# Patient Record
Sex: MALE | Race: BLACK OR AFRICAN AMERICAN
[De-identification: names, ages, dates, MRNs, and addresses within clinical notes are randomized per-mention and may not be internally consistent; named-entity substitution may affect disease eponyms.]

---

## 2018-03-22 ENCOUNTER — HOSPITAL ENCOUNTER (INPATIENT)
Dept: HOSPITAL 97 - ER | Age: 33
LOS: 4 days | Discharge: NURSING, REHAB OR LONG-TERM CARE | DRG: 539 | End: 2018-03-26
Attending: FAMILY MEDICINE | Admitting: INTERNAL MEDICINE
Payer: COMMERCIAL

## 2018-03-22 VITALS — BODY MASS INDEX: 56.5 KG/M2

## 2018-03-22 DIAGNOSIS — G82.20: ICD-10-CM

## 2018-03-22 DIAGNOSIS — M86.172: Primary | ICD-10-CM

## 2018-03-22 DIAGNOSIS — L89.893: ICD-10-CM

## 2018-03-22 DIAGNOSIS — F17.200: ICD-10-CM

## 2018-03-22 DIAGNOSIS — G89.4: ICD-10-CM

## 2018-03-22 DIAGNOSIS — Q05.2: ICD-10-CM

## 2018-03-22 DIAGNOSIS — K21.9: ICD-10-CM

## 2018-03-22 DIAGNOSIS — L89.624: ICD-10-CM

## 2018-03-22 DIAGNOSIS — R32: ICD-10-CM

## 2018-03-22 DIAGNOSIS — L89.513: ICD-10-CM

## 2018-03-22 DIAGNOSIS — I89.0: ICD-10-CM

## 2018-03-22 DIAGNOSIS — L89.523: ICD-10-CM

## 2018-03-22 DIAGNOSIS — I10: ICD-10-CM

## 2018-03-22 DIAGNOSIS — L03.115: ICD-10-CM

## 2018-03-22 LAB
ALBUMIN SERPL BCP-MCNC: 3.6 G/DL (ref 3.2–5.5)
ALP SERPL-CCNC: 192 IU/L (ref 42–121)
ALT SERPL W P-5'-P-CCNC: 135 IU/L (ref 10–60)
AST SERPL W P-5'-P-CCNC: 88 IU/L (ref 10–42)
BUN BLD-MCNC: 10 MG/DL (ref 6–20)
GLUCOSE SERPLBLD-MCNC: 84 MG/DL (ref 65–120)
HCT VFR BLD CALC: 45.4 % (ref 39.6–49)
INR BLD: 1.1
LIPASE SERPL-CCNC: 22 U/L (ref 22–51)
LYMPHOCYTES # SPEC AUTO: 1 K/UL (ref 0.7–4.9)
MCH RBC QN AUTO: 27.6 PG (ref 27–35)
MCV RBC: 82.4 FL (ref 80–100)
PMV BLD: 8.3 FL (ref 7.6–11.3)
POTASSIUM SERPL-SCNC: 3.9 MEQ/L (ref 3.6–5)
RBC # BLD: 5.51 M/UL (ref 4.33–5.43)

## 2018-03-22 PROCEDURE — 80076 HEPATIC FUNCTION PANEL: CPT

## 2018-03-22 PROCEDURE — 83690 ASSAY OF LIPASE: CPT

## 2018-03-22 PROCEDURE — 83735 ASSAY OF MAGNESIUM: CPT

## 2018-03-22 PROCEDURE — 87186 SC STD MICRODIL/AGAR DIL: CPT

## 2018-03-22 PROCEDURE — 85025 COMPLETE CBC W/AUTO DIFF WBC: CPT

## 2018-03-22 PROCEDURE — 80048 BASIC METABOLIC PNL TOTAL CA: CPT

## 2018-03-22 PROCEDURE — 87040 BLOOD CULTURE FOR BACTERIA: CPT

## 2018-03-22 PROCEDURE — 99285 EMERGENCY DEPT VISIT HI MDM: CPT

## 2018-03-22 PROCEDURE — 85610 PROTHROMBIN TIME: CPT

## 2018-03-22 PROCEDURE — 81015 MICROSCOPIC EXAM OF URINE: CPT

## 2018-03-22 PROCEDURE — 87205 SMEAR GRAM STAIN: CPT

## 2018-03-22 PROCEDURE — 83880 ASSAY OF NATRIURETIC PEPTIDE: CPT

## 2018-03-22 PROCEDURE — 84145 PROCALCITONIN (PCT): CPT

## 2018-03-22 PROCEDURE — 81003 URINALYSIS AUTO W/O SCOPE: CPT

## 2018-03-22 PROCEDURE — 87070 CULTURE OTHR SPECIMN AEROBIC: CPT

## 2018-03-22 PROCEDURE — 93005 ELECTROCARDIOGRAM TRACING: CPT

## 2018-03-22 PROCEDURE — 36415 COLL VENOUS BLD VENIPUNCTURE: CPT

## 2018-03-22 PROCEDURE — 84484 ASSAY OF TROPONIN QUANT: CPT

## 2018-03-22 PROCEDURE — 83605 ASSAY OF LACTIC ACID: CPT

## 2018-03-22 PROCEDURE — 87077 CULTURE AEROBIC IDENTIFY: CPT

## 2018-03-22 PROCEDURE — 71045 X-RAY EXAM CHEST 1 VIEW: CPT

## 2018-03-22 NOTE — XMS REPORT
Patient Summary Document

 Created on:2018



Patient:KAITLIN VERDIN

Sex:Male

:1985

External Reference #:[id number]





Demographics







 Address  [address]



   [place] [address]

 

 Home Phone  [phone]

 

 Work Phone  [phone]

 

 Email Address  [phone]

 

 Preferred Language  Unknown

 

 Marital Status  Unknown

 

 Oriental orthodox Affiliation  Unknown

 

 Race  Unknown

 

 Additional Race(s)  Unavailable

 

 Ethnic Group  Unknown









Author







 Organization  [facility]

 

 Address  [address] [place] [address] [address]



   [place] [address]

 

 Phone  [phone]









Care Team Providers







 Name  Role  Phone

 

 RALPH TORRES  Unavailable  Unavailable









Problems

This patient has no known problems.



Allergies, Adverse Reactions, Alerts

This patient has no known allergies or adverse reactions.



Medications

This patient has no known medications.



Results







 Test Description  Test Time  Test Comments  Text Results  Atomic Results  
Result Comments









 BLOOD CULTURE  2017 16:22:00    









   Test Item  Value  Reference Range  Comments









 CULTURE (BEAKER) (test code=1095)  No growth in 5 days    



BLOOD [id number] 16:22:00





 Test Item  Value  Reference Range  Comments

 

 CULTURE (BEAKER) (test code=1095)  No growth in 5 days    



(MANUAL DIFFERENTIAL)2017 22:29:00





 Test Item  Value  Reference Range  Comments

 

 TOTAL COUNTED (BEAKER) (test code=1351)      

 

 WBC MORPHOLOGY (BEAKER) (test code=487)  Normal    

 

 PLT MORPHOLOGY (BEAKER) (test code=486)  Normal    

 

 RBC MORPHOLOGY (BEAKER) (test code=762)  Normal    



CBC W/PLT COUNT &amp; AUTO [id number] 22:28:00





 Test Item  Value  Reference Range  Comments

 

 WHITE BLOOD CELL COUNT (BEAKER) (test code=775)  7.3 K/ L  4.0-10.0  

 

 RED BLOOD CELL COUNT (BEAKER) (test code=761)  5.09 M/ L  4.20-5.80  

 

 HEMOGLOBIN (BEAKER) (test code=410)  13.0 GM/DL  13.0-16.8  

 

 HEMATOCRIT (BEAKER) (test code=411)  42.3 %  40.0-50.0  

 

 MEAN CORPUSCULAR VOLUME (BEAKER) (test code=753)  83.0 fL  82.0-98.0  

 

 MEAN CORPUSCULAR HEMOGLOBIN (BEAKER) (test  25.6 pg  27.0-33.0  



 code=751)      

 

 MEAN CORPUSCULAR HEMOGLOBIN CONC (BEAKER) (test  30.8 GM/DL  32.0-36.0  



 code=752)      

 

 RED CELL DISTRIBUTION WIDTH (BEAKER) (test  18.0 %  10.3-14.2  



 code=412)      

 

 PLATELET COUNT (BEAKER) (test code=756)  331 K/CU MM  150-430  

 

 MEAN PLATELET VOLUME (BEAKER) (test code=754)  8.5 fL  6.5-10.5  

 

 NUCLEATED RED BLOOD CELLS (BEAKER) (test  0 /100 WBC  0-0  



 code=413)      

 

 NEUTROPHILS RELATIVE PERCENT (BEAKER) (test  65 %    



 code=429)      

 

 LYMPHOCYTES RELATIVE PERCENT (BEAKER) (test  23 %    



 code=430)      

 

 MONOCYTES RELATIVE PERCENT (BEAKER) (test  8 %    



 code=431)      

 

 EOSINOPHILS RELATIVE PERCENT (BEAKER) (test  3 %    



 code=432)      

 

 BASOPHILS RELATIVE PERCENT (BEAKER) (test  1 %    



 code=437)      

 

 NEUTROPHILS ABSOLUTE COUNT (BEAKER) (test  4.75 K/ L  1.80-8.00  



 code=670)      

 

 LYMPHOCYTES ABSOLUTE COUNT (BEAKER) (test  1.68 K/ L  1.48-4.50  



 code=414)      

 

 MONOCYTES ABSOLUTE COUNT (BEAKER) (test  0.55 K/ L  0.00-1.30  



 code=415)      

 

 EOSINOPHILS ABSOLUTE COUNT (BEAKER) (test  0.24 K/ L  0.00-0.50  



 code=416)      

 

 BASOPHILS ABSOLUTE COUNT (BEAKER) (test  0.05 K/ L  0.00-0.20  



 code=417)      



[id number] METABOLIC [id number] 11:11:00





 Test Item  Value  Reference Range  Comments

 

 SODIUM (BEAKER) (test  138 meq/L  136-145  



 tufa=109)      

 

 POTASSIUM (BEAKER) (test  4.0 meq/L  3.5-5.1  



 code=379)      

 

 CHLORIDE (BEAKER) (test  108 meq/L    



 code=382)      

 

 CO2 (BEAKER) (test  23 meq/L  22-29  



 code=355)      

 

 BLOOD UREA NITROGEN  11 mg/dL  7-21  



 (BEAKER) (test code=354)      

 

 CREATININE (BEAKER) (test  0.74 mg/dL  0.57-1.25  



 code=358)      

 

 GLUCOSE RANDOM (BEAKER)  124 mg/dL    



 (test code=652)      

 

 CALCIUM (BEAKER) (test  8.9 mg/dL  8.4-10.2  



 code=697)      

 

 EGFR (BEAKER) (test  150 mL/min/1.73 sq m    ESTIMATED GFR IS NOT AS



 code=1092)      ACCURATE AS CREATININE



       CLEARANCE IN PREDICTING



       GLOMERULAR FILTRATION



       RATE. ESTIMATED GFR IS



       NOT APPLICABLE FOR



       DIALYSIS PATIENTS.



URINE [id number] 09:56:00





 Test Item  Value  Reference Range  Comments

 

 CULTURE (BEAKER) (test  &lt;10,000 col/mL skin jaime    



 code=1095)      



COMPREHENSIVE METABOLIC [id number] 08:49:00





 Test Item  Value  Reference Range  Comments

 

 TOTAL PROTEIN (BEAKER)  7.3 gm/dL  6.0-8.3  



 (test code=770)      

 

 ALBUMIN (BEAKER) (test  3.3 g/dL  3.5-5.0  



 code=1145)      

 

 ALKALINE PHOSPHATASE  89 U/L    



 (BEAKER) (test code=346)      

 

 BILIRUBIN TOTAL (BEAKER)  1.4 mg/dL  0.2-1.2  



 (test code=377)      

 

 SODIUM (BEAKER) (test  137 meq/L  136-145  



 kdfd=949)      

 

 POTASSIUM (BEAKER) (test  3.7 meq/L  3.5-5.1  



 code=379)      

 

 CHLORIDE (BEAKER) (test  108 meq/L    



 code=382)      

 

 CO2 (BEAKER) (test  17 meq/L  22-29  



 code=355)      

 

 BLOOD UREA NITROGEN  12 mg/dL  7-21  



 (BEAKER) (test code=354)      

 

 CREATININE (BEAKER) (test  0.78 mg/dL  0.57-1.25  



 code=358)      

 

 GLUCOSE RANDOM (BEAKER)  119 mg/dL    



 (test code=652)      

 

 CALCIUM (BEAKER) (test  8.6 mg/dL  8.4-10.2  



 code=697)      

 

 AST (SGOT) (BEAKER) (test  13 U/L  5-34  



 code=353)      

 

 ALT (SGPT) (BEAKER) (test  28 U/L  6-55  



 code=347)      

 

 EGFR (BEAKER) (test  141 mL/min/1.73 sq    ESTIMATED GFR IS NOT AS



 code=1092)  m    ACCURATE AS CREATININE



       CLEARANCE IN PREDICTING



       GLOMERULAR FILTRATION



       RATE. ESTIMATED GFR IS



       NOT APPLICABLE FOR



       DIALYSIS PATIENTS.



CBC W/PLT COUNT &amp; AUTO [id number] 08:46:00





 Test Item  Value  Reference Range  Comments

 

 WHITE BLOOD CELL COUNT (BEAKER) (test code=775)  9.4 K/ L  4.0-10.0  

 

 RED BLOOD CELL COUNT (BEAKER) (test code=761)  4.79 M/ L  4.20-5.80  

 

 HEMOGLOBIN (BEAKER) (test code=410)  12.8 GM/DL  13.0-16.8  

 

 HEMATOCRIT (BEAKER) (test code=411)  40.0 %  40.0-50.0  

 

 MEAN CORPUSCULAR VOLUME (BEAKER) (test code=753)  83.4 fL  82.0-98.0  

 

 MEAN CORPUSCULAR HEMOGLOBIN (BEAKER) (test  26.7 pg  27.0-33.0  



 code=751)      

 

 MEAN CORPUSCULAR HEMOGLOBIN CONC (BEAKER) (test  32.0 GM/DL  32.0-36.0  



 code=752)      

 

 RED CELL DISTRIBUTION WIDTH (BEAKER) (test  18.2 %  10.3-14.2  



 code=412)      

 

 PLATELET COUNT (BEAKER) (test code=756)  313 K/CU MM  150-430  

 

 MEAN PLATELET VOLUME (BEAKER) (test code=754)  8.6 fL  6.5-10.5  

 

 NUCLEATED RED BLOOD CELLS (BEAKER) (test  0 /100 WBC  0-0  



 code=413)      

 

 NEUTROPHILS RELATIVE PERCENT (BEAKER) (test  70 %    



 code=429)      

 

 LYMPHOCYTES RELATIVE PERCENT (BEAKER) (test  16 %    



 code=430)      

 

 MONOCYTES RELATIVE PERCENT (BEAKER) (test  12 %    



 code=431)      

 

 EOSINOPHILS RELATIVE PERCENT (BEAKER) (test  1 %    



 code=432)      

 

 BASOPHILS RELATIVE PERCENT (BEAKER) (test  1 %    



 code=437)      

 

 NEUTROPHILS ABSOLUTE COUNT (BEAKER) (test  6.54 K/ L  1.80-8.00  



 code=670)      

 

 LYMPHOCYTES ABSOLUTE COUNT (BEAKER) (test  1.50 K/ L  1.48-4.50  



 code=414)      

 

 MONOCYTES ABSOLUTE COUNT (BEAKER) (test  1.13 K/ L  0.00-1.30  



 code=415)      

 

 EOSINOPHILS ABSOLUTE COUNT (BEAKER) (test  0.13 K/ L  0.00-0.50  



 code=416)      

 

 BASOPHILS ABSOLUTE COUNT (BEAKER) (test  0.06 K/ L  0.00-0.20  



 code=417)      



0.00URINALYSIS W/ [id number] 20:36:00





 Test Item  Value  Reference Range  Comments

 

 COLOR (BEAKER) (test code=470)  Yellow    

 

 CLARITY (BEAKER) (test code=469)  Hazy    

 

 SPECIFIC GRAVITY UA (BEAKER) (test code=468)  1.012  1.001-1.035  

 

 PH UA (BEAKER) (test code=467)  5.5  5.0-8.0  

 

 PROTEIN UA (BEAKER) (test code=464)  100 mg/dL  Negative  

 

 GLUCOSE UA (BEAKER) (test code=365)  Negative  Negative  

 

 KETONES UA (BEAKER) (test code=371)  Negative  Negative  

 

 BILIRUBIN UA (BEAKER) (test code=462)  Negative  Negative  

 

 BLOOD UA (BEAKER) (test code=461)  Moderate  Negative  

 

 NITRITE UA (BEAKER) (test code=465)  Negative  Negative  

 

 LEUKOCYTE ESTERASE UA (BEAKER) (test code=466)  Large  Negative  

 

 UROBILINOGEN UA (BEAKER) (test code=463)  3.0 mg/dL  0.2-1.0  

 

 RBC UA (BEAKER) (test code=519)  19 /HPF    

 

 WBC UA (BEAKER) (test code=520)  182 /HPF    

 

 SOURCE(BEAKER) (test code=0942)      



BASIC METABOLIC [id number] 17:00:00





 Test Item  Value  Reference Range  Comments

 

 SODIUM (BEAKER) (test  140 meq/L  136-145  



 iwbv=792)      

 

 POTASSIUM (BEAKER) (test  3.7 meq/L  3.5-5.1  



 code=379)      

 

 CHLORIDE (BEAKER) (test  112 meq/L    



 code=382)      

 

 CO2 (BEAKER) (test  17 meq/L  22-29  



 code=355)      

 

 BLOOD UREA NITROGEN  23 mg/dL  7-21  



 (BEAKER) (test code=354)      

 

 CREATININE (BEAKER) (test  1.00 mg/dL  0.57-1.25  



 code=358)      

 

 GLUCOSE RANDOM (BEAKER)  102 mg/dL    



 (test code=652)      

 

 CALCIUM (BEAKER) (test  8.7 mg/dL  8.4-10.2  



 code=697)      

 

 EGFR (BEAKER) (test  106 mL/min/1.73 sq m    ESTIMATED GFR IS NOT AS



 code=1092)      ACCURATE AS CREATININE



       CLEARANCE IN PREDICTING



       GLOMERULAR FILTRATION



       RATE. ESTIMATED GFR IS



       NOT APPLICABLE FOR



       DIALYSIS PATIENTS.



Specimen slightly ictericCBC W/PLT COUNT &amp; AUTO [id number] 12:18
:00





 Test Item  Value  Reference Range  Comments

 

 WHITE BLOOD CELL COUNT (BEAKER) (test code=775)  16.4 K/ L  4.0-10.0  

 

 RED BLOOD CELL COUNT (BEAKER) (test code=761)  5.22 M/ L  4.20-5.80  

 

 HEMOGLOBIN (BEAKER) (test code=410)  14.4 GM/DL  13.0-16.8  

 

 HEMATOCRIT (BEAKER) (test code=411)  42.9 %  40.0-50.0  

 

 MEAN CORPUSCULAR VOLUME (BEAKER) (test code=753)  82.2 fL  82.0-98.0  

 

 MEAN CORPUSCULAR HEMOGLOBIN (BEAKER) (test  27.5 pg  27.0-33.0  



 code=751)      

 

 MEAN CORPUSCULAR HEMOGLOBIN CONC (BEAKER) (test  33.5 GM/DL  32.0-36.0  



 code=752)      

 

 RED CELL DISTRIBUTION WIDTH (BEAKER) (test  16.2 %  10.3-14.2  



 code=412)      

 

 PLATELET COUNT (BEAKER) (test code=756)  329 K/CU MM  150-430  

 

 MEAN PLATELET VOLUME (BEAKER) (test code=754)  8.2 fL  6.5-10.5  

 

 NUCLEATED RED BLOOD CELLS (BEAKER) (test  0 /100 WBC  0-0  



 code=413)      

 

 NEUTROPHILS RELATIVE PERCENT (BEAKER) (test  83 %    



 code=429)      

 

 LYMPHOCYTES RELATIVE PERCENT (BEAKER) (test  7 %    



 code=430)      

 

 MONOCYTES RELATIVE PERCENT (BEAKER) (test  9 %    



 code=431)      

 

 EOSINOPHILS RELATIVE PERCENT (BEAKER) (test  0 %    



 code=432)      

 

 BASOPHILS RELATIVE PERCENT (BEAKER) (test  0 %    



 code=437)      

 

 NEUTROPHILS ABSOLUTE COUNT (BEAKER) (test  1.62 K/ L  1.80-8.00  



 code=670)      

 

 LYMPHOCYTES ABSOLUTE COUNT (BEAKER) (test  1.15 K/ L  1.48-4.50  



 code=414)      

 

 MONOCYTES ABSOLUTE COUNT (BEAKER) (test  1.56 K/ L  0.00-1.30  



 code=415)      

 

 EOSINOPHILS ABSOLUTE COUNT (BEAKER) (test  0.01 K/ L  0.00-0.50  



 code=416)      

 

 BASOPHILS ABSOLUTE COUNT (BEAKER) (test  0.02 K/ L  0.00-0.20  



 code=417)      



(MANUAL DIFFERENTIAL)2017 12:18:00





 Test Item  Value  Reference Range  Comments

 

 TOTAL COUNTED (BEAKER) (test code=1351)      

 

 WBC MORPHOLOGY (BEAKER) (test code=487)  Normal    

 

 PLT MORPHOLOGY (BEAKER) (test code=486)  Normal    

 

 RBC MORPHOLOGY (BEAKER) (test code=762)  Normal

## 2018-03-23 LAB
BUN BLD-MCNC: 9 MG/DL (ref 6–20)
GLUCOSE SERPLBLD-MCNC: 71 MG/DL (ref 65–120)
HCT VFR BLD CALC: 42.3 % (ref 39.6–49)
LYMPHOCYTES # SPEC AUTO: 1 K/UL (ref 0.7–4.9)
MAGNESIUM SERPL-MCNC: 1.9 MG/DL (ref 1.8–2.5)
MCH RBC QN AUTO: 27.7 PG (ref 27–35)
MCV RBC: 82.5 FL (ref 80–100)
PMV BLD: 8.6 FL (ref 7.6–11.3)
POTASSIUM SERPL-SCNC: 3.9 MEQ/L (ref 3.6–5)
RBC # BLD: 5.12 M/UL (ref 4.33–5.43)
UA COMPLETE W REFLEX CULTURE PNL UR: (no result)
UA DIPSTICK W REFLEX MICRO PNL UR: (no result)

## 2018-03-23 RX ADMIN — LINEZOLID SCH MLS: 600 INJECTION, SOLUTION INTRAVENOUS at 21:01

## 2018-03-23 RX ADMIN — FENTANYL CITRATE PRN MCG: 50 INJECTION, SOLUTION INTRAMUSCULAR; INTRAVENOUS at 20:59

## 2018-03-23 RX ADMIN — PROMETHAZINE HYDROCHLORIDE PRN MG: 25 INJECTION INTRAMUSCULAR; INTRAVENOUS at 17:02

## 2018-03-23 RX ADMIN — Medication SCH ML: at 21:00

## 2018-03-23 RX ADMIN — Medication SCH ML: at 09:00

## 2018-03-23 RX ADMIN — FENTANYL CITRATE PRN MCG: 50 INJECTION, SOLUTION INTRAMUSCULAR; INTRAVENOUS at 17:02

## 2018-03-23 RX ADMIN — PIPERACILLIN SODIUM AND TAZOBACTAM SODIUM SCH MLS: 3; .375 INJECTION, POWDER, LYOPHILIZED, FOR SOLUTION INTRAVENOUS at 10:53

## 2018-03-23 RX ADMIN — FENTANYL CITRATE PRN MCG: 50 INJECTION, SOLUTION INTRAMUSCULAR; INTRAVENOUS at 10:53

## 2018-03-23 RX ADMIN — SODIUM CHLORIDE SCH MLS: 0.9 INJECTION, SOLUTION INTRAVENOUS at 03:42

## 2018-03-23 RX ADMIN — PROMETHAZINE HYDROCHLORIDE PRN MG: 25 INJECTION INTRAMUSCULAR; INTRAVENOUS at 10:53

## 2018-03-23 RX ADMIN — LINEZOLID SCH MLS: 600 INJECTION, SOLUTION INTRAVENOUS at 15:30

## 2018-03-23 RX ADMIN — METOPROLOL TARTRATE SCH MG: 25 TABLET ORAL at 17:01

## 2018-03-23 RX ADMIN — LINEZOLID SCH MLS: 600 INJECTION, SOLUTION INTRAVENOUS at 10:53

## 2018-03-23 RX ADMIN — ENOXAPARIN SODIUM SCH MG: 40 INJECTION SUBCUTANEOUS at 10:53

## 2018-03-23 RX ADMIN — PIPERACILLIN SODIUM AND TAZOBACTAM SODIUM SCH MLS: 3; .375 INJECTION, POWDER, LYOPHILIZED, FOR SOLUTION INTRAVENOUS at 17:01

## 2018-03-23 RX ADMIN — PIPERACILLIN SODIUM AND TAZOBACTAM SODIUM SCH MLS: 3; .375 INJECTION, POWDER, LYOPHILIZED, FOR SOLUTION INTRAVENOUS at 03:44

## 2018-03-23 RX ADMIN — PROMETHAZINE HYDROCHLORIDE PRN MG: 25 INJECTION INTRAMUSCULAR; INTRAVENOUS at 05:51

## 2018-03-23 RX ADMIN — Medication SCH PKT: at 21:01

## 2018-03-23 RX ADMIN — OXYCODONE AND ACETAMINOPHEN PRN TAB: 5; 325 TABLET ORAL at 04:44

## 2018-03-23 RX ADMIN — SODIUM CHLORIDE SCH: 0.9 INJECTION, SOLUTION INTRAVENOUS at 12:15

## 2018-03-23 NOTE — ER
Nurse's Notes                                                                                     

 [facility]                                                                

Name: Redd Dale Jr                                                                            

Age: 32 yrs                                                                                       

Sex: Male                                                                                         

: 1985                                                                                   

MRN: [id number]                                                                                   

Arrival Date: 2018                                                                          

Time: 20:25                                                                                       

Account#: [id number]                                                                            

Bed 8                                                                                             

Private MD:                                                                                       

Diagnosis: Gangrene, not elsewhere classified                                                     

                                                                                                  

Presentation:                                                                                     

                                                                                             

20:27 Presenting complaint: Patient states: fever X1. pt c/o vomiting and diarrhea X2 days.   ak1 

      pt last dose Percocet at 1400 today. Transition of care: patient was not received from      

      another setting of care. Onset of symptoms was 2018. Care prior to arrival:       

      None.                                                                                       

20:27 Method Of Arrival: EMS: [facility]                                                    ak1 

20:27 Acuity: AYESHA 3                                                                           ak1 

                                                                                                  

Triage Assessment:                                                                                

20:32 General: Appears in no apparent distress. Behavior is calm, cooperative. Pain:          ak1 

      Complains of pain in back and left heel. EENT: No signs and/or symptoms were reported       

      regarding the EENT system. Neuro: No deficits noted. Cardiovascular: Chest pain.            

      Respiratory: No deficits noted. GI: Reports diarrhea, nausea, vomiting. : No signs        

      and/or symptoms were reported regarding the genitourinary system. Derm: Reports fever X     

      1 day. Musculoskeletal: No signs and/or symptoms reported regarding the musculoskeletal     

      system.                                                                                     

                                                                                                  

Historical:                                                                                       

- Allergies:                                                                                      

20:32 Amoxicillin;                                                                            ak1 

20:32 Bactrim;                                                                                ak1 

20:32 Ciprofloxacin;                                                                          ak1 

20:32 CLAVULANIC ACID;                                                                        ak1 

20:32 Doxycycline;                                                                            ak1 

20:32 Levofloxacin;                                                                           ak1 

20:32 Morphine;                                                                               ak1 

20:32 sulfamethoxazole;                                                                       ak1 

20:32 Toradol;                                                                                ak1 

20:32 TRIMETHOPRIM;                                                                           ak1 

20:32 Vancomycin;                                                                             ak1 

20:32 Zofran;                                                                                 ak1 

- Home Meds:                                                                                      

20:32 Percocet  mg Oral tab 1 tab twice a day [Active];                                 ak1 

- PMHx:                                                                                           

20:32 Asthma; decubitus ulcers on feet; Cerebral Palsy; Hydrocephalus; spina bifida;          ak1 

- PSHx:                                                                                           

20:32 Cholecystectomy; Hydrocephalus Shunt; back sx; hip sx; left heel sx;                    ak1 

                                                                                                  

- Immunization history:: Adult Immunizations unknown.                                             

- Social history:: Smoking status: unknown.                                                       

                                                                                                  

                                                                                                  

Screenin:34 Abuse screen: Denies threats or abuse. Denies injuries from another. Nutritional        ak1 

      screening: No deficits noted. Tuberculosis screening: No symptoms or risk factors           

      identified. Fall Risk Ambulatory Aid- None/Bed Rest/Nurse Assist (0 pts).                   

                                                                                                  

Assessment:                                                                                       

20:35 Reassessment: Patient appears in no apparent distress at this time. No changes from     ak1 

      previously documented assessment. pt with wounds to bilateral feet.                         

23:57 Reassessment: Patient appears in no apparent distress at this time. No changes from     ak1 

      previously documented assessment. pt continues to c/o pain to bilateral lower legs. pt      

      stated he needs his left foot dressing changed due to his home health nurse canceling       

      their appointment today. ERP notified of pt requesting IV pain medication due to            

      vomiting at home. pt has had no vomiting noted while here in the ER. pt does not have       

      his personally wheelchair at this time.                                                     

                                                                                             

00:50 Reassessment: gangernous wound to bottom of left foot, ERP visualized. wound to be      ak1 

      redressed per verbal orders from ERP. .                                                     

                                                                                                  

Vital Signs:                                                                                      

                                                                                             

20:27  / 108; Pulse 110; Resp 18; Temp 99.3(O); Pulse Ox 99% on R/A; Weight 127.01 kg   ak1 

      (R); Height 4 ft. 11 in. (149.86 cm) (R); Pain 9/10;                                        

23:59  / 91; Pulse 111; Resp 20; Temp 100.0(O); Pulse Ox 100% on R/A; Pain 9/10;        ak1 

20:27 Body Mass Index 56.55 (127.01 kg, 149.86 cm)                                            ak1 

                                                                                                  

ED Course:                                                                                        

20:25 Patient arrived in ED.                                                                  ds1 

20:26 Radha Brambila, RN is Primary Nurse.                                                     ak1 

20:29 Andrey Harper MD is Attending Physician.                                              gs  

20:30 Triage completed.                                                                       ak1 

20:32 Arm band placed on Patient placed in an exam room, on a stretcher, on pulse oximetry.   ak1 

20:34 Patient has correct armband on for positive identification. Bed in low position. Call   ak1 

      light in reach. Side rails up X2. Pulse ox on. NIBP on.                                     

20:45 X-ray completed. Portable x-ray completed in exam room. Patient tolerated procedure     kc2 

      well.                                                                                       

21:49 Inserted saline lock: 18 gauge in right antecubital area, using aseptic technique.      bb  

      Blood collected.                                                                            

                                                                                             

01:13 Dressings: Jack x 1 left foot non-adherent dressing x 2 left foot Ace bandage X2 to    ak1 

      left foot.                                                                                  

01:39 Joan Rice MD is Hospitalizing Provider.                                            

03:04 No provider procedures requiring assistance completed. Patient admitted, IV remains in  ak1 

      place.                                                                                      

                                                                                                  

Administered Medications:                                                                         

                                                                                             

22:00 Drug: NS 0.9% 1000 ml Route: IV; Rate: 1 bolus; Site: right antecubital;                ak1 

                                                                                             

00:50 Drug: Percocet (5 mg-325 mg) 2 tabs Route: PO;                                          ak1 

01:45 Follow up: Response: No adverse reaction                                                ak1 

                                                                                                  

                                                                                                  

Output:                                                                                           

                                                                                             

21:15 Urine: 250ml (Ortega); Total: 250ml.                                                     ak1 

                                                                                                  

Outcome:                                                                                          

                                                                                             

01:39 Decision to Hospitalize by Provider.                                                      

03:04 Admitted to Med/surg accompanied by nurse, accompanied by tech, via stretcher, with     ak1 

      chart, Report called to  Mari COTTO                                                         

03:04 Condition: stable                                                                           

03:04 Instructed on the need for admit.                                                           

03:07 Patient left the ED.                                                                    ak1 

                                                                                                  

Signatures:                                                                                       

Lilly Sandhu Brenda, RN                     RN   Radha Chao RN                       RN   ak1                                                  

Jacqueline Green Gregory, MD MD                                                      

                                                                                                  

**************************************************************************************************

## 2018-03-23 NOTE — P.PN
Subjective


Date of Service: 03/23/18


Primary Care Provider: Dr. Knox; Surgery-Dr. Dorado


Chief Complaint: worsening left chronic osteomyelitis/wound


Subjective: Other (Patient reporting pain to the left foot.  Overall stable.)





Physical Examination





- Vital Signs


Temperature: 100.0 F


Blood Pressure: 163/91


Pulse: 111


Respirations: 20





- Physical Exam


General: Alert, In no apparent distress, Cooperative


HEENT: Atraumatic


Neck: Supple


Respiratory: Clear to auscultation bilaterally, Normal air movement


Cardiovascular: Normal pulses, Regular rate/rhythm


Gastrointestinal: Normal bowel sounds, Soft and benign, Non-distended, No 

tenderness, No masses, No rebound, No guarding


Integumentary: Other (Ulcer to the left foot/heel.  Some exudate noted.)


Neurological: Normal speech, Other (Patient with history of spina bifida.  Not 

able to move his lower extremity.)


Urinary: Suprapubic catheter





- Studies


Laboratory Data (last 24 hrs)





03/22/18 21:49: PT 13.0 H, INR 1.10


03/22/18 21:49: WBC 8.5, Hgb 15.2, Hct 45.4, Plt Count 353


03/22/18 21:49: B-Natriuretic Peptide 20


03/22/18 21:49: Sodium 141, Potassium 3.9, BUN 10, Creatinine 0.80, Glucose 84, 

Total Bilirubin 1.9 H, AST 88 H,  H, Alkaline Phosphatase 192 H, Lipase 

22





Medications List Reviewed: Yes





Assessment & Plan





- Problems (Diagnosis)


(1) Poor social situation


Current Visit: Yes   Status: Chronic   


Plan: 


Patient with poor social situation that is affecting his care for his multiple 

ulcers.  Patient will likely need skilled placement for aggressive wound care 

and IV antibiotic therapy.  Awaiting evaluation of left foot ulcer.  Patient 

likely has osteomyelitis.  Culture obtained.  Will continue current IV 

antibiotic therapy.  Care discussed with surgery.








(2) Hypertension


Onset Date: 11/03/17   Current Visit: No   Status: Chronic   


Plan: 


Patient with history of hypertension.  He is not taking any medication.  Blood 

pressure elevated.  Will start metoprolol.  Will monitor closely.  May need 

further adjustment.


Qualifiers: 


   Hypertension type: essential hypertension   Qualified Code(s): I10 - 

Essential (primary) hypertension   





(3) Lymphedema


Onset Date: 11/03/17   Current Visit: No   Status: Chronic   


Plan: 


Patient with chronic lymphedema.  Will monitor closely.








(4) Stage III pressure ulcer of left ankle


Current Visit: No   Status: Acute   


Plan: 


Pressure ulcer noted with exudate.  Wound culture obtained.  Suspect 

osteomyelitis.  MRI ordered.  Patient will likely need aggressive wound care 

and IV antibiotic therapy.  If required patient will need PICC line and social 

services will need to help with skilled placement.  Await MRI finding.  Care 

discussed with surgery who agrees with the plan.








(5) Chronic pain disorder


Onset Date: 11/03/17   Current Visit: No   Status: Chronic   


Plan: 


Will provide medication for pain.  Will need to monitor for drug-seeking 

behavior.








(6) GERD (gastroesophageal reflux disease)


Onset Date: 11/03/17   Current Visit: No   Status: Chronic   


Plan: 


Will provide PPI.


Qualifiers: 


   Esophagitis presence: esophagitis presence not specified   Qualified Code(s)

: K21.9 - Gastro-esophageal reflux disease without esophagitis   





(7) Chronic indwelling Ortega catheter


Onset Date: 11/03/17   Current Visit: No   Status: Chronic   


Plan: 


Patient with chronic indwelling Ortega catheter.  Patient with suprapubic 

catheter.  This needs to be replaced every month.  Will need to check his last 

change.








(8) Osteomyelitis


Current Visit: No   Status: Suspected   


Plan: 


Suspect osteomyelitis of the left foot.  Will obtain MRI.  Will continue with 

above plan of care.


Qualifiers: 


   Osteomyelitis type: chronic multifocal   Osteomyelitis location: foot   

Laterality: left   Qualified Code(s): M86.372 - Chronic multifocal osteomyelitis

, left ankle and foot   





(9) Paraplegic spinal paralysis


Onset Date: 07/22/16   Current Visit: No   Status: Chronic   


Plan: 


Patient with history of spina bifida.  Patient has paralysis to the lower 

extremities.  Stable at this time.








(10) Spina bifida


Onset Date: 11/03/17   Current Visit: No   Status: Chronic   


Plan: 


Patient with history of spina bifida.  Patient with shunt in place.


Qualifiers: 


   Spinal region: lumbosacral   Presence of hydrocephalus: unspecified 

hydrocephalus presence   Qualified Code(s): Q05.7 - Lumbar spina bifida without 

hydrocephalus   


Discharge Plan: Other (Skilled placement)


Plan to discharge in: Greater than 2 days


Time Spent Managing Pts Care (In Minutes): 55

## 2018-03-23 NOTE — CON
Reason:  Wound, left heel.



History Of Present Illness:  The patient is a 32-year-old gentleman with chronic wound on his left fo
ot.  He came to the ER complaining of pain, fever, and sweating for 2 days.  He follows up with me in
 the wound healing center and he had a temperature of 100.0.  He has some yellowish discharge from th
e wound and foul odor and he was admitted and I was consulted.  He is awake, alert, still acting a li
ttle weak.  No current fever or chills.



Review of Systems:

Otherwise unremarkable.



Past Medical History:  Spina bifida, history of osteomyelitis, recurrent UTI, hydrocephalus, cranial 
perineal shunt, migraines, paralysis in the lower extremities.



Past Surgical History:  Shunt revision, cholecystectomy, indwelling suprapubic catheter, foot surgery
, hip surgery, appendectomy.



Allergies:  MULTIPLE; LEVAQUIN, MORPHINE, BACTRIM, 

KETOROLAC, ONDANSETRON, AMOXICILLIN, CIPRO, DOXYCYCLINE, VANCOMYCIN, ZOFRAN.



Medications:  Reviewed.



Social History:  He does smoke.  He was counselled and drinks occasionally.



Family History:  Noncontributory.



Physical Examination:

Vital Signs:  Stable.  His temperature the last one is 100.  His heart rate is a little elevated 111.
  He is awake and alert. 

Head and Neck:  No masses. 

Chest:  Clear. 

Heart:  S1, S2. 

Abdomen: Soft. 

Extremity:  Chronic lymphedema with left heel wound approximately 8 x 6 cm with some fibrinous necrot
ic tissue in it, but there is no purulence.  There is no surrounding erythema, warmth, or edema.



Laboratory Data:  White count is normal.  There is no left shift today.  His procalcitonin is normal.
  His lactic acid is normal.



Assessment:  A 32-year-old gentleman with febrile illness, etiology unlikely to be the wound, possibl
e gastroenteritis.



Recommendation:  Hydration.  We will await the results of the MRI on the heel, but this is a chronic 
wound.  I do not think that is the source of his illness at this time.  His main issue is social issu
es at home.  His insurance does not offer for daily home health nurse to come, so he is dependent on 
his mother to change his dressing and she can do it every day and that is why he develops foul odor a
nd has difficulty with the wounds healing.  He is not able to get appropriate care for the wound as n
eeded.  I will discuss this case with Dr. Isabel, and perhaps skill nursing would be better for this 
patient so he can get adequate wound care and help with his daily life issues, which he is having to 
struggling with at this time.  We will continue antibiotics as ordered, and will await the wound cult
ures that have been already ordered and an MRI.  We will make further recommendation after the aforem
entioned workup has been completed.





AS/MAGEN

DD:  03/23/2018 09:41:14Voice ID:  [id number]

DT:  03/23/2018 11:32:22Report ID:  [id number]

## 2018-03-23 NOTE — RAD REPORT
EXAM DESCRIPTION:  RAD - Chest Single View - 3/22/2018 8:47 pm

 

CLINICAL HISTORY:  Fever, vomiting

 

COMPARISON:  February 18

 

TECHNIQUE:  AP portable chest image was obtained 2036 hours .

 

FINDINGS:  Lung volumes are low. Detail is limited due to portable technique, shallow inspiration and
 large body habitus.

 

No peripheral mass or consolidation. Heart size is normal. Vasculature within normal limits. Signific
ant failure or volume overload are not suspected. No measurable pleural effusion and no pneumothorax.
 No gross bony abnormality seen. No acute aortic findings suspected.

 

IMPRESSION:  Exam is limited but no acute cardiopulmonary process suspected.

## 2018-03-23 NOTE — RAD REPORT
EXAM DESCRIPTION:  MRIFoot Left Wo Cont3/23/2018 6:18 pm

 

CLINICAL HISTORY:  Left foot pain and swelling. Open wound posterior left foot

 

COMPARISON:  2014

 

TECHNIQUE:  Axial, sagittal and coronal magnetic resonance imaging of the left foot was obtained.

 

FINDINGS:  An ulceration involves the soft tissues of the posterior hindfoot.

 

Abnormal signal involves the posterior calcaneus compatible with osteomyelitis.

 

Extensive edema is present within the volar soft tissues of the foot

 

IMPRESSION:  Osteomyelitis involving the posterior calcaneus

 

Extensive edema within the volar soft tissues of the forefoot. If the patient has clinical symptoms t
o suggest an abscess in this region then ultrasound would be recommended

## 2018-03-23 NOTE — EKG
Test Date:    2018-03-22               Test Time:    21:09:24

Technician:   RICHA                                    

                                                     

MEASUREMENT RESULTS:                                       

Intervals:                                           

Rate:         104                                    

OR:           154                                    

QRSD:         92                                     

QT:           314                                    

QTc:          412                                    

Axis:                                                

P:            52                                     

OR:           154                                    

QRS:          41                                     

T:            19                                     

                                                     

INTERPRETIVE STATEMENTS:                                       

                                                     

Sinus tachycardia

Otherwise normal ECG



Electronically Signed On 03-23-18 07:56:40 CDT by Yaron Donohue

## 2018-03-23 NOTE — EDPHYS
Physician Documentation                                                                           

 [facility]                                                                

Name: Redd Dale Jr                                                                            

Age: 32 yrs                                                                                       

Sex: Male                                                                                         

: 1985                                                                                   

MRN: [id number]                                                                                   

Arrival Date: 2018                                                                          

Time: 20:25                                                                                       

Account#: [id number]                                                                            

Bed 8                                                                                             

Private MD:                                                                                       

ED Physician Andrey aHrper                                                                       

HPI:                                                                                              

                                                                                             

00:07 This 32 yrs old Black Male presents to ER via EMS with complaints of Fever.             gs  

00:07 The patient reports fever, not measured (subjective). Onset: The symptoms/episode       gs  

      began/occurred today. Modifying factors: there are no obvious modifying factors.            

      Associated signs and symptoms: Pertinent negatives: abdominal pain, chest pain, cough.      

      Severity of symptoms: At their worst the symptoms were moderate in the emergency            

      department the symptoms have improved markedly, no antipyretics since 12 pm 3/22. The       

      patient has experienced similar episodes in the past, a few times.                          

                                                                                                  

Historical:                                                                                       

- Allergies:                                                                                      

                                                                                             

20:32 Amoxicillin;                                                                            ak1 

20:32 Bactrim;                                                                                ak1 

20:32 Ciprofloxacin;                                                                          ak1 

20:32 CLAVULANIC ACID;                                                                        ak1 

20:32 Doxycycline;                                                                            ak1 

20:32 Levofloxacin;                                                                           ak1 

20:32 Morphine;                                                                               ak1 

20:32 sulfamethoxazole;                                                                       ak1 

20:32 Toradol;                                                                                ak1 

20:32 TRIMETHOPRIM;                                                                           ak1 

20:32 Vancomycin;                                                                             ak1 

20:32 Zofran;                                                                                 ak1 

- Home Meds:                                                                                      

20:32 Percocet  mg Oral tab 1 tab twice a day [Active];                                 ak1 

- PMHx:                                                                                           

20:32 Asthma; decubitus ulcers on feet; Cerebral Palsy; Hydrocephalus; spina bifida;          ak1 

- PSHx:                                                                                           

20:32 Cholecystectomy; Hydrocephalus Shunt; back sx; hip sx; left heel sx;                    ak1 

                                                                                                  

- Immunization history:: Adult Immunizations unknown.                                             

- Social history:: Smoking status: unknown.                                                       

                                                                                                  

                                                                                                  

ROS:                                                                                              

                                                                                             

00:07 Cardiovascular: Negative for chest pain.                                                gs  

                                                                                                  

Exam:                                                                                             

01:35 Head/Face:  Normocephalic, atraumatic. Eyes:  Pupils equal round and reactive to light, gs  

      extra-ocular motions intact.  Lids and lashes normal.  Conjunctiva and sclera are           

      non-icteric and not injected.  Cornea within normal limits.  Periorbital areas with no      

      swelling, redness, or edema. ENT:  Nares patent. No nasal discharge, no septal              

      abnormalities noted.  Tympanic membranes are normal and external auditory canals are        

      clear.  Oropharynx with no redness, swelling, or masses, exudates, or evidence of           

      obstruction, uvula midline.  Mucous membranes moist. Neck:  Trachea midline, no             

      thyromegaly or masses palpated, and no cervical lymphadenopathy.  Supple, full range of     

      motion without nuchal rigidity, or vertebral point tenderness.  No Meningismus.             

      Chest/axilla:  Normal chest wall appearance and motion.  Nontender with no deformity.       

      No lesions are appreciated.                                                                 

01:35 Respiratory:  Lungs have equal breath sounds bilaterally, clear to auscultation and         

      percussion.  No rales, rhonchi or wheezes noted.  No increased work of breathing, no        

      retractions or nasal flaring. Abdomen/GI:  Soft, non-tender, with normal bowel sounds.      

      No distension or tympany.  No guarding or rebound.  No evidence of tenderness               

      throughout. Back:  No spinal tenderness.  No costovertebral tenderness.  Full range of      

      motion.                                                                                     

01:35 Constitutional: The patient appears alert, awake.                                           

01:35 Cardiovascular: Rate: tachycardic, Rhythm: regular.                                         

01:35 ECG was reviewed by the Attending Physician.                                                

01:35 Skin: break out stage 4 both feet plantar surface, looks gangrenous, smells putrid,         

      needs debridgement.                                                                         

                                                                                                  

Vital Signs:                                                                                      

                                                                                             

20:27  / 108; Pulse 110; Resp 18; Temp 99.3(O); Pulse Ox 99% on R/A; Weight 127.01 kg   ak1 

      (R); Height 4 ft. 11 in. (149.86 cm) (R); Pain 9/10;                                        

23:59  / 91; Pulse 111; Resp 20; Temp 100.0(O); Pulse Ox 100% on R/A; Pain 9/10;        ak1 

20:27 Body Mass Index 56.55 (127.01 kg, 149.86 cm)                                            ak1 

                                                                                                  

MDM:                                                                                              

20:30 Patient medically screened.                                                               

                                                                                             

01:35 Differential diagnosis: bacterial infection, pneumonia UTI, gastroenteritis. Data         

      reviewed: vital signs, nurses notes. Response to treatment: the patient's symptoms have     

      mildly improved after treatment, and as a result, I will admit patient.                     

                                                                                                  

                                                                                             

20:33 Order name: Basic Metabolic Panel                                                         

                                                                                             

20:33 Order name: Blood Culture Adult (2)                                                       

                                                                                             

20:33 Order name: BNP                                                                           

                                                                                             

20:33 Order name: CBC with Diff                                                                 

                                                                                             

20:33 Order name: Lactate                                                                       

                                                                                             

20:33 Order name: LFT's                                                                         

                                                                                             

20:33 Order name: Lipase                                                                        

                                                                                             

20:33 Order name: Procalcitonin                                                                 

                                                                                             

20:33 Order name: Protime (+inr)                                                                

                                                                                             

20:33 Order name: Troponin (emerg Dept Use Only)                                                

                                                                                             

20:59 Order name: Urine Dipstick--Ancillary (enter results)                                   2 

                                                                                             

21:05 Order name: Urine Dipstick-Ancillary; Complete Time: 23:17                              EDMS

                                                                                             

22:06 Order name: CBC with Automated Diff; Complete Time: 23:17                               EDMS

                                                                                             

22:16 Order name: Lactate; Complete Time: 23:17                                               EDMS

                                                                                             

20:33 Order name: Chest Single View XRAY                                                        

                                                                                             

20:33 Order name: Cardiac monitoring; Complete Time: 21:15                                      

                                                                                             

20:33 Order name: EKG - Nurse/Tech; Complete Time: 21:15                                        

                                                                                             

20:33 Order name: IV Saline Lock - Large Bore; Complete Time: 21:53                             

                                                                                             

20:33 Order name: Labs collected and sent; Complete Time: 21:53                                 

                                                                                             

20:33 Order name: O2 Per Protocol; Complete Time: 20:36                                         

                                                                                             

22:22 Order name: Troponin (Emerg Dept Use Only); Complete Time: 23:17                        EDMS

                                                                                             

22:24 Order name: Basic Metabolic Panel; Complete Time: 23:17                                 EDMS

                                                                                             

22:24 Order name: Lipase; Complete Time: 23:17                                                EDMS

                                                                                             

22:24 Order name: BNP B-Type Natriuretic Peptide; Complete Time: 23:17                        EDMS

                                                                                             

22:30 Order name: Procalcitonin; Complete Time: 23:17                                         EDMS

                                                                                             

22:30 Order name: Liver (Hepatic) Function; Complete Time: 23:17                              EDMS

                                                                                             

22:32 Order name: Protime (+INR); Complete Time: 23:17                                        EDMS

                                                                                             

20:33 Order name: O2 Sat Monitoring; Complete Time: 20:36                                       

                                                                                             

20:33 Order name: Urine Dipstick-Ancillary (obtain specimen); Complete Time: 21:01              

                                                                                                  

EC:35 Rate is 104 beats/min. Rhythm is regular. ND interval is normal. QRS interval is        gs  

      normal. QT interval is normal. T waves are Flattened. Clinical impression: NSR w/           

      Non-specific ST/T Changes and Abnormal EKG without significant change. Interpreted by       

      me.                                                                                         

                                                                                                  

Administered Medications:                                                                         

                                                                                             

22:00 Drug: NS 0.9% 1000 ml Route: IV; Rate: 1 bolus; Site: right antecubital;                ak1 

                                                                                             

00:50 Drug: Percocet (5 mg-325 mg) 2 tabs Route: PO;                                          ak1 

01:45 Follow up: Response: No adverse reaction                                                ak1 

                                                                                                  

                                                                                                  

Disposition:                                                                                      

18 01:39 Hospitalization ordered by Joan Rice for Inpatient Admission. Preliminary    

  diagnosis is Gangrene, not elsewhere classified.                                                

- Bed requested for Telemetry/MedSurg (Inpatient).                                                

- Status is Inpatient Admission.                                                              ak1 

- Condition is Stable.                                                                            

- Problem is an acute exacerbation.                                                               

- Symptoms are unchanged.                                                                         

UTI on Admission? No                                                                              

                                                                                                  

                                                                                                  

                                                                                                  

Signatures:                                                                                       

Dispatcher [employer]                           EDAdrienne Martinez RN                     Radha Miranda RN RN   ak1                                                  

Andrey Harper MD MD                                                      

                                                                                                  

Corrections: (The following items were deleted from the chart)                                    

                                                                                             

23:30 20:33 Accucheck ordered.                                                              ak1 

                                                                                                  

**************************************************************************************************

## 2018-03-24 LAB
BUN BLD-MCNC: 8 MG/DL (ref 6–20)
GLUCOSE SERPLBLD-MCNC: 87 MG/DL (ref 65–120)
POTASSIUM SERPL-SCNC: 4 MEQ/L (ref 3.6–5)

## 2018-03-24 PROCEDURE — 02HV33Z INSERTION OF INFUSION DEVICE INTO SUPERIOR VENA CAVA, PERCUTANEOUS APPROACH: ICD-10-PCS

## 2018-03-24 RX ADMIN — FENTANYL CITRATE PRN MCG: 50 INJECTION, SOLUTION INTRAMUSCULAR; INTRAVENOUS at 11:38

## 2018-03-24 RX ADMIN — PIPERACILLIN SODIUM AND TAZOBACTAM SODIUM SCH MLS: 3; .375 INJECTION, POWDER, LYOPHILIZED, FOR SOLUTION INTRAVENOUS at 00:09

## 2018-03-24 RX ADMIN — Medication SCH ML: at 20:57

## 2018-03-24 RX ADMIN — FENTANYL CITRATE PRN MCG: 50 INJECTION, SOLUTION INTRAMUSCULAR; INTRAVENOUS at 04:34

## 2018-03-24 RX ADMIN — PROMETHAZINE HYDROCHLORIDE PRN MG: 25 INJECTION INTRAMUSCULAR; INTRAVENOUS at 00:09

## 2018-03-24 RX ADMIN — FENTANYL CITRATE PRN MCG: 50 INJECTION, SOLUTION INTRAMUSCULAR; INTRAVENOUS at 18:33

## 2018-03-24 RX ADMIN — ENOXAPARIN SODIUM SCH MG: 40 INJECTION SUBCUTANEOUS at 10:06

## 2018-03-24 RX ADMIN — PROMETHAZINE HYDROCHLORIDE PRN MG: 25 INJECTION INTRAMUSCULAR; INTRAVENOUS at 11:38

## 2018-03-24 RX ADMIN — PROMETHAZINE HYDROCHLORIDE PRN MG: 25 INJECTION INTRAMUSCULAR; INTRAVENOUS at 18:33

## 2018-03-24 RX ADMIN — Medication SCH PKT: at 09:00

## 2018-03-24 RX ADMIN — LINEZOLID SCH MLS: 600 INJECTION, SOLUTION INTRAVENOUS at 20:57

## 2018-03-24 RX ADMIN — METOPROLOL TARTRATE SCH MG: 25 TABLET ORAL at 18:33

## 2018-03-24 RX ADMIN — Medication SCH ML: at 09:00

## 2018-03-24 RX ADMIN — LIDOCAINE HYDROCHLORIDE ONE: 10 INJECTION, SOLUTION INFILTRATION; PERINEURAL at 12:38

## 2018-03-24 RX ADMIN — LINEZOLID SCH MLS: 600 INJECTION, SOLUTION INTRAVENOUS at 10:07

## 2018-03-24 RX ADMIN — METOPROLOL TARTRATE SCH MG: 25 TABLET ORAL at 05:23

## 2018-03-24 RX ADMIN — PROMETHAZINE HYDROCHLORIDE PRN MG: 25 INJECTION INTRAMUSCULAR; INTRAVENOUS at 04:35

## 2018-03-24 RX ADMIN — SODIUM CHLORIDE SCH ML: 9 INJECTION, SOLUTION INTRAMUSCULAR; INTRAVENOUS; SUBCUTANEOUS at 20:58

## 2018-03-24 RX ADMIN — PIPERACILLIN SODIUM AND TAZOBACTAM SODIUM SCH MLS: 3; .375 INJECTION, POWDER, LYOPHILIZED, FOR SOLUTION INTRAVENOUS at 10:06

## 2018-03-24 RX ADMIN — COLLAGENASE SANTYL SCH APP: 250 OINTMENT TOPICAL at 09:00

## 2018-03-24 RX ADMIN — PANTOPRAZOLE SODIUM SCH MG: 40 TABLET, DELAYED RELEASE ORAL at 05:23

## 2018-03-24 RX ADMIN — Medication SCH PKT: at 20:57

## 2018-03-24 RX ADMIN — PIPERACILLIN SODIUM AND TAZOBACTAM SODIUM SCH MLS: 3; .375 INJECTION, POWDER, LYOPHILIZED, FOR SOLUTION INTRAVENOUS at 16:18

## 2018-03-24 RX ADMIN — LIDOCAINE HYDROCHLORIDE ONE ML: 10 INJECTION, SOLUTION INFILTRATION; PERINEURAL at 12:30

## 2018-03-24 NOTE — PN
Date of Progress Note:  03/24/2018



Subjective:  The patient is awake, alert, no new complaints.



Objective:  Vital Signs:  Stable, afebrile.



Laboratory Data:  White count is 7.2, there was no more left shift today.  The patient had an MR of h
is left foot and this reveals osteomyelitis of the heel. 



Physical exam, the dressings are clean, dry, and intact.  No significant change.



Assessment:  Osteomyelitis with wound left heel.



Recommendation:  PICC line, IV antibiotics.  We will await culture, adjust the antibiotics accordingl
y.  Wound care is ordered and discharge planning.





AS/MODL

DD:  03/24/2018 11:59:06Voice ID:  [id number]

DT:  03/24/2018 12:13:09Report ID:  [id number]

## 2018-03-24 NOTE — P.PN
Subjective


Date of Service: 03/24/18


Primary Care Provider: Dr. Knox; Surgery-Dr. Dorado


Chief Complaint: worsening left chronic osteomyelitis/wound


Subjective: Doing well (Pain seems to be controlled)





Physical Examination





- Vital Signs


Temperature: 98.4 F


Blood Pressure: 133/75


Pulse: 104


Respirations: 20


Pulse Ox (%): 95





- Physical Exam


General: Alert, In no apparent distress, Oriented x3, Cooperative


HEENT: Atraumatic


Neck: Supple


Respiratory: Clear to auscultation bilaterally, Normal air movement


Cardiovascular: Normal pulses, Regular rate/rhythm


Gastrointestinal: Normal bowel sounds, Soft and benign, Non-distended, No masses

, No rebound, No guarding


Musculoskeletal: Other (No changes to the left lower extremity.  Patient with 

history of spina bifida and paralysis below the waist.)





- Studies


Medications List Reviewed: Yes





Assessment & Plan





- Problems (Diagnosis)


(1) Poor social situation


Current Visit: Yes   Status: Chronic   


Plan: 


Patient with poor social situation that is affecting his care for his multiple 

ulcers.  Patient now with osteomyelitis of the left calcaneus.  Patient will 

need IV antibiotic therapy for 6 weeks.  Will consult infectious disease for 

recommendations.  Will pursue skilled placement facility to continue with 

aggressive antibiotic therapy and wound care.  PICC line ordered.








(2) Hypertension


Onset Date: 11/03/17   Current Visit: No   Status: Chronic   


Plan: 


Patient with history of hypertension.  Patient doing well with medication.  

Will monitor and adjust appropriately.


Qualifiers: 


   Hypertension type: essential hypertension   Qualified Code(s): I10 - 

Essential (primary) hypertension   





(3) Lymphedema


Onset Date: 11/03/17   Current Visit: No   Status: Chronic   


Plan: 


Patient with chronic lymphedema.  Will monitor closely.








(4) Stage III pressure ulcer of left ankle


Current Visit: No   Status: Acute   


Plan: 


MRI positive for osteomyelitis.  Will order PICC line.  Patient will need IV 

antibiotic therapy for 6 weeks.  Will consult infectious disease for 

recommendations.  Will pursue skilled placement for aggressive wound care and 

IV antibiotic therapy.








(5) Chronic pain disorder


Onset Date: 11/03/17   Current Visit: No   Status: Chronic   


Plan: 


Will provide medication for pain.  Will need to limit IV pain medication.








(6) GERD (gastroesophageal reflux disease)


Onset Date: 11/03/17   Current Visit: No   Status: Chronic   


Plan: 


Will continue with PPI.


Qualifiers: 


   Esophagitis presence: esophagitis presence not specified   Qualified Code(s)

: K21.9 - Gastro-esophageal reflux disease without esophagitis   





(7) Chronic indwelling Ortega catheter


Onset Date: 11/03/17   Current Visit: No   Status: Chronic   


Plan: 


Patient with chronic indwelling Ortega catheter.  Patient with suprapubic 

catheter.  This needs to be replaced every month.  Will need to check his last 

change.








(8) Osteomyelitis


Onset Date: 03/23/18   Current Visit: No   Status: Acute   


Plan: 


MRI positive for left calcaneus osteomyelitis.  Will continue with above plan 

of care.


Qualifiers: 


   Osteomyelitis type: chronic multifocal   Osteomyelitis location: foot   

Laterality: left   Qualified Code(s): M86.372 - Chronic multifocal osteomyelitis

, left ankle and foot   





(9) Paraplegic spinal paralysis


Onset Date: 07/22/16   Current Visit: No   Status: Chronic   


Plan: 


Patient with history of spina bifida.  Patient has paralysis to the lower 

extremities.  Stable at this time.








(10) Spina bifida


Onset Date: 11/03/17   Current Visit: No   Status: Chronic   


Plan: 


Patient with history of spina bifida.  Patient with shunt in place.


Qualifiers: 


   Spinal region: lumbosacral   Presence of hydrocephalus: unspecified 

hydrocephalus presence   Qualified Code(s): Q05.7 - Lumbar spina bifida without 

hydrocephalus   


Discharge Plan: Other (Skilled placement facility)


Plan to discharge in: 48 Hours


Time Spent Managing Pts Care (In Minutes): 55

## 2018-03-24 NOTE — RAD REPORT
EXAM DESCRIPTION:  RAD - Chest Single View - 3/24/2018 1:02 pm

 

CLINICAL HISTORY:   Device placement PICC line placement

 

COMPARISON:  March 22nd

 

FINDINGS:   A PICC line has been inserted with its tip in the distal superior vena cava.

 

The lungs appear clear of acute infiltrate. The heart is normal size.

 

IMPRESSION:   PICC line with its tip in the distal superior vena cava

## 2018-03-25 VITALS — OXYGEN SATURATION: 96 %

## 2018-03-25 RX ADMIN — FENTANYL CITRATE PRN MCG: 50 INJECTION, SOLUTION INTRAMUSCULAR; INTRAVENOUS at 05:19

## 2018-03-25 RX ADMIN — PIPERACILLIN SODIUM AND TAZOBACTAM SODIUM SCH MLS: 3; .375 INJECTION, POWDER, LYOPHILIZED, FOR SOLUTION INTRAVENOUS at 09:36

## 2018-03-25 RX ADMIN — SODIUM CHLORIDE SCH ML: 9 INJECTION, SOLUTION INTRAMUSCULAR; INTRAVENOUS; SUBCUTANEOUS at 09:00

## 2018-03-25 RX ADMIN — PROMETHAZINE HYDROCHLORIDE PRN MG: 25 INJECTION INTRAMUSCULAR; INTRAVENOUS at 12:12

## 2018-03-25 RX ADMIN — PIPERACILLIN SODIUM AND TAZOBACTAM SODIUM SCH MLS: 3; .375 INJECTION, POWDER, LYOPHILIZED, FOR SOLUTION INTRAVENOUS at 17:25

## 2018-03-25 RX ADMIN — PROMETHAZINE HYDROCHLORIDE PRN MG: 25 INJECTION INTRAMUSCULAR; INTRAVENOUS at 00:05

## 2018-03-25 RX ADMIN — Medication SCH ML: at 22:12

## 2018-03-25 RX ADMIN — FENTANYL CITRATE PRN MCG: 50 INJECTION, SOLUTION INTRAMUSCULAR; INTRAVENOUS at 00:03

## 2018-03-25 RX ADMIN — FENTANYL CITRATE PRN MCG: 50 INJECTION, SOLUTION INTRAMUSCULAR; INTRAVENOUS at 12:12

## 2018-03-25 RX ADMIN — ENOXAPARIN SODIUM SCH MG: 40 INJECTION SUBCUTANEOUS at 09:36

## 2018-03-25 RX ADMIN — Medication SCH ML: at 09:00

## 2018-03-25 RX ADMIN — PROMETHAZINE HYDROCHLORIDE PRN MG: 25 INJECTION INTRAMUSCULAR; INTRAVENOUS at 05:19

## 2018-03-25 RX ADMIN — FENTANYL CITRATE PRN MCG: 50 INJECTION, SOLUTION INTRAMUSCULAR; INTRAVENOUS at 18:50

## 2018-03-25 RX ADMIN — COLLAGENASE SANTYL SCH APP: 250 OINTMENT TOPICAL at 09:00

## 2018-03-25 RX ADMIN — PROMETHAZINE HYDROCHLORIDE PRN MG: 25 INJECTION INTRAMUSCULAR; INTRAVENOUS at 18:50

## 2018-03-25 RX ADMIN — Medication SCH PKT: at 22:16

## 2018-03-25 RX ADMIN — SODIUM CHLORIDE SCH ML: 9 INJECTION, SOLUTION INTRAMUSCULAR; INTRAVENOUS; SUBCUTANEOUS at 22:12

## 2018-03-25 RX ADMIN — Medication SCH PKT: at 09:00

## 2018-03-25 RX ADMIN — PIPERACILLIN SODIUM AND TAZOBACTAM SODIUM SCH MLS: 3; .375 INJECTION, POWDER, LYOPHILIZED, FOR SOLUTION INTRAVENOUS at 00:05

## 2018-03-25 RX ADMIN — METOPROLOL TARTRATE SCH MG: 25 TABLET ORAL at 17:26

## 2018-03-25 RX ADMIN — PANTOPRAZOLE SODIUM SCH MG: 40 TABLET, DELAYED RELEASE ORAL at 06:30

## 2018-03-25 RX ADMIN — METOPROLOL TARTRATE SCH MG: 25 TABLET ORAL at 05:18

## 2018-03-25 RX ADMIN — LINEZOLID SCH MLS: 600 INJECTION, SOLUTION INTRAVENOUS at 09:36

## 2018-03-25 NOTE — P.PN
Subjective


Date of Service: 03/25/18


Primary Care Provider: Dr. Knox; Surgery-Dr. Dorado


Chief Complaint: worsening left chronic osteomyelitis/wound


Subjective: Doing well





Physical Examination





- Vital Signs


Temperature: 97.1 F


Blood Pressure: 132/59


Pulse: 78


Respirations: 18


Pulse Ox (%): 96





- Physical Exam


General: Alert, In no apparent distress, Oriented x3, Cooperative


HEENT: Atraumatic, Mucous membr. moist/pink


Neck: Supple


Respiratory: Clear to auscultation bilaterally, Normal air movement


Cardiovascular: Normal pulses, Regular rate/rhythm


Gastrointestinal: Normal bowel sounds, Soft and benign, Non-distended, No masses

, No rebound, No guarding


Integumentary: Other (Left foot bandaged)


Neurological: Other (Paralysis below the waist with history of spina bifida)





- Studies


Medications List Reviewed: Yes





Assessment & Plan





- Problems (Diagnosis)


(1) Poor social situation


Current Visit: Yes   Status: Chronic   


Plan: 


Patient with poor social situation that is affecting his care for his multiple 

ulcers.  Patient now with osteomyelitis of the left calcaneus.  Wound culture 

positive for Providencia Rettgeri.  Adjustments to IV antibiotic therapy has 

been made.  Patient currently on Zosyn 3.375 g IV every 8 hr.  PICC line in 

place.  Patient will need skilled placement to continue with IV antibiotic 

therapy for 6 weeks along with aggressive wound care.  Await further 

recommendations from infectious disease.  Social work consulted to help with 

transfer to skilled placement facility





I will turn the service over to Dr. Granda tomorrow.  I will go over the plan of 

care with him.  








(2) Hypertension


Onset Date: 11/03/17   Current Visit: No   Status: Chronic   


Plan: 


Patient with history of hypertension.  Patient doing well with medication.  

Will monitor and adjust appropriately.


Qualifiers: 


   Hypertension type: essential hypertension   Qualified Code(s): I10 - 

Essential (primary) hypertension   





(3) Lymphedema


Onset Date: 11/03/17   Current Visit: No   Status: Chronic   


Plan: 


Patient with chronic lymphedema.  Will monitor closely.








(4) Stage III pressure ulcer of left ankle


Current Visit: No   Status: Acute   


Plan: 


MRI positive for osteomyelitis.  Continue with above plan of care.








(5) Chronic pain disorder


Onset Date: 11/03/17   Current Visit: No   Status: Chronic   


Plan: 


Will provide medication for pain.  Will need to limit IV pain medication.








(6) GERD (gastroesophageal reflux disease)


Onset Date: 11/03/17   Current Visit: No   Status: Chronic   


Plan: 


Will continue with PPI.


Qualifiers: 


   Esophagitis presence: esophagitis presence not specified   Qualified Code(s)

: K21.9 - Gastro-esophageal reflux disease without esophagitis   





(7) Chronic indwelling Ortega catheter


Onset Date: 11/03/17   Current Visit: No   Status: Chronic   


Plan: 


Patient with chronic indwelling Ortega catheter.  Patient with suprapubic 

catheter.  This needs to be replaced every month.  Will need to check his last 

change.








(8) Osteomyelitis


Onset Date: 03/23/18   Current Visit: No   Status: Acute   


Plan: 


MRI positive for left calcaneus osteomyelitis.  Will continue with above plan 

of care.


Qualifiers: 


   Osteomyelitis type: chronic multifocal   Osteomyelitis location: foot   

Laterality: left   Qualified Code(s): M86.372 - Chronic multifocal osteomyelitis

, left ankle and foot   





(9) Paraplegic spinal paralysis


Onset Date: 07/22/16   Current Visit: No   Status: Chronic   


Plan: 


Patient with history of spina bifida.  Patient has paralysis to the lower 

extremities.  Stable at this time.








(10) Spina bifida


Onset Date: 11/03/17   Current Visit: No   Status: Chronic   


Plan: 


Patient with history of spina bifida.  Patient with shunt in place.


Qualifiers: 


   Spinal region: lumbosacral   Presence of hydrocephalus: unspecified 

hydrocephalus presence   Qualified Code(s): Q05.7 - Lumbar spina bifida without 

hydrocephalus   


Discharge Plan: Other (Skilled placement facility)


Plan to discharge in: 24 Hours


Time Spent Managing Pts Care (In Minutes): 55

## 2018-03-25 NOTE — PN
Subjective:  This is a 32-year-old male with significant history of spina bifida, has been seen by me
 in the past at [facility].  The patient coming in with fever, sweating, 
and increased pain in his left foot for the last 2 days.  Has a history of pressure wounds and osteom
yelitis to the left foot.  Feels somewhat better today.



Past Medical History:  Spina bifida, history of osteomyelitis, history of recurrent urinary tract inf
ection, hydrocephalus, cranial to peritoneal shunt, migraine, chronic indwelling suprapubic catheter,
 paralysis to the lower extremity, shunt revision, cholecystectomy, foot surgery, bilateral hip surge
ry, appendectomy.



Social History:  Nondrinker.  Smokes every day.



Family History:  Noncontributory.



Medications:  Zosyn.  See MARs for other medication.



Allergies:  INCLUDE LEVAQUIN, MORPHINE, SULFAMETHOXAZOLE, VANCOMYCIN, TORADOL, ZOFRAN, AMOXICILLIN, C
IPRO, DOXYCYCLINE.



Review of Systems:

A 10-point review was performed.



Physical Examination:

General:  This is a 32-year-old male, lying in bed, not in any acute cardiopulmonary distress. 

Vital Signs:  Temperature 98.8, pulse 81, respirations 16, blood pressure 144/81. 

HEENT:  Unremarkable. 

Neck:  Supple. 

Lungs:  Basal crackles. 

Heart:  S1, S2.  Regular. 

Abdomen:  Soft, nontender.  Bowel sounds positive. 

Extremities:  Nonpitting 3+ edema.  Left heel ulcer noted.  MRI shows osteomyelitis of the calcaneous
.



Laboratory Data:  Shows WBC 7.2, hemoglobin 14.2, platelets 314.  Chemistry shows sodium 138, potassi
um 4, chloride 108, bicarb 24, BUN 8, creatinine 0.67, glucose 87.  Urine shows the patient has WBC m
ore than 50.  Chest x-ray shows no cardiopulmonary process.



Assessment And Plan:  Left heel wound with osteomyelitis of calcaneus, infected with multidrug-resist
ant Providencia.  Currently being treated with Zosyn.  We will recommend to transfer the patient to [facility].  We will follow the patient closely. 



Thank you Dr. Isabel for consult.





NF/MODL

DD:  03/25/2018 13:59:02Voice ID:  [id number]

DT:  03/25/2018 16:16:36Report ID:  [id number]

## 2018-03-26 VITALS — DIASTOLIC BLOOD PRESSURE: 66 MMHG | SYSTOLIC BLOOD PRESSURE: 145 MMHG

## 2018-03-26 VITALS — TEMPERATURE: 97.7 F

## 2018-03-26 LAB
BUN BLD-MCNC: 15 MG/DL (ref 6–20)
GLUCOSE SERPLBLD-MCNC: 126 MG/DL (ref 65–120)
HCT VFR BLD CALC: 42.6 % (ref 39.6–49)
LYMPHOCYTES # SPEC AUTO: 1.7 K/UL (ref 0.7–4.9)
MAGNESIUM SERPL-MCNC: 1.8 MG/DL (ref 1.8–2.5)
MCH RBC QN AUTO: 27.6 PG (ref 27–35)
MCV RBC: 82.8 FL (ref 80–100)
PMV BLD: 8.3 FL (ref 7.6–11.3)
POTASSIUM SERPL-SCNC: 4.4 MEQ/L (ref 3.6–5)
RBC # BLD: 5.14 M/UL (ref 4.33–5.43)

## 2018-03-26 RX ADMIN — PANTOPRAZOLE SODIUM SCH MG: 40 TABLET, DELAYED RELEASE ORAL at 06:47

## 2018-03-26 RX ADMIN — COLLAGENASE SANTYL SCH APP: 250 OINTMENT TOPICAL at 09:00

## 2018-03-26 RX ADMIN — METOPROLOL TARTRATE SCH MG: 25 TABLET ORAL at 06:46

## 2018-03-26 RX ADMIN — OXYCODONE AND ACETAMINOPHEN PRN TAB: 5; 325 TABLET ORAL at 10:23

## 2018-03-26 RX ADMIN — FENTANYL CITRATE PRN MCG: 50 INJECTION, SOLUTION INTRAMUSCULAR; INTRAVENOUS at 00:47

## 2018-03-26 RX ADMIN — FENTANYL CITRATE PRN MCG: 50 INJECTION, SOLUTION INTRAMUSCULAR; INTRAVENOUS at 06:47

## 2018-03-26 RX ADMIN — PIPERACILLIN SODIUM AND TAZOBACTAM SODIUM SCH MLS: 3; .375 INJECTION, POWDER, LYOPHILIZED, FOR SOLUTION INTRAVENOUS at 10:12

## 2018-03-26 RX ADMIN — SODIUM CHLORIDE SCH ML: 9 INJECTION, SOLUTION INTRAMUSCULAR; INTRAVENOUS; SUBCUTANEOUS at 09:00

## 2018-03-26 RX ADMIN — OXYCODONE AND ACETAMINOPHEN PRN TAB: 5; 325 TABLET ORAL at 04:27

## 2018-03-26 RX ADMIN — PROMETHAZINE HYDROCHLORIDE PRN MG: 25 INJECTION INTRAMUSCULAR; INTRAVENOUS at 06:47

## 2018-03-26 RX ADMIN — PIPERACILLIN SODIUM AND TAZOBACTAM SODIUM SCH MLS: 3; .375 INJECTION, POWDER, LYOPHILIZED, FOR SOLUTION INTRAVENOUS at 00:50

## 2018-03-26 RX ADMIN — ENOXAPARIN SODIUM SCH MG: 40 INJECTION SUBCUTANEOUS at 10:10

## 2018-03-26 RX ADMIN — PANTOPRAZOLE SODIUM SCH MG: 40 TABLET, DELAYED RELEASE ORAL at 06:46

## 2018-03-26 RX ADMIN — PROMETHAZINE HYDROCHLORIDE PRN MG: 25 INJECTION INTRAMUSCULAR; INTRAVENOUS at 00:48

## 2018-03-26 RX ADMIN — Medication SCH: at 09:00

## 2018-03-26 RX ADMIN — PIPERACILLIN SODIUM AND TAZOBACTAM SODIUM SCH MLS: 3; .375 INJECTION, POWDER, LYOPHILIZED, FOR SOLUTION INTRAVENOUS at 17:09

## 2018-03-26 RX ADMIN — METOPROLOL TARTRATE SCH MG: 25 TABLET ORAL at 17:10

## 2018-03-26 NOTE — P.DS
Admission Date: 03/23/18


Discharge Date: 03/26/18


Primary Care Provider: Dr. Knox; Surgery-Dr. Dorado


Disposition: TRANSFER TO NURSING HOME


Discharge Condition: FAIR


Reason for Admission: worsening left chronic osteomyelitis/wound





- Problems


(1) Osteomyelitis


Onset Date: 03/23/18   Current Visit: No   Status: Acute   


Qualifiers: 


   Osteomyelitis type: chronic multifocal   Osteomyelitis location: foot   

Laterality: left   Qualified Code(s): M86.372 - Chronic multifocal osteomyelitis

, left ankle and foot   





(2) Pressure ulcer of right ankle, stage 3


Onset Date: 03/23/18   Current Visit: Yes   Status: Acute   





(3) Poor social situation


Current Visit: Yes   Status: Chronic   





(4) Incontinence without sensory awareness


Current Visit: No   Status: Acute   





(5) Pressure ulcer of right leg, stage 3


Current Visit: No   Status: Acute   





(6) Stage III pressure ulcer of left ankle


Current Visit: No   Status: Acute   





(7) Chronic indwelling Ortega catheter


Onset Date: 11/03/17   Current Visit: No   Status: Chronic   





(8) Chronic pain disorder


Onset Date: 11/03/17   Current Visit: No   Status: Chronic   





(9) GERD (gastroesophageal reflux disease)


Onset Date: 11/03/17   Current Visit: No   Status: Chronic   


Qualifiers: 


   Esophagitis presence: esophagitis presence not specified   Qualified Code(s)

: K21.9 - Gastro-esophageal reflux disease without esophagitis   





(10) Heel ulcer


Onset Date: 11/03/17   Current Visit: No   Status: Chronic   


Qualifiers: 


   Laterality: left 





(11) History of Clostridium difficile colitis


Current Visit: No   Status: Chronic   





(12) Hypertension


Onset Date: 11/03/17   Current Visit: No   Status: Chronic   


Qualifiers: 


   Hypertension type: essential hypertension   Qualified Code(s): I10 - 

Essential (primary) hypertension   





(13) Lymphedema


Onset Date: 11/03/17   Current Visit: No   Status: Chronic   





(14) Spina bifida


Onset Date: 11/03/17   Current Visit: No   Status: Chronic   


Qualifiers: 


   Spinal region: lumbosacral   Presence of hydrocephalus: unspecified 

hydrocephalus presence   Qualified Code(s): Q05.7 - Lumbar spina bifida without 

hydrocephalus   





(15) Stage III pressure ulcer


Current Visit: No   Status: Chronic   





(16) Stage IV pressure ulcer of heel


Onset Date: 07/22/16   Current Visit: No   Status: Chronic   


Qualifiers: 


   Laterality: unspecified laterality   Qualified Code(s): L89.604 - Pressure 

ulcer of unspecified heel, stage 4   





(17) Cellulitis


Onset Date: 11/03/17   Current Visit: No   Status: Resolved   


Qualifiers: 


   Site of cellulitis: extremity   Site of cellulitis of extremity: lower 

extremity   Laterality: right   Qualified Code(s): L03.115 - Cellulitis of 

right lower limb   


Brief History of Present Illness: 





Mr Dale is a 32 years old male with history of chronic wound/osteomyelitis in 

his left foot, came to ED complaining of increasing pain, fever, sweating 

episodes since 2 days ago. He knows that his wound is worse because can smell 

foul odor since a couple of days. He is followed up by Dr Dorado in the wound 

healing center, last time seen about 1 week ago, according to the patient. At 

arrival he was febrile 100.0 F, WBC within normal limits, normal lactate and 

procalcitonin. Bilirubin is elevated. Left foot wound has necrotic margins with 

foul odor and yellowish secretion.


Hospital Course: 





The patient when admitted hospital because of osteomyelitis which is confirmed 

by MRI of the foot.  The patient was start on intravenous antibiotic with Zosyn 

3.375 mg IV every 8 hr without complications.  The patient had a history of 

allergy to vancomycin.  The patient is discharged to nursing home for 

continuing  intravenous antibiotics for 6 weeks and laboratory monitoring for 

liver function and kidney function every week during the intraoral venous 

antibiotic therapy


Vital Signs/Physical Exam: 














Temp Pulse Resp BP Pulse Ox


 


 97.6 F   85   16   145/66 H  92 


 


 03/26/18 15:06  03/26/18 15:06  03/26/18 15:06  03/26/18 15:06  03/26/18 15:06








General: Alert, In no apparent distress


HEENT: Atraumatic, PERRLA, EOMI


Neck: Supple, JVD not distended


Respiratory: Clear to auscultation bilaterally, Normal air movement


Cardiovascular: Regular rate/rhythm, Normal S1 S2


Gastrointestinal: Normal bowel sounds, No tenderness


Musculoskeletal: No tenderness


Integumentary: No rashes


Neurological: Normal speech, Normal tone, Normal affect


Lymphatics: No axilla or inguinal lymphadenopathy


Laboratory Data at Discharge: 














WBC  7.0 K/uL (4.3-10.9)   03/26/18  04:20    


 


Hgb  14.2 g/dL (13.6-17.9)   03/26/18  04:20    


 


Hct  42.6 % (39.6-49.0)   03/26/18  04:20    


 


Plt Count  270 K/uL (152-406)   03/26/18  04:20    


 


PT  13.0 SECONDS (9.5-12.5)  H  03/22/18  21:49    


 


INR  1.10   03/22/18  21:49    


 


Sodium  142 mEq/L (135-145)   03/26/18  04:20    


 


Potassium  4.4 mEq/L (3.6-5.0)   03/26/18  04:20    


 


BUN  15 mg/dL (6-20)   03/26/18  04:20    


 


Creatinine  0.83 mg/dL (0.61-1.24)   03/26/18  04:20    


 


Glucose  126 mg/dL ()  H  03/26/18  04:20    


 


Magnesium  1.8 mg/dL (1.8-2.5)   03/26/18  04:20    


 


Total Bilirubin  1.9 mg/dL (0.3-1.2)  H  03/22/18  21:49    


 


AST  88 IU/L (10-42)  H  03/22/18  21:49    


 


ALT  135 IU/L (10-60)  H  03/22/18  21:49    


 


Alkaline Phosphatase  192 IU/L ()  H  03/22/18  21:49    


 


B-Natriuretic Peptide  20 pg/ml (<=100)   03/22/18  21:49    


 


Lipase  22 U/L (22-51)   03/22/18  21:49    








Home Medications: 








Oxycodone HCl/Acetaminophen [Oxycodone-Acetaminophen ] 1 tab PO QID 07/21/ 16 





Diet: ADA


Activity: Ad rickie


Time spent managing pt's care (in minutes): 35

## 2018-03-26 NOTE — P.PN
Subjective


Date of Service: 03/26/18


Primary Care Provider: Dr. Knox; Surgery-Dr. Dorado


Chief Complaint: worsening left chronic osteomyelitis/wound





The patient doing better and verbalizes no new complaint today





Physical Examination





- Vital Signs


Temperature: 97.6 F


Blood Pressure: 145/66


Pulse: 85


Respirations: 16


Pulse Ox (%): 92





- Physical Exam


General: Alert, In no apparent distress


HEENT: Atraumatic, PERRLA, EOMI


Neck: Supple, JVD not distended


Respiratory: Clear to auscultation bilaterally, Normal air movement


Cardiovascular: Regular rate/rhythm, Normal S1 S2


Gastrointestinal: Normal bowel sounds, No tenderness


Musculoskeletal: No tenderness, Clubbing, Contractures, Other (Open wound and 

cellulitis to the right lower extremity)


Integumentary: No rashes


Neurological: Normal speech, Normal tone, Normal affect


Lymphatics: No axilla or inguinal lymphadenopathy





- Studies


Medications List Reviewed: Yes





Assessment And Plan





- Current Problems (Diagnosis)


(1) Osteomyelitis


Onset Date: 03/23/18   Current Visit: No   Status: Acute   


Qualifiers: 


   Osteomyelitis type: chronic multifocal   Osteomyelitis location: foot   

Laterality: left   Qualified Code(s): M86.372 - Chronic multifocal osteomyelitis

, left ankle and foot   





(2) Pressure ulcer of right ankle, stage 3


Onset Date: 03/23/18   Current Visit: Yes   Status: Acute   





(3) Poor social situation


Current Visit: Yes   Status: Chronic   





(4) Incontinence without sensory awareness


Current Visit: No   Status: Acute   





(5) Pressure ulcer of right leg, stage 3


Current Visit: No   Status: Acute   





(6) Stage III pressure ulcer of left ankle


Current Visit: No   Status: Acute   





(7) Chronic indwelling Ortega catheter


Onset Date: 11/03/17   Current Visit: No   Status: Chronic   





(8) Chronic pain disorder


Onset Date: 11/03/17   Current Visit: No   Status: Chronic   





(9) GERD (gastroesophageal reflux disease)


Onset Date: 11/03/17   Current Visit: No   Status: Chronic   


Qualifiers: 


   Esophagitis presence: esophagitis presence not specified   Qualified Code(s)

: K21.9 - Gastro-esophageal reflux disease without esophagitis   





(10) Heel ulcer


Onset Date: 11/03/17   Current Visit: No   Status: Chronic   


Qualifiers: 


   Laterality: left 





(11) History of Clostridium difficile colitis


Current Visit: No   Status: Chronic   





(12) Hypertension


Onset Date: 11/03/17   Current Visit: No   Status: Chronic   


Qualifiers: 


   Hypertension type: essential hypertension   Qualified Code(s): I10 - 

Essential (primary) hypertension   





(13) Lymphedema


Onset Date: 11/03/17   Current Visit: No   Status: Chronic   





(14) Spina bifida


Onset Date: 11/03/17   Current Visit: No   Status: Chronic   


Qualifiers: 


   Spinal region: lumbosacral   Presence of hydrocephalus: unspecified 

hydrocephalus presence   Qualified Code(s): Q05.7 - Lumbar spina bifida without 

hydrocephalus   





(15) Stage III pressure ulcer


Current Visit: No   Status: Chronic   





(16) Stage IV pressure ulcer of heel


Onset Date: 07/22/16   Current Visit: No   Status: Chronic   


Qualifiers: 


   Laterality: unspecified laterality   Qualified Code(s): L89.604 - Pressure 

ulcer of unspecified heel, stage 4   





(17) Cellulitis


Onset Date: 11/03/17   Current Visit: No   Status: Resolved   


Qualifiers: 


   Site of cellulitis: extremity   Site of cellulitis of extremity: lower 

extremity   Laterality: right   Qualified Code(s): L03.115 - Cellulitis of 

right lower limb   





- Plan





--continue intravenous antibiotics Zosyn


--cancer discharge patient to skilled nursing facility for 6 weeks of 

intravenous antibiotics


--the long-term acute care was denied by the patient's insurance


--discharge tomorrow

## 2018-03-27 ENCOUNTER — HOSPITAL ENCOUNTER (EMERGENCY)
Dept: HOSPITAL 97 - ER | Age: 33
Discharge: HOME | End: 2018-03-27
Payer: COMMERCIAL

## 2018-03-27 VITALS — DIASTOLIC BLOOD PRESSURE: 85 MMHG | OXYGEN SATURATION: 99 % | SYSTOLIC BLOOD PRESSURE: 136 MMHG

## 2018-03-27 VITALS — TEMPERATURE: 98.2 F

## 2018-03-27 DIAGNOSIS — Z88.2: ICD-10-CM

## 2018-03-27 DIAGNOSIS — Z88.5: ICD-10-CM

## 2018-03-27 DIAGNOSIS — Z88.1: ICD-10-CM

## 2018-03-27 DIAGNOSIS — Z88.6: ICD-10-CM

## 2018-03-27 DIAGNOSIS — R11.2: Primary | ICD-10-CM

## 2018-03-27 DIAGNOSIS — Z88.3: ICD-10-CM

## 2018-03-27 DIAGNOSIS — G80.9: ICD-10-CM

## 2018-03-27 DIAGNOSIS — Z88.0: ICD-10-CM

## 2018-03-27 DIAGNOSIS — Q05.4: ICD-10-CM

## 2018-03-27 LAB
ALBUMIN SERPL BCP-MCNC: 3.6 G/DL (ref 3.2–5.5)
ALP SERPL-CCNC: 113 IU/L (ref 42–121)
ALT SERPL W P-5'-P-CCNC: 53 IU/L (ref 10–60)
AST SERPL W P-5'-P-CCNC: 42 IU/L (ref 10–42)
BUN BLD-MCNC: 20 MG/DL (ref 6–20)
GLUCOSE SERPLBLD-MCNC: 87 MG/DL (ref 65–120)
HCT VFR BLD CALC: 47.1 % (ref 39.6–49)
INR BLD: 1.04
LYMPHOCYTES # SPEC AUTO: 2.1 K/UL (ref 0.7–4.9)
MAGNESIUM SERPL-MCNC: 1.9 MG/DL (ref 1.8–2.5)
MCH RBC QN AUTO: 27.3 PG (ref 27–35)
MCV RBC: 83.3 FL (ref 80–100)
PMV BLD: 8 FL (ref 7.6–11.3)
POTASSIUM SERPL-SCNC: 5.4 MEQ/L (ref 3.6–5)
RBC # BLD: 5.65 M/UL (ref 4.33–5.43)

## 2018-03-27 PROCEDURE — 96366 THER/PROPH/DIAG IV INF ADDON: CPT

## 2018-03-27 PROCEDURE — 80076 HEPATIC FUNCTION PANEL: CPT

## 2018-03-27 PROCEDURE — 84132 ASSAY OF SERUM POTASSIUM: CPT

## 2018-03-27 PROCEDURE — 85730 THROMBOPLASTIN TIME PARTIAL: CPT

## 2018-03-27 PROCEDURE — 85025 COMPLETE CBC W/AUTO DIFF WBC: CPT

## 2018-03-27 PROCEDURE — 84484 ASSAY OF TROPONIN QUANT: CPT

## 2018-03-27 PROCEDURE — 96365 THER/PROPH/DIAG IV INF INIT: CPT

## 2018-03-27 PROCEDURE — 99285 EMERGENCY DEPT VISIT HI MDM: CPT

## 2018-03-27 PROCEDURE — 96375 TX/PRO/DX INJ NEW DRUG ADDON: CPT

## 2018-03-27 PROCEDURE — 36415 COLL VENOUS BLD VENIPUNCTURE: CPT

## 2018-03-27 PROCEDURE — 85610 PROTHROMBIN TIME: CPT

## 2018-03-27 PROCEDURE — 93005 ELECTROCARDIOGRAM TRACING: CPT

## 2018-03-27 PROCEDURE — 83735 ASSAY OF MAGNESIUM: CPT

## 2018-03-27 PROCEDURE — 80048 BASIC METABOLIC PNL TOTAL CA: CPT

## 2018-03-27 NOTE — EKG
Test Date:    2018-03-27               Test Time:    14:36:24

Technician:   BETH                                    

                                                     

MEASUREMENT RESULTS:                                       

Intervals:                                           

Rate:         83                                     

ND:           144                                    

QRSD:         82                                     

QT:           362                                    

QTc:          425                                    

Axis:                                                

P:            50                                     

ND:           144                                    

QRS:          40                                     

T:            16                                     

                                                     

INTERPRETIVE STATEMENTS:                                       

                                                     

Normal sinus rhythm with sinus arrhythmia

Normal ECG

Compared to ECG 03/27/2018 10:50:32

No significant changes



Electronically Signed On 03-27-18 16:24:22 CDT by Cheng Anglin

## 2018-03-27 NOTE — EDPHYS
Physician Documentation                                                                           

 [facility]                                                                

Name: Redd Dale Jr                                                                            

Age: 32 yrs                                                                                       

Sex: Male                                                                                         

: 1985                                                                                   

MRN: [id number]                                                                                   

Arrival Date: 2018                                                                          

Time: 10:38                                                                                       

Account#: [id number]                                                                            

Bed 6                                                                                             

Private MD:                                                                                       

ED Physician Anuel Berry                                                                         

HPI:                                                                                              

                                                                                             

10:45 This 32 yrs old Black Male presents to ER via EMS with complaints of Chest Pain, Nausea.cp  

10:45 The patient or guardian reports chest pain that is located primarily in the anterior    cp  

      chest wall. Onset: The symptoms/episode began/occurred just prior to arrival. The pain      

      does not radiate.                                                                           

10:45 Associated signs and symptoms: Pertinent positives: chest pain, vomiting, nausea.       cp  

10:45 Associated signs and symptoms: Pertinent negatives: cough, diaphoresis, shortness of    cp  

      breath, diarrhea. The chest pain is described as sharp. Duration: The patient or            

      guardian reports a single episode, that is still ongoing, and unchanged.                    

                                                                                                  

Historical:                                                                                       

- Allergies:                                                                                      

10:50 Amoxicillin;                                                                            sg  

10:50 Bactrim;                                                                                sg  

10:50 Ciprofloxacin;                                                                          sg  

10:50 CLAVULANIC ACID;                                                                        sg  

10:50 Doxycycline;                                                                            sg  

10:50 Levofloxacin;                                                                           sg  

10:50 Morphine;                                                                               sg  

10:50 sulfamethoxazole;                                                                       sg  

10:50 Toradol;                                                                                sg  

10:50 TRIMETHOPRIM;                                                                           sg  

10:50 Vancomycin;                                                                             sg  

10:50 Zofran;                                                                                 sg  

- Home Meds:                                                                                      

10:50 Percocet  mg Oral tab 1 tab twice a day [Active];                                 sg  

- PMHx:                                                                                           

10:50 Asthma; Cerebral Palsy; decubitus ulcers on feet; Hydrocephalus; spina bifida;          sg  

- PSHx:                                                                                           

10:50 Cholecystectomy; Hydrocephalus Shunt; back sx; hip sx; left heel sx;                    sg  

                                                                                                  

- Immunization history:: Adult Immunizations up to date.                                          

- Social history:: Smoking status: Patient/guardian denies using tobacco.                         

                                                                                                  

                                                                                                  

ROS:                                                                                              

10:55 Eyes: Negative for injury, pain, redness, and discharge, ENT: Negative for injury,      cp  

      pain, and discharge.                                                                        

10:55 Constitutional: Negative for body aches, chills, fever, poor PO intake.                     

10:55 Cardiovascular: Positive for chest pain, Negative for palpitations.                         

10:55 Respiratory: Negative for cough, wheezing.                                                  

10:55 Abdomen/GI: Positive for nausea, Negative for diarrhea, constipation.                       

10:55 MS/extremity: Positive for pain, of the right leg and left leg.                             

10:55 Neuro: Negative for altered mental status, headache, weakness.                              

                                                                                                  

Exam:                                                                                             

10:55 ECG was reviewed by the Attending Physician.                                            cp  

11:00 Constitutional: The patient appears in no acute distress, alert, awake,                 cp  

      non-diaphoretic, non-toxic, well developed, well nourished, obese.                          

11:00 Head/Face:  Normocephalic, atraumatic. Eyes:  Pupils equal round and reactive to light, cp  

      extra-ocular motions intact.  Lids and lashes normal.  Conjunctiva and sclera are           

      non-icteric and not injected.  Cornea within normal limits.  Periorbital areas with no      

      swelling, redness, or edema. ENT:  Nares patent. No nasal discharge, no septal              

      abnormalities noted.  Tympanic membranes are normal and external auditory canals are        

      clear.  Oropharynx with no redness, swelling, or masses, exudates, or evidence of           

      obstruction, uvula midline.  Mucous membranes moist.                                        

11:00 Neck: External neck: is normal, ROM/movement: is normal, is supple, without pain, no    cp  

      range of motions limitations, no meningismus, no nuchal rigidity.                           

11:00 Chest/axilla: Inspection: normal, Palpation: is normal, no crepitus, no tenderness.         

11:00 Cardiovascular: Rate: normal, Rhythm: regular, Pulses: Pulses are 2+ in right radial        

      artery and left radial artery.                                                              

11:00 Respiratory: the patient does not display signs of respiratory distress,  Respirations: cp  

      normal, no use of accessory muscles, no retractions, no splinting, no tachypnea,            

      labored breathing, is not present, Breath sounds: are clear throughout, no decreased        

      breath sounds, no stridor, no wheezing.                                                     

11:00 Abdomen/GI: Inspection: obese Bowel sounds: active, all quadrants, Palpation: abdomen       

      is soft and non-tender, in all quadrants, rebound tenderness, is not appreciated,           

      voluntary guarding, is not appreciated, involuntary guarding, is not appreciated.           

11:00 Skin: chronic open wounds bilateral lower limbs.                                            

11:00 Neuro: Orientation: to person, place \T\ time. Mentation: is normal.                        

14:38 ECG was reviewed by the Attending Physician.                                            cp  

                                                                                                  

Vital Signs:                                                                                      

10:50  / 92; Pulse 88 MON; Resp 16 S; Temp 98.2(O); Pulse Ox 97% on R/A; Pain 10/10;    sg  

12:30  / 86; Pulse 76; Resp 16 S; Temp 98.2; Pulse Ox 98% on R/A;                       jl7 

14:00  / 85; Pulse 80; Resp 16; Pulse Ox 99% on R/A;                                    sg  

15:30  / 72; Pulse 88; Resp 17; Pulse Ox 99% on R/A;                                    sg  

17:00  / 80; Pulse 87; Resp 17; Temp 98.2; Pulse Ox 100% on R/A; Pain 7/10;             sg  

                                                                                                  

Kori Coma Score:                                                                               

17:00 Eye Response: spontaneous(4). Verbal Response: oriented(5). Motor Response: obeys       sg  

      commands(6). Total: 15.                                                                     

                                                                                                  

MDM:                                                                                              

10:40 Patient medically screened.                                                             cp  

15:10 Data reviewed: vital signs, nurses notes, lab test result(s), EKG.                      cp  

15:10 Test interpretation: by ED physician or midlevel provider: ECG. Counseling: I had a     cp  

      detailed discussion with the patient and/or guardian regarding: the historical points,      

      exam findings, and any diagnostic results supporting the discharge/admit diagnosis, lab     

      results, to return to the emergency department if symptoms worsen or persist or if          

      there are any questions or concerns that arise at home.                                     

15:10 Special discussion: Based on the patient's history, exam, and Dx evaluation, there is   cp  

      no indication for emergent intervention or inpatient Tx. It is understood by the            

      patient/guardian that if the Sx's persist or worsen they need to return immediately for     

      re-evaluation.                                                                              

15:10 Response to treatment: the patient's symptoms have markedly improved after treatment.   cp  

                                                                                                  

                                                                                             

10:43 Order name: Basic Metabolic Panel                                                       cp  

                                                                                             

10:43 Order name: CBC with Diff; Complete Time: 12:                                         cp  

                                                                                             

12:27 Interpretation: Normal except: WBC 11.0; RBC 5.65; ; RDW 17.3.                   cp  

                                                                                             

10:43 Order name: LFT's                                                                       cp  

                                                                                             

10:43 Order name: Magnesium                                                                   cp  

                                                                                             

10:43 Order name: PT-INR; Complete Time: 12:27                                                cp  

                                                                                             

10:43 Order name: Ptt, Activated; Complete Time: 12:27                                        cp  

                                                                                             

10:43 Order name: Troponin (emerg Dept Use Only); Complete Time: 12:27                        cp  

                                                                                             

10:43 Order name: Basic Metabolic Panel; Complete Time: 12:27                                 EDMS

                                                                                             

12:27 Interpretation: Normal except: K 5.4.                                                   cp  

                                                                                             

10:43 Order name: Liver (Hepatic) Function; Complete Time: 12:27                              EDMS

                                                                                             

10:43 Order name: Magnesium; Complete Time: 12:27                                             EDMS

                                                                                             

14:06 Order name: Troponin I                                                                  cp  

                                                                                             

14:06 Order name: Potassium; Complete Time: 15:08                                             cp  

                                                                                             

15:08 Interpretation: Within normal limits: K 4.6.                                            cp  

                                                                                             

14:07 Order name: Troponin I; Complete Time: 15:08                                            EDMS

                                                                                             

15:08 Interpretation: TROP < 0.03; Reviewed.                                                    

                                                                                             

10:43 Order name: EKG; Complete Time: 10:43                                                   cp  

                                                                                             

10:43 Order name: EKG - Nurse/Tech; Complete Time: 11:22                                      cp  

                                                                                             

10:43 Order name: Cardiac monitoring; Complete Time: 10:53                                    cp  

                                                                                             

10:43 Order name: IV Saline Lock; Complete Time: 10:53                                        cp  

                                                                                             

10:43 Order name: Labs collected and sent; Complete Time: 10:53                               cp  

                                                                                             

10:43 Order name: O2 Per Protocol; Complete Time: 10:53                                       cp  

                                                                                             

10:43 Order name: O2 Sat Monitoring; Complete Time: 10:54                                     cp  

                                                                                             

14:06 Order name: EKG; Complete Time: 14:07                                                   cp  

                                                                                             

14:06 Order name: EKG - Nurse/Tech; Complete Time: 15:03                                      cp  

                                                                                                  

ECG:                                                                                              

10:55 Rate is 90 beats/min. NC interval is normal. QRS interval is normal. QT interval is     cp  

      normal. T waves are Inverted in lead III. No ST changes noted. Interpreted by me.           

      Reviewed by me.                                                                             

14:38 Rate is 83 beats/min. Rhythm is regular. NC interval is normal. QRS interval is normal. cp  

      QT interval is normal. No ST changes noted. Interpreted by me. Reviewed by me.              

                                                                                                  

Administered Medications:                                                                         

11:00 Drug: fentaNYL (PF) 25 mcg Route: IVP; Site: PICC;                                      sg  

11:30 Follow up: Response: No adverse reaction; Pain is decreased                             sg  

11:08 Not Given (Physician Discretion): Zofran 4 mg IVP once; over 2 minutes                  cp  

11:10 Drug: Pepcid 20 mg Route: IVP; Site: PICC;                                              sg  

11:30 Follow up: Response: No adverse reaction                                                sg  

11:18 Drug: Phenergan 25 mg Route: IVP; Site: PICC;                                           sg  

12:30 Follow up: Response: No adverse reaction; Nausea is decreased                           sg  

13:18 Drug: NS 0.9% 1000 ml Route: IV; Rate: 1 bolus; Site: PICC;                             sg  

13:50 Drug: Zosyn 3.375 grams Route: IVPB; Infused Over: 60 mins; Site: PICC;                 sg  

15:35 Follow up: Response: No adverse reaction; IV Status: Completed infusion                 sg  

15:07 Not Given (Physician Discretion; ordered to hold med dt repeat potassium draw):         sg  

      Kayexalate 30 grams PO once                                                                 

15:35 Drug: Hydrocodone-Acetaminophen (7.5 mg-325 mg) 1 tabs Route: PO;                       sg  

                                                                                                  

                                                                                                  

Disposition:                                                                                      

17:11 Co-signature as Attending Physician, Anuel Berry MD.                                    rn  

                                                                                                  

Disposition:                                                                                      

18 15:16 Discharged to Home. Impression: Other chest pain, Nausea and vomiting.             

- Condition is Stable.                                                                            

- Discharge Instructions: Nonspecific Chest Pain, Nausea and Vomiting.                            

- Prescriptions for Pepcid 20 mg Oral Tablet - take 1 tablet by ORAL route every 12               

  hours for 10 days; 20 tablet. Phenergan 25 mg Rectal Suppository - insert 1                     

  suppository by RECTAL route every 6 hours As needed; 12 suppository. promethazine 25            

  mg Oral Tablet - take 1 tablet by ORAL route every 6 hours As needed; 20 tablet.                

- Medication Reconciliation Form, Thank You Letter, Antibiotic Education, Prescription            

  Opioid Use form.                                                                                

- Follow up: Private Physician; When: 1 - 2 days; Reason: Recheck today's complaints.             

- Problem is new.                                                                                 

- Symptoms have improved.                                                                         

                                                                                                  

                                                                                                  

                                                                                                  

Signatures:                                                                                       

Dispatcher [employer]                           Hi Rain, RN                         RN   Anuel Yousif MD MD rn Page, Corey, PA PA   cp                                                   

                                                                                                  

**************************************************************************************************

## 2018-03-27 NOTE — XMS REPORT
Patient Summary Document

 Created on:2018



Patient:KAITLIN VERDIN

Sex:Male

:1985

External Reference #:[id number]





Demographics







 Address  [address]



   [place] [address]

 

 Home Phone  [phone]

 

 Work Phone  [phone]

 

 Email Address  [phone]

 

 Preferred Language  Unknown

 

 Marital Status  Unknown

 

 Zoroastrian Affiliation  Unknown

 

 Race  Unknown

 

 Additional Race(s)  Unavailable

 

 Ethnic Group  Unknown









Author







 Organization  [facility]

 

 Address  [address] [place] [address] [address]



   [place] [address]

 

 Phone  [phone]









Care Team Providers







 Name  Role  Phone

 

 RALPH TORRES  Unavailable  Unavailable









Problems

This patient has no known problems.



Allergies, Adverse Reactions, Alerts

This patient has no known allergies or adverse reactions.



Medications

This patient has no known medications.



Results







 Test Description  Test Time  Test Comments  Text Results  Atomic Results  
Result Comments









 BLOOD CULTURE  2017 16:22:00    









   Test Item  Value  Reference Range  Comments









 CULTURE (BEAKER) (test code=1095)  No growth in 5 days    



BLOOD [id number] 16:22:00





 Test Item  Value  Reference Range  Comments

 

 CULTURE (BEAKER) (test code=1095)  No growth in 5 days    



(MANUAL DIFFERENTIAL)2017 22:29:00





 Test Item  Value  Reference Range  Comments

 

 TOTAL COUNTED (BEAKER) (test code=1351)      

 

 WBC MORPHOLOGY (BEAKER) (test code=487)  Normal    

 

 PLT MORPHOLOGY (BEAKER) (test code=486)  Normal    

 

 RBC MORPHOLOGY (BEAKER) (test code=762)  Normal    



CBC W/PLT COUNT &amp; AUTO [id number] 22:28:00





 Test Item  Value  Reference Range  Comments

 

 WHITE BLOOD CELL COUNT (BEAKER) (test code=775)  7.3 K/ L  4.0-10.0  

 

 RED BLOOD CELL COUNT (BEAKER) (test code=761)  5.09 M/ L  4.20-5.80  

 

 HEMOGLOBIN (BEAKER) (test code=410)  13.0 GM/DL  13.0-16.8  

 

 HEMATOCRIT (BEAKER) (test code=411)  42.3 %  40.0-50.0  

 

 MEAN CORPUSCULAR VOLUME (BEAKER) (test code=753)  83.0 fL  82.0-98.0  

 

 MEAN CORPUSCULAR HEMOGLOBIN (BEAKER) (test  25.6 pg  27.0-33.0  



 code=751)      

 

 MEAN CORPUSCULAR HEMOGLOBIN CONC (BEAKER) (test  30.8 GM/DL  32.0-36.0  



 code=752)      

 

 RED CELL DISTRIBUTION WIDTH (BEAKER) (test  18.0 %  10.3-14.2  



 code=412)      

 

 PLATELET COUNT (BEAKER) (test code=756)  331 K/CU MM  150-430  

 

 MEAN PLATELET VOLUME (BEAKER) (test code=754)  8.5 fL  6.5-10.5  

 

 NUCLEATED RED BLOOD CELLS (BEAKER) (test  0 /100 WBC  0-0  



 code=413)      

 

 NEUTROPHILS RELATIVE PERCENT (BEAKER) (test  65 %    



 code=429)      

 

 LYMPHOCYTES RELATIVE PERCENT (BEAKER) (test  23 %    



 code=430)      

 

 MONOCYTES RELATIVE PERCENT (BEAKER) (test  8 %    



 code=431)      

 

 EOSINOPHILS RELATIVE PERCENT (BEAKER) (test  3 %    



 code=432)      

 

 BASOPHILS RELATIVE PERCENT (BEAKER) (test  1 %    



 code=437)      

 

 NEUTROPHILS ABSOLUTE COUNT (BEAKER) (test  4.75 K/ L  1.80-8.00  



 code=670)      

 

 LYMPHOCYTES ABSOLUTE COUNT (BEAKER) (test  1.68 K/ L  1.48-4.50  



 code=414)      

 

 MONOCYTES ABSOLUTE COUNT (BEAKER) (test  0.55 K/ L  0.00-1.30  



 code=415)      

 

 EOSINOPHILS ABSOLUTE COUNT (BEAKER) (test  0.24 K/ L  0.00-0.50  



 code=416)      

 

 BASOPHILS ABSOLUTE COUNT (BEAKER) (test  0.05 K/ L  0.00-0.20  



 code=417)      



[id number] METABOLIC [id number] 11:11:00





 Test Item  Value  Reference Range  Comments

 

 SODIUM (BEAKER) (test  138 meq/L  136-145  



 disu=169)      

 

 POTASSIUM (BEAKER) (test  4.0 meq/L  3.5-5.1  



 code=379)      

 

 CHLORIDE (BEAKER) (test  108 meq/L    



 code=382)      

 

 CO2 (BEAKER) (test  23 meq/L  22-29  



 code=355)      

 

 BLOOD UREA NITROGEN  11 mg/dL  7-21  



 (BEAKER) (test code=354)      

 

 CREATININE (BEAKER) (test  0.74 mg/dL  0.57-1.25  



 code=358)      

 

 GLUCOSE RANDOM (BEAKER)  124 mg/dL    



 (test code=652)      

 

 CALCIUM (BEAKER) (test  8.9 mg/dL  8.4-10.2  



 code=697)      

 

 EGFR (BEAKER) (test  150 mL/min/1.73 sq m    ESTIMATED GFR IS NOT AS



 code=1092)      ACCURATE AS CREATININE



       CLEARANCE IN PREDICTING



       GLOMERULAR FILTRATION



       RATE. ESTIMATED GFR IS



       NOT APPLICABLE FOR



       DIALYSIS PATIENTS.



URINE [id number] 09:56:00





 Test Item  Value  Reference Range  Comments

 

 CULTURE (BEAKER) (test  &lt;10,000 col/mL skin jaime    



 code=1095)      



COMPREHENSIVE METABOLIC [id number] 08:49:00





 Test Item  Value  Reference Range  Comments

 

 TOTAL PROTEIN (BEAKER)  7.3 gm/dL  6.0-8.3  



 (test code=770)      

 

 ALBUMIN (BEAKER) (test  3.3 g/dL  3.5-5.0  



 code=1145)      

 

 ALKALINE PHOSPHATASE  89 U/L    



 (BEAKER) (test code=346)      

 

 BILIRUBIN TOTAL (BEAKER)  1.4 mg/dL  0.2-1.2  



 (test code=377)      

 

 SODIUM (BEAKER) (test  137 meq/L  136-145  



 khbs=669)      

 

 POTASSIUM (BEAKER) (test  3.7 meq/L  3.5-5.1  



 code=379)      

 

 CHLORIDE (BEAKER) (test  108 meq/L    



 code=382)      

 

 CO2 (BEAKER) (test  17 meq/L  22-29  



 code=355)      

 

 BLOOD UREA NITROGEN  12 mg/dL  7-21  



 (BEAKER) (test code=354)      

 

 CREATININE (BEAKER) (test  0.78 mg/dL  0.57-1.25  



 code=358)      

 

 GLUCOSE RANDOM (BEAKER)  119 mg/dL    



 (test code=652)      

 

 CALCIUM (BEAKER) (test  8.6 mg/dL  8.4-10.2  



 code=697)      

 

 AST (SGOT) (BEAKER) (test  13 U/L  5-34  



 code=353)      

 

 ALT (SGPT) (BEAKER) (test  28 U/L  6-55  



 code=347)      

 

 EGFR (BEAKER) (test  141 mL/min/1.73 sq    ESTIMATED GFR IS NOT AS



 code=1092)  m    ACCURATE AS CREATININE



       CLEARANCE IN PREDICTING



       GLOMERULAR FILTRATION



       RATE. ESTIMATED GFR IS



       NOT APPLICABLE FOR



       DIALYSIS PATIENTS.



CBC W/PLT COUNT &amp; AUTO [id number] 08:46:00





 Test Item  Value  Reference Range  Comments

 

 WHITE BLOOD CELL COUNT (BEAKER) (test code=775)  9.4 K/ L  4.0-10.0  

 

 RED BLOOD CELL COUNT (BEAKER) (test code=761)  4.79 M/ L  4.20-5.80  

 

 HEMOGLOBIN (BEAKER) (test code=410)  12.8 GM/DL  13.0-16.8  

 

 HEMATOCRIT (BEAKER) (test code=411)  40.0 %  40.0-50.0  

 

 MEAN CORPUSCULAR VOLUME (BEAKER) (test code=753)  83.4 fL  82.0-98.0  

 

 MEAN CORPUSCULAR HEMOGLOBIN (BEAKER) (test  26.7 pg  27.0-33.0  



 code=751)      

 

 MEAN CORPUSCULAR HEMOGLOBIN CONC (BEAKER) (test  32.0 GM/DL  32.0-36.0  



 code=752)      

 

 RED CELL DISTRIBUTION WIDTH (BEAKER) (test  18.2 %  10.3-14.2  



 code=412)      

 

 PLATELET COUNT (BEAKER) (test code=756)  313 K/CU MM  150-430  

 

 MEAN PLATELET VOLUME (BEAKER) (test code=754)  8.6 fL  6.5-10.5  

 

 NUCLEATED RED BLOOD CELLS (BEAKER) (test  0 /100 WBC  0-0  



 code=413)      

 

 NEUTROPHILS RELATIVE PERCENT (BEAKER) (test  70 %    



 code=429)      

 

 LYMPHOCYTES RELATIVE PERCENT (BEAKER) (test  16 %    



 code=430)      

 

 MONOCYTES RELATIVE PERCENT (BEAKER) (test  12 %    



 code=431)      

 

 EOSINOPHILS RELATIVE PERCENT (BEAKER) (test  1 %    



 code=432)      

 

 BASOPHILS RELATIVE PERCENT (BEAKER) (test  1 %    



 code=437)      

 

 NEUTROPHILS ABSOLUTE COUNT (BEAKER) (test  6.54 K/ L  1.80-8.00  



 code=670)      

 

 LYMPHOCYTES ABSOLUTE COUNT (BEAKER) (test  1.50 K/ L  1.48-4.50  



 code=414)      

 

 MONOCYTES ABSOLUTE COUNT (BEAKER) (test  1.13 K/ L  0.00-1.30  



 code=415)      

 

 EOSINOPHILS ABSOLUTE COUNT (BEAKER) (test  0.13 K/ L  0.00-0.50  



 code=416)      

 

 BASOPHILS ABSOLUTE COUNT (BEAKER) (test  0.06 K/ L  0.00-0.20  



 code=417)      



0.00URINALYSIS W/ [id number] 20:36:00





 Test Item  Value  Reference Range  Comments

 

 COLOR (BEAKER) (test code=470)  Yellow    

 

 CLARITY (BEAKER) (test code=469)  Hazy    

 

 SPECIFIC GRAVITY UA (BEAKER) (test code=468)  1.012  1.001-1.035  

 

 PH UA (BEAKER) (test code=467)  5.5  5.0-8.0  

 

 PROTEIN UA (BEAKER) (test code=464)  100 mg/dL  Negative  

 

 GLUCOSE UA (BEAKER) (test code=365)  Negative  Negative  

 

 KETONES UA (BEAKER) (test code=371)  Negative  Negative  

 

 BILIRUBIN UA (BEAKER) (test code=462)  Negative  Negative  

 

 BLOOD UA (BEAKER) (test code=461)  Moderate  Negative  

 

 NITRITE UA (BEAKER) (test code=465)  Negative  Negative  

 

 LEUKOCYTE ESTERASE UA (BEAKER) (test code=466)  Large  Negative  

 

 UROBILINOGEN UA (BEAKER) (test code=463)  3.0 mg/dL  0.2-1.0  

 

 RBC UA (BEAKER) (test code=519)  19 /HPF    

 

 WBC UA (BEAKER) (test code=520)  182 /HPF    

 

 SOURCE(BEAKER) (test code=3217)      



BASIC METABOLIC [id number] 17:00:00





 Test Item  Value  Reference Range  Comments

 

 SODIUM (BEAKER) (test  140 meq/L  136-145  



 yscm=825)      

 

 POTASSIUM (BEAKER) (test  3.7 meq/L  3.5-5.1  



 code=379)      

 

 CHLORIDE (BEAKER) (test  112 meq/L    



 code=382)      

 

 CO2 (BEAKER) (test  17 meq/L  22-29  



 code=355)      

 

 BLOOD UREA NITROGEN  23 mg/dL  7-21  



 (BEAKER) (test code=354)      

 

 CREATININE (BEAKER) (test  1.00 mg/dL  0.57-1.25  



 code=358)      

 

 GLUCOSE RANDOM (BEAKER)  102 mg/dL    



 (test code=652)      

 

 CALCIUM (BEAKER) (test  8.7 mg/dL  8.4-10.2  



 code=697)      

 

 EGFR (BEAKER) (test  106 mL/min/1.73 sq m    ESTIMATED GFR IS NOT AS



 code=1092)      ACCURATE AS CREATININE



       CLEARANCE IN PREDICTING



       GLOMERULAR FILTRATION



       RATE. ESTIMATED GFR IS



       NOT APPLICABLE FOR



       DIALYSIS PATIENTS.



Specimen slightly ictericCBC W/PLT COUNT &amp; AUTO [id number] 12:18
:00





 Test Item  Value  Reference Range  Comments

 

 WHITE BLOOD CELL COUNT (BEAKER) (test code=775)  16.4 K/ L  4.0-10.0  

 

 RED BLOOD CELL COUNT (BEAKER) (test code=761)  5.22 M/ L  4.20-5.80  

 

 HEMOGLOBIN (BEAKER) (test code=410)  14.4 GM/DL  13.0-16.8  

 

 HEMATOCRIT (BEAKER) (test code=411)  42.9 %  40.0-50.0  

 

 MEAN CORPUSCULAR VOLUME (BEAKER) (test code=753)  82.2 fL  82.0-98.0  

 

 MEAN CORPUSCULAR HEMOGLOBIN (BEAKER) (test  27.5 pg  27.0-33.0  



 code=751)      

 

 MEAN CORPUSCULAR HEMOGLOBIN CONC (BEAKER) (test  33.5 GM/DL  32.0-36.0  



 code=752)      

 

 RED CELL DISTRIBUTION WIDTH (BEAKER) (test  16.2 %  10.3-14.2  



 code=412)      

 

 PLATELET COUNT (BEAKER) (test code=756)  329 K/CU MM  150-430  

 

 MEAN PLATELET VOLUME (BEAKER) (test code=754)  8.2 fL  6.5-10.5  

 

 NUCLEATED RED BLOOD CELLS (BEAKER) (test  0 /100 WBC  0-0  



 code=413)      

 

 NEUTROPHILS RELATIVE PERCENT (BEAKER) (test  83 %    



 code=429)      

 

 LYMPHOCYTES RELATIVE PERCENT (BEAKER) (test  7 %    



 code=430)      

 

 MONOCYTES RELATIVE PERCENT (BEAKER) (test  9 %    



 code=431)      

 

 EOSINOPHILS RELATIVE PERCENT (BEAKER) (test  0 %    



 code=432)      

 

 BASOPHILS RELATIVE PERCENT (BEAKER) (test  0 %    



 code=437)      

 

 NEUTROPHILS ABSOLUTE COUNT (BEAKER) (test  1.62 K/ L  1.80-8.00  



 code=670)      

 

 LYMPHOCYTES ABSOLUTE COUNT (BEAKER) (test  1.15 K/ L  1.48-4.50  



 code=414)      

 

 MONOCYTES ABSOLUTE COUNT (BEAKER) (test  1.56 K/ L  0.00-1.30  



 code=415)      

 

 EOSINOPHILS ABSOLUTE COUNT (BEAKER) (test  0.01 K/ L  0.00-0.50  



 code=416)      

 

 BASOPHILS ABSOLUTE COUNT (BEAKER) (test  0.02 K/ L  0.00-0.20  



 code=417)      



(MANUAL DIFFERENTIAL)2017 12:18:00





 Test Item  Value  Reference Range  Comments

 

 TOTAL COUNTED (BEAKER) (test code=1351)      

 

 WBC MORPHOLOGY (BEAKER) (test code=487)  Normal    

 

 PLT MORPHOLOGY (BEAKER) (test code=486)  Normal    

 

 RBC MORPHOLOGY (BEAKER) (test code=762)  Normal

## 2018-03-27 NOTE — ER
Nurse's Notes                                                                                     

 [facility]                                                                

Name: Redd Dale Jr                                                                            

Age: 32 yrs                                                                                       

Sex: Male                                                                                         

: 1985                                                                                   

MRN: [id number]                                                                                   

Arrival Date: 2018                                                                          

Time: 10:38                                                                                       

Account#: [id number]                                                                            

Bed 6                                                                                             

Private MD:                                                                                       

Diagnosis: Other chest pain;Nausea and vomiting                                                   

                                                                                                  

Presentation:                                                                                     

                                                                                             

10:47 Care prior to arrival: None.                                                            sg  

10:47 Acuity: AYESHA 3                                                                           sg  

10:50 Presenting complaint: EMS states: pt c/o midsternal CP, sharp, intermittent, also       iw  

      became very nauseated while receiving Zosyn at nursing home, is currently on                

      antibiotics for osteomyelitis and gangrene in left foot. Transition of care: patient        

      was received from another setting of care (long-term care facility), [facility]. Onset of symptoms was 2018. Care prior to arrival:             

      Medication(s) given: ASA, 81 mg, x 4.                                                       

10:50 Method Of Arrival: EMS: [facility]                                                iw  

                                                                                                  

Historical:                                                                                       

- Allergies:                                                                                      

10:50 Amoxicillin;                                                                            sg  

10:50 Bactrim;                                                                                sg  

10:50 Ciprofloxacin;                                                                          sg  

10:50 CLAVULANIC ACID;                                                                        sg  

10:50 Doxycycline;                                                                            sg  

10:50 Levofloxacin;                                                                           sg  

10:50 Morphine;                                                                               sg  

10:50 sulfamethoxazole;                                                                       sg  

10:50 Toradol;                                                                                sg  

10:50 TRIMETHOPRIM;                                                                           sg  

10:50 Vancomycin;                                                                             sg  

10:50 Zofran;                                                                                 sg  

- Home Meds:                                                                                      

10:50 Percocet  mg Oral tab 1 tab twice a day [Active];                                 sg  

- PMHx:                                                                                           

10:50 Asthma; Cerebral Palsy; decubitus ulcers on feet; Hydrocephalus; spina bifida;          sg  

- PSHx:                                                                                           

10:50 Cholecystectomy; Hydrocephalus Shunt; back sx; hip sx; left heel sx;                    sg  

                                                                                                  

- Immunization history:: Adult Immunizations up to date.                                          

- Social history:: Smoking status: Patient/guardian denies using tobacco.                         

                                                                                                  

                                                                                                  

Screenin:20 Abuse screen: Denies threats or abuse. Denies injuries from another. Nutritional        sg  

      screening: No deficits noted. Tuberculosis screening: No symptoms or risk factors           

      identified. Never had TB. Fall Risk None identified.                                        

                                                                                                  

Assessment:                                                                                       

11:20 Reassessment: Patient appears in no apparent distress at this time. General: Appears in sg  

      no apparent distress. comfortable, obese, unkempt, well nourished, Behavior is calm,        

      cooperative, appropriate for age. Pain: Complains of pain in chest, right foot and left     

      foot Pain does not radiate. Neuro: Level of Consciousness is awake, alert, obeys            

      commands, Oriented to person, place, time,  are equal bilaterally Moves all            

      extremities. Speech is normal. Cardiovascular: Heart tones S1 S2 present Capillary          

      refill is brisk in bilateral fingers Patient's skin is warm and dry. Chest pain is          

      described as vague, quality is pressure. Respiratory: Airway is patent Respiratory          

      effort is even, unlabored, Respiratory pattern is regular, symmetrical, Breath sounds       

      are clear. GI: Abdomen is round obese, Bowel sounds present X 4 quads. Reports nausea.      

      : No signs and/or symptoms were reported regarding the genitourinary system. EENT: No     

      signs and/or symptoms were reported regarding the EENT system. Derm: Skin is normal,        

      Skin temperature is hot BLE Wound noted right foot and left foot. Musculoskeletal: No       

      signs and/or symptoms reported regarding the musculoskeletal system.                        

12:30 Reassessment: Patient appears in no apparent distress at this time. Patient and/or      sg  

      family updated on plan of care and expected duration. Pain level reassessed. Patient is     

      alert, oriented x 3, equal unlabored respirations, skin warm/dry/pink. awaiting lab         

      results at this time, pt stated understanding.                                              

13:20 Reassessment: Patient appears in no apparent distress at this time. Patient and/or      sg  

      family updated on plan of care and expected duration. Pain level reassessed. Patient is     

      alert, oriented x 3, equal unlabored respirations, skin warm/dry/pink. Patient states       

      symptoms have not improved.                                                                 

14:20 Reassessment: Patient appears in no apparent distress at this time. Patient and/or      sg  

      family updated on plan of care and expected duration. Pain level reassessed. Patient is     

      alert, oriented x 3, equal unlabored respirations, skin warm/dry/pink. pt requesting        

      more pain medication and more nausea medication, Jenna CLAY notified, no new orders          

      received at this time, pt updated, will continue to monitor Patient states symptoms         

      have not improved.                                                                          

15:00 Reassessment: Patient appears in no apparent distress at this time. Patient is alert,   sg  

      oriented x 3, equal unlabored respirations, skin warm/dry/pink. Jenna CLAY at bedside        

      updating pt on results, discharge instructions provided, discharge orders received, pt      

      to be dc back to [facility]. pt stated understanding Patient states           

      symptoms have not improved.                                                                 

15:30 Reassessment: Patient appears in no apparent distress at this time. Patient and/or      sg  

      family updated on plan of care and expected duration. Pain level reassessed. pt             

      requesting a diet tray be ordered, pt states " well homar already missed lunch at [facility], so i dont know what im going to eat. It'll be much later before i      

      eat again." pt instructed that with is nausea and vomiting complaints it may not be         

      wise to eat. pt states " im pretty sure i can keep it down." pt updated awaiting            

      transport back to facility, pt stated understanding, Jenna CLAY notified pt request for      

      food, awaiting orders at this time.                                                         

15:56 Reassessment: report given to nurse Uiprian at [facility], will arrange    iw  

      for transportation back to facility.                                                        

                                                                                                  

Vital Signs:                                                                                      

10:50  / 92; Pulse 88 MON; Resp 16 S; Temp 98.2(O); Pulse Ox 97% on R/A; Pain 10/10;    sg  

12:30  / 86; Pulse 76; Resp 16 S; Temp 98.2; Pulse Ox 98% on R/A;                       jl7 

14:00  / 85; Pulse 80; Resp 16; Pulse Ox 99% on R/A;                                    sg  

15:30  / 72; Pulse 88; Resp 17; Pulse Ox 99% on R/A;                                    sg  

17:00  / 80; Pulse 87; Resp 17; Temp 98.2; Pulse Ox 100% on R/A; Pain 7/10;             sg  

                                                                                                  

Kori Coma Score:                                                                               

17:00 Eye Response: spontaneous(4). Verbal Response: oriented(5). Motor Response: obeys       sg  

      commands(6). Total: 15.                                                                     

                                                                                                  

ED Course:                                                                                        

10:38 Patient arrived in ED.                                                                  iw  

10:40 Luis Ervin PA is PHCP.                                                                cp  

10:40 Anuel Berry MD is Attending Physician.                                                cp  

10:47 Hi Espinoza, RN is Primary Nurse.                                                       sg  

10:48 Triage completed.                                                                       sg  

10:51 Arm band placed on.                                                                     sg  

11:20 Patient has correct armband on for positive identification. Cardiac monitor on. Pulse   sg  

      ox on. NIBP on. Warm blanket given. Head of bed elevated.                                   

11:20 No provider procedures requiring assistance completed. Accessed PICC line. Clean \T\ dry. sg

      Dressing loose. Good blood return. Flushes easily. Patient maintains SpO2 saturation        

      greater than 95% on room air.                                                               

14:32 Repeat lab(s) drawn. by me, sent to lab.                                                iw  

14:45 EKG done, by EKG tech. reviewed by Anuel Berry MD.                                      tc  

17:00 Patient did not have IV access during this emergency room visit.                        sg  

                                                                                                  

Administered Medications:                                                                         

11:00 Drug: fentaNYL (PF) 25 mcg Route: IVP; Site: PICC;                                      sg  

11:30 Follow up: Response: No adverse reaction; Pain is decreased                             sg  

11:08 Not Given (Physician Discretion): Zofran 4 mg IVP once; over 2 minutes                  cp  

11:10 Drug: Pepcid 20 mg Route: IVP; Site: PICC;                                              sg  

11:30 Follow up: Response: No adverse reaction                                                sg  

11:18 Drug: Phenergan 25 mg Route: IVP; Site: PICC;                                           sg  

12:30 Follow up: Response: No adverse reaction; Nausea is decreased                           sg  

13:18 Drug: NS 0.9% 1000 ml Route: IV; Rate: 1 bolus; Site: PICC;                             sg  

13:50 Drug: Zosyn 3.375 grams Route: IVPB; Infused Over: 60 mins; Site: PICC;                 sg  

15:35 Follow up: Response: No adverse reaction; IV Status: Completed infusion                 sg  

15:07 Not Given (Physician Discretion; ordered to hold med dt repeat potassium draw):         sg  

      Kayexalate 30 grams PO once                                                                 

15:35 Drug: Hydrocodone-Acetaminophen (7.5 mg-325 mg) 1 tabs Route: PO;                       sg  

                                                                                                  

                                                                                                  

Output:                                                                                           

13:00 Gastric: 200ml (Emesis); Total: 200ml.                                                  sg  

                                                                                                  

Outcome:                                                                                          

15:16 Discharge ordered by MD.                                                                cp  

17:00 Discharged to nursing home. Transfer form completed.                                    sg  

17:00 Condition: good                                                                             

17:00 Instructed on discharge instructions, medication usage, safety practices, wound care,       

      Demonstrated understanding of instructions, follow-up care, medications, wound care,        

      Prescriptions given X 3.                                                                    

17:05 Patient left the ED.                                                                    sg  

                                                                                                  

Signatures:                                                                                       

Hi Espinoza RN RN sg Williams, Irene, RN RN iw Callis, Tiffany, EKG Tech               EKG Ttc                                                   

Luis Ervin PA PA cp Leal, Jahala, RN                        RN   jl7                                                  

                                                                                                  

Corrections: (The following items were deleted from the chart)                                    

15:04 13:40 Kayexalate 30 grams PO sg                                                         sg  

                                                                                                  

**************************************************************************************************

## 2018-03-27 NOTE — EKG
Test Date:    2018-03-27               Test Time:    10:50:32

Technician:   FIFI                                     

                                                     

MEASUREMENT RESULTS:                                       

Intervals:                                           

Rate:         90                                     

OH:           140                                    

QRSD:         90                                     

QT:           350                                    

QTc:          428                                    

Axis:                                                

P:            50                                     

OH:           140                                    

QRS:          40                                     

T:            21                                     

                                                     

INTERPRETIVE STATEMENTS:                                       

                                                     

Normal sinus rhythm

Normal ECG

Compared to ECG 03/22/2018 21:09:24

Sinus tachycardia no longer present



Electronically Signed On 03-27-18 16:24:40 CDT by Cheng Anglin

## 2018-04-12 ENCOUNTER — HOSPITAL ENCOUNTER (EMERGENCY)
Dept: HOSPITAL 97 - ER | Age: 33
Discharge: HOME | End: 2018-04-12
Payer: COMMERCIAL

## 2018-04-12 VITALS — OXYGEN SATURATION: 98 % | DIASTOLIC BLOOD PRESSURE: 70 MMHG | SYSTOLIC BLOOD PRESSURE: 125 MMHG

## 2018-04-12 DIAGNOSIS — I10: ICD-10-CM

## 2018-04-12 DIAGNOSIS — Z88.0: ICD-10-CM

## 2018-04-12 DIAGNOSIS — Z88.1: ICD-10-CM
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
## 2018-04-12 DIAGNOSIS — S00.90XA: Primary | ICD-10-CM

## 2018-04-12 DIAGNOSIS — Z88.5: ICD-10-CM
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
## 2018-04-12 DIAGNOSIS — S16.1XXA: ICD-10-CM

## 2018-04-12 DIAGNOSIS — Z88.6: ICD-10-CM

## 2018-04-12 DIAGNOSIS — Z88.2: ICD-10-CM

## 2018-04-12 DIAGNOSIS — G80.9: ICD-10-CM

## 2018-04-12 DIAGNOSIS — Z88.8: ICD-10-CM

## 2018-04-12 DIAGNOSIS — Z88.3: ICD-10-CM

## 2018-04-12 DIAGNOSIS — K21.9: ICD-10-CM

## 2018-04-12 DIAGNOSIS — F17.210: ICD-10-CM

## 2018-04-12 PROCEDURE — 72125 CT NECK SPINE W/O DYE: CPT

## 2018-04-12 PROCEDURE — 99284 EMERGENCY DEPT VISIT MOD MDM: CPT

## 2018-04-12 PROCEDURE — 70450 CT HEAD/BRAIN W/O DYE: CPT

## 2018-04-12 PROCEDURE — 96374 THER/PROPH/DIAG INJ IV PUSH: CPT

## 2018-04-12 NOTE — ER
Nurse's Notes                                                                                     

 [facility]                                                                

Name: Redd Dale Jr                                                                            

Age: 32 yrs                                                                                       

Sex: Male                                                                                         

: 1985                                                                                   

MRN: [id number]                                                                                   

Arrival Date: 2018                                                                          

Time: 01:55                                                                                       

Account#: [id number]                                                                            

Bed 4                                                                                             

Private MD:                                                                                       

Diagnosis: Superficial injury of head;Strain of muscle, fascia and tendon at neck level;Fall due  

  to bumping against object                                                                       

                                                                                                  

Presentation:                                                                                     

                                                                                             

02:03 Presenting complaint: EMS states: Patient attempting to get out of bed and rolled out   lp1 

      hitting head on corner of night stand; Complaint of head and neck pain; Patient             

      non-ambulatory. Transition of care: patient was received from another setting of care       

      (long-term care facility), [facility]. Onset of symptoms was 2018 at 01:30. Care prior to arrival: Cervical collar in place.                         

02:03 Method Of Arrival: EMS: Telephone EMS                                                lp1 

02:03 Acuity: AYESHA 3                                                                           lp1 

                                                                                                  

Historical:                                                                                       

- Allergies:                                                                                      

02:10 Levofloxacin;                                                                           lp1 

02:10 Morphine;                                                                               lp1 

02:10 sulfamethoxazole;                                                                       lp1 

02:10 Vancomycin;                                                                             lp1 

02:10 Toradol;                                                                                lp1 

02:10 Zofran;                                                                                 lp1 

02:10 Amoxicillin;                                                                            lp1 

02:10 Ciprofloxacin;                                                                          lp1 

02:10 Doxycycline;                                                                            lp1 

02:10 Bactrim;                                                                                lp1 

02:10 CLAVULANIC ACID;                                                                        lp1 

02:10 TRIMETHOPRIM;                                                                           lp1 

- Home Meds:                                                                                      

02:10 pantoprazole 40 mg oral TbEC 1 tab once daily [Active]; zinc sulfate 220 (50) mg Oral   lp1 

      cap daily [Active]; metoprolol tartrate 25 mg Oral tab 1 tab 2 times per day [Active];      

      ProMod Protein Oral liqd 30 mL twice a day [Active]; Vitamin C 500 mg Oral tab twice a      

      day [Active];                                                                               

- PMHx:                                                                                           

02:10 Cluster headaches; Cerebral Palsy; Hypertension; GERD; decubitus ulcers on feet;        lp1 

      Asthma; Hydrocephalus; spina bifida;                                                        

- PSHx:                                                                                           

02:10  shunt; Cholecystectomy;                                                              lp1 

                                                                                                  

- Immunization history:: Adult Immunizations up to date.                                          

- Family history:: not pertinent.                                                                 

- Social history:: Smoking status: Patient uses tobacco products, smokes one-half pack            

  cigarettes per day.                                                                             

                                                                                                  

                                                                                                  

Screenin:05 Abuse screen: Denies threats or abuse. Denies injuries from another. Nutritional        lp1 

      screening: No deficits noted. Tuberculosis screening: No symptoms or risk factors           

      identified. Fall Risk Total Kim Fall Scale indicates High Risk Score (45 or more          

      points). Fall prevention measures have been instituted. Side Rails Up X 2 As available      

      patient and family educated on Fall Prevention Program and Strategies.                      

                                                                                                  

Assessment:                                                                                       

02:05 General: Appears in no apparent distress. uncomfortable, Behavior is anxious. Pain:     bs1 

      Complains of pain in forehead and top of head, back Pain does not radiate. Neuro: Level     

      of Consciousness is awake, alert, obeys commands, Oriented to person, place, time,          

      situation, Appropriate for age  are equal bilaterally Moves all extremities. Gait      

      is steady. Cardiovascular: Denies chest pain, palpitations, shortness of breath, Heart      

      tones S1 S2 present Capillary refill < 3 seconds Patient's skin is warm and dry.            

      Respiratory: Airway is patent Trachea midline Respiratory effort is even, unlabored,        

      Respiratory pattern is regular, symmetrical, Breath sounds are clear bilaterally. GI:       

      Abdomen is round Bowel sounds present X 4 quads. : suprapubic catheter in place from      

      prior facility Urine is nelli. EENT: No deficits noted. No signs and/or symptoms were       

      reported regarding the EENT system. Derm: bilateral feet wrapped in                         

      gauze/kerlix/booties, from prior facility, patient currently being treated for gangrene     

      in left foot, PICC line in place to left upper arm. Musculoskeletal: Circulation,           

      motion, and sensation intact. Capillary refill < 3 seconds, Range of motion: limited in     

      all extremities.                                                                            

03:05 Reassessment: Patient appears in no apparent distress at this time. No changes from     bs1 

      previously documented assessment. Patient and/or family updated on plan of care and         

      expected duration. Pain level reassessed. Patient is alert, oriented x 3, equal             

      unlabored respirations, skin warm/dry/pink.                                                 

04:05 Reassessment: Patient appears in no apparent distress at this time. No changes from     bs1 

      previously documented assessment. Patient and/or family updated on plan of care and         

      expected duration. Pain level reassessed. Patient reports pain in neck/head, informed       

      Dr Cruz,  does not want to give any pain medication until CT reports are back.        

05:05 Reassessment: No changes from previously documented assessment. Patient and/or family   bs1 

      updated on plan of care and expected duration. Pain level reassessed. Patient is alert,     

      oriented x 3, equal unlabored respirations, skin warm/dry/pink. Pending results of CT.      

06:05 Reassessment: Patient appears in no apparent distress at this time. Patient is alert,   bs1 

      oriented x 3, equal unlabored respirations, skin warm/dry/pink. No chest pain or any        

      signs of distress.                                                                          

06:32 Reassessment: Still pending results of CT.                                              bs1 

06:47 Reassessment: Report called to [facility] at 0645, report given to YADIEL Ugalde, nurse states bs1 

      that transportation will not be available until after 0800 am. Patient still c/o pain,      

      Dr Cruz notified. At this time,  does not want to give any medications. Pending       

      transfer back to [facility].                                                                      

07:03 Reassessment: Report given to YADIEL Hall.                                             bs1 

07:20 Reassessment: Patient appears in no apparent distress at this time. Patient and/or      sg  

      family updated on plan of care and expected duration. Pain level reassessed. Patient is     

      alert, oriented x 3, equal unlabored respirations, skin warm/dry/pink.                      

08:00 Reassessment: Patient appears in no apparent distress at this time. Patient and/or      sg  

      family updated on plan of care and expected duration. Pain level reassessed. Patient is     

      alert, oriented x 3, equal unlabored respirations, skin warm/dry/pink. v/o received         

      from  to remove c-collar, c-collar removed, pt c/o pain that has returned,       

      orders received for fentanyl 50 mcg ivp, pt medicated see EMAR.                             

                                                                                                  

Vital Signs:                                                                                      

02:05  / 90; Pulse 97; Resp 18; Pulse Ox 97% on R/A; Weight 131.54 kg; Height 4 ft. 11  lp1 

      in. (149.86 cm); Pain 9/10;                                                                 

03:05  / 88; Pulse 92; Resp 17; Pulse Ox 98% ; Pain 10/10;                              bs1 

04:05  / 69; Pulse 73; Resp 16; Pulse Ox 93% on R/A; Pain 10/10;                        bs1 

05:05  / 78; Pulse 72; Resp 16; Pulse Ox 93% ; Pain 10/10;                              bs1 

06:09  / 70; Pulse 87; Resp 16; Pulse Ox 98% on R/A; Pain 10/10;                        bs1 

02:05 Body Mass Index 58.57 (131.54 kg, 149.86 cm)                                            lp1 

                                                                                                  

ED Course:                                                                                        

01:55 Patient arrived in ED.                                                                  em1 

01:57 Luis Cruz MD is Attending Physician.                                             arlin 

02:05 Triage completed.                                                                       lp1 

02:05 Arm band placed on right wrist.                                                         lp1 

02:13 Niki Landaverde, RN is Primary Nurse.                                                 bs1 

02:19 Patient has correct armband on for positive identification. Bed in low position. Call   bs1 

      light in reach. Side rails up X 1. Pulse ox on. NIBP on.                                    

02:20 Accessed PICC line. left upper arm, from [facility].                      bs1 

02:58 No provider procedures requiring assistance completed.                                  bs1 

03:45 CT Head C Spine In Process Unspecified.                                                 EDMS

                                                                                                  

Administered Medications:                                                                         

02:30 Drug: fentaNYL (PF) 50 mcg {Note: left upper arm.} Route: IVP; Site: PICC;              bs1 

02:58 Follow up: Response: No adverse reaction                                                bs1 

02:40 Not Given (Patient Refused; allergic to zofran): Zofran 4 mg IVP once; over 2 minutes   bs1 

06:41 Not Given (Duplicate Order): fentaNYL (PF) 50 mcg IVP once                              arlin 

06:41 Not Given (Duplicate Order): Phenergan 12.5 mg IVP once                                 arlin 

08:00 Drug: fentaNYL (PF) 50 mcg Route: IVP; Site: PICC;                                      sg  

                                                                                                  

                                                                                                  

Outcome:                                                                                          

06:36 Discharge ordered by MD.                                                                arlin 

09:02 Patient left the ED.                                                                    sg  

                                                                                                  

Signatures:                                                                                       

Dispatcher [employer]                           EDMS                                                 

Hi Espinoza, RN                         RN   sg                                                   

Luis Cruz MD MD cha Martinez, Eric                               em1                                                  

Michaelle Vail, RN                         RN   lp1                                                  

Niki Landaverde, YADIEL                   RN   bs1                                                  

                                                                                                  

Corrections: (The following items were deleted from the chart)                                    

04:43 02:03 Transition of care: patient was not received from another setting of care. lp1    lp1 

05:53 05:05 Reassessment: No changes from previously documented assessment. Patient and/or    bs1 

      family updated on plan of care and expected duration. Pain level reassessed. Patient is     

      alert, oriented x 3, equal unlabored respirations, skin warm/dry/pink. Pending results      

      of CT bs1                                                                                   

07:04 06:47 Reassessment: Report called to [facility] at 0645, report given to YADIEL Ugalde, nurse  bs1 

      states that transportation will not be available until after 0800 am. bs1                   

                                                                                                  

**************************************************************************************************

## 2018-04-12 NOTE — EDPHYS
Physician Documentation                                                                           

 [facility]                                                                

Name: Redd Dale Jr                                                                            

Age: 32 yrs                                                                                       

Sex: Male                                                                                         

: 1985                                                                                   

MRN: [id number]                                                                                   

Arrival Date: 2018                                                                          

Time: 01:55                                                                                       

Account#: [id number]                                                                            

Bed 4                                                                                             

Private MD:                                                                                       

ED Physician Luis Cruz                                                                      

HPI:                                                                                              

                                                                                             

02:00 This 32 yrs old Black Male presents to ER via [facility] with complaints of fall out of [facility] 

      bed hit head.                                                                               

02:00 Trauma demographics: County: The injury occurred in [place]. Mechanism of injury:      [facility] 

      Fall: approximately approximately 3 feet. Associated injuries: The patient sustained        

      injury to the head, neck injury. Onset: The symptoms/episode began/occurred just prior      

      to arrival. The patient or guardian complains of decreased range of motion, pain. The       

      symptoms are located at the C1, C2, C3, C4 and C5. The patient complains of pain to the     

      top of head and forehead.                                                                   

                                                                                                  

Historical:                                                                                       

- Allergies:                                                                                      

02:10 Levofloxacin;                                                                           lp1 

02:10 Morphine;                                                                               lp1 

02:10 sulfamethoxazole;                                                                       lp1 

02:10 Vancomycin;                                                                             lp1 

02:10 Toradol;                                                                                lp1 

02:10 Zofran;                                                                                 lp1 

02:10 Amoxicillin;                                                                            lp1 

02:10 Ciprofloxacin;                                                                          lp1 

02:10 Doxycycline;                                                                            lp1 

02:10 Bactrim;                                                                                lp1 

02:10 CLAVULANIC ACID;                                                                        lp1 

02:10 TRIMETHOPRIM;                                                                           lp1 

- Home Meds:                                                                                      

02:10 pantoprazole 40 mg oral TbEC 1 tab once daily [Active]; zinc sulfate 220 (50) mg Oral   lp1 

      cap daily [Active]; metoprolol tartrate 25 mg Oral tab 1 tab 2 times per day [Active];      

      ProMod Protein Oral liqd 30 mL twice a day [Active]; Vitamin C 500 mg Oral tab twice a      

      day [Active];                                                                               

- PMHx:                                                                                           

02:10 Cluster headaches; Cerebral Palsy; Hypertension; GERD; decubitus ulcers on feet;        lp1 

      Asthma; Hydrocephalus; spina bifida;                                                        

- PSHx:                                                                                           

02:10  shunt; Cholecystectomy;                                                              lp1 

                                                                                                  

- Immunization history:: Adult Immunizations up to date.                                          

- Family history:: not pertinent.                                                                 

- Social history:: Smoking status: Patient uses tobacco products, smokes one-half pack            

  cigarettes per day.                                                                             

                                                                                                  

                                                                                                  

ROS:                                                                                              

02:00 Constitutional: Negative for fever, chills, and weight loss, Eyes: Negative for injury, arlin 

      pain, redness, and discharge, ENT: Negative for injury, pain, and discharge, Neck:          

      Negative for injury, pain, and swelling, Cardiovascular: Negative for chest pain,           

      palpitations, and edema, Respiratory: Negative for shortness of breath, cough,              

      wheezing, and pleuritic chest pain, Abdomen/GI: Negative for abdominal pain, nausea,        

      vomiting, diarrhea, and constipation, Back: Negative for injury and pain, : Negative      

      for injury, bleeding, discharge, and swelling, MS/Extremity: Negative for injury and        

      deformity, Skin: Negative for injury, rash, and discoloration, Psych: Negative for          

      depression, anxiety, suicide ideation, homicidal ideation, and hallucinations,              

      Allergy/Immunology: Negative for hives, rash, and allergies, Endocrine: Negative for        

      neck swelling, polydipsia, polyuria, polyphagia, and marked weight changes,                 

      Hematologic/Lymphatic: Negative for swollen nodes, abnormal bleeding, and unusual           

      bruising.                                                                                   

02:00 Neuro: Positive for headache.                                                               

                                                                                                  

Exam:                                                                                             

02:00 Constitutional:  This is a well developed, well nourished patient who is awake, alert,  arlin 

      and in no acute distress. Eyes:  Pupils equal round and reactive to light, extra-ocular     

      motions intact.  Lids and lashes normal.  Conjunctiva and sclera are non-icteric and        

      not injected.  Cornea within normal limits.  Periorbital areas with no swelling,            

      redness, or edema. ENT:  Nares patent. No nasal discharge, no septal abnormalities          

      noted.  Tympanic membranes are normal and external auditory canals are clear.               

      Oropharynx with no redness, swelling, or masses, exudates, or evidence of obstruction,      

      uvula midline.  Mucous membranes moist. Neck:  Trachea midline, no thyromegaly or           

      masses palpated, and no cervical lymphadenopathy.  Supple, full range of motion without     

      nuchal rigidity, or vertebral point tenderness.  No Meningismus. Chest/axilla:  Normal      

      chest wall appearance and motion.  Nontender with no deformity.  No lesions are             

      appreciated. Cardiovascular:  Regular rate and rhythm with a normal S1 and S2.  No          

      gallops, murmurs, or rubs.  Normal PMI, no JVD.  No pulse deficits. Respiratory:  Lungs     

      have equal breath sounds bilaterally, clear to auscultation and percussion.  No rales,      

      rhonchi or wheezes noted.  No increased work of breathing, no retractions or nasal          

      flaring. Abdomen/GI:  Soft, non-tender, with normal bowel sounds.  No distension or         

      tympany.  No guarding or rebound.  No evidence of tenderness throughout. Back:  No          

      spinal tenderness.  No costovertebral tenderness.  Full range of motion. Male :           

      Normal genitalia with no discharge or lesions. Skin:  Warm, dry with normal turgor.         

      Normal color with no rashes, no lesions, and no evidence of cellulitis. Neuro:  Awake       

      and alert, GCS 15, oriented to person, place, time, and situation.  Cranial nerves          

      II-XII grossly intact.  Motor strength 5/5 in all extremities.  Sensory grossly intact.     

       Cerebellar exam normal.  Normal gait. Psych:  Awake, alert, with orientation to            

      person, place and time.  Behavior, mood, and affect are within normal limits.               

02:00 Head/face: Noted is contusion, that is superficial.                                         

02:00 Musculoskeletal/extremity: ROM: the patient is contracted, Circulation is intact in all     

      extremities. decreased sensation, Compartment Syndrome exam of affected extremity: is       

      normal. Weight bearing: is unable to bear weight, DVT Exam: no pain, no tenderness,         

      negative Homans' sign noted on exam, no appreciated bluish discoloration, no erythema,      

      no increased warmth, swelling.                                                              

                                                                                                  

Vital Signs:                                                                                      

02:05  / 90; Pulse 97; Resp 18; Pulse Ox 97% on R/A; Weight 131.54 kg; Height 4 ft. 11  lp1 

      in. (149.86 cm); Pain 9/10;                                                                 

03:05  / 88; Pulse 92; Resp 17; Pulse Ox 98% ; Pain 10/10;                              bs1 

04:05  / 69; Pulse 73; Resp 16; Pulse Ox 93% on R/A; Pain 10/10;                        bs1 

05:05  / 78; Pulse 72; Resp 16; Pulse Ox 93% ; Pain 10/10;                              bs1 

06:09  / 70; Pulse 87; Resp 16; Pulse Ox 98% on R/A; Pain 10/10;                        bs1 

02:05 Body Mass Index 58.57 (131.54 kg, 149.86 cm)                                            lp1 

                                                                                                  

MDM:                                                                                              

01:57 Patient medically screened.                                                             [facility] 

02:00 Data reviewed: vital signs, nurses notes, radiologic studies, CT scan.                  [facility] 

                                                                                                  

                                                                                             

01:59 Order name: CT Head C Spine                                                             arlin 

                                                                                                  

Administered Medications:                                                                         

02:30 Drug: fentaNYL (PF) 50 mcg {Note: left upper arm.} Route: IVP; Site: PICC;              bs1 

02:58 Follow up: Response: No adverse reaction                                                bs1 

02:40 Not Given (Patient Refused; allergic to zofran): Zofran 4 mg IVP once; over 2 minutes   bs1 

06:41 Not Given (Duplicate Order): fentaNYL (PF) 50 mcg IVP once                              arlin 

06:41 Not Given (Duplicate Order): Phenergan 12.5 mg IVP once                                 arlin 

08:00 Drug: fentaNYL (PF) 50 mcg Route: IVP; Site: PICC;                                        

                                                                                                  

                                                                                                  

Disposition:                                                                                      

18 06:36 Discharged to Home. Impression: Superficial injury of head, Strain of muscle,      

  fascia and tendon at neck level, Fall due to bumping against object.                            

- Condition is Stable.                                                                            

- Discharge Instructions: Head Injury, Adult, Fall Prevention and Home Safety,                    

  Ventriculoperitoneal Shunt Home Guide, Cervical Sprain, Easy-to-Read,                           

  Ventriculoperitoneal Shunt Placement, Care After.                                               

                                                                                                  

- Medication Reconciliation Form, Thank You Letter, Antibiotic Education, Prescription            

  Opioid Use form.                                                                                

- Follow up: Private Physician; When: 2 - 3 days; Reason: Recheck today's complaints,             

  Continuance of care, Re-evaluation by your physician.                                           

- Problem is new.                                                                                 

- Symptoms have improved.                                                                         

                                                                                                  

                                                                                                  

                                                                                                  

Signatures:                                                                                       

Dispatcher [employer]                           EDMS                                                 

Hi Espinoza, RN                         Luis Solares MD MD cha Pena, Laura, RN                         RN   lp1                                                  

Josefa Bryson RN RN hb Salazar, Brittany RN                   RN   bs1                                                  

                                                                                                  

**************************************************************************************************

## 2018-04-12 NOTE — RAD REPORT
EXAM DESCRIPTION:  CT - Head C Spine Mpr Wo Con - 4/12/2018 6:49 am

 

CLINICAL HISTORY:  Head and neck injury status post fall. Head and neck pain fell out of bed and hit 
head

 

COMPARISON:  2016

 

TECHNIQUE:  Computed axial tomography of the head and cervical spine was obtained.

 

Sagittal and coronal reconstruction was performed.A preliminary report was generated by virtual radio
logic and reviewed prior to dictation

 

All CT scans are performed using dose optimization technique as appropriate and may include automated
 exposure control or mA/KV adjustment according to patient size.

 

FINDINGS:  An intracranial bleed is not seen. The ventricles are small. They are unchanged in caliber
 from the prior exam. . An extra-axial fluid collection is not noted. Dysgenesis of the corpus callos
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 Ventricular shunts are unchanged in position. Fluid within the visualized sinuses an
d mastoids is not seen

 

A cervical fracture is not visualized. No dislocation is noted.

 

IMPRESSION:  No acute intracranial abnormality is seen.

 

A cervical fracture is not visualized.  If the patient continues to have symptoms to suggest intracra
nial /spinal cord pathology then MRI would be recommended

## 2018-04-12 NOTE — XMS REPORT
Patient Summary Document

 Created on:2018



Patient:KAITLIN VERDIN

Sex:Male

:1985

External Reference #:[id number]





Demographics







 Address  [address]



   [place] [address]

 

 Home Phone  [phone]

 

 Work Phone  [phone]

 

 Email Address  [phone]

 

 Preferred Language  Unknown

 

 Marital Status  Unknown

 

 Worship Affiliation  Unknown

 

 Race  Unknown

 

 Additional Race(s)  Unavailable

 

 Ethnic Group  Unknown









Author







 Organization  [facility]

 

 Address  [address] [place] [address] [address]



   [place] [address]

 

 Phone  [phone]









Care Team Providers







 Name  Role  Phone

 

 RALPH TORRES  Unavailable  Unavailable









Problems

This patient has no known problems.



Allergies, Adverse Reactions, Alerts

This patient has no known allergies or adverse reactions.



Medications

This patient has no known medications.



Results







 Test Description  Test Time  Test Comments  Text Results  Atomic Results  
Result Comments









 BLOOD CULTURE  2017 16:22:00    









   Test Item  Value  Reference Range  Comments









 CULTURE (BEAKER) (test code=1095)  No growth in 5 days    



BLOOD [id number] 16:22:00





 Test Item  Value  Reference Range  Comments

 

 CULTURE (BEAKER) (test code=1095)  No growth in 5 days    



(MANUAL DIFFERENTIAL)2017 22:29:00





 Test Item  Value  Reference Range  Comments

 

 TOTAL COUNTED (BEAKER) (test code=1351)      

 

 WBC MORPHOLOGY (BEAKER) (test code=487)  Normal    

 

 PLT MORPHOLOGY (BEAKER) (test code=486)  Normal    

 

 RBC MORPHOLOGY (BEAKER) (test code=762)  Normal    



CBC W/PLT COUNT &amp; AUTO [id number] 22:28:00





 Test Item  Value  Reference Range  Comments

 

 WHITE BLOOD CELL COUNT (BEAKER) (test code=775)  7.3 K/ L  4.0-10.0  

 

 RED BLOOD CELL COUNT (BEAKER) (test code=761)  5.09 M/ L  4.20-5.80  

 

 HEMOGLOBIN (BEAKER) (test code=410)  13.0 GM/DL  13.0-16.8  

 

 HEMATOCRIT (BEAKER) (test code=411)  42.3 %  40.0-50.0  

 

 MEAN CORPUSCULAR VOLUME (BEAKER) (test code=753)  83.0 fL  82.0-98.0  

 

 MEAN CORPUSCULAR HEMOGLOBIN (BEAKER) (test  25.6 pg  27.0-33.0  



 code=751)      

 

 MEAN CORPUSCULAR HEMOGLOBIN CONC (BEAKER) (test  30.8 GM/DL  32.0-36.0  



 code=752)      

 

 RED CELL DISTRIBUTION WIDTH (BEAKER) (test  18.0 %  10.3-14.2  



 code=412)      

 

 PLATELET COUNT (BEAKER) (test code=756)  331 K/CU MM  150-430  

 

 MEAN PLATELET VOLUME (BEAKER) (test code=754)  8.5 fL  6.5-10.5  

 

 NUCLEATED RED BLOOD CELLS (BEAKER) (test  0 /100 WBC  0-0  



 code=413)      

 

 NEUTROPHILS RELATIVE PERCENT (BEAKER) (test  65 %    



 code=429)      

 

 LYMPHOCYTES RELATIVE PERCENT (BEAKER) (test  23 %    



 code=430)      

 

 MONOCYTES RELATIVE PERCENT (BEAKER) (test  8 %    



 code=431)      

 

 EOSINOPHILS RELATIVE PERCENT (BEAKER) (test  3 %    



 code=432)      

 

 BASOPHILS RELATIVE PERCENT (BEAKER) (test  1 %    



 code=437)      

 

 NEUTROPHILS ABSOLUTE COUNT (BEAKER) (test  4.75 K/ L  1.80-8.00  



 code=670)      

 

 LYMPHOCYTES ABSOLUTE COUNT (BEAKER) (test  1.68 K/ L  1.48-4.50  



 code=414)      

 

 MONOCYTES ABSOLUTE COUNT (BEAKER) (test  0.55 K/ L  0.00-1.30  



 code=415)      

 

 EOSINOPHILS ABSOLUTE COUNT (BEAKER) (test  0.24 K/ L  0.00-0.50  



 code=416)      

 

 BASOPHILS ABSOLUTE COUNT (BEAKER) (test  0.05 K/ L  0.00-0.20  



 code=417)      



[id number] METABOLIC [id number] 11:11:00





 Test Item  Value  Reference Range  Comments

 

 SODIUM (BEAKER) (test  138 meq/L  136-145  



 aqfo=560)      

 

 POTASSIUM (BEAKER) (test  4.0 meq/L  3.5-5.1  



 code=379)      

 

 CHLORIDE (BEAKER) (test  108 meq/L    



 code=382)      

 

 CO2 (BEAKER) (test  23 meq/L  22-29  



 code=355)      

 

 BLOOD UREA NITROGEN  11 mg/dL  7-21  



 (BEAKER) (test code=354)      

 

 CREATININE (BEAKER) (test  0.74 mg/dL  0.57-1.25  



 code=358)      

 

 GLUCOSE RANDOM (BEAKER)  124 mg/dL    



 (test code=652)      

 

 CALCIUM (BEAKER) (test  8.9 mg/dL  8.4-10.2  



 code=697)      

 

 EGFR (BEAKER) (test  150 mL/min/1.73 sq m    ESTIMATED GFR IS NOT AS



 code=1092)      ACCURATE AS CREATININE



       CLEARANCE IN PREDICTING



       GLOMERULAR FILTRATION



       RATE. ESTIMATED GFR IS



       NOT APPLICABLE FOR



       DIALYSIS PATIENTS.



URINE [id number] 09:56:00





 Test Item  Value  Reference Range  Comments

 

 CULTURE (BEAKER) (test  &lt;10,000 col/mL skin jaime    



 code=1095)      



COMPREHENSIVE METABOLIC [id number] 08:49:00





 Test Item  Value  Reference Range  Comments

 

 TOTAL PROTEIN (BEAKER)  7.3 gm/dL  6.0-8.3  



 (test code=770)      

 

 ALBUMIN (BEAKER) (test  3.3 g/dL  3.5-5.0  



 code=1145)      

 

 ALKALINE PHOSPHATASE  89 U/L    



 (BEAKER) (test code=346)      

 

 BILIRUBIN TOTAL (BEAKER)  1.4 mg/dL  0.2-1.2  



 (test code=377)      

 

 SODIUM (BEAKER) (test  137 meq/L  136-145  



 qmvs=012)      

 

 POTASSIUM (BEAKER) (test  3.7 meq/L  3.5-5.1  



 code=379)      

 

 CHLORIDE (BEAKER) (test  108 meq/L    



 code=382)      

 

 CO2 (BEAKER) (test  17 meq/L  22-29  



 code=355)      

 

 BLOOD UREA NITROGEN  12 mg/dL  7-21  



 (BEAKER) (test code=354)      

 

 CREATININE (BEAKER) (test  0.78 mg/dL  0.57-1.25  



 code=358)      

 

 GLUCOSE RANDOM (BEAKER)  119 mg/dL    



 (test code=652)      

 

 CALCIUM (BEAKER) (test  8.6 mg/dL  8.4-10.2  



 code=697)      

 

 AST (SGOT) (BEAKER) (test  13 U/L  5-34  



 code=353)      

 

 ALT (SGPT) (BEAKER) (test  28 U/L  6-55  



 code=347)      

 

 EGFR (BEAKER) (test  141 mL/min/1.73 sq    ESTIMATED GFR IS NOT AS



 code=1092)  m    ACCURATE AS CREATININE



       CLEARANCE IN PREDICTING



       GLOMERULAR FILTRATION



       RATE. ESTIMATED GFR IS



       NOT APPLICABLE FOR



       DIALYSIS PATIENTS.



CBC W/PLT COUNT &amp; AUTO [id number] 08:46:00





 Test Item  Value  Reference Range  Comments

 

 WHITE BLOOD CELL COUNT (BEAKER) (test code=775)  9.4 K/ L  4.0-10.0  

 

 RED BLOOD CELL COUNT (BEAKER) (test code=761)  4.79 M/ L  4.20-5.80  

 

 HEMOGLOBIN (BEAKER) (test code=410)  12.8 GM/DL  13.0-16.8  

 

 HEMATOCRIT (BEAKER) (test code=411)  40.0 %  40.0-50.0  

 

 MEAN CORPUSCULAR VOLUME (BEAKER) (test code=753)  83.4 fL  82.0-98.0  

 

 MEAN CORPUSCULAR HEMOGLOBIN (BEAKER) (test  26.7 pg  27.0-33.0  



 code=751)      

 

 MEAN CORPUSCULAR HEMOGLOBIN CONC (BEAKER) (test  32.0 GM/DL  32.0-36.0  



 code=752)      

 

 RED CELL DISTRIBUTION WIDTH (BEAKER) (test  18.2 %  10.3-14.2  



 code=412)      

 

 PLATELET COUNT (BEAKER) (test code=756)  313 K/CU MM  150-430  

 

 MEAN PLATELET VOLUME (BEAKER) (test code=754)  8.6 fL  6.5-10.5  

 

 NUCLEATED RED BLOOD CELLS (BEAKER) (test  0 /100 WBC  0-0  



 code=413)      

 

 NEUTROPHILS RELATIVE PERCENT (BEAKER) (test  70 %    



 code=429)      

 

 LYMPHOCYTES RELATIVE PERCENT (BEAKER) (test  16 %    



 code=430)      

 

 MONOCYTES RELATIVE PERCENT (BEAKER) (test  12 %    



 code=431)      

 

 EOSINOPHILS RELATIVE PERCENT (BEAKER) (test  1 %    



 code=432)      

 

 BASOPHILS RELATIVE PERCENT (BEAKER) (test  1 %    



 code=437)      

 

 NEUTROPHILS ABSOLUTE COUNT (BEAKER) (test  6.54 K/ L  1.80-8.00  



 code=670)      

 

 LYMPHOCYTES ABSOLUTE COUNT (BEAKER) (test  1.50 K/ L  1.48-4.50  



 code=414)      

 

 MONOCYTES ABSOLUTE COUNT (BEAKER) (test  1.13 K/ L  0.00-1.30  



 code=415)      

 

 EOSINOPHILS ABSOLUTE COUNT (BEAKER) (test  0.13 K/ L  0.00-0.50  



 code=416)      

 

 BASOPHILS ABSOLUTE COUNT (BEAKER) (test  0.06 K/ L  0.00-0.20  



 code=417)      



0.00URINALYSIS W/ [id number] 20:36:00





 Test Item  Value  Reference Range  Comments

 

 COLOR (BEAKER) (test code=470)  Yellow    

 

 CLARITY (BEAKER) (test code=469)  Hazy    

 

 SPECIFIC GRAVITY UA (BEAKER) (test code=468)  1.012  1.001-1.035  

 

 PH UA (BEAKER) (test code=467)  5.5  5.0-8.0  

 

 PROTEIN UA (BEAKER) (test code=464)  100 mg/dL  Negative  

 

 GLUCOSE UA (BEAKER) (test code=365)  Negative  Negative  

 

 KETONES UA (BEAKER) (test code=371)  Negative  Negative  

 

 BILIRUBIN UA (BEAKER) (test code=462)  Negative  Negative  

 

 BLOOD UA (BEAKER) (test code=461)  Moderate  Negative  

 

 NITRITE UA (BEAKER) (test code=465)  Negative  Negative  

 

 LEUKOCYTE ESTERASE UA (BEAKER) (test code=466)  Large  Negative  

 

 UROBILINOGEN UA (BEAKER) (test code=463)  3.0 mg/dL  0.2-1.0  

 

 RBC UA (BEAKER) (test code=519)  19 /HPF    

 

 WBC UA (BEAKER) (test code=520)  182 /HPF    

 

 SOURCE(BEAKER) (test code=7891)      



BASIC METABOLIC [id number] 17:00:00





 Test Item  Value  Reference Range  Comments

 

 SODIUM (BEAKER) (test  140 meq/L  136-145  



 ejgh=947)      

 

 POTASSIUM (BEAKER) (test  3.7 meq/L  3.5-5.1  



 code=379)      

 

 CHLORIDE (BEAKER) (test  112 meq/L    



 code=382)      

 

 CO2 (BEAKER) (test  17 meq/L  22-29  



 code=355)      

 

 BLOOD UREA NITROGEN  23 mg/dL  7-21  



 (BEAKER) (test code=354)      

 

 CREATININE (BEAKER) (test  1.00 mg/dL  0.57-1.25  



 code=358)      

 

 GLUCOSE RANDOM (BEAKER)  102 mg/dL    



 (test code=652)      

 

 CALCIUM (BEAKER) (test  8.7 mg/dL  8.4-10.2  



 code=697)      

 

 EGFR (BEAKER) (test  106 mL/min/1.73 sq m    ESTIMATED GFR IS NOT AS



 code=1092)      ACCURATE AS CREATININE



       CLEARANCE IN PREDICTING



       GLOMERULAR FILTRATION



       RATE. ESTIMATED GFR IS



       NOT APPLICABLE FOR



       DIALYSIS PATIENTS.



Specimen slightly ictericCBC W/PLT COUNT &amp; AUTO [id number] 12:18
:00





 Test Item  Value  Reference Range  Comments

 

 WHITE BLOOD CELL COUNT (BEAKER) (test code=775)  16.4 K/ L  4.0-10.0  

 

 RED BLOOD CELL COUNT (BEAKER) (test code=761)  5.22 M/ L  4.20-5.80  

 

 HEMOGLOBIN (BEAKER) (test code=410)  14.4 GM/DL  13.0-16.8  

 

 HEMATOCRIT (BEAKER) (test code=411)  42.9 %  40.0-50.0  

 

 MEAN CORPUSCULAR VOLUME (BEAKER) (test code=753)  82.2 fL  82.0-98.0  

 

 MEAN CORPUSCULAR HEMOGLOBIN (BEAKER) (test  27.5 pg  27.0-33.0  



 code=751)      

 

 MEAN CORPUSCULAR HEMOGLOBIN CONC (BEAKER) (test  33.5 GM/DL  32.0-36.0  



 code=752)      

 

 RED CELL DISTRIBUTION WIDTH (BEAKER) (test  16.2 %  10.3-14.2  



 code=412)      

 

 PLATELET COUNT (BEAKER) (test code=756)  329 K/CU MM  150-430  

 

 MEAN PLATELET VOLUME (BEAKER) (test code=754)  8.2 fL  6.5-10.5  

 

 NUCLEATED RED BLOOD CELLS (BEAKER) (test  0 /100 WBC  0-0  



 code=413)      

 

 NEUTROPHILS RELATIVE PERCENT (BEAKER) (test  83 %    



 code=429)      

 

 LYMPHOCYTES RELATIVE PERCENT (BEAKER) (test  7 %    



 code=430)      

 

 MONOCYTES RELATIVE PERCENT (BEAKER) (test  9 %    



 code=431)      

 

 EOSINOPHILS RELATIVE PERCENT (BEAKER) (test  0 %    



 code=432)      

 

 BASOPHILS RELATIVE PERCENT (BEAKER) (test  0 %    



 code=437)      

 

 NEUTROPHILS ABSOLUTE COUNT (BEAKER) (test  1.62 K/ L  1.80-8.00  



 code=670)      

 

 LYMPHOCYTES ABSOLUTE COUNT (BEAKER) (test  1.15 K/ L  1.48-4.50  



 code=414)      

 

 MONOCYTES ABSOLUTE COUNT (BEAKER) (test  1.56 K/ L  0.00-1.30  



 code=415)      

 

 EOSINOPHILS ABSOLUTE COUNT (BEAKER) (test  0.01 K/ L  0.00-0.50  



 code=416)      

 

 BASOPHILS ABSOLUTE COUNT (BEAKER) (test  0.02 K/ L  0.00-0.20  



 code=417)      



(MANUAL DIFFERENTIAL)2017 12:18:00





 Test Item  Value  Reference Range  Comments

 

 TOTAL COUNTED (BEAKER) (test code=1351)      

 

 WBC MORPHOLOGY (BEAKER) (test code=487)  Normal    

 

 PLT MORPHOLOGY (BEAKER) (test code=486)  Normal    

 

 RBC MORPHOLOGY (BEAKER) (test code=762)  Normal

## 2018-04-12 NOTE — XMS REPORT
Clinical Summary

 Created on:2018



Patient:Redd Dale

Sex:Male

:1985

External Reference #:[id number]





Demographics







 Address  [address]



   [place] [address]

 

 Home Phone  [phone]

 

 Preferred Language  English

 

 Marital Status  Unknown

 

 Taoist Affiliation  Unknown

 

 Race  Black or 

 

 Ethnic Group  Not  or 









Author







 Organization  [facility]

 

 Address  [address] [place] [address]

 

 Phone  [phone]









Support







 Name  Relationship  Address  Phone

 

 Unavailable  Unavailable  [address]  [phone]



     [place] [address]  









Care Team Providers







 Name  Role  Phone

 

 Unavailable  Primary Care Provider  Unavailable









Allergies







 Active Allergy  Reactions  Severity  Noted Date  Comments

 

 Sulfamethoxazole-Trimethoprim  Hives  High  2017  

 

 Levofloxacin  Hives  High  2017  

 

 Morphine  Hives  High  2017  

 

 Ketorolac  Rash  High  2017  

 

 Vancomycin Analogues  Rash  High  2017  

 

 Sesame Seed  Hives    2017  

 

 Ondansetron Hcl (Pf)  Nausea And Vomiting    2017  







Current Medications







 Prescription  Sig.  Disp.  Refills  Start Date  End Date  Status

 

 oxyCODONE-acetaminoph  Take 1 tablet          Active



 en (PERCOCET)   by mouth every          



 mg per tablet  4 (four) hours          



   as needed for          



   Pain.          

 

 cefdinir (OMNICEF)  Take 1 capsule  26 capsule  0  2017  




 300 MG capsule  (300 mg total)          



   by mouth every          



   12 (twelve)          



   hours for 13          



   days.          







Active Problems







 Problem  Noted Date

 

 Spina bifida (HCC)  2017

 

 Pyelonephritis  2017







Encounters







 Date  Type  Specialty  Care Team  Description

 

 06/10/2017 -  Hospital Encounter  General Internal  Cesar, Chimkama  
Pyelonephritis;Sepsis



 2017    Medicine  MD Jennifer



  , due to unspecified



       Walls, Jose Martin  organism ([facility]);Sierra Tejada MD



  bifida, unspecified



       Josefa Hardwick  hydrocephalus



       MD Kiara  presence, unspecified



         spinal region ([facility])



after 2017



Social History







 Tobacco Use  Types  Packs/Day  Years Used  Date

 

 Current Every Day Smoker  Cigarettes  0.5    









 Tobacco Cessation: Ready to Quit: No









 Sex Assigned at Birth  Date Recorded

 

 Not on file  







Last Filed Vital Signs







 Vital Sign  Reading  Time Taken

 

 Blood Pressure  128/66  2017 11:18 AM CDT

 

 Pulse  89  2017 11:18 AM CDT

 

 Temperature  36.6 C (97.8 F)  2017 11:18 AM CDT

 

 Respiratory Rate  19  2017 11:18 AM CDT

 

 Oxygen Saturation  95%  2017  3:43 AM CDT

 

 Inhaled Oxygen Concentration  -  -

 

 Weight  131.1 kg (289 lb)  2017  1:00 AM CDT

 

 Height  149.9 cm (4' 11")  2017  1:00 AM CDT

 

 Body Mass Index  58.37  2017  1:00 AM CDT







Plan of Treatment

Not on file



Results

RHYTHM STRIP - SCAN (2017 10:10 AM)Manual Differential (2017  9:58 
AM)Only the most recent of2 resultswithin the time period is included.





 Component  Value  Ref Range

 

 Total Counted    

 

 WBC Morphology  Normal  

 

 Platelet Morphology  Normal  

 

 RBC Morphology  Normal  









 Specimen  Performing Laboratory

 

 Blood - Arm, [address] [address]



CBC with platelet count + automated diff (2017  9:58 AM)Only the most 
recent of3 resultswithin the time period is included.





 Component  Value  Ref Range

 

 WBC  7.3  4.0 - 10.0 K/L

 

 RBC  5.09  4.20 - 5.80 M/L

 

 Hemoglobin  13.0  13.0 - 16.8 GM/DL

 

 Hematocrit  42.3  40.0 - 50.0 %

 

 MCV  83.0  82.0 - 98.0 fL

 

 MCH  25.6 (L)  27.0 - 33.0 pg

 

 MCHC  30.8 (L)  32.0 - 36.0 GM/DL

 

 RDW  18.0 (H)  10.3 - 14.2 %

 

 Platelets  331  150 - 430 K/CU MM

 

 MPV  8.5  6.5 - 10.5 fL

 

 nRBC  0  0 - 0 /100 WBC

 

 % Neutros  65  %

 

 % Lymphs  23  %

 

 % Monos  8  %

 

 % Eos  3  %

 

 % Baso  1  %

 

 # Neutros  4.75  1.80 - 8.00 K/L

 

 # Lymphs  1.68  1.48 - 4.50 K/L

 

 # Monos  0.55  0.00 - 1.30 K/L

 

 # Eos  0.24  0.00 - 0.50 K/L

 

 # Baso  0.05  0.00 - 0.20 K/L









 Specimen  Performing Laboratory

 

 Blood - Arm, [address] [address]









 Narrative

 

 0.00







 0.52







 0.00







 0.00







 0.00







 0.00



CBC with platelet count + automated diff (2017  9:58 AM)Only the most 
recent of3 resultswithin the time period is included.





 Specimen  Performing Laboratory

 

 Blood  









 Narrative

 

 The following orders were created for panel order CBC with platelet count + 
automated



 diff.







 Procedure



 Abnormality Status 







 ---------



 ----------- ------ 







 CBC with platelet count ...[[id number]]AbnormalFinal



 result 







 Manual



 Differential[[id number]]
Fi



 nal result 







  







 Please view results for these tests on the individual orders.



Basic Metabolic Panel (2017  9:58 AM)Only the most recent of2 
resultswithin the time period is included.





 Component  Value  Ref Range

 

 Sodium  138  136 - 145 meq/L

 

 Potassium  4.0  3.5 - 5.1 meq/L

 

 Chloride  108 (H)  98 - 107 meq/L

 

 CO2  23  22 - 29 meq/L

 

 BUN  11  7 - 21 mg/dL

 

 Creatinine  0.74  0.57 - 1.25 mg/dL

 

 Glucose  124 (H)  70 - 105 mg/dL

 

 Calcium  8.9  8.4 - 10.2 mg/dL

 

 EGFR  150Comment: ESTIMATED GFR IS NOT AS ACCURATE AS  mL/min/1.73 sq m



   CREATININE CLEARANCE IN PREDICTING GLOMERULAR FILTRATION  



   RATE. ESTIMATED GFR IS NOT APPLICABLE FOR DIALYSIS  



   PATIENTS.  









 Specimen  Performing Laboratory

 

 Blood - Arm, [address] [address]



Comprehensive metabolic panel (2017  8:09 AM)





 Component  Value  Ref Range

 

 Protein, Total  7.3  6.0 - 8.3 gm/dL

 

 Albumin  3.3 (L)  3.5 - 5.0 g/dL

 

 Alkaline Phosphatase  89  40 - 150 U/L

 

 Total Bilirubin  1.4 (H)  0.2 - 1.2 mg/dL

 

 Sodium  137  136 - 145 meq/L

 

 Potassium  3.7  3.5 - 5.1 meq/L

 

 Chloride  108 (H)  98 - 107 meq/L

 

 CO2  17 (L)  22 - 29 meq/L

 

 BUN  12  7 - 21 mg/dL

 

 Creatinine  0.78  0.57 - 1.25 mg/dL

 

 Glucose  119 (H)  70 - 105 mg/dL

 

 Calcium  8.6  8.4 - 10.2 mg/dL

 

 AST  13  5 - 34 U/L

 

 ALT  28  6 - 55 U/L

 

 EGFR  141Comment: ESTIMATED GFR IS NOT AS ACCURATE  mL/min/1.73 sq m



   AS CREATININE CLEARANCE IN PREDICTING  



   GLOMERULAR FILTRATION RATE. ESTIMATED GFR IS  



   NOT APPLICABLE FOR DIALYSIS PATIENTS.  









 Specimen  Performing Laboratory

 

 Blood - Arm, [address] [address]



Urinalysis w/ Microscopic (2017  4:57 AM)





 Component  Value  Ref Range

 

 Color, UA  Yellow  

 

 Clarity, UA  Hazy  

 

 Specific Gravity, UA  1.012  1.001 - 1.035

 

 pH, UA  5.5  5.0 - 8.0

 

 Protein, UA  100 mg/dL (A)  Negative

 

 Glucose, UA  Negative  Negative

 

 Ketones, UA  Negative  Negative

 

 Bilirubin, UA  Negative  Negative

 

 Blood, UA  Moderate (A)  Negative

 

 Nitrite, UA  Negative  Negative

 

 Leukocytes, UA  Large (A)  Negative

 

 Urobilinogen, UA  3.0 (H)  0.2 - 1.0 mg/dL

 

 RBC, UA  19  /HPF

 

 WBC, UA  182  /HPF

 

 Specimen Source    









 Specimen  Performing Laboratory

 

 Urine  [address] [address]



Urine culture (2017  4:57 AM)





 Component  Value  Ref Range

 

 Result  &lt;10,000 col/mL skin jaime  









 Specimen  Performing Laboratory

 

 Urine - Urine, Urostomy  [address] [address]



Blood culture #1 (2017  3:26 AM)Only the most recent of2 resultswithin 
the time period is included.





 Component  Value  Ref Range

 

 Result  No growth in 5 days  









 Specimen  Performing Laboratory

 

 Blood - Arm, [address] [address]



after 2017

## 2018-05-03 ENCOUNTER — HOSPITAL ENCOUNTER (EMERGENCY)
Dept: HOSPITAL 97 - ER | Age: 33
Discharge: ANOTHER HOSPITAL | End: 2018-05-03
Payer: COMMERCIAL

## 2018-05-03 VITALS — OXYGEN SATURATION: 100 % | DIASTOLIC BLOOD PRESSURE: 92 MMHG | SYSTOLIC BLOOD PRESSURE: 139 MMHG

## 2018-05-03 VITALS — TEMPERATURE: 98.9 F

## 2018-05-03 DIAGNOSIS — T85.09XA: ICD-10-CM

## 2018-05-03 DIAGNOSIS — Z88.8: ICD-10-CM

## 2018-05-03 DIAGNOSIS — Z88.5: ICD-10-CM

## 2018-05-03 DIAGNOSIS — Q05.9: ICD-10-CM

## 2018-05-03 DIAGNOSIS — I10: ICD-10-CM

## 2018-05-03 DIAGNOSIS — R11.2: Primary | ICD-10-CM

## 2018-05-03 DIAGNOSIS — Z88.1: ICD-10-CM

## 2018-05-03 DIAGNOSIS — Z88.2: ICD-10-CM

## 2018-05-03 DIAGNOSIS — Z88.3: ICD-10-CM

## 2018-05-03 LAB
HCT VFR BLD CALC: 48.7 % (ref 39.6–49)
INR BLD: 1.01
LYMPHOCYTES # SPEC AUTO: 1.7 K/UL (ref 0.7–4.9)
MCH RBC QN AUTO: 27.5 PG (ref 27–35)
MCV RBC: 83.6 FL (ref 80–100)
PMV BLD: 8.7 FL (ref 7.6–11.3)
RBC # BLD: 5.83 M/UL (ref 4.33–5.43)

## 2018-05-03 PROCEDURE — 99285 EMERGENCY DEPT VISIT HI MDM: CPT

## 2018-05-03 PROCEDURE — 70450 CT HEAD/BRAIN W/O DYE: CPT

## 2018-05-03 PROCEDURE — 85025 COMPLETE CBC W/AUTO DIFF WBC: CPT

## 2018-05-03 PROCEDURE — 85610 PROTHROMBIN TIME: CPT

## 2018-05-03 PROCEDURE — 96375 TX/PRO/DX INJ NEW DRUG ADDON: CPT

## 2018-05-03 PROCEDURE — 85730 THROMBOPLASTIN TIME PARTIAL: CPT

## 2018-05-03 PROCEDURE — 36415 COLL VENOUS BLD VENIPUNCTURE: CPT

## 2018-05-03 PROCEDURE — 96374 THER/PROPH/DIAG INJ IV PUSH: CPT

## 2018-05-03 NOTE — RAD REPORT
EXAM DESCRIPTION:  CT - Head Brain Wo Cont - 5/3/2018 2:05 pm

 

CLINICAL HISTORY:  Headache, possible shunt malfunction

 

COMPARISON:  CT head April 12, 2018; CT study September 2017

 

TECHNIQUE:  Axial 5 mm thick images of the head were obtained without IV contrast.

 

All CT scans are performed using dose optimization technique as appropriate and may include automated
 exposure control or mA/KV adjustment according to patient size.

 

FINDINGS:  No intracranial hemorrhage is present. No mass, edema or other acute intracranial finding 
identifiable. Patient has right frontal and right parietal shunt tubes in place. Benign calcification
s seen along the inner table left parietal bone. Ventricular system does not appear to be dilated. Ho
wever, ventricles are slightly larger than seen on April 12. Currently ventricles are smaller than se
en September 2017. No cortical edema or acute cortical based infarction.

 

Mastoid air cells and visualized portions of the paranasal sinuses are clear.

 

No acute bony findings.

 

 

IMPRESSION:  No hemorrhage, mass or acute intracranial finding identifiable.

 

Ventricles are not dilated but do appear slightly larger than April 12th. Ventricular size is less th
an was seen September 2017.

## 2018-05-03 NOTE — ER
Nurse's Notes                                                                                     

 [facility]                                                                

Name: Redd Dale Jr                                                                            

Age: 32 yrs                                                                                       

Sex: Male                                                                                         

: 1985                                                                                   

MRN: [id number]                                                                                   

Arrival Date: 2018                                                                          

Time: 13:15                                                                                       

Account#: [id number]                                                                            

Bed 30                                                                                            

Private MD:                                                                                       

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                    

  shunt;Headache;Nausea and vomiting                                                              

                                                                                                  

Presentation:                                                                                     

                                                                                             

13:15 Presenting complaint: Patient states: "I think my shunt is messing up. I have had a     lk1 

      horrible headache for 2 days and nothing is helping it. The last time this happened it      

      was a problem with my shunt and that was 12 years ago.". Transition of care: patient        

      was received from another setting of care (long-term care facility), [facility]. Onset of symptoms was May 1, 2018. Initial Sepsis Screen: Does the patient      

      meet any 2 criteria? No. Patient's initial sepsis screen is negative. Does the patient      

      have a suspected source of infection? No. Patient's initial sepsis screen is negative.      

      Care prior to arrival: Medication(s) given: Phenergan, 12.5 mg, Fentanyl 65mcg.             

13:15 Method Of Arrival: EMS: [facility]                                                lk1 

13:15 Acuity: AYESHA 3                                                                           lk1 

                                                                                                  

Triage Assessment:                                                                                

13:23 General: Appears in no apparent distress. Behavior is calm, cooperative, appropriate    lk1 

      for age. Pain: Complains of pain in head Pain currently is 10 out of 10 on a pain           

      scale. EENT: No signs and/or symptoms were reported regarding the EENT system. Neuro:       

      Level of Consciousness is awake, alert, obeys commands, Oriented to person, place,          

      time, situation, Speech is normal, Facial symmetry appears normal, Pupils are PERRLA.       

      Neuro: Reports dizziness, headache. Cardiovascular: Heart tones S1 S2 present Capillary     

      refill is brisk Patient's skin is warm and dry. Respiratory: Airway is patent               

      Respiratory effort is even, unlabored, Respiratory pattern is regular, symmetrical. GI:     

      Abdomen is non-distended, obese, Bowel sounds present X 4 quads. Reports nausea. : No     

      signs and/or symptoms were reported regarding the genitourinary system. Derm: No signs      

      and/or symptoms reported regarding the dermatologic system. Musculoskeletal: No signs       

      and/or symptoms reported regarding the musculoskeletal system.                              

                                                                                                  

Historical:                                                                                       

- Allergies:                                                                                      

13:23 Amoxicillin;                                                                            lk1 

13:23 Bactrim;                                                                                lk1 

13:23 Ciprofloxacin;                                                                          lk1 

13:23 CLAVULANIC ACID;                                                                        lk1 

13:23 Doxycycline;                                                                            lk1 

13:23 Levofloxacin;                                                                           lk1 

13:23 Morphine;                                                                               lk1 

13:23 sulfamethoxazole;                                                                       lk1 

13:23 Toradol;                                                                                lk1 

13:23 TRIMETHOPRIM;                                                                           lk1 

13:23 Vancomycin;                                                                             lk1 

13:23 Zofran;                                                                                 lk1 

- PMHx:                                                                                           

13:23 Asthma; Cerebral Palsy; cluster headaches; decubitus ulcers on feet; GERD;              lk1 

      Hydrocephalus; Hypertension; spina bifida;                                                  

- PSHx:                                                                                           

13:23  shunt; Cholecystectomy;                                                              lk1 

                                                                                                  

- Immunization history:: Adult Immunizations up to date.                                          

- Social history:: Smoking status: Patient/guardian denies using tobacco.                         

                                                                                                  

                                                                                                  

Screenin:26 Abuse screen: Denies threats or abuse. Denies injuries from another. Nutritional        lk1 

      screening: No deficits noted. Tuberculosis screening: No symptoms or risk factors           

      identified. Fall Risk Total Kim Fall Scale indicates High Risk Score (45 or more          

      points). Fall prevention measures have been instituted. Side Rails Up X 2 Placed Close      

      to Nursing Station Frequent Obs/Assessments Occuring As available patient and family        

      educated on Fall Prevention Program and Strategies.                                         

                                                                                                  

Assessment:                                                                                       

14:15 Reassessment: Patient and/or family updated on plan of care and expected duration. Pain lk1 

      level reassessed. Patient is alert, oriented x 3, equal unlabored respirations, skin        

      warm/dry/pink. Patient reports no change in pain. PA notified. Critical care time           

      stopped, patient has stabilized. Patient states symptoms have not improved.                 

                                                                                                  

Vital Signs:                                                                                      

13:15  / 98; Pulse 88; Resp 15; Temp 98.9(O); Pulse Ox 97% on R/A; Weight 131.54 kg     lk1 

      (R); Height 4 ft. 11 in. (149.86 cm) (R); Pain 10/10;                                       

14:00  / 93; Pulse 85; Resp 16; Pulse Ox 95% on R/A;                                    lk1 

14:30  / 97; Pulse 80; Resp 16; Pulse Ox 97% on R/A;                                    lk1 

15:00  / 107; Pulse 80; Resp 15; Pulse Ox 96% on R/A; Pain 10/10;                       lk1 

16:00  / 92; Pulse 82; Resp 15; Pulse Ox 100% on R/A; Pain 8/10;                        lk1 

13:15 Body Mass Index 58.57 (131.54 kg, 149.86 cm)                                            lk1 

                                                                                                  

ED Course:                                                                                        

13:15 Patient arrived in ED.                                                                  lk1 

13:22 Triage completed.                                                                       lk1 

13:26 Luis Ervin PA is PHCP.                                                                cp  

13:26 Olvin More MD is Attending Physician.                                             cp  

13:26 Arm band placed on right wrist.                                                         lk1 

13:26 Patient has correct armband on for positive identification. Bed in low position. Call   lk1 

      light in reach.                                                                             

13:33 Tina Pabon, RN is Primary Nurse.                                                       lk1 

13:45 Accessed PICC line. using per hospital protocol. Clean \T\ dry. Dressing intact. Good     lk1

      blood return. Flushes easily.                                                               

14:06 Head Brain Wo Cont In Process Unspecified.                                              EDMS

14:09 CT completed. Patient tolerated procedure well. Patient moved back from CT.             vr  

15:35 Initial lab(s) drawn, by me, sent to lab. Inserted saline lock: 20 gauge in right       iw  

      antecubital area, using aseptic technique. Blood collected.                                 

16:36 No provider procedures requiring assistance completed. Patient transferred, IV remains  lk1 

      in place. No redness/swelling at site.                                                      

                                                                                                  

Administered Medications:                                                                         

13:52 Drug: Demerol 25 mg Route: IVP; Site: PICC;                                             lk1 

14:15 Follow up: Response: No adverse reaction; Marked relief of symptoms                     lk1 

13:53 Drug: Benadryl 25 mg Route: IVP; Site: PICC;                                            lk1 

14:15 Follow up: Response: No adverse reaction; Marked relief of symptoms                     lk1 

13:55 Drug: Phenergan 25 mg Route: IVP; Site: PICC;                                           lk1 

14:15 Follow up: Response: No adverse reaction; Marked relief of symptoms                     lk1 

13:56 CANCELLED (Physician Discretion): Benadryl 25 mg IM once                                lk1 

13:56 CANCELLED (Physician Discretion): Demerol 25 mg IM once                                 lk1 

13:57 CANCELLED (Physician Discretion): Phenergan 25 mg IM once                               lk1 

15:08 Drug: Decadron - Dexamethasone 10 mg Route: IVP; Site: PICC;                            lk1 

15:30 Follow up: Response: No adverse reaction                                                lk1 

15:08 Drug: Demerol 25 mg Route: IVP; Site: PICC;                                             lk 

15:30 Follow up: Response: No adverse reaction; Pain is decreased                             lk1 

16:35 Not Given (Patient Refused): NS 0.9% 1000 ml IV at 100 ml/hr continuous                 lk1 

                                                                                                  

                                                                                                  

Outcome:                                                                                          

14:59 ER care complete, transfer ordered by MD. arshad  

16:36 Transferred by ground EMS to [facility], Transfer form completed.             lk1 

16:36 Condition: good                                                                             

16:36 Discharge instructions given to patient, Instructed on the need for transfer,               

      Demonstrated understanding of instructions.                                                 

16:36 Patient left the ED.                                                                    lk1 

                                                                                                  

Signatures:                                                                                       

Dispatcher [employer]                           EDRenetta Tinsley RN RN iw Davis, Victoria vr Page, Corey, PA PA cp Kluge, Leah RN                         RN   lk1                                                  

                                                                                                  

Corrections: (The following items were deleted from the chart)                                    

15:13 13:08 Demerol 25 mg IVP in PICC lk1                                                     lk1 

15:14 13:10 Decadron - Dexamethasone 10 mg IVP in PICC lk1                                    lk1 

                                                                                                  

**************************************************************************************************

## 2018-05-03 NOTE — XMS REPORT
Clinical Summary

 Created on:May 3, 2018



Patient:Redd Dale

Sex:Male

:1985

External Reference #:[id number]





Demographics







 Address  [address]



   [place] [address]

 

 Home Phone  [phone]

 

 Preferred Language  English

 

 Marital Status  Unknown

 

 [employer] Affiliation  Unknown

 

 Race  Black or 

 

 Ethnic Group  Not  or 









Author







 Organization  [facility]

 

 Address  [address] [place] [address]

 

 Phone  [phone]









Support







 Name  Relationship  Address  Phone

 

 Unavailable  Unavailable  [address]  [phone]



     [place] [address]  









Care Team Providers







 Name  Role  Phone

 

 Unavailable  Primary Care Provider  Unavailable









Allergies







 Active Allergy  Reactions  Severity  Noted Date  Comments

 

 Sulfamethoxazole-Trimethoprim  Hives  High  2017  

 

 Levofloxacin  Hives  High  2017  

 

 Morphine  Hives  High  2017  

 

 Ketorolac  Rash  High  2017  

 

 Vancomycin Analogues  Rash  High  2017  

 

 Sesame Seed  Hives    2017  

 

 Ondansetron Hcl (Pf)  Nausea And Vomiting    2017  







Current Medications







 Prescription  Sig.  Disp.  Refills  Start Date  End Date  Status

 

 oxyCODONE-acetaminoph  Take 1 tablet          Active



 en (PERCOCET)   by mouth every          



 mg per tablet  4 (four) hours          



   as needed for          



   Pain.          

 

 cefdinir (OMNICEF)  Take 1 capsule  26 capsule  0  2017  




 300 MG capsule  (300 mg total)          



   by mouth every          



   12 (twelve)          



   hours for 13          



   days.          







Active Problems







 Problem  Noted Date

 

 Spina bifida (HCC)  2017

 

 Pyelonephritis  2017







Encounters







 Date  Type  Specialty  Care Team  Description

 

 06/10/2017 -  Hospital Encounter  General Internal  Cesar, Chimkama  
Pyelonephritis;Sepsis



 2017    Medicine  MD Jennifer



  , due to unspecified



       Walls, Jose Martin  organism ([facility]);Sierra Tejada MD



  bifida, unspecified



       Josefa Hardwick  hydrocephalus



       MD Kiara  presence, unspecified



         spinal region ([facility])



after 2017



Social History







 Tobacco Use  Types  Packs/Day  Years Used  Date

 

 Current Every Day Smoker  Cigarettes  0.5    









 Tobacco Cessation: Ready to Quit: No









 Sex Assigned at Birth  Date Recorded

 

 Not on file  







Last Filed Vital Signs







 Vital Sign  Reading  Time Taken

 

 Blood Pressure  128/66  2017 11:18 AM CDT

 

 Pulse  89  2017 11:18 AM CDT

 

 Temperature  36.6 C (97.8 F)  2017 11:18 AM CDT

 

 Respiratory Rate  19  2017 11:18 AM CDT

 

 Oxygen Saturation  95%  2017  3:43 AM CDT

 

 Inhaled Oxygen Concentration  -  -

 

 Weight  131.1 kg (289 lb)  2017  1:00 AM CDT

 

 Height  149.9 cm (4' 11")  2017  1:00 AM CDT

 

 Body Mass Index  58.37  2017  1:00 AM CDT







Plan of Treatment

Not on file



Results

RHYTHM STRIP - SCAN (2017 10:10 AM)Manual Differential (2017  9:58 
AM)Only the most recent of2 resultswithin the time period is included.





 Component  Value  Ref Range

 

 Total Counted    

 

 WBC Morphology  Normal  

 

 Platelet Morphology  Normal  

 

 RBC Morphology  Normal  









 Specimen  Performing Laboratory

 

 Blood - Arm, [address] [address]



CBC with platelet count + automated diff (2017  9:58 AM)Only the most 
recent of3 resultswithin the time period is included.





 Component  Value  Ref Range

 

 WBC  7.3  4.0 - 10.0 K/L

 

 RBC  5.09  4.20 - 5.80 M/L

 

 Hemoglobin  13.0  13.0 - 16.8 GM/DL

 

 Hematocrit  42.3  40.0 - 50.0 %

 

 MCV  83.0  82.0 - 98.0 fL

 

 MCH  25.6 (L)  27.0 - 33.0 pg

 

 MCHC  30.8 (L)  32.0 - 36.0 GM/DL

 

 RDW  18.0 (H)  10.3 - 14.2 %

 

 Platelets  331  150 - 430 K/CU MM

 

 MPV  8.5  6.5 - 10.5 fL

 

 nRBC  0  0 - 0 /100 WBC

 

 % Neutros  65  %

 

 % Lymphs  23  %

 

 % Monos  8  %

 

 % Eos  3  %

 

 % Baso  1  %

 

 # Neutros  4.75  1.80 - 8.00 K/L

 

 # Lymphs  1.68  1.48 - 4.50 K/L

 

 # Monos  0.55  0.00 - 1.30 K/L

 

 # Eos  0.24  0.00 - 0.50 K/L

 

 # Baso  0.05  0.00 - 0.20 K/L









 Specimen  Performing Laboratory

 

 Blood - Arm, [address] [address]









 Narrative

 

 0.00







 0.52







 0.00







 0.00







 0.00







 0.00



CBC with platelet count + automated diff (2017  9:58 AM)Only the most 
recent of3 resultswithin the time period is included.





 Specimen  Performing Laboratory

 

 Blood  









 Narrative

 

 The following orders were created for panel order CBC with platelet count + 
automated



 diff.







 Procedure



 Abnormality Status 







 ---------



 ----------- ------ 







 CBC with platelet count ...[[id number]]AbnormalFinal



 result 







 Manual



 Differential[[id number]]
Fi



 nal result 







  







 Please view results for these tests on the individual orders.



Basic Metabolic Panel (2017  9:58 AM)Only the most recent of2 
resultswithin the time period is included.





 Component  Value  Ref Range

 

 Sodium  138  136 - 145 meq/L

 

 Potassium  4.0  3.5 - 5.1 meq/L

 

 Chloride  108 (H)  98 - 107 meq/L

 

 CO2  23  22 - 29 meq/L

 

 BUN  11  7 - 21 mg/dL

 

 Creatinine  0.74  0.57 - 1.25 mg/dL

 

 Glucose  124 (H)  70 - 105 mg/dL

 

 Calcium  8.9  8.4 - 10.2 mg/dL

 

 EGFR  150Comment: ESTIMATED GFR IS NOT AS ACCURATE AS  mL/min/1.73 sq m



   CREATININE CLEARANCE IN PREDICTING GLOMERULAR FILTRATION  



   RATE. ESTIMATED GFR IS NOT APPLICABLE FOR DIALYSIS  



   PATIENTS.  









 Specimen  Performing Laboratory

 

 Blood - Arm, [address] [address]



Comprehensive metabolic panel (2017  8:09 AM)





 Component  Value  Ref Range

 

 Protein, Total  7.3  6.0 - 8.3 gm/dL

 

 Albumin  3.3 (L)  3.5 - 5.0 g/dL

 

 Alkaline Phosphatase  89  40 - 150 U/L

 

 Total Bilirubin  1.4 (H)  0.2 - 1.2 mg/dL

 

 Sodium  137  136 - 145 meq/L

 

 Potassium  3.7  3.5 - 5.1 meq/L

 

 Chloride  108 (H)  98 - 107 meq/L

 

 CO2  17 (L)  22 - 29 meq/L

 

 BUN  12  7 - 21 mg/dL

 

 Creatinine  0.78  0.57 - 1.25 mg/dL

 

 Glucose  119 (H)  70 - 105 mg/dL

 

 Calcium  8.6  8.4 - 10.2 mg/dL

 

 AST  13  5 - 34 U/L

 

 ALT  28  6 - 55 U/L

 

 EGFR  141Comment: ESTIMATED GFR IS NOT AS ACCURATE  mL/min/1.73 sq m



   AS CREATININE CLEARANCE IN PREDICTING  



   GLOMERULAR FILTRATION RATE. ESTIMATED GFR IS  



   NOT APPLICABLE FOR DIALYSIS PATIENTS.  









 Specimen  Performing Laboratory

 

 Blood - Arm, [address] [address]



Urinalysis w/ Microscopic (2017  4:57 AM)





 Component  Value  Ref Range

 

 Color, UA  Yellow  

 

 Clarity, UA  Hazy  

 

 Specific Gravity, UA  1.012  1.001 - 1.035

 

 pH, UA  5.5  5.0 - 8.0

 

 Protein, UA  100 mg/dL (A)  Negative

 

 Glucose, UA  Negative  Negative

 

 Ketones, UA  Negative  Negative

 

 Bilirubin, UA  Negative  Negative

 

 Blood, UA  Moderate (A)  Negative

 

 Nitrite, UA  Negative  Negative

 

 Leukocytes, UA  Large (A)  Negative

 

 Urobilinogen, UA  3.0 (H)  0.2 - 1.0 mg/dL

 

 RBC, UA  19  /HPF

 

 WBC, UA  182  /HPF

 

 Specimen Source    









 Specimen  Performing Laboratory

 

 Urine  [address] [address]



Urine culture (2017  4:57 AM)





 Component  Value  Ref Range

 

 Result  <10,000 col/mL skin jaime  









 Specimen  Performing Laboratory

 

 Urine - Urine, Urostomy  [address] [address]



Blood culture #1 (2017  3:26 AM)Only the most recent of2 resultswithin 
the time period is included.





 Component  Value  Ref Range

 

 Result  No growth in 5 days  









 Specimen  Performing Laboratory

 

 Blood - Arm, [address] [address]



after 2017

## 2018-05-03 NOTE — XMS REPORT
Patient Summary Document

 Created on:May 3, 2018



Patient:JORDAN VERDIN

Sex:Male

:1985

External Reference #:[id number]





Demographics







 Address  [address]



   [place] [address]

 

 Home Phone  [phone]

 

 Work Phone  [phone]

 

 Email Address  [phone]

 

 Preferred Language  Unknown

 

 Marital Status  Unknown

 

 Religion Affiliation  Unknown

 

 Race  Unknown

 

 Additional Race(s)  Unavailable

 

 Ethnic Group  Unknown









Author







 Organization  [facility]

 

 Address  [address] [place] [address] [address]



   [place] [address]

 

 Phone  [phone]









Care Team Providers







 Name  Role  Phone

 

 RAMU TORRES  Unavailable  Unavailable









Problems

This patient has no known problems.



Allergies, Adverse Reactions, Alerts

This patient has no known allergies or adverse reactions.



Medications

This patient has no known medications.



Results







 Test Description  Test Time  Test Comments  Text Results  Atomic Results  
Result Comments









 BLOOD CULTURE  2017 16:22:00    









   Test Item  Value  Reference Range  Comments









 CULTURE (BEAKER) (test code=1095)  No growth in 5 days    



BLOOD [id number] 16:22:00





 Test Item  Value  Reference Range  Comments

 

 CULTURE (BEAKER) (test code=1095)  No growth in 5 days    



(MANUAL DIFFERENTIAL)2017 22:29:00





 Test Item  Value  Reference Range  Comments

 

 TOTAL COUNTED (BEAKER) (test code=1351)      

 

 WBC MORPHOLOGY (BEAKER) (test code=487)  Normal    

 

 PLT MORPHOLOGY (BEAKER) (test code=486)  Normal    

 

 RBC MORPHOLOGY (BEAKER) (test code=762)  Normal    



CBC W/PLT COUNT &amp; AUTO [id number] 22:28:00





 Test Item  Value  Reference Range  Comments

 

 WHITE BLOOD CELL COUNT (BEAKER) (test code=775)  7.3 K/ L  4.0-10.0  

 

 RED BLOOD CELL COUNT (BEAKER) (test code=761)  5.09 M/ L  4.20-5.80  

 

 HEMOGLOBIN (BEAKER) (test code=410)  13.0 GM/DL  13.0-16.8  

 

 HEMATOCRIT (BEAKER) (test code=411)  42.3 %  40.0-50.0  

 

 MEAN CORPUSCULAR VOLUME (BEAKER) (test code=753)  83.0 fL  82.0-98.0  

 

 MEAN CORPUSCULAR HEMOGLOBIN (BEAKER) (test  25.6 pg  27.0-33.0  



 code=751)      

 

 MEAN CORPUSCULAR HEMOGLOBIN CONC (BEAKER) (test  30.8 GM/DL  32.0-36.0  



 code=752)      

 

 RED CELL DISTRIBUTION WIDTH (BEAKER) (test  18.0 %  10.3-14.2  



 code=412)      

 

 PLATELET COUNT (BEAKER) (test code=756)  331 K/CU MM  150-430  

 

 MEAN PLATELET VOLUME (BEAKER) (test code=754)  8.5 fL  6.5-10.5  

 

 NUCLEATED RED BLOOD CELLS (BEAKER) (test  0 /100 WBC  0-0  



 code=413)      

 

 NEUTROPHILS RELATIVE PERCENT (BEAKER) (test  65 %    



 code=429)      

 

 LYMPHOCYTES RELATIVE PERCENT (BEAKER) (test  23 %    



 code=430)      

 

 MONOCYTES RELATIVE PERCENT (BEAKER) (test  8 %    



 code=431)      

 

 EOSINOPHILS RELATIVE PERCENT (BEAKER) (test  3 %    



 code=432)      

 

 BASOPHILS RELATIVE PERCENT (BEAKER) (test  1 %    



 code=437)      

 

 NEUTROPHILS ABSOLUTE COUNT (BEAKER) (test  4.75 K/ L  1.80-8.00  



 code=670)      

 

 LYMPHOCYTES ABSOLUTE COUNT (BEAKER) (test  1.68 K/ L  1.48-4.50  



 code=414)      

 

 MONOCYTES ABSOLUTE COUNT (BEAKER) (test  0.55 K/ L  0.00-1.30  



 code=415)      

 

 EOSINOPHILS ABSOLUTE COUNT (BEAKER) (test  0.24 K/ L  0.00-0.50  



 code=416)      

 

 BASOPHILS ABSOLUTE COUNT (BEAKER) (test  0.05 K/ L  0.00-0.20  



 code=417)      



[id number] METABOLIC [id number] 11:11:00





 Test Item  Value  Reference Range  Comments

 

 SODIUM (BEAKER) (test  138 meq/L  136-145  



 npos=652)      

 

 POTASSIUM (BEAKER) (test  4.0 meq/L  3.5-5.1  



 code=379)      

 

 CHLORIDE (BEAKER) (test  108 meq/L    



 code=382)      

 

 CO2 (BEAKER) (test  23 meq/L  22-29  



 code=355)      

 

 BLOOD UREA NITROGEN  11 mg/dL  7-21  



 (BEAKER) (test code=354)      

 

 CREATININE (BEAKER) (test  0.74 mg/dL  0.57-1.25  



 code=358)      

 

 GLUCOSE RANDOM (BEAKER)  124 mg/dL    



 (test code=652)      

 

 CALCIUM (BEAKER) (test  8.9 mg/dL  8.4-10.2  



 code=697)      

 

 EGFR (BEAKER) (test  150 mL/min/1.73 sq m    ESTIMATED GFR IS NOT AS



 code=1092)      ACCURATE AS CREATININE



       CLEARANCE IN PREDICTING



       GLOMERULAR FILTRATION



       RATE. ESTIMATED GFR IS



       NOT APPLICABLE FOR



       DIALYSIS PATIENTS.



URINE [id number] 09:56:00





 Test Item  Value  Reference Range  Comments

 

 CULTURE (BEAKER) (test code=1095)  <10,000 col/mL skin jaime    



COMPREHENSIVE METABOLIC [id number] 08:49:00





 Test Item  Value  Reference Range  Comments

 

 TOTAL PROTEIN (BEAKER)  7.3 gm/dL  6.0-8.3  



 (test code=770)      

 

 ALBUMIN (BEAKER) (test  3.3 g/dL  3.5-5.0  



 code=1145)      

 

 ALKALINE PHOSPHATASE  89 U/L    



 (BEAKER) (test code=346)      

 

 BILIRUBIN TOTAL (BEAKER)  1.4 mg/dL  0.2-1.2  



 (test code=377)      

 

 SODIUM (BEAKER) (test  137 meq/L  136-145  



 vjsy=587)      

 

 POTASSIUM (BEAKER) (test  3.7 meq/L  3.5-5.1  



 code=379)      

 

 CHLORIDE (BEAKER) (test  108 meq/L    



 code=382)      

 

 CO2 (BEAKER) (test  17 meq/L  22-29  



 code=355)      

 

 BLOOD UREA NITROGEN  12 mg/dL  7-21  



 (BEAKER) (test code=354)      

 

 CREATININE (BEAKER) (test  0.78 mg/dL  0.57-1.25  



 code=358)      

 

 GLUCOSE RANDOM (BEAKER)  119 mg/dL    



 (test code=652)      

 

 CALCIUM (BEAKER) (test  8.6 mg/dL  8.4-10.2  



 code=697)      

 

 AST (SGOT) (BEAKER) (test  13 U/L  5-34  



 code=353)      

 

 ALT (SGPT) (BEAKER) (test  28 U/L  6-55  



 code=347)      

 

 EGFR (BEAKER) (test  141 mL/min/1.73 sq    ESTIMATED GFR IS NOT AS



 code=1092)  m    ACCURATE AS CREATININE



       CLEARANCE IN PREDICTING



       GLOMERULAR FILTRATION



       RATE. ESTIMATED GFR IS



       NOT APPLICABLE FOR



       DIALYSIS PATIENTS.



CBC W/PLT COUNT &amp; AUTO [id number] 08:46:00





 Test Item  Value  Reference Range  Comments

 

 WHITE BLOOD CELL COUNT (BEAKER) (test code=775)  9.4 K/ L  4.0-10.0  

 

 RED BLOOD CELL COUNT (BEAKER) (test code=761)  4.79 M/ L  4.20-5.80  

 

 HEMOGLOBIN (BEAKER) (test code=410)  12.8 GM/DL  13.0-16.8  

 

 HEMATOCRIT (BEAKER) (test code=411)  40.0 %  40.0-50.0  

 

 MEAN CORPUSCULAR VOLUME (BEAKER) (test code=753)  83.4 fL  82.0-98.0  

 

 MEAN CORPUSCULAR HEMOGLOBIN (BEAKER) (test  26.7 pg  27.0-33.0  



 code=751)      

 

 MEAN CORPUSCULAR HEMOGLOBIN CONC (BEAKER) (test  32.0 GM/DL  32.0-36.0  



 code=752)      

 

 RED CELL DISTRIBUTION WIDTH (BEAKER) (test  18.2 %  10.3-14.2  



 code=412)      

 

 PLATELET COUNT (BEAKER) (test code=756)  313 K/CU MM  150-430  

 

 MEAN PLATELET VOLUME (BEAKER) (test code=754)  8.6 fL  6.5-10.5  

 

 NUCLEATED RED BLOOD CELLS (BEAKER) (test  0 /100 WBC  0-0  



 code=413)      

 

 NEUTROPHILS RELATIVE PERCENT (BEAKER) (test  70 %    



 code=429)      

 

 LYMPHOCYTES RELATIVE PERCENT (BEAKER) (test  16 %    



 code=430)      

 

 MONOCYTES RELATIVE PERCENT (BEAKER) (test  12 %    



 code=431)      

 

 EOSINOPHILS RELATIVE PERCENT (BEAKER) (test  1 %    



 code=432)      

 

 BASOPHILS RELATIVE PERCENT (BEAKER) (test  1 %    



 code=437)      

 

 NEUTROPHILS ABSOLUTE COUNT (BEAKER) (test  6.54 K/ L  1.80-8.00  



 code=670)      

 

 LYMPHOCYTES ABSOLUTE COUNT (BEAKER) (test  1.50 K/ L  1.48-4.50  



 code=414)      

 

 MONOCYTES ABSOLUTE COUNT (BEAKER) (test  1.13 K/ L  0.00-1.30  



 code=415)      

 

 EOSINOPHILS ABSOLUTE COUNT (BEAKER) (test  0.13 K/ L  0.00-0.50  



 code=416)      

 

 BASOPHILS ABSOLUTE COUNT (BEAKER) (test  0.06 K/ L  0.00-0.20  



 code=417)      



0.00URINALYSIS W/ [id number] 20:36:00





 Test Item  Value  Reference Range  Comments

 

 COLOR (BEAKER) (test code=470)  Yellow    

 

 CLARITY (BEAKER) (test code=469)  Hazy    

 

 SPECIFIC GRAVITY UA (BEAKER) (test code=468)  1.012  1.001-1.035  

 

 PH UA (BEAKER) (test code=467)  5.5  5.0-8.0  

 

 PROTEIN UA (BEAKER) (test code=464)  100 mg/dL  Negative  

 

 GLUCOSE UA (BEAKER) (test code=365)  Negative  Negative  

 

 KETONES UA (BEAKER) (test code=371)  Negative  Negative  

 

 BILIRUBIN UA (BEAKER) (test code=462)  Negative  Negative  

 

 BLOOD UA (BEAKER) (test code=461)  Moderate  Negative  

 

 NITRITE UA (BEAKER) (test code=465)  Negative  Negative  

 

 LEUKOCYTE ESTERASE UA (BEAKER) (test code=466)  Large  Negative  

 

 UROBILINOGEN UA (BEAKER) (test code=463)  3.0 mg/dL  0.2-1.0  

 

 RBC UA (BEAKER) (test code=519)  19 /HPF    

 

 WBC UA (BEAKER) (test code=520)  182 /HPF    

 

 SOURCE(BEAKER) (test code=9479)      



BASIC METABOLIC [id number] 17:00:00





 Test Item  Value  Reference Range  Comments

 

 SODIUM (BEAKER) (test  140 meq/L  136-145  



 obkr=463)      

 

 POTASSIUM (BEAKER) (test  3.7 meq/L  3.5-5.1  



 code=379)      

 

 CHLORIDE (BEAKER) (test  112 meq/L    



 code=382)      

 

 CO2 (BEAKER) (test  17 meq/L  22-29  



 code=355)      

 

 BLOOD UREA NITROGEN  23 mg/dL  7-21  



 (BEAKER) (test code=354)      

 

 CREATININE (BEAKER) (test  1.00 mg/dL  0.57-1.25  



 code=358)      

 

 GLUCOSE RANDOM (BEAKER)  102 mg/dL    



 (test code=652)      

 

 CALCIUM (BEAKER) (test  8.7 mg/dL  8.4-10.2  



 code=697)      

 

 EGFR (BEAKER) (test  106 mL/min/1.73 sq m    ESTIMATED GFR IS NOT AS



 code=1092)      ACCURATE AS CREATININE



       CLEARANCE IN PREDICTING



       GLOMERULAR FILTRATION



       RATE. ESTIMATED GFR IS



       NOT APPLICABLE FOR



       DIALYSIS PATIENTS.



Specimen slightly ictericCBC W/PLT COUNT &amp; AUTO [id number] 12:18
:00





 Test Item  Value  Reference Range  Comments

 

 WHITE BLOOD CELL COUNT (BEAKER) (test code=775)  16.4 K/ L  4.0-10.0  

 

 RED BLOOD CELL COUNT (BEAKER) (test code=761)  5.22 M/ L  4.20-5.80  

 

 HEMOGLOBIN (BEAKER) (test code=410)  14.4 GM/DL  13.0-16.8  

 

 HEMATOCRIT (BEAKER) (test code=411)  42.9 %  40.0-50.0  

 

 MEAN CORPUSCULAR VOLUME (BEAKER) (test code=753)  82.2 fL  82.0-98.0  

 

 MEAN CORPUSCULAR HEMOGLOBIN (BEAKER) (test  27.5 pg  27.0-33.0  



 code=751)      

 

 MEAN CORPUSCULAR HEMOGLOBIN CONC (BEAKER) (test  33.5 GM/DL  32.0-36.0  



 code=752)      

 

 RED CELL DISTRIBUTION WIDTH (BEAKER) (test  16.2 %  10.3-14.2  



 code=412)      

 

 PLATELET COUNT (BEAKER) (test code=756)  329 K/CU MM  150-430  

 

 MEAN PLATELET VOLUME (BEAKER) (test code=754)  8.2 fL  6.5-10.5  

 

 NUCLEATED RED BLOOD CELLS (BEAKER) (test  0 /100 WBC  0-0  



 code=413)      

 

 NEUTROPHILS RELATIVE PERCENT (BEAKER) (test  83 %    



 code=429)      

 

 LYMPHOCYTES RELATIVE PERCENT (BEAKER) (test  7 %    



 code=430)      

 

 MONOCYTES RELATIVE PERCENT (BEAKER) (test  9 %    



 code=431)      

 

 EOSINOPHILS RELATIVE PERCENT (BEAKER) (test  0 %    



 code=432)      

 

 BASOPHILS RELATIVE PERCENT (BEAKER) (test  0 %    



 code=437)      

 

 NEUTROPHILS ABSOLUTE COUNT (BEAKER) (test  1.62 K/ L  1.80-8.00  



 code=670)      

 

 LYMPHOCYTES ABSOLUTE COUNT (BEAKER) (test  1.15 K/ L  1.48-4.50  



 code=414)      

 

 MONOCYTES ABSOLUTE COUNT (BEAKER) (test  1.56 K/ L  0.00-1.30  



 code=415)      

 

 EOSINOPHILS ABSOLUTE COUNT (BEAKER) (test  0.01 K/ L  0.00-0.50  



 code=416)      

 

 BASOPHILS ABSOLUTE COUNT (BEAKER) (test  0.02 K/ L  0.00-0.20  



 code=417)      



(MANUAL DIFFERENTIAL)2017 12:18:00





 Test Item  Value  Reference Range  Comments

 

 TOTAL COUNTED (BEAKER) (test code=1351)      

 

 WBC MORPHOLOGY (BEAKER) (test code=487)  Normal    

 

 PLT MORPHOLOGY (BEAKER) (test code=486)  Normal    

 

 RBC MORPHOLOGY (BEAKER) (test code=762)  Normal

## 2018-05-03 NOTE — EDPHYS
Physician Documentation                                                                           

 [facility]                                                                

Name: Redd Dale Jr                                                                            

Age: 32 yrs                                                                                       

Sex: Male                                                                                         

: 1985                                                                                   

MRN: [id number]                                                                                   

Arrival Date: 2018                                                                          

Time: 13:15                                                                                       

Account#: [id number]                                                                            

Bed 30                                                                                            

Private MD:                                                                                       

ED Physician Olvin More                                                                      

HPI:                                                                                              

                                                                                             

13:37 This 32 yrs old Black Male presents to ER via EMS with complaints of headache.          cp  

13:37 The patient complains of pain to the top of head and forehead.                          cp  

13:37 The patient describes the headache as constant, a pressure. Onset: The symptoms/episode cp  

      began/occurred 2 day(s) ago. Associated signs and symptoms: Pertinent positives:            

      nausea, blurred vision, vomiting, Pertinent negatives: altered mental status, fever,        

      weakness. Severity of symptoms: in the emergency department the pain is unchanged,          

      despite home interventions.                                                                 

                                                                                                  

Historical:                                                                                       

- Allergies:                                                                                      

13:23 Amoxicillin;                                                                            lk1 

13:23 Bactrim;                                                                                lk1 

13:23 Ciprofloxacin;                                                                          lk1 

13:23 CLAVULANIC ACID;                                                                        lk1 

13:23 Doxycycline;                                                                            lk1 

13:23 Levofloxacin;                                                                           lk1 

13:23 Morphine;                                                                               lk1 

13:23 sulfamethoxazole;                                                                       lk1 

13:23 Toradol;                                                                                lk1 

13:23 TRIMETHOPRIM;                                                                           lk1 

13:23 Vancomycin;                                                                             lk1 

13:23 Zofran;                                                                                 lk1 

- PMHx:                                                                                           

13:23 Asthma; Cerebral Palsy; cluster headaches; decubitus ulcers on feet; GERD;              lk1 

      Hydrocephalus; Hypertension; spina bifida;                                                  

- PSHx:                                                                                           

13:23  shunt; Cholecystectomy;                                                              lk1 

                                                                                                  

- Immunization history:: Adult Immunizations up to date.                                          

- Social history:: Smoking status: Patient/guardian denies using tobacco.                         

                                                                                                  

                                                                                                  

ROS:                                                                                              

13:40 Constitutional: Negative for body aches, chills, fever, poor PO intake.                 cp  

13:40 Eyes: Positive for blurry vision, Negative for discharge, redness, vision loss.         cp  

13:40 ENT: Negative for drainage from ear(s), ear pain, rhinorrhea, sore throat, difficulty       

      swallowing, difficulty handling secretions.                                                 

13:40 Cardiovascular: Negative for chest pain, edema, palpitations.                               

13:40 Respiratory: Negative for cough, shortness of breath, wheezing.                             

13:40 Abdomen/GI: Positive for nausea and vomiting, Negative for abdominal pain, diarrhea,        

      constipation.                                                                               

13:40 Skin: Negative for cellulitis, rash.                                                        

13:40 Neuro: Positive for headache, Negative for altered mental status, weakness.                 

13:40 All other systems are negative.                                                             

                                                                                                  

Exam:                                                                                             

13:48 Constitutional: The patient appears in no acute distress, alert, awake,                 cp  

      non-diaphoretic, non-toxic, well developed, well nourished, obese.                          

13:48 Head/Face:  Normocephalic, atraumatic. Eyes:  Pupils equal round and reactive to light, cp  

      extra-ocular motions intact.  Lids and lashes normal.  Conjunctiva and sclera are           

      non-icteric and not injected.  Cornea within normal limits.  Periorbital areas with no      

      swelling, redness, or edema. ENT:  Nares patent. No nasal discharge, no septal              

      abnormalities noted.  Tympanic membranes are normal and external auditory canals are        

      clear.  Oropharynx with no redness, swelling, or masses, exudates, or evidence of           

      obstruction, uvula midline.  Mucous membranes moist. Neck:  Trachea midline, no             

      thyromegaly or masses palpated, and no cervical lymphadenopathy.  Supple, full range of     

      motion without nuchal rigidity, or vertebral point tenderness.  No Meningismus.             

      Chest/axilla:  Normal chest wall appearance and motion.  Nontender with no deformity.       

      No lesions are appreciated.                                                                 

13:48 Cardiovascular: Rate: normal, Rhythm: regular.                                              

13:48 Respiratory: the patient does not display signs of respiratory distress,  Respirations:     

      normal, no use of accessory muscles, no retractions, no splinting, no tachypnea,            

      labored breathing, is not present, Breath sounds: are clear throughout, no decreased        

      breath sounds, no stridor, no wheezing.                                                     

13:48 Abdomen/GI: Inspection: obese Palpation: abdomen is soft and non-tender.                    

13:48 Neuro: Orientation: to person, place \T\ time. Mentation: lucid, able to follow commands,   

      Cerebellar function: Romberg testing is negative, normal finger to nose testing,            

      Sensation: no obvious gross deficits, Gait: unable to assess, the patient suffers from      

      cerebal palsy.                                                                              

                                                                                                  

Vital Signs:                                                                                      

13:15  / 98; Pulse 88; Resp 15; Temp 98.9(O); Pulse Ox 97% on R/A; Weight 131.54 kg     lk1 

      (R); Height 4 ft. 11 in. (149.86 cm) (R); Pain 10/10;                                       

14:00  / 93; Pulse 85; Resp 16; Pulse Ox 95% on R/A;                                    lk1 

14:30  / 97; Pulse 80; Resp 16; Pulse Ox 97% on R/A;                                    lk1 

15:00  / 107; Pulse 80; Resp 15; Pulse Ox 96% on R/A; Pain 10/10;                       lk1 

16:00  / 92; Pulse 82; Resp 15; Pulse Ox 100% on R/A; Pain 8/10;                        lk1 

13:15 Body Mass Index 58.57 (131.54 kg, 149.86 cm)                                            lk1 

                                                                                                  

MDM:                                                                                              

13:26 Patient medically screened.                                                             cp  

14:00 Differential diagnosis: hypertensive headache, meningoencephalitis, migraine, neoplasm, cp  

      sinusitis, subarachnoid bleed, tension headache.                                            

14:30 Data reviewed: vital signs, nurses notes, radiologic studies, CT scan.                  cp  

15:05 Physician consultation: DR Siddiqi, neurosurgeon \[facility], requests transfer to              

      hospitalist.                                                                                

15:09 Physician consultation: DR Randhawa, hospitalist \[facility], will accept patient as     

      transfer.                                                                                   

15:28 ED course: VSS. Patient requesting transfer to [facility] where primary             

      neurologist is located. Consult made with DR Kuhn, neurosurgery resident who approves      

      transfer to facility under care of DR Willis.                                                 

                                                                                                  

                                                                                             

14:30 Order name: CBC with Diff; Complete Time: 16:24                                         cp  

                                                                                             

16:24 Interpretation: Normal except: RBC 5.83; ; RDW 17.1.                             cp  

                                                                                             

13:45 Order name: Head Brain Wo Cont; Complete Time: 14:24                                    EDMS

                                                                                             

14:30 Order name: Ptt, Activated; Complete Time: 16:24                                        cp  

                                                                                             

14:30 Order name: PT-INR; Complete Time: 16:24                                                cp  

                                                                                                  

Administered Medications:                                                                         

13:52 Drug: Demerol 25 mg Route: IVP; Site: PICC;                                             lk1 

14:15 Follow up: Response: No adverse reaction; Marked relief of symptoms                     lk1 

13:53 Drug: Benadryl 25 mg Route: IVP; Site: PICC;                                            lk1 

14:15 Follow up: Response: No adverse reaction; Marked relief of symptoms                     lk1 

13:55 Drug: Phenergan 25 mg Route: IVP; Site: PICC;                                           lk1 

14:15 Follow up: Response: No adverse reaction; Marked relief of symptoms                     lk1 

13:56 CANCELLED (Physician Discretion): Benadryl 25 mg IM once                                lk1 

13:56 CANCELLED (Physician Discretion): Demerol 25 mg IM once                                 lk1 

13:57 CANCELLED (Physician Discretion): Phenergan 25 mg IM once                               lk1 

15:08 Drug: Decadron - Dexamethasone 10 mg Route: IVP; Site: PICC;                            lk1 

15:30 Follow up: Response: No adverse reaction                                                lk1 

15:08 Drug: Demerol 25 mg Route: IVP; Site: PICC;                                             lk1 

15:30 Follow up: Response: No adverse reaction; Pain is decreased                             lk1 

16:35 Not Given (Patient Refused): NS 0.9% 1000 ml IV at 100 ml/hr continuous                 lk1 

                                                                                                  

                                                                                                  

Disposition:                                                                                      

17:00 Chart complete.                                                                           

                                                                                             

07:10 Co-signature as Attending Physician, Olvin More MD I agree with the assessment and  wa  

      plan of care.                                                                               

                                                                                                  

Disposition:                                                                                      

18 14:59 Transfer ordered to [facility]. Diagnosis are           

  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                      

  Headache, Nausea and vomiting.                                                                  

- Reason for transfer: Higher level of care.                                                      

- Accepting physician is DR Willis.                                                                 

- Condition is Stable.                                                                            

- Problem is new.                                                                                 

- Symptoms have improved.                                                                         

                                                                                                  

                                                                                                  

                                                                                                  

Signatures:                                                                                       

Dispatcher [employer]                           EDMS                                                 

Luis Ervin PA PA cp Kluge, Leah, RN                         RN   lk1                                                  

Olvin More MD MD   wa                                                   

                                                                                                  

Corrections: (The following items were deleted from the chart)                                    

                                                                                             

13:55 13:44 Head Brain Wo Cont+CT.RAD.BRZ ordered. EDMS                                       EDMS

13:56 13:40 Benadryl 25 mg IM once ordered. cp                                                lk1 

13:56 13:40 Demerol 25 mg IM once ordered. cp                                                 lk1 

13:57 13:40 Phenergan 25 mg IM once ordered. cp                                               lk1 

15:10 14:59 2018 14:59 Transfer ordered to [facility]. Diagnosis is cp  

      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        

      Nausea and vomiting. Reason for transfer: Higher level of care. Accepting physician is      

      [facility]. Condition is Stable. Problem is new. Symptoms have improved. cp                   

15:51 15:10 2018 14:59 Transfer ordered to [facility]. Diagnosis is cp  

      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        

      Nausea and vomiting. Reason for transfer: Higher level of care. Accepting physician is      

      DR Randhawa, hospitalist. Condition is Stable. Problem is new. Symptoms have             

      improved. cp                                                                                

16:21 14:46 BASIC METABOLIC PANEL+C.LAB.BRZ ordered. EDMS                                     EDMS

16:36 15:51 2018 14:59 Transfer ordered to [facility].       lk1 

      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     

      Headache; Nausea and vomiting. Reason for transfer: Higher level of care. Accepting         

      physician is DR Willis. Condition is Stable. Problem is new. Symptoms have improved. cp       

                                                                                                  

**************************************************************************************************

## 2018-05-15 ENCOUNTER — HOSPITAL ENCOUNTER (EMERGENCY)
Dept: HOSPITAL 97 - ER | Age: 33
Discharge: HOME | End: 2018-05-15
Payer: COMMERCIAL

## 2018-05-15 VITALS — TEMPERATURE: 99.1 F

## 2018-05-15 VITALS — OXYGEN SATURATION: 97 % | SYSTOLIC BLOOD PRESSURE: 121 MMHG | DIASTOLIC BLOOD PRESSURE: 93 MMHG

## 2018-05-15 DIAGNOSIS — E86.0: Primary | ICD-10-CM

## 2018-05-15 DIAGNOSIS — I10: ICD-10-CM

## 2018-05-15 DIAGNOSIS — F17.210: ICD-10-CM

## 2018-05-15 LAB
BUN BLD-MCNC: 14 MG/DL (ref 6–20)
GLUCOSE SERPLBLD-MCNC: 92 MG/DL (ref 65–120)
HCT VFR BLD CALC: 49.5 % (ref 39.6–49)
LYMPHOCYTES # SPEC AUTO: 1.6 K/UL (ref 0.7–4.9)
MCH RBC QN AUTO: 28 PG (ref 27–35)
MCV RBC: 83 FL (ref 80–100)
MORPHOLOGY BLD-IMP: (no result)
PMV BLD: 9.1 FL (ref 7.6–11.3)
POTASSIUM SERPL-SCNC: 3.9 MEQ/L (ref 3.6–5)
RBC # BLD: 5.96 M/UL (ref 4.33–5.43)

## 2018-05-15 PROCEDURE — 71275 CT ANGIOGRAPHY CHEST: CPT

## 2018-05-15 PROCEDURE — 85025 COMPLETE CBC W/AUTO DIFF WBC: CPT

## 2018-05-15 PROCEDURE — 05HD33Z INSERTION OF INFUSION DEVICE INTO RIGHT CEPHALIC VEIN, PERCUTANEOUS APPROACH: ICD-10-PCS

## 2018-05-15 PROCEDURE — 71045 X-RAY EXAM CHEST 1 VIEW: CPT

## 2018-05-15 PROCEDURE — 84484 ASSAY OF TROPONIN QUANT: CPT

## 2018-05-15 PROCEDURE — 80048 BASIC METABOLIC PNL TOTAL CA: CPT

## 2018-05-15 PROCEDURE — 99285 EMERGENCY DEPT VISIT HI MDM: CPT

## 2018-05-15 PROCEDURE — 36415 COLL VENOUS BLD VENIPUNCTURE: CPT

## 2018-05-15 PROCEDURE — 93005 ELECTROCARDIOGRAM TRACING: CPT

## 2018-05-15 NOTE — RAD REPORT
EXAM DESCRIPTION:  RAD - Chest Single View - 5/15/2018 7:41 pm

 

CLINICAL HISTORY:  Chest pain.

 

COMPARISON:  03/24/2018

 

FINDINGS:  Portable technique limits examination quality.

 

The lungs are grossly clear. The heart is normal in size. No displaced fractures.Shunt tubing haritha
es the chest.

 

IMPRESSION:  No acute intrathoracic process suspected.

## 2018-05-15 NOTE — EDPHYS
Physician Documentation                                                                           

 [facility]                                                                

Name: Redd Dale Jr                                                                            

Age: 32 yrs                                                                                       

Sex: Male                                                                                         

: 1985                                                                                   

MRN: [id number]                                                                                   

Arrival Date: 05/15/2018                                                                          

Time: 18:10                                                                                       

Account#: [id number]                                                                            

Bed 25                                                                                            

Private MD:                                                                                       

ED Physician Anuel Berry                                                                         

HPI:                                                                                              

05/15                                                                                             

18:50 This 32 yrs old Black Male presents to ER via EMS with complaints of Chest Pain.        rn  

18:50 The patient or guardian reports chest pain that is located primarily in the anterior    rn  

      chest wall. The pain does not radiate. The chest pain is described as sharp, stabbing.      

      Duration: The patient or guardian reports a single episode, that is still ongoing, that     

      lasted 10 hour(s). Severity of pain: At its worst the pain was moderate in the              

      emergency department the pain is unchanged. The patient has not experienced similar         

      symptoms in the past. Reports anterior mid sternal chest pain, non-radiating, constant      

      since this AM, moderate, states fentanyl didn't help, + nausea, no trauma. No hx of         

      dvt/PE.                                                                                     

                                                                                                  

Historical:                                                                                       

- PMHx:                                                                                           

18:22 Asthma; Cerebral Palsy; cluster headaches; decubitus ulcers on feet; GERD;              mb3 

      Hydrocephalus; Hypertension; spina bifida;                                                  

                                                                                                  

- Immunization history:: Adult Immunizations up to date.                                          

- Social history:: Smoking status: Patient uses tobacco products, smokes one pack                 

  cigarettes per day.                                                                             

- Family history:: not pertinent.                                                                 

- Hospitalizations: : No recent hospitalization is reported.                                      

                                                                                                  

                                                                                                  

ROS:                                                                                              

18:50 Constitutional: Negative for fever, chills, and weight loss, Eyes: Negative for injury, rn  

      pain, redness, and discharge, ENT: Negative for injury, pain, and discharge, Neck:          

      Negative for injury, pain, and swelling, Cardiovascular: Negative for palpitations, and     

      edema, Respiratory: Negative for wheezing Abdomen/GI: Negative for abdominal pain,          

      nausea, vomiting, diarrhea, and constipation, Neuro: + mild headache                        

                                                                                                  

Exam:                                                                                             

18:50 Constitutional:  This is a well developed, well nourished patient who is awake, alert,  rn  

      and in no acute distress. Head/Face:  Normocephalic, atraumatic. Eyes:  Pupils equal        

      round and reactive to light, extra-ocular motions intact.  Lids and lashes normal.          

      Conjunctiva and sclera are non-icteric and not injected.  Cornea within normal limits.      

      Periorbital areas with no swelling, redness, or edema. ENT:  dry MM, no stridor             

      Cardiovascular:  tachycardic, regular, no murmur Respiratory:  + mild tachypnea, no         

      retractions, no wheezing Abdomen/GI:  Soft, non-tender, with normal bowel sounds.  No       

      distension or tympany.  No guarding or rebound.  No evidence of tenderness throughout.      

      Neuro:  Awake and alert, GCS 15, oriented to person, place, time, and situation.            

20:49 ECG was reviewed by the Attending Physician.                                            rn  

                                                                                                  

Vital Signs:                                                                                      

18:26  / 77; Pulse 122; Resp 20; Temp 99.1(O); Pulse Ox 96% on R/A; Weight 131.54 kg;   mb3 

      Height 4 ft. 11 in. (149.86 cm); Pain 10/10;                                                

20:00  / 72; Pulse 119; Resp 18; Pulse Ox 98% on R/A;                                   mb3 

21:49  / 93; Pulse 107; Resp 18; Pulse Ox 97% ;                                         mb3 

18:26 Body Mass Index 58.57 (131.54 kg, 149.86 cm)                                            mb3 

                                                                                                  

Procedures:                                                                                       

20:04 Peripheral line: by aseptic technique a peripheral line was placed in the right         rn  

      antecubital vein, Placed by Dr. Berry using u/s guidance.                                   

                                                                                                  

MDM:                                                                                              

18:40 Patient medically screened.                                                             rn  

21:13 Differential diagnosis: acute pericarditis, costochondritis, pleurisy, pneumonia,       rn  

      pneumothorax, pulmonary embolus. Data reviewed: vital signs, nurses notes, lab test         

      result(s), EKG, radiologic studies, CT scan, plain films, and as a result, I will           

      discharge patient. Counseling: I had a detailed discussion with the patient and/or          

      guardian regarding: the historical points, exam findings, and any diagnostic results        

      supporting the discharge/admit diagnosis, lab results, radiology results, the need for      

      outpatient follow up, to return to the emergency department if symptoms worsen or           

      persist or if there are any questions or concerns that arise at home. Response to           

      treatment: the patient's symptoms have markedly improved after treatment, and as a          

      result, I will discharge patient. Special discussion: I discussed with the                  

      patient/guardian in detail that at this point there is no indication for admission to       

      the hospital. It is understood, however, that if the symptoms persist or worsen the         

      patient needs to return immediately for re-evaluation. ED course: Pt feels better, ct       

      PE neg, trop neg, elevated WBC, is being treated for UTI currently, he states also          

      finishing up abx for skin infection that he states is getting better, reports taking        

      "like 8 different abx including zosyn", will dc home with pcp f/u and return                

      precautions. .                                                                              

21:15 ED course: Pt sitting up in bed, on phone, no discomfort, BP normal, HR down to 90s,    rn  

      will dc home as already on multiple abx, source of chest pain not identified, +             

      reproducible on palpation, neg ct PE..                                                      

                                                                                                  

05/15                                                                                             

18:23 Order name: Troponin (emerg Dept Use Only); Complete Time: 20:33                        snw 

05/15                                                                                             

18:45 Order name: CBC with Diff; Complete Time: 21:04                                         rn  

05/15                                                                                             

18:23 Order name: Chest Single View XRAY; Complete Time: 20:26                                snw 

05/15                                                                                             

18:45 Order name: Basic Metabolic Panel; Complete Time: 20:38                                 rn  

05/15                                                                                             

20:26 Order name: Manual Differential; Complete Time: 21:04                                   EDMS

05/15                                                                                             

18:47 Order name: CT Chest For PE Angio; Complete Time: 21:08                                 rn  

05/15                                                                                             

18:23 Order name: EKG; Complete Time: 18:23                                                   snw 

05/15                                                                                             

18:23 Order name: EKG - Nurse/Tech; Complete Time: 19:16                                      snw 

05/15                                                                                             

18:45 Order name: IV Start; Complete Time: 19:17                                              rn  

                                                                                                  

EC:49 Rate is 106 beats/min. Rhythm is regular. QRS Axis is Normal. VT interval is normal.    rn  

      QRS interval is normal. QT interval is normal. No Q waves. T waves are Normal. No ST        

      changes noted. Clinical impression: Sinus tachycardia. Interpreted by me.                   

                                                                                                  

Administered Medications:                                                                         

19:27 Drug: NS 0.9% 500 ml Route: IV; Rate: bolus; Site: right hand;                          mb3 

21:48 Follow up: IV Status: Completed infusion; IV Intake: 500ml                              mb3 

23:23 Follow up: IV Status: Completed infusion; IV Intake: 500ml                              mb3 

19:27 Drug: Demerol 25 mg Route: IVP; Site: right hand;                                       mb3 

21:48 Follow up: Response: No adverse reaction; Pain is decreased                             mb3 

19:27 Drug: Phenergan 25 mg Route: IVP; Site: right hand;                                     mb3 

21:47 Follow up: Response: No adverse reaction                                                mb3 

21:15 CANCELLED (Duplicate Order): Benadryl 12.5 mg IVP once                                  rn  

21:47 Drug: NS 0.9% 1000 ml Route: IV; Rate: 1000 ml; Site: right antecubital;                mb3 

23:24 Follow up: Response: No adverse reaction; IV Status: Completed infusion; IV Intake:     mb3 

      1000ml                                                                                      

21:47 Drug: Demerol 25 mg Route: IVP; Site: right antecubital;                                mb3 

23:24 Follow up: Response: No adverse reaction                                                mb3 

21:47 Drug: Phenergan 12.5 mg Route: IVP; Site: right antecubital;                            mb3 

23:24 Follow up: Response: No adverse reaction                                                mb3 

                                                                                                  

                                                                                                  

Disposition:                                                                                      

05/15/18 21:17 Discharged to Home. Impression: Chest pain, unspecified, Dehydration.              

- Condition is Stable.                                                                            

- Discharge Instructions: Nonspecific Chest Pain, Chest Wall Pain, Pain Without a Known           

  Cause.                                                                                          

                                                                                                  

- Medication Reconciliation Form, Thank You Letter, Antibiotic Education, Prescription            

  Opioid Use form.                                                                                

- Follow up: Private Physician; When: As needed; Reason: Recheck today's complaints,              

  Re-evaluation by your physician.                                                                

- Problem is new.                                                                                 

- Symptoms have improved.                                                                         

                                                                                                  

                                                                                                  

                                                                                                  

Signatures:                                                                                       

Dispatcher [employer]                           [facility]                                                 

Alyson Rodríguez, FNP-C                 FNP-Csnw                                                  

Anuel Berry MD MD rn Barnett, Mark RN                       RN   mb3                                                  

                                                                                                  

Corrections: (The following items were deleted from the chart)                                    

18:49 18:49 CREATININE, SERUM+[id number].LAB.BRZ ordered. [facility]

21:15 21:15 Benadryl 12.5 mg IVP once ordered. rn                                             rn  

21:17 21:17 05/15/2018 21:17 Discharged to Home. Impression: Chest pain, unspecified.         rn  

      Condition is Stable. Forms are Medication Reconciliation Form, Thank You Letter,            

      Antibiotic Education, Prescription Opioid Use. Follow up: Private Physician; When: As       

      needed; Reason: Recheck today's complaints, Re-evaluation by your physician. Problem is     

      new. Symptoms have improved. rn                                                             

23:25 21:17 05/15/2018 21:17 Discharged to Home. Impression: Chest pain, unspecified;         mb3 

      Dehydration. Condition is Stable. Forms are Medication Reconciliation Form, Thank You       

      Letter, Antibiotic Education, Prescription Opioid Use. Follow up: Private Physician;        

      When: As needed; Reason: Recheck today's complaints, Re-evaluation by your physician.       

      Problem is new. Symptoms have improved. rn                                                  

                                                                                                  

**************************************************************************************************

## 2018-05-15 NOTE — ER
Nurse's Notes                                                                                     

 [facility]                                                                

Name: Redd Dale Jr                                                                            

Age: 32 yrs                                                                                       

Sex: Male                                                                                         

: 1985                                                                                   

MRN: [id number]                                                                                   

Arrival Date: 05/15/2018                                                                          

Time: 18:10                                                                                       

Account#: [id number]                                                                            

Bed 25                                                                                            

Private MD:                                                                                       

Diagnosis: Chest pain, unspecified;Dehydration                                                    

                                                                                                  

Presentation:                                                                                     

05/15                                                                                             

18:16 Presenting complaint: Patient states: chest pain started this morning and hasn't gotten mb3 

      any better. Rates chest pain at 10 on 0 to 10 scale. Transition of care: patient was        

      not received from another setting of care. Onset of symptoms was May 14, 2018. Initial      

      Sepsis Screen: Does the patient meet any 2 criteria? No. Patient's initial sepsis           

      screen is negative. Does the patient have a suspected source of infection? No.              

      Patient's initial sepsis screen is negative. Care prior to arrival: Medication(s)           

      given: Benadryl 25 mg IVPush, aspirin 324 mg po, fentanyl 150 mcg IV push. IV               

      initiated. 22 GA, hand.                                                                     

18:16 Method Of Arrival: EMS: [facility]                                                    mb3 

18:16 Acuity: AYESHA 3                                                                           mb3 

                                                                                                  

Triage Assessment:                                                                                

18:23 General: Appears uncomfortable, obese, Behavior is calm, cooperative, appropriate for   mb3 

      age. Pain: Complains of pain in chest Pain does not radiate. Pain at worst was 10 out       

      of 10 on a pain scale. EENT: No signs and/or symptoms were reported regarding the EENT      

      system. Neuro: No deficits noted. Level of Consciousness is awake, alert, obeys             

      commands, Oriented to person, place, time, situation. Cardiovascular: Reports chest         

      pain, nausea, Heart tones S1 S2 present Capillary refill < 3 seconds Pulses are all         

      present. Respiratory: Airway is patent Respiratory effort is even, unlabored,               

      Respiratory pattern is regular, symmetrical, Breath sounds are clear bilaterally. GI:       

      Abdomen is round obese, Bowel sounds present X 4 quads. : No signs and/or symptoms        

      were reported regarding the genitourinary system. Musculoskeletal: Scoliosis noted          

      lower legs atrophied and deformed.                                                          

                                                                                                  

Historical:                                                                                       

- PMHx:                                                                                           

18:22 Asthma; Cerebral Palsy; cluster headaches; decubitus ulcers on feet; GERD;              mb3 

      Hydrocephalus; Hypertension; spina bifida;                                                  

                                                                                                  

- Immunization history:: Adult Immunizations up to date.                                          

- Social history:: Smoking status: Patient uses tobacco products, smokes one pack                 

  cigarettes per day.                                                                             

- Family history:: not pertinent.                                                                 

- Hospitalizations: : No recent hospitalization is reported.                                      

                                                                                                  

                                                                                                  

Screenin:28 Abuse screen: Denies threats or abuse. Nutritional screening: No deficits noted.        mb3 

      Tuberculosis screening: No symptoms or risk factors identified. Fall Risk Secondary         

      diagnosis (15 points) IV access (20 points). Ambulatory Aid- None/Bed Rest/Nurse Assist     

      (0 pts). Gait- Impaired (20 pts.). Mental Status- Oriented to own ability (0 pts).          

      Total Kim Fall Scale indicates High Risk Score (45 or more points).                       

                                                                                                  

Assessment:                                                                                       

18:27 General: see triage assessment. Pain: Pain began this morning.                          mb3 

                                                                                                  

Vital Signs:                                                                                      

18:26  / 77; Pulse 122; Resp 20; Temp 99.1(O); Pulse Ox 96% on R/A; Weight 131.54 kg;   mb3 

      Height 4 ft. 11 in. (149.86 cm); Pain 10/10;                                                

20:00  / 72; Pulse 119; Resp 18; Pulse Ox 98% on R/A;                                   mb3 

21:49  / 93; Pulse 107; Resp 18; Pulse Ox 97% ;                                         mb3 

18:26 Body Mass Index 58.57 (131.54 kg, 149.86 cm)                                            mb3 

                                                                                                  

ED Course:                                                                                        

18:10 Patient arrived in ED.                                                                  rg4 

18:16 Daniel Rivera, RN is Primary Nurse.                                                     mb3 

18:20 Triage completed.                                                                       mb3 

18:27 Patient has correct armband on for positive identification. Bed in low position. Call   mb3 

      light in reach. Side rails up X 1. Cardiac monitor on. Pulse ox on. NIBP on.                

18:29 Arm band placed on.                                                                     mb3 

18:40 Anuel Berry MD is Attending Physician.                                                rn  

18:52 Radiology exam delayed due to lab results not completed at this time. (BUN/Creatinine). sj  

19:18 Radiology exam delayed due to lab results not completed at this time. (BUN/Creatinine). sj  

19:38 X-ray completed. Portable x-ray completed in exam room. Patient tolerated procedure     mh1 

      well.                                                                                       

19:39 Chest Single View XRAY In Process Unspecified.                                          EDMS

19:40 Radiology exam delayed due to lab results not completed at this time. (BUN/Creatinine). sj  

19:42 Radiology exam delayed due to IV insertion attempt and/or patient not having            sj  

      appropriate IV at this time.                                                                

20:13 Radiology exam delayed due to lab results not completed at this time. (BUN/Creatinine). vm2 

20:39 Patient moved to CT.                                                                    sj  

20:58 CT completed. Patient tolerated procedure well. Patient moved back from CT.             sj  

20:58 CT Chest For PE Angio In Process Unspecified.                                           EDMS

21:51 ultasound guided peripheral IV. Inserted saline lock: 22 gauge in right antecubital     mb3 

      area, using aseptic technique. Patient maintains SpO2 saturation greater than 95% on        

      room air.                                                                                   

22:54 IV discontinued, intact, bleeding controlled, No redness/swelling at site. Pressure     mb3 

      dressing applied.                                                                           

22:54 IV discontinued, intact, bleeding controlled, No redness/swelling at site. Pressure     mb3 

      dressing applied.                                                                           

                                                                                                  

Administered Medications:                                                                         

19:27 Drug: NS 0.9% 500 ml Route: IV; Rate: bolus; Site: right hand;                          mb3 

21:48 Follow up: IV Status: Completed infusion; IV Intake: 500ml                              mb3 

23:23 Follow up: IV Status: Completed infusion; IV Intake: 500ml                              mb3 

19:27 Drug: Demerol 25 mg Route: IVP; Site: right hand;                                       mb3 

21:48 Follow up: Response: No adverse reaction; Pain is decreased                             mb3 

19:27 Drug: Phenergan 25 mg Route: IVP; Site: right hand;                                     mb3 

21:47 Follow up: Response: No adverse reaction                                                mb3 

21:15 CANCELLED (Duplicate Order): Benadryl 12.5 mg IVP once                                  rn  

21:47 Drug: NS 0.9% 1000 ml Route: IV; Rate: 1000 ml; Site: right antecubital;                mb3 

23:24 Follow up: Response: No adverse reaction; IV Status: Completed infusion; IV Intake:     mb3 

      1000ml                                                                                      

21:47 Drug: Demerol 25 mg Route: IVP; Site: right antecubital;                                mb3 

23:24 Follow up: Response: No adverse reaction                                                mb3 

21:47 Drug: Phenergan 12.5 mg Route: IVP; Site: right antecubital;                            mb3 

23:24 Follow up: Response: No adverse reaction                                                mb3 

                                                                                                  

                                                                                                  

Intake:                                                                                           

21:48 IV: 500ml; Total: 500ml.                                                                mb3 

23:23 IV: 500ml; Total: 1000ml.                                                               mb3 

23:24 IV: 1000ml; Total: 2000ml.                                                              mb3 

                                                                                                  

Outcome:                                                                                          

21:17 Discharge ordered by MD.                                                                rn  

23:23 Discharged to home via wheelchair.                                                      mb3 

23:23 Condition: stable                                                                           

23:23 Discharge instructions given to patient, Instructed on discharge instructions, follow       

      up and referral plans. Demonstrated understanding of instructions, follow-up care.          

23:25 Patient left the ED.                                                                    mb3 

                                                                                                  

Signatures:                                                                                       

Dispatcher [employer]                           EDMS                                                 

Nadia Shabazz                               1                                                  

Kori Ash Roman, MD MD rn Garcia, Rubi rg4 McGuire, Victoria                            [facility]                                                  

Daniel Rivera RN                       RN   mb3                                                  

                                                                                                  

**************************************************************************************************

## 2018-05-15 NOTE — XMS REPORT
Patient Summary Document

 Created on:May 15, 2018



Patient:JORDAN VERDIN

Sex:Male

:1985

External Reference #:[id number]





Demographics







 Address  [address]



   [place] [address]

 

 Home Phone  [phone]

 

 Work Phone  [phone]

 

 Email Address  [phone]

 

 Preferred Language  Unknown

 

 Marital Status  Unknown

 

 Holiness Affiliation  Unknown

 

 Race  Unknown

 

 Additional Race(s)  Unavailable

 

 Ethnic Group  Unknown









Author







 Organization  [facility]

 

 Address  [address] [place] [address] [address]. Roper [address] [place] [address]

 

 Phone  [phone]









Care Team Providers







 Name  Role  Phone

 

 RAMU TORRES  Unavailable  Unavailable









Problems

This patient has no known problems.



Allergies, Adverse Reactions, Alerts

This patient has no known allergies or adverse reactions.



Medications

This patient has no known medications.



Results







 Test Description  Test Time  Test Comments  Text Results  Atomic Results  
Result Comments









 BLOOD CULTURE  2017 16:22:00    









   Test Item  Value  Reference Range  Comments









 CULTURE (BEAKER) (test code=1095)  No growth in 5 days    



BLOOD [id number] 16:22:00





 Test Item  Value  Reference Range  Comments

 

 CULTURE (BEAKER) (test code=1095)  No growth in 5 days    



(MANUAL DIFFERENTIAL)2017 22:29:00





 Test Item  Value  Reference Range  Comments

 

 TOTAL COUNTED (BEAKER) (test code=1351)      

 

 WBC MORPHOLOGY (BEAKER) (test code=487)  Normal    

 

 PLT MORPHOLOGY (BEAKER) (test code=486)  Normal    

 

 RBC MORPHOLOGY (BEAKER) (test code=762)  Normal    



CBC W/PLT COUNT &amp; AUTO [id number] 22:28:00





 Test Item  Value  Reference Range  Comments

 

 WHITE BLOOD CELL COUNT (BEAKER) (test code=775)  7.3 K/ L  4.0-10.0  

 

 RED BLOOD CELL COUNT (BEAKER) (test code=761)  5.09 M/ L  4.20-5.80  

 

 HEMOGLOBIN (BEAKER) (test code=410)  13.0 GM/DL  13.0-16.8  

 

 HEMATOCRIT (BEAKER) (test code=411)  42.3 %  40.0-50.0  

 

 MEAN CORPUSCULAR VOLUME (BEAKER) (test code=753)  83.0 fL  82.0-98.0  

 

 MEAN CORPUSCULAR HEMOGLOBIN (BEAKER) (test  25.6 pg  27.0-33.0  



 code=751)      

 

 MEAN CORPUSCULAR HEMOGLOBIN CONC (BEAKER) (test  30.8 GM/DL  32.0-36.0  



 code=752)      

 

 RED CELL DISTRIBUTION WIDTH (BEAKER) (test  18.0 %  10.3-14.2  



 code=412)      

 

 PLATELET COUNT (BEAKER) (test code=756)  331 K/CU MM  150-430  

 

 MEAN PLATELET VOLUME (BEAKER) (test code=754)  8.5 fL  6.5-10.5  

 

 NUCLEATED RED BLOOD CELLS (BEAKER) (test  0 /100 WBC  0-0  



 code=413)      

 

 NEUTROPHILS RELATIVE PERCENT (BEAKER) (test  65 %    



 code=429)      

 

 LYMPHOCYTES RELATIVE PERCENT (BEAKER) (test  23 %    



 code=430)      

 

 MONOCYTES RELATIVE PERCENT (BEAKER) (test  8 %    



 code=431)      

 

 EOSINOPHILS RELATIVE PERCENT (BEAKER) (test  3 %    



 code=432)      

 

 BASOPHILS RELATIVE PERCENT (BEAKER) (test  1 %    



 code=437)      

 

 NEUTROPHILS ABSOLUTE COUNT (BEAKER) (test  4.75 K/ L  1.80-8.00  



 code=670)      

 

 LYMPHOCYTES ABSOLUTE COUNT (BEAKER) (test  1.68 K/ L  1.48-4.50  



 code=414)      

 

 MONOCYTES ABSOLUTE COUNT (BEAKER) (test  0.55 K/ L  0.00-1.30  



 code=415)      

 

 EOSINOPHILS ABSOLUTE COUNT (BEAKER) (test  0.24 K/ L  0.00-0.50  



 code=416)      

 

 BASOPHILS ABSOLUTE COUNT (BEAKER) (test  0.05 K/ L  0.00-0.20  



 code=417)      



[id number] METABOLIC [id number] 11:11:00





 Test Item  Value  Reference Range  Comments

 

 SODIUM (BEAKER) (test  138 meq/L  136-145  



 ujlw=020)      

 

 POTASSIUM (BEAKER) (test  4.0 meq/L  3.5-5.1  



 code=379)      

 

 CHLORIDE (BEAKER) (test  108 meq/L    



 code=382)      

 

 CO2 (BEAKER) (test  23 meq/L  22-29  



 code=355)      

 

 BLOOD UREA NITROGEN  11 mg/dL  7-21  



 (BEAKER) (test code=354)      

 

 CREATININE (BEAKER) (test  0.74 mg/dL  0.57-1.25  



 code=358)      

 

 GLUCOSE RANDOM (BEAKER)  124 mg/dL    



 (test code=652)      

 

 CALCIUM (BEAKER) (test  8.9 mg/dL  8.4-10.2  



 code=697)      

 

 EGFR (BEAKER) (test  150 mL/min/1.73 sq m    ESTIMATED GFR IS NOT AS



 code=1092)      ACCURATE AS CREATININE



       CLEARANCE IN PREDICTING



       GLOMERULAR FILTRATION



       RATE. ESTIMATED GFR IS



       NOT APPLICABLE FOR



       DIALYSIS PATIENTS.



URINE [id number] 09:56:00





 Test Item  Value  Reference Range  Comments

 

 CULTURE (BEAKER) (test code=1095)  <10,000 col/mL skin jaime    



COMPREHENSIVE METABOLIC [id number] 08:49:00





 Test Item  Value  Reference Range  Comments

 

 TOTAL PROTEIN (BEAKER)  7.3 gm/dL  6.0-8.3  



 (test code=770)      

 

 ALBUMIN (BEAKER) (test  3.3 g/dL  3.5-5.0  



 code=1145)      

 

 ALKALINE PHOSPHATASE  89 U/L    



 (BEAKER) (test code=346)      

 

 BILIRUBIN TOTAL (BEAKER)  1.4 mg/dL  0.2-1.2  



 (test code=377)      

 

 SODIUM (BEAKER) (test  137 meq/L  136-145  



 pcnx=909)      

 

 POTASSIUM (BEAKER) (test  3.7 meq/L  3.5-5.1  



 code=379)      

 

 CHLORIDE (BEAKER) (test  108 meq/L    



 code=382)      

 

 CO2 (BEAKER) (test  17 meq/L  22-29  



 code=355)      

 

 BLOOD UREA NITROGEN  12 mg/dL  7-21  



 (BEAKER) (test code=354)      

 

 CREATININE (BEAKER) (test  0.78 mg/dL  0.57-1.25  



 code=358)      

 

 GLUCOSE RANDOM (BEAKER)  119 mg/dL    



 (test code=652)      

 

 CALCIUM (BEAKER) (test  8.6 mg/dL  8.4-10.2  



 code=697)      

 

 AST (SGOT) (BEAKER) (test  13 U/L  5-34  



 code=353)      

 

 ALT (SGPT) (BEAKER) (test  28 U/L  6-55  



 code=347)      

 

 EGFR (BEAKER) (test  141 mL/min/1.73 sq    ESTIMATED GFR IS NOT AS



 code=1092)  m    ACCURATE AS CREATININE



       CLEARANCE IN PREDICTING



       GLOMERULAR FILTRATION



       RATE. ESTIMATED GFR IS



       NOT APPLICABLE FOR



       DIALYSIS PATIENTS.



CBC W/PLT COUNT &amp; AUTO [id number] 08:46:00





 Test Item  Value  Reference Range  Comments

 

 WHITE BLOOD CELL COUNT (BEAKER) (test code=775)  9.4 K/ L  4.0-10.0  

 

 RED BLOOD CELL COUNT (BEAKER) (test code=761)  4.79 M/ L  4.20-5.80  

 

 HEMOGLOBIN (BEAKER) (test code=410)  12.8 GM/DL  13.0-16.8  

 

 HEMATOCRIT (BEAKER) (test code=411)  40.0 %  40.0-50.0  

 

 MEAN CORPUSCULAR VOLUME (BEAKER) (test code=753)  83.4 fL  82.0-98.0  

 

 MEAN CORPUSCULAR HEMOGLOBIN (BEAKER) (test  26.7 pg  27.0-33.0  



 code=751)      

 

 MEAN CORPUSCULAR HEMOGLOBIN CONC (BEAKER) (test  32.0 GM/DL  32.0-36.0  



 code=752)      

 

 RED CELL DISTRIBUTION WIDTH (BEAKER) (test  18.2 %  10.3-14.2  



 code=412)      

 

 PLATELET COUNT (BEAKER) (test code=756)  313 K/CU MM  150-430  

 

 MEAN PLATELET VOLUME (BEAKER) (test code=754)  8.6 fL  6.5-10.5  

 

 NUCLEATED RED BLOOD CELLS (BEAKER) (test  0 /100 WBC  0-0  



 code=413)      

 

 NEUTROPHILS RELATIVE PERCENT (BEAKER) (test  70 %    



 code=429)      

 

 LYMPHOCYTES RELATIVE PERCENT (BEAKER) (test  16 %    



 code=430)      

 

 MONOCYTES RELATIVE PERCENT (BEAKER) (test  12 %    



 code=431)      

 

 EOSINOPHILS RELATIVE PERCENT (BEAKER) (test  1 %    



 code=432)      

 

 BASOPHILS RELATIVE PERCENT (BEAKER) (test  1 %    



 code=437)      

 

 NEUTROPHILS ABSOLUTE COUNT (BEAKER) (test  6.54 K/ L  1.80-8.00  



 code=670)      

 

 LYMPHOCYTES ABSOLUTE COUNT (BEAKER) (test  1.50 K/ L  1.48-4.50  



 code=414)      

 

 MONOCYTES ABSOLUTE COUNT (BEAKER) (test  1.13 K/ L  0.00-1.30  



 code=415)      

 

 EOSINOPHILS ABSOLUTE COUNT (BEAKER) (test  0.13 K/ L  0.00-0.50  



 code=416)      

 

 BASOPHILS ABSOLUTE COUNT (BEAKER) (test  0.06 K/ L  0.00-0.20  



 code=417)      



0.00URINALYSIS W/ [id number] 20:36:00





 Test Item  Value  Reference Range  Comments

 

 COLOR (BEAKER) (test code=470)  Yellow    

 

 CLARITY (BEAKER) (test code=469)  Hazy    

 

 SPECIFIC GRAVITY UA (BEAKER) (test code=468)  1.012  1.001-1.035  

 

 PH UA (BEAKER) (test code=467)  5.5  5.0-8.0  

 

 PROTEIN UA (BEAKER) (test code=464)  100 mg/dL  Negative  

 

 GLUCOSE UA (BEAKER) (test code=365)  Negative  Negative  

 

 KETONES UA (BEAKER) (test code=371)  Negative  Negative  

 

 BILIRUBIN UA (BEAKER) (test code=462)  Negative  Negative  

 

 BLOOD UA (BEAKER) (test code=461)  Moderate  Negative  

 

 NITRITE UA (BEAKER) (test code=465)  Negative  Negative  

 

 LEUKOCYTE ESTERASE UA (BEAKER) (test code=466)  Large  Negative  

 

 UROBILINOGEN UA (BEAKER) (test code=463)  3.0 mg/dL  0.2-1.0  

 

 RBC UA (BEAKER) (test code=519)  19 /HPF    

 

 WBC UA (BEAKER) (test code=520)  182 /HPF    

 

 SOURCE(BEAKER) (test code=8403)      



BASIC METABOLIC [id number] 17:00:00





 Test Item  Value  Reference Range  Comments

 

 SODIUM (BEAKER) (test  140 meq/L  136-145  



 ttdr=285)      

 

 POTASSIUM (BEAKER) (test  3.7 meq/L  3.5-5.1  



 code=379)      

 

 CHLORIDE (BEAKER) (test  112 meq/L    



 code=382)      

 

 CO2 (BEAKER) (test  17 meq/L  22-29  



 code=355)      

 

 BLOOD UREA NITROGEN  23 mg/dL  7-21  



 (BEAKER) (test code=354)      

 

 CREATININE (BEAKER) (test  1.00 mg/dL  0.57-1.25  



 code=358)      

 

 GLUCOSE RANDOM (BEAKER)  102 mg/dL    



 (test code=652)      

 

 CALCIUM (BEAKER) (test  8.7 mg/dL  8.4-10.2  



 code=697)      

 

 EGFR (BEAKER) (test  106 mL/min/1.73 sq m    ESTIMATED GFR IS NOT AS



 code=1092)      ACCURATE AS CREATININE



       CLEARANCE IN PREDICTING



       GLOMERULAR FILTRATION



       RATE. ESTIMATED GFR IS



       NOT APPLICABLE FOR



       DIALYSIS PATIENTS.



Specimen slightly ictericCBC W/PLT COUNT &amp; AUTO [id number] 12:18
:00





 Test Item  Value  Reference Range  Comments

 

 WHITE BLOOD CELL COUNT (BEAKER) (test code=775)  16.4 K/ L  4.0-10.0  

 

 RED BLOOD CELL COUNT (BEAKER) (test code=761)  5.22 M/ L  4.20-5.80  

 

 HEMOGLOBIN (BEAKER) (test code=410)  14.4 GM/DL  13.0-16.8  

 

 HEMATOCRIT (BEAKER) (test code=411)  42.9 %  40.0-50.0  

 

 MEAN CORPUSCULAR VOLUME (BEAKER) (test code=753)  82.2 fL  82.0-98.0  

 

 MEAN CORPUSCULAR HEMOGLOBIN (BEAKER) (test  27.5 pg  27.0-33.0  



 code=751)      

 

 MEAN CORPUSCULAR HEMOGLOBIN CONC (BEAKER) (test  33.5 GM/DL  32.0-36.0  



 code=752)      

 

 RED CELL DISTRIBUTION WIDTH (BEAKER) (test  16.2 %  10.3-14.2  



 code=412)      

 

 PLATELET COUNT (BEAKER) (test code=756)  329 K/CU MM  150-430  

 

 MEAN PLATELET VOLUME (BEAKER) (test code=754)  8.2 fL  6.5-10.5  

 

 NUCLEATED RED BLOOD CELLS (BEAKER) (test  0 /100 WBC  0-0  



 code=413)      

 

 NEUTROPHILS RELATIVE PERCENT (BEAKER) (test  83 %    



 code=429)      

 

 LYMPHOCYTES RELATIVE PERCENT (BEAKER) (test  7 %    



 code=430)      

 

 MONOCYTES RELATIVE PERCENT (BEAKER) (test  9 %    



 code=431)      

 

 EOSINOPHILS RELATIVE PERCENT (BEAKER) (test  0 %    



 code=432)      

 

 BASOPHILS RELATIVE PERCENT (BEAKER) (test  0 %    



 code=437)      

 

 NEUTROPHILS ABSOLUTE COUNT (BEAKER) (test  1.62 K/ L  1.80-8.00  



 code=670)      

 

 LYMPHOCYTES ABSOLUTE COUNT (BEAKER) (test  1.15 K/ L  1.48-4.50  



 code=414)      

 

 MONOCYTES ABSOLUTE COUNT (BEAKER) (test  1.56 K/ L  0.00-1.30  



 code=415)      

 

 EOSINOPHILS ABSOLUTE COUNT (BEAKER) (test  0.01 K/ L  0.00-0.50  



 code=416)      

 

 BASOPHILS ABSOLUTE COUNT (BEAKER) (test  0.02 K/ L  0.00-0.20  



 code=417)      



(MANUAL DIFFERENTIAL)2017 12:18:00





 Test Item  Value  Reference Range  Comments

 

 TOTAL COUNTED (BEAKER) (test code=1351)      

 

 WBC MORPHOLOGY (BEAKER) (test code=487)  Normal    

 

 PLT MORPHOLOGY (BEAKER) (test code=486)  Normal    

 

 RBC MORPHOLOGY (BEAKER) (test code=762)  Normal

## 2018-05-15 NOTE — RAD REPORT
EXAM DESCRIPTION:  CT - Chest For Pe Angio - 5/15/2018 8:58 pm

 

CLINICAL HISTORY:  Chest pain.

 

COMPARISON:  10/21/2015

 

TECHNIQUE:  CT angiogram of the pulmonary arteries was performed with MIP.

 

All CT scans are performed using dose optimization technique as appropriate and may include automated
 exposure control or mA/KV adjustment according to patient size.

 

FINDINGS:  No evidence of pulmonary thromboembolism.

 

No acute aortic finding demonstrated.

 

The lungs are clear.

 

No significant pericardial or pleural fluid.

 

No concerning bony finding.

 

IMPRESSION:  No evidence of pulmonary thromboembolism.

 

No acute lung findings.

## 2018-05-15 NOTE — XMS REPORT
Clinical Summary

 Created on:May 15, 2018



Patient:Redd Dale

Sex:Male

:1985

External Reference #:[id number]





Demographics







 Address  [address]



   [place] [address]

 

 Home Phone  [phone]

 

 Preferred Language  English

 

 Marital Status  Unknown

 

 [employer] Affiliation  Unknown

 

 Race  Black or 

 

 Ethnic Group  Not  or 









Author







 Organization  [facility]

 

 Address  [address] [place] [address]

 

 Phone  [phone]









Support







 Name  Relationship  Address  Phone

 

 Unavailable  Unavailable  [address]  [phone]



     [place] [address]  









Care Team Providers







 Name  Role  Phone

 

 Unavailable  Primary Care Provider  Unavailable









Allergies







 Active Allergy  Reactions  Severity  Noted Date  Comments

 

 Sulfamethoxazole-Trimethoprim  Hives  High  2017  

 

 Levofloxacin  Hives  High  2017  

 

 Morphine  Hives  High  2017  

 

 Ketorolac  Rash  High  2017  

 

 Vancomycin Analogues  Rash  High  2017  

 

 Sesame Seed  Hives    2017  

 

 Ondansetron Hcl (Pf)  Nausea And Vomiting    2017  







Current Medications







 Prescription  Sig.  Disp.  Refills  Start Date  End Date  Status

 

 oxyCODONE-acetaminoph  Take 1 tablet          Active



 en (PERCOCET)   by mouth every          



 mg per tablet  4 (four) hours          



   as needed for          



   Pain.          

 

 cefdinir (OMNICEF)  Take 1 capsule  26 capsule  0  2017  




 300 MG capsule  (300 mg total)          



   by mouth every          



   12 (twelve)          



   hours for 13          



   days.          







Active Problems







 Problem  Noted Date

 

 Spina bifida (HCC)  2017

 

 Pyelonephritis  2017







Encounters







 Date  Type  Specialty  Care Team  Description

 

 06/10/2017 -  Hospital Encounter  General Internal  Cesar, Chimkama  
Pyelonephritis;Sepsis



 2017    Medicine  MD Jennifer



  , due to unspecified



       Walls, Jose Martin  organism ([facility]);Sierra Tejada MD



  bifida, unspecified



       Josefa Hardwick  hydrocephalus



       MD Kiara  presence, unspecified



         spinal region ([facility])



after 2017



Social History







 Tobacco Use  Types  Packs/Day  Years Used  Date

 

 Current Every Day Smoker  Cigarettes  0.5    









 Tobacco Cessation: Ready to Quit: No









 Sex Assigned at Birth  Date Recorded

 

 Not on file  







Last Filed Vital Signs







 Vital Sign  Reading  Time Taken

 

 Blood Pressure  128/66  2017 11:18 AM CDT

 

 Pulse  89  2017 11:18 AM CDT

 

 Temperature  36.6 C (97.8 F)  2017 11:18 AM CDT

 

 Respiratory Rate  19  2017 11:18 AM CDT

 

 Oxygen Saturation  95%  2017  3:43 AM CDT

 

 Inhaled Oxygen Concentration  -  -

 

 Weight  131.1 kg (289 lb)  2017  1:00 AM CDT

 

 Height  149.9 cm (4' 11")  2017  1:00 AM CDT

 

 Body Mass Index  58.37  2017  1:00 AM CDT







Plan of Treatment

Not on file



Results

RHYTHM STRIP - SCAN (2017 10:10 AM)Manual Differential (2017  9:58 
AM)Only the most recent of2 resultswithin the time period is included.





 Component  Value  Ref Range

 

 Total Counted    

 

 WBC Morphology  Normal  

 

 Platelet Morphology  Normal  

 

 RBC Morphology  Normal  









 Specimen  Performing Laboratory

 

 Blood - Arm, [address] [address]



CBC with platelet count + automated diff (2017  9:58 AM)Only the most 
recent of3 resultswithin the time period is included.





 Component  Value  Ref Range

 

 WBC  7.3  4.0 - 10.0 K/L

 

 RBC  5.09  4.20 - 5.80 M/L

 

 Hemoglobin  13.0  13.0 - 16.8 GM/DL

 

 Hematocrit  42.3  40.0 - 50.0 %

 

 MCV  83.0  82.0 - 98.0 fL

 

 MCH  25.6 (L)  27.0 - 33.0 pg

 

 MCHC  30.8 (L)  32.0 - 36.0 GM/DL

 

 RDW  18.0 (H)  10.3 - 14.2 %

 

 Platelets  331  150 - 430 K/CU MM

 

 MPV  8.5  6.5 - 10.5 fL

 

 nRBC  0  0 - 0 /100 WBC

 

 % Neutros  65  %

 

 % Lymphs  23  %

 

 % Monos  8  %

 

 % Eos  3  %

 

 % Baso  1  %

 

 # Neutros  4.75  1.80 - 8.00 K/L

 

 # Lymphs  1.68  1.48 - 4.50 K/L

 

 # Monos  0.55  0.00 - 1.30 K/L

 

 # Eos  0.24  0.00 - 0.50 K/L

 

 # Baso  0.05  0.00 - 0.20 K/L









 Specimen  Performing Laboratory

 

 Blood - Arm, [address] [address]









 Narrative

 

 0.00







 0.52







 0.00







 0.00







 0.00







 0.00



CBC with platelet count + automated diff (2017  9:58 AM)Only the most 
recent of3 resultswithin the time period is included.





 Specimen  Performing Laboratory

 

 Blood  









 Narrative

 

 The following orders were created for panel order CBC with platelet count + 
automated



 diff.







 Procedure



 Abnormality Status 







 ---------



 ----------- ------ 







 CBC with platelet count ...[[id number]]AbnormalFinal



 result 







 Manual



 Differential[[id number]]
Fi



 nal result 







  







 Please view results for these tests on the individual orders.



Basic Metabolic Panel (2017  9:58 AM)Only the most recent of2 
resultswithin the time period is included.





 Component  Value  Ref Range

 

 Sodium  138  136 - 145 meq/L

 

 Potassium  4.0  3.5 - 5.1 meq/L

 

 Chloride  108 (H)  98 - 107 meq/L

 

 CO2  23  22 - 29 meq/L

 

 BUN  11  7 - 21 mg/dL

 

 Creatinine  0.74  0.57 - 1.25 mg/dL

 

 Glucose  124 (H)  70 - 105 mg/dL

 

 Calcium  8.9  8.4 - 10.2 mg/dL

 

 EGFR  150Comment: ESTIMATED GFR IS NOT AS ACCURATE AS  mL/min/1.73 sq m



   CREATININE CLEARANCE IN PREDICTING GLOMERULAR FILTRATION  



   RATE. ESTIMATED GFR IS NOT APPLICABLE FOR DIALYSIS  



   PATIENTS.  









 Specimen  Performing Laboratory

 

 Blood - Arm, [address] [address]



Comprehensive metabolic panel (2017  8:09 AM)





 Component  Value  Ref Range

 

 Protein, Total  7.3  6.0 - 8.3 gm/dL

 

 Albumin  3.3 (L)  3.5 - 5.0 g/dL

 

 Alkaline Phosphatase  89  40 - 150 U/L

 

 Total Bilirubin  1.4 (H)  0.2 - 1.2 mg/dL

 

 Sodium  137  136 - 145 meq/L

 

 Potassium  3.7  3.5 - 5.1 meq/L

 

 Chloride  108 (H)  98 - 107 meq/L

 

 CO2  17 (L)  22 - 29 meq/L

 

 BUN  12  7 - 21 mg/dL

 

 Creatinine  0.78  0.57 - 1.25 mg/dL

 

 Glucose  119 (H)  70 - 105 mg/dL

 

 Calcium  8.6  8.4 - 10.2 mg/dL

 

 AST  13  5 - 34 U/L

 

 ALT  28  6 - 55 U/L

 

 EGFR  141Comment: ESTIMATED GFR IS NOT AS ACCURATE  mL/min/1.73 sq m



   AS CREATININE CLEARANCE IN PREDICTING  



   GLOMERULAR FILTRATION RATE. ESTIMATED GFR IS  



   NOT APPLICABLE FOR DIALYSIS PATIENTS.  









 Specimen  Performing Laboratory

 

 Blood - Arm, [address] [address]



Urinalysis w/ Microscopic (2017  4:57 AM)





 Component  Value  Ref Range

 

 Color, UA  Yellow  

 

 Clarity, UA  Hazy  

 

 Specific Gravity, UA  1.012  1.001 - 1.035

 

 pH, UA  5.5  5.0 - 8.0

 

 Protein, UA  100 mg/dL (A)  Negative

 

 Glucose, UA  Negative  Negative

 

 Ketones, UA  Negative  Negative

 

 Bilirubin, UA  Negative  Negative

 

 Blood, UA  Moderate (A)  Negative

 

 Nitrite, UA  Negative  Negative

 

 Leukocytes, UA  Large (A)  Negative

 

 Urobilinogen, UA  3.0 (H)  0.2 - 1.0 mg/dL

 

 RBC, UA  19  /HPF

 

 WBC, UA  182  /HPF

 

 Specimen Source    









 Specimen  Performing Laboratory

 

 Urine  [address] [address]



Urine culture (2017  4:57 AM)





 Component  Value  Ref Range

 

 Result  <10,000 col/mL skin jaime  









 Specimen  Performing Laboratory

 

 Urine - Urine, Urostomy  [address] [address]



Blood culture #1 (2017  3:26 AM)Only the most recent of2 resultswithin 
the time period is included.





 Component  Value  Ref Range

 

 Result  No growth in 5 days  









 Specimen  Performing Laboratory

 

 Blood - Arm, [address] [address]



after 2017

## 2018-05-16 NOTE — EKG
Test Date:    2018-05-15               Test Time:    20:41:23

Technician:   QUINTON                                     

                                                     

MEASUREMENT RESULTS:                                       

Intervals:                                           

Rate:         106                                    

NC:           138                                    

QRSD:         88                                     

QT:           332                                    

QTc:          441                                    

Axis:                                                

P:            56                                     

NC:           138                                    

QRS:          36                                     

T:            12                                     

                                                     

INTERPRETIVE STATEMENTS:                                       

                                                     

Sinus tachycardia

Otherwise normal ECG

Compared to ECG 03/27/2018 14:36:24

Sinus rhythm no longer present

Sinus arrhythmia no longer present



Electronically Signed On 05-16-18 08:47:52 CDT by Yaron Donohue

## 2018-07-13 ENCOUNTER — HOSPITAL ENCOUNTER (EMERGENCY)
Dept: HOSPITAL 97 - ER | Age: 33
Discharge: HOME | End: 2018-07-13
Payer: COMMERCIAL

## 2018-07-13 VITALS — DIASTOLIC BLOOD PRESSURE: 94 MMHG | SYSTOLIC BLOOD PRESSURE: 118 MMHG | OXYGEN SATURATION: 95 %

## 2018-07-13 VITALS — TEMPERATURE: 98.6 F

## 2018-07-13 DIAGNOSIS — Z88.5: ICD-10-CM

## 2018-07-13 DIAGNOSIS — Z88.1: ICD-10-CM

## 2018-07-13 DIAGNOSIS — G80.9: ICD-10-CM

## 2018-07-13 DIAGNOSIS — R07.9: Primary | ICD-10-CM

## 2018-07-13 DIAGNOSIS — Z88.8: ICD-10-CM

## 2018-07-13 DIAGNOSIS — I10: ICD-10-CM

## 2018-07-13 DIAGNOSIS — Z88.6: ICD-10-CM

## 2018-07-13 DIAGNOSIS — Z88.3: ICD-10-CM

## 2018-07-13 PROCEDURE — 96372 THER/PROPH/DIAG INJ SC/IM: CPT

## 2018-07-13 PROCEDURE — 99285 EMERGENCY DEPT VISIT HI MDM: CPT

## 2018-07-13 PROCEDURE — 71045 X-RAY EXAM CHEST 1 VIEW: CPT

## 2018-07-13 NOTE — XMS REPORT
[facility]

 Created on:2018



Patient:Redd Dale

Sex:Male

:1985

External Reference #:[id number]





Demographics







 Address  [address] [place] [address]

 

 Phone  [phone]

 

 Preferred Language  en

 

 Marital Status  Unknown

 

 [employer] Affiliation  Unknown

 

 Race  Black or 

 

 Ethnic Group  Not  or 









Author







 Organization  [employer] [facility]









Care Team Providers







 Name  Role  Phone

 

 Knox, Na  Provider Role  Unavailable









Allergies

No Known Allergies



Problems







 Problem Type  Condition  Code  Onset Dates  Condition Status

 

 Problem  Nicotine dependence  F17.200    Active

 

 Problem  Lumbar spina bifida with  Q05.2    Active



   hydrocephalus      

 

 Problem  Dependence on wheelchair  Z99.3    Active

 

 Problem  Fecal incontinence  R15.9    Active

 

 Problem  Foot ulcer  L97.509    Active

 

 Problem  Depression with anxiety  F41.8    Active

 

 Problem  Paraplegia  G82.20    Active

 

 Problem  Constipation  K59.00    Active

 

 Problem  Incontinence of urine  R32    Active

 

 Problem  Nausea alone  R11.0    Active

 

 Problem  Episodic tension-type headache, not  G44.219    Active



   intractable      

 

 Problem  Nonintractable episodic headache,  R51    Active



   unspecified headache type      

 

 Problem   (ventriculoperitoneal) shunt  Z98.2    Active



   status      







Medications

No Known Medications



Results

No Known Results



Summary Purpose

[employer]

## 2018-07-13 NOTE — XMS REPORT
Patient Summary Document

 Created on:2018



Patient:JORDAN VERDIN

Sex:Male

:1985

External Reference #:[id number]





Demographics







 Address  [address]



   [place] [address]

 

 Home Phone  [phone]

 

 Work Phone  [phone]

 

 Email Address  [phone]

 

 Preferred Language  Unknown

 

 Marital Status  Unknown

 

 Religion Affiliation  Unknown

 

 Race  Unknown

 

 Additional Race(s)  Unavailable

 

 Ethnic Group  Unknown









Author







 Organization  [facility]

 

 Address  [address] [place] [address] [address]



   [place] [address]

 

 Phone  [phone]









Care Team Providers







 Name  Role  Phone

 

 RAMU TORRES  Unavailable  Unavailable









Problems

This patient has no known problems.



Allergies, Adverse Reactions, Alerts

This patient has no known allergies or adverse reactions.



Medications

This patient has no known medications.



Results







 Test Description  Test Time  Test Comments  Text Results  Atomic Results  
Result Comments









 BLOOD CULTURE  2017 16:22:00    









   Test Item  Value  Reference Range  Comments









 CULTURE (BEAKER) (test code=1095)  No growth in 5 days    



BLOOD [id number] 16:22:00





 Test Item  Value  Reference Range  Comments

 

 CULTURE (BEAKER) (test code=1095)  No growth in 5 days    



(MANUAL DIFFERENTIAL)2017 22:29:00





 Test Item  Value  Reference Range  Comments

 

 TOTAL COUNTED (BEAKER) (test code=1351)      

 

 WBC MORPHOLOGY (BEAKER) (test code=487)  Normal    

 

 PLT MORPHOLOGY (BEAKER) (test code=486)  Normal    

 

 RBC MORPHOLOGY (BEAKER) (test code=762)  Normal    



CBC W/PLT COUNT &amp; AUTO [id number] 22:28:00





 Test Item  Value  Reference Range  Comments

 

 WHITE BLOOD CELL COUNT (BEAKER) (test code=775)  7.3 K/ L  4.0-10.0  

 

 RED BLOOD CELL COUNT (BEAKER) (test code=761)  5.09 M/ L  4.20-5.80  

 

 HEMOGLOBIN (BEAKER) (test code=410)  13.0 GM/DL  13.0-16.8  

 

 HEMATOCRIT (BEAKER) (test code=411)  42.3 %  40.0-50.0  

 

 MEAN CORPUSCULAR VOLUME (BEAKER) (test code=753)  83.0 fL  82.0-98.0  

 

 MEAN CORPUSCULAR HEMOGLOBIN (BEAKER) (test  25.6 pg  27.0-33.0  



 code=751)      

 

 MEAN CORPUSCULAR HEMOGLOBIN CONC (BEAKER) (test  30.8 GM/DL  32.0-36.0  



 code=752)      

 

 RED CELL DISTRIBUTION WIDTH (BEAKER) (test  18.0 %  10.3-14.2  



 code=412)      

 

 PLATELET COUNT (BEAKER) (test code=756)  331 K/CU MM  150-430  

 

 MEAN PLATELET VOLUME (BEAKER) (test code=754)  8.5 fL  6.5-10.5  

 

 NUCLEATED RED BLOOD CELLS (BEAKER) (test  0 /100 WBC  0-0  



 code=413)      

 

 NEUTROPHILS RELATIVE PERCENT (BEAKER) (test  65 %    



 code=429)      

 

 LYMPHOCYTES RELATIVE PERCENT (BEAKER) (test  23 %    



 code=430)      

 

 MONOCYTES RELATIVE PERCENT (BEAKER) (test  8 %    



 code=431)      

 

 EOSINOPHILS RELATIVE PERCENT (BEAKER) (test  3 %    



 code=432)      

 

 BASOPHILS RELATIVE PERCENT (BEAKER) (test  1 %    



 code=437)      

 

 NEUTROPHILS ABSOLUTE COUNT (BEAKER) (test  4.75 K/ L  1.80-8.00  



 code=670)      

 

 LYMPHOCYTES ABSOLUTE COUNT (BEAKER) (test  1.68 K/ L  1.48-4.50  



 code=414)      

 

 MONOCYTES ABSOLUTE COUNT (BEAKER) (test  0.55 K/ L  0.00-1.30  



 code=415)      

 

 EOSINOPHILS ABSOLUTE COUNT (BEAKER) (test  0.24 K/ L  0.00-0.50  



 code=416)      

 

 BASOPHILS ABSOLUTE COUNT (BEAKER) (test  0.05 K/ L  0.00-0.20  



 code=417)      



[id number] METABOLIC [id number] 11:11:00





 Test Item  Value  Reference Range  Comments

 

 SODIUM (BEAKER) (test  138 meq/L  136-145  



 dobp=278)      

 

 POTASSIUM (BEAKER) (test  4.0 meq/L  3.5-5.1  



 code=379)      

 

 CHLORIDE (BEAKER) (test  108 meq/L    



 code=382)      

 

 CO2 (BEAKER) (test  23 meq/L  22-29  



 code=355)      

 

 BLOOD UREA NITROGEN  11 mg/dL  7-21  



 (BEAKER) (test code=354)      

 

 CREATININE (BEAKER) (test  0.74 mg/dL  0.57-1.25  



 code=358)      

 

 GLUCOSE RANDOM (BEAKER)  124 mg/dL    



 (test code=652)      

 

 CALCIUM (BEAKER) (test  8.9 mg/dL  8.4-10.2  



 code=697)      

 

 EGFR (BEAKER) (test  150 mL/min/1.73 sq m    ESTIMATED GFR IS NOT AS



 code=1092)      ACCURATE AS CREATININE



       CLEARANCE IN PREDICTING



       GLOMERULAR FILTRATION



       RATE. ESTIMATED GFR IS



       NOT APPLICABLE FOR



       DIALYSIS PATIENTS.



URINE [id number] 09:56:00





 Test Item  Value  Reference Range  Comments

 

 CULTURE (BEAKER) (test code=1095)  <10,000 col/mL skin jaime    



COMPREHENSIVE METABOLIC [id number] 08:49:00





 Test Item  Value  Reference Range  Comments

 

 TOTAL PROTEIN (BEAKER)  7.3 gm/dL  6.0-8.3  



 (test code=770)      

 

 ALBUMIN (BEAKER) (test  3.3 g/dL  3.5-5.0  



 code=1145)      

 

 ALKALINE PHOSPHATASE  89 U/L    



 (BEAKER) (test code=346)      

 

 BILIRUBIN TOTAL (BEAKER)  1.4 mg/dL  0.2-1.2  



 (test code=377)      

 

 SODIUM (BEAKER) (test  137 meq/L  136-145  



 jdzp=847)      

 

 POTASSIUM (BEAKER) (test  3.7 meq/L  3.5-5.1  



 code=379)      

 

 CHLORIDE (BEAKER) (test  108 meq/L    



 code=382)      

 

 CO2 (BEAKER) (test  17 meq/L  22-29  



 code=355)      

 

 BLOOD UREA NITROGEN  12 mg/dL  7-21  



 (BEAKER) (test code=354)      

 

 CREATININE (BEAKER) (test  0.78 mg/dL  0.57-1.25  



 code=358)      

 

 GLUCOSE RANDOM (BEAKER)  119 mg/dL    



 (test code=652)      

 

 CALCIUM (BEAKER) (test  8.6 mg/dL  8.4-10.2  



 code=697)      

 

 AST (SGOT) (BEAKER) (test  13 U/L  5-34  



 code=353)      

 

 ALT (SGPT) (BEAKER) (test  28 U/L  6-55  



 code=347)      

 

 EGFR (BEAKER) (test  141 mL/min/1.73 sq    ESTIMATED GFR IS NOT AS



 code=1092)  m    ACCURATE AS CREATININE



       CLEARANCE IN PREDICTING



       GLOMERULAR FILTRATION



       RATE. ESTIMATED GFR IS



       NOT APPLICABLE FOR



       DIALYSIS PATIENTS.



CBC W/PLT COUNT &amp; AUTO [id number] 08:46:00





 Test Item  Value  Reference Range  Comments

 

 WHITE BLOOD CELL COUNT (BEAKER) (test code=775)  9.4 K/ L  4.0-10.0  

 

 RED BLOOD CELL COUNT (BEAKER) (test code=761)  4.79 M/ L  4.20-5.80  

 

 HEMOGLOBIN (BEAKER) (test code=410)  12.8 GM/DL  13.0-16.8  

 

 HEMATOCRIT (BEAKER) (test code=411)  40.0 %  40.0-50.0  

 

 MEAN CORPUSCULAR VOLUME (BEAKER) (test code=753)  83.4 fL  82.0-98.0  

 

 MEAN CORPUSCULAR HEMOGLOBIN (BEAKER) (test  26.7 pg  27.0-33.0  



 code=751)      

 

 MEAN CORPUSCULAR HEMOGLOBIN CONC (BEAKER) (test  32.0 GM/DL  32.0-36.0  



 code=752)      

 

 RED CELL DISTRIBUTION WIDTH (BEAKER) (test  18.2 %  10.3-14.2  



 code=412)      

 

 PLATELET COUNT (BEAKER) (test code=756)  313 K/CU MM  150-430  

 

 MEAN PLATELET VOLUME (BEAKER) (test code=754)  8.6 fL  6.5-10.5  

 

 NUCLEATED RED BLOOD CELLS (BEAKER) (test  0 /100 WBC  0-0  



 code=413)      

 

 NEUTROPHILS RELATIVE PERCENT (BEAKER) (test  70 %    



 code=429)      

 

 LYMPHOCYTES RELATIVE PERCENT (BEAKER) (test  16 %    



 code=430)      

 

 MONOCYTES RELATIVE PERCENT (BEAKER) (test  12 %    



 code=431)      

 

 EOSINOPHILS RELATIVE PERCENT (BEAKER) (test  1 %    



 code=432)      

 

 BASOPHILS RELATIVE PERCENT (BEAKER) (test  1 %    



 code=437)      

 

 NEUTROPHILS ABSOLUTE COUNT (BEAKER) (test  6.54 K/ L  1.80-8.00  



 code=670)      

 

 LYMPHOCYTES ABSOLUTE COUNT (BEAKER) (test  1.50 K/ L  1.48-4.50  



 code=414)      

 

 MONOCYTES ABSOLUTE COUNT (BEAKER) (test  1.13 K/ L  0.00-1.30  



 code=415)      

 

 EOSINOPHILS ABSOLUTE COUNT (BEAKER) (test  0.13 K/ L  0.00-0.50  



 code=416)      

 

 BASOPHILS ABSOLUTE COUNT (BEAKER) (test  0.06 K/ L  0.00-0.20  



 code=417)      



0.00URINALYSIS W/ [id number] 20:36:00





 Test Item  Value  Reference Range  Comments

 

 COLOR (BEAKER) (test code=470)  Yellow    

 

 CLARITY (BEAKER) (test code=469)  Hazy    

 

 SPECIFIC GRAVITY UA (BEAKER) (test code=468)  1.012  1.001-1.035  

 

 PH UA (BEAKER) (test code=467)  5.5  5.0-8.0  

 

 PROTEIN UA (BEAKER) (test code=464)  100 mg/dL  Negative  

 

 GLUCOSE UA (BEAKER) (test code=365)  Negative  Negative  

 

 KETONES UA (BEAKER) (test code=371)  Negative  Negative  

 

 BILIRUBIN UA (BEAKER) (test code=462)  Negative  Negative  

 

 BLOOD UA (BEAKER) (test code=461)  Moderate  Negative  

 

 NITRITE UA (BEAKER) (test code=465)  Negative  Negative  

 

 LEUKOCYTE ESTERASE UA (BEAKER) (test code=466)  Large  Negative  

 

 UROBILINOGEN UA (BEAKER) (test code=463)  3.0 mg/dL  0.2-1.0  

 

 RBC UA (BEAKER) (test code=519)  19 /HPF    

 

 WBC UA (BEAKER) (test code=520)  182 /HPF    

 

 SOURCE(BEAKER) (test code=1291)      



BASIC METABOLIC [id number] 17:00:00





 Test Item  Value  Reference Range  Comments

 

 SODIUM (BEAKER) (test  140 meq/L  136-145  



 aonk=194)      

 

 POTASSIUM (BEAKER) (test  3.7 meq/L  3.5-5.1  



 code=379)      

 

 CHLORIDE (BEAKER) (test  112 meq/L    



 code=382)      

 

 CO2 (BEAKER) (test  17 meq/L  22-29  



 code=355)      

 

 BLOOD UREA NITROGEN  23 mg/dL  7-21  



 (BEAKER) (test code=354)      

 

 CREATININE (BEAKER) (test  1.00 mg/dL  0.57-1.25  



 code=358)      

 

 GLUCOSE RANDOM (BEAKER)  102 mg/dL    



 (test code=652)      

 

 CALCIUM (BEAKER) (test  8.7 mg/dL  8.4-10.2  



 code=697)      

 

 EGFR (BEAKER) (test  106 mL/min/1.73 sq m    ESTIMATED GFR IS NOT AS



 code=1092)      ACCURATE AS CREATININE



       CLEARANCE IN PREDICTING



       GLOMERULAR FILTRATION



       RATE. ESTIMATED GFR IS



       NOT APPLICABLE FOR



       DIALYSIS PATIENTS.



Specimen slightly ictericCBC W/PLT COUNT &amp; AUTO [id number] 12:18
:00





 Test Item  Value  Reference Range  Comments

 

 WHITE BLOOD CELL COUNT (BEAKER) (test code=775)  16.4 K/ L  4.0-10.0  

 

 RED BLOOD CELL COUNT (BEAKER) (test code=761)  5.22 M/ L  4.20-5.80  

 

 HEMOGLOBIN (BEAKER) (test code=410)  14.4 GM/DL  13.0-16.8  

 

 HEMATOCRIT (BEAKER) (test code=411)  42.9 %  40.0-50.0  

 

 MEAN CORPUSCULAR VOLUME (BEAKER) (test code=753)  82.2 fL  82.0-98.0  

 

 MEAN CORPUSCULAR HEMOGLOBIN (BEAKER) (test  27.5 pg  27.0-33.0  



 code=751)      

 

 MEAN CORPUSCULAR HEMOGLOBIN CONC (BEAKER) (test  33.5 GM/DL  32.0-36.0  



 code=752)      

 

 RED CELL DISTRIBUTION WIDTH (BEAKER) (test  16.2 %  10.3-14.2  



 code=412)      

 

 PLATELET COUNT (BEAKER) (test code=756)  329 K/CU MM  150-430  

 

 MEAN PLATELET VOLUME (BEAKER) (test code=754)  8.2 fL  6.5-10.5  

 

 NUCLEATED RED BLOOD CELLS (BEAKER) (test  0 /100 WBC  0-0  



 code=413)      

 

 NEUTROPHILS RELATIVE PERCENT (BEAKER) (test  83 %    



 code=429)      

 

 LYMPHOCYTES RELATIVE PERCENT (BEAKER) (test  7 %    



 code=430)      

 

 MONOCYTES RELATIVE PERCENT (BEAKER) (test  9 %    



 code=431)      

 

 EOSINOPHILS RELATIVE PERCENT (BEAKER) (test  0 %    



 code=432)      

 

 BASOPHILS RELATIVE PERCENT (BEAKER) (test  0 %    



 code=437)      

 

 NEUTROPHILS ABSOLUTE COUNT (BEAKER) (test  1.62 K/ L  1.80-8.00  



 code=670)      

 

 LYMPHOCYTES ABSOLUTE COUNT (BEAKER) (test  1.15 K/ L  1.48-4.50  



 code=414)      

 

 MONOCYTES ABSOLUTE COUNT (BEAKER) (test  1.56 K/ L  0.00-1.30  



 code=415)      

 

 EOSINOPHILS ABSOLUTE COUNT (BEAKER) (test  0.01 K/ L  0.00-0.50  



 code=416)      

 

 BASOPHILS ABSOLUTE COUNT (BEAKER) (test  0.02 K/ L  0.00-0.20  



 code=417)      



(MANUAL DIFFERENTIAL)2017 12:18:00





 Test Item  Value  Reference Range  Comments

 

 TOTAL COUNTED (BEAKER) (test code=1351)      

 

 WBC MORPHOLOGY (BEAKER) (test code=487)  Normal    

 

 PLT MORPHOLOGY (BEAKER) (test code=486)  Normal    

 

 RBC MORPHOLOGY (BEAKER) (test code=762)  Normal

## 2018-07-13 NOTE — XMS REPORT
[facility]

 Created on:2018



Patient:Redd Dale

Sex:Male

:1985

External Reference #:[id number]





Demographics







 Address  [address]  [place] [address]

 

 Phone  [phone]

 

 Preferred Language  en

 

 Marital Status  Unknown

 

 Jehovah's witness Affiliation  Unknown

 

 Race  Black or 

 

 Ethnic Group  Not  or 









Author







 Organization  [employer]









Care Team Providers







 Name  Role  Phone

 

 Knox, Na  Provider Role  Unavailable









Allergies, Adverse Reactions, Alerts







 Substance  Reaction  Event Type

 

 Toradol  Info Not Available  Drug Allergy

 

 Sulfa  Info Not Available  Drug Allergy

 

 Zofran  Info Not Available  Drug Allergy

 

 Morphine Sulfate ER  Info Not Available  Drug Allergy

 

 Levaquin  Info Not Available  Drug Allergy







Problems







 Problem Type  Condition  Code  Onset Dates  Condition Status

 

 Problem  Nicotine dependence  F17.200    Active

 

 Problem  Lumbar spina bifida with  Q05.2    Active



   hydrocephalus      

 

 Problem  Dependence on wheelchair  Z99.3    Active

 

 Problem  Fecal incontinence  R15.9    Active

 

 Problem  Foot ulcer  L97.509    Active

 

 Problem  Depression with anxiety  F41.8    Active

 

 Problem  Paraplegia  G82.20    Active

 

 Problem  Constipation  K59.00    Active

 

 Problem  Incontinence of urine  R32    Active

 

 Problem  Nausea alone  R11.0    Active

 

 Assessment  Episodic tension-type headache, not  G44.219    Active



   intractable      

 

 Assessment   (ventriculoperitoneal) shunt  Z98.2    Active



   status      

 

 Assessment  Ulcer of left foot, unspecified  L97.529    Active



   ulcer stage      

 

 Assessment  Nonintractable episodic headache,  R51    Active



   unspecified headache type      

 

 Assessment  Depression with anxiety  F41.8    Active

 

 Problem  Episodic tension-type headache, not  G44.219    Active



   intractable      

 

 Assessment  Lumbar spina bifida with  Q05.2    Active



   hydrocephalus      

 

 Problem  Nonintractable episodic headache,  R51    Active



   unspecified headache type      

 

 Assessment  Nausea and vomiting, intractability  R11.2    Active



   of vomiting not specified,      



   unspecified vomiting type      

 

 Problem   (ventriculoperitoneal) shunt  Z98.2    Active



   status      







Medications







 Medication  Code  Code  Instructions  Start  End  Status  Dosage



   System      Date  Date    

 

 Tylenol with  NDC  38776003764  300-60 MG      Active  1 tablet as



 Codeine #4      Orally every 6        needed



       hrs        

 

 Macrobid  NDC  [id number]  100 MG Orally      Active  1 capsule



       every 12 hrs        with food

 

 Pantoprazole  NDC  [id number]  40 MG Orally      Active  1 tablet



 Sodium      Once a day        

 

 Collagenase  NDC  [address]-85  250 UNIT/GM      Active  1 application



       Externally        to affected



       Once a day        area







Results

No Known Results



Summary Purpose

[employer] Submission

## 2018-07-13 NOTE — XMS REPORT
[facility]

 Created on:2018



Patient:Redd Dale

Sex:Male

:1985

External Reference #:[id number]





Demographics







 Address  [address] [place] [address]

 

 Phone  [phone]

 

 Preferred Language  en

 

 Marital Status  Unknown

 

 Zoroastrian Affiliation  Unknown

 

 Race  Black or 

 

 Ethnic Group  Not  or 









Author







 Organization  [employer]









Care Team Providers







 Name  Role  Phone

 

 Knox, Na  Provider Role  Unavailable









Allergies

No Known Allergies



Problems







 Problem Type  Condition  Code  Onset Dates  Condition Status

 

 Problem  Nicotine dependence  F17.200    Active

 

 Problem  Lumbar spina bifida with  Q05.2    Active



   hydrocephalus      

 

 Problem  Dependence on wheelchair  Z99.3    Active

 

 Problem  Fecal incontinence  R15.9    Active

 

 Problem  Foot ulcer  L97.509    Active

 

 Problem  Depression with anxiety  F41.8    Active

 

 Problem  Paraplegia  G82.20    Active

 

 Problem  Constipation  K59.00    Active

 

 Problem  Incontinence of urine  R32    Active

 

 Problem  Nausea alone  R11.0    Active

 

 Problem  Episodic tension-type headache, not  G44.219    Active



   intractable      

 

 Problem  Nonintractable episodic headache,  R51    Active



   unspecified headache type      

 

 Problem   (ventriculoperitoneal) shunt  Z98.2    Active



   status      







Medications

No Known Medications



Results

No Known Results



Summary Purpose

[employer] Submission

## 2018-07-13 NOTE — XMS REPORT
Clinical Summary

 Created on:2018



Patient:Redd Dale

Sex:Male

:1985

External Reference #:[id number]





Demographics







 Address  [address]



   [place] [address]

 

 Home Phone  [phone]

 

 Preferred Language  English

 

 Marital Status  Unknown

 

 Yazidism Affiliation  Unknown

 

 Race  Black or 

 

 Ethnic Group  Not  or 









Author







 Organization  [facility]

 

 Address  [address] [place] [address]

 

 Phone  [phone]









Support







 Name  Relationship  Address  Phone

 

 Unavailable  Unavailable  [address]  [phone]



     [place] [address]  









Care Team Providers







 Name  Role  Phone

 

 Unavailable  Primary Care Provider  Unavailable









Allergies







 Active Allergy  Reactions  Severity  Noted Date  Comments

 

 Sulfamethoxazole-Trimethoprim  Hives  High  2017  

 

 Levofloxacin  Hives  High  2017  

 

 Morphine  Hives  High  2017  

 

 Ketorolac  Rash  High  2017  

 

 Vancomycin Analogues  Rash  High  2017  

 

 Sesame Seed  Hives    2017  

 

 Ondansetron Hcl (Pf)  Nausea And Vomiting    2017  







Current Medications







 Prescription  Sig.  Disp.  Refills  Start Date  End Date  Status

 

 oxyCODONE-acetaminophen  Take 1 tablet by          Active



 (PERCOCET)  mg per  mouth every 4          



 tablet  (four) hours as          



   needed for Pain.          







Active Problems







 Problem  Noted Date

 

 Spina bifida (HCC)  2017

 

 Pyelonephritis  2017







Social History







 Tobacco Use  Types  Packs/Day  Years Used  Date

 

 Current Every Day Smoker  Cigarettes  0.5    









 Tobacco Cessation: Ready to Quit: No









 Sex Assigned at Birth  Date Recorded

 

 Not on file  







Last Filed Vital Signs

Not on file



Plan of Treatment

Not on file



Results

Not on fileafter 2017

## 2018-07-13 NOTE — EDPHYS
Physician Documentation                                                                           

 [facility]                                                                

Name: Redd Dale Jr                                                                            

Age: 32 yrs                                                                                       

Sex: Male                                                                                         

: 1985                                                                                   

MRN: [id number]                                                                                   

Arrival Date: 2018                                                                          

Time: 09:11                                                                                       

Account#: [id number]                                                                            

Bed 5                                                                                             

Private MD:                                                                                       

ED Physician Juan Luis Lerner                                                                       

HPI:                                                                                              

                                                                                             

17:05 This 32 yrs old Black Male presents to ER via EMS with complaints of Chest Pain.        kdr 

17:05 The patient or guardian reports chest pain that is located primarily in the substernal  kdr 

      area, anterior chest wall, left. The pain does not radiate. Associated signs and            

      symptoms: The patient has no apparent associated signs or symptoms. The chest pain is       

      described as sharp, stabbing. Duration: The patient or guardian reports a single            

      episode, that is still ongoing, and unchanged. Modifying factors: The symptoms are          

      alleviated by nothing. the symptoms are aggravated by breathing, cough, deep breath,        

      movement, palpation of area, twisting torso. Severity of pain: At its worst the pain        

      was moderate in the emergency department the pain is unchanged. The patient has not         

      experienced similar symptoms in the past. The patient has not recently seen a               

      physician. Was wheeling his wheelchair fast trying to make the bus when he heard/felt a     

      pop in his chest with immediate pain.                                                       

                                                                                                  

Historical:                                                                                       

- Allergies:                                                                                      

09:48 Amoxicillin;                                                                            ae1 

09:48 Bactrim;                                                                                ae1 

09:48 Ciprofloxacin;                                                                          ae1 

09:48 CLAVULANIC ACID;                                                                        ae1 

09:48 Doxycycline;                                                                            ae1 

09:48 Levofloxacin;                                                                           ae1 

09:48 Morphine;                                                                               ae1 

09:48 sulfamethoxazole;                                                                       ae1 

09:48 Toradol;                                                                                ae1 

09:48 TRIMETHOPRIM;                                                                           ae1 

09:48 Vancomycin;                                                                             ae1 

09:48 Zofran;                                                                                 ae1 

- Home Meds:                                                                                      

09:48 metoprolol tartrate 25 mg Oral tab 1 tab 2 times per day [Active]; pantoprazole 40 mg   ae1 

      Oral TbEC 1 tab once daily [Active]; ProMod Protein Oral liqd 30 mL twice a day             

      [Active]; Vitamin C 500 mg Oral tab twice a day [Active]; zinc sulfate 220 (50) mg Oral     

      cap daily [Active];                                                                         

- PMHx:                                                                                           

09:48 Asthma; Cerebral Palsy; cluster headaches; decubitus ulcers on feet; GERD;              ae1 

      Hydrocephalus; Hypertension; spina bifida;                                                  

                                                                                                  

- Immunization history:: Adult Immunizations up to date.                                          

- Social history:: Smoking status: Patient/guardian denies using tobacco.                         

- Ebola Screening: : Patient denies travel to an Ebola-affected area in the 21 days               

  before illness onset No symptoms or risks identified at this time.                              

                                                                                                  

                                                                                                  

ROS:                                                                                              

17:05 Constitutional: Negative for fever, chills, and weight loss, Eyes: Negative for injury, kdr 

      pain, redness, and discharge, ENT: Negative for injury, pain, and discharge, Neck:          

      Negative for injury, pain, and swelling, Respiratory: Negative for shortness of breath,     

      cough, wheezing, and pleuritic chest pain, Abdomen/GI: Negative for abdominal pain,         

      nausea, vomiting, diarrhea, and constipation, Back: Negative for injury and pain, :       

      Negative for injury, bleeding, discharge, and swelling, MS/Extremity: Negative for          

      injury or new deformity Skin: Negative for injury, rash, and discoloration, Neuro:          

      Negative for headache, weakness, numbness, tingling, and seizure activity.                  

17:05 Cardiovascular: Positive for chest pain, Negative for edema, orthopnea, palpitations,       

      paroxysmal nocturnal dyspnea, acute changes.                                                

                                                                                                  

Exam:                                                                                             

17:05 Constitutional:  This is a well developed, well nourished patient who is awake, alert,  kdr 

      and in no acute distress. Head/Face:  Normocephalic, atraumatic. Eyes:  Pupils equal        

      round and reactive to light, extra-ocular motions intact.  Lids and lashes normal.          

      Conjunctiva and sclera are non-icteric and not injected.  Cornea within normal limits.      

      Periorbital areas with no swelling, redness, or edema. Neck:  Trachea midline, no           

      thyromegaly or masses palpated, and no cervical lymphadenopathy.  Supple, full range of     

      motion without nuchal rigidity, or vertebral point tenderness.  No Meningismus.             

      Cardiovascular:  Regular rate and rhythm with a normal S1 and S2.  No gallops, murmurs,     

      or rubs.  Normal PMI, no JVD.  No pulse deficits. Respiratory:  Lungs have equal breath     

      sounds bilaterally, clear to auscultation and percussion.  No rales, rhonchi or wheezes     

      noted.  No increased work of breathing, no retractions or nasal flaring. Abdomen/GI:        

      Soft, non-tender, with normal bowel sounds.  No distension or tympany.  No guarding or      

      rebound.  No evidence of tenderness throughout.                                             

17:05 Chest/axilla: Inspection: normal, Palpation: tenderness, that is mild, that is              

      moderate, of the  mid-sternal area.                                                         

                                                                                                  

Vital Signs:                                                                                      

09:13  / 76; Pulse 106; Resp 18; Temp 98.6; Pulse Ox 95% on R/A;                        ae1 

10:00  / 83; Pulse 98; Resp 17; Pulse Ox 96% on R/A;                                    ae1 

11:05  / 94; Pulse 111; Resp 18; Pulse Ox 95% on R/A;                                   ae1 

                                                                                                  

MDM:                                                                                              

11:49 Patient medically screened.                                                             kdr 

17:05 Data reviewed: vital signs, nurses notes, lab test result(s), EKG. Counseling: I had a  kdr 

      detailed discussion with the patient and/or guardian regarding: the historical points,      

      exam findings, and any diagnostic results supporting the discharge/admit diagnosis, lab     

      results, radiology results, the need for outpatient follow up.                              

                                                                                                  

                                                                                             

10:00 Order name: CXR XRAY; Complete Time: 11:48                                              kdr 

                                                                                                  

Administered Medications:                                                                         

10:13 Drug: Demerol 50 mg Route: IM; Site: left deltoid;                                      ae1 

11:20 Follow up: Response: Pain is decreased                                                  ae1 

10:13 Drug: Phenergan 25 mg Route: IM; Site: right deltoid;                                   ae1 

15:17 Follow up: Response: No adverse reaction                                                ae1 

                                                                                                  

                                                                                                  

Disposition:                                                                                      

18 11:49 Discharged to Home. Impression: Chest pain on breathing, Chest pain,               

  unspecified.                                                                                    

- Condition is Stable.                                                                            

- Discharge Instructions: Chest Wall Pain, Easy-to-Read, Nonspecific Chest Pain,                  

  Easy-to-Read.                                                                                   

- Prescriptions for Robaxin 500 mg Oral Tablet - take 2 tablets by ORAL route every 6             

  hours As needed; 20 tablet. Tramadol 50 mg Oral Tablet - take 1 tablet by ORAL route            

  every 8 hours as needed; 12 tablet. Ibuprofen 800 mg Oral Tablet - take 1 tablet by             

  ORAL route every 8 hours As needed take with food; 30 tablet.                                   

- Medication Reconciliation Form, Thank You Letter form.                                          

- Follow up: Private Physician; When: 2 - 3 days; Reason: If symptoms return, Further             

  diagnostic work-up, Recheck today's complaints, Continuance of care, Re-evaluation by           

  your physician.                                                                                 

- Problem is new.                                                                                 

- Symptoms have improved.                                                                         

                                                                                                  

                                                                                                  

                                                                                                  

Signatures:                                                                                       

Dispatcher [employer]                           EDMS Lerner Juan Luis, MD                      MD   kdr                                                  

Trea Hernandez RN                     RN   ae1                                                  

                                                                                                  

Corrections: (The following items were deleted from the chart)                                    

12:16 11:49 2018 11:49 Discharged to Home. Impression: Chest pain on breathing; Chest   ae1 

      pain, unspecified. Condition is Stable. Forms are Medication Reconciliation Form, Thank     

      You Letter, Antibiotic Education, Prescription Opioid Use. Follow up: Private               

      Physician; When: 2 - 3 days; Reason: If symptoms return, Further diagnostic work-up,        

      Recheck today's complaints, Continuance of care, Re-evaluation by your physician.           

      Problem is new. Symptoms have improved. kdr                                                 

                                                                                                  

**************************************************************************************************

## 2018-07-13 NOTE — ER
Nurse's Notes                                                                                     

 [facility]                                                                

Name: Redd Dale Jr                                                                            

Age: 32 yrs                                                                                       

Sex: Male                                                                                         

: 1985                                                                                   

MRN: [id number]                                                                                   

Arrival Date: 2018                                                                          

Time: 09:11                                                                                       

Account#: [id number]                                                                            

Bed 5                                                                                             

Private MD:                                                                                       

Diagnosis: Chest pain on breathing;Chest pain, unspecified                                        

                                                                                                  

Presentation:                                                                                     

                                                                                             

09:12 Presenting complaint: EMS states: EMS states patient was wheeling himself in his        ae1 

      wheelchair when he felt chest pain to the left chest wall. Transition of care: patient      

      was not received from another setting of care. Onset of symptoms was 2018 at       

      08:30. Risk Assessment: Do you want to hurt yourself or someone else? Patient reports       

      no desire to harm self or others.                                                           

09:12 Method Of Arrival: EMS: [facility]                                                    ae1 

09:12 Acuity: AYESHA 3                                                                           ae1 

10:47 Initial Sepsis Screen: Does the patient meet any 2 criteria? No. Patient's initial      ae1 

      sepsis screen is negative. Does the patient have a suspected source of infection? No.       

      Patient's initial sepsis screen is negative. Care prior to arrival: None.                   

                                                                                                  

Triage Assessment:                                                                                

09:14 General: Appears in no apparent distress. comfortable, obese, Behavior is calm,         ae1 

      cooperative. Pain: Complains of pain in left clavicle, anterior aspect of left upper        

      chest and left breast. EENT: No signs and/or symptoms were reported regarding the EENT      

      system. Neuro: Level of Consciousness is awake, alert, obeys commands, Oriented to          

      person, place, time, situation. Cardiovascular: Patient's skin is warm and dry.             

      Respiratory: Airway is patent Respiratory effort is even, unlabored, Respiratory            

      pattern is regular, symmetrical. GI: Abdomen is round obese.                                

                                                                                                  

Historical:                                                                                       

- Allergies:                                                                                      

09:48 Amoxicillin;                                                                            ae1 

09:48 Bactrim;                                                                                ae1 

09:48 Ciprofloxacin;                                                                          ae1 

09:48 CLAVULANIC ACID;                                                                        ae1 

09:48 Doxycycline;                                                                            ae1 

09:48 Levofloxacin;                                                                           ae1 

09:48 Morphine;                                                                               ae1 

09:48 sulfamethoxazole;                                                                       ae1 

09:48 Toradol;                                                                                ae1 

09:48 TRIMETHOPRIM;                                                                           ae1 

09:48 Vancomycin;                                                                             ae1 

09:48 Zofran;                                                                                 ae1 

- Home Meds:                                                                                      

09:48 metoprolol tartrate 25 mg Oral tab 1 tab 2 times per day [Active]; pantoprazole 40 mg   ae1 

      Oral TbEC 1 tab once daily [Active]; ProMod Protein Oral liqd 30 mL twice a day             

      [Active]; Vitamin C 500 mg Oral tab twice a day [Active]; zinc sulfate 220 (50) mg Oral     

      cap daily [Active];                                                                         

- PMHx:                                                                                           

09:48 Asthma; Cerebral Palsy; cluster headaches; decubitus ulcers on feet; GERD;              ae1 

      Hydrocephalus; Hypertension; spina bifida;                                                  

                                                                                                  

- Immunization history:: Adult Immunizations up to date.                                          

- Social history:: Smoking status: Patient/guardian denies using tobacco.                         

- Ebola Screening: : Patient denies travel to an Ebola-affected area in the 21 days               

  before illness onset No symptoms or risks identified at this time.                              

                                                                                                  

                                                                                                  

Screenin:51 Abuse screen: Denies threats or abuse. Nutritional screening: No deficits noted.        ae1 

      Tuberculosis screening: No symptoms or risk factors identified. Fall Risk Fall in past      

      12 months (25 points). Secondary diagnosis (15 points) impaired mobility, No IV (0          

      pts). Ambulatory Aid- Crutches/Cane/Walker (15 pts). Gait-.                                 

                                                                                                  

Assessment:                                                                                       

09:46 Reassessment: Provider at bedside discussing plan of care.                              ae1 

09:49 Derm: Wound noted right foot and left foot, foot wounds are wrapped in gauze,           ae1 

      kerlix,Ace wrap and shoe covers.                                                            

10:47 Pain: Pain does not radiate. Pain began suddenly.                                       ae1 

                                                                                                  

Vital Signs:                                                                                      

09:13  / 76; Pulse 106; Resp 18; Temp 98.6; Pulse Ox 95% on R/A;                        ae1 

10:00  / 83; Pulse 98; Resp 17; Pulse Ox 96% on R/A;                                    ae1 

11:05  / 94; Pulse 111; Resp 18; Pulse Ox 95% on R/A;                                   ae1 

                                                                                                  

ED Course:                                                                                        

09:11 Patient arrived in ED.                                                                  ae1 

09:13 Triage completed.                                                                       ae1 

09:24 Juan Luis Lerner MD is Attending Physician.                                              kdr 

09:46 Tera Hernandez, RN is Primary Nurse.                                                   ae1 

09:50 Arm band placed on right wrist. EKG completed in triage. Results shown to MD.           ae1 

09:50 Bed in low position. Call light in reach. Side rails up X2. Cardiac monitor on. Pulse   ae1 

      ox on. NIBP on.                                                                             

10:31 CXR XRAY In Process Unspecified.                                                        EDMS

10:47 No provider procedures requiring assistance completed. Patient maintains SpO2           ae1 

      saturation greater than 95% on room air.                                                    

12:16 Patient did not have IV access during this emergency room visit.                        ae1 

                                                                                                  

Administered Medications:                                                                         

10:13 Drug: Demerol 50 mg Route: IM; Site: left deltoid;                                      ae1 

11:20 Follow up: Response: Pain is decreased                                                  ae1 

10:13 Drug: Phenergan 25 mg Route: IM; Site: right deltoid;                                   ae1 

15:17 Follow up: Response: No adverse reaction                                                ae1 

                                                                                                  

                                                                                                  

Output:                                                                                           

11:04 Urine: 400ml (Ortega); Total: 400ml.                                                     ae1 

                                                                                                  

Outcome:                                                                                          

11:49 Discharge ordered by MD.                                                                kdr 

12:16 Discharged to home via wheelchair.                                                      ae1 

12:16 Condition: stable                                                                           

12:16 Discharge instructions given to patient, Instructed on discharge instructions, follow       

      up and referral plans. medication usage, Demonstrated understanding of instructions,        

      Prescriptions given X 3.                                                                    

12:16 Patient left the ED.                                                                    ae1 

                                                                                                  

Signatures:                                                                                       

Dispatcher [employer]                           EDMS                                                 

Juan Luis Lerner MD MD   kdr                                                  

Tera Hernandez RN                     RN   ae1                                                  

                                                                                                  

Corrections: (The following items were deleted from the chart)                                    

09:49 09:14 Derm: Skin is normal, ae1                                                         ae1 

10:47 10:34 Blood Glucose: Blood Glucose Yeajfdf=926 mg/dL. ae1                               ae1 

                                                                                                  

**************************************************************************************************

## 2018-07-13 NOTE — RAD REPORT
EXAM DESCRIPTION:  [facility] Single [facility]7/13/2018 10:31 am

 

CLINICAL HISTORY:  Chest pain

 

COMPARISON:  May 2018

 

FINDINGS:   tubing overlies the chest

 

 The lungs appear clear of acute infiltrate. The heart is normal size

 

IMPRESSION:   No acute abnormalities displayed

## 2018-07-26 ENCOUNTER — HOSPITAL ENCOUNTER (EMERGENCY)
Dept: HOSPITAL 97 - ER | Age: 33
Discharge: HOME | End: 2018-07-26
Payer: COMMERCIAL

## 2018-07-26 VITALS — DIASTOLIC BLOOD PRESSURE: 76 MMHG | SYSTOLIC BLOOD PRESSURE: 115 MMHG | OXYGEN SATURATION: 97 %

## 2018-07-26 VITALS — TEMPERATURE: 98.7 F
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
## 2018-07-26 DIAGNOSIS — Z88.6: ICD-10-CM

## 2018-07-26 DIAGNOSIS — N39.0: Primary | ICD-10-CM
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
## 2018-07-26 DIAGNOSIS — I10: ICD-10-CM

## 2018-07-26 DIAGNOSIS — T83.098A: ICD-10-CM

## 2018-07-26 DIAGNOSIS — Z88.2: ICD-10-CM

## 2018-07-26 DIAGNOSIS — Z88.1: ICD-10-CM

## 2018-07-26 LAB
HCT VFR BLD CALC: 48.4 % (ref 39.6–49)
LYMPHOCYTES # SPEC AUTO: 1.4 K/UL (ref 0.7–4.9)
MCH RBC QN AUTO: 27.7 PG (ref 27–35)
MCV RBC: 82.5 FL (ref 80–100)
PMV BLD: 8.3 FL (ref 7.6–11.3)
RBC # BLD: 5.87 M/UL (ref 4.33–5.43)
UA COMPLETE W REFLEX CULTURE PNL UR: (no result)

## 2018-07-26 PROCEDURE — 81015 MICROSCOPIC EXAM OF URINE: CPT

## 2018-07-26 PROCEDURE — 87077 CULTURE AEROBIC IDENTIFY: CPT

## 2018-07-26 PROCEDURE — 81003 URINALYSIS AUTO W/O SCOPE: CPT

## 2018-07-26 PROCEDURE — 87088 URINE BACTERIA CULTURE: CPT

## 2018-07-26 PROCEDURE — 87186 SC STD MICRODIL/AGAR DIL: CPT

## 2018-07-26 PROCEDURE — 87086 URINE CULTURE/COLONY COUNT: CPT

## 2018-07-26 PROCEDURE — 85025 COMPLETE CBC W/AUTO DIFF WBC: CPT

## 2018-07-26 PROCEDURE — 99283 EMERGENCY DEPT VISIT LOW MDM: CPT

## 2018-07-26 PROCEDURE — 96372 THER/PROPH/DIAG INJ SC/IM: CPT

## 2018-07-26 PROCEDURE — 0T9B70Z DRAINAGE OF BLADDER WITH DRAINAGE DEVICE, VIA NATURAL OR ARTIFICIAL OPENING: ICD-10-PCS

## 2018-07-26 PROCEDURE — 36415 COLL VENOUS BLD VENIPUNCTURE: CPT

## 2018-07-26 NOTE — EDPHYS
Physician Documentation                                                                           

 [facility]                                                                

Name: Redd Dale Jr                                                                            

Age: 32 yrs                                                                                       

Sex: Male                                                                                         

: 1985                                                                                   

MRN: [id number]                                                                                   

Arrival Date: 2018                                                                          

Time: 10:57                                                                                       

Account#: [id number]                                                                            

Bed 19                                                                                            

Private MD: Camelia Knox                                                                              

ED Physician Juan Luis Lerner                                                                       

HPI:                                                                                              

                                                                                             

11:14 This 32 yrs old Black Male presents to ER via Ambulatory with complaints of Needs       kav 

      Urinary Catheter Replacement.                                                               

11:20 The patient presents with a Contreras catheter problem, "...needs changed out and cloudy    kav 

      urine", flank pain, described as dull, waxing/waning, of the left mid back and right        

      mid back, that does not radiate. Onset: The symptoms/episode began/occurred acutely.        

11:22 Onset: The symptoms/episode began/occurred acutely, 2 day(s) ago. Modifying factors:    kav 

      The symptoms are alleviated by nothing, the symptoms are aggravated by nothing.             

      Severity of symptoms: At their worst the symptoms were severe, a " 7" out of "10".          

11:23 The patient has experienced similar episodes in the past, chronically. The patient has  kav 

      not recently seen a physician.                                                              

                                                                                                  

Historical:                                                                                       

- Allergies:                                                                                      

11:03 Amoxicillin;                                                                            hj  

11:03 Bactrim;                                                                                hj  

11:03 Ciprofloxacin;                                                                          hj  

11:03 CLAVULANIC ACID;                                                                        hj  

11:03 Doxycycline;                                                                            hj  

11:03 Levofloxacin;                                                                           hj  

11:03 Morphine;                                                                               hj  

11:03 sulfamethoxazole;                                                                       hj  

11:03 Toradol;                                                                                hj  

11:03 TRIMETHOPRIM;                                                                           hj  

11:03 Vancomycin;                                                                             hj  

11:03 Zofran;                                                                                 hj  

- Home Meds:                                                                                      

11:03 metoprolol tartrate 25 mg Oral tab 1 tab 2 times per day [Active]; pantoprazole 40 mg   hj  

      Oral TbEC 1 tab once daily [Active]; ProMod Protein Oral liqd 30 mL twice a day             

      [Active]; Vitamin C 500 mg Oral tab twice a day [Active]; zinc sulfate 220 (50) mg Oral     

      cap daily [Active];                                                                         

- PMHx:                                                                                           

11:03 Asthma; Cerebral Palsy; cluster headaches; decubitus ulcers on feet; GERD;              hj  

      Hydrocephalus; Hypertension; spina bifida;                                                  

- PSHx:                                                                                           

11:03 Unable to obtain;                                                                       hj  

                                                                                                  

- Immunization history:: Adult Immunizations up to date.                                          

- Social history:: Smoking status: Patient/guardian denies using tobacco,                         

  Patient/guardian denies using alcohol.                                                          

- Ebola Screening: : Patient negative for fever greater than or equal to 101.5 degrees            

  Fahrenheit, and additional compatible Ebola Virus Disease symptoms Patient denies               

  exposure to infectious person Patient denies travel to an Ebola-affected area in the            

  21 days before illness onset.                                                                   

- Family history:: not pertinent.                                                                 

- Hospitalizations: : No recent hospitalization is reported.                                      

                                                                                                  

                                                                                                  

ROS:                                                                                              

11:26 Constitutional: Negative for fever, chills, and weight loss, Eyes: Negative for injury, kav 

      pain, redness, and discharge, ENT: Negative for injury, pain, and discharge, Neck:          

      Negative for injury, pain, and swelling, Cardiovascular: Negative for chest pain,           

      palpitations, and edema, Respiratory: Negative for shortness of breath, cough,              

      wheezing, and pleuritic chest pain, Abdomen/GI: Negative for abdominal pain, nausea,        

      vomiting, diarrhea, and constipation, Back: Negative for injury and pain, MS/Extremity:     

      Negative for injury and deformity, Skin: Negative for injury, rash, and discoloration,      

      Neuro: Negative for headache, weakness, numbness, tingling, and seizure, Psych:             

      Negative for depression, anxiety, suicide ideation, homicidal ideation, and                 

      hallucinations, Allergy/Immunology: Negative for hives, rash, and allergies, Endocrine:     

      Negative for neck swelling, polydipsia, polyuria, polyphagia, and marked weight             

      changes, Hematologic/Lymphatic: Negative for swollen nodes, abnormal bleeding, and          

      unusual bruising.                                                                           

11:26 : Positive for flank pain.                                                                

                                                                                                  

Exam:                                                                                             

11:26 Constitutional:  This is a well developed, well nourished patient who is awake, alert,  kav 

      and in no acute distress. Head/Face:  Normocephalic, atraumatic. Eyes:  Pupils equal        

      round and reactive to light, extra-ocular motions intact.  Lids and lashes normal.          

      Conjunctiva and sclera are non-icteric and not injected.  Cornea within normal limits.      

      Periorbital areas with no swelling, redness, or edema. ENT:  Nares patent. No nasal         

      discharge, no septal abnormalities noted.  Tympanic membranes are normal and external       

      auditory canals are clear.  Oropharynx with no redness, swelling, or masses, exudates,      

      or evidence of obstruction, uvula midline.  Mucous membranes moist. Neck:  Trachea          

      midline, no thyromegaly or masses palpated, and no cervical lymphadenopathy.  Supple,       

      full range of motion without nuchal rigidity, or vertebral point tenderness.  No            

      Meningismus. Chest/axilla:  Normal chest wall appearance and motion.  Nontender with no     

      deformity.  No lesions are appreciated. Cardiovascular:  Regular rate and rhythm with a     

      normal S1 and S2.  No gallops, murmurs, or rubs.  Normal PMI, no JVD.  No pulse             

      deficits. Respiratory:  Lungs have equal breath sounds bilaterally, clear to                

      auscultation and percussion.  No rales, rhonchi or wheezes noted.  No increased work of     

      breathing, no retractions or nasal flaring. Abdomen/GI:  Soft, non-tender, with normal      

      bowel sounds.  No distension or tympany.  No guarding or rebound.  No evidence of           

      tenderness throughout. Back:  No spinal tenderness.  No costovertebral tenderness.          

      Full range of motion. Skin:  Warm, dry with normal turgor.  Normal color with no            

      rashes, no lesions, and no evidence of cellulitis. MS/ Extremity:  Pulses equal, no         

      cyanosis.  Neurovascular intact.  Full, normal range of motion. Neuro:  Awake and           

      alert, GCS 15, oriented to person, place, time, and situation.  Cranial nerves II-XII       

      grossly intact.  Motor strength 5/5 in all extremities.  Sensory grossly intact.            

      Cerebellar exam normal.  Normal gait. Psych:  Awake, alert, with orientation to person,     

      place and time.  Behavior, mood, and affect are within normal limits.                       

11:26 : CVA tenderness, noted bilaterally, a contreras is noted.                                    

                                                                                                  

Vital Signs:                                                                                      

11:03  / 88; Pulse 103; Resp 18; Temp 98.7(TE); Pulse Ox 98% on R/A; Weight 131.54 kg;    

      Height 4 ft. 11 in. (149.86 cm); Pain 8/10;                                                 

12:16  / 76 Sitting; Pulse 96; Resp 18; Pulse Ox 97% on R/A;                            mh5 

11:03 Body Mass Index 58.57 (131.54 kg, 149.86 cm)                                              

                                                                                                  

Procedures:                                                                                       

12:41 Performed Contreras Catheter replacement 16 fr suprapubic.                                  [facility] 

                                                                                                  

MDM:                                                                                              

11:13 Medical screening is not applicable.                                                    [facility] 

12:41 Data reviewed: vital signs, nurses notes, lab test result(s), CBC, urinalysis.          [facility] 

                                                                                                  

                                                                                             

11:16 Order name: CBC with Diff; Complete Time: 12:06                                         [facility] 

                                                                                             

12:37 Order name: Urine Culture                                                               [facility] 

                                                                                             

12:37 Order name: Urine Microscopic Only                                                      [facility] 

                                                                                             

12:38 Order name: Urine Dipstick--Ancillary (enter results)                                     

                                                                                             

11:16 Order name: Urine Dipstick-Ancillary (obtain specimen); Complete Time: 12:35            [facility] 

                                                                                             

12:35 Interpretation: Abnormal.                                                               kav 

                                                                                                  

Administered Medications:                                                                         

11:34 CANCELLED (Other Intervention Used): fentaNYL (PF) 25 mcg IVP once                      ed1 

11:51 Drug: fentaNYL (PF) 25 mcg Route: IM; Site: right deltoid;                              ed1 

13:37 Follow up: Response: No adverse reaction                                                aj1 

12:59 Drug: Rocephin (cefTRIAXone) 1 grams Route: IM; Site: right gluteus;                    aj1 

13:37 Follow up: Response: No adverse reaction                                                aj1 

13:01 Not Given (Physician Discretion): NS 0.9% 1000 ml IV at 1000 ml once                    aj1 

                                                                                                  

                                                                                                  

Disposition:                                                                                      

15:35 Co-signature as Attending Physician, Juan Luis Lerner MD I agree with the assessment and   kdr 

      plan of care.                                                                               

                                                                                                  

Disposition:                                                                                      

18 12:36 Discharged to Home. Impression: Urinary tract infection, site not specified.       

- Condition is Stable.                                                                            

                                                                                                  

- Prescriptions for Zithromax Z- Feng 250 mg Oral Tablet - take 1 tablet by ORAL route             

  as directed for 5 days Day 1 - take two (2) tablets one time. Day 2, 3, 4 , 5 take              

  one (1) tablet once daily.; 6 tablet.                                                           

- Medication Reconciliation Form, Thank You Letter, Antibiotic Education form.                    

- Follow up: Camelia Knox MD; When: 2 - 3 days; Reason: Recheck today's complaints,                  

  Continuance of care, Re-evaluation by your physician.                                           

- Problem is new.                                                                                 

- Symptoms have improved.                                                                         

- Notes: ensure adequate hydation                                                                 

                                                                                                  

                                                                                                  

Signatures:                                                                                       

Dispatcher [employer]                           EDMS                                                 

Heather Perez RN                     RN   aj1                                                  

Juan Luis Lerner MD MD kdr Vern, Katherine, FNP                    FNP  kav                                                  

Xiao King, LVN                       LVN  ed1                                                  

Dale Nathan RN                      YADIEL   hj                                                   

                                                                                                  

Corrections: (The following items were deleted from the chart)                                    

11:34 11:20 fentaNYL (PF) 25 mcg IVP once ordered. kav                                        ed1 

11:52 11:27 Contreras ordered. kav                                                                ed1 

12:03 11:16 COMPREHENSIVE METABOLIC PANEL+C.LAB.BRZ ordered. EDMS                             EDMS

13:38 12:36 2018 12:36 Discharged to Home. Impression: Urinary tract infection, site    aj1 

      not specified. Condition is Stable. Forms are Medication Reconciliation Form, Thank You     

      Letter, Antibiotic Education, Prescription Opioid Use. Follow up: Camelia Knox; When: 2 - 3      

      days; Reason: Recheck today's complaints, Continuance of care, Re-evaluation by your        

      physician. Problem is new. Symptoms have improved. ka                                      

                                                                                                  

**************************************************************************************************

## 2018-07-26 NOTE — XMS REPORT
[facility]

 Created on:2018



Patient:Redd Dale

Sex:Male

:1985

External Reference #:[id number]





Demographics







 Address  [address] [place] [address]

 

 Phone  [phone]

 

 Preferred Language  en

 

 Marital Status  Unknown

 

 Christian Affiliation  Unknown

 

 Race  Black or 

 

 Ethnic Group  Not  or 









Author







 Organization  [employer]









Care Team Providers







 Name  Role  Phone

 

 Knox, Na  Provider Role  Unavailable









Allergies

No Known Allergies



Problems







 Problem Type  Condition  Code  Onset Dates  Condition Status

 

 Problem  Nicotine dependence  F17.200    Active

 

 Problem  Lumbar spina bifida with  Q05.2    Active



   hydrocephalus      

 

 Problem  Dependence on wheelchair  Z99.3    Active

 

 Problem  Fecal incontinence  R15.9    Active

 

 Problem  Foot ulcer  L97.509    Active

 

 Problem  Depression with anxiety  F41.8    Active

 

 Problem  Paraplegia  G82.20    Active

 

 Problem  Constipation  K59.00    Active

 

 Problem  Incontinence of urine  R32    Active

 

 Problem  Nausea alone  R11.0    Active

 

 Problem  Episodic tension-type headache, not  G44.219    Active



   intractable      

 

 Problem  Nonintractable episodic headache,  R51    Active



   unspecified headache type      

 

 Problem   (ventriculoperitoneal) shunt  Z98.2    Active



   status      







Medications

No Known Medications



Results

No Known Results



Summary Purpose

[employer] Submission

## 2018-07-26 NOTE — XMS REPORT
[facility]

 Created on:2018



Patient:Redd Dale

Sex:Male

:1985

External Reference #:[id number]





Demographics







 Address  [address]  [place] [address]

 

 Phone  [phone]

 

 Preferred Language  en

 

 Marital Status  Unknown

 

 Lutheran Affiliation  Unknown

 

 Race  Black or 

 

 Ethnic Group  Unknown









Author







 Organization  [employer]









Care Team Providers







 Name  Role  Phone

 

 Knox, Na  Provider Role  Unavailable









Allergies, Adverse Reactions, Alerts







 Substance  Reaction  Event Type

 

 Zofran  Info Not Available  Drug Allergy

 

 Morphine Sulfate ER  Info Not Available  Drug Allergy

 

 Levaquin  Info Not Available  Drug Allergy







Problems







 Problem Type  Condition  Code  Onset Dates  Condition Status

 

 Assessment  Mental disorder, not otherwise  F99    Active



   specified      

 

 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



   disorder      

 

 Assessment  Ulcer of left foot, unspecified  L97.529    Active



   ulcer stage      

 

 Problem  Foot ulcer  L97.509    Active

 

 Assessment  Nonintractable episodic headache,  R51    Active



   unspecified headache type      

 

 Problem  Constipation  K59.00    Active

 

 Assessment  Episodic tension-type headache, not  G44.219    Active



   intractable      

 

 Problem  Paraplegia  G82.20    Active

 

 Problem  Incontinence of urine  R32    Active

 

 Problem  Nausea alone  R11.0    Active

 

 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



   disorder      

 

 Problem  Mental disorder, not otherwise  F99    Active



   specified      

 

 Assessment  Bipolar depression  F31.30    Active

 

 Assessment  Lumbar spina bifida with  Q05.2    Active



   hydrocephalus      

 

 Problem  Bipolar depression  F31.30    Active

 

 Assessment   (ventriculoperitoneal) shunt  Z98.2    Active



   status      

 

 Problem  Depression with anxiety  F41.8    Active

 

 Problem  Fecal incontinence  R15.9    Active

 

 Problem  Nausea and vomiting, intractability  R11.2    Active



   of vomiting not specified,      



   unspecified vomiting type      

 

 Problem  Ulcer of left foot, unspecified  L97.529    Active



   ulcer stage      

 

 Problem  Episodic tension-type headache, not  G44.219    Active



   intractable      

 

 Problem  Nonintractable episodic headache,  R51    Active



   unspecified headache type      

 

 Assessment  Depression with anxiety  F41.8    Active

 

 Problem  Dependence on wheelchair  Z99.3    Active

 

 Problem  Lumbar spina bifida with  Q05.2    Active



   hydrocephalus      

 

 Problem   (ventriculoperitoneal) shunt  Z98.2    Active



   status      

 

 Problem  Nicotine dependence  F17.200    Active







Medications







 Medication  Code  Code  Instructions  Start  End  Status  Dosage



   System      Date  Date    

 

 Collagenase  NDC  [address]-95  250 UNIT/GM      Active  1 application



       Externally        to affected



       Once a day        area

 

 Macrobid  NDC  99241841113  100 MG Orally      Active  1 capsule



       every 12 hrs        with food

 

 Tylenol with  NDC  73259681537  300-60 MG      Active  1 tablet as



 Codeine #4      Orally every 6        needed



       hrs        

 

 Citalopram  NDC  72419775701  10 MG Orally  July    Active  1 tablet



 Hydrobromide      Once a day  2018      

 

 Pantoprazole  NDC  81181796053  40 MG Orally      Active  1 tablet



 Sodium      Once a day        

 

 Seroquel  NDC  88482765818  25 MG Orally  July    Active  1 tablet



       Once a day at  16,      



       bedtime        







Results

No Known Results



Summary Purpose

[employer] Submission

## 2018-07-26 NOTE — XMS REPORT
[facility]

 Created on:2018



Patient:Redd Dale

Sex:Male

:1985

External Reference #:[id number]





Demographics







 Address  [address] [place] [address]

 

 Phone  [phone]

 

 Preferred Language  en

 

 Marital Status  Unknown

 

 Denominational Affiliation  Unknown

 

 Race  Black or 

 

 Ethnic Group  Not  or 









Author







 Organization  [employer]









Care Team Providers







 Name  Role  Phone

 

 Knox, Na  Provider Role  Unavailable









Allergies

No Known Allergies



Problems







 Problem Type  Condition  Code  Onset Dates  Condition Status

 

 Problem  Nicotine dependence  F17.200    Active

 

 Problem  Lumbar spina bifida with  Q05.2    Active



   hydrocephalus      

 

 Problem  Dependence on wheelchair  Z99.3    Active

 

 Problem  Fecal incontinence  R15.9    Active

 

 Problem  Foot ulcer  L97.509    Active

 

 Problem  Depression with anxiety  F41.8    Active

 

 Problem  Paraplegia  G82.20    Active

 

 Problem  Constipation  K59.00    Active

 

 Problem  Incontinence of urine  R32    Active

 

 Problem  Nausea alone  R11.0    Active

 

 Problem  Episodic tension-type headache, not  G44.219    Active



   intractable      

 

 Problem  Nonintractable episodic headache,  R51    Active



   unspecified headache type      

 

 Problem   (ventriculoperitoneal) shunt  Z98.2    Active



   status      







Medications

No Known Medications



Results

No Known Results



Summary Purpose

[employer]mission

## 2018-07-26 NOTE — XMS REPORT
Patient Summary Document

 Created on:2018



Patient:JORDAN VERDIN

Sex:Male

:1985

External Reference #:[id number]





Demographics







 Address  [address]



   [place] [address]

 

 Home Phone  [phone]

 

 Preferred Language  Unknown

 

 Marital Status  Unknown

 

 [employer] Affiliation  Unknown

 

 Race  Unknown

 

 Additional Race(s)  Unavailable

 

 Ethnic Group  Unknown









Author







 Organization  [facility]

 

 Address  [facility] [address] [place] [address]

 

 Phone  [phone]









Care Team Providers







 Name  Role  Phone

 

 MELISSARAMU  Unavailable  Unavailable









Problems

This patient has no known problems.



Allergies, Adverse Reactions, Alerts

This patient has no known allergies or adverse reactions.



Medications

This patient has no known medications.



Results







 Test Description  Test Time  Test Comments  Text Results  Atomic Results  
Result Comments









 BLOOD CULTURE  2017 16:22:00    









   Test Item  Value  Reference Range  Comments









 CULTURE (BEAKER) (test code=1095)  No growth in 5 days    



BLOOD [id number] 16:22:00





 Test Item  Value  Reference Range  Comments

 

 CULTURE (BEAKER) (test code=1095)  No growth in 5 days    



(MANUAL DIFFERENTIAL)2017 22:29:00





 Test Item  Value  Reference Range  Comments

 

 TOTAL COUNTED (BEAKER) (test code=1351)      

 

 WBC MORPHOLOGY (BEAKER) (test code=487)  Normal    

 

 PLT MORPHOLOGY (BEAKER) (test code=486)  Normal    

 

 RBC MORPHOLOGY (BEAKER) (test code=762)  Normal    



CBC W/PLT COUNT &amp; AUTO [id number] 22:28:00





 Test Item  Value  Reference Range  Comments

 

 WHITE BLOOD CELL COUNT (BEAKER) (test code=775)  7.3 K/ L  4.0-10.0  

 

 RED BLOOD CELL COUNT (BEAKER) (test code=761)  5.09 M/ L  4.20-5.80  

 

 HEMOGLOBIN (BEAKER) (test code=410)  13.0 GM/DL  13.0-16.8  

 

 HEMATOCRIT (BEAKER) (test code=411)  42.3 %  40.0-50.0  

 

 MEAN CORPUSCULAR VOLUME (BEAKER) (test code=753)  83.0 fL  82.0-98.0  

 

 MEAN CORPUSCULAR HEMOGLOBIN (BEAKER) (test  25.6 pg  27.0-33.0  



 code=751)      

 

 MEAN CORPUSCULAR HEMOGLOBIN CONC (BEAKER) (test  30.8 GM/DL  32.0-36.0  



 code=752)      

 

 RED CELL DISTRIBUTION WIDTH (BEAKER) (test  18.0 %  10.3-14.2  



 code=412)      

 

 PLATELET COUNT (BEAKER) (test code=756)  331 K/CU MM  150-430  

 

 MEAN PLATELET VOLUME (BEAKER) (test code=754)  8.5 fL  6.5-10.5  

 

 NUCLEATED RED BLOOD CELLS (BEAKER) (test  0 /100 WBC  0-0  



 code=413)      

 

 NEUTROPHILS RELATIVE PERCENT (BEAKER) (test  65 %    



 code=429)      

 

 LYMPHOCYTES RELATIVE PERCENT (BEAKER) (test  23 %    



 code=430)      

 

 MONOCYTES RELATIVE PERCENT (BEAKER) (test  8 %    



 code=431)      

 

 EOSINOPHILS RELATIVE PERCENT (BEAKER) (test  3 %    



 code=432)      

 

 BASOPHILS RELATIVE PERCENT (BEAKER) (test  1 %    



 code=437)      

 

 NEUTROPHILS ABSOLUTE COUNT (BEAKER) (test  4.75 K/ L  1.80-8.00  



 code=670)      

 

 LYMPHOCYTES ABSOLUTE COUNT (BEAKER) (test  1.68 K/ L  1.48-4.50  



 code=414)      

 

 MONOCYTES ABSOLUTE COUNT (BEAKER) (test  0.55 K/ L  0.00-1.30  



 code=415)      

 

 EOSINOPHILS ABSOLUTE COUNT (BEAKER) (test  0.24 K/ L  0.00-0.50  



 code=416)      

 

 BASOPHILS ABSOLUTE COUNT (BEAKER) (test  0.05 K/ L  0.00-0.20  



 code=417)      



[id number] METABOLIC [id number] 11:11:00





 Test Item  Value  Reference Range  Comments

 

 SODIUM (BEAKER) (test  138 meq/L  136-145  



 gagf=129)      

 

 POTASSIUM (BEAKER) (test  4.0 meq/L  3.5-5.1  



 code=379)      

 

 CHLORIDE (BEAKER) (test  108 meq/L    



 code=382)      

 

 CO2 (BEAKER) (test  23 meq/L  22-29  



 code=355)      

 

 BLOOD UREA NITROGEN  11 mg/dL  7-21  



 (BEAKER) (test code=354)      

 

 CREATININE (BEAKER) (test  0.74 mg/dL  0.57-1.25  



 code=358)      

 

 GLUCOSE RANDOM (BEAKER)  124 mg/dL    



 (test code=652)      

 

 CALCIUM (BEAKER) (test  8.9 mg/dL  8.4-10.2  



 code=697)      

 

 EGFR (BEAKER) (test  150 mL/min/1.73 sq m    ESTIMATED GFR IS NOT AS



 code=1092)      ACCURATE AS CREATININE



       CLEARANCE IN PREDICTING



       GLOMERULAR FILTRATION



       RATE. ESTIMATED GFR IS



       NOT APPLICABLE FOR



       DIALYSIS PATIENTS.



URINE [id number] 09:56:00





 Test Item  Value  Reference Range  Comments

 

 CULTURE (BEAKER) (test code=1095)  <10,000 col/mL skin jaime    



COMPREHENSIVE METABOLIC [id number] 08:49:00





 Test Item  Value  Reference Range  Comments

 

 TOTAL PROTEIN (BEAKER)  7.3 gm/dL  6.0-8.3  



 (test code=770)      

 

 ALBUMIN (BEAKER) (test  3.3 g/dL  3.5-5.0  



 code=1145)      

 

 ALKALINE PHOSPHATASE  89 U/L    



 (BEAKER) (test code=346)      

 

 BILIRUBIN TOTAL (BEAKER)  1.4 mg/dL  0.2-1.2  



 (test code=377)      

 

 SODIUM (BEAKER) (test  137 meq/L  136-145  



 ytuk=116)      

 

 POTASSIUM (BEAKER) (test  3.7 meq/L  3.5-5.1  



 code=379)      

 

 CHLORIDE (BEAKER) (test  108 meq/L    



 code=382)      

 

 CO2 (BEAKER) (test  17 meq/L  22-29  



 code=355)      

 

 BLOOD UREA NITROGEN  12 mg/dL  7-21  



 (BEAKER) (test code=354)      

 

 CREATININE (BEAKER) (test  0.78 mg/dL  0.57-1.25  



 code=358)      

 

 GLUCOSE RANDOM (BEAKER)  119 mg/dL    



 (test code=652)      

 

 CALCIUM (BEAKER) (test  8.6 mg/dL  8.4-10.2  



 code=697)      

 

 AST (SGOT) (BEAKER) (test  13 U/L  5-34  



 code=353)      

 

 ALT (SGPT) (BEAKER) (test  28 U/L  6-55  



 code=347)      

 

 EGFR (BEAKER) (test  141 mL/min/1.73 sq    ESTIMATED GFR IS NOT AS



 code=1092)  m    ACCURATE AS CREATININE



       CLEARANCE IN PREDICTING



       GLOMERULAR FILTRATION



       RATE. ESTIMATED GFR IS



       NOT APPLICABLE FOR



       DIALYSIS PATIENTS.



CBC W/PLT COUNT &amp; AUTO [id number] 08:46:00





 Test Item  Value  Reference Range  Comments

 

 WHITE BLOOD CELL COUNT (BEAKER) (test code=775)  9.4 K/ L  4.0-10.0  

 

 RED BLOOD CELL COUNT (BEAKER) (test code=761)  4.79 M/ L  4.20-5.80  

 

 HEMOGLOBIN (BEAKER) (test code=410)  12.8 GM/DL  13.0-16.8  

 

 HEMATOCRIT (BEAKER) (test code=411)  40.0 %  40.0-50.0  

 

 MEAN CORPUSCULAR VOLUME (BEAKER) (test code=753)  83.4 fL  82.0-98.0  

 

 MEAN CORPUSCULAR HEMOGLOBIN (BEAKER) (test  26.7 pg  27.0-33.0  



 code=751)      

 

 MEAN CORPUSCULAR HEMOGLOBIN CONC (BEAKER) (test  32.0 GM/DL  32.0-36.0  



 code=752)      

 

 RED CELL DISTRIBUTION WIDTH (BEAKER) (test  18.2 %  10.3-14.2  



 code=412)      

 

 PLATELET COUNT (BEAKER) (test code=756)  313 K/CU MM  150-430  

 

 MEAN PLATELET VOLUME (BEAKER) (test code=754)  8.6 fL  6.5-10.5  

 

 NUCLEATED RED BLOOD CELLS (BEAKER) (test  0 /100 WBC  0-0  



 code=413)      

 

 NEUTROPHILS RELATIVE PERCENT (BEAKER) (test  70 %    



 code=429)      

 

 LYMPHOCYTES RELATIVE PERCENT (BEAKER) (test  16 %    



 code=430)      

 

 MONOCYTES RELATIVE PERCENT (BEAKER) (test  12 %    



 code=431)      

 

 EOSINOPHILS RELATIVE PERCENT (BEAKER) (test  1 %    



 code=432)      

 

 BASOPHILS RELATIVE PERCENT (BEAKER) (test  1 %    



 code=437)      

 

 NEUTROPHILS ABSOLUTE COUNT (BEAKER) (test  6.54 K/ L  1.80-8.00  



 code=670)      

 

 LYMPHOCYTES ABSOLUTE COUNT (BEAKER) (test  1.50 K/ L  1.48-4.50  



 code=414)      

 

 MONOCYTES ABSOLUTE COUNT (BEAKER) (test  1.13 K/ L  0.00-1.30  



 code=415)      

 

 EOSINOPHILS ABSOLUTE COUNT (BEAKER) (test  0.13 K/ L  0.00-0.50  



 code=416)      

 

 BASOPHILS ABSOLUTE COUNT (BEAKER) (test  0.06 K/ L  0.00-0.20  



 code=417)      



0.00URINALYSIS W/ [id number] 20:36:00





 Test Item  Value  Reference Range  Comments

 

 COLOR (BEAKER) (test code=470)  Yellow    

 

 CLARITY (BEAKER) (test code=469)  Hazy    

 

 SPECIFIC GRAVITY UA (BEAKER) (test code=468)  1.012  1.001-1.035  

 

 PH UA (BEAKER) (test code=467)  5.5  5.0-8.0  

 

 PROTEIN UA (BEAKER) (test code=464)  100 mg/dL  Negative  

 

 GLUCOSE UA (BEAKER) (test code=365)  Negative  Negative  

 

 KETONES UA (BEAKER) (test code=371)  Negative  Negative  

 

 BILIRUBIN UA (BEAKER) (test code=462)  Negative  Negative  

 

 BLOOD UA (BEAKER) (test code=461)  Moderate  Negative  

 

 NITRITE UA (BEAKER) (test code=465)  Negative  Negative  

 

 LEUKOCYTE ESTERASE UA (BEAKER) (test code=466)  Large  Negative  

 

 UROBILINOGEN UA (BEAKER) (test code=463)  3.0 mg/dL  0.2-1.0  

 

 RBC UA (BEAKER) (test code=519)  19 /HPF    

 

 WBC UA (BEAKER) (test code=520)  182 /HPF    

 

 SOURCE(BEAKER) (test code=2795)      



BASIC METABOLIC [id number] 17:00:00





 Test Item  Value  Reference Range  Comments

 

 SODIUM (BEAKER) (test  140 meq/L  136-145  



 risr=672)      

 

 POTASSIUM (BEAKER) (test  3.7 meq/L  3.5-5.1  



 code=379)      

 

 CHLORIDE (BEAKER) (test  112 meq/L    



 code=382)      

 

 CO2 (BEAKER) (test  17 meq/L  22-29  



 code=355)      

 

 BLOOD UREA NITROGEN  23 mg/dL  7-21  



 (BEAKER) (test code=354)      

 

 CREATININE (BEAKER) (test  1.00 mg/dL  0.57-1.25  



 code=358)      

 

 GLUCOSE RANDOM (BEAKER)  102 mg/dL    



 (test code=652)      

 

 CALCIUM (BEAKER) (test  8.7 mg/dL  8.4-10.2  



 code=697)      

 

 EGFR (BEAKER) (test  106 mL/min/1.73 sq m    ESTIMATED GFR IS NOT AS



 code=1092)      ACCURATE AS CREATININE



       CLEARANCE IN PREDICTING



       GLOMERULAR FILTRATION



       RATE. ESTIMATED GFR IS



       NOT APPLICABLE FOR



       DIALYSIS PATIENTS.



Specimen slightly ictericCBC W/PLT COUNT &amp; AUTO [id number] 12:18
:00





 Test Item  Value  Reference Range  Comments

 

 WHITE BLOOD CELL COUNT (BEAKER) (test code=775)  16.4 K/ L  4.0-10.0  

 

 RED BLOOD CELL COUNT (BEAKER) (test code=761)  5.22 M/ L  4.20-5.80  

 

 HEMOGLOBIN (BEAKER) (test code=410)  14.4 GM/DL  13.0-16.8  

 

 HEMATOCRIT (BEAKER) (test code=411)  42.9 %  40.0-50.0  

 

 MEAN CORPUSCULAR VOLUME (BEAKER) (test code=753)  82.2 fL  82.0-98.0  

 

 MEAN CORPUSCULAR HEMOGLOBIN (BEAKER) (test  27.5 pg  27.0-33.0  



 code=751)      

 

 MEAN CORPUSCULAR HEMOGLOBIN CONC (BEAKER) (test  33.5 GM/DL  32.0-36.0  



 code=752)      

 

 RED CELL DISTRIBUTION WIDTH (BEAKER) (test  16.2 %  10.3-14.2  



 code=412)      

 

 PLATELET COUNT (BEAKER) (test code=756)  329 K/CU MM  150-430  

 

 MEAN PLATELET VOLUME (BEAKER) (test code=754)  8.2 fL  6.5-10.5  

 

 NUCLEATED RED BLOOD CELLS (BEAKER) (test  0 /100 WBC  0-0  



 code=413)      

 

 NEUTROPHILS RELATIVE PERCENT (BEAKER) (test  83 %    



 code=429)      

 

 LYMPHOCYTES RELATIVE PERCENT (BEAKER) (test  7 %    



 code=430)      

 

 MONOCYTES RELATIVE PERCENT (BEAKER) (test  9 %    



 code=431)      

 

 EOSINOPHILS RELATIVE PERCENT (BEAKER) (test  0 %    



 code=432)      

 

 BASOPHILS RELATIVE PERCENT (BEAKER) (test  0 %    



 code=437)      

 

 NEUTROPHILS ABSOLUTE COUNT (BEAKER) (test  1.62 K/ L  1.80-8.00  



 code=670)      

 

 LYMPHOCYTES ABSOLUTE COUNT (BEAKER) (test  1.15 K/ L  1.48-4.50  



 code=414)      

 

 MONOCYTES ABSOLUTE COUNT (BEAKER) (test  1.56 K/ L  0.00-1.30  



 code=415)      

 

 EOSINOPHILS ABSOLUTE COUNT (BEAKER) (test  0.01 K/ L  0.00-0.50  



 code=416)      

 

 BASOPHILS ABSOLUTE COUNT (BEAKER) (test  0.02 K/ L  0.00-0.20  



 code=417)      



(MANUAL DIFFERENTIAL)2017 12:18:00





 Test Item  Value  Reference Range  Comments

 

 TOTAL COUNTED (BEAKER) (test code=1351)      

 

 WBC MORPHOLOGY (BEAKER) (test code=487)  Normal    

 

 PLT MORPHOLOGY (BEAKER) (test code=486)  Normal    

 

 RBC MORPHOLOGY (BEAKER) (test code=762)  Normal

## 2018-07-26 NOTE — XMS REPORT
[facility]

 Created on:2018



Patient:Redd Dale

Sex:Male

:1985

External Reference #:[id number]





Demographics







 Address  [address]  [place] [address]

 

 Phone  [phone]

 

 Preferred Language  en

 

 Marital Status  Unknown

 

 [employer] Affiliation  Unknown

 

 Race  Black or 

 

 Ethnic Group  Not  or 









Author







 Organization  [employer]









Care Team Providers







 Name  Role  Phone

 

 Knox, Na  Provider Role  Unavailable









Allergies, Adverse Reactions, Alerts







 Substance  Reaction  Event Type

 

 Toradol  Info Not Available  Drug Allergy

 

 Sulfa  Info Not Available  Drug Allergy

 

 Zofran  Info Not Available  Drug Allergy

 

 Morphine Sulfate ER  Info Not Available  Drug Allergy

 

 Levaquin  Info Not Available  Drug Allergy







Problems







 Problem Type  Condition  Code  Onset Dates  Condition Status

 

 Problem  Nicotine dependence  F17.200    Active

 

 Problem  Lumbar spina bifida with  Q05.2    Active



   hydrocephalus      

 

 Problem  Dependence on wheelchair  Z99.3    Active

 

 Problem  Fecal incontinence  R15.9    Active

 

 Problem  Foot ulcer  L97.509    Active

 

 Problem  Depression with anxiety  F41.8    Active

 

 Problem  Paraplegia  G82.20    Active

 

 Problem  Constipation  K59.00    Active

 

 Problem  Incontinence of urine  R32    Active

 

 Problem  Nausea alone  R11.0    Active

 

 Assessment  Episodic tension-type headache, not  G44.219    Active



   intractable      

 

 Assessment   (ventriculoperitoneal) shunt  Z98.2    Active



   status      

 

 Assessment  Ulcer of left foot, unspecified  L97.529    Active



   ulcer stage      

 

 Assessment  Nonintractable episodic headache,  R51    Active



   unspecified headache type      

 

 Assessment  Depression with anxiety  F41.8    Active

 

 Problem  Episodic tension-type headache, not  G44.219    Active



   intractable      

 

 Assessment  Lumbar spina bifida with  Q05.2    Active



   hydrocephalus      

 

 Problem  Nonintractable episodic headache,  R51    Active



   unspecified headache type      

 

 Assessment  Nausea and vomiting, intractability  R11.2    Active



   of vomiting not specified,      



   unspecified vomiting type      

 

 Problem   (ventriculoperitoneal) shunt  Z98.2    Active



   status      







Medications







 Medication  Code  Code  Instructions  Start  End  Status  Dosage



   System      Date  Date    

 

 Tylenol with  NDC  73055476382  300-60 MG      Active  1 tablet as



 Codeine #4      Orally every 6        needed



       hrs        

 

 Macrobid  NDC  [id number]  100 MG Orally      Active  1 capsule



       every 12 hrs        with food

 

 Pantoprazole  NDC  [id number]  40 MG Orally      Active  1 tablet



 Sodium      Once a day        

 

 Collagenase  NDC  [address]-80  250 UNIT/GM      Active  1 application



       Externally        to affected



       Once a day        area







Results

No Known Results



Summary Purpose

[employer] Submission

## 2018-07-26 NOTE — ER
Nurse's Notes                                                                                     

 [facility]                                                                

Name: Redd Dale Jr                                                                            

Age: 32 yrs                                                                                       

Sex: Male                                                                                         

: 1985                                                                                   

MRN: [id number]                                                                                   

Arrival Date: 2018                                                                          

Time: 10:57                                                                                       

Account#: [id number]                                                                            

Bed 19                                                                                            

Private MD: Camelia Knox                                                                              

Diagnosis: Urinary tract infection, site not specified                                            

                                                                                                  

Presentation:                                                                                     

                                                                                             

11:00 Presenting complaint: Patient states: my catheter hasn't been changed for 3 months, its hj  

      supposed to be changed every 4-6 weeks by home health, but they haven't done it so,         

      now, my stomach hurts, looks like my urine is backing up on my system; reports fever 2      

      days ago;. Transition of care: patient was not received from another setting of care.       

      Onset of symptoms. Risk Assessment: Do you want to hurt yourself or someone else?           

      Patient reports no desire to harm self or others. Initial Sepsis Screen: Does the           

      patient meet any 2 criteria? No. Patient's initial sepsis screen is negative. Does the      

      patient have a suspected source of infection? No. Patient's initial sepsis screen is        

      negative. Care prior to arrival: contreras cath in leg bacg;.                                   

11:00 Method Of Arrival: Ambulatory                                                             

11:00 Acuity: AYESHA 3                                                                           hj  

                                                                                                  

Triage Assessment:                                                                                

11:03 General: Appears in no apparent distress. uncomfortable, Behavior is calm, cooperative, hj  

      appropriate for age. Pain: Complains of pain in back and abdomen.                           

                                                                                                  

Historical:                                                                                       

- Allergies:                                                                                      

11:03 Amoxicillin;                                                                            hj  

11:03 Bactrim;                                                                                hj  

11:03 Ciprofloxacin;                                                                          hj  

11:03 CLAVULANIC ACID;                                                                        hj  

11:03 Doxycycline;                                                                            hj  

11:03 Levofloxacin;                                                                             

11:03 Morphine;                                                                               hj  

11:03 sulfamethoxazole;                                                                       hj  

11:03 Toradol;                                                                                hj  

11:03 TRIMETHOPRIM;                                                                           hj  

11:03 Vancomycin;                                                                             hj  

11:03 Zofran;                                                                                 hj  

- Home Meds:                                                                                      

11:03 metoprolol tartrate 25 mg Oral tab 1 tab 2 times per day [Active]; pantoprazole 40 mg   hj  

      Oral TbEC 1 tab once daily [Active]; ProMod Protein Oral liqd 30 mL twice a day             

      [Active]; Vitamin C 500 mg Oral tab twice a day [Active]; zinc sulfate 220 (50) mg Oral     

      cap daily [Active];                                                                         

- PMHx:                                                                                           

11:03 Asthma; Cerebral Palsy; cluster headaches; decubitus ulcers on feet; GERD;              hj  

      Hydrocephalus; Hypertension; spina bifida;                                                  

- PSHx:                                                                                           

11:03 Unable to obtain;                                                                       hj  

                                                                                                  

- Immunization history:: Adult Immunizations up to date.                                          

- Social history:: Smoking status: Patient/guardian denies using tobacco,                         

  Patient/guardian denies using alcohol.                                                          

- Ebola Screening: : Patient negative for fever greater than or equal to 101.5 degrees            

  Fahrenheit, and additional compatible Ebola Virus Disease symptoms Patient denies               

  exposure to infectious person Patient denies travel to an Ebola-affected area in the            

  21 days before illness onset.                                                                   

- Family history:: not pertinent.                                                                 

- Hospitalizations: : No recent hospitalization is reported.                                      

                                                                                                  

                                                                                                  

Screenin:03 Abuse screen: Denies threats or abuse. Denies injuries from another. Nutritional        hj  

      screening: No deficits noted. Tuberculosis screening: No symptoms or risk factors           

      identified. Fall Risk None identified.                                                      

                                                                                                  

Assessment:                                                                                       

11:09 General: Appears in no apparent distress. Behavior is calm, cooperative. Pain:          ed1 

      Complains of pain in abdomen Pain does not radiate. Pain currently is 8 out of 10 on a      

      pain scale. Quality of pain is described as sharp, Pain began 1 day ago. Is continuous.     

      Neuro: Level of Consciousness is awake, alert, obeys commands, Oriented to person,          

      place, time, situation. Cardiovascular: Denies chest pain, Heart tones S1 S2 present.       

      Respiratory: Airway is patent Respiratory effort is even, unlabored, Respiratory            

      pattern is regular, symmetrical, Breath sounds are clear bilaterally. GI: Abdomen is        

      obese, Bowel sounds present X 4 quads. Abd is soft and non tender X 4 quads. Reports        

      lower abdominal pain, Patient currently denies diarrhea, nausea, vomiting. :              

      suprapubic catheter in place Reports Pt previously seen by home health. Currently no        

      home health services in place. Last catheter change over 3 months ago. EENT: No signs       

      and/or symptoms were reported regarding the EENT system. Derm: No signs and/or symptoms     

      reported regarding the dermatologic system.                                                 

11:15 General: The previous assessment is accurate. Call light remains within reach. .        ss  

12:03 Reassessment: Catheter clamped per provider instructions.                               ed1 

                                                                                                  

Vital Signs:                                                                                      

11:03  / 88; Pulse 103; Resp 18; Temp 98.7(TE); Pulse Ox 98% on R/A; Weight 131.54 kg;  hj  

      Height 4 ft. 11 in. (149.86 cm); Pain 8/10;                                                 

12:16  / 76 Sitting; Pulse 96; Resp 18; Pulse Ox 97% on R/A;                            mh5 

11:03 Body Mass Index 58.57 (131.54 kg, 149.86 cm)                                              

                                                                                                  

ED Course:                                                                                        

10:57 Patient arrived in ED.                                                                  mr  

10:57 Camelia Knox MD is Private Physician.                                                      mr  

11:02 Triage completed.                                                                       hj  

11:03 Arm band placed on right wrist.                                                         hj  

11:03 Patient has correct armband on for positive identification. Placed in gown. Bed in low  hj  

      position. Call light in reach. Side rails up X 1.                                           

11:08 Xiao King LVN is Primary Nurse.                                                     ed1 

11:13 Carolyn Madrid FNP is PHCP.                                                           kav 

11:13 Juan Luis Lerner MD is Attending Physician.                                              kav 

12:36 Camelia Knox MD is Referral Physician.                                                     kav 

13:37 Patient did not have IV access during this emergency room visit.                        aj1 

13:37 No provider procedures requiring assistance completed.                                  aj1 

14:01 Urine Dipstick--Ancillary (enter results) Sent.                                         eb  

                                                                                                  

Administered Medications:                                                                         

11:34 CANCELLED (Other Intervention Used): fentaNYL (PF) 25 mcg IVP once                      ed1 

11:51 Drug: fentaNYL (PF) 25 mcg Route: IM; Site: right deltoid;                              ed1 

13:37 Follow up: Response: No adverse reaction                                                aj1 

12:59 Drug: Rocephin (cefTRIAXone) 1 grams Route: IM; Site: right gluteus;                    aj1 

13:37 Follow up: Response: No adverse reaction                                                aj1 

13:01 Not Given (Physician Discretion): NS 0.9% 1000 ml IV at 1000 ml once                    aj1 

                                                                                                  

                                                                                                  

Outcome:                                                                                          

12:36 Discharge ordered by MD.                                                                kav 

13:38 Discharged to home via wheelchair.                                                      aj1 

13:38 Condition: good                                                                             

13:38 Discharge instructions given to patient, Instructed on discharge instructions, follow       

      up and referral plans. medication usage, Demonstrated understanding of instructions,        

      follow-up care, medications.                                                                

13:38 Patient left the ED.                                                                    aj1 

                                                                                                  

Signatures:                                                                                       

Heather Perez RN RN   aj                                                  

Carolyn Madrid FNP                    ALTHEA  [facility]                                                  

Mehnaz Sharp                                mr                                                   

Crystal Phillip RN RN                                                      

Xiao King LVN LVN  ed1                                                  

Dale Nathan, YADIEL                      RN   Mehnaz Camacho                              [facility]                                                  

Breana Chinchilla                                                   

                                                                                                  

Corrections: (The following items were deleted from the chart)                                    

11:05 11:03 Pulse 103bpm; Resp 18bpm; Pulse Ox 98% RA; Temp 98.7F Temporal; 131.54 kg; Height hj  

      4 ft. 11 in.; BMI: 58.5; Pain 8/10; hj                                                      

                                                                                                  

**************************************************************************************************

## 2018-07-26 NOTE — XMS REPORT
Clinical Summary

 Created on:2018



Patient:Redd Dale

Sex:Male

:1985

External Reference #:[id number]





Demographics







 Address  [address]



   [place] [address]

 

 Home Phone  [phone]

 

 Preferred Language  English

 

 Marital Status  Unknown

 

 Caodaism Affiliation  Unknown

 

 Race  Black or 

 

 Ethnic Group  Not  or 









Author







 Organization  [facility]

 

 Address  [address] [place] [address]

 

 Phone  [phone]









Support







 Name  Relationship  Address  Phone

 

 Unavailable  Unavailable  [address]  [phone]



     [place] [address]  









Care Team Providers







 Name  Role  Phone

 

 Unavailable  Primary Care Provider  Unavailable









Allergies







 Active Allergy  Reactions  Severity  Noted Date  Comments

 

 Sulfamethoxazole-Trimethoprim  Hives  High  2017  

 

 Levofloxacin  Hives  High  2017  

 

 Morphine  Hives  High  2017  

 

 Ketorolac  Rash  High  2017  

 

 Vancomycin Analogues  Rash  High  2017  

 

 Sesame Seed  Hives    2017  

 

 Ondansetron Hcl (Pf)  Nausea And Vomiting    2017  







Current Medications







 Prescription  Sig.  Disp.  Refills  Start Date  End Date  Status

 

 oxyCODONE-acetaminophen  Take 1 tablet by          Active



 (PERCOCET)  mg per  mouth every 4          



 tablet  (four) hours as          



   needed for Pain.          







Active Problems







 Problem  Noted Date

 

 Spina bifida (HCC)  2017

 

 Pyelonephritis  2017







Social History







 Tobacco Use  Types  Packs/Day  Years Used  Date

 

 Current Every Day Smoker  Cigarettes  0.5    









 Tobacco Cessation: Ready to Quit: No









 Sex Assigned at Birth  Date Recorded

 

 Not on file  







Last Filed Vital Signs

Not on file



Plan of Treatment

Not on file



Results

Not on fileafter 2017

## 2018-08-22 ENCOUNTER — HOSPITAL ENCOUNTER (INPATIENT)
Dept: HOSPITAL 97 - ER | Age: 33
LOS: 7 days | Discharge: HOME HEALTH | DRG: 698 | End: 2018-08-29
Attending: FAMILY MEDICINE | Admitting: INTERNAL MEDICINE
Payer: COMMERCIAL

## 2018-08-22 VITALS — BODY MASS INDEX: 59.5 KG/M2

## 2018-08-22 DIAGNOSIS — G89.29: ICD-10-CM

## 2018-08-22 DIAGNOSIS — E66.01: ICD-10-CM

## 2018-08-22 DIAGNOSIS — B96.4: ICD-10-CM

## 2018-08-22 DIAGNOSIS — Z98.2: ICD-10-CM

## 2018-08-22 DIAGNOSIS — L89.614: ICD-10-CM

## 2018-08-22 DIAGNOSIS — Z88.1: ICD-10-CM

## 2018-08-22 DIAGNOSIS — B96.89: ICD-10-CM

## 2018-08-22 DIAGNOSIS — T83.518A: Primary | ICD-10-CM

## 2018-08-22 DIAGNOSIS — M86.372: ICD-10-CM

## 2018-08-22 DIAGNOSIS — L89.513: ICD-10-CM

## 2018-08-22 DIAGNOSIS — B96.20: ICD-10-CM

## 2018-08-22 DIAGNOSIS — N30.00: ICD-10-CM

## 2018-08-22 DIAGNOSIS — Q05.9: ICD-10-CM

## 2018-08-22 DIAGNOSIS — M54.9: ICD-10-CM

## 2018-08-22 DIAGNOSIS — Z88.5: ICD-10-CM

## 2018-08-22 DIAGNOSIS — G82.20: ICD-10-CM

## 2018-08-22 DIAGNOSIS — Z88.2: ICD-10-CM

## 2018-08-22 DIAGNOSIS — A41.9: ICD-10-CM

## 2018-08-22 LAB
ALBUMIN SERPL BCP-MCNC: 2.9 G/DL (ref 3.4–5)
ALP SERPL-CCNC: 130 U/L (ref 45–117)
ALT SERPL W P-5'-P-CCNC: 34 U/L (ref 12–78)
AST SERPL W P-5'-P-CCNC: 19 U/L (ref 15–37)
BUN BLD-MCNC: 10 MG/DL (ref 7–18)
GLUCOSE SERPLBLD-MCNC: 78 MG/DL (ref 74–106)
HCT VFR BLD CALC: 44.8 % (ref 39.6–49)
LIPASE SERPL-CCNC: 50 U/L (ref 73–393)
LYMPHOCYTES # SPEC AUTO: 1.4 K/UL (ref 0.7–4.9)
MCH RBC QN AUTO: 26.6 PG (ref 27–35)
MCV RBC: 81.2 FL (ref 80–100)
PMV BLD: 8 FL (ref 7.6–11.3)
POTASSIUM SERPL-SCNC: 4 MMOL/L (ref 3.5–5.1)
RBC # BLD: 5.53 M/UL (ref 4.33–5.43)
UA COMPLETE W REFLEX CULTURE PNL UR: (no result)

## 2018-08-22 PROCEDURE — 83690 ASSAY OF LIPASE: CPT

## 2018-08-22 PROCEDURE — 80048 BASIC METABOLIC PNL TOTAL CA: CPT

## 2018-08-22 PROCEDURE — 87040 BLOOD CULTURE FOR BACTERIA: CPT

## 2018-08-22 PROCEDURE — 87176 TISSUE HOMOGENIZATION CULTR: CPT

## 2018-08-22 PROCEDURE — 74177 CT ABD & PELVIS W/CONTRAST: CPT

## 2018-08-22 PROCEDURE — 85025 COMPLETE CBC W/AUTO DIFF WBC: CPT

## 2018-08-22 PROCEDURE — 80053 COMPREHEN METABOLIC PANEL: CPT

## 2018-08-22 PROCEDURE — 87088 URINE BACTERIA CULTURE: CPT

## 2018-08-22 PROCEDURE — 82962 GLUCOSE BLOOD TEST: CPT

## 2018-08-22 PROCEDURE — 87186 SC STD MICRODIL/AGAR DIL: CPT

## 2018-08-22 PROCEDURE — 71045 X-RAY EXAM CHEST 1 VIEW: CPT

## 2018-08-22 PROCEDURE — 83605 ASSAY OF LACTIC ACID: CPT

## 2018-08-22 PROCEDURE — 87205 SMEAR GRAM STAIN: CPT

## 2018-08-22 PROCEDURE — 87086 URINE CULTURE/COLONY COUNT: CPT

## 2018-08-22 PROCEDURE — 84145 PROCALCITONIN (PCT): CPT

## 2018-08-22 PROCEDURE — 87077 CULTURE AEROBIC IDENTIFY: CPT

## 2018-08-22 PROCEDURE — 81015 MICROSCOPIC EXAM OF URINE: CPT

## 2018-08-22 PROCEDURE — 87070 CULTURE OTHR SPECIMN AEROBIC: CPT

## 2018-08-22 PROCEDURE — 99285 EMERGENCY DEPT VISIT HI MDM: CPT

## 2018-08-22 PROCEDURE — 80076 HEPATIC FUNCTION PANEL: CPT

## 2018-08-22 PROCEDURE — 81003 URINALYSIS AUTO W/O SCOPE: CPT

## 2018-08-22 PROCEDURE — 36415 COLL VENOUS BLD VENIPUNCTURE: CPT

## 2018-08-22 NOTE — XMS REPORT
[facility]

 Created on:2018



Patient:Redd Dale

Sex:Male

:1985

External Reference #:[id number]





Demographics







 Address  [address]  [place] [address]

 

 Phone  [phone]

 

 Preferred Language  en

 

 Marital Status  Unknown

 

 Gnosticism Affiliation  Unknown

 

 Race  Black or 

 

 Ethnic Group  Unknown









Author







 Organization  [employer]









Care Team Providers







 Name  Role  Phone

 

 Knox, Na  Provider Role  Unavailable









Allergies, Adverse Reactions, Alerts







 Substance  Reaction  Event Type

 

 Zofran  Info Not Available  Drug Allergy

 

 Morphine Sulfate ER  Info Not Available  Drug Allergy

 

 Levaquin  Info Not Available  Drug Allergy







Problems







 Problem Type  Condition  Code  Onset Dates  Condition Status

 

 Assessment  Mental disorder, not otherwise  F99    Active



   specified      

 

 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



   disorder      

 

 Assessment  Ulcer of left foot, unspecified  L97.529    Active



   ulcer stage      

 

 Problem  Foot ulcer  L97.509    Active

 

 Assessment  Nonintractable episodic headache,  R51    Active



   unspecified headache type      

 

 Problem  Constipation  K59.00    Active

 

 Assessment  Episodic tension-type headache, not  G44.219    Active



   intractable      

 

 Problem  Paraplegia  G82.20    Active

 

 Problem  Incontinence of urine  R32    Active

 

 Problem  Nausea alone  R11.0    Active

 

 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



   disorder      

 

 Problem  Mental disorder, not otherwise  F99    Active



   specified      

 

 Assessment  Bipolar depression  F31.30    Active

 

 Assessment  Lumbar spina bifida with  Q05.2    Active



   hydrocephalus      

 

 Problem  Bipolar depression  F31.30    Active

 

 Assessment   (ventriculoperitoneal) shunt  Z98.2    Active



   status      

 

 Problem  Depression with anxiety  F41.8    Active

 

 Problem  Fecal incontinence  R15.9    Active

 

 Problem  Nausea and vomiting, intractability  R11.2    Active



   of vomiting not specified,      



   unspecified vomiting type      

 

 Problem  Ulcer of left foot, unspecified  L97.529    Active



   ulcer stage      

 

 Problem  Episodic tension-type headache, not  G44.219    Active



   intractable      

 

 Problem  Nonintractable episodic headache,  R51    Active



   unspecified headache type      

 

 Assessment  Depression with anxiety  F41.8    Active

 

 Problem  Dependence on wheelchair  Z99.3    Active

 

 Problem  Lumbar spina bifida with  Q05.2    Active



   hydrocephalus      

 

 Problem   (ventriculoperitoneal) shunt  Z98.2    Active



   status      

 

 Problem  Nicotine dependence  F17.200    Active







Medications







 Medication  Code  Code  Instructions  Start  End  Status  Dosage



   System      Date  Date    

 

 Collagenase  NDC  [address]-67  250 UNIT/GM      Active  1 application



       Externally        to affected



       Once a day        area

 

 Macrobid  NDC  80194166032  100 MG Orally      Active  1 capsule



       every 12 hrs        with food

 

 Tylenol with  NDC  54634242881  300-60 MG      Active  1 tablet as



 Codeine #4      Orally every 6        needed



       hrs        

 

 Citalopram  NDC  [id number]  10 MG Orally  July    Active  1 tablet



 Hydrobromide      Once a day  2018      

 

 Pantoprazole  NDC  90303251453  40 MG Orally      Active  1 tablet



 Sodium      Once a day        

 

 Seroquel  NDC  [id number]  25 MG Orally  July    Active  1 tablet



       Once a day at  16,      



       bedtime        







Results

No Known Results



Summary Purpose

[employer] Submission

## 2018-08-22 NOTE — EDPHYS
Physician Documentation                                                                           

 [facility]                                                                

Name: Redd Dale Jr                                                                            

Age: 32 yrs                                                                                       

Sex: Male                                                                                         

: 1985                                                                                   

MRN: [id number]                                                                                   

Arrival Date: 2018                                                                          

Time: 16:57                                                                                       

Account#: [id number]                                                                            

Bed 7                                                                                             

Private MD:                                                                                       

ED Physician Andrey Harper                                                                       

HPI:                                                                                              

                                                                                             

17:03 This 32 yrs old Black Male presents to ER via EMS with complaints of Suprapubic         rn  

      catherterbackup.                                                                            

17:03 The patient presents with a Ortega catheter problem, is not draining. Onset: The         rn  

      symptoms/episode began/occurred this morning. Modifying factors: The symptoms are           

      alleviated by nothing, the symptoms are aggravated by nothing. Severity of symptoms: At     

      their worst the symptoms were moderate, in the emergency department the symptoms are        

      unchanged. The patient has experienced similar episodes in the past. Reports suprapubic     

      catheter not draining like normal, noticed a change around 0500, no fever, + abd pain,      

      seen for similar symptoms 1 month ago, reports hasn't been on abx recently, + nausea, +     

      chronic back pain.                                                                          

                                                                                                  

Historical:                                                                                       

- Allergies:                                                                                      

17:02 Amoxicillin;                                                                            hj  

17:02 Bactrim;                                                                                hj  

17:02 Ciprofloxacin;                                                                          hj  

17:02 CLAVULANIC ACID;                                                                        hj  

17:02 Doxycycline;                                                                            hj  

17:02 Levofloxacin;                                                                           hj  

17:02 Morphine;                                                                               hj  

17:02 sulfamethoxazole;                                                                       hj  

17:02 Toradol;                                                                                hj  

17:02 TRIMETHOPRIM;                                                                           hj  

17:02 Vancomycin;                                                                             hj  

17:02 Zofran;                                                                                 hj  

- Home Meds:                                                                                      

17:02 metoprolol tartrate 25 mg Oral tab 1 tab 2 times per day [Active]; pantoprazole 40 mg   hj  

      Oral TbEC 1 tab once daily [Active]; ProMod Protein Oral liqd 30 mL twice a day             

      [Active]; Vitamin C 500 mg Oral tab twice a day [Active]; zinc sulfate 220 (50) mg Oral     

      cap daily [Active];                                                                         

- PMHx:                                                                                           

17:02 Asthma; Cerebral Palsy; cluster headaches; decubitus ulcers on feet; GERD;              hj  

      Hydrocephalus; Hypertension; spina bifida;                                                  

- PSHx:                                                                                           

17:02 Unable to obtain;                                                                       hj  

                                                                                                  

- Immunization history:: Adult Immunizations up to date.                                          

- Social history:: Smoking status: Patient/guardian denies using tobacco,                         

  Patient/guardian denies using alcohol.                                                          

- Ebola Screening: : Patient negative for fever greater than or equal to 101.5 degrees            

  Fahrenheit, and additional compatible Ebola Virus Disease symptoms Patient denies               

  exposure to infectious person Patient denies travel to an Ebola-affected area in the            

  21 days before illness onset.                                                                   

- Family history:: not pertinent.                                                                 

- Hospitalizations: : No recent hospitalization is reported.                                      

                                                                                                  

                                                                                                  

ROS:                                                                                              

17:03 Constitutional: Negative for fever, chills, and weight loss, Eyes: Negative for injury, rn  

      pain, redness, and discharge, Cardiovascular: Negative for chest pain, palpitations,        

      and edema, Respiratory: Negative for shortness of breath, cough, wheezing, and              

      pleuritic chest pain, Abdomen/GI: + abd pain, + nausea Back: + chronic back pain            

      MS/Extremity: Negative for injury and deformity, Skin: Negative for injury, rash, and       

      discoloration, Neuro: + generalized weakness                                                

                                                                                                  

Exam:                                                                                             

17:03 Constitutional:  This is a well developed, well nourished patient who is awake, alert,  rn  

      and in no acute distress. Head/Face:  Normocephalic, atraumatic. Eyes:  Pupils equal        

      round and reactive to light, extra-ocular motions intact.  Lids and lashes normal.          

      Conjunctiva and sclera are non-icteric and not injected.  Cornea within normal limits.      

      Periorbital areas with no swelling, redness, or edema. ENT:  dry MM Cardiovascular:         

      Regular rate and rhythm with a normal S1 and S2.  No gallops, murmurs, or rubs.  Normal     

      PMI, no JVD.  No pulse deficits. Respiratory:  Lungs have equal breath sounds               

      bilaterally, clear to auscultation and percussion.  No rales, rhonchi or wheezes noted.     

       No increased work of breathing, no retractions or nasal flaring. Abdomen/GI:  soft, +      

      mild lower abd tenderness, no rebound MS/ Extremity:  No cyanosis.  + chronic pressure      

      ulcers to bilateral feet with foul smell, no evidence of wet gangrene, no crepitus          

      Neuro:  Awake and alert, GCS 15, oriented to person, place, time, and situation.            

      Cranial nerves II-XII grossly intact.                                                       

                                                                                                  

Vital Signs:                                                                                      

17:03  / 50; Pulse 95; Resp 18; Temp 99.2(O); Pulse Ox 98% on R/A; Weight 131.54 kg;    hj  

      Height 4 ft. 11 in. (149.86 cm); Pain 10/10;                                                

18:00  / 55; Pulse 92; Resp 18; Pulse Ox 100% on R/A;                                   hj  

18:45  / 60; Pulse 90; Resp 18; Pulse Ox 99% on R/A;                                    hj  

19:40  / 76; Pulse 90; Resp 18; Pulse Ox 98% ;                                          ea  

20:21  / 87; Pulse 89; Resp 18; Pulse Ox 98% ;                                          ea  

22:13 BP 97 / 56; Pulse 86; Resp 18 S; Pulse Ox 98% on R/A;                                   vd2 

22:37  / 83; Pulse 80; Resp 18; Temp 98.6(O); Pulse Ox 98% ;                            ea  

23:34  / 78; Pulse 78; Resp 18; Pulse Ox 98% on R/A; Pain 0/10;                         ea  

17:03 Body Mass Index 58.57 (131.54 kg, 149.86 cm)                                            hj  

                                                                                                  

MDM:                                                                                              

16:57 Patient medically screened.                                                             rn  

18:43 ED course: Pt with very resistant urine culture last visit, states has had zosyn        rn  

      before, gives him non-bloody diarrhea, no difficulty breathing or rash. .                   

                                                                                                  

                                                                                             

16:58 Order name: CBC with Diff; Complete Time: 18:28                                         rn  

                                                                                             

16:58 Order name: Basic Metabolic Panel; Complete Time: 18:28                                 rn  

                                                                                             

16:58 Order name: Blood Culture Adult (2)                                                     rn  

                                                                                             

16:58 Order name: Urine Culture                                                               rn  

                                                                                             

16:58 Order name: Hepatic Function; Complete Time: 18:28                                      rn  

                                                                                             

16:58 Order name: Lipase; Complete Time: 18:28                                                rn  

                                                                                             

16:58 Order name: Urine Microscopic Only; Complete Time: 18:58                                rn  

                                                                                             

16:58 Order name: Lactate; Complete Time: 18:28                                               rn  

                                                                                             

17:06 Order name: Glucose, Ancillary Testing; Complete Time: 17:28                            EDMS

                                                                                             

17:28 Order name: CT Abd/Pelvis - W/Contrast; Complete Time: 21:27                            rn  

                                                                                             

18:04 Order name: XRAY Foot LEFT 2 View; Complete Time: 18:58                                 rn  

                                                                                             

18:04 Order name: XRAY Foot RIGHT 2 View; Complete Time: 18:55                                rn  

                                                                                             

18:43 Order name: Urine Dipstick--Ancillary (enter results); Complete Time: 20:50             ag  

                                                                                             

16:58 Order name: IV Start; Complete Time: 17:44                                              rn  

                                                                                             

16:58 Order name: Labs collected and sent; Complete Time: 17:44                               rn  

                                                                                             

16:58 Order name: Urine Dipstick-Ancillary (obtain specimen); Complete Time: 17:58            rn  

                                                                                             

16:58 Order name: Bladder Irrigation; Complete Time: 19:32                                    rn  

                                                                                             

16:58 Order name: Bladder Scanner; Complete Time: 17:29                                       rn  

                                                                                                  

Administered Medications:                                                                         

17:50 Drug: Demerol 50 mg Route: IVP; Site: right antecubital;                                hj  

18:30 Follow up: Response: Pain is decreased                                                  hj  

18:43 Drug: Zosyn 3.375 grams Route: IVPB; Infused Over: 60 mins; Site: right antecubital;    hj  

19:32 Follow up: IV Status: Completed infusion                                                hj  

19:47 Drug: Tylenol 650 mg Route: PO;                                                         ea  

20:20 Follow up: Response: No adverse reaction; Marked relief of symptoms                     ea  

19:48 Drug: NS 0.9% 1000 ml Route: IV; Rate: 125 ml/hr; Site: left antecubital;               ea  

22:38 Follow up: IV Status: Infusion continued upon admission                                 ea  

22:54 Drug: fentaNYL (PF) 50 mcg Route: IVP; Site: right forearm;                             ea  

23:28 Follow up: Response: No adverse reaction; Pain is decreased                             fc  

                                                                                                  

                                                                                                  

Point of Care Testing:                                                                            

      Blood Glucose:                                                                              

17:03 Blood Glucose: 55 mg/dL;                                                                hj  

19:40 Blood Glucose: 77 mg/dL;                                                                ea  

      Ranges:                                                                                     

      Critical Glucose Levels:Adult <50 mg/dl or >400 mg/dl  <40 mg/dl or >180 mg/dl       

Disposition:                                                                                      

18 22:01 Hospitalization ordered by Joan Rice for Inpatient Admission. Preliminary    

  diagnosis is Sepsis, unspecified organism.                                                      

- Bed requested for Telemetry/MedSurg (Inpatient).                                                

- Status is Inpatient Admission.                                                              ea  

- Condition is Stable.                                                                            

- Problem is new.                                                                                 

- Symptoms have improved.                                                                         

UTI on Admission? Yes                                                                             

                                                                                                  

                                                                                                  

                                                                                                  

Signatures:                                                                                       

Dispatcher [employer]                           Adrienne Meza RN RN kl Nieto, Roman, MD MD rn Joaquin, Henry, RN RN hj Antunez, Elena, RN RN ea Starr, Gregory, MD MD gs Chretien, Felicia                       RN                                                      

                                                                                                  

Corrections: (The following items were deleted from the chart)                                    

18:05 17:03 Constitutional: This is a well developed, well nourished patient who is awake,    rn  

      alert, and in no acute distress. Head/Face: Normocephalic, atraumatic. Eyes: Pupils         

      equal round and reactive to light, extra-ocular motions intact. Lids and lashes normal.     

      Conjunctiva and sclera are non-icteric and not injected. Cornea within normal limits.       

      Periorbital areas with no swelling, redness, or edema. ENT: dry MM Cardiovascular:          

      Regular rate and rhythm with a normal S1 and S2. No gallops, murmurs, or rubs. Normal       

      PMI, no JVD. No pulse deficits. Respiratory: Lungs have equal breath sounds                 

      bilaterally, clear to auscultation and percussion. No rales, rhonchi or wheezes noted.      

      No increased work of breathing, no retractions or nasal flaring. Abdomen/GI: soft, +        

      mild lower abd tenderness, no rebound MS/ Extremity: No cyanosis. + chronic pressure        

      ulcers to bilateral feet Neuro: Awake and alert, GCS 15, oriented to person, place,         

      time, and situation. Cranial nerves II-XII grossly intact. rn                               

22:22 22:01 Hospitalization Ordered by Joan Rice MD for Inpatient Admission. Preliminary kl  

      diagnosis is Sepsis, unspecified organism. Bed requested for Telemetry/MedSurg              

      (Inpatient). Status is Inpatient Admission. Condition is Stable. Problem is new.            

      Symptoms have improved. UTI on Admission? Yes.                                            

23:35 22:22 2018 22:01 Hospitalization Ordered by Joan Rice MD for Inpatient       ea  

      Admission. Preliminary diagnosis is Sepsis, unspecified organism. Bed requested for         

      Telemetry/MedSurg (Inpatient). Status is Inpatient Admission. Condition is Stable.          

      Problem is new. Symptoms have improved. UTI on Admission? Yes. kl                           

                                                                                                  

**************************************************************************************************

## 2018-08-22 NOTE — XMS REPORT
[facility]

 Created on:2018



Patient:Redd Dale

Sex:Male

:1985

External Reference #:[id number]





Demographics







 Address  [address] [place] [address]

 

 Phone  [phone]

 

 Preferred Language  en

 

 Marital Status  Unknown

 

 L[employer]-day Affiliation  Unknown

 

 Race  Black or 

 

 Ethnic Group  Not  or 









Author







 Organization  [employer]









Care Team Providers







 Name  Role  Phone

 

 Knox, Na  Provider Role  Unavailable









Allergies

No Known Allergies



Problems







 Problem Type  Condition  Code  Onset Dates  Condition Status

 

 Problem  Nicotine dependence  F17.200    Active

 

 Problem  Lumbar spina bifida with  Q05.2    Active



   hydrocephalus      

 

 Problem  Dependence on wheelchair  Z99.3    Active

 

 Problem  Fecal incontinence  R15.9    Active

 

 Problem  Foot ulcer  L97.509    Active

 

 Problem  Depression with anxiety  F41.8    Active

 

 Problem  Paraplegia  G82.20    Active

 

 Problem  Constipation  K59.00    Active

 

 Problem  Incontinence of urine  R32    Active

 

 Problem  Nausea alone  R11.0    Active

 

 Problem  Episodic tension-type headache, not  G44.219    Active



   intractable      

 

 Problem  Nonintractable episodic headache,  R51    Active



   unspecified headache type      

 

 Problem   (ventriculoperitoneal) shunt  Z98.2    Active



   status      







Medications

No Known Medications



Results

No Known Results



Summary Purpose

[employer] Submission

## 2018-08-22 NOTE — XMS REPORT
Patient Summary Document

 Created on:2018



Patient:JORDAN VERDIN

Sex:Male

:1985

External Reference #:[id number]





Demographics







 Address  [address]



   [place] [address]

 

 Home Phone  [phone]

 

 Work Phone  [phone]

 

 Email Address  [phone]

 

 Preferred Language  Unknown

 

 Marital Status  Unknown

 

 Gnosticist Affiliation  Unknown

 

 Race  Unknown

 

 Additional Race(s)  Unavailable

 

 Ethnic Group  Unknown









Author







 Organization  [facility]

 

 Address  [address] [place] [address] [address]



   [place] [address]

 

 Phone  [phone]









Care Team Providers







 Name  Role  Phone

 

 RAMU TORRES  Unavailable  Unavailable









Problems

This patient has no known problems.



Allergies, Adverse Reactions, Alerts

This patient has no known allergies or adverse reactions.



Medications

This patient has no known medications.



Results







 Test Description  Test Time  Test Comments  Text Results  Atomic Results  
Result Comments









 BLOOD CULTURE  2017 16:22:00    









   Test Item  Value  Reference Range  Comments









 CULTURE (BEAKER) (test code=1095)  No growth in 5 days    



BLOOD [id number] 16:22:00





 Test Item  Value  Reference Range  Comments

 

 CULTURE (BEAKER) (test code=1095)  No growth in 5 days    



(MANUAL DIFFERENTIAL)2017 22:29:00





 Test Item  Value  Reference Range  Comments

 

 TOTAL COUNTED (BEAKER) (test code=1351)      

 

 WBC MORPHOLOGY (BEAKER) (test code=487)  Normal    

 

 PLT MORPHOLOGY (BEAKER) (test code=486)  Normal    

 

 RBC MORPHOLOGY (BEAKER) (test code=762)  Normal    



CBC W/PLT COUNT &amp; AUTO [id number] 22:28:00





 Test Item  Value  Reference Range  Comments

 

 WHITE BLOOD CELL COUNT (BEAKER) (test code=775)  7.3 K/ L  4.0-10.0  

 

 RED BLOOD CELL COUNT (BEAKER) (test code=761)  5.09 M/ L  4.20-5.80  

 

 HEMOGLOBIN (BEAKER) (test code=410)  13.0 GM/DL  13.0-16.8  

 

 HEMATOCRIT (BEAKER) (test code=411)  42.3 %  40.0-50.0  

 

 MEAN CORPUSCULAR VOLUME (BEAKER) (test code=753)  83.0 fL  82.0-98.0  

 

 MEAN CORPUSCULAR HEMOGLOBIN (BEAKER) (test  25.6 pg  27.0-33.0  



 code=751)      

 

 MEAN CORPUSCULAR HEMOGLOBIN CONC (BEAKER) (test  30.8 GM/DL  32.0-36.0  



 code=752)      

 

 RED CELL DISTRIBUTION WIDTH (BEAKER) (test  18.0 %  10.3-14.2  



 code=412)      

 

 PLATELET COUNT (BEAKER) (test code=756)  331 K/CU MM  150-430  

 

 MEAN PLATELET VOLUME (BEAKER) (test code=754)  8.5 fL  6.5-10.5  

 

 NUCLEATED RED BLOOD CELLS (BEAKER) (test  0 /100 WBC  0-0  



 code=413)      

 

 NEUTROPHILS RELATIVE PERCENT (BEAKER) (test  65 %    



 code=429)      

 

 LYMPHOCYTES RELATIVE PERCENT (BEAKER) (test  23 %    



 code=430)      

 

 MONOCYTES RELATIVE PERCENT (BEAKER) (test  8 %    



 code=431)      

 

 EOSINOPHILS RELATIVE PERCENT (BEAKER) (test  3 %    



 code=432)      

 

 BASOPHILS RELATIVE PERCENT (BEAKER) (test  1 %    



 code=437)      

 

 NEUTROPHILS ABSOLUTE COUNT (BEAKER) (test  4.75 K/ L  1.80-8.00  



 code=670)      

 

 LYMPHOCYTES ABSOLUTE COUNT (BEAKER) (test  1.68 K/ L  1.48-4.50  



 code=414)      

 

 MONOCYTES ABSOLUTE COUNT (BEAKER) (test  0.55 K/ L  0.00-1.30  



 code=415)      

 

 EOSINOPHILS ABSOLUTE COUNT (BEAKER) (test  0.24 K/ L  0.00-0.50  



 code=416)      

 

 BASOPHILS ABSOLUTE COUNT (BEAKER) (test  0.05 K/ L  0.00-0.20  



 code=417)      



[id number] METABOLIC [id number] 11:11:00





 Test Item  Value  Reference Range  Comments

 

 SODIUM (BEAKER) (test  138 meq/L  136-145  



 wqsi=412)      

 

 POTASSIUM (BEAKER) (test  4.0 meq/L  3.5-5.1  



 code=379)      

 

 CHLORIDE (BEAKER) (test  108 meq/L    



 code=382)      

 

 CO2 (BEAKER) (test  23 meq/L  22-29  



 code=355)      

 

 BLOOD UREA NITROGEN  11 mg/dL  7-21  



 (BEAKER) (test code=354)      

 

 CREATININE (BEAKER) (test  0.74 mg/dL  0.57-1.25  



 code=358)      

 

 GLUCOSE RANDOM (BEAKER)  124 mg/dL    



 (test code=652)      

 

 CALCIUM (BEAKER) (test  8.9 mg/dL  8.4-10.2  



 code=697)      

 

 EGFR (BEAKER) (test  150 mL/min/1.73 sq m    ESTIMATED GFR IS NOT AS



 code=1092)      ACCURATE AS CREATININE



       CLEARANCE IN PREDICTING



       GLOMERULAR FILTRATION



       RATE. ESTIMATED GFR IS



       NOT APPLICABLE FOR



       DIALYSIS PATIENTS.



URINE [id number] 09:56:00





 Test Item  Value  Reference Range  Comments

 

 CULTURE (BEAKER) (test code=1095)  <10,000 col/mL skin jaime    



COMPREHENSIVE METABOLIC [id number] 08:49:00





 Test Item  Value  Reference Range  Comments

 

 TOTAL PROTEIN (BEAKER)  7.3 gm/dL  6.0-8.3  



 (test code=770)      

 

 ALBUMIN (BEAKER) (test  3.3 g/dL  3.5-5.0  



 code=1145)      

 

 ALKALINE PHOSPHATASE  89 U/L    



 (BEAKER) (test code=346)      

 

 BILIRUBIN TOTAL (BEAKER)  1.4 mg/dL  0.2-1.2  



 (test code=377)      

 

 SODIUM (BEAKER) (test  137 meq/L  136-145  



 riyp=123)      

 

 POTASSIUM (BEAKER) (test  3.7 meq/L  3.5-5.1  



 code=379)      

 

 CHLORIDE (BEAKER) (test  108 meq/L    



 code=382)      

 

 CO2 (BEAKER) (test  17 meq/L  22-29  



 code=355)      

 

 BLOOD UREA NITROGEN  12 mg/dL  7-21  



 (BEAKER) (test code=354)      

 

 CREATININE (BEAKER) (test  0.78 mg/dL  0.57-1.25  



 code=358)      

 

 GLUCOSE RANDOM (BEAKER)  119 mg/dL    



 (test code=652)      

 

 CALCIUM (BEAKER) (test  8.6 mg/dL  8.4-10.2  



 code=697)      

 

 AST (SGOT) (BEAKER) (test  13 U/L  5-34  



 code=353)      

 

 ALT (SGPT) (BEAKER) (test  28 U/L  6-55  



 code=347)      

 

 EGFR (BEAKER) (test  141 mL/min/1.73 sq    ESTIMATED GFR IS NOT AS



 code=1092)  m    ACCURATE AS CREATININE



       CLEARANCE IN PREDICTING



       GLOMERULAR FILTRATION



       RATE. ESTIMATED GFR IS



       NOT APPLICABLE FOR



       DIALYSIS PATIENTS.



CBC W/PLT COUNT &amp; AUTO [id number] 08:46:00





 Test Item  Value  Reference Range  Comments

 

 WHITE BLOOD CELL COUNT (BEAKER) (test code=775)  9.4 K/ L  4.0-10.0  

 

 RED BLOOD CELL COUNT (BEAKER) (test code=761)  4.79 M/ L  4.20-5.80  

 

 HEMOGLOBIN (BEAKER) (test code=410)  12.8 GM/DL  13.0-16.8  

 

 HEMATOCRIT (BEAKER) (test code=411)  40.0 %  40.0-50.0  

 

 MEAN CORPUSCULAR VOLUME (BEAKER) (test code=753)  83.4 fL  82.0-98.0  

 

 MEAN CORPUSCULAR HEMOGLOBIN (BEAKER) (test  26.7 pg  27.0-33.0  



 code=751)      

 

 MEAN CORPUSCULAR HEMOGLOBIN CONC (BEAKER) (test  32.0 GM/DL  32.0-36.0  



 code=752)      

 

 RED CELL DISTRIBUTION WIDTH (BEAKER) (test  18.2 %  10.3-14.2  



 code=412)      

 

 PLATELET COUNT (BEAKER) (test code=756)  313 K/CU MM  150-430  

 

 MEAN PLATELET VOLUME (BEAKER) (test code=754)  8.6 fL  6.5-10.5  

 

 NUCLEATED RED BLOOD CELLS (BEAKER) (test  0 /100 WBC  0-0  



 code=413)      

 

 NEUTROPHILS RELATIVE PERCENT (BEAKER) (test  70 %    



 code=429)      

 

 LYMPHOCYTES RELATIVE PERCENT (BEAKER) (test  16 %    



 code=430)      

 

 MONOCYTES RELATIVE PERCENT (BEAKER) (test  12 %    



 code=431)      

 

 EOSINOPHILS RELATIVE PERCENT (BEAKER) (test  1 %    



 code=432)      

 

 BASOPHILS RELATIVE PERCENT (BEAKER) (test  1 %    



 code=437)      

 

 NEUTROPHILS ABSOLUTE COUNT (BEAKER) (test  6.54 K/ L  1.80-8.00  



 code=670)      

 

 LYMPHOCYTES ABSOLUTE COUNT (BEAKER) (test  1.50 K/ L  1.48-4.50  



 code=414)      

 

 MONOCYTES ABSOLUTE COUNT (BEAKER) (test  1.13 K/ L  0.00-1.30  



 code=415)      

 

 EOSINOPHILS ABSOLUTE COUNT (BEAKER) (test  0.13 K/ L  0.00-0.50  



 code=416)      

 

 BASOPHILS ABSOLUTE COUNT (BEAKER) (test  0.06 K/ L  0.00-0.20  



 code=417)      



0.00URINALYSIS W/ [id number] 20:36:00





 Test Item  Value  Reference Range  Comments

 

 COLOR (BEAKER) (test code=470)  Yellow    

 

 CLARITY (BEAKER) (test code=469)  Hazy    

 

 SPECIFIC GRAVITY UA (BEAKER) (test code=468)  1.012  1.001-1.035  

 

 PH UA (BEAKER) (test code=467)  5.5  5.0-8.0  

 

 PROTEIN UA (BEAKER) (test code=464)  100 mg/dL  Negative  

 

 GLUCOSE UA (BEAKER) (test code=365)  Negative  Negative  

 

 KETONES UA (BEAKER) (test code=371)  Negative  Negative  

 

 BILIRUBIN UA (BEAKER) (test code=462)  Negative  Negative  

 

 BLOOD UA (BEAKER) (test code=461)  Moderate  Negative  

 

 NITRITE UA (BEAKER) (test code=465)  Negative  Negative  

 

 LEUKOCYTE ESTERASE UA (BEAKER) (test code=466)  Large  Negative  

 

 UROBILINOGEN UA (BEAKER) (test code=463)  3.0 mg/dL  0.2-1.0  

 

 RBC UA (BEAKER) (test code=519)  19 /HPF    

 

 WBC UA (BEAKER) (test code=520)  182 /HPF    

 

 SOURCE(BEAKER) (test code=0525)      



BASIC METABOLIC [id number] 17:00:00





 Test Item  Value  Reference Range  Comments

 

 SODIUM (BEAKER) (test  140 meq/L  136-145  



 xjlg=548)      

 

 POTASSIUM (BEAKER) (test  3.7 meq/L  3.5-5.1  



 code=379)      

 

 CHLORIDE (BEAKER) (test  112 meq/L    



 code=382)      

 

 CO2 (BEAKER) (test  17 meq/L  22-29  



 code=355)      

 

 BLOOD UREA NITROGEN  23 mg/dL  7-21  



 (BEAKER) (test code=354)      

 

 CREATININE (BEAKER) (test  1.00 mg/dL  0.57-1.25  



 code=358)      

 

 GLUCOSE RANDOM (BEAKER)  102 mg/dL    



 (test code=652)      

 

 CALCIUM (BEAKER) (test  8.7 mg/dL  8.4-10.2  



 code=697)      

 

 EGFR (BEAKER) (test  106 mL/min/1.73 sq m    ESTIMATED GFR IS NOT AS



 code=1092)      ACCURATE AS CREATININE



       CLEARANCE IN PREDICTING



       GLOMERULAR FILTRATION



       RATE. ESTIMATED GFR IS



       NOT APPLICABLE FOR



       DIALYSIS PATIENTS.



Specimen slightly ictericCBC W/PLT COUNT &amp; AUTO [id number] 12:18
:00





 Test Item  Value  Reference Range  Comments

 

 WHITE BLOOD CELL COUNT (BEAKER) (test code=775)  16.4 K/ L  4.0-10.0  

 

 RED BLOOD CELL COUNT (BEAKER) (test code=761)  5.22 M/ L  4.20-5.80  

 

 HEMOGLOBIN (BEAKER) (test code=410)  14.4 GM/DL  13.0-16.8  

 

 HEMATOCRIT (BEAKER) (test code=411)  42.9 %  40.0-50.0  

 

 MEAN CORPUSCULAR VOLUME (BEAKER) (test code=753)  82.2 fL  82.0-98.0  

 

 MEAN CORPUSCULAR HEMOGLOBIN (BEAKER) (test  27.5 pg  27.0-33.0  



 code=751)      

 

 MEAN CORPUSCULAR HEMOGLOBIN CONC (BEAKER) (test  33.5 GM/DL  32.0-36.0  



 code=752)      

 

 RED CELL DISTRIBUTION WIDTH (BEAKER) (test  16.2 %  10.3-14.2  



 code=412)      

 

 PLATELET COUNT (BEAKER) (test code=756)  329 K/CU MM  150-430  

 

 MEAN PLATELET VOLUME (BEAKER) (test code=754)  8.2 fL  6.5-10.5  

 

 NUCLEATED RED BLOOD CELLS (BEAKER) (test  0 /100 WBC  0-0  



 code=413)      

 

 NEUTROPHILS RELATIVE PERCENT (BEAKER) (test  83 %    



 code=429)      

 

 LYMPHOCYTES RELATIVE PERCENT (BEAKER) (test  7 %    



 code=430)      

 

 MONOCYTES RELATIVE PERCENT (BEAKER) (test  9 %    



 code=431)      

 

 EOSINOPHILS RELATIVE PERCENT (BEAKER) (test  0 %    



 code=432)      

 

 BASOPHILS RELATIVE PERCENT (BEAKER) (test  0 %    



 code=437)      

 

 NEUTROPHILS ABSOLUTE COUNT (BEAKER) (test  1.62 K/ L  1.80-8.00  



 code=670)      

 

 LYMPHOCYTES ABSOLUTE COUNT (BEAKER) (test  1.15 K/ L  1.48-4.50  



 code=414)      

 

 MONOCYTES ABSOLUTE COUNT (BEAKER) (test  1.56 K/ L  0.00-1.30  



 code=415)      

 

 EOSINOPHILS ABSOLUTE COUNT (BEAKER) (test  0.01 K/ L  0.00-0.50  



 code=416)      

 

 BASOPHILS ABSOLUTE COUNT (BEAKER) (test  0.02 K/ L  0.00-0.20  



 code=417)      



(MANUAL DIFFERENTIAL)2017 12:18:00





 Test Item  Value  Reference Range  Comments

 

 TOTAL COUNTED (BEAKER) (test code=1351)      

 

 WBC MORPHOLOGY (BEAKER) (test code=487)  Normal    

 

 PLT MORPHOLOGY (BEAKER) (test code=486)  Normal    

 

 RBC MORPHOLOGY (BEAKER) (test code=762)  Normal

## 2018-08-22 NOTE — XMS REPORT
Clinical Summary

 Created on:2018



Patient:Redd Dale

Sex:Male

:1985

External Reference #:[id number]





Demographics







 Address  [address]



   [place] [address]

 

 Home Phone  [phone]

 

 Preferred Language  English

 

 Marital Status  Unknown

 

 Scientology Affiliation  Unknown

 

 Race  Black or 

 

 Ethnic Group  Not  or 









Author







 Organization  [facility]

 

 Address  [address] [place] [address]

 

 Phone  [phone]









Support







 Name  Relationship  Address  Phone

 

 Unavailable  Unavailable  [address]  [phone]



     [place] [address]  









Care Team Providers







 Name  Role  Phone

 

 Unavailable  Primary Care Provider  Unavailable









Allergies







 Active Allergy  Reactions  Severity  Noted Date  Comments

 

 Sulfamethoxazole-Trimethoprim  Hives  High  2017  

 

 Levofloxacin  Hives  High  2017  

 

 Morphine  Hives  High  2017  

 

 Ketorolac  Rash  High  2017  

 

 Vancomycin Analogues  Rash  High  2017  

 

 Sesame Seed  Hives    2017  

 

 Ondansetron Hcl (Pf)  Nausea And Vomiting    2017  







Current Medications







 Prescription  Sig.  Disp.  Refills  Start Date  End Date  Status

 

 oxyCODONE-acetaminophen  Take 1 tablet by          Active



 (PERCOCET)  mg per  mouth every 4          



 tablet  (four) hours as          



   needed for Pain.          







Active Problems







 Problem  Noted Date

 

 Spina bifida (HCC)  2017

 

 Pyelonephritis  2017







Social History







 Tobacco Use  Types  Packs/Day  Years Used  Date

 

 Current Every Day Smoker  Cigarettes  0.5    









 Tobacco Cessation: Ready to Quit: No









 Sex Assigned at Birth  Date Recorded

 

 Not on file  







Last Filed Vital Signs

Not on file



Plan of Treatment

Not on file



Results

Not on fileafter 2017

## 2018-08-22 NOTE — RAD REPORT
EXAM DESCRIPTION:  CT - Abdomen   Pelvis W Contrast - 8/22/2018 8:52 pm

 

CLINICAL HISTORY:  Abdominal pain with nausea.

 

COMPARISON:  2016

 

TECHNIQUE:  Computed axial tomography of the abdomen pelvis was obtained. 100 cc Isovue-300 was admin
istered intravenously. Oral contrast was given.

 

DURING EXAM BETWEEN ARTERIAL AND VENOUS SCAN PT IV STARTED INFILTRATED APPROX. 30-40 CC OF CONTRAST A
ND SALINE. THE TECHNOLOGIST GAVE THE PATIENT INSTRUCTIONS TO KEEP ARM ELEVATED AND COLD COMPRESS APPL
IED TO SITE OF SWELLING.

 

All CT scans are performed using dose optimization technique as appropriate and may include automated
 exposure control or mA/KV adjustment according to patient size.

 

FINDINGS:  The liver is enlarged.

 

The spleen, pancreas, adrenals and kidneys appear unremarkable.

 

The appendix is normal. There is no evidence of diverticulitis.

 

A  shunt has its tips in the pelvis.

 

A tiny umbilical hernia contains fat.

 

Spina bifida is noted.

 

A suprapubic catheter is present within the bladder. The bladder wall is thickened likely related to 
a chronic bladder outlet obstruction.

 

 

IMPRESSION:  No acute abnormality is displayed.

## 2018-08-22 NOTE — P.HP
Certification for Inpatient


Patient admitted to: Inpatient


With expected LOS: >2 Midnights


Practitioner: I am a practitioner with admitting privileges, knowledge of 

patient current condition, hospital course, and medical plan of care.


Services: Services provided to patient in accordance with Admission 

requirements found in Title 42 Section 412.3 of the Code of Federal Regulations





Patient History


Date of Service: 08/22/18


Reason for admission: Intractable back pain


History of Present Illness: 





Mr Dale is a 32-year-old male with history of obesity, spina bifida, bed-

bound with chronic left leg pressure ulcer and osteomyelitis follow-up by Dr. Dorado, neurogenic bladder with suprapubic catheter, who came to the hospital 

complaining of severe back pain starting this today.  The patient also said 

that has not been able to urinate today, and he is concerning that his 

suprapubic catheter is leaking and the urine is backing up.  He denied any fever

, chills or sweating episode at home.  He also denied abdominal pain, nausea or 

vomiting.  In ER lab workup was remarkable for leukocytosis 11.8K, UA abnormal 

as expected.  His chronic pressure ulcer has no more secretions unusual.  He 

was afebrile in the ER, temp was 99.2 F.


Allergies





levofloxacin [From Levaquin] Allergy (Intermediate, Verified 03/23/18 04:43)


 Hives


morphine Allergy (Intermediate, Verified 03/23/18 04:43)


 Hives


sulfamethoxazole [From Bactrim] Allergy (Intermediate, Verified 03/23/18 04:43)


 Hives


ketorolac tromethamine [From Toradol] Allergy (Verified 03/23/18 04:43)


 Nausea/Vomiting


vancomycin Allergy (Verified 03/23/18 04:43)


 Hives


ondansetron [From Zofran (as hydrochloride)] Adverse Reaction (Mild, Verified 03 /23/18 05:52)


 Nausea/Vomiting


amoxicillin [From Augmentin] Adverse Reaction (Verified 03/23/18 04:43)


 Hives/Rash


ciprofloxacin Adverse Reaction (Verified 03/23/18 04:43)


 Nausea/Vomiting


doxycycline Adverse Reaction (Verified 03/23/18 04:43)


 Nausea/Vomiting





Home Medications: 








Oxycodone HCl/Acetaminophen [Oxycodone-Acetaminophen ] 1 tab PO QID 07/21/ 16 








- Past Medical/Surgical History


Diabetic: No


-: Spina bifida


-: History of osteomyelitis


-: History of recurrent UTIs with sepsis


-: Hydrocephalus


-: Cranial to perineal shunt


-: Migraines


-: Chronic indwelling suprapubic catheter


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


-: Shunt revision


-: Cholecystectomy


-: Foot surgery


-: Hip sx BL


-: Bilateral hip surgery


-: Appendectomy


Psychosocial/ Personal History: Patient currently single.  He has no children.  

He is disabled.





- Family History


  ** Father


-: Hypertension





  ** Mother


-: Hypertension





- Social History


Smoking Status: Current every day smoker


Smoking therapy provided: Yes


Patient receptive to therapy: No


Alcohol use: Yes


CD- Drugs: No


Caffeine use: Yes





Review of Systems


10-point ROS is otherwise unremarkable





Physical Examination





- Physical Exam


General: Alert, In no apparent distress


HEENT: Atraumatic, PERRLA, Mucous membr. moist/pink, EOMI, Sclerae nonicteric


Neck: Supple, 2+ carotid pulse no bruit, No LAD, Without JVD or thyroid 

abnormality


Respiratory: Clear to auscultation bilaterally, Normal air movement


Cardiovascular: Regular rate/rhythm, Normal S1 S2


Gastrointestinal: Normal bowel sounds, No tenderness


Musculoskeletal: No tenderness


Integumentary: Skin lesion, Pressure ulcer (Left leg)


Neurological: Normal speech, Normal affect, Other (Paraplegic)


Lymphatics: No axilla or inguinal lymphadenopathy


Urinary: Suprapubic catheter





- Studies


Laboratory Data (last 24 hrs)





08/22/18 17:40: Sodium 136, Potassium 4.0, BUN 10, Creatinine 0.70, Glucose 78, 

Total Bilirubin 0.7, AST 19, ALT 34, Alkaline Phosphatase 130 H, Lipase 50 L


08/22/18 17:40: WBC 11.8 H, Hgb 14.7, Hct 44.8, Plt Count 565 H








Assessment and Plan





- Problems (Diagnosis)


(1) Intractable back pain


Current Visit: Yes   Status: Acute   





(2) Leukocytosis


Onset Date: 10/16/15   Current Visit: No   Status: Acute   


Qualifiers: 


   Leukocytosis type: unspecified   Qualified Code(s): D72.829 - Elevated white 

blood cell count, unspecified   





(3) Chronic indwelling Ortega catheter


Onset Date: 11/03/17   Current Visit: No   Status: Chronic   





(4) Osteomyelitis


Onset Date: 03/23/18   Current Visit: No   Status: Chronic   


Qualifiers: 


   Osteomyelitis type: chronic multifocal   Osteomyelitis location: foot   

Laterality: left   Qualified Code(s): M86.372 - Chronic multifocal osteomyelitis

, left ankle and foot   





(5) Paraplegic spinal paralysis


Onset Date: 07/22/16   Current Visit: No   Status: Chronic   





(6) Spina bifida


Onset Date: 11/03/17   Current Visit: No   Status: Chronic   


Qualifiers: 


   Spinal region: lumbosacral   Presence of hydrocephalus: unspecified 

hydrocephalus presence   Qualified Code(s): Q05.7 - Lumbar spina bifida without 

hydrocephalus   





(7) Stage IV pressure ulcer of heel


Onset Date: 07/22/16   Current Visit: No   Status: Chronic   


Qualifiers: 


   Laterality: left   Qualified Code(s): L89.624 - Pressure ulcer of left heel, 

stage 4   





- Plan





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  CT 

abdomen and pelvis showed no acute abnormality.  UA is abnormal as suspected, 

lactate is normal, WBC are elevated, afebrile.  Procalcitonin is pending.  Will 

exchange suprapubic catheter, cover with empiric antibiotic for possible true 

urinary tract infection.  Consult wound care.





- Advance Directives


Does patient have a Living Will: No


Does patient have a Durable POA for Healthcare: No





- Code Status/Comfort Care


Code Status Assessed: Yes


Code Status: Full Code

## 2018-08-22 NOTE — XMS REPORT
[facility]

 Created on:2018



Patient:Redd Dale

Sex:Male

:1985

External Reference #:[id number]





Demographics







 Address  [address] [place] [address]

 

 Phone  [phone]

 

 Preferred Language  en

 

 Marital Status  Unknown

 

 Latter-day Affiliation  Unknown

 

 Race  Black or 

 

 Ethnic Group  Not  or 









Author







 Organization  [employer] [facility] [employer]









Care Team Providers







 Name  Role  Phone

 

 Knox, Na  Provider Role  Unavailable









Allergies

No Known Allergies



Problems







 Problem Type  Condition  Code  Onset Dates  Condition Status

 

 Problem  Nicotine dependence  F17.200    Active

 

 Problem  Lumbar spina bifida with  Q05.2    Active



   hydrocephalus      

 

 Problem  Dependence on wheelchair  Z99.3    Active

 

 Problem  Fecal incontinence  R15.9    Active

 

 Problem  Foot ulcer  L97.509    Active

 

 Problem  Depression with anxiety  F41.8    Active

 

 Problem  Paraplegia  G82.20    Active

 

 Problem  Constipation  K59.00    Active

 

 Problem  Incontinence of urine  R32    Active

 

 Problem  Nausea alone  R11.0    Active

 

 Problem  Episodic tension-type headache, not  G44.219    Active



   intractable      

 

 Problem  Nonintractable episodic headache,  R51    Active



   unspecified headache type      

 

 Problem   (ventriculoperitoneal) shunt  Z98.2    Active



   status      







Medications

No Known Medications



Results

No Known Results



Summary Purpose

[employer]

## 2018-08-22 NOTE — XMS REPORT
[facility]

 Created on:2018



Patient:Redd Dale

Sex:Male

:1985

External Reference #:[id number]





Demographics







 Address  [address]  [place] [address]

 

 Phone  [phone]

 

 Preferred Language  en

 

 Marital Status  Unknown

 

 Congregational Affiliation  Unknown

 

 Race  Black or 

 

 Ethnic Group  Not  or 









Author







 Organization  [employer]









Care Team Providers







 Name  Role  Phone

 

 Knox, Na  Provider Role  Unavailable









Allergies, Adverse Reactions, Alerts







 Substance  Reaction  Event Type

 

 Toradol  Info Not Available  Drug Allergy

 

 Sulfa  Info Not Available  Drug Allergy

 

 Zofran  Info Not Available  Drug Allergy

 

 Morphine Sulfate ER  Info Not Available  Drug Allergy

 

 Levaquin  Info Not Available  Drug Allergy







Problems







 Problem Type  Condition  Code  Onset Dates  Condition Status

 

 Problem  Nicotine dependence  F17.200    Active

 

 Problem  Lumbar spina bifida with  Q05.2    Active



   hydrocephalus      

 

 Problem  Dependence on wheelchair  Z99.3    Active

 

 Problem  Fecal incontinence  R15.9    Active

 

 Problem  Foot ulcer  L97.509    Active

 

 Problem  Depression with anxiety  F41.8    Active

 

 Problem  Paraplegia  G82.20    Active

 

 Problem  Constipation  K59.00    Active

 

 Problem  Incontinence of urine  R32    Active

 

 Problem  Nausea alone  R11.0    Active

 

 Assessment  Episodic tension-type headache, not  G44.219    Active



   intractable      

 

 Assessment   (ventriculoperitoneal) shunt  Z98.2    Active



   status      

 

 Assessment  Ulcer of left foot, unspecified  L97.529    Active



   ulcer stage      

 

 Assessment  Nonintractable episodic headache,  R51    Active



   unspecified headache type      

 

 Assessment  Depression with anxiety  F41.8    Active

 

 Problem  Episodic tension-type headache, not  G44.219    Active



   intractable      

 

 Assessment  Lumbar spina bifida with  Q05.2    Active



   hydrocephalus      

 

 Problem  Nonintractable episodic headache,  R51    Active



   unspecified headache type      

 

 Assessment  Nausea and vomiting, intractability  R11.2    Active



   of vomiting not specified,      



   unspecified vomiting type      

 

 Problem   (ventriculoperitoneal) shunt  Z98.2    Active



   status      







Medications







 Medication  Code  Code  Instructions  Start  End  Status  Dosage



   System      Date  Date    

 

 Tylenol with  NDC  31959856927  300-60 MG      Active  1 tablet as



 Codeine #4      Orally every 6        needed



       hrs        

 

 Macrobid  NDC  [id number]  100 MG Orally      Active  1 capsule



       every 12 hrs        with food

 

 Pantoprazole  NDC  94302230791  40 MG Orally      Active  1 tablet



 Sodium      Once a day        

 

 Collagenase  NDC  [address]-11  250 UNIT/GM      Active  1 application



       Externally        to affected



       Once a day        area







Results

No Known Results



Summary Purpose

[employer] Submission

## 2018-08-22 NOTE — RAD REPORT
EXAM DESCRIPTION:  RAD - Foot Left 2 View - 8/22/2018 6:47 pm

 

CLINICAL HISTORY:  Left Foot pain and swelling

 

FINDINGS:  No fracture or dislocation is seen.

Soft tissue swelling is seen. No bony destructive lesion is noted. Bones are osteoporotic

## 2018-08-22 NOTE — XMS REPORT
Continuity of Care Document

 Created on:May 8, 2018



Patient:JORDAN VERDIN JR

Sex:Male

:1985

External Reference #:[id number]





Demographics







 Address  [address]  [place] [address]

 

 Phone  [phone]

 

 Phone  [phone]

 

 Preferred Language  Unknown

 

 Marital Status  Unknown

 

 [employer] Affiliation  Unknown

 

 Race  Unknown

 

 Ethnic Group  Unknown









Author







 Organization  [facility]









Problems







 Problem  Status  Onset  Classification  Date  Comments  Source



     Date    Reported    



             



             



             

 

 SHUNT MALFUNCTION  Active  20        [address]



             



             

 

 ACUTE HEADACHE  Active  20        [address]



             



             

 

 Acute pain  Active    Problem  2018    [facility]



             



             

 

 Asthma  Resolved    Problem  2018    [facility]



             



             

 

 Bronchitis  Resolved    Problem  2018    [facility]



             



             

 

 Cerebral palsy  Resolved    Problem  2018    [facility]



             



             

 

 Headache  Active    Problem  2018    [facility]



             



             

 

 Hydrocephalus  Resolved    Problem  2018    [facility]



             



             

 

 Osteomyelitis  Resolved    Problem  2018    [facility]



             



             

 

 HEADACHE  Active          [facility]



             



             







Medications







 Medication  Details  Route  Status  Patient  Ordering  Order  Source



         Instructions  Provider  Date  



               



               



               

 

 Docusate Sodium  100 mg=1 cap,    Active      [facility]



 100 MG Oral  PO, BID, 0          2018  Medical



 Capsule  Refill(s)            [facility]



               



               

 

 Zosyn  0 Refill(s)    Active        [facility] [place]



             2018  Medical



               [facility]



               



               

 

 celecoxib 200  200 mg=1 cap,    Active      [facility]



 mg oral capsule  PO, BID, 0          2018  Medical



   Refill(s)            [facility]



               



               

 

 ascorbic acid  500 mg=1 tab,    Active      [facility] [place]



   PO, BID, 0          2018  Medical



   Refill(s)            Center



               



               

 

 acetaminophen  1,000 mg=2 tab,    Active      [facility]



 500 mg oral  PO, Q6Hnow, 0          2018  Medical



 tablet  Refill(s)            [facility]



               



               

 

 Oxycodone  5 mg=1 tab, PO,    Active      [facility]



 Hydrochloride 5  Q4H, PRN Pain          2018  Medical



 MG Oral Tablet  Score 4-6, 0            Center



   Refill(s)            



               



               

 

 zinc sulfate  220 mg=1 cap,    Active      [facility]



 220 mg oral  PO, Daily, 0          2018  Medical



 capsule  Refill(s)            [facility]



               



               

 

 multivitamin  1 tab, PO,    Active      [facility]



   Daily, 0          2018  Medical



   Refill(s)            Center



               



               

 

 methocarbamol  1,000 mg=2 tab,    Active      [facility]



 500 mg oral  PO, Q8H, 0          2018  Medical



 tablet  Refill(s)            Center



               



               

 

 LORazepam 0.5  0.5 mg=1 tab,    Active         [place]



 mg oral tablet  PO, Q8H, PRN          2018  Medical



   Anxiety, 0            Center



   Refill(s)            



               



               

 

 Lidocaine  3 patch, TOP,    Active        [facility] [place]



 Hydrochloride  Daily, Remove          2018  Medical



 0.05 MG/MG  after 12 hours,            Center



 Transdermal  0 Refill(s)            



 Patch              



 [Lidoderm]              



               



               

 

 Robaxin  1,000 mg, 2    No Longer        [facility]



   tab, Route: PO,    Active      2018  Medical



   Drug form: TAB,            Center



   Q8H, Dosing            



   Weight 127.027,            



   kg, Start date:            



   18            



   16:00:00 CDT,            



   Duration: 30            



   day, Stop date:            



   18            



   8:00:00            



   CDTNotes: (Same            



   as:Robaxin)            



               



               

 

 Oxycodone  10 mg, 2 tab,    No Longer        [facility] [place]



 Hydrochloride 5  Route: PO, Drug    Active      2018  Medical



 MG Oral Tablet  form: TAB,            Center



   Daily, Dosing            



   Weight 127.027,            



   kg, PRN            



   Procedure,            



   Start date:            



   18            



   13:06:00 CDT,            



   Duration: 30            



   day, Stop date:            



   18            



   13:05:00            



   CDTNotes: (Same            



   as: Roxicodone)            



               



               

 

 Ativan  0.5 mg, 1 tab,    No Longer        [facility]



   Route: PO, Drug    Active        Medical



   form: TAB, Q8H,            Center



   Dosing Weight            



   127.027, kg,            



   PRN Anxiety,            



   Start date:            



   18            



   10:18:00 CDT,            



   Duration: 7            



   day, Stop date:            



   18            



   10:17:00            



   CDTNotes: (Same            



   as: Ativan)            



               



               

 

 Trazodone  50 mg, 1 tab,    No Longer        [facility] [place]



 Hydrochloride  Route: PO, Drug    Active      2018  Medical



 50 MG Oral  form: TAB,            Center



 Tablet  Bedtime, Dosing            



   Weight 127.027,            



   kg, Start date:            



   18            



   21:00:00 CDT,            



   Duration: 30            



   day, Stop date:            



   18            



   21:00:00            



   CDTNotes: (Same            



   As: Desyrel)            



               



               

 

 remove patch  3 patch, Route:    No Longer        [facility] [place]



   TOP, Bedtime,    Active        Medical



   Drug form:            Center



   ERFILM, Start            



   date: 18            



   21:00:00 CDT,            



   Duration: 30            



   day, Stop date:            



   18            



   21:00:00            



   CDTNotes:            



   Remove patch 12            



   hours after            



   application            



   each day.            



               



               

 

 Oxycodone  10 mg, 2 tab,    Inactive        [facility] [place]



 Hydrochloride 5  Route: PO, Drug          2018  Medical



 MG Oral Tablet  form: TAB,            Center



   ONCE, Dosing            



   Weight 127.027,            



   kg, Start date:            



   18            



   17:01:00 CDT,            



   Stop date:            



   18            



   17:01:00            



   CDTNotes: (Same            



   as: Roxicodone)            



               



               

 

 Celebrex  200 mg, 1 cap,    No Longer         [place]



   Route: PO, Drug    Active      2018  Medical



   form: CAP, BID,            [facility]



   Dosing Weight            



   127.027, kg,            



   Start date:            



   18            



   17:00:00 CDT,            



   Duration: 30            



   day, Stop date:            



   18            



   9:00:00            



   CDTNotes:            



   NSAID. Please            



   check            



   indication. Not            



   for seizure.            



   (Same As:            



   CeleBREX)            



               



               

 

 Vancomycin  1,500 mg, 250    No Longer         [place]



   mL, Route:    Active      2018  Medical



   IVPB, Drug            Center



   form: INJ,            



   [id number], Dosing            



   Weight 127.27,            



   kg, Start date:            



   18            



   16:00:00 CDT,            



   Stop date:            



   05/10/18            



   8:00:00 CDT,            



   ABX Indication:            



   Skin/Soft            



   Tissue            



   InfectionNotes:            



   TIME CRITICAL            



   MEDICATION Same            



   as: Vancocin-NS            



   (premixed)            



   Infusion rate            



   2001 mg: infuse            



   over 2.5 hours            



               



               

 

 Lidocaine  3 patch, Route:    No Longer         [place]



 Hydrochloride  TOP, Daily,    Active      2018  Medical



 0.05 MG/MG  Drug form:            [facility]



 Transdermal  FILM, Start            



 Patch  date: 18            



 [Lidoderm]  9:00:00 CDT,            



   Duration: 7            



   day, Stop date:            



   18            



   9:00:00 CDT,            



   Remove after 12            



   hoursNotes:            



   Apply only once            



   for up to 12            



   hours in a            



   24-hour period            



   (12 hours on            



   and 12 hours            



   off). (Same as:            



   Lidoderm)            



   "Remove old            



   patch before            



   application of            



   new patch"            



               



               

 

 Phenergan  12.5 mg, 0.5    Inactive         [place]



   mL, Route:          2018  Medical



   IVPB, Drug            Center



   form: INJ,            



   ONCE, Dosing            



   Weight 127.027,            



   kg, Priority:            



   NOW, Start            



   date: 18            



   17:40:00 CDT,            



   Stop date:            



   18            



   17:40:00            



   CDTNotes: Do            



   not give IV            



   push.  (Same            



   as: Phenergan)            



               



               

 

 Dilaudid  0.5 mg, 0.25    Inactive       [place]



   mL, Route: IVP,            Medical



   Drug form: INJ,            Center



   ONCE, Dosing            



   Weight 127.027,            



   kg, Priority:            



   NOW, Start            



   date: 18            



   17:40:00 CDT,            



   Stop date:            



   18            



   17:40:00            



   CDTNotes: Same            



   as Dilaudid            



               



               

 

 Tramadol  100 mg, 2 tab,    No Longer        [facility]



   Route: PO, Drug    Active        Medical



   form: TAB,            Center



   Q6Hnow, Dosing            



   Weight 127.027,            



   kg, Start date:            



   18            



   17:00:00 CDT,            



   Duration: 30            



   day, Stop date:            



   18            



   11:00:00            



   CDTNotes: Not            



   to exceed            



   400mg/day.            



   (Same As:            



   Ultram)            



               

 

 gabapentin  600 mg, 2 cap,    No Longer        [facility]



   Route: PO, Drug    Active        Medical



   form: CAP,            Center



   Q8Hnow, Dosing            



   Weight 127.027,            



   kg, Start date:            



   18            



   17:00:00 CDT,            



   Stop date:            



   18            



   9:00:00            



   CDTNotes: (Same            



   as: Neurontin)            



               



               

 

 Acetaminophen  1,000 mg, 2    No Longer        [facility]



   tab, Route: PO,    Active        Medical



   Drug form: TAB,            Center



   Q6Hnow, Dosing            



   Weight 127.027,            



   kg, Start date:            



   18            



   17:00:00 CDT,            



   Duration: 30            



   day, Stop date:            



   18            



   11:00:00            



   CDTNotes: Max            



   acetaminophen            



   4000 mg/day (4            



   gm/day).  (Same            



   as: Tylenol            



   Extra Strength)            



               



               

 

 Robaxin  500 mg, 1 tab,    No Longer        [facility]



   Route: PO, Drug    Active        Medical



   form: TAB, TID,            Center



   Dosing Weight            



   127.027, kg,            



   Start date:            



   18            



   17:00:00 CDT,            



   Duration: 30            



   day, Stop date:            



   18            



   13:00:00            



   CDTNotes: (Same            



   as:Robaxin)            



               



               

 

 Oxycodone  5 mg, 1 tab,    No Longer        [facility] [place]



 Hydrochloride 5  Route: PO, Drug    Active      2018  Medical



 MG Oral Tablet  form: TAB, Q4H,            Center



   Dosing Weight            



   127.027, kg,            



   PRN Pain Score            



   4-6, Start            



   date: 18            



   16:33:00 CDT,            



   Duration: 30            



   day, Stop date:            



   18            



   16:32:00            



   CDTNotes: (Same            



   as: Roxicodone)            



               



               

 

 Beneprotein 7  2 pkt, Route:    No Longer         [place]



 gm pkt  PO, Drug Form:    Active      2018  Medical



   PWDR, Dosing            Center



   Weight 127.027,            



   kg, BID-Before            



   Meals, Start            



   date: 18            



   16:30:00 CDT,            



   Duration: 30            



   day, Stop date:            



   18            



   7:30:00            



   CDTNotes: (Same            



   as:            



   Beneprotein)            



               



               

 

 Acetaminophen  1 tab, PO, TID,    No Longer         [place]



 325 MG /  0 Refill(s)    Active      2018  Medical



 Oxycodone              Center



 Hydrochloride              



 10 MG Oral              



 Tablet              



 [Percocet              



 10/325]              



               

 

 Dilaudid  0.5 mg, 0.25    Inactive         [place]



   mL, Route: IVP,            Medical



   Drug form: INJ,            Center



   ONCE, Start            



   date: 18            



   11:01:00 CDT,            



   Stop date:            



   18            



   11:01:00 CDT            



               



               

 

 Phenergan  25 mg, 1 tab,    Inactive        [facility]



   Route: PO, Drug          2018  Medical



   form: TAB, Q6H,            Center



   Dosing Weight            



   127.027, kg,            



   PRN Nausea &            



   Vomiting, Start            



   date: 18            



   10:43:00 CDT,            



   Duration: 30            



   day, Stop date:            



   18            



   10:42:00            



   CDTNotes: (Same            



   as: Phenergan)            



               



               

 

 Dilaudid  2 mg, Route:    Inactive         [place]



   IVP, ONCE,          2018  Medical



   Dosing Weight            Center



   127.027, kg,            



   Priority: STAT,            



   Start date:            



   18            



   10:43:00 CDT,            



   Stop date:            



   18            



   10:43:00 CDT            



               



               

 

 Docusate  100 mg, 1 cap,    No Longer        [facility]



   Route: PO, Drug    Active      2018  Medical



   form: CAP, BID,            Center



   Dosing Weight            



   127.27, kg,            



   Start date:            



   18            



   9:00:00 CDT,            



   Duration: 30            



   day, Stop date:            



   18            



   17:00:00            



   CDTNotes: (Same            



   as: Colace) (Do            



   Not Crush)            



               



               

 

 Zinc Sulfate  220 mg, 1 cap,    No Longer        [facility]



   Route: PO, Drug    Active      2018  Medical



   form: CAP,            Center



   Daily, Dosing            



   Weight 127.27,            



   kg, Start date:            



   18            



   9:00:00 CDT,            



   Duration: 30            



   day, Stop date:            



   18            



   9:00:00            



   CDTNotes: (Zinc            



   sulfate            



   capsule) - 220            



   mg Zinc            



   sulfate=50 mg            



   elemental zinc            



   Same as Zinc            



   Sulfate            



               

 

 ascorbic acid  500 mg, 1 tab,    No Longer        [facility]



   Route: PO, Drug    Active        Medical



   form: TAB, BID,            Center



   Dosing Weight            



   127.27, kg,            



   Start date:            



   18            



   9:00:00 CDT,            



   Duration: 30            



   day, Stop date:            



   18            



   17:00:00            



   CDTNotes: (Same            



   as: Vitamin C)            



               



               

 

 multivitamin  1 tab, Route:    No Longer        [facility] [place]



   PO, Drug Form:    Active      2018  Medical



   TAB, Dosing            Center



   Weight 127.27,            



   kg, Daily,            



   Start date:            



   18            



   9:00:00 CDT,            



   Duration: 30            



   day, Stop date:            



   18            



   9:00:00            



   CDTNotes: (Same            



   as:Thera)            



   WASTE: F/P -            



   Black; E -            



   Municipal Trash            



   Bin  Take with            



   food.            



               

 

 Naproxen  500 mg, 1 tab,    Inactive       [place]



   Route: PO, Drug            Medical



   form: TAB,            Center



   [id number], Dosing            



   Weight 127.27,            



   kg, Start date:            



   18            



   2:00:00 CDT,            



   Duration: 30            



   day, Stop date:            



   18            



   14:00:00            



   CDTNotes: (Same            



   as: Naprosyn)            



   Take with food.            



               



               

 

 Zosyn  3.375 gm,    No Longer        [facility]



   Route: IVPB,    Active      2018  Medical



   Drug form:            Center



   PDR/INJ,            



   ABXQ8H, Dosing            



   Weight 127.27,            



   kg, Start date:            



   18            



   2:00:00 CDT,            



   Stop date:            



   05/10/18            



   10:00:00 CDT,            



   ABX Indication:            



   Skin/Soft            



   Tissue            



   InfectionNotes:            



   (Same as:            



   Zosyn) Dosing            



   based on            



   Piperacillin            



   component   ***            



   MEDICATION            



   WASTE ***            



   Product Size:            



   3375 mg Product            



   Wasted:  ___ mg            



               



               

 

 Vancomycin  1,000 mg,    No Longer         [place]



   Route: IVPB,    Active        Medical



   Drug form: INJ,            Center



   ABXQ8H, Dosing            



   Weight 127.27,            



   kg, Start date:            



   18            



   2:00:00 CDT,            



   Duration: 7            



   day, Stop date:            



   05/10/18            



   18:00:00 CDT,            



   ABX Indication:            



   Skin/Soft            



   Tissue            



   InfectionNotes:            



   TIME CRITICAL            



   MEDICATION            



   (Same As:            



   Vancocin)            



   Infusion rate            



   2001 mg: infuse            



   over 2.5 hours            



   For adult            



   patients only:            



   Round to            



   nearest 250 mg            



   per Medical            



   Staff approval            



    *** MEDICATION            



   WASTE ***            



   Product Size:            



   1000 mg Product            



   Wasted:  ___ mg            



               



               

 

 Enoxaparin  40 mg, 0.4 mL,    No Longer         [place]



   Route: SUB-Q,    Active        Medical



   Drug form: INJ,            Center



   [id number],            



   Dosing Weight            



   127.27, kg,            



   Consider for            



   obese patients,            



   Start date:            



   18            



   2:00:00 CDT,            



   Stop date:            



   18            



   14:00:00            



   CDTNotes: (Same            



   as: Lovenox)            



               



               

 

 Sodium Chloride  1,000 mL, Rate:    No Longer         [place]



 0.9% IV 1,000  125 ml/hr,    Active        Medical



 mL  Infuse over: 8            Center



   hr, Route: IV,            



   Dosing Weight            



   127.27 kg,            



   Total Volume:            



   1,000, Start            



   date: 18            



   1:46:00 CDT,            



   Duration: 30            



   day, Stop date:            



   18            



   1:45:00 CDT,            



   2.44, m2            



               

 

 Saline Flush  10 ml, Route:    No Longer         [place]



 0.9%  IVP, Drug Form:    Active        Medical



   INJ, Dosing            Center



   Weight 127.27,            



   kg, PRN, PRN            



   Line Flush,            



   Start date:            



   18            



   1:46:00 CDT,            



   Duration: 30            



   day, Stop date:            



   18            



   1:45:00            



   CDTNotes: (Same            



   as: BD            



   Posiflush)            



               



               

 

 Acetaminophen  325 mg, 1 tab,    Inactive         [place]



   Route: PO, Drug          2018  Medical



   form: TAB, Q4H,            Center



   Dosing Weight            



   127.27, kg, PRN            



   Pain Score 4-6,            



   Start date:            



   18            



   1:46:00 CDT,            



   Duration: 30            



   day, Stop date:            



   18            



   1:45:00            



   CDTNotes: Do            



   not exceed 4            



   gm/day.  (Same            



   as: Tylenol)            



               



               

 

 Acetaminophen  1 tab, Route:    Inactive         [place]



 325 MG /  PO, Drug Form:          2018  Medical



 Hydrocodone  TAB, Dosing            Center



 Bitartrate 5 MG  Weight 127.27,            



 Oral Tablet  kg, Q4H, PRN            



   Pain Score 4-6,            



   Start date:            



   18            



   1:46:00 CDT,            



   Duration: 30            



   day, Stop date:            



   18            



   1:45:00            



   CDTNotes: (Same            



   as: Norco            



   325/5)  Do not            



   exceed 4gm/day            



   of            



   acetaminophen.            



               



               

 

 Reglan  10 mg, 2 mL,    Inactive         [place]



   Route: IVP,            Medical



   Drug form: INJ,            Center



   ONCE, Dosing            



   Weight 127.273,            



   kg, Priority:            



   STAT, Start            



   date: 18            



   23:27:00 CDT,            



   Stop date:            



   18            



   23:27:00            



   CDTNotes: (Same            



   as: Reglan)            



               



               

 

 Benadryl  25 mg, 0.5 mL,    Inactive         [place]



   Route: IVP,          2018  Medical



   Drug form: INJ,            Center



   ONCE, Dosing            



   Weight 127.273,            



   kg, Priority:            



   STAT, Start            



   date: 18            



   23:27:00 CDT,            



   Stop date:            



   18            



   23:27:00            



   CDTNotes: (Same            



   as: Benadryl)            



               



               

 

 Magnesium  2 gm, 50 mL,    Inactive        [facility]



 Sulfate  Route: IV, Drug            Medical



   form: INJ,            Center



   ONCE, Dosing            



   Weight 127.273,            



   kg, Priority:            



   STAT, Start            



   date: 18            



   23:26:00 CDT,            



   Stop date:            



   18            



   23:26:00            



   CDTNotes:            



   WASTE: F/P -            



   Sink; E -            



   Municipal Trash            



   Bin            



               

 

 Sodium Chloride  1,000 mL, 1000    Inactive        [facility] [place]



 0.9% (Bolus) IV  ml/hr, Infuse          2018  Medical



   Over: 1 hr,            Center



   Route: IV,            



   1,000, Drug            



   form: INJ,            



   ONCE, Priority:            



   STAT, Dosing            



   Weight 127.273            



   kg, Start date:            



   18            



   23:26:00 CDT,            



   Stop date:            



   18            



   23:26:00 CDT            



               



               

 

 Zosyn  4.5 gm, Route:    Inactive         [place]



   IVPB, ONCE,          2018  Medical



   Dosing Weight            Center



   127.273, kg,            



   Priority: STAT,            



   Start date:            



   18            



   23:10:00 CDT,            



   Stop date:            



   18            



   23:10:00 CDT,            



   ABX Indication:            



   Bacteremia            



               



               

 

 Vancomycin  2,000 mg,    Inactive         [place]



   Route: IVPB,          2018  [facility]



   ONCE, Dosing            Center



   Weight 127.273,            



   kg, Priority:            



   STAT, Start            



   date: 18            



   23:10:00 CDT,            



   Stop date:            



   18            



   23:10:00 CDT,            



   ABX Indication:            



   BacteremiaNotes            



   : TIME CRITICAL            



   MEDICATION            



   (Same As:            



   Vancocin)            



   Infusion rate            



   2001 mg: infuse            



   over 2.5 hours            



   For adult            



   patients only:            



   Round to            



   nearest 250 mg            



   per Medical            



   Staff approval            



    *** MEDICATION            



   WASTE ***            



   Product Size:            



   1000 mg Product            



   Wasted:  ___ mg            



               



               

 

 normal saline  1,000 mL, Rate:    No Longer       [place]



 0.9% IV 1,000  75 ml/hr,    Active      2018  Medical



 mL  Infuse over:            Center



   13.3 hr, Route:            



   IV, Dosing            



   Weight 127.273            



   kg, Total            



   Volume: 1,000,            



   Start date:            



   18            



   22:39:00 CDT,            



   Duration: 30            



   day, Stop date:            



   18            



   22:38:00 CDT,            



   2.36, m2            



               

 

 Acetaminophen  1,000 mg, 2    Inactive         [place]



   tab, Route: PO,          2018  Medical



   Drug form: TAB,            Center



   ONCE, Dosing            



   Weight 127.273,            



   kg, Start date:            



   18            



   21:56:00 CDT,            



   Stop date:            



   18            



   21:56:00            



   CDTNotes: Max            



   acetaminophen            



   4000 mg/day (4            



   gm/day).  (Same            



   as: Tylenol            



   Extra Strength)            



               



               

 

 Rocephin  1 gm, Route:    Inactive      05/04[facility]



   IVP, Drug form:          2018  Medical



   PDR/INJ, ONCE,            Center



   Dosing Weight            



   127.273, kg,            



   Priority: STAT,            



   Start date:            



   18            



   21:21:00 CDT,            



   Stop date:            



   18            



   21:21:00 CDT,            



   ABX Indication:            



   Urinary Tract            



   InfectionNotes:            



   (Same As:            



   Rocephin).            



   *** MEDICATION            



   WASTE ***            



   Product Size:            



   1000 mg Product            



   Wasted:  _0__            



   mg            



               

 

 Morphine  4 mg, Route:    Inactive      [facility]



   IVP, ONCE,          2018  Medical



   Dosing Weight            Center



   127.273, kg,            



   Priority: STAT,            



   Start date:            



   18            



   19:05:00 CDT,            



   Stop date:            



   18            



   19:05:00 CDT            



               



               

 

 Benadryl  25 mg, 0.5 mL,    Inactive      [facility]



   Route: IVP,            Medical



   Drug form: INJ,            Center



   ONCE, Dosing            



   Weight 127.273,            



   kg, Priority:            



   STAT, Start            



   date: 18            



   18:14:00 CDT,            



   Stop date:            



   18            



   18:14:00            



   CDTNotes: (Same            



   as: Benadryl)            



               



               

 

 Benadryl  50 mg, 2 cap,    Inactive      [facility]



   Route: PO, Drug            Medical



   form: CAP,            Center



   ONCE, Dosing            



   Weight 127.273,            



   kg, Priority:            



   STAT, Start            



   date: 18            



   17:56:00 CDT,            



   Stop date:            



   18            



   17:56:00            



   CDTNotes: (Same            



   as: Benadryl)            



               



               

 

 Morphine  4 mg, 1 mL,    Inactive      [facility]



   Route: IVP,            Medical



   Drug form:            Center



   SOLN, ONCE,            



   Dosing Weight            



   127.273, kg,            



   Priority: STAT,            



   Start date:            



   18            



   17:56:00 CDT,            



   Stop date:            



   18            



   17:56:00 CDT            



               



               







Allergies, Adverse Reactions, Alerts







 Substance  Category  Reaction  Severity  Reaction  Status  Date  Comments  
Source



         type    Reported    



                 



                 



                 

 

 amoxicillin  Assertion      Drug  Active      [facility]



                 



                 

 

 morphine  Assertion      Drug  Active      [facility]



                 



                 

 

 Toradol  Assertion      Drug  Active      [facility]



                 



                 

 

 Minocin  Assertion      Drug  Active      [facility]



                 



                 

 

 Zofran  Assertion      Drug  Active      [facility]



                 



                 

 

 Levaquin  Assertion      Drug  Active      [facility]



                 



                 

 

 Bactrim  Assertion      Drug  Active      [facility]



                 



                 

 

 NKDA  Assertion      Drug  Active      [facility]



                 



                 







Immunizations







 Immunization  Date Given  Site  Status  Last Updated  Comments  Source



             



             







Results







 Order Name  Results  Value  Reference  Date  Interpretation  Comments  Source



       Range        



               



               



               

 

 CHEM PANEL  B/C Ratio  17  6 - 25        [address]



               



               



               

 

 CHEM PANEL  Globulin  4.3 g/dL  2.7 - 4.2        [address]



               



               



               

 

 CHEM PANEL  A/G Ratio  0.7  0.7 - 1.6        [address]



               



               



               

 

 CHEM PANEL  AGAP  14.4 meq/L  10.0 -        [facility]



       20.0        [facility]



               



               



               

 

 CHEM PANEL  eGFR  113        Result Comment: The eGFR is calculated using 
the CKD-EPI formula. In most young, healthy individuals the eGFR will be >90 mL/
min/1.73m2. The eGFR declines with age. An eGFR of 60-89 may be normal in  [facility] 
[place]



     mL/min/1.7        some populations, particularly the elderly, for 
whom the CKD-EPI formula has not been extensively validated. Use of the eGFR is 
not recommended in the following populations:  [address]



             Individuals with unstable creatinine concentrations, including 
pregnant patients and those with serious co-morbid conditions.  



               



             Patients with extremes in muscle mass or diet.  



               



             The data above are obtained from the National Kidney Disease 
Education Program (NKDEP) which additionally recommends that when the eGFR is 
used in patients with extremes of body mass index for purposes  



             of drug dosing, the eGFR should be multiplied by the estimated 
BMI.  

 

 CHEM PANEL  Alk Phos  76 unit/L  39 - 136        [address]



               



               



               

 

 CHEM PANEL  ALT  35 unit/L  0 - 65        [address]



               



               



               

 

 CHEM PANEL  Albumin Lvl  2.8 g/dL  3.5 - 5.0        [address]



               



               



               

 

 CHEM PANEL  Total Protein  7.1 g/dL  6.4 - 8.4        [address]



               



               



               

 

 CHEM PANEL  Calcium Lvl  8.7 mg/dL  8.5 - 10.5        [address]



               



               



               

 

 CHEM PANEL  AST  18 unit/L  0 - 37        [address]



               



               



               

 

 CHEM PANEL  Bili Total  0.3 mg/dL  0.2 - 1.3        [address]



               



               



               

 

 CHEM PANEL  Potassium Lvl  4.4 meq/L  3.5 - 5.1         [place]



         /[address] [place] [address]



               



               



               

 

 CHEM PANEL  Chloride Lvl  109 meq/L  95 - 109  05       [place]



               [facility]



               



               



               

 

 CHEM PANEL  CO2  23 meq/L  24 - 32         [place]



         [address]



               



               



               

 

 CHEM PANEL  Glucose Lvl  114 mg/dL  70 - 99        [address]



               



               



               

 

 CHEM PANEL  Creatinine  1.01 mg/dL  0.50 -         [place]



   Lvl    1.40  /      [facility]



               



               



               

 

 CHEM PANEL  BUN  17 mg/dL  7 - 22         [place]



         [address]



               



               



               

 

 CHEM PANEL  Sodium Lvl  142 meq/L  135 - 145  05      [address]



               



               



               

 

 HEMATOLOGY  Basophils  0.6 %  0.0 - 1.0        [address]



               



               



               

 

 HEMATOLOGY  Segs-Bands #  4.8 K/CMM  1.5 - 8.1        [address]



               



               



               

 

 HEMATOLOGY  Monocytes #  0.7 K/CMM  0.0 - 0.8        [facility]



         [address] [place] [address]



               



               



               

 

 HEMATOLOGY  Lymphocytes #  1.9 K/CMM  1.0 - 5.5        [address]



               



               



               

 

 HEMATOLOGY  Monocytes  9.4 %  2.0 - 12.0        [address]



               



               



               

 

 HEMATOLOGY  Eosinophils #  0.2 K/CMM  0.0 - 0.5        [address]



               



               



               

 

 HEMATOLOGY  Eosinophils  2.9 %  0.0 - 4.0        [address]



               



               



               

 

 HEMATOLOGY  Segs  62.2 %  45.0 -         [place]



       75.0        [facility]



               



               



               

 

 HEMATOLOGY  Lymphocytes  24.9 %  20.0 -         [place]



       40.0        [facility]



               



               



               

 

 HEMATOLOGY  MCH  27.5 pg  27.0 -         [place]



       31.0        [facility]



               



               



               

 

 HEMATOLOGY  MCV  85.3 fL  80.0 -         [place]



       94.0        [facility]



               



               



               

 

 HEMATOLOGY  Hct  43.9 %  42.0 -         [place]



       54.0        [facility]



               



               



               

 

 HEMATOLOGY  Hgb  14.2 g/dL  14.0 -         [place]



       18.0        [facility]



               



               



               

 

 HEMATOLOGY  WBC  7.7 K/CMM  3.7 - 10.4        [address]



               



               



               

 

 HEMATOLOGY  RBC  5.15 M/CMM  4.70 -        [facility] [place]



       6.10        [facility]



               



               



               

 

 HEMATOLOGY  MPV  8.4 fL  7.4 - 10.4         [place]



         /2018      [facility]



               



               



               

 

 HEMATOLOGY  MCHC  32.3 g/dL  32.0 -        [facility]



       36.0        [facility]



               



               



               

 

 HEMATOLOGY  RDW  17.3 %  11.5 -        [facility]



       14.5        [facility]



               



               



               

 

 HEMATOLOGY  Platelet  317 K/CMM  133 - 450         [place]



               [facility]



               



               



               

 

 CHEM PANEL  Globulin  4.4 g/dL  2.7 - 4.2         [place]



         [address]



               



               



               

 

 CHEM PANEL  A/G Ratio  0.6  0.7 - 1.6         [place]



         [address]



               



               



               

 

 CHEM PANEL  B/C Ratio  17  6 - 25         [place]



               [facility]



               



               



               

 

 CHEM PANEL  AGAP  11.3 meq/L  10.0 -        [facility]



       20.0        [facility]



               



               



               

 

 CHEM PANEL  eGFR  134        Result Comment: The eGFR is calculated using 
the CKD-EPI formula. In most young, healthy individuals the eGFR will be >90 mL/
min/1.73m2. The eGFR declines with age. An eGFR of 60-89 may be normal in  [facility] 
[place]



     mL/min/1.    some populations, particularly the elderly, for 
whom the CKD-EPI formula has not been extensively validated. Use of the eGFR is 
not recommended in the following populations:  [address]



             Individuals with unstable creatinine concentrations, including 
pregnant patients and those with serious co-morbid conditions.  



               



             Patients with extremes in muscle mass or diet.  



               



             The data above are obtained from the National Kidney Disease 
Education Program (NKDEP) which additionally recommends that when the eGFR is 
used in patients with extremes of body mass index for purposes  



             of drug dosing, the eGFR should be multiplied by the estimated 
BMI.  

 

 CHEM PANEL  Creatinine  0.84 mg/dL  0.50 -        [facility]



   Lvl    1.40        [facility]



               



               



               

 

 CHEM PANEL  Sodium Lvl  142 meq/L  135 - 145         [place]



         [address]



               



               



               

 

 CHEM PANEL  Glucose Lvl  99 mg/dL  70 - 99         [place]



         [address]



               



               



               

 

 CHEM PANEL  BUN  14 mg/dL  7 - 22        [address]



               



               



               

 

 CHEM PANEL  Alk Phos  79 unit/L  39 - 136         [place]



         [address]



               



               



               

 

 CHEM PANEL  Bili Total  0.3 mg/dL  0.2 - 1.3        [address]



               



               



               

 

 CHEM PANEL  AST  14 unit/L  0 - 37  05      [address]



               



               



               

 

 CHEM PANEL  ALT  43 unit/L  0 - 65        [address]



               



               



               

 

 CHEM PANEL  Total Protein  7.1 g/dL  6.4 - 8.4        [address]



               



               



               

 

 CHEM PANEL  Albumin Lvl  2.7 g/dL  3.5 - 5.0        [address]



               



               



               

 

 CHEM PANEL  Calcium Lvl  9.2 mg/dL  8.5 - 10.5        [address]



               



               



               

 

 CHEM PANEL  CO2  21 meq/L  24 - 32        [address]



               



               



               

 

 CHEM PANEL  Potassium Lvl  4.3 meq/L  3.5 - 5.1        [address]



               



               



               

 

 CHEM PANEL  Chloride Lvl  114 meq/L  95 - 109        [address]



               



               



               

 

 HEMATOLOGY  MCHC  32.5 g/dL  32.0 -        [facility]



       36.0        [facility]



               



               



               

 

 HEMATOLOGY  RDW  17.5 %  11.5 -        [facility]



       14.5        [facility]



               



               



               

 

 HEMATOLOGY  Platelet  400 K/CMM  133 - 450        [address]



               



               



               

 

 HEMATOLOGY  MPV  8.5 fL  7.4 - 10.4        [address]



               



               



               

 

 HEMATOLOGY  WBC  6.6 K/CMM  3.7 - 10.4        [address]



               



               



               

 

 HEMATOLOGY  RBC  5.17 M/CMM  4.70 -        [facility]



       6.10        [facility]



               



               



               

 

 HEMATOLOGY  MCV  86.1 fL  80.0 -         [place]



       94.0        [facility]



               



               



               

 

 HEMATOLOGY  Hct  44.5 %  42.0 -         [place]



       54.0        [facility]



               



               



               

 

 HEMATOLOGY  MCH  28.0 pg  27.0 -        [facility]



       31.0        [facility]



               



               



               

 

 HEMATOLOGY  Hgb  14.5 g/dL  14.0 -        [facility]



       18.0        [facility]



               



               



               

 

 HEMATOLOGY  Lymphocytes #  1.7 K/CMM  1.0 - 5.5        [address]



               



               



               

 

 HEMATOLOGY  Monocytes #  0.7 K/CMM  0.0 - 0.8        [address]



               



               



               

 

 HEMATOLOGY  Eosinophils #  0.2 K/CMM  0.0 - 0.5        [address]



               



               



               

 

 HEMATOLOGY  Lymphocytes  26.1 %  20.0 -         [place]



       40.0        [facility]



               



               



               

 

 HEMATOLOGY  Segs  59.3 %  45.0 -        [facility]



       75.0        [facility]



               



               



               

 

 HEMATOLOGY  Basophils  0.8 %  0.0 - 1.0        [address]



               



               



               

 

 HEMATOLOGY  Monocytes  10.5 %  2.0 - 12.0        [address]



               



               



               

 

 HEMATOLOGY  Eosinophils  3.3 %  0.0 - 4.0        [address]



               



               



               

 

 HEMATOLOGY  Segs-Bands #  3.9 K/CMM  1.5 - 8.1        [address]



               



               



               

 

 CHEM PANEL  Glucose Lvl  74 mg/dL  70 - 99        [address]



               



               



               

 

 CHEM PANEL  BUN  12 mg/dL  7 - 22        [address]



               



               



               

 

 CHEM PANEL  Creatinine  0.75 mg/dL  0.50 -        [facility]



   Lvl    1.40        [facility]



               



               



               

 

 CHEM PANEL  eGFR  140        Result Comment: The eGFR is calculated using 
the CKD-EPI formula. In most young, healthy individuals the eGFR will be >90 mL/
min/1.73m2. The eGFR declines with age. An eGFR of 60-89 may be normal in  [facility] 
[place]



     mL/min/1.7        some populations, particularly the elderly, for 
whom the CKD-EPI formula has not been extensively validated. Use of the eGFR is 
not recommended in the following populations:  [address]



             Individuals with unstable creatinine concentrations, including 
pregnant patients and those with serious co-morbid conditions.  



               



             Patients with extremes in muscle mass or diet.  



               



             The data above are obtained from the National Kidney Disease 
Education Program (NKDEP) which additionally recommends that when the eGFR is 
used in patients with extremes of body mass index for purposes  



             of drug dosing, the eGFR should be multiplied by the estimated 
BMI.  

 

 CHEM PANEL  Albumin Lvl  2.9 g/dL  3.5 - 5.0        [facility]



         [address] [place] [address]



               



               



               

 

 CHEM PANEL  Globulin  4.4 g/dL  2.7 - 4.2        [address]



               



               



               

 

 CHEM PANEL  A/G Ratio  0.7  0.7 - 1.6        [address]



               



               



               

 

 CHEM PANEL  Bili Total  0.4 mg/dL  0.2 - 1.3        [address]



               



               



               

 

 CHEM PANEL  Alk Phos  71 unit/L  39 - 136  05/       [place]



         /[address] [place] [address]



               



               



               

 

 CHEM PANEL  AST  15 unit/L  0 - 37  05      [address]



               



               



               

 

 CHEM PANEL  ALT  28 unit/L  0 - 65        [address]



               



               



               

 

 CHEM PANEL  Potassium Lvl  4.4 meq/L  3.5 - 5.1        [address]



               



               



               

 

 CHEM PANEL  Sodium Lvl  147 meq/L  135 - 145  05      [address]



               



               



               

 

 CHEM PANEL  CO2  25 meq/L  24 - 32  05      [address]



               



               



               

 

 CHEM PANEL  Chloride Lvl  114 meq/L  95 - 109        [address]



               



               



               

 

 CHEM PANEL  B/C Ratio  16  6 - 25        [address]



               



               



               

 

 CHEM PANEL  Calcium Lvl  8.7 mg/dL  8.5 - 10.5        [address]



               



               



               

 

 CHEM PANEL  AGAP  12.4 meq/L  10.0 -         [place]



       20.0        [facility]



               



               



               

 

 CHEM PANEL  Total Protein  7.3 g/dL  6.4 - 8.4        [facility]



         [address] [place] [address]



               



               



               

 

 HEMATOLOGY  Platelet  345 K/CMM  133 - 450  05      [facility]



         [address] [place] [address]



               



               



               

 

 HEMATOLOGY  MPV  8.7 fL  7.4 - 10.4        [address]



               



               



               

 

 HEMATOLOGY  WBC  6.9 K/CMM  3.7 - 10.4        [address]



               



               



               

 

 HEMATOLOGY  RBC  5.30 M/CMM  4.70 -         [place]



       6.10        [facility]



               



               



               

 

 HEMATOLOGY  [facility]  32.8 g/dL  32.0 -         [place]



       36.0        [facility]



               



               



               

 

 HEMATOLOGY  MCH  27.9 pg  27.0 -         [place]



       31.0        [facility]



               



               



               

 

 HEMATOLOGY  Hgb  14.8 g/dL  14.0 -  05       [place]



       18.0        [facility]



               



               



               

 

 HEMATOLOGY  MCV  85.0 fL  80.0 -         [place]



       94.0        [facility]



               



               



               

 

 HEMATOLOGY  Hct  45.1 %  42.0 -        [facility]



       54.0        [facility]



               



               



               

 

 HEMATOLOGY  RDW  17.5 %  11.5 -  05       [place]



       14.5        [facility]



               



               



               

 

 HEMATOLOGY  Eosinophils #  0.2 K/CMM  0.0 - 0.5        [address]



               



               



               

 

 HEMATOLOGY  Lymphocytes #  2.0 K/CMM  1.0 - 5.5  05/      [address]



               



               



               

 

 HEMATOLOGY  Monocytes #  0.7 K/CMM  0.0 - 0.8  05      [address]



               



               



               

 

 HEMATOLOGY  Eosinophils  2.4 %  0.0 - 4.0  05      [address]



               



               



               

 

 HEMATOLOGY  Basophils  0.6 %  0.0 - 1.0        [address]



               



               



               

 

 HEMATOLOGY  Segs-Bands #  4.0 K/CMM  1.5 - 8.1        [address]



               



               



               

 

 HEMATOLOGY  Monocytes  10.4 %  2.0 - 12.0        [address]



               



               



               

 

 HEMATOLOGY  RBC Morph  Normal          [address]



     (18 2:07 AM)          [facility]



               



               



               

 

 HEMATOLOGY  Segs  58.1 %  45.0 -  05      [facility]



       75.0        [facility]



               



               



               

 

 HEMATOLOGY  Plt Morph  Normal          [address]



     (18 2:07 AM)          [facility]



               



               



               

 

 HEMATOLOGY  Lymphocytes  28.5 %  20.0 -        [facility]



       40.0        [facility]



               



               



               

 

 HEMATOLOGY  Basophils #  0.1 K/CMM  0.0 - 0.2  05      [address]



               



               



               

 

 HEMATOLOGY  Polychrom  Moderate  None Seen        [facility]



         2018      Medical



     *ABN*          Center



               



     (18 11:07 AM)          



               

 

 CHEM PANEL  Magnesium Lvl  2.3 mg/dL  1.8 - 2.4        [address]



               



               



               

 

 CHEM PANEL  Phosphorus  3.5 mg/dL  2.5 - 4.5        [address]



               



               



               

 

 HEMATOLOGY  PT  14.0 s  12.0 -        [facility]



       14.7        [facility]



               



               



               

 

 HEMATOLOGY  PTT  37.6 s  22.9 -        [facility]



       35.8        [facility]



               



               



               

 

 HEMATOLOGY  INR  1.08  0.85 -        [facility]



       1.17        [facility]



               



               



               

 

 IMMUNOLOGY  C-REACTIVE  13.1 mg/L  <=2.9 mg/L        [facility]



   PROTEIN      [address] [place] [address]



               



               



               

 

 IMMUNOLOGY  Prealbumin  25.2 mg/dL  18.0 -  05/04       [place]



       45.0        [facility]



               



               



               

 

 CHEM PANEL  Lactic Acid  0.9 mMol/L  0.5 - 2.2        [facility]



   Lvl            [facility]



               



               



               

 

 HEMATOLOGY  Sed Rate  5 mm/h  0 - 15        [facility] [place]



         /2018      [facility]



               



               



               

 

 IMMUNOLOGY  C-REACTIVE  15.8 mg/L  <=2.9 mg/L        [facility]



   PROTEIN            [facility]



               



               



               

 

 HEMATOLOGY  PT  13.0 s  12.0 -         [place]



       14.7        [facility]



               



               



               

 

 HEMATOLOGY  INR  0.98  0.85 -         [place]



       1.17        [facility]



               



               



               

 

 HEMATOLOGY  PTT  33.2 s  22.9 -         [place]



       35.8        [facility]



               



               



               

 

 BLOOD BANK  Antibody Scrn  Negative          [facility]



 RESULTS              [facility]



     (5/3/18 6:51 PM)          [place]



               



               



               

 

 BLOOD BANK  ABO/Rh  AB POS          [facility]



 RESULTS              [facility]



               



               



               

 

 URINE AND  UA  0.2 EU/dL  0.1 - 1.0        [facility]  Urobilinogen      /      [facility]



               



               



               

 

 URINE AND  UA Nitrite  Negative  Negative        [facility]              [facility]



     (5/3/18 6:35 PM)          [place]



               



               



               

 

 URINE AND  UA Glucose  Negative  Negative        [facility]      [facility]



     (5/3/18 6:35 PM)          [facility]



               



               



               

 

 URINE AND  UA Ketones  Negative  Negative        [facility]              Medical



     *NA*          [place]



               



     (5/3/18 6:35 PM)          



               

 

 URINE AND  UA Bili  Negative  Negative        [facility]              [facility]



     *NA*          [place]



               



     (5/3/18 6:35 PM)          



               

 

 URINE AND  UA Blood  Trace  Negative        [facility]



 STOOL        /2018      Medical



     *ABN*          [place]



               



     (5/3/18 6:35 PM)          



               

 

 URINE AND  UA Leuk Est  Small  Negative        [facility]              [facility]



     *ABN*          [place]



               



     (5/3/18 6:35 PM)          



               

 

 URINE AND  UA Spec Grav  1.020  <=1.030        [facility]



 STOOL        [address]



               



               



               

 

 URINE AND  UA pH  6.0  5.0 - 8.0        [facility]



 STOOL        [address]



               



               



               

 

 URINE AND  UA Color  Yellow  Yellow        [facility]              Medical



     *NA*          [place]



               



     (5/3/18 6:35 PM)          



               

 

 URINE AND  UA Protein  Trace  Negative        [facility]              Medical



     *ABN*          [place]



               



     (5/3/18 6:35 PM)          



               

 

 URINE AND  UA Turbidity  Slight Cloudy  Clear        [facility]      [facility]



     (5/3/18 6:35 PM)          [place]



               



               



               

 

 URINE AND  UA Hyal Cast  0-2  0 - 2        [facility]        [address] [place] [address]



     (5/3/18 6:35 PM)          [facility]



               



               



               

 

 URINE AND  UA Bacteria  Occasional  None Seen        [facility]



 STOOL    /HPF  /HPF  /      [facility]



               



               



               

 

 URINE AND  UA RBC  11-20 /HPF  0 - 2        [address]



               



               



               

 

 URINE AND  UA Sq Epi  Occasional  Few /LPF        [facility]



 STOOL    /LPF    /[address] [place] [address]



               



               



               

 

 URINE AND  UA WBC    None Seen        [facility]



 STOOL    /HPF  /HPF  /      [facility]



               



               



               

 

 HEMATOLOGY  Basophils #  0.1 K/CMM  0.0 - 0.2        [address]



               



               



               

 

 HEMATOLOGY  Polychrom  Slight          [address]



               



               



               

 

 HEMATOLOGY  Plt Morph  Normal          [address]



     (5/3/18 6:20 PM)          [facility]



               



               



               

 

 Brain shunt  Brain shunt  EXAM: SKULL 2 VIEWS        -  [facility] [place]



 series DX  series DX          -  Medical



     EXAM: CHEST 2 VIEWS        This report was dictated by a Radiology Resident
/Fellow.  I have personally reviewed the images as  Center



             well as the Resident's interpretation and agree with the findings.
  



     EXAM: ABDOMEN 2 VIEWS        Read by:  Bernardo Lorenz MD        
  Resident:  Bernardo Lorenz MD  



             Dictated Date/time:  18 20:33  



             Electronically Signed by:  Martin Phillips MD                      
   18 22:11  



             FINAL REPORT  



     DATE: 5/3/2018 7:20 PM CDT          



               



               



               



     INDICATION: Headache. - Please include Codman view for shunt setting.     
     



               



               



               



     COMPARISON: Brain shuntogram 2013          



               



               



               



     TECHNIQUE: AP and lateral views of the skull, chest and abdomen.          



               



               



               



     FINDINGS:          



               



     There is a single intact shunt tube with the proximal aspect in the right 
frontal calvarium coursing across midline to the left anterior chest and 
abdomen with the tip coiled within the pelvis.          



               



               



               



     A right parietal shunt with distal tip in the right lateral abdomen is 
discontinuous. Another shunt present with proximal tip in the right neck base 
and distal tip in the medial mid abdomen          



               



     Cholecystectomy clips are noted. The lungs are clear but underinflated. 
Appearance of the pelvis is unchanged with bilateral shallow acetabular roofs. 
No obvious posterior dislocation. Spinal dysraphism          



      is seen with absence of the spinous process from L3 to S1.          



               



               



               



     IMPRESSION:          



               



               



               



     1. No significant change. The right transfrontal shunt catheter is intact 
along its course with the distal tip in the pelvis.          



               



     2. Discontinuous right parieto-occipital catheter and 2 discontinuous 
catheters in the right chest and abdomen are unchanged.          



               



     3. Spinal dysraphism.          



               



               



               



               

 

 Brain wo  Brain wo  EXAM: CT BRAIN WITHOUT CONTRAST        [facility]



 contrast CT  contrast CT      /    -  Medical



             This report was dictated by a Radiology Resident/Fellow.  I have 
personally reviewed the images as  Center



             well as the Resident's interpretation and agree with the findings.
  



     DATE: 5/3/2018 627 PM CDT        Read by:  Bernardo Lorenz MD    
      Resident:  Bernardo Lorenz MD  



             Dictated Date/time:  18 18:45  



             Electronically Signed by:  Martin Phillips MD                      
   18 21:12  



             FINAL REPORT  



     INDICATION: 32-year-old male patient with history of  shunt malfunction 
         



               



               



               



     COMPARISON: CT of the brain 2015, 2013.          



               



               



               



     TECHNIQUE: Axial CT images of the brain were obtained. Sagittal and 
coronal reformats.          



               



     IV contrast: None.          



               



               



               



     FINDINGS:          



               



     An orphaned right occipital approach  shunt is present. Also present is 
a right frontal approach  shunt with tip in the left lateral ventricle.      
    



               



     Narrowing of the lateral ventricles is present, unchanged from 2015. 
         



               



     There is mild uncal and cerebellar tonsillar herniation, also unchanged.  
        



               



     No recent hemorrhage or territorial infarct. No mass. No midline shift.   
       



               



     No scalp fluid collections.          



               



     Sinuses are clear.          



               



               



               



     IMPRESSION:          



               



     No acute intracranial abnormality.          



               



     Adequately decompressed ventricular system.          



               



               



               



               

 

 Chest 1view  Chest 1view  EXAM: XR CHEST 1 VIEW        [facility]



 DX  DX      /    -  Medical



             This report was dictated by a Radiology Resident/Fellow.  I have 
personally reviewed the images as  Center



             well as the Resident's interpretation and agree with the findings.
  



     DATE: 5/3/2018 6:12 PM CDT        Read by:  Olvin Palacio MD          
       Resident:  Olvin Palacio MD  



             Dictated Date/time:  18 18:31  



             Electronically Signed by:  Bakari Interiano MD              
   18 19:29  



             FINAL REPORT  



     INDICATION:  - picc line use          



               



               



               



     UT SECTION: ER          



               



               



               



     COMPARISON: None.          



               



               



               



     TECHNIQUE: AP chest          



               



               



               



     FINDINGS:          



               



     Lines, tubes and hardware:          



               



     Left PICC tip at mid SVC.          



               



               



               



     Lungs and pleura: No pulmonary or pleural based abnormality is identified. 
Pulmonary vascularity is normal.          



               



               



               



     Heart and mediastinum: The heart size is normal for technique. The 
mediastinal contours are normal.          



               



               



               



     Bones: No acute bony abnormality is identified.          



               



               



               



     Upper abdomen: Normal.          



               



               



               



               



               



     IMPRESSION:          



               



     Left PICC tip at mid SVC.          



               



               



               



     No acute cardiopulmonary abnormality is observed.          



               



               



               



               







Vital Signs







 Vital Sign  Value  Date  Comments  Source



         

 

 Temperature Oral (F)  97.2 F  2018    [facility]



         

 

 Systolic (mm Hg)  127  2018    [facility]



         

 

 Diastolic (mm Hg)  85  2018    [facility]



         

 

 Heart Rate  81  2018    [facility]



         

 

 Respitory Rate  18  2018    [facility]



         

 

 Heart Rate  90  2018    [facility]



         

 

 Systolic (mm Hg)  106  2018    [facility]



         

 

 Diastolic (mm Hg)  71  2018    [facility]



         

 

 Respitory Rate  18  2018    [facility]



         

 

 Temperature Oral (F)  98.1 F  2018    [facility]



         

 

 Respitory Rate  18  2018    [facility]



         

 

 Systolic (mm Hg)  168  2018    [facility]



         

 

 Diastolic (mm Hg)  107  2018    [facility]



         

 

 Heart Rate  87  2018    [facility]



         

 

 Temperature Oral (F)  98.1 F  2018    [facility]



         

 

 Weight  127.027  2018    [facility]



         

 

 Weight  127.027  2018    [facility]



         

 

 Weight  127.027  2018    [facility]



         

 

 BMI Calculated  48.16  2018    [facility]



         

 

 Height  162.56 cm  2018    [facility]



         

 

 Height  149.86 cm  2018    [facility]



         

 

 BMI Calculated  56.67  2018    [facility]



         







Encounters







 Location  Location  Encounter  Encounter  Reason  Attending  ADM  DC  Status  
Source



   Details  Type  Number  For  Provider  Date  Date    



         Visit          



                   



                   



                   

 

 [facility]  [id number]    [place]      [facility]



 Jose Ulloah      [facility] ([place], [place])



                   



                   



                   







Procedures







 Procedure  Code  Date  Perfomer  Comments  Source



           



           

 

 Cholecystectomy  [id number]        [facility]



           

 

 Shunt of cerebral  [id number]        [facility] [place]



 ventricle to          Medical



 extracranial site          Center

## 2018-08-22 NOTE — RAD REPORT
EXAM DESCRIPTION:

RAD - Foot Right 2 View - 8/22/2018 6:38 pm

 

CLINICAL HISTORY:  Right foot pain swelling

 

FINDINGS:

Extensive edema is present within the subcutaneous tissues. 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

 

No destructive bony lesion seen

 

Prominent soft tissue along the dorsal aspect of the forefoot is nonspecific but could indicate an ab
scess and should be correlated clinically. Ultrasound may be helpful

## 2018-08-22 NOTE — ER
Nurse's Notes                                                                                     

 [facility]                                                                

Name: Redd Dale Jr                                                                            

Age: 32 yrs                                                                                       

Sex: Male                                                                                         

: 1985                                                                                   

MRN: [id number]                                                                                   

Arrival Date: 2018                                                                          

Time: 16:57                                                                                       

Account#: [id number]                                                                            

Bed 7                                                                                             

Private MD:                                                                                       

Diagnosis: Sepsis, unspecified organism                                                           

                                                                                                  

Presentation:                                                                                     

                                                                                             

16:57 Presenting complaint: EMS states: hasnt produce that much urine since this AM and       hj  

      stated suprapubic catheter backup about 25 cc of urine; complaints of pain on the           

      bladder area; denies fever and chills ut urine in bag is cloudy; BP- 153/109; LA- 107;      

      O 2 sat 98%; BGL- 60,given a tube of sugar; post tx- 55;. Transition of care: patient       

      was not received from another setting of care. Onset of symptoms was 2018.       

      Risk Assessment: Do you want to hurt yourself or someone else? Patient reports no           

      desire to harm self or others. Initial Sepsis Screen: Does the patient meet any 2           

      criteria? No. Patient's initial sepsis screen is negative. Does the patient have a          

      suspected source of infection? No. Patient's initial sepsis screen is negative. Care        

      prior to arrival: None.                                                                     

16:57 Method Of Arrival: EMS: [facility]                                                      

16:57 Acuity: AYESHA 3                                                                             

                                                                                                  

Triage Assessment:                                                                                

17:03 General: Appears in no apparent distress. uncomfortable, Behavior is calm, cooperative, hj  

      appropriate for age. Pain: Complains of pain in pelvis.                                     

                                                                                                  

Historical:                                                                                       

- Allergies:                                                                                      

17:02 Amoxicillin;                                                                              

17:02 Bactrim;                                                                                  

17:02 Ciprofloxacin;                                                                            

17:02 CLAVULANIC ACID;                                                                          

17:02 Doxycycline;                                                                              

17:02 Levofloxacin;                                                                             

17:02 Morphine;                                                                               hj  

17:02 sulfamethoxazole;                                                                       hj  

17:02 Toradol;                                                                                  

17:02 TRIMETHOPRIM;                                                                             

17:02 Vancomycin;                                                                               

17:02 Zofran;                                                                                 hj  

- Home Meds:                                                                                      

17:02 metoprolol tartrate 25 mg Oral tab 1 tab 2 times per day [Active]; pantoprazole 40 mg   hj  

      Oral TbEC 1 tab once daily [Active]; ProMod Protein Oral liqd 30 mL twice a day             

      [Active]; Vitamin C 500 mg Oral tab twice a day [Active]; zinc sulfate 220 (50) mg Oral     

      cap daily [Active];                                                                         

- PMHx:                                                                                           

17:02 Asthma; Cerebral Palsy; cluster headaches; decubitus ulcers on feet; GERD;              hj  

      Hydrocephalus; Hypertension; spina bifida;                                                  

- PSHx:                                                                                           

17:02 Unable to obtain;                                                                       hj  

                                                                                                  

- Immunization history:: Adult Immunizations up to date.                                          

- Social history:: Smoking status: Patient/guardian denies using tobacco,                         

  Patient/guardian denies using alcohol.                                                          

- Ebola Screening: : Patient negative for fever greater than or equal to 101.5 degrees            

  Fahrenheit, and additional compatible Ebola Virus Disease symptoms Patient denies               

  exposure to infectious person Patient denies travel to an Ebola-affected area in the            

  21 days before illness onset.                                                                   

- Family history:: not pertinent.                                                                 

- Hospitalizations: : No recent hospitalization is reported.                                      

                                                                                                  

                                                                                                  

Screenin:03 Abuse screen: Denies threats or abuse. Denies injuries from another. Nutritional        hj  

      screening: No deficits noted. Tuberculosis screening: No symptoms or risk factors           

      identified. Fall Risk None identified.                                                      

                                                                                                  

Assessment:                                                                                       

17:02 General: Appears in no apparent distress. uncomfortable, obese, Behavior is calm,       hj  

      cooperative, appropriate for age, Smells of urine and wound;. Pain: Complains of pain       

      in buttocks, right foot and left foot and pelvis Pain currently is 8 out of 10 on a         

      pain scale. Neuro: Level of Consciousness is awake, alert, obeys commands, Oriented to      

      person, place, time, situation, Appropriate for age. Cardiovascular: Heart tones S1 S2      

      present Capillary refill < 3 seconds Patient's skin is warm and dry. Respiratory:           

      Airway is patent Respiratory effort is even, unlabored, Respiratory pattern is regular,     

      symmetrical. GI: Abdomen is round non-distended, Bowel sounds present X 4 quads. Abd is     

      soft Abdomen is tender to palpation in right lower quadrant and left lower quadrant         

      Reports lower abdominal pain. : suprapubic catheter in place to gravity drainage per      

      pt, "is leaking and i have urinated good since this morning";. EENT: No signs and/or        

      symptoms were reported regarding the EENT system. Derm: Skin is intact, Wound noted         

      left hamstring and left calf, L and R foot; Decubitus located on sacrum.                    

      Musculoskeletal: Circulation, motion, and sensation intact. Capillary refill < 3            

      seconds.                                                                                    

17:20 Reassessment: Patient and/or family updated on plan of care and expected duration. Pain hj  

      level reassessed. Patient is alert, oriented x 3, equal unlabored respirations, skin        

      warm/dry/pink. complaints of pain; MD notified with orders;.                                

18:00 Reassessment: Patient and/or family updated on plan of care and expected duration. Pain hj  

      level reassessed. Patient is alert, oriented x 3, equal unlabored respirations, skin        

      warm/dry/pink. awaiting results and possible admit;.                                        

19:00 Reassessment: cleaned pt; BM x 1; bilateral foot re wrapped;.                           hj  

19:36 General: Appears uncomfortable, Behavior is calm, cooperative, appropriate for age.     ea  

      Pain: Complains of pain in right lower quadrant and left foot and right foot and            

      buttocks Pain. Neuro: Level of Consciousness is awake, alert, obeys commands, Oriented      

      to person, place, time, situation. Cardiovascular: Heart tones S1 S2 present Patient's      

      skin is warm and dry. Respiratory: Airway is patent Respiratory effort is even,             

      unlabored, Respiratory pattern is regular, symmetrical. GI: Abdomen is round                

      non-distended, Bowel sounds present X 4 quads. Abd is soft X 4 quads Abdomen is tender      

      to palpation in left lower quadrant and right lower quadrant. : suprapubic catheter       

      in place to gravity drainage. EENT: No signs and/or symptoms were reported regarding        

      the EENT system. Derm: Wound noted left hamstring and right foot. Musculoskeletal:          

      Circulation, motion, and sensation intact.                                                  

20:21 Reassessment: Patient and/or family updated on plan of care and expected duration. Pain ea  

      level reassessed. Patient is alert, oriented x 3, equal unlabored respirations, skin        

      warm/dry/pink. Pt complaining of pain, provider notified, no new orders at this time.       

21:43 Reassessment: Patient and/or family updated on plan of care and expected duration. Pain ea  

      level reassessed. Patient is alert, oriented x 3, equal unlabored respirations, skin        

      warm/dry/pink.                                                                              

22:37 Reassessment: Patient and/or family updated on plan of care and expected duration. Pain ea  

      level reassessed. Patient is alert, oriented x 3, equal unlabored respirations, skin        

      warm/dry/pink.                                                                              

23:34 Reassessment: Patient and/or family updated on plan of care and expected duration. Pain ea  

      level reassessed. Patient is alert, oriented x 3, equal unlabored respirations, skin        

      warm/dry/pink. Report called to second floor. Pt taken via stretcher per ED tech.           

                                                                                                  

Vital Signs:                                                                                      

17:03  / 50; Pulse 95; Resp 18; Temp 99.2(O); Pulse Ox 98% on R/A; Weight 131.54 kg;    hj  

      Height 4 ft. 11 in. (149.86 cm); Pain 10/10;                                                

18:00  / 55; Pulse 92; Resp 18; Pulse Ox 100% on R/A;                                   hj  

18:45  / 60; Pulse 90; Resp 18; Pulse Ox 99% on R/A;                                    hj  

19:40  / 76; Pulse 90; Resp 18; Pulse Ox 98% ;                                          ea  

20:21  / 87; Pulse 89; Resp 18; Pulse Ox 98% ;                                          ea  

22:13 BP 97 / 56; Pulse 86; Resp 18 S; Pulse Ox 98% on R/A;                                   vd2 

22:37  / 83; Pulse 80; Resp 18; Temp 98.6(O); Pulse Ox 98% ;                            ea  

23:34  / 78; Pulse 78; Resp 18; Pulse Ox 98% on R/A; Pain 0/10;                         ea  

17:03 Body Mass Index 58.57 (131.54 kg, 149.86 cm)                                            hj  

                                                                                                  

ED Course:                                                                                        

16:57 Patient arrived in ED.                                                                  hj  

16:57 Anuel Berry MD is Attending Physician.                                                rn  

17:01 Triage completed.                                                                       hj  

17:03 Arm band placed on right wrist.                                                         hj  

17:05 Dale Nathan, RN is Primary Nurse.                                                    hj  

17:05 Patient has correct armband on for positive identification. Placed in gown. Bed in low  hj  

      position. Call light in reach. Side rails up X 1.                                           

17:35 Inserted saline lock: 24 gauge in right antecubital area, using aseptic technique.        

      Blood collected.                                                                            

17:35 Initial lab(s) drawn, by me, sent to lab. First set of blood cultures drawn by me,        

      Urine collected: Ortega catheter specimen, cloudy, sediment noted, Amount Returned: 45mL.    

18:38 X-ray completed. Portable x-ray completed in exam room. Patient tolerated procedure     bb2 

      well.                                                                                       

18:39 XRAY Foot LEFT 2 View In Process Unspecified.                                           EDMS

18:39 XRAY Foot RIGHT 2 View In Process Unspecified.                                          EDMS

18:46 X-ray completed. Portable x-ray completed in exam room. Patient tolerated procedure     bb2 

      well.                                                                                       

19:00 Report given to YADEIL Ahn.                                                              hj  

19:11 Attending Physician role handed off by Anuel Berry MD                                 gs  

19:11 Andrey Harper MD is Attending Physician.                                              gs  

19:47 Anabelle Jasso RN is Primary Nurse.                                                    ea  

20:50 CT Abd/Pelvis - W/Contrast In Process Unspecified.                                      EDMS

21:04 CT completed. Patient tolerated procedure well. Patient moved back from CT.             nj  

22:00 Joan Rice MD is Hospitalizing Provider.                                          gs  

22:12 Missed attempt(s): 22 gauge in right hand. Bleeding controlled, band aid applied,       vd2 

      catheter tip intact.                                                                        

22:35 No provider procedures requiring assistance completed.                                  ea  

22:35 Missed attempt(s): 18 gauge in left upper arm. midline. Bleeding controlled, band aid   fc  

      applied, catheter tip intact.                                                               

22:36 Patient admitted, IV remains in place.                                                  ea  

22:50 Inserted saline lock: 20 gauge in right forearm, using aseptic technique.               fc  

                                                                                                  

Administered Medications:                                                                         

17:50 Drug: Demerol 50 mg Route: IVP; Site: right antecubital;                                hj  

18:30 Follow up: Response: Pain is decreased                                                  hj  

18:43 Drug: Zosyn 3.375 grams Route: IVPB; Infused Over: 60 mins; Site: right antecubital;    hj  

19:32 Follow up: IV Status: Completed infusion                                                hj  

19:47 Drug: Tylenol 650 mg Route: PO;                                                         ea  

20:20 Follow up: Response: No adverse reaction; Marked relief of symptoms                     ea  

19:48 Drug: NS 0.9% 1000 ml Route: IV; Rate: 125 ml/hr; Site: left antecubital;               ea  

22:38 Follow up: IV Status: Infusion continued upon admission                                 ea  

22:54 Drug: fentaNYL (PF) 50 mcg Route: IVP; Site: right forearm;                             ea  

23:28 Follow up: Response: No adverse reaction; Pain is decreased                             fc  

                                                                                                  

                                                                                                  

Point of Care Testing:                                                                            

      Blood Glucose:                                                                              

17:03 Blood Glucose: 55 mg/dL;                                                                hj  

19:40 Blood Glucose: 77 mg/dL;                                                                ea  

      Ranges:                                                                                     

                                                                                                  

Outcome:                                                                                          

22:01 Decision to Hospitalize by Provider.                                                    gs  

22:35 Condition: stable                                                                       ea  

22:35 Instructed on the need for admit.                                                           

23:27 Admitted to Med/surg accompanied by tech, via stretcher, room 219, with chart, Report   fc  

      called to  Cristal COTTO                                                                         

23:35 Patient left the ED.                                                                    ea  

                                                                                                  

Signatures:                                                                                       

Dispatcher [employer]                           EDMS                                                 

Funmi Glass RN RN                                                      

Anuel Berry MD MD rn [place], [facility]                                                  

Dale Nathan RN RN hj Jordan, Nathan nj Antunez, Elena, RN RN ea Starr, Gregory, MD MD   [facility], [place]iki                               2                                                  

                                                                                                  

Corrections: (The following items were deleted from the chart)                                    

22:48 20:21 Reassessment: Patient and/or family updated on plan of care and expected          ea  

      duration. Pain level reassessed. Patient is alert, oriented x 3, equal unlabored            

      respirations, skin warm/dry/pink. ea                                                        

22:51 22:50 Inserted saline lock: 22 gauge in right forearm, using aseptic technique. fc      fc  

                                                                                                  

**************************************************************************************************

## 2018-08-23 LAB
BUN BLD-MCNC: 10 MG/DL (ref 7–18)
GLUCOSE SERPLBLD-MCNC: 114 MG/DL (ref 74–106)
HCT VFR BLD CALC: 40.4 % (ref 39.6–49)
LYMPHOCYTES # SPEC AUTO: 1.1 K/UL (ref 0.7–4.9)
MCH RBC QN AUTO: 26.3 PG (ref 27–35)
MCV RBC: 79.8 FL (ref 80–100)
PMV BLD: 7.8 FL (ref 7.6–11.3)
POTASSIUM SERPL-SCNC: 3.5 MMOL/L (ref 3.5–5.1)
RBC # BLD: 5.07 M/UL (ref 4.33–5.43)

## 2018-08-23 RX ADMIN — Medication SCH: at 21:00

## 2018-08-23 RX ADMIN — AZTREONAM SCH MLS: 1 INJECTION, POWDER, LYOPHILIZED, FOR SOLUTION INTRAMUSCULAR; INTRAVENOUS at 09:38

## 2018-08-23 RX ADMIN — FENTANYL CITRATE PRN MCG: 50 INJECTION, SOLUTION INTRAMUSCULAR; INTRAVENOUS at 11:24

## 2018-08-23 RX ADMIN — MEPERIDINE HYDROCHLORIDE PRN MG: 25 INJECTION, SOLUTION INTRAMUSCULAR; INTRAVENOUS; SUBCUTANEOUS at 21:31

## 2018-08-23 RX ADMIN — PROMETHAZINE HYDROCHLORIDE PRN MG: 25 INJECTION INTRAMUSCULAR; INTRAVENOUS at 17:38

## 2018-08-23 RX ADMIN — SODIUM CHLORIDE SCH MLS: 0.9 INJECTION, SOLUTION INTRAVENOUS at 21:31

## 2018-08-23 RX ADMIN — Medication SCH ML: at 08:00

## 2018-08-23 RX ADMIN — SODIUM CHLORIDE SCH MLS: 0.9 INJECTION, SOLUTION INTRAVENOUS at 09:39

## 2018-08-23 RX ADMIN — SODIUM CHLORIDE SCH MLS: 0.9 INJECTION, SOLUTION INTRAVENOUS at 00:52

## 2018-08-23 RX ADMIN — AZTREONAM SCH MLS: 1 INJECTION, POWDER, LYOPHILIZED, FOR SOLUTION INTRAMUSCULAR; INTRAVENOUS at 17:38

## 2018-08-23 RX ADMIN — SODIUM CHLORIDE SCH MLS: 0.9 INJECTION, SOLUTION INTRAVENOUS at 07:51

## 2018-08-23 RX ADMIN — PROMETHAZINE HYDROCHLORIDE PRN MG: 25 INJECTION INTRAMUSCULAR; INTRAVENOUS at 11:24

## 2018-08-23 RX ADMIN — FENTANYL CITRATE PRN MCG: 50 INJECTION, SOLUTION INTRAMUSCULAR; INTRAVENOUS at 07:51

## 2018-08-23 RX ADMIN — FENTANYL CITRATE PRN MCG: 50 INJECTION, SOLUTION INTRAMUSCULAR; INTRAVENOUS at 03:03

## 2018-08-23 RX ADMIN — ENOXAPARIN SODIUM SCH MG: 40 INJECTION SUBCUTANEOUS at 08:00

## 2018-08-23 RX ADMIN — FENTANYL CITRATE PRN MCG: 50 INJECTION, SOLUTION INTRAMUSCULAR; INTRAVENOUS at 15:44

## 2018-08-23 RX ADMIN — Medication SCH PKT: at 21:00

## 2018-08-23 NOTE — PN
Date of Progress Note:  08/23/2018



Subjective:  The patient is seen and examined.  Chart reviewed and case 
discussed with RN.  The patient says that he is having some pain in his wounds, 
specifically on his back as well.  Does not report any problems with his 
suprapubic catheter.



Review of Systems:

Negative except as above.



Medications:  List reviewed.



Physical Examination:

Vital Signs:  Temperature 97.9, heart rate 90, blood pressure 112/65, 
respirations 18, O2 sat 95% on 1 L via nasal cannula. 

General:  Awake, alert, oriented x3, in some mild distress.  Obese male.  BMI 
57. 

CV:  S1, S2.  No murmurs.  Peripheral pulses present. 

Respiratory:  Moving air well bilaterally.  No wheezing. 

Gastrointestinal:  Abdomen is soft, nontender, nondistended.  Positive bowel 
sounds. 

Extremities:  No clubbing, cyanosis.  The patient does have some lower 
extremity edema. 

Neuro:  paraplegic

Skin:  Multiple wounds on his heels bilaterally. 

:  The patient does have a suprapubic catheter.



Laboratory Data:  Sodium 139, potassium 3.5, chloride 106, CO2 of 26, BUN 10, 
creatinine 0.8, glucose 114, calcium 8.5.  Procalcitonin less than 0.05.  WBC 8
, H and H of 13.3 and 40.4, platelets 519, neutrophils 74.1%.



Assessment And Plan:  A 32-year-old male with:

1.   Intractable back pain.  We will continue with IV pain medications.

2.   Leukocytosis with neutrophilia.

3.   Chronic indwelling Ortega catheter.

4.   Osteomyelitis, left foot and ankle multifocal.  Await surgical evaluation.

5.   Paraplegic, spinal paralysis.

6.   Spina bifida, lumbosacral without hydrocephalus status post shunt.

7.   Stage IV pressure ulcer of heel.

8.   UTI secondary to GNRs. Indwelling supra pubic catheter 



Plan:  We will continue with IV antibiotics.  Follow up on cultures.  Urine 
culture growing 4+ gram-negative rods.  Does have chronic indwelling suprapubic 
catheter.  Continue antibiotics and watch for signs of sepsis.





/MAGEN

DD:  08/23/2018 14:32:19   Voice ID:  [id number]

DT:  08/23/2018 16:56:14   Report ID:  [id number]

PATY

## 2018-08-24 RX ADMIN — MEPERIDINE HYDROCHLORIDE PRN MG: 25 INJECTION, SOLUTION INTRAMUSCULAR; INTRAVENOUS; SUBCUTANEOUS at 12:36

## 2018-08-24 RX ADMIN — MEPERIDINE HYDROCHLORIDE PRN MG: 25 INJECTION, SOLUTION INTRAMUSCULAR; INTRAVENOUS; SUBCUTANEOUS at 16:43

## 2018-08-24 RX ADMIN — PROMETHAZINE HYDROCHLORIDE PRN MG: 25 INJECTION INTRAMUSCULAR; INTRAVENOUS at 12:37

## 2018-08-24 RX ADMIN — AZTREONAM SCH MLS: 1 INJECTION, POWDER, LYOPHILIZED, FOR SOLUTION INTRAMUSCULAR; INTRAVENOUS at 09:01

## 2018-08-24 RX ADMIN — MEPERIDINE HYDROCHLORIDE PRN MG: 25 INJECTION, SOLUTION INTRAMUSCULAR; INTRAVENOUS; SUBCUTANEOUS at 04:43

## 2018-08-24 RX ADMIN — MEPERIDINE HYDROCHLORIDE PRN MG: 25 INJECTION, SOLUTION INTRAMUSCULAR; INTRAVENOUS; SUBCUTANEOUS at 09:00

## 2018-08-24 RX ADMIN — PROMETHAZINE HYDROCHLORIDE PRN MG: 25 INJECTION INTRAMUSCULAR; INTRAVENOUS at 18:37

## 2018-08-24 RX ADMIN — Medication SCH PKT: at 09:01

## 2018-08-24 RX ADMIN — SODIUM CHLORIDE SCH MLS: 0.9 INJECTION, SOLUTION INTRAVENOUS at 11:38

## 2018-08-24 RX ADMIN — SODIUM CHLORIDE SCH: 0.9 INJECTION, SOLUTION INTRAVENOUS at 15:35

## 2018-08-24 RX ADMIN — AZTREONAM SCH MLS: 1 INJECTION, POWDER, LYOPHILIZED, FOR SOLUTION INTRAMUSCULAR; INTRAVENOUS at 16:39

## 2018-08-24 RX ADMIN — Medication SCH ML: at 20:59

## 2018-08-24 RX ADMIN — Medication SCH: at 09:00

## 2018-08-24 RX ADMIN — SODIUM CHLORIDE SCH: 0.9 INJECTION, SOLUTION INTRAVENOUS at 05:38

## 2018-08-24 RX ADMIN — PROMETHAZINE HYDROCHLORIDE PRN MG: 25 INJECTION INTRAMUSCULAR; INTRAVENOUS at 06:29

## 2018-08-24 RX ADMIN — Medication SCH: at 20:59

## 2018-08-24 RX ADMIN — AZTREONAM SCH MLS: 1 INJECTION, POWDER, LYOPHILIZED, FOR SOLUTION INTRAMUSCULAR; INTRAVENOUS at 00:42

## 2018-08-24 RX ADMIN — MEPERIDINE HYDROCHLORIDE PRN MG: 25 INJECTION, SOLUTION INTRAMUSCULAR; INTRAVENOUS; SUBCUTANEOUS at 00:32

## 2018-08-24 RX ADMIN — MEPERIDINE HYDROCHLORIDE PRN MG: 25 INJECTION, SOLUTION INTRAMUSCULAR; INTRAVENOUS; SUBCUTANEOUS at 20:42

## 2018-08-24 RX ADMIN — ENOXAPARIN SODIUM SCH MG: 40 INJECTION SUBCUTANEOUS at 09:00

## 2018-08-24 RX ADMIN — PROMETHAZINE HYDROCHLORIDE PRN MG: 25 INJECTION INTRAMUSCULAR; INTRAVENOUS at 00:32

## 2018-08-24 RX ADMIN — SODIUM CHLORIDE SCH MLS: 0.9 INJECTION, SOLUTION INTRAVENOUS at 22:02

## 2018-08-24 NOTE — PN
Date of Progress Note:  08/24/2018



Subjective:  The patient is seen and examined.  Chart reviewed and case 
discussed with RN.  The patient states he is feeling some generalized malaise.  
Otherwise, pain is controlled with Demerol.



Review of Systems:

Negative except as above.



Medications:  List reviewed.



Physical Examination:

Vital Signs:  Temperature 98.2, heart rate 74, blood pressure 120/74, 
respirations 18, O2 of 96% on gas on room . 

General:  Awake, alert, oriented x3.  Some mild distress, ill-appearing male, 
morbidly obese.  BMI 57. 

CV:  S1, S2.  No murmurs. 

Respiratory:  Moving air well bilaterally.  No wheezing. 

Gastrointestinal:  Abdomen is soft.  Mild tenderness to palpation in the 
epigastric region.  No guarding or rigidity.  Positive bowel sounds.  
Suprapubic catheter in place. 

Extremities:  No clubbing, cyanosis.  The patient does have some lower 
extremity edema. 

Neuro:  Paraplegic. 

Skin:  The patient has multiple wounds including bilateral heels bandaged.



Laboratory Data:  Labs are pending at this time.  Urine culture growing gram-
negative rods.  ID and sensitivity pending.  Wound cultures and blood cultures 
pending.  Blood cultures, no growth to date.



Assessment And Plan:  A 32-year-old male with:

1.   Intractable back pain.  Continue with IV analgesics.

2.   Urinary tract infection, acute cystitis without hematuria secondary to gram
-negative rods.  The patient does have an indwelling suprapubic catheter.  We 
will continue with IV antibiotics and follow up on culture and sensitivity.

3.   Chronic indwelling Ortega catheter.

4.   possible Osteomyelitis possibly left foot and ankle, multifocal.  Dr. Estrada orozco has been consulted.  We will await surgical evaluation.  Continue 
debridement for now.

5.   Paraplegic spinal paralysis.

6.   Spina bifida lumbosacral without hydrocephalus status post shunt.

7.   Stage IV pressure ulcer of the heel on the right.





SA/MODL

DD:  08/24/2018 10:39:06   Voice ID:  [id number]

DT:  08/24/2018 14:21:05   Report ID:  [id number]

MTDD

## 2018-08-25 LAB
ALBUMIN SERPL BCP-MCNC: 2.1 G/DL (ref 3.4–5)
ALP SERPL-CCNC: 89 U/L (ref 45–117)
ALT SERPL W P-5'-P-CCNC: 19 U/L (ref 12–78)
AST SERPL W P-5'-P-CCNC: 17 U/L (ref 15–37)
BUN BLD-MCNC: 11 MG/DL (ref 7–18)
GLUCOSE SERPLBLD-MCNC: 90 MG/DL (ref 74–106)
HCT VFR BLD CALC: 42.8 % (ref 39.6–49)
LYMPHOCYTES # SPEC AUTO: 2 K/UL (ref 0.7–4.9)
MCH RBC QN AUTO: 26.7 PG (ref 27–35)
MCV RBC: 81.2 FL (ref 80–100)
PMV BLD: 8 FL (ref 7.6–11.3)
POTASSIUM SERPL-SCNC: 4.5 MMOL/L (ref 3.5–5.1)
RBC # BLD: 5.27 M/UL (ref 4.33–5.43)

## 2018-08-25 PROCEDURE — 0HBMXZZ EXCISION OF RIGHT FOOT SKIN, EXTERNAL APPROACH: ICD-10-PCS

## 2018-08-25 PROCEDURE — 0HBNXZZ EXCISION OF LEFT FOOT SKIN, EXTERNAL APPROACH: ICD-10-PCS

## 2018-08-25 RX ADMIN — ENOXAPARIN SODIUM SCH MG: 40 INJECTION SUBCUTANEOUS at 08:54

## 2018-08-25 RX ADMIN — Medication SCH ML: at 00:49

## 2018-08-25 RX ADMIN — AZTREONAM SCH: 1 INJECTION, POWDER, LYOPHILIZED, FOR SOLUTION INTRAMUSCULAR; INTRAVENOUS at 08:55

## 2018-08-25 RX ADMIN — MEPERIDINE HYDROCHLORIDE PRN MG: 25 INJECTION, SOLUTION INTRAMUSCULAR; INTRAVENOUS; SUBCUTANEOUS at 17:50

## 2018-08-25 RX ADMIN — MEPERIDINE HYDROCHLORIDE PRN MG: 25 INJECTION, SOLUTION INTRAMUSCULAR; INTRAVENOUS; SUBCUTANEOUS at 01:43

## 2018-08-25 RX ADMIN — MEPERIDINE HYDROCHLORIDE PRN MG: 25 INJECTION, SOLUTION INTRAMUSCULAR; INTRAVENOUS; SUBCUTANEOUS at 13:49

## 2018-08-25 RX ADMIN — SODIUM CHLORIDE SCH MLS: 0.9 INJECTION, SOLUTION INTRAVENOUS at 20:08

## 2018-08-25 RX ADMIN — PROMETHAZINE HYDROCHLORIDE PRN MG: 25 INJECTION INTRAMUSCULAR; INTRAVENOUS at 17:51

## 2018-08-25 RX ADMIN — PROMETHAZINE HYDROCHLORIDE PRN MG: 25 INJECTION INTRAMUSCULAR; INTRAVENOUS at 00:44

## 2018-08-25 RX ADMIN — MEPERIDINE HYDROCHLORIDE PRN MG: 25 INJECTION, SOLUTION INTRAMUSCULAR; INTRAVENOUS; SUBCUTANEOUS at 21:46

## 2018-08-25 RX ADMIN — Medication SCH: at 21:00

## 2018-08-25 RX ADMIN — Medication SCH: at 08:54

## 2018-08-25 RX ADMIN — MEPERIDINE HYDROCHLORIDE PRN MG: 25 INJECTION, SOLUTION INTRAMUSCULAR; INTRAVENOUS; SUBCUTANEOUS at 05:25

## 2018-08-25 RX ADMIN — AZTREONAM SCH MLS: 1 INJECTION, POWDER, LYOPHILIZED, FOR SOLUTION INTRAMUSCULAR; INTRAVENOUS at 00:57

## 2018-08-25 RX ADMIN — PROMETHAZINE HYDROCHLORIDE PRN MG: 25 INJECTION INTRAMUSCULAR; INTRAVENOUS at 09:57

## 2018-08-25 RX ADMIN — MEPERIDINE HYDROCHLORIDE PRN MG: 25 INJECTION, SOLUTION INTRAMUSCULAR; INTRAVENOUS; SUBCUTANEOUS at 09:58

## 2018-08-25 RX ADMIN — SODIUM CHLORIDE SCH MLS: 9 INJECTION, SOLUTION INTRAVENOUS at 16:25

## 2018-08-25 RX ADMIN — Medication SCH: at 08:55

## 2018-08-25 RX ADMIN — PROMETHAZINE HYDROCHLORIDE PRN MG: 25 INJECTION INTRAMUSCULAR; INTRAVENOUS at 23:34

## 2018-08-25 RX ADMIN — SODIUM CHLORIDE SCH MLS: 9 INJECTION, SOLUTION INTRAVENOUS at 12:12

## 2018-08-25 RX ADMIN — SODIUM CHLORIDE SCH MLS: 0.9 INJECTION, SOLUTION INTRAVENOUS at 07:14

## 2018-08-25 NOTE — CON
Please note, this is a consultation and a procedure note.



Reason For Consultation:  Infected wounds.



Reason For Admission:  Intractable back pain.



History Of Present Illness:  The patient is a 32-year-old gentleman with history of morbid obesity, s
pina bifida, bed-bound with chronic lower extremity ulcers and osteomyelitis in the past.  I have bee
n seeing the patient [facility].  He came to the emergency room because of the back 
pain.  He was evaluated and he had a new wound on his right anterior ankle, and the wound on his left
 heel that I have been following, had got worsened.  Cultures were done and I was consulted.  He is a
wake, alert.  Denies any fever or chills.  No purulent discharge.  He states that the wound has been 
being changed by his mother at home and has been inconsistent in the amount of care his wounds have b
een getting better.  No sore throat, runny nose, cough, headaches, or dizziness.  No chest pain.  No 
fever or chills.



Review of Systems:

Otherwise, unremarkable.



Past Medical History:  Significant for spina bifida, recurrent, UTI, hydrocephalus, migraines.



Past Surgical History:  Foot surgery, hip surgery, appendectomy, peritoneal shunt, suprapubic cathete
r.



Allergies:  MULTIPLE REVIEWED, INCLUDE; LEVAQUIN, MORPHINE, BACTRIM, VANCOMYCIN, AMOXICILLIN, CIPRO, 
DOXYCYCLINE.



Social History:  The patient does drink alcohol.  Does smoke and he has been counseled many times.



Family History:  Significant for hypertension.



Physical Examination:

Vital Signs:  Stable.  He is currently afebrile. 

General:  He is awake, alert, and oriented x3. 

Head and Neck:  No masses. 

Chest:  Clear. 

Heart:  S1, S2. 

Abdomen:  Soft. 

Extremity:  He has flaccid paralysis of lower extremity and on the right anterior ankle there is appr
oximately a 10 x 8 cm open wound stage III with necrotic tissue that needs debridement.  No surroundi
ng erythema, warmth or edema, but the wound itself looks infected.  On the left heel, the similar siz
e 10 x 8 cm with large necrotic eschar in the middle, which needs debridement; and in the left poster
ior thigh, there are 2 wounds, 1 is approximately 4 cm in diameter, the other 1 is 8 cm in diameter, 
but these do not have any necrotic tissue that needs debridement, and there is no surrounding erythem
a, warmth or edema.



Laboratory Data:  On admission, his white count was 11.8 with a left shift.  Chemistry reviewed.  The
 patient does have urine infection and Morganella morganii bacteria and in the wound cultures, it is 
growing E. coli and Morganella morganii, and all of those are sensitive to the meropenem, and they ar
e resistant to most other antibiotics.



Assessment:  Urosepsis, infected wounds, bilateral lower extremity.



Recommendations:  I would recommend at least 2 weeks of IV antibiotics, PICC line, and I think the nathalie guillen needs to be discharged to the skilled nursing facility as he is unable to care for himself adeq
uately at home.  As far as the wound treatment, cleaned with acetic acid, collagenase dressing.



Procedure Note:  Under clean conditions, scissors utilized to clean approximately 6 x 4 cm necrotic e
schar on the right anterior ankle.  Bleeding controlled with pressure.  And, on the left heel, approx
imately 6 x 4 cm necrotic eschar excised with scissors.  And again, bleeding controlled with pressure
.  The patient tolerated the procedure in stable condition.  I can follow the patient in the 

nursing home or [facility] depending upon what the disposition is.





AS/MODL

DD:  08/25/2018 11:14:19Voice ID:  [id number]

DT:  08/25/2018 15:09:44Report ID:  [id number]

## 2018-08-25 NOTE — PN
Subjective:  Currently the patient lying in bed.  He looks comfortable.  No chest pain.  No abdominal
 pain.  No fever.  No chills.  Overnight he had debridement earlier by Dr. Dorado and let us know, I w
ould like him to go to the nursing home.  The patient is current disappointed.  He was hoping to go h
ome with home health.



Objective:  Vital Signs:  Blood pressure is 145/70, respiratory rate 18, pulse 77, temperature is 97.
8. 

General:  The patient is alert oriented x3.  Does not look in any distress.  

HEENT:  Atraumatic, normocephalic.  PERRLA oral mucosa is moist. 

Neck:  Supple.  No JVD.  No carotid bruits. 

Chest:  Clear to auscultation.  Good air entry. 

Heart:  Regular rate and rhythm.  S1, S2 normal.  No gallop or murmur. 

Abdomen:  Soft.  No masses.  No hepatosplenomegaly.  Positive bowel sounds.  Suprapubic catheter note
d. 

Extremity:  No clubbing, no cyanosis.  There is extensive lower extremity edema with multiple wounds 
on both heels with dressing. 

Neurologic:  The patient is paraplegic, otherwise deferred.



Laboratory Data:  Today showed CBC within normal.  Except for platelets at 493.  CMP within normal.  
Calcium 7.9.  Urine was dirty on the 22nd.  Would culture from the wound, the patient grew E. coli an
d Morganella morganii, which are both sensitive to meropenem.  Urine culture also grew Morganella mor
ganii.  Blood in the wound.



Assessment And Plan:  

1.Questionable osteomyelitis of the left foot and ankle, __________.  Culture grew Escherichia coli 
and Morganella, was sensitive to meropenem.  I will switch IV antibiotic to meropenem Dr. Dorado would
 like at least 2 weeks of IV antibiotics.  The patient will need PICC line placement.

2.Paraplegic spinal paralysis with chronic indwelling Ortega catheter continue.

3.Chronic back pain, improving on pain medication.

4.Urinary tract infection with Morganella, which is sensitive to meropenem as well.  Continue IV ant
ibiotic.

5.Spina bifida, without hydrocephalus, status post shunt.

6.Stage IV pressure ulcer of the heel.  Continue follow up with Wound Care as outpatient.

7.Deep vein thrombosis prophylaxis, Lovenox.

8.Social work consult for placement after the weekend prior to IV antibiotic, at the nursing home fo
r 2 weeks at least.

9.Wound care with collagenase.





MT/MODL

DD:  08/25/2018 12:15:32Voice ID:  [id number]

DT:  08/25/2018 14:59:21Report ID:  [id number]

## 2018-08-26 PROCEDURE — 02HV33Z INSERTION OF INFUSION DEVICE INTO SUPERIOR VENA CAVA, PERCUTANEOUS APPROACH: ICD-10-PCS

## 2018-08-26 RX ADMIN — SODIUM CHLORIDE SCH MLS: 9 INJECTION, SOLUTION INTRAVENOUS at 02:19

## 2018-08-26 RX ADMIN — Medication SCH: at 21:00

## 2018-08-26 RX ADMIN — PROMETHAZINE HYDROCHLORIDE PRN MG: 25 INJECTION INTRAMUSCULAR; INTRAVENOUS at 23:32

## 2018-08-26 RX ADMIN — Medication SCH: at 09:00

## 2018-08-26 RX ADMIN — MEPERIDINE HYDROCHLORIDE PRN MG: 50 INJECTION, SOLUTION INTRAMUSCULAR; INTRAVENOUS; SUBCUTANEOUS at 21:53

## 2018-08-26 RX ADMIN — COLLAGENASE SANTYL SCH APPL: 250 OINTMENT TOPICAL at 09:49

## 2018-08-26 RX ADMIN — MEPERIDINE HYDROCHLORIDE PRN MG: 50 INJECTION, SOLUTION INTRAMUSCULAR; INTRAVENOUS; SUBCUTANEOUS at 14:03

## 2018-08-26 RX ADMIN — SODIUM CHLORIDE SCH MLS: 9 INJECTION, SOLUTION INTRAVENOUS at 09:48

## 2018-08-26 RX ADMIN — MEPERIDINE HYDROCHLORIDE PRN MG: 50 INJECTION, SOLUTION INTRAMUSCULAR; INTRAVENOUS; SUBCUTANEOUS at 18:01

## 2018-08-26 RX ADMIN — MEPERIDINE HYDROCHLORIDE PRN MG: 50 INJECTION, SOLUTION INTRAMUSCULAR; INTRAVENOUS; SUBCUTANEOUS at 09:58

## 2018-08-26 RX ADMIN — PROMETHAZINE HYDROCHLORIDE PRN MG: 25 INJECTION INTRAMUSCULAR; INTRAVENOUS at 11:57

## 2018-08-26 RX ADMIN — PROMETHAZINE HYDROCHLORIDE PRN MG: 25 INJECTION INTRAMUSCULAR; INTRAVENOUS at 18:02

## 2018-08-26 RX ADMIN — PROMETHAZINE HYDROCHLORIDE PRN MG: 25 INJECTION INTRAMUSCULAR; INTRAVENOUS at 05:35

## 2018-08-26 RX ADMIN — MEPERIDINE HYDROCHLORIDE PRN MG: 25 INJECTION, SOLUTION INTRAMUSCULAR; INTRAVENOUS; SUBCUTANEOUS at 05:41

## 2018-08-26 RX ADMIN — SODIUM CHLORIDE SCH MLS: 0.9 INJECTION, SOLUTION INTRAVENOUS at 05:34

## 2018-08-26 RX ADMIN — MEPERIDINE HYDROCHLORIDE PRN MG: 25 INJECTION, SOLUTION INTRAMUSCULAR; INTRAVENOUS; SUBCUTANEOUS at 02:09

## 2018-08-26 RX ADMIN — ENOXAPARIN SODIUM SCH MG: 40 INJECTION SUBCUTANEOUS at 09:47

## 2018-08-26 RX ADMIN — SODIUM CHLORIDE SCH MLS: 9 INJECTION, SOLUTION INTRAVENOUS at 18:01

## 2018-08-26 RX ADMIN — SODIUM CHLORIDE SCH MLS: 0.9 INJECTION, SOLUTION INTRAVENOUS at 16:18

## 2018-08-26 NOTE — RAD REPORT
EXAM DESCRIPTION:  RAD - Chest Single View - 8/26/2018 2:23 am

 

CLINICAL HISTORY:  PICC placement

 

COMPARISON:  Chest Single View dated 7/13/2018; Chest Single View dated 5/15/2018; Chest Single View 
dated 3/24/2018; Chest Single View dated 3/22/2018

 

FINDINGS:  Portable chest was obtained following placement of a left upper extremity PICC line. The c
atheter tip projects over the SVC..

## 2018-08-26 NOTE — PN
Subjective:  Currently, the patient is lying in bed.  He looks comfortable, but he is complaining of 
pain in his feet.  He have no chest pain or shortness of breath.  He continued to ask for __________ 
for his nausea, but is able to eat full meals.  No chest pain or shortness of breath.



Review of Systems:

Otherwise, negative.



Objective:  Vital Signs:  Blood pressure is 103/58, respiratory rate 18, pulse 91, temperature 97. 

General:  The patient is alert, oriented x3.  Does not look in any distress. 

HEENT:  Atraumatic, normocephalic.  PERRLA.  Oral mucosa is moist. 

Neck:  Supple.  No JVD.  No carotid bruits. 

Chest:  Clear to auscultation.  Good air entry. 

Heart:  Regular rate and rhythm.  S1, S2 normal.  No gallop. 

Abdomen:  Soft, obese, positive bowel sounds.  No hepatosplenomegaly.  Positive suprapubic catheter n
oted. 

Extremities:  No clubbing or cyanosis.  Extensive lower extremity, anasarca versus edema with multipl
e wounds covered with dressing on both heels. 

Neurologic Exam:  The patient is paraplegic.



Laboratory Data:  Today, CBC within normal except for platelet of 493.  Chemistry within normal.  Wou
nd culture from right foot, 1 showed Proteus mirabilis, which is sensitive to meropenem.  There is of
 wound culture on the left thigh and left heel, showed E. coli and Morganella morganii, as well as ur
ine culture showed Morganella morganii.



Assessment And Plan:  

1.Questionable osteomyelitis of the left foot and ankle, status post debridement by Dr. Dorado.  Cult
ure of the left leg wound grew Escherichia coli and Morganella and culture of the right foot grew Pro
teus, all of them sensitive to meropenem.  Dr. Dorado would like the patient to continue IV antibiotic
 with meropenem for 2 weeks.  The patient needs PICC line placement and discharge to long-term care f
acility.  Social work consult pending, Monday to make arrangements.

2.Paraplegia with chronic indwelling Ortega catheter.  Urine culture was positive for Morganella.  Th
e patient on IV antibiotic with meropenem.

3.Chronic back pain.  The patient on pain medication with Demerol every 4 hours.

4.Urinary tract infection, continue meropenem.

5.Spina bifida without hydrocephalus status post with shunt.

6.Stage IV pressure ulcer on the heel.  Continue the wound care.  The patient is status post debride
ment.

7.Deep vein thrombosis prophylaxis with Lovenox.

8.Wound care by Wound Care team.

9.Discharge plan after social work find long-term care facility for IV antibiotic for the patient.  
So the patient will probably be discharged Monday or Tuesday that depend on discharge approval for th
e placement.





BLADIMIR

DD:  08/26/2018 10:27:49Voice ID:  [id number]

DT:  08/26/2018 14:19:07Report ID:  [id number]

## 2018-08-27 RX ADMIN — SODIUM CHLORIDE SCH MLS: 0.9 INJECTION, SOLUTION INTRAVENOUS at 01:47

## 2018-08-27 RX ADMIN — SODIUM CHLORIDE SCH MLS: 9 INJECTION, SOLUTION INTRAVENOUS at 17:45

## 2018-08-27 RX ADMIN — Medication SCH PKT: at 09:49

## 2018-08-27 RX ADMIN — SODIUM CHLORIDE SCH MLS: 0.9 INJECTION, SOLUTION INTRAVENOUS at 22:04

## 2018-08-27 RX ADMIN — MEPERIDINE HYDROCHLORIDE PRN MG: 50 INJECTION, SOLUTION INTRAMUSCULAR; INTRAVENOUS; SUBCUTANEOUS at 22:00

## 2018-08-27 RX ADMIN — SODIUM CHLORIDE SCH MLS: 9 INJECTION, SOLUTION INTRAVENOUS at 09:49

## 2018-08-27 RX ADMIN — PROMETHAZINE HYDROCHLORIDE PRN MG: 25 INJECTION INTRAMUSCULAR; INTRAVENOUS at 17:46

## 2018-08-27 RX ADMIN — MEPERIDINE HYDROCHLORIDE PRN MG: 50 INJECTION, SOLUTION INTRAMUSCULAR; INTRAVENOUS; SUBCUTANEOUS at 01:42

## 2018-08-27 RX ADMIN — Medication SCH PKT: at 22:49

## 2018-08-27 RX ADMIN — MEPERIDINE HYDROCHLORIDE PRN MG: 50 INJECTION, SOLUTION INTRAMUSCULAR; INTRAVENOUS; SUBCUTANEOUS at 09:48

## 2018-08-27 RX ADMIN — Medication SCH ML: at 22:49

## 2018-08-27 RX ADMIN — MEPERIDINE HYDROCHLORIDE PRN MG: 50 INJECTION, SOLUTION INTRAMUSCULAR; INTRAVENOUS; SUBCUTANEOUS at 05:52

## 2018-08-27 RX ADMIN — PROMETHAZINE HYDROCHLORIDE PRN MG: 25 INJECTION INTRAMUSCULAR; INTRAVENOUS at 11:49

## 2018-08-27 RX ADMIN — COLLAGENASE SANTYL SCH APPL: 250 OINTMENT TOPICAL at 09:52

## 2018-08-27 RX ADMIN — ENOXAPARIN SODIUM SCH MG: 40 INJECTION SUBCUTANEOUS at 09:49

## 2018-08-27 RX ADMIN — Medication SCH ML: at 09:50

## 2018-08-27 RX ADMIN — SODIUM CHLORIDE SCH MLS: 0.9 INJECTION, SOLUTION INTRAVENOUS at 14:28

## 2018-08-27 RX ADMIN — MEPERIDINE HYDROCHLORIDE PRN MG: 50 INJECTION, SOLUTION INTRAMUSCULAR; INTRAVENOUS; SUBCUTANEOUS at 16:06

## 2018-08-27 RX ADMIN — SODIUM CHLORIDE SCH MLS: 9 INJECTION, SOLUTION INTRAVENOUS at 01:42

## 2018-08-27 RX ADMIN — PROMETHAZINE HYDROCHLORIDE PRN MG: 25 INJECTION INTRAMUSCULAR; INTRAVENOUS at 05:48

## 2018-08-27 NOTE — PN
Date of Progress Note:  08/27/2018



Subjective:  The patient is seen and examined.  Chart reviewed and case discussed with RN.  The patie
nt is doing better.  Had debridement by Dr. Dorado.  Case discussed with .  The patient w
ill need SNF placement.  No LTAC benefits.



Review of Systems:

Negative except as above.



Medications:  List reviewed.



Physical Examination:

Vital Signs:  Temperature 97.7, heart rate 75, blood pressure 137/89, respirations 16, O2 95% on room
 air. 

General:  Awake, alert, oriented x3.  No acute distress.  Morbidly obese male, BMI 59, slightly ill-a
ppearing. 

CV:  S1, S2.  No murmurs.  Peripheral pulses present. 

Respiratory:  Moving air well bilaterally.  No wheezing. 

Gastrointestinal:  Abdomen is soft, nontender, nondistended.  Positive bowel sounds. 

Extremities:  No clubbing, cyanosis.  Does have some lower extremity edema. 

Neuro:  flaccid paralysis lower extremities. 

Skin:  Multiple wounds on bilateral lower extremities.



Laboratory Data:  Pending.  Cultures on the right foot shows E. coli and Morganella Morganii, multidr
ug-resistant.  Wound from right foot shows Proteus, sensitive to Merrem.  Wound from left thigh also 
shows E. coli and Morganella morganii, sensitive to meropenem.  Urine culture also growing Morganella
 Morganii, sensitive to Merrem.  Blood cultures negative to date.  Chest x-ray shows PICC line in von
ce.



Assessment And Plan:  

1.Questionable osteomyelitis of the left foot and ankle, status post debridement.  Culture growing o
ut Escherichia coli and Morganella morganii.  Right foot growing out Proteus and sensitive to meropen
em.  The patient will be 2 weeks of IV antibiotics.  PICC line has been placed.  Social Work has been
 consulted for placement to SNF.  The patient voiced understanding and is agreeable to go to SNF for 
IV antibiotics and wound care.

2.Paraplegia with chronic indwelling Ortega catheter.

3.Urinary tract infection, acute cystitis without hematuria.  Urine culture growing out Morganella. 
 Continue meropenem.

4.Chronic back pain.  We will adjust pain medications, try to wean off Demerol, resume p.o. pain med
ications.

5.Spina bifida without hydrocephalus, status post shunt.

6.Stage IV pressure ulcer on the heel.  We will continue wound care offloading.

7.Deep venous thrombosis prophylaxis with Lovenox.

8.Morbid obesity, BMI 59.6.

9.Intractable back pain.





/MAGEN

DD:  08/27/2018 14:53:59Voice ID:  [id number]

DT:  08/27/2018 19:21:10Report ID:  [id number]

## 2018-08-28 LAB
ALBUMIN SERPL BCP-MCNC: 2.3 G/DL (ref 3.4–5)
ALP SERPL-CCNC: 96 U/L (ref 45–117)
ALT SERPL W P-5'-P-CCNC: 28 U/L (ref 12–78)
AST SERPL W P-5'-P-CCNC: 15 U/L (ref 15–37)
BUN BLD-MCNC: 15 MG/DL (ref 7–18)
GLUCOSE SERPLBLD-MCNC: 91 MG/DL (ref 74–106)
HCT VFR BLD CALC: 42 % (ref 39.6–49)
LYMPHOCYTES # SPEC AUTO: 2.3 K/UL (ref 0.7–4.9)
MCH RBC QN AUTO: 26.6 PG (ref 27–35)
MCV RBC: 81 FL (ref 80–100)
PMV BLD: 8 FL (ref 7.6–11.3)
POTASSIUM SERPL-SCNC: 4.1 MMOL/L (ref 3.5–5.1)
RBC # BLD: 5.18 M/UL (ref 4.33–5.43)

## 2018-08-28 RX ADMIN — Medication SCH ML: at 09:45

## 2018-08-28 RX ADMIN — OXYCODONE HYDROCHLORIDE PRN MG: 5 TABLET ORAL at 16:17

## 2018-08-28 RX ADMIN — SODIUM CHLORIDE SCH MLS: 9 INJECTION, SOLUTION INTRAVENOUS at 09:34

## 2018-08-28 RX ADMIN — PROMETHAZINE HYDROCHLORIDE PRN MG: 25 INJECTION INTRAMUSCULAR; INTRAVENOUS at 06:05

## 2018-08-28 RX ADMIN — PROMETHAZINE HYDROCHLORIDE PRN MG: 25 INJECTION INTRAMUSCULAR; INTRAVENOUS at 00:26

## 2018-08-28 RX ADMIN — SODIUM CHLORIDE SCH MLS: 9 INJECTION, SOLUTION INTRAVENOUS at 16:18

## 2018-08-28 RX ADMIN — ENOXAPARIN SODIUM SCH MG: 40 INJECTION SUBCUTANEOUS at 09:35

## 2018-08-28 RX ADMIN — Medication SCH: at 21:00

## 2018-08-28 RX ADMIN — Medication SCH PKT: at 09:00

## 2018-08-28 RX ADMIN — SODIUM CHLORIDE SCH MLS: 0.9 INJECTION, SOLUTION INTRAVENOUS at 00:32

## 2018-08-28 RX ADMIN — OXYCODONE HYDROCHLORIDE PRN MG: 5 TABLET ORAL at 21:27

## 2018-08-28 RX ADMIN — COLLAGENASE SANTYL SCH APPL: 250 OINTMENT TOPICAL at 09:45

## 2018-08-28 RX ADMIN — MEPERIDINE HYDROCHLORIDE PRN MG: 50 INJECTION, SOLUTION INTRAMUSCULAR; INTRAVENOUS; SUBCUTANEOUS at 09:37

## 2018-08-28 RX ADMIN — Medication SCH PKT: at 21:30

## 2018-08-28 RX ADMIN — PROMETHAZINE HYDROCHLORIDE PRN MG: 25 INJECTION INTRAMUSCULAR; INTRAVENOUS at 18:15

## 2018-08-28 RX ADMIN — PROMETHAZINE HYDROCHLORIDE PRN MG: 25 INJECTION INTRAMUSCULAR; INTRAVENOUS at 12:22

## 2018-08-28 RX ADMIN — SODIUM CHLORIDE SCH MLS: 9 INJECTION, SOLUTION INTRAVENOUS at 00:30

## 2018-08-28 RX ADMIN — SODIUM CHLORIDE SCH MLS: 0.9 INJECTION, SOLUTION INTRAVENOUS at 09:35

## 2018-08-28 RX ADMIN — MEPERIDINE HYDROCHLORIDE PRN MG: 50 INJECTION, SOLUTION INTRAMUSCULAR; INTRAVENOUS; SUBCUTANEOUS at 03:37

## 2018-08-28 RX ADMIN — SODIUM CHLORIDE SCH MLS: 0.9 INJECTION, SOLUTION INTRAVENOUS at 21:29

## 2018-08-28 NOTE — PN
Date of Progress Note:  08/28/2018



Subjective:  The patient seen and examined.  Chart reviewed and case discussed with RN and Dr. Dorado.
  The patient does not have any reactions to meropenem.  No rash, hives or other abnormalities.  Tozeinab
y, his insurance issues will be set up with home health to have IV infusions at home given by the [facility] and will have wound care at [facility] and to follow up with Dr. Dorado.  He is not w
illing to go to SNF due to his insurance issues.



Review of Systems:

Negative except as above.



Medications:  Reviewed.



Physical Examination:

Vital Signs:  Temperature 97.8, heart rate 91, blood pressure 136/84, respirations 17, O2 saturation 
96% on room air. 

General:  Awake, alert, oriented x3.  Morbidly obese male, not in any acute distress. 

CV:  S1, S2.  No murmurs.  Peripheral pulses present. 

Respiratory:  Moving air well bilaterally.  No wheezing.  No stridor. 

Gastrointestinal:  Abdomen is soft, nontender, nondistended.  Positive bowel sounds. 

Extremities:  No clubbing, cyanosis.  The patient does have lower extremity edema. 

Neuro:  Flaccid paralysis of the lower extremities. 

Skin:  The patient has multiple wounds on his bilateral heels and thigh.



Laboratory Data:  Sodium 142, potassium 4.1, chloride 110, CO2 26, BUN 15, creatinine 0.6, glucose 91
, calcium 8.8.  WBC 11.8, H and H 13.8, 42, platelets 517, neutrophils 70%.  Cultures:  Urine culture
 growing Morganella Morgagni.  Left heel wound and thigh wound cultures are growing E. coli and Narciso
viktoria Morgagni.  Right foot wound is growing Proteus mirabilis and right tissue wound culture is grow
ing E. coli and Morganella morganii.



Assessment And Plan:  A 32-year-old male with:

1.Sepsis, resolving.

2.Questionable osteomyelitis of the left foot and ankle, status post debridement.

3.Paraplegia with chronic indwelling Ortega catheter.

4.Urinary tract infection, acute cystitis without hematuria secondary to Morganella.  We will contin
ue with Merrem.

5.Multiple wounds growing Escherichia coli, Proteus and Morganella.  We will continue with IV antibi
otics with meropenem.  The patient will need 2 weeks of IV antibiotics.  PICC line is in place.  The 
patient not willing to go to SNF.  We will continue with home health and follow up with Wound Care Cl
in.

6.Chronic back pain.  Wean off IV medications.

7.Spina bifida without hydrocephalus, status post shunt.

8.Stage IV pressure ulcer of the right heel.  Continue offloading.

9.Morbid obesity, BMI 59.6.

10.Deep venous thrombosis prophylaxis with Lovenox.





/MAGEN

DD:  08/28/2018 15:08:49Voice ID:  [id number]

DT:  08/28/2018 20:26:22Report ID:  [id number]

## 2018-08-29 VITALS — TEMPERATURE: 97.5 F | DIASTOLIC BLOOD PRESSURE: 53 MMHG | SYSTOLIC BLOOD PRESSURE: 142 MMHG

## 2018-08-29 VITALS — OXYGEN SATURATION: 97 %

## 2018-08-29 LAB
ALBUMIN SERPL BCP-MCNC: 2.4 G/DL (ref 3.4–5)
ALP SERPL-CCNC: 90 U/L (ref 45–117)
ALT SERPL W P-5'-P-CCNC: 29 U/L (ref 12–78)
AST SERPL W P-5'-P-CCNC: 13 U/L (ref 15–37)
BUN BLD-MCNC: 18 MG/DL (ref 7–18)
GLUCOSE SERPLBLD-MCNC: 125 MG/DL (ref 74–106)
HCT VFR BLD CALC: 40.1 % (ref 39.6–49)
LYMPHOCYTES # SPEC AUTO: 2 K/UL (ref 0.7–4.9)
MCH RBC QN AUTO: 27.3 PG (ref 27–35)
MCV RBC: 81.3 FL (ref 80–100)
PMV BLD: 7.8 FL (ref 7.6–11.3)
POTASSIUM SERPL-SCNC: 4.2 MMOL/L (ref 3.5–5.1)
RBC # BLD: 4.93 M/UL (ref 4.33–5.43)

## 2018-08-29 RX ADMIN — PROMETHAZINE HYDROCHLORIDE PRN MG: 25 INJECTION INTRAMUSCULAR; INTRAVENOUS at 15:01

## 2018-08-29 RX ADMIN — SODIUM CHLORIDE SCH MLS: 9 INJECTION, SOLUTION INTRAVENOUS at 00:40

## 2018-08-29 RX ADMIN — SODIUM CHLORIDE SCH: 0.9 INJECTION, SOLUTION INTRAVENOUS at 05:38

## 2018-08-29 RX ADMIN — OXYCODONE HYDROCHLORIDE PRN MG: 5 TABLET ORAL at 15:00

## 2018-08-29 RX ADMIN — SODIUM CHLORIDE SCH MLS: 9 INJECTION, SOLUTION INTRAVENOUS at 08:30

## 2018-08-29 RX ADMIN — PROMETHAZINE HYDROCHLORIDE PRN MG: 25 INJECTION INTRAMUSCULAR; INTRAVENOUS at 00:34

## 2018-08-29 RX ADMIN — Medication SCH PKT: at 08:30

## 2018-08-29 RX ADMIN — OXYCODONE HYDROCHLORIDE PRN MG: 5 TABLET ORAL at 08:30

## 2018-08-29 RX ADMIN — PROMETHAZINE HYDROCHLORIDE PRN MG: 25 INJECTION INTRAMUSCULAR; INTRAVENOUS at 08:31

## 2018-08-29 RX ADMIN — SODIUM CHLORIDE SCH MLS: 9 INJECTION, SOLUTION INTRAVENOUS at 16:02

## 2018-08-29 RX ADMIN — ENOXAPARIN SODIUM SCH MG: 40 INJECTION SUBCUTANEOUS at 08:30

## 2018-08-29 RX ADMIN — COLLAGENASE SANTYL SCH APPL: 250 OINTMENT TOPICAL at 08:32

## 2018-08-29 RX ADMIN — Medication SCH ML: at 08:31

## 2018-08-29 NOTE — PN
Date of Progress Note:  08/29/2018



Subjective:  The patient is seen and examined.  Chart reviewed and case discussed with RN.  The patie
nt complaining of pain after Demerol was discontinued.  The patient does display some pain seeking be
havior, asking the nurse methodology of how he administers pain medication, whether or not he adminis
ter the pain medication and the nausea medication phenergan together if he gives a fast push or a slo
w push displaying pain drug seeking behavior.  According to the primary nurse, the patient does not a
ppear to be in distress, otherwise has been okay with bed adjustments and has not seemed to be in vilma
n.



Review of Systems:

Negative except as above.



Medications:  List reviewed.



Physical Examination:

Vital Signs:  Temperature 97.9, heart rate 84, blood pressure 137/84, respirations 18, O2 97% on room
 air. 

General:  Awake, alert, oriented x3, not in any acute distress.  Morbidly obese male. 

CV:  S1, S2.  No murmurs.  Peripheral pulses present. 

Respiratory:  Moving air well bilaterally.  No wheezing. 

Gastrointestinal:  Abdomen is soft, nontender, nondistended.  Positive bowel sounds. 

Extremities:  No clubbing, cyanosis, edema. 

Neuro:  The patient has flaccid paralysis of the lower extremities. 

Skin:  Multiple wounds on the heels and thigh.



Laboratory Data:  Sodium 142, potassium 4.2, chloride 108, CO2 26, BUN 18, creatinine 0.6, glucose 12
5, calcium 8.8, albumin 2.4.  WBC 10.2, H and H 13.5 and 40.1, platelets 484.  Urine culture shows Mo
rganella.  Wound culture from the left heel shows E. coli and Morganella.  Wound culture from the rig
ht foot shows Proteus, E. coli, and Morganella.



Assessment And Plan:  A 32-year-old male with:

1.Sepsis, resolving.

2.Questionable osteomyelitis of the left foot and ankle, status post debridement.

3.Paraplegia with chronic indwelling Ortega catheter.

4.Acute cystitis without hematuria secondary to Morganella morganii.  The patient does have a chroni
c indwelling catheter.  We will continue with meropenem.

5.Multiple wounds growing Escherichia coli, Proteus, and Morganella on the bilateral heels and thigh
.  We will continue with IV antibiotics.  The patient will need 2 weeks of IV antibiotics, currently 
being set up through home health.  The patient declining SNF placement.

6.Chronic back pain.  Continue home medications, narcotic.

7.Spina bifida without hydrocephalus, status post shunt.

8.Stage IV pressure ulcer of the right heel.  Continue offloading and wound care.

9.Morbid obesity, BMI 59.6.

10.Deep venous thrombosis prophylaxis with Lovenox.



Plan:  Discharge home with home health and IV antibiotics set up.





/MAGEN

DD:  08/29/2018 13:50:29Voice ID:  [id number]

DT:  08/29/2018 18:17:06Report ID:  [id number]

## 2018-09-01 NOTE — DS
Date of Discharge:  08/29/2018



Consultants:  Dr. Dorado with General Surgery.



Procedures:  Debridement of wounds on the lower extremity.



Admitting Diagnoses:  

1.Intractable back pain.

2.Leukocytosis.

3.Chronic indwelling Ortega catheter.

4.Osteomyelitis.

5.Paraplegic spinal paralysis.

6.Spina bifida.

7.Stage IV pressure ulcer of the heel.



Discharge Diagnoses:  

1.Sepsis, resolved.

2.Questionable osteomyelitis of the left foot and ankle, status post debridement.

3.Paraplegia with chronic indwelling Ortega catheter.

4.Acute cystitis without hematuria secondary to Morganella Morgagni.

5.Chronic Ortega catheter, suprapubic.

6.Multiple wounds on the bilateral heels and thigh growing Escherichia coli, Proteus, and Morganella
.

7.Chronic back pain, on chronic narcotics.

8.Spina bifida without hydrocephalus, status post shunt.

9.Stage IV pressure ulcer of the right heel.

10.Morbid obesity, BMI of 59.



Hospital Course:  The patient is a 32-year-old male with complicated medical history of obesity, spin
a bifida, bedbound with chronic left leg pressure ulcer, osteomyelitis, follows with [facility] Dorado [facility].  The patient also has neurogenic bladder with suprapubic catheter. 
 The patient comes in with severe back pain.  Unable to urinate.  The patient's suprapubic catheter w
as replaced.  He was replaced recently.  The patient was started on IV antibiotics and pain medicatio
ns.  He did have an elevated white count with left shift.  Cultures were obtained.  Blood cultures di
d not grow any organisms.  However, the urine culture grew out Morganella Morgagni.  The wound cultur
es from the left heel and thigh grew out E. coli and Morganella; from the right foot grew out Proteus
, E. coli, and Morganella.  The patient was seen by Dr. Dorado, who has been following him for the pas
t several years and the patient had debridement done.  There was some questionable osteomyelitis.  Th
e patient had to have a PICC line placed for long-term IV antibiotics due to his multiresistant organ
isms in his wounds.  Home health was set up as the patient declined SNF even though I counseled him t
hat he needs more around-the-clock care and wound care; however, due to his insurance issues as he ha
s lost insurance when it is converted back to [employer], he declined SNF and wished to pursu
e home health care, IV infusion.  The patient was then set up with IV infusion therapy and was then d
ischarged.  His sepsis had resolved, white count normalized.  The patient was counseled regarding his
 wound care.  He voiced understanding.



Followup:  Follow up with primary care physician in 2-3 days.  Follow up with surgeon, Dr. Dorado at [facility] in 1 week.  Return to ER for worsening condition.  Complete IV infusion therapy. 
 Weekly CBC, CRP, ESR, and CRP.  Repeat cultures after antibiotics are completed.



Diet:  Heart healthy.



Activity:  As tolerated. 



The patient will finish up meropenem 1 g q.8 hours for 2 weeks total.



Physical Examination:

For physical exam findings, please see progress note dictated on the day of discharge.





BINDU

DD:  08/31/2018 18:18:03Voice ID:  [id number]

DT:  09/01/2018 07:40:00Report ID:  [id number]

## 2018-09-18 ENCOUNTER — HOSPITAL ENCOUNTER (OUTPATIENT)
Dept: HOSPITAL 97 - ER | Age: 33
Setting detail: OBSERVATION
LOS: 1 days | Discharge: HOME HEALTH | End: 2018-09-19
Attending: FAMILY MEDICINE | Admitting: FAMILY MEDICINE
Payer: COMMERCIAL

## 2018-09-18 VITALS — BODY MASS INDEX: 59.3 KG/M2

## 2018-09-18 DIAGNOSIS — Q05.9: ICD-10-CM

## 2018-09-18 DIAGNOSIS — Z99.3: ICD-10-CM

## 2018-09-18 DIAGNOSIS — Z88.0: ICD-10-CM

## 2018-09-18 DIAGNOSIS — L97.429: ICD-10-CM

## 2018-09-18 DIAGNOSIS — E87.5: ICD-10-CM

## 2018-09-18 DIAGNOSIS — Z87.440: ICD-10-CM

## 2018-09-18 DIAGNOSIS — G62.9: ICD-10-CM

## 2018-09-18 DIAGNOSIS — L97.419: Primary | ICD-10-CM

## 2018-09-18 DIAGNOSIS — M79.671: ICD-10-CM

## 2018-09-18 DIAGNOSIS — L97.129: ICD-10-CM

## 2018-09-18 DIAGNOSIS — G89.29: ICD-10-CM

## 2018-09-18 DIAGNOSIS — Z88.2: ICD-10-CM

## 2018-09-18 LAB
BUN BLD-MCNC: 14 MG/DL (ref 7–18)
GLUCOSE SERPLBLD-MCNC: 81 MG/DL (ref 74–106)
HCT VFR BLD CALC: 44.3 % (ref 39.6–49)
LYMPHOCYTES # SPEC AUTO: 1.4 K/UL (ref 0.7–4.9)
MCH RBC QN AUTO: 26.8 PG (ref 27–35)
MCV RBC: 80 FL (ref 80–100)
PMV BLD: 8.5 FL (ref 7.6–11.3)
POTASSIUM SERPL-SCNC: 5.2 MMOL/L (ref 3.5–5.1)
RBC # BLD: 5.53 M/UL (ref 4.33–5.43)
UA COMPLETE W REFLEX CULTURE PNL UR: (no result)

## 2018-09-18 PROCEDURE — 87040 BLOOD CULTURE FOR BACTERIA: CPT

## 2018-09-18 PROCEDURE — 87086 URINE CULTURE/COLONY COUNT: CPT

## 2018-09-18 PROCEDURE — 84145 PROCALCITONIN (PCT): CPT

## 2018-09-18 PROCEDURE — 87077 CULTURE AEROBIC IDENTIFY: CPT

## 2018-09-18 PROCEDURE — 84132 ASSAY OF SERUM POTASSIUM: CPT

## 2018-09-18 PROCEDURE — 96372 THER/PROPH/DIAG INJ SC/IM: CPT

## 2018-09-18 PROCEDURE — 36415 COLL VENOUS BLD VENIPUNCTURE: CPT

## 2018-09-18 PROCEDURE — 80048 BASIC METABOLIC PNL TOTAL CA: CPT

## 2018-09-18 PROCEDURE — 87088 URINE BACTERIA CULTURE: CPT

## 2018-09-18 PROCEDURE — 87205 SMEAR GRAM STAIN: CPT

## 2018-09-18 PROCEDURE — 87070 CULTURE OTHR SPECIMN AEROBIC: CPT

## 2018-09-18 PROCEDURE — 83735 ASSAY OF MAGNESIUM: CPT

## 2018-09-18 PROCEDURE — 87186 SC STD MICRODIL/AGAR DIL: CPT

## 2018-09-18 PROCEDURE — 85025 COMPLETE CBC W/AUTO DIFF WBC: CPT

## 2018-09-18 PROCEDURE — 87493 C DIFF AMPLIFIED PROBE: CPT

## 2018-09-18 PROCEDURE — 99285 EMERGENCY DEPT VISIT HI MDM: CPT

## 2018-09-18 PROCEDURE — 81003 URINALYSIS AUTO W/O SCOPE: CPT

## 2018-09-18 PROCEDURE — 81015 MICROSCOPIC EXAM OF URINE: CPT

## 2018-09-18 RX ADMIN — PANTOPRAZOLE SODIUM SCH MG: 40 TABLET, DELAYED RELEASE ORAL at 21:46

## 2018-09-18 RX ADMIN — OXYBUTYNIN CHLORIDE SCH MG: 5 TABLET, EXTENDED RELEASE ORAL at 21:47

## 2018-09-18 RX ADMIN — GABAPENTIN SCH MG: 300 CAPSULE ORAL at 21:47

## 2018-09-18 RX ADMIN — Medication SCH PATCH: at 21:47

## 2018-09-18 RX ADMIN — ENOXAPARIN SODIUM SCH MG: 40 INJECTION SUBCUTANEOUS at 21:45

## 2018-09-18 NOTE — RAD REPORT
EXAM DESCRIPTION:  RAD - Foot Right 2 View - 9/18/2018 2:25 pm

 

CLINICAL HISTORY:  Soft tissue swelling, multifocal soft tissue wounds, possible osteomyelitis

 

COMPARISON:  August 22nd

 

FINDINGS:  No fracture, dislocation or periosteal reaction. There is a splayed configuration of the t
oes due to the very pronounced soft tissue swelling. No erosive or destructive bone process identifia
ble. There is laxity or widening of the joint spaces of the calcaneus and talus articulation with the
 navicular and cuboid bones. This pattern is stable. No grossly destructive bone process to indicate 
osteomyelitis. The very pronounced soft tissue swelling pattern around the foot, ankle and distal leg
 has not changed. No air or foreign body.

 

IMPRESSION:  No erosive or destructive bone process seen to localize a site of osteomyelitis.

 

Very pronounced soft tissue swelling not grossly different from comparison.

## 2018-09-18 NOTE — EDPHYS
Physician Documentation                                                                           

 [facility]                                                                

Name: Redd Dale Jr                                                                            

Age: 32 yrs                                                                                       

Sex: Male                                                                                         

: 1985                                                                                   

MRN: [id number]                                                                                   

Arrival Date: 2018                                                                          

Time: 11:16                                                                                       

Account#: [id number]                                                                            

Bed 6                                                                                             

Private MD:                                                                                       

ED Physician Anuel Berry                                                                         

HPI:                                                                                              

                                                                                             

13:35 This 32 yrs old Black Male presents to ER via EMS with complaints of Foot Pain.         rn  

13:35 The patient presents with pain. The complaints affect the right foot. Onset: The        rn  

      symptoms/episode began/occurred at an unknown time. Modifying factors: The symptoms are     

      alleviated by nothing, the symptoms are aggravated by movement. Severity of symptoms:       

      At their worst the symptoms were moderate, in the emergency department the symptoms are     

      unchanged. The patient has experienced similar episodes in the past. Reports called 911     

      for foot pain, right side, that shoots up right side of his body, recently admitted         

      here for sepsis, had PICC line placed, 2 weeks of meropenem, and recently stopped           

      course a few days ago, reports ever since left the hospital this last admission he has      

      been experiencing intermittent pains, reports has appt with wound care in 2 days,           

      hasn't seen them for 2 weeks. No fever. Also reports urine is cloudy again. .               

                                                                                                  

Historical:                                                                                       

- Allergies:                                                                                      

11:23 Amoxicillin;                                                                            ss  

11:23 Bactrim;                                                                                ss  

11:23 Ciprofloxacin;                                                                          ss  

11:23 CLAVULANIC ACID;                                                                        ss  

11:23 Doxycycline;                                                                            ss  

11:23 Levofloxacin;                                                                           ss  

11:23 Morphine;                                                                               ss  

11:23 Toradol;                                                                                ss  

11:23 Vancomycin;                                                                             ss  

11:23 Zofran;                                                                                 ss  

19:08 TRIMETHOPRIM;                                                                           bp  

- Home Meds:                                                                                      

19:08 metoprolol tartrate 25 mg Oral tab 1 tab 2 times per day [Active]; pantoprazole 40 mg   bp  

      Oral TbEC 1 tab once daily [Active]; ProMod Protein Oral liqd 30 mL twice a day             

      [Active]; Vitamin C 500 mg Oral tab twice a day [Active]; zinc sulfate 220 (50) mg Oral     

      cap daily [Active];                                                                         

- PMHx:                                                                                           

11:23 Asthma; Cerebral Palsy; cluster headaches; decubitus ulcers on feet; GERD;              ss  

      Hydrocephalus; Hypertension; spina bifida;                                                  

                                                                                                  

- Immunization history:: Adult Immunizations up to date.                                          

- Social history:: Smoking status: Patient uses tobacco products, smokes one-half pack            

  cigarettes per day.                                                                             

- Ebola Screening: : Patient denies exposure to infectious person Patient denies travel           

  to an Ebola-affected area in the 21 days before illness onset.                                  

- Family history:: not pertinent.                                                                 

- Hospitalizations: : No recent hospitalization is reported.                                      

                                                                                                  

                                                                                                  

ROS:                                                                                              

13:35 Constitutional: Negative for fever, chills, and weight loss, Eyes: Negative for injury, rn  

      pain, redness, and discharge, Neck: Negative for injury, pain, and swelling,                

      Cardiovascular: Negative for chest pain, palpitations, and edema, Respiratory: Negative     

      for shortness of breath, cough, wheezing, and pleuritic chest pain, Abdomen/GI:             

      Negative for abdominal pain, nausea, vomiting, diarrhea, and constipation,                  

      MS/Extremity: Negative for injury Skin: + chronic wounds to bilateral lower ext Neuro:      

      Negative for headache, and seizure.                                                         

                                                                                                  

Exam:                                                                                             

13:35 Constitutional:  This is a well developed, well nourished patient who is awake, alert,  rn  

      and in no acute distress. Head/Face:  Normocephalic, atraumatic. Eyes:  Pupils equal        

      round and reactive to light, extra-ocular motions intact.  Lids and lashes normal.          

      Conjunctiva and sclera are non-icteric and not injected.  Cornea within normal limits.      

      Periorbital areas with no swelling, redness, or edema. Cardiovascular:  Tachycardic,        

      regular Respiratory:  No increased work of breathing, no retractions or nasal flaring.      

      MS/ Extremity:  Pulses equal, no cyanosis. + moderate chronic swelling of bilateral         

      feet with inward rotation, + bilateral pressure wounds with beefy red tissue                

      surrounding central fibrinous caps, no fluctuance, + clear drainage, no crepitus, no        

      gangrene.                                                                                   

                                                                                                  

Vital Signs:                                                                                      

11:23  / 89; Pulse 104; Resp 18; Temp 98.8(O); Pulse Ox 99% on R/A; Weight 131.54 kg;   ss  

      Pain 9/10;                                                                                  

12:30  / 90; Pulse 91; Resp 17; Pulse Ox 100% on R/A;                                   hb  

13:30  / 92; Pulse 100; Resp 16; Pulse Ox 100% on R/A;                                  hb  

14:00  / 91; Pulse 93; Resp 14; Pulse Ox 100% on R/A;                                   hb  

15:00  / 87; Pulse 100; Resp 15; Pulse Ox 100% on R/A;                                  hb  

17:00  / 90; Pulse 91; Resp 16; Pulse Ox 100% on R/A;                                   hb  

18:15  / 86; Pulse 72; Resp 15; Pulse Ox 100% on R/A;                                   hb  

19:00  / 87; Pulse 99; Resp 14; Pulse Ox 97% ;                                          bp  

                                                                                                  

MDM:                                                                                              

11:17 Patient medically screened.                                                             rn  

17:06 Differential diagnosis: cellulitis, UTI. Data reviewed: vital signs, nurses notes, lab  rn  

      test result(s), radiologic studies, plain films, and as a result, I will admit patient.     

      Counseling: I had a detailed discussion with the patient and/or guardian regarding: the     

      historical points, exam findings, and any diagnostic results supporting the                 

      discharge/admit diagnosis, lab results, radiology results, the need for further work-up     

      and treatment in the hospital. Response to treatment: the patient's symptoms have           

      mildly improved after treatment, and as a result, I will admit patient. Admission           

      orders: after a detailed discussion of the patient's condition and case, the admit          

      orders are written by me. ED course: Pt with persistent pain, wounds look worse than        

      last time, + persistent UTI with very resistant previous cultures, will admit for           

      further care to Dr. Isabel. .                                                               

                                                                                                  

                                                                                             

11:46 Order name: CBC with Diff; Complete Time: 14:03                                         rn  

                                                                                             

11:46 Order name: Basic Metabolic Panel; Complete Time: 14:03                                 rn  

                                                                                             

11:46 Order name: Urine Culture                                                               rn  

                                                                                             

11:46 Order name: Urine Microscopic Only; Complete Time: 16:24                                rn  

                                                                                             

11:46 Order name: Procalcitonin; Complete Time: 17:14                                         rn  

                                                                                             

11:46 Order name: Blood Culture Adult (2)                                                     rn  

                                                                                             

15:57 Order name: Urine Dipstick--Ancillary (enter results); Complete Time: 16:24             bd  

                                                                                             

17:51 Order name: Potassium                                                                   [facility]

                                                                                             

17:51 Order name: Urinalysis                                                                  [facility]

                                                                                             

17:51 Order name: Basic Metabolic Panel                                                       [facility]

                                                                                             

17:51 Order name: Basic Metabolic Panel                                                       [facility]

                                                                                             

17:51 Order name: Basic Metabolic Panel                                                       [facility]

                                                                                             

17:51 Order name: Basic Metabolic Panel                                                       [facility]

                                                                                             

17:51 Order name: CBC with Automated Diff                                                     [facility]

                                                                                             

11:47 Order name: XRAY Foot RIGHT 2 View; Complete Time: 14:59                                rn  

                                                                                             

11:47 Order name: XRAY Foot LEFT 2 View; Complete Time: 14:59                                 rn  

                                                                                             

16:54 Order name: Diet Regular; Complete Time: 16:54                                          hb  

                                                                                             

17:51 Order name: CONS Physician Consult                                                      [facility]

                                                                                             

17:51 Order name: Heart Healthy                                                               [facility]

                                                                                             

17:51 Order name: CBC with Automated Diff                                                     [facility]

                                                                                             

17:51 Order name: CBC with Automated Diff                                                     [facility]

                                                                                             

17:51 Order name: CBC with Automated Diff                                                     [facility]

                                                                                             

17:51 Order name: Magnesium                                                                   [facility]

                                                                                             

17:51 Order name: Magnesium                                                                   [facility]

                                                                                             

17:51 Order name: Magnesium                                                                   [facility]

                                                                                             

17:51 Order name: Magnesium                                                                   [facility]

                                                                                             

11:46 Order name: Urine Dipstick-Ancillary (obtain specimen); Complete Time: 18:14            rn  

                                                                                             

17:51 Order name: NPO                                                                         [facility]

                                                                                                  

Administered Medications:                                                                         

13:02 Drug: Demerol 50 mg Route: IM; Site: right deltoid;                                     hb  

13:45 Follow up: Response: No adverse reaction; Pain is decreased                             hb  

13:32 Not Given (Physician Discretion): Demerol 50 mg IVP once                                hb  

17:16 Drug: Demerol 25 mg Route: IM; Site: right deltoid;                                     hb  

18:14 Follow up: Response: No adverse reaction; Pain is decreased                             hb  

18:28 Not Given (Other Intervention Used): NS 0.9% 500 ml IV at bolus once                    hb  

                                                                                                  

                                                                                                  

Disposition:                                                                                      

18 17:08 Hospitalization ordered by Akhil Isabel for Observation. Preliminary             

  diagnosis are Urinary tract infection, site not specified, Non-pressure                         

  chronic ulcer of other part of left foot, Non-pressure chronic ulcer of other                   

  part of right foot.                                                                             

- Bed requested for Telemetry/MedSurg (observation).                                              

- Status is Observation.                                                                      ak1 

- Condition is Stable.                                                                            

- Problem is an ongoing problem.                                                                  

- Symptoms are unchanged.                                                                         

UTI on Admission? Yes                                                                             

                                                                                                  

                                                                                                  

                                                                                                  

Signatures:                                                                                       

[facility]                                                 

Kathy Kowalski RN RN                                                      

Anuel Berry MD MD rn Smirch, Shelby, RN RN                                                      

Radha Brambila RN RN   ak1                                                  

Bryson, Josefa, RN                     RN   [facility], Huan, RN                      RN   bp                                                   

                                                                                                  

Corrections: (The following items were deleted from the chart)                                    

18:24 17:08 Hospitalization Ordered by Akhil Isabel DO for Observation. Preliminary          dw  

      diagnosis is Urinary tract infection, site not specified; Non-pressure chronic ulcer of     

      other part of left foot; Non-pressure chronic ulcer of other part of right foot. Bed        

      requested for Telemetry/MedSurg (observation). Status is Observation. Condition is          

      Stable. Problem is an ongoing problem. Symptoms are unchanged. UTI on Admission? Yes. rn    

19:03 11:46 IV Saline Lock ordered. rn                                                        macho  

20:00 18:24 2018 17:08 Hospitalization Ordered by Akhil Isabel DO for Observation.     ak1 

      Preliminary diagnosis is Urinary tract infection, site not specified; Non-pressure          

      chronic ulcer of other part of left foot; Non-pressure chronic ulcer of other part of       

      right foot. Bed requested for Telemetry/MedSurg (observation). Status is Observation.       

      Condition is Stable. Problem is an ongoing problem. Symptoms are unchanged. UTI on          

      Admission? Yes. dw                                                                          

                                                                                                  

**************************************************************************************************

## 2018-09-18 NOTE — XMS REPORT
[facility]

 Created on:2018



Patient:Redd Dale

Sex:Male

:1985

External Reference #:[id number]





Demographics







 Address  [address] [place] [address]

 

 Phone  [phone]

 

 Preferred Language  en

 

 Marital Status  Unknown

 

 [employer] Affiliation  Unknown

 

 Race  Black or 

 

 Ethnic Group  Not  or 









Author







 Organization  [employer]









Care Team Providers







 Name  Role  Phone

 

 Knox, Na  Provider Role  Unavailable









Allergies

No Known Allergies



Problems







 Problem Type  Condition  Code  Onset Dates  Condition Status

 

 Problem  Nicotine dependence  F17.200    Active

 

 Problem  Lumbar spina bifida with  Q05.2    Active



   hydrocephalus      

 

 Problem  Dependence on wheelchair  Z99.3    Active

 

 Problem  Fecal incontinence  R15.9    Active

 

 Problem  Foot ulcer  L97.509    Active

 

 Problem  Depression with anxiety  F41.8    Active

 

 Problem  Paraplegia  G82.20    Active

 

 Problem  Constipation  K59.00    Active

 

 Problem  Incontinence of urine  R32    Active

 

 Problem  Nausea alone  R11.0    Active

 

 Problem  Episodic tension-type headache, not  G44.219    Active



   intractable      

 

 Problem  Nonintractable episodic headache,  R51    Active



   unspecified headache type      

 

 Problem   (ventriculoperitoneal) shunt  Z98.2    Active



   status      







Medications

No Known Medications



Results

No Known Results



Summary Purpose

[employer] Submission

## 2018-09-18 NOTE — P.HP
Certification for Inpatient


Patient admitted to: Observation


With expected LOS: <2 Midnights


Patient will require the following post-hospital care: None


Practitioner: I am a practitioner with admitting privileges, knowledge of 

patient current condition, hospital course, and medical plan of care.


Services: Services provided to patient in accordance with Admission 

requirements found in Title 42 Section 412.3 of the Code of Federal Regulations





Patient History


Date of Service: 09/18/18


Primary Care Provider: Dr. Knox; Surgery-Dr. Dorado


Reason for admission: Right lower extremity ulcer pain


History of Present Illness: 





32-year-old  male with multiple chronic wounds to the lower 

extremity.  Patient also has a history of recurrent UTI.  Patient was recently 

hospitalized for UTI a requiring IV antibiotic therapy.  Patient came into the 

emergency room complaining of pain to the right lower extremity.  He has a 

large ulcer to the wound.  He was to follow up with wound care over the past 2 

weeks but has not.  He was post to follow up with him again this week.  Patient 

came to the ER for evaluation.





In the ER patient evaluated.  White count normal at 9.6, sodium 139, potassium 

4.2.  Urinalysis showed some leukocytes.  Pain to the right lower extremity 

noted. Patient was admitted for observation.





When I saw the patient the ER, he appeared stable.  No significant erythema 

noted. Ulcer noted to the right lower extremity.  Pain noted with palpation.


Allergies





levofloxacin [From Levaquin] Allergy (Intermediate, Verified 08/23/18 00:05)


 Hives


morphine Allergy (Intermediate, Verified 08/23/18 00:05)


 Hives


sulfamethoxazole [From Bactrim] Allergy (Intermediate, Verified 08/23/18 00:05)


 Hives


ketorolac tromethamine [From Toradol] Allergy (Verified 08/23/18 00:05)


 Nausea/Vomiting


vancomycin Allergy (Verified 08/23/18 00:05)


 Hives


ondansetron [From Zofran (as hydrochloride)] Adverse Reaction (Mild, Verified 08 /23/18 00:05)


 Nausea/Vomiting


amoxicillin [From Augmentin] Adverse Reaction (Verified 08/23/18 00:05)


 Hives/Rash


ciprofloxacin Adverse Reaction (Verified 08/23/18 00:05)


 Nausea/Vomiting


doxycycline Adverse Reaction (Verified 03/23/18 04:43)


 Nausea/Vomiting


sesame seed Adverse Reaction (Verified 08/22/18 23:56)


 diarrhea


pop corn Adverse Reaction (Uncoded 08/22/18 23:56)


 diarrhea





Home medications list reviewed: Yes


Home Medications: 








Oxycodone HCl [Roxicodone] 5 mg PO QID PRN 08/23/18 


Collagenase [Santyl Ointment*] 1 appl TOP DAILY #1 tube 08/29/18 


Yordan [Yordan*] 1 pkt PO BID  powd.pack 08/29/18 


Meropenem [Merrem 1 GM/100 ML NS IVPB] 1 gm IV Q8HR #1 bag 08/29/18 








- Past Medical/Surgical History


Diabetic: No


-: Spina bifida


-: History of osteomyelitis


-: History of recurrent UTIs with sepsis


-: Hydrocephalus


-: Cranial to perineal shunt


-: Migraines


-: Chronic indwelling suprapubic catheter


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


-: Shunt revision


-: Cholecystectomy


-: Foot surgery


-: Hip sx BL


-: Bilateral hip surgery


-: Appendectomy


Psychosocial/ Personal History: Patient currently single.  He has no children.  

He is disabled.





- Family History


  ** Father


-: Hypertension





  ** Mother


-: Hypertension





- Social History


Smoking Status: Unknown if ever smoked


Alcohol use: Yes


CD- Drugs: No


Caffeine use: Yes


Place of Residence: Home





Review of Systems


General: Weakness, Malaise


Eyes: Unremarkable


ENT: Unremarkable


Respiratory: Unremarkable


Cardiovascular: Unremarkable


Gastrointestinal: Unremarkable


Genitourinary: As per HPI


Musculoskeletal: As per HPI


Integumentary: As per HPI


Neurological: Unremarkable


Lymphatics: Unremarkable





Physical Examination





- Physical Exam


General: Alert, In no apparent distress, Oriented x3, Cooperative


HEENT: Atraumatic


Neck: Supple


Respiratory: Clear to auscultation bilaterally, Normal air movement


Cardiovascular: Normal pulses, Regular rate/rhythm


Gastrointestinal: Normal bowel sounds, Soft and benign, Non-distended, No 

tenderness, No masses, No rebound, No guarding


Musculoskeletal: Other (Large ulcer noted to the right foot.  Patient with 

history of spina bifida.  Deformities noted to the lower extremity.  Pain to 

the right foot ulcer noted.)


Neurological: Normal speech, Normal tone, Other (Patient is wheelchair-bound.)





- Studies


Laboratory Data (last 24 hrs)





09/18/18 13:15: Sodium 139, Potassium 5.2 H, BUN 14, Creatinine 0.80, Glucose 81


09/18/18 13:15: WBC 9.6, Hgb 14.8, Hct 44.3, Plt Count 405








Assessment and Plan





- Plan





Impression:


Right lower extremity pain with chronic ulcer to the right foot suspect 

cellulitis


History of recurrent UTI, recent UTI requiring IV antibiotic therapy


Patient with history of spina bifida.


Chronic pain


Neuropathy


Hyperkalemia





Plan:


Will start IV meropenem.  Blood cultures obtained.  Including urine culture.  

Will have surgery evaluate patient as the patient was to be seen by a wound 

care.  Patient may require debridement.  Will discuss further with surgery 

tomorrow.  If stable patient may be able to be discharged home if okay with 

surgery.  Will continue with his chronic pain medication.  Will limit IV pain 

medication.  Will recheck potassium level.


Discharge Plan: Home


Plan to discharge in: 48 Hours





- Advance Directives


Does patient have a Living Will: No


Does patient have a Durable POA for Healthcare: No





- Code Status/Comfort Care


Code Status Assessed: Yes (Patient full code.)


Time Spent Managing Pts Care (In Minutes): 55

## 2018-09-18 NOTE — XMS REPORT
Continuity of Care Document

 Created on:May 8, 2018



Patient:JORDAN VERDIN JR

Sex:Male

:1985

External Reference #:[id number]





Demographics







 Address  [address]  [place] [address]

 

 Phone  [phone]

 

 Phone  [phone]

 

 Preferred Language  Unknown

 

 Marital Status  Unknown

 

 Orthodox Affiliation  Unknown

 

 Race  Unknown

 

 Ethnic Group  Unknown









Author







 Organization  [facility]









Problems







 Problem  Status  Onset  Classification  Date  Comments  Source



     Date    Reported    



             



             



             

 

 SHUNT MALFUNCTION  Active  20        [address]



             



             

 

 ACUTE HEADACHE  Active  20        [address]



             



             

 

 Acute pain  Active    Problem  2018    [facility]



             



             

 

 Asthma  Resolved    Problem  2018    [facility]



             



             

 

 Bronchitis  Resolved    Problem  2018    [facility]



             



             

 

 Cerebral palsy  Resolved    Problem  2018    [facility]



             



             

 

 Headache  Active    Problem  2018    [facility]



             



             

 

 Hydrocephalus  Resolved    Problem  2018    [facility]



             



             

 

 Osteomyelitis  Resolved    Problem  2018    [facility]



             



             

 

 HEADACHE  Active          [facility]



             



             







Medications







 Medication  Details  Route  Status  Patient  Ordering  Order  Source



         Instructions  Provider  Date  



               



               



               

 

 Docusate Sodium  100 mg=1 cap,    Active      [facility] [place]



 100 MG Oral  PO, BID, 0          2018  Medical



 Capsule  Refill(s)            [place]



               



               

 

 Zosyn  0 Refill(s)    Active        [facility] [place]



             2018  Medical



               [place]



               



               

 

 celecoxib 200  200 mg=1 cap,    Active      [facility] [place]



 mg oral capsule  PO, BID, 0          2018  Medical



   Refill(s)            [place]



               



               

 

 ascorbic acid  500 mg=1 tab,    Active      [facility] [place]



   PO, BID, 0          2018  Medical



   Refill(s)            Center



               



               

 

 acetaminophen  1,000 mg=2 tab,    Active      [facility] [place]



 500 mg oral  PO, Q6Hnow, 0          2018  Medical



 tablet  Refill(s)            [place]



               



               

 

 Oxycodone  5 mg=1 tab, PO,    Active      [facility] [place]



 Hydrochloride 5  Q4H, PRN Pain          2018  Medical



 MG Oral Tablet  Score 4-6, 0            Center



   Refill(s)            



               



               

 

 zinc sulfate  220 mg=1 cap,    Active      [facility] [place]



 220 mg oral  PO, Daily, 0          2018  Medical



 capsule  Refill(s)            [place]



               



               

 

 multivitamin  1 tab, PO,    Active      [facility] [place]



   Daily, 0          2018  Medical



   Refill(s)            Center



               



               

 

 methocarbamol  1,000 mg=2 tab,    Active      [facility] [place]



 500 mg oral  PO, Q8H, 0          2018  Medical



 tablet  Refill(s)            Center



               



               

 

 LORazepam 0.5  0.5 mg=1 tab,    Active         [place]



 mg oral tablet  PO, Q8H, PRN          2018  Medical



   Anxiety, 0            Center



   Refill(s)            



               



               

 

 Lidocaine  3 patch, TOP,    Active        [facility] [place]



 Hydrochloride  Daily, Remove          2018  Medical



 0.05 MG/MG  after 12 hours,            Center



 Transdermal  0 Refill(s)            



 Patch              



 [Lidoderm]              



               



               

 

 Robaxin  1,000 mg, 2    No Longer        [facility]



   tab, Route: PO,    Active      2018  Medical



   Drug form: TAB,            Center



   Q8H, Dosing            



   Weight 127.027,            



   kg, Start date:            



   18            



   16:00:00 CDT,            



   Duration: 30            



   day, Stop date:            



   18            



   8:00:00            



   CDTNotes: (Same            



   as:Robaxin)            



               



               

 

 Oxycodone  10 mg, 2 tab,    No Longer        [facility] [place]



 Hydrochloride 5  Route: PO, Drug    Active      2018  Medical



 MG Oral Tablet  form: TAB,            Center



   Daily, Dosing            



   Weight 127.027,            



   kg, PRN            



   Procedure,            



   Start date:            



   18            



   13:06:00 CDT,            



   Duration: 30            



   day, Stop date:            



   18            



   13:05:00            



   CDTNotes: (Same            



   as: Roxicodone)            



               



               

 

 Ativan  0.5 mg, 1 tab,    No Longer        [facility]



   Route: PO, Drug    Active        [facility]



   form: TAB, Q8H,            Center



   Dosing Weight            



   127.027, kg,            



   PRN Anxiety,            



   Start date:            



   18            



   10:18:00 CDT,            



   Duration: 7            



   day, Stop date:            



   18            



   10:17:00            



   CDTNotes: (Same            



   as: Ativan)            



               



               

 

 Trazodone  50 mg, 1 tab,    No Longer        [facility] [place]



 Hydrochloride  Route: PO, Drug    Active      2018  Medical



 50 MG Oral  form: TAB,            Center



 Tablet  Bedtime, Dosing            



   Weight 127.027,            



   kg, Start date:            



   18            



   21:00:00 CDT,            



   Duration: 30            



   day, Stop date:            



   18            



   21:00:00            



   CDTNotes: (Same            



   As: Desyrel)            



               



               

 

 remove patch  3 patch, Route:    No Longer        [facility] [place]



   TOP, Bedtime,    Active        Medical



   Drug form:            Center



   ERFILM, Start            



   date: 18            



   21:00:00 CDT,            



   Duration: 30            



   day, Stop date:            



   18            



   21:00:00            



   CDTNotes:            



   Remove patch 12            



   hours after            



   application            



   each day.            



               



               

 

 Oxycodone  10 mg, 2 tab,    Inactive        [facility] [place]



 Hydrochloride 5  Route: PO, Drug          2018  Medical



 MG Oral Tablet  form: TAB,            Center



   ONCE, Dosing            



   Weight 127.027,            



   kg, Start date:            



   18            



   17:01:00 CDT,            



   Stop date:            



   18            



   17:01:00            



   CDTNotes: (Same            



   as: Roxicodone)            



               



               

 

 Celebrex  200 mg, 1 cap,    No Longer         [place]



   Route: PO, Drug    Active      2018  Medical



   form: CAP, BID,            [place]



   Dosing Weight            



   127.027, kg,            



   Start date:            



   18            



   17:00:00 CDT,            



   Duration: 30            



   day, Stop date:            



   18            



   9:00:00            



   CDTNotes:            



   NSAID. Please            



   check            



   indication. Not            



   for seizure.            



   (Same As:            



   CeleBREX)            



               



               

 

 Vancomycin  1,500 mg, 250    No Longer         [place]



   mL, Route:    Active      2018  Medical



   IVPB, Drug            Center



   form: INJ,            



   [id number], Dosing            



   Weight 127.27,            



   kg, Start date:            



   18            



   16:00:00 CDT,            



   Stop date:            



   05/10/18            



   8:00:00 CDT,            



   ABX Indication:            



   Skin/Soft            



   Tissue            



   InfectionNotes:            



   TIME CRITICAL            



   MEDICATION Same            



   as: Vancocin-NS            



   (premixed)            



   Infusion rate            



   2001 mg: infuse            



   over 2.5 hours            



               



               

 

 Lidocaine  3 patch, Route:    No Longer         [place]



 Hydrochloride  TOP, Daily,    Active      2018  Medical



 0.05 MG/MG  Drug form:            [place]



 Transdermal  FILM, Start            



 Patch  date: 18            



 [Lidoderm]  9:00:00 CDT,            



   Duration: 7            



   day, Stop date:            



   18            



   9:00:00 CDT,            



   Remove after 12            



   hoursNotes:            



   Apply only once            



   for up to 12            



   hours in a            



   24-hour period            



   (12 hours on            



   and 12 hours            



   off). (Same as:            



   Lidoderm)            



   "Remove old            



   patch before            



   application of            



   new patch"            



               



               

 

 Phenergan  12.5 mg, 0.5    Inactive         [place]



   mL, Route:          2018  Medical



   IVPB, Drug            Center



   form: INJ,            



   ONCE, Dosing            



   Weight 127.027,            



   kg, Priority:            



   NOW, Start            



   date: 18            



   17:40:00 CDT,            



   Stop date:            



   18            



   17:40:00            



   CDTNotes: Do            



   not give IV            



   push.  (Same            



   as: Phenergan)            



               



               

 

 Dilaudid  0.5 mg, 0.25    Inactive       [place]



   mL, Route: IVP,            Medical



   Drug form: INJ,            Center



   ONCE, Dosing            



   Weight 127.027,            



   kg, Priority:            



   NOW, Start            



   date: 18            



   17:40:00 CDT,            



   Stop date:            



   18            



   17:40:00            



   CDTNotes: Same            



   as Dilaudid            



               



               

 

 Tramadol  100 mg, 2 tab,    No Longer        [facility]



   Route: PO, Drug    Active        [facility]



   form: TAB,            Center



   Q6Hnow, Dosing            



   Weight 127.027,            



   kg, Start date:            



   18            



   17:00:00 CDT,            



   Duration: 30            



   day, Stop date:            



   18            



   11:00:00            



   CDTNotes: Not            



   to exceed            



   400mg/day.            



   (Same As:            



   Ultram)            



               

 

 gabapentin  600 mg, 2 cap,    No Longer        [facility]



   Route: PO, Drug    Active        [facility]



   form: CAP,            Center



   Q8Hnow, Dosing            



   Weight 127.027,            



   kg, Start date:            



   18            



   17:00:00 CDT,            



   Stop date:            



   18            



   9:00:00            



   CDTNotes: (Same            



   as: Neurontin)            



               



               

 

 Acetaminophen  1,000 mg, 2    No Longer        [facility]



   tab, Route: PO,    Active        [facility]



   Drug form: TAB,            Center



   Q6Hnow, Dosing            



   Weight 127.027,            



   kg, Start date:            



   18            



   17:00:00 CDT,            



   Duration: 30            



   day, Stop date:            



   18            



   11:00:00            



   CDTNotes: Max            



   acetaminophen            



   4000 mg/day (4            



   gm/day).  (Same            



   as: Tylenol            



   Extra Strength)            



               



               

 

 Robaxin  500 mg, 1 tab,    No Longer        [facility]



   Route: PO, Drug    Active        [facility]



   form: TAB, TID,            Center



   Dosing Weight            



   127.027, kg,            



   Start date:            



   18            



   17:00:00 CDT,            



   Duration: 30            



   day, Stop date:            



   18            



   13:00:00            



   CDTNotes: (Same            



   as:Robaxin)            



               



               

 

 Oxycodone  5 mg, 1 tab,    No Longer        [facility] [place]



 Hydrochloride 5  Route: PO, Drug    Active      2018  Medical



 MG Oral Tablet  form: TAB, Q4H,            Center



   Dosing Weight            



   127.027, kg,            



   PRN Pain Score            



   4-6, Start            



   date: 18            



   16:33:00 CDT,            



   Duration: 30            



   day, Stop date:            



   18            



   16:32:00            



   CDTNotes: (Same            



   as: Roxicodone)            



               



               

 

 Beneprotein 7  2 pkt, Route:    No Longer         [place]



 gm pkt  PO, Drug Form:    Active      2018  Medical



   PWDR, Dosing            Center



   Weight 127.027,            



   kg, BID-Before            



   Meals, Start            



   date: 18            



   16:30:00 CDT,            



   Duration: 30            



   day, Stop date:            



   18            



   7:30:00            



   CDTNotes: (Same            



   as:            



   Beneprotein)            



               



               

 

 Acetaminophen  1 tab, PO, TID,    No Longer         [place]



 325 MG /  0 Refill(s)    Active      2018  Medical



 Oxycodone              Center



 Hydrochloride              



 10 MG Oral              



 Tablet              



 [Percocet              



 10/325]              



               

 

 Dilaudid  0.5 mg, 0.25    Inactive         [place]



   mL, Route: IVP,            Medical



   Drug form: INJ,            Center



   ONCE, Start            



   date: 18            



   11:01:00 CDT,            



   Stop date:            



   18            



   11:01:00 CDT            



               



               

 

 Phenergan  25 mg, 1 tab,    Inactive        [facility]



   Route: PO, Drug          2018  Medical



   form: TAB, Q6H,            Center



   Dosing Weight            



   127.027, kg,            



   PRN Nausea &            



   Vomiting, Start            



   date: 18            



   10:43:00 CDT,            



   Duration: 30            



   day, Stop date:            



   18            



   10:42:00            



   CDTNotes: (Same            



   as: Phenergan)            



               



               

 

 Dilaudid  2 mg, Route:    Inactive         [place]



   IVP, ONCE,          2018  Medical



   Dosing Weight            Center



   127.027, kg,            



   Priority: STAT,            



   Start date:            



   18            



   10:43:00 CDT,            



   Stop date:            



   18            



   10:43:00 CDT            



               



               

 

 Docusate  100 mg, 1 cap,    No Longer        [facility]



   Route: PO, Drug    Active      2018  Medical



   form: CAP, BID,            Center



   Dosing Weight            



   127.27, kg,            



   Start date:            



   18            



   9:00:00 CDT,            



   Duration: 30            



   day, Stop date:            



   18            



   17:00:00            



   CDTNotes: (Same            



   as: Colace) (Do            



   Not Crush)            



               



               

 

 Zinc Sulfate  220 mg, 1 cap,    No Longer        [facility]



   Route: PO, Drug    Active      2018  Medical



   form: CAP,            Center



   Daily, Dosing            



   Weight 127.27,            



   kg, Start date:            



   18            



   9:00:00 CDT,            



   Duration: 30            



   day, Stop date:            



   18            



   9:00:00            



   CDTNotes: (Zinc            



   sulfate            



   capsule) - 220            



   mg Zinc            



   sulfate=50 mg            



   elemental zinc            



   Same as Zinc            



   Sulfate            



               

 

 ascorbic acid  500 mg, 1 tab,    No Longer        [facility]



   Route: PO, Drug    Active        Medical



   form: TAB, BID,            Center



   Dosing Weight            



   127.27, kg,            



   Start date:            



   18            



   9:00:00 CDT,            



   Duration: 30            



   day, Stop date:            



   18            



   17:00:00            



   CDTNotes: (Same            



   as: Vitamin C)            



               



               

 

 multivitamin  1 tab, Route:    No Longer        [facility] [place]



   PO, Drug Form:    Active      2018  Medical



   TAB, Dosing            Center



   Weight 127.27,            



   kg, Daily,            



   Start date:            



   18            



   9:00:00 CDT,            



   Duration: 30            



   day, Stop date:            



   18            



   9:00:00            



   CDTNotes: (Same            



   as:Thera)            



   WASTE: F/P -            



   Black; E -            



   Municipal Trash            



   Bin  Take with            



   food.            



               

 

 Naproxen  500 mg, 1 tab,    Inactive       [place]



   Route: PO, Drug            Medical



   form: TAB,            Center



   [id number], Dosing            



   Weight 127.27,            



   kg, Start date:            



   18            



   2:00:00 CDT,            



   Duration: 30            



   day, Stop date:            



   18            



   14:00:00            



   CDTNotes: (Same            



   as: Naprosyn)            



   Take with food.            



               



               

 

 Zosyn  3.375 gm,    No Longer        [facility]



   Route: IVPB,    Active      2018  Medical



   Drug form:            Center



   PDR/INJ,            



   ABXQ8H, Dosing            



   Weight 127.27,            



   kg, Start date:            



   18            



   2:00:00 CDT,            



   Stop date:            



   05/10/18            



   10:00:00 CDT,            



   ABX Indication:            



   Skin/Soft            



   Tissue            



   InfectionNotes:            



   (Same as:            



   Zosyn) Dosing            



   based on            



   Piperacillin            



   component   ***            



   MEDICATION            



   WASTE ***            



   Product Size:            



   3375 mg Product            



   Wasted:  ___ mg            



               



               

 

 Vancomycin  1,000 mg,    No Longer         [place]



   Route: IVPB,    Active        Medical



   Drug form: INJ,            Center



   ABXQ8H, Dosing            



   Weight 127.27,            



   kg, Start date:            



   18            



   2:00:00 CDT,            



   Duration: 7            



   day, Stop date:            



   05/10/18            



   18:00:00 CDT,            



   ABX Indication:            



   Skin/Soft            



   Tissue            



   InfectionNotes:            



   TIME CRITICAL            



   MEDICATION            



   (Same As:            



   Vancocin)            



   Infusion rate            



   2001 mg: infuse            



   over 2.5 hours            



   For adult            



   patients only:            



   Round to            



   nearest 250 mg            



   per Medical            



   Staff approval            



    *** MEDICATION            



   WASTE ***            



   Product Size:            



   1000 mg Product            



   Wasted:  ___ mg            



               



               

 

 Enoxaparin  40 mg, 0.4 mL,    No Longer         [place]



   Route: SUB-Q,    Active        Medical



   Drug form: INJ,            Center



   [id number],            



   Dosing Weight            



   127.27, kg,            



   Consider for            



   obese patients,            



   Start date:            



   18            



   2:00:00 CDT,            



   Stop date:            



   18            



   14:00:00            



   CDTNotes: (Same            



   as: Lovenox)            



               



               

 

 Sodium Chloride  1,000 mL, Rate:    No Longer         [place]



 0.9% IV 1,000  125 ml/hr,    Active        Medical



 mL  Infuse over: 8            Center



   hr, Route: IV,            



   Dosing Weight            



   127.27 kg,            



   Total Volume:            



   1,000, Start            



   date: 18            



   1:46:00 CDT,            



   Duration: 30            



   day, Stop date:            



   18            



   1:45:00 CDT,            



   2.44, m2            



               

 

 Saline Flush  10 ml, Route:    No Longer         [place]



 0.9%  IVP, Drug Form:    Active        Medical



   INJ, Dosing            Center



   Weight 127.27,            



   kg, PRN, PRN            



   Line Flush,            



   Start date:            



   18            



   1:46:00 CDT,            



   Duration: 30            



   day, Stop date:            



   18            



   1:45:00            



   CDTNotes: (Same            



   as: BD            



   Posiflush)            



               



               

 

 Acetaminophen  325 mg, 1 tab,    Inactive         [place]



   Route: PO, Drug          2018  Medical



   form: TAB, Q4H,            Center



   Dosing Weight            



   127.27, kg, PRN            



   Pain Score 4-6,            



   Start date:            



   18            



   1:46:00 CDT,            



   Duration: 30            



   day, Stop date:            



   18            



   1:45:00            



   CDTNotes: Do            



   not exceed 4            



   gm/day.  (Same            



   as: Tylenol)            



               



               

 

 Acetaminophen  1 tab, Route:    Inactive         [place]



 325 MG /  PO, Drug Form:          2018  Medical



 Hydrocodone  TAB, Dosing            Center



 Bitartrate 5 MG  Weight 127.27,            



 Oral Tablet  kg, Q4H, PRN            



   Pain Score 4-6,            



   Start date:            



   18            



   1:46:00 CDT,            



   Duration: 30            



   day, Stop date:            



   18            



   1:45:00            



   CDTNotes: (Same            



   as: Norco            



   325/5)  Do not            



   exceed 4gm/day            



   of            



   acetaminophen.            



               



               

 

 Reglan  10 mg, 2 mL,    Inactive         [place]



   Route: IVP,            Medical



   Drug form: INJ,            Center



   ONCE, Dosing            



   Weight 127.273,            



   kg, Priority:            



   STAT, Start            



   date: 18            



   23:27:00 CDT,            



   Stop date:            



   18            



   23:27:00            



   CDTNotes: (Same            



   as: Reglan)            



               



               

 

 Benadryl  25 mg, 0.5 mL,    Inactive         [place]



   Route: IVP,          2018  Medical



   Drug form: INJ,            Center



   ONCE, Dosing            



   Weight 127.273,            



   kg, Priority:            



   STAT, Start            



   date: 18            



   23:27:00 CDT,            



   Stop date:            



   18            



   23:27:00            



   CDTNotes: (Same            



   as: Benadryl)            



               



               

 

 Magnesium  2 gm, 50 mL,    Inactive        [facility]



 Sulfate  Route: IV, Drug            Medical



   form: INJ,            Center



   ONCE, Dosing            



   Weight 127.273,            



   kg, Priority:            



   STAT, Start            



   date: 18            



   23:26:00 CDT,            



   Stop date:            



   18            



   23:26:00            



   CDTNotes:            



   WASTE: F/P -            



   Sink; E -            



   Municipal Trash            



   Bin            



               

 

 Sodium Chloride  1,000 mL, 1000    Inactive        [facility] [place]



 0.9% (Bolus) IV  ml/hr, Infuse          2018  Medical



   Over: 1 hr,            Center



   Route: IV,            



   1,000, Drug            



   form: INJ,            



   ONCE, Priority:            



   STAT, Dosing            



   Weight 127.273            



   kg, Start date:            



   18            



   23:26:00 CDT,            



   Stop date:            



   18            



   23:26:00 CDT            



               



               

 

 Zosyn  4.5 gm, Route:    Inactive         [place]



   IVPB, ONCE,          2018  Medical



   Dosing Weight            Center



   127.273, kg,            



   Priority: STAT,            



   Start date:            



   18            



   23:10:00 CDT,            



   Stop date:            



   18            



   23:10:00 CDT,            



   ABX Indication:            



   Bacteremia            



               



               

 

 Vancomycin  2,000 mg,    Inactive         [place]



   Route: IVPB,          2018  Medical



   ONCE, Dosing            Center



   Weight 127.273,            



   kg, Priority:            



   STAT, Start            



   date: 18            



   23:10:00 CDT,            



   Stop date:            



   18            



   23:10:00 CDT,            



   ABX Indication:            



   BacteremiaNotes            



   : TIME CRITICAL            



   MEDICATION            



   (Same As:            



   Vancocin)            



   Infusion rate            



   2001 mg: infuse            



   over 2.5 hours            



   For adult            



   patients only:            



   Round to            



   nearest 250 mg            



   per Medical            



   Staff approval            



    *** MEDICATION            



   WASTE ***            



   Product Size:            



   1000 mg Product            



   Wasted:  ___ mg            



               



               

 

 normal saline  1,000 mL, Rate:    No Longer       [place]



 0.9% IV 1,000  75 ml/hr,    Active      2018  Medical



 mL  Infuse over:            Center



   13.3 hr, Route:            



   IV, Dosing            



   Weight 127.273            



   kg, Total            



   Volume: 1,000,            



   Start date:            



   18            



   22:39:00 CDT,            



   Duration: 30            



   day, Stop date:            



   18            



   22:38:00 CDT,            



   2.36, m2            



               

 

 Acetaminophen  1,000 mg, 2    Inactive         [place]



   tab, Route: PO,          2018  Medical



   Drug form: TAB,            Center



   ONCE, Dosing            



   Weight 127.273,            



   kg, Start date:            



   18            



   21:56:00 CDT,            



   Stop date:            



   18            



   21:56:00            



   CDTNotes: Max            



   acetaminophen            



   4000 mg/day (4            



   gm/day).  (Same            



   as: Tylenol            



   Extra Strength)            



               



               

 

 Rocephin  1 gm, Route:    Inactive      05/04[facility]



   IVP, Drug form:          2018  Medical



   PDR/INJ, ONCE,            Center



   Dosing Weight            



   127.273, kg,            



   Priority: STAT,            



   Start date:            



   18            



   21:21:00 CDT,            



   Stop date:            



   18            



   21:21:00 CDT,            



   ABX Indication:            



   Urinary Tract            



   InfectionNotes:            



   (Same As:            



   Rocephin).            



   *** MEDICATION            



   WASTE ***            



   Product Size:            



   1000 mg Product            



   Wasted:  _0__            



   mg            



               

 

 Morphine  4 mg, Route:    Inactive      [facility]



   IVP, ONCE,          2018  Medical



   Dosing Weight            Center



   127.273, kg,            



   Priority: STAT,            



   Start date:            



   18            



   19:05:00 CDT,            



   Stop date:            



   18            



   19:05:00 CDT            



               



               

 

 Benadryl  25 mg, 0.5 mL,    Inactive      [facility]



   Route: IVP,            Medical



   Drug form: INJ,            Center



   ONCE, Dosing            



   Weight 127.273,            



   kg, Priority:            



   STAT, Start            



   date: 18            



   18:14:00 CDT,            



   Stop date:            



   18            



   18:14:00            



   CDTNotes: (Same            



   as: Benadryl)            



               



               

 

 Benadryl  50 mg, 2 cap,    Inactive      [facility]



   Route: PO, Drug            Medical



   form: CAP,            Center



   ONCE, Dosing            



   Weight 127.273,            



   kg, Priority:            



   STAT, Start            



   date: 18            



   17:56:00 CDT,            



   Stop date:            



   18            



   17:56:00            



   CDTNotes: (Same            



   as: Benadryl)            



               



               

 

 Morphine  4 mg, 1 mL,    Inactive      [facility]



   Route: IVP,            Medical



   Drug form:            Center



   SOLN, ONCE,            



   Dosing Weight            



   127.273, kg,            



   Priority: STAT,            



   Start date:            



   18            



   17:56:00 CDT,            



   Stop date:            



   18            



   17:56:00 CDT            



               



               







Allergies, Adverse Reactions, Alerts







 Substance  Category  Reaction  Severity  Reaction  Status  Date  Comments  
Source



         type    Reported    



                 



                 



                 

 

 amoxicillin  Assertion      Drug  Active      [facility]



                 



                 

 

 morphine  Assertion      Drug  Active      [facility]



                 



                 

 

 Toradol  Assertion      Drug  Active      [facility]



                 



                 

 

 Minocin  Assertion      Drug  Active      [facility]



                 



                 

 

 Zofran  Assertion      Drug  Active      [facility]



                 



                 

 

 Levaquin  Assertion      Drug  Active      [facility]



                 



                 

 

 Bactrim  Assertion      Drug  Active      [facility]



                 



                 

 

 NKDA  Assertion      Drug  Active      [facility]



                 



                 







Immunizations







 Immunization  Date Given  Site  Status  Last Updated  Comments  Source



             



             







Results







 Order Name  Results  Value  Reference  Date  Interpretation  Comments  Source



       [facility]        



               



               



               

 

 CHEM PANEL  B/C Ratio  17  6 - 25        [address]



               



               



               

 

 CHEM PANEL  Globulin  4.3 g/dL  2.7 - 4.2        [address]



               



               



               

 

 CHEM PANEL  A/G Ratio  0.7  0.7 - 1.6        [address]



               



               



               

 

 CHEM PANEL  AGAP  14.4 meq/L  10.0 -        [facility]



       20.0        [facility]



               



               



               

 

 CHEM PANEL  eGFR  113        Result Comment: The eGFR is calculated using 
the CKD-EPI formula. In most young, healthy individuals the eGFR will be >90 mL/
min/1.73m2. The eGFR declines with age. An eGFR of 60-89 may be normal in  [facility] 
[place]



     mL/min/1.7        some populations, particularly the elderly, for 
whom the CKD-EPI formula has not been extensively validated. Use of the eGFR is 
not recommended in the following populations:  [address]



             Individuals with unstable creatinine concentrations, including 
pregnant patients and those with serious co-morbid conditions.  



               



             Patients with extremes in muscle mass or diet.  



               



             The data above are obtained from the National Kidney Disease 
Education Program (NKDEP) which additionally recommends that when the eGFR is 
used in patients with extremes of body mass index for purposes  



             of drug dosing, the eGFR should be multiplied by the estimated 
BMI.  

 

 CHEM PANEL  Alk Phos  76 unit/L  39 - 136        [address]



               



               



               

 

 CHEM PANEL  ALT  35 unit/L  0 - 65        [address]



               



               



               

 

 CHEM PANEL  Albumin Lvl  2.8 g/dL  3.5 - 5.0        [address]



               



               



               

 

 CHEM PANEL  Total Protein  7.1 g/dL  6.4 - 8.4        [address]



               



               



               

 

 CHEM PANEL  Calcium Lvl  8.7 mg/dL  8.5 - 10.5        [address]



               



               



               

 

 CHEM PANEL  AST  18 unit/L  0 - 37        [address]



               



               



               

 

 CHEM PANEL  Bili Total  0.3 mg/dL  0.2 - 1.3        [address]



               



               



               

 

 CHEM PANEL  Potassium Lvl  4.4 meq/L  3.5 - 5.1         [place]



         /[address] [place] [address]



               



               



               

 

 CHEM PANEL  Chloride Lvl  109 meq/L  95 - 109  05       [place]



               [facility]



               



               



               

 

 CHEM PANEL  CO2  23 meq/L  24 - 32         [place]



         [address]



               



               



               

 

 CHEM PANEL  Glucose Lvl  114 mg/dL  70 - 99        [address]



               



               



               

 

 CHEM PANEL  Creatinine  1.01 mg/dL  0.50 -         [place]



   Lvl    1.40  /      [facility]



               



               



               

 

 CHEM PANEL  BUN  17 mg/dL  7 - 22         [place]



         [address]



               



               



               

 

 CHEM PANEL  Sodium Lvl  142 meq/L  135 - 145  05      [address]



               



               



               

 

 HEMATOLOGY  Basophils  0.6 %  0.0 - 1.0        [address]



               



               



               

 

 HEMATOLOGY  Segs-Bands #  4.8 K/CMM  1.5 - 8.1        [address]



               



               



               

 

 HEMATOLOGY  Monocytes #  0.7 K/CMM  0.0 - 0.8        [facility]



         [address] [place] [address]



               



               



               

 

 HEMATOLOGY  Lymphocytes #  1.9 K/CMM  1.0 - 5.5        [address]



               



               



               

 

 HEMATOLOGY  Monocytes  9.4 %  2.0 - 12.0        [address]



               



               



               

 

 HEMATOLOGY  Eosinophils #  0.2 K/CMM  0.0 - 0.5        [address]



               



               



               

 

 HEMATOLOGY  Eosinophils  2.9 %  0.0 - 4.0        [address]



               



               



               

 

 HEMATOLOGY  Segs  62.2 %  45.0 -         [place]



       75.0        [facility]



               



               



               

 

 HEMATOLOGY  Lymphocytes  24.9 %  20.0 -         [place]



       40.0        [facility]



               



               



               

 

 HEMATOLOGY  MCH  27.5 pg  27.0 -         [place]



       31.0        [facility]



               



               



               

 

 HEMATOLOGY  MCV  85.3 fL  80.0 -         [place]



       94.0        [facility]



               



               



               

 

 HEMATOLOGY  Hct  43.9 %  42.0 -         [place]



       54.0        [facility]



               



               



               

 

 HEMATOLOGY  Hgb  14.2 g/dL  14.0 -         [place]



       18.0        [facility]



               



               



               

 

 HEMATOLOGY  WBC  7.7 K/CMM  3.7 - 10.4        [address]



               



               



               

 

 HEMATOLOGY  RBC  5.15 M/CMM  4.70 -        [facility] [place]



       6.10        [facility]



               



               



               

 

 HEMATOLOGY  MPV  8.4 fL  7.4 - 10.4         [place]



         /2018      [facility]



               



               



               

 

 HEMATOLOGY  MCHC  32.3 g/dL  32.0 -        [facility]



       36.0        [facility]



               



               



               

 

 HEMATOLOGY  RDW  17.3 %  11.5 -        [facility]



       14.5        [facility]



               



               



               

 

 HEMATOLOGY  Platelet  317 K/CMM  133 - 450         [place]



               [facility]



               



               



               

 

 CHEM PANEL  Globulin  4.4 g/dL  2.7 - 4.2         [place]



         [address]



               



               



               

 

 CHEM PANEL  A/G Ratio  0.6  0.7 - 1.6         [place]



         [address]



               



               



               

 

 CHEM PANEL  B/C Ratio  17  6 - 25         [place]



               [facility]



               



               



               

 

 CHEM PANEL  AGAP  11.3 meq/L  10.0 -        [facility]



       20.0        [facility]



               



               



               

 

 CHEM PANEL  eGFR  134        Result Comment: The eGFR is calculated using 
the CKD-EPI formula. In most young, healthy individuals the eGFR will be >90 mL/
min/1.73m2. The eGFR declines with age. An eGFR of 60-89 may be normal in  [facility] 
[place]



     mL/min/1.    some populations, particularly the elderly, for 
whom the CKD-EPI formula has not been extensively validated. Use of the eGFR is 
not recommended in the following populations:  [address]



             Individuals with unstable creatinine concentrations, including 
pregnant patients and those with serious co-morbid conditions.  



               



             Patients with extremes in muscle mass or diet.  



               



             The data above are obtained from the National Kidney Disease 
Education Program (NKDEP) which additionally recommends that when the eGFR is 
used in patients with extremes of body mass index for purposes  



             of drug dosing, the eGFR should be multiplied by the estimated 
BMI.  

 

 CHEM PANEL  Creatinine  0.84 mg/dL  0.50 -        [facility]



   Lvl    1.40        [facility]



               



               



               

 

 CHEM PANEL  Sodium Lvl  142 meq/L  135 - 145         [place]



         [address]



               



               



               

 

 CHEM PANEL  Glucose Lvl  99 mg/dL  70 - 99         [place]



         [address]



               



               



               

 

 CHEM PANEL  BUN  14 mg/dL  7 - 22        [address]



               



               



               

 

 CHEM PANEL  Alk Phos  79 unit/L  39 - 136         [place]



         [address]



               



               



               

 

 CHEM PANEL  Bili Total  0.3 mg/dL  0.2 - 1.3        [address]



               



               



               

 

 CHEM PANEL  AST  14 unit/L  0 - 37  05      [address]



               



               



               

 

 CHEM PANEL  ALT  43 unit/L  0 - 65        [address]



               



               



               

 

 CHEM PANEL  Total Protein  7.1 g/dL  6.4 - 8.4        [address]



               



               



               

 

 CHEM PANEL  Albumin Lvl  2.7 g/dL  3.5 - 5.0        [address]



               



               



               

 

 CHEM PANEL  Calcium Lvl  9.2 mg/dL  8.5 - 10.5        [address]



               



               



               

 

 CHEM PANEL  CO2  21 meq/L  24 - 32        [address]



               



               



               

 

 CHEM PANEL  Potassium Lvl  4.3 meq/L  3.5 - 5.1        [address]



               



               



               

 

 CHEM PANEL  Chloride Lvl  114 meq/L  95 - 109        [address]



               



               



               

 

 HEMATOLOGY  MCHC  32.5 g/dL  32.0 -        [facility]



       36.0        [facility]



               



               



               

 

 HEMATOLOGY  RDW  17.5 %  11.5 -        [facility]



       14.5        [facility]



               



               



               

 

 HEMATOLOGY  Platelet  400 K/CMM  133 - 450        [address]



               



               



               

 

 HEMATOLOGY  MPV  8.5 fL  7.4 - 10.4        [address]



               



               



               

 

 HEMATOLOGY  WBC  6.6 K/CMM  3.7 - 10.4        [address]



               



               



               

 

 HEMATOLOGY  RBC  5.17 M/CMM  4.70 -        [facility]



       6.10        [facility]



               



               



               

 

 HEMATOLOGY  MCV  86.1 fL  80.0 -         [place]



       94.0        [facility]



               



               



               

 

 HEMATOLOGY  Hct  44.5 %  42.0 -         [place]



       54.0        [facility]



               



               



               

 

 HEMATOLOGY  MCH  28.0 pg  27.0 -        [facility]



       31.0        [facility]



               



               



               

 

 HEMATOLOGY  Hgb  14.5 g/dL  14.0 -        [facility]



       18.0        [facility]



               



               



               

 

 HEMATOLOGY  Lymphocytes #  1.7 K/CMM  1.0 - 5.5        [address]



               



               



               

 

 HEMATOLOGY  Monocytes #  0.7 K/CMM  0.0 - 0.8        [address]



               



               



               

 

 HEMATOLOGY  Eosinophils #  0.2 K/CMM  0.0 - 0.5        [address]



               



               



               

 

 HEMATOLOGY  Lymphocytes  26.1 %  20.0 -         [place]



       40.0        [facility]



               



               



               

 

 HEMATOLOGY  Segs  59.3 %  45.0 -        [facility]



       75.0        [facility]



               



               



               

 

 HEMATOLOGY  Basophils  0.8 %  0.0 - 1.0        [address]



               



               



               

 

 HEMATOLOGY  Monocytes  10.5 %  2.0 - 12.0        [address]



               



               



               

 

 HEMATOLOGY  Eosinophils  3.3 %  0.0 - 4.0        [address]



               



               



               

 

 HEMATOLOGY  Segs-Bands #  3.9 K/CMM  1.5 - 8.1        [address]



               



               



               

 

 CHEM PANEL  Glucose Lvl  74 mg/dL  70 - 99        [address]



               



               



               

 

 CHEM PANEL  BUN  12 mg/dL  7 - 22        [address]



               



               



               

 

 CHEM PANEL  Creatinine  0.75 mg/dL  0.50 -        [facility]



   Lvl    1.40        [facility]



               



               



               

 

 CHEM PANEL  eGFR  140        Result Comment: The eGFR is calculated using 
the CKD-EPI formula. In most young, healthy individuals the eGFR will be >90 mL/
min/1.73m2. The eGFR declines with age. An eGFR of 60-89 may be normal in  [facility] 
[place]



     mL/min/1.7        some populations, particularly the elderly, for 
whom the CKD-EPI formula has not been extensively validated. Use of the eGFR is 
not recommended in the following populations:  [address]



             Individuals with unstable creatinine concentrations, including 
pregnant patients and those with serious co-morbid conditions.  



               



             Patients with extremes in muscle mass or diet.  



               



             The data above are obtained from the National Kidney Disease 
Education Program (NKDEP) which additionally recommends that when the eGFR is 
used in patients with extremes of body mass index for purposes  



             of drug dosing, the eGFR should be multiplied by the estimated 
BMI.  

 

 CHEM PANEL  Albumin Lvl  2.9 g/dL  3.5 - 5.0        [facility]



         [address] [place] [address]



               



               



               

 

 CHEM PANEL  Globulin  4.4 g/dL  2.7 - 4.2        [address]



               



               



               

 

 CHEM PANEL  A/G Ratio  0.7  0.7 - 1.6        [address]



               



               



               

 

 CHEM PANEL  Bili Total  0.4 mg/dL  0.2 - 1.3        [address]



               



               



               

 

 CHEM PANEL  Alk Phos  71 unit/L  39 - 136  05/       [place]



         /[address] [place] [address]



               



               



               

 

 CHEM PANEL  AST  15 unit/L  0 - 37  05      [address]



               



               



               

 

 CHEM PANEL  ALT  28 unit/L  0 - 65        [address]



               



               



               

 

 CHEM PANEL  Potassium Lvl  4.4 meq/L  3.5 - 5.1        [address]



               



               



               

 

 CHEM PANEL  Sodium Lvl  147 meq/L  135 - 145  05      [address]



               



               



               

 

 CHEM PANEL  CO2  25 meq/L  24 - 32  05      [address]



               



               



               

 

 CHEM PANEL  Chloride Lvl  114 meq/L  95 - 109        [address]



               



               



               

 

 CHEM PANEL  B/C Ratio  16  6 - 25        [address]



               



               



               

 

 CHEM PANEL  Calcium Lvl  8.7 mg/dL  8.5 - 10.5        [address]



               



               



               

 

 CHEM PANEL  AGAP  12.4 meq/L  10.0 -         [place]



       20.0        [facility]



               



               



               

 

 CHEM PANEL  Total Protein  7.3 g/dL  6.4 - 8.4        [facility]



         [address] [place] [address]



               



               



               

 

 HEMATOLOGY  Platelet  345 K/CMM  133 - 450  05      [facility]



         [address] [place] [address]



               



               



               

 

 HEMATOLOGY  MPV  8.7 fL  7.4 - 10.4        [address]



               



               



               

 

 HEMATOLOGY  WBC  6.9 K/CMM  3.7 - 10.4        [address]



               



               



               

 

 HEMATOLOGY  RBC  5.30 M/CMM  4.70 -         [place]



       6.10        [facility]



               



               



               

 

 HEMATOLOGY  MCHC  32.8 g/dL  32.0 -         [place]



       36.0        [facility]



               



               



               

 

 HEMATOLOGY  MCH  27.9 pg  27.0 -         [place]



       31.0        [facility]



               



               



               

 

 HEMATOLOGY  Hgb  14.8 g/dL  14.0 -  05       [place]



       18.0        [facility]



               



               



               

 

 HEMATOLOGY  MCV  85.0 fL  80.0 -         [place]



       94.0        [facility]



               



               



               

 

 HEMATOLOGY  Hct  45.1 %  42.0 -        [facility]



       54.0        [facility]



               



               



               

 

 HEMATOLOGY  RDW  17.5 %  11.5 -  05       [place]



       14.5        [facility]



               



               



               

 

 HEMATOLOGY  Eosinophils #  0.2 K/CMM  0.0 - 0.5        [address]



               



               



               

 

 HEMATOLOGY  Lymphocytes #  2.0 K/CMM  1.0 - 5.5  05/      [address]



               



               



               

 

 HEMATOLOGY  Monocytes #  0.7 K/CMM  0.0 - 0.8  05      [address]



               



               



               

 

 HEMATOLOGY  Eosinophils  2.4 %  0.0 - 4.0  05      [address]



               



               



               

 

 HEMATOLOGY  Basophils  0.6 %  0.0 - 1.0        [address]



               



               



               

 

 HEMATOLOGY  Segs-Bands #  4.0 K/CMM  1.5 - 8.1        [address]



               



               



               

 

 HEMATOLOGY  Monocytes  10.4 %  2.0 - 12.0        [address]



               



               



               

 

 HEMATOLOGY  RBC Morph  Normal          [address]



     (18 2:07 AM)          [place]



               



               



               

 

 HEMATOLOGY  Segs  58.1 %  45.0 -  05      [facility]



       75.0        [facility]



               



               



               

 

 HEMATOLOGY  Plt Morph  Normal          [address]



     (18 2:07 AM)          [place]



               



               



               

 

 HEMATOLOGY  Lymphocytes  28.5 %  20.0 -        [facility]



       40.0        [facility]



               



               



               

 

 HEMATOLOGY  Basophils #  0.1 K/CMM  0.0 - 0.2  05      [address]



               



               



               

 

 HEMATOLOGY  Polychrom  Moderate  None Seen        [facility]



         2018      Medical



     *ABN*          Center



               



     (18 11:07 AM)          



               

 

 CHEM PANEL  Magnesium Lvl  2.3 mg/dL  1.8 - 2.4        [address]



               



               



               

 

 CHEM PANEL  Phosphorus  3.5 mg/dL  2.5 - 4.5        [address]



               



               



               

 

 HEMATOLOGY  PT  14.0 s  12.0 -        [facility]



       14.7        [facility]



               



               



               

 

 HEMATOLOGY  PTT  37.6 s  22.9 -        [facility]



       35.8        [facility]



               



               



               

 

 HEMATOLOGY  INR  1.08  0.85 -        [facility]



       1.17        [facility]



               



               



               

 

 IMMUNOLOGY  C-REACTIVE  13.1 mg/L  <=2.9 mg/L        [facility]



   PROTEIN      [address] [place] [address]



               



               



               

 

 IMMUNOLOGY  Prealbumin  25.2 mg/dL  18.0 -  05/04       [place]



       45.0        [facility]



               



               



               

 

 CHEM PANEL  Lactic Acid  0.9 mMol/L  0.5 - 2.2        [facility]



   Lvl            [facility]



               



               



               

 

 HEMATOLOGY  Sed Rate  5 mm/h  0 - 15        [facility] [place]



         /2018      [facility]



               



               



               

 

 IMMUNOLOGY  C-REACTIVE  15.8 mg/L  <=2.9 mg/L        [facility]



   PROTEIN            [facility]



               



               



               

 

 HEMATOLOGY  PT  13.0 s  12.0 -         [place]



       14.7        [facility]



               



               



               

 

 HEMATOLOGY  INR  0.98  0.85 -         [place]



       1.17        [facility]



               



               



               

 

 HEMATOLOGY  PTT  33.2 s  22.9 -         [place]



       35.8        [facility]



               



               



               

 

 BLOOD BANK  Antibody Scrn  Negative          [facility]



 RESULTS              [facility]



     (5/3/18 6:51 PM)          [place]



               



               



               

 

 BLOOD BANK  ABO/Rh  AB POS          [facility]



 RESULTS              [facility]



               



               



               

 

 URINE AND  UA  0.2 EU/dL  0.1 - 1.0        [facility]  Urobilinogen      /      [facility]



               



               



               

 

 URINE AND  UA Nitrite  Negative  Negative        [facility]              [facility]



     (5/3/18 6:35 PM)          [place]



               



               



               

 

 URINE AND  UA Glucose  Negative  Negative        [facility]      [facility]



     (5/3/18 6:35 PM)          [place]



               



               



               

 

 URINE AND  UA Ketones  Negative  Negative        [facility]              Medical



     *NA*          [place]



               



     (5/3/18 6:35 PM)          



               

 

 URINE AND  UA Bili  Negative  Negative        [facility]              Medical



     *NA*          [place]



               



     (5/3/18 6:35 PM)          



               

 

 URINE AND  UA Blood  Trace  Negative        [facility]



 STOOL        /2018      Medical



     *ABN*          [place]



               



     (5/3/18 6:35 PM)          



               

 

 URINE AND  UA Leuk Est  Small  Negative        [facility]              Medical



     *ABN*          [place]



               



     (5/3/18 6:35 PM)          



               

 

 URINE AND  UA Spec Grav  1.020  <=1.030        [facility]



 STOOL        [address]



               



               



               

 

 URINE AND  UA pH  6.0  5.0 - 8.0        [facility]



 STOOL        [address]



               



               



               

 

 URINE AND  UA Color  Yellow  Yellow        [facility]              Medical



     *NA*          [place]



               



     (5/3/18 6:35 PM)          



               

 

 URINE AND  UA Protein  Trace  Negative        [facility]              Medical



     *ABN*          [place]



               



     (5/3/18 6:35 PM)          



               

 

 URINE AND  UA Turbidity  Slight Cloudy  Clear        [facility]      [facility]



     (5/3/18 6:35 PM)          [place]



               



               



               

 

 URINE AND  UA Hyal Cast  0-2  0 - 2        [facility]        [address] [place] [address]



     (5/3/18 6:35 PM)          [place]



               



               



               

 

 URINE AND  UA Bacteria  Occasional  None Seen        [facility]



 STOOL    /HPF  /HPF  /      [facility]



               



               



               

 

 URINE AND  UA RBC  11-20 /HPF  0 - 2        [address]



               



               



               

 

 URINE AND  UA Sq Epi  Occasional  Few /LPF        [facility]



 STOOL    /LPF    /[address] [place] [address]



               



               



               

 

 URINE AND  UA WBC    None Seen        [facility]



 STOOL    /HPF  /HPF  /      [facility]



               



               



               

 

 HEMATOLOGY  Basophils #  0.1 K/CMM  0.0 - 0.2        [address]



               



               



               

 

 HEMATOLOGY  Polychrom  Slight          [address]



               



               



               

 

 HEMATOLOGY  Plt Morph  Normal          [address]



     (5/3/18 6:20 PM)          [place]



               



               



               

 

 Brain shunt  Brain shunt  EXAM: SKULL 2 VIEWS        -  [facility] [place]



 series DX  series DX          -  Medical



     EXAM: CHEST 2 VIEWS        This report was dictated by a Radiology Resident
/Fellow.  I have personally reviewed the images as  Center



             well as the Resident's interpretation and agree with the findings.
  



     EXAM: ABDOMEN 2 VIEWS        Read by:  Bernardo Lorenz MD        
  Resident:  Bernardo Lorenz MD  



             Dictated Date/time:  18 20:33  



             Electronically Signed by:  Martin Phillips MD                      
   18 22:11  



             FINAL REPORT  



     DATE: 5/3/2018 7:20 PM CDT          



               



               



               



     INDICATION: Headache. - Please include Codman view for shunt setting.     
     



               



               



               



     COMPARISON: Brain shuntogram 2013          



               



               



               



     TECHNIQUE: AP and lateral views of the skull, chest and abdomen.          



               



               



               



     FINDINGS:          



               



     There is a single intact shunt tube with the proximal aspect in the right 
frontal calvarium coursing across midline to the left anterior chest and 
abdomen with the tip coiled within the pelvis.          



               



               



               



     A right parietal shunt with distal tip in the right lateral abdomen is 
discontinuous. Another shunt present with proximal tip in the right neck base 
and distal tip in the medial mid abdomen          



               



     Cholecystectomy clips are noted. The lungs are clear but underinflated. 
Appearance of the pelvis is unchanged with bilateral shallow acetabular roofs. 
No obvious posterior dislocation. Spinal dysraphism          



      is seen with absence of the spinous process from L3 to S1.          



               



               



               



     IMPRESSION:          



               



               



               



     1. No significant change. The right transfrontal shunt catheter is intact 
along its course with the distal tip in the pelvis.          



               



     2. Discontinuous right parieto-occipital catheter and 2 discontinuous 
catheters in the right chest and abdomen are unchanged.          



               



     3. Spinal dysraphism.          



               



               



               



               

 

 Brain wo  Brain wo  EXAM: CT BRAIN WITHOUT CONTRAST        [facility]



 contrast CT  contrast CT      /    -  Medical



             This report was dictated by a Radiology Resident/Fellow.  I have 
personally reviewed the images as  Center



             well as the Resident's interpretation and agree with the findings.
  



     DATE: 5/3/2018 627 PM CDT        Read by:  Bernardo Lorenz MD    
      Resident:  Bernardo Lorenz MD  



             Dictated Date/time:  18 18:45  



             Electronically Signed by:  Martin Phillips MD                      
   18 21:12  



             FINAL REPORT  



     INDICATION: 32-year-old male patient with history of  shunt malfunction 
         



               



               



               



     COMPARISON: CT of the brain 2015, 2013.          



               



               



               



     TECHNIQUE: Axial CT images of the brain were obtained. Sagittal and 
coronal reformats.          



               



     IV contrast: None.          



               



               



               



     FINDINGS:          



               



     An orphaned right occipital approach  shunt is present. Also present is 
a right frontal approach  shunt with tip in the left lateral ventricle.      
    



               



     Narrowing of the lateral ventricles is present, unchanged from 2015. 
         



               



     There is mild uncal and cerebellar tonsillar herniation, also unchanged.  
        



               



     No recent hemorrhage or territorial infarct. No mass. No midline shift.   
       



               



     No scalp fluid collections.          



               



     Sinuses are clear.          



               



               



               



     IMPRESSION:          



               



     No acute intracranial abnormality.          



               



     Adequately decompressed ventricular system.          



               



               



               



               

 

 Chest 1view  Chest 1view  EXAM: XR CHEST 1 VIEW        [facility]



 DX  DX      /    -  Medical



             This report was dictated by a Radiology Resident/Fellow.  I have 
personally reviewed the images as  Center



             well as the Resident's interpretation and agree with the findings.
  



     DATE: 5/3/2018 6:12 PM CDT        Read by:  Olvin Palacio MD          
       Resident:  Olvin Palacio MD  



             Dictated Date/time:  18 18:31  



             Electronically Signed by:  Bakari Interiano MD              
   18 19:29  



             FINAL REPORT  



     INDICATION:  - picc line use          



               



               



               



     UT SECTION: ER          



               



               



               



     COMPARISON: None.          



               



               



               



     TECHNIQUE: AP chest          



               



               



               



     FINDINGS:          



               



     Lines, tubes and hardware:          



               



     Left PICC tip at mid SVC.          



               



               



               



     Lungs and pleura: No pulmonary or pleural based abnormality is identified. 
Pulmonary vascularity is normal.          



               



               



               



     Heart and mediastinum: The heart size is normal for technique. The 
mediastinal contours are normal.          



               



               



               



     Bones: No acute bony abnormality is identified.          



               



               



               



     Upper abdomen: Normal.          



               



               



               



               



               



     IMPRESSION:          



               



     Left PICC tip at mid SVC.          



               



               



               



     No acute cardiopulmonary abnormality is observed.          



               



               



               



               







Vital Signs







 Vital Sign  Value  Date  Comments  Source



         

 

 Temperature Oral (F)  97.2 F  2018    [facility]



         

 

 Systolic (mm Hg)  127  2018    [facility]



         

 

 Diastolic (mm Hg)  85  2018    [facility]



         

 

 Heart Rate  81  2018    [facility]



         

 

 Respitory Rate  18  2018    [facility]



         

 

 Heart Rate  90  2018    [facility]



         

 

 Systolic (mm Hg)  106  2018    [facility]



         

 

 Diastolic (mm Hg)  71  2018    [facility]



         

 

 Respitory Rate  18  2018    [facility]



         

 

 Temperature Oral (F)  98.1 F  2018    [facility]



         

 

 Respitory Rate  18  2018    [facility]



         

 

 Systolic (mm Hg)  168  2018    [facility]



         

 

 Diastolic (mm Hg)  107  2018    [facility]



         

 

 Heart Rate  87  2018    [facility]



         

 

 Temperature Oral (F)  98.1 F  2018    [facility]



         

 

 Weight  127.027  2018    [facility]



         

 

 Weight  127.027  2018    [facility]



         

 

 Weight  127.027  2018    [facility]



         

 

 BMI Calculated  48.16  2018    [facility]



         

 

 Height  162.56 cm  2018    [facility]



         

 

 Height  149.86 cm  2018    [facility]



         

 

 BMI Calculated  56.67  2018    [facility]



         







Encounters







 Location  Location  Encounter  Encounter  Reason  Attending  ADM  DC  Status  
Source



   Details  Type  Number  For  Provider  Date  Date    



         Visit          



                   



                   



                   

 

 [facility]  [id number]    [place]      [facility]



 Jose Ulloah      [facility]



                   



                   



                   







Procedures







 Procedure  Code  Date  Perfomer  Comments  Source



           



           

 

 Cholecystectomy  05034070        [facility]



           

 

 Shunt of cerebral  [id number]        [facility] [place]



 ventricle to          Medical



 extracranial site          Center

## 2018-09-18 NOTE — XMS REPORT
[facility]

 Created on:2018



Patient:Redd Dale

Sex:Male

:1985

External Reference #:[id number]





Demographics







 Address  [address]  [place] [address]

 

 Phone  [phone]

 

 Preferred Language  en

 

 Marital Status  Unknown

 

 Sikhism Affiliation  Unknown

 

 Race  Black or 

 

 Ethnic Group  Not  or 









Author







 Organization  [employer]









Care Team Providers







 Name  Role  Phone

 

 Knox, Na  Provider Role  Unavailable









Allergies, Adverse Reactions, Alerts







 Substance  Reaction  Event Type

 

 Toradol  Info Not Available  Drug Allergy

 

 Sulfa  Info Not Available  Drug Allergy

 

 Zofran  Info Not Available  Drug Allergy

 

 Morphine Sulfate ER  Info Not Available  Drug Allergy

 

 Levaquin  Info Not Available  Drug Allergy







Problems







 Problem Type  Condition  Code  Onset Dates  Condition Status

 

 Problem  Nicotine dependence  F17.200    Active

 

 Problem  Lumbar spina bifida with  Q05.2    Active



   hydrocephalus      

 

 Problem  Dependence on wheelchair  Z99.3    Active

 

 Problem  Fecal incontinence  R15.9    Active

 

 Problem  Foot ulcer  L97.509    Active

 

 Problem  Depression with anxiety  F41.8    Active

 

 Problem  Paraplegia  G82.20    Active

 

 Problem  Constipation  K59.00    Active

 

 Problem  Incontinence of urine  R32    Active

 

 Problem  Nausea alone  R11.0    Active

 

 Assessment  Episodic tension-type headache, not  G44.219    Active



   intractable      

 

 Assessment   (ventriculoperitoneal) shunt  Z98.2    Active



   status      

 

 Assessment  Ulcer of left foot, unspecified  L97.529    Active



   ulcer stage      

 

 Assessment  Nonintractable episodic headache,  R51    Active



   unspecified headache type      

 

 Assessment  Depression with anxiety  F41.8    Active

 

 Problem  Episodic tension-type headache, not  G44.219    Active



   intractable      

 

 Assessment  Lumbar spina bifida with  Q05.2    Active



   hydrocephalus      

 

 Problem  Nonintractable episodic headache,  R51    Active



   unspecified headache type      

 

 Assessment  Nausea and vomiting, intractability  R11.2    Active



   of vomiting not specified,      



   unspecified vomiting type      

 

 Problem   (ventriculoperitoneal) shunt  Z98.2    Active



   status      







Medications







 Medication  Code  Code  Instructions  Start  End  Status  Dosage



   System      Date  Date    

 

 Tylenol with  NDC  26522670660  300-60 MG      Active  1 tablet as



 Codeine #4      Orally every 6        needed



       hrs        

 

 Macrobid  NDC  52032459583  100 MG Orally      Active  1 capsule



       every 12 hrs        with food

 

 Pantoprazole  NDC  95262273834  40 MG Orally      Active  1 tablet



 Sodium      Once a day        

 

 Collagenase  NDC  [address]-09  250 UNIT/GM      Active  1 application



       Externally        to affected



       Once a day        area







Results

No Known Results



Summary Purpose

[employer] Submission

## 2018-09-18 NOTE — ER
Nurse's Notes                                                                                     

 [facility]                                                                

Name: Redd Dale Jr                                                                            

Age: 32 yrs                                                                                       

Sex: Male                                                                                         

: 1985                                                                                   

MRN: [id number]                                                                                   

Arrival Date: 2018                                                                          

Time: 11:16                                                                                       

Account#: [id number]                                                                            

Bed 6                                                                                             

Private MD:                                                                                       

Diagnosis: Urinary tract infection, site not specified;Non-pressure chronic ulcer of other part of

  left foot;Non-pressure chronic ulcer of other part of right foot                                

                                                                                                  

Presentation:                                                                                     

                                                                                             

11:19 Presenting complaint: EMS states: R foot pain for days that radiates up to chest. Pt    ss  

      was discharged from hospital recently for sepsis. Pt has chronic wounds on bilateral        

      feet and states that the pain since discharge has not improved, but gotten worse.           

      Transition of care: patient was not received from another setting of care. Onset of         

      symptoms is unknown. Risk Assessment: Do you want to hurt yourself or someone else?         

      Patient reports no desire to harm self or others. Initial Sepsis Screen: Does the           

      patient meet any 2 criteria? HR > 90 bpm. Does the patient have a suspected source of       

      infection? Yes: Skin breakdown/wound. Care prior to arrival: None.                          

11:19 Method Of Arrival: EMS: [facility]                                                    ss  

11:19 Acuity: AYESHA 3                                                                           ss  

                                                                                                  

Triage Assessment:                                                                                

19:06 General: Appears in no apparent distress. comfortable, obese, Behavior is calm,         bp  

      cooperative, appropriate for age. Pain: Complains of pain in right foot and left foot.      

                                                                                                  

Historical:                                                                                       

- Allergies:                                                                                      

11:23 Amoxicillin;                                                                            ss  

11:23 Bactrim;                                                                                ss  

11:23 Ciprofloxacin;                                                                          ss  

11:23 CLAVULANIC ACID;                                                                        ss  

11:23 Doxycycline;                                                                            ss  

11:23 Levofloxacin;                                                                           ss  

11:23 Morphine;                                                                               ss  

11:23 Toradol;                                                                                ss  

11:23 Vancomycin;                                                                             ss  

11:23 Zofran;                                                                                 ss  

19:08 TRIMETHOPRIM;                                                                           bp  

- Home Meds:                                                                                      

19:08 metoprolol tartrate 25 mg Oral tab 1 tab 2 times per day [Active]; pantoprazole 40 mg   bp  

      Oral TbEC 1 tab once daily [Active]; ProMod Protein Oral liqd 30 mL twice a day             

      [Active]; Vitamin C 500 mg Oral tab twice a day [Active]; zinc sulfate 220 (50) mg Oral     

      cap daily [Active];                                                                         

- PMHx:                                                                                           

11:23 Asthma; Cerebral Palsy; cluster headaches; decubitus ulcers on feet; GERD;              ss  

      Hydrocephalus; Hypertension; spina bifida;                                                  

                                                                                                  

- Immunization history:: Adult Immunizations up to date.                                          

- Social history:: Smoking status: Patient uses tobacco products, smokes one-half pack            

  cigarettes per day.                                                                             

- Ebola Screening: : Patient denies exposure to infectious person Patient denies travel           

  to an Ebola-affected area in the 21 days before illness onset.                                  

- Family history:: not pertinent.                                                                 

- Hospitalizations: : No recent hospitalization is reported.                                      

                                                                                                  

                                                                                                  

Screenin:18 Abuse screen: Denies threats or abuse. Denies injuries from another. Nutritional        hb  

      screening: No deficits noted. Tuberculosis screening: No symptoms or risk factors           

      identified. Fall Risk Total Kim Fall Scale indicates High Risk Score (45 or more          

      points). Fall prevention measures have been instituted. Side Rails Up X 2 Frequent          

      Obs/Assessments Occuring As available patient and family educated on Fall Prevention        

      Program and Strategies.                                                                     

                                                                                                  

Assessment:                                                                                       

12:17 Reassessment: Unable to establish PIV access, Dr. Berry notified.                       hb  

13:00 Reassessment: Patient appears in no apparent distress at this time. Patient and/or      hb  

      family updated on plan of care and expected duration. Pain level reassessed. Patient is     

      alert, oriented x 3, equal unlabored respirations, skin warm/dry/pink.                      

14:00 Reassessment: Patient appears in no apparent distress at this time. Patient and/or      hb  

      family updated on plan of care and expected duration. Pain level reassessed. Patient is     

      alert, oriented x 3, equal unlabored respirations, skin warm/dry/pink.                      

15:00 Reassessment: Patient appears in no apparent distress at this time. No changes from     hb  

      previously documented assessment. Patient and/or family updated on plan of care and         

      expected duration. Pain level reassessed. Patient is alert, oriented x 3, equal             

      unlabored respirations, skin warm/dry/pink.                                                 

16:00 Reassessment: Patient appears in no apparent distress at this time. No changes from     hb  

      previously documented assessment. Patient and/or family updated on plan of care and         

      expected duration. Pain level reassessed. Patient is alert, oriented x 3, equal             

      unlabored respirations, skin warm/dry/pink.                                                 

17:00 Reassessment: Patient appears in no apparent distress at this time. No changes from     hb  

      previously documented assessment. Patient and/or family updated on plan of care and         

      expected duration. Pain level reassessed. Patient is alert, oriented x 3, equal             

      unlabored respirations, skin warm/dry/pink. Admission ordered, awaiting room assignment     

      at this time.                                                                               

17:43 Reassessment: Mother Sabrina [phone].                                               hb  

18:15 Reassessment: Patient appears in no apparent distress at this time. No changes from     hb  

      previously documented assessment. Patient and/or family updated on plan of care and         

      expected duration. Pain level reassessed. Patient is alert, oriented x 3, equal             

      unlabored respirations, skin warm/dry/pink.                                                 

                                                                                                  

Vital Signs:                                                                                      

11:23  / 89; Pulse 104; Resp 18; Temp 98.8(O); Pulse Ox 99% on R/A; Weight 131.54 kg;   ss  

      Pain 9/10;                                                                                  

12:30  / 90; Pulse 91; Resp 17; Pulse Ox 100% on R/A;                                   hb  

13:30  / 92; Pulse 100; Resp 16; Pulse Ox 100% on R/A;                                  hb  

14:00  / 91; Pulse 93; Resp 14; Pulse Ox 100% on R/A;                                   hb  

15:00  / 87; Pulse 100; Resp 15; Pulse Ox 100% on R/A;                                  hb  

17:00  / 90; Pulse 91; Resp 16; Pulse Ox 100% on R/A;                                   hb  

18:15  / 86; Pulse 72; Resp 15; Pulse Ox 100% on R/A;                                   hb  

19:00  / 87; Pulse 99; Resp 14; Pulse Ox 97% ;                                          bp  

                                                                                                  

ED Course:                                                                                        

11:16 Patient arrived in ED.                                                                  sg  

11:17 Anuel Berry MD is Attending Physician.                                                rn  

11:22 Triage completed.                                                                       ss  

11:23 Arm band placed on right wrist.                                                         ss  

12:00 Patient has correct armband on for positive identification. Placed in gown. Bed in low  hb  

      position. Call light in reach. Side rails up X2.                                            

12:10 Missed attempt(s): 20 gauge in right antecubital area. Bleeding controlled, band aid    hb  

      applied, catheter tip intact.                                                               

12:15 Missed attempt(s): 20 gauge in right antecubital area. Bleeding controlled, band aid    hb  

      applied, catheter tip intact.                                                               

12:18 Josefa Bryson, RN is Primary Nurse.                                                   hb  

13:21 Missed attempt(s): 24 gauge in left forearm. Bleeding controlled, band aid applied,     ss  

      catheter tip intact.                                                                        

13:21 Missed attempt(s): 22 gauge in left forearm.                                            ss  

14:26 XRAY Foot RIGHT 2 View In Process Unspecified.                                          EDMS

14:26 XRAY Foot LEFT 2 View In Process Unspecified.                                           EDMS

17:07 Akhil Isabel DO is Hospitalizing Provider.                                           rn  

19:06 No provider procedures requiring assistance completed. Patient did not have IV access   bp  

      during this emergency room visit.                                                           

                                                                                                  

Administered Medications:                                                                         

13:02 Drug: Demerol 50 mg Route: IM; Site: right deltoid;                                     hb  

13:45 Follow up: Response: No adverse reaction; Pain is decreased                             hb  

13:32 Not Given (Physician Discretion): Demerol 50 mg IVP once                                hb  

17:16 Drug: Demerol 25 mg Route: IM; Site: right deltoid;                                     hb  

18:14 Follow up: Response: No adverse reaction; Pain is decreased                             hb  

18:28 Not Given (Other Intervention Used): NS 0.9% 500 ml IV at bolus once                    hb  

                                                                                                  

                                                                                                  

Outcome:                                                                                          

17:08 Decision to Hospitalize by Provider.                                                    rn  

19:35 Admitted to Med/surg accompanied by tech, via stretcher, room 206, with chart, Report   bp  

      called to  SARAH COTTO                                                                         

19:35 Condition: stable                                                                           

19:35 Instructed on the need for admit.                                                           

20:00 Patient left the ED.                                                                    ak1 

                                                                                                  

Signatures:                                                                                       

Dispatcher [employer]                           EDHi Kaur, RN                         Anuel Vazquez MD MD rn Smirch, Shelby, RN RN                                                      

Radha Brambila RN RN   ak1                                                  

Josefa Bryson RN RN hb Peltier, Brian, RN                      RN   bp                                                   

                                                                                                  

Corrections: (The following items were deleted from the chart)                                    

12:44 12:26 Reassessment: Unable to establish PIV access, Dr. Berry notified. hb              hb  

                                                                                                  

**************************************************************************************************

## 2018-09-18 NOTE — RAD REPORT
EXAM DESCRIPTION:  RAD - Foot Left 2 View - 9/18/2018 2:25 pm

 

CLINICAL HISTORY:  Foot pain, soft tissue swelling, possible osteomyelitis, multifocal soft tissue wo
unds

 

COMPARISON:  August 22nd

 

FINDINGS:  No fracture, dislocation or periosteal reaction. There is a splayed configuration of the t
oes that matches the prior examination. There is pronounced extension at the ankle joint. It is presu
med that the patient is not a weight-bearing patient. By history the patient has multiple soft tissue
 wounds. There are no clearly destructive changes in the bones of the foot to indicate a site of oste
omyelitis.

 

Patient has very pronounced soft tissue swelling around the foot, ankle and distal leg. No air or for
eign body. The soft tissue pattern is not clearly different from comparison.

 

 

IMPRESSION:  No osteomyelitis or bone destructive process identifiable.

 

 Very pronounced soft tissue swelling of the foot, ankle and leg. No air, foreign body or significant
 interval change.

## 2018-09-18 NOTE — XMS REPORT
[facility]

 Created on:2018



Patient:Redd Dale

Sex:Male

:1985

External Reference #:[id number]





Demographics







 Address  [address]  [place] [address]

 

 Phone  [phone]

 

 Preferred Language  en

 

 Marital Status  Unknown

 

 Muslim Affiliation  Unknown

 

 Race  Black or 

 

 Ethnic Group  Unknown









Author







 Organization  [employer]









Care Team Providers







 Name  Role  Phone

 

 Knox, Na  Provider Role  Unavailable









Allergies, Adverse Reactions, Alerts







 Substance  Reaction  Event Type

 

 Zofran  Info Not Available  Drug Allergy

 

 Morphine Sulfate ER  Info Not Available  Drug Allergy

 

 Levaquin  Info Not Available  Drug Allergy







Problems







 Problem Type  Condition  Code  Onset Dates  Condition Status

 

 Assessment  Mental disorder, not otherwise  F99    Active



   specified      

 

 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



   disorder      

 

 Assessment  Ulcer of left foot, unspecified  L97.529    Active



   ulcer stage      

 

 Problem  Foot ulcer  L97.509    Active

 

 Assessment  Nonintractable episodic headache,  R51    Active



   unspecified headache type      

 

 Problem  Constipation  K59.00    Active

 

 Assessment  Episodic tension-type headache, not  G44.219    Active



   intractable      

 

 Problem  Paraplegia  G82.20    Active

 

 Problem  Incontinence of urine  R32    Active

 

 Problem  Nausea alone  R11.0    Active

 

 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



   disorder      

 

 Problem  Mental disorder, not otherwise  F99    Active



   specified      

 

 Assessment  Bipolar depression  F31.30    Active

 

 Assessment  Lumbar spina bifida with  Q05.2    Active



   hydrocephalus      

 

 Problem  Bipolar depression  F31.30    Active

 

 Assessment   (ventriculoperitoneal) shunt  Z98.2    Active



   status      

 

 Problem  Depression with anxiety  F41.8    Active

 

 Problem  Fecal incontinence  R15.9    Active

 

 Problem  Nausea and vomiting, intractability  R11.2    Active



   of vomiting not specified,      



   unspecified vomiting type      

 

 Problem  Ulcer of left foot, unspecified  L97.529    Active



   ulcer stage      

 

 Problem  Episodic tension-type headache, not  G44.219    Active



   intractable      

 

 Problem  Nonintractable episodic headache,  R51    Active



   unspecified headache type      

 

 Assessment  Depression with anxiety  F41.8    Active

 

 Problem  Dependence on wheelchair  Z99.3    Active

 

 Problem  Lumbar spina bifida with  Q05.2    Active



   hydrocephalus      

 

 Problem   (ventriculoperitoneal) shunt  Z98.2    Active



   status      

 

 Problem  Nicotine dependence  F17.200    Active







Medications







 Medication  Code  Code  Instructions  Start  End  Status  Dosage



   System      Date  Date    

 

 Collagenase  NDC  [address]-00  250 UNIT/GM      Active  1 application



       Externally        to affected



       Once a day        area

 

 Macrobid  NDC  25335922943  100 MG Orally      Active  1 capsule



       every 12 hrs        with food

 

 Tylenol with  NDC  22352442708  300-60 MG      Active  1 tablet as



 Codeine #4      Orally every 6        needed



       hrs        

 

 Citalopram  NDC  26124306758  10 MG Orally  July    Active  1 tablet



 Hydrobromide      Once a day  2018      

 

 Pantoprazole  NDC  70177896733  40 MG Orally      Active  1 tablet



 Sodium      Once a day        

 

 Seroquel  NDC  18202024516  25 MG Orally  July    Active  1 tablet



       Once a day at  16,      



       bedtime        







Results

No Known Results



Summary Purpose

[employer] Submission

## 2018-09-18 NOTE — XMS REPORT
Clinical Summary

 Created on:2018



Patient:Redd Dale

Sex:Male

:1985

External Reference #:[id number]





Demographics







 Address  [address]



   [place] [address]

 

 Home Phone  [phone]

 

 Preferred Language  English

 

 Marital Status  Unknown

 

 Zoroastrian Affiliation  Unknown

 

 Race  Black or 

 

 Ethnic Group  Not  or 









Author







 Organization  [facility]

 

 Address  [address] [place] [address]

 

 Phone  [phone]









Support







 Name  Relationship  Address  Phone

 

 Unavailable  Unavailable  [address]  [phone]



     [place] [address]  









Care Team Providers







 Name  Role  Phone

 

 Unavailable  Primary Care Provider  Unavailable









Allergies







 Active Allergy  Reactions  Severity  Noted Date  Comments

 

 Sulfamethoxazole-Trimethoprim  Hives  High  2017  

 

 Levofloxacin  Hives  High  2017  

 

 Morphine  Hives  High  2017  

 

 Ketorolac  Rash  High  2017  

 

 Vancomycin Analogues  Rash  High  2017  

 

 Sesame Seed  Hives    2017  

 

 Ondansetron Hcl (Pf)  Nausea And Vomiting    2017  







Current Medications







 Prescription  Sig.  Disp.  Refills  Start Date  End Date  Status

 

 oxyCODONE-acetaminophen  Take 1 tablet by          Active



 (PERCOCET)  mg per  mouth every 4          



 tablet  (four) hours as          



   needed for Pain.          







Active Problems







 Problem  Noted Date

 

 Spina bifida (HCC)  2017

 

 Pyelonephritis  2017







Social History







 Tobacco Use  Types  Packs/Day  Years Used  Date

 

 Current Every Day Smoker  Cigarettes  0.5    









 Tobacco Cessation: Ready to Quit: No









 Sex Assigned at Birth  Date Recorded

 

 Not on file  







Last Filed Vital Signs

Not on file



Plan of Treatment

Not on file



Results

Not on fileafter 2017

## 2018-09-18 NOTE — XMS REPORT
[facility]

 Created on:2018



Patient:Redd Dale

Sex:Male

:1985

External Reference #:[id number]





Demographics







 Address  [address] [place] [address]

 

 Phone  [phone]

 

 Preferred Language  en

 

 Marital Status  Unknown

 

 Confucianist Affiliation  Unknown

 

 Race  Black or 

 

 Ethnic Group  Not  or 









Author







 Organization  [employer]









Care Team Providers







 Name  Role  Phone

 

 Knox, Na  Provider Role  Unavailable









Allergies

No Known Allergies



Problems







 Problem Type  Condition  Code  Onset Dates  Condition Status

 

 Problem  Nicotine dependence  F17.200    Active

 

 Problem  Lumbar spina bifida with  Q05.2    Active



   hydrocephalus      

 

 Problem  Dependence on wheelchair  Z99.3    Active

 

 Problem  Fecal incontinence  R15.9    Active

 

 Problem  Foot ulcer  L97.509    Active

 

 Problem  Depression with anxiety  F41.8    Active

 

 Problem  Paraplegia  G82.20    Active

 

 Problem  Constipation  K59.00    Active

 

 Problem  Incontinence of urine  R32    Active

 

 Problem  Nausea alone  R11.0    Active

 

 Problem  Episodic tension-type headache, not  G44.219    Active



   intractable      

 

 Problem  Nonintractable episodic headache,  R51    Active



   unspecified headache type      

 

 Problem   (ventriculoperitoneal) shunt  Z98.2    Active



   status      







Medications

No Known Medications



Results

No Known Results



Summary Purpose

[employer] Submission

## 2018-09-18 NOTE — XMS REPORT
[facility]

 Created on:September 15, 2018



Patient:Redd Dale

Sex:Male

:1985

External Reference #:[id number]





Demographics







 Address  [address] [place] [address]

 

 Phone  [phone]

 

 Preferred Language  en

 

 Marital Status  Unknown

 

 Shinto Affiliation  Unknown

 

 Race  Black or 

 

 Ethnic Group  Unknown









Author







 Organization  [employer]









Care Team Providers







 Name  Role  Phone

 

 Knox, Na  Provider Role  Unavailable









Allergies, Adverse Reactions, Alerts







 Substance  Reaction  Event Type

 

 Zofran  Info Not Available  Drug Allergy

 

 Morphine Sulfate ER  Info Not Available  Drug Allergy

 

 Levaquin  Info Not Available  Drug Allergy







Problems







 Problem Type  Condition  Code  Onset Dates  Condition Status

 

 Assessment  Blood tests for routine general  Z00.00    Active



   physical examination      

 

 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



   disorder      

 

 Problem  Constipation  K59.00    Active

 

 Assessment  Ulcer of left foot, unspecified  L97.529    Active



   ulcer stage      

 

 Problem  Paraplegia  G82.20    Active

 

 Assessment   (ventriculoperitoneal) shunt  Z98.2    Active



   status      

 

 Problem  Nausea alone  R11.0    Active

 

 Problem  Fecal incontinence  R15.9    Active

 

 Problem  Incontinence of urine  R32    Active

 

 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



   disorder      

 

 Problem  Mental disorder, not otherwise  F99    Active



   specified      

 

 Assessment  Depression with anxiety  F41.8    Active

 

 Assessment  Bipolar depression  F31.30    Active

 

 Problem  Bipolar depression  F31.30    Active

 

 Assessment  Lumbar spina bifida with  Q05.2    Active



   hydrocephalus      

 

 Problem  Blood tests for routine general  Z00.00    Active



   physical examination      

 

 Problem  Depression with anxiety  F41.8    Active

 

 Problem  Nausea and vomiting, intractability  R11.2    Active



   of vomiting not specified,      



   unspecified vomiting type      

 

 Problem  Ulcer of left foot, unspecified  L97.529    Active



   ulcer stage      

 

 Problem  Nonintractable episodic headache,  R51    Active



   unspecified headache type      

 

 Problem   (ventriculoperitoneal) shunt  Z98.2    Active



   status      

 

 Problem  Episodic tension-type headache, not  G44.219    Active



   intractable      

 

 Problem  Lumbar spina bifida with  Q05.2    Active



   hydrocephalus      

 

 Problem  Foot ulcer  L97.509    Active

 

 Problem  Nicotine dependence  F17.200    Active

 

 Problem  Dependence on wheelchair  Z99.3    Active







Medications







 Medication  Code  Code  Instructions  Start  End  Status  Dosage



   System      Date  Date    

 

 Macrobid  NDC  91515053854  100 MG Orally      Active  1 capsule



       every 12 hrs        with food

 

 Tylenol with  NDC  [id number]  300-60 MG      Active  1 tablet as



 Codeine #4      Orally every 6        needed



       hrs        

 

 Pantoprazole  NDC  [id number]  40 MG Orally      Active  1 tablet



 Sodium      Once a day        

 

 Citalopram  NDC  [id number]  10 MG Orally      Active  1 tablet



 Hydrobromide      Once a day        

 

 Collagenase  NDC  [address]-23  250 UNIT/GM      Active  1 application



       Externally        to affected



       Once a day        area

 

 Seroquel  NDC  [id number]  25 MG Orally      Active  1 tablet



       Once a day at        



       bedtime        







Results

No Known Results



Summary Purpose

[employer] Submission

## 2018-09-18 NOTE — XMS REPORT
Patient Summary Document

 Created on:2018



Patient:JORDAN VERDIN

Sex:Male

:1985

External Reference #:[id number]





Demographics







 Address  [address]



   [place] [address]

 

 Home Phone  [phone]

 

 Preferred Language  Unknown

 

 Marital Status  Unknown

 

 Congregational Affiliation  Unknown

 

 Race  Unknown

 

 Additional Race(s)  Unavailable

 

 Ethnic Group  Unknown









Author







 Organization  [facility]

 

 Address  [facility] [address] [place] [address]

 

 Phone  [phone]









Care Team Providers







 Name  Role  Phone

 

 MELISSARAMU  Unavailable  Unavailable









Problems

This patient has no known problems.



Allergies, Adverse Reactions, Alerts

This patient has no known allergies or adverse reactions.



Medications

This patient has no known medications.



Results







 Test Description  Test Time  Test Comments  Text Results  Atomic Results  
Result Comments









 BLOOD CULTURE  2017 16:22:00    









   Test Item  Value  Reference Range  Comments









 CULTURE (BEAKER) (test code=1095)  No growth in 5 days    



BLOOD [id number] 16:22:00





 Test Item  Value  Reference Range  Comments

 

 CULTURE (BEAKER) (test code=1095)  No growth in 5 days    



(MANUAL DIFFERENTIAL)2017 22:29:00





 Test Item  Value  Reference Range  Comments

 

 TOTAL COUNTED (BEAKER) (test code=1351)      

 

 WBC MORPHOLOGY (BEAKER) (test code=487)  Normal    

 

 PLT MORPHOLOGY (BEAKER) (test code=486)  Normal    

 

 RBC MORPHOLOGY (BEAKER) (test code=762)  Normal    



CBC W/PLT COUNT &amp; AUTO [id number] 22:28:00





 Test Item  Value  Reference Range  Comments

 

 WHITE BLOOD CELL COUNT (BEAKER) (test code=775)  7.3 K/ L  4.0-10.0  

 

 RED BLOOD CELL COUNT (BEAKER) (test code=761)  5.09 M/ L  4.20-5.80  

 

 HEMOGLOBIN (BEAKER) (test code=410)  13.0 GM/DL  13.0-16.8  

 

 HEMATOCRIT (BEAKER) (test code=411)  42.3 %  40.0-50.0  

 

 MEAN CORPUSCULAR VOLUME (BEAKER) (test code=753)  83.0 fL  82.0-98.0  

 

 MEAN CORPUSCULAR HEMOGLOBIN (BEAKER) (test  25.6 pg  27.0-33.0  



 code=751)      

 

 MEAN CORPUSCULAR HEMOGLOBIN CONC (BEAKER) (test  30.8 GM/DL  32.0-36.0  



 code=752)      

 

 RED CELL DISTRIBUTION WIDTH (BEAKER) (test  18.0 %  10.3-14.2  



 code=412)      

 

 PLATELET COUNT (BEAKER) (test code=756)  331 K/CU MM  150-430  

 

 MEAN PLATELET VOLUME (BEAKER) (test code=754)  8.5 fL  6.5-10.5  

 

 NUCLEATED RED BLOOD CELLS (BEAKER) (test  0 /100 WBC  0-0  



 code=413)      

 

 NEUTROPHILS RELATIVE PERCENT (BEAKER) (test  65 %    



 code=429)      

 

 LYMPHOCYTES RELATIVE PERCENT (BEAKER) (test  23 %    



 code=430)      

 

 MONOCYTES RELATIVE PERCENT (BEAKER) (test  8 %    



 code=431)      

 

 EOSINOPHILS RELATIVE PERCENT (BEAKER) (test  3 %    



 code=432)      

 

 BASOPHILS RELATIVE PERCENT (BEAKER) (test  1 %    



 code=437)      

 

 NEUTROPHILS ABSOLUTE COUNT (BEAKER) (test  4.75 K/ L  1.80-8.00  



 code=670)      

 

 LYMPHOCYTES ABSOLUTE COUNT (BEAKER) (test  1.68 K/ L  1.48-4.50  



 code=414)      

 

 MONOCYTES ABSOLUTE COUNT (BEAKER) (test  0.55 K/ L  0.00-1.30  



 code=415)      

 

 EOSINOPHILS ABSOLUTE COUNT (BEAKER) (test  0.24 K/ L  0.00-0.50  



 code=416)      

 

 BASOPHILS ABSOLUTE COUNT (BEAKER) (test  0.05 K/ L  0.00-0.20  



 code=417)      



[id number] METABOLIC [id number] 11:11:00





 Test Item  Value  Reference Range  Comments

 

 SODIUM (BEAKER) (test  138 meq/L  136-145  



 nvez=759)      

 

 POTASSIUM (BEAKER) (test  4.0 meq/L  3.5-5.1  



 code=379)      

 

 CHLORIDE (BEAKER) (test  108 meq/L    



 code=382)      

 

 CO2 (BEAKER) (test  23 meq/L  22-29  



 code=355)      

 

 BLOOD UREA NITROGEN  11 mg/dL  7-21  



 (BEAKER) (test code=354)      

 

 CREATININE (BEAKER) (test  0.74 mg/dL  0.57-1.25  



 code=358)      

 

 GLUCOSE RANDOM (BEAKER)  124 mg/dL    



 (test code=652)      

 

 CALCIUM (BEAKER) (test  8.9 mg/dL  8.4-10.2  



 code=697)      

 

 EGFR (BEAKER) (test  150 mL/min/1.73 sq m    ESTIMATED GFR IS NOT AS



 code=1092)      ACCURATE AS CREATININE



       CLEARANCE IN PREDICTING



       GLOMERULAR FILTRATION



       RATE. ESTIMATED GFR IS



       NOT APPLICABLE FOR



       DIALYSIS PATIENTS.



URINE [id number] 09:56:00





 Test Item  Value  Reference Range  Comments

 

 CULTURE (BEAKER) (test code=1095)  <10,000 col/mL skin jaime    



COMPREHENSIVE METABOLIC [id number] 08:49:00





 Test Item  Value  Reference Range  Comments

 

 TOTAL PROTEIN (BEAKER)  7.3 gm/dL  6.0-8.3  



 (test code=770)      

 

 ALBUMIN (BEAKER) (test  3.3 g/dL  3.5-5.0  



 code=1145)      

 

 ALKALINE PHOSPHATASE  89 U/L    



 (BEAKER) (test code=346)      

 

 BILIRUBIN TOTAL (BEAKER)  1.4 mg/dL  0.2-1.2  



 (test code=377)      

 

 SODIUM (BEAKER) (test  137 meq/L  136-145  



 fmwj=540)      

 

 POTASSIUM (BEAKER) (test  3.7 meq/L  3.5-5.1  



 code=379)      

 

 CHLORIDE (BEAKER) (test  108 meq/L    



 code=382)      

 

 CO2 (BEAKER) (test  17 meq/L  22-29  



 code=355)      

 

 BLOOD UREA NITROGEN  12 mg/dL  7-21  



 (BEAKER) (test code=354)      

 

 CREATININE (BEAKER) (test  0.78 mg/dL  0.57-1.25  



 code=358)      

 

 GLUCOSE RANDOM (BEAKER)  119 mg/dL    



 (test code=652)      

 

 CALCIUM (BEAKER) (test  8.6 mg/dL  8.4-10.2  



 code=697)      

 

 AST (SGOT) (BEAKER) (test  13 U/L  5-34  



 code=353)      

 

 ALT (SGPT) (BEAKER) (test  28 U/L  6-55  



 code=347)      

 

 EGFR (BEAKER) (test  141 mL/min/1.73 sq    ESTIMATED GFR IS NOT AS



 code=1092)  m    ACCURATE AS CREATININE



       CLEARANCE IN PREDICTING



       GLOMERULAR FILTRATION



       RATE. ESTIMATED GFR IS



       NOT APPLICABLE FOR



       DIALYSIS PATIENTS.



CBC W/PLT COUNT &amp; AUTO [id number] 08:46:00





 Test Item  Value  Reference Range  Comments

 

 WHITE BLOOD CELL COUNT (BEAKER) (test code=775)  9.4 K/ L  4.0-10.0  

 

 RED BLOOD CELL COUNT (BEAKER) (test code=761)  4.79 M/ L  4.20-5.80  

 

 HEMOGLOBIN (BEAKER) (test code=410)  12.8 GM/DL  13.0-16.8  

 

 HEMATOCRIT (BEAKER) (test code=411)  40.0 %  40.0-50.0  

 

 MEAN CORPUSCULAR VOLUME (BEAKER) (test code=753)  83.4 fL  82.0-98.0  

 

 MEAN CORPUSCULAR HEMOGLOBIN (BEAKER) (test  26.7 pg  27.0-33.0  



 code=751)      

 

 MEAN CORPUSCULAR HEMOGLOBIN CONC (BEAKER) (test  32.0 GM/DL  32.0-36.0  



 code=752)      

 

 RED CELL DISTRIBUTION WIDTH (BEAKER) (test  18.2 %  10.3-14.2  



 code=412)      

 

 PLATELET COUNT (BEAKER) (test code=756)  313 K/CU MM  150-430  

 

 MEAN PLATELET VOLUME (BEAKER) (test code=754)  8.6 fL  6.5-10.5  

 

 NUCLEATED RED BLOOD CELLS (BEAKER) (test  0 /100 WBC  0-0  



 code=413)      

 

 NEUTROPHILS RELATIVE PERCENT (BEAKER) (test  70 %    



 code=429)      

 

 LYMPHOCYTES RELATIVE PERCENT (BEAKER) (test  16 %    



 code=430)      

 

 MONOCYTES RELATIVE PERCENT (BEAKER) (test  12 %    



 code=431)      

 

 EOSINOPHILS RELATIVE PERCENT (BEAKER) (test  1 %    



 code=432)      

 

 BASOPHILS RELATIVE PERCENT (BEAKER) (test  1 %    



 code=437)      

 

 NEUTROPHILS ABSOLUTE COUNT (BEAKER) (test  6.54 K/ L  1.80-8.00  



 code=670)      

 

 LYMPHOCYTES ABSOLUTE COUNT (BEAKER) (test  1.50 K/ L  1.48-4.50  



 code=414)      

 

 MONOCYTES ABSOLUTE COUNT (BEAKER) (test  1.13 K/ L  0.00-1.30  



 code=415)      

 

 EOSINOPHILS ABSOLUTE COUNT (BEAKER) (test  0.13 K/ L  0.00-0.50  



 code=416)      

 

 BASOPHILS ABSOLUTE COUNT (BEAKER) (test  0.06 K/ L  0.00-0.20  



 code=417)      



0.00URINALYSIS W/ [id number] 20:36:00





 Test Item  Value  Reference Range  Comments

 

 COLOR (BEAKER) (test code=470)  Yellow    

 

 CLARITY (BEAKER) (test code=469)  Hazy    

 

 SPECIFIC GRAVITY UA (BEAKER) (test code=468)  1.012  1.001-1.035  

 

 PH UA (BEAKER) (test code=467)  5.5  5.0-8.0  

 

 PROTEIN UA (BEAKER) (test code=464)  100 mg/dL  Negative  

 

 GLUCOSE UA (BEAKER) (test code=365)  Negative  Negative  

 

 KETONES UA (BEAKER) (test code=371)  Negative  Negative  

 

 BILIRUBIN UA (BEAKER) (test code=462)  Negative  Negative  

 

 BLOOD UA (BEAKER) (test code=461)  Moderate  Negative  

 

 NITRITE UA (BEAKER) (test code=465)  Negative  Negative  

 

 LEUKOCYTE ESTERASE UA (BEAKER) (test code=466)  Large  Negative  

 

 UROBILINOGEN UA (BEAKER) (test code=463)  3.0 mg/dL  0.2-1.0  

 

 RBC UA (BEAKER) (test code=519)  19 /HPF    

 

 WBC UA (BEAKER) (test code=520)  182 /HPF    

 

 SOURCE(BEAKER) (test code=2795)      



BASIC METABOLIC [id number] 17:00:00





 Test Item  Value  Reference Range  Comments

 

 SODIUM (BEAKER) (test  140 meq/L  136-145  



 zuaw=122)      

 

 POTASSIUM (BEAKER) (test  3.7 meq/L  3.5-5.1  



 code=379)      

 

 CHLORIDE (BEAKER) (test  112 meq/L    



 code=382)      

 

 CO2 (BEAKER) (test  17 meq/L  22-29  



 code=355)      

 

 BLOOD UREA NITROGEN  23 mg/dL  7-21  



 (BEAKER) (test code=354)      

 

 CREATININE (BEAKER) (test  1.00 mg/dL  0.57-1.25  



 code=358)      

 

 GLUCOSE RANDOM (BEAKER)  102 mg/dL    



 (test code=652)      

 

 CALCIUM (BEAKER) (test  8.7 mg/dL  8.4-10.2  



 code=697)      

 

 EGFR (BEAKER) (test  106 mL/min/1.73 sq m    ESTIMATED GFR IS NOT AS



 code=1092)      ACCURATE AS CREATININE



       CLEARANCE IN PREDICTING



       GLOMERULAR FILTRATION



       RATE. ESTIMATED GFR IS



       NOT APPLICABLE FOR



       DIALYSIS PATIENTS.



Specimen slightly ictericCBC W/PLT COUNT &amp; AUTO [id number] 12:18
:00





 Test Item  Value  Reference Range  Comments

 

 WHITE BLOOD CELL COUNT (BEAKER) (test code=775)  16.4 K/ L  4.0-10.0  

 

 RED BLOOD CELL COUNT (BEAKER) (test code=761)  5.22 M/ L  4.20-5.80  

 

 HEMOGLOBIN (BEAKER) (test code=410)  14.4 GM/DL  13.0-16.8  

 

 HEMATOCRIT (BEAKER) (test code=411)  42.9 %  40.0-50.0  

 

 MEAN CORPUSCULAR VOLUME (BEAKER) (test code=753)  82.2 fL  82.0-98.0  

 

 MEAN CORPUSCULAR HEMOGLOBIN (BEAKER) (test  27.5 pg  27.0-33.0  



 code=751)      

 

 MEAN CORPUSCULAR HEMOGLOBIN CONC (BEAKER) (test  33.5 GM/DL  32.0-36.0  



 code=752)      

 

 RED CELL DISTRIBUTION WIDTH (BEAKER) (test  16.2 %  10.3-14.2  



 code=412)      

 

 PLATELET COUNT (BEAKER) (test code=756)  329 K/CU MM  150-430  

 

 MEAN PLATELET VOLUME (BEAKER) (test code=754)  8.2 fL  6.5-10.5  

 

 NUCLEATED RED BLOOD CELLS (BEAKER) (test  0 /100 WBC  0-0  



 code=413)      

 

 NEUTROPHILS RELATIVE PERCENT (BEAKER) (test  83 %    



 code=429)      

 

 LYMPHOCYTES RELATIVE PERCENT (BEAKER) (test  7 %    



 code=430)      

 

 MONOCYTES RELATIVE PERCENT (BEAKER) (test  9 %    



 code=431)      

 

 EOSINOPHILS RELATIVE PERCENT (BEAKER) (test  0 %    



 code=432)      

 

 BASOPHILS RELATIVE PERCENT (BEAKER) (test  0 %    



 code=437)      

 

 NEUTROPHILS ABSOLUTE COUNT (BEAKER) (test  1.62 K/ L  1.80-8.00  



 code=670)      

 

 LYMPHOCYTES ABSOLUTE COUNT (BEAKER) (test  1.15 K/ L  1.48-4.50  



 code=414)      

 

 MONOCYTES ABSOLUTE COUNT (BEAKER) (test  1.56 K/ L  0.00-1.30  



 code=415)      

 

 EOSINOPHILS ABSOLUTE COUNT (BEAKER) (test  0.01 K/ L  0.00-0.50  



 code=416)      

 

 BASOPHILS ABSOLUTE COUNT (BEAKER) (test  0.02 K/ L  0.00-0.20  



 code=417)      



(MANUAL DIFFERENTIAL)2017 12:18:00





 Test Item  Value  Reference Range  Comments

 

 TOTAL COUNTED (BEAKER) (test code=1351)      

 

 WBC MORPHOLOGY (BEAKER) (test code=487)  Normal    

 

 PLT MORPHOLOGY (BEAKER) (test code=486)  Normal    

 

 RBC MORPHOLOGY (BEAKER) (test code=762)  Normal

## 2018-09-19 VITALS — SYSTOLIC BLOOD PRESSURE: 129 MMHG | TEMPERATURE: 97.1 F | DIASTOLIC BLOOD PRESSURE: 89 MMHG

## 2018-09-19 VITALS — OXYGEN SATURATION: 95 %

## 2018-09-19 LAB
BUN BLD-MCNC: 13 MG/DL (ref 7–18)
GLUCOSE SERPLBLD-MCNC: 86 MG/DL (ref 74–106)
HCT VFR BLD CALC: 39 % (ref 39.6–49)
LYMPHOCYTES # SPEC AUTO: 1.9 K/UL (ref 0.7–4.9)
MAGNESIUM SERPL-MCNC: 2.1 MG/DL (ref 1.8–2.4)
MCH RBC QN AUTO: 27.1 PG (ref 27–35)
MCV RBC: 80.8 FL (ref 80–100)
PMV BLD: 8.6 FL (ref 7.6–11.3)
POTASSIUM SERPL-SCNC: 4.3 MMOL/L (ref 3.5–5.1)
RBC # BLD: 4.83 M/UL (ref 4.33–5.43)

## 2018-09-19 RX ADMIN — GABAPENTIN SCH: 300 CAPSULE ORAL at 09:00

## 2018-09-19 RX ADMIN — SODIUM CHLORIDE SCH MLS: 0.9 INJECTION, SOLUTION INTRAVENOUS at 01:48

## 2018-09-19 RX ADMIN — Medication SCH ML: at 01:48

## 2018-09-19 RX ADMIN — Medication SCH: at 09:00

## 2018-09-19 RX ADMIN — SODIUM CHLORIDE SCH MLS: 0.9 INJECTION, SOLUTION INTRAVENOUS at 12:40

## 2018-09-19 RX ADMIN — ENOXAPARIN SODIUM SCH MG: 40 INJECTION SUBCUTANEOUS at 09:39

## 2018-09-19 RX ADMIN — OXYBUTYNIN CHLORIDE SCH MG: 5 TABLET, EXTENDED RELEASE ORAL at 09:39

## 2018-09-19 RX ADMIN — GABAPENTIN SCH: 300 CAPSULE ORAL at 13:54

## 2018-09-19 RX ADMIN — SODIUM CHLORIDE SCH MLS: 9 INJECTION, SOLUTION INTRAVENOUS at 01:00

## 2018-09-19 RX ADMIN — PANTOPRAZOLE SODIUM SCH MG: 40 TABLET, DELAYED RELEASE ORAL at 09:39

## 2018-09-19 RX ADMIN — SODIUM CHLORIDE SCH MLS: 9 INJECTION, SOLUTION INTRAVENOUS at 09:39

## 2018-09-19 RX ADMIN — SODIUM CHLORIDE SCH: 0.9 INJECTION, SOLUTION INTRAVENOUS at 04:00

## 2018-09-19 NOTE — P.DS
Admission Date: 09/18/18


Discharge Date: 09/19/18


Primary Care Provider: Dr. Knox; Surgery-Dr. Dorado


Disposition: ROUTINE DISCHARGE


Discharge Condition: GOOD


Reason for Admission: Right lower extremity ulcer pain


Consultations: 





Surgery-Dr. Dorado


Procedures: 





Medical Problem List: 


Right foot pain with Chronic ulcer to the forefoot and anterior ankle region


Multiple Chronic ulcers to the Left thigh and Left heel as well


History of Recurrent, complicated UTI with chronic indwelling catheter. Patient 

is asymptomatic with bacteriuria. 


Hx of Spina bifida


Chronic pain with Neuropathy





Brief History of Present Illness: 





32-year-old  male with multiple chronic wounds to the lower 

extremity.  Patient also has a history of recurrent UTI.  Patient was recently 

hospitalized for UTI a requiring IV antibiotic therapy.  Patient came into the 

emergency room complaining of pain to the right lower extremity.  He has a 

large ulcer to the wound.  He was to follow up with wound care over the past 2 

weeks but has not.  He was post to follow up with him again this week.  Patient 

came to the ER for evaluation.





In the ER patient evaluated.  White count normal at 9.6, sodium 139, potassium 

4.2.  Urinalysis showed some leukocytes.  Pain to the right lower extremity 

noted. Patient was admitted for observation.





When I saw the patient the ER, he appeared stable.  No significant erythema 

noted. Ulcer noted to the right lower extremity.  Pain noted with palpation.


Hospital Course: 





Patient did well during the course of his stay. Pain to the right foot 

improved. Swelling also improved. Patient has Chronic ulcer to the forefoot and 

anterior ankle region. He also has multiple chronic ulcers to the left upper 

back thigh and left heel. Procalcitonin and WBC remains normal. I was able to 

discuss the case at length with Wound Care team while he had dressing changes. 

From the Care team perspective, the wounds do not appear significantly 

abnormal. Recommendation is to continue with Wound care and strict follow up 

with Surgery at [facility]ound Care. They will obtain wound culture and follow up the 

results next week to consider antibiotics.  He will need to follow up at the 

[facility]ound Care Clinic as directed to continue his care. 





Patient has History of Recurrent, complicated UTI with chronic indwelling 

catheter. Patient is asymptomatic with bacteriuria. WBC is within normal range. 

No complaints noted.  He was recently hospitalized and treated with IV Merrem. 

No need for Outpatient IV antibiotics at this time. He will continue with 

chronic indwelling catheter care. UTI prevention will need to be enforced. 





Patient with Hx of Spina bifida and Chronic pain with Neuropathy. Patient will 

continue with his current meds. He is not to deviate with his pain medication. 

He will need to follow up with his PCP to further monitor and adjust. 


Vital Signs/Physical Exam: 














Temp Pulse Resp BP Pulse Ox


 


 97.9 F   82   20   109/59 L  94 


 


 09/19/18 04:00  09/19/18 04:00  09/19/18 04:00  09/19/18 04:00  09/19/18 04:00








General: Alert, In no apparent distress, Oriented x3, Cooperative


HEENT: Atraumatic


Neck: Supple


Respiratory: Clear to auscultation bilaterally, Normal air movement


Cardiovascular: Normal pulses, Regular rate/rhythm


Gastrointestinal: Normal bowel sounds, Soft and benign, Non-distended


Integumentary: Other (Chronic ulcer to the right foot along the forefoot and 

ankle region anteriorly. No edema or erythema noted. )


Laboratory Data at Discharge: 














WBC  8.1 K/uL (4.3-10.9)  D 09/19/18  04:46    


 


Hgb  13.1 g/dL (13.6-17.9)  L  09/19/18  04:46    


 


Hct  39.0 % (39.6-49.0)  L  09/19/18  04:46    


 


Plt Count  390 K/uL (152-406)   09/19/18  04:46    


 


Sodium  139 mmol/L (136-145)   09/19/18  04:46    


 


Potassium  4.3 mmol/L (3.5-5.1)   09/19/18  04:46    


 


BUN  13 mg/dL (7-18)   09/19/18  04:46    


 


Creatinine  0.60 mg/dL (0.55-1.3)   09/19/18  04:46    


 


Glucose  86 mg/dL ()   09/19/18  04:46    


 


Magnesium  2.1 mg/dL (1.8-2.4)   09/19/18  04:46    








Home Medications: 








Oxycodone HCl [Roxicodone] 5 mg PO QID PRN 08/23/18 





Patient Discharge Instructions: 1. Patient will need to follow up with his PCP 

in one week to follow up this hospitalization.  2.  Patient did well during the 

course of his stay. Pain, erythema, swelling to the right foot improved. 

Patient has Chronic ulcer to the dorsal aspect of forefoot and anterior ankle 

region. He also has multiple chronic ulcers to the left upper back thigh and 

left heel. Procalcitonin and WBC remains normal. Case discussed with Wound Care 

team while he had dressing changes. From the Wound Care team perspective, the 

wounds do not appear significantly abnormal. Recommendation is to continue with 

Wound care and strict follow up with Surgery at [facility]. They will 

obtain wound culture and follow up the results next week to consider 

antibiotics.  He will need to follow up at the Wound Care Clinic as directed to 

continue his care.  3.  Patient has History of Recurrent, complicated UTI with 

chronic indwelling catheter. Patient is asymptomatic with bacteriuria. WBC is 

within normal range. No complaints noted.  He was recently hospitalized and 

treated with IV Merrem. No need for Outpatient IV antibiotics at this time. He 

will continue with chronic indwelling catheter care. UTI prevention will need 

to be enforced.  4. Patient with Hx of Spina bifida and Chronic pain with 

Neuropathy. Patient will continue with his current meds. He is not to deviate 

with his pain medication. He will need to follow up with his PCP to further 

monitor and adjust.


Diet: Regular


Activity: Fall precautions


Time spent managing pt's care (in minutes): 55

## 2018-10-11 ENCOUNTER — HOSPITAL ENCOUNTER (EMERGENCY)
Dept: HOSPITAL 97 - ER | Age: 33
Discharge: HOME | End: 2018-10-11
Payer: COMMERCIAL

## 2018-10-11 VITALS — DIASTOLIC BLOOD PRESSURE: 92 MMHG | OXYGEN SATURATION: 100 % | SYSTOLIC BLOOD PRESSURE: 150 MMHG

## 2018-10-11 VITALS — TEMPERATURE: 98.2 F

## 2018-10-11 DIAGNOSIS — Z88.3: ICD-10-CM

## 2018-10-11 DIAGNOSIS — Z88.5: ICD-10-CM

## 2018-10-11 DIAGNOSIS — I10: ICD-10-CM

## 2018-10-11 DIAGNOSIS — Z88.8: ICD-10-CM

## 2018-10-11 DIAGNOSIS — E86.0: Primary | ICD-10-CM

## 2018-10-11 DIAGNOSIS — Z88.1: ICD-10-CM

## 2018-10-11 LAB
ALBUMIN SERPL BCP-MCNC: 2.9 G/DL (ref 3.4–5)
ALP SERPL-CCNC: 242 U/L (ref 45–117)
ALT SERPL W P-5'-P-CCNC: 90 U/L (ref 12–78)
AST SERPL W P-5'-P-CCNC: 15 U/L (ref 15–37)
BUN BLD-MCNC: 10 MG/DL (ref 7–18)
CKMB CREATINE KINASE MB: < 1 NG/ML (ref 0.3–3.6)
GLUCOSE SERPLBLD-MCNC: 85 MG/DL (ref 74–106)
HCT VFR BLD CALC: 29.6 % (ref 39.6–49)
INR BLD: 1.15
LIPASE SERPL-CCNC: 81 U/L (ref 73–393)
LYMPHOCYTES # SPEC AUTO: 2.2 K/UL (ref 0.7–4.9)
MCH RBC QN AUTO: 31.8 PG (ref 27–35)
MCV RBC: 90.7 FL (ref 80–100)
PMV BLD: 8.5 FL (ref 7.6–11.3)
POTASSIUM SERPL-SCNC: 4.4 MMOL/L (ref 3.5–5.1)
RBC # BLD: 3.26 M/UL (ref 4.33–5.43)
TROPONIN (EMERG DEPT USE ONLY): < 0.02 NG/ML (ref 0–0.04)
UA COMPLETE W REFLEX CULTURE PNL UR: (no result)

## 2018-10-11 PROCEDURE — 93005 ELECTROCARDIOGRAM TRACING: CPT

## 2018-10-11 PROCEDURE — 85610 PROTHROMBIN TIME: CPT

## 2018-10-11 PROCEDURE — 80048 BASIC METABOLIC PNL TOTAL CA: CPT

## 2018-10-11 PROCEDURE — 80076 HEPATIC FUNCTION PANEL: CPT

## 2018-10-11 PROCEDURE — 85025 COMPLETE CBC W/AUTO DIFF WBC: CPT

## 2018-10-11 PROCEDURE — 83690 ASSAY OF LIPASE: CPT

## 2018-10-11 PROCEDURE — 81015 MICROSCOPIC EXAM OF URINE: CPT

## 2018-10-11 PROCEDURE — 87088 URINE BACTERIA CULTURE: CPT

## 2018-10-11 PROCEDURE — 85730 THROMBOPLASTIN TIME PARTIAL: CPT

## 2018-10-11 PROCEDURE — 81003 URINALYSIS AUTO W/O SCOPE: CPT

## 2018-10-11 PROCEDURE — 87040 BLOOD CULTURE FOR BACTERIA: CPT

## 2018-10-11 PROCEDURE — 87186 SC STD MICRODIL/AGAR DIL: CPT

## 2018-10-11 PROCEDURE — 87077 CULTURE AEROBIC IDENTIFY: CPT

## 2018-10-11 PROCEDURE — 36415 COLL VENOUS BLD VENIPUNCTURE: CPT

## 2018-10-11 PROCEDURE — 96374 THER/PROPH/DIAG INJ IV PUSH: CPT

## 2018-10-11 PROCEDURE — 83605 ASSAY OF LACTIC ACID: CPT

## 2018-10-11 PROCEDURE — 82553 CREATINE MB FRACTION: CPT

## 2018-10-11 PROCEDURE — 99284 EMERGENCY DEPT VISIT MOD MDM: CPT

## 2018-10-11 PROCEDURE — 87086 URINE CULTURE/COLONY COUNT: CPT

## 2018-10-11 PROCEDURE — 71045 X-RAY EXAM CHEST 1 VIEW: CPT

## 2018-10-11 PROCEDURE — 84484 ASSAY OF TROPONIN QUANT: CPT

## 2018-10-11 PROCEDURE — 82550 ASSAY OF CK (CPK): CPT

## 2018-10-11 PROCEDURE — 84145 PROCALCITONIN (PCT): CPT

## 2018-10-11 NOTE — XMS REPORT
Clinical Summary

 Created on:2018



Patient:Redd Dale

Sex:Male

:1985

External Reference #:[id number]





Demographics







 Address  [address]



   [place] [address]

 

 Home Phone  [phone]

 

 Preferred Language  English

 

 Marital Status  Unknown

 

 Holiness Affiliation  Unknown

 

 Race  Black or 

 

 Ethnic Group  Not  or 









Author







 Organization  [facility]

 

 Address  [address] [place] [address]

 

 Phone  [phone]









Support







 Name  Relationship  Address  Phone

 

 Unavailable  Unavailable  [address]  [phone]



     [place] [address]  









Care Team Providers







 Name  Role  Phone

 

 Unavailable  Primary Care Provider  Unavailable









Allergies







 Active Allergy  Reactions  Severity  Noted Date  Comments

 

 Sulfamethoxazole-Trimethoprim  Hives  High  2017  

 

 Levofloxacin  Hives  High  2017  

 

 Morphine  Hives  High  2017  

 

 Ketorolac  Rash  High  2017  

 

 Vancomycin Analogues  Rash  High  2017  

 

 Sesame Seed  Hives    2017  

 

 Ondansetron Hcl (Pf)  Nausea And Vomiting    2017  







Current Medications







 Prescription  Sig.  Disp.  Refills  Start Date  End Date  Status

 

 oxyCODONE-acetaminophen  Take 1 tablet by          Active



 (PERCOCET)  mg per  mouth every 4          



 tablet  (four) hours as          



   needed for Pain.          







Active Problems







 Problem  Noted Date

 

 Spina bifida (HCC)  2017

 

 Pyelonephritis  2017







Social History







 Tobacco Use  Types  Packs/Day  Years Used  Date

 

 Current Every Day Smoker  Cigarettes  0.5    









 Tobacco Cessation: Ready to Quit: No









 Sex Assigned at Birth  Date Recorded

 

 Not on file  







Last Filed Vital Signs

Not on file



Plan of Treatment

Not on file



Results

Not on fileafter 10/10/2017

## 2018-10-11 NOTE — RAD REPORT
EXAM DESCRIPTION:  RAD - Chest Single View - 10/11/2018 2:11 pm

 

CLINICAL HISTORY:  Right-sided chest and abdominal pain

 

COMPARISON:  August 26

 

TECHNIQUE:  AP portable chest image was obtained 1402 hours .

 

FINDINGS:  Lungs are clear. Heart and vasculature are normal. No measurable pleural effusion and no p
neumothorax. No acute bony abnormality seen. Shunt tubing overlies the chest. No free air under the d
iaphragm. No acute aortic finding.

 

IMPRESSION:  No acute cardiopulmonary process.

 

No significant change from comparison.

## 2018-10-11 NOTE — XMS REPORT
[facility]

 Created on:September 15, 2018



Patient:Redd Dale

Sex:Male

:1985

External Reference #:[id number]





Demographics







 Address  [address] [place] [address]

 

 Phone  [phone]

 

 Preferred Language  en

 

 Marital Status  Unknown

 

 Christianity Affiliation  Unknown

 

 Race  Black or 

 

 Ethnic Group  Unknown









Author







 Organization  [employer]









Care Team Providers







 Name  Role  Phone

 

 Knox, Na  Provider Role  Unavailable









Allergies, Adverse Reactions, Alerts







 Substance  Reaction  Event Type

 

 Zofran  Info Not Available  Drug Allergy

 

 Morphine Sulfate ER  Info Not Available  Drug Allergy

 

 Levaquin  Info Not Available  Drug Allergy







Problems







 Problem Type  Condition  Code  Onset Dates  Condition Status

 

 Assessment  Blood tests for routine general  Z00.00    Active



   physical examination      

 

 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



   disorder      

 

 Problem  Constipation  K59.00    Active

 

 Assessment  Ulcer of left foot, unspecified  L97.529    Active



   ulcer stage      

 

 Problem  Paraplegia  G82.20    Active

 

 Assessment   (ventriculoperitoneal) shunt  Z98.2    Active



   status      

 

 Problem  Nausea alone  R11.0    Active

 

 Problem  Fecal incontinence  R15.9    Active

 

 Problem  Incontinence of urine  R32    Active

 

 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



   disorder      

 

 Problem  Mental disorder, not otherwise  F99    Active



   specified      

 

 Assessment  Depression with anxiety  F41.8    Active

 

 Assessment  Bipolar depression  F31.30    Active

 

 Problem  Bipolar depression  F31.30    Active

 

 Assessment  Lumbar spina bifida with  Q05.2    Active



   hydrocephalus      

 

 Problem  Blood tests for routine general  Z00.00    Active



   physical examination      

 

 Problem  Depression with anxiety  F41.8    Active

 

 Problem  Nausea and vomiting, intractability  R11.2    Active



   of vomiting not specified,      



   unspecified vomiting type      

 

 Problem  Ulcer of left foot, unspecified  L97.529    Active



   ulcer stage      

 

 Problem  Nonintractable episodic headache,  R51    Active



   unspecified headache type      

 

 Problem   (ventriculoperitoneal) shunt  Z98.2    Active



   status      

 

 Problem  Episodic tension-type headache, not  G44.219    Active



   intractable      

 

 Problem  Lumbar spina bifida with  Q05.2    Active



   hydrocephalus      

 

 Problem  Foot ulcer  L97.509    Active

 

 Problem  Nicotine dependence  F17.200    Active

 

 Problem  Dependence on wheelchair  Z99.3    Active







Medications







 Medication  Code  Code  Instructions  Start  End  Status  Dosage



   System      Date  Date    

 

 Macrobid  NDC  63589675225  100 MG Orally      Active  1 capsule



       every 12 hrs        with food

 

 Tylenol with  NDC  [id number]  300-60 MG      Active  1 tablet as



 Codeine #4      Orally every 6        needed



       hrs        

 

 Pantoprazole  NDC  [id number]  40 MG Orally      Active  1 tablet



 Sodium      Once a day        

 

 Citalopram  NDC  23531420587  10 MG Orally      Active  1 tablet



 Hydrobromide      Once a day        

 

 Collagenase  NDC  [address]-84  250 UNIT/GM      Active  1 application



       Externally        to affected



       Once a day        area

 

 Seroquel  NDC  70075462086  25 MG Orally      Active  1 tablet



       Once a day at        



       bedtime        







Results

No Known Results



Summary Purpose

[employer] Submission

## 2018-10-11 NOTE — XMS REPORT
Patient Summary Document

 Created on:2018



Patient:JORDAN VERDIN

Sex:Male

:1985

External Reference #:[id number]





Demographics







 Address  [address]



   [place] [address]

 

 Home Phone  [phone]

 

 Work Phone  [phone]

 

 Email Address  [phone]

 

 Preferred Language  Unknown

 

 Marital Status  Unknown

 

 Gnosticist Affiliation  Unknown

 

 Race  Unknown

 

 Additional Race(s)  Unavailable

 

 Ethnic Group  Unknown









Author







 Organization  [facility]

 

 Address  [address] [place] [address] [address]



   [place] [address]

 

 Phone  [phone]









Care Team Providers







 Name  Role  Phone

 

 RAMU TORRES  Unavailable  Unavailable









Problems

This patient has no known problems.



Allergies, Adverse Reactions, Alerts

This patient has no known allergies or adverse reactions.



Medications

This patient has no known medications.



Results







 Test Description  Test Time  Test Comments  Text Results  Atomic Results  
Result Comments









 BLOOD CULTURE  2017 16:22:00    









   Test Item  Value  Reference Range  Comments









 CULTURE (BEAKER) (test code=1095)  No growth in 5 days    



BLOOD [id number] 16:22:00





 Test Item  Value  Reference Range  Comments

 

 CULTURE (BEAKER) (test code=1095)  No growth in 5 days    



(MANUAL DIFFERENTIAL)2017 22:29:00





 Test Item  Value  Reference Range  Comments

 

 TOTAL COUNTED (BEAKER) (test code=1351)      

 

 WBC MORPHOLOGY (BEAKER) (test code=487)  Normal    

 

 PLT MORPHOLOGY (BEAKER) (test code=486)  Normal    

 

 RBC MORPHOLOGY (BEAKER) (test code=762)  Normal    



CBC W/PLT COUNT &amp; AUTO [id number] 22:28:00





 Test Item  Value  Reference Range  Comments

 

 WHITE BLOOD CELL COUNT (BEAKER) (test code=775)  7.3 K/ L  4.0-10.0  

 

 RED BLOOD CELL COUNT (BEAKER) (test code=761)  5.09 M/ L  4.20-5.80  

 

 HEMOGLOBIN (BEAKER) (test code=410)  13.0 GM/DL  13.0-16.8  

 

 HEMATOCRIT (BEAKER) (test code=411)  42.3 %  40.0-50.0  

 

 MEAN CORPUSCULAR VOLUME (BEAKER) (test code=753)  83.0 fL  82.0-98.0  

 

 MEAN CORPUSCULAR HEMOGLOBIN (BEAKER) (test  25.6 pg  27.0-33.0  



 code=751)      

 

 MEAN CORPUSCULAR HEMOGLOBIN CONC (BEAKER) (test  30.8 GM/DL  32.0-36.0  



 code=752)      

 

 RED CELL DISTRIBUTION WIDTH (BEAKER) (test  18.0 %  10.3-14.2  



 code=412)      

 

 PLATELET COUNT (BEAKER) (test code=756)  331 K/CU MM  150-430  

 

 MEAN PLATELET VOLUME (BEAKER) (test code=754)  8.5 fL  6.5-10.5  

 

 NUCLEATED RED BLOOD CELLS (BEAKER) (test  0 /100 WBC  0-0  



 code=413)      

 

 NEUTROPHILS RELATIVE PERCENT (BEAKER) (test  65 %    



 code=429)      

 

 LYMPHOCYTES RELATIVE PERCENT (BEAKER) (test  23 %    



 code=430)      

 

 MONOCYTES RELATIVE PERCENT (BEAKER) (test  8 %    



 code=431)      

 

 EOSINOPHILS RELATIVE PERCENT (BEAKER) (test  3 %    



 code=432)      

 

 BASOPHILS RELATIVE PERCENT (BEAKER) (test  1 %    



 code=437)      

 

 NEUTROPHILS ABSOLUTE COUNT (BEAKER) (test  4.75 K/ L  1.80-8.00  



 code=670)      

 

 LYMPHOCYTES ABSOLUTE COUNT (BEAKER) (test  1.68 K/ L  1.48-4.50  



 code=414)      

 

 MONOCYTES ABSOLUTE COUNT (BEAKER) (test  0.55 K/ L  0.00-1.30  



 code=415)      

 

 EOSINOPHILS ABSOLUTE COUNT (BEAKER) (test  0.24 K/ L  0.00-0.50  



 code=416)      

 

 BASOPHILS ABSOLUTE COUNT (BEAKER) (test  0.05 K/ L  0.00-0.20  



 code=417)      



[id number] METABOLIC [id number] 11:11:00





 Test Item  Value  Reference Range  Comments

 

 SODIUM (BEAKER) (test  138 meq/L  136-145  



 jmwn=958)      

 

 POTASSIUM (BEAKER) (test  4.0 meq/L  3.5-5.1  



 code=379)      

 

 CHLORIDE (BEAKER) (test  108 meq/L    



 code=382)      

 

 CO2 (BEAKER) (test  23 meq/L  22-29  



 code=355)      

 

 BLOOD UREA NITROGEN  11 mg/dL  7-21  



 (BEAKER) (test code=354)      

 

 CREATININE (BEAKER) (test  0.74 mg/dL  0.57-1.25  



 code=358)      

 

 GLUCOSE RANDOM (BEAKER)  124 mg/dL    



 (test code=652)      

 

 CALCIUM (BEAKER) (test  8.9 mg/dL  8.4-10.2  



 code=697)      

 

 EGFR (BEAKER) (test  150 mL/min/1.73 sq m    ESTIMATED GFR IS NOT AS



 code=1092)      ACCURATE AS CREATININE



       CLEARANCE IN PREDICTING



       GLOMERULAR FILTRATION



       RATE. ESTIMATED GFR IS



       NOT APPLICABLE FOR



       DIALYSIS PATIENTS.



URINE [id number] 09:56:00





 Test Item  Value  Reference Range  Comments

 

 CULTURE (BEAKER) (test code=1095)  <10,000 col/mL skin jaime    



COMPREHENSIVE METABOLIC [id number] 08:49:00





 Test Item  Value  Reference Range  Comments

 

 TOTAL PROTEIN (BEAKER)  7.3 gm/dL  6.0-8.3  



 (test code=770)      

 

 ALBUMIN (BEAKER) (test  3.3 g/dL  3.5-5.0  



 code=1145)      

 

 ALKALINE PHOSPHATASE  89 U/L    



 (BEAKER) (test code=346)      

 

 BILIRUBIN TOTAL (BEAKER)  1.4 mg/dL  0.2-1.2  



 (test code=377)      

 

 SODIUM (BEAKER) (test  137 meq/L  136-145  



 dvtu=335)      

 

 POTASSIUM (BEAKER) (test  3.7 meq/L  3.5-5.1  



 code=379)      

 

 CHLORIDE (BEAKER) (test  108 meq/L    



 code=382)      

 

 CO2 (BEAKER) (test  17 meq/L  22-29  



 code=355)      

 

 BLOOD UREA NITROGEN  12 mg/dL  7-21  



 (BEAKER) (test code=354)      

 

 CREATININE (BEAKER) (test  0.78 mg/dL  0.57-1.25  



 code=358)      

 

 GLUCOSE RANDOM (BEAKER)  119 mg/dL    



 (test code=652)      

 

 CALCIUM (BEAKER) (test  8.6 mg/dL  8.4-10.2  



 code=697)      

 

 AST (SGOT) (BEAKER) (test  13 U/L  5-34  



 code=353)      

 

 ALT (SGPT) (BEAKER) (test  28 U/L  6-55  



 code=347)      

 

 EGFR (BEAKER) (test  141 mL/min/1.73 sq    ESTIMATED GFR IS NOT AS



 code=1092)  m    ACCURATE AS CREATININE



       CLEARANCE IN PREDICTING



       GLOMERULAR FILTRATION



       RATE. ESTIMATED GFR IS



       NOT APPLICABLE FOR



       DIALYSIS PATIENTS.



CBC W/PLT COUNT &amp; AUTO [id number] 08:46:00





 Test Item  Value  Reference Range  Comments

 

 WHITE BLOOD CELL COUNT (BEAKER) (test code=775)  9.4 K/ L  4.0-10.0  

 

 RED BLOOD CELL COUNT (BEAKER) (test code=761)  4.79 M/ L  4.20-5.80  

 

 HEMOGLOBIN (BEAKER) (test code=410)  12.8 GM/DL  13.0-16.8  

 

 HEMATOCRIT (BEAKER) (test code=411)  40.0 %  40.0-50.0  

 

 MEAN CORPUSCULAR VOLUME (BEAKER) (test code=753)  83.4 fL  82.0-98.0  

 

 MEAN CORPUSCULAR HEMOGLOBIN (BEAKER) (test  26.7 pg  27.0-33.0  



 code=751)      

 

 MEAN CORPUSCULAR HEMOGLOBIN CONC (BEAKER) (test  32.0 GM/DL  32.0-36.0  



 code=752)      

 

 RED CELL DISTRIBUTION WIDTH (BEAKER) (test  18.2 %  10.3-14.2  



 code=412)      

 

 PLATELET COUNT (BEAKER) (test code=756)  313 K/CU MM  150-430  

 

 MEAN PLATELET VOLUME (BEAKER) (test code=754)  8.6 fL  6.5-10.5  

 

 NUCLEATED RED BLOOD CELLS (BEAKER) (test  0 /100 WBC  0-0  



 code=413)      

 

 NEUTROPHILS RELATIVE PERCENT (BEAKER) (test  70 %    



 code=429)      

 

 LYMPHOCYTES RELATIVE PERCENT (BEAKER) (test  16 %    



 code=430)      

 

 MONOCYTES RELATIVE PERCENT (BEAKER) (test  12 %    



 code=431)      

 

 EOSINOPHILS RELATIVE PERCENT (BEAKER) (test  1 %    



 code=432)      

 

 BASOPHILS RELATIVE PERCENT (BEAKER) (test  1 %    



 code=437)      

 

 NEUTROPHILS ABSOLUTE COUNT (BEAKER) (test  6.54 K/ L  1.80-8.00  



 code=670)      

 

 LYMPHOCYTES ABSOLUTE COUNT (BEAKER) (test  1.50 K/ L  1.48-4.50  



 code=414)      

 

 MONOCYTES ABSOLUTE COUNT (BEAKER) (test  1.13 K/ L  0.00-1.30  



 code=415)      

 

 EOSINOPHILS ABSOLUTE COUNT (BEAKER) (test  0.13 K/ L  0.00-0.50  



 code=416)      

 

 BASOPHILS ABSOLUTE COUNT (BEAKER) (test  0.06 K/ L  0.00-0.20  



 code=417)      



0.00URINALYSIS W/ [id number] 20:36:00





 Test Item  Value  Reference Range  Comments

 

 COLOR (BEAKER) (test code=470)  Yellow    

 

 CLARITY (BEAKER) (test code=469)  Hazy    

 

 SPECIFIC GRAVITY UA (BEAKER) (test code=468)  1.012  1.001-1.035  

 

 PH UA (BEAKER) (test code=467)  5.5  5.0-8.0  

 

 PROTEIN UA (BEAKER) (test code=464)  100 mg/dL  Negative  

 

 GLUCOSE UA (BEAKER) (test code=365)  Negative  Negative  

 

 KETONES UA (BEAKER) (test code=371)  Negative  Negative  

 

 BILIRUBIN UA (BEAKER) (test code=462)  Negative  Negative  

 

 BLOOD UA (BEAKER) (test code=461)  Moderate  Negative  

 

 NITRITE UA (BEAKER) (test code=465)  Negative  Negative  

 

 LEUKOCYTE ESTERASE UA (BEAKER) (test code=466)  Large  Negative  

 

 UROBILINOGEN UA (BEAKER) (test code=463)  3.0 mg/dL  0.2-1.0  

 

 RBC UA (BEAKER) (test code=519)  19 /HPF    

 

 WBC UA (BEAKER) (test code=520)  182 /HPF    

 

 SOURCE(BEAKER) (test code=9079)      



BASIC METABOLIC [id number] 17:00:00





 Test Item  Value  Reference Range  Comments

 

 SODIUM (BEAKER) (test  140 meq/L  136-145  



 kcme=860)      

 

 POTASSIUM (BEAKER) (test  3.7 meq/L  3.5-5.1  



 code=379)      

 

 CHLORIDE (BEAKER) (test  112 meq/L    



 code=382)      

 

 CO2 (BEAKER) (test  17 meq/L  22-29  



 code=355)      

 

 BLOOD UREA NITROGEN  23 mg/dL  7-21  



 (BEAKER) (test code=354)      

 

 CREATININE (BEAKER) (test  1.00 mg/dL  0.57-1.25  



 code=358)      

 

 GLUCOSE RANDOM (BEAKER)  102 mg/dL    



 (test code=652)      

 

 CALCIUM (BEAKER) (test  8.7 mg/dL  8.4-10.2  



 code=697)      

 

 EGFR (BEAKER) (test  106 mL/min/1.73 sq m    ESTIMATED GFR IS NOT AS



 code=1092)      ACCURATE AS CREATININE



       CLEARANCE IN PREDICTING



       GLOMERULAR FILTRATION



       RATE. ESTIMATED GFR IS



       NOT APPLICABLE FOR



       DIALYSIS PATIENTS.



Specimen slightly ictericCBC W/PLT COUNT &amp; AUTO [id number] 12:18
:00





 Test Item  Value  Reference Range  Comments

 

 WHITE BLOOD CELL COUNT (BEAKER) (test code=775)  16.4 K/ L  4.0-10.0  

 

 RED BLOOD CELL COUNT (BEAKER) (test code=761)  5.22 M/ L  4.20-5.80  

 

 HEMOGLOBIN (BEAKER) (test code=410)  14.4 GM/DL  13.0-16.8  

 

 HEMATOCRIT (BEAKER) (test code=411)  42.9 %  40.0-50.0  

 

 MEAN CORPUSCULAR VOLUME (BEAKER) (test code=753)  82.2 fL  82.0-98.0  

 

 MEAN CORPUSCULAR HEMOGLOBIN (BEAKER) (test  27.5 pg  27.0-33.0  



 code=751)      

 

 MEAN CORPUSCULAR HEMOGLOBIN CONC (BEAKER) (test  33.5 GM/DL  32.0-36.0  



 code=752)      

 

 RED CELL DISTRIBUTION WIDTH (BEAKER) (test  16.2 %  10.3-14.2  



 code=412)      

 

 PLATELET COUNT (BEAKER) (test code=756)  329 K/CU MM  150-430  

 

 MEAN PLATELET VOLUME (BEAKER) (test code=754)  8.2 fL  6.5-10.5  

 

 NUCLEATED RED BLOOD CELLS (BEAKER) (test  0 /100 WBC  0-0  



 code=413)      

 

 NEUTROPHILS RELATIVE PERCENT (BEAKER) (test  83 %    



 code=429)      

 

 LYMPHOCYTES RELATIVE PERCENT (BEAKER) (test  7 %    



 code=430)      

 

 MONOCYTES RELATIVE PERCENT (BEAKER) (test  9 %    



 code=431)      

 

 EOSINOPHILS RELATIVE PERCENT (BEAKER) (test  0 %    



 code=432)      

 

 BASOPHILS RELATIVE PERCENT (BEAKER) (test  0 %    



 code=437)      

 

 NEUTROPHILS ABSOLUTE COUNT (BEAKER) (test  1.62 K/ L  1.80-8.00  



 code=670)      

 

 LYMPHOCYTES ABSOLUTE COUNT (BEAKER) (test  1.15 K/ L  1.48-4.50  



 code=414)      

 

 MONOCYTES ABSOLUTE COUNT (BEAKER) (test  1.56 K/ L  0.00-1.30  



 code=415)      

 

 EOSINOPHILS ABSOLUTE COUNT (BEAKER) (test  0.01 K/ L  0.00-0.50  



 code=416)      

 

 BASOPHILS ABSOLUTE COUNT (BEAKER) (test  0.02 K/ L  0.00-0.20  



 code=417)      



(MANUAL DIFFERENTIAL)2017 12:18:00





 Test Item  Value  Reference Range  Comments

 

 TOTAL COUNTED (BEAKER) (test code=1351)      

 

 WBC MORPHOLOGY (BEAKER) (test code=487)  Normal    

 

 PLT MORPHOLOGY (BEAKER) (test code=486)  Normal    

 

 RBC MORPHOLOGY (BEAKER) (test code=762)  Normal

## 2018-10-11 NOTE — XMS REPORT
[facility]

 Created on:2018



Patient:Redd Dale

Sex:Male

:1985

External Reference #:[id number]





Demographics







 Address  [address]  [place] [address]

 

 Phone  [phone]

 

 Preferred Language  en

 

 Marital Status  Unknown

 

 [employer] Affiliation  Unknown

 

 Race  Black or 

 

 Ethnic Group  Unknown









Author







 Organization  [employer]









Care Team Providers







 Name  Role  Phone

 

 Knox, Na  Provider Role  Unavailable









Allergies, Adverse Reactions, Alerts







 Substance  Reaction  Event Type

 

 Zofran  Info Not Available  Drug Allergy

 

 Morphine Sulfate ER  Info Not Available  Drug Allergy

 

 Levaquin  Info Not Available  Drug Allergy







Problems







 Problem Type  Condition  Code  Onset Dates  Condition Status

 

 Assessment  Mental disorder, not otherwise  F99    Active



   specified      

 

 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



   disorder      

 

 Assessment  Ulcer of left foot, unspecified  L97.529    Active



   ulcer stage      

 

 Problem  Foot ulcer  L97.509    Active

 

 Assessment  Nonintractable episodic headache,  R51    Active



   unspecified headache type      

 

 Problem  Constipation  K59.00    Active

 

 Assessment  Episodic tension-type headache, not  G44.219    Active



   intractable      

 

 Problem  Paraplegia  G82.20    Active

 

 Problem  Incontinence of urine  R32    Active

 

 Problem  Nausea alone  R11.0    Active

 

 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



   disorder      

 

 Problem  Mental disorder, not otherwise  F99    Active



   specified      

 

 Assessment  Bipolar depression  F31.30    Active

 

 Assessment  Lumbar spina bifida with  Q05.2    Active



   hydrocephalus      

 

 Problem  Bipolar depression  F31.30    Active

 

 Assessment   (ventriculoperitoneal) shunt  Z98.2    Active



   status      

 

 Problem  Depression with anxiety  F41.8    Active

 

 Problem  Fecal incontinence  R15.9    Active

 

 Problem  Nausea and vomiting, intractability  R11.2    Active



   of vomiting not specified,      



   unspecified vomiting type      

 

 Problem  Ulcer of left foot, unspecified  L97.529    Active



   ulcer stage      

 

 Problem  Episodic tension-type headache, not  G44.219    Active



   intractable      

 

 Problem  Nonintractable episodic headache,  R51    Active



   unspecified headache type      

 

 Assessment  Depression with anxiety  F41.8    Active

 

 Problem  Dependence on wheelchair  Z99.3    Active

 

 Problem  Lumbar spina bifida with  Q05.2    Active



   hydrocephalus      

 

 Problem   (ventriculoperitoneal) shunt  Z98.2    Active



   status      

 

 Problem  Nicotine dependence  F17.200    Active







Medications







 Medication  Code  Code  Instructions  Start  End  Status  Dosage



   System      Date  Date    

 

 Collagenase  NDC  [address]-45  250 UNIT/GM      Active  1 application



       Externally        to affected



       Once a day        area

 

 Macrobid  NDC  97672714604  100 MG Orally      Active  1 capsule



       every 12 hrs        with food

 

 Tylenol with  NDC  17820943104  300-60 MG      Active  1 tablet as



 Codeine #4      Orally every 6        needed



       hrs        

 

 Citalopram  NDC  89384462946  10 MG Orally  July    Active  1 tablet



 Hydrobromide      Once a day  2018      

 

 Pantoprazole  NDC  13031171770  40 MG Orally      Active  1 tablet



 Sodium      Once a day        

 

 Seroquel  NDC  61362150043  25 MG Orally  July    Active  1 tablet



       Once a day at  16,      



       bedtime        







Results

No Known Results



Summary Purpose

[employer]ClinicalWorks Submission

## 2018-10-11 NOTE — EDPHYS
Physician Documentation                                                                           

 [facility]                                                                

Name: Redd Dale Jr                                                                            

Age: 32 yrs                                                                                       

Sex: Male                                                                                         

: 1985                                                                                   

MRN: [id number]                                                                                   

Arrival Date: 10/11/2018                                                                          

Time: 11:11                                                                                       

Account#: [id number]                                                                            

Bed 24                                                                                            

Private MD: Camelia Knox                                                                              

ED Physician Juan Luis Lerner                                                                       

HPI:                                                                                              

10/12                                                                                             

07:51 This 32 yrs old Black Male presents to ER via Wheelchair with complaints of Abdominal   kdr 

      Pain.                                                                                       

07:51 The patient presents with abdominal pain in the right upper quadrant, right lower       kdr 

      quadrant. Onset: The symptoms/episode began/occurred gradually, yesterday. The symptoms     

      do not radiate. Associated signs and symptoms: Pertinent positives: diarrhea, Pertinent     

      negatives: nausea and vomiting, anorexia, blood in stools, chest pain, constipation,        

      dysuria, fever, headache, hematuria, nausea, palpitations, shortness of breath,             

      testicular pain, vomiting, vomiting blood. The symptoms are described as achy, burning,     

      crampy, dull, intermittent, vague, waxing/waning. Modifying factors: The symptoms are       

      alleviated by nothing, the symptoms are aggravated by drinking, food, medication(s).        

      Severity of pain: At its worst the pain was mild moderate just prior to arrival, in the     

      emergency department the pain is unchanged. The patient has experienced similar             

      episodes in the past, multiple times. The patient has been recently seen by a               

      physician: the patient's primary care provider.                                             

                                                                                                  

Historical:                                                                                       

- Allergies:                                                                                      

10/11                                                                                             

11:18 Amoxicillin;                                                                            sv  

11:18 Bactrim;                                                                                sv  

11:18 Ciprofloxacin;                                                                          sv  

11:18 CLAVULANIC ACID;                                                                        sv  

11:18 Doxycycline;                                                                            sv  

11:18 Levofloxacin;                                                                           sv  

11:18 Morphine;                                                                               sv  

11:18 Toradol;                                                                                sv  

11:18 TRIMETHOPRIM;                                                                           sv  

11:18 Vancomycin;                                                                             sv  

11:18 Zofran;                                                                                 sv  

- PMHx:                                                                                           

11:18 Asthma; Cerebral Palsy; cluster headaches; decubitus ulcers on feet; GERD;              sv  

      Hydrocephalus; Hypertension; spina bifida;                                                  

                                                                                                  

- Immunization history:: Flu vaccine is up to date.                                               

- Ebola Screening: : No symptoms or risks identified at this time.                                

- Social history:: Smoking status: Patient/guardian denies using tobacco.                         

                                                                                                  

                                                                                                  

ROS:                                                                                              

10/12                                                                                             

07:51 Constitutional: Negative for fever, chills, and weight loss, Eyes: Negative for injury, kdr 

      pain, redness, and discharge, Neck: Negative for injury, pain, and swelling,                

      Cardiovascular: Negative for chest pain, palpitations, and edema, Respiratory: Negative     

      for shortness of breath, cough, wheezing, and pleuritic chest pain, Back: Negative for      

      injury and pain, : Negative for injury, bleeding, discharge, and swelling,                

      MS/Extremity: Negative for injury and deformity aside from congential dysplasia Skin:       

      Negative for injury, rash, and discoloration, Neuro: Negative for headache, weakness,       

      numbness, tingling, and seizure activity.                                                   

      Abdomen/GI: Positive for abdominal pain, nausea, diarrhea, green, Negative for              

                                                                                                  

Exam:                                                                                             

07:51 Constitutional:  This is a well developed, well nourished patient who is awake, alert,  kdr 

      and in no acute distress. Head/Face:  Normocephalic, atraumatic. Eyes:  Pupils equal        

      round and reactive to light, extra-ocular motions intact.  Lids and lashes normal.          

      Conjunctiva and sclera are non-icteric and not injected.  Cornea within normal limits.      

      Periorbital areas with no swelling, redness, or edema. Neck:  Trachea midline, no           

      thyromegaly or masses palpated, and no cervical lymphadenopathy.  Supple, full range of     

      motion without nuchal rigidity, or vertebral point tenderness.  No Meningismus.             

      Chest/axilla:  Normal chest wall appearance and motion.  Nontender with no deformity.       

      No lesions are appreciated. Cardiovascular:  Regular rate and rhythm with a normal S1       

      and S2.  No gallops, murmurs, or rubs.  Normal PMI, no JVD.  No pulse deficits.             

      Respiratory:  Lungs have equal breath sounds bilaterally, clear to auscultation and         

      percussion.  No rales, rhonchi or wheezes noted.  No increased work of breathing, no        

      retractions or nasal flaring. Abdomen/GI:  Soft, non-tender, with normal bowel sounds.      

      No distension or tympany.  No guarding or rebound.  No evidence of tenderness               

      throughout. Back:  No spinal tenderness.  No costovertebral tenderness.  Full range of      

      motion. Skin:  Warm, dry with normal turgor.  Normal color with no rashes, no lesions,      

      and no evidence of cellulitis. Neuro:  Awake and alert, GCS 15, oriented to person,         

      place, time, and situation.  Cranial nerves II-XII grossly intact.  Motor strength 5/5      

      in all extremities.  Sensory grossly intact.  Cerebellar exam normal.  Normal gait.         

      Psych:  Awake, alert, with orientation to person, place and time.  Behavior, mood, and      

      affect are within normal limits.                                                            

07:51 Musculoskeletal/extremity: Extremities: the patient is contracted, in the left knee,        

      left ankle, right knee and right ankle, Congenital dysplasia of both lower extremities.     

                                                                                                  

Vital Signs:                                                                                      

10/11                                                                                             

11:18  / 91; Pulse 93; Resp 18; Temp 98.2; Pulse Ox 100% ; Weight 132.9 kg; Pain 9/10;  sv  

13:00  / 78; Pulse 78; Resp 16; Pulse Ox 99% on R/A;                                    em  

14:11  / 88; Pulse 87; Resp 15; Pulse Ox 100% on R/A; Pain 9/10;                        em  

15:00  / 93; Pulse 96; Resp 16; Pulse Ox 99% on R/A; Pain 9/10;                         em  

16:06  / 92; Pulse 100; Resp 18; Pulse Ox 100% on R/A;                                  kr2 

                                                                                                  

MDM:                                                                                              

16:13 Patient medically screened.                                                             kdr 

10/12                                                                                             

07:51 Data reviewed: vital signs, nurses notes, lab test result(s), radiologic studies.       kdr 

      Counseling: I had a detailed discussion with the patient and/or guardian regarding: the     

      historical points, exam findings, and any diagnostic results supporting the                 

      discharge/admit diagnosis, lab results, radiology results, the need for outpatient          

      follow up. Physician consultation: Christy Phillips MD regarding patient's condition,          

      Evaluated prior admission data with Dr. Phillips - given prior "sepsis" admission with no      

      apparent sepsis by lab data.                                                                

                                                                                                  

10/11                                                                                             

13:26 Order name: Basic Metabolic Panel                                                       kdr 

10/11                                                                                             

13:26 Order name: Blood Culture Adult (2)                                                     kdr 

10/11                                                                                             

13:26 Order name: CBC with Diff                                                               kdr 

10/11                                                                                             

13:26 Order name: Ckmb                                                                        kdr 

10/11                                                                                             

13:26 Order name: CPK                                                                         kdr 

10/11                                                                                             

13:26 Order name: Lactate; Complete Time: 14:52                                               kdr 

10/11                                                                                             

13:26 Order name: LFT's; Complete Time: 14:52                                                 kdr 

10/11                                                                                             

13:26 Order name: Lipase; Complete Time: 14:52                                                kdr 

10/11                                                                                             

13:26 Order name: Procalcitonin; Complete Time: 15:36                                         kdr 

10/11                                                                                             

13:26 Order name: Protime (+inr); Complete Time: 14:52                                        kdr 

10/11                                                                                             

13:26 Order name: Ptt, Activated; Complete Time: 14:52                                        kdr 

10/11                                                                                             

13:26 Order name: Troponin (emerg Dept Use Only); Complete Time: 14:52                        kdr 

10/11                                                                                             

13:26 Order name: Urine Microscopic Only; Complete Time: 15:36                                kdr 

10/11                                                                                             

13:26 Order name: Basic Metabolic Panel; Complete Time: 14:52                                 EDMS

10/11                                                                                             

13:26 Order name: Chest Single View XRAY; Complete Time: 14:52                                kdr 

10/11                                                                                             

13:26 Order name: Accucheck; Complete Time: 14:02                                             kdr 

10/11                                                                                             

13:26 Order name: Cardiac monitoring; Complete Time: 14:02                                    kdr 

10/11                                                                                             

13:26 Order name: EKG - Nurse/Tech; Complete Time: 14:02                                      kdr 

10/11                                                                                             

13:26 Order name: IV Saline Lock - Large Bore; Complete Time: 14:02                           kdr 

10/11                                                                                             

13:26 Order name: Blood Culture                                                               EDMS

10/11                                                                                             

13:26 Order name: CBC with Automated Diff; Complete Time: 14:52                               EDMS

10/11                                                                                             

13:26 Order name: CKMB Creatine Kinase MB; Complete Time: 14:52                               EDMS

10/11                                                                                             

13:26 Order name: Creatine Phosphokinase; Complete Time: 14:52                                EDMS

10/11                                                                                             

14:38 Order name: Urine Dipstick-Ancillary; Complete Time: 14:52                              EDMS

10/11                                                                                             

14:38 Order name: Urine Dipstick-Ancillary; Complete Time: 14:52                              EDMS

10/11                                                                                             

14:59 Order name: Urine Culture                                                               EDMS

10/11                                                                                             

13:26 Order name: Labs collected and sent; Complete Time: 14:02                               kdr 

10/11                                                                                             

13:26 Order name: O2 Per Protocol; Complete Time: 14:02                                       kdr 

10/11                                                                                             

13:26 Order name: O2 Sat Monitoring; Complete Time: 14:02                                     kdr 

10/11                                                                                             

13:26 Order name: Urine Dipstick-Ancillary (obtain specimen); Complete Time: 14:07            kdr 

                                                                                                  

Administered Medications:                                                                         

10/11                                                                                             

15:07 Drug: Norco 10 mg-325 mg 1 tabs Route: PO;                                              em  

16:20 Follow up: Response: No adverse reaction; Pain is decreased                             kr2 

15:08 Drug: Phenergan 25 mg Route: PO;                                                        em  

16:19 Follow up: Response: No adverse reaction; Nausea is decreased                           kr2 

16:19 Not Given (Patient refused, request different medication): Doxycycline 100 mg PO once   kr2 

16:48 Drug: fentaNYL (PF) 50 mcg Route: IVP; Site: right upper arm;                           kr2 

17:34 Follow up: Response: No adverse reaction; Pain is decreased                             kr2 

                                                                                                  

                                                                                                  

Disposition:                                                                                      

10/11/18 16:13 Discharged to Home. Impression: Diarrhea, unspecified, Dehydration.                

- Condition is Stable.                                                                            

- Discharge Instructions: Dehydration, Adult, Diarrhea, Adult, Easy-to-Read.                      

- Prescriptions for promethazine 25 mg Oral Tablet - take 1 tablet by ORAL route every            

  6 hours As needed; 20 tablet. Flagyl 250 mg Oral Tablet - take 1 tablet by ORAL route           

  every 8 hours for 10 days; 30 tablet.                                                           

- Medication Reconciliation Form, Thank You Letter, Antibiotic Education form.                    

- Follow up: Camelia Knox MD; When: 2 - 3 days; Reason: If symptoms return, Further                  

  diagnostic work-up, Recheck today's complaints, Continuance of care, Re-evaluation by           

  your physician.                                                                                 

- Problem is an acute exacerbation.                                                               

- Symptoms have improved.                                                                         

                                                                                                  

                                                                                                  

                                                                                                  

Signatures:                                                                                       

Dispatcher [employer]                           EDJudy Mccoy RN                    RN   Juan Luis Ramirez MD MD   kdr                                                  

Phillip Juárez LVN                       LVN  Nan Hughes RN                       RN   kr2                                                  

                                                                                                  

Corrections: (The following items were deleted from the chart)                                    

17:37 16:13 10/11/2018 16:13 Discharged to Home. Impression: Diarrhea, unspecified;           kr2 

      Dehydration. Condition is Stable. Forms are Medication Reconciliation Form, Thank You       

      Letter, Antibiotic Education, Prescription Opioid Use. Follow up: Camelia Knox; When: 2 - 3      

      days; Reason: If symptoms return, Further diagnostic work-up, Recheck today's               

      complaints, Continuance of care, Re-evaluation by your physician. Problem is an acute       

      exacerbation. Symptoms have improved. kdr                                                   

                                                                                                  

**************************************************************************************************

## 2018-10-11 NOTE — XMS REPORT
[facility]

 Created on:2018



Patient:Redd Dale

Sex:Male

:1985

External Reference #:[id number]





Demographics







 Address  [address] [place] [address]

 

 Phone  [phone]

 

 Preferred Language  en

 

 Marital Status  Unknown

 

 [employer] Affiliation  Unknown

 

 Race  Black or 

 

 Ethnic Group  Not  or 









Author







 Organization  [employer]









Care Team Providers







 Name  Role  Phone

 

 Knox, Na  Provider Role  Unavailable









Allergies

No Known Allergies



Problems







 Problem Type  Condition  Code  Onset Dates  Condition Status

 

 Problem  Nicotine dependence  F17.200    Active

 

 Problem  Lumbar spina bifida with  Q05.2    Active



   hydrocephalus      

 

 Problem  Dependence on wheelchair  Z99.3    Active

 

 Problem  Fecal incontinence  R15.9    Active

 

 Problem  Foot ulcer  L97.509    Active

 

 Problem  Depression with anxiety  F41.8    Active

 

 Problem  Paraplegia  G82.20    Active

 

 Problem  Constipation  K59.00    Active

 

 Problem  Incontinence of urine  R32    Active

 

 Problem  Nausea alone  R11.0    Active

 

 Problem  Episodic tension-type headache, not  G44.219    Active



   intractable      

 

 Problem  Nonintractable episodic headache,  R51    Active



   unspecified headache type      

 

 Problem   (ventriculoperitoneal) shunt  Z98.2    Active



   status      







Medications

No Known Medications



Results

No Known Results



Summary Purpose

[employer] Sub[employer]

## 2018-10-11 NOTE — XMS REPORT
[facility]

 Created on:2018



Patient:Redd Dale

Sex:Male

:1985

External Reference #:[id number]





Demographics







 Address  [address] [place] [address]

 

 Phone  [phone]

 

 Preferred Language  en

 

 Marital Status  Unknown

 

 Jain Affiliation  Unknown

 

 Race  Black or 

 

 Ethnic Group  Not  or 









Author







 Organization  [email or web address]









Care Team Providers







 Name  Role  Phone

 

 Knox, Na  Provider Role  Unavailable









Allergies

No Known Allergies



Problems







 Problem Type  Condition  Code  Onset Dates  Condition Status

 

 Problem  Nicotine dependence  F17.200    Active

 

 Problem  Lumbar spina bifida with  Q05.2    Active



   hydrocephalus      

 

 Problem  Dependence on wheelchair  Z99.3    Active

 

 Problem  Fecal incontinence  R15.9    Active

 

 Problem  Foot ulcer  L97.509    Active

 

 Problem  Depression with anxiety  F41.8    Active

 

 Problem  Paraplegia  G82.20    Active

 

 Problem  Constipation  K59.00    Active

 

 Problem  Incontinence of urine  R32    Active

 

 Problem  Nausea alone  R11.0    Active

 

 Problem  Episodic tension-type headache, not  G44.219    Active



   intractable      

 

 Problem  Nonintractable episodic headache,  R51    Active



   unspecified headache type      

 

 Problem   (ventriculoperitoneal) shunt  Z98.2    Active



   status      







Medications

No Known Medications



Results

No Known Results



Summary Purpose

[employer] Submission

## 2018-10-11 NOTE — XMS REPORT
Continuity of Care Document

 Created on:May 8, 2018



Patient:JORDAN VERDIN JR

Sex:Male

:1985

External Reference #:[id number]





Demographics







 Address  [address]  [place] [address]

 

 Phone  [phone]

 

 Phone  [phone]

 

 Preferred Language  Unknown

 

 Marital Status  Unknown

 

 Spiritism Affiliation  Unknown

 

 Race  Unknown

 

 Ethnic Group  Unknown









Author







 Organization  [facility]









Problems







 Problem  Status  Onset  Classification  Date  Comments  Source



     Date    Reported    



             



             



             

 

 SHUNT MALFUNCTION  Active  20        [address]



             



             

 

 ACUTE HEADACHE  Active  20        [address]



             



             

 

 Acute pain  Active    Problem  2018    [facility]



             



             

 

 Asthma  Resolved    Problem  2018    [facility]



             



             

 

 Bronchitis  Resolved    Problem  2018    [facility]



             



             

 

 Cerebral palsy  Resolved    Problem  2018    [facility]



             



             

 

 Headache  Active    Problem  2018    [facility]



             



             

 

 Hydrocephalus  Resolved    Problem  2018    [facility]



             



             

 

 Osteomyelitis  Resolved    Problem  2018    [facility]



             



             

 

 HEADACHE  Active          [facility]



             



             







Medications







 Medication  Details  Route  Status  Patient  Ordering  Order  Source



         Instructions  Provider  Date  



               



               



               

 

 Docusate Sodium  100 mg=1 cap,    Active      [facility]



 100 MG Oral  PO, BID, 0          2018  Medical



 Capsule  Refill(s)            [facility]



               



               

 

 Zosyn  0 Refill(s)    Active        [facility]



             2018  Medical



               [facility]



               



               

 

 celecoxib 200  200 mg=1 cap,    Active      [facility]



 mg oral capsule  PO, BID, 0          2018  Medical



   Refill(s)            [facility]



               



               

 

 ascorbic acid  500 mg=1 tab,    Active      [facility]



   PO, BID, 0          2018  Medical



   Refill(s)            Center



               



               

 

 acetaminophen  1,000 mg=2 tab,    Active      [facility]



 500 mg oral  PO, Q6Hnow, 0          2018  Medical



 tablet  Refill(s)            [facility]



               



               

 

 Oxycodone  5 mg=1 tab, PO,    Active      [facility]



 Hydrochloride 5  Q4H, PRN Pain          2018  Medical



 MG Oral Tablet  Score 4-6, 0            Center



   Refill(s)            



               



               

 

 zinc sulfate  220 mg=1 cap,    Active      [facility]



 220 mg oral  PO, Daily, 0          2018  Medical



 capsule  Refill(s)            [facility]



               



               

 

 multivitamin  1 tab, PO,    Active      [facility]



   Daily, 0          2018  Medical



   Refill(s)            Center



               



               

 

 methocarbamol  1,000 mg=2 tab,    Active      [facility]



 500 mg oral  PO, Q8H, 0          2018  Medical



 tablet  Refill(s)            Center



               



               

 

 LORazepam 0.5  0.5 mg=1 tab,    Active         [place]



 mg oral tablet  PO, Q8H, PRN          2018  Medical



   Anxiety, 0            Center



   Refill(s)            



               



               

 

 Lidocaine  3 patch, TOP,    Active        [facility] [place]



 Hydrochloride  Daily, Remove          2018  Medical



 0.05 MG/MG  after 12 hours,            Center



 Transdermal  0 Refill(s)            



 Patch              



 [Lidoderm]              



               



               

 

 Robaxin  1,000 mg, 2    No Longer        [facility]



   tab, Route: PO,    Active      2018  Medical



   Drug form: TAB,            Center



   Q8H, Dosing            



   Weight 127.027,            



   kg, Start date:            



   18            



   16:00:00 CDT,            



   Duration: 30            



   day, Stop date:            



   18            



   8:00:00            



   CDTNotes: (Same            



   as:Robaxin)            



               



               

 

 Oxycodone  10 mg, 2 tab,    No Longer        [facility] [place]



 Hydrochloride 5  Route: PO, Drug    Active      2018  Medical



 MG Oral Tablet  form: TAB,            Center



   Daily, Dosing            



   Weight 127.027,            



   kg, PRN            



   Procedure,            



   Start date:            



   18            



   13:06:00 CDT,            



   Duration: 30            



   day, Stop date:            



   18            



   13:05:00            



   CDTNotes: (Same            



   as: Roxicodone)            



               



               

 

 Ativan  0.5 mg, 1 tab,    No Longer        [facility]



   Route: PO, Drug    Active        Medical



   form: TAB, Q8H,            Center



   Dosing Weight            



   127.027, kg,            



   PRN Anxiety,            



   Start date:            



   18            



   10:18:00 CDT,            



   Duration: 7            



   day, Stop date:            



   18            



   10:17:00            



   CDTNotes: (Same            



   as: Ativan)            



               



               

 

 Trazodone  50 mg, 1 tab,    No Longer        [facility] [place]



 Hydrochloride  Route: PO, Drug    Active      2018  Medical



 50 MG Oral  form: TAB,            Center



 Tablet  Bedtime, Dosing            



   Weight 127.027,            



   kg, Start date:            



   18            



   21:00:00 CDT,            



   Duration: 30            



   day, Stop date:            



   18            



   21:00:00            



   CDTNotes: (Same            



   As: Desyrel)            



               



               

 

 remove patch  3 patch, Route:    No Longer        [facility] [place]



   TOP, Bedtime,    Active        Medical



   Drug form:            Center



   ERFILM, Start            



   date: 18            



   21:00:00 CDT,            



   Duration: 30            



   day, Stop date:            



   18            



   21:00:00            



   CDTNotes:            



   Remove patch 12            



   hours after            



   application            



   each day.            



               



               

 

 Oxycodone  10 mg, 2 tab,    Inactive        [facility] [place]



 Hydrochloride 5  Route: PO, Drug          2018  Medical



 MG Oral Tablet  form: TAB,            Center



   ONCE, Dosing            



   Weight 127.027,            



   kg, Start date:            



   18            



   17:01:00 CDT,            



   Stop date:            



   18            



   17:01:00            



   CDTNotes: (Same            



   as: Roxicodone)            



               



               

 

 Celebrex  200 mg, 1 cap,    No Longer         [place]



   Route: PO, Drug    Active      2018  Medical



   form: CAP, BID,            [facility]



   Dosing Weight            



   127.027, kg,            



   Start date:            



   18            



   17:00:00 CDT,            



   Duration: 30            



   day, Stop date:            



   18            



   9:00:00            



   CDTNotes:            



   NSAID. Please            



   check            



   indication. Not            



   for seizure.            



   (Same As:            



   CeleBREX)            



               



               

 

 Vancomycin  1,500 mg, 250    No Longer         [place]



   mL, Route:    Active      2018  Medical



   IVPB, Drug            Center



   form: INJ,            



   [id number], Dosing            



   Weight 127.27,            



   kg, Start date:            



   18            



   16:00:00 CDT,            



   Stop date:            



   05/10/18            



   8:00:00 CDT,            



   ABX Indication:            



   Skin/Soft            



   Tissue            



   InfectionNotes:            



   TIME CRITICAL            



   MEDICATION Same            



   as: Vancocin-NS            



   (premixed)            



   Infusion rate            



   2001 mg: infuse            



   over 2.5 hours            



               



               

 

 Lidocaine  3 patch, Route:    No Longer         [place]



 Hydrochloride  TOP, Daily,    Active      2018  Medical



 0.05 MG/MG  Drug form:            [facility]



 Transdermal  FILM, Start            



 Patch  date: 18            



 [Lidoderm]  9:00:00 CDT,            



   Duration: 7            



   day, Stop date:            



   18            



   9:00:00 CDT,            



   Remove after 12            



   hoursNotes:            



   Apply only once            



   for up to 12            



   hours in a            



   24-hour period            



   (12 hours on            



   and 12 hours            



   off). (Same as:            



   Lidoderm)            



   "Remove old            



   patch before            



   application of            



   new patch"            



               



               

 

 Phenergan  12.5 mg, 0.5    Inactive         [place]



   mL, Route:          2018  Medical



   IVPB, Drug            Center



   form: INJ,            



   ONCE, Dosing            



   Weight 127.027,            



   kg, Priority:            



   NOW, Start            



   date: 18            



   17:40:00 CDT,            



   Stop date:            



   18            



   17:40:00            



   CDTNotes: Do            



   not give IV            



   push.  (Same            



   as: Phenergan)            



               



               

 

 Dilaudid  0.5 mg, 0.25    Inactive       [place]



   mL, Route: IVP,            Medical



   Drug form: INJ,            Center



   ONCE, Dosing            



   Weight 127.027,            



   kg, Priority:            



   NOW, Start            



   date: 18            



   17:40:00 CDT,            



   Stop date:            



   18            



   17:40:00            



   CDTNotes: Same            



   as Dilaudid            



               



               

 

 Tramadol  100 mg, 2 tab,    No Longer        [facility]



   Route: PO, Drug    Active        Medical



   form: TAB,            Center



   Q6Hnow, Dosing            



   Weight 127.027,            



   kg, Start date:            



   18            



   17:00:00 CDT,            



   Duration: 30            



   day, Stop date:            



   18            



   11:00:00            



   CDTNotes: Not            



   to exceed            



   400mg/day.            



   (Same As:            



   Ultram)            



               

 

 gabapentin  600 mg, 2 cap,    No Longer        [facility]



   Route: PO, Drug    Active        Medical



   form: CAP,            Center



   Q8Hnow, Dosing            



   Weight 127.027,            



   kg, Start date:            



   18            



   17:00:00 CDT,            



   Stop date:            



   18            



   9:00:00            



   CDTNotes: (Same            



   as: Neurontin)            



               



               

 

 Acetaminophen  1,000 mg, 2    No Longer        [facility]



   tab, Route: PO,    Active        Medical



   Drug form: TAB,            Center



   Q6Hnow, Dosing            



   Weight 127.027,            



   kg, Start date:            



   18            



   17:00:00 CDT,            



   Duration: 30            



   day, Stop date:            



   18            



   11:00:00            



   CDTNotes: Max            



   acetaminophen            



   4000 mg/day (4            



   gm/day).  (Same            



   as: Tylenol            



   Extra Strength)            



               



               

 

 Robaxin  500 mg, 1 tab,    No Longer        [facility]



   Route: PO, Drug    Active        Medical



   form: TAB, TID,            Center



   Dosing Weight            



   127.027, kg,            



   Start date:            



   18            



   17:00:00 CDT,            



   Duration: 30            



   day, Stop date:            



   18            



   13:00:00            



   CDTNotes: (Same            



   as:Robaxin)            



               



               

 

 Oxycodone  5 mg, 1 tab,    No Longer        [facility] [place]



 Hydrochloride 5  Route: PO, Drug    Active      2018  Medical



 MG Oral Tablet  form: TAB, Q4H,            Center



   Dosing Weight            



   127.027, kg,            



   PRN Pain Score            



   4-6, Start            



   date: 18            



   16:33:00 CDT,            



   Duration: 30            



   day, Stop date:            



   18            



   16:32:00            



   CDTNotes: (Same            



   as: Roxicodone)            



               



               

 

 Beneprotein 7  2 pkt, Route:    No Longer         [place]



 gm pkt  PO, Drug Form:    Active      2018  Medical



   PWDR, Dosing            Center



   Weight 127.027,            



   kg, BID-Before            



   Meals, Start            



   date: 18            



   16:30:00 CDT,            



   Duration: 30            



   day, Stop date:            



   18            



   7:30:00            



   CDTNotes: (Same            



   as:            



   Beneprotein)            



               



               

 

 Acetaminophen  1 tab, PO, TID,    No Longer         [place]



 325 MG /  0 Refill(s)    Active      2018  Medical



 Oxycodone              Center



 Hydrochloride              



 10 MG Oral              



 Tablet              



 [Percocet              



 10/325]              



               

 

 Dilaudid  0.5 mg, 0.25    Inactive         [place]



   mL, Route: IVP,            Medical



   Drug form: INJ,            Center



   ONCE, Start            



   date: 18            



   11:01:00 CDT,            



   Stop date:            



   18            



   11:01:00 CDT            



               



               

 

 Phenergan  25 mg, 1 tab,    Inactive        [facility]



   Route: PO, Drug          2018  Medical



   form: TAB, Q6H,            Center



   Dosing Weight            



   127.027, kg,            



   PRN Nausea &            



   Vomiting, Start            



   date: 18            



   10:43:00 CDT,            



   Duration: 30            



   day, Stop date:            



   18            



   10:42:00            



   CDTNotes: (Same            



   as: Phenergan)            



               



               

 

 Dilaudid  2 mg, Route:    Inactive         [place]



   IVP, ONCE,          2018  Medical



   Dosing Weight            Center



   127.027, kg,            



   Priority: STAT,            



   Start date:            



   18            



   10:43:00 CDT,            



   Stop date:            



   18            



   10:43:00 CDT            



               



               

 

 Docusate  100 mg, 1 cap,    No Longer        [facility]



   Route: PO, Drug    Active      2018  Medical



   form: CAP, BID,            Center



   Dosing Weight            



   127.27, kg,            



   Start date:            



   18            



   9:00:00 CDT,            



   Duration: 30            



   day, Stop date:            



   18            



   17:00:00            



   CDTNotes: (Same            



   as: Colace) (Do            



   Not Crush)            



               



               

 

 Zinc Sulfate  220 mg, 1 cap,    No Longer        [facility]



   Route: PO, Drug    Active      2018  Medical



   form: CAP,            Center



   Daily, Dosing            



   Weight 127.27,            



   kg, Start date:            



   18            



   9:00:00 CDT,            



   Duration: 30            



   day, Stop date:            



   18            



   9:00:00            



   CDTNotes: (Zinc            



   sulfate            



   capsule) - 220            



   mg Zinc            



   sulfate=50 mg            



   elemental zinc            



   Same as Zinc            



   Sulfate            



               

 

 ascorbic acid  500 mg, 1 tab,    No Longer        [facility]



   Route: PO, Drug    Active        Medical



   form: TAB, BID,            Center



   Dosing Weight            



   127.27, kg,            



   Start date:            



   18            



   9:00:00 CDT,            



   Duration: 30            



   day, Stop date:            



   18            



   17:00:00            



   CDTNotes: (Same            



   as: Vitamin C)            



               



               

 

 multivitamin  1 tab, Route:    No Longer        [facility] [place]



   PO, Drug Form:    Active      2018  Medical



   TAB, Dosing            Center



   Weight 127.27,            



   kg, Daily,            



   Start date:            



   18            



   9:00:00 CDT,            



   Duration: 30            



   day, Stop date:            



   18            



   9:00:00            



   CDTNotes: (Same            



   as:Thera)            



   WASTE: F/P -            



   Black; E -            



   Municipal Trash            



   Bin  Take with            



   food.            



               

 

 Naproxen  500 mg, 1 tab,    Inactive       [place]



   Route: PO, Drug            Medical



   form: TAB,            Center



   [id number], Dosing            



   Weight 127.27,            



   kg, Start date:            



   18            



   2:00:00 CDT,            



   Duration: 30            



   day, Stop date:            



   18            



   14:00:00            



   CDTNotes: (Same            



   as: Naprosyn)            



   Take with food.            



               



               

 

 Zosyn  3.375 gm,    No Longer        [facility]



   Route: IVPB,    Active      2018  Medical



   Drug form:            Center



   PDR/INJ,            



   ABXQ8H, Dosing            



   Weight 127.27,            



   kg, Start date:            



   18            



   2:00:00 CDT,            



   Stop date:            



   05/10/18            



   10:00:00 CDT,            



   ABX Indication:            



   Skin/Soft            



   Tissue            



   InfectionNotes:            



   (Same as:            



   Zosyn) Dosing            



   based on            



   Piperacillin            



   component   ***            



   MEDICATION            



   WASTE ***            



   Product Size:            



   3375 mg Product            



   Wasted:  ___ mg            



               



               

 

 Vancomycin  1,000 mg,    No Longer         [place]



   Route: IVPB,    Active        Medical



   Drug form: INJ,            Center



   ABXQ8H, Dosing            



   Weight 127.27,            



   kg, Start date:            



   18            



   2:00:00 CDT,            



   Duration: 7            



   day, Stop date:            



   05/10/18            



   18:00:00 CDT,            



   ABX Indication:            



   Skin/Soft            



   Tissue            



   InfectionNotes:            



   TIME CRITICAL            



   MEDICATION            



   (Same As:            



   Vancocin)            



   Infusion rate            



   2001 mg: infuse            



   over 2.5 hours            



   For adult            



   patients only:            



   Round to            



   nearest 250 mg            



   per Medical            



   Staff approval            



    *** MEDICATION            



   WASTE ***            



   Product Size:            



   1000 mg Product            



   Wasted:  ___ mg            



               



               

 

 Enoxaparin  40 mg, 0.4 mL,    No Longer         [place]



   Route: SUB-Q,    Active        Medical



   Drug form: INJ,            Center



   [id number],            



   Dosing Weight            



   127.27, kg,            



   Consider for            



   obese patients,            



   Start date:            



   18            



   2:00:00 CDT,            



   Stop date:            



   18            



   14:00:00            



   CDTNotes: (Same            



   as: Lovenox)            



               



               

 

 Sodium Chloride  1,000 mL, Rate:    No Longer         [place]



 0.9% IV 1,000  125 ml/hr,    Active        Medical



 mL  Infuse over: 8            Center



   hr, Route: IV,            



   Dosing Weight            



   127.27 kg,            



   Total Volume:            



   1,000, Start            



   date: 18            



   1:46:00 CDT,            



   Duration: 30            



   day, Stop date:            



   18            



   1:45:00 CDT,            



   2.44, m2            



               

 

 Saline Flush  10 ml, Route:    No Longer         [place]



 0.9%  IVP, Drug Form:    Active        Medical



   INJ, Dosing            Center



   Weight 127.27,            



   kg, PRN, PRN            



   Line Flush,            



   Start date:            



   18            



   1:46:00 CDT,            



   Duration: 30            



   day, Stop date:            



   18            



   1:45:00            



   CDTNotes: (Same            



   as: BD            



   Posiflush)            



               



               

 

 Acetaminophen  325 mg, 1 tab,    Inactive         [place]



   Route: PO, Drug          2018  Medical



   form: TAB, Q4H,            Center



   Dosing Weight            



   127.27, kg, PRN            



   Pain Score 4-6,            



   Start date:            



   18            



   1:46:00 CDT,            



   Duration: 30            



   day, Stop date:            



   18            



   1:45:00            



   CDTNotes: Do            



   not exceed 4            



   gm/day.  (Same            



   as: Tylenol)            



               



               

 

 Acetaminophen  1 tab, Route:    Inactive         [place]



 325 MG /  PO, Drug Form:          2018  Medical



 Hydrocodone  TAB, Dosing            Center



 Bitartrate 5 MG  Weight 127.27,            



 Oral Tablet  kg, Q4H, PRN            



   Pain Score 4-6,            



   Start date:            



   18            



   1:46:00 CDT,            



   Duration: 30            



   day, Stop date:            



   18            



   1:45:00            



   CDTNotes: (Same            



   as: Norco            



   325/5)  Do not            



   exceed 4gm/day            



   of            



   acetaminophen.            



               



               

 

 Reglan  10 mg, 2 mL,    Inactive         [place]



   Route: IVP,            Medical



   Drug form: INJ,            Center



   ONCE, Dosing            



   Weight 127.273,            



   kg, Priority:            



   STAT, Start            



   date: 18            



   23:27:00 CDT,            



   Stop date:            



   18            



   23:27:00            



   CDTNotes: (Same            



   as: Reglan)            



               



               

 

 Benadryl  25 mg, 0.5 mL,    Inactive         [place]



   Route: IVP,          2018  Medical



   Drug form: INJ,            Center



   ONCE, Dosing            



   Weight 127.273,            



   kg, Priority:            



   STAT, Start            



   date: 18            



   23:27:00 CDT,            



   Stop date:            



   18            



   23:27:00            



   CDTNotes: (Same            



   as: Benadryl)            



               



               

 

 Magnesium  2 gm, 50 mL,    Inactive        [facility]



 Sulfate  Route: IV, Drug            Medical



   form: INJ,            Center



   ONCE, Dosing            



   Weight 127.273,            



   kg, Priority:            



   STAT, Start            



   date: 18            



   23:26:00 CDT,            



   Stop date:            



   18            



   23:26:00            



   CDTNotes:            



   WASTE: F/P -            



   Sink; E -            



   Municipal Trash            



   Bin            



               

 

 Sodium Chloride  1,000 mL, 1000    Inactive        [facility] [place]



 0.9% (Bolus) IV  ml/hr, Infuse          2018  Medical



   Over: 1 hr,            Center



   Route: IV,            



   1,000, Drug            



   form: INJ,            



   ONCE, Priority:            



   STAT, Dosing            



   Weight 127.273            



   kg, Start date:            



   18            



   23:26:00 CDT,            



   Stop date:            



   18            



   23:26:00 CDT            



               



               

 

 Zosyn  4.5 gm, Route:    Inactive         [place]



   IVPB, ONCE,          2018  Medical



   Dosing Weight            Center



   127.273, kg,            



   Priority: STAT,            



   Start date:            



   18            



   23:10:00 CDT,            



   Stop date:            



   18            



   23:10:00 CDT,            



   ABX Indication:            



   Bacteremia            



               



               

 

 Vancomycin  2,000 mg,    Inactive         [place]



   Route: IVPB,          2018  [facility]



   ONCE, Dosing            Center



   Weight 127.273,            



   kg, Priority:            



   STAT, Start            



   date: 18            



   23:10:00 CDT,            



   Stop date:            



   18            



   23:10:00 CDT,            



   ABX Indication:            



   BacteremiaNotes            



   : TIME CRITICAL            



   MEDICATION            



   (Same As:            



   Vancocin)            



   Infusion rate            



   2001 mg: infuse            



   over 2.5 hours            



   For adult            



   patients only:            



   Round to            



   nearest 250 mg            



   per Medical            



   Staff approval            



    *** MEDICATION            



   WASTE ***            



   Product Size:            



   1000 mg Product            



   Wasted:  ___ mg            



               



               

 

 normal saline  1,000 mL, Rate:    No Longer       [place]



 0.9% IV 1,000  75 ml/hr,    Active      2018  Medical



 mL  Infuse over:            Center



   13.3 hr, Route:            



   IV, Dosing            



   Weight 127.273            



   kg, Total            



   Volume: 1,000,            



   Start date:            



   18            



   22:39:00 CDT,            



   Duration: 30            



   day, Stop date:            



   18            



   22:38:00 CDT,            



   2.36, m2            



               

 

 Acetaminophen  1,000 mg, 2    Inactive         [place]



   tab, Route: PO,          2018  Medical



   Drug form: TAB,            Center



   ONCE, Dosing            



   Weight 127.273,            



   kg, Start date:            



   18            



   21:56:00 CDT,            



   Stop date:            



   18            



   21:56:00            



   CDTNotes: Max            



   acetaminophen            



   4000 mg/day (4            



   gm/day).  (Same            



   as: Tylenol            



   Extra Strength)            



               



               

 

 Rocephin  1 gm, Route:    Inactive      05/04[facility]



   IVP, Drug form:          2018  Medical



   PDR/INJ, ONCE,            Center



   Dosing Weight            



   127.273, kg,            



   Priority: STAT,            



   Start date:            



   18            



   21:21:00 CDT,            



   Stop date:            



   18            



   21:21:00 CDT,            



   ABX Indication:            



   Urinary Tract            



   InfectionNotes:            



   (Same As:            



   Rocephin).            



   *** MEDICATION            



   WASTE ***            



   Product Size:            



   1000 mg Product            



   Wasted:  _0__            



   mg            



               

 

 Morphine  4 mg, Route:    Inactive      [facility]



   IVP, ONCE,          2018  Medical



   Dosing Weight            Center



   127.273, kg,            



   Priority: STAT,            



   Start date:            



   18            



   19:05:00 CDT,            



   Stop date:            



   18            



   19:05:00 CDT            



               



               

 

 Benadryl  25 mg, 0.5 mL,    Inactive      [facility]



   Route: IVP,            Medical



   Drug form: INJ,            Center



   ONCE, Dosing            



   Weight 127.273,            



   kg, Priority:            



   STAT, Start            



   date: 18            



   18:14:00 CDT,            



   Stop date:            



   18            



   18:14:00            



   CDTNotes: (Same            



   as: Benadryl)            



               



               

 

 Benadryl  50 mg, 2 cap,    Inactive      [facility]



   Route: PO, Drug            Medical



   form: CAP,            Center



   ONCE, Dosing            



   Weight 127.273,            



   kg, Priority:            



   STAT, Start            



   date: 18            



   17:56:00 CDT,            



   Stop date:            



   18            



   17:56:00            



   CDTNotes: (Same            



   as: Benadryl)            



               



               

 

 Morphine  4 mg, 1 mL,    Inactive      [facility]



   Route: IVP,            Medical



   Drug form:            Center



   SOLN, ONCE,            



   Dosing Weight            



   127.273, kg,            



   Priority: STAT,            



   Start date:            



   18            



   17:56:00 CDT,            



   Stop date:            



   18            



   17:56:00 CDT            



               



               







Allergies, Adverse Reactions, Alerts







 Substance  Category  Reaction  Severity  Reaction  Status  Date  Comments  
Source



         type    Reported    



                 



                 



                 

 

 amoxicillin  Assertion      Drug  Active      [facility]



                 



                 

 

 morphine  Assertion      Drug  Active      [facility]



                 



                 

 

 Toradol  Assertion      Drug  Active      [facility]



                 



                 

 

 Minocin  Assertion      Drug  Active      [facility]



                 



                 

 

 Zofran  Assertion      Drug  Active      [facility]



                 



                 

 

 Levaquin  Assertion      Drug  Active      [facility]



                 



                 

 

 Bactrim  Assertion      Drug  Active      [facility]



                 



                 







Immunizations







 Immunization  Date Given  Site  Status  Last Updated  Comments  Source



             



             







Results







 Order Name  Results  Value  Reference  Date  Interpretation  Comments  Source



       Range        



               



               



               

 

 CHEM PANEL  B/C Ratio  17  6 - 25        [address]



               



               



               

 

 CHEM PANEL  Globulin  4.3 g/dL  2.7 - 4.2        [address]



               



               



               

 

 CHEM PANEL  A/G Ratio  0.7  0.7 - 1.6        [address]



               



               



               

 

 CHEM PANEL  AGAP  14.4 meq/L  10.0 -        [facility]



       20.0        [facility]



               



               



               

 

 CHEM PANEL  eGFR  113        Result Comment: The eGFR is calculated using 
the CKD-EPI formula. In most young, healthy individuals the eGFR will be >90 mL/
min/1.73m2. The eGFR declines with age. An eGFR of 60-89 may be normal in  [facility] 
[place]



     mL/min/1.    some populations, particularly the elderly, for 
whom the CKD-EPI formula has not been extensively validated. Use of the eGFR is 
not recommended in the following populations:  [address]



             Individuals with unstable creatinine concentrations, including 
pregnant patients and those with serious co-morbid conditions.  



               



             Patients with extremes in muscle mass or diet.  



               



             The data above are obtained from the National Kidney Disease 
Education Program (NKDEP) which additionally recommends that when the eGFR is 
used in patients with extremes of body mass index for purposes  



             of drug dosing, the eGFR should be multiplied by the estimated 
BMI.  

 

 CHEM PANEL  Alk Phos  76 unit/L  39 - 136        [address]



               



               



               

 

 CHEM PANEL  ALT  35 unit/L  0 - 65        [address]



               



               



               

 

 CHEM PANEL  Albumin Lvl  2.8 g/dL  3.5 - 5.0        [address]



               



               



               

 

 CHEM PANEL  Total Protein  7.1 g/dL  6.4 - 8.4        [address]



               



               



               

 

 CHEM PANEL  Calcium Lvl  8.7 mg/dL  8.5 - 10.5        [address]



               



               



               

 

 CHEM PANEL  AST  18 unit/L  0 - 37        [address]



               



               



               

 

 CHEM PANEL  Bili Total  0.3 mg/dL  0.2 - 1.3        [address]



               



               



               

 

 CHEM PANEL  Potassium Lvl  4.4 meq/L  3.5 - 5.1        [address]



               



               



               

 

 CHEM PANEL  Chloride Lvl  109 meq/L  95 - 109  05      [address]



               



               



               

 

 CHEM PANEL  CO2  23 meq/L  24 - 32  05      [address]



               



               



               

 

 CHEM PANEL  Glucose Lvl  114 mg/dL  70 - 99  05      [address]



               



               



               

 

 CHEM PANEL  Creatinine  1.01 mg/dL  0.50 -        [facility]



   Lvl    1.40  /      [facility]



               



               



               

 

 CHEM PANEL  BUN  17 mg/dL  7 - 22        [address]



               



               



               

 

 CHEM PANEL  Sodium Lvl  142 meq/L  135 - 145  05      [address]



               



               



               

 

 HEMATOLOGY  Basophils  0.6 %  0.0 - 1.0        [address]



               



               



               

 

 HEMATOLOGY  Segs-Bands #  4.8 K/CMM  1.5 - 8.1        [address]



               



               



               

 

 HEMATOLOGY  Monocytes #  0.7 K/CMM  0.0 - 0.8        [address]



               



               



               

 

 HEMATOLOGY  Lymphocytes #  1.9 K/CMM  1.0 - 5.5        [address]



               



               



               

 

 HEMATOLOGY  Monocytes  9.4 %  2.0 - 12.0        [address]



               



               



               

 

 HEMATOLOGY  Eosinophils #  0.2 K/CMM  0.0 - 0.5        [address]



               



               



               

 

 HEMATOLOGY  Eosinophils  2.9 %  0.0 - 4.0        [address]



               



               



               

 

 HEMATOLOGY  Segs  62.2 %  45.0 -        [facility]



       75.0        [facility]



               



               



               

 

 HEMATOLOGY  Lymphocytes  24.9 %  20.0 -        [facility]



       40.0        [facility]



               



               



               

 

 HEMATOLOGY  MCH  27.5 pg  27.0 -        [facility]



       31.0        [facility]



               



               



               

 

 HEMATOLOGY  MCV  85.3 fL  80.0 -        [facility]



       94.0        [facility]



               



               



               

 

 HEMATOLOGY  Hct  43.9 %  42.0 -  05      [facility]



       54.0        [facility]



               



               



               

 

 HEMATOLOGY  Hgb  14.2 g/dL  14.0 -        [facility]



       18.0        [facility]



               



               



               

 

 HEMATOLOGY  WBC  7.7 K/CMM  3.7 - 10.4  05      [address]



               



               



               

 

 HEMATOLOGY  RBC  5.15 M/CMM  4.70 -  05       [place]



       6.10        [facility]



               



               



               

 

 HEMATOLOGY  MPV  8.4 fL  7.4 - 10.4         [place]



         /2018      [facility]



               



               



               

 

 HEMATOLOGY  MCHC  32.3 g/dL  32.0 -        [facility]



       36.0        [facility]



               



               



               

 

 HEMATOLOGY  RDW  17.3 %  11.5 -        [facility]



       14.5        [facility]



               



               



               

 

 HEMATOLOGY  Platelet  317 K/CMM  133 - 450         [place]



         [address]



               



               



               

 

 CHEM PANEL  Globulin  4.4 g/dL  2.7 - 4.2         [place]



         [address]



               



               



               

 

 CHEM PANEL  A/G Ratio  0.6  0.7 - 1.6         [place]



         /[address] [place] [address]



               



               



               

 

 CHEM PANEL  B/C Ratio  17  6 - 25         [place]



               [facility]



               



               



               

 

 CHEM PANEL  AGAP  11.3 meq/L  10.0 -        [facility]



       20.      [facility]



               



               



               

 

 CHEM PANEL  eGFR  134        Result Comment: The eGFR is calculated using 
the CKD-EPI formula. In most young, healthy individuals the eGFR will be >90 mL/
min/1.73m2. The eGFR declines with age. An eGFR of 60-89 may be normal in  [facility] 
[place]



     mL/min/1.    some populations, particularly the elderly, for 
whom the CKD-EPI formula has not been extensively validated. Use of the eGFR is 
not recommended in the following populations:  [address]



             Individuals with unstable creatinine concentrations, including 
pregnant patients and those with serious co-morbid conditions.  



               



             Patients with extremes in muscle mass or diet.  



               



             The data above are obtained from the National Kidney Disease 
Education Program (NKDEP) which additionally recommends that when the eGFR is 
used in patients with extremes of body mass index for purposes  



             of drug dosing, the eGFR should be multiplied by the estimated 
BMI.  

 

 CHEM PANEL  Creatinine  0.84 mg/dL  0.50 -        [facility]



   Lvl    1.40        [facility]



               



               



               

 

 CHEM PANEL  Sodium Lvl  142 meq/L  135 - 145         [place]



         /2018      [facility]



               



               



               

 

 CHEM PANEL  Glucose Lvl  99 mg/dL  70 - 99        [address]



               



               



               

 

 CHEM PANEL  BUN  14 mg/dL  7 - 22        [address]



               



               



               

 

 CHEM PANEL  Alk Phos  79 unit/L  39 - 136        [address]



               



               



               

 

 CHEM PANEL  Bili Total  0.3 mg/dL  0.2 - 1.3        [facility]



         [address] [place] [address]



               



               



               

 

 CHEM PANEL  AST  14 unit/L  0 - 37         [place]



         /[address] [place] [address]



               



               



               

 

 CHEM PANEL  ALT  43 unit/L  0 - 65         [place]



         [address]



               



               



               

 

 CHEM PANEL  Total Protein  7.1 g/dL  6.4 - 8.4        [address]



               



               



               

 

 CHEM PANEL  Albumin Lvl  2.7 g/dL  3.5 - 5.0        [address]



               



               



               

 

 CHEM PANEL  Calcium Lvl  9.2 mg/dL  8.5 - 10.5        [address]



               



               



               

 

 CHEM PANEL  CO2  21 meq/L  24 - 32        [address]



               



               



               

 

 CHEM PANEL  Potassium Lvl  4.3 meq/L  3.5 - 5.1        [address]



               



               



               

 

 CHEM PANEL  Chloride Lvl  114 meq/L  95 - 109        [address]



               



               



               

 

 HEMATOLOGY  MCHC  32.5 g/dL  32.0 -        [facility]



       36.0        [facility]



               



               



               

 

 HEMATOLOGY  RDW  17.5 %  11.5 -        [facility]



       14.5        [facility]



               



               



               

 

 HEMATOLOGY  Platelet  400 K/CMM  133 - 450        [address]



               



               



               

 

 HEMATOLOGY  MPV  8.5 fL  7.4 - 10.4        [address]



               



               



               

 

 HEMATOLOGY  WBC  6.6 K/CMM  3.7 - 10.4        [address]



               



               



               

 

 HEMATOLOGY  RBC  5.17 M/CMM  4.70 -        [facility]



       6.10        [facility]



               



               



               

 

 HEMATOLOGY  MCV  86.1 fL  80.0 -        [facility]



       94.0        [facility]



               



               



               

 

 HEMATOLOGY  Hct  44.5 %  42.0 -         [place]



       54.0        [facility]



               



               



               

 

 HEMATOLOGY  MCH  28.0 pg  27.0 -        [facility]



       31.0        [facility]



               



               



               

 

 HEMATOLOGY  Hgb  14.5 g/dL  14.0 -        [facility]



       18.0        [facility]



               



               



               

 

 HEMATOLOGY  Lymphocytes #  1.7 K/CMM  1.0 - 5.5        [address]



               



               



               

 

 HEMATOLOGY  Monocytes #  0.7 K/CMM  0.0 - 0.8  05      [address]



               



               



               

 

 HEMATOLOGY  Eosinophils #  0.2 K/CMM  0.0 - 0.5        [address]



               



               



               

 

 HEMATOLOGY  Lymphocytes  26.1 %  20.0 -        [facility]



       40.0        [facility]



               



               



               

 

 HEMATOLOGY  Segs  59.3 %  45.0 -        [facility]



       75.0        [facility]



               



               



               

 

 HEMATOLOGY  Basophils  0.8 %  0.0 - 1.0        [address]



               



               



               

 

 HEMATOLOGY  Monocytes  10.5 %  2.0 - 12.0        [address]



               



               



               

 

 HEMATOLOGY  Eosinophils  3.3 %  0.0 - 4.0        [address]



               



               



               

 

 HEMATOLOGY  Segs-Bands #  3.9 K/CMM  1.5 - 8.1        [address]



               



               



               

 

 CHEM PANEL  Glucose Lvl  74 mg/dL  70 - 99        [address]



               



               



               

 

 CHEM PANEL  BUN  12 mg/dL  7 - 22        [address]



               



               



               

 

 CHEM PANEL  Creatinine  0.75 mg/dL  0.50 -        [facility]



   Lvl    1.40  /      [facility]



               



               



               

 

 CHEM PANEL  eGFR  140        Result Comment: The eGFR is calculated using 
the CKD-EPI formula. In most young, healthy individuals the eGFR will be >90 mL/
min/1.73m2. The eGFR declines with age. An eGFR of 60-89 may be normal in  [facility] 
[place]



     mL/min/1.7        some populations, particularly the elderly, for 
whom the CKD-EPI formula has not been extensively validated. Use of the eGFR is 
not recommended in the following populations:  [address]



             Individuals with unstable creatinine concentrations, including 
pregnant patients and those with serious co-morbid conditions.  



               



             Patients with extremes in muscle mass or diet.  



               



             The data above are obtained from the National Kidney Disease 
Education Program (NKDEP) which additionally recommends that when the eGFR is 
used in patients with extremes of body mass index for purposes  



             of drug dosing, the eGFR should be multiplied by the estimated 
BMI.  

 

 CHEM PANEL  Albumin Lvl  2.9 g/dL  3.5 - 5.0        [address]



               



               



               

 

 CHEM PANEL  Globulin  4.4 g/dL  2.7 - 4.2        [address]



               



               



               

 

 CHEM PANEL  A/G Ratio  0.7  0.7 - 1.6        [address]



               



               



               

 

 CHEM PANEL  Bili Total  0.4 mg/dL  0.2 - 1.3        [address]



               



               



               

 

 CHEM PANEL  Alk Phos  71 unit/L  39 - 136        [address]



               



               



               

 

 CHEM PANEL  AST  15 unit/L  0 - 37  05      [facility]



         [address] [place] [address]



               



               



               

 

 CHEM PANEL  ALT  28 unit/L  0 - 65  05      [address]



               



               



               

 

 CHEM PANEL  Potassium Lvl  4.4 meq/L  3.5 - 5.1        [address]



               



               



               

 

 CHEM PANEL  Sodium Lvl  147 meq/L  135 - 145  05      [address]



               



               



               

 

 CHEM PANEL  CO2  25 meq/L  24 - 32  05      [address]



               



               



               

 

 CHEM PANEL  Chloride Lvl  114 meq/L  95 - 109  05      [address]



               



               



               

 

 CHEM PANEL  B/C Ratio  16  6 - 25        [address]



               



               



               

 

 CHEM PANEL  Calcium Lvl  8.7 mg/dL  8.5 - 10.5        [address]



               



               



               

 

 CHEM PANEL  AGAP  12.4 meq/L  10.0 -        [facility]



       20.0        [facility]



               



               



               

 

 CHEM PANEL  Total Protein  7.3 g/dL  6.4 - 8.4        [address]



               



               



               

 

 HEMATOLOGY  Platelet  345 K/CMM  133 - 450  05      [facility]



         [address] [place] [address]



               



               



               

 

 HEMATOLOGY  MPV  8.7 fL  7.4 - 10.4        [address]



               



               



               

 

 HEMATOLOGY  WBC  6.9 K/CMM  3.7 - 10.4        [address]



               



               



               

 

 HEMATOLOGY  RBC  5.30 M/CMM  4.70 -        [facility]



       6.10        [facility]



               



               



               

 

 HEMATOLOGY  MCHC  32.8 g/dL  32.0 -         [place]



       36.0        [facility]



               



               



               

 

 HEMATOLOGY  MCH  27.9 pg  27.0 -         [place]



       31.0        [facility]



               



               



               

 

 HEMATOLOGY  Hgb  14.8 g/dL  14.0 -        [facility]



       18.0        [facility]



               



               



               

 

 HEMATOLOGY  MCV  85.0 fL  80.0 -        [facility]



       94.0        [facility]



               



               



               

 

 HEMATOLOGY  Hct  45.1 %  42.0 -        [facility]



       54.0        [facility]



               



               



               

 

 HEMATOLOGY  RDW  17.5 %  11.5 -        [facility]



       14.5        [facility]



               



               



               

 

 HEMATOLOGY  Eosinophils #  0.2 K/CMM  0.0 - 0.5  05      [facility]



         [address] [place] [address]



               



               



               

 

 HEMATOLOGY  Lymphocytes #  2.0 K/CMM  1.0 - 5.5  05/06      [address]



               



               



               

 

 HEMATOLOGY  Monocytes #  0.7 K/CMM  0.0 - 0.8        [address]



               



               



               

 

 HEMATOLOGY  Eosinophils  2.4 %  0.0 - 4.0  05      [address]



               



               



               

 

 HEMATOLOGY  Basophils  0.6 %  0.0 - 1.0        [address]



               



               



               

 

 HEMATOLOGY  Segs-Bands #  4.0 K/CMM  1.5 - 8.1        [address]



               



               



               

 

 HEMATOLOGY  Monocytes  10.4 %  2.0 - 12.0        [address]



               



               



               

 

 HEMATOLOGY  RBC Morph  Normal          [facility]



               [facility]



     (18 2:07 AM)          [place]



               



               



               

 

 HEMATOLOGY  Segs  58.1 %  45.0 -        [facility]



       75.0        [facility]



               



               



               

 

 HEMATOLOGY  Plt Morph  Normal          [address]



     (18 2:07 AM)          [place]



               



               



               

 

 HEMATOLOGY  Lymphocytes  28.5 %  20.0 -        [facility]



       40.0        [facility]



               



               



               

 

 HEMATOLOGY  Basophils #  0.1 K/CMM  0.0 - 0.2        [address]



               



               



               

 

 HEMATOLOGY  Polychrom  Moderate  None Seen         [place]



         /2018      [facility]



     *ABN*          Center



               



     (18 11:07 AM)          



               

 

 CHEM PANEL  Magnesium Lvl  2.3 mg/dL  1.8 - 2.4        [address]



               



               



               

 

 CHEM PANEL  Phosphorus  3.5 mg/dL  2.5 - 4.5        [address]



               



               



               

 

 HEMATOLOGY  PT  14.0 s  12.0 -        [facility]



       14.7        [facility]



               



               



               

 

 HEMATOLOGY  PTT  37.6 s  22.9 -        [facility]



       35.8        [facility]



               



               



               

 

 HEMATOLOGY  INR  1.08  0.85 -         [place]



       1.17        [facility]



               



               



               

 

 IMMUNOLOGY  C-REACTIVE  13.1 mg/L  <=2.9 mg/L        [facility]



   PROTEIN            [facility]



               



               



               

 

 IMMUNOLOGY  Prealbumin  25.2 mg/dL  18.0 -  05/       [place]



       45.0        [facility]



               



               



               

 

 CHEM PANEL  Lactic Acid  0.9 mMol/L  0.5 - 2.2        [facility]



   Lvl      /      [facility]



               



               



               

 

 HEMATOLOGY  Sed Rate  5 mm/h  0 - 15        [address]



               



               



               

 

 IMMUNOLOGY  C-REACTIVE  15.8 mg/L  <=2.9 mg/L  05      [facility]



   PROTEIN      /      [facility]



               



               



               

 

 HEMATOLOGY  PT  13.0 s  12.0 -         [place]



       14.7  /      [facility]



               



               



               

 

 HEMATOLOGY  INR  0.98  0.85 -         [place]



       1.17        [facility]



               



               



               

 

 HEMATOLOGY  PTT  33.2 s  22.9 -         [place]



       35.8        [facility]



               



               



               

 

 BLOOD BANK  Antibody Scrn  Negative          [facility]



 RESULTS              [facility]



     (5/3/18 6:51 PM)          [place]



               



               



               

 

 BLOOD BANK  ABO/Rh  AB POS          [facility]



 RESULTS              [facility]



               



               



               

 

 URINE AND  UA  0.2 EU/dL  0.1 - 1.0        [facility]  Urobilinogen      /      [facility]



               



               



               

 

 URINE AND  UA Nitrite  Negative  Negative        [facility]              [facility]



     (5/3/18 6:35 PM)          [place]



               



               



               

 

 URINE AND  UA Glucose  Negative  Negative        [facility]              [facility]



     (5/3/18 6:35 PM)          [place]



               



               



               

 

 URINE AND  UA Ketones  Negative  Negative        [facility]



 STOOL        /2018      Medical



     *NA*          [place]



               



     (5/3/18 6:35 PM)          



               

 

 URINE AND  UA Bili  Negative  Negative        [facility]



 STOOL        /2018      Medical



     *NA*          [place]



               



     (5/3/18 6:35 PM)          



               

 

 URINE AND  UA Blood  Trace  Negative        [facility]



 STOOL        /2018      Medical



     *ABN*          [place]



               



     (5/3/18 6:35 PM)          



               

 

 URINE AND  UA Leuk Est  Small  Negative        [facility]



 STOOL        /2018      Medical



     *ABN*          [place]



               



     (5/3/18 6:35 PM)          



               

 

 URINE AND  UA Spec Grav  1.020  <=1.030        [facility]      [facility]



               



               



               

 

 URINE AND  UA pH  6.0  5.0 - 8.0        [facility]



 STOOL        [address]



               



               



               

 

 URINE AND  UA Color  Yellow  Yellow        [facility]



 STOOL        /2018      Medical



     *NA*          [place]



               



     (5/3/18 6:35 PM)          



               

 

 URINE AND  UA Protein  Trace  Negative        [facility]



 STOOL        /2018      Medical



     *ABN*          [place]



               



     (5/3/18 6:35 PM)          



               

 

 URINE AND  UA Turbidity  Slight Cloudy  Clear        [facility]



 STOOL              [facility]



     (5/3/18 6:35 PM)          [place]



               



               



               

 

 URINE AND  UA Hyal Cast  0-2  0 - 2        [facility]



 STOOL              [facility]



     (5/3/18 6:35 PM)          [place]



               



               



               

 

 URINE AND  UA Bacteria  Occasional  None Seen        [facility]



 STOOL    /HPF  /HPF  /      [facility]



               



               



               

 

 URINE AND  UA RBC  11-20 /HPF  0 - 2        [address]



               



               



               

 

 URINE AND  UA Sq Epi  Occasional  Few /LPF        [facility]



 STOOL    /LPF    /[address] [place] [address]



               



               



               

 

 URINE AND  UA WBC    None Seen        [facility]



 STOOL    /HPF  /HPF  /      [facility]



               



               



               

 

 HEMATOLOGY  Basophils #  0.1 K/CMM  0.0 - 0.2        [address]



               



               



               

 

 HEMATOLOGY  Polychrom  Slight          [address]



               



               



               

 

 HEMATOLOGY  Plt Morph  Normal          [facility]      [facility]



     (5/3/18 6:20 PM)          [place]



               



               



               

 

 Brain shunt  Brain shunt  EXAM: SKULL 2 VIEWS        -  [facility] [place]



 series DX  series DX          -  Medical



     EXAM: CHEST 2 VIEWS        This report was dictated by a Radiology Resident
/Fellow.  I have personally reviewed the images as  Center



             well as the Resident's interpretation and agree with the findings.
  



     EXAM: ABDOMEN 2 VIEWS        Read by:  Bernardo Lorenz MD        
  Resident:  Bernardo Lorenz MD  



             Dictated Date/time:  18 20:33  



             Electronically Signed by:  Martin Phillips MD                      
   18 22:11  



             FINAL REPORT  



     DATE: 5/3/2018 7:20 PM CDT          



               



               



               



     INDICATION: Headache. - Please include Codman view for shunt setting.     
     



               



               



               



     COMPARISON: Brain shuntogram 2013          



               



               



               



     TECHNIQUE: AP and lateral views of the skull, chest and abdomen.          



               



               



               



     FINDINGS:          



               



     There is a single intact shunt tube with the proximal aspect in the right 
frontal calvarium coursing across midline to the left anterior chest and 
abdomen with the tip coiled within the pelvis.          



               



               



               



     A right parietal shunt with distal tip in the right lateral abdomen is 
discontinuous. Another shunt present with proximal tip in the right neck base 
and distal tip in the medial mid abdomen          



               



     Cholecystectomy clips are noted. The lungs are clear but underinflated. 
Appearance of the pelvis is unchanged with bilateral shallow acetabular roofs. 
No obvious posterior dislocation. Spinal dysraphism          



      is seen with absence of the spinous process from L3 to S1.          



               



               



               



     IMPRESSION:          



               



               



               



     1. No significant change. The right transfrontal shunt catheter is intact 
along its course with the distal tip in the pelvis.          



               



     2. Discontinuous right parieto-occipital catheter and 2 discontinuous 
catheters in the right chest and abdomen are unchanged.          



               



     3. Spinal dysraphism.          



               



               



               



               

 

 Brain wo  Brain wo  EXAM: CT BRAIN WITHOUT CONTRAST        -  [facility]



 contrast CT  contrast CT      /    -  Medical



             This report was dictated by a Radiology Resident/Fellow.  I have 
personally reviewed the images as  Center



             well as the Resident's interpretation and agree with the findings.
  



     DATE: 5/3/2018 627 PM CDT        Read by:  Bernardo Lorenz MD    
      Resident:  Bernardo Lorenz MD  



             Dictated Date/time:  18 18:45  



             Electronically Signed by:  Martin Phillips MD                      
   18 21:12  



             FINAL REPORT  



     INDICATION: 32-year-old male patient with history of  shunt malfunction 
         



               



               



               



     COMPARISON: CT of the brain 2015, 2013.          



               



               



               



     TECHNIQUE: Axial CT images of the brain were obtained. Sagittal and 
coronal reformats.          



               



     IV contrast: None.          



               



               



               



     FINDINGS:          



               



     An orphaned right occipital approach  shunt is present. Also present is 
a right frontal approach  shunt with tip in the left lateral ventricle.      
    



               



     Narrowing of the lateral ventricles is present, unchanged from 2015. 
         



               



     There is mild uncal and cerebellar tonsillar herniation, also unchanged.  
        



               



     No recent hemorrhage or territorial infarct. No mass. No midline shift.   
       



               



     No scalp fluid collections.          



               



     Sinuses are clear.          



               



               



               



     IMPRESSION:          



               



     No acute intracranial abnormality.          



               



     Adequately decompressed ventricular system.          



               



               



               



               

 

 Chest 1view  Chest 1view  EXAM: XR CHEST 1 VIEW        -  [facility]



 DX  DX      /2018    -  Medical



             This report was dictated by a Radiology Resident/Fellow.  I have 
personally reviewed the images as  Center



             well as the Resident's interpretation and agree with the findings.
  



     DATE: 5/3/2018 6:12 PM CDT        Read by:  Olvin Palacio MD          
       Resident:  Olvin Palacio MD  



             Dictated Date/time:  18 18:31  



             Electronically Signed by:  Bakari Interiano MD              
   18 19:29  



             FINAL REPORT  



     INDICATION:  - picc line use          



               



               



               



     UT SECTION: ER          



               



               



               



     COMPARISON: None.          



               



               



               



     TECHNIQUE: AP chest          



               



               



               



     FINDINGS:          



               



     Lines, tubes and hardware:          



               



     Left PICC tip at mid SVC.          



               



               



               



     Lungs and pleura: No pulmonary or pleural based abnormality is identified. 
Pulmonary vascularity is normal.          



               



               



               



     Heart and mediastinum: The heart size is normal for technique. The 
mediastinal contours are normal.          



               



               



               



     Bones: No acute bony abnormality is identified.          



               



               



               



     Upper abdomen: Normal.          



               



               



               



               



               



     IMPRESSION:          



               



     Left PICC tip at mid SVC.          



               



               



               



     No acute cardiopulmonary abnormality is observed.          



               



               



               



               







Vital Signs







 Vital Sign  Value  Date  Comments  Source



         

 

 Temperature Oral (F)  97.2 F  2018    [facility]



         

 

 Systolic (mm Hg)  127  2018    [facility]



         

 

 Diastolic (mm Hg)  85  2018    [facility]



         

 

 Heart Rate  81  2018    [facility]



         

 

 Respitory Rate  18  2018    [facility]



         

 

 Heart Rate  90  2018    [facility]



         

 

 Systolic (mm Hg)  106  2018    [facility]



         

 

 Diastolic (mm Hg)  71  2018    [facility]



         

 

 Respitory Rate  18  2018    [facility]



         

 

 Temperature Oral (F)  98.1 F  2018    [facility]



         

 

 Respitory Rate  18  2018    [facility]



         

 

 Systolic (mm Hg)  168  2018    [facility]



         

 

 Diastolic (mm Hg)  107  2018    [facility]



         

 

 Heart Rate  87  2018    [facility]



         

 

 Temperature Oral (F)  98.1 F  2018    [facility]



         

 

 Weight  127.027  2018    [facility]



         

 

 Weight  127.027  2018    [facility]



         

 

 Weight  127.027  2018    [facility]



         

 

 BMI Calculated  48.16  2018    [facility]



         

 

 Height  162.56 cm  2018    [facility]



         

 

 Height  149.86 cm  2018    [facility]



         

 

 BMI Calculated  56.67  2018    [facility]



         







Encounters







 Location  Location  Encounter  Encounter  Reason  Attending  ADM  DC  Status  
Source



   Details  Type  Number  For  Provider  Date  Date    



         Visit          



                   



                   



                   

 

 [facility]  [id number]    [place]      [facility]



 Jose Ulloah      [facility]



                   



                   



                   







Procedures







 Procedure  Code  Date  Perfomer  Comments  Source



           



           

 

 Cholecystectomy  [id number]        [facility]



           

 

 Shunt of cerebral  [id number]        [facility] [place]



 ventricle to          Medical



 extracranial site          Center

## 2018-10-11 NOTE — XMS REPORT
[facility]

 Created on:2018



Patient:Redd Dale

Sex:Male

:1985

External Reference #:[id number]





Demographics







 Address  [address]  [place] [address]

 

 Phone  [phone]

 

 Preferred Language  en

 

 Marital Status  Unknown

 

 Alevism Affiliation  Unknown

 

 Race  Black or 

 

 Ethnic Group  Not  or 









Author







 Organization  [employer]









Care Team Providers







 Name  Role  Phone

 

 Knox, Na  Provider Role  Unavailable









Allergies, Adverse Reactions, Alerts







 Substance  Reaction  Event Type

 

 Toradol  Info Not Available  Drug Allergy

 

 Sulfa  Info Not Available  Drug Allergy

 

 Zofran  Info Not Available  Drug Allergy

 

 Morphine Sulfate ER  Info Not Available  Drug Allergy

 

 Levaquin  Info Not Available  Drug Allergy







Problems







 Problem Type  Condition  Code  Onset Dates  Condition Status

 

 Problem  Nicotine dependence  F17.200    Active

 

 Problem  Lumbar spina bifida with  Q05.2    Active



   hydrocephalus      

 

 Problem  Dependence on wheelchair  Z99.3    Active

 

 Problem  Fecal incontinence  R15.9    Active

 

 Problem  Foot ulcer  L97.509    Active

 

 Problem  Depression with anxiety  F41.8    Active

 

 Problem  Paraplegia  G82.20    Active

 

 Problem  Constipation  K59.00    Active

 

 Problem  Incontinence of urine  R32    Active

 

 Problem  Nausea alone  R11.0    Active

 

 Assessment  Episodic tension-type headache, not  G44.219    Active



   intractable      

 

 Assessment   (ventriculoperitoneal) shunt  Z98.2    Active



   status      

 

 Assessment  Ulcer of left foot, unspecified  L97.529    Active



   ulcer stage      

 

 Assessment  Nonintractable episodic headache,  R51    Active



   unspecified headache type      

 

 Assessment  Depression with anxiety  F41.8    Active

 

 Problem  Episodic tension-type headache, not  G44.219    Active



   intractable      

 

 Assessment  Lumbar spina bifida with  Q05.2    Active



   hydrocephalus      

 

 Problem  Nonintractable episodic headache,  R51    Active



   unspecified headache type      

 

 Assessment  Nausea and vomiting, intractability  R11.2    Active



   of vomiting not specified,      



   unspecified vomiting type      

 

 Problem   (ventriculoperitoneal) shunt  Z98.2    Active



   status      







Medications







 Medication  Code  Code  Instructions  Start  End  Status  Dosage



   System      Date  Date    

 

 Tylenol with  NDC  48059974268  300-60 MG      Active  1 tablet as



 Codeine #4      Orally every 6        needed



       hrs        

 

 Macrobid  NDC  93993124228  100 MG Orally      Active  1 capsule



       every 12 hrs        with food

 

 Pantoprazole  NDC  [id number]  40 MG Orally      Active  1 tablet



 Sodium      Once a day        

 

 Collagenase  NDC  [address]-15  250 UNIT/GM      Active  1 application



       Externally        to affected



       Once a day        area







Results

No Known Results



Summary Purpose

[employer] Submission

## 2018-10-11 NOTE — ER
Nurse's Notes                                                                                     

 [facility]                                                                

Name: Redd Dale Jr                                                                            

Age: 32 yrs                                                                                       

Sex: Male                                                                                         

: 1985                                                                                   

MRN: [id number]                                                                                   

Arrival Date: 10/11/2018                                                                          

Time: 11:11                                                                                       

Account#: [id number]                                                                            

Bed 24                                                                                            

Private MD: Camelia Knox                                                                              

Diagnosis: Diarrhea, unspecified;Dehydration                                                      

                                                                                                  

Presentation:                                                                                     

10/11                                                                                             

11:17 Presenting complaint: Patient states: right sided abd pain and mid back pain x 1 day.   sv  

      c/o green diarrhea. Transition of care: patient was not received from another setting       

      of care. Onset of symptoms was October 10, 2018. Care prior to arrival: None.               

11:17 Method Of Arrival: Wheelchair                                                           sv  

11:17 Acuity: AYESHA 3                                                                           sv  

11:18 Note Pt stated that if he wasn't not brought back by 12, he had to leave because he has sv  

      something else he has to do and he would come back to the ER by EMS.                        

14:08 Risk Assessment: Do you want to hurt yourself or someone else? Patient reports no       em  

      desire to harm self or others.                                                              

15:00 Initial Sepsis Screen: Does the patient meet any 2 criteria? No. Patient's initial      kr2 

      sepsis screen is negative. Does the patient have a suspected source of infection? No.       

      Patient's initial sepsis screen is negative.                                                

                                                                                                  

Triage Assessment:                                                                                

11:17 General: Appears in no apparent distress. uncomfortable, obese, Behavior is calm,       sv  

      cooperative, appropriate for age. Pain: Complains of pain in back and abdomen Pain          

      currently is 9 out of 10 on a pain scale. EENT: No signs and/or symptoms were reported      

      regarding the EENT system. Neuro: Level of Consciousness is awake, alert, obeys             

      commands, Oriented to person, place, time, situation. Respiratory: Respiratory effort       

      is even, unlabored, Respiratory pattern is regular, symmetrical. GI: Reports upper          

      abdominal pain, diarrhea. Derm: Skin is pink, warm \T\ dry.                                 

                                                                                                  

Historical:                                                                                       

- Allergies:                                                                                      

11:18 Amoxicillin;                                                                            sv  

11:18 Bactrim;                                                                                sv  

11:18 Ciprofloxacin;                                                                          sv  

11:18 CLAVULANIC ACID;                                                                        sv  

11:18 Doxycycline;                                                                            sv  

11:18 Levofloxacin;                                                                           sv  

11:18 Morphine;                                                                               sv  

11:18 Toradol;                                                                                sv  

11:18 TRIMETHOPRIM;                                                                           sv  

11:18 Vancomycin;                                                                             sv  

11:18 Zofran;                                                                                 sv  

- PMHx:                                                                                           

11:18 Asthma; Cerebral Palsy; cluster headaches; decubitus ulcers on feet; GERD;              sv  

      Hydrocephalus; Hypertension; spina bifida;                                                  

                                                                                                  

- Immunization history:: Flu vaccine is up to date.                                               

- Ebola Screening: : No symptoms or risks identified at this time.                                

- Social history:: Smoking status: Patient/guardian denies using tobacco.                         

                                                                                                  

                                                                                                  

Screenin:14 Abuse screen: Denies threats or abuse. Nutritional screening: No deficits noted.        em  

      Tuberculosis screening: No symptoms or risk factors identified. Fall Risk Ambulatory        

      Aid- None/Bed Rest/Nurse Assist (0 pts). Gait- Normal/Bed Rest/Wheelchair (0 pts).          

                                                                                                  

Assessment:                                                                                       

13:16 General: Appears in no apparent distress. comfortable, Behavior is calm, cooperative,   em  

      Denies fever. Pain: Complains of pain in abdomen and back Pain currently is 9 out of 10     

      on a pain scale. Neuro: Level of Consciousness is awake, alert, obeys commands,             

      Oriented to person, place, time, situation. Cardiovascular: Capillary refill < 3            

      seconds Patient's skin is warm and dry. Respiratory: Airway is patent Respiratory           

      effort is even, unlabored, Respiratory pattern is regular, symmetrical. GI: Abdomen is      

      obese, Bowel sounds present X 4 quads. Abd is soft X 4 quads Abdomen is tender to           

      palpation in right lower quadrant and left lower quadrant Reports diarrhea, since 2         

      weeks. : leg bag noted to right leg. EENT: No signs and/or symptoms were reported         

      regarding the EENT system. Derm: Wound noted right gluteus aj. Derm:                   

      Musculoskeletal: Range of motion: intact in left knee, left ankle, right knee and right     

      ankle Swelling present in right leg and left leg.                                           

14:00 Reassessment: Patient appears in no apparent distress at this time. Patient and/or      em  

      family updated on plan of care and expected duration. Pain level reassessed. Patient is     

      alert, oriented x 3, equal unlabored respirations, skin warm/dry/pink. x-ray at bedside.    

15:00 Reassessment: Patient appears in no apparent distress at this time. Patient and/or      em  

      family updated on plan of care and expected duration. Pain level reassessed. Patient is     

      alert, oriented x 3, equal unlabored respirations, skin warm/dry/pink. rates pain 9/10,     

      request some medication for pain and nausea, provider notified, new medication orders       

      received.                                                                                   

16:06 Reassessment: Patient appears in no apparent distress at this time. Patient and/or      kr2 

      family updated on plan of care and expected duration. Pain level reassessed. Patient is     

      alert, oriented x 3, equal unlabored respirations, skin warm/dry/pink. Patient states       

      symptoms have improved.                                                                     

17:25 Reassessment: Awaiting transportation for patient.                                      kr2 

17:34 Reassessment: Patient appears in no apparent distress at this time. Patient and/or      kr2 

      family updated on plan of care and expected duration. Pain level reassessed. Patient is     

      alert, oriented x 3, equal unlabored respirations, skin warm/dry/pink. [facility]     

      here to transport patient to his home Patient states feeling better.                        

                                                                                                  

Vital Signs:                                                                                      

11:18  / 91; Pulse 93; Resp 18; Temp 98.2; Pulse Ox 100% ; Weight 132.9 kg; Pain 9/10;  sv  

13:00  / 78; Pulse 78; Resp 16; Pulse Ox 99% on R/A;                                    em  

14:11  / 88; Pulse 87; Resp 15; Pulse Ox 100% on R/A; Pain 9/10;                        em  

15:00  / 93; Pulse 96; Resp 16; Pulse Ox 99% on R/A; Pain 9/10;                         em  

16:06  / 92; Pulse 100; Resp 18; Pulse Ox 100% on R/A;                                  kr2 

                                                                                                  

ED Course:                                                                                        

11:11 Patient arrived in ED.                                                                  as  

11:11 Camelia Knox MD is Private Physician.                                                      as  

11:17 Triage completed.                                                                       sv  

11:18 Arm band placed on.                                                                     sv  

12:07 Juan Luis Lerner MD is Attending Physician.                                              kdr 

12:56 Phillip Juárez LVN is Primary Nurse.                                                     em  

13:14 Patient has correct armband on for positive identification.                             em  

13:32 Warm blanket given. Pillow given. Pulse ox on. NIBP on. Assisted to bathroom. Cleaned   jp3 

      of incontinence.                                                                            

13:50 Inserted saline lock: 22 gauge in right antecubital area, using aseptic technique.      em  

      Blood collected.                                                                            

13:50 Initial lab(s) drawn, by me, sent to lab. First set of blood cultures drawn by me.      em  

14:03 EKG done, by EKG tech. reviewed by Juan Luis Lerner MD.                                    sm3 

14:10 X-ray completed. Portable x-ray completed in exam room.                                 mh1 

14:11 Chest Single View XRAY In Process Unspecified.                                          EDMS

15:18 CPK Sent.                                                                               jp3 

15:18 Ckmb Sent.                                                                              jp3 

15:18 CBC with Diff Sent.                                                                     jp3 

15:18 Blood Culture Adult (2) Sent.                                                           jp3 

15:18 Basic Metabolic Panel Sent.                                                             jp3 

16:06 Urine Culture Sent.                                                                     jp3 

16:13 Camelia Knox MD is Referral Physician.                                                     kdr 

17:35 No provider procedures requiring assistance completed. IV discontinued, intact,         kr2 

      bleeding controlled, No redness/swelling at site. Pressure dressing applied.                

                                                                                                  

Administered Medications:                                                                         

15:07 Drug: Norco 10 mg-325 mg 1 tabs Route: PO;                                              em  

16:20 Follow up: Response: No adverse reaction; Pain is decreased                             kr2 

15:08 Drug: Phenergan 25 mg Route: PO;                                                        em  

16:19 Follow up: Response: No adverse reaction; Nausea is decreased                           kr2 

16:19 Not Given (Patient refused, request different medication): Doxycycline 100 mg PO once   kr2 

16:48 Drug: fentaNYL (PF) 50 mcg Route: IVP; Site: right upper arm;                           kr2 

17:34 Follow up: Response: No adverse reaction; Pain is decreased                             kr2 

                                                                                                  

                                                                                                  

Outcome:                                                                                          

16:13 Discharge ordered by MD.                                                                kdr 

17:36 Discharged to home via ambulance.                                                       kr2 

17:36 Condition: good                                                                             

17:36 Discharge instructions given to patient, Instructed on discharge instructions, follow       

      up and referral plans. medication usage, Demonstrated understanding of instructions,        

      follow-up care, medications, Prescriptions given X 2.                                       

17:37 Patient left the ED.                                                                    kr2 

                                                                                                  

Signatures:                                                                                       

Dispatcher [employer]                           Judy Williamson, Juan Luis Graves RN, MD MD kdr Harvey, Martha                               1                                                  

Phillip Juárez, CARON                       LVN  Opal Hernandez Karey, RN                       RN   kr2                                                  

Vanessa Rucker                              3                                                  

Javier Chatterjee                              jp3                                                  

                                                                                                  

**************************************************************************************************

## 2018-11-07 ENCOUNTER — HOSPITAL ENCOUNTER (EMERGENCY)
Dept: HOSPITAL 97 - ER | Age: 33
LOS: 1 days | Discharge: ANOTHER HOSPITAL | End: 2018-11-08
Payer: COMMERCIAL

## 2018-11-07 DIAGNOSIS — R51: Primary | ICD-10-CM

## 2018-11-07 DIAGNOSIS — Z88.6: ICD-10-CM

## 2018-11-07 DIAGNOSIS — Z98.2: ICD-10-CM

## 2018-11-07 DIAGNOSIS — Z88.1: ICD-10-CM

## 2018-11-07 DIAGNOSIS — I10: ICD-10-CM

## 2018-11-07 DIAGNOSIS — Z88.8: ICD-10-CM

## 2018-11-07 DIAGNOSIS — Q05.9: ICD-10-CM

## 2018-11-07 DIAGNOSIS — F17.210: ICD-10-CM

## 2018-11-07 DIAGNOSIS — Z88.5: ICD-10-CM

## 2018-11-07 DIAGNOSIS — Z88.3: ICD-10-CM

## 2018-11-07 PROCEDURE — 80053 COMPREHEN METABOLIC PANEL: CPT

## 2018-11-07 PROCEDURE — 96375 TX/PRO/DX INJ NEW DRUG ADDON: CPT

## 2018-11-07 PROCEDURE — 85025 COMPLETE CBC W/AUTO DIFF WBC: CPT

## 2018-11-07 PROCEDURE — 75809 NONVASCULAR SHUNT X-RAY: CPT

## 2018-11-07 PROCEDURE — 36415 COLL VENOUS BLD VENIPUNCTURE: CPT

## 2018-11-07 PROCEDURE — 49427 INJECTION ABDOMINAL SHUNT: CPT

## 2018-11-07 PROCEDURE — 99285 EMERGENCY DEPT VISIT HI MDM: CPT

## 2018-11-07 PROCEDURE — 96374 THER/PROPH/DIAG INJ IV PUSH: CPT

## 2018-11-07 PROCEDURE — 85610 PROTHROMBIN TIME: CPT

## 2018-11-07 PROCEDURE — 70450 CT HEAD/BRAIN W/O DYE: CPT

## 2018-11-07 NOTE — XMS REPORT
[facility]

 Created on:2018



Patient:Redd Dale

Sex:Male

:1985

External Reference #:[id number]





Demographics







 Address  [address] [place] [address]

 

 Phone  [phone]

 

 Preferred Language  en

 

 Marital Status  Unknown

 

 [employer] Affiliation  Unknown

 

 Race  Black or 

 

 Ethnic Group  Not  or 









Author







 Organization  [employer]









Care Team Providers







 Name  Role  Phone

 

 Knox, Na  Provider Role  Unavailable









Allergies

No Known Allergies



Problems







 Problem Type  Condition  Code  Onset Dates  Condition Status

 

 Problem  Nicotine dependence  F17.200    Active

 

 Problem  Lumbar spina bifida with  Q05.2    Active



   hydrocephalus      

 

 Problem  Dependence on wheelchair  Z99.3    Active

 

 Problem  Fecal incontinence  R15.9    Active

 

 Problem  Foot ulcer  L97.509    Active

 

 Problem  Depression with anxiety  F41.8    Active

 

 Problem  Paraplegia  G82.20    Active

 

 Problem  Constipation  K59.00    Active

 

 Problem  Incontinence of urine  R32    Active

 

 Problem  Nausea alone  R11.0    Active

 

 Problem  Episodic tension-type headache, not  G44.219    Active



   intractable      

 

 Problem  Nonintractable episodic headache,  R51    Active



   unspecified headache type      

 

 Problem   (ventriculoperitoneal) shunt  Z98.2    Active



   status      







Medications

No Known Medications



Results

No Known Results



Summary Purpose

[employer] Submission

## 2018-11-07 NOTE — XMS REPORT
Clinical Summary

 Created on:2018



Patient:Redd Dale

Sex:Male

:1985

External Reference #:[id number]





Demographics







 Address  [address]



   [place] [address]

 

 Home Phone  [phone]

 

 Preferred Language  English

 

 Marital Status  Unknown

 

 [employer] Affiliation  Unknown

 

 Race  Black or 

 

 Ethnic Group  Not  or 









Author







 Organization  [employer]

 

 Address  [address] [place] [address]









Support







 Name  Relationship  Address  Phone

 

 Sabrina Dale  Unavailable  [address]  [phone]



     [place] [address]  









Care Team Providers







 Name  Role  Phone

 

 Ta  Primary Care Provider  [phone]









Allergies







 Active Allergy  Reactions  Severity  Noted Date  Comments

 

 Sulfamethoxazole-Trimethoprim  Hives  High  2017  

 

 Levofloxacin  Hives  High  2017  

 

 Morphine  Hives  High  2017  

 

 Sesame Seed  Hives    2017  

 

 Ketorolac  Rash  High  2017  

 

 Vancomycin Analogues  Rash  High  2017  

 

 Ondansetron Hcl (Pf)  Nausea And Vomiting    2017  







Medications







 Medication  Sig  Dispensed  Refills  Start Date  End Date  Status

 

 oxyCODONE-acetaminophe  Take 1 tablet by    0      Active



 n (PERCOCET)  mg  mouth every 4          



 per tablet  (four) hours as          



   needed for Pain.          







Active Problems







 Problem  Noted Date

 

 Spina bifida  2017

 

 Pyelonephritis  2017







Social History







 Tobacco Use  Types  Packs/Day  Years Used  Date

 

 Current Every Day Smoker  Cigarettes  0.5    









 Tobacco Cessation: Ready to Quit: No









 Sex Assigned at Birth  Date Recorded

 

 Not on file  









 Job Start Date  Occupation  Industry

 

 Not on file  Not on file  Not on file









 Travel History  Travel Start  Travel End









 No recent travel history available.







Last Filed Vital Signs

Not on file



Plan of Treatment

Not on file



Results

Not on fileafter 2017



Insurance







 Payer  Benefit Plan / Group  Subscriber ID  Type  Phone  Address

 

 MEDICAID - MEDICAID  [employer]  [id number]  Medicaid    



 [facility]      Non-Contracted    









 Guarantor Name  Account Type  Relation to  Date of  Phone  Billing Address



     Patient  Birth    

 

 Redd Dale  Personal/Family  Self  1985  [phone]  [address]



         (Home)  [address]







           [place]



           [address]







Advance Directives

For more information, please contact:[facility], [place] [phone]





 Code Status  Date Activated  Date Inactivated  Comments

 

 Full Code  2017  1:36 AM  2017  8:43 PM  









 This code status was determined by:  Patient

## 2018-11-07 NOTE — XMS REPORT
Patient Summary Document

 Created on:2018



Patient:JORDAN VERDIN

Sex:Male

:1985

External Reference #:[id number]





Demographics







 Address  [address]



   [place] [address]

 

 Home Phone  [phone]

 

 Work Phone  [phone]

 

 Email Address  [phone]

 

 Preferred Language  Unknown

 

 Marital Status  Unknown

 

 Ep[employer] Affiliation  Unknown

 

 Race  Unknown

 

 Additional Race(s)  Unavailable

 

 Ethnic Group  Unknown









Author







 Organization  [facility]

 

 Address  [address] [place] [address] [address]



   [place] [address]

 

 Phone  [phone]









Care Team Providers







 Name  Role  Phone

 

 RAMU TORRES  Unavailable  Unavailable









Problems

This patient has no known problems.



Allergies, Adverse Reactions, Alerts

This patient has no known allergies or adverse reactions.



Medications

This patient has no known medications.



Results







 Test Description  Test Time  Test Comments  Text Results  Atomic Results  
Result Comments









 BLOOD CULTURE  2017 16:22:00    









   Test Item  Value  Reference Range  Comments









 CULTURE (BEAKER) (test code=1095)  No growth in 5 days    



BLOOD [id number] 16:22:00





 Test Item  Value  Reference Range  Comments

 

 CULTURE (BEAKER) (test code=1095)  No growth in 5 days    



(MANUAL DIFFERENTIAL)2017 22:29:00





 Test Item  Value  Reference Range  Comments

 

 TOTAL COUNTED (BEAKER) (test code=1351)      

 

 WBC MORPHOLOGY (BEAKER) (test code=487)  Normal    

 

 PLT MORPHOLOGY (BEAKER) (test code=486)  Normal    

 

 RBC MORPHOLOGY (BEAKER) (test code=762)  Normal    



CBC W/PLT COUNT &amp; AUTO [id number] 22:28:00





 Test Item  Value  Reference Range  Comments

 

 WHITE BLOOD CELL COUNT (BEAKER) (test code=775)  7.3 K/ L  4.0-10.0  

 

 RED BLOOD CELL COUNT (BEAKER) (test code=761)  5.09 M/ L  4.20-5.80  

 

 HEMOGLOBIN (BEAKER) (test code=410)  13.0 GM/DL  13.0-16.8  

 

 HEMATOCRIT (BEAKER) (test code=411)  42.3 %  40.0-50.0  

 

 MEAN CORPUSCULAR VOLUME (BEAKER) (test code=753)  83.0 fL  82.0-98.0  

 

 MEAN CORPUSCULAR HEMOGLOBIN (BEAKER) (test  25.6 pg  27.0-33.0  



 code=751)      

 

 MEAN CORPUSCULAR HEMOGLOBIN CONC (BEAKER) (test  30.8 GM/DL  32.0-36.0  



 code=752)      

 

 RED CELL DISTRIBUTION WIDTH (BEAKER) (test  18.0 %  10.3-14.2  



 code=412)      

 

 PLATELET COUNT (BEAKER) (test code=756)  331 K/CU MM  150-430  

 

 MEAN PLATELET VOLUME (BEAKER) (test code=754)  8.5 fL  6.5-10.5  

 

 NUCLEATED RED BLOOD CELLS (BEAKER) (test  0 /100 WBC  0-0  



 code=413)      

 

 NEUTROPHILS RELATIVE PERCENT (BEAKER) (test  65 %    



 code=429)      

 

 LYMPHOCYTES RELATIVE PERCENT (BEAKER) (test  23 %    



 code=430)      

 

 MONOCYTES RELATIVE PERCENT (BEAKER) (test  8 %    



 code=431)      

 

 EOSINOPHILS RELATIVE PERCENT (BEAKER) (test  3 %    



 code=432)      

 

 BASOPHILS RELATIVE PERCENT (BEAKER) (test  1 %    



 code=437)      

 

 NEUTROPHILS ABSOLUTE COUNT (BEAKER) (test  4.75 K/ L  1.80-8.00  



 code=670)      

 

 LYMPHOCYTES ABSOLUTE COUNT (BEAKER) (test  1.68 K/ L  1.48-4.50  



 code=414)      

 

 MONOCYTES ABSOLUTE COUNT (BEAKER) (test  0.55 K/ L  0.00-1.30  



 code=415)      

 

 EOSINOPHILS ABSOLUTE COUNT (BEAKER) (test  0.24 K/ L  0.00-0.50  



 code=416)      

 

 BASOPHILS ABSOLUTE COUNT (BEAKER) (test  0.05 K/ L  0.00-0.20  



 code=417)      



[id number] METABOLIC [id number] 11:11:00





 Test Item  Value  Reference Range  Comments

 

 SODIUM (BEAKER) (test  138 meq/L  136-145  



 taef=000)      

 

 POTASSIUM (BEAKER) (test  4.0 meq/L  3.5-5.1  



 code=379)      

 

 CHLORIDE (BEAKER) (test  108 meq/L    



 code=382)      

 

 CO2 (BEAKER) (test  23 meq/L  22-29  



 code=355)      

 

 BLOOD UREA NITROGEN  11 mg/dL  7-21  



 (BEAKER) (test code=354)      

 

 CREATININE (BEAKER) (test  0.74 mg/dL  0.57-1.25  



 code=358)      

 

 GLUCOSE RANDOM (BEAKER)  124 mg/dL    



 (test code=652)      

 

 CALCIUM (BEAKER) (test  8.9 mg/dL  8.4-10.2  



 code=697)      

 

 EGFR (BEAKER) (test  150 mL/min/1.73 sq m    ESTIMATED GFR IS NOT AS



 code=1092)      ACCURATE AS CREATININE



       CLEARANCE IN PREDICTING



       GLOMERULAR FILTRATION



       RATE. ESTIMATED GFR IS



       NOT APPLICABLE FOR



       DIALYSIS PATIENTS.



URINE [id number] 09:56:00





 Test Item  Value  Reference Range  Comments

 

 CULTURE (BEAKER) (test code=1095)  <10,000 col/mL skin jaime    



COMPREHENSIVE METABOLIC [id number] 08:49:00





 Test Item  Value  Reference Range  Comments

 

 TOTAL PROTEIN (BEAKER)  7.3 gm/dL  6.0-8.3  



 (test code=770)      

 

 ALBUMIN (BEAKER) (test  3.3 g/dL  3.5-5.0  



 code=1145)      

 

 ALKALINE PHOSPHATASE  89 U/L    



 (BEAKER) (test code=346)      

 

 BILIRUBIN TOTAL (BEAKER)  1.4 mg/dL  0.2-1.2  



 (test code=377)      

 

 SODIUM (BEAKER) (test  137 meq/L  136-145  



 cwdi=935)      

 

 POTASSIUM (BEAKER) (test  3.7 meq/L  3.5-5.1  



 code=379)      

 

 CHLORIDE (BEAKER) (test  108 meq/L    



 code=382)      

 

 CO2 (BEAKER) (test  17 meq/L  22-29  



 code=355)      

 

 BLOOD UREA NITROGEN  12 mg/dL  7-21  



 (BEAKER) (test code=354)      

 

 CREATININE (BEAKER) (test  0.78 mg/dL  0.57-1.25  



 code=358)      

 

 GLUCOSE RANDOM (BEAKER)  119 mg/dL    



 (test code=652)      

 

 CALCIUM (BEAKER) (test  8.6 mg/dL  8.4-10.2  



 code=697)      

 

 AST (SGOT) (BEAKER) (test  13 U/L  5-34  



 code=353)      

 

 ALT (SGPT) (BEAKER) (test  28 U/L  6-55  



 code=347)      

 

 EGFR (BEAKER) (test  141 mL/min/1.73 sq    ESTIMATED GFR IS NOT AS



 code=1092)  m    ACCURATE AS CREATININE



       CLEARANCE IN PREDICTING



       GLOMERULAR FILTRATION



       RATE. ESTIMATED GFR IS



       NOT APPLICABLE FOR



       DIALYSIS PATIENTS.



CBC W/PLT COUNT &amp; AUTO [id number] 08:46:00





 Test Item  Value  Reference Range  Comments

 

 WHITE BLOOD CELL COUNT (BEAKER) (test code=775)  9.4 K/ L  4.0-10.0  

 

 RED BLOOD CELL COUNT (BEAKER) (test code=761)  4.79 M/ L  4.20-5.80  

 

 HEMOGLOBIN (BEAKER) (test code=410)  12.8 GM/DL  13.0-16.8  

 

 HEMATOCRIT (BEAKER) (test code=411)  40.0 %  40.0-50.0  

 

 MEAN CORPUSCULAR VOLUME (BEAKER) (test code=753)  83.4 fL  82.0-98.0  

 

 MEAN CORPUSCULAR HEMOGLOBIN (BEAKER) (test  26.7 pg  27.0-33.0  



 code=751)      

 

 MEAN CORPUSCULAR HEMOGLOBIN CONC (BEAKER) (test  32.0 GM/DL  32.0-36.0  



 code=752)      

 

 RED CELL DISTRIBUTION WIDTH (BEAKER) (test  18.2 %  10.3-14.2  



 code=412)      

 

 PLATELET COUNT (BEAKER) (test code=756)  313 K/CU MM  150-430  

 

 MEAN PLATELET VOLUME (BEAKER) (test code=754)  8.6 fL  6.5-10.5  

 

 NUCLEATED RED BLOOD CELLS (BEAKER) (test  0 /100 WBC  0-0  



 code=413)      

 

 NEUTROPHILS RELATIVE PERCENT (BEAKER) (test  70 %    



 code=429)      

 

 LYMPHOCYTES RELATIVE PERCENT (BEAKER) (test  16 %    



 code=430)      

 

 MONOCYTES RELATIVE PERCENT (BEAKER) (test  12 %    



 code=431)      

 

 EOSINOPHILS RELATIVE PERCENT (BEAKER) (test  1 %    



 code=432)      

 

 BASOPHILS RELATIVE PERCENT (BEAKER) (test  1 %    



 code=437)      

 

 NEUTROPHILS ABSOLUTE COUNT (BEAKER) (test  6.54 K/ L  1.80-8.00  



 code=670)      

 

 LYMPHOCYTES ABSOLUTE COUNT (BEAKER) (test  1.50 K/ L  1.48-4.50  



 code=414)      

 

 MONOCYTES ABSOLUTE COUNT (BEAKER) (test  1.13 K/ L  0.00-1.30  



 code=415)      

 

 EOSINOPHILS ABSOLUTE COUNT (BEAKER) (test  0.13 K/ L  0.00-0.50  



 code=416)      

 

 BASOPHILS ABSOLUTE COUNT (BEAKER) (test  0.06 K/ L  0.00-0.20  



 code=417)      



0.00URINALYSIS W/ [id number] 20:36:00





 Test Item  Value  Reference Range  Comments

 

 COLOR (BEAKER) (test code=470)  Yellow    

 

 CLARITY (BEAKER) (test code=469)  Hazy    

 

 SPECIFIC GRAVITY UA (BEAKER) (test code=468)  1.012  1.001-1.035  

 

 PH UA (BEAKER) (test code=467)  5.5  5.0-8.0  

 

 PROTEIN UA (BEAKER) (test code=464)  100 mg/dL  Negative  

 

 GLUCOSE UA (BEAKER) (test code=365)  Negative  Negative  

 

 KETONES UA (BEAKER) (test code=371)  Negative  Negative  

 

 BILIRUBIN UA (BEAKER) (test code=462)  Negative  Negative  

 

 BLOOD UA (BEAKER) (test code=461)  Moderate  Negative  

 

 NITRITE UA (BEAKER) (test code=465)  Negative  Negative  

 

 LEUKOCYTE ESTERASE UA (BEAKER) (test code=466)  Large  Negative  

 

 UROBILINOGEN UA (BEAKER) (test code=463)  3.0 mg/dL  0.2-1.0  

 

 RBC UA (BEAKER) (test code=519)  19 /HPF    

 

 WBC UA (BEAKER) (test code=520)  182 /HPF    

 

 SOURCE(BEAKER) (test code=1009)      



BASIC METABOLIC [id number] 17:00:00





 Test Item  Value  Reference Range  Comments

 

 SODIUM (BEAKER) (test  140 meq/L  136-145  



 akav=549)      

 

 POTASSIUM (BEAKER) (test  3.7 meq/L  3.5-5.1  



 code=379)      

 

 CHLORIDE (BEAKER) (test  112 meq/L    



 code=382)      

 

 CO2 (BEAKER) (test  17 meq/L  22-29  



 code=355)      

 

 BLOOD UREA NITROGEN  23 mg/dL  7-21  



 (BEAKER) (test code=354)      

 

 CREATININE (BEAKER) (test  1.00 mg/dL  0.57-1.25  



 code=358)      

 

 GLUCOSE RANDOM (BEAKER)  102 mg/dL    



 (test code=652)      

 

 CALCIUM (BEAKER) (test  8.7 mg/dL  8.4-10.2  



 code=697)      

 

 EGFR (BEAKER) (test  106 mL/min/1.73 sq m    ESTIMATED GFR IS NOT AS



 code=1092)      ACCURATE AS CREATININE



       CLEARANCE IN PREDICTING



       GLOMERULAR FILTRATION



       RATE. ESTIMATED GFR IS



       NOT APPLICABLE FOR



       DIALYSIS PATIENTS.



Specimen slightly ictericCBC W/PLT COUNT &amp; AUTO [id number] 12:18
:00





 Test Item  Value  Reference Range  Comments

 

 WHITE BLOOD CELL COUNT (BEAKER) (test code=775)  16.4 K/ L  4.0-10.0  

 

 RED BLOOD CELL COUNT (BEAKER) (test code=761)  5.22 M/ L  4.20-5.80  

 

 HEMOGLOBIN (BEAKER) (test code=410)  14.4 GM/DL  13.0-16.8  

 

 HEMATOCRIT (BEAKER) (test code=411)  42.9 %  40.0-50.0  

 

 MEAN CORPUSCULAR VOLUME (BEAKER) (test code=753)  82.2 fL  82.0-98.0  

 

 MEAN CORPUSCULAR HEMOGLOBIN (BEAKER) (test  27.5 pg  27.0-33.0  



 code=751)      

 

 MEAN CORPUSCULAR HEMOGLOBIN CONC (BEAKER) (test  33.5 GM/DL  32.0-36.0  



 code=752)      

 

 RED CELL DISTRIBUTION WIDTH (BEAKER) (test  16.2 %  10.3-14.2  



 code=412)      

 

 PLATELET COUNT (BEAKER) (test code=756)  329 K/CU MM  150-430  

 

 MEAN PLATELET VOLUME (BEAKER) (test code=754)  8.2 fL  6.5-10.5  

 

 NUCLEATED RED BLOOD CELLS (BEAKER) (test  0 /100 WBC  0-0  



 code=413)      

 

 NEUTROPHILS RELATIVE PERCENT (BEAKER) (test  83 %    



 code=429)      

 

 LYMPHOCYTES RELATIVE PERCENT (BEAKER) (test  7 %    



 code=430)      

 

 MONOCYTES RELATIVE PERCENT (BEAKER) (test  9 %    



 code=431)      

 

 EOSINOPHILS RELATIVE PERCENT (BEAKER) (test  0 %    



 code=432)      

 

 BASOPHILS RELATIVE PERCENT (BEAKER) (test  0 %    



 code=437)      

 

 NEUTROPHILS ABSOLUTE COUNT (BEAKER) (test  1.62 K/ L  1.80-8.00  



 code=670)      

 

 LYMPHOCYTES ABSOLUTE COUNT (BEAKER) (test  1.15 K/ L  1.48-4.50  



 code=414)      

 

 MONOCYTES ABSOLUTE COUNT (BEAKER) (test  1.56 K/ L  0.00-1.30  



 code=415)      

 

 EOSINOPHILS ABSOLUTE COUNT (BEAKER) (test  0.01 K/ L  0.00-0.50  



 code=416)      

 

 BASOPHILS ABSOLUTE COUNT (BEAKER) (test  0.02 K/ L  0.00-0.20  



 code=417)      



(MANUAL DIFFERENTIAL)2017 12:18:00





 Test Item  Value  Reference Range  Comments

 

 TOTAL COUNTED (BEAKER) (test code=1351)      

 

 WBC MORPHOLOGY (BEAKER) (test code=487)  Normal    

 

 PLT MORPHOLOGY (BEAKER) (test code=486)  Normal    

 

 RBC MORPHOLOGY (BEAKER) (test code=762)  Normal

## 2018-11-07 NOTE — XMS REPORT
[facility]

 Created on:2018



Patient:Redd Dale

Sex:Male

:1985

External Reference #:[id number]





Demographics







 Address  [address]  [place] [address]

 

 Phone  [phone]

 

 Preferred Language  en

 

 Marital Status  Unknown

 

 Mosque Affiliation  Unknown

 

 Race  Black or 

 

 Ethnic Group  Not  or 









Author







 Organization  [employer]









Care Team Providers







 Name  Role  Phone

 

 Knox, Na  Provider Role  Unavailable









Allergies, Adverse Reactions, Alerts







 Substance  Reaction  Event Type

 

 Toradol  Info Not Available  Drug Allergy

 

 Sulfa  Info Not Available  Drug Allergy

 

 Zofran  Info Not Available  Drug Allergy

 

 Morphine Sulfate ER  Info Not Available  Drug Allergy

 

 Levaquin  Info Not Available  Drug Allergy







Problems







 Problem Type  Condition  Code  Onset Dates  Condition Status

 

 Problem  Nicotine dependence  F17.200    Active

 

 Problem  Lumbar spina bifida with  Q05.2    Active



   hydrocephalus      

 

 Problem  Dependence on wheelchair  Z99.3    Active

 

 Problem  Fecal incontinence  R15.9    Active

 

 Problem  Foot ulcer  L97.509    Active

 

 Problem  Depression with anxiety  F41.8    Active

 

 Problem  Paraplegia  G82.20    Active

 

 Problem  Constipation  K59.00    Active

 

 Problem  Incontinence of urine  R32    Active

 

 Problem  Nausea alone  R11.0    Active

 

 Assessment  Episodic tension-type headache, not  G44.219    Active



   intractable      

 

 Assessment   (ventriculoperitoneal) shunt  Z98.2    Active



   status      

 

 Assessment  Ulcer of left foot, unspecified  L97.529    Active



   ulcer stage      

 

 Assessment  Nonintractable episodic headache,  R51    Active



   unspecified headache type      

 

 Assessment  Depression with anxiety  F41.8    Active

 

 Problem  Episodic tension-type headache, not  G44.219    Active



   intractable      

 

 Assessment  Lumbar spina bifida with  Q05.2    Active



   hydrocephalus      

 

 Problem  Nonintractable episodic headache,  R51    Active



   unspecified headache type      

 

 Assessment  Nausea and vomiting, intractability  R11.2    Active



   of vomiting not specified,      



   unspecified vomiting type      

 

 Problem   (ventriculoperitoneal) shunt  Z98.2    Active



   status      







Medications







 Medication  Code  Code  Instructions  Start  End  Status  Dosage



   System      Date  Date    

 

 Tylenol with  NDC  90558537525  300-60 MG      Active  1 tablet as



 Codeine #4      Orally every 6        needed



       hrs        

 

 Macrobid  NDC  [id number]  100 MG Orally      Active  1 capsule



       every 12 hrs        with food

 

 Pantoprazole  NDC  [id number]  40 MG Orally      Active  1 tablet



 Sodium      Once a day        

 

 Collagenase  NDC  [id number]  250 UNIT/GM      Active  1 application



       Externally        to affected



       Once a day        area







Results

No Known Results



Summary Purpose

[employer] Submission

## 2018-11-07 NOTE — XMS REPORT
Continuity of Care Document

 Created on:May 8, 2018



Patient:JORDAN VERDIN JR

Sex:Male

:1985

External Reference #:[id number]





Demographics







 Address  [address]  [place] [address]

 

 Phone  [phone]

 

 Phone  [phone]

 

 Preferred Language  Unknown

 

 Marital Status  Unknown

 

 Mormonism Affiliation  Unknown

 

 Race  Unknown

 

 Ethnic Group  Unknown









Author







 Organization  [facility]









Problems







 Problem  Status  Onset  Classification  Date  Comments  Source



     Date    Reported    



             



             



             

 

 SHUNT MALFUNCTION  Active  20        [address]



             



             

 

 ACUTE HEADACHE  Active  20        [address]



             



             

 

 Acute pain  Active    Problem  2018    [facility]



             



             

 

 Asthma  Resolved    Problem  2018    [facility]



             



             

 

 Bronchitis  Resolved    Problem  2018    [facility]



             



             

 

 Cerebral palsy  Resolved    Problem  2018    [facility]



             



             

 

 Headache  Active    Problem  2018    [facility]



             



             

 

 Hydrocephalus  Resolved    Problem  2018    [facility]



             



             

 

 Osteomyelitis  Resolved    Problem  2018    [facility]



             



             

 

 HEADACHE  Active          [facility]



             



             







Medications







 Medication  Details  Route  Status  Patient  Ordering  Order  Source



         Instructions  Provider  Date  



               



               



               

 

 Docusate Sodium  100 mg=1 cap,    Active      [facility]



 100 MG Oral  PO, BID, 0          2018  Medical



 Capsule  Refill(s)            [facility]



               



               

 

 Zosyn  0 Refill(s)    Active        [facility] [place]



             2018  Medical



               [facility]



               



               

 

 celecoxib 200  200 mg=1 cap,    Active      [facility]



 mg oral capsule  PO, BID, 0          2018  Medical



   Refill(s)            [facility]



               



               

 

 ascorbic acid  500 mg=1 tab,    Active      [facility] [place]



   PO, BID, 0          2018  Medical



   Refill(s)            Center



               



               

 

 acetaminophen  1,000 mg=2 tab,    Active      [facility]



 500 mg oral  PO, Q6Hnow, 0          2018  Medical



 tablet  Refill(s)            [facility]



               



               

 

 Oxycodone  5 mg=1 tab, PO,    Active      [facility] [place]



 Hydrochloride 5  Q4H, PRN Pain          2018  Medical



 MG Oral Tablet  Score 4-6, 0            Center



   Refill(s)            



               



               

 

 zinc sulfate  220 mg=1 cap,    Active      [facility] [place]



 220 mg oral  PO, Daily, 0          2018  Medical



 capsule  Refill(s)            [facility]



               



               

 

 multivitamin  1 tab, PO,    Active      [facility] [place]



   Daily, 0          2018  Medical



   Refill(s)            Center



               



               

 

 methocarbamol  1,000 mg=2 tab,    Active      [facility]



 500 mg oral  PO, Q8H, 0          2018  Medical



 tablet  Refill(s)            Center



               



               

 

 LORazepam 0.5  0.5 mg=1 tab,    Active         [place]



 mg oral tablet  PO, Q8H, PRN          2018  Medical



   Anxiety, 0            Center



   Refill(s)            



               



               

 

 Lidocaine  3 patch, TOP,    Active        [facility] [place]



 Hydrochloride  Daily, Remove          2018  Medical



 0.05 MG/MG  after 12 hours,            Center



 Transdermal  0 Refill(s)            



 Patch              



 [Lidoderm]              



               



               

 

 Robaxin  1,000 mg, 2    No Longer        [facility]



   tab, Route: PO,    Active      2018  Medical



   Drug form: TAB,            Center



   Q8H, Dosing            



   Weight 127.027,            



   kg, Start date:            



   18            



   16:00:00 CDT,            



   Duration: 30            



   day, Stop date:            



   18            



   8:00:00            



   CDTNotes: (Same            



   as:Robaxin)            



               



               

 

 Oxycodone  10 mg, 2 tab,    No Longer        [facility] [place]



 Hydrochloride 5  Route: PO, Drug    Active      2018  Medical



 MG Oral Tablet  form: TAB,            Center



   Daily, Dosing            



   Weight 127.027,            



   kg, PRN            



   Procedure,            



   Start date:            



   18            



   13:06:00 CDT,            



   Duration: 30            



   day, Stop date:            



   18            



   13:05:00            



   CDTNotes: (Same            



   as: Roxicodone)            



               



               

 

 Ativan  0.5 mg, 1 tab,    No Longer        [facility]



   Route: PO, Drug    Active        Medical



   form: TAB, Q8H,            Center



   Dosing Weight            



   127.027, kg,            



   PRN Anxiety,            



   Start date:            



   18            



   10:18:00 CDT,            



   Duration: 7            



   day, Stop date:            



   18            



   10:17:00            



   CDTNotes: (Same            



   as: Ativan)            



               



               

 

 Trazodone  50 mg, 1 tab,    No Longer        [facility] [place]



 Hydrochloride  Route: PO, Drug    Active      2018  Medical



 50 MG Oral  form: TAB,            Center



 Tablet  Bedtime, Dosing            



   Weight 127.027,            



   kg, Start date:            



   18            



   21:00:00 CDT,            



   Duration: 30            



   day, Stop date:            



   18            



   21:00:00            



   CDTNotes: (Same            



   As: Desyrel)            



               



               

 

 remove patch  3 patch, Route:    No Longer        [facility] [place]



   TOP, Bedtime,    Active        Medical



   Drug form:            Center



   ERFILM, Start            



   date: 18            



   21:00:00 CDT,            



   Duration: 30            



   day, Stop date:            



   18            



   21:00:00            



   CDTNotes:            



   Remove patch 12            



   hours after            



   application            



   each day.            



               



               

 

 Oxycodone  10 mg, 2 tab,    Inactive        [facility] [place]



 Hydrochloride 5  Route: PO, Drug          2018  Medical



 MG Oral Tablet  form: TAB,            Center



   ONCE, Dosing            



   Weight 127.027,            



   kg, Start date:            



   18            



   17:01:00 CDT,            



   Stop date:            



   18            



   17:01:00            



   CDTNotes: (Same            



   as: Roxicodone)            



               



               

 

 Celebrex  200 mg, 1 cap,    No Longer         [place]



   Route: PO, Drug    Active      2018  Medical



   form: CAP, BID,            [facility]



   Dosing Weight            



   127.027, kg,            



   Start date:            



   18            



   17:00:00 CDT,            



   Duration: 30            



   day, Stop date:            



   18            



   9:00:00            



   CDTNotes:            



   NSAID. Please            



   check            



   indication. Not            



   for seizure.            



   (Same As:            



   CeleBREX)            



               



               

 

 Vancomycin  1,500 mg, 250    No Longer         [place]



   mL, Route:    Active      2018  Medical



   IVPB, Drug            Center



   form: INJ,            



   [id number], Dosing            



   Weight 127.27,            



   kg, Start date:            



   18            



   16:00:00 CDT,            



   Stop date:            



   05/10/18            



   8:00:00 CDT,            



   ABX Indication:            



   Skin/Soft            



   Tissue            



   InfectionNotes:            



   TIME CRITICAL            



   MEDICATION Same            



   as: Vancocin-NS            



   (premixed)            



   Infusion rate            



   2001 mg: infuse            



   over 2.5 hours            



               



               

 

 Lidocaine  3 patch, Route:    No Longer         [place]



 Hydrochloride  TOP, Daily,    Active      2018  Medical



 0.05 MG/MG  Drug form:            [facility]



 Transdermal  FILM, Start            



 Patch  date: 18            



 [Lidoderm]  9:00:00 CDT,            



   Duration: 7            



   day, Stop date:            



   18            



   9:00:00 CDT,            



   Remove after 12            



   hoursNotes:            



   Apply only once            



   for up to 12            



   hours in a            



   24-hour period            



   (12 hours on            



   and 12 hours            



   off). (Same as:            



   Lidoderm)            



   "Remove old            



   patch before            



   application of            



   new patch"            



               



               

 

 Phenergan  12.5 mg, 0.5    Inactive         [place]



   mL, Route:          2018  Medical



   IVPB, Drug            Center



   form: INJ,            



   ONCE, Dosing            



   Weight 127.027,            



   kg, Priority:            



   NOW, Start            



   date: 18            



   17:40:00 CDT,            



   Stop date:            



   18            



   17:40:00            



   CDTNotes: Do            



   not give IV            



   push.  (Same            



   as: Phenergan)            



               



               

 

 Dilaudid  0.5 mg, 0.25    Inactive       [place]



   mL, Route: IVP,            Medical



   Drug form: INJ,            Center



   ONCE, Dosing            



   Weight 127.027,            



   kg, Priority:            



   NOW, Start            



   date: 18            



   17:40:00 CDT,            



   Stop date:            



   18            



   17:40:00            



   CDTNotes: Same            



   as Dilaudid            



               



               

 

 Tramadol  100 mg, 2 tab,    No Longer        [facility]



   Route: PO, Drug    Active        Medical



   form: TAB,            Center



   Q6Hnow, Dosing            



   Weight 127.027,            



   kg, Start date:            



   18            



   17:00:00 CDT,            



   Duration: 30            



   day, Stop date:            



   18            



   11:00:00            



   CDTNotes: Not            



   to exceed            



   400mg/day.            



   (Same As:            



   Ultram)            



               

 

 gabapentin  600 mg, 2 cap,    No Longer        [facility]



   Route: PO, Drug    Active        Medical



   form: CAP,            Center



   Q8Hnow, Dosing            



   Weight 127.027,            



   kg, Start date:            



   18            



   17:00:00 CDT,            



   Stop date:            



   18            



   9:00:00            



   CDTNotes: (Same            



   as: Neurontin)            



               



               

 

 Acetaminophen  1,000 mg, 2    No Longer        [facility]



   tab, Route: PO,    Active        Medical



   Drug form: TAB,            Center



   Q6Hnow, Dosing            



   Weight 127.027,            



   kg, Start date:            



   18            



   17:00:00 CDT,            



   Duration: 30            



   day, Stop date:            



   18            



   11:00:00            



   CDTNotes: Max            



   acetaminophen            



   4000 mg/day (4            



   gm/day).  (Same            



   as: Tylenol            



   Extra Strength)            



               



               

 

 Robaxin  500 mg, 1 tab,    No Longer        [facility]



   Route: PO, Drug    Active        Medical



   form: TAB, TID,            Center



   Dosing Weight            



   127.027, kg,            



   Start date:            



   18            



   17:00:00 CDT,            



   Duration: 30            



   day, Stop date:            



   18            



   13:00:00            



   CDTNotes: (Same            



   as:Robaxin)            



               



               

 

 Oxycodone  5 mg, 1 tab,    No Longer        [facility] [place]



 Hydrochloride 5  Route: PO, Drug    Active      2018  Medical



 MG Oral Tablet  form: TAB, Q4H,            Center



   Dosing Weight            



   127.027, kg,            



   PRN Pain Score            



   4-6, Start            



   date: 18            



   16:33:00 CDT,            



   Duration: 30            



   day, Stop date:            



   18            



   16:32:00            



   CDTNotes: (Same            



   as: Roxicodone)            



               



               

 

 Beneprotein 7  2 pkt, Route:    No Longer         [place]



 gm pkt  PO, Drug Form:    Active      2018  Medical



   PWDR, Dosing            Center



   Weight 127.027,            



   kg, BID-Before            



   Meals, Start            



   date: 18            



   16:30:00 CDT,            



   Duration: 30            



   day, Stop date:            



   18            



   7:30:00            



   CDTNotes: (Same            



   as:            



   Beneprotein)            



               



               

 

 Acetaminophen  1 tab, PO, TID,    No Longer         [place]



 325 MG /  0 Refill(s)    Active      2018  Medical



 Oxycodone              Center



 Hydrochloride              



 10 MG Oral              



 Tablet              



 [Percocet              



 10/325]              



               

 

 Dilaudid  0.5 mg, 0.25    Inactive         [place]



   mL, Route: IVP,            Medical



   Drug form: INJ,            Center



   ONCE, Start            



   date: 18            



   11:01:00 CDT,            



   Stop date:            



   18            



   11:01:00 CDT            



               



               

 

 Phenergan  25 mg, 1 tab,    Inactive        [facility]



   Route: PO, Drug          2018  Medical



   form: TAB, Q6H,            Center



   Dosing Weight            



   127.027, kg,            



   PRN Nausea &            



   Vomiting, Start            



   date: 18            



   10:43:00 CDT,            



   Duration: 30            



   day, Stop date:            



   18            



   10:42:00            



   CDTNotes: (Same            



   as: Phenergan)            



               



               

 

 Dilaudid  2 mg, Route:    Inactive         [place]



   IVP, ONCE,          2018  Medical



   Dosing Weight            Center



   127.027, kg,            



   Priority: STAT,            



   Start date:            



   18            



   10:43:00 CDT,            



   Stop date:            



   18            



   10:43:00 CDT            



               



               

 

 Docusate  100 mg, 1 cap,    No Longer        [facility]



   Route: PO, Drug    Active      2018  Medical



   form: CAP, BID,            Center



   Dosing Weight            



   127.27, kg,            



   Start date:            



   18            



   9:00:00 CDT,            



   Duration: 30            



   day, Stop date:            



   18            



   17:00:00            



   CDTNotes: (Same            



   as: Colace) (Do            



   Not Crush)            



               



               

 

 Zinc Sulfate  220 mg, 1 cap,    No Longer        [facility]



   Route: PO, Drug    Active      2018  Medical



   form: CAP,            Center



   Daily, Dosing            



   Weight 127.27,            



   kg, Start date:            



   18            



   9:00:00 CDT,            



   Duration: 30            



   day, Stop date:            



   18            



   9:00:00            



   CDTNotes: (Zinc            



   sulfate            



   capsule) - 220            



   mg Zinc            



   sulfate=50 mg            



   elemental zinc            



   Same as Zinc            



   Sulfate            



               

 

 ascorbic acid  500 mg, 1 tab,    No Longer        [facility]



   Route: PO, Drug    Active        Medical



   form: TAB, BID,            Center



   Dosing Weight            



   127.27, kg,            



   Start date:            



   18            



   9:00:00 CDT,            



   Duration: 30            



   day, Stop date:            



   18            



   17:00:00            



   CDTNotes: (Same            



   as: Vitamin C)            



               



               

 

 multivitamin  1 tab, Route:    No Longer        [facility] [place]



   PO, Drug Form:    Active      2018  Medical



   TAB, Dosing            Center



   Weight 127.27,            



   kg, Daily,            



   Start date:            



   18            



   9:00:00 CDT,            



   Duration: 30            



   day, Stop date:            



   18            



   9:00:00            



   CDTNotes: (Same            



   as:Thera)            



   WASTE: F/P -            



   Black; E -            



   Municipal Trash            



   Bin  Take with            



   food.            



               

 

 Naproxen  500 mg, 1 tab,    Inactive       [place]



   Route: PO, Drug            Medical



   form: TAB,            Center



   X00Loco, Dosing            



   Weight 127.27,            



   kg, Start date:            



   18            



   2:00:00 CDT,            



   Duration: 30            



   day, Stop date:            



   18            



   14:00:00            



   CDTNotes: (Same            



   as: Naprosyn)            



   Take with food.            



               



               

 

 Zosyn  3.375 gm,    No Longer        [facility]



   Route: IVPB,    Active      2018  Medical



   Drug form:            Center



   PDR/INJ,            



   ABXQ8H, Dosing            



   Weight 127.27,            



   kg, Start date:            



   18            



   2:00:00 CDT,            



   Stop date:            



   05/10/18            



   10:00:00 CDT,            



   ABX Indication:            



   Skin/Soft            



   Tissue            



   InfectionNotes:            



   (Same as:            



   Zosyn) Dosing            



   based on            



   Piperacillin            



   component   ***            



   MEDICATION            



   WASTE ***            



   Product Size:            



   3375 mg Product            



   Wasted:  ___ mg            



               



               

 

 Vancomycin  1,000 mg,    No Longer         [place]



   Route: IVPB,    Active        Medical



   Drug form: INJ,            Center



   ABXQ8H, Dosing            



   Weight 127.27,            



   kg, Start date:            



   18            



   2:00:00 CDT,            



   Duration: 7            



   day, Stop date:            



   05/10/18            



   18:00:00 CDT,            



   ABX Indication:            



   Skin/Soft            



   Tissue            



   InfectionNotes:            



   TIME CRITICAL            



   MEDICATION            



   (Same As:            



   Vancocin)            



   Infusion rate            



   2001 mg: infuse            



   over 2.5 hours            



   For adult            



   patients only:            



   Round to            



   nearest 250 mg            



   per Medical            



   Staff approval            



    *** MEDICATION            



   WASTE ***            



   Product Size:            



   1000 mg Product            



   Wasted:  ___ mg            



               



               

 

 Enoxaparin  40 mg, 0.4 mL,    No Longer         [place]



   Route: SUB-Q,    Active        Medical



   Drug form: INJ,            Center



   [id number],            



   Dosing Weight            



   127.27, kg,            



   Consider for            



   obese patients,            



   Start date:            



   18            



   2:00:00 CDT,            



   Stop date:            



   18            



   14:00:00            



   CDTNotes: (Same            



   as: Lovenox)            



               



               

 

 Sodium Chloride  1,000 mL, Rate:    No Longer         [place]



 0.9% IV 1,000  125 ml/hr,    Active        Medical



 mL  Infuse over: 8            Center



   hr, Route: IV,            



   Dosing Weight            



   127.27 kg,            



   Total Volume:            



   1,000, Start            



   date: 18            



   1:46:00 CDT,            



   Duration: 30            



   day, Stop date:            



   18            



   1:45:00 CDT,            



   2.44, m2            



               

 

 Saline Flush  10 ml, Route:    No Longer         [place]



 0.9%  IVP, Drug Form:    Active        Medical



   INJ, Dosing            Center



   Weight 127.27,            



   kg, PRN, PRN            



   Line Flush,            



   Start date:            



   18            



   1:46:00 CDT,            



   Duration: 30            



   day, Stop date:            



   18            



   1:45:00            



   CDTNotes: (Same            



   as: BD            



   Posiflush)            



               



               

 

 Acetaminophen  325 mg, 1 tab,    Inactive         [place]



   Route: PO, Drug          2018  Medical



   form: TAB, Q4H,            Center



   Dosing Weight            



   127.27, kg, PRN            



   Pain Score 4-6,            



   Start date:            



   18            



   1:46:00 CDT,            



   Duration: 30            



   day, Stop date:            



   18            



   1:45:00            



   CDTNotes: Do            



   not exceed 4            



   gm/day.  (Same            



   as: Tylenol)            



               



               

 

 Acetaminophen  1 tab, Route:    Inactive         [place]



 325 MG /  PO, Drug Form:          2018  Medical



 Hydrocodone  TAB, Dosing            Center



 Bitartrate 5 MG  Weight 127.27,            



 Oral Tablet  kg, Q4H, PRN            



   Pain Score 4-6,            



   Start date:            



   18            



   1:46:00 CDT,            



   Duration: 30            



   day, Stop date:            



   18            



   1:45:00            



   CDTNotes: (Same            



   as: Norco            



   325/5)  Do not            



   exceed 4gm/day            



   of            



   acetaminophen.            



               



               

 

 Reglan  10 mg, 2 mL,    Inactive         [place]



   Route: IVP,            Medical



   Drug form: INJ,            Center



   ONCE, Dosing            



   Weight 127.273,            



   kg, Priority:            



   STAT, Start            



   date: 18            



   23:27:00 CDT,            



   Stop date:            



   18            



   23:27:00            



   CDTNotes: (Same            



   as: Reglan)            



               



               

 

 Benadryl  25 mg, 0.5 mL,    Inactive         [place]



   Route: IVP,          2018  Medical



   Drug form: INJ,            Center



   ONCE, Dosing            



   Weight 127.273,            



   kg, Priority:            



   STAT, Start            



   date: 18            



   23:27:00 CDT,            



   Stop date:            



   18            



   23:27:00            



   CDTNotes: (Same            



   as: Benadryl)            



               



               

 

 Magnesium  2 gm, 50 mL,    Inactive        [facility]



 Sulfate  Route: IV, Drug            Medical



   form: INJ,            Center



   ONCE, Dosing            



   Weight 127.273,            



   kg, Priority:            



   STAT, Start            



   date: 18            



   23:26:00 CDT,            



   Stop date:            



   18            



   23:26:00            



   CDTNotes:            



   WASTE: F/P -            



   Sink; E -            



   Municipal Trash            



   Bin            



               

 

 Sodium Chloride  1,000 mL, 1000    Inactive        [facility] [place]



 0.9% (Bolus) IV  ml/hr, Infuse          2018  Medical



   Over: 1 hr,            Center



   Route: IV,            



   1,000, Drug            



   form: INJ,            



   ONCE, Priority:            



   STAT, Dosing            



   Weight 127.273            



   kg, Start date:            



   18            



   23:26:00 CDT,            



   Stop date:            



   18            



   23:26:00 CDT            



               



               

 

 Zosyn  4.5 gm, Route:    Inactive         [place]



   IVPB, ONCE,          2018  Medical



   Dosing Weight            Center



   127.273, kg,            



   Priority: STAT,            



   Start date:            



   18            



   23:10:00 CDT,            



   Stop date:            



   18            



   23:10:00 CDT,            



   ABX Indication:            



   Bacteremia            



               



               

 

 Vancomycin  2,000 mg,    Inactive         [place]



   Route: IVPB,          2018  [facility]



   ONCE, Dosing            Center



   Weight 127.273,            



   kg, Priority:            



   STAT, Start            



   date: 18            



   23:10:00 CDT,            



   Stop date:            



   18            



   23:10:00 CDT,            



   ABX Indication:            



   BacteremiaNotes            



   : TIME CRITICAL            



   MEDICATION            



   (Same As:            



   Vancocin)            



   Infusion rate            



   2001 mg: infuse            



   over 2.5 hours            



   For adult            



   patients only:            



   Round to            



   nearest 250 mg            



   per Medical            



   Staff approval            



    *** MEDICATION            



   WASTE ***            



   Product Size:            



   1000 mg Product            



   Wasted:  ___ mg            



               



               

 

 normal saline  1,000 mL, Rate:    No Longer       [place]



 0.9% IV 1,000  75 ml/hr,    Active      2018  Medical



 mL  Infuse over:            Center



   13.3 hr, Route:            



   IV, Dosing            



   Weight 127.273            



   kg, Total            



   Volume: 1,000,            



   Start date:            



   18            



   22:39:00 CDT,            



   Duration: 30            



   day, Stop date:            



   18            



   22:38:00 CDT,            



   2.36, m2            



               

 

 Acetaminophen  1,000 mg, 2    Inactive         [place]



   tab, Route: PO,          2018  Medical



   Drug form: TAB,            Center



   ONCE, Dosing            



   Weight 127.273,            



   kg, Start date:            



   18            



   21:56:00 CDT,            



   Stop date:            



   18            



   21:56:00            



   CDTNotes: Max            



   acetaminophen            



   4000 mg/day (4            



   gm/day).  (Same            



   as: Tylenol            



   Extra Strength)            



               



               

 

 Rocephin  1 gm, Route:    Inactive      05/04[facility]



   IVP, Drug form:          2018  Medical



   PDR/INJ, ONCE,            Center



   Dosing Weight            



   127.273, kg,            



   Priority: STAT,            



   Start date:            



   18            



   21:21:00 CDT,            



   Stop date:            



   18            



   21:21:00 CDT,            



   ABX Indication:            



   Urinary Tract            



   InfectionNotes:            



   (Same As:            



   Rocephin).            



   *** MEDICATION            



   WASTE ***            



   Product Size:            



   1000 mg Product            



   Wasted:  _0__            



   mg            



               

 

 Morphine  4 mg, Route:    Inactive      [facility]



   IVP, ONCE,          2018  Medical



   Dosing Weight            Center



   127.273, kg,            



   Priority: STAT,            



   Start date:            



   18            



   19:05:00 CDT,            



   Stop date:            



   18            



   19:05:00 CDT            



               



               

 

 Benadryl  25 mg, 0.5 mL,    Inactive      [facility]



   Route: IVP,            Medical



   Drug form: INJ,            Center



   ONCE, Dosing            



   Weight 127.273,            



   kg, Priority:            



   STAT, Start            



   date: 18            



   18:14:00 CDT,            



   Stop date:            



   18            



   18:14:00            



   CDTNotes: (Same            



   as: Benadryl)            



               



               

 

 Benadryl  50 mg, 2 cap,    Inactive      [facility]



   Route: PO, Drug            Medical



   form: CAP,            Center



   ONCE, Dosing            



   Weight 127.273,            



   kg, Priority:            



   STAT, Start            



   date: 18            



   17:56:00 CDT,            



   Stop date:            



   18            



   17:56:00            



   CDTNotes: (Same            



   as: Benadryl)            



               



               

 

 Morphine  4 mg, 1 mL,    Inactive      [facility]



   Route: IVP,            Medical



   Drug form:            Center



   SOLN, ONCE,            



   Dosing Weight            



   127.273, kg,            



   Priority: STAT,            



   Start date:            



   18            



   17:56:00 CDT,            



   Stop date:            



   18            



   17:56:00 CDT            



               



               







Allergies, Adverse Reactions, Alerts







 Substance  Category  Reaction  Severity  Reaction  Status  Date  Comments  
Source



         type    Reported    



                 



                 



                 

 

 amoxicillin  Assertion      Drug  Active      [facility]



                 



                 

 

 morphine  Assertion      Drug  Active      [facility]



                 



                 

 

 Toradol  Assertion      Drug  Active      [facility]



                 



                 

 

 Minocin  Assertion      Drug  Active      [facility]



                 



                 

 

 Zofran  Assertion      Drug  Active      [facility]



                 



                 

 

 Levaquin  Assertion      Drug  Active      [facility]



                 



                 

 

 Bactrim  Assertion      Drug  Active      [facility]



                 



                 







Immunizations







 Immunization  Date Given  Site  Status  Last Updated  Comments  Source



             



             







Results







 Order Name  Results  Value  Reference  Date  Interpretation  Comments  Source



       Range        



               



               



               

 

 CHEM PANEL  B/C Ratio  17  6 - 25        [address]



               



               



               

 

 CHEM PANEL  Globulin  4.3 g/dL  2.7 - 4.2        [address]



               



               



               

 

 CHEM PANEL  A/G Ratio  0.7  0.7 - 1.6        [address]



               



               



               

 

 CHEM PANEL  AGAP  14.4 meq/L  10.0 -        [facility]



       20.0        [facility]



               



               



               

 

 CHEM PANEL  eGFR  113        Result Comment: The eGFR is calculated using 
the CKD-EPI formula. In most young, healthy individuals the eGFR will be >90 mL/
min/1.73m2. The eGFR declines with age. An eGFR of 60-89 may be normal in  [facility] 
[place]



     mL/min/1.    some populations, particularly the elderly, for 
whom the CKD-EPI formula has not been extensively validated. Use of the eGFR is 
not recommended in the following populations:  [address]



             Individuals with unstable creatinine concentrations, including 
pregnant patients and those with serious co-morbid conditions.  



               



             Patients with extremes in muscle mass or diet.  



               



             The data above are obtained from the National Kidney Disease 
Education Program (NKDEP) which additionally recommends that when the eGFR is 
used in patients with extremes of body mass index for purposes  



             of drug dosing, the eGFR should be multiplied by the estimated 
BMI.  

 

 CHEM PANEL  Alk Phos  76 unit/L  39 - 136        [address]



               



               



               

 

 CHEM PANEL  ALT  35 unit/L  0 - 65        [address]



               



               



               

 

 CHEM PANEL  Albumin Lvl  2.8 g/dL  3.5 - 5.0        [address]



               



               



               

 

 CHEM PANEL  Total Protein  7.1 g/dL  6.4 - 8.4        [address]



               



               



               

 

 CHEM PANEL  Calcium Lvl  8.7 mg/dL  8.5 - 10.5        [address]



               



               



               

 

 CHEM PANEL  AST  18 unit/L  0 - 37        [address]



               



               



               

 

 CHEM PANEL  Bili Total  0.3 mg/dL  0.2 - 1.3        [address]



               



               



               

 

 CHEM PANEL  Potassium Lvl  4.4 meq/L  3.5 - 5.1        [address]



               



               



               

 

 CHEM PANEL  Chloride Lvl  109 meq/L  95 - 109  05      [address]



               



               



               

 

 CHEM PANEL  CO2  23 meq/L  24 - 32  05      [address]



               



               



               

 

 CHEM PANEL  Glucose Lvl  114 mg/dL  70 - 99  05      [address]



               



               



               

 

 CHEM PANEL  Creatinine  1.01 mg/dL  0.50 -        [facility]



   Lvl    1.40  /      [facility]



               



               



               

 

 CHEM PANEL  BUN  17 mg/dL  7 - 22        [address]



               



               



               

 

 CHEM PANEL  Sodium Lvl  142 meq/L  135 - 145  05      [address]



               



               



               

 

 HEMATOLOGY  Basophils  0.6 %  0.0 - 1.0        [address]



               



               



               

 

 HEMATOLOGY  Segs-Bands #  4.8 K/CMM  1.5 - 8.1        [address]



               



               



               

 

 HEMATOLOGY  Monocytes #  0.7 K/CMM  0.0 - 0.8        [address]



               



               



               

 

 HEMATOLOGY  Lymphocytes #  1.9 K/CMM  1.0 - 5.5        [address]



               



               



               

 

 HEMATOLOGY  Monocytes  9.4 %  2.0 - 12.0        [address]



               



               



               

 

 HEMATOLOGY  Eosinophils #  0.2 K/CMM  0.0 - 0.5        [address]



               



               



               

 

 HEMATOLOGY  Eosinophils  2.9 %  0.0 - 4.0        [address]



               



               



               

 

 HEMATOLOGY  Segs  62.2 %  45.0 -        [facility]



       75.0        [facility]



               



               



               

 

 HEMATOLOGY  Lymphocytes  24.9 %  20.0 -        [facility]



       40.0        [facility]



               



               



               

 

 HEMATOLOGY  MCH  27.5 pg  27.0 -        [facility]



       31.0        [facility]



               



               



               

 

 HEMATOLOGY  MCV  85.3 fL  80.0 -        [facility]



       94.0        [facility]



               



               



               

 

 HEMATOLOGY  Hct  43.9 %  42.0 -  05      [facility]



       54.0        [facility]



               



               



               

 

 HEMATOLOGY  Hgb  14.2 g/dL  14.0 -        [facility]



       18.0        [facility]



               



               



               

 

 HEMATOLOGY  WBC  7.7 K/CMM  3.7 - 10.4  05      [address]



               



               



               

 

 HEMATOLOGY  RBC  5.15 M/CMM  4.70 -  05       [place]



       6.10        [facility]



               



               



               

 

 HEMATOLOGY  MPV  8.4 fL  7.4 - 10.4         [place]



         /2018      [facility]



               



               



               

 

 HEMATOLOGY  MCHC  32.3 g/dL  32.0 -        [facility]



       36.0        [facility]



               



               



               

 

 HEMATOLOGY  RDW  17.3 %  11.5 -        [facility]



       14.5        [facility]



               



               



               

 

 HEMATOLOGY  Platelet  317 K/CMM  133 - 450         [place]



         [address]



               



               



               

 

 CHEM PANEL  Globulin  4.4 g/dL  2.7 - 4.2         [place]



         [address]



               



               



               

 

 CHEM PANEL  A/G Ratio  0.6  0.7 - 1.6         [place]



         /[address] [place] [address]



               



               



               

 

 CHEM PANEL  B/C Ratio  17  6 - 25         [place]



               [facility]



               



               



               

 

 CHEM PANEL  AGAP  11.3 meq/L  10.0 -        [facility]



       20.      [facility]



               



               



               

 

 CHEM PANEL  eGFR  134        Result Comment: The eGFR is calculated using 
the CKD-EPI formula. In most young, healthy individuals the eGFR will be >90 mL/
min/1.73m2. The eGFR declines with age. An eGFR of 60-89 may be normal in  [facility] 
[place]



     mL/min/1.    some populations, particularly the elderly, for 
whom the CKD-EPI formula has not been extensively validated. Use of the eGFR is 
not recommended in the following populations:  [address]



             Individuals with unstable creatinine concentrations, including 
pregnant patients and those with serious co-morbid conditions.  



               



             Patients with extremes in muscle mass or diet.  



               



             The data above are obtained from the National Kidney Disease 
Education Program (NKDEP) which additionally recommends that when the eGFR is 
used in patients with extremes of body mass index for purposes  



             of drug dosing, the eGFR should be multiplied by the estimated 
BMI.  

 

 CHEM PANEL  Creatinine  0.84 mg/dL  0.50 -        [facility]



   Lvl    1.40        [facility]



               



               



               

 

 CHEM PANEL  Sodium Lvl  142 meq/L  135 - 145         [place]



         /2018      [facility]



               



               



               

 

 CHEM PANEL  Glucose Lvl  99 mg/dL  70 - 99        [address]



               



               



               

 

 CHEM PANEL  BUN  14 mg/dL  7 - 22        [address]



               



               



               

 

 CHEM PANEL  Alk Phos  79 unit/L  39 - 136        [address]



               



               



               

 

 CHEM PANEL  Bili Total  0.3 mg/dL  0.2 - 1.3        [facility]



         [address] [place] [address]



               



               



               

 

 CHEM PANEL  AST  14 unit/L  0 - 37         [place]



         /[address] [place] [address]



               



               



               

 

 CHEM PANEL  ALT  43 unit/L  0 - 65         [place]



         [address]



               



               



               

 

 CHEM PANEL  Total Protein  7.1 g/dL  6.4 - 8.4        [address]



               



               



               

 

 CHEM PANEL  Albumin Lvl  2.7 g/dL  3.5 - 5.0        [address]



               



               



               

 

 CHEM PANEL  Calcium Lvl  9.2 mg/dL  8.5 - 10.5        [address]



               



               



               

 

 CHEM PANEL  CO2  21 meq/L  24 - 32        [address]



               



               



               

 

 CHEM PANEL  Potassium Lvl  4.3 meq/L  3.5 - 5.1        [address]



               



               



               

 

 CHEM PANEL  Chloride Lvl  114 meq/L  95 - 109        [address]



               



               



               

 

 HEMATOLOGY  MCHC  32.5 g/dL  32.0 -        [facility]



       36.0        [facility]



               



               



               

 

 HEMATOLOGY  RDW  17.5 %  11.5 -        [facility]



       14.5        [facility]



               



               



               

 

 HEMATOLOGY  Platelet  400 K/CMM  133 - 450        [address]



               



               



               

 

 HEMATOLOGY  MPV  8.5 fL  7.4 - 10.4        [address]



               



               



               

 

 HEMATOLOGY  WBC  6.6 K/CMM  3.7 - 10.4        [address]



               



               



               

 

 HEMATOLOGY  RBC  5.17 M/CMM  4.70 -        [facility]



       6.10        [facility]



               



               



               

 

 HEMATOLOGY  MCV  86.1 fL  80.0 -        [facility]



       94.0        [facility]



               



               



               

 

 HEMATOLOGY  Hct  44.5 %  42.0 -         [place]



       54.0        [facility]



               



               



               

 

 HEMATOLOGY  MCH  28.0 pg  27.0 -        [facility]



       31.0        [facility]



               



               



               

 

 HEMATOLOGY  Hgb  14.5 g/dL  14.0 -        [facility]



       18.0        [facility]



               



               



               

 

 HEMATOLOGY  Lymphocytes #  1.7 K/CMM  1.0 - 5.5        [address]



               



               



               

 

 HEMATOLOGY  Monocytes #  0.7 K/CMM  0.0 - 0.8  05      [address]



               



               



               

 

 HEMATOLOGY  Eosinophils #  0.2 K/CMM  0.0 - 0.5        [address]



               



               



               

 

 HEMATOLOGY  Lymphocytes  26.1 %  20.0 -        [facility]



       40.0        [facility]



               



               



               

 

 HEMATOLOGY  Segs  59.3 %  45.0 -        [facility]



       75.0        [facility]



               



               



               

 

 HEMATOLOGY  Basophils  0.8 %  0.0 - 1.0        [address]



               



               



               

 

 HEMATOLOGY  Monocytes  10.5 %  2.0 - 12.0        [address]



               



               



               

 

 HEMATOLOGY  Eosinophils  3.3 %  0.0 - 4.0        [address]



               



               



               

 

 HEMATOLOGY  Segs-Bands #  3.9 K/CMM  1.5 - 8.1        [address]



               



               



               

 

 CHEM PANEL  Glucose Lvl  74 mg/dL  70 - 99        [address]



               



               



               

 

 CHEM PANEL  BUN  12 mg/dL  7 - 22        [address]



               



               



               

 

 CHEM PANEL  Creatinine  0.75 mg/dL  0.50 -        [facility]



   Lvl    1.40  /      [facility]



               



               



               

 

 CHEM PANEL  eGFR  140        Result Comment: The eGFR is calculated using 
the CKD-EPI formula. In most young, healthy individuals the eGFR will be >90 mL/
min/1.73m2. The eGFR declines with age. An eGFR of 60-89 may be normal in  [facility] 
[place]



     mL/min/1.7        some populations, particularly the elderly, for 
whom the CKD-EPI formula has not been extensively validated. Use of the eGFR is 
not recommended in the following populations:  [address]



             Individuals with unstable creatinine concentrations, including 
pregnant patients and those with serious co-morbid conditions.  



               



             Patients with extremes in muscle mass or diet.  



               



             The data above are obtained from the National Kidney Disease 
Education Program (NKDEP) which additionally recommends that when the eGFR is 
used in patients with extremes of body mass index for purposes  



             of drug dosing, the eGFR should be multiplied by the estimated 
BMI.  

 

 CHEM PANEL  Albumin Lvl  2.9 g/dL  3.5 - 5.0        [address]



               



               



               

 

 CHEM PANEL  Globulin  4.4 g/dL  2.7 - 4.2        [address]



               



               



               

 

 CHEM PANEL  A/G Ratio  0.7  0.7 - 1.6        [address]



               



               



               

 

 CHEM PANEL  Bili Total  0.4 mg/dL  0.2 - 1.3        [address]



               



               



               

 

 CHEM PANEL  Alk Phos  71 unit/L  39 - 136        [address]



               



               



               

 

 CHEM PANEL  AST  15 unit/L  0 - 37  05      [facility]



         [address] [place] [address]



               



               



               

 

 CHEM PANEL  ALT  28 unit/L  0 - 65  05      [address]



               



               



               

 

 CHEM PANEL  Potassium Lvl  4.4 meq/L  3.5 - 5.1        [address]



               



               



               

 

 CHEM PANEL  Sodium Lvl  147 meq/L  135 - 145  05      [address]



               



               



               

 

 CHEM PANEL  CO2  25 meq/L  24 - 32  05      [address]



               



               



               

 

 CHEM PANEL  Chloride Lvl  114 meq/L  95 - 109  05      [address]



               



               



               

 

 CHEM PANEL  B/C Ratio  16  6 - 25        [address]



               



               



               

 

 CHEM PANEL  Calcium Lvl  8.7 mg/dL  8.5 - 10.5        [address]



               



               



               

 

 CHEM PANEL  AGAP  12.4 meq/L  10.0 -        [facility]



       20.0        [facility]



               



               



               

 

 CHEM PANEL  Total Protein  7.3 g/dL  6.4 - 8.4        [address]



               



               



               

 

 HEMATOLOGY  Platelet  345 K/CMM  133 - 450  05      [facility]



         [address] [place] [address]



               



               



               

 

 HEMATOLOGY  MPV  8.7 fL  7.4 - 10.4        [address]



               



               



               

 

 HEMATOLOGY  WBC  6.9 K/CMM  3.7 - 10.4        [address]



               



               



               

 

 HEMATOLOGY  RBC  5.30 M/CMM  4.70 -        [facility]



       6.10        [facility]



               



               



               

 

 HEMATOLOGY  MCHC  32.8 g/dL  32.0 -         [place]



       36.0        [facility]



               



               



               

 

 HEMATOLOGY  MCH  27.9 pg  27.0 -         [place]



       31.0        [facility]



               



               



               

 

 HEMATOLOGY  Hgb  14.8 g/dL  14.0 -        [facility]



       18.0        [facility]



               



               



               

 

 HEMATOLOGY  MCV  85.0 fL  80.0 -        [facility]



       94.0        [facility]



               



               



               

 

 HEMATOLOGY  Hct  45.1 %  42.0 -        [facility]



       54.0        [facility]



               



               



               

 

 HEMATOLOGY  RDW  17.5 %  11.5 -        [facility]



       14.5        [facility]



               



               



               

 

 HEMATOLOGY  Eosinophils #  0.2 K/CMM  0.0 - 0.5  05      [facility]



         [address] [place] [address]



               



               



               

 

 HEMATOLOGY  Lymphocytes #  2.0 K/CMM  1.0 - 5.5  05/06      [address]



               



               



               

 

 HEMATOLOGY  Monocytes #  0.7 K/CMM  0.0 - 0.8        [address]



               



               



               

 

 HEMATOLOGY  Eosinophils  2.4 %  0.0 - 4.0  05      [address]



               



               



               

 

 HEMATOLOGY  Basophils  0.6 %  0.0 - 1.0        [address]



               



               



               

 

 HEMATOLOGY  Segs-Bands #  4.0 K/CMM  1.5 - 8.1        [address]



               



               



               

 

 HEMATOLOGY  Monocytes  10.4 %  2.0 - 12.0        [address]



               



               



               

 

 HEMATOLOGY  RBC Morph  Normal          [facility]



               [facility]



     (18 2:07 AM)          [facility]



               



               



               

 

 HEMATOLOGY  Segs  58.1 %  45.0 -        [facility]



       75.0        [facility]



               



               



               

 

 HEMATOLOGY  Plt Morph  Normal          [address]



     (18 2:07 AM)          [facility]



               



               



               

 

 HEMATOLOGY  Lymphocytes  28.5 %  20.0 -        [facility]



       40.0        [facility]



               



               



               

 

 HEMATOLOGY  Basophils #  0.1 K/CMM  0.0 - 0.2        [address]



               



               



               

 

 HEMATOLOGY  Polychrom  Moderate  None Seen         [place]



         /2018      [facility]



     *ABN*          Center



               



     (18 11:07 AM)          



               

 

 CHEM PANEL  Magnesium Lvl  2.3 mg/dL  1.8 - 2.4        [address]



               



               



               

 

 CHEM PANEL  Phosphorus  3.5 mg/dL  2.5 - 4.5        [address]



               



               



               

 

 HEMATOLOGY  PT  14.0 s  12.0 -        [facility]



       14.7        [facility]



               



               



               

 

 HEMATOLOGY  PTT  37.6 s  22.9 -        [facility]



       35.8        [facility]



               



               



               

 

 HEMATOLOGY  INR  1.08  0.85 -         [place]



       1.17        [facility]



               



               



               

 

 IMMUNOLOGY  C-REACTIVE  13.1 mg/L  <=2.9 mg/L        [facility]



   PROTEIN            [facility]



               



               



               

 

 IMMUNOLOGY  Prealbumin  25.2 mg/dL  18.0 -  05/       [place]



       45.0        [facility]



               



               



               

 

 CHEM PANEL  Lactic Acid  0.9 mMol/L  0.5 - 2.2        [facility]



   Lvl      /      [facility]



               



               



               

 

 HEMATOLOGY  Sed Rate  5 mm/h  0 - 15        [address]



               



               



               

 

 IMMUNOLOGY  C-REACTIVE  15.8 mg/L  <=2.9 mg/L  05      [facility]



   PROTEIN      /      [facility]



               



               



               

 

 HEMATOLOGY  PT  13.0 s  12.0 -         [place]



       14.7  /      [facility]



               



               



               

 

 HEMATOLOGY  INR  0.98  0.85 -         [place]



       1.17        [facility]



               



               



               

 

 HEMATOLOGY  PTT  33.2 s  22.9 -         [place]



       35.8        [facility]



               



               



               

 

 BLOOD BANK  Antibody Scrn  Negative          [facility]



 RESULTS              [facility]



     (5/3/18 6:51 PM)          [facility]



               



               



               

 

 BLOOD BANK  ABO/Rh  AB POS          [facility]



 RESULTS              [facility]



               



               



               

 

 URINE AND  UA  0.2 EU/dL  0.1 - 1.0        [facility]  Urobilinogen      /      [facility]



               



               



               

 

 URINE AND  UA Nitrite  Negative  Negative        [facility]              [facility]



     (5/3/18 6:35 PM)          [facility]



               



               



               

 

 URINE AND  UA Glucose  Negative  Negative        [facility]              [facility]



     (5/3/18 6:35 PM)          [facility]



               



               



               

 

 URINE AND  UA Ketones  Negative  Negative        [facility]



 STOOL        /2018      Medical



     *NA*          [facility]



               



     (5/3/18 6:35 PM)          



               

 

 URINE AND  UA Bili  Negative  Negative        [facility]



 STOOL        /2018      Medical



     *NA*          [facility]



               



     (5/3/18 6:35 PM)          



               

 

 URINE AND  UA Blood  Trace  Negative        [facility]



 STOOL        /2018      Medical



     *ABN*          [facility]



               



     (5/3/18 6:35 PM)          



               

 

 URINE AND  UA Leuk Est  Small  Negative        [facility]



 STOOL        /2018      Medical



     *ABN*          [facility]



               



     (5/3/18 6:35 PM)          



               

 

 URINE AND  UA Spec Grav  1.020  <=1.030        [facility]      [facility]



               



               



               

 

 URINE AND  UA pH  6.0  5.0 - 8.0        [facility]



 STOOL        [address]



               



               



               

 

 URINE AND  UA Color  Yellow  Yellow        [facility]



 STOOL        /2018      Medical



     *NA*          [facility]



               



     (5/3/18 6:35 PM)          



               

 

 URINE AND  UA Protein  Trace  Negative        [facility]



 STOOL        /2018      Medical



     *ABN*          [facility]



               



     (5/3/18 6:35 PM)          



               

 

 URINE AND  UA Turbidity  Slight Cloudy  Clear        [facility]



 STOOL              [facility]



     (5/3/18 6:35 PM)          [facility]



               



               



               

 

 URINE AND  UA Hyal Cast  0-2  0 - 2        [facility]



 STOOL              [facility]



     (5/3/18 6:35 PM)          [facility]



               



               



               

 

 URINE AND  UA Bacteria  Occasional  None Seen        [facility]



 STOOL    /HPF  /HPF  /      [facility]



               



               



               

 

 URINE AND  UA RBC  11-20 /HPF  0 - 2        [address]



               



               



               

 

 URINE AND  UA Sq Epi  Occasional  Few /LPF        [facility]



 STOOL    /LPF    /[address] [place] [address]



               



               



               

 

 URINE AND  UA WBC    None Seen        [facility]



 STOOL    /HPF  /HPF  /      [facility]



               



               



               

 

 HEMATOLOGY  Basophils #  0.1 K/CMM  0.0 - 0.2        [address]



               



               



               

 

 HEMATOLOGY  Polychrom  Slight          [address]



               



               



               

 

 HEMATOLOGY  Plt Morph  Normal          [facility]      [facility]



     (5/3/18 6:20 PM)          [facility]



               



               



               

 

 Brain shunt  Brain shunt  EXAM: SKULL 2 VIEWS        -  [facility] [place]



 series DX  series DX          -  Medical



     EXAM: CHEST 2 VIEWS        This report was dictated by a Radiology Resident
/Fellow.  I have personally reviewed the images as  Center



             well as the Resident's interpretation and agree with the findings.
  



     EXAM: ABDOMEN 2 VIEWS        Read by:  Bernardo Lorenz MD        
  Resident:  Bernardo Lorenz MD  



             Dictated Date/time:  18 20:33  



             Electronically Signed by:  Martin Phillips MD                      
   18 22:11  



             FINAL REPORT  



     DATE: 5/3/2018 7:20 PM CDT          



               



               



               



     INDICATION: Headache. - Please include Codman view for shunt setting.     
     



               



               



               



     COMPARISON: Brain shuntogram 2013          



               



               



               



     TECHNIQUE: AP and lateral views of the skull, chest and abdomen.          



               



               



               



     FINDINGS:          



               



     There is a single intact shunt tube with the proximal aspect in the right 
frontal calvarium coursing across midline to the left anterior chest and 
abdomen with the tip coiled within the pelvis.          



               



               



               



     A right parietal shunt with distal tip in the right lateral abdomen is 
discontinuous. Another shunt present with proximal tip in the right neck base 
and distal tip in the medial mid abdomen          



               



     Cholecystectomy clips are noted. The lungs are clear but underinflated. 
Appearance of the pelvis is unchanged with bilateral shallow acetabular roofs. 
No obvious posterior dislocation. Spinal dysraphism          



      is seen with absence of the spinous process from L3 to S1.          



               



               



               



     IMPRESSION:          



               



               



               



     1. No significant change. The right transfrontal shunt catheter is intact 
along its course with the distal tip in the pelvis.          



               



     2. Discontinuous right parieto-occipital catheter and 2 discontinuous 
catheters in the right chest and abdomen are unchanged.          



               



     3. Spinal dysraphism.          



               



               



               



               

 

 Brain wo  Brain wo  EXAM: CT BRAIN WITHOUT CONTRAST        -  [facility]



 contrast CT  contrast CT      /    -  Medical



             This report was dictated by a Radiology Resident/Fellow.  I have 
personally reviewed the images as  Center



             well as the Resident's interpretation and agree with the findings.
  



     DATE: 5/3/2018 627 PM CDT        Read by:  Bernardo Lorenz MD    
      Resident:  Bernardo Lorenz MD  



             Dictated Date/time:  18 18:45  



             Electronically Signed by:  Martin Phillips MD                      
   18 21:12  



             FINAL REPORT  



     INDICATION: 32-year-old male patient with history of  shunt malfunction 
         



               



               



               



     COMPARISON: CT of the brain 2015, 2013.          



               



               



               



     TECHNIQUE: Axial CT images of the brain were obtained. Sagittal and 
coronal reformats.          



               



     IV contrast: None.          



               



               



               



     FINDINGS:          



               



     An orphaned right occipital approach  shunt is present. Also present is 
a right frontal approach  shunt with tip in the left lateral ventricle.      
    



               



     Narrowing of the lateral ventricles is present, unchanged from 2015. 
         



               



     There is mild uncal and cerebellar tonsillar herniation, also unchanged.  
        



               



     No recent hemorrhage or territorial infarct. No mass. No midline shift.   
       



               



     No scalp fluid collections.          



               



     Sinuses are clear.          



               



               



               



     IMPRESSION:          



               



     No acute intracranial abnormality.          



               



     Adequately decompressed ventricular system.          



               



               



               



               

 

 Chest 1view  Chest 1view  EXAM: XR CHEST 1 VIEW        -  [facility]



 DX  DX      /2018    -  Medical



             This report was dictated by a Radiology Resident/Fellow.  I have 
personally reviewed the images as  Center



             well as the Resident's interpretation and agree with the findings.
  



     DATE: 5/3/2018 6:12 PM CDT        Read by:  Olvin Palacio MD          
       Resident:  Olvin Palacio MD  



             Dictated Date/time:  18 18:31  



             Electronically Signed by:  Bakari Interiano MD              
   18 19:29  



             FINAL REPORT  



     INDICATION:  - picc line use          



               



               



               



     UT SECTION: ER          



               



               



               



     COMPARISON: None.          



               



               



               



     TECHNIQUE: AP chest          



               



               



               



     FINDINGS:          



               



     Lines, tubes and hardware:          



               



     Left PICC tip at mid SVC.          



               



               



               



     Lungs and pleura: No pulmonary or pleural based abnormality is identified. 
Pulmonary vascularity is normal.          



               



               



               



     Heart and mediastinum: The heart size is normal for technique. The 
mediastinal contours are normal.          



               



               



               



     Bones: No acute bony abnormality is identified.          



               



               



               



     Upper abdomen: Normal.          



               



               



               



               



               



     IMPRESSION:          



               



     Left PICC tip at mid SVC.          



               



               



               



     No acute cardiopulmonary abnormality is observed.          



               



               



               



               







Vital Signs







 Vital Sign  Value  Date  Comments  Source



         

 

 Temperature Oral (F)  97.2 F  2018    [facility]



         

 

 Systolic (mm Hg)  127  2018    [facility]



         

 

 Diastolic (mm Hg)  85  2018    [facility]



         

 

 Heart Rate  81  2018    [facility]



         

 

 Respitory Rate  18  2018    [facility]



         

 

 Heart Rate  90  2018    [facility]



         

 

 Systolic (mm Hg)  106  2018    [facility]



         

 

 Diastolic (mm Hg)  71  2018    [facility]



         

 

 Respitory Rate  18  2018    [facility]



         

 

 Temperature Oral (F)  98.1 F  2018    [facility]



         

 

 Respitory Rate  18  2018    [facility]



         

 

 Systolic (mm Hg)  168  2018    [facility]



         

 

 Diastolic (mm Hg)  107  2018    [facility]



         

 

 Heart Rate  87  2018    [facility]



         

 

 Temperature Oral (F)  98.1 F  2018    [facility]



         

 

 Weight  127.027  2018    [facility]



         

 

 Weight  127.027  2018    [facility]



         

 

 Weight  127.027  2018    [facility]



         

 

 BMI Calculated  48.16  2018    [facility]



         

 

 Height  162.56 cm  2018    [facility]



         

 

 Height  149.86 cm  2018    [facility]



         

 

 BMI Calculated  56.67  2018    [facility]



         







Encounters







 Location  Location  Encounter  Encounter  Reason  Attending  ADM  DC  Status  
Source



   Details  Type  Number  For  Provider  Date  Date    



         Visit          



                   



                   



                   

 

 [facility]  [id number]    [facility]      [facility]



 Jose Ulloah      [facility]



                   



                   



                   







Procedures







 Procedure  Code  Date  Perfomer  Comments  Source



           



           

 

 Cholecystectomy  [id number]        [facility]



           

 

 Shunt of cerebral  [id number]        [facility] [place]



 ventricle to          Medical



 extracranial site          Center

## 2018-11-07 NOTE — XMS REPORT
[facility]

 Created on:2018



Patient:Redd Dale

Sex:Male

:1985

External Reference #:[id number]





Demographics







 Address  [address]  [place] [address]

 

 Phone  [phone]

 

 Preferred Language  en

 

 Marital Status  Unknown

 

 Evangelical Affiliation  Unknown

 

 Race  Black or 

 

 Ethnic Group  Unknown









Author







 Organization  [employer]









Care Team Providers







 Name  Role  Phone

 

 Knox, Na  Provider Role  Unavailable









Allergies, Adverse Reactions, Alerts







 Substance  Reaction  Event Type

 

 Zofran  Info Not Available  Drug Allergy

 

 Morphine Sulfate ER  Info Not Available  Drug Allergy

 

 Levaquin  Info Not Available  Drug Allergy







Problems







 Problem Type  Condition  Code  Onset Dates  Condition Status

 

 Assessment  Mental disorder, not otherwise  F99    Active



   specified      

 

 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



   disorder      

 

 Assessment  Ulcer of left foot, unspecified  L97.529    Active



   ulcer stage      

 

 Problem  Foot ulcer  L97.509    Active

 

 Assessment  Nonintractable episodic headache,  R51    Active



   unspecified headache type      

 

 Problem  Constipation  K59.00    Active

 

 Assessment  Episodic tension-type headache, not  G44.219    Active



   intractable      

 

 Problem  Paraplegia  G82.20    Active

 

 Problem  Incontinence of urine  R32    Active

 

 Problem  Nausea alone  R11.0    Active

 

 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



   disorder      

 

 Problem  Mental disorder, not otherwise  F99    Active



   specified      

 

 Assessment  Bipolar depression  F31.30    Active

 

 Assessment  Lumbar spina bifida with  Q05.2    Active



   hydrocephalus      

 

 Problem  Bipolar depression  F31.30    Active

 

 Assessment   (ventriculoperitoneal) shunt  Z98.2    Active



   status      

 

 Problem  Depression with anxiety  F41.8    Active

 

 Problem  Fecal incontinence  R15.9    Active

 

 Problem  Nausea and vomiting, intractability  R11.2    Active



   of vomiting not specified,      



   unspecified vomiting type      

 

 Problem  Ulcer of left foot, unspecified  L97.529    Active



   ulcer stage      

 

 Problem  Episodic tension-type headache, not  G44.219    Active



   intractable      

 

 Problem  Nonintractable episodic headache,  R51    Active



   unspecified headache type      

 

 Assessment  Depression with anxiety  F41.8    Active

 

 Problem  Dependence on wheelchair  Z99.3    Active

 

 Problem  Lumbar spina bifida with  Q05.2    Active



   hydrocephalus      

 

 Problem   (ventriculoperitoneal) shunt  Z98.2    Active



   status      

 

 Problem  Nicotine dependence  F17.200    Active







Medications







 Medication  Code  Code  Instructions  Start  End  Status  Dosage



   System      Date  Date    

 

 Collagenase  NDC  [address]-60  250 UNIT/GM      Active  1 application



       Externally        to affected



       Once a day        area

 

 Macrobid  NDC  22951773665  100 MG Orally      Active  1 capsule



       every 12 hrs        with food

 

 Tylenol with  NDC  53251024124  300-60 MG      Active  1 tablet as



 Codeine #4      Orally every 6        needed



       hrs        

 

 Citalopram  NDC  91548251724  10 MG Orally  July    Active  1 tablet



 Hydrobromide      Once a day  2018      

 

 Pantoprazole  NDC  43700420245  40 MG Orally      Active  1 tablet



 Sodium      Once a day        

 

 Seroquel  NDC  14740698188  25 MG Orally  July    Active  1 tablet



       Once a day at  16,      



       bedtime        







Results

No Known Results



Summary Purpose

[employer] Submission

## 2018-11-07 NOTE — XMS REPORT
[facility]

 Created on:September 15, 2018



Patient:Redd Dale

Sex:Male

:1985

External Reference #:[id number]





Demographics







 Address  [address] [place] [address]

 

 Phone  [phone]

 

 Preferred Language  en

 

 Marital Status  Unknown

 

 [employer] Affiliation  Unknown

 

 Race  Black or 

 

 Ethnic Group  Unknown









Author







 Organization  [employer]









Care Team Providers







 Name  Role  Phone

 

 Knox, Na  Provider Role  Unavailable









Allergies, Adverse Reactions, Alerts







 Substance  Reaction  Event Type

 

 Zofran  Info Not Available  Drug Allergy

 

 Morphine Sulfate ER  Info Not Available  Drug Allergy

 

 Levaquin  Info Not Available  Drug Allergy







Problems







 Problem Type  Condition  Code  Onset Dates  Condition Status

 

 Assessment  Blood tests for routine general  Z00.00    Active



   physical examination      

 

 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



   disorder      

 

 Problem  Constipation  K59.00    Active

 

 Assessment  Ulcer of left foot, unspecified  L97.529    Active



   ulcer stage      

 

 Problem  Paraplegia  G82.20    Active

 

 Assessment   (ventriculoperitoneal) shunt  Z98.2    Active



   status      

 

 Problem  Nausea alone  R11.0    Active

 

 Problem  Fecal incontinence  R15.9    Active

 

 Problem  Incontinence of urine  R32    Active

 

 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



   disorder      

 

 Problem  Mental disorder, not otherwise  F99    Active



   specified      

 

 Assessment  Depression with anxiety  F41.8    Active

 

 Assessment  Bipolar depression  F31.30    Active

 

 Problem  Bipolar depression  F31.30    Active

 

 Assessment  Lumbar spina bifida with  Q05.2    Active



   hydrocephalus      

 

 Problem  Blood tests for routine general  Z00.00    Active



   physical examination      

 

 Problem  Depression with anxiety  F41.8    Active

 

 Problem  Nausea and vomiting, intractability  R11.2    Active



   of vomiting not specified,      



   unspecified vomiting type      

 

 Problem  Ulcer of left foot, unspecified  L97.529    Active



   ulcer stage      

 

 Problem  Nonintractable episodic headache,  R51    Active



   unspecified headache type      

 

 Problem   (ventriculoperitoneal) shunt  Z98.2    Active



   status      

 

 Problem  Episodic tension-type headache, not  G44.219    Active



   intractable      

 

 Problem  Lumbar spina bifida with  Q05.2    Active



   hydrocephalus      

 

 Problem  Foot ulcer  L97.509    Active

 

 Problem  Nicotine dependence  F17.200    Active

 

 Problem  Dependence on wheelchair  Z99.3    Active







Medications







 Medication  Code  Code  Instructions  Start  End  Status  Dosage



   System      Date  Date    

 

 Macrobid  NDC  02210651419  100 MG Orally      Active  1 capsule



       every 12 hrs        with food

 

 Tylenol with  NDC  36093082578  300-60 MG      Active  1 tablet as



 Codeine #4      Orally every 6        needed



       hrs        

 

 Pantoprazole  NDC  54811368634  40 MG Orally      Active  1 tablet



 Sodium      Once a day        

 

 Citalopram  NDC  09226739517  10 MG Orally      Active  1 tablet



 Hydrobromide      Once a day        

 

 Collagenase  NDC  [address]-13  250 UNIT/GM      Active  1 application



       Externally        to affected



       Once a day        area

 

 Seroquel  NDC  14710313183  25 MG Orally      Active  1 tablet



       Once a day at        



       bedtime        







Results

No Known Results



Summary Purpose

[employer] Submission

## 2018-11-07 NOTE — XMS REPORT
[facility]

 Created on:2018



Patient:Redd Dale

Sex:Male

:1985

External Reference #:[id number]





Demographics







 Address  [address] [place] [address]

 

 Phone  [phone]

 

 Preferred Language  en

 

 Marital Status  Unknown

 

 [employer] Affiliation  Unknown

 

 Race  Black or 

 

 Ethnic Group  Not  or 









Author







 Organization  [employer]









Care Team Providers







 Name  Role  Phone

 

 Knox, Na  Provider Role  Unavailable









Allergies

No Known Allergies



Problems







 Problem Type  Condition  Code  Onset Dates  Condition Status

 

 Problem  Nicotine dependence  F17.200    Active

 

 Problem  Lumbar spina bifida with  Q05.2    Active



   hydrocephalus      

 

 Problem  Dependence on wheelchair  Z99.3    Active

 

 Problem  Fecal incontinence  R15.9    Active

 

 Problem  Foot ulcer  L97.509    Active

 

 Problem  Depression with anxiety  F41.8    Active

 

 Problem  Paraplegia  G82.20    Active

 

 Problem  Constipation  K59.00    Active

 

 Problem  Incontinence of urine  R32    Active

 

 Problem  Nausea alone  R11.0    Active

 

 Problem  Episodic tension-type headache, not  G44.219    Active



   intractable      

 

 Problem  Nonintractable episodic headache,  R51    Active



   unspecified headache type      

 

 Problem   (ventriculoperitoneal) shunt  Z98.2    Active



   status      







Medications

No Known Medications



Results

No Known Results



Summary Purpose

[employer] Submission

## 2018-11-08 VITALS — OXYGEN SATURATION: 96 % | SYSTOLIC BLOOD PRESSURE: 112 MMHG | DIASTOLIC BLOOD PRESSURE: 90 MMHG

## 2018-11-08 LAB
ALBUMIN SERPL BCP-MCNC: 3.4 G/DL (ref 3.4–5)
ALP SERPL-CCNC: 105 U/L (ref 45–117)
ALT SERPL W P-5'-P-CCNC: 24 U/L (ref 12–78)
AST SERPL W P-5'-P-CCNC: 15 U/L (ref 15–37)
BUN BLD-MCNC: 16 MG/DL (ref 7–18)
GLUCOSE SERPLBLD-MCNC: 90 MG/DL (ref 74–106)
HCT VFR BLD CALC: 47 % (ref 39.6–49)
INR BLD: 1.02
LYMPHOCYTES # SPEC AUTO: 2.1 K/UL (ref 0.7–4.9)
MCH RBC QN AUTO: 27.4 PG (ref 27–35)
MCV RBC: 81.6 FL (ref 80–100)
PMV BLD: 8.2 FL (ref 7.6–11.3)
POTASSIUM SERPL-SCNC: 4.1 MMOL/L (ref 3.5–5.1)
RBC # BLD: 5.76 M/UL (ref 4.33–5.43)

## 2018-11-08 NOTE — RAD REPORT
EXAM DESCRIPTION:  CT - Head Brain Wo Cont - 11/8/2018 3:32 am

 

CLINICAL HISTORY:  Headache, history of  shunt

 

 A preliminary report was provided at the time of the study and reviewed prior to final report.

 

COMPARISON:  CT head May 2018

 

TECHNIQUE:  Axial 5 mm thick images of the head were obtained without IV contrast.

 

All CT scans are performed using dose optimization technique as appropriate and may include automated
 exposure control or mA/KV adjustment according to patient size.

 

FINDINGS:  No intracranial hemorrhage, mass, edema or shift of mid-line structures. No acute infarcti
on changes seen. There are 2  shunt tube is identifiable. 1 shunt tube enters from a lateral right 
frontal approach. Tip is in the midline frontal horn lateral ventricle region. Second shunt tube ente
rs posterior right parietal with the tip posterior third ventricle region. Positioning matches the Ma
y examination. No ventriculomegaly or other finding to indicate shunt malfunction. Ventricular size m
atches the May study. Ventricles are decompressed. Brain parenchyma is similar to the comparison.

 

Mastoid air cells and visualized portions of the paranasal sinuses are clear.

 

No acute bony findings.

 

 

IMPRESSION:  Negative non-contrast CT head examination for acute finding.

 

No significant change from May 2018.

## 2018-11-08 NOTE — EDPHYS
Physician Documentation                                                                           

 [facility]                                                                

Name: Redd Dale Jr                                                                            

Age: 32 yrs                                                                                       

Sex: Male                                                                                         

: 1985                                                                                   

MRN: [id number]                                                                                   

Arrival Date: 2018                                                                          

Time: 22:48                                                                                       

Account#: [id number]                                                                            

Bed 17                                                                                            

Private MD:                                                                                       

ED Physician Olvin More                                                                      

HPI:                                                                                              

                                                                                             

02:08 This 32 yrs old Black Male presents to ER via EMS with complaints of Headache.          wa  

02:08 The patient complains of pain to the diffuse. The patient describes the headache as     wa  

      aching, a pressure, throbbing. Onset: The symptoms/episode began/occurred 2 day(s) ago.     

      Associated signs and symptoms: Pertinent positives: nausea, Photophobia Pertinent           

      negatives: altered mental status, fever, neck stiffness. Severity of symptoms: At its       

      worst the pain was severe, in the emergency department the pain is actually worse.          

      Headache History: The patient has had previous headaches and this one is similar to         

      previous episodes. The symptoms are alleviated by nothing. the symptoms are aggravated      

      by nothing. The patient has experienced similar episodes in the past, several times.        

      The patient has not recently seen a physician. h/o  shunt. states his neurosurgeon is     

      Dr. Willis at [facility].                                                               

                                                                                                  

Historical:                                                                                       

- Allergies:                                                                                      

                                                                                             

22:55 Amoxicillin;                                                                            ea  

22:55 Bactrim;                                                                                ea  

22:55 Ciprofloxacin;                                                                          ea  

22:55 CLAVULANIC ACID;                                                                        ea  

22:55 Doxycycline;                                                                            ea  

22:55 Levofloxacin;                                                                           ea  

22:55 Morphine;                                                                               ea  

22:55 Toradol;                                                                                ea  

22:55 TRIMETHOPRIM;                                                                           ea  

22:55 Vancomycin;                                                                             ea  

22:55 Zofran;                                                                                 ea  

- Home Meds:                                                                                      

22:55 clindamycin HCl 300 mg Oral cap 1 cap every 6 hours [Active]; oxycodone-acetaminophen   ea  

       mg Oral tab 1 tab every 6 hours [Active]; tramadol 50 mg Oral tab 2 tabs every 6     

      hours [Active];                                                                             

- PMHx:                                                                                           

22:55 Asthma; spina bifida; GERD; Hypertension; Hydrocephalus; decubitus ulcers on feet;      ea  

      cluster headaches; Cerebral Palsy;                                                          

                                                                                                  

- Immunization history:: Adult Immunizations up to date.                                          

- Social history:: Smoking status: Patient uses tobacco products, smokes one-half pack            

  cigarettes per day.                                                                             

- Ebola Screening: : No symptoms or risks identified at this time.                                

- Family history:: not pertinent, pertinent for.                                                  

- Hospitalizations: : No recent hospitalization is reported.                                      

                                                                                                  

                                                                                                  

ROS:                                                                                              

                                                                                             

02:11 Constitutional: Negative for fever, chills, and weight loss, Eyes: Negative for injury, wa  

      pain, redness, and discharge, ENT: Negative for injury, pain, and discharge, Neck:          

      Negative for injury, pain, and swelling, Cardiovascular: Negative for chest pain,           

      palpitations, and edema, Respiratory: Negative for shortness of breath, cough,              

      wheezing, and pleuritic chest pain, Abdomen/GI: Negative for abdominal pain, nausea,        

      vomiting, diarrhea, and constipation, Back: Negative for injury and pain, : Negative      

      for injury, bleeding, discharge, and swelling, MS/Extremity: Negative for injury and        

      deformity, Skin: Negative for injury, rash, and discoloration.                              

      Neuro: Positive for headache, Negative for altered mental status.                           

      All other systems are negative.                                                             

                                                                                                  

Exam:                                                                                             

02:11 Constitutional:  This is a well developed, well nourished patient who is awake, alert,  wa  

      and in no acute distress. Head/Face:  Normocephalic, atraumatic. Eyes:  Pupils equal        

      round and reactive to light, extra-ocular motions intact.  Lids and lashes normal.          

      Conjunctiva and sclera are non-icteric and not injected.  Cornea within normal limits.      

      Periorbital areas with no swelling, redness, or edema. ENT:  Nares patent. No nasal         

      discharge, no septal abnormalities noted.  Tympanic membranes are normal and external       

      auditory canals are clear.  Oropharynx with no redness, swelling, or masses, exudates,      

      or evidence of obstruction, uvula midline.  Mucous membranes moist. Neck:  Trachea          

      midline, no thyromegaly or masses palpated, and no cervical lymphadenopathy.  Supple,       

      full range of motion without nuchal rigidity, or vertebral point tenderness.  No            

      Meningismus. Cardiovascular:  Regular rate and rhythm with a normal S1 and S2.  No          

      gallops, murmurs, or rubs.  Normal PMI, no JVD.  No pulse deficits. Respiratory:  Lungs     

      have equal breath sounds bilaterally, clear to auscultation and percussion.  No rales,      

      rhonchi or wheezes noted.  No increased work of breathing, no retractions or nasal          

      flaring. Abdomen/GI:  Soft, non-tender, with normal bowel sounds.  No distension or         

      tympany.  No guarding or rebound.  No evidence of tenderness throughout. Back:  No          

      spinal tenderness.  No costovertebral tenderness.  Full range of motion. Skin:  Warm,       

      dry with normal turgor.  Normal color with no rashes, no lesions, and no evidence of        

      cellulitis. MS/ Extremity:  Pulses equal, no cyanosis.  Neurovascular intact.  Full,        

      normal range of motion.                                                                     

02:11 Neuro: Orientation: is normal, Mentation: is normal, Cranial nerves: grossly normal,        

      Motor: is normal.                                                                           

                                                                                                  

Vital Signs:                                                                                      

                                                                                             

22:40  / 92; Pulse 88; Resp 18; Temp 97.2; Pulse Ox 97% on R/A; Weight 131.54 kg;       ea  

      Height 4 ft. 11 in. (149.86 cm); Pain 8/10;                                                 

23:15  / 78; Pulse 87; Resp 18; Pulse Ox 97% on R/A;                                    ea  

                                                                                             

01:33  / 90; Pulse 98; Resp 18; Pulse Ox 96% on R/A;                                    ea  

02:35  / 68; Pulse 80; Resp 18; Pulse Ox 98% ;                                          ea  

03:15  / 70; Pulse 90; Resp 18; Pulse Ox 98% on R/A;                                    ea  

                                                                                             

22:40 Body Mass Index 58.57 (131.54 kg, 149.86 cm)                                            ea  

                                                                                                  

MDM:                                                                                              

                                                                                             

22:52 Patient medically screened.                                                             wa  

                                                                                             

02:12 Differential diagnosis: pt with  shunt with HA and photophobia. will eval shunt for   wa  

      malfunction. pain control. reassess.                                                        

02:13 Data reviewed: vital signs, nurses notes, lab test result(s). Test interpretation: by   wa  

      ED physician or midlevel provider: labs wnl. Head CT:  shunt tubes are in place           

      without evidence of developing hydrocephalus.                                               

02:41 Response to treatment: HA improved. . Physician consultation: spoke with nrsg Dr. Mckenzie payton  

      at [facility]. accepted for transfer and eval .                                            

                                                                                                  

                                                                                             

23:28 Order name: CBC with Diff; Complete Time: 02:                                         wa  

                                                                                             

23:28 Order name: CMP; Complete Time: 02:                                                   wa  

                                                                                             

23:28 Order name: CT Head Brain wo Cont                                                       wa  

                                                                                             

23:28 Order name: Shuntogram XRAY                                                             wa  

                                                                                             

23:28 Order name: PT-INR; Complete Time: 02:                                                wa  

                                                                                             

23:28 Order name: IV Start; Complete Time: 00:55                                              wa  

                                                                                                  

Administered Medications:                                                                         

00:50 Drug: Phenergan 12.5 mg Route: IVP; Site: left antecubital;                             ea  

01:34 Follow up: Response: No adverse reaction; Marked relief of symptoms; Pain is decreased  ea  

01:35 Follow up: Response: No adverse reaction; Pain is decreased                             ea  

00:50 Drug: Dilaudid 1 mg Route: IVP; Site: left antecubital;                                 ea  

01:35 Follow up: Response: No adverse reaction; Pain is decreased                             ea  

                                                                                                  

                                                                                                  

Disposition:                                                                                      

18 02:45 Transfer ordered to [facility]. Diagnosis is            

  Headache.                                                                                       

- Reason for transfer: Higher level of care.                                                      

- Accepting physician is Dr. Mckenzie Zavala.                                         

- Condition is Stable.                                                                            

- Problem is new.                                                                                 

- Symptoms have improved.                                                                         

                                                                                                  

                                                                                                  

                                                                                                  

Signatures:                                                                                       

Dispatcher [employer]                           EDAnabelle Orlando RN RN ea Appiah, William, MD MD wa                                                   

                                                                                                  

Corrections: (The following items were deleted from the chart)                                    

03:32 02:45 2018 02:45 Transfer ordered to [facility].       ea  

      Diagnosis is Headache. Reason for transfer: Higher level of care. Accepting physician       

      is Dr. Mckenzie Zavala. Condition is Stable. Problem is new. Symptoms have      

      improved. wa                                                                                

                                                                                                  

**************************************************************************************************

## 2018-11-08 NOTE — RAD REPORT
EXAM DESCRIPTION:  RAD - Shuntogram - 11/8/2018 12:21 am

 

CLINICAL HISTORY:  Shunt series, headache, spina bifida

 

COMPARISON:  CT abdomen and pelvis August 22, 2018, CT head November 8, 2018

 

TECHNIQUE:  AP and lateral views of the skull were obtained along with AP and lateral views of the ne
ck. Chest, abdomen and pelvis images obtained as well.

 

FINDINGS:  Patient has lateral right frontal and posterior right parietal shunt tube is in place. Nec
k and chest imaging shows old shunt tubing overlying the right side of the chest. No suspicious bend 
or kink of the tubing. The right parietal shunt appears to be discontinuous with the right frontal sh
unt representing the active shunt tube.

 

Lung fields are clear. Heart size is magnified by shallow inspiration, large body habitus and techniq
ue. No acute finding of the abdomen or pelvis. Underlying deformities of the lower lumbar spine and b
ilateral hip joints noted.

 

IMPRESSION:  No evidence for shunt tube disruption or malfunction.

## 2018-11-08 NOTE — ER
Nurse's Notes                                                                                     

 [facility]                                                                

Name: Redd Dale Jr                                                                            

Age: 32 yrs                                                                                       

Sex: Male                                                                                         

: 1985                                                                                   

MRN: [id number]                                                                                   

Arrival Date: 2018                                                                          

Time: 22:48                                                                                       

Account#: [id number]                                                                            

Bed 17                                                                                            

Private MD:                                                                                       

Diagnosis: Headache                                                                               

                                                                                                  

Presentation:                                                                                     

                                                                                             

22:40 Presenting complaint: EMS states: Pt reports headache x 2 days that is worsening, pt    ea  

      complaining of nausea. Transition of care: patient was received from another setting of     

      care (long-term care [facility]), [facility]. Onset of symptoms was       

      2018. Risk Assessment: Do you want to hurt yourself or someone else?           

      Patient reports no desire to harm self or others. Initial Sepsis Screen: Does the           

      patient meet any 2 criteria? No. Patient's initial sepsis screen is negative. Does the      

      patient have a suspected source of infection? Yes: Skin breakdown/wound. Care prior to      

      arrival: None.                                                                              

22:40 Acuity: AYESHA 3                                                                           ea  

22:48 Method Of Arrival: EMS: [facility]                                                     ea  

                                                                                                  

Triage Assessment:                                                                                

22:58 Headache History: The patient has had previous headaches. General: Appears in no        ea  

      apparent distress. Behavior is calm, cooperative, appropriate for age. Pain: Complains      

      of pain in right side of head Pain currently is 8 out of 10 on a pain scale. Quality of     

      pain is described as aching, pressure, Pain began 2-3 days ago. Also complains of           

      nausea. Neuro: Level of Consciousness is awake, alert, obeys commands, Oriented to          

      person, place, time, situation, Speech is normal, Facial symmetry appears normal.           

      Respiratory: Airway is patent Respiratory effort is even, unlabored, Respiratory            

      pattern is regular, symmetrical, Breath sounds are clear bilaterally. GI: Bowel sounds      

      present X 4 quads. Derm: Skin is fragile, multiple chronic decubitus ulcers Skin is         

      dry, Skin is normal, Skin temperature is warm.                                              

                                                                                                  

Historical:                                                                                       

- Allergies:                                                                                      

22:55 Amoxicillin;                                                                            ea  

22:55 Bactrim;                                                                                ea  

22:55 Ciprofloxacin;                                                                          ea  

22:55 CLAVULANIC ACID;                                                                        ea  

22:55 Doxycycline;                                                                            ea  

22:55 Levofloxacin;                                                                           ea  

22:55 Morphine;                                                                               ea  

22:55 Toradol;                                                                                ea  

22:55 TRIMETHOPRIM;                                                                           ea  

22:55 Vancomycin;                                                                             ea  

22:55 Zofran;                                                                                 ea  

- Home Meds:                                                                                      

22:55 clindamycin HCl 300 mg Oral cap 1 cap every 6 hours [Active]; oxycodone-acetaminophen   ea  

       mg Oral tab 1 tab every 6 hours [Active]; tramadol 50 mg Oral tab 2 tabs every 6     

      hours [Active];                                                                             

- PMHx:                                                                                           

22:55 Asthma; spina bifida; GERD; Hypertension; Hydrocephalus; decubitus ulcers on feet;      ea  

      cluster headaches; Cerebral Palsy;                                                          

                                                                                                  

- Immunization history:: Adult Immunizations up to date.                                          

- Social history:: Smoking status: Patient uses tobacco products, smokes one-half pack            

  cigarettes per day.                                                                             

- Ebola Screening: : No symptoms or risks identified at this time.                                

- Family history:: not pertinent, pertinent for.                                                  

- Hospitalizations: : No recent hospitalization is reported.                                      

                                                                                                  

                                                                                                  

Screenin:57 Abuse screen: Denies threats or abuse. Nutritional screening: No deficits noted.        ea  

      Tuberculosis screening: No symptoms or risk factors identified. Fall Risk None              

      identified.                                                                                 

                                                                                                  

Assessment:                                                                                       

22:58 Reassessment: see triage assessment.                                                    ea  

                                                                                             

00:55 Reassessment: Patient and/or family updated on plan of care and expected duration. Pain ea  

      level reassessed. Patient is alert, oriented x 3, equal unlabored respirations, skin        

      warm/dry/pink.                                                                              

01:27 Reassessment: Patient and/or family updated on plan of care and expected duration. Pain ea  

      level reassessed. Pt resting with eyes closed, respirations even and unlabored, chest       

      expansions even and symmetrical. No s/s of pain or discomfort noted at this time.           

02:21 Reassessment: Patient and/or family updated on plan of care and expected duration. Pain ea  

      level reassessed. Patient is alert, oriented x 3, equal unlabored respirations, skin        

      warm/dry/pink. Provider at bedside updating pt on plan of care.                             

02:41 Reassessment: Report called to Janet COTTO at [facility].                         ea  

03:25 Reassessment: Patient and/or family updated on plan of care and expected duration. Pain ea  

      level reassessed. Patient is alert, oriented x 3, equal unlabored respirations, skin        

      warm/dry/pink. [facility] at facility for transfer.                                          

                                                                                                  

Vital Signs:                                                                                      

                                                                                             

22:40  / 92; Pulse 88; Resp 18; Temp 97.2; Pulse Ox 97% on R/A; Weight 131.54 kg;       ea  

      Height 4 ft. 11 in. (149.86 cm); Pain 8/10;                                                 

23:15  / 78; Pulse 87; Resp 18; Pulse Ox 97% on R/A;                                    ea  

                                                                                             

01:33  / 90; Pulse 98; Resp 18; Pulse Ox 96% on R/A;                                    ea  

02:35  / 68; Pulse 80; Resp 18; Pulse Ox 98% ;                                          ea  

03:15  / 70; Pulse 90; Resp 18; Pulse Ox 98% on R/A;                                    ea  

                                                                                             

22:40 Body Mass Index 58.57 (131.54 kg, 149.86 cm)                                            ea  

                                                                                                  

ED Course:                                                                                        

                                                                                             

22:40 Arm band placed on right wrist. Patient placed in an exam room, on a stretcher, on      ea  

      pulse oximetry.                                                                             

22:40 Patient has correct armband on for positive identification. Bed in low position. Call   ea  

      light in reach. Side rails up X2.                                                           

22:48 Patient arrived in ED.                                                                  ea  

22:51 Triage completed.                                                                       ea  

22:51 Olvin More MD is Attending Physician.                                             wa  

23:58 Anabelle Jasso RN is Primary Nurse.                                                    ea  

                                                                                             

00:22 Shuntogram XRAY In Process Unspecified.                                                 EDMS

00:45 CT completed. Pt tolerated procedure poorly. Patient moved to CT Patient moved to         

      radiology via stretcher. Patient moved back from CT. Patient moved back from radiology.     

00:45 Inserted saline lock: 22 gauge in left antecubital area, using aseptic technique. Blood ea  

      collected.                                                                                  

00:55 CT Head Brain wo Cont In Process Unspecified.                                           EDMS

02:42 No provider procedures requiring assistance completed.                                  ea  

03:00 Patient transferred, IV remains in place.                                               ea  

                                                                                                  

Administered Medications:                                                                         

00:50 Drug: Phenergan 12.5 mg Route: IVP; Site: left antecubital;                             ea  

01:34 Follow up: Response: No adverse reaction; Marked relief of symptoms; Pain is decreased  ea  

01:35 Follow up: Response: No adverse reaction; Pain is decreased                             ea  

00:50 Drug: Dilaudid 1 mg Route: IVP; Site: left antecubital;                                 ea  

01:35 Follow up: Response: No adverse reaction; Pain is decreased                             ea  

                                                                                                  

                                                                                                  

Outcome:                                                                                          

02:45 ER care complete, transfer ordered by MD.                                               wa  

03:00 Instructed on the need for transfer.                                                    ea  

03:30 Transferred by ground EMS to [facility], Transfer form completed.             ea  

03:30 Condition: stable                                                                           

03:32 Patient left the ED.                                                                    ea  

                                                                                                  

Signatures:                                                                                       

Dispatcher [employer]                           Enrique Reeves Elena, RN RN ea Appiah, William, MD MD wa                                                   

                                                                                                  

Corrections: (The following items were deleted from the chart)                                    

03:45 11 22:58 Derm: Skin is dry, Skin is normal, Skin temperature is warm karmen peraza  

                                                                                                  

**************************************************************************************************

## 2018-11-24 ENCOUNTER — HOSPITAL ENCOUNTER (EMERGENCY)
Dept: HOSPITAL 97 - ER | Age: 33
Discharge: HOME | End: 2018-11-24
Payer: COMMERCIAL

## 2018-11-24 DIAGNOSIS — Z88.8: ICD-10-CM

## 2018-11-24 DIAGNOSIS — I10: ICD-10-CM

## 2018-11-24 DIAGNOSIS — Z88.6: ICD-10-CM

## 2018-11-24 DIAGNOSIS — F17.210: ICD-10-CM

## 2018-11-24 DIAGNOSIS — G43.009: Primary | ICD-10-CM

## 2018-11-24 DIAGNOSIS — Z88.3: ICD-10-CM

## 2018-11-24 DIAGNOSIS — Z88.1: ICD-10-CM

## 2018-11-24 DIAGNOSIS — Z88.5: ICD-10-CM

## 2018-11-24 LAB
ALBUMIN SERPL BCP-MCNC: 3.3 G/DL (ref 3.4–5)
ALP SERPL-CCNC: 118 U/L (ref 45–117)
ALT SERPL W P-5'-P-CCNC: 33 U/L (ref 12–78)
AST SERPL W P-5'-P-CCNC: 12 U/L (ref 15–37)
BUN BLD-MCNC: 17 MG/DL (ref 7–18)
GLUCOSE SERPLBLD-MCNC: 114 MG/DL (ref 74–106)
HCT VFR BLD CALC: 46 % (ref 39.6–49)
LYMPHOCYTES # SPEC AUTO: 1.8 K/UL (ref 0.7–4.9)
MCH RBC QN AUTO: 27.3 PG (ref 27–35)
MCV RBC: 82.1 FL (ref 80–100)
PMV BLD: 8.3 FL (ref 7.6–11.3)
POTASSIUM SERPL-SCNC: 4.4 MMOL/L (ref 3.5–5.1)
RBC # BLD: 5.61 M/UL (ref 4.33–5.43)

## 2018-11-24 PROCEDURE — 70450 CT HEAD/BRAIN W/O DYE: CPT

## 2018-11-24 PROCEDURE — 80053 COMPREHEN METABOLIC PANEL: CPT

## 2018-11-24 PROCEDURE — 72125 CT NECK SPINE W/O DYE: CPT

## 2018-11-24 PROCEDURE — 96374 THER/PROPH/DIAG INJ IV PUSH: CPT

## 2018-11-24 PROCEDURE — 36415 COLL VENOUS BLD VENIPUNCTURE: CPT

## 2018-11-24 PROCEDURE — 99284 EMERGENCY DEPT VISIT MOD MDM: CPT

## 2018-11-24 PROCEDURE — 96375 TX/PRO/DX INJ NEW DRUG ADDON: CPT

## 2018-11-24 PROCEDURE — 96361 HYDRATE IV INFUSION ADD-ON: CPT

## 2018-11-24 PROCEDURE — 85025 COMPLETE CBC W/AUTO DIFF WBC: CPT

## 2018-11-24 NOTE — XMS REPORT
[facility]

 Created on:2018



Patient:Redd Dale

Sex:Male

:1985

External Reference #:[id number]





Demographics







 Address  [address] [place] [address]

 

 Phone  [phone]

 

 Preferred Language  en

 

 Marital Status  Unknown

 

 Jainism Affiliation  Unknown

 

 Race  Black or 

 

 Ethnic Group  Not  or 









Author







 Organization  [facility]









Care Team Providers







 Name  Role  Phone

 

 Knox, Na  Provider Role  Unavailable









Allergies

No Known Allergies



Problems







 Problem Type  Condition  Code  Onset Dates  Condition Status

 

 Problem  Nicotine dependence  F17.200    Active

 

 Problem  Lumbar spina bifida with  Q05.2    Active



   hydrocephalus      

 

 Problem  Dependence on wheelchair  Z99.3    Active

 

 Problem  Fecal incontinence  R15.9    Active

 

 Problem  Foot ulcer  L97.509    Active

 

 Problem  Depression with anxiety  F41.8    Active

 

 Problem  Paraplegia  G82.20    Active

 

 Problem  Constipation  K59.00    Active

 

 Problem  Incontinence of urine  R32    Active

 

 Problem  Nausea alone  R11.0    Active

 

 Problem  Episodic tension-type headache, not  G44.219    Active



   intractable      

 

 Problem  Nonintractable episodic headache,  R51    Active



   unspecified headache type      

 

 Problem   (ventriculoperitoneal) shunt  Z98.2    Active



   status      







Medications

No Known Medications



Results

No Known Results



Summary Purpose

[employer] Submission

## 2018-11-24 NOTE — XMS REPORT
[facility]

 Created on:2018



Patient:Redd Dale

Sex:Male

:1985

External Reference #:[id number]





Demographics







 Address  [address]  [place] [address]

 

 Phone  [phone]

 

 Preferred Language  en

 

 Marital Status  Unknown

 

 Confucianism Affiliation  Unknown

 

 Race  Black or 

 

 Ethnic Group  Unknown









Author







 Organization  [employer]









Care Team Providers







 Name  Role  Phone

 

 Knox, Na  Provider Role  Unavailable









Allergies, Adverse Reactions, Alerts







 Substance  Reaction  Event Type

 

 Zofran  Info Not Available  Drug Allergy

 

 Morphine Sulfate ER  Info Not Available  Drug Allergy

 

 Levaquin  Info Not Available  Drug Allergy







Problems







 Problem Type  Condition  Code  Onset Dates  Condition Status

 

 Assessment  Mental disorder, not otherwise  F99    Active



   specified      

 

 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



   disorder      

 

 Assessment  Ulcer of left foot, unspecified  L97.529    Active



   ulcer stage      

 

 Problem  Foot ulcer  L97.509    Active

 

 Assessment  Nonintractable episodic headache,  R51    Active



   unspecified headache type      

 

 Problem  Constipation  K59.00    Active

 

 Assessment  Episodic tension-type headache, not  G44.219    Active



   intractable      

 

 Problem  Paraplegia  G82.20    Active

 

 Problem  Incontinence of urine  R32    Active

 

 Problem  Nausea alone  R11.0    Active

 

 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



   disorder      

 

 Problem  Mental disorder, not otherwise  F99    Active



   specified      

 

 Assessment  Bipolar depression  F31.30    Active

 

 Assessment  Lumbar spina bifida with  Q05.2    Active



   hydrocephalus      

 

 Problem  Bipolar depression  F31.30    Active

 

 Assessment   (ventriculoperitoneal) shunt  Z98.2    Active



   status      

 

 Problem  Depression with anxiety  F41.8    Active

 

 Problem  Fecal incontinence  R15.9    Active

 

 Problem  Nausea and vomiting, intractability  R11.2    Active



   of vomiting not specified,      



   unspecified vomiting type      

 

 Problem  Ulcer of left foot, unspecified  L97.529    Active



   ulcer stage      

 

 Problem  Episodic tension-type headache, not  G44.219    Active



   intractable      

 

 Problem  Nonintractable episodic headache,  R51    Active



   unspecified headache type      

 

 Assessment  Depression with anxiety  F41.8    Active

 

 Problem  Dependence on wheelchair  Z99.3    Active

 

 Problem  Lumbar spina bifida with  Q05.2    Active



   hydrocephalus      

 

 Problem   (ventriculoperitoneal) shunt  Z98.2    Active



   status      

 

 Problem  Nicotine dependence  F17.200    Active







Medications







 Medication  Code  Code  Instructions  Start  End  Status  Dosage



   System      Date  Date    

 

 Collagenase  NDC  [address]-83  250 UNIT/GM      Active  1 application



       Externally        to affected



       Once a day        area

 

 Macrobid  NDC  47233208517  100 MG Orally      Active  1 capsule



       every 12 hrs        with food

 

 Tylenol with  NDC  30315883703  300-60 MG      Active  1 tablet as



 Codeine #4      Orally every 6        needed



       hrs        

 

 Citalopram  NDC  62745693547  10 MG Orally  July    Active  1 tablet



 Hydrobromide      Once a day  2018      

 

 Pantoprazole  NDC  41233072851  40 MG Orally      Active  1 tablet



 Sodium      Once a day        

 

 Seroquel  NDC  40848966839  25 MG Orally  July    Active  1 tablet



       Once a day at  16,      



       bedtime        







Results

No Known Results



Summary Purpose

[employer] Submission

## 2018-11-24 NOTE — XMS REPORT
Patient Summary Document

 Created on:2018



Patient:JORDAN VERDIN

Sex:Male

:1985

External Reference #:[id number]





Demographics







 Address  [address]



   [place] [address]

 

 Home Phone  [phone]

 

 Work Phone  [phone]

 

 Email Address  [phone]

 

 Preferred Language  Unknown

 

 Marital Status  Unknown

 

 Rastafarian Affiliation  Unknown

 

 Race  Unknown

 

 Additional Race(s)  Unavailable

 

 Ethnic Group  Unknown









Author







 Organization  [facility]

 

 Address  [address] [place] [address] [address]



   [place] [address]

 

 Phone  [phone]









Care Team Providers







 Name  Role  Phone

 

 RAMU TORRES  Unavailable  Unavailable









Problems

This patient has no known problems.



Allergies, Adverse Reactions, Alerts

This patient has no known allergies or adverse reactions.



Medications

This patient has no known medications.



Results







 Test Description  Test Time  Test Comments  Text Results  Atomic Results  
Result Comments









 BLOOD CULTURE  2017 16:22:00    









   Test Item  Value  Reference Range  Comments









 CULTURE (BEAKER) (test code=1095)  No growth in 5 days    



BLOOD [id number] 16:22:00





 Test Item  Value  Reference Range  Comments

 

 CULTURE (BEAKER) (test code=1095)  No growth in 5 days    



(MANUAL DIFFERENTIAL)2017 22:29:00





 Test Item  Value  Reference Range  Comments

 

 TOTAL COUNTED (BEAKER) (test code=1351)      

 

 WBC MORPHOLOGY (BEAKER) (test code=487)  Normal    

 

 PLT MORPHOLOGY (BEAKER) (test code=486)  Normal    

 

 RBC MORPHOLOGY (BEAKER) (test code=762)  Normal    



CBC W/PLT COUNT &amp; AUTO [id number] 22:28:00





 Test Item  Value  Reference Range  Comments

 

 WHITE BLOOD CELL COUNT (BEAKER) (test code=775)  7.3 K/ L  4.0-10.0  

 

 RED BLOOD CELL COUNT (BEAKER) (test code=761)  5.09 M/ L  4.20-5.80  

 

 HEMOGLOBIN (BEAKER) (test code=410)  13.0 GM/DL  13.0-16.8  

 

 HEMATOCRIT (BEAKER) (test code=411)  42.3 %  40.0-50.0  

 

 MEAN CORPUSCULAR VOLUME (BEAKER) (test code=753)  83.0 fL  82.0-98.0  

 

 MEAN CORPUSCULAR HEMOGLOBIN (BEAKER) (test  25.6 pg  27.0-33.0  



 code=751)      

 

 MEAN CORPUSCULAR HEMOGLOBIN CONC (BEAKER) (test  30.8 GM/DL  32.0-36.0  



 code=752)      

 

 RED CELL DISTRIBUTION WIDTH (BEAKER) (test  18.0 %  10.3-14.2  



 code=412)      

 

 PLATELET COUNT (BEAKER) (test code=756)  331 K/CU MM  150-430  

 

 MEAN PLATELET VOLUME (BEAKER) (test code=754)  8.5 fL  6.5-10.5  

 

 NUCLEATED RED BLOOD CELLS (BEAKER) (test  0 /100 WBC  0-0  



 code=413)      

 

 NEUTROPHILS RELATIVE PERCENT (BEAKER) (test  65 %    



 code=429)      

 

 LYMPHOCYTES RELATIVE PERCENT (BEAKER) (test  23 %    



 code=430)      

 

 MONOCYTES RELATIVE PERCENT (BEAKER) (test  8 %    



 code=431)      

 

 EOSINOPHILS RELATIVE PERCENT (BEAKER) (test  3 %    



 code=432)      

 

 BASOPHILS RELATIVE PERCENT (BEAKER) (test  1 %    



 code=437)      

 

 NEUTROPHILS ABSOLUTE COUNT (BEAKER) (test  4.75 K/ L  1.80-8.00  



 code=670)      

 

 LYMPHOCYTES ABSOLUTE COUNT (BEAKER) (test  1.68 K/ L  1.48-4.50  



 code=414)      

 

 MONOCYTES ABSOLUTE COUNT (BEAKER) (test  0.55 K/ L  0.00-1.30  



 code=415)      

 

 EOSINOPHILS ABSOLUTE COUNT (BEAKER) (test  0.24 K/ L  0.00-0.50  



 code=416)      

 

 BASOPHILS ABSOLUTE COUNT (BEAKER) (test  0.05 K/ L  0.00-0.20  



 code=417)      



[id number] METABOLIC [id number] 11:11:00





 Test Item  Value  Reference Range  Comments

 

 SODIUM (BEAKER) (test  138 meq/L  136-145  



 hpsv=632)      

 

 POTASSIUM (BEAKER) (test  4.0 meq/L  3.5-5.1  



 code=379)      

 

 CHLORIDE (BEAKER) (test  108 meq/L    



 code=382)      

 

 CO2 (BEAKER) (test  23 meq/L  22-29  



 code=355)      

 

 BLOOD UREA NITROGEN  11 mg/dL  7-21  



 (BEAKER) (test code=354)      

 

 CREATININE (BEAKER) (test  0.74 mg/dL  0.57-1.25  



 code=358)      

 

 GLUCOSE RANDOM (BEAKER)  124 mg/dL    



 (test code=652)      

 

 CALCIUM (BEAKER) (test  8.9 mg/dL  8.4-10.2  



 code=697)      

 

 EGFR (BEAKER) (test  150 mL/min/1.73 sq m    ESTIMATED GFR IS NOT AS



 code=1092)      ACCURATE AS CREATININE



       CLEARANCE IN PREDICTING



       GLOMERULAR FILTRATION



       RATE. ESTIMATED GFR IS



       NOT APPLICABLE FOR



       DIALYSIS PATIENTS.



URINE [id number] 09:56:00





 Test Item  Value  Reference Range  Comments

 

 CULTURE (BEAKER) (test code=1095)  <10,000 col/mL skin jaime    



COMPREHENSIVE METABOLIC [id number] 08:49:00





 Test Item  Value  Reference Range  Comments

 

 TOTAL PROTEIN (BEAKER)  7.3 gm/dL  6.0-8.3  



 (test code=770)      

 

 ALBUMIN (BEAKER) (test  3.3 g/dL  3.5-5.0  



 code=1145)      

 

 ALKALINE PHOSPHATASE  89 U/L    



 (BEAKER) (test code=346)      

 

 BILIRUBIN TOTAL (BEAKER)  1.4 mg/dL  0.2-1.2  



 (test code=377)      

 

 SODIUM (BEAKER) (test  137 meq/L  136-145  



 guvp=045)      

 

 POTASSIUM (BEAKER) (test  3.7 meq/L  3.5-5.1  



 code=379)      

 

 CHLORIDE (BEAKER) (test  108 meq/L    



 code=382)      

 

 CO2 (BEAKER) (test  17 meq/L  22-29  



 code=355)      

 

 BLOOD UREA NITROGEN  12 mg/dL  7-21  



 (BEAKER) (test code=354)      

 

 CREATININE (BEAKER) (test  0.78 mg/dL  0.57-1.25  



 code=358)      

 

 GLUCOSE RANDOM (BEAKER)  119 mg/dL    



 (test code=652)      

 

 CALCIUM (BEAKER) (test  8.6 mg/dL  8.4-10.2  



 code=697)      

 

 AST (SGOT) (BEAKER) (test  13 U/L  5-34  



 code=353)      

 

 ALT (SGPT) (BEAKER) (test  28 U/L  6-55  



 code=347)      

 

 EGFR (BEAKER) (test  141 mL/min/1.73 sq    ESTIMATED GFR IS NOT AS



 code=1092)  m    ACCURATE AS CREATININE



       CLEARANCE IN PREDICTING



       GLOMERULAR FILTRATION



       RATE. ESTIMATED GFR IS



       NOT APPLICABLE FOR



       DIALYSIS PATIENTS.



CBC W/PLT COUNT &amp; AUTO [id number] 08:46:00





 Test Item  Value  Reference Range  Comments

 

 WHITE BLOOD CELL COUNT (BEAKER) (test code=775)  9.4 K/ L  4.0-10.0  

 

 RED BLOOD CELL COUNT (BEAKER) (test code=761)  4.79 M/ L  4.20-5.80  

 

 HEMOGLOBIN (BEAKER) (test code=410)  12.8 GM/DL  13.0-16.8  

 

 HEMATOCRIT (BEAKER) (test code=411)  40.0 %  40.0-50.0  

 

 MEAN CORPUSCULAR VOLUME (BEAKER) (test code=753)  83.4 fL  82.0-98.0  

 

 MEAN CORPUSCULAR HEMOGLOBIN (BEAKER) (test  26.7 pg  27.0-33.0  



 code=751)      

 

 MEAN CORPUSCULAR HEMOGLOBIN CONC (BEAKER) (test  32.0 GM/DL  32.0-36.0  



 code=752)      

 

 RED CELL DISTRIBUTION WIDTH (BEAKER) (test  18.2 %  10.3-14.2  



 code=412)      

 

 PLATELET COUNT (BEAKER) (test code=756)  313 K/CU MM  150-430  

 

 MEAN PLATELET VOLUME (BEAKER) (test code=754)  8.6 fL  6.5-10.5  

 

 NUCLEATED RED BLOOD CELLS (BEAKER) (test  0 /100 WBC  0-0  



 code=413)      

 

 NEUTROPHILS RELATIVE PERCENT (BEAKER) (test  70 %    



 code=429)      

 

 LYMPHOCYTES RELATIVE PERCENT (BEAKER) (test  16 %    



 code=430)      

 

 MONOCYTES RELATIVE PERCENT (BEAKER) (test  12 %    



 code=431)      

 

 EOSINOPHILS RELATIVE PERCENT (BEAKER) (test  1 %    



 code=432)      

 

 BASOPHILS RELATIVE PERCENT (BEAKER) (test  1 %    



 code=437)      

 

 NEUTROPHILS ABSOLUTE COUNT (BEAKER) (test  6.54 K/ L  1.80-8.00  



 code=670)      

 

 LYMPHOCYTES ABSOLUTE COUNT (BEAKER) (test  1.50 K/ L  1.48-4.50  



 code=414)      

 

 MONOCYTES ABSOLUTE COUNT (BEAKER) (test  1.13 K/ L  0.00-1.30  



 code=415)      

 

 EOSINOPHILS ABSOLUTE COUNT (BEAKER) (test  0.13 K/ L  0.00-0.50  



 code=416)      

 

 BASOPHILS ABSOLUTE COUNT (BEAKER) (test  0.06 K/ L  0.00-0.20  



 code=417)      



0.00URINALYSIS W/ [id number] 20:36:00





 Test Item  Value  Reference Range  Comments

 

 COLOR (BEAKER) (test code=470)  Yellow    

 

 CLARITY (BEAKER) (test code=469)  Hazy    

 

 SPECIFIC GRAVITY UA (BEAKER) (test code=468)  1.012  1.001-1.035  

 

 PH UA (BEAKER) (test code=467)  5.5  5.0-8.0  

 

 PROTEIN UA (BEAKER) (test code=464)  100 mg/dL  Negative  

 

 GLUCOSE UA (BEAKER) (test code=365)  Negative  Negative  

 

 KETONES UA (BEAKER) (test code=371)  Negative  Negative  

 

 BILIRUBIN UA (BEAKER) (test code=462)  Negative  Negative  

 

 BLOOD UA (BEAKER) (test code=461)  Moderate  Negative  

 

 NITRITE UA (BEAKER) (test code=465)  Negative  Negative  

 

 LEUKOCYTE ESTERASE UA (BEAKER) (test code=466)  Large  Negative  

 

 UROBILINOGEN UA (BEAKER) (test code=463)  3.0 mg/dL  0.2-1.0  

 

 RBC UA (BEAKER) (test code=519)  19 /HPF    

 

 WBC UA (BEAKER) (test code=520)  182 /HPF    

 

 SOURCE(BEAKER) (test code=7457)      



BASIC METABOLIC [id number] 17:00:00





 Test Item  Value  Reference Range  Comments

 

 SODIUM (BEAKER) (test  140 meq/L  136-145  



 uwce=287)      

 

 POTASSIUM (BEAKER) (test  3.7 meq/L  3.5-5.1  



 code=379)      

 

 CHLORIDE (BEAKER) (test  112 meq/L    



 code=382)      

 

 CO2 (BEAKER) (test  17 meq/L  22-29  



 code=355)      

 

 BLOOD UREA NITROGEN  23 mg/dL  7-21  



 (BEAKER) (test code=354)      

 

 CREATININE (BEAKER) (test  1.00 mg/dL  0.57-1.25  



 code=358)      

 

 GLUCOSE RANDOM (BEAKER)  102 mg/dL    



 (test code=652)      

 

 CALCIUM (BEAKER) (test  8.7 mg/dL  8.4-10.2  



 code=697)      

 

 EGFR (BEAKER) (test  106 mL/min/1.73 sq m    ESTIMATED GFR IS NOT AS



 code=1092)      ACCURATE AS CREATININE



       CLEARANCE IN PREDICTING



       GLOMERULAR FILTRATION



       RATE. ESTIMATED GFR IS



       NOT APPLICABLE FOR



       DIALYSIS PATIENTS.



Specimen slightly ictericCBC W/PLT COUNT &amp; AUTO [id number] 12:18
:00





 Test Item  Value  Reference Range  Comments

 

 WHITE BLOOD CELL COUNT (BEAKER) (test code=775)  16.4 K/ L  4.0-10.0  

 

 RED BLOOD CELL COUNT (BEAKER) (test code=761)  5.22 M/ L  4.20-5.80  

 

 HEMOGLOBIN (BEAKER) (test code=410)  14.4 GM/DL  13.0-16.8  

 

 HEMATOCRIT (BEAKER) (test code=411)  42.9 %  40.0-50.0  

 

 MEAN CORPUSCULAR VOLUME (BEAKER) (test code=753)  82.2 fL  82.0-98.0  

 

 MEAN CORPUSCULAR HEMOGLOBIN (BEAKER) (test  27.5 pg  27.0-33.0  



 code=751)      

 

 MEAN CORPUSCULAR HEMOGLOBIN CONC (BEAKER) (test  33.5 GM/DL  32.0-36.0  



 code=752)      

 

 RED CELL DISTRIBUTION WIDTH (BEAKER) (test  16.2 %  10.3-14.2  



 code=412)      

 

 PLATELET COUNT (BEAKER) (test code=756)  329 K/CU MM  150-430  

 

 MEAN PLATELET VOLUME (BEAKER) (test code=754)  8.2 fL  6.5-10.5  

 

 NUCLEATED RED BLOOD CELLS (BEAKER) (test  0 /100 WBC  0-0  



 code=413)      

 

 NEUTROPHILS RELATIVE PERCENT (BEAKER) (test  83 %    



 code=429)      

 

 LYMPHOCYTES RELATIVE PERCENT (BEAKER) (test  7 %    



 code=430)      

 

 MONOCYTES RELATIVE PERCENT (BEAKER) (test  9 %    



 code=431)      

 

 EOSINOPHILS RELATIVE PERCENT (BEAKER) (test  0 %    



 code=432)      

 

 BASOPHILS RELATIVE PERCENT (BEAKER) (test  0 %    



 code=437)      

 

 NEUTROPHILS ABSOLUTE COUNT (BEAKER) (test  1.62 K/ L  1.80-8.00  



 code=670)      

 

 LYMPHOCYTES ABSOLUTE COUNT (BEAKER) (test  1.15 K/ L  1.48-4.50  



 code=414)      

 

 MONOCYTES ABSOLUTE COUNT (BEAKER) (test  1.56 K/ L  0.00-1.30  



 code=415)      

 

 EOSINOPHILS ABSOLUTE COUNT (BEAKER) (test  0.01 K/ L  0.00-0.50  



 code=416)      

 

 BASOPHILS ABSOLUTE COUNT (BEAKER) (test  0.02 K/ L  0.00-0.20  



 code=417)      



(MANUAL DIFFERENTIAL)2017 12:18:00





 Test Item  Value  Reference Range  Comments

 

 TOTAL COUNTED (BEAKER) (test code=1351)      

 

 WBC MORPHOLOGY (BEAKER) (test code=487)  Normal    

 

 PLT MORPHOLOGY (BEAKER) (test code=486)  Normal    

 

 RBC MORPHOLOGY (BEAKER) (test code=762)  Normal

## 2018-11-24 NOTE — EDPHYS
Physician Documentation                                                                           

 [facility]                                                                

Name: Redd Dale Jr                                                                            

Age: 33 yrs                                                                                       

Sex: Male                                                                                         

: 1985                                                                                   

MRN: [id number]                                                                                   

Arrival Date: 2018                                                                          

Time: 19:37                                                                                       

Account#: [id number]                                                                            

Bed 28                                                                                            

Private MD:                                                                                       

ED Physician Keya Pinon                                                                    

HPI:                                                                                              

                                                                                             

20:57 This 33 yrs old Black Male presents to ER via Wheelchair with complaints of Headache.   ma2 

20:57 The patient complains of pain to the forehead. Onset: The symptoms/episode              ma2 

      began/occurred suddenly, 1 hour(s) ago. Onset: The symptoms/episode began/occurred          

      suddenly, gradually, 2 hour(s) ago. Associated signs and symptoms: Pertinent negatives:     

      altered mental status, dizziness, malaise, neck stiffness, Photophobia rash, vision         

      loss, vomiting, vertigo. Severity of symptoms: At its worst the pain was moderate, in       

      the emergency department the pain is unchanged. Severity of symptoms: At its worst the      

      pain was in the emergency department the pain. Headache History: The patient has had        

      previous headaches and this one is similar to previous episodes. The patient has            

      experienced similar episodes in the past.                                                   

                                                                                                  

Historical:                                                                                       

- Allergies:                                                                                      

20:00 Amoxicillin;                                                                            rv  

20:00 Bactrim;                                                                                rv  

20:00 Ciprofloxacin;                                                                          rv  

20:00 CLAVULANIC ACID;                                                                        rv  

20:00 Doxycycline;                                                                            rv  

20:00 Levofloxacin;                                                                           rv  

20:00 Morphine;                                                                               rv  

20:00 Toradol;                                                                                rv  

20:00 TRIMETHOPRIM;                                                                           rv  

20:00 Vancomycin;                                                                             rv  

20:00 Zofran;                                                                                 rv  

- Home Meds:                                                                                      

20:00 Pepcid Oral [Active];                                                                   rv  

- PMHx:                                                                                           

20:00 Asthma; Cerebral Palsy; cluster headaches; decubitus ulcers on feet; GERD;              rv  

      Hydrocephalus; Hypertension; spina bifida;                                                  

- PSHx:                                                                                           

20:00 SHUNT REVISION ; AMPUTATION OF LOWER LIMB ;                                     rv  

                                                                                                  

- Immunization history:: Adult Immunizations up to date, Flu vaccine is up to date.               

- Social history:: Smoking status: Patient uses tobacco products, smokes one-half pack            

  cigarettes per day, Patient/guardian denies using alcohol, street drugs, The patient            

  lives with family.                                                                              

- Ebola Screening: : Patient negative for fever greater than or equal to 101.5 degrees            

  Fahrenheit, and additional compatible Ebola Virus Disease symptoms Patient denies               

  exposure to infectious person Patient denies travel to an Ebola-affected area in the            

  21 days before illness onset.                                                                   

- Family history:: not pertinent, pertinent for.                                                  

                                                                                                  

                                                                                                  

ROS:                                                                                              

20:57 Neuro: Positive for headache, Negative for altered mental status, dizziness, gait       ma2 

      disturbance, seizure activity, syncope, tinnitus, tremor.                                   

20:57 All other systems are negative.                                                             

21:56 Constitutional: Negative for fever, chills, and weight loss, ENT: Negative for injury,  ma2 

      pain, and discharge.                                                                        

                                                                                                  

Exam:                                                                                             

20:57 Constitutional:  This is a well developed, well nourished patient who is awake, alert,  ma2 

      and in no acute distress. Chest/axilla:  Normal chest wall appearance and motion.           

      Nontender with no deformity.  No lesions are appreciated. Cardiovascular:  Regular rate     

      and rhythm with a normal S1 and S2.  No gallops, murmurs, or rubs.  Normal PMI, no JVD.     

       No pulse deficits. Respiratory:  Lungs have equal breath sounds bilaterally, clear to      

      auscultation and percussion.  No rales, rhonchi or wheezes noted.  No increased work of     

      breathing, no retractions or nasal flaring. Abdomen/GI:  Soft, non-tender, with normal      

      bowel sounds.  No distension or tympany.  No guarding or rebound.  No evidence of           

      tenderness throughout. Neuro:  Awake and alert, GCS 15, oriented to person, place,          

      time, and situation.  Cranial nerves II-XII grossly intact.  Motor strength 5/5 in all      

      extremities.  Sensory grossly intact.  Cerebellar exam normal.  Normal gait.                

                                                                                                  

Vital Signs:                                                                                      

20:00  / 94; Pulse 112; Resp 16; Temp 98.6; Pulse Ox 97% on R/A; Weight 131.54 kg; Pain rv  

      9/10;                                                                                       

21:00 BP 96 / 50; Pulse 92; Resp 17; Pulse Ox 96% on R/A;                                     rv  

23:03  / 55; Pulse 95; Resp 17; Pulse Ox 97% on R/A;                                    rv  

                                                                                                  

Mobile Coma Score:                                                                               

20:57 Eye Response: spontaneous(4). Verbal Response: oriented(5). Motor Response: obeys       ma2 

      commands(6). Total: 15.                                                                     

                                                                                                  

MDM:                                                                                              

19:41 Patient medically screened.                                                             ma2 

20:57 Differential diagnosis: cluster headache, migraine, sinusitis, uremia.                  ma2 

21:54 Data reviewed: vital signs, nurses notes. Counseling: I had a detailed discussion with  guido 

      the patient and/or guardian regarding: the historical points, exam findings, and any        

      diagnostic results supporting the discharge/admit diagnosis, the presence of at least       

      one elevated blood pressure reading (>120/80) during this emergency department visit,       

      the need for outpatient follow up. Response to treatment: the patient's symptoms have       

      resolved after treatment.                                                                   

                                                                                                  

                                                                                             

20:18 Order name: CBC with Diff; Complete Time: 21:15                                         [facility] 

                                                                                             

20:18 Order name: CMP; Complete Time: 21:15                                                   [facility] 

                                                                                             

20:18 Order name: CT Head C Spine; Complete Time: 21:54                                       ma 

                                                                                                  

Administered Medications:                                                                         

20:36 Drug: Benadryl 50 mg Route: IVP; Site: right antecubital;                               rv  

23:05 Follow up: Response: Pain is decreased                                                  rv  

21:00 Drug: NS 0.9% 1000 ml Route: IV; Rate: 1 bolus; Site: right antecubital;                rv  

23:05 Follow up: IV Status: Completed infusion                                                rv  

21:21 Drug: Reglan 10 mg Route: IVP; Site: right antecubital;                                 rv  

23:05 Follow up: Response: Pain is decreased                                                  rv  

21:31 CANCELLED (Patient Refused): Zofran 4 mg IVP once; over 2 minutes                       [facility] 

21:45 Drug: Dilaudid 1 mg Route: IVP; Site: right antecubital;                                rv  

23:04 Follow up: Response: Pain is decreased                                                  rv  

                                                                                                  

                                                                                                  

Disposition:                                                                                      

18 21:55 Discharged to Home. Impression: Migraine without aura.                             

- Condition is Stable.                                                                            

- Discharge Instructions: Migraine Headache.                                                      

- Prescriptions for Reglan 10 mg Oral Tablet - take 1 tablet by ORAL route every 6                

  hours take 30 minutes before meals and at bedtime; 20 tablet.                                   

- Medication Reconciliation Form, Thank You Letter, Antibiotic Education, Prescription            

  Opioid Use, SBAR form form.                                                                     

- Follow up: Private Physician; When: Tomorrow; Reason: Continuance of care.                      

                                                                                                  

                                                                                                  

                                                                                                  

Signatures:                                                                                       

Dispatcher [employer]                           EDMS                                                 

Keya Pinon MD MD   ma2                                                  

Jj Haile RN                    RN   rv                                                   

                                                                                                  

Corrections: (The following items were deleted from the chart)                                    

21:31 21:31 Zofran 4 mg IVP once; over 2 minutes ordered. [place] [id number] 

23:58 21:55 2018 21:55 Discharged to Home. Impression: Migraine without aura. Condition rv  

      is Stable. Forms are Medication Reconciliation Form, Thank You Letter, Antibiotic           

      Education, Prescription Opioid Use. Follow up: Private Physician; When: Tomorrow;           

      Reason: Continuance of care. ma2                                                            

                                                                                                  

**************************************************************************************************

## 2018-11-24 NOTE — XMS REPORT
[facility]

 Created on:2018



Patient:Redd Dale

Sex:Male

:1985

External Reference #:[id number]





Demographics







 Address  [address]  [place] [address]

 

 Phone  [phone]

 

 Preferred Language  en

 

 Marital Status  Unknown

 

 [employer] Affiliation  Unknown

 

 Race  Black or 

 

 Ethnic Group  Not  or 









Author







 Organization  [employer]









Care Team Providers







 Name  Role  Phone

 

 Knox, Na  Provider Role  Unavailable









Allergies, Adverse Reactions, Alerts







 Substance  Reaction  Event Type

 

 Toradol  Info Not Available  Drug Allergy

 

 Sulfa  Info Not Available  Drug Allergy

 

 Zofran  Info Not Available  Drug Allergy

 

 Morphine Sulfate ER  Info Not Available  Drug Allergy

 

 Levaquin  Info Not Available  Drug Allergy







Problems







 Problem Type  Condition  Code  Onset Dates  Condition Status

 

 Problem  Nicotine dependence  F17.200    Active

 

 Problem  Lumbar spina bifida with  Q05.2    Active



   hydrocephalus      

 

 Problem  Dependence on wheelchair  Z99.3    Active

 

 Problem  Fecal incontinence  R15.9    Active

 

 Problem  Foot ulcer  L97.509    Active

 

 Problem  Depression with anxiety  F41.8    Active

 

 Problem  Paraplegia  G82.20    Active

 

 Problem  Constipation  K59.00    Active

 

 Problem  Incontinence of urine  R32    Active

 

 Problem  Nausea alone  R11.0    Active

 

 Assessment  Episodic tension-type headache, not  G44.219    Active



   intractable      

 

 Assessment   (ventriculoperitoneal) shunt  Z98.2    Active



   status      

 

 Assessment  Ulcer of left foot, unspecified  L97.529    Active



   ulcer stage      

 

 Assessment  Nonintractable episodic headache,  R51    Active



   unspecified headache type      

 

 Assessment  Depression with anxiety  F41.8    Active

 

 Problem  Episodic tension-type headache, not  G44.219    Active



   intractable      

 

 Assessment  Lumbar spina bifida with  Q05.2    Active



   hydrocephalus      

 

 Problem  Nonintractable episodic headache,  R51    Active



   unspecified headache type      

 

 Assessment  Nausea and vomiting, intractability  R11.2    Active



   of vomiting not specified,      



   unspecified vomiting type      

 

 Problem   (ventriculoperitoneal) shunt  Z98.2    Active



   status      







Medications







 Medication  Code  Code  Instructions  Start  End  Status  Dosage



   System      Date  Date    

 

 Tylenol with  NDC  39032799141  300-60 MG      Active  1 tablet as



 Codeine #4      Orally every 6        needed



       hrs        

 

 Macrobid  NDC  67795645753  100 MG Orally      Active  1 capsule



       every 12 hrs        with food

 

 Pantoprazole  NDC  78294352121  40 MG Orally      Active  1 tablet



 Sodium      Once a day        

 

 Collagenase  NDC  [address]-18  250 UNIT/GM      Active  1 application



       Externally        to affected



       Once a day        area







Results

No Known Results



Summary Purpose

[employer] Submission

## 2018-11-24 NOTE — XMS REPORT
Continuity of Care Document

 Created on:2018



Patient:JORDAN VERDIN JR

Sex:Male

:1985

External Reference #:[id number]





Demographics







 Address  [address] [place] [id number]

 

 Phone  [phone]

 

 Phone  [phone]

 

 Preferred Language  Unknown

 

 Marital Status  Unknown

 

 Zoroastrianism Affiliation  Unknown

 

 Race  Unknown

 

 Ethnic Group  Unknown









Author







 Organization  [facility]









Problems







 Problem  Status  Onset  Classification  Date  Comments  Source



     Date    Reported    



             



             



             

 

 HEADACHE  Active  20        [address]



             



             

 

 SHUNT MALFUNCTION  Active  20        [address]



             



             

 

 ACUTE HEADACHE  Active  20        [address]



             



             

 

 Acute pain  Active    Problem  2018    [facility]



             



             

 

 Asthma  Resolved    Problem  2018    [facility]



             



             

 

 Bronchitis  Resolved    Problem  2018    [facility]



             



             

 

 Cerebral palsy  Resolved    Problem  2018    [facility]



             



             

 

 Headache  Active    Problem  2018    [facility]



             



             

 

 Hydrocephalus  Resolved    Problem  2018    [facility]



             



             

 

 Osteomyelitis  Resolved    Problem  2018    [facility]



             



             

 

 HEADACHE  Active          [facility]



             



             







Medications







 Medication  Details  Route  Status  Patient  Ordering  Order  Source



         Instructions  Provider  Date  



               



               



               

 

 Docusate Sodium  100 mg=1 cap,    Active         [place]



 100 MG Oral  PO, BID, 0          2018  Medical



 Capsule  Refill(s)            [place]



               



               

 

 Zosyn  0 Refill(s)    Active        [facility] [place]



             2018  Medical



               [place]



               



               

 

 celecoxib 200  200 mg=1 cap,    Active      [facility] [place]



 mg oral capsule  PO, BID, 0          2018  Medical



   Refill(s)            Center



               



               

 

 ascorbic acid  500 mg=1 tab,    Active      [facility] [place]



   PO, BID, 0          2018  Medical



   Refill(s)            Center



               



               

 

 acetaminophen  1,000 mg=2 tab,    Active      [facility] [place]



 500 mg oral  PO, Q6Hnow, 0          2018  Medical



 tablet  Refill(s)            Center



               



               

 

 Oxycodone  5 mg=1 tab, PO,    Active      [facility] [place]



 Hydrochloride 5  Q4H, PRN Pain          2018  Medical



 MG Oral Tablet  Score 4-6, 0            Center



   Refill(s)            



               



               

 

 zinc sulfate  220 mg=1 cap,    Active      [facility] [place]



 220 mg oral  PO, Daily, 0          2018  Medical



 capsule  Refill(s)            Center



               



               

 

 multivitamin  1 tab, PO,    Active      [facility] [place]



   Daily, 0          2018  Medical



   Refill(s)            Center



               



               

 

 methocarbamol  1,000 mg=2 tab,    Active      [facility] [place]



 500 mg oral  PO, Q8H, 0          2018  Medical



 tablet  Refill(s)            [place]



               



               

 

 LORazepam 0.5  0.5 mg=1 tab,    Active        [facility] [place]



 mg oral tablet  PO, Q8H, PRN          2018  Medical



   Anxiety, 0            Center



   Refill(s)            



               



               

 

 Lidocaine  3 patch, TOP,    Active        [facility] [place]



 Hydrochloride  Daily, Remove          2018  Medical



 0.05 MG/MG  after 12 hours,            Center



 Transdermal  0 Refill(s)            



 Patch              



 [Lidoderm]              



               



               

 

 Robaxin  1,000 mg, 2    No Longer        [facility]



   tab, Route: PO,    Active        [facility]



   Drug form: TAB,            Center



   Q8H, Dosing            



   Weight 127.027,            



   kg, Start date:            



   18            



   16:00:00 CDT,            



   Duration: 30            



   day, Stop date:            



   18            



   8:00:00            



   CDTNotes: (Same            



   as:Robaxin)            



               



               

 

 Oxycodone  10 mg, 2 tab,    No Longer        [facility] [place]



 Hydrochloride 5  Route: PO, Drug    Active      2018  Medical



 MG Oral Tablet  form: TAB,            Center



   Daily, Dosing            



   Weight 127.027,            



   kg, PRN            



   Procedure,            



   Start date:            



   18            



   13:06:00 CDT,            



   Duration: 30            



   day, Stop date:            



   18            



   13:05:00            



   CDTNotes: (Same            



   as: Roxicodone)            



               



               

 

 Ativan  0.5 mg, 1 tab,    No Longer        [facility]



   Route: PO, Drug    Active        [facility]



   form: TAB, Q8H,            Center



   Dosing Weight            



   127.027, kg,            



   PRN Anxiety,            



   Start date:            



   18            



   10:18:00 CDT,            



   Duration: 7            



   day, Stop date:            



   18            



   10:17:00            



   CDTNotes: (Same            



   as: Ativan)            



               



               

 

 Trazodone  50 mg, 1 tab,    No Longer        [facility] [place]



 Hydrochloride  Route: PO, Drug    Active      2018  Medical



 50 MG Oral  form: TAB,            Center



 Tablet  Bedtime, Dosing            



   Weight 127.027,            



   kg, Start date:            



   18            



   21:00:00 CDT,            



   Duration: 30            



   day, Stop date:            



   18            



   21:00:00            



   CDTNotes: (Same            



   As: Desyrel)            



               



               

 

 remove patch  3 patch, Route:    No Longer        [facility] [place]



   TOP, Bedtime,    Active        Medical



   Drug form:            Center



   ERFILM, Start            



   date: 18            



   21:00:00 CDT,            



   Duration: 30            



   day, Stop date:            



   18            



   21:00:00            



   CDTNotes:            



   Remove patch 12            



   hours after            



   application            



   each day.            



               



               

 

 Oxycodone  10 mg, 2 tab,    Inactive        [facility] [place]



 Hydrochloride 5  Route: PO, Drug          2018  Medical



 MG Oral Tablet  form: TAB,            Center



   ONCE, Dosing            



   Weight 127.027,            



   kg, Start date:            



   18            



   17:01:00 CDT,            



   Stop date:            



   18            



   17:01:00            



   CDTNotes: (Same            



   as: Roxicodone)            



               



               

 

 Celebrex  200 mg, 1 cap,    No Longer         [place]



   Route: PO, Drug    Active      2018  Medical



   form: CAP, BID,            [place]



   Dosing Weight            



   127.027, kg,            



   Start date:            



   18            



   17:00:00 CDT,            



   Duration: 30            



   day, Stop date:            



   18            



   9:00:00            



   CDTNotes:            



   NSAID. Please            



   check            



   indication. Not            



   for seizure.            



   (Same As:            



   CeleBREX)            



               



               

 

 Vancomycin  1,500 mg, 250    No Longer         [place]



   mL, Route:    Active      2018  Medical



   IVPB, Drug            Center



   form: INJ,            



   [id number], Dosing            



   Weight 127.27,            



   kg, Start date:            



   18            



   16:00:00 CDT,            



   Stop date:            



   05/10/18            



   8:00:00 CDT,            



   ABX Indication:            



   Skin/Soft            



   Tissue            



   InfectionNotes:            



   TIME CRITICAL            



   MEDICATION Same            



   as: Vancocin-NS            



   (premixed)            



   Infusion rate            



   2001 mg: infuse            



   over 2.5 hours            



               



               

 

 Lidocaine  3 patch, Route:    No Longer         [place]



 Hydrochloride  TOP, Daily,    Active      2018  Medical



 0.05 MG/MG  Drug form:            [place]



 Transdermal  FILM, Start            



 Patch  date: 18            



 [Lidoderm]  9:00:00 CDT,            



   Duration: 7            



   day, Stop date:            



   18            



   9:00:00 CDT,            



   Remove after 12            



   hoursNotes:            



   Apply only once            



   for up to 12            



   hours in a            



   24-hour period            



   (12 hours on            



   and 12 hours            



   off). (Same as:            



   Lidoderm)            



   "Remove old            



   patch before            



   application of            



   new patch"            



               



               

 

 Phenergan  12.5 mg, 0.5    Inactive         [place]



   mL, Route:          2018  Medical



   IVPB, Drug            Center



   form: INJ,            



   ONCE, Dosing            



   Weight 127.027,            



   kg, Priority:            



   NOW, Start            



   date: 18            



   17:40:00 CDT,            



   Stop date:            



   18            



   17:40:00            



   CDTNotes: Do            



   not give IV            



   push.  (Same            



   as: Phenergan)            



               



               

 

 Dilaudid  0.5 mg, 0.25    Inactive       [place]



   mL, Route: IVP,            Medical



   Drug form: INJ,            Center



   ONCE, Dosing            



   Weight 127.027,            



   kg, Priority:            



   NOW, Start            



   date: 18            



   17:40:00 CDT,            



   Stop date:            



   18            



   17:40:00            



   CDTNotes: Same            



   as Dilaudid            



               



               

 

 Tramadol  100 mg, 2 tab,    No Longer        [facility]



   Route: PO, Drug    Active        [facility]



   form: TAB,            Center



   Q6Hnow, Dosing            



   Weight 127.027,            



   kg, Start date:            



   18            



   17:00:00 CDT,            



   Duration: 30            



   day, Stop date:            



   18            



   11:00:00            



   CDTNotes: Not            



   to exceed            



   400mg/day.            



   (Same As:            



   Ultram)            



               

 

 gabapentin  600 mg, 2 cap,    No Longer        [facility]



   Route: PO, Drug    Active        [facility]



   form: CAP,            Center



   Q8Hnow, Dosing            



   Weight 127.027,            



   kg, Start date:            



   18            



   17:00:00 CDT,            



   Stop date:            



   18            



   9:00:00            



   CDTNotes: (Same            



   as: Neurontin)            



               



               

 

 Acetaminophen  1,000 mg, 2    No Longer        [facility]



   tab, Route: PO,    Active      2018  Medical



   Drug form: TAB,            Center



   Q6Hnow, Dosing            



   Weight 127.027,            



   kg, Start date:            



   18            



   17:00:00 CDT,            



   Duration: 30            



   day, Stop date:            



   18            



   11:00:00            



   CDTNotes: Max            



   acetaminophen            



   4000 mg/day (4            



   gm/day).  (Same            



   as: Tylenol            



   Extra Strength)            



               



               

 

 Robaxin  500 mg, 1 tab,    No Longer        [facility]



   Route: PO, Drug    Active        Medical



   form: TAB, TID,            Center



   Dosing Weight            



   127.027, kg,            



   Start date:            



   18            



   17:00:00 CDT,            



   Duration: 30            



   day, Stop date:            



   18            



   13:00:00            



   CDTNotes: (Same            



   as:Robaxin)            



               



               

 

 Oxycodone  5 mg, 1 tab,    No Longer        [facility] [place]



 Hydrochloride 5  Route: PO, Drug    Active      2018  Medical



 MG Oral Tablet  form: TAB, Q4H,            Center



   Dosing Weight            



   127.027, kg,            



   PRN Pain Score            



   4-6, Start            



   date: 18            



   16:33:00 CDT,            



   Duration: 30            



   day, Stop date:            



   18            



   16:32:00            



   CDTNotes: (Same            



   as: Roxicodone)            



               



               

 

 Beneprotein 7  2 pkt, Route:    No Longer         [place]



 gm pkt  PO, Drug Form:    Active      2018  Medical



   PWDR, Dosing            Center



   Weight 127.027,            



   kg, BID-Before            



   Meals, Start            



   date: 18            



   16:30:00 CDT,            



   Duration: 30            



   day, Stop date:            



   18            



   7:30:00            



   CDTNotes: (Same            



   as:            



   Beneprotein)            



               



               

 

 Acetaminophen  1 tab, PO, TID,    No Longer         [place]



 325 MG /  0 Refill(s)    Active      2018  Medical



 Oxycodone              [place]



 Hydrochloride              



 10 MG Oral              



 Tablet              



 [Percocet              



 10/325]              



               

 

 Dilaudid  0.5 mg, 0.25    Inactive         [place]



   mL, Route: IVP,          2018  Medical



   Drug form: INJ,            Center



   ONCE, Start            



   date: 18            



   11:01:00 CDT,            



   Stop date:            



   18            



   11:01:00 CDT            



               



               

 

 Phenergan  25 mg, 1 tab,    Inactive         [place]



   Route: PO, Drug          2018  Medical



   form: TAB, Q6H,            Center



   Dosing Weight            



   127.027, kg,            



   PRN Nausea &            



   Vomiting, Start            



   date: 18            



   10:43:00 CDT,            



   Duration: 30            



   day, Stop date:            



   18            



   10:42:00            



   CDTNotes: (Same            



   as: Phenergan)            



               



               

 

 Dilaudid  2 mg, Route:    Inactive         [place]



   IVP, ONCE,          2018  Medical



   Dosing Weight            Center



   127.027, kg,            



   Priority: STAT,            



   Start date:            



   18            



   10:43:00 CDT,            



   Stop date:            



   18            



   10:43:00 CDT            



               



               

 

 Docusate  100 mg, 1 cap,    No Longer        [facility]



   Route: PO, Drug    Active        Medical



   form: CAP, BID,            Center



   Dosing Weight            



   127.27, kg,            



   Start date:            



   18            



   9:00:00 CDT,            



   Duration: 30            



   day, Stop date:            



   18            



   17:00:00            



   CDTNotes: (Same            



   as: Colace) (Do            



   Not Crush)            



               



               

 

 Zinc Sulfate  220 mg, 1 cap,    No Longer        [facility]



   Route: PO, Drug    Active      2018  Medical



   form: CAP,            Center



   Daily, Dosing            



   Weight 127.27,            



   kg, Start date:            



   18            



   9:00:00 CDT,            



   Duration: 30            



   day, Stop date:            



   18            



   9:00:00            



   CDTNotes: (Zinc            



   sulfate            



   capsule) - 220            



   mg Zinc            



   sulfate=50 mg            



   elemental zinc            



   Same as Zinc            



   Sulfate            



               

 

 ascorbic acid  500 mg, 1 tab,    No Longer       [place]



   Route: PO, Drug    Active        Medical



   form: TAB, BID,            Center



   Dosing Weight            



   127.27, kg,            



   Start date:            



   18            



   9:00:00 CDT,            



   Duration: 30            



   day, Stop date:            



   18            



   17:00:00            



   CDTNotes: (Same            



   as: Vitamin C)            



               



               

 

 multivitamin  1 tab, Route:    No Longer       [place]



   PO, Drug Form:    Active      2018  Medical



   TAB, Dosing            Center



   Weight 127.27,            



   kg, Daily,            



   Start date:            



   18            



   9:00:00 CDT,            



   Duration: 30            



   day, Stop date:            



   18            



   9:00:00            



   CDTNotes: (Same            



   as:Thera)            



   WASTE: F/P -            



   Black; E -            



   Municipal Trash            



   Bin  Take with            



   food.            



               

 

 Naproxen  500 mg, 1 tab,    Inactive       [place]



   Route: PO, Drug            Medical



   form: TAB,            Center



   [id number], Dosing            



   Weight 127.27,            



   kg, Start date:            



   18            



   2:00:00 CDT,            



   Duration: 30            



   day, Stop date:            



   18            



   14:00:00            



   CDTNotes: (Same            



   as: Naprosyn)            



   Take with food.            



               



               

 

 Zosyn  3.375 gm,    No Longer        [facility]



   Route: IVPB,    Active      2018  Medical



   Drug form:            Center



   PDR/INJ,            



   ABXQ8H, Dosing            



   Weight 127.27,            



   kg, Start date:            



   18            



   2:00:00 CDT,            



   Stop date:            



   05/10/18            



   10:00:00 CDT,            



   ABX Indication:            



   Skin/Soft            



   Tissue            



   InfectionNotes:            



   (Same as:            



   Zosyn) Dosing            



   based on            



   Piperacillin            



   component   ***            



   MEDICATION            



   WASTE ***            



   Product Size:            



   3375 mg Product            



   Wasted:  ___ mg            



               



               

 

 Vancomycin  1,000 mg,    No Longer         [place]



   Route: IVPB,    Active        Medical



   Drug form: INJ,            Center



   ABXQ8H, Dosing            



   Weight 127.27,            



   kg, Start date:            



   18            



   2:00:00 CDT,            



   Duration: 7            



   day, Stop date:            



   05/10/18            



   18:00:00 CDT,            



   ABX Indication:            



   Skin/Soft            



   Tissue            



   InfectionNotes:            



   TIME CRITICAL            



   MEDICATION            



   (Same As:            



   Vancocin)            



   Infusion rate            



   2001 mg: infuse            



   over 2.5 hours            



   For adult            



   patients only:            



   Round to            



   nearest 250 mg            



   per Medical            



   Staff approval            



    *** MEDICATION            



   WASTE ***            



   Product Size:            



   1000 mg Product            



   Wasted:  ___ mg            



               



               

 

 Enoxaparin  40 mg, 0.4 mL,    No Longer         [place]



   Route: SUB-Q,    Active        Medical



   Drug form: INJ,            Center



   [id number],            



   Dosing Weight            



   127.27, kg,            



   Consider for            



   obese patients,            



   Start date:            



   18            



   2:00:00 CDT,            



   Stop date:            



   18            



   14:00:00            



   CDTNotes: (Same            



   as: Lovenox)            



               



               

 

 Sodium Chloride  1,000 mL, Rate:    No Longer         [place]



 0.9% IV 1,000  125 ml/hr,    Active        Medical



 mL  Infuse over: 8            Center



   hr, Route: IV,            



   Dosing Weight            



   127.27 kg,            



   Total Volume:            



   1,000, Start            



   date: 18            



   1:46:00 CDT,            



   Duration: 30            



   day, Stop date:            



   18            



   1:45:00 CDT,            



   2.44, m2            



               

 

 Saline Flush  10 ml, Route:    No Longer         [place]



 0.9%  IVP, Drug Form:    Active      2018  Medical



   INJ, Dosing            Center



   Weight 127.27,            



   kg, PRN, PRN            



   Line Flush,            



   Start date:            



   18            



   1:46:00 CDT,            



   Duration: 30            



   day, Stop date:            



   18            



   1:45:00            



   CDTNotes: (Same            



   as: BD            



   Posiflush)            



               



               

 

 Acetaminophen  325 mg, 1 tab,    Inactive         [place]



   Route: PO, Drug          2018  Medical



   form: TAB, Q4H,            Center



   Dosing Weight            



   127.27, kg, PRN            



   Pain Score 4-6,            



   Start date:            



   18            



   1:46:00 CDT,            



   Duration: 30            



   day, Stop date:            



   18            



   1:45:00            



   CDTNotes: Do            



   not exceed 4            



   gm/day.  (Same            



   as: Tylenol)            



               



               

 

 Acetaminophen  1 tab, Route:    Inactive         [place]



 325 MG /  PO, Drug Form:          2018  Medical



 Hydrocodone  TAB, Dosing            Center



 Bitartrate 5 MG  Weight 127.27,            



 Oral Tablet  kg, Q4H, PRN            



   Pain Score 4-6,            



   Start date:            



   18            



   1:46:00 CDT,            



   Duration: 30            



   day, Stop date:            



   18            



   1:45:00            



   CDTNotes: (Same            



   as: Norco            



   325/5)  Do not            



   exceed 4gm/day            



   of            



   acetaminophen.            



               



               

 

 Reglan  10 mg, 2 mL,    Inactive         [place]



   Route: IVP,            Medical



   Drug form: INJ,            Center



   ONCE, Dosing            



   Weight 127.273,            



   kg, Priority:            



   STAT, Start            



   date: 18            



   23:27:00 CDT,            



   Stop date:            



   18            



   23:27:00            



   CDTNotes: (Same            



   as: Reglan)            



               



               

 

 Benadryl  25 mg, 0.5 mL,    Inactive         [place]



   Route: IVP,          2018  Medical



   Drug form: INJ,            Center



   ONCE, Dosing            



   Weight 127.273,            



   kg, Priority:            



   STAT, Start            



   date: 18            



   23:27:00 CDT,            



   Stop date:            



   18            



   23:27:00            



   CDTNotes: (Same            



   as: Benadryl)            



               



               

 

 Magnesium  2 gm, 50 mL,    Inactive        [facility]



 Sulfate  Route: IV, Drug            Medical



   form: INJ,            Center



   ONCE, Dosing            



   Weight 127.273,            



   kg, Priority:            



   STAT, Start            



   date: 18            



   23:26:00 CDT,            



   Stop date:            



   18            



   23:26:00            



   CDTNotes:            



   WASTE: F/P -            



   Sink; E -            



   Municipal Trash            



   Bin            



               

 

 Sodium Chloride  1,000 mL, 1000    Inactive       [place]



 0.9% (Bolus) IV  ml/hr, Infuse          2018  Medical



   Over: 1 hr,            Center



   Route: IV,            



   1,000, Drug            



   form: INJ,            



   ONCE, Priority:            



   STAT, Dosing            



   Weight 127.273            



   kg, Start date:            



   18            



   23:26:00 CDT,            



   Stop date:            



   18            



   23:26:00 CDT            



               



               

 

 Zosyn  4.5 gm, Route:    Inactive         [place]



   IVPB, ONCE,          2018  Medical



   Dosing Weight            Center



   127.273, kg,            



   Priority: STAT,            



   Start date:            



   18            



   23:10:00 CDT,            



   Stop date:            



   18            



   23:10:00 CDT,            



   ABX Indication:            



   Bacteremia            



               



               

 

 Vancomycin  2,000 mg,    Inactive         [place]



   Route: IVPB,          2018  Medical



   ONCE, Dosing            Center



   Weight 127.273,            



   kg, Priority:            



   STAT, Start            



   date: 18            



   23:10:00 CDT,            



   Stop date:            



   18            



   23:10:00 CDT,            



   ABX Indication:            



   BacteremiaNotes            



   : TIME CRITICAL            



   MEDICATION            



   (Same As:            



   Vancocin)            



   Infusion rate            



   2001 mg: infuse            



   over 2.5 hours            



   For adult            



   patients only:            



   Round to            



   nearest 250 mg            



   per Medical            



   Staff approval            



    *** MEDICATION            



   WASTE ***            



   Product Size:            



   1000 mg Product            



   Wasted:  ___ mg            



               



               

 

 normal saline  1,000 mL, Rate:    No Longer       [place]



 0.9% IV 1,000  75 ml/hr,    Active      2018  Medical



 mL  Infuse over:            Center



   13.3 hr, Route:            



   IV, Dosing            



   Weight 127.273            



   kg, Total            



   Volume: 1,000,            



   Start date:            



   18            



   22:39:00 CDT,            



   Duration: 30            



   day, Stop date:            



   18            



   22:38:00 CDT,            



   2.36, m2            



               

 

 Acetaminophen  1,000 mg, 2    Inactive         [place]



   tab, Route: PO,          2018  Medical



   Drug form: TAB,            Center



   ONCE, Dosing            



   Weight 127.273,            



   kg, Start date:            



   18            



   21:56:00 CDT,            



   Stop date:            



   18            



   21:56:00            



   CDTNotes: Max            



   acetaminophen            



   4000 mg/day (4            



   gm/day).  (Same            



   as: Tylenol            



   Extra Strength)            



               



               

 

 Rocephin  1 gm, Route:    Inactive      [facility]



   IVP, Drug form:          2018  Medical



   PDR/INJ, ONCE,            Center



   Dosing Weight            



   127.273, kg,            



   Priority: STAT,            



   Start date:            



   18            



   21:21:00 CDT,            



   Stop date:            



   18            



   21:21:00 CDT,            



   ABX Indication:            



   Urinary Tract            



   InfectionNotes:            



   (Same As:            



   Rocephin).            



   *** MEDICATION            



   WASTE ***            



   Product Size:            



   1000 mg Product            



   Wasted:  _0__            



   mg            



               

 

 Morphine  4 mg, Route:    Inactive      [facility]



   IVP, ONCE,          2018  Medical



   Dosing Weight            Center



   127.273, kg,            



   Priority: STAT,            



   Start date:            



   18            



   19:05:00 CDT,            



   Stop date:            



   18            



   19:05:00 CDT            



               



               

 

 Benadryl  25 mg, 0.5 mL,    Inactive      [facility]



   Route: IVP,            Medical



   Drug form: INJ,            Center



   ONCE, Dosing            



   Weight 127.273,            



   kg, Priority:            



   STAT, Start            



   date: 18            



   18:14:00 CDT,            



   Stop date:            



   18            



   18:14:00            



   CDTNotes: (Same            



   as: Benadryl)            



               



               

 

 Benadryl  50 mg, 2 cap,    Inactive      [facility]



   Route: PO, Drug            Medical



   form: CAP,            Center



   ONCE, Dosing            



   Weight 127.273,            



   kg, Priority:            



   STAT, Start            



   date: 18            



   17:56:00 CDT,            



   Stop date:            



   18            



   17:56:00            



   CDTNotes: (Same            



   as: Benadryl)            



               



               

 

 Morphine  4 mg, 1 mL,    Inactive      [facility]



   Route: IVP,            Medical



   Drug form:            Center



   SOLN, ONCE,            



   Dosing Weight            



   127.273, kg,            



   Priority: STAT,            



   Start date:            



   18            



   17:56:00 CDT,            



   Stop date:            



   18            



   17:56:00 CDT            



               



               







Allergies, Adverse Reactions, Alerts







 Substance  Category  Reaction  Severity  Reaction  Status  Date  Comments  
Source



         type    Reported    



                 



                 



                 

 

 amoxicillin  Assertion      Drug  Active      [facility]



                 



                 

 

 morphine  Assertion      Drug  Active      [facility]



                 



                 

 

 Toradol  Assertion      Drug  Active      [facility]



                 



                 

 

 Minocin  Assertion      Drug  Active      [facility]



                 



                 

 

 Zofran  Assertion      Drug  Active      [facility]



                 



                 

 

 Levaquin  Assertion      Drug  Active      [facility]



                 



                 

 

 Bactrim  Assertion      Drug  Active      [facility]



                 



                 







Immunizations







 Immunization  Date Given  Site  Status  Last Updated  Comments  Source



             



             







Results







 Order Name  Results  Value  Reference  Date  Interpretation  Comments  Source



       [facility]        



               



               



               

 

 Brain-Outsi  Brain-Outside  EXAM: CT BRAIN WITHOUT CONTRAST -- OUTSIDE CONSULT
        -  [facility]



 de Consult  Consult CT          -  Medical



 CT            This report was dictated by a Radiology Resident/Fellow.  I have 
personally reviewed the images as  Center



             well as the Resident's interpretation and agree with the findings.
  



     DATE: 2018 4:49 AM CST        Read by:  Mauricio Cortes MD  
        Resident:  Mauricio Cortes MD  



             Dictated Date/time:  18 04:58  



             Electronically Signed by:  Radha Addison MD              
   18 07:46  



             FINAL REPORT  



     INDICATION: " - PAIN"          



               



               



               



     COMPARISON: Noncontrast head CTs 2018, 2015, 2013       
   



               



               



               



     TECHNIQUE: Noncontrast outside hospital CT submitted for 2nd 
interpretation. 205 images. Imaging was performed at [facility] on 2018.          



               



     IV contrast: None.          



               



               



               



     FINDINGS:          



               



               



               



     Overall unchanged exam. Right transfrontal ventriculostomy catheter is 
unchanged in position. 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 
size and configuration. The abandoned right transparie          



     hudson catheter is unchanged. Stigmata of Chiari II malformation.          



               



               



               



     There is no intracranial hemorrhage or extra-axial collection.          



               



               



               



     No new parenchymal density abnormality. No mass or mass effect. Unchanged 
from the tonsillar herniation.          



               



               



               



     The density of the larger intracranial sinuses is normal.          



               



               



               



               



               



     IMPRESSION: Unchanged examination compared to 2018          



               



               



               



     The final report agrees with the preliminary report by the radiology 
resident.          



               



               



               



               

 

 CHEM PANEL  B/C Ratio  17  6 - 25        [address]



               



               



               

 

 CHEM PANEL  Globulin  4.3 g/dL  2.7 - 4.2        [address]



               



               



               

 

 CHEM PANEL  A/G Ratio  0.7  0.7 - 1.6        [address]



               



               



               

 

 CHEM PANEL  AGAP  14.4 meq/L  10.0 -        [facility]



       20.0        [facility]



               



               



               

 

 CHEM PANEL  eGFR  113        Result Comment: The eGFR is calculated using 
the CKD-EPI formula. In most young, healthy individuals the eGFR will be >90 mL/
min/1.73m2. The eGFR declines with age. An eGFR of 60-89 may be normal in  [facility] 
[place]



     mL/min/1.    some populations, particularly the elderly, for 
whom the CKD-EPI formula has not been extensively validated. Use of the eGFR is 
not recommended in the following populations:  [address]



             Individuals with unstable creatinine concentrations, including 
pregnant patients and those with serious co-morbid conditions.  



               



             Patients with extremes in muscle mass or diet.  



               



             The data above are obtained from the National Kidney Disease 
Education Program (NKDEP) which additionally recommends that when the eGFR is 
used in patients with extremes of body mass index for purposes  



             of drug dosing, the eGFR should be multiplied by the estimated 
BMI.  

 

 CHEM PANEL  Alk Phos  76 unit/L  39 - 136        [address]



               



               



               

 

 CHEM PANEL  ALT  35 unit/L  0 - 65        [address]



               



               



               

 

 CHEM PANEL  Albumin Lvl  2.8 g/dL  3.5 - 5.0        [address]



               



               



               

 

 CHEM PANEL  Total Protein  7.1 g/dL  6.4 - 8.4        [address]



               



               



               

 

 CHEM PANEL  Calcium Lvl  8.7 mg/dL  8.5 - 10.5        [address]



               



               



               

 

 CHEM PANEL  AST  18 unit/L  0 - 37        [address]



               



               



               

 

 CHEM PANEL  Bili Total  0.3 mg/dL  0.2 - 1.3        [address]



               



               



               

 

 CHEM PANEL  Potassium Lvl  4.4 meq/L  3.5 - 5.1        [address]



               



               



               

 

 CHEM PANEL  Chloride Lvl  109 meq/L  95 - 109        [address]



               



               



               

 

 CHEM PANEL  CO2  23 meq/L  24 - 32        [address]



               



               



               

 

 CHEM PANEL  Glucose Lvl  114 mg/dL  70 - 99        [address]



               



               



               

 

 CHEM PANEL  Creatinine  1.01 mg/dL  0.50 -        [facility]



   Lvl    1.40  /      [facility]



               



               



               

 

 CHEM PANEL  BUN  17 mg/dL  7 - 22        [address]



               



               



               

 

 CHEM PANEL  Sodium Lvl  142 meq/L  135 - 145        [address]



               



               



               

 

 HEMATOLOGY  Basophils  0.6 %  0.0 - 1.0        [address]



               



               



               

 

 HEMATOLOGY  Segs-Bands #  4.8 K/CMM  1.5 - 8.1  05/       [place]



         /[address] [place] [address]



               



               



               

 

 HEMATOLOGY  Monocytes #  0.7 K/CMM  0.0 - 0.8  05      [address]



               



               



               

 

 HEMATOLOGY  Lymphocytes #  1.9 K/CMM  1.0 - 5.5  05      [address]



               



               



               

 

 HEMATOLOGY  Monocytes  9.4 %  2.0 - 12.0  /      [address]



               



               



               

 

 HEMATOLOGY  Eosinophils #  0.2 K/CMM  0.0 - 0.5        [address]



               



               



               

 

 HEMATOLOGY  Eosinophils  2.9 %  0.0 - 4.0  05/      [address]



               



               



               

 

 HEMATOLOGY  Segs  62.2 %  45.0 -        [facility]



       75.0        [facility]



               



               



               

 

 HEMATOLOGY  Lymphocytes  24.9 %  20.0 -        [facility]



       40.0        [facility]



               



               



               

 

 HEMATOLOGY  MCH  27.5 pg  27.0 -        [facility]



       31.0        [facility]



               



               



               

 

 HEMATOLOGY  MCV  85.3 fL  80.0 -        [facility]



       94.0        [facility]



               



               



               

 

 HEMATOLOGY  Hct  43.9 %  42.0 -         [place]



       54.0        [facility]



               



               



               

 

 HEMATOLOGY  Hgb  14.2 g/dL  14.0 -         [place]



       18.0        [facility]



               



               



               

 

 HEMATOLOGY  WBC  7.7 K/CMM  3.7 - 10.4        [facility]



               [facility]



               



               



               

 

 HEMATOLOGY  RBC  5.15 M/CMM  4.70 -         [place]



       6.10        [facility]



               



               



               

 

 HEMATOLOGY  MPV  8.4 fL  7.4 - 10.4         [place]



         [address]



               



               



               

 

 HEMATOLOGY  MCHC  32.3 g/dL  32.0 -         [place]



       36.0        [facility]



               



               



               

 

 HEMATOLOGY  RDW  17.3 %  11.5 -  05      [facility]



       14.5        [facility]



               



               



               

 

 HEMATOLOGY  Platelet  317 K/CMM  133 - 450  05      [address]



               



               



               

 

 CHEM PANEL  Globulin  4.4 g/dL  2.7 - 4.2        [address]



               



               



               

 

 CHEM PANEL  A/G Ratio  0.6  0.7 - 1.6        [address]



               



               



               

 

 CHEM PANEL  B/C Ratio  17  6 - 25  05      [address]



               



               



               

 

 CHEM PANEL  AGAP  11.3 meq/L  10.0 -        [facility]



       20.0        [facility]



               



               



               

 

 CHEM PANEL  eGFR  134        Result Comment: The eGFR is calculated using 
the CKD-EPI formula. In most young, healthy individuals the eGFR will be >90 mL/
min/1.73m2. The eGFR declines with age. An eGFR of 60-89 may be normal in  [facility] 
[place]



     mL/min/1.7        some populations, particularly the elderly, for 
whom the CKD-EPI formula has not been extensively validated. Use of the eGFR is 
not recommended in the following populations:  [address]



             Individuals with unstable creatinine concentrations, including 
pregnant patients and those with serious co-morbid conditions.  



               



             Patients with extremes in muscle mass or diet.  



               



             The data above are obtained from the National Kidney Disease 
Education Program (NKDEP) which additionally recommends that when the eGFR is 
used in patients with extremes of body mass index for purposes  



             of drug dosing, the eGFR should be multiplied by the estimated 
BMI.  

 

 CHEM PANEL  Creatinine  0.84 mg/dL  0.50 -        [facility]



   Lvl    1.40        [facility]



               



               



               

 

 CHEM PANEL  Sodium Lvl  142 meq/L  135 - 145        [address]



               



               



               

 

 CHEM PANEL  Glucose Lvl  99 mg/dL  70 - 99        [address]



               



               



               

 

 CHEM PANEL  BUN  14 mg/dL  7 - 22        [address]



               



               



               

 

 CHEM PANEL  Alk Phos  79 unit/L  39 - 136        [address]



               



               



               

 

 CHEM PANEL  Bili Total  0.3 mg/dL  0.2 - 1.3        [address]



               



               



               

 

 CHEM PANEL  AST  14 unit/L  0 - 37        [address]



               



               



               

 

 CHEM PANEL  ALT  43 unit/L  0 - 65        [address]



               



               



               

 

 CHEM PANEL  Total Protein  7.1 g/dL  6.4 - 8.4        [address]



               



               



               

 

 CHEM PANEL  Albumin Lvl  2.7 g/dL  3.5 - 5.0        [address]



               



               



               

 

 CHEM PANEL  Calcium Lvl  9.2 mg/dL  8.5 - 10.5        [address]



               



               



               

 

 CHEM PANEL  CO2  21 meq/L  24 - 32        [address]



               



               



               

 

 CHEM PANEL  Potassium Lvl  4.3 meq/L  3.5 - 5.1        [address]



               



               



               

 

 CHEM PANEL  Chloride Lvl  114 meq/L  95 - 109  05       [place]



         /[address] [place] [address]



               



               



               

 

 HEMATOLOGY  MCHC  32.5 g/dL  32.0 -         [place]



       36.0        [facility]



               



               



               

 

 HEMATOLOGY  RDW  17.5 %  11.5 -  05       [place]



       14.5        [facility]



               



               



               

 

 HEMATOLOGY  Platelet  400 K/CMM  133 - 450  05       [place]



         [address]



               



               



               

 

 HEMATOLOGY  MPV  8.5 fL  7.4 - 10.4         [place]



         [address]



               



               



               

 

 HEMATOLOGY  WBC  6.6 K/CMM  3.7 - 10.4         [place]



         [address]



               



               



               

 

 HEMATOLOGY  RBC  5.17 M/CMM  4.70 -        [facility]



       6.10        [facility]



               



               



               

 

 HEMATOLOGY  MCV  86.1 fL  80.0 -        [facility]



       94.0        [facility]



               



               



               

 

 HEMATOLOGY  Hct  44.5 %  42.0 -        [facility]



       54.0        [facility]



               



               



               

 

 HEMATOLOGY  MCH  28.0 pg  27.0 -        [facility]



       31.0        [facility]



               



               



               

 

 HEMATOLOGY  Hgb  14.5 g/dL  14.0 -        [facility]



       18.0        [facility]



               



               



               

 

 HEMATOLOGY  Lymphocytes #  1.7 K/CMM  1.0 - 5.5        [address]



               



               



               

 

 HEMATOLOGY  Monocytes #  0.7 K/CMM  0.0 - 0.8        [address]



               



               



               

 

 HEMATOLOGY  Eosinophils #  0.2 K/CMM  0.0 - 0.5        [address]



               



               



               

 

 HEMATOLOGY  Lymphocytes  26.1 %  20.0 -         [place]



       40.0        [facility]



               



               



               

 

 HEMATOLOGY  Segs  59.3 %  45.0 -        [facility]



       75.0        [facility]



               



               



               

 

 HEMATOLOGY  Basophils  0.8 %  0.0 - 1.0        [facility]



         [address] [place] [address]



               



               



               

 

 HEMATOLOGY  Monocytes  10.5 %  2.0 - 12.0        [facility]



         [address] [place] [address]



               



               



               

 

 HEMATOLOGY  Eosinophils  3.3 %  0.0 - 4.0        [address]



               



               



               

 

 HEMATOLOGY  Segs-Bands #  3.9 K/CMM  1.5 - 8.1        [address]



               



               



               

 

 TOXICOLOGY  Vanco Tr TND  15:30pm          [facility]



         [address] [place] [address]



               



               



               

 

 TOXICOLOGY  Vanco Tr  9.1 ug/ml          [facility]



         [address] [place] [address]



               



               



               

 

 CHEM PANEL  Glucose Lvl  74 mg/dL  70 - 99        [address]



               



               



               

 

 CHEM PANEL  BUN  12 mg/dL  7 - 22        [address]



               



               



               

 

 CHEM PANEL  Creatinine  0.75 mg/dL  0.50 -        [facility]



   Lvl    1.40  /      [facility]



               



               



               

 

 CHEM PANEL  eGFR  140        Result Comment: The eGFR is calculated using 
the CKD-EPI formula. In most young, healthy individuals the eGFR will be >90 mL/
min/1.73m2. The eGFR declines with age. An eGFR of 60-89 may be normal in  [facility] 
[place]



     mL/min/1.7        some populations, particularly the elderly, for 
whom the CKD-EPI formula has not been extensively validated. Use of the eGFR is 
not recommended in the following populations:  [address]



             Individuals with unstable creatinine concentrations, including 
pregnant patients and those with serious co-morbid conditions.  



               



             Patients with extremes in muscle mass or diet.  



               



             The data above are obtained from the National Kidney Disease 
Education Program (NKDEP) which additionally recommends that when the eGFR is 
used in patients with extremes of body mass index for purposes  



             of drug dosing, the eGFR should be multiplied by the estimated 
BMI.  

 

 CHEM PANEL  Albumin Lvl  2.9 g/dL  3.5 - 5.0        [address]



               



               



               

 

 CHEM PANEL  Globulin  4.4 g/dL  2.7 - 4.2        [address]



               



               



               

 

 CHEM PANEL  A/G Ratio  0.7  0.7 - 1.6        [address]



               



               



               

 

 CHEM PANEL  Bili Total  0.4 mg/dL  0.2 - 1.3        [address]



               



               



               

 

 CHEM PANEL  Alk Phos  71 unit/L  39 - 136        [address]



               



               



               

 

 CHEM PANEL  AST  15 unit/L  0 - 37        [address]



               



               



               

 

 CHEM PANEL  ALT  28 unit/L  0 - 65        [address]



               



               



               

 

 CHEM PANEL  Potassium Lvl  4.4 meq/L  3.5 - 5.1        [address] [address]



               



               



               

 

 CHEM PANEL  Sodium Lvl  147 meq/L  135 - 145        [address]



               



               



               

 

 CHEM PANEL  CO2  25 meq/L  24 - 32        [address]



               



               



               

 

 CHEM PANEL  Chloride Lvl  114 meq/L  95 - 109        [address]



               



               



               

 

 CHEM PANEL  B/C Ratio  16  6 - 25        [address]



               



               



               

 

 CHEM PANEL  Calcium Lvl  8.7 mg/dL  8.5 - 10.5        [address]



               



               



               

 

 CHEM PANEL  AGAP  12.4 meq/L  10.0 -        [facility]



       20.0        [facility]



               



               



               

 

 CHEM PANEL  Total Protein  7.3 g/dL  6.4 - 8.4        [address]



               



               



               

 

 HEMATOLOGY  Platelet  345 K/CMM  133 - 450  05      [address]



               



               



               

 

 HEMATOLOGY  MPV  8.7 fL  7.4 - 10.4        [address]



               



               



               

 

 HEMATOLOGY  WBC  6.9 K/CMM  3.7 - 10.4        [address]



               



               



               

 

 HEMATOLOGY  RBC  5.30 M/CMM  4.70 -        [facility]



       6.10        [facility]



               



               



               

 

 HEMATOLOGY  MCHC  32.8 g/dL  32.0 -        [facility]



       36.0        [facility]



               



               



               

 

 HEMATOLOGY  MCH  27.9 pg  27.0 -        [facility]



       31.0        [facility]



               



               



               

 

 HEMATOLOGY  Hgb  14.8 g/dL  14.0 -        [facility]



       18.0        [facility]



               



               



               

 

 HEMATOLOGY  MCV  85.0 fL  80.0 -        [facility]



       94.0        [facility]



               



               



               

 

 HEMATOLOGY  Hct  45.1 %  42.0 -        [facility]



       54.0        [facility]



               



               



               

 

 HEMATOLOGY  RDW  17.5 %  11.5 -        [facility]



       14.5        [facility]



               



               



               

 

 HEMATOLOGY  Eosinophils #  0.2 K/CMM  0.0 - 0.5  05      [address]



               



               



               

 

 HEMATOLOGY  Lymphocytes #  2.0 K/CMM  1.0 - 5.5  05      [address]



               



               



               

 

 HEMATOLOGY  Monocytes #  0.7 K/CMM  0.0 - 0.8  05      [address]



               



               



               

 

 HEMATOLOGY  Eosinophils  2.4 %  0.0 - 4.0  05      [address]



               



               



               

 

 HEMATOLOGY  Basophils  0.6 %  0.0 - 1.0        [address]



               



               



               

 

 HEMATOLOGY  Segs-Bands #  4.0 K/CMM  1.5 - 8.1  05      [address]



               



               



               

 

 HEMATOLOGY  Monocytes  10.4 %  2.0 - 12.0        [facility]



         [address] [place] [address]



               



               



               

 

 HEMATOLOGY  RBC Morph  Normal           [place]



         /2018      [facility]



     (18 2:07 AM)          [place]



               



               



               

 

 HEMATOLOGY  Segs  58.1 %  45.0 -        [facility]



       75.0        [facility]



               



               



               

 

 HEMATOLOGY  Plt Morph  Normal          [facility]      [facility]



     (18 2:07 AM)          [place]



               



               



               

 

 HEMATOLOGY  Lymphocytes  28.5 %  20.0 -        [facility]



       40.0        [facility]



               



               



               

 

 HEMATOLOGY  Basophils #  0.1 K/CMM  0.0 - 0.2        [address]



               



               



               

 

 HEMATOLOGY  Polychrom  Moderate  None Seen         [place]



         /2018      [facility]



     *ABN*          Center



               



     (18 11:07 AM)          



               

 

 TOXICOLOGY  Vanco Tr TND  *          [address]



               



               



               

 

 TOXICOLOGY  Vanco Tr  22.3 ug/ml          [address]



               



               



               

 

 CHEM PANEL  Magnesium Lvl  2.3 mg/dL  1.8 - 2.4        [address]



               



               



               

 

 CHEM PANEL  Phosphorus  3.5 mg/dL  2.5 - 4.5        [address]



               



               



               

 

 HEMATOLOGY  PT  14.0 s  12.0 -        [facility]



       14.      [facility]



               



               



               

 

 HEMATOLOGY  PTT  37.6 s  .9 -        [facility]



       35.      [facility]



               



               



               

 

 HEMATOLOGY  INR  1.08  0.85 -        [facility]



             [facility]



               



               



               

 

 IMMUNOLOGY  C-REACTIVE  13.1 mg/L  <=2.9 mg/L        [facility]



   PROTEIN      [address]



               



               



               

 

 IMMUNOLOGY  Prealbumin  25.2 mg/dL  18.0 -        [facility]



       45.0        [facility]



               



               



               

 

 CHEM PANEL  Lactic Acid  0.9 mMol/L  0.5 - 2.2        [facility]            [facility]



               



               



               

 

 HEMATOLOGY  Sed Rate  5 mm/h  0 - 15        [address]



               



               



               

 

 IMMUNOLOGY  C-REACTIVE  15.8 mg/L  <=2.9 mg/L        [address]



               



               



               

 

 HEMATOLOGY  PT  13.0 s  12.0 -        [facility]



       14.      [facility]



               



               



               

 

 HEMATOLOGY  INR  0.98  0.85 -        [facility]



       1.      [facility]



               



               



               

 

 HEMATOLOGY  PTT  33.2 s  22.9 -        [facility]



       35.      Medical



               Center



               



               



               

 

 BLOOD BANK  Antibody Scrn  Negative          [facility]



 RESULTS              [facility]



     (5/3/18 6:51 PM)          Center



               



               



               

 

 BLOOD BANK  ABO/Rh  AB POS          [facility]



 RESULTS              [facility]



               



               



               

 

 URINE AND  UA  0.2 EU/dL  0.1 - 1.0        [facility]  Urobilinogen      /      [facility]



               



               



               

 

 URINE AND  UA Nitrite  Negative  Negative        [facility]      [facility]



     (5/3/18 6:35 PM)          [place]



               



               



               

 

 URINE AND  UA Glucose  Negative  Negative        [facility]



 STOOL              [facility]



     (5/3/18 6:35 PM)          [place]



               



               



               

 

 URINE AND  UA Ketones  Negative  Negative        [facility]              Medical



     *NA*          [place]



               



     (5/3/18 6:35 PM)          



               

 

 URINE AND  UA Bili  Negative  Negative        [facility]              Medical



     *NA*          [place]



               



     (5/3/18 6:35 PM)          



               

 

 URINE AND  UA Blood  Trace  Negative        [facility]              Medical



     *ABN*          [place]



               



     (5/3/18 6:35 PM)          



               

 

 URINE AND  UA Leuk Est  Small  Negative        [facility]              Medical



     *ABN*          [place]



               



     (5/3/18 6:35 PM)          



               

 

 URINE AND  UA Spec Grav  1.020  <=1.030        [address]



               



               



               

 

 URINE AND  UA pH  6.0  5.0 - 8.0        [facility]      [facility]



               



               



               

 

 URINE AND  UA Color  Yellow  Yellow        [facility]              Medical



     *NA*          [place]



               



     (5/3/18 6:35 PM)          



               

 

 URINE AND  UA Protein  Trace  Negative        [facility]              Medical



     *ABN*          [place]



               



     (5/3/18 6:35 PM)          



               

 

 URINE AND  UA Turbidity  Slight Cloudy  Clear        [facility]



 STOOL              [facility]



     (5/3/18 6:35 PM)          [place]



               



               



               

 

 URINE AND  UA Hyal Cast  0-2  0 - 2        [facility]              [facility]



     (5/3/18 6:35 PM)          [place]



               



               



               

 

 URINE AND  UA Bacteria  Occasional  None Seen        [facility]    /HPF  /HPF  /      [facility]



               



               



               

 

 URINE AND  UA RBC  11-20 /HPF  0 - 2        [address]



               



               



               

 

 URINE AND  UA Sq Epi  Occasional  Few /LPF        [facility]



 STOOL    /LPF    /      [facility]



               



               



               

 

 URINE AND  UA WBC    None Seen        [facility] [place]



 STOOL    /HPF  /HPF  /      [facility]



               



               



               

 

 HEMATOLOGY  Basophils #  0.1 K/CMM  0.0 - 0.2        [facility]



               [facility]



               



               



               

 

 HEMATOLOGY  Polychrom  Slight          [facility]



               [facility]



               



               



               

 

 HEMATOLOGY  Plt Morph  Normal          [facility]



               Medical



     (5/3/18 6:20 PM)          Center



               



               



               

 

 Brain shunt  Brain shunt  EXAM: SKULL 2 VIEWS        -  [facility] [place]



 series DX  series DX          -  Medical



     EXAM: CHEST 2 VIEWS        This report was dictated by a Radiology Resident
/Fellow.  I have personally reviewed the images as  Center



             well as the Resident's interpretation and agree with the findings.
  



     EXAM: ABDOMEN 2 VIEWS        Read by:  Bernardo Lorenz MD        
  Resident:  Bernardo Lorenz MD  



             Dictated Date/time:  18 20:33  



             Electronically Signed by:  Martin Phillips MD                      
   18 22:11  



             FINAL REPORT  



     DATE: 5/3/2018 7:20 PM CDT          



               



               



               



     INDICATION: Headache. - Please include Codman view for shunt setting.     
     



               



               



               



     COMPARISON: Brain shuntogram 2013          



               



               



               



     TECHNIQUE: AP and lateral views of the skull, chest and abdomen.          



               



               



               



     FINDINGS:          



               



     There is a single intact shunt tube with the proximal aspect in the right 
frontal calvarium coursing across midline to the left anterior chest and 
abdomen with the tip coiled within the pelvis.          



               



               



               



     A right parietal shunt with distal tip in the right lateral abdomen is 
discontinuous. Another shunt present with proximal tip in the right neck base 
and distal tip in the medial mid abdomen          



               



     Cholecystectomy clips are noted. The lungs are clear but underinflated. 
Appearance of the pelvis is unchanged with bilateral shallow acetabular roofs. 
No obvious posterior dislocation. Spinal dysraphism          



      is seen with absence of the spinous process from L3 to S1.          



               



               



               



     IMPRESSION:          



               



               



               



     1. No significant change. The right transfrontal shunt catheter is intact 
along its course with the distal tip in the pelvis.          



               



     2. Discontinuous right parieto-occipital catheter and 2 discontinuous 
catheters in the right chest and abdomen are unchanged.          



               



     3. Spinal dysraphism.          



               



               



               



               

 

 Brain wo  Brain wo  EXAM: CT BRAIN WITHOUT CONTRAST        -  [facility]



 contrast CT  contrast CT      /    -  Medical



             This report was dictated by a Radiology Resident/Fellow.  I have 
personally reviewed the images as  Center



             well as the Resident's interpretation and agree with the findings.
  



     DATE: 5/3/2018 627 PM CDT        Read by:  Bernardo Lorenz MD    
      Resident:  Bernardo Lorenz MD  



             Dictated Date/time:  18 18:45  



             Electronically Signed by:  Martin Phillips MD                      
   18 21:12  



             FINAL REPORT  



     INDICATION: 32-year-old male patient with history of  shunt malfunction 
         



               



               



               



     COMPARISON: CT of the brain 2015, 2013.          



               



               



               



     TECHNIQUE: Axial CT images of the brain were obtained. Sagittal and 
coronal reformats.          



               



     IV contrast: None.          



               



               



               



     FINDINGS:          



               



     An orphaned right occipital approach  shunt is present. Also present is 
a right frontal approach  shunt with tip in the left lateral ventricle.      
    



               



     Narrowing of the lateral ventricles is present, unchanged from 2015. 
         



               



     There is mild uncal and cerebellar tonsillar herniation, also unchanged.  
        



               



     No recent hemorrhage or territorial infarct. No mass. No midline shift.   
       



               



     No scalp fluid collections.          



               



     Sinuses are clear.          



               



               



               



     IMPRESSION:          



               



     No acute intracranial abnormality.          



               



     Adequately decompressed ventricular system.          



               



               



               



               

 

 Chest 1view  Chest 1view  EXAM: XR CHEST 1 VIEW        -  [facility]  DX      /    -  Medical



             This report was dictated by a Radiology Resident/Fellow.  I have 
personally reviewed the images as  Center



             well as the Resident's interpretation and agree with the findings.
  



     DATE: 5/3/2018 6:12 PM CDT        Read by:  Olvin Palacio MD          
       Resident:  Olvin Palacio MD  



             Dictated Date/time:  18 18:31  



             Electronically Signed by:  Bakari Interiano MD              
   18 19:29  



             FINAL REPORT  



     INDICATION:  - picc line use          



               



               



               



     UT SECTION: ER          



               



               



               



     COMPARISON: None.          



               



               



               



     TECHNIQUE: AP chest          



               



               



               



     FINDINGS:          



               



     Lines, tubes and hardware:          



               



     Left PICC tip at mid SVC.          



               



               



               



     Lungs and pleura: No pulmonary or pleural based abnormality is identified. 
Pulmonary vascularity is normal.          



               



               



               



     Heart and mediastinum: The heart size is normal for technique. The 
mediastinal contours are normal.          



               



               



               



     Bones: No acute bony abnormality is identified.          



               



               



               



     Upper abdomen: Normal.          



               



               



               



               



               



     IMPRESSION:          



               



     Left PICC tip at mid SVC.          



               



               



               



     No acute cardiopulmonary abnormality is observed.          



               



               



               



               







Vital Signs







 Vital Sign  Value  Date  Comments  Source



         

 

 Temperature Oral (F)  97.2 F  2018    [facility]



         

 

 Systolic (mm Hg)  127  2018    [facility]



         

 

 Diastolic (mm Hg)  85  2018    [facility]



         

 

 Heart Rate  81  2018    [facility]



         

 

 Respitory Rate  18  2018    [facility]



         

 

 Heart Rate  90  2018    [facility]



         

 

 Systolic (mm Hg)  106  2018    [facility]



         

 

 Diastolic (mm Hg)  71  2018    [facility]



         

 

 Respitory Rate  18  2018    [facility]



         

 

 Temperature Oral (F)  98.1 F  2018    [facility]



         

 

 Respitory Rate  18  2018    [facility]



         

 

 Systolic (mm Hg)  168  2018    [facility]



         

 

 Diastolic (mm Hg)  107  2018    [facility]



         

 

 Heart Rate  87  2018    [facility]



         

 

 Temperature Oral (F)  98.1 F  2018    [facility]



         

 

 Weight  127.027  2018    [facility]



         

 

 Weight  127.027  2018    [facility]



         

 

 Weight  127.027  2018    [facility]



         

 

 BMI Calculated  48.16  2018    [facility]



         

 

 Height  162.56 cm  2018    [facility]



         

 

 Height  149.86 cm  2018    [facility]



         

 

 BMI Calculated  56.67  2018    [facility]



         







Encounters







 Location  Location  Encounter  Encounter  Reason  Attending  ADM  DC  Status  
Source



   Details  Type  Number  For  Provider  Date  Date    



         Visit          



                   



                   



                   

 

 [facility]  [id number]    [place]      [facility]



 Jose Ulloah      [facility]



                   



                   



                   







Procedures







 Procedure  Code  Date  Perfomer  Comments  Source



           



           

 

 Cholecystectomy  [id number]        [facility]



           

 

 Shunt of cerebral  [id number]        [facility] [place]



 ventricle to          Medical



 extracranial site          Center

## 2018-11-24 NOTE — RAD REPORT
EXAM DESCRIPTION:  CT - [id number] - 11/24/2018 8:58 pm

 

CLINICAL HISTORY:  Fall, head and neck injury

 

COMPARISON:  CT head and neck April 2018, CT head November 8

 

TECHNIQUE:  Axial 5 mm thick images of the head were obtained.  Axial 2 mm thick images of the cervic
al spine were obtained with sagittal and coronal reconstruction images generated and reviewed.

 

All CT scans are performed using dose optimization technique as appropriate and may include automated
 exposure control or mA/KV adjustment according to patient size.

 

FINDINGS:

No intracranial hemorrhage, mass, edema or acute intracranial finding. No suspicion for acute infarct
ion.  shunt tubes are in place. Mastoid air cells and paranasal sinuses are clear. No globe or orbi
t abnormality seen. Ventricles are not enlarged. However, ventricular size has increased November 8. 
Early shunt malfunction would be possible. There is currently no cerebral edema or sulcal effacement.
 .

 

Cervical body height and alignment are normal. No disk space narrowing. No fracture or acute bony abn
ormality.

 

No paraspinal mass or hematoma.

 

IMPRESSION:  No hemorrhage, edema or mass lesion.

 

Ventricles are not grossly enlarged but have increased in size from November 8 where the ventricular 
system was decompressed.  Findings could reflect an early shot malfunction.

 

Negative CT cervical spine examination for acute or significant finding.

## 2018-11-24 NOTE — XMS REPORT
[facility]

 Created on:2018



Patient:Redd Dale

Sex:Male

:1985

External Reference #:[id number]





Demographics







 Address  [address] [place] [address]

 

 Phone  [phone]

 

 Preferred Language  en

 

 Marital Status  Unknown

 

 Pentecostalism Affiliation  Unknown

 

 Race  Black or 

 

 Ethnic Group  Not  or 









Author







 Organization  [facility]









Care Team Providers







 Name  Role  Phone

 

 Knox, Na  Provider Role  Unavailable









Allergies

No Known Allergies



Problems







 Problem Type  Condition  Code  Onset Dates  Condition Status

 

 Problem  Nicotine dependence  F17.200    Active

 

 Problem  Lumbar spina bifida with  Q05.2    Active



   hydrocephalus      

 

 Problem  Dependence on wheelchair  Z99.3    Active

 

 Problem  Fecal incontinence  R15.9    Active

 

 Problem  Foot ulcer  L97.509    Active

 

 Problem  Depression with anxiety  F41.8    Active

 

 Problem  Paraplegia  G82.20    Active

 

 Problem  Constipation  K59.00    Active

 

 Problem  Incontinence of urine  R32    Active

 

 Problem  Nausea alone  R11.0    Active

 

 Problem  Episodic tension-type headache, not  G44.219    Active



   intractable      

 

 Problem  Nonintractable episodic headache,  R51    Active



   unspecified headache type      

 

 Problem   (ventriculoperitoneal) shunt  Z98.2    Active



   status      







Medications

No Known Medications



Results

No Known Results



Summary Purpose

[employer] Submission

## 2018-11-24 NOTE — ER
Nurse's Notes                                                                                     

 [facility]                                                                

Name: Redd Dale Jr                                                                            

Age: 33 yrs                                                                                       

Sex: Male                                                                                         

: 1985                                                                                   

MRN: [id number]                                                                                   

Arrival Date: 2018                                                                          

Time: 19:37                                                                                       

Account#: [id number]                                                                            

Bed 28                                                                                            

Private MD:                                                                                       

Diagnosis: Migraine without aura                                                                  

                                                                                                  

Presentation:                                                                                     

                                                                                             

19:56 Presenting complaint: Patient states: "I FELL LAST NIGHT, MIDNIGHT, I TRIED TO TURN AND rv  

      ROLL WHEN I REALIZED HOW CLOSE I AM TO THE EDGE OF THE BED AND I JUST HIT THE FLOOR. I      

      HIT MY HEAD AND I GOT DIZZY.". Transition of care: patient was received from another        

      setting of care (home health care), [facility]. Onset of symptoms was         

      2018 at 00:00. Risk Assessment: Do you want to hurt yourself or someone        

      else? Patient reports no desire to harm self or others. Initial Sepsis Screen: Does the     

      patient meet any 2 criteria? No. Patient's initial sepsis screen is negative. Does the      

      patient have a suspected source of infection? No. Patient's initial sepsis screen is        

      negative. Care prior to arrival: None.                                                      

19:56 Method Of Arrival: Wheelchair                                                             

19:56 Acuity: AYESHA 3                                                                           rv  

                                                                                                  

Triage Assessment:                                                                                

20:02 Headache History: The patient has had previous headaches and this one is different than rv  

      previous episodes. General: Appears in no apparent distress. uncomfortable, Behavior is     

      calm, cooperative. Pain: Pain began 1 day ago. Also complains of DIZZINESS.                 

                                                                                                  

Historical:                                                                                       

- Allergies:                                                                                      

20:00 Amoxicillin;                                                                            rv  

20:00 Bactrim;                                                                                rv  

20:00 Ciprofloxacin;                                                                          rv  

20:00 CLAVULANIC ACID;                                                                        rv  

20:00 Doxycycline;                                                                            rv  

20:00 Levofloxacin;                                                                           rv  

20:00 Morphine;                                                                               rv  

20:00 Toradol;                                                                                rv  

20:00 TRIMETHOPRIM;                                                                           rv  

20:00 Vancomycin;                                                                             rv  

20:00 Zofran;                                                                                 rv  

- Home Meds:                                                                                      

20:00 Pepcid Oral [Active];                                                                   rv  

- PMHx:                                                                                           

20:00 Asthma; Cerebral Palsy; cluster headaches; decubitus ulcers on feet; GERD;              rv  

      Hydrocephalus; Hypertension; spina bifida;                                                  

- PSHx:                                                                                           

20:00 SHUNT REVISION ; AMPUTATION OF LOWER LIMB ;                                     rv  

                                                                                                  

- Immunization history:: Adult Immunizations up to date, Flu vaccine is up to date.               

- Social history:: Smoking status: Patient uses tobacco products, smokes one-half pack            

  cigarettes per day, Patient/guardian denies using alcohol, street drugs, The patient            

  lives with family.                                                                              

- Ebola Screening: : Patient negative for fever greater than or equal to 101.5 degrees            

  Fahrenheit, and additional compatible Ebola Virus Disease symptoms Patient denies               

  exposure to infectious person Patient denies travel to an Ebola-affected area in the            

  21 days before illness onset.                                                                   

- Family history:: not pertinent, pertinent for.                                                  

                                                                                                  

                                                                                                  

Screenin:01 Abuse screen: Denies threats or abuse. Denies injuries from another. Nutritional        rv  

      screening: No deficits noted. Tuberculosis screening: No symptoms or risk factors           

      identified. Fall Risk None identified.                                                      

                                                                                                  

Assessment:                                                                                       

20:01 General: Appears in no apparent distress. uncomfortable, Behavior is calm, cooperative. rv  

      Pain: Complains of pain in HEAD Pain currently is 9 out of 10 on a pain scale. Neuro:       

      Level of Consciousness is awake, alert, obeys commands, Oriented to person, place,          

      time, situation. Cardiovascular: Capillary refill < 3 seconds. Respiratory: Airway is       

      patent. GI: No signs and/or symptoms were reported involving the gastrointestinal           

      system. : No signs and/or symptoms were reported regarding the genitourinary system.      

      EENT: No signs and/or symptoms were reported regarding the EENT system. Derm: Skin is       

      intact. Musculoskeletal: No signs and/or symptoms reported regarding the                    

      musculoskeletal system.                                                                     

21:00 Reassessment: Patient appears in no apparent distress at this time.                     rv  

23:02 Reassessment: Patient appears in no apparent distress at this time. AWAITING EMS        rv  

      TRANSPORT.                                                                                  

                                                                                                  

Vital Signs:                                                                                      

20:00  / 94; Pulse 112; Resp 16; Temp 98.6; Pulse Ox 97% on R/A; Weight 131.54 kg; Pain rv  

      9/10;                                                                                       

21:00 BP 96 / 50; Pulse 92; Resp 17; Pulse Ox 96% on R/A;                                     rv  

23:03  / 55; Pulse 95; Resp 17; Pulse Ox 97% on R/A;                                    rv  

                                                                                                  

[place] Coma Score:                                                                               

20:57 Eye Response: spontaneous(4). Verbal Response: oriented(5). Motor Response: obeys       ma2 

      commands(6). Total: 15.                                                                     

                                                                                                  

ED Course:                                                                                        

19:37 Patient arrived in ED.                                                                  ds1 

19:41 Keya Pinon MD is Attending Physician.                                           ma2 

19:58 Triage completed.                                                                       rv  

20:02 Arm band placed on right wrist.                                                         rv  

20:02 Patient has correct armband on for positive identification. Call light in reach. Pulse  rv  

      ox on. NIBP on.                                                                             

20:23 Patient moved to CT.                                                                    cw1 

20:36 Inserted saline lock: 22 gauge in right antecubital area, using aseptic technique.      rv  

      Blood collected.                                                                            

20:58 CT completed. Patient moved back from CT.                                               cw1 

20:59 CT Head C Spine In Process Unspecified.                                                 EDMS

23:03 No provider procedures requiring assistance completed. IV discontinued, bleeding        rv  

      controlled, No redness/swelling at site. Pressure dressing applied.                         

                                                                                                  

Administered Medications:                                                                         

20:36 Drug: Benadryl 50 mg Route: IVP; Site: right antecubital;                               rv  

23:05 Follow up: Response: Pain is decreased                                                  rv  

21:00 Drug: NS 0.9% 1000 ml Route: IV; Rate: 1 bolus; Site: right antecubital;                rv  

23:05 Follow up: IV Status: Completed infusion                                                rv  

21:21 Drug: Reglan 10 mg Route: IVP; Site: right antecubital;                                 rv  

23:05 Follow up: Response: Pain is decreased                                                  rv  

21:31 CANCELLED (Patient Refused): Zofran 4 mg IVP once; over 2 minutes                       ma2 

21:45 Drug: Dilaudid 1 mg Route: IVP; Site: right antecubital;                                rv  

23:04 Follow up: Response: Pain is decreased                                                  rv  

                                                                                                  

                                                                                                  

Outcome:                                                                                          

21:55 Discharge ordered by MD.                                                                ma2 

23:03 Discharged to [facility]                                                  rv  

23:03 Condition: improved                                                                         

23:03 Discharge instructions given to patient, Instructed on discharge instructions, follow       

      up and referral plans. medication usage, Demonstrated understanding of instructions,        

      follow-up care, medications, Prescriptions given X 1.                                       

23:58 Patient left the ED.                                                                    rv  

                                                                                                  

Signatures:                                                                                       

Dispatcher [employer]                           EDMS                                                 

Lilly Sandhu Crystal                             cw1                                                  

Keya Pinon MD MD   ma2                                                  

Jj Haile RN                    RN   rv                                                   

                                                                                                  

**************************************************************************************************

## 2018-11-24 NOTE — XMS REPORT
Clinical Summary

 Created on:2018



Patient:Redd Dale

Sex:Male

:1985

External Reference #:[id number]





Demographics







 Address  [address]



   [place] [address]

 

 Home Phone  [phone]

 

 Preferred Language  English

 

 Marital Status  Unknown

 

 Anglican Affiliation  Unknown

 

 Race  Black or 

 

 Ethnic Group  Not  or 









Author







 Organization  [employer]

 

 Address  [address] [place] [address]









Support







 Name  Relationship  Address  Phone

 

 Sabrina Dale  Unavailable  [address]  [phone]



     [place] [address]  









Care Team Providers







 Name  Role  Phone

 

 Ta  Primary Care Provider  [phone]









Allergies







 Active Allergy  Reactions  Severity  Noted Date  Comments

 

 Sulfamethoxazole-Trimethoprim  Hives  High  2017  

 

 Levofloxacin  Hives  High  2017  

 

 Morphine  Hives  High  2017  

 

 Sesame Seed  Hives    2017  

 

 Ketorolac  Rash  High  2017  

 

 Vancomycin Analogues  Rash  High  2017  

 

 Ondansetron Hcl (Pf)  Nausea And Vomiting    2017  







Medications







 Medication  Sig  Dispensed  Refills  Start Date  End Date  Status

 

 oxyCODONE-acetaminophe  Take 1 tablet by    0      Active



 n (PERCOCET)  mg  mouth every 4          



 per tablet  (four) hours as          



   needed for Pain.          







Active Problems







 Problem  Noted Date

 

 Spina bifida  2017

 

 Pyelonephritis  2017







Social History







 Tobacco Use  Types  Packs/Day  Years Used  Date

 

 Current Every Day Smoker  Cigarettes  0.5    









 Tobacco Cessation: Ready to Quit: No









 Sex Assigned at Birth  Date Recorded

 

 Not on file  









 Job Start Date  Occupation  Industry

 

 Not on file  Not on file  Not on file









 Travel History  Travel Start  Travel End









 No recent travel history available.







Last Filed Vital Signs

Not on file



Plan of Treatment

Not on file



Results

Not on fileafter 2017



Insurance







 Payer  Benefit Plan / Group  Subscriber ID  Type  Phone  Address

 

 MEDICAID - MEDICAID  [employer]  xxxxxxxxx  Medicaid    



 [facility]      Non-Contracted    









 Guarantor Name  Account Type  Relation to  Date of  Phone  Billing Address



     Patient  Birth    

 

 Redd Dale  Personal/Family  Self  1985  [phone]  [address]



         (Home)  [address]







           [place]



           [address]







Advance Directives

For more information, please contact:[facility], [place] [phone]





 Code Status  Date Activated  Date Inactivated  Comments

 

 Full Code  2017  1:36 AM  2017  8:43 PM  









 This code status was determined by:  Patient

## 2018-11-24 NOTE — XMS REPORT
[facility]

 Created on:September 15, 2018



Patient:Redd Dale

Sex:Male

:1985

External Reference #:[id number]





Demographics







 Address  [address] [place] [address]

 

 Phone  [phone]

 

 Preferred Language  en

 

 Marital Status  Unknown

 

 Orthodox Affiliation  Unknown

 

 Race  Black or 

 

 Ethnic Group  Unknown









Author







 Organization  [employer]









Care Team Providers







 Name  Role  Phone

 

 Knox, Na  Provider Role  Unavailable









Allergies, Adverse Reactions, Alerts







 Substance  Reaction  Event Type

 

 Zofran  Info Not Available  Drug Allergy

 

 Morphine Sulfate ER  Info Not Available  Drug Allergy

 

 Levaquin  Info Not Available  Drug Allergy







Problems







 Problem Type  Condition  Code  Onset Dates  Condition Status

 

 Assessment  Blood tests for routine general  Z00.00    Active



   physical examination      

 

 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



   disorder      

 

 Problem  Constipation  K59.00    Active

 

 Assessment  Ulcer of left foot, unspecified  L97.529    Active



   ulcer stage      

 

 Problem  Paraplegia  G82.20    Active

 

 Assessment   (ventriculoperitoneal) shunt  Z98.2    Active



   status      

 

 Problem  Nausea alone  R11.0    Active

 

 Problem  Fecal incontinence  R15.9    Active

 

 Problem  Incontinence of urine  R32    Active

 

 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



   disorder      

 

 Problem  Mental disorder, not otherwise  F99    Active



   specified      

 

 Assessment  Depression with anxiety  F41.8    Active

 

 Assessment  Bipolar depression  F31.30    Active

 

 Problem  Bipolar depression  F31.30    Active

 

 Assessment  Lumbar spina bifida with  Q05.2    Active



   hydrocephalus      

 

 Problem  Blood tests for routine general  Z00.00    Active



   physical examination      

 

 Problem  Depression with anxiety  F41.8    Active

 

 Problem  Nausea and vomiting, intractability  R11.2    Active



   of vomiting not specified,      



   unspecified vomiting type      

 

 Problem  Ulcer of left foot, unspecified  L97.529    Active



   ulcer stage      

 

 Problem  Nonintractable episodic headache,  R51    Active



   unspecified headache type      

 

 Problem   (ventriculoperitoneal) shunt  Z98.2    Active



   status      

 

 Problem  Episodic tension-type headache, not  G44.219    Active



   intractable      

 

 Problem  Lumbar spina bifida with  Q05.2    Active



   hydrocephalus      

 

 Problem  Foot ulcer  L97.509    Active

 

 Problem  Nicotine dependence  F17.200    Active

 

 Problem  Dependence on wheelchair  Z99.3    Active







Medications







 Medication  Code  Code  Instructions  Start  End  Status  Dosage



   System      Date  Date    

 

 Macrobid  NDC  10116027298  100 MG Orally      Active  1 capsule



       every 12 hrs        with food

 

 Tylenol with  NDC  26646072088  300-60 MG      Active  1 tablet as



 Codeine #4      Orally every 6        needed



       hrs        

 

 Pantoprazole  NDC  [id number]  40 MG Orally      Active  1 tablet



 Sodium      Once a day        

 

 Citalopram  NDC  [id number]  10 MG Orally      Active  1 tablet



 Hydrobromide      Once a day        

 

 Collagenase  NDC  [address]-53  250 UNIT/GM      Active  1 application



       Externally        to affected



       Once a day        area

 

 Seroquel  NDC  [id number]  25 MG Orally      Active  1 tablet



       Once a day at        



       bedtime        







Results

No Known Results



Summary Purpose

[employer] Submission

## 2018-11-25 VITALS — OXYGEN SATURATION: 97 % | DIASTOLIC BLOOD PRESSURE: 55 MMHG | SYSTOLIC BLOOD PRESSURE: 107 MMHG

## 2018-11-25 VITALS — TEMPERATURE: 98.6 F

## 2019-01-01 NOTE — EKG
Test Date:    2018-10-11               Test Time:    13:57:05

Technician:   CORNELIA                                     

                                                     

MEASUREMENT RESULTS:                                       

Intervals:                                           

Rate:         89                                     

WA:           164                                    

QRSD:         90                                     

QT:           354                                    

QTc:          430                                    

Axis:                                                

P:            56                                     

WA:           164                                    

QRS:          42                                     

T:            24                                     

                                                     

INTERPRETIVE STATEMENTS:                                       

                                                     

Normal sinus rhythm with sinus arrhythmia

Normal ECG

Compared to ECG 05/15/2018 20:41:23

Sinus tachycardia no longer present



Electronically Signed On 10-12-18 08:15:37 CDT by Yaron Donohue
yes

## 2019-01-11 ENCOUNTER — HOSPITAL ENCOUNTER (EMERGENCY)
Dept: HOSPITAL 97 - ER | Age: 34
Discharge: HOME | End: 2019-01-11
Payer: COMMERCIAL

## 2019-01-11 VITALS — OXYGEN SATURATION: 95 % | SYSTOLIC BLOOD PRESSURE: 131 MMHG | DIASTOLIC BLOOD PRESSURE: 87 MMHG | TEMPERATURE: 98.3 F

## 2019-01-11 DIAGNOSIS — Q05.9: ICD-10-CM

## 2019-01-11 DIAGNOSIS — F17.210: ICD-10-CM

## 2019-01-11 DIAGNOSIS — G80.9: ICD-10-CM

## 2019-01-11 DIAGNOSIS — N10: Primary | ICD-10-CM

## 2019-01-11 LAB — UA COMPLETE W REFLEX CULTURE PNL UR: (no result)

## 2019-01-11 PROCEDURE — 81015 MICROSCOPIC EXAM OF URINE: CPT

## 2019-01-11 PROCEDURE — 96372 THER/PROPH/DIAG INJ SC/IM: CPT

## 2019-01-11 PROCEDURE — 87088 URINE BACTERIA CULTURE: CPT

## 2019-01-11 PROCEDURE — 99283 EMERGENCY DEPT VISIT LOW MDM: CPT

## 2019-01-11 PROCEDURE — 81003 URINALYSIS AUTO W/O SCOPE: CPT

## 2019-01-11 PROCEDURE — 87077 CULTURE AEROBIC IDENTIFY: CPT

## 2019-01-11 PROCEDURE — 87186 SC STD MICRODIL/AGAR DIL: CPT

## 2019-01-11 PROCEDURE — 87086 URINE CULTURE/COLONY COUNT: CPT

## 2019-01-11 NOTE — XMS REPORT
[facility]

 Created on:2018



Patient:Redd Dale

Sex:Male

:1985

External Reference #:[id number]





Demographics







 Address  [address]  [place] [address]

 

 Phone  [phone]

 

 Preferred Language  en

 

 Marital Status  Unknown

 

 Catholic Affiliation  Unknown

 

 Race  Black or 

 

 Ethnic Group  Not  or 









Author







 Organization  [employer]









Care Team Providers







 Name  Role  Phone

 

 Knox, Na  Provider Role  Unavailable









Allergies, Adverse Reactions, Alerts







 Substance  Reaction  Event Type

 

 Toradol  Info Not Available  Drug Allergy

 

 Sulfa  Info Not Available  Drug Allergy

 

 Zofran  Info Not Available  Drug Allergy

 

 Morphine Sulfate ER  Info Not Available  Drug Allergy

 

 Levaquin  Info Not Available  Drug Allergy







Problems







 Problem Type  Condition  Code  Onset Dates  Condition Status

 

 Problem  Nicotine dependence  F17.200    Active

 

 Problem  Lumbar spina bifida with  Q05.2    Active



   hydrocephalus      

 

 Problem  Dependence on wheelchair  Z99.3    Active

 

 Problem  Fecal incontinence  R15.9    Active

 

 Problem  Foot ulcer  L97.509    Active

 

 Problem  Depression with anxiety  F41.8    Active

 

 Problem  Paraplegia  G82.20    Active

 

 Problem  Constipation  K59.00    Active

 

 Problem  Incontinence of urine  R32    Active

 

 Problem  Nausea alone  R11.0    Active

 

 Assessment  Episodic tension-type headache, not  G44.219    Active



   intractable      

 

 Assessment   (ventriculoperitoneal) shunt  Z98.2    Active



   status      

 

 Assessment  Ulcer of left foot, unspecified  L97.529    Active



   ulcer stage      

 

 Assessment  Nonintractable episodic headache,  R51    Active



   unspecified headache type      

 

 Assessment  Depression with anxiety  F41.8    Active

 

 Problem  Episodic tension-type headache, not  G44.219    Active



   intractable      

 

 Assessment  Lumbar spina bifida with  Q05.2    Active



   hydrocephalus      

 

 Problem  Nonintractable episodic headache,  R51    Active



   unspecified headache type      

 

 Assessment  Nausea and vomiting, intractability  R11.2    Active



   of vomiting not specified,      



   unspecified vomiting type      

 

 Problem   (ventriculoperitoneal) shunt  Z98.2    Active



   status      







Medications







 Medication  Code  Code  Instructions  Start  End  Status  Dosage



   System      Date  Date    

 

 Tylenol with  NDC  09853147518  300-60 MG      Active  1 tablet as



 Codeine #4      Orally every 6        needed



       hrs        

 

 Macrobid  NDC  54530164976  100 MG Orally      Active  1 capsule



       every 12 hrs        with food

 

 Pantoprazole  NDC  09260559727  40 MG Orally      Active  1 tablet



 Sodium      Once a day        

 

 Collagenase  NDC  [address]-94  250 UNIT/GM      Active  1 application



       Externally        to affected



       Once a day        area







Results

No Known Results



Summary Purpose

[employer] Submission

## 2019-01-11 NOTE — XMS REPORT
[facility]

 Created on:2018



Patient:Redd Dale

Sex:Male

:1985

External Reference #:[id number]





Demographics







 Address  [address]  [place] [address]

 

 Phone  [phone]

 

 Preferred Language  en

 

 Marital Status  Unknown

 

 Orthodox Affiliation  Unknown

 

 Race  Black or 

 

 Ethnic Group  Unknown









Author







 Organization  [employer]









Care Team Providers







 Name  Role  Phone

 

 Knox, Na  Provider Role  Unavailable









Allergies, Adverse Reactions, Alerts







 Substance  Reaction  Event Type

 

 Zofran  Info Not Available  Drug Allergy

 

 Morphine Sulfate ER  Info Not Available  Drug Allergy

 

 Levaquin  Info Not Available  Drug Allergy







Problems







 Problem Type  Condition  Code  Onset Dates  Condition Status

 

 Assessment  Mental disorder, not otherwise  F99    Active



   specified      

 

 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



   disorder      

 

 Assessment  Ulcer of left foot, unspecified  L97.529    Active



   ulcer stage      

 

 Problem  Foot ulcer  L97.509    Active

 

 Assessment  Nonintractable episodic headache,  R51    Active



   unspecified headache type      

 

 Problem  Constipation  K59.00    Active

 

 Assessment  Episodic tension-type headache, not  G44.219    Active



   intractable      

 

 Problem  Paraplegia  G82.20    Active

 

 Problem  Incontinence of urine  R32    Active

 

 Problem  Nausea alone  R11.0    Active

 

 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



   disorder      

 

 Problem  Mental disorder, not otherwise  F99    Active



   specified      

 

 Assessment  Bipolar depression  F31.30    Active

 

 Assessment  Lumbar spina bifida with  Q05.2    Active



   hydrocephalus      

 

 Problem  Bipolar depression  F31.30    Active

 

 Assessment   (ventriculoperitoneal) shunt  Z98.2    Active



   status      

 

 Problem  Depression with anxiety  F41.8    Active

 

 Problem  Fecal incontinence  R15.9    Active

 

 Problem  Nausea and vomiting, intractability  R11.2    Active



   of vomiting not specified,      



   unspecified vomiting type      

 

 Problem  Ulcer of left foot, unspecified  L97.529    Active



   ulcer stage      

 

 Problem  Episodic tension-type headache, not  G44.219    Active



   intractable      

 

 Problem  Nonintractable episodic headache,  R51    Active



   unspecified headache type      

 

 Assessment  Depression with anxiety  F41.8    Active

 

 Problem  Dependence on wheelchair  Z99.3    Active

 

 Problem  Lumbar spina bifida with  Q05.2    Active



   hydrocephalus      

 

 Problem   (ventriculoperitoneal) shunt  Z98.2    Active



   status      

 

 Problem  Nicotine dependence  F17.200    Active







Medications







 Medication  Code  Code  Instructions  Start  End  Status  Dosage



   System      Date  Date    

 

 Collagenase  NDC  [address]-06  250 UNIT/GM      Active  1 application



       Externally        to affected



       Once a day        area

 

 Macrobid  NDC  54892158353  100 MG Orally      Active  1 capsule



       every 12 hrs        with food

 

 Tylenol with  NDC  76620672165  300-60 MG      Active  1 tablet as



 Codeine #4      Orally every 6        needed



       hrs        

 

 Citalopram  NDC  71347109595  10 MG Orally  July    Active  1 tablet



 Hydrobromide      Once a day  2018      

 

 Pantoprazole  NDC  20133421260  40 MG Orally      Active  1 tablet



 Sodium      Once a day        

 

 Seroquel  NDC  22078024270  25 MG Orally  July    Active  1 tablet



       Once a day at  16,      



       bedtime        







Results

No Known Results



Summary Purpose

[employer] Submission

## 2019-01-11 NOTE — XMS REPORT
Continuity of Care Document

 Created on:2018



Patient:JORDAN VERDIN JR

Sex:Male

:1985

External Reference #:[id number]





Demographics







 Address  [address] [place] [address] [address] [place] [id number]

 

 Phone  [phone]

 

 Phone  [phone]

 

 Preferred Language  Unknown

 

 Marital Status  Unknown

 

 Worship Affiliation  Unknown

 

 Race  Unknown

 

 Ethnic Group  Unknown









Author







 Organization  [facility]









Problems







 Problem  Status  Onset  Classification  Date  Comments  Source



     Date    Reported    



             



             



             

 

 HEADACHE  Active  20        [address]



             

 

 SHUNT  Active  20        [address]



             

 

 ACUTE HEADACHE  Active  20        [address]



             

 

 Pressure ulcer  Active  20  Finding  2018    [facility]



 of right ankle,    18        [facility]



 stage 3            [facility]



             



             

 

 Nausea &  Active  20  Finding  2018    [facility]



 vomiting    18        [facility]



             [place]



             



             

 

 Nausea and  Active  20  Finding  2018    [facility]



 vomiting    18        [facility]



             [place]



             



             

 

 Decubitus ulcer  Active  20  Finding  2018    [facility]



 of right ankle,    18        [facility]



 stage 3            [place]



             



             

 

 Chronic  Active  20  Finding  2018    [facility]



 indwelling Ortega    17        [facility]



 catheter            [facility]



             



             

 

 Sacral ulcer  Resolved  20  Finding  2018    [facility]



     17        [facility]



             [place]



             



             

 

 Heel ulcer  Active  20  Finding  2018    [facility]



     17        [facility]



             [place]



             



             

 

 Cellulitis  Resolved  20  Finding  2018    [facility]



     17        [facility]



             [place]



             



             

 

 Lymphedema  Active  20  Finding  2018    [facility]



     17        [facility]



             [place]



             



             

 

 Chronic pain  Active  20  Finding  2018    [facility]



 disorder    17        [facility]



             [facility]



             



             

 

 GERD  Active  20  Finding  2018    [facility]



     17        [facility]



             [place]



             



             

 

 Hypertension  Active  20  Finding  2018    [facility]



     17        [facility]



             [place]



             



             

 

 Spina bifida  Active  20  Finding  2018    [facility]



     17        [facility]



             [place]



             



             

 

 Stage II  Resolved  20  Finding  2018    [facility]



 pressure ulcer    16        [facility]



 of buttock            [place]



             



             

 

 Urinary tract  Resolved  07/22/20  Finding  2018    [facility]



 infectious    16        [facility]



 disease            [facility]



             



             

 

 Stage IV  Active  20  Finding  2018    [facility]



 pressure ulcer    16        [facility]



 of heel            [facility]



             



             

 

 Paraplegic  Active  20  Finding  2018    [facility]



 spinal paralysis    16        [facility]



             [place]



             



             

 

 Malaise  Active  10/16/20  Finding  2018    [facility]



     15        [facility]



             [place]



             



             

 

 Wound of left  Resolved  10/16/20  Finding  2018    [facility]



 ankle    15        [facility]



             [place]



             



             

 

 Leukocytosis  Active  10/16/20  Finding  2018    [facility]



     15        [facility]



             [place]



             



             

 

 Rectal  Resolved  10/16/20  Finding  2018    [facility]



 hemorrhage    15        [facility]



             [place]



             



             

 

 Decubitus ulcer,  Resolved  20  Finding  2018    [facility]



 infected    14        [facility]



             [place]



             



             

 

 Stage III  Active    Finding  2018    [facility]



 pressure ulcer            [facility]



             [place]



             



             

 

 Ulcer of scrotum  Resolved    Finding  2018    [facility]



             [facility]



             [place]



             



             

 

 Poor social  Active    Finding  2018    [facility]



 situation            [facility]



             [facility]



             



             

 

 Pressure ulcer  Active    Finding  2018    [facility]



 of thigh, stage            [facility]



 3            [facility]



             



             

 

 Stage III  Resolved    Finding  2018    [facility]



 pressure ulcer            [facility]



 of heel            [place]



             



             

 

 Pressure ulcer,  Inactive    Finding  2018    [facility]



 stage 3            [facility]



             [place]



             



             

 

 Stage II  Resolved    Finding  2018    [facility]



 pressure ulcer            [facility]



 of left ankle            [facility]



             



             

 

 Stage III  Active    Finding  2018    [facility]



 pressure ulcer            [facility]



 of left ankle            [facility]



             



             

 

 Stage II  Resolved    Finding  2018    [facility]



 pressure ulcer            [facility]



 of right ankle            [facility]



             



             

 

 History of  Active    Finding  2018    [facility]



 Clostridium            [facility]



 difficile            [facility]



 colitis            



             

 

 Pressure ulcer  Resolved    Finding  2018    [facility]



 of right leg,            [facility]



 stage 2            [facility]



             



             

 

 Pressure ulcer  Active    Finding  2018    [facility]



 of right leg,            [facility]



 stage 3            [facility]



             



             

 

 Asymptomatic  Active    Finding  2018    [facility]



 bacteriuria            [facility]



             [facility]



             



             

 

 Ulcer of toe  Resolved    Finding  2018    [facility]



             [facility]



             [facility]



             



             

 

 Incontinence  Active    Finding  2018    [facility]



 without sensory            [facility]



 awareness            [facility]



             



             

 

 Unstageable  Resolved    Finding  2018    [facility]



 pressure sore            [facility]



             [facility]



             



             

 

 Abdominal pain  Inactive    Finding  2018    [facility]



             [facility]



             [facility]



             



             

 

 Cellulitis of  Resolved    Finding  2018    [facility]



 heel, left            [facility]



             [facility]



             



             

 

 Clostridium  Resolved    Finding  2018    [facility]



 difficile            [facility]



 colitis            [facility]



             



             

 

 Acute pain  Active    Problem  2018    [facility]



             

 

 Asthma  Resolved    Problem  2018    [facility]



             

 

 Bronchitis  Resolved    Problem  2018    [facility]



             

 

 Cerebral palsy  Resolved    Problem  2018    [facility]



             

 

 Headache  Active    Problem  2018    [facility]



             

 

 Hydrocephalus  Resolved    Problem  2018    [facility]



             

 

 Osteomyelitis  Resolved    Problem  2018    [facility] 



             Pearlmary b[facility] -



             Nick,M



             [facility]



             

 

 HEADACHE  Active          [facility]



             







Medications







 Medication  Details  Route  Status  Patient  Ordering  Order  Source



         Instructions  Provider  Date  



               



               



               

 

 Docusate Sodium  100 mg=1 cap,    Active         [place]



 100 MG Oral  PO, BID, 0            [facility]



 Capsule  Refill(s)            [facility]



               



               

 

 Zosyn  0 Refill(s)    Active        [facility]



               [facility]



               



               

 

 celecoxib 200  200 mg=1 cap,    Active      [facility] [place]



 mg oral capsule  PO, BID, 0          2018  [facility]



   Refill(s)            [facility]



               



               

 

 ascorbic acid  500 mg=1 tab,    Active      [facility] [place]



   PO, BID, 0            [facility]



   Refill(s)            [facility]



               



               

 

 acetaminophen  1,000 mg=2 tab,    Active      [facility] [place]



 500 mg oral  PO, Q6Hnow, 0            [facility]



 tablet  Refill(s)            [facility]



               



               

 

 Oxycodone  5 mg=1 tab, PO,    Active      [facility] [place]



 Hydrochloride 5  Q4H, PRN Pain          2018  [facility]



 MG Oral Tablet  Score 4-6, 0            Center



   Refill(s)            



               



               

 

 zinc sulfate  220 mg=1 cap,    Active      [facility] [place]



 220 mg oral  PO, Daily, 0          2018  [facility]



 capsule  Refill(s)            [facility]



               



               

 

 multivitamin  1 tab, PO,    Active      [facility] [place]



   Daily, 0          2018  [facility]



   Refill(s)            [facility]



               



               

 

 methocarbamol  1,000 mg=2 tab,    Active      [facility] [place]



 500 mg oral  PO, Q8H, 0          2018  [facility]



 tablet  Refill(s)            Center



               



               

 

 LORazepam 0.5  0.5 mg=1 tab,    Active         [place]



 mg oral tablet  PO, Q8H, PRN          2018  [facility]



   Anxiety, 0            Center



   Refill(s)            



               



               

 

 Lidocaine  3 patch, TOP,    Active         [place]



 Hydrochloride  Daily, Remove          2018  [facility]



 0.05 MG/MG  after 12 hours,            Center



 Transdermal  0 Refill(s)            



 Patch              



 [Lidoderm]              



               



               

 

 Robaxin  1,000 mg, 2    No Longer        [facility]



   tab, Route: PO,    Active      2018  [facility]



   Drug form: TAB,            Center



   Q8H, Dosing            



   Weight 127.027,            



   kg, Start date:            



   18            



   16:00:00 CDT,            



   Duration: 30            



   day, Stop date:            



   18            



   8:00:00            



   CDTNotes: (Same            



   as:Robaxin)            



               



               

 

 Oxycodone  10 mg, 2 tab,    No Longer         [place]



 Hydrochloride 5  Route: PO, Drug    Active      2018  [facility]



 MG Oral Tablet  form: TAB,            Center



   Daily, Dosing            



   Weight 127.027,            



   kg, PRN            



   Procedure,            



   Start date:            



   18            



   13:06:00 CDT,            



   Duration: 30            



   day, Stop date:            



   18            



   13:05:00            



   CDTNotes: (Same            



   as: Roxicodone)            



               



               

 

 Ativan  0.5 mg, 1 tab,    No Longer        [facility]



   Route: PO, Drug    Active      2018  [facility]



   form: TAB, Q8H,            Center



   Dosing Weight            



   127.027, kg,            



   PRN Anxiety,            



   Start date:            



   18            



   10:18:00 CDT,            



   Duration: 7            



   day, Stop date:            



   18            



   10:17:00            



   CDTNotes: (Same            



   as: Ativan)            



               



               

 

 Trazodone  50 mg, 1 tab,    No Longer        [facility] [place]



 Hydrochloride  Route: PO, Drug    Active      2018  [facility]



 50 MG Oral  form: TAB,            Center



 Tablet  Bedtime, Dosing            



   Weight 127.027,            



   kg, Start date:            



   18            



   21:00:00 CDT,            



   Duration: 30            



   day, Stop date:            



   18            



   21:00:00            



   CDTNotes: (Same            



   As: Desyrel)            



               



               

 

 remove patch  3 patch, Route:    No Longer         [place]



   TOP, Bedtime,    Active        [facility]



   Drug form:            Center



   ERFILM, Start            



   date: 18            



   21:00:00 CDT,            



   Duration: 30            



   day, Stop date:            



   18            



   21:00:00            



   CDTNotes:            



   Remove patch 12            



   hours after            



   application            



   each day.            



               



               

 

 Oxycodone  10 mg, 2 tab,    Inactive        [facility] [place]



 Hydrochloride 5  Route: PO, Drug          2018  [facility]



 MG Oral Tablet  form: TAB,            Center



   ONCE, Dosing            



   Weight 127.027,            



   kg, Start date:            



   18            



   17:01:00 CDT,            



   Stop date:            



   18            



   17:01:00            



   CDTNotes: (Same            



   as: Roxicodone)            



               



               

 

 Celebrex  200 mg, 1 cap,    No Longer        [facility]



   Route: PO, Drug    Active      2018  [facility]



   form: CAP, BID,            [facility]



   Dosing Weight            



   127.027, kg,            



   Start date:            



   18            



   17:00:00 CDT,            



   Duration: 30            



   day, Stop date:            



   18            



   9:00:00            



   CDTNotes:            



   NSAID. Please            



   check            



   indication. Not            



   for seizure.            



   (Same As:            



   CeleBREX)            



               



               

 

 Vancomycin  1,500 mg, 250    No Longer         [place]



   mL, Route:    Active      2018  [facility]



   IVPB, Drug            Center



   form: INJ,            



   [id number], Dosing            



   Weight 127.27,            



   kg, Start date:            



   18            



   16:00:00 CDT,            



   Stop date:            



   05/10/18            



   8:00:00 CDT,            



   ABX Indication:            



   Skin/Soft            



   Tissue            



   InfectionNotes:            



   TIME CRITICAL            



   MEDICATION Same            



   as: Vancocin-NS            



   (premixed)            



   Infusion rate            



   2001 mg: infuse            



   over 2.5 hours            



               



               

 

 Lidocaine  3 patch, Route:    No Longer        [facility] [place]



 Hydrochloride  TOP, Daily,    Active        [facility]



 0.05 MG/MG  Drug form:            [facility]



 Transdermal  FILM, Start            



 Patch  date: 18            



 [Lidoderm]  9:00:00 CDT,            



   Duration: 7            



   day, Stop date:            



   18            



   9:00:00 CDT,            



   Remove after 12            



   hoursNotes:            



   Apply only once            



   for up to 12            



   hours in a            



   24-hour period            



   (12 hours on            



   and 12 hours            



   off). (Same as:            



   Lidoderm)            



   "Remove old            



   patch before            



   application of            



   new patch"            



               



               

 

 Phenergan  12.5 mg, 0.5    Inactive         [place]



   mL, Route:          2018  [facility]



   IVPB, Drug            Center



   form: INJ,            



   ONCE, Dosing            



   Weight 127.027,            



   kg, Priority:            



   NOW, Start            



   date: 18            



   17:40:00 CDT,            



   Stop date:            



   18            



   17:40:00            



   CDTNotes: Do            



   not give IV            



   push.  (Same            



   as: Phenergan)            



               



               

 

 Dilaudid  0.5 mg, 0.25    Inactive       [place]



   mL, Route: IVP,            Medical



   Drug form: INJ,            Center



   ONCE, Dosing            



   Weight 127.027,            



   kg, Priority:            



   NOW, Start            



   date: 18            



   17:40:00 CDT,            



   Stop date:            



   18            



   17:40:00            



   CDTNotes: Same            



   as Dilaudid            



               



               

 

 Tramadol  100 mg, 2 tab,    No Longer        [facility]



   Route: PO, Drug    Active        [facility]



   form: TAB,            Center



   Q6Hnow, Dosing            



   Weight 127.027,            



   kg, Start date:            



   18            



   17:00:00 CDT,            



   Duration: 30            



   day, Stop date:            



   18            



   11:00:00            



   CDTNotes: Not            



   to exceed            



   400mg/day.            



   (Same As:            



   Ultram)            



               

 

 gabapentin  600 mg, 2 cap,    No Longer        [facility]



   Route: PO, Drug    Active        [facility]



   form: CAP,            Center



   Q8Hnow, Dosing            



   Weight 127.027,            



   kg, Start date:            



   18            



   17:00:00 CDT,            



   Stop date:            



   18            



   9:00:00            



   CDTNotes: (Same            



   as: Neurontin)            



               



               

 

 Acetaminophen  1,000 mg, 2    No Longer        [facility]



   tab, Route: PO,    Active        [facility]



   Drug form: TAB,            Center



   Q6Hnow, Dosing            



   Weight 127.027,            



   kg, Start date:            



   18            



   17:00:00 CDT,            



   Duration: 30            



   day, Stop date:            



   18            



   11:00:00            



   CDTNotes: Max            



   acetaminophen            



   4000 mg/day (4            



   gm/day).  (Same            



   as: Tylenol            



   Extra Strength)            



               



               

 

 Robaxin  500 mg, 1 tab,    No Longer        [facility]



   Route: PO, Drug    Active        [facility]



   form: TAB, TID,            Center



   Dosing Weight            



   127.027, kg,            



   Start date:            



   18            



   17:00:00 CDT,            



   Duration: 30            



   day, Stop date:            



   18            



   13:00:00            



   CDTNotes: (Same            



   as:Robaxin)            



               



               

 

 Oxycodone  5 mg, 1 tab,    No Longer        [facility] [place]



 Hydrochloride 5  Route: PO, Drug    Active      2018  Medical



 MG Oral Tablet  form: TAB, Q4H,            Center



   Dosing Weight            



   127.027, kg,            



   PRN Pain Score            



   4-6, Start            



   date: 18            



   16:33:00 CDT,            



   Duration: 30            



   day, Stop date:            



   18            



   16:32:00            



   CDTNotes: (Same            



   as: Roxicodone)            



               



               

 

 Beneprotein 7  2 pkt, Route:    No Longer         [place]



 gm pkt  PO, Drug Form:    Active      2018  Medical



   PWDR, Dosing            Center



   Weight 127.027,            



   kg, BID-Before            



   Meals, Start            



   date: 18            



   16:30:00 CDT,            



   Duration: 30            



   day, Stop date:            



   18            



   7:30:00            



   CDTNotes: (Same            



   as:            



   Beneprotein)            



               



               

 

 Acetaminophen  1 tab, PO, TID,    No Longer         [place]



 325 MG /  0 Refill(s)    Active      2018  Medical



 Oxycodone              Center



 Hydrochloride              



 10 MG Oral              



 Tablet              



 [Percocet              



 10/325]              



               

 

 Dilaudid  0.5 mg, 0.25    Inactive       [place]



   mL, Route: IVP,          2018  Medical



   Drug form: INJ,            Center



   ONCE, Start            



   date: 18            



   11:01:00 CDT,            



   Stop date:            



   18            



   11:01:00 CDT            



               



               

 

 Phenergan  25 mg, 1 tab,    Inactive        [facility]



   Route: PO, Drug          2018  Medical



   form: TAB, Q6H,            Center



   Dosing Weight            



   127.027, kg,            



   PRN Nausea &            



   Vomiting, Start            



   date: 18            



   10:43:00 CDT,            



   Duration: 30            



   day, Stop date:            



   18            



   10:42:00            



   CDTNotes: (Same            



   as: Phenergan)            



               



               

 

 Dilaudid  2 mg, Route:    Inactive         [place]



   IVP, ONCE,          2018  Medical



   Dosing Weight            Center



   127.027, kg,            



   Priority: STAT,            



   Start date:            



   18            



   10:43:00 CDT,            



   Stop date:            



   18            



   10:43:00 CDT            



               



               

 

 Docusate  100 mg, 1 cap,    No Longer        [facility]



   Route: PO, Drug    Active      2018  [facility]



   form: CAP, BID,            Center



   Dosing Weight            



   127.27, kg,            



   Start date:            



   18            



   9:00:00 CDT,            



   Duration: 30            



   day, Stop date:            



   18            



   17:00:00            



   CDTNotes: (Same            



   as: Colace) (Do            



   Not Crush)            



               



               

 

 Zinc Sulfate  220 mg, 1 cap,    No Longer       [place]



   Route: PO, Drug    Active      2018  [facility]



   form: CAP,            Center



   Daily, Dosing            



   Weight 127.27,            



   kg, Start date:            



   18            



   9:00:00 CDT,            



   Duration: 30            



   day, Stop date:            



   18            



   9:00:00            



   CDTNotes: (Zinc            



   sulfate            



   capsule) - 220            



   mg Zinc            



   sulfate=50 mg            



   elemental zinc            



   Same as Zinc            



   Sulfate            



               

 

 ascorbic acid  500 mg, 1 tab,    No Longer       [place]



   Route: PO, Drug    Active        [facility]



   form: TAB, BID,            Center



   Dosing Weight            



   127.27, kg,            



   Start date:            



   18            



   9:00:00 CDT,            



   Duration: 30            



   day, Stop date:            



   18            



   17:00:00            



   CDTNotes: (Same            



   as: Vitamin C)            



               



               

 

 multivitamin  1 tab, Route:    No Longer       [place]



   PO, Drug Form:    Active      2018  [facility]



   TAB, Dosing            Center



   Weight 127.27,            



   kg, Daily,            



   Start date:            



   18            



   9:00:00 CDT,            



   Duration: 30            



   day, Stop date:            



   18            



   9:00:00            



   CDTNotes: (Same            



   as:Thera)            



   WASTE: F/P -            



   Black; E -            



   Municipal Trash            



   Bin  Take with            



   food.            



               

 

 Naproxen  500 mg, 1 tab,    Inactive       [place]



   Route: PO, Drug            Medical



   form: TAB,            Center



   [id number], Dosing            



   Weight 127.27,            



   kg, Start date:            



   18            



   2:00:00 CDT,            



   Duration: 30            



   day, Stop date:            



   18            



   14:00:00            



   CDTNotes: (Same            



   as: Naprosyn)            



   Take with food.            



               



               

 

 Zosyn  3.375 gm,    No Longer        [facility]



   Route: IVPB,    Active      2018  Medical



   Drug form:            Center



   PDR/INJ,            



   [id number], Dosing            



   Weight 127.27,            



   kg, Start date:            



   18            



   2:00:00 CDT,            



   Stop date:            



   05/10/18            



   10:00:00 CDT,            



   ABX Indication:            



   Skin/Soft            



   Tissue            



   InfectionNotes:            



   (Same as:            



   Zosyn) Dosing            



   based on            



   Piperacillin            



   component   ***            



   MEDICATION            



   WASTE ***            



   Product Size:            



   3375 mg Product            



   Wasted:  ___ mg            



               



               

 

 Vancomycin  1,000 mg,    No Longer         [place]



   Route: IVPB,    Active        Medical



   Drug form: INJ,            Center



   [id number], Dosing            



   Weight 127.27,            



   kg, Start date:            



   18            



   2:00:00 CDT,            



   Duration: 7            



   day, Stop date:            



   05/10/18            



   18:00:00 CDT,            



   ABX Indication:            



   Skin/Soft            



   Tissue            



   InfectionNotes:            



   TIME CRITICAL            



   MEDICATION            



   (Same As:            



   Vancocin)            



   Infusion rate            



   2001 mg: infuse            



   over 2.5 hours            



   For adult            



   patients only:            



   Round to            



   nearest 250 mg            



   per Medical            



   Staff approval            



    *** MEDICATION            



   WASTE ***            



   Product Size:            



   1000 mg Product            



   Wasted:  ___ mg            



               



               

 

 Enoxaparin  40 mg, 0.4 mL,    No Longer         [place]



   Route: SUB-Q,    Active        Medical



   Drug form: INJ,            Center



   [id number],            



   Dosing Weight            



   127.27, kg,            



   Consider for            



   obese patients,            



   Start date:            



   18            



   2:00:00 CDT,            



   Stop date:            



   18            



   14:00:00            



   CDTNotes: (Same            



   as: Lovenox)            



               



               

 

 Sodium Chloride  1,000 mL, Rate:    No Longer       [place]



 0.9% IV 1,000  125 ml/hr,    Active        Medical



 mL  Infuse over: 8            Center



   hr, Route: IV,            



   Dosing Weight            



   127.27 kg,            



   Total Volume:            



   1,000, Start            



   date: 18            



   1:46:00 CDT,            



   Duration: 30            



   day, Stop date:            



   18            



   1:45:00 CDT,            



   2.44, m2            



               

 

 Saline Flush  10 ml, Route:    No Longer         [place]



 0.9%  IVP, Drug Form:    Active        Medical



   INJ, Dosing            Center



   Weight 127.27,            



   kg, PRN, PRN            



   Line Flush,            



   Start date:            



   18            



   1:46:00 CDT,            



   Duration: 30            



   day, Stop date:            



   18            



   1:45:00            



   CDTNotes: (Same            



   as: BD            



   Posiflush)            



               



               

 

 Acetaminophen  325 mg, 1 tab,    Inactive         [place]



   Route: PO, Drug          2018  Medical



   form: TAB, Q4H,            Center



   Dosing Weight            



   127.27, kg, PRN            



   Pain Score 4-6,            



   Start date:            



   18            



   1:46:00 CDT,            



   Duration: 30            



   day, Stop date:            



   18            



   1:45:00            



   CDTNotes: Do            



   not exceed 4            



   gm/day.  (Same            



   as: Tylenol)            



               



               

 

 Acetaminophen  1 tab, Route:    Inactive         [place]



 325 MG /  PO, Drug Form:          2018  Medical



 Hydrocodone  TAB, Dosing            Center



 Bitartrate 5 MG  Weight 127.27,            



 Oral Tablet  kg, Q4H, PRN            



   Pain Score 4-6,            



   Start date:            



   18            



   1:46:00 CDT,            



   Duration: 30            



   day, Stop date:            



   18            



   1:45:00            



   CDTNotes: (Same            



   as: Norco            



   325/5)  Do not            



   exceed 4gm/day            



   of            



   acetaminophen.            



               



               

 

 Reglan  10 mg, 2 mL,    Inactive         [place]



   Route: IVP,            Medical



   Drug form: INJ,            Center



   ONCE, Dosing            



   Weight 127.273,            



   kg, Priority:            



   STAT, Start            



   date: 18            



   23:27:00 CDT,            



   Stop date:            



   18            



   23:27:00            



   CDTNotes: (Same            



   as: Reglan)            



               



               

 

 Benadryl  25 mg, 0.5 mL,    Inactive         [place]



   Route: IVP,          2018  Medical



   Drug form: INJ,            Center



   ONCE, Dosing            



   Weight 127.273,            



   kg, Priority:            



   STAT, Start            



   date: 18            



   23:27:00 CDT,            



   Stop date:            



   18            



   23:27:00            



   CDTNotes: (Same            



   as: Benadryl)            



               



               

 

 Magnesium  2 gm, 50 mL,    Inactive         [place]



 Sulfate  Route: IV, Drug            Medical



   form: INJ,            Center



   ONCE, Dosing            



   Weight 127.273,            



   kg, Priority:            



   STAT, Start            



   date: 18            



   23:26:00 CDT,            



   Stop date:            



   18            



   23:26:00            



   CDTNotes:            



   WASTE: F/P -            



   Sink; E -            



   Municipal Trash            



   Bin            



               

 

 Sodium Chloride  1,000 mL, 1000    Inactive        [facility] [place]



 0.9% (Bolus) IV  ml/hr, Infuse          2018  Medical



   Over: 1 hr,            Center



   Route: IV,            



   1,000, Drug            



   form: INJ,            



   ONCE, Priority:            



   STAT, Dosing            



   Weight 127.273            



   kg, Start date:            



   18            



   23:26:00 CDT,            



   Stop date:            



   18            



   23:26:00 CDT            



               



               

 

 Zosyn  4.5 gm, Route:    Inactive         [place]



   IVPB, ONCE,          2018  Medical



   Dosing Weight            Center



   127.273, kg,            



   Priority: STAT,            



   Start date:            



   18            



   23:10:00 CDT,            



   Stop date:            



   18            



   23:10:00 CDT,            



   ABX Indication:            



   Bacteremia            



               



               

 

 Vancomycin  2,000 mg,    Inactive         [place]



   Route: IVPB,          2018  Medical



   ONCE, Dosing            Center



   Weight 127.273,            



   kg, Priority:            



   STAT, Start            



   date: 18            



   23:10:00 CDT,            



   Stop date:            



   18            



   23:10:00 CDT,            



   ABX Indication:            



   BacteremiaNotes            



   : TIME CRITICAL            



   MEDICATION            



   (Same As:            



   Vancocin)            



   Infusion rate            



   2001 mg: infuse            



   over 2.5 hours            



   For adult            



   patients only:            



   Round to            



   nearest 250 mg            



   per Medical            



   Staff approval            



    *** MEDICATION            



   WASTE ***            



   Product Size:            



   1000 mg Product            



   Wasted:  ___ mg            



               



               

 

 normal saline  1,000 mL, Rate:    No Longer       [place]



 0.9% IV 1,000  75 ml/hr,    Active      2018  Medical



 mL  Infuse over:            Center



   13.3 hr, Route:            



   IV, Dosing            



   Weight 127.273            



   kg, Total            



   Volume: 1,000,            



   Start date:            



   18            



   22:39:00 CDT,            



   Duration: 30            



   day, Stop date:            



   18            



   22:38:00 CDT,            



   2.36, m2            



               

 

 Acetaminophen  1,000 mg, 2    Inactive         [place]



   tab, Route: PO,          2018  Medical



   Drug form: TAB,            Center



   ONCE, Dosing            



   Weight 127.273,            



   kg, Start date:            



   18            



   21:56:00 CDT,            



   Stop date:            



   18            



   21:56:00            



   CDTNotes: Max            



   acetaminophen            



   4000 mg/day (4            



   gm/day).  (Same            



   as: Tylenol            



   Extra Strength)            



               



               

 

 Rocephin  1 gm, Route:    Inactive      05/04[facility]



   IVP, Drug form:          2018  Medical



   PDR/INJ, ONCE,            Center



   Dosing Weight            



   127.273, kg,            



   Priority: STAT,            



   Start date:            



   18            



   21:21:00 CDT,            



   Stop date:            



   18            



   21:21:00 CDT,            



   ABX Indication:            



   Urinary Tract            



   InfectionNotes:            



   (Same As:            



   Rocephin).            



   *** MEDICATION            



   WASTE ***            



   Product Size:            



   1000 mg Product            



   Wasted:  _0__            



   mg            



               

 

 Morphine  4 mg, Route:    Inactive        [facility] [place]



   IVP, ONCE,          2018  Medical



   Dosing Weight            Center



   127.273, kg,            



   Priority: STAT,            



   Start date:            



   18            



   19:05:00 CDT,            



   Stop date:            



   18            



   19:05:00 CDT            



               



               

 

 Benadryl  25 mg, 0.5 mL,    Inactive        [facility] [place]



   Route: IVP,            Medical



   Drug form: INJ,            Center



   ONCE, Dosing            



   Weight 127.273,            



   kg, Priority:            



   STAT, Start            



   date: 18            



   18:14:00 CDT,            



   Stop date:            



   18            



   18:14:00            



   CDTNotes: (Same            



   as: Benadryl)            



               



               

 

 Benadryl  50 mg, 2 cap,    Inactive      [facility]



   Route: PO, Drug            Medical



   form: CAP,            Center



   ONCE, Dosing            



   Weight 127.273,            



   kg, Priority:            



   STAT, Start            



   date: 18            



   17:56:00 CDT,            



   Stop date:            



   18            



   17:56:00            



   CDTNotes: (Same            



   as: Benadryl)            



               



               

 

 Morphine  4 mg, 1 mL,    Inactive      [facility]



   Route: IVP,            Medical



   Drug form:            Center



   SOLN, ONCE,            



   Dosing Weight            



   127.273, kg,            



   Priority: STAT,            



   Start date:            



   18            



   17:56:00 CDT,            



   Stop date:            



   18            



   17:56:00 CDT            



               



               

 

 Pantoprazole  DAILY AT 0630    Active    [facility]



             2016  [facility]



               [place]



               



               

 

 Metronidazole  Q8H    Active    [facility]



             2016  [facility]



               [place]



               



               

 

 Codeine/Apap  EVERY 6 HOURS    Active    [facility]



   AS NEEDED PRN            [facility]



   For [facility]            [place]



               



               

 

 Oxycodone  FOUR TIMES    Active        [facility]



 Hcl/Acetaminoph  DAILY          2016  [facility]



 [facility]              [place]



               



               

 

 Meropenem  Q8H    Active        [facility]



               [facility]



               [place]



               



               

 

 Collagenase  DAILY    Active    Granda    [facility]



               [facility]



               [place]



               



               

 

 Ciprofloxacin  Q12H    Active    Todd  09/10/  [facility]



 400mg Iv              [facility]



               [place]



               



               

 

 Amikacin Sulf  DAILY    Active    Todd  09/10/  [facility]



               [facility]



               [place]



               



               

 

 Linezolid  TWICE DAILY    Inactive    Granda    [facility]



               [facility]



               [place]



               



               







Allergies, Adverse Reactions, Alerts







 Substance  Category  Reaction  Severity  Reaction  Status  Date  Comments  
Source



         type    Reported    



                 



                 



                 

 

 levofloxacin    Hives  Moderate  Allergy to  Active      [facility]



         Substance    8    [facility]



                 t



                 



                 

 

 sulfamethoxa    Hives  Moderate  Allergy to  Active      [facility]



 zole        Substance    8    [facility]



                 t



                 



                 

 

 ketorolac    Nausea/Vo    Allergy to  Active      [facility]



 tromethamine    miting    Substance    8    [facility]



                 t



                 



                 

 

 vancomycin    Hives    Allergy to  Active      [facility]



         Substance    8    [facility]



                 [facility]



                 



                 

 

 ondansetron    Nausea/Vo  Mild  Propensity  Active      [facility]



     miting    to adverse    8    [facility]



         [facility]



                 [facility]



                 



                 

 

 ciprofloxaci    Nausea/Vo    Propensity  Active      [facility]



 n    miting    to adverse    8    [facility]



         [facility]        [place]



                 [facility]



                 



                 

 

 doxycycline    Nausea/Vo    Propensity  Active      [facility]



     miting    to adverse    8    [facility]



         [facility]        [place]



                 [facility]



                 



                 

 

 morphine  Assertion      Drug  Active      [facility]



                 



                 

 

 amoxicillin  Assertion      Drug  Active      [facility]



                 



                 

 

 Toradol  Assertion      Drug  Active      [facility]



                 



                 

 

 Minocin  Assertion      Drug  Active      [facility]



                 



                 

 

 Zofran  Assertion      Drug  Active      [facility]



                 



                 

 

 Levaquin  Assertion      Drug  Active      [facility]



                 



                 

 

 Bactrim  Assertion      Drug  Active      [facility]



                 



                 







Immunizations







 Immunization  Date Given  Site  Status  Last Updated  Comments  Source



             



             







Results







 Order Name  Results  Value  Reference  Date  Interpretation  Comments  Source



       [facility]        



               



               



               

 

 Brain-Outs  Brain-Outsid  EXAM: CT BRAIN WITHOUT CONTRAST -- OUTSIDE CONSULT  
      -  [facility] [place]



 jonathan  e Consult     -  Medical



 Consult CT            This report was dictated by a Radiology Resident/Fellow.
  I have personally reviewed the images as  Center



             well as the Resident's interpretation and agree with the findings.
  



     DATE: 2018 4:49 AM CST        Read by:  Mauricio Cortes MD  
        Resident:  Mauricio Cortes MD  



             Dictated Date/time:  18 04:58  



             Electronically Signed by:  Radha Addison MD              
   18 07:46  



             FINAL REPORT  



     INDICATION: " - PAIN"          



               



               



               



     COMPARISON: Noncontrast head CTs 2018, 2015, 2013       
   



               



               



               



     TECHNIQUE: Noncontrast outside hospital CT submitted for 2nd 
interpretation. 205 images. Imaging was performed at [facility] on 2018.          



               



     IV contrast: None.          



               



               



               



     FINDINGS:          



               



               



               



     Overall unchanged exam. Right transfrontal ventriculostomy catheter is 
unchanged in position. 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 
size and configuration. The abandoned right transparie          



     hudson catheter is unchanged. Stigmata of Chiari II malformation.          



               



               



               



     There is no intracranial hemorrhage or extra-axial collection.          



               



               



               



     No new parenchymal density abnormality. No mass or mass effect. Unchanged 
from the tonsillar herniation.          



               



               



               



     The density of the larger intracranial sinuses is normal.          



               



               



               



               



               



     IMPRESSION: Unchanged examination compared to 2018          



               



               



               



     The final report agrees with the preliminary report by the radiology 
resident.          



               



               



               



               

 

 CHEM PANEL  B/C Ratio  17  6 - 25        [address]



               



               



               

 

 CHEM PANEL  Globulin  4.3 g/dL  2.7 - 4.2        [address]



               



               



               

 

 CHEM PANEL  A/G Ratio  0.7  0.7 - 1.6        [address]



               



               



               

 

 CHEM PANEL  AGAP  14.4 meq/L  10.0 -        [facility]



       20.0        [facility]



               



               



               

 

 CHEM PANEL  eGFR  113        Result Comment: The eGFR is calculated using 
the CKD-EPI formula. In most young, healthy individuals the eGFR will be >90 mL/
min/1.73m2. The eGFR declines with age. An eGFR of 60-89 may be normal in  [facility] 
[place]



     mL/min/1.7        some populations, particularly the elderly, for 
whom the CKD-EPI formula has not been extensively validated. Use of the eGFR is 
not recommended in the following populations:  [address]



             Individuals with unstable creatinine concentrations, including 
pregnant patients and those with serious co-morbid conditions.  



               



             Patients with extremes in muscle mass or diet.  



               



             The data above are obtained from the National Kidney Disease 
Education Program (NKDEP) which additionally recommends that when the eGFR is 
used in patients with extremes of body mass index for purposes  



             of drug dosing, the eGFR should be multiplied by the estimated 
BMI.  

 

 CHEM PANEL  Alk Phos  76 unit/L  39 - 136  05/      [address]



               



               



               

 

 CHEM PANEL  ALT  35 unit/L  0 - 65  /      [address]



               



               



               

 

 CHEM PANEL  Albumin Lvl  2.8 g/dL  3.5 - 5.0  /      [address]



               



               



               

 

 CHEM PANEL  Total  7.1 g/dL  6.4 - 8.4        [facility] [place]



   Protein      [address]



               



               



               

 

 CHEM PANEL  Calcium Lvl  8.7 mg/dL  8.5 - 10.5        [address]



               



               



               

 

 CHEM PANEL  AST  18 unit/L  0 - 37  /      [address]



               



               



               

 

 CHEM PANEL  Bili Total  0.3 mg/dL  0.2 - 1.3        [address]



               



               



               

 

 CHEM PANEL  Potassium  4.4 meq/L  3.5 - 5.1        [facility]      [address] [place] [address]



               



               



               

 

 CHEM PANEL  Chloride Lvl  109 meq/L  95 - 109        [address]



               



               



               

 

 CHEM PANEL  CO2  23 meq/L  24 - 32  /      [address]



               



               



               

 

 CHEM PANEL  Glucose Lvl  114 mg/dL  70 - 99        [address]



               



               



               

 

 CHEM PANEL  Creatinine  1.01 mg/dL  0.50 -        [facility]    1.40  /[address] [place] [address]



               



               



               

 

 CHEM PANEL  BUN  17 mg/dL  7 - 22        [address]



               



               



               

 

 CHEM PANEL  Sodium Lvl  142 meq/L  135 - 145        [address]



               



               



               

 

 HEMATOLOGY  Basophils  0.6 %  0.0 - 1.0  /      [address]



               



               



               

 

 HEMATOLOGY  Segs-Bands #  4.8 K/CMM  1.5 - 8.1        [address]



               



               



               

 

 HEMATOLOGY  Monocytes #  0.7 K/CMM  0.0 - 0.8  /      [address]



               



               



               

 

 HEMATOLOGY  Lymphocytes  1.9 K/CMM  1.0 - 5.5  /      [address]



               



               



               

 

 HEMATOLOGY  Monocytes  9.4 %  2.0 - 12.0  /      [address]



               



               



               

 

 HEMATOLOGY  Eosinophils  0.2 K/CMM  0.0 - 0.5        [facility]



   #      /      [facility]



               



               



               

 

 HEMATOLOGY  Eosinophils  2.9 %  0.0 - 4.0         [place]



         /2018      [facility]



               



               



               

 

 HEMATOLOGY  Segs  62.2 %  45.0 -         [place]



       75.0        [facility]



               



               



               

 

 HEMATOLOGY  Lymphocytes  24.9 %  20.0 -         [place]



       40.0        [facility]



               



               



               

 

 HEMATOLOGY  MCH  27.5 pg  27.0 -         [place]



       31.0        [facility]



               



               



               

 

 HEMATOLOGY  MCV  85.3 fL  80.0 -         [place]



       94.0        [facility]



               



               



               

 

 HEMATOLOGY  Hct  43.9 %  42.0 -         [place]



       54.0        [facility]



               



               



               

 

 HEMATOLOGY  Hgb  14.2 g/dL  14.0 -         [place]



       18.0        [facility]



               



               



               

 

 HEMATOLOGY  WBC  7.7 K/CMM  3.7 - 10.4         [place]



         /2018      [facility]



               



               



               

 

 HEMATOLOGY  RBC  5.15 M/CMM  4.70 -         [place]



       6.10        [facility]



               



               



               

 

 HEMATOLOGY  MPV  8.4 fL  7.4 - 10.4         [place]



         /2018      [facility]



               



               



               

 

 HEMATOLOGY  MCHC  32.3 g/dL  32.0 -         [place]



       36.0        [facility]



               



               



               

 

 HEMATOLOGY  RDW  17.3 %  11.5 -         [place]



       14.5        [facility]



               



               



               

 

 HEMATOLOGY  Platelet  317 K/CMM  133 - 450         [place]



         [address]



               



               



               

 

 CHEM PANEL  Globulin  4.4 g/dL  2.7 - 4.2         [place]



               [facility]



               



               



               

 

 CHEM PANEL  A/G Ratio  0.6  0.7 - 1.6         [place]



         /2018      [facility]



               



               



               

 

 CHEM PANEL  B/C Ratio  17  6 - 25         [place]



         /2018      [facility]



               



               



               

 

 CHEM PANEL  AGAP  11.3 meq/L  10.0 -         [place]



       20.0        [facility]



               



               



               

 

 CHEM PANEL  eGFR  134        Result Comment: The eGFR is calculated using 
the CKD-EPI formula. In most young, healthy individuals the eGFR will be >90 mL/
min/1.73m2. The eGFR declines with age. An eGFR of 60-89 may be normal in  [facility] 
[place]



     mL/min/1.7        some populations, particularly the elderly, for 
whom the CKD-EPI formula has not been extensively validated. Use of the eGFR is 
not recommended in the following populations:  [address]



             Individuals with unstable creatinine concentrations, including 
pregnant patients and those with serious co-morbid conditions.  



               



             Patients with extremes in muscle mass or diet.  



               



             The data above are obtained from the National Kidney Disease 
Education Program (NKDEP) which additionally recommends that when the eGFR is 
used in patients with extremes of body mass index for purposes  



             of drug dosing, the eGFR should be multiplied by the estimated 
BMI.  

 

 CHEM PANEL  Creatinine  0.84 mg/dL  0.50 -        [facility]



   Lvl    1.40        [facility]



               



               



               

 

 CHEM PANEL  Sodium Lvl  142 meq/L  135 - 145        [address]



               



               



               

 

 CHEM PANEL  Glucose Lvl  99 mg/dL  70 - 99        [address]



               



               



               

 

 CHEM PANEL  BUN  14 mg/dL  7 - 22        [address]



               



               



               

 

 CHEM PANEL  Alk Phos  79 unit/L  39 - 136        [address]



               



               



               

 

 CHEM PANEL  Bili Total  0.3 mg/dL  0.2 - 1.3        [address]



               



               



               

 

 CHEM PANEL  AST  14 unit/L  0 - 37        [address]



               



               



               

 

 CHEM PANEL  ALT  43 unit/L  0 - 65        [address]



               



               



               

 

 CHEM PANEL  Total  7.1 g/dL  6.4 - 8.4        [facility]



   Protein      [address]



               



               



               

 

 CHEM PANEL  Albumin Lvl  2.7 g/dL  3.5 - 5.0        [address]



               



               



               

 

 CHEM PANEL  Calcium Lvl  9.2 mg/dL  8.5 - 10.5        [address]



               



               



               

 

 CHEM PANEL  CO2  21 meq/L  24 - 32        [address]



               



               



               

 

 CHEM PANEL  Potassium  4.3 meq/L  3.5 - 5.1        [facility]



   Lvl      [address] [place] [address]



               



               



               

 

 CHEM PANEL  Chloride Lvl  114 meq/L  95 - 109        [address]



               



               



               

 

 HEMATOLOGY  MCHC  32.5 g/dL  32.0 -        [facility]



       36.0        [facility]



               



               



               

 

 HEMATOLOGY  RDW  17.5 %  11.5 -        [facility]



       14.5        [facility]



               



               



               

 

 HEMATOLOGY  Platelet  400 K/CMM  133 - 450        [address]



               



               



               

 

 HEMATOLOGY  MPV  8.5 fL  7.4 - 10.4        [facility] [place]



         /[address] [place] [address]



               



               



               

 

 HEMATOLOGY  WBC  6.6 K/CMM  3.7 - 10.4         [place]



         /[address] [place] [address]



               



               



               

 

 HEMATOLOGY  RBC  5.17 M/CMM  4.70 -        [facility]



       6.10        [facility]



               



               



               

 

 HEMATOLOGY  MCV  86.1 fL  80.0 -        [facility]



       94.0        [facility]



               



               



               

 

 HEMATOLOGY  Hct  44.5 %  42.0 -        [facility]



       54.0        [facility]



               



               



               

 

 HEMATOLOGY  MCH  28.0 pg  27.0 -        [facility]



       31.0        [facility]



               



               



               

 

 HEMATOLOGY  Hgb  14.5 g/dL  14.0 -        [facility]



       18.0        [facility]



               



               



               

 

 HEMATOLOGY  Lymphocytes  1.7 K/CMM  1.0 - 5.5        [facility]            [facility]



               



               



               

 

 HEMATOLOGY  Monocytes #  0.7 K/CMM  0.0 - 0.8        [address]



               



               



               

 

 HEMATOLOGY  Eosinophils  0.2 K/CMM  0.0 - 0.5        [facility]            [facility]



               



               



               

 

 HEMATOLOGY  Lymphocytes  26.1 %  20.0 -        [facility]



       40.0        [facility]



               



               



               

 

 HEMATOLOGY  Segs  59.3 %  45.0 -        [facility]



       75.0        [facility]



               



               



               

 

 HEMATOLOGY  Basophils  0.8 %  0.0 - 1.0        [address]



               



               



               

 

 HEMATOLOGY  Monocytes  10.5 %  2.0 - 12.0        [address]



               



               



               

 

 HEMATOLOGY  Eosinophils  3.3 %  0.0 - 4.0        [address]



               



               



               

 

 HEMATOLOGY  Segs-Bands #  3.9 K/CMM  1.5 - 8.1        [address]



               



               



               

 

 TOXICOLOGY  Vanco Tr TND  15:30pm          [address]



               



               



               

 

 TOXICOLOGY  Vanco Tr  9.1 ug/ml          [address]



               



               



               

 

 CHEM PANEL  Glucose Lvl  74 mg/dL  70 - 99        [address]



               



               



               

 

 CHEM PANEL  BUN  12 mg/dL  7 - 22        [address]



               



               



               

 

 CHEM PANEL  Creatinine  0.75 mg/dL  0.50 -        [facility]



   Lvl    1.40        [facility]



               



               



               

 

 CHEM PANEL  eGFR  140        Result Comment: The eGFR is calculated using 
the CKD-EPI formula. In most young, healthy individuals the eGFR will be >90 mL/
min/1.73m2. The eGFR declines with age. An eGFR of 60-89 may be normal in  [facility] 
[place]



     mL/min/1.    some populations, particularly the elderly, for 
whom the CKD-EPI formula has not been extensively validated. Use of the eGFR is 
not recommended in the following populations:  [address]



             Individuals with unstable creatinine concentrations, including 
pregnant patients and those with serious co-morbid conditions.  



               



             Patients with extremes in muscle mass or diet.  



               



             The data above are obtained from the National Kidney Disease 
Education Program (NKDEP) which additionally recommends that when the eGFR is 
used in patients with extremes of body mass index for purposes  



             of drug dosing, the eGFR should be multiplied by the estimated 
BMI.  

 

 CHEM PANEL  Albumin Lvl  2.9 g/dL  3.5 - 5.0        [address]



               



               



               

 

 CHEM PANEL  Globulin  4.4 g/dL  2.7 - 4.2        [address]



               



               



               

 

 CHEM PANEL  A/G Ratio  0.7  0.7 - 1.6        [address]



               



               



               

 

 CHEM PANEL  Bili Total  0.4 mg/dL  0.2 - 1.3        [address]



               



               



               

 

 CHEM PANEL  Alk Phos  71 unit/L  39 - 136        [address]



               



               



               

 

 CHEM PANEL  AST  15 unit/L  0 - 37        [address]



               



               



               

 

 CHEM PANEL  ALT  28 unit/L  0 - 65        [address]



               



               



               

 

 CHEM PANEL  Potassium  4.4 meq/L  3.5 - 5.1        [facility]      [address] [place] [address]



               



               



               

 

 CHEM PANEL  Sodium Lvl  147 meq/L  135 - 145        [address]



               



               



               

 

 CHEM PANEL  CO2  25 meq/L  24 - 32        [address]



               



               



               

 

 CHEM PANEL  Chloride Lvl  114 meq/L  95 - 109  /      [address]



               



               



               

 

 CHEM PANEL  B/C Ratio  16  6 - 25        [address]



               



               



               

 

 CHEM PANEL  Calcium Lvl  8.7 mg/dL  8.5 - 10.5        [address]



               



               



               

 

 CHEM PANEL  AGAP  12.4 meq/L  10.0 -  05/      [facility]



       20.0        [facility]



               



               



               

 

 CHEM PANEL  Total  7.3 g/dL  6.4 - 8.4        [facility] [place]



   Protein      [address]



               



               



               

 

 HEMATOLOGY  Platelet  345 K/CMM  133 - 450  05       [place]



         /2018      [facility]



               



               



               

 

 HEMATOLOGY  MPV  8.7 fL  7.4 - 10.4         [place]



         /2018      [facility]



               



               



               

 

 HEMATOLOGY  WBC  6.9 K/CMM  3.7 - 10.4         [place]



         /2018      [facility]



               



               



               

 

 HEMATOLOGY  RBC  5.30 M/CMM  4.70 -         [place]



       6.10        [facility]



               



               



               

 

 HEMATOLOGY  MCHC  32.8 g/dL  32.0 -         [place]



       36.0        [facility]



               



               



               

 

 HEMATOLOGY  MCH  27.9 pg  27.0 -         [place]



       31.0        [facility]



               



               



               

 

 HEMATOLOGY  Hgb  14.8 g/dL  14.0 -         [place]



       18.0        [facility]



               



               



               

 

 HEMATOLOGY  MCV  85.0 fL  80.0 -        [facility]



       94.0        [facility]



               



               



               

 

 HEMATOLOGY  Hct  45.1 %  42.0 -         [place]



       54.0        [facility]



               



               



               

 

 HEMATOLOGY  RDW  17.5 %  11.5 -         [place]



       14.5        [facility]



               



               



               

 

 HEMATOLOGY  Eosinophils  0.2 K/CMM  0.0 - 0.5        [facility]



   #            [facility]



               



               



               

 

 HEMATOLOGY  Lymphocytes  2.0 K/CMM  1.0 - 5.5        [facility]



         [facility]



               



               



               

 

 HEMATOLOGY  Monocytes #  0.7 K/CMM  0.0 - 0.8  05       [place]



         /2018      [facility]



               



               



               

 

 HEMATOLOGY  Eosinophils  2.4 %  0.0 - 4.0         [place]



         /2018      [facility]



               



               



               

 

 HEMATOLOGY  Basophils  0.6 %  0.0 - 1.0         [place]



         /[address] [place] [address]



               



               



               

 

 HEMATOLOGY  Segs-Bands #  4.0 K/CMM  1.5 - 8.1         [place]



         /2018      [facility]



               



               



               

 

 HEMATOLOGY  Monocytes  10.4 %  2.0 - 12.0         [place]



         /2018      [facility]



               



               



               

 

 HEMATOLOGY  RBC Morph  Normal           [place]



         /2018      [facility]



     (18 2:07 AM)          [place]



               



               



               

 

 HEMATOLOGY  Segs  58.1 %  45.0 -         [place]



       75.0        [facility]



               



               



               

 

 HEMATOLOGY  Plt Morph  Normal           [place]



         /2018      [facility]



     (18 2:07 AM)          [place]



               



               



               

 

 HEMATOLOGY  Lymphocytes  28.5 %  20.0 -         [place]



       40.0        [facility]



               



               



               

 

 HEMATOLOGY  Basophils #  0.1 K/CMM  0.0 - 0.2        [facility]



               [facility]



               



               



               

 

 HEMATOLOGY  Polychrom  Moderate  None Seen        [facility]



               Medical



     *ABN*          [place]



               



     (18 11:07 AM)          



               

 

 TOXICOLOGY  Vanco Tr TND  *          [address]



               



               



               

 

 TOXICOLOGY  Vanco Tr  22.3 ug/ml          [address]



               



               



               

 

 CHEM PANEL  Magnesium  2.3 mg/dL  1.8 - 2.4        [facility]            [facility]



               



               



               

 

 CHEM PANEL  Phosphorus  3.5 mg/dL  2.5 - 4.5        [address]



               



               



               

 

 HEMATOLOGY  PT  14.0 s  12.0 -        [facility]



       14.      [facility]



               



               



               

 

 HEMATOLOGY  PTT  37.6 s  22.9 -        [facility]



       35.      [facility]



               



               



               

 

 HEMATOLOGY  INR  1.08  0.85 -        [facility]



       1.      [facility]



               



               



               

 

 IMMUNOLOGY  C-REACTIVE  13.1 mg/L  <=2.9 mg/L        [facility]



   PROTEIN            [facility]



               



               



               

 

 IMMUNOLOGY  Prealbumin  25.2 mg/dL  18.0 -        [facility]



       45.0        [facility]



               



               



               

 

 CHEM PANEL  Lactic Acid  0.9 mMol/L  0.5 - 2.2        [facility]            [facility]



               



               



               

 

 HEMATOLOGY  Sed Rate  5 mm/h  0 - 15        [address]



               



               



               

 

 IMMUNOLOGY  C-REACTIVE  15.8 mg/L  <=2.9 mg/L        [facility]



   PROTEIN      [address]



               



               



               

 

 HEMATOLOGY  PT  13.0 s  12.0 -        [facility]



       14.      [facility]



               



               



               

 

 HEMATOLOGY  INR  0.98  0.85 -        [facility]



       1.      [facility]



               



               



               

 

 HEMATOLOGY  PTT  33.2 s  22.9 -        [facility]



       35.      [facility]



               



               



               

 

 BLOOD BANK  Antibody  Negative          [facility]



 RESULTS  Scrn            [facility]



     (5/3/18 6:51 PM)          [place]



               



               



               

 

 BLOOD BANK  ABO/Rh  AB POS          [facility]



 RESULTS        [address] [place] [address]



               



               



               

 

 URINE AND  UA  0.2 EU/dL  0.1 - 1.0        [facility]



 STOOL  Urobilinogen      /[address] [place] [address]



               



               



               

 

 URINE AND  UA Nitrite  Negative  Negative        [facility]



 STOOL              [facility]



     (5/3/18 6:35 PM)          [place]



               



               



               

 

 URINE AND  UA Glucose  Negative  Negative        [facility]              [facility]



     (5/3/18 6:35 PM)          [place]



               



               



               

 

 URINE AND  UA Ketones  Negative  Negative        [place] [address]      Medical



     *NA*          Center



               



     (5/3/18 6:35 PM)          



               

 

 URINE AND  UA Bili  Negative  Negative        [facility]        2018      Medical



     *NA*          [place]



               



     (5/3/18 6:35 PM)          



               

 

 URINE AND  UA Blood  Trace  Negative        [facility]              Medical



     *ABN*          [place]



               



     (5/3/18 6:35 PM)          



               

 

 URINE AND  UA Leuk Est  Small  Negative        [facility]              Medical



     *ABN*          [place]



               



     (5/3/18 6:35 PM)          



               

 

 URINE AND  UA Spec Grav  1.020  <=1.030        [address]



               



               



               

 

 URINE AND  UA pH  6.0  5.0 - 8.0        [address]



               



               



               

 

 URINE AND  UA Color  Yellow  Yellow        [facility]        2018      Medical



     *NA*          [place]



               



     (5/3/18 6:35 PM)          



               

 

 URINE AND  UA Protein  Trace  Negative        [facility]              Medical



     *ABN*          [place]



               



     (5/3/18 6:35 PM)          



               

 

 URINE AND  UA Turbidity  Slight Cloudy  Clear        [address]



     (5/3/18 6:35 PM)          [place]



               



               



               

 

 URINE AND  UA Hyal Cast  0-2  0 - 2        [address]



     (5/3/18 6:35 PM)          [place]



               



               



               

 

 URINE AND  UA Bacteria  Occasional  None Seen        [facility]    /HPF  /HPF  /      [facility]



               



               



               

 

 URINE AND  UA RBC  11-20 /HPF  0 - 2        [address]



               



               



               

 

 URINE AND  UA Sq Epi  Occasional  Few /LPF        [facility]    /LPF    /[address] [place] [address]



               



               



               

 

 URINE AND  UA WBC    None Seen        [facility]    /HPF  /HPF  /[address] [place] [address]



               



               



               

 

 HEMATOLOGY  Basophils #  0.1 K/CMM  0.0 - 0.2        [address]



               



               



               

 

 HEMATOLOGY  Polychrom  Slight          [address]



               



               



               

 

 HEMATOLOGY  Plt Morph  Normal          [address]



     (5/3/18 6:20 PM)          [place]



               



               



               

 

 Brain  Brain shunt  EXAM: SKULL 2 VIEWS        -  [facility]



 shunt  series DX      /    -  Medical



 series DX    EXAM: CHEST 2 VIEWS        This report was dictated by a 
Radiology Resident/Fellow.  I have personally reviewed the images as  Center



             well as the Resident's interpretation and agree with the findings.
  



     EXAM: ABDOMEN 2 VIEWS        Read by:  Bernardo Lorenz MD        
  Resident:  Bernardo Lorenz MD  



             Dictated Date/time:  18 20:33  



             Electronically Signed by:  Martin Phillips MD                      
   18 22:11  



             FINAL REPORT  



     DATE: 5/3/2018 7:20 PM CDT          



               



               



               



     INDICATION: Headache. - Please include Codman view for shunt setting.     
     



               



               



               



     COMPARISON: Brain shuntogram 2013          



               



               



               



     TECHNIQUE: AP and lateral views of the skull, chest and abdomen.          



               



               



               



     FINDINGS:          



               



     There is a single intact shunt tube with the proximal aspect in the right 
frontal calvarium coursing across midline to the left anterior chest and 
abdomen with the tip coiled within the pelvis.          



               



               



               



     A right parietal shunt with distal tip in the right lateral abdomen is 
discontinuous. Another shunt present with proximal tip in the right neck base 
and distal tip in the medial mid abdomen          



               



     Cholecystectomy clips are noted. The lungs are clear but underinflated. 
Appearance of the pelvis is unchanged with bilateral shallow acetabular roofs. 
No obvious posterior dislocation. Spinal dysraphism          



      is seen with absence of the spinous process from L3 to S1.          



               



               



               



     IMPRESSION:          



               



               



               



     1. No significant change. The right transfrontal shunt catheter is intact 
along its course with the distal tip in the pelvis.          



               



     2. Discontinuous right parieto-occipital catheter and 2 discontinuous 
catheters in the right chest and abdomen are unchanged.          



               



     3. Spinal dysraphism.          



               



               



               



               

 

 Brain wo  Brain wo  EXAM: CT BRAIN WITHOUT CONTRAST        -  [facility] [facility] [place]



 contrast  contrast CT      /    -  Medical



 CT            This report was dictated by a Radiology Resident/Fellow.  I have 
personally reviewed the images as  Center



             well as the Resident's interpretation and agree with the findings.
  



     DATE: 5/3/2018 627 PM CDT        Read by:  Bernardo Lorenz MD    
      Resident:  Bernardo Lorenz MD  



             Dictated Date/time:  18 18:45  



             Electronically Signed by:  Martin Phillips MD                      
   18 21:12  



             FINAL REPORT  



     INDICATION: 32-year-old male patient with history of  shunt malfunction 
         



               



               



               



     COMPARISON: CT of the brain 2015, 2013.          



               



               



               



     TECHNIQUE: Axial CT images of the brain were obtained. Sagittal and 
coronal reformats.          



               



     IV contrast: None.          



               



               



               



     FINDINGS:          



               



     An orphaned right occipital approach  shunt is present. Also present is 
a right frontal approach  shunt with tip in the left lateral ventricle.      
    



               



     Narrowing of the lateral ventricles is present, unchanged from 2015. 
         



               



     There is mild uncal and cerebellar tonsillar herniation, also unchanged.  
        



               



     No recent hemorrhage or territorial infarct. No mass. No midline shift.   
       



               



     No scalp fluid collections.          



               



     Sinuses are clear.          



               



               



               



     IMPRESSION:          



               



     No acute intracranial abnormality.          



               



     Adequately decompressed ventricular system.          



               



               



               



               

 

 Chest  Chest 1view  EXAM: XR CHEST 1 VIEW        -  [facility], [place]



 1view DX  DX      /    -  Medical



             This report was dictated by a Radiology Resident/Fellow.  I have 
personally reviewed the images as  Center



             well as the Resident's interpretation and agree with the findings.
  



     DATE: 5/3/2018 6:12 PM CDT        Read by:  Olvin Palacio MD          
       Resident:  Olvin Palacio MD  



             Dictated Date/time:  18 18:31  



             Electronically Signed by:  Bakari Interiano MD              
   18 19:29  



             FINAL REPORT  



     INDICATION:  - picc line use          



               



               



               



     UT SECTION: ER          



               



               



               



     COMPARISON: None.          



               



               



               



     TECHNIQUE: AP chest          



               



               



               



     FINDINGS:          



               



     Lines, tubes and hardware:          



               



     Left PICC tip at mid SVC.          



               



               



               



     Lungs and pleura: No pulmonary or pleural based abnormality is identified. 
Pulmonary vascularity is normal.          



               



               



               



     Heart and mediastinum: The heart size is normal for technique. The 
mediastinal contours are normal.          



               



               



               



     Bones: No acute bony abnormality is identified.          



               



               



               



     Upper abdomen: Normal.          



               



               



               



               



               



     IMPRESSION:          



               



     Left PICC tip at mid SVC.          



               



               



               



     No acute cardiopulmonary abnormality is observed.          



               



               



               



               

 

 Laboratory  Sodium Level  142 mEq/L  135 - 145        [facility]



 Studies        /      [facility]



               [place]



               



               



               

 

 Laboratory  Potassium  4.4 mEq/L  3.6 - 5.0        [facility]



 Studies  Level      /      [facility]



               [place]



               



               



               

 

 Laboratory  Magnesium  1.8 mg/dL  1.8 - 2.5        [facility]



 Studies  Level      /      [facility]



               [place]



               



               



               

 

 Laboratory  Glucose  126 mg/dL  65 - 120        [facility]



 Studies  Level      /      [facility]



               [place]



               



               



               

 

 Laboratory  Estimat  null  90        [facility]



 Studies  Glomerular      /      [facility]



   Filtration            [place]



   Rate            



               



               

 

 Laboratory  Creatinine  0.83 mg/dL  0.61 -        [facility]



 Studies      1.24        [facility]



               [place]



               



               



               

 

 Laboratory  Chloride  108 mEq/L  101 - 111        [facility].



 Studies  Level      /      [facility]



               [place]



               



               



               

 

 Laboratory  Carbon  27 mEq/L  21 - 31        [facility].



 Studies  Dioxide      /      [facility]



   Level            [place]



               



               



               

 

 Laboratory  Calcium  9.1 mg/dL  8.5 - 10.5        [facility].



 Studies  Level      /      [facility]



               [place]



               



               



               

 

 Laboratory  Blood Urea  15 mg/dL  6 - 20        [facility].



 Studies  Nitrogen      /      [facility]



               [place]



               



               



               

 

 Laboratory  White Blood  7.0 K/uL  4.3 - 10.9        [facility].



 Studies  Count      /      [facility]



               [place]



               



               



               

 

 Laboratory  Red Cell  17.4 %  12.1 -        [facility].



 Studies  Distribution    15.2        [facility]



   Width            [place]



               



               



               

 

 Laboratory  Red Blood  5.14 M/uL  4.33 -        [facility].



 Studies  Count    5.43        [facility]



               [place]



               



               



               

 

 Laboratory  Platelet  270 K/uL  152 - 406        [facility].



 Studies  Count      /      [facility]



               [place]



               



               



               

 

 Laboratory  Neutrophils  62.3 %  41.7 -        [facility].



 Studies  %    73.7  /      [facility]



               [place]



               



               



               

 

 Laboratory  Monocytes %  9.3 %  3.3 - 12.3        [facility].



 Studies        /      [facility]



               [place]



               



               



               

 

 Laboratory  Mean  8.3 fL  7.6 - 11.3        [facility].



 Studies  Platelet      /      [facility]



   Volume            [place]



               



               



               

 

 Laboratory  Mean  82.8 fL  80 - 100        [facility].



 Studies  Corpuscular      /      [facility]



   Volume            [place]



               



               



               

 

 Laboratory  Mean  33.4 g/dL  32.0 -        [facility].



 Studies  Corpuscular    36.0        [facility]



   Hemoglobin            [place]



   Concent            



               



               

 

 Laboratory  Mean  27.6 pg  27.0 -        [facility].



 Studies  Corpuscular    35.0  /      [facility]



   Hemoglobin            [place]



               



               



               

 

 Laboratory  Lymphocytes  24.6 %  15.3 -        [facility].



 Studies  %    44.8  /      [facility]



               [place]



               



               



               

 

 Laboratory  Hemoglobin  14.2 g/dL  13.6 -        [facility]



 Studies      17.9  /      [facility]



               [place]



               



               



               

 

 Laboratory  Hematocrit  42.6 %  39.6 -        [facility]



 Studies      49.0  /      [facility]



               [place]



               



               



               

 

 Laboratory  Eosinophils  3.3 %  0 - 4.4        [facility]



 Studies  %      /      [facility]



               [place]



               



               



               

 

 Laboratory  Basophils %  0.5 %  0 - 1.3        [facility]



 Studies        /      [facility]



               [place]



               



               



               

 

 Laboratory  Absolute  4.4 K/uL  1.8 - 8.0        [facility]



 Studies  Neutrophil      /      [facility]



               [place]



               



               



               

 

 Laboratory  Absolute  0.7 K/uL  0.1 - 1.3        [facility]



 Studies  Monocytes      /      [facility]



   (CBC)            [place]



               



               



               

 

 Laboratory  Absolute  1.7 K/uL  0.7 - 4.9        [facility]



 Studies  Lymphocytes      /      [facility]



   (CBC)            [place]



               



               



               

 

 Laboratory  Absolute  0.2 K/uL  0 - 0.5        [facility]



 Studies  Eosinophils            [facility]



   (CBC)            [place]



               



               



               

 

 Laboratory  Absolute  0.0 K/uL  0 - 0.5        [facility]



 Studies  Basophils            [facility]



   (CBC)            [place]



               



               



               

 

 Microbiolo  Providencia  Providenci          [facility]



 gy Studies  Rettgeri  a Rettgeri    /      [facility]



               [place]



               



               



               

 

 Laboratory  Urine WBC  null          [facility]



 Studies        /      [facility]



               [place]



               



               



               

 

 Laboratory  Urine  null          [facility]



 Studies  Squamous      /      [facility]



   Epithelial            [place]



   Cells            



               



               

 

 Laboratory  Urine RBC  Urine RBC          [facility]



 Studies        /      [facility]



               [place]



               



               



               

 

 Laboratory  Urine  Urine          [facility]



 Studies  Culture  Culture    /      [facility]



   Reflexed  Reflexed          [place]



               



               



               

 

 Laboratory  Urine  null          [facility]



 Studies  Bacteria      /      [facility]



               [place]



               



               



               

 

 Laboratory  Urine pH  6.5          [facility]



 Studies        /2018      [facility]



               [place]



               



               



               

 

 Laboratory  Urine  2.0 mg/dL          [facility]



 Studies  Urobilinogen      /      [facility]



               [place]



               



               



               

 

 Laboratory  Urine Total  Urine          [facility]



 Studies  Protein  Total    /      [facility]



     Protein          [place]



               



               



               

 

 Laboratory  Urine  1.020          [facility]



 Studies  Specific      /      [facility]



   [place]            [place]



               



               



               

 

 Laboratory  Urine  Urine          [facility]



 Studies  Nitrite  Nitrite    /      [facility]



               [place]



               



               



               

 

 Laboratory  Urine  Urine          [facility]



 Studies  Leukocyte  Leukocyte    /      [facility]



   Esterase  Esterase          [place]



               



               



               

 

 Laboratory  Urine  Urine          [facility]



 Studies  Ketones  Ketones          [facility]



               [place]



               



               



               

 

 Laboratory  Urine  Urine          [facility]



 Studies  Glucose  Glucose          [facility]



               [place]



               



               



               

 

 Laboratory  Urine Color  Urine          [facility].



 Studies    Color    /      [facility]



               [place]



               



               



               

 

 Laboratory  Urine Blood  Urine          [facility].



 Studies    Blood          [facility]



               [place]



               



               



               

 

 Laboratory  Urine  Urine          [facility].



 Studies  Bilirubin  Bilirubin          [facility]



               [place]



               



               



               

 

 Laboratory  Urine  Urine          [facility].



 Studies  Appearance  Appearance    /      [facility]



               [place]



               



               



               

 

 Laboratory  Prothrombin  13.0  9.5 - 12.5        [facility]



 Studies  Time  SECONDS          [facility]



               [place]



               



               



               

 

 Laboratory  INR  1.10          [facility].



 Studies  Internationa      /      [facility]



   l Normalized            [place]



   Ratio            



               



               

 

 Laboratory  Total  1.9 mg/dL  0.3 - 1.2        [facility]



 Studies  Bilirubin      /      [facility]



               [place]



               



               



               

 

 Laboratory  Serum Total  7.8 g/dL  6.0 - 8.3        [facility].



 Studies  Protein      /      [facility]



               [place]



               



               



               

 

 Laboratory  Globulin  4.2 g/dL  2.3 - 3.5        [facility].



 Studies        /      [facility]



               [place]



               



               



               

 

 Laboratory  Direct  0.6 mg/dL  0 - 0.2        [facility]



 Studies  Bilirubin      /      [facility]



               [place]



               



               



               

 

 Laboratory  Aspartate  88 IU/L  10 - 42        [facility].



 Studies  Amino Transf      /      [facility]



   (AST/SGOT)            [place]



               



               



               

 

 Laboratory  Alkaline  192 IU/L  42 - 121        [facility].



 Studies  Phosphatase      /      [facility]



               [place]



               



               



               

 

 Laboratory  Albumin/Glob  0.9  1.1 - 1.8        [facility]



 Studies  ulin Ratio      /      [facility]



               [place]



               



               



               

 

 Laboratory  Albumin  3.6 g/dL  3.2 - 5.5        [facility].



 Studies        /      [facility]



               [place]



               



               



               

 

 Laboratory  Alanine  135 IU/L  10 - 60        [facility].



 Studies  Aminotransfe      /      [facility]



   rase            [place]



   (ALT/SGPT)            



               



               

 

 Laboratory  Procalcitoni  0.44 ng/mL          [facility].



 Studies  n            [facility]



               [place]



               



               



               

 

 Laboratory  B-Type  20 pg/ml          [facility].



 Studies  Natriuretic      /      [facility]



   Peptide            [place]



               



               



               

 

 Laboratory  Lipase  22 U/L  22 - 51        [facility]



 Studies              [facility]



               [place]



               



               



               

 

 Laboratory  Rapid  null          [facility]



 Studies  Troponin I            [facility]



               [place]



               



               



               

 

 Laboratory  Lactic Acid  8.6 mg/dL  4.5 - 19.8        [facility]



 Studies  Level            [facility]



               [place]



               



               



               

 

 Microbiolo  Morganella  Morganella          [facility]



 gy Studies  Morganii  Morganii          [facility]



               [place]



               



               



               

 

 Microbiolo  Proteus  Proteus          [facility]



 gy Studies  Mirabilis  Mirabilis          [facility]



               [place]



               



               



               

 

 Laboratory  Activated  32.2  24.3 -        [facility]



 Studies  Partial  SECONDS  36.9        [facility]



   Thromboplast            [place]



   Time            



               



               







Vital Signs







 Vital Sign  Value  Date  Comments  Source



         

 

 Temperature Oral (F)  97.2 F  2018    [facility]



         

 

 Systolic (mm Hg)  127  2018    [facility]



         

 

 Diastolic (mm Hg)  85  2018    [facility]



         

 

 Heart Rate  81  2018    [facility]



         

 

 Respitory Rate  18  2018    [facility]



         

 

 Heart Rate  90  2018    [facility]



         

 

 Systolic (mm Hg)  106  2018    [facility]



         

 

 Diastolic (mm Hg)  71  2018    [facility]



         

 

 Respitory Rate  18  2018    [facility]



         

 

 Temperature Oral (F)  98.1 F  2018    [facility]



         

 

 Respitory Rate  18  2018    [facility]



         

 

 Systolic (mm Hg)  168  2018    [facility]



         

 

 Diastolic (mm Hg)  107  2018    [facility]



         

 

 Heart Rate  87  2018    [facility]



         

 

 Temperature Oral (F)  98.1 F  2018    [facility]



         

 

 Weight  127.027  2018    [facility]



         

 

 Weight  127.027  2018    [facility]



         

 

 Weight  127.027  2018    [facility]



         

 

 BMI Calculated  48.16  2018    [facility]



         

 

 Height  162.56 cm  2018    [facility]



         

 

 Height  149.86 cm  2018    [facility]



         

 

 BMI Calculated  56.67  2018    [facility]



         

 

 Heart Rate  85  2018    [facility]



         [place]



         

 

 Systolic (mm Hg)  145  2018    [facility]



         [place]



         

 

 Diastolic (mm Hg)  66  2018    [facility]



         [place]



         

 

 Temperature Oral (F)  97.7 F  2018    [facility]



         [place]



         

 

 Respitory Rate  16  2018    [facility]



         [place]



         

 

 Height  59  2018    [facility]



         [place]



         

 

 Weight  280  2018    [facility]



         [place]



         







Encounters







 Location  Location  Encounter  Encounter  Reason  Attending  ADM  DC  Status  
Source



   Details  Type  Number  For  Provider  Date  Date    



         Visit          



                   



                   



                   

 

 [facility]    Discharged  [id number]          [facility]



 [facility]    Rec[facility]        /2017    [facility]



 [place]                  [place]



                   [place]



                   



                   

 

 [facility]    Discharged  [id number]          [facility]



 [facility]    Recurring        /2018    [facility]



 [place]                  [place]



                   [place]



                   



                   

 

 [facility]    Departed  [id number]          [facility]



 [facility]    Emergency        /2018    [facility]



 [place]                  [place]



                   [place]



                   



                   

 

 [facility]    Discharged  [id number]          [facility]



 [facility]    Recurring        /2018    [facility]



 [place]                  [place]



                   [place]



                   



                   

 

 [facility]    Registered  [id number]      03/15      [facility]



 [facility]    Recurring        /      [facility]



 [place]                  [place]



                   [place]



                   



                   

 

 [facility]    Discharged  [id number]          [facility]



 [facility]    Inpatient        /2018    [facility]



 [place]                  [place]



                   [place]



                   



                   

 

 [facility]    Inpatient  [id number]    [facility]      [facility]  /2018    [place]



                   



                   



                   







Procedures







 Procedure  Code  Date  Perfomer  Comments  Source



           



           

 

 Chest Single View  [id number]        [facility]



     [facility]      [place]



           



           

 

 Gram Stain  [id number]        [facility]



     8      [place]



           



           

 

 Culture & Sensitivity  [id number]        [facility] -



     8      [place]



           



           

 

 Anaerobic Blood  [id number]        [facility] -



 Culture    8      [place]



           



           

 

 Aerobic Blood Culture  [id number]        [facility]



     8      [place]



           



           

 

 Chest Single View  [id number]        [facility]



     [facility]      [place]



           



           

 

 Gram Stain  [id number]        [facility] -



     8      [place]



           



           

 

 Culture & Sensitivity  [id number]        [facility]



     8      [place]



           



           

 

 Chest Single View  [id number]        [facility] -



     8      [place]



           



           

 

 Cholecystectomy  [id number]        [facility]



           



           

 

 Shunt of cerebral  [id number]        [facility]



 ventricle to          [facility]



 extracranial site

## 2019-01-11 NOTE — ER
Nurse's Notes                                                                                     

 [facility]                                                                

Name: Redd Dale Jr                                                                            

Age: 33 yrs                                                                                       

Sex: Male                                                                                         

: 1985                                                                                   

MRN: [id number]                                                                                   

Arrival Date: 2019                                                                          

Time: 12:17                                                                                       

Account#: [id number]                                                                            

Bed 28                                                                                            

Private MD:                                                                                       

Diagnosis: Acute tubulo-interstitial nephritis                                                    

                                                                                                  

Presentation:                                                                                     

                                                                                             

12:32 Presenting complaint: Patient states: R flank pain that radiates to RLQ, also reports   ph  

      nausea, denies V/D or fever, states, " My suprapubic catheter stopped draining last         

      night. They put a new one in and it's draining now but my urine looks cloudy.".             

      Transition of care: patient was not received from another setting of care. Onset of         

      symptoms was 2019. Risk Assessment: Do you want to hurt yourself or someone     

      else? Patient reports no desire to harm self or others. Initial Sepsis Screen: Does the     

      patient meet any 2 criteria? No. Patient's initial sepsis screen is negative. Does the      

      patient have a suspected source of infection? Yes: Catheter related infection               

      (Contreras/dialysis/PICC/central line). Care prior to arrival: None.                            

12:32 Method Of Arrival: Wheelchair                                                           ph  

12:32 Acuity: AYESHA 3                                                                           ph  

                                                                                                  

Historical:                                                                                       

- Allergies:                                                                                      

12:35 Amoxicillin;                                                                            ph  

12:35 Bactrim;                                                                                ph  

12:35 Ciprofloxacin;                                                                          ph  

12:35 CLAVULANIC ACID;                                                                        ph  

12:35 Doxycycline;                                                                            ph  

12:35 Levofloxacin;                                                                           ph  

12:35 Morphine;                                                                               ph  

12:35 Toradol;                                                                                ph  

12:35 TRIMETHOPRIM;                                                                           ph  

12:35 Vancomycin;                                                                             ph  

12:35 Zofran;                                                                                 ph  

- PMHx:                                                                                           

12:35 Asthma; Cerebral Palsy; cluster headaches; decubitus ulcers on feet; GERD;              ph  

      Hydrocephalus; Hypertension; spina bifida;                                                  

- PSHx:                                                                                           

12:35 SHUNT REVISION ; AMPUTATION OF LOWER LIMB ;                                     ph  

                                                                                                  

- Immunization history:: Adult Immunizations unknown.                                             

- Social history:: Smoking status: Patient uses tobacco products, smokes one-half pack            

  cigarettes per day.                                                                             

- Ebola Screening: : No symptoms or risks identified at this time.                                

                                                                                                  

                                                                                                  

Screenin:02 Abuse screen: Denies threats or abuse. Nutritional screening: No deficits noted.        tl3 

      Tuberculosis screening: No symptoms or risk factors identified. Fall Risk Secondary         

      diagnosis (15 points) impaired mobility, pt is wheelchair bound.                            

                                                                                                  

Assessment:                                                                                       

13:02 General: Appears distressed, uncomfortable, Behavior is cooperative, appropriate for    3 

      age, anxious. Pain: Complains of pain in right flank and right abdomen Pain currently       

      is 9 out of 10 on a pain scale. at worst was 10 out of 10 on a pain scale. Neuro: Level     

      of Consciousness is awake, alert, obeys commands, Oriented to person, place, time,          

      situation, Appropriate for age. Cardiovascular: Patient's skin is warm and dry.             

      Respiratory: Airway is patent Respiratory effort is even, unlabored, Respiratory            

      pattern is regular, symmetrical. GI: Abdomen is round. : Contreras in place Reports Supra     

      pubic catheter stopped working last night, replaced this morning at nursing home, urine     

      is cloudy. EENT: No signs and/or symptoms were reported regarding the EENT system.          

      Derm: No signs and/or symptoms reported regarding the dermatologic system.                  

13:16 Reassessment: urine collected from contreras at upper port and sent to lab.                 3 

14:25 Reassessment: Patient and/or family updated on plan of care and expected duration. Pain tl3 

      level reassessed. Patient is alert, oriented x 3, equal unlabored respirations, skin        

      warm/dry/pink. pt has no needs at this time.                                                

14:32 Reassessment: Contacted [facility] to arrange transport of pt back to     [facility] 

      facility.                                                                                   

16:56 Reassessment: No changes from previously documented assessment. Patient and/or family   tl3 

      updated on plan of care and expected duration. Pain level reassessed. Patient is alert,     

      oriented x 3, equal unlabored respirations, skin warm/dry/pink.                             

16:57 GI: Bowel sounds present X 4 quads. Abd is non tender.                                  tl3 

                                                                                                  

Vital Signs:                                                                                      

12:35  / 94; Pulse 103; Resp 20; Temp 98.7(TE); Pulse Ox 97% on R/A; Weight 125.19 kg;  ph  

14:33  / 87; Pulse 96; Resp 16; Temp 98.3; Pulse Ox 95% on R/A;                         ag4 

                                                                                                  

ED Course:                                                                                        

12:17 Patient arrived in ED.                                                                  as  

12:34 Triage completed.                                                                       ph  

12:35 Arm band placed on Patient placed in waiting room, Patient notified of wait time.       ph  

12:59 Andrey Harper MD is Attending Physician.                                              gs  

13:02 Sabrina Meza, RN is Primary Nurse.                                                     tl3 

13:02 Patient has correct armband on for positive identification. Bed in low position. Call   tl3 

      light in reach. Side rails up X2. Warm blanket given. Pillow given.                         

13:02 No provider procedures requiring assistance completed.                                  tl3 

13:16 Urine collected: Contreras catheter specimen, cloudy.                                       tl3 

13:17 Urine Microscopic Only Sent.                                                            tl3 

14:25 Awaiting transportation.                                                                tl3 

14:25 Patient did not have IV access during this emergency room visit.                        tl3 

                                                                                                  

Administered Medications:                                                                         

13:45 Drug: Dilaudid 1 mg Route: IM; Site: left deltoid;                                      tl3 

14:24 Follow up: Response: No adverse reaction; Pain is decreased                             tl3 

14:00 Drug: Rocephin (cefTRIAXone) 1 grams Route: IM; Site: left vastus lateralis;            tl3 

14:24 Follow up: Response: No adverse reaction                                                tl3 

14:23 CANCELLED (Duplicate Order): Rocephin - (cefTRIAXone) 1 grams IVPB once over 30 mins;   tl3 

      (mix in 50 mL NS)                                                                           

15:21 Drug: Norco 10 mg-325 mg 1 tabs Route: PO;                                              tl3 

16:58 Follow up: Response: No adverse reaction; Pain is decreased                             tl3 

                                                                                                  

                                                                                                  

Outcome:                                                                                          

15:01 Discharge ordered by MD.                                                                  

16:56 Discharged to Rehab Facility                                                            tl3 

16:56 Condition: stable                                                                           

16:56 Discharge instructions given to patient, Instructed on discharge instructions, follow       

      up and referral plans. medication usage, Demonstrated understanding of instructions,        

      follow-up care, medications, Prescriptions given X 1.                                       

16:57 Patient left the ED.                                                                    tl3 

                                                                                                  

Signatures:                                                                                       

Opal Mcintyre Patricia, RN                      RN                                                      

Andrey Harper MD MD                                                      

Sabrina Meza, RN                       RN   tl3                                                  

Edgar Barnes                                 ag4                                                  

                                                                                                  

**************************************************************************************************

## 2019-01-11 NOTE — EDPHYS
Physician Documentation                                                                           

 [facility]                                                                

Name: Redd Dale Jr                                                                            

Age: 33 yrs                                                                                       

Sex: Male                                                                                         

: 1985                                                                                   

MRN: [id number]                                                                                   

Arrival Date: 2019                                                                          

Time: 12:17                                                                                       

Account#: [id number]                                                                            

Bed 28                                                                                            

Private MD:                                                                                       

ED Physician Andrey Harper                                                                       

HPI:                                                                                              

                                                                                             

14:53 This 33 yrs old Black Male presents to ER via Wheelchair with complaints of foul        gs  

      smelling urine r sided pain.                                                                

14:53 Onset: The symptoms/episode began/occurred today. Associated signs and symptoms:        gs  

      Pertinent negatives: fever. Severity of symptoms: At their worst the symptoms were          

      moderate, in the emergency department the symptoms are unchanged. The patient has           

      experienced similar episodes in the past, a few times. says sp tube was backed up for       

      1/2 a day got tube changed, says urine now smells foul mild amount of r flank pain.         

                                                                                                  

Historical:                                                                                       

- Allergies:                                                                                      

12:35 Amoxicillin;                                                                            ph  

12:35 Bactrim;                                                                                ph  

12:35 Ciprofloxacin;                                                                          ph  

12:35 CLAVULANIC ACID;                                                                        ph  

12:35 Doxycycline;                                                                            ph  

12:35 Levofloxacin;                                                                           ph  

12:35 Morphine;                                                                               ph  

12:35 Toradol;                                                                                ph  

12:35 TRIMETHOPRIM;                                                                           ph  

12:35 Vancomycin;                                                                             ph  

12:35 Zofran;                                                                                 ph  

- PMHx:                                                                                           

12:35 Asthma; Cerebral Palsy; cluster headaches; decubitus ulcers on feet; GERD;              ph  

      Hydrocephalus; Hypertension; spina bifida;                                                  

- PSHx:                                                                                           

12:35 SHUNT REVISION ; AMPUTATION OF LOWER LIMB ;                                     ph  

                                                                                                  

- Immunization history:: Adult Immunizations unknown.                                             

- Social history:: Smoking status: Patient uses tobacco products, smokes one-half pack            

  cigarettes per day.                                                                             

- Ebola Screening: : No symptoms or risks identified at this time.                                

                                                                                                  

                                                                                                  

ROS:                                                                                              

14:53 Constitutional: Negative for fever.                                                     gs  

14:53 All other systems are negative.                                                             

                                                                                                  

Exam:                                                                                             

14:53 Head/Face:  Normocephalic, atraumatic. Cardiovascular:  Regular rate and rhythm with a  gs  

      normal S1 and S2.  No gallops, murmurs, or rubs.  Normal PMI, no JVD.  No pulse             

      deficits. Respiratory:  Lungs have equal breath sounds bilaterally, clear to                

      auscultation and percussion.  No rales, rhonchi or wheezes noted.  No increased work of     

      breathing, no retractions or nasal flaring. Abdomen/GI:  Soft, non-tender, with normal      

      bowel sounds.  No distension or tympany.  No guarding or rebound.  No evidence of           

      tenderness throughout.                                                                      

14:53 Constitutional: The patient appears alert, awake.                                           

14:53 Back: CVA tenderness, that is mild.                                                         

14:53 Musculoskeletal/extremity: Exam is negative for acute changes.                              

14:53 Skin: Exam negative for acute changes.                                                      

                                                                                                  

Vital Signs:                                                                                      

12:35  / 94; Pulse 103; Resp 20; Temp 98.7(TE); Pulse Ox 97% on R/A; Weight 125.19 kg;  ph  

14:33  / 87; Pulse 96; Resp 16; Temp 98.3; Pulse Ox 95% on R/A;                         ag4 

                                                                                                  

MDM:                                                                                              

13:37 Patient medically screened.                                                               

14:53 Differential diagnosis: UTI, urinary retention, Ortega catheter problem. Data reviewed:    

      vital signs, nurses notes. Response to treatment: the patient's symptoms have markedly      

      improved after treatment, and as a result, I will discharge patient.                        

                                                                                                  

                                                                                             

12:59 Order name: Urine Microscopic Only; Complete Time: 13:37                                  

                                                                                             

13:35 Order name: Urine Culture                                                               [facility]

                                                                                             

13:35 Order name: Urine Dipstick--Ancillary (enter results)                                     

                                                                                             

12:59 Order name: Urine Dipstick-Ancillary (obtain specimen); Complete Time: 13:17              

                                                                                             

15:03 Order name: Diet Regular; Complete Time: 15:04                                          tl3 

                                                                                                  

Administered Medications:                                                                         

13:45 Drug: Dilaudid 1 mg Route: IM; Site: left deltoid;                                      tl3 

14:24 Follow up: Response: No adverse reaction; Pain is decreased                             tl3 

14:00 Drug: Rocephin (cefTRIAXone) 1 grams Route: IM; Site: left vastus lateralis;            tl3 

14:24 Follow up: Response: No adverse reaction                                                tl3 

14:23 CANCELLED (Duplicate Order): Rocephin - (cefTRIAXone) 1 grams IVPB once over 30 mins;   tl3 

      (mix in 50 mL NS)                                                                           

15:21 Drug: Norco 10 mg-325 mg 1 tabs Route: PO;                                              tl3 

16:58 Follow up: Response: No adverse reaction; Pain is decreased                             tl3 

                                                                                                  

                                                                                                  

Disposition:                                                                                      

19 15:01 Discharged to Home. Impression: Acute tubulo-interstitial nephritis.               

- Condition is Stable.                                                                            

- Discharge Instructions: Pyelonephritis, Adult.                                                  

- Prescriptions for cefpodoxime 200 mg Oral Tablet - take 1 tablet by ORAL route every            

  12 hours for 10 days with food; 20 tablet.                                                      

- Medication Reconciliation Form, Thank You Letter, Antibiotic Education, Prescription            

  Opioid Use form.                                                                                

- Follow up: Private Physician; When: 2 - 3 days; Reason: Re-evaluation by your                   

  physician.                                                                                      

                                                                                                  

                                                                                                  

                                                                                                  

Signatures:                                                                                       

Dispatcher [employer]                           Isabel Ortiz RN                      RN                                                      

Andrey Harper MD MD                                                      

Sabrina Meza RN                       RN   tl3                                                  

                                                                                                  

Corrections: (The following items were deleted from the chart)                                    

14:23 13:38 Rocephin - (cefTRIAXone) 1 grams IVPB once over 30 mins; (mix in 50 mL NS)        tl3 

      ordered.                                                                                  

16:57 15:01 2019 15:01 Discharged to Home. Impression: Acute tubulo-interstitial        tl3 

      nephritis. Condition is Stable. Forms are Medication Reconciliation Form, Thank You         

      Letter, Antibiotic Education, Prescription Opioid Use. Follow up: Private Physician;        

      When: 2 - 3 days; Reason: Re-evaluation by your physician.                                

                                                                                                  

**************************************************************************************************

## 2019-01-11 NOTE — XMS REPORT
[facility]

 Created on:September 15, 2018



Patient:Redd Dale

Sex:Male

:1985

External Reference #:[id number]





Demographics







 Address  [address] [place] [address]

 

 Phone  [phone]

 

 Preferred Language  en

 

 Marital Status  Unknown

 

 Faith Affiliation  Unknown

 

 Race  Black or 

 

 Ethnic Group  Unknown









Author







 Organization  [employer]









Care Team Providers







 Name  Role  Phone

 

 Knox, Na  Provider Role  Unavailable









Allergies, Adverse Reactions, Alerts







 Substance  Reaction  Event Type

 

 Zofran  Info Not Available  Drug Allergy

 

 Morphine Sulfate ER  Info Not Available  Drug Allergy

 

 Levaquin  Info Not Available  Drug Allergy







Problems







 Problem Type  Condition  Code  Onset Dates  Condition Status

 

 Assessment  Blood tests for routine general  Z00.00    Active



   physical examination      

 

 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



   disorder      

 

 Problem  Constipation  K59.00    Active

 

 Assessment  Ulcer of left foot, unspecified  L97.529    Active



   ulcer stage      

 

 Problem  Paraplegia  G82.20    Active

 

 Assessment   (ventriculoperitoneal) shunt  Z98.2    Active



   status      

 

 Problem  Nausea alone  R11.0    Active

 

 Problem  Fecal incontinence  R15.9    Active

 

 Problem  Incontinence of urine  R32    Active

 

 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



   disorder      

 

 Problem  Mental disorder, not otherwise  F99    Active



   specified      

 

 Assessment  Depression with anxiety  F41.8    Active

 

 Assessment  Bipolar depression  F31.30    Active

 

 Problem  Bipolar depression  F31.30    Active

 

 Assessment  Lumbar spina bifida with  Q05.2    Active



   hydrocephalus      

 

 Problem  Blood tests for routine general  Z00.00    Active



   physical examination      

 

 Problem  Depression with anxiety  F41.8    Active

 

 Problem  Nausea and vomiting, intractability  R11.2    Active



   of vomiting not specified,      



   unspecified vomiting type      

 

 Problem  Ulcer of left foot, unspecified  L97.529    Active



   ulcer stage      

 

 Problem  Nonintractable episodic headache,  R51    Active



   unspecified headache type      

 

 Problem   (ventriculoperitoneal) shunt  Z98.2    Active



   status      

 

 Problem  Episodic tension-type headache, not  G44.219    Active



   intractable      

 

 Problem  Lumbar spina bifida with  Q05.2    Active



   hydrocephalus      

 

 Problem  Foot ulcer  L97.509    Active

 

 Problem  Nicotine dependence  F17.200    Active

 

 Problem  Dependence on wheelchair  Z99.3    Active







Medications







 Medication  Code  Code  Instructions  Start  End  Status  Dosage



   System      Date  Date    

 

 Macrobid  NDC  98445165959  100 MG Orally      Active  1 capsule



       every 12 hrs        with food

 

 Tylenol with  NDC  53388015955  300-60 MG      Active  1 tablet as



 Codeine #4      Orally every 6        needed



       hrs        

 

 Pantoprazole  NDC  42462679533  40 MG Orally      Active  1 tablet



 Sodium      Once a day        

 

 Citalopram  NDC  09779975619  10 MG Orally      Active  1 tablet



 Hydrobromide      Once a day        

 

 Collagenase  NDC  [address]-56  250 UNIT/GM      Active  1 application



       Externally        to affected



       Once a day        area

 

 Seroquel  NDC  59587954033  25 MG Orally      Active  1 tablet



       Once a day at        



       bedtime        







Results

No Known Results



Summary Purpose

[employer] Submission

## 2019-01-11 NOTE — XMS REPORT
[facility]

 Created on:2018



Patient:Redd Dale

Sex:Male

:1985

External Reference #:[id number]





Demographics







 Address  [address] [place] [address]

 

 Phone  [phone]

 

 Preferred Language  en

 

 Marital Status  Unknown

 

 [employer] Affiliation  Unknown

 

 Race  Black or 

 

 Ethnic Group  Not  or 









Author







 Organization  [employer]









Care Team Providers







 Name  Role  Phone

 

 Knox, Na  Provider Role  Unavailable









Allergies

No Known Allergies



Problems







 Problem Type  Condition  Code  Onset Dates  Condition Status

 

 Problem  Nicotine dependence  F17.200    Active

 

 Problem  Lumbar spina bifida with  Q05.2    Active



   hydrocephalus      

 

 Problem  Dependence on wheelchair  Z99.3    Active

 

 Problem  Fecal incontinence  R15.9    Active

 

 Problem  Foot ulcer  L97.509    Active

 

 Problem  Depression with anxiety  F41.8    Active

 

 Problem  Paraplegia  G82.20    Active

 

 Problem  Constipation  K59.00    Active

 

 Problem  Incontinence of urine  R32    Active

 

 Problem  Nausea alone  R11.0    Active

 

 Problem  Episodic tension-type headache, not  G44.219    Active



   intractable      

 

 Problem  Nonintractable episodic headache,  R51    Active



   unspecified headache type      

 

 Problem   (ventriculoperitoneal) shunt  Z98.2    Active



   status      







Medications

No Known Medications



Results

No Known Results



Summary Purpose

[employer] Submission

## 2019-01-11 NOTE — XMS REPORT
Patient Summary Document

 Created on:2019



Patient:JORDAN VERDIN

Sex:Male

:1985

External Reference #:[id number]





Demographics







 Address  [address]



   [place] [address]

 

 Home Phone  [phone]

 

 Work Phone  [phone]

 

 Email Address  [phone]

 

 Preferred Language  Unknown

 

 Marital Status  Unknown

 

 Jewish Affiliation  Unknown

 

 Race  Unknown

 

 Additional Race(s)  Unavailable

 

 Ethnic Group  Unknown









Author







 Organization  [facility]

 

 Address  [address] [place] [address] [address]



   [place] [address]

 

 Phone  [phone]









Care Team Providers







 Name  Role  Phone

 

 RAMU TORRES  Unavailable  Unavailable









Problems

This patient has no known problems.



Allergies, Adverse Reactions, Alerts

This patient has no known allergies or adverse reactions.



Medications

This patient has no known medications.



Results







 Test Description  Test Time  Test Comments  Text Results  Atomic Results  
Result Comments









 BLOOD CULTURE  2017 16:22:00    









   Test Item  Value  Reference Range  Comments









 CULTURE (BEAKER) (test code=1095)  No growth in 5 days    



BLOOD [id number] 16:22:00





 Test Item  Value  Reference Range  Comments

 

 CULTURE (BEAKER) (test code=1095)  No growth in 5 days    



(MANUAL DIFFERENTIAL)2017 22:29:00





 Test Item  Value  Reference Range  Comments

 

 TOTAL COUNTED (BEAKER) (test code=1351)      

 

 WBC MORPHOLOGY (BEAKER) (test code=487)  Normal    

 

 PLT MORPHOLOGY (BEAKER) (test code=486)  Normal    

 

 RBC MORPHOLOGY (BEAKER) (test code=762)  Normal    



CBC W/PLT COUNT &amp; AUTO [id number] 22:28:00





 Test Item  Value  Reference Range  Comments

 

 WHITE BLOOD CELL COUNT (BEAKER) (test code=775)  7.3 K/ L  4.0-10.0  

 

 RED BLOOD CELL COUNT (BEAKER) (test code=761)  5.09 M/ L  4.20-5.80  

 

 HEMOGLOBIN (BEAKER) (test code=410)  13.0 GM/DL  13.0-16.8  

 

 HEMATOCRIT (BEAKER) (test code=411)  42.3 %  40.0-50.0  

 

 MEAN CORPUSCULAR VOLUME (BEAKER) (test code=753)  83.0 fL  82.0-98.0  

 

 MEAN CORPUSCULAR HEMOGLOBIN (BEAKER) (test  25.6 pg  27.0-33.0  



 code=751)      

 

 MEAN CORPUSCULAR HEMOGLOBIN CONC (BEAKER) (test  30.8 GM/DL  32.0-36.0  



 code=752)      

 

 RED CELL DISTRIBUTION WIDTH (BEAKER) (test  18.0 %  10.3-14.2  



 code=412)      

 

 PLATELET COUNT (BEAKER) (test code=756)  331 K/CU MM  150-430  

 

 MEAN PLATELET VOLUME (BEAKER) (test code=754)  8.5 fL  6.5-10.5  

 

 NUCLEATED RED BLOOD CELLS (BEAKER) (test  0 /100 WBC  0-0  



 code=413)      

 

 NEUTROPHILS RELATIVE PERCENT (BEAKER) (test  65 %    



 code=429)      

 

 LYMPHOCYTES RELATIVE PERCENT (BEAKER) (test  23 %    



 code=430)      

 

 MONOCYTES RELATIVE PERCENT (BEAKER) (test  8 %    



 code=431)      

 

 EOSINOPHILS RELATIVE PERCENT (BEAKER) (test  3 %    



 code=432)      

 

 BASOPHILS RELATIVE PERCENT (BEAKER) (test  1 %    



 code=437)      

 

 NEUTROPHILS ABSOLUTE COUNT (BEAKER) (test  4.75 K/ L  1.80-8.00  



 code=670)      

 

 LYMPHOCYTES ABSOLUTE COUNT (BEAKER) (test  1.68 K/ L  1.48-4.50  



 code=414)      

 

 MONOCYTES ABSOLUTE COUNT (BEAKER) (test  0.55 K/ L  0.00-1.30  



 code=415)      

 

 EOSINOPHILS ABSOLUTE COUNT (BEAKER) (test  0.24 K/ L  0.00-0.50  



 code=416)      

 

 BASOPHILS ABSOLUTE COUNT (BEAKER) (test  0.05 K/ L  0.00-0.20  



 code=417)      



[id number] METABOLIC [id number] 11:11:00





 Test Item  Value  Reference Range  Comments

 

 SODIUM (BEAKER) (test  138 meq/L  136-145  



 txkf=867)      

 

 POTASSIUM (BEAKER) (test  4.0 meq/L  3.5-5.1  



 code=379)      

 

 CHLORIDE (BEAKER) (test  108 meq/L    



 code=382)      

 

 CO2 (BEAKER) (test  23 meq/L  22-29  



 code=355)      

 

 BLOOD UREA NITROGEN  11 mg/dL  7-21  



 (BEAKER) (test code=354)      

 

 CREATININE (BEAKER) (test  0.74 mg/dL  0.57-1.25  



 code=358)      

 

 GLUCOSE RANDOM (BEAKER)  124 mg/dL    



 (test code=652)      

 

 CALCIUM (BEAKER) (test  8.9 mg/dL  8.4-10.2  



 code=697)      

 

 EGFR (BEAKER) (test  150 mL/min/1.73 sq m    ESTIMATED GFR IS NOT AS



 code=1092)      ACCURATE AS CREATININE



       CLEARANCE IN PREDICTING



       GLOMERULAR FILTRATION



       RATE. ESTIMATED GFR IS



       NOT APPLICABLE FOR



       DIALYSIS PATIENTS.



URINE [id number] 09:56:00





 Test Item  Value  Reference Range  Comments

 

 CULTURE (BEAKER) (test code=1095)  <10,000 col/mL skin jaime    



COMPREHENSIVE METABOLIC [id number] 08:49:00





 Test Item  Value  Reference Range  Comments

 

 TOTAL PROTEIN (BEAKER)  7.3 gm/dL  6.0-8.3  



 (test code=770)      

 

 ALBUMIN (BEAKER) (test  3.3 g/dL  3.5-5.0  



 code=1145)      

 

 ALKALINE PHOSPHATASE  89 U/L    



 (BEAKER) (test code=346)      

 

 BILIRUBIN TOTAL (BEAKER)  1.4 mg/dL  0.2-1.2  



 (test code=377)      

 

 SODIUM (BEAKER) (test  137 meq/L  136-145  



 zqxz=366)      

 

 POTASSIUM (BEAKER) (test  3.7 meq/L  3.5-5.1  



 code=379)      

 

 CHLORIDE (BEAKER) (test  108 meq/L    



 code=382)      

 

 CO2 (BEAKER) (test  17 meq/L  22-29  



 code=355)      

 

 BLOOD UREA NITROGEN  12 mg/dL  7-21  



 (BEAKER) (test code=354)      

 

 CREATININE (BEAKER) (test  0.78 mg/dL  0.57-1.25  



 code=358)      

 

 GLUCOSE RANDOM (BEAKER)  119 mg/dL    



 (test code=652)      

 

 CALCIUM (BEAKER) (test  8.6 mg/dL  8.4-10.2  



 code=697)      

 

 AST (SGOT) (BEAKER) (test  13 U/L  5-34  



 code=353)      

 

 ALT (SGPT) (BEAKER) (test  28 U/L  6-55  



 code=347)      

 

 EGFR (BEAKER) (test  141 mL/min/1.73 sq    ESTIMATED GFR IS NOT AS



 code=1092)  m    ACCURATE AS CREATININE



       CLEARANCE IN PREDICTING



       GLOMERULAR FILTRATION



       RATE. ESTIMATED GFR IS



       NOT APPLICABLE FOR



       DIALYSIS PATIENTS.



CBC W/PLT COUNT &amp; AUTO [id number] 08:46:00





 Test Item  Value  Reference Range  Comments

 

 WHITE BLOOD CELL COUNT (BEAKER) (test code=775)  9.4 K/ L  4.0-10.0  

 

 RED BLOOD CELL COUNT (BEAKER) (test code=761)  4.79 M/ L  4.20-5.80  

 

 HEMOGLOBIN (BEAKER) (test code=410)  12.8 GM/DL  13.0-16.8  

 

 HEMATOCRIT (BEAKER) (test code=411)  40.0 %  40.0-50.0  

 

 MEAN CORPUSCULAR VOLUME (BEAKER) (test code=753)  83.4 fL  82.0-98.0  

 

 MEAN CORPUSCULAR HEMOGLOBIN (BEAKER) (test  26.7 pg  27.0-33.0  



 code=751)      

 

 MEAN CORPUSCULAR HEMOGLOBIN CONC (BEAKER) (test  32.0 GM/DL  32.0-36.0  



 code=752)      

 

 RED CELL DISTRIBUTION WIDTH (BEAKER) (test  18.2 %  10.3-14.2  



 code=412)      

 

 PLATELET COUNT (BEAKER) (test code=756)  313 K/CU MM  150-430  

 

 MEAN PLATELET VOLUME (BEAKER) (test code=754)  8.6 fL  6.5-10.5  

 

 NUCLEATED RED BLOOD CELLS (BEAKER) (test  0 /100 WBC  0-0  



 code=413)      

 

 NEUTROPHILS RELATIVE PERCENT (BEAKER) (test  70 %    



 code=429)      

 

 LYMPHOCYTES RELATIVE PERCENT (BEAKER) (test  16 %    



 code=430)      

 

 MONOCYTES RELATIVE PERCENT (BEAKER) (test  12 %    



 code=431)      

 

 EOSINOPHILS RELATIVE PERCENT (BEAKER) (test  1 %    



 code=432)      

 

 BASOPHILS RELATIVE PERCENT (BEAKER) (test  1 %    



 code=437)      

 

 NEUTROPHILS ABSOLUTE COUNT (BEAKER) (test  6.54 K/ L  1.80-8.00  



 code=670)      

 

 LYMPHOCYTES ABSOLUTE COUNT (BEAKER) (test  1.50 K/ L  1.48-4.50  



 code=414)      

 

 MONOCYTES ABSOLUTE COUNT (BEAKER) (test  1.13 K/ L  0.00-1.30  



 code=415)      

 

 EOSINOPHILS ABSOLUTE COUNT (BEAKER) (test  0.13 K/ L  0.00-0.50  



 code=416)      

 

 BASOPHILS ABSOLUTE COUNT (BEAKER) (test  0.06 K/ L  0.00-0.20  



 code=417)      



0.00URINALYSIS W/ [id number] 20:36:00





 Test Item  Value  Reference Range  Comments

 

 COLOR (BEAKER) (test code=470)  Yellow    

 

 CLARITY (BEAKER) (test code=469)  Hazy    

 

 SPECIFIC GRAVITY UA (BEAKER) (test code=468)  1.012  1.001-1.035  

 

 PH UA (BEAKER) (test code=467)  5.5  5.0-8.0  

 

 PROTEIN UA (BEAKER) (test code=464)  100 mg/dL  Negative  

 

 GLUCOSE UA (BEAKER) (test code=365)  Negative  Negative  

 

 KETONES UA (BEAKER) (test code=371)  Negative  Negative  

 

 BILIRUBIN UA (BEAKER) (test code=462)  Negative  Negative  

 

 BLOOD UA (BEAKER) (test code=461)  Moderate  Negative  

 

 NITRITE UA (BEAKER) (test code=465)  Negative  Negative  

 

 LEUKOCYTE ESTERASE UA (BEAKER) (test code=466)  Large  Negative  

 

 UROBILINOGEN UA (BEAKER) (test code=463)  3.0 mg/dL  0.2-1.0  

 

 RBC UA (BEAKER) (test code=519)  19 /HPF    

 

 WBC UA (BEAKER) (test code=520)  182 /HPF    

 

 SOURCE(BEAKER) (test code=0023)      



BASIC METABOLIC [id number] 17:00:00





 Test Item  Value  Reference Range  Comments

 

 SODIUM (BEAKER) (test  140 meq/L  136-145  



 gctg=957)      

 

 POTASSIUM (BEAKER) (test  3.7 meq/L  3.5-5.1  



 code=379)      

 

 CHLORIDE (BEAKER) (test  112 meq/L    



 code=382)      

 

 CO2 (BEAKER) (test  17 meq/L  22-29  



 code=355)      

 

 BLOOD UREA NITROGEN  23 mg/dL  7-21  



 (BEAKER) (test code=354)      

 

 CREATININE (BEAKER) (test  1.00 mg/dL  0.57-1.25  



 code=358)      

 

 GLUCOSE RANDOM (BEAKER)  102 mg/dL    



 (test code=652)      

 

 CALCIUM (BEAKER) (test  8.7 mg/dL  8.4-10.2  



 code=697)      

 

 EGFR (BEAKER) (test  106 mL/min/1.73 sq m    ESTIMATED GFR IS NOT AS



 code=1092)      ACCURATE AS CREATININE



       CLEARANCE IN PREDICTING



       GLOMERULAR FILTRATION



       RATE. ESTIMATED GFR IS



       NOT APPLICABLE FOR



       DIALYSIS PATIENTS.



Specimen slightly ictericCBC W/PLT COUNT &amp; AUTO [id number] 12:18
:00





 Test Item  Value  Reference Range  Comments

 

 WHITE BLOOD CELL COUNT (BEAKER) (test code=775)  16.4 K/ L  4.0-10.0  

 

 RED BLOOD CELL COUNT (BEAKER) (test code=761)  5.22 M/ L  4.20-5.80  

 

 HEMOGLOBIN (BEAKER) (test code=410)  14.4 GM/DL  13.0-16.8  

 

 HEMATOCRIT (BEAKER) (test code=411)  42.9 %  40.0-50.0  

 

 MEAN CORPUSCULAR VOLUME (BEAKER) (test code=753)  82.2 fL  82.0-98.0  

 

 MEAN CORPUSCULAR HEMOGLOBIN (BEAKER) (test  27.5 pg  27.0-33.0  



 code=751)      

 

 MEAN CORPUSCULAR HEMOGLOBIN CONC (BEAKER) (test  33.5 GM/DL  32.0-36.0  



 code=752)      

 

 RED CELL DISTRIBUTION WIDTH (BEAKER) (test  16.2 %  10.3-14.2  



 code=412)      

 

 PLATELET COUNT (BEAKER) (test code=756)  329 K/CU MM  150-430  

 

 MEAN PLATELET VOLUME (BEAKER) (test code=754)  8.2 fL  6.5-10.5  

 

 NUCLEATED RED BLOOD CELLS (BEAKER) (test  0 /100 WBC  0-0  



 code=413)      

 

 NEUTROPHILS RELATIVE PERCENT (BEAKER) (test  83 %    



 code=429)      

 

 LYMPHOCYTES RELATIVE PERCENT (BEAKER) (test  7 %    



 code=430)      

 

 MONOCYTES RELATIVE PERCENT (BEAKER) (test  9 %    



 code=431)      

 

 EOSINOPHILS RELATIVE PERCENT (BEAKER) (test  0 %    



 code=432)      

 

 BASOPHILS RELATIVE PERCENT (BEAKER) (test  0 %    



 code=437)      

 

 NEUTROPHILS ABSOLUTE COUNT (BEAKER) (test  1.62 K/ L  1.80-8.00  



 code=670)      

 

 LYMPHOCYTES ABSOLUTE COUNT (BEAKER) (test  1.15 K/ L  1.48-4.50  



 code=414)      

 

 MONOCYTES ABSOLUTE COUNT (BEAKER) (test  1.56 K/ L  0.00-1.30  



 code=415)      

 

 EOSINOPHILS ABSOLUTE COUNT (BEAKER) (test  0.01 K/ L  0.00-0.50  



 code=416)      

 

 BASOPHILS ABSOLUTE COUNT (BEAKER) (test  0.02 K/ L  0.00-0.20  



 code=417)      



(MANUAL DIFFERENTIAL)2017 12:18:00





 Test Item  Value  Reference Range  Comments

 

 TOTAL COUNTED (BEAKER) (test code=1351)      

 

 WBC MORPHOLOGY (BEAKER) (test code=487)  Normal    

 

 PLT MORPHOLOGY (BEAKER) (test code=486)  Normal    

 

 RBC MORPHOLOGY (BEAKER) (test code=762)  Normal

## 2019-01-11 NOTE — XMS REPORT
[facility]

 Created on:2018



Patient:Redd Dale

Sex:Male

:1985

External Reference #:[id number]





Demographics







 Address  [address] [place] [address]

 

 Phone  [phone]

 

 Preferred Language  en

 

 Marital Status  Unknown

 

 Islam Affiliation  Unknown

 

 Race  Black or 

 

 Ethnic Group  Not  or 









Author







 Organization  [facility]









Care Team Providers







 Name  Role  Phone

 

 Knox, Na  Provider Role  Unavailable









Allergies

No Known Allergies



Problems







 Problem Type  Condition  Code  Onset Dates  Condition Status

 

 Problem  Nicotine dependence  F17.200    Active

 

 Problem  Lumbar spina bifida with  Q05.2    Active



   hydrocephalus      

 

 Problem  Dependence on wheelchair  Z99.3    Active

 

 Problem  Fecal incontinence  R15.9    Active

 

 Problem  Foot ulcer  L97.509    Active

 

 Problem  Depression with anxiety  F41.8    Active

 

 Problem  Paraplegia  G82.20    Active

 

 Problem  Constipation  K59.00    Active

 

 Problem  Incontinence of urine  R32    Active

 

 Problem  Nausea alone  R11.0    Active

 

 Problem  Episodic tension-type headache, not  G44.219    Active



   intractable      

 

 Problem  Nonintractable episodic headache,  R51    Active



   unspecified headache type      

 

 Problem   (ventriculoperitoneal) shunt  Z98.2    Active



   status      







Medications

No Known Medications



Results

No Known Results



Summary Purpose

[employer] Submission

## 2019-01-11 NOTE — XMS REPORT
Clinical Summary

 Created on:2019



Patient:Redd Dale

Sex:Male

:1985

External Reference #:[id number]





Demographics







 Address  [address]



   [place] [address]

 

 Home Phone  [phone]

 

 Preferred Language  English

 

 Marital Status  Unknown

 

 Sikhism Affiliation  Unknown

 

 Race  Black or 

 

 Ethnic Group  Not  or 









Author







 Organization  [facility]

 

 Address  [address] [place] [address]









Support







 Name  Relationship  Address  Phone

 

 Sabrina Dale  Unavailable  [address]  [phone]



     [place] [address]  









Care Team Providers







 Name  Role  Phone

 

 Ta  Primary Care Provider  [phone]









Allergies







 Active Allergy  Reactions  Severity  Noted Date  Comments

 

 Sulfamethoxazole-Trimethoprim  Hives  High  2017  

 

 Levofloxacin  Hives  High  2017  

 

 Morphine  Hives  High  2017  

 

 Sesame Seed  Hives    2017  

 

 Ketorolac  Rash  High  2017  

 

 Vancomycin Analogues  Rash  High  2017  

 

 Ondansetron Hcl (Pf)  Nausea And Vomiting    2017  







Medications







 Medication  Sig  Dispensed  Refills  Start Date  End Date  Status

 

 oxyCODONE-acetaminophe  Take 1 tablet by    0      Active



 n (PERCOCET)  mg  mouth every 4          



 per tablet  (four) hours as          



   needed for Pain.          







Active Problems







 Problem  Noted Date

 

 Spina bifida  2017

 

 Pyelonephritis  2017







Social History







 Tobacco Use  Types  Packs/Day  Years Used  Date

 

 Current Every Day Smoker  Cigarettes  0.5    









 Tobacco Cessation: Ready to Quit: No









 Sex Assigned at Birth  Date Recorded

 

 Not on file  









 Job Start Date  Occupation  Industry

 

 Not on file  Not on file  Not on file









 Travel History  Travel Start  Travel End









 No recent travel history available.







Last Filed Vital Signs

Not on file



Plan of Treatment

Not on file



Results

Not on fileafter 01/10/2018



Insurance







 Payer  Benefit Plan / Group  Subscriber ID  Type  Phone  Address

 

 MEDICAID - MEDICAID  [employer]  [id number]  Medicaid    



 [facility]      Non-Contracted    









 Guarantor Name  Account Type  Relation to  Date of  Phone  Billing Address



     Patient  Birth    

 

 Redd Dale  Personal/Family  Self  1985  [phone]  [address]



         (Home)  [address]







           [place]



           [address]







Advance Directives

For more information, please contact:[facility], [place] [phone]





 Code Status  Date Activated  Date Inactivated  Comments

 

 Full Code  2017  1:36 AM  2017  8:43 PM  









 This code status was determined by:  Patient

## 2019-01-18 ENCOUNTER — HOSPITAL ENCOUNTER (EMERGENCY)
Dept: HOSPITAL 97 - ER | Age: 34
LOS: 1 days | Discharge: HOME | End: 2019-01-19
Payer: COMMERCIAL

## 2019-01-18 DIAGNOSIS — Z88.1: ICD-10-CM

## 2019-01-18 DIAGNOSIS — Z88.6: ICD-10-CM

## 2019-01-18 DIAGNOSIS — R51: Primary | ICD-10-CM

## 2019-01-18 DIAGNOSIS — Z88.8: ICD-10-CM

## 2019-01-18 DIAGNOSIS — Z88.3: ICD-10-CM

## 2019-01-18 PROCEDURE — 75809 NONVASCULAR SHUNT X-RAY: CPT

## 2019-01-18 PROCEDURE — 70450 CT HEAD/BRAIN W/O DYE: CPT

## 2019-01-18 PROCEDURE — 49427 INJECTION ABDOMINAL SHUNT: CPT

## 2019-01-18 NOTE — XMS REPORT
[facility]

 Created on:2018



Patient:Redd Dale

Sex:Male

:1985

External Reference #:[id number]





Demographics







 Address  [address] [place] [address]

 

 Phone  [phone]

 

 Preferred Language  en

 

 Marital Status  Unknown

 

 Jainism Affiliation  Unknown

 

 Race  Black or 

 

 Ethnic Group  Not  or 









Author







 Organization  [employer]









Care Team Providers







 Name  Role  Phone

 

 Knox, Na  Provider Role  Unavailable









Allergies

No Known Allergies



Problems







 Problem Type  Condition  Code  Onset Dates  Condition Status

 

 Problem  Nicotine dependence  F17.200    Active

 

 Problem  Lumbar spina bifida with  Q05.2    Active



   hydrocephalus      

 

 Problem  Dependence on wheelchair  Z99.3    Active

 

 Problem  Fecal incontinence  R15.9    Active

 

 Problem  Foot ulcer  L97.509    Active

 

 Problem  Depression with anxiety  F41.8    Active

 

 Problem  Paraplegia  G82.20    Active

 

 Problem  Constipation  K59.00    Active

 

 Problem  Incontinence of urine  R32    Active

 

 Problem  Nausea alone  R11.0    Active

 

 Problem  Episodic tension-type headache, not  G44.219    Active



   intractable      

 

 Problem  Nonintractable episodic headache,  R51    Active



   unspecified headache type      

 

 Problem   (ventriculoperitoneal) shunt  Z98.2    Active



   status      







Medications

No Known Medications



Results

No Known Results



Summary Purpose

[employer] Submission

## 2019-01-18 NOTE — RAD REPORT
EXAM DESCRIPTION:  RAD - Shuntogram - 1/18/2019 8:56 pm

 

CLINICAL HISTORY:  Vomiting, headache, history of  shunt

 

COMPARISON:  Shunt series November 2018

 

TECHNIQUE:  AP and lateral views of the skull were obtained. AP and lateral views of cervical spine a
nd neck soft tissues obtained. AP projections of the chest abdomen and pelvis also obtained. There we
re 8 images obtained in total.

 

FINDINGS:  Current and prior shunt tubing is in place. No abnormal bend or kink of the tubing. No adelita
picious or unexpected finding.

 

IMPRESSION:  Negative shunt series for acute or significant finding.

## 2019-01-18 NOTE — XMS REPORT
[facility]

 Created on:2018



Patient:Redd Dale

Sex:Male

:1985

External Reference #:[id number]





Demographics







 Address  [address] [place] [address]

 

 Phone  [phone]

 

 Preferred Language  en

 

 Marital Status  Unknown

 

 Adventism Affiliation  Unknown

 

 Race  Black or 

 

 Ethnic Group  Not  or 









Author







 Organization  [employer]









Care Team Providers







 Name  Role  Phone

 

 Knox, Na  Provider Role  Unavailable









Allergies

No Known Allergies



Problems







 Problem Type  Condition  Code  Onset Dates  Condition Status

 

 Problem  Nicotine dependence  F17.200    Active

 

 Problem  Lumbar spina bifida with  Q05.2    Active



   hydrocephalus      

 

 Problem  Dependence on wheelchair  Z99.3    Active

 

 Problem  Fecal incontinence  R15.9    Active

 

 Problem  Foot ulcer  L97.509    Active

 

 Problem  Depression with anxiety  F41.8    Active

 

 Problem  Paraplegia  G82.20    Active

 

 Problem  Constipation  K59.00    Active

 

 Problem  Incontinence of urine  R32    Active

 

 Problem  Nausea alone  R11.0    Active

 

 Problem  Episodic tension-type headache, not  G44.219    Active



   intractable      

 

 Problem  Nonintractable episodic headache,  R51    Active



   unspecified headache type      

 

 Problem   (ventriculoperitoneal) shunt  Z98.2    Active



   status      







Medications

No Known Medications



Results

No Known Results



Summary Purpose

[employer] Submission

## 2019-01-18 NOTE — EDPHYS
Physician Documentation                                                                           

 [facility]                                                                

Name: Redd Dale Jr                                                                            

Age: 33 yrs                                                                                       

Sex: Male                                                                                         

: 1985                                                                                   

MRN: [id number]                                                                                   

Arrival Date: 2019                                                                          

Time: 18:16                                                                                       

Account#: [id number]                                                                            

Bed 24                                                                                            

Private MD:                                                                                       

ED Physician Andrey Harper                                                                       

HPI:                                                                                              

                                                                                             

21:56 This 33 yrs old Black Male presents to ER via EMS with complaints of Headache.          gs  

21:56 The patient complains of pain to the forehead. The patient describes the headache as    gs  

      throbbing. Onset: The symptoms/episode began/occurred gradually, this morning.              

      Associated signs and symptoms: Pertinent positives: vomiting, Pertinent negatives:          

      fever. Severity of symptoms: At its worst the pain was moderate, in the emergency           

      department the pain is unchanged. The symptoms are alleviated by nothing. the symptoms      

      are aggravated by nothing. The patient has experienced similar episodes in the past,        

      several times.                                                                              

                                                                                                  

Historical:                                                                                       

- Allergies:                                                                                      

18:19 Amoxicillin;                                                                            sv  

18:19 Bactrim;                                                                                sv  

18:19 Ciprofloxacin;                                                                          sv  

18:19 CLAVULANIC ACID;                                                                        sv  

18:19 Doxycycline;                                                                            sv  

18:19 Levofloxacin;                                                                           sv  

18:19 Morphine;                                                                               sv  

18:19 Toradol;                                                                                sv  

18:19 TRIMETHOPRIM;                                                                           sv  

18:19 Vancomycin;                                                                             sv  

18:19 Zofran;                                                                                 sv  

- PMHx:                                                                                           

18:19 Asthma; Cerebral Palsy; cluster headaches; decubitus ulcers on feet; GERD;              sv  

      Hydrocephalus; Hypertension; spina bifida;                                                  

- PSHx:                                                                                           

18:19 SHUNT REVISION ; AMPUTATION OF LOWER LIMB ;                                     sv  

                                                                                                  

- Immunization history:: Adult Immunizations up to date.                                          

- Social history:: Smoking status: Patient uses tobacco products, smokes one-half pack            

  cigarettes per day.                                                                             

- Ebola Screening: : No symptoms or risks identified at this time.                                

                                                                                                  

                                                                                                  

ROS:                                                                                              

21:56 All other systems are negative.                                                         gs  

                                                                                                  

Exam:                                                                                             

21:56 Eyes:  Pupils equal round and reactive to light, extra-ocular motions intact.  Lids and gs  

      lashes normal.  Conjunctiva and sclera are non-icteric and not injected.  Cornea within     

      normal limits.  Periorbital areas with no swelling, redness, or edema. ENT:  Nares          

      patent. No nasal discharge, no septal abnormalities noted.  Tympanic membranes are          

      normal and external auditory canals are clear.  Oropharynx with no redness, swelling,       

      or masses, exudates, or evidence of obstruction, uvula midline.  Mucous membranes           

      moist. Neck:  Trachea midline, no thyromegaly or masses palpated, and no cervical           

      lymphadenopathy.  Supple, full range of motion without nuchal rigidity, or vertebral        

      point tenderness.  No Meningismus. Chest/axilla:  Normal chest wall appearance and          

      motion.  Nontender with no deformity.  No lesions are appreciated. Cardiovascular:          

      Regular rate and rhythm with a normal S1 and S2.  No gallops, murmurs, or rubs.  Normal     

      PMI, no JVD.  No pulse deficits. Respiratory:  Lungs have equal breath sounds               

      bilaterally, clear to auscultation and percussion.  No rales, rhonchi or wheezes noted.     

       No increased work of breathing, no retractions or nasal flaring. Abdomen/GI:  Soft,        

      non-tender, with normal bowel sounds.  No distension or tympany.  No guarding or            

      rebound.  No evidence of tenderness throughout. Back:  No spinal tenderness.  No            

      costovertebral tenderness.  Full range of motion. Skin:  Warm, dry with normal turgor.      

      Normal color with no rashes, no lesions, and no evidence of cellulitis. MS/ Extremity:      

      Pulses equal, no cyanosis.  Neurovascular intact.  Full, normal range of motion.            

21:56 Constitutional: The patient appears alert, awake.                                           

21:56 Neuro: Exam negative for acute changes, focal neuro deficits.                               

                                                                                                  

Vital Signs:                                                                                      

18:19  / 92; Pulse 81; Resp 18; Temp 98.4; Pulse Ox 99% ; Weight 125.19 kg;             sv  

22:00  / 77; Pulse 81; Resp 18; Pulse Ox 98% on R/A;                                    mg2 

22:45  / 80; Pulse 92; Resp 16; Pulse Ox 100% on R/A;                                   tl3 

                                                                                                  

MDM:                                                                                              

19:35 Patient medically screened.                                                               

21:56 Differential diagnosis: migraine, tension headache, shunt malfunction. Data reviewed:     

      vital signs, nurses notes. Response to treatment: the patient's symptoms have resolved      

      after treatment, and as a result, I will discharge patient.                                 

                                                                                                  

                                                                                             

19:37 Order name: CT Head Brain wo Cont; Complete Time: 21:39                                 gs  

                                                                                             

19:37 Order name: Shuntogram XRAY; Complete Time: 21:10                                       gs  

                                                                                                  

Administered Medications:                                                                         

21:00 Drug: Phenergan 12.5 mg Route: IVP; Infused Over: 5 mins; Site: right forearm;          tl3 

23:10 Follow up: Response: No adverse reaction                                                tl3 

21:10 Not Given (wrong order): Zofran 4 mg IVP once; over 2 minutes                           gs  

21:35 Not Given (Patient Refused): fentaNYL (PF) 50 mcg IVP once                              tl3 

21:35 Drug: Dilaudid 0.5 mg Route: IVP; Infused Over: 2 mins; Site: right forearm;            tl3 

23:08 Follow up: Response: No adverse reaction                                                tl3 

21:59 Drug: NS 0.9% 1000 ml Route: IV; Rate: 1 bolus; Site: right forearm;                    mg2 

23:08 Follow up: IV Status: Completed infusion; IV Intake: 1000ml                             tl3 

                                                                                                  

                                                                                                  

Disposition:                                                                                      

19 22:00 Discharged to Home. Impression: Headache.                                          

- Condition is Stable.                                                                            

- Discharge Instructions: General Headache Without Cause.                                         

- Prescriptions for promethazine 25 mg Oral Tablet - take 1 tablet by ORAL route every            

  6 hours As needed; 12 tablet.                                                                   

- Medication Reconciliation Form, Thank You Letter, Antibiotic Education, Prescription            

  Opioid Use form.                                                                                

- Follow up: Private Physician; When: 2 - 3 days; Reason: Re-evaluation by your                   

  physician.                                                                                      

                                                                                                  

                                                                                                  

                                                                                                  

Signatures:                                                                                       

Dispatcher [employer]                           EDJudy Mccoy RN                    RN                                                      

Andrey Harper MD MD                                                      

Sabrina Meza RN RN   tl3                                                  

Miguel Skaggs RN                    RN   mg2                                                  

                                                                                                  

Corrections: (The following items were deleted from the chart)                                    

                                                                                             

01:10  22:00 2019 22:00 Discharged to Home. Impression: Headache. Condition is     tl3 

      Stable. Forms are Medication Reconciliation Form, Thank You Letter, Antibiotic              

      Education, Prescription Opioid Use. Follow up: Private Physician; When: 2 - 3 days;         

      Reason: Re-evaluation by your physician. gs                                                 

                                                                                                  

**************************************************************************************************

## 2019-01-18 NOTE — XMS REPORT
[facility]

 Created on:September 15, 2018



Patient:Redd Dale

Sex:Male

:1985

External Reference #:[id number]





Demographics







 Address  [address] [place] [address]

 

 Phone  [phone]

 

 Preferred Language  en

 

 Marital Status  Unknown

 

 [employer] Affiliation  Unknown

 

 Race  Black or 

 

 Ethnic Group  Unknown









Author







 Organization  [employer]









Care Team Providers







 Name  Role  Phone

 

 Knox, Na  Provider Role  Unavailable









Allergies, Adverse Reactions, Alerts







 Substance  Reaction  Event Type

 

 Zofran  Info Not Available  Drug Allergy

 

 Morphine Sulfate ER  Info Not Available  Drug Allergy

 

 Levaquin  Info Not Available  Drug Allergy







Problems







 Problem Type  Condition  Code  Onset Dates  Condition Status

 

 Assessment  Blood tests for routine general  Z00.00    Active



   physical examination      

 

 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



   disorder      

 

 Problem  Constipation  K59.00    Active

 

 Assessment  Ulcer of left foot, unspecified  L97.529    Active



   ulcer stage      

 

 Problem  Paraplegia  G82.20    Active

 

 Assessment   (ventriculoperitoneal) shunt  Z98.2    Active



   status      

 

 Problem  Nausea alone  R11.0    Active

 

 Problem  Fecal incontinence  R15.9    Active

 

 Problem  Incontinence of urine  R32    Active

 

 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



   disorder      

 

 Problem  Mental disorder, not otherwise  F99    Active



   specified      

 

 Assessment  Depression with anxiety  F41.8    Active

 

 Assessment  Bipolar depression  F31.30    Active

 

 Problem  Bipolar depression  F31.30    Active

 

 Assessment  Lumbar spina bifida with  Q05.2    Active



   hydrocephalus      

 

 Problem  Blood tests for routine general  Z00.00    Active



   physical examination      

 

 Problem  Depression with anxiety  F41.8    Active

 

 Problem  Nausea and vomiting, intractability  R11.2    Active



   of vomiting not specified,      



   unspecified vomiting type      

 

 Problem  Ulcer of left foot, unspecified  L97.529    Active



   ulcer stage      

 

 Problem  Nonintractable episodic headache,  R51    Active



   unspecified headache type      

 

 Problem   (ventriculoperitoneal) shunt  Z98.2    Active



   status      

 

 Problem  Episodic tension-type headache, not  G44.219    Active



   intractable      

 

 Problem  Lumbar spina bifida with  Q05.2    Active



   hydrocephalus      

 

 Problem  Foot ulcer  L97.509    Active

 

 Problem  Nicotine dependence  F17.200    Active

 

 Problem  Dependence on wheelchair  Z99.3    Active







Medications







 Medication  Code  Code  Instructions  Start  End  Status  Dosage



   System      Date  Date    

 

 Macrobid  NDC  47077281066  100 MG Orally      Active  1 capsule



       every 12 hrs        with food

 

 Tylenol with  NDC  04149880427  300-60 MG      Active  1 tablet as



 Codeine #4      Orally every 6        needed



       hrs        

 

 Pantoprazole  NDC  80041656514  40 MG Orally      Active  1 tablet



 Sodium      Once a day        

 

 Citalopram  NDC  05620317270  10 MG Orally      Active  1 tablet



 Hydrobromide      Once a day        

 

 Collagenase  NDC  [address]-96  250 UNIT/GM      Active  1 application



       Externally        to affected



       Once a day        area

 

 Seroquel  NDC  93429084104  25 MG Orally      Active  1 tablet



       Once a day at        



       bedtime        







Results

No Known Results



Summary Purpose

[employer] Submission

## 2019-01-18 NOTE — RAD REPORT
EXAM DESCRIPTION:  CT - Head Brain Wo Cont - 1/18/2019 8:51 pm

 

CLINICAL HISTORY:  Vomiting, frontal headache, shunt tube

 

COMPARISON:  CT head November 8 2018

 

TECHNIQUE:  Axial 5 mm thick images of the head were obtained without IV contrast.

 

All CT scans are performed using dose optimization technique as appropriate and may include automated
 exposure control or mA/KV adjustment according to patient size.

 

FINDINGS:  No intracranial hemorrhage, mass, edema or shift of mid-line structures. No acute infarcti
on changes seen. No abnormal extra-axial fluid collections. Right parietal and right frontal shunt tu
bes are in place with no change in positioning. No hydronephrosis or ventricular enlargement.

 

Mastoid air cells and visualized portions of the paranasal sinuses are clear.

 

No acute bony findings.

 

 

IMPRESSION:  Negative non-contrast CT head examination for acute finding.  No CT evidence for shunt m
alfunction.

## 2019-01-18 NOTE — XMS REPORT
Continuity of Care Document

 Created on:2018



Patient:JORDAN VERDIN JR

Sex:Male

:1985

External Reference #:[id number]





Demographics







 Address  [address] [place] [address] [address] [place] [id number]

 

 Phone  [phone]

 

 Phone  [phone]

 

 Preferred Language  Unknown

 

 Marital Status  Unknown

 

 Spiritism Affiliation  Unknown

 

 Race  Unknown

 

 Ethnic Group  Unknown









Author







 Organization  [facility]









Problems







 Problem  Status  Onset  Classification  Date  Comments  Source



     Date    Reported    



             



             



             

 

 HEADACHE  Active  20        [address]



             

 

 SHUNT  Active  20        [address]



             

 

 ACUTE HEADACHE  Active  20        [address]



             

 

 Pressure ulcer  Active  20  Finding  2018    [facility]



 of right ankle,    18        [facility]



 stage 3            [facility]



             



             

 

 Nausea &  Active  20  Finding  2018    [facility]



 vomiting    18        [facility]



             [place]



             



             

 

 Nausea and  Active  20  Finding  2018    [facility]



 vomiting    18        [facility]



             [place]



             



             

 

 Decubitus ulcer  Active  20  Finding  2018    [facility]



 of right ankle,    18        [facility]



 stage 3            [place]



             



             

 

 Chronic  Active  20  Finding  2018    [facility]



 indwelling Ortega    17        [facility]



 catheter            [place]



             



             

 

 Sacral ulcer  Resolved  20  Finding  2018    [facility]



     17        [facility]



             [place]



             



             

 

 Heel ulcer  Active  20  Finding  2018    [facility]



     17        [facility]



             [place]



             



             

 

 Cellulitis  Resolved  20  Finding  2018    [facility]



     17        [facility]



             [place]



             



             

 

 Lymphedema  Active  20  Finding  2018    [facility]



     17        [facility]



             [place]



             



             

 

 Chronic pain  Active  20  Finding  2018    [facility]



 disorder    17        [facility]



             [place]



             



             

 

 GERD  Active  20  Finding  2018    [facility]



     17        [facility]



             [place]



             



             

 

 Hypertension  Active  20  Finding  2018    [facility]



     17        [facility]



             [place]



             



             

 

 Spina bifida  Active  20  Finding  2018    [facility]



     17        [facility]



             [place]



             



             

 

 Stage II  Resolved  20  Finding  2018    [facility]



 pressure ulcer    16        [facility]



 of buttock            [place]



             



             

 

 Urinary tract  Resolved  07/22/20  Finding  2018    [facility]



 infectious    16        [facility]



 disease            [facility]



             



             

 

 Stage IV  Active  20  Finding  2018    [facility]



 pressure ulcer    16        [facility]



 of heel            [facility]



             



             

 

 Paraplegic  Active  20  Finding  2018    [facility]



 spinal paralysis    16        [facility]



             [facility]



             



             

 

 Malaise  Active  10/16/20  Finding  2018    [facility]



     15        [facility]



             [place]



             



             

 

 Wound of left  Resolved  10/16/20  Finding  2018    [facility]



 ankle    15        [facility]



             [place]



             



             

 

 Leukocytosis  Active  10/16/20  Finding  2018    [facility]



     15        [facility]



             [place]



             



             

 

 Rectal  Resolved  10/16/20  Finding  2018    [facility]



 hemorrhage    15        [facility]



             [place]



             



             

 

 Decubitus ulcer,  Resolved  20  Finding  2018    [facility]



 infected    14        [facility]



             [place]



             



             

 

 Stage III  Active    Finding  2018    [facility]



 pressure ulcer            [facility]



             [place]



             



             

 

 Ulcer of scrotum  Resolved    Finding  2018    [facility]



             [facility]



             [facility]



             



             

 

 Poor social  Active    Finding  2018    [facility]



 situation            [facility]



             [facility]



             



             

 

 Pressure ulcer  Active    Finding  2018    [facility]



 of thigh, stage            [facility]



 3            [facility]



             



             

 

 Stage III  Resolved    Finding  2018    [facility]



 pressure ulcer            [facility]



 of heel            [place]



             



             

 

 Pressure ulcer,  Inactive    Finding  2018    [facility]



 stage 3            [facility]



             [facility]



             



             

 

 Stage II  Resolved    Finding  2018    [facility]



 pressure ulcer            [facility]



 of left ankle            [facility]



             



             

 

 Stage III  Active    Finding  2018    [facility]



 pressure ulcer            [facility]



 of left ankle            [place]



             



             

 

 Stage II  Resolved    Finding  2018    [facility]



 pressure ulcer            [facility]



 of right ankle            [facility]



             



             

 

 History of  Active    Finding  2018    [facility]



 Clostridium            [facility]



 difficile            [facility]



 colitis            



             

 

 Pressure ulcer  Resolved    Finding  2018    [facility]



 of right leg,            [facility]



 stage 2            [facility]



             



             

 

 Pressure ulcer  Active    Finding  2018    [facility]



 of right leg,            [facility]



 stage 3            [place]



             



             

 

 Asymptomatic  Active    Finding  2018    [facility]



 bacteriuria            [facility]



             [facility]



             



             

 

 Ulcer of toe  Resolved    Finding  2018    [facility]



             [facility]



             [facility]



             



             

 

 Incontinence  Active    Finding  2018    [facility]



 without sensory            [facility]



 awareness            [facility]



             



             

 

 Unstageable  Resolved    Finding  2018    [facility]



 pressure sore            [facility]



             [facility]



             



             

 

 Abdominal pain  Inactive    Finding  2018    [facility]



             [facility]



             [facility]



             



             

 

 Cellulitis of  Resolved    [facility]  2018    [facility]



 heel, left            [facility]



             [facility]



             



             

 

 Clostridium  Resolved    Finding  2018    [facility]



 difficile            [facility]



 colitis            [facility]



             



             

 

 Acute pain  Active    Problem  2018    [facility]



             

 

 Asthma  Resolved    Problem  2018    [facility]



             

 

 Bronchitis  Resolved    Problem  2018    [facility]



             

 

 Cerebral palsy  Resolved    Problem  2018    [facility]



             

 

 Headache  Active    Problem  2018    [facility]



             

 

 Hydrocephalus  Resolved    Problem  2018    [facility]



             

 

 Osteomyelitis  Resolved    Problem  2018    [facility] 



             Pearlmary beth -



             Nick,M



             [facility]



             

 

 HEADACHE  Active          [facility]



             







Medications







 Medication  Details  Route  Status  Patient  Ordering  Order  Source



         Instructions  Provider  Date  



               



               



               

 

 Docusate Sodium  100 mg=1 cap,    Active         [place]



 100 MG Oral  PO, BID, 0            [facility]



 Capsule  Refill(s)            [facility]



               



               

 

 Zosyn  0 Refill(s)    Active        [facility] [place]



               [facility]



               



               

 

 celecoxib 200  200 mg=1 cap,    Active      [facility] [place]



 mg oral capsule  PO, BID, 0          2018  [facility]



   Refill(s)            [place]



               



               

 

 ascorbic acid  500 mg=1 tab,    Active      [facility] [place]



   PO, BID, 0            Medical



   Refill(s)            [place]



               



               

 

 acetaminophen  1,000 mg=2 tab,    Active      [facility] [place]



 500 mg oral  PO, Q6Hnow, 0            Medical



 tablet  Refill(s)            [place]



               



               

 

 Oxycodone  5 mg=1 tab, PO,    Active      [facility] [place]



 Hydrochloride 5  Q4H, PRN Pain          2018  [facility]



 MG Oral Tablet  Score 4-6, 0            Center



   Refill(s)            



               



               

 

 zinc sulfate  220 mg=1 cap,    Active      [facility] [place]



 220 mg oral  PO, Daily, 0          2018  [facility]



 capsule  Refill(s)            [place]



               



               

 

 multivitamin  1 tab, PO,    Active      [facility] [place]



   Daily, 0          2018  [facility]



   Refill(s)            [place]



               



               

 

 methocarbamol  1,000 mg=2 tab,    Active      [facility] [place]



 500 mg oral  PO, Q8H, 0          2018  Medical



 tablet  Refill(s)            Center



               



               

 

 LORazepam 0.5  0.5 mg=1 tab,    Active         [place]



 mg oral tablet  PO, Q8H, PRN          2018  [facility]



   Anxiety, 0            Center



   Refill(s)            



               



               

 

 Lidocaine  3 patch, TOP,    Active         [place]



 Hydrochloride  Daily, Remove          2018  [facility]



 0.05 MG/MG  after 12 hours,            Center



 Transdermal  0 Refill(s)            



 Patch              



 [Lidoderm]              



               



               

 

 Robaxin  1,000 mg, 2    No Longer        [facility]



   tab, Route: PO,    Active      2018  [facility]



   Drug form: TAB,            Center



   Q8H, Dosing            



   Weight 127.027,            



   kg, Start date:            



   18            



   16:00:00 CDT,            



   Duration: 30            



   day, Stop date:            



   18            



   8:00:00            



   CDTNotes: (Same            



   as:Robaxin)            



               



               

 

 Oxycodone  10 mg, 2 tab,    No Longer         [place]



 Hydrochloride 5  Route: PO, Drug    Active      2018  [facility]



 MG Oral Tablet  form: TAB,            Center



   Daily, Dosing            



   Weight 127.027,            



   kg, PRN            



   Procedure,            



   Start date:            



   18            



   13:06:00 CDT,            



   Duration: 30            



   day, Stop date:            



   18            



   13:05:00            



   CDTNotes: (Same            



   as: Roxicodone)            



               



               

 

 Ativan  0.5 mg, 1 tab,    No Longer        [facility]



   Route: PO, Drug    Active      2018  [facility]



   form: TAB, Q8H,            Center



   Dosing Weight            



   127.027, kg,            



   PRN Anxiety,            



   Start date:            



   18            



   10:18:00 CDT,            



   Duration: 7            



   day, Stop date:            



   18            



   10:17:00            



   CDTNotes: (Same            



   as: Ativan)            



               



               

 

 Trazodone  50 mg, 1 tab,    No Longer        [facility] [place]



 Hydrochloride  Route: PO, Drug    Active      2018  [facility]



 50 MG Oral  form: TAB,            Center



 Tablet  Bedtime, Dosing            



   Weight 127.027,            



   kg, Start date:            



   18            



   21:00:00 CDT,            



   Duration: 30            



   day, Stop date:            



   18            



   21:00:00            



   CDTNotes: (Same            



   As: Desyrel)            



               



               

 

 remove patch  3 patch, Route:    No Longer         [place]



   TOP, Bedtime,    Active        Medical



   Drug form:            Center



   ERFILM, Start            



   date: 18            



   21:00:00 CDT,            



   Duration: 30            



   day, Stop date:            



   18            



   21:00:00            



   CDTNotes:            



   Remove patch 12            



   hours after            



   application            



   each day.            



               



               

 

 Oxycodone  10 mg, 2 tab,    Inactive        [facility] [place]



 Hydrochloride 5  Route: PO, Drug          2018  Medical



 MG Oral Tablet  form: TAB,            Center



   ONCE, Dosing            



   Weight 127.027,            



   kg, Start date:            



   18            



   17:01:00 CDT,            



   Stop date:            



   18            



   17:01:00            



   CDTNotes: (Same            



   as: Roxicodone)            



               



               

 

 Celebrex  200 mg, 1 cap,    No Longer        [facility]



   Route: PO, Drug    Active      2018  [facility]



   form: CAP, BID,            [facility]



   Dosing Weight            



   127.027, kg,            



   Start date:            



   18            



   17:00:00 CDT,            



   Duration: 30            



   day, Stop date:            



   18            



   9:00:00            



   CDTNotes:            



   NSAID. Please            



   check            



   indication. Not            



   for seizure.            



   (Same As:            



   CeleBREX)            



               



               

 

 Vancomycin  1,500 mg, 250    No Longer         [place]



   mL, Route:    Active      2018  [facility]



   IVPB, Drug            Center



   form: INJ,            



   [id number], Dosing            



   Weight 127.27,            



   kg, Start date:            



   18            



   16:00:00 CDT,            



   Stop date:            



   05/10/18            



   8:00:00 CDT,            



   ABX Indication:            



   Skin/Soft            



   Tissue            



   InfectionNotes:            



   TIME CRITICAL            



   MEDICATION Same            



   as: Vancocin-NS            



   (premixed)            



   Infusion rate            



   2001 mg: infuse            



   over 2.5 hours            



               



               

 

 Lidocaine  3 patch, Route:    No Longer        [facility] [place]



 Hydrochloride  TOP, Daily,    Active        [facility]



 0.05 MG/MG  Drug form:            [facility]



 Transdermal  FILM, Start            



 Patch  date: 18            



 [Lidoderm]  9:00:00 CDT,            



   Duration: 7            



   day, Stop date:            



   18            



   9:00:00 CDT,            



   Remove after 12            



   hoursNotes:            



   Apply only once            



   for up to 12            



   hours in a            



   24-hour period            



   (12 hours on            



   and 12 hours            



   off). (Same as:            



   Lidoderm)            



   "Remove old            



   patch before            



   application of            



   new patch"            



               



               

 

 Phenergan  12.5 mg, 0.5    Inactive         [place]



   mL, Route:          2018  Medical



   IVPB, Drug            Center



   form: INJ,            



   ONCE, Dosing            



   Weight 127.027,            



   kg, Priority:            



   NOW, Start            



   date: 18            



   17:40:00 CDT,            



   Stop date:            



   18            



   17:40:00            



   CDTNotes: Do            



   not give IV            



   push.  (Same            



   as: Phenergan)            



               



               

 

 Dilaudid  0.5 mg, 0.25    Inactive       [place]



   mL, Route: IVP,            Medical



   Drug form: INJ,            Center



   ONCE, Dosing            



   Weight 127.027,            



   kg, Priority:            



   NOW, Start            



   date: 18            



   17:40:00 CDT,            



   Stop date:            



   18            



   17:40:00            



   CDTNotes: Same            



   as Dilaudid            



               



               

 

 Tramadol  100 mg, 2 tab,    No Longer        [facility]



   Route: PO, Drug    Active        Medical



   form: TAB,            Center



   Q6Hnow, Dosing            



   Weight 127.027,            



   kg, Start date:            



   18            



   17:00:00 CDT,            



   Duration: 30            



   day, Stop date:            



   18            



   11:00:00            



   CDTNotes: Not            



   to exceed            



   400mg/day.            



   (Same As:            



   Ultram)            



               

 

 gabapentin  600 mg, 2 cap,    No Longer        [facility]



   Route: PO, Drug    Active        [facility]



   form: CAP,            Center



   Q8Hnow, Dosing            



   Weight 127.027,            



   kg, Start date:            



   18            



   17:00:00 CDT,            



   Stop date:            



   18            



   9:00:00            



   CDTNotes: (Same            



   as: Neurontin)            



               



               

 

 Acetaminophen  1,000 mg, 2    No Longer        [facility]



   tab, Route: PO,    Active        Medical



   Drug form: TAB,            Center



   Q6Hnow, Dosing            



   Weight 127.027,            



   kg, Start date:            



   18            



   17:00:00 CDT,            



   Duration: 30            



   day, Stop date:            



   18            



   11:00:00            



   CDTNotes: Max            



   acetaminophen            



   4000 mg/day (4            



   gm/day).  (Same            



   as: Tylenol            



   Extra Strength)            



               



               

 

 Robaxin  500 mg, 1 tab,    No Longer        [facility]



   Route: PO, Drug    Active        Medical



   form: TAB, TID,            Center



   Dosing Weight            



   127.027, kg,            



   Start date:            



   18            



   17:00:00 CDT,            



   Duration: 30            



   day, Stop date:            



   18            



   13:00:00            



   CDTNotes: (Same            



   as:Robaxin)            



               



               

 

 Oxycodone  5 mg, 1 tab,    No Longer        [facility] [place]



 Hydrochloride 5  Route: PO, Drug    Active      2018  Medical



 MG Oral Tablet  form: TAB, Q4H,            Center



   Dosing Weight            



   127.027, kg,            



   PRN Pain Score            



   4-6, Start            



   date: 18            



   16:33:00 CDT,            



   Duration: 30            



   day, Stop date:            



   18            



   16:32:00            



   CDTNotes: (Same            



   as: Roxicodone)            



               



               

 

 Beneprotein 7  2 pkt, Route:    No Longer         [place]



 gm pkt  PO, Drug Form:    Active      2018  Medical



   PWDR, Dosing            Center



   Weight 127.027,            



   kg, BID-Before            



   Meals, Start            



   date: 18            



   16:30:00 CDT,            



   Duration: 30            



   day, Stop date:            



   18            



   7:30:00            



   CDTNotes: (Same            



   as:            



   Beneprotein)            



               



               

 

 Acetaminophen  1 tab, PO, TID,    No Longer         [place]



 325 MG /  0 Refill(s)    Active      2018  Medical



 Oxycodone              Center



 Hydrochloride              



 10 MG Oral              



 Tablet              



 [Percocet              



 10/325]              



               

 

 Dilaudid  0.5 mg, 0.25    Inactive       [place]



   mL, Route: IVP,          2018  Medical



   Drug form: INJ,            Center



   ONCE, Start            



   date: 18            



   11:01:00 CDT,            



   Stop date:            



   18            



   11:01:00 CDT            



               



               

 

 Phenergan  25 mg, 1 tab,    Inactive        [facility]



   Route: PO, Drug          2018  Medical



   form: TAB, Q6H,            Center



   Dosing Weight            



   127.027, kg,            



   PRN Nausea &            



   Vomiting, Start            



   date: 18            



   10:43:00 CDT,            



   Duration: 30            



   day, Stop date:            



   18            



   10:42:00            



   CDTNotes: (Same            



   as: Phenergan)            



               



               

 

 Dilaudid  2 mg, Route:    Inactive         [place]



   IVP, ONCE,          2018  Medical



   Dosing Weight            Center



   127.027, kg,            



   Priority: STAT,            



   Start date:            



   18            



   10:43:00 CDT,            



   Stop date:            



   18            



   10:43:00 CDT            



               



               

 

 Docusate  100 mg, 1 cap,    No Longer        [facility]



   Route: PO, Drug    Active      2018  Medical



   form: CAP, BID,            Center



   Dosing Weight            



   127.27, kg,            



   Start date:            



   18            



   9:00:00 CDT,            



   Duration: 30            



   day, Stop date:            



   18            



   17:00:00            



   CDTNotes: (Same            



   as: Colace) (Do            



   Not Crush)            



               



               

 

 Zinc Sulfate  220 mg, 1 cap,    No Longer       [place]



   Route: PO, Drug    Active      2018  Medical



   form: CAP,            Center



   Daily, Dosing            



   Weight 127.27,            



   kg, Start date:            



   18            



   9:00:00 CDT,            



   Duration: 30            



   day, Stop date:            



   18            



   9:00:00            



   CDTNotes: (Zinc            



   sulfate            



   capsule) - 220            



   mg Zinc            



   sulfate=50 mg            



   elemental zinc            



   Same as Zinc            



   Sulfate            



               

 

 ascorbic acid  500 mg, 1 tab,    No Longer       [place]



   Route: PO, Drug    Active        Medical



   form: TAB, BID,            Center



   Dosing Weight            



   127.27, kg,            



   Start date:            



   18            



   9:00:00 CDT,            



   Duration: 30            



   day, Stop date:            



   18            



   17:00:00            



   CDTNotes: (Same            



   as: Vitamin C)            



               



               

 

 multivitamin  1 tab, Route:    No Longer       [place]



   PO, Drug Form:    Active      2018  Medical



   TAB, Dosing            Center



   Weight 127.27,            



   kg, Daily,            



   Start date:            



   18            



   9:00:00 CDT,            



   Duration: 30            



   day, Stop date:            



   18            



   9:00:00            



   CDTNotes: (Same            



   as:Thera)            



   WASTE: F/P -            



   Black; E -            



   Municipal Trash            



   Bin  Take with            



   food.            



               

 

 Naproxen  500 mg, 1 tab,    Inactive       [place]



   Route: PO, Drug            Medical



   form: TAB,            Center



   [id number], Dosing            



   Weight 127.27,            



   kg, Start date:            



   18            



   2:00:00 CDT,            



   Duration: 30            



   day, Stop date:            



   18            



   14:00:00            



   CDTNotes: (Same            



   as: Naprosyn)            



   Take with food.            



               



               

 

 Zosyn  3.375 gm,    No Longer        [facility]



   Route: IVPB,    Active      2018  Medical



   Drug form:            Center



   PDR/INJ,            



   [id number], Dosing            



   Weight 127.27,            



   kg, Start date:            



   18            



   2:00:00 CDT,            



   Stop date:            



   05/10/18            



   10:00:00 CDT,            



   ABX Indication:            



   Skin/Soft            



   Tissue            



   InfectionNotes:            



   (Same as:            



   Zosyn) Dosing            



   based on            



   Piperacillin            



   component   ***            



   MEDICATION            



   WASTE ***            



   Product Size:            



   3375 mg Product            



   Wasted:  ___ mg            



               



               

 

 Vancomycin  1,000 mg,    No Longer         [place]



   Route: IVPB,    Active        Medical



   Drug form: INJ,            Center



   ABXQ8H, Dosing            



   Weight 127.27,            



   kg, Start date:            



   18            



   2:00:00 CDT,            



   Duration: 7            



   day, Stop date:            



   05/10/18            



   18:00:00 CDT,            



   ABX Indication:            



   Skin/Soft            



   Tissue            



   InfectionNotes:            



   TIME CRITICAL            



   MEDICATION            



   (Same As:            



   Vancocin)            



   Infusion rate            



   2001 mg: infuse            



   over 2.5 hours            



   For adult            



   patients only:            



   Round to            



   nearest 250 mg            



   per Medical            



   Staff approval            



    *** MEDICATION            



   WASTE ***            



   Product Size:            



   1000 mg Product            



   Wasted:  ___ mg            



               



               

 

 Enoxaparin  40 mg, 0.4 mL,    No Longer         [place]



   Route: SUB-Q,    Active        Medical



   Drug form: INJ,            Center



   [id number],            



   Dosing Weight            



   127.27, kg,            



   Consider for            



   obese patients,            



   Start date:            



   18            



   2:00:00 CDT,            



   Stop date:            



   18            



   14:00:00            



   CDTNotes: (Same            



   as: Lovenox)            



               



               

 

 Sodium Chloride  1,000 mL, Rate:    No Longer       [place]



 0.9% IV 1,000  125 ml/hr,    Active        Medical



 mL  Infuse over: 8            Center



   hr, Route: IV,            



   Dosing Weight            



   127.27 kg,            



   Total Volume:            



   1,000, Start            



   date: 18            



   1:46:00 CDT,            



   Duration: 30            



   day, Stop date:            



   18            



   1:45:00 CDT,            



   2.44, m2            



               

 

 Saline Flush  10 ml, Route:    No Longer         [place]



 0.9%  IVP, Drug Form:    Active        Medical



   INJ, Dosing            Center



   Weight 127.27,            



   kg, PRN, PRN            



   Line Flush,            



   Start date:            



   18            



   1:46:00 CDT,            



   Duration: 30            



   day, Stop date:            



   18            



   1:45:00            



   CDTNotes: (Same            



   as: BD            



   Posiflush)            



               



               

 

 Acetaminophen  325 mg, 1 tab,    Inactive         [place]



   Route: PO, Drug          2018  Medical



   form: TAB, Q4H,            Center



   Dosing Weight            



   127.27, kg, PRN            



   Pain Score 4-6,            



   Start date:            



   18            



   1:46:00 CDT,            



   Duration: 30            



   day, Stop date:            



   18            



   1:45:00            



   CDTNotes: Do            



   not exceed 4            



   gm/day.  (Same            



   as: Tylenol)            



               



               

 

 Acetaminophen  1 tab, Route:    Inactive         [place]



 325 MG /  PO, Drug Form:          2018  Medical



 Hydrocodone  TAB, Dosing            Center



 Bitartrate 5 MG  Weight 127.27,            



 Oral Tablet  kg, Q4H, PRN            



   Pain Score 4-6,            



   Start date:            



   18            



   1:46:00 CDT,            



   Duration: 30            



   day, Stop date:            



   18            



   1:45:00            



   CDTNotes: (Same            



   as: Norco            



   325/5)  Do not            



   exceed 4gm/day            



   of            



   acetaminophen.            



               



               

 

 Reglan  10 mg, 2 mL,    Inactive         [place]



   Route: IVP,            Medical



   Drug form: INJ,            Center



   ONCE, Dosing            



   Weight 127.273,            



   kg, Priority:            



   STAT, Start            



   date: 18            



   23:27:00 CDT,            



   Stop date:            



   18            



   23:27:00            



   CDTNotes: (Same            



   as: Reglan)            



               



               

 

 Benadryl  25 mg, 0.5 mL,    Inactive         [place]



   Route: IVP,          2018  Medical



   Drug form: INJ,            Center



   ONCE, Dosing            



   Weight 127.273,            



   kg, Priority:            



   STAT, Start            



   date: 18            



   23:27:00 CDT,            



   Stop date:            



   18            



   23:27:00            



   CDTNotes: (Same            



   as: Benadryl)            



               



               

 

 Magnesium  2 gm, 50 mL,    Inactive         [place]



 Sulfate  Route: IV, Drug            Medical



   form: INJ,            Center



   ONCE, Dosing            



   Weight 127.273,            



   kg, Priority:            



   STAT, Start            



   date: 18            



   23:26:00 CDT,            



   Stop date:            



   18            



   23:26:00            



   CDTNotes:            



   WASTE: F/P -            



   Sink; E -            



   Municipal Trash            



   Bin            



               

 

 Sodium Chloride  1,000 mL, 1000    Inactive        [facility] [place]



 0.9% (Bolus) IV  ml/hr, Infuse          2018  Medical



   Over: 1 hr,            Center



   Route: IV,            



   1,000, Drug            



   form: INJ,            



   ONCE, Priority:            



   STAT, Dosing            



   Weight 127.273            



   kg, Start date:            



   18            



   23:26:00 CDT,            



   Stop date:            



   18            



   23:26:00 CDT            



               



               

 

 Zosyn  4.5 gm, Route:    Inactive         [place]



   IVPB, ONCE,          2018  Medical



   Dosing Weight            Center



   127.273, kg,            



   Priority: STAT,            



   Start date:            



   18            



   23:10:00 CDT,            



   Stop date:            



   18            



   23:10:00 CDT,            



   ABX Indication:            



   Bacteremia            



               



               

 

 Vancomycin  2,000 mg,    Inactive         [place]



   Route: IVPB,          2018  Medical



   ONCE, Dosing            Center



   Weight 127.273,            



   kg, Priority:            



   STAT, Start            



   date: 18            



   23:10:00 CDT,            



   Stop date:            



   18            



   23:10:00 CDT,            



   ABX Indication:            



   BacteremiaNotes            



   : TIME CRITICAL            



   MEDICATION            



   (Same As:            



   Vancocin)            



   Infusion rate            



   2001 mg: infuse            



   over 2.5 hours            



   For adult            



   patients only:            



   Round to            



   nearest 250 mg            



   per Medical            



   Staff approval            



    *** MEDICATION            



   WASTE ***            



   Product Size:            



   1000 mg Product            



   Wasted:  ___ mg            



               



               

 

 normal saline  1,000 mL, Rate:    No Longer       [place]



 0.9% IV 1,000  75 ml/hr,    Active      2018  Medical



 mL  Infuse over:            Center



   13.3 hr, Route:            



   IV, Dosing            



   Weight 127.273            



   kg, Total            



   Volume: 1,000,            



   Start date:            



   18            



   22:39:00 CDT,            



   Duration: 30            



   day, Stop date:            



   18            



   22:38:00 CDT,            



   2.36, m2            



               

 

 Acetaminophen  1,000 mg, 2    Inactive         [place]



   tab, Route: PO,          2018  Medical



   Drug form: TAB,            Center



   ONCE, Dosing            



   Weight 127.273,            



   kg, Start date:            



   18            



   21:56:00 CDT,            



   Stop date:            



   18            



   21:56:00            



   CDTNotes: Max            



   acetaminophen            



   4000 mg/day (4            



   gm/day).  (Same            



   as: Tylenol            



   Extra Strength)            



               



               

 

 Rocephin  1 gm, Route:    Inactive      05/04[facility]



   IVP, Drug form:          2018  Medical



   PDR/INJ, ONCE,            Center



   Dosing Weight            



   127.273, kg,            



   Priority: STAT,            



   Start date:            



   18            



   21:21:00 CDT,            



   Stop date:            



   18            



   21:21:00 CDT,            



   ABX Indication:            



   Urinary Tract            



   InfectionNotes:            



   (Same As:            



   Rocephin).            



   *** MEDICATION            



   WASTE ***            



   Product Size:            



   1000 mg Product            



   Wasted:  _0__            



   mg            



               

 

 Morphine  4 mg, Route:    Inactive        [facility]



   IVP, ONCE,          2018  Medical



   Dosing Weight            Center



   127.273, kg,            



   Priority: STAT,            



   Start date:            



   18            



   19:05:00 CDT,            



   Stop date:            



   18            



   19:05:00 CDT            



               



               

 

 Benadryl  25 mg, 0.5 mL,    Inactive        [facility]



   Route: IVP,            Medical



   Drug form: INJ,            Center



   ONCE, Dosing            



   Weight 127.273,            



   kg, Priority:            



   STAT, Start            



   date: 18            



   18:14:00 CDT,            



   Stop date:            



   18            



   18:14:00            



   CDTNotes: (Same            



   as: Benadryl)            



               



               

 

 Benadryl  50 mg, 2 cap,    Inactive      [facility]



   Route: PO, Drug            Medical



   form: CAP,            Center



   ONCE, Dosing            



   Weight 127.273,            



   kg, Priority:            



   STAT, Start            



   date: 18            



   17:56:00 CDT,            



   Stop date:            



   18            



   17:56:00            



   CDTNotes: (Same            



   as: Benadryl)            



               



               

 

 Morphine  4 mg, 1 mL,    Inactive      [facility]



   Route: IVP,            Medical



   Drug form:            Center



   SOLN, ONCE,            



   Dosing Weight            



   127.273, kg,            



   Priority: STAT,            



   Start date:            



   18            



   17:56:00 CDT,            



   Stop date:            



   18            



   17:56:00 CDT            



               



               

 

 Pantoprazole  DAILY AT 0630    Active    [facility]



             2016  [facility]



               [place]



               



               

 

 Metronidazole  Q8H    Active    [facility]



             2016  [facility]



               [place]



               



               

 

 Codeine/Apap  EVERY 6 HOURS    Active    [facility]



   AS NEEDED PRN            [facility]



   [facility]            [place]



               



               

 

 Oxycodone  FOUR TIMES    Active        [facility]



 Hcl/Acetaminoph  DAILY          2016  [facility]



 [facility]              [place]



               



               

 

 Meropenem  Q8H    Active        [facility]



               [facility]



               [place]



               



               

 

 Collagenase  DAILY    Active    Granda    [facility]



               [facility]



               [place]



               



               

 

 Ciprofloxacin  Q12H    Active    Todd  09/10/  [facility]



 400mg Iv              [facility]



               [place]



               



               

 

 Amikacin Sulf  DAILY    Active    Todd  09/10/  [facility]



               [facility]



               [place]



               



               

 

 Linezolid  TWICE DAILY    Inactive    Granda    [facility]



               [facility]



               [place]



               



               







Allergies, Adverse Reactions, Alerts







 Substance  Category  Reaction  Severity  Reaction  Status  Date  Comments  
Source



         type    Reported    



                 



                 



                 

 

 levofloxacin    Hives  Moderate  Allergy to  Active      [facility]



         Substance    8    [facility]



                 t



                 



                 

 

 sulfamethoxa    Hives  Moderate  Allergy to  Active      [facility]



 zole        Substance    8    [facility]



                 t



                 



                 

 

 ketorolac    Nausea/Vo    Allergy to  Active      [facility]



 tromethamine    miting    Substance    8    [facility]



                 t



                 



                 

 

 vancomycin    Hives    Allergy to  Active      [facility]



         Substance    8    [facility]



                 t



                 



                 

 

 ondansetron    Nausea/Vo  Mild  Propensity  Active      [facility]



     miting    to adverse    8    [facility]



         [facility]



                 [facility]



                 



                 

 

 ciprofloxaci    Nausea/Vo    Propensity  Active      [facility]



 n    miting    to adverse    8    [facility]



         [facility]



                 [facility]



                 



                 

 

 doxycycline    Nausea/Vo    Propensity  Active      [facility]



     miting    to adverse    8    [facility]



         [facility]



                 t



                 



                 

 

 morphine  Assertion      Drug  Active      [facility]



                 



                 

 

 amoxicillin  Assertion      Drug  Active      [facility]



                 



                 

 

 Toradol  Assertion      Drug  Active      [facility]



                 



                 

 

 Minocin  Assertion      Drug  Active      [facility]



                 



                 

 

 Zofran  Assertion      Drug  Active      [facility]



                 



                 

 

 Levaquin  Assertion      Drug  Active      [facility]



                 



                 

 

 Bactrim  Assertion      Drug  Active      [facility]



                 



                 







Immunizations







 Immunization  Date Given  Site  Status  Last Updated  Comments  Source



             



             







Results







 Order Name  Results  Value  Reference  Date  Interpretation  Comments  Source



       [facility]        



               



               



               

 

 Brain-Outs  Brain-Outsid  EXAM: CT BRAIN WITHOUT CONTRAST -- OUTSIDE CONSULT  
      -  [facility]



 jonathan  e Consult     -  Medical



 Consult CT            This report was dictated by a Radiology Resident/Fellow.
  I have personally reviewed the images as  Center



             well as the Resident's interpretation and agree with the findings.
  



     DATE: 2018 4:49 AM CST        Read by:  Mauricio Cortes MD  
        Resident:  Mauricio Cortes MD  



             Dictated Date/time:  18 04:58  



             Electronically Signed by:  Radha Addison MD              
   18 07:46  



             FINAL REPORT  



     INDICATION: " - PAIN"          



               



               



               



     COMPARISON: Noncontrast head CTs 2018, 2015, 2013       
   



               



               



               



     TECHNIQUE: Noncontrast outside hospital CT submitted for 2nd 
interpretation. 205 images. Imaging was performed at [facility] on 2018.          



               



     IV contrast: None.          



               



               



               



     FINDINGS:          



               



               



               



     Overall unchanged exam. Right transfrontal ventriculostomy catheter is 
unchanged in position. 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 
size and configuration. The abandoned right transparie          



     hudson catheter is unchanged. Stigmata of Chiari II malformation.          



               



               



               



     There is no intracranial hemorrhage or extra-axial collection.          



               



               



               



     No new parenchymal density abnormality. No mass or mass effect. Unchanged 
from the tonsillar herniation.          



               



               



               



     The density of the larger intracranial sinuses is normal.          



               



               



               



               



               



     IMPRESSION: Unchanged examination compared to 2018          



               



               



               



     The final report agrees with the preliminary report by the radiology 
resident.          



               



               



               



               

 

 CHEM PANEL  B/C Ratio  17  6 - 25        [address]



               



               



               

 

 CHEM PANEL  Globulin  4.3 g/dL  2.7 - 4.2        [address]



               



               



               

 

 CHEM PANEL  A/G Ratio  0.7  0.7 - 1.6        [address]



               



               



               

 

 CHEM PANEL  AGAP  14.4 meq/L  10.0 -        [facility]



       20.0        [facility]



               



               



               

 

 CHEM PANEL  eGFR  113        Result Comment: The eGFR is calculated using 
the CKD-EPI formula. In most young, healthy individuals the eGFR will be >90 mL/
min/1.73m2. The eGFR declines with age. An eGFR of 60-89 may be normal in  [facility] 
[place]



     mL/min/1.7        some populations, particularly the elderly, for 
whom the CKD-EPI formula has not been extensively validated. Use of the eGFR is 
not recommended in the following populations:  [address]



             Individuals with unstable creatinine concentrations, including 
pregnant patients and those with serious co-morbid conditions.  



               



             Patients with extremes in muscle mass or diet.  



               



             The data above are obtained from the National Kidney Disease 
Education Program (NKDEP) which additionally recommends that when the eGFR is 
used in patients with extremes of body mass index for purposes  



             of drug dosing, the eGFR should be multiplied by the estimated 
BMI.  

 

 CHEM PANEL  Alk Phos  76 unit/L  39 - 136  05/      [address]



               



               



               

 

 CHEM PANEL  ALT  35 unit/L  0 - 65  /      [address]



               



               



               

 

 CHEM PANEL  Albumin Lvl  2.8 g/dL  3.5 - 5.0  /      [address]



               



               



               

 

 CHEM PANEL  Total  7.1 g/dL  6.4 - 8.4        [facility]



   Protein      [address]



               



               



               

 

 CHEM PANEL  Calcium Lvl  8.7 mg/dL  8.5 - 10.5        [address]



               



               



               

 

 CHEM PANEL  AST  18 unit/L  0 - 37  /      [address]



               



               



               

 

 CHEM PANEL  Bili Total  0.3 mg/dL  0.2 - 1.3        [address]



               



               



               

 

 CHEM PANEL  Potassium  4.4 meq/L  3.5 - 5.1        [facility]      [address] [place] [address]



               



               



               

 

 CHEM PANEL  Chloride Lvl  109 meq/L  95 - 109        [address]



               



               



               

 

 CHEM PANEL  CO2  23 meq/L  24 - 32  /      [address]



               



               



               

 

 CHEM PANEL  Glucose Lvl  114 mg/dL  70 - 99        [address]



               



               



               

 

 CHEM PANEL  Creatinine  1.01 mg/dL  0.50 -        [facility]    1.40  /[address] [place] [address]



               



               



               

 

 CHEM PANEL  BUN  17 mg/dL  7 - 22        [address]



               



               



               

 

 CHEM PANEL  Sodium Lvl  142 meq/L  135 - 145        [address]



               



               



               

 

 HEMATOLOGY  Basophils  0.6 %  0.0 - 1.0  /      [address]



               



               



               

 

 HEMATOLOGY  Segs-Bands #  4.8 K/CMM  1.5 - 8.1        [address]



               



               



               

 

 HEMATOLOGY  Monocytes #  0.7 K/CMM  0.0 - 0.8  /      [address]



               



               



               

 

 HEMATOLOGY  Lymphocytes  1.9 K/CMM  1.0 - 5.5  /      [address]



               



               



               

 

 HEMATOLOGY  Monocytes  9.4 %  2.0 - 12.0  /      [address]



               



               



               

 

 HEMATOLOGY  Eosinophils  0.2 K/CMM  0.0 - 0.5        [facility]



   #      /      [facility]



               



               



               

 

 HEMATOLOGY  Eosinophils  2.9 %  0.0 - 4.0         [place]



         /2018      [facility]



               



               



               

 

 HEMATOLOGY  Segs  62.2 %  45.0 -         [place]



       75.0        [facility]



               



               



               

 

 HEMATOLOGY  Lymphocytes  24.9 %  20.0 -         [place]



       40.0        [facility]



               



               



               

 

 HEMATOLOGY  MCH  27.5 pg  27.0 -         [place]



       31.0        [facility]



               



               



               

 

 HEMATOLOGY  MCV  85.3 fL  80.0 -         [place]



       94.0        [facility]



               



               



               

 

 HEMATOLOGY  Hct  43.9 %  42.0 -         [place]



       54.0        [facility]



               



               



               

 

 HEMATOLOGY  Hgb  14.2 g/dL  14.0 -         [place]



       18.0        [facility]



               



               



               

 

 HEMATOLOGY  WBC  7.7 K/CMM  3.7 - 10.4         [place]



         /2018      [facility]



               



               



               

 

 HEMATOLOGY  RBC  5.15 M/CMM  4.70 -         [place]



       6.10        [facility]



               



               



               

 

 HEMATOLOGY  MPV  8.4 fL  7.4 - 10.4         [place]



         /2018      [facility]



               



               



               

 

 HEMATOLOGY  MCHC  32.3 g/dL  32.0 -         [place]



       36.0        [facility]



               



               



               

 

 HEMATOLOGY  RDW  17.3 %  11.5 -         [place]



       14.5        [facility]



               



               



               

 

 HEMATOLOGY  Platelet  317 K/CMM  133 - 450         [place]



         [address]



               



               



               

 

 CHEM PANEL  Globulin  4.4 g/dL  2.7 - 4.2         [place]



               [facility]



               



               



               

 

 CHEM PANEL  A/G Ratio  0.6  0.7 - 1.6         [place]



         /2018      [facility]



               



               



               

 

 CHEM PANEL  B/C Ratio  17  6 - 25         [place]



         /2018      [facility]



               



               



               

 

 CHEM PANEL  AGAP  11.3 meq/L  10.0 -         [place]



       20.0        [facility]



               



               



               

 

 CHEM PANEL  eGFR  134        Result Comment: The eGFR is calculated using 
the CKD-EPI formula. In most young, healthy individuals the eGFR will be >90 mL/
min/1.73m2. The eGFR declines with age. An eGFR of 60-89 may be normal in  [facility] 
[place]



     mL/min/1.7        some populations, particularly the elderly, for 
whom the CKD-EPI formula has not been extensively validated. Use of the eGFR is 
not recommended in the following populations:  [address]



             Individuals with unstable creatinine concentrations, including 
pregnant patients and those with serious co-morbid conditions.  



               



             Patients with extremes in muscle mass or diet.  



               



             The data above are obtained from the National Kidney Disease 
Education Program (NKDEP) which additionally recommends that when the eGFR is 
used in patients with extremes of body mass index for purposes  



             of drug dosing, the eGFR should be multiplied by the estimated 
BMI.  

 

 CHEM PANEL  Creatinine  0.84 mg/dL  0.50 -        [facility]



   Lvl    1.40        [facility]



               



               



               

 

 CHEM PANEL  Sodium Lvl  142 meq/L  135 - 145        [address]



               



               



               

 

 CHEM PANEL  Glucose Lvl  99 mg/dL  70 - 99        [address]



               



               



               

 

 CHEM PANEL  BUN  14 mg/dL  7 - 22        [address]



               



               



               

 

 CHEM PANEL  Alk Phos  79 unit/L  39 - 136        [address]



               



               



               

 

 CHEM PANEL  Bili Total  0.3 mg/dL  0.2 - 1.3        [address]



               



               



               

 

 CHEM PANEL  AST  14 unit/L  0 - 37        [address]



               



               



               

 

 CHEM PANEL  ALT  43 unit/L  0 - 65        [address]



               



               



               

 

 CHEM PANEL  Total  7.1 g/dL  6.4 - 8.4        [facility]



   Protein      [address]



               



               



               

 

 CHEM PANEL  Albumin Lvl  2.7 g/dL  3.5 - 5.0        [address]



               



               



               

 

 CHEM PANEL  Calcium Lvl  9.2 mg/dL  8.5 - 10.5        [address]



               



               



               

 

 CHEM PANEL  CO2  21 meq/L  24 - 32        [address]



               



               



               

 

 CHEM PANEL  Potassium  4.3 meq/L  3.5 - 5.1        [facility]



   Lvl      [address] [place] [address]



               



               



               

 

 CHEM PANEL  Chloride Lvl  114 meq/L  95 - 109        [address]



               



               



               

 

 HEMATOLOGY  MCHC  32.5 g/dL  32.0 -        [facility]



       36.0        [facility]



               



               



               

 

 HEMATOLOGY  RDW  17.5 %  11.5 -        [facility]



       14.5        [facility]



               



               



               

 

 HEMATOLOGY  Platelet  400 K/CMM  133 - 450        [address]



               



               



               

 

 HEMATOLOGY  MPV  8.5 fL  7.4 - 10.4        [facility] [place]



         /[address] [place] [address]



               



               



               

 

 HEMATOLOGY  WBC  6.6 K/CMM  3.7 - 10.4         [place]



         /[address] [place] [address]



               



               



               

 

 HEMATOLOGY  RBC  5.17 M/CMM  4.70 -        [facility]



       6.10        [facility]



               



               



               

 

 HEMATOLOGY  MCV  86.1 fL  80.0 -        [facility]



       94.0        [facility]



               



               



               

 

 HEMATOLOGY  Hct  44.5 %  42.0 -        [facility]



       54.0        [facility]



               



               



               

 

 HEMATOLOGY  MCH  28.0 pg  27.0 -        [facility]



       31.0        [facility]



               



               



               

 

 HEMATOLOGY  Hgb  14.5 g/dL  14.0 -        [facility]



       18.0        [facility]



               



               



               

 

 HEMATOLOGY  Lymphocytes  1.7 K/CMM  1.0 - 5.5        [facility]            [facility]



               



               



               

 

 HEMATOLOGY  Monocytes #  0.7 K/CMM  0.0 - 0.8        [address]



               



               



               

 

 HEMATOLOGY  Eosinophils  0.2 K/CMM  0.0 - 0.5        [facility]            [facility]



               



               



               

 

 HEMATOLOGY  Lymphocytes  26.1 %  20.0 -        [facility]



       40.0        [facility]



               



               



               

 

 HEMATOLOGY  Segs  59.3 %  45.0 -        [facility]



       75.0        [facility]



               



               



               

 

 HEMATOLOGY  Basophils  0.8 %  0.0 - 1.0        [address]



               



               



               

 

 HEMATOLOGY  Monocytes  10.5 %  2.0 - 12.0        [address]



               



               



               

 

 HEMATOLOGY  Eosinophils  3.3 %  0.0 - 4.0        [address]



               



               



               

 

 HEMATOLOGY  Segs-Bands #  3.9 K/CMM  1.5 - 8.1        [address]



               



               



               

 

 TOXICOLOGY  Vanco Tr TND  15:30pm          [address]



               



               



               

 

 TOXICOLOGY  Vanco Tr  9.1 ug/ml          [address]



               



               



               

 

 CHEM PANEL  Glucose Lvl  74 mg/dL  70 - 99        [address]



               



               



               

 

 CHEM PANEL  BUN  12 mg/dL  7 - 22        [address]



               



               



               

 

 CHEM PANEL  Creatinine  0.75 mg/dL  0.50 -        [facility]



   Lvl    1.40        [facility]



               



               



               

 

 CHEM PANEL  eGFR  140        Result Comment: The eGFR is calculated using 
the CKD-EPI formula. In most young, healthy individuals the eGFR will be >90 mL/
min/1.73m2. The eGFR declines with age. An eGFR of 60-89 may be normal in  [facility] 
[place]



     mL/min/1.    some populations, particularly the elderly, for 
whom the CKD-EPI formula has not been extensively validated. Use of the eGFR is 
not recommended in the following populations:  [address]



             Individuals with unstable creatinine concentrations, including 
pregnant patients and those with serious co-morbid conditions.  



               



             Patients with extremes in muscle mass or diet.  



               



             The data above are obtained from the National Kidney Disease 
Education Program (NKDEP) which additionally recommends that when the eGFR is 
used in patients with extremes of body mass index for purposes  



             of drug dosing, the eGFR should be multiplied by the estimated 
BMI.  

 

 CHEM PANEL  Albumin Lvl  2.9 g/dL  3.5 - 5.0        [address]



               



               



               

 

 CHEM PANEL  Globulin  4.4 g/dL  2.7 - 4.2        [address]



               



               



               

 

 CHEM PANEL  A/G Ratio  0.7  0.7 - 1.6        [address]



               



               



               

 

 CHEM PANEL  Bili Total  0.4 mg/dL  0.2 - 1.3        [address]



               



               



               

 

 CHEM PANEL  Alk Phos  71 unit/L  39 - 136        [address]



               



               



               

 

 CHEM PANEL  AST  15 unit/L  0 - 37        [address]



               



               



               

 

 CHEM PANEL  ALT  28 unit/L  0 - 65        [address]



               



               



               

 

 CHEM PANEL  Potassium  4.4 meq/L  3.5 - 5.1        [facility]      [address] [place] [address]



               



               



               

 

 CHEM PANEL  Sodium Lvl  147 meq/L  135 - 145        [address]



               



               



               

 

 CHEM PANEL  CO2  25 meq/L  24 - 32        [address]



               



               



               

 

 CHEM PANEL  Chloride Lvl  114 meq/L  95 - 109  /      [address]



               



               



               

 

 CHEM PANEL  B/C Ratio  16  6 - 25        [address]



               



               



               

 

 CHEM PANEL  Calcium Lvl  8.7 mg/dL  8.5 - 10.5        [address]



               



               



               

 

 CHEM PANEL  AGAP  12.4 meq/L  10.0 -  05/      [facility]



       20.0        [facility]



               



               



               

 

 CHEM PANEL  Total  7.3 g/dL  6.4 - 8.4        [facility] [place]



   Protein      [address]



               



               



               

 

 HEMATOLOGY  Platelet  345 K/CMM  133 - 450  05       [place]



         /2018      [facility]



               



               



               

 

 HEMATOLOGY  MPV  8.7 fL  7.4 - 10.4         [place]



         /2018      [facility]



               



               



               

 

 HEMATOLOGY  WBC  6.9 K/CMM  3.7 - 10.4         [place]



         /2018      [facility]



               



               



               

 

 HEMATOLOGY  RBC  5.30 M/CMM  4.70 -         [place]



       6.10        [facility]



               



               



               

 

 HEMATOLOGY  MCHC  32.8 g/dL  32.0 -         [place]



       36.0        [facility]



               



               



               

 

 HEMATOLOGY  MCH  27.9 pg  27.0 -         [place]



       31.0        [facility]



               



               



               

 

 HEMATOLOGY  Hgb  14.8 g/dL  14.0 -         [place]



       18.0        [facility]



               



               



               

 

 HEMATOLOGY  MCV  85.0 fL  80.0 -        [facility]



       94.0        [facility]



               



               



               

 

 HEMATOLOGY  Hct  45.1 %  42.0 -         [place]



       54.0        [facility]



               



               



               

 

 HEMATOLOGY  RDW  17.5 %  11.5 -         [place]



       14.5        [facility]



               



               



               

 

 HEMATOLOGY  Eosinophils  0.2 K/CMM  0.0 - 0.5        [facility]



   #            [facility]



               



               



               

 

 HEMATOLOGY  Lymphocytes  2.0 K/CMM  1.0 - 5.5        [facility]



         [facility]



               



               



               

 

 HEMATOLOGY  Monocytes #  0.7 K/CMM  0.0 - 0.8  05       [place]



         /2018      [facility]



               



               



               

 

 HEMATOLOGY  Eosinophils  2.4 %  0.0 - 4.0         [place]



         /2018      [facility]



               



               



               

 

 HEMATOLOGY  Basophils  0.6 %  0.0 - 1.0         [place]



         /[address] [place] [address]



               



               



               

 

 HEMATOLOGY  Segs-Bands #  4.0 K/CMM  1.5 - 8.1         [place]



         /2018      [facility]



               



               



               

 

 HEMATOLOGY  Monocytes  10.4 %  2.0 - 12.0         [place]



         /2018      [facility]



               



               



               

 

 HEMATOLOGY  RBC Morph  Normal           [place]



         /2018      [facility]



     (18 2:07 AM)          [place]



               



               



               

 

 HEMATOLOGY  Segs  58.1 %  45.0 -         [place]



       75.0        [facility]



               



               



               

 

 HEMATOLOGY  Plt Morph  Normal           [place]



         /2018      [facility]



     (18 2:07 AM)          [place]



               



               



               

 

 HEMATOLOGY  Lymphocytes  28.5 %  20.0 -         [place]



       40.0        [facility]



               



               



               

 

 HEMATOLOGY  Basophils #  0.1 K/CMM  0.0 - 0.2        [facility]



               [facility]



               



               



               

 

 HEMATOLOGY  Polychrom  Moderate  None Seen        [facility]



               Medical



     *ABN*          [place]



               



     (18 11:07 AM)          



               

 

 TOXICOLOGY  Vanco Tr TND  *          [address]



               



               



               

 

 TOXICOLOGY  Vanco Tr  22.3 ug/ml          [address]



               



               



               

 

 CHEM PANEL  Magnesium  2.3 mg/dL  1.8 - 2.4        [facility]            [facility]



               



               



               

 

 CHEM PANEL  Phosphorus  3.5 mg/dL  2.5 - 4.5        [address]



               



               



               

 

 HEMATOLOGY  PT  14.0 s  12.0 -        [facility]



       14.      [facility]



               



               



               

 

 HEMATOLOGY  PTT  37.6 s  22.9 -        [facility]



       35.      [facility]



               



               



               

 

 HEMATOLOGY  INR  1.08  0.85 -        [facility]



       1.      [facility]



               



               



               

 

 IMMUNOLOGY  C-REACTIVE  13.1 mg/L  <=2.9 mg/L        [facility]



   PROTEIN            [facility]



               



               



               

 

 IMMUNOLOGY  Prealbumin  25.2 mg/dL  18.0 -        [facility]



       45.0        [facility]



               



               



               

 

 CHEM PANEL  Lactic Acid  0.9 mMol/L  0.5 - 2.2        [facility]            [facility]



               



               



               

 

 HEMATOLOGY  Sed Rate  5 mm/h  0 - 15        [address]



               



               



               

 

 IMMUNOLOGY  C-REACTIVE  15.8 mg/L  <=2.9 mg/L        [facility]



   PROTEIN      [address]



               



               



               

 

 HEMATOLOGY  PT  13.0 s  12.0 -        [facility]



       14.      [facility]



               



               



               

 

 HEMATOLOGY  INR  0.98  0.85 -        [facility]



       1.      [facility]



               



               



               

 

 HEMATOLOGY  PTT  33.2 s  22.9 -        [facility]



       35.      [facility]



               



               



               

 

 BLOOD BANK  Antibody  Negative          [facility]



 RESULTS  Scrn            [facility]



     (5/3/18 6:51 PM)          [place]



               



               



               

 

 BLOOD BANK  ABO/Rh  AB POS          [facility]



 RESULTS        [address] [place] [address]



               



               



               

 

 URINE AND  UA  0.2 EU/dL  0.1 - 1.0        [facility]



 STOOL  Urobilinogen      /[address] [place] [address]



               



               



               

 

 URINE AND  UA Nitrite  Negative  Negative        [facility]



 STOOL              [facility]



     (5/3/18 6:35 PM)          [place]



               



               



               

 

 URINE AND  UA Glucose  Negative  Negative        [facility]              [facility]



     (5/3/18 6:35 PM)          [place]



               



               



               

 

 URINE AND  UA Ketones  Negative  Negative        [place] [address]      Medical



     *NA*          Center



               



     (5/3/18 6:35 PM)          



               

 

 URINE AND  UA Bili  Negative  Negative        [facility]        2018      Medical



     *NA*          [place]



               



     (5/3/18 6:35 PM)          



               

 

 URINE AND  UA Blood  Trace  Negative        [facility]              Medical



     *ABN*          [place]



               



     (5/3/18 6:35 PM)          



               

 

 URINE AND  UA Leuk Est  Small  Negative        [facility]              Medical



     *ABN*          [place]



               



     (5/3/18 6:35 PM)          



               

 

 URINE AND  UA Spec Grav  1.020  <=1.030        [address]



               



               



               

 

 URINE AND  UA pH  6.0  5.0 - 8.0        [address]



               



               



               

 

 URINE AND  UA Color  Yellow  Yellow        [facility]        2018      Medical



     *NA*          [place]



               



     (5/3/18 6:35 PM)          



               

 

 URINE AND  UA Protein  Trace  Negative        [facility]              Medical



     *ABN*          [place]



               



     (5/3/18 6:35 PM)          



               

 

 URINE AND  UA Turbidity  Slight Cloudy  Clear        [address]



     (5/3/18 6:35 PM)          [place]



               



               



               

 

 URINE AND  UA Hyal Cast  0-2  0 - 2        [address]



     (5/3/18 6:35 PM)          [place]



               



               



               

 

 URINE AND  UA Bacteria  Occasional  None Seen        [facility]    /HPF  /HPF  /      [facility]



               



               



               

 

 URINE AND  UA RBC  11-20 /HPF  0 - 2        [address]



               



               



               

 

 URINE AND  UA Sq Epi  Occasional  Few /LPF        [facility]    /LPF    /[address] [place] [address]



               



               



               

 

 URINE AND  UA WBC    None Seen        [facility]    /HPF  /HPF  /[address] [place] [address]



               



               



               

 

 HEMATOLOGY  Basophils #  0.1 K/CMM  0.0 - 0.2        [address]



               



               



               

 

 HEMATOLOGY  Polychrom  Slight          [address]



               



               



               

 

 HEMATOLOGY  Plt Morph  Normal          [address]



     (5/3/18 6:20 PM)          [place]



               



               



               

 

 Brain  Brain shunt  EXAM: SKULL 2 VIEWS        -  [facility]



 shunt  series DX      /    -  Medical



 series DX    EXAM: CHEST 2 VIEWS        This report was dictated by a 
Radiology Resident/Fellow.  I have personally reviewed the images as  Center



             well as the Resident's interpretation and agree with the findings.
  



     EXAM: ABDOMEN 2 VIEWS        Read by:  Bernardo Lorenz MD        
  Resident:  Bernardo Lorenz MD  



             Dictated Date/time:  18 20:33  



             Electronically Signed by:  Martin Phillips MD                      
   18 22:11  



             FINAL REPORT  



     DATE: 5/3/2018 7:20 PM CDT          



               



               



               



     INDICATION: Headache. - Please include Codman view for shunt setting.     
     



               



               



               



     COMPARISON: Brain shuntogram 2013          



               



               



               



     TECHNIQUE: AP and lateral views of the skull, chest and abdomen.          



               



               



               



     FINDINGS:          



               



     There is a single intact shunt tube with the proximal aspect in the right 
frontal calvarium coursing across midline to the left anterior chest and 
abdomen with the tip coiled within the pelvis.          



               



               



               



     A right parietal shunt with distal tip in the right lateral abdomen is 
discontinuous. Another shunt present with proximal tip in the right neck base 
and distal tip in the medial mid abdomen          



               



     Cholecystectomy clips are noted. The lungs are clear but underinflated. 
Appearance of the pelvis is unchanged with bilateral shallow acetabular roofs. 
No obvious posterior dislocation. Spinal dysraphism          



      is seen with absence of the spinous process from L3 to S1.          



               



               



               



     IMPRESSION:          



               



               



               



     1. No significant change. The right transfrontal shunt catheter is intact 
along its course with the distal tip in the pelvis.          



               



     2. Discontinuous right parieto-occipital catheter and 2 discontinuous 
catheters in the right chest and abdomen are unchanged.          



               



     3. Spinal dysraphism.          



               



               



               



               

 

 Brain wo  Brain wo  EXAM: CT BRAIN WITHOUT CONTRAST        -  [facility]



 contrast  contrast CT      /    -  Medical



 CT            This report was dictated by a Radiology Resident/Fellow.  I have 
personally reviewed the images as  Center



             well as the Resident's interpretation and agree with the findings.
  



     DATE: 5/3/2018 627 PM CDT        Read by:  Bernardo Lorenz MD    
      Resident:  Bernardo Lorenz MD  



             Dictated Date/time:  18 18:45  



             Electronically Signed by:  Martin Phillips MD                      
   18 21:12  



             FINAL REPORT  



     INDICATION: 32-year-old male patient with history of  shunt malfunction 
         



               



               



               



     COMPARISON: CT of the brain 2015, 2013.          



               



               



               



     TECHNIQUE: Axial CT images of the brain were obtained. Sagittal and 
coronal reformats.          



               



     IV contrast: None.          



               



               



               



     FINDINGS:          



               



     An orphaned right occipital approach  shunt is present. Also present is 
a right frontal approach  shunt with tip in the left lateral ventricle.      
    



               



     Narrowing of the lateral ventricles is present, unchanged from 2015. 
         



               



     There is mild uncal and cerebellar tonsillar herniation, also unchanged.  
        



               



     No recent hemorrhage or territorial infarct. No mass. No midline shift.   
       



               



     No scalp fluid collections.          



               



     Sinuses are clear.          



               



               



               



     IMPRESSION:          



               



     No acute intracranial abnormality.          



               



     Adequately decompressed ventricular system.          



               



               



               



               

 

 Chest  Chest 1view  EXAM: XR CHEST 1 VIEW        -  [facility]



 1view DX  DX      /    -  Medical



             This report was dictated by a Radiology Resident/Fellow.  I have 
personally reviewed the images as  Center



             well as the Resident's interpretation and agree with the findings.
  



     DATE: 5/3/2018 6:12 PM CDT        Read by:  Olvin Palacio MD          
       Resident:  Olvin Palacio MD  



             Dictated Date/time:  18 18:31  



             Electronically Signed by:  Bakari Interiano MD              
   18 19:29  



             FINAL REPORT  



     INDICATION:  - picc line use          



               



               



               



     UT SECTION: ER          



               



               



               



     COMPARISON: None.          



               



               



               



     TECHNIQUE: AP chest          



               



               



               



     FINDINGS:          



               



     Lines, tubes and hardware:          



               



     Left PICC tip at mid SVC.          



               



               



               



     Lungs and pleura: No pulmonary or pleural based abnormality is identified. 
Pulmonary vascularity is normal.          



               



               



               



     Heart and mediastinum: The heart size is normal for technique. The 
mediastinal contours are normal.          



               



               



               



     Bones: No acute bony abnormality is identified.          



               



               



               



     Upper abdomen: Normal.          



               



               



               



               



               



     IMPRESSION:          



               



     Left PICC tip at mid SVC.          



               



               



               



     No acute cardiopulmonary abnormality is observed.          



               



               



               



               

 

 Laboratory  Sodium Level  142 mEq/L  135 - 145        [facility]



 Studies        /      [facility]



               [place]



               



               



               

 

 Laboratory  Potassium  4.4 mEq/L  3.6 - 5.0        [facility]



 Studies  Level      /      [facility]



               [place]



               



               



               

 

 Laboratory  Magnesium  1.8 mg/dL  1.8 - 2.5        [facility]



 Studies  Level      /      [facility]



               [place]



               



               



               

 

 Laboratory  Glucose  126 mg/dL  65 - 120        [facility]



 Studies  Level      /      [facility]



               [place]



               



               



               

 

 Laboratory  Estimat  null  90        [facility]



 Studies  Glomerular      /      [facility]



   Filtration            [place]



   Rate            



               



               

 

 Laboratory  Creatinine  0.83 mg/dL  0.61 -        [facility]



 Studies      1.24        [facility]



               [place]



               



               



               

 

 Laboratory  Chloride  108 mEq/L  101 - 111        [facility].



 Studies  Level      /      [facility]



               [place]



               



               



               

 

 Laboratory  Carbon  27 mEq/L  21 - 31        [facility].



 Studies  Dioxide      /      [facility]



   Level            [place]



               



               



               

 

 Laboratory  Calcium  9.1 mg/dL  8.5 - 10.5        [facility].



 Studies  Level      /      [facility]



               [place]



               



               



               

 

 Laboratory  Blood Urea  15 mg/dL  6 - 20        [facility].



 Studies  Nitrogen      /      [facility]



               [place]



               



               



               

 

 Laboratory  White Blood  7.0 K/uL  4.3 - 10.9        [facility].



 Studies  Count      /      [facility]



               [place]



               



               



               

 

 Laboratory  Red Cell  17.4 %  12.1 -        [facility].



 Studies  Distribution    15.2        [facility]



   Width            [place]



               



               



               

 

 Laboratory  Red Blood  5.14 M/uL  4.33 -        [facility].



 Studies  Count    5.43        [facility]



               [place]



               



               



               

 

 Laboratory  Platelet  270 K/uL  152 - 406        [facility].



 Studies  Count      /      [facility]



               [place]



               



               



               

 

 Laboratory  Neutrophils  62.3 %  41.7 -        [facility].



 Studies  %    73.7  /      [facility]



               [place]



               



               



               

 

 Laboratory  Monocytes %  9.3 %  3.3 - 12.3        [facility].



 Studies        /      [facility]



               [place]



               



               



               

 

 Laboratory  Mean  8.3 fL  7.6 - 11.3        [facility].



 Studies  Platelet      /      [facility]



   Volume            [place]



               



               



               

 

 Laboratory  Mean  82.8 fL  80 - 100        [facility].



 Studies  Corpuscular      /      [facility]



   Volume            [place]



               



               



               

 

 Laboratory  Mean  33.4 g/dL  32.0 -        [facility].



 Studies  Corpuscular    36.0        [facility]



   Hemoglobin            [place]



   Concent            



               



               

 

 Laboratory  Mean  27.6 pg  27.0 -        [facility].



 Studies  Corpuscular    35.0  /      [facility]



   Hemoglobin            [place]



               



               



               

 

 Laboratory  Lymphocytes  24.6 %  15.3 -        [facility].



 Studies  %    44.8  /      [facility]



               [place]



               



               



               

 

 Laboratory  Hemoglobin  14.2 g/dL  13.6 -        [facility]



 Studies      17.9  /      [facility]



               [place]



               



               



               

 

 Laboratory  Hematocrit  42.6 %  39.6 -        [facility]



 Studies      49.0  /      [facility]



               [place]



               



               



               

 

 Laboratory  Eosinophils  3.3 %  0 - 4.4        [facility]



 Studies  %      /      [facility]



               [place]



               



               



               

 

 Laboratory  Basophils %  0.5 %  0 - 1.3        [facility]



 Studies        /      [facility]



               [place]



               



               



               

 

 Laboratory  Absolute  4.4 K/uL  1.8 - 8.0        [facility]



 Studies  Neutrophil      /      [facility]



               [place]



               



               



               

 

 Laboratory  Absolute  0.7 K/uL  0.1 - 1.3        [facility]



 Studies  Monocytes      /      [facility]



   (CBC)            [place]



               



               



               

 

 Laboratory  Absolute  1.7 K/uL  0.7 - 4.9        [facility]



 Studies  Lymphocytes      /      [facility]



   (CBC)            [place]



               



               



               

 

 Laboratory  Absolute  0.2 K/uL  0 - 0.5        [facility]



 Studies  Eosinophils            [facility]



   (CBC)            [place]



               



               



               

 

 Laboratory  Absolute  0.0 K/uL  0 - 0.5        [facility]



 Studies  Basophils            [facility]



   (CBC)            [place]



               



               



               

 

 Microbiolo  Providencia  Providenci          [facility]



 gy Studies  Rettgeri  a Rettgeri    /      [facility]



               [place]



               



               



               

 

 Laboratory  Urine WBC  null          [facility]



 Studies        /      [facility]



               [place]



               



               



               

 

 Laboratory  Urine  null          [facility]



 Studies  Squamous      /      [facility]



   Epithelial            [place]



   Cells            



               



               

 

 Laboratory  Urine RBC  Urine RBC          [facility]



 Studies        /      [facility]



               [place]



               



               



               

 

 Laboratory  Urine  Urine          [facility]



 Studies  Culture  Culture    /      [facility]



   Reflexed  Reflexed          [place]



               



               



               

 

 Laboratory  Urine  null          [facility]



 Studies  Bacteria      /      [facility]



               [place]



               



               



               

 

 Laboratory  Urine pH  6.5          [facility]



 Studies        /2018      [facility]



               [place]



               



               



               

 

 Laboratory  Urine  2.0 mg/dL          [facility]



 Studies  Urobilinogen      /      [facility]



               [place]



               



               



               

 

 Laboratory  Urine Total  Urine          [facility]



 Studies  Protein  Total    /      [facility]



     Protein          [place]



               



               



               

 

 Laboratory  Urine  1.020          [facility]



 Studies  Specific      /      [facility]



   [place]            [place]



               



               



               

 

 Laboratory  Urine  Urine          [facility]



 Studies  Nitrite  Nitrite    /      [facility]



               [place]



               



               



               

 

 Laboratory  Urine  Urine          [facility]



 Studies  Leukocyte  Leukocyte    /      [facility]



   Esterase  Esterase          [place]



               



               



               

 

 Laboratory  Urine  Urine          [facility]



 Studies  Ketones  Ketones          [facility]



               [place]



               



               



               

 

 Laboratory  Urine  Urine          [facility]



 Studies  Glucose  Glucose          [facility]



               [place]



               



               



               

 

 Laboratory  Urine Color  Urine          [facility].



 Studies    Color    /      [facility]



               [place]



               



               



               

 

 Laboratory  Urine Blood  Urine          [facility].



 Studies    Blood          [facility]



               [place]



               



               



               

 

 Laboratory  Urine  Urine          [facility].



 Studies  Bilirubin  Bilirubin          [facility]



               [place]



               



               



               

 

 Laboratory  Urine  Urine          [facility].



 Studies  Appearance  Appearance    /      [facility]



               [place]



               



               



               

 

 Laboratory  Prothrombin  13.0  9.5 - 12.5        [facility]



 Studies  Time  SECONDS          [facility]



               [place]



               



               



               

 

 Laboratory  INR  1.10          [facility].



 Studies  Internationa      /      [facility]



   l Normalized            [place]



   Ratio            



               



               

 

 Laboratory  Total  1.9 mg/dL  0.3 - 1.2        [facility]



 Studies  Bilirubin      /      [facility]



               [place]



               



               



               

 

 Laboratory  Serum Total  7.8 g/dL  6.0 - 8.3        [facility].



 Studies  Protein      /      [facility]



               [place]



               



               



               

 

 Laboratory  Globulin  4.2 g/dL  2.3 - 3.5        [facility].



 Studies        /      [facility]



               [place]



               



               



               

 

 Laboratory  Direct  0.6 mg/dL  0 - 0.2        [facility]



 Studies  Bilirubin      /      [facility]



               [place]



               



               



               

 

 Laboratory  Aspartate  88 IU/L  10 - 42        [facility].



 Studies  Amino Transf      /      [facility]



   (AST/SGOT)            [place]



               



               



               

 

 Laboratory  Alkaline  192 IU/L  42 - 121        [facility].



 Studies  Phosphatase      /      [facility]



               [place]



               



               



               

 

 Laboratory  Albumin/Glob  0.9  1.1 - 1.8        [facility]



 Studies  ulin Ratio      /      [facility]



               [place]



               



               



               

 

 Laboratory  Albumin  3.6 g/dL  3.2 - 5.5        [facility].



 Studies        /      [facility]



               [place]



               



               



               

 

 Laboratory  Alanine  135 IU/L  10 - 60        [facility].



 Studies  Aminotransfe      /      [facility]



   rase            [place]



   (ALT/SGPT)            



               



               

 

 Laboratory  Procalcitoni  0.44 ng/mL          [facility].



 Studies  n            [facility]



               [place]



               



               



               

 

 Laboratory  B-Type  20 pg/ml          [facility].



 Studies  Natriuretic      /      [facility]



   Peptide            [place]



               



               



               

 

 Laboratory  Lipase  22 U/L  22 - 51        [facility]



 Studies              [facility]



               [place]



               



               



               

 

 Laboratory  Rapid  null          [facility]



 Studies  Troponin I            [facility]



               [place]



               



               



               

 

 Laboratory  Lactic Acid  8.6 mg/dL  4.5 - 19.8        [facility]



 Studies  Level            [facility]



               [place]



               



               



               

 

 Microbiolo  Morganella  Morganella          [facility]



 gy Studies  Morganii  Morganii          [facility]



               [place]



               



               



               

 

 Microbiolo  Proteus  Proteus          [facility]



 gy Studies  Mirabilis  Mirabilis          [facility]



               [place]



               



               



               

 

 Laboratory  Activated  32.2  24.3 -        [facility]



 Studies  Partial  SECONDS  36.9        [facility]



   Thromboplast            [place]



   Time            



               



               







Vital Signs







 Vital Sign  Value  Date  Comments  Source



         

 

 Temperature Oral (F)  97.2 F  2018    [facility]



         

 

 Systolic (mm Hg)  127  2018    [facility]



         

 

 Diastolic (mm Hg)  85  2018    [facility]



         

 

 Heart Rate  81  2018    [facility]



         

 

 Respitory Rate  18  2018    [facility]



         

 

 Heart Rate  90  2018    [facility]



         

 

 Systolic (mm Hg)  106  2018    [facility]



         

 

 Diastolic (mm Hg)  71  2018    [facility]



         

 

 Respitory Rate  18  2018    [facility]



         

 

 Temperature Oral (F)  98.1 F  2018    [facility]



         

 

 Respitory Rate  18  2018    [facility]



         

 

 Systolic (mm Hg)  168  2018    [facility]



         

 

 Diastolic (mm Hg)  107  2018    [facility]



         

 

 Heart Rate  87  2018    [facility]



         

 

 Temperature Oral (F)  98.1 F  2018    [facility]



         

 

 Weight  127.027  2018    [facility]



         

 

 Weight  127.027  2018    [facility]



         

 

 Weight  127.027  2018    [facility]



         

 

 BMI Calculated  48.16  2018    [facility]



         

 

 Height  162.56 cm  2018    [facility]



         

 

 Height  149.86 cm  2018    [facility]



         

 

 BMI Calculated  56.67  2018    [facility]



         

 

 Heart Rate  85  2018    [facility]



         [place]



         

 

 Systolic (mm Hg)  145  2018    [facility]



         [place]



         

 

 Diastolic (mm Hg)  66  2018    [facility]



         [place]



         

 

 Temperature Oral (F)  97.7 F  2018    [facility]



         [place]



         

 

 Respitory Rate  16  2018    [facility]



         [place]



         

 

 Height  59  2018    [facility]



         [place]



         

 

 Weight  280  2018    [facility]



         [place]



         







Encounters







 Location  Location  Encounter  Encounter  Reason  Attending  ADM  DC  Status  
Source



   Details  Type  Number  For  Provider  Date  Date    



         Visit          



                   



                   



                   

 

 [facility]    Discharged  [id number]          [facility]    Rec[facility]        /2017    [facility]



 [place]                  [place]



                   [facility]



                   



                   

 

 [facility]    Discharged  [id number]          [facility]



 [facility]    Recurring        /2018    [facility]



 [place]                  [place]



                   [place]



                   



                   

 

 [facility]    Departed  [id number]          [facility]



 [facility]    Emergency        /2018    [facility]



 [place]                  [place]



                   [place]



                   



                   

 

 [facility]    Discharged  [id number]          [facility]



 [facility]    Recurring        /2018    [facility]



 [place]                  [place]



                   [place]



                   



                   

 

 [facility]    Registered  [id number]      03/15      [facility]



 [facility]    Recurring        /      [facility]



 [place]                  [place]



                   [place]



                   



                   

 

 [facility]    Discharged  [id number]          [facility]



 [facility]    Inpatient        /2018    [facility]



 [place]                  [place]



                   [place]



                   



                   

 

 [facility]    Inpatient  [id number]    [place]      [facility]  /2018    [facility]



                   



                   



                   







Procedures







 Procedure  Code  Date  Perfomer  Comments  Source



           



           

 

 Chest Single View  [id number]        [facility]



     [facility]      [place]



           



           

 

 Gram Stain  [id number]        [facility]



     [facility]      [place]



           



           

 

 Culture & Sensitivity  [id number]        [facility]



     [facility]      [place]



           



           

 

 Anaerobic Blood  [id number]        [facility]



 Culture    8      [place]



           



           

 

 Aerobic Blood Culture  [id number]        [facility]



     [facility]      [place]



           



           

 

 Chest Single View  [id number]        [facility]



     8      [place]



           



           

 

 Gram Stain  [id number]        [facility] -



     8      [place]



           



           

 

 Culture & Sensitivity  [id number]        [facility]



     8      [place]



           



           

 

 Chest Single View  [id number]        [facility] -



     8      [place]



           



           

 

 Cholecystectomy  [id number]        [facility]



           



           

 

 Shunt of cerebral  [id number]        [facility]



 ventricle to          [facility]



 extracranial site

## 2019-01-18 NOTE — XMS REPORT
Patient Summary Document

 Created on:2019



Patient:JORDAN VERDIN

Sex:Male

:1985

External Reference #:[id number]





Demographics







 Address  [address]



   [place] [address]

 

 Home Phone  [phone]

 

 Preferred Language  Unknown

 

 Marital Status  Unknown

 

 Shinto Affiliation  Unknown

 

 Race  Unknown

 

 Additional Race(s)  Unavailable

 

 Ethnic Group  Unknown









Author







 Organization  [facility]

 

 Address  [facility] [address] [place] [address]

 

 Phone  [phone]









Care Team Providers







 Name  Role  Phone

 

 MELISSARAMU  Unavailable  Unavailable









Problems

This patient has no known problems.



Allergies, Adverse Reactions, Alerts

This patient has no known allergies or adverse reactions.



Medications

This patient has no known medications.



Results







 Test Description  Test Time  Test Comments  Text Results  Atomic Results  
Result Comments









 BLOOD CULTURE  2017 16:22:00    









   Test Item  Value  Reference Range  Comments









 CULTURE (BEAKER) (test code=1095)  No growth in 5 days    



BLOOD [id number] 16:22:00





 Test Item  Value  Reference Range  Comments

 

 CULTURE (BEAKER) (test code=1095)  No growth in 5 days    



(MANUAL DIFFERENTIAL)2017 22:29:00





 Test Item  Value  Reference Range  Comments

 

 TOTAL COUNTED (BEAKER) (test code=1351)      

 

 WBC MORPHOLOGY (BEAKER) (test code=487)  Normal    

 

 PLT MORPHOLOGY (BEAKER) (test code=486)  Normal    

 

 RBC MORPHOLOGY (BEAKER) (test code=762)  Normal    



CBC W/PLT COUNT &amp; AUTO [id number] 22:28:00





 Test Item  Value  Reference Range  Comments

 

 WHITE BLOOD CELL COUNT (BEAKER) (test code=775)  7.3 K/ L  4.0-10.0  

 

 RED BLOOD CELL COUNT (BEAKER) (test code=761)  5.09 M/ L  4.20-5.80  

 

 HEMOGLOBIN (BEAKER) (test code=410)  13.0 GM/DL  13.0-16.8  

 

 HEMATOCRIT (BEAKER) (test code=411)  42.3 %  40.0-50.0  

 

 MEAN CORPUSCULAR VOLUME (BEAKER) (test code=753)  83.0 fL  82.0-98.0  

 

 MEAN CORPUSCULAR HEMOGLOBIN (BEAKER) (test  25.6 pg  27.0-33.0  



 code=751)      

 

 MEAN CORPUSCULAR HEMOGLOBIN CONC (BEAKER) (test  30.8 GM/DL  32.0-36.0  



 code=752)      

 

 RED CELL DISTRIBUTION WIDTH (BEAKER) (test  18.0 %  10.3-14.2  



 code=412)      

 

 PLATELET COUNT (BEAKER) (test code=756)  331 K/CU MM  150-430  

 

 MEAN PLATELET VOLUME (BEAKER) (test code=754)  8.5 fL  6.5-10.5  

 

 NUCLEATED RED BLOOD CELLS (BEAKER) (test  0 /100 WBC  0-0  



 code=413)      

 

 NEUTROPHILS RELATIVE PERCENT (BEAKER) (test  65 %    



 code=429)      

 

 LYMPHOCYTES RELATIVE PERCENT (BEAKER) (test  23 %    



 code=430)      

 

 MONOCYTES RELATIVE PERCENT (BEAKER) (test  8 %    



 code=431)      

 

 EOSINOPHILS RELATIVE PERCENT (BEAKER) (test  3 %    



 code=432)      

 

 BASOPHILS RELATIVE PERCENT (BEAKER) (test  1 %    



 code=437)      

 

 NEUTROPHILS ABSOLUTE COUNT (BEAKER) (test  4.75 K/ L  1.80-8.00  



 code=670)      

 

 LYMPHOCYTES ABSOLUTE COUNT (BEAKER) (test  1.68 K/ L  1.48-4.50  



 code=414)      

 

 MONOCYTES ABSOLUTE COUNT (BEAKER) (test  0.55 K/ L  0.00-1.30  



 code=415)      

 

 EOSINOPHILS ABSOLUTE COUNT (BEAKER) (test  0.24 K/ L  0.00-0.50  



 code=416)      

 

 BASOPHILS ABSOLUTE COUNT (BEAKER) (test  0.05 K/ L  0.00-0.20  



 code=417)      



[id number] METABOLIC [id number] 11:11:00





 Test Item  Value  Reference Range  Comments

 

 SODIUM (BEAKER) (test  138 meq/L  136-145  



 dpza=211)      

 

 POTASSIUM (BEAKER) (test  4.0 meq/L  3.5-5.1  



 code=379)      

 

 CHLORIDE (BEAKER) (test  108 meq/L    



 code=382)      

 

 CO2 (BEAKER) (test  23 meq/L  22-29  



 code=355)      

 

 BLOOD UREA NITROGEN  11 mg/dL  7-21  



 (BEAKER) (test code=354)      

 

 CREATININE (BEAKER) (test  0.74 mg/dL  0.57-1.25  



 code=358)      

 

 GLUCOSE RANDOM (BEAKER)  124 mg/dL    



 (test code=652)      

 

 CALCIUM (BEAKER) (test  8.9 mg/dL  8.4-10.2  



 code=697)      

 

 EGFR (BEAKER) (test  150 mL/min/1.73 sq m    ESTIMATED GFR IS NOT AS



 code=1092)      ACCURATE AS CREATININE



       CLEARANCE IN PREDICTING



       GLOMERULAR FILTRATION



       RATE. ESTIMATED GFR IS



       NOT APPLICABLE FOR



       DIALYSIS PATIENTS.



URINE [id number] 09:56:00





 Test Item  Value  Reference Range  Comments

 

 CULTURE (BEAKER) (test code=1095)  <10,000 col/mL skin jaime    



COMPREHENSIVE METABOLIC [id number] 08:49:00





 Test Item  Value  Reference Range  Comments

 

 TOTAL PROTEIN (BEAKER)  7.3 gm/dL  6.0-8.3  



 (test code=770)      

 

 ALBUMIN (BEAKER) (test  3.3 g/dL  3.5-5.0  



 code=1145)      

 

 ALKALINE PHOSPHATASE  89 U/L    



 (BEAKER) (test code=346)      

 

 BILIRUBIN TOTAL (BEAKER)  1.4 mg/dL  0.2-1.2  



 (test code=377)      

 

 SODIUM (BEAKER) (test  137 meq/L  136-145  



 boly=411)      

 

 POTASSIUM (BEAKER) (test  3.7 meq/L  3.5-5.1  



 code=379)      

 

 CHLORIDE (BEAKER) (test  108 meq/L    



 code=382)      

 

 CO2 (BEAKER) (test  17 meq/L  22-29  



 code=355)      

 

 BLOOD UREA NITROGEN  12 mg/dL  7-21  



 (BEAKER) (test code=354)      

 

 CREATININE (BEAKER) (test  0.78 mg/dL  0.57-1.25  



 code=358)      

 

 GLUCOSE RANDOM (BEAKER)  119 mg/dL    



 (test code=652)      

 

 CALCIUM (BEAKER) (test  8.6 mg/dL  8.4-10.2  



 code=697)      

 

 AST (SGOT) (BEAKER) (test  13 U/L  5-34  



 code=353)      

 

 ALT (SGPT) (BEAKER) (test  28 U/L  6-55  



 code=347)      

 

 EGFR (BEAKER) (test  141 mL/min/1.73 sq    ESTIMATED GFR IS NOT AS



 code=1092)  m    ACCURATE AS CREATININE



       CLEARANCE IN PREDICTING



       GLOMERULAR FILTRATION



       RATE. ESTIMATED GFR IS



       NOT APPLICABLE FOR



       DIALYSIS PATIENTS.



CBC W/PLT COUNT &amp; AUTO [id number] 08:46:00





 Test Item  Value  Reference Range  Comments

 

 WHITE BLOOD CELL COUNT (BEAKER) (test code=775)  9.4 K/ L  4.0-10.0  

 

 RED BLOOD CELL COUNT (BEAKER) (test code=761)  4.79 M/ L  4.20-5.80  

 

 HEMOGLOBIN (BEAKER) (test code=410)  12.8 GM/DL  13.0-16.8  

 

 HEMATOCRIT (BEAKER) (test code=411)  40.0 %  40.0-50.0  

 

 MEAN CORPUSCULAR VOLUME (BEAKER) (test code=753)  83.4 fL  82.0-98.0  

 

 MEAN CORPUSCULAR HEMOGLOBIN (BEAKER) (test  26.7 pg  27.0-33.0  



 code=751)      

 

 MEAN CORPUSCULAR HEMOGLOBIN CONC (BEAKER) (test  32.0 GM/DL  32.0-36.0  



 code=752)      

 

 RED CELL DISTRIBUTION WIDTH (BEAKER) (test  18.2 %  10.3-14.2  



 code=412)      

 

 PLATELET COUNT (BEAKER) (test code=756)  313 K/CU MM  150-430  

 

 MEAN PLATELET VOLUME (BEAKER) (test code=754)  8.6 fL  6.5-10.5  

 

 NUCLEATED RED BLOOD CELLS (BEAKER) (test  0 /100 WBC  0-0  



 code=413)      

 

 NEUTROPHILS RELATIVE PERCENT (BEAKER) (test  70 %    



 code=429)      

 

 LYMPHOCYTES RELATIVE PERCENT (BEAKER) (test  16 %    



 code=430)      

 

 MONOCYTES RELATIVE PERCENT (BEAKER) (test  12 %    



 code=431)      

 

 EOSINOPHILS RELATIVE PERCENT (BEAKER) (test  1 %    



 code=432)      

 

 BASOPHILS RELATIVE PERCENT (BEAKER) (test  1 %    



 code=437)      

 

 NEUTROPHILS ABSOLUTE COUNT (BEAKER) (test  6.54 K/ L  1.80-8.00  



 code=670)      

 

 LYMPHOCYTES ABSOLUTE COUNT (BEAKER) (test  1.50 K/ L  1.48-4.50  



 code=414)      

 

 MONOCYTES ABSOLUTE COUNT (BEAKER) (test  1.13 K/ L  0.00-1.30  



 code=415)      

 

 EOSINOPHILS ABSOLUTE COUNT (BEAKER) (test  0.13 K/ L  0.00-0.50  



 code=416)      

 

 BASOPHILS ABSOLUTE COUNT (BEAKER) (test  0.06 K/ L  0.00-0.20  



 code=417)      



0.00URINALYSIS W/ [id number] 20:36:00





 Test Item  Value  Reference Range  Comments

 

 COLOR (BEAKER) (test code=470)  Yellow    

 

 CLARITY (BEAKER) (test code=469)  Hazy    

 

 SPECIFIC GRAVITY UA (BEAKER) (test code=468)  1.012  1.001-1.035  

 

 PH UA (BEAKER) (test code=467)  5.5  5.0-8.0  

 

 PROTEIN UA (BEAKER) (test code=464)  100 mg/dL  Negative  

 

 GLUCOSE UA (BEAKER) (test code=365)  Negative  Negative  

 

 KETONES UA (BEAKER) (test code=371)  Negative  Negative  

 

 BILIRUBIN UA (BEAKER) (test code=462)  Negative  Negative  

 

 BLOOD UA (BEAKER) (test code=461)  Moderate  Negative  

 

 NITRITE UA (BEAKER) (test code=465)  Negative  Negative  

 

 LEUKOCYTE ESTERASE UA (BEAKER) (test code=466)  Large  Negative  

 

 UROBILINOGEN UA (BEAKER) (test code=463)  3.0 mg/dL  0.2-1.0  

 

 RBC UA (BEAKER) (test code=519)  19 /HPF    

 

 WBC UA (BEAKER) (test code=520)  182 /HPF    

 

 SOURCE(BEAKER) (test code=2795)      



BASIC METABOLIC [id number] 17:00:00





 Test Item  Value  Reference Range  Comments

 

 SODIUM (BEAKER) (test  140 meq/L  136-145  



 qzmm=939)      

 

 POTASSIUM (BEAKER) (test  3.7 meq/L  3.5-5.1  



 code=379)      

 

 CHLORIDE (BEAKER) (test  112 meq/L    



 code=382)      

 

 CO2 (BEAKER) (test  17 meq/L  22-29  



 code=355)      

 

 BLOOD UREA NITROGEN  23 mg/dL  7-21  



 (BEAKER) (test code=354)      

 

 CREATININE (BEAKER) (test  1.00 mg/dL  0.57-1.25  



 code=358)      

 

 GLUCOSE RANDOM (BEAKER)  102 mg/dL    



 (test code=652)      

 

 CALCIUM (BEAKER) (test  8.7 mg/dL  8.4-10.2  



 code=697)      

 

 EGFR (BEAKER) (test  106 mL/min/1.73 sq m    ESTIMATED GFR IS NOT AS



 code=1092)      ACCURATE AS CREATININE



       CLEARANCE IN PREDICTING



       GLOMERULAR FILTRATION



       RATE. ESTIMATED GFR IS



       NOT APPLICABLE FOR



       DIALYSIS PATIENTS.



Specimen slightly ictericCBC W/PLT COUNT &amp; AUTO [id number] 12:18
:00





 Test Item  Value  Reference Range  Comments

 

 WHITE BLOOD CELL COUNT (BEAKER) (test code=775)  16.4 K/ L  4.0-10.0  

 

 RED BLOOD CELL COUNT (BEAKER) (test code=761)  5.22 M/ L  4.20-5.80  

 

 HEMOGLOBIN (BEAKER) (test code=410)  14.4 GM/DL  13.0-16.8  

 

 HEMATOCRIT (BEAKER) (test code=411)  42.9 %  40.0-50.0  

 

 MEAN CORPUSCULAR VOLUME (BEAKER) (test code=753)  82.2 fL  82.0-98.0  

 

 MEAN CORPUSCULAR HEMOGLOBIN (BEAKER) (test  27.5 pg  27.0-33.0  



 code=751)      

 

 MEAN CORPUSCULAR HEMOGLOBIN CONC (BEAKER) (test  33.5 GM/DL  32.0-36.0  



 code=752)      

 

 RED CELL DISTRIBUTION WIDTH (BEAKER) (test  16.2 %  10.3-14.2  



 code=412)      

 

 PLATELET COUNT (BEAKER) (test code=756)  329 K/CU MM  150-430  

 

 MEAN PLATELET VOLUME (BEAKER) (test code=754)  8.2 fL  6.5-10.5  

 

 NUCLEATED RED BLOOD CELLS (BEAKER) (test  0 /100 WBC  0-0  



 code=413)      

 

 NEUTROPHILS RELATIVE PERCENT (BEAKER) (test  83 %    



 code=429)      

 

 LYMPHOCYTES RELATIVE PERCENT (BEAKER) (test  7 %    



 code=430)      

 

 MONOCYTES RELATIVE PERCENT (BEAKER) (test  9 %    



 code=431)      

 

 EOSINOPHILS RELATIVE PERCENT (BEAKER) (test  0 %    



 code=432)      

 

 BASOPHILS RELATIVE PERCENT (BEAKER) (test  0 %    



 code=437)      

 

 NEUTROPHILS ABSOLUTE COUNT (BEAKER) (test  1.62 K/ L  1.80-8.00  



 code=670)      

 

 LYMPHOCYTES ABSOLUTE COUNT (BEAKER) (test  1.15 K/ L  1.48-4.50  



 code=414)      

 

 MONOCYTES ABSOLUTE COUNT (BEAKER) (test  1.56 K/ L  0.00-1.30  



 code=415)      

 

 EOSINOPHILS ABSOLUTE COUNT (BEAKER) (test  0.01 K/ L  0.00-0.50  



 code=416)      

 

 BASOPHILS ABSOLUTE COUNT (BEAKER) (test  0.02 K/ L  0.00-0.20  



 code=417)      



(MANUAL DIFFERENTIAL)2017 12:18:00





 Test Item  Value  Reference Range  Comments

 

 TOTAL COUNTED (BEAKER) (test code=1351)      

 

 WBC MORPHOLOGY (BEAKER) (test code=487)  Normal    

 

 PLT MORPHOLOGY (BEAKER) (test code=486)  Normal    

 

 RBC MORPHOLOGY (BEAKER) (test code=762)  Normal

## 2019-01-18 NOTE — XMS REPORT
Clinical Summary

 Created on:2019



Patient:Redd Dale

Sex:Male

:1985

External Reference #:[id number]





Demographics







 Address  [address]



   [place] [address]

 

 Home Phone  [phone]

 

 Preferred Language  English

 

 Marital Status  Unknown

 

 Caodaism Affiliation  Unknown

 

 Race  Black or 

 

 Ethnic Group  Not  or 









Author







 Organization  [facility]

 

 Address  [address] [place] [address]









Support







 Name  Relationship  Address  Phone

 

 Sabrina Dale  Unavailable  [address]  [phone]



     [place] [address]  









Care Team Providers







 Name  Role  Phone

 

 Ta  Primary Care Provider  [phone]









Allergies







 Active Allergy  Reactions  Severity  Noted Date  Comments

 

 Sulfamethoxazole-Trimethoprim  Hives  High  2017  

 

 Levofloxacin  Hives  High  2017  

 

 Morphine  Hives  High  2017  

 

 Sesame Seed  Hives    2017  

 

 Ketorolac  Rash  High  2017  

 

 Vancomycin Analogues  Rash  High  2017  

 

 Ondansetron Hcl (Pf)  Nausea And Vomiting    2017  







Medications







 Medication  Sig  Dispensed  Refills  Start Date  End Date  Status

 

 oxyCODONE-acetaminophe  Take 1 tablet by    0      Active



 n (PERCOCET)  mg  mouth every 4          



 per tablet  (four) hours as          



   needed for Pain.          







Active Problems







 Problem  Noted Date

 

 Spina bifida  2017

 

 Pyelonephritis  2017







Social History







 Tobacco Use  Types  Packs/Day  Years Used  Date

 

 Current Every Day Smoker  Cigarettes  0.5    









 Tobacco Cessation: Ready to Quit: No









 Sex Assigned at Birth  Date Recorded

 

 Not on file  









 Job Start Date  Occupation  Industry

 

 Not on file  Not on file  Not on file









 Travel History  Travel Start  Travel End









 No recent travel history available.







Last Filed Vital Signs

Not on file



Plan of Treatment

Not on file



Results

Not on fileafter 2018



Insurance







 Payer  Benefit Plan / Group  Subscriber ID  Type  Phone  Address

 

 MEDICAID - MEDICAID  [employer]  xxxxxxxxx  Medicaid    



 [facility]      Non-Contracted    









 Guarantor Name  Account Type  Relation to  Date of  Phone  Billing Address



     Patient  Birth    

 

 Redd Dale  Personal/Family  Self  1985  [phone]  [address]



         (Home)  [address]







           [place]



           [address]







Advance Directives

For more information, please contact:[facility], [place] [phone]





 Code Status  Date Activated  Date Inactivated  Comments

 

 Full Code  2017  1:36 AM  2017  8:43 PM  









 This code status was determined by:  Patient

## 2019-01-18 NOTE — XMS REPORT
[facility]

 Created on:2018



Patient:Redd Dale

Sex:Male

:1985

External Reference #:[id number]





Demographics







 Address  [address]  [place] [address]

 

 Phone  [phone]

 

 Preferred Language  en

 

 Marital Status  Unknown

 

 Caodaism Affiliation  Unknown

 

 Race  Black or 

 

 Ethnic Group  Unknown









Author







 Organization  [employer]









Care Team Providers







 Name  Role  Phone

 

 Knox, Na  Provider Role  Unavailable









Allergies, Adverse Reactions, Alerts







 Substance  Reaction  Event Type

 

 Zofran  Info Not Available  Drug Allergy

 

 Morphine Sulfate ER  Info Not Available  Drug Allergy

 

 Levaquin  Info Not Available  Drug Allergy







Problems







 Problem Type  Condition  Code  Onset Dates  Condition Status

 

 Assessment  Mental disorder, not otherwise  F99    Active



   specified      

 

 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



   disorder      

 

 Assessment  Ulcer of left foot, unspecified  L97.529    Active



   ulcer stage      

 

 Problem  Foot ulcer  L97.509    Active

 

 Assessment  Nonintractable episodic headache,  R51    Active



   unspecified headache type      

 

 Problem  Constipation  K59.00    Active

 

 Assessment  Episodic tension-type headache, not  G44.219    Active



   intractable      

 

 Problem  Paraplegia  G82.20    Active

 

 Problem  Incontinence of urine  R32    Active

 

 Problem  Nausea alone  R11.0    Active

 

 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



   disorder      

 

 Problem  Mental disorder, not otherwise  F99    Active



   specified      

 

 Assessment  Bipolar depression  F31.30    Active

 

 Assessment  Lumbar spina bifida with  Q05.2    Active



   hydrocephalus      

 

 Problem  Bipolar depression  F31.30    Active

 

 Assessment   (ventriculoperitoneal) shunt  Z98.2    Active



   status      

 

 Problem  Depression with anxiety  F41.8    Active

 

 Problem  Fecal incontinence  R15.9    Active

 

 Problem  Nausea and vomiting, intractability  R11.2    Active



   of vomiting not specified,      



   unspecified vomiting type      

 

 Problem  Ulcer of left foot, unspecified  L97.529    Active



   ulcer stage      

 

 Problem  Episodic tension-type headache, not  G44.219    Active



   intractable      

 

 Problem  Nonintractable episodic headache,  R51    Active



   unspecified headache type      

 

 Assessment  Depression with anxiety  F41.8    Active

 

 Problem  Dependence on wheelchair  Z99.3    Active

 

 Problem  Lumbar spina bifida with  Q05.2    Active



   hydrocephalus      

 

 Problem   (ventriculoperitoneal) shunt  Z98.2    Active



   status      

 

 Problem  Nicotine dependence  F17.200    Active







Medications







 Medication  Code  Code  Instructions  Start  End  Status  Dosage



   System      Date  Date    

 

 Collagenase  NDC  [address]-46  250 UNIT/GM      Active  1 application



       Externally        to affected



       Once a day        area

 

 Macrobid  NDC  58175895562  100 MG Orally      Active  1 capsule



       every 12 hrs        with food

 

 Tylenol with  NDC  53528535300  300-60 MG      Active  1 tablet as



 Codeine #4      Orally every 6        needed



       hrs        

 

 Citalopram  NDC  51943562320  10 MG Orally  July    Active  1 tablet



 Hydrobromide      Once a day  2018      

 

 Pantoprazole  NDC  39199445826  40 MG Orally      Active  1 tablet



 Sodium      Once a day        

 

 Seroquel  NDC  07831457631  25 MG Orally  July    Active  1 tablet



       Once a day at  16,      



       bedtime        







Results

No Known Results



Summary Purpose

[employer] Submission

## 2019-01-18 NOTE — XMS REPORT
[facility]

 Created on:2018



Patient:Redd Dale

Sex:Male

:1985

External Reference #:[id number]





Demographics







 Address  [address]  [place] [address]

 

 Phone  [phone]

 

 Preferred Language  en

 

 Marital Status  Unknown

 

 [facility] Affiliation  Unknown

 

 Race  Black or 

 

 Ethnic Group  Not  or 









Author







 Organization  [employer]









Care Team Providers







 Name  Role  Phone

 

 Knox, Na  Provider Role  Unavailable









Allergies, Adverse Reactions, Alerts







 Substance  Reaction  Event Type

 

 Toradol  Info Not Available  Drug Allergy

 

 Sulfa  Info Not Available  Drug Allergy

 

 Zofran  Info Not Available  Drug Allergy

 

 Morphine Sulfate ER  Info Not Available  Drug Allergy

 

 Levaquin  Info Not Available  Drug Allergy







Problems







 Problem Type  Condition  Code  Onset Dates  Condition Status

 

 Problem  Nicotine dependence  F17.200    Active

 

 Problem  Lumbar spina bifida with  Q05.2    Active



   hydrocephalus      

 

 Problem  Dependence on wheelchair  Z99.3    Active

 

 Problem  Fecal incontinence  R15.9    Active

 

 Problem  Foot ulcer  L97.509    Active

 

 Problem  Depression with anxiety  F41.8    Active

 

 Problem  Paraplegia  G82.20    Active

 

 Problem  Constipation  K59.00    Active

 

 Problem  Incontinence of urine  R32    Active

 

 Problem  Nausea alone  R11.0    Active

 

 Assessment  Episodic tension-type headache, not  G44.219    Active



   intractable      

 

 Assessment   (ventriculoperitoneal) shunt  Z98.2    Active



   status      

 

 Assessment  Ulcer of left foot, unspecified  L97.529    Active



   ulcer stage      

 

 Assessment  Nonintractable episodic headache,  R51    Active



   unspecified headache type      

 

 Assessment  Depression with anxiety  F41.8    Active

 

 Problem  Episodic tension-type headache, not  G44.219    Active



   intractable      

 

 Assessment  Lumbar spina bifida with  Q05.2    Active



   hydrocephalus      

 

 Problem  Nonintractable episodic headache,  R51    Active



   unspecified headache type      

 

 Assessment  Nausea and vomiting, intractability  R11.2    Active



   of vomiting not specified,      



   unspecified vomiting type      

 

 Problem   (ventriculoperitoneal) shunt  Z98.2    Active



   status      







Medications







 Medication  Code  Code  Instructions  Start  End  Status  Dosage



   System      Date  Date    

 

 Tylenol with  NDC  26487976850  300-60 MG      Active  1 tablet as



 Codeine #4      Orally every 6        needed



       hrs        

 

 Macrobid  NDC  28396945600  100 MG Orally      Active  1 capsule



       every 12 hrs        with food

 

 Pantoprazole  NDC  75254986408  40 MG Orally      Active  1 tablet



 Sodium      Once a day        

 

 Collagenase  NDC  [address]-51  250 UNIT/GM      Active  1 application



       Externally        to affected



       Once a day        area







Results

No Known Results



Summary Purpose

[employer] Submission

## 2019-01-18 NOTE — ER
Nurse's Notes                                                                                     

 [facility]                                                                

Name: Redd Dale Jr                                                                            

Age: 33 yrs                                                                                       

Sex: Male                                                                                         

: 1985                                                                                   

MRN: [id number]                                                                                   

Arrival Date: 2019                                                                          

Time: 18:16                                                                                       

Account#: [id number]                                                                            

Bed 24                                                                                            

Private MD:                                                                                       

Diagnosis: Headache                                                                               

                                                                                                  

Presentation:                                                                                     

                                                                                             

18:18 Presenting complaint: Patient states: vomiting, right frontal headache started this     sv  

      morning. Transition of care: patient was received from another setting of care              

      (long-term care facility), [facility]. Onset of symptoms was            

      2019. Care prior to arrival: None.                                              

18:18 Method Of Arrival: EMS: [facility]                                                     sv  

18:18 Acuity: AYESHA 4                                                                           sv  

18:19 Note Percocet given at the NH.                                                          sv  

                                                                                             

01:08 Risk Assessment: Do you want to hurt yourself or someone else? Patient reports no       tl3 

      desire to harm self or others. Initial Sepsis Screen: Does the patient meet any 2           

      criteria? No. Patient's initial sepsis screen is negative. Does the patient have a          

      suspected source of infection? No. Patient's initial sepsis screen is negative.             

                                                                                                  

Triage Assessment:                                                                                

                                                                                             

18:18 General: Appears in no apparent distress. comfortable, obese, Behavior is calm,         sv  

      cooperative, appropriate for age. Pain: Complains of pain in right side of forehead.        

      Neuro: Level of Consciousness is awake, alert, obeys commands, Oriented to person,          

      place, time, situation. Respiratory: Respiratory effort is even, unlabored, Respiratory     

      pattern is regular, symmetrical. GI: Reports vomiting.                                      

                                                                                                  

Historical:                                                                                       

- Allergies:                                                                                      

18:19 Amoxicillin;                                                                            sv  

18:19 Bactrim;                                                                                sv  

18:19 Ciprofloxacin;                                                                          sv  

18:19 CLAVULANIC ACID;                                                                        sv  

18:19 Doxycycline;                                                                            sv  

18:19 Levofloxacin;                                                                           sv  

18:19 Morphine;                                                                               sv  

18:19 Toradol;                                                                                sv  

18:19 TRIMETHOPRIM;                                                                           sv  

18:19 Vancomycin;                                                                             sv  

18:19 Zofran;                                                                                 sv  

- PMHx:                                                                                           

18:19 Asthma; Cerebral Palsy; cluster headaches; decubitus ulcers on feet; GERD;              sv  

      Hydrocephalus; Hypertension; spina bifida;                                                  

- PSHx:                                                                                           

18:19 SHUNT REVISION ; AMPUTATION OF LOWER LIMB ;                                     sv  

                                                                                                  

- Immunization history:: Adult Immunizations up to date.                                          

- Social history:: Smoking status: Patient uses tobacco products, smokes one-half pack            

  cigarettes per day.                                                                             

- Ebola Screening: : No symptoms or risks identified at this time.                                

                                                                                                  

                                                                                                  

Screenin:00 Abuse screen: Denies threats or abuse. Nutritional screening: No deficits noted.        tl3 

      Tuberculosis screening: No symptoms or risk factors identified. Fall Risk Secondary         

      diagnosis (15 points) impaired mobility.                                                    

                                                                                                  

Assessment:                                                                                       

19:00 Reassessment: pt reports that he has been suffering with nausea and abdominal pain for  tl3 

      the last several days. General: Appears uncomfortable, well groomed, well developed,        

      well nourished, Behavior is calm, cooperative, appropriate for age. Pain: Complains of      

      pain in forehead and face and right side of forehead. Neuro: Level of Consciousness is      

      awake, alert, obeys commands, Oriented to person, place, time, situation, Appropriate       

      for age. Cardiovascular: Patient's skin is warm and dry. Respiratory: Airway is patent      

      Respiratory effort is even, unlabored, Respiratory pattern is regular, symmetrical. GI:     

      Reports nausea. : No signs and/or symptoms were reported regarding the genitourinary      

      system. EENT: No signs and/or symptoms were reported regarding the EENT system. Derm:       

      No signs and/or symptoms reported regarding the dermatologic system. Musculoskeletal:       

      No signs and/or symptoms reported regarding the musculoskeletal system.                     

20:30 Reassessment: No changes from previously documented assessment. Patient and/or family   tl3 

      updated on plan of care and expected duration. Pain level reassessed. Patient is alert,     

      oriented x 3, equal unlabored respirations, skin warm/dry/pink. pt resting quietly.         

22:58 Reassessment:  Healthcare notified of pt's discharge status they will send [facility]     bb  

      Solutions for pt transport back to nursing home report given to receiving LVN.              

23:50 Reassessment: Patient appears in no apparent distress at this time. No changes from     tl3 

      previously documented assessment. pt sleeping quietly, waiting on transportation back       

      to facility.                                                                                

                                                                                             

01:05 Reassessment: [facility] transport here for pt.                                            tl3 

                                                                                                  

Vital Signs:                                                                                      

                                                                                             

18:19  / 92; Pulse 81; Resp 18; Temp 98.4; Pulse Ox 99% ; Weight 125.19 kg;             sv  

22:00  / 77; Pulse 81; Resp 18; Pulse Ox 98% on R/A;                                    mg2 

22:45  / 80; Pulse 92; Resp 16; Pulse Ox 100% on R/A;                                   tl3 

                                                                                                  

ED Course:                                                                                        

18:16 Patient arrived in ED.                                                                  mr  

18:18 Triage completed.                                                                       sv  

18:19 Arm band placed on.                                                                     sv  

19:00 Bed in low position. Call light in reach. Side rails up X 1. Pulse ox on. NIBP on.      tl3 

19:00 No provider procedures requiring assistance completed.                                  tl3 

19:12 Andrey Harper MD is Attending Physician.                                              gs  

19:21 Sabrina Meza, RN is Primary Nurse.                                                     tl3 

19:57 Patient moved to radiology.                                                             ag1 

20:00 Missed attempt(s): 24 gauge in right forearm. Bleeding controlled, band aid applied,    jp3 

      catheter tip intact.                                                                        

20:05 Inserted saline lock: 24 gauge in right forearm, using aseptic technique. Blood         jp3 

      collected.                                                                                  

20:10 Warm blanket given.                                                                     jp3 

20:51 CT Head Brain wo Cont In Process Unspecified.                                           EDMS

20:55 Shuntogram XRAY In Process Unspecified.                                                 EDMS

23:00 IV discontinued, intact, bleeding controlled, No redness/swelling at site. Pressure     tl3 

      dressing applied.                                                                           

                                                                                                  

Administered Medications:                                                                         

21:00 Drug: Phenergan 12.5 mg Route: IVP; Infused Over: 5 mins; Site: right forearm;          tl3 

23:10 Follow up: Response: No adverse reaction                                                tl3 

21:10 Not Given (wrong order): Zofran 4 mg IVP once; over 2 minutes                           gs  

21:35 Not Given (Patient Refused): fentaNYL (PF) 50 mcg IVP once                              tl3 

21:35 Drug: Dilaudid 0.5 mg Route: IVP; Infused Over: 2 mins; Site: right forearm;            tl3 

23:08 Follow up: Response: No adverse reaction                                                tl3 

21:59 Drug: NS 0.9% 1000 ml Route: IV; Rate: 1 bolus; Site: right forearm;                    mg2 

23:08 Follow up: IV Status: Completed infusion; IV Intake: 1000ml                             tl3 

                                                                                                  

                                                                                                  

Intake:                                                                                           

23:08 IV: 1000ml; Total: 1000ml.                                                              tl3 

                                                                                                  

Outcome:                                                                                          

22:00 Discharge ordered by MD.                                                                gs  

23:00 Discharged to nursing home.                                                             tl3 

23:00 Condition: stable                                                                           

23:00 Discharge instructions given to patient.                                                    

                                                                                             

01:10 Patient left the ED.                                                                    tl3 

                                                                                                  

Signatures:                                                                                       

Dispatcher [employer]                           Judy Williamson RN                    RN   pedro Sharp Roya                                 mr                                                   

Flaco, Ana Luisa, RN                     RN   bb                                                   

Kayden, Sheridan                             ag1                                                  

Andrey Harper MD MD gs Lowrey, Tammy, RN                       RN   tl3                                                  

Miguel Skaggs, RN                    RN   mg2                                                  

Javier Chatterjee                              jp3                                                  

                                                                                                  

**************************************************************************************************

## 2019-01-19 VITALS — DIASTOLIC BLOOD PRESSURE: 80 MMHG | SYSTOLIC BLOOD PRESSURE: 120 MMHG | OXYGEN SATURATION: 100 %

## 2019-01-19 VITALS — TEMPERATURE: 98.4 F

## 2019-01-23 ENCOUNTER — HOSPITAL ENCOUNTER (EMERGENCY)
Dept: HOSPITAL 97 - ER | Age: 34
Discharge: HOME | End: 2019-01-23
Payer: COMMERCIAL

## 2019-01-23 VITALS — SYSTOLIC BLOOD PRESSURE: 119 MMHG | OXYGEN SATURATION: 96 % | DIASTOLIC BLOOD PRESSURE: 89 MMHG

## 2019-01-23 VITALS — TEMPERATURE: 98 F

## 2019-01-23 DIAGNOSIS — Z88.5: ICD-10-CM

## 2019-01-23 DIAGNOSIS — Z88.3: ICD-10-CM

## 2019-01-23 DIAGNOSIS — Q05.9: ICD-10-CM

## 2019-01-23 DIAGNOSIS — R10.9: Primary | ICD-10-CM

## 2019-01-23 DIAGNOSIS — Z88.1: ICD-10-CM

## 2019-01-23 DIAGNOSIS — Z88.8: ICD-10-CM

## 2019-01-23 DIAGNOSIS — I10: ICD-10-CM

## 2019-01-23 LAB
ALBUMIN SERPL BCP-MCNC: 3.1 G/DL (ref 3.4–5)
ALP SERPL-CCNC: 124 U/L (ref 45–117)
ALT SERPL W P-5'-P-CCNC: 115 U/L (ref 12–78)
AST SERPL W P-5'-P-CCNC: 39 U/L (ref 15–37)
BUN BLD-MCNC: 16 MG/DL (ref 7–18)
GLUCOSE SERPLBLD-MCNC: 88 MG/DL (ref 74–106)
HCT VFR BLD CALC: 46.6 % (ref 39.6–49)
LIPASE SERPL-CCNC: 82 U/L (ref 73–393)
LYMPHOCYTES # SPEC AUTO: 1.3 K/UL (ref 0.7–4.9)
PMV BLD: 8.4 FL (ref 7.6–11.3)
POTASSIUM SERPL-SCNC: 4.3 MMOL/L (ref 3.5–5.1)
RBC # BLD: 5.61 M/UL (ref 4.33–5.43)
TROPONIN (EMERG DEPT USE ONLY): < 0.02 NG/ML (ref 0–0.04)

## 2019-01-23 PROCEDURE — 83690 ASSAY OF LIPASE: CPT

## 2019-01-23 PROCEDURE — 96375 TX/PRO/DX INJ NEW DRUG ADDON: CPT

## 2019-01-23 PROCEDURE — 74177 CT ABD & PELVIS W/CONTRAST: CPT

## 2019-01-23 PROCEDURE — 99285 EMERGENCY DEPT VISIT HI MDM: CPT

## 2019-01-23 PROCEDURE — 85025 COMPLETE CBC W/AUTO DIFF WBC: CPT

## 2019-01-23 PROCEDURE — 93005 ELECTROCARDIOGRAM TRACING: CPT

## 2019-01-23 PROCEDURE — 36415 COLL VENOUS BLD VENIPUNCTURE: CPT

## 2019-01-23 PROCEDURE — 71045 X-RAY EXAM CHEST 1 VIEW: CPT

## 2019-01-23 PROCEDURE — 80048 BASIC METABOLIC PNL TOTAL CA: CPT

## 2019-01-23 PROCEDURE — 84484 ASSAY OF TROPONIN QUANT: CPT

## 2019-01-23 PROCEDURE — 96374 THER/PROPH/DIAG INJ IV PUSH: CPT

## 2019-01-23 PROCEDURE — 80076 HEPATIC FUNCTION PANEL: CPT

## 2019-01-23 NOTE — EDPHYS
Physician Documentation                                                                           

 [facility]                                                                

Name: Redd Dale Jr                                                                            

Age: 33 yrs                                                                                       

Sex: Male                                                                                         

: 1985                                                                                   

MRN: [id number]                                                                                   

Arrival Date: 2019                                                                          

Time: 14:19                                                                                       

Account#: [id number]                                                                            

Bed 5                                                                                             

Private MD:                                                                                       

ED Physician Anuel Berry                                                                         

HPI:                                                                                              

                                                                                             

15:00 This 33 yrs old Black Male presents to ER via EMS with complaints of Epigastric Pain.   pm1 

15:00 The patient presents with abdominal pain in the epigastric area. Onset: The             pm1 

      symptoms/episode began/occurred 3 day(s) ago. The symptoms do not radiate.                  

15:00 Associated signs and symptoms: Pertinent negatives: nausea, vomiting, and diarrhea,     pm1 

      chest pain, dysuria, fever, shortness of breath. The symptoms are described as sharp.       

      Modifying factors: The symptoms are alleviated by nothing, the symptoms are aggravated      

      by nothing. Severity of pain: in the emergency department the pain is actually worse.       

      The patient has experienced similar episodes in the past, multiple times. The patient       

      has not recently seen a physician.                                                          

                                                                                                  

Historical:                                                                                       

- Allergies:                                                                                      

14:27 Amoxicillin;                                                                            jl7 

14:27 Bactrim;                                                                                jl7 

14:27 Ciprofloxacin;                                                                          jl7 

14:27 CLAVULANIC ACID;                                                                        jl7 

14:27 Doxycycline;                                                                            jl7 

14:27 Levofloxacin;                                                                           jl7 

14:27 Morphine;                                                                               jl7 

14:27 Toradol;                                                                                jl7 

14:27 TRIMETHOPRIM;                                                                           jl7 

14:27 Vancomycin;                                                                             jl7 

14:27 Zofran;                                                                                 jl7 

- PMHx:                                                                                           

14:27 Asthma; Cerebral Palsy; cluster headaches; decubitus ulcers on feet; GERD;              jl7 

      Hydrocephalus; Hypertension; spina bifida;                                                  

- PSHx:                                                                                           

14:27 SHUNT REVISION ; AMPUTATION OF LOWER LIMB ;                                     jl7 

                                                                                                  

- Immunization history:: Adult Immunizations up to date.                                          

- Social history:: Smoking status: unknown.                                                       

- Ebola Screening: : No symptoms or risks identified at this time.                                

                                                                                                  

                                                                                                  

ROS:                                                                                              

15:00 Constitutional: Negative for fever, chills, and weight loss, Eyes: Negative for injury, pm1 

      pain, redness, and discharge, ENT: Negative for injury, pain, and discharge, Neck:          

      Negative for injury, pain, and swelling, Cardiovascular: Negative for chest pain,           

      palpitations, and edema, Respiratory: Negative for shortness of breath, cough,              

      wheezing, and pleuritic chest pain.                                                         

15:00 Back: Negative for injury and pain, : Negative for injury, bleeding, discharge, and       

      swelling, MS/Extremity: Negative for injury and deformity, Skin: Negative for injury,       

      rash, and discoloration.                                                                    

15:00 Neuro: Negative for headache, weakness, numbness, tingling, and seizure.                    

15:00 Abdomen/GI: Positive for abdominal pain, Negative for nausea, vomiting, and diarrhea.       

                                                                                                  

Exam:                                                                                             

15:00 Constitutional:  This is a well developed, well nourished patient who is awake, alert,  pm1 

      and in no acute distress. Head/Face:  Normocephalic, atraumatic. Eyes:  Pupils equal        

      round and reactive to light, extra-ocular motions intact.  Lids and lashes normal.          

      Conjunctiva and sclera are non-icteric and not injected.  Cornea within normal limits.      

      Periorbital areas with no swelling, redness, or edema. ENT:  Nares patent. No nasal         

      discharge, no septal abnormalities noted.  Tympanic membranes are normal and external       

      auditory canals are clear.  Oropharynx with no redness, swelling, or masses, exudates,      

      or evidence of obstruction, uvula midline.  Mucous membranes moist. Neck:  Trachea          

      midline, no thyromegaly or masses palpated, and no cervical lymphadenopathy.  Supple,       

      full range of motion without nuchal rigidity, or vertebral point tenderness.  No            

      Meningismus. Chest/axilla:  Normal chest wall appearance and motion.  Nontender with no     

      deformity.  No lesions are appreciated. Cardiovascular:  Regular rate and rhythm with a     

      normal S1 and S2.  No gallops, murmurs, or rubs.  No pulse deficits. Respiratory:           

      Lungs have equal breath sounds bilaterally, clear to auscultation and percussion.  No       

      rales, rhonchi or wheezes noted.  No increased work of breathing, no retractions or         

      nasal flaring. Abdomen/GI:  Soft, non-tender, with normal bowel sounds.  No distension      

      or tympany.  No guarding or rebound.  No evidence of tenderness throughout. Back:  No       

      spinal tenderness.  No costovertebral tenderness.  Full range of motion. Skin:  Warm,       

      dry with normal turgor.  Normal color with no rashes, no lesions, and no evidence of        

      cellulitis. MS/ Extremity:  Pulses equal, no cyanosis.  Neurovascular intact.  Full,        

      normal range of motion.                                                                     

                                                                                                  

Vital Signs:                                                                                      

14:27  / 90; Pulse 97; Resp 16 S; Temp 98(O); Pulse Ox 92% on R/A; Pain 10/10;          jl7 

15:19  / 88; Pulse 84; Resp 17; Pulse Ox 93% on R/A;                                    tw2 

17:17  / 89; Pulse 86; Resp 16 S; Pulse Ox 96% on R/A;                                  jl7 

                                                                                                  

MDM:                                                                                              

14:29 Patient medically screened.                                                             pm1 

16:33 Data reviewed: vital signs. Counseling: I had a detailed discussion with the patient    pm1 

      and/or guardian regarding: the historical points, exam findings, and any diagnostic         

      results supporting the discharge/admit diagnosis, lab results, radiology results, the       

      need for outpatient follow up, to return to the emergency department if symptoms worsen     

      or persist or if there are any questions or concerns that arise at home.                    

                                                                                                  

                                                                                             

14:37 Order name: Basic Metabolic Panel; Complete Time: 15:49                                 pm                                                                                             

14:37 Order name: CBC with Diff; Complete Time: 15:29                                         pm                                                                                             

14:37 Order name: XRAY Chest (1 view); Complete Time: 15:48                                   pm                                                                                             

14:37 Order name: LFT's; Complete Time: 15:49                                                 pm                                                                                             

14:37 Order name: Troponin (emerg Dept Use Only); Complete Time: 15:49                        pm                                                                                             

14:37 Order name: Lipase; Complete Time: 15:49                                                pm                                                                                             

14:37 Order name: EKG; Complete Time: 14:38                                                   pm                                                                                             

14:37 Order name: Cardiac monitoring; Complete Time: 15:03                                    pm                                                                                             

14:37 Order name: CT Abd/Pelvis - W/Contrast: IV contrast only; Complete Time: 16:30          pm                                                                                             

14:37 Order name: EKG - Nurse/Tech; Complete Time: 16:03                                      pm                                                                                             

14:37 Order name: IV Saline Lock; Complete Time: 15:03                                        pm                                                                                             

14:37 Order name: Labs collected and sent; Complete Time: 15:03                               pm                                                                                             

14:37 Order name: O2 Per Protocol; Complete Time: 15:03                                       pm                                                                                             

14:37 Order name: O2 Sat Monitoring; Complete Time: 15:02                                     pm1 

                                                                                                  

Administered Medications:                                                                         

15:40 Drug: Demerol 25 mg Route: IVP; Site: left forearm;                                     jl7 

16:35 Follow up: Response: No adverse reaction; Pain is unchanged, physician notified         jl7 

15:43 Drug: Phenergan 12.5 mg Route: IVP; Site: left forearm;                                 jl7 

16:00 Follow up: Response: No adverse reaction; Nausea is decreased                           jl7 

16:40 Drug: GI Cocktail without Donnatal - (Maalox Suspension 30 ml, Lidocaine Liquid 2 % 15  jl7 

      ml) Route: PO;                                                                              

17:10 Follow up: Response: No adverse reaction; Pain is decreased                             jl7 

16:40 Drug: Pepcid 20 mg Route: PO;                                                           jl7 

17:30 Follow up: Response: No adverse reaction; Pain is decreased                             jl7 

17:10 Not Given (Other Intervention Used): Pepcid 20 mg IVP once                              jl7 

                                                                                                  

                                                                                                  

Disposition:                                                                                      

19:12 Co-signature as Attending Physician, Anuel Berry MD.                                    rn  

                                                                                                  

Disposition:                                                                                      

19 16:34 Discharged to Home. Impression: Unspecified abdominal pain.                        

- Condition is Stable.                                                                            

- Discharge Instructions: Abdominal Pain, Adult.                                                  

                                                                                                  

- Medication Reconciliation Form, Thank You Letter, Antibiotic Education, Prescription            

  Opioid Use form.                                                                                

- Follow up: Emergency Department; When: As needed; Reason: Worsening of condition.               

  Follow up: Private Physician; When: 2 - 3 days; Reason: Recheck today's complaints,             

  Continuance of care, Re-evaluation by your physician. Follow up: Olvin Kitchen MD;           

  When: 2 - 3 days; Reason: Recheck today's complaints, Continuance of care,                      

  Re-evaluation by your physician.                                                                

- Problem is new.                                                                                 

- Symptoms have improved.                                                                         

                                                                                                  

                                                                                                  

                                                                                                  

Signatures:                                                                                       

Dispatcher [employer]                           EDMS                                                 

Anuel Berry MD MD rn Marinas, Patrick, HOWARD                    NP   pm1                                                  

Jama Bonilla RN                        RN   jl7                                                  

                                                                                                  

Corrections: (The following items were deleted from the chart)                                    

16:35 16:34 2019 16:34 Discharged to Home. Impression: Unspecified abdominal pain.      pm1 

      Condition is Stable. Forms are Medication Reconciliation Form, Thank You Letter,            

      Antibiotic Education, Prescription Opioid Use. Follow up: Emergency Department; When:       

      As needed; Reason: Worsening of condition. Follow up: Private Physician; When: 2 - 3        

      days; Reason: Recheck today's complaints, Continuance of care, Re-evaluation by your        

      physician. Problem is new. Symptoms have improved. pm1                                      

18:50 16:35 2019 16:34 Discharged to Home. Impression: Unspecified abdominal pain.      jl7 

      Condition is Stable. Discharge Instructions: Abdominal Pain, Adult. Forms are               

      Medication Reconciliation Form, Thank You Letter, Antibiotic Education, Prescription        

      Opioid Use. Follow up: Emergency Department; When: As needed; Reason: Worsening of          

      condition. Follow up: Private Physician; When: 2 - 3 days; Reason: Recheck today's          

      complaints, Continuance of care, Re-evaluation by your physician. Follow up: Olvin Kitchen; When: 2 - 3 days; Reason: Recheck today's complaints, Continuance of care,          

      Re-evaluation by your physician. Problem is new. Symptoms have improved. pm1                

                                                                                                  

**************************************************************************************************

## 2019-01-23 NOTE — XMS REPORT
Continuity of Care Document

 Created on:2018



Patient:JORDAN VERDIN JR

Sex:Male

:1985

External Reference #:[id number]





Demographics







 Address  [address] [place] [address] [address] [place] [id number]

 

 Phone  [phone]

 

 Phone  [phone]

 

 Preferred Language  Unknown

 

 Marital Status  Unknown

 

 Religion Affiliation  Unknown

 

 Race  Unknown

 

 Ethnic Group  Unknown









Author







 Organization  [facility]









Problems







 Problem  Status  Onset  Classification  Date  Comments  Source



     Date    Reported    



             



             



             

 

 HEADACHE  Active  20        [address]



             

 

 SHUNT  Active  20        [address]



             

 

 ACUTE HEADACHE  Active  20        [address]



             

 

 Pressure ulcer  Active  20  Finding  2018    [facility]



 of right ankle,    18        [facility]



 stage 3            [place]



             



             

 

 Nausea &  Active  20  Finding  2018    [facility]



 vomiting    18        [facility]



             [place]



             



             

 

 Nausea and  Active  20  Finding  2018    [facility]



 vomiting    18        [facility]



             [place]



             



             

 

 Decubitus ulcer  Active  20  Finding  2018    [facility]



 of right ankle,    18        [facility]



 stage 3            [place]



             



             

 

 Chronic  Active  20  Finding  2018    [facility]



 indwelling Ortega    17        [facility]



 catheter            [place]



             



             

 

 Sacral ulcer  Resolved  20  Finding  2018    [facility]



     17        [facility]



             [place]



             



             

 

 Heel ulcer  Active  20  Finding  2018    [facility]



     17        [facility]



             [place]



             



             

 

 Cellulitis  Resolved  20  Finding  2018    [facility]



     17        [facility]



             [place]



             



             

 

 Lymphedema  Active  20  Finding  2018    [facility]



     17        [facility]



             [place]



             



             

 

 Chronic pain  Active  20  Finding  2018    [facility]



 disorder    17        [facility]



             [place]



             



             

 

 GERD  Active  20  Finding  2018    [facility]



     17        [facility]



             [place]



             



             

 

 Hypertension  Active  20  Finding  2018    [facility]



     17        [facility]



             [place]



             



             

 

 Spina bifida  Active  20  Finding  2018    [facility]



     17        [facility]



             [place]



             



             

 

 Stage II  Resolved  20  Finding  2018    [facility]



 pressure ulcer    16        [facility]



 of buttock            [place]



             



             

 

 Urinary tract  Resolved  07/22/20  Finding  2018    [facility]



 infectious    16        [facility]



 disease            [place]



             



             

 

 Stage IV  Active  20  Finding  2018    [facility]



 pressure ulcer    16        [facility]



 of heel            [place]



             



             

 

 Paraplegic  Active  20  Finding  2018    [facility]



 spinal paralysis    16        [facility]



             [place]



             



             

 

 Malaise  Active  10/16/20  Finding  2018    [facility]



     15        [facility]



             [place]



             



             

 

 Wound of left  Resolved  10/16/20  Finding  2018    [facility]



 ankle    15        [facility]



             [place]



             



             

 

 Leukocytosis  Active  10/16/20  Finding  2018    [facility]



     15        [facility]



             [place]



             



             

 

 Rectal  Resolved  10/16/20  Finding  2018    [facility]



 hemorrhage    15        [facility]



             [place]



             



             

 

 Decubitus ulcer,  Resolved  20  Finding  2018    [facility]



 infected    14        [facility]



             [place]



             



             

 

 Stage III  Active    Finding  2018    [facility]



 pressure ulcer            [facility]



             [place]



             



             

 

 Ulcer of scrotum  Resolved    Finding  2018    [facility]



             [facility]



             [place]



             



             

 

 Poor social  Active    Finding  2018    [facility]



 situation            [facility]



             [place]



             



             

 

 Pressure ulcer  Active    Finding  2018    [facility]



 of thigh, stage            [facility]



 3            [place]



             



             

 

 Stage III  Resolved    Finding  2018    [facility]



 pressure ulcer            [facility]



 of heel            [place]



             



             

 

 Pressure ulcer,  Inactive    Finding  2018    [facility]



 stage 3            [facility]



             [place]



             



             

 

 Stage II  Resolved    Finding  2018    [facility]



 pressure ulcer            [facility]



 of left ankle            [place]



             



             

 

 Stage III  Active    Finding  2018    [facility]



 pressure ulcer            [facility]



 of left ankle            [place]



             



             

 

 Stage II  Resolved    Finding  2018    [facility]



 pressure ulcer            [facility]



 of right ankle            [place]



             



             

 

 History of  Active    Finding  2018    [facility]



 Clostridium            [facility]



 difficile            [place]



 colitis            



             

 

 Pressure ulcer  Resolved    Finding  2018    [facility]



 of right leg,            [facility]



 stage 2            [place]



             



             

 

 Pressure ulcer  Active    Finding  2018    [facility]



 of right leg,            [facility]



 stage 3            [place]



             



             

 

 Asymptomatic  Active    Finding  2018    [facility]



 bacteriuria            [facility]



             [place]



             



             

 

 Ulcer of toe  Resolved    Finding  2018    [facility]



             [facility]



             [place]



             



             

 

 Incontinence  Active    Finding  2018    [facility]



 without sensory            [facility]



 awareness            [place]



             



             

 

 Unstageable  Resolved    Finding  2018    [facility]



 pressure sore            [facility]



             [place]



             



             

 

 Abdominal pain  Inactive    Finding  2018    [facility]



             [facility]



             [place]



             



             

 

 Cellulitis of  Resolved    [facility]  2018    [facility]



 heel, left            [facility]



             [place]



             



             

 

 Clostridium  Resolved    Finding  2018    [facility]



 difficile            [facility]



 colitis            [place]



             



             

 

 Acute pain  Active    Problem  2018    [facility]



             

 

 Asthma  Resolved    Problem  2018    [facility]



             

 

 Bronchitis  Resolved    Problem  2018    [facility]



             

 

 Cerebral palsy  Resolved    Problem  2018    [facility]



             

 

 Headache  Active    Problem  2018    [facility]



             

 

 Hydrocephalus  Resolved    Problem  2018    [facility]



             

 

 Osteomyelitis  Resolved    Problem  2018    [facility] 



             Pearlmary b[facility] -



             Nick,M



             [facility]



             

 

 HEADACHE  Active          [facility]



             







Medications







 Medication  Details  Route  Status  Patient  Ordering  Order  Source



         Instructions  Provider  Date  



               



               



               

 

 Docusate Sodium  100 mg=1 cap,    Active         [place]



 100 MG Oral  PO, BID, 0            [facility]



 Capsule  Refill(s)            Post



               



               

 

 Zosyn  0 Refill(s)    Active        [facility] [place]



               [facility]



               



               

 

 celecoxib 200  200 mg=1 cap,    Active      [facility] [place]



 mg oral capsule  PO, BID, 0          2018  [facility]



   Refill(s)            Post



               



               

 

 ascorbic acid  500 mg=1 tab,    Active      [facility] [place]



   PO, BID, 0            Medical



   Refill(s)            Post



               



               

 

 acetaminophen  1,000 mg=2 tab,    Active      [facility] [place]



 500 mg oral  PO, Q6Hnow, 0            Medical



 tablet  Refill(s)            Post



               



               

 

 Oxycodone  5 mg=1 tab, PO,    Active      [facility] [place]



 Hydrochloride 5  Q4H, PRN Pain          2018  Medical



 MG Oral Tablet  Score 4-6, 0            Center



   Refill(s)            



               



               

 

 zinc sulfate  220 mg=1 cap,    Active      [facility] [place]



 220 mg oral  PO, Daily, 0          2018  Medical



 capsule  Refill(s)            Post



               



               

 

 multivitamin  1 tab, PO,    Active      [facility] [place]



   Daily, 0          2018  Medical



   Refill(s)            Post



               



               

 

 methocarbamol  1,000 mg=2 tab,    Active      [facility] [place]



 500 mg oral  PO, Q8H, 0          2018  Medical



 tablet  Refill(s)            Center



               



               

 

 LORazepam 0.5  0.5 mg=1 tab,    Active         [place]



 mg oral tablet  PO, Q8H, PRN          2018  Medical



   Anxiety, 0            Center



   Refill(s)            



               



               

 

 Lidocaine  3 patch, TOP,    Active         [place]



 Hydrochloride  Daily, Remove          2018  Medical



 0.05 MG/MG  after 12 hours,            Center



 Transdermal  0 Refill(s)            



 Patch              



 [Lidoderm]              



               



               

 

 Robaxin  1,000 mg, 2    No Longer        [facility]



   tab, Route: PO,    Active      2018  [facility]



   Drug form: TAB,            Center



   Q8H, Dosing            



   Weight 127.027,            



   kg, Start date:            



   18            



   16:00:00 CDT,            



   Duration: 30            



   day, Stop date:            



   18            



   8:00:00            



   CDTNotes: (Same            



   as:Robaxin)            



               



               

 

 Oxycodone  10 mg, 2 tab,    No Longer         [place]



 Hydrochloride 5  Route: PO, Drug    Active      2018  [facility]



 MG Oral Tablet  form: TAB,            Center



   Daily, Dosing            



   Weight 127.027,            



   kg, PRN            



   Procedure,            



   Start date:            



   18            



   13:06:00 CDT,            



   Duration: 30            



   day, Stop date:            



   18            



   13:05:00            



   CDTNotes: (Same            



   as: Roxicodone)            



               



               

 

 Ativan  0.5 mg, 1 tab,    No Longer        [facility]



   Route: PO, Drug    Active      2018  Medical



   form: TAB, Q8H,            Center



   Dosing Weight            



   127.027, kg,            



   PRN Anxiety,            



   Start date:            



   18            



   10:18:00 CDT,            



   Duration: 7            



   day, Stop date:            



   18            



   10:17:00            



   CDTNotes: (Same            



   as: Ativan)            



               



               

 

 Trazodone  50 mg, 1 tab,    No Longer        [facility] [place]



 Hydrochloride  Route: PO, Drug    Active      2018  Medical



 50 MG Oral  form: TAB,            Center



 Tablet  Bedtime, Dosing            



   Weight 127.027,            



   kg, Start date:            



   18            



   21:00:00 CDT,            



   Duration: 30            



   day, Stop date:            



   18            



   21:00:00            



   CDTNotes: (Same            



   As: Desyrel)            



               



               

 

 remove patch  3 patch, Route:    No Longer         [place]



   TOP, Bedtime,    Active        Medical



   Drug form:            Center



   ERFILM, Start            



   date: 18            



   21:00:00 CDT,            



   Duration: 30            



   day, Stop date:            



   18            



   21:00:00            



   CDTNotes:            



   Remove patch 12            



   hours after            



   application            



   each day.            



               



               

 

 Oxycodone  10 mg, 2 tab,    Inactive        [facility] [place]



 Hydrochloride 5  Route: PO, Drug          2018  Medical



 MG Oral Tablet  form: TAB,            Center



   ONCE, Dosing            



   Weight 127.027,            



   kg, Start date:            



   18            



   17:01:00 CDT,            



   Stop date:            



   18            



   17:01:00            



   CDTNotes: (Same            



   as: Roxicodone)            



               



               

 

 Celebrex  200 mg, 1 cap,    No Longer        [facility]



   Route: PO, Drug    Active      2018  Medical



   form: CAP, BID,            Post



   Dosing Weight            



   127.027, kg,            



   Start date:            



   18            



   17:00:00 CDT,            



   Duration: 30            



   day, Stop date:            



   18            



   9:00:00            



   CDTNotes:            



   NSAID. Please            



   check            



   indication. Not            



   for seizure.            



   (Same As:            



   CeleBREX)            



               



               

 

 Vancomycin  1,500 mg, 250    No Longer         [place]



   mL, Route:    Active      2018  Medical



   IVPB, Drug            Center



   form: INJ,            



   [id number], Dosing            



   Weight 127.27,            



   kg, Start date:            



   18            



   16:00:00 CDT,            



   Stop date:            



   05/10/18            



   8:00:00 CDT,            



   ABX Indication:            



   Skin/Soft            



   Tissue            



   InfectionNotes:            



   TIME CRITICAL            



   MEDICATION Same            



   as: Vancocin-NS            



   (premixed)            



   Infusion rate            



   2001 mg: infuse            



   over 2.5 hours            



               



               

 

 Lidocaine  3 patch, Route:    No Longer        [facility] [place]



 Hydrochloride  TOP, Daily,    Active        Medical



 0.05 MG/MG  Drug form:            Post



 Transdermal  FILM, Start            



 Patch  date: 18            



 [Lidoderm]  9:00:00 CDT,            



   Duration: 7            



   day, Stop date:            



   18            



   9:00:00 CDT,            



   Remove after 12            



   hoursNotes:            



   Apply only once            



   for up to 12            



   hours in a            



   24-hour period            



   (12 hours on            



   and 12 hours            



   off). (Same as:            



   Lidoderm)            



   "Remove old            



   patch before            



   application of            



   new patch"            



               



               

 

 Phenergan  12.5 mg, 0.5    Inactive         [place]



   mL, Route:          2018  Medical



   IVPB, Drug            Center



   form: INJ,            



   ONCE, Dosing            



   Weight 127.027,            



   kg, Priority:            



   NOW, Start            



   date: 18            



   17:40:00 CDT,            



   Stop date:            



   18            



   17:40:00            



   CDTNotes: Do            



   not give IV            



   push.  (Same            



   as: Phenergan)            



               



               

 

 Dilaudid  0.5 mg, 0.25    Inactive       [place]



   mL, Route: IVP,            Medical



   Drug form: INJ,            Center



   ONCE, Dosing            



   Weight 127.027,            



   kg, Priority:            



   NOW, Start            



   date: 18            



   17:40:00 CDT,            



   Stop date:            



   18            



   17:40:00            



   CDTNotes: Same            



   as Dilaudid            



               



               

 

 Tramadol  100 mg, 2 tab,    No Longer        [facility]



   Route: PO, Drug    Active        Medical



   form: TAB,            Center



   Q6Hnow, Dosing            



   Weight 127.027,            



   kg, Start date:            



   18            



   17:00:00 CDT,            



   Duration: 30            



   day, Stop date:            



   18            



   11:00:00            



   CDTNotes: Not            



   to exceed            



   400mg/day.            



   (Same As:            



   Ultram)            



               

 

 gabapentin  600 mg, 2 cap,    No Longer        [facility]



   Route: PO, Drug    Active        Medical



   form: CAP,            Center



   Q8Hnow, Dosing            



   Weight 127.027,            



   kg, Start date:            



   18            



   17:00:00 CDT,            



   Stop date:            



   18            



   9:00:00            



   CDTNotes: (Same            



   as: Neurontin)            



               



               

 

 Acetaminophen  1,000 mg, 2    No Longer        [facility]



   tab, Route: PO,    Active        Medical



   Drug form: TAB,            Center



   Q6Hnow, Dosing            



   Weight 127.027,            



   kg, Start date:            



   18            



   17:00:00 CDT,            



   Duration: 30            



   day, Stop date:            



   18            



   11:00:00            



   CDTNotes: Max            



   acetaminophen            



   4000 mg/day (4            



   gm/day).  (Same            



   as: Tylenol            



   Extra Strength)            



               



               

 

 Robaxin  500 mg, 1 tab,    No Longer        [facility]



   Route: PO, Drug    Active        Medical



   form: TAB, TID,            Center



   Dosing Weight            



   127.027, kg,            



   Start date:            



   18            



   17:00:00 CDT,            



   Duration: 30            



   day, Stop date:            



   18            



   13:00:00            



   CDTNotes: (Same            



   as:Robaxin)            



               



               

 

 Oxycodone  5 mg, 1 tab,    No Longer        [facility] [place]



 Hydrochloride 5  Route: PO, Drug    Active      2018  Medical



 MG Oral Tablet  form: TAB, Q4H,            Center



   Dosing Weight            



   127.027, kg,            



   PRN Pain Score            



   4-6, Start            



   date: 18            



   16:33:00 CDT,            



   Duration: 30            



   day, Stop date:            



   18            



   16:32:00            



   CDTNotes: (Same            



   as: Roxicodone)            



               



               

 

 Beneprotein 7  2 pkt, Route:    No Longer         [place]



 gm pkt  PO, Drug Form:    Active      2018  Medical



   PWDR, Dosing            Center



   Weight 127.027,            



   kg, BID-Before            



   Meals, Start            



   date: 18            



   16:30:00 CDT,            



   Duration: 30            



   day, Stop date:            



   18            



   7:30:00            



   CDTNotes: (Same            



   as:            



   Beneprotein)            



               



               

 

 Acetaminophen  1 tab, PO, TID,    No Longer         [place]



 325 MG /  0 Refill(s)    Active      2018  Medical



 Oxycodone              Center



 Hydrochloride              



 10 MG Oral              



 Tablet              



 [Percocet              



 10/325]              



               

 

 Dilaudid  0.5 mg, 0.25    Inactive       [place]



   mL, Route: IVP,          2018  Medical



   Drug form: INJ,            Center



   ONCE, Start            



   date: 18            



   11:01:00 CDT,            



   Stop date:            



   18            



   11:01:00 CDT            



               



               

 

 Phenergan  25 mg, 1 tab,    Inactive        [facility]



   Route: PO, Drug          2018  Medical



   form: TAB, Q6H,            Center



   Dosing Weight            



   127.027, kg,            



   PRN Nausea &            



   Vomiting, Start            



   date: 18            



   10:43:00 CDT,            



   Duration: 30            



   day, Stop date:            



   18            



   10:42:00            



   CDTNotes: (Same            



   as: Phenergan)            



               



               

 

 Dilaudid  2 mg, Route:    Inactive         [place]



   IVP, ONCE,          2018  Medical



   Dosing Weight            Center



   127.027, kg,            



   Priority: STAT,            



   Start date:            



   18            



   10:43:00 CDT,            



   Stop date:            



   18            



   10:43:00 CDT            



               



               

 

 Docusate  100 mg, 1 cap,    No Longer        [facility]



   Route: PO, Drug    Active      2018  Medical



   form: CAP, BID,            Center



   Dosing Weight            



   127.27, kg,            



   Start date:            



   18            



   9:00:00 CDT,            



   Duration: 30            



   day, Stop date:            



   18            



   17:00:00            



   CDTNotes: (Same            



   as: Colace) (Do            



   Not Crush)            



               



               

 

 Zinc Sulfate  220 mg, 1 cap,    No Longer       [place]



   Route: PO, Drug    Active      2018  Medical



   form: CAP,            Center



   Daily, Dosing            



   Weight 127.27,            



   kg, Start date:            



   18            



   9:00:00 CDT,            



   Duration: 30            



   day, Stop date:            



   18            



   9:00:00            



   CDTNotes: (Zinc            



   sulfate            



   capsule) - 220            



   mg Zinc            



   sulfate=50 mg            



   elemental zinc            



   Same as Zinc            



   Sulfate            



               

 

 ascorbic acid  500 mg, 1 tab,    No Longer       [place]



   Route: PO, Drug    Active        Medical



   form: TAB, BID,            Center



   Dosing Weight            



   127.27, kg,            



   Start date:            



   18            



   9:00:00 CDT,            



   Duration: 30            



   day, Stop date:            



   18            



   17:00:00            



   CDTNotes: (Same            



   as: Vitamin C)            



               



               

 

 multivitamin  1 tab, Route:    No Longer       [place]



   PO, Drug Form:    Active      2018  Medical



   TAB, Dosing            Center



   Weight 127.27,            



   kg, Daily,            



   Start date:            



   18            



   9:00:00 CDT,            



   Duration: 30            



   day, Stop date:            



   18            



   9:00:00            



   CDTNotes: (Same            



   as:Thera)            



   WASTE: F/P -            



   Black; E -            



   Municipal Trash            



   Bin  Take with            



   food.            



               

 

 Naproxen  500 mg, 1 tab,    Inactive       [place]



   Route: PO, Drug            Medical



   form: TAB,            Center



   [id number], Dosing            



   Weight 127.27,            



   kg, Start date:            



   18            



   2:00:00 CDT,            



   Duration: 30            



   day, Stop date:            



   18            



   14:00:00            



   CDTNotes: (Same            



   as: Naprosyn)            



   Take with food.            



               



               

 

 Zosyn  3.375 gm,    No Longer        [facility]



   Route: IVPB,    Active      2018  Medical



   Drug form:            Center



   PDR/INJ,            



   ABXQ8H, Dosing            



   Weight 127.27,            



   kg, Start date:            



   18            



   2:00:00 CDT,            



   Stop date:            



   05/10/18            



   10:00:00 CDT,            



   ABX Indication:            



   Skin/Soft            



   Tissue            



   InfectionNotes:            



   (Same as:            



   Zosyn) Dosing            



   based on            



   Piperacillin            



   component   ***            



   MEDICATION            



   WASTE ***            



   Product Size:            



   3375 mg Product            



   Wasted:  ___ mg            



               



               

 

 Vancomycin  1,000 mg,    No Longer         [place]



   Route: IVPB,    Active        Medical



   Drug form: INJ,            Center



   ABXQ8H, Dosing            



   Weight 127.27,            



   kg, Start date:            



   18            



   2:00:00 CDT,            



   Duration: 7            



   day, Stop date:            



   05/10/18            



   18:00:00 CDT,            



   ABX Indication:            



   Skin/Soft            



   Tissue            



   InfectionNotes:            



   TIME CRITICAL            



   MEDICATION            



   (Same As:            



   Vancocin)            



   Infusion rate            



   2001 mg: infuse            



   over 2.5 hours            



   For adult            



   patients only:            



   Round to            



   nearest 250 mg            



   per Medical            



   Staff approval            



    *** MEDICATION            



   WASTE ***            



   Product Size:            



   1000 mg Product            



   Wasted:  ___ mg            



               



               

 

 Enoxaparin  40 mg, 0.4 mL,    No Longer         [place]



   Route: SUB-Q,    Active        Medical



   Drug form: INJ,            Center



   [id number],            



   Dosing Weight            



   127.27, kg,            



   Consider for            



   obese patients,            



   Start date:            



   18            



   2:00:00 CDT,            



   Stop date:            



   18            



   14:00:00            



   CDTNotes: (Same            



   as: Lovenox)            



               



               

 

 Sodium Chloride  1,000 mL, Rate:    No Longer       [place]



 0.9% IV 1,000  125 ml/hr,    Active        Medical



 mL  Infuse over: 8            Center



   hr, Route: IV,            



   Dosing Weight            



   127.27 kg,            



   Total Volume:            



   1,000, Start            



   date: 18            



   1:46:00 CDT,            



   Duration: 30            



   day, Stop date:            



   18            



   1:45:00 CDT,            



   2.44, m2            



               

 

 Saline Flush  10 ml, Route:    No Longer         [place]



 0.9%  IVP, Drug Form:    Active        Medical



   INJ, Dosing            Center



   Weight 127.27,            



   kg, PRN, PRN            



   Line Flush,            



   Start date:            



   18            



   1:46:00 CDT,            



   Duration: 30            



   day, Stop date:            



   18            



   1:45:00            



   CDTNotes: (Same            



   as: BD            



   Posiflush)            



               



               

 

 Acetaminophen  325 mg, 1 tab,    Inactive         [place]



   Route: PO, Drug          2018  Medical



   form: TAB, Q4H,            Center



   Dosing Weight            



   127.27, kg, PRN            



   Pain Score 4-6,            



   Start date:            



   18            



   1:46:00 CDT,            



   Duration: 30            



   day, Stop date:            



   18            



   1:45:00            



   CDTNotes: Do            



   not exceed 4            



   gm/day.  (Same            



   as: Tylenol)            



               



               

 

 Acetaminophen  1 tab, Route:    Inactive         [place]



 325 MG /  PO, Drug Form:          2018  Medical



 Hydrocodone  TAB, Dosing            Center



 Bitartrate 5 MG  Weight 127.27,            



 Oral Tablet  kg, Q4H, PRN            



   Pain Score 4-6,            



   Start date:            



   18            



   1:46:00 CDT,            



   Duration: 30            



   day, Stop date:            



   18            



   1:45:00            



   CDTNotes: (Same            



   as: Norco            



   325/5)  Do not            



   exceed 4gm/day            



   of            



   acetaminophen.            



               



               

 

 Reglan  10 mg, 2 mL,    Inactive         [place]



   Route: IVP,            Medical



   Drug form: INJ,            Center



   ONCE, Dosing            



   Weight 127.273,            



   kg, Priority:            



   STAT, Start            



   date: 18            



   23:27:00 CDT,            



   Stop date:            



   18            



   23:27:00            



   CDTNotes: (Same            



   as: Reglan)            



               



               

 

 Benadryl  25 mg, 0.5 mL,    Inactive         [place]



   Route: IVP,          2018  Medical



   Drug form: INJ,            Center



   ONCE, Dosing            



   Weight 127.273,            



   kg, Priority:            



   STAT, Start            



   date: 18            



   23:27:00 CDT,            



   Stop date:            



   18            



   23:27:00            



   CDTNotes: (Same            



   as: Benadryl)            



               



               

 

 Magnesium  2 gm, 50 mL,    Inactive         [place]



 Sulfate  Route: IV, Drug            Medical



   form: INJ,            Center



   ONCE, Dosing            



   Weight 127.273,            



   kg, Priority:            



   STAT, Start            



   date: 18            



   23:26:00 CDT,            



   Stop date:            



   18            



   23:26:00            



   CDTNotes:            



   WASTE: F/P -            



   Sink; E -            



   Municipal Trash            



   Bin            



               

 

 Sodium Chloride  1,000 mL, 1000    Inactive        [facility] [place]



 0.9% (Bolus) IV  ml/hr, Infuse          2018  Medical



   Over: 1 hr,            Center



   Route: IV,            



   1,000, Drug            



   form: INJ,            



   ONCE, Priority:            



   STAT, Dosing            



   Weight 127.273            



   kg, Start date:            



   18            



   23:26:00 CDT,            



   Stop date:            



   18            



   23:26:00 CDT            



               



               

 

 Zosyn  4.5 gm, Route:    Inactive         [place]



   IVPB, ONCE,          2018  Medical



   Dosing Weight            Center



   127.273, kg,            



   Priority: STAT,            



   Start date:            



   18            



   23:10:00 CDT,            



   Stop date:            



   18            



   23:10:00 CDT,            



   ABX Indication:            



   Bacteremia            



               



               

 

 Vancomycin  2,000 mg,    Inactive         [place]



   Route: IVPB,          2018  Medical



   ONCE, Dosing            Center



   Weight 127.273,            



   kg, Priority:            



   STAT, Start            



   date: 18            



   23:10:00 CDT,            



   Stop date:            



   18            



   23:10:00 CDT,            



   ABX Indication:            



   BacteremiaNotes            



   : TIME CRITICAL            



   MEDICATION            



   (Same As:            



   Vancocin)            



   Infusion rate            



   2001 mg: infuse            



   over 2.5 hours            



   For adult            



   patients only:            



   Round to            



   nearest 250 mg            



   per Medical            



   Staff approval            



    *** MEDICATION            



   WASTE ***            



   Product Size:            



   1000 mg Product            



   Wasted:  ___ mg            



               



               

 

 normal saline  1,000 mL, Rate:    No Longer       [place]



 0.9% IV 1,000  75 ml/hr,    Active      2018  Medical



 mL  Infuse over:            Center



   13.3 hr, Route:            



   IV, Dosing            



   Weight 127.273            



   kg, Total            



   Volume: 1,000,            



   Start date:            



   18            



   22:39:00 CDT,            



   Duration: 30            



   day, Stop date:            



   18            



   22:38:00 CDT,            



   2.36, m2            



               

 

 Acetaminophen  1,000 mg, 2    Inactive         [place]



   tab, Route: PO,          2018  Medical



   Drug form: TAB,            Center



   ONCE, Dosing            



   Weight 127.273,            



   kg, Start date:            



   18            



   21:56:00 CDT,            



   Stop date:            



   18            



   21:56:00            



   CDTNotes: Max            



   acetaminophen            



   4000 mg/day (4            



   gm/day).  (Same            



   as: Tylenol            



   Extra Strength)            



               



               

 

 Rocephin  1 gm, Route:    Inactive      05/04[facility]



   IVP, Drug form:          2018  Medical



   PDR/INJ, ONCE,            Center



   Dosing Weight            



   127.273, kg,            



   Priority: STAT,            



   Start date:            



   18            



   21:21:00 CDT,            



   Stop date:            



   18            



   21:21:00 CDT,            



   ABX Indication:            



   Urinary Tract            



   InfectionNotes:            



   (Same As:            



   Rocephin).            



   *** MEDICATION            



   WASTE ***            



   Product Size:            



   1000 mg Product            



   Wasted:  _0__            



   mg            



               

 

 Morphine  4 mg, Route:    Inactive        [facility]



   IVP, ONCE,          2018  Medical



   Dosing Weight            Center



   127.273, kg,            



   Priority: STAT,            



   Start date:            



   18            



   19:05:00 CDT,            



   Stop date:            



   18            



   19:05:00 CDT            



               



               

 

 Benadryl  25 mg, 0.5 mL,    Inactive        [facility]



   Route: IVP,            Medical



   Drug form: INJ,            Center



   ONCE, Dosing            



   Weight 127.273,            



   kg, Priority:            



   STAT, Start            



   date: 18            



   18:14:00 CDT,            



   Stop date:            



   18            



   18:14:00            



   CDTNotes: (Same            



   as: Benadryl)            



               



               

 

 Benadryl  50 mg, 2 cap,    Inactive      [facility]



   Route: PO, Drug            Medical



   form: CAP,            Center



   ONCE, Dosing            



   Weight 127.273,            



   kg, Priority:            



   STAT, Start            



   date: 18            



   17:56:00 CDT,            



   Stop date:            



   18            



   17:56:00            



   CDTNotes: (Same            



   as: Benadryl)            



               



               

 

 Morphine  4 mg, 1 mL,    Inactive      [facility]



   Route: IVP,            Medical



   Drug form:            Center



   SOLN, ONCE,            



   Dosing Weight            



   127.273, kg,            



   Priority: STAT,            



   Start date:            



   18            



   17:56:00 CDT,            



   Stop date:            



   18            



   17:56:00 CDT            



               



               

 

 Pantoprazole  DAILY AT 0630    Active    [facility]



             2016  [facility]



               [place]



               



               

 

 Metronidazole  Q8H    Active    [facility]



             2016  [facility]



               [place]



               



               

 

 Codeine/Apap  EVERY 6 HOURS    Active    [facility]



   AS NEEDED PRN            [facility]



   For [facility]            [place]



               



               

 

 Oxycodone  FOUR TIMES    Active        [facility]



 Hcl/Acetaminoph  DAILY          2016  [facility]



 [facility]              [place]



               



               

 

 Meropenem  Q8H    Active        [facility]



               [facility]



               [place]



               



               

 

 Collagenase  DAILY    Active    Granda    [facility]



               [facility]



               [place]



               



               

 

 Ciprofloxacin  Q12H    Active    Todd  09/10/  [facility]



 400mg Iv              [facility]



               [place]



               



               

 

 Amikacin Sulf  DAILY    Active    Todd  09/10/  [facility]



               [facility]



               [place]



               



               

 

 Linezolid  TWICE DAILY    Inactive    Granda    [facility]



               [facility]



               [place]



               



               







Allergies, Adverse Reactions, Alerts







 Substance  Category  Reaction  Severity  Reaction  Status  Date  Comments  
Source



         type    Reported    



                 



                 



                 

 

 levofloxacin    Hives  Moderate  Allergy to  Active      [facility]



         Substance    8    [facility]



                 [place]



                 [facility]



                 



                 

 

 sulfamethoxa    Hives  Moderate  Allergy to  Active      [facility]



 zole        Substance    8    [facility]



                 [facility]



                 



                 

 

 ketorolac    Nausea/Vo    Allergy to  Active      [facility]



 tromethamine    miting    Substance    8    [facility]



                 [facility]



                 



                 

 

 vancomycin    Hives    Allergy to  Active      [facility]



         Substance    8    [facility]



                 [facility]



                 



                 

 

 ondansetron    Nausea/Vo  Mild  Propensity  Active      [facility]



     miting    to adverse    8    [facility]



         [facility]



                 [facility]



                 



                 

 

 ciprofloxaci    Nausea/Vo    Propensity  Active      [facility]



 n    miting    to adverse    8    [facility]



         [facility]        [place]



                 [facility]



                 



                 

 

 doxycycline    Nausea/Vo    Propensity  Active      [facility]



     miting    to adverse    8    [facility]



         [facility]        [place]



                 [facility]



                 



                 

 

 morphine  Assertion      Drug  Active      [facility]



                 



                 

 

 amoxicillin  Assertion      Drug  Active      [facility]



                 



                 

 

 Toradol  Assertion      Drug  Active      [facility]



                 



                 

 

 Minocin  Assertion      Drug  Active      [facility]



                 



                 

 

 Zofran  Assertion      Drug  Active      [facility]



                 



                 

 

 Levaquin  Assertion      Drug  Active      [facility]



                 



                 

 

 Bactrim  Assertion      Drug  Active      [facility]



                 



                 







Immunizations







 Immunization  Date Given  Site  Status  Last Updated  Comments  Source



             



             







Results







 Order Name  Results  Value  Reference  Date  Interpretation  Comments  Source



       [facility]        



               



               



               

 

 Brain-Outs  Brain-Outsid  EXAM: CT BRAIN WITHOUT CONTRAST -- OUTSIDE CONSULT  
      -  [facility]



 jonathan  e Consult     -  Medical



 Consult CT            This report was dictated by a Radiology Resident/Fellow.
  I have personally reviewed the images as  Center



             well as the Resident's interpretation and agree with the findings.
  



     DATE: 2018 4:49 AM CST        Read by:  Mauricio Cortes MD  
        Resident:  Mauricio Cortes MD  



             Dictated Date/time:  18 04:58  



             Electronically Signed by:  Radha Addison MD              
   18 07:46  



             FINAL REPORT  



     INDICATION: " - PAIN"          



               



               



               



     COMPARISON: Noncontrast head CTs 2018, 2015, 2013       
   



               



               



               



     TECHNIQUE: Noncontrast outside hospital CT submitted for 2nd 
interpretation. 205 images. Imaging was performed at [facility] on 2018.          



               



     IV contrast: None.          



               



               



               



     FINDINGS:          



               



               



               



     Overall unchanged exam. Right transfrontal ventriculostomy catheter is 
unchanged in position. 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 
size and configuration. The abandoned right transparie          



     hudson catheter is unchanged. Stigmata of Chiari II malformation.          



               



               



               



     There is no intracranial hemorrhage or extra-axial collection.          



               



               



               



     No new parenchymal density abnormality. No mass or mass effect. Unchanged 
from the tonsillar herniation.          



               



               



               



     The density of the larger intracranial sinuses is normal.          



               



               



               



               



               



     IMPRESSION: Unchanged examination compared to 2018          



               



               



               



     The final report agrees with the preliminary report by the radiology 
resident.          



               



               



               



               

 

 CHEM PANEL  B/C Ratio  17  6 - 25        [address]



               



               



               

 

 CHEM PANEL  Globulin  4.3 g/dL  2.7 - 4.2        [address]



               



               



               

 

 CHEM PANEL  A/G Ratio  0.7  0.7 - 1.6        [address]



               



               



               

 

 CHEM PANEL  AGAP  14.4 meq/L  10.0 -        [facility]



       20.0        [facility]



               



               



               

 

 CHEM PANEL  eGFR  113        Result Comment: The eGFR is calculated using 
the CKD-EPI formula. In most young, healthy individuals the eGFR will be >90 mL/
min/1.73m2. The eGFR declines with age. An eGFR of 60-89 may be normal in  [facility] 
[place]



     mL/min/1.7        some populations, particularly the elderly, for 
whom the CKD-EPI formula has not been extensively validated. Use of the eGFR is 
not recommended in the following populations:  [address]



             Individuals with unstable creatinine concentrations, including 
pregnant patients and those with serious co-morbid conditions.  



               



             Patients with extremes in muscle mass or diet.  



               



             The data above are obtained from the National Kidney Disease 
Education Program (NKDEP) which additionally recommends that when the eGFR is 
used in patients with extremes of body mass index for purposes  



             of drug dosing, the eGFR should be multiplied by the estimated 
BMI.  

 

 CHEM PANEL  Alk Phos  76 unit/L  39 - 136  05/      [address]



               



               



               

 

 CHEM PANEL  ALT  35 unit/L  0 - 65  /      [address]



               



               



               

 

 CHEM PANEL  Albumin Lvl  2.8 g/dL  3.5 - 5.0  /      [address]



               



               



               

 

 CHEM PANEL  Total  7.1 g/dL  6.4 - 8.4        [facility]



   Protein      [address]



               



               



               

 

 CHEM PANEL  Calcium Lvl  8.7 mg/dL  8.5 - 10.5        [address]



               



               



               

 

 CHEM PANEL  AST  18 unit/L  0 - 37  /      [address]



               



               



               

 

 CHEM PANEL  Bili Total  0.3 mg/dL  0.2 - 1.3        [address]



               



               



               

 

 CHEM PANEL  Potassium  4.4 meq/L  3.5 - 5.1        [facility]      [address] [place] [address]



               



               



               

 

 CHEM PANEL  Chloride Lvl  109 meq/L  95 - 109        [address]



               



               



               

 

 CHEM PANEL  CO2  23 meq/L  24 - 32  /      [address]



               



               



               

 

 CHEM PANEL  Glucose Lvl  114 mg/dL  70 - 99        [address]



               



               



               

 

 CHEM PANEL  Creatinine  1.01 mg/dL  0.50 -        [facility]    1.40  /[address] [place] [address]



               



               



               

 

 CHEM PANEL  BUN  17 mg/dL  7 - 22        [address]



               



               



               

 

 CHEM PANEL  Sodium Lvl  142 meq/L  135 - 145        [address]



               



               



               

 

 HEMATOLOGY  Basophils  0.6 %  0.0 - 1.0  /      [address]



               



               



               

 

 HEMATOLOGY  Segs-Bands #  4.8 K/CMM  1.5 - 8.1        [address]



               



               



               

 

 HEMATOLOGY  Monocytes #  0.7 K/CMM  0.0 - 0.8  /      [address]



               



               



               

 

 HEMATOLOGY  Lymphocytes  1.9 K/CMM  1.0 - 5.5  /      [address]



               



               



               

 

 HEMATOLOGY  Monocytes  9.4 %  2.0 - 12.0  /      [address]



               



               



               

 

 HEMATOLOGY  Eosinophils  0.2 K/CMM  0.0 - 0.5        [facility]



   #      /      [facility]



               



               



               

 

 HEMATOLOGY  Eosinophils  2.9 %  0.0 - 4.0         [place]



         /2018      [facility]



               



               



               

 

 HEMATOLOGY  Segs  62.2 %  45.0 -         [place]



       75.0        [facility]



               



               



               

 

 HEMATOLOGY  Lymphocytes  24.9 %  20.0 -         [place]



       40.0        [facility]



               



               



               

 

 HEMATOLOGY  MCH  27.5 pg  27.0 -         [place]



       31.0        [facility]



               



               



               

 

 HEMATOLOGY  MCV  85.3 fL  80.0 -         [place]



       94.0        [facility]



               



               



               

 

 HEMATOLOGY  Hct  43.9 %  42.0 -         [place]



       54.0        [facility]



               



               



               

 

 HEMATOLOGY  Hgb  14.2 g/dL  14.0 -         [place]



       18.0        [facility]



               



               



               

 

 HEMATOLOGY  WBC  7.7 K/CMM  3.7 - 10.4         [place]



         /2018      [facility]



               



               



               

 

 HEMATOLOGY  RBC  5.15 M/CMM  4.70 -         [place]



       6.10        [facility]



               



               



               

 

 HEMATOLOGY  MPV  8.4 fL  7.4 - 10.4         [place]



         /2018      [facility]



               



               



               

 

 HEMATOLOGY  MCHC  32.3 g/dL  32.0 -         [place]



       36.0        [facility]



               



               



               

 

 HEMATOLOGY  RDW  17.3 %  11.5 -         [place]



       14.5        [facility]



               



               



               

 

 HEMATOLOGY  Platelet  317 K/CMM  133 - 450         [place]



         [address]



               



               



               

 

 CHEM PANEL  Globulin  4.4 g/dL  2.7 - 4.2         [place]



               [facility]



               



               



               

 

 CHEM PANEL  A/G Ratio  0.6  0.7 - 1.6         [place]



         /2018      [facility]



               



               



               

 

 CHEM PANEL  B/C Ratio  17  6 - 25         [place]



         /2018      [facility]



               



               



               

 

 CHEM PANEL  AGAP  11.3 meq/L  10.0 -         [place]



       20.0        [facility]



               



               



               

 

 CHEM PANEL  eGFR  134        Result Comment: The eGFR is calculated using 
the CKD-EPI formula. In most young, healthy individuals the eGFR will be >90 mL/
min/1.73m2. The eGFR declines with age. An eGFR of 60-89 may be normal in  [facility] 
[place]



     mL/min/1.7        some populations, particularly the elderly, for 
whom the CKD-EPI formula has not been extensively validated. Use of the eGFR is 
not recommended in the following populations:  [address]



             Individuals with unstable creatinine concentrations, including 
pregnant patients and those with serious co-morbid conditions.  



               



             Patients with extremes in muscle mass or diet.  



               



             The data above are obtained from the National Kidney Disease 
Education Program (NKDEP) which additionally recommends that when the eGFR is 
used in patients with extremes of body mass index for purposes  



             of drug dosing, the eGFR should be multiplied by the estimated 
BMI.  

 

 CHEM PANEL  Creatinine  0.84 mg/dL  0.50 -        [facility]



   Lvl    1.40        [facility]



               



               



               

 

 CHEM PANEL  Sodium Lvl  142 meq/L  135 - 145        [address]



               



               



               

 

 CHEM PANEL  Glucose Lvl  99 mg/dL  70 - 99        [address]



               



               



               

 

 CHEM PANEL  BUN  14 mg/dL  7 - 22        [address]



               



               



               

 

 CHEM PANEL  Alk Phos  79 unit/L  39 - 136        [address]



               



               



               

 

 CHEM PANEL  Bili Total  0.3 mg/dL  0.2 - 1.3        [address]



               



               



               

 

 CHEM PANEL  AST  14 unit/L  0 - 37        [address]



               



               



               

 

 CHEM PANEL  ALT  43 unit/L  0 - 65        [address]



               



               



               

 

 CHEM PANEL  Total  7.1 g/dL  6.4 - 8.4        [facility]



   Protein      [address]



               



               



               

 

 CHEM PANEL  Albumin Lvl  2.7 g/dL  3.5 - 5.0        [address]



               



               



               

 

 CHEM PANEL  Calcium Lvl  9.2 mg/dL  8.5 - 10.5        [address]



               



               



               

 

 CHEM PANEL  CO2  21 meq/L  24 - 32        [address]



               



               



               

 

 CHEM PANEL  Potassium  4.3 meq/L  3.5 - 5.1        [facility]



   Lvl      [address] [place] [address]



               



               



               

 

 CHEM PANEL  Chloride Lvl  114 meq/L  95 - 109        [address]



               



               



               

 

 HEMATOLOGY  MCHC  32.5 g/dL  32.0 -        [facility]



       36.0        [facility]



               



               



               

 

 HEMATOLOGY  RDW  17.5 %  11.5 -        [facility]



       14.5        [facility]



               



               



               

 

 HEMATOLOGY  Platelet  400 K/CMM  133 - 450        [address]



               



               



               

 

 HEMATOLOGY  MPV  8.5 fL  7.4 - 10.4        [facility] [place]



         /[address] [place] [address]



               



               



               

 

 HEMATOLOGY  WBC  6.6 K/CMM  3.7 - 10.4         [place]



         /[address] [place] [address]



               



               



               

 

 HEMATOLOGY  RBC  5.17 M/CMM  4.70 -        [facility]



       6.10        [facility]



               



               



               

 

 HEMATOLOGY  MCV  86.1 fL  80.0 -        [facility]



       94.0        [facility]



               



               



               

 

 HEMATOLOGY  Hct  44.5 %  42.0 -        [facility]



       54.0        [facility]



               



               



               

 

 HEMATOLOGY  MCH  28.0 pg  27.0 -        [facility]



       31.0        [facility]



               



               



               

 

 HEMATOLOGY  Hgb  14.5 g/dL  14.0 -        [facility]



       18.0        [facility]



               



               



               

 

 HEMATOLOGY  Lymphocytes  1.7 K/CMM  1.0 - 5.5        [facility]            [facility]



               



               



               

 

 HEMATOLOGY  Monocytes #  0.7 K/CMM  0.0 - 0.8        [address]



               



               



               

 

 HEMATOLOGY  Eosinophils  0.2 K/CMM  0.0 - 0.5        [facility]            [facility]



               



               



               

 

 HEMATOLOGY  Lymphocytes  26.1 %  20.0 -        [facility]



       40.0        [facility]



               



               



               

 

 HEMATOLOGY  Segs  59.3 %  45.0 -        [facility]



       75.0        [facility]



               



               



               

 

 HEMATOLOGY  Basophils  0.8 %  0.0 - 1.0        [address]



               



               



               

 

 HEMATOLOGY  Monocytes  10.5 %  2.0 - 12.0        [address]



               



               



               

 

 HEMATOLOGY  Eosinophils  3.3 %  0.0 - 4.0        [address]



               



               



               

 

 HEMATOLOGY  Segs-Bands #  3.9 K/CMM  1.5 - 8.1        [address]



               



               



               

 

 TOXICOLOGY  Vanco Tr TND  15:30pm          [address]



               



               



               

 

 TOXICOLOGY  Vanco Tr  9.1 ug/ml          [address]



               



               



               

 

 CHEM PANEL  Glucose Lvl  74 mg/dL  70 - 99        [address]



               



               



               

 

 CHEM PANEL  BUN  12 mg/dL  7 - 22        [address]



               



               



               

 

 CHEM PANEL  Creatinine  0.75 mg/dL  0.50 -        [facility]



   Lvl    1.40        [facility]



               



               



               

 

 CHEM PANEL  eGFR  140        Result Comment: The eGFR is calculated using 
the CKD-EPI formula. In most young, healthy individuals the eGFR will be >90 mL/
min/1.73m2. The eGFR declines with age. An eGFR of 60-89 may be normal in  [facility] 
[place]



     mL/min/1.    some populations, particularly the elderly, for 
whom the CKD-EPI formula has not been extensively validated. Use of the eGFR is 
not recommended in the following populations:  [address]



             Individuals with unstable creatinine concentrations, including 
pregnant patients and those with serious co-morbid conditions.  



               



             Patients with extremes in muscle mass or diet.  



               



             The data above are obtained from the National Kidney Disease 
Education Program (NKDEP) which additionally recommends that when the eGFR is 
used in patients with extremes of body mass index for purposes  



             of drug dosing, the eGFR should be multiplied by the estimated 
BMI.  

 

 CHEM PANEL  Albumin Lvl  2.9 g/dL  3.5 - 5.0        [address]



               



               



               

 

 CHEM PANEL  Globulin  4.4 g/dL  2.7 - 4.2        [address]



               



               



               

 

 CHEM PANEL  A/G Ratio  0.7  0.7 - 1.6        [address]



               



               



               

 

 CHEM PANEL  Bili Total  0.4 mg/dL  0.2 - 1.3        [address]



               



               



               

 

 CHEM PANEL  Alk Phos  71 unit/L  39 - 136        [address]



               



               



               

 

 CHEM PANEL  AST  15 unit/L  0 - 37        [address]



               



               



               

 

 CHEM PANEL  ALT  28 unit/L  0 - 65        [address]



               



               



               

 

 CHEM PANEL  Potassium  4.4 meq/L  3.5 - 5.1        [facility]      [address] [place] [address]



               



               



               

 

 CHEM PANEL  Sodium Lvl  147 meq/L  135 - 145        [address]



               



               



               

 

 CHEM PANEL  CO2  25 meq/L  24 - 32        [address]



               



               



               

 

 CHEM PANEL  Chloride Lvl  114 meq/L  95 - 109  /      [address]



               



               



               

 

 CHEM PANEL  B/C Ratio  16  6 - 25        [address]



               



               



               

 

 CHEM PANEL  Calcium Lvl  8.7 mg/dL  8.5 - 10.5        [address]



               



               



               

 

 CHEM PANEL  AGAP  12.4 meq/L  10.0 -  05/      [facility]



       20.0        [facility]



               



               



               

 

 CHEM PANEL  Total  7.3 g/dL  6.4 - 8.4        [facility] [place]



   Protein      [address]



               



               



               

 

 HEMATOLOGY  Platelet  345 K/CMM  133 - 450  05       [place]



         /2018      [facility]



               



               



               

 

 HEMATOLOGY  MPV  8.7 fL  7.4 - 10.4         [place]



         /2018      [facility]



               



               



               

 

 HEMATOLOGY  WBC  6.9 K/CMM  3.7 - 10.4         [place]



         /2018      [facility]



               



               



               

 

 HEMATOLOGY  RBC  5.30 M/CMM  4.70 -         [place]



       6.10        [facility]



               



               



               

 

 HEMATOLOGY  MCHC  32.8 g/dL  32.0 -         [place]



       36.0        [facility]



               



               



               

 

 HEMATOLOGY  MCH  27.9 pg  27.0 -         [place]



       31.0        [facility]



               



               



               

 

 HEMATOLOGY  Hgb  14.8 g/dL  14.0 -         [place]



       18.0        [facility]



               



               



               

 

 HEMATOLOGY  MCV  85.0 fL  80.0 -        [facility]



       94.0        [facility]



               



               



               

 

 HEMATOLOGY  Hct  45.1 %  42.0 -         [place]



       54.0        [facility]



               



               



               

 

 HEMATOLOGY  RDW  17.5 %  11.5 -         [place]



       14.5        [facility]



               



               



               

 

 HEMATOLOGY  Eosinophils  0.2 K/CMM  0.0 - 0.5        [facility]



   #            [facility]



               



               



               

 

 HEMATOLOGY  Lymphocytes  2.0 K/CMM  1.0 - 5.5        [facility]



         [facility]



               



               



               

 

 HEMATOLOGY  Monocytes #  0.7 K/CMM  0.0 - 0.8  05       [place]



         /2018      [facility]



               



               



               

 

 HEMATOLOGY  Eosinophils  2.4 %  0.0 - 4.0         [place]



         /2018      [facility]



               



               



               

 

 HEMATOLOGY  Basophils  0.6 %  0.0 - 1.0         [place]



         /[address] [place] [address]



               



               



               

 

 HEMATOLOGY  Segs-Bands #  4.0 K/CMM  1.5 - 8.1         [place]



         /2018      [facility]



               



               



               

 

 HEMATOLOGY  Monocytes  10.4 %  2.0 - 12.0         [place]



         /2018      [facility]



               



               



               

 

 HEMATOLOGY  RBC Morph  Normal           [place]



         /2018      [facility]



     (18 2:07 AM)          Post



               



               



               

 

 HEMATOLOGY  Segs  58.1 %  45.0 -         [place]



       75.0        [facility]



               



               



               

 

 HEMATOLOGY  Plt Morph  Normal           [place]



         /2018      [facility]



     (18 2:07 AM)          Post



               



               



               

 

 HEMATOLOGY  Lymphocytes  28.5 %  20.0 -         [place]



       40.0        [facility]



               



               



               

 

 HEMATOLOGY  Basophils #  0.1 K/CMM  0.0 - 0.2        [facility]



               [facility]



               



               



               

 

 HEMATOLOGY  Polychrom  Moderate  None Seen        [facility]



               Medical



     *ABN*          Post



               



     (18 11:07 AM)          



               

 

 TOXICOLOGY  Vanco Tr TND  *          [address]



               



               



               

 

 TOXICOLOGY  Vanco Tr  22.3 ug/ml          [address]



               



               



               

 

 CHEM PANEL  Magnesium  2.3 mg/dL  1.8 - 2.4        [facility]            [facility]



               



               



               

 

 CHEM PANEL  Phosphorus  3.5 mg/dL  2.5 - 4.5        [address]



               



               



               

 

 HEMATOLOGY  PT  14.0 s  12.0 -        [facility]



       14.      [facility]



               



               



               

 

 HEMATOLOGY  PTT  37.6 s  22.9 -        [facility]



       35.      [facility]



               



               



               

 

 HEMATOLOGY  INR  1.08  0.85 -        [facility]



       1.      [facility]



               



               



               

 

 IMMUNOLOGY  C-REACTIVE  13.1 mg/L  <=2.9 mg/L        [facility]



   PROTEIN            [facility]



               



               



               

 

 IMMUNOLOGY  Prealbumin  25.2 mg/dL  18.0 -        [facility]



       45.0        [facility]



               



               



               

 

 CHEM PANEL  Lactic Acid  0.9 mMol/L  0.5 - 2.2        [facility]            [facility]



               



               



               

 

 HEMATOLOGY  Sed Rate  5 mm/h  0 - 15        [address]



               



               



               

 

 IMMUNOLOGY  C-REACTIVE  15.8 mg/L  <=2.9 mg/L        [facility]



   PROTEIN      [address]



               



               



               

 

 HEMATOLOGY  PT  13.0 s  12.0 -        [facility]



       14.      [facility]



               



               



               

 

 HEMATOLOGY  INR  0.98  0.85 -        [facility]



       1.      [facility]



               



               



               

 

 HEMATOLOGY  PTT  33.2 s  22.9 -        [facility]



       35.      [facility]



               



               



               

 

 BLOOD BANK  Antibody  Negative          [facility]



 RESULTS  Scrn            [facility]



     (5/3/18 6:51 PM)          Post



               



               



               

 

 BLOOD BANK  ABO/Rh  AB POS          [facility]



 RESULTS        [address] [place] [address]



               



               



               

 

 URINE AND  UA  0.2 EU/dL  0.1 - 1.0        [facility]



 STOOL  Urobilinogen      /[address] [place] [address]



               



               



               

 

 URINE AND  UA Nitrite  Negative  Negative        [facility]



 STOOL              [facility]



     (5/3/18 6:35 PM)          Post



               



               



               

 

 URINE AND  UA Glucose  Negative  Negative        [facility]              [facility]



     (5/3/18 6:35 PM)          Post



               



               



               

 

 URINE AND  UA Ketones  Negative  Negative        [place] [address]      Medical



     *NA*          Center



               



     (5/3/18 6:35 PM)          



               

 

 URINE AND  UA Bili  Negative  Negative        [facility]        2018      Medical



     *NA*          Post



               



     (5/3/18 6:35 PM)          



               

 

 URINE AND  UA Blood  Trace  Negative        [facility]              Medical



     *ABN*          Post



               



     (5/3/18 6:35 PM)          



               

 

 URINE AND  UA Leuk Est  Small  Negative        [facility]              Medical



     *ABN*          Post



               



     (5/3/18 6:35 PM)          



               

 

 URINE AND  UA Spec Grav  1.020  <=1.030        [address]



               



               



               

 

 URINE AND  UA pH  6.0  5.0 - 8.0        [address]



               



               



               

 

 URINE AND  UA Color  Yellow  Yellow        [facility]        2018      Medical



     *NA*          Post



               



     (5/3/18 6:35 PM)          



               

 

 URINE AND  UA Protein  Trace  Negative        [facility]              Medical



     *ABN*          Post



               



     (5/3/18 6:35 PM)          



               

 

 URINE AND  UA Turbidity  Slight Cloudy  Clear        [address]



     (5/3/18 6:35 PM)          Post



               



               



               

 

 URINE AND  UA Hyal Cast  0-2  0 - 2        [address]



     (5/3/18 6:35 PM)          Post



               



               



               

 

 URINE AND  UA Bacteria  Occasional  None Seen        [facility]    /HPF  /HPF  /      [facility]



               



               



               

 

 URINE AND  UA RBC  11-20 /HPF  0 - 2        [address]



               



               



               

 

 URINE AND  UA Sq Epi  Occasional  Few /LPF        [facility]    /LPF    /[address] [place] [address]



               



               



               

 

 URINE AND  UA WBC    None Seen        [facility]    /HPF  /HPF  /[address] [place] [address]



               



               



               

 

 HEMATOLOGY  Basophils #  0.1 K/CMM  0.0 - 0.2        [address]



               



               



               

 

 HEMATOLOGY  Polychrom  Slight          [address]



               



               



               

 

 HEMATOLOGY  Plt Morph  Normal          [address]



     (5/3/18 6:20 PM)          Post



               



               



               

 

 Brain  Brain shunt  EXAM: SKULL 2 VIEWS        -  [facility]



 shunt  series DX      /    -  Medical



 series DX    EXAM: CHEST 2 VIEWS        This report was dictated by a 
Radiology Resident/Fellow.  I have personally reviewed the images as  Center



             well as the Resident's interpretation and agree with the findings.
  



     EXAM: ABDOMEN 2 VIEWS        Read by:  Bernardo Lorenz MD        
  Resident:  Bernardo Lorenz MD  



             Dictated Date/time:  18 20:33  



             Electronically Signed by:  Martin Phillips MD                      
   18 22:11  



             FINAL REPORT  



     DATE: 5/3/2018 7:20 PM CDT          



               



               



               



     INDICATION: Headache. - Please include [place] view for shunt setting.     
     



               



               



               



     COMPARISON: Brain shuntogram 2013          



               



               



               



     TECHNIQUE: AP and lateral views of the skull, chest and abdomen.          



               



               



               



     FINDINGS:          



               



     There is a single intact shunt tube with the proximal aspect in the right 
frontal calvarium coursing across midline to the left anterior chest and 
abdomen with the tip coiled within the pelvis.          



               



               



               



     A right parietal shunt with distal tip in the right lateral abdomen is 
discontinuous. Another shunt present with proximal tip in the right neck base 
and distal tip in the medial mid abdomen          



               



     Cholecystectomy clips are noted. The lungs are clear but underinflated. 
Appearance of the pelvis is unchanged with bilateral shallow acetabular roofs. 
No obvious posterior dislocation. Spinal dysraphism          



      is seen with absence of the spinous process from L3 to S1.          



               



               



               



     IMPRESSION:          



               



               



               



     1. No significant change. The right transfrontal shunt catheter is intact 
along its course with the distal tip in the pelvis.          



               



     2. Discontinuous right parieto-occipital catheter and 2 discontinuous 
catheters in the right chest and abdomen are unchanged.          



               



     3. Spinal dysraphism.          



               



               



               



               

 

 Brain wo  Brain wo  EXAM: CT BRAIN WITHOUT CONTRAST        -  [facility]



 contrast  contrast CT      /    -  Medical



 CT            This report was dictated by a Radiology Resident/Fellow.  I have 
personally reviewed the images as  Center



             well as the Resident's interpretation and agree with the findings.
  



     DATE: 5/3/2018 627 PM CDT        Read by:  Bernardo Lorenz MD    
      Resident:  Bernardo Lorenz MD  



             Dictated Date/time:  18 18:45  



             Electronically Signed by:  Martin Phillips MD                      
   18 21:12  



             FINAL REPORT  



     INDICATION: 32-year-old male patient with history of  shunt malfunction 
         



               



               



               



     COMPARISON: CT of the brain 2015, 2013.          



               



               



               



     TECHNIQUE: Axial CT images of the brain were obtained. Sagittal and 
coronal reformats.          



               



     IV contrast: None.          



               



               



               



     FINDINGS:          



               



     An orphaned right occipital approach  shunt is present. Also present is 
a right frontal approach  shunt with tip in the left lateral ventricle.      
    



               



     Narrowing of the lateral ventricles is present, unchanged from 2015. 
         



               



     There is mild uncal and cerebellar tonsillar herniation, also unchanged.  
        



               



     No recent hemorrhage or territorial infarct. No mass. No midline shift.   
       



               



     No scalp fluid collections.          



               



     Sinuses are clear.          



               



               



               



     IMPRESSION:          



               



     No acute intracranial abnormality.          



               



     Adequately decompressed ventricular system.          



               



               



               



               

 

 Chest  Chest 1view  EXAM: XR CHEST 1 VIEW        -  [facility]



 1view DX  DX      /    -  Medical



             This report was dictated by a Radiology Resident/Fellow.  I have 
personally reviewed the images as  Center



             well as the Resident's interpretation and agree with the findings.
  



     DATE: 5/3/2018 6:12 PM CDT        Read by:  Olvin Palacio MD          
       Resident:  Olvin Palacio MD  



             Dictated Date/time:  18 18:31  



             Electronically Signed by:  Bakari Interiano MD              
   18 19:29  



             FINAL REPORT  



     INDICATION:  - picc line use          



               



               



               



     UT SECTION: ER          



               



               



               



     COMPARISON: None.          



               



               



               



     TECHNIQUE: AP chest          



               



               



               



     FINDINGS:          



               



     Lines, tubes and hardware:          



               



     Left PICC tip at mid SVC.          



               



               



               



     Lungs and pleura: No pulmonary or pleural based abnormality is identified. 
Pulmonary vascularity is normal.          



               



               



               



     Heart and mediastinum: The heart size is normal for technique. The 
mediastinal contours are normal.          



               



               



               



     Bones: No acute bony abnormality is identified.          



               



               



               



     Upper abdomen: Normal.          



               



               



               



               



               



     IMPRESSION:          



               



     Left PICC tip at mid SVC.          



               



               



               



     No acute cardiopulmonary abnormality is observed.          



               



               



               



               

 

 Laboratory  Sodium Level  142 mEq/L  135 - 145        [facility]



 Studies        /      [facility]



               [place]



               



               



               

 

 Laboratory  Potassium  4.4 mEq/L  3.6 - 5.0        [facility]



 Studies  Level      /      [facility]



               [place]



               



               



               

 

 Laboratory  Magnesium  1.8 mg/dL  1.8 - 2.5        [facility]



 Studies  Level      /      [facility]



               [place]



               



               



               

 

 Laboratory  Glucose  126 mg/dL  65 - 120        [facility]



 Studies  Level      /      [facility]



               [place]



               



               



               

 

 Laboratory  Estimat  null  90        [facility]



 Studies  Glomerular      /      [facility]



   Filtration            [place]



   Rate            



               



               

 

 Laboratory  Creatinine  0.83 mg/dL  0.61 -        [facility]



 Studies      1.24        [facility]



               [place]



               



               



               

 

 Laboratory  Chloride  108 mEq/L  101 - 111        [facility]



 Studies  Level      /      [facility]



               [place]



               



               



               

 

 Laboratory  Carbon  27 mEq/L  21 - 31        [facility]



 Studies  Dioxide      /      [facility]



   Level            [place]



               



               



               

 

 Laboratory  Calcium  9.1 mg/dL  8.5 - 10.5        [facility]



 Studies  Level      /      [facility]



               [place]



               



               



               

 

 Laboratory  Blood Urea  15 mg/dL  6 - 20        [facility]



 Studies  Nitrogen      /      [facility]



               [place]



               



               



               

 

 Laboratory  White Blood  7.0 K/uL  4.3 - 10.9        [facility]



 Studies  Count      /      [facility]



               [place]



               



               



               

 

 Laboratory  Red Cell  17.4 %  12.1 -        [facility]



 Studies  Distribution    15.2        [facility]



   Width            [place]



               



               



               

 

 Laboratory  Red Blood  5.14 M/uL  4.33 -        [facility]



 Studies  Count    5.43        [facility]



               [place]



               



               



               

 

 Laboratory  Platelet  270 K/uL  152 - 406        [facility]



 Studies  Count      /      [facility]



               [place]



               



               



               

 

 Laboratory  Neutrophils  62.3 %  41.7 -        [facility]



 Studies  %    73.7  /      [facility]



               [place]



               



               



               

 

 Laboratory  Monocytes %  9.3 %  3.3 - 12.3        [facility]



 Studies        /      [facility]



               [place]



               



               



               

 

 Laboratory  Mean  8.3 fL  7.6 - 11.3        [facility]



 Studies  Platelet      /      [facility]



   Volume            [place]



               



               



               

 

 Laboratory  Mean  82.8 fL  80 - 100        [facility]



 Studies  Corpuscular      /      [facility]



   Volume            [place]



               



               



               

 

 Laboratory  Mean  33.4 g/dL  32.0 -        [facility].



 Studies  Corpuscular    36.0        [facility]



   Hemoglobin            [place]



   Concent            



               



               

 

 Laboratory  Mean  27.6 pg  27.0 -        [facility]



 Studies  Corpuscular    35.0  /      [facility]



   Hemoglobin            [place]



               



               



               

 

 Laboratory  Lymphocytes  24.6 %  15.3 -        [facility]



 Studies  %    44.8  /      [facility]



               [place]



               



               



               

 

 Laboratory  Hemoglobin  14.2 g/dL  13.6 -        [facility]



 Studies      17.9  /      [facility]



               [place]



               



               



               

 

 Laboratory  Hematocrit  42.6 %  39.6 -        [facility]



 Studies      49.0  /      [facility]



               [place]



               



               



               

 

 Laboratory  Eosinophils  3.3 %  0 - 4.4        [facility]



 Studies  %      /      [facility]



               [place]



               



               



               

 

 Laboratory  Basophils %  0.5 %  0 - 1.3        [facility]



 Studies        /      [facility]



               [place]



               



               



               

 

 Laboratory  Absolute  4.4 K/uL  1.8 - 8.0        [facility]



 Studies  Neutrophil      /      [facility]



               [place]



               



               



               

 

 Laboratory  Absolute  0.7 K/uL  0.1 - 1.3        [facility]



 Studies  Monocytes      /      [facility]



   (CBC)            [place]



               



               



               

 

 Laboratory  Absolute  1.7 K/uL  0.7 - 4.9        [facility]



 Studies  Lymphocytes      /      [facility]



   (CBC)            [place]



               



               



               

 

 Laboratory  Absolute  0.2 K/uL  0 - 0.5        [facility]



 Studies  Eosinophils            [facility]



   (CBC)            [place]



               



               



               

 

 Laboratory  Absolute  0.0 K/uL  0 - 0.5        [facility]



 Studies  Basophils            [facility]



   (CBC)            [place]



               



               



               

 

 Microbiolo  Providencia  Providenci          [facility]



 gy Studies  Rettgeri  a Rettgeri    /      [facility]



               [place]



               



               



               

 

 Laboratory  Urine WBC  null          [facility]



 Studies        /      [facility]



               [place]



               



               



               

 

 Laboratory  Urine  null          [facility]



 Studies  Squamous      /      [facility]



   Epithelial            [place]



   Cells            



               



               

 

 Laboratory  Urine RBC  Urine RBC          [facility]



 Studies        /      [facility]



               [place]



               



               



               

 

 Laboratory  Urine  Urine          [facility]



 Studies  Culture  Culture    /      [facility]



   Reflexed  Reflexed          [place]



               



               



               

 

 Laboratory  Urine  null          [facility]



 Studies  Bacteria      /      [facility]



               [place]



               



               



               

 

 Laboratory  Urine pH  6.5          [facility]



 Studies        /2018      [facility]



               [place]



               



               



               

 

 Laboratory  Urine  2.0 mg/dL          [facility]



 Studies  Urobilinogen      /      [facility]



               [place]



               



               



               

 

 Laboratory  Urine Total  Urine          [facility]



 Studies  Protein  Total    /      [facility]



     Protein          [place]



               



               



               

 

 Laboratory  Urine  1.020          [facility]



 Studies  Specific      /      [facility]



   Friendship            [place]



               



               



               

 

 Laboratory  Urine  Urine          [facility]



 Studies  Nitrite  Nitrite    /      [facility]



               [place]



               



               



               

 

 Laboratory  Urine  Urine          [facility]



 Studies  Leukocyte  Leukocyte    /      [facility]



   Esterase  Esterase          [place]



               



               



               

 

 Laboratory  Urine  Urine          [facility]



 Studies  Ketones  Ketones          [facility]



               [place]



               



               



               

 

 Laboratory  Urine  Urine          [facility]



 Studies  Glucose  Glucose          [facility]



               [place]



               



               



               

 

 Laboratory  Urine Color  Urine          [facility]



 Studies    Color    /      [facility]



               [place]



               



               



               

 

 Laboratory  Urine Blood  Urine          [facility]



 Studies    Blood          [facility]



               [place]



               



               



               

 

 Laboratory  Urine  Urine          [facility]



 Studies  Bilirubin  Bilirubin          [facility]



               [place]



               



               



               

 

 Laboratory  Urine  Urine          [facility]



 Studies  Appearance  Appearance    /      [facility]



               [place]



               



               



               

 

 Laboratory  Prothrombin  13.0  9.5 - 12.5        [facility]



 Studies  Time  SECONDS          [facility]



               [place]



               



               



               

 

 Laboratory  INR  1.10          [facility]



 Studies  Internationa      /      [facility]



   l Normalized            [place]



   Ratio            



               



               

 

 Laboratory  Total  1.9 mg/dL  0.3 - 1.2        [facility]



 Studies  Bilirubin      /      [facility]



               [place]



               



               



               

 

 Laboratory  Serum Total  7.8 g/dL  6.0 - 8.3        [facility]



 Studies  Protein      /      [facility]



               [place]



               



               



               

 

 Laboratory  Globulin  4.2 g/dL  2.3 - 3.5        [facility]



 Studies        /      [facility]



               [place]



               



               



               

 

 Laboratory  Direct  0.6 mg/dL  0 - 0.2        [facility]



 Studies  Bilirubin      /      [facility]



               [place]



               



               



               

 

 Laboratory  Aspartate  88 IU/L  10 - 42        [facility]



 Studies  Amino Transf      /      [facility]



   (AST/SGOT)            [place]



               



               



               

 

 Laboratory  Alkaline  192 IU/L  42 - 121        [facility].



 Studies  Phosphatase      /      [facility]



               [place]



               



               



               

 

 Laboratory  Albumin/Glob  0.9  1.1 - 1.8        [facility]



 Studies  ulin Ratio      /      [facility]



               [place]



               



               



               

 

 Laboratory  Albumin  3.6 g/dL  3.2 - 5.5        [facility]



 Studies        /      [facility]



               [place]



               



               



               

 

 Laboratory  Alanine  135 IU/L  10 - 60        [facility]



 Studies  Aminotransfe      /      [facility]



   rase            [place]



   (ALT/SGPT)            



               



               

 

 Laboratory  Procalcitoni  0.44 ng/mL          [facility]



 Studies  n            [facility]



               [place]



               



               



               

 

 Laboratory  B-Type  20 pg/ml          [facility]



 Studies  Natriuretic      /      [facility]



   Peptide            [place]



               



               



               

 

 Laboratory  Lipase  22 U/L  22 - 51        [facility]



 Studies              [facility]



               [place]



               



               



               

 

 Laboratory  Rapid  null          [facility]



 Studies  Troponin I            [facility]



               [place]



               



               



               

 

 Laboratory  Lactic Acid  8.6 mg/dL  4.5 - 19.8        [facility]



 Studies  Level            [facility]



               [place]



               



               



               

 

 Microbiolo  Morganella  Morganella          [facility]



 gy Studies  Morganii  Morganii          [facility]



               [place]



               



               



               

 

 Microbiolo  Proteus  Proteus          [facility]



 gy Studies  Mirabilis  Mirabilis          [facility]



               [place]



               



               



               

 

 Laboratory  Activated  32.2  24.3 -        [facility]



 Studies  Partial  SECONDS  36.9        [facility]



   Thromboplast            [place]



   Time            



               



               







Vital Signs







 Vital Sign  Value  Date  Comments  Source



         

 

 Temperature Oral (F)  97.2 F  2018    [facility]



         

 

 Systolic (mm Hg)  127  2018    [facility]



         

 

 Diastolic (mm Hg)  85  2018    [facility]



         

 

 Heart Rate  81  2018    [facility]



         

 

 Respitory Rate  18  2018    [facility]



         

 

 Heart Rate  90  2018    [facility]



         

 

 Systolic (mm Hg)  106  2018    [facility]



         

 

 Diastolic (mm Hg)  71  2018    [facility]



         

 

 Respitory Rate  18  2018    [facility]



         

 

 Temperature Oral (F)  98.1 F  2018    [facility]



         

 

 Respitory Rate  18  2018    [facility]



         

 

 Systolic (mm Hg)  168  2018    [facility]



         

 

 Diastolic (mm Hg)  107  2018    [facility]



         

 

 Heart Rate  87  2018    [facility]



         

 

 Temperature Oral (F)  98.1 F  2018    [facility]



         

 

 Weight  127.027  2018    [facility]



         

 

 Weight  127.027  2018    [facility]



         

 

 Weight  127.027  2018    [facility]



         

 

 BMI Calculated  48.16  2018    [facility]



         

 

 Height  162.56 cm  2018    [facility]



         

 

 Height  149.86 cm  2018    [facility]



         

 

 BMI Calculated  56.67  2018    [facility]



         

 

 Heart Rate  85  2018    [facility]



         [place]



         

 

 Systolic (mm Hg)  145  2018    [facility]



         [place]



         

 

 Diastolic (mm Hg)  66  2018    [facility]



         [place]



         

 

 Temperature Oral (F)  97.7 F  2018    [facility]



         [place]



         

 

 Respitory Rate  16  2018    [facility]



         [place]



         

 

 Height  59  2018    [facility]



         [place]



         

 

 Weight  280  2018    [facility]



         [place]



         







Encounters







 Location  Location  Encounter  Encounter  Reason  Attending  ADM  DC  Status  
Source



   Details  Type  Number  For  Provider  Date  Date    



         Visit          



                   



                   



                   

 

 [facility]    Discharged  [id number]          [facility]    Rec[facility]        /2017    [facility]



 [place]                  [place]



                   [facility]



                   



                   

 

 [facility]    Discharged  [id number]          [facility]



 [facility]    Recurring        /2018    [facility]



 [place]                  [place]



                   [place]



                   



                   

 

 [facility]    Departed  [id number]          [facility]



 [facility]    Emergency        /2018    [facility]



 [place]                  [place]



                   [place]



                   



                   

 

 [facility]    Discharged  [id number]          [facility]



 [facility]    Recurring        /2018    [facility]



 [place]                  [place]



                   [place]



                   



                   

 

 [facility]    Registered  [id number]      03/15      [facility]



 [facility]    Recurring        /      [facility]



 [place]                  [place]



                   [place]



                   



                   

 

 [facility]    Discharged  [id number]          [facility]



 [facility]    Inpatient        /2018    [facility]



 [place]                  [place]



                   [place]



                   



                   

 

 [facility]    Inpatient  [id number]    [place]      [facility]  /2018    [facility]



                   



                   



                   







Procedures







 Procedure  Code  Date  Perfomer  Comments  Source



           



           

 

 Chest Single View  [id number]        [facility]



     8      [place]



           



           

 

 Gram Stain  [id number]        [facility]



     8      [place]



           



           

 

 Culture & Sensitivity  [id number]        [facility] -



     8      [place]



           



           

 

 Anaerobic Blood  [id number]        [facility]



 Culture    8      [place]



           



           

 

 Aerobic Blood Culture  [id number]        [facility]



     8      [place]



           



           

 

 Chest Single View  [id number]        [facility]



     [facility]      [place]



           



           

 

 Gram Stain  [id number]        [facility] -



     8      [place]



           



           

 

 Culture & Sensitivity  [id number]        [facility]



     [facility]      [place]



           



           

 

 Chest Single View  [id number]        [facility] -



     8      [place]



           



           

 

 Cholecystectomy  [id number]        [facility]



           



           

 

 Shunt of cerebral  [id number]        [facility]



 ventricle to          [facility]



 extracranial site

## 2019-01-23 NOTE — XMS REPORT
[facility]

 Created on:2018



Patient:Redd Dale

Sex:Male

:1985

External Reference #:[id number]





Demographics







 Address  [address] [place] [address]

 

 Phone  [phone]

 

 Preferred Language  en

 

 Marital Status  Unknown

 

 Mandaeism Affiliation  Unknown

 

 Race  Black or 

 

 Ethnic Group  Not  or 









Author







 Organization  [employer]









Care Team Providers







 Name  Role  Phone

 

 Knox, Na  Provider Role  Unavailable









Allergies

No Known Allergies



Problems







 Problem Type  Condition  Code  Onset Dates  Condition Status

 

 Problem  Nicotine dependence  F17.200    Active

 

 Problem  Lumbar spina bifida with  Q05.2    Active



   hydrocephalus      

 

 Problem  Dependence on wheelchair  Z99.3    Active

 

 Problem  Fecal incontinence  R15.9    Active

 

 Problem  Foot ulcer  L97.509    Active

 

 Problem  Depression with anxiety  F41.8    Active

 

 Problem  Paraplegia  G82.20    Active

 

 Problem  Constipation  K59.00    Active

 

 Problem  Incontinence of urine  R32    Active

 

 Problem  Nausea alone  R11.0    Active

 

 Problem  Episodic tension-type headache, not  G44.219    Active



   intractable      

 

 Problem  Nonintractable episodic headache,  R51    Active



   unspecified headache type      

 

 Problem   (ventriculoperitoneal) shunt  Z98.2    Active



   status      







Medications

No Known Medications



Results

No Known Results



Summary Purpose

[employer] Submission

## 2019-01-23 NOTE — RAD REPORT
EXAM DESCRIPTION:  RAD - Chest Single View - 1/23/2019 3:29 pm

 

CLINICAL HISTORY:  Acute onset lower chest pain, epigastric pain, history of long-standing  shunt

 

COMPARISON:  Chest film October 11, 2018

 

TECHNIQUE:  AP portable chest image was obtained 1514 hour .

 

FINDINGS:  No focal lung parenchymal process. Lung markings are prominent but stable. Heart and vascu
lature are normal. No measurable pleural effusion and no pneumothorax. No acute bony abnormality seen
. No acute aortic findings suspected.  shunt tubing overlies the chest.

 

IMPRESSION:  No acute cardiopulmonary process.

 

No significant change from comparison.

## 2019-01-23 NOTE — XMS REPORT
[facility]

 Created on:2018



Patient:Redd Dale

Sex:Male

:1985

External Reference #:[id number]





Demographics







 Address  [address]  [place] [address]

 

 Phone  [phone]

 

 Preferred Language  en

 

 Marital Status  Unknown

 

 Bahai Affiliation  Unknown

 

 Race  Black or 

 

 Ethnic Group  Not  or 









Author







 Organization  [employer]









Care Team Providers







 Name  Role  Phone

 

 Knox, Na  Provider Role  Unavailable









Allergies, Adverse Reactions, Alerts







 Substance  Reaction  Event Type

 

 Toradol  Info Not Available  Drug Allergy

 

 Sulfa  Info Not Available  Drug Allergy

 

 Zofran  Info Not Available  Drug Allergy

 

 Morphine Sulfate ER  Info Not Available  Drug Allergy

 

 Levaquin  Info Not Available  Drug Allergy







Problems







 Problem Type  Condition  Code  Onset Dates  Condition Status

 

 Problem  Nicotine dependence  F17.200    Active

 

 Problem  Lumbar spina bifida with  Q05.2    Active



   hydrocephalus      

 

 Problem  Dependence on wheelchair  Z99.3    Active

 

 Problem  Fecal incontinence  R15.9    Active

 

 Problem  Foot ulcer  L97.509    Active

 

 Problem  Depression with anxiety  F41.8    Active

 

 Problem  Paraplegia  G82.20    Active

 

 Problem  Constipation  K59.00    Active

 

 Problem  Incontinence of urine  R32    Active

 

 Problem  Nausea alone  R11.0    Active

 

 Assessment  Episodic tension-type headache, not  G44.219    Active



   intractable      

 

 Assessment   (ventriculoperitoneal) shunt  Z98.2    Active



   status      

 

 Assessment  Ulcer of left foot, unspecified  L97.529    Active



   ulcer stage      

 

 Assessment  Nonintractable episodic headache,  R51    Active



   unspecified headache type      

 

 Assessment  Depression with anxiety  F41.8    Active

 

 Problem  Episodic tension-type headache, not  G44.219    Active



   intractable      

 

 Assessment  Lumbar spina bifida with  Q05.2    Active



   hydrocephalus      

 

 Problem  Nonintractable episodic headache,  R51    Active



   unspecified headache type      

 

 Assessment  Nausea and vomiting, intractability  R11.2    Active



   of vomiting not specified,      



   unspecified vomiting type      

 

 Problem   (ventriculoperitoneal) shunt  Z98.2    Active



   status      







Medications







 Medication  Code  Code  Instructions  Start  End  Status  Dosage



   System      Date  Date    

 

 Tylenol with  NDC  50892633854  300-60 MG      Active  1 tablet as



 Codeine #4      Orally every 6        needed



       hrs        

 

 Macrobid  NDC  34609971308  100 MG Orally      Active  1 capsule



       every 12 hrs        with food

 

 Pantoprazole  NDC  19152879077  40 MG Orally      Active  1 tablet



 Sodium      Once a day        

 

 Collagenase  NDC  [address]-09  250 UNIT/GM      Active  1 application



       Externally        to affected



       Once a day        area







Results

No Known Results



Summary Purpose

[employer] Submission

## 2019-01-23 NOTE — XMS REPORT
[facility]

 Created on:September 15, 2018



Patient:Redd Dale

Sex:Male

:1985

External Reference #:[id number]





Demographics







 Address  [address] [place] [address]

 

 Phone  [phone]

 

 Preferred Language  en

 

 Marital Status  Unknown

 

 Amish Affiliation  Unknown

 

 Race  Black or 

 

 Ethnic Group  Unknown









Author







 Organization  [employer]









Care Team Providers







 Name  Role  Phone

 

 Knox, Na  Provider Role  Unavailable









Allergies, Adverse Reactions, Alerts







 Substance  Reaction  Event Type

 

 Zofran  Info Not Available  Drug Allergy

 

 Morphine Sulfate ER  Info Not Available  Drug Allergy

 

 Levaquin  Info Not Available  Drug Allergy







Problems







 Problem Type  Condition  Code  Onset Dates  Condition Status

 

 Assessment  Blood tests for routine general  Z00.00    Active



   physical examination      

 

 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



   disorder      

 

 Problem  Constipation  K59.00    Active

 

 Assessment  Ulcer of left foot, unspecified  L97.529    Active



   ulcer stage      

 

 Problem  Paraplegia  G82.20    Active

 

 Assessment   (ventriculoperitoneal) shunt  Z98.2    Active



   status      

 

 Problem  Nausea alone  R11.0    Active

 

 Problem  Fecal incontinence  R15.9    Active

 

 Problem  Incontinence of urine  R32    Active

 

 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



   disorder      

 

 Problem  Mental disorder, not otherwise  F99    Active



   specified      

 

 Assessment  Depression with anxiety  F41.8    Active

 

 Assessment  Bipolar depression  F31.30    Active

 

 Problem  Bipolar depression  F31.30    Active

 

 Assessment  Lumbar spina bifida with  Q05.2    Active



   hydrocephalus      

 

 Problem  Blood tests for routine general  Z00.00    Active



   physical examination      

 

 Problem  Depression with anxiety  F41.8    Active

 

 Problem  Nausea and vomiting, intractability  R11.2    Active



   of vomiting not specified,      



   unspecified vomiting type      

 

 Problem  Ulcer of left foot, unspecified  L97.529    Active



   ulcer stage      

 

 Problem  Nonintractable episodic headache,  R51    Active



   unspecified headache type      

 

 Problem   (ventriculoperitoneal) shunt  Z98.2    Active



   status      

 

 Problem  Episodic tension-type headache, not  G44.219    Active



   intractable      

 

 Problem  Lumbar spina bifida with  Q05.2    Active



   hydrocephalus      

 

 Problem  Foot ulcer  L97.509    Active

 

 Problem  Nicotine dependence  F17.200    Active

 

 Problem  Dependence on wheelchair  Z99.3    Active







Medications







 Medication  Code  Code  Instructions  Start  End  Status  Dosage



   System      Date  Date    

 

 Macrobid  NDC  92052814155  100 MG Orally      Active  1 capsule



       every 12 hrs        with food

 

 Tylenol with  NDC  30954952665  300-60 MG      Active  1 tablet as



 Codeine #4      Orally every 6        needed



       hrs        

 

 Pantoprazole  NDC  04076667462  40 MG Orally      Active  1 tablet



 Sodium      Once a day        

 

 Citalopram  NDC  49483679855  10 MG Orally      Active  1 tablet



 Hydrobromide      Once a day        

 

 Collagenase  NDC  [address]-21  250 UNIT/GM      Active  1 application



       Externally        to affected



       Once a day        area

 

 Seroquel  NDC  97676550291  25 MG Orally      Active  1 tablet



       Once a day at        



       bedtime        







Results

No Known Results



Summary Purpose

[employer] Submission

## 2019-01-23 NOTE — RAD REPORT
EXAM DESCRIPTION:  CT - Abdomen   Pelvis W Contrast - 1/23/2019 4:10 pm

 

CLINICAL HISTORY:  Abdominal pain, epigastric pain, history of GERD,  shunt

 

COMPARISON:  CT study August 2018 and October 2016

 

TECHNIQUE:  Biphasic, helical CT imaging of the abdomen and pelvis was performed following 100 ml non
-ionic IV contrast. Oral contrast was given.

 

All CT scans are performed using dose optimization technique as appropriate and may include automated
 exposure control or mA/KV adjustment according to patient size.

 

FINDINGS:  No suspicious findings in the lung bases.

 

The liver, spleen, and pancreas show no suspicious findings. Gallbladder is absent. Biliary tree with
in normal limits.

 

Symmetric renal function is seen with no hydronephrosis or suspicious renal mass. No pyelonephritis o
r acute parenchymal process. Slight motion degradation limits renal parenchymal assessment. Concerns 
for pyelonephritis or acute  infectious process can be addressed with UA findings. No adrenal abnor
malities. Urinary bladder is contracted around a suprapubic catheter. Bladder walls cannot be assesse
d with this degree contraction.

 

No dilated bowel loops or bowel wall thickening. Moderate stool volume in the rectum and sigmoid colo
n. No free air, free fluid or inflammatory stranding.  No new mass or bulky lymphadenopathy. Bilatera
l external iliac lymph nodes are stable.

 

No suspicious bony findings.  No ascites.

 

Minimal stranding in the left-side lower gluteal fat not substantially different from prior imaging. 
Scrotal and lower perineal soft tissues incompletely visualized.

 

IMPRESSION:  CT abdomen and pelvis imaging shows no suspicious finding to explain acute epigastric pa
in.

 

Nonacute findings detailed in the body of the report.

## 2019-01-23 NOTE — EKG
Test Date:    2019-01-23               Test Time:    14:23:36

Technician:   FIFI                                     

                                                     

MEASUREMENT RESULTS:                                       

Intervals:                                           

Rate:         95                                     

KY:           144                                    

QRSD:         98                                     

QT:           342                                    

QTc:          429                                    

Axis:                                                

P:            67                                     

KY:           144                                    

QRS:          78                                     

T:            15                                     

                                                     

INTERPRETIVE STATEMENTS:                                       

                                                     

Normal sinus rhythm

Nonspecific T wave abnormality

Abnormal ECG

Compared to ECG 10/11/2018 13:57:05

T-wave abnormality now present

Sinus arrhythmia no longer present



Electronically Signed On 01-23-19 19:45:30 CST by Cheng Anglin

## 2019-01-23 NOTE — XMS REPORT
[facility]

 Created on:2018



Patient:Redd Dale

Sex:Male

:1985

External Reference #:[id number]





Demographics







 Address  [address]  [place] [address]

 

 Phone  [phone]

 

 Preferred Language  en

 

 Marital Status  Unknown

 

 Christianity Affiliation  Unknown

 

 Race  Black or 

 

 Ethnic Group  Unknown









Author







 Organization  [employer]









Care Team Providers







 Name  Role  Phone

 

 Knox, Na  Provider Role  Unavailable









Allergies, Adverse Reactions, Alerts







 Substance  Reaction  Event Type

 

 Zofran  Info Not Available  Drug Allergy

 

 Morphine Sulfate ER  Info Not Available  Drug Allergy

 

 Levaquin  Info Not Available  Drug Allergy







Problems







 Problem Type  Condition  Code  Onset Dates  Condition Status

 

 Assessment  Mental disorder, not otherwise  F99    Active



   specified      

 

 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



   disorder      

 

 Assessment  Ulcer of left foot, unspecified  L97.529    Active



   ulcer stage      

 

 Problem  Foot ulcer  L97.509    Active

 

 Assessment  Nonintractable episodic headache,  R51    Active



   unspecified headache type      

 

 Problem  Constipation  K59.00    Active

 

 Assessment  Episodic tension-type headache, not  G44.219    Active



   intractable      

 

 Problem  Paraplegia  G82.20    Active

 

 Problem  Incontinence of urine  R32    Active

 

 Problem  Nausea alone  R11.0    Active

 

 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



   disorder      

 

 Problem  Mental disorder, not otherwise  F99    Active



   specified      

 

 Assessment  Bipolar depression  F31.30    Active

 

 Assessment  Lumbar spina bifida with  Q05.2    Active



   hydrocephalus      

 

 Problem  Bipolar depression  F31.30    Active

 

 Assessment   (ventriculoperitoneal) shunt  Z98.2    Active



   status      

 

 Problem  Depression with anxiety  F41.8    Active

 

 Problem  Fecal incontinence  R15.9    Active

 

 Problem  Nausea and vomiting, intractability  R11.2    Active



   of vomiting not specified,      



   unspecified vomiting type      

 

 Problem  Ulcer of left foot, unspecified  L97.529    Active



   ulcer stage      

 

 Problem  Episodic tension-type headache, not  G44.219    Active



   intractable      

 

 Problem  Nonintractable episodic headache,  R51    Active



   unspecified headache type      

 

 Assessment  Depression with anxiety  F41.8    Active

 

 Problem  Dependence on wheelchair  Z99.3    Active

 

 Problem  Lumbar spina bifida with  Q05.2    Active



   hydrocephalus      

 

 Problem   (ventriculoperitoneal) shunt  Z98.2    Active



   status      

 

 Problem  Nicotine dependence  F17.200    Active







Medications







 Medication  Code  Code  Instructions  Start  End  Status  Dosage



   System      Date  Date    

 

 Collagenase  NDC  [address]-04  250 UNIT/GM      Active  1 application



       Externally        to affected



       Once a day        area

 

 Macrobid  NDC  80294318803  100 MG Orally      Active  1 capsule



       every 12 hrs        with food

 

 Tylenol with  NDC  24244910820  300-60 MG      Active  1 tablet as



 Codeine #4      Orally every 6        needed



       hrs        

 

 Citalopram  NDC  32092175278  10 MG Orally  July    Active  1 tablet



 Hydrobromide      Once a day  2018      

 

 Pantoprazole  NDC  98716088207  40 MG Orally      Active  1 tablet



 Sodium      Once a day        

 

 Seroquel  NDC  77170086265  25 MG Orally  July    Active  1 tablet



       Once a day at  16,      



       bedtime        







Results

No Known Results



Summary Purpose

[employer] Submission

## 2019-01-23 NOTE — ER
Nurse's Notes                                                                                     

 [facility]                                                                

Name: Redd Dale Jr                                                                            

Age: 33 yrs                                                                                       

Sex: Male                                                                                         

: 1985                                                                                   

MRN: [id number]                                                                                   

Arrival Date: 2019                                                                          

Time: 14:19                                                                                       

Account#: [id number]                                                                            

Bed 5                                                                                             

Private MD:                                                                                       

Diagnosis: Unspecified abdominal pain                                                             

                                                                                                  

Presentation:                                                                                     

                                                                                             

14:23 Presenting complaint: EMS states: pt c/o sudden onset epigastric pain, sharp, rated     jl7 

      10/10. Pt has a  shunt placed 16 years ago. Transition of care: patient was not           

      received from another setting of care. Onset of symptoms was 2019 at 14:00.     

      Risk Assessment: Do you want to hurt yourself or someone else? Patient reports no           

      desire to harm self or others. Initial Sepsis Screen: Does the patient meet any 2           

      criteria? No. Patient's initial sepsis screen is negative. Does the patient have a          

      suspected source of infection? No. Patient's initial sepsis screen is negative. Care        

      prior to arrival: IV initiated. 20 GA, in the right antecubital area.                       

14:23 Method Of Arrival: EMS: [facility]                                                jl7 

14:23 Acuity: AYESHA 3                                                                           jl7 

                                                                                                  

Triage Assessment:                                                                                

14:27 General: Appears in no apparent distress. uncomfortable, Behavior is calm, cooperative, jl7 

      appropriate for age. Pain: Complains of pain in epigastric area Pain does not radiate.      

      Pain currently is 10 out of 10 on a pain scale. Quality of pain is described as sharp,      

      Pain began 30 min ago. Is continuous. EENT: No signs and/or symptoms were reported          

      regarding the EENT system. Neuro: Level of Consciousness is awake, alert, obeys             

      commands, Oriented to person, place, time, situation. Cardiovascular: Patient's skin is     

      warm and dry. Respiratory: Airway is patent Respiratory effort is even, unlabored,          

      Respiratory pattern is regular, symmetrical. GI: Reports nausea, vomiting, Patient          

      currently denies diarrhea. : No signs and/or symptoms were reported regarding the         

      genitourinary system. Ortega in place to gravity drainage. Derm: No signs and/or             

      symptoms reported regarding the dermatologic system. Musculoskeletal: No signs and/or       

      symptoms reported regarding the musculoskeletal system.                                     

                                                                                                  

Historical:                                                                                       

- Allergies:                                                                                      

14:27 Amoxicillin;                                                                            jl7 

14:27 Bactrim;                                                                                jl7 

14:27 Ciprofloxacin;                                                                          jl7 

14:27 CLAVULANIC ACID;                                                                        jl7 

14:27 Doxycycline;                                                                            jl7 

14:27 Levofloxacin;                                                                           jl7 

14:27 Morphine;                                                                               jl7 

14:27 Toradol;                                                                                jl7 

14:27 TRIMETHOPRIM;                                                                           jl7 

14:27 Vancomycin;                                                                             jl7 

14:27 Zofran;                                                                                 jl7 

- PMHx:                                                                                           

14:27 Asthma; Cerebral Palsy; cluster headaches; decubitus ulcers on feet; GERD;              jl7 

      Hydrocephalus; Hypertension; spina bifida;                                                  

- PSHx:                                                                                           

14:27 SHUNT REVISION ; AMPUTATION OF LOWER LIMB ;                                     jl7 

                                                                                                  

- Immunization history:: Adult Immunizations up to date.                                          

- Social history:: Smoking status: unknown.                                                       

- Ebola Screening: : No symptoms or risks identified at this time.                                

                                                                                                  

                                                                                                  

Screenin:32 Abuse screen: Denies threats or abuse. Denies injuries from another. Nutritional        jl7 

      screening: No deficits noted. Tuberculosis screening: No symptoms or risk factors           

      identified. Fall Risk IV access (20 points). Ambulatory Aid- None/Bed Rest/Nurse Assist     

      (0 pts). Total Kim Fall Scale indicates No Risk (0-24 pts).                               

                                                                                                  

Assessment:                                                                                       

14:32 General: See triage assessment.                                                         jl7 

15:30 Reassessment: Patient appears in no apparent distress at this time. Patient and/or      [facility] 

      family updated on plan of care and expected duration. Pain level reassessed. Patient is     

      alert, oriented x 3, equal unlabored respirations, skin warm/dry/pink.                      

16:28 Reassessment: Patient appears in no apparent distress at this time. Patient and/or      jl7 

      family updated on plan of care and expected duration. Pain level reassessed. Patient is     

      alert, oriented x 3, equal unlabored respirations, skin warm/dry/pink.                      

17:17 Reassessment: Report given to YADIEL Sterling at [facility], Hallie reports she will call   georgette 

      back with a time frame for transportation.                                                  

17:58 Reassessment: [facility] reports pt will be picked up for transportation by 1830.    [facility] 

                                                                                                  

Vital Signs:                                                                                      

14:27  / 90; Pulse 97; Resp 16 S; Temp 98(O); Pulse Ox 92% on R/A; Pain 10/10;          jl7 

15:19  / 88; Pulse 84; Resp 17; Pulse Ox 93% on R/A;                                    tw2 

17:17  / 89; Pulse 86; Resp 16 S; Pulse Ox 96% on R/A;                                  jl7 

                                                                                                  

ED Course:                                                                                        

14:19 Patient arrived in ED.                                                                  iw  

14:23 Jama Bonilla, RN is Primary Nurse.                                                      jl7 

14:25 Triage completed.                                                                       jl7 

14:26 Denver Barry NP is PHCP.                                                           pm1 

14:26 Anuel Berry MD is Attending Physician.                                                pm1 

14:27 Arm band placed on right wrist.                                                         jl7 

14:32 Patient has correct armband on for positive identification. Bed in low position. Call   jl7 

      light in reach. Side rails up X 1. Pulse ox on. NIBP on. Warm blanket given.                

14:32 Maintain EMS IV. Dressing intact. Site clean \T\ dry. Gauge \T\ site: 20 right AC.          jl
7

14:39 EKG done, by EKG tech. reviewed by Denver Barry NP.                                  at1 

14:41 Radiology exam delayed due to lab results not completed at this time. (BUN/Creatinine). vm2 

15:00 IV discontinued, intact, bleeding controlled, No redness/swelling at site. Pressure     jl7 

      dressing applied.                                                                           

15:00 Initial lab(s) drawn, by me, sent to lab. Inserted saline lock: 24 gauge in left        jl7 

      forearm, using aseptic technique. Blood collected.                                          

15:34 XRAY Chest (1 view) In Process Unspecified.                                             EDMS

15:38 Radiology exam delayed due to lab results not completed at this time. (BUN/Creatinine). vm2 

15:47 Radiology exam delayed due to lab results not completed at this time. (BUN/Creatinine). vm2 

15:57 Patient moved to CT via stretcher.                                                      vm2 

16:08 CT completed. Patient moved back from CT.                                               vm2 

16:12 CT Abd/Pelvis - W/Contrast: IV contrast only In Process Unspecified.                    EDMS

16:35 Olvin Kitchen MD is Referral Physician.                                              pm1 

18:49 No provider procedures requiring assistance completed. IV discontinued, intact,         jl7 

      bleeding controlled, No redness/swelling at site. Pressure dressing applied.                

                                                                                                  

Administered Medications:                                                                         

15:40 Drug: Demerol 25 mg Route: IVP; Site: left forearm;                                     jl7 

16:35 Follow up: Response: No adverse reaction; Pain is unchanged, physician notified         jl7 

15:43 Drug: Phenergan 12.5 mg Route: IVP; Site: left forearm;                                 jl7 

16:00 Follow up: Response: No adverse reaction; Nausea is decreased                           jl7 

16:40 Drug: GI Cocktail without Donnatal - (Maalox Suspension 30 ml, Lidocaine Liquid 2 % 15  jl7 

      ml) Route: PO;                                                                              

17:10 Follow up: Response: No adverse reaction; Pain is decreased                             jl7 

16:40 Drug: Pepcid 20 mg Route: PO;                                                           jl7 

17:30 Follow up: Response: No adverse reaction; Pain is decreased                             jl7 

17:10 Not Given (Other Intervention Used): Pepcid 20 mg IVP once                              jl7 

                                                                                                  

                                                                                                  

Outcome:                                                                                          

16:34 Discharge ordered by MD.                                                                pm1 

18:49 Discharged to nursing home. Report called to  YADIEL Sterling                                 jl7 

18:49 Condition: stable                                                                           

18:49 Discharge instructions given to patient, nursing home, Instructed on discharge              

      instructions, follow up and referral plans. Demonstrated understanding of instructions,     

      follow-up care.                                                                             

18:50 Patient left the ED.                                                                    jl7 

                                                                                                  

Signatures:                                                                                       

Dispatcher [employer]                           EDMS                                                 

Renetta Terry RN RN   iw                                                   

Nicole Perdomo, EKG Tech              EKG Tat1                                                  

Denver Barry, HOWARD                    NP   pm1                                                  

Kay Louie RN RN   tw2                                                  

Jama Bonilla RN RN   jl7                                                  

Rosa M Solis                            [facility]                                                  

                                                                                                  

Corrections: (The following items were deleted from the chart)                                    

16:24 14:32 Maintain EMS IV. Dressing intact. Site clean \T\ dry. Gauge \T\ site: 20 right AC. jl7jl
7

                                                                                                  

**************************************************************************************************

## 2019-01-23 NOTE — XMS REPORT
[facility]

 Created on:2018



Patient:Redd Dale

Sex:Male

:1985

External Reference #:[id number]





Demographics







 Address  [address] [place] [address]

 

 Phone  [phone]

 

 Preferred Language  en

 

 Marital Status  Unknown

 

 [employer] Affiliation  Unknown

 

 Race  Black or 

 

 Ethnic Group  Not  or 









Author







 Organization  [employer]









Care Team Providers







 Name  Role  Phone

 

 Knox, Na  Provider Role  Unavailable









Allergies

No Known Allergies



Problems







 Problem Type  Condition  Code  Onset Dates  Condition Status

 

 Problem  Nicotine dependence  F17.200    Active

 

 Problem  Lumbar spina bifida with  Q05.2    Active



   hydrocephalus      

 

 Problem  Dependence on wheelchair  Z99.3    Active

 

 Problem  Fecal incontinence  R15.9    Active

 

 Problem  Foot ulcer  L97.509    Active

 

 Problem  Depression with anxiety  F41.8    Active

 

 Problem  Paraplegia  G82.20    Active

 

 Problem  Constipation  K59.00    Active

 

 Problem  Incontinence of urine  R32    Active

 

 Problem  Nausea alone  R11.0    Active

 

 Problem  Episodic tension-type headache, not  G44.219    Active



   intractable      

 

 Problem  Nonintractable episodic headache,  R51    Active



   unspecified headache type      

 

 Problem   (ventriculoperitoneal) shunt  Z98.2    Active



   status      







Medications

No Known Medications



Results

No Known Results



Summary Purpose

[employer] Submission

## 2019-01-23 NOTE — XMS REPORT
Clinical Summary

 Created on:2019



Patient:Redd Dale

Sex:Male

:1985

External Reference #:[id number]





Demographics







 Address  [address]



   [place] [address]

 

 Home Phone  [phone]

 

 Preferred Language  English

 

 Marital Status  Unknown

 

 [employer] Affiliation  Unknown

 

 Race  Black or 

 

 Ethnic Group  Not  or 









Author







 Organization  [employer]

 

 Address  [address] [place] [address]









Support







 Name  Relationship  Address  Phone

 

 Sabrina Dale  Unavailable  [address]  [phone]



     [place] [address]  









Care Team Providers







 Name  Role  Phone

 

 Ta  Primary Care Provider  [phone]









Allergies







 Active Allergy  Reactions  Severity  Noted Date  Comments

 

 Sulfamethoxazole-Trimethoprim  Hives  High  2017  

 

 Levofloxacin  Hives  High  2017  

 

 Morphine  Hives  High  2017  

 

 Sesame Seed  Hives    2017  

 

 Ketorolac  Rash  High  2017  

 

 Vancomycin Analogues  Rash  High  2017  

 

 Ondansetron Hcl (Pf)  Nausea And Vomiting    2017  







Medications







 Medication  Sig  Dispensed  Refills  Start Date  End Date  Status

 

 oxyCODONE-acetaminophe  Take 1 tablet by    0      Active



 n (PERCOCET)  mg  mouth every 4          



 per tablet  (four) hours as          



   needed for Pain.          







Active Problems







 Problem  Noted Date

 

 Spina bifida  2017

 

 Pyelonephritis  2017







Social History







 Tobacco Use  Types  Packs/Day  Years Used  Date

 

 Current Every Day Smoker  Cigarettes  0.5    









 Tobacco Cessation: Ready to Quit: No









 Sex Assigned at Birth  Date Recorded

 

 Not on file  









 Job Start Date  Occupation  Industry

 

 Not on file  Not on file  Not on file









 Travel History  Travel Start  Travel End









 No recent travel history available.







Last Filed Vital Signs

Not on file



Plan of Treatment

Not on file



Results

Not on fileafter 2018



Insurance







 Payer  Benefit Plan / Group  Subscriber ID  Type  Phone  Address

 

 MEDICAID - MEDICAID  [employer]  [id number]  Medicaid    



 [facility]      Non-Contracted    









 Guarantor Name  Account Type  Relation to  Date of  Phone  Billing Address



     Patient  Birth    

 

 Redd Dale  Personal/Family  Self  1985  [phone]  [address]



         (Home)  [address]







           [place]



           [address]







Advance Directives

For more information, please contact:[facility], [place] [phone]





 Code Status  Date Activated  Date Inactivated  Comments

 

 Full Code  2017  1:36 AM  2017  8:43 PM  









 This code status was determined by:  Patient

## 2019-01-23 NOTE — XMS REPORT
Patient Summary Document

 Created on:2019



Patient:JORDAN VERDIN

Sex:Male

:1985

External Reference #:[id number]





Demographics







 Address  [address]



   [place] [address]

 

 Home Phone  [phone]

 

 Work Phone  [phone]

 

 Email Address  [phone]

 

 Preferred Language  Unknown

 

 Marital Status  Unknown

 

 Rastafarian Affiliation  Unknown

 

 Race  Unknown

 

 Additional Race(s)  Unavailable

 

 Ethnic Group  Unknown









Author







 Organization  [facility]

 

 Address  [address] [place] [address] [address]



   [place] [address]

 

 Phone  [phone]









Care Team Providers







 Name  Role  Phone

 

 RAMU TORRES  Unavailable  Unavailable









Problems

This patient has no known problems.



Allergies, Adverse Reactions, Alerts

This patient has no known allergies or adverse reactions.



Medications

This patient has no known medications.



Results







 Test Description  Test Time  Test Comments  Text Results  Atomic Results  
Result Comments









 BLOOD CULTURE  2017 16:22:00    









   Test Item  Value  Reference Range  Comments









 CULTURE (BEAKER) (test code=1095)  No growth in 5 days    



BLOOD [id number] 16:22:00





 Test Item  Value  Reference Range  Comments

 

 CULTURE (BEAKER) (test code=1095)  No growth in 5 days    



(MANUAL DIFFERENTIAL)2017 22:29:00





 Test Item  Value  Reference Range  Comments

 

 TOTAL COUNTED (BEAKER) (test code=1351)      

 

 WBC MORPHOLOGY (BEAKER) (test code=487)  Normal    

 

 PLT MORPHOLOGY (BEAKER) (test code=486)  Normal    

 

 RBC MORPHOLOGY (BEAKER) (test code=762)  Normal    



CBC W/PLT COUNT &amp; AUTO [id number] 22:28:00





 Test Item  Value  Reference Range  Comments

 

 WHITE BLOOD CELL COUNT (BEAKER) (test code=775)  7.3 K/ L  4.0-10.0  

 

 RED BLOOD CELL COUNT (BEAKER) (test code=761)  5.09 M/ L  4.20-5.80  

 

 HEMOGLOBIN (BEAKER) (test code=410)  13.0 GM/DL  13.0-16.8  

 

 HEMATOCRIT (BEAKER) (test code=411)  42.3 %  40.0-50.0  

 

 MEAN CORPUSCULAR VOLUME (BEAKER) (test code=753)  83.0 fL  82.0-98.0  

 

 MEAN CORPUSCULAR HEMOGLOBIN (BEAKER) (test  25.6 pg  27.0-33.0  



 code=751)      

 

 MEAN CORPUSCULAR HEMOGLOBIN CONC (BEAKER) (test  30.8 GM/DL  32.0-36.0  



 code=752)      

 

 RED CELL DISTRIBUTION WIDTH (BEAKER) (test  18.0 %  10.3-14.2  



 code=412)      

 

 PLATELET COUNT (BEAKER) (test code=756)  331 K/CU MM  150-430  

 

 MEAN PLATELET VOLUME (BEAKER) (test code=754)  8.5 fL  6.5-10.5  

 

 NUCLEATED RED BLOOD CELLS (BEAKER) (test  0 /100 WBC  0-0  



 code=413)      

 

 NEUTROPHILS RELATIVE PERCENT (BEAKER) (test  65 %    



 code=429)      

 

 LYMPHOCYTES RELATIVE PERCENT (BEAKER) (test  23 %    



 code=430)      

 

 MONOCYTES RELATIVE PERCENT (BEAKER) (test  8 %    



 code=431)      

 

 EOSINOPHILS RELATIVE PERCENT (BEAKER) (test  3 %    



 code=432)      

 

 BASOPHILS RELATIVE PERCENT (BEAKER) (test  1 %    



 code=437)      

 

 NEUTROPHILS ABSOLUTE COUNT (BEAKER) (test  4.75 K/ L  1.80-8.00  



 code=670)      

 

 LYMPHOCYTES ABSOLUTE COUNT (BEAKER) (test  1.68 K/ L  1.48-4.50  



 code=414)      

 

 MONOCYTES ABSOLUTE COUNT (BEAKER) (test  0.55 K/ L  0.00-1.30  



 code=415)      

 

 EOSINOPHILS ABSOLUTE COUNT (BEAKER) (test  0.24 K/ L  0.00-0.50  



 code=416)      

 

 BASOPHILS ABSOLUTE COUNT (BEAKER) (test  0.05 K/ L  0.00-0.20  



 code=417)      



[id number] METABOLIC [id number] 11:11:00





 Test Item  Value  Reference Range  Comments

 

 SODIUM (BEAKER) (test  138 meq/L  136-145  



 foec=397)      

 

 POTASSIUM (BEAKER) (test  4.0 meq/L  3.5-5.1  



 code=379)      

 

 CHLORIDE (BEAKER) (test  108 meq/L    



 code=382)      

 

 CO2 (BEAKER) (test  23 meq/L  22-29  



 code=355)      

 

 BLOOD UREA NITROGEN  11 mg/dL  7-21  



 (BEAKER) (test code=354)      

 

 CREATININE (BEAKER) (test  0.74 mg/dL  0.57-1.25  



 code=358)      

 

 GLUCOSE RANDOM (BEAKER)  124 mg/dL    



 (test code=652)      

 

 CALCIUM (BEAKER) (test  8.9 mg/dL  8.4-10.2  



 code=697)      

 

 EGFR (BEAKER) (test  150 mL/min/1.73 sq m    ESTIMATED GFR IS NOT AS



 code=1092)      ACCURATE AS CREATININE



       CLEARANCE IN PREDICTING



       GLOMERULAR FILTRATION



       RATE. ESTIMATED GFR IS



       NOT APPLICABLE FOR



       DIALYSIS PATIENTS.



URINE [id number] 09:56:00





 Test Item  Value  Reference Range  Comments

 

 CULTURE (BEAKER) (test code=1095)  <10,000 col/mL skin jaime    



COMPREHENSIVE METABOLIC [id number] 08:49:00





 Test Item  Value  Reference Range  Comments

 

 TOTAL PROTEIN (BEAKER)  7.3 gm/dL  6.0-8.3  



 (test code=770)      

 

 ALBUMIN (BEAKER) (test  3.3 g/dL  3.5-5.0  



 code=1145)      

 

 ALKALINE PHOSPHATASE  89 U/L    



 (BEAKER) (test code=346)      

 

 BILIRUBIN TOTAL (BEAKER)  1.4 mg/dL  0.2-1.2  



 (test code=377)      

 

 SODIUM (BEAKER) (test  137 meq/L  136-145  



 omiz=428)      

 

 POTASSIUM (BEAKER) (test  3.7 meq/L  3.5-5.1  



 code=379)      

 

 CHLORIDE (BEAKER) (test  108 meq/L    



 code=382)      

 

 CO2 (BEAKER) (test  17 meq/L  22-29  



 code=355)      

 

 BLOOD UREA NITROGEN  12 mg/dL  7-21  



 (BEAKER) (test code=354)      

 

 CREATININE (BEAKER) (test  0.78 mg/dL  0.57-1.25  



 code=358)      

 

 GLUCOSE RANDOM (BEAKER)  119 mg/dL    



 (test code=652)      

 

 CALCIUM (BEAKER) (test  8.6 mg/dL  8.4-10.2  



 code=697)      

 

 AST (SGOT) (BEAKER) (test  13 U/L  5-34  



 code=353)      

 

 ALT (SGPT) (BEAKER) (test  28 U/L  6-55  



 code=347)      

 

 EGFR (BEAKER) (test  141 mL/min/1.73 sq    ESTIMATED GFR IS NOT AS



 code=1092)  m    ACCURATE AS CREATININE



       CLEARANCE IN PREDICTING



       GLOMERULAR FILTRATION



       RATE. ESTIMATED GFR IS



       NOT APPLICABLE FOR



       DIALYSIS PATIENTS.



CBC W/PLT COUNT &amp; AUTO [id number] 08:46:00





 Test Item  Value  Reference Range  Comments

 

 WHITE BLOOD CELL COUNT (BEAKER) (test code=775)  9.4 K/ L  4.0-10.0  

 

 RED BLOOD CELL COUNT (BEAKER) (test code=761)  4.79 M/ L  4.20-5.80  

 

 HEMOGLOBIN (BEAKER) (test code=410)  12.8 GM/DL  13.0-16.8  

 

 HEMATOCRIT (BEAKER) (test code=411)  40.0 %  40.0-50.0  

 

 MEAN CORPUSCULAR VOLUME (BEAKER) (test code=753)  83.4 fL  82.0-98.0  

 

 MEAN CORPUSCULAR HEMOGLOBIN (BEAKER) (test  26.7 pg  27.0-33.0  



 code=751)      

 

 MEAN CORPUSCULAR HEMOGLOBIN CONC (BEAKER) (test  32.0 GM/DL  32.0-36.0  



 code=752)      

 

 RED CELL DISTRIBUTION WIDTH (BEAKER) (test  18.2 %  10.3-14.2  



 code=412)      

 

 PLATELET COUNT (BEAKER) (test code=756)  313 K/CU MM  150-430  

 

 MEAN PLATELET VOLUME (BEAKER) (test code=754)  8.6 fL  6.5-10.5  

 

 NUCLEATED RED BLOOD CELLS (BEAKER) (test  0 /100 WBC  0-0  



 code=413)      

 

 NEUTROPHILS RELATIVE PERCENT (BEAKER) (test  70 %    



 code=429)      

 

 LYMPHOCYTES RELATIVE PERCENT (BEAKER) (test  16 %    



 code=430)      

 

 MONOCYTES RELATIVE PERCENT (BEAKER) (test  12 %    



 code=431)      

 

 EOSINOPHILS RELATIVE PERCENT (BEAKER) (test  1 %    



 code=432)      

 

 BASOPHILS RELATIVE PERCENT (BEAKER) (test  1 %    



 code=437)      

 

 NEUTROPHILS ABSOLUTE COUNT (BEAKER) (test  6.54 K/ L  1.80-8.00  



 code=670)      

 

 LYMPHOCYTES ABSOLUTE COUNT (BEAKER) (test  1.50 K/ L  1.48-4.50  



 code=414)      

 

 MONOCYTES ABSOLUTE COUNT (BEAKER) (test  1.13 K/ L  0.00-1.30  



 code=415)      

 

 EOSINOPHILS ABSOLUTE COUNT (BEAKER) (test  0.13 K/ L  0.00-0.50  



 code=416)      

 

 BASOPHILS ABSOLUTE COUNT (BEAKER) (test  0.06 K/ L  0.00-0.20  



 code=417)      



0.00URINALYSIS W/ [id number] 20:36:00





 Test Item  Value  Reference Range  Comments

 

 COLOR (BEAKER) (test code=470)  Yellow    

 

 CLARITY (BEAKER) (test code=469)  Hazy    

 

 SPECIFIC GRAVITY UA (BEAKER) (test code=468)  1.012  1.001-1.035  

 

 PH UA (BEAKER) (test code=467)  5.5  5.0-8.0  

 

 PROTEIN UA (BEAKER) (test code=464)  100 mg/dL  Negative  

 

 GLUCOSE UA (BEAKER) (test code=365)  Negative  Negative  

 

 KETONES UA (BEAKER) (test code=371)  Negative  Negative  

 

 BILIRUBIN UA (BEAKER) (test code=462)  Negative  Negative  

 

 BLOOD UA (BEAKER) (test code=461)  Moderate  Negative  

 

 NITRITE UA (BEAKER) (test code=465)  Negative  Negative  

 

 LEUKOCYTE ESTERASE UA (BEAKER) (test code=466)  Large  Negative  

 

 UROBILINOGEN UA (BEAKER) (test code=463)  3.0 mg/dL  0.2-1.0  

 

 RBC UA (BEAKER) (test code=519)  19 /HPF    

 

 WBC UA (BEAKER) (test code=520)  182 /HPF    

 

 SOURCE(BEAKER) (test code=4013)      



BASIC METABOLIC [id number] 17:00:00





 Test Item  Value  Reference Range  Comments

 

 SODIUM (BEAKER) (test  140 meq/L  136-145  



 vshx=789)      

 

 POTASSIUM (BEAKER) (test  3.7 meq/L  3.5-5.1  



 code=379)      

 

 CHLORIDE (BEAKER) (test  112 meq/L    



 code=382)      

 

 CO2 (BEAKER) (test  17 meq/L  22-29  



 code=355)      

 

 BLOOD UREA NITROGEN  23 mg/dL  7-21  



 (BEAKER) (test code=354)      

 

 CREATININE (BEAKER) (test  1.00 mg/dL  0.57-1.25  



 code=358)      

 

 GLUCOSE RANDOM (BEAKER)  102 mg/dL    



 (test code=652)      

 

 CALCIUM (BEAKER) (test  8.7 mg/dL  8.4-10.2  



 code=697)      

 

 EGFR (BEAKER) (test  106 mL/min/1.73 sq m    ESTIMATED GFR IS NOT AS



 code=1092)      ACCURATE AS CREATININE



       CLEARANCE IN PREDICTING



       GLOMERULAR FILTRATION



       RATE. ESTIMATED GFR IS



       NOT APPLICABLE FOR



       DIALYSIS PATIENTS.



Specimen slightly ictericCBC W/PLT COUNT &amp; AUTO [id number] 12:18
:00





 Test Item  Value  Reference Range  Comments

 

 WHITE BLOOD CELL COUNT (BEAKER) (test code=775)  16.4 K/ L  4.0-10.0  

 

 RED BLOOD CELL COUNT (BEAKER) (test code=761)  5.22 M/ L  4.20-5.80  

 

 HEMOGLOBIN (BEAKER) (test code=410)  14.4 GM/DL  13.0-16.8  

 

 HEMATOCRIT (BEAKER) (test code=411)  42.9 %  40.0-50.0  

 

 MEAN CORPUSCULAR VOLUME (BEAKER) (test code=753)  82.2 fL  82.0-98.0  

 

 MEAN CORPUSCULAR HEMOGLOBIN (BEAKER) (test  27.5 pg  27.0-33.0  



 code=751)      

 

 MEAN CORPUSCULAR HEMOGLOBIN CONC (BEAKER) (test  33.5 GM/DL  32.0-36.0  



 code=752)      

 

 RED CELL DISTRIBUTION WIDTH (BEAKER) (test  16.2 %  10.3-14.2  



 code=412)      

 

 PLATELET COUNT (BEAKER) (test code=756)  329 K/CU MM  150-430  

 

 MEAN PLATELET VOLUME (BEAKER) (test code=754)  8.2 fL  6.5-10.5  

 

 NUCLEATED RED BLOOD CELLS (BEAKER) (test  0 /100 WBC  0-0  



 code=413)      

 

 NEUTROPHILS RELATIVE PERCENT (BEAKER) (test  83 %    



 code=429)      

 

 LYMPHOCYTES RELATIVE PERCENT (BEAKER) (test  7 %    



 code=430)      

 

 MONOCYTES RELATIVE PERCENT (BEAKER) (test  9 %    



 code=431)      

 

 EOSINOPHILS RELATIVE PERCENT (BEAKER) (test  0 %    



 code=432)      

 

 BASOPHILS RELATIVE PERCENT (BEAKER) (test  0 %    



 code=437)      

 

 NEUTROPHILS ABSOLUTE COUNT (BEAKER) (test  1.62 K/ L  1.80-8.00  



 code=670)      

 

 LYMPHOCYTES ABSOLUTE COUNT (BEAKER) (test  1.15 K/ L  1.48-4.50  



 code=414)      

 

 MONOCYTES ABSOLUTE COUNT (BEAKER) (test  1.56 K/ L  0.00-1.30  



 code=415)      

 

 EOSINOPHILS ABSOLUTE COUNT (BEAKER) (test  0.01 K/ L  0.00-0.50  



 code=416)      

 

 BASOPHILS ABSOLUTE COUNT (BEAKER) (test  0.02 K/ L  0.00-0.20  



 code=417)      



(MANUAL DIFFERENTIAL)2017 12:18:00





 Test Item  Value  Reference Range  Comments

 

 TOTAL COUNTED (BEAKER) (test code=1351)      

 

 WBC MORPHOLOGY (BEAKER) (test code=487)  Normal    

 

 PLT MORPHOLOGY (BEAKER) (test code=486)  Normal    

 

 RBC MORPHOLOGY (BEAKER) (test code=762)  Normal

## 2019-02-20 ENCOUNTER — HOSPITAL ENCOUNTER (EMERGENCY)
Dept: HOSPITAL 97 - ER | Age: 34
Discharge: HOME | End: 2019-02-20
Payer: COMMERCIAL

## 2019-02-20 VITALS — TEMPERATURE: 98 F

## 2019-02-20 VITALS — SYSTOLIC BLOOD PRESSURE: 131 MMHG | DIASTOLIC BLOOD PRESSURE: 89 MMHG | OXYGEN SATURATION: 99 %
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
## 2019-02-20 DIAGNOSIS — Q05.9: ICD-10-CM

## 2019-02-20 DIAGNOSIS — G80.9: ICD-10-CM

## 2019-02-20 DIAGNOSIS — Z88.3: ICD-10-CM

## 2019-02-20 DIAGNOSIS — Z88.8: ICD-10-CM

## 2019-02-20 DIAGNOSIS — I10: ICD-10-CM

## 2019-02-20 DIAGNOSIS — S00.90XA: Primary | ICD-10-CM
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
## 2019-02-20 DIAGNOSIS — Z88.5: ICD-10-CM
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
## 2019-02-20 DIAGNOSIS — Z88.1: ICD-10-CM

## 2019-02-20 PROCEDURE — 99284 EMERGENCY DEPT VISIT MOD MDM: CPT

## 2019-02-20 PROCEDURE — 70450 CT HEAD/BRAIN W/O DYE: CPT

## 2019-02-20 PROCEDURE — 72125 CT NECK SPINE W/O DYE: CPT

## 2019-02-20 NOTE — XMS REPORT
[facility]

 Created on:2018



Patient:Redd Dale

Sex:Male

:1985

External Reference #:[id number]





Demographics







 Address  [address]  [place] [address]

 

 Phone  [phone]

 

 Preferred Language  en

 

 Marital Status  Unknown

 

 Hindu Affiliation  Unknown

 

 Race  Black or 

 

 Ethnic Group  Not  or 









Author







 Organization  [employer]









Care Team Providers







 Name  Role  Phone

 

 Knox, Na  Provider Role  Unavailable









Allergies, Adverse Reactions, Alerts







 Substance  Reaction  Event Type

 

 Toradol  Info Not Available  Drug Allergy

 

 Sulfa  Info Not Available  Drug Allergy

 

 Zofran  Info Not Available  Drug Allergy

 

 Morphine Sulfate ER  Info Not Available  Drug Allergy

 

 Levaquin  Info Not Available  Drug Allergy







Problems







 Problem Type  Condition  Code  Onset Dates  Condition Status

 

 Problem  Nicotine dependence  F17.200    Active

 

 Problem  Lumbar spina bifida with  Q05.2    Active



   hydrocephalus      

 

 Problem  Dependence on wheelchair  Z99.3    Active

 

 Problem  Fecal incontinence  R15.9    Active

 

 Problem  Foot ulcer  L97.509    Active

 

 Problem  Depression with anxiety  F41.8    Active

 

 Problem  Paraplegia  G82.20    Active

 

 Problem  Constipation  K59.00    Active

 

 Problem  Incontinence of urine  R32    Active

 

 Problem  Nausea alone  R11.0    Active

 

 Assessment  Episodic tension-type headache, not  G44.219    Active



   intractable      

 

 Assessment   (ventriculoperitoneal) shunt  Z98.2    Active



   status      

 

 Assessment  Ulcer of left foot, unspecified  L97.529    Active



   ulcer stage      

 

 Assessment  Nonintractable episodic headache,  R51    Active



   unspecified headache type      

 

 Assessment  Depression with anxiety  F41.8    Active

 

 Problem  Episodic tension-type headache, not  G44.219    Active



   intractable      

 

 Assessment  Lumbar spina bifida with  Q05.2    Active



   hydrocephalus      

 

 Problem  Nonintractable episodic headache,  R51    Active



   unspecified headache type      

 

 Assessment  Nausea and vomiting, intractability  R11.2    Active



   of vomiting not specified,      



   unspecified vomiting type      

 

 Problem   (ventriculoperitoneal) shunt  Z98.2    Active



   status      







Medications







 Medication  Code  Code  Instructions  Start  End  Status  Dosage



   System      Date  Date    

 

 Tylenol with  NDC  90679787221  300-60 MG      Active  1 tablet as



 Codeine #4      Orally every 6        needed



       hrs        

 

 Macrobid  NDC  [id number]  100 MG Orally      Active  1 capsule



       every 12 hrs        with food

 

 Pantoprazole  NDC  [id number]  40 MG Orally      Active  1 tablet



 Sodium      Once a day        

 

 Collagenase  NDC  [address]-61  250 UNIT/GM      Active  1 application



       Externally        to affected



       Once a day        area







Results

No Known Results



Summary Purpose

[employer] Submission

## 2019-02-20 NOTE — XMS REPORT
Patient Summary Document

 Created on:2019



Patient:JORDAN VERDIN

Sex:Male

:1985

External Reference #:[id number]





Demographics







 Address  [address]



   [place] [address]

 

 Home Phone  [phone]

 

 Preferred Language  Unknown

 

 Marital Status  Unknown

 

 Yarsani Affiliation  Unknown

 

 Race  Unknown

 

 Additional Race(s)  Unavailable

 

 Ethnic Group  Unknown









Author







 Organization  [facility]

 

 Address  [facility] [address] [place] [address]

 

 Phone  [phone]









Care Team Providers







 Name  Role  Phone

 

 MELISSARAMU  Unavailable  Unavailable









Problems

This patient has no known problems.



Allergies, Adverse Reactions, Alerts

This patient has no known allergies or adverse reactions.



Medications

This patient has no known medications.



Results







 Test Description  Test Time  Test Comments  Text Results  Atomic Results  
Result Comments









 BLOOD CULTURE  2017 16:22:00    









   Test Item  Value  Reference Range  Comments









 CULTURE (BEAKER) (test code=1095)  No growth in 5 days    



BLOOD [id number] 16:22:00





 Test Item  Value  Reference Range  Comments

 

 CULTURE (BEAKER) (test code=1095)  No growth in 5 days    



(MANUAL DIFFERENTIAL)2017 22:29:00





 Test Item  Value  Reference Range  Comments

 

 TOTAL COUNTED (BEAKER) (test code=1351)      

 

 WBC MORPHOLOGY (BEAKER) (test code=487)  Normal    

 

 PLT MORPHOLOGY (BEAKER) (test code=486)  Normal    

 

 RBC MORPHOLOGY (BEAKER) (test code=762)  Normal    



CBC W/PLT COUNT &amp; AUTO [id number] 22:28:00





 Test Item  Value  Reference Range  Comments

 

 WHITE BLOOD CELL COUNT (BEAKER) (test code=775)  7.3 K/ L  4.0-10.0  

 

 RED BLOOD CELL COUNT (BEAKER) (test code=761)  5.09 M/ L  4.20-5.80  

 

 HEMOGLOBIN (BEAKER) (test code=410)  13.0 GM/DL  13.0-16.8  

 

 HEMATOCRIT (BEAKER) (test code=411)  42.3 %  40.0-50.0  

 

 MEAN CORPUSCULAR VOLUME (BEAKER) (test code=753)  83.0 fL  82.0-98.0  

 

 MEAN CORPUSCULAR HEMOGLOBIN (BEAKER) (test  25.6 pg  27.0-33.0  



 code=751)      

 

 MEAN CORPUSCULAR HEMOGLOBIN CONC (BEAKER) (test  30.8 GM/DL  32.0-36.0  



 code=752)      

 

 RED CELL DISTRIBUTION WIDTH (BEAKER) (test  18.0 %  10.3-14.2  



 code=412)      

 

 PLATELET COUNT (BEAKER) (test code=756)  331 K/CU MM  150-430  

 

 MEAN PLATELET VOLUME (BEAKER) (test code=754)  8.5 fL  6.5-10.5  

 

 NUCLEATED RED BLOOD CELLS (BEAKER) (test  0 /100 WBC  0-0  



 code=413)      

 

 NEUTROPHILS RELATIVE PERCENT (BEAKER) (test  65 %    



 code=429)      

 

 LYMPHOCYTES RELATIVE PERCENT (BEAKER) (test  23 %    



 code=430)      

 

 MONOCYTES RELATIVE PERCENT (BEAKER) (test  8 %    



 code=431)      

 

 EOSINOPHILS RELATIVE PERCENT (BEAKER) (test  3 %    



 code=432)      

 

 BASOPHILS RELATIVE PERCENT (BEAKER) (test  1 %    



 code=437)      

 

 NEUTROPHILS ABSOLUTE COUNT (BEAKER) (test  4.75 K/ L  1.80-8.00  



 code=670)      

 

 LYMPHOCYTES ABSOLUTE COUNT (BEAKER) (test  1.68 K/ L  1.48-4.50  



 code=414)      

 

 MONOCYTES ABSOLUTE COUNT (BEAKER) (test  0.55 K/ L  0.00-1.30  



 code=415)      

 

 EOSINOPHILS ABSOLUTE COUNT (BEAKER) (test  0.24 K/ L  0.00-0.50  



 code=416)      

 

 BASOPHILS ABSOLUTE COUNT (BEAKER) (test  0.05 K/ L  0.00-0.20  



 code=417)      



[id number] METABOLIC [id number] 11:11:00





 Test Item  Value  Reference Range  Comments

 

 SODIUM (BEAKER) (test  138 meq/L  136-145  



 tcpd=167)      

 

 POTASSIUM (BEAKER) (test  4.0 meq/L  3.5-5.1  



 code=379)      

 

 CHLORIDE (BEAKER) (test  108 meq/L    



 code=382)      

 

 CO2 (BEAKER) (test  23 meq/L  22-29  



 code=355)      

 

 BLOOD UREA NITROGEN  11 mg/dL  7-21  



 (BEAKER) (test code=354)      

 

 CREATININE (BEAKER) (test  0.74 mg/dL  0.57-1.25  



 code=358)      

 

 GLUCOSE RANDOM (BEAKER)  124 mg/dL    



 (test code=652)      

 

 CALCIUM (BEAKER) (test  8.9 mg/dL  8.4-10.2  



 code=697)      

 

 EGFR (BEAKER) (test  150 mL/min/1.73 sq m    ESTIMATED GFR IS NOT AS



 code=1092)      ACCURATE AS CREATININE



       CLEARANCE IN PREDICTING



       GLOMERULAR FILTRATION



       RATE. ESTIMATED GFR IS



       NOT APPLICABLE FOR



       DIALYSIS PATIENTS.



URINE [id number] 09:56:00





 Test Item  Value  Reference Range  Comments

 

 CULTURE (BEAKER) (test code=1095)  <10,000 col/mL skin jaime    



COMPREHENSIVE METABOLIC [id number] 08:49:00





 Test Item  Value  Reference Range  Comments

 

 TOTAL PROTEIN (BEAKER)  7.3 gm/dL  6.0-8.3  



 (test code=770)      

 

 ALBUMIN (BEAKER) (test  3.3 g/dL  3.5-5.0  



 code=1145)      

 

 ALKALINE PHOSPHATASE  89 U/L    



 (BEAKER) (test code=346)      

 

 BILIRUBIN TOTAL (BEAKER)  1.4 mg/dL  0.2-1.2  



 (test code=377)      

 

 SODIUM (BEAKER) (test  137 meq/L  136-145  



 rqos=547)      

 

 POTASSIUM (BEAKER) (test  3.7 meq/L  3.5-5.1  



 code=379)      

 

 CHLORIDE (BEAKER) (test  108 meq/L    



 code=382)      

 

 CO2 (BEAKER) (test  17 meq/L  22-29  



 code=355)      

 

 BLOOD UREA NITROGEN  12 mg/dL  7-21  



 (BEAKER) (test code=354)      

 

 CREATININE (BEAKER) (test  0.78 mg/dL  0.57-1.25  



 code=358)      

 

 GLUCOSE RANDOM (BEAKER)  119 mg/dL    



 (test code=652)      

 

 CALCIUM (BEAKER) (test  8.6 mg/dL  8.4-10.2  



 code=697)      

 

 AST (SGOT) (BEAKER) (test  13 U/L  5-34  



 code=353)      

 

 ALT (SGPT) (BEAKER) (test  28 U/L  6-55  



 code=347)      

 

 EGFR (BEAKER) (test  141 mL/min/1.73 sq    ESTIMATED GFR IS NOT AS



 code=1092)  m    ACCURATE AS CREATININE



       CLEARANCE IN PREDICTING



       GLOMERULAR FILTRATION



       RATE. ESTIMATED GFR IS



       NOT APPLICABLE FOR



       DIALYSIS PATIENTS.



CBC W/PLT COUNT &amp; AUTO [id number] 08:46:00





 Test Item  Value  Reference Range  Comments

 

 WHITE BLOOD CELL COUNT (BEAKER) (test code=775)  9.4 K/ L  4.0-10.0  

 

 RED BLOOD CELL COUNT (BEAKER) (test code=761)  4.79 M/ L  4.20-5.80  

 

 HEMOGLOBIN (BEAKER) (test code=410)  12.8 GM/DL  13.0-16.8  

 

 HEMATOCRIT (BEAKER) (test code=411)  40.0 %  40.0-50.0  

 

 MEAN CORPUSCULAR VOLUME (BEAKER) (test code=753)  83.4 fL  82.0-98.0  

 

 MEAN CORPUSCULAR HEMOGLOBIN (BEAKER) (test  26.7 pg  27.0-33.0  



 code=751)      

 

 MEAN CORPUSCULAR HEMOGLOBIN CONC (BEAKER) (test  32.0 GM/DL  32.0-36.0  



 code=752)      

 

 RED CELL DISTRIBUTION WIDTH (BEAKER) (test  18.2 %  10.3-14.2  



 code=412)      

 

 PLATELET COUNT (BEAKER) (test code=756)  313 K/CU MM  150-430  

 

 MEAN PLATELET VOLUME (BEAKER) (test code=754)  8.6 fL  6.5-10.5  

 

 NUCLEATED RED BLOOD CELLS (BEAKER) (test  0 /100 WBC  0-0  



 code=413)      

 

 NEUTROPHILS RELATIVE PERCENT (BEAKER) (test  70 %    



 code=429)      

 

 LYMPHOCYTES RELATIVE PERCENT (BEAKER) (test  16 %    



 code=430)      

 

 MONOCYTES RELATIVE PERCENT (BEAKER) (test  12 %    



 code=431)      

 

 EOSINOPHILS RELATIVE PERCENT (BEAKER) (test  1 %    



 code=432)      

 

 BASOPHILS RELATIVE PERCENT (BEAKER) (test  1 %    



 code=437)      

 

 NEUTROPHILS ABSOLUTE COUNT (BEAKER) (test  6.54 K/ L  1.80-8.00  



 code=670)      

 

 LYMPHOCYTES ABSOLUTE COUNT (BEAKER) (test  1.50 K/ L  1.48-4.50  



 code=414)      

 

 MONOCYTES ABSOLUTE COUNT (BEAKER) (test  1.13 K/ L  0.00-1.30  



 code=415)      

 

 EOSINOPHILS ABSOLUTE COUNT (BEAKER) (test  0.13 K/ L  0.00-0.50  



 code=416)      

 

 BASOPHILS ABSOLUTE COUNT (BEAKER) (test  0.06 K/ L  0.00-0.20  



 code=417)      



0.00URINALYSIS W/ [id number] 20:36:00





 Test Item  Value  Reference Range  Comments

 

 COLOR (BEAKER) (test code=470)  Yellow    

 

 CLARITY (BEAKER) (test code=469)  Hazy    

 

 SPECIFIC GRAVITY UA (BEAKER) (test code=468)  1.012  1.001-1.035  

 

 PH UA (BEAKER) (test code=467)  5.5  5.0-8.0  

 

 PROTEIN UA (BEAKER) (test code=464)  100 mg/dL  Negative  

 

 GLUCOSE UA (BEAKER) (test code=365)  Negative  Negative  

 

 KETONES UA (BEAKER) (test code=371)  Negative  Negative  

 

 BILIRUBIN UA (BEAKER) (test code=462)  Negative  Negative  

 

 BLOOD UA (BEAKER) (test code=461)  Moderate  Negative  

 

 NITRITE UA (BEAKER) (test code=465)  Negative  Negative  

 

 LEUKOCYTE ESTERASE UA (BEAKER) (test code=466)  Large  Negative  

 

 UROBILINOGEN UA (BEAKER) (test code=463)  3.0 mg/dL  0.2-1.0  

 

 RBC UA (BEAKER) (test code=519)  19 /HPF    

 

 WBC UA (BEAKER) (test code=520)  182 /HPF    

 

 SOURCE(BEAKER) (test code=2795)      



BASIC METABOLIC [id number] 17:00:00





 Test Item  Value  Reference Range  Comments

 

 SODIUM (BEAKER) (test  140 meq/L  136-145  



 nyqn=482)      

 

 POTASSIUM (BEAKER) (test  3.7 meq/L  3.5-5.1  



 code=379)      

 

 CHLORIDE (BEAKER) (test  112 meq/L    



 code=382)      

 

 CO2 (BEAKER) (test  17 meq/L  22-29  



 code=355)      

 

 BLOOD UREA NITROGEN  23 mg/dL  7-21  



 (BEAKER) (test code=354)      

 

 CREATININE (BEAKER) (test  1.00 mg/dL  0.57-1.25  



 code=358)      

 

 GLUCOSE RANDOM (BEAKER)  102 mg/dL    



 (test code=652)      

 

 CALCIUM (BEAKER) (test  8.7 mg/dL  8.4-10.2  



 code=697)      

 

 EGFR (BEAKER) (test  106 mL/min/1.73 sq m    ESTIMATED GFR IS NOT AS



 code=1092)      ACCURATE AS CREATININE



       CLEARANCE IN PREDICTING



       GLOMERULAR FILTRATION



       RATE. ESTIMATED GFR IS



       NOT APPLICABLE FOR



       DIALYSIS PATIENTS.



Specimen slightly ictericCBC W/PLT COUNT &amp; AUTO [id number] 12:18
:00





 Test Item  Value  Reference Range  Comments

 

 WHITE BLOOD CELL COUNT (BEAKER) (test code=775)  16.4 K/ L  4.0-10.0  

 

 RED BLOOD CELL COUNT (BEAKER) (test code=761)  5.22 M/ L  4.20-5.80  

 

 HEMOGLOBIN (BEAKER) (test code=410)  14.4 GM/DL  13.0-16.8  

 

 HEMATOCRIT (BEAKER) (test code=411)  42.9 %  40.0-50.0  

 

 MEAN CORPUSCULAR VOLUME (BEAKER) (test code=753)  82.2 fL  82.0-98.0  

 

 MEAN CORPUSCULAR HEMOGLOBIN (BEAKER) (test  27.5 pg  27.0-33.0  



 code=751)      

 

 MEAN CORPUSCULAR HEMOGLOBIN CONC (BEAKER) (test  33.5 GM/DL  32.0-36.0  



 code=752)      

 

 RED CELL DISTRIBUTION WIDTH (BEAKER) (test  16.2 %  10.3-14.2  



 code=412)      

 

 PLATELET COUNT (BEAKER) (test code=756)  329 K/CU MM  150-430  

 

 MEAN PLATELET VOLUME (BEAKER) (test code=754)  8.2 fL  6.5-10.5  

 

 NUCLEATED RED BLOOD CELLS (BEAKER) (test  0 /100 WBC  0-0  



 code=413)      

 

 NEUTROPHILS RELATIVE PERCENT (BEAKER) (test  83 %    



 code=429)      

 

 LYMPHOCYTES RELATIVE PERCENT (BEAKER) (test  7 %    



 code=430)      

 

 MONOCYTES RELATIVE PERCENT (BEAKER) (test  9 %    



 code=431)      

 

 EOSINOPHILS RELATIVE PERCENT (BEAKER) (test  0 %    



 code=432)      

 

 BASOPHILS RELATIVE PERCENT (BEAKER) (test  0 %    



 code=437)      

 

 NEUTROPHILS ABSOLUTE COUNT (BEAKER) (test  1.62 K/ L  1.80-8.00  



 code=670)      

 

 LYMPHOCYTES ABSOLUTE COUNT (BEAKER) (test  1.15 K/ L  1.48-4.50  



 code=414)      

 

 MONOCYTES ABSOLUTE COUNT (BEAKER) (test  1.56 K/ L  0.00-1.30  



 code=415)      

 

 EOSINOPHILS ABSOLUTE COUNT (BEAKER) (test  0.01 K/ L  0.00-0.50  



 code=416)      

 

 BASOPHILS ABSOLUTE COUNT (BEAKER) (test  0.02 K/ L  0.00-0.20  



 code=417)      



(MANUAL DIFFERENTIAL)2017 12:18:00





 Test Item  Value  Reference Range  Comments

 

 TOTAL COUNTED (BEAKER) (test code=1351)      

 

 WBC MORPHOLOGY (BEAKER) (test code=487)  Normal    

 

 PLT MORPHOLOGY (BEAKER) (test code=486)  Normal    

 

 RBC MORPHOLOGY (BEAKER) (test code=762)  Normal

## 2019-02-20 NOTE — RAD REPORT
EXAM DESCRIPTION:  CT - Head C Spine Mpr Wo Con - 2/20/2019 1:44 pm

 

CLINICAL HISTORY:  Head and neck injury status post fall. Head and neck pain

 

COMPARISON:  2018

 

TECHNIQUE:  Computed axial tomography of the head and cervical spine was obtained.

 

Sagittal and coronal reconstruction was performed.

 

All CT scans are performed using dose optimization technique as appropriate and may include automated
 exposure control or mA/KV adjustment according to patient size.

 

FINDINGS:  Ventricular shunts remain in place. Ventricles are extremely small unchanged from the prio
r exam. Congenital brain anomalies noted.

 

An intracranial bleed is not seen. An extra-axial fluid collection is not noted.Fluid within the visu
alized sinuses and mastoids is not seen

 

A cervical fracture is not visualized. No dislocation is noted. Loss of the normal lordosis may be se
condary to muscle spasm.

 

IMPRESSION:  No acute intracranial abnormality is seen.

 

A cervical fracture is not visualized.  If the patient continues to have symptoms to suggest intracra
nial /spinal cord pathology then MRI would be recommended

## 2019-02-20 NOTE — XMS REPORT
[facility]

 Created on:September 15, 2018



Patient:Redd Dale

Sex:Male

:1985

External Reference #:[id number]





Demographics







 Address  [address] [place] [address]

 

 Phone  [phone]

 

 Preferred Language  en

 

 Marital Status  Unknown

 

 Yarsani Affiliation  Unknown

 

 Race  Black or 

 

 Ethnic Group  Unknown









Author







 Organization  [employer]









Care Team Providers







 Name  Role  Phone

 

 Knox, Na  Provider Role  Unavailable









Allergies, Adverse Reactions, Alerts







 Substance  Reaction  Event Type

 

 Zofran  Info Not Available  Drug Allergy

 

 Morphine Sulfate ER  Info Not Available  Drug Allergy

 

 Levaquin  Info Not Available  Drug Allergy







Problems







 Problem Type  Condition  Code  Onset Dates  Condition Status

 

 Assessment  Blood tests for routine general  Z00.00    Active



   physical examination      

 

 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



   disorder      

 

 Problem  Constipation  K59.00    Active

 

 Assessment  Ulcer of left foot, unspecified  L97.529    Active



   ulcer stage      

 

 Problem  Paraplegia  G82.20    Active

 

 Assessment   (ventriculoperitoneal) shunt  Z98.2    Active



   status      

 

 Problem  Nausea alone  R11.0    Active

 

 Problem  Fecal incontinence  R15.9    Active

 

 Problem  Incontinence of urine  R32    Active

 

 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



   disorder      

 

 Problem  Mental disorder, not otherwise  F99    Active



   specified      

 

 Assessment  Depression with anxiety  F41.8    Active

 

 Assessment  Bipolar depression  F31.30    Active

 

 Problem  Bipolar depression  F31.30    Active

 

 Assessment  Lumbar spina bifida with  Q05.2    Active



   hydrocephalus      

 

 Problem  Blood tests for routine general  Z00.00    Active



   physical examination      

 

 Problem  Depression with anxiety  F41.8    Active

 

 Problem  Nausea and vomiting, intractability  R11.2    Active



   of vomiting not specified,      



   unspecified vomiting type      

 

 Problem  Ulcer of left foot, unspecified  L97.529    Active



   ulcer stage      

 

 Problem  Nonintractable episodic headache,  R51    Active



   unspecified headache type      

 

 Problem   (ventriculoperitoneal) shunt  Z98.2    Active



   status      

 

 Problem  Episodic tension-type headache, not  G44.219    Active



   intractable      

 

 Problem  Lumbar spina bifida with  Q05.2    Active



   hydrocephalus      

 

 Problem  Foot ulcer  L97.509    Active

 

 Problem  Nicotine dependence  F17.200    Active

 

 Problem  Dependence on wheelchair  Z99.3    Active







Medications







 Medication  Code  Code  Instructions  Start  End  Status  Dosage



   System      Date  Date    

 

 Macrobid  NDC  89605985948  100 MG Orally      Active  1 capsule



       every 12 hrs        with food

 

 Tylenol with  NDC  [id number]  300-60 MG      Active  1 tablet as



 Codeine #4      Orally every 6        needed



       hrs        

 

 Pantoprazole  NDC  [id number]  40 MG Orally      Active  1 tablet



 Sodium      Once a day        

 

 Citalopram  NDC  [id number]  10 MG Orally      Active  1 tablet



 Hydrobromide      Once a day        

 

 Collagenase  NDC  [address]-52  250 UNIT/GM      Active  1 application



       Externally        to affected



       Once a day        area

 

 Seroquel  NDC  [id number]  25 MG Orally      Active  1 tablet



       Once a day at        



       bedtime        







Results

No Known Results



Summary Purpose

[employer] Submission

## 2019-02-20 NOTE — XMS REPORT
[facility]

 Created on:2018



Patient:Redd Dale

Sex:Male

:1985

External Reference #:[id number]





Demographics







 Address  [address] [place] [address]

 

 Phone  [phone]

 

 Preferred Language  en

 

 Marital Status  Unknown

 

 Orthodox Affiliation  Unknown

 

 Race  Black or 

 

 Ethnic Group  Not  or 









Author







 Organization  [employer]









Care Team Providers







 Name  Role  Phone

 

 Knox, Na  Provider Role  Unavailable









Allergies

No Known Allergies



Problems







 Problem Type  Condition  Code  Onset Dates  Condition Status

 

 Problem  Nicotine dependence  F17.200    Active

 

 Problem  Lumbar spina bifida with  Q05.2    Active



   hydrocephalus      

 

 Problem  Dependence on wheelchair  Z99.3    Active

 

 Problem  Fecal incontinence  R15.9    Active

 

 Problem  Foot ulcer  L97.509    Active

 

 Problem  Depression with anxiety  F41.8    Active

 

 Problem  Paraplegia  G82.20    Active

 

 Problem  Constipation  K59.00    Active

 

 Problem  Incontinence of urine  R32    Active

 

 Problem  Nausea alone  R11.0    Active

 

 Problem  Episodic tension-type headache, not  G44.219    Active



   intractable      

 

 Problem  Nonintractable episodic headache,  R51    Active



   unspecified headache type      

 

 Problem   (ventriculoperitoneal) shunt  Z98.2    Active



   status      







Medications

No Known Medications



Results

No Known Results



Summary Purpose

[employer]

## 2019-02-20 NOTE — XMS REPORT
Continuity of Care Document

 Created on:2018



Patient:JORDAN VERDIN JR

Sex:Male

:1985

External Reference #:[id number]





Demographics







 Address  [address] [place] [address] [address] [place] [id number]

 

 Phone  [phone]

 

 Phone  [phone]

 

 Preferred Language  Unknown

 

 Marital Status  Unknown

 

 Confucianist Affiliation  Unknown

 

 Race  Unknown

 

 Ethnic Group  Unknown









Author







 Organization  [facility]









Problems







 Problem  Status  Onset  Classification  Date  Comments  Source



     Date    Reported    



             



             



             

 

 HEADACHE  Active  20        [address]



             

 

 SHUNT  Active  20        [address]



             

 

 ACUTE HEADACHE  Active  20        [address]



             

 

 Pressure ulcer  Active  20  Finding  2018    [facility]



 of right ankle,    18        [facility]



 stage 3            [place]



             



             

 

 Nausea &  Active  20  Finding  2018    [facility]



 vomiting    18        [facility]



             [place]



             



             

 

 Nausea and  Active  20  Finding  2018    [facility]



 vomiting    18        [facility]



             [place]



             



             

 

 Decubitus ulcer  Active  20  Finding  2018    [facility]



 of right ankle,    18        [facility]



 stage 3            [place]



             



             

 

 Chronic  Active  20  Finding  2018    [facility]



 indwelling Ortega    17        [facility]



 catheter            [place]



             



             

 

 Sacral ulcer  Resolved  20  Finding  2018    [facility]



     17        [facility]



             [place]



             



             

 

 Heel ulcer  Active  20  Finding  2018    [facility]



     17        [facility]



             [place]



             



             

 

 Cellulitis  Resolved  20  Finding  2018    [facility]



     17        [facility]



             [place]



             



             

 

 Lymphedema  Active  20  Finding  2018    [facility]



     17        [facility]



             [place]



             



             

 

 Chronic pain  Active  20  Finding  2018    [facility]



 disorder    17        [facility]



             [place]



             



             

 

 GERD  Active  20  Finding  2018    [facility]



     17        [facility]



             [place]



             



             

 

 Hypertension  Active  20  Finding  2018    [facility]



     17        [facility]



             [place]



             



             

 

 Spina bifida  Active  20  Finding  2018    [facility]



     17        [facility]



             [place]



             



             

 

 Stage II  Resolved  20  Finding  2018    [facility]



 pressure ulcer    16        [facility]



 of buttock            [place]



             



             

 

 Urinary tract  Resolved  07/22/20  Finding  2018    [facility]



 infectious    16        [facility]



 disease            [place]



             



             

 

 Stage IV  Active  20  Finding  2018    [facility]



 pressure ulcer    16        [facility]



 of heel            [place]



             



             

 

 Paraplegic  Active  20  Finding  2018    [facility]



 spinal paralysis    16        [facility]



             [place]



             



             

 

 Malaise  Active  10/16/20  Finding  2018    [facility]



     15        [facility]



             [place]



             



             

 

 Wound of left  Resolved  10/16/20  Finding  2018    [facility]



 ankle    15        [facility]



             [place]



             



             

 

 Leukocytosis  Active  10/16/20  Finding  2018    [facility]



     15        [facility]



             [place]



             



             

 

 Rectal  Resolved  10/16/20  Finding  2018    [facility]



 hemorrhage    15        [facility]



             [place]



             



             

 

 Decubitus ulcer,  Resolved  20  Finding  2018    [facility]



 infected    14        [facility]



             [place]



             



             

 

 Stage III  Active    Finding  2018    [facility]



 pressure ulcer            [facility]



             [place]



             



             

 

 Ulcer of scrotum  Resolved    Finding  2018    [facility]



             [facility]



             [place]



             



             

 

 Poor social  Active    Finding  2018    [facility]



 situation            [facility]



             [place]



             



             

 

 Pressure ulcer  Active    Finding  2018    [facility]



 of thigh, stage            [facility]



 3            [place]



             



             

 

 Stage III  Resolved    Finding  2018    [facility]



 pressure ulcer            [facility]



 of heel            [place]



             



             

 

 Pressure ulcer,  Inactive    Finding  2018    [facility]



 stage 3            [facility]



             [place]



             



             

 

 Stage II  Resolved    Finding  2018    [facility]



 pressure ulcer            [facility]



 of left ankle            [place]



             



             

 

 Stage III  Active    Finding  2018    [facility]



 pressure ulcer            [facility]



 of left ankle            [place]



             



             

 

 Stage II  Resolved    Finding  2018    [facility]



 pressure ulcer            [facility]



 of right ankle            [place]



             



             

 

 History of  Active    Finding  2018    [facility]



 Clostridium            [facility]



 difficile            [place]



 colitis            



             

 

 Pressure ulcer  Resolved    Finding  2018    [facility]



 of right leg,            [facility]



 stage 2            [place]



             



             

 

 Pressure ulcer  Active    Finding  2018    [facility]



 of right leg,            [facility]



 stage 3            [place]



             



             

 

 Asymptomatic  Active    Finding  2018    [facility]



 bacteriuria            [facility]



             [place]



             



             

 

 Ulcer of toe  Resolved    Finding  2018    [facility]



             [facility]



             [place]



             



             

 

 Incontinence  Active    Finding  2018    [facility]



 without sensory            [facility]



 awareness            [place]



             



             

 

 Unstageable  Resolved    Finding  2018    [facility]



 pressure sore            [facility]



             [place]



             



             

 

 Abdominal pain  Inactive    Finding  2018    [facility]



             [facility]



             [place]



             



             

 

 Cellulitis of  Resolved    Finding  2018    [facility]



 heel, left            [facility]



             [place]



             



             

 

 Clostridium  Resolved    Finding  2018    [facility]



 difficile            [facility]



 colitis            [place]



             



             

 

 Acute pain  Active    Problem  2018    [facility]



             

 

 Asthma  Resolved    Problem  2018    [facility]



             

 

 Bronchitis  Resolved    Problem  2018    [facility]



             

 

 Cerebral palsy  Resolved    Problem  2018    [facility]



             

 

 Headache  Active    Problem  2018    [facility]



             

 

 Hydrocephalus  Resolved    Problem  2018    [facility]



             

 

 Osteomyelitis  Resolved    Problem  2018    [facility] 



             Pearlmary b[facility] -



             Nick,M



             [facility]



             

 

 HEADACHE  Active          [facility]



             







Medications







 Medication  Details  Route  Status  Patient  Ordering  Order  Source



         Instructions  Provider  Date  



               



               



               

 

 Docusate Sodium  100 mg=1 cap,    Active         [place]



 100 MG Oral  PO, BID, 0            [facility]



 Capsule  Refill(s)            [place]



               



               

 

 Zosyn  0 Refill(s)    Active        [facility] [place]



               [facility]



               



               

 

 celecoxib 200  200 mg=1 cap,    Active      [facility] [place]



 mg oral capsule  PO, BID, 0          2018  [facility]



   Refill(s)            [place]



               



               

 

 ascorbic acid  500 mg=1 tab,    Active      [facility] [place]



   PO, BID, 0            [facility]



   Refill(s)            [place]



               



               

 

 acetaminophen  1,000 mg=2 tab,    Active      [facility] [place]



 500 mg oral  PO, Q6Hnow, 0            Medical



 tablet  Refill(s)            [place]



               



               

 

 Oxycodone  5 mg=1 tab, PO,    Active      [facility] [place]



 Hydrochloride 5  Q4H, PRN Pain          2018  [facility]



 MG Oral Tablet  Score 4-6, 0            Center



   Refill(s)            



               



               

 

 zinc sulfate  220 mg=1 cap,    Active      [facility] [place]



 220 mg oral  PO, Daily, 0          2018  [facility]



 capsule  Refill(s)            [place]



               



               

 

 multivitamin  1 tab, PO,    Active      [facility] [place]



   Daily, 0          2018  [facility]



   Refill(s)            [place]



               



               

 

 methocarbamol  1,000 mg=2 tab,    Active      [facility] [place]



 500 mg oral  PO, Q8H, 0          2018  Medical



 tablet  Refill(s)            Center



               



               

 

 LORazepam 0.5  0.5 mg=1 tab,    Active         [place]



 mg oral tablet  PO, Q8H, PRN          2018  [facility]



   Anxiety, 0            Center



   Refill(s)            



               



               

 

 Lidocaine  3 patch, TOP,    Active         [place]



 Hydrochloride  Daily, Remove          2018  [facility]



 0.05 MG/MG  after 12 hours,            Center



 Transdermal  0 Refill(s)            



 Patch              



 [Lidoderm]              



               



               

 

 Robaxin  1,000 mg, 2    No Longer        [facility]



   tab, Route: PO,    Active      2018  [facility]



   Drug form: TAB,            Center



   Q8H, Dosing            



   Weight 127.027,            



   kg, Start date:            



   18            



   16:00:00 CDT,            



   Duration: 30            



   day, Stop date:            



   18            



   8:00:00            



   CDTNotes: (Same            



   as:Robaxin)            



               



               

 

 Oxycodone  10 mg, 2 tab,    No Longer         [place]



 Hydrochloride 5  Route: PO, Drug    Active      2018  [facility]



 MG Oral Tablet  form: TAB,            Center



   Daily, Dosing            



   Weight 127.027,            



   kg, PRN            



   Procedure,            



   Start date:            



   18            



   13:06:00 CDT,            



   Duration: 30            



   day, Stop date:            



   18            



   13:05:00            



   CDTNotes: (Same            



   as: Roxicodone)            



               



               

 

 Ativan  0.5 mg, 1 tab,    No Longer        [facility]



   Route: PO, Drug    Active      2018  [facility]



   form: TAB, Q8H,            Center



   Dosing Weight            



   127.027, kg,            



   PRN Anxiety,            



   Start date:            



   18            



   10:18:00 CDT,            



   Duration: 7            



   day, Stop date:            



   18            



   10:17:00            



   CDTNotes: (Same            



   as: Ativan)            



               



               

 

 Trazodone  50 mg, 1 tab,    No Longer        [facility] [place]



 Hydrochloride  Route: PO, Drug    Active      2018  [facility]



 50 MG Oral  form: TAB,            Center



 Tablet  Bedtime, Dosing            



   Weight 127.027,            



   kg, Start date:            



   18            



   21:00:00 CDT,            



   Duration: 30            



   day, Stop date:            



   18            



   21:00:00            



   CDTNotes: (Same            



   As: Desyrel)            



               



               

 

 remove patch  3 patch, Route:    No Longer         [place]



   TOP, Bedtime,    Active        [facility]



   Drug form:            Center



   ERFILM, Start            



   date: 18            



   21:00:00 CDT,            



   Duration: 30            



   day, Stop date:            



   18            



   21:00:00            



   CDTNotes:            



   Remove patch 12            



   hours after            



   application            



   each day.            



               



               

 

 Oxycodone  10 mg, 2 tab,    Inactive        [facility] [place]



 Hydrochloride 5  Route: PO, Drug          2018  Medical



 MG Oral Tablet  form: TAB,            Center



   ONCE, Dosing            



   Weight 127.027,            



   kg, Start date:            



   18            



   17:01:00 CDT,            



   Stop date:            



   18            



   17:01:00            



   CDTNotes: (Same            



   as: Roxicodone)            



               



               

 

 Celebrex  200 mg, 1 cap,    No Longer        [facility]



   Route: PO, Drug    Active      2018  [facility]



   form: CAP, BID,            [place]



   Dosing Weight            



   127.027, kg,            



   Start date:            



   18            



   17:00:00 CDT,            



   Duration: 30            



   day, Stop date:            



   18            



   9:00:00            



   CDTNotes:            



   NSAID. Please            



   check            



   indication. Not            



   for seizure.            



   (Same As:            



   CeleBREX)            



               



               

 

 Vancomycin  1,500 mg, 250    No Longer         [place]



   mL, Route:    Active      2018  [facility]



   IVPB, Drug            Center



   form: INJ,            



   [id number], Dosing            



   Weight 127.27,            



   kg, Start date:            



   18            



   16:00:00 CDT,            



   Stop date:            



   05/10/18            



   8:00:00 CDT,            



   ABX Indication:            



   Skin/Soft            



   Tissue            



   InfectionNotes:            



   TIME CRITICAL            



   MEDICATION Same            



   as: Vancocin-NS            



   (premixed)            



   Infusion rate            



   2001 mg: infuse            



   over 2.5 hours            



               



               

 

 Lidocaine  3 patch, Route:    No Longer        [facility] [place]



 Hydrochloride  TOP, Daily,    Active        [facility]



 0.05 MG/MG  Drug form:            [place]



 Transdermal  FILM, Start            



 Patch  date: 18            



 [Lidoderm]  9:00:00 CDT,            



   Duration: 7            



   day, Stop date:            



   18            



   9:00:00 CDT,            



   Remove after 12            



   hoursNotes:            



   Apply only once            



   for up to 12            



   hours in a            



   24-hour period            



   (12 hours on            



   and 12 hours            



   off). (Same as:            



   Lidoderm)            



   "Remove old            



   patch before            



   application of            



   new patch"            



               



               

 

 Phenergan  12.5 mg, 0.5    Inactive         [place]



   mL, Route:          2018  [facility]



   IVPB, Drug            Center



   form: INJ,            



   ONCE, Dosing            



   Weight 127.027,            



   kg, Priority:            



   NOW, Start            



   date: 18            



   17:40:00 CDT,            



   Stop date:            



   18            



   17:40:00            



   CDTNotes: Do            



   not give IV            



   push.  (Same            



   as: Phenergan)            



               



               

 

 Dilaudid  0.5 mg, 0.25    Inactive       [place]



   mL, Route: IVP,            Medical



   Drug form: INJ,            Center



   ONCE, Dosing            



   Weight 127.027,            



   kg, Priority:            



   NOW, Start            



   date: 18            



   17:40:00 CDT,            



   Stop date:            



   18            



   17:40:00            



   CDTNotes: Same            



   as Dilaudid            



               



               

 

 Tramadol  100 mg, 2 tab,    No Longer        [facility]



   Route: PO, Drug    Active        [facility]



   form: TAB,            Center



   Q6Hnow, Dosing            



   Weight 127.027,            



   kg, Start date:            



   18            



   17:00:00 CDT,            



   Duration: 30            



   day, Stop date:            



   18            



   11:00:00            



   CDTNotes: Not            



   to exceed            



   400mg/day.            



   (Same As:            



   Ultram)            



               

 

 gabapentin  600 mg, 2 cap,    No Longer        [facility]



   Route: PO, Drug    Active        [facility]



   form: CAP,            Center



   Q8Hnow, Dosing            



   Weight 127.027,            



   kg, Start date:            



   18            



   17:00:00 CDT,            



   Stop date:            



   18            



   9:00:00            



   CDTNotes: (Same            



   as: Neurontin)            



               



               

 

 Acetaminophen  1,000 mg, 2    No Longer        [facility]



   tab, Route: PO,    Active        [facility]



   Drug form: TAB,            Center



   Q6Hnow, Dosing            



   Weight 127.027,            



   kg, Start date:            



   18            



   17:00:00 CDT,            



   Duration: 30            



   day, Stop date:            



   18            



   11:00:00            



   CDTNotes: Max            



   acetaminophen            



   4000 mg/day (4            



   gm/day).  (Same            



   as: Tylenol            



   Extra Strength)            



               



               

 

 Robaxin  500 mg, 1 tab,    No Longer        [facility]



   Route: PO, Drug    Active        [facility]



   form: TAB, TID,            Center



   Dosing Weight            



   127.027, kg,            



   Start date:            



   18            



   17:00:00 CDT,            



   Duration: 30            



   day, Stop date:            



   18            



   13:00:00            



   CDTNotes: (Same            



   as:Robaxin)            



               



               

 

 Oxycodone  5 mg, 1 tab,    No Longer        [facility] [place]



 Hydrochloride 5  Route: PO, Drug    Active      2018  Medical



 MG Oral Tablet  form: TAB, Q4H,            Center



   Dosing Weight            



   127.027, kg,            



   PRN Pain Score            



   4-6, Start            



   date: 18            



   16:33:00 CDT,            



   Duration: 30            



   day, Stop date:            



   18            



   16:32:00            



   CDTNotes: (Same            



   as: Roxicodone)            



               



               

 

 Beneprotein 7  2 pkt, Route:    No Longer         [place]



 gm pkt  PO, Drug Form:    Active      2018  Medical



   PWDR, Dosing            Center



   Weight 127.027,            



   kg, BID-Before            



   Meals, Start            



   date: 18            



   16:30:00 CDT,            



   Duration: 30            



   day, Stop date:            



   18            



   7:30:00            



   CDTNotes: (Same            



   as:            



   Beneprotein)            



               



               

 

 Acetaminophen  1 tab, PO, TID,    No Longer         [place]



 325 MG /  0 Refill(s)    Active      2018  Medical



 Oxycodone              Center



 Hydrochloride              



 10 MG Oral              



 Tablet              



 [Percocet              



 10/325]              



               

 

 Dilaudid  0.5 mg, 0.25    Inactive       [place]



   mL, Route: IVP,          2018  Medical



   Drug form: INJ,            Center



   ONCE, Start            



   date: 18            



   11:01:00 CDT,            



   Stop date:            



   18            



   11:01:00 CDT            



               



               

 

 Phenergan  25 mg, 1 tab,    Inactive        [facility]



   Route: PO, Drug          2018  Medical



   form: TAB, Q6H,            Center



   Dosing Weight            



   127.027, kg,            



   PRN Nausea &            



   Vomiting, Start            



   date: 18            



   10:43:00 CDT,            



   Duration: 30            



   day, Stop date:            



   18            



   10:42:00            



   CDTNotes: (Same            



   as: Phenergan)            



               



               

 

 Dilaudid  2 mg, Route:    Inactive         [place]



   IVP, ONCE,          2018  Medical



   Dosing Weight            Center



   127.027, kg,            



   Priority: STAT,            



   Start date:            



   18            



   10:43:00 CDT,            



   Stop date:            



   18            



   10:43:00 CDT            



               



               

 

 Docusate  100 mg, 1 cap,    No Longer        [facility]



   Route: PO, Drug    Active      2018  Medical



   form: CAP, BID,            Center



   Dosing Weight            



   127.27, kg,            



   Start date:            



   18            



   9:00:00 CDT,            



   Duration: 30            



   day, Stop date:            



   18            



   17:00:00            



   CDTNotes: (Same            



   as: Colace) (Do            



   Not Crush)            



               



               

 

 Zinc Sulfate  220 mg, 1 cap,    No Longer       [place]



   Route: PO, Drug    Active      2018  Medical



   form: CAP,            Center



   Daily, Dosing            



   Weight 127.27,            



   kg, Start date:            



   18            



   9:00:00 CDT,            



   Duration: 30            



   day, Stop date:            



   18            



   9:00:00            



   CDTNotes: (Zinc            



   sulfate            



   capsule) - 220            



   mg Zinc            



   sulfate=50 mg            



   elemental zinc            



   Same as Zinc            



   Sulfate            



               

 

 ascorbic acid  500 mg, 1 tab,    No Longer       [place]



   Route: PO, Drug    Active        Medical



   form: TAB, BID,            Center



   Dosing Weight            



   127.27, kg,            



   Start date:            



   18            



   9:00:00 CDT,            



   Duration: 30            



   day, Stop date:            



   18            



   17:00:00            



   CDTNotes: (Same            



   as: Vitamin C)            



               



               

 

 multivitamin  1 tab, Route:    No Longer       [place]



   PO, Drug Form:    Active      2018  Medical



   TAB, Dosing            Center



   Weight 127.27,            



   kg, Daily,            



   Start date:            



   18            



   9:00:00 CDT,            



   Duration: 30            



   day, Stop date:            



   18            



   9:00:00            



   CDTNotes: (Same            



   as:Thera)            



   WASTE: F/P -            



   Black; E -            



   Municipal Trash            



   Bin  Take with            



   food.            



               

 

 Naproxen  500 mg, 1 tab,    Inactive       [place]



   Route: PO, Drug            Medical



   form: TAB,            Center



   [id number], Dosing            



   Weight 127.27,            



   kg, Start date:            



   18            



   2:00:00 CDT,            



   Duration: 30            



   day, Stop date:            



   18            



   14:00:00            



   CDTNotes: (Same            



   as: Naprosyn)            



   Take with food.            



               



               

 

 Zosyn  3.375 gm,    No Longer        [facility]



   Route: IVPB,    Active      2018  Medical



   Drug form:            Center



   PDR/INJ,            



   ABXQ8H, Dosing            



   Weight 127.27,            



   kg, Start date:            



   18            



   2:00:00 CDT,            



   Stop date:            



   05/10/18            



   10:00:00 CDT,            



   ABX Indication:            



   Skin/Soft            



   Tissue            



   InfectionNotes:            



   (Same as:            



   Zosyn) Dosing            



   based on            



   Piperacillin            



   component   ***            



   MEDICATION            



   WASTE ***            



   Product Size:            



   3375 mg Product            



   Wasted:  ___ mg            



               



               

 

 Vancomycin  1,000 mg,    No Longer         [place]



   Route: IVPB,    Active        Medical



   Drug form: INJ,            Center



   ABXQ8H, Dosing            



   Weight 127.27,            



   kg, Start date:            



   18            



   2:00:00 CDT,            



   Duration: 7            



   day, Stop date:            



   05/10/18            



   18:00:00 CDT,            



   ABX Indication:            



   Skin/Soft            



   Tissue            



   InfectionNotes:            



   TIME CRITICAL            



   MEDICATION            



   (Same As:            



   Vancocin)            



   Infusion rate            



   2001 mg: infuse            



   over 2.5 hours            



   For adult            



   patients only:            



   Round to            



   nearest 250 mg            



   per Medical            



   Staff approval            



    *** MEDICATION            



   WASTE ***            



   Product Size:            



   1000 mg Product            



   Wasted:  ___ mg            



               



               

 

 Enoxaparin  40 mg, 0.4 mL,    No Longer         [place]



   Route: SUB-Q,    Active        Medical



   Drug form: INJ,            Center



   [id number],            



   Dosing Weight            



   127.27, kg,            



   Consider for            



   obese patients,            



   Start date:            



   18            



   2:00:00 CDT,            



   Stop date:            



   18            



   14:00:00            



   CDTNotes: (Same            



   as: Lovenox)            



               



               

 

 Sodium Chloride  1,000 mL, Rate:    No Longer       [place]



 0.9% IV 1,000  125 ml/hr,    Active        Medical



 mL  Infuse over: 8            Center



   hr, Route: IV,            



   Dosing Weight            



   127.27 kg,            



   Total Volume:            



   1,000, Start            



   date: 18            



   1:46:00 CDT,            



   Duration: 30            



   day, Stop date:            



   18            



   1:45:00 CDT,            



   2.44, m2            



               

 

 Saline Flush  10 ml, Route:    No Longer         [place]



 0.9%  IVP, Drug Form:    Active        Medical



   INJ, Dosing            Center



   Weight 127.27,            



   kg, PRN, PRN            



   Line Flush,            



   Start date:            



   18            



   1:46:00 CDT,            



   Duration: 30            



   day, Stop date:            



   18            



   1:45:00            



   CDTNotes: (Same            



   as: BD            



   Posiflush)            



               



               

 

 Acetaminophen  325 mg, 1 tab,    Inactive         [place]



   Route: PO, Drug          2018  Medical



   form: TAB, Q4H,            Center



   Dosing Weight            



   127.27, kg, PRN            



   Pain Score 4-6,            



   Start date:            



   18            



   1:46:00 CDT,            



   Duration: 30            



   day, Stop date:            



   18            



   1:45:00            



   CDTNotes: Do            



   not exceed 4            



   gm/day.  (Same            



   as: Tylenol)            



               



               

 

 Acetaminophen  1 tab, Route:    Inactive         [place]



 325 MG /  PO, Drug Form:          2018  Medical



 Hydrocodone  TAB, Dosing            Center



 Bitartrate 5 MG  Weight 127.27,            



 Oral Tablet  kg, Q4H, PRN            



   Pain Score 4-6,            



   Start date:            



   18            



   1:46:00 CDT,            



   Duration: 30            



   day, Stop date:            



   18            



   1:45:00            



   CDTNotes: (Same            



   as: Norco            



   325/5)  Do not            



   exceed 4gm/day            



   of            



   acetaminophen.            



               



               

 

 Reglan  10 mg, 2 mL,    Inactive         [place]



   Route: IVP,            Medical



   Drug form: INJ,            Center



   ONCE, Dosing            



   Weight 127.273,            



   kg, Priority:            



   STAT, Start            



   date: 18            



   23:27:00 CDT,            



   Stop date:            



   18            



   23:27:00            



   CDTNotes: (Same            



   as: Reglan)            



               



               

 

 Benadryl  25 mg, 0.5 mL,    Inactive         [place]



   Route: IVP,          2018  Medical



   Drug form: INJ,            Center



   ONCE, Dosing            



   Weight 127.273,            



   kg, Priority:            



   STAT, Start            



   date: 18            



   23:27:00 CDT,            



   Stop date:            



   18            



   23:27:00            



   CDTNotes: (Same            



   as: Benadryl)            



               



               

 

 Magnesium  2 gm, 50 mL,    Inactive         [place]



 Sulfate  Route: IV, Drug            Medical



   form: INJ,            Center



   ONCE, Dosing            



   Weight 127.273,            



   kg, Priority:            



   STAT, Start            



   date: 18            



   23:26:00 CDT,            



   Stop date:            



   18            



   23:26:00            



   CDTNotes:            



   WASTE: F/P -            



   Sink; E -            



   Municipal Trash            



   Bin            



               

 

 Sodium Chloride  1,000 mL, 1000    Inactive        [facility] [place]



 0.9% (Bolus) IV  ml/hr, Infuse          2018  Medical



   Over: 1 hr,            Center



   Route: IV,            



   1,000, Drug            



   form: INJ,            



   ONCE, Priority:            



   STAT, Dosing            



   Weight 127.273            



   kg, Start date:            



   18            



   23:26:00 CDT,            



   Stop date:            



   18            



   23:26:00 CDT            



               



               

 

 Zosyn  4.5 gm, Route:    Inactive         [place]



   IVPB, ONCE,          2018  Medical



   Dosing Weight            Center



   127.273, kg,            



   Priority: STAT,            



   Start date:            



   18            



   23:10:00 CDT,            



   Stop date:            



   18            



   23:10:00 CDT,            



   ABX Indication:            



   Bacteremia            



               



               

 

 Vancomycin  2,000 mg,    Inactive         [place]



   Route: IVPB,          2018  Medical



   ONCE, Dosing            Center



   Weight 127.273,            



   kg, Priority:            



   STAT, Start            



   date: 18            



   23:10:00 CDT,            



   Stop date:            



   18            



   23:10:00 CDT,            



   ABX Indication:            



   BacteremiaNotes            



   : TIME CRITICAL            



   MEDICATION            



   (Same As:            



   Vancocin)            



   Infusion rate            



   2001 mg: infuse            



   over 2.5 hours            



   For adult            



   patients only:            



   Round to            



   nearest 250 mg            



   per Medical            



   Staff approval            



    *** MEDICATION            



   WASTE ***            



   Product Size:            



   1000 mg Product            



   Wasted:  ___ mg            



               



               

 

 normal saline  1,000 mL, Rate:    No Longer       [place]



 0.9% IV 1,000  75 ml/hr,    Active      2018  Medical



 mL  Infuse over:            Center



   13.3 hr, Route:            



   IV, Dosing            



   Weight 127.273            



   kg, Total            



   Volume: 1,000,            



   Start date:            



   18            



   22:39:00 CDT,            



   Duration: 30            



   day, Stop date:            



   18            



   22:38:00 CDT,            



   2.36, m2            



               

 

 Acetaminophen  1,000 mg, 2    Inactive         [place]



   tab, Route: PO,          2018  Medical



   Drug form: TAB,            Center



   ONCE, Dosing            



   Weight 127.273,            



   kg, Start date:            



   18            



   21:56:00 CDT,            



   Stop date:            



   18            



   21:56:00            



   CDTNotes: Max            



   acetaminophen            



   4000 mg/day (4            



   gm/day).  (Same            



   as: Tylenol            



   Extra Strength)            



               



               

 

 Rocephin  1 gm, Route:    Inactive      05/04[facility]



   IVP, Drug form:          2018  Medical



   PDR/INJ, ONCE,            Center



   Dosing Weight            



   127.273, kg,            



   Priority: STAT,            



   Start date:            



   18            



   21:21:00 CDT,            



   Stop date:            



   18            



   21:21:00 CDT,            



   ABX Indication:            



   Urinary Tract            



   InfectionNotes:            



   (Same As:            



   Rocephin).            



   *** MEDICATION            



   WASTE ***            



   Product Size:            



   1000 mg Product            



   Wasted:  _0__            



   mg            



               

 

 Morphine  4 mg, Route:    Inactive        [facility]



   IVP, ONCE,          2018  Medical



   Dosing Weight            Center



   127.273, kg,            



   Priority: STAT,            



   Start date:            



   18            



   19:05:00 CDT,            



   Stop date:            



   18            



   19:05:00 CDT            



               



               

 

 Benadryl  25 mg, 0.5 mL,    Inactive        [facility]



   Route: IVP,            Medical



   Drug form: INJ,            Center



   ONCE, Dosing            



   Weight 127.273,            



   kg, Priority:            



   STAT, Start            



   date: 18            



   18:14:00 CDT,            



   Stop date:            



   18            



   18:14:00            



   CDTNotes: (Same            



   as: Benadryl)            



               



               

 

 Benadryl  50 mg, 2 cap,    Inactive      [facility]



   Route: PO, Drug            Medical



   form: CAP,            Center



   ONCE, Dosing            



   Weight 127.273,            



   kg, Priority:            



   STAT, Start            



   date: 18            



   17:56:00 CDT,            



   Stop date:            



   18            



   17:56:00            



   CDTNotes: (Same            



   as: Benadryl)            



               



               

 

 Morphine  4 mg, 1 mL,    Inactive      [facility]



   Route: IVP,            Medical



   Drug form:            Center



   SOLN, ONCE,            



   Dosing Weight            



   127.273, kg,            



   Priority: STAT,            



   Start date:            



   18            



   17:56:00 CDT,            



   Stop date:            



   18            



   17:56:00 CDT            



               



               

 

 Pantoprazole  DAILY AT 0630    Active    [facility]



             2016  [facility]



               [place]



               



               

 

 Metronidazole  Q8H    Active    [facility]



             2016  [facility]



               [place]



               



               

 

 Codeine/Apap  EVERY 6 HOURS    Active    [facility]



   AS NEEDED PRN            [facility]



   For [facility]            [place]



               



               

 

 Oxycodone  FOUR TIMES    Active        [facility]



 Hcl/Acetaminoph  DAILY          2016  [facility]



 [facility]              [place]



               



               

 

 Meropenem  Q8H    Active        [facility]



               [facility]



               [place]



               



               

 

 Collagenase  DAILY    Active    Granda    [facility]



               [facility]



               [place]



               



               

 

 Ciprofloxacin  Q12H    Active    Todd  09/10/  [facility]



 400mg Iv              [facility]



               [place]



               



               

 

 Amikacin Sulf  DAILY    Active    Todd  09/10/  [facility]



               [facility]



               [place]



               



               

 

 Linezolid  TWICE DAILY    Inactive    Granda    [facility]



               [facility]



               [place]



               



               







Allergies, Adverse Reactions, Alerts







 Substance  Category  Reaction  Severity  Reaction  Status  Date  Comments  
Source



         type    Reported    



                 



                 



                 

 

 levofloxacin    Hives  Moderate  Allergy to  Active      [facility]



         Substance    8    [facility]



                 [place]



                 [facility]



                 



                 

 

 sulfamethoxa    Hives  Moderate  Allergy to  Active      [facility]



 zole        Substance    8    [facility]



                 [place]



                 [facility]



                 



                 

 

 ketorolac    Nausea/Vo    Allergy to  Active      [facility]



 tromethamine    miting    Substance    8    [facility]



                 [facility]



                 



                 

 

 vancomycin    Hives    Allergy to  Active      [facility]



         Substance    8    [facility]



                 [place]



                 [facility]



                 



                 

 

 ondansetron    Nausea/Vo  Mild  Propensity  Active      [facility]



     miting    to adverse    8    [facility]



         [facility]        [place]



                 [facility]



                 



                 

 

 ciprofloxaci    Nausea/Vo    Propensity  Active      [facility]



 n    miting    to adverse    8    [facility]



         [facility]        [place]



                 [facility]



                 



                 

 

 doxycycline    Nausea/Vo    Propensity  Active      [facility]



     miting    to adverse    8    [facility]



         [facility]        [place]



                 [facility]



                 



                 

 

 morphine  Assertion      Drug  Active      [facility]



                 



                 

 

 amoxicillin  Assertion      Drug  Active      [facility]



                 



                 

 

 Toradol  Assertion      Drug  Active      [facility]



                 



                 

 

 Minocin  Assertion      Drug  Active      [facility]



                 



                 

 

 Zofran  Assertion      Drug  Active      [facility]



                 



                 

 

 Levaquin  Assertion      Drug  Active      [facility]



                 



                 

 

 Bactrim  Assertion      Drug  Active      [facility]



                 



                 







Immunizations







 Immunization  Date Given  Site  Status  Last Updated  Comments  Source



             



             







Results







 Order Name  Results  Value  Reference  Date  Interpretation  Comments  Source



       [facility]        



               



               



               

 

 Brain-Outs  Brain-Outsid  EXAM: CT BRAIN WITHOUT CONTRAST -- OUTSIDE CONSULT  
      -  [facility]



 jonathan  e Consult     -  Medical



 Consult CT            This report was dictated by a Radiology Resident/Fellow.
  I have personally reviewed the images as  Center



             well as the Resident's interpretation and agree with the findings.
  



     DATE: 2018 4:49 AM CST        Read by:  Mauricio Cortes MD  
        Resident:  Mauricio Cortes MD  



             Dictated Date/time:  18 04:58  



             Electronically Signed by:  Radha Addison MD              
   18 07:46  



             FINAL REPORT  



     INDICATION: " - PAIN"          



               



               



               



     COMPARISON: Noncontrast head CTs 2018, 2015, 2013       
   



               



               



               



     TECHNIQUE: Noncontrast outside hospital CT submitted for 2nd 
interpretation. 205 images. Imaging was performed at [facility] on 2018.          



               



     IV contrast: None.          



               



               



               



     FINDINGS:          



               



               



               



     Overall unchanged exam. Right transfrontal ventriculostomy catheter is 
unchanged in position. 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 
size and configuration. The abandoned right transparie          



     hudson catheter is unchanged. Stigmata of Chiari II malformation.          



               



               



               



     There is no intracranial hemorrhage or extra-axial collection.          



               



               



               



     No new parenchymal density abnormality. No mass or mass effect. Unchanged 
from the tonsillar herniation.          



               



               



               



     The density of the larger intracranial sinuses is normal.          



               



               



               



               



               



     IMPRESSION: Unchanged examination compared to 2018          



               



               



               



     The final report agrees with the preliminary report by the radiology 
resident.          



               



               



               



               

 

 CHEM PANEL  B/C Ratio  17  6 - 25        [address]



               



               



               

 

 CHEM PANEL  Globulin  4.3 g/dL  2.7 - 4.2        [address]



               



               



               

 

 CHEM PANEL  A/G Ratio  0.7  0.7 - 1.6        [address]



               



               



               

 

 CHEM PANEL  AGAP  14.4 meq/L  10.0 -        [facility]



       20.0        [facility]



               



               



               

 

 CHEM PANEL  eGFR  113        Result Comment: The eGFR is calculated using 
the CKD-EPI formula. In most young, healthy individuals the eGFR will be >90 mL/
min/1.73m2. The eGFR declines with age. An eGFR of 60-89 may be normal in  [facility] 
[place]



     mL/min/1.7        some populations, particularly the elderly, for 
whom the CKD-EPI formula has not been extensively validated. Use of the eGFR is 
not recommended in the following populations:  [address]



             Individuals with unstable creatinine concentrations, including 
pregnant patients and those with serious co-morbid conditions.  



               



             Patients with extremes in muscle mass or diet.  



               



             The data above are obtained from the National Kidney Disease 
Education Program (NKDEP) which additionally recommends that when the eGFR is 
used in patients with extremes of body mass index for purposes  



             of drug dosing, the eGFR should be multiplied by the estimated 
BMI.  

 

 CHEM PANEL  Alk Phos  76 unit/L  39 - 136  05/      [address]



               



               



               

 

 CHEM PANEL  ALT  35 unit/L  0 - 65  /      [address]



               



               



               

 

 CHEM PANEL  Albumin Lvl  2.8 g/dL  3.5 - 5.0  /      [address]



               



               



               

 

 CHEM PANEL  Total  7.1 g/dL  6.4 - 8.4        [facility]



   Protein      [address]



               



               



               

 

 CHEM PANEL  Calcium Lvl  8.7 mg/dL  8.5 - 10.5        [address]



               



               



               

 

 CHEM PANEL  AST  18 unit/L  0 - 37  /      [address]



               



               



               

 

 CHEM PANEL  Bili Total  0.3 mg/dL  0.2 - 1.3        [address]



               



               



               

 

 CHEM PANEL  Potassium  4.4 meq/L  3.5 - 5.1        [facility]      [address] [place] [address]



               



               



               

 

 CHEM PANEL  Chloride Lvl  109 meq/L  95 - 109        [address]



               



               



               

 

 CHEM PANEL  CO2  23 meq/L  24 - 32  /      [address]



               



               



               

 

 CHEM PANEL  Glucose Lvl  114 mg/dL  70 - 99        [address]



               



               



               

 

 CHEM PANEL  Creatinine  1.01 mg/dL  0.50 -        [facility]    1.40  /[address] [place] [address]



               



               



               

 

 CHEM PANEL  BUN  17 mg/dL  7 - 22        [address]



               



               



               

 

 CHEM PANEL  Sodium Lvl  142 meq/L  135 - 145        [address]



               



               



               

 

 HEMATOLOGY  Basophils  0.6 %  0.0 - 1.0  /      [address]



               



               



               

 

 HEMATOLOGY  Segs-Bands #  4.8 K/CMM  1.5 - 8.1        [address]



               



               



               

 

 HEMATOLOGY  Monocytes #  0.7 K/CMM  0.0 - 0.8  /      [address]



               



               



               

 

 HEMATOLOGY  Lymphocytes  1.9 K/CMM  1.0 - 5.5  /      [address]



               



               



               

 

 HEMATOLOGY  Monocytes  9.4 %  2.0 - 12.0  /      [address]



               



               



               

 

 HEMATOLOGY  Eosinophils  0.2 K/CMM  0.0 - 0.5        [facility]



   #      /      [facility]



               



               



               

 

 HEMATOLOGY  Eosinophils  2.9 %  0.0 - 4.0         [place]



         /2018      [facility]



               



               



               

 

 HEMATOLOGY  Segs  62.2 %  45.0 -         [place]



       75.0        [facility]



               



               



               

 

 HEMATOLOGY  Lymphocytes  24.9 %  20.0 -         [place]



       40.0        [facility]



               



               



               

 

 HEMATOLOGY  MCH  27.5 pg  27.0 -         [place]



       31.0        [facility]



               



               



               

 

 HEMATOLOGY  MCV  85.3 fL  80.0 -         [place]



       94.0        [facility]



               



               



               

 

 HEMATOLOGY  Hct  43.9 %  42.0 -         [place]



       54.0        [facility]



               



               



               

 

 HEMATOLOGY  Hgb  14.2 g/dL  14.0 -         [place]



       18.0        [facility]



               



               



               

 

 HEMATOLOGY  WBC  7.7 K/CMM  3.7 - 10.4         [place]



         /2018      [facility]



               



               



               

 

 HEMATOLOGY  RBC  5.15 M/CMM  4.70 -         [place]



       6.10        [facility]



               



               



               

 

 HEMATOLOGY  MPV  8.4 fL  7.4 - 10.4         [place]



         /2018      [facility]



               



               



               

 

 HEMATOLOGY  MCHC  32.3 g/dL  32.0 -         [place]



       36.0        [facility]



               



               



               

 

 HEMATOLOGY  RDW  17.3 %  11.5 -         [place]



       14.5        [facility]



               



               



               

 

 HEMATOLOGY  Platelet  317 K/CMM  133 - 450         [place]



         [address]



               



               



               

 

 CHEM PANEL  Globulin  4.4 g/dL  2.7 - 4.2         [place]



               [facility]



               



               



               

 

 CHEM PANEL  A/G Ratio  0.6  0.7 - 1.6         [place]



         /2018      [facility]



               



               



               

 

 CHEM PANEL  B/C Ratio  17  6 - 25         [place]



         /2018      [facility]



               



               



               

 

 CHEM PANEL  AGAP  11.3 meq/L  10.0 -         [place]



       20.0        [facility]



               



               



               

 

 CHEM PANEL  eGFR  134        Result Comment: The eGFR is calculated using 
the CKD-EPI formula. In most young, healthy individuals the eGFR will be >90 mL/
min/1.73m2. The eGFR declines with age. An eGFR of 60-89 may be normal in  [facility] 
[place]



     mL/min/1.7        some populations, particularly the elderly, for 
whom the CKD-EPI formula has not been extensively validated. Use of the eGFR is 
not recommended in the following populations:  [address]



             Individuals with unstable creatinine concentrations, including 
pregnant patients and those with serious co-morbid conditions.  



               



             Patients with extremes in muscle mass or diet.  



               



             The data above are obtained from the National Kidney Disease 
Education Program (NKDEP) which additionally recommends that when the eGFR is 
used in patients with extremes of body mass index for purposes  



             of drug dosing, the eGFR should be multiplied by the estimated 
BMI.  

 

 CHEM PANEL  Creatinine  0.84 mg/dL  0.50 -        [facility]



   Lvl    1.40        [facility]



               



               



               

 

 CHEM PANEL  Sodium Lvl  142 meq/L  135 - 145        [address]



               



               



               

 

 CHEM PANEL  Glucose Lvl  99 mg/dL  70 - 99        [address]



               



               



               

 

 CHEM PANEL  BUN  14 mg/dL  7 - 22        [address]



               



               



               

 

 CHEM PANEL  Alk Phos  79 unit/L  39 - 136        [address]



               



               



               

 

 CHEM PANEL  Bili Total  0.3 mg/dL  0.2 - 1.3        [address]



               



               



               

 

 CHEM PANEL  AST  14 unit/L  0 - 37        [address]



               



               



               

 

 CHEM PANEL  ALT  43 unit/L  0 - 65        [address]



               



               



               

 

 CHEM PANEL  Total  7.1 g/dL  6.4 - 8.4        [facility]



   Protein      [address]



               



               



               

 

 CHEM PANEL  Albumin Lvl  2.7 g/dL  3.5 - 5.0        [address]



               



               



               

 

 CHEM PANEL  Calcium Lvl  9.2 mg/dL  8.5 - 10.5        [address]



               



               



               

 

 CHEM PANEL  CO2  21 meq/L  24 - 32        [address]



               



               



               

 

 CHEM PANEL  Potassium  4.3 meq/L  3.5 - 5.1        [facility]



   Lvl      [address] [place] [address]



               



               



               

 

 CHEM PANEL  Chloride Lvl  114 meq/L  95 - 109        [address]



               



               



               

 

 HEMATOLOGY  MCHC  32.5 g/dL  32.0 -        [facility]



       36.0        [facility]



               



               



               

 

 HEMATOLOGY  RDW  17.5 %  11.5 -        [facility]



       14.5        [facility]



               



               



               

 

 HEMATOLOGY  Platelet  400 K/CMM  133 - 450        [address]



               



               



               

 

 HEMATOLOGY  MPV  8.5 fL  7.4 - 10.4        [facility] [place]



         /[address] [place] [address]



               



               



               

 

 HEMATOLOGY  WBC  6.6 K/CMM  3.7 - 10.4         [place]



         /[address] [place] [address]



               



               



               

 

 HEMATOLOGY  RBC  5.17 M/CMM  4.70 -        [facility]



       6.10        [facility]



               



               



               

 

 HEMATOLOGY  MCV  86.1 fL  80.0 -        [facility]



       94.0        [facility]



               



               



               

 

 HEMATOLOGY  Hct  44.5 %  42.0 -        [facility]



       54.0        [facility]



               



               



               

 

 HEMATOLOGY  MCH  28.0 pg  27.0 -        [facility]



       31.0        [facility]



               



               



               

 

 HEMATOLOGY  Hgb  14.5 g/dL  14.0 -        [facility]



       18.0        [facility]



               



               



               

 

 HEMATOLOGY  Lymphocytes  1.7 K/CMM  1.0 - 5.5        [facility]            [facility]



               



               



               

 

 HEMATOLOGY  Monocytes #  0.7 K/CMM  0.0 - 0.8        [address]



               



               



               

 

 HEMATOLOGY  Eosinophils  0.2 K/CMM  0.0 - 0.5        [facility]            [facility]



               



               



               

 

 HEMATOLOGY  Lymphocytes  26.1 %  20.0 -        [facility]



       40.0        [facility]



               



               



               

 

 HEMATOLOGY  Segs  59.3 %  45.0 -        [facility]



       75.0        [facility]



               



               



               

 

 HEMATOLOGY  Basophils  0.8 %  0.0 - 1.0        [address]



               



               



               

 

 HEMATOLOGY  Monocytes  10.5 %  2.0 - 12.0        [address]



               



               



               

 

 HEMATOLOGY  Eosinophils  3.3 %  0.0 - 4.0        [address]



               



               



               

 

 HEMATOLOGY  Segs-Bands #  3.9 K/CMM  1.5 - 8.1        [address]



               



               



               

 

 TOXICOLOGY  Vanco Tr TND  15:30pm          [address]



               



               



               

 

 TOXICOLOGY  Vanco Tr  9.1 ug/ml          [address]



               



               



               

 

 CHEM PANEL  Glucose Lvl  74 mg/dL  70 - 99        [address]



               



               



               

 

 CHEM PANEL  BUN  12 mg/dL  7 - 22        [address]



               



               



               

 

 CHEM PANEL  Creatinine  0.75 mg/dL  0.50 -        [facility]



   Lvl    1.40        [facility]



               



               



               

 

 CHEM PANEL  eGFR  140        Result Comment: The eGFR is calculated using 
the CKD-EPI formula. In most young, healthy individuals the eGFR will be >90 mL/
min/1.73m2. The eGFR declines with age. An eGFR of 60-89 may be normal in  [facility] 
[place]



     mL/min/1.    some populations, particularly the elderly, for 
whom the CKD-EPI formula has not been extensively validated. Use of the eGFR is 
not recommended in the following populations:  [address]



             Individuals with unstable creatinine concentrations, including 
pregnant patients and those with serious co-morbid conditions.  



               



             Patients with extremes in muscle mass or diet.  



               



             The data above are obtained from the National Kidney Disease 
Education Program (NKDEP) which additionally recommends that when the eGFR is 
used in patients with extremes of body mass index for purposes  



             of drug dosing, the eGFR should be multiplied by the estimated 
BMI.  

 

 CHEM PANEL  Albumin Lvl  2.9 g/dL  3.5 - 5.0        [address]



               



               



               

 

 CHEM PANEL  Globulin  4.4 g/dL  2.7 - 4.2        [address]



               



               



               

 

 CHEM PANEL  A/G Ratio  0.7  0.7 - 1.6        [address]



               



               



               

 

 CHEM PANEL  Bili Total  0.4 mg/dL  0.2 - 1.3        [address]



               



               



               

 

 CHEM PANEL  Alk Phos  71 unit/L  39 - 136        [address]



               



               



               

 

 CHEM PANEL  AST  15 unit/L  0 - 37        [address]



               



               



               

 

 CHEM PANEL  ALT  28 unit/L  0 - 65        [address]



               



               



               

 

 CHEM PANEL  Potassium  4.4 meq/L  3.5 - 5.1        [facility]      [address] [place] [address]



               



               



               

 

 CHEM PANEL  Sodium Lvl  147 meq/L  135 - 145        [address]



               



               



               

 

 CHEM PANEL  CO2  25 meq/L  24 - 32        [address]



               



               



               

 

 CHEM PANEL  Chloride Lvl  114 meq/L  95 - 109  /      [address]



               



               



               

 

 CHEM PANEL  B/C Ratio  16  6 - 25        [address]



               



               



               

 

 CHEM PANEL  Calcium Lvl  8.7 mg/dL  8.5 - 10.5        [address]



               



               



               

 

 CHEM PANEL  AGAP  12.4 meq/L  10.0 -  05/      [facility]



       20.0        [facility]



               



               



               

 

 CHEM PANEL  Total  7.3 g/dL  6.4 - 8.4        [facility] [place]



   Protein      [address]



               



               



               

 

 HEMATOLOGY  Platelet  345 K/CMM  133 - 450  05       [place]



         /2018      [facility]



               



               



               

 

 HEMATOLOGY  MPV  8.7 fL  7.4 - 10.4         [place]



         /2018      [facility]



               



               



               

 

 HEMATOLOGY  WBC  6.9 K/CMM  3.7 - 10.4         [place]



         /2018      [facility]



               



               



               

 

 HEMATOLOGY  RBC  5.30 M/CMM  4.70 -         [place]



       6.10        [facility]



               



               



               

 

 HEMATOLOGY  MCHC  32.8 g/dL  32.0 -         [place]



       36.0        [facility]



               



               



               

 

 HEMATOLOGY  MCH  27.9 pg  27.0 -         [place]



       31.0        [facility]



               



               



               

 

 HEMATOLOGY  Hgb  14.8 g/dL  14.0 -         [place]



       18.0        [facility]



               



               



               

 

 HEMATOLOGY  MCV  85.0 fL  80.0 -        [facility]



       94.0        [facility]



               



               



               

 

 HEMATOLOGY  Hct  45.1 %  42.0 -         [place]



       54.0        [facility]



               



               



               

 

 HEMATOLOGY  RDW  17.5 %  11.5 -         [place]



       14.5        [facility]



               



               



               

 

 HEMATOLOGY  Eosinophils  0.2 K/CMM  0.0 - 0.5        [facility]



   #            [facility]



               



               



               

 

 HEMATOLOGY  Lymphocytes  2.0 K/CMM  1.0 - 5.5        [facility]



         [facility]



               



               



               

 

 HEMATOLOGY  Monocytes #  0.7 K/CMM  0.0 - 0.8  05       [place]



         /2018      [facility]



               



               



               

 

 HEMATOLOGY  Eosinophils  2.4 %  0.0 - 4.0         [place]



         /2018      [facility]



               



               



               

 

 HEMATOLOGY  Basophils  0.6 %  0.0 - 1.0         [place]



         /[address] [place] [address]



               



               



               

 

 HEMATOLOGY  Segs-Bands #  4.0 K/CMM  1.5 - 8.1         [place]



         /2018      [facility]



               



               



               

 

 HEMATOLOGY  Monocytes  10.4 %  2.0 - 12.0         [place]



         /2018      [facility]



               



               



               

 

 HEMATOLOGY  RBC Morph  Normal           [place]



         /2018      [facility]



     (18 2:07 AM)          [place]



               



               



               

 

 HEMATOLOGY  Segs  58.1 %  45.0 -         [place]



       75.0        [facility]



               



               



               

 

 HEMATOLOGY  Plt Morph  Normal           [place]



         /2018      [facility]



     (18 2:07 AM)          [place]



               



               



               

 

 HEMATOLOGY  Lymphocytes  28.5 %  20.0 -         [place]



       40.0        [facility]



               



               



               

 

 HEMATOLOGY  Basophils #  0.1 K/CMM  0.0 - 0.2        [facility]



               [facility]



               



               



               

 

 HEMATOLOGY  Polychrom  Moderate  None Seen        [facility]



               Medical



     *ABN*          [place]



               



     (18 11:07 AM)          



               

 

 TOXICOLOGY  Vanco Tr TND  *          [address]



               



               



               

 

 TOXICOLOGY  Vanco Tr  22.3 ug/ml          [address]



               



               



               

 

 CHEM PANEL  Magnesium  2.3 mg/dL  1.8 - 2.4        [facility]            [facility]



               



               



               

 

 CHEM PANEL  Phosphorus  3.5 mg/dL  2.5 - 4.5        [address]



               



               



               

 

 HEMATOLOGY  PT  14.0 s  12.0 -        [facility]



       14.      [facility]



               



               



               

 

 HEMATOLOGY  PTT  37.6 s  22.9 -        [facility]



       35.      [facility]



               



               



               

 

 HEMATOLOGY  INR  1.08  0.85 -        [facility]



       1.      [facility]



               



               



               

 

 IMMUNOLOGY  C-REACTIVE  13.1 mg/L  <=2.9 mg/L        [facility]



   PROTEIN            [facility]



               



               



               

 

 IMMUNOLOGY  Prealbumin  25.2 mg/dL  18.0 -        [facility]



       45.0        [facility]



               



               



               

 

 CHEM PANEL  Lactic Acid  0.9 mMol/L  0.5 - 2.2        [facility]            [facility]



               



               



               

 

 HEMATOLOGY  Sed Rate  5 mm/h  0 - 15        [address]



               



               



               

 

 IMMUNOLOGY  C-REACTIVE  15.8 mg/L  <=2.9 mg/L        [facility]



   PROTEIN      [address]



               



               



               

 

 HEMATOLOGY  PT  13.0 s  12.0 -        [facility]



       14.      [facility]



               



               



               

 

 HEMATOLOGY  INR  0.98  0.85 -        [facility]



       1.      [facility]



               



               



               

 

 HEMATOLOGY  PTT  33.2 s  22.9 -        [facility]



       35.      [facility]



               



               



               

 

 BLOOD BANK  Antibody  Negative          [facility]



 RESULTS  Scrn            [facility]



     (5/3/18 6:51 PM)          [place]



               



               



               

 

 BLOOD BANK  ABO/Rh  AB POS          [facility]



 RESULTS        [address] [place] [address]



               



               



               

 

 URINE AND  UA  0.2 EU/dL  0.1 - 1.0        [facility]



 STOOL  Urobilinogen      /[address] [place] [address]



               



               



               

 

 URINE AND  UA Nitrite  Negative  Negative        [facility]



 STOOL              [facility]



     (5/3/18 6:35 PM)          [place]



               



               



               

 

 URINE AND  UA Glucose  Negative  Negative        [facility]              [facility]



     (5/3/18 6:35 PM)          [place]



               



               



               

 

 URINE AND  UA Ketones  Negative  Negative        [place] [address]      Medical



     *NA*          Center



               



     (5/3/18 6:35 PM)          



               

 

 URINE AND  UA Bili  Negative  Negative        [facility]        2018      Medical



     *NA*          [place]



               



     (5/3/18 6:35 PM)          



               

 

 URINE AND  UA Blood  Trace  Negative        [facility]              Medical



     *ABN*          [place]



               



     (5/3/18 6:35 PM)          



               

 

 URINE AND  UA Leuk Est  Small  Negative        [facility]              Medical



     *ABN*          [place]



               



     (5/3/18 6:35 PM)          



               

 

 URINE AND  UA Spec Grav  1.020  <=1.030        [address]



               



               



               

 

 URINE AND  UA pH  6.0  5.0 - 8.0        [address]



               



               



               

 

 URINE AND  UA Color  Yellow  Yellow        [facility]        2018      Medical



     *NA*          [place]



               



     (5/3/18 6:35 PM)          



               

 

 URINE AND  UA Protein  Trace  Negative        [facility]              Medical



     *ABN*          [place]



               



     (5/3/18 6:35 PM)          



               

 

 URINE AND  UA Turbidity  Slight Cloudy  Clear        [address]



     (5/3/18 6:35 PM)          [place]



               



               



               

 

 URINE AND  UA Hyal Cast  0-2  0 - 2        [address]



     (5/3/18 6:35 PM)          [place]



               



               



               

 

 URINE AND  UA Bacteria  Occasional  None Seen        [facility]    /HPF  /HPF  /      [facility]



               



               



               

 

 URINE AND  UA RBC  11-20 /HPF  0 - 2        [address]



               



               



               

 

 URINE AND  UA Sq Epi  Occasional  Few /LPF        [facility]    /LPF    /[address] [place] [address]



               



               



               

 

 URINE AND  UA WBC    None Seen        [facility]    /HPF  /HPF  /[address] [place] [address]



               



               



               

 

 HEMATOLOGY  Basophils #  0.1 K/CMM  0.0 - 0.2        [address]



               



               



               

 

 HEMATOLOGY  Polychrom  Slight          [address]



               



               



               

 

 HEMATOLOGY  Plt Morph  Normal          [address]



     (5/3/18 6:20 PM)          [place]



               



               



               

 

 Brain  Brain shunt  EXAM: SKULL 2 VIEWS        -  [facility]



 shunt  series DX      /    -  Medical



 series DX    EXAM: CHEST 2 VIEWS        This report was dictated by a 
Radiology Resident/Fellow.  I have personally reviewed the images as  Center



             well as the Resident's interpretation and agree with the findings.
  



     EXAM: ABDOMEN 2 VIEWS        Read by:  Bernardo Lorenz MD        
  Resident:  Bernardo Lorenz MD  



             Dictated Date/time:  18 20:33  



             Electronically Signed by:  Martin Phillips MD                      
   18 22:11  



             FINAL REPORT  



     DATE: 5/3/2018 7:20 PM CDT          



               



               



               



     INDICATION: Headache. - Please include Codman view for shunt setting.     
     



               



               



               



     COMPARISON: Brain shuntogram 2013          



               



               



               



     TECHNIQUE: AP and lateral views of the skull, chest and abdomen.          



               



               



               



     FINDINGS:          



               



     There is a single intact shunt tube with the proximal aspect in the right 
frontal calvarium coursing across midline to the left anterior chest and 
abdomen with the tip coiled within the pelvis.          



               



               



               



     A right parietal shunt with distal tip in the right lateral abdomen is 
discontinuous. Another shunt present with proximal tip in the right neck base 
and distal tip in the medial mid abdomen          



               



     Cholecystectomy clips are noted. The lungs are clear but underinflated. 
Appearance of the pelvis is unchanged with bilateral shallow acetabular roofs. 
No obvious posterior dislocation. Spinal dysraphism          



      is seen with absence of the spinous process from L3 to S1.          



               



               



               



     IMPRESSION:          



               



               



               



     1. No significant change. The right transfrontal shunt catheter is intact 
along its course with the distal tip in the pelvis.          



               



     2. Discontinuous right parieto-occipital catheter and 2 discontinuous 
catheters in the right chest and abdomen are unchanged.          



               



     3. Spinal dysraphism.          



               



               



               



               

 

 Brain wo  Brain wo  EXAM: CT BRAIN WITHOUT CONTRAST        -  [facility]



 contrast  contrast CT      /    -  Medical



 CT            This report was dictated by a Radiology Resident/Fellow.  I have 
personally reviewed the images as  Center



             well as the Resident's interpretation and agree with the findings.
  



     DATE: 5/3/2018 627 PM CDT        Read by:  Bernardo Lorenz MD    
      Resident:  Bernardo Lorenz MD  



             Dictated Date/time:  18 18:45  



             Electronically Signed by:  Martin Phillips MD                      
   18 21:12  



             FINAL REPORT  



     INDICATION: 32-year-old male patient with history of  shunt malfunction 
         



               



               



               



     COMPARISON: CT of the brain 2015, 2013.          



               



               



               



     TECHNIQUE: Axial CT images of the brain were obtained. Sagittal and 
coronal reformats.          



               



     IV contrast: None.          



               



               



               



     FINDINGS:          



               



     An orphaned right occipital approach  shunt is present. Also present is 
a right frontal approach  shunt with tip in the left lateral ventricle.      
    



               



     Narrowing of the lateral ventricles is present, unchanged from 2015. 
         



               



     There is mild uncal and cerebellar tonsillar herniation, also unchanged.  
        



               



     No recent hemorrhage or territorial infarct. No mass. No midline shift.   
       



               



     No scalp fluid collections.          



               



     Sinuses are clear.          



               



               



               



     IMPRESSION:          



               



     No acute intracranial abnormality.          



               



     Adequately decompressed ventricular system.          



               



               



               



               

 

 Chest  Chest 1view  EXAM: XR CHEST 1 VIEW        -  [facility]



 1view DX  DX      /    -  Medical



             This report was dictated by a Radiology Resident/Fellow.  I have 
personally reviewed the images as  Center



             well as the Resident's interpretation and agree with the findings.
  



     DATE: 5/3/2018 6:12 PM CDT        Read by:  Olvin Palacio MD          
       Resident:  Olvin Palacio MD  



             Dictated Date/time:  18 18:31  



             Electronically Signed by:  Bakari Interiano MD              
   18 19:29  



             FINAL REPORT  



     INDICATION:  - picc line use          



               



               



               



     UT SECTION: ER          



               



               



               



     COMPARISON: None.          



               



               



               



     TECHNIQUE: AP chest          



               



               



               



     FINDINGS:          



               



     Lines, tubes and hardware:          



               



     Left PICC tip at mid SVC.          



               



               



               



     Lungs and pleura: No pulmonary or pleural based abnormality is identified. 
Pulmonary vascularity is normal.          



               



               



               



     Heart and mediastinum: The heart size is normal for technique. The 
mediastinal contours are normal.          



               



               



               



     Bones: No acute bony abnormality is identified.          



               



               



               



     Upper abdomen: Normal.          



               



               



               



               



               



     IMPRESSION:          



               



     Left PICC tip at mid SVC.          



               



               



               



     No acute cardiopulmonary abnormality is observed.          



               



               



               



               

 

 Laboratory  Sodium Level  142 mEq/L  135 - 145        [facility]



 Studies        /      [facility]



               [place]



               



               



               

 

 Laboratory  Potassium  4.4 mEq/L  3.6 - 5.0        [facility]



 Studies  Level      /      [facility]



               [place]



               



               



               

 

 Laboratory  Magnesium  1.8 mg/dL  1.8 - 2.5        [facility]



 Studies  Level      /      [facility]



               [place]



               



               



               

 

 Laboratory  Glucose  126 mg/dL  65 - 120        [facility]



 Studies  Level      /      [facility]



               [place]



               



               



               

 

 Laboratory  Estimat  null  90        [facility]



 Studies  Glomerular      /      [facility]



   Filtration            [place]



   Rate            



               



               

 

 Laboratory  Creatinine  0.83 mg/dL  0.61 -        [facility]



 Studies      1.24        [facility]



               [place]



               



               



               

 

 Laboratory  Chloride  108 mEq/L  101 - 111        [facility].



 Studies  Level      /      [facility]



               [place]



               



               



               

 

 Laboratory  Carbon  27 mEq/L  21 - 31        [facility].



 Studies  Dioxide      /      [facility]



   Level            [place]



               



               



               

 

 Laboratory  Calcium  9.1 mg/dL  8.5 - 10.5        [facility].



 Studies  Level      /      [facility]



               [place]



               



               



               

 

 Laboratory  Blood Urea  15 mg/dL  6 - 20        [facility].



 Studies  Nitrogen      /      [facility]



               [place]



               



               



               

 

 Laboratory  White Blood  7.0 K/uL  4.3 - 10.9        [facility].



 Studies  Count      /      [facility]



               [place]



               



               



               

 

 Laboratory  Red Cell  17.4 %  12.1 -        [facility].



 Studies  Distribution    15.2        [facility]



   Width            [place]



               



               



               

 

 Laboratory  Red Blood  5.14 M/uL  4.33 -        [facility].



 Studies  Count    5.43        [facility]



               [place]



               



               



               

 

 Laboratory  Platelet  270 K/uL  152 - 406        [facility].



 Studies  Count      /      [facility]



               [place]



               



               



               

 

 Laboratory  Neutrophils  62.3 %  41.7 -        [facility].



 Studies  %    73.7  /      [facility]



               [place]



               



               



               

 

 Laboratory  Monocytes %  9.3 %  3.3 - 12.3        [facility].



 Studies        /      [facility]



               [place]



               



               



               

 

 Laboratory  Mean  8.3 fL  7.6 - 11.3        [facility].



 Studies  Platelet      /      [facility]



   Volume            [place]



               



               



               

 

 Laboratory  Mean  82.8 fL  80 - 100        [facility].



 Studies  Corpuscular      /      [facility]



   Volume            [place]



               



               



               

 

 Laboratory  Mean  33.4 g/dL  32.0 -        [facility].



 Studies  Corpuscular    36.0        [facility]



   Hemoglobin            [place]



   Concent            



               



               

 

 Laboratory  Mean  27.6 pg  27.0 -        [facility].



 Studies  Corpuscular    35.0  /      [facility]



   Hemoglobin            [place]



               



               



               

 

 Laboratory  Lymphocytes  24.6 %  15.3 -        [facility].



 Studies  %    44.8  /      [facility]



               [place]



               



               



               

 

 Laboratory  Hemoglobin  14.2 g/dL  13.6 -        [facility]



 Studies      17.9  /      [facility]



               [place]



               



               



               

 

 Laboratory  Hematocrit  42.6 %  39.6 -        [facility]



 Studies      49.0  /      [facility]



               [place]



               



               



               

 

 Laboratory  Eosinophils  3.3 %  0 - 4.4        [facility]



 Studies  %      /      [facility]



               [place]



               



               



               

 

 Laboratory  Basophils %  0.5 %  0 - 1.3        [facility]



 Studies        /      [facility]



               [place]



               



               



               

 

 Laboratory  Absolute  4.4 K/uL  1.8 - 8.0        [facility]



 Studies  Neutrophil      /      [facility]



               [place]



               



               



               

 

 Laboratory  Absolute  0.7 K/uL  0.1 - 1.3        [facility]



 Studies  Monocytes      /      [facility]



   (CBC)            [place]



               



               



               

 

 Laboratory  Absolute  1.7 K/uL  0.7 - 4.9        [facility]



 Studies  Lymphocytes      /      [facility]



   (CBC)            [place]



               



               



               

 

 Laboratory  Absolute  0.2 K/uL  0 - 0.5        [facility]



 Studies  Eosinophils            [facility]



   (CBC)            [place]



               



               



               

 

 Laboratory  Absolute  0.0 K/uL  0 - 0.5        [facility]



 Studies  Basophils            [facility]



   (CBC)            [place]



               



               



               

 

 Microbiolo  Providencia  Providenci          [facility]



 gy Studies  Rettgeri  a Rettgeri    /      [facility]



               [place]



               



               



               

 

 Laboratory  Urine WBC  null          [facility]



 Studies        /      [facility]



               [place]



               



               



               

 

 Laboratory  Urine  null          [facility]



 Studies  Squamous      /      [facility]



   Epithelial            [place]



   Cells            



               



               

 

 Laboratory  Urine RBC  Urine RBC          [facility]



 Studies        /      [facility]



               [place]



               



               



               

 

 Laboratory  Urine  Urine          [facility]



 Studies  Culture  Culture    /      [facility]



   Reflexed  Reflexed          [place]



               



               



               

 

 Laboratory  Urine  null          [facility]



 Studies  Bacteria      /      [facility]



               [place]



               



               



               

 

 Laboratory  Urine pH  6.5          [facility]



 Studies        /2018      [facility]



               [place]



               



               



               

 

 Laboratory  Urine  2.0 mg/dL          [facility]



 Studies  Urobilinogen      /      [facility]



               [place]



               



               



               

 

 Laboratory  Urine Total  Urine          [facility]



 Studies  Protein  Total    /      [facility]



     Protein          [place]



               



               



               

 

 Laboratory  Urine  1.020          [facility]



 Studies  Specific      /      [facility]



   [place]            [place]



               



               



               

 

 Laboratory  Urine  Urine          [facility]



 Studies  Nitrite  Nitrite    /      [facility]



               [place]



               



               



               

 

 Laboratory  Urine  Urine          [facility]



 Studies  Leukocyte  Leukocyte    /      [facility]



   Esterase  Esterase          [place]



               



               



               

 

 Laboratory  Urine  Urine          [facility]



 Studies  Ketones  Ketones          [facility]



               [place]



               



               



               

 

 Laboratory  Urine  Urine          [facility]



 Studies  Glucose  Glucose          [facility]



               [place]



               



               



               

 

 Laboratory  Urine Color  Urine          [facility].



 Studies    Color    /      [facility]



               [place]



               



               



               

 

 Laboratory  Urine Blood  Urine          [facility].



 Studies    Blood          [facility]



               [place]



               



               



               

 

 Laboratory  Urine  Urine          [facility].



 Studies  Bilirubin  Bilirubin          [facility]



               [place]



               



               



               

 

 Laboratory  Urine  Urine          [facility].



 Studies  Appearance  Appearance    /      [facility]



               [place]



               



               



               

 

 Laboratory  Prothrombin  13.0  9.5 - 12.5        [facility]



 Studies  Time  SECONDS          [facility]



               [place]



               



               



               

 

 Laboratory  INR  1.10          [facility].



 Studies  Internationa      /      [facility]



   l Normalized            [place]



   Ratio            



               



               

 

 Laboratory  Total  1.9 mg/dL  0.3 - 1.2        [facility]



 Studies  Bilirubin      /      [facility]



               [place]



               



               



               

 

 Laboratory  Serum Total  7.8 g/dL  6.0 - 8.3        [facility].



 Studies  Protein      /      [facility]



               [place]



               



               



               

 

 Laboratory  Globulin  4.2 g/dL  2.3 - 3.5        [facility].



 Studies        /      [facility]



               [place]



               



               



               

 

 Laboratory  Direct  0.6 mg/dL  0 - 0.2        [facility]



 Studies  Bilirubin      /      [facility]



               [place]



               



               



               

 

 Laboratory  Aspartate  88 IU/L  10 - 42        [facility].



 Studies  Amino Transf      /      [facility]



   (AST/SGOT)            [place]



               



               



               

 

 Laboratory  Alkaline  192 IU/L  42 - 121        [facility].



 Studies  Phosphatase      /      [facility]



               [place]



               



               



               

 

 Laboratory  Albumin/Glob  0.9  1.1 - 1.8        [facility]



 Studies  ulin Ratio      /      [facility]



               [place]



               



               



               

 

 Laboratory  Albumin  3.6 g/dL  3.2 - 5.5        [facility].



 Studies        /      [facility]



               [place]



               



               



               

 

 Laboratory  Alanine  135 IU/L  10 - 60        [facility].



 Studies  Aminotransfe      /      [facility]



   rase            [place]



   (ALT/SGPT)            



               



               

 

 Laboratory  Procalcitoni  0.44 ng/mL          [facility].



 Studies  n            [facility]



               [place]



               



               



               

 

 Laboratory  B-Type  20 pg/ml          [facility].



 Studies  Natriuretic      /      [facility]



   Peptide            [place]



               



               



               

 

 Laboratory  Lipase  22 U/L  22 - 51        [facility]



 Studies              [facility]



               [place]



               



               



               

 

 Laboratory  Rapid  null          [facility]



 Studies  Troponin I            [facility]



               [place]



               



               



               

 

 Laboratory  Lactic Acid  8.6 mg/dL  4.5 - 19.8        [facility]



 Studies  Level            [facility]



               [place]



               



               



               

 

 Microbiolo  Morganella  Morganella          [facility]



 gy Studies  Morganii  Morganii          [facility]



               [place]



               



               



               

 

 Microbiolo  Proteus  Proteus          [facility]



 gy Studies  Mirabilis  Mirabilis          [facility]



               [place]



               



               



               

 

 Laboratory  Activated  32.2  24.3 -        [facility]



 Studies  Partial  SECONDS  36.9        [facility]



   Thromboplast            [place]



   Time            



               



               







Vital Signs







 Vital Sign  Value  Date  Comments  Source



         

 

 Temperature Oral (F)  97.2 F  2018    [facility]



         

 

 Systolic (mm Hg)  127  2018    [facility]



         

 

 Diastolic (mm Hg)  85  2018    [facility]



         

 

 Heart Rate  81  2018    [facility]



         

 

 Respitory Rate  18  2018    [facility]



         

 

 Heart Rate  90  2018    [facility]



         

 

 Systolic (mm Hg)  106  2018    [facility]



         

 

 Diastolic (mm Hg)  71  2018    [facility]



         

 

 Respitory Rate  18  2018    [facility]



         

 

 Temperature Oral (F)  98.1 F  2018    [facility]



         

 

 Respitory Rate  18  2018    [facility]



         

 

 Systolic (mm Hg)  168  2018    [facility]



         

 

 Diastolic (mm Hg)  107  2018    [facility]



         

 

 Heart Rate  87  2018    [facility]



         

 

 Temperature Oral (F)  98.1 F  2018    [facility]



         

 

 Weight  127.027  2018    [facility]



         

 

 Weight  127.027  2018    [facility]



         

 

 Weight  127.027  2018    [facility]



         

 

 BMI Calculated  48.16  2018    [facility]



         

 

 Height  162.56 cm  2018    [facility]



         

 

 Height  149.86 cm  2018    [facility]



         

 

 BMI Calculated  56.67  2018    [facility]



         

 

 Heart Rate  85  2018    [facility]



         [place]



         

 

 Systolic (mm Hg)  145  2018    [facility]



         [place]



         

 

 Diastolic (mm Hg)  66  2018    [facility]



         [place]



         

 

 Temperature Oral (F)  97.7 F  2018    [facility]



         [place]



         

 

 Respitory Rate  16  2018    [facility]



         [place]



         

 

 Height  59  2018    [facility]



         [place]



         

 

 Weight  280  2018    [facility]



         [place]



         







Encounters







 Location  Location  Encounter  Encounter  Reason  Attending  ADM  DC  Status  
Source



   Details  Type  Number  For  Provider  Date  Date    



         Visit          



                   



                   



                   

 

 [facility]    Discharged  [id number]          [facility]    Rec[facility]        /2017    [facility]



 [place]                  [place]



                   [facility]



                   



                   

 

 [facility]    Discharged  [id number]          [facility]



 [facility]    Recurring        /2018    [facility]



 [place]                  [place]



                   [place]



                   



                   

 

 [facility]    Departed  [id number]          [facility]



 [facility]    Emergency        /2018    [facility]



 [place]                  [place]



                   [place]



                   



                   

 

 [facility]    Discharged  [id number]          [facility]



 [facility]    Recurring        /2018    [facility]



 [place]                  [place]



                   [place]



                   



                   

 

 [facility]    Registered  [id number]      03/15      [facility]



 [facility]    Recurring        /      [facility]



 [place]                  [place]



                   [place]



                   



                   

 

 [facility]    Discharged  [id number]          [facility]



 [facility]    Inpatient        /2018    [facility]



 [place]                  [place]



                   [place]



                   



                   

 

 [facility]    Inpatient  [id number]    [place]      [facility]  /2018    [facility]



                   



                   



                   







Procedures







 Procedure  Code  Date  Perfomer  Comments  Source



           



           

 

 Chest Single View  [id number]        [facility] -



     [facility]      [place]



           



           

 

 Gram Stain  [id number]        [facility]



     8      [place]



           



           

 

 Culture & Sensitivity  [id number]        [facility] -



     8      [place]



           



           

 

 Anaerobic Blood  [id number]        [facility] -



 Culture    8      [place]



           



           

 

 Aerobic Blood Culture  [id number]        [facility] -



     8      [place]



           



           

 

 Chest Single View  [id number]        [facility]



     [facility]      [place]



           



           

 

 Gram Stain  [id number]        [facility] -



     8      [place]



           



           

 

 Culture & Sensitivity  [id number]        [facility]



     8      [place]



           



           

 

 Chest Single View  [id number]        [facility] -



     8      [place]



           



           

 

 Cholecystectomy  [id number]        [facility]



           



           

 

 Shunt of cerebral  [id number]        [facility]



 ventricle to          [facility]



 extracranial site

## 2019-02-20 NOTE — EDPHYS
Physician Documentation                                                                           

 [facility]                                                                

Name: Redd Dale Jr                                                                            

Age: 33 yrs                                                                                       

Sex: Male                                                                                         

: 1985                                                                                   

MRN: [id number]                                                                                   

Arrival Date: 2019                                                                          

Time: 12:42                                                                                       

Account#: [id number]                                                                            

Bed 16                                                                                            

Private MD: QUINCY CONNELL                                                                    

ED Physician Chris Mcpherson                                                                      

HPI:                                                                                              

                                                                                             

14:06 This 33 yrs old Black Male presents to ER via EMS with complaints of Fall Injury.       pm1 

14:06 Details of fall: The patient fell from seated position, out of a chair, in the shower,  pm1 

      while transferring. Onset: The symptoms/episode began/occurred just prior to arrival.       

      Associated injuries: The patient sustained injury to the head, neck injury, pain. The       

      patient has experienced similar episodes in the past, multiple times. The patient has       

      not recently seen a physician. Patient currently living in nursing home. Patient was        

      taking a shower with assistance from nursing home staff. Patient transferring from          

      shower chair to wheelchair and he slid down in the seat and hit the back of head on the     

      shower chair. No LOC. No vomiting. Patient reported some initial buttocks pain but that     

      has resolved.                                                                               

                                                                                                  

Historical:                                                                                       

- Allergies:                                                                                      

13:40 Amoxicillin;                                                                            bp  

13:40 Bactrim;                                                                                bp  

13:40 Ciprofloxacin;                                                                          bp  

13:40 CLAVULANIC ACID;                                                                        bp  

13:40 Doxycycline;                                                                            bp  

13:40 Levofloxacin;                                                                           bp  

13:40 Morphine;                                                                               bp  

13:40 Toradol;                                                                                bp  

13:40 TRIMETHOPRIM;                                                                           bp  

13:40 Vancomycin;                                                                             bp  

13:40 Zofran;                                                                                 bp  

- Home Meds:                                                                                      

13:40 Celexa 20 mg Oral tab 1 tab once daily [Active]; fentanyl 25 mcg/hr Topical pt72 1      bp  

      patch every 72 hours [Active]; Pepcid 20 mg oral tab 1 tab once daily [Active];             

      Percocet  mg Oral tab 1 tab every 6 hours [Active]; Reglan 10 mg Oral tab 1 tab       

      once daily [Active]; Wellbutrin  mg Oral TbER 1 tab 2 times per day [Active];         

- PMHx:                                                                                           

13:40 Asthma; Cerebral Palsy; cluster headaches; decubitus ulcers on feet; GERD;              bp  

      Hydrocephalus; Hypertension; spina bifida;                                                  

                                                                                                  

- Immunization history:: Adult Immunizations up to date.                                          

- Social history:: Smoking status: Patient/guardian denies using tobacco.                         

- Ebola Screening: : Patient negative for fever greater than or equal to 101.5 degrees            

  Fahrenheit, and additional compatible Ebola Virus Disease symptoms Patient denies               

  exposure to infectious person Patient denies travel to an Ebola-affected area in the            

  21 days before illness onset No symptoms or risks identified at this time.                      

                                                                                                  

                                                                                                  

ROS:                                                                                              

14:06 Constitutional: Negative for fever, chills, and weight loss, Eyes: Negative for injury, pm1 

      pain, redness, and discharge, ENT: Negative for injury, pain, and discharge.                

14:06 Cardiovascular: Negative for chest pain, palpitations, and edema, Respiratory: Negative     

      for shortness of breath, cough, wheezing, and pleuritic chest pain, Abdomen/GI:             

      Negative for abdominal pain, nausea, vomiting, diarrhea, and constipation, Back:            

      Negative for injury and pain, : Negative for injury, bleeding, discharge, and             

      swelling, MS/Extremity: Negative for injury and deformity, Skin: Negative for injury,       

      rash, and discoloration.                                                                    

14:06 Neck: Positive for pain at rest, Negative for stiffness.                                    

14:06 Neuro: Positive for headache, Negative for dizziness, loss of consciousness, numbness,      

      tingling, weakness.                                                                         

                                                                                                  

Exam:                                                                                             

14:06 Constitutional:  This is a well developed, well nourished patient who is awake, alert,  pm1 

      and in no acute distress. Head/Face:  Normocephalic, atraumatic. Eyes:  Pupils equal        

      round and reactive to light, extra-ocular motions intact.  Lids and lashes normal.          

      Conjunctiva and sclera are non-icteric and not injected.  Cornea within normal limits.      

      Periorbital areas with no swelling, redness, or edema. ENT:  Nares patent. No nasal         

      discharge, no septal abnormalities noted.  Tympanic membranes are normal and external       

      auditory canals are clear.  Oropharynx with no redness, swelling, or masses, exudates,      

      or evidence of obstruction, uvula midline.  Mucous membranes moist. Neck:  Trachea          

      midline, no thyromegaly or masses palpated, and no cervical lymphadenopathy.  Supple,       

      full range of motion without nuchal rigidity, or vertebral point tenderness.  No            

      Meningismus. Chest/axilla:  Normal chest wall appearance and motion.  Nontender with no     

      deformity.  No lesions are appreciated. Cardiovascular:  Regular rate and rhythm with a     

      normal S1 and S2.  No gallops, murmurs, or rubs.  Normal PMI, no JVD.  No pulse             

      deficits. Respiratory:  Lungs have equal breath sounds bilaterally, clear to                

      auscultation and percussion.  No rales, rhonchi or wheezes noted.  No increased work of     

      breathing, no retractions or nasal flaring. Abdomen/GI:  Soft, non-tender, with normal      

      bowel sounds.  No distension or tympany.  No guarding or rebound.  No evidence of           

      tenderness throughout. Back:  No spinal tenderness.  No costovertebral tenderness.          

      Full range of motion. Skin:  Warm, dry with normal turgor.  Normal color with no            

      rashes, no lesions, and no evidence of cellulitis. MS/ Extremity:  Pulses equal, no         

      cyanosis.  Neurovascular intact.  Full, normal range of motion.                             

14:06 Neuro: Orientation: is normal, Sensation: is normal, no obvious gross deficits.             

                                                                                                  

Vital Signs:                                                                                      

13:02  / 96; Pulse 74; Resp 14; Temp 98; Pulse Ox 100% ; Weight 81.65 kg;               bp  

14:54  / 89; Pulse 71; Resp 14; Pulse Ox 99% ;                                          bp  

                                                                                                  

MDM:                                                                                              

13:09 Patient medically screened.                                                             pm1 

14:37 Data reviewed: vital signs. Data interpreted: Pulse oximetry: on room air is 100 %.     pm1 

      Interpretation: normal. Counseling: I had a detailed discussion with the patient and/or     

      guardian regarding: the historical points, exam findings, and any diagnostic results        

      supporting the discharge/admit diagnosis, radiology results, the need for outpatient        

      follow up, to return to the emergency department if symptoms worsen or persist or if        

      there are any questions or concerns that arise at home.                                     

14:40 ED course: Patient requested pain medication in the ER. Discussed pain management with  pm1 

      Dr. Mcpherson. Recommended patient to be discharged and receive pain medication, Percocet      

      and fentanyl at nursing home. Its stronger medication than what the ER can discharge as     

      a prescription.                                                                             

                                                                                                  

                                                                                             

13:26 Order name: CT Head C Spine; Complete Time: 14:06                                       pm1 

                                                                                                  

Administered Medications:                                                                         

No medications were administered                                                                  

                                                                                                  

                                                                                                  

Disposition:                                                                                      

                                                                                             

07:14 Co-signature as Attending Physician, Chris Mcpherson MD Available for consultation at    ps1 

      all times. .                                                                                

                                                                                                  

Disposition:                                                                                      

19 14:39 Discharged to Home. Impression: Superficial injury of head, Headache.              

- Condition is Stable.                                                                            

- Discharge Instructions: Head Injury, Adult.                                                     

                                                                                                  

- Medication Reconciliation Form, Thank You Letter, Prescription Opioid Use form.                 

- Follow up: Emergency Department; When: As needed; Reason: Worsening of condition.               

  Follow up: Private Physician; When: 2 - 3 days; Reason: Recheck today's complaints,             

  Continuance of care, Re-evaluation by your physician.                                           

- Problem is new.                                                                                 

- Symptoms have improved.                                                                         

                                                                                                  

                                                                                                  

                                                                                                  

Signatures:                                                                                       

Dispatcher [employer]                           EDMS                                                 

Denver Barry NP                    NP   pm1                                                  

Huan Martinez RN                      RN   bp                                                   

Chris Mcpherson MD MD   ps1                                                  

                                                                                                  

Corrections: (The following items were deleted from the chart)                                    

                                                                                             

15:27 14:39 2019 14:39 Discharged to Home. Impression: Superficial injury of head;      bp  

      Headache. Condition is Stable. Forms are Medication Reconciliation Form, Thank You          

      Letter, Antibiotic Education, Prescription Opioid Use. Follow up: Emergency Department;     

      When: As needed; Reason: Worsening of condition. Follow up: Private Physician; When: 2      

      - 3 days; Reason: Recheck today's complaints, Continuance of care, Re-evaluation by         

      your physician. Problem is new. Symptoms have improved. pm1                                 

                                                                                                  

**************************************************************************************************

## 2019-02-20 NOTE — XMS REPORT
[facility]

 Created on:2018



Patient:Redd Dale

Sex:Male

:1985

External Reference #:[id number]





Demographics







 Address  [address]  [place] [address]

 

 Phone  [phone]

 

 Preferred Language  en

 

 Marital Status  Unknown

 

 Am[employer] Affiliation  Unknown

 

 Race  Black or 

 

 Ethnic Group  Unknown









Author







 Organization  [employer]









Care Team Providers







 Name  Role  Phone

 

 Knox, Na  Provider Role  Unavailable









Allergies, Adverse Reactions, Alerts







 Substance  Reaction  Event Type

 

 Zofran  Info Not Available  Drug Allergy

 

 Morphine Sulfate ER  Info Not Available  Drug Allergy

 

 Levaquin  Info Not Available  Drug Allergy







Problems







 Problem Type  Condition  Code  Onset Dates  Condition Status

 

 Assessment  Mental disorder, not otherwise  F99    Active



   specified      

 

 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



   disorder      

 

 Assessment  Ulcer of left foot, unspecified  L97.529    Active



   ulcer stage      

 

 Problem  Foot ulcer  L97.509    Active

 

 Assessment  Nonintractable episodic headache,  R51    Active



   unspecified headache type      

 

 Problem  Constipation  K59.00    Active

 

 Assessment  Episodic tension-type headache, not  G44.219    Active



   intractable      

 

 Problem  Paraplegia  G82.20    Active

 

 Problem  Incontinence of urine  R32    Active

 

 Problem  Nausea alone  R11.0    Active

 

 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



   disorder      

 

 Problem  Mental disorder, not otherwise  F99    Active



   specified      

 

 Assessment  Bipolar depression  F31.30    Active

 

 Assessment  Lumbar spina bifida with  Q05.2    Active



   hydrocephalus      

 

 Problem  Bipolar depression  F31.30    Active

 

 Assessment   (ventriculoperitoneal) shunt  Z98.2    Active



   status      

 

 Problem  Depression with anxiety  F41.8    Active

 

 Problem  Fecal incontinence  R15.9    Active

 

 Problem  Nausea and vomiting, intractability  R11.2    Active



   of vomiting not specified,      



   unspecified vomiting type      

 

 Problem  Ulcer of left foot, unspecified  L97.529    Active



   ulcer stage      

 

 Problem  Episodic tension-type headache, not  G44.219    Active



   intractable      

 

 Problem  Nonintractable episodic headache,  R51    Active



   unspecified headache type      

 

 Assessment  Depression with anxiety  F41.8    Active

 

 Problem  Dependence on wheelchair  Z99.3    Active

 

 Problem  Lumbar spina bifida with  Q05.2    Active



   hydrocephalus      

 

 Problem   (ventriculoperitoneal) shunt  Z98.2    Active



   status      

 

 Problem  Nicotine dependence  F17.200    Active







Medications







 Medication  Code  Code  Instructions  Start  End  Status  Dosage



   System      Date  Date    

 

 Collagenase  NDC  [address]-31  250 UNIT/GM      Active  1 application



       Externally        to affected



       Once a day        area

 

 Macrobid  NDC  16287396146  100 MG Orally      Active  1 capsule



       every 12 hrs        with food

 

 Tylenol with  NDC  63714593343  300-60 MG      Active  1 tablet as



 Codeine #4      Orally every 6        needed



       hrs        

 

 Citalopram  NDC  84163467838  10 MG Orally  July    Active  1 tablet



 Hydrobromide      Once a day  2018      

 

 Pantoprazole  NDC  33732173335  40 MG Orally      Active  1 tablet



 Sodium      Once a day        

 

 Seroquel  NDC  31443557944  25 MG Orally  July    Active  1 tablet



       Once a day at  16,      



       bedtime        







Results

No Known Results



Summary Purpose

[employer] Submission

## 2019-02-20 NOTE — XMS REPORT
[facility]

 Created on:2018



Patient:Redd Dale

Sex:Male

:1985

External Reference #:[id number]





Demographics







 Address  [address] [place] [address]

 

 Phone  [phone]

 

 Preferred Language  en

 

 Marital Status  Unknown

 

 Holiness Affiliation  Unknown

 

 Race  Black or 

 

 Ethnic Group  Not  or 









Author







 Organization  [employer]









Care Team Providers







 Name  Role  Phone

 

 Knox, Na  Provider Role  Unavailable









Allergies

No Known Allergies



Problems







 Problem Type  Condition  Code  Onset Dates  Condition Status

 

 Problem  Nicotine dependence  F17.200    Active

 

 Problem  Lumbar spina bifida with  Q05.2    Active



   hydrocephalus      

 

 Problem  Dependence on wheelchair  Z99.3    Active

 

 Problem  Fecal incontinence  R15.9    Active

 

 Problem  Foot ulcer  L97.509    Active

 

 Problem  Depression with anxiety  F41.8    Active

 

 Problem  Paraplegia  G82.20    Active

 

 Problem  Constipation  K59.00    Active

 

 Problem  Incontinence of urine  R32    Active

 

 Problem  Nausea alone  R11.0    Active

 

 Problem  Episodic tension-type headache, not  G44.219    Active



   intractable      

 

 Problem  Nonintractable episodic headache,  R51    Active



   unspecified headache type      

 

 Problem   (ventriculoperitoneal) shunt  Z98.2    Active



   status      







Medications

No Known Medications



Results

No Known Results



Summary Purpose

[employer] Submission

## 2019-02-20 NOTE — ER
Nurse's Notes                                                                                     

 [facility]                                                                

Name: Redd Dale Jr                                                                            

Age: 33 yrs                                                                                       

Sex: Male                                                                                         

: 1985                                                                                   

MRN: [id number]                                                                                   

Arrival Date: 2019                                                                          

Time: 12:42                                                                                       

Account#: [id number]                                                                            

Bed 16                                                                                            

Private MD: QUINCY CONNELL                                                                    

Diagnosis: Superficial injury of head;Headache                                                    

                                                                                                  

Presentation:                                                                                     

                                                                                             

13:02 Presenting complaint: EMS states: FALL FROM CHAIR ONTO BUTTOCKS. Transition of care:    bp  

      patient was received from another setting of care (long-term care facility), [facility]. Onset of symptoms was 2019 at 12:30. Risk           

      Assessment: Do you want to hurt yourself or someone else? Patient reports no desire to      

      harm self or others. Initial Sepsis Screen: Does the patient meet any 2 criteria? No.       

      Patient's initial sepsis screen is negative. Does the patient have a suspected source       

      of infection? No. Patient's initial sepsis screen is negative. Care prior to arrival:       

      None.                                                                                       

13:02 Method Of Arrival: EMS: [facility]                                                bp  

13:02 Acuity: AYESHA 4                                                                           bp  

                                                                                                  

Triage Assessment:                                                                                

13:02 General: Appears in no apparent distress. comfortable, obese, Behavior is cooperative,  bp  

      appropriate for age, anxious. Pain: Complains of pain in head and back of neck. EENT:       

      No deficits noted. Neuro: AT BASELINE. Cardiovascular: No deficits noted. Respiratory:      

      Airway is patent Respiratory effort is even, unlabored, Respiratory pattern is regular,     

      symmetrical. GI: No signs and/or symptoms were reported involving the gastrointestinal      

      system. : Ortega in place. Derm: No deficits noted. Musculoskeletal: Circulation,          

      motion, and sensation intact. Range of motion: intact in all extremities.                   

                                                                                                  

Historical:                                                                                       

- Allergies:                                                                                      

13:40 Amoxicillin;                                                                            bp  

13:40 Bactrim;                                                                                bp  

13:40 Ciprofloxacin;                                                                          bp  

13:40 CLAVULANIC ACID;                                                                        bp  

13:40 Doxycycline;                                                                            bp  

13:40 Levofloxacin;                                                                           bp  

13:40 Morphine;                                                                               bp  

13:40 Toradol;                                                                                bp  

13:40 TRIMETHOPRIM;                                                                           bp  

13:40 Vancomycin;                                                                             bp  

13:40 Zofran;                                                                                 bp  

- Home Meds:                                                                                      

13:40 Celexa 20 mg Oral tab 1 tab once daily [Active]; fentanyl 25 mcg/hr Topical pt72 1      bp  

      patch every 72 hours [Active]; Pepcid 20 mg oral tab 1 tab once daily [Active];             

      Percocet  mg Oral tab 1 tab every 6 hours [Active]; Reglan 10 mg Oral tab 1 tab       

      once daily [Active]; Wellbutrin  mg Oral TbER 1 tab 2 times per day [Active];         

- PMHx:                                                                                           

13:40 Asthma; Cerebral Palsy; cluster headaches; decubitus ulcers on feet; GERD;              bp  

      Hydrocephalus; Hypertension; spina bifida;                                                  

                                                                                                  

- Immunization history:: Adult Immunizations up to date.                                          

- Social history:: Smoking status: Patient/guardian denies using tobacco.                         

- Ebola Screening: : Patient negative for fever greater than or equal to 101.5 degrees            

  Fahrenheit, and additional compatible Ebola Virus Disease symptoms Patient denies               

  exposure to infectious person Patient denies travel to an Ebola-affected area in the            

  21 days before illness onset No symptoms or risks identified at this time.                      

                                                                                                  

                                                                                                  

Screenin:05 Abuse screen: Denies threats or abuse. Denies injuries from another. Nutritional        bp  

      screening: No deficits noted. Tuberculosis screening: No symptoms or risk factors           

      identified. Fall Risk Fall in past 12 months (25 points). No secondary diagnosis (0         

      pts). No IV (0 pts). Ambulatory Aid- None/Bed Rest/Nurse Assist (0 pts). Gait-              

      Normal/Bed Rest/Wheelchair (0 pts) Mental Status- Oriented to own ability (0 pts).          

      Total Kim Fall Scale indicates Low Risk Score (25-44 pts). Fall prevention measures       

      have been instituted. Side Rails Up X 2 Placed close to Nursing Station Frequent            

      Obs/Assesments occuring As available Patient and Family Educated on Fall Prevention         

      Program and strategies.                                                                     

                                                                                                  

Assessment:                                                                                       

13:05 General: SEE TRIAGE NOTE.                                                               bp  

14:19 Reassessment: PT RETURNED FROM [facility], NO ABNORMALITIES NOTED, PER PROVIDER.               bp  

14:54 Reassessment: PT D/C HOME,  HEALTHCARE CONTACTED, TRANSPORT EN ROUTE.                 bp  

15:27 Reassessment: PT LILI WITH  HEALTHCARE TRANSPORT.                                      bp  

                                                                                                  

Vital Signs:                                                                                      

13:02  / 96; Pulse 74; Resp 14; Temp 98; Pulse Ox 100% ; Weight 81.65 kg;               bp  

14:54  / 89; Pulse 71; Resp 14; Pulse Ox 99% ;                                          bp  

                                                                                                  

ED Course:                                                                                        

12:42 Patient arrived in ED.                                                                  rg4 

12:43 Camelia Knox MD is Private Physician.                                                      rg4 

12:48 QUINCY CONNELL is Private Physician.                                                rg4 

12:53 Huan Maritnez, RN is Primary Nurse.                                                    bp  

13:02 Arm band placed on.                                                                     bp  

13:03 Triage completed.                                                                       bp  

13:05 Patient has correct armband on for positive identification. Bed in low position. Call   bp  

      light in reach. Side rails up X2.                                                           

13:06 Denver Barry NP is PHCP.                                                           pm1 

13:06 Chris Mcpherson MD is Attending Physician.                                             pm1 

13:42 CT completed. Patient tolerated procedure well. Patient moved to CT via stretcher.      jg6 

      Patient moved back from CT.                                                                 

13:43 CT Head C Spine In Process Unspecified.                                                 EDMS

14:55 No provider procedures requiring assistance completed. Patient did not have IV access   bp  

      during this emergency room visit.                                                           

                                                                                                  

Administered Medications:                                                                         

No medications were administered                                                                  

                                                                                                  

                                                                                                  

Outcome:                                                                                          

14:39 Discharge ordered by MD.                                                                pm1 

15:27 Discharged to nursing home. Transfer form completed.                                    bp  

15:27 Condition: stable                                                                           

15:27 Discharge instructions given to patient, nursing home, Instructed on discharge              

      instructions, follow up and referral plans. Demonstrated understanding of instructions,     

      follow-up care.                                                                             

15:27 Patient left the ED.                                                                    bp  

                                                                                                  

Signatures:                                                                                       

Dispatcher [employer]                           EDMS                                                 

Denver Barry NP                    NP   pm1                                                  

Lauryn Long                                 rg4                                                  

Huan Martinez, RN                      RN   Michelle [id number]                                                  

                                                                                                  

**************************************************************************************************

## 2019-02-20 NOTE — XMS REPORT
Clinical Summary

 Created on:2019



Patient:Redd Dale

Sex:Male

:1985

External Reference #:[id number]





Demographics







 Address  [address]



   [place] [address]

 

 Home Phone  [phone]

 

 Preferred Language  English

 

 Marital Status  Unknown

 

 Roman Catholic Affiliation  Unknown

 

 Race  Black or 

 

 Ethnic Group  Not  or 









Author







 Organization  [facility]

 

 Address  [address] [place] [address]









Support







 Name  Relationship  Address  Phone

 

 Sabrina Dale  Unavailable  [address]  [phone]



     [place] [address]  









Care Team Providers







 Name  Role  Phone

 

 Ta  Primary Care Provider  [phone]









Allergies







 Active Allergy  Reactions  Severity  Noted Date  Comments

 

 Sulfamethoxazole-Trimethoprim  Hives  High  2017  

 

 Levofloxacin  Hives  High  2017  

 

 Morphine  Hives  High  2017  

 

 Sesame Seed  Hives    2017  

 

 Ketorolac  Rash  High  2017  

 

 Vancomycin Analogues  Rash  High  2017  

 

 Ondansetron Hcl (Pf)  Nausea And Vomiting    2017  







Medications







 Medication  Sig  Dispensed  Refills  Start Date  End Date  Status

 

 oxyCODONE-acetaminophe  Take 1 tablet by    0      Active



 n (PERCOCET)  mg  mouth every 4          



 per tablet  (four) hours as          



   needed for Pain.          







Active Problems







 Problem  Noted Date

 

 Spina bifida  2017

 

 Pyelonephritis  2017







Social History







 Tobacco Use  Types  Packs/Day  Years Used  Date

 

 Current Every Day Smoker  Cigarettes  0.5    









 Tobacco Cessation: Ready to Quit: No









 Sex Assigned at Birth  Date Recorded

 

 Not on file  









 Job Start Date  Occupation  Industry

 

 Not on file  Not on file  Not on file









 Travel History  Travel Start  Travel End









 No recent travel history available.







Last Filed Vital Signs

Not on file



Plan of Treatment

Not on file



Results

Not on fileafter 2018



Insurance







 Payer  Benefit Plan / Group  Subscriber ID  Type  Phone  Address

 

 MEDICAID - MEDICAID  [employer]  [id number]  Medicaid    



 [facility]      Non-Contracted    









 Guarantor Name  Account Type  Relation to  Date of  Phone  Billing Address



     Patient  Birth    

 

 Redd Dale  Personal/Family  Self  1985  [phone]  [address]



         (Home)  [address]







           [place]



           [address]







Advance Directives

For more information, please contact:[facility] [place], [place] [phone]





 Code Status  Date Activated  Date Inactivated  Comments

 

 Full Code  2017  1:36 AM  2017  8:43 PM  









 This code status was determined by:  Patient

## 2019-03-28 ENCOUNTER — HOSPITAL ENCOUNTER (EMERGENCY)
Dept: HOSPITAL 97 - ER | Age: 34
LOS: 1 days | Discharge: HOME | End: 2019-03-29
Payer: COMMERCIAL

## 2019-03-28 DIAGNOSIS — Z88.1: ICD-10-CM

## 2019-03-28 DIAGNOSIS — F17.210: ICD-10-CM

## 2019-03-28 DIAGNOSIS — R07.9: Primary | ICD-10-CM

## 2019-03-28 DIAGNOSIS — Z88.8: ICD-10-CM

## 2019-03-28 DIAGNOSIS — I10: ICD-10-CM

## 2019-03-28 DIAGNOSIS — Z88.0: ICD-10-CM

## 2019-03-28 DIAGNOSIS — Z88.3: ICD-10-CM

## 2019-03-28 DIAGNOSIS — Z88.5: ICD-10-CM

## 2019-03-28 PROCEDURE — 93005 ELECTROCARDIOGRAM TRACING: CPT

## 2019-03-28 PROCEDURE — 99284 EMERGENCY DEPT VISIT MOD MDM: CPT

## 2019-03-28 PROCEDURE — 96372 THER/PROPH/DIAG INJ SC/IM: CPT

## 2019-03-28 NOTE — XMS REPORT
[facility]

 Created on:2018



Patient:Redd Dale

Sex:Male

:1985

External Reference #:[id number]





Demographics







 Address  [address] [place] [address]

 

 Phone  [phone]

 

 Preferred Language  en

 

 Marital Status  Unknown

 

 Denominational Affiliation  Unknown

 

 Race  Black or 

 

 Ethnic Group  Not  or 









Author







 Organization  [employer]









Care Team Providers







 Name  Role  Phone

 

 Knox, Na  Provider Role  Unavailable









Allergies

No Known Allergies



Problems







 Problem Type  Condition  Code  Onset Dates  Condition Status

 

 Problem  Nicotine dependence  F17.200    Active

 

 Problem  Lumbar spina bifida with  Q05.2    Active



   hydrocephalus      

 

 Problem  Dependence on wheelchair  Z99.3    Active

 

 Problem  Fecal incontinence  R15.9    Active

 

 Problem  Foot ulcer  L97.509    Active

 

 Problem  Depression with anxiety  F41.8    Active

 

 Problem  Paraplegia  G82.20    Active

 

 Problem  Constipation  K59.00    Active

 

 Problem  Incontinence of urine  R32    Active

 

 Problem  Nausea alone  R11.0    Active

 

 Problem  Episodic tension-type headache, not  G44.219    Active



   intractable      

 

 Problem  Nonintractable episodic headache,  R51    Active



   unspecified headache type      

 

 Problem   (ventriculoperitoneal) shunt  Z98.2    Active



   status      







Medications

No Known Medications



Results

No Known Results



Summary Purpose

[employer] Submission

## 2019-03-28 NOTE — XMS REPORT
Continuity of Care Document

 Created on:2018



Patient:JORDAN VERDIN JR

Sex:Male

:1985

External Reference #:[id number]





Demographics







 Address  [address] [place] [address] [address] [place] [id number]

 

 Phone  [phone]

 

 Phone  [phone]

 

 Preferred Language  Unknown

 

 Marital Status  Unknown

 

 Buddhism Affiliation  Unknown

 

 Race  Unknown

 

 Ethnic Group  Unknown









Author







 Organization  [facility]









Problems







 Problem  Status  Onset  Classification  Date  Comments  Source



     Date    Reported    



             



             



             

 

 HEADACHE  Active  20        [address]



             

 

 SHUNT  Active  20        [address]



             

 

 ACUTE HEADACHE  Active  20        [address]



             

 

 Pressure ulcer  Active  20  Finding  2018    [facility]



 of right ankle,    18        [facility]



 stage 3            [place]



             



             

 

 Nausea &  Active  20  Finding  2018    [facility]



 vomiting    18        [facility]



             [place]



             



             

 

 Nausea and  Active  20  Finding  2018    [facility]



 vomiting    18        [facility]



             [place]



             



             

 

 Decubitus ulcer  Active  20  Finding  2018    [facility]



 of right ankle,    18        [facility]



 stage 3            [place]



             



             

 

 Chronic  Active  20  Finding  2018    [facility]



 indwelling Ortega    17        [facility]



 catheter            [place]



             



             

 

 Sacral ulcer  Resolved  20  Finding  2018    [facility]



     17        [facility]



             [place]



             



             

 

 Heel ulcer  Active  20  Finding  2018    [facility]



     17        [facility]



             [place]



             



             

 

 Cellulitis  Resolved  20  Finding  2018    [facility]



     17        [facility]



             [place]



             



             

 

 Lymphedema  Active  20  Finding  2018    [facility]



     17        [facility]



             [place]



             



             

 

 Chronic pain  Active  20  Finding  2018    [facility]



 disorder    17        [facility]



             [place]



             



             

 

 GERD  Active  20  Finding  2018    [facility]



     17        [facility]



             [place]



             



             

 

 Hypertension  Active  20  Finding  2018    [facility]



     17        [facility]



             [place]



             



             

 

 Spina bifida  Active  20  Finding  2018    [facility]



     17        [facility]



             [place]



             



             

 

 Stage II  Resolved  20  Finding  2018    [facility]



 pressure ulcer    16        [facility]



 of buttock            [place]



             



             

 

 Urinary tract  Resolved  07/22/20  Finding  2018    [facility]



 infectious    16        [facility]



 disease            [place]



             



             

 

 Stage IV  Active  20  Finding  2018    [facility]



 pressure ulcer    16        [facility]



 of heel            [place]



             



             

 

 Paraplegic  Active  20  Finding  2018    [facility]



 spinal paralysis    16        [facility]



             [place]



             



             

 

 Malaise  Active  10/16/20  Finding  2018    [facility]



     15        [facility]



             [place]



             



             

 

 Wound of left  Resolved  10/16/20  Finding  2018    [facility]



 ankle    15        [facility]



             [place]



             



             

 

 Leukocytosis  Active  10/16/20  Finding  2018    [facility]



     15        [facility]



             [place]



             



             

 

 Rectal  Resolved  10/16/20  Finding  2018    [facility]



 hemorrhage    15        [facility]



             [place]



             



             

 

 Decubitus ulcer,  Resolved  20  Finding  2018    [facility]



 infected    14        [facility]



             [place]



             



             

 

 Stage III  Active    Finding  2018    [facility]



 pressure ulcer            [facility]



             [place]



             



             

 

 Ulcer of scrotum  Resolved    Finding  2018    [facility]



             [facility]



             [place]



             



             

 

 Poor social  Active    Finding  2018    [facility]



 situation            [facility]



             [place]



             



             

 

 Pressure ulcer  Active    Finding  2018    [facility]



 of thigh, stage            [facility]



 3            [place]



             



             

 

 Stage III  Resolved    Finding  2018    [facility]



 pressure ulcer            [facility]



 of heel            [place]



             



             

 

 Pressure ulcer,  Inactive    Finding  2018    [facility]



 stage 3            [facility]



             [place]



             



             

 

 Stage II  Resolved    Finding  2018    [facility]



 pressure ulcer            [facility]



 of left ankle            [place]



             



             

 

 Stage III  Active    Finding  2018    [facility]



 pressure ulcer            [facility]



 of left ankle            [place]



             



             

 

 Stage II  Resolved    Finding  2018    [facility]



 pressure ulcer            [facility]



 of right ankle            [place]



             



             

 

 History of  Active    Finding  2018    [facility]



 Clostridium            [facility]



 difficile            [place]



 colitis            



             

 

 Pressure ulcer  Resolved    Finding  2018    [facility]



 of right leg,            [facility]



 stage 2            [place]



             



             

 

 Pressure ulcer  Active    Finding  2018    [facility]



 of right leg,            [facility]



 stage 3            [place]



             



             

 

 Asymptomatic  Active    Finding  2018    [facility]



 bacteriuria            [facility]



             [place]



             



             

 

 Ulcer of toe  Resolved    Finding  2018    [facility]



             [facility]



             [place]



             



             

 

 Incontinence  Active    Finding  2018    [facility]



 without sensory            [facility]



 awareness            [place]



             



             

 

 Unstageable  Resolved    Finding  2018    [facility]



 pressure sore            [facility]



             [place]



             



             

 

 Abdominal pain  Inactive    Finding  2018    [facility]



             [facility]



             [place]



             



             

 

 Cellulitis of  Resolved    Finding  2018    [facility]



 heel, left            [facility]



             [place]



             



             

 

 Clostridium  Resolved    Finding  2018    [facility]



 difficile            [facility]



 colitis            [place]



             



             

 

 Acute pain  Active    Problem  2018    [facility]



             

 

 Asthma  Resolved    Problem  2018    [facility]



             

 

 Bronchitis  Resolved    Problem  2018    [facility]



             

 

 Cerebral palsy  Resolved    Problem  2018    [facility]



             

 

 Headache  Active    Problem  2018    [facility]



             

 

 Hydrocephalus  Resolved    Problem  2018    [facility]



             

 

 Osteomyelitis  Resolved    Problem  2018    [facility] 



             Pearlmary beth -



             Nick,M



             [facility]



             

 

 HEADACHE  Active          [facility]



             







Medications







 Medication  Details  Route  Status  Patient  Ordering  Order  Source



         Instructions  Provider  Date  



               



               



               

 

 Docusate Sodium  100 mg=1 cap,    Active         [place]



 100 MG Oral  PO, BID, 0            [facility]



 Capsule  Refill(s)            [facility]



               



               

 

 Zosyn  0 Refill(s)    Active        [facility] [place]



               [facility]



               



               

 

 celecoxib 200  200 mg=1 cap,    Active      [facility] [place]



 mg oral capsule  PO, BID, 0          2018  [facility]



   Refill(s)            [facility]



               



               

 

 ascorbic acid  500 mg=1 tab,    Active      [facility] [place]



   PO, BID, 0            [facility]



   Refill(s)            [facility]



               



               

 

 acetaminophen  1,000 mg=2 tab,    Active      [facility] [place]



 500 mg oral  PO, Q6Hnow, 0            [facility]



 tablet  Refill(s)            [facility]



               



               

 

 Oxycodone  5 mg=1 tab, PO,    Active      [facility] [place]



 Hydrochloride 5  Q4H, PRN Pain          2018  [facility]



 MG Oral Tablet  Score 4-6, 0            Center



   Refill(s)            



               



               

 

 zinc sulfate  220 mg=1 cap,    Active      [facility] [place]



 220 mg oral  PO, Daily, 0          2018  [facility]



 capsule  Refill(s)            [facility]



               



               

 

 multivitamin  1 tab, PO,    Active      [facility] [place]



   Daily, 0          2018  [facility]



   Refill(s)            [facility]



               



               

 

 methocarbamol  1,000 mg=2 tab,    Active      [facility] [place]



 500 mg oral  PO, Q8H, 0          2018  [facility]



 tablet  Refill(s)            Center



               



               

 

 LORazepam 0.5  0.5 mg=1 tab,    Active         [place]



 mg oral tablet  PO, Q8H, PRN          2018  [facility]



   Anxiety, 0            Center



   Refill(s)            



               



               

 

 Lidocaine  3 patch, TOP,    Active         [place]



 Hydrochloride  Daily, Remove          2018  [facility]



 0.05 MG/MG  after 12 hours,            Center



 Transdermal  0 Refill(s)            



 Patch              



 [Lidoderm]              



               



               

 

 Robaxin  1,000 mg, 2    No Longer        [facility]



   tab, Route: PO,    Active      2018  [facility]



   Drug form: TAB,            Center



   Q8H, Dosing            



   Weight 127.027,            



   kg, Start date:            



   18            



   16:00:00 CDT,            



   Duration: 30            



   day, Stop date:            



   18            



   8:00:00            



   CDTNotes: (Same            



   as:Robaxin)            



               



               

 

 Oxycodone  10 mg, 2 tab,    No Longer         [place]



 Hydrochloride 5  Route: PO, Drug    Active      2018  [facility]



 MG Oral Tablet  form: TAB,            Center



   Daily, Dosing            



   Weight 127.027,            



   kg, PRN            



   Procedure,            



   Start date:            



   18            



   13:06:00 CDT,            



   Duration: 30            



   day, Stop date:            



   18            



   13:05:00            



   CDTNotes: (Same            



   as: Roxicodone)            



               



               

 

 Ativan  0.5 mg, 1 tab,    No Longer        [facility]



   Route: PO, Drug    Active      2018  [facility]



   form: TAB, Q8H,            Center



   Dosing Weight            



   127.027, kg,            



   PRN Anxiety,            



   Start date:            



   18            



   10:18:00 CDT,            



   Duration: 7            



   day, Stop date:            



   18            



   10:17:00            



   CDTNotes: (Same            



   as: Ativan)            



               



               

 

 Trazodone  50 mg, 1 tab,    No Longer        [facility] [place]



 Hydrochloride  Route: PO, Drug    Active      2018  [facility]



 50 MG Oral  form: TAB,            Center



 Tablet  Bedtime, Dosing            



   Weight 127.027,            



   kg, Start date:            



   18            



   21:00:00 CDT,            



   Duration: 30            



   day, Stop date:            



   18            



   21:00:00            



   CDTNotes: (Same            



   As: Desyrel)            



               



               

 

 remove patch  3 patch, Route:    No Longer         [place]



   TOP, Bedtime,    Active        [facility]



   Drug form:            Center



   ERFILM, Start            



   date: 18            



   21:00:00 CDT,            



   Duration: 30            



   day, Stop date:            



   18            



   21:00:00            



   CDTNotes:            



   Remove patch 12            



   hours after            



   application            



   each day.            



               



               

 

 Oxycodone  10 mg, 2 tab,    Inactive        [facility] [place]



 Hydrochloride 5  Route: PO, Drug          2018  [facility]



 MG Oral Tablet  form: TAB,            Center



   ONCE, Dosing            



   Weight 127.027,            



   kg, Start date:            



   18            



   17:01:00 CDT,            



   Stop date:            



   18            



   17:01:00            



   CDTNotes: (Same            



   as: Roxicodone)            



               



               

 

 Celebrex  200 mg, 1 cap,    No Longer        [facility]



   Route: PO, Drug    Active      2018  [facility]



   form: CAP, BID,            [facility]



   Dosing Weight            



   127.027, kg,            



   Start date:            



   18            



   17:00:00 CDT,            



   Duration: 30            



   day, Stop date:            



   18            



   9:00:00            



   CDTNotes:            



   NSAID. Please            



   check            



   indication. Not            



   for seizure.            



   (Same As:            



   CeleBREX)            



               



               

 

 Vancomycin  1,500 mg, 250    No Longer         [place]



   mL, Route:    Active      2018  [facility]



   IVPB, Drug            Center



   form: INJ,            



   [id number], Dosing            



   Weight 127.27,            



   kg, Start date:            



   18            



   16:00:00 CDT,            



   Stop date:            



   05/10/18            



   8:00:00 CDT,            



   ABX Indication:            



   Skin/Soft            



   Tissue            



   InfectionNotes:            



   TIME CRITICAL            



   MEDICATION Same            



   as: Vancocin-NS            



   (premixed)            



   Infusion rate            



   2001 mg: infuse            



   over 2.5 hours            



               



               

 

 Lidocaine  3 patch, Route:    No Longer        [facility] [place]



 Hydrochloride  TOP, Daily,    Active        [facility]



 0.05 MG/MG  Drug form:            [facility]



 Transdermal  FILM, Start            



 Patch  date: 18            



 [Lidoderm]  9:00:00 CDT,            



   Duration: 7            



   day, Stop date:            



   18            



   9:00:00 CDT,            



   Remove after 12            



   hoursNotes:            



   Apply only once            



   for up to 12            



   hours in a            



   24-hour period            



   (12 hours on            



   and 12 hours            



   off). (Same as:            



   Lidoderm)            



   "Remove old            



   patch before            



   application of            



   new patch"            



               



               

 

 Phenergan  12.5 mg, 0.5    Inactive         [place]



   mL, Route:          2018  [facility]



   IVPB, Drug            Center



   form: INJ,            



   ONCE, Dosing            



   Weight 127.027,            



   kg, Priority:            



   NOW, Start            



   date: 18            



   17:40:00 CDT,            



   Stop date:            



   18            



   17:40:00            



   CDTNotes: Do            



   not give IV            



   push.  (Same            



   as: Phenergan)            



               



               

 

 Dilaudid  0.5 mg, 0.25    Inactive       [place]



   mL, Route: IVP,            Medical



   Drug form: INJ,            Center



   ONCE, Dosing            



   Weight 127.027,            



   kg, Priority:            



   NOW, Start            



   date: 18            



   17:40:00 CDT,            



   Stop date:            



   18            



   17:40:00            



   CDTNotes: Same            



   as Dilaudid            



               



               

 

 Tramadol  100 mg, 2 tab,    No Longer        [facility]



   Route: PO, Drug    Active        [facility]



   form: TAB,            Center



   Q6Hnow, Dosing            



   Weight 127.027,            



   kg, Start date:            



   18            



   17:00:00 CDT,            



   Duration: 30            



   day, Stop date:            



   18            



   11:00:00            



   CDTNotes: Not            



   to exceed            



   400mg/day.            



   (Same As:            



   Ultram)            



               

 

 gabapentin  600 mg, 2 cap,    No Longer        [facility]



   Route: PO, Drug    Active        [facility]



   form: CAP,            Center



   Q8Hnow, Dosing            



   Weight 127.027,            



   kg, Start date:            



   18            



   17:00:00 CDT,            



   Stop date:            



   18            



   9:00:00            



   CDTNotes: (Same            



   as: Neurontin)            



               



               

 

 Acetaminophen  1,000 mg, 2    No Longer        [facility]



   tab, Route: PO,    Active        [facility]



   Drug form: TAB,            Center



   Q6Hnow, Dosing            



   Weight 127.027,            



   kg, Start date:            



   18            



   17:00:00 CDT,            



   Duration: 30            



   day, Stop date:            



   18            



   11:00:00            



   CDTNotes: Max            



   acetaminophen            



   4000 mg/day (4            



   gm/day).  (Same            



   as: Tylenol            



   Extra Strength)            



               



               

 

 Robaxin  500 mg, 1 tab,    No Longer        [facility]



   Route: PO, Drug    Active        [facility]



   form: TAB, TID,            Center



   Dosing Weight            



   127.027, kg,            



   Start date:            



   18            



   17:00:00 CDT,            



   Duration: 30            



   day, Stop date:            



   18            



   13:00:00            



   CDTNotes: (Same            



   as:Robaxin)            



               



               

 

 Oxycodone  5 mg, 1 tab,    No Longer        [facility] [place]



 Hydrochloride 5  Route: PO, Drug    Active      2018  Medical



 MG Oral Tablet  form: TAB, Q4H,            Center



   Dosing Weight            



   127.027, kg,            



   PRN Pain Score            



   4-6, Start            



   date: 18            



   16:33:00 CDT,            



   Duration: 30            



   day, Stop date:            



   18            



   16:32:00            



   CDTNotes: (Same            



   as: Roxicodone)            



               



               

 

 Beneprotein 7  2 pkt, Route:    No Longer         [place]



 gm pkt  PO, Drug Form:    Active      2018  Medical



   PWDR, Dosing            Center



   Weight 127.027,            



   kg, BID-Before            



   Meals, Start            



   date: 18            



   16:30:00 CDT,            



   Duration: 30            



   day, Stop date:            



   18            



   7:30:00            



   CDTNotes: (Same            



   as:            



   Beneprotein)            



               



               

 

 Acetaminophen  1 tab, PO, TID,    No Longer         [place]



 325 MG /  0 Refill(s)    Active      2018  Medical



 Oxycodone              Center



 Hydrochloride              



 10 MG Oral              



 Tablet              



 [Percocet              



 10/325]              



               

 

 Dilaudid  0.5 mg, 0.25    Inactive       [place]



   mL, Route: IVP,          2018  Medical



   Drug form: INJ,            Center



   ONCE, Start            



   date: 18            



   11:01:00 CDT,            



   Stop date:            



   18            



   11:01:00 CDT            



               



               

 

 Phenergan  25 mg, 1 tab,    Inactive        [facility]



   Route: PO, Drug          2018  Medical



   form: TAB, Q6H,            Center



   Dosing Weight            



   127.027, kg,            



   PRN Nausea &            



   Vomiting, Start            



   date: 18            



   10:43:00 CDT,            



   Duration: 30            



   day, Stop date:            



   18            



   10:42:00            



   CDTNotes: (Same            



   as: Phenergan)            



               



               

 

 Dilaudid  2 mg, Route:    Inactive         [place]



   IVP, ONCE,          2018  Medical



   Dosing Weight            Center



   127.027, kg,            



   Priority: STAT,            



   Start date:            



   18            



   10:43:00 CDT,            



   Stop date:            



   18            



   10:43:00 CDT            



               



               

 

 Docusate  100 mg, 1 cap,    No Longer        [facility]



   Route: PO, Drug    Active      2018  Medical



   form: CAP, BID,            Center



   Dosing Weight            



   127.27, kg,            



   Start date:            



   18            



   9:00:00 CDT,            



   Duration: 30            



   day, Stop date:            



   18            



   17:00:00            



   CDTNotes: (Same            



   as: Colace) (Do            



   Not Crush)            



               



               

 

 Zinc Sulfate  220 mg, 1 cap,    No Longer       [place]



   Route: PO, Drug    Active      2018  [facility]



   form: CAP,            Center



   Daily, Dosing            



   Weight 127.27,            



   kg, Start date:            



   18            



   9:00:00 CDT,            



   Duration: 30            



   day, Stop date:            



   18            



   9:00:00            



   CDTNotes: (Zinc            



   sulfate            



   capsule) - 220            



   mg Zinc            



   sulfate=50 mg            



   elemental zinc            



   Same as Zinc            



   Sulfate            



               

 

 ascorbic acid  500 mg, 1 tab,    No Longer       [place]



   Route: PO, Drug    Active        Medical



   form: TAB, BID,            Center



   Dosing Weight            



   127.27, kg,            



   Start date:            



   18            



   9:00:00 CDT,            



   Duration: 30            



   day, Stop date:            



   18            



   17:00:00            



   CDTNotes: (Same            



   as: Vitamin C)            



               



               

 

 multivitamin  1 tab, Route:    No Longer       [place]



   PO, Drug Form:    Active      2018  Medical



   TAB, Dosing            Center



   Weight 127.27,            



   kg, Daily,            



   Start date:            



   18            



   9:00:00 CDT,            



   Duration: 30            



   day, Stop date:            



   18            



   9:00:00            



   CDTNotes: (Same            



   as:Thera)            



   WASTE: F/P -            



   Black; E -            



   Municipal Trash            



   Bin  Take with            



   food.            



               

 

 Naproxen  500 mg, 1 tab,    Inactive       [place]



   Route: PO, Drug            Medical



   form: TAB,            Center



   [id number], Dosing            



   Weight 127.27,            



   kg, Start date:            



   18            



   2:00:00 CDT,            



   Duration: 30            



   day, Stop date:            



   18            



   14:00:00            



   CDTNotes: (Same            



   as: Naprosyn)            



   Take with food.            



               



               

 

 Zosyn  3.375 gm,    No Longer        [facility] [facility]



   Route: IVPB,    Active      2018  Medical



   Drug form:            Center



   PDR/INJ,            



   ABXQ8H, Dosing            



   Weight 127.27,            



   kg, Start date:            



   18            



   2:00:00 CDT,            



   Stop date:            



   05/10/18            



   10:00:00 CDT,            



   ABX Indication:            



   Skin/Soft            



   Tissue            



   InfectionNotes:            



   (Same as:            



   Zosyn) Dosing            



   based on            



   Piperacillin            



   component   ***            



   MEDICATION            



   WASTE ***            



   Product Size:            



   3375 mg Product            



   Wasted:  ___ mg            



               



               

 

 Vancomycin  1,000 mg,    No Longer         [place]



   Route: IVPB,    Active        Medical



   Drug form: INJ,            Center



   ABXQ8H, Dosing            



   Weight 127.27,            



   kg, Start date:            



   18            



   2:00:00 CDT,            



   Duration: 7            



   day, Stop date:            



   05/10/18            



   18:00:00 CDT,            



   ABX Indication:            



   Skin/Soft            



   Tissue            



   InfectionNotes:            



   TIME CRITICAL            



   MEDICATION            



   (Same As:            



   Vancocin)            



   Infusion rate            



   2001 mg: infuse            



   over 2.5 hours            



   For adult            



   patients only:            



   Round to            



   nearest 250 mg            



   per Medical            



   Staff approval            



    *** MEDICATION            



   WASTE ***            



   Product Size:            



   1000 mg Product            



   Wasted:  ___ mg            



               



               

 

 Enoxaparin  40 mg, 0.4 mL,    No Longer         [place]



   Route: SUB-Q,    Active        Medical



   Drug form: INJ,            Center



   [id number],            



   Dosing Weight            



   127.27, kg,            



   Consider for            



   obese patients,            



   Start date:            



   18            



   2:00:00 CDT,            



   Stop date:            



   18            



   14:00:00            



   CDTNotes: (Same            



   as: Lovenox)            



               



               

 

 Sodium Chloride  1,000 mL, Rate:    No Longer       [place]



 0.9% IV 1,000  125 ml/hr,    Active        Medical



 mL  Infuse over: 8            Center



   hr, Route: IV,            



   Dosing Weight            



   127.27 kg,            



   Total Volume:            



   1,000, Start            



   date: 18            



   1:46:00 CDT,            



   Duration: 30            



   day, Stop date:            



   18            



   1:45:00 CDT,            



   2.44, m2            



               

 

 Saline Flush  10 ml, Route:    No Longer         [place]



 0.9%  IVP, Drug Form:    Active        Medical



   INJ, Dosing            Center



   Weight 127.27,            



   kg, PRN, PRN            



   Line Flush,            



   Start date:            



   18            



   1:46:00 CDT,            



   Duration: 30            



   day, Stop date:            



   18            



   1:45:00            



   CDTNotes: (Same            



   as: BD            



   Posiflush)            



               



               

 

 Acetaminophen  325 mg, 1 tab,    Inactive         [place]



   Route: PO, Drug          2018  Medical



   form: TAB, Q4H,            Center



   Dosing Weight            



   127.27, kg, PRN            



   Pain Score 4-6,            



   Start date:            



   18            



   1:46:00 CDT,            



   Duration: 30            



   day, Stop date:            



   18            



   1:45:00            



   CDTNotes: Do            



   not exceed 4            



   gm/day.  (Same            



   as: Tylenol)            



               



               

 

 Acetaminophen  1 tab, Route:    Inactive         [place]



 325 MG /  PO, Drug Form:          2018  Medical



 Hydrocodone  TAB, Dosing            Center



 Bitartrate 5 MG  Weight 127.27,            



 Oral Tablet  kg, Q4H, PRN            



   Pain Score 4-6,            



   Start date:            



   18            



   1:46:00 CDT,            



   Duration: 30            



   day, Stop date:            



   18            



   1:45:00            



   CDTNotes: (Same            



   as: Norco            



   325/5)  Do not            



   exceed 4gm/day            



   of            



   acetaminophen.            



               



               

 

 Reglan  10 mg, 2 mL,    Inactive         [place]



   Route: IVP,            Medical



   Drug form: INJ,            Center



   ONCE, Dosing            



   Weight 127.273,            



   kg, Priority:            



   STAT, Start            



   date: 18            



   23:27:00 CDT,            



   Stop date:            



   18            



   23:27:00            



   CDTNotes: (Same            



   as: Reglan)            



               



               

 

 Benadryl  25 mg, 0.5 mL,    Inactive         [place]



   Route: IVP,          2018  Medical



   Drug form: INJ,            Center



   ONCE, Dosing            



   Weight 127.273,            



   kg, Priority:            



   STAT, Start            



   date: 18            



   23:27:00 CDT,            



   Stop date:            



   18            



   23:27:00            



   CDTNotes: (Same            



   as: Benadryl)            



               



               

 

 Magnesium  2 gm, 50 mL,    Inactive         [place]



 Sulfate  Route: IV, Drug            Medical



   form: INJ,            Center



   ONCE, Dosing            



   Weight 127.273,            



   kg, Priority:            



   STAT, Start            



   date: 18            



   23:26:00 CDT,            



   Stop date:            



   18            



   23:26:00            



   CDTNotes:            



   WASTE: F/P -            



   Sink; E -            



   Municipal Trash            



   Bin            



               

 

 Sodium Chloride  1,000 mL, 1000    Inactive        [facility] [place]



 0.9% (Bolus) IV  ml/hr, Infuse          2018  Medical



   Over: 1 hr,            Center



   Route: IV,            



   1,000, Drug            



   form: INJ,            



   ONCE, Priority:            



   STAT, Dosing            



   Weight 127.273            



   kg, Start date:            



   18            



   23:26:00 CDT,            



   Stop date:            



   18            



   23:26:00 CDT            



               



               

 

 Zosyn  4.5 gm, Route:    Inactive         [place]



   IVPB, ONCE,          2018  Medical



   Dosing Weight            Center



   127.273, kg,            



   Priority: STAT,            



   Start date:            



   18            



   23:10:00 CDT,            



   Stop date:            



   18            



   23:10:00 CDT,            



   ABX Indication:            



   Bacteremia            



               



               

 

 Vancomycin  2,000 mg,    Inactive         [place]



   Route: IVPB,          2018  Medical



   ONCE, Dosing            Center



   Weight 127.273,            



   kg, Priority:            



   STAT, Start            



   date: 18            



   23:10:00 CDT,            



   Stop date:            



   18            



   23:10:00 CDT,            



   ABX Indication:            



   BacteremiaNotes            



   : TIME CRITICAL            



   MEDICATION            



   (Same As:            



   Vancocin)            



   Infusion rate            



   2001 mg: infuse            



   over 2.5 hours            



   For adult            



   patients only:            



   Round to            



   nearest 250 mg            



   per Medical            



   Staff approval            



    *** MEDICATION            



   WASTE ***            



   Product Size:            



   1000 mg Product            



   Wasted:  ___ mg            



               



               

 

 normal saline  1,000 mL, Rate:    No Longer       [place]



 0.9% IV 1,000  75 ml/hr,    Active      2018  Medical



 mL  Infuse over:            Center



   13.3 hr, Route:            



   IV, Dosing            



   Weight 127.273            



   kg, Total            



   Volume: 1,000,            



   Start date:            



   18            



   22:39:00 CDT,            



   Duration: 30            



   day, Stop date:            



   18            



   22:38:00 CDT,            



   2.36, m2            



               

 

 Acetaminophen  1,000 mg, 2    Inactive         [place]



   tab, Route: PO,          2018  Medical



   Drug form: TAB,            Center



   ONCE, Dosing            



   Weight 127.273,            



   kg, Start date:            



   18            



   21:56:00 CDT,            



   Stop date:            



   18            



   21:56:00            



   CDTNotes: Max            



   acetaminophen            



   4000 mg/day (4            



   gm/day).  (Same            



   as: Tylenol            



   Extra Strength)            



               



               

 

 Rocephin  1 gm, Route:    Inactive      05/04[facility]



   IVP, Drug form:          2018  Medical



   PDR/INJ, ONCE,            Center



   Dosing Weight            



   127.273, kg,            



   Priority: STAT,            



   Start date:            



   18            



   21:21:00 CDT,            



   Stop date:            



   18            



   21:21:00 CDT,            



   ABX Indication:            



   Urinary Tract            



   InfectionNotes:            



   (Same As:            



   Rocephin).            



   *** MEDICATION            



   WASTE ***            



   Product Size:            



   1000 mg Product            



   Wasted:  _0__            



   mg            



               

 

 Morphine  4 mg, Route:    Inactive        [facility]



   IVP, ONCE,          2018  Medical



   Dosing Weight            Center



   127.273, kg,            



   Priority: STAT,            



   Start date:            



   18            



   19:05:00 CDT,            



   Stop date:            



   18            



   19:05:00 CDT            



               



               

 

 Benadryl  25 mg, 0.5 mL,    Inactive        [facility]



   Route: IVP,            Medical



   Drug form: INJ,            Center



   ONCE, Dosing            



   Weight 127.273,            



   kg, Priority:            



   STAT, Start            



   date: 18            



   18:14:00 CDT,            



   Stop date:            



   18            



   18:14:00            



   CDTNotes: (Same            



   as: Benadryl)            



               



               

 

 Benadryl  50 mg, 2 cap,    Inactive      [facility]



   Route: PO, Drug            Medical



   form: CAP,            Center



   ONCE, Dosing            



   Weight 127.273,            



   kg, Priority:            



   STAT, Start            



   date: 18            



   17:56:00 CDT,            



   Stop date:            



   18            



   17:56:00            



   CDTNotes: (Same            



   as: Benadryl)            



               



               

 

 Morphine  4 mg, 1 mL,    Inactive      [facility]



   Route: IVP,            Medical



   Drug form:            Center



   SOLN, ONCE,            



   Dosing Weight            



   127.273, kg,            



   Priority: STAT,            



   Start date:            



   18            



   17:56:00 CDT,            



   Stop date:            



   18            



   17:56:00 CDT            



               



               

 

 Pantoprazole  DAILY AT 0630    Active    [facility]



             2016  [facility]



               [place]



               



               

 

 Metronidazole  Q8H    Active    [facility]



             2016  [facility]



               [place]



               



               

 

 Codeine/Apap  EVERY 6 HOURS    Active    [facility]



   AS NEEDED PRN            [facility]



   For [facility]            [place]



               



               

 

 Oxycodone  FOUR TIMES    Active        [facility]



 Hcl/Acetaminoph  DAILY          2016  [facility]



 [facility]              [place]



               



               

 

 Meropenem  Q8H    Active        [facility]



               [facility]



               [place]



               



               

 

 Collagenase  DAILY    Active    Granda    [facility]



               [facility]



               [place]



               



               

 

 Ciprofloxacin  Q12H    Active    Todd  09/10/  [facility]



 400mg Iv              [facility]



               [place]



               



               

 

 Amikacin Sulf  DAILY    Active    Todd  09/10/  [facility]



               [facility]



               [place]



               



               

 

 Linezolid  TWICE DAILY    Inactive    Granda    [facility]



               [facility]



               [place]



               



               







Allergies, Adverse Reactions, Alerts







 Substance  Category  Reaction  Severity  Reaction  Status  Date  Comments  
Source



         type    Reported    



                 



                 



                 

 

 levofloxacin    Hives  Moderate  Allergy to  Active      [facility]



         Substance    8    [facility]



                 [place]



                 t



                 



                 

 

 sulfamethoxa    Hives  Moderate  Allergy to  Active      [facility]



 zole        Substance    8    [facility]



                 [place]



                 t



                 



                 

 

 ketorolac    Nausea/Vo    Allergy to  Active      [facility]



 tromethamine    miting    Substance    8    [facility]



                 t



                 



                 

 

 vancomycin    Hives    Allergy to  Active      [facility]



         Substance    8    [facility]



                 [place]



                 t



                 



                 

 

 ondansetron    Nausea/Vo  Mild  Propensity  Active      [facility]



     miting    to adverse    8    [facility]



         [facility]        [place]



                 [facility]



                 



                 

 

 ciprofloxaci    Nausea/Vo    Propensity  Active      [facility]



 n    miting    to adverse    8    [facility]



         [facility]        [place]



                 t



                 



                 

 

 doxycycline    Nausea/Vo    Propensity  Active      [facility]



     miting    to adverse    8    [facility]



         [facility]        [place]



                 t



                 



                 

 

 morphine  Assertion      Drug  Active      [facility]



                 



                 

 

 amoxicillin  Assertion      Drug  Active      [facility]



                 



                 

 

 Toradol  Assertion      Drug  Active      [facility]



                 



                 

 

 Minocin  Assertion      Drug  Active      [facility]



                 



                 

 

 Zofran  Assertion      Drug  Active      [facility]



                 



                 

 

 Levaquin  Assertion      Drug  Active      [facility]



                 



                 

 

 Bactrim  Assertion      Drug  Active      [facility]



                 



                 







Immunizations







 Immunization  Date Given  Site  Status  Last Updated  Comments  Source



             



             







Results







 Order Name  Results  Value  Reference  Date  Interpretation  Comments  Source



       [facility]        



               



               



               

 

 Brain-Outs  Brain-Outsid  EXAM: CT BRAIN WITHOUT CONTRAST -- OUTSIDE CONSULT  
      -  [facility]



 jonathan  e Consult     -  Medical



 Consult CT            This report was dictated by a Radiology Resident/Fellow.
  I have personally reviewed the images as  Center



             well as the Resident's interpretation and agree with the findings.
  



     DATE: 2018 4:49 AM CST        Read by:  Mauricio Cortes MD  
        Resident:  Mauricio Cortes MD  



             Dictated Date/time:  18 04:58  



             Electronically Signed by:  Radha Addison MD              
   18 07:46  



             FINAL REPORT  



     INDICATION: " - PAIN"          



               



               



               



     COMPARISON: Noncontrast head CTs 2018, 2015, 2013       
   



               



               



               



     TECHNIQUE: Noncontrast outside hospital CT submitted for 2nd 
interpretation. 205 images. Imaging was performed at [facility] on 2018.          



               



     IV contrast: None.          



               



               



               



     FINDINGS:          



               



               



               



     Overall unchanged exam. Right transfrontal ventriculostomy catheter is 
unchanged in position. 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 
size and configuration. The abandoned right transparie          



     hudson catheter is unchanged. Stigmata of Chiari II malformation.          



               



               



               



     There is no intracranial hemorrhage or extra-axial collection.          



               



               



               



     No new parenchymal density abnormality. No mass or mass effect. Unchanged 
from the tonsillar herniation.          



               



               



               



     The density of the larger intracranial sinuses is normal.          



               



               



               



               



               



     IMPRESSION: Unchanged examination compared to 2018          



               



               



               



     The final report agrees with the preliminary report by the radiology 
resident.          



               



               



               



               

 

 CHEM PANEL  B/C Ratio  17  6 - 25        [address]



               



               



               

 

 CHEM PANEL  Globulin  4.3 g/dL  2.7 - 4.2        [address]



               



               



               

 

 CHEM PANEL  A/G Ratio  0.7  0.7 - 1.6        [address]



               



               



               

 

 CHEM PANEL  AGAP  14.4 meq/L  10.0 -        [facility]



       20.0        [facility]



               



               



               

 

 CHEM PANEL  eGFR  113        Result Comment: The eGFR is calculated using 
the CKD-EPI formula. In most young, healthy individuals the eGFR will be >90 mL/
min/1.73m2. The eGFR declines with age. An eGFR of 60-89 may be normal in  [facility] 
[place]



     mL/min/1.7        some populations, particularly the elderly, for 
whom the CKD-EPI formula has not been extensively validated. Use of the eGFR is 
not recommended in the following populations:  [address]



             Individuals with unstable creatinine concentrations, including 
pregnant patients and those with serious co-morbid conditions.  



               



             Patients with extremes in muscle mass or diet.  



               



             The data above are obtained from the National Kidney Disease 
Education Program (NKDEP) which additionally recommends that when the eGFR is 
used in patients with extremes of body mass index for purposes  



             of drug dosing, the eGFR should be multiplied by the estimated 
BMI.  

 

 CHEM PANEL  Alk Phos  76 unit/L  39 - 136  05/      [address]



               



               



               

 

 CHEM PANEL  ALT  35 unit/L  0 - 65  /      [address]



               



               



               

 

 CHEM PANEL  Albumin Lvl  2.8 g/dL  3.5 - 5.0  /      [address]



               



               



               

 

 CHEM PANEL  Total  7.1 g/dL  6.4 - 8.4        [facility]



   Protein      [address]



               



               



               

 

 CHEM PANEL  Calcium Lvl  8.7 mg/dL  8.5 - 10.5        [address]



               



               



               

 

 CHEM PANEL  AST  18 unit/L  0 - 37  /      [address]



               



               



               

 

 CHEM PANEL  Bili Total  0.3 mg/dL  0.2 - 1.3        [address]



               



               



               

 

 CHEM PANEL  Potassium  4.4 meq/L  3.5 - 5.1        [facility]      [address] [place] [address]



               



               



               

 

 CHEM PANEL  Chloride Lvl  109 meq/L  95 - 109        [address]



               



               



               

 

 CHEM PANEL  CO2  23 meq/L  24 - 32  /      [address]



               



               



               

 

 CHEM PANEL  Glucose Lvl  114 mg/dL  70 - 99        [address]



               



               



               

 

 CHEM PANEL  Creatinine  1.01 mg/dL  0.50 -        [facility]    1.40  /[address] [place] [address]



               



               



               

 

 CHEM PANEL  BUN  17 mg/dL  7 - 22        [address]



               



               



               

 

 CHEM PANEL  Sodium Lvl  142 meq/L  135 - 145        [address]



               



               



               

 

 HEMATOLOGY  Basophils  0.6 %  0.0 - 1.0  /      [address]



               



               



               

 

 HEMATOLOGY  Segs-Bands #  4.8 K/CMM  1.5 - 8.1        [address]



               



               



               

 

 HEMATOLOGY  Monocytes #  0.7 K/CMM  0.0 - 0.8  /      [address]



               



               



               

 

 HEMATOLOGY  Lymphocytes  1.9 K/CMM  1.0 - 5.5  /      [address]



               



               



               

 

 HEMATOLOGY  Monocytes  9.4 %  2.0 - 12.0  /      [address]



               



               



               

 

 HEMATOLOGY  Eosinophils  0.2 K/CMM  0.0 - 0.5        [facility]



   #      /      [facility]



               



               



               

 

 HEMATOLOGY  Eosinophils  2.9 %  0.0 - 4.0         [place]



         /2018      [facility]



               



               



               

 

 HEMATOLOGY  Segs  62.2 %  45.0 -         [place]



       75.0        [facility]



               



               



               

 

 HEMATOLOGY  Lymphocytes  24.9 %  20.0 -         [place]



       40.0        [facility]



               



               



               

 

 HEMATOLOGY  MCH  27.5 pg  27.0 -         [place]



       31.0        [facility]



               



               



               

 

 HEMATOLOGY  MCV  85.3 fL  80.0 -         [place]



       94.0        [facility]



               



               



               

 

 HEMATOLOGY  Hct  43.9 %  42.0 -         [place]



       54.0        [facility]



               



               



               

 

 HEMATOLOGY  Hgb  14.2 g/dL  14.0 -         [place]



       18.0        [facility]



               



               



               

 

 HEMATOLOGY  WBC  7.7 K/CMM  3.7 - 10.4         [place]



         /2018      [facility]



               



               



               

 

 HEMATOLOGY  RBC  5.15 M/CMM  4.70 -         [place]



       6.10        [facility]



               



               



               

 

 HEMATOLOGY  MPV  8.4 fL  7.4 - 10.4         [place]



         /2018      [facility]



               



               



               

 

 HEMATOLOGY  MCHC  32.3 g/dL  32.0 -         [place]



       36.0        [facility]



               



               



               

 

 HEMATOLOGY  RDW  17.3 %  11.5 -         [place]



       14.5        [facility]



               



               



               

 

 HEMATOLOGY  Platelet  317 K/CMM  133 - 450         [place]



         [address]



               



               



               

 

 CHEM PANEL  Globulin  4.4 g/dL  2.7 - 4.2         [place]



               [facility]



               



               



               

 

 CHEM PANEL  A/G Ratio  0.6  0.7 - 1.6         [place]



         /2018      [facility]



               



               



               

 

 CHEM PANEL  B/C Ratio  17  6 - 25         [place]



         /2018      [facility]



               



               



               

 

 CHEM PANEL  AGAP  11.3 meq/L  10.0 -         [place]



       20.0        [facility]



               



               



               

 

 CHEM PANEL  eGFR  134        Result Comment: The eGFR is calculated using 
the CKD-EPI formula. In most young, healthy individuals the eGFR will be >90 mL/
min/1.73m2. The eGFR declines with age. An eGFR of 60-89 may be normal in  [facility] 
[place]



     mL/min/1.7        some populations, particularly the elderly, for 
whom the CKD-EPI formula has not been extensively validated. Use of the eGFR is 
not recommended in the following populations:  [address]



             Individuals with unstable creatinine concentrations, including 
pregnant patients and those with serious co-morbid conditions.  



               



             Patients with extremes in muscle mass or diet.  



               



             The data above are obtained from the National Kidney Disease 
Education Program (NKDEP) which additionally recommends that when the eGFR is 
used in patients with extremes of body mass index for purposes  



             of drug dosing, the eGFR should be multiplied by the estimated 
BMI.  

 

 CHEM PANEL  Creatinine  0.84 mg/dL  0.50 -        [facility]



   Lvl    1.40        [facility]



               



               



               

 

 CHEM PANEL  Sodium Lvl  142 meq/L  135 - 145        [address]



               



               



               

 

 CHEM PANEL  Glucose Lvl  99 mg/dL  70 - 99        [address]



               



               



               

 

 CHEM PANEL  BUN  14 mg/dL  7 - 22        [address]



               



               



               

 

 CHEM PANEL  Alk Phos  79 unit/L  39 - 136        [address]



               



               



               

 

 CHEM PANEL  Bili Total  0.3 mg/dL  0.2 - 1.3        [address]



               



               



               

 

 CHEM PANEL  AST  14 unit/L  0 - 37        [address]



               



               



               

 

 CHEM PANEL  ALT  43 unit/L  0 - 65        [address]



               



               



               

 

 CHEM PANEL  Total  7.1 g/dL  6.4 - 8.4        [facility]



   Protein      [address]



               



               



               

 

 CHEM PANEL  Albumin Lvl  2.7 g/dL  3.5 - 5.0        [address]



               



               



               

 

 CHEM PANEL  Calcium Lvl  9.2 mg/dL  8.5 - 10.5        [address]



               



               



               

 

 CHEM PANEL  CO2  21 meq/L  24 - 32        [address]



               



               



               

 

 CHEM PANEL  Potassium  4.3 meq/L  3.5 - 5.1        [facility]



   Lvl      [address] [place] [address]



               



               



               

 

 CHEM PANEL  Chloride Lvl  114 meq/L  95 - 109        [address]



               



               



               

 

 HEMATOLOGY  MCHC  32.5 g/dL  32.0 -        [facility]



       36.0        [facility]



               



               



               

 

 HEMATOLOGY  RDW  17.5 %  11.5 -        [facility]



       14.5        [facility]



               



               



               

 

 HEMATOLOGY  Platelet  400 K/CMM  133 - 450        [address]



               



               



               

 

 HEMATOLOGY  MPV  8.5 fL  7.4 - 10.4        [facility] [place]



         /[address] [place] [address]



               



               



               

 

 HEMATOLOGY  WBC  6.6 K/CMM  3.7 - 10.4         [place]



         /[address] [place] [address]



               



               



               

 

 HEMATOLOGY  RBC  5.17 M/CMM  4.70 -        [facility]



       6.10        [facility]



               



               



               

 

 HEMATOLOGY  MCV  86.1 fL  80.0 -        [facility]



       94.0        [facility]



               



               



               

 

 HEMATOLOGY  Hct  44.5 %  42.0 -        [facility]



       54.0        [facility]



               



               



               

 

 HEMATOLOGY  MCH  28.0 pg  27.0 -        [facility]



       31.0        [facility]



               



               



               

 

 HEMATOLOGY  Hgb  14.5 g/dL  14.0 -        [facility]



       18.0        [facility]



               



               



               

 

 HEMATOLOGY  Lymphocytes  1.7 K/CMM  1.0 - 5.5        [facility]            [facility]



               



               



               

 

 HEMATOLOGY  Monocytes #  0.7 K/CMM  0.0 - 0.8        [address]



               



               



               

 

 HEMATOLOGY  Eosinophils  0.2 K/CMM  0.0 - 0.5        [facility]            [facility]



               



               



               

 

 HEMATOLOGY  Lymphocytes  26.1 %  20.0 -        [facility]



       40.0        [facility]



               



               



               

 

 HEMATOLOGY  Segs  59.3 %  45.0 -        [facility]



       75.0        [facility]



               



               



               

 

 HEMATOLOGY  Basophils  0.8 %  0.0 - 1.0        [address]



               



               



               

 

 HEMATOLOGY  Monocytes  10.5 %  2.0 - 12.0        [address]



               



               



               

 

 HEMATOLOGY  Eosinophils  3.3 %  0.0 - 4.0        [address]



               



               



               

 

 HEMATOLOGY  Segs-Bands #  3.9 K/CMM  1.5 - 8.1        [address]



               



               



               

 

 TOXICOLOGY  Vanco Tr TND  15:30pm          [address]



               



               



               

 

 TOXICOLOGY  Vanco Tr  9.1 ug/ml          [address]



               



               



               

 

 CHEM PANEL  Glucose Lvl  74 mg/dL  70 - 99        [address]



               



               



               

 

 CHEM PANEL  BUN  12 mg/dL  7 - 22        [address]



               



               



               

 

 CHEM PANEL  Creatinine  0.75 mg/dL  0.50 -        [facility]



   Lvl    1.40        [facility]



               



               



               

 

 CHEM PANEL  eGFR  140        Result Comment: The eGFR is calculated using 
the CKD-EPI formula. In most young, healthy individuals the eGFR will be >90 mL/
min/1.73m2. The eGFR declines with age. An eGFR of 60-89 may be normal in  [facility] 
[place]



     mL/min/1.    some populations, particularly the elderly, for 
whom the CKD-EPI formula has not been extensively validated. Use of the eGFR is 
not recommended in the following populations:  [address]



             Individuals with unstable creatinine concentrations, including 
pregnant patients and those with serious co-morbid conditions.  



               



             Patients with extremes in muscle mass or diet.  



               



             The data above are obtained from the National Kidney Disease 
Education Program (NKDEP) which additionally recommends that when the eGFR is 
used in patients with extremes of body mass index for purposes  



             of drug dosing, the eGFR should be multiplied by the estimated 
BMI.  

 

 CHEM PANEL  Albumin Lvl  2.9 g/dL  3.5 - 5.0        [address]



               



               



               

 

 CHEM PANEL  Globulin  4.4 g/dL  2.7 - 4.2        [address]



               



               



               

 

 CHEM PANEL  A/G Ratio  0.7  0.7 - 1.6        [address]



               



               



               

 

 CHEM PANEL  Bili Total  0.4 mg/dL  0.2 - 1.3        [address]



               



               



               

 

 CHEM PANEL  Alk Phos  71 unit/L  39 - 136        [address]



               



               



               

 

 CHEM PANEL  AST  15 unit/L  0 - 37        [address]



               



               



               

 

 CHEM PANEL  ALT  28 unit/L  0 - 65        [address]



               



               



               

 

 CHEM PANEL  Potassium  4.4 meq/L  3.5 - 5.1        [facility]      [address] [place] [address]



               



               



               

 

 CHEM PANEL  Sodium Lvl  147 meq/L  135 - 145        [address]



               



               



               

 

 CHEM PANEL  CO2  25 meq/L  24 - 32        [address]



               



               



               

 

 CHEM PANEL  Chloride Lvl  114 meq/L  95 - 109  /      [address]



               



               



               

 

 CHEM PANEL  B/C Ratio  16  6 - 25        [address]



               



               



               

 

 CHEM PANEL  Calcium Lvl  8.7 mg/dL  8.5 - 10.5        [address]



               



               



               

 

 CHEM PANEL  AGAP  12.4 meq/L  10.0 -  05/      [facility]



       20.0        [facility]



               



               



               

 

 CHEM PANEL  Total  7.3 g/dL  6.4 - 8.4        [facility] [place]



   Protein      [address]



               



               



               

 

 HEMATOLOGY  Platelet  345 K/CMM  133 - 450  05       [place]



         /2018      [facility]



               



               



               

 

 HEMATOLOGY  MPV  8.7 fL  7.4 - 10.4         [place]



         /2018      [facility]



               



               



               

 

 HEMATOLOGY  WBC  6.9 K/CMM  3.7 - 10.4         [place]



         /2018      [facility]



               



               



               

 

 HEMATOLOGY  RBC  5.30 M/CMM  4.70 -         [place]



       6.10        [facility]



               



               



               

 

 HEMATOLOGY  MCHC  32.8 g/dL  32.0 -         [place]



       36.0        [facility]



               



               



               

 

 HEMATOLOGY  MCH  27.9 pg  27.0 -         [place]



       31.0        [facility]



               



               



               

 

 HEMATOLOGY  Hgb  14.8 g/dL  14.0 -         [place]



       18.0        [facility]



               



               



               

 

 HEMATOLOGY  MCV  85.0 fL  80.0 -        [facility]



       94.0        [facility]



               



               



               

 

 HEMATOLOGY  Hct  45.1 %  42.0 -         [place]



       54.0        [facility]



               



               



               

 

 HEMATOLOGY  RDW  17.5 %  11.5 -         [place]



       14.5        [facility]



               



               



               

 

 HEMATOLOGY  Eosinophils  0.2 K/CMM  0.0 - 0.5        [facility]



   #            [facility]



               



               



               

 

 HEMATOLOGY  Lymphocytes  2.0 K/CMM  1.0 - 5.5        [facility]



         [facility]



               



               



               

 

 HEMATOLOGY  Monocytes #  0.7 K/CMM  0.0 - 0.8  05       [place]



         /2018      [facility]



               



               



               

 

 HEMATOLOGY  Eosinophils  2.4 %  0.0 - 4.0         [place]



         /2018      [facility]



               



               



               

 

 HEMATOLOGY  Basophils  0.6 %  0.0 - 1.0         [place]



         /[address] [place] [address]



               



               



               

 

 HEMATOLOGY  Segs-Bands #  4.0 K/CMM  1.5 - 8.1         [place]



         /2018      [facility]



               



               



               

 

 HEMATOLOGY  Monocytes  10.4 %  2.0 - 12.0         [place]



         /2018      [facility]



               



               



               

 

 HEMATOLOGY  RBC Morph  Normal           [place]



         /2018      [facility]



     (18 2:07 AM)          [facility]



               



               



               

 

 HEMATOLOGY  Segs  58.1 %  45.0 -         [place]



       75.0        [facility]



               



               



               

 

 HEMATOLOGY  Plt Morph  Normal           [place]



         /2018      [facility]



     (18 2:07 AM)          [facility]



               



               



               

 

 HEMATOLOGY  Lymphocytes  28.5 %  20.0 -         [place]



       40.0        [facility]



               



               



               

 

 HEMATOLOGY  Basophils #  0.1 K/CMM  0.0 - 0.2        [facility]



               [facility]



               



               



               

 

 HEMATOLOGY  Polychrom  Moderate  None Seen        [facility]



               Medical



     *ABN*          [facility]



               



     (18 11:07 AM)          



               

 

 TOXICOLOGY  Vanco Tr TND  *          [address]



               



               



               

 

 TOXICOLOGY  Vanco Tr  22.3 ug/ml          [address]



               



               



               

 

 CHEM PANEL  Magnesium  2.3 mg/dL  1.8 - 2.4        [facility]            [facility]



               



               



               

 

 CHEM PANEL  Phosphorus  3.5 mg/dL  2.5 - 4.5        [address]



               



               



               

 

 HEMATOLOGY  PT  14.0 s  12.0 -        [facility]



       14.      [facility]



               



               



               

 

 HEMATOLOGY  PTT  37.6 s  22.9 -        [facility]



       35.      [facility]



               



               



               

 

 HEMATOLOGY  INR  1.08  0.85 -        [facility]



       1.      [facility]



               



               



               

 

 IMMUNOLOGY  C-REACTIVE  13.1 mg/L  <=2.9 mg/L        [facility]



   PROTEIN            [facility]



               



               



               

 

 IMMUNOLOGY  Prealbumin  25.2 mg/dL  18.0 -        [facility]



       45.0        [facility]



               



               



               

 

 CHEM PANEL  Lactic Acid  0.9 mMol/L  0.5 - 2.2        [facility]            [facility]



               



               



               

 

 HEMATOLOGY  Sed Rate  5 mm/h  0 - 15        [address]



               



               



               

 

 IMMUNOLOGY  C-REACTIVE  15.8 mg/L  <=2.9 mg/L        [facility]



   PROTEIN      [address]



               



               



               

 

 HEMATOLOGY  PT  13.0 s  12.0 -        [facility]



       14.      [facility]



               



               



               

 

 HEMATOLOGY  INR  0.98  0.85 -        [facility]



       1.      [facility]



               



               



               

 

 HEMATOLOGY  PTT  33.2 s  22.9 -        [facility]



       35.      [facility]



               



               



               

 

 BLOOD BANK  Antibody  Negative          [facility]



 RESULTS  Scrn            [facility]



     (5/3/18 6:51 PM)          [facility]



               



               



               

 

 BLOOD BANK  ABO/Rh  AB POS          [facility]



 RESULTS        [address] [place] [address]



               



               



               

 

 URINE AND  UA  0.2 EU/dL  0.1 - 1.0        [facility]



 STOOL  Urobilinogen      /[address] [place] [address]



               



               



               

 

 URINE AND  UA Nitrite  Negative  Negative        [facility]



 STOOL              [facility]



     (5/3/18 6:35 PM)          [facility]



               



               



               

 

 URINE AND  UA Glucose  Negative  Negative        [facility]              [facility]



     (5/3/18 6:35 PM)          [facility]



               



               



               

 

 URINE AND  UA Ketones  Negative  Negative        [place] [address]      Medical



     *NA*          Center



               



     (5/3/18 6:35 PM)          



               

 

 URINE AND  UA Bili  Negative  Negative        [facility]        2018      Medical



     *NA*          [facility]



               



     (5/3/18 6:35 PM)          



               

 

 URINE AND  UA Blood  Trace  Negative        [facility]              Medical



     *ABN*          [facility]



               



     (5/3/18 6:35 PM)          



               

 

 URINE AND  UA Leuk Est  Small  Negative        [facility]              Medical



     *ABN*          [facility]



               



     (5/3/18 6:35 PM)          



               

 

 URINE AND  UA Spec Grav  1.020  <=1.030        [address]



               



               



               

 

 URINE AND  UA pH  6.0  5.0 - 8.0        [address]



               



               



               

 

 URINE AND  UA Color  Yellow  Yellow        [facility]        2018      Medical



     *NA*          [facility]



               



     (5/3/18 6:35 PM)          



               

 

 URINE AND  UA Protein  Trace  Negative        [facility]              Medical



     *ABN*          [facility]



               



     (5/3/18 6:35 PM)          



               

 

 URINE AND  UA Turbidity  Slight Cloudy  Clear        [address]



     (5/3/18 6:35 PM)          [facility]



               



               



               

 

 URINE AND  UA Hyal Cast  0-2  0 - 2        [address]



     (5/3/18 6:35 PM)          [facility]



               



               



               

 

 URINE AND  UA Bacteria  Occasional  None Seen        [facility]    /HPF  /HPF  /      [facility]



               



               



               

 

 URINE AND  UA RBC  11-20 /HPF  0 - 2        [address]



               



               



               

 

 URINE AND  UA Sq Epi  Occasional  Few /LPF        [facility]    /LPF    /[address] [place] [address]



               



               



               

 

 URINE AND  UA WBC    None Seen        [facility]    /HPF  /HPF  /[address] [place] [address]



               



               



               

 

 HEMATOLOGY  Basophils #  0.1 K/CMM  0.0 - 0.2        [address]



               



               



               

 

 HEMATOLOGY  Polychrom  Slight          [address]



               



               



               

 

 HEMATOLOGY  Plt Morph  Normal          [address]



     (5/3/18 6:20 PM)          [facility]



               



               



               

 

 Brain  Brain shunt  EXAM: SKULL 2 VIEWS        -  [facility]



 shunt  series DX      /    -  Medical



 series DX    EXAM: CHEST 2 VIEWS        This report was dictated by a 
Radiology Resident/Fellow.  I have personally reviewed the images as  Center



             well as the Resident's interpretation and agree with the findings.
  



     EXAM: ABDOMEN 2 VIEWS        Read by:  Bernardo Lorenz MD        
  Resident:  Bernardo Lorenz MD  



             Dictated Date/time:  18 20:33  



             Electronically Signed by:  Martin Phillips MD                      
   18 22:11  



             FINAL REPORT  



     DATE: 5/3/2018 7:20 PM CDT          



               



               



               



     INDICATION: Headache. - Please include Codman view for shunt setting.     
     



               



               



               



     COMPARISON: Brain shuntogram 2013          



               



               



               



     TECHNIQUE: AP and lateral views of the skull, chest and abdomen.          



               



               



               



     FINDINGS:          



               



     There is a single intact shunt tube with the proximal aspect in the right 
frontal calvarium coursing across midline to the left anterior chest and 
abdomen with the tip coiled within the pelvis.          



               



               



               



     A right parietal shunt with distal tip in the right lateral abdomen is 
discontinuous. Another shunt present with proximal tip in the right neck base 
and distal tip in the medial mid abdomen          



               



     Cholecystectomy clips are noted. The lungs are clear but underinflated. 
Appearance of the pelvis is unchanged with bilateral shallow acetabular roofs. 
No obvious posterior dislocation. Spinal dysraphism          



      is seen with absence of the spinous process from L3 to S1.          



               



               



               



     IMPRESSION:          



               



               



               



     1. No significant change. The right transfrontal shunt catheter is intact 
along its course with the distal tip in the pelvis.          



               



     2. Discontinuous right parieto-occipital catheter and 2 discontinuous 
catheters in the right chest and abdomen are unchanged.          



               



     3. Spinal dysraphism.          



               



               



               



               

 

 Brain wo  Brain wo  EXAM: CT BRAIN WITHOUT CONTRAST        -  [facility]



 contrast  contrast CT      /    -  Medical



 CT            This report was dictated by a Radiology Resident/Fellow.  I have 
personally reviewed the images as  Center



             well as the Resident's interpretation and agree with the findings.
  



     DATE: 5/3/2018 627 PM CDT        Read by:  Bernardo Lorenz MD    
      Resident:  Bernardo Lorenz MD  



             Dictated Date/time:  18 18:45  



             Electronically Signed by:  Martin Phillips MD                      
   18 21:12  



             FINAL REPORT  



     INDICATION: 32-year-old male patient with history of  shunt malfunction 
         



               



               



               



     COMPARISON: CT of the brain 2015, 2013.          



               



               



               



     TECHNIQUE: Axial CT images of the brain were obtained. Sagittal and 
coronal reformats.          



               



     IV contrast: None.          



               



               



               



     FINDINGS:          



               



     An orphaned right occipital approach  shunt is present. Also present is 
a right frontal approach  shunt with tip in the left lateral ventricle.      
    



               



     Narrowing of the lateral ventricles is present, unchanged from 2015. 
         



               



     There is mild uncal and cerebellar tonsillar herniation, also unchanged.  
        



               



     No recent hemorrhage or territorial infarct. No mass. No midline shift.   
       



               



     No scalp fluid collections.          



               



     Sinuses are clear.          



               



               



               



     IMPRESSION:          



               



     No acute intracranial abnormality.          



               



     Adequately decompressed ventricular system.          



               



               



               



               

 

 Chest  Chest 1view  EXAM: XR CHEST 1 VIEW        -  [facility]



 1view DX  DX      /    -  Medical



             This report was dictated by a Radiology Resident/Fellow.  I have 
personally reviewed the images as  Center



             well as the Resident's interpretation and agree with the findings.
  



     DATE: 5/3/2018 6:12 PM CDT        Read by:  Olvin Palacio MD          
       Resident:  Olvin Palacio MD  



             Dictated Date/time:  18 18:31  



             Electronically Signed by:  Bakari Interiano MD              
   18 19:29  



             FINAL REPORT  



     INDICATION:  - picc line use          



               



               



               



     UT SECTION: ER          



               



               



               



     COMPARISON: None.          



               



               



               



     TECHNIQUE: AP chest          



               



               



               



     FINDINGS:          



               



     Lines, tubes and hardware:          



               



     Left PICC tip at mid SVC.          



               



               



               



     Lungs and pleura: No pulmonary or pleural based abnormality is identified. 
Pulmonary vascularity is normal.          



               



               



               



     Heart and mediastinum: The heart size is normal for technique. The 
mediastinal contours are normal.          



               



               



               



     Bones: No acute bony abnormality is identified.          



               



               



               



     Upper abdomen: Normal.          



               



               



               



               



               



     IMPRESSION:          



               



     Left PICC tip at mid SVC.          



               



               



               



     No acute cardiopulmonary abnormality is observed.          



               



               



               



               

 

 Laboratory  Sodium Level  142 mEq/L  135 - 145        [facility]



 Studies        /      [facility]



               [place]



               



               



               

 

 Laboratory  Potassium  4.4 mEq/L  3.6 - 5.0        [facility]



 Studies  Level      /      [facility]



               [place]



               



               



               

 

 Laboratory  Magnesium  1.8 mg/dL  1.8 - 2.5        [facility]



 Studies  Level      /      [facility]



               [place]



               



               



               

 

 Laboratory  Glucose  126 mg/dL  65 - 120        [facility]



 Studies  Level      /      [facility]



               [place]



               



               



               

 

 Laboratory  Estimat  null  90        [facility]



 Studies  Glomerular      /      [facility]



   Filtration            [place]



   Rate            



               



               

 

 Laboratory  Creatinine  0.83 mg/dL  0.61 -        [facility]



 Studies      1.24        [facility]



               [place]



               



               



               

 

 Laboratory  Chloride  108 mEq/L  101 - 111        [facility].



 Studies  Level      /      [facility]



               [place]



               



               



               

 

 Laboratory  Carbon  27 mEq/L  21 - 31        [facility].



 Studies  Dioxide      /      [facility]



   Level            [place]



               



               



               

 

 Laboratory  Calcium  9.1 mg/dL  8.5 - 10.5        [facility].



 Studies  Level      /      [facility]



               [place]



               



               



               

 

 Laboratory  Blood Urea  15 mg/dL  6 - 20        [facility].



 Studies  Nitrogen      /      [facility]



               [place]



               



               



               

 

 Laboratory  White Blood  7.0 K/uL  4.3 - 10.9        [facility].



 Studies  Count      /      [facility]



               [place]



               



               



               

 

 Laboratory  Red Cell  17.4 %  12.1 -        [facility].



 Studies  Distribution    15.2        [facility]



   Width            [place]



               



               



               

 

 Laboratory  Red Blood  5.14 M/uL  4.33 -        [facility].



 Studies  Count    5.43        [facility]



               [place]



               



               



               

 

 Laboratory  Platelet  270 K/uL  152 - 406        [facility].



 Studies  Count      /      [facility]



               [place]



               



               



               

 

 Laboratory  Neutrophils  62.3 %  41.7 -        [facility].



 Studies  %    73.7  /      [facility]



               [place]



               



               



               

 

 Laboratory  Monocytes %  9.3 %  3.3 - 12.3        [facility].



 Studies        /      [facility]



               [place]



               



               



               

 

 Laboratory  Mean  8.3 fL  7.6 - 11.3        [facility].



 Studies  Platelet      /      [facility]



   Volume            [place]



               



               



               

 

 Laboratory  Mean  82.8 fL  80 - 100        [facility].



 Studies  Corpuscular      /      [facility]



   Volume            [place]



               



               



               

 

 Laboratory  Mean  33.4 g/dL  32.0 -        [facility].



 Studies  Corpuscular    36.0        [facility]



   Hemoglobin            [place]



   Concent            



               



               

 

 Laboratory  Mean  27.6 pg  27.0 -        [facility].



 Studies  Corpuscular    35.0  /      [facility]



   Hemoglobin            [place]



               



               



               

 

 Laboratory  Lymphocytes  24.6 %  15.3 -        [facility].



 Studies  %    44.8  /      [facility]



               [place]



               



               



               

 

 Laboratory  Hemoglobin  14.2 g/dL  13.6 -        [facility]



 Studies      17.9  /      [facility]



               [place]



               



               



               

 

 Laboratory  Hematocrit  42.6 %  39.6 -        [facility]



 Studies      49.0  /      [facility]



               [place]



               



               



               

 

 Laboratory  Eosinophils  3.3 %  0 - 4.4        [facility]



 Studies  %      /      [facility]



               [place]



               



               



               

 

 Laboratory  Basophils %  0.5 %  0 - 1.3        [facility]



 Studies        /      [facility]



               [place]



               



               



               

 

 Laboratory  Absolute  4.4 K/uL  1.8 - 8.0        [facility]



 Studies  Neutrophil      /      [facility]



               [place]



               



               



               

 

 Laboratory  Absolute  0.7 K/uL  0.1 - 1.3        [facility]



 Studies  Monocytes      /      [facility]



   (CBC)            [place]



               



               



               

 

 Laboratory  Absolute  1.7 K/uL  0.7 - 4.9        [facility]



 Studies  Lymphocytes      /      [facility]



   (CBC)            [place]



               



               



               

 

 Laboratory  Absolute  0.2 K/uL  0 - 0.5        [facility]



 Studies  Eosinophils            [facility]



   (CBC)            [place]



               



               



               

 

 Laboratory  Absolute  0.0 K/uL  0 - 0.5        [facility]



 Studies  Basophils            [facility]



   (CBC)            [place]



               



               



               

 

 Microbiolo  Providencia  Providenci          [facility]



 gy Studies  Rettgeri  a Rettgeri    /      [facility]



               [place]



               



               



               

 

 Laboratory  Urine WBC  null          [facility]



 Studies        /      [facility]



               [place]



               



               



               

 

 Laboratory  Urine  null          [facility]



 Studies  Squamous      /      [facility]



   Epithelial            [place]



   Cells            



               



               

 

 Laboratory  Urine RBC  Urine RBC          [facility]



 Studies        /      [facility]



               [place]



               



               



               

 

 Laboratory  Urine  Urine          [facility]



 Studies  Culture  Culture    /      [facility]



   Reflexed  Reflexed          [place]



               



               



               

 

 Laboratory  Urine  null          [facility]



 Studies  Bacteria      /      [facility]



               [place]



               



               



               

 

 Laboratory  Urine pH  6.5          [facility]



 Studies        /2018      [facility]



               [place]



               



               



               

 

 Laboratory  Urine  2.0 mg/dL          [facility]



 Studies  Urobilinogen      /      [facility]



               [place]



               



               



               

 

 Laboratory  Urine Total  Urine          [facility]



 Studies  Protein  Total    /      [facility]



     Protein          [place]



               



               



               

 

 Laboratory  Urine  1.020          [facility]



 Studies  Specific      /      [facility]



   Lucernemines            [place]



               



               



               

 

 Laboratory  Urine  Urine          [facility]



 Studies  Nitrite  Nitrite    /      [facility]



               [place]



               



               



               

 

 Laboratory  Urine  Urine          [facility]



 Studies  Leukocyte  Leukocyte    /      [facility]



   Esterase  Esterase          [place]



               



               



               

 

 Laboratory  Urine  Urine          [facility]



 Studies  Ketones  Ketones          [facility]



               [place]



               



               



               

 

 Laboratory  Urine  Urine          [facility]



 Studies  Glucose  Glucose          [facility]



               [place]



               



               



               

 

 Laboratory  Urine Color  Urine          [facility].



 Studies    Color    /      [facility]



               [place]



               



               



               

 

 Laboratory  Urine Blood  Urine          [facility].



 Studies    Blood          [facility]



               [place]



               



               



               

 

 Laboratory  Urine  Urine          [facility].



 Studies  Bilirubin  Bilirubin          [facility]



               [place]



               



               



               

 

 Laboratory  Urine  Urine          [facility].



 Studies  Appearance  Appearance    /      [facility]



               [place]



               



               



               

 

 Laboratory  Prothrombin  13.0  9.5 - 12.5        [facility]



 Studies  Time  SECONDS          [facility]



               [place]



               



               



               

 

 Laboratory  INR  1.10          [facility].



 Studies  Internationa      /      [facility]



   l Normalized            [place]



   Ratio            



               



               

 

 Laboratory  Total  1.9 mg/dL  0.3 - 1.2        [facility]



 Studies  Bilirubin      /      [facility]



               [place]



               



               



               

 

 Laboratory  Serum Total  7.8 g/dL  6.0 - 8.3        [facility].



 Studies  Protein      /      [facility]



               [place]



               



               



               

 

 Laboratory  Globulin  4.2 g/dL  2.3 - 3.5        [facility].



 Studies        /      [facility]



               [place]



               



               



               

 

 Laboratory  Direct  0.6 mg/dL  0 - 0.2        [facility]



 Studies  Bilirubin      /      [facility]



               [place]



               



               



               

 

 Laboratory  Aspartate  88 IU/L  10 - 42        [facility].



 Studies  Amino Transf      /      [facility]



   (AST/SGOT)            [place]



               



               



               

 

 Laboratory  Alkaline  192 IU/L  42 - 121        [facility].



 Studies  Phosphatase      /      [facility]



               [place]



               



               



               

 

 Laboratory  Albumin/Glob  0.9  1.1 - 1.8        [facility]



 Studies  ulin Ratio      /      [facility]



               [place]



               



               



               

 

 Laboratory  Albumin  3.6 g/dL  3.2 - 5.5        [facility].



 Studies        /      [facility]



               [place]



               



               



               

 

 Laboratory  Alanine  135 IU/L  10 - 60        [facility].



 Studies  Aminotransfe      /      [facility]



   rase            [place]



   (ALT/SGPT)            



               



               

 

 Laboratory  Procalcitoni  0.44 ng/mL          [facility].



 Studies  n            [facility]



               [place]



               



               



               

 

 Laboratory  B-Type  20 pg/ml          [facility].



 Studies  Natriuretic      /      [facility]



   Peptide            [place]



               



               



               

 

 Laboratory  Lipase  22 U/L  22 - 51        [facility]



 Studies              [facility]



               [place]



               



               



               

 

 Laboratory  Rapid  null          [facility]



 Studies  Troponin I            [facility]



               [place]



               



               



               

 

 Laboratory  Lactic Acid  8.6 mg/dL  4.5 - 19.8        [facility]



 Studies  Level            [facility]



               [place]



               



               



               

 

 Microbiolo  Morganella  Morganella          [facility]



 gy Studies  Morganii  Morganii          [facility]



               [place]



               



               



               

 

 Microbiolo  Proteus  Proteus          [facility]



 gy Studies  Mirabilis  Mirabilis          [facility]



               [place]



               



               



               

 

 Laboratory  Activated  32.2  24.3 -        [facility]



 Studies  Partial  SECONDS  36.9        [facility]



   Thromboplast            [place]



   Time            



               



               







Vital Signs







 Vital Sign  Value  Date  Comments  Source



         

 

 Temperature Oral (F)  97.2 F  2018    [facility]



         

 

 Systolic (mm Hg)  127  2018    [facility]



         

 

 Diastolic (mm Hg)  85  2018    [facility]



         

 

 Heart Rate  81  2018    [facility]



         

 

 Respitory Rate  18  2018    [facility]



         

 

 Heart Rate  90  2018    [facility]



         

 

 Systolic (mm Hg)  106  2018    [facility]



         

 

 Diastolic (mm Hg)  71  2018    [facility]



         

 

 Respitory Rate  18  2018    [facility]



         

 

 Temperature Oral (F)  98.1 F  2018    [facility]



         

 

 Respitory Rate  18  2018    [facility]



         

 

 Systolic (mm Hg)  168  2018    [facility]



         

 

 Diastolic (mm Hg)  107  2018    [facility]



         

 

 Heart Rate  87  2018    [facility]



         

 

 Temperature Oral (F)  98.1 F  2018    [facility]



         

 

 Weight  127.027  2018    [facility]



         

 

 Weight  127.027  2018    [facility]



         

 

 Weight  127.027  2018    [facility]



         

 

 BMI Calculated  48.16  2018    [facility]



         

 

 Height  162.56 cm  2018    [facility]



         

 

 Height  149.86 cm  2018    [facility]



         

 

 BMI Calculated  56.67  2018    [facility]



         

 

 Heart Rate  85  2018    [facility]



         [place]



         

 

 Systolic (mm Hg)  145  2018    [facility]



         [place]



         

 

 Diastolic (mm Hg)  66  2018    [facility]



         [place]



         

 

 Temperature Oral (F)  97.7 F  2018    [facility]



         [place]



         

 

 Respitory Rate  16  2018    [facility]



         [place]



         

 

 Height  59  2018    [facility]



         [place]



         

 

 Weight  280  2018    [facility]



         [place]



         







Encounters







 Location  Location  Encounter  Encounter  Reason  Attending  ADM  DC  Status  
Source



   Details  Type  Number  For  Provider  Date  Date    



         Visit          



                   



                   



                   

 

 [facility]    Discharged  [id number]          [facility]    Rec[facility]        /2017    [facility]



 [place]                  [place]



                   [facility]



                   



                   

 

 [facility]    Discharged  [id number]          [facility]



 [facility]    Recurring        /2018    [facility]



 [place]                  [place]



                   [place]



                   



                   

 

 [facility]    Departed  [id number]          [facility]



 [facility]    Emergency        /2018    [facility]



 [place]                  [place]



                   [place]



                   



                   

 

 [facility]    Discharged  [id number]          [facility]



 [facility]    Recurring        /2018    [facility]



 [place]                  [place]



                   [place]



                   



                   

 

 [facility]    Registered  [id number]      03/15      [facility]



 [facility]    Recurring        /      [facility]



 [place]                  [place]



                   [place]



                   



                   

 

 [facility]    Discharged  [id number]          [facility]



 [facility]    Inpatient        /2018    [facility]



 [place]                  [place]



                   [place]



                   



                   

 

 [facility]    Inpatient  [id number]    [facility]  /2018    [facility]



                   



                   



                   







Procedures







 Procedure  Code  Date  Perfomer  Comments  Source



           



           

 

 Chest Single View  [id number]        [facility]



     [facility]      [place]



           



           

 

 Gram Stain  [id number]        [facility]



     8      [place]



           



           

 

 Culture & Sensitivity  [id number]        [facility] -



     8      [place]



           



           

 

 Anaerobic Blood  [id number]        [facility] -



 Culture    8      [place]



           



           

 

 Aerobic Blood Culture  [id number]        [facility]



     8      [place]



           



           

 

 Chest Single View  [id number]        [facility]



     [facility]      [place]



           



           

 

 Gram Stain  [id number]        [facility] -



     8      [place]



           



           

 

 Culture & Sensitivity  [id number]        [facility]



     [facility]      [place]



           



           

 

 Chest Single View  [id number]        [facility] -



     8      [place]



           



           

 

 Cholecystectomy  [id number]        [facility]



           



           

 

 Shunt of cerebral  [id number]        [facility]



 ventricle to          [facility]



 extracranial site

## 2019-03-28 NOTE — XMS REPORT
Clinical Summary

 Created on:2019



Patient:Redd Dale

Sex:Male

:1985

External Reference #:[id number]





Demographics







 Address  [address]



   [place] [address]

 

 Home Phone  [phone]

 

 Preferred Language  English

 

 Marital Status  Unknown

 

 Orthodoxy Affiliation  Unknown

 

 Race  Black or 

 

 Ethnic Group  Not  or 









Author







 Organization  [employer]

 

 Address  [address] [place] [address]









Support







 Name  Relationship  Address  Phone

 

 Sabrina Dale  Unavailable  [address]  [phone]



     [place] [address]  









Care Team Providers







 Name  Role  Phone

 

 Ta  Primary Care Provider  [phone]









Allergies







 Active Allergy  Reactions  Severity  Noted Date  Comments

 

 Sulfamethoxazole-Trimethoprim  Hives  High  2017  

 

 Levofloxacin  Hives  High  2017  

 

 Morphine  Hives  High  2017  

 

 Sesame Seed  Hives    2017  

 

 Ketorolac  Rash  High  2017  

 

 Vancomycin Analogues  Rash  High  2017  

 

 Ondansetron Hcl (Pf)  Nausea And Vomiting    2017  







Medications







 Medication  Sig  Dispensed  Refills  Start Date  End Date  Status

 

 oxyCODONE-acetaminophe  Take 1 tablet by    0      Active



 n (PERCOCET)  mg  mouth every 4          



 per tablet  (four) hours as          



   needed for Pain.          







Active Problems







 Problem  Noted Date

 

 Spina bifida  2017

 

 Pyelonephritis  2017







Social History







 Tobacco Use  Types  Packs/Day  Years Used  Date

 

 Current Every Day Smoker  Cigarettes  0.5    









 Tobacco Cessation: Ready to Quit: No









 Sex Assigned at Birth  Date Recorded

 

 Not on file  









 Job Start Date  Occupation  Industry

 

 Not on file  Not on file  Not on file









 Travel History  Travel Start  Travel End









 No recent travel history available.







Last Filed Vital Signs

Not on file



Plan of Treatment

Not on file



Results

Not on fileafter 2018



Insurance







 Payer  Benefit Plan / Group  Subscriber ID  Type  Phone  Address

 

 MEDICAID - MEDICAID  [employer]  xxxxxxxxx  Medicaid    



 [facility]      Non-Contracted    









 Guarantor Name  Account Type  Relation to  Date of  Phone  Billing Address



     Patient  Birth    

 

 Redd Dale  Personal/Family  Self  1985  [phone]  [address]



         (Home)  [address]







           [place]



           [address]







Advance Directives

For more information, please contact:[facility] [place], [place] [phone]





 Code Status  Date Activated  Date Inactivated  Comments

 

 Full Code  2017  1:36 AM  2017  8:43 PM  









 This code status was determined by:  Patient

## 2019-03-28 NOTE — XMS REPORT
[facility]

 Created on:2018



Patient:Redd Dale

Sex:Male

:1985

External Reference #:[id number]





Demographics







 Address  [address]  [place] [address]

 

 Phone  [phone]

 

 Preferred Language  en

 

 Marital Status  Unknown

 

 Jainism Affiliation  Unknown

 

 Race  Black or 

 

 Ethnic Group  Not  or 









Author







 Organization  [employer]









Care Team Providers







 Name  Role  Phone

 

 Knox, Na  Provider Role  Unavailable









Allergies, Adverse Reactions, Alerts







 Substance  Reaction  Event Type

 

 Toradol  Info Not Available  Drug Allergy

 

 Sulfa  Info Not Available  Drug Allergy

 

 Zofran  Info Not Available  Drug Allergy

 

 Morphine Sulfate ER  Info Not Available  Drug Allergy

 

 Levaquin  Info Not Available  Drug Allergy







Problems







 Problem Type  Condition  Code  Onset Dates  Condition Status

 

 Problem  Nicotine dependence  F17.200    Active

 

 Problem  Lumbar spina bifida with  Q05.2    Active



   hydrocephalus      

 

 Problem  Dependence on wheelchair  Z99.3    Active

 

 Problem  Fecal incontinence  R15.9    Active

 

 Problem  Foot ulcer  L97.509    Active

 

 Problem  Depression with anxiety  F41.8    Active

 

 Problem  Paraplegia  G82.20    Active

 

 Problem  Constipation  K59.00    Active

 

 Problem  Incontinence of urine  R32    Active

 

 Problem  Nausea alone  R11.0    Active

 

 Assessment  Episodic tension-type headache, not  G44.219    Active



   intractable      

 

 Assessment   (ventriculoperitoneal) shunt  Z98.2    Active



   status      

 

 Assessment  Ulcer of left foot, unspecified  L97.529    Active



   ulcer stage      

 

 Assessment  Nonintractable episodic headache,  R51    Active



   unspecified headache type      

 

 Assessment  Depression with anxiety  F41.8    Active

 

 Problem  Episodic tension-type headache, not  G44.219    Active



   intractable      

 

 Assessment  Lumbar spina bifida with  Q05.2    Active



   hydrocephalus      

 

 Problem  Nonintractable episodic headache,  R51    Active



   unspecified headache type      

 

 Assessment  Nausea and vomiting, intractability  R11.2    Active



   of vomiting not specified,      



   unspecified vomiting type      

 

 Problem   (ventriculoperitoneal) shunt  Z98.2    Active



   status      







Medications







 Medication  Code  Code  Instructions  Start  End  Status  Dosage



   System      Date  Date    

 

 Tylenol with  NDC  31458468305  300-60 MG      Active  1 tablet as



 Codeine #4      Orally every 6        needed



       hrs        

 

 Macrobid  NDC  18213975303  100 MG Orally      Active  1 capsule



       every 12 hrs        with food

 

 Pantoprazole  NDC  17883560835  40 MG Orally      Active  1 tablet



 Sodium      Once a day        

 

 Collagenase  NDC  [address]-11  250 UNIT/GM      Active  1 application



       Externally        to affected



       Once a day        area







Results

No Known Results



Summary Purpose

[employer] Submission

## 2019-03-28 NOTE — ER
Nurse's Notes                                                                                     

 [facility]                                                                 

Name: Redd Dale Jr                                                                            

Age: 33 yrs                                                                                       

Sex: Male                                                                                         

: 1985                                                                                   

MRN: [id number]                                                                                   

Arrival Date: 2019                                                                          

Time: 20:33                                                                                       

Account#: [id number]                                                                            

Bed 25                                                                                            

Private MD:                                                                                       

Diagnosis: Dizziness and giddiness;Chest pain, unspecified                                        

                                                                                                  

Presentation:                                                                                     

                                                                                             

20:36 Presenting complaint: EMS states: there was a gas leak in the kitchen at [place] [address] 

      and pt has inhaled the gas-leak. Pt experienced dizziness, N/V, chest tightness,            

      headache, and sharp chest pain. Transition of care: patient was received from another       

      setting of care (long-term care facility), [facility]. Onset of         

      symptoms was 2019 at 20:00. Risk Assessment: Do you want to hurt yourself or      

      someone else? Patient reports no desire to harm self or others. Initial Sepsis Screen:      

      Does the patient meet any 2 criteria? RR > 20 per min. Does the patient have a              

      suspected source of infection? No. Patient's initial sepsis screen is negative. Care        

      prior to arrival: Glucose check: 138.                                                       

20:36 Method Of Arrival: EMS: [facility]                                                ca1 

20:36 Acuity: AYESHA 3                                                                           ca1 

                                                                                                  

Triage Assessment:                                                                                

20:36 General: Appears in no apparent distress. uncomfortable, Behavior is calm, cooperative, ca1 

      appropriate for age. Pain: Complains of pain in chest Pain does not radiate. Pain           

      currently is 8 out of 10 on a pain scale. Quality of pain is described as sharp, Pain       

      began 30 min ago. Is continuous.                                                            

                                                                                                  

Historical:                                                                                       

- Allergies:                                                                                      

20:44 Amoxicillin;                                                                            ca1 

20:44 Bactrim;                                                                                ca1 

20:44 Ciprofloxacin;                                                                          ca1 

20:44 CLAVULANIC ACID;                                                                        ca1 

20:44 Doxycycline;                                                                            ca1 

20:44 Levofloxacin;                                                                           ca1 

20:44 Morphine;                                                                               ca1 

20:44 Toradol;                                                                                ca1 

20:44 Vancomycin;                                                                             ca1 

20:44 Zofran;                                                                                 ca1 

20:44 TRIMETHOPRIM;                                                                           ca1 

20:44 PENICILLINS;                                                                            ca1 

- Home Meds:                                                                                      

20:44 Wellbutrin  mg Oral TbER 1 tab 2 times per day [Active]; Reglan 10 mg Oral tab 1  ca1 

      tab once daily [Active];                                                                    

- PMHx:                                                                                           

20:44 Asthma; Cerebral Palsy; cluster headaches; decubitus ulcers on feet; GERD;              ca1 

      Hydrocephalus; Hypertension; spina bifida;                                                  

- PSHx:                                                                                           

20:44  shunt; Cholecystectomy; Heel Surgery L;                                              ca1 

                                                                                                  

- Immunization history:: Flu vaccine is not up to date.                                           

- Social history:: Smoking status: Patient uses tobacco products, smokes one-half pack            

  cigarettes per day.                                                                             

- Ebola Screening: : No symptoms or risks identified at this time.                                

                                                                                                  

                                                                                                  

Screenin:46 Abuse screen: Denies threats or abuse. Denies injuries from another. Nutritional        ca1 

      screening: No deficits noted. Tuberculosis screening: No symptoms or risk factors           

      identified. Fall Risk Ambulatory Aid- None/Bed Rest/Nurse Assist (0 pts).                   

                                                                                                  

Assessment:                                                                                       

20:46 General: Appears in no apparent distress. comfortable, Behavior is calm, cooperative,   ca1 

      appropriate for age. Pain: Complains of pain in mid-sternal area Pain does not radiate.     

      Pain currently is 8 out of 10 on a pain scale. Quality of pain is described as sharp,       

      Pain began 30 min ago. Is continuous. Neuro: Level of Consciousness is awake, alert,        

      obeys commands, Oriented to person, place, time, situation. Cardiovascular: Heart tones     

      S1 S2 present Capillary refill < 3 seconds Patient's skin is warm and dry. Respiratory:     

      Airway is patent Respiratory effort is even, unlabored, Respiratory pattern is regular,     

      symmetrical, Breath sounds are clear bilaterally. Respiratory: Reports shortness of         

      breath. GI: Abdomen is round non-distended, Bowel sounds present X 4 quads. Abd is soft     

      and non tender X 4 quads. : No deficits noted. No signs and/or symptoms were reported     

      regarding the genitourinary system. EENT: No deficits noted. No signs and/or symptoms       

      were reported regarding the EENT system. Derm: Skin is healthy with good turgor, Skin       

      is pink, warm \T\ dry. Decubitus located on bilateral heel(s) approximately > 20 cm.        

      Musculoskeletal: Circulation, motion, and sensation intact. Capillary refill < 3            

      seconds, Range of motion: limited in left knee and right knee.                              

21:34 Reassessment: Patient appears in no apparent distress at this time. Patient and/or      ca1 

      family updated on plan of care and expected duration. Pain level reassessed. Patient is     

      alert, oriented x 3, equal unlabored respirations, skin warm/dry/pink.                      

22:26 Reassessment: Patient appears in no apparent distress at this time. Patient is alert,   ca1 

      oriented x 3, equal unlabored respirations, skin warm/dry/pink. Juice and water             

      requested and provided. No reports of nausea and vomiting.                                  

23:04 Reassessment: Patient appears in no apparent distress at this time. Patient is alert,   ca1 

      oriented x 3, equal unlabored respirations, skin warm/dry/pink. Pt is discharged.           

      Called and informed [facility] to arrange .                                       

                                                                                             

00:01 Reassessment: Still awaiting transport from nursing home.                               ca1 

00:17 Reassessment: Called [facility] for update. They said, Ms. Mendez from the facility   ca1 

      is on her way to  pt.                                                                

03:49 Reassessment: Patient appears in no apparent distress at this time. Patient and/or      aa1 

      family updated on plan of care and expected duration. Pain level reassessed. Patient is     

      alert, oriented x 3, equal unlabored respirations, skin warm/dry/pink. Staff member         

      called from [facility] and stated that it will at least be another hour and a half before her     

       can be here to  pt.                                                           

05:21 Reassessment: Patient appears in no apparent distress at this time. Patient and/or      aa1 

      family updated on plan of care and expected duration. Pain level reassessed. Pt still       

      awaiting nursing home transportation.                                                       

06:38 Reassessment: Patient appears in no apparent distress at this time. Patient and/or      aa1 

      family updated on plan of care and expected duration. Pain level reassessed. Patient is     

      alert, oriented x 3, equal unlabored respirations, skin warm/dry/pink. Pt still             

      awaiting transportation from [facility]. Attempted to contact facility again but no answer.       

                                                                                                  

Vital Signs:                                                                                      

                                                                                             

20:36  / 80; Pulse 106; Resp 20; Temp 99.6; Pulse Ox 97% on R/A; Weight 136.08 kg;      ca1 

      Height 4 ft. 11 in. (149.86 cm); Pain 8/10;                                                 

21:34  / 93; Pulse 103; Resp 19; Pulse Ox 97% on R/A;                                   ca1 

22:26  / 96; Pulse 102; Resp 19; Pulse Ox 99% on R/A;                                   ca1 

23:04  / 87; Pulse 96; Resp 19; Pulse Ox 97% on R/A;                                    ca1 

20:36 Body Mass Index 60.59 (136.08 kg, 149.86 cm)                                            ca1 

                                                                                                  

ED Course:                                                                                        

20:33 Patient arrived in ED.                                                                  ds1 

20:35 Dai Holt, RN is Primary Nurse.                                                      ca1 

20:36 Sunday Mosher PA is PHCP.                                                               [email or web address] 

20:36 Andrey Harper MD is Attending Physician.                                              [email or web address] 

20:36 Arm band placed on right wrist. EKG completed in triage. Results shown to MD.           ca1 

20:38 Triage completed.                                                                       ca1 

20:46 Patient has correct armband on for positive identification. Placed in gown. Bed in low  ca1 

      position. Call light in reach. Side rails up X2. Cardiac monitor on. Pulse ox on. NIBP      

      on. Warm blanket given.                                                                     

23:03 No provider procedures requiring assistance completed. Patient did not have IV access   ca1 

      during this emergency room visit.                                                           

                                                                                                  

Administered Medications:                                                                         

21:59 Drug: Phenergan 25 mg Route: PO;                                                        ca1 

23:02 Follow up: Response: No adverse reaction; Nausea is decreased                           ca1 

22:57 Drug: Dilaudid 1 mg Route: IM; Site: left deltoid;                                      ca1 

23:33 Follow up: Response: No adverse reaction; Pain is decreased                             ca1 

                                                                                                  

                                                                                                  

Outcome:                                                                                          

22:52 Discharge ordered by MD.                                                                [email or web address] 

                                                                                             

06:57 Patient left the ED.                                                                    iw  

                                                                                                  

Signatures:                                                                                       

Meseret Nielsen RN                  RN   Lilly Tolentino                                ds1                                                  

Renetta Terry, YADIEL                     RN                                                      

Sunday Mosher PA PA   [email or web address]                                                  

Dai Holt, YADIEL                        RN   ca1                                                  

                                                                                                  

**************************************************************************************************

## 2019-03-28 NOTE — XMS REPORT
Patient Summary Document

 Created on:2019



Patient:JORDAN VERDIN

Sex:Male

:1985

External Reference #:[id number]





Demographics







 Address  [address]



   [place] [address]

 

 Home Phone  [phone]

 

 Work Phone  [phone]

 

 Email Address  [phone]

 

 Preferred Language  Unknown

 

 Marital Status  Unknown

 

 Pentecostal Affiliation  Unknown

 

 Race  Unknown

 

 Additional Race(s)  Unavailable

 

 Ethnic Group  Unknown









Author







 Organization  [facility]

 

 Address  [address] [place] [address] [address]



   [place] [address]

 

 Phone  [phone]









Care Team Providers







 Name  Role  Phone

 

 RAMU TORRES  Unavailable  Unavailable









Problems

This patient has no known problems.



Allergies, Adverse Reactions, Alerts

This patient has no known allergies or adverse reactions.



Medications

This patient has no known medications.



Results







 Test Description  Test Time  Test Comments  Text Results  Atomic Results  
Result Comments









 BLOOD CULTURE  2017 16:22:00    









   Test Item  Value  Reference Range  Comments









 CULTURE (BEAKER) (test code=1095)  No growth in 5 days    



BLOOD [id number] 16:22:00





 Test Item  Value  Reference Range  Comments

 

 CULTURE (BEAKER) (test code=1095)  No growth in 5 days    



(MANUAL DIFFERENTIAL)2017 22:29:00





 Test Item  Value  Reference Range  Comments

 

 TOTAL COUNTED (BEAKER) (test code=1351)      

 

 WBC MORPHOLOGY (BEAKER) (test code=487)  Normal    

 

 PLT MORPHOLOGY (BEAKER) (test code=486)  Normal    

 

 RBC MORPHOLOGY (BEAKER) (test code=762)  Normal    



CBC W/PLT COUNT &amp; AUTO [id number] 22:28:00





 Test Item  Value  Reference Range  Comments

 

 WHITE BLOOD CELL COUNT (BEAKER) (test code=775)  7.3 K/ L  4.0-10.0  

 

 RED BLOOD CELL COUNT (BEAKER) (test code=761)  5.09 M/ L  4.20-5.80  

 

 HEMOGLOBIN (BEAKER) (test code=410)  13.0 GM/DL  13.0-16.8  

 

 HEMATOCRIT (BEAKER) (test code=411)  42.3 %  40.0-50.0  

 

 MEAN CORPUSCULAR VOLUME (BEAKER) (test code=753)  83.0 fL  82.0-98.0  

 

 MEAN CORPUSCULAR HEMOGLOBIN (BEAKER) (test  25.6 pg  27.0-33.0  



 code=751)      

 

 MEAN CORPUSCULAR HEMOGLOBIN CONC (BEAKER) (test  30.8 GM/DL  32.0-36.0  



 code=752)      

 

 RED CELL DISTRIBUTION WIDTH (BEAKER) (test  18.0 %  10.3-14.2  



 code=412)      

 

 PLATELET COUNT (BEAKER) (test code=756)  331 K/CU MM  150-430  

 

 MEAN PLATELET VOLUME (BEAKER) (test code=754)  8.5 fL  6.5-10.5  

 

 NUCLEATED RED BLOOD CELLS (BEAKER) (test  0 /100 WBC  0-0  



 code=413)      

 

 NEUTROPHILS RELATIVE PERCENT (BEAKER) (test  65 %    



 code=429)      

 

 LYMPHOCYTES RELATIVE PERCENT (BEAKER) (test  23 %    



 code=430)      

 

 MONOCYTES RELATIVE PERCENT (BEAKER) (test  8 %    



 code=431)      

 

 EOSINOPHILS RELATIVE PERCENT (BEAKER) (test  3 %    



 code=432)      

 

 BASOPHILS RELATIVE PERCENT (BEAKER) (test  1 %    



 code=437)      

 

 NEUTROPHILS ABSOLUTE COUNT (BEAKER) (test  4.75 K/ L  1.80-8.00  



 code=670)      

 

 LYMPHOCYTES ABSOLUTE COUNT (BEAKER) (test  1.68 K/ L  1.48-4.50  



 code=414)      

 

 MONOCYTES ABSOLUTE COUNT (BEAKER) (test  0.55 K/ L  0.00-1.30  



 code=415)      

 

 EOSINOPHILS ABSOLUTE COUNT (BEAKER) (test  0.24 K/ L  0.00-0.50  



 code=416)      

 

 BASOPHILS ABSOLUTE COUNT (BEAKER) (test  0.05 K/ L  0.00-0.20  



 code=417)      



[id number] METABOLIC [id number] 11:11:00





 Test Item  Value  Reference Range  Comments

 

 SODIUM (BEAKER) (test  138 meq/L  136-145  



 yglv=173)      

 

 POTASSIUM (BEAKER) (test  4.0 meq/L  3.5-5.1  



 code=379)      

 

 CHLORIDE (BEAKER) (test  108 meq/L    



 code=382)      

 

 CO2 (BEAKER) (test  23 meq/L  22-29  



 code=355)      

 

 BLOOD UREA NITROGEN  11 mg/dL  7-21  



 (BEAKER) (test code=354)      

 

 CREATININE (BEAKER) (test  0.74 mg/dL  0.57-1.25  



 code=358)      

 

 GLUCOSE RANDOM (BEAKER)  124 mg/dL    



 (test code=652)      

 

 CALCIUM (BEAKER) (test  8.9 mg/dL  8.4-10.2  



 code=697)      

 

 EGFR (BEAKER) (test  150 mL/min/1.73 sq m    ESTIMATED GFR IS NOT AS



 code=1092)      ACCURATE AS CREATININE



       CLEARANCE IN PREDICTING



       GLOMERULAR FILTRATION



       RATE. ESTIMATED GFR IS



       NOT APPLICABLE FOR



       DIALYSIS PATIENTS.



URINE [id number] 09:56:00





 Test Item  Value  Reference Range  Comments

 

 CULTURE (BEAKER) (test code=1095)  <10,000 col/mL skin jaime    



COMPREHENSIVE METABOLIC [id number] 08:49:00





 Test Item  Value  Reference Range  Comments

 

 TOTAL PROTEIN (BEAKER)  7.3 gm/dL  6.0-8.3  



 (test code=770)      

 

 ALBUMIN (BEAKER) (test  3.3 g/dL  3.5-5.0  



 code=1145)      

 

 ALKALINE PHOSPHATASE  89 U/L    



 (BEAKER) (test code=346)      

 

 BILIRUBIN TOTAL (BEAKER)  1.4 mg/dL  0.2-1.2  



 (test code=377)      

 

 SODIUM (BEAKER) (test  137 meq/L  136-145  



 csjw=989)      

 

 POTASSIUM (BEAKER) (test  3.7 meq/L  3.5-5.1  



 code=379)      

 

 CHLORIDE (BEAKER) (test  108 meq/L    



 code=382)      

 

 CO2 (BEAKER) (test  17 meq/L  22-29  



 code=355)      

 

 BLOOD UREA NITROGEN  12 mg/dL  7-21  



 (BEAKER) (test code=354)      

 

 CREATININE (BEAKER) (test  0.78 mg/dL  0.57-1.25  



 code=358)      

 

 GLUCOSE RANDOM (BEAKER)  119 mg/dL    



 (test code=652)      

 

 CALCIUM (BEAKER) (test  8.6 mg/dL  8.4-10.2  



 code=697)      

 

 AST (SGOT) (BEAKER) (test  13 U/L  5-34  



 code=353)      

 

 ALT (SGPT) (BEAKER) (test  28 U/L  6-55  



 code=347)      

 

 EGFR (BEAKER) (test  141 mL/min/1.73 sq    ESTIMATED GFR IS NOT AS



 code=1092)  m    ACCURATE AS CREATININE



       CLEARANCE IN PREDICTING



       GLOMERULAR FILTRATION



       RATE. ESTIMATED GFR IS



       NOT APPLICABLE FOR



       DIALYSIS PATIENTS.



CBC W/PLT COUNT &amp; AUTO [id number] 08:46:00





 Test Item  Value  Reference Range  Comments

 

 WHITE BLOOD CELL COUNT (BEAKER) (test code=775)  9.4 K/ L  4.0-10.0  

 

 RED BLOOD CELL COUNT (BEAKER) (test code=761)  4.79 M/ L  4.20-5.80  

 

 HEMOGLOBIN (BEAKER) (test code=410)  12.8 GM/DL  13.0-16.8  

 

 HEMATOCRIT (BEAKER) (test code=411)  40.0 %  40.0-50.0  

 

 MEAN CORPUSCULAR VOLUME (BEAKER) (test code=753)  83.4 fL  82.0-98.0  

 

 MEAN CORPUSCULAR HEMOGLOBIN (BEAKER) (test  26.7 pg  27.0-33.0  



 code=751)      

 

 MEAN CORPUSCULAR HEMOGLOBIN CONC (BEAKER) (test  32.0 GM/DL  32.0-36.0  



 code=752)      

 

 RED CELL DISTRIBUTION WIDTH (BEAKER) (test  18.2 %  10.3-14.2  



 code=412)      

 

 PLATELET COUNT (BEAKER) (test code=756)  313 K/CU MM  150-430  

 

 MEAN PLATELET VOLUME (BEAKER) (test code=754)  8.6 fL  6.5-10.5  

 

 NUCLEATED RED BLOOD CELLS (BEAKER) (test  0 /100 WBC  0-0  



 code=413)      

 

 NEUTROPHILS RELATIVE PERCENT (BEAKER) (test  70 %    



 code=429)      

 

 LYMPHOCYTES RELATIVE PERCENT (BEAKER) (test  16 %    



 code=430)      

 

 MONOCYTES RELATIVE PERCENT (BEAKER) (test  12 %    



 code=431)      

 

 EOSINOPHILS RELATIVE PERCENT (BEAKER) (test  1 %    



 code=432)      

 

 BASOPHILS RELATIVE PERCENT (BEAKER) (test  1 %    



 code=437)      

 

 NEUTROPHILS ABSOLUTE COUNT (BEAKER) (test  6.54 K/ L  1.80-8.00  



 code=670)      

 

 LYMPHOCYTES ABSOLUTE COUNT (BEAKER) (test  1.50 K/ L  1.48-4.50  



 code=414)      

 

 MONOCYTES ABSOLUTE COUNT (BEAKER) (test  1.13 K/ L  0.00-1.30  



 code=415)      

 

 EOSINOPHILS ABSOLUTE COUNT (BEAKER) (test  0.13 K/ L  0.00-0.50  



 code=416)      

 

 BASOPHILS ABSOLUTE COUNT (BEAKER) (test  0.06 K/ L  0.00-0.20  



 code=417)      



0.00URINALYSIS W/ [id number] 20:36:00





 Test Item  Value  Reference Range  Comments

 

 COLOR (BEAKER) (test code=470)  Yellow    

 

 CLARITY (BEAKER) (test code=469)  Hazy    

 

 SPECIFIC GRAVITY UA (BEAKER) (test code=468)  1.012  1.001-1.035  

 

 PH UA (BEAKER) (test code=467)  5.5  5.0-8.0  

 

 PROTEIN UA (BEAKER) (test code=464)  100 mg/dL  Negative  

 

 GLUCOSE UA (BEAKER) (test code=365)  Negative  Negative  

 

 KETONES UA (BEAKER) (test code=371)  Negative  Negative  

 

 BILIRUBIN UA (BEAKER) (test code=462)  Negative  Negative  

 

 BLOOD UA (BEAKER) (test code=461)  Moderate  Negative  

 

 NITRITE UA (BEAKER) (test code=465)  Negative  Negative  

 

 LEUKOCYTE ESTERASE UA (BEAKER) (test code=466)  Large  Negative  

 

 UROBILINOGEN UA (BEAKER) (test code=463)  3.0 mg/dL  0.2-1.0  

 

 RBC UA (BEAKER) (test code=519)  19 /HPF    

 

 WBC UA (BEAKER) (test code=520)  182 /HPF    

 

 SOURCE(BEAKER) (test code=0668)      



BASIC METABOLIC [id number] 17:00:00





 Test Item  Value  Reference Range  Comments

 

 SODIUM (BEAKER) (test  140 meq/L  136-145  



 hlve=598)      

 

 POTASSIUM (BEAKER) (test  3.7 meq/L  3.5-5.1  



 code=379)      

 

 CHLORIDE (BEAKER) (test  112 meq/L    



 code=382)      

 

 CO2 (BEAKER) (test  17 meq/L  22-29  



 code=355)      

 

 BLOOD UREA NITROGEN  23 mg/dL  7-21  



 (BEAKER) (test code=354)      

 

 CREATININE (BEAKER) (test  1.00 mg/dL  0.57-1.25  



 code=358)      

 

 GLUCOSE RANDOM (BEAKER)  102 mg/dL    



 (test code=652)      

 

 CALCIUM (BEAKER) (test  8.7 mg/dL  8.4-10.2  



 code=697)      

 

 EGFR (BEAKER) (test  106 mL/min/1.73 sq m    ESTIMATED GFR IS NOT AS



 code=1092)      ACCURATE AS CREATININE



       CLEARANCE IN PREDICTING



       GLOMERULAR FILTRATION



       RATE. ESTIMATED GFR IS



       NOT APPLICABLE FOR



       DIALYSIS PATIENTS.



Specimen slightly ictericCBC W/PLT COUNT &amp; AUTO [id number] 12:18
:00





 Test Item  Value  Reference Range  Comments

 

 WHITE BLOOD CELL COUNT (BEAKER) (test code=775)  16.4 K/ L  4.0-10.0  

 

 RED BLOOD CELL COUNT (BEAKER) (test code=761)  5.22 M/ L  4.20-5.80  

 

 HEMOGLOBIN (BEAKER) (test code=410)  14.4 GM/DL  13.0-16.8  

 

 HEMATOCRIT (BEAKER) (test code=411)  42.9 %  40.0-50.0  

 

 MEAN CORPUSCULAR VOLUME (BEAKER) (test code=753)  82.2 fL  82.0-98.0  

 

 MEAN CORPUSCULAR HEMOGLOBIN (BEAKER) (test  27.5 pg  27.0-33.0  



 code=751)      

 

 MEAN CORPUSCULAR HEMOGLOBIN CONC (BEAKER) (test  33.5 GM/DL  32.0-36.0  



 code=752)      

 

 RED CELL DISTRIBUTION WIDTH (BEAKER) (test  16.2 %  10.3-14.2  



 code=412)      

 

 PLATELET COUNT (BEAKER) (test code=756)  329 K/CU MM  150-430  

 

 MEAN PLATELET VOLUME (BEAKER) (test code=754)  8.2 fL  6.5-10.5  

 

 NUCLEATED RED BLOOD CELLS (BEAKER) (test  0 /100 WBC  0-0  



 code=413)      

 

 NEUTROPHILS RELATIVE PERCENT (BEAKER) (test  83 %    



 code=429)      

 

 LYMPHOCYTES RELATIVE PERCENT (BEAKER) (test  7 %    



 code=430)      

 

 MONOCYTES RELATIVE PERCENT (BEAKER) (test  9 %    



 code=431)      

 

 EOSINOPHILS RELATIVE PERCENT (BEAKER) (test  0 %    



 code=432)      

 

 BASOPHILS RELATIVE PERCENT (BEAKER) (test  0 %    



 code=437)      

 

 NEUTROPHILS ABSOLUTE COUNT (BEAKER) (test  1.62 K/ L  1.80-8.00  



 code=670)      

 

 LYMPHOCYTES ABSOLUTE COUNT (BEAKER) (test  1.15 K/ L  1.48-4.50  



 code=414)      

 

 MONOCYTES ABSOLUTE COUNT (BEAKER) (test  1.56 K/ L  0.00-1.30  



 code=415)      

 

 EOSINOPHILS ABSOLUTE COUNT (BEAKER) (test  0.01 K/ L  0.00-0.50  



 code=416)      

 

 BASOPHILS ABSOLUTE COUNT (BEAKER) (test  0.02 K/ L  0.00-0.20  



 code=417)      



(MANUAL DIFFERENTIAL)2017 12:18:00





 Test Item  Value  Reference Range  Comments

 

 TOTAL COUNTED (BEAKER) (test code=1351)      

 

 WBC MORPHOLOGY (BEAKER) (test code=487)  Normal    

 

 PLT MORPHOLOGY (BEAKER) (test code=486)  Normal    

 

 RBC MORPHOLOGY (BEAKER) (test code=762)  Normal

## 2019-03-28 NOTE — XMS REPORT
[facility]

 Created on:September 15, 2018



Patient:Redd Dale

Sex:Male

:1985

External Reference #:[id number]





Demographics







 Address  [address] [place] [address]

 

 Phone  [phone]

 

 Preferred Language  en

 

 Marital Status  Unknown

 

 [employer] Affiliation  Unknown

 

 Race  Black or 

 

 Ethnic Group  Unknown









Author







 Organization  [employer]









Care Team Providers







 Name  Role  Phone

 

 Knox, Na  Provider Role  Unavailable









Allergies, Adverse Reactions, Alerts







 Substance  Reaction  Event Type

 

 Zofran  Info Not Available  Drug Allergy

 

 Morphine Sulfate ER  Info Not Available  Drug Allergy

 

 Levaquin  Info Not Available  Drug Allergy







Problems







 Problem Type  Condition  Code  Onset Dates  Condition Status

 

 Assessment  Blood tests for routine general  Z00.00    Active



   physical examination      

 

 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



   disorder      

 

 Problem  Constipation  K59.00    Active

 

 Assessment  Ulcer of left foot, unspecified  L97.529    Active



   ulcer stage      

 

 Problem  Paraplegia  G82.20    Active

 

 Assessment   (ventriculoperitoneal) shunt  Z98.2    Active



   status      

 

 Problem  Nausea alone  R11.0    Active

 

 Problem  Fecal incontinence  R15.9    Active

 

 Problem  Incontinence of urine  R32    Active

 

 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



   disorder      

 

 Problem  Mental disorder, not otherwise  F99    Active



   specified      

 

 Assessment  Depression with anxiety  F41.8    Active

 

 Assessment  Bipolar depression  F31.30    Active

 

 Problem  Bipolar depression  F31.30    Active

 

 Assessment  Lumbar spina bifida with  Q05.2    Active



   hydrocephalus      

 

 Problem  Blood tests for routine general  Z00.00    Active



   physical examination      

 

 Problem  Depression with anxiety  F41.8    Active

 

 Problem  Nausea and vomiting, intractability  R11.2    Active



   of vomiting not specified,      



   unspecified vomiting type      

 

 Problem  Ulcer of left foot, unspecified  L97.529    Active



   ulcer stage      

 

 Problem  Nonintractable episodic headache,  R51    Active



   unspecified headache type      

 

 Problem   (ventriculoperitoneal) shunt  Z98.2    Active



   status      

 

 Problem  Episodic tension-type headache, not  G44.219    Active



   intractable      

 

 Problem  Lumbar spina bifida with  Q05.2    Active



   hydrocephalus      

 

 Problem  Foot ulcer  L97.509    Active

 

 Problem  Nicotine dependence  F17.200    Active

 

 Problem  Dependence on wheelchair  Z99.3    Active







Medications







 Medication  Code  Code  Instructions  Start  End  Status  Dosage



   System      Date  Date    

 

 Macrobid  NDC  43627158504  100 MG Orally      Active  1 capsule



       every 12 hrs        with food

 

 Tylenol with  NDC  59722114319  300-60 MG      Active  1 tablet as



 Codeine #4      Orally every 6        needed



       hrs        

 

 Pantoprazole  NDC  71605603310  40 MG Orally      Active  1 tablet



 Sodium      Once a day        

 

 Citalopram  NDC  70580572839  10 MG Orally      Active  1 tablet



 Hydrobromide      Once a day        

 

 Collagenase  NDC  [address]-08  250 UNIT/GM      Active  1 application



       Externally        to affected



       Once a day        area

 

 Seroquel  NDC  12379453822  25 MG Orally      Active  1 tablet



       Once a day at        



       bedtime        







Results

No Known Results



Summary Purpose

[employer] Submission

## 2019-03-28 NOTE — XMS REPORT
[facility]

 Created on:2018



Patient:Redd Dale

Sex:Male

:1985

External Reference #:[id number]





Demographics







 Address  [address]  [place] [address]

 

 Phone  [phone]

 

 Preferred Language  en

 

 Marital Status  Unknown

 

 [employer] Affiliation  Unknown

 

 Race  Black or 

 

 Ethnic Group  Unknown









Author







 Organization  [employer]









Care Team Providers







 Name  Role  Phone

 

 Knox, Na  Provider Role  Unavailable









Allergies, Adverse Reactions, Alerts







 Substance  Reaction  Event Type

 

 Zofran  Info Not Available  Drug Allergy

 

 Morphine Sulfate ER  Info Not Available  Drug Allergy

 

 Levaquin  Info Not Available  Drug Allergy







Problems







 Problem Type  Condition  Code  Onset Dates  Condition Status

 

 Assessment  Mental disorder, not otherwise  F99    Active



   specified      

 

 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



   disorder      

 

 Assessment  Ulcer of left foot, unspecified  L97.529    Active



   ulcer stage      

 

 Problem  Foot ulcer  L97.509    Active

 

 Assessment  Nonintractable episodic headache,  R51    Active



   unspecified headache type      

 

 Problem  Constipation  K59.00    Active

 

 Assessment  Episodic tension-type headache, not  G44.219    Active



   intractable      

 

 Problem  Paraplegia  G82.20    Active

 

 Problem  Incontinence of urine  R32    Active

 

 Problem  Nausea alone  R11.0    Active

 

 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



   disorder      

 

 Problem  Mental disorder, not otherwise  F99    Active



   specified      

 

 Assessment  Bipolar depression  F31.30    Active

 

 Assessment  Lumbar spina bifida with  Q05.2    Active



   hydrocephalus      

 

 Problem  Bipolar depression  F31.30    Active

 

 Assessment   (ventriculoperitoneal) shunt  Z98.2    Active



   status      

 

 Problem  Depression with anxiety  F41.8    Active

 

 Problem  Fecal incontinence  R15.9    Active

 

 Problem  Nausea and vomiting, intractability  R11.2    Active



   of vomiting not specified,      



   unspecified vomiting type      

 

 Problem  Ulcer of left foot, unspecified  L97.529    Active



   ulcer stage      

 

 Problem  Episodic tension-type headache, not  G44.219    Active



   intractable      

 

 Problem  Nonintractable episodic headache,  R51    Active



   unspecified headache type      

 

 Assessment  Depression with anxiety  F41.8    Active

 

 Problem  Dependence on wheelchair  Z99.3    Active

 

 Problem  Lumbar spina bifida with  Q05.2    Active



   hydrocephalus      

 

 Problem   (ventriculoperitoneal) shunt  Z98.2    Active



   status      

 

 Problem  Nicotine dependence  F17.200    Active







Medications







 Medication  Code  Code  Instructions  Start  End  Status  Dosage



   System      Date  Date    

 

 Collagenase  NDC  [address]-43  250 UNIT/GM      Active  1 application



       Externally        to affected



       Once a day        area

 

 Macrobid  NDC  [id number]  100 MG Orally      Active  1 capsule



       every 12 hrs        with food

 

 Tylenol with  NDC  88401401978  300-60 MG      Active  1 tablet as



 Codeine #4      Orally every 6        needed



       hrs        

 

 Citalopram  NDC  96718243914  10 MG Orally  July    Active  1 tablet



 Hydrobromide      Once a day  2018      

 

 Pantoprazole  NDC  70099704182  40 MG Orally      Active  1 tablet



 Sodium      Once a day        

 

 Seroquel  NDC  36153332747  25 MG Orally  July    Active  1 tablet



       Once a day at  16,      



       bedtime        







Results

No Known Results



Summary Purpose

[employer] Submission

## 2019-03-28 NOTE — XMS REPORT
[facility]

 Created on:2018



Patient:Redd Dale

Sex:Male

:1985

External Reference #:[id number]





Demographics







 Address  [address] [place] [address]

 

 Phone  [phone]

 

 Preferred Language  en

 

 Marital Status  Unknown

 

 [employer] Affiliation  Unknown

 

 Race  Black or 

 

 Ethnic Group  Not  or 









Author







 Organization  [employer]









Care Team Providers







 Name  Role  Phone

 

 Knox, Na  Provider Role  Unavailable









Allergies

No Known Allergies



Problems







 Problem Type  Condition  Code  Onset Dates  Condition Status

 

 Problem  Nicotine dependence  F17.200    Active

 

 Problem  Lumbar spina bifida with  Q05.2    Active



   hydrocephalus      

 

 Problem  Dependence on wheelchair  Z99.3    Active

 

 Problem  Fecal incontinence  R15.9    Active

 

 Problem  Foot ulcer  L97.509    Active

 

 Problem  Depression with anxiety  F41.8    Active

 

 Problem  Paraplegia  G82.20    Active

 

 Problem  Constipation  K59.00    Active

 

 Problem  Incontinence of urine  R32    Active

 

 Problem  Nausea alone  R11.0    Active

 

 Problem  Episodic tension-type headache, not  G44.219    Active



   intractable      

 

 Problem  Nonintractable episodic headache,  R51    Active



   unspecified headache type      

 

 Problem   (ventriculoperitoneal) shunt  Z98.2    Active



   status      







Medications

No Known Medications



Results

No Known Results



Summary Purpose

[employer] Submission

## 2019-03-28 NOTE — EDPHYS
Physician Documentation                                                                           

 [facility]                                                                 

Name: Redd Dale Jr                                                                            

Age: 33 yrs                                                                                       

Sex: Male                                                                                         

: 1985                                                                                   

MRN: [id number]                                                                                   

Arrival Date: 2019                                                                          

Time: 20:33                                                                                       

Account#: [id number]                                                                            

Bed 25                                                                                            

Private MD:                                                                                       

ED Physician Andrey Harper                                                                       

HPI:                                                                                              

                                                                                             

22:43 This 33 yrs old Black Male presents to ER via EMS with complaints of Dizziness.         jr8 

22:43 The patient presents with dizziness. Onset: The symptoms/episode began/occurred         jr8 

      acutely, today. Context: occurred outdoors, occurred while the patient was at rest.         

      Modifying factors: The symptoms are alleviated by nothing, the symptoms are aggravated      

      by nothing. Associated signs and symptoms: Pertinent positives: nausea. Severity of         

      symptoms: At their worst the symptoms were moderate in the emergency department the         

      symptoms are unchanged. Patient's baseline: Neuro: alert and fully oriented, Motor: no      

      deficits, Ambulation: unable to walk, uses wheelchair, Speech: normal. The patient has      

      not experienced similar symptoms in the past. The patient has not recently seen a           

      physician. Patient stated that there was a gas leak today where is currently resides.       

      Caused him to become nauseated and dizzy. Had chest pain as well .                          

                                                                                                  

Historical:                                                                                       

- Allergies:                                                                                      

20:44 Amoxicillin;                                                                            ca1 

20:44 Bactrim;                                                                                ca1 

20:44 Ciprofloxacin;                                                                          ca1 

20:44 CLAVULANIC ACID;                                                                        ca1 

20:44 Doxycycline;                                                                            ca1 

20:44 Levofloxacin;                                                                           ca1 

20:44 Morphine;                                                                               ca1 

20:44 Toradol;                                                                                ca1 

20:44 Vancomycin;                                                                             ca1 

20:44 Zofran;                                                                                 ca1 

20:44 TRIMETHOPRIM;                                                                           ca1 

20:44 PENICILLINS;                                                                            ca1 

- Home Meds:                                                                                      

20:44 Wellbutrin  mg Oral TbER 1 tab 2 times per day [Active]; Reglan 10 mg Oral tab 1  ca1 

      tab once daily [Active];                                                                    

- PMHx:                                                                                           

20:44 Asthma; Cerebral Palsy; cluster headaches; decubitus ulcers on feet; GERD;              ca1 

      Hydrocephalus; Hypertension; spina bifida;                                                  

- PSHx:                                                                                           

20:44  shunt; Cholecystectomy; Heel Surgery L;                                              ca1 

                                                                                                  

- Immunization history:: Flu vaccine is not up to date.                                           

- Social history:: Smoking status: Patient uses tobacco products, smokes one-half pack            

  cigarettes per day.                                                                             

- Ebola Screening: : No symptoms or risks identified at this time.                                

                                                                                                  

                                                                                                  

ROS:                                                                                              

22:43 Eyes: Negative for injury, pain, redness, and discharge, ENT: Negative for injury,      jr8 

      pain, and discharge, Neck: Negative for injury, pain, and swelling, Respiratory:            

      Negative for shortness of breath, cough, wheezing, and pleuritic chest pain, Back:          

      Negative for injury and pain, MS/Extremity: Negative for injury and deformity, Skin:        

      Negative for injury, rash, and discoloration, Neuro: Negative for headache, weakness,       

      numbness, tingling, and seizure.                                                            

22:43 Cardiovascular: Positive for chest pain, Negative for edema, orthopnea, palpitations,       

      paroxysmal nocturnal dyspnea.                                                               

22:43 Abdomen/GI: Positive for nausea.                                                            

22:43 Neuro: Positive for dizziness, Negative for altered mental status, headache.                

                                                                                                  

Exam:                                                                                             

22:43 Eyes:  Pupils equal round and reactive to light, extra-ocular motions intact.  Lids and jr8 

      lashes normal.  Conjunctiva and sclera are non-icteric and not injected.  Cornea within     

      normal limits.  Periorbital areas with no swelling, redness, or edema. ENT:  Nares          

      patent. No nasal discharge, no septal abnormalities noted.  Tympanic membranes are          

      normal and external auditory canals are clear.  Oropharynx with no redness, swelling,       

      or masses, exudates, or evidence of obstruction, uvula midline.  Mucous membranes           

      moist. Neck:  Trachea midline, no thyromegaly or masses palpated, and no cervical           

      lymphadenopathy.  Supple, full range of motion without nuchal rigidity, or vertebral        

      point tenderness.  No Meningismus. Cardiovascular:  Regular rate and rhythm with a          

      normal S1 and S2.  No gallops, murmurs, or rubs.  Normal PMI, no JVD.  No pulse             

      deficits. Respiratory:  Lungs have equal breath sounds bilaterally, clear to                

      auscultation and percussion.  No rales, rhonchi or wheezes noted.  No increased work of     

      breathing, no retractions or nasal flaring. Abdomen/GI:  Soft, non-tender, with normal      

      bowel sounds.  No distension or tympany.  No guarding or rebound.  No evidence of           

      tenderness throughout. Back:  No spinal tenderness.  No costovertebral tenderness.          

      Full range of motion. Skin:  Warm, dry with normal turgor.  Normal color with no            

      rashes, no lesions, and no evidence of cellulitis. Neuro:  Awake and alert, GCS 15,         

      oriented to person, place, time, and situation.  Cranial nerves II-XII grossly intact.      

      Motor strength 5/5 in all extremities.  Sensory grossly intact.                             

22:43 ECG was reviewed by the Attending Physician.                                            jr8 

                                                                                                  

Vital Signs:                                                                                      

20:36  / 80; Pulse 106; Resp 20; Temp 99.6; Pulse Ox 97% on R/A; Weight 136.08 kg;      ca1 

      Height 4 ft. 11 in. (149.86 cm); Pain 8/10;                                                 

21:34  / 93; Pulse 103; Resp 19; Pulse Ox 97% on R/A;                                   ca1 

22:26  / 96; Pulse 102; Resp 19; Pulse Ox 99% on R/A;                                   ca1 

23:04  / 87; Pulse 96; Resp 19; Pulse Ox 97% on R/A;                                    ca1 

20:36 Body Mass Index 60.59 (136.08 kg, 149.86 cm)                                            ca1 

                                                                                                  

MDM:                                                                                              

20:36 Patient medically screened.                                                             jr8 

22:43 Data reviewed: vital signs, nurses notes, EKG, and as a result, I will discharge        jr8 

      patient. Data interpreted: Pulse oximetry: on room air is 99 %. Interpretation: normal.     

      Counseling: I had a detailed discussion with the patient and/or guardian regarding: the     

      historical points, exam findings, and any diagnostic results supporting the                 

      discharge/admit diagnosis, the need for outpatient follow up, a family practitioner, to     

      return to the emergency department if symptoms worsen or persist or if there are any        

      questions or concerns that arise at home.                                                   

                                                                                                  

                                                                                             

20:56 Order name: EKG; Complete Time: 20:57                                                   [facility] 

                                                                                             

20:56 Order name: EKG - Nurse/Tech; Complete Time: 20:56                                      [facility] 

                                                                                                  

EC:43 Rate is 102 beats/min. Rhythm is regular, Sinus tachycardia. QRS Axis is Normal. [place]     jr8 

      interval is normal at 152 msec. QRS interval is normal at 94 msec. QT interval is           

      normal at 432 msec. No Q waves. T waves are Normal. No ST changes noted. Clinical           

      impression: Normal ECG. Interpreted by me. Reviewed by me.                                  

                                                                                                  

Administered Medications:                                                                         

21:59 Drug: Phenergan 25 mg Route: PO;                                                        ca1 

23:02 Follow up: Response: No adverse reaction; Nausea is decreased                           ca1 

22:57 Drug: Dilaudid 1 mg Route: IM; Site: left deltoid;                                      ca1 

23:33 Follow up: Response: No adverse reaction; Pain is decreased                             ca1 

                                                                                                  

                                                                                                  

Disposition:                                                                                      

19 22:52 Discharged to Home. Impression: Dizziness and giddiness, Chest pain,               

  unspecified.                                                                                    

- Condition is Stable.                                                                            

- Discharge Instructions: Nonspecific Chest Pain, Chemical Inhalation Injury, Adult.              

                                                                                                  

- Medication Reconciliation Form, Thank You Letter, Antibiotic Education, Prescription            

  Opioid Use form.                                                                                

- Follow up: Private Physician; When: 2 - 3 days; Reason: Recheck today's complaints,             

  Continuance of care, Re-evaluation by your physician.                                           

- Problem is new.                                                                                 

- Symptoms have improved.                                                                         

                                                                                                  

                                                                                                  

                                                                                                  

Signatures:                                                                                       

Renetta Terry, RN                     RN   iw                                                   

Sunday Mosher PA PA   jr8                                                  

Dai Holt RN                        RN   ca1                                                  

                                                                                                  

Corrections: (The following items were deleted from the chart)                                    

                                                                                             

06:57  22:52 2019 22:52 Discharged to Home. Impression: Dizziness and giddiness;   iw  

      Chest pain, unspecified. Condition is Stable. Forms are Medication Reconciliation Form,     

      Thank You Letter, Antibiotic Education, Prescription Opioid Use. Follow up: Private         

      Physician; When: 2 - 3 days; Reason: Recheck today's complaints, Continuance of care,       

      Re-evaluation by your physician. Problem is new. Symptoms have improved. jr8                

                                                                                                  

**************************************************************************************************

## 2019-03-29 VITALS — OXYGEN SATURATION: 97 % | SYSTOLIC BLOOD PRESSURE: 127 MMHG | DIASTOLIC BLOOD PRESSURE: 87 MMHG

## 2019-03-29 VITALS — TEMPERATURE: 99.6 F

## 2019-03-29 NOTE — EKG
Test Date:    2019-03-28               Test Time:    20:52:29

Technician:   CA                                     

                                                     

MEASUREMENT RESULTS:                                       

Intervals:                                           

Rate:         102                                    

KS:           152                                    

QRSD:         94                                     

QT:           332                                    

QTc:          432                                    

Axis:                                                

P:            62                                     

KS:           152                                    

QRS:          52                                     

T:            27                                     

                                                     

INTERPRETIVE STATEMENTS:                                       

                                                     

Sinus tachycardia

Otherwise normal ECG

Compared to ECG 01/23/2019 14:23:36

Sinus rhythm no longer present

T-wave abnormality no longer present



Electronically Signed On 03-29-19 06:06:20 CDT by Yaron Donohue

## 2019-10-20 ENCOUNTER — HOSPITAL ENCOUNTER (EMERGENCY)
Dept: HOSPITAL 97 - ER | Age: 34
Discharge: HOME | End: 2019-10-20
Payer: COMMERCIAL

## 2019-10-20 VITALS — SYSTOLIC BLOOD PRESSURE: 104 MMHG | OXYGEN SATURATION: 96 % | DIASTOLIC BLOOD PRESSURE: 71 MMHG

## 2019-10-20 VITALS — TEMPERATURE: 98 F

## 2019-10-20 DIAGNOSIS — Z88.1: ICD-10-CM
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
## 2019-10-20 DIAGNOSIS — Z88.5: ICD-10-CM
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
## 2019-10-20 DIAGNOSIS — Z88.0: ICD-10-CM

## 2019-10-20 DIAGNOSIS — I10: ICD-10-CM

## 2019-10-20 DIAGNOSIS — S70.02XA: Primary | ICD-10-CM
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
## 2019-10-20 DIAGNOSIS — Z88.3: ICD-10-CM

## 2019-10-20 DIAGNOSIS — G80.9: ICD-10-CM

## 2019-10-20 DIAGNOSIS — Z88.8: ICD-10-CM

## 2019-10-20 DIAGNOSIS — J45.909: ICD-10-CM

## 2019-10-20 PROCEDURE — 99284 EMERGENCY DEPT VISIT MOD MDM: CPT

## 2019-10-20 PROCEDURE — 72170 X-RAY EXAM OF PELVIS: CPT

## 2019-10-20 NOTE — ER
Nurse's Notes                                                                                     

 [facility]                                                                 

Name: Redd Dale Jr                                                                            

Age: 33 yrs                                                                                       

Sex: Male                                                                                         

: 1985                                                                                   

MRN: [id number]                                                                                   

Arrival Date: 10/20/2019                                                                          

Time: 09:49                                                                                       

Account#: [id number]                                                                            

Bed 2                                                                                             

Private MD:                                                                                       

Diagnosis: Contusion of left hip                                                                  

                                                                                                  

Presentation:                                                                                     

10/20                                                                                             

09:50 Presenting complaint: EMS states: pt was transferring from bed to wheelchair and did    aa5 

      not realize blanket was behind him so he slid down to the floor. Negative LOC. Pt c/o       

      pain to back and left hip. Pt reports he took Percocet this morning. Pt states "I am at     

      [facility] for wound care". Transition of care: patient was received from      

      another setting of care (long-term care facility), [facility].          

      Onset of symptoms was 2019. Risk Assessment: Do you want to hurt yourself       

      or someone else? Patient reports no desire to harm self or others. Initial Sepsis           

      Screen: Does the patient meet any 2 criteria? No. Patient's initial sepsis screen is        

      negative. Does the patient have a suspected source of infection? No. Patient's initial      

      sepsis screen is negative. Care prior to arrival: None.                                     

09:50 Acuity: AYESHA 4                                                                           aa5 

09:50 Method Of Arrival: EMS: [facility]                                                aa5 

                                                                                                  

Historical:                                                                                       

- Allergies:                                                                                      

09:51 Amoxicillin;                                                                            hb  

09:51 Bactrim;                                                                                hb  

09:51 Ciprofloxacin;                                                                          hb  

09:51 CLAVULANIC ACID;                                                                        hb  

09:51 Doxycycline;                                                                            hb  

09:51 Levofloxacin;                                                                           hb  

09:51 Morphine;                                                                               hb  

09:51 PENICILLINS;                                                                            hb  

09:51 Toradol;                                                                                hb  

09:51 TRIMETHOPRIM;                                                                           hb  

09:51 Vancomycin;                                                                             hb  

09:51 Zofran;                                                                                 hb  

09:51 Demerol;                                                                                hb  

- Home Meds:                                                                                      

09:51 Celexa 20 mg Oral tab 1 tab once daily [Active]; fentanyl 25 mcg/hr Topical pt72 1      hb  

      patch every 72 hours [Active]; Pepcid 20 mg Oral tab 1 tab once daily [Active];             

      Percocet  mg Oral tab 1 tab every 6 hours [Active]; Reglan 10 mg Oral tab 1 tab       

      once daily [Active]; Wellbutrin  mg Oral TbER 1 tab 2 times per day [Active];         

- PMHx:                                                                                           

09:51 Asthma; Cerebral Palsy; cluster headaches; decubitus ulcers on feet; GERD;              hb  

      Hydrocephalus; Hypertension; spina bifida;                                                  

- PSHx:                                                                                           

09:51  shunt; Cholecystectomy; Heel Surgery L;                                              hb  

                                                                                                  

- Immunization history:: Adult Immunizations up to date.                                          

- Social history:: Smoking status: Patient/guardian denies using tobacco.                         

- Ebola Screening: : No symptoms or risks identified at this time.                                

                                                                                                  

                                                                                                  

Screenin:50 Abuse screen: Denies threats or abuse. Denies injuries from another. Nutritional        hb  

      screening: No deficits noted. Tuberculosis screening: No symptoms or risk factors           

      identified. Fall Risk Total Kim Fall Scale indicates High Risk Score (45 or more          

      points). Fall prevention measures have been instituted. Side Rails Up X 2 Frequent          

      Obs/Assessments Occuring As available patient and family educated on Fall Prevention        

      Program and Strategies.                                                                     

                                                                                                  

Assessment:                                                                                       

09:50 General: Appears comfortable, obese, Behavior is calm, cooperative. Pain: Complains of  aa5 

      pain in lumbar area, left low back and right low back and left hip Pain does not            

      radiate. Pain currently is 10 out of 10 on a pain scale. Quality of pain is described       

      as sharp, Pain began post fall Is continuous. Neuro: Level of Consciousness is awake,       

      alert, obeys commands, Oriented to person, place, time, situation. Cardiovascular:          

      Heart tones S1 S2 present Rhythm is regular. Respiratory: Airway is patent Respiratory      

      effort is even, unlabored, Respiratory pattern is regular, symmetrical. GI: Abdomen is      

      obese, Bowel sounds present X 4 quads. : Ortega in place to gravity drainage. EENT: No     

      signs and/or symptoms were reported regarding the EENT system. Derm: Skin is pink, warm     

      \T\ dry.                                                                                    

09:50 Musculoskeletal: Pt is wheelchair bound.                                                aa5 

10:51 Reassessment: Patient appears in no apparent distress at this time. Patient is alert,   hb  

      oriented x 3, equal unlabored respirations, skin warm/dry/pink. Patient is                  

      alert/active/playful, equal unlabored respirations, skin warm/dry/pink.                     

11:00 Reassessment: Patient is alert, oriented x 3, equal unlabored respirations, skin        aa5 

      warm/dry/pink. Patient states symptoms have not improved. Pt currently requesting pain      

      medication, pt states "I can only have Dilaudid can you please tell the doctor",            

      notified pt I will notify MD of request. Awaiting x-rays. X-ray contacted. MD was           

      notified of pt's request. .                                                                 

11:41 Reassessment: Report given to Radha at [facility] and she states [place] [id number] 

      EMS will be here in approximately 1 hour to transfer pt back to [facility]. .                                                                               

12:23 Reassessment: Pt sitting up in bed eating lunch, pt tolerating well. Awaiting EMS for   aa5 

      transport back to nursing home. .                                                           

12:40 Reassessment: Patient is alert, oriented x 3, equal unlabored respirations, skin        aa5 

      warm/dry/pink. Patient states feeling better.                                               

                                                                                                  

Vital Signs:                                                                                      

09:50  / 97; Pulse 102; Resp 18 S; Temp 98.0(TE); Pulse Ox 97% on R/A; Weight 154.22 kg aa5 

      (R); Pain 10/10;                                                                            

10:45  / 78; Pulse 91; Resp 17 S; Pulse Ox 100% ;                                       aa5 

12:20  / 71; Pulse 93; Resp 18 S; Pulse Ox 96% on R/A;                                  aa5 

                                                                                                  

ED Course:                                                                                        

09:49 Patient arrived in ED.                                                                  aa5 

09:50 Arm band placed on.                                                                     aa5 

09:51 Triage completed.                                                                       aa5 

09:51 Patient has correct armband on for positive identification. Bed in low position. Call   hb  

      light in reach. Side rails up X2.                                                           

09:51 Patient maintains SpO2 saturation greater than 95% on room air. Thermoregulation: warm  hb  

      blanket given to patient.                                                                   

09:53 Vilma Kincaid, RN is Primary Nurse.                                                   aa5 

09:55 Andrey Harper MD is Attending Physician.                                              gs  

11:16 Hip Left 2 View XRAY In Process Unspecified.                                            EDMS

11:16 Pelvis XRAY In Process Unspecified.                                                     EDMS

11:16 Carloz De Dios MD is Referral Physician.                                              gs  

11:25 Note: DIFFICULT PATIENT DUE TO BODY HABITUS AND BEING OBESE, SMOOTH TRANSFER FROM       [facility] 

      STRETCHER TO X RAY TABLE ON AND BACK, BEST IMAGES OBTAINED, PT TOLERATED EXAM.              

12:40 No provider procedures requiring assistance completed. Patient did not have IV access   aa5 

      during this emergency room visit.                                                           

                                                                                                  

Administered Medications:                                                                         

No medications were administered                                                                  

                                                                                                  

                                                                                                  

Outcome:                                                                                          

11:18 Discharge ordered by MD.                                                                gs  

12:40 Discharged to nursing home. Report called to  Radha Report given to [facility]      aa5 

12:40 Condition: stable                                                                           

12:40 Instructed on discharge instructions, follow up and referral plans. Demonstrated            

      understanding of instructions, follow-up care.                                              

12:48 Patient left the ED.                                                                    aa5 

                                                                                                  

Signatures:                                                                                       

Dispatcher [employer]                           EDMS                                                 

Nadia Shabazz                               1                                                  

Vimla Kincaid RN                     RN   aa5                                                  

Josefa Bryson RN RN                                                      

Andrey Harper MD MD                                                      

                                                                                                  

Corrections: (The following items were deleted from the chart)                                    

:52 09:50 Presenting complaint: EMS states: pt was transferring from bed to wheelchair and  aa5 

      did not realize blanket was behind him so he slid down to the floor. Negative LOC. Pt       

      c/o pain to back and left hip. Pt reports he took Percocet this morning. aa5                

:52 09:50 Transition of care: patient was not received from another setting of care. aa5    aa5 

10:54 10:45  / 78; Pulse 91bpm; Resp 07bpm; Pulse Ox 100%; hb                           aa5 

                                                                                                  

**************************************************************************************************

## 2019-10-20 NOTE — RAD REPORT
EXAM DESCRIPTION:  RAD - Pelvis - 10/20/2019 11:16 am

 

CLINICAL HISTORY:  Pelvic pain status post injury

 

FINDINGS:  Chronic right hip subluxation

 

No fracture or dislocation left hip

## 2019-10-20 NOTE — RAD REPORT
EXAM DESCRIPTION:  RAD - Hip Left 2 View - 10/20/2019 11:16 am

 

CLINICAL HISTORY:   Left hip pain status post injury

 

FINDINGS:

 

Chronic right hip subluxation

 

No fracture or dislocation left hip

## 2019-10-20 NOTE — EDPHYS
Physician Documentation                                                                           

 [facility]                                                                 

Name: Redd Dale Jr                                                                            

Age: 33 yrs                                                                                       

Sex: Male                                                                                         

: 1985                                                                                   

MRN: [id number]                                                                                   

Arrival Date: 10/20/2019                                                                          

Time: 09:49                                                                                       

Account#: [id number]                                                                            

Bed 2                                                                                             

Private MD:                                                                                       

ED Physician Andrey Harper                                                                       

HPI:                                                                                              

10/20                                                                                             

11:14 This 33 yrs old Black Male presents to ER via EMS with complaints of Fall Injury.       gs  

11:14 Details of fall: The patient fell from an upright position, transferring wheelchair.    gs  

      Onset: The symptoms/episode began/occurred acutely, just prior to arrival. Associated       

      injuries: The patient sustained left hip. Severity of symptoms: At their worst the          

      symptoms were moderate, in the emergency department the symptoms are unchanged. The         

      patient has experienced similar episodes in the past, a few times. The patient has not      

      recently seen a physician.                                                                  

                                                                                                  

Historical:                                                                                       

- Allergies:                                                                                      

09:51 Amoxicillin;                                                                            hb  

09:51 Bactrim;                                                                                hb  

09:51 Ciprofloxacin;                                                                          hb  

09:51 CLAVULANIC ACID;                                                                        hb  

09:51 Doxycycline;                                                                            hb  

09:51 Levofloxacin;                                                                           hb  

09:51 Morphine;                                                                               hb  

09:51 PENICILLINS;                                                                            hb  

09:51 Toradol;                                                                                hb  

09:51 TRIMETHOPRIM;                                                                           hb  

09:51 Vancomycin;                                                                             hb  

09:51 Zofran;                                                                                 hb  

09:51 Demerol;                                                                                hb  

- Home Meds:                                                                                      

09:51 Celexa 20 mg Oral tab 1 tab once daily [Active]; fentanyl 25 mcg/hr Topical pt72 1      hb  

      patch every 72 hours [Active]; Pepcid 20 mg Oral tab 1 tab once daily [Active];             

      Percocet  mg Oral tab 1 tab every 6 hours [Active]; Reglan 10 mg Oral tab 1 tab       

      once daily [Active]; Wellbutrin  mg Oral TbER 1 tab 2 times per day [Active];         

- PMHx:                                                                                           

09:51 Asthma; Cerebral Palsy; cluster headaches; decubitus ulcers on feet; GERD;              hb  

      Hydrocephalus; Hypertension; spina bifida;                                                  

- PSHx:                                                                                           

09:51  shunt; Cholecystectomy; Heel Surgery L;                                              hb  

                                                                                                  

- Immunization history:: Adult Immunizations up to date.                                          

- Social history:: Smoking status: Patient/guardian denies using tobacco.                         

- Ebola Screening: : No symptoms or risks identified at this time.                                

                                                                                                  

                                                                                                  

ROS:                                                                                              

11:14 All other systems are negative.                                                         gs  

                                                                                                  

Exam:                                                                                             

11:14 Head/Face:  Normocephalic, atraumatic. Neck:  Trachea midline, no thyromegaly or masses gs  

      palpated, and no cervical lymphadenopathy.  Supple, full range of motion without nuchal     

      rigidity, or vertebral point tenderness.  No Meningismus. Cardiovascular:  Regular rate     

      and rhythm with a normal S1 and S2.  No gallops, murmurs, or rubs.  Normal PMI, no JVD.     

       No pulse deficits. Respiratory:  Lungs have equal breath sounds bilaterally, clear to      

      auscultation and percussion.  No rales, rhonchi or wheezes noted.  No increased work of     

      breathing, no retractions or nasal flaring. Abdomen/GI:  Soft, non-tender, with normal      

      bowel sounds.  No distension or tympany.  No guarding or rebound.  No evidence of           

      tenderness throughout. Back:  No spinal tenderness.  No costovertebral tenderness.          

      Full range of motion.                                                                       

11:14 Constitutional: The patient appears alert, awake.                                           

11:14 Musculoskeletal/extremity: Extremities: noted in the left hip: pain, tenderness, There      

      is no evidence of  deformity, Perfusion: the patient is normally perfused throughout.       

11:14 Skin: Exam negative for acute changes.                                                      

                                                                                                  

Vital Signs:                                                                                      

09:50  / 97; Pulse 102; Resp 18 S; Temp 98.0(TE); Pulse Ox 97% on R/A; Weight 154.22 kg aa5 

      (R); Pain 10/10;                                                                            

10:45  / 78; Pulse 91; Resp 17 S; Pulse Ox 100% ;                                       aa5 

12:20  / 71; Pulse 93; Resp 18 S; Pulse Ox 96% on R/A;                                  aa5 

                                                                                                  

MDM:                                                                                              

10:05 Patient medically screened.                                                             gs  

11:14 Differential diagnosis: abrasion, contusion, fracture, sprain. Data reviewed: vital     gs  

      signs, nurses notes. Counseling: I had a detailed discussion with the patient and/or        

      guardian regarding: the historical points, exam findings, and any diagnostic results        

      supporting the discharge/admit diagnosis, the need for outpatient follow up. Response       

      to treatment: the patient's symptoms have markedly improved after treatment, and as a       

      result, I will discharge patient.                                                           

                                                                                                  

10/20                                                                                             

09:59 Order name: Hip Left 2 View XRAY; Complete Time: 12:33                                    

10/20                                                                                             

09:59 Order name: Pelvis XRAY; Complete Time: 12:33                                             

10/20                                                                                             

11:45 Order name: Diet Heart Healthy; Complete Time: 11:46                                    aa5 

                                                                                                  

Administered Medications:                                                                         

No medications were administered                                                                  

                                                                                                  

                                                                                                  

Disposition:                                                                                      

10/20/19 11:18 Discharged to Home. Impression: Contusion of left hip.                             

- Condition is Stable.                                                                            

- Discharge Instructions: Contusion.                                                              

                                                                                                  

- Medication Reconciliation Form, Thank You Letter, Antibiotic Education, Prescription            

  Opioid Use form.                                                                                

- Follow up: Carloz De Dios MD; When: 2 - 3 days; Reason: Re-evaluation by your                 

  physician.                                                                                      

                                                                                                  

                                                                                                  

                                                                                                  

Signatures:                                                                                       

Dispatcher [employer]                           EDVilma Berry RN                     RN   aa5                                                  

Josefa Bryson RN                     RN                                                      

Andrey Harper MD MD                                                      

                                                                                                  

Corrections: (The following items were deleted from the chart)                                    

12:48 11:18 10/20/2019 11:18 Discharged to Home. Impression: Contusion of left hip. Condition aa5 

      is Stable. Forms are Medication Reconciliation Form, Thank You Letter, Antibiotic           

      Education, Prescription Opioid Use. Follow up: Carloz De Dios; When: 2 - 3 days;            

      Reason: Re-evaluation by your physician.                                                  

                                                                                                  

**************************************************************************************************

## 2020-01-07 ENCOUNTER — HOSPITAL ENCOUNTER (EMERGENCY)
Dept: HOSPITAL 97 - ER | Age: 35
Discharge: ANOTHER HOSPITAL | End: 2020-01-07
Payer: COMMERCIAL

## 2020-01-07 VITALS — SYSTOLIC BLOOD PRESSURE: 122 MMHG | DIASTOLIC BLOOD PRESSURE: 63 MMHG | OXYGEN SATURATION: 96 %

## 2020-01-07 VITALS — TEMPERATURE: 97.3 F

## 2020-01-07 DIAGNOSIS — T85.09XA: Primary | ICD-10-CM

## 2020-01-07 DIAGNOSIS — K21.9: ICD-10-CM

## 2020-01-07 DIAGNOSIS — Z88.0: ICD-10-CM

## 2020-01-07 DIAGNOSIS — Z88.1: ICD-10-CM

## 2020-01-07 DIAGNOSIS — Z88.6: ICD-10-CM

## 2020-01-07 DIAGNOSIS — F17.210: ICD-10-CM

## 2020-01-07 DIAGNOSIS — I10: ICD-10-CM

## 2020-01-07 LAB
ALBUMIN SERPL BCP-MCNC: 3.5 G/DL (ref 3.4–5)
ALP SERPL-CCNC: 154 U/L (ref 45–117)
ALT SERPL W P-5'-P-CCNC: 50 U/L (ref 12–78)
AST SERPL W P-5'-P-CCNC: 28 U/L (ref 15–37)
BUN BLD-MCNC: 17 MG/DL (ref 7–18)
GLUCOSE SERPLBLD-MCNC: 563 MG/DL (ref 74–106)
HCT VFR BLD CALC: 51.4 % (ref 39.6–49)
LIPASE SERPL-CCNC: 51 U/L (ref 73–393)
LYMPHOCYTES # SPEC AUTO: 1.7 K/UL (ref 0.7–4.9)
PMV BLD: 9.4 FL (ref 7.6–11.3)
POTASSIUM SERPL-SCNC: 4.5 MMOL/L (ref 3.5–5.1)
RBC # BLD: 5.91 M/UL (ref 4.33–5.43)

## 2020-01-07 PROCEDURE — 82947 ASSAY GLUCOSE BLOOD QUANT: CPT

## 2020-01-07 PROCEDURE — 96361 HYDRATE IV INFUSION ADD-ON: CPT

## 2020-01-07 PROCEDURE — 49427 INJECTION ABDOMINAL SHUNT: CPT

## 2020-01-07 PROCEDURE — 36415 COLL VENOUS BLD VENIPUNCTURE: CPT

## 2020-01-07 PROCEDURE — 85025 COMPLETE CBC W/AUTO DIFF WBC: CPT

## 2020-01-07 PROCEDURE — 96375 TX/PRO/DX INJ NEW DRUG ADDON: CPT

## 2020-01-07 PROCEDURE — 80076 HEPATIC FUNCTION PANEL: CPT

## 2020-01-07 PROCEDURE — 96374 THER/PROPH/DIAG INJ IV PUSH: CPT

## 2020-01-07 PROCEDURE — 99285 EMERGENCY DEPT VISIT HI MDM: CPT

## 2020-01-07 PROCEDURE — 80048 BASIC METABOLIC PNL TOTAL CA: CPT

## 2020-01-07 PROCEDURE — 75809 NONVASCULAR SHUNT X-RAY: CPT

## 2020-01-07 PROCEDURE — 70450 CT HEAD/BRAIN W/O DYE: CPT

## 2020-01-07 PROCEDURE — 83690 ASSAY OF LIPASE: CPT

## 2020-01-07 NOTE — ER
Nurse's Notes                                                                                     

 [facility]                                                                 

Name: Redd Dale Jr                                                                            

Age: 34 yrs                                                                                       

Sex: Male                                                                                         

: 1985                                                                                   

MRN: [id number]                                                                                   

Arrival Date: 2020                                                                          

Time: 10:21                                                                                       

Account#: [id number]                                                                            

Bed 2                                                                                             

Private MD:                                                                                       

Diagnosis:  Shunt Malfunction                                                                   

                                                                                                  

Presentation:                                                                                     

                                                                                             

10:23 Presenting complaint: EMS states: patient has been having dizziness, blurred vision,    mg2 

      n/v for the last 3 days. he had the same symptoms before when his  shunt is clogged.      

      Transition of care: patient was received from another setting of care (long-term care       

      facility), [facility]. Onset of symptoms was 2020. Risk     

      Assessment: Do you want to hurt yourself or someone else? Patient reports no desire to      

      harm self or others. Initial Sepsis Screen: Does the patient meet any 2 criteria? No.       

      Patient's initial sepsis screen is negative. Does the patient have a suspected source       

      of infection? No. Patient's initial sepsis screen is negative. Care prior to arrival:       

      None.                                                                                       

10:23 Method Of Arrival: EMS: [facility]                                                mg2 

10:23 Acuity: AYESHA 2                                                                           mg2 

                                                                                                  

Historical:                                                                                       

- Allergies:                                                                                      

10:23 Amoxicillin;                                                                            hb  

10:23 Bactrim;                                                                                hb  

10:23 Ciprofloxacin;                                                                          hb  

10:23 CLAVULANIC ACID;                                                                        hb  

10:23 Demerol;                                                                                hb  

10:23 Doxycycline;                                                                            hb  

10:23 Levofloxacin;                                                                           hb  

10:23 Morphine;                                                                               hb  

10:23 PENICILLINS;                                                                            hb  

10:23 Toradol;                                                                                hb  

10:23 TRIMETHOPRIM;                                                                           hb  

10:23 Vancomycin;                                                                             hb  

10:23 Zofran;                                                                                 hb  

- Home Meds:                                                                                      

10:23 Celexa 20 mg Oral tab 1 tab once daily [Active]; fentanyl 25 mcg/hr Topical pt72 1      hb  

      patch every 72 hours [Active]; Pepcid 20 mg Oral tab 1 tab once daily [Active];             

      Percocet  mg Oral tab 1 tab every 6 hours [Active]; Reglan 10 mg Oral tab 1 tab       

      once daily [Active]; Wellbutrin  mg Oral TbER 1 tab 2 times per day [Active];         

- PMHx:                                                                                           

10:23 Asthma; Cerebral Palsy; cluster headaches; decubitus ulcers on feet; GERD;              hb  

      Hydrocephalus; Hypertension; spina bifida;                                                  

- PSHx:                                                                                           

10:23  shunt; Heel Surgery L; Cholecystectomy;                                              hb  

                                                                                                  

- Immunization history:: Adult Immunizations up to date.                                          

- Social history:: Smoking status: Patient uses tobacco products, smokes one-half pack            

  cigarettes per day.                                                                             

- Ebola Screening: : No symptoms or risks identified at this time.                                

                                                                                                  

                                                                                                  

Screening:                                                                                        

10:24 Abuse screen: Denies threats or abuse. Denies injuries from another. Nutritional        hb  

      screening: No deficits noted. Tuberculosis screening: No symptoms or risk factors           

      identified. Fall Risk Total Kim Fall Scale indicates High Risk Score (45 or more          

      points). Fall prevention measures have been instituted. Side Rails Up X 2 Frequent          

      Obs/Assessments Occuring As available patient and family educated on Fall Prevention        

      Program and Strategies.                                                                     

                                                                                                  

Assessment:                                                                                       

10:35 General: Appears in no apparent distress. comfortable, Behavior is calm, cooperative.   mg2 

      Pain: Complains of pain in head Pain does not radiate. Pain currently is 9 out of 10 on     

      a pain scale. Quality of pain is described as aching, Pain began gradually, 2-3 days        

      ago. Is intermittent. Neuro: Level of Consciousness is awake, alert, obeys commands,        

      Oriented to person, place, time, situation. Neuro: Reports blurred vision headache.         

      Cardiovascular: Capillary refill < 3 seconds Patient's skin is warm and dry.                

      Respiratory: Airway is patent Respiratory effort is even, unlabored, Respiratory            

      pattern is regular, symmetrical. GI: Abdomen is round Reports nausea, vomiting. : No      

      signs and/or symptoms were reported regarding the genitourinary system. EENT: No signs      

      and/or symptoms were reported regarding the EENT system. Derm: Skin is intact, is           

      healthy with good turgor, Skin is pink, warm \T\ dry. normal. Musculoskeletal:              

      Circulation, motion, and sensation intact. Capillary refill < 3 seconds, Swelling           

      present in right foot, left foot, right leg and left leg.                                   

11:18 Reassessment: Patient appears in no apparent distress at this time. Patient and/or      hb  

      family updated on plan of care and expected duration. Pain level reassessed. Patient is     

      alert, oriented x 3, equal unlabored respirations, skin warm/dry/pink.                      

12:05 Reassessment: Patient appears in no apparent distress at this time. Patient and/or      mg2 

      family updated on plan of care and expected duration. Pain level reassessed. Patient is     

      alert, oriented x 3, equal unlabored respirations, skin warm/dry/pink.                      

12:59 Reassessment: Patient appears in no apparent distress at this time. No changes from     hb  

      previously documented assessment. Patient and/or family updated on plan of care and         

      expected duration. Pain level reassessed.                                                   

13:45 Reassessment: Patient appears in no apparent distress at this time. No changes from     hb  

      previously documented assessment. Patient and/or family updated on plan of care and         

      expected duration. Pain level reassessed.                                                   

14:28 Reassessment: Pt reports headache and body pain 9/10, . Dr Lerner notified,     hb  

      repeat Dilaudid and insulin administered as ordered. Transfer pending.                      

15:20 Reassessment: report given to YADIEL Ashton of [facility].                              mg2 

15:45 Reassessment: Patient appears in no apparent distress at this time. Patient and/or      hb  

      family updated on plan of care and expected duration. Pain level reassessed. Patient is     

      alert, oriented x 3, equal unlabored respirations, skin warm/dry/pink.                      

16:45 Reassessment: Patient appears in no apparent distress at this time. Patient and/or      hb  

      family updated on plan of care and expected duration. Pain level reassessed. Patient is     

      alert, oriented x 3, equal unlabored respirations, skin warm/dry/pink.                      

                                                                                                  

Vital Signs:                                                                                      

10:23  / 83; Pulse 114; Resp 16; Temp 97.1(TE); Pulse Ox 96% ; Weight 136.08 kg; Height hb  

      4 ft. 11 in. (149.86 cm); Pain 9/10;                                                        

11:18  / 71; Pulse 97; Resp 16; Pulse Ox 96% ;                                          hb  

12:05  / 94; Pulse 102; Resp 18; Pulse Ox 99% on 3 lpm NC;                              mg2 

12:15 Pulse Ox 92% on 3 lpm NC;                                                               hb  

12:45  / 91; Pulse 111; Resp 15; Temp 97.3(TE); Pulse Ox 97% on 4 lpm NC;               hb  

14:15  / 82; Pulse 102; Resp 17; Pulse Ox 97% on 4 lpm NC; Pain 9/10;                   hb  

15:45  / 76; Pulse 105; Resp 19; Pulse Ox 97% on 4 lpm NC;                              hb  

16:45  / 63; Pulse 100; Resp 17; Pulse Ox 96% on 4 lpm NC;                              hb  

10:23 Body Mass Index 60.59 (136.08 kg, 149.86 cm)                                            hb  

                                                                                                  

ED Course:                                                                                        

10:21 Patient arrived in ED.                                                                  hb  

10:22 Juan Luis Lerner MD is Attending Physician.                                              kdr 

10:23 Miguel Skaggs, RN is Primary Nurse.                                                  mg2 

10:24 Arm band placed on.                                                                     hb  

10:24 Patient has correct armband on for positive identification. Bed in low position. Call   hb  

      light in reach. Side rails up X2.                                                           

10:25 Triage completed.                                                                       mg2 

10:35 No provider procedures requiring assistance completed. Inserted saline lock: 22 gauge   mg2 

      in right forearm, using aseptic technique. Blood collected.                                 

11:11 CT Head Brain wo Cont In Process Unspecified.                                           EDMS

12:05 Cardiac monitor on. Pulse ox on. NIBP on. Door closed. Warm blanket given. Repositioned mg2 

      patient.                                                                                    

12:29 contacted [facility], pt was denied due to no capacity at this time, per ivonne.        bd  

12:38 contacted [facility], pt denied due to facility being on saturation, per bernabe      bd  

      brown.                                                                                      

13:13 attempted transfer to [facility].                                              bd  

14:19 Shuntogram XRAY In Process Unspecified.                                                 EDMS

17:27 Patient transferred, IV remains in place.                                               mg2 

                                                                                                  

Administered Medications:                                                                         

10:33 Drug: Pepcid 20 mg Route: IVP; Site: right forearm;                                     mg2 

11:10 Follow up: Response: No adverse reaction                                                hb  

10:34 Drug: NS 0.9% 1000 ml Route: IV; Rate: 1 bolus; Site: right forearm;                    mg2 

11:30 Follow up: Response: No adverse reaction; IV Status: Completed infusion; IV Intake:     hb  

      1000ml                                                                                      

10:34 Drug: Phenergan 12.5 mg Route: IVP; Site: right forearm;                                mg2 

11:10 Follow up: Response: No adverse reaction                                                hb  

10:46 Drug: Dilaudid 1 mg Route: IVP; Site: right forearm;                                    hb  

11:25 Follow up: Response: RASS: Drowsy (-1)                                                  hb  

13:22 Drug: Insulin Regular Human 10 units {Co-Signature: iw (Renetta Terry RN).} Route:     mg2 

      IVP; Site: right forearm;                                                                   

14:20 Follow up: Response: No adverse reaction; Blood sugar is lowered                        hb  

14:27 Drug: Dilaudid 1 mg Route: IVP; Site: right forearm;                                    hb  

17:27 Follow up: Response: No adverse reaction; Marked relief of symptoms                     mg2 

14:29 Drug: Insulin Regular Human 5 units {Co-Signature: mg2 (Miguel Skaggs RN).} Route:    hb  

      IVP; Site: right forearm;                                                                   

17:27 Follow up: Response: No adverse reaction; Blood sugar is lowered                        mg2 

17:27 Drug: Dilaudid 1 mg Route: IVP; Site: right forearm;                                    hb  

17:28 Follow up: Response: Medication administered at discharge.                              hb  

                                                                                                  

                                                                                                  

Point of Care Testing:                                                                            

      Blood Glucose:                                                                              

14:22 Blood Glucose: 378 mg/dL;                                                               hb  

      Ranges:                                                                                     

                                                                                                  

Intake:                                                                                           

11:30 IV: 1000ml; Total: 1000ml.                                                              hb  

                                                                                                  

Outcome:                                                                                          

12:56 ER care complete, transfer ordered by MD.                                               kdr 

17:27 Transferred by ground EMS to [facility], Transfer form completed.    mg2 

17:27 Condition: stable                                                                           

17:27 Instructed on the need for transfer, Demonstrated understanding of instructions.            

17:28 Patient left the ED.                                                                    mg2 

                                                                                                  

Signatures:                                                                                       

Dispatcher [employer]                           EDMS                                                 

Eileen Amato Kevin, MD MD   [facility]                                                  

Josefa Bryson RN                     RN                                                      

Miguel Skaggs RN RN   mg2                                                  

Renetta Terry RN                            iw                                                   

Miguel Skaggs RN                           mg2                                                  

                                                                                                  

Corrections: (The following items were deleted from the chart)                                    

13:55 13:45 Reassessment: Patient appears in no apparent distress at this time. hb            hb  

                                                                                                  

**************************************************************************************************

## 2020-01-07 NOTE — XMS REPORT
[facility]

 Created on:2018



Patient:Redd Dale

Sex:Male

:1985

External Reference #:[id number]





Demographics







 Address  [address]  [place] [address]

 

 Phone  [phone]

 

 Preferred Language  en

 

 Marital Status  Unknown

 

 Worship Affiliation  Unknown

 

 Race  Black or 

 

 Ethnic Group  Unknown









Author







 Organization  [employer]









Care Team Providers







 Name  Role  Phone

 

 Knox, Na  Provider Role  Unavailable









Allergies, Adverse Reactions, Alerts







 Substance  Reaction  Event Type

 

 Zofran  Info Not Available  Drug Allergy

 

 Morphine Sulfate ER  Info Not Available  Drug Allergy

 

 Levaquin  Info Not Available  Drug Allergy







Problems







 Problem Type  Condition  Code  Onset Dates  Condition Status

 

 Assessment  Mental disorder, not otherwise  F99    Active



   specified      

 

 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



   disorder      

 

 Assessment  Ulcer of left foot, unspecified  L97.529    Active



   ulcer stage      

 

 Problem  Foot ulcer  L97.509    Active

 

 Assessment  Nonintractable episodic headache,  R51    Active



   unspecified headache type      

 

 Problem  Constipation  K59.00    Active

 

 Assessment  Episodic tension-type headache, not  G44.219    Active



   intractable      

 

 Problem  Paraplegia  G82.20    Active

 

 Problem  Incontinence of urine  R32    Active

 

 Problem  Nausea alone  R11.0    Active

 

 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



   disorder      

 

 Problem  Mental disorder, not otherwise  F99    Active



   specified      

 

 Assessment  Bipolar depression  F31.30    Active

 

 Assessment  Lumbar spina bifida with  Q05.2    Active



   hydrocephalus      

 

 Problem  Bipolar depression  F31.30    Active

 

 Assessment   (ventriculoperitoneal) shunt  Z98.2    Active



   status      

 

 Problem  Depression with anxiety  F41.8    Active

 

 Problem  Fecal incontinence  R15.9    Active

 

 Problem  Nausea and vomiting, intractability  R11.2    Active



   of vomiting not specified,      



   unspecified vomiting type      

 

 Problem  Ulcer of left foot, unspecified  L97.529    Active



   ulcer stage      

 

 Problem  Episodic tension-type headache, not  G44.219    Active



   intractable      

 

 Problem  Nonintractable episodic headache,  R51    Active



   unspecified headache type      

 

 Assessment  Depression with anxiety  F41.8    Active

 

 Problem  Dependence on wheelchair  Z99.3    Active

 

 Problem  Lumbar spina bifida with  Q05.2    Active



   hydrocephalus      

 

 Problem   (ventriculoperitoneal) shunt  Z98.2    Active



   status      

 

 Problem  Nicotine dependence  F17.200    Active







Medications







 Medication  Code  Code  Instructions  Start  End  Status  Dosage



   System      Date  Date    

 

 Collagenase  NDC  [address]-28  250 UNIT/GM      Active  1 application



       Externally        to affected



       Once a day        area

 

 Macrobid  NDC  70876090991  100 MG Orally      Active  1 capsule



       every 12 hrs        with food

 

 Tylenol with  NDC  04397591319  300-60 MG      Active  1 tablet as



 Codeine #4      Orally every 6        needed



       hrs        

 

 Citalopram  NDC  49130084339  10 MG Orally  July    Active  1 tablet



 Hydrobromide      Once a day  2018      

 

 Pantoprazole  NDC  30309126068  40 MG Orally      Active  1 tablet



 Sodium      Once a day        

 

 Seroquel  NDC  78400896016  25 MG Orally  July    Active  1 tablet



       Once a day at  16,      



       bedtime        







Results

No Known Results



Summary Purpose

[employer] Submission

## 2020-01-07 NOTE — XMS REPORT
Patient Summary Document

 Created on:2020



Patient:JORDAN VERDIN

Sex:Male

:1985

External Reference #:[id number]





Demographics







 Address  [address]



   [place] [address]

 

 Home Phone  [phone] FAC

 

 Work Phone  [phone]

 

 Email Address  [phone]

 

 Preferred Language  Unknown

 

 Marital Status  Unknown

 

 Mormonism Affiliation  Unknown

 

 Race  Unknown

 

 Additional Race(s)  Unavailable

 

 Ethnic Group  Unknown









Author







 Organization  [facility]

 

 Address  [facility] [address] [place] [id number]

 

 Phone  [phone]









Care Team Providers







 Name  Role  Phone

 

 RAMU TORRES  Unavailable  Unavailable









Problems

This patient has no known problems.



Allergies, Adverse Reactions, Alerts

This patient has no known allergies or adverse reactions.



Medications

This patient has no known medications.



Results







 Test Description  Test Time  Test Comments  Text Results  Atomic Results  
Result Comments









 BLOOD CULTURE  2017 16:22:00    









   Test Item  Value  Reference Range  Comments









 CULTURE (BEAKER) (test code=1095)  No growth in 5 days    



BLOOD [id number] 16:22:00





 Test Item  Value  Reference Range  Comments

 

 CULTURE (BEAKER) (test code=1095)  No growth in 5 days    



(MANUAL DIFFERENTIAL)2017 22:29:00





 Test Item  Value  Reference Range  Comments

 

 TOTAL COUNTED (BEAKER) (test code=1351)      

 

 WBC MORPHOLOGY (BEAKER) (test code=487)  Normal    

 

 PLT MORPHOLOGY (BEAKER) (test code=486)  Normal    

 

 RBC MORPHOLOGY (BEAKER) (test code=762)  Normal    



CBC W/PLT COUNT &amp; AUTO [id number] 22:28:00





 Test Item  Value  Reference Range  Comments

 

 WHITE BLOOD CELL COUNT (BEAKER) (test code=775)  7.3 K/ L  4.0-10.0  

 

 RED BLOOD CELL COUNT (BEAKER) (test code=761)  5.09 M/ L  4.20-5.80  

 

 HEMOGLOBIN (BEAKER) (test code=410)  13.0 GM/DL  13.0-16.8  

 

 HEMATOCRIT (BEAKER) (test code=411)  42.3 %  40.0-50.0  

 

 MEAN CORPUSCULAR VOLUME (BEAKER) (test code=753)  83.0 fL  82.0-98.0  

 

 MEAN CORPUSCULAR HEMOGLOBIN (BEAKER) (test  25.6 pg  27.0-33.0  



 code=751)      

 

 MEAN CORPUSCULAR HEMOGLOBIN CONC (BEAKER) (test  30.8 GM/DL  32.0-36.0  



 code=752)      

 

 RED CELL DISTRIBUTION WIDTH (BEAKER) (test  18.0 %  10.3-14.2  



 code=412)      

 

 PLATELET COUNT (BEAKER) (test code=756)  331 K/CU MM  150-430  

 

 MEAN PLATELET VOLUME (BEAKER) (test code=754)  8.5 fL  6.5-10.5  

 

 NUCLEATED RED BLOOD CELLS (BEAKER) (test  0 /100 WBC  0-0  



 code=413)      

 

 NEUTROPHILS RELATIVE PERCENT (BEAKER) (test  65 %    



 code=429)      

 

 LYMPHOCYTES RELATIVE PERCENT (BEAKER) (test  23 %    



 code=430)      

 

 MONOCYTES RELATIVE PERCENT (BEAKER) (test  8 %    



 code=431)      

 

 EOSINOPHILS RELATIVE PERCENT (BEAKER) (test  3 %    



 code=432)      

 

 BASOPHILS RELATIVE PERCENT (BEAKER) (test  1 %    



 code=437)      

 

 NEUTROPHILS ABSOLUTE COUNT (BEAKER) (test  4.75 K/ L  1.80-8.00  



 code=670)      

 

 LYMPHOCYTES ABSOLUTE COUNT (BEAKER) (test  1.68 K/ L  1.48-4.50  



 code=414)      

 

 MONOCYTES ABSOLUTE COUNT (BEAKER) (test  0.55 K/ L  0.00-1.30  



 code=415)      

 

 EOSINOPHILS ABSOLUTE COUNT (BEAKER) (test  0.24 K/ L  0.00-0.50  



 code=416)      

 

 BASOPHILS ABSOLUTE COUNT (BEAKER) (test  0.05 K/ L  0.00-0.20  



 code=417)      



[id number] METABOLIC [id number] 11:11:00





 Test Item  Value  Reference Range  Comments

 

 SODIUM (BEAKER) (test  138 meq/L  136-145  



 deaz=744)      

 

 POTASSIUM (BEAKER) (test  4.0 meq/L  3.5-5.1  



 code=379)      

 

 CHLORIDE (BEAKER) (test  108 meq/L    



 code=382)      

 

 CO2 (BEAKER) (test  23 meq/L  22-29  



 code=355)      

 

 BLOOD UREA NITROGEN  11 mg/dL  7-21  



 (BEAKER) (test code=354)      

 

 CREATININE (BEAKER) (test  0.74 mg/dL  0.57-1.25  



 code=358)      

 

 GLUCOSE RANDOM (BEAKER)  124 mg/dL    



 (test code=652)      

 

 CALCIUM (BEAKER) (test  8.9 mg/dL  8.4-10.2  



 code=697)      

 

 EGFR (BEAKER) (test  150 mL/min/1.73 sq m    ESTIMATED GFR IS NOT AS



 code=1092)      ACCURATE AS CREATININE



       CLEARANCE IN PREDICTING



       GLOMERULAR FILTRATION



       RATE. ESTIMATED GFR IS



       NOT APPLICABLE FOR



       DIALYSIS PATIENTS.



URINE [id number] 09:56:00





 Test Item  Value  Reference Range  Comments

 

 CULTURE (BEAKER) (test code=1095)  <10,000 col/mL skin jaime    



COMPREHENSIVE METABOLIC [id number] 08:49:00





 Test Item  Value  Reference Range  Comments

 

 TOTAL PROTEIN (BEAKER)  7.3 gm/dL  6.0-8.3  



 (test code=770)      

 

 ALBUMIN (BEAKER) (test  3.3 g/dL  3.5-5.0  



 code=1145)      

 

 ALKALINE PHOSPHATASE  89 U/L    



 (BEAKER) (test code=346)      

 

 BILIRUBIN TOTAL (BEAKER)  1.4 mg/dL  0.2-1.2  



 (test code=377)      

 

 SODIUM (BEAKER) (test  137 meq/L  136-145  



 ulkl=140)      

 

 POTASSIUM (BEAKER) (test  3.7 meq/L  3.5-5.1  



 code=379)      

 

 CHLORIDE (BEAKER) (test  108 meq/L    



 code=382)      

 

 CO2 (BEAKER) (test  17 meq/L  22-29  



 code=355)      

 

 BLOOD UREA NITROGEN  12 mg/dL  7-21  



 (BEAKER) (test code=354)      

 

 CREATININE (BEAKER) (test  0.78 mg/dL  0.57-1.25  



 code=358)      

 

 GLUCOSE RANDOM (BEAKER)  119 mg/dL    



 (test code=652)      

 

 CALCIUM (BEAKER) (test  8.6 mg/dL  8.4-10.2  



 code=697)      

 

 AST (SGOT) (BEAKER) (test  13 U/L  5-34  



 code=353)      

 

 ALT (SGPT) (BEAKER) (test  28 U/L  6-55  



 code=347)      

 

 EGFR (BEAKER) (test  141 mL/min/1.73 sq    ESTIMATED GFR IS NOT AS



 code=1092)  m    ACCURATE AS CREATININE



       CLEARANCE IN PREDICTING



       GLOMERULAR FILTRATION



       RATE. ESTIMATED GFR IS



       NOT APPLICABLE FOR



       DIALYSIS PATIENTS.



CBC W/PLT COUNT &amp; AUTO [id number] 08:46:00





 Test Item  Value  Reference Range  Comments

 

 WHITE BLOOD CELL COUNT (BEAKER) (test code=775)  9.4 K/ L  4.0-10.0  

 

 RED BLOOD CELL COUNT (BEAKER) (test code=761)  4.79 M/ L  4.20-5.80  

 

 HEMOGLOBIN (BEAKER) (test code=410)  12.8 GM/DL  13.0-16.8  

 

 HEMATOCRIT (BEAKER) (test code=411)  40.0 %  40.0-50.0  

 

 MEAN CORPUSCULAR VOLUME (BEAKER) (test code=753)  83.4 fL  82.0-98.0  

 

 MEAN CORPUSCULAR HEMOGLOBIN (BEAKER) (test  26.7 pg  27.0-33.0  



 code=751)      

 

 MEAN CORPUSCULAR HEMOGLOBIN CONC (BEAKER) (test  32.0 GM/DL  32.0-36.0  



 code=752)      

 

 RED CELL DISTRIBUTION WIDTH (BEAKER) (test  18.2 %  10.3-14.2  



 code=412)      

 

 PLATELET COUNT (BEAKER) (test code=756)  313 K/CU MM  150-430  

 

 MEAN PLATELET VOLUME (BEAKER) (test code=754)  8.6 fL  6.5-10.5  

 

 NUCLEATED RED BLOOD CELLS (BEAKER) (test  0 /100 WBC  0-0  



 code=413)      

 

 NEUTROPHILS RELATIVE PERCENT (BEAKER) (test  70 %    



 code=429)      

 

 LYMPHOCYTES RELATIVE PERCENT (BEAKER) (test  16 %    



 code=430)      

 

 MONOCYTES RELATIVE PERCENT (BEAKER) (test  12 %    



 code=431)      

 

 EOSINOPHILS RELATIVE PERCENT (BEAKER) (test  1 %    



 code=432)      

 

 BASOPHILS RELATIVE PERCENT (BEAKER) (test  1 %    



 code=437)      

 

 NEUTROPHILS ABSOLUTE COUNT (BEAKER) (test  6.54 K/ L  1.80-8.00  



 code=670)      

 

 LYMPHOCYTES ABSOLUTE COUNT (BEAKER) (test  1.50 K/ L  1.48-4.50  



 code=414)      

 

 MONOCYTES ABSOLUTE COUNT (BEAKER) (test  1.13 K/ L  0.00-1.30  



 code=415)      

 

 EOSINOPHILS ABSOLUTE COUNT (BEAKER) (test  0.13 K/ L  0.00-0.50  



 code=416)      

 

 BASOPHILS ABSOLUTE COUNT (BEAKER) (test  0.06 K/ L  0.00-0.20  



 code=417)      



0.00URINALYSIS W/ [id number] 20:36:00





 Test Item  Value  Reference Range  Comments

 

 COLOR (BEAKER) (test code=470)  Yellow    

 

 CLARITY (BEAKER) (test code=469)  Hazy    

 

 SPECIFIC GRAVITY UA (BEAKER) (test code=468)  1.012  1.001-1.035  

 

 PH UA (BEAKER) (test code=467)  5.5  5.0-8.0  

 

 PROTEIN UA (BEAKER) (test code=464)  100 mg/dL  Negative  

 

 GLUCOSE UA (BEAKER) (test code=365)  Negative  Negative  

 

 KETONES UA (BEAKER) (test code=371)  Negative  Negative  

 

 BILIRUBIN UA (BEAKER) (test code=462)  Negative  Negative  

 

 BLOOD UA (BEAKER) (test code=461)  Moderate  Negative  

 

 NITRITE UA (BEAKER) (test code=465)  Negative  Negative  

 

 LEUKOCYTE ESTERASE UA (BEAKER) (test code=466)  Large  Negative  

 

 UROBILINOGEN UA (BEAKER) (test code=463)  3.0 mg/dL  0.2-1.0  

 

 RBC UA (BEAKER) (test code=519)  19 /HPF    

 

 WBC UA (BEAKER) (test code=520)  182 /HPF    

 

 SOURCE(BEAKER) (test code=7526)      



BASIC METABOLIC [id number] 17:00:00





 Test Item  Value  Reference Range  Comments

 

 SODIUM (BEAKER) (test  140 meq/L  136-145  



 gtqz=169)      

 

 POTASSIUM (BEAKER) (test  3.7 meq/L  3.5-5.1  



 code=379)      

 

 CHLORIDE (BEAKER) (test  112 meq/L    



 code=382)      

 

 CO2 (BEAKER) (test  17 meq/L  22-29  



 code=355)      

 

 BLOOD UREA NITROGEN  23 mg/dL  7-21  



 (BEAKER) (test code=354)      

 

 CREATININE (BEAKER) (test  1.00 mg/dL  0.57-1.25  



 code=358)      

 

 GLUCOSE RANDOM (BEAKER)  102 mg/dL    



 (test code=652)      

 

 CALCIUM (BEAKER) (test  8.7 mg/dL  8.4-10.2  



 code=697)      

 

 EGFR (BEAKER) (test  106 mL/min/1.73 sq m    ESTIMATED GFR IS NOT AS



 code=1092)      ACCURATE AS CREATININE



       CLEARANCE IN PREDICTING



       GLOMERULAR FILTRATION



       RATE. ESTIMATED GFR IS



       NOT APPLICABLE FOR



       DIALYSIS PATIENTS.



Specimen slightly ictericCBC W/PLT COUNT &amp; AUTO [id number] 12:18
:00





 Test Item  Value  Reference Range  Comments

 

 WHITE BLOOD CELL COUNT (BEAKER) (test code=775)  16.4 K/ L  4.0-10.0  

 

 RED BLOOD CELL COUNT (BEAKER) (test code=761)  5.22 M/ L  4.20-5.80  

 

 HEMOGLOBIN (BEAKER) (test code=410)  14.4 GM/DL  13.0-16.8  

 

 HEMATOCRIT (BEAKER) (test code=411)  42.9 %  40.0-50.0  

 

 MEAN CORPUSCULAR VOLUME (BEAKER) (test code=753)  82.2 fL  82.0-98.0  

 

 MEAN CORPUSCULAR HEMOGLOBIN (BEAKER) (test  27.5 pg  27.0-33.0  



 code=751)      

 

 MEAN CORPUSCULAR HEMOGLOBIN CONC (BEAKER) (test  33.5 GM/DL  32.0-36.0  



 code=752)      

 

 RED CELL DISTRIBUTION WIDTH (BEAKER) (test  16.2 %  10.3-14.2  



 code=412)      

 

 PLATELET COUNT (BEAKER) (test code=756)  329 K/CU MM  150-430  

 

 MEAN PLATELET VOLUME (BEAKER) (test code=754)  8.2 fL  6.5-10.5  

 

 NUCLEATED RED BLOOD CELLS (BEAKER) (test  0 /100 WBC  0-0  



 code=413)      

 

 NEUTROPHILS RELATIVE PERCENT (BEAKER) (test  83 %    



 code=429)      

 

 LYMPHOCYTES RELATIVE PERCENT (BEAKER) (test  7 %    



 code=430)      

 

 MONOCYTES RELATIVE PERCENT (BEAKER) (test  9 %    



 code=431)      

 

 EOSINOPHILS RELATIVE PERCENT (BEAKER) (test  0 %    



 code=432)      

 

 BASOPHILS RELATIVE PERCENT (BEAKER) (test  0 %    



 code=437)      

 

 NEUTROPHILS ABSOLUTE COUNT (BEAKER) (test  1.62 K/ L  1.80-8.00  



 code=670)      

 

 LYMPHOCYTES ABSOLUTE COUNT (BEAKER) (test  1.15 K/ L  1.48-4.50  



 code=414)      

 

 MONOCYTES ABSOLUTE COUNT (BEAKER) (test  1.56 K/ L  0.00-1.30  



 code=415)      

 

 EOSINOPHILS ABSOLUTE COUNT (BEAKER) (test  0.01 K/ L  0.00-0.50  



 code=416)      

 

 BASOPHILS ABSOLUTE COUNT (BEAKER) (test  0.02 K/ L  0.00-0.20  



 code=417)      



(MANUAL DIFFERENTIAL)2017 12:18:00





 Test Item  Value  Reference Range  Comments

 

 TOTAL COUNTED (BEAKER) (test code=1351)      

 

 WBC MORPHOLOGY (BEAKER) (test code=487)  Normal    

 

 PLT MORPHOLOGY (BEAKER) (test code=486)  Normal    

 

 RBC MORPHOLOGY (BEAKER) (test code=762)  Normal

## 2020-01-07 NOTE — EDPHYS
Physician Documentation                                                                           

 [facility]                                                                 

Name: Redd Dale Jr                                                                            

Age: 34 yrs                                                                                       

Sex: Male                                                                                         

: 1985                                                                                   

MRN: [id number]                                                                                   

Arrival Date: 2020                                                                          

Time: 10:21                                                                                       

Account#: [id number]                                                                            

Bed 2                                                                                             

Private MD:                                                                                       

ED Physician Juan Luis Lerner                                                                       

HPI:                                                                                              

                                                                                             

12:29 This 34 yrs old Black Male presents to ER via EMS with complaints of Nausea/Vomiting,   kdr 

      Headache.                                                                                   

12:29 The patient presents to the emergency department with nausea, vomiting, that is         kdr 

      intermittent. Onset: The symptoms/episode began/occurred gradually.                         

12:32 Onset: The symptoms/episode began/occurred gradually, 3 day(s) ago. Possible causes:  kdr 

      Shunt malfunction. The symptoms are aggravated by nothing. The symptoms are alleviated      

      by nothing. Associated signs and symptoms: Pertinent positives: nausea, vomiting,           

      Blurry vision. Severity of symptoms: At their worst the symptoms were mild moderate         

      just prior to arrival, in the emergency department the symptoms are unchanged. The          

      patient has experienced similar episodes in the past, a few times. The patient has not      

      recently seen a physician. The patient has a  shunt and was seen for possible           

      shunt issues in November for similar problems.                                              

                                                                                                  

Historical:                                                                                       

- Allergies:                                                                                      

10:23 Amoxicillin;                                                                            hb  

10:23 Bactrim;                                                                                hb  

10:23 Ciprofloxacin;                                                                          hb  

10:23 CLAVULANIC ACID;                                                                        hb  

10:23 Demerol;                                                                                hb  

10:23 Doxycycline;                                                                            hb  

10:23 Levofloxacin;                                                                           hb  

10:23 Morphine;                                                                               hb  

10:23 PENICILLINS;                                                                            hb  

10:23 Toradol;                                                                                hb  

10:23 TRIMETHOPRIM;                                                                           hb  

10:23 Vancomycin;                                                                             hb  

10:23 Zofran;                                                                                 hb  

- Home Meds:                                                                                      

10:23 Celexa 20 mg Oral tab 1 tab once daily [Active]; fentanyl 25 mcg/hr Topical pt72 1      hb  

      patch every 72 hours [Active]; Pepcid 20 mg Oral tab 1 tab once daily [Active];             

      Percocet  mg Oral tab 1 tab every 6 hours [Active]; Reglan 10 mg Oral tab 1 tab       

      once daily [Active]; Wellbutrin  mg Oral TbER 1 tab 2 times per day [Active];         

- PMHx:                                                                                           

10:23 Asthma; Cerebral Palsy; cluster headaches; decubitus ulcers on feet; GERD;              hb  

      Hydrocephalus; Hypertension; spina bifida;                                                  

- PSHx:                                                                                           

10:23  shunt; Heel Surgery L; Cholecystectomy;                                              hb  

                                                                                                  

- Immunization history:: Adult Immunizations up to date.                                          

- Social history:: Smoking status: Patient uses tobacco products, smokes one-half pack            

  cigarettes per day.                                                                             

- Ebola Screening: : No symptoms or risks identified at this time.                                

                                                                                                  

                                                                                                  

ROS:                                                                                              

12:32 Constitutional: Negative for fever, chills, and weight loss, Eyes: Negative for injury, kdr 

      pain, redness, and discharge, Neck: Negative for injury, pain, and swelling,                

      Cardiovascular: Negative for chest pain, palpitations, and edema, Respiratory: Negative     

      for shortness of breath, cough, wheezing, and pleuritic chest pain, Abdomen/GI:             

      Negative for abdominal pain, nausea, vomiting, diarrhea, and constipation, Back:            

      Negative for injury and pain, : Negative for injury, bleeding, discharge, and             

      swelling, MS/Extremity: Negative for injury and deformity, Skin: Negative for injury,       

      rash, and discoloration, Psych: Negative for depression, anxiety, suicide ideation,         

      homicidal ideation, and hallucinations, Allergy/Immunology: Negative for hives, rash,       

      and allergies, Endocrine: Negative for neck swelling, polydipsia, polyuria, polyphagia,     

      and marked weight changes, Hematologic/Lymphatic: Negative for swollen nodes, abnormal      

      bleeding, and unusual bruising.                                                             

12:32 Neuro: Positive for headache, visual changes, Negative for altered mental status,           

      hearing loss, numbness, seizure activity, speech changes, syncope, near syncope,            

      tinnitus.                                                                                   

                                                                                                  

Exam:                                                                                             

12:32 Constitutional:  This is a well developed, well nourished patient who is awake, alert,  kdr 

      and in no acute distress. Head/Face:  Normocephalic, atraumatic. Eyes:  Pupils equal        

      round and reactive to light, extra-ocular motions intact.  Lids and lashes normal.          

      Conjunctiva and sclera are non-icteric and not injected.  Cornea within normal limits.      

      Periorbital areas with no swelling, redness, or edema. Neck:  Trachea midline, no           

      thyromegaly or masses palpated, and no cervical lymphadenopathy.  Supple, full range of     

      motion without nuchal rigidity, or vertebral point tenderness.  No Meningismus.             

      Chest/axilla:  Normal chest wall appearance and motion.  Nontender with no deformity.       

      No lesions are appreciated. Cardiovascular:  Regular rate and rhythm with a normal S1       

      and S2.  No gallops, murmurs, or rubs.  Normal PMI, no JVD.  No pulse deficits.             

      Respiratory:  Lungs have equal breath sounds bilaterally, clear to auscultation and         

      percussion.  No rales, rhonchi or wheezes noted.  No increased work of breathing, no        

      retractions or nasal flaring. Abdomen/GI:  Soft, non-tender, with normal bowel sounds.      

      No distension or tympany.  No guarding or rebound.  No evidence of tenderness               

      throughout. Back:  No spinal tenderness.  No costovertebral tenderness.  Full range of      

      motion. Skin:  Warm, dry with normal turgor.  Normal color with no rashes, no lesions,      

      and no evidence of cellulitis. Psych:  Awake, alert, with orientation to person, place      

      and time.  Behavior, mood, and affect are within normal limits.                             

12:32 Neuro: Orientation: is normal, Mentation: is normal, Memory: is normal, Cranial nerves:     

      no acute changes, Cerebellar function: no acute changes, Motor: moves all fours,            

      Sensation: no acute changes, Gait: not tested. Vision blurry.                               

                                                                                                  

Vital Signs:                                                                                      

10:23  / 83; Pulse 114; Resp 16; Temp 97.1(TE); Pulse Ox 96% ; Weight 136.08 kg; Height hb  

      4 ft. 11 in. (149.86 cm); Pain 9/10;                                                        

11:18  / 71; Pulse 97; Resp 16; Pulse Ox 96% ;                                          hb  

12:05  / 94; Pulse 102; Resp 18; Pulse Ox 99% on 3 lpm NC;                              mg2 

12:15 Pulse Ox 92% on 3 lpm NC;                                                               hb  

12:45  / 91; Pulse 111; Resp 15; Temp 97.3(TE); Pulse Ox 97% on 4 lpm NC;               hb  

14:15  / 82; Pulse 102; Resp 17; Pulse Ox 97% on 4 lpm NC; Pain 9/10;                   hb  

15:45  / 76; Pulse 105; Resp 19; Pulse Ox 97% on 4 lpm NC;                              hb  

16:45  / 63; Pulse 100; Resp 17; Pulse Ox 96% on 4 lpm NC;                              hb  

10:23 Body Mass Index 60.59 (136.08 kg, 149.86 cm)                                              

                                                                                                  

MDM:                                                                                              

12:56 Patient medically screened.                                                             kdr 

12:57 Data reviewed: vital signs, nurses notes, lab test result(s), radiologic studies.       kdr 

      Counseling: I had a detailed discussion with the patient and/or guardian regarding: the     

      historical points, exam findings, and any diagnostic results supporting the                 

      discharge/admit diagnosis, lab results, radiology results, the need to transfer to          

      another facility.                                                                           

16:08 ED course: The patient continues to be stable in the ED. VSS, He still c/o mild HA but  kdr 

      has not requested any further medication for his HA.                                        

                                                                                                  

                                                                                             

10:23 Order name: Basic Metabolic Panel; Complete Time: 12:29                                 kdr 

                                                                                             

10:23 Order name: CBC with Diff; Complete Time: 11:16                                         kdr 

                                                                                             

10:23 Order name: Creatinine for Radiology; Complete Time: 11:16                              kdr 

                                                                                             

10:23 Order name: Hepatic Function; Complete Time: 12:29                                      [facility] 

                                                                                             

10:23 Order name: Lipase; Complete Time: 12:29                                                [facility] 

                                                                                             

14:31 Order name: Glucose, Ancillary Testing; Complete Time: 17:57                            [facility]

                                                                                             

10:37 Order name: CT Head Brain wo Cont; Complete Time: 11:16                                 [facility] 

                                                                                             

12:37 Order name: Shuntogram XRAY; Complete Time: 17:57                                       [facility] 

                                                                                             

10:23 Order name: IV Saline Lock; Complete Time: 10:33                                        kdr 

                                                                                             

10:23 Order name: Labs collected and sent; Complete Time: 10:33                               [facility] 

                                                                                                  

Administered Medications:                                                                         

10:33 Drug: Pepcid 20 mg Route: IVP; Site: right forearm;                                     mg2 

11:10 Follow up: Response: No adverse reaction                                                hb  

10:34 Drug: NS 0.9% 1000 ml Route: IV; Rate: 1 bolus; Site: right forearm;                    mg2 

11:30 Follow up: Response: No adverse reaction; IV Status: Completed infusion; IV Intake:     hb  

      1000ml                                                                                      

10:34 Drug: Phenergan 12.5 mg Route: IVP; Site: right forearm;                                mg2 

11:10 Follow up: Response: No adverse reaction                                                hb  

10:46 Drug: Dilaudid 1 mg Route: IVP; Site: right forearm;                                    hb  

11:25 Follow up: Response: RASS: Drowsy (-1)                                                  hb  

13:22 Drug: Insulin Regular Human 10 units {Co-Signature: iw (Renetta Terry RN).} Route:     mg2 

      IVP; Site: right forearm;                                                                   

14:20 Follow up: Response: No adverse reaction; Blood sugar is lowered                        hb  

14:27 Drug: Dilaudid 1 mg Route: IVP; Site: right forearm;                                    hb  

17:27 Follow up: Response: No adverse reaction; Marked relief of symptoms                     mg2 

14:29 Drug: Insulin Regular Human 5 units {Co-Signature: mg2 (Miguel Skaggs RN).} Route:    hb  

      IVP; Site: right forearm;                                                                   

17:27 Follow up: Response: No adverse reaction; Blood sugar is lowered                        mg2 

17:27 Drug: Dilaudid 1 mg Route: IVP; Site: right forearm;                                    hb  

17:28 Follow up: Response: Medication administered at discharge.                              hb  

                                                                                                  

                                                                                                  

Point of Care Testing:                                                                            

      Blood Glucose:                                                                              

14:22 Blood Glucose: 378 mg/dL;                                                               hb  

      Ranges:                                                                                     

      Critical Glucose Levels:Adult <50 mg/dl or >400 mg/dl  <40 mg/dl or >180 mg/dl       

Disposition:                                                                                      

20 12:56 Transfer ordered to [facility]. Diagnosis is  Shunt          

  Malfunction.                                                                                    

- Reason for transfer: Higher level of care.                                                      

- Accepting physician is Ashia.                                                                 

- Condition is Fair.                                                                              

- Problem is an acute exacerbation.                                                               

- Symptoms are unchanged.                                                                         

                                                                                                  

                                                                                                  

                                                                                                  

Signatures:                                                                                       

Dispatcher [employer]                           Juan Luis Barker MD MD   kdr                                                  

Josefa Bryson RN RN                                                      

Miguel Skaggs RN RN   mg2                                                  

Renetta Trery RN                            iw                                                   

Miguel Skaggs RN                           mg2                                                  

                                                                                                  

Corrections: (The following items were deleted from the chart)                                    

14:19 12:56 2020 12:56 Transfer ordered to [facility].       kdr 

      Diagnosis is  Shunt Malfunction. Reason for transfer: Higher level of care. Accepting     

      physician is aníbal. Condition is Fair. Problem is an acute exacerbation. Symptoms are           

      unchanged. kdr                                                                              

17:28 14:19 2020 12:56 Transfer ordered to [facility]. Diagnosis is mg2 

       Shunt Malfunction. Reason for transfer: Higher level of care. Accepting physician is     

      Ashia. Condition is Fair. Problem is an acute exacerbation. Symptoms are unchanged.       

      kdr                                                                                         

                                                                                                  

**************************************************************************************************

## 2020-01-07 NOTE — XMS REPORT
[facility]

 Created on:September 15, 2018



Patient:Redd Dale

Sex:Male

:1985

External Reference #:[id number]





Demographics







 Address  [address] [place] [address]

 

 Phone  [phone]

 

 Preferred Language  en

 

 Marital Status  Unknown

 

 Congregation Affiliation  Unknown

 

 Race  Black or 

 

 Ethnic Group  Unknown









Author







 Organization  [employer]









Care Team Providers







 Name  Role  Phone

 

 Knox, Na  Provider Role  Unavailable









Allergies, Adverse Reactions, Alerts







 Substance  Reaction  Event Type

 

 Zofran  Info Not Available  Drug Allergy

 

 Morphine Sulfate ER  Info Not Available  Drug Allergy

 

 Levaquin  Info Not Available  Drug Allergy







Problems







 Problem Type  Condition  Code  Onset Dates  Condition Status

 

 Assessment  Blood tests for routine general  Z00.00    Active



   physical examination      

 

 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



   disorder      

 

 Problem  Constipation  K59.00    Active

 

 Assessment  Ulcer of left foot, unspecified  L97.529    Active



   ulcer stage      

 

 Problem  Paraplegia  G82.20    Active

 

 Assessment   (ventriculoperitoneal) shunt  Z98.2    Active



   status      

 

 Problem  Nausea alone  R11.0    Active

 

 Problem  Fecal incontinence  R15.9    Active

 

 Problem  Incontinence of urine  R32    Active

 

 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



   disorder      

 

 Problem  Mental disorder, not otherwise  F99    Active



   specified      

 

 Assessment  Depression with anxiety  F41.8    Active

 

 Assessment  Bipolar depression  F31.30    Active

 

 Problem  Bipolar depression  F31.30    Active

 

 Assessment  Lumbar spina bifida with  Q05.2    Active



   hydrocephalus      

 

 Problem  Blood tests for routine general  Z00.00    Active



   physical examination      

 

 Problem  Depression with anxiety  F41.8    Active

 

 Problem  Nausea and vomiting, intractability  R11.2    Active



   of vomiting not specified,      



   unspecified vomiting type      

 

 Problem  Ulcer of left foot, unspecified  L97.529    Active



   ulcer stage      

 

 Problem  Nonintractable episodic headache,  R51    Active



   unspecified headache type      

 

 Problem   (ventriculoperitoneal) shunt  Z98.2    Active



   status      

 

 Problem  Episodic tension-type headache, not  G44.219    Active



   intractable      

 

 Problem  Lumbar spina bifida with  Q05.2    Active



   hydrocephalus      

 

 Problem  Foot ulcer  L97.509    Active

 

 Problem  Nicotine dependence  F17.200    Active

 

 Problem  Dependence on wheelchair  Z99.3    Active







Medications







 Medication  Code  Code  Instructions  Start  End  Status  Dosage



   System      Date  Date    

 

 Macrobid  NDC  85396563561  100 MG Orally      Active  1 capsule



       every 12 hrs        with food

 

 Tylenol with  NDC  71936033102  300-60 MG      Active  1 tablet as



 Codeine #4      Orally every 6        needed



       hrs        

 

 Pantoprazole  NDC  46478754821  40 MG Orally      Active  1 tablet



 Sodium      Once a day        

 

 Citalopram  NDC  10687965926  10 MG Orally      Active  1 tablet



 Hydrobromide      Once a day        

 

 Collagenase  NDC  [address]-75  250 UNIT/GM      Active  1 application



       Externally        to affected



       Once a day        area

 

 Seroquel  NDC  12014183440  25 MG Orally      Active  1 tablet



       Once a day at        



       bedtime        







Results

No Known Results



Summary Purpose

[employer] Submission

## 2020-01-07 NOTE — RAD REPORT
EXAM DESCRIPTION:

RAD - Shuntogram - 1/7/2020 2:18 pm

 

CLINICAL HISTORY:  Blurry vision

 

FINDINGS:  Comparison is made to a January 2019 exam

 

Functional and nonfunctional shunt tubing is again demonstrated.

 

2 kinks of the tubing are present within the right abdomen.

 

The tip of the peritoneal shunt lies within the pelvis near midline

## 2020-01-07 NOTE — RAD REPORT
EXAM DESCRIPTION:  CT - Head Brain Wo Cont - 1/7/2020 11:01 am

 

CLINICAL HISTORY:  blurry vision and HA

Headache, drowsiness

 

COMPARISON:  Head Brain Wo Cont dated 1/18/2019; Head Brain Wo Cont dated 11/8/2018; Head C Spine Mpr
 Wo Con dated 2/20/2019

 

TECHNIQUE:  All CT scans are performed using dose optimization technique as appropriate and may inclu
de automated exposure control or mA/KV adjustment according to patient size.

 

FINDINGS:  No intracranial hemorrhage or extra-axial fluid collection.Two ventriculostomy tubes are p
resent, unchanged in position.The frontal horns of the lateral ventricle superior slightly more promi
nent in size relative to comparative study. This could indicate early sign of shunt malfunction.

 

The paranasal sinuses and mastoids are clear. The calvarium is intact.

 

IMPRESSION:  The frontal horns of the lateral ventricles appear slightly more prominent in size dileep
red to prior examinations. This may represent an early sign of shunt malfunction.  No acute hemorrhag
e seen.

## 2020-01-07 NOTE — XMS REPORT
[facility]

 Created on:2018



Patient:Redd Dale

Sex:Male

:1985

External Reference #:[id number]





Demographics







 Address  [address] [place] [address]

 

 Phone  [phone]

 

 Preferred Language  en

 

 Marital Status  Unknown

 

 Taoist Affiliation  Unknown

 

 Race  Black or 

 

 Ethnic Group  Not  or 









Author







 Organization  [email or web address]









Care Team Providers







 Name  Role  Phone

 

 Knox, Na  Provider Role  Unavailable









Allergies

No Known Allergies



Problems







 Problem Type  Condition  Code  Onset Dates  Condition Status

 

 Problem  Nicotine dependence  F17.200    Active

 

 Problem  Lumbar spina bifida with  Q05.2    Active



   hydrocephalus      

 

 Problem  Dependence on wheelchair  Z99.3    Active

 

 Problem  Fecal incontinence  R15.9    Active

 

 Problem  Foot ulcer  L97.509    Active

 

 Problem  Depression with anxiety  F41.8    Active

 

 Problem  Paraplegia  G82.20    Active

 

 Problem  Constipation  K59.00    Active

 

 Problem  Incontinence of urine  R32    Active

 

 Problem  Nausea alone  R11.0    Active

 

 Problem  Episodic tension-type headache, not  G44.219    Active



   intractable      

 

 Problem  Nonintractable episodic headache,  R51    Active



   unspecified headache type      

 

 Problem   (ventriculoperitoneal) shunt  Z98.2    Active



   status      







Medications

No Known Medications



Results

No Known Results



Summary Purpose

[employer] Submission

## 2020-01-07 NOTE — XMS REPORT
[facility]

 Created on:2018



Patient:Redd Dale

Sex:Male

:1985

External Reference #:[id number]





Demographics







 Address  [address] [place] [address]

 

 Phone  [phone]

 

 Preferred Language  en

 

 Marital Status  Unknown

 

 Presybeterian Affiliation  Unknown

 

 Race  Black or 

 

 Ethnic Group  Not  or 









Author







 Organization  [email or web address]









Care Team Providers







 Name  Role  Phone

 

 Knox, Na  Provider Role  Unavailable









Allergies

No Known Allergies



Problems







 Problem Type  Condition  Code  Onset Dates  Condition Status

 

 Problem  Nicotine dependence  F17.200    Active

 

 Problem  Lumbar spina bifida with  Q05.2    Active



   hydrocephalus      

 

 Problem  Dependence on wheelchair  Z99.3    Active

 

 Problem  Fecal incontinence  R15.9    Active

 

 Problem  Foot ulcer  L97.509    Active

 

 Problem  Depression with anxiety  F41.8    Active

 

 Problem  Paraplegia  G82.20    Active

 

 Problem  Constipation  K59.00    Active

 

 Problem  Incontinence of urine  R32    Active

 

 Problem  Nausea alone  R11.0    Active

 

 Problem  Episodic tension-type headache, not  G44.219    Active



   intractable      

 

 Problem  Nonintractable episodic headache,  R51    Active



   unspecified headache type      

 

 Problem   (ventriculoperitoneal) shunt  Z98.2    Active



   status      







Medications

No Known Medications



Results

No Known Results



Summary Purpose

[employer] Submission

## 2020-01-07 NOTE — XMS REPORT
[facility]

 Created on:2018



Patient:Redd Dale

Sex:Male

:1985

External Reference #:[id number]





Demographics







 Address  [address]  [place] [address]

 

 Phone  [phone]

 

 Preferred Language  en

 

 Marital Status  Unknown

 

 Voodoo Affiliation  Unknown

 

 Race  Black or 

 

 Ethnic Group  Not  or 









Author







 Organization  [employer]









Care Team Providers







 Name  Role  Phone

 

 Knox, Na  Provider Role  Unavailable









Allergies, Adverse Reactions, Alerts







 Substance  Reaction  Event Type

 

 Toradol  Info Not Available  Drug Allergy

 

 Sulfa  Info Not Available  Drug Allergy

 

 Zofran  Info Not Available  Drug Allergy

 

 Morphine Sulfate ER  Info Not Available  Drug Allergy

 

 Levaquin  Info Not Available  Drug Allergy







Problems







 Problem Type  Condition  Code  Onset Dates  Condition Status

 

 Problem  Nicotine dependence  F17.200    Active

 

 Problem  Lumbar spina bifida with  Q05.2    Active



   hydrocephalus      

 

 Problem  Dependence on wheelchair  Z99.3    Active

 

 Problem  Fecal incontinence  R15.9    Active

 

 Problem  Foot ulcer  L97.509    Active

 

 Problem  Depression with anxiety  F41.8    Active

 

 Problem  Paraplegia  G82.20    Active

 

 Problem  Constipation  K59.00    Active

 

 Problem  Incontinence of urine  R32    Active

 

 Problem  Nausea alone  R11.0    Active

 

 Assessment  Episodic tension-type headache, not  G44.219    Active



   intractable      

 

 Assessment   (ventriculoperitoneal) shunt  Z98.2    Active



   status      

 

 Assessment  Ulcer of left foot, unspecified  L97.529    Active



   ulcer stage      

 

 Assessment  Nonintractable episodic headache,  R51    Active



   unspecified headache type      

 

 Assessment  Depression with anxiety  F41.8    Active

 

 Problem  Episodic tension-type headache, not  G44.219    Active



   intractable      

 

 Assessment  Lumbar spina bifida with  Q05.2    Active



   hydrocephalus      

 

 Problem  Nonintractable episodic headache,  R51    Active



   unspecified headache type      

 

 Assessment  Nausea and vomiting, intractability  R11.2    Active



   of vomiting not specified,      



   unspecified vomiting type      

 

 Problem   (ventriculoperitoneal) shunt  Z98.2    Active



   status      







Medications







 Medication  Code  Code  Instructions  Start  End  Status  Dosage



   System      Date  Date    

 

 Tylenol with  NDC  22128597816  300-60 MG      Active  1 tablet as



 Codeine #4      Orally every 6        needed



       hrs        

 

 Macrobid  NDC  02648783320  100 MG Orally      Active  1 capsule



       every 12 hrs        with food

 

 Pantoprazole  NDC  92589629882  40 MG Orally      Active  1 tablet



 Sodium      Once a day        

 

 Collagenase  NDC  [address]-34  250 UNIT/GM      Active  1 application



       Externally        to affected



       Once a day        area







Results

No Known Results



Summary Purpose

[employer] Submission

## 2020-06-25 ENCOUNTER — HOSPITAL ENCOUNTER (EMERGENCY)
Dept: HOSPITAL 97 - ER | Age: 35
Discharge: HOME | End: 2020-06-25
Payer: MEDICAID

## 2020-06-25 VITALS — SYSTOLIC BLOOD PRESSURE: 102 MMHG | DIASTOLIC BLOOD PRESSURE: 58 MMHG

## 2020-06-25 VITALS — OXYGEN SATURATION: 98 % | TEMPERATURE: 98.4 F

## 2020-06-25 DIAGNOSIS — Z88.8: ICD-10-CM

## 2020-06-25 DIAGNOSIS — Z88.5: ICD-10-CM

## 2020-06-25 DIAGNOSIS — Z88.1: ICD-10-CM

## 2020-06-25 DIAGNOSIS — G80.9: ICD-10-CM

## 2020-06-25 DIAGNOSIS — R07.9: Primary | ICD-10-CM

## 2020-06-25 DIAGNOSIS — I10: ICD-10-CM

## 2020-06-25 DIAGNOSIS — Z88.0: ICD-10-CM

## 2020-06-25 DIAGNOSIS — F17.210: ICD-10-CM

## 2020-06-25 LAB
ALBUMIN SERPL BCP-MCNC: 3.4 G/DL (ref 3.4–5)
ALP SERPL-CCNC: 119 U/L (ref 45–117)
ALT SERPL W P-5'-P-CCNC: 112 U/L (ref 12–78)
AST SERPL W P-5'-P-CCNC: 29 U/L (ref 15–37)
BUN BLD-MCNC: 20 MG/DL (ref 7–18)
GLUCOSE SERPLBLD-MCNC: 125 MG/DL (ref 74–106)
HCT VFR BLD CALC: 50.2 % (ref 39.6–49)
INR BLD: 1
LYMPHOCYTES # SPEC AUTO: 1.9 K/UL (ref 0.7–4.9)
MAGNESIUM SERPL-MCNC: 2 MG/DL (ref 1.8–2.4)
NT-PROBNP SERPL-MCNC: 10 PG/ML (ref ?–125)
PMV BLD: 8.8 FL (ref 7.6–11.3)
POTASSIUM SERPL-SCNC: 5 MMOL/L (ref 3.5–5.1)
RBC # BLD: 6.05 M/UL (ref 4.33–5.43)
TROPONIN (EMERG DEPT USE ONLY): < 0.02 NG/ML (ref 0–0.04)

## 2020-06-25 PROCEDURE — 71045 X-RAY EXAM CHEST 1 VIEW: CPT

## 2020-06-25 PROCEDURE — 80076 HEPATIC FUNCTION PANEL: CPT

## 2020-06-25 PROCEDURE — 93005 ELECTROCARDIOGRAM TRACING: CPT

## 2020-06-25 PROCEDURE — 84484 ASSAY OF TROPONIN QUANT: CPT

## 2020-06-25 PROCEDURE — 36415 COLL VENOUS BLD VENIPUNCTURE: CPT

## 2020-06-25 PROCEDURE — 96374 THER/PROPH/DIAG INJ IV PUSH: CPT

## 2020-06-25 PROCEDURE — 85025 COMPLETE CBC W/AUTO DIFF WBC: CPT

## 2020-06-25 PROCEDURE — 80048 BASIC METABOLIC PNL TOTAL CA: CPT

## 2020-06-25 PROCEDURE — 85610 PROTHROMBIN TIME: CPT

## 2020-06-25 PROCEDURE — 83735 ASSAY OF MAGNESIUM: CPT

## 2020-06-25 PROCEDURE — 83880 ASSAY OF NATRIURETIC PEPTIDE: CPT

## 2020-06-25 PROCEDURE — 99284 EMERGENCY DEPT VISIT MOD MDM: CPT

## 2020-06-25 NOTE — RAD REPORT
EXAM DESCRIPTION:  [facility] Single View6/25/2020 2:15 pm

 

CLINICAL HISTORY:  Chest pain

 

COMPARISON:  2019

 

FINDINGS:   The lungs appear clear of acute infiltrate. The heart is normal size

 

IMPRESSION:   No acute abnormalities displayed

## 2020-06-25 NOTE — ER
Nurse's Notes                                                                                     

 [facility]                                                                 

Name: Redd Dale Jr                                                                            

Age: 34 yrs                                                                                       

Sex: Male                                                                                         

: 1985                                                                                   

MRN: [id number]                                                                                   

Arrival Date: 2020                                                                          

Time: 12:35                                                                                       

Account#: [id number]                                                                            

Bed 17                                                                                            

Private MD:                                                                                       

Diagnosis: Chest pain, unspecified                                                                

                                                                                                  

Presentation:                                                                                     

                                                                                             

12:25 Chief complaint: EMS states: Chest pain that started at 5pm last night in the middle of ah  

      his chest and radiates thru to the back, 7/10. Nonproductive cough 3-5 days and body        

      aches today. 98.6, 96%, 150BGL per EMS. Coronavirus screen: Surgical mask placed on         

      patient. Patient moved to private room, placed in contact and droplet isolation with        

      eye protection until further assessment. Patient reports a cough. Patient denies            

      shortness of breath or difficulty breathing. Patient denies measured and/or subjective      

      temperature greater than 100.4F prior to today's visit. Patient denies travel on a          

      cruise ship or to a country the [facility] currently lists as an affected area. Patient denies     

      contact with known and/or suspected case of COVID-19. Ebola Screen: No symptoms or          

      risks identified at this time. Initial Sepsis Screen: Does the patient meet any 2           

      criteria? No. Patient's initial sepsis screen is negative. Does the patient have a          

      suspected source of infection? No. Patient's initial sepsis screen is negative. Risk        

      Assessment: Do you want to hurt yourself or someone else? Patient reports no desire to      

      harm self or others. Onset of symptoms was 2020 at 17:00. Care prior to            

      arrival: Medication(s) given: ASA, 81 mg, x 4, Nitroglycerin, 0.4 mg SL x 1.                

12:25 Method Of Arrival: EMS: [facility]                                                  

12:25 Acuity: AYESHA 3                                                                           ah  

                                                                                                  

Historical:                                                                                       

- Allergies:                                                                                      

13:42 Amoxicillin;                                                                              

13:42 Bactrim;                                                                                ah  

13:42 Ciprofloxacin;                                                                          ah  

13:42 CLAVULANIC ACID;                                                                        ah  

13:42 Demerol;                                                                                ah  

13:42 Doxycycline;                                                                              

13:42 Levofloxacin;                                                                             

13:42 Morphine;                                                                                 

13:42 PENICILLINS;                                                                              

13:42 Toradol;                                                                                ah  

13:42 TRIMETHOPRIM;                                                                           ah  

13:42 Vancomycin;                                                                             ah  

13:42 Zofran;                                                                                   

- Home Meds:                                                                                      

13:42 Celexa 20 mg Oral tab 1 tab once daily [Active]; fentanyl 25 mcg/hr Topical pt72 1      ah  

      patch every 72 hours [Active]; Pepcid 20 mg Oral tab 1 tab once daily [Active];             

      Percocet  mg Oral tab 1 tab every 6 hours [Active]; Reglan 10 mg Oral tab 1 tab       

      once daily [Active]; Wellbutrin  mg Oral TbER 1 tab 2 times per day [Active];         

- PMHx:                                                                                           

13:42 Asthma; Cerebral Palsy; cluster headaches; decubitus ulcers on feet; GERD;              ah  

      Hydrocephalus; Hypertension; spina bifida;                                                  

- PSHx:                                                                                           

13:42  shunt; Heel Surgery L; Cholecystectomy;                                              ah  

                                                                                                  

- Immunization history:: Adult Immunizations up to date.                                          

- Social history:: Smoking status: Patient reports the use of cigarette tobacco                   

  products, smokes one-half pack cigarettes per day.                                              

                                                                                                  

                                                                                                  

Screenin:43 Abuse screen: Denies threats or abuse. Nutritional screening: No deficits noted.        ah  

      Tuberculosis screening: No symptoms or risk factors identified. Fall Risk Secondary         

      diagnosis (15 points) impaired mobility, IV access (20 points). Ambulatory Aid- Total       

      Kim Fall Scale indicates Low Risk Score (25-44 pts). Side Rails Up X 2 Placed close       

      to Nursing Station Frequent Obs/Assesments occuring As available Patient and Family         

      Educated on Fall Prevention Program and strategies.                                         

                                                                                                  

Assessment:                                                                                       

13:00 General: Appears in no apparent distress. Behavior is calm, cooperative, appropriate    ah  

      for age. Pain: Complains of pain in xyphoid area and mid-sternal area Pain radiates to      

      back Pain currently is 7 out of 10 on a pain scale. Quality of pain is described as         

      aching, Pain began 1 day ago. Is continuous. Neuro: Level of Consciousness is awake,        

      alert, obeys commands, Oriented to person, place, time, situation, Appropriate for age.     

14:00 Reassessment: Patient and/or family updated on plan of care and expected duration. Pain ah  

      level reassessed. Patient is alert, oriented x 3, equal unlabored respirations, skin        

      warm/dry/pink. no needs voiced at this time.                                                

                                                                                                  

Vital Signs:                                                                                      

12:25 Pulse 103; Resp 18; Temp 98.4; Pulse Ox 98% on R/A; Weight 172.37 kg; Height 4 ft. 11   ah  

      in. (149.86 cm); Pain 7/10;                                                                 

13:00  / 58; Pulse 100; Resp 12; Pulse Ox 98% ;                                         ah  

12:25 Body Mass Index 76.75 (172.37 kg, 149.86 cm)                                              

                                                                                                  

ED Course:                                                                                        

12:35 Patient arrived in ED.                                                                  ph  

12:43 Ban Donohue, RN is Primary Nurse.                                                         

12:48 Triage completed.                                                                         

12:56 Washington Boyle MD is Attending Physician.                                             [facility] 

14:02 Patient has correct armband on for positive identification. Bed in low position. Call     

      light in reach. Side rails up X2. Cardiac monitor on. Pulse ox on. NIBP on.                 

14:15 XRAY Chest (1 view) In Process Unspecified.                                             EDMS

16:00 No provider procedures requiring assistance completed. IV discontinued, intact,           

      bleeding controlled, No redness/swelling at site. Pressure dressing applied.                

                                                                                                  

Administered Medications:                                                                         

15:35 Drug: Dilaudid 1 mg Route: IVP; Site: right forearm;                                    ph  

16:35 Follow up: Response: No adverse reaction                                                  

                                                                                                  

                                                                                                  

Outcome:                                                                                          

15:53 Discharge ordered by MD.                                                                [facility] 

16:20 Discharged to nursing home.                                                               

16:20 Condition: good                                                                             

16:20 Discharge instructions given to patient, Instructed on discharge instructions, follow       

      up and referral plans. Demonstrated understanding of instructions, follow-up care.          

17:21 Patient left the ED.                                                                    ph  

                                                                                                  

Signatures:                                                                                       

Dispatcher [employer]                           EDMS                                                 

Isabel Smith RN                      RN                                                      

Ban Donohue RN                         RN                                                      

Washington Boyle MD MD   [facility]                                                  

                                                                                                  

Corrections: (The following items were deleted from the chart)                                    

13:40 12:25 Coronavirus screen: Patient reports a cough. Patient denies shortness of breath   ah  

      or difficulty breathing. Patient denies measured and/or subjective temperature greater      

      than 100.4F prior to today's visit. Patient denies travel on a cruise ship or to a          

      country the [facility] currently lists as an affected area. Patient denies contact with known      

      and/or suspected case of COVID-19.                                                        

                                                                                                  

**************************************************************************************************

## 2020-06-25 NOTE — EDPHYS
Physician Documentation                                                                           

 [facility]                                                                 

Name: Redd Dale Jr                                                                            

Age: 34 yrs                                                                                       

Sex: Male                                                                                         

: 1985                                                                                   

MRN: [id number]                                                                                   

Arrival Date: 2020                                                                          

Time: 12:35                                                                                       

Account#: [id number]                                                                            

Bed 17                                                                                            

Private MD:                                                                                       

ED Physician Washington Boyle                                                                      

HPI:                                                                                              

                                                                                             

13:42 This 34 yrs old Black Male presents to ER via EMS with complaints of Chest pain.        [facility] 

13:42 The patient or guardian reports chest pain that is located primarily in the substernal  mh7 

      area. The pain radiates to back. Associated signs and symptoms: Pertinent positives:        

      cough, Pertinent negatives: abdominal pain, diaphoresis, dizziness, headache, lower         

      extremity pain, lower extremity swelling, lightheadedness, nausea, near syncope,            

      palpitations, recent travel, shortness of breath, syncope, vomiting. The chest pain is      

      described as sharp. Duration: The patient or guardian reports multiple episodes, that       

      are intermittent, that wax and wane, with no pattern. Modifying factors: The symptoms       

      are alleviated by remaining still, the symptoms are aggravated by movement. Severity of     

      pain: At its worst the pain was moderate yesterday, in the emergency department the         

      pain has improved moderately.                                                               

                                                                                                  

Historical:                                                                                       

- Allergies:                                                                                      

13:42 Amoxicillin;                                                                            ah  

13:42 Bactrim;                                                                                ah  

13:42 Ciprofloxacin;                                                                          ah  

13:42 CLAVULANIC ACID;                                                                        ah  

13:42 Demerol;                                                                                ah  

13:42 Doxycycline;                                                                            ah  

13:42 Levofloxacin;                                                                           ah  

13:42 Morphine;                                                                               ah  

13:42 PENICILLINS;                                                                            ah  

13:42 Toradol;                                                                                ah  

13:42 TRIMETHOPRIM;                                                                           ah  

13:42 Vancomycin;                                                                             ah  

13:42 Zofran;                                                                                   

- Home Meds:                                                                                      

13:42 Celexa 20 mg Oral tab 1 tab once daily [Active]; fentanyl 25 mcg/hr Topical pt72 1      ah  

      patch every 72 hours [Active]; Pepcid 20 mg Oral tab 1 tab once daily [Active];             

      Percocet  mg Oral tab 1 tab every 6 hours [Active]; Reglan 10 mg Oral tab 1 tab       

      once daily [Active]; Wellbutrin  mg Oral TbER 1 tab 2 times per day [Active];         

- PMHx:                                                                                           

13:42 Asthma; Cerebral Palsy; cluster headaches; decubitus ulcers on feet; GERD;              ah  

      Hydrocephalus; Hypertension; spina bifida;                                                  

- PSHx:                                                                                           

13:42  shunt; Heel Surgery L; Cholecystectomy;                                              ah  

                                                                                                  

- Immunization history:: Adult Immunizations up to date.                                          

- Social history:: Smoking status: Patient reports the use of cigarette tobacco                   

  products, smokes one-half pack cigarettes per day.                                              

                                                                                                  

                                                                                                  

ROS:                                                                                              

13:42 Constitutional: Negative for fever, chills, and weight loss, Eyes: Negative for injury, mh7 

      pain, redness, and discharge, ENT: Negative for injury, pain, and discharge, Neck:          

      Negative for injury, pain, and swelling, Abdomen/GI: Negative for abdominal pain,           

      nausea, vomiting, diarrhea, and constipation, Back: Negative for injury and pain, :       

      Negative for injury, bleeding, discharge, and swelling, MS/Extremity: Negative for          

      injury and deformity, Skin: Negative for injury, rash, and discoloration, Neuro:            

      Negative for headache, weakness, numbness, tingling, and seizure, Psych: Negative for       

      depression, anxiety, suicide ideation, homicidal ideation, and hallucinations,              

      Allergy/Immunology: Negative for hives, rash, and allergies, Endocrine: Negative for        

      neck swelling, polydipsia, polyuria, polyphagia, and marked weight changes,                 

      Hematologic/Lymphatic: Negative for swollen nodes, abnormal bleeding, and unusual           

      bruising.                                                                                   

                                                                                                  

Exam:                                                                                             

13:42 Constitutional:  This is a well developed, well nourished patient who is awake, alert,  mh7 

      and in no acute distress. Head/Face:  Normocephalic, atraumatic. Neck:  Trachea             

      midline, no thyromegaly or masses palpated, and no cervical lymphadenopathy.  Supple,       

      full range of motion without nuchal rigidity, or vertebral point tenderness.  No            

      Meningismus.                                                                                

13:42 Cardiovascular:  Regular rate and rhythm with a normal S1 and S2.  No gallops, murmurs,     

      or rubs.  Normal PMI, no JVD.  No pulse deficits. Respiratory:  Lungs have equal breath     

      sounds bilaterally, clear to auscultation and percussion.  No rales, rhonchi or wheezes     

      noted.  No increased work of breathing, no retractions or nasal flaring. Abdomen/GI:        

      Soft, non-tender, with normal bowel sounds.  No distension or tympany.  No guarding or      

      rebound.  No evidence of tenderness throughout. Back:  No spinal tenderness.  No            

      costovertebral tenderness.  Full range of motion. Skin:  Warm, dry with normal turgor.      

      Normal color with no rashes, no lesions, and no evidence of cellulitis. MS/ Extremity:      

      Pulses equal, no cyanosis.  Neurovascular intact.  Full, normal range of motion. Neuro:     

       Awake and alert, GCS 15, oriented to person, place, time, and situation.  Cranial          

      nerves II-XII grossly intact.  Motor strength 5/5 in all extremities.  Sensory grossly      

      intact.  Cerebellar exam normal.  Normal gait. Psych:  Awake, alert, with orientation       

      to person, place and time.  Behavior, mood, and affect are within normal limits.            

13:42 Chest/axilla: Inspection: normal, Palpation: tenderness, that is moderate, of the           

      mid-sternal area, that totally reproduces the patient's complaints.                         

16:49 ECG was reviewed by the Attending Physician.                                            [facility] 

                                                                                                  

Vital Signs:                                                                                      

12:25 Pulse 103; Resp 18; Temp 98.4; Pulse Ox 98% on R/A; Weight 172.37 kg; Height 4 ft. 11   ah  

      in. (149.86 cm); Pain 7/10;                                                                 

13:00  / 58; Pulse 100; Resp 12; Pulse Ox 98% ;                                         ah  

12:25 Body Mass Index 76.75 (172.37 kg, 149.86 cm)                                            ah  

                                                                                                  

MDM:                                                                                              

13:40 Patient medically screened.                                                             [facility] 

15:50 Differential diagnosis: acute myocardial infarction, coronary artery disease chest wall [facility] 

      pain, Cholelithiasis costochondritis, pneumonia, pneumothorax. HEART Score: History:        

      Slightly Suspicious (0), ECG: Normal (0), Age: < or = 45 years (0), Risk Factors: 1 or      

      2 risk factors (1), [Hypertension] Troponin: < or = 1 x Normal Limit (0), Total Score =     

      1. Data reviewed: vital signs, nurses notes, old medical records, lab test result(s),       

      cardiac enzymes, CBC, electrolytes, urinalysis, EKG, radiologic studies, plain films.       

      Data interpreted: Cardiac monitor: rate is 88 beats/min, rhythm is normal sinus rhythm,     

      regular, Interpretation: normal rate, normal rhythm, Pulse oximetry: on room air is 98      

      %. Interpretation: normal. Counseling: I had a detailed discussion with the patient         

      and/or guardian regarding: the historical points, exam findings, and any diagnostic         

      results supporting the discharge/admit diagnosis, lab results, radiology results, the       

      need for outpatient follow up, to return to the emergency department if symptoms worsen     

      or persist or if there are any questions or concerns that arise at home.                    

                                                                                                  

                                                                                             

13:41 Order name: Basic Metabolic Panel; Complete Time: 15:18                                 [facility] 

                                                                                             

13:41 Order name: CBC with Diff; Complete Time: 15:18                                         [facility] 

                                                                                             

13:41 Order name: LFT's; Complete Time: 15:18                                                 [facility] 

                                                                                             

13:41 Order name: Magnesium; Complete Time: 15:18                                             [facility] 

                                                                                             

13:41 Order name: NT PRO-BNP; Complete Time: 15:18                                                                                                                                         

13:41 Order name: PT-INR; Complete Time: 15:18                                                [facility] 

                                                                                             

13:41 Order name: Troponin (emerg Dept Use Only); Complete Time: 15:18                                                                                                                     

13:41 Order name: XRAY Chest (1 view); Complete Time: 15:18                                   [facility] 

                                                                                             

13:41 Order name: EKG; Complete Time: 13:42                                                                                                                                                

13:41 Order name: Cardiac monitoring; Complete Time: 14:02                                                                                                                                 

13:41 Order name: EKG - Nurse/Tech; Complete Time: 14:02                                                                                                                                   

13:41 Order name: IV Saline Lock; Complete Time: 14:01                                        [facility] 

                                                                                             

13:41 Order name: Labs collected and sent; Complete Time: 14:54                               [facility] 

                                                                                             

13:41 Order name: O2 Per Protocol; Complete Time: 14:01                                       [facility] 

                                                                                             

13:41 Order name: O2 Sat Monitoring; Complete Time: 14:01                                      

                                                                                                  

EC:49 Rate is 100 beats/min. Rhythm is regular. QRS Axis is Normal. [place] interval is normal.    7 

      QRS interval is normal. QT interval is normal. No Q waves. T waves are Normal. No ST        

      changes noted. Clinical impression: Normal ECG.                                             

                                                                                                  

Administered Medications:                                                                         

15:35 Drug: Dilaudid 1 mg Route: IVP; Site: right forearm;                                    ph  

16:35 Follow up: Response: No adverse reaction                                                ah  

                                                                                                  

                                                                                                  

Disposition:                                                                                      

20 15:53 Discharged to Home. Impression: Chest pain, unspecified.                           

- Condition is Stable.                                                                            

- Discharge Instructions: Nonspecific Chest Pain, Easy-to-Read.                                   

                                                                                                  

- Medication Reconciliation Form, Thank You Letter, Antibiotic Education, Prescription            

  Opioid Use form.                                                                                

- Follow up: Private Physician; When: 1 - 2 days; Reason: Worsening of condition,                 

  Recheck today's complaints, Re-evaluation by your physician.                                    

- Problem is an ongoing problem.                                                                  

- Symptoms have improved.                                                                         

                                                                                                  

                                                                                                  

                                                                                                  

Signatures:                                                                                       

Dispatcher [employer]                           Isabel Ortiz RN                      RN   Ban Parker RN RN ah Holmes, Maurice, MD MD   mh7                                                  

                                                                                                  

Corrections: (The following items were deleted from the chart)                                    

17:21 15:53 2020 15:53 Discharged to Home. Impression: Chest pain, unspecified.         ph  

      Condition is Stable. Forms are Medication Reconciliation Form, Thank You Letter,            

      Antibiotic Education, Prescription Opioid Use. Follow up: Private Physician; When: 1 -      

      2 days; Reason: Worsening of condition, Recheck today's complaints, Re-evaluation by        

      your physician. Problem is an ongoing problem. Symptoms have improved. mh7                  

                                                                                                  

**************************************************************************************************

## 2020-06-25 NOTE — XMS REPORT
Continuity of Care Document

                            Created on:2020



Patient:JORDAN VERDIN JR

Sex:Male

:1985

External Reference #:[id number]





Demographics







                          Address                   [address]



                                                    [place] [address]

 

                          Home Phone                [phone] FAC

 

                          Work Phone                [phone]

 

                          Mobile Phone              [phone]

 

                          Email Address             [phone]

 

                          Preferred Language        English

 

                          Marital Status            Unknown

 

                          Sikhism Affiliation     Unknown

 

                          Race                      Unknown

 

                          Additional Race(s)        Unavailable



                                                    Black or 



                                                    Unavailable

 

                          Ethnic Group              Unknown









Author







                          Organization              [facility]

t

 

                          Address                   [address]



                                                    [place] [address]

 

                          Phone                     [phone]









Support







                Name            Relationship    Address         Phone

 

                Chito          Mother          [address] [phone]

[phone]



                                                [place] [address] 









Care Team Providers







                    Name                Role                Phone

 

                    Sharpless           Primary Care Physician [phone]

 

                    Silvestre MEADOWS          Attending Clinician [phone]

 

                    Darby MEADOWS        Attending Clinician [phone]

 

                    Randall MEADOWS           Attending Clinician [phone]

 

                    SILVESTRE             Attending Clinician Unavailable

 

                    GAYLE Linares       Attending Clinician [phone]

 

                    Jamie Reinoso         Attending Clinician [phone]

 

                    RALPH TORRES         Attending Clinician Unavailable

 

                    RANDALL              Admitting Clinician Unavailable

 

                    Saw Ríos      Admitting Clinician [phone]

 

                    Jamie Reinoso         Admitting Clinician [phone]

 

                    RALPH TORRES         Admitting Clinician Unavailable









Payers







           Payer Name Policy     Policy Number Effective  Expiration Source



                      Type                  Date       Date       

 

           MEDICAID - MEDICAID [id number]            xxxxxxxxx                        [facility]

s -



           PLANxxxxxxxxxMedicaid                                             Med

ical



           Contracted                                             Center







Problems







       Condition Condition Condition Status Onset  Resolution Last   Treating Co

mments 

Source



       Name   Details Category        Date   Date   Treatment Clinician        



                                                 Date                 

 

       Hyperglyce Hyperglyce Disease Active                              C

[facility] 



       jarvis    jarvis                                                 [facility]



       without without               00:00:                             Medical



       ketosis ketosis               00                                 Center

 

       HEADACHE        Diagnosis Active 2018               M

[facility]



                                             22:05:00               l



                HEADACHE               00:00:                             [facility]

n



                                   00                                 



                                                                      



               Active                                                  



                                                                      



                                                                      



              2018                                                  



                                                                      



                                                                      



                                                                      



                                                                      



                                                                      



                                                                      



                                                                      



                                                                      



                     [facility]                                                  



                                                                      



                                                                      

 

       SHUNT         Diagnosis Active 2018               Mem

oria



       MALFUNCTIO                                20:00:00               l



       N        SHUNT               00:00:                             [place]



              MALFUNCTIO               00                                 



              N                                                       



                                                                      



                 Active                                                  



                                                                      



                                                                      



              2018                                                  



                                                                      



                                                                      



                                                                      



                                                                      



                                                                      



                                                                      



                                                                      



                                                                      



                     [facility]                                                  



                                                                      



                                                                      

 

       ACUTE         Diagnosis Active 2018               Mem

oria



       HEADACHE                                09:20:00               l



                ACUTE               00:00:                             [place]



              HEADACHE               00                                 



                                                                      



                                                                      



              Active                                                  



                                                                      



                                                                      



              2018                                                  



                                                                      



                                                                      



                                                                      



                                                                      



                                                                      



                                                                      



                                                                      



                                                                      



                     [facility]                                                  



                                                                      



                                                                      

 

       Spina  Spina  Disease Active                              [facility]



       bifida bifida                                              [facility]



                                   00:00:                             Medical



                                   00                                 [place]

 

       Pyelonephr Pyelonephr Disease Active                              [facility]

[facility]



       itis   itis                                                [facility]



                                   00:00:                             Medical



                                   00                                 [place]

 

       Nicotine Nicotine Problem Active                                    [facility]

t



       dependence dependence                                                  [facility]

[facility]



                                                                      [facility]



                                                                      [facility]

 

       Lumbar Lumbar Problem Active                                    [facility]



       spina  spina                                                   [facility]



       bifida bifida                                                  [facility]



       with   with                                                    l



       hydrocepha hydrocepha                                                  Ou

tpati



       ozzy    ozzy                                                     ent



                                                                      Clinics

 

       Dependence Dependence Problem Active                                    [facility]

[facility]



       on     on                                                      [facility]



       wheelchair wheelchair                                                  Me

moria



                                                                      l



                                                                      [facility]

 

       Fecal  Fecal  Problem Active                                    [facility]



       incontinen incontinen                                                  [facility]

[facility]



       ce     ce                                                      [facility]



                                                                      [facility]



                                                                      [facility]

 

       Foot ulcer Foot ulcer Problem Active                                    [facility]

[facility]



                                                                      [facility]



                                                                      [facility]



                                                                      [facility]



                                                                      [facility]

 

       Depression Depression Problem Active                                    [facility]

[facility]



       with   with                                                    [facility]



       anxiety anxiety                                                  [facility]



                                                                      [facility]



                                                                      [facility]

 

       Paraplegia Paraplegia Problem Active                                    [facility]

[facility]



                                                                      [facility]



                                                                      [facility]



                                                                      [facility]



                                                                      [facility]

 

       Constipati Constipati Problem Active                                    C

[facility]



       on     on                                                      [facility]



                                                                      [facility]



                                                                      [facility]



                                                                      [facility]

 

       Incontinen Incontinen Problem Active                                    [facility]

[facility]



       ce of  ce of                                                   [facility]



       urine  urine                                                   [facility]



                                                                      l



                                                                      [facility]

 

       Nausea Nausea Problem Active                                    [facility]



       alone  alone                                                   [facility]



                                                                      [facility]



                                                                      [facility]



                                                                      [facility]

 

       Episodic Episodic Problem Active                                    [facility]

t



       tension-ty tension-ty                                                  [facility]

[facility] -



       pe     pe                                                      [facility]



       headache, headache,                                                  l



       not    not                                                     [facility]



       intractabl intractabl                                                  en

t



       e      e                                                       Clinics

 

                 Problem Active                                    [facility]



       (ventricul (ventricul                                                  [facility]

[facility] -



       operitonea operitonea                                                  Me

moria



       l) shunt l) shunt                                                  l



       status status                                                  [facility]



                                                                      [facility]



                                                                      Clinics

 

       Nonintract Nonintract Problem Active                                    [facility]

[facility]



       able   able                                                    [facility]



       episodic episodic                                                  [facility]

a



       headache, headache,                                                  l



       unspecifie unspecifie                                                  Ou

tpati



       d headache d headache                                                  en

t



       type   type                                                    Clinics

 

       Mental Mental Problem Active                                    [facility]



       disorder, disorder,                                                  [facility]

[facility] -



       not    not                                                     [facility]



       otherwise otherwise                                                  l



       specified specified                                                  Outp

ati



                                                                      ent



                                                                      Clinics

 

       Insomnia Insomnia Problem Active                                    [facility]

t



       due to due to                                                  [facility]



       other  other                                                   [facility]



       mental mental                                                  l



       disorder disorder                                                  Outpat

i



                                                                      ent



                                                                      Clinics

 

       Ulcer of Ulcer of Problem Active                                    [facility]

t



       left foot, left foot,                                                  [facility]

[facility] -



       unspecifie unspecifie                                                  Me

moria



       d ulcer d ulcer                                                  l



       stage  stage                                                   [facility]



                                                                      ent



                                                                      Clinics

 

       Bipolar Bipolar Problem Active                                    [facility]



       depression depression                                                  [facility]

[facility]



                                                                      [facility]



                                                                      l



                                                                      [facility]



                                                                      ent



                                                                      Clinics

 

       Nausea and Nausea and Problem Active                                    [facility]

[facility]



       vomiting, vomiting,                                                  [facility]

s -



       intractabi intractabi                                                  Me

moria



       lity of lity of                                                  l



       vomiting vomiting                                                  Outpat

i



       not    not                                                     ent



       specified, specified,                                                  Cl

inics



       unspecifie unspecifie                                                  



       d vomiting d vomiting                                                  



       type   type                                                    

 

       Blood  Blood  Problem Active                                    [facility]



       tests for tests for                                                  [facility]

[facility] -



       routine routine                                                  [facility]



       general general                                                  l



       physical physical                                                  Outpat

i



       examinatio examinatio                                                  en

t



       n      n                                                       Clinics

 

       Spina         Problem                      2019               [facility]

ia



       bifida,                                    14:14:02               l



       unspecifie   Spina                                                  [facility]

nn



       d      bifida,                                                  



              unspecifie                                                  



              d                                                       



                                                                      



                                                                      



                                                                      



                                                                      



                                                                      



                                                                      



                                                                      



                                                                      



              2019                                                  



                                                                      



                                                                      



                                                                      



                                                                      



                 [facility]                                                  



                                                                      



                                                                      

 

       Nausea        Problem                      2019               Mem[facility]

ia



       with                                      14:14:02               l



       vomiting,   Nausea                                                  [place]

nn



       unspecifie with                                                    



       d      vomiting,                                                  



              unspecifie                                                  



              d                                                       



                                                                      



                                                                      



                                                                      



                                                                      



                                                                      



                                                                      



                                                                      



                                                                      



              2019                                                  



                                                                      



                                                                      



                                                                      



                                                                      



                 [facility]                                                  



                                                                      



                                                                      

 

       Diplopia        Problem                      2019               Mem

oria



                                                 14:14:02               l



                Diplopia                                                  [facility]

n



                                                                      



                                                                      



                                                                      



                                                                      



                                                                      



                                                                      



                                                                      



                                                                      



                                                                      



              2019                                                  



                                                                      



                                                                      



                                                                      



                                                                      



                 [facility]                                                  



                                                                      



                                                                      

 

       Cerebral        Problem                      2019               [facility]

[facility]



       palsy,                                    14:14:02               l



       unspecifie   Cerebral                                                  He

rmann



       d      palsy,                                                  



              unspecifie                                                  



              d                                                       



                                                                      



                                                                      



                                                                      



                                                                      



                                                                      



                                                                      



                                                                      



                                                                      



              2019                                                  



                                                                      



                                                                      



                                                                      



                                                                      



                 [facility]                                                  



                                                                      



                                                                      

 

       Acquired        Problem                      2019               [facility]

[facility]



       absence of                                    14:14:02               l



       other    Acquired                                                  Miguel

n



       specified absence of                                                  



       parts of other                                                   



       digestive specified                                                  



       tract  parts of                                                  



              digestive                                                  



              tract                                                   



                                                                      



                                                                      



                                                                      



                                                                      



                                                                      



                                                                      



                                                                      



                                                                      



                                                                      



              2019                                                  



                                                                      



                                                                      



                                                                      



                                                                      



                 [facility]                                                  



                                                                      



                                                                      

 

       Nicotine        Problem                      2019               [facility]

[facility]



       dependence                                    14:14:02               l



       ,        Nicotine                                                  Miguel

n



       cigarettes dependence                                                  



       ,      ,                                                       



       uncomplica cigarettes                                                  



       huma    ,                                                       



              uncomplica                                                  



              huma                                                     



                                                                      



                                                                      



                                                                      



                                                                      



                                                                      



                                                                      



                                                                      



                                                                      



              2019                                                  



                                                                      



                                                                      



                                                                      



                                                                      



                 [facility]                                                  



                                                                      



                                                                      

 

       Presence        Problem                      2019               [facility]

[facility]



       of                                        14:14:02               l



       cerebrospi   Presence                                                  He

rmann



       nal fluid of                                                      



       drainage cerebrospi                                                  



       device nal fluid                                                  



              drainage                                                  



              device                                                  



                                                                      



                                                                      



                                                                      



                                                                      



                                                                      



                                                                      



                                                                      



                                                                      



                                                                      



              2019                                                  



                                                                      



                                                                      



                                                                      



                                                                      



                 [facility]                                                  



                                                                      



                                                                      

 

       Allergy        Problem                      2019               [facility]

rajeev



       status to                                    14:14:02               l



       other    [facility]                                                  Jose



       antibiotic status to                                                  



       agents other                                                   



       status antibiotic                                                  



              agents                                                  



              status                                                  



                                                                      



                                                                      



                                                                      



                                                                      



                                                                      



                                                                      



                                                                      



                                                                      



                                                                      



              2019                                                  



                                                                      



                                                                      



                                                                      



                                                                      



                 [facility]                                                  



                                                                      



                                                                      

 

       Allergy        Problem                      2019               [facility]

rajeev



       status to                                    14:14:02               l



       other    [facility]                                                  Jose



       drugs, status to                                                  



       medicament other                                                   



       s and  drugs,                                                  



       biological medicament                                                  



       substances s and                                                   



       status biological                                                  



              substances                                                  



              status                                                  



                                                                      



                                                                      



                                                                      



                                                                      



                                                                      



                                                                      



                                                                      



                                                                      



                                                                      



              2019                                                  



                                                                      



                                                                      



                                                                      



                                                                      



                 [facility]                                                  



                                                                      



                                                                      

 

       Allergy        Problem                      2019               [facility]

rajeev



       status to                                    14:14:02               l



       narcotic   [facility]ergy                                                  [place]

nn



       agent  status to                                                  



       status narcotic                                                  



              agent                                                   



              status                                                  



                                                                      



                                                                      



                                                                      



                                                                      



                                                                      



                                                                      



                                                                      



                                                                      



                                                                      



              2019                                                  



                                                                      



                                                                      



                                                                      



                                                                      



                 [facility]                                                  



                                                                      



                                                                      

 

       Asthma        Problem Resolve               2019               [facility]

rajeev



       (disorder)               d                    01:05:55               l



                Asthma                                                  [place]



              (disorder)                                                  



                                                                      



                                                                      



               Resolved                                                  



                                                                      



                                                                      



                                                                      



                                                                      



                Problem                                                  



                                                                      



                                                                      



              2019                                                  



                                                                      



                                                                      



                                                                      



                                                                      



                 [facility]                                                  



                                                                      



                                                                      

 

       Bronchitis        Problem Resolve               2019               

[facility]



       (disorder)               d                    01:05:55               l



                                                                      Jose



              Bronchitis                                                  



              (disorder)                                                  



                                                                      



                                                                      



               Resolved                                                  



                                                                      



                                                                      



                                                                      



                                                                      



                Problem                                                  



                                                                      



                                                                      



              2019                                                  



                                                                      



                                                                      



                                                                      



                                                                      



                 [facility]                                                  



                                                                      



                                                                      

 

       Cerebral        Problem Resolve               2019               Me

moria



       palsy                d                    01:05:55               l



       (disorder)   Cerebral                                                  He

rmann



              palsy                                                   



              (disorder)                                                  



                                                                      



                                                                      



               Resolved                                                  



                                                                      



                                                                      



                                                                      



                                                                      



                Problem                                                  



                                                                      



                                                                      



              2019                                                  



                                                                      



                                                                      



                                                                      



                                                                      



                 [facility]                                                  



                                                                      



                                                                      

 

       Hydrocepha        Problem Resolve               2019               

[facility]



       ozzy                  d                    01:05:55               l



       (disorder)                                                         Miguel

n



              Hydrocepha                                                  



              ozzy                                                     



              (disorder)                                                  



                                                                      



                                                                      



               Resolved                                                  



                                                                      



                                                                      



                                                                      



                                                                      



                Problem                                                  



                                                                      



                                                                      



              2019                                                  



                                                                      



                                                                      



                                                                      



                                                                      



                 [facility]                                                  



                                                                      



                                                                      

 

       Osteomyeli        Problem Resolve               2019               

[facility]



       tis                  d                    01:05:55               l



       (disorder)                                                         Miguel

n



              Osteomyeli                                                  



              tis                                                     



              (disorder)                                                  



                                                                      



                                                                      



               Resolved                                                  



                                                                      



                                                                      



                                                                      



                                                                      



                Problem                                                  



                                                                      



                                                                      



              2019                                                  



                                                                      



                                                                      



                                                                      



                                                                      



                 [facility]                                                  



                                                                      



                                                                      

 

       Acute pain        Problem Active               2019               M

[facility]



       (finding)                                    01:05:55               l



                Acute                                                  [place]



              pain                                                    



              (finding)                                                  



                                                                      



                                                                      



              Active                                                  



                                                                      



                                                                      



                                                                      



                                                                      



              Problem                                                  



                                                                      



                                                                      



              2019                                                  



                                                                      



                                                                      



                                                                      



                                                                      



                 [facility]                                                  



                                                                      



                                                                      

 

       Headache        Problem Active               2019               [facility]

oria



       (finding)                                    01:05:55               l



                Headache                                                  [facility]

n



              (finding)                                                  



                                                                      



                                                                      



              Active                                                  



                                                                      



                                                                      



                                                                      



                                                                      



              Problem                                                  



                                                                      



                                                                      



              2019                                                  



                                                                      



                                                                      



                                                                      



                                                                      



                 [facility]                                                  



                                                                      



                                                                      

 

       Headache        Problem        -2019              

 [facility]



                                   -   14:14:02 14:14:02               l



                Headache               06:00:                             Miguel

n



                                   00                                 



                                                                      



                                                                      



                                                                      



                                                                      



              2018                                                  



                                                                      



                                                                      



                                                                      



                                                                      



                 [facility]                                                  



                                                                      



                                                                      







Allergies, Adverse Reactions, Alerts







       Allergy Allergy Status Severity Reaction(s) Onset  Inactive Treating Comm

ents 

Source



       Name   Type                        Date   Date   Clinician        

 

       Sulfamet Propensi Active        Hives                        [facility]



       hoxazole ty to                                               [facility]



       -[facility] adverse                      00:00:                      Medical



       oprim  reaction                      00                          Center



              s                                                       

 

       Levoflox Propensi Active        Hives                        [facility]



       acin   ty to                                               [facility]



              adverse                      00:00:                      Medical



              reaction                      00                          Center



              s                                                       

 

       Morphine Propensi Active        Hives                        [facility]



              ty to                                               [facility]



              adverse                      00:00:                      Medical



              reaction                      00                          Center



              s                                                       

 

       Sesame Propensi Active        Hives                        [facility]



       Seed   ty to                                               [facility]



              adverse                      00:00:                      Medical



              reaction                      00                          Center



              s                                                       

 

       Ketorola Propensi Active        Rash                         [facility]



       c      ty to                                               [facility]



              adverse                      00:00:                      Medical



              reaction                      00                          Center



              s                                                       

 

       Vancomyc Propensi Active        Rash                         [facility]



       in     ty to                                               [facility]



       Analogue adverse                      00:00:                      Medical



       s      reaction                      00                          Center



              s                                                       

 

       Ondanset Propensi Active        Nausea And                       [facility]



       jersey Hcl ty to                Vomiting                         [facility]



       (Pf)   adverse                      00:00:                      Medical



              reaction                      00                          Center



              s                                                       

 

       Morphine Adverse Active        Info Not                             [facility]

t



       Sulfate Reaction               Available                             [facility]

s -



       ER                                                             [facility]



                                                                      l



                                                                      Outpati



                                                                      ent



                                                                      Clinics

 

       Levaquin Adverse Active        Info Not                             [facility]

t



              Reaction               Available                             [facility]

 -



                                                                      [facility]



                                                                      l



                                                                      Outpati



                                                                      ent



                                                                      Clinics

 

       Zofran Adverse Active        Info Not                             [facility]



              Reaction               Available                             [facility]

 -



                                                                      [facility]



                                                                      l



                                                                      Outpati



                                                                      ent



                                                                      Clinics

 

       amoxicil amoxicil Active                                           [facility]

a



       bryn    bryn                                                     l



                                                                      [facility]

 

       morphine morphine Active                                           [facility]

a



                                                                      l



                                                                      [facility]

 

       Toradol Toradol Active                                           [facility]



                                                                      l



                                                                      [place]

 

       Minocin Minocin Active                                           [facility]



                                                                      l



                                                                      [facility]

 

       Zofran Zofran Active                                           [facility]



                                                                      l



                                                                      [facility]

 

       Levaquin Levaquin Active                                           [facility]

a



                                                                      l



                                                                      [facility]

 

       Bactrim Bactrim Active                                           [facility]



                                                                      l



                                                                      [place]







Social History







           Social Habit Start Date Stop Date  Quantity   Comments   Source

 

           History of tobacco                       Cigarette Smoker            

[facility] -



           use                                                    [facility]

 

           Sex Assigned At                                             [facility]

 

           Cigarettes smoked 2017                       [facility] -



           current (pack per 00:00:00   00:00:00                         Medical

 Center



           day) - Reported                                             









                Smoking Status  Start Date      Stop Date       Source

 

                Social History  2018 11:26:10                 [facility]

[facility]







Medications







       Ordered Filled Start  Stop   Current Ordering Indication Dosage Frequency

 Signature

                    Comments            Components          Source



     Medication Medication Date Date Medication? Clinician                (SIG) 

          



     Name Name                                                   

 

     buPROPion      2021- No             150mg QD   Take 1           [facility]



     (WELLBUTRIN      1-17 -16                          tablet           [facility]

 -



     XL) 150 MG      00:00: 23:59                          (150 mg           Med

ical



     24 hr      00   :00                           total) by           Center



     tablet                                         mouth           



                                                  daily.           

 

     citalopram      2021- No             40mg QD   Take 1           [facility]



     (CELEXA) 40      -17 16                          tablet (40           L

ukes -



     MG tablet      00:00: 23:59                          mg total)           Me

dical



               00   :00                           by mouth           Center



                                                  daily.           

 

     zinc            Yes            5g   Q.5D Apply 5 g           [facility]



     oxide-yuan      1-16                               topically           [facility]

es -



     latum      00:00:                               2 (two)           Medical



     (CRITIC-AID      00                                 times           Center



     ) 20-51 %                                         daily.           



     Pste                                                        



     topical                                                        



     paste                                                        

 

     meropenem            Yes            1g        Inject 1 g           [facility]



     (MERREM)      -16                               intravenou           [facility]

 -



     MBP 1 gm in      00:00:                               sly every           M

edical



     100 mL NS      00                                 8 (eight)           [facility]

r



                                                  hours.           

 

     insulin      -      Yes            58U  Q.5D Inject 58           [facility]

t



     glargine      1-16                               Units           [facility]



     (LANTUS)      00:00:                               subcutaneo           Med

ical



     100 unit/mL      00                                 usly 2           Center



     injection                                         (two)           



                                                  times           



                                                  daily Use           



                                                  as             



                                                  directed.           

 

     insulin      2021- No             0U        Inject           [facility]



     lispro      1-16 01-15                          0-12 Units           [facility] 

-



     (HUMALOG)      00:00: 23:59                          subcutaneo           M

edical



     100 unit/mL      00   :00                           usly 3           Center



     injection                                         (three)           



                                                  times           



                                                  daily           



                                                  before           



                                                  meals.           

 

     insulin      2021- No             25U       Inject 25           [facility]



     lispro      1-16 01-15                          Units           [facility]



     (HUMALOG)      00:00: 23:59                          subcutaneo           M

edical



     100 unit/mL      00   :00                           usly 3           Center



     injection                                         (three)           



                                                  times           



                                                  daily           



                                                  before           



                                                  meals.           

 

     traZODone      2020- No             100mg QD   Take 1           [facility]



     (DESYREL)      1-16 02-15                          tablet           [facility]



     100 MG      00:00: 23:59                          (100 mg           Medical



     tablet      00   :00                           total) by           Center



                                                  mouth           



                                                  nightly           



                                                  for 30           



                                                  days.           

 

     normal      2018      No                       1,000 mL,           [facility]

a



     saline 0.9%                                     Rate: 100           l



     IV 1,000 mL      11:04:                               ml/hr,           [facility]

jorge



                                                Infuse           



                                                  over: 10           



                                                  hr, Route:           



                                                  IV, Dosing           



                                                  Weight           



                                                  131.818           



                                                  kg, Total           



                                                  Volume:           



                                                  1,000,           



                                                  Start           



                                                  date:           



                                                  18           



                                                  5:04:00           



                                                  CST,           



                                                  Duration:           



                                                  30 day,           



                                                  Stop date:           



                                                  18           



                                                  5:03:00           



                                                  CST, 2.4,           



                                                  m2             

 

     Magnesium      2018      No                       Notes:           [facility]

a



     Sulfate                                     WASTE: F/P           l



               10:36:                               - Sink; E           Jose



                                                -              



                                                  Municipal           



                                                  Trash Bin           

 

     Isolyte S      2018      No                       Notes:           [facility]

a



     PH-7.4                                     (Same as:           l



     (Bolus) IV      10:36:                               Isolyte S           

rm                                 PH 7.4)           

 

     Phenergan      2018      No                       12.5 mg,           [facility]

rajeev



                                              0.5 mL,           l



               10:35:                               Route:                                            IVPB, Drug           



                                                  form: INJ,           



                                                  ONCE,           



                                                  Dosing           



                                                  Weight           



                                                  131.818,           



                                                  kg,            



                                                  Priority:           



                                                  STAT,           



                                                  Start           



                                                  date:           



                                                  18           



                                                  4:35:00           



                                                  CST, Stop           



                                                  date:           



                                                  18           



                                                  4:35:00           



                                                  CST            

 

     Citalopram Citalopram       Yes  [place]                1 tablet     

      [facility]



     Hydrobromid Hydrobromid 7-16                                              L

ukes -



     e    e    00:00:                                              [facility]



               00                                                l



                                                                 Outpati



                                                                 ent



                                                                 [facility]

 

     Seroquel Seroquel       Yes  [place]                1 tablet         

  [facility]



               7-16                                              [facility]



               00:00:                                              [facility]



               00                                                l



                                                                 [facility]



                                                                 ent



                                                                 [facility]

 

     Docusate            Yes                      100 mg = 1           Mem

oria



     Sodium 100      5-08                               cap, PO,           l



     MG Oral      14:56:                               BID, 0           [facility]



     Capsule      00                                 Refill(s)           

 

     Zosyn            Yes                      0              [facility]



               5-08                               Refill(s)           l



               14:56:                                              [place]



               00                                                

 

     celecoxib            Yes                      200 mg = 1           Me

moria



     200 mg oral      5-08                               cap, PO,           l



     capsule      14:56:                               BID, 0           Jose                                 Refill(s)           

 

     ascorbic            Yes                      500 mg = 1           Mem

oria



     acid      5-08                               tab, PO,           l



               14:56:                               BID, 0           [facility]



                                                Refill(s)           

 

     acetaminoph            Yes                      1,000 mg =           

[facility]



     en 500 mg      5-08                               2 tab, PO,           l



     oral tablet      14:56:                               Q6Hnow, 0           H

ermann



                                                Refill(s)           

 

     Oxycodone            Yes                      5 mg = 1           [facility]

rajeev



     Hydrochlori      5-08                               tab, PO,           l



     de 5 MG      14:56:                               Q4H, PRN           [facility]

n



     Oral Tablet      00                                 Pain Score           



                                                  4-6, 0           



                                                  Refill(s)           

 

     zinc            Yes                      220 mg = 1           [facility]



     sulfate 220      5-08                               cap, PO,           l



     mg oral      14:56:                               Daily, 0           [facility]

n



     capsule      00                                 Refill(s)           

 

     multivitami            Yes                      1 tab, PO,           

[facility]



     n         5-08                               Daily, 0           l



               14:56:                               Refill(s)           Jose



                                                               

 

     methocarbam            Yes                      1,000 mg =           

[facility]



     ol 500 mg      5-08                               2 tab, PO,           l



     oral tablet      14:56:                               Q8H, 0           [facility]

jorge



                                                Refill(s)           

 

     LORazepam            Yes                      0.5 mg = 1           Me

moria



     0.5 mg oral      5-08                               tab, PO,           l



     tablet      14:56:                               Q8H, PRN           [place]



                                                Anxiety, 0           



                                                  Refill(s)           

 

     Lidocaine            Yes                      3 patch,           [facility]

rajeev



     Hydrochlori      5-08                               TOP,           l



     de 0.05      14:56:                               Daily,           [place]



     MG/MG      00                                 Remove           



     Transdermal                                         after 12           



     Patch                                         hours, 0           



     [Lidoderm]                                         Refill(s)           

 

     Robaxin            No                       Notes:           [facility]



               5-06                               (Same           l



               21:00:                               as:Robaxin           [facility]



                                                )              

 

     Oxycodone            No                       Notes:           [facility]

a



     Hydrochlori      5-06                               (Same as:           l



     de 5 MG      18:06:                               Roxicodone           [facility]

jorge



     Oral Tablet                                       )              

 

     Ativan            No                       Notes:           [facility]



               5-06                               (Same as:           l



               15:18:                               Ativan)           [place]



                                                               

 

     Trazodone            No                       Notes:           [facility]

a



     Hydrochlori      5-06                               (Same As:           l



     de 50 MG      02:00:                               Desyrel)           [facility]

nn



     Oral Tablet      00                                                

 

     remove            No                       Notes:           [facility]



     patch      5-06                               Remove           l



               02:00:                               patch 12           [place]



               00                                 hours           



                                                  after           



                                                  applicatio           



                                                  n each           



                                                  day.           

 

     Oxycodone            No                       Notes:           [facility]

a



     Hydrochlori      5-05                               (Same as:           l



     de 5 MG      22:01:                               Roxicodone           [facility]

jorge



     Oral Tablet      00                                 )              

 

     Celebrex            No                       Notes:           [facility]



               5-05                               NSAID.           l



               22:00:                               Please           [place]



               00                                 check           



                                                  indication           



                                                  . Not for           



                                                  seizure.           



                                                  (Same As:           



                                                  CeleBREX)           

 

     Vancomycin            No                        2001 mg:           Me

[facility]



               5-05                               infuse           l



               21:00:                               over 2.5           [facility]



               00                                 hours           

 

     Lidocaine            No                       Notes:           [facility]

a



     Hydrochlori      5-05                               Apply only           l



     de 0.05      14:00:                               once for           [facility]

n



     MG/MG      00                                 up to 12           



     Transdermal                                         hours in a           



     Patch                                         24-hour           



     [Lidoderm]                                         period (12           



                                                  hours on           



                                                  and 12           



                                                  hours           



                                                  off).           



                                                  (Same as:           



                                                  Lidoderm)           



                                                  "Remove           



                                                  old patch           



                                                  before           



                                                  applicatio           



                                                  n of new           



                                                  patch"           

 

     Phenergan            No                       Notes: Do           [facility]

[facility]



               5-04                               not give           l



               22:40:                               IV push.           [facility]



               00                                 (Same as:           



                                                  Phenergan)           

 

     Dilaudid            No                       Notes:           [facility]



               5-04                               Same as           l



               22:40:                               Dilaudid           [place]



               00                                                

 

     Tramadol            No                       Notes: Not           [facility]

[facility]



               5-04                               to exceed           l



               22:00:                               400mg/day.           [place]



               00                                 (Same As:           



                                                  Ultram)           

 

     gabapentin            No                       Notes:           [facility]

ia



               5-04                               (Same as:           l



               22:00:                               Neurontin)           [place]



               00                                                

 

     Acetaminoph            No                       Notes: Max           

[facility]



     en        -04                               acetaminop           l



               22:00:                               hen 4000           [facility]



               00                                 mg/day (4           



                                                  gm/day).           



                                                  (Same as:           



                                                  Tylenol           



                                                  Extra           



                                                  Strength)           

 

     Robaxin            No                       Notes:           [facility]



               5-04                               (Same           l



               22:00:                               as:Robaxin           [place]



               00                                 )              

 

     Oxycodone            No                       Notes:           [facility]

a



     Hydrochlori      5-04                               (Same as:           l



     de 5 MG      21:33:                               Roxicodone           [facility]

jorge



     Oral Tablet      00                                 )              

 

     Beneprotein            No                       Notes:           [facility]

rajeev



     7 gm pkt      5-04                               (Same as:           l



               21:30:                               Beneprotei           [place]



               00                                 n)             

 

     Acetaminoph      2018-0      No                       1 tab, PO,           

[facility]



     en 325 MG /      5-04                               TID, 0           l



     Oxycodone      17:12:                               Refill(s)           [facility]

mateusz



     Hydrochlori      00                                                



     de 10 MG                                                        



     Oral Tablet                                                        



     [Percocet                                                        



     10325]                                                        

 

     Dilaudid            No                       0.5 mg,           [facility]

a



               5-04                               0.25 mL,           l



               16:01:                               Route:           [facility]



               00                                 IVP, Drug           



                                                  form: INJ,           



                                                  ONCE,           



                                                  Start           



                                                  date:           



                                                  18           



                                                  11:01:00           



                                                  CDT, Stop           



                                                  date:           



                                                  18           



                                                  11:01:00           



                                                  CDT            

 

     Phenergan            No                       Notes:           [facility]

a



               5-04                               (Same as:           l



               15:43:                               Phenergan)                                                           

 

     Dilaudid            No                       2 mg,           [facility]



               5-04                               Route:           l



               15:43:                               IVP, ONCE,                                            Dosing           



                                                  Weight           



                                                  127.027,           



                                                  kg,            



                                                  Priority:           



                                                  STAT,           



                                                  Start           



                                                  date:           



                                                  18           



                                                  10:43:00           



                                                  CDT, Stop           



                                                  date:           



                                                  18           



                                                  10:43:00           



                                                  CDT            

 

     Docusate            No                       Notes:           [facility]



               5-04                               (Same as:           l



               14:00:                               Colace)                                            (Do Not           



                                                  Crush)           

 

     Zinc            No                       Notes:           [facility]



     Sulfate      -04                               (Zinc           l



               14:00:                               sulfate                                            capsule) -           



                                                  220 mg           



                                                  Zinc           



                                                  sulfate =           



                                                  50 mg           



                                                  elemental           



                                                  zinc  Same           



                                                  as Zinc           



                                                  Sulfate           

 

     ascorbic            No                       Notes:           [facility]



     acid      5-04                               (Same as:           l



               14:00:                               Vitamin C)                                                           

 

     multivitami            No                       Notes:           [facility]

raje[facility]



     n         -04                               (Same           l



               14:00:                               as:Thera)                                            WASTE: F/P           



                                                  - Black; E           



                                                  -              



                                                  Municipal           



                                                  Trash Bin           



                                                  Take with           



                                                  food.           

 

     Naproxen            No                       Notes:           [facility]



               5-04                               (Same as:           l



               07:00:                               Naprosyn)                                            Take with           



                                                  food.           

 

     Zosyn            No                       Notes:           [facility]



               5-04                               (Same as:           l



               07:00:                               Zosyn)                                            Dosing           



                                                  based on           



                                                  Piperacill           



                                                  in             



                                                  component           



                                                   ***           



                                                  MEDICATION           



                                                  WASTE ***           



                                                  Product           



                                                  Size:           



                                                  3375 mg           



                                                  Product           



                                                  Wasted:           



                                                  ___ mg           

 

     Vancomycin            No                        2001 mg:           [facility]

[facility]



               5-04                               infuse           l



               07:00:                               over 2.5           [place]



               00                                 hours  For           



                                                  adult           



                                                  patients           



                                                  only:           



                                                  Round to           



                                                  nearest           



                                                  250 mg per           



                                                  Medical           



                                                  Staff           



                                                  approval           



                                                  ***            



                                                  MEDICATION           



                                                  WASTE ***           



                                                  Product           



                                                  Size:           



                                                  1000 mg           



                                                  Product           



                                                  Wasted:           



                                                  ___ mg           

 

     Enoxaparin            No                       Notes:           [facility]

ia



               -04                               (Same as:           l



               07:00:                               Lovenox)           [facility]



               00                                                

 

     Sodium            No                       1,000 mL,           [facility]

a



     Chloride                                     Rate: 125           l



     0.9% IV      06:46:                               ml/hr,           [facility]



     1,000 mL      00                                 Infuse           



                                                  over: 8           



                                                  hr, Route:           



                                                  IV, Dosing           



                                                  Weight           



                                                  127.27 kg,           



                                                  Total           



                                                  Volume:           



                                                  1,000,           



                                                  Start           



                                                  date:           



                                                  18           



                                                  1:46:00           



                                                  CDT,           



                                                  Duration:           



                                                  30 day,           



                                                  Stop date:           



                                                  18           



                                                  1:45:00           



                                                  CDT, 2.44,           



                                                  m2             

 

     Saline            No                       Notes:           [facility]



     Flush 0.9%                                     (Same as:           l



               06:46:                               BD             [facility]



                                                Posiflush)           

 

     Acetaminoph            No                       Notes: Do           [facility]

[facility]



     en        -04                               not exceed           l



               06:46:                               4 gm/day.           [place]



                                                (Same as:           



                                                  Tylenol)           

 

     Acetaminoph            No                       Notes:           [facility]

ra[facility]



     en 325 MG /      -                               (Same as:           l



     Hydrocodone      06:46:                               Norco           [facility]

nn



     Bitartrate      00                                 325/5)  Do           



     5 MG Oral                                         not exceed           



     Tablet                                         4gm/day of           



                                                  acetaminop           



                                                  hen.           

 

     Reglan            No                       Notes:           [facility]



               5-04                               (Same as:           l



               04:27:                               Reglan)           [facility]



               00                                                

 

     Benadryl            No                       Notes:           [facility]



               5-04                               (Same as:           l



               04:27:                               Benadryl)           [place]



                                                               

 

     Magnesium            No                       Notes:           [facility]

a



     Sulfate                                     WASTE: F/P           l



               04:26:                               - Sink; E           [place]



               00                                 -              



                                                  Municipal           



                                                  Trash Bin           

 

     Sodium            No                       1,000 mL,           [facility]

a



     Chloride      5-04                               1000           l



     0.9%      04:26:                               ml/hr,           [place]



     (Bolus) IV      00                                 Infuse           



                                                  Over: 1           



                                                  hr, Route:           



                                                  IV, 1,000,           



                                                  Drug form:           



                                                  INJ, ONCE,           



                                                  Priority:           



                                                  STAT,           



                                                  Dosing           



                                                  Weight           



                                                  127.273           



                                                  kg, Start           



                                                  date:           



                                                  18           



                                                  23:26:00           



                                                  CDT, Stop           



                                                  date:           



                                                  18           



                                                  23:26:00           



                                                  CDT            

 

     Zosyn            No                       4.5 gm,           [facility]



                                              Route:           l



               04:10:                               IVPB,           [place]



                                                ONCE,           



                                                  Dosing           



                                                  Weight           



                                                  127.273,           



                                                  kg,            



                                                  Priority:           



                                                  STAT,           



                                                  Start           



                                                  date:           



                                                  18           



                                                  23:10:00           



                                                  CDT, Stop           



                                                  date:           



                                                  18           



                                                  23:10:00           



                                                  CDT, ABX           



                                                  Indication           



                                                  :              



                                                  Bacteremia           

 

     Vancomycin            No                         mg:           Me

moria



                                              infuse           l



               04:10:                               over 2.5           [place]



                                                hours  For           



                                                  adult           



                                                  patients           



                                                  only:           



                                                  Round to           



                                                  nearest           



                                                  250 mg per           



                                                  Medical           



                                                  Staff           



                                                  approval           



                                                  ***            



                                                  MEDICATION           



                                                  WASTE ***           



                                                  Product           



                                                  Size:           



                                                  1000 mg           



                                                  Product           



                                                  Wasted:           



                                                  ___ mg           

 

     normal            No                       1,000 mL,           [facility]

a



     saline 0.9%                                     Rate: 75           l



     IV 1,000 mL      03:39:                               ml/hr,                                            Infuse           



                                                  over: 13.3           



                                                  hr, Route:           



                                                  IV, Dosing           



                                                  Weight           



                                                  127.273           



                                                  kg, Total           



                                                  Volume:           



                                                  1,000,           



                                                  Start           



                                                  date:           



                                                  18           



                                                  22:39:00           



                                                  CDT,           



                                                  Duration:           



                                                  30 day,           



                                                  Stop date:           



                                                  18           



                                                  22:38:00           



                                                  CDT, 2.36,           



                                                  m2             

 

     Acetaminoph            No                       Notes: Max           

[facility]



     en                                       acetaminop           l



               02:56:                               hen 4000           [place]



               00                                 mg/day (4           



                                                  gm/day).           



                                                  (Same as:           



                                                  Tylenol           



                                                  Extra           



                                                  Strength)           

 

     Rocephin            No                       Notes:           [facility]



                                              (Same As:           l



               02:21:                               Rocephin).           [place]                                   ***           



                                                  MEDICATION           



                                                  WASTE ***           



                                                  Product           



                                                  Size:           



                                                  1000 mg           



                                                  Product           



                                                  Wasted:           



                                                  _0__ mg           

 

     Morphine            No                       4 mg,           [facility]



                                              Route:           l



               00:05:                               IVP, ONCE,           [place]                                 Dosing           



                                                  Weight           



                                                  127.273,           



                                                  kg,            



                                                  Priority:           



                                                  STAT,           



                                                  Start           



                                                  date:           



                                                  18           



                                                  19:05:00           



                                                  CDT, Stop           



                                                  date:           



                                                  18           



                                                  19:05:00           



                                                  CDT            

 

     Benadryl            No                       Notes:           [facility]



               -                               (Same as:           l



               23:14:                               Benadryl)           [place]                                                

 

     Benadryl            No                       Notes:           [facility]



               5-                               (Same as:           l



               22:56:                               Benadryl)           [place]                                                

 

     Morphine      2018-0      No                       4 mg, 1           [facility]

a



               5-03                               mL, Route:           l



               22:56:                               IVP, Drug                                            form:           



                                                  SOLN,           



                                                  ONCE,           



                                                  Dosing           



                                                  Weight           



                                                  127.273,           



                                                  kg,            



                                                  Priority:           



                                                  STAT,           



                                                  Start           



                                                  date:           



                                                  18           



                                                  17:56:00           



                                                  CDT, Stop           



                                                  date:           



                                                  18           



                                                  17:56:00           



                                                  CDT            

 

     oxyCODONE-a      -0      Yes            1{tbl}      Take 1           [facility]

[facility]



     cetaminophe      6-11                               tablet by           [facility] -



     n         00:50:                               mouth           Medical



     (PERCOCET)      37                                 every 4           Center



      mg                                         (four)           



     per tablet                                         hours as           



                                                  needed for           



                                                  Pain.           

 

     Tylenol Tylenol           Yes  [place]                1 tablet           [facility]



     with with                                    as needed           [facility]



     Codeine #4 Codeine #4                                                   [facility]

[facility]



                                                                 l



                                                                 [facility]



                                                                 ent



                                                                 Clinics

 

     Macrobid Macrobid           Yes  [place]                1 capsule          

 [facility]



                                                  with food           [facility]



                                                                 [facility]



                                                                 l



                                                                 Outpati



                                                                 ent



                                                                 [facility]

 

     Pantoprazol Pantoprazol           Yes  [place]                1 tablet     

      [facility]



     e Sodium e Sodium                                                   [facility]



                                                                 [facility]



                                                                 l



                                                                 Outpati



                                                                 ent



                                                                 Clinics

 

     Collagenase Collagenase           Yes  [place]                1            

  [facility]



                                                  applicatio           [facility]



                                                  n to           [facility]



                                                  affected           l



                                                  area           [facility]



                                                                 ent



                                                                 Clinics







Vital Signs







             Vital Name   Observation Time Observation Value Comments     Source

 

             Systolic blood 2020 12:58:00 136 mm[Hg]                [facility]

 

             Diastolic blood 2020 12:58:00 78 mm[Hg]                 [facility]

 

             Heart rate   2020 12:58:00 96 /min                   [facility]

 

             Body temperature 2020 12:58:00 35.78 Tiffanie                 [facility]

 

             Respiratory rate 2020 12:58:00 18 /min                   [facility]

 

             Oxygen saturation in 2020 12:58:00 96 /min                   

[facility]



             Arterial blood by                                        Medical Ce

nter



             Pulse oximetry                                        

 

             Respitory Rate 2018 17:30:00                           Siri

al [facility]

 

             Systolic (mm Hg) 2018 17:30:00                           Chace Garcia

 

             Diastolic (mm Hg) 2018 17:30:00                           [facility]

[facility]

 

             Temperature Oral (F) 2018 17:30:00 98.4 F                    

[facility]

 

             Temperature Oral (F) 2018 16:23:00 98.6 F                    

[facility]

 

             Systolic (mm Hg) 2018 16:23:00                           Chace

rial [facility]

 

             Diastolic (mm Hg) 2018 16:23:00                           [facility]

[facility]

 

             Respitory Rate 2018 14:00:00                           [facility]

[facility] [place]

 

             Systolic (mm Hg) 2018 14:00:00                           [facility]

rial [facility]

 

             Diastolic (mm Hg) 2018 14:00:00                           [facility]

[facility] [place]

 

             Respitory Rate 2018 13:30:00                           [facility]

al [place]

 

             BMI Calculated 2018 10:16:00                           [facility]

[facility]

 

             Height       2018 10:16:00 149.86 cm                 [facility]

 [facility]

 

             Weight       2018 10:16:00                           [facility]

 [facility]

 

             Heart Rate   2018 10:16:00                           [facility]

 [facility]

 

             Temperature Oral (F) 2018 10:16:00 97.9 F                    

[facility] [place]

 

             Temperature Oral (F) 2018 13:40:00 97.2 F                    

[facility]

 

             Systolic (mm Hg) 2018 13:40:00                           Chace

rial [facility]

 

             Diastolic (mm Hg) 2018 13:40:00                           [facility]

[facility]

 

             Heart Rate   2018 13:40:00                           [facility]

 [place]

 

             Respitory Rate 2018 13:40:00                           [facility]

[facility]

 

             Heart Rate   2018 05:24:00                           [facility]

 [place]

 

             Systolic (mm Hg) 2018 05:24:00                           Chace

rial [place]

 

             Diastolic (mm Hg) 2018 05:24:00                           Mem

[facility] [place]

 

             Respitory Rate 2018 05:24:00                           [facility]

al [place]

 

             Temperature Oral (F) 2018 05:24:00 98.1 F                    

[facility] [place]

 

             Respitory Rate 2018 01:15:00                           [facility]

al [place]

 

             Systolic (mm Hg) 2018 01:15:00                           Chace

rial [place]

 

             Diastolic (mm Hg) 2018 01:15:00                           [facility]

[facility]

 

             Heart Rate   2018 01:15:00                           [facility]

 [place]

 

             Temperature Oral (F) 2018 01:15:00 98.1 F                    

[facility]

 

             Weight       2018 14:00:00                           [facility]

 [facility]

 

             Weight       2018 14:00:00                           [facility]

 

             Weight       2018 14:00:00                           [facility]

 [place]

 

             BMI Calculated 2018 05:43:00                           Siri Ernst

 

             Height       2018 05:43:00 162.56 cm                 [facility]

 [facility]

 

             Height       2018 22:41:00 149.86 cm                 [facility]

 [place]

 

             BMI Calculated 2018 22:41:00                           Siri morales [place]







Procedures







                Procedure       Date / Time     Performing Clinician Source



                                Performed                       

 

                RHYTHM STRIP - SCAN 2020 14:11:01 Provider [facility]

 

                RHYTHM STRIP - SCAN 2020 15:32:49 Provider [facility]

 

                POCT-GLUCOSE METER 2020 12:52:00 Salvador [facility]

 

                POCT-GLUCOSE METER 2020 09:03:00 Salvador Past[facility]

 

                POCT-GLUCOSE METER 2020-01-15 20:45:00 Darby [facility]

 

                POCT-GLUCOSE METER 2020-01-15 16:35:00 Darby [facility]

 

                POCT-GLUCOSE METER 2020-01-15 12:08:00 Salvador Pastor [facility]

 

                POCT-GLUCOSE METER 2020-01-15 08:35:00 Salvador Pastor [facility]

 

                POCT-GLUCOSE METER 2020 21:02:00 Salvador Pastor [facility]

 

                MR LOWER EXTREMITY WITHOUT 2020 18:46:00 Salvador Pastor [facility]

 

                POCT-GLUCOSE METER 2020 12:42:00 Darby Salvador [facility]

 

                POCT-GLUCOSE METER 2020 08:24:00 Darby [facility]

 

                POCT-GLUCOSE METER 2020 20:57:00 Darby [facility]

 

                POCT-GLUCOSE METER 2020 18:03:00 Darby [facility]

 

                POCT-GLUCOSE METER 2020 12:18:00 Darby [facility]

 

                POCT-GLUCOSE METER 2020 07:56:00 Darby [facility]

 

                POCT-GLUCOSE METER 2020 21:14:00 Darby [facility]

 

                POCT-GLUCOSE METER 2020 17:22:00 Darby [facility]

 

                POCT-GLUCOSE METER 2020 11:16:00 Darby [facility]

 

                POCT-GLUCOSE METER 2020 08:07:00 Darby [facility]

 

                POCT-GLUCOSE METER 2020 21:20:00 Darby [facility]

 

                POCT-GLUCOSE METER 2020 17:34:00 Darby [facility]

 

                POCT-GLUCOSE METER 2020 11:56:00 Darby [facility]

 

                POCT-GLUCOSE METER 2020 08:11:00 Darby [facility]

 

                BASIC METABOLIC PANEL (7) 2020 06:26:00 Darby Salvador 

[facility]

 

                CBC W/PLT COUNT & AUTO 2020 06:26:00 Darby [facility]

[facility]

 

                POCT-GLUCOSE METER 2020-01-10 19:43:00 Darby [facility]

 

                POCT-GLUCOSE METER 2020-01-10 16:28:00 Darby [facility]

 

                POCT-GLUCOSE METER 2020-01-10 11:18:00 Darby [facility]

 

                POCT-GLUCOSE METER 2020-01-10 07:25:00 Darby [facility]

 

                CAMILA-65          2020-01-10 04:47:00 Yonathan [facility]

 

                ISLET CELL AB SCR 2020-01-10 04:47:00 Yonathan [facility]

 

                LIPID PANEL     2020-01-10 04:47:00 Randall [facility]

 

                HEMOGLOBIN A1C  2020-01-10 04:47:00 Randall [facility]

 

                BASIC METABOLIC PANEL (7) 2020-01-10 04:47:00 Darby Salvador [facility]

 

                ISLET CELL AB SCREEN 2020-01-10 04:47:00 Yonathan [facility]

 

                ISLET CELL AB TITER 2020-01-10 04:47:00 Yonathan [facility]

 

                CBC W/PLT COUNT & AUTO 2020-01-10 04:47:00 Darby [facility]

 

                POCT-GLUCOSE METER 2020 23:35:00 Darby [facility]

 

                PERIPHERAL VASCULAR REPORT 2020 21:24:16 Saran [facility] SCAN                          [facility]

 

                POCT-GLUCOSE METER 2020 18:25:00 Darby [facility]

 

                POCT-GLUCOSE METER 2020 15:09:00 Darby [facility]

 

                BLOOD CULTURE   2020 11:47:00 Darby [facility]

 

                POCT-GLUCOSE METER 2020 10:18:00 Darby [facility]

 

                ARTERIAL DOPPLER LEGS 2020 09:35:00 Randall [facility]

 

                LIPID PANEL     2020 04:07:00 Randall [facility]

 

                HEMOGLOBIN A1C  2020 04:07:00 Ashley Mckee    [facility]

 

                BASIC METABOLIC PANEL (7) 2020 04:07:00 Salvador Pastor [facility]

 

                CBC W/PLT COUNT & AUTO 2020 04:07:00 Darby [facility]

 

                (MANUAL DIFFERENTIAL) 2020 04:07:00 Darby [facility]

 

                POCT-GLUCOSE METER 2020 21:41:00 Darby [facility]

 

                US ABDOMEN LIMITED 2020 18:45:00 Darby [facility]

 

                POCT-GLUCOSE METER 2020 18:27:00 Darby [facility]

 

                CBC W/PLT COUNT & AUTO 2020 16:50:00 Reuben [facility]

 

                (CELLAVISION MANUAL DIFF) 2020 16:50:00 Reuben Vamsi ILEANA

[facility]

 

                URINALYSIS W/ REFLEX URINE 2020 16:40:00 Reuben [facility]

 

                POCT-GLUCOSE METER 2020 16:15:00 Darby [facility]

 

                CT BRAIN WITHOUT IV 2020 15:26:00 [facility] [facility]

 

                XR SHUNT SERIES 2020 14:49:00 [facility]

 

                POCT-GLUCOSE METER 2020 11:23:00 Darby [facility]

 

                POCT-GLUCOSE METER 2020 07:36:00 Darby [facility]

 

                TSH/FREE T4 IF INDICATED 2020 07:08:00 Ashley Mckee    [facility]

 

                BASIC METABOLIC PANEL (7) 2020 07:08:00 Ashley Mckee    [facility]

 

                LIPID PANEL     2020 07:08:00 Ashley Mckee    [facility]

 

                HEMOGLOBIN A1C  2020 07:08:00 Ashley Mckee    [facility]

 

                POCT-GLUCOSE METER 2020 00:37:00 Isela Ang [facility]

 

                WOUND CULTURE + GRAM STAIN 2020 00:27:00 Ashley Mckee

[facility]

 

                URINE CULTURE   2020 00:27:00 Ashley Mckee    [facility]

 

                DRUG SCREEN, URINE, 2020 00:27:00 Ashley Mckee    [facility]

 

                XR FOOT 2 VIEWS RIGHT 2020 21:22:00 Ashley Mckee    [facility]

 

                POCT-GLUCOSE METER 2020 20:52:00 Isela Ang [facility]

 

                Cholecystectomy                                 [facility]

 

                Shunt of cerebral                                 [facility]

nn



                ventricle to extracranial                                 



                site                                            







Plan of Care







             Planned Activity Planned Date Details      Comments     Source

 

             Future Scheduled 2020   INFLUENZA VACCINE              [facility]



             Test         00:00:00     (Season Ended) [code =              Medic

al Center



                                       INFLUENZA VACCINE              



                                       (Season Ended)]              

 

             Future Scheduled 2020-04-10   HEMOGLOBIN A1C [code =              C

[facility]



             [facility]         00:00:00     HEMOGLOBIN A1C]              [facility] Cent

er

 

             Future Scheduled 1991   PNEUMOCOCCAL VACCINE              [facility]



             Test         00:00:00     2-64 YEARS AT RISK (1              Medica

l Center



                                       of 1 - PPSV23) [code =              



                                       PNEUMOCOCCAL VACCINE              



                                       2-64 YEARS AT RISK (1              



                                       of 1 - PPSV23)]              







Encounters







        Start   End     Encounter Admission Attending Care    Care    Encounter 

Source



        Date/Time Date/Time Type    Type    Clinicians Facility Department ID   

   

 

        2019 Outpatient                 MHMISCHER MHMISCHER [id number]

[id number] 



        11:11:26 23:59:59                                         08      

 

        2019 Outpatient                 MHMISCHER MHMISCHER 551

[id number] 



        11:16:47 23:59:59                                         07      

 

        2019-07-15 2019 Outpatient                 MHMISCHER MHMISCHER 551

[id number] 



        10:52:03 23:59:59                                         06      

 

        2019 Outpatient                 MHMISCHER MHMISCHER 551

[id number] 



        13:55:03 23:59:59                                         05      

 

        2018 Outpatient         [facility]   5510

[id number] 



        04:12:00 12:24:00                 Dar Dial      

 

        2018 Outpatient                 [facility]

[id number] [facility]



        11:15:00 11:15:00                         [place]   [place]         Luke

s -



                                                Drive   [facility]



                                                Medicine                 Outpati



                                                                        ent



                                                                        Clinics

 

        2018 Outpatient                 [facility]

[id number] [facility]



        11:30:00 11:30:00                         [place]   [place]         Luke

s -



                                                Drive   [facility]



                                                Medicine                 Outpati



                                                                        ent



                                                                        Clinics

 

        2018 Outpatient                 [facility]

[id number] [facility]



        15:41:00 15:41:00                         t [place]   [place] depict         Luke

s -



                                                Drive   [facility]



                                                Medicine                 Outpati



                                                                        ent



                                                                        Clinics

 

        2018 Outpatient                 [facility]

[id number] [facility]



        15:42:00 15:42:00                         t [place]   [place] depict         Luke

s -



                                                Drive   [facility]



                                                Medicine                 Outpati



                                                                        ent



                                                                        Clinics

 

        2018 Outpatient                 [facility]

[id number] [facility]



        11:00:00 11:00:00                         t [place]   [place]         [place]

s -



                                                Drive   [facility]



                                                Medicine                 Outpati



                                                                        ent



                                                                        Clinics

 

        2018 Outpatient         Deedee Reinoso [facility]   551

[id number] 



        17:40:00 12:28:00                 Jamie                   23      







Results







           Test Description Test Time  Test Comments Results    Result Comments 

Source









                    POC-Glucose meter   2020 13:03:00 









                      Test Item  Value      Reference Range Interpretation Comme

[facility]









             POC-Glucose Meter (test code = 140 mg/dL           H         

   : TESTED AT [facility] [address] [id number])                                               [facility]

30:



                                                                 /Techni

christina ID = [id number] for



                                                                 CLAUDIA DELATORRE

 

             Lab Interpretation (test code = Abnormal                           

    



             62136-9)                                            



[facility] [id number] 13:03:00





             Test Item    Value        Reference Range Interpretation Comments

 

             POC-GLUCOSE METER 140 mg/dL           H            : TESTED A

T [facility] 6720



             (BEAKER) (test code =                                        [facility],



             153)                                               [address]:



                                                                 /Techni

christina ID



                                                                 = [id number] for ANANT CATES



POCT-GLUCOSE [id number] 09:15:00





             Test Item    Value        Reference Range Interpretation Comments

 

             POC-GLUCOSE METER 142 mg/dL           H            : TESTED A

T [facility] 6720



             (BEAKER) (test code =                                        [facility],



             [facility])                                               [address]:



                                                                 /Techni

christina ID



                                                                 = [id number] for ANANT CATES



POCT-GLUCOSE METER2020-01-15 20:56:00





             Test Item    Value        Reference Range Interpretation Comments

 

             POC-GLUCOSE METER 134 mg/dL           H            : TESTED A

T [facility] 6720



             (BEAKER) (test code =                                        [facility],



             [facility])                                               [address]:



                                                                 /Techni

christina ID



                                                                 = [id number] for SHERRILL GUARDADO



POCT-GLUCOSE METER2020-01-15 16:46:00





             Test Item    Value        Reference Range Interpretation Comments

 

             POC-GLUCOSE METER 91 mg/dL                         : TESTED A

T [facility] 6720



             (BEAKER) (test code =                                        [facility],



             [facility])                                               [address]:



                                                                 /Techni

christina ID =



                                                                 [id number] for ANANT HAMILTON



POCT-GLUCOSE METER2020-01-15 12:19:00





             Test Item    Value        Reference Range Interpretation Comments

 

             POC-GLUCOSE METER 237 mg/dL           H            : TESTED A

T [facility] 6720



             (BEAKER) (test code =                                        [facility],



             [facility])                                               [address]:



                                                                 /Techni

christina ID



                                                                 = [id number] for ANANT CATES



MR, EXTREMITY, LOWER, WITHOUT CONTRAST, RIGHT2020-01-15 09:12:00FINAL REPORT 
PATIENT ID:   [id number] MRI of the right and left hindfoot without intravenous 
contrast History: Osteomyelitis, diabetic foot Comparison: Radiograph dated 
2020 Technique: Multiplanar multisequence MRI of the right and left 
hindfoot was performed without intravenous contrast using the hindfoot 
osteomyelitis protocol Findings: Right foot: Marked T1 and T2 hypointense soft 
tissue thickening is noted at the medial aspect of the right heel, likely to 
reflect scar tissue. There is also mild subcutaneous edema at the lateral aspect
of the mid foot and forefoot, incompletely imaged. No discrete drainable fluid 
collection is identified. There is no marrow signal abnormality to suggest 
osteomyelitis. There is a small nonspecific joint effusion at the ankle and 
subtalar joint. Scattered degenerative changes are noted. There is marked fatty 
atrophy of the distal leg and foot musculature. Severe flexor hallucis longus 
tendinopathy. No tenosynovitis. Left foot: There is a large area ofsoft tissue 
defect and granulation tissue at the posteromedial aspect of the heel, reaching 
the calcaneus, but there is no drainable fluid collection. Deformity is noted in
the hindfoot, and the forefoot appears adducted. No acute fracture. No marrow 
signal abnormality is identified to indicate acute osteomyelitis. There is no 
significant joint effusion. There is severe atrophy of the foot and distalleg 
musculature. No significant tenosynovitis identified. IMPRESSION: 
Granulation/scar tissue at themedial aspect of the heel bilaterally. No MR 
evidence of osteomyelitis. Severe muscle atrophy. Signed: Jorge Cornejoort 
Verified Date/Time:  01/15/2020 09:12:01 Reading Location: [facility] C013X Ortho 
Consult Reading Room        Electronically signed by: JORGE CORNEJO M.D. on 
01/15/2020 09:12 AMMR, EXTREMITY, LOWER, WITHOUT CONTRAST, LEFT2020-01-15 
09:12:00FINAL REPORT PATIENT ID:   [id number] MRI of the right and left hindfoot 
without intravenous contrast History: Osteomyelitis, diabetic foot Comparison: 
Radiograph dated 2020 Technique: Multiplanar multisequence MRI of the
right and left hindfoot was performed without intravenous contrast using the 
hindfoot osteomyelitis protocol Findings: Right foot: Marked T1 and T2 
hypointense soft tissue thickening is noted at the medial aspect of the right 
heel, likely to reflect scar tissue. There is also mild subcutaneous edema at 
the lateral aspect of the mid foot and forefoot, incompletely imaged. No 
discrete drainable fluid collection is identified. There is no marrow signal 
abnormality to suggest osteomyelitis. There is a small nonspecific joint 
effusion at the ankle and subtalar joint. Scattered degenerative changes are 
noted. There is marked fatty atrophy of the distal leg and foot musculature. 
Severe flexor hallucis longus tendinopathy. No tenosynovitis. Left foot: There 
is a large area ofsoft tissue defect and granulation tissue at the posteromedial
aspect of the heel, reaching the calcaneus, but there is no drainable fluid 
collection. Deformity is noted in the hindfoot, and the forefoot appears 
adducted. No acute fracture. No marrow signal abnormality is identified to 
indicate acute osteomyelitis. There is no significant joint effusion. There is 
severe atrophy of the foot and distalleg musculature. No significant 
tenosynovitis identified. IMPRESSION: Granulation/scar tissue at themedial 
aspect of the heel bilaterally. No MR evidence of osteomyelitis. Severe muscle 
atrophy. Signed: Jorge Cornejo MDReport Verified Date/Time:  01/15/2020 09:12:01 
Reading Location: [facility] B1 C013X Ortho Consult Reading Room        Electronically 
signed by: JORGE CORNEJO M.D. on 01/15/2020 09:12 AMMR lower extremity without IV 
contrast right side2020-01-15 09:12:00Interface, External Ris In - 01/15/2020  
9:14 AM CSTFINAL REPORT PATIENT ID:   [id number] MRI of the right and left 
hindfoot without intravenous contrast History: Osteomyelitis, diabetic foot 
Comparison:Radiograph dated 2020 Technique: Multiplanar multisequence
MRI of the right and left hindfoot was performed without intravenous contrast 
using the hindfoot osteomyelitis protocol Findings: Right foot: Marked T1 and T2
hypointense soft tissue thickening is noted at the medial aspect of the right 
heel, likely to reflect scar tissue. There is also mild subcutaneous edema at 
the lateral aspectof the mid foot and forefoot, incompletely imaged. No discrete
drainable fluid collection is identified. There is no marrow signal abnormality 
to suggest osteomyelitis. There is a small nonspecific joint effusion at the 
ankle and subtalar joint. Scattered degenerative changes are noted. There is 
marked fatty atrophy of the distal leg and foot musculature. Severe flexor 
hallucis longus tendinopathy. No tenosynovitis. Left foot: There is a large area
of soft tissue defect and granulation tissue at theposteromedial aspect of the 
heel, reaching the calcaneus, but there is no drainable fluid collection. 
Deformity is noted in the hindfoot, and the forefoot appears adducted. No acute 
fracture. No marrowsignal abnormality is identified to indicate acute 
osteomyelitis. There is no significant joint effusion. There is severe atrophy 
of the foot and distal leg musculature. No significant tenosynovitis identified.
IMPRESSION: Granulation/scar tissue at the medial aspect of the heel 
bilaterally. No MR evidence of osteomyelitis. Severe muscle atrophy. Signed: 
Jorge Cornejo Verified Date/Time:  01/15/2020 09:12:01 Reading Location: [address] Ortho Consult Reading Room        Electronically signed by: JORGE CORNEJO M.D. on 01/15/2020 09:12 [facility] 
without IV contrast left side2020-01-15 09:12:00Interface, External Ris In - 
01/15/2020  9:14 AM CSTFINAL REPORT PATIENT ID:   [id number] MRI of the right and 
left hindfoot without intravenous contrast History: Osteomyelitis, diabetic foot
Comparison:Radiograph dated 2020 Technique: Multiplanar multisequence
MRI of the right and left hindfoot was performed without intravenous contrast 
using the hindfoot osteomyelitis protocol Findings: Right foot: Marked T1 and T2
hypointense soft tissue thickening is noted at the medial aspect of the right 
heel, likely to reflect scar tissue. There is also mild subcutaneous edema at 
the lateral aspectof the mid foot and forefoot, incompletely imaged. No discrete
drainable fluid collection is identified. There is no marrow signal abnormality 
to suggest osteomyelitis. There is a small nonspecific joint effusion at the 
ankle and subtalar joint. Scattered degenerative changes are noted. There is 
marked fatty atrophy of the distal leg and foot musculature. Severe flexor 
hallucis longus tendinopathy. No tenosynovitis. Left foot: There is a large area
of soft tissue defect and granulation tissue at theposteromedial aspect of the 
heel, reaching the calcaneus, but there is no drainable fluid collection. 
Deformity is noted in the hindfoot, and the forefoot appears adducted. No acute 
fracture. No marrowsignal abnormality is identified to indicate acute 
osteomyelitis. There is no significant joint effusion. There is severe atrophy 
of the foot and distal leg musculature. No significant tenosynovitis identified.
IMPRESSION: Granulation/scar tissue at the medial aspect of the heel 
bilaterally. No MR evidence of osteomyelitis. Severe muscle atrophy. Signed: 
Jorge Cornejo Verified Date/Time:  01/15/2020 09:12:01 Reading Location: [facility] C013X Ortho Consult Reading Room        Electronically signed by: JORGE CORNEJO M.D. on 01/15/2020 09:12 [facility]
2020-01-15 08:47:00





             Test Item    Value        Reference Range Interpretation Comments

 

             POC-GLUCOSE METER 264 mg/dL           H            : TESTED A

T [facility] 67[address]



             (KUN) (test code =                                        MILY NELSON [facility],



             1538)                                               [address]:



                                                                 /Techni

christina ID



                                                                 = [id number] for ANANT CATES



Islet Cell AB Screen2020-01-15 07:43:00





             Test Item    Value        Reference Range Interpretation Comments

 

             Islet Cell Ab Profile Refer to individual                          

 



             (test code = 2556) Islet Cell Ab and/or                           



                          Islet Cell Ab Titer                           



                          results.                               



[facility] [id number] 07:43:00





             Test Item    Value        Reference Range Interpretation Comments

 

             ISLET CELL AB Refer to individual                           



             AUTOVERIFICATION (test Islet Cell Ab                           



             code = 2556) and/or Islet Cell                           



                          Ab Titer results.                           



POCT-GLUCOSE [id number] 21:27:00





             Test Item    Value        Reference Range Interpretation Comments

 

             POC-GLUCOSE METER 130 mg/dL           H            : TESTED A

T [facility] [address]



             (KUN) (test code =                                        MILY NELSON [place],



             1538)                                               [address]:



                                                                 /Techni

christina ID



                                                                 = [id number] for KRANTHI PIERRE



Islet Cell Ab [id number] 20:22:00





             Test Item    Value        Reference Range Interpretation Comments

 

             Islet Cell   NEGATIVE     NEGATIVE                   This test was 

developed



             Ab (test                                            and its analyti

sarah



             code =                                              performance



             0886817)                                            characteristics

 havebeen



                                                                 determined by [facility]

uest



                                                                 [facility].It h

as not



                                                                 been cleared or

 approved



                                                                 by FDA. This as

say has



                                                                 been validatedp

ursuant



                                                                 to the CLIA reg

ulations



                                                                 and is used for

 clinical



                                                                 purposes.

 

             MAL (test    Performing Lab                           



             code = MAL)  [facility]     [facility]                           



                          Diagnostics                            



                          [facility]                           



                             [address] [facility] [place]                           



                          [address]     CRISTIANO Lundy MD, PhD,                           



                          MARK                                    



[facility] Center[facility] Ab [id number] 20:22:00





             Test Item    Value        Reference Range Interpretation Comments

 

             Islet Cell   TNP          LESS THAN 1.25              Test Not Perf

ormed.



             Ab Titer                  JDF units                 Screening test 

Negative



             (test code =                                        or Not Detected

. Titer



             50211-5)                                            notperformed. N

OTE:  End



                                                                 point titers ar

e



                                                                 compared to a s

kathy



                                                                 international



                                                                 referencestanda

rd and



                                                                 values are repo

rted in



                                                                 JDF (Juvenile D

iabetes



                                                                 Foundation) uni

ts.

 

             MAL (test    Performing Lab                           



             code = MAL)  EZ     [facility]                           



                          Diagnostics                            



                          [facility]                           



                             [address] [place], [place]                           



                          [address]     CRISTIANO Lundy MD, PhD,                           



                          MARK                                    



[facility] [id number] 15:11:00





             Test Item    Value        Reference Range Interpretation Comments

 

             CAMILA-65       <5           <5 IU/mL                   This test was 

performed



             (test code                                          using the GAD65

 EDIL



             = 3214)                                             method which is



                                                                 standardizedaga

inst the



                                                                 International r

eference



                                                                 preparation 97/

550.

 

             MAL (test    Performing Lab                           



             code = MAL)  EZ     [facility]                           



                          Diagnostics                            



                          [facility]                           



                             [address] [place], [place]                           



                          [address]     CRISTIANO Lundy MD, PhD,                           



                          MARK                                    



[facility] Center[facility] [id number] 13:00:00





             Test Item    Value        Reference Range Interpretation Comments

 

             Result (test code = No growth in 5 days                           



             6463-4)                                             



[facility] [id number] 13:00:00





             Test Item    Value        Reference Range Interpretation Comments

 

             CULTURE (BEAKER) (test No growth in 5 days                         

  



             code = 1095)                                        



BLOOD [id number] 13:00:00





             Test Item    Value        Reference Range Interpretation Comments

 

             CULTURE (BEAKER) (test No growth in 5 days                         

  



             code = 1095)                                        



POCT-GLUCOSE [id number] 12:57:00





             Test Item    Value        Reference Range Interpretation Comments

 

             POC-GLUCOSE METER 138 mg/dL           H            : TESTED A

T [facility] 6720



             (BEAKER) (test code =                                        [facility],



             1538)                                               [address]:



                                                                 /Techni

christina ID



                                                                 = [id number] for [place]

LLIS,



                                                                 BARBARA



POCT-GLUCOSE [id number] 08:43:00





             Test Item    Value        Reference Range Interpretation Comments

 

             POC-GLUCOSE METER 226 mg/dL           H            : TESTED A

T [facility] 6720



             (BEAKER) (test code =                                        [facility],



             1538)                                               [address]:



                                                                 /Techni

christina ID



                                                                 = [id number] for [place]

LLIS,



                                                                 BARBARA



POCT-GLUCOSE [id number] 21:11:00





             Test Item    Value        Reference Range Interpretation Comments

 

             POC-GLUCOSE METER 254 mg/dL           H            : Notified

 RN/MD:



             ([facility]) (test code =                                        TESTED

 AT [facility] [address]



             1538)                                               RAYO [facility],



                                                                 [address]:



                                                                 /Techni

christina ID



                                                                 = [id number] for KRANTHI PIERRE



POCT-GLUCOSE [id number] 21:02:00





             Test Item    Value        Reference Range Interpretation Comments

 

             POC-GLUCOSE METER 177 mg/dL           H            : TESTED A

T [facility] [address]



             ([facility]) (test code =                                        [facility],



             153)                                               [address]:



                                                                 /Techni

christina ID



                                                                 = [id number] for [place]

LLIS,



                                                                 BARBARA



POCT-GLUCOSE [id number] 12:31:00





             Test Item    Value        Reference Range Interpretation Comments

 

             POC-GLUCOSE METER 215 mg/dL           H            : TESTED A

T [facility] [address]



             ([facility]) (test code =                                        [facility],



             153)                                               [address]:



                                                                 /Techni

christina ID



                                                                 = [id number] for [place]

LLIS,



                                                                 BARBARA



WOUND CULTURE + GRAM [id number] 10:10:00





             Test Item    Value        Reference    Interpretation Comments



                                       Range                     

 

             CULTURE ([facility]) PROTEUS MIRABILIS              A            1+ Pro

teus



             (test code = 1095)                                        mirabilis

 

             Amikacin (test code =                           S            



             1)                                                  

 

             Ampicillin +                           S            



             Sulbactam (test code                                        



             = 6)                                                

 

             Aztreonam (test code                           S            



             = 32)                                               

 

             Cefepime (test code =                           S            



             51)                                                 

 

             Cefoxitin (test code                           R            



             = 68)                                               

 

             Ceftazidime (test                           S            



             code = 27)                                          

 

             Ceftriaxone (test                           S            



             code = 52)                                          

 

             Ertapenem (test code                           S            



             = 38)                                               

 

             Gentamicin (test code                           S            



             = 18)                                               

 

             Levofloxacin (test                           R            



             code = 22)                                          

 

             Meropenem (test code                           S            



             = 34)                                               

 

             Piperacillin +                           S            



             Tazobactam (test code                                        



             = 29)                                               

 

             Tetracycline (test                           R            



             code = 2)                                           

 

             Tobramycin (test code                           S            



             = 25)                                               

 

             Trimethoprim +                           R            



             Sulfamethoxazole                                        



             (test code = 47)                                        

 

             CULTURE (AKER) METHICILLIN               A            1+ Methicil

bryn



             (test code = 1095) RESISTANT                              resistant



                          STAPHYLOCOCCUS                           Staphylococcu

s



                          AUREUS                                 aureus

 

             Clindamycin (test                           R            



             code = 10)                                          

 

             Erythromycin (test                           R            



             code = 4)                                           

 

             Linezolid (test code                           S            



             = 40)                                               

 

             Nitrofurantoin (test                           S            



             code = 23)                                          

 

             Oxacillin (test code                           R            



             = 14)                                               

 

             Rifampin (test code =                           S            



             43)                                                 

 

             Tetracycline (test                           S            



             code = 2)                                           

 

             Trimethoprim +                           S            



             Sulfamethoxazole                                        



             (test code = 47)                                        

 

             Vancomycin (test code                           S            



             = 13)                                               

 

             CULTURE (BEAKER) ESCHERICHIA COLI              A            <1+ Esc

herichia



             (test code = 1095)                                        coliESBL 

Positive

 

             Amikacin (test code =                           S            



             1)                                                  

 

             Ampicillin +                           R            



             Sulbactam (test code                                        



             = 6)                                                

 

             Aztreonam (test code                           R            



             = 32)                                               

 

             Cefepime (test code =                           R            



             51)                                                 

 

             Cefoxitin (test code                           R            



             = 68)                                               

 

             Ceftazidime (test                           R            



             code = 27)                                          

 

             Ceftriaxone (test                           R            



             code = 52)                                          

 

             Ertapenem (test code                           S            



             = 38)                                               

 

             Gentamicin (test code                           S            



             = 18)                                               

 

             Levofloxacin (test                           R            



             code = 22)                                          

 

             Meropenem (test code                           S            



             = 34)                                               

 

             Nitrofurantoin (test                           S            



             code = 23)                                          

 

             Piperacillin +                           R            



             Tazobactam (test code                                        



             = 29)                                               

 

             Tetracycline (test                           S            



             code = 2)                                           

 

             Tobramycin (test code                           S            



             = 25)                                               

 

             Trimethoprim +                           R            



             Sulfamethoxazole                                        



             (test code = 47)                                        

 

             CULTURE (BEAKER)                           A            Beta-hemoly

tic



             (test code = 1095)                                        streptoco

ccus



                                                                 group B, by



                                                                 serological



                                                                 grouping

 

             GRAM STAIN RESULT 3+ WBCs                                



             (BEAKER) (test code =                                        



             1123)                                               

 

             GRAM STAIN RESULT 1+ gram negative                           



             (BEAKER) (test code = rods                                   



             935347)                                             

 

             GRAM STAIN RESULT 2+ gram positive                           



             (BEAKER) (test code = rods                                   



             901004)                                             

 

             GRAM STAIN RESULT <1+ gram negative                           



             (BEAKER) (test code = coccobacilli                           



             646763)                                             

 

             GRAM STAIN RESULT 2+ gram positive                           



             (BEAKER) (test code = cocci in pairs                           



             003001)                                             



POCT-GLUCOSE [id number] 08:52:00





             Test Item    Value        Reference Range Interpretation Comments

 

             POC-GLUCOSE METER 209 mg/dL           H            : TESTED A

T [facility] [address]



             ([employer]) (test code =                                        [facility]

[employer] [facility],



             1538)                                               [address]:



                                                                 /Techni

christina ID



                                                                 = [id number] for BARBARA HARKINS



POCT-GLUCOSE [id number] 21:26:00





             Test Item    Value        Reference Range Interpretation Comments

 

             POC-GLUCOSE METER 255 mg/dL           H            : TESTED A

T [facility] [address]



             ([employer]) (test code =                                        [facility]

[facility],



             1538)                                               [address]:



                                                                 /Techni

christina ID



                                                                 = [id number] for SHERIRLL GUARDADO



POCT-GLUCOSE [id number] 17:34:00





             Test Item    Value        Reference Range Interpretation Comments

 

             POC-GLUCOSE METER 227 mg/dL           H            : TESTED A

T [facility] [address]



             (BEAKER) (test code =                                        [facility],



             [facility])                                               [address]:



                                                                 /Techni

christina ID



                                                                 = [id number] for WASSERMAN

DALTON,



                                                                 NAKITA



POCT-GLUCOSE [id number] 11:28:00





             Test Item    Value        Reference Range Interpretation Comments

 

             POC-GLUCOSE METER 282 mg/dL           H            : TESTED A

T [facility] 6720



             (BEAKER) (test code =                                        [facility],



             [facility])                                               [address]:



                                                                 /Techni

christina ID



                                                                 = [id number] for WASSERMAN

NNY,



                                                                 NAKITA



POCT-GLUCOSE [id number] 08:19:00





             Test Item    Value        Reference Range Interpretation Comments

 

             POC-GLUCOSE METER 329 mg/dL           H            : TESTED A

T [facility] [address]



             (BEAKER) (test code =                                        [facility],



             [facility])                                               [address]:



                                                                 /Techni

christina ID



                                                                 = [id number] for ANANT CATES



POCT-GLUCOSE [id number] 21:32:00





             Test Item    Value        Reference Range Interpretation Comments

 

             POC-GLUCOSE METER 275 mg/dL           H            : TESTED A

T [facility] 6720



             (BEAKER) (test code =                                        [facility],



             1538)                                               [address]:



                                                                 /Techni

christina ID



                                                                 = [id number] for SHERRILL GUARDADO



POCT-GLUCOSE [id number] 17:47:00





             Test Item    Value        Reference Range Interpretation Comments

 

             POC-GLUCOSE METER 304 mg/dL           H            : TESTED A

T [facility] 6720



             (BEAKER) (test code =                                        [facility],



             1538)                                               [address]:



                                                                 /Techni

christina ID



                                                                 = [id number] for [place]

RIN,



                                                                 LUIZA



URINE [id number] 15:21:00





             Test Item    Value        Reference Range Interpretation Comments

 

             CULTURE (BEAKER)                           A            >100,000 co

l/mL



             (test code = 1095)                                        Beta-hemo

lytic



                                                                 streptococcus g

roup



                                                                 B, by serologic

al



                                                                 grouping

 

             CULTURE (BEAKER) PROTEUS                   A            80-89,000 c

ol/mL



             (test code = 1095) MIRABILIS                              Proteus



                                                                 mirabilisESBL



                                                                 Positive

 

             Amikacin (test code =                           S            



             1)                                                  

 

             Ampicillin +                           R            



             Sulbactam (test code                                        



             = 6)                                                

 

             Aztreonam (test code                           R            



             = 32)                                               

 

             Cefepime (test code =                           R            



             51)                                                 

 

             Cefoxitin (test code                           R            



             = 68)                                               

 

             Ceftazidime (test                           R            



             code = 27)                                          

 

             Ceftriaxone (test                           R            



             code = 52)                                          

 

             Ertapenem (test code                           S            



             = 38)                                               

 

             Gentamicin (test code                           R            



             = 18)                                               

 

             Levofloxacin (test                           R            



             code = 22)                                          

 

             Meropenem (test code                           S            



             = 34)                                               

 

             Nitrofurantoin (test                           R            



             code = 23)                                          

 

             Tetracycline (test                           R            



             code = 2)                                           

 

             Tobramycin (test code                           R            



             = 25)                                               



POCT-GLUCOSE [id number] 12:16:00





             Test Item    Value        Reference Range Interpretation Comments

 

             POC-GLUCOSE METER 337 mg/dL           H            : TESTED A

T [facility] [address]



             (BEAKER) (test code =                                        [place],



             1538)                                               [address]:



                                                                 /Techni

christina ID



                                                                 = [id number] for LUIZA FIGUEROA



Basic Metabolic [id number] 10:39:00





             Test Item    Value        Reference Range Interpretation Comments

 

             Sodium (test code = 134 meq/L    136-145      L            



             2951-2)                                             

 

             Potassium (test code = 4.6 meq/L    3.5-5.1                   



             2823-3)                                             

 

             Chloride (test code = 105 meq/L                        



             2075-0)                                             

 

             CO2 (test code = 20 meq/L     22-29        L            



             2028-9)                                             

 

             BUN (test code = 14 mg/dL     7-21                      



             3094-0)                                             

 

             Creatinine (test code 0.97 mg/dL   0.57-1.25                 



             = 2160-0)                                           

 

             Glucose (test code = 429 mg/dL           HH           



             2345-7)                                             

 

             Calcium (test code = 8.9 mg/dL    8.4-10.2                  



             97895-3)                                            

 

             EGFR (test code = 107          mL/min/1.73 sq m              ESTIMA

HUMA GFR IS



             33914-3)                                            NOT AS ACCURATE



                                                                 AS CREATININE



                                                                 CLEARANCE IN



                                                                 PREDICTING



                                                                 GLOMERULAR



                                                                 FILTRATION RATE

.



                                                                 ESTIMATED GFR I

S



                                                                 NOT APPLICABLE



                                                                 FOR DIALYSIS



                                                                 PATIENTS.

 

             MAL (test code = MAL)  ID -                           



                          MAGAN TRENT                             

 

             Lab Interpretation Abnormal                               



             (test code = 68497-5)                                        



[facility] [id number] 10:39:00





             Test Item    Value        Reference Range Interpretation Comments

 

             SODIUM (BEAKER) 134 meq/L    136-145      L            



             (test code = 381)                                        

 

             POTASSIUM (BEAKER) 4.6 meq/L    3.5-5.1                   



             (test code = 379)                                        

 

             CHLORIDE (BEAKER) 105 meq/L                        



             (test code = 382)                                        

 

             CO2 (BEAKER) (test 20 meq/L     22-29        L            



             code = 355)                                         

 

             BLOOD UREA NITROGEN 14 mg/dL     7-21                      



             (BEAKER) (test code                                        



             = 354)                                              

 

             CREATININE (BEAKER) 0.97 mg/dL   0.57-1.25                 



             (test code = 358)                                        

 

             GLUCOSE RANDOM 429 mg/dL           HH           



             (BEAKER) (test code                                        



             = 652)                                              

 

             CALCIUM (BEAKER) 8.9 mg/dL    8.4-10.2                  



             (test code = 697)                                        

 

             EGFR (BEAKER) (test 107 mL/min/1.73                           ESTIM

ATED GFR IS



             code = 1092) sq m                                   NOT AS ACCURATE

 AS



                                                                 CREATININE



                                                                 CLEARANCE IN



                                                                 PREDICTING



                                                                 GLOMERULAR



                                                                 FILTRATION RATE

.



                                                                 ESTIMATED GFR I

S



                                                                 NOT APPLICABLE 

FOR



                                                                 DIALYSIS PATIEN

TS.



 ID - MAGAN FPOCT-GLUCOSE [id number] 08:29:00





             Test Item    Value        Reference Range Interpretation Comments

 

             POC-GLUCOSE METER 395 mg/dL           H            : TESTED A

[facility]



             ([facility]) (test code =                                        [place],



             1538)                                               [address]:



                                                                 /Techni

christina ID



                                                                 = [id number] for LUIZA FIGUEROA



CBC with platelet count + automated [id number] 06:40:00





             Test Item    Value        Reference Range Interpretation Comments

 

             WBC (test code = 6690-2) 7.2          3.5- 10.5 K/L              

 

             RBC (test code = 789-8) 5.48         4.63- 6.08 M/L              

 

             MCHC (test code = 786-4) 32.5         32.3- 36.5 GM/DL             

 

 

             Hematocrit (test code = 4544-3) 46.4 %       40.1-51               

    

 

             MCV (test code = 787-2) 84.7 fL      79-92.2                   

 

             MCH (test code = 785-6) 27.6 pg      25.7-32.2                 

 

             RDW (test code = 788-0) 15.5 %       11.6-14.4    H            

 

             Platelets (test code = 777-3) 315          150- 450 K/CU MM        

      

 

             MPV (test code = 71917-8) 10.5 fL      9.4-12.4                  

 

             nRBC (test code = 413) 0            0- 0 /100 WBC              

 

             % Neutros (test code = 429) 62 %                                   

 

             % Lymphs (test code = 430) 26 %                                   

 

             % Monos (test code = 431) 9 %                                    

 

             % Eos (test code = 432) 2 %                                    

 

             % Baso (test code = 437) 0 %                                    

 

             # Neutros (test code = 670) 4.48         1.78- 5.38 K/L          

    

 

             # Lymphs (test code = 414) 1.87         1.32- 3.57 K/L           

   

 

             # Monos (test code = 415) 0.62         0.30- 0.82 K/L            

  

 

             # Eos (test code = 416) 0.17         0.04- 0.54 K/L              

 

             # Baso (test code = 417) 0.03         0.01- 0.08 K/L             

 

 

             Immature Granulocytes-Relative 1 %          0-1                    

   



             (test code = 2801)                                        

 

             Lab Interpretation (test code = Abnormal                           

    



             38979-9)                                            



[facility] W/PLT COUNT &amp; AUTO [id number] 
06:40:00





             Test Item    Value        Reference Range Interpretation Comments

 

             WHITE BLOOD CELL COUNT (BEAKER) 7.2 K/ L     3.5-10.5              

    



             (test code = 775)                                        

 

             RED BLOOD CELL COUNT (BEAKER) 5.48 M/ L    4.63-6.08               

  



             (test code = 761)                                        

 

             HEMOGLOBIN (BEAKER) (test code = 15.1 GM/DL   13.7-17.5            

     



             410)                                                

 

             HEMATOCRIT (BEAKER) (test code = 46.4 %       40.1-51.0            

     



             411)                                                

 

             MEAN CORPUSCULAR VOLUME (BEAKER) 84.7 fL      79.0-92.2            

     



             (test code = 753)                                        

 

             MEAN CORPUSCULAR HEMOGLOBIN 27.6 pg      25.7-32.2                 



             (BEAKER) (test code = 751)                                        

 

             MEAN CORPUSCULAR HEMOGLOBIN CONC 32.5 GM/DL   32.3-36.5            

     



             (BEAKER) (test code = 752)                                        

 

             RED CELL DISTRIBUTION WIDTH 15.5 %       11.6-14.4    H            



             (BEAKER) (test code = 412)                                        

 

             PLATELET COUNT (BEAKER) (test 315 K/CU MM  150-450                 

  



             code = 756)                                         

 

             MEAN PLATELET VOLUME (BEAKER) 10.5 fL      9.4-12.4                

  



             (test code = 754)                                        

 

             NUCLEATED RED BLOOD CELLS 0 /100 WBC   0-0                       



             (BEAKER) (test code = 413)                                        

 

             NEUTROPHILS RELATIVE PERCENT 62 %                                  

 



             (BEAKER) (test code = 429)                                        

 

             LYMPHOCYTES RELATIVE PERCENT 26 %                                  

 



             (BEAKER) (test code = 430)                                        

 

             MONOCYTES RELATIVE PERCENT 9 %                                    



             (BEAKER) (test code = 431)                                        

 

             EOSINOPHILS RELATIVE PERCENT 2 %                                   

 



             (BEAKER) (test code = 432)                                        

 

             BASOPHILS RELATIVE PERCENT 0 %                                    



             (BEAKER) (test code = 437)                                        

 

             NEUTROPHILS ABSOLUTE COUNT 4.48 K/ L    1.78-5.38                 



             (BEAKER) (test code = 670)                                        

 

             LYMPHOCYTES ABSOLUTE COUNT 1.87 K/ L    1.32-3.57                 



             (BEAKER) (test code = 414)                                        

 

             MONOCYTES ABSOLUTE COUNT (BEAKER) 0.62 K/ L    0.30-0.82           

      



             (test code = 415)                                        

 

             EOSINOPHILS ABSOLUTE COUNT 0.17 K/ L    0.04-0.54                 



             (BEAKER) (test code = 416)                                        

 

             BASOPHILS ABSOLUTE COUNT (BEAKER) 0.03 K/ L    0.01-0.08           

      



             (test code = 417)                                        

 

             IMMATURE GRANULOCYTES-RELATIVE 1 %          0-1                    

   



             PERCENT (BEAKER) (test code =                                      

  



             2801)                                               



POCT-GLUCOSE METER2020-01-10 19:55:00





             Test Item    Value        Reference Range Interpretation Comments

 

             POC-GLUCOSE METER 369 mg/dL           H            : TESTED A

T [facility] [address]



             (BEAKER) (test code =                                        [facility],



             153)                                               [address]:



                                                                 /Techni

christina ID



                                                                 = [id number] for SHERRILL GUARDADO



POCT-GLUCOSE METER2020-01-10 16:45:00





             Test Item    Value        Reference Range Interpretation Comments

 

             POC-GLUCOSE METER 272 mg/dL           H            : TESTED A

T [facility] [address]



             (BEAKER) (test code =                                        [facility],



             153)                                               [address]:



                                                                 /Techni

christina ID



                                                                 = [id number] for SARAH VIDES



POCT-GLUCOSE METER2020-01-10 11:40:00





             Test Item    Value        Reference Range Interpretation Comments

 

             POC-GLUCOSE METER 277 mg/dL           H            : TESTED A

T [facility] [address]



             (BEAKER) (test code =                                        [facility],



             1538)                                               [address]:



                                                                 /Techni

christina ID



                                                                 = [id number] for SARAH VIDES



BASIC METABOLIC PANEL2020-01-10 10:22:00





             Test Item    Value        Reference Range Interpretation Comments

 

             SODIUM (BEAKER) 132 meq/L    136-145      L            



             (test code = 381)                                        

 

             POTASSIUM (BEAKER) 4.4 meq/L    3.5-5.1                   



             (test code = 379)                                        

 

             CHLORIDE (BEAKER) 103 meq/L                        



             (test code = 382)                                        

 

             CO2 (BEAKER) (test 21 meq/L     22-29        L            



             code = 355)                                         

 

             BLOOD UREA NITROGEN 14 mg/dL     7-21                      



             (BEAKER) (test code                                        



             = 354)                                              

 

             CREATININE (BEAKER) 0.89 mg/dL   0.57-1.25                 



             (test code = 358)                                        

 

             GLUCOSE RANDOM 428 mg/dL           HH           



             (BEAKER) (test code                                        



             = 652)                                              

 

             CALCIUM (BEAKER) 9.2 mg/dL    8.4-10.2                  



             (test code = 697)                                        

 

             EGFR (BEAKER) (test 119 mL/min/1.73                           ESTIM

ATED GFR IS



             code = 1092) sq m                                   NOT AS ACCURATE

 AS



                                                                 CREATININE



                                                                 CLEARANCE IN



                                                                 PREDICTING



                                                                 GLOMERULAR



                                                                 FILTRATION RATE

.



                                                                 ESTIMATED GFR I

S



                                                                 NOT APPLICABLE 

FOR



                                                                 DIALYSIS PATIEN

TS.



 ID - NTPLipid panel2020-01-10 10:17:00





             Test Item    Value        Reference Range Interpretation Comments

 

             Triglycerides (test 692 mg/dL                              



             code = 2571-8)                                        

 

             Cholesterol (test code 196 mg/dL                              



             = 2093-3)                                           

 

             HDL (test code = 24 mg/dL                               



             5-9)                                             

 

             MAL (test code = MAL) Calculated LDL not                           



                          valid if triglyceride                           



                          >400 mg/dLTriglyceride                           



                          Reference Range:   Low                           



                          Risk         <150                           



                          Borderline    150-199                           



                           High Risk     200-499                           



                            Very High Risk                           



                          >=500 Cholesterol                           



                          Reference Range:   Low                           



                          Risk         <200                           



                          Borderline    200-239                           



                            High Risk                            



                          >240 HDL Cholesterol                           



                          Reference Range:   Low                           



                          Risk         >=60                           



                          High Risk         <40                           



                          LDL Cholesterol                           



                          Reference Range:                           



                          Optimal          <100                           



                           Near Optimal  100-129                           



                            Borderline                           



                          130-159   High                           



                           160-189   Very High                           



                              >=190                               



                          ID - [id number]                               



[facility] 10[facility]17[facility]





             Test Item    Value        Reference Range Interpretation Comments

 

             TRIGLYCERIDES (BEAKER) (test code = 692 mg/dL                      

        



             540)                                                

 

             CHOLESTEROL (BEAKER) (test code = 196 mg/dL                        

      



             631)                                                

 

             HDL CHOLESTEROL (BEAKER) (test code 24 mg/dL                       

        



             = 976)                                              



Calculated LDL not valid if triglyceride &gt;400 mg/dLTriglyceride Reference 
Range:   Low Risk  &lt;150   Borderline    150-199   High Risk     200-499   
Very High Risk  &gt;=500Cholesterol Reference Range:   Low Risk         &lt;200 
 Borderline    200-239    High Risk        &gt;240HDL Cholesterol Reference 
Range:   Low Risk         &gt;=60   High Risk         &lt;40LDL Cholesterol 
ReferenceRange:   Optimal          &lt;100   Near Optimal  100-129   Borderline 
  130-159   High          160-189   Very High       &gt;=190    ID - [id number]
POCT-GLUCOSE METER2020-01-10 08:28:00





             Test Item    Value        Reference Range Interpretation Comments

 

             POC-GLUCOSE METER 388 mg/dL           H            : TESTED A

T [facility] [address]



             (BEAKER) (test code =                                        [place],



             [address]38)                                               [address]:



                                                                 /Techni

christina ID



                                                                 = [id number] for ANANT CATES



Hemoglobin A1c2020-01-10 07:54:00





             Test Item    Value        Reference Range Interpretation Comments

 

             Hemoglobin A1C (test code = 4548-4) 10.4 %       4.3-6.1      H    

        

 

             Lab Interpretation (test code = Abnormal                           

    



             02039-8)                                            



[facility]2020-01-10 07:54:00





             Test Item    Value        Reference Range Interpretation Comments

 

             HEMOGLOBIN A1C (BEAKER) (test code = 10.4 %       4.3-6.1      H   

         



             368)                                                



CBC W/PLT COUNT &amp; AUTO DIFFERENTIAL2020-01-10 05:56:00





             Test Item    Value        Reference Range Interpretation Comments

 

             WHITE BLOOD CELL COUNT (BEAKER) 6.5 K/ L     3.5-10.5              

    



             (test code = 775)                                        

 

             RED BLOOD CELL COUNT (BEAKER) 5.21 M/ L    4.63-6.08               

  



             (test code = 761)                                        

 

             HEMOGLOBIN (BEAKER) (test code = 14.6 GM/DL   13.7-17.5            

     



             410)                                                

 

             HEMATOCRIT (BEAKER) (test code = 44.3 %       40.1-51.0            

     



             411)                                                

 

             MEAN CORPUSCULAR VOLUME (BEAKER) 85.0 fL      79.0-92.2            

     



             (test code = 753)                                        

 

             MEAN CORPUSCULAR HEMOGLOBIN 28.0 pg      25.7-32.2                 



             (BEAKER) (test code = 751)                                        

 

             MEAN CORPUSCULAR HEMOGLOBIN CONC 33.0 GM/DL   32.3-36.5            

     



             (BEAKER) (test code = 752)                                        

 

             RED CELL DISTRIBUTION WIDTH 15.6 %       11.6-14.4    H            



             (BEAKER) (test code = 412)                                        

 

             PLATELET COUNT (BEAKER) (test 294 K/CU MM  150-450                 

  



             code = 756)                                         

 

             MEAN PLATELET VOLUME (BEAKER) 11.2 fL      9.4-12.4                

  



             (test code = 754)                                        

 

             NUCLEATED RED BLOOD CELLS 0 /100 WBC   0-0                       



             (BEAKER) (test code = 413)                                        

 

             NEUTROPHILS RELATIVE PERCENT 56 %                                  

 



             (BEAKER) (test code = 429)                                        

 

             LYMPHOCYTES RELATIVE PERCENT 31 %                                  

 



             (BEAKER) (test code = 430)                                        

 

             MONOCYTES RELATIVE PERCENT 10 %                                   



             (BEAKER) (test code = 431)                                        

 

             EOSINOPHILS RELATIVE PERCENT 2 %                                   

 



             (BEAKER) (test code = 432)                                        

 

             BASOPHILS RELATIVE PERCENT 1 %                                    



             (BEAKER) (test code = 437)                                        

 

             NEUTROPHILS ABSOLUTE COUNT 3.66 K/ L    1.78-5.38                 



             (BEAKER) (test code = 670)                                        

 

             LYMPHOCYTES ABSOLUTE COUNT 2.03 K/ L    1.32-3.57                 



             (BEAKER) (test code = 414)                                        

 

             MONOCYTES ABSOLUTE COUNT (BEAKER) 0.63 K/ L    0.30-0.82           

      



             (test code = 415)                                        

 

             EOSINOPHILS ABSOLUTE COUNT 0.15 K/ L    0.04-0.54                 



             (BEAKER) (test code = 416)                                        

 

             BASOPHILS ABSOLUTE COUNT (BEAKER) 0.03 K/ L    0.01-0.08           

      



             (test code = 417)                                        

 

             IMMATURE GRANULOCYTES-RELATIVE 1 %          0-1                    

   



             PERCENT (BEAKER) (test code =                                      

  



             2801)                                               



POCT-GLUCOSE [id number] 23:47:00





             Test Item    Value        Reference Range Interpretation Comments

 

             POC-GLUCOSE METER 359 mg/dL           H            : Notified

 RN/MD:



             ([facility]) (test code =                                        TESTED

 AT [place] [address]



             [address])                                               [facility],



                                                                 [address]:



                                                                 /Techni

christina ID



                                                                 = [id number] for



                                                                 ALESSIA HUIZAR



POCT-GLUCOSE [id number] 21:13:00





             Test Item    Value        Reference Range Interpretation Comments

 

             POC-GLUCOSE METER 315 mg/dL           H            : TESTED A

T [facility] [address]



             (KUN) (test code =                                        [place] [facility],



             [address]38)                                               [address]:



                                                                 /Techni

christina ID



                                                                 = [id number] for Nicki Arciniega



POCT-GLUCOSE [id number] 21:13:00





             Test Item    Value        Reference Range Interpretation Comments

 

             POC-GLUCOSE METER 331 mg/dL           H            : TESTED A

T [facility] [address]



             (KUN) (test code =                                        [facility] [place] [facility],



             1538)                                               [address]:



                                                                 /Techni

christina ID



                                                                 = [id number] for RO

OSCAR KUO



Arterial doppler legs [id number] 14:52:35Ejection FractionSLEH ECHO 
HEARTLAB MKCKESSON CPACSRight Impression1. The common femoral, profunda femoral,
superficial femoral, popliteal,posterior tibial and anterior tibial arteries are
patent with normaltriphasic Doppler waveforms.2. The peroneal artery is not 
visualized.3. The PT and DP ROC's arenot obtained due to non-Dopplerable 
signals,swelling and inability to position the legs.4. The greattoe pressure and
TBI are not obtained due to size of the greattoe and swelling.5. The digits have
adequate flow by PPG waveforms.Left Impression1. The common femoral, profunda 
femoral, superficial femoral, popliteal,posterior tibial and anterior tibial 
arteries are patent with normaltriphasic Doppler waveforms.2. The peroneal 
artery is not visualized.3. The PT and DP ROC's are not obtained due to non-
Dopplerable signals,swelling and inability to position the legs.4. The great toe
pressure and TBI are not obtained due to size of the greattoe and swelling.5. 
The digits have adequate flow by PPG waveforms.  Conclusions  Summary  Arterial 
pressures and Doppler waveforms were performed bilaterally. Thearterial exam was
technically difficult due to edema, body habitus and inability to position the 
legs, however, adequate Doppler waveforms were obtained. The right and left 
arterial systems were patentwith triphasic Doppler waveforms and no evidence of 
obstruction. The peroneal artery was not visualized, bilaterally. The right and 
left ROC's were not obtained due to non-Dopplerable signals, swellingand 
inability to position the legs. The toe pressure and TBI's were not obtained due
to size of the great toe and swelling, bilaterally. The digits had adequate flow
by PPG waveforms bilaterally.  Signature  
---------------------------------------------------------------- Electronically 
signed by Radha Freedman MD(Interpreting physician) on 2020 02:52 PM 
---------------------------------------------------------------- Velocities are 
measured in cm/s ; Diameters are measured in cm LE Duplex Measurements          
                                                        Right                   
                     Left  
+--------------------------------------------------------------+ 
+------------------+-----------------+-------------------------+ 
+------------------+-----------------+-------------------------+ !Location  ! 
!PSV               !EDV              !Waveform                 ! !PSV           
   !EDV     !Waveform                 ! 
+--------------------------------------------------------------+ +--
----------------+-----------------+-------------------------+ 
+------------------+-----------------+-------------------------+ !Mid Common 
Femoral                                            ! !149         !             
   !Triphasic                ! !176               !                 !Triphasic  
             ! +--------------------------------------------------------------+ 
+------------------+-----------------+-------------------------+ 
+------------------+-----------------+-------------------------+ !Prox PFA      
                                               ! !69.2              !           
 !Triphasic                ! !81.3              !                 !Triphasic   !
+--------------------------------------------------------------+ 
+------------------+-----------------+-------------------------+ 
+------------------+-----------------+-------------------------+ !Prox SFA      
                                               ! !129               !           
     !Triphasic                ! !130               !                 !Triphasic
               ! +--------
------------------------------------------------------+ 
+------------------+-----------------+-------------------------+ 
+------------------+-----------------+-------------------------+ !Mid SFA       
                                       ! !125               !                 
!Triphasic           ! !89.8              !                 !Triphasic          
     ! +--------------------------------------------------------------+ 
+------------------+-----------------+-------------------------+ 
+------------------+-----------------+-------------------------+ !Dist SFA      
                          ! !96.6              !                 !Triphasic     
          ! !83.7              !                 !Triphasic                ! 
+--------------------------------------------------------------+ 
+------------------+-----------------+-------------------------+ +-------
-----------+-----------------+-------------------------+ !Prox Popliteal        
          ! !106               !                 !Triphasic                ! 
!96.6    !                 !Triphasic                ! 
+--------------------------------------------------------------+ 
+------------------+-----------------+-------------------------+ 
+------------------+-----------------+-------------------------+ !Dist Popliteal
    ! !81.9              !                 !Triphasic                ! !127     
         !        !Triphasic                ! 
+--------------------------------------------------------------+
+------------------+-----------------+-------------------------+ 
+------------------+-----------------+-------------------------+ !Prox PTA      
                                               ! !93.5            !             
   !Triphasic                ! !140               !                 !Triphasic  
             ! +--------------------------------------------------------------+ 
+------------------+-----------------+-------------------------+ 
+------------------+-----------------+-------------------------+ !Mid PTA       
                                               ! !83.1              !           
    !Triphasic                ! !64.4              !                 !Triphasic 
    ! +--------------------------------------------------------------+ 
+------------------+-----------------+-------------------------+ 
+------------------+-----------------+-------------------------+ !Dist PTA      
                                               ! !130               !  
!Triphasic                ! !109               !                 !Triphasic     
          ! +--------------------------------------------------------------+ 
+------------------+-----------------+-------------------------+ 
+------------------+-----------------+-------------------------+ !Prox MARY CARMEN      
                                           ! !125               !               
 !Triphasic              ! !92.9              !                 !Triphasic      
         ! +--------------------------------------------------------------+ 
+------------------+-----------------+-------------------------+ 
+------------------+-----------------+-------------------------+ !Mid MARY CARMEN       
                            ! !144               !                 !Triphasic! 
!61.1              !                 !Triphasic                ! 
+--------------------------------------------------------------+ 
+------------------+-----------------+-------------------------+ +----
--------------+-----------------+-------------------------+ !Dist MARY CARMEN           
          ! +--------------------------------------------------------------+ 
Interface, External Ris In - 2020  2:52 PM [facility] - Lower Extremity 
Arterial Duplex Demographics  Patient Name      JORDAN VERDIN    Date of Study 
     2020  MRN               [id number]          Age                 34  
Visit Number      [id number]        Gender              Male  Accession Number
[id number]          Date of Birth       1985  Referring         Randall Ramirez 
     Room Number     2211 Physician  Sonographer       Be Lynn  
Interpreting        Radha Freedman           S               Physician      
    MD ProcedureType of Study:  Extremities Arteries: Lower Extremities Arterial
Duplex, ARTERIAL DOPPLER LEGS, BILATERAL. Indications for Study:Heel ulcer.P
atient Status:Routine.Study Location:Vascular Lab.Technical Quality:Technically 
Difficult.Risk FactorsHistory of 
Disease+----------------+----------+--------------------------------------------

--+!Diagnosis       !Date      !Comments                                      
!+----------------+----------+--
--------------------------------------------+!History/Risk    
!2020!Current smoker, Morbidly obese, Spina bifida, !!Factors:        !   
      !Cerebral palsy, Pressure ulcer                !+----
------------+----------+----------------------------------------------+Impressio

nsRight Impression1.The common femoral, profunda femoral, superficial femoral, 
popliteal,posterior tibial and anterior tibial arteries are patent with 
normaltriphasic Doppler waveforms.2. The peroneal artery is not visualized.3. 
The PT and DP ROC's are not obtained due to non-Dopplerable signals,swelling and
inability toposition the legs.4. The great toe pressure and TBI are not obtained
due to size of the greattoe andswelling.5. The digits have adequate flow by PPG 
waveforms.Left Impression1. The common femoral, profunda femoral, superficial 
femoral, popliteal,posterior tibial and anterior tibial arteries are patent with
normaltriphasic Doppler waveforms.2. The peroneal artery is not visualized.3. 
The PT and DP ROC's are not obtained due to non-Dopplerable signals,swelling and
inability to position the legs.4. The great toe pressure and TBI are not 
obtained due to size of the greattoe and swelling.5. The digits have adequate 
flow by PPG waveforms. Conclusions  Summary  Arterial pressures and Doppler 
waveforms were performed bilaterally. The arterial exam was technically 
difficult due to edema, body habitus andinability to position the legs, however,
adequate Doppler waveforms were obtained. The right and left arterial systems 
were patent with triphasic Doppler waveforms and no evidence of obstruction. The
peroneal artery was not visualized, bilaterally. The right and left ROC's were 
not obtained due to non-Dopplerable signals, swelling and inability to position 
the legs. The toe pressure and TBI's were not obtained due to size of the great 
toe and swelling, bilaterally. The digits had adequate flow by PPG waveforms 
bilaterally.  Signature  
----------------------------------------------------------------Electronically 
signed by Radha Freedman MD(Cas physician) on 2020 02:52 PM 
---------------------------------------------------------------- Velocities are 
measured in cm/s ; Diameters are measured in cmLE Duplex Measurements    Right  
                                                         Left  
+--------------------------------------------------------------+ 
+------------------+-----------------+-------------------------+ 
+------------------+-----------------+-------------------------+ !Location      
                       ! !PSV               !EDV              !Waveform         
       ! !PSV               !EDV              !Waveform                 ! 
+--------------------------------------------------------------+ 
+------------------+-----------------+-------------------------+ +----------
--------+-----------------+-------------------------+ !Mid Common Femoral       
        ! !149               !                 !Triphasic                ! !176 
!                 !Triphasic                ! 
+--------------------------------------------------------------+ 
+------------------+-----------------+-------------------------+ 
+------------------+-----------------+-------------------------+ !Prox PFA  ! 
!69.2              !                 !Triphasic                ! !81.3          
   !     !Triphasic                ! 
+--------------------------------------------------------------+ +--
----------------+-----------------+-------------------------+ 
+------------------+-----------------+-------------------------+ !Prox SFA      
                                               ! !129         !                 
!Triphasic                ! !130               !                 !Triphasic     
          ! +--------------------------------------------------------------+ 
+------------------+-----------------+-------------------------+ 
+------------------+-----------------+-------------------------+ !Mid SFA       
                                               ! !125               !           
 !Triphasic                ! !89.8              !                 !Triphasic   !
+--------------------------------------------------------------+ 
+------------------+-----------------+-------------------------+ 
+------------------+-----------------+-------------------------+ !Dist SFA      
                                               ! !96.6              !           
     !Triphasic                ! !83.7              !                 !Triphasic
               ! +--------
------------------------------------------------------+ 
+------------------+-----------------+-------------------------+ 
+------------------+-----------------+-------------------------+ !Prox Popliteal
                                              ! !106               !            
    !Triphasic           ! !96.6              !                 !Triphasic      
         ! +--------------------------------------------------------------+ 
+------------------+-----------------+-------------------------+ 
+------------------+-----------------+-------------------------+ !Dist Popliteal
                                ! !81.9              !                 
!Triphasic                ! !127               !                 !Triphasic     
          ! +--------------------------------------------------------------+ 
+------------------+-----------------+-------------------------+ +-------
-----------+-----------------+-------------------------+ !Prox PTA              
    ! !93.5              !                 !Triphasic                ! !140    !
                !Triphasic                ! 
+--------------------------------------------------------------+ 
+------------------+-----------------+-------------------------+ 
+------------------+-----------------+-------------------------+ !Mid PTA     ! 
!83.1              !                 !Triphasic                ! !64.4          
   !        !Triphasic                ! 
+--------------------------------------------------------------+
+------------------+-----------------+-------------------------+ 
+------------------+-----------------+-------------------------+ !Dist PTA      
                                               ! !130            !              
  !Triphasic                ! !109               !                 !Triphasic   
            ! +--------------------------------------------------------------+ 
+------------------+-----------------+-------------------------+ 
+------------------+-----------------+-------------------------+ !Prox MARY CARMEN      
                                               ! !125               !           
    !Triphasic                ! !92.9              !                 !Triphasic 
    ! +--------------------------------------------------------------+ 
+------------------+-----------------+-------------------------+ 
+------------------+-----------------+-------------------------+ !Mid MARY CARMEN       
                                               ! !144               !  
!Triphasic                ! !61.1              !                 !Triphasic     
          ! +--------------------------------------------------------------+ 
+------------------+-----------------+-------------------------+ 
+------------------+-----------------+-------------------------+ !Dist MARY CARMEN      
                                           ! 
+--------------------------------------------------------------+[facility] [id number] 10:30:00





             Test Item    Value        Reference Range Interpretation Comments

 

             POC-GLUCOSE METER 341 mg/dL           H            : TESTED A

T [facility] [address]



             (BEAKER) (test code =                                        [place],



             1538)                                               [id number]:



                                                                 /Techni

christina ID



                                                                 = [id number] for Sm

ith,



                                                                 Nicki



Manual [id number] 08:48:00





             Test Item    Value        Reference Range Interpretation Comments

 

             % Neutros (manual) (test code = 69 %                               

    



             1359)                                               

 

             % Lymphs (manual) (test code = 1360) 25 %                          

         

 

             % Monos (manual) (test code = 1361) 3 %                            

        

 

             % Eos (manual) (test code = 1362) 3 %                              

      

 

             % Baso (manual) (test code = 1363) 0 %                             

       

 

             # Neutros (manual) (test code = 4.62         1.80- 8.00 K/L      

        



             1365)                                               

 

             # Lymphs (manual) (test code = 1366) 1.68         1.48- 4.50 K/L 

             

 

             # Monos (manual) (test code = 1367) 0.20         0.00- 1.30 K/L  

            

 

             # Eos (manual) (test code = 1368) 0.20         0.00- 0.50 K/L    

          

 

             # Baso (manual) (test code = 1369) 0.00         0.00- 0.20 K/L   

           

 

             Total Counted (test code = 1351) 100                               

     

 

             Platelet Morphology (test code = Normal                            

     



             486)                                                

 

             RBC Morphology (test code = 762) Normal                            

     

 

             Smudge Cells (test code = 1371) Present                            

    



[facility] [id number] 
08:48:00





             Test Item    Value        Reference Range Interpretation Comments

 

             WHITE BLOOD CELL COUNT (BEAKER) 6.7 K/ L     3.5-10.5              

    



             (test code = 775)                                        

 

             RED BLOOD CELL COUNT (BEAKER) 5.28 M/ L    4.63-6.08               

  



             (test code = 761)                                        

 

             HEMOGLOBIN (BEAKER) (test code = 14.6 GM/DL   13.7-17.5            

     



             410)                                                

 

             HEMATOCRIT (BEAKER) (test code = 44.9 %       40.1-51.0            

     



             411)                                                

 

             MEAN CORPUSCULAR VOLUME (BEAKER) 85.0 fL      79.0-92.2            

     



             (test code = 753)                                        

 

             MEAN CORPUSCULAR HEMOGLOBIN 27.7 pg      25.7-32.2                 



             (BEAKER) (test code = 751)                                        

 

             MEAN CORPUSCULAR HEMOGLOBIN CONC 32.5 GM/DL   32.3-36.5            

     



             (BEAKER) (test code = 752)                                        

 

             RED CELL DISTRIBUTION WIDTH 15.3 %       11.6-14.4    H            



             (BEAKER) (test code = 412)                                        

 

             PLATELET COUNT (BEAKER) (test 300 K/CU MM  150-450                 

  



             code = 756)                                         

 

             MEAN PLATELET VOLUME (BEAKER) 10.4 fL      9.4-12.4                

  



             (test code = 754)                                        

 

             NUCLEATED RED BLOOD CELLS 0 /100 WBC   0-0                       



             (BEAKER) (test code = 413)                                        



(MANUAL DIFFERENTIAL)2020 08:48:00





             Test Item    Value        Reference Range Interpretation Comments

 

             NEUTROPHILS - REL (DIFF) (BEAKER) 69 %                             

      



             (test code = 1359)                                        

 

             LYMPHOCYTES - REL (DIFF) (BEAKER) 25 %                             

      



             (test code = 1360)                                        

 

             MONOCYTES - REL (DIFF) (BEAKER) 3 %                                

    



             (test code = 1361)                                        

 

             EOSINOPHILS - REL (DIFF) (BEAKER) 3 %                              

      



             (test code = 1362)                                        

 

             BASOPHILS - REL (DIFF) (BEAKER) 0 %                                

    



             (test code = 1363)                                        

 

             NEUTROPHILS - ABS (DIFF) (BEAKER) 4.62 K/ L    1.80-8.00           

      



             (test code = 1365)                                        

 

             LYMPHOCYTES - ABS (DIFF) (BEAKER) 1.68 K/ L    1.48-4.50           

      



             (test code = 1366)                                        

 

             MONOCYTES - ABS (DIFF) (BEAKER) 0.20 K/ L    0.00-1.30             

    



             (test code = 1367)                                        

 

             EOSINOPHILS - ABS (DIFF) (BEAKER) 0.20 K/ L    0.00-0.50           

      



             (test code = 1368)                                        

 

             BASOPHILS - ABS (DIFF) (BEAKER) 0.00 K/ L    0.00-0.20             

    



             (test code = 1369)                                        

 

             TOTAL COUNTED (BEAKER) (test code = 100                            

        



             1351)                                               

 

             PLT MORPHOLOGY (BEAKER) (test code Normal                          

       



             = 486)                                              

 

             RBC MORPHOLOGY (BEAKER) (test code Normal                          

       



             = 762)                                              

 

             SMUDGE CELLS (BEAKER) (test code = Present                         

       



             1371)                                               



HEMOGLOBIN [id number] 06:39:00





             Test Item    Value        Reference Range Interpretation Comments

 

             HEMOGLOBIN A1C (BEAKER) (test code = 10.5 %       4.3-6.1      H   

         



             368)                                                



LIPID [id number] 04:57:00





             Test Item    Value        Reference Range Interpretation Comments

 

             TRIGLYCERIDES (BEAKER) 1251 mg/dL                             Speci

men moderately



             (test code = 540)                                        hemolyzed

 

             CHOLESTEROL (BEAKER) 215 mg/dL                              Specime

n moderately



             (test code = 631)                                        hemolyzed

 

             HDL CHOLESTEROL 22 mg/dL                               



             (BEAKER) (test code =                                        



             976)                                                



Calculated LDL not valid if triglyceride &gt;400 mg/dLTriglyceride Reference 
Range:   Low Risk  &lt;150   Borderline    150-199   High Risk     200-499   
Very High Risk  &gt;=500Cholesterol Reference Range:   Low Risk         &lt;200 
 Borderline    200-239    High Risk        &gt;240HDL Cholesterol Reference 
Range:   Low Risk         &gt;=60   High Risk         &lt;40LDL Cholesterol 
ReferenceRange:   Optimal          &lt;100   Near Optimal  100-129   Borderline 
  130-159   High          160-189   Very High       &gt;=190   Specimen 
moderately lipemicBASIC METABOLIC [id number] 04:56:00





             Test Item    Value        Reference Range Interpretation Comments

 

             SODIUM (BEAKER) 137 meq/L    136-145                   



             (test code = 381)                                        

 

             POTASSIUM (BEAKER) 4.6 meq/L    3.5-5.1                   Specimen 

moderately



             (test code = 379)                                        hemolyzed

 

             CHLORIDE (BEAKER) 106 meq/L                        



             (test code = 382)                                        

 

             CO2 (BEAKER) (test 20 meq/L     22-29        L            



             code = 355)                                         

 

             BLOOD UREA NITROGEN 12 mg/dL     7-21                      



             (BEAKER) (test code                                        



             = 354)                                              

 

             CREATININE (BEAKER) 0.95 mg/dL   0.57-1.25                 Specimen

 moderately



             (test code = 358)                                        hemolyzed

 

             GLUCOSE RANDOM 398 mg/dL           H            



             (BEAKER) (test code                                        



             = 652)                                              

 

             CALCIUM (BEAKER) 8.8 mg/dL    8.4-10.2                  



             (test code = 697)                                        

 

             EGFR (BEAKER) (test 110 mL/min/1.73                           ESTIM

ATED GFR IS



             code = 1092) sq m                                   NOT AS ACCURATE

 AS



                                                                 CREATININE



                                                                 CLEARANCE IN



                                                                 PREDICTING



                                                                 GLOMERULAR



                                                                 FILTRATION RATE

.



                                                                 ESTIMATED GFR I

S



                                                                 NOT APPLICABLE 

FOR



                                                                 DIALYSIS PATIEN

TS.



POCT-GLUCOSE [id number] 21:53:00





             Test Item    Value        Reference Range Interpretation Comments

 

             POC-GLUCOSE METER > mg/dL             HH           : Notified

 RN/MD: TESTED



             (KUN) (test code =                                        AT [facility] [address] [facility]



             [address])                                               [facility]

30:



                                                                 /Techni

christina ID =



                                                                 [id number] for ZECHARIAH TRACY



U/S, ABDOMINAL, [id number] 19:54:00Reason for exam:-&gt;Evaluation of  
shunt and for possible cyst or pseudocyst Should this be performed at the 
bedside?-&gt;YesFINAL REPORT PATIENT ID:   [id number] Limited abdominal 
ultrasound. CLINICAL HISTORY: Evaluation of VPshunt for possible cyst or 
pseudocyst. COMPARISON STUDY: None available. FINDINGS: Sonographic assessment 
of the abdomen was performed assessing for fluid in the region of the patient's 
shunts. No fluid collections are seen. However, the study is limited by the 
patient's body habitus. CT scan would bemore sensitive. Signed: Madison Hendrickson 
MDReport Verified Date/Time:  2020 19:54:10 Reading Location: [address]
Consult Reading Room      Electronically signed by: MADISON HENDRICKSON M.D. on 
2020 07:54 PMUS abdomen [id number] 19:54:00Interface, External Ris 
In - 2020  7:57 PM CSTFINAL REPORT PATIENT ID:   [id number] Limited abdom
inal ultrasound. CLINICAL HISTORY: Evaluation of  shunt for possible cyst or 
pseudocyst. COMPARISON STUDY: None available. FINDINGS: Sonographic assessment 
of the abdomen was performed assessing for fluid in the region of the patient's 
shunts. No fluid collections are seen. However, the study is limited by the 
patient's body habitus. CT scan would be more sensitive. Signed: Madison Hendrickson
MDReportVerified Date/Time:  2020 19:54:10 Reading Location: [facility] C013W 
Consult Reading Room      Electronically signed by: MADISON HENDRICKSON M.D. on 
2020 07:54 [facility] [id number] 
19:34:00





             Test Item    Value        Reference Range Interpretation Comments

 

             POC-GLUCOSE METER 474 mg/dL           HH           : Notified

 RN/MD:



             (KUN) (test code =                                        TESTED

 AT [facility] [address] 1650)                                               [facility],



                                                                 [id number]:



                                                                 /Techni

christina ID



                                                                 = [id number] for LONA URIOSTEGUI



Urinalysis w/Microscopic + Reflex to [id number] 17:48:00





             Test Item    Value        Reference Range Interpretation Comments

 

             Color, UA (test code = 5778-6) Yellow                              

   

 

             Clarity, UA (test code = 5767-9) Hazy                              

     

 

             Specific Gravity, UA (test code = 1.020        1.001-1.035         

      



             5811-5)                                             

 

             pH, UA (test code = 5803-2) 6.0          5.0-8.0                   

 

             Protein, UA (test code = 09390-0) 20 mg/dL     Negative     A      

      

 

             Glucose, UA (test code = 365) >1000 mg/dL  Negative     A          

  

 

             Ketones, UA (test code = 2514-8) 40 mg/dL     Negative     A       

     

 

             Bilirubin, UA (test code = Negative     Negative                  



             59773-2)                                            

 

             Blood, UA (test code = 51360-1) Moderate     Negative     A        

    

 

             Nitrite, UA (test code = 5802-4) Positive     Negative     A       

     

 

             Leukocytes, UA (test code = Large        Negative     A            



             5799-2)                                             

 

             Urobilinogen, UA (test code = 0.2 mg/dL    0.2-1                   

  



             35740-1)                                            

 

             RBC, UA (test code = 41596-7) 0            /HPF                    

  

 

             WBC, UA (test code = 5821-4) 267          /HPF                     

 

 

             Bacteria, UA (test code = Moderate                               



             23326-2)                                            

 

             Specimen Source (test code =                                       

 



             2795)                                               

 

             Lab Interpretation (test code = Abnormal                           

    



             98348-8)                                            



[facility] URINE [id number] 
17:48:00





             Test Item    Value        Reference Range Interpretation Comments

 

             COLOR (BEAKER) (test code = 470) Yellow                            

     

 

             CLARITY (BEAKER) (test code = Hazy                                 

  



             469)                                                

 

             SPECIFIC GRAVITY UA (BEAKER) 1.020        1.001-1.035              

 



             (test code = 468)                                        

 

             PH UA (BEAKER) (test code = 467) 6.0          5.0-8.0              

     

 

             PROTEIN UA (BEAKER) (test code = 20 mg/dL     Negative     A       

     



             464)                                                

 

             GLUCOSE UA (BEAKER) (test code = >1000 mg/dL  Negative     A       

     



             365)                                                

 

             KETONES UA (BEAKER) (test code = 40 mg/dL     Negative     A       

     



             371)                                                

 

             BILIRUBIN UA (BEAKER) (test code Negative     Negative             

     



             = 462)                                              

 

             BLOOD UA (BEAKER) (test code = Moderate     Negative     A         

   



             461)                                                

 

             NITRITE UA (BEAKER) (test code = Positive     Negative     A       

     



             465)                                                

 

             LEUKOCYTE ESTERASE UA (BEAKER) Large        Negative     A         

   



             (test code = 466)                                        

 

             UROBILINOGEN UA (BEAKER) (test 0.2 mg/dL    0.2-1.0                

   



             code = 463)                                         

 

             RBC UA (BEAKER) (test code = 519) 0 /HPF                           

      

 

             WBC UA (BEAKER) (test code = 520) 267 /HPF                         

      

 

             BACTERIA (BEAKER) (test code = Moderate                            

   



             517)                                                

 

             SOURCE(BEAKER) (test code = 2795)                                  

      



Manual [id number] 17:38:00





             Test Item    Value        Reference Range Interpretation Comments

 

             % Neutros (test code = 63 %                                   



             2816)                                               

 

             % Lymphs (test code = 23 %                                   



             2817)                                               

 

             % Monos (test code = 6 %                                    



             2818)                                               

 

             % Eos (test code = 2819) 5 %                                    

 

             % Baso (test code = 2820) 1 %                                    

 

             % Bands (test code = 2 %          0-10                      



             2826)                                               

 

             # Neutros (test code = 4.91 K/ul    1.78-5.38                 



             2830)                                               

 

             # Lymphs (test code = 1.79 K/ul    1.32-3.57                 



             2831)                                               

 

             # Monos (test code = 0.47 K/uL    0.3-0.82                  



             2832)                                               

 

             # Eos (test code = 2834) 0.39 K/uL    0.04-0.54                 

 

             # Baso (test code = 2835) 0.08 K/uL    0.01-0.08                 

 

             # Bands (test code = 0.16 K/uL    0-0.8                     



             2840)                                               

 

             Total Counted (test code 100                                    



             = 1351)                                             

 

             Smudge Cells (test code = Present                                



             1371)                                               

 

             Giant Platelet (test code Present                                



             = 313)                                              

 

             Anisocytosis (test code = 1+ few                                 



             961)                                                

 

             Poikilocytes (test code = 2+ moderate                            



             966)                                                

 

             Spherocytes (test code = 1+ few                                 



             768)                                                

 

             Ovalocytes (test code = 1+ few                                 



             477)                                                

 

             Tear Drop Cells (test 1+ few                                 



             code = 481)                                         

 

             Artifact (test code = Present                                



             3432)                                               

 

             Platelet Conc (test code Adequate                               



             = 3438)                                             

 

             MAL (test code = MAL) Received comment:                           



                          User comments: Slide                           



                          comments:                              



[facility] W/PLT COUNT &amp; AUTO [id number] 
17:38:00





             Test Item    Value        Reference Range Interpretation Comments

 

             WHITE BLOOD CELL COUNT (BEAKER) 7.8 K/ L     3.5-10.5              

    



             (test code = 775)                                        

 

             RED BLOOD CELL COUNT (BEAKER) 5.12 M/ L    4.63-6.08               

  



             (test code = 761)                                        

 

             HEMOGLOBIN (BEAKER) (test code = 14.7 GM/DL   13.7-17.5            

     



             410)                                                

 

             HEMATOCRIT (BEAKER) (test code = 43.3 %       40.1-51.0            

     



             411)                                                

 

             MEAN CORPUSCULAR VOLUME (BEAKER) 84.6 fL      79.0-92.2            

     



             (test code = 753)                                        

 

             MEAN CORPUSCULAR HEMOGLOBIN 28.7 pg      25.7-32.2                 



             (BEAKER) (test code = 751)                                        

 

             MEAN CORPUSCULAR HEMOGLOBIN CONC 33.9 GM/DL   32.3-36.5            

     



             (BEAKER) (test code = 752)                                        

 

             RED CELL DISTRIBUTION WIDTH 15.3 %       11.6-14.4    H            



             (BEAKER) (test code = 412)                                        

 

             PLATELET COUNT (BEAKER) (test 344 K/CU MM  150-450                 

  



             code = 756)                                         

 

             MEAN PLATELET VOLUME (BEAKER) 11.0 fL      9.4-12.4                

  



             (test code = 754)                                        

 

             NUCLEATED RED BLOOD CELLS 0 /100 WBC   0-0                       



             (BEAKER) (test code = 413)                                        



(CELLAVISION MANUAL DIFF)2020 17:38:00





             Test Item    Value        Reference Range Interpretation Comments

 

             NEUTROPHILS - REL 63 %                                   



             (CELLAVISION)(BEAKER) (test code                                   

     



             = 2816)                                             

 

             LYMPHOCYTES - REL 23 %                                   



             (CELLAVISION)(BEAKER) (test code                                   

     



             = 2817)                                             

 

             MONOCYTES - REL 6 %                                    



             (CELLAVISION)(BEAKER) (test code                                   

     



             = 2818)                                             

 

             EOSINOPHILS - REL 5 %                                    



             (CELLAVISION)(BEAKER) (test code                                   

     



             = 2819)                                             

 

             BASOPHILS - REL 1 %                                    



             (CELLAVISION)(BEAKER) (test code                                   

     



             = 2820)                                             

 

             BANDS - REL (CELLAVISION)(BEAKER) 2 %          0-10                

      



             (test code = 2826)                                        

 

             NEUTROPHILS - ABS 4.91 K/ul    1.78-5.38                 



             (CELLAVISION)(BEAKER) (test code                                   

     



             = 2830)                                             

 

             LYMPHOCYTES - ABS 1.79 K/ul    1.32-3.57                 



             (CELLAVISION)(BEAKER) (test code                                   

     



             = 2831)                                             

 

             MONOCYTES - ABS 0.47 K/uL    0.30-0.82                 



             (CELLAVISION)(BEAKER) (test code                                   

     



             = 2832)                                             

 

             EOSINOPHILS - ABS 0.39 K/uL    0.04-0.54                 



             (CELLAVISION)(BEAKER) (test code                                   

     



             = 2834)                                             

 

             BASOPHILS - ABS 0.08 K/uL    0.01-0.08                 



             (CELLAVISION)(BEAKER) (test code                                   

     



             = 2835)                                             

 

             BANDS - ABS (CELLAVISION)(BEAKER) 0.16 K/uL    0.00-0.80           

      



             (test code = 2840)                                        

 

             TOTAL COUNTED (BEAKER) (test code 100                              

      



             = 1351)                                             

 

             SMUDGE CELLS (BEAKER) (test code Present                           

     



             = 1371)                                             

 

             GIANT PLATELETS (BEAKER) (test Present                             

   



             code = 313)                                         

 

             ANISOCYTOSIS (BEAKER) (test code 1+ few                            

     



             = 961)                                              

 

             POIKILOCYTES (BEAKER) (test code 2+ moderate                       

     



             = 966)                                              

 

             SPHEROCYTES (BEAKER) (test code = 1+ few                           

      



             768)                                                

 

             OVALOCYTES (BEAKER) (test code = 1+ few                            

     



             477)                                                

 

             TEAR DROP CELLS (BEAKER) (test 1+ few                              

   



             code = 481)                                         

 

             ARTIFACT (CELLAVISION)(BEAKER) Present                             

   



             (test code = 3432)                                        

 

             PLATELET CONCENTRATION Adequate                               



             (CELLAVISION)(BEAKER) (test code                                   

     



             = 3438)                                             



Received comment: User comments: Slide comments:POCT-GLUCOSE [id number] 
16:27:00





             Test Item    Value        Reference Range Interpretation Comments

 

             POC-GLUCOSE METER 424 mg/dL           HH           : Notified

 RN/MD:



             (KUN) (test code =                                        TESTED

 AT [facility] [address]



             1538)                                               [place] [facility],



                                                                 [id number]:



                                                                 /Techni

christina ID



                                                                 = [id number] for AK

LONA DUNNE



CT, BRAIN, WITHOUT [id number] 15:31:00FINAL REPORT PATIENT ID:   
[id number] CT, BRAIN, WITHOUT CONTRAST CLINICAL INDICATION:  Hydrocephalus C
OMPARISON: None TECHNIQUE:  Noncontrast axial CT imaging of the brain and skull.
 DOSE REDUCTION: Dose modulation, iterative reconstruction, and/or weight-based 
adjustment of the mA/kV was utilized to reduce the radiation dose to as low as 
reasonably achievable. FINDINGS:A total of two ventricular drainage catheters 
are present. A right transverse temporal catheter terminates across the midline 
just above the level of the foramen of Monro. A right parietal approach catheter
terminates in the pineal region. Supratentorial ventricular configuration is 
slitlike. There is no herniation. The basilar cisterns are preserved. There is 
no identifiable recent infarct. 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 Calvarial configuration escape of 
cephalic. Thereis no acute osseous abnormality.  IMPRESSION: Chronic, likely 
congenital parenchymal changes withoutrecent infarct or hemorrhage. A total of 
two ventricular drainage catheters are present. Supratentorial ventricular 
configuration is slitlike and may represent over shunting. Correlation 
recommended. Signed: JR Rae [facility] Verified Date/Time:  
2020 15:31:08 Reading Location: [facility] B1 C013V Neuro Reading Room    
Electronically signed by: ALONDRA RAE on 2020 03:31 PMCT brain 
without IV [id number] 15:31:00Interface, External Ris In - 2020  
3:33 PM CSTFINAL REPORT PATIENT ID:   [id number] CT, BRAIN, WITHOUT CONTRAST 
CLINICAL INDICATION:  Hydrocephalus COMPARISON: None TECHNIQUE:  Noncontrast 
axial CTimaging of the brain and skull.  DOSE REDUCTION: Dose modulation, 
iterative reconstruction, and/or weight-based adjustment of the mA/kV was 
utilized to reduce the radiation dose to as low as reasonablyachievable. 
FINDINGS:A total of two ventricular drainage catheters are present. A right 
transverse temporal catheter terminates across the midline just above the level 
of the foramen of Monro. A right parietal approach catheter terminates in the 
pineal region. Supratentorial ventricular configuration is slitlike. There is no
herniation. The basilar cisterns are preserved. There is no identifiable recent 
infarct. 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 partial
callosal agenesis. Calvarial configuration escape of cephalic. There is no acute
osseous abnormality.  IMPRESSION: Chronic, likely congenital parenchymal changes
without recent infarct or hemorrhage. A total of two ventricular drainage 
catheters are present. Supratentorial ventricular configuration is slitlike and 
mayrepresent over shunting. Correlation recommended. Signed: JR Rae Robert MDReport VerifiedDate/Time:  2020 15:31:08 Reading Location: [address] Neuro Reading Room    Electronically signed by: ALONDRA RAE on 
2020 03:31 [facility] [id number] 
15:13:00Reason for exam:-&gt;concern for VPS malfunctionFINAL REPORT PATIENT ID:
  [id number] RAD, SHUNT SERIES INDICATION: concern for VPS malfunction COMPARISON:
None TECHNIQUE: AP and lateral radiographs of the skull, abdomen and chest were 
acquired to evaluate shunt catheter FINDINGS:An abandoned shunt catheter is 
present within the right neck. Medial tothis a second shunt catheter is present 
which descends along the left chest wall, terminating withinthe mid pelvis. 
Radiopaque portions of the catheter appear contiguous. Signed: Lisa Tan Verified Date/Time:  2020 15:13:54 Reading Location: [facility] Radiology Reading Room  Electronically signed by: LISA TAN MD on 2020 03:13 PMXR shunt [id number] 15:13:00Interface, External 
Ris In - 2020  3:16 PM CSTFINAL REPORT PATIENT ID:   [id number] RAD, SHUNT 
SERIES INDICATION: concern for VPS malfunction COMPARISON: None TECHNIQUE: AP 
and lateral radiographs of the skull, abdomen and chest were acquired to 
evaluate shunt catheter FINDINGS:An abandoned shunt catheter is present within 
the right neck. Medial to this a second shunt catheter is present which descends
along the left chest wall, terminating within the mid pelvis. Radiopaque 
portions of the catheter appear contiguous. Signed: Lisa Tanort 
Verified Date/Time:  2020 15:13:54 Reading Location: [facility] 
Radiology Reading Room  Electronically signed by: LISA TAN MD 
on 2020 03:13 [facility] [id number] 
11:38:00





             Test Item    Value        Reference Range Interpretation Comments

 

             POC-GLUCOSE METER 394 mg/dL           H            : TESTED A

T [facility] [address]



             [facility] ([facility] code =                                        MILY NELSON [facility],



             1538)                                               [id number]:



                                                                 /Techni

christina ID



                                                                 = [id number] for LONA URIOSTEGUI



HEMOGLOBIN [id number] 09:15:00





             Test Item    Value        Reference Range Interpretation Comments

 

             [facility]GLOBIN [facility]C [facility] (test code = 10.4 %       4.3-6.1      H   

         



             368)                                                



TSH/Free T4 If [id number] 07:54:00





             Test Item    Value        Reference Range Interpretation Comments

 

             TSH (test code = 16635-3) 1.40         0.35- 4.94 uIU/mL           

   

 

             Lab Interpretation (test code = Normal                             

    



             98373-6)                                            



[facility] Center[facility] T4 IF [id number] 07:54:00





             Test Item    Value        Reference Range Interpretation Comments

 

             THYROID STIMULATING HORMONE 1.40 uIU/mL  0.35-4.94                 



             ([facility] (test code = 772)                                        



POCT-GLUCOSE [id number] 07:48:00





             Test Item    Value        Reference Range Interpretation Comments

 

             POC-GLUCOSE METER > mg/dL             HH           : Notified

 RN/MD: TESTED



             (BEAKER) (test code =                                        AT [facility] [address] [facility]



             1538)                                               [facility], [address]

30:



                                                                 /Techni

christina ID =



                                                                 [id number] for LONA GOLDSMITH



BASIC METABOLIC [id number] 07:44:00





             Test Item    Value        Reference Range Interpretation Comments

 

             SODIUM (BEAKER) 134 meq/L    136-145      L            



             (test code = 381)                                        

 

             POTASSIUM (BEAKER) 4.4 meq/L    3.5-5.1                   



             (test code = 379)                                        

 

             CHLORIDE (BEAKER) 103 meq/L                        



             (test code = 382)                                        

 

             CO2 (BEAKER) (test 20 meq/L     22-29        L            



             code = 355)                                         

 

             BLOOD UREA NITROGEN 17 mg/dL     7-21                      



             (BEAKER) (test code                                        



             = 354)                                              

 

             CREATININE (BEAKER) 1.09 mg/dL   0.57-1.25                 



             (test code = 358)                                        

 

             GLUCOSE RANDOM 464 mg/dL           HH           



             (BEAKER) (test code                                        



             = 652)                                              

 

             CALCIUM (BEAKER) 8.6 mg/dL    8.4-10.2                  



             (test code = 697)                                        

 

             EGFR (BEAKER) (test 94 mL/min/1.73                           ESTIMA

HUMA GFR IS



             code = 1092) sq m                                   NOT AS ACCURATE

 AS



                                                                 CREATININE



                                                                 CLEARANCE IN



                                                                 PREDICTING



                                                                 GLOMERULAR



                                                                 FILTRATION RATE

.



                                                                 ESTIMATED GFR I

S



                                                                 NOT APPLICABLE 

FOR



                                                                 DIALYSIS PATIEN

TS.



LIPID [id number] 07:34:00





             Test Item    Value        Reference Range Interpretation Comments

 

             TRIGLYCERIDES (BEAKER) (test code = 750 mg/dL                      

        



             540)                                                

 

             CHOLESTEROL (BEAKER) (test code = 196 mg/dL                        

      



             631)                                                

 

             HDL CHOLESTEROL (BEAKER) (test code 22 mg/dL                       

        



             = 976)                                              



Calculated LDL not valid if triglyceride &gt;400 mg/dLTriglyceride Reference 
Range:   Low Risk  &lt;150   Borderline    150-199   High Risk     200-499   
Very High Risk  &gt;=500Cholesterol Reference Range:   Low Risk         &lt;200 
 Borderline    200-239    High Risk        &gt;240HDL Cholesterol Reference 
Range:   Low Risk         &gt;=60   High Risk         &lt;40LDL Cholesterol 
ReferenceRange:   Optimal          &lt;100   Near Optimal  100-129   Borderline 
  130-159   High          160-189   Very High       &gt;=190POCT-GLUCOSE METER
2020 00:49:00





             Test Item    Value        Reference Range Interpretation Comments

 

             POC-GLUCOSE METER 399 mg/dL           H            : TESTED A

T [facility] [address]



             (BEAKER) (test code =                                        [place],



             1538)                                               [address]:



                                                                 /Techni

christina ID



                                                                 = [id number] for MALLIKA

SHARONDACLAY WILSON



RAD, FOOT, 2 VIEWS, [id number] 22:28:00Reason for exam:-&gt;osteomyletitis
FINAL REPORT PATIENT ID:   [id number] TECHNIQUE: Two views each of the bilateral 
feet HISTORY: osteomyletitis. COMPARISON: None. IMPRESSION:No acute displaced 
fracture or dislocation. Joint spaces are within normal limits.Severe soft 
tissue swelling.On the right subcentimeter nonspecific calcifications adjacent 
to the heel plantar aspect. Correlate with physical exam. Severely limited exam 
due to patient body habitus. No definite cortical irregularity.There is clinical
concern for osteomyelitis, recommend MRI with contrast. Signed: Sriram Townsendort Verified Date/Time:  2020 22:28:25 Reading Location: [address]
Consult Reading Room  Electronically signed by: SRIRAM TOWNSEND DO on 
2020 10:28 PMRAD, FOOT, 2 VIEWS, [id number] 22:28:00Reason for 
exam:-&gt;osteomyletitsFINAL REPORT PATIENT ID:   [id number] TECHNIQUE: Two views 
each of the bilateral feet HISTORY: osteomyletitis. COMPARISON: None. 
IMPRESSION:No acute displaced fracture or dislocation. Joint spaces are within 
normal limits.Severe soft tissue swelling.On the right subcentimeter nonspecific
calcifications adjacent to the heel plantar aspect. Correlate with physical 
exam. Severely limited exam due to patient body habitus. No definite cortical 
irregularity.There is clinical concern for osteomyelitis, recommend MRI with 
contrast. Signed: Sriram Townsend Verified Date/Time:  2020 
22:28:25 Reading Location: [address] Consult Reading Room  Electronically 
signed by: SRIRAM TOWNSEND DO on 2020 10:28 PMXR foot 2 views 
[id number] 22:28:00Interface, External Ris In - 2020 10:30 PM CSTFINAL
REPORT PATIENT ID:   [id number] TECHNIQUE: Two views each of the bilateral feet 
HISTORY: osteomyletitis. COMPARISON: None. IMPRESSION:No acute displaced 
fracture or dislocation. Joint spaces are within normal limits.Severe soft 
tissue swelling.Onthe right subcentimeter nonspecific calcifications adjacent to
the heel plantar aspect. Correlate with physical exam. Severely limited exam due
to patient body habitus. No definite cortical irregularity.There is clinical 
concern for osteomyelitis, recommend MRI with contrast. Signed: Sriram Townsendort Verified Date/Time:  2020 22:28:25 Reading Location: [address]
Consult Reading Room  Electronically signed by: SRIRAM TOWNSEND DO on 
2020 10:28 [facility] [id number] 
22:28:00Interface, External Ris In - 2020 10:30 PM CSTFINAL REPORT PATIENT
ID:   [id number] TECHNIQUE: Two views each of the bilateral feet HISTORY: 
osteomyletitis. COMPARISON: None. IMPRESSION:No acute displaced fracture or 
dislocation. Joint spaces are within normal limits.Severe soft tissue 
swelling.Onthe right subcentimeter nonspecific calcifications adjacent to the 
heel plantar aspect. Correlate with physical exam. Severely limited exam due to 
patient body habitus. No definite cortical irregularity.There is clinical 
concern for osteomyelitis, recommend MRI with contrast. Signed: Sriram Townsend 
MDReport Verified Date/Time:  2020 22:28:25 Reading Location: [address]
Consult Reading Room  Electronically signed by: SRIRAM TOWNSEND DO on 
2020 10:28 [facility] [id number] 
21:04:00





             Test Item    Value        Reference Range Interpretation Comments

 

             POC-GLUCOSE METER 430 mg/dL           HH           : Notified

 RN/MD:



             (KUN) (test code =                                        TESTED

 AT [facility] [address]



             1538)                                               [facility],



                                                                 [address]:



                                                                 /Techni

christina ID



                                                                 = [id number] for DEYSI ADRIAN



ERTAPENEM:SUSC:PT:ISOLATE:ORDQN:[id number] 16:48:00Prote[facility] mirabilisMemorial 
HermannURINE AND [id number] 16:48:00Negative (18 10:48 AM)[facility] JZPUU6766-74-31 16:48:00Negative *NA*(18 10:48 AM)[facility] JTTTW4130-98-91 16:48:00Negative *NA*(18 10:48 AM)[facility] ASGQK2321-18-14 16:48:00Trace *ABN*(18 10:48 AM)[facility] 
[facility]ermannURINE AND AQEIG2460-91-22 16:48:000.2[facility]ann[facility] STOOL
2018 16:48:00Large *ABN*(18 10:48 AM)[facility] STOOL
2018 16:48:00Negative (18 10:48 AM)[facility] STOOL
2018 16:48:00Negative (18 10:48 AM)[facility] STOOL
2018 16:48:00





             Test Item    Value        Reference Range Interpretation Comments

 

             UA pH (test code = UA pH) 7.0 1        5.0-8.0                   



[facility]ann[facility] AND [id number] 16:48:00





             Test Item    Value        Reference Range Interpretation Comments

 

             UA Spec Grav (test code = UA Spec 1.015 1                          

      



             Grav)                                               



[facility]ann[facility] AQVGM2142-93-62 16:48:00Yellow *NA*(18 10:48 AM)
[facility] DVMNP2322-65-55 16:48:00Clear (18 10:48 AM)
[facility] KISIW7091-04-22 16:48:00None Seen (18 10:48 AM)
[facility] MBRIDHZ7452-19-79 11:57:00Negative (18 5:57 AM)
[facility] LyttexeUWYFYTDWGF4621-68-68 11:57:00





             Test Item    Value        Reference Range Interpretation Comments

 

             PTT (test code = PTT) 33.4 s       22.9-35.8                 



[facility] RobxfwaSCKNTOSFGF2552-20-94 11:57:00





             Test Item    Value        Reference Range Interpretation Comments

 

             PT (test code = PT) 13.7 s       12.0-14.7                 



[facility] MknqeyuRADEKQTLPU3653-64-49 11:57:00





             Test Item    Value        Reference Range Interpretation Comments

 

             INR (test code = INR) 1.05 1       0.85-1.17                 



[facility] HermannCHEM [id number] 10:50:57615Atpefnpk HermannCHEM PANEL
2018 10:50:019.4[facility] HermannCHEM HBWXH8688-34-89 10:50:0128[facility] 
HermannCHEM NITNK8031-42-26 10:50:0114[facility] HermannCHEM QJHTZ9897-54-41 
10:50:0189[facility]annCHE[facility] YEBRO3129-59-18 10:50:41718Gysyqmqg [facility] HealthannCHEM 
LLYSV9169-06-24 10:50:014.2MGraham [facility] CenterannCHEM XKTXE4983-06-60 10:50:00251
[facility] CenterCHE[facility] CBVYD7210-11-67 10:50:010.85[facility] HermannCHEM PANEL
2018 10:50:019.2MemSeymour HospitalSfpebehLSINMHVPUN1257-95-39 10:50:01





             Test Item    Value        Reference Range Interpretation Comments

 

             ACT (TEG) Rapid (test code = ACT (TEG) 136 s                 

           



             Rapid)                                              



[facility] DentonWkcslrgIMIJLSLHFY0000-62-75 10:50:01





             Test Item    Value        Reference Range Interpretation Comments

 

             Split Point Rapid (test code = Split 0.6 min                       

         



             Point Rapid)                                        



[facility] DentonHstbodrGXAZMXYTZR4603-50-15 10:50:01





             Test Item    Value        Reference Range Interpretation Comments

 

             R-time Rapid (test code = R-time 0.9 min      0.4-0.7              

     



             Rapid)                                              



[facility] DentonBitqvxiEWLLZNHHLD1707-60-65 10:50:01





             Test Item    Value        Reference Range Interpretation Comments

 

             K-time Rapid (test code = K-time 1.4 min      0.6-2.3              

     



             Rapid)                                              



[facility] DentonGtiolmfMVISHTXZZL2911-56-11 10:50:01





             Test Item    Value        Reference Range Interpretation Comments

 

             Angle Rapid (test code = Angle 71 degrees   64-80                  

   



             Rapid)                                              



[facility] DentonMvbsgsxYHITSZLNIU4900-82-84 10:50:0112.7[facility] CenterHEMATOLOGY
2018 10:50:01





             Test Item    Value        Reference Range Interpretation Comments

 

             Max Amplitude Rapid (test code = Max 72 mm        52-71            

         



             Amplitude Rapid)                                        



[facility] DentonXfjrygsVXUVUJKYMC6514-45-55 10:50:010.1MB[facility] DallasHEMATOLOGY
2018 10:50:57328GbcwczwlBellville Medical CenterLpaasgeOXPUKKDLDG3885-43-85 10:50:017.8MemoriH[facility] Medical CenterRjnaemmPXFRRMNNEA8349-59-29 10:50:01





             Test Item    Value        Reference Range Interpretation Comments

 

             MCH (test code = MCH) 27.4 pg      27.0-31.0                 



[facility] DentonEdszpawEJWFVQMBPS1650-16-89 10:50:0180.7[facility] HermannHEMATOLOGY
2018 10:50:0134.0[facility] LdssquwHAYSORRFZB1289-78-90 10:50:0118.9[facility]
UvsmuynAQZSNYOVJF0094-53-94 10:50:0143.3[facility] IyuvgzxKVFAFLJGUW3272-91-68 
10:50:019.3[facility] SuvlaqtXHILZMDSZC1665-61-96 10:50:0114.7[facility] Jose
OKTVFHHYAA5832-68-62 10:50:015.36[facility] HlmqpzxNGYSKTMVNW8325-18-93 10:50:01
0.2[facility] OwuikwvKHPRHEPPBX8469-23-12 10:50:010.1[facility] HermannHEMATOLOGY
2018 10:50:011.8[facility] TlqzwdbECUDANUGUH7464-35-85 10:50:010.9[facility] 
EdztoahERECTLMYJL3672-04-95 10:50:016.3[facility] DykfunsEZNBTQBBNE1637-16-84 
10:50:012.0[facility] DnnzpulNDBMGEBVAJ9105-98-43 10:50:0167.4[facility]
CCNNCNNILX3331-66-66 10:50:0119.9[facility] ZrtzyfzZXYEPWTHHY6073-90-53 10:50:01
1.0[facility] OgmlyieTAVXKEFGZM2675-73-84 10:50:019.7[facility] HermannCHEM PANEL
2018 05:42:00





             Test Item    Value        Reference Range Interpretation Comments

 

             B/C Ratio (test code = B/C Ratio) 17 1         6-25                

      



[facility] HermannCHEM NGPHC7179-91-90 05:42:004.3[facility] HermannCHEM PANEL
2018 05:42:00





             Test Item    Value        Reference Range Interpretation Comments

 

             A/G Ratio (test code = A/G Ratio) 0.7 1        0.7-1.6             

      



[facility] HermannCHEM TYYNP9008-29-12 05:42:0014.4[facility] HermannCHEM PANEL
2018 05:42:50567Tvicutsv HermannCHEM BARIG6452-21-38 05:42:0076Mem[facility] 
HermannCHEM DTTNZ6090-96-82 05:42:0035[facility] HermannCHEM TTMOM1073-48-12 
05:42:002.8[facility] HermannCHEM EOBGB6141-55-50 05:42:007.1[facility] HermannCHEM 
PDHIO7879-37-44 05:42:008.7[facility] HermannCHEM GVFMO4561-70-75 05:42:0018
[facility] HermannCHEM KIMNS6167-83-89 05:42:000.3[facility] HermannCHEM PANEL
2018 05:42:004.4[facility] HermannCHEM NWULM0103-63-84 05:42:81720Bcnvdhyd 
HermannCHEM OCFXC9254-24-15 05:42:0023[facility] HermannCHEM KQELL6196-80-24 
05:42:77506Gzfpxpwt HermannCHEM ELBVT3017-38-87 05:42:001.01[facility] HermannCHEM
VDHFI5764-87-94 05:42:0017[facility] HermannCHEM DYIHQ7970-84-86 05:42:90348
[facility] EiujvjpSGHRYCBQYM1192-97-29 05:42:000.6[facility] HermannHEMATOLOGY
2018 05:42:004.8[facility] MtspttkHPVUOECDEV9790-51-88 05:42:000.7[facility] 
BjqevdpZYDPWMDKOL1202-58-62 05:42:001.9[facility] XvbtlkzJXDGCAWGNL3503-21-57 
05:42:009.4[facility] AkmjolaOHBSFKHOQW0758-31-51 05:42:000.2[facility] [place]
CYUMRXJLAK0910-40-21 05:42:002.9[facility] EmottizGWBIHIHSXY3130-07-61 05:42:00
62.2[facility] UagmtfdNNQBQBXZHY2679-91-09 05:42:0024.9[facility] HermannHEMATOLOGY
2018 05:42:00





             Test Item    Value        Reference Range Interpretation Comments

 

             MCH (test code = MCH) 27.5 pg      27.0-31.0                 



[facility] PbefqbtKAJEGYYIOY6107-73-68 05:42:0085.3[facility] HermannHEMATOLOGY
2018 05:42:0043.9[facility] DjmnxnwIJGMRXHSDZ7312-99-08 05:42:0014.2[facility]
PphepvaAQMHNFIOKV4396-11-94 05:42:007.7[facility] WkqbapzUNXWVSTJXY6949-33-56 
05:42:005.15[facility] PnwaxuoDZVJLEJPEZ2720-47-00 05:42:008.4[facility] Jose
DVGUFZQZWP9284-75-11 05:42:0032.3[facility] HdvdevjABBWOSXLYB6664-17-13 05:42:00
17.3[facility] XteqzksRZHNRKXROV8112-25-85 05:42:56841Ehcgeaed HermannCHEM PANEL
2018 09:36:004.4[facility] HermannCHEM WJXWX4602-00-42 09:36:00





             Test Item    Value        Reference Range Interpretation Comments

 

             A/G Ratio (test code = A/G Ratio) 0.6 1        0.7-1.6             

      



[facility] HermannCHEM GXTMO0546-90-24 09:36:00





             Test Item    Value        Reference Range Interpretation Comments

 

             B/C Ratio (test code = B/C Ratio) 17 1         6-25                

      



[facility] HermannCHEM BVCWR2980-25-59 09:36:0011.3[facility] HermannCHEM PANEL
2018 09:36:78081Vayxbfec HermannCHEM QHYZI3306-85-61 09:36:000.84[facility] 
HermannCHEM VHOLQ4852-43-08 09:36:35619Svtpolpg HermannCHEM ZMEJE0623-89-28 
09:36:0099[facility] HermannCHEM IEBCY2972-49-84 09:36:0014Mem[facility] HermannCHEM 
QDJZM7753-62-00 09:36:0079[facility] HermannCHEM ZKPEW6677-09-89 09:36:000.3
[facility] HermannCHEM HXAIO7247-17-31 09:36:0014Mem[facility] HermannCHEM PANEL
2018 09:36:0043[facility] HermannCHEM HTIDT6539-72-07 09:36:007.1[facility] 
HermannCHEM AAANN9582-46-83 09:36:002.7[facility] HermannCHEM SFPJC1950-88-08 
09:36:009.2[facility] HermannCHEM NBDJS9125-77-46 09:36:0021[facility] HermannCHEM 
LGYEW0276-23-15 09:36:004.3[facility] HermannCHEM CMSVE8102-80-94 09:36:44968
[facility] XgqfkoiJIEYPRVHVX0962-22-46 09:36:0032.5[facility] HermannHEMATOLOGY
2018 09:36:0017.5[facility] XqwuturSLVPUYFQYJ2732-82-63 09:36:11937Yhpmblju 
YiwdcurPTKFKHSWVD3947-78-98 09:36:008.5[facility] AscfiumXCFEEDSXXY2699-17-15 
09:36:006.6[facility] FkedfhiPFKWYEYAEC3777-31-86 09:36:005.17[facility]
GBVGHYLUPM2116-10-18 09:36:0086.1[facility] JlipcyfNCQNBUIABD5731-07-07 09:36:00
44.5[facility] UvacofoJEBPORYBXH5705-59-09 09:36:00





             Test Item    Value        Reference Range Interpretation Comments

 

             MCH (test code = MCH) 28.0 pg      27.0-31.0                 



[facility] OodsqqoTSRCHQBUZA9151-10-79 09:36:0014.5[facility] HermannHEMATOLOGY
2018 09:36:001.7[facility] EojtqhyPPHPXUGRUI5668-63-17 09:36:000.7[facility] 
HtabjltMSJIEPIOQR8590-73-01 09:36:000.2[facility] NkmjtnqTRKOCYCPGN2149-15-60 
09:36:0026.1[facility] HfevaicLRJMLYWXGV8946-98-75 09:36:0059.3[facility]
FGKGTMFTZI3250-20-77 09:36:000.8[facility] ZcxridyNYFYFULMMG2320-91-80 09:36:00
10.5[facility] ZxeifolNKRUXJIOXD3343-26-05 09:36:003.3[facility] HermannHEMATOLOGY
2018 09:36:003.9[facility] KdrantkDYDZXDFZXQ6577-35-75 21:02:009.1[facility] 
HermannCHEM FYDUL3399-56-07 07:07:0074[facility] HermannCHEM ONILQ5361-92-10 
07:07:0012[facility] HermannCHEM TTSSJ8419-60-38 07:07:000.75[facility] HermannCHEM 
PFUGS8588-15-20 07:07:44382Syejzkni HermannCHEM XQDIM3640-77-94 07:07:002.9
[facility] HermannCHEM ZYCWB6706-29-26 07:07:004.4[facility] HermannCHEM PANEL
2018 07:07:00





             Test Item    Value        Reference Range Interpretation Comments

 

             A/G Ratio (test code = A/G Ratio) 0.7 1        0.7-1.6             

      



[facility] HermannCHEM TEUTU2906-98-28 07:07:000.4[facility] HermannCHEM PANEL
2018 07:07:0071[facility] HermannCHEM MEXLQ4887-01-68 07:07:0015[facility] 
HermannCHEM QSUKH2388-47-69 07:07:0028[facility] HermannCHEM BGJBU1174-79-46 
07:07:004.4[facility] HermannCHEM YHQSY6963-63-80 07:07:11262Tdfocane HermannCHEM 
GOALC7322-49-11 07:07:0025[facility] HermannCHEM ETDEK6657-33-60 07:07:61694
[facility] HermannCHEM QWLAZ4913-17-76 07:07:00





             Test Item    Value        Reference Range Interpretation Comments

 

             B/C Ratio (test code = B/C Ratio) 16 1         6-25                

      



[facility] HermannCHEM WGCSK7407-31-52 07:07:008.7[facility] HermannCHEM PANEL
2018 07:07:0012.4[facility] HermannCHEM FKWWR3462-60-94 07:07:007.3[facility] 
OpszijpXPIPUIQWHJ4402-93-21 07:07:93894Pwjzszlk GbixuuxBZCLETMHFF0328-32-24 
07:07:008.7[facility] SfxnvbqNOJIXDPRVS9329-97-62 07:07:006.9[facility]
CIYAPFXCZQ2009-19-61 07:07:005.30[facility] DqzvoflQMUXZOQDDU8676-49-34 07:07:00
32.8[facility] TemybdwXSRZRHVNHY8705-73-08 07:07:00





             Test Item    Value        Reference Range Interpretation Comments

 

             MCH (test code = MCH) 27.9 pg      27.0-31.0                 



[facility] JrmttskRAVBDLLZDK0811-26-84 07:07:0014.8[facility] HermannHEMATOLOGY
2018 07:07:0085.0[facility] KrofytfUPUNUBDRNY4306-90-23 07:07:0045.1[facility]
EycdzbsSPNLTFCIGB2545-81-10 07:07:0017.5[facility] MyprqbzJKDLTBCKJO2110-42-26 
07:07:000.2[facility] SzznwkdCCHXPEVRIC7909-86-50 07:07:002.0[facility]
ZTWOXKYTNH9657-84-47 07:07:000.7[facility] LrhaxqxETBLYTGPNR4680-94-87 07:07:002.4
[facility] BnalqvcBKDSMMNFLP2133-67-15 07:07:000.6[facility] HermannHEMATOLOGY
2018 07:07:004.0[facility] WjtkzkeZPLILOXAEX2427-19-53 07:07:0010.4[facility] 
LvqfipaYRWOUFMSRJ6554-87-09 07:07:00Normal (18 2:07 AM)[facility]
XLUHSINPYW1815-13-12 07:07:0058.1[facility] VuvrzudVEWICGEUIT5258-19-64 07:07:00
Normal (18 2:07 AM)[facility] TliyfqoHENMSBJICT5874-40-04 07:07:0028.5[facility]
ErdnnwmJEMANRGRDG9909-81-30 16:07:000.1[facility] MuqvwzpRUPVCMKKCK9240-02-75 
16:07:00Moderate *ABN*(18 11:07 AM)[facility] SzvozgbTFDNTDQZCQ6421-48-10 
06:43:0022.3[facility] HermannCHEM MXYXS9662-29-53 11:45:002.3[facility] HermannCHEM
GGFZE4482-88-56 11:45:003.5[facility] TyutxrwGRHUEADPLB3292-50-09 11:45:00





             Test Item    Value        Reference Range Interpretation Comments

 

             PT (test code = PT) 14.0 s       12.0-14.7                 



[facility] EbastugAANWMXZVZX7843-29-90 11:45:00





             Test Item    Value        Reference Range Interpretation Comments

 

             PTT (test code = PTT) 37.6 s       22.9-35.8                 



[facility] XfzzfmhOADDJQASLU2495-85-83 11:45:00





             Test Item    Value        Reference Range Interpretation Comments

 

             INR (test code = INR) 1.08 1       0.85-1.17                 



[facility] LzwpafxSDXLAOZQWQ9111-80-16 11:45:0013.1[facility] HermannIMMUNOLOGY
2018 11:45:0025.2[facility] HermannCHEM GZXPP6411-75-15 03:06:000.9[facility] 
ExczsumKVTAHKSTLI2574-75-47 03:06:005[facility] VczjxikJNMAHARDBN1981-46-43 
03:06:0015.8[facility] IggwygcGSVYZZIBEZ7563-41-69 00:44:00





             Test Item    Value        Reference Range Interpretation Comments

 

             PT (test code = PT) 13.0 s       12.0-14.7                 



[facility] MhcquhrMRIZUQWOGL0953-14-31 00:44:00





             Test Item    Value        Reference Range Interpretation Comments

 

             INR (test code = INR) 0.98 1       0.85-1.17                 



[facility] DaehdyxMSMVKRTGIK2999-86-76 00:44:00





             Test Item    Value        Reference Range Interpretation Comments

 

             PTT (test code = PTT) 33.2 s       22.9-35.8                 



[facility]annBL[facility] USZQXJD9710-86-31 23:51:00Negative (5/3/18 6:51 PM)
[facility]annURINE AND AYUVO7463-20-75 23:35:000.2[facility] HermannURINE AND 
UDHPT6452-15-63 23:35:00Negative (5/3/18 6:35 PM)[facility]annUR[facility] AND STOOL
2018 23:35:00Negative (5/3/18 6:35 PM)[facility]annUR[facility] AND STOOL
2018 23:35:00Negative *NA*(5/3/18 6:35 PM)[facility]annUR[facility] AND STOOL
2018 23:35:00Negative *NA*(5/3/18 6:35 PM)[facility] AND STOOL
2018 23:35:00Trace *ABN*(5/3/18 6:35 PM)[facility]annUR[facility] AND STOOL
2018 23:35:00Small *ABN*(5/3/18 6:35 PM)[facility]annUR[facility] AND STOOL
2018 23:35:00





             Test Item    Value        Reference Range Interpretation Comments

 

             UA Spec Grav (test code = UA Spec 1.020 1                          

      



             Grav)                                               



[facility]annURINE AND MIGAR6812-88-94 23:35:00





             Test Item    Value        Reference Range Interpretation Comments

 

             UA pH (test code = UA pH) 6.0 1        5.0-8.0                   



[facility]annURINE AND CXOXV9815-40-89 23:35:00Yellow *NA*(5/3/18 6:35 PM)
[facility]annUR[facility] AND FKQYS7396-01-90 23:35:00Trace *ABN*(5/3/18 6:35 PM)
[facility]annUR[facility] AND FOSFH5053-32-09 23:35:00Slight Cloudy (5/3/18 6:35 PM)
[facility]annURINE AND XATMV5018-23-83 23:35:000-2 (5/3/18 6:35 PM)[facility] 
WdmfexuTBRDKFVOFD7036-85-73 23:20:000.1[facility] [id number] 
23:20:00Normal (5/3/18 6:20 PM)[id number] 16:22:00





             Test Item    Value        Reference Range Interpretation Comments

 

             CULTURE (BEAKER) (test No growth in 5 days                         

  



             code = 1095)                                        



BLOOD [id number] 16:22:00





             Test Item    Value        Reference Range Interpretation Comments

 

             CULTURE (BEAKER) (test No growth in 5 days                         

  



             code = 1095)                                        



(MANUAL DIFFERENTIAL)2017 22:29:00





             Test Item    Value        Reference Range Interpretation Comments

 

             TOTAL COUNTED (BEAKER) (test code =                                

        



             1351)                                               

 

             WBC MORPHOLOGY (BEAKER) (test code = Normal                        

         



             487)                                                

 

             PLT MORPHOLOGY (BEAKER) (test code = Normal                        

         



             486)                                                

 

             RBC MORPHOLOGY (BEAKER) (test code = Normal                        

         



             762)                                                



CBC W/PLT COUNT &amp; AUTO [id number] 22:28:00





             Test Item    Value        Reference Range Interpretation Comments

 

             WHITE BLOOD CELL COUNT (BEAKER) 7.3 K/ L     4.0-10.0              

    



             (test code = 775)                                        

 

             RED BLOOD CELL COUNT (BEAKER) 5.09 M/ L    4.20-5.80               

  



             (test code = 761)                                        

 

             HEMOGLOBIN (BEAKER) (test code = 13.0 GM/DL   13.0-16.8            

     



             410)                                                

 

             HEMATOCRIT (BEAKER) (test code = 42.3 %       40.0-50.0            

     



             411)                                                

 

             MEAN CORPUSCULAR VOLUME (BEAKER) 83.0 fL      82.0-98.0            

     



             (test code = 753)                                        

 

             MEAN CORPUSCULAR HEMOGLOBIN 25.6 pg      27.0-33.0    L            



             (BEAKER) (test code = 751)                                        

 

             MEAN CORPUSCULAR HEMOGLOBIN CONC 30.8 GM/DL   32.0-36.0    L       

     



             (BEAKER) (test code = 752)                                        

 

             RED CELL DISTRIBUTION WIDTH 18.0 %       10.3-14.2    H            



             (BEAKER) (test code = 412)                                        

 

             PLATELET COUNT (BEAKER) (test 331 K/CU MM  150-430                 

  



             code = 756)                                         

 

             MEAN PLATELET VOLUME (BEAKER) 8.5 fL       6.5-10.5                

  



             (test code = 754)                                        

 

             NUCLEATED RED BLOOD CELLS 0 /100 WBC   0-0                       



             (BEAKER) (test code = 413)                                        

 

             NEUTROPHILS RELATIVE PERCENT 65 %                                  

 



             (BEAKER) (test code = 429)                                        

 

             LYMPHOCYTES RELATIVE PERCENT 23 %                                  

 



             (BEAKER) (test code = 430)                                        

 

             MONOCYTES RELATIVE PERCENT 8 %                                    



             (BEAKER) (test code = 431)                                        

 

             EOSINOPHILS RELATIVE PERCENT 3 %                                   

 



             (BEAKER) (test code = 432)                                        

 

             BASOPHILS RELATIVE PERCENT 1 %                                    



             (BEAKER) (test code = 437)                                        

 

             NEUTROPHILS ABSOLUTE COUNT 4.75 K/ L    1.80-8.00                 



             (BEAKER) (test code = 670)                                        

 

             LYMPHOCYTES ABSOLUTE COUNT 1.68 K/ L    1.48-4.50                 



             (BEAKER) (test code = 414)                                        

 

             MONOCYTES ABSOLUTE COUNT (BEAKER) 0.55 K/ L    0.00-1.30           

      



             (test code = 415)                                        

 

             EOSINOPHILS ABSOLUTE COUNT 0.24 K/ L    0.00-0.50                 



             (BEAKER) (test code = 416)                                        

 

             BASOPHILS ABSOLUTE COUNT (BEAKER) 0.05 K/ L    0.00-0.20           

      



             (test code = 417)                                        



[id number] METABOLIC [id number] 11:11:00





             Test Item    Value        Reference Range Interpretation Comments

 

             SODIUM (BEAKER) 138 meq/L    136-145                   



             (test code = 381)                                        

 

             POTASSIUM (BEAKER) 4.0 meq/L    3.5-5.1                   



             (test code = 379)                                        

 

             CHLORIDE (BEAKER) 108 meq/L           H            



             (test code = 382)                                        

 

             CO2 (BEAKER) (test 23 meq/L     22-29                     



             code = 355)                                         

 

             BLOOD UREA NITROGEN 11 mg/dL     7-21                      



             (BEAKER) (test code                                        



             = 354)                                              

 

             CREATININE (BEAKER) 0.74 mg/dL   0.57-1.25                 



             (test code = 358)                                        

 

             GLUCOSE RANDOM 124 mg/dL           H            



             (BEAKER) (test code                                        



             = 652)                                              

 

             CALCIUM (BEAKER) 8.9 mg/dL    8.4-10.2                  



             (test code = 697)                                        

 

             EGFR (BEAKER) (test 150 mL/min/1.73                           ESTIM

ATED GFR IS



             code = 1092) sq m                                   NOT AS ACCURATE

 AS



                                                                 CREATININE



                                                                 CLEARANCE IN



                                                                 PREDICTING



                                                                 GLOMERULAR



                                                                 FILTRATION RATE

.



                                                                 ESTIMATED GFR I

S



                                                                 NOT APPLICABLE 

FOR



                                                                 DIALYSIS PATIEN

TS.



URINE [id number] 09:56:00





             Test Item    Value        Reference Range Interpretation Comments

 

             CULTURE (BEAKER) (test <10,000 col/mL skin                         

  



             code = 1095) jaime                                  



COMPREHENSIVE METABOLIC [id number] 08:49:00





             Test Item    Value        Reference Range Interpretation Comments

 

             TOTAL PROTEIN 7.3 gm/dL    6.0-8.3                   



             (BEAKER) (test code =                                        



             770)                                                

 

             ALBUMIN (BEAKER) 3.3 g/dL     3.5-5.0      L            



             (test code = 1145)                                        

 

             ALKALINE PHOSPHATASE 89 U/L                           



             (BEAKER) (test code =                                        



             346)                                                

 

             BILIRUBIN TOTAL 1.4 mg/dL    0.2-1.2      H            



             (BEAKER) (test code =                                        



             377)                                                

 

             SODIUM (BEAKER) (test 137 meq/L    136-145                   



             code = 381)                                         

 

             POTASSIUM (BEAKER) 3.7 meq/L    3.5-5.1                   



             (test code = 379)                                        

 

             CHLORIDE (BEAKER) 108 meq/L           H            



             (test code = 382)                                        

 

             CO2 (BEAKER) (test 17 meq/L     22-29        L            



             code = 355)                                         

 

             BLOOD UREA NITROGEN 12 mg/dL     7-21                      



             (BEAKER) (test code =                                        



             354)                                                

 

             CREATININE (BEAKER) 0.78 mg/dL   0.57-1.25                 



             (test code = 358)                                        

 

             GLUCOSE RANDOM 119 mg/dL           H            



             (BEAKER) (test code =                                        



             652)                                                

 

             CALCIUM (BEAKER) 8.6 mg/dL    8.4-10.2                  



             (test code = 697)                                        

 

             AST (SGOT) (BEAKER) 13 U/L       5-34                      



             (test code = 353)                                        

 

             ALT (SGPT) (BEAKER) 28 U/L       6-55                      



             (test code = 347)                                        

 

             EGFR (BEAKER) (test 141                                    ESTIMATE

D GFR IS



             code = 1092) mL/min/1.73 sq                           NOT AS ACCURA

TE AS



                          m                                      CREATININE



                                                                 CLEARANCE IN



                                                                 PREDICTING



                                                                 GLOMERULAR



                                                                 FILTRATION RATE

.



                                                                 ESTIMATED GFR I

S



                                                                 NOT APPLICABLE 

FOR



                                                                 DIALYSIS PATIEN

TS.



CBC W/PLT COUNT &amp; AUTO [id number] 08:46:00





             Test Item    Value        Reference Range Interpretation Comments

 

             WHITE BLOOD CELL COUNT (BEAKER) 9.4 K/ L     4.0-10.0              

    



             (test code = 775)                                        

 

             RED BLOOD CELL COUNT (BEAKER) 4.79 M/ L    4.20-5.80               

  



             (test code = 761)                                        

 

             HEMOGLOBIN (BEAKER) (test code = 12.8 GM/DL   13.0-16.8    L       

     



             410)                                                

 

             HEMATOCRIT (BEAKER) (test code = 40.0 %       40.0-50.0            

     



             411)                                                

 

             MEAN CORPUSCULAR VOLUME (BEAKER) 83.4 fL      82.0-98.0            

     



             (test code = 753)                                        

 

             MEAN CORPUSCULAR HEMOGLOBIN 26.7 pg      27.0-33.0    L            



             (BEAKER) (test code = 751)                                        

 

             MEAN CORPUSCULAR HEMOGLOBIN CONC 32.0 GM/DL   32.0-36.0            

     



             (BEAKER) (test code = 752)                                        

 

             RED CELL DISTRIBUTION WIDTH 18.2 %       10.3-14.2    H            



             (BEAKER) (test code = 412)                                        

 

             PLATELET COUNT (BEAKER) (test 313 K/CU MM  150-430                 

  



             code = 756)                                         

 

             MEAN PLATELET VOLUME (BEAKER) 8.6 fL       6.5-10.5                

  



             (test code = 754)                                        

 

             NUCLEATED RED BLOOD CELLS 0 /100 WBC   0-0                       



             (BEAKER) (test code = 413)                                        

 

             NEUTROPHILS RELATIVE PERCENT 70 %                                  

 



             (BEAKER) (test code = 429)                                        

 

             LYMPHOCYTES RELATIVE PERCENT 16 %                                  

 



             (BEAKER) (test code = 430)                                        

 

             MONOCYTES RELATIVE PERCENT 12 %                                   



             (BEAKER) (test code = 431)                                        

 

             EOSINOPHILS RELATIVE PERCENT 1 %                                   

 



             (BEAKER) (test code = 432)                                        

 

             BASOPHILS RELATIVE PERCENT 1 %                                    



             (BEAKER) (test code = 437)                                        

 

             NEUTROPHILS ABSOLUTE COUNT 6.54 K/ L    1.80-8.00                 



             (BEAKER) (test code = 670)                                        

 

             LYMPHOCYTES ABSOLUTE COUNT 1.50 K/ L    1.48-4.50                 



             (BEAKER) (test code = 414)                                        

 

             MONOCYTES ABSOLUTE COUNT (BEAKER) 1.13 K/ L    0.00-1.30           

      



             (test code = 415)                                        

 

             EOSINOPHILS ABSOLUTE COUNT 0.13 K/ L    0.00-0.50                 



             (BEAKER) (test code = 416)                                        

 

             BASOPHILS ABSOLUTE COUNT (BEAKER) 0.06 K/ L    0.00-0.20           

      



             (test code = 417)                                        



0.00URINALYSIS W/ [id number] 20:36:00





             Test Item    Value        Reference Range Interpretation Comments

 

             COLOR (BEAKER) (test code = 470) Yellow                            

     

 

             CLARITY (BEAKER) (test code = 469) Hazy                            

       

 

             SPECIFIC GRAVITY UA (BEAKER) (test 1.012        1.001-1.035        

       



             code = 468)                                         

 

             PH UA (BEAKER) (test code = 467) 5.5          5.0-8.0              

     

 

             PROTEIN UA (BEAKER) (test code = 100 mg/dL    Negative     A       

     



             464)                                                

 

             GLUCOSE UA (BEAKER) (test code = Negative     Negative             

     



             365)                                                

 

             KETONES UA (BEAKER) (test code = Negative     Negative             

     



             371)                                                

 

             BILIRUBIN UA (BEAKER) (test code = Negative     Negative           

       



             462)                                                

 

             BLOOD UA (BEAKER) (test code = 461) Moderate     Negative     A    

        

 

             NITRITE UA (BEAKER) (test code = Negative     Negative             

     



             465)                                                

 

             LEUKOCYTE ESTERASE UA (BEAKER) Large        Negative     A         

   



             (test code = 466)                                        

 

             UROBILINOGEN UA (BEAKER) (test code 3.0 mg/dL    0.2-1.0      H    

        



             = 463)                                              

 

             RBC UA (BEAKER) (test code = 519) 19 /HPF                          

      

 

             WBC UA (BEAKER) (test code = 520) 182 /HPF                         

      

 

             SOURCE(BEAKER) (test code = 2795)                                  

      



BASIC METABOLIC [id number] 17:00:00





             Test Item    Value        Reference Range Interpretation Comments

 

             SODIUM (BEAKER) 140 meq/L    136-145                   



             (test code = 381)                                        

 

             POTASSIUM (BEAKER) 3.7 meq/L    3.5-5.1                   



             (test code = 379)                                        

 

             CHLORIDE (BEAKER) 112 meq/L           H            



             (test code = 382)                                        

 

             CO2 (BEAKER) (test 17 meq/L     22-29        L            



             code = 355)                                         

 

             BLOOD UREA NITROGEN 23 mg/dL     7-21         H            



             (BEAKER) (test code                                        



             = 354)                                              

 

             CREATININE (BEAKER) 1.00 mg/dL   0.57-1.25                 



             (test code = 358)                                        

 

             GLUCOSE RANDOM 102 mg/dL                        



             (BEAKER) (test code                                        



             = 652)                                              

 

             CALCIUM (BEAKER) 8.7 mg/dL    8.4-10.2                  



             (test code = 697)                                        

 

             EGFR (BEAKER) (test 106 mL/min/1.73                           ESTIM

ATED GFR IS



             code = 1092) sq m                                   NOT AS ACCURATE

 AS



                                                                 CREATININE



                                                                 CLEARANCE IN



                                                                 PREDICTING



                                                                 GLOMERULAR



                                                                 FILTRATION RATE

.



                                                                 ESTIMATED GFR I

S



                                                                 NOT APPLICABLE 

FOR



                                                                 DIALYSIS PATIEN

TS.



Specimen slightly ictericCBC W/PLT COUNT &amp; AUTO [id number] 
12:18:00





             Test Item    Value        Reference Range Interpretation Comments

 

             WHITE BLOOD CELL COUNT (BEAKER) 16.4 K/ L    4.0-10.0     H        

    



             (test code = 775)                                        

 

             RED BLOOD CELL COUNT (BEAKER) 5.22 M/ L    4.20-5.80               

  



             (test code = 761)                                        

 

             HEMOGLOBIN (BEAKER) (test code = 14.4 GM/DL   13.0-16.8            

     



             410)                                                

 

             HEMATOCRIT (BEAKER) (test code = 42.9 %       40.0-50.0            

     



             411)                                                

 

             MEAN CORPUSCULAR VOLUME (BEAKER) 82.2 fL      82.0-98.0            

     



             (test code = 753)                                        

 

             MEAN CORPUSCULAR HEMOGLOBIN 27.5 pg      27.0-33.0                 



             (BEAKER) (test code = 751)                                        

 

             MEAN CORPUSCULAR HEMOGLOBIN CONC 33.5 GM/DL   32.0-36.0            

     



             (BEAKER) (test code = 752)                                        

 

             RED CELL DISTRIBUTION WIDTH 16.2 %       10.3-14.2    H            



             (BEAKER) (test code = 412)                                        

 

             PLATELET COUNT (BEAKER) (test 329 K/CU MM  150-430                 

  



             code = 756)                                         

 

             MEAN PLATELET VOLUME (BEAKER) 8.2 fL       6.5-10.5                

  



             (test code = 754)                                        

 

             NUCLEATED RED BLOOD CELLS 0 /100 WBC   0-0                       



             (BEAKER) (test code = 413)                                        

 

             NEUTROPHILS RELATIVE PERCENT 83 %                                  

 



             (BEAKER) (test code = 429)                                        

 

             LYMPHOCYTES RELATIVE PERCENT 7 %                                   

 



             (BEAKER) (test code = 430)                                        

 

             MONOCYTES RELATIVE PERCENT 9 %                                    



             (BEAKER) (test code = 431)                                        

 

             EOSINOPHILS RELATIVE PERCENT 0 %                                   

 



             (BEAKER) (test code = 432)                                        

 

             BASOPHILS RELATIVE PERCENT 0 %                                    



             (BEAKER) (test code = 437)                                        

 

             NEUTROPHILS ABSOLUTE COUNT 1.62 K/ L    1.80-8.00    L            



             (BEAKER) (test code = 670)                                        

 

             LYMPHOCYTES ABSOLUTE COUNT 1.15 K/ L    1.48-4.50    L            



             (BEAKER) (test code = 414)                                        

 

             MONOCYTES ABSOLUTE COUNT (BEAKER) 1.56 K/ L    0.00-1.30    H      

      



             (test code = 415)                                        

 

             EOSINOPHILS ABSOLUTE COUNT 0.01 K/ L    0.00-0.50                 



             (BEAKER) (test code = 416)                                        

 

             BASOPHILS ABSOLUTE COUNT (BEAKER) 0.02 K/ L    0.00-0.20           

      



             (test code = 417)                                        



(MANUAL DIFFERENTIAL)2017 12:18:00





             Test Item    Value        Reference Range Interpretation Comments

 

             TOTAL COUNTED (BEAKER) (test code =                                

        



             1351)                                               

 

             WBC MORPHOLOGY (BEAKER) (test code = Normal                        

         



             487)                                                

 

             PLT MORPHOLOGY (BEAKER) (test code = Normal                        

         



             486)                                                

 

             RBC MORPHOLOGY (BEAKER) (test code = Normal                        

         



             762)

## 2020-06-25 NOTE — XMS REPORT
Continuity of Care Document

                            Created on:2020



Patient:JORDAN VERDIN JR

Sex:Male

:1985

External Reference #:[id number]





Demographics







                          Address                   [address] [place] [id number]

 

                          Home Phone                [phone]

 

                          Phone                     [phone]

 

                          Preferred Language        en-US

 

                          Marital Status            Unknown

 

                          Hinduism Affiliation     Unknown

 

                          Race                      Unknown

 

                          Ethnic Group              Unknown









Author







                          Organization              [employer]

 [facility]









Care Team Providers







                    Name                Role                Phone

 

                    [facility] Unavailable         Un

available









Problems







          Problem   Status    Onset     Classification Date      Comments  Sourc

e



                              Date                Reported            



                                                                      



                                                                      



                                                                      

 

          Headache            20           [address]



                                                                      



                                                                      

 

          HEADACHE  Active    20                                [address]



                                                                      



                                                                      

 

          SHUNT MALFUNCTION Active    20                                [address]



                                                                      



                                                                      

 

          ACUTE HEADACHE Active    20                                [address]



                                                                      



                                                                      

 

          Acute pain Active              Problem   2019           [facility]



          (finding)                                                   Neuro,[facility]



                                                                      



                                                                      

 

          Asthma (disorder) Resolved            Problem   2019           [facility]

[facility]



                                                                      Neuro,[facility]



                                                                      



                                                                      

 

          Bronchitis Resolved            Problem   2019           [facility]



          (disorder)                                                   Neuro,[facility]



                                                                      



                                                                      

 

          Cerebral palsy Resolved            Problem   2019           [facility]

her



          (disorder)                                                   Neuro,[facility]



                                                                      



                                                                      

 

          Headache  Active              Problem   2019           [facility]



          (finding)                                                   Neuro,[facility]



                                                                      



                                                                      

 

          Hydrocephalus Resolved            Problem   2019           [facility]

er



          (disorder)                                                   Neuro,[facility]



                                                                      



                                                                      

 

          Osteomyelitis Resolved            Problem   2019           [facility]

er



          (disorder)                                                   Neuro,[facility]



                                                                      



                                                                      

 

          Spina bifida,                               2019           [facility]



                                                                      



                                                                      

 

          Nausea with                               2019            [place]

s



          vomiting,                                                   Medical



          unspecified                                                   Center



                                                                      



                                                                      

 

          Diplopia                                2019           [facility]



                                                                      



                                                                      

 

          Cerebral palsy,                               2019           [facility]



                                                                      



                                                                      

 

          Acquired absence                               2019           [facility]



          of other                                                    Medical



          specified parts                                                   Cent

er



          of digestive                                                   



          tract                                                       



                                                                      

 

          Nicotine                                2019           [facility]



          dependence,                                                   Medical



          cigarettes,                                                   Center



          uncomplicated                                                   



                                                                      



                                                                      

 

          Presence of                               2019            [facility]

s



          cerebrospinal                                                   Medica

l



          fluid drainage                                                   [facility]

r



          device                                                      



                                                                      

 

          Allergy status to                               2019           [facility]



          other antibiotic                                                   Med

ical



          agents status                                                   Center



                                                                      



                                                                      

 

          Allergy status to                               2019           [facility]



          other drugs,                                                   Medical



          medicaments and                                                   Cent

er



          biological                                                   



          substances status                                                   



                                                                      



                                                                      

 

          Allergy status to                               2019           [facility]



          narcotic agent                                                   Medic

al



          status                                                      Center



                                                                      



                                                                      

 

          HEADACHE  Active                                            [facility]



                                                                      



                                                                      







Medications







        Medication Details Route   Status  Patient Ordering Order   Source



                                        Instructions Provider Date    



                                                                



                                                                



                                                                

 

        normal saline 1,000 mL, Rate:         Inactive                 

[facility] [place]



        0.9% IV 1,000 100 ml/hr,                                 2018    Medical



        mL      Infuse over: 10                                         Center



                hr, Route: IV,                                         



                Dosing Weight                                         



                131.818 kg,                                         



                Total Volume:                                         



                1,000, Start                                         



                date: 18                                         



                5:04:00 CST,                                         



                Duration: 30                                         



                day, Stop date:                                         



                18                                         



                5:03:00 CST,                                         



                2.4, m2                                         



                                                                

 

        Magnesium Notes: WASTE:         Inactive                  [facility]

s



        Sulfate F/P - Sink; E -                                 2018    Medical



                [facility] Trash                                         Center



                Bin                                             



                                                                

 

        Isolyte S Notes: (Same         Inactive                  [place]



        PH-7.4 (Bolus) as: [facility] S                                     Med

ical



        IV      PH 7.4)                                         Center



                                                                



                                                                

 

        Phenergan 12.5 mg, 0.5         Inactive                  [place]



                mL, Route:                                 2018    Medical



                IVPB, Drug                                         Center



                form: INJ,                                         



                ONCE, Dosing                                         



                Weight 131.818,                                         



                kg, Priority:                                         



                STAT, Start                                         



                date: 18                                         



                4:35:00 CST,                                         



                Stop date:                                         



                18                                         



                4:35:00 CST                                         



                                                                



                                                                

 

        Docusate Sodium 100 mg = 1 cap,         Active                   [place]



        100 MG Oral PO, BID, 0                                 2018    Medical



        Capsule Refill(s)                                         Center



                                                                



                                                                

 

        Zosyn   0 Refill(s)         Active                   [place]



                                                        2018    Medical



                                                                [place]



                                                                



                                                                

 

        celecoxib 200 200 mg = 1 cap,         Active                    

 [place]



        mg oral capsule PO, BID, 0                                 2018    Medic

al



                Refill(s)                                         Center



                                                                



                                                                

 

        ascorbic acid 500 mg = 1 tab,         Active                    

 [place]



                PO, BID, 0                                 2018    Medical



                Refill(s)                                         Center



                                                                



                                                                

 

        acetaminophen 1,000 mg = 2         Active                     [facility]

[place]



        500 mg oral tab, PO,                                 2018    Medical



        tablet  Q6Hnow, 0                                         Center



                Refill(s)                                         



                                                                



                                                                

 

        Oxycodone 5 mg = 1 tab,         Active                     [place]



        Hydrochloride 5 PO, Q4H, PRN                                 2018    Med

ical



        MG Oral Tablet Pain Score 4-6,                                         C

enter



                0 Refill(s)                                         



                                                                



                                                                

 

        zinc sulfate 220 mg = 1 cap,         Active                     

[place]



        220 mg oral PO, Daily, 0                                 2018    Medical



        capsule Refill(s)                                         Center



                                                                



                                                                

 

        multivitamin 1 tab, PO,         Active                    [facility]



                Daily, 0                                 2018    Medical



                Refill(s)                                         Center



                                                                



                                                                

 

        methocarbamol 1,000 mg = 2         Active                     [facility]

[place]



        500 mg oral tab, PO, Q8H, 0                                 2018    Medi

[facility]



        tablet  Refill(s)                                         Center



                                                                



                                                                

 

        LORazepam 0.5 0.5 mg = 1 tab,         Active                    

 [place]



        mg oral tablet PO, Q8H, PRN                                 2018    Medi

[facility]



                Anxiety, 0                                         Center



                Refill(s)                                         



                                                                



                                                                

 

        Lidocaine 3 patch, TOP,         Active                     [place]



        Hydrochloride Daily, Remove                                 2018    Medi

[facility]



        0.05 MG/MG after 12 hours,                                         [facility]

r



        Transdermal 0 Refill(s)                                         



        Patch                                                   



        [Lidoderm]                                                 



                                                                



                                                                

 

        Robaxin Notes: (Same         No Longer                    [place]



                as:Robaxin)         Active                  2018    Medical



                                                                Center



                                                                



                                                                

 

        Oxycodone Notes: (Same         No Longer                    [place]

s



        Hydrochloride 5 as: Roxicodone)         Active                  2018    

Medical



        MG Oral Tablet                                                 Center



                                                                



                                                                

 

        Ativan  Notes: (Same         No Longer                    [place]



                as: Ativan)         Active                  2018    Medical



                                                                Center



                                                                



                                                                

 

        Trazodone Notes: (Same         No Longer                    [place]

s



        Hydrochloride As: Desyrel)         Active                  2018    Medic

al



        50 MG Oral                                                 Center



        Tablet                                                  



                                                                

 

        remove patch Notes: Remove         No Longer                    

[place]



                patch 12 hours         Active                  2018    Medical



                after                                           Center



                application                                         



                each day.                                         



                                                                



                                                                

 

        Oxycodone Notes: (Same         Inactive                    [place]



        Hydrochloride 5 as: Roxicodone)                                 2018    

Medical



        MG Oral Tablet                                                 Center



                                                                



                                                                

 

        Celebrex Notes: NSAID.         No Longer                    [place]

s



                Please check         Active                  2018    Medical



                indication. Not                                         Center



                for seizure.                                         



                (Same As:                                         



                CeleBREX)                                         



                                                                



                                                                

 

        Vancomycin  2001 mg:         No Longer                    [place]



                infuse over 2.5         Active                  2018    Medical



                hours                                           Center



                                                                



                                                                

 

        Lidocaine Notes: Apply         No Longer                    [place]

s



        Hydrochloride only once for         Active                  2018    Medi

[place]



        0.05 MG/MG up to 12 hours                                         Center



        Transdermal in a 24-hour                                         



        Patch   period (12                                         



        [Lidoderm] hours on and 12                                         



                hours off).                                         



                (Same as:                                         



                Lidoderm)                                         



                "Remove old                                         



                patch before                                         



                application of                                         



                new patch"                                         



                                                                



                                                                

 

        Phenergan Notes: Do not         Inactive                    [place]

s



                give IV push.                                 2018    Medical



                (Same as:                                         Center



                Phenergan)                                         



                                                                



                                                                

 

        Dilaudid Notes: Same as         Inactive                    [place]

s



                Dilaudid                                 2018    Medical



                                                                Center



                                                                



                                                                

 

        Tramadol Notes: Not to         No Longer                    [place]

[facility]



                exceed          Active                  2018    Medical



                400mg/day.                                         Center



                (Same As:                                         



                Ultram)                                         



                                                                

 

        gabapentin Notes: (Same         No Longer                    [facility]

as



                as: Neurontin)         Active                  2018    Medical



                                                                Center



                                                                



                                                                

 

        Acetaminophen Notes: Max         No Longer                    [facility]

[place]



                acetaminophen         Active                  2018    Medical



                4000 mg/day (4                                         Center



                gm/day).  (Same                                         



                as: Tylenol                                         



                Extra Strength)                                         



                                                                



                                                                

 

        Robaxin Notes: (Same         No Longer                    [place]



                as:Robaxin)         Active                  2018    Medical



                                                                Center



                                                                



                                                                

 

        Oxycodone Notes: (Same         No Longer                    [place]

s



        Hydrochloride 5 as: Roxicodone)         Active                  2018    

Medical



        MG Oral Tablet                                                 Center



                                                                



                                                                

 

        Beneprotein 7 Notes: (Same         No Longer                   [facility]



        gm pkt  as:             Active                      Medical



                Beneprotein)                                         [place]



                                                                



                                                                

 

        Acetaminophen 1 tab, PO, TID,         No Longer                   

[facility]



        325 MG / 0 Refill(s)         Active                      Medical



        Oxycodone                                                 [place]



        Hydrochloride                                                 



        10 MG Oral                                                 



        Tablet                                                  



        [Percocet                                                 



        10/325]                                                 



                                                                

 

        Dilaudid 0.5 mg, 0.25         Inactive                   [facility]



                mL, Route: IVP,                                     Medical



                Drug form: INJ,                                         Center



                ONCE, Start                                         



                date: 18                                         



                11:01:00 CDT,                                         



                Stop date:                                         



                18                                         



                11:01:00 CDT                                         



                                                                



                                                                

 

        Phenergan Notes: (Same         Inactive                   [facility]



                as: Phenergan)                                 2018    Medical



                                                                [place]



                                                                



                                                                

 

        Dilaudid 2 mg, Route:         Inactive                   [facility]



                IVP, ONCE,                                 2018    Medical



                Dosing Weight                                         Center



                127.027, kg,                                         



                Priority: STAT,                                         



                Start date:                                         



                18                                         



                10:43:00 CDT,                                         



                Stop date:                                         



                18                                         



                10:43:00 CDT                                         



                                                                



                                                                

 

        Docusate Notes: (Same         No Longer                    [place]



                as: Colace) (Do         Active                      Medical



                Not Crush)                                         Center



                                                                



                                                                

 

        Zinc Sulfate Notes: (Zinc         No Longer                    [facility]

[facility]



                sulfate         Active                      Medical



                capsule) - 220                                         Center



                mg Zinc sulfate                                         



                = 50 mg                                         



                elemental zinc                                         



                Same as Zinc                                         



                Sulfate                                         



                                                                

 

        ascorbic acid Notes: (Same         No Longer                    

[place]



                as: Vitamin C)         Active                  2018    Medical



                                                                Center



                                                                



                                                                

 

        multivitamin Notes: (Same         No Longer                    [facility]

[place]



                as:Thera)         Active                  2018    Medical



                WASTE: F/P -                                         Center



                Black; E -                                         



                Municipal Trash                                         



                Bin  Take with                                         



                food.                                           



                                                                

 

        Naproxen Notes: (Same         Inactive                    [place]



                as: Naprosyn)                                 2018    Medical



                Take with food.                                         Center



                                                                



                                                                

 

        Zosyn   Notes: (Same         No Longer                    [place]



                as: Zosyn)         Active                  2018    Medical



                Dosing based on                                         Center



                Piperacillin                                         



                component   ***                                         



                MEDICATION                                         



                WASTE ***                                         



                Product Size:                                         



                3375 mg Product                                         



                Wasted:  ___ mg                                         



                                                                



                                                                

 

        Vancomycin  2001 mg:         No Longer                    [place]



                infuse over 2.5         Active                  2018    Medical



                hours  For                                         Center



                adult patients                                         



                only: Round to                                         



                nearest 250 mg                                         



                per Medical                                         



                Staff approval                                         



                 *** MEDICATION                                         



                WASTE ***                                         



                Product Size:                                         



                1000 mg Product                                         



                Wasted:  ___ mg                                         



                                                                



                                                                

 

        Enoxaparin Notes: (Same         No Longer                    [facility]

as



                as: Lovenox)         Active                  2018    [facility]



                                                                



                                                                

 

        Sodium Chloride 1,000 mL, Rate:         No Longer                 

   [place]



        0.9% IV 1,000 125 ml/hr,         Active                  2018    Medical



        mL      Infuse over: 8                                         Center



                hr, Route: IV,                                         



                Dosing Weight                                         



                127.27 kg,                                         



                Total Volume:                                         



                1,000, Start                                         



                date: 18                                         



                1:46:00 CDT,                                         



                Duration: 30                                         



                day, Stop date:                                         



                18                                         



                1:45:00 CDT,                                         



                2.44, m2                                         



                                                                

 

        Saline Flush Notes: (Same         No Longer                    [facility]

[place]



        0.9%    as: BD          Active                  2018    Medical



                Posiflush)                                         Center



                                                                



                                                                

 

        Acetaminophen Notes: Do not         Inactive                   [facility]



                exceed 4                                 2018    Medical



                gm/day.  (Same                                         Center



                as: Tylenol)                                         



                                                                



                                                                

 

        Acetaminophen Notes: (Same         Inactive                    [facility]

[place]



        325 MG / as: Norco                                 2018    Medical



        Hydrocodone 325/5)  Do not                                         [facility]

r



        Bitartrate 5 MG exceed 4gm/day                                         



        Oral Tablet of                                              



                acetaminophen.                                         



                                                                



                                                                

 

        Reglan  Notes: (Same         Inactive                    [place]



                as: Reglan)                                 2018    Medical



                                                                Center



                                                                



                                                                

 

        Benadryl Notes: (Same         Inactive                   [facility]



                as: Benadryl)                                 2018    [facility]



                                                                



                                                                

 

        Magnesium Notes: WASTE:         Inactive                    [place]

[place]



        Sulfate F/P - Sink; E -                                 2018    Medical



                Municipal Trash                                         Center



                Bin                                             



                                                                

 

        Sodium Chloride 1,000 mL, 1000         Inactive                   

 [place]



        0.9% (Bolus) IV ml/hr, Infuse                                 2018    Me

dical



                Over: 1 hr,                                         Center



                Route: IV,                                         



                1,000, Drug                                         



                form: INJ,                                         



                ONCE, Priority:                                         



                STAT, Dosing                                         



                Weight 127.273                                         



                kg, Start date:                                         



                18                                         



                23:26:00 CDT,                                         



                Stop date:                                         



                18                                         



                23:26:00 CDT                                         



                                                                



                                                                

 

        Zosyn   4.5 gm, Route:         Inactive                 [facility]



                IVPB, ONCE,                                 2018    Medical



                Dosing Weight                                         Center



                127.273, kg,                                         



                Priority: STAT,                                         



                Start date:                                         



                18                                         



                23:10:00 CDT,                                         



                Stop date:                                         



                18                                         



                23:10:00 CDT,                                         



                ABX Indication:                                         



                Bacteremia                                         



                                                                



                                                                

 

        Vancomycin  2001 mg:         Inactive                   [facility]



                infuse over 2.5                                 2018    Medical



                hours  For                                         Center



                adult patients                                         



                only: Round to                                         



                nearest 250 mg                                         



                per Medical                                         



                Staff approval                                         



                 *** MEDICATION                                         



                WASTE ***                                         



                Product Size:                                         



                1000 mg Product                                         



                Wasted:  ___ mg                                         



                                                                



                                                                

 

        normal saline 1,000 mL, Rate:         No Longer                   

 [place]



        0.9% IV 1,000 75 ml/hr,         Active                      Medical



        mL      Infuse over:                                         [place]



                13.3 hr, Route:                                         



                IV, Dosing                                         



                Weight 127.273                                         



                kg, Total                                         



                Volume: 1,000,                                         



                Start date:                                         



                18                                         



                22:39:00 CDT,                                         



                Duration: 30                                         



                day, Stop date:                                         



                18                                         



                22:38:00 CDT,                                         



                2.36, m2                                         



                                                                

 

        Acetaminophen Notes: Max         Inactive                    [facility]

as



                acetaminophen                                 2018    Medical



                4000 mg/day (4                                         Center



                gm/day).  (Same                                         



                as: Tylenol                                         



                Extra Strength)                                         



                                                                



                                                                

 

        Rocephin Notes: (Same         Inactive                   [facility]



                As: Rocephin).                                 2018    Medical



                 *** MEDICATION                                         Center



                WASTE ***                                         



                Product Size:                                         



                1000 mg Product                                         



                Wasted:  _0__                                         



                mg                                              



                                                                

 

        Morphine 4 mg, Route:         Inactive                 [facility] [place]



                IVP, ONCE,                                 2018    Medical



                Dosing Weight                                         Center



                127.273, kg,                                         



                Priority: STAT,                                         



                Start date:                                         



                18                                         



                19:05:00 CDT,                                         



                Stop date:                                         



                18                                         



                19:05:00 CDT                                         



                                                                



                                                                

 

        Benadryl Notes: (Same         Inactive                    [place]



                as: Benadryl)                                 2018    Medical



                                                                Center



                                                                



                                                                

 

        Benadryl Notes: (Same         Inactive                 [facility]



                as: Benadryl)                                 2018    Medical



                                                                Center



                                                                



                                                                

 

        Morphine 4 mg, 1 mL,         Inactive                    [place]



                Route: IVP,                                 2018    Medical



                Drug form:                                         Center



                SOLN, ONCE,                                         



                Dosing Weight                                         



                127.273, kg,                                         



                Priority: STAT,                                         



                Start date:                                         



                18                                         



                17:56:00 CDT,                                         



                Stop date:                                         



                18                                         



                17:56:00 CDT                                         



                                                                



                                                                







Allergies, Adverse Reactions, Alerts







        Substance Category Reaction Severity Reaction Status  Date    Comments S

ource



                                        type            Reported         



                                                                        



                                                                        



                                                                        

 

        amoxicillin Assertion                 Drug    Active                  [facility]

[facility]



                                        allergy                         [facility]



                                                                        



                                                                        



                                                                        

 

        morphine Assertion                 Drug    Active                  [facility]

er



                                        allergy                         [facility]



                                                                        



                                                                        



                                                                        

 

        Toradol Assertion                 Drug    Active                  [facility]

r



                                        allergy                         [facility]



                                                                        



                                                                        



                                                                        

 

        Minocin Assertion                 Drug    Active                  [facility]

r



                                        allergy                         [facility]



                                                                        



                                                                        



                                                                        

 

        Zofran  Assertion                 Drug    Active                  [facility]

r



                                        allergy                         Neuro



                                                                        



                                                                        



                                                                        

 

        Levaquin Assertion                 Drug    Active                  [facility]

er



                                        allergy                         Neuro



                                                                        



                                                                        



                                                                        

 

        Bactrim Assertion                 Drug    Active                  [facility]

r



                                        allergy                         Neuro



                                                                        



                                                                        



                                                                        







Immunizations







                                        No Data Provided for This Section







Results







        Order Name Results Value   Reference Date    Interpretation Comments [facility]

rce



                                Range                           



                                                                



                                                                



                                                                

 

        ERTAPENEM:S Culture: >100,000 CFU/mL Proteus mirabilis             

               [facility]:PT:ISOL Urine   10,000 -  50,000 CFU/mL Skin Mini         /    

               Medical



        ATE:ORDQN:M                                                 [place]



        IC                                                      



                                                                



                                                                

 

        ERTAPENEM:S Proteus Proteus                            [facility]:PT:ISOL mirabilis mirabilis         /                   Medical



        ATE:ORDQN:M                                                 Center



        IC                                                      



                                                                



                                                                

 

        URINE AND UA Nitrite Negative Negative                    [facility]



        STOOL           (18 10:48 AM)                            Medic

al



                                                                Center



                                                                



                                                                



                                                                

 

        URINE AND UA Bili Negative Negative                    [facility]



        STOOL           *NA*            /                   Medical



                        (18 10:48 AM)                                 [facility]

r



                                                                



                                                                



                                                                

 

        URINE AND UA Ketones Negative Negative                    [facility]



        STOOL           *NA*            /                   Medical



                        (18 10:48 AM)                                 [facility]

r



                                                                



                                                                



                                                                

 

        URINE AND UA Blood Trace   Negative                    [facility]



        STOOL           *ABN*           /                   Medical



                        (18 10:48 AM)                                 [place]

r



                                                                



                                                                



                                                                

 

        URINE AND UA      0.2     0.1 - 1.0                    [facility]



        STOOL   Urobilinogen                 /                   Medical



                                                                Center



                                                                



                                                                



                                                                

 

        URINE AND UA Leuk Est Large   Negative                    [facility]



        STOOL           *ABN*           /                   Medical



                        (18 10:48 AM)                                 [place]

r



                                                                



                                                                



                                                                

 

        URINE AND UA Protein Negative Negative                    [facility]



        STOOL           (18 10:48 AM)         /                   Medic

al



                                                                Center



                                                                



                                                                



                                                                

 

        URINE AND UA Glucose Negative Negative                    [facility]



        STOOL           (18 10:48 AM)         /                   [facility]



                                                                



                                                                



                                                                

 

        URINE AND UA pH   7.0     5.0 - 8.0                    [facility]                                              [facility]



                                                                



                                                                



                                                                

 

        URINE AND UA Spec Grav 1.015   <=1.030                    [facility]                                              [facility]



                                                                



                                                                



                                                                

 

        URINE AND UA Color Yellow  Yellow                     [facility]



        STOOL           *NA*            /                   Medical



                        (18 10:48 AM)                                 [facility]

r



                                                                



                                                                



                                                                

 

        URINE AND UA Turbidity Clear   Clear                      [facility]           (18 10:48 AM)                            [facility]



                                                                



                                                                



                                                                

 

        URINE AND UA Mucus Few /LPF None Seen                    [facility]                   /LPF    /                   [facility]



                                                                



                                                                



                                                                

 

        URINE AND UA Bacteria Few /HPF None Seen                    [facility]

s



        STOOL                   /HPF                       [facility]



                                                                



                                                                



                                                                

 

        URINE AND UA RBC  0-2 /HPF 0 - 2                      [facility]                                              [facility]



                                                                



                                                                



                                                                

 

        URINE AND UA Sq Epi None Seen Few                        [facility]           (18 10:48 AM)                            [facility]



                                                                



                                                                



                                                                

 

        URINE AND UA WBC    None Seen                    [facility]           /HPF    /HPF    /                   [facility]



                                                                



                                                                



                                                                

 

        BLOOD BANK Antibody Scrn Negative                            [facility]

as



        RESULTS         (18 5:57 AM)                            [facility]



                                                                



                                                                



                                                                

 

        BLOOD BANK ABO/Rh  AB POS                             [facility]



        RESULTS                         /                   [facility]



                                                                



                                                                



                                                                

 

        HEMATOLOGY PTT     33.4    22.9 -                     [facility]



                                35.8                       [facility]



                                                                



                                                                



                                                                

 

        HEMATOLOGY PT      13.7    12.0 -                     [facility]



                                14.7                       [facility]



                                                                



                                                                



                                                                

 

        HEMATOLOGY INR     1.05    0.85 -                     [facility] [place]



                                1.17                       [facility]



                                                                



                                                                



                                                                

 

        CHEM PANEL eGFR    133                        Result  [facility] [place]



                                                   Comment: The Medical



                                                        eGFR is Center



                                                        calculated 



                                                        using the 



                                                        CKD-EPI 



                                                        formula. In 



                                                        most young, 



                                                        healthy 



                                                        individuals 



                                                        the eGFR will 



                                                        be >90  



                                                        mL/min/1.73m2 



                                                        . The eGFR 



                                                        declines with 



                                                        age. An eGFR 



                                                        of 60-89 may 



                                                        be normal in 



                                                        some    



                                                        populations, 



                                                        particularly 



                                                        the elderly, 



                                                        for whom the 



                                                        CKD-EPI 



                                                        formula has 



                                                        not been 



                                                        extensively 



                                                        validated. 



                                                        Use of the 



                                                        eGFR is not 



                                                        recommended 



                                                        in the  



                                                        following 



                                                        populations:< 



                                                        br/><br/>Mary 



                                                        viduals with 



                                                        unstable 



                                                        creatinine 



                                                        concentration 



                                                        s, including 



                                                        pregnant 



                                                        patients and 



                                                        those with 



                                                        serious 



                                                        co-morbid 



                                                        conditions.<b 



                                                        r/><br/>Patie 



                                                        nts with 



                                                        extremes in 



                                                        muscle mass 



                                                        or diet. 



                                                        <br/><br/>The 



                                                        data above 



                                                        are obtained 



                                                        from the 



                                                        [employer] 



                                                        [place]  



                                                        Disease 



                                                        Education 



                                                        Program 



                                                        (NKDEP) which 



                                                        additionally 



                                                        recommends 



                                                        that when the 



                                                        eGFR is used 



                                                        in patients 



                                                        with extremes 



                                                        of body mass 



                                                        index for 



                                                        purposes of 



                                                        drug dosing, 



                                                        the eGFR 



                                                        should be 



                                                        multiplied by 



                                                        the estimated 



                                                        BMI.    



                                                                

 

        CHEM PANEL Calcium Lvl 9.4     8.5 - 10.5                     [facility]

as



                                        /2018                   [facility]



                                                                



                                                                



                                                                

 

        CHEM PANEL CO2     28      24 - 32                     [place]



                                                           [facility]



                                                                



                                                                



                                                                

 

        CHEM PANEL BUN     14      7 - 22                      [place]



                                        /2018                   [facility]



                                                                



                                                                



                                                                

 

        CHEM PANEL Glucose Lvl 89      70 - 99                     [place]



                                        /2018                   [facility]



                                                                



                                                                



                                                                

 

        CHEM PANEL Chloride Lvl 104     95 - 109                     a

s



                                                           [facility]



                                                                



                                                                



                                                                

 

        CHEM PANEL Potassium Lvl 4.2     3.5 - 5.1                     [place]



                                                           [facility]



                                                                



                                                                



                                                                

 

        CHEM PANEL Sodium Lvl 137     135 - 145                     [place]



                                        /2018                   [facility]



                                                                



                                                                



                                                                

 

        CHEM PANEL Creatinine 0.85    0.50 -                      [place]



                Lvl             1.40                       [facility]



                                                                



                                                                



                                                                

 

        CHEM PANEL AGAP    9.2     10.0 -                      [place]



                                20.0                       [facility]



                                                                



                                                                



                                                                

 

        HEMATOLOGY ACT (TEG) 136     86 - 118                     [place]



                Rapid                   /2018                   [facility]



                                                                



                                                                



                                                                

 

        HEMATOLOGY Split Point 0.6                                 [place]



                Rapid                   /2018                   [facility]



                                                                



                                                                



                                                                

 

        HEMATOLOGY R-time Rapid 0.9     0.4 - 0.7                     [facility]

as



                                        /2018                   [facility]



                                                                



                                                                



                                                                

 

        HEMATOLOGY K-time Rapid 1.4     0.6 - 2.3                     [facility]

[facility]



                                        /2018                   [facility]



                                                                



                                                                



                                                                

 

        HEMATOLOGY Angle Rapid 71      64 - 80                     [place]



                                                           [facility]



                                                                



                                                                



                                                                

 

        HEMATOLOGY G-value Rapid 12.7    5.0 - 11.6                     [place]

[place]



                                                           [facility]



                                                                



                                                                



                                                                

 

        HEMATOLOGY Max Amplitude 72      52 - 71                     [place]

[place]



                Rapid                                      [facility]



                                                                



                                                                



                                                                

 

        HEMATOLOGY Estimated % 0.1     0.0 - 7.5                     [place]

s



                Lysis Rapid                 /2018                   [facility]



                                                                



                                                                



                                                                

 

        HEMATOLOGY Platelet 367     133 - 450                     [place]



                                        /2018                   [facility]



                                                                



                                                                



                                                                

 

        HEMATOLOGY MPV     7.8     7.4 - 10.4                     [place]



                                                           [facility]



                                                                



                                                                



                                                                

 

        HEMATOLOGY MCH     27.4    27.0 -                      [place]



                                31.0                       [facility]



                                                                



                                                                



                                                                

 

        HEMATOLOGY MCV     80.7    80.0 -                      [place]



                                94.0                       [facility]



                                                                



                                                                



                                                                

 

        HEMATOLOGY MCHC    34.0    32.0 -                      [place]



                                36.0    /2018                   [facility]



                                                                



                                                                



                                                                

 

        HEMATOLOGY RDW     18.9    11.5 -  11                    [place]



                                14.5                       [facility]



                                                                



                                                                



                                                                

 

        HEMATOLOGY Hct     43.3    42.0 -  11                    [place]



                                54.0    /                   [facility]



                                                                



                                                                



                                                                

 

        HEMATOLOGY WBC     9.3     3.7 - 10.4                     [place]



                                        /2018                   [facility]



                                                                



                                                                



                                                                

 

        HEMATOLOGY Hgb     14.7    14.0 -                      [place]



                                18.0                       [facility]



                                                                



                                                                



                                                                

 

        HEMATOLOGY RBC     5.36    4.70 -  11                    [place]



                                6.10                       [facility] [facility]



                                                                



                                                                



                                                                

 

        HEMATOLOGY Eosinophils # 0.2     0.0 - 0.5                    [facility]

                   [facility]



                                                                



                                                                



                                                                

 

        HEMATOLOGY Basophils # 0.1     0.0 - 0.2                    [facility]

                   [facility]



                                                                



                                                                



                                                                

 

        HEMATOLOGY Lymphocytes # 1.8     1.0 - 5.5                    [facility]

                   [facility]



                                                                



                                                                



                                                                

 

        HEMATOLOGY Monocytes # 0.9     0.0 - 0.8                    [facility]

                   [facility]



                                                                



                                                                



                                                                

 

        HEMATOLOGY Neutrophils # 6.3     1.5 - 8.1                    [facility]

                   [facility]



                                                                



                                                                



                                                                

 

        HEMATOLOGY Eosinophils 2.0     0.0 - 4.0                     

                   [facility]



                                                                



                                                                



                                                                

 

        HEMATOLOGY Segs    67.4    45.0 -                      [place]



                                75.0                       [facility]



                                                                



                                                                



                                                                

 

        HEMATOLOGY Lymphocytes 19.9    20.0 -                      [place]



                                40.0                       [facility]



                                                                



                                                                



                                                                

 

        HEMATOLOGY Basophils 1.0     0.0 - 1.0                     [place]



                                        /2018                   [facility]



                                                                



                                                                



                                                                

 

        HEMATOLOGY Monocytes 9.7     2.0 - 12.0                     [place]



                                                           [facility]



                                                                



                                                                



                                                                

 

        CHEM PANEL B/C Ratio 17      6 - 25                      [place]



                                                           [facility]



                                                                



                                                                



                                                                

 

        CHEM PANEL Globulin 4.3     2.7 - 4.2                     [place]



                                        /2018                   [facility]



                                                                



                                                                



                                                                

 

        CHEM PANEL A/G Ratio 0.7     0.7 - 1.6                     [place]



                                        /2018                   [facility]



                                                                



                                                                



                                                                

 

        CHEM PANEL AGAP    14.4    10.0 -                      [place]



                                20.0                       [facility]



                                                                



                                                                



                                                                

 

        CHEM PANEL eGFR    113                        [facility] [place]



                                        /2018           Comment: [facility]



                                                        eGFR is Center



                                                        calculated 



                                                        using the 



                                                        CKD-EPI 



                                                        formula. In 



                                                        most young, 



                                                        healthy 



                                                        individuals 



                                                        the eGFR will 



                                                        be >90  



                                                        mL/min/1.73m2 



                                                        . The eGFR 



                                                        declines with 



                                                        age. An eGFR 



                                                        of 60-89 may 



                                                        be normal in 



                                                        some    



                                                        populations, 



                                                        particularly 



                                                        the elderly, 



                                                        for whom the 



                                                        CKD-EPI 



                                                        formula has 



                                                        not been 



                                                        extensively 



                                                        validated. 



                                                        Use of the 



                                                        eGFR is not 



                                                        recommended 



                                                        in the  



                                                        following 



                                                        populations:< 



                                                        br/><br/>Mary 



                                                        viduals with 



                                                        unstable 



                                                        creatinine 



                                                        concentration 



                                                        s, including 



                                                        pregnant 



                                                        patients and 



                                                        those with 



                                                        serious 



                                                        co-morbid 



                                                        conditions.<b 



                                                        r/><br/>Patie 



                                                        nts with 



                                                        extremes in 



                                                        muscle mass 



                                                        or diet. 



                                                        <br/><br/>The 



                                                        data above 



                                                        are obtained 



                                                        from the 



                                                        [employer] 



                                                        [place]  



                                                        Disease 



                                                        Education 



                                                        Program 



                                                        (NKDEP) which 



                                                        additionally 



                                                        recommends 



                                                        that when the 



                                                        eGFR is used 



                                                        in patients 



                                                        with extremes 



                                                        of body mass 



                                                        index for 



                                                        purposes of 



                                                        drug dosing, 



                                                        the eGFR 



                                                        should be 



                                                        multiplied by 



                                                        the estimated 



                                                        BMI.    



                                                                

 

        CHEM PANEL Alk Phos 76      39 - 136 05/                   [address]



                                                                



                                                                



                                                                

 

        CHEM PANEL ALT     35      0 - 65  05                   [address]



                                                                



                                                                



                                                                

 

        CHEM PANEL Albumin Lvl 2.8     3.5 - 5.0                    [address]



                                                                



                                                                



                                                                

 

        CHEM PANEL Total Protein 7.1     6.4 - 8.4                    [address]



                                                                



                                                                



                                                                

 

        CHEM PANEL Calcium Lvl 8.7     8.5 - 10.5                    [address]



                                                                



                                                                



                                                                

 

        CHEM PANEL AST     18      0 - 37  05/                   [address]



                                                                



                                                                



                                                                

 

        CHEM PANEL Bili Total 0.3     0.2 - 1.3                    [address]



                                                                



                                                                



                                                                

 

        CHEM PANEL Potassium Lvl 4.4     3.5 - 5.1                    [address]



                                                                



                                                                



                                                                

 

        CHEM PANEL Chloride Lvl 109     95 - 109 05/                   [address]



                                                                



                                                                



                                                                

 

        CHEM PANEL CO2     23      24 - 32 /                   [address]



                                                                



                                                                



                                                                

 

        CHEM PANEL Glucose Lvl 114     70 - 99                    [address]



                                                                



                                                                



                                                                

 

        CHEM PANEL Creatinine 1.01    0.50 -  05                   [facility] [place]



                Lvl             1.40    /                   [facility]



                                                                



                                                                



                                                                

 

        CHEM PANEL BUN     17      7 - 22  05/                   [address]



                                                                



                                                                



                                                                

 

        CHEM PANEL Sodium Lvl 142     135 - 145 05/                   [address]



                                                                



                                                                



                                                                

 

        HEMATOLOGY Basophils 0.6     0.0 - 1.0 /                   [address]



                                                                



                                                                



                                                                

 

        HEMATOLOGY Segs-Bands # 4.8     1.5 - 8.1                    [address]



                                                                



                                                                



                                                                

 

        HEMATOLOGY Monocytes # 0.7     0.0 - 0.8                    [address]



                                                                



                                                                



                                                                

 

        HEMATOLOGY Lymphocytes # 1.9     1.0 - 5.5 05                                       [facility]



                                                                



                                                                



                                                                

 

        HEMATOLOGY Monocytes 9.4     2.0 - 12.0                     [place]



                                        /2018                   [facility]



                                                                



                                                                



                                                                

 

        HEMATOLOGY Eosinophils # 0.2     0.0 - 0.5                                        [facility]



                                                                



                                                                



                                                                

 

        HEMATOLOGY Eosinophils 2.9     0.0 - 4.0                     [place]

[place]



                                        /                   [facility]



                                                                



                                                                



                                                                

 

        HEMATOLOGY Segs    62.2    45.0 -                      [place]



                                75.0                       [facility]



                                                                



                                                                



                                                                

 

        HEMATOLOGY Lymphocytes 24.9    20.0 -  05                    [place]



                                40.0                       [facility]



                                                                



                                                                



                                                                

 

        HEMATOLOGY MCH     27.5    27.0 -  05                    [place]



                                31.0                       [facility]



                                                                



                                                                



                                                                

 

        HEMATOLOGY MCV     85.3    80.0 -                      [place]



                                94.0                       [facility]



                                                                



                                                                



                                                                

 

        HEMATOLOGY Hct     43.9    42.0 -                      [place]



                                54.0                       [facility]



                                                                



                                                                



                                                                

 

        HEMATOLOGY Hgb     14.2    14.0 -                      [place]



                                18.0                       [facility]



                                                                



                                                                



                                                                

 

        HEMATOLOGY WBC     7.7     3.7 - 10.4                     [place]



                                        /2018                   [facility]



                                                                



                                                                



                                                                

 

        HEMATOLOGY RBC     5.15    4.70 -                      [place]



                                6.10                       [facility]



                                                                



                                                                



                                                                

 

        HEMATOLOGY MPV     8.4     7.4 - 10.4                     [place]



                                        /2018                   [facility]



                                                                



                                                                



                                                                

 

        HEMATOLOGY MCHC    32.3    32.0 -                      [place]



                                36.0                       [facility]



                                                                



                                                                



                                                                

 

        HEMATOLOGY RDW     17.3    11.5 -                      [place]



                                14.5                       [facility]



                                                                



                                                                



                                                                

 

        HEMATOLOGY Platelet 317     133 - 450                     [place]



                                                           [facility]



                                                                



                                                                



                                                                

 

        CHEM PANEL Globulin 4.4     2.7 - 4.2                     [place]



                                        /2018                   [facility]



                                                                



                                                                



                                                                

 

        CHEM PANEL A/G Ratio 0.6     0.7 - 1.6                     [place]



                                        /2018                   [facility]



                                                                



                                                                



                                                                

 

        CHEM PANEL B/C Ratio 17      6 - 25                      [place]



                                                           [facility]



                                                                



                                                                



                                                                

 

        CHEM PANEL AGAP    11.3    10.0 -                      [place]



                                20.0                       [facility]



                                                                



                                                                



                                                                

 

        CHEM PANEL eGFR    134             05           Result   [place]



                                        /2018           Comment: [facility]



                                                        eGFR is Center



                                                        calculated 



                                                        using the 



                                                        CKD-EPI 



                                                        formula. In 



                                                        most young, 



                                                        healthy 



                                                        individuals 



                                                        the eGFR will 



                                                        be >90  



                                                        mL/min/1.73m2 



                                                        . The eGFR 



                                                        declines with 



                                                        age. An eGFR 



                                                        of 60-89 may 



                                                        be normal in 



                                                        some    



                                                        populations, 



                                                        particularly 



                                                        the elderly, 



                                                        for whom the 



                                                        CKD-EPI 



                                                        formula has 



                                                        not been 



                                                        extensively 



                                                        validated. 



                                                        Use of the 



                                                        eGFR is not 



                                                        recommended 



                                                        in the  



                                                        following 



                                                        populations:< 



                                                        br/><br/>Mary 



                                                        viduals with 



                                                        unstable 



                                                        creatinine 



                                                        concentration 



                                                        s, including 



                                                        pregnant 



                                                        patients and 



                                                        those with 



                                                        serious 



                                                        co-morbid 



                                                        conditions.<b 



                                                        r/><br/>Patie 



                                                        nts with 



                                                        extremes in 



                                                        muscle mass 



                                                        or diet. 



                                                        <br/><br/>The 



                                                        data above 



                                                        are obtained 



                                                        from the 



                                                        [employer] 



                                                        [place]  



                                                        Disease 



                                                        Education 



                                                        Program 



                                                        (NKDEP) which 



                                                        additionally 



                                                        recommends 



                                                        that when the 



                                                        eGFR is used 



                                                        in patients 



                                                        with extremes 



                                                        of body mass 



                                                        index for 



                                                        purposes of 



                                                        drug dosing, 



                                                        the eGFR 



                                                        should be 



                                                        multiplied by 



                                                        the estimated 



                                                        BMI.    



                                                                

 

        CHEM PANEL Creatinine 0.84    0.50 -                     [facility] [place]



                Lvl             1.40                       [facility]



                                                                



                                                                



                                                                

 

        CHEM PANEL Sodium Lvl 142     135 - 145                    [address]



                                                                



                                                                



                                                                

 

        CHEM PANEL Glucose Lvl 99      70 - 99                    [address]



                                                                



                                                                



                                                                

 

        CHEM PANEL BUN     14      7 - 22                     [address]



                                                                



                                                                



                                                                

 

        CHEM PANEL Alk Phos 79      39 - 136                    [address]



                                                                



                                                                



                                                                

 

        CHEM PANEL Bili Total 0.3     0.2 - 1.3                    [address]



                                                                



                                                                



                                                                

 

        CHEM PANEL AST     14      0 - 37                     [address]



                                                                



                                                                



                                                                

 

        CHEM PANEL ALT     43      0 - 65                     [address]



                                                                



                                                                



                                                                

 

        CHEM PANEL Total Protein 7.1     6.4 - 8.4                    [address]



                                                                



                                                                



                                                                

 

        CHEM PANEL Albumin Lvl 2.7     3.5 - 5.0                    [facility]                   [facility]



                                                                



                                                                



                                                                

 

        CHEM PANEL Calcium Lvl 9.2     8.5 - 10.5                    [facility]

as



                                        /2018                   [facility]



                                                                



                                                                



                                                                

 

        CHEM PANEL CO2     21      24 - 32                    [address]



                                                                



                                                                



                                                                

 

        CHEM PANEL Potassium Lvl 4.3     3.5 - 5.1                    [address]



                                                                



                                                                



                                                                

 

        CHEM PANEL Chloride Lvl 114     95 - 109                    [facility]                   [facility]



                                                                



                                                                



                                                                

 

        HEMATOLOGY MCHC    32.5    32.0 -                     [facility] [place]



                                36.0                       [facility]



                                                                



                                                                



                                                                

 

        HEMATOLOGY RDW     17.5    11.5 -                     [facility] [place]



                                14.5                       [facility]



                                                                



                                                                



                                                                

 

        HEMATOLOGY Platelet 400     133 - 450                    [address]



                                                                



                                                                



                                                                

 

        HEMATOLOGY MPV     8.5     7.4 - 10.4                    [address]



                                                                



                                                                



                                                                

 

        HEMATOLOGY WBC     6.6     3.7 - 10.4                     [place]



                                        /2018                   [facility]



                                                                



                                                                



                                                                

 

        HEMATOLOGY RBC     5.17    4.70 -                      [place]



                                6.10                       [facility]



                                                                



                                                                



                                                                

 

        HEMATOLOGY MCV     86.1    80.0 -                     [facility] [place]



                                94.0                       [facility]



                                                                



                                                                



                                                                

 

        HEMATOLOGY Hct     44.5    42.0 -                      [place]



                                54.0                       [facility]



                                                                



                                                                



                                                                

 

        HEMATOLOGY MCH     28.0    27.0 -                      [place]



                                31.0                       [facility]



                                                                



                                                                



                                                                

 

        HEMATOLOGY Hgb     14.5    14.0 -                      [place]



                                18.0                       [facility]



                                                                



                                                                



                                                                

 

        HEMATOLOGY Lymphocytes # 1.7     1.0 - 5.5                    [facility]



                                                           [facility]



                                                                



                                                                



                                                                

 

        HEMATOLOGY Monocytes # 0.7     0.0 - 0.8                    [facility]



                                                           [facility]



                                                                



                                                                



                                                                

 

        HEMATOLOGY Eosinophils # 0.2     0.0 - 0.5                    [facility]



                                        [address] [place] [address]



                                                                



                                                                



                                                                

 

        HEMATOLOGY Lymphocytes 26.1    20.0 -                      [place]



                                40.0                       [facility]



                                                                



                                                                



                                                                

 

        HEMATOLOGY Segs    59.3    45.0 -                      [place]



                                75.0                       [facility]



                                                                



                                                                



                                                                

 

        HEMATOLOGY Basophils 0.8     0.0 - 1.0                     [place]



                                        /2018                   [facility]



                                                                



                                                                



                                                                

 

        HEMATOLOGY Monocytes 10.5    2.0 - 12.0                    [facility] [place]



                                                           [facility]



                                                                



                                                                



                                                                

 

        HEMATOLOGY Eosinophils 3.3     0.0 - 4.0                    [facility]



                                                           [facility]



                                                                



                                                                



                                                                

 

        HEMATOLOGY Segs-Bands # 3.9     1.5 - 8.1                    [facility]

as



                                        /2018                   [facility]



                                                                



                                                                



                                                                

 

        TOXICOLOGY Vanco Tr TND 15:30pm                            [address]



                                                                



                                                                



                                                                

 

        TOXICOLOGY Vanco Tr 9.1                                 [place]



                                        /2018                   [facility]



                                                                



                                                                



                                                                

 

        CHEM PANEL Glucose Lvl 74      70 - 99                     [place]



                                        /2018                   [facility]



                                                                



                                                                



                                                                

 

        CHEM PANEL BUN     12      7 - 22                     [address]



                                                                



                                                                



                                                                

 

        CHEM PANEL Creatinine 0.75    0.50 -  05                   [facility] [place]



                Lvl             1.40                       [facility]



                                                                



                                                                



                                                                

 

        CHEM PANEL eGFR    140             05           [facility] [place]



                                        /2018           Comment: [facility]



                                                        e[facility] is Center



                                                        calculated 



                                                        using the 



                                                        CKD-EPI 



                                                        formula. In 



                                                        most young, 



                                                        healthy 



                                                        individuals 



                                                        the eGFR will 



                                                        be >90  



                                                        mL/min/1.73m2 



                                                        . The eGFR 



                                                        declines with 



                                                        age. An eGFR 



                                                        of 60-89 may 



                                                        be normal in 



                                                        some    



                                                        populations, 



                                                        particularly 



                                                        the elderly, 



                                                        for whom the 



                                                        CKD-EPI 



                                                        formula has 



                                                        not been 



                                                        extensively 



                                                        validated. 



                                                        Use of the 



                                                        eGFR is not 



                                                        recommended 



                                                        in the  



                                                        following 



                                                        populations:< 



                                                        br/><br/>[facility] 



                                                        viduals with 



                                                        unstable 



                                                        creatinine 



                                                        concentration 



                                                        s, including 



                                                        pregnant 



                                                        patients and 



                                                        those with 



                                                        serious 



                                                        co-morbid 



                                                        conditions.<b 



                                                        r/><br/>Patie 



                                                        nts with 



                                                        extremes in 



                                                        muscle mass 



                                                        or diet. 



                                                        <br/><br/>The 



                                                        data above 



                                                        are obtained 



                                                        from the 



                                                        [employer] 



                                                        [place]  



                                                        Disease 



                                                        Education 



                                                        Program 



                                                        (NKDEP) which 



                                                        additionally 



                                                        recommends 



                                                        that when the 



                                                        eGFR is used 



                                                        in patients 



                                                        with extremes 



                                                        of body mass 



                                                        index for 



                                                        purposes of 



                                                        drug dosing, 



                                                        the eGFR 



                                                        should be 



                                                        multiplied by 



                                                        the estimated 



                                                        BMI.    



                                                                

 

        CHEM PANEL Albumin Lvl 2.9     3.5 - 5.0 05/                   [facility]                   [facility]



                                                                



                                                                



                                                                

 

        CHEM PANEL Globulin 4.4     2.7 - 4.2                    [address]



                                                                



                                                                



                                                                

 

        CHEM PANEL A/G Ratio 0.7     0.7 - 1.6                    [address]



                                                                



                                                                



                                                                

 

        CHEM PANEL Bili Total 0.4     0.2 - 1.3                    [address]



                                                                



                                                                



                                                                

 

        CHEM PANEL Alk Phos 71      39 - 136 05/                   [address]



                                                                



                                                                



                                                                

 

        CHEM PANEL AST     15      0 - 37  05/                   [address]



                                                                



                                                                



                                                                

 

        CHEM PANEL ALT     28      0 - 65  05/                   [address]



                                                                



                                                                



                                                                

 

        CHEM PANEL Potassium Lvl 4.4     3.5 - 5.1                    [address]



                                                                



                                                                



                                                                

 

        CHEM PANEL Sodium Lvl 147     135 - 145 05/                   [address]



                                                                



                                                                



                                                                

 

        CHEM PANEL CO2     25      24 - 32 05                   [address]



                                                                



                                                                



                                                                

 

        CHEM PANEL Chloride Lvl 114     95 - 109                    [address]



                                                                



                                                                



                                                                

 

        CHEM PANEL B/C Ratio 16      6 - 25  /                   [address]



                                                                



                                                                



                                                                

 

        CHEM PANEL Calcium Lvl 8.7     8.5 - 10.5                    [address]



                                                                



                                                                



                                                                

 

        CHEM PANEL AGAP    12.4    10.0 -  05/                   [facility] [place]



                                20.0                       [facility]



                                                                



                                                                



                                                                

 

        CHEM PANEL Total Protein 7.3     6.4 - 8.4                    [address]



                                                                



                                                                



                                                                

 

        HEMATOLOGY Platelet 345     133 - 450 05/                   [address]



                                                                



                                                                



                                                                

 

        HEMATOLOGY MPV     8.7     7.4 - 10.4                    [address]



                                                                



                                                                



                                                                

 

        HEMATOLOGY WBC     6.9     3.7 - 10.4                     [place]



                                        /2018                   [facility]



                                                                



                                                                



                                                                

 

        HEMATOLOGY RBC     5.30    4.70 -  05                    [place]



                                6.10                       [facility]



                                                                [place]



                                                                



                                                                



                                                                

 

        HEMATOLOGY MCHC    32.8    32.0 -  05                    [place]



                                36.0                       [facility]



                                                                



                                                                



                                                                

 

        HEMATOLOGY MCH     27.9    27.0 -                      [place]



                                31.0                       [facility]



                                                                



                                                                



                                                                

 

        HEMATOLOGY Hgb     14.8    14.0 -                      [place]



                                18.0                       [facility]



                                                                



                                                                



                                                                

 

        HEMATOLOGY MCV     85.0    80.0 -  05                    [place]



                                94.0                       [facility]



                                                                



                                                                



                                                                

 

        HEMATOLOGY Hct     45.1    42.0 -                      [place]



                                54.0                       [facility]



                                                                



                                                                



                                                                

 

        HEMATOLOGY RDW     17.5    11.5 -  05                    [place]



                                14.5                       [facility]



                                                                



                                                                



                                                                

 

        HEMATOLOGY Eosinophils # 0.2     0.0 - 0.5 05                   [facility]

[place]



                                                           [facility]



                                                                



                                                                



                                                                

 

        HEMATOLOGY Lymphocytes # 2.0     1.0 - 5.5                    [facility]

[place]



                                                           [facility]



                                                                



                                                                



                                                                

 

        HEMATOLOGY Monocytes # 0.7     0.0 - 0.8                    [facility]

                   [facility]



                                                                



                                                                



                                                                

 

        HEMATOLOGY Eosinophils 2.4     0.0 - 4.0 05                   [facility]



                                                           [facility]



                                                                



                                                                



                                                                

 

        HEMATOLOGY Basophils 0.6     0.0 - 1.0 05                    [place]



                                        /2018                   [facility]



                                                                



                                                                



                                                                

 

        HEMATOLOGY Segs-Bands # 4.0     1.5 - 8.1                     [place]

[place]



                                        /2018                   [facility]



                                                                



                                                                



                                                                

 

        HEMATOLOGY Monocytes 10.4    2.0 - 12.0                     [place]



                                        /2018                   [facility]



                                                                



                                                                



                                                                

 

        HEMATOLOGY RBC Morph Normal                             [facility] [place]



                        (18 2:07 AM)                            [facility]



                                                                



                                                                



                                                                

 

        HEMATOLOGY Segs    58.1    45.0 -                      [place]



                                75.0                       [facility]



                                                                



                                                                



                                                                

 

        HEMATOLOGY Plt Morph Normal                             [facility] [place]



                        (18 2:07 AM)                            [facility]



                                                                



                                                                



                                                                

 

        HEMATOLOGY Lymphocytes 28.5    20.0 -                      [place]



                                40.0                       [facility]



                                                                



                                                                



                                                                

 

        HEMATOLOGY Basophils # 0.1     0.0 - 0.2                    [facility]



                                                           [facility]



                                                                



                                                                



                                                                

 

        HEMATOLOGY Polychrom Moderate None Seen                    [facility] [place]



                        *ABN*           /                   [facility]



                        (18 11:07 AM)                                 [place]



                                                                



                                                                



                                                                

 

        TOXICOLOGY Vanco Tr TND *               05                    [place]



                                        /2018                   [facility]



                                                                



                                                                



                                                                

 

        TOXICOLOGY Vanco Tr 22.3            05                    [place]



                                        /2018                   [facility]



                                                                



                                                                



                                                                

 

        CHEM PANEL Magnesium Lvl 2.3     1.8 - 2.4                     [place]

[place]



                                        /                   [facility]



                                                                



                                                                



                                                                

 

        CHEM PANEL Phosphorus 3.5     2.5 - 4.5                     [place]



                                        /2018                   [facility]



                                                                



                                                                



                                                                

 

        HEMATOLOGY PT      14.0    12.0 -                      [place]



                                14.7                       [facility]



                                                                



                                                                



                                                                

 

        HEMATOLOGY PTT     37.6    22.9 -                      [place]



                                35.8                       [facility]



                                                                



                                                                



                                                                

 

        HEMATOLOGY INR     1.08    0.85 -                      [place]



                                1.17                       [facility]



                                                                



                                                                



                                                                

 

        IMMUNOLOGY C-REACTIVE 13.1    <=2.9 mg/L                     [place]

[place]



                PROTEIN                                    [facility]



                                                                



                                                                



                                                                

 

        IMMUNOLOGY Prealbumin 25.2    18.0 -                      [place]



                                45.0                       [facility]



                                                                



                                                                



                                                                

 

        CHEM PANEL Lactic Acid 0.9     0.5 - 2.2                     [place]

[place]



                Lvl                     /                   [facility]



                                                                



                                                                



                                                                

 

        HEMATOLOGY Sed Rate 5       0 - 15                      [place]



                                        /2018                   [facility]



                                                                



                                                                



                                                                

 

        IMMUNOLOGY C-REACTIVE 15.8    <=2.9 mg/L                     [place]

[place]



                PROTEIN                                    [facility]



                                                                



                                                                



                                                                

 

        HEMATOLOGY PT      13.0    12.0 -                      [place]



                                14.7                       [facility]



                                                                



                                                                



                                                                

 

        HEMATOLOGY INR     0.98    0.85 -                      [place]



                                1.17                       [facility]



                                                                



                                                                



                                                                

 

        HEMATOLOGY PTT     33.2    22.9 -                      [place]



                                35.8                       [facility]



                                                                



                                                                



                                                                

 

        BLOOD BANK Antibody Scrn Negative                            [facility]

[place]



        RESULTS         (5/3/18 6:51 PM)         /                   [facility]



                                                                



                                                                



                                                                

 

        BLOOD BANK ABO/Rh  AB POS                             [facility] [place]



        RESULTS                         /                   [facility]



                                                                



                                                                



                                                                

 

        URINE AND UA      0.2     0.1 - 1.0                    [facility]



        STOOL   Urobilinogen                 /                   [facility]



                                                                



                                                                



                                                                

 

        URINE AND UA Nitrite Negative Negative                    [facility]



        STOOL           (5/3/18 6:35 PM)         /                   [facility]



                                                                



                                                                



                                                                

 

        URINE AND UA Glucose Negative Negative                    [facility]



        STOOL           (5/3/18 6:35 PM)         /                   [facility]



                                                                



                                                                



                                                                

 

        URINE AND UA Ketones Negative Negative                    [facility]



        STOOL           *NA*            /                   [facility]



                        (5/3/18 6:35 PM)                                 [place]



                                                                



                                                                



                                                                

 

        URINE AND UA Bili Negative Negative                    [facility]



        STOOL           *NA*            /                   [facility]



                        (5/3/18 6:35 PM)                                 Center



                                                                



                                                                



                                                                

 

        URINE AND UA Blood Trace   Negative                    [facility]



        STOOL           *ABN*           /                   [facility]



                        (5/3/18 6:35 PM)                                 [place]



                                                                



                                                                



                                                                

 

        URINE AND UA Leuk Est Small   Negative                    [facility]           *ABN*           /                   [facility]



                        (5/3/18 6:35 PM)                                 [place]



                                                                



                                                                



                                                                

 

        URINE AND UA Spec Grav 1.020   <=1.030                    [facility]                           [address] [place] [address]



                                                                



                                                                



                                                                

 

        URINE AND UA pH   6.0     5.0 - 8.0                    [address]



                                                                



                                                                



                                                                

 

        URINE AND UA Color Yellow  Yellow                     [facility]           *NA*            /                   [facility]



                        (5/3/18 6:35 PM)                                 [place]



                                                                



                                                                



                                                                

 

        URINE AND UA Protein Trace   Negative                    [facility]           *ABN*                              [facility]



                        (5/3/18 6:35 PM)                                 [place]



                                                                



                                                                



                                                                

 

        URINE AND UA Turbidity Slight Cloudy Clear                      [facility]           (5/3/18 6:35 PM)         /                   [facility]



                                                                



                                                                



                                                                

 

        URINE AND UA Hyal Cast 0-2     0 - 2                      [facility]           (5/3/18 6:35 PM)         [address] [place] [address]



                                                                



                                                                



                                                                

 

        URINE AND UA Bacteria Occasional None Seen                     [place]

[place]



        STOOL           /HPF    /HPF                       [facility]



                                                                



                                                                



                                                                

 

        URINE AND UA RBC  11-20 /HPF 0 - 2                      [address]



                                                                



                                                                



                                                                

 

        URINE AND UA Sq Epi Occasional Few /LPF                    [facility]



        STOOL           /LPF            /                   [facility]



                                                                



                                                                



                                                                

 

        URINE AND UA WBC    None Seen                    [facility]



        STOOL           /HPF    /HPF    /[address] [place] [address]



                                                                



                                                                



                                                                

 

        HEMATOLOGY Basophils # 0.1     0.0 - 0.2                     [place]

[place]



                                                           [facility]



                                                                



                                                                



                                                                

 

        HEMATOLOGY Polychrom Slight                             [address]



                                                                



                                                                



                                                                

 

        HEMATOLOGY Plt Morph Normal                             [facility]



                        (5/3/18 6:20 PM)         [address]



                                                                



                                                                



                                                                







Pathology Reports







                                        No Data Provided for This Section







Diagnostic Reports







                Report          Value           Date            Source



                                                                

 

                    Brain-Outside Consult EXAM: CT BRAIN WITHOUT CONTRAST -- OUT

SIDE CONSULT 

2018                              [facility]



                CT              DATE: 2018 4:49 AM CST                 Cent

er



                                INDICATION: ' - PAIN'                 



                                COMPARISON: Noncontrast head CTs 2018, , 2013                 



                                                    TECHNIQUE: Noncontrast [facility] CT submitted for 2nd interpretation. 

205 images. Imaging was performed at [facility] on 
2018.                                         



                                IV contrast: None.                 



                                FINDINGS:                       



                                                    Overall unchanged exam. Righ

t transfrontal ventriculostomy catheter is 

unchanged in position. 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 
size and configuration. The abandoned right transparie                          

 



                                hudson catheter is unchanged. Stigmata of Chiari II

 malformation.                 



                                There is no intracranial hemorrhage or extra-axi

al collection.                 



                                                    No new parenchymal density a

bnormality. No mass or mass effect. Unchanged from 

the tonsillar herniation.                           



                                The density of the larger intracranial sinuses i

s normal.                 



                                                                



                                IMPRESSION: Unchanged examination compared to                  



                                        The final report agrees with the prelimi

nary report by the radiology resident. 

                                        



                                                                

 

                Brain shunt series DX EXAM: SKULL 2 VIEWS 2018       [facility]



                                EXAM: CHEST 2 VIEWS                 Center



                                EXAM: ABDOMEN 2 VIEWS                 



                                DATE: 5/3/2018 7:20 PM CDT                 



                                INDICATION: Headache. - Please include Codman vi

ew for shunt setting.                 



                                COMPARISON: Brain shuntogram 2013          

       



                                TECHNIQUE: AP and lateral views of the skull, ch

est and abdomen.                 



                                FINDINGS:                       



                                                    There is a single intact red

nt tube with the proximal aspect in the right 

frontal calvarium coursing across midline to the left anterior chest and abdomen
with the tip coiled within the pelvis.                           



                                                                



                                                    A right parietal shunt with 

distal tip in the right lateral abdomen is 

discontinuous. Another shunt present with proximal tip in the right neck base 
and distal tip in the medial mid abdomen                           



                                                    Cholecystectomy clips are no

huma. The lungs are clear but underinflated. 

Appearance of the pelvis is unchanged with bilateral shallow acetabular roofs. 
No obvious posterior dislocation. Spinal dysraphism                           



                                 is seen with absence of the spinous process fro

m L3 to S1.                 



                                                                



                                IMPRESSION:                     



                                                                



                                                    1. No significant change. Th

e right transfrontal shunt catheter is intact along

its course with the distal tip in the pelvis.                           



                                                    2. Discontinuous right parie

to-occipital catheter and 2 discontinuous catheters

in the right chest and abdomen are unchanged.                           



                                3. Spinal dysraphism.                 



                                                                

 

                Brain wo contrast CT EXAM: CT BRAIN WITHOUT CONTRAST 2018 

     [facility]



                                DATE: 5/3/2018 627 PM CDT                 Center



                                INDICATION: 32-year-old male patient with histor

y of  shunt malfunction                 



                                COMPARISON: CT of the brain 2015, 2013

.                 



                                                    TECHNIQUE: Axial CT images o

f the brain were obtained. Sagittal and coronal 

reformats.                                          



                                IV contrast: None.                 



                                FINDINGS:                       



                                                    An orphaned right occipital 

approach  shunt is present. Also present is a 

right frontal approach  shunt with tip in the left lateral ventricle.         

                  



                                Narrowing of the lateral ventricles is present, 

unchanged from 2015.                 



                                There is mild uncal and cerebellar tonsillar her

niation, also unchanged.                 



                                No recent hemorrhage or territorial infarct. No 

mass. No midline shift.                 



                                No scalp fluid collections.                 



                                Sinuses are clear.                 



                                IMPRESSION:                     



                                No acute intracranial abnormality.              

   



                                Adequately decompressed ventricular system.     

            



                                                                

 

                Chest 1view DX  EXAM: XR CHEST 1 VIEW 2018      [facility]

edical



                                DATE: 5/3/2018 6:12 PM CDT                 [facility]

r



                                                                



                                INDICATION:  - picc line use                 



                                UT SECTION: ER                  



                                COMPARISON: None.                 



                                TECHNIQUE: AP chest                 



                                FINDINGS:                       



                                Lines, tubes and hardware:                 



                                Left PICC tip at mid SVC.                 



                                                    Lungs and pleura: No pulmona

ry or pleural based abnormality is identified. 

Pulmonary vascularity is normal.                           



                                                    Heart and mediastinum: The h

eart size is normal for technique. The mediastinal 

contours are normal.                                



                                Bones: No acute bony abnormality is identified. 

                



                                Upper abdomen: Normal.                 



                                                                



                                IMPRESSION:                     



                                Left PICC tip at mid SVC.                 



                                No acute cardiopulmonary abnormality is observed

.                 



                                                                







Consultation Notes







                                        No Data Provided for This Section







Discharge Summaries







                                        No Data Provided for This Section







History and Physicals







                                        No Data Provided for This Section







Vital Signs







             Vital Sign   Value        Date         Comments     Source



                                                                 

 

             Respitory Rate 16           2018                [facility]



                                                                 

 

             Systolic (mm Hg) 138          2018                [facility]



                                                                 

 

             Diastolic (mm Hg) 68           2018                [facility]



                                                                 

 

             Temperature Oral (F) 98.4 F       2018                [facility]



                                                                 

 

             Temperature Oral (F) 98.6 F       2018                [facility]



                                                                 

 

             Systolic (mm Hg) 134          2018                [facility]



                                                                 

 

             Diastolic (mm Hg) 67           2018                [facility]



                                                                 

 

             Respitory Rate 18           2018                [facility]



                                                                 

 

             Systolic (mm Hg) 131          2018                [facility]



                                                                 

 

             Diastolic (mm Hg) 62           2018                [facility]



                                                                 

 

             Respitory Rate 16           2018                [facility]



                                                                 

 

             BMI Calculated 58.7         2018                [facility]



                                                                 

 

             Height       149.86 cm    2018                [facility]



                                                                 

 

             Weight       131.818      2018                [facility]



                                                                 

 

             Heart Rate   74           2018                [facility]

l



                                                                 Center



                                                                 

 

             Temperature Oral (F) 97.9 F       2018                [facility]



                                                                 

 

             Temperature Oral (F) 97.2 F       2018                [facility]



                                                                 Center



                                                                 

 

             Systolic (mm Hg) 127          2018                [facility]

dical



                                                                 Center



                                                                 

 

             Diastolic (mm Hg) 85           2018                [facility]



                                                                 

 

             Heart Rate   81           2018                [facility]

l



                                                                 Center



                                                                 

 

             Respitory Rate 18           2018                [facility]



                                                                 

 

             Heart Rate   90           2018                [facility]

l



                                                                 Center



                                                                 

 

             Systolic (mm Hg) 106          2018                [facility]

dical



                                                                 Center



                                                                 

 

             Diastolic (mm Hg) 71           2018                [facility]

edical



                                                                 Center



                                                                 

 

             Respitory Rate 18           2018                [facility]



                                                                 

 

             Temperature Oral (F) 98.1 F       2018                [facility]



                                                                 

 

             Respitory Rate 18           2018                [facility]



                                                                 

 

             Systolic (mm Hg) 168          2018                [facility]

dical



                                                                 Center



                                                                 

 

             Diastolic (mm Hg) 107          2018                [facility]



                                                                 

 

             Heart Rate   87           2018                [facility]

[facility]



                                                                 

 

             Temperature Oral (F) 98.1 F       2018                [facility]



                                                                 

 

             Weight       127.027      2018                [facility]

l



                                                                 Center



                                                                 

 

             Weight       127.027      2018                [facility]

l



                                                                 Center



                                                                 

 

             Weight       127.027      2018                [facility]

[facility]



                                                                 

 

             BMI Calculated 48.16        2018                [facility]



                                                                 

 

             Height       162.56 cm    2018                [facility]

[facility]



                                                                 

 

             Height       149.86 cm    2018                [facility]

[facility]



                                                                 

 

             BMI Calculated 56.67        2018                [facility]



                                                                 







Encounters







       Location Location Encounter Encounter Reason Attending ADM    DC     Stat

us Source



              Details Type   Number For    Provider Date   Date          



                                   Visit                              



                                                                      



                                                                      



                                                                      

 

       [facility] [id number]        [place]        

  [facility]



       Jose More          [facility]



                                                                      



                                                                      



                                                                      

 

       [facility]        Emergency [id number]        Peter         

 [facility]                             Saw /2018         [facility]



                                                                      



                                                                      



                                                                      

 

       [facility]           Phone  [phone]                        [facility]

her



       Neurosurgery        Message                      /2019         [facility]

ro



       [facility]                                                            



                                                                      



                                                                      



                                                                      

 

       [facility]           Phone  [phone]               07/15  07/17         Misc

her



       Neurosurgery        Message                      /2019         [facility]

ro



       [facility]                                                            



                                                                      



                                                                      



                                                                      

 

       [facility]           Phone  [phone]                        [facility]

her



       Neurosurgery        Message                      /2019         [facility]



       [facility]                                                            



                                                                      



                                                                      



                                                                      

 

       [facility]           Phone  [phone]                        [facility]

her



       Neurosurgery        Message                      /2019         [facility]

orly



       [facility]                                                            



                                                                      



                                                                      



                                                                      







Procedures







           Procedure  Code       Date       Perfomer   Comments   Source



                                                                  



                                                                  

 

           Cholecystectomy [id number]                                    [facility]



                                                                  Neuro,[facility]



                                                                  

 

           Shunt of cerebral [id number]                                    [facility]



           ventricle to                                             Neuro,



           extracranial site                                             [facility]



                                                                  







Assessment and Plan







                    Assessment and Plan Date                Source



                                                            

 

                    Extracted from:Title: Ophthalmology Consultation 2018 

         [facility]



                    Author: Jazmyn Turner MD                     



                    Date: 18                           



                    CONSULTATION - OPHTHALMOLOGY                     



                    PATIENT NAME: Jordan CARDONA MR #: [id number]                       



                    ROOM:ED                                 



                    REQUESTING TEAM/ATTENDING: NS                     



                    DATE OF CONSULT: 2018                     



                    CONSULTING ATTENDING: Alla Goldberg, MD                     



                    CONSULTING RESIDENT: Jazmyn Turner MD                     



                    REASON FOR CONSULT: Evaluate for disc edema                 

    



                    CHART REVIEWED: YES                     



                                        HISTORY OF PRESENT ILLNESS: The patient 

is a patient with PMH of spina bifida 

with  shunt with several week history of severe headaches. Ophthalmology 
consultated to rule out disc edema.                           



                    REVIEW OF SYSTEMS:                      



                    CONSTITUTIONAL: Denies fever, weight changes.               

      



                    MUSCULOSKELETAL: Denies generalized pain                    

 



                    SKIN: Denies rash.                      



                    EYES: As above.                         



                    ENT: Denies rhinorrhea, sore throat or hearing loss         

            



                    RESPIRATORY: Denies SOB.                     



                    CARDIOVASCULAR: Denies chest pain.                     



                    GASTROINTESTINAL: Denies nausea, vomiting, diarrhea.        

             



                    HEMATOPOETIC/LYMPHATIC: Denies bruising, LAD.               

      



                    GENITOURINARY: Denies change in UOP.                     



                    NEUROLOGICAL: Denies headache.                     



                    PSYCHIATRIC: Denies behavioral change.                     



                    ALLERGY/IMMUNE SYSTEM: Denies allergies.                    

 



                    PAST OCULAR HISTORY: per HPI                     



                    PAST MEDICAL HISTORY: per HPI                     



                    PAST SURGICAL HISTORY: no past ocular surgeries             

        



                    SOCIAL HISTORY: no etoh/smoking/drugs                     



                    FAMILY HISTORY: no ocular history                     



                    ALLERGIES: nkda                         



                    MEDICATIONS: none                       



                    EYE MEDICATION: none                     



                    EXAMINATION                             



                    NEURO/MS                                



                                        The patient is Alert but drowsy, no foca

l neurological or motor deficits, the 

patient was able to participate fully in the exam                           



                    VISUAL ACUITY (WITHOUT CORRECTION), TESTED ON A NEAR CARD   

                  



                    Right: 20/40 ( poor effort 2/2 severe headaches and drowsine

ss)                     



                    Left:  20/40 ( poor effort 2/2 severe headaches and drowsine

ss)                     



                    EXTRAOCULAR MOTILITY                     



                    Right: Full                             



                    Left: Full                              



                    CONFRONTATION VISUAL FIELD                     



                    Right: Full                             



                    Left: Full                              



                    COLOR SATURATION                        



                    Patient had no decrease in red color intensity between eyes 

                    



                    Right:   [address]                     



                    Left:  [address]                     



                    PUPILS                                  



                    Right: 4mm to 2mm, No afferent pupillary defect noted       

              



                    Left:  4mm to 2mm, No afferent pupillary defect noted       

              



                    INTRAOCULAR PRESSURE                     



                                        Symmetric and normal to palpation both e

yes. Right eye 23, left eye 26 using the

Tonopen.( patient squeezing)                           



                    EXTERNAL                                



                    Right: Within normal limits                     



                    Left: Within normal limits                     



                    ANTERIOR SEGMENT EXAM:                     



                    LIDS/LASHES/LACRIMALS                     



                    Right: Within normal limits                     



                    Left: Within normal limits                     



                    CONJUNCTIVA/SCLERA                      



                    Right: White and quiet                     



                    Left: White and quiet                     



                    CORNEA                                  



                    Right: Clear without epi defect                     



                    Left: Clear without epi defect                     



                    ANTERIOR CHAMBER                        



                    Right: Formed and grossly clear, no hyphema                 

    



                    Left: Formed and grossly clear, no hyphema                  

   



                    IRIS                                    



                    Right: Round and reactive                     



                    Left: Round and reactive.                     



                    LENS                                    



                    Right: Clear                            



                    Left: Clear                             



                                        DILATED FUNDUS EXAM: (Both eyes dilated 

with phenylephrine 2.5% and tropicamide 

1% @ 8:30AM)                                        



                    OPTIC NERVE:                            



                    Right: Pink, healthy nerve                     



                    Left: Pink, healthy nerve                     



                    C:D RATIO                               



                    Right: 0.2                              



                    Left: 0.2                               



                    POSTERIOR SEGMENT                       



                    Right: Macula, vessels, periphery within normal limits      

               



                    Left: Macula, vessels, periphery within normal limits       

              



                    DIAGNOSES/RECOMMENDATION:                     



                    1. Spina bifida                         



                    - No evidence of disc edema                     



                    - Management as per neuro-surgery                     



                                        The patient has been given information t

o call for an appointment to follow-up 

with Alla Goldberg, MD at the [facility] in in 3-4months. (Located: [address] [place] [address]; Appt #: [phone]).                           



                    Please re-consult if any changes develop.                   

  



                    Thank you for the consult.                     



                    Jazmyn Turner MD                        



                    Ophthalmology, PGY-2                     



                    Faculty note:                           



                                        Patient exam reviewed with the resident.

 I agree with above assessment and plan.

 Any additional findings or changes are detailed below.                         

  



                    examined for headaches. no evidence of papilledema          

           



                                                            

 

                    Extracted from:Title: Progress Note 2018          [facility]



                    Author: Deedee Reinoso MD                     



                    Date: 18                            



                              1.Acute headache,Acute headache                   

  



                                         possibly related to  infection, diffe

rential includes HA, neuropathy, and 

narcotic withdrawal                                 



                                         -Discussed with neurosurgery today do n

ot believe that there is any role for 

intervention, no evidence of infection, shunt is appropriate, no evidence of 
optic disc changes                                  



                     Discussed this with the patient                     



                     2. (ventriculoperitoneal) shunt status                   

  



                     Appears to be functioning well and decompressing the ventri

cles                     



                      3.Acute UTI                           



                                         Will discontinue vancomycin continue Zo

syn for the patient's prior 

osteomyelitis                                       



                     Urine cultures are negative                     



                     4.Decubitus ulcer                      



                     present on admission, evaluated by wound care, RN following

 recs daily                     



                                         -[facility] 

tom[facility].                                           



                     5.Acute pain                           



                                         MMP regimen implemented by [facility], discus

sed recommendations with team today, 

will add NSAID and trazodone for sleep                           



                       Lovenox                              



                                                            



                                                            



                    Extracted from:Title: [facility] Consult Note                     



                    Author: Cassie Traore MD                     



                    Date: 18                            



                                                            



                                        Patient:   JORDAN VERDIN JR       

     MRN: [id number]            FIN: 

[id number]                                        



                    Age:   32 years     Sex:  Male     :  1985         

            



                    Associated Diagnoses:   None                     



                    Author:   Cassie Traore MD                     



                    Basic Information                       



                    Referral source                         



                    Reason for consultation: Complex Acute Pain                 

    



                    Chief Complaint                         



                                        2018 17:41         transfer from [facility] for  shunt 

malfunction/enlarged ventricles c/o HA/intermittent blurred vision x2 days 
moving extremeties bilaterally equally not amb per baseline no d                

           



                    istress noted [facility] cp lymphedema spina bifida multiple pressu

re ulcers                     



                    History of Present Illness                     



                              The patient presents with Location: _             

        



                    Quality: _                              



                    Severity: _                             



                    Timing: _                               



                    Duration: _                             



                    Context: _                              



                    Modifying factors: _                     



                                         , worsening headache c/b nausea, vomiti

ng in setting of prior  shunt. Per 

primary team no planned neurosurgical intervention at this time. consulted for 
help in managing patient's headache .                           



                    Histories                               



                    Past Medical History:                     



                    Resolved                                



                    Asthma (769683689):  Resolved.                     



                    Bronchitis (53033783):  Resolved.                     



                    Cerebral palsy (891126837):  Resolved.                     



                    Hydrocephalus (189625729):  Resolved.                     



                    Osteomyelitis (03205637):  Resolved.                     



                    Family History:                         



                    No family history items have been selected or recorded.     

                



                    Procedure history:                      



                    Cholecystectomy (SNOMED CT 49504902).                     



                    Shunt of cerebral ventricle to extracranial site (SNOMED CT 

919579273).                     



                    Social History                          



                    Social and Psychosocial Habits                     



                    Tobacco                                 



                    2018  Use: Current every day smoker                   

  



                      Exposure to Tobacco Smoke Lives with someone who sm       

              



                      Cigarette Smoking Last 365 Days Yes                     



                      Reg Smoking Cessation Counseling Yes                     



                    .                                       



                    Health Status                           



                    Allergies:                              



                    Allergic Reactions (All)                     



                    Severity Not Documented                     



                    Amoxicillin- No reactions were documented.                  

   



                    Bactrim- No reactions were documented.                     



                    Levaquin- No reactions were documented.                     



                    Minocin- No reactions were documented.                     



                    Morphine- No reactions were documented.                     



                    Toradol- No reactions were documented.                     



                    Zofran- No reactions were documented.                     



                    Canceled/Inactive Reactions (All)                     



                    Severity Not Documented                     



                    Vancomycin- No reactions were documented.,                  

   



                    Allergies (7) Active        Reaction                     



                    amoxicillin        None Documented                     



                    Bactrim        None Documented                     



                    Levaquin        None Documented                     



                    Minocin        None Documented                     



                    morphine        None Documented                     



                    Toradol        None Documented                     



                    Zofran        None Documented                     



                    Current medications:  (Selected)                     



                    Inpatient Medications                     



                    Ordered                                 



                    Beneprotein 7 gm pkt: 2 pkt, PO, BID-Before Meals           

          



                    Lidoderm 5% topical film (patch): 3 patch, TOP, Daily       

              



                    Robaxin: 500 mg, 1 tab, PO, TID                     



                    Saline Flush 0.9%: 10 ml, IVP, PRN, PRN: Line Flush         

            



                    Sodium Chloride 0.9% IV 1,000 mL: 125 ml/hr, IV, Stop:  1:45:00 CDT                     



                    Zosyn: 3.375 gm, IVPB, ABXQ6H                     



                    acetaminophen: 1,000 mg, 2 tab, PO, Q6Hnow                  

   



                    ascorbic acid: 500 mg, 1 tab, PO, BID                     



                    docusate: 100 mg, 1 cap, PO, BID                     



                    enoxaparin: 40 mg, 0.4 mL, SUB-Q, [id number]                  

   



                    gabapentin: 300 mg, 1 cap, PO, Q8Hnow                     



                    multivitamin: 1 tab, PO, Daily                     



                    normal saline 0.9% IV 1,000 mL: 75 ml/hr, IV, Stop: 18

 22:38:00 CDT                     



                    oxyCODONE 5 mg immediate release: 10 mg, 2 tab, PO, Q4H, PRN

: Pain Score 7-10                     



                    oxyCODONE 5 mg immediate release: 5 mg, 1 tab, PO, Q4H, PRN:

 Pain Score 4-6                     



                    remove patch: 3 patch, TOP, Bedtime                     



                    tramadol: 100 mg, 2 tab, PO, Q6Hnow                     



                    vancomycin: 1,000 mg, IVPB, ABXQ8H                     



                    zinc sulfate: 220 mg, 1 cap, PO, Daily                     



                    Documented Medications                     



                    Documented                              



                    Percocet 10/325 oral tablet: 1 tab, PO, TID, 0 Refill(s),   

                  



                    Medications (19) Active                     



                    Scheduled: (14)                         



                    acetaminophen 500 mg TAB  1,000 mg 2 tab, PO, Q6Hnow        

             



                    ascorbic acid 500 mg TAB  500 mg 1 tab, PO, BID             

        



                    docusate sodium 100 mg CAP  100 mg 1 cap, PO, BID           

          



                    enoxaparin 40 mg/0.4 ml INJ  40 mg 0.4 mL, SUB-Q, [id number]  

                   



                    gabapentin 300 mg CAP  300 mg 1 cap, PO, Q8Hnow             

        



                    lidocaine 5% top patch  3 patch, TOP, Daily                 

    



                    lidocaine patch removal  3 patch, TOP, Bedtime              

       



                    methocarbamol 500 mg TAB  500 mg 1 tab, PO, TID             

        



                    multiple vit Therapeutic TAB  1 tab, PO, Daily              

       



                    piperacillin-tazobactam 3.375 gm INJ VL  3.375 gm, IVPB, ABX

Q6H                     



                    traMADol 50 mg TAB  100 mg 2 tab, PO, Q6Hnow                

     



                    vancomycin 1 gm INJ VL  1,000 mg, IVPB, ABXQ8H              

       



                                        whey protein isolate - soy lecithin (Arthur

eprotein) 7 gm pkt  2 pkt, PO, BID-

Before Meals                                        



                    zinc sulfate 220 mg (zinc elemental 50mg) CAP  220 mg 1 cap,

 PO, Daily                     



                    Continuous: (2)                         



                    sodium chloride 0.9% 1000 ml INJ 1,000 mL  1,000 mL, IV, 75 

ml/hr                     



                    sodium chloride 0.9% 1000 ml INJ 1,000 mL  1,000 mL, IV, 125

 ml/hr                     



                    PRN: (3)                                



                    oxyCODONE IR 5 mg TAB  5 mg 1 tab, PO, Q4H                  

   



                    oxyCODONE IR 5 mg TAB  10 mg 2 tab, PO, Q4H                 

    



                    sodium chloride 0.9% 10 ml flush syr BD  10 ml, IVP, PRN    

                 



                    Problem list:                           



                    All Problems                            



                    Acute pain / SNOMED CT 850894295 / Confirmed                

     



                    Headache / SNOMED CT 81092478 / Confirmed                   

  



                    Review of Systems                       



                    Constitutional                          



                    Cardiovascular                          



                    Ear/Nose/Mouth/Throat                     



                    Respiratory:  Negative.                     



                    Gastrointestinal:  Nausea.                     



                    Musculoskeletal                         



                    Neurologic:  headache,  shunt in place. +nausea.          

           



                    Psychiatric                             



                    Endocrine                               



                    Hematology/Lymphatics                     



                    Physical Examination                     



                    VS/Measurements                         



                    Measurements from flowsheet : Measurements                  

   



                    2018 10:20                        



                    Heparin Dosing Weight (kg)                     



                    86.33                                   



                    2018 09:00                        



                    Weight                                  



                    127.027 kg                              



                    Dosing Weight Difference Percent                     



                    -0.191 %                                



                    Dosing Weight Collection Method                     



                    Estimated                               



                    2018 00:44                        



                    Heparin Dosing Weight (kg)                     



                    86.43                                   



                    2018 00:43                        



                    Height                                  



                    162.56 cm                               



                    Height Collection Method                     



                    Estimated                               



                    Weight                                  



                    127.27 kg                               



                    Dosing Weight Difference Percent                     



                    -0.002 %                                



                    Dosing Weight Collection Method                     



                    Estimated                               



                    Body Surface Area                       



                    2.3973 m2                               



                    Body Mass Index                         



                    48.16 m2                                



                    2018 17:47                        



                    Heparin Dosing Weight (kg)                     



                    79.53                                   



                    2018 17:41                        



                    Height                                  



                    149.86 cm                               



                    Height Collection Method                     



                    Stated                                  



                    Weight                                  



                    127.273 kg                              



                    Dosing Weight Difference Percent                     



                    -2.439 %                                



                    Dosing Weight Collection Method                     



                    Measured                                



                    Body Surface Area                       



                    2.3018 m2                               



                    Body Mass Index                         



                    56.67 m2                                



                    ,                                       



                    Vital Signs (last 24 hrs)_____        Last Charted__________

_                     



                    Temp Oral        98.9 DegF  (MAY 04 19:52)                  

   



                    Heart Rate Peripheral        H 107bpm  (MAY 04 19:52)       

              



                    Resp Rate            17 BRMIN  (MAY 04 19:52)               

      



                    SBP        134 mmHg  (MAY 04 19:52)                     



                    DBP        81 mmHg  (MAY 04 19:52)                     



                    SpO2        94 %  (MAY 04 19:52)                     



                    Weight        127.02 kg  (MAY 04 09:00)                     



                    Height        162.56 cm  (MAY 04 00:43)                     



                    BMI        48.16  (MAY 04 00:43)                     



                    HENT:   shunt in situ, headache.                     



                    Review / Management                     



                    Results review:                         



                    Labs (Last four charted values)                     



                    WBC                     7.4    (MAY 04)    H 11.2    (MAY 03

)                     



                    Hgb                     15.8    (MAY 04)    16.4    (MAY 03)

                     



                    Hct                     48.9    (MAY 04)    49.8    (MAY 03)

                     



                    Plt                     397    (MAY 04)    408    (MAY 03)  

                   



                    Na                      139    (MAY 04)    140    (MAY 03)  

                   



                    K                       4.5    (MAY 04)    4.1    (MAY 03)  

                   



                    CO2                     L 21    (MAY 04)    L 21    (MAY 03)

                     



                    Cl                      107    (MAY 04)    109    (MAY 03)  

                   



                    Cr                      0.70    (MAY 04)    0.56    (MAY 03)

                     



                    BUN                     12    (MAY 04)    9    (MAY 03)     

                



                    Glucose Random          78    (MAY 04)    78    (MAY 03)    

                 



                    Mg                      2.3    (MAY 04)                     



                    Phos                    3.5    (MAY 04)                     



                    Ca                      9.1    (MAY 04)    L 7.8    (MAY 03)

                     



                    PT                      14.0    (MAY 04)    13.0    (MAY 03)

                     



                    INR                     1.08    (MAY 04)    0.98    (MAY 03)

                     



                    PTT                     H 37.6    (MAY 04)    33.2    (MAY 0

3)    .                     



                    Chest x-ray results                     



                    ECG interpretation                      



                    Impression and Plan                     



                    Diagnosis                               



                    Acute headache ([id number], Working, Medical).            

         



                    Course:  Worsening.                     



                    Orders                                  



                                        Education and Follow-up:       Counseled

: Patient, Regarding treatment, 

Regarding medications.                              



                    Professional Services                     



                                        32M hx CP,  shunt, chronic foot pressu

re ulcers. Consulted for management of 

acute headache.                                     



                                        - Necessary to work up etiology of heada

ches. Per primary team, no current 

neurosurgical intervention planned. Recommend neurology consult if primary team 
concerned for migraines per notes.                           



                    - initiated multimodal pain regimen.                     



                    - APMS will continue to follow                     



                    Cassie Traore MD PGY3                     



                    Addendum by Scott Menendez MD on 2018 09:19             

        



                                        I have personally evaluated this patient

 and discussed the plan of care with 

this resident. I have reviewed the note below and agree with the history, 
examination findings, assessment and plan.                           



                                                            



                    Extracted from:Title: History and Physical                  

   



                    Author: Romi Bai MD                     



                    Date: 18                            



                                                            



                                                            



                                        Patient is a 32-year-old with spina bifi

daand hydrocephalus treated by  shunt 

who presented to [facility] emergency room complaining of headache and blurry 
vision. He was there 2 days and transferredt                           



                                        o [facility] for higher level 

of care. Here he was evaluate by 

ophthalmology and neurosurgery. There is no surgical interventionwarranted at 
this time. He has evidence of UTI andpossibly infec                           



                                        tedheel and sacral decubiti that were pr

esent on admission. CRP is 15. We will 

need to treat with aggressive antibiotic regimen, wound care, and documentsthere
is no systemic infection beforeany CSF can                           



                                         be obtained from his  shunt. Wewill t

ry to avoid any IV narcotics,manage his 

pain with oral analgesicsas he is toleratingall his meals,and de-escalate his 
antibiotics as soon as possible.                           



                                                            



                    1.Acute headache                        



                                        Patient has headaches that may bedue to 

infection of his  system, however the 

differential also includes migraine, neuropathy and narcotics withdrawal.       

                    



                     2. (ventriculoperitoneal) shunt status                   

  



                    Appears to be functioning well and decompressing the ventric

les                     



                    We will avoid anyinterventionand access until systemic infec

tion is ruled out                     



                     3.Acute UTI                            



                    Broad-spectrum antibiotic coverage until systemic infection 

is ruled out                     



                     4.Decubitus ulcer                      



                    Present on admission                     



                    We will ask wound care to evaluate and prescribe treatment  

                   



                                                            



                                                            



                                                            



                      Lovenox                               



                      Home once etiology of headache is known                   

  



                    [facility] Hospitalist service is primary. Page [phone] c

oncerns.                     



                                                            



                                                            







Plan of Care







                                        No Data Provided for This Section







Social History







                    Social History      Date                Source



                                                            

 

                    Social History TypeResponse 2018          [facility]

o



                    Smoking Status                          



                                        Current every day smoker; Type: Cigarett

es; Lives with someone who smokes; 

Cigarette Smoking Last 365 Days Yes; Reg Smoking Cessation Counseling No        

                   



                    entered on: 18                     



                                                            

 

                    Social History TypeResponse 2018          [facility]



                    Smoking Status                          



                                        Current every day smoker; Type: Cigarett

es; Lives with someone who smokes; 

Cigarette Smoking Last 365 Days Yes; Reg Smoking Cessation Counseling No        

                   



                    entered on: 18                     



                                                            







Family History







                                        No Data Provided for This Section







Advance Directives







                                        No Data Provided for This Section







Functional Status







                                        No Data Provided for This Section

## 2020-06-25 NOTE — XMS REPORT
Clinical Summary

                            Created on:2020



Patient:Jordan Dale

Sex:Male

:1985

External Reference #:[id number]





Demographics







                          Address                   [address]



                                                    [place] [address]

 

                          Home Phone                [phone]

 

                          Mobile Phone              [phone]

 

                          Preferred Language        English

 

                          Marital Status            Unknown

 

                          Scientology Affiliation     Unknown

 

                          Race                      Black or 

 

                          Ethnic Group              Not  or 









Author







                          Organization              [facility]

 

                          Address                   [address] [place] [address]









Support







                Name            Relationship    Address         Phone

 

                Sabrina Dale   Unavailable     [address] [phone]

[phone]



                                                [place] [address] 









Care Team Providers







                    Name                Role                Phone

 

                    Sharpless           Primary Care Provider [phone]









Allergies







             Active Allergy Reactions    Severity     Noted Date   Comments

 

             Sulfamethoxazole-Trimethoprim Hives        High         2017 

  

 

             Levofloxacin Hives        High         2017   

 

             Morphine     Hives        High         2017   

 

             Sesame Seed  Hives                     2017   

 

             Ketorolac    Rash         High         2017   

 

             Vancomycin Analogues Rash         High         2017   

 

             Ondansetron Hcl (Pf) Nausea And Vomiting              2017   







Medications







          Medication Sig       Dispensed Refills   Start Date End Date  Status

 

          oxyCODONE-acetami Take 1 tablet by           0                        

     Active



          nophen (PERCOCET) mouth every 4 (four)                                

         



           mg per hours as needed for                                     

    



          tablet    Pain.                                             

 

          buPROPion Take 1 tablet (150 mg 30 tablet 0         2020

021 Active



          (WELLBUTRIN XL) total) by mouth                                       

  



          150 MG 24 hr daily.                                            



          tablet                                                      

 

          citalopram Take 1 tablet (40 mg 30 tablet 11        2020

021 Active



          (CELEXA) 40 MG total) by mouth                                        

 



          tablet    daily.                                            

 

          insulin lispro Inject 0-12 Units 10 mL     0         2020 01/15/

2021 Active



          (HUMALOG) 100 subcutaneously 3                                        

 



          unit/mL injection (three) times daily                                 

        



                    before meals.                                         

 

          insulin lispro Inject 25 Units 10 mL     0         2020 01/15/20

21 Active



          (HUMALOG) 100 subcutaneously 3                                        

 



          unit/mL injection (three) times daily                                 

        



                    before meals.                                         

 

          zinc      Apply 5 g topically 2           0         2020        

   Active



          oxide-petrolatum (two) times daily.                                   

      



          (CRITIC-AID)                                                   



          20-51 % Pste                                                   



          topical paste                                                   

 

          meropenem Inject 1 g           0         2020           Active



          (MERREM) MBP 1 gm intravenously every 8                               

          



          in 100 mL NS (eight) hours.                                         

 

          insulin glargine Inject 58 Units 10 mL     0         2020       

    Active



          (LANTUS) 100 subcutaneously 2                                         



          unit/mL injection (two) times daily Use                               

          



                    as directed.                                         

 

          traZODone Take 1 tablet (100 mg           0         2020 02/15/2

020 



          (DESYREL) 100 MG total) by mouth                                      

   



          tablet    nightly for 30 days.                                        

 







Active Problems







                          Problem                   Noted Date

 

                          Hyperglycemia without ketosis 2020

 

                          Spina bifida              2017

 

                          Pyelonephritis            2017







Encounters







             Date         Type         Specialty    Care Team    Description

 

             2020 - Hospital Encounter General Internal Ashia,     Hyper

glycemia without 

ketosis;



                2020                      Medicine        MD Isela



                                        Shunt malfunction, sequela;



                                                    [place],   Type 2 diabetes

 mellitus with hyperglycemia, unspecified whether 

long term insulin use ([facility]ly [facility]);



                                                                MD Salvador



                                        Diabetes mellitus, new onset ([facility]ly [facility]);



                                                    Rafi, Amer, Type 2 diabete

s mellitus treated with insulin (Formerly [facility]);



                                                    MD           Proteus infecti

on;



                                                                 ESBL (extended 

spectrum beta-lactamase) producing bacteria infection;



                                                                 Polymicrobial b

acterial infection;



                                                                 Pyelonephritis;



                                                                 Spina bifida of

 sacral region with hydrocephalus ([facility]);



                                                                 Decubitus ulcer

 of ankle, right, unstageable (HCC);



                                                                 Pressure ulcer 

of right heel, stage 3 (HCC);



                                                                 Pressure ulcer 

of left heel, stage 2 ([facility]);



                                                                 Eschar of heel

 

             2020   Travel                                 

 

             2020   Documentation Internal Medicine Isela Ang MD  



after 2019



Social History







             Tobacco Use  Types        Packs/Day    Years Used   Date

 

             Current Every Day Smoker Cigarettes   0.5                       









                                        Tobacco Cessation: Ready to Quit: No









                          Sex Assigned at Birth     Date Recorded

 

                          Not on file               









                    Job Start Date      Occupation          Industry

 

                    Not on file         Not on file         Not on file









                    Travel History      Travel Start        Travel End









                                        No recent travel history available.







Last Filed Vital Signs







                    Vital Sign          Reading             Time Taken

 

                    Blood Pressure      136/78              2020 12:58 PM 

CST

 

                    Pulse               96                  2020 12:58 PM 

CST

 

                    Temperature         35.8 C (96.4 F) 2020 12:58 PM 

CST

 

                    Respiratory Rate    18                  2020 12:58 PM 

CST

 

                    Oxygen Saturation   96%                 2020 12:58 PM 

CST

 

                    Inhaled Oxygen Concentration -                   -

 

                    Weight              -                   -

 

                    Height              -                   -

 

                    Body Mass Index     -                   -







Plan of Treatment







                Health Maintenance Due Date        Last Done       Comments

 

                PNEUMOCOCCAL VACCINE 2-64 YEARS AT 1991                   

   



                RISK (1 of 1 - PPSV23)                                 

 

                HEMOGLOBIN A1C  04/10/2020      01/10/2020, 2020, 



                                                2020      

 

                INFLUENZA VACCINE (Season Ended) 2020                     

 







Procedures







             Procedure Name Priority     Date/Time    Associated   Comments



                                                    Diagnosis    

 

             RHYTHM STRIP - SCAN              2020  2:11              



                                       PM CST                    

 

             RHYTHM STRIP - SCAN              2020  3:32              



                                       PM CST                    

 

             POCT-GLUCOSE METER Routine      2020 12:52              Resul

ts for this



                                       PM CST                    procedure are i

n



                                                                 the results



                                                                 section.

 

             POCT-GLUCOSE METER Routine      2020  9:03              Resul

ts for this



                                       AM CST                    procedure are i

n



                                                                 the results



                                                                 section.

 

             POCT-GLUCOSE METER Routine      01/15/2020  8:45              Resul

ts for this



                                       PM CST                    procedure are i

n



                                                                 the results



                                                                 section.

 

             POCT-GLUCOSE METER Routine      01/15/2020  4:35              Resul

ts for this



                                       PM CST                    procedure are i

n



                                                                 the results



                                                                 section.

 

             POCT-GLUCOSE METER Routine      01/15/2020 12:08              Resul

ts for this



                                       PM CST                    procedure are i

n



                                                                 the results



                                                                 section.

 

             POCT-GLUCOSE METER Routine      01/15/2020  8:35              Resul

ts for this



                                       AM CST                    procedure are i

n



                                                                 the results



                                                                 section.

 

             POCT-GLUCOSE METER Routine      2020  9:02              Resul

ts for this



                                       PM CST                    procedure are i

n



                                                                 the results



                                                                 section.

 

             MR LOWER EXTREMITY Routine      2020  6:46              Resul

ts for this



             WITHOUT IV CONTRAST              PM CST                    procedur

e are in



             LEFT                                                the results



                                                                 section.

 

             MR LOWER EXTREMITY Routine      2020  6:46              Resul

ts for this



             WITHOUT IV CONTRAST              PM CST                    procedur

e are in



             RIGHT                                               the results



                                                                 section.

 

             POCT-GLUCOSE METER Routine      2020 12:42              Resul

ts for this



                                       PM CST                    procedure are i

n



                                                                 the results



                                                                 section.

 

             POCT-GLUCOSE METER Routine      2020  8:24              Resul

ts for this



                                       AM CST                    procedure are i

n



                                                                 the results



                                                                 section.

 

             POCT-GLUCOSE METER Routine      2020  8:57              Resul

ts for this



                                       PM CST                    procedure are i

n



                                                                 the results



                                                                 section.

 

             POCT-GLUCOSE METER Routine      2020  6:03              Resul

ts for this



                                       PM CST                    procedure are i

n



                                                                 the results



                                                                 section.

 

             POCT-GLUCOSE METER Routine      2020 12:18              Resul

ts for this



                                       PM CST                    procedure are i

n



                                                                 the results



                                                                 section.

 

             POCT-GLUCOSE METER Routine      2020  7:56              Resul

ts for this



                                       AM CST                    procedure are i

n



                                                                 the results



                                                                 section.

 

             POCT-GLUCOSE METER Routine      2020  9:14              Resul

ts for this



                                       PM CST                    procedure are i

n



                                                                 the results



                                                                 section.

 

             POCT-GLUCOSE METER Routine      2020  5:22              Resul

ts for this



                                       PM CST                    procedure are i

n



                                                                 the results



                                                                 section.

 

             POCT-GLUCOSE METER Routine      2020 11:16              Resul

ts for this



                                       AM CST                    procedure are i

n



                                                                 the results



                                                                 section.

 

             POCT-GLUCOSE METER Routine      2020  8:07              Resul

ts for this



                                       AM CST                    procedure are i

n



                                                                 the results



                                                                 section.

 

             POCT-GLUCOSE METER Routine      2020  9:20              Resul

ts for this



                                       PM CST                    procedure are i

n



                                                                 the results



                                                                 section.

 

             POCT-GLUCOSE METER Routine      2020  5:34              Resul

ts for this



                                       PM CST                    procedure are i

n



                                                                 the results



                                                                 section.

 

             POCT-GLUCOSE METER Routine      2020 11:56              Resul

ts for this



                                       AM CST                    procedure are i

n



                                                                 the results



                                                                 section.

 

             POCT-GLUCOSE METER Routine      2020  8:11              Resul

ts for this



                                       AM CST                    procedure are i

n



                                                                 the results



                                                                 section.

 

             CBC W/PLT COUNT & AUTO Routine      2020  6:26              R

esults for this



             DIFFERENTIAL              AM CST                    procedure are i

n



                                                                 the results



                                                                 section.

 

             BASIC METABOLIC PANEL Routine      2020  6:26              Re

sults for this



             (7)                       AM CST                    procedure are i

n



                                                                 the results



                                                                 section.

 

             CBC W/PLT COUNT & AUTO Routine      2020  6:26              R

esults for this



             DIFFERENTIAL              AM CST                    procedure are i

n



                                                                 the results



                                                                 section.

 

             POCT-GLUCOSE METER Routine      01/10/2020  7:43              Resul

ts for this



                                       PM CST                    procedure are i

n



                                                                 the results



                                                                 section.

 

             POCT-GLUCOSE METER Routine      01/10/2020  4:28              Resul

ts for this



                                       PM CST                    procedure are i

n



                                                                 the results



                                                                 section.

 

             POCT-GLUCOSE METER Routine      01/10/2020 11:18              Resul

ts for this



                                       AM CST                    procedure are i

n



                                                                 the results



                                                                 section.

 

             POCT-GLUCOSE METER Routine      01/10/2020  7:25              Resul

ts for this



                                       AM CST                    procedure are i

n



                                                                 the results



                                                                 section.

 

             CBC W/PLT COUNT & AUTO Routine      01/10/2020  4:47              R

esults for this



             DIFFERENTIAL              AM CST                    procedure are i

n



                                                                 the results



                                                                 section.

 

             ISLET CELL AB TITER Routine      01/10/2020  4:47              Resu

lts for this



                                       AM CST                    procedure are i

n



                                                                 the results



                                                                 section.

 

             ISLET CELL AB SCREEN Routine      01/10/2020  4:47              Res

ults for this



                                       AM CST                    procedure are i

n



                                                                 the results



                                                                 section.

 

             BASIC METABOLIC PANEL Routine      01/10/2020  4:47              Re

sults for this



             (7)                       AM CST                    procedure are i

n



                                                                 the results



                                                                 section.

 

             CBC W/PLT COUNT & AUTO Routine      01/10/2020  4:47              R

esults for this



             DIFFERENTIAL              AM CST                    procedure are i

n



                                                                 the results



                                                                 section.

 

             HEMOGLOBIN A1C Routine      01/10/2020  4:47              Results f

or this



                                       AM CST                    procedure are i

n



                                                                 the results



                                                                 section.

 

             LIPID PANEL  Routine      01/10/2020  4:47              Results for

 this



                                       AM CST                    procedure are i

n



                                                                 the results



                                                                 section.

 

             ISLET CELL AB SCR Routine      01/10/2020  4:47              Result

s for this



                                       AM CST                    procedure are i

n



                                                                 the results



                                                                 section.

 

             CAMILA-65       Routine      01/10/2020  4:47              Results for

 this



                                       AM CST                    procedure are i

n



                                                                 the results



                                                                 section.

 

             POCT-GLUCOSE METER Routine      2020 11:35              Resul

ts for this



                                       PM CST                    procedure are i

n



                                                                 the results



                                                                 section.

 

             PERIPHERAL VASCULAR              2020  9:24              



             REPORT - SCAN              PM CST                    

 

             POCT-GLUCOSE METER Routine      2020  6:25              Resul

ts for this



                                       PM CST                    procedure are i

n



                                                                 the results



                                                                 section.

 

             POCT-GLUCOSE METER Routine      2020  3:09              Resul

ts for this



                                       PM CST                    procedure are i

n



                                                                 the results



                                                                 section.

 

             BLOOD CULTURE STAT         2020 11:47              Results fo

r this



                                       AM CST                    procedure are i

n



                                                                 the results



                                                                 section.

 

             BLOOD CULTURE STAT         2020 11:47              Results fo

r this



                                       AM CST                    procedure are i

n



                                                                 the results



                                                                 section.

 

             POCT-GLUCOSE METER Routine      2020 10:18              Resul

ts for this



                                       AM CST                    procedure are i

n



                                                                 the results



                                                                 section.

 

             ARTERIAL DOPPLER LEGS Routine      2020  9:35              Re

sults for this



             BILATERAL                 AM CST                    procedure are i

n



                                                                 the results



                                                                 section.

 

             (MANUAL DIFFERENTIAL) Routine      2020  4:07              Re

sults for this



                                       AM CST                    procedure are i

n



                                                                 the results



                                                                 section.

 

             CBC W/PLT COUNT & AUTO Routine      2020  4:07              R

esults for this



             DIFFERENTIAL              AM CST                    procedure are i

n



                                                                 the results



                                                                 section.

 

             BASIC METABOLIC PANEL Routine      2020  4:07              Re

sults for this



             (7)                       AM CST                    procedure are i

n



                                                                 the results



                                                                 section.

 

             CBC W/PLT COUNT & AUTO Routine      2020  4:07              R

esults for this



             DIFFERENTIAL              AM CST                    procedure are i

n



                                                                 the results



                                                                 section.

 

             HEMOGLOBIN A1C Routine      2020  4:07              Results f

or this



                                       AM CST                    procedure are i

n



                                                                 the results



                                                                 section.

 

             LIPID PANEL  Routine      2020  4:07              Results for

 this



                                       AM CST                    procedure are i

n



                                                                 the results



                                                                 section.

 

             POCT-GLUCOSE METER Routine      2020  9:41              Resul

ts for this



                                       PM CST                    procedure are i

n



                                                                 the results



                                                                 section.

 

             US ABDOMEN LIMITED STAT         2020  6:45              Resul

ts for this



                                       PM CST                    procedure are i

n



                                                                 the results



                                                                 section.

 

             POCT-GLUCOSE METER Routine      2020  6:27              Resul

ts for this



                                       PM CST                    procedure are i

n



                                                                 the results



                                                                 section.

 

             (CELLAVISION MANUAL STAT         2020  4:50              Resu

lts for this



             DIFF)                     PM CST                    procedure are i

n



                                                                 the results



                                                                 section.

 

             CBC W/PLT COUNT & AUTO STAT         2020  4:50              R

esults for this



             DIFFERENTIAL              PM CST                    procedure are i

n



                                                                 the results



                                                                 section.

 

             CBC W/PLT COUNT & AUTO STAT         2020  4:50              R

esults for this



             DIFFERENTIAL              PM CST                    procedure are i

n



                                                                 the results



                                                                 section.

 

             URINALYSIS W/ REFLEX STAT         2020  4:40              Res

ults for this



             URINE CULTURE              PM CST                    procedure are 

in



                                                                 the results



                                                                 section.

 

             POCT-GLUCOSE METER Routine      2020  4:15              Resul

ts for this



                                       PM CST                    procedure are i

n



                                                                 the results



                                                                 section.

 

             CT BRAIN WITHOUT IV STAT         2020  3:26              Resu

lts for this



             CONTRAST                  PM CST                    procedure are i

n



                                                                 the results



                                                                 section.

 

             XR SHUNT SERIES STAT         2020  2:49              Results 

for this



                                       PM CST                    procedure are i

n



                                                                 the results



                                                                 section.

 

             POCT-GLUCOSE METER Routine      2020 11:23              Resul

ts for this



                                       AM CST                    procedure are i

n



                                                                 the results



                                                                 section.

 

             POCT-GLUCOSE METER Routine      2020  7:36              Resul

ts for this



                                       AM CST                    procedure are i

n



                                                                 the results



                                                                 section.

 

             HEMOGLOBIN A1C Routine      2020  7:08              Results f

or this



                                       AM CST                    procedure are i

n



                                                                 the results



                                                                 section.

 

             LIPID PANEL  Routine      2020  7:08              Results for

 this



                                       AM CST                    procedure are i

n



                                                                 the results



                                                                 section.

 

             BASIC METABOLIC PANEL Routine      2020  7:08              Re

sults for this



             (7)                       AM CST                    procedure are i

n



                                                                 the results



                                                                 section.

 

             TSH/FREE T4 IF Routine      2020  7:08              Results f

or this



             INDICATED                 AM CST                    procedure are i

n



                                                                 the results



                                                                 section.

 

             POCT-GLUCOSE METER Routine      2020 12:37              Resul

ts for this



                                       AM CST                    procedure are i

n



                                                                 the results



                                                                 section.

 

             DRUG SCREEN, URINE, Routine      2020 12:27              



             COMPREHENSIVE              AM CST                    

 

             URINE CULTURE Routine      2020 12:27              Results fo

r this



                                       AM CST                    procedure are i

n



                                                                 the results



                                                                 section.

 

             WOUND CULTURE + GRAM Routine      2020 12:27              Res

ults for this



             STAIN                     AM CST                    procedure are i

n



                                                                 the results



                                                                 section.

 

             XR FOOT 2 VIEWS RIGHT Routine      2020  9:22              Re

sults for this



                                       PM CST                    procedure are i

n



                                                                 the results



                                                                 section.

 

             XR FOOT 2 VIEWS LEFT Routine      2020  9:22              Res

ults for this



                                       PM CST                    procedure are i

n



                                                                 the results



                                                                 section.

 

             POCT-GLUCOSE METER Routine      2020  8:52              Resul

ts for this



                                       PM CST                    procedure are i

n



                                                                 the results



                                                                 section.



after 2019



Results

RHYTHM STRIP - SCAN (2020  2:11 PM CST)Only the most recent of2 results
within the time period is included.





                          Narrative                 Performed At

 

                                        This result has an attachment that is no

t available.



                                        



POC-Glucose meter (2020 12:52 PM CST)Only the most recent of36 results
within the time period is included.





                Component       Value           Ref Range       Performed At

 

                POC-Glucose Meter 140 (H)Comment: : TESTED 70 - 110 mg/dL  [facility]



                                AT [address]

[facility], [address]:                 



                                /Technician ID =                 



                                [id number] for JUAN RAMON                 



                                ANANT                        









                                        Specimen

 

                                        Blood









                Performing Organization Address         City/State/Zipcode Phone

 Number

 

                [facility] [address] [place] [address]

 [address] [phone]



                CENTER                                          



MR lower extremity without IV contrast left side (2020  6:46 PM CST)





                                        Specimen

 

                                        









                          Narrative                 Performed At

 

                                        FINAL REPORT



                                        [facility]



                                         



                                        



                                        PATIENT ID: [id number]



                                        



                                         



                                        



                                        MRI of the right and left hindfoot witho

ut intravenous contrast



                                        



                                         



                                        



                                        History: Osteomyelitis, diabetic foot



                                        



                                         



                                        



                                        Comparison: Radiograph dated 2020



                                        



                                         



                                        



                                        Technique: Multiplanar multisequence MRI

 of the right and left 



                                        



                                        hindfoot was performed without intraveno

us contrast using the 



                                        



                                        hindfoot osteomyelitis protocol



                                        



                                         



                                        



                                        Findings:



                                        



                                         



                                        



                                        Right foot:



                                        



                                         



                                        



                                        Marked T1 and T2 hypointense soft tissue

 thickening is noted at the 



                                        



                                        medial aspect of the right heel, likely 

to reflect scar tissue. There 



                                        



                                        is also mild subcutaneous edema at the l

ateral aspect of the mid foot 



                                        



                                        and forefoot, incompletely imaged. No di

screte drainable fluid 



                                        



                                        collection is identified.



                                        



                                         



                                        



                                        There is no marrow signal abnormality to

 suggest osteomyelitis. There 



                                        



                                        is a small nonspecific joint effusion at

 the ankle and subtalar 



                                        



                                        joint. Scattered degenerative changes ar

e noted.



                                        



                                         



                                        



                                        There is marked fatty atrophy of the dis

hudson leg and foot musculature. 



                                        



                                        Severe flexor hallucis longus tendinopat

hy. No tenosynovitis.



                                        



                                         



                                        



                                        Left foot:



                                        



                                         



                                        



                                        There is a large area of soft tissue def

ect and granulation tissue at 



                                        



                                        the posteromedial aspect of the heel, re

aching the calcaneus, but 



                                        



                                        there is no drainable fluid collection. 

Deformity is noted in the 



                                        



                                        hindfoot, and the forefoot appears adduc

huma. No acute fracture. No 



                                        



                                        marrow signal abnormality is identified 

to indicate acute 



                                        



                                        osteomyelitis. There is no significant j

oint effusion.



                                        



                                         



                                        



                                        There is severe atrophy of the foot and 

distal leg musculature. No 



                                        



                                        significant tenosynovitis identified.



                                        



                                         



                                        



                                        IMPRESSION:



                                        



                                         



                                        



                                        Granulation/scar tissue at the medial as

pect of the heel bilaterally. 



                                        



                                        No MR evidence of osteomyelitis.



                                        



                                         



                                        



                                        Severe muscle atrophy.



                                        



                                         



                                        



                                        Signed: Jorge Cornejo MD



                                        



                                        Report Verified Date/Time:01/15/2020

 09:12:01



                                        



                                         



                                        



                                        Reading Location: [address] Reading Room



                                        



                          Electronically signed by: GAYLE HERBERT on 01/15/2020 



                                        09:12 AM



                                        



                                                    









                                        Procedure Note

 

                                        Interface, External Ris In - 01/15/2020 

 9:14 AM CST



FINAL REPORT



                                        



                                        PATIENT ID:   [id number]



                                        



                                        MRI of the right and left hindfoot witho

ut intravenous contrast



                                        



                                        History: Osteomyelitis, diabetic foot



                                        



                                        Comparison: Radiograph dated 2020



                                        



                                        Technique: Multiplanar multisequence MRI

 of the right and left



                                        hindfoot was performed without intraveno

us contrast using the



                                        hindfoot osteomyelitis protocol



                                        



                                        Findings:



                                        



                                        Right foot:



                                        



                                        Marked T1 and T2 hypointense soft tissue

 thickening is noted at the



                                        medial aspect of the right heel, likely 

to reflect scar tissue. There



                                        is also mild subcutaneous edema at the l

ateral aspect of the mid foot



                                        and forefoot, incompletely imaged. No di

screte drainable fluid



                                        collection is identified.



                                        



                                        There is no marrow signal abnormality to

 suggest osteomyelitis. There



                                        is a small nonspecific joint effusion at

 the ankle and subtalar



                                        joint. Scattered degenerative changes ar

e noted.



                                        



                                        There is marked fatty atrophy of the dis

hudson leg and foot musculature.



                                        Severe flexor hallucis longus tendinopat

hy. No tenosynovitis.



                                        



                                        Left foot:



                                        



                                        There is a large area of soft tissue def

ect and granulation tissue at



                                        the posteromedial aspect of the heel, re

aching the calcaneus, but



                                        there is no drainable fluid collection. 

Deformity is noted in the



                                        hindfoot, and the forefoot appears adduc

huma. No acute fracture. No



                                        marrow signal abnormality is identified 

to indicate acute



                                        osteomyelitis. There is no significant j

oint effusion.



                                        



                                        There is severe atrophy of the foot and 

distal leg musculature. No



                                        significant tenosynovitis identified.



                                        



                                        IMPRESSION:



                                        



                                        Granulation/scar tissue at the medial as

pect of the heel bilaterally.



                                        No MR evidence of osteomyelitis.



                                        



                                        Severe muscle atrophy.



                                        



                                        Signed: Jorge Cornejo MD



                                        Report Verified Date/Time:  01/15/2020 0

9:12:01



                                        



                                        Reading Location: [facility] C013X [facility] Reading Room



                                                Electronically signed by: JORGE CORNEJO M.D. on 01/15/2020 09:12 AM



                                        









                Performing Organization Address         City/State/Zipcode Phone

 Number

 

                [facility]                                          



MR lower extremity without IV contrast right side (2020  6:46 PM CST)





                                        Specimen

 

                                        









                          Narrative                 Performed At

 

                                        FINAL REPORT



                                        [facility]



                                         



                                        



                                        PATIENT ID: [id number]



                                        



                                         



                                        



                                        MRI of the right and left hindfoot witho

ut intravenous contrast



                                        



                                         



                                        



                                        History: Osteomyelitis, diabetic foot



                                        



                                         



                                        



                                        Comparison: Radiograph dated 2020



                                        



                                         



                                        



                                        Technique: Multiplanar multisequence MRI

 of the right and left 



                                        



                                        hindfoot was performed without intraveno

us contrast using the 



                                        



                                        hindfoot osteomyelitis protocol



                                        



                                         



                                        



                                        Findings:



                                        



                                         



                                        



                                        Right foot:



                                        



                                         



                                        



                                        Marked T1 and T2 hypointense soft tissue

 thickening is noted at the 



                                        



                                        medial aspect of the right heel, likely 

to reflect scar tissue. There 



                                        



                                        is also mild subcutaneous edema at the l

ateral aspect of the mid foot 



                                        



                                        and forefoot, incompletely imaged. No di

screte drainable fluid 



                                        



                                        collection is identified.



                                        



                                         



                                        



                                        There is no marrow signal abnormality to

 suggest osteomyelitis. There 



                                        



                                        is a small nonspecific joint effusion at

 the ankle and subtalar 



                                        



                                        joint. Scattered degenerative changes ar

e noted.



                                        



                                         



                                        



                                        There is marked fatty atrophy of the dis

hudson leg and foot musculature. 



                                        



                                        Severe flexor hallucis longus tendinopat

hy. No tenosynovitis.



                                        



                                         



                                        



                                        Left foot:



                                        



                                         



                                        



                                        There is a large area of soft tissue def

ect and granulation tissue at 



                                        



                                        the posteromedial aspect of the heel, re

aching the calcaneus, but 



                                        



                                        there is no drainable fluid collection. 

Deformity is noted in the 



                                        



                                        hindfoot, and the forefoot appears adduc

huma. No acute fracture. No 



                                        



                                        marrow signal abnormality is identified 

to indicate acute 



                                        



                                        osteomyelitis. There is no significant j

oint effusion.



                                        



                                         



                                        



                                        There is severe atrophy of the foot and 

distal leg musculature. No 



                                        



                                        significant tenosynovitis identified.



                                        



                                         



                                        



                                        IMPRESSION:



                                        



                                         



                                        



                                        Granulation/scar tissue at the medial as

pect of the heel bilaterally. 



                                        



                                        No MR evidence of osteomyelitis.



                                        



                                         



                                        



                                        Severe muscle atrophy.



                                        



                                         



                                        



                                        Signed: Jorge Cornejo MD



                                        



                                        Report Verified Date/Time:01/15/2020

 09:12:01



                                        



                                         



                                        



                                        Reading Location: [facility] C013X [facility] Reading Room



                                        



                          Electronically signed by: GAYLE HERBERT on 01/15/2020 



                                        09:12 AM



                                        



                                                    









                                        Procedure Note

 

                                        Interface, External Ris In - 01/15/2020 

 9:14 AM CST



FINAL REPORT



                                        



                                        PATIENT ID:   [id number]



                                        



                                        MRI of the right and left hindfoot witho

ut intravenous contrast



                                        



                                        History: Osteomyelitis, diabetic foot



                                        



                                        Comparison: Radiograph dated 2020



                                        



                                        Technique: Multiplanar multisequence MRI

 of the right and left



                                        hindfoot was performed without intraveno

us contrast using the



                                        hindfoot osteomyelitis protocol



                                        



                                        Findings:



                                        



                                        Right foot:



                                        



                                        Marked T1 and T2 hypointense soft tissue

 thickening is noted at the



                                        medial aspect of the right heel, likely 

to reflect scar tissue. There



                                        is also mild subcutaneous edema at the l

ateral aspect of the mid foot



                                        and forefoot, incompletely imaged. No di

screte drainable fluid



                                        collection is identified.



                                        



                                        There is no marrow signal abnormality to

 suggest osteomyelitis. There



                                        is a small nonspecific joint effusion at

 the ankle and subtalar



                                        joint. Scattered degenerative changes ar

e noted.



                                        



                                        There is marked fatty atrophy of the dis

hudson leg and foot musculature.



                                        Severe flexor hallucis longus tendinopat

hy. No tenosynovitis.



                                        



                                        Left foot:



                                        



                                        There is a large area of soft tissue def

ect and granulation tissue at



                                        the posteromedial aspect of the heel, re

aching the calcaneus, but



                                        there is no drainable fluid collection. 

Deformity is noted in the



                                        hindfoot, and the forefoot appears adduc

huma. No acute fracture. No



                                        marrow signal abnormality is identified 

to indicate acute



                                        osteomyelitis. There is no significant j

oint effusion.



                                        



                                        There is severe atrophy of the foot and 

distal leg musculature. No



                                        significant tenosynovitis identified.



                                        



                                        IMPRESSION:



                                        



                                        Granulation/scar tissue at the medial as

pect of the heel bilaterally.



                                        No MR evidence of osteomyelitis.



                                        



                                        Severe muscle atrophy.



                                        



                                        Signed: Jorge Cornejo MD



                                        Report Verified Date/Time:  01/15/2020 0

9:12:01



                                        



                                        Reading Location: [facility] C013X Ortho Con

sult Reading Room



                                                Electronically signed by: JORGE CORNEJO M.D. on 01/15/2020 09:12 AM



                                        









                Performing Organization Address         City/State/Zipcode Phone

 Number

 

                [facility]                                          



CBC with platelet count + automated diff (2020  6:26 AM CST)Only the most 
recent of4 resultswithin the time period is included.





                Component       Value           Ref Range       Performed At

 

                WBC             7.2             3.5 - 10.5 [facility]

[facility]

 

                RBC             5.48            4.63 - 6.08 M/L [facility]

 

                Hemoglobin      15.1            13.7 - 17.5 GM/DL [facility]

 

                Hematocrit      46.4            40.1 - 51.0 %   [facility]

 

                MCV             84.7            79.0 - 92.2 fL  [facility]

[facility]

 

                MCH             27.6            25.7 - 32.2 pg  [facility]

[facility]

 

                MCHC            32.5            32.3 - 36.5 GM/DL [facility]

 

                RDW             15.5 (H)        11.6 - 14.4 %   [facility]

 

                Platelets       315             150 - 450 K/CU MM [facility]

 

                MPV             10.5            9.4 - 12.4 fL   [facility]

 

                nRBC            0               0 - 0 /100 WBC  [facility]

 

                % Neutros       62              %               [facility]

[facility]

 

                % Lymphs        26              %               [facility]

[facility]

 

                % Monos         9               %               [facility]

 

                % Eos           2               %               [facility]

 

                % Baso          0               %               [facility]

 

                # Neutros       4.48            1.78 - 5.38 K/L [facility]

 

                # Lymphs        1.87            1.32 - 3.57 K/L [facility]

 

                # Monos         0.62            0.30 - 0.82 K/L [facility]

 

                # Eos           0.17            0.04 - 0.54 K/L [facility]

 

                # Baso          0.03            0.01 - 0.08 K/L [facility]

 

                Immature Granulocytes-Relative 1               0 - 1 %         C

[facility]









                                        Specimen

 

                                        Blood









                Performing Organization Address         [facility] Phone

 Number

 

                [facility] [address] [place]

 [address] [phone]



                CENTER                                          



Basic Metabolic Panel (2020  6:26 AM CST)Only the most recent of4 results
within the time period is included.





                Component       Value           Ref Range       Performed At

 

                Sodium          134 (L)         136 - 145 meq/L [facility]

 

                Potassium       4.6             3.5 - 5.1 meq/L [facility]

 

                Chloride        105             98 - 107 meq/L  [facility]

 

                CO2             20 (L)          22 - 29 meq/L   [facility]

 

                BUN             14              7 - 21 mg/dL    [facility]

 

                Creatinine      0.97            0.57 - 1.25 mg/dL [facility]

 

                Glucose         429 (HH)        70 - 105 mg/dL  [facility]

 

                Calcium         8.9             8.4 - 10.2 mg/dL Replaced by [facility]

[facility]

 

                EGFR            107Comment: ESTIMATED GFR IS mL/min/1.73 sq m [facility]



                                NOT AS ACCURATE AS CREATININE                 ME

DICAL CENTER



                                CLEARANCE IN PREDICTING                 



                                GLOMERULAR FILTRATION RATE.                 



                                ESTIMATED GFR IS NOT                 



                                APPLICABLE FOR DIALYSIS                 



                                PATIENTS.                       









                                        Specimen

 

                                        Blood









                          Narrative                 Performed At

 

                           ID - MAGAN F  [facility]

ICAL CENTER









                Performing Organization Address         City/State/Zipcode Phone

 Number

 

                [facility] [address] [place]

 [address] [phone]



                CENTER                                          



Islet Cell Ab Titer (01/10/2020  4:47 AM CST)





                Component       Value           Ref Range       Performed At

 

                Islet Cell Ab Titer TNP             LESS THAN 1.25  [facility]

[facility]



                                Comment:        JDF units       [facility]



                                Test Not Performed. Screening test Negative or N

ot Detected. Titer not                 



                                performed.                      



                                                                



                                NOTE:End point titers are compared to a sing

le international reference                 



                                standard and values are reported in JDF ([employer]

[employer]) units.                 









                                        Specimen

 

                                        Blood









                          Narrative                 Performed At

 

                                        Performing Lab



                                        [facility]

tute



                                        



                                         [address] [place] [address]



                                        



                           CRISTIANO Lundy MD, PhD, MARK 









                Performing Organization Address         City[facility] [place] Phone

 Number

 

                [facility] [place] [id number]

0 



                INCORPORATED    [address] [facility]                         



Islet Cell Ab Screen (01/10/2020  4:47 AM CST)





                Component       Value           Ref Range       Performed At

 

                Islet Cell Ab   NEGATIVE        NEGATIVE        [facility]

 [facility]



                                Comment:                        



                                                                



                                This test was developed and its analytical perfo

rmance characteristics have                 



                                been determined by [employer] [facility].                 



                                It has not been cleared or approved by FDA. This

 assay has been validated                 



                                pursuant to the CLIA regulations and is used for

 clinical purposes.                 









                                        Specimen

 

                                        Blood









                          Narrative                 Performed At

 

                                        Performing Lab



                                        [facility]

tute



                                        



                                         [address] [place] [address]



                                        



                           CRISTIANO Lundy MD, PhD, MARK 









                Performing Organization Address         City/State/[facility] [place] Phone

 Number

 

                [facility] [place] [place] [address]

0 



                INCORPORATED    [address] [facility]                         



CAMILA-65 (01/10/2020  4:47 AM CST)





                Component       Value           Ref Range       Performed At

 

                CAMILA-65          <5              <5 IU/mL        [facility]

 [facility]



                                Comment:                        



                                                                



                                This test was performed using the GAD65 EDIL [facility] which is standardized                 



                                against the International reference preparation 

97/550.                 









                                        Specimen

 

                                        Blood









                          Narrative                 Performed At

 

                                        Performing Lab



                                        [facility]



                                         [place]



                                        



                                         Telepartneri

tute



                                        



                                         [address] [facility] [address]



                                        



                           CRISTIANO Lundy MD, PhD, MARK 









                Performing Organization Address         City/State/Zipcode Phone

 Number

 

                [facility] [place], [place] [address]

0 



                INCORPORATED    [address] [facility]                         



Islet Cell AB Screen (01/10/2020  4:47 AM CST)





                Component       Value           Ref Range       Performed At

 

                Islet Cell Ab Profile Refer to individual Islet                 

[facility]



                                Cell Ab and/or Islet Cell                 [facility]

[facility]ATED



                                Ab Titer results.                 









                                        Specimen

 

                                        Blood









                Performing Organization Address         City/[facility] Phone

 Number

 

                [facility], [place], [place] [address]

0 



                INCORPORATED    [address] [facility]                         



Hemoglobin A1c (01/10/2020  4:47 AM CST)Only the most recent of3 resultswithin 
the time period is included.





                Component       Value           Ref Range       Performed At

 

                Hemoglobin A1C  10.4 (H)        4.3 - 6.1 %     [facility]









                                        Specimen

 

                                        Blood









                Performing Organization Address         City/[facility] Phone

 Number

 

                [address]

 [address] [phone]



                [place]                                          



Lipid panel (01/10/2020  4:47 AM CST)Only the most recent of3 resultswithin the 
time period is included.





                Component       Value           Ref Range       Performed At

 

                Triglycerides   692             mg/dL           [facility]

 

                Cholesterol     196             mg/dL           [facility]

 

                HDL             24              mg/dL           [facility]









                                        Specimen

 

                                        Blood









                          Narrative                 Performed At

 

                          Calculated LDL not valid if triglyceride >400 [facility]



                                        mg/dL



                                        



                                        Triglyceride Reference Range:



                                        



                                         Low Risk <150



                                        



                                         [id number]



                                        



                                         High Risk 200-499



                                        



                                         Very High Risk>=500



                                        



                                         



                                        



                                        Cholesterol Reference Range:



                                        



                                         Low Risk <200



                                        



                                         [id number] 



                                        



                                         High Risk>240



                                        



                                         



                                        



                                        HDL Cholesterol Reference Range:



                                        



                                         Low Risk >=60



                                        



                                         High Risk <40



                                        



                                         



                                        



                                        LDL Cholesterol Reference Range:



                                        



                                         Optimal<100



                                        



                                         Near [id number]



                                        



                                         [id number]



                                        



                                         [id number]



                                        



                                         Very High >=190 



                                        



                                         



                                        



                           ID - [id number]         









                Performing Organization Address         [facility]

 Number

 

                [address]

 [address] [phone]



                [place]                                          



PERIPHERAL VASCULAR REPORT - SCAN (2020  9:24 PM CST)





                          Narrative                 Performed At

 

                                        This result has an attachment that is no

t available.



                                        



Blood culture (2020 11:47 AM CST)Only the most recent of2 resultswithin 
the time period is included.





                Component       Value           Ref Range       Performed At

 

                Result          No growth in 5 days                 [facility]









                                        Specimen

 

                                        Blood









                Performing Organization Address         City/State/Zipcode Phone

 Number

 

                [facility] [address] [place]

 [address] [phone]



                CENTER                                          



Arterial doppler legs bilateral (2020  9:35 AM CST)





                Component       Value           Ref Range       Performed At

 

                Ejection Fraction                                 [facility] ECHO HEAR

[facility]









                                        Specimen

 

                                        









                          Impressions               Performed At

 

                                        Right Impression



                                        [facility] ECHO HEARTLAB [facility]



                          1. The common femoral, profunda femoral, 



                                        superficial femoral, popliteal,



                                        



                          posterior tibial and anterior tibial arteries are 



                                        patent with normal



                                        



                                        triphasic Doppler waveforms.



                                        



                                        2. The peroneal artery is not visualized

.



                                        



                          3. The PT and DP ROC's are not obtained due to 



                                        non-Dopplerable signals,



                                        



                                        swelling and inability to position the l

egs.



                                        



                          4. The great toe pressure and TBI are not obtained 



                                        due to size of the great



                                        



                                        toe and swelling.



                                        



                                        5. The digits have adequate flow by PPG 

waveforms.



                                        



                                        Left Impression



                                        



                          1. The common femoral, profunda femoral, 



                                        superficial femoral, popliteal,



                                        



                          posterior tibial and anterior tibial arteries are 



                                        patent with normal



                                        



                                        triphasic Doppler waveforms.



                                        



                                        2. The peroneal artery is not visualized

.



                                        



                          3. The PT and DP ROC's are not obtained due to 



                                        non-Dopplerable signals,



                                        



                                        swelling and inability to position the l

egs.



                                        



                          4. The great toe pressure and TBI are not obtained 



                                        due to size of the great



                                        



                                        toe and swelling.



                                        



                                        5. The digits have adequate flow by PPG 

waveforms.



                                        



                                         



                                        



                                         Conclusions



                                        



                                         



                                        



                                         Summary



                                        



                                         



                                        



                           Arterial pressures and Doppler waveforms were 



                                        performed bilaterally. The



                                        



                           arterial exam was technically difficult due to 



                                        edema, body habitus and



                                        



                           inability to position the legs, however, adequate 



                                        Doppler waveforms were



                                        



                           obtained. The right and left arterial systems 



                                        were patent with triphasic



                                        



                           Doppler waveforms and no evidence of obstruction. 



                                        The peroneal artery was



                                        



                           not visualized, bilaterally. The right and left 



                                        ROC's were not obtained



                                        



                           due to non-Dopplerable signals, swelling and 



                                        inability to position the



                                        



                           legs. The toe pressure and TBI's were not 



                                        obtained due to size of the



                                        



                           great toe and swelling, bilaterally. The digits 



                                        had adequate flow by PPG



                                        



                                         waveforms bilaterally.



                                        



                                         



                                        



                                         Signature



                                        



                                         



                                        



                           ------------------------------------------------- 



                                        ---------------



                                        



                           Electronically signed by Radha Freedman MD(Interpreting



                                        



                                         physician) on 2020 02:52 PM



                                        



                           ------------------------------------------------- 



                                        ---------------



                                        



                                         



                                        



                          Velocities are measured in cm/s ; Diameters are 



                                        measured in cm



                                        



                                         



                                        



                                        LE Duplex Measurements



                                        



                                         



                                        



                           



                           



                          Right 



                           



                           



                                        Left



                                        



                                         



                                        



                           +------------------------------------------------ 



                          --------------+           



                          +------------------+-----------------+------------ 



                          -------------+            



                          +------------------+-----------------+------------ 



                                        -------------+



                                        



                           !Location 



                           



                          !       



                          !PSV 



                          !EDV!Waveform 



                           ! 



                          !PSV 



                          !EDV!Waveform 



                                         !



                                        



                           +------------------------------------------------ 



                          --------------+           



                          +------------------+-----------------+------------ 



                          -------------+            



                          +------------------+-----------------+------------ 



                                        -------------+



                                        



                           !Mid Common              



                          Femoral 



                          ! 



                          !149 



                          ! 



                          !Triphasic! 



                          !176 



                          ! 



                                        !Triphasic

!



                                        



                           +------------------------------------------------ 



                          --------------+           



                          +------------------+-----------------+------------ 



                          -------------+            



                          +------------------+-----------------+------------ 



                                        -------------+



                                        



                           !Prox                    



                          PFA 



                           



                          !              



                          !69.2! 



                                    



                          !Triphasic! 



                          !81.3! 



                                    



                                        !Triphasic

!



                                        



                           +------------------------------------------------ 



                          --------------+           



                          +------------------+-----------------+------------ 



                          -------------+            



                          +------------------+-----------------+------------ 



                                        -------------+



                                        



                           !Prox                    



                          SFA 



                           



                          ! !129 



                          ! 



                          !Triphasic! 



                          !130 



                          ! 



                                        !Triphasic

!



                                        



                           +------------------------------------------------ 



                          --------------+           



                          +------------------+-----------------+------------ 



                          -------------+            



                          +------------------+-----------------+------------ 



                                        -------------+



                                        



                           !Mid                     



                          SFA 



                           



                           ! !125 



                          ! 



                          !Triphasic! 



                          !89.8! 



                                    



                                        !Triphasic

!



                                        



                           +------------------------------------------------ 



                          --------------+           



                          +------------------+-----------------+------------ 



                          -------------+            



                          +------------------+-----------------+------------ 



                                        -------------+



                                        



                           !Dist                    



                          SFA 



                           



                          !              



                          !96.6! 



                                    



                          !Triphasic! 



                          !83.7! 



                                    



                                        !Triphasic

!



                                        



                           +------------------------------------------------ 



                          --------------+           



                          +------------------+-----------------+------------ 



                          -------------+            



                          +------------------+-----------------+------------ 



                                        -------------+



                                        



                           !Prox                    



                          Popliteal 



                           



                          ! !106 



                          ! 



                          !Triphasic! 



                          !96.6! 



                                    



                                        !Triphasic

!



                                        



                           +------------------------------------------------ 



                          --------------+           



                          +------------------+-----------------+------------ 



                          -------------+            



                          +------------------+-----------------+------------ 



                                        -------------+



                                        



                           !Dist                    



                          Popliteal 



                           



                          !                    



                          !81.9! 



                                    



                          !Triphasic! 



                          !127 



                          ! 



                                        !Triphasic

!



                                        



                           +------------------------------------------------ 



                          --------------+           



                          +------------------+-----------------+------------ 



                          -------------+            



                          +------------------+-----------------+------------ 



                                        -------------+



                                        



                           !Prox                    



                          PTA 



                           



                          !              



                          !93.5! 



                                    



                          !Triphasic! 



                          !140 



                          ! 



                                        !Triphasic

!



                                        



                           +------------------------------------------------ 



                          --------------+           



                          +------------------+-----------------+------------ 



                          -------------+            



                          +------------------+-----------------+------------ 



                                        -------------+



                                        



                           !Mid                     



                          PTA 



                           



                           !             



                          !83.1! 



                                    



                          !Triphasic! 



                          !64.4! 



                                    



                                        !Triphasic

!



                                        



                           +------------------------------------------------ 



                          --------------+           



                          +------------------+-----------------+------------ 



                          -------------+            



                          +------------------+-----------------+------------ 



                                        -------------+



                                        



                           !Dist                    



                          PTA 



                           



                          ! !130 



                          ! 



                          !Triphasic! 



                          !109 



                          ! 



                                        !Triphasic

!



                                        



                           +------------------------------------------------ 



                          --------------+           



                          +------------------+-----------------+------------ 



                          -------------+            



                          +------------------+-----------------+------------ 



                                        -------------+



                                        



                           !Prox                    



                          [facility] 



                           



                          ! !125 



                          ! 



                          !Triphasic! 



                          !92.9! 



                                    



                                        !Triphasic

!



                                        



                           +------------------------------------------------ 



                          --------------+           



                          +------------------+-----------------+------------ 



                          -------------+            



                          +------------------+-----------------+------------ 



                                        -------------+



                                        



                           !Mid                     



                          [facility] 



                           



                           ! !144 



                          ! 



                          !Triphasic! 



                          !61.1! 



                                    



                                        !Triphasic

!



                                        



                           +------------------------------------------------ 



                          --------------+           



                          +------------------+-----------------+------------ 



                          -------------+            



                          +------------------+-----------------+------------ 



                                        -------------+



                                        



                           !Dist                    



                          [facility] 



                           



                                        !



                                        



                           +------------------------------------------------ 



                                        --------------+



                                        



                                                    









                          Narrative                 Performed At

 

                                        PV LAB - Lower Extremity Arterial Duplex



                                        [facility]



                                         



                                        



                                         Demographics



                                        



                                         



                                        



                           Patient NameIjeoma DALE of Study 

2020



                                        



                                         



                                        



                           MRN 



                          [id number] 



                                         34



                                        



                                         



                                        



                           Visit                    



                          [id number] 



                                        Male



                                        



                                         



                                        



                           Accession                



                          [id number] of 



                                        Birth 1985



                                        



                                         



                                        



                           Referring Rafi Kelsianisa 



                                        Room Number 2211



                                        



                                         Physician



                                        



                                         



                                        



                           Sonographer Be Freedman



                                        



                           



                          S 



                                        Physician MD



                                        



                                         



                                        



                                        Procedure



                                        



                                        Type of Study:



                                        



                                         



                                        



                           Extremities Arteries: Lower Extremities Arterial 



                                        Duplex, ARTERIAL DOPPLER



                                        



                                         LEGS, BILATERAL.



                                        



                                         



                                        



                                        Indications for Study:Heel ulcer.



                                        



                                         



                                        



                                        Patient Status:Routine.



                                        



                                        Study Location:Vascular Lab.



                                        



                                        Technical Quality:Technically Difficult.



                                        



                                         



                                        



                                        Risk Factors



                                        



                                        History of Disease



                                        



                          +----------------+----------+--------------------- 



                                        -------------------------+



                                        



                          !Diagnosis    



                          !Date!Comments 



                           



                                        !



                                        



                          +----------------+----------+--------------------- 



                                        -------------------------+



                                        



                          !History/Risk!2020!Current smoker, 



                                        Morbidly obese, Spina bifida, !



                                        



                          !Factors:!!Cer 



                          ebral palsy, Pressure     



                                        ulcer!



                                        



                          +----------------+----------+--------------------- 



                                        -------------------------+



                                        



                                                    









                                        Procedure Note

 

                                        Interface, External Ris In - 2020 

 2:52 PM CST



[facility] LAB - Lower Extremity Arterial Duplex



                                        



                                         Demographics



                                        



                                         Patient Name      JORDAN DALE    Carlos

e of Study       2020



                                        



                                         MRN               [id number]          Age

                 34



                                        



                                         Visit Number      [id number]        Gen

keila              Male



                                        



                                         Accession Number  [id number]          Carlos

e of Birth       1985



                                        



                                         Referring         Rafi Hunt

m Number         [id number]



                                         Physician



                                        



                                         Sonographer       Be [place]  Int

[facility]        Radha Freedman,



                                                           [place]               Wily clancy MD



                                        



                                        Procedure



                                        Type of Study:



                                        



                                         Extremities Arteries: Lower Extremities

 Arterial Duplex, ARTERIAL DOPPLER



                                         LEGS, BILATERAL.



                                        



                                        Indications for Study:Heel ulcer.



                                        



                                        Patient Status:Routine.



                                        Study Location:Vascular Lab.



                                        Technical Quality:Technically Difficult.



                                        



                                        Risk Factors



                                        History of Disease



                                        +----------------+----------+-----------

-----------------------------------+



                                        !Diagnosis       !Date      !Comments   

                                   !



                                        +----------------+----------+-----------

-----------------------------------+



                                        !History/Risk    !2020!Current smo

ker, Morbidly obese, Spina bifida, !



                                        !Factors:        !          !Cerebral pa

lsy, Pressure ulcer                !



                                        +----------------+----------+-----------

-----------------------------------+



                                        



                                        Impressions



                                        Right Impression



                                        1. The common femoral, profunda femoral,

 superficial femoral, popliteal,



                                        posterior tibial and anterior tibial art

eries are patent with normal



                                        triphasic Doppler waveforms.



                                        2. The peroneal artery is not visualized

.



                                        3. The PT and DP ROC's are not obtained 

due to non-Dopplerable signals,



                                        swelling and inability to position the l

egs.



                                        4. The great toe pressure and TBI are no

t obtained due to size of the great



                                        toe and swelling.



                                        5. The digits have adequate flow by PPG 

waveforms.



                                        Left Impression



                                        1. The common femoral, profunda femoral,

 superficial femoral, popliteal,



                                        posterior tibial and anterior tibial art

eries are patent with normal



                                        triphasic Doppler waveforms.



                                        2. The peroneal artery is not visualized

.



                                        3. The PT and DP ROC's are not obtained 

due to non-Dopplerable signals,



                                        swelling and inability to position the l

egs.



                                        4. The great toe pressure and TBI are no

t obtained due to size of the great



                                        toe and swelling.



                                        5. The digits have adequate flow by PPG 

waveforms.



                                        



                                         Conclusions



                                        



                                         Summary



                                        



                                         Arterial pressures and Doppler waveform

s were performed bilaterally. The



                                         arterial exam was technically difficult

 due to edema, body habitus and



                                         inability to position the legs, however

, adequate Doppler waveforms were



                                         obtained. The right and left arterial s

ystems were patent with triphasic



                                         Doppler waveforms and no evidence of ob

struction. The peroneal artery was



                                         not visualized, bilaterally. The right 

and left ROC's were not obtained



                                         due to non-Dopplerable signals, swellin

g and inability to position the



                                         legs. The toe pressure and TBI's were n

ot obtained due to size of the



                                         great toe and swelling, bilaterally. Th

e digits had adequate flow by PPG



                                         waveforms bilaterally.



                                        



                                         Signature



                                        



                                         ---------------------------------------

-------------------------



                                         Electronically signed by Radha fuentes MD(Interpreting



                                         physician) on 2020 02:52 PM



                                         ---------------------------------------

-------------------------



                                        



                                        Velocities are measured in cm/s ; Diamet

ers are measured in cm



                                        



                                        LE Duplex Measurements



                                        



                                                                                

                          Right         

                                                  Left



                                        



                                         +--------------------------------------

------------------------+ 

+------------------+-----------------+-------------------------+ 
+------------------+-----------------+-------------------------+



                                         !Location                              

                        ! !PSV          

    !EDV              !Waveform                 ! !PSV               !EDV       
      !Waveform                 !



                                         +--------------------------------------

------------------------+ 

+------------------+-----------------+-------------------------+ 
+------------------+-----------------+-------------------------+



                                         !Mid Common Femoral                    

                        ! !149          

    !                 !Triphasic                ! !176               !          
      !Triphasic                !



                                         +--------------------------------------

------------------------+ 

+------------------+-----------------+-------------------------+ 
+------------------+-----------------+-------------------------+



                                         !Prox PFA                              

                        ! !69.2         

    !                 !Triphasic                ! !81.3              !          
      !Triphasic                !



                                         +--------------------------------------

------------------------+ 

+------------------+-----------------+-------------------------+ 
+------------------+-----------------+-------------------------+



                                         !Prox SFA                              

                        ! !129          

    !                 !Triphasic                ! !130               !          
      !Triphasic                !



                                         +--------------------------------------

------------------------+ 

+------------------+-----------------+-------------------------+ 
+------------------+-----------------+-------------------------+



                                         !Mid SFA                               

                        ! !125          

    !                 !Triphasic                ! !89.8              !          
      !Triphasic                !



                                         +--------------------------------------

------------------------+ 

+------------------+-----------------+-------------------------+ 
+------------------+-----------------+-------------------------+



                                         !Dist SFA                              

                        ! !96.6         

    !                 !Triphasic                ! !83.7              !          
      !Triphasic                !



                                         +--------------------------------------

------------------------+ 

+------------------+-----------------+-------------------------+ 
+------------------+-----------------+-------------------------+



                                         !Prox Popliteal                        

                        ! !106          

    !                 !Triphasic                ! !96.6              !          
      !Triphasic                !



                                         +--------------------------------------

------------------------+ 

+------------------+-----------------+-------------------------+ 
+------------------+-----------------+-------------------------+



                                         !Dist Popliteal                        

                        ! !81.9         

    !                 !Triphasic                ! !127               !          
      !Triphasic                !



                                         +--------------------------------------

------------------------+ 

+------------------+-----------------+-------------------------+ 
+------------------+-----------------+-------------------------+



                                         !Prox PTA                              

                        ! !93.5         

    !                 !Triphasic                ! !140               !          
      !Triphasic                !



                                         +--------------------------------------

------------------------+ 

+------------------+-----------------+-------------------------+ 
+------------------+-----------------+-------------------------+



                                         !Mid PTA                               

                        ! !83.1         

    !                 !Triphasic                ! !64.4              !          
      !Triphasic                !



                                         +--------------------------------------

------------------------+ 

+------------------+-----------------+-------------------------+ 
+------------------+-----------------+-------------------------+



                                         !Dist PTA                              

                        ! !130          

    !                 !Triphasic                ! !109               !          
      !Triphasic                !



                                         +--------------------------------------

------------------------+ 

+------------------+-----------------+-------------------------+ 
+------------------+-----------------+-------------------------+



                                         !Prox MARY CARMEN                              

                        ! !125          

    !                 !Triphasic                ! !92.9              !          
      !Triphasic                !



                                         +--------------------------------------

------------------------+ 

+------------------+-----------------+-------------------------+ 
+------------------+-----------------+-------------------------+



                                         !Mid MARY CARMEN                               

                        ! !144          

    !                 !Triphasic                ! !61.1              !          
      !Triphasic                !



                                         +--------------------------------------

------------------------+ 

+------------------+-----------------+-------------------------+ 
+------------------+-----------------+-------------------------+



                                         !Dist MARY CARMEN                              

                        !



                                         +--------------------------------------

------------------------+



                                        









                Performing Organization Address         [facility]

 Number

 

                [facility] MK[facility]ESS[facility] CP[facility]                              

   



Manual Differential (2020  4:07 AM CST)





                Component       Value           Ref Range       Performed At

 

                % Neutros (manual) 69              %               [facility]

 

                % Lymphs (manual) 25              %               [facility]

 

                % Monos (manual) 3               %               [facility]

 

                % Eos (manual)  3               %               [facility]

 

                % Baso (manual) 0               %               [facility]

 

                # Neutros (manual) 4.62            1.80 - 8.00 K/L [facility]

 

                # Lymphs (manual) 1.68            1.48 - 4.50 K/L [facility]

 

                # Monos (manual) 0.20            0.00 - 1.30 K/L [facility]

 

                # Eos (manual)  0.20            0.00 - 0.50 K/L [facility]

 

                # Baso (manual) 0.00            0.00 - 0.20 K/L [facility]

 

                Total Counted   100                             [facility]

 

                Platelet Morphology Normal                          [facility]

 

                RBC Morphology  Normal                          [facility]

 

                Smudge Cells    Present                         [facility]









                                        Specimen

 

                                        Blood









                Performing Organization Address         City/[facility]/Zipcode Phone

 Number

 

                [facility] [address] [place]

 [address] [phone]



                CENTER                                          



US abdomen limited (2020  6:45 PM CST)





                                        Specimen

 

                                        









                          Narrative                 Performed At

 

                                        FINAL REPORT



                                        [facility]



                                         



                                        



                                        PATIENT ID: [id number]



                                        



                                         



                                        



                                        Limited abdominal ultrasound.



                                        



                                         



                                        



                                        CLINICAL HISTORY: Evaluation of  shunt

 for possible cyst or 



                                        



                                        pseudocyst.



                                        



                                         



                                        



                                        COMPARISON STUDY: None available.



                                        



                                         



                                        



                                        FINDINGS: Sonographic assessment of the 

abdomen was performed 



                                        



                                        assessing for fluid in the region of the

 patient's shunts.



                                        



                                         



                                        



                                        No fluid collections are seen. However, 

the study is limited by the 



                                        



                                        patient's body habitus. CT scan would be

 more sensitive.



                                        



                                         



                                        



                                        Signed: Brandyn Hendrickson MD



                                        



                                        Report Verified Date/Time:2020

 19:54:10



                                        



                                         



                                        



                                        Reading Location: [facility] C013W Consult R

eading Room



                                        



                          Electronically signed by: BRANDYN HENDRICKSON M.D. on 2020 



                                        07:54 PM



                                        



                                                    









                                        Procedure Note

 

                                        Interface, External Ris In - 2020 

 7:57 PM CST



FINAL REPORT



                                        



                                        PATIENT ID:   [id number]



                                        



                                        Limited abdominal ultrasound.



                                        



                                        CLINICAL HISTORY: Evaluation of  shunt

 for possible cyst or



                                        pseudocyst.



                                        



                                        COMPARISON STUDY: None available.



                                        



                                        FINDINGS: Sonographic assessment of the 

abdomen was performed



                                        assessing for fluid in the region of the

 patient's shunts.



                                        



                                        No fluid collections are seen. However, 

the study is limited by the



                                        patient's body habitus. CT scan would be

 more sensitive.



                                        



                                        Signed: Brandyn Hendrickson MD



                                        Report Verified Date/Time:  2020 1

9:54:10



                                        



                                        Reading Location: [facility] C013W Consult R

eading Room



                                              Electronically signed by: BRANDYN RAZO M.D. on 2020 07:54 PM



                                        









                Performing Organization Address         City/State/Zipcode Phone

 Number

 

                [facility]                                          



Manual Differential (2020  4:50 PM CST)





                Component       Value           Ref Range       Performed At

 

                % Neutros       63              %               [facility]

[facility]

 

                % Lymphs        23              %               [facility]

[facility]

 

                % Monos         6               %               [facility]

[facility]

 

                % Eos           5               %               [facility]

[facility]

 

                % Baso          1               %               [facility]

[facility]

 

                % Bands         2               0 - 10 %        [facility]

[facility]

 

                # Neutros       4.91            1.78 - 5.38 K/ul [facility]

[facility]

 

                # Lymphs        1.79            1.32 - 3.57 K/ul [facility]

[facility]

 

                # Monos         0.47            0.30 - 0.82 K/uL [facility]

[facility]

 

                # Eos           0.39            0.04 - 0.54 K/uL [facility]

[facility]

 

                # Baso          0.08            0.01 - 0.08 K/uL [facility]

[facility]

 

                # Bands         0.16            0.00 - 0.80 K/uL [facility]

[facility]

 

                Total Counted   100                             [facility]

[facility]

 

                Smudge Cells    Present                         [facility]

[facility]

 

                Giant Platelet  Present                         [facility]

[facility]

 

                Anisocytosis    1+ few                          [facility]

[facility]

 

                Poikilocytes    2+ moderate                     [facility]

[facility]

 

                Spherocytes     1+ few                          [facility]

[facility]

 

                Ovalocytes      1+ few                          [facility]

[facility]

 

                Tear Drop Cells 1+ few                          [facility]

[facility]

 

                Artifact        Present                         [facility]

[facility]

 

                Platelet Conc   Adequate                        [facility]

[facility]









                                        Specimen

 

                                        Blood









                          Narrative                 Performed At

 

                                        Received comment: 



                                        [facility]



                                        User comments: 



                                        



                          Slide comments:           









                Performing Organization Address         City/State/Zipcode Phone

 Number

 

                [facility] [address] [place]

 [address] [phone]



                CENTER                                          



Urinalysis w/Microscopic + Reflex to Culture (2020  4:40 PM CST)





                Component       Value           Ref Range       Performed At

 

                Color, UA       Yellow                          [facility]

[facility]

 

                Clarity, UA     Hazy                            [facility]

[facility]

 

                Specific Gravity, UA 1.020           1.001 - 1.035   [facility]

 

                pH, UA          6.0             5.0 - 8.0       [facility]

[facility]

 

                Protein, UA     20 mg/dL (A)    Negative        [facility]

[facility]

 

                Glucose, UA     >1000 mg/dL (A) Negative        [facility]

 

                Ketones, UA     40 mg/dL (A)    Negative        [facility]

 

                Bilirubin, UA   Negative        Negative        [facility]

 

                Blood, UA       Moderate (A)    Negative        [facility]

 

                Nitrite, UA     Positive (A)    Negative        [facility]

 

                Leukocytes, UA  Large (A)       Negative        [facility]

 

                Urobilinogen, UA 0.2             0.2 - 1.0 mg/dL Replaced by [facility]

[facility]

 

                RBC, UA         0               /HPF            [facility]

 

                WBC, UA         267             /HPF            [facility]

 

                Bacteria, UA    Moderate                        [facility]

 

                Specimen Source                                 [facility]









                                        Specimen

 

                                        Urine - Urine, Suprapubic Aspirate









                Performing Organization Address         City/State/Zipcode Phone

 Number

 

                [facility] [address] [place]

 [address] [phone]



                [place]                                          



CT brain without IV contrast (2020  3:26 PM CST)





                                        Specimen

 

                                        









                          Narrative                 Performed At

 

                                        FINAL REPORT



                                        [facility]



                                         



                                        



                                        PATIENT ID: [id number]



                                        



                                         



                                        



                                        CT, BRAIN, WITHOUT CONTRAST



                                        



                                         



                                        



                                        CLINICAL INDICATION:Hydrocephalus



                                        



                                         



                                        



                                        COMPARISON: None



                                        



                                         



                                        



                                        TECHNIQUE:Noncontrast axial CT imagi

ng of the brain and skull. 



                                        



                                         



                                        



                                        DOSE REDUCTION: Dose modulation, iterati

ve reconstruction, and/or 



                                        



                                        weight-based adjustment of the mA/kV was

 utilized to reduce the 



                                        



                                        radiation dose to as low as reasonably a

chievable.



                                        



                                         



                                        



                                        FINDINGS:



                                        



                                        A total of two ventricular drainage cath

eters are present. A right 



                                        



                                        transverse temporal catheter terminates 

across the midline just above 



                                        



                                        the level of the foramen of Monro. A rig

ht parietal approach catheter 



                                        



                                        terminates in the pineal region. Suprate

ntorial ventricular 



                                        



                                        configuration is slitlike. There is no h

erniation. The basilar 



                                        



                                        cisterns are preserved. There is no iden

tifiable recent infarct. 



                                        



                                        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

cipital lobes. There is 



                                        



                                        partial callosal agenesis. Calvarial con

figuration escape of 



                                        



                                        cephalic. There is no acute osseous abno

rmality.



                                        



                                         



                                        



                                         



                                        



                                        IMPRESSION:



                                        



                                         



                                        



                                        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

hanges without recent infarct 



                                        



                                        or hemorrhage.



                                        



                                         



                                        



                                        A total of two ventricular drainage cath

eters are present. 



                                        



                                        Supratentorial ventricular configuration

 is slitlike and may 



                                        



                                        represent over shunting. Correlation rec

ommended.



                                        



                                         



                                        



                                        Signed: JR Rae Robert MD



                                        



                                        Report Verified Date/Time:2020

 15:31:08



                                        



                                         



                                        



                                        Reading Location: [facility] C013[facility] Room



                                        



                          Electronically signed by: ALONDRA COBURN

 on 2020 



                                        03:31 PM



                                        



                                                    









                                        Procedure Note

 

                                        Interface, External Ris In - 2020 

 3:33 PM CST



FINAL REPORT



                                        



                                        PATIENT ID:   [id number]



                                        



                                        CT, BRAIN, WITHOUT CONTRAST



                                        



                                        CLINICAL INDICATION:  Hydrocephalus



                                        



                                        COMPARISON: None



                                        



                                        TECHNIQUE:  Noncontrast axial CT imaging

 of the brain and skull.



                                        



                                        DOSE REDUCTION: Dose modulation, iterati

ve reconstruction, and/or



                                        weight-based adjustment of the mA/kV was

 utilized to reduce the



                                        radiation dose to as low as reasonably a

chievable.



                                        



                                        FINDINGS:



                                        A total of two ventricular drainage cath

eters are present. A right



                                        transverse temporal catheter terminates 

across the midline just above



                                        the level of the foramen of Monro. A rig

ht parietal approach catheter



                                        terminates in the pineal region. Suprate

ntorial ventricular



                                        configuration is slitlike. There is no h

erniation. The basilar



                                        cisterns are preserved. There is no iden

tifiable recent infarct.



                                        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

cipital lobes. There is



                                        partial callosal agenesis. Calvarial con

figuration escape of



                                        cephalic. There is no acute osseous abno

rmality.



                                        



                                        



                                        IMPRESSION:



                                        



                                        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

hanges without recent infarct



                                        or hemorrhage.



                                        



                                        A total of two ventricular drainage cath

eters are present.



                                        Supratentorial ventricular configuration

 is slitlike and may



                                        represent over shunting. Correlation rec

ommended.



                                        



                                        Signed: JR Rae Robert MD



                                        Report Verified Date/Time:  2020 1

5:31:08



                                        



                                        Reading Location: [facility] Room



                                            Electronically signed by: ALONDRA RAE on 2020 03:31 PM



                                        









                Performing Organization Address         City/State/Zipcode Phone

 Number

 

                [facility]                                          



XR shunt series (2020  2:49 PM CST)





                                        Specimen

 

                                        









                          Narrative                 Performed At

 

                                        FINAL REPORT



                                        [facility]



                                         



                                        



                                        PATIENT ID: [id number]



                                        



                                         



                                        



                                        RAD, SHUNT SERIES



                                        



                                         



                                        



                                        INDICATION: concern for VPS malfunction



                                        



                                         



                                        



                                        COMPARISON: None



                                        



                                         



                                        



                                        TECHNIQUE: AP and lateral radiographs of

 the skull, abdomen and chest 



                                        



                                        were acquired to evaluate shunt catheter



                                        



                                         



                                        



                                        FINDINGS:



                                        



                                        An abandoned shunt catheter is present w

ithin the right neck. Medial 



                                        



                                        to this a second shunt catheter is prese

nt which descends along the 



                                        



                                        left chest wall, terminating within the 

mid pelvis. Radiopaque 



                                        



                                        portions of the catheter appear contiguo

us.



                                        



                                         



                                        



                                        Signed: Colby Tan MD



                                        



                                        Report Verified Date/Time:2020

 15:13:54



                                        



                                         



                                        



                                        Reading Location:  [facility]

y Reading Room



                                        



                          Electronically signed by: COLBY TAN MD on 2020 



                                        03:13 PM



                                        



                                                    









                                        Procedure Note

 

                                        Interface, External Ris In - 2020 

 3:16 PM CST



FINAL REPORT



                                        



                                        PATIENT ID:   [id number]



                                        



                                        RAD, SHUNT SERIES



                                        



                                        INDICATION: concern for VPS malfunction



                                        



                                        COMPARISON: None



                                        



                                        TECHNIQUE: AP and lateral radiographs of

 the skull, abdomen and chest



                                        were acquired to evaluate shunt catheter



                                        



                                        FINDINGS:



                                        An abandoned shunt catheter is present w

ithin the right neck. Medial



                                        to this a second shunt catheter is prese

nt which descends along the



                                        left chest wall, terminating within the 

mid pelvis. Radiopaque



                                        portions of the catheter appear contiguo

us.



                                        



                                        Signed: Colby Tan MD



                                        Report Verified Date/Time:  2020 1

5:13:54



                                        



                                        Reading Location: [facility]

y Reading Room



                                          Electronically signed by: COLBY TAN MD on 2020 03:13 PM



                                        









                Performing Organization Address         City/State/Zipcode Phone

 Number

 

                [facility]                                          



TSH/Free T4 If Indicated (2020  7:08 AM CST)





                Component       Value           Ref Range       Performed At

 

                TSH             1.40            0.35 - 4.94 uIU/mL [facility]









                                        Specimen

 

                                        Blood









                Performing Organization Address         City/State/Zipcode Phone

 Number

 

                [facility] [address] [place] [address]

 [address] [phone]



                CENTER                                          



Wound culture + gram stain (2020 12:27 AM CST)





                Component       Value           Ref Range       Performed At

 

                Result          1+ Proteus mirabilis (A)                 [facility]

 

                Result          1+ Methicillin resistant                 [facility]



                                Staphylococcus aureus (A)                 [facility]

L CENTER

 

                Result          <1+ Escherichia coli                 [facility]



                                (A)Comment: ESBL Positive                 [facility]

L CENTER

 

                Result          Beta-hemolytic streptococcus                 [facility]



                                group B, by serological                 MEDICAL 

CENTER



                                grouping (A)                    

 

                Gram Stain Result 3+ WBCs                         [facility]

 

                Gram Stain Result 1+ gram negative rods                 [facility]

 

                Gram Stain Result 2+ gram positive rods                 [facility]

 

                Gram Stain Result <1+ gram negative coccobacilli                

 [facility]

 

                Gram Stain Result 2+ gram positive cocci in pairs               

  [facility]









                                        Specimen

 

                                        Wound









                Organism        Antibiotic      Method          Susceptibility

 

                Proteus mirabilis Amikacin                        <=2: Susceptib

le

 

                Proteus mirabilis Ampicillin + Sulbactam                 8: Susc

eptible

 

                Proteus mirabilis Aztreonam                       <=1: Susceptib

le

 

                Proteus mirabilis Cefepime                        <=1: Susceptib

le

 

                Proteus mirabilis Cefoxitin                       32: Resistant

 

                Proteus mirabilis Ceftazidime                     <=1: Susceptib

le

 

                Proteus mirabilis Ceftriaxone                     <=1: Susceptib

le

 

                Proteus mirabilis Ertapenem                       <=0.5: Suscept

ible

 

                Proteus mirabilis Gentamicin                      <=1: Susceptib

le

 

                Proteus mirabilis Levofloxacin                    >=8: Resistant

 

                Proteus mirabilis Meropenem                       <=0.25: Suscep

tible

 

                Proteus mirabilis Piperacillin + Tazobactam                 <=4:

 Susceptible

 

                Proteus mirabilis Tetracycline                    >=16: Resistan

t

 

                Proteus mirabilis Tobramycin                      <=1: Susceptib

le

 

                Proteus mirabilis Trimethoprim +                  >=320: Resista

nt



                                Sulfamethoxazole                 

 

                Methicillin resistant Clindamycin                     >=4: Resis

tant



                Staphylococcus aureus                                 

 

                Methicillin resistant Erythromycin                    >=8: Resis

tant



                Staphylococcus aureus                                 

 

                Methicillin resistant Linezolid                       2: Suscept

ible



                Staphylococcus aureus                                 

 

                Methicillin resistant Oxacillin                       >=4: Resis

tant



                Staphylococcus aureus                                 

 

                Methicillin resistant Rifampin                        <=0.5: Trena

ceptible



                Staphylococcus aureus                                 

 

                Methicillin resistant Tetracycline                    <=1: Susce

ptible



                Staphylococcus aureus                                 

 

                Methicillin resistant Trimethoprim +                  <=10: Susc

eptible



                Staphylococcus aureus Sulfamethoxazole                 

 

                Methicillin resistant Vancomycin                      1: Suscept

ible



                Staphylococcus aureus                                 

 

                Escherichia coli Amikacin                        <=2: Susceptibl

e

 

                Escherichia coli Ampicillin + Sulbactam                 >=32: Re

sistant

 

                Escherichia coli Aztreonam                       Resistant

 

                Escherichia coli Cefepime                        Resistant

 

                Escherichia coli Cefoxitin                       >=64: Resistant

 

                Escherichia coli Ceftazidime                     Resistant

 

                Escherichia coli Ceftriaxone                     Resistant

 

                Escherichia coli Ertapenem                       <=0.5: Suscepti

ble

 

                Escherichia coli Gentamicin                      <=1: Susceptibl

e

 

                Escherichia coli Levofloxacin                    >=8: Resistant

 

                Escherichia coli Meropenem                       <=0.25: Suscept

ible

 

                Escherichia coli Piperacillin + Tazobactam                 Resis

tant

 

                Escherichia coli Tetracycline                    <=1: Susceptibl

e

 

                Escherichia coli Tobramycin                      <=1: Susceptibl

e

 

                Escherichia coli Trimethoprim +                  >=320: Resistan

t



                                Sulfamethoxazole                 









                Performing Organization Address         [facility] Phone

 Number

 

                [facility] [address] [place]

 [address] 

[phone]



                CENTER                                          



Drug screen, urine, comprehensive (2020 12:27 AM CST)





                                        Specimen

 

                                        Urine - Urine, Suprapubic Aspirate









                          Narrative                 Performed At

 

                                        This result has an attachment that is no

t available.



                                        



Urine culture (2020 12:27 AM CST)





                Component       Value           Ref Range       Performed At

 

                Result          80-89,000 col/mL Gram negative rods             

    [facility]



                                (A)Comment: Same as second isolate.             

    MEDICAL CENTER

 

                Result          80-89,000 col/mL Proteus mirabilis              

   [facility]



                                (A)Comment: ESBL Positive                 [facility]

 

                Result          >100,000 col/mL Beta-hemolytic                 C

[facility]



                                streptococcus group B, by                 [facility]



                                serological grouping (A)                 









                                        Specimen

 

                                        Urine - Urine, Suprapubic Aspirate









                Organism        Antibiotic      Method          Susceptibility

 

                Proteus mirabilis Amikacin                        4: Susceptible

 

                Proteus mirabilis Ampicillin + Sulbactam                 Resista

nt

 

                Proteus mirabilis Aztreonam                       32: Resistant

 

                Proteus mirabilis Cefepime                        32: Resistant

 

                Proteus mirabilis Cefoxitin                       Resistant

 

                Proteus mirabilis Ceftazidime                     Resistant

 

                Proteus mirabilis Ceftriaxone                     Resistant

 

                Proteus mirabilis Ertapenem                       <=0.5: Suscept

ible

 

                Proteus mirabilis Gentamicin                      8: Resistant

 

                Proteus mirabilis Levofloxacin                    >=8: Resistant

 

                Proteus mirabilis Meropenem                       0.5: Susceptib

le

 

                Proteus mirabilis Nitrofurantoin                  256: Resistant

 

                Proteus mirabilis Tetracycline                    8: Resistant

 

                Proteus mirabilis Tobramycin                      >=16: Resistan

t









                Performing Organization Address         City/[facility]/Zipcode Phone

 Number

 

                [facility] [address] [place]

 [address] [phone]



                CENTER                                          



XR foot 2 views right (2020  9:22 PM CST)





                                        Specimen

 

                                        









                          Narrative                 Performed At

 

                                        FINAL REPORT



                                        [facility]



                                         



                                        



                                        PATIENT ID: [id number]



                                        



                                         



                                        



                                        TECHNIQUE: Two views each of the bilater

al feet



                                        



                                         



                                        



                                        HISTORY: osteomyletitis.



                                        



                                         



                                        



                                        COMPARISON: None.



                                        



                                         



                                        



                                        IMPRESSION:



                                        



                                        No acute displaced fracture or dislocati

on. 



                                        



                                        Joint spaces are within normal limits.



                                        



                                        Severe soft tissue swelling.



                                        



                                        On the right subcentimeter nonspecific c

alcifications adjacent to the 



                                        



                                        heel plantar aspect. Correlate with phys

ical exam.



                                        



                                         



                                        



                                        Severely limited exam due to patient bod

y habitus. No definite 



                                        



                                        cortical irregularity.



                                        



                                        There is clinical concern for osteomyeli

tis, recommend MRI with 



                                        



                                        contrast.



                                        



                                         



                                        



                                        Signed: Sriram Townsend MD



                                        



                                        Report Verified Date/Time:2020

 22:28:25



                                        



                                         



                                        



                                        Reading Location: [address] Consult R

eading Room



                                        



                          Electronically signed by: SRIRAM TOWNSEND DO on 2020 



                                        10:28 PM



                                        



                                                    









                                        Procedure Note

 

                                        Interface, External Ris In - 2020 

10:30 PM CST



FINAL REPORT



                                        



                                        PATIENT ID:   [id number]



                                        



                                        TECHNIQUE: Two views each of the bilater

al feet



                                        



                                        HISTORY: osteomyletitis.



                                        



                                        COMPARISON: None.



                                        



                                        IMPRESSION:



                                        No acute displaced fracture or dislocati

on.



                                        Joint spaces are within normal limits.



                                        Severe soft tissue swelling.



                                        On the right subcentimeter nonspecific c

alcifications adjacent to the



                                        heel plantar aspect. Correlate with phys

ical exam.



                                        



                                        Severely limited exam due to patient bod

y habitus. No definite



                                        cortical irregularity.



                                        There is clinical concern for osteomyeli

tis, recommend MRI with



                                        contrast.



                                        



                                        Signed: Sriram Townsend MD



                                        Report Verified Date/Time:  2020 2

2:28:25



                                        



                                        Reading Location: [address] Consult R

eading Room



                                          Electronically signed by: SRIRAM TOWNSEND DO on 2020 10:28 PM



                                        









                Performing Organization Address         City/State/Zipcode Phone

 Number

 

                [facility]                                          



XR foot 2 views left (2020  9:22 PM CST)





                                        Specimen

 

                                        









                          Narrative                 Performed At

 

                                        FINAL REPORT



                                        [facility]



                                         



                                        



                                        PATIENT ID: [id number]



                                        



                                         



                                        



                                        TECHNIQUE: Two views each of the bilater

al feet



                                        



                                         



                                        



                                        HISTORY: osteomyletitis.



                                        



                                         



                                        



                                        COMPARISON: None.



                                        



                                         



                                        



                                        IMPRESSION:



                                        



                                        No acute displaced fracture or dislocati

on. 



                                        



                                        Joint spaces are within normal limits.



                                        



                                        Severe soft tissue swelling.



                                        



                                        On the right subcentimeter nonspecific c

alcifications adjacent to the 



                                        



                                        heel plantar aspect. Correlate with phys

ical exam.



                                        



                                         



                                        



                                        Severely limited exam due to patient bod

y habitus. No definite 



                                        



                                        cortical irregularity.



                                        



                                        There is clinical concern for osteomyeli

tis, recommend MRI with 



                                        



                                        contrast.



                                        



                                         



                                        



                                        Signed: Sriram Townsend MD



                                        



                                        Report Verified Date/Time:2020

 22:28:25



                                        



                                         



                                        



                                        Reading Location: [facility] [address] [facility]



                                        



                          Electronically signed by: SRIRAM TOWNSEND DO on 2020 



                                        10:28 PM



                                        



                                                    









                                        Procedure Note

 

                                        Interface, External Ris In - 2020 

10:30 PM CST



FINAL REPORT



                                        



                                        PATIENT ID:   [id number]



                                        



                                        TECHNIQUE: Two views each of the bilater

al feet



                                        



                                        HISTORY: osteomyletitis.



                                        



                                        COMPARISON: None.



                                        



                                        IMPRESSION:



                                        No acute displaced fracture or dislocati

on.



                                        Joint spaces are within normal limits.



                                        Severe soft tissue swelling.



                                        On the right subcentimeter nonspecific c

alcifications adjacent to the



                                        heel plantar aspect. Correlate with phys

ical exam.



                                        



                                        Severely limited exam due to patient bod

y habitus. No definite



                                        cortical irregularity.



                                        There is clinical concern for osteomyeli

tis, recommend MRI with



                                        contrast.



                                        



                                        Signed: Sriram Townsend MD



                                        Report Verified Date/Time:  2020 2

2:28:25



                                        



                                        Reading Location: [facility] [address] Consult [facility] Room



                                          Electronically signed by: SRIRAM TOWNSEND DO on 2020 10:28 PM



                                        









                Performing Organization Address         City/State/Zipcode Phone

 Number

 

                 [facility]                                          



after 2019



Insurance







           Payer      Benefit Plan / Subscriber ID Type       Phone      Address



                      Group                                       

 

           MEDICAID - MEDICAID [facility] STAR xxxxxxxxx  Medicaid Contracted  

          



           [facility] C[employer]   PLAN                                        









           Guarantor Name Account Type Relation to Date of    Phone      Billing

 Address



                                 Patient    Birth                 

 

           [place] Personal/Family Self       1985 [phone] [facility]

 [place]



                                                       (Home)     [address]







                                                                  [place]



                                                                  [address]







Advance Directives

For more information, please contact:[place] [address] [address], [place] [phone]





                Code Status     Date Activated  Date Inactivated Comments

 

                Full Code       2020  9:03 PM 2020  4:44 PM 









                    This code status was determined by: Patient             









                                                                

 

                Full Code       2017  1:36 AM 2017  8:43 PM 









                    This code status was determined by: Patient

## 2020-06-26 NOTE — EKG
Test Date:    2020-06-25               Test Time:    12:42:39

Technician:   KEELY                                     

                                                     

MEASUREMENT RESULTS:                                       

Intervals:                                           

Rate:         100                                    

VA:           146                                    

QRSD:         82                                     

QT:           320                                    

QTc:          412                                    

Axis:                                                

P:            63                                     

VA:           146                                    

QRS:          74                                     

T:            51                                     

                                                     

INTERPRETIVE STATEMENTS:                                       

                                                     

Normal sinus rhythm

Normal ECG

Compared to ECG 03/28/2019 20:52:29

Sinus tachycardia no longer present



Electronically Signed On 06-26-20 06:48:02 CDT by Cheng Anglin

## 2021-01-14 ENCOUNTER — HOSPITAL ENCOUNTER (EMERGENCY)
Dept: HOSPITAL 97 - ER | Age: 36
Discharge: HOME | End: 2021-01-14
Payer: COMMERCIAL

## 2021-01-14 VITALS — OXYGEN SATURATION: 97 % | TEMPERATURE: 98.6 F

## 2021-01-14 VITALS — DIASTOLIC BLOOD PRESSURE: 80 MMHG | SYSTOLIC BLOOD PRESSURE: 114 MMHG

## 2021-01-14 DIAGNOSIS — T83.038A: Primary | ICD-10-CM

## 2021-01-14 DIAGNOSIS — Z88.5: ICD-10-CM

## 2021-01-14 DIAGNOSIS — Q05.9: ICD-10-CM

## 2021-01-14 DIAGNOSIS — I10: ICD-10-CM

## 2021-01-14 DIAGNOSIS — Z88.0: ICD-10-CM

## 2021-01-14 DIAGNOSIS — F17.210: ICD-10-CM

## 2021-01-14 DIAGNOSIS — Z88.1: ICD-10-CM

## 2021-01-14 DIAGNOSIS — Z88.8: ICD-10-CM

## 2021-01-14 DIAGNOSIS — G80.9: ICD-10-CM

## 2021-01-14 DIAGNOSIS — Z88.4: ICD-10-CM

## 2021-01-14 PROCEDURE — 99283 EMERGENCY DEPT VISIT LOW MDM: CPT

## 2021-01-14 NOTE — XMS REPORT
Continuity of Care Document

                           Created on:2021



Patient:JORDAN VERDIN JR

Sex:Male

:1985

External Reference #:[id number]





Demographics







                          Address                   [address]



                                                    [place] [address]

 

                          Home Phone                [phone] FAC

 

                          Work Phone                [phone]

 

                          Mobile Phone              [phone]

 

                          Email Address             DECLINED

 

                          Preferred Language        English

 

                          Marital Status            Unknown

 

                          Orthodox Affiliation     Unknown

 

                          Race                      Unknown

 

                          Additional Race(s)        Unavailable



                                                    Black or 



                                                    Unavailable

 

                          Ethnic Group              Unknown









Author







                          Organization              [facility]

t

 

                          Address                   [address]



                                                    [place] [address]

 

                          Phone                     [phone]









Support







                Name            Relationship    Address         Phone

 

                Chito          Mother          [address] [phone]

[phone]



                                                [place] [address] 









Care Team Providers







                    Name                Role                Phone

 

                    Sharpless           Primary Care Physician [phone]

 

                    Ashia MEADOWS          Attending Clinician [phone]

 

                    Darby MEADOWS        Attending Clinician [phone]

 

                    Rafi MEADOWS           Attending Clinician [phone]

 

                    ASHIA             Attending Clinician Unavailable

 

                    GAYLE Linares       Attending Clinician [phone]

 

                    Jamie Reinoso         Attending Clinician [phone]

 

                    RALPH TORRES         Attending Clinician Unavailable

 

                    RAFI              Admitting Clinician Unavailable

 

                    Saw Ríos      Admitting Clinician [phone]

 

                    Jamie Reinoso         Admitting Clinician [phone]

 

                    RALPH TORRES         Admitting Clinician Unavailable









Payers







           Payer Name Policy Type Policy     Effective Date Expiration Date Sour

ce



                                 Number                           

 

           MEDICAID -            [id number]  2020            [facility]



           MEDICAID [id number]                       00:00:00              - Medical



           [facility]



           STAR                                                   



           [id number]                                             



           aid Contracted                                             







Problems







       Condition Condition Condition Status Onset  Resolution Last   Treating Co

mments 

Source



       Name   Details Category        Date   Date   Treatment Clinician        



                                                 Date                 

 

       Hyperglyce Hyperglyce Disease Active                              C

HI 



       jarvis    jarvis                                                 [facility]



       without without               00:00:                             Medical



       ketosis ketosis                                                Center

 

       HEADACHE        Diagnosis Active 2018               M

[facility]



                                             22:05:00               l



                HEADACHE               00:00:                             [facility]

n



                                   00                                 



                                                                      



               Active                                                  



                                                                      



                                                                      



              2018                                                  



                                                                      



                                                                      



                                                                      



                                                                      



                                                                      



                                                                      



                                                                      



                                                                      



                     [facility]                                                  



                                                                      



                                                                      

 

       SHUNT         Diagnosis Active 2018               Mem

oria



       MALFUNCTIO                                20:00:00               l



       N        SHUNT               00:00:                             [place]



              MALFUNCTIO               00                                 



              N                                                       



                                                                      



                 Active                                                  



                                                                      



                                                                      



              2018                                                  



                                                                      



                                                                      



                                                                      



                                                                      



                                                                      



                                                                      



                                                                      



                                                                      



                     [facility]                                                  



                                                                      



                                                                      

 

       ACUTE         Diagnosis Active 2018               Mem

oria



       HEADACHE                                09:20:00               l



                ACUTE               00:00:                             [place]



              HEADACHE               00                                 



                                                                      



                                                                      



              Active                                                  



                                                                      



                                                                      



              2018                                                  



                                                                      



                                                                      



                                                                      



                                                                      



                                                                      



                                                                      



                                                                      



                                                                      



                     [facility]                                                  



                                                                      



                                                                      

 

       Spina  Spina  Disease Active                              [facility]



       bifida bifida                                              [facility]



                                   00:00:                             Medical



                                   00                                 [place]

 

       Pyelonephr Pyelonephr Disease Active                              C

[facility]



       itis   itis                                                [facility]



                                   00:00:                             Medical



                                   00                                 [place]

 

       Nicotine Nicotine Problem Active                                    [facility]

t



       dependence dependence                                                  [facility]

[facility]



                                                                      [facility]



                                                                      [facility]



                                                                      [facility]



                                                                      [facility]

 

       Lumbar Lumbar Problem Active                                    [facility]



       spina  spina                                                   [facility]



       bifida bifida                                                  [facility]



       with   with                                                    l



       hydrocepha hydrocepha                                                  Ou

tpati



       ozzy    ozzy                                                     ent



                                                                      Clinics

 

       Dependence Dependence Problem Active                                    C

[facility]



       on     on                                                      [facility]



       wheelchair wheelchair                                                  Me

moria



                                                                      l



                                                                      [facility]



                                                                      [facility]

 

       Fecal  Fecal  Problem Active                                    [facility]



       incontinen incontinen                                                  [facility]

[facility]



       ce     ce                                                      [facility]



                                                                      [facility]



                                                                      [facility]



                                                                      [facility]

 

       Foot ulcer Foot ulcer Problem Active                                    [facility]

[facility]



                                                                      [facility]



                                                                      [facility]



                                                                      [facility]

 

       Depression Depression Problem Active                                    C

[facility]



       with   with                                                    [facility]



       anxiety anxiety                                                  [facility]



                                                                      [facility]



                                                                      [facility]

 

       Paraplegia Paraplegia Problem Active                                    [facility]

[facility]



                                                                      [facility]



                                                                      [facility]



                                                                      [facility]

 

       Constipati Constipati Problem Active                                    C

[facility]



       on     on                                                      [facility]



                                                                      [facility]



                                                                      [facility]

 

       Incontinen Incontinen Problem Active                                    C

[facility]



       ce of  ce of                                                   [facility]



       urine  urine                                                   [facility]



                                                                      [facility]

 

       Nausea Nausea Problem Active                                    [facility]



       alone  alone                                                   [facility]



                                                                      [facility]



                                                                      [facility]

 

       Episodic Episodic Problem Active                                    [facility]

t



       tension-ty tension-ty                                                  [facility]

[facility] -



       pe     pe                                                      [facility]



       headache, headache,                                                  l



       not    not                                                     Outpati



       intractabl intractabl                                                  en

t



       e      e                                                       Clinics

 

                 Problem Active                                    [facility]



       (ventricul (ventricul                                                  [facility]

[facility] -



       operitonea operitonea                                                  Me

moria



       l) shunt l) shunt                                                  l



       status status                                                  [facility]



                                                                      [facility]

 

       Nonintract Nonintract Problem Active                                    C

[facility]



       able   able                                                    [facility]



       episodic episodic                                                  [facility]

a



       headache, headache,                                                  l



       unspecifie unspecifie                                                  Ou

tpati



       d headache d headache                                                  en

t



       type   type                                                    Clinics

 

       Mental Mental Problem Active                                    [facility]



       disorder, disorder,                                                  [facility]

s -



       not    not                                                     [facility]



       otherwise otherwise                                                  l



       specified specified                                                  Outp

ati



                                                                      ent



                                                                      Clinics

 

       Insomnia Insomnia Problem Active                                    [facility]

t



       due to due to                                                  [facility]



       other  other                                                   [facility]



       mental mental                                                  l



       disorder disorder                                                  Outpat

i



                                                                      ent



                                                                      Clinics

 

       Ulcer of Ulcer of Problem Active                                    [facility]

t



       left foot, left foot,                                                  [facility]

[facility] -



       unspecifie unspecifie                                                  Me

moria



       d ulcer d ulcer                                                  l



       stage  stage                                                   Outpati



                                                                      ent



                                                                      Clinics

 

       Bipolar Bipolar Problem Active                                    [facility]



       depression depression                                                  [facility]

[facility] -



                                                                      [facility]



                                                                      l



                                                                      Outpat[facility]



                                                                      ent



                                                                      Clinics

 

       Nausea and Nausea and Problem Active                                    C

[facility]



       vomiting, vomiting,                                                  [facility]

s -



       intractabi intractabi                                                  Me

moria



       lity of lity of                                                  l



       vomiting vomiting                                                  Outpat

i



       not    not                                                     ent



       specified, specified,                                                  Cl

inics



       unspecifie unspecifie                                                  



       d vomiting d vomiting                                                  



       type   type                                                    

 

       Blood  Blood  Problem Active                                    [facility]



       tests for tests for                                                  [facility]

s -



       routine routine                                                  [facility]



       general general                                                  l



       physical physical                                                  Outpat

i



       examinatio examinatio                                                  en

t



       n      n                                                       Clinics

 

       Spina         Problem                      2019               Memor

ia



       bifida,                                    14:14:02               l



       unspecifie   Spina                                                  [facility]

nn



       d      bifida,                                                  



              unspecifie                                                  



              d                                                       



                                                                      



                                                                      



                                                                      



                                                                      



                                                                      



                                                                      



                                                                      



                                                                      



              2019                                                  



                                                                      



                                                                      



                                                                      



                                                                      



                 [facility]                                                  



                                                                      



                                                                      

 

       Nausea        Problem                      2019               Memor

ia



       with                                      14:14:02               l



       vomiting,   Nausea                                                  [place]

nn



       unspecifie with                                                    



       d      vomiting,                                                  



              unspecifie                                                  



              d                                                       



                                                                      



                                                                      



                                                                      



                                                                      



                                                                      



                                                                      



                                                                      



                                                                      



              2019                                                  



                                                                      



                                                                      



                                                                      



                                                                      



                 [facility]                                                  



                                                                      



                                                                      

 

       Diplopia        Problem                      2019               Mem

oria



                                                 14:14:02               l



                Diplopia                                                  [facility]

n



                                                                      



                                                                      



                                                                      



                                                                      



                                                                      



                                                                      



                                                                      



                                                                      



                                                                      



              2019                                                  



                                                                      



                                                                      



                                                                      



                                                                      



                 [facility]                                                  



                                                                      



                                                                      

 

       Cerebral        Problem                      2019               [facility]

[facility]



       palsy,                                    14:14:02               l



       unspecifie   Cerebral                                                  He

rmann



       d      palsy,                                                  



              unspecifie                                                  



              d                                                       



                                                                      



                                                                      



                                                                      



                                                                      



                                                                      



                                                                      



                                                                      



                                                                      



              2019                                                  



                                                                      



                                                                      



                                                                      



                                                                      



                 [facility]                                                  



                                                                      



                                                                      

 

       Acquired        Problem                      2019               Mem

[facility]



       absence of                                    14:14:02               l



       other    Acquired                                                  [place]ig[place]

n



       specified absence of                                                  



       parts of other                                                   



       digestive specified                                                  



       tract  parts of                                                  



              digestive                                                  



              tract                                                   



                                                                      



                                                                      



                                                                      



                                                                      



                                                                      



                                                                      



                                                                      



                                                                      



                                                                      



              2019                                                  



                                                                      



                                                                      



                                                                      



                                                                      



                 [facility]                                                  



                                                                      



                                                                      

 

       Nicotine        Problem                      2019               [facility]

[facility]



       dependence                                    14:14:02               l



       ,        Nicotine                                                  Miguel malloy



       cigarettes dependence                                                  



       ,      ,                                                       



       uncomplica cigarettes                                                  



       manjeet    ,                                                       



              uncomplica                                                  



              manjeet                                                     



                                                                      



                                                                      



                                                                      



                                                                      



                                                                      



                                                                      



                                                                      



                                                                      



              2019                                                  



                                                                      



                                                                      



                                                                      



                                                                      



                 [facility]                                                  



                                                                      



                                                                      

 

       Presence        Problem                      2019               Mem

oria



       of                                        14:14:02               l



       cerebrospi   Presence                                                  He

rmann



       nal fluid of                                                      



       drainage cerebrospi                                                  



       device nal fluid                                                  



              drainage                                                  



              device                                                  



                                                                      



                                                                      



                                                                      



                                                                      



                                                                      



                                                                      



                                                                      



                                                                      



                                                                      



              2019                                                  



                                                                      



                                                                      



                                                                      



                                                                      



                 [facility]                                                  



                                                                      



                                                                      

 

       Allergy        Problem                      2019               [facility]

rajeev



       status to                                    14:14:02               l



       other    [facility]ergy                                                  [place]



       antibiotic status to                                                  



       agents other                                                   



       status antibiotic                                                  



              agents                                                  



              status                                                  



                                                                      



                                                                      



                                                                      



                                                                      



                                                                      



                                                                      



                                                                      



                                                                      



                                                                      



              2019                                                  



                                                                      



                                                                      



                                                                      



                                                                      



                 [facility]                                                  



                                                                      



                                                                      

 

       Allergy        Problem                      2019               [facility]

rajeev



       status to                                    14:14:02               l



       other    [facility]ergy                                                  [place]



       drugs, status to                                                  



       medicament other                                                   



       s and  drugs,                                                  



       biological medicament                                                  



       substances s and                                                   



       status biological                                                  



              substances                                                  



              status                                                  



                                                                      



                                                                      



                                                                      



                                                                      



                                                                      



                                                                      



                                                                      



                                                                      



                                                                      



              2019                                                  



                                                                      



                                                                      



                                                                      



                                                                      



                 [facility]                                                  



                                                                      



                                                                      

 

       Allergy        Problem                      2019               [facility]

rajeev



       status to                                    14:14:02               l



       narcotic   Allergy                                                  [place]

nn



       agent  status to                                                  



       status narcotic                                                  



              agent                                                   



              status                                                  



                                                                      



                                                                      



                                                                      



                                                                      



                                                                      



                                                                      



                                                                      



                                                                      



                                                                      



              2019                                                  



                                                                      



                                                                      



                                                                      



                                                                      



                 [facility]                                                  



                                                                      



                                                                      

 

       Asthma        Problem Resolve               2019               [facility]

rajeev



       (disorder)               d                    01:05:55               l



                Asthma                                                  [place]



              (disorder)                                                  



                                                                      



                                                                      



               Resolved                                                  



                                                                      



                                                                      



                                                                      



                                                                      



                Problem                                                  



                                                                      



                                                                      



              2019                                                  



                                                                      



                                                                      



                                                                      



                                                                      



                 [facility]                                                  



                                                                      



                                                                      

 

       Bronchitis        Problem Resolve               2019               

[facility]



       (disorder)               d                    01:05:55               l



                                                                      [place]



              Bronchitis                                                  



              (disorder)                                                  



                                                                      



                                                                      



               Resolved                                                  



                                                                      



                                                                      



                                                                      



                                                                      



                Problem                                                  



                                                                      



                                                                      



              2019                                                  



                                                                      



                                                                      



                                                                      



                                                                      



                 [facility]                                                  



                                                                      



                                                                      

 

       Cerebral        Problem Resolve               2019               Me

moria



       palsy                d                    01:05:55               l



       (disorder)   Cerebral                                                  He

rmann



              palsy                                                   



              (disorder)                                                  



                                                                      



                                                                      



               Resolved                                                  



                                                                      



                                                                      



                                                                      



                                                                      



                Problem                                                  



                                                                      



                                                                      



              2019                                                  



                                                                      



                                                                      



                                                                      



                                                                      



                 [facility]                                                  



                                                                      



                                                                      

 

       Hydrocepha        Problem Resolve               2019               

[facility]



       ozzy                  d                    01:05:55               l



       (disorder)                                                         [place]

n



              Hydrocepha                                                  



              ozzy                                                     



              (disorder)                                                  



                                                                      



                                                                      



               Resolved                                                  



                                                                      



                                                                      



                                                                      



                                                                      



                Problem                                                  



                                                                      



                                                                      



              2019                                                  



                                                                      



                                                                      



                                                                      



                                                                      



                 [facility]                                                  



                                                                      



                                                                      

 

       Osteomyeli        Problem Resolve               2019               

[facility]



       tis                  d                    01:05:55               l



       (disorder)                                                         [place]

n



              Osteomyeli                                                  



              tis                                                     



              (disorder)                                                  



                                                                      



                                                                      



               Resolved                                                  



                                                                      



                                                                      



                                                                      



                                                                      



                Problem                                                  



                                                                      



                                                                      



              2019                                                  



                                                                      



                                                                      



                                                                      



                                                                      



                 [facility]                                                  



                                                                      



                                                                      

 

       Acute pain        Problem Active               2019               M

[facility]



       (finding)                                    01:05:55               l



                Acute                                                  Jose



              pain                                                    



              (finding)                                                  



                                                                      



                                                                      



              Active                                                  



                                                                      



                                                                      



                                                                      



                                                                      



              Problem                                                  



                                                                      



                                                                      



              2019                                                  



                                                                      



                                                                      



                                                                      



                                                                      



                 [facility]                                                  



                                                                      



                                                                      

 

       Headache        Problem Active               2019               [facility]

oria



       (finding)                                    01:05:55               l



                Headache                                                  [facility]

n



              (finding)                                                  



                                                                      



                                                                      



              Active                                                  



                                                                      



                                                                      



                                                                      



                                                                      



              Problem                                                  



                                                                      



                                                                      



              2019                                                  



                                                                      



                                                                      



                                                                      



                                                                      



                 [facility]                                                  



                                                                      



                                                                      

 

       Headache        Problem        -2019              

 [facility]



                                      14:14:02 14:14:02               l



                Headache               06:00:                             [facility]

n



                                   00                                 



                                                                      



                                                                      



                                                                      



                                                                      



              2018                                                  



                                                                      



                                                                      



                                                                      



                                                                      



                 [facility]                                                  



                                                                      



                                                                      







Allergies, Adverse Reactions, Alerts







       Allergy Allergy Status Severity Reaction(s) Onset  Inactive Treating Comm

ents 

Source



       Name   Type                        Date   Date   Clinician        

 

       Sulfamet Propensi Active        Hives                        [facility]



       hoxazole ty to                                               [facility]



       -[facility] adverse                      00:00:                      Medical



       oprim  reaction                      00                          Center



              s                                                       

 

       Levoflox Propensi Active        Hives                        [facility]



       acin   ty to                                               [facility]



              adverse                      00:00:                      Medical



              reaction                      00                          Center



              s                                                       

 

       Morphine Propensi Active        Hives                        [facility]



              ty to                                               [facility]



              adverse                      00:00:                      Medical



              reaction                      00                          Center



              s                                                       

 

       Sesame Propensi Active        Hives                        [facility]



       Seed   ty to                                               [facility]



              adverse                      00:00:                      Medical



              reaction                      00                          Center



              s                                                       

 

       Ketorola Propensi Active        Rash                         [facility]



       c      ty to                                               [facility]



              adverse                      00:00:                      Medical



              reaction                      00                          Center



              s                                                       

 

       Vancomyc Propensi Active        Rash                         [facility]



       in     ty to                                               [facility]



       Analogue adverse                      00:00:                      Medical



       s      reaction                      00                          Center



              s                                                       

 

       Ondanset Propensi Active        Nausea And                       [facility]



       jersey Hcl ty to                Vomiting                         [facility]



       (Pf)   adverse                      00:00:                      Medical



              reaction                      00                          Center



              s                                                       

 

       Morphine Adverse Active        Info Not                             [facility]

t



       Sulfate Reaction               Available                             [facility]

s -



       ER                                                             [facility]



                                                                      l



                                                                      Outpati



                                                                      ent



                                                                      Clinics

 

       Levaquin Adverse Active        Info Not                             [facility]

t



              Reaction               Available                             [facility]

 -



                                                                      [facility]



                                                                      l



                                                                      Outpati



                                                                      ent



                                                                      Clinics

 

       Zofran Adverse Active        Info Not                             [facility]



              Reaction               Available                             [facility]

 -



                                                                      [facility]



                                                                      l



                                                                      Outpati



                                                                      ent



                                                                      Clinics

 

       amoxicil amoxicil Active                                           [facility]

a



       bryn    bryn                                                     l



                                                                      [facility]

 

       morphine morphine Active                                           [facility]

a



                                                                      l



                                                                      [facility]

 

       Toradol Toradol Active                                           [facility]



                                                                      l



                                                                      [facility]

 

       Minocin Minocin Active                                           [facility]



                                                                      l



                                                                      [facility]

 

       Zofran Zofran Active                                           [facility]



                                                                      l



                                                                      [facility]

 

       Levaquin Levaquin Active                                           [facility]

a



                                                                      l



                                                                      [facility]

 

       Bactrim Bactrim Active                                           [facility]



                                                                      l



                                                                      [facility]







Social History







           Social Habit Start Date Stop Date  Quantity   Comments   Source

 

           History of tobacco                       Cigarette Smoker            

[facility]

 

           Sex Assigned At                                             [facility]



           Birth                                                  [facility]

 

           Cigarettes smoked 2017                       [facility] -



           current (pack per 00:00:00   00:00:00                         Medical

 Center



           day) - Reported                                             









                Smoking Status  Start Date      Stop Date       Source

 

                Social History  2018 11:26:10                 [facility]

[facility]







Medications







       Ordered Filled Start  Stop   Current Ordering Indication Dosage Frequency

 Signature

                    Comments            Components          Source



     Medication Medication Date Date Medication? Clinician                (SIG) 

          



     Name Name                                                   

 

     buPROPion      2021- No             150mg QD   Take 1           [facility]



     (WELLBUTRIN      1-17 01-16                          tablet           [facility]

 -



     XL) 150 MG      00:00: 23:59                          (150 mg           Med

ical



     24 hr      00   :00                           total) by           Center



     tablet                                         mouth           



                                                  daily.           

 

     citalopram      2021- No             40mg QD   Take 1           [facility]



     (CELEXA) 40      1-17 -16                          tablet (40           L

ukes -



     MG tablet      00:00: 23:59                          mg total)           Me

dical



               00   :00                           by mouth           Center



                                                  daily.           

 

     oxyCODONE-a            Yes            1{tbl}      Take 1           [facility]



     cetaminophe      1-16                               tablet by           Ni

es -



     n         14:44:                               mouth           Medical



     (PERCOCET)      50                                 every 4           Center



      mg                                         (four)           



     per tablet                                         hours as           



                                                  needed for           



                                                  Pain.           

 

     zinc            Yes            5g   Q.5D Apply 5 g           [facility]



     oxide-yuan      1-16                               topically           Ni

es -



     latum      00:00:                               2 (two)           Medical



     (CRITIC-AID      00                                 times           Center



     ) 20-51 %                                         daily.           



     Pste                                                        



     topical                                                        



     paste                                                        

 

     meropenem            Yes            1g        Inject 1 g           [facility]



     (MERREM)      1-16                               intravenou           [facility]

 -



     MBP 1 gm in      00:00:                               sly every           M

edical



     100 mL NS      00                                 8 (eight)           [facility]

r



                                                  hours.           

 

     insulin            Yes            58U  Q.5D Inject 58           [facility]

t



     glargine      1-16                               Units           [facility] -



     (LANTUS)      00:00:                               subcutaneo           Med

ical



     100 unit/mL      00                                 usly 2           Center



     injection                                         (two)           



                                                  times           



                                                  daily Use           



                                                  as             



                                                  directed.           

 

     insulin      2021- No             0U        Inject           [facility]



     lispro      1-16 01-15                          0-12 Units           [facility] 

-



     (HUMALOG)      00:00: 23:59                          subcutaneo           M

edical



     100 unit/mL      00   :00                           usly 3           Center



     injection                                         (three)           



                                                  times           



                                                  daily           



                                                  before           



                                                  meals.           

 

     insulin      2021- No             25U       Inject 25           [facility]



     lispro      1-16 01-15                          Units           [facility]



     (HUMALOG)      00:00: 23:59                          subcutaneo           M

edical



     100 unit/mL      00   :00                           usly 3           Center



     injection                                         (three)           



                                                  times           



                                                  daily           



                                                  before           



                                                  meals.           

 

     traZODone      0 2020- No             100mg QD   Take 1           [facility]



     (DESYREL)      16 -15                          tablet           [facility]



     100 MG      00:00: 23:59                          (100 mg           Medical



     tablet      00   :00                           total) by           Center



                                                  mouth           



                                                  nightly           



                                                  for 30           



                                                  days.           

 

     normal      2018      No                       1,000 mL,           [facility]

a



     saline 0.9%                                     Rate: 100           l



     IV 1,000 mL      11:04:                               ml/hr,           [facility]

nguyen



                                                Infuse           



                                                  over: 10           



                                                  hr, Route:           



                                                  IV, Dosing           



                                                  Weight           



                                                  131.818           



                                                  kg, Total           



                                                  Volume:           



                                                  1,000,           



                                                  Start           



                                                  date:           



                                                  18           



                                                  5:04:00           



                                                  CST,           



                                                  Duration:           



                                                  30 day,           



                                                  Stop date:           



                                                  18           



                                                  5:03:00           



                                                  CST, 2.4,           



                                                  m2             

 

     Magnesium      2018      No                       Notes:           [facility]

a



     Sulfate                                     WASTE: F/P           l



               10:36:                               - Sink; E           Jose



                                                -              



                                                  [facility]           



                                                  Trash Bin           

 

     Isolyte S      2018      No                       Notes:           [facility]

a



     PH-7.4                                     (Same as:           l



     (Bolus) IV      10:36:                               Isolyte S           

rmann



                                                PH 7.4)           

 

     Phenergan      2018      No                       12.5 mg,           [facility]

rajeev



               08                               0.5 mL,           l



               10:35:                               Route:           [facility]



               00                                 IVPB, Drug           



                                                  form: INJ,           



                                                  ONCE,           



                                                  Dosing           



                                                  Weight           



                                                  131.818,           



                                                  kg,            



                                                  Priority:           



                                                  STAT,           



                                                  Start           



                                                  date:           



                                                  18           



                                                  4:35:00           



                                                  CST, Stop           



                                                  date:           



                                                  18           



                                                  4:35:00           



                                                  CST            

 

     Citalopram Citalopram       Yes  [place]                1 tablet     

      [facility]



     Hydrobromid Hydrobromid 7-16                                              L

[facility] -



     e    e    00:00:                                              [facility]



               [facility]



                                                                 [facility]

 

     Seroquel Seroquel       Yes  [place]                1 tablet         

  [facility]



               7-16                                              [facility]



               00:00:                                              [facility]



               [facility]                                                [facility]



                                                                 [facility]

 

     Docusate            Yes                      100 mg = 1           Mem

oria



     Sodium 100      5-08                               cap, PO,           l



     MG Oral      14:56:                               BID, 0           [facility]



     Capsule      00                                 Refill(s)           

 

     Zosyn            Yes                      0              [facility]



               5-08                               Refill(s)           l



               14:56:                                              Jose



               00                                                

 

     celecoxib            Yes                      200 mg = 1           Me

moria



     200 mg oral      5-08                               cap, PO,           l



     capsule      14:56:                               BID, 0           [facility]



               00                                 Refill(s)           

 

     ascorbic            Yes                      500 mg = 1           Mem

oria



     acid      5-08                               tab, PO,           l



               14:56:                               BID, 0           [facility]



               00                                 Refill(s)           

 

     acetaminoph            Yes                      1,000 mg =           

[facility]



     en 500 mg      -08                               2 tab, PO,           l



     oral tablet      14:56:                               Q6Hnow, 0           H

ermann



               00                                 Refill(s)           

 

     Oxycodone            Yes                      5 mg = 1           [facility]

rajeev



     Hydrochlori      5-08                               tab, PO,           l



     de 5 MG      14:56:                               Q4H, PRN           [facility]

n



     Oral Tablet      00                                 Pain Score           



                                                  4-6, 0           



                                                  Refill(s)           

 

     zinc            Yes                      220 mg = 1           [facility]



     sulfate 220      5-08                               cap, PO,           l



     mg oral      14:56:                               Daily, 0           [facility]

n



     capsule      00                                 Refill(s)           

 

     multivitami            Yes                      1 tab, PO,           

[facility]



     n         5-08                               Daily, 0           l



               14:56:                               Refill(s)           Jose



                                                               

 

     methocarbam            Yes                      1,000 mg =           

[facility]



     ol 500 mg      -08                               2 tab, PO,           l



     oral tablet      14:56:                               Q8H, 0           [facility]

nguyen



                                                Refill(s)           

 

     LORazepam            Yes                      0.5 mg = 1           [facility]

moria



     0.5 mg oral      5-08                               tab, PO,           l



     tablet      14:56:                               Q8H, PRN           [facility]



               00                                 Anxiety, 0           



                                                  Refill(s)           

 

     Lidocaine            Yes                      3 patch,           [facility]

rajeev



     Hydrochlori      5-08                               TOP,           l



     de 0.05      14:56:                               Daily,           [facility]



     MG/MG      00                                 Remove           



     Transdermal                                         after 12           



     Patch                                         hours, 0           



     [Lidoderm]                                         Refill(s)           

 

     Robaxin            No                       Notes:           [facility]



               -06                               (Same           l



               21:00:                               as:Robaxin           [facility]



               00                                 )              

 

     Oxycodone            No                       Notes:           [facility]

a



     Hydrochlori                                     (Same as:           l



     de 5 MG      18:06:                               Roxicodone           [facility]

nguy[facility]



     Oral Tablet      00                                 )              

 

     Ativan            No                       Notes:           [facility]



               5-06                               (Same as:           l



               15:18:                               Ativan)           [facility]                                                

 

     Trazodone            No                       Notes:           [facility]

a



     Hydrochlori      5-06                               (Same As:           l



     de 50 MG      02:00:                               Desyrel)           [facility]

nn



     Oral Tablet      00                                                

 

     remove            No                       Notes:           [facility]



     patch      5-06                               Remove           l



               02:00:                               patch 12           [facility]



               00                                 hours           



                                                  after           



                                                  applicatio           



                                                  n each           



                                                  day.           

 

     Oxycodone            No                       Notes:           [facility]

a



     Hydrochlori      5-05                               (Same as:           l



     de 5 MG      22:01:                               Roxicodone           [facility]

nguyen



     Oral Tablet      00                                 )              

 

     Celebrex            No                       Notes:           [facility]



               5-05                               NSAID.           l



               22:00:                               Please           [facility]                                 check           



                                                  indication           



                                                  . Not for           



                                                  seizure.           



                                                  (Same As:           



                                                  CeleBREX)           

 

     Vancomycin            No                        2001 mg:           [facility]

moria



               5-05                               infuse           l



               21:00:                               over 2.5           [facility]



               00                                 hours           

 

     Lidocaine            No                       Notes:           [facility]

a



     Hydrochlori      5-05                               Apply only           l



     de 0.05      14:00:                               once for           [facility]

n



     MG/MG      00                                 up to 12           



     Transdermal                                         hours in a           



     Patch                                         24-hour           



     [Lidoderm]                                         period (12           



                                                  hours on           



                                                  and 12           



                                                  hours           



                                                  off).           



                                                  (Same as:           



                                                  Lidoderm)           



                                                  "Remove           



                                                  old patch           



                                                  before           



                                                  applicatio           



                                                  n of new           



                                                  patch"           

 

     Phenergan            No                       Notes: Do           [facility]

[facility]



               5-04                               not give           l



               22:40:                               IV push.                                            (Same as:           



                                                  Phenergan)           

 

     Dilaudid            No                       Notes:           [facility]



               5-04                               Same as           l



               22:40:                               Dilaudid                                                           

 

     Tramadol            No                       Notes: Not           [facility]

[facility]



               5-04                               to exceed           l



               22:00:                               400mg/day.           [facility]



                                                (Same As:           



                                                  Ultram)           

 

     gabapentin            No                       Notes:           [facility]

ia



               5-04                               (Same as:           l



               22:00:                               Neurontin)                                                           

 

     Acetaminoph            No                       Notes: Max           

[facility]



     en        5-04                               acetaminop           l



               22:00:                               hen 4000           [facility]                                 mg/day (4           



                                                  gm/day).           



                                                  (Same as:           



                                                  Tylenol           



                                                  Extra           



                                                  Strength)           

 

     Robaxin            No                       Notes:           [facility]



               5-04                               (Same           l



               22:00:                               as:Robaxin                                            )              

 

     Oxycodone            No                       Notes:           [facility]

a



     Hydrochlori      5-04                               (Same as:           l



     de 5 MG      21:33:                               Roxicodone           [facility]

[facility]



     Oral Tablet      00                                 )              

 

     Beneprotein            No                       Notes:           [facility]

raje[facility]



     7 gm pkt      5-04                               (Same as:           l



               21:30:                               Beneprotei           [facility]



               00                                 n)             

 

     Acetaminoph            No                       1 tab, PO,           

[facility]



     en 325 MG /      5-04                               TID, 0           l



     Oxycodone      17:12:                               Refill(s)           [facility]

mateusz



     Hydrochlori      00                                                



     de 10 MG                                                        



     Oral Tablet                                                        



     [Percocet                                                        



     10/325]                                                        

 

     Dilaudid            No                       0.5 mg,           [facility]

a



               5-04                               0.25 mL,           l



               16:01:                               Route:                                            IVP, Drug           



                                                  form: INJ,           



                                                  ONCE,           



                                                  Start           



                                                  date:           



                                                  18           



                                                  11:01:00           



                                                  CDT, Stop           



                                                  date:           



                                                  18           



                                                  11:01:00           



                                                  CDT            

 

     Phenergan            No                       Notes:           [facility]

a



               5-04                               (Same as:           l



               15:43:                               Phenergan)                                                           

 

     Dilaudid            No                       2 mg,           [facility]



               5-04                               Route:           l



               15:43:                               IVP, ONCE,                                            Dosing           



                                                  Weight           



                                                  127.027,           



                                                  kg,            



                                                  Priority:           



                                                  STAT,           



                                                  Start           



                                                  date:           



                                                  18           



                                                  10:43:00           



                                                  CDT, Stop           



                                                  date:           



                                                  18           



                                                  10:43:00           



                                                  CDT            

 

     Docusate            No                       Notes:           [facility]



               5-04                               (Same as:           l



               14:00:                               Colace)                                            (Do Not           



                                                  Crush)           

 

     Zinc            No                       Notes:           [facility]



     Sulfate      -04                               (Zinc           l



               14:00:                               sulfate                                            capsule) -           



                                                  220 mg           



                                                  Zinc           



                                                  sulfate =           



                                                  50 mg           



                                                  elemental           



                                                  zinc  Same           



                                                  as Zinc           



                                                  Sulfate           

 

     ascorbic            No                       Notes:           [facility]



     acid      5-04                               (Same as:           l



               14:00:                               Vitamin C)                                                           

 

     multivitami            No                       Notes:           [facility]

[facility]



     n         5-04                               (Same           l



               14:00:                               as:Thera)                                            WASTE: F/P           



                                                  - Black; E           



                                                  -              



                                                  Municipal           



                                                  Trash Bin           



                                                  Take with           



                                                  food.           

 

     Naproxen            No                       Notes:           [facility]



               5-04                               (Same as:           l



               07:00:                               Naprosyn)                                            Take with           



                                                  food.           

 

     Zosyn            No                       Notes:           [facility]



               5-04                               (Same as:           l



               07:00:                               Zosyn)                                            Dosing           



                                                  based on           



                                                  Piperacill           



                                                  in             



                                                  component           



                                                   ***           



                                                  MEDICATION           



                                                  WASTE ***           



                                                  Product           



                                                  Size:           



                                                  3375 mg           



                                                  Product           



                                                  Wasted:           



                                                  ___ mg           

 

     Vancomycin            No                         mg:           [facility]

[facility]



               5-                               infuse           l



               07:00:                               over 2.5           [facility]



               00                                 hours  For           



                                                  adult           



                                                  patients           



                                                  only:           



                                                  Round to           



                                                  nearest           



                                                  250 mg per           



                                                  Medical           



                                                  Staff           



                                                  approval           



                                                  ***            



                                                  MEDICATION           



                                                  WASTE ***           



                                                  Product           



                                                  Size:           



                                                  1000 mg           



                                                  Product           



                                                  Wasted:           



                                                  ___ mg           

 

     Enoxaparin            No                       Notes:           [facility]

ia



               -                               (Same as:           l



               07:00:                               Lovenox)                                                           

 

     Sodium            No                       1,000 mL,           [facility]

a



     Chloride                                     Rate: 125           l



     0.9% IV      06:46:                               ml/hr,           [facility]



     1,000 mL      00                                 Infuse           



                                                  over: 8           



                                                  hr, Route:           



                                                  IV, Dosing           



                                                  Weight           



                                                  127.27 kg,           



                                                  Total           



                                                  Volume:           



                                                  1,000,           



                                                  Start           



                                                  date:           



                                                  18           



                                                  1:46:00           



                                                  CDT,           



                                                  Duration:           



                                                  30 day,           



                                                  Stop date:           



                                                  18           



                                                  1:45:00           



                                                  CDT, 2.44,           



                                                  m2             

 

     Saline            No                       Notes:           [facility]



     Flush 0.9%                                     (Same as:           l



               06:46:                               BD                                              Posiflush)           

 

     Acetaminoph            No                       Notes: Do           [facility]

[facility]



     en        -                               not exceed           l



               06:46:                               4 gm/day.                                            (Same as:           



                                                  Tylenol)           

 

     Acetaminoph            No                       Notes:           [facility]

[facility]



     en 325 MG /      -                               (Same as:           l



     Hydrocodone      06:46:                               Norco           [facility]

nn



     Bitartrate      00                                 325/5)  Do           



     5 MG Oral                                         not exceed           



     Tablet                                         4gm/day of           



                                                  acetaminop           



                                                  hen.           

 

     Reglan            No                       Notes:           [facility]



               5-04                               (Same as:           l



               04:27:                               Reglan)                                                           

 

     Benadryl            No                       Notes:           [facility]



               5-04                               (Same as:           l



               04:27:                               Benadryl)                                                           

 

     Magnesium            No                       Notes:           [facility]

a



     Sulfate                                     WASTE: F/P           l



               04:26:                               - Sink; E           [facility]



                                                -              



                                                  Municipal           



                                                  Trash Bin           

 

     Sodium            No                       1,000 mL,           [facility]

a



     Chloride      5-04                               1000           l



     0.9%      04:26:                               ml/hr,           [facility]



     (Bolus) IV      00                                 Infuse           



                                                  Over: 1           



                                                  hr, Route:           



                                                  IV, 1,000,           



                                                  Drug form:           



                                                  INJ, ONCE,           



                                                  Priority:           



                                                  STAT,           



                                                  Dosing           



                                                  Weight           



                                                  127.273           



                                                  kg, Start           



                                                  date:           



                                                  18           



                                                  23:26:00           



                                                  CDT, Stop           



                                                  date:           



                                                  18           



                                                  23:26:00           



                                                  CDT            

 

     Zosyn            No                       4.5 gm,           [facility]



                                              Route:           l



               04:10:                               IVPB,           [facility]



               00                                 ONCE,           



                                                  Dosing           



                                                  Weight           



                                                  127.273,           



                                                  kg,            



                                                  Priority:           



                                                  STAT,           



                                                  Start           



                                                  date:           



                                                  18           



                                                  23:10:00           



                                                  CDT, Stop           



                                                  date:           



                                                  18           



                                                  23:10:00           



                                                  CDT, ABX           



                                                  Indication           



                                                  :              



                                                  Bacteremia           

 

     Vancomycin            No                         mg:           Me

moria



                                              infuse           l



               04:10:                               over 2.5           Jose



               00                                 hours  For           



                                                  adult           



                                                  patients           



                                                  only:           



                                                  Round to           



                                                  nearest           



                                                  250 mg per           



                                                  Medical           



                                                  Staff           



                                                  approval           



                                                  ***            



                                                  MEDICATION           



                                                  WASTE ***           



                                                  Product           



                                                  Size:           



                                                  1000 mg           



                                                  Product           



                                                  Wasted:           



                                                  ___ mg           

 

     normal            No                       1,000 mL,           [facility]

a



     saline 0.9%                                     Rate: 75           l



     IV 1,000 mL      03:39:                               ml/hr,           Herm

nguyen



                                                Infuse           



                                                  over: 13.3           



                                                  hr, Route:           



                                                  IV, Dosing           



                                                  Weight           



                                                  127.273           



                                                  kg, Total           



                                                  Volume:           



                                                  1,000,           



                                                  Start           



                                                  date:           



                                                  18           



                                                  22:39:00           



                                                  CDT,           



                                                  Duration:           



                                                  30 day,           



                                                  Stop date:           



                                                  18           



                                                  22:38:00           



                                                  CDT, 2.36,           



                                                  m2             

 

     Acetaminoph            No                       Notes: Max           

[facility]



     en                                       acetaminop           l



               02:56:                               hen 4000           Jose



               00                                 mg/day (4           



                                                  gm/day).           



                                                  (Same as:           



                                                  Tylenol           



                                                  Extra           



                                                  Strength)           

 

     Rocephin            No                       Notes:           [facility]



                                              (Same As:           l



               02:21:                               Rocephin).           Jose



               00                                   ***           



                                                  MEDICATION           



                                                  WASTE ***           



                                                  Product           



                                                  Size:           



                                                  1000 mg           



                                                  Product           



                                                  Wasted:           



                                                  _0__ mg           

 

     Morphine            No                       4 mg,           [facility]



                                              Route:           l



               00:05:                               IVP, ONCE,           Jose



                                                Dosing           



                                                  Weight           



                                                  127.273,           



                                                  kg,            



                                                  Priority:           



                                                  STAT,           



                                                  Start           



                                                  date:           



                                                  18           



                                                  19:05:00           



                                                  CDT, Stop           



                                                  date:           



                                                  18           



                                                  19:05:00           



                                                  CDT            

 

     Benadryl            No                       Notes:           [facility]



               5-03                               (Same as:           l



               23:14:                               Benadryl)                                                           

 

     Benadryl            No                       Notes:           [facility]



               5-03                               (Same as:           l



               22:56:                               Benadryl)                                                           

 

     Morphine            No                       4 mg, 1           [facility]

a



               5-03                               mL, Route:           l



               22:56:                               IVP, Drug                                            form:           



                                                  SOLN,           



                                                  ONCE,           



                                                  Dosing           



                                                  Weight           



                                                  127.273,           



                                                  kg,            



                                                  Priority:           



                                                  STAT,           



                                                  Start           



                                                  date:           



                                                  18           



                                                  17:56:00           



                                                  CDT, Stop           



                                                  date:           



                                                  18           



                                                  17:56:00           



                                                  CDT            

 

     Tylenol Tylenol           Yes  [place]                1 tablet           [facility]

[facility]



     with with                                    as needed           [facility]



     Codeine #4 Codeine #4                                                   [facility]

[facility]



                                                                 l



                                                                 [facility]



                                                                 ent



                                                                 Clinics

 

     Macrobid Macrobid           Yes  [place]                1 capsule          

 [facility]



                                                  with food           [facility]



                                                                 [facility]



                                                                 l



                                                                 [facility]



                                                                 ent



                                                                 Clinics

 

     Pantoprazol Pantoprazol           Yes  [place]                1 tablet     

      [facility]



     e Sodium e Sodium                                                   [facility]



                                                                 [facility]



                                                                 l



                                                                 [facility]



                                                                 ent



                                                                 Clinics

 

     Collagenase Collagenase           Yes  [place]                1            

  [facility]



                                                  applicatio           [facility]



                                                  n to           [facility]



                                                  affected           l



                                                  area           [facility]



                                                                 ent



                                                                 Clinics







Vital Signs







             Vital Name   Observation Time Observation Value Comments     Source

 

             Systolic blood 2020 12:58:00 136 mm[Hg]                [facility]

 

             Diastolic blood 2020 12:58:00 78 mm[Hg]                 [facility]

[facility]

 

             Heart rate   2020 12:58:00 96 /min                   [facility]

 

             Body temperature 2020 12:58:00 35.78 Tiffanie                 [facility]

 

             Respiratory rate 2020 12:58:00 18 /min                   [facility]

 

             Oxygen saturation in 2020 12:58:00 96 /min                   

[facility]



             Arterial blood by                                        Medical Ce

nter



             Pulse oximetry                                        

 

             Respitory Rate 2018 17:30:00                           [facility]

al [facility]

 

             Systolic (mm Hg) 2018 17:30:00                           [facility]

rial [facility]

 

             Diastolic (mm Hg) 2018 17:30:00                           [facility]

orial [facility]

 

             Temperature Oral (F) 2018 17:30:00 98.4 F                    

[facility]

 

             Temperature Oral (F) 2018 16:23:00 98.6 F                    

[facility]

 

             Systolic (mm Hg) 2018 16:23:00                           [facility]

rial [place]

 

             Diastolic (mm Hg) 2018 16:23:00                           Mem

orial [facility]

 

             Respitory Rate 2018 14:00:00                           [facility]

al [place]

 

             Systolic (mm Hg) 2018 14:00:00                           [facility]

rial [facility]

 

             Diastolic (mm Hg) 2018 14:00:00                           Mem

orial [facility]

 

             Respitory Rate 2018 13:30:00                           [facility]

al [place]

 

             BMI Calculated 2018 10:16:00                           [facility]

al [place]

 

             Height       2018 10:16:00 149.86 cm                 [facility]

 [facility]

 

             Weight       2018 10:16:00                           [facility]

 [facility]

 

             Heart Rate   2018 10:16:00                           [facility]

 [facility]

 

             Temperature Oral (F) 2018 10:16:00 97.9 F                    

[facility]

 

             Temperature Oral (F) 2018 13:40:00 97.2 F                    

[facility]

 

             Systolic (mm Hg) 2018 13:40:00                           [facility]

rial [facility]

 

             Diastolic (mm Hg) 2018 13:40:00                           [facility]

[facility] [place]

 

             Heart Rate   2018 13:40:00                           [facility]

 [facility]

 

             Respitory Rate 2018 13:40:00                           [facility]

al [facility]

 

             Heart Rate   2018 05:24:00                           [facility]

 [facility]

 

             Systolic (mm Hg) 2018 05:24:00                           [facility]

rial [facility]

 

             Diastolic (mm Hg) 2018 05:24:00                           Mem

[facility] [place]

 

             Respitory Rate 2018 05:24:00                           [facility]

al [place]

 

             Temperature Oral (F) 2018 05:24:00 98.1 F                    

[facility] [place]

 

             Respitory Rate 2018 01:15:00                           [facility]

al [place]

 

             Systolic (mm Hg) 2018 01:15:00                           [facility]

rial Jose

 

             Diastolic (mm Hg) 2018 01:15:00                           Mem

orial [facility]

 

             Heart Rate   2018 01:15:00                           [facility]

 [place]

 

             Temperature Oral (F) 2018 01:15:00 98.1 F                    

[facility]

 

             Weight       2018 14:00:00                           [facility]

 [facility]

 

             Weight       2018 14:00:00                           [facility]

 [place]

 

             Weight       2018 14:00:00                           [facility]

 [place]

 

             BMI Calculated 2018 05:43:00                           Mem[facility]

al [facility]

 

             Height       2018 05:43:00 162.56 cm                 [facility]

 [place]

 

             Height       2018 22:41:00 149.86 cm                 [facility]

 [facility]

 

             BMI Calculated 2018 22:41:00                           Siri morales [place]







Procedures







                Procedure       Date / Time     Performing Clinician Source



                                Performed                       

 

                RHYTHM STRIP - SCAN 2020 14:11:01 Provider [facility]

 

                RHYTHM STRIP - SCAN 2020 15:32:49 Provider [facility]

 

                POCT-GLUCOSE METER 2020 12:52:00 [facility]by [facility]

 

                [facility]03[facility]

 

                POCT-GLUCOSE METER 2020-01-15 20:45:00 Darby [facility]

 

                POCT-GLUCOSE METER 2020-01-15 16:35:00 Darby [facility]

 

                POCT-GLUCOSE METER 2020-01-15 12:08:00 Salvador Pastor [facility]

 

                POCT-GLUCOSE METER 2020-01-15 08:35:00 Darby [facility]

 

                POCT-GLUCOSE METER 2020 21:02:00 [facility]

 

                MR LOWER EXTREMITY WITHOUT 2020 18:46:00 Salvador Pastor

[facility]

 

                POCT-GLUCOSE METER 2020 12:42:00 Darby Salvador [facility]

 

                POCT-GLUCOSE METER 2020 08:24:00 Salvador Pastor [facility]

 

                Cholecystectomy                                 [facility]

 

                Shunt of cerebral                                 [facility]

nn



                ventricle to extracranial                                 



                site                                            







Plan of Care







             Planned Activity Planned Date Details      Comments     Source

 

             Future Scheduled 2023-01-10   Lipid panel               [facility]

s -



             Test         00:00:00     (procedure) [code =              Medical 

Center



                                       48415321]                 

 

             Future Scheduled 2020   INFLUENZA VACCINE (#1)              C

[facility]



             Test         00:00:00     [code = INFLUENZA              Medical Ce

nter



                                       VACCINE (#1)]              

 

             Future Scheduled 2020-04-10   Hemoglobin A1c              [facility]

[facility] -



             Test         00:00:00     measurement               [facility] Center



                                       (procedure) [code =              



                                       81744841]                 

 

             Future Scheduled 1995   DIABETIC EYE EXAM              [facility]



             Test         00:00:00     [code = DIABETIC EYE              Medical

 Center



                                       EXAM]                     

 

             Future Scheduled 1995   Diabetic foot              [facility]

[facility] -



             Test         00:00:00     examination               [facility] Center



                                       (regime/therapy) [code              



                                       = 667092995]              

 

             Future Scheduled 1995   Urine screening for              [facility]



             Test         00:00:00     protein (procedure)              Medical 

Center



                                       [code = 953211710]              

 

             Future Scheduled 1991   PNEUMOCOCCAL VACCINE              [facility]



             Test         00:00:00     0-64 YRS (1 of 1 -              Medical C

enter



                                       PPSV23) [code =              



                                       PNEUMOCOCCAL VACCINE              



                                       0-64 YRS (1 of 1 -              



                                       PPSV23)]                  







Encounters







        Start   End     Encounter Admission Attending Care    Care    Encounter 

Source



        Date/Time Date/Time Type    Type    Clinicians Facility Department ID   

   

 

        2019 Outpatient                 [facility] [id number]

[id number] 



        11:11:26 23:59:59                                         08      

 

        2019 Outpatient                 [facility] [id number]

[id number] 



        11:16:47 23:59:59                                         07      

 

        2019-07-15 2019 Outpatient                 [facility] M[facility] [id number]

[id number] 



        10:52:03 23:59:59                                         06      

 

        2019 Outpatient                 [facility] M[facility] [id number]

[id number] 



        13:55:03 23:59:59                                         05      

 

        2018 [facility]         [facility]   [id number]

[id number] 



        04:12:00 12:24:00                 Dar ARAUJO                  2018 Outpatient                 [facility] [address]

[id number] [facility]



        11:15:00 11:15:00                         [place]   [address]         [facility]

s -



                                                Drive   [facility]                 Outpati



                                                                        ent



                                                                        Clinics

 

        2018 Outpatient                 [facility] [address]

[id number] [facility]



        11:30:00 11:30:00                         [place]   [address]         [facility]

s -



                                                Drive   [facility]



                                                Medicine                 Outpati



                                                                        ent



                                                                        Clinics

 

        2018 Outpatient                 [facility] [address]

[id number] [facility]



        15:41:00 15:41:00                         [place]   [address]         [facility]

s -



                                                Drive   [facility]



                                                Medicine                 Outpati



                                                                        ent



                                                                        Clinics

 

        2018 Outpatient                 [facility] [address]

[id number] [facility]



        15:42:00 15:42:00                         [place]   [address]         [facility]

s -



                                                Drive   [facility]



                                                Medicine                 Outpati



                                                                        ent



                                                                        Clinics

 

        2018 Outpatient                 [facility] [address]

[id number] [facility]



        11:00:00 11:00:00                         [place]   [address] [employer]         [employer]

s -



                                                Drive   [facility]



                                                Medicine                 Outpati



                                                                        ent



                                                                        Clinics

 

        2018 Outpatient         [facility]   551

[id number] 



        17:40:00 12:28:00                 Jamie                   Con[place]ita      







Results







           Test Description Test Time  Test Comments Results    Result Comments 

Source









                    POC-Glucose meter   2020 13:03:00 









                      Test Item  Value      Reference Range Interpretation Comme

nts









             POC-Glucose Meter (test code = 140 mg/dL           H         

   : [facility] [address] [facility]



             [address], [address]

30:



                                                                 /Techni

christina ID = [id number] for



                                                                 CLAUDIA DELATORRE

E

 

             Lab Interpretation (test code = Abnormal                           

    



             05654-0)                                            



[facility] [id number] 13:03:00





             Test Item    Value        Reference Range Interpretation Comments

 

             POC-GLUCOSE METER 140 mg/dL           H            : TESTED A

T [facility] [address]



             (BEAKER) (test code =                                        BERTNE

R [facility],



             1538)                                               [id number]:



                                                                 /Techni

christina ID



                                                                 = [id number] for ANANT CATES



POCT-GLUCOSE [id number] 09:15:00





             Test Item    Value        Reference Range Interpretation Comments

 

             POC-GLUCOSE METER 142 mg/dL           H            : TESTED A

T [facility] [address]



             (BEAKER) (test code =                                        [facility],



             153)                                               [address]:



                                                                 /Techni

christina ID



                                                                 = [id number] for ANANT CATES



POCT-GLUCOSE METER2020-01-15 20:56:00





             Test Item    Value        Reference Range Interpretation Comments

 

             POC-GLUCOSE METER 134 mg/dL           H            : TESTED A

T [facility] [address]



             (BEAKER) (test code =                                        [facility],



             1538)                                               [address]:



                                                                 /Techni

christina ID



                                                                 = [id number] for SHERRILL GUARDADO



POCT-GLUCOSE METER2020-01-15 16:46:00





             Test Item    Value        Reference Range Interpretation Comments

 

             POC-GLUCOSE METER 91 mg/dL                         : TESTED A

T [facility] [address]



             (BEAKER) (test code =                                        [facility],



             1538)                                               [address]:



                                                                 /Techni

christina ID =



                                                                 [id number] for ANANT HAMILTON



POCT-GLUCOSE METER2020-01-15 12:19:00





             Test Item    Value        Reference Range Interpretation Comments

 

             POC-GLUCOSE METER 237 mg/dL           H            : TESTED A

T [facility] [address]



             (BEAKER) (test code =                                        [facility],



             1538)                                               [address]:



                                                                 /Techni

christina ID



                                                                 = [id number] for ANANT CATES



MR, EXTREMITY, LOWER, WITHOUT CONTRAST, RIGHT2020-01-15 09:12:00FINAL REPORT 
PATIENT ID:   [id number] MRI of the right and left hindfoot without intravenous 
contrast History: Osteomyelitis, diabetic foot Comparison: Radiograph dated 
2020 Technique: Multiplanar multisequence MRI of the right and left 
hindfoot was performed without intravenous contrast using the hindfoot 
osteomyelitis protocol Findings: Right foot: Marked T1 and T2 hypointense soft 
tissue thickening is noted at the medial aspect of the right heel, likely to 
reflect scar tissue. There is also mild subcutaneous edema at the lateral aspect
of the mid foot and forefoot, incompletely imaged. No discrete drainable fluid 
collection is identified. There is no marrow signal abnormality to suggest 
osteomyelitis. There is a small nonspecific joint effusion at the ankle and 
subtalar joint. Scattered degenerative changes are noted. There is marked fatty 
atrophy of the distal leg and foot musculature. Severe flexor hallucis longus 
tendinopathy. No tenosynovitis. Left foot: There is a large area ofsoft tissue 
defect and granulation tissue at the posteromedial aspect of the heel, reaching 
the calcaneus, but there is no drainable fluid collection. Deformity is noted in
the hindfoot, and the forefoot appears adducted. No acute fracture. No marrow 
signal abnormality is identified to indicate acute osteomyelitis. There is no 
significant joint effusion. There is severe atrophy of the foot and distalleg 
musculature. No significant tenosynovitis identified. IMPRESSION: 
Granulation/scar tissue at themedial aspect of the heel bilaterally. No MR 
evidence of osteomyelitis. Severe muscle atrophy. Signed: Nguyen Cornejo MDReport 
Verified Date/Time:  01/15/2020 09:12:01 Reading Location: [address] Ortho 
Consult Reading Room        Electronically signed by: NGUYEN CORNEJO M.D. on 
01/15/2020 09:12 AMMR, EXTREMITY, LOWER, WITHOUT CONTRAST, LEFT2020-01-15 
09:12:00FINAL REPORT PATIENT ID:   [id number] MRI of the right and left hindfoot 
without intravenous contrast History: Osteomyelitis, diabetic foot Comparison: 
Radiograph dated 2020 Technique: Multiplanar multisequence MRI of the
right and left hindfoot was performed without intravenous contrast using the 
hindfoot osteomyelitis protocol Findings: Right foot: Marked T1 and T2 
hypointense soft tissue thickening is noted at the medial aspect of the right 
heel, likely to reflect scar tissue. There is also mild subcutaneous edema at 
the lateral aspect of the mid foot and forefoot, incompletely imaged. No 
discrete drainable fluid collection is identified. There is no marrow signal 
abnormality to suggest osteomyelitis. There is a small nonspecific joint 
effusion at the ankle and subtalar joint. Scattered degenerative changes are 
noted. There is marked fatty atrophy of the distal leg and foot musculature. 
Severe flexor hallucis longus tendinopathy. No tenosynovitis. Left foot: There 
is a large area ofsoft tissue defect and granulation tissue at the posteromedial
aspect of the heel, reaching the calcaneus, but there is no drainable fluid 
collection. Deformity is noted in the hindfoot, and the forefoot appears 
adducted. No acute fracture. No marrow signal abnormality is identified to 
indicate acute osteomyelitis. There is no significant joint effusion. There is 
severe atrophy of the foot and distalleg musculature. No significant 
tenosynovitis identified. IMPRESSION: Granulation/scar tissue at themedial 
aspect of the heel bilaterally. No MR evidence of osteomyelitis. Severe muscle 
atrophy. Signed: Nguyen Cornejo Verified Date/Time:  01/15/2020 09:12:01 
Reading Location: [facility] C013X Ortho Consult Reading Room        Electronically 
signed by: NGUYEN CORNEJO M.D. on 01/15/2020 09:12 AMMR lower extremity without IV 
contrast right side2020-01-15 09:12:00Interface, External Ris In - 01/15/2020  
9:14 AM CSTFINAL REPORT PATIENT ID:   [id number] MRI of the right and left 
hindfoot without intravenous contrast History: Osteomyelitis, diabetic foot 
Comparison:Radiograph dated 2020 Technique: Multiplanar multisequence
MRI of the right and left hindfoot was performed without intravenous contrast 
using the hindfoot osteomyelitis protocol Findings: Right foot: Marked T1 and T2
hypointense soft tissue thickening is noted at the medial aspect of the right 
heel, likely to reflect scar tissue. There is also mild subcutaneous edema at 
the lateral aspectof the mid foot and forefoot, incompletely imaged. No discrete
drainable fluid collection is identified. There is no marrow signal abnormality 
to suggest osteomyelitis. There is a small nonspecific joint effusion at the 
ankle and subtalar joint. Scattered degenerative changes are noted. There is 
marked fatty atrophy of the distal leg and foot musculature. Severe flexor 
hallucis longus tendinopathy. No tenosynovitis. Left foot: There is a large area
of soft tissue defect and granulation tissue at theposteromedial aspect of the 
heel, reaching the calcaneus, but there is no drainable fluid collection. 
Deformity is noted in the hindfoot, and the forefoot appears adducted. No acute 
fracture. No marrowsignal abnormality is identified to indicate acute 
osteomyelitis. There is no significant joint effusion. There is severe atrophy 
of the foot and distal leg musculature. No significant tenosynovitis identified.
IMPRESSION: Granulation/scar tissue at the medial aspect of the heel 
bilaterally. No MR evidence of osteomyelitis. Severe muscle atrophy. Signed: 
Nguyen Cornejo Verified Date/Time:  01/15/2020 09:12:01 Reading Location: [facility] C013X Ortho Consult Reading Room        Electronically signed by: NGUYEN CORNEJO M.D. on 01/15/2020 09:12 [facility] contrast left side2020-01-15 09:12:00Interface, External Ris In - 
01/15/2020  9:14 AM CSTFINAL REPORT PATIENT ID:   [id number] MRI of the right and 
left hindfoot without intravenous contrast History: Osteomyelitis, diabetic foot
Comparison:Radiograph dated 2020 Technique: Multiplanar multisequence
MRI of the right and left hindfoot was performed without intravenous contrast 
using the hindfoot osteomyelitis protocol Findings: Right foot: Marked T1 and T2
hypointense soft tissue thickening is noted at the medial aspect of the right 
heel, likely to reflect scar tissue. There is also mild subcutaneous edema at 
the lateral aspectof the mid foot and forefoot, incompletely imaged. No discrete
drainable fluid collection is identified. There is no marrow signal abnormality 
to suggest osteomyelitis. There is a small nonspecific joint effusion at the 
ankle and subtalar joint. Scattered degenerative changes are noted. There is 
marked fatty atrophy of the distal leg and foot musculature. Severe flexor 
hallucis longus tendinopathy. No tenosynovitis. Left foot: There is a large area
of soft tissue defect and granulation tissue at theposteromedial aspect of the 
heel, reaching the calcaneus, but there is no drainable fluid collection. 
Deformity is noted in the hindfoot, and the forefoot appears adducted. No acute 
fracture. No marrowsignal abnormality is identified to indicate acute 
osteomyelitis. There is no significant joint effusion. There is severe atrophy 
of the foot and distal leg musculature. No significant tenosynovitis identified.
IMPRESSION: Granulation/scar tissue at the medial aspect of the heel 
bilaterally. No MR evidence of osteomyelitis. Severe muscle atrophy. Signed: 
Nguyen Cornejo Verified Date/Time:  01/15/2020 09:12:01 Reading Location: [facility]
B1 C013X Ortho Consult Reading Room        Electronically signed by: NGUYEN CORNEJO M.D. on 01/15/2020 09:12 [facility]
2020-01-15 08:47:00





             Test Item    Value        Reference Range Interpretation Comments

 

             POC-GLUCOSE METER 264 mg/dL           H            : TESTED A

T [facility] [address]



             (KUN) (test code =                                        [place],



             1538)                                               [address]:



                                                                 /Techni

christina ID



                                                                 = [id number] for ANANT CATES



ISLET CELL AB SCR2020-01-15 07:43:00





             Test Item    Value        Reference Range Interpretation Comments

 

             ISLET CELL AB Refer to individual                           



             AUTOVERIFICATION (test Islet Cell Ab                           



             code = 2556) and/or Islet Cell                           



                          Ab Titer results.                           



POCT-GLUCOSE [id number] 21:27:00





             Test Item    Value        Reference Range Interpretation Comments

 

             POC-GLUCOSE METER 130 mg/dL           H            : TESTED A

T [facility]



             [facility] =                                        [facility],



             153)                                               [address]:



                                                                 /Techni

christina ID



                                                                 = [id number] for KRANTHI PIERRE



BLOOD [id number] 13:00:00





             Test Item    Value        Reference Range Interpretation Comments

 

             CULTURE (BEAKER) (test No growth in 5 days                         

  



             code = 1095)                                        



BLOOD [id number] 13:00:00





             Test Item    Value        Reference Range Interpretation Comments

 

             CULTURE (BEAKER) (test No growth in 5 days                         

  



             code = 1095)                                        



POCT-GLUCOSE [id number] 12:57:00





             Test Item    Value        Reference Range Interpretation Comments

 

             POC-GLUCOSE METER 138 mg/dL           H            : TESTED A

[facility]



             ([facility]) (test code =                                        [facility],



             153)                                               [address]:



                                                                 /Techni

christina ID



                                                                 = [id number] for [place]

LLIS,



                                                                 BARBARA



POCT-GLUCOSE [id number] 08:43:00





             Test Item    Value        Reference Range Interpretation Comments

 

             POC-GLUCOSE METER 226 mg/dL           H            : TESTED A

[facility]



             ([facility]) (test code =                                        [facility],



             153)                                               [address]:



                                                                 /Techni

christina ID



                                                                 = [id number] for [place]

LLIS,



                                                                 BARBARA



POCT-GLUCOSE [id number] 21:11:00





             Test Item    Value        Reference Range Interpretation Comments

 

             POC-GLUCOSE METER 254 mg/dL           H            : Notified

 RN/MD:



             ([facility]) (test code =                                        TESTED

 AT [facility] [address]



             )                                               [facility],



                                                                 [address]:



                                                                 /Techni

christina ID



                                                                 = [id number] for KRANTHI PIERRE



POCT-GLUCOSE [id number] 21:02:00





             Test Item    Value        Reference Range Interpretation Comments

 

             POC-GLUCOSE METER 177 mg/dL           H            : TESTED A

T [facility] [address]20



             ([employer]) (test code =                                        [address] [place],



             1538)                                               [address]:



                                                                 /Techni

christina ID



                                                                 = [id number] for [place]

LLIS,



                                                                 BARBARA



POCT-GLUCOSE [id number] 12:31:00





             Test Item    Value        Reference Range Interpretation Comments

 

             POC-GLUCOSE METER 215 mg/dL           H            : TESTED A

T [facility] [address]



             ([employer]) (test code =                                        [place],



             1538)                                               [address]:



                                                                 /Techni

christina ID



                                                                 = [id number] for [place]

LLIS,



                                                                 BARBARA



WOUND CULTURE + GRAM [id number] 10:10:00





             Test Item    Value        Reference    Interpretation Comments



                                       Range                     

 

             CULTURE (BEAKER) PROTEUS MIRABILIS              A            1+ Pro

teus



             (test code = 1095)                                        mirabilis

 

             Amikacin (test code =                           S            



             1)                                                  

 

             Ampicillin +                           S            



             Sulbactam (test code                                        



             = 6)                                                

 

             Aztreonam (test code                           S            



             = 32)                                               

 

             Cefepime (test code =                           S            



             51)                                                 

 

             Cefoxitin (test code                           R            



             = 68)                                               

 

             Ceftazidime (test                           S            



             code = 27)                                          

 

             Ceftriaxone (test                           S            



             code = 52)                                          

 

             Ertapenem (test code                           S            



             = 38)                                               

 

             Gentamicin (test code                           S            



             = 18)                                               

 

             Levofloxacin (test                           R            



             code = 22)                                          

 

             Meropenem (test code                           S            



             = 34)                                               

 

             Piperacillin +                           S            



             Tazobactam (test code                                        



             = 29)                                               

 

             Tetracycline (test                           R            



             code = 2)                                           

 

             Tobramycin (test code                           S            



             = 25)                                               

 

             Trimethoprim +                           R            



             Sulfamethoxazole                                        



             (test code = 47)                                        

 

             CULTURE (BEAKER) METHICILLIN               A            1+ Methicil

bryn



             (test code = 1095) RESISTANT                              resistant



                          STAPHYLOCOCCUS                           Staphylococcu

s



                          AUREUS                                 aureus

 

             Clindamycin (test                           R            



             code = 10)                                          

 

             Erythromycin (test                           R            



             code = 4)                                           

 

             Linezolid (test code                           S            



             = 40)                                               

 

             Nitrofurantoin (test                           S            



             code = 23)                                          

 

             Oxacillin (test code                           R            



             = 14)                                               

 

             Rifampin (test code =                           S            



             43)                                                 

 

             Tetracycline (test                           S            



             code = 2)                                           

 

             Trimethoprim +                           S            



             Sulfamethoxazole                                        



             (test code = 47)                                        

 

             Vancomycin (test code                           S            



             = 13)                                               

 

             CULTURE (BEAKER) ESCHERICHIA COLI              A            <1+ Esc

herichia



             (test code = 1095)                                        coliESBL 

Positive

 

             Amikacin (test code =                           S            



             1)                                                  

 

             Ampicillin +                           R            



             Sulbactam (test code                                        



             = 6)                                                

 

             Aztreonam (test code                           R            



             = 32)                                               

 

             Cefepime (test code =                           R            



             51)                                                 

 

             Cefoxitin (test code                           R            



             = 68)                                               

 

             Ceftazidime (test                           R            



             code = 27)                                          

 

             Ceftriaxone (test                           R            



             code = 52)                                          

 

             Ertapenem (test code                           S            



             = 38)                                               

 

             Gentamicin (test code                           S            



             = 18)                                               

 

             Levofloxacin (test                           R            



             code = 22)                                          

 

             Meropenem (test code                           S            



             = 34)                                               

 

             Nitrofurantoin (test                           S            



             code = 23)                                          

 

             Piperacillin +                           R            



             Tazobactam (test code                                        



             = 29)                                               

 

             Tetracycline (test                           S            



             code = 2)                                           

 

             Tobramycin (test code                           S            



             = 25)                                               

 

             Trimethoprim +                           R            



             Sulfamethoxazole                                        



             (test code = 47)                                        

 

             CULTURE (BEAKER)                           A            Beta-hemoly

tic



             (test code = 1095)                                        streptoco

ccus



                                                                 group B, by



                                                                 serological



                                                                 grouping

 

             GRAM STAIN RESULT 3+ WBCs                                



             (BEAKER) (test code =                                        



             1123)                                               

 

             GRAM STAIN RESULT 1+ gram negative                           



             (BEAKER) (test code = rods                                   



             [id number])                                             

 

             GRAM STAIN RESULT 2+ gram positive                           



             (BEAKER) (test code = rods                                   



             881798)                                             

 

             GRAM STAIN RESULT <1+ gram negative                           



             (BEAKER) (test code = coccobacilli                           



             601723)                                             

 

             GRAM STAIN RESULT 2+ gram positive                           



             (BEAKER) (test code = cocci in pairs                           



             546887)                                             



POCT-GLUCOSE [id number] 08:52:00





             Test Item    Value        Reference Range Interpretation Comments

 

             POC-GLUCOSE METER 209 mg/dL           H            : TESTED A

T [facility] 6720



             (BEAKER) (test code =                                        [facility],



             153)                                               [id number]:



                                                                 /Techni

christina ID



                                                                 = [id number] for [place]

LLIS,



                                                                 BARBARA



POCT-GLUCOSE [id number] 21:26:00





             Test Item    Value        Reference Range Interpretation Comments

 

             POC-GLUCOSE METER 255 mg/dL           H            : TESTED A

T [facility] 6720



             (BEAKER) (test code =                                        [facility],



             153)                                               [address]:



                                                                 /Techni

christina ID



                                                                 = [id number] for CR

RADHA



                                                                 TIA



POCT-GLUCOSE [id number] 17:34:00





             Test Item    Value        Reference Range Interpretation Comments

 

             POC-GLUCOSE METER 227 mg/dL           H            : TESTED A

T [facility] 6720



             (BEAKER) (test code =                                        [facility],



             153)                                               [address]:



                                                                 /Techni

christina ID



                                                                 = [id number] for WASSERMAN

NAKITA HOFFMAN



POCT-GLUCOSE [id number] 11:28:00





             Test Item    Value        Reference Range Interpretation Comments

 

             POC-GLUCOSE METER 282 mg/dL           H            : TESTED A

T [facility] 6720



             (BEAKER) (test code =                                        [facility],



             [facility])                                               [address]:



                                                                 /Techni

christina ID



                                                                 = [id number] for NAKITA JENSEN



POCT-GLUCOSE [id number] 08:19:00





             Test Item    Value        Reference Range Interpretation Comments

 

             POC-GLUCOSE METER 329 mg/dL           H            : TESTED A

T [facility] 6720



             (BEAKER) (test code =                                        [facility],



             [facility])                                               [address]:



                                                                 /Techni

christina ID



                                                                 = [id number] for ANANT CATES



POCT-GLUCOSE [id number] 21:32:00





             Test Item    Value        Reference Range Interpretation Comments

 

             POC-GLUCOSE METER 275 mg/dL           H            : TESTED A

T [facility] 6720



             ([employer]) (test code =                                        [facility],



             [facility])                                               [address]:



                                                                 /Techni

christina ID



                                                                 = [id number] for SHERRILL GUARDADO



POCT-GLUCOSE [id number] 17:47:00





             Test Item    Value        Reference Range Interpretation Comments

 

             POC-GLUCOSE METER 304 mg/dL           H            : TESTED A

T [facility] 6720



             (BEAKER) (test code =                                        [facility],



             [facility])                                               [address]:



                                                                 /Techni

christina ID



                                                                 = [id number] for [place]

RIN,



                                                                 LUIZA



URINE [id number] 15:21:00





             Test Item    Value        Reference Range Interpretation Comments

 

             CULTURE ([employer])                           A            >100,000 co

l/mL



             (test code = 1095)                                        Beta-hemo

lytic



                                                                 streptococcus g

roup



                                                                 B, by serologic

al



                                                                 grouping

 

             CULTURE ([employer]) PROTEUS                   A            80-89,000 c

ol/mL



             (test code = 1095) MIRABILIS                              Proteus



                                                                 mirabilisESBL



                                                                 Positive

 

             Amikacin (test code =                           S            



             1)                                                  

 

             Ampicillin +                           R            



             Sulbactam (test code                                        



             = 6)                                                

 

             Aztreonam (test code                           R            



             = 32)                                               

 

             Cefepime (test code =                           R            



             51)                                                 

 

             Cefoxitin (test code                           R            



             = 68)                                               

 

             Ceftazidime (test                           R            



             code = 27)                                          

 

             Ceftriaxone (test                           R            



             code = 52)                                          

 

             Ertapenem (test code                           S            



             = 38)                                               

 

             Gentamicin (test code                           R            



             = 18)                                               

 

             Levofloxacin (test                           R            



             code = 22)                                          

 

             Meropenem (test code                           S            



             = 34)                                               

 

             Nitrofurantoin (test                           R            



             code = 23)                                          

 

             Tetracycline (test                           R            



             code = 2)                                           

 

             Tobramycin (test code                           R            



             = 25)                                               



POCT-GLUCOSE [id number] 12:16:00





             Test Item    Value        Reference Range Interpretation Comments

 

             POC-GLUCOSE METER 337 mg/dL           H            : TESTED A

T [facility] 6720



             (BEAKER) (test code =                                        [facility],



             1538)                                               [id number]:



                                                                 /Techni

christina ID



                                                                 = [id number] for HAWK WAN,



                                                                 LUIZA



BASIC METABOLIC [id number] 10:39:00





             Test Item    Value        Reference Range Interpretation Comments

 

             SODIUM (BEAKER) 134 meq/L    136-145      L            



             (test code = 381)                                        

 

             POTASSIUM (BEAKER) 4.6 meq/L    3.5-5.1                   



             (test code = 379)                                        

 

             CHLORIDE (BEAKER) 105 meq/L                        



             (test code = 382)                                        

 

             CO2 (BEAKER) (test 20 meq/L     22-29        L            



             code = 355)                                         

 

             BLOOD UREA NITROGEN 14 mg/dL     7-21                      



             (BEAKER) (test code                                        



             = 354)                                              

 

             CREATININE (BEAKER) 0.97 mg/dL   0.57-1.25                 



             (test code = 358)                                        

 

             GLUCOSE RANDOM 429 mg/dL           HH           



             (BEAKER) (test code                                        



             = 652)                                              

 

             CALCIUM (BEAKER) 8.9 mg/dL    8.4-10.2                  



             (test code = 697)                                        

 

             EGFR (BEAKER) (test 107 mL/min/1.73                           ESTIM

ATED GFR IS



             code = 1092) sq m                                   NOT AS ACCURATE

 AS



                                                                 CREATININE



                                                                 CLEARANCE IN



                                                                 PREDICTING



                                                                 GLOMERULAR



                                                                 FILTRATION RATE

.



                                                                 ESTIMATED GFR I

S



                                                                 NOT APPLICABLE 

FOR



                                                                 DIALYSIS PATIEN

TS.



 ID - MAGAN FPOCT-GLUCOSE [id number] 08:29:00





             Test Item    Value        Reference Range Interpretation Comments

 

             POC-GLUCOSE METER 395 mg/dL           H            : TESTED A

T [facility] 6720



             (BEAKER) (test code =                                        [facility],



             1538)                                               [address]:



                                                                 /Techni

christina ID



                                                                 = [id number] for HAWK WAN,



                                                                 LUIZA



CBC W/PLT COUNT &amp; AUTO [id number] 06:40:00





             Test Item    Value        Reference Range Interpretation Comments

 

             WHITE BLOOD CELL COUNT (BEAKER) 7.2 K/ L     3.5-10.5              

    



             (test code = 775)                                        

 

             RED BLOOD CELL COUNT (BEAKER) 5.48 M/ L    4.63-6.08               

  



             (test code = 761)                                        

 

             HEMOGLOBIN (BEAKER) (test code = 15.1 GM/DL   13.7-17.5            

     



             410)                                                

 

             HEMATOCRIT (BEAKER) (test code = 46.4 %       40.1-51.0            

     



             411)                                                

 

             MEAN CORPUSCULAR VOLUME (BEAKER) 84.7 fL      79.0-92.2            

     



             (test code = 753)                                        

 

             MEAN CORPUSCULAR HEMOGLOBIN 27.6 pg      25.7-32.2                 



             (BEAKER) (test code = 751)                                        

 

             MEAN CORPUSCULAR HEMOGLOBIN CONC 32.5 GM/DL   32.3-36.5            

     



             (BEAKER) (test code = 752)                                        

 

             RED CELL DISTRIBUTION WIDTH 15.5 %       11.6-14.4    H            



             (BEAKER) (test code = 412)                                        

 

             PLATELET COUNT (BEAKER) (test 315 K/CU MM  150-450                 

  



             code = 756)                                         

 

             MEAN PLATELET VOLUME (BEAKER) 10.5 fL      9.4-12.4                

  



             (test code = 754)                                        

 

             NUCLEATED RED BLOOD CELLS 0 /100 WBC   0-0                       



             (BEAKER) (test code = 413)                                        

 

             NEUTROPHILS RELATIVE PERCENT 62 %                                  

 



             (BEAKER) (test code = 429)                                        

 

             LYMPHOCYTES RELATIVE PERCENT 26 %                                  

 



             (BEAKER) (test code = 430)                                        

 

             MONOCYTES RELATIVE PERCENT 9 %                                    



             (BEAKER) (test code = 431)                                        

 

             EOSINOPHILS RELATIVE PERCENT 2 %                                   

 



             (BEAKER) (test code = 432)                                        

 

             BASOPHILS RELATIVE PERCENT 0 %                                    



             (BEAKER) (test code = 437)                                        

 

             NEUTROPHILS ABSOLUTE COUNT 4.48 K/ L    1.78-5.38                 



             (BEAKER) (test code = 670)                                        

 

             LYMPHOCYTES ABSOLUTE COUNT 1.87 K/ L    1.32-3.57                 



             (BEAKER) (test code = 414)                                        

 

             MONOCYTES ABSOLUTE COUNT (BEAKER) 0.62 K/ L    0.30-0.82           

      



             (test code = 415)                                        

 

             EOSINOPHILS ABSOLUTE COUNT 0.17 K/ L    0.04-0.54                 



             (BEAKER) (test code = 416)                                        

 

             BASOPHILS ABSOLUTE COUNT (BEAKER) 0.03 K/ L    0.01-0.08           

      



             (test code = 417)                                        

 

             IMMATURE GRANULOCYTES-RELATIVE 1 %          0-1                    

   



             PERCENT (BEAKER) (test code =                                      

  



             2801)                                               



POCT-GLUCOSE METER2020-01-10 19:55:00





             Test Item    Value        Reference Range Interpretation Comments

 

             POC-GLUCOSE METER 369 mg/dL           H            : TESTED A

T [facility] 6720



             (BEAKER) (test code =                                        [facility],



             1538)                                               [address]:



                                                                 /Techni

christina ID



                                                                 = [id number] for SHERRILL GUARDADO



POCT-GLUCOSE METER2020-01-10 16:45:00





             Test Item    Value        Reference Range Interpretation Comments

 

             POC-GLUCOSE METER 272 mg/dL           H            : TESTED A

T [facility] 6720



             (BEAKER) (test code =                                        [facility],



             1538)                                               [address]:



                                                                 /Techni

christina ID



                                                                 = [id number] for SARAH VIDES



POCT-GLUCOSE METER2020-01-10 11:40:00





             Test Item    Value        Reference Range Interpretation Comments

 

             POC-GLUCOSE METER 277 mg/dL           H            : TESTED A

T [facility] 6720



             (BEAKER) (test code =                                        [facility],



             1538)                                               [address]:



                                                                 /Techni

christina ID



                                                                 = [id number] for SARAH VIDES



BASIC METABOLIC PANEL2020-01-10 10:22:00





             Test Item    Value        Reference Range Interpretation Comments

 

             SODIUM (BEAKER) 132 meq/L    136-145      L            



             (test code = 381)                                        

 

             POTASSIUM (BEAKER) 4.4 meq/L    3.5-5.1                   



             (test code = 379)                                        

 

             CHLORIDE (BEAKER) 103 meq/L                        



             (test code = 382)                                        

 

             CO2 (BEAKER) (test 21 meq/L     22-29        L            



             code = 355)                                         

 

             BLOOD UREA NITROGEN 14 mg/dL     7-21                      



             (BEAKER) (test code                                        



             = 354)                                              

 

             CREATININE (BEAKER) 0.89 mg/dL   0.57-1.25                 



             (test code = 358)                                        

 

             GLUCOSE RANDOM 428 mg/dL           HH           



             (BEAKER) (test code                                        



             = 652)                                              

 

             CALCIUM (BEAKER) 9.2 mg/dL    8.4-10.2                  



             (test code = 697)                                        

 

             EGFR (BEAKER) (test 119 mL/min/1.73                           ESTIM

ATED GFR IS



             code = 1092) sq m                                   NOT AS ACCURATE

 AS



                                                                 CREATININE



                                                                 CLEARANCE IN



                                                                 PREDICTING



                                                                 GLOMERULAR



                                                                 FILTRATION RATE

.



                                                                 ESTIMATED GFR I

S



                                                                 NOT APPLICABLE 

FOR



                                                                 DIALYSIS PATIEN

TS.



 ID - NTPLIPID PANEL2020-01-10 10:17:00





             Test Item    Value        Reference Range Interpretation Comments

 

             TRIGLYCERIDES (BEAKER) (test code = 692 mg/dL                      

        



             540)                                                

 

             CHOLESTEROL (BEAKER) (test code = 196 mg/dL                        

      



             631)                                                

 

             HDL CHOLESTEROL (BEAKER) (test code 24 mg/dL                       

        



             = 976)                                              



Calculated LDL not valid if triglyceride &gt;400 mg/dLTriglyceride Reference 
Range:   Low Risk  &lt;150   Borderline    150-199   High Risk     200-499   
Very High Risk  &gt;=500Cholesterol Reference Range:   Low Risk         &lt;200 
 Borderline    200-239    High Risk        &gt;240HDL Cholesterol Reference 
Range:   Low Risk         &gt;=60   High Risk         &lt;40LDL Cholesterol 
ReferenceRange:   Optimal          &lt;100   Near Optimal  100-129   Borderline 
  130-159   High          160-189   Very High       &gt;=190    ID - NTP
POCT-GLUCOSE METER2020-01-10 08:28:00





             Test Item    Value        Reference Range Interpretation Comments

 

             POC-GLUCOSE METER 388 mg/dL           H            : TESTED A

T [facility] [address]



             (BEAKER) (test code =                                        [facility] [place],



             1538)                                               [address]:



                                                                 /Techni

christina ID



                                                                 = [id number] for ANANT CATES



HEMOGLOBIN A1C2020-01-10 07:54:00





             Test Item    Value        Reference Range Interpretation Comments

 

             HEMOGLOBIN A1C (BEAKER) (test code = 10.4 %       4.3-6.1      H   

         



             368)                                                



CBC W/PLT COUNT &amp; AUTO DIFFERENTIAL2020-01-10 05:56:00





             Test Item    Value        Reference Range Interpretation Comments

 

             WHITE BLOOD CELL COUNT (BEAKER) 6.5 K/ L     3.5-10.5              

    



             (test code = 775)                                        

 

             RED BLOOD CELL COUNT (BEAKER) 5.21 M/ L    4.63-6.08               

  



             (test code = 761)                                        

 

             HEMOGLOBIN (BEAKER) (test code = 14.6 GM/DL   13.7-17.5            

     



             410)                                                

 

             HEMATOCRIT (BEAKER) (test code = 44.3 %       40.1-51.0            

     



             411)                                                

 

             MEAN CORPUSCULAR VOLUME (BEAKER) 85.0 fL      79.0-92.2            

     



             (test code = 753)                                        

 

             MEAN CORPUSCULAR HEMOGLOBIN 28.0 pg      25.7-32.2                 



             (BEAKER) (test code = 751)                                        

 

             MEAN CORPUSCULAR HEMOGLOBIN CONC 33.0 GM/DL   32.3-36.5            

     



             (BEAKER) (test code = 752)                                        

 

             RED CELL DISTRIBUTION WIDTH 15.6 %       11.6-14.4    H            



             (BEAKER) (test code = 412)                                        

 

             PLATELET COUNT (BEAKER) (test 294 K/CU MM  150-450                 

  



             code = 756)                                         

 

             MEAN PLATELET VOLUME (BEAKER) 11.2 fL      9.4-12.4                

  



             (test code = 754)                                        

 

             NUCLEATED RED BLOOD CELLS 0 /100 WBC   0-0                       



             (BEAKER) (test code = 413)                                        

 

             NEUTROPHILS RELATIVE PERCENT 56 %                                  

 



             (BEAKER) (test code = 429)                                        

 

             LYMPHOCYTES RELATIVE PERCENT 31 %                                  

 



             (BEAKER) (test code = 430)                                        

 

             MONOCYTES RELATIVE PERCENT 10 %                                   



             (BEAKER) (test code = 431)                                        

 

             EOSINOPHILS RELATIVE PERCENT 2 %                                   

 



             (BEAKER) (test code = 432)                                        

 

             BASOPHILS RELATIVE PERCENT 1 %                                    



             (BEAKER) (test code = 437)                                        

 

             NEUTROPHILS ABSOLUTE COUNT 3.66 K/ L    1.78-5.38                 



             (BEAKER) (test code = 670)                                        

 

             LYMPHOCYTES ABSOLUTE COUNT 2.03 K/ L    1.32-3.57                 



             (BEAKER) (test code = 414)                                        

 

             MONOCYTES ABSOLUTE COUNT (BEAKER) 0.63 K/ L    0.30-0.82           

      



             (test code = 415)                                        

 

             EOSINOPHILS ABSOLUTE COUNT 0.15 K/ L    0.04-0.54                 



             (BEAKER) (test code = 416)                                        

 

             BASOPHILS ABSOLUTE COUNT (BEAKER) 0.03 K/ L    0.01-0.08           

      



             (test code = 417)                                        

 

             IMMATURE GRANULOCYTES-RELATIVE 1 %          0-1                    

   



             PERCENT (BEAKER) (test code =                                      

  



             2801)                                               



POCT-GLUCOSE [id number] 23:47:00





             Test Item    Value        Reference Range Interpretation Comments

 

             POC-GLUCOSE METER 359 mg/dL           H            : Notified

 RN/MD:



             ([facility]) (test code =                                        TESTED

 AT [place] [address]



             1538)                                               [facility],



                                                                 [id number]:



                                                                 /Techni

christina ID



                                                                 = [id number] for



                                                                 ALESSIA HUIZAR



POCT-GLUCOSE [id number] 21:13:00





             Test Item    Value        Reference Range Interpretation Comments

 

             POC-GLUCOSE METER 315 mg/dL           H            : TESTED A

T [facility] [address]



             ([facility]) (test code =                                        [facility],



             1538)                                               [id number]:



                                                                 /Techni

christina ID



                                                                 = [id number] for Sm

Nicki gresham



POCT-GLUCOSE [id number] 21:13:00





             Test Item    Value        Reference Range Interpretation Comments

 

             POC-GLUCOSE METER 331 mg/dL           H            : TESTED A

T [facility] [address]



             (BEAKER) (test code =                                        [facility],



             1538)                                               [id number]:



                                                                 /Techni

christina ID



                                                                 = [id number] for RO

OSCAR KUO



POCT-GLUCOSE [id number] 10:30:00





             Test Item    Value        Reference Range Interpretation Comments

 

             POC-GLUCOSE METER 341 mg/dL           H            : TESTED A

T [facility] [address]



             (BEAKER) (test code =                                        [facility],



             1538)                                               [id number]:



                                                                 /Techni

christina ID



                                                                 = [id number] for Sm

marga,



                                                                 Nicki



CBC W/PLT COUNT &amp; AUTO [id number] 08:48:00





             Test Item    Value        Reference Range Interpretation Comments

 

             WHITE BLOOD CELL COUNT (BEAKER) 6.7 K/ L     3.5-10.5              

    



             (test code = 775)                                        

 

             RED BLOOD CELL COUNT (BEAKER) 5.28 M/ L    4.63-6.08               

  



             (test code = 761)                                        

 

             HEMOGLOBIN (BEAKER) (test code = 14.6 GM/DL   13.7-17.5            

     



             410)                                                

 

             HEMATOCRIT (BEAKER) (test code = 44.9 %       40.1-51.0            

     



             411)                                                

 

             MEAN CORPUSCULAR VOLUME (BEAKER) 85.0 fL      79.0-92.2            

     



             (test code = 753)                                        

 

             MEAN CORPUSCULAR HEMOGLOBIN 27.7 pg      25.7-32.2                 



             (BEAKER) (test code = 751)                                        

 

             MEAN CORPUSCULAR HEMOGLOBIN CONC 32.5 GM/DL   32.3-36.5            

     



             (BEAKER) (test code = 752)                                        

 

             RED CELL DISTRIBUTION WIDTH 15.3 %       11.6-14.4    H            



             (BEAKER) (test code = 412)                                        

 

             PLATELET COUNT (BEAKER) (test 300 K/CU MM  150-450                 

  



             code = 756)                                         

 

             MEAN PLATELET VOLUME (BEAKER) 10.4 fL      9.4-12.4                

  



             (test code = 754)                                        

 

             NUCLEATED RED BLOOD CELLS 0 /100 WBC   0-0                       



             (BEAKER) (test code = 413)                                        



(MANUAL DIFFERENTIAL)2020 08:48:00





             Test Item    Value        Reference Range Interpretation Comments

 

             NEUTROPHILS - REL (DIFF) (BEAKER) 69 %                             

      



             (test code = 1359)                                        

 

             LYMPHOCYTES - REL (DIFF) (BEAKER) 25 %                             

      



             (test code = 1360)                                        

 

             MONOCYTES - REL (DIFF) (BEAKER) 3 %                                

    



             (test code = 1361)                                        

 

             EOSINOPHILS - REL (DIFF) (BEAKER) 3 %                              

      



             (test code = 1362)                                        

 

             BASOPHILS - REL (DIFF) (BEAKER) 0 %                                

    



             (test code = 1363)                                        

 

             NEUTROPHILS - ABS (DIFF) (BEAKER) 4.62 K/ L    1.80-8.00           

      



             (test code = 1365)                                        

 

             LYMPHOCYTES - ABS (DIFF) (BEAKER) 1.68 K/ L    1.48-4.50           

      



             (test code = 1366)                                        

 

             MONOCYTES - ABS (DIFF) (BEAKER) 0.20 K/ L    0.00-1.30             

    



             (test code = 1367)                                        

 

             EOSINOPHILS - ABS (DIFF) (BEAKER) 0.20 K/ L    0.00-0.50           

      



             (test code = 1368)                                        

 

             BASOPHILS - ABS (DIFF) (BEAKER) 0.00 K/ L    0.00-0.20             

    



             (test code = 1369)                                        

 

             TOTAL COUNTED (BEAKER) (test code = 100                            

        



             1351)                                               

 

             PLT MORPHOLOGY (BEAKER) (test code Normal                          

       



             = 486)                                              

 

             RBC MORPHOLOGY (BEAKER) (test code Normal                          

       



             = 762)                                              

 

             SMUDGE CELLS (BEAKER) (test code = Present                         

       



             1371)                                               



HEMOGLOBIN [id number] 06:39:00





             Test Item    Value        Reference Range Interpretation Comments

 

             HEMOGLOBIN A1C (BEAKER) (test code = 10.5 %       4.3-6.1      H   

         



             368)                                                



LIPID [id number] 04:57:00





             Test Item    Value        Reference Range Interpretation Comments

 

             TRIGLYCERIDES (BEAKER) 1251 mg/dL                             Speci

men moderately



             (test code = 540)                                        hemolyzed

 

             CHOLESTEROL (BEAKER) 215 mg/dL                              Specime

n moderately



             (test code = 631)                                        hemolyzed

 

             HDL CHOLESTEROL 22 mg/dL                               



             (BEAKER) (test code =                                        



             976)                                                



Calculated LDL not valid if triglyceride &gt;400 mg/dLTriglyceride Reference 
Range:   Low Risk  &lt;150   Borderline    150-199   High Risk     200-499   
Very High Risk  &gt;=500Cholesterol Reference Range:   Low Risk         &lt;200 
 Borderline    200-239    High Risk        &gt;240HDL Cholesterol Reference 
Range:   Low Risk         &gt;=60   High Risk         &lt;40LDL Cholesterol 
ReferenceRange:   Optimal          &lt;100   Near Optimal  100-129   Borderline 
  130-159   High          160-189   Very High       &gt;=190   Specimen 
moderately lipemicBASIC METABOLIC [id number] 04:56:00





             Test Item    Value        Reference Range Interpretation Comments

 

             SODIUM (BEAKER) 137 meq/L    136-145                   



             (test code = 381)                                        

 

             POTASSIUM (BEAKER) 4.6 meq/L    3.5-5.1                   Specimen 

moderately



             (test code = 379)                                        hemolyzed

 

             CHLORIDE (BEAKER) 106 meq/L                        



             (test code = 382)                                        

 

             CO2 (BEAKER) (test 20 meq/L     22-29        L            



             code = 355)                                         

 

             BLOOD UREA NITROGEN 12 mg/dL     7-21                      



             (BEAKER) (test code                                        



             = 354)                                              

 

             CREATININE (BEAKER) 0.95 mg/dL   0.57-1.25                 Specimen

 moderately



             (test code = 358)                                        hemolyzed

 

             GLUCOSE RANDOM 398 mg/dL           H            



             (BEAKER) (test code                                        



             = 652)                                              

 

             CALCIUM (BEAKER) 8.8 mg/dL    8.4-10.2                  



             (test code = 697)                                        

 

             EGFR (BEAKER) (test 110 mL/min/1.73                           ESTIM

ATED GFR IS



             code = 1092) sq m                                   NOT AS ACCURATE

 AS



                                                                 CREATININE



                                                                 CLEARANCE IN



                                                                 PREDICTING



                                                                 GLOMERULAR



                                                                 FILTRATION RATE

.



                                                                 ESTIMATED GFR I

S



                                                                 NOT APPLICABLE 

FOR



                                                                 DIALYSIS PATIEN

TS.



POCT-GLUCOSE [id number] 21:53:00





             Test Item    Value        Reference Range Interpretation Comments

 

             POC-GLUCOSE METER > mg/dL             HH           : Notified

 RN/MD: TESTED



             (BEAKER) (test code =                                        AT [facility] [address] [facility]



             1538)                                               [facility], [facility]

30:



                                                                 /Techni

christina ID =



                                                                 [id number] for ZECHARIAH TRACY



U/S, ABDOMINAL, [id number] 19:54:00Reason for exam:-&gt;Evaluation of  
shunt and for possible cyst or pseudocyst Should this be performed at the 
bedside?-&gt;YesFINAL REPORT PATIENT ID:   [id number] Limited abdominal 
ultrasound. CLINICAL HISTORY: Evaluation of VPshunt for possible cyst or 
pseudocyst. COMPARISON STUDY: None available. FINDINGS: Sonographic assessment 
of the abdomen was performed assessing for fluid in the region of the patient's 
shunts. No fluid collections are seen. However, the study is limited by the 
patient's body habitus. CT scan would bemore sensitive. Signed: Brandyn Hendrickson 
MDReport Verified Date/Time:  2020 19:54:10 Reading Location: [facility] C013W
Consult Reading Room      Electronically signed by: BRANDYN HENDRICKSON M.D. on 
2020 07:54 PMPOCT-GLUCOSE [id number] 19:34:00





             Test Item    Value        Reference Range Interpretation Comments

 

             POC-GLUCOSE METER 474 mg/dL           HH           : Notified

 RN/MD:



             (BEAKER) (test code =                                        TESTED

 AT [facility] [address] 7252)                                               [facility],



                                                                 [address]:



                                                                 /Techni

christina ID



                                                                 = [id number] for LONA URIOSTEGUI



URINALYSIS W/ REFLEX URINE [id number] 17:48:00





             Test Item    Value        Reference Range Interpretation Comments

 

             COLOR (BEAKER) (test code = 470) Yellow                            

     

 

             CLARITY (BEAKER) (test code = Hazy                                 

  



             469)                                                

 

             SPECIFIC GRAVITY UA (BEAKER) 1.020        1.001-1.035              

 



             (test code = 468)                                        

 

             PH UA (BEAKER) (test code = 467) 6.0          5.0-8.0              

     

 

             PROTEIN UA (BEAKER) (test code = 20 mg/dL     Negative     A       

     



             464)                                                

 

             GLUCOSE UA (BEAKER) (test code = >1000 mg/dL  Negative     A       

     



             365)                                                

 

             KETONES UA (BEAKER) (test code = 40 mg/dL     Negative     A       

     



             371)                                                

 

             BILIRUBIN UA (BEAKER) (test code Negative     Negative             

     



             = 462)                                              

 

             BLOOD UA (BEAKER) (test code = Moderate     Negative     A         

   



             461)                                                

 

             NITRITE UA (BEAKER) (test code = Positive     Negative     A       

     



             465)                                                

 

             LEUKOCYTE ESTERASE UA (BEAKER) Large        Negative     A         

   



             (test code = 466)                                        

 

             UROBILINOGEN UA (BEAKER) (test 0.2 mg/dL    0.2-1.0                

   



             code = 463)                                         

 

             RBC UA (BEAKER) (test code = 519) 0 /HPF                           

      

 

             WBC UA (BEAKER) (test code = 520) 267 /HPF                         

      

 

             BACTERIA (BEAKER) (test code = Moderate                            

   



             517)                                                

 

             SOURCE(BEAKER) (test code = 2795)                                  

      



CBC W/PLT COUNT &amp; AUTO [id number] 17:38:00





             Test Item    Value        Reference Range Interpretation Comments

 

             WHITE BLOOD CELL COUNT (BEAKER) 7.8 K/ L     3.5-10.5              

    



             (test code = 775)                                        

 

             RED BLOOD CELL COUNT (BEAKER) 5.12 M/ L    4.63-6.08               

  



             (test code = 761)                                        

 

             HEMOGLOBIN (BEAKER) (test code = 14.7 GM/DL   13.7-17.5            

     



             410)                                                

 

             HEMATOCRIT (BEAKER) (test code = 43.3 %       40.1-51.0            

     



             411)                                                

 

             MEAN CORPUSCULAR VOLUME (BEAKER) 84.6 fL      79.0-92.2            

     



             (test code = 753)                                        

 

             MEAN CORPUSCULAR HEMOGLOBIN 28.7 pg      25.7-32.2                 



             (BEAKER) (test code = 751)                                        

 

             MEAN CORPUSCULAR HEMOGLOBIN CONC 33.9 GM/DL   32.3-36.5            

     



             (BEAKER) (test code = 752)                                        

 

             RED CELL DISTRIBUTION WIDTH 15.3 %       11.6-14.4    H            



             (BEAKER) (test code = 412)                                        

 

             PLATELET COUNT (BEAKER) (test 344 K/CU MM  150-450                 

  



             code = 756)                                         

 

             MEAN PLATELET VOLUME (BEAKER) 11.0 fL      9.4-12.4                

  



             (test code = 754)                                        

 

             NUCLEATED RED BLOOD CELLS 0 /100 WBC   0-0                       



             (BEAKER) (test code = 413)                                        



(CELLAVISION MANUAL DIFF)2020 17:38:00





             Test Item    Value        Reference Range Interpretation Comments

 

             NEUTROPHILS - REL 63 %                                   



             (CELLAVISION)(BEAKER) (test code                                   

     



             = 2816)                                             

 

             LYMPHOCYTES - REL 23 %                                   



             (CELLAVISION)(BEAKER) (test code                                   

     



             = 2817)                                             

 

             MONOCYTES - REL 6 %                                    



             (CELLAVISION)(BEAKER) (test code                                   

     



             = 2818)                                             

 

             EOSINOPHILS - REL 5 %                                    



             (CELLAVISION)(BEAKER) (test code                                   

     



             = 2819)                                             

 

             BASOPHILS - REL 1 %                                    



             (CELLAVISION)(BEAKER) (test code                                   

     



             = 2820)                                             

 

             BANDS - REL (CELLAVISION)(BEAKER) 2 %          0-10                

      



             (test code = 2826)                                        

 

             NEUTROPHILS - ABS 4.91 K/ul    1.78-5.38                 



             (CELLAVISION)(BEAKER) (test code                                   

     



             = 2830)                                             

 

             LYMPHOCYTES - ABS 1.79 K/ul    1.32-3.57                 



             (CELLAVISION)(BEAKER) (test code                                   

     



             = 2831)                                             

 

             MONOCYTES - ABS 0.47 K/uL    0.30-0.82                 



             (CELLAVISION)(BEAKER) (test code                                   

     



             = 2832)                                             

 

             EOSINOPHILS - ABS 0.39 K/uL    0.04-0.54                 



             (CELLAVISION)(BEAKER) (test code                                   

     



             = 2834)                                             

 

             BASOPHILS - ABS 0.08 K/uL    0.01-0.08                 



             (CELLAVISION)(BEAKER) (test code                                   

     



             = 2835)                                             

 

             BANDS - ABS (CELLAVISION)(BEAKER) 0.16 K/uL    0.00-0.80           

      



             (test code = 2840)                                        

 

             TOTAL COUNTED (BEAKER) (test code 100                              

      



             = 1351)                                             

 

             SMUDGE CELLS (BEAKER) (test code Present                           

     



             = 1371)                                             

 

             GIANT PLATELETS (BEAKER) (test Present                             

   



             code = 313)                                         

 

             ANISOCYTOSIS (BEAKER) (test code 1+ few                            

     



             = 961)                                              

 

             POIKILOCYTES (BEAKER) (test code 2+ moderate                       

     



             = 966)                                              

 

             SPHEROCYTES (BEAKER) (test code = 1+ few                           

      



             768)                                                

 

             OVALOCYTES (BEAKER) (test code = 1+ few                            

     



             477)                                                

 

             TEAR DROP CELLS (BEAKER) (test 1+ few                              

   



             code = 481)                                         

 

             ARTIFACT (CELLAVISION)(BEAKER) Present                             

   



             (test code = 3432)                                        

 

             PLATELET CONCENTRATION Adequate                               



             (CELLAVISION)(BEAKER) (test code                                   

     



             = 3438)                                             



Received comment: User comments: Slide comments:POCT-GLUCOSE [id number] 
16:27:00





             Test Item    Value        Reference Range Interpretation Comments

 

             POC-GLUCOSE METER 424 mg/dL           HH           : Notified

 RN/MD:



             (KUN) (test code =                                        TESTED

 AT [facility] [address]



             1538)                                               [facility],



                                                                 [address]:



                                                                 /Techni

christina ID



                                                                 = [id number] for AK

VIBHA



                                                                 LONA



CT, BRAIN, WITHOUT [id number] 15:31:00FINAL REPORT PATIENT ID:   
[id number] CT, BRAIN, WITHOUT CONTRAST CLINICAL INDICATION:  Hydrocephalus C
OMPARISON: None TECHNIQUE:  Noncontrast axial CT imaging of the brain and skull.
 DOSE REDUCTION: Dose modulation, iterative reconstruction, and/or weight-based 
adjustment of the mA/kV was utilized to reduce the radiation dose to as low as 
reasonably achievable. FINDINGS:A total of two ventricular drainage catheters 
are present. A right transverse temporal catheter terminates across the midline 
just above the level of the foramen of Monro. A right parietal approach catheter
terminates in the pineal region. Supratentorial ventricular configuration is 
slitlike. There is no herniation. The basilar cisterns are preserved. There is 
no identifiable recent infarct. 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 Calvarial configuration escape of 
cephalic. Thereis no acute osseous abnormality.  IMPRESSION: Chronic, likely 
congenital parenchymal changes withoutrecent infarct or hemorrhage. A total of 
two ventricular drainage catheters are present. Supratentorial ventricular 
configuration is slitlike and may represent over shunting. Correlation 
recommended. Signed: JR Rae Robert MDReport Verified Date/Time:  
2020 15:31:08 Reading Location: [facility] C013 Neuro Reading Room    
Electronically signed by: ALONDRA RAE on 2020 03:31 PMRAD, 
SHUNT [id number] 15:13:00Reason for exam:-&gt;concern for VPS malfunction
FINAL REPORT PATIENT ID:   [id number] RAD, SHUNT SERIES INDICATION: concern for 
VPS malfunction COMPARISON: None TECHNIQUE: AP and lateral radiographs of the 
skull, abdomen and chest were acquired to evaluate shunt catheter FINDINGS:An 
abandoned shunt catheter is present within the right neck. Medial tothis a 
second shunt catheter is present which descends along the left chest wall, 
terminating withinthe mid pelvis. Radiopaque portions of the catheter appear 
contiguous. Signed: Colby Tan Verified Date/Time:  2020 
15:13:54 Reading Location: [facility] Radiology Reading Room  Electronically 
signed by: COLBY TAN MD on 2020 03:13 PMPOCT-GLUCOSE METER
2020 11:38:00





             Test Item    Value        Reference Range Interpretation Comments

 

             POC-GLUCOSE METER 394 mg/dL           H            : TESTED A

[facility]



             (BEAKER) (test code =                                        [place],



             1538)                                               [address]:



                                                                 /Techni

christina ID



                                                                 = [id number] for LONA URIOSTEGUI



HEMOGLOBIN [id number] 09:15:00





             Test Item    Value        Reference Range Interpretation Comments

 

             HEMOGLOBIN A1C (BEAKER) (test code = 10.4 %       4.3-6.1      H   

         



             368)                                                



TSH/FREE T4 IF [id number] 07:54:00





             Test Item    Value        Reference Range Interpretation Comments

 

             THYROID STIMULATING HORMONE 1.40 uIU/mL  0.35-4.94                 



             (BEAKER) (test code = 772)                                        



POCT-GLUCOSE [id number] 07:48:00





             Test Item    Value        Reference Range Interpretation Comments

 

             POC-GLUCOSE METER > mg/dL             HH           : Notified

 RN/MD: TESTED



             (BEAKER) (test code =                                        AT [facility] [address] [facility]



             1530)                                               [facility], [address]

30:



                                                                 /Techni

christina ID =



                                                                 [id number] for LONA GOLDSMITH



BASIC METABOLIC [id number] 07:44:00





             Test Item    Value        Reference Range Interpretation Comments

 

             SODIUM (BEAKER) 134 meq/L    136-145      L            



             (test code = 381)                                        

 

             POTASSIUM (BEAKER) 4.4 meq/L    3.5-5.1                   



             (test code = 379)                                        

 

             CHLORIDE (BEAKER) 103 meq/L                        



             (test code = 382)                                        

 

             CO2 (BEAKER) (test 20 meq/L     22-29        L            



             code = 355)                                         

 

             BLOOD UREA NITROGEN 17 mg/dL     7-21                      



             (BEAKER) (test code                                        



             = 354)                                              

 

             CREATININE (BEAKER) 1.09 mg/dL   0.57-1.25                 



             (test code = 358)                                        

 

             GLUCOSE RANDOM 464 mg/dL           HH           



             (BEAKER) (test code                                        



             = 652)                                              

 

             CALCIUM (BEAKER) 8.6 mg/dL    8.4-10.2                  



             (test code = 697)                                        

 

             EGFR (BEAKER) (test 94 mL/min/1.73                           ESTIMA

MANJEET GFR IS



             code = 1092) sq m                                   NOT AS ACCURATE

 AS



                                                                 CREATININE



                                                                 CLEARANCE IN



                                                                 PREDICTING



                                                                 GLOMERULAR



                                                                 FILTRATION RATE

.



                                                                 ESTIMATED GFR I

S



                                                                 NOT APPLICABLE 

FOR



                                                                 DIALYSIS PATIEN

TS.



LIPID [id number] 07:34:00





             Test Item    Value        Reference Range Interpretation Comments

 

             TRIGLYCERIDES (BEAKER) (test code = 750 mg/dL                      

        



             540)                                                

 

             CHOLESTEROL (BEAKER) (test code = 196 mg/dL                        

      



             631)                                                

 

             HDL CHOLESTEROL (BEAKER) (test code 22 mg/dL                       

        



             = 976)                                              



Calculated LDL not valid if triglyceride &gt;400 mg/dLTriglyceride Reference 
Range:   Low Risk  &lt;150   Borderline    150-199   High Risk     200-499   
Very High Risk  &gt;=500Cholesterol Reference Range:   Low Risk         &lt;200 
 Borderline    200-239    High Risk        &gt;240HDL Cholesterol Reference 
Range:   Low Risk         &gt;=60   High Risk         &lt;40LDL Cholesterol 
ReferenceRange:   Optimal          &lt;100   Near Optimal  100-129   Borderline 
  130-159   High          160-189   Very High       &gt;=190POCT-GLUCOSE METER
2020 00:49:00





             Test Item    Value        Reference Range Interpretation Comments

 

             POC-GLUCOSE METER 399 mg/dL           H            : TESTED A

T [facility] [address]



             (BEX BODY) (test code =                                        [place],



             1538)                                               [address]:



                                                                 /Techni

christina ID



                                                                 = [id number] for CLAY BATRES



RAD, FOOT, 2 VIEWS, [id number] 22:28:00Reason for exam:-&gt;osteomyletitis
FINAL REPORT PATIENT ID:   [id number] TECHNIQUE: Two views each of the bilateral 
feet HISTORY: osteomyletitis. COMPARISON: None. IMPRESSION:No acute displaced 
fracture or dislocation. Joint spaces are within normal limits.Severe soft 
tissue swelling.On the right subcentimeter nonspecific calcifications adjacent 
to the heel plantar aspect. Correlate with physical exam. Severely limited exam 
due to patient body habitus. No definite cortical irregularity.There is clinical
concern for osteomyelitis, recommend MRI with contrast. Signed: Patricia Townsend 
MDRkayeort Verified Date/Time:  2020 22:28:25 Reading Location: [address]
Consult Reading Room  Electronically signed by: PATRICIA TOWNSEND DO on 
2020 10:28 PMRAD, FOOT, 2 VIEWS, [id number] 22:28:00Reason for 
exam:-&gt;osteomyletitsFINAL REPORT PATIENT ID:   [id number] TECHNIQUE: Two views 
each of the bilateral feet HISTORY: osteomyletitis. COMPARISON: None. 
IMPRESSION:No acute displaced fracture or dislocation. Joint spaces are within 
normal limits.Severe soft tissue swelling.On the right subcentimeter nonspecific
calcifications adjacent to the heel plantar aspect. Correlate with physical 
exam. Severely limited exam due to patient body habitus. No definite cortical 
irregularity.There is clinical concern for osteomyelitis, recommend MRI with 
contrast. Signed: Sahani, Patricia MDReport Verified Date/Time:  2020 
22:28:25 Reading Location: [facility] C013W Consult Reading Room  Electronically 
signed by: PATRICIA TOWNSEND DO on 2020 10:28 PMPOCT-GLUCOSE METER
2020 21:04:00





             Test Item    Value        Reference Range Interpretation Comments

 

             POC-GLUCOSE METER 430 mg/dL           HH           : Notified

 RN/MD:



             (UKN) (test code =                                        TESTED

 AT [facility] [address] 4687)                                               [facility],



                                                                 [address]:



                                                                 /Techni

christina ID



                                                                 = [id number] for DEYSI ADRIAN



ERTAPENEM:SUSC:PT:ISOLATE:ORDQN:[id number] 16:48:00Proteus mirabilisMemorial 
HermannURINE AND [id number] 16:48:00Negative (18 10:48 AM)[facility] [id number] 16:48:00Negative *NA*(18 10:48 AM)[facility] IUEAN3599-07-19 16:48:00Negative *NA*(18 10:48 AM)[facility] IJVXP0957-40-05 16:48:00Trace *ABN*(18 10:48 AM)[facility] 
[facility] AEJYI2407-08-48 16:48:000.2[facility]annUR[facility] AND STOOL
2018 16:48:00Large *ABN*(18 10:48 AM)[facility] STOOL
2018 16:48:00Negative (18 10:48 AM)[facility] STOOL
2018 16:48:00Negative (18 10:48 AM)[facility]ann[facility] STOOL
2018 16:48:00





             Test Item    Value        Reference Range Interpretation Comments

 

             UA pH (test code = UA pH) 7.0 1        5.0-8.0                   



[facility]ann[facility] AND [id number] 16:48:00





             Test Item    Value        Reference Range Interpretation Comments

 

             UA Spec Grav (test code = UA Spec 1.015 1                          

      



             Grav)                                               



[facility]ann[facility] AND [id number] 16:48:00Yellow *NA*(18 10:48 AM)
[facility] VYNFN8387-71-98 16:48:00Clear (18 10:48 AM)
[facility] SISLF0054-07-34 16:48:00None Seen (18 10:48 AM)
[facility] VLYFLSH8029-29-34 11:57:00Negative (18 5:57 AM)
[facility]icsBddgcatSBDFNNTTYO4305-14-51 11:57:00





             Test Item    Value        Reference Range Interpretation Comments

 

             PTT (test code = PTT) 33.4 s       22.9-35.8                 



[facility]qFDATOPMMBR5718-78-52 11:57:00





             Test Item    Value        Reference Range Interpretation Comments

 

             PT (test code = PT) 13.7 s       12.0-14.7                 



[facility] ClinicsVxjlkufQMPBBXYPZO0724-93-22 11:57:00





             Test Item    Value        Reference Range Interpretation Comments

 

             INR (test code = INR) 1.05 1       0.85-1.17                 



[facility]CHE[facility] JJZEF6797-50-16 10:50:63814Jgeeczfh HermannCHEM PANEL
2018 10:50:019.4[facility] HermannCHEM YIJSG3513-17-47 10:50:0128Mem[facility] 
HermannCHEM AZXDG7836-81-62 10:50:0114[facility] HermannCHEM UMWAE3667-68-51 
10:50:0189[facility] HermannCHEM KMHPK7095-06-95 10:50:23263Myemtyyv HermannCHEM 
LAVLH7345-28-10 10:50:014.2[facility] HermannCHEM FRLQU8134-06-85 10:50:49114
[facility] HermannCHEM RZQKM9538-07-09 10:50:010.85Mem[facility] HermannCHEM PANEL
2018 10:50:019.2[facility] MmbrzxgMLCOPZDLXM0400-49-95 10:50:01





             Test Item    Value        Reference Range Interpretation Comments

 

             ACT (TEG) Rapid (test code = ACT (TEG) 136 s                 

           



             Rapid)                                              



[facility] Center[facility]hpmahMRWCPQBJAE5103-16-39 10:50:01





             Test Item    Value        Reference Range Interpretation Comments

 

             Split Point Rapid (test code = Split 0.6 min                       

         



             Point Rapid)                                        



[facility] LakeNgjuhdjVBYANUJZDN0978-42-86 10:50:01





             Test Item    Value        Reference Range Interpretation Comments

 

             R-time Rapid (test code = R-time 0.9 min      0.4-0.7              

     



             Rapid)                                              



[facility] LakeLhffbvkZVHWIWEPLA9813-97-78 10:50:01





             Test Item    Value        Reference Range Interpretation Comments

 

             K-time Rapid (test code = K-time 1.4 min      0.6-2.3              

     



             Rapid)                                              



[facility] LakeNbiebfvZDQIBYNWVE2110-94-58 10:50:01





             Test Item    Value        Reference Range Interpretation Comments

 

             Angle Rapid (test code = Angle 71 degrees   64-80                  

   



             Rapid)                                              



[facility] LakeEukcmzcMOPMDZVJUO6356-29-37 10:50:0112.7[facility] HermannHEMATOLOGY
2018 10:50:01





             Test Item    Value        Reference Range Interpretation Comments

 

             Max Amplitude Rapid (test code = Max 72 mm        52-71            

         



             Amplitude Rapid)                                        



[facility] LakeIrqsobtLPBKGIETEK1024-51-70 10:50:010.1[facility] HermannHEMATOLOGY
2018 10:50:46901Uurjwuxs BwedfnrEMAXGSJXFK6990-55-01 10:50:017.8Memorial 
AkogeyaXHJEIQYORV6643-27-66 10:50:01





             Test Item    Value        Reference Range Interpretation Comments

 

             MCH (test code = MCH) 27.4 pg      27.0-31.0                 



[facility] LakePykdosrKEPTOYQSLK8866-81-37 10:50:0180.7[facility] HermannHEMATOLOGY
2018 10:50:0134.0[facility] FvedjjeEXJISOBJHG1084-46-89 10:50:0118.9Memorial
BkxpbwxYPASTGZUTF2696-93-62 10:50:0143.3[facility] JbknswaKRUTSRGKMP5484-60-92 
10:50:019.3[facility] WgwwgziWITGLNGZCB1181-07-54 10:50:0114.7[facility]ell
FZXRKKUQGH2675-13-97 10:50:015.36[facility] GuxwzdjMMOEAGFGVX1981-32-69 10:50:01
0.2[facility] NuuirqnRAOSZHXEAV1421-11-35 10:50:010.1[facility] HermannHEMATOLOGY
2018 10:50:011.8[facility] NimbcbwRACVPXAARA0330-32-15 10:50:010.9Memorial 
BolhkrzBZGHYGIJQJ8198-07-69 10:50:016.3[facility] KjxlrabUTGXAHVGQM6410-20-50 
10:50:012.0[facility] MjxsjhaEAKBVMLLSX8939-44-38 10:50:0167.4[facility] Jose
QBJHWGIFNG0773-92-20 10:50:0119.9[facility] SyjnobcGAAKPLZUJC8770-88-61 10:50:01
1.0Memorial AnlslqrWZJSOOBAZT8385-38-07 10:50:019.7[facility] HermannCHEM PANEL
2018 05:42:00





             Test Item    Value        Reference Range Interpretation Comments

 

             B/C Ratio (test code = B/C Ratio) 17 1         6-25                

      



[facility] HermannCHEM KLOVB8113-21-71 05:42:004.3[facility] HermannCHEM PANEL
2018 05:42:00





             Test Item    Value        Reference Range Interpretation Comments

 

             A/G Ratio (test code = A/G Ratio) 0.7 1        0.7-1.6             

      



[facility] HermannCHEM LATVX9844-18-80 05:42:0014.4[facility] HermannCHEM PANEL
2018 05:42:88621Hrqamdks HermannCHEM OPVQM6753-97-28 05:42:0076Memorial 
HermannCHEM KPRKC5753-92-60 05:42:0035Mem[facility] HermannCHEM FPKNB5452-05-38 
05:42:002.8[facility] HermannCHEM WGGXP6237-33-15 05:42:007.1[facility] HermannCHEM 
KPHLG3667-75-66 05:42:008.7[facility] HermannCHEM RNXOF2306-73-82 05:42:0018
[facility] HermannCHEM AGODB8300-25-42 05:42:000.3[facility] HermannCHEM PANEL
2018 05:42:004.4[facility] HermannCHEM SADHI0239-95-88 05:42:95941Vcgvxjoo 
HermannCHEM WPEBJ7576-10-09 05:42:0023Mem[facility] HermannCHEM JSLLR0507-46-64 
05:42:05921Vpddecij HermannCHEM KOZWF0367-68-48 05:42:001.01[facility] HermannCHEM
BETKQ0758-15-05 05:42:0017Mem[facility] HermannCHEM LIFXH9940-97-00 05:42:71095
[facility] GhszyhxKVCWODAVYL5765-84-49 05:42:000.6[facility] HermannHEMATOLOGY
2018 05:42:004.8[facility] AjstpozEXERXTJQUB2962-77-44 05:42:000.7Memorial 
EmhkpqnYBLTBCNQBB1924-23-82 05:42:001.9[facility] ArdnrncPYTRQYFZNY4018-45-89 
05:42:009.4[facility] LunrgsxPJWZRFPCEU3703-37-53 05:42:000.2[facility]ell
ZBNQJLDBDU8660-83-85 05:42:002.9[facility] XzjrdubNEBXKMTJTG7971-52-11 05:42:00
62.2Memorial LvdzjmzPJPYZTJHSV2938-66-27 05:42:0024.9[facility] HermannHEMATOLOGY
2018 05:42:00





             Test Item    Value        Reference Range Interpretation Comments

 

             MCH (test code = MCH) 27.5 pg      27.0-31.0                 



[facility] AlanzaxSEMWUQFUKY7848-78-64 05:42:0085.3[facility] HermannHEMATOLOGY
2018 05:42:0043.9[facility] DfchrekTDLHQZGGYZ5169-45-80 05:42:0014.2Memorial
AqyblpqNTYZQBWWSM7432-53-30 05:42:007.7[facility] TvtazeoBLODXBKAWU3680-28-52 
05:42:005.15[facility] YkdascpKATUXSCOGB5669-18-11 05:42:008.4[facility]ell
ZCPXXHBQPQ0868-82-16 05:42:0032.3[facility] CejhhnbIEGSDFGUVF1063-56-02 05:42:00
17.3[facility] UonqdjqNJPHGFVOLY8362-51-25 05:42:03072Koodqadg HermannCHEM PANEL
2018 09:36:004.4[facility] HermannCHEM RANUM2657-63-76 09:36:00





             Test Item    Value        Reference Range Interpretation Comments

 

             A/G Ratio (test code = A/G Ratio) 0.6 1        0.7-1.6             

      



[facility] HermannCHEM PPCLQ5291-54-73 09:36:00





             Test Item    Value        Reference Range Interpretation Comments

 

             B/C Ratio (test code = B/C Ratio) 17 1         6-25                

      



[facility] HermannCHEM QVOZZ4375-49-58 09:36:0011.3[facility] HermannCHEM PANEL
2018 09:36:01247Uypoauna HermannCHEM XRHOF4832-94-25 09:36:000.84Mem[facility] 
HermannCHEM LKFSE8173-45-51 09:36:42415Ijvjsclj HermannCHEM OTKCU3700-70-90 
09:36:0099Mem[facility] HermannCHEM GEUTC4749-34-88 09:36:0014Mem[facility] HermannCHEM 
THYPG5044-07-20 09:36:0079Mem[facility] HermannCHEM CIWVY6380-42-32 09:36:000.3
[facility] HermannCHEM EDAJS0632-95-92 09:36:0014Mem[facility] HermannCHEM PANEL
2018 09:36:0043Mem[facility] HermannCHEM XCEPR3133-02-74 09:36:007.1[facility] 
HermannCHEM YWEJN3644-89-11 09:36:002.7[facility] HermannCHEM QSBJS2935-54-72 
09:36:009.2[facility] HermannCHEM DHSEP0541-29-71 09:36:0021Mem[facility] HermannCHEM 
AWFHP8687-86-97 09:36:004.3[facility] HermannCHEM PXEDS3085-12-98 09:36:68775
[facility] MacsxcuRIPLMCXBLQ4716-15-12 09:36:0032.5[facility] HermannHEMATOLOGY
2018 09:36:0017.5[facility] GfuembeTYNBJYHKJX2058-22-76 09:36:63120Wqrldthv 
MpbyjxnLSCLNWAOBY3153-02-18 09:36:008.5[facility] LggkhqcDWRQHKTPHQ6626-31-31 
09:36:006.6[facility] CdgnumjLUQTMGHZTD2354-78-55 09:36:005.17[facility]
JUMIXSZTGB8540-61-19 09:36:0086.1[facility] BqhjiduUWNMILFUWT8524-22-60 09:36:00
44.5[facility] RhwtpmyAZBTZXIALB2914-80-20 09:36:00





             Test Item    Value        Reference Range Interpretation Comments

 

             MCH (test code = MCH) 28.0 pg      27.0-31.0                 



[facility] PmidhgbBSRPXEVQNZ9852-57-98 09:36:0014.5[facility] HermannHEMATOLOGY
2018 09:36:001.7[facility] ItfixfxPQYEVMBZEZ1252-69-49 09:36:000.7[facility] 
WfdacpqDELCFZYYME7576-38-97 09:36:000.2[facility] SrcpltgFPJFLBDGYY3752-62-97 
09:36:0026.1[facility] MwzsqznELNECRODAW9978-84-12 09:36:0059.3[facility]
DQRYBRLQIK3085-41-93 09:36:000.8[facility] YzxysqsEZFMHJBNDY6786-27-69 09:36:00
10.5[facility] OhqogzeIPLIHRHVNP9899-27-25 09:36:003.3[facility] HermannHEMATOLOGY
2018 09:36:003.9[facility] TivemmzDTLJXGSPVL2765-23-11 21:02:009.1[facility] 
HermannCHEM AVDNM5924-23-43 07:07:0074[facility] HermannCHEM BLPPO3456-24-69 
07:07:0012[facility] HermannCHEM DKLND0846-35-50 07:07:000.75[facility] HermannCHEM 
IPOST8737-28-59 07:07:19614Zunehvqz HermannCHEM JVGPD3136-51-98 07:07:002.9
[facility] HermannCHEM XTOTH6037-48-02 07:07:004.4[facility] HermannCHEM PANEL
2018 07:07:00





             Test Item    Value        Reference Range Interpretation Comments

 

             A/G Ratio (test code = A/G Ratio) 0.7 1        0.7-1.6             

      



[facility] HermannCHEM OEHCS8444-20-87 07:07:000.4[facility] HermannCHEM PANEL
2018 07:07:0071Mem[facility] HermannCHEM CKZPM8095-23-60 07:07:0015Mem[facility] 
HermannCHEM NPLYM0744-86-13 07:07:0028Mem[facility] HermannCHEM AQSQX0635-34-90 
07:07:004.4[facility] HermannCHEM BCHEC8810-29-75 07:07:84607Xhaflkuk HermannCHEM 
WCPOP8412-62-28 07:07:0025[facility] HermannCHEM ZWRVH0913-72-63 07:07:51215
[facility] HermannCHEM NAHBM8647-17-05 07:07:00





             Test Item    Value        Reference Range Interpretation Comments

 

             B/C Ratio (test code = B/C Ratio) 16 1         6-25                

      



[facility] HermannCHEM FPLJS7870-17-15 07:07:008.7[facility] HermannCHEM PANEL
2018 07:07:0012.4[facility] HermannCHEM XFWUB1037-34-88 07:07:007.3[facility] 
IaozagbCFMFFIXQCT9826-81-18 07:07:56382Xanwahwa RtchepuEETCXWBMJI0531-26-10 
07:07:008.7[facility] TxwovyiJMQKISLXTH9915-87-94 07:07:006.9[facility] Jose
RHYFGEYQIA4687-30-88 07:07:005.30[facility] EhvemrqLUMSOSKEFE4281-45-71 07:07:00
32.8[facility] RkqsgxiBENOYSGTNG2818-12-86 07:07:00





             Test Item    Value        Reference Range Interpretation Comments

 

             MCH (test code = MCH) 27.9 pg      27.0-31.0                 



[facility] HocgoyaFMHJNOGOVU9459-88-49 07:07:0014.8[facility] HermannHEMATOLOGY
2018 07:07:0085.0[facility] BgfgijgUUJHIKQFVZ9211-01-65 07:07:0045.1[facility]
GxpzfrkFWCIJAQZUP8649-21-05 07:07:0017.5[facility] DdmdehcDZHELBXURB1073-82-81 
07:07:000.2[facility] HpunnrgHGBROWHOZQ3404-97-26 07:07:002.0[facility]
SXKJJYEBTU2302-27-17 07:07:000.7[facility] OddkcpxWDCJVCTFGP4571-56-62 07:07:002.4
[facility] UvmfnldTZAGQYBCZQ0549-65-37 07:07:000.6[facility] HermannHEMATOLOGY
2018 07:07:004.0[facility] EatkxcoHDDODOZXLJ9198-84-79 07:07:0010.4[facility] 
HkxhahmDKQOJNTWLL0102-77-49 07:07:00Normal (18 2:07 AM)[facility]
HGKFHPAEFI2676-87-76 07:07:0058.1[facility] HpgeisyWBHCJFTRXI9093-43-08 07:07:00
Normal (18 2:07 AM)[facility] DdghkmxDDBPIXGHOI1644-53-59 07:07:0028.5[facility]
QuycdyfISEWWOAGVD5119-99-96 16:07:000.1[facility] BgwlyfwFHIFECBWMB0142-65-15 
16:07:00Moderate *ABN*(18 11:07 AM)[facility] KwuvpuzPHGIREUEAH0163-87-54 
06:43:0022.3[facility] HermannCHEM WMAYE9028-17-08 11:45:002.3[facility] HermannCHEM
OZZAK1009-71-98 11:45:003.5[facility] HlcohfbQPCQNSXBQM6135-46-07 11:45:00





             Test Item    Value        Reference Range Interpretation Comments

 

             PT (test code = PT) 14.0 s       12.0-14.7                 



[facility] YtqbokuCINXAJNOGE6201-57-03 11:45:00





             Test Item    Value        Reference Range Interpretation Comments

 

             PTT (test code = PTT) 37.6 s       22.9-35.8                 



[facility] CvlwzmlSEGRAOUFFT2694-21-22 11:45:00





             Test Item    Value        Reference Range Interpretation Comments

 

             INR (test code = INR) 1.08 1       0.85-1.17                 



[facility] ByznqhzRMZSGZKUDB9355-65-90 11:45:0013.1[facility] HermannIMMUNOLOGY
2018 11:45:0025.2[facility] HermannCHEM DCMTJ1830-97-90 03:06:000.9[facility] 
GolquodMEUOHCBLKS6731-78-03 03:06:005[facility] VmlhkrwTVNNMPHXEU9075-61-87 
03:06:0015.8[facility] IdzpgzjSNFDACAZLO5961-82-97 00:44:00





             Test Item    Value        Reference Range Interpretation Comments

 

             PT (test code = PT) 13.0 s       12.0-14.7                 



[facility] FfhjgyrCCRPTMFEZO5393-66-17 00:44:00





             Test Item    Value        Reference Range Interpretation Comments

 

             INR (test code = INR) 0.98 1       0.85-1.17                 



[facility] YmwndgpTBTILVWWTM4482-69-89 00:44:00





             Test Item    Value        Reference Range Interpretation Comments

 

             PTT (test code = PTT) 33.2 s       22.9-35.8                 



[facility] HUIFKBY4423-17-39 23:51:00Negative (5/3/18 6:51 PM)
[facility]annURINE AND SDETF9136-06-35 23:35:000.2[facility]annURINE AND 
VYPGB8296-12-26 23:35:00Negative (5/3/18 6:35 PM)[facility]
2018 23:35:00Negative (5/3/18 6:35 PM)[facility]
2018 23:35:00Negative *NA*(5/3/18 6:35 PM)[facility]
2018 23:35:00Negative *NA*(5/3/18 6:35 PM)[facility]
2018 23:35:00Trace *ABN*(5/3/18 6:35 PM)[facility]
2018 23:35:00Small *ABN*(5/3/18 6:35 PM)[facility]
2018 23:35:00





             Test Item    Value        Reference Range Interpretation Comments

 

             UA Spec Grav (test code = UA Spec 1.020 1                          

      



             Grav)                                               



[facility] [id number] 23:35:00





             Test Item    Value        Reference Range Interpretation Comments

 

             UA pH (test code = UA pH) 6.0 1        5.0-8.0                   



[facility] [id number] 23:35:00Yellow *NA*(5/3/18 6:35 PM)
[facility] [id number] 23:35:00Trace *ABN*(5/3/18 6:35 PM)
[facility] [id number] 23:35:00Slight Cloudy (5/3/18 6:35 PM)
[facility] [id number] 23:35:000-2 (5/3/18 6:35 PM)[facility] 
[id number] 23:20:000.1[facility] [id number] 
23:20:00Normal (5/3/18 6:20 PM)[facility] [id number] 16:22:00





             Test Item    Value        Reference Range Interpretation Comments

 

             CULTURE (BEAKER) (test No growth in 5 days                         

  



             code = 1095)                                        



BLOOD [id number] 16:22:00





             Test Item    Value        Reference Range Interpretation Comments

 

             CULTURE (BEAKER) (test No growth in 5 days                         

  



             code = 1095)                                        



(MANUAL DIFFERENTIAL)2017 22:29:00





             Test Item    Value        Reference Range Interpretation Comments

 

             TOTAL COUNTED (BEAKER) (test code =                                

        



             1351)                                               

 

             WBC MORPHOLOGY (BEAKER) (test code = Normal                        

         



             487)                                                

 

             PLT MORPHOLOGY (BEAKER) (test code = Normal                        

         



             486)                                                

 

             RBC MORPHOLOGY (BEAKER) (test code = Normal                        

         



             762)                                                



CBC W/PLT COUNT &amp; AUTO [id number] 22:28:00





             Test Item    Value        Reference Range Interpretation Comments

 

             WHITE BLOOD CELL COUNT (BEAKER) 7.3 K/ L     4.0-10.0              

    



             (test code = 775)                                        

 

             RED BLOOD CELL COUNT (BEAKER) 5.09 M/ L    4.20-5.80               

  



             (test code = 761)                                        

 

             HEMOGLOBIN (BEAKER) (test code = 13.0 GM/DL   13.0-16.8            

     



             410)                                                

 

             HEMATOCRIT (BEAKER) (test code = 42.3 %       40.0-50.0            

     



             411)                                                

 

             MEAN CORPUSCULAR VOLUME (BEAKER) 83.0 fL      82.0-98.0            

     



             (test code = 753)                                        

 

             MEAN CORPUSCULAR HEMOGLOBIN 25.6 pg      27.0-33.0    L            



             (BEAKER) (test code = 751)                                        

 

             MEAN CORPUSCULAR HEMOGLOBIN CONC 30.8 GM/DL   32.0-36.0    L       

     



             (BEAKER) (test code = 752)                                        

 

             RED CELL DISTRIBUTION WIDTH 18.0 %       10.3-14.2    H            



             (BEAKER) (test code = 412)                                        

 

             PLATELET COUNT (BEAKER) (test 331 K/CU MM  150-430                 

  



             code = 756)                                         

 

             MEAN PLATELET VOLUME (BEAKER) 8.5 fL       6.5-10.5                

  



             (test code = 754)                                        

 

             NUCLEATED RED BLOOD CELLS 0 /100 WBC   0-0                       



             (BEAKER) (test code = 413)                                        

 

             NEUTROPHILS RELATIVE PERCENT 65 %                                  

 



             (BEAKER) (test code = 429)                                        

 

             LYMPHOCYTES RELATIVE PERCENT 23 %                                  

 



             (BEAKER) (test code = 430)                                        

 

             MONOCYTES RELATIVE PERCENT 8 %                                    



             (BEAKER) (test code = 431)                                        

 

             EOSINOPHILS RELATIVE PERCENT 3 %                                   

 



             (BEAKER) (test code = 432)                                        

 

             BASOPHILS RELATIVE PERCENT 1 %                                    



             (BEAKER) (test code = 437)                                        

 

             NEUTROPHILS ABSOLUTE COUNT 4.75 K/ L    1.80-8.00                 



             (BEAKER) (test code = 670)                                        

 

             LYMPHOCYTES ABSOLUTE COUNT 1.68 K/ L    1.48-4.50                 



             (BEAKER) (test code = 414)                                        

 

             MONOCYTES ABSOLUTE COUNT (BEAKER) 0.55 K/ L    0.00-1.30           

      



             (test code = 415)                                        

 

             EOSINOPHILS ABSOLUTE COUNT 0.24 K/ L    0.00-0.50                 



             (BEAKER) (test code = 416)                                        

 

             BASOPHILS ABSOLUTE COUNT (BEAKER) 0.05 K/ L    0.00-0.20           

      



             (test code = 417)                                        



[id number] 11:11:00





             Test Item    Value        Reference Range Interpretation Comments

 

             SODIUM (BEAKER) 138 meq/L    136-145                   



             (test code = 381)                                        

 

             POTASSIUM (BEAKER) 4.0 meq/L    3.5-5.1                   



             (test code = 379)                                        

 

             CHLORIDE (BEAKER) 108 meq/L           H            



             (test code = 382)                                        

 

             CO2 (BEAKER) (test 23 meq/L     22-29                     



             code = 355)                                         

 

             BLOOD UREA NITROGEN 11 mg/dL     7-21                      



             (BEAKER) (test code                                        



             = 354)                                              

 

             CREATININE (BEAKER) 0.74 mg/dL   0.57-1.25                 



             (test code = 358)                                        

 

             GLUCOSE RANDOM 124 mg/dL           H            



             (BEAKER) (test code                                        



             = 652)                                              

 

             CALCIUM (BEAKER) 8.9 mg/dL    8.4-10.2                  



             (test code = 697)                                        

 

             EGFR (BEAKER) (test 150 mL/min/1.73                           ESTIM

ATED GFR IS



             code = 1092) sq m                                   NOT AS ACCURATE

 AS



                                                                 CREATININE



                                                                 CLEARANCE IN



                                                                 PREDICTING



                                                                 GLOMERULAR



                                                                 FILTRATION RATE

.



                                                                 ESTIMATED GFR I

S



                                                                 NOT APPLICABLE 

FOR



                                                                 DIALYSIS PATIEN

TS.



URINE [id number] 09:56:00





             Test Item    Value        Reference Range Interpretation Comments

 

             CULTURE (BEAKER) (test <10,000 col/mL skin                         

  



             code = 1095) jaime                                  



COMPREHENSIVE METABOLIC [id number] 08:49:00





             Test Item    Value        Reference Range Interpretation Comments

 

             TOTAL PROTEIN 7.3 gm/dL    6.0-8.3                   



             (BEAKER) (test code =                                        



             770)                                                

 

             ALBUMIN (BEAKER) 3.3 g/dL     3.5-5.0      L            



             (test code = 1145)                                        

 

             ALKALINE PHOSPHATASE 89 U/L                           



             (BEAKER) (test code =                                        



             346)                                                

 

             BILIRUBIN TOTAL 1.4 mg/dL    0.2-1.2      H            



             (BEAKER) (test code =                                        



             377)                                                

 

             SODIUM (BEAKER) (test 137 meq/L    136-145                   



             code = 381)                                         

 

             POTASSIUM (BEAKER) 3.7 meq/L    3.5-5.1                   



             (test code = 379)                                        

 

             CHLORIDE (BEAKER) 108 meq/L           H            



             (test code = 382)                                        

 

             CO2 (BEAKER) (test 17 meq/L     22-29        L            



             code = 355)                                         

 

             BLOOD UREA NITROGEN 12 mg/dL     7-21                      



             (BEAKER) (test code =                                        



             354)                                                

 

             CREATININE (BEAKER) 0.78 mg/dL   0.57-1.25                 



             (test code = 358)                                        

 

             GLUCOSE RANDOM 119 mg/dL           H            



             (BEAKER) (test code =                                        



             652)                                                

 

             CALCIUM (BEAKER) 8.6 mg/dL    8.4-10.2                  



             (test code = 697)                                        

 

             AST (SGOT) (BEAKER) 13 U/L       5-34                      



             (test code = 353)                                        

 

             ALT (SGPT) (BEAKER) 28 U/L       6-55                      



             (test code = 347)                                        

 

             EGFR (BEAKER) (test 141                                    ESTIMATE

D GFR IS



             code = 1092) mL/min/1.73 sq                           NOT AS ACCURA

TE AS



                          m                                      CREATININE



                                                                 CLEARANCE IN



                                                                 PREDICTING



                                                                 GLOMERULAR



                                                                 FILTRATION RATE

.



                                                                 ESTIMATED GFR I

S



                                                                 NOT APPLICABLE 

FOR



                                                                 DIALYSIS PATIEN

TS.



CBC W/PLT COUNT &amp; AUTO [id number] 08:46:00





             Test Item    Value        Reference Range Interpretation Comments

 

             WHITE BLOOD CELL COUNT (BEAKER) 9.4 K/ L     4.0-10.0              

    



             (test code = 775)                                        

 

             RED BLOOD CELL COUNT (BEAKER) 4.79 M/ L    4.20-5.80               

  



             (test code = 761)                                        

 

             HEMOGLOBIN (BEAKER) (test code = 12.8 GM/DL   13.0-16.8    L       

     



             410)                                                

 

             HEMATOCRIT (BEAKER) (test code = 40.0 %       40.0-50.0            

     



             411)                                                

 

             MEAN CORPUSCULAR VOLUME (BEAKER) 83.4 fL      82.0-98.0            

     



             (test code = 753)                                        

 

             MEAN CORPUSCULAR HEMOGLOBIN 26.7 pg      27.0-33.0    L            



             (BEAKER) (test code = 751)                                        

 

             MEAN CORPUSCULAR HEMOGLOBIN CONC 32.0 GM/DL   32.0-36.0            

     



             (BEAKER) (test code = 752)                                        

 

             RED CELL DISTRIBUTION WIDTH 18.2 %       10.3-14.2    H            



             (BEAKER) (test code = 412)                                        

 

             PLATELET COUNT (BEAKER) (test 313 K/CU MM  150-430                 

  



             code = 756)                                         

 

             MEAN PLATELET VOLUME (BEAKER) 8.6 fL       6.5-10.5                

  



             (test code = 754)                                        

 

             NUCLEATED RED BLOOD CELLS 0 /100 WBC   0-0                       



             (BEAKER) (test code = 413)                                        

 

             NEUTROPHILS RELATIVE PERCENT 70 %                                  

 



             (BEAKER) (test code = 429)                                        

 

             LYMPHOCYTES RELATIVE PERCENT 16 %                                  

 



             (BEAKER) (test code = 430)                                        

 

             MONOCYTES RELATIVE PERCENT 12 %                                   



             (BEAKER) (test code = 431)                                        

 

             EOSINOPHILS RELATIVE PERCENT 1 %                                   

 



             (BEAKER) (test code = 432)                                        

 

             BASOPHILS RELATIVE PERCENT 1 %                                    



             (BEAKER) (test code = 437)                                        

 

             NEUTROPHILS ABSOLUTE COUNT 6.54 K/ L    1.80-8.00                 



             (BEAKER) (test code = 670)                                        

 

             LYMPHOCYTES ABSOLUTE COUNT 1.50 K/ L    1.48-4.50                 



             (BEAKER) (test code = 414)                                        

 

             MONOCYTES ABSOLUTE COUNT (BEAKER) 1.13 K/ L    0.00-1.30           

      



             (test code = 415)                                        

 

             EOSINOPHILS ABSOLUTE COUNT 0.13 K/ L    0.00-0.50                 



             (BEAKER) (test code = 416)                                        

 

             BASOPHILS ABSOLUTE COUNT (BEAKER) 0.06 K/ L    0.00-0.20           

      



             (test code = 417)                                        



0.00URINALYSIS W/ [id number] 20:36:00





             Test Item    Value        Reference Range Interpretation Comments

 

             COLOR (BEAKER) (test code = 470) Yellow                            

     

 

             CLARITY (BEAKER) (test code = 469) Hazy                            

       

 

             SPECIFIC GRAVITY UA (BEAKER) (test 1.012        1.001-1.035        

       



             code = 468)                                         

 

             PH UA (BEAKER) (test code = 467) 5.5          5.0-8.0              

     

 

             PROTEIN UA (BEAKER) (test code = 100 mg/dL    Negative     A       

     



             464)                                                

 

             GLUCOSE UA (BEAKER) (test code = Negative     Negative             

     



             365)                                                

 

             KETONES UA (BEAKER) (test code = Negative     Negative             

     



             371)                                                

 

             BILIRUBIN UA (BEAKER) (test code = Negative     Negative           

       



             462)                                                

 

             BLOOD UA (BEAKER) (test code = 461) Moderate     Negative     A    

        

 

             NITRITE UA (BEAKER) (test code = Negative     Negative             

     



             465)                                                

 

             LEUKOCYTE ESTERASE UA (BEAKER) Large        Negative     A         

   



             (test code = 466)                                        

 

             UROBILINOGEN UA (BEAKER) (test code 3.0 mg/dL    0.2-1.0      H    

        



             = 463)                                              

 

             RBC UA (BEAKER) (test code = 519) 19 /HPF                          

      

 

             WBC UA (BEAKER) (test code = 520) 182 /HPF                         

      

 

             SOURCE(BEAKER) (test code = 2795)                                  

      



BASIC METABOLIC [id number] 17:00:00





             Test Item    Value        Reference Range Interpretation Comments

 

             SODIUM (BEAKER) 140 meq/L    136-145                   



             (test code = 381)                                        

 

             POTASSIUM (BEAKER) 3.7 meq/L    3.5-5.1                   



             (test code = 379)                                        

 

             CHLORIDE (BEAKER) 112 meq/L           H            



             (test code = 382)                                        

 

             CO2 (BEAKER) (test 17 meq/L     22-29        L            



             code = 355)                                         

 

             BLOOD UREA NITROGEN 23 mg/dL     7-21         H            



             (BEAKER) (test code                                        



             = 354)                                              

 

             CREATININE (BEAKER) 1.00 mg/dL   0.57-1.25                 



             (test code = 358)                                        

 

             GLUCOSE RANDOM 102 mg/dL                        



             (BEAKER) (test code                                        



             = 652)                                              

 

             CALCIUM (BEAKER) 8.7 mg/dL    8.4-10.2                  



             (test code = 697)                                        

 

             EGFR (BEAKER) (test 106 mL/min/1.73                           ESTIM

ATED GFR IS



             code = 1092) sq m                                   NOT AS ACCURATE

 AS



                                                                 CREATININE



                                                                 CLEARANCE IN



                                                                 PREDICTING



                                                                 GLOMERULAR



                                                                 FILTRATION RATE

.



                                                                 ESTIMATED GFR I

S



                                                                 NOT APPLICABLE 

FOR



                                                                 DIALYSIS PATIEN

TS.



Specimen slightly ictericCBC W/PLT COUNT &amp; AUTO [id number] 
12:18:00





             Test Item    Value        Reference Range Interpretation Comments

 

             WHITE BLOOD CELL COUNT (BEAKER) 16.4 K/ L    4.0-10.0     H        

    



             (test code = 775)                                        

 

             RED BLOOD CELL COUNT (BEAKER) 5.22 M/ L    4.20-5.80               

  



             (test code = 761)                                        

 

             HEMOGLOBIN (BEAKER) (test code = 14.4 GM/DL   13.0-16.8            

     



             410)                                                

 

             HEMATOCRIT (BEAKER) (test code = 42.9 %       40.0-50.0            

     



             411)                                                

 

             MEAN CORPUSCULAR VOLUME (BEAKER) 82.2 fL      82.0-98.0            

     



             (test code = 753)                                        

 

             MEAN CORPUSCULAR HEMOGLOBIN 27.5 pg      27.0-33.0                 



             (BEAKER) (test code = 751)                                        

 

             MEAN CORPUSCULAR HEMOGLOBIN CONC 33.5 GM/DL   32.0-36.0            

     



             (BEAKER) (test code = 752)                                        

 

             RED CELL DISTRIBUTION WIDTH 16.2 %       10.3-14.2    H            



             (BEAKER) (test code = 412)                                        

 

             PLATELET COUNT (BEAKER) (test 329 K/CU MM  150-430                 

  



             code = 756)                                         

 

             MEAN PLATELET VOLUME (BEAKER) 8.2 fL       6.5-10.5                

  



             (test code = 754)                                        

 

             NUCLEATED RED BLOOD CELLS 0 /100 WBC   0-0                       



             (BEAKER) (test code = 413)                                        

 

             NEUTROPHILS RELATIVE PERCENT 83 %                                  

 



             (BEAKER) (test code = 429)                                        

 

             LYMPHOCYTES RELATIVE PERCENT 7 %                                   

 



             (BEAKER) (test code = 430)                                        

 

             MONOCYTES RELATIVE PERCENT 9 %                                    



             (BEAKER) (test code = 431)                                        

 

             EOSINOPHILS RELATIVE PERCENT 0 %                                   

 



             (BEAKER) (test code = 432)                                        

 

             BASOPHILS RELATIVE PERCENT 0 %                                    



             (BEAKER) (test code = 437)                                        

 

             NEUTROPHILS ABSOLUTE COUNT 1.62 K/ L    1.80-8.00    L            



             (BEAKER) (test code = 670)                                        

 

             LYMPHOCYTES ABSOLUTE COUNT 1.15 K/ L    1.48-4.50    L            



             (BEAKER) (test code = 414)                                        

 

             MONOCYTES ABSOLUTE COUNT (BEAKER) 1.56 K/ L    0.00-1.30    H      

      



             (test code = 415)                                        

 

             EOSINOPHILS ABSOLUTE COUNT 0.01 K/ L    0.00-0.50                 



             (BEAKER) (test code = 416)                                        

 

             BASOPHILS ABSOLUTE COUNT (BEAKER) 0.02 K/ L    0.00-0.20           

      



             (test code = 417)                                        



(MANUAL DIFFERENTIAL)2017 12:18:00





             Test Item    Value        Reference Range Interpretation Comments

 

             TOTAL COUNTED (BEAKER) (test code =                                

        



             1351)                                               

 

             WBC MORPHOLOGY (BEAKER) (test code = Normal                        

         



             487)                                                

 

             PLT MORPHOLOGY (BEAKER) (test code = Normal                        

         



             486)                                                

 

             RBC MORPHOLOGY (BEAKER) (test code = Normal                        

         



             762)

## 2021-01-14 NOTE — XMS REPORT
Clinical Summary

                           Created on:2021



Patient:Redd Dale

Sex:Male

:1985

External Reference #:[id number]





Demographics







                          Address                   [address]



                                                    [place] [address]

 

                          Home Phone                [phone]

 

                          Mobile Phone              [phone]

 

                          Preferred Language        English

 

                          Marital Status            Unknown

 

                          Gnosticist Affiliation     Unknown

 

                          Race                      Black or 

 

                          Ethnic Group              Not  or 









Author







                          Organization              [facility]

 

                          Address                   [address] [place] [address]









Support







                Name            Relationship    Address         Phone

 

                Sabrina Dale   Unavailable     [address] [phone]

[phone]



                                                [place] [address] 









Care Team Providers







                    Name                Role                Phone

 

                    Sharpless           Primary Care Provider [phone]









Allergies







             Active Allergy Reactions    Severity     Noted Date   Comments

 

             Sulfamethoxazole-Trimethoprim Hives        High         2017 

  

 

             Levofloxacin Hives        High         2017   

 

             Morphine     Hives        High         2017   

 

             Sesame Seed  Hives                     2017   

 

             Ketorolac    Rash         High         2017   

 

             Vancomycin Analogues Rash         High         2017   

 

             Ondansetron Hcl (Pf) Nausea And Vomiting              2017   







Medications







          Medication Sig       Dispensed Refills   Start Date End Date  Status

 

          oxyCODONE-acetami Take 1 tablet by           0                        

     Active



          nophen (PERCOCET) mouth every 4 (four)                                

         



           mg per hours as needed for                                     

    



          tablet    Pain.                                             

 

          buPROPion Take 1 tablet (150 mg 30 tablet 0         2020

021 Active



          (WELLBUTRIN XL) total) by mouth                                       

  



          150 MG 24 hr daily.                                            



          tablet                                                      

 

          citalopram Take 1 tablet (40 mg 30 tablet 11        2020

021 Active



          (CELEXA) 40 MG total) by mouth                                        

 



          tablet    daily.                                            

 

          insulin lispro Inject 0-12 Units 10 mL     0         2020 01/15/

2021 Active



          (HUMALOG) 100 subcutaneously 3                                        

 



          unit/mL injection (three) times daily                                 

        



                    before meals.                                         

 

          insulin lispro Inject 25 Units 10 mL     0         2020 01/15/20

21 Active



          (HUMALOG) 100 subcutaneously 3                                        

 



          unit/mL injection (three) times daily                                 

        



                    before meals.                                         

 

          zinc      Apply 5 g topically 2           0         2020        

   Active



          oxide-petrolatum (two) times daily.                                   

      



          (CRITIC-AID)                                                   



          20-51 % Pste                                                   



          topical paste                                                   

 

          meropenem Inject 1 g           0         2020           Active



          (MERREM) MBP 1 gm intravenously every 8                               

          



          in 100 mL NS (eight) hours.                                         

 

          insulin glargine Inject 58 Units 10 mL     0         2020       

    Active



          (LANTUS) 100 subcutaneously 2                                         



          unit/mL injection (two) times daily Use                               

          



                    as directed.                                         

 

          traZODone Take 1 tablet (100 mg           0         2020 02/15/2

020 



          (DESYREL) 100 MG total) by mouth                                      

   



          tablet    nightly for 30 days.                                        

 







Active Problems







                          Problem                   Noted Date

 

                          Hyperglycemia without ketosis 2020

 

                          Spina bifida              2017

 

                          Pyelonephritis            2017







Encounters







             Date         Type         Specialty    Care Team    Description

 

             2020 - Hospital Encounter General Internal Aashishndani,     Hyper

glycemia without 

ketosis;



                2020                      Medicine        MD Isela



                                        Shunt malfunction, sequela;



                                                    Darby,   Type 2 diabetes

 mellitus with hyperglycemia, unspecified whether 

long term insulin use ([facility]);



                                                                MD Salvador



                                        Diabetes mellitus, new onset ([facility]);



                                                    Rafi, Amer, Type 2 diabete

s mellitus treated with insulin ([facility]);



                                                    MD           Proteus infecti

on;



                                                                 ESBL (extended 

spectrum beta-lactamase) producing bacteria infection;



                                                                 Polymicrobial b

acterial infection;



                                                                 Pyelonephritis;



                                                                 Spina bifida of

 sacral region with hydrocephalus ([facility]);



                                                                 Decubitus ulcer

 of ankle, right, unstageable ([facility]);



                                                                 Pressure ulcer 

of right heel, stage 3 ([facility]);



                                                                 Pressure ulcer 

of left heel, stage 2 ([facility]);



                                                                 Eschar of heel



after 2020



Social History







             Tobacco Use  Types        Packs/Day    Years Used   Date

 

             Current Every Day Smoker Cigarettes   0.5                       









                                        Tobacco Cessation: Ready to Quit: No









                          Sex Assigned at Birth     Date Recorded

 

                          Not on file               







Last Filed Vital Signs







                Vital Sign      Reading         Time Taken      Comments

 

                Blood Pressure  136/78          2020 12:58 PM CST 

 

                Pulse           96              2020 12:58 PM CST 

 

                Temperature     35.8 C (96.4 F) 2020 12:58 PM CST 

 

                Respiratory Rate 18              2020 12:58 PM CST 

 

                Oxygen Saturation 96%             2020 12:58 PM CST 

 

                Inhaled Oxygen Concentration -               -               

 

                Weight          -               -               

 

                Height          -               -               

 

                Body Mass Index -               -               







Plan of Treatment







                Health Maintenance Due Date        Last Done       Comments

 

                PNEUMOCOCCAL VACCINE 0-64 YRS (1 1991                     

 



                of 1 - PPSV23)                                  

 

                DIABETIC EYE EXAM 1995                      

 

                DIABETIC FOOT EXAM 1995                      

 

                URINE MICROALBUMIN 1995                      

 

                HEMOGLOBIN A1C  04/10/2020      01/10/2020, 2020, 



                                                2020      

 

                INFLUENZA VACCINE (#1) 2020                      

 

                LIPID PANEL     01/10/2023      01/10/2020, 2020, 



                                                2020, Additional history 



                                                exists          







Procedures







             Procedure Name Priority     Date/Time    Associated Diagnosis Comme

nts

 

             RHYTHM STRIP - SCAN              2020  2:11 PM              



                                       CST                       

 

             RHYTHM STRIP - SCAN              2020  3:32 PM              



                                       CST                       

 

             POCT-GLUCOSE METER Routine      2020 12:52 PM              Re

sults for this



                                       CST                       procedure are i

n



                                                                 the results



                                                                 section.

 

             POCT-GLUCOSE METER Routine      2020  9:03 AM              Re

sults for this



                                       CST                       procedure are i

n



                                                                 the results



                                                                 section.

 

             POCT-GLUCOSE METER Routine      01/15/2020  8:45 PM              Re

sults for this



                                       CST                       procedure are i

n



                                                                 the results



                                                                 section.

 

             POCT-GLUCOSE METER Routine      01/15/2020  4:35 PM              Re

sults for this



                                       CST                       procedure are i

n



                                                                 the results



                                                                 section.

 

             POCT-GLUCOSE METER Routine      01/15/2020 12:08 PM              Re

sults for this



                                       CST                       procedure are i

n



                                                                 the results



                                                                 section.

 

             POCT-GLUCOSE METER Routine      01/15/2020  8:35 AM              Re

sults for this



                                       CST                       procedure are i

n



                                                                 the results



                                                                 section.

 

             POCT-GLUCOSE METER Routine      2020  9:02 PM              Re

sults for this



                                       CST                       procedure are i

n



                                                                 the results



                                                                 section.

 

             MR LOWER EXTREMITY Routine      2020  6:46 PM              Re

sults for this



             WITHOUT IV CONTRAST              CST                       procedur

e are in



             LEFT                                                the results



                                                                 section.

 

             MR LOWER EXTREMITY Routine      2020  6:46 PM              Re

sults for this



             WITHOUT IV CONTRAST              CST                       procedur

e are in



             RIGHT                                               the results



                                                                 section.

 

             POCT-GLUCOSE METER Routine      2020 12:42 PM              Re

sults for this



                                       CST                       procedure are i

n



                                                                 the results



                                                                 section.

 

             POCT-GLUCOSE METER Routine      2020  8:24 AM              Re

sults for this



                                       CST                       procedure are i

n



                                                                 the results



                                                                 section.



after 2020



Results

RHYTHM STRIP - SCAN (2020  2:11 PM CST)Only the most recent of2 results
within the time period is included.





                          Narrative                 Performed At

 

                                        This result has an attachment that is no

t available.



                                        



POC-Glucose meter (2020 12:52 PM CST)Only the most recent of9 results
within the time period is included.





             Component    Value        Ref Range    Performed At Pathologist



                                                                 Signature

 

             POC-Glucose Meter 140 (H)Comment: : 70 - 110 mg/dL [facility] 



                          TESTED AT [facility]   



                          [address],                            



                          [id number]:                                 



                          /[employer]                           



                          an ID = [id number]                           



                          for ANANT DELATORRE                               









                                        Specimen

 

                                        Blood









                Performing Organization Address         City/State/Zipcode Phone

 Number

 

                [facility]  [address]

 [address] 

[phone]



                CENTER                                          



MR lower extremity without IV contrast left side (2020  6:46 PM CST)





                                        Specimen

 

                                        









                          Narrative                 Performed At

 

                                        FINAL REPORT



                                        [id number]



                                         



                                        



                                        PATIENT ID:  [id number]



                                        



                                         



                                        



                                        MRI of the right and left hindfoot witho

ut intravenous contrast



                                        



                                         



                                        



                                        History: Osteomyelitis, diabetic foot



                                        



                                         



                                        



                                        Comparison: Radiograph dated 2020



                                        



                                         



                                        



                                        Technique: Multiplanar multisequence MRI

 of the right and left 



                                        



                                        hindfoot was performed without intraveno

us contrast using the 



                                        



                                        hindfoot osteomyelitis protocol



                                        



                                         



                                        



                                        Findings:



                                        



                                         



                                        



                                        Right foot:



                                        



                                         



                                        



                                        Marked T1 and T2 hypointense soft tissue

 thickening is noted at the 



                                        



                                        medial aspect of the right heel, likely 

to reflect scar tissue. There 



                                        



                                        is also mild subcutaneous edema at the l

ateral aspect of the mid foot 



                                        



                                        and forefoot, incompletely imaged. No di

screte drainable fluid 



                                        



                                        collection is identified.



                                        



                                         



                                        



                                        There is no marrow signal abnormality to

 suggest osteomyelitis. There 



                                        



                                        is a small nonspecific joint effusion at

 the ankle and subtalar 



                                        



                                        joint. Scattered degenerative changes ar

e noted.



                                        



                                         



                                        



                                        There is marked fatty atrophy of the dis

hudson leg and foot musculature. 



                                        



                                        Severe flexor hallucis longus tendinopat

hy. No tenosynovitis.



                                        



                                         



                                        



                                        Left foot:



                                        



                                         



                                        



                                        There is a large area of soft tissue def

ect and granulation tissue at 



                                        



                                        the posteromedial aspect of the heel, re

aching the calcaneus, but 



                                        



                                        there is no drainable fluid collection. 

Deformity is noted in the 



                                        



                                        hindfoot, and the forefoot appears adduc

huma. No acute fracture. No 



                                        



                                        marrow signal abnormality is identified 

to indicate acute 



                                        



                                        osteomyelitis. There is no significant j

oint effusion.



                                        



                                         



                                        



                                        There is severe atrophy of the foot and 

distal leg musculature. No 



                                        



                                        significant tenosynovitis identified.



                                        



                                         



                                        



                                        IMPRESSION:



                                        



                                         



                                        



                                        Granulation/scar tissue at the medial as

pect of the heel bilaterally. 



                                        



                                        No MR evidence of osteomyelitis.



                                        



                                         



                                        



                                        Severe muscle atrophy.



                                        



                                         



                                        



                                        Signed: Jorge Cornejo MD



                                        



                                        Report Verified Date/Time: 01/15/2020 

09:12:01



                                        



                                         



                                        



                                        Reading Location: [facility] C[facility] Ortho Con

sult Reading Room



                                        



                              Electronically signed by: JORGE CORNEJO M.D. 

on 01/15/2020 



                                        09:12 AM



                                        



                                                    









                                        Procedure Note

 

                                        Interface, External Ris In - 01/15/2020 

 9:14 AM CST



FINAL REPORT



                                        



                                        PATIENT ID:   [id number]



                                        



                                        MRI of the right and left hindfoot witho

ut intravenous contrast



                                        



                                        History: Osteomyelitis, diabetic foot



                                        



                                        Comparison: Radiograph dated 2020



                                        



                                        Technique: Multiplanar multisequence MRI

 of the right and left



                                        hindfoot was performed without intraveno

us contrast using the



                                        hindfoot osteomyelitis protocol



                                        



                                        Findings:



                                        



                                        Right foot:



                                        



                                        Marked T1 and T2 hypointense soft tissue

 thickening is noted at the



                                        medial aspect of the right heel, likely 

to reflect scar tissue. There



                                        is also mild subcutaneous edema at the l

ateral aspect of the mid foot



                                        and forefoot, incompletely imaged. No di

screte drainable fluid



                                        collection is identified.



                                        



                                        There is no marrow signal abnormality to

 suggest osteomyelitis. There



                                        is a small nonspecific joint effusion at

 the ankle and subtalar



                                        joint. Scattered degenerative changes ar

e noted.



                                        



                                        There is marked fatty atrophy of the dis

hudson leg and foot musculature.



                                        Severe flexor hallucis longus tendinopat

hy. No tenosynovitis.



                                        



                                        Left foot:



                                        



                                        There is a large area of soft tissue def

ect and granulation tissue at



                                        the posteromedial aspect of the heel, re

aching the calcaneus, but



                                        there is no drainable fluid collection. 

Deformity is noted in the



                                        hindfoot, and the forefoot appears adduc

huma. No acute fracture. No



                                        marrow signal abnormality is identified 

to indicate acute



                                        osteomyelitis. There is no significant j

oint effusion.



                                        



                                        There is severe atrophy of the foot and 

distal leg musculature. No



                                        significant tenosynovitis identified.



                                        



                                        IMPRESSION:



                                        



                                        Granulation/scar tissue at the medial as

pect of the heel bilaterally.



                                        No MR evidence of osteomyelitis.



                                        



                                        Severe muscle atrophy.



                                        



                                        Signed: Jorge Cornejo MD



                                        Report Verified Date/Time:  01/15/2020 0

9:12:01



                                        



                                        Reading Location: [facility] B[facility] C[facility]X Ortho Con

sult Reading Room



                                                Electronically signed by: JORGE CORNEJO M.D. on 01/15/2020 09:12 AM



                                        









                Performing Organization Address         City/State/Zipcode Phone

 Number

 

                [facility]                                          



MR lower extremity without IV contrast right side (2020  6:46 PM CST)





                                        Specimen

 

                                        









                          Narrative                 Performed At

 

                                        FINAL REPORT



                                        [facility]



                                         



                                        



                                        PATIENT ID:  [id number]



                                        



                                         



                                        



                                        MRI of the right and left hindfoot witho

ut intravenous contrast



                                        



                                         



                                        



                                        History: Osteomyelitis, diabetic foot



                                        



                                         



                                        



                                        Comparison: Radiograph dated 2020



                                        



                                         



                                        



                                        Technique: Multiplanar multisequence MRI

 of the right and left 



                                        



                                        hindfoot was performed without intraveno

us contrast using the 



                                        



                                        hindfoot osteomyelitis protocol



                                        



                                         



                                        



                                        Findings:



                                        



                                         



                                        



                                        Right foot:



                                        



                                         



                                        



                                        Marked T1 and T2 hypointense soft tissue

 thickening is noted at the 



                                        



                                        medial aspect of the right heel, likely 

to reflect scar tissue. There 



                                        



                                        is also mild subcutaneous edema at the l

ateral aspect of the mid foot 



                                        



                                        and forefoot, incompletely imaged. No di

screte drainable fluid 



                                        



                                        collection is identified.



                                        



                                         



                                        



                                        There is no marrow signal abnormality to

 suggest osteomyelitis. There 



                                        



                                        is a small nonspecific joint effusion at

 the ankle and subtalar 



                                        



                                        joint. Scattered degenerative changes ar

e noted.



                                        



                                         



                                        



                                        There is marked fatty atrophy of the dis

hudson leg and foot musculature. 



                                        



                                        Severe flexor hallucis longus tendinopat

hy. No tenosynovitis.



                                        



                                         



                                        



                                        Left foot:



                                        



                                         



                                        



                                        There is a large area of soft tissue def

ect and granulation tissue at 



                                        



                                        the posteromedial aspect of the heel, re

aching the calcaneus, but 



                                        



                                        there is no drainable fluid collection. 

Deformity is noted in the 



                                        



                                        hindfoot, and the forefoot appears adduc

huma. No acute fracture. No 



                                        



                                        marrow signal abnormality is identified 

to indicate acute 



                                        



                                        osteomyelitis. There is no significant j

oint effusion.



                                        



                                         



                                        



                                        There is severe atrophy of the foot and 

distal leg musculature. No 



                                        



                                        significant tenosynovitis identified.



                                        



                                         



                                        



                                        IMPRESSION:



                                        



                                         



                                        



                                        Granulation/scar tissue at the medial as

pect of the heel bilaterally. 



                                        



                                        No MR evidence of osteomyelitis.



                                        



                                         



                                        



                                        Severe muscle atrophy.



                                        



                                         



                                        



                                        Signed: Jorge Cornejo MD



                                        



                                        Report Verified Date/Time: 01/15/2020 

09:12:01



                                        



                                         



                                        



                                        Reading Location: [facility] Reading Room



                                        



                              Electronically signed by: JORGE CORNEJO M.D. 

on 01/15/2020 



                                        09:12 AM



                                        



                                                    









                                        Procedure Note

 

                                        Interface, External Ris In - 01/15/2020 

 9:14 AM CST



FINAL REPORT



                                        



                                        PATIENT ID:   [id number]



                                        



                                        MRI of the right and left hindfoot witho

ut intravenous contrast



                                        



                                        History: Osteomyelitis, diabetic foot



                                        



                                        Comparison: Radiograph dated 2020



                                        



                                        Technique: Multiplanar multisequence MRI

 of the right and left



                                        hindfoot was performed without intraveno

us contrast using the



                                        hindfoot osteomyelitis protocol



                                        



                                        Findings:



                                        



                                        Right foot:



                                        



                                        Marked T1 and T2 hypointense soft tissue

 thickening is noted at the



                                        medial aspect of the right heel, likely 

to reflect scar tissue. There



                                        is also mild subcutaneous edema at the l

ateral aspect of the mid foot



                                        and forefoot, incompletely imaged. No di

screte drainable fluid



                                        collection is identified.



                                        



                                        There is no marrow signal abnormality to

 suggest osteomyelitis. There



                                        is a small nonspecific joint effusion at

 the ankle and subtalar



                                        joint. Scattered degenerative changes ar

e noted.



                                        



                                        There is marked fatty atrophy of the dis

hudson leg and foot musculature.



                                        Severe flexor hallucis longus tendinopat

hy. No tenosynovitis.



                                        



                                        Left foot:



                                        



                                        There is a large area of soft tissue def

ect and granulation tissue at



                                        the posteromedial aspect of the heel, re

aching the calcaneus, but



                                        there is no drainable fluid collection. 

Deformity is noted in the



                                        hindfoot, and the forefoot appears adduc

huma. No acute fracture. No



                                        marrow signal abnormality is identified 

to indicate acute



                                        osteomyelitis. There is no significant j

oint effusion.



                                        



                                        There is severe atrophy of the foot and 

distal leg musculature. No



                                        significant tenosynovitis identified.



                                        



                                        IMPRESSION:



                                        



                                        Granulation/scar tissue at the medial as

pect of the heel bilaterally.



                                        No MR evidence of osteomyelitis.



                                        



                                        Severe muscle atrophy.



                                        



                                        Signed: Jorge Cornejo MD



                                        Report Verified Date/Time:  01/15/2020 0

9:12:01



                                        



                                        Reading Location: [facility] Reading Room



                                                Electronically signed by: JORGE CORNEJO M.D. on 01/15/2020 09:12 AM



                                        









                Performing Organization Address         City/State/Zipcode Phone

 Number

 

                [facility]                                          



after 2020



Additional Health Concerns







                Infection       Onset Date      Last Indicated  Resolved Time

 

                MRSA (C)        2020      







Insurance







          Payer     Benefit Plan Subscriber ID Effective Dates Phone     Address

   Type



                    / Group                                           

 

          MEDICAID - [facility] COMM [id number] 2020-[place]



          MEDICAID [employer] STAR PLAN           t                             [facility]                                                        









           Guarantor Name Account Type Relation to Date of    Phone      Billing

 Address



                                 Patient    Birth                 

 

           Redd Dale Personal/Family Self       1985 [phone]



                                                       (Home)     [address]







                                                                  [place], [place]



                                                                  [id number]







Advance Directives

For more information, please contact: [phone]





                Code Status     Date Activated  Date Inactivated Comments

 

                Full Code       2020  9:03 PM 2020  4:44 PM 









                    This code status was determined by: Patient             









                                                                

 

                Full Code       2017  1:36 AM 2017  8:43 PM 









                    This code status was determined by: Patient

## 2021-01-14 NOTE — ER
Nurse's Notes                                                                                     

 [facility]                                                                 

Name: Redd Dale Jr                                                                            

Age: 35 yrs                                                                                       

Sex: Male                                                                                         

: 1985                                                                                   

MRN: [id number]                                                                                   

Arrival Date: 2021                                                                          

Time: 11:27                                                                                       

Account#: [id number]                                                                            

Bed 26                                                                                            

Private MD:                                                                                       

Diagnosis: Leaking Ortega                                                                          

                                                                                                  

Presentation:                                                                                     

                                                                                             

11:48 Chief complaint: Patient states: suprapubic catheter leaking x 2 days. Low back pain    ca1 

      since Tuesday. Coronavirus screen: Client denies travel out of the [place] in the last 14      

      days. At this time, the client does not indicate any symptoms associated with               

      coronavirus-19. Ebola Screen: Patient negative for fever greater than or equal to 101.5     

      degrees Fahrenheit, and additional compatible Ebola Virus Disease symptoms Patient          

      denies exposure to infectious person. Patient denies travel to an Ebola-affected area       

      in the 21 days before illness onset. No symptoms or risks identified at this time.          

      Initial Sepsis Screen: Does the patient meet any 2 criteria? No. Patient's initial          

      sepsis screen is negative. Does the patient have a suspected source of infection? No.       

      Patient's initial sepsis screen is negative. Risk Assessment: Do you want to hurt           

      yourself or someone else? Patient reports no desire to harm self or others. Onset of        

      symptoms was 2021.                                                              

11:48 Method Of Arrival: Wheelchair                                                           ca1 

11:48 Acuity: AYESHA 4                                                                           ca1 

12:12 Acuity: AYESHA 3                                                                           iw  

                                                                                                  

Historical:                                                                                       

- Allergies:                                                                                      

11:54 Amoxicillin;                                                                            ca1 

11:54 Bactrim;                                                                                ca1 

11:54 Ciprofloxacin;                                                                          ca1 

11:54 CLAVULANIC ACID;                                                                        ca1 

11:54 Demerol;                                                                                ca1 

11:54 Doxycycline;                                                                            ca1 

11:54 Levofloxacin;                                                                           ca1 

11:54 Morphine;                                                                               ca1 

11:54 PENICILLINS;                                                                            ca1 

11:54 Toradol;                                                                                ca1 

11:54 TRIMETHOPRIM;                                                                           ca1 

11:54 Vancomycin;                                                                             ca1 

11:54 Zofran;                                                                                 ca1 

- PMHx:                                                                                           

11:54 Asthma; Cerebral Palsy; cluster headaches; decubitus ulcers on feet; GERD;              ca1 

      Hydrocephalus; Hypertension; spina bifida;                                                  

- PSHx:                                                                                           

11:54  shunt; Heel Surgery L; Cholecystectomy;                                              ca1 

                                                                                                  

- Immunization history:: Pneumococcal vaccine is up to date, Flu vaccine is up to date.           

- Social history:: Smoking status: Patient reports the use of cigarette tobacco                   

  products, smokes one-half pack cigarettes per day.                                              

                                                                                                  

                                                                                                  

Screenin:32 Abuse screen: Denies threats or abuse. Nutritional screening: No deficits noted.        jd3 

      Tuberculosis screening: No symptoms or risk factors identified. Fall Risk Ambulatory        

      Aid- None/Bed Rest/Nurse Assist (0 pts). Mental Status- Oriented to own ability (0          

      pts). Total Kim Fall Scale indicates No Risk (0-24 pts).                                  

                                                                                                  

Assessment:                                                                                       

12:31 General: Appears in no apparent distress. comfortable, Behavior is calm, cooperative,   jd3 

      appropriate for age. Pain: Denies pain. Neuro: Level of Consciousness is awake, alert,      

      obeys commands, Oriented to person, place, time, situation. Cardiovascular: Denies          

      chest pain, Capillary refill < 3 seconds Patient's skin is warm and dry. Respiratory:       

      Airway is patent Respiratory effort is even, unlabored, Respiratory pattern is regular,     

      symmetrical, Denies cough, shortness of breath. GI: No signs and/or symptoms were           

      reported involving the gastrointestinal system. : Reports leaking of suprapubic           

      catheter. EENT: No signs and/or symptoms were reported regarding the EENT system. Derm:     

      Skin is intact, Skin is dry, Skin is normal, Skin temperature is warm.                      

13:02 Reassessment: Patient appears in no apparent distress at this time. Patient and/or      jd3 

      family updated on plan of care and expected duration. Pain level reassessed. Patient is     

      alert, oriented x 3, equal unlabored respirations, skin warm/dry/pink. awaiting             

      suprapubic cath from materials.                                                             

14:11 Reassessment: Patient appears in no apparent distress at this time. Patient and/or      jd3 

      family updated on plan of care and expected duration. Pain level reassessed. Patient is     

      alert, oriented x 3, equal unlabored respirations, skin warm/dry/pink. pt discharged to     

      front of ER. pt denies pain or discomfort at this time.                                     

                                                                                                  

Vital Signs:                                                                                      

11:48  / 81; Pulse 93; Resp 16 S; Temp 98.6(O); Pulse Ox 97% on R/A; Weight 172.37 kg   ca1 

      (R); Height 4 ft. 11 in. (149.86 cm) (R); Pain 6/10;                                        

14:11  / 80; Pulse 89; Resp 16 S; Pulse Ox 97% on R/A;                                  jd3 

11:48 Body Mass Index 76.75 (172.37 kg, 149.86 cm)                                            ca1 

                                                                                                  

ED Course:                                                                                        

11:27 Patient arrived in ED.                                                                  ag5 

11:52 Triage completed.                                                                       ca1 

11:54 Arm band placed on right wrist.                                                         ca1 

12:27 Juan Luis Lerner MD is Attending Physician.                                              kdr 

12:29 Cristopher Bob, RN is Primary Nurse.                                                  jd3 

12:33 Patient has correct armband on for positive identification. Bed in low position. Call   jd3 

      light in reach. Pulse ox on. NIBP on.                                                       

14:12 No provider procedures requiring assistance completed. Patient did not have IV access   jd3 

      during this emergency room visit.                                                           

                                                                                                  

Administered Medications:                                                                         

No medications were administered                                                                  

                                                                                                  

                                                                                                  

Outcome:                                                                                          

14:01 Discharge ordered by MD.                                                                kdr 

14:12 Discharged to home with family.                                                         jd3 

14:12 Condition: stable                                                                           

14:12 Discharge instructions given to patient, Instructed on discharge instructions, follow       

      up and referral plans. Demonstrated understanding of instructions, follow-up care.          

14:12 Patient left the ED.                                                                    jd3 

                                                                                                  

Signatures:                                                                                       

Juan Luis Lerner MD MD   kdr                                                  

Renetta Terry RN RN                                                      

Cristopher Bob, YADIEL                    RN   jDai Parker RN                        RN   ca1                                                  

Emerson Kumar                                ag5                                                  

                                                                                                  

**************************************************************************************************

## 2021-01-14 NOTE — XMS REPORT
Continuity of Care Document

                           Created on:2021



Patient:JORDAN VERDIN JR

Sex:Male

:1985

External Reference #:[id number]





Demographics







                          Address                   [address] [place] [id number]

 

                          Home Phone                [phone]

 

                          Phone                     [phone]

 

                          Preferred Language        en-US

 

                          Marital Status            Unknown

 

                          Caodaism Affiliation     Unknown

 

                          Race                      Unknown

 

                          Ethnic Group              Unknown









Author







                          Organization              [employer]









Care Team Providers







                    Name                Role                Phone

 

                    [employer] Unavailable         Un

available









Problems







          Problem   Status    Onset     Classification Date      Comments  Sourc

e



                              Date                Reported            



                                                                      



                                                                      



                                                                      

 

          Headache            20           [address]



                                                                      



                                                                      

 

          HEADACHE  Active    20                                [address]



                                                                      



                                                                      

 

          SHUNT MALFUNCTION Active    20                                [address]



                                                                      



                                                                      

 

          ACUTE HEADACHE Active    20                                [address]



                                                                      



                                                                      

 

          Acute pain Active              Problem   2019           [facility]



          (finding)                                                   Neuro,[facility]



                                                                      



                                                                      

 

          Asthma (disorder) Resolved            Problem   2019           [facility]

[facility]



                                                                      Neuro,[facility]



                                                                      



                                                                      

 

          Bronchitis Resolved            Problem   2019           [facility]



          (disorder)                                                   Neuro,[facility]



                                                                      



                                                                      

 

          Cerebral palsy Resolved            Problem   2019           [facility]

her



          (disorder)                                                   Neuro,[facility]



                                                                      



                                                                      

 

          Headache  Active              Problem   2019           [facility]



          (finding)                                                   Neuro,[facility]



                                                                      



                                                                      

 

          Hydrocephalus Resolved            Problem   2019           [facility]

er



          (disorder)                                                   Neuro,[facility]



                                                                      



                                                                      

 

          Osteomyelitis Resolved            Problem   2019           [facility]

er



          (disorder)                                                   Neuro,[facility]



                                                                      



                                                                      

 

          Spina bifida,                               2019           [facility]



                                                                      



                                                                      

 

          Nausea with                               2019            [place]

s



          vomiting,                                                   Medical



          unspecified                                                   Center



                                                                      



                                                                      

 

          Diplopia                                2019           [facility]



                                                                      



                                                                      

 

          Cerebral palsy,                               2019           [facility]



                                                                      



                                                                      

 

          Acquired absence                               2019           [facility]



          of other                                                    Medical



          specified parts                                                   Cent

er



          of digestive                                                   



          tract                                                       



                                                                      

 

          Nicotine                                2019           [facility]



          dependence,                                                   Medical



          cigarettes,                                                   Center



          uncomplicated                                                   



                                                                      



                                                                      

 

          Presence of                               2019            [facility]

s



          cerebrospinal                                                   Medica

l



          fluid drainage                                                   [facility]

r



          device                                                      



                                                                      

 

          Allergy status to                               2019           [facility] [place]



          other antibiotic                                                   Med

ical



          agents status                                                   Center



                                                                      



                                                                      

 

          Allergy status to                               2019           [facility] [place]



          other drugs,                                                   Medical



          medicaments and                                                   Cent

er



          biological                                                   



          substances status                                                   



                                                                      



                                                                      

 

          Allergy status to                               2019           [facility] [place]



          narcotic agent                                                   Medic

al



          status                                                      Center



                                                                      



                                                                      

 

          HEADACHE  Active                                            [facility]



                                                                      



                                                                      







Medications







        Medication Details Route   Status  Patient Ordering Order   Source



                                        Instructions Provider Date    



                                                                



                                                                



                                                                

 

        normal saline 1,000 mL, Rate:         Inactive                 

[facility] [place]



        0.9% IV 1,000 100 ml/hr,                                 2018    Medical



        mL      Infuse over: 10                                         Center



                hr, Route: IV,                                         



                Dosing Weight                                         



                131.818 kg,                                         



                Total Volume:                                         



                1,000, Start                                         



                date: 18                                         



                5:04:00 CST,                                         



                Duration: 30                                         



                day, Stop date:                                         



                18                                         



                5:03:00 CST,                                         



                2.4, m2                                         



                                                                

 

        Magnesium Notes: WASTE:         Inactive                  [facility]

s



        Sulfate F/P - Sink; E -                                 2018    Medical



                Municipal Trash                                         [place]



                Bin                                             



                                                                

 

        Isolyte S Notes: (Same         Inactive                  [place]



        PH-7.4 (Bolus) as: [facility] S                                     Med

ical



        IV      PH 7.4)                                         Center



                                                                



                                                                

 

        Phenergan 12.5 mg, 0.5         Inactive                  [place]



                mL, Route:                                 2018    Medical



                IVPB, Drug                                         Center



                form: INJ,                                         



                ONCE, Dosing                                         



                Weight 131.818,                                         



                kg, Priority:                                         



                STAT, Start                                         



                date: 18                                         



                4:35:00 CST,                                         



                Stop date:                                         



                18                                         



                4:35:00 CST                                         



                                                                



                                                                

 

        Docusate Sodium 100 mg = 1 cap,         Active                    

 [place]



        100 MG Oral PO, BID, 0                                 2018    Medical



        Capsule Refill(s)                                         [place]



                                                                



                                                                

 

        Zosyn   0 Refill(s)         Active                   [place]



                                                        2018    Medical



                                                                [place]



                                                                



                                                                

 

        celecoxib 200 200 mg = 1 cap,         Active                    

 [place]



        mg oral capsule PO, BID, 0                                 2018    Medic

al



                Refill(s)                                         [place]



                                                                



                                                                

 

        ascorbic acid 500 mg = 1 tab,         Active                    [facility]



                PO, BID, 0                                 2018    Medical



                Refill(s)                                         [place]



                                                                



                                                                

 

        acetaminophen 1,000 mg = 2         Active                     [facility]

[place]



        500 mg oral tab, PO,                                 2018    Medical



        tablet  Q6Hnow, 0                                         Center



                Refill(s)                                         



                                                                



                                                                

 

        Oxycodone 5 mg = 1 tab,         Active                    [facility] [place]



        Hydrochloride 5 PO, Q4H, PRN                                 2018    Med

ical



        MG Oral Tablet Pain Score 4-6,                                         C

enter



                0 Refill(s)                                         



                                                                



                                                                

 

        zinc sulfate 220 mg = 1 cap,         Active                     

[place]



        220 mg oral PO, Daily, 0                                 2018    Medical



        capsule Refill(s)                                         [place]



                                                                



                                                                

 

        multivitamin 1 tab, PO,         Active                    [facility]



                Daily, 0                                 2018    Medical



                Refill(s)                                         Center



                                                                



                                                                

 

        methocarbamol 1,000 mg = 2         Active                     [facility]

[place]



        500 mg oral tab, PO, Q8H, 0                                 2018    Medi

[facility]



        tablet  Refill(s)                                         Center



                                                                



                                                                

 

        LORazepam 0.5 0.5 mg = 1 tab,         Active                    

 [place]



        mg oral tablet PO, Q8H, PRN                                 2018    Medi

[facility]



                Anxiety, 0                                         Center



                Refill(s)                                         



                                                                



                                                                

 

        Lidocaine 3 patch, TOP,         Active                     [place]



        Hydrochloride Daily, Remove                                 2018    Medi

[facility]



        0.05 MG/MG after 12 hours,                                         [facility]

r



        Transdermal 0 Refill(s)                                         



        Patch                                                   



        [Lidoderm]                                                 



                                                                



                                                                

 

        Robaxin Notes: (Same         No Longer                    [place]



                as:Robaxin)         Active                  2018    Medical



                                                                Center



                                                                



                                                                

 

        Oxycodone Notes: (Same         No Longer                    [place]

s



        Hydrochloride 5 as: Roxicodone)         Active                  2018    

Medical



        MG Oral Tablet                                                 Center



                                                                



                                                                

 

        Ativan  Notes: (Same         No Longer                    [place]



                as: Ativan)         Active                  2018    Medical



                                                                Center



                                                                



                                                                

 

        Trazodone Notes: (Same         No Longer                    [place]

s



        Hydrochloride As: Desyrel)         Active                  2018    Medic

al



        50 MG Oral                                                 Center



        Tablet                                                  



                                                                

 

        remove patch Notes: Remove         No Longer                    

[place]



                patch 12 hours         Active                  2018    Medical



                after                                           Center



                application                                         



                each day.                                         



                                                                



                                                                

 

        Oxycodone Notes: (Same         Inactive                    [place]



        Hydrochloride 5 as: Roxicodone)                                 2018    

Medical



        MG Oral Tablet                                                 Center



                                                                



                                                                

 

        Celebrex Notes: NSAID.         No Longer                    [place]

s



                Please check         Active                  2018    Medical



                indication. Not                                         Center



                for seizure.                                         



                (Same As:                                         



                CeleBREX)                                         



                                                                



                                                                

 

        Vancomycin  2001 mg:         No Longer                    [place]



                infuse over 2.5         Active                  2018    Medical



                hours                                           Center



                                                                



                                                                

 

        Lidocaine Notes: Apply         No Longer                    [place]

s



        Hydrochloride only once for         Active                  2018    Medi

[facility]



        0.05 MG/MG up to 12 hours                                         Center



        Transdermal in a 24-hour                                         



        Patch   period (12                                         



        [Lidoderm] hours on and 12                                         



                hours off).                                         



                (Same as:                                         



                Lidoderm)                                         



                "Remove old                                         



                patch before                                         



                application of                                         



                new patch"                                         



                                                                



                                                                

 

        Phenergan Notes: Do not         Inactive                    [place]

s



                give IV push.                                 2018    Medical



                (Same as:                                         Center



                Phenergan)                                         



                                                                



                                                                

 

        Dilaudid Notes: Same as         Inactive                    [place]

s



                Dilaudid                                 2018    Medical



                                                                Center



                                                                



                                                                

 

        Tramadol Notes: Not to         No Longer                    [place]

s



                exceed          Active                  2018    Medical



                400mg/day.                                         Center



                (Same As:                                         



                Ultram)                                         



                                                                

 

        gabapentin Notes: (Same         No Longer                    [facility]

as



                as: Neurontin)         Active                  2018    Medical



                                                                Center



                                                                



                                                                

 

        Acetaminophen Notes: Max         No Longer                    [facility]

[place]



                acetaminophen         Active                  2018    Medical



                4000 mg/day (4                                         Center



                gm/day).  (Same                                         



                as: Tylenol                                         



                Extra Strength)                                         



                                                                



                                                                

 

        Robaxin Notes: (Same         No Longer                    [place]



                as:Robaxin)         Active                  2018    Medical



                                                                Center



                                                                



                                                                

 

        Oxycodone Notes: (Same         No Longer                    [place]

s



        Hydrochloride 5 as: Roxicodone)         Active                  2018    

Medical



        MG Oral Tablet                                                 Center



                                                                



                                                                

 

        Beneprotein 7 Notes: (Same         No Longer                   [facility]



        gm pkt  as:             Active                      Medical



                Beneprotein)                                         [place]



                                                                



                                                                

 

        Acetaminophen 1 tab, PO, TID,         No Longer                   

 [place]



        325 MG / 0 Refill(s)         Active                  2018    Medical



        Oxycodone                                                 [place]



        Hydrochloride                                                 



        10 MG Oral                                                 



        Tablet                                                  



        [Percocet                                                 



        10/325]                                                 



                                                                

 

        Dilaudid 0.5 mg, 0.25         Inactive                    [place]



                mL, Route: IVP,                                     Medical



                Drug form: INJ,                                         Center



                ONCE, Start                                         



                date: 18                                         



                11:01:00 CDT,                                         



                Stop date:                                         



                18                                         



                11:01:00 CDT                                         



                                                                



                                                                

 

        Phenergan Notes: (Same         Inactive                   [facility]



                as: Phenergan)                                 2018    Medical



                                                                Center



                                                                



                                                                

 

        Dilaudid 2 mg, Route:         Inactive                    [place]



                IVP, ONCE,                                 2018    Medical



                Dosing Weight                                         Center



                127.027, kg,                                         



                Priority: STAT,                                         



                Start date:                                         



                18                                         



                10:43:00 CDT,                                         



                Stop date:                                         



                18                                         



                10:43:00 CDT                                         



                                                                



                                                                

 

        Docusate Notes: (Same         No Longer                    [place]



                as: Colace) (Do         Active                      Medical



                Not Crush)                                         Center



                                                                



                                                                

 

        Zinc Sulfate Notes: (Zinc         No Longer                    [facility]

[place]



                sulfate         Active                      Medical



                capsule) - 220                                         Center



                mg Zinc sulfate                                         



                = 50 mg                                         



                elemental zinc                                         



                Same as Zinc                                         



                Sulfate                                         



                                                                

 

        ascorbic acid Notes: (Same         No Longer                    

[place]



                as: Vitamin C)         Active                  2018    Medical



                                                                Center



                                                                



                                                                

 

        multivitamin Notes: (Same         No Longer                    [facility]

[place]



                as:Thera)         Active                  2018    Medical



                WASTE: F/P -                                         Center



                Black; E -                                         



                Municipal Trash                                         



                Bin  Take with                                         



                food.                                           



                                                                

 

        Naproxen Notes: (Same         Inactive                    [place]



                as: Naprosyn)                                 2018    Medical



                Take with food.                                         Center



                                                                



                                                                

 

        Zosyn   Notes: (Same         No Longer                    [place]



                as: Zosyn)         Active                  2018    Medical



                Dosing based on                                         Center



                Piperacillin                                         



                component   ***                                         



                MEDICATION                                         



                WASTE ***                                         



                Product Size:                                         



                3375 mg Product                                         



                Wasted:  ___ mg                                         



                                                                



                                                                

 

        Vancomycin  2001 mg:         No Longer                    [place]



                infuse over 2.5         Active                  2018    Medical



                hours  For                                         [place]



                adult patients                                         



                only: Round to                                         



                nearest 250 mg                                         



                per Medical                                         



                Staff approval                                         



                 *** MEDICATION                                         



                WASTE ***                                         



                Product Size:                                         



                1000 mg Product                                         



                Wasted:  ___ mg                                         



                                                                



                                                                

 

        Enoxaparin Notes: (Same         No Longer                    [facility]

as



                as: Lovenox)         Active                  2018    [facility]



                                                                



                                                                

 

        Sodium Chloride 1,000 mL, Rate:         No Longer                 

   [place]



        0.9% IV 1,000 125 ml/hr,         Active                  2018    Medical



        mL      Infuse over: 8                                         Center



                hr, Route: IV,                                         



                Dosing Weight                                         



                127.27 kg,                                         



                Total Volume:                                         



                1,000, Start                                         



                date: 18                                         



                1:46:00 CDT,                                         



                Duration: 30                                         



                day, Stop date:                                         



                18                                         



                1:45:00 CDT,                                         



                2.44, m2                                         



                                                                

 

        Saline Flush Notes: (Same         No Longer                    [facility]

[place]



        0.9%    as: BD          Active                  2018    Medical



                Posiflush)                                         Center



                                                                



                                                                

 

        Acetaminophen Notes: Do not         Inactive                    

[place]



                exceed 4                                 2018    Medical



                gm/day.  (Same                                         Center



                as: Tylenol)                                         



                                                                



                                                                

 

        Acetaminophen Notes: (Same         Inactive                    [facility]

[place]



        325 MG / as: [place]                                 2018    Medical



        Hydrocodone 325/5)  Do not                                         [facility]

r



        Bitartrate 5 MG exceed 4gm/day                                         



        Oral Tablet of                                              



                acetaminophen.                                         



                                                                



                                                                

 

        Reglan  Notes: (Same         Inactive                    [place]



                as: Reglan)                                 2018    Medical



                                                                Center



                                                                



                                                                

 

        Benadryl Notes: (Same         Inactive                    [place]



                as: Benadryl)                                 2018    [facility]



                                                                



                                                                

 

        Magnesium Notes: WASTE:         Inactive                    [place]

s



        Sulfate F/P - Sink; E -                                 2018    Medical



                Municipal Trash                                         Center



                Bin                                             



                                                                

 

        Sodium Chloride 1,000 mL, 1000         Inactive                   

 [place]



        0.9% (Bolus) IV ml/hr, Infuse                                 2018    Me

dical



                Over: 1 hr,                                         Center



                Route: IV,                                         



                1,000, Drug                                         



                form: INJ,                                         



                ONCE, Priority:                                         



                STAT, Dosing                                         



                Weight 127.273                                         



                kg, Start date:                                         



                18                                         



                23:26:00 CDT,                                         



                Stop date:                                         



                18                                         



                23:26:00 CDT                                         



                                                                



                                                                

 

        Zosyn   4.5 gm, Route:         Inactive                 [facility]



                IVPB, ONCE,                                 2018    Medical



                Dosing Weight                                         Center



                127.273, kg,                                         



                Priority: STAT,                                         



                Start date:                                         



                18                                         



                23:10:00 CDT,                                         



                Stop date:                                         



                18                                         



                23:10:00 CDT,                                         



                ABX Indication:                                         



                Bacteremia                                         



                                                                



                                                                

 

        Vancomycin  2001 mg:         Inactive                   [facility]



                infuse over 2.5                                 2018    Medical



                hours  For                                         Center



                adult patients                                         



                only: Round to                                         



                nearest 250 mg                                         



                per Medical                                         



                Staff approval                                         



                 *** MEDICATION                                         



                WASTE ***                                         



                Product Size:                                         



                1000 mg Product                                         



                Wasted:  ___ mg                                         



                                                                



                                                                

 

        normal saline 1,000 mL, Rate:         No Longer                   

[facility] [place]



        0.9% IV 1,000 75 ml/hr,         Active                  2018    Medical



        mL      Infuse over:                                         Center



                13.3 hr, Route:                                         



                IV, Dosing                                         



                Weight 127.273                                         



                kg, Total                                         



                Volume: 1,000,                                         



                Start date:                                         



                18                                         



                22:39:00 CDT,                                         



                Duration: 30                                         



                day, Stop date:                                         



                18                                         



                22:38:00 CDT,                                         



                2.36, m2                                         



                                                                

 

        Acetaminophen Notes: Max         Inactive                 [facility]

as



                acetaminophen                                 2018    Medical



                4000 mg/day (4                                         Center



                gm/day).  (Same                                         



                as: Tylenol                                         



                Extra Strength)                                         



                                                                



                                                                

 

        Rocephin Notes: (Same         Inactive                 [facility]



                As: Rocephin).                                 2018    Medical



                 *** MEDICATION                                         Center



                WASTE ***                                         



                Product Size:                                         



                1000 mg Product                                         



                Wasted:  _0__                                         



                mg                                              



                                                                

 

        Morphine 4 mg, Route:         Inactive                 [facility]



                IVP, ONCE,                                 2018    Medical



                Dosing Weight                                         Center



                127.273, kg,                                         



                Priority: STAT,                                         



                Start date:                                         



                18                                         



                19:05:00 CDT,                                         



                Stop date:                                         



                18                                         



                19:05:00 CDT                                         



                                                                



                                                                

 

        Benadryl Notes: (Same         Inactive                 [facility]



                as: Benadryl)                                 2018    Medical



                                                                Center



                                                                



                                                                

 

        Benadryl Notes: (Same         Inactive                 [facility]



                as: Benadryl)                                 2018    Medical



                                                                Center



                                                                



                                                                

 

        Morphine 4 mg, 1 mL,         Inactive                 /  [facility] [place]



                Route: IVP,                                 2018    Medical



                Drug form:                                         Center



                SOLN, ONCE,                                         



                Dosing Weight                                         



                127.273, kg,                                         



                Priority: STAT,                                         



                Start date:                                         



                18                                         



                17:56:00 CDT,                                         



                Stop date:                                         



                18                                         



                17:56:00 CDT                                         



                                                                



                                                                







Allergies, Adverse Reactions, Alerts







        Substance Category Reaction Severity Reaction Status  Date    Comments S

ource



                                        type            Reported         



                                                                        



                                                                        



                                                                        

 

        amoxicillin Assertion                 Drug    Active                  [facility]

[facility]



                                        allergy                         [facility]



                                                                        



                                                                        



                                                                        

 

        morphine Assertion                 Drug    Active                  [facility]

er



                                        allergy                         [facility]



                                                                        



                                                                        



                                                                        

 

        Toradol Assertion                 Drug    Active                  [facility]

r



                                        allergy                         [facility]



                                                                        



                                                                        



                                                                        

 

        Minocin Assertion                 Drug    Active                  [facility]

r



                                        allergy                         [facility]



                                                                        



                                                                        



                                                                        

 

        Zofran  Assertion                 Drug    Active                  [facility]

r



                                        allergy                         [facility]



                                                                        



                                                                        



                                                                        

 

        Levaquin Assertion                 Drug    Active                  [facility]

er



                                        allergy                         Neuro



                                                                        



                                                                        



                                                                        

 

        Bactrim Assertion                 Drug    Active                  [facility]

r



                                        allergy                         Neuro



                                                                        



                                                                        



                                                                        







Immunizations







                                        No Data Provided for This Section







Results







        Order Name Results Value   Reference Date    Interpretation Comments Y[facility]

rce



                                Range                           



                                                                



                                                                



                                                                

 

        ERTAPENEM:S Culture: >100,000 CFU/mL Proteus mirabilis             

               [facility]:PT:ISOL Urine   10,000 -  50,000 CFU/mL Skin Mini         /    

               Medical



        ATE:ORDQN:M                                                 [place]



        IC                                                      



                                                                



                                                                

 

        ERTAPENEM:S Proteus Proteus                            [facility]:PT:ISOL mirabilis mirabilis         /                   Medical



        ATE:ORDQN:M                                                 Center



        IC                                                      



                                                                



                                                                

 

        URINE AND UA Nitrite Negative Negative                    [facility]



        STOOL           (18 10:48 AM)         /                   Medic

al



                                                                Center



                                                                



                                                                



                                                                

 

        URINE AND UA Bili Negative Negative                    [facility]



        STOOL           *NA*            /                   Medical



                        (18 10:48 AM)                                 [facility]

r



                                                                



                                                                



                                                                

 

        URINE AND UA Ketones Negative Negative                    [facility]



        STOOL           *NA*            /                   Medical



                        (18 10:48 AM)                                 [facility]

r



                                                                



                                                                



                                                                

 

        URINE AND UA Blood Trace   Negative                    [facility]



        STOOL           *ABN*           /                   Medical



                        (18 10:48 AM)                                 [facility]

r



                                                                



                                                                



                                                                

 

        URINE AND UA      0.2     0.1 - 1.0                    [facility]



        STOOL   Urobilinogen                 /                   Medical



                                                                Center



                                                                



                                                                



                                                                

 

        URINE AND UA Leuk Est Large   Negative                    [facility]



        STOOL           *ABN*           /                   Medical



                        (18 10:48 AM)                                 [place]

r



                                                                



                                                                



                                                                

 

        URINE AND UA Protein Negative Negative                    [facility]



        STOOL           (18 10:48 AM)         /                   Medic

al



                                                                Center



                                                                



                                                                



                                                                

 

        URINE AND UA Glucose Negative Negative                    [facility]



        STOOL           (18 10:48 AM)         /2018                   [facility]



                                                                



                                                                



                                                                

 

        URINE AND UA pH   7.0     5.0 - 8.0                    [facility]                                              [facility]



                                                                



                                                                



                                                                

 

        URINE AND UA Spec Grav 1.015   <=1.030                    [facility]                                              [facility]



                                                                



                                                                



                                                                

 

        URINE AND UA Color Yellow  Yellow                     [facility]



        STOOL           *NA*            /                   Medical



                        (18 10:48 AM)                                 [facility]

r



                                                                



                                                                



                                                                

 

        URINE AND UA Turbidity Clear   Clear                      [facility]           (18 10:48 AM)                            [facility]



                                                                



                                                                



                                                                

 

        URINE AND UA Mucus Few /LPF None Seen                    [facility]                   /LPF    /                   [facility]



                                                                



                                                                



                                                                

 

        URINE AND UA Bacteria Few /HPF None Seen                    [facility]

s



        STOOL                   /HPF                       [facility]



                                                                



                                                                



                                                                

 

        URINE AND UA RBC  0-2 /HPF 0 - 2                      [facility]                                              [facility]



                                                                



                                                                



                                                                

 

        URINE AND UA Sq Epi None Seen Few                        [facility]           (18 10:48 AM)                            [facility]



                                                                



                                                                



                                                                

 

        URINE AND UA WBC    None Seen                    [facility]           /HPF    /HPF                       [facility]



                                                                



                                                                



                                                                

 

        BLOOD BANK Antibody Scrn Negative                             [facility]

as



        RESULTS         (18 5:57 AM)                            [facility]



                                                                



                                                                



                                                                

 

        BLOOD BANK ABO/Rh  AB POS                             [facility] [place]



        RESULTS                         /                   [facility]



                                                                



                                                                



                                                                

 

        HEMATOLOGY PTT     33.4    22.9 -                      [place]



                                35.8                       [facility]



                                                                



                                                                



                                                                

 

        HEMATOLOGY PT      13.7    12.0 -                     [facility] [place]



                                14.7                       [facility]



                                                                



                                                                



                                                                

 

        HEMATOLOGY INR     1.05    0.85 -                     [facility] [place]



                                1.17                       [facility]



                                                                



                                                                



                                                                

 

        CHEM PANEL eGFR    133                        Result  [facility] [place]



                                                   Comment: The Medical



                                                        eGFR is Center



                                                        calculated 



                                                        using the 



                                                        CKD-EPI 



                                                        formula. In 



                                                        most young, 



                                                        healthy 



                                                        individuals 



                                                        the eGFR will 



                                                        be >90  



                                                        mL/min/1.73m2 



                                                        . The eGFR 



                                                        declines with 



                                                        age. An eGFR 



                                                        of 60-89 may 



                                                        be normal in 



                                                        some    



                                                        populations, 



                                                        particularly 



                                                        the elderly, 



                                                        for whom the 



                                                        CKD-EPI 



                                                        formula has 



                                                        not been 



                                                        extensively 



                                                        validated. 



                                                        Use of the 



                                                        eGFR is not 



                                                        recommended 



                                                        in the  



                                                        following 



                                                        populations:< 



                                                        br/><br/>[place] 



                                                        viduals with 



                                                        unstable 



                                                        creatinine 



                                                        concentration 



                                                        s, including 



                                                        pregnant 



                                                        patients and 



                                                        those with 



                                                        serious 



                                                        co-morbid 



                                                        conditions.<b 



                                                        r/><br/>Patie 



                                                        nts with 



                                                        extremes in 



                                                        muscle mass 



                                                        or diet. 



                                                        <br/><br/>The 



                                                        data above 



                                                        are obtained 



                                                        from the 



                                                        [employer] 



                                                        [place]  



                                                        Disease 



                                                        Education 



                                                        Program 



                                                        (NKDEP) which 



                                                        additionally 



                                                        recommends 



                                                        that when the 



                                                        eGFR is used 



                                                        in patients 



                                                        with extremes 



                                                        of body mass 



                                                        index for 



                                                        purposes of 



                                                        drug dosing, 



                                                        the eGFR 



                                                        should be 



                                                        multiplied by 



                                                        the estimated 



                                                        BMI.    



                                                                

 

        CHEM PANEL Calcium Lvl 9.4     8.5 - 10.5                     [facility]

[facility]



                                        /2018                   [facility]



                                                                



                                                                



                                                                

 

        CHEM PANEL CO2     28      24 - 32                     [place]



                                        /2018                   [facility]



                                                                



                                                                



                                                                

 

        CHEM PANEL BUN     14      7 - 22                      [place]



                                        /2018                   [facility]



                                                                



                                                                



                                                                

 

        CHEM PANEL Glucose Lvl 89      70 - 99                     [place]



                                        /2018                   [facility]



                                                                



                                                                



                                                                

 

        CHEM PANEL Chloride Lvl 104     95 - 109                     a

s



                                                           [facility]



                                                                



                                                                



                                                                

 

        CHEM PANEL Potassium Lvl 4.2     3.5 - 5.1                     [place]



                                                           [facility]



                                                                



                                                                



                                                                

 

        CHEM PANEL Sodium Lvl 137     135 - 145                     [place]



                                        /2018                   [facility]



                                                                



                                                                



                                                                

 

        CHEM PANEL Creatinine 0.85    0.50 -                     [facility] [place]



                Lvl             1.40                       [facility]



                                                                



                                                                



                                                                

 

        CHEM PANEL AGAP    9.2     10.0 -                      [place]



                                20.0                       [facility]



                                                                



                                                                



                                                                

 

        HEMATOLOGY ACT (TEG) 136     86 - 118                     [place]



                Rapid                   /2018                   [facility]



                                                                



                                                                



                                                                

 

        HEMATOLOGY Split Point 0.6                                 [place]



                Rapid                   /2018                   [facility]



                                                                



                                                                



                                                                

 

        HEMATOLOGY R-time Rapid 0.9     0.4 - 0.7                     [facility]

[facility]



                                        /2018                   [facility]



                                                                



                                                                



                                                                

 

        HEMATOLOGY K-time Rapid 1.4     0.6 - 2.3                     [facility]

[facility]



                                        /2018                   [facility]



                                                                



                                                                



                                                                

 

        HEMATOLOGY Angle Rapid 71      64 - 80                     [place]



                                        /2018                   [facility]



                                                                



                                                                



                                                                

 

        HEMATOLOGY G-value Rapid 12.7    5.0 - 11.6                     [place]



                                                           [facility]



                                                                



                                                                



                                                                

 

        HEMATOLOGY Max Amplitude 72      52 - 71                     [place]

[place]



                Rapid                                      [facility]



                                                                



                                                                



                                                                

 

        HEMATOLOGY Estimated % 0.1     0.0 - 7.5                     [place]

[place]



                Lysis Rapid                 /2018                   [facility]



                                                                



                                                                



                                                                

 

        HEMATOLOGY Platelet 367     133 - 450                     [place]



                                        /2018                   [facility]



                                                                



                                                                



                                                                

 

        HEMATOLOGY MPV     7.8     7.4 - 10.4                     [place]



                                        /2018                   [facility]



                                                                



                                                                



                                                                

 

        HEMATOLOGY MCH     27.4    27.0 -                      [place]



                                31.0                       [facility]



                                                                



                                                                



                                                                

 

        HEMATOLOGY MCV     80.7    80.0 -                      [place]



                                94.0                       [facility]



                                                                



                                                                



                                                                

 

        HEMATOLOGY MCHC    34.0    32.0 -                     [facility] [place]



                                36.0                       [facility]



                                                                



                                                                



                                                                

 

        HEMATOLOGY RDW     18.9    11.5 -  11                    [place]



                                14.5                       [facility]



                                                                



                                                                



                                                                

 

        HEMATOLOGY Hct     43.3    42.0 -  11                    [place]



                                54.0                       [facility]



                                                                



                                                                



                                                                

 

        HEMATOLOGY WBC     9.3     3.7 - 10.4                     [place]



                                        /2018                   [facility]



                                                                



                                                                



                                                                

 

        HEMATOLOGY Hgb     14.7    14.0 -                      [place]



                                18.0                       [facility]



                                                                



                                                                



                                                                

 

        HEMATOLOGY RBC     5.36    4.70 -  11                    [place]



                                6.10                       [facility]



                                                                



                                                                



                                                                

 

        HEMATOLOGY Eosinophils # 0.2     0.0 - 0.5 11                   [facility]

                   [facility]



                                                                



                                                                



                                                                

 

        HEMATOLOGY Basophils # 0.1     0.0 - 0.2                    [facility]

                   [facility]



                                                                



                                                                



                                                                

 

        HEMATOLOGY Lymphocytes # 1.8     1.0 - 5.5                    [facility]                   [facility]



                                                                



                                                                



                                                                

 

        HEMATOLOGY Monocytes # 0.9     0.0 - 0.8                    [facility]

                   [facility]



                                                                



                                                                



                                                                

 

        HEMATOLOGY Neutrophils # 6.3     1.5 - 8.1                    [facility]                   [facility]



                                                                



                                                                



                                                                

 

        HEMATOLOGY Eosinophils 2.0     0.0 - 4.0                     

                   [facility]



                                                                



                                                                



                                                                

 

        HEMATOLOGY Segs    67.4    45.0 -                      [place]



                                75.0                       [facility]



                                                                



                                                                



                                                                

 

        HEMATOLOGY Lymphocytes 19.9    20.0 -                      [place]



                                40.0                       [facility]



                                                                



                                                                



                                                                

 

        HEMATOLOGY Basophils 1.0     0.0 - 1.0                     [place]



                                        /2018                   [facility]



                                                                



                                                                



                                                                

 

        HEMATOLOGY Monocytes 9.7     2.0 - 12.0                     [place]



                                                           [facility]



                                                                



                                                                



                                                                

 

        CHEM PANEL B/C Ratio 17      6 - 25                      [place]



                                        /2018                   [facility]



                                                                



                                                                



                                                                

 

        CHEM PANEL Globulin 4.3     2.7 - 4.2                     [place]



                                        /2018                   [facility]



                                                                



                                                                



                                                                

 

        CHEM PANEL A/G Ratio 0.7     0.7 - 1.6                     [place]



                                        /2018                   [facility]



                                                                



                                                                



                                                                

 

        CHEM PANEL AGAP    14.4    10.0 -                      [place]



                                20.0                       [facility]



                                                                



                                                                



                                                                

 

        CHEM PANEL eGFR    113                        Result   [place]



                                        /2018           Comment: [facility]



                                                        eGFR is Center



                                                        calculated 



                                                        using the 



                                                        CKD-EPI 



                                                        formula. In 



                                                        most young, 



                                                        healthy 



                                                        individuals 



                                                        the eGFR will 



                                                        be >90  



                                                        mL/min/1.73m2 



                                                        . The eGFR 



                                                        declines with 



                                                        age. An eGFR 



                                                        of 60-89 may 



                                                        be normal in 



                                                        some    



                                                        populations, 



                                                        particularly 



                                                        the elderly, 



                                                        for whom the 



                                                        CKD-EPI 



                                                        formula has 



                                                        not been 



                                                        extensively 



                                                        validated. 



                                                        Use of the 



                                                        eGFR is not 



                                                        recommended 



                                                        in the  



                                                        following 



                                                        populations:< 



                                                        br/><br/>[place] 



                                                        viduals with 



                                                        unstable 



                                                        creatinine 



                                                        concentration 



                                                        s, including 



                                                        pregnant 



                                                        patients and 



                                                        those with 



                                                        serious 



                                                        co-morbid 



                                                        conditions.<b 



                                                        r/><br/>Patie 



                                                        nts with 



                                                        extremes in 



                                                        muscle mass 



                                                        or diet. 



                                                        <br/><br/>The 



                                                        data above 



                                                        are obtained 



                                                        from the 



                                                        [employer] 



                                                        [place]  



                                                        Disease 



                                                        Education 



                                                        Program 



                                                        (NKDEP) which 



                                                        additionally 



                                                        recommends 



                                                        that when the 



                                                        eGFR is used 



                                                        in patients 



                                                        with extremes 



                                                        of body mass 



                                                        index for 



                                                        purposes of 



                                                        drug dosing, 



                                                        the eGFR 



                                                        should be 



                                                        multiplied by 



                                                        the estimated 



                                                        BMI.    



                                                                

 

        CHEM PANEL Alk Phos 76      39 - 136 05/                   [address]



                                                                



                                                                



                                                                

 

        CHEM PANEL ALT     35      0 - 65  05                   [address]



                                                                



                                                                



                                                                

 

        CHEM PANEL Albumin Lvl 2.8     3.5 - 5.0                    [address]



                                                                



                                                                



                                                                

 

        CHEM PANEL Total Protein 7.1     6.4 - 8.4                    [address]



                                                                



                                                                



                                                                

 

        CHEM PANEL Calcium Lvl 8.7     8.5 - 10.5 /                   [address]



                                                                



                                                                



                                                                

 

        CHEM PANEL AST     18      0 - 37  05/                   [address]



                                                                



                                                                



                                                                

 

        CHEM PANEL Bili Total 0.3     0.2 - 1.3 /                   [address]



                                                                



                                                                



                                                                

 

        CHEM PANEL Potassium Lvl 4.4     3.5 - 5.1 /                   [address]



                                                                



                                                                



                                                                

 

        CHEM PANEL Chloride Lvl 109     95 - 109 05/                   [address]



                                                                



                                                                



                                                                

 

        CHEM PANEL CO2     23      24 - 32 05/                   [address]



                                                                



                                                                



                                                                

 

        CHEM PANEL Glucose Lvl 114     70 - 99 05/                   [address]



                                                                



                                                                



                                                                

 

        CHEM PANEL Creatinine 1.01    0.50 -  05/                   [facility] [place]



                Lvl             1.40    /                   [facility]



                                                                



                                                                



                                                                

 

        CHEM PANEL BUN     17      7 - 22  05/                   [address]



                                                                



                                                                



                                                                

 

        CHEM PANEL Sodium Lvl 142     135 - 145 05/                   [address]



                                                                



                                                                



                                                                

 

        HEMATOLOGY Basophils 0.6     0.0 - 1.0 /                   [address]



                                                                



                                                                



                                                                

 

        HEMATOLOGY Segs-Bands # 4.8     1.5 - 8.1 /                   [address]



                                                                



                                                                



                                                                

 

        HEMATOLOGY Monocytes # 0.7     0.0 - 0.8 /                   [address]



                                                                



                                                                



                                                                

 

        HEMATOLOGY Lymphocytes # 1.9     1.0 - 5.5 05                                       [facility]



                                                                



                                                                



                                                                

 

        HEMATOLOGY Monocytes 9.4     2.0 - 12.0                     [place]



                                        /2018                   [facility]



                                                                



                                                                



                                                                

 

        HEMATOLOGY Eosinophils # 0.2     0.0 - 0.5                                        [facility]



                                                                



                                                                



                                                                

 

        HEMATOLOGY Eosinophils 2.9     0.0 - 4.0                     [place]

[place]



                                        /                   [facility]



                                                                



                                                                



                                                                

 

        HEMATOLOGY Segs    62.2    45.0 -                      [place]



                                75.0                       [facility]



                                                                



                                                                



                                                                

 

        HEMATOLOGY Lymphocytes 24.9    20.0 -                      [place]



                                40.0    /                   [facility]



                                                                



                                                                



                                                                

 

        HEMATOLOGY MCH     27.5    27.0 -                      [place]



                                31.0                       [facility]



                                                                



                                                                



                                                                

 

        HEMATOLOGY MCV     85.3    80.0 -                      [place]



                                94.0                       [facility]



                                                                



                                                                



                                                                

 

        HEMATOLOGY Hct     43.9    42.0 -                      [place]



                                54.0                       [facility]



                                                                



                                                                



                                                                

 

        HEMATOLOGY Hgb     14.2    14.0 -                      [place]



                                18.0                       [facility]



                                                                



                                                                



                                                                

 

        HEMATOLOGY WBC     7.7     3.7 - 10.4                     [place]



                                        /2018                   [facility]



                                                                



                                                                



                                                                

 

        HEMATOLOGY RBC     5.15    4.70 -                      [place]



                                6.10                       [facility]



                                                                



                                                                



                                                                

 

        HEMATOLOGY MPV     8.4     7.4 - 10.4                     [place]



                                        /2018                   [facility]



                                                                



                                                                



                                                                

 

        HEMATOLOGY MCHC    32.3    32.0 -                      [place]



                                36.0                       [facility]



                                                                



                                                                



                                                                

 

        HEMATOLOGY RDW     17.3    11.5 -                      [place]



                                14.5    /                   [facility]



                                                                



                                                                



                                                                

 

        HEMATOLOGY Platelet 317     133 - 450                     [place]



                                                           [facility]



                                                                



                                                                



                                                                

 

        CHEM PANEL Globulin 4.4     2.7 - 4.2                     [place]



                                        /2018                   [facility]



                                                                



                                                                



                                                                

 

        CHEM PANEL A/G Ratio 0.6     0.7 - 1.6                     [place]



                                        /2018                   [facility]



                                                                



                                                                



                                                                

 

        CHEM PANEL B/C Ratio 17      6 - 25                      [place]



                                        /2018                   [facility]



                                                                



                                                                



                                                                

 

        CHEM PANEL AGAP    11.3    10.0 -                      [place]



                                20.0                       [facility]



                                                                



                                                                



                                                                

 

        CHEM PANEL eGFR    134             05/           [facility] [place]



                                        /2018           Comment: [facility]



                                                        eGFR is Center



                                                        calculated 



                                                        using the 



                                                        CKD-EPI 



                                                        formula. In 



                                                        most young, 



                                                        healthy 



                                                        individuals 



                                                        the eGFR will 



                                                        be >90  



                                                        mL/min/1.73m2 



                                                        . The eGFR 



                                                        declines with 



                                                        age. An eGFR 



                                                        of 60-89 may 



                                                        be normal in 



                                                        some    



                                                        populations, 



                                                        particularly 



                                                        the elderly, 



                                                        for whom the 



                                                        CKD-EPI 



                                                        formula has 



                                                        not been 



                                                        extensively 



                                                        validated. 



                                                        Use of the 



                                                        eGFR is not 



                                                        recommended 



                                                        in the  



                                                        following 



                                                        populations:< 



                                                        br/><br/>Mary 



                                                        viduals with 



                                                        unstable 



                                                        creatinine 



                                                        concentration 



                                                        s, including 



                                                        pregnant 



                                                        patients and 



                                                        those with 



                                                        serious 



                                                        co-morbid 



                                                        conditions.<b 



                                                        r/><br/>Patie 



                                                        nts with 



                                                        extremes in 



                                                        muscle mass 



                                                        or diet. 



                                                        <br/><br/>The 



                                                        data above 



                                                        are obtained 



                                                        from the 



                                                        [employer] 



                                                        [place]  



                                                        Disease 



                                                        Education 



                                                        Program 



                                                        (NKDEP) which 



                                                        additionally 



                                                        recommends 



                                                        that when the 



                                                        eGFR is used 



                                                        in patients 



                                                        with extremes 



                                                        of body mass 



                                                        index for 



                                                        purposes of 



                                                        drug dosing, 



                                                        the eGFR 



                                                        should be 



                                                        multiplied by 



                                                        the estimated 



                                                        BMI.    



                                                                

 

        CHEM PANEL Creatinine 0.84    0.50 -                     [facility] [place]



                Lvl             1.40                       [facility]



                                                                



                                                                



                                                                

 

        CHEM PANEL Sodium Lvl 142     135 - 145                    [address]



                                                                



                                                                



                                                                

 

        CHEM PANEL Glucose Lvl 99      70 - 99                    [address]



                                                                



                                                                



                                                                

 

        CHEM PANEL BUN     14      7 - 22                     [address]



                                                                



                                                                



                                                                

 

        CHEM PANEL Alk Phos 79      39 - 136                    [address]



                                                                



                                                                



                                                                

 

        CHEM PANEL Bili Total 0.3     0.2 - 1.3                    [address]



                                                                



                                                                



                                                                

 

        CHEM PANEL AST     14      0 - 37                     [address]



                                                                



                                                                



                                                                

 

        CHEM PANEL ALT     43      0 - 65                     [address]



                                                                



                                                                



                                                                

 

        CHEM PANEL Total Protein 7.1     6.4 - 8.4                    [address]



                                                                



                                                                



                                                                

 

        CHEM PANEL Albumin Lvl 2.7     3.5 - 5.0                    [address]



                                                                



                                                                



                                                                

 

        CHEM PANEL Calcium Lvl 9.2     8.5 - 10.5                    [facility]

as



                                        /2018                   [facility]



                                                                



                                                                



                                                                

 

        CHEM PANEL CO2     21      24 - 32                    [address]



                                                                



                                                                



                                                                

 

        CHEM PANEL Potassium Lvl 4.3     3.5 - 5.1                    [address]



                                                                



                                                                



                                                                

 

        CHEM PANEL Chloride Lvl 114     95 - 109                    [facility]                   [facility]



                                                                



                                                                



                                                                

 

        HEMATOLOGY MCHC    32.5    32.0 -                     [facility] [place]



                                36.0                       [facility]



                                                                



                                                                



                                                                

 

        HEMATOLOGY RDW     17.5    11.5 -  05/                   [facility] [place]



                                14.5                       [facility]



                                                                



                                                                



                                                                

 

        HEMATOLOGY Platelet 400     133 - 450                    [address]



                                                                



                                                                



                                                                

 

        HEMATOLOGY MPV     8.5     7.4 - 10.4                    [address]



                                                                



                                                                



                                                                

 

        HEMATOLOGY WBC     6.6     3.7 - 10.4                     [place]



                                        /2018                   [facility]



                                                                



                                                                



                                                                

 

        HEMATOLOGY RBC     5.17    4.70 -                      [place]



                                6.10                       [facility]



                                                                



                                                                



                                                                

 

        HEMATOLOGY MCV     86.1    80.0 -                      [place]



                                94.0                       [facility]



                                                                



                                                                



                                                                

 

        HEMATOLOGY Hct     44.5    42.0 -                      [place]



                                54.0                       [facility]



                                                                



                                                                



                                                                

 

        HEMATOLOGY MCH     28.0    27.0 -                      [place]



                                31.0                       [facility]



                                                                



                                                                



                                                                

 

        HEMATOLOGY Hgb     14.5    14.0 -                      [place]



                                18.0                       [facility]



                                                                



                                                                



                                                                

 

        HEMATOLOGY Lymphocytes # 1.7     1.0 - 5.5                    [facility]



                                                           [facility]



                                                                



                                                                



                                                                

 

        HEMATOLOGY Monocytes # 0.7     0.0 - 0.8                    [facility]

                   [facility]



                                                                



                                                                



                                                                

 

        HEMATOLOGY Eosinophils # 0.2     0.0 - 0.5                    [facility]



                                                           [facility]



                                                                



                                                                



                                                                

 

        HEMATOLOGY Lymphocytes 26.1    20.0 -                      [place]



                                40.0                       [facility]



                                                                



                                                                



                                                                

 

        HEMATOLOGY Segs    59.3    45.0 -                      [place]



                                75.0                       [facility]



                                                                



                                                                



                                                                

 

        HEMATOLOGY Basophils 0.8     0.0 - 1.0                     [place]



                                        /2018                   [facility]



                                                                



                                                                



                                                                

 

        HEMATOLOGY Monocytes 10.5    2.0 - 12.0                     [place]



                                        /2018                   [facility]



                                                                



                                                                



                                                                

 

        HEMATOLOGY Eosinophils 3.3     0.0 - 4.0                    [facility]



                                                           [facility]



                                                                



                                                                



                                                                

 

        HEMATOLOGY Segs-Bands # 3.9     1.5 - 8.1                    [facility]

as



                                        /2018                   [facility]



                                                                



                                                                



                                                                

 

        TOXICOLOGY Vanco Tr TND 15:30pm                            [facility] [place]



                                                           [facility]



                                                                



                                                                



                                                                

 

        TOXICOLOGY Vanco Tr 9.1                                 [place]



                                        /2018                   [facility]



                                                                



                                                                



                                                                

 

        CHEM PANEL Glucose Lvl 74      70 - 99                     [place]



                                        /2018                   [facility]



                                                                



                                                                



                                                                

 

        CHEM PANEL BUN     12      7 - 22                     [facility] [place]



                                        /                   [facility]



                                                                



                                                                



                                                                

 

        CHEM PANEL Creatinine 0.75    0.50 -                     [facility] [place]



                Lvl             1.40                       [facility]



                                                                



                                                                



                                                                

 

        CHEM PANEL eGFR    140             05           [facility] [place]



                                        /2018           Comment: [facility]



                                                        e[facility] is Center



                                                        calculated 



                                                        using the 



                                                        CKD-EPI 



                                                        formula. In 



                                                        most young, 



                                                        healthy 



                                                        individuals 



                                                        the eGFR will 



                                                        be >90  



                                                        mL/min/1.73m2 



                                                        . The eGFR 



                                                        declines with 



                                                        age. An eGFR 



                                                        of 60-89 may 



                                                        be normal in 



                                                        some    



                                                        populations, 



                                                        particularly 



                                                        the elderly, 



                                                        for whom the 



                                                        CKD-EPI 



                                                        formula has 



                                                        not been 



                                                        extensively 



                                                        validated. 



                                                        Use of the 



                                                        eGFR is not 



                                                        recommended 



                                                        in the  



                                                        following 



                                                        populations:< 



                                                        br/><br/>[facility] 



                                                        viduals with 



                                                        unstable 



                                                        creatinine 



                                                        concentration 



                                                        s, including 



                                                        pregnant 



                                                        patients and 



                                                        those with 



                                                        serious 



                                                        co-morbid 



                                                        conditions.<b 



                                                        r/><br/>Patie 



                                                        nts with 



                                                        extremes in 



                                                        muscle mass 



                                                        or diet. 



                                                        <br/><br/>The 



                                                        data above 



                                                        are obtained 



                                                        from the 



                                                        [employer] 



                                                        [place]  



                                                        Disease 



                                                        Education 



                                                        Program 



                                                        (NKDEP) which 



                                                        additionally 



                                                        recommends 



                                                        that when the 



                                                        eGFR is used 



                                                        in patients 



                                                        with extremes 



                                                        of body mass 



                                                        index for 



                                                        purposes of 



                                                        drug dosing, 



                                                        the eGFR 



                                                        should be 



                                                        multiplied by 



                                                        the estimated 



                                                        BMI.    



                                                                

 

        CHEM PANEL Albumin Lvl 2.9     3.5 - 5.0 05/                   [address]



                                                                



                                                                



                                                                

 

        CHEM PANEL Globulin 4.4     2.7 - 4.2                    [address]



                                                                



                                                                



                                                                

 

        CHEM PANEL A/G Ratio 0.7     0.7 - 1.6                    [address]



                                                                



                                                                



                                                                

 

        CHEM PANEL Bili Total 0.4     0.2 - 1.3                    [address]



                                                                



                                                                



                                                                

 

        CHEM PANEL Alk Phos 71      39 - 136 05/                   [address]



                                                                



                                                                



                                                                

 

        CHEM PANEL AST     15      0 - 37  05/                   [address]



                                                                



                                                                



                                                                

 

        CHEM PANEL ALT     28      0 - 65  05/                   [address]



                                                                



                                                                



                                                                

 

        CHEM PANEL Potassium Lvl 4.4     3.5 - 5.1                    [address]



                                                                



                                                                



                                                                

 

        CHEM PANEL Sodium Lvl 147     135 - 145 05/                   [address]



                                                                



                                                                



                                                                

 

        CHEM PANEL CO2     25      24 - 32 05                   [address]



                                                                



                                                                



                                                                

 

        CHEM PANEL Chloride Lvl 114     95 - 109 05                   [address]



                                                                



                                                                



                                                                

 

        CHEM PANEL B/C Ratio 16      6 - 25  /                   [address]



                                                                



                                                                



                                                                

 

        CHEM PANEL Calcium Lvl 8.7     8.5 - 10.5 /                   [address]



                                                                



                                                                



                                                                

 

        CHEM PANEL AGAP    12.4    10.0 -  05/                   [facility] [place]



                                20.0                       [facility]



                                                                



                                                                



                                                                

 

        CHEM PANEL Total Protein 7.3     6.4 - 8.4                    [address]



                                                                



                                                                



                                                                

 

        HEMATOLOGY Platelet 345     133 - 450 05/                   [address]



                                                                



                                                                



                                                                

 

        HEMATOLOGY MPV     8.7     7.4 - 10.4                    [address]



                                                                



                                                                



                                                                

 

        HEMATOLOGY WBC     6.9     3.7 - 10.4 05                    [place]



                                        /2018                   [facility]



                                                                



                                                                



                                                                

 

        HEMATOLOGY RBC     5.30    4.70 -  05                    [place]



                                6.10                       [facility]



                                                                [place]



                                                                



                                                                



                                                                

 

        HEMATOLOGY MCHC    32.8    32.0 -  05                    [place]



                                36.0                       [facility]



                                                                



                                                                



                                                                

 

        HEMATOLOGY MCH     27.9    27.0 -                      [place]



                                31.0                       [facility]



                                                                



                                                                



                                                                

 

        HEMATOLOGY Hgb     14.8    14.0 -                      [place]



                                18.0                       [facility]



                                                                



                                                                



                                                                

 

        HEMATOLOGY MCV     85.0    80.0 -                      [place]



                                94.0                       [facility]



                                                                



                                                                



                                                                

 

        HEMATOLOGY Hct     45.1    42.0 -                      [place]



                                54.0                       [facility]



                                                                



                                                                



                                                                

 

        HEMATOLOGY RDW     17.5    11.5 -  05                    [place]



                                14.5                       [facility]



                                                                



                                                                



                                                                

 

        HEMATOLOGY Eosinophils # 0.2     0.0 - 0.5 05                   [facility]

[place]



                                                           [facility]



                                                                



                                                                



                                                                

 

        HEMATOLOGY Lymphocytes # 2.0     1.0 - 5.5                    [facility]

[place]



                                                           [facility]



                                                                



                                                                



                                                                

 

        HEMATOLOGY Monocytes # 0.7     0.0 - 0.8                    [facility]

s



                                                           [facility]



                                                                



                                                                



                                                                

 

        HEMATOLOGY Eosinophils 2.4     0.0 - 4.0                    [facility]

s



                                                           [facility]



                                                                



                                                                



                                                                

 

        HEMATOLOGY Basophils 0.6     0.0 - 1.0 05                    [place]



                                        /2018                   [facility]



                                                                



                                                                



                                                                

 

        HEMATOLOGY Segs-Bands # 4.0     1.5 - 8.1                     [facility]

[place]



                                        /2018                   [facility]



                                                                



                                                                



                                                                

 

        HEMATOLOGY Monocytes 10.4    2.0 - 12.0                     [place]



                                        /2018                   [facility]



                                                                



                                                                



                                                                

 

        HEMATOLOGY RBC Morph Normal                             [facility] [place]



                        (18 2:07 AM)                            [facility]



                                                                



                                                                



                                                                

 

        HEMATOLOGY Segs    58.1    45.0 -                      [place]



                                75.0                       [facility]



                                                                



                                                                



                                                                

 

        HEMATOLOGY Plt Morph Normal                             [facility] [place]



                        (18 2:07 AM)                            [facility]



                                                                



                                                                



                                                                

 

        HEMATOLOGY Lymphocytes 28.5    20.0 -  05                    [place]



                                40.0                       [facility]



                                                                



                                                                



                                                                

 

        HEMATOLOGY Basophils # 0.1     0.0 - 0.2                    [facility]



                                                           [facility]



                                                                



                                                                



                                                                

 

        HEMATOLOGY Polychrom Moderate None Seen                    [facility] [place]



                        *ABN*           /                   [facility]



                        (18 11:07 AM)                                 [place]



                                                                



                                                                



                                                                

 

        TOXICOLOGY Vanco Tr TND *               05                   [facility] [place]



                                                           [facility]



                                                                



                                                                



                                                                

 

        TOXICOLOGY Vanco Tr 22.3            05                   [facility] [place]



                                                           [facility]



                                                                



                                                                



                                                                

 

        CHEM PANEL Magnesium Lvl 2.3     1.8 - 2.4                     [place]

[place]



                                        /                   [facility]



                                                                



                                                                



                                                                

 

        CHEM PANEL Phosphorus 3.5     2.5 - 4.5                     [place]



                                        /2018                   [facility]



                                                                



                                                                



                                                                

 

        HEMATOLOGY PT      14.0    12.0 -                      [place]



                                14.7                       [facility]



                                                                



                                                                



                                                                

 

        HEMATOLOGY PTT     37.6    22.9 -                      [place]



                                35.8                       [facility]



                                                                



                                                                



                                                                

 

        HEMATOLOGY INR     1.08    0.85 -                      [place]



                                1.17                       [facility]



                                                                



                                                                



                                                                

 

        IMMUNOLOGY C-REACTIVE 13.1    <=2.9 mg/L                     [place]

[place]



                PROTEIN                 /                   [facility]



                                                                



                                                                



                                                                

 

        IMMUNOLOGY Prealbumin 25.2    18.0 -                      [place]



                                45.0                       [facility]



                                                                



                                                                



                                                                

 

        CHEM PANEL Lactic Acid 0.9     0.5 - 2.2                     [place]

[place]



                Lvl                     /                   [facility]



                                                                



                                                                



                                                                

 

        HEMATOLOGY Sed Rate 5       0 - 15                      [place]



                                        /2018                   [facility]



                                                                



                                                                



                                                                

 

        IMMUNOLOGY C-REACTIVE 15.8    <=2.9 mg/L                    [facility]



                PROTEIN                                    [facility]



                                                                



                                                                



                                                                

 

        HEMATOLOGY PT      13.0    12.0 -                      [place]



                                14.7                       [facility]



                                                                



                                                                



                                                                

 

        HEMATOLOGY INR     0.98    0.85 -                      [place]



                                1.17                       [facility]



                                                                



                                                                



                                                                

 

        HEMATOLOGY PTT     33.2    22.9 -                      [place]



                                35.8                       [facility]



                                                                



                                                                



                                                                

 

        BLOOD BANK Antibody Scrn Negative                            [facility]

[place]



        RESULTS         (5/3/18 6:51 PM)         /                   [facility]



                                                                



                                                                



                                                                

 

        BLOOD BANK ABO/Rh  AB POS                             [facility] [place]



        RESULTS                         /                   [facility]



                                                                



                                                                



                                                                

 

        URINE AND UA      0.2     0.1 - 1.0                    [facility]



        STOOL   Urobilinogen                 /                   [facility]



                                                                



                                                                



                                                                

 

        URINE AND UA Nitrite Negative Negative                    [facility]



        STOOL           (5/3/18 6:35 PM)         /                   [facility]



                                                                



                                                                



                                                                

 

        URINE AND UA Glucose Negative Negative                    [facility]



        STOOL           (5/3/18 6:35 PM)         /                   [facility]



                                                                



                                                                



                                                                

 

        URINE AND UA Ketones Negative Negative                    [facility]



        STOOL           *NA*            /                   [facility]



                        (5/3/18 6:35 PM)                                 [place]



                                                                



                                                                



                                                                

 

        URINE AND UA Bili Negative Negative                    [facility]



        STOOL           *NA*            /                   [facility]



                        (5/3/18 6:35 PM)                                 [place]



                                                                



                                                                



                                                                

 

        URINE AND UA Blood Trace   Negative                    [facility]



        STOOL           *ABN*           /                   [facility]



                        (5/3/18 6:35 PM)                                 [place]



                                                                



                                                                



                                                                

 

        URINE AND UA Leuk Est Small   Negative                    [facility]           *ABN*                              [facility]



                        (5/3/18 6:35 PM)                                 [place]



                                                                



                                                                



                                                                

 

        URINE AND UA Spec Grav 1.020   <=1.030                    [facility]                           /[address] [place] [address]



                                                                



                                                                



                                                                

 

        URINE AND UA pH   6.0     5.0 - 8.0                    [address]



                                                                



                                                                



                                                                

 

        URINE AND UA Color Yellow  Yellow                     [facility]           *NA*            /                   [facility]



                        (5/3/18 6:35 PM)                                 [place]



                                                                



                                                                



                                                                

 

        URINE AND UA Protein Trace   Negative                    [facility]           *ABN*                              [facility]



                        (5/3/18 6:35 PM)                                 [place]



                                                                



                                                                



                                                                

 

        URINE AND UA Turbidity Slight Cloudy Clear                      [facility]           (5/3/18 6:35 PM)                            [facility]



                                                                



                                                                



                                                                

 

        URINE AND UA Hyal Cast 0-2     0 - 2                      [facility]           (5/3/18 6:35 PM)         [address] [place] [address]



                                                                



                                                                



                                                                

 

        URINE AND UA Bacteria Occasional None Seen                     [place]

[place]



        STOOL           /HPF    /HPF                       [facility]



                                                                



                                                                



                                                                

 

        URINE AND UA RBC  11-20 /HPF 0 - 2                      [facility]                           /[address] [place] [address]



                                                                



                                                                



                                                                

 

        URINE AND UA Sq Epi Occasional Few /LPF                    [facility]



        STOOL           /LPF            /                   [facility]



                                                                



                                                                



                                                                

 

        URINE AND UA WBC    None Seen                    [facility]



        STOOL           /HPF    /HPF    /[address] [place] [address]



                                                                



                                                                



                                                                

 

        HEMATOLOGY Basophils # 0.1     0.0 - 0.2                     [place]

[place]



                                                           [facility]



                                                                



                                                                



                                                                

 

        HEMATOLOGY Polychrom Slight                             [address]



                                                                



                                                                



                                                                

 

        HEMATOLOGY Plt Morph Normal                             [facility] [place]



                        (5/3/18 6:20 PM)         [address]



                                                                



                                                                



                                                                







Pathology Reports







                                        No Data Provided for This Section







Diagnostic Reports







                Report          Value           Date            Source



                                                                

 

                    Brain-Outside Consult EXAM: CT BRAIN WITHOUT CONTRAST -- OUT

SIDE CONSULT 

2018                              [facility]



                CT              DATE: 2018 4:49 AM CST                 Cent

er



                                INDICATION: ' - PAIN'                 



                                COMPARISON: Noncontrast head CTs 2018, , 2013                 



                                                    TECHNIQUE: Noncontrast [facility] CT submitted for 2nd interpretation. 

205 images. Imaging was performed at [facility] on 
2018.                                         



                                IV contrast: None.                 



                                FINDINGS:                       



                                                    Overall unchanged exam. Righ

t transfrontal ventriculostomy catheter is 

unchanged in position. 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 
size and configuration. The abandoned right transparie                          

 



                                hudson catheter is unchanged. Stigmata of Chiari II

 malformation.                 



                                There is no intracranial hemorrhage or extra-axi

al collection.                 



                                                    No new parenchymal density a

bnormality. No mass or mass effect. Unchanged from 

the tonsillar herniation.                           



                                The density of the larger intracranial sinuses i

s normal.                 



                                                                



                                IMPRESSION: Unchanged examination compared to                  



                                        The final report agrees with the prelimi

nary report by the radiology resident. 

                                        



                                                                

 

                Brain shunt series DX EXAM: SKULL 2 VIEWS 2018       [facility]



                                EXAM: CHEST 2 VIEWS                 Center



                                EXAM: ABDOMEN 2 VIEWS                 



                                DATE: 5/3/2018 7:20 PM CDT                 



                                INDICATION: Headache. - Please include Codman vi

ew for shunt setting.                 



                                COMPARISON: Brain shuntogram 2013          

       



                                TECHNIQUE: AP and lateral views of the skull, ch

est and abdomen.                 



                                FINDINGS:                       



                                                    There is a single intact red

nt tube with the proximal aspect in the right 

frontal calvarium coursing across midline to the left anterior chest and abdomen
with the tip coiled within the pelvis.                           



                                                                



                                                    A right parietal shunt with 

distal tip in the right lateral abdomen is 

discontinuous. Another shunt present with proximal tip in the right neck base 
and distal tip in the medial mid abdomen                           



                                                    Cholecystectomy clips are no

huma. The lungs are clear but underinflated. 

Appearance of the pelvis is unchanged with bilateral shallow acetabular roofs. 
No obvious posterior dislocation. Spinal dysraphism                           



                                 is seen with absence of the spinous process fro

m L3 to S1.                 



                                                                



                                IMPRESSION:                     



                                                                



                                                    1. No significant change. Th

e right transfrontal shunt catheter is intact along

its course with the distal tip in the pelvis.                           



                                                    2. Discontinuous right parie

to-occipital catheter and 2 discontinuous catheters

in the right chest and abdomen are unchanged.                           



                                3. Spinal dysraphism.                 



                                                                

 

                Brain wo contrast CT EXAM: CT BRAIN WITHOUT CONTRAST 2018 

     [facility]



                                DATE: 5/3/2018 627 PM CDT                 Center



                                INDICATION: 32-year-old male patient with histor

y of  shunt malfunction                 



                                COMPARISON: CT of the brain 2015, 2013

.                 



                                                    TECHNIQUE: Axial CT images o

f the brain were obtained. Sagittal and coronal 

reformats.                                          



                                IV contrast: None.                 



                                FINDINGS:                       



                                                    An orphaned right occipital 

approach  shunt is present. Also present is a 

right frontal approach  shunt with tip in the left lateral ventricle.         

                  



                                Narrowing of the lateral ventricles is present, 

unchanged from 2015.                 



                                There is mild uncal and cerebellar tonsillar her

niation, also unchanged.                 



                                No recent hemorrhage or territorial infarct. No 

mass. No midline shift.                 



                                No scalp fluid collections.                 



                                Sinuses are clear.                 



                                IMPRESSION:                     



                                No acute intracranial abnormality.              

   



                                Adequately decompressed ventricular system.     

            



                                                                

 

                Chest 1view DX  EXAM: XR CHEST 1 VIEW 2018      [facility]

edical



                                DATE: 5/3/2018 6:12 PM CDT                 [facility]

r



                                                                



                                INDICATION:  - picc line use                 



                                UT SECTION: ER                  



                                COMPARISON: None.                 



                                TECHNIQUE: AP chest                 



                                FINDINGS:                       



                                Lines, tubes and hardware:                 



                                Left PICC tip at mid SVC.                 



                                                    Lungs and pleura: No pulmona

ry or pleural based abnormality is identified. 

Pulmonary vascularity is normal.                           



                                                    Heart and mediastinum: The h

eart size is normal for technique. The mediastinal 

contours are normal.                                



                                Bones: No acute bony abnormality is identified. 

                



                                Upper abdomen: Normal.                 



                                                                



                                IMPRESSION:                     



                                Left PICC tip at mid SVC.                 



                                No acute cardiopulmonary abnormality is observed

.                 



                                                                







Consultation Notes







                                        No Data Provided for This Section







Discharge Summaries







                                        No Data Provided for This Section







History and Physicals







                                        No Data Provided for This Section







Vital Signs







             Vital Sign   Value        Date         Comments     Source



                                                                 

 

             Respitory Rate 16           2018                [facility]



                                                                 

 

             Systolic (mm Hg) 138          2018                [facility]



                                                                 

 

             Diastolic (mm Hg) 68           2018                [facility]



                                                                 

 

             Temperature Oral (F) 98.4 F       2018                [facility]



                                                                 

 

             Temperature Oral (F) 98.6 F       2018                [facility]



                                                                 

 

             Systolic (mm Hg) 134          2018                [facility]



                                                                 

 

             Diastolic (mm Hg) 67           2018                [facility]



                                                                 

 

             Respitory Rate 18           2018                [facility]



                                                                 

 

             Systolic (mm Hg) 131          2018                [facility]



                                                                 

 

             Diastolic (mm Hg) 62           2018                [facility]



                                                                 

 

             Respitory Rate 16           2018                [facility]



                                                                 

 

             BMI Calculated 58.7         2018                [facility]



                                                                 

 

             Height       149.86 cm    2018                [facility]



                                                                 

 

             Weight       131.818      2018                [facility]



                                                                 

 

             Heart Rate   74           2018                [facility]

l



                                                                 Center



                                                                 

 

             Temperature Oral (F) 97.9 F       2018                [facility]



                                                                 

 

             Temperature Oral (F) 97.2 F       2018                [facility]



                                                                 

 

             Systolic (mm Hg) 127          2018                [facility] [place] [facility]

dical



                                                                 Center



                                                                 

 

             Diastolic (mm Hg) 85           2018                [facility]



                                                                 

 

             Heart Rate   81           2018                [facility]

l



                                                                 Center



                                                                 

 

             Respitory Rate 18           2018                [facility]



                                                                 

 

             Heart Rate   90           2018                [facility]

l



                                                                 Center



                                                                 

 

             Systolic (mm Hg) 106          2018                [facility] [place] [facility]

dical



                                                                 Center



                                                                 

 

             Diastolic (mm Hg) 71           2018                [facility] [place] [facility]

edical



                                                                 Center



                                                                 

 

             Respitory Rate 18           2018                [facility]



                                                                 

 

             Temperature Oral (F) 98.1 F       2018                [facility]



                                                                 

 

             Respitory Rate 18           2018                [facility]



                                                                 

 

             Systolic (mm Hg) 168          2018                [facility] [place] [facility]

dical



                                                                 Center



                                                                 

 

             Diastolic (mm Hg) 107          2018                [facility]



                                                                 

 

             Heart Rate   87           2018                [facility]

[facility]



                                                                 

 

             Temperature Oral (F) 98.1 F       2018                [facility]



                                                                 

 

             Weight       127.027      2018                [facility]

l



                                                                 Center



                                                                 

 

             Weight       127.027      2018                [facility]





                                                                 Center



                                                                 

 

             Weight       127.027      2018                [facility]

[facility]



                                                                 

 

             BMI Calculated 48.16        2018                [facility]



                                                                 

 

             Height       162.56 cm    2018                [facility]

[facility]



                                                                 

 

             Height       149.86 cm    2018                [facility]



                                                                 

 

             BMI Calculated 56.67        2018                [facility]



                                                                 







Encounters







       Location Location Encounter Encounter Reason Attending ADM    DC     Stat

us Source



              Details Type   Number For    Provider Date   Date          



                                   Visit                              



                                                                      



                                                                      



                                                                      

 

       [facility] [id number]        [facility]        

  [facility]         [facility]



                                                                      



                                                                      



                                                                      

 

       [facility]        Emergency [id number]        Peter         

 [facility]                             Saw /2018         [facility]



                                                                      



                                                                      



                                                                      

 

       [facility]           Phone  [phone]                        Misc

her



       Neurosurgery        Message                      /2019         [facility]

ro



       C                                                            



                                                                      



                                                                      



                                                                      

 

       [facility]           Phone  [phone]               07/15  07/17         Misc

her



       Neurosurgery        Message                      /2019         Blanca villalba



       [facility]                                                            



                                                                      



                                                                      



                                                                      

 

       [facility]           Phone  [phone]                        [facility]

her



       Neurosurgery        Message                      /2019         [facility] villalba



       [facility]                                                            



                                                                      



                                                                      



                                                                      

 

       [facility]           Phone  [phone]                        [facility]

her



       Neurosurgery        Message                      /2019         Blanca villalba



       [facility]                                                            



                                                                      



                                                                      



                                                                      







Procedures







           Procedure  Code       Date       Perfomer   Comments   Source



                                                                  



                                                                  

 

           Cholecystectomy [id number]                                    [facility]



                                                                  Neuro,[facility]



                                                                  

 

           Shunt of cerebral [id number]                                    [facility]



           ventricle to                                             Neuro,



           extracranial site                                             [facility]



                                                                  







Assessment and Plan







                    Assessment and Plan Date                Source



                                                            

 

                    Extracted from:Title: Ophthalmology Consultation 2018 

         [facility]



                    Author: Jazmyn Turner MD                     



                    Date: 18                           



                    CONSULTATION - OPHTHALMOLOGY                     



                    PATIENT NAME: Jordan CARDONA MR #: [id number]                       



                    ROOM:ED                                 



                    REQUESTING TEAM/ATTENDING: TRISTIAN                     



                    DATE OF CONSULT: 2018                     



                    CONSULTING ATTENDING: Alla Goldberg, MD                     



                    CONSULTING RESIDENT: Jazmyn Turner MD                     



                    REASON FOR CONSULT: Evaluate for disc edema                 

    



                    CHART REVIEWED: YES                     



                                        HISTORY OF PRESENT ILLNESS: The patient 

is a patient with PMH of spina bifida 

with  shunt with several week history of severe headaches. Ophthalmology 
consultated to rule out disc edema.                           



                    REVIEW OF SYSTEMS:                      



                    CONSTITUTIONAL: Denies fever, weight changes.               

      



                    MUSCULOSKELETAL: Denies generalized pain                    

 



                    SKIN: Denies rash.                      



                    EYES: As above.                         



                    ENT: Denies rhinorrhea, sore throat or hearing loss         

            



                    RESPIRATORY: Denies SOB.                     



                    CARDIOVASCULAR: Denies chest pain.                     



                    GASTROINTESTINAL: Denies nausea, vomiting, diarrhea.        

             



                    HEMATOPOETIC/LYMPHATIC: Denies bruising, LAD.               

      



                    GENITOURINARY: Denies change in UOP.                     



                    NEUROLOGICAL: Denies headache.                     



                    PSYCHIATRIC: Denies behavioral change.                     



                    ALLERGY/IMMUNE SYSTEM: Denies allergies.                    

 



                    PAST OCULAR HISTORY: per HPI                     



                    PAST MEDICAL HISTORY: per HPI                     



                    PAST SURGICAL HISTORY: no past ocular surgeries             

        



                    SOCIAL HISTORY: no etoh/smoking/drugs                     



                    FAMILY HISTORY: no ocular history                     



                    ALLERGIES: nkda                         



                    MEDICATIONS: none                       



                    EYE MEDICATION: none                     



                    EXAMINATION                             



                    NEURO/MS                                



                                        The patient is Alert but drowsy, no foca

l neurological or motor deficits, the 

patient was able to participate fully in the exam                           



                    VISUAL ACUITY (WITHOUT CORRECTION), TESTED ON A NEAR CARD   

                  



                    Right: 20/40 ( poor effort 2/2 severe headaches and drowsine

ss)                     



                    Left:  20/40 ( poor effort 2/2 severe headaches and drowsine

ss)                     



                    EXTRAOCULAR MOTILITY                     



                    Right: Full                             



                    Left: Full                              



                    CONFRONTATION VISUAL FIELD                     



                    Right: Full                             



                    Left: Full                              



                    COLOR SATURATION                        



                    Patient had no decrease in red color intensity between eyes 

                    



                    Right:   [address]                     



                    Left:  [address]                     



                    PUPILS                                  



                    Right: 4mm to 2mm, No afferent pupillary defect noted       

              



                    Left:  4mm to 2mm, No afferent pupillary defect noted       

              



                    INTRAOCULAR PRESSURE                     



                                        Symmetric and normal to palpation both e

yes. Right eye 23, left eye 26 using the

Tonopen.( patient squeezing)                           



                    EXTERNAL                                



                    Right: Within normal limits                     



                    Left: Within normal limits                     



                    ANTERIOR SEGMENT EXAM:                     



                    LIDS/LASHES/LACRIMALS                     



                    Right: Within normal limits                     



                    Left: Within normal limits                     



                    CONJUNCTIVA/SCLERA                      



                    Right: White and quiet                     



                    Left: White and quiet                     



                    CORNEA                                  



                    Right: Clear without epi defect                     



                    Left: Clear without epi defect                     



                    ANTERIOR CHAMBER                        



                    Right: Formed and grossly clear, no hyphema                 

    



                    Left: Formed and grossly clear, no hyphema                  

   



                    IRIS                                    



                    Right: Round and reactive                     



                    Left: Round and reactive.                     



                    LENS                                    



                    Right: Clear                            



                    Left: Clear                             



                                        DILATED FUNDUS EXAM: (Both eyes dilated 

with phenylephrine 2.5% and tropicamide 

1% @ 8:30AM)                                        



                    OPTIC NERVE:                            



                    Right: Pink, healthy nerve                     



                    Left: Pink, healthy nerve                     



                    C:D RATIO                               



                    Right: 0.2                              



                    Left: 0.2                               



                    POSTERIOR SEGMENT                       



                    Right: Macula, vessels, periphery within normal limits      

               



                    Left: Macula, vessels, periphery within normal limits       

              



                    DIAGNOSES/RECOMMENDATION:                     



                    1. Spina bifida                         



                    - No evidence of disc edema                     



                    - Management as per neuro-surgery                     



                                        The patient has been given information t

o call for an appointment to follow-up 

with Alla Goldberg, MD at the [facility] in in 3-4months. (Located: [address] [place] [address]; Appt #: [phone]).                           



                    Please re-consult if any changes develop.                   

  



                    Thank you for the consult.                     



                    Jazmyn Turner MD                        



                    Ophthalmology, PGY-2                     



                    Faculty note:                           



                                        Patient exam reviewed with the resident.

 I agree with above assessment and plan.

 Any additional findings or changes are detailed below.                         

  



                    examined for headaches. no evidence of papilledema          

           



                                                            

 

                    Extracted from:Title: Progress Note 2018          [facility]



                    Author: Deedee Reinoso MD                     



                    Date: 18                            



                              1.Acute headache,Acute headache                   

  



                                         possibly related to  infection, diffe

rential includes HA, neuropathy, and 

narcotic withdrawal                                 



                                         -Discussed with neurosurgery today do n

ot believe that there is any role for 

intervention, no evidence of infection, shunt is appropriate, no evidence of 
optic disc changes                                  



                     Discussed this with the patient                     



                     2. (ventriculoperitoneal) shunt status                   

  



                     Appears to be functioning well and decompressing the ventri

cles                     



                      3.Acute UTI                           



                                         Will discontinue vancomycin continue Zo

syn for the patient's prior 

osteomyelitis                                       



                     Urine cultures are negative                     



                     4.Decubitus ulcer                      



                     present on admission, evaluated by wound care, RN following

 recs daily                     



                                         -[facility]

 [facility] 

tomorrow.                                           



                     5.Acute pain                           



                                         MMP regimen implemented by [facility], discus

sed recommendations with team today, 

will add NSAID and trazodone for sleep                           



                       Lovenox                              



                                                            



                                                            



                    Extracted from:Title: [facility] Consult Note                     



                    Author: Cassie Traore MD                     



                    Date: 18                            



                                                            



                                        Patient:   JORDAN VERDIN JR       

     MRN: [id number]            FIN: 

[id number]                                        



                    Age:   32 years     Sex:  Male     :  1985         

            



                    Associated Diagnoses:   None                     



                    Author:   Cassie Traore MD                     



                    Basic Information                       



                    Referral source                         



                    Reason for consultation: Complex Acute Pain                 

    



                    Chief Complaint                         



                                        2018 17:41         transfer from [facility], [place] for  shunt 

malfunction/enlarged ventricles c/o HA/intermittent blurred vision x2 days 
moving extremeties bilaterally equally not amb per baseline no d                

           



                    istress noted [facility] cp lymphedema spina bifida multiple pressu

re ulcers                     



                    History of Present Illness                     



                              The patient presents with Location: _             

        



                    Quality: _                              



                    Severity: _                             



                    Timing: _                               



                    Duration: _                             



                    Context: _                              



                    Modifying factors: _                     



                                         , worsening headache c/b nausea, vomiti

ng in setting of prior  shunt. Per 

primary team no planned neurosurgical intervention at this time. consulted for 
help in managing patient's headache .                           



                    Histories                               



                    Past Medical History:                     



                    Resolved                                



                    Asthma ([id number]):  Resolved.                     



                    Bronchitis (44277811):  Resolved.                     



                    Cerebral palsy ([id number]):  Resolved.                     



                    Hydrocephalus (036047522):  Resolved.                     



                    Osteomyelitis (79266087):  Resolved.                     



                    Family History:                         



                    No family history items have been selected or recorded.     

                



                    Procedure history:                      



                    Cholecystectomy (SNOMED CT 12947359).                     



                    Shunt of cerebral ventricle to extracranial site (SNOMED CT 

817546847).                     



                    Social History                          



                    Social and Psychosocial Habits                     



                    Tobacco                                 



                    2018  Use: Current every day smoker                   

  



                      Exposure to Tobacco Smoke Lives with someone who sm       

              



                      Cigarette Smoking Last 365 Days Yes                     



                      Reg Smoking Cessation Counseling Yes                     



                    .                                       



                    Health Status                           



                    Allergies:                              



                    Allergic Reactions (All)                     



                    Severity Not Documented                     



                    Amoxicillin- No reactions were documented.                  

   



                    Bactrim- No reactions were documented.                     



                    Levaquin- No reactions were documented.                     



                    Minocin- No reactions were documented.                     



                    Morphine- No reactions were documented.                     



                    Toradol- No reactions were documented.                     



                    Zofran- No reactions were documented.                     



                    Canceled/Inactive Reactions (All)                     



                    Severity Not Documented                     



                    Vancomycin- No reactions were documented.,                  

   



                    Allergies (7) Active        Reaction                     



                    amoxicillin        None Documented                     



                    Bactrim        None Documented                     



                    Levaquin        None Documented                     



                    Minocin        None Documented                     



                    morphine        None Documented                     



                    Toradol        None Documented                     



                    Zofran        None Documented                     



                    Current medications:  (Selected)                     



                    Inpatient Medications                     



                    Ordered                                 



                    Beneprotein 7 gm pkt: 2 pkt, PO, BID-Before Meals           

          



                    Lidoderm 5% topical film (patch): 3 patch, TOP, Daily       

              



                    Robaxin: 500 mg, 1 tab, PO, TID                     



                    Saline Flush 0.9%: 10 ml, IVP, PRN, PRN: Line Flush         

            



                    Sodium Chloride 0.9% IV 1,000 mL: 125 ml/hr, IV, Stop:  1:45:00 CDT                     



                    Zosyn: 3.375 gm, IVPB, ABXQ6H                     



                    acetaminophen: 1,000 mg, 2 tab, PO, Q6Hnow                  

   



                    ascorbic acid: 500 mg, 1 tab, PO, BID                     



                    docusate: 100 mg, 1 cap, PO, BID                     



                    enoxaparin: 40 mg, 0.4 mL, SUB-Q, [id number]                  

   



                    gabapentin: 300 mg, 1 cap, PO, Q8Hnow                     



                    multivitamin: 1 tab, PO, Daily                     



                    normal saline 0.9% IV 1,000 mL: 75 ml/hr, IV, Stop: 18

 22:38:00 CDT                     



                    oxyCODONE 5 mg immediate release: 10 mg, 2 tab, PO, Q4H, PRN

: Pain Score 7-10                     



                    oxyCODONE 5 mg immediate release: 5 mg, 1 tab, PO, Q4H, PRN:

 Pain Score 4-6                     



                    remove patch: 3 patch, TOP, Bedtime                     



                    tramadol: 100 mg, 2 tab, PO, Q6Hnow                     



                    vancomycin: 1,000 mg, IVPB, ABXQ8H                     



                    zinc sulfate: 220 mg, 1 cap, PO, Daily                     



                    Documented Medications                     



                    Documented                              



                    Percocet 10/325 oral tablet: 1 tab, PO, TID, 0 Refill(s),   

                  



                    Medications (19) Active                     



                    Scheduled: (14)                         



                    acetaminophen 500 mg TAB  1,000 mg 2 tab, PO, Q6Hnow        

             



                    ascorbic acid 500 mg TAB  500 mg 1 tab, PO, BID             

        



                    docusate sodium 100 mg CAP  100 mg 1 cap, PO, BID           

          



                    enoxaparin 40 mg/0.4 ml INJ  40 mg 0.4 mL, SUB-Q, [id number]  

                   



                    gabapentin 300 mg CAP  300 mg 1 cap, PO, Q8Hnow             

        



                    lidocaine 5% top patch  3 patch, TOP, Daily                 

    



                    lidocaine patch removal  3 patch, TOP, Bedtime              

       



                    methocarbamol 500 mg TAB  500 mg 1 tab, PO, TID             

        



                    multiple vit Therapeutic TAB  1 tab, PO, Daily              

       



                    piperacillin-tazobactam 3.375 gm INJ VL  3.375 gm, IVPB, ABX

Q6H                     



                    traMADol 50 mg TAB  100 mg 2 tab, PO, Q6Hnow                

     



                    vancomycin 1 gm INJ VL  1,000 mg, IVPB, ABXQ8H              

       



                                        whey protein isolate - soy lecithin (Arthur

eprotein) 7 gm pkt  2 pkt, PO, BID-

Before Meals                                        



                    zinc sulfate 220 mg (zinc elemental 50mg) CAP  220 mg 1 cap,

 PO, Daily                     



                    Continuous: (2)                         



                    sodium chloride 0.9% 1000 ml INJ 1,000 mL  1,000 mL, IV, 75 

ml/hr                     



                    sodium chloride 0.9% 1000 ml INJ 1,000 mL  1,000 mL, IV, 125

 ml/hr                     



                    PRN: (3)                                



                    oxyCODONE IR 5 mg TAB  5 mg 1 tab, PO, Q4H                  

   



                    oxyCODONE IR 5 mg TAB  10 mg 2 tab, PO, Q4H                 

    



                    sodium chloride 0.9% 10 ml flush syr BD  10 ml, IVP, PRN    

                 



                    Problem list:                           



                    All Problems                            



                    Acute pain / SNOMED CT 016404822 / Confirmed                

     



                    Headache / SNOMED CT 87749386 / Confirmed                   

  



                    Review of Systems                       



                    Constitutional                          



                    Cardiovascular                          



                    Ear/Nose/Mouth/Throat                     



                    Respiratory:  Negative.                     



                    Gastrointestinal:  Nausea.                     



                    Musculoskeletal                         



                    Neurologic:  headache,  shunt in place. +nausea.          

           



                    Psychiatric                             



                    Endocrine                               



                    Hematology/Lymphatics                     



                    Physical Examination                     



                    VS/Measurements                         



                    Measurements from flowsheet : Measurements                  

   



                    2018 10:20                        



                    Heparin Dosing Weight (kg)                     



                    86.33                                   



                    2018 09:00                        



                    Weight                                  



                    127.027 kg                              



                    Dosing Weight Difference Percent                     



                    -0.191 %                                



                    Dosing Weight Collection Method                     



                    Estimated                               



                    2018 00:44                        



                    Heparin Dosing Weight (kg)                     



                    86.43                                   



                    2018 00:43                        



                    Height                                  



                    162.56 cm                               



                    Height Collection Method                     



                    Estimated                               



                    Weight                                  



                    127.27 kg                               



                    Dosing Weight Difference Percent                     



                    -0.002 %                                



                    Dosing Weight Collection Method                     



                    Estimated                               



                    Body Surface Area                       



                    2.3973 m2                               



                    Body Mass Index                         



                    48.16 m2                                



                    2018 17:47                        



                    Heparin Dosing Weight (kg)                     



                    79.53                                   



                    2018 17:41                        



                    Height                                  



                    149.86 cm                               



                    Height Collection Method                     



                    Stated                                  



                    Weight                                  



                    127.273 kg                              



                    Dosing Weight Difference Percent                     



                    -2.439 %                                



                    Dosing Weight Collection Method                     



                    Measured                                



                    Body Surface Area                       



                    2.3018 m2                               



                    Body Mass Index                         



                    56.67 m2                                



                    ,                                       



                    Vital Signs (last 24 hrs)_____        Last Charted__________

_                     



                    Temp Oral        98.9 DegF  (MAY 04 19:52)                  

   



                    Heart Rate Peripheral        H 107bpm  (MAY 04 19:52)       

              



                    Resp Rate            17 BRMIN  (MAY 04 19:52)               

      



                    SBP        134 mmHg  (MAY 04 19:52)                     



                    DBP        81 mmHg  (MAY 04 19:52)                     



                    SpO2        94 %  (MAY 04 19:52)                     



                    Weight        127.02 kg  (MAY 04 09:00)                     



                    Height        162.56 cm  (MAY 04 00:43)                     



                    BMI        48.16  (MAY 04 00:43)                     



                    HENT:   shunt in situ, headache.                     



                    Review / Management                     



                    Results review:                         



                    Labs (Last four charted values)                     



                    WBC                     7.4    (MAY 04)    H 11.2    (MAY 03

)                     



                    Hgb                     15.8    (MAY 04)    16.4    (MAY 03)

                     



                    Hct                     48.9    (MAY 04)    49.8    (MAY 03)

                     



                    Plt                     397    (MAY 04)    408    (MAY 03)  

                   



                    Na                      139    (MAY 04)    140    (MAY 03)  

                   



                    K                       4.5    (MAY 04)    4.1    (MAY 03)  

                   



                    CO2                     L 21    (MAY 04)    L 21    (MAY 03)

                     



                    Cl                      107    (MAY 04)    109    (MAY 03)  

                   



                    Cr                      0.70    (MAY 04)    0.56    (MAY 03)

                     



                    BUN                     12    (MAY 04)    9    (MAY 03)     

                



                    Glucose Random          78    (MAY 04)    78    (MAY 03)    

                 



                    Mg                      2.3    (MAY 04)                     



                    Phos                    3.5    (MAY 04)                     



                    Ca                      9.1    (MAY 04)    L 7.8    (MAY 03)

                     



                    PT                      14.0    (MAY 04)    13.0    (MAY 03)

                     



                    INR                     1.08    (MAY 04)    0.98    (MAY 03)

                     



                    PTT                     H 37.6    (MAY 04)    33.2    (MAY 0

3)    .                     



                    Chest x-ray results                     



                    ECG interpretation                      



                    Impression and Plan                     



                    Diagnosis                               



                    Acute headache (DCP29-CC R51, Working, Medical).            

         



                    Course:  Worsening.                     



                    Orders                                  



                                        Education and Follow-up:       Counseled

: Patient, Regarding treatment, 

Regarding medications.                              



                    Professional Services                     



                                        32M hx CP,  shunt, chronic foot pressu

re ulcers. Consulted for management of 

acute headache.                                     



                                        - Necessary to work up etiology of heada

ches. Per primary team, no current 

neurosurgical intervention planned. Recommend neurology consult if primary team 
concerned for migraines per notes.                           



                    - initiated multimodal pain regimen.                     



                    - APMS will continue to follow                     



                    Cassie Traore MD PGY3                     



                    Addendum by Scott Menendez MD on 2018 09:19             

        



                                        I have personally evaluated this patient

 and discussed the plan of care with 

this resident. I have reviewed the note below and agree with the history, 
examination findings, assessment and plan.                           



                                                            



                    Extracted from:Title: History and Physical                  

   



                    Author: Romi Bai MD                     



                    Date: 18                            



                                                            



                                                            



                                        Patient is a 32-year-old with spina bifi

daand hydrocephalus treated by  shunt 

who presented to [facility] emergency room complaining of headache and blurry 
vision. He was there 2 days and transferredt                           



                                        o [facility] for higher level 

of care. Here he was evaluate by 

ophthalmology and neurosurgery. There is no surgical interventionwarranted at 
this time. He has evidence of UTI andpossibly infec                           



                                        tedheel and sacral decubiti that were pr

esent on admission. CRP is 15. We will 

need to treat with aggressive antibiotic regimen, wound care, and documentsthere
is no systemic infection beforeany CSF can                           



                                         be obtained from his  shunt. Wewill t

ry to avoid any IV narcotics,manage his 

pain with oral analgesicsas he is toleratingall his meals,and de-escalate his 
antibiotics as soon as possible.                           



                                                            



                    1.Acute headache                        



                                        Patient has headaches that may bedue to 

infection of his  system, however the 

differential also includes migraine, neuropathy and narcotics withdrawal.       

                    



                     2. (ventriculoperitoneal) shunt status                   

  



                    Appears to be functioning well and decompressing the ventric

les                     



                    We will avoid anyinterventionand access until systemic infec

tion is ruled out                     



                     3.Acute UTI                            



                    Broad-spectrum antibiotic coverage until systemic infection 

is ruled out                     



                     4.Decubitus ulcer                      



                    Present on admission                     



                    We will ask wound care to evaluate and prescribe treatment  

                   



                                                            



                                                            



                                                            



                      Lovenox                               



                      Home once etiology of headache is known                   

  



                    [facility] Hospitalist service is primary. Page [phone] c

oncerns.                     



                                                            



                                                            







Plan of Care







                                        No Data Provided for This Section







Social History







                    Social History      Date                Source



                                                            

 

                    Social History TypeResponse 2018          [facility]

o



                    Smoking Status                          



                                        Current every day smoker; Type: Cigarett

es; Lives with someone who smokes; 

Cigarette Smoking Last 365 Days Yes; Reg Smoking Cessation Counseling No        

                   



                    entered on: 18                     



                                                            

 

                    Social History TypeResponse 2018          [facility]



                    Smoking Status                          



                                        Current every day smoker; Type: Cigarett

es; Lives with someone who smokes; 

Cigarette Smoking Last 365 Days Yes; Reg Smoking Cessation Counseling No        

                   



                    entered on: 18                     



                                                            







Family History







                                        No Data Provided for This Section







Advance Directives







                                        No Data Provided for This Section







Functional Status







                                        No Data Provided for This Section

## 2021-01-15 ENCOUNTER — HOSPITAL ENCOUNTER (EMERGENCY)
Dept: HOSPITAL 97 - ER | Age: 36
Discharge: HOME | End: 2021-01-15
Payer: COMMERCIAL

## 2021-01-15 VITALS — SYSTOLIC BLOOD PRESSURE: 131 MMHG | DIASTOLIC BLOOD PRESSURE: 70 MMHG | OXYGEN SATURATION: 99 %

## 2021-01-15 VITALS — TEMPERATURE: 98 F

## 2021-01-15 DIAGNOSIS — G80.9: ICD-10-CM

## 2021-01-15 DIAGNOSIS — I10: ICD-10-CM

## 2021-01-15 DIAGNOSIS — Z88.1: ICD-10-CM

## 2021-01-15 DIAGNOSIS — T83.038A: Primary | ICD-10-CM

## 2021-01-15 DIAGNOSIS — F17.210: ICD-10-CM

## 2021-01-15 DIAGNOSIS — Z88.0: ICD-10-CM

## 2021-01-15 DIAGNOSIS — Z98.2: ICD-10-CM

## 2021-01-15 DIAGNOSIS — Z88.3: ICD-10-CM

## 2021-01-15 DIAGNOSIS — Z88.8: ICD-10-CM

## 2021-01-15 DIAGNOSIS — Q05.9: ICD-10-CM

## 2021-01-15 DIAGNOSIS — Z88.5: ICD-10-CM

## 2021-01-15 PROCEDURE — 96372 THER/PROPH/DIAG INJ SC/IM: CPT

## 2021-01-15 PROCEDURE — 72170 X-RAY EXAM OF PELVIS: CPT

## 2021-01-15 PROCEDURE — 0T2BX0Z CHANGE DRAINAGE DEVICE IN BLADDER, EXTERNAL APPROACH: ICD-10-PCS

## 2021-01-15 PROCEDURE — 99283 EMERGENCY DEPT VISIT LOW MDM: CPT

## 2021-01-15 PROCEDURE — 51702 INSERT TEMP BLADDER CATH: CPT

## 2021-01-15 NOTE — ER
Nurse's Notes                                                                                     

 [facility]                                                                 

Name: Redd Dale Jr                                                                            

Age: 35 yrs                                                                                       

Sex: Male                                                                                         

: 1985                                                                                   

MRN: [id number]                                                                                   

Arrival Date: 01/15/2021                                                                          

Time: 15:18                                                                                       

Account#: [id number]                                                                            

Bed 5                                                                                             

Private MD:                                                                                       

Diagnosis: Right lateral flank pain, Suprapubic Ortega replacement                                 

                                                                                                  

Presentation:                                                                                     

01/15                                                                                             

15:18 Chief complaint: Patient states: Suprapubic catheter site leaking for 1 week. Seen here ll1 

      yesterday, we didn't have a suprapubic cath yesterday. He brought one from home today.      

      + foul smell to urine, low back pain for 2 days. No fever. Coronavirus screen: Client       

      denies travel out of the [place] in the last 14 days. At this time, the client does not        

      indicate any symptoms associated with coronavirus-19. Ebola Screen: Patient denies          

      travel to an Ebola-affected area in the 21 days before illness onset. Initial Sepsis        

      Screen: Does the patient meet any 2 criteria? HR > 90 bpm. No. Patient's initial sepsis     

      screen is negative. Does the patient have a suspected source of infection? Yes:             

      Dysuria/Frequency/Urgency/UTI. Risk Assessment: Do you want to hurt yourself or someone     

      else? Patient reports no desire to harm self or others. Onset of symptoms was 2021.                                                                                   

15:18 Method Of Arrival: Wheelchair                                                           ll1 

15:18 Acuity: AYESHA 3                                                                           ll1 

                                                                                                  

Historical:                                                                                       

- Allergies:                                                                                      

15:21 Amoxicillin;                                                                            ll1 

15:21 Bactrim;                                                                                ll1 

15:21 Ciprofloxacin;                                                                          ll1 

15:21 CLAVULANIC ACID;                                                                        ll1 

15:21 Demerol;                                                                                ll1 

15:21 Doxycycline;                                                                            ll1 

15:21 Levofloxacin;                                                                           ll1 

15:21 Morphine;                                                                               ll1 

15:21 PENICILLINS;                                                                            ll1 

15:21 Toradol;                                                                                ll1 

15:21 TRIMETHOPRIM;                                                                           ll1 

15:21 Vancomycin;                                                                             ll1 

15:21 Zofran;                                                                                 ll1 

- PMHx:                                                                                           

15:21 Asthma; Cerebral Palsy; cluster headaches; decubitus ulcers on feet; GERD;              ll1 

      Hydrocephalus; Hypertension; spina bifida;                                                  

- PSHx:                                                                                           

15:21  shunt; Heel Surgery L; Cholecystectomy;                                              ll1 

                                                                                                  

- Immunization history:: Flu vaccine is up to date.                                               

- Social history:: Smoking status: Patient reports the use of cigarette tobacco                   

  products, smokes one-half pack cigarettes per day.                                              

                                                                                                  

                                                                                                  

Screenin:10 Abuse screen: Denies threats or abuse. Denies injuries from another. Nutritional        sv  

      screening: No deficits noted. Tuberculosis screening: No symptoms or risk factors           

      identified. Fall Risk No fall in past 12 months (0 pts). Secondary diagnosis (15            

      points) impaired mobility, No IV (0 pts). Ambulatory Aid- None/Bed Rest/Nurse Assist (0     

      pts). Gait- Normal/Bed Rest/Wheelchair (0 pts) Mental Status- Oriented to own ability       

      (0 pts). Total Kim Fall Scale indicates No Risk (0-24 pts).                               

                                                                                                  

Assessment:                                                                                       

17:10 General: Appears in no apparent distress. uncomfortable, obese, well developed,         sv  

      Behavior is calm, cooperative, appropriate for age. Pain: Complains of pain in back         

      Pain currently is 7 out of 10 on a pain scale. Neuro: Level of Consciousness is awake,      

      alert, obeys commands, Oriented to person, place, time, situation, Paralysis in             

      bilateral leg(s). Respiratory: Respiratory effort is even, unlabored, Respiratory           

      pattern is regular, symmetrical. : suprapubic catheter in place to gravity drainage.      

      Derm: Skin is pink, warm \T\ dry.                                                           

18:04 Reassessment: Dr Lerner at bedside to place new suprapubic catheter.                   sv  

18:28 Reassessment: Patient appears in no apparent distress at this time. No changes from     sv  

      previously documented assessment. Patient and/or family updated on plan of care and         

      expected duration. Pain level reassessed. Patient is alert, oriented x 3, equal             

      unlabored respirations, skin warm/dry/pink.                                                 

19:30 General: Appears in no apparent distress. comfortable, Behavior is calm, cooperative,   rr5 

      appropriate for age, for discharge awaiting for his ride. Neuro: Level of Consciousness     

      is awake, alert, Oriented to person, place, time. Cardiovascular: Capillary refill < 3      

      seconds Patient's skin is warm and dry. Respiratory: Airway is patent Respiratory           

      effort is even, unlabored, Respiratory pattern is regular, symmetrical.                     

20:04 Reassessment: Patient appears in no apparent distress at this time. Patient is alert,   rr5 

      oriented x 3, equal unlabored respirations, skin warm/dry/pink. discharge instruction       

      given and explained without complaints made.                                                

                                                                                                  

Vital Signs:                                                                                      

15:18  / 80; Pulse 95; Resp 18; Temp 98.0; Pulse Ox 92% on R/A; Weight 172.37 kg;       ll1 

      Height 4 ft. 11 in. (149.86 cm); Pain 7/10;                                                 

20:00  / 70; Pulse 90; Resp 16; Pulse Ox 99% ;                                          rr5 

15:18 Body Mass Index 76.75 (172.37 kg, 149.86 cm)                                            ll1 

                                                                                                  

ED Course:                                                                                        

15:18 Patient arrived in ED.                                                                  ll1 

15:20 Triage completed.                                                                       ll1 

15:21 Arm band placed on.                                                                     ll1 

16:45 Judy Deutsch, RN is Primary Nurse.                                                  sv  

16:57 Juan Luis Lerner MD is Attending Physician.                                              kdr 

17:10 Patient has correct armband on for positive identification. Bed in low position. Call   sv  

      light in reach. Side rails up X2.                                                           

17:22 Door closed. Lights dimmed. Warm blanket given.                                         sv  

18:31 X-ray(s) taken.                                                                         sv  

19:01 Pelvis In Process Unspecified.                                                          EDMS

19:15 Primary Nurse role handed off by Judy Deutsch, RN                                   sv  

20:03 No provider procedures requiring assistance completed. Patient did not have IV access   rr5 

      during this emergency room visit.                                                           

                                                                                                  

Administered Medications:                                                                         

17:15 Drug: Dilaudid 1 mg {Note: rass1.} Route: IM; Site: right deltoid;                      sv  

18:28 Follow up: Response: No adverse reaction; No change in condition; RASS: Restless (+1)   sv  

17:15 Drug: Phenergan 25 mg Route: IM; Site: right deltoid;                                   sv  

18:28 Follow up: Response: No adverse reaction                                                sv  

18:24 CANCELLED (change to IM per ): Dilaudid 1 mg IVP once; RASS on ADMIN:         hb  

      Combtv4, Very Agttd3, Agttd2, Rstlss1, AlertClm0, Drwsy-1, Lt Sdtn-2, Mod Sdtn-3, Dp        

      Sdtn-4, UnArsble-5                                                                          

18:28 Drug: Dilaudid 1 mg {Note: rass1.} Route: IM; Site: left deltoid;                       sv  

19:21 Follow up: Response: No adverse reaction                                                mg2 

19:20 Drug: Macrobid 100 mg Route: PO;                                                        mg2 

19:20 Follow up: Response: No adverse reaction; Medication administered at discharge.         mg2 

                                                                                                  

                                                                                                  

Outcome:                                                                                          

18:58 Discharge ordered by MD.                                                                kdr 

20:03 Discharged to home via wheelchair.                                                      rr5 

20:03 Condition: stable                                                                           

20:03 Discharge instructions given to patient, Instructed on discharge instructions, follow       

      up and referral plans. medication usage, Demonstrated understanding of instructions,        

      follow-up care, medications, Prescriptions given X 1.                                       

20:04 Patient left the ED.                                                                    mg2 

                                                                                                  

Signatures:                                                                                       

Dispatcher [employer]                           EDMS                                                 

Judy Deutsch RN                    RN                                                      

Juan Luis Lerner MD MD   kdr                                                  

Miguel Skaggs RN                    RN   mg2                                                  

Diego Wagner RN                      RN   rr5                                                  

Mark Esquivel RN                       RN   ll1                                                  

Josefa Bryson RN                                                      

                                                                                                  

**************************************************************************************************

## 2021-01-15 NOTE — RAD REPORT
EXAM DESCRIPTION:  RAD - Pelvis - 1/15/2021 7:00 pm

 

CLINICAL HISTORY:  infuse gastrgraffin to check suprapubic contreras placement

Pelvic pain

 

COMPARISON:  Pelvis dated 10/20/2019

 

FINDINGS:  No fluoroscopy was performed. Two plain radiographs of the pelvis were obtained. Contrast 
was injected into the suprapubic catheter and appears to fill the bladder.

## 2021-01-15 NOTE — XMS REPORT
Continuity of Care Document

                           Created on:January 15, 2021



Patient:JORDAN VERDIN JR

Sex:Male

:1985

External Reference #:[id number]





Demographics







                          Address                   [address] [place] [id number]

 

                          Home Phone                [phone]

 

                          Phone                     [phone]

 

                          Preferred Language        en-US

 

                          Marital Status            Unknown

 

                          Jew Affiliation     Unknown

 

                          Race                      Unknown

 

                          Ethnic Group              Unknown









Author







                          Organization              [facility]









Care Team Providers







                    Name                Role                Phone

 

                    [facility] Unavailable         Un

available









Problems







          Problem   Status    Onset     Classification Date      Comments  Sourc

e



                              Date                Reported            



                                                                      



                                                                      



                                                                      

 

          Headache            20           [address]



                                                                      



                                                                      

 

          HEADACHE  Active    20                                [address]



                                                                      



                                                                      

 

          SHUNT MALFUNCTION Active    20                                [address]



                                                                      



                                                                      

 

          ACUTE HEADACHE Active    20                                [address]



                                                                      



                                                                      

 

          Acute pain Active              Problem   2019           [facility]



          (finding)                                                   [facility]



                                                                      



                                                                      

 

          Asthma (disorder) Resolved            Problem   2019           [facility]

[facility]



                                                                      Neuro,[facility]



                                                                      



                                                                      

 

          Bronchitis Resolved            Problem   2019           [facility]



          (disorder)                                                   [facility]uro,[facility]



                                                                      



                                                                      

 

          Cerebral palsy Resolved            Problem   2019           [facility]

[facility]



          (disorder)                                                   Neuro,[facility]



                                                                      



                                                                      

 

          Headache  Active              Problem   2019           [facility]



          (finding)                                                   [facility]



                                                                      



                                                                      

 

          Hydrocephalus Resolved            Problem   2019           [facility]

er



          (disorder)                                                   Neuro,[facility]



                                                                      



                                                                      

 

          Osteomyelitis Resolved            Problem   2019           [facility]

[facility]



          (disorder)                                                   Neuro,[facility]



                                                                      



                                                                      

 

          Spina bifida,                               2019           [facility]



                                                                      



                                                                      

 

          Nausea with                               2019            [place]

s



          vomiting,                                                   Medical



          unspecified                                                   Center



                                                                      



                                                                      

 

          Diplopia                                2019           [facility]



                                                                      



                                                                      

 

          Cerebral palsy,                               2019           [facility]



                                                                      



                                                                      

 

          Acquired absence                               2019           [facility]



          of other                                                    Medical



          specified parts                                                   Cent

er



          of digestive                                                   



          tract                                                       



                                                                      

 

          Nicotine                                2019           [facility]



          dependence,                                                   Medical



          cigarettes,                                                   Center



          uncomplicated                                                   



                                                                      



                                                                      

 

          Presence of                               2019            [facility]

s



          cerebrospinal                                                   Medica

l



          fluid drainage                                                   [facility]

r



          device                                                      



                                                                      

 

          Allergy status to                               2019           [facility]



          other antibiotic                                                   Med

ical



          agents status                                                   Center



                                                                      



                                                                      

 

          Allergy status to                               2019           [facility]



          other drugs,                                                   Medical



          medicaments and                                                   Cent

er



          biological                                                   



          substances status                                                   



                                                                      



                                                                      

 

          Allergy status to                               2019           [facility]



          narcotic agent                                                   Medic

al



          status                                                      Center



                                                                      



                                                                      

 

          HEADACHE  Active                                            [facility]



                                                                      



                                                                      







Medications







        Medication Details Route   Status  Patient Ordering Order   Source



                                        Instructions Provider Date    



                                                                



                                                                



                                                                

 

        normal saline 1,000 mL, Rate:         Inactive                 

[facility] [place]



        0.9% IV 1,000 100 ml/hr,                                 2018    Medical



        mL      Infuse over: 10                                         Center



                hr, Route: IV,                                         



                Dosing Weight                                         



                131.818 kg,                                         



                Total Volume:                                         



                1,000, Start                                         



                date: 18                                         



                5:04:00 CST,                                         



                Duration: 30                                         



                day, Stop date:                                         



                18                                         



                5:03:00 CST,                                         



                2.4, m2                                         



                                                                

 

        Magnesium Notes: WASTE:         Inactive                  [facility]

s



        Sulfate F/P - Sink; E -                                 2018    Medical



                Municipal Trash                                         [place]



                Bin                                             



                                                                

 

        Isolyte S Notes: (Same         Inactive                  [place]



        PH-7.4 (Bolus) as: [facility] S                                     Med

ical



        IV      PH 7.4)                                         Center



                                                                



                                                                

 

        Phenergan 12.5 mg, 0.5         Inactive                  [place]



                mL, Route:                                 2018    Medical



                IVPB, Drug                                         Center



                form: INJ,                                         



                ONCE, Dosing                                         



                Weight 131.818,                                         



                kg, Priority:                                         



                STAT, Start                                         



                date: 18                                         



                4:35:00 CST,                                         



                Stop date:                                         



                18                                         



                4:35:00 CST                                         



                                                                



                                                                

 

        Docusate Sodium 100 mg = 1 cap,         Active                    

 [place]



        100 MG Oral PO, BID, 0                                 2018    Medical



        Capsule Refill(s)                                         [place]



                                                                



                                                                

 

        Zosyn   0 Refill(s)         Active                   [place]



                                                        2018    Medical



                                                                [place]



                                                                



                                                                

 

        celecoxib 200 200 mg = 1 cap,         Active                    

 [place]



        mg oral capsule PO, BID, 0                                 2018    Medic

al



                Refill(s)                                         [place]



                                                                



                                                                

 

        ascorbic acid 500 mg = 1 tab,         Active                    [facility]



                PO, BID, 0                                 2018    Medical



                Refill(s)                                         [place]



                                                                



                                                                

 

        acetaminophen 1,000 mg = 2         Active                     [facility]

[place]



        500 mg oral tab, PO,                                 2018    Medical



        tablet  Q6Hnow, 0                                         Center



                Refill(s)                                         



                                                                



                                                                

 

        Oxycodone 5 mg = 1 tab,         Active                    [facility] [place]



        Hydrochloride 5 PO, Q4H, PRN                                 2018    Med

ical



        MG Oral Tablet Pain Score 4-6,                                         C

enter



                0 Refill(s)                                         



                                                                



                                                                

 

        zinc sulfate 220 mg = 1 cap,         Active                     

[place]



        220 mg oral PO, Daily, 0                                 2018    Medical



        capsule Refill(s)                                         [place]



                                                                



                                                                

 

        multivitamin 1 tab, PO,         Active                    [facility]



                Daily, 0                                 2018    Medical



                Refill(s)                                         Center



                                                                



                                                                

 

        methocarbamol 1,000 mg = 2         Active                     [facility]

[place]



        500 mg oral tab, PO, Q8H, 0                                 2018    Medi

[facility]



        tablet  Refill(s)                                         Center



                                                                



                                                                

 

        LORazepam 0.5 0.5 mg = 1 tab,         Active                    

 [place]



        mg oral tablet PO, Q8H, PRN                                 2018    Medi

[facility]



                Anxiety, 0                                         Center



                Refill(s)                                         



                                                                



                                                                

 

        Lidocaine 3 patch, TOP,         Active                     [place]



        Hydrochloride Daily, Remove                                 2018    Medi

[facility]



        0.05 MG/MG after 12 hours,                                         [facility]

r



        Transdermal 0 Refill(s)                                         



        Patch                                                   



        [Lidoderm]                                                 



                                                                



                                                                

 

        Robaxin Notes: (Same         No Longer                    [place]



                as:Robaxin)         Active                  2018    Medical



                                                                Center



                                                                



                                                                

 

        Oxycodone Notes: (Same         No Longer                    [place]

s



        Hydrochloride 5 as: Roxicodone)         Active                  2018    

Medical



        MG Oral Tablet                                                 Center



                                                                



                                                                

 

        Ativan  Notes: (Same         No Longer                    [place]



                as: Ativan)         Active                  2018    Medical



                                                                Center



                                                                



                                                                

 

        Trazodone Notes: (Same         No Longer                    [place]

s



        Hydrochloride As: Desyrel)         Active                  2018    Medic

al



        50 MG Oral                                                 Center



        Tablet                                                  



                                                                

 

        remove patch Notes: Remove         No Longer                    

[place]



                patch 12 hours         Active                  2018    Medical



                after                                           Center



                application                                         



                each day.                                         



                                                                



                                                                

 

        Oxycodone Notes: (Same         Inactive                    [place]



        Hydrochloride 5 as: Roxicodone)                                 2018    

Medical



        MG Oral Tablet                                                 Center



                                                                



                                                                

 

        Celebrex Notes: NSAID.         No Longer                    [place]

s



                Please check         Active                  2018    Medical



                indication. Not                                         Center



                for seizure.                                         



                (Same As:                                         



                CeleBREX)                                         



                                                                



                                                                

 

        Vancomycin  2001 mg:         No Longer                    [place]



                infuse over 2.5         Active                  2018    Medical



                hours                                           Center



                                                                



                                                                

 

        Lidocaine Notes: Apply         No Longer                    [place]

s



        Hydrochloride only once for         Active                  2018    Medi

[facility]



        0.05 MG/MG up to 12 hours                                         Center



        Transdermal in a 24-hour                                         



        Patch   period (12                                         



        [Lidoderm] hours on and 12                                         



                hours off).                                         



                (Same as:                                         



                Lidoderm)                                         



                "Remove old                                         



                patch before                                         



                application of                                         



                new patch"                                         



                                                                



                                                                

 

        Phenergan Notes: Do not         Inactive                    [place]

s



                give IV push.                                 2018    Medical



                (Same as:                                         Center



                Phenergan)                                         



                                                                



                                                                

 

        Dilaudid Notes: Same as         Inactive                    [place]

s



                Dilaudid                                 2018    Medical



                                                                Center



                                                                



                                                                

 

        Tramadol Notes: Not to         No Longer                    [place]

s



                exceed          Active                  2018    Medical



                400mg/day.                                         Center



                (Same As:                                         



                Ultram)                                         



                                                                

 

        gabapentin Notes: (Same         No Longer                    [facility]

as



                as: Neurontin)         Active                  2018    Medical



                                                                Center



                                                                



                                                                

 

        Acetaminophen Notes: Max         No Longer                    [facility]

[place]



                acetaminophen         Active                  2018    Medical



                4000 mg/day (4                                         Center



                gm/day).  (Same                                         



                as: Tylenol                                         



                Extra Strength)                                         



                                                                



                                                                

 

        Robaxin Notes: (Same         No Longer                    [place]



                as:Robaxin)         Active                  2018    Medical



                                                                Center



                                                                



                                                                

 

        Oxycodone Notes: (Same         No Longer                    [place]

s



        Hydrochloride 5 as: Roxicodone)         Active                  2018    

Medical



        MG Oral Tablet                                                 Center



                                                                



                                                                

 

        Beneprotein 7 Notes: (Same         No Longer                   [facility]



        gm pkt  as:             Active                      Medical



                Beneprotein)                                         [place]



                                                                



                                                                

 

        Acetaminophen 1 tab, PO, TID,         No Longer                   

 [place]



        325 MG / 0 Refill(s)         Active                  2018    Medical



        Oxycodone                                                 [place]



        Hydrochloride                                                 



        10 MG Oral                                                 



        Tablet                                                  



        [Percocet                                                 



        10/325]                                                 



                                                                

 

        Dilaudid 0.5 mg, 0.25         Inactive                    [place]



                mL, Route: IVP,                                     Medical



                Drug form: INJ,                                         Center



                ONCE, Start                                         



                date: 18                                         



                11:01:00 CDT,                                         



                Stop date:                                         



                18                                         



                11:01:00 CDT                                         



                                                                



                                                                

 

        Phenergan Notes: (Same         Inactive                   [facility]



                as: Phenergan)                                 2018    Medical



                                                                Center



                                                                



                                                                

 

        Dilaudid 2 mg, Route:         Inactive                    [place]



                IVP, ONCE,                                 2018    Medical



                Dosing Weight                                         Center



                127.027, kg,                                         



                Priority: STAT,                                         



                Start date:                                         



                18                                         



                10:43:00 CDT,                                         



                Stop date:                                         



                18                                         



                10:43:00 CDT                                         



                                                                



                                                                

 

        Docusate Notes: (Same         No Longer                    [place]



                as: Colace) (Do         Active                      Medical



                Not Crush)                                         Center



                                                                



                                                                

 

        Zinc Sulfate Notes: (Zinc         No Longer                    [place]

[place]



                sulfate         Active                      Medical



                capsule) - 220                                         Center



                mg Zinc sulfate                                         



                = 50 mg                                         



                elemental zinc                                         



                Same as Zinc                                         



                Sulfate                                         



                                                                

 

        ascorbic acid Notes: (Same         No Longer                    

[place]



                as: Vitamin C)         Active                  2018    Medical



                                                                Center



                                                                



                                                                

 

        multivitamin Notes: (Same         No Longer                    [place]

[place]



                as:Thera)         Active                  2018    Medical



                WASTE: F/P -                                         Center



                Black; E -                                         



                Municipal Trash                                         



                Bin  Take with                                         



                food.                                           



                                                                

 

        Naproxen Notes: (Same         Inactive                    [place]



                as: Naprosyn)                                 2018    Medical



                Take with food.                                         Center



                                                                



                                                                

 

        Zosyn   Notes: (Same         No Longer                    [place]



                as: Zosyn)         Active                  2018    Medical



                Dosing based on                                         Center



                Piperacillin                                         



                component   ***                                         



                MEDICATION                                         



                WASTE ***                                         



                Product Size:                                         



                3375 mg Product                                         



                Wasted:  ___ mg                                         



                                                                



                                                                

 

        Vancomycin  2001 mg:         No Longer                    [place]



                infuse over 2.5         Active                  2018    Medical



                hours  For                                         [place]



                adult patients                                         



                only: Round to                                         



                nearest 250 mg                                         



                per Medical                                         



                Staff approval                                         



                 *** MEDICATION                                         



                WASTE ***                                         



                Product Size:                                         



                1000 mg Product                                         



                Wasted:  ___ mg                                         



                                                                



                                                                

 

        Enoxaparin Notes: (Same         No Longer                    [place]

as



                as: Lovenox)         Active                  2018    [facility]



                                                                



                                                                

 

        Sodium Chloride 1,000 mL, Rate:         No Longer                 

   [place]



        0.9% IV 1,000 125 ml/hr,         Active                  2018    Medical



        mL      Infuse over: 8                                         Center



                hr, Route: IV,                                         



                Dosing Weight                                         



                127.27 kg,                                         



                Total Volume:                                         



                1,000, Start                                         



                date: 18                                         



                1:46:00 CDT,                                         



                Duration: 30                                         



                day, Stop date:                                         



                18                                         



                1:45:00 CDT,                                         



                2.44, m2                                         



                                                                

 

        Saline Flush Notes: (Same         No Longer                    [place]

[place]



        0.9%    as: BD          Active                  2018    Medical



                Posiflush)                                         Center



                                                                



                                                                

 

        Acetaminophen Notes: Do not         Inactive                    

[place]



                exceed 4                                 2018    Medical



                gm/day.  (Same                                         Center



                as: Tylenol)                                         



                                                                



                                                                

 

        Acetaminophen Notes: (Same         Inactive                    [place]

[place]



        325 MG / as: Norco                                 2018    Medical



        Hydrocodone 325/5)  Do not                                         [place]

r



        Bitartrate 5 MG exceed 4gm/day                                         



        Oral Tablet of                                              



                acetaminophen.                                         



                                                                



                                                                

 

        Reglan  Notes: (Same         Inactive                    [place]



                as: Reglan)                                 2018    Medical



                                                                Center



                                                                



                                                                

 

        Benadryl Notes: (Same         Inactive                    [place]



                as: Benadryl)                                 2018    [facility]



                                                                



                                                                

 

        Magnesium Notes: WASTE:         Inactive                    [place]

s



        Sulfate F/P - Sink; E -                                 2018    Medical



                Municipal Trash                                         Center



                Bin                                             



                                                                

 

        Sodium Chloride 1,000 mL, 1000         Inactive                   

 [place]



        0.9% (Bolus) IV ml/hr, Infuse                                 2018    Me

dical



                Over: 1 hr,                                         Center



                Route: IV,                                         



                1,000, Drug                                         



                form: INJ,                                         



                ONCE, Priority:                                         



                STAT, Dosing                                         



                Weight 127.273                                         



                kg, Start date:                                         



                18                                         



                23:26:00 CDT,                                         



                Stop date:                                         



                18                                         



                23:26:00 CDT                                         



                                                                



                                                                

 

        Zosyn   4.5 gm, Route:         Inactive                 [facility]



                IVPB, ONCE,                                 2018    Medical



                Dosing Weight                                         Center



                127.273, kg,                                         



                Priority: STAT,                                         



                Start date:                                         



                18                                         



                23:10:00 CDT,                                         



                Stop date:                                         



                18                                         



                23:10:00 CDT,                                         



                ABX Indication:                                         



                Bacteremia                                         



                                                                



                                                                

 

        Vancomycin  2001 mg:         Inactive                   [facility]



                infuse over 2.5                                 2018    Medical



                hours  For                                         Center



                adult patients                                         



                only: Round to                                         



                nearest 250 mg                                         



                per Medical                                         



                Staff approval                                         



                 *** MEDICATION                                         



                WASTE ***                                         



                Product Size:                                         



                1000 mg Product                                         



                Wasted:  ___ mg                                         



                                                                



                                                                

 

        normal saline 1,000 mL, Rate:         No Longer                   

[facility] [place]



        0.9% IV 1,000 75 ml/hr,         Active                  2018    Medical



        mL      Infuse over:                                         Center



                13.3 hr, Route:                                         



                IV, Dosing                                         



                Weight 127.273                                         



                kg, Total                                         



                Volume: 1,000,                                         



                Start date:                                         



                18                                         



                22:39:00 CDT,                                         



                Duration: 30                                         



                day, Stop date:                                         



                18                                         



                22:38:00 CDT,                                         



                2.36, m2                                         



                                                                

 

        Acetaminophen Notes: Max         Inactive                 [facility]

as



                acetaminophen                                 2018    Medical



                4000 mg/day (4                                         Center



                gm/day).  (Same                                         



                as: Tylenol                                         



                Extra Strength)                                         



                                                                



                                                                

 

        Rocephin Notes: (Same         Inactive                 [facility]



                As: Rocephin).                                 2018    Medical



                 *** MEDICATION                                         Center



                WASTE ***                                         



                Product Size:                                         



                1000 mg Product                                         



                Wasted:  _0__                                         



                mg                                              



                                                                

 

        Morphine 4 mg, Route:         Inactive                 [facility]



                IVP, ONCE,                                 2018    Medical



                Dosing Weight                                         Center



                127.273, kg,                                         



                Priority: STAT,                                         



                Start date:                                         



                18                                         



                19:05:00 CDT,                                         



                Stop date:                                         



                18                                         



                19:05:00 CDT                                         



                                                                



                                                                

 

        Benadryl Notes: (Same         Inactive                 [facility]



                as: Benadryl)                                 2018    Medical



                                                                Center



                                                                



                                                                

 

        Benadryl Notes: (Same         Inactive                 [facility]



                as: Benadryl)                                 2018    Medical



                                                                Center



                                                                



                                                                

 

        Morphine 4 mg, 1 mL,         Inactive                 /  [facility] [place]



                Route: IVP,                                 2018    Medical



                Drug form:                                         Center



                SOLN, ONCE,                                         



                Dosing Weight                                         



                127.273, kg,                                         



                Priority: STAT,                                         



                Start date:                                         



                18                                         



                17:56:00 CDT,                                         



                Stop date:                                         



                18                                         



                17:56:00 CDT                                         



                                                                



                                                                







Allergies, Adverse Reactions, Alerts







        Substance Category Reaction Severity Reaction Status  Date    Comments S

ource



                                        type            Reported         



                                                                        



                                                                        



                                                                        

 

        amoxicillin Assertion                 Drug    Active                  [facility]

[facility]



                                        allergy                         [facility]



                                                                        



                                                                        



                                                                        

 

        morphine Assertion                 Drug    Active                  [facility]

er



                                        allergy                         [facility]



                                                                        



                                                                        



                                                                        

 

        Toradol Assertion                 Drug    Active                  [facility]

r



                                        allergy                         [facility]



                                                                        



                                                                        



                                                                        

 

        Minocin Assertion                 Drug    Active                  [facility]

r



                                        allergy                         [facility]



                                                                        



                                                                        



                                                                        

 

        Zofran  Assertion                 Drug    Active                  [facility]

r



                                        allergy                         Neuro



                                                                        



                                                                        



                                                                        

 

        Levaquin Assertion                 Drug    Active                  [facility]

er



                                        allergy                         Neuro



                                                                        



                                                                        



                                                                        

 

        Bactrim Assertion                 Drug    Active                  [facility]

r



                                        allergy                         Neuro



                                                                        



                                                                        



                                                                        







Immunizations







                                        No Data Provided for This Section







Results







        Order Name Results Value   Reference Date    Interpretation Comments Yoko

rce



                                Range                           



                                                                



                                                                



                                                                

 

        ERTAPENEM:S Culture: >100,000 CFU/mL Proteus mirabilis             

               [facility]:PT:ISOL Urine   10,000 -  50,000 CFU/mL Skin Mini         /    

               Medical



        ATE:ORDQN:M                                                 [place]



        IC                                                      



                                                                



                                                                

 

        ERTAPENEM:S Prote[employer] Proteus                            [facility]:PT:ISOL mirabilis mirabilis         /                   Medical



        ATE:ORDQN:M                                                 Center



        IC                                                      



                                                                



                                                                

 

        URINE AND UA Nitrite Negative Negative                    [facility]



        STOOL           (18 10:48 AM)         /                   Medic

al



                                                                Center



                                                                



                                                                



                                                                

 

        URINE AND UA Bili Negative Negative                    [facility]



        STOOL           *NA*            /                   Medical



                        (18 10:48 AM)                                 [facility]

r



                                                                



                                                                



                                                                

 

        URINE AND UA Ketones Negative Negative                    [facility]



        STOOL           *NA*            /                   Medical



                        (18 10:48 AM)                                 [place]

r



                                                                



                                                                



                                                                

 

        URINE AND UA Blood Trace   Negative                    [facility]



        STOOL           *ABN*           /                   Medical



                        (18 10:48 AM)                                 [place]

r



                                                                



                                                                



                                                                

 

        URINE AND UA      0.2     0.1 - 1.0                    [facility]



        STOOL   Urobilinogen                 /                   Medical



                                                                Center



                                                                



                                                                



                                                                

 

        URINE AND UA Leuk Est Large   Negative                    [facility]



        STOOL           *ABN*           /                   Medical



                        (18 10:48 AM)                                 [facility]

r



                                                                



                                                                



                                                                

 

        URINE AND UA Protein Negative Negative                    [facility]



        STOOL           (18 10:48 AM)         /                   Medic

al



                                                                Center



                                                                



                                                                



                                                                

 

        URINE AND UA Glucose Negative Negative                    [facility]



        STOOL           (18 10:48 AM)         /2018                   [facility]



                                                                



                                                                



                                                                

 

        URINE AND UA pH   7.0     5.0 - 8.0                    [facility]                                              [facility]



                                                                



                                                                



                                                                

 

        URINE AND UA Spec Grav 1.015   <=1.030                    [facility]                                              [facility]



                                                                



                                                                



                                                                

 

        URINE AND UA Color Yellow  Yellow                     [facility]



        STOOL           *NA*            /                   Medical



                        (18 10:48 AM)                                 [place]

r



                                                                



                                                                



                                                                

 

        URINE AND UA Turbidity Clear   Clear                      [facility]           (18 10:48 AM)                            [facility]



                                                                



                                                                



                                                                

 

        URINE AND UA Mucus Few /LPF None Seen                    [facility]                   /LPF    /                   [facility]



                                                                



                                                                



                                                                

 

        URINE AND UA Bacteria Few /HPF None Seen                    [facility]

s



        STOOL                   /HPF                       [facility]



                                                                



                                                                



                                                                

 

        URINE AND UA RBC  0-2 /HPF 0 - 2                      [facility]                                              [facility]



                                                                



                                                                



                                                                

 

        URINE AND UA Sq Epi None Seen Few                        [facility]           (18 10:48 AM)                            [facility]



                                                                



                                                                



                                                                

 

        URINE AND UA WBC    None Seen                    [facility]           /HPF    /HPF                       [facility]



                                                                



                                                                



                                                                

 

        BLOOD BANK Antibody Scrn Negative                             [facility]

as



        RESULTS         (18 5:57 AM)                            [facility]



                                                                



                                                                



                                                                

 

        BLOOD BANK ABO/Rh  AB POS                             [facility] [place]



        RESULTS                         /                   [facility]



                                                                



                                                                



                                                                

 

        HEMATOLOGY PTT     33.4    22.9 -                      [place]



                                35.8                       [facility]



                                                                



                                                                



                                                                

 

        HEMATOLOGY PT      13.7    12.0 -                     [facility] [place]



                                14.7                       [facility]



                                                                



                                                                



                                                                

 

        HEMATOLOGY INR     1.05    0.85 -                     [facility] [place]



                                1.17                       [facility]



                                                                



                                                                



                                                                

 

        CHEM PANEL eGFR    133                        Result  [facility] [place]



                                                   Comment: The Medical



                                                        eGFR is Center



                                                        calculated 



                                                        using the 



                                                        CKD-EPI 



                                                        formula. In 



                                                        most young, 



                                                        healthy 



                                                        individuals 



                                                        the eGFR will 



                                                        be >90  



                                                        mL/min/1.73m2 



                                                        . The eGFR 



                                                        declines with 



                                                        age. An eGFR 



                                                        of 60-89 may 



                                                        be normal in 



                                                        some    



                                                        populations, 



                                                        particularly 



                                                        the elderly, 



                                                        for whom the 



                                                        CKD-EPI 



                                                        formula has 



                                                        not been 



                                                        extensively 



                                                        validated. 



                                                        Use of the 



                                                        eGFR is not 



                                                        recommended 



                                                        in the  



                                                        following 



                                                        populations:< 



                                                        br/><br/>Mary 



                                                        viduals with 



                                                        unstable 



                                                        creatinine 



                                                        concentration 



                                                        s, including 



                                                        pregnant 



                                                        patients and 



                                                        those with 



                                                        serious 



                                                        co-morbid 



                                                        conditions.<b 



                                                        r/><br/>Patie 



                                                        nts with 



                                                        extremes in 



                                                        muscle mass 



                                                        or diet. 



                                                        <br/><br/>The 



                                                        data above 



                                                        are obtained 



                                                        from the 



                                                        National 



                                                        [place]  



                                                        Disease 



                                                        Education 



                                                        Program 



                                                        (NKDEP) which 



                                                        additionally 



                                                        recommends 



                                                        that when the 



                                                        eGFR is used 



                                                        in patients 



                                                        with extremes 



                                                        of body mass 



                                                        index for 



                                                        purposes of 



                                                        drug dosing, 



                                                        the eGFR 



                                                        should be 



                                                        multiplied by 



                                                        the estimated 



                                                        BMI.    



                                                                

 

        CHEM PANEL Calcium Lvl 9.4     8.5 - 10.5                     [facility]

[facility]



                                        /2018                   [facility]



                                                                



                                                                



                                                                

 

        CHEM PANEL CO2     28      24 - 32                     [place]



                                        /2018                   [facility]



                                                                



                                                                



                                                                

 

        CHEM PANEL BUN     14      7 - 22                      [place]



                                        /2018                   [facility]



                                                                



                                                                



                                                                

 

        CHEM PANEL Glucose Lvl 89      70 - 99                     [place]



                                        /2018                   [facility]



                                                                



                                                                



                                                                

 

        CHEM PANEL Chloride Lvl 104     95 - 109                     a

s



                                                           [facility]



                                                                



                                                                



                                                                

 

        CHEM PANEL Potassium Lvl 4.2     3.5 - 5.1                     [place]

[place]



                                                           [facility]



                                                                



                                                                



                                                                

 

        CHEM PANEL Sodium Lvl 137     135 - 145                     [place]



                                        /2018                   [facility]



                                                                



                                                                



                                                                

 

        CHEM PANEL Creatinine 0.85    0.50 -                     [facility] [place]



                Lvl             1.40                       [facility]



                                                                



                                                                



                                                                

 

        CHEM PANEL AGAP    9.2     10.0 -                      [place]



                                20.0                       [facility]



                                                                



                                                                



                                                                

 

        HEMATOLOGY ACT (TEG) 136     86 - 118                     [place]



                Rapid                   /2018                   [facility]



                                                                



                                                                



                                                                

 

        HEMATOLOGY Split Point 0.6                                 [place]



                Rapid                   /2018                   [facility]



                                                                



                                                                



                                                                

 

        HEMATOLOGY R-time Rapid 0.9     0.4 - 0.7                     [place]

[place]



                                        /2018                   [facility]



                                                                



                                                                



                                                                

 

        HEMATOLOGY K-time Rapid 1.4     0.6 - 2.3                     [facility]

[facility]



                                        /2018                   [facility]



                                                                



                                                                



                                                                

 

        HEMATOLOGY Angle Rapid 71      64 - 80                     [place]



                                        /2018                   [facility]



                                                                



                                                                



                                                                

 

        HEMATOLOGY G-value Rapid 12.7    5.0 - 11.6                     [place]

[place]



                                                           [facility]



                                                                



                                                                



                                                                

 

        HEMATOLOGY Max Amplitude 72      52 - 71                     [place]

[place]



                Rapid                                      [facility]



                                                                



                                                                



                                                                

 

        HEMATOLOGY Estimated % 0.1     0.0 - 7.5                     [place]

[place]



                Lysis Rapid                 /2018                   [facility]



                                                                



                                                                



                                                                

 

        HEMATOLOGY Platelet 367     133 - 450                     [place]



                                        /2018                   [facility]



                                                                



                                                                



                                                                

 

        HEMATOLOGY MPV     7.8     7.4 - 10.4                     [place]



                                        /2018                   [facility]



                                                                



                                                                



                                                                

 

        HEMATOLOGY MCH     27.4    27.0 -                      [place]



                                31.0                       [facility]



                                                                



                                                                



                                                                

 

        HEMATOLOGY MCV     80.7    80.0 -                      [place]



                                94.0                       [facility]



                                                                



                                                                



                                                                

 

        HEMATOLOGY MCHC    34.0    32.0 -                     [facility] [place]



                                36.0                       [facility]



                                                                



                                                                



                                                                

 

        HEMATOLOGY RDW     18.9    11.5 -  11                    [place]



                                14.5                       [facility]



                                                                



                                                                



                                                                

 

        HEMATOLOGY Hct     43.3    42.0 -  11                    [place]



                                54.0                       [facility]



                                                                



                                                                



                                                                

 

        HEMATOLOGY WBC     9.3     3.7 - 10.4                     [place]



                                        /2018                   [facility]



                                                                



                                                                



                                                                

 

        HEMATOLOGY Hgb     14.7    14.0 -                      [place]



                                18.0                       [facility]



                                                                



                                                                



                                                                

 

        HEMATOLOGY RBC     5.36    4.70 -  11                    [place]



                                6.10                       [facility]



                                                                



                                                                



                                                                

 

        HEMATOLOGY Eosinophils # 0.2     0.0 - 0.5 11                   [facility]

                   [facility]



                                                                



                                                                



                                                                

 

        HEMATOLOGY Basophils # 0.1     0.0 - 0.2                    [facility]

                   [facility]



                                                                



                                                                



                                                                

 

        HEMATOLOGY Lymphocytes # 1.8     1.0 - 5.5                    [facility]                   [facility]



                                                                



                                                                



                                                                

 

        HEMATOLOGY Monocytes # 0.9     0.0 - 0.8                    [facility]

                   [facility]



                                                                



                                                                



                                                                

 

        HEMATOLOGY Neutrophils # 6.3     1.5 - 8.1                    [facility]                   [facility]



                                                                



                                                                



                                                                

 

        HEMATOLOGY Eosinophils 2.0     0.0 - 4.0                     

                   [facility]



                                                                



                                                                



                                                                

 

        HEMATOLOGY Segs    67.4    45.0 -                      [place]



                                75.0                       [facility]



                                                                



                                                                



                                                                

 

        HEMATOLOGY Lymphocytes 19.9    20.0 -                      [place]



                                40.0                       [facility]



                                                                



                                                                



                                                                

 

        HEMATOLOGY Basophils 1.0     0.0 - 1.0                     [place]



                                        /2018                   [facility]



                                                                



                                                                



                                                                

 

        HEMATOLOGY Monocytes 9.7     2.0 - 12.0                     [place]



                                                           [facility]



                                                                



                                                                



                                                                

 

        CHEM PANEL B/C Ratio 17      6 - 25                      [place]



                                        /2018                   [facility]



                                                                



                                                                



                                                                

 

        CHEM PANEL Globulin 4.3     2.7 - 4.2                     [place]



                                        /2018                   [facility]



                                                                



                                                                



                                                                

 

        CHEM PANEL A/G Ratio 0.7     0.7 - 1.6                     [place]



                                        /2018                   [facility]



                                                                



                                                                



                                                                

 

        CHEM PANEL AGAP    14.4    10.0 -                      [place]



                                20.0                       [facility]



                                                                



                                                                



                                                                

 

        CHEM PANEL eGFR    113                        Result   [place]



                                        /2018           Comment: [facility]



                                                        eGFR is [facility]



                                                        calculated 



                                                        using the 



                                                        CKD-EPI 



                                                        formula. In 



                                                        most young, 



                                                        healthy 



                                                        individuals 



                                                        the eGFR will 



                                                        be >90  



                                                        mL/min/1.73m2 



                                                        . The eGFR 



                                                        declines with 



                                                        age. An eGFR 



                                                        of 60-89 may 



                                                        be normal in 



                                                        some    



                                                        populations, 



                                                        particularly 



                                                        the elderly, 



                                                        for whom the 



                                                        CKD-EPI 



                                                        formula has 



                                                        not been 



                                                        extensively 



                                                        validated. 



                                                        Use of the 



                                                        eGFR is not 



                                                        recommended 



                                                        in the  



                                                        following 



                                                        populations:< 



                                                        br/><br/>[place] 



                                                        viduals with 



                                                        unstable 



                                                        creatinine 



                                                        concentration 



                                                        s, including 



                                                        pregnant 



                                                        patients and 



                                                        those with 



                                                        serious 



                                                        co-morbid 



                                                        conditions.<b 



                                                        r/><br/>Patie 



                                                        nts with 



                                                        extremes in 



                                                        muscle mass 



                                                        or diet. 



                                                        <br/><br/>The 



                                                        data above 



                                                        are obtained 



                                                        from the 



                                                        [employer] 



                                                        [place]  



                                                        Disease 



                                                        Education 



                                                        Program 



                                                        (NKDEP) which 



                                                        additionally 



                                                        recommends 



                                                        that when the 



                                                        eGFR is used 



                                                        in patients 



                                                        with extremes 



                                                        of body mass 



                                                        index for 



                                                        purposes of 



                                                        drug dosing, 



                                                        the eGFR 



                                                        should be 



                                                        multiplied by 



                                                        the estimated 



                                                        BMI.    



                                                                

 

        CHEM PANEL Alk Phos 76      39 - 136 05/                   [address]



                                                                



                                                                



                                                                

 

        CHEM PANEL ALT     35      0 - 65  05                   [address]



                                                                



                                                                



                                                                

 

        CHEM PANEL Albumin Lvl 2.8     3.5 - 5.0                    [address]



                                                                



                                                                



                                                                

 

        CHEM PANEL Total Protein 7.1     6.4 - 8.4                    [address]



                                                                



                                                                



                                                                

 

        CHEM PANEL Calcium Lvl 8.7     8.5 - 10.5 /                   [address]



                                                                



                                                                



                                                                

 

        CHEM PANEL AST     18      0 - 37  05/                   [address]



                                                                



                                                                



                                                                

 

        CHEM PANEL Bili Total 0.3     0.2 - 1.3 /                   [address]



                                                                



                                                                



                                                                

 

        CHEM PANEL Potassium Lvl 4.4     3.5 - 5.1 /                   [address]



                                                                



                                                                



                                                                

 

        CHEM PANEL Chloride Lvl 109     95 - 109 05/                   [address]



                                                                



                                                                



                                                                

 

        CHEM PANEL CO2     23      24 - 32 05/                   [address]



                                                                



                                                                



                                                                

 

        CHEM PANEL Glucose Lvl 114     70 - 99 05/                   [address]



                                                                



                                                                



                                                                

 

        CHEM PANEL Creatinine 1.01    0.50 -  05/                   [facility] [place]



                Lvl             1.40    /                   [facility]



                                                                



                                                                



                                                                

 

        CHEM PANEL BUN     17      7 - 22  05/                   [address]



                                                                



                                                                



                                                                

 

        CHEM PANEL Sodium Lvl 142     135 - 145 05/                   [address]



                                                                



                                                                



                                                                

 

        HEMATOLOGY Basophils 0.6     0.0 - 1.0 /                   [address]



                                                                



                                                                



                                                                

 

        HEMATOLOGY Segs-Bands # 4.8     1.5 - 8.1 /                   [address]



                                                                



                                                                



                                                                

 

        HEMATOLOGY Monocytes # 0.7     0.0 - 0.8 /                   [address]



                                                                



                                                                



                                                                

 

        HEMATOLOGY Lymphocytes # 1.9     1.0 - 5.5 05                                       [facility]



                                                                



                                                                



                                                                

 

        HEMATOLOGY Monocytes 9.4     2.0 - 12.0                     [place]



                                        /2018                   [facility]



                                                                



                                                                



                                                                

 

        HEMATOLOGY Eosinophils # 0.2     0.0 - 0.5                                        [facility]



                                                                



                                                                



                                                                

 

        HEMATOLOGY Eosinophils 2.9     0.0 - 4.0                     [place]

[place]



                                        /                   [facility]



                                                                



                                                                



                                                                

 

        HEMATOLOGY Segs    62.2    45.0 -                      [place]



                                75.0                       [facility]



                                                                



                                                                



                                                                

 

        HEMATOLOGY Lymphocytes 24.9    20.0 -                      [place]



                                40.0    /                   [facility]



                                                                



                                                                



                                                                

 

        HEMATOLOGY MCH     27.5    27.0 -                      [place]



                                31.0                       [facility]



                                                                



                                                                



                                                                

 

        HEMATOLOGY MCV     85.3    80.0 -                      [place]



                                94.0                       [facility]



                                                                



                                                                



                                                                

 

        HEMATOLOGY Hct     43.9    42.0 -                      [place]



                                54.0                       [facility]



                                                                



                                                                



                                                                

 

        HEMATOLOGY Hgb     14.2    14.0 -                      [place]



                                18.0                       [facility]



                                                                



                                                                



                                                                

 

        HEMATOLOGY WBC     7.7     3.7 - 10.4                     [place]



                                        /2018                   [facility]



                                                                



                                                                



                                                                

 

        HEMATOLOGY RBC     5.15    4.70 -                      [place]



                                6.10                       [facility]



                                                                



                                                                



                                                                

 

        HEMATOLOGY MPV     8.4     7.4 - 10.4                     [place]



                                        /2018                   [facility]



                                                                



                                                                



                                                                

 

        HEMATOLOGY MCHC    32.3    32.0 -                      [place]



                                36.0                       [facility]



                                                                



                                                                



                                                                

 

        HEMATOLOGY RDW     17.3    11.5 -                      [place]



                                14.5    /                   [facility]



                                                                



                                                                



                                                                

 

        HEMATOLOGY Platelet 317     133 - 450                     [place]



                                                           [facility]



                                                                



                                                                



                                                                

 

        CHEM PANEL Globulin 4.4     2.7 - 4.2                     [place]



                                        /2018                   [facility]



                                                                



                                                                



                                                                

 

        CHEM PANEL A/G Ratio 0.6     0.7 - 1.6                     [place]



                                        /2018                   [facility]



                                                                



                                                                



                                                                

 

        CHEM PANEL B/C Ratio 17      6 - 25                      [place]



                                        /2018                   [facility]



                                                                



                                                                



                                                                

 

        CHEM PANEL AGAP    11.3    10.0 -                      [place]



                                20.0                       [facility]



                                                                



                                                                



                                                                

 

        CHEM PANEL eGFR    134             05/           [facility] [place]



                                        /2018           Comment: [facility]



                                                        eGFR is Center



                                                        calculated 



                                                        using the 



                                                        CKD-EPI 



                                                        formula. In 



                                                        most young, 



                                                        healthy 



                                                        individuals 



                                                        the eGFR will 



                                                        be >90  



                                                        mL/min/1.73m2 



                                                        . The eGFR 



                                                        declines with 



                                                        age. An eGFR 



                                                        of 60-89 may 



                                                        be normal in 



                                                        some    



                                                        populations, 



                                                        particularly 



                                                        the elderly, 



                                                        for whom the 



                                                        CKD-EPI 



                                                        formula has 



                                                        not been 



                                                        extensively 



                                                        validated. 



                                                        Use of the 



                                                        eGFR is not 



                                                        recommended 



                                                        in the  



                                                        following 



                                                        populations:< 



                                                        br/><br/>[facility] 



                                                        viduals with 



                                                        unstable 



                                                        creatinine 



                                                        concentration 



                                                        s, including 



                                                        pregnant 



                                                        patients and 



                                                        those with 



                                                        serious 



                                                        co-morbid 



                                                        conditions.<b 



                                                        r/><br/>Patie 



                                                        nts with 



                                                        extremes in 



                                                        muscle mass 



                                                        or diet. 



                                                        <br/><br/>The 



                                                        data above 



                                                        are obtained 



                                                        from the 



                                                        [employer] 



                                                        [place]  



                                                        Disease 



                                                        Education 



                                                        Program 



                                                        (NKDEP) which 



                                                        additionally 



                                                        recommends 



                                                        that when the 



                                                        eGFR is used 



                                                        in patients 



                                                        with extremes 



                                                        of body mass 



                                                        index for 



                                                        purposes of 



                                                        drug dosing, 



                                                        the eGFR 



                                                        should be 



                                                        multiplied by 



                                                        the estimated 



                                                        BMI.    



                                                                

 

        CHEM PANEL Creatinine 0.84    0.50 -                     [facility] [place]



                Lvl             1.40                       [facility]



                                                                



                                                                



                                                                

 

        CHEM PANEL Sodium Lvl 142     135 - 145                    [address]



                                                                



                                                                



                                                                

 

        CHEM PANEL Glucose Lvl 99      70 - 99                    [address]



                                                                



                                                                



                                                                

 

        CHEM PANEL BUN     14      7 - 22                     [address]



                                                                



                                                                



                                                                

 

        CHEM PANEL Alk Phos 79      39 - 136                    [address]



                                                                



                                                                



                                                                

 

        CHEM PANEL Bili Total 0.3     0.2 - 1.3                    [address]



                                                                



                                                                



                                                                

 

        CHEM PANEL AST     14      0 - 37                     [address]



                                                                



                                                                



                                                                

 

        CHEM PANEL ALT     43      0 - 65                     [address]



                                                                



                                                                



                                                                

 

        CHEM PANEL Total Protein 7.1     6.4 - 8.4                    [address]



                                                                



                                                                



                                                                

 

        CHEM PANEL Albumin Lvl 2.7     3.5 - 5.0                    [address]



                                                                



                                                                



                                                                

 

        CHEM PANEL Calcium Lvl 9.2     8.5 - 10.5                    [facility]

as



                                        /2018                   [facility]



                                                                



                                                                



                                                                

 

        CHEM PANEL CO2     21      24 - 32                    [address]



                                                                



                                                                



                                                                

 

        CHEM PANEL Potassium Lvl 4.3     3.5 - 5.1                    [address]



                                                                



                                                                



                                                                

 

        CHEM PANEL Chloride Lvl 114     95 - 109                    [facility]                   [facility]



                                                                



                                                                



                                                                

 

        HEMATOLOGY MCHC    32.5    32.0 -                     [facility] [place]



                                36.0                       [facility]



                                                                



                                                                



                                                                

 

        HEMATOLOGY RDW     17.5    11.5 -  05/                   [facility] [place]



                                14.5                       [facility]



                                                                



                                                                



                                                                

 

        HEMATOLOGY Platelet 400     133 - 450                    [address]



                                                                



                                                                



                                                                

 

        HEMATOLOGY MPV     8.5     7.4 - 10.4                    [address]



                                                                



                                                                



                                                                

 

        HEMATOLOGY WBC     6.6     3.7 - 10.4                     [place]



                                        /2018                   [facility]



                                                                



                                                                



                                                                

 

        HEMATOLOGY RBC     5.17    4.70 -                      [place]



                                6.10                       [facility]



                                                                



                                                                



                                                                

 

        HEMATOLOGY MCV     86.1    80.0 -                      [place]



                                94.0                       [facility]



                                                                



                                                                



                                                                

 

        HEMATOLOGY Hct     44.5    42.0 -                      [place]



                                54.0                       [facility]



                                                                



                                                                



                                                                

 

        HEMATOLOGY MCH     28.0    27.0 -                      [place]



                                31.0                       [facility]



                                                                



                                                                



                                                                

 

        HEMATOLOGY Hgb     14.5    14.0 -                      [place]



                                18.0                       [facility]



                                                                



                                                                



                                                                

 

        HEMATOLOGY Lymphocytes # 1.7     1.0 - 5.5                    [facility]



                                                           [facility]



                                                                



                                                                



                                                                

 

        HEMATOLOGY Monocytes # 0.7     0.0 - 0.8                    [facility]

                   [facility]



                                                                



                                                                



                                                                

 

        HEMATOLOGY Eosinophils # 0.2     0.0 - 0.5                    [facility]



                                                           [facility]



                                                                



                                                                



                                                                

 

        HEMATOLOGY Lymphocytes 26.1    20.0 -                      [place]



                                40.0                       [facility]



                                                                



                                                                



                                                                

 

        HEMATOLOGY Segs    59.3    45.0 -                      [place]



                                75.0                       [facility]



                                                                



                                                                



                                                                

 

        HEMATOLOGY Basophils 0.8     0.0 - 1.0                     [place]



                                        /2018                   [facility]



                                                                



                                                                



                                                                

 

        HEMATOLOGY Monocytes 10.5    2.0 - 12.0                     [place]



                                        /2018                   [facility]



                                                                



                                                                



                                                                

 

        HEMATOLOGY Eosinophils 3.3     0.0 - 4.0                    [facility] [place] [facility]



                                                           [facility]



                                                                



                                                                



                                                                

 

        HEMATOLOGY Segs-Bands # 3.9     1.5 - 8.1                    [facility]

as



                                        /2018                   [facility]



                                                                



                                                                



                                                                

 

        TOXICOLOGY Vanco Tr TND 15:30pm                            [facility] [place]



                                                           [facility]



                                                                



                                                                



                                                                

 

        TOXICOLOGY Vanco Tr 9.1                                 [place]



                                        /2018                   [facility]



                                                                



                                                                



                                                                

 

        CHEM PANEL Glucose Lvl 74      70 - 99                     [place]



                                        /2018                   [facility]



                                                                



                                                                



                                                                

 

        CHEM PANEL BUN     12      7 - 22                     [facility] [place]



                                        /                   [facility]



                                                                



                                                                



                                                                

 

        CHEM PANEL Creatinine 0.75    0.50 -                     [facility] [place]



                Lvl             1.40                       [facility]



                                                                



                                                                



                                                                

 

        CHEM PANEL eGFR    140             05           [facility] [place]



                                        /2018           Comment: The [facility]



                                                        eGFR is Center



                                                        calculated 



                                                        using the 



                                                        CKD-EPI 



                                                        formula. In 



                                                        most young, 



                                                        healthy 



                                                        individuals 



                                                        the eGFR will 



                                                        be >90  



                                                        mL/min/1.73m2 



                                                        . The eGFR 



                                                        declines with 



                                                        age. An eGFR 



                                                        of 60-89 may 



                                                        be normal in 



                                                        some    



                                                        populations, 



                                                        particularly 



                                                        the elderly, 



                                                        for whom the 



                                                        CKD-EPI 



                                                        formula has 



                                                        not been 



                                                        extensively 



                                                        validated. 



                                                        Use of the 



                                                        eGFR is not 



                                                        recommended 



                                                        in the  



                                                        following 



                                                        populations:< 



                                                        br/><br/>[place] 



                                                        viduals with 



                                                        unstable 



                                                        creatinine 



                                                        concentration 



                                                        s, including 



                                                        pregnant 



                                                        patients and 



                                                        those with 



                                                        serious 



                                                        co-morbid 



                                                        conditions.<b 



                                                        r/><br/>Patie 



                                                        nts with 



                                                        extremes in 



                                                        muscle mass 



                                                        or diet. 



                                                        <br/><br/>The 



                                                        data above 



                                                        are obtained 



                                                        from the 



                                                        [employer] 



                                                        [place]  



                                                        Disease 



                                                        Education 



                                                        Program 



                                                        (NKDEP) which 



                                                        additionally 



                                                        recommends 



                                                        that when the 



                                                        eGFR is used 



                                                        in patients 



                                                        with extremes 



                                                        of body mass 



                                                        index for 



                                                        purposes of 



                                                        drug dosing, 



                                                        the eGFR 



                                                        should be 



                                                        multiplied by 



                                                        the estimated 



                                                        BMI.    



                                                                

 

        CHEM PANEL Albumin Lvl 2.9     3.5 - 5.0 05/                   [address]



                                                                



                                                                



                                                                

 

        CHEM PANEL Globulin 4.4     2.7 - 4.2                    [address]



                                                                



                                                                



                                                                

 

        CHEM PANEL A/G Ratio 0.7     0.7 - 1.6                    [address]



                                                                



                                                                



                                                                

 

        CHEM PANEL Bili Total 0.4     0.2 - 1.3                    [address]



                                                                



                                                                



                                                                

 

        CHEM PANEL Alk Phos 71      39 - 136 05/                   [address]



                                                                



                                                                



                                                                

 

        CHEM PANEL AST     15      0 - 37  05/                   [address]



                                                                



                                                                



                                                                

 

        CHEM PANEL ALT     28      0 - 65  05/                   [address]



                                                                



                                                                



                                                                

 

        CHEM PANEL Potassium Lvl 4.4     3.5 - 5.1                    [address]



                                                                



                                                                



                                                                

 

        CHEM PANEL Sodium Lvl 147     135 - 145 05/                   [address]



                                                                



                                                                



                                                                

 

        CHEM PANEL CO2     25      24 - 32 05                   [address]



                                                                



                                                                



                                                                

 

        CHEM PANEL Chloride Lvl 114     95 - 109 05                   [address]



                                                                



                                                                



                                                                

 

        CHEM PANEL B/C Ratio 16      6 - 25  /                   [address]



                                                                



                                                                



                                                                

 

        CHEM PANEL Calcium Lvl 8.7     8.5 - 10.5 /                   [address]



                                                                



                                                                



                                                                

 

        CHEM PANEL AGAP    12.4    10.0 -  05/                   [facility] [place]



                                20.0                       [facility]



                                                                



                                                                



                                                                

 

        CHEM PANEL Total Protein 7.3     6.4 - 8.4                    [address]



                                                                



                                                                



                                                                

 

        HEMATOLOGY Platelet 345     133 - 450 05/                   [address]



                                                                



                                                                



                                                                

 

        HEMATOLOGY MPV     8.7     7.4 - 10.4                    [address]



                                                                



                                                                



                                                                

 

        HEMATOLOGY WBC     6.9     3.7 - 10.4 05                    [place]



                                        /2018                   [facility]



                                                                



                                                                



                                                                

 

        HEMATOLOGY RBC     5.30    4.70 -  05                    [place]



                                6.10                       Medical



                                                                [place]



                                                                



                                                                



                                                                

 

        HEMATOLOGY MCHC    32.8    32.0 -  05                    [place]



                                36.0                       [facility]



                                                                



                                                                



                                                                

 

        HEMATOLOGY MCH     27.9    27.0 -                      [place]



                                31.0                       [facility]



                                                                



                                                                



                                                                

 

        HEMATOLOGY Hgb     14.8    14.0 -                      [place]



                                18.0                       [facility]



                                                                



                                                                



                                                                

 

        HEMATOLOGY MCV     85.0    80.0 -                      [place]



                                94.0                       [facility]



                                                                



                                                                



                                                                

 

        HEMATOLOGY Hct     45.1    42.0 -                      [place]



                                54.0                       [facility]



                                                                



                                                                



                                                                

 

        HEMATOLOGY RDW     17.5    11.5 -  05                    [place]



                                14.5                       [facility]



                                                                



                                                                



                                                                

 

        HEMATOLOGY Eosinophils # 0.2     0.0 - 0.5 05                   [facility]

[place]



                                                           [facility]



                                                                



                                                                



                                                                

 

        HEMATOLOGY Lymphocytes # 2.0     1.0 - 5.5                    [facility]

[place]



                                                           [facility]



                                                                



                                                                



                                                                

 

        HEMATOLOGY Monocytes # 0.7     0.0 - 0.8                    [facility]

s



                                                           [facility]



                                                                



                                                                



                                                                

 

        HEMATOLOGY Eosinophils 2.4     0.0 - 4.0                    [facility]

s



                                                           [facility]



                                                                



                                                                



                                                                

 

        HEMATOLOGY Basophils 0.6     0.0 - 1.0 05                    [place]



                                        /2018                   [facility]



                                                                



                                                                



                                                                

 

        HEMATOLOGY Segs-Bands # 4.0     1.5 - 8.1                     [place]

[place]



                                        /2018                   [facility]



                                                                



                                                                



                                                                

 

        HEMATOLOGY Monocytes 10.4    2.0 - 12.0                     [place]



                                        /2018                   [facility]



                                                                



                                                                



                                                                

 

        HEMATOLOGY RBC Morph Normal                             [facility] [place]



                        (18 2:07 AM)                            [facility]



                                                                



                                                                



                                                                

 

        HEMATOLOGY Segs    58.1    45.0 -                      [place]



                                75.0                       [facility]



                                                                



                                                                



                                                                

 

        HEMATOLOGY Plt Morph Normal                             [facility] [place]



                        (18 2:07 AM)                            [facility]



                                                                



                                                                



                                                                

 

        HEMATOLOGY Lymphocytes 28.5    20.0 -  05                    [place]



                                40.0                       [facility]



                                                                



                                                                



                                                                

 

        HEMATOLOGY Basophils # 0.1     0.0 - 0.2                    [facility] [place] [facility]



                                                           [facility]



                                                                



                                                                



                                                                

 

        HEMATOLOGY Polychrom Moderate None Seen                    [facility] [place]



                        *ABN*           /                   Medical



                        (18 11:07 AM)                                 [place]



                                                                



                                                                



                                                                

 

        TOXICOLOGY Vanco Tr TND *               05                   [facility] [place]



                                                           [facility]



                                                                



                                                                



                                                                

 

        TOXICOLOGY Vanco Tr 22.3            05                   [facility] [place]



                                                           [facility]



                                                                



                                                                



                                                                

 

        CHEM PANEL Magnesium Lvl 2.3     1.8 - 2.4                     [place]

[place]



                                        /                   [facility]



                                                                



                                                                



                                                                

 

        CHEM PANEL Phosphorus 3.5     2.5 - 4.5                     [place]



                                        /2018                   [facility]



                                                                



                                                                



                                                                

 

        HEMATOLOGY PT      14.0    12.0 -                      [place]



                                14.7                       [facility]



                                                                



                                                                



                                                                

 

        HEMATOLOGY PTT     37.6    22.9 -                      [place]



                                35.8                       [facility]



                                                                



                                                                



                                                                

 

        HEMATOLOGY INR     1.08    0.85 -                      [place]



                                1.17                       [facility]



                                                                



                                                                



                                                                

 

        IMMUNOLOGY C-REACTIVE 13.1    <=2.9 mg/L                     [place]

[place]



                PROTEIN                 /                   [facility]



                                                                



                                                                



                                                                

 

        IMMUNOLOGY Prealbumin 25.2    18.0 -                      [place]



                                45.0                       [facility]



                                                                



                                                                



                                                                

 

        CHEM PANEL Lactic Acid 0.9     0.5 - 2.2                     [place]

s



                Lvl                     /                   [facility]



                                                                



                                                                



                                                                

 

        HEMATOLOGY Sed Rate 5       0 - 15                      [place]



                                        /2018                   [facility]



                                                                



                                                                



                                                                

 

        IMMUNOLOGY C-REACTIVE 15.8    <=2.9 mg/L                    [facility] [place] [facility]



                PROTEIN                                    [facility]



                                                                



                                                                



                                                                

 

        HEMATOLOGY PT      13.0    12.0 -                      [place]



                                14.7                       [facility]



                                                                



                                                                



                                                                

 

        HEMATOLOGY INR     0.98    0.85 -                      [place]



                                1.17                       [facility]



                                                                



                                                                



                                                                

 

        HEMATOLOGY PTT     33.2    22.9 -                      [place]



                                35.8                       [facility]



                                                                



                                                                



                                                                

 

        BLOOD BANK Antibody Scrn Negative                            [facility]

as



        RESULTS         (5/3/18 6:51 PM)         /                   [facility]



                                                                



                                                                



                                                                

 

        BLOOD BANK ABO/Rh  AB POS                             [facility] [place]



        RESULTS                         /                   [facility]



                                                                



                                                                



                                                                

 

        URINE AND UA      0.2     0.1 - 1.0                    [facility]



        STOOL   Urobilinogen                 /                   [facility]



                                                                



                                                                



                                                                

 

        URINE AND UA Nitrite Negative Negative                    [facility]



        STOOL           (5/3/18 6:35 PM)         /                   [facility]



                                                                



                                                                



                                                                

 

        URINE AND UA Glucose Negative Negative                    [facility]



        STOOL           (5/3/18 6:35 PM)         /                   [facility]



                                                                



                                                                



                                                                

 

        URINE AND UA Ketones Negative Negative                    [facility]



        STOOL           *NA*            /                   Medical



                        (5/3/18 6:35 PM)                                 [place]



                                                                



                                                                



                                                                

 

        URINE AND UA Bili Negative Negative                    [facility]



        STOOL           *NA*            /                   [facility]



                        (5/3/18 6:35 PM)                                 [place]



                                                                



                                                                



                                                                

 

        URINE AND UA Blood Trace   Negative                    [facility]



        STOOL           *ABN*           /                   [facility]



                        (5/3/18 6:35 PM)                                 [place]



                                                                



                                                                



                                                                

 

        URINE AND UA Leuk Est Small   Negative                    [facility]           *ABN*                              Medical



                        (5/3/18 6:35 PM)                                 [place]



                                                                



                                                                



                                                                

 

        URINE AND UA Spec Grav 1.020   <=1.030                    [facility]                           /[address] [place] [address]



                                                                



                                                                



                                                                

 

        URINE AND UA pH   6.0     5.0 - 8.0                    [address]



                                                                



                                                                



                                                                

 

        URINE AND UA Color Yellow  Yellow                     [facility]           *NA*            /                   Medical



                        (5/3/18 6:35 PM)                                 [place]



                                                                



                                                                



                                                                

 

        URINE AND UA Protein Trace   Negative                    [facility]           *ABN*                              Medical



                        (5/3/18 6:35 PM)                                 [place]



                                                                



                                                                



                                                                

 

        URINE AND UA Turbidity Slight Cloudy Clear                      [facility]           (5/3/18 6:35 PM)                            [facility]



                                                                



                                                                



                                                                

 

        URINE AND UA Hyal Cast 0-2     0 - 2                      [facility]           (5/3/18 6:35 PM)         [address] [place] [address]



                                                                



                                                                



                                                                

 

        URINE AND UA Bacteria Occasional None Seen                     [place]



        STOOL           /HPF    /HPF                       [facility]



                                                                



                                                                



                                                                

 

        URINE AND UA RBC  11-20 /HPF 0 - 2                      [facility]                           /[address] [place] [address]



                                                                



                                                                



                                                                

 

        URINE AND UA Sq Epi Occasional Few /LPF                    [facility]



        STOOL           /LPF            /                   [facility]



                                                                



                                                                



                                                                

 

        URINE AND UA WBC    None Seen                    [facility]



        STOOL           /HPF    /HPF    /[address] [place] [address]



                                                                



                                                                



                                                                

 

        HEMATOLOGY Basophils # 0.1     0.0 - 0.2                     [place]

[place]



                                                           [facility]



                                                                



                                                                



                                                                

 

        HEMATOLOGY Polychrom Slight                             [address]



                                                                



                                                                



                                                                

 

        HEMATOLOGY Plt Morph Normal                             [facility]



                        (5/3/18 6:20 PM)         [address]



                                                                



                                                                



                                                                







Pathology Reports







                                        No Data Provided for This Section







Diagnostic Reports







                Report          Value           Date            Source



                                                                

 

                    Brain-Outside Consult EXAM: CT BRAIN WITHOUT CONTRAST -- OUT

SIDE CONSULT 

2018                              [facility]



                CT              DATE: 2018 4:49 AM CST                 Cent

er



                                INDICATION: ' - PAIN'                 



                                COMPARISON: Noncontrast head CTs 2018, , 2013                 



                                                    TECHNIQUE: Noncontrast [facility] CT submitted for 2nd interpretation. 

205 images. Imaging was performed at [facility] on 
2018.                                         



                                IV contrast: None.                 



                                FINDINGS:                       



                                                    Overall unchanged exam. Righ

t transfrontal ventriculostomy catheter is 

unchanged in position. 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 
size and configuration. The abandoned right transparie                          

 



                                hudson catheter is unchanged. Stigmata of Chiari II

 malformation.                 



                                There is no intracranial hemorrhage or extra-axi

al collection.                 



                                                    No new parenchymal density a

bnormality. No mass or mass effect. Unchanged from 

the tonsillar herniation.                           



                                The density of the larger intracranial sinuses i

s normal.                 



                                                                



                                IMPRESSION: Unchanged examination compared to                  



                                        The final report agrees with the prelimi

nary report by the radiology resident. 

                                        



                                                                

 

                Brain shunt series DX EXAM: SKULL 2 VIEWS 2018       [facility]



                                EXAM: CHEST 2 VIEWS                 Center



                                EXAM: ABDOMEN 2 VIEWS                 



                                DATE: 5/3/2018 7:20 PM CDT                 



                                INDICATION: Headache. - Please include Codman vi

ew for shunt setting.                 



                                COMPARISON: Brain shuntogram 2013          

       



                                TECHNIQUE: AP and lateral views of the skull, ch

est and abdomen.                 



                                FINDINGS:                       



                                                    There is a single intact red

nt tube with the proximal aspect in the right 

frontal calvarium coursing across midline to the left anterior chest and abdomen
with the tip coiled within the pelvis.                           



                                                                



                                                    A right parietal shunt with 

distal tip in the right lateral abdomen is 

discontinuous. Another shunt present with proximal tip in the right neck base 
and distal tip in the medial mid abdomen                           



                                                    Cholecystectomy clips are no

huma. The lungs are clear but underinflated. 

Appearance of the pelvis is unchanged with bilateral shallow acetabular roofs. 
No obvious posterior dislocation. Spinal dysraphism                           



                                 is seen with absence of the spinous process fro

m L3 to S1.                 



                                                                



                                IMPRESSION:                     



                                                                



                                                    1. No significant change. Th

e right transfrontal shunt catheter is intact along

its course with the distal tip in the pelvis.                           



                                                    2. Discontinuous right parie

to-occipital catheter and 2 discontinuous catheters

in the right chest and abdomen are unchanged.                           



                                3. Spinal dysraphism.                 



                                                                

 

                Brain wo contrast CT EXAM: CT BRAIN WITHOUT CONTRAST 2018 

     [facility]



                                DATE: 5/3/2018 627 PM CDT                 Center



                                INDICATION: 32-year-old male patient with histor

y of  shunt malfunction                 



                                COMPARISON: CT of the brain 2015, 2013

.                 



                                                    TECHNIQUE: Axial CT images o

f the brain were obtained. Sagittal and coronal 

reformats.                                          



                                IV contrast: None.                 



                                FINDINGS:                       



                                                    An orphaned right occipital 

approach  shunt is present. Also present is a 

right frontal approach  shunt with tip in the left lateral ventricle.         

                  



                                Narrowing of the lateral ventricles is present, 

unchanged from 2015.                 



                                There is mild uncal and cerebellar tonsillar her

niation, also unchanged.                 



                                No recent hemorrhage or territorial infarct. No 

mass. No midline shift.                 



                                No scalp fluid collections.                 



                                Sinuses are clear.                 



                                IMPRESSION:                     



                                No acute intracranial abnormality.              

   



                                Adequately decompressed ventricular system.     

            



                                                                

 

                Chest 1view DX  EXAM: XR CHEST 1 VIEW 2018      [facility]

edical



                                DATE: 5/3/2018 6:12 PM CDT                 [facility]

r



                                                                



                                INDICATION:  - picc line use                 



                                UT SECTION: ER                  



                                COMPARISON: None.                 



                                TECHNIQUE: AP chest                 



                                FINDINGS:                       



                                Lines, tubes and hardware:                 



                                Left PICC tip at mid SVC.                 



                                                    Lungs and pleura: No pulmona

ry or pleural based abnormality is identified. 

Pulmonary vascularity is normal.                           



                                                    Heart and mediastinum: The h

eart size is normal for technique. The mediastinal 

contours are normal.                                



                                Bones: No acute bony abnormality is identified. 

                



                                Upper abdomen: Normal.                 



                                                                



                                IMPRESSION:                     



                                Left PICC tip at mid SVC.                 



                                No acute cardiopulmonary abnormality is observed

.                 



                                                                







Consultation Notes







                                        No Data Provided for This Section







Discharge Summaries







                                        No Data Provided for This Section







History and Physicals







                                        No Data Provided for This Section







Vital Signs







             Vital Sign   Value        Date         Comments     Source



                                                                 

 

             Respitory Rate 16           2018                [facility]



                                                                 

 

             Systolic (mm Hg) 138          2018                [facility]



                                                                 

 

             Diastolic (mm Hg) 68           2018                [facility]



                                                                 

 

             Temperature Oral (F) 98.4 F       2018                [facility]



                                                                 

 

             Temperature Oral (F) 98.6 F       2018                [facility]



                                                                 

 

             Systolic (mm Hg) 134          2018                [facility]



                                                                 

 

             Diastolic (mm Hg) 67           2018                [facility]



                                                                 

 

             Respitory Rate 18           2018                [facility]



                                                                 

 

             Systolic (mm Hg) 131          2018                [facility]



                                                                 

 

             Diastolic (mm Hg) 62           2018                [facility]



                                                                 

 

             Respitory Rate 16           2018                [facility]



                                                                 

 

             BMI Calculated 58.7         2018                [facility]



                                                                 

 

             Height       149.86 cm    2018                [facility]



                                                                 

 

             Weight       131.818      2018                [facility]



                                                                 

 

             Heart Rate   74           2018                [facility]

l



                                                                 Center



                                                                 

 

             Temperature Oral (F) 97.9 F       2018                [facility]



                                                                 

 

             Temperature Oral (F) 97.2 F       2018                [facility]



                                                                 

 

             Systolic (mm Hg) 127          2018                [facility] [place] [facility]

dical



                                                                 Center



                                                                 

 

             Diastolic (mm Hg) 85           2018                [facility]



                                                                 

 

             Heart Rate   81           2018                [facility]

l



                                                                 Center



                                                                 

 

             Respitory Rate 18           2018                [facility]



                                                                 

 

             Heart Rate   90           2018                [facility]

l



                                                                 Center



                                                                 

 

             Systolic (mm Hg) 106          2018                [facility]

dical



                                                                 Center



                                                                 

 

             Diastolic (mm Hg) 71           2018                [facility]

edical



                                                                 Center



                                                                 

 

             Respitory Rate 18           2018                [facility]



                                                                 

 

             Temperature Oral (F) 98.1 F       2018                [facility]



                                                                 

 

             Respitory Rate 18           2018                [facility]



                                                                 

 

             Systolic (mm Hg) 168          2018                [facility] [place] [facility]

dical



                                                                 Center



                                                                 

 

             Diastolic (mm Hg) 107          2018                [facility]



                                                                 

 

             Heart Rate   87           2018                [facility]

[facility]



                                                                 

 

             Temperature Oral (F) 98.1 F       2018                [facility]



                                                                 

 

             Weight       127.027      2018                [facility]

l



                                                                 Center



                                                                 

 

             Weight       127.027      2018                [facility]





                                                                 Center



                                                                 

 

             Weight       127.027      2018                [facility]

[facility]



                                                                 

 

             BMI Calculated 48.16        2018                [facility]



                                                                 

 

             Height       162.56 cm    2018                [facility]

[facility]



                                                                 

 

             Height       149.86 cm    2018                [facility]



                                                                 

 

             BMI Calculated 56.67        2018                [facility]



                                                                 







Encounters







       Location Location Encounter Encounter Reason Attending ADM    DC     Stat

us Source



              Details Type   Number For    Provider Date   Date          



                                   Visit                              



                                                                      



                                                                      



                                                                      

 

       [facility] [id number]        [facility]        

  [facility]



                                                                      



                                                                      



                                                                      

 

       [facility]        Emergency [id number]        Peter         

 [facility]                             Saw /2018         [facility]



                                                                      



                                                                      



                                                                      

 

       [facility]           Phone  [phone]                        [facility]

her



       Neurosurgery        Message                      /2019         [facility]

ro



       C                                                            



                                                                      



                                                                      



                                                                      

 

       [facility]           Phone  [phone]               07/15  07/17         Misc

her



       Neurosurgery        Message                      /2019         [facility]anca villalba



       [facility]                                                            



                                                                      



                                                                      



                                                                      

 

       [facility]           Phone  [phone]                        [facility]

her



       Neurosurgery        Message                      /2019         [facility]anca villalba



       [facility]                                                            



                                                                      



                                                                      



                                                                      

 

       MNA           Phone  [phone]                        [facility]

her



       Neurosurgery        Message                      /2019         Blanca villabla



       [facility]                                                            



                                                                      



                                                                      



                                                                      







Procedures







           Procedure  Code       Date       Perfomer   Comments   Source



                                                                  



                                                                  

 

           Cholecystectomy [id number]                                    [facility]



                                                                  Neuro,[facility]



                                                                  

 

           Shunt of cerebral [id number]                                    [facility]



           ventricle to                                             Neuro,



           extracranial site                                             [facility]



                                                                  







Assessment and Plan







                    Assessment and Plan Date                Source



                                                            

 

                    Extracted from:Title: Ophthalmology Consultation 2018 

         [facility]



                    Author: Jazmyn Turner MD                     



                    Date: 18                           



                    CONSULTATION - OPHTHALMOLOGY                     



                    PATIENT NAME: Jordan CARDONA MR #: [id number]                       



                    ROOM:ED                                 



                    REQUESTING TEAM/ATTENDING: TRISTIAN                     



                    DATE OF CONSULT: 2018                     



                    CONSULTING ATTENDING: Alla Goldberg, MD                     



                    CONSULTING RESIDENT: Jazmyn Turner MD                     



                    REASON FOR CONSULT: Evaluate for disc edema                 

    



                    CHART REVIEWED: YES                     



                                        HISTORY OF PRESENT ILLNESS: The patient 

is a patient with PMH of spina bifida 

with  shunt with several week history of severe headaches. Ophthalmology 
consultated to rule out disc edema.                           



                    REVIEW OF SYSTEMS:                      



                    CONSTITUTIONAL: Denies fever, weight changes.               

      



                    MUSCULOSKELETAL: Denies generalized pain                    

 



                    SKIN: Denies rash.                      



                    EYES: As above.                         



                    ENT: Denies rhinorrhea, sore throat or hearing loss         

            



                    RESPIRATORY: Denies SOB.                     



                    CARDIOVASCULAR: Denies chest pain.                     



                    GASTROINTESTINAL: Denies nausea, vomiting, diarrhea.        

             



                    HEMATOPOETIC/LYMPHATIC: Denies bruising, LAD.               

      



                    GENITOURINARY: Denies change in UOP.                     



                    NEUROLOGICAL: Denies headache.                     



                    PSYCHIATRIC: Denies behavioral change.                     



                    ALLERGY/IMMUNE SYSTEM: Denies allergies.                    

 



                    PAST OCULAR HISTORY: per HPI                     



                    PAST MEDICAL HISTORY: per HPI                     



                    PAST SURGICAL HISTORY: no past ocular surgeries             

        



                    SOCIAL HISTORY: no etoh/smoking/drugs                     



                    FAMILY HISTORY: no ocular history                     



                    ALLERGIES: nkda                         



                    MEDICATIONS: none                       



                    EYE MEDICATION: none                     



                    EXAMINATION                             



                    NEURO/MS                                



                                        The patient is Alert but drowsy, no foca

l neurological or motor deficits, the 

patient was able to participate fully in the exam                           



                    VISUAL ACUITY (WITHOUT CORRECTION), TESTED ON A NEAR CARD   

                  



                    Right: 20/40 ( poor effort 2/2 severe headaches and drowsine

ss)                     



                    Left:  20/40 ( poor effort 2/2 severe headaches and drowsine

ss)                     



                    EXTRAOCULAR MOTILITY                     



                    Right: Full                             



                    Left: Full                              



                    CONFRONTATION VISUAL FIELD                     



                    Right: Full                             



                    Left: Full                              



                    COLOR SATURATION                        



                    Patient had no decrease in red color intensity between eyes 

                    



                    Right:   [address]                     



                    Left:  [address]                     



                    PUPILS                                  



                    Right: 4mm to 2mm, No afferent pupillary defect noted       

              



                    Left:  4mm to 2mm, No afferent pupillary defect noted       

              



                    INTRAOCULAR PRESSURE                     



                                        Symmetric and normal to palpation both e

yes. Right eye 23, left eye 26 using the

Tonopen.( patient squeezing)                           



                    EXTERNAL                                



                    Right: Within normal limits                     



                    Left: Within normal limits                     



                    ANTERIOR SEGMENT EXAM:                     



                    LIDS/LASHES/LACRIMALS                     



                    Right: Within normal limits                     



                    Left: Within normal limits                     



                    CONJUNCTIVA/SCLERA                      



                    Right: White and quiet                     



                    Left: White and quiet                     



                    CORNEA                                  



                    Right: Clear without epi defect                     



                    Left: Clear without epi defect                     



                    ANTERIOR CHAMBER                        



                    Right: Formed and grossly clear, no hyphema                 

    



                    Left: Formed and grossly clear, no hyphema                  

   



                    IRIS                                    



                    Right: Round and reactive                     



                    Left: Round and reactive.                     



                    LENS                                    



                    Right: Clear                            



                    Left: Clear                             



                                        DILATED FUNDUS EXAM: (Both eyes dilated 

with phenylephrine 2.5% and tropicamide 

1% @ 8:30AM)                                        



                    OPTIC NERVE:                            



                    Right: Pink, healthy nerve                     



                    Left: Pink, healthy nerve                     



                    C:D RATIO                               



                    Right: 0.2                              



                    Left: 0.2                               



                    POSTERIOR SEGMENT                       



                    Right: Macula, vessels, periphery within normal limits      

               



                    Left: Macula, vessels, periphery within normal limits       

              



                    DIAGNOSES/RECOMMENDATION:                     



                    1. Spina bifida                         



                    - No evidence of disc edema                     



                    - Management as per neuro-surgery                     



                                        The patient has been given information t

o call for an appointment to follow-up 

with Alla Goldberg, MD at the [facility] in in 3-4months. (Located: [address] [place] [address]; Appt #: [phone]).                           



                    Please re-consult if any changes develop.                   

  



                    Thank you for the consult.                     



                    Jazmyn Turner MD                        



                    Ophthalmology, PGY-2                     



                    Faculty note:                           



                                        Patient exam reviewed with the resident.

 I agree with above assessment and plan.

 Any additional findings or changes are detailed below.                         

  



                    examined for headaches. no evidence of papilledema          

           



                                                            

 

                    Extracted from:Title: Progress Note 2018          [facility]



                    Author: Deedee Reinoso MD                     



                    Date: 18                            



                              1.Acute headache,Acute headache                   

  



                                         possibly related to  infection, diffe

rential includes HA, neuropathy, and 

narcotic withdrawal                                 



                                         -Discussed with neurosurgery today do n

ot believe that there is any role for 

intervention, no evidence of infection, shunt is appropriate, no evidence of 
optic disc changes                                  



                     Discussed this with the patient                     



                     2. (ventriculoperitoneal) shunt status                   

  



                     Appears to be functioning well and decompressing the ventri

cles                     



                      3.Acute UTI                           



                                         Will discontinue vancomycin continue Zo

syn for the patient's prior 

osteomyelitis                                       



                     Urine cultures are negative                     



                     4.Decubitus ulcer                      



                     present on admission, evaluated by wound care, RN following

 recs daily                     



                                         -[facility]

 [facility] 

tomorrow.                                           



                     5.Acute pain                           



                                         MMP regimen implemented by [facility], discus

sed recommendations with team today, 

will add NSAID and trazodone for sleep                           



                       Lovenox                              



                                                            



                                                            



                    Extracted from:Title: [facility] Consult Note                     



                    Author: Cassie Traore MD                     



                    Date: 18                            



                                                            



                                        Patient:   JORDAN VERDIN JR       

     MRN: [id number]            FIN: 

[id number]                                        



                    Age:   32 years     Sex:  Male     :  1985         

            



                    Associated Diagnoses:   None                     



                    Author:   Cassie Traore MD                     



                    Basic Information                       



                    Referral source                         



                    Reason for consultation: Complex Acute Pain                 

    



                    Chief Complaint                         



                                        2018 17:41         transfer from [facility] for  shunt 

malfunction/enlarged ventricles c/o HA/intermittent blurred vision x2 days 
moving extremeties bilaterally equally not amb per baseline no d                

           



                    istress noted pmh cp lymphedema spina bifida multiple pressu

re ulcers                     



                    History of Present Illness                     



                              The patient presents with Location: _             

        



                    Quality: _                              



                    Severity: _                             



                    Timing: _                               



                    Duration: _                             



                    Context: _                              



                    Modifying factors: _                     



                                         , worsening headache c/b nausea, vomiti

ng in setting of prior  shunt. Per 

primary team no planned neurosurgical intervention at this time. consulted for 
help in managing patient's headache .                           



                    Histories                               



                    Past Medical History:                     



                    Resolved                                



                    Asthma (784708701):  Resolved.                     



                    Bronchitis (72327956):  Resolved.                     



                    Cerebral palsy (440800948):  Resolved.                     



                    Hydrocephalus (293821751):  Resolved.                     



                    Osteomyelitis (51589306):  Resolved.                     



                    Family History:                         



                    No family history items have been selected or recorded.     

                



                    Procedure history:                      



                    Cholecystectomy (SNOMED CT 70563588).                     



                    Shunt of cerebral ventricle to extracranial site (SNOMED CT 

007376213).                     



                    Social History                          



                    Social and Psychosocial Habits                     



                    Tobacco                                 



                    2018  Use: Current every day smoker                   

  



                      Exposure to Tobacco Smoke Lives with someone who sm       

              



                      Cigarette Smoking Last 365 Days Yes                     



                      Reg Smoking Cessation Counseling Yes                     



                    .                                       



                    Health Status                           



                    Allergies:                              



                    Allergic Reactions (All)                     



                    Severity Not Documented                     



                    Amoxicillin- No reactions were documented.                  

   



                    Bactrim- No reactions were documented.                     



                    Levaquin- No reactions were documented.                     



                    Minocin- No reactions were documented.                     



                    Morphine- No reactions were documented.                     



                    Toradol- No reactions were documented.                     



                    Zofran- No reactions were documented.                     



                    Canceled/Inactive Reactions (All)                     



                    Severity Not Documented                     



                    Vancomycin- No reactions were documented.,                  

   



                    Allergies (7) Active        Reaction                     



                    amoxicillin        None Documented                     



                    Bactrim        None Documented                     



                    Levaquin        None Documented                     



                    Minocin        None Documented                     



                    morphine        None Documented                     



                    Toradol        None Documented                     



                    Zofran        None Documented                     



                    Current medications:  (Selected)                     



                    Inpatient Medications                     



                    Ordered                                 



                    Beneprotein 7 gm pkt: 2 pkt, PO, BID-Before Meals           

          



                    Lidoderm 5% topical film (patch): 3 patch, TOP, Daily       

              



                    Robaxin: 500 mg, 1 tab, PO, TID                     



                    Saline Flush 0.9%: 10 ml, IVP, PRN, PRN: Line Flush         

            



                    Sodium Chloride 0.9% IV 1,000 mL: 125 ml/hr, IV, Stop:  1:45:00 CDT                     



                    Zosyn: 3.375 gm, IVPB, ABXQ6H                     



                    acetaminophen: 1,000 mg, 2 tab, PO, Q6Hnow                  

   



                    ascorbic acid: 500 mg, 1 tab, PO, BID                     



                    docusate: 100 mg, 1 cap, PO, BID                     



                    enoxaparin: 40 mg, 0.4 mL, SUB-Q, [id number]                  

   



                    gabapentin: 300 mg, 1 cap, PO, Q8Hnow                     



                    multivitamin: 1 tab, PO, Daily                     



                    normal saline 0.9% IV 1,000 mL: 75 ml/hr, IV, Stop: 18

 22:38:00 CDT                     



                    oxyCODONE 5 mg immediate release: 10 mg, 2 tab, PO, Q4H, PRN

: Pain Score 7-10                     



                    oxyCODONE 5 mg immediate release: 5 mg, 1 tab, PO, Q4H, PRN:

 Pain Score 4-6                     



                    remove patch: 3 patch, TOP, Bedtime                     



                    tramadol: 100 mg, 2 tab, PO, Q6Hnow                     



                    vancomycin: 1,000 mg, IVPB, ABXQ8H                     



                    zinc sulfate: 220 mg, 1 cap, PO, Daily                     



                    Documented Medications                     



                    Documented                              



                    Percocet 10/325 oral tablet: 1 tab, PO, TID, 0 Refill(s),   

                  



                    Medications (19) Active                     



                    Scheduled: (14)                         



                    acetaminophen 500 mg TAB  1,000 mg 2 tab, PO, Q6Hnow        

             



                    ascorbic acid 500 mg TAB  500 mg 1 tab, PO, BID             

        



                    docusate sodium 100 mg CAP  100 mg 1 cap, PO, BID           

          



                    enoxaparin 40 mg/0.4 ml INJ  40 mg 0.4 mL, SUB-Q, [id number]  

                   



                    gabapentin 300 mg CAP  300 mg 1 cap, PO, Q8Hnow             

        



                    lidocaine 5% top patch  3 patch, TOP, Daily                 

    



                    lidocaine patch removal  3 patch, TOP, Bedtime              

       



                    methocarbamol 500 mg TAB  500 mg 1 tab, PO, TID             

        



                    multiple vit Therapeutic TAB  1 tab, PO, Daily              

       



                    piperacillin-tazobactam 3.375 gm INJ VL  3.375 gm, IVPB, ABX

Q6H                     



                    traMADol 50 mg TAB  100 mg 2 tab, PO, Q6Hnow                

     



                    vancomycin 1 gm INJ VL  1,000 mg, IVPB, ABXQ8H              

       



                                        whey protein isolate - soy lecithin (Arthur

eprotein) 7 gm pkt  2 pkt, PO, BID-

Before Meals                                        



                    zinc sulfate 220 mg (zinc elemental 50mg) CAP  220 mg 1 cap,

 PO, Daily                     



                    Continuous: (2)                         



                    sodium chloride 0.9% 1000 ml INJ 1,000 mL  1,000 mL, IV, 75 

ml/hr                     



                    sodium chloride 0.9% 1000 ml INJ 1,000 mL  1,000 mL, IV, 125

 ml/hr                     



                    PRN: (3)                                



                    oxyCODONE IR 5 mg TAB  5 mg 1 tab, PO, Q4H                  

   



                    oxyCODONE IR 5 mg TAB  10 mg 2 tab, PO, Q4H                 

    



                    sodium chloride 0.9% 10 ml flush syr BD  10 ml, IVP, PRN    

                 



                    Problem list:                           



                    All Problems                            



                    Acute pain / SNOMED CT 225420470 / Confirmed                

     



                    Headache / SNOMED CT 37686411 / Confirmed                   

  



                    Review of Systems                       



                    Constitutional                          



                    Cardiovascular                          



                    Ear/Nose/Mouth/Throat                     



                    Respiratory:  Negative.                     



                    Gastrointestinal:  Nausea.                     



                    Musculoskeletal                         



                    Neurologic:  headache,  shunt in place. +nausea.          

           



                    Psychiatric                             



                    Endocrine                               



                    Hematology/Lymphatics                     



                    Physical Examination                     



                    VS/Measurements                         



                    Measurements from flowsheet : Measurements                  

   



                    2018 10:20                        



                    Heparin Dosing Weight (kg)                     



                    86.33                                   



                    2018 09:00                        



                    Weight                                  



                    127.027 kg                              



                    Dosing Weight Difference Percent                     



                    -0.191 %                                



                    Dosing Weight Collection Method                     



                    Estimated                               



                    2018 00:44                        



                    Heparin Dosing Weight (kg)                     



                    86.43                                   



                    2018 00:43                        



                    Height                                  



                    162.56 cm                               



                    Height Collection Method                     



                    Estimated                               



                    Weight                                  



                    127.27 kg                               



                    Dosing Weight Difference Percent                     



                    -0.002 %                                



                    Dosing Weight Collection Method                     



                    Estimated                               



                    Body Surface Area                       



                    2.3973 m2                               



                    Body Mass Index                         



                    48.16 m2                                



                    2018 17:47                        



                    Heparin Dosing Weight (kg)                     



                    79.53                                   



                    2018 17:41                        



                    Height                                  



                    149.86 cm                               



                    Height Collection Method                     



                    Stated                                  



                    Weight                                  



                    127.273 kg                              



                    Dosing Weight Difference Percent                     



                    -2.439 %                                



                    Dosing Weight Collection Method                     



                    Measured                                



                    Body Surface Area                       



                    2.3018 m2                               



                    Body Mass Index                         



                    56.67 m2                                



                    ,                                       



                    Vital Signs (last 24 hrs)_____        Last Charted__________

_                     



                    Temp Oral        98.9 DegF  (MAY 04 19:52)                  

   



                    Heart Rate Peripheral        H 107bpm  (MAY 04 19:52)       

              



                    Resp Rate            17 BRMIN  (MAY 04 19:52)               

      



                    SBP        134 mmHg  (MAY 04 19:52)                     



                    DBP        81 mmHg  (MAY 04 19:52)                     



                    SpO2        94 %  (MAY 04 19:52)                     



                    Weight        127.02 kg  (MAY 04 09:00)                     



                    Height        162.56 cm  (MAY 04 00:43)                     



                    BMI        48.16  (MAY 04 00:43)                     



                    HENT:   shunt in situ, headache.                     



                    Review / Management                     



                    Results review:                         



                    Labs (Last four charted values)                     



                    WBC                     7.4    (MAY 04)    H 11.2    (MAY 03

)                     



                    Hgb                     15.8    (MAY 04)    16.4    (MAY 03)

                     



                    Hct                     48.9    (MAY 04)    49.8    (MAY 03)

                     



                    Plt                     397    (MAY 04)    408    (MAY 03)  

                   



                    Na                      139    (MAY 04)    140    (MAY 03)  

                   



                    K                       4.5    (MAY 04)    4.1    (MAY 03)  

                   



                    CO2                     L 21    (MAY 04)    L 21    (MAY 03)

                     



                    Cl                      107    (MAY 04)    109    (MAY 03)  

                   



                    Cr                      0.70    (MAY 04)    0.56    (MAY 03)

                     



                    BUN                     12    (MAY 04)    9    (MAY 03)     

                



                    Glucose Random          78    (MAY 04)    78    (MAY 03)    

                 



                    Mg                      2.3    (MAY 04)                     



                    Phos                    3.5    (MAY 04)                     



                    Ca                      9.1    (MAY 04)    L 7.8    (MAY 03)

                     



                    PT                      14.0    (MAY 04)    13.0    (MAY 03)

                     



                    INR                     1.08    (MAY 04)    0.98    (MAY 03)

                     



                    PTT                     H 37.6    (MAY 04)    33.2    (MAY 0

3)    .                     



                    Chest x-ray results                     



                    ECG interpretation                      



                    Impression and Plan                     



                    Diagnosis                               



                    Acute headache ([id number], Working, Medical).            

         



                    Course:  Worsening.                     



                    Orders                                  



                                        Education and Follow-up:       Counseled

: Patient, Regarding treatment, 

Regarding medications.                              



                    Professional Services                     



                                        32M hx CP,  shunt, chronic foot pressu

re ulcers. Consulted for management of 

acute headache.                                     



                                        - Necessary to work up etiology of heada

ches. Per primary team, no current 

neurosurgical intervention planned. Recommend neurology consult if primary team 
concerned for migraines per notes.                           



                    - initiated multimodal pain regimen.                     



                    - APMS will continue to follow                     



                    Cassie Traore MD PGY3                     



                    Addendum by Scott Menendez MD on 2018 09:19             

        



                                        I have personally evaluated this patient

 and discussed the plan of care with 

this resident. I have reviewed the note below and agree with the history, 
examination findings, assessment and plan.                           



                                                            



                    Extracted from:Title: History and Physical                  

   



                    Author: Romi Bai MD                     



                    Date: 18                            



                                                            



                                                            



                                        Patient is a 32-year-old with spina bifi

daand hydrocephalus treated by  shunt 

who presented to [facility] emergency room complaining of headache and blurry 
vision. He was there 2 days and transferredt                           



                                        o [facility] for higher level 

of care. Here he was evaluate by 

ophthalmology and neurosurgery. There is no surgical interventionwarranted at 
this time. He has evidence of UTI andpossibly infec                           



                                        tedheel and sacral decubiti that were pr

esent on admission. CRP is 15. We will 

need to treat with aggressive antibiotic regimen, wound care, and documentsthere
is no systemic infection beforeany CSF can                           



                                         be obtained from his  shunt. Wewill t

ry to avoid any IV narcotics,manage his 

pain with oral analgesicsas he is toleratingall his meals,and de-escalate his 
antibiotics as soon as possible.                           



                                                            



                    1.Acute headache                        



                                        Patient has headaches that may bedue to 

infection of his  system, however the 

differential also includes migraine, neuropathy and narcotics withdrawal.       

                    



                     2. (ventriculoperitoneal) shunt status                   

  



                    Appears to be functioning well and decompressing the ventric

les                     



                    We will avoid anyinterventionand access until systemic infec

tion is ruled out                     



                     3.Acute UTI                            



                    Broad-spectrum antibiotic coverage until systemic infection 

is ruled out                     



                     4.Decubitus ulcer                      



                    Present on admission                     



                    We will ask wound care to evaluate and prescribe treatment  

                   



                                                            



                                                            



                                                            



                      Lovenox                               



                      Home once etiology of headache is known                   

  



                    [facility] is primary. Page [phone] c

oncerns.                     



                                                            



                                                            







Plan of Care







                                        No Data Provided for This Section







Social History







                    Social History      Date                Source



                                                            

 

                    Social History TypeResponse 2018          [facility]

o



                    Smoking Status                          



                                        Current every day smoker; Type: Cigarett

es; Lives with someone who smokes; 

Cigarette Smoking Last 365 Days Yes; Reg Smoking Cessation Counseling No        

                   



                    entered on: 18                     



                                                            

 

                    Social History TypeResponse 2018          [facility]



                    Smoking Status                          



                                        Current every day smoker; Type: Cigarett

es; Lives with someone who smokes; 

Cigarette Smoking Last 365 Days Yes; Reg Smoking Cessation Counseling No        

                   



                    entered on: 18                     



                                                            







Family History







                                        No Data Provided for This Section







Advance Directives







                                        No Data Provided for This Section







Functional Status







                                        No Data Provided for This Section

## 2021-01-15 NOTE — XMS REPORT
Continuity of Care Document

                           Created on:January 15, 2021



Patient:JORDAN VERDIN JR

Sex:Male

:1985

External Reference #:[id number]





Demographics







                          Address                   [address]



                                                    [place] [address]

 

                          Home Phone                [phone] FAC

 

                          Work Phone                [phone]

 

                          Mobile Phone              [phone]

 

                          Email Address             DECLINED

 

                          Preferred Language        English

 

                          Marital Status            Unknown

 

                          Latter day Affiliation     Unknown

 

                          Race                      Unknown

 

                          Additional Race(s)        Unavailable



                                                    Black or 



                                                    Unavailable

 

                          Ethnic Group              Unknown









Author







                          Organization              [facility]

t

 

                          Address                   [address]



                                                    [place] [address]

 

                          Phone                     [phone]









Support







                Name            Relationship    Address         Phone

 

                Chito          Mother          [address] [phone]

[phone]



                                                [place] [address] 









Care Team Providers







                    Name                Role                Phone

 

                    Sharpless           Primary Care Physician [phone]

 

                    Ashia MEADOWS          Attending Clinician [phone]

 

                    Darby MEADOWS        Attending Clinician [phone]

 

                    Rafi MEADOWS           Attending Clinician [phone]

 

                    ASHIA             Attending Clinician Unavailable

 

                    GAYLE Linares       Attending Clinician [phone]

 

                    Jamie Reinoso         Attending Clinician [phone]

 

                    RALPH TORRES         Attending Clinician Unavailable

 

                    RAFI              Admitting Clinician Unavailable

 

                    Saw Ríos      Admitting Clinician [phone]

 

                    Jamie Reinoso         Admitting Clinician [phone]

 

                    RALPH TORRES         Admitting Clinician Unavailable









Payers







           Payer Name Policy Type Policy     Effective Date Expiration Date Sour

ce



                                 Number                           

 

           MEDICAID -            [id number]  [id number]            [facility]



           MEDICAID MGD                       00:00:00              - Medical



           [facility]



           STAR                                                   



           [id number]                                             



           aid Contracted                                             







Problems







       Condition Condition Condition Status Onset  Resolution Last   Treating Co

mments 

Source



       Name   Details Category        Date   Date   Treatment Clinician        



                                                 Date                 

 

       Hyperglyce Hyperglyce Disease Active                              C

[facility] 



       jarvis    jarvis                                                 [facility]



       without without               00:00:                             Medical



       ketosis ketosis                                                Center

 

       HEADACHE        Diagnosis Active 2018               M

[facility]



                                             22:05:00               l



                HEADACHE               00:00:                             [facility]

n



                                   00                                 



                                                                      



               Active                                                  



                                                                      



                                                                      



              2018                                                  



                                                                      



                                                                      



                                                                      



                                                                      



                                                                      



                                                                      



                                                                      



                                                                      



                     [facility]                                                  



                                                                      



                                                                      

 

       SHUNT         Diagnosis Active 2018               Mem

oria



       MALFUNCTIO                                20:00:00               l



       N        SHUNT               00:00:                             [place]



              MALFUNCTIO               00                                 



              N                                                       



                                                                      



                 Active                                                  



                                                                      



                                                                      



              2018                                                  



                                                                      



                                                                      



                                                                      



                                                                      



                                                                      



                                                                      



                                                                      



                                                                      



                     [facility]                                                  



                                                                      



                                                                      

 

       ACUTE         Diagnosis Active 2018               Mem

oria



       HEADACHE                                09:20:00               l



                ACUTE               00:00:                             [place]



              HEADACHE               00                                 



                                                                      



                                                                      



              Active                                                  



                                                                      



                                                                      



              2018                                                  



                                                                      



                                                                      



                                                                      



                                                                      



                                                                      



                                                                      



                                                                      



                                                                      



                     [facility]                                                  



                                                                      



                                                                      

 

       Spina  Spina  Disease Active                              [facility]



       bifida bifida                                              [facility]



                                   00:00:                             Medical



                                   00                                 [place]

 

       Pyelonephr Pyelonephr Disease Active                              C

[facility]



       itis   itis                                                [facility]



                                   00:00:                             Medical



                                   00                                 [place]

 

       Nicotine Nicotine Problem Active                                    [facility]

t



       dependence dependence                                                  [facility]

[facility]



                                                                      [facility]



                                                                      [facility]



                                                                      [facility]

 

       Lumbar Lumbar Problem Active                                    [facility]



       spina  spina                                                   [facility]



       bifida bifida                                                  [facility]



       with   with                                                    l



       hydrocepha hydrocepha                                                  Ou

tpati



       ozzy    ozzy                                                     ent



                                                                      Clinics

 

       Dependence Dependence Problem Active                                    C

[facility]



       on     on                                                      [facility]



       wheelchair wheelchair                                                  Me

[facility]



                                                                      [facility]



                                                                      [facility]

 

       Fecal  Fecal  Problem Active                                    [facility]



       incontinen incontinen                                                  [facility]

[facility]



       ce     ce                                                      [facility]



                                                                      [facility]

 

       Foot ulcer Foot ulcer Problem Active                                    [facility]

[facility]



                                                                      [facility]



                                                                      [facility]

 

       Depression Depression Problem Active                                    [facility]

[facility]



       with   with                                                    [facility]



       anxiety anxiety                                                  [facility]



                                                                      [facility]

 

       Paraplegia Paraplegia Problem Active                                    [facility]

[facility]



                                                                      [facility]



                                                                      [facility]

 

       Constipati Constipati Problem Active                                    [facility]

[facility]



       on     on                                                      [facility]



                                                                      [facility]



                                                                      [facility]

 

       Incontinen Incontinen Problem Active                                    [facility]

[facility]



       ce of  ce of                                                   [facility]



       urine  urine                                                   [facility]



                                                                      [facility]

 

       Nausea Nausea Problem Active                                    [facility]



       alone  alone                                                   [facility]

 

       Episodic Episodic Problem Active                                    [facility]

t



       tension-ty tension-ty                                                  [facility]

[facility] -



       pe     pe                                                      [facility]



       headache, headache,                                                  l



       not    not                                                     Outpati



       intractabl intractabl                                                  en

t



       e      e                                                       Clinics

 

                 Problem Active                                    [facility]



       (ventricul (ventricul                                                  [facility]

[facility] -



       operitonea operitonea                                                  Me

moria



       l) shunt l) shunt                                                  l



       status status                                                  [facility]



                                                                      [facility]

 

       Nonintract Nonintract Problem Active                                    [facility]

[facility]



       able   able                                                    [facility]



       episodic episodic                                                  [facility]

a



       headache, headache,                                                  l



       unspecifie unspecifie                                                  Ou

tpati



       d headache d headache                                                  en

t



       type   type                                                    Clinics

 

       Mental Mental Problem Active                                    [facility]



       disorder, disorder,                                                  [facility]

s -



       not    not                                                     [facility]



       otherwise otherwise                                                  l



       specified specified                                                  Outp

ati



                                                                      ent



                                                                      Clinics

 

       Insomnia Insomnia Problem Active                                    [facility]

t



       due to due to                                                  [facility]



       other  other                                                   [facility]



       mental mental                                                  l



       disorder disorder                                                  Outpat

i



                                                                      ent



                                                                      Clinics

 

       Ulcer of Ulcer of Problem Active                                    [facility]

t



       left foot, left foot,                                                  [facility]

[facility] -



       unspecifie unspecifie                                                  Me

moria



       d ulcer d ulcer                                                  l



       stage  stage                                                   Outpati



                                                                      ent



                                                                      Clinics

 

       Bipolar Bipolar Problem Active                                    [facility]



       depression depression                                                  [facility]

[facility]



                                                                      [facility]



                                                                      l



                                                                      Outpati



                                                                      ent



                                                                      Clinics

 

       Nausea and Nausea and Problem Active                                    [facility]

[facility]



       vomiting, vomiting,                                                  [facility]

s -



       intractabi intractabi                                                  Me

moria



       lity of lity of                                                  l



       vomiting vomiting                                                  Outpat

i



       not    not                                                     ent



       specified, specified,                                                  Cl

inics



       unspecifie unspecifie                                                  



       d vomiting d vomiting                                                  



       type   type                                                    

 

       Blood  Blood  Problem Active                                    [facility]



       tests for tests for                                                  [facility]

s -



       routine routine                                                  [facility]



       general general                                                  l



       physical physical                                                  Outpat

i



       examinatio examinatio                                                  en

t



       n      n                                                       Clinics

 

       Spina         Problem                      2019               Mem[facility]

ia



       bifida,                                    14:14:02               l



       unspecifie   Spina                                                  [facility]

nn



       d      bifida,                                                  



              unspecifie                                                  



              d                                                       



                                                                      



                                                                      



                                                                      



                                                                      



                                                                      



                                                                      



                                                                      



                                                                      



              2019                                                  



                                                                      



                                                                      



                                                                      



                                                                      



                 [facility]                                                  



                                                                      



                                                                      

 

       Nausea        Problem                      2019               Memor

ia



       with                                      14:14:02               l



       vomiting,   Nausea                                                  [facility]

nn



       unspecifie with                                                    



       d      vomiting,                                                  



              unspecifie                                                  



              d                                                       



                                                                      



                                                                      



                                                                      



                                                                      



                                                                      



                                                                      



                                                                      



                                                                      



              2019                                                  



                                                                      



                                                                      



                                                                      



                                                                      



                 [facility]                                                  



                                                                      



                                                                      

 

       Diplopia        Problem                      2019               Mem

oria



                                                 14:14:02               l



                Diplopia                                                  [facility]

n



                                                                      



                                                                      



                                                                      



                                                                      



                                                                      



                                                                      



                                                                      



                                                                      



                                                                      



              2019                                                  



                                                                      



                                                                      



                                                                      



                                                                      



                 [facility]                                                  



                                                                      



                                                                      

 

       Cerebral        Problem                      2019               [facility]

[facility]



       palsy,                                    14:14:02               l



       unspecifie   Cerebral                                                  He

rmann



       d      palsy,                                                  



              unspecifie                                                  



              d                                                       



                                                                      



                                                                      



                                                                      



                                                                      



                                                                      



                                                                      



                                                                      



                                                                      



              2019                                                  



                                                                      



                                                                      



                                                                      



                                                                      



                 [facility]                                                  



                                                                      



                                                                      

 

       Acquired        Problem                      2019               [facility]

[facility]



       absence of                                    14:14:02               l



       other    Acquired                                                  [place]

n



       specified absence of                                                  



       parts of other                                                   



       digestive specified                                                  



       tract  parts of                                                  



              digestive                                                  



              tract                                                   



                                                                      



                                                                      



                                                                      



                                                                      



                                                                      



                                                                      



                                                                      



                                                                      



                                                                      



              2019                                                  



                                                                      



                                                                      



                                                                      



                                                                      



                 [facility]                                                  



                                                                      



                                                                      

 

       Nicotine        Problem                      2019               [facility]

[facility]



       dependence                                    14:14:02               l



       ,        Nicotine                                                  [place]ig[facility] malloy



       cigarettes dependence                                                  



       ,      ,                                                       



       uncomplica cigarettes                                                  



       manjeet    ,                                                       



              uncomplica                                                  



              manjeet                                                     



                                                                      



                                                                      



                                                                      



                                                                      



                                                                      



                                                                      



                                                                      



                                                                      



              2019                                                  



                                                                      



                                                                      



                                                                      



                                                                      



                 [facility]                                                  



                                                                      



                                                                      

 

       Presence        Problem                      2019               [facility]

[facility]



       of                                        14:14:02               l



       cerebrospi   Presence                                                  He

rmann



       nal fluid of                                                      



       drainage cerebrospi                                                  



       device nal fluid                                                  



              drainage                                                  



              device                                                  



                                                                      



                                                                      



                                                                      



                                                                      



                                                                      



                                                                      



                                                                      



                                                                      



                                                                      



              2019                                                  



                                                                      



                                                                      



                                                                      



                                                                      



                 [facility]                                                  



                                                                      



                                                                      

 

       Allergy        Problem                      2019               [facility]

rajeev



       status to                                    14:14:02               l



       other    [facility]                                                  [place]



       antibiotic status to                                                  



       agents other                                                   



       status antibiotic                                                  



              agents                                                  



              status                                                  



                                                                      



                                                                      



                                                                      



                                                                      



                                                                      



                                                                      



                                                                      



                                                                      



                                                                      



              2019                                                  



                                                                      



                                                                      



                                                                      



                                                                      



                 [facility]                                                  



                                                                      



                                                                      

 

       Allergy        Problem                      2019               [facility]

rajeev



       status to                                    14:14:02               l



       other    [facility]                                                  [place]



       drugs, status to                                                  



       medicament other                                                   



       s and  drugs,                                                  



       biological medicament                                                  



       substances s and                                                   



       status biological                                                  



              substances                                                  



              status                                                  



                                                                      



                                                                      



                                                                      



                                                                      



                                                                      



                                                                      



                                                                      



                                                                      



                                                                      



              2019                                                  



                                                                      



                                                                      



                                                                      



                                                                      



                 [facility]                                                  



                                                                      



                                                                      

 

       Allergy        Problem                      2019               [facility]

rajeev



       status to                                    14:14:02               l



       narcotic   [facility]ergy                                                  [place]

nn



       agent  status to                                                  



       status narcotic                                                  



              agent                                                   



              status                                                  



                                                                      



                                                                      



                                                                      



                                                                      



                                                                      



                                                                      



                                                                      



                                                                      



                                                                      



              2019                                                  



                                                                      



                                                                      



                                                                      



                                                                      



                 [facility]                                                  



                                                                      



                                                                      

 

       Asthma        Problem Resolve               2019               [facility]

rajeev



       (disorder)               d                    01:05:55               l



                Asthma                                                  [place]



              (disorder)                                                  



                                                                      



                                                                      



               Resolved                                                  



                                                                      



                                                                      



                                                                      



                                                                      



                Problem                                                  



                                                                      



                                                                      



              2019                                                  



                                                                      



                                                                      



                                                                      



                                                                      



                 [facility]                                                  



                                                                      



                                                                      

 

       Bronchitis        Problem Resolve               2019               

[facility]



       (disorder)               d                    01:05:55               l



                                                                      [place]



              Bronchitis                                                  



              (disorder)                                                  



                                                                      



                                                                      



               Resolved                                                  



                                                                      



                                                                      



                                                                      



                                                                      



                Problem                                                  



                                                                      



                                                                      



              2019                                                  



                                                                      



                                                                      



                                                                      



                                                                      



                 [facility]                                                  



                                                                      



                                                                      

 

       Cerebral        Problem Resolve               2019               Me

moria



       palsy                d                    01:05:55               l



       (disorder)   Cerebral                                                  He

rmann



              palsy                                                   



              (disorder)                                                  



                                                                      



                                                                      



               Resolved                                                  



                                                                      



                                                                      



                                                                      



                                                                      



                Problem                                                  



                                                                      



                                                                      



              2019                                                  



                                                                      



                                                                      



                                                                      



                                                                      



                 [facility]                                                  



                                                                      



                                                                      

 

       Hydrocepha        Problem Resolve               2019               

[facility]



       ozzy                  d                    01:05:55               l



       (disorder)                                                         [facility]

n



              Hydrocepha                                                  



              ozzy                                                     



              (disorder)                                                  



                                                                      



                                                                      



               Resolved                                                  



                                                                      



                                                                      



                                                                      



                                                                      



                Problem                                                  



                                                                      



                                                                      



              2019                                                  



                                                                      



                                                                      



                                                                      



                                                                      



                 [facility]                                                  



                                                                      



                                                                      

 

       Osteomyeli        Problem Resolve               2019               

[facility]



       tis                  d                    01:05:55               l



       (disorder)                                                         [facility]

n



              Osteomyeli                                                  



              tis                                                     



              (disorder)                                                  



                                                                      



                                                                      



               Resolved                                                  



                                                                      



                                                                      



                                                                      



                                                                      



                Problem                                                  



                                                                      



                                                                      



              2019                                                  



                                                                      



                                                                      



                                                                      



                                                                      



                 [facility]                                                  



                                                                      



                                                                      

 

       Acute pain        Problem Active               2019               M

[facility]



       (finding)                                    01:05:55               l



                Acute                                                  Jose



              pain                                                    



              (finding)                                                  



                                                                      



                                                                      



              Active                                                  



                                                                      



                                                                      



                                                                      



                                                                      



              Problem                                                  



                                                                      



                                                                      



              2019                                                  



                                                                      



                                                                      



                                                                      



                                                                      



                 [facility]                                                  



                                                                      



                                                                      

 

       Headache        Problem Active               2019               [facility]

oria



       (finding)                                    01:05:55               l



                Headache                                                  [facility]

n



              (finding)                                                  



                                                                      



                                                                      



              Active                                                  



                                                                      



                                                                      



                                                                      



                                                                      



              Problem                                                  



                                                                      



                                                                      



              2019                                                  



                                                                      



                                                                      



                                                                      



                                                                      



                 [facility]                                                  



                                                                      



                                                                      

 

       Headache        Problem        -2019              

 [facility]



                                      14:14:02 14:14:02               l



                Headache               06:00:                             [facility]

n



                                   00                                 



                                                                      



                                                                      



                                                                      



                                                                      



              2018                                                  



                                                                      



                                                                      



                                                                      



                                                                      



                 [facility]                                                  



                                                                      



                                                                      







Allergies, Adverse Reactions, Alerts







       Allergy Allergy Status Severity Reaction(s) Onset  Inactive Treating Comm

ents 

Source



       Name   Type                        Date   Date   Clinician        

 

       Sulfamet Propensi Active        Hives                        [facility]



       hoxazole ty to                                               [facility]



       -[facility] adverse                      00:00:                      Medical



       oprim  reaction                      00                          Center



              s                                                       

 

       Levoflox Propensi Active        Hives                        [facility]



       acin   ty to                                               [facility]



              adverse                      00:00:                      Medical



              reaction                      00                          Center



              s                                                       

 

       Morphine Propensi Active        Hives                        [facility]



              ty to                                               [facility]



              adverse                      00:00:                      Medical



              reaction                      00                          Center



              s                                                       

 

       Sesame Propensi Active        Hives                        [facility]



       Seed   ty to                                               [facility]



              adverse                      00:00:                      Medical



              reaction                      00                          Center



              s                                                       

 

       Ketorola Propensi Active        Rash                         [facility]



       c      ty to                                               [facility]



              adverse                      00:00:                      Medical



              reaction                      00                          Center



              s                                                       

 

       Vancomyc Propensi Active        Rash                         [facility]



       in     ty to                                               [facility]



       Analogue adverse                      00:00:                      Medical



       s      reaction                      00                          Center



              s                                                       

 

       Ondanset Propensi Active        Nausea And                       [facility]



       jersey [facility] ty to                Vomiting                         [facility]



       (Pf)   adverse                      00:00:                      Medical



              reaction                      00                          Center



              s                                                       

 

       Morphine Adverse Active        Info Not                             [facility]

t



       Sulfate Reaction               Available                             [facility]

s -



       ER                                                             [facility]



                                                                      l



                                                                      Outpati



                                                                      ent



                                                                      Clinics

 

       Levaquin Adverse Active        Info Not                             [facility]

t



              Reaction               Available                             [facility]

 -



                                                                      [facility]



                                                                      l



                                                                      Outpati



                                                                      ent



                                                                      Clinics

 

       Zofran Adverse Active        Info Not                             [facility]



              Reaction               Available                             [facility]

 -



                                                                      [facility]



                                                                      l



                                                                      Outpati



                                                                      ent



                                                                      Clinics

 

       amoxicil amoxicil Active                                           [facility]

a



       bryn    bryn                                                     l



                                                                      [facility]

 

       morphine morphine Active                                           [facility]

a



                                                                      l



                                                                      [facility]

 

       Toradol Toradol Active                                           [facility]



                                                                      l



                                                                      [facility]

 

       Minocin Minocin Active                                           [facility]



                                                                      l



                                                                      [facility]

 

       Zofran Zofran Active                                           [facility]



                                                                      l



                                                                      [facility]

 

       Levaquin Levaquin Active                                           [facility]

a



                                                                      l



                                                                      [facility]

 

       Bactrim Bactrim Active                                           [facility]



                                                                      l



                                                                      [facility]







Social History







           Social Habit Start Date Stop Date  Quantity   Comments   Source

 

           History of tobacco                       Cigarette Smoker            

[facility]

 

           Sex Assigned At                                             [facility]



           Birth                                                  [facility]

 

           Cigarettes smoked 2017                       [facility] -



           current (pack per 00:00:00   00:00:00                         Medical

 Center



           day) - Reported                                             









                Smoking Status  Start Date      Stop Date       Source

 

                Social History  2018 11:26:10                 [facility]

child







Medications







       Ordered Filled Start  Stop   Current Ordering Indication Dosage Frequency

 Signature

                    Comments            Components          Source



     Medication Medication Date Date Medication? Clinician                (SIG) 

          



     Name Name                                                   

 

     buPROPion      2021- No             150mg QD   Take 1           [facility]



     (WELLBUTRIN      1-17 01-16                          tablet           [facility]

 -



     XL) 150 MG      00:00: 23:59                          (150 mg           Med

ical



     24 hr      00   :00                           total) by           Center



     tablet                                         mouth           



                                                  daily.           

 

     citalopram      2021- No             40mg QD   Take 1           [facility]



     (CELEXA) 40      1-17 -16                          tablet (40           L

ukes -



     MG tablet      00:00: 23:59                          mg total)           Me

dical



               00   :00                           by mouth           Center



                                                  daily.           

 

     oxyCODONE-a            Yes            1{tbl}      Take 1           [facility]



     cetaminophe      1-16                               tablet by           Ni

es -



     n         14:44:                               mouth           Medical



     (PERCOCET)      50                                 every 4           Center



      mg                                         (four)           



     per tablet                                         hours as           



                                                  needed for           



                                                  Pain.           

 

     zinc            Yes            5g   Q.5D Apply 5 g           [facility]



     oxide-yuan      1-16                               topically           Ni

es -



     latum      00:00:                               2 (two)           Medical



     (CRITIC-AID      00                                 times           Center



     ) 20-51 %                                         daily.           



     Pste                                                        



     topical                                                        



     paste                                                        

 

     meropenem            Yes            1g        Inject 1 g           [facility]



     (MERREM)      1-16                               intravenou           [facility]

 -



     MBP 1 gm in      00:00:                               sly every           M

edical



     100 mL NS      00                                 8 (eight)           [facility]

r



                                                  hours.           

 

     insulin            Yes            58U  Q.5D Inject 58           [facility]

t



     glargine      1-16                               Units           [facility] -



     (LANTUS)      00:00:                               subcutaneo           Med

ical



     100 unit/mL      00                                 usly 2           Center



     injection                                         (two)           



                                                  times           



                                                  daily Use           



                                                  as             



                                                  directed.           

 

     insulin      2021- No             0U        Inject           [facility]



     lispro      1-16 01-15                          0-12 Units           [facility] 

-



     (HUMALOG)      00:00: 23:59                          subcutaneo           M

edical



     100 unit/mL      00   :00                           usly 3           Center



     injection                                         (three)           



                                                  times           



                                                  daily           



                                                  before           



                                                  meals.           

 

     insulin      2021- No             25U       Inject 25           [facility]



     lispro      1-16 01-15                          Units           [facility]



     (HUMALOG)      00:00: 23:59                          subcutaneo           M

edical



     100 unit/mL      00   :00                           usly 3           Center



     injection                                         (three)           



                                                  times           



                                                  daily           



                                                  before           



                                                  meals.           

 

     traZODone      0 2020- No             100mg QD   Take 1           [facility]



     (DESYREL)      16 -15                          tablet           [facility]



     100 MG      00:00: 23:59                          (100 mg           Medical



     tablet      00   :00                           total) by           Center



                                                  mouth           



                                                  nightly           



                                                  for 30           



                                                  days.           

 

     normal      2018      No                       1,000 mL,           [facility]

a



     saline 0.9%                                     Rate: 100           l



     IV 1,000 mL      11:04:                               ml/hr,           [facility]

nguyen



                                                Infuse           



                                                  over: 10           



                                                  hr, Route:           



                                                  IV, Dosing           



                                                  Weight           



                                                  131.818           



                                                  kg, Total           



                                                  Volume:           



                                                  1,000,           



                                                  Start           



                                                  date:           



                                                  18           



                                                  5:04:00           



                                                  CST,           



                                                  Duration:           



                                                  30 day,           



                                                  Stop date:           



                                                  18           



                                                  5:03:00           



                                                  CST, 2.4,           



                                                  m2             

 

     Magnesium      2018      No                       Notes:           [facility]

a



     Sulfate                                     WASTE: F/P           l



               10:36:                               - Sink; E           [facility]



                                                -              



                                                  [facility]           



                                                  Trash Bin           

 

     Isolyte S      2018      No                       Notes:           [facility]

a



     PH-7.4                                     (Same as:           l



     (Bolus) IV      10:36:                               Isolyte S           

rmann



                                                PH 7.4)           

 

     Phenergan      2018      No                       12.5 mg,           [facility]

rajeev



               08                               0.5 mL,           l



               10:35:                               Route:           [facility]



               00                                 IVPB, Drug           



                                                  form: INJ,           



                                                  ONCE,           



                                                  Dosing           



                                                  Weight           



                                                  131.818,           



                                                  kg,            



                                                  Priority:           



                                                  STAT,           



                                                  Start           



                                                  date:           



                                                  18           



                                                  4:35:00           



                                                  CST, Stop           



                                                  date:           



                                                  18           



                                                  4:35:00           



                                                  CST            

 

     Citalopram Citalopram       Yes  [place]                1 tablet     

      [facility]



     Hydrobromid Hydrobromid 7-16                                              L

[facility] -



     e    e    00:00:                                              [facility]



               [facility]

 

     Seroquel Seroquel       Yes  [place]                1 tablet         

  [facility]



               7-16                                              [facility]



               00:00:                                              [facility]



               [facility]

 

     Docusate            Yes                      100 mg = 1           Mem

oria



     Sodium 100      5-08                               cap, PO,           l



     MG Oral      14:56:                               BID, 0           [facility]



     Capsule      00                                 Refill(s)           

 

     Zosyn            Yes                      0              [facility]



               5-08                               Refill(s)           l



               14:56:                                              [facility]



               00                                                

 

     celecoxib            Yes                      200 mg = 1           Me

moria



     200 mg oral      5-08                               cap, PO,           l



     capsule      14:56:                               BID, 0           [facility]



               00                                 Refill(s)           

 

     ascorbic            Yes                      500 mg = 1           Mem

oria



     acid      5-08                               tab, PO,           l



               14:56:                               BID, 0           [facility]



               00                                 Refill(s)           

 

     acetaminoph            Yes                      1,000 mg =           

[facility]



     en 500 mg      -08                               2 tab, PO,           l



     oral tablet      14:56:                               Q6Hnow, 0           H

ermann



               00                                 Refill(s)           

 

     Oxycodone            Yes                      5 mg = 1           [facility]

rajeev



     Hydrochlori      5-08                               tab, PO,           l



     de 5 MG      14:56:                               Q4H, PRN           [facility]

n



     Oral Tablet      00                                 Pain Score           



                                                  4-6, 0           



                                                  Refill(s)           

 

     zinc            Yes                      220 mg = 1           [facility]



     sulfate 220      5-08                               cap, PO,           l



     mg oral      14:56:                               Daily, 0           [facility]

n



     capsule      00                                 Refill(s)           

 

     multivitami            Yes                      1 tab, PO,           

[facility]



     n         5-08                               Daily, 0           l



               14:56:                               Refill(s)           [facility]



                                                               

 

     methocarbam            Yes                      1,000 mg =           

[facility]



     ol 500 mg      -08                               2 tab, PO,           l



     oral tablet      14:56:                               Q8H, 0           [facility]

[facility]



                                                Refill(s)           

 

     LORazepam            Yes                      0.5 mg = 1           [facility]

[facility]



     0.5 mg oral      5-08                               tab, PO,           l



     tablet      14:56:                               Q8H, PRN           [facility]



               00                                 Anxiety, 0           



                                                  Refill(s)           

 

     Lidocaine            Yes                      3 patch,           [facility]

[facility]



     Hydrochlori      5-08                               TOP,           l



     de 0.05      14:56:                               Daily,           [facility]



     MG/MG      00                                 Remove           



     Transdermal                                         after 12           



     Patch                                         hours, 0           



     [Lidoderm]                                         Refill(s)           

 

     Robaxin            No                       Notes:           [facility]



               -06                               (Same           l



               21:00:                               as:Robaxin           [facility]



               00                                 )              

 

     Oxycodone            No                       Notes:           [facility]

a



     Hydrochlori                                     (Same as:           l



     de 5 MG      18:06:                               Roxicodone           [facility]

[facility]



     Oral Tablet      00                                 )              

 

     Ativan            No                       Notes:           [facility]



               5-06                               (Same as:           l



               15:18:                               Ativan)           [facility]                                                

 

     Trazodone            No                       Notes:           [facility]

a



     Hydrochlori      5-06                               (Same As:           l



     de 50 MG      02:00:                               Desyrel)           [facility]

nn



     Oral Tablet      00                                                

 

     remove            No                       Notes:           [facility]



     patch      5-06                               Remove           l



               02:00:                               patch 12           [facility]



               00                                 hours           



                                                  after           



                                                  applicatio           



                                                  n each           



                                                  day.           

 

     Oxycodone            No                       Notes:           [facility]

a



     Hydrochlori      5-05                               (Same as:           l



     de 5 MG      22:01:                               Roxicodone           [facility]

[facility]



     Oral Tablet      00                                 )              

 

     Celebrex            No                       Notes:           [facility]



               5-05                               NSAID.           l



               22:00:                               Please           [facility]                                 check           



                                                  indication           



                                                  . Not for           



                                                  seizure.           



                                                  (Same As:           



                                                  CeleBREX)           

 

     Vancomycin            No                        2001 mg:           [facility]

[facility]



               5-05                               infuse           l



               21:00:                               over 2.5           [facility]



               00                                 hours           

 

     Lidocaine            No                       Notes:           [facility]

a



     Hydrochlori      5-05                               Apply only           l



     de 0.05      14:00:                               once for           [facility]

n



     MG/MG      00                                 up to 12           



     Transdermal                                         hours in a           



     Patch                                         24-hour           



     [Lidoderm]                                         period (12           



                                                  hours on           



                                                  and 12           



                                                  hours           



                                                  off).           



                                                  (Same as:           



                                                  Lidoderm)           



                                                  "Remove           



                                                  old patch           



                                                  before           



                                                  applicatio           



                                                  n of new           



                                                  patch"           

 

     Phenergan            No                       Notes: Do           [facility]

[facility]



               5-04                               not give           l



               22:40:                               IV push.                                            (Same as:           



                                                  Phenergan)           

 

     Dilaudid            No                       Notes:           [facility]



               5-04                               Same as           l



               22:40:                               Dilaudid                                                           

 

     Tramadol            No                       Notes: Not           [facility]

[facility]



               5-04                               to exceed           l



               22:00:                               400mg/day.           [facility]



                                                (Same As:           



                                                  Ultram)           

 

     gabapentin            No                       Notes:           [facility]

ia



               5-04                               (Same as:           l



               22:00:                               Neurontin)                                                           

 

     Acetaminoph            No                       Notes: Max           

[facility]



     en        5-04                               acetaminop           l



               22:00:                               hen 4000           [facility]                                 mg/day (4           



                                                  gm/day).           



                                                  (Same as:           



                                                  Tylenol           



                                                  Extra           



                                                  Strength)           

 

     Robaxin            No                       Notes:           [facility]



               5-04                               (Same           l



               22:00:                               as:Robaxin                                            )              

 

     Oxycodone            No                       Notes:           [facility]

a



     Hydrochlori      5-04                               (Same as:           l



     de 5 MG      21:33:                               Roxicodone           [facility]

[facility]



     Oral Tablet      00                                 )              

 

     Beneprotein            No                       Notes:           [facility]

[facility]



     7 gm pkt      5-04                               (Same as:           l



               21:30:                               Beneprotei           [facility]



               00                                 n)             

 

     Acetaminoph            No                       1 tab, PO,           

[facility]



     en 325 MG /      5-04                               TID, 0           l



     Oxycodone      17:12:                               Refill(s)           [facility]

[facility]



     Hydrochlori      00                                                



     de 10 MG                                                        



     Oral Tablet                                                        



     [Percocet                                                        



     10/325]                                                        

 

     Dilaudid            No                       0.5 mg,           [facility]

a



               5-04                               0.25 mL,           l



               16:01:                               Route:                                            IVP, Drug           



                                                  form: INJ,           



                                                  ONCE,           



                                                  Start           



                                                  date:           



                                                  18           



                                                  11:01:00           



                                                  CDT, Stop           



                                                  date:           



                                                  18           



                                                  11:01:00           



                                                  CDT            

 

     Phenergan            No                       Notes:           [facility]

a



               5-04                               (Same as:           l



               15:43:                               Phenergan)                                                           

 

     Dilaudid            No                       2 mg,           [facility]



               5-04                               Route:           l



               15:43:                               IVP, ONCE,                                            Dosing           



                                                  Weight           



                                                  127.027,           



                                                  kg,            



                                                  Priority:           



                                                  STAT,           



                                                  Start           



                                                  date:           



                                                  18           



                                                  10:43:00           



                                                  CDT, Stop           



                                                  date:           



                                                  18           



                                                  10:43:00           



                                                  CDT            

 

     Docusate            No                       Notes:           [facility]



               5-04                               (Same as:           l



               14:00:                               Colace)                                            (Do Not           



                                                  Crush)           

 

     Zinc            No                       Notes:           [facility]



     Sulfate      -04                               (Zinc           l



               14:00:                               sulfate                                            capsule) -           



                                                  220 mg           



                                                  Zinc           



                                                  sulfate =           



                                                  50 mg           



                                                  elemental           



                                                  zinc  Same           



                                                  as Zinc           



                                                  Sulfate           

 

     ascorbic            No                       Notes:           [facility]



     acid      5-04                               (Same as:           l



               14:00:                               Vitamin C)                                                           

 

     multivitami            No                       Notes:           [facility]

[facility]



     n         5-04                               (Same           l



               14:00:                               as:Thera)                                            WASTE: F/P           



                                                  - Black; E           



                                                  -              



                                                  Municipal           



                                                  Trash Bin           



                                                  Take with           



                                                  food.           

 

     Naproxen            No                       Notes:           [facility]



               5-04                               (Same as:           l



               07:00:                               Naprosyn)                                            Take with           



                                                  food.           

 

     Zosyn            No                       Notes:           [facility]



               5-04                               (Same as:           l



               07:00:                               Zosyn)                                            Dosing           



                                                  based on           



                                                  Piperacill           



                                                  in             



                                                  component           



                                                   ***           



                                                  MEDICATION           



                                                  WASTE ***           



                                                  Product           



                                                  Size:           



                                                  3375 mg           



                                                  Product           



                                                  Wasted:           



                                                  ___ mg           

 

     Vancomycin            No                         mg:           [facility]

[facility]



               5-                               infuse           l



               07:00:                               over 2.5           [facility]



               00                                 hours  For           



                                                  adult           



                                                  patients           



                                                  only:           



                                                  Round to           



                                                  nearest           



                                                  250 mg per           



                                                  Medical           



                                                  Staff           



                                                  approval           



                                                  ***            



                                                  MEDICATION           



                                                  WASTE ***           



                                                  Product           



                                                  Size:           



                                                  1000 mg           



                                                  Product           



                                                  Wasted:           



                                                  ___ mg           

 

     Enoxaparin            No                       Notes:           [facility]

[facility]



               -                               (Same as:           l



               07:00:                               Lovenox)                                                           

 

     Sodium            No                       1,000 mL,           [facility]

a



     Chloride                                     Rate: 125           l



     0.9% IV      06:46:                               ml/hr,           [facility]



     1,000 mL      00                                 Infuse           



                                                  over: 8           



                                                  hr, Route:           



                                                  IV, Dosing           



                                                  Weight           



                                                  127.27 kg,           



                                                  Total           



                                                  Volume:           



                                                  1,000,           



                                                  Start           



                                                  date:           



                                                  18           



                                                  1:46:00           



                                                  CDT,           



                                                  Duration:           



                                                  30 day,           



                                                  Stop date:           



                                                  18           



                                                  1:45:00           



                                                  CDT, 2.44,           



                                                  m2             

 

     Saline            No                       Notes:           [facility]



     Flush 0.9%                                     (Same as:           l



               06:46:                               BD                                              Posiflush)           

 

     Acetaminoph            No                       Notes: Do           [facility]

[facility]



     en        -                               not exceed           l



               06:46:                               4 gm/day.                                            (Same as:           



                                                  Tylenol)           

 

     Acetaminoph            No                       Notes:           [facility]

[facility]



     en 325 MG /      -                               (Same as:           l



     Hydrocodone      06:46:                               Norco           [facility]

nn



     Bitartrate      00                                 325/5)  Do           



     5 MG Oral                                         not exceed           



     Tablet                                         4gm/day of           



                                                  acetaminop           



                                                  hen.           

 

     Reglan            No                       Notes:           [facility]



               5-04                               (Same as:           l



               04:27:                               Reglan)                                                           

 

     Benadryl            No                       Notes:           [facility]



               5-04                               (Same as:           l



               04:27:                               Benadryl)                                                           

 

     Magnesium            No                       Notes:           [facility]

a



     Sulfate                                     WASTE: F/P           l



               04:26:                               - Sink; E           [facility]



                                                -              



                                                  Municipal           



                                                  Trash Bin           

 

     Sodium            No                       1,000 mL,           [facility]

a



     Chloride      5-04                               1000           l



     0.9%      04:26:                               ml/hr,           [facility]



     (Bolus) IV      00                                 Infuse           



                                                  Over: 1           



                                                  hr, Route:           



                                                  IV, 1,000,           



                                                  Drug form:           



                                                  INJ, ONCE,           



                                                  Priority:           



                                                  STAT,           



                                                  Dosing           



                                                  Weight           



                                                  127.273           



                                                  kg, Start           



                                                  date:           



                                                  18           



                                                  23:26:00           



                                                  CDT, Stop           



                                                  date:           



                                                  18           



                                                  23:26:00           



                                                  CDT            

 

     Zosyn            No                       4.5 gm,           [facility]



                                              Route:           l



               04:10:                               IVPB,           [facility]



               00                                 ONCE,           



                                                  Dosing           



                                                  Weight           



                                                  127.273,           



                                                  kg,            



                                                  Priority:           



                                                  STAT,           



                                                  Start           



                                                  date:           



                                                  18           



                                                  23:10:00           



                                                  CDT, Stop           



                                                  date:           



                                                  18           



                                                  23:10:00           



                                                  CDT, ABX           



                                                  Indication           



                                                  :              



                                                  Bacteremia           

 

     Vancomycin            No                         mg:           Me

moria



                                              infuse           l



               04:10:                               over 2.5           Jose



               00                                 hours  For           



                                                  adult           



                                                  patients           



                                                  only:           



                                                  Round to           



                                                  nearest           



                                                  250 mg per           



                                                  Medical           



                                                  Staff           



                                                  approval           



                                                  ***            



                                                  MEDICATION           



                                                  WASTE ***           



                                                  Product           



                                                  Size:           



                                                  1000 mg           



                                                  Product           



                                                  Wasted:           



                                                  ___ mg           

 

     normal            No                       1,000 mL,           [facility]

a



     saline 0.9%                                     Rate: 75           l



     IV 1,000 mL      03:39:                               ml/hr,           Herm

nguyen



                                                Infuse           



                                                  over: 13.3           



                                                  hr, Route:           



                                                  IV, Dosing           



                                                  Weight           



                                                  127.273           



                                                  kg, Total           



                                                  Volume:           



                                                  1,000,           



                                                  Start           



                                                  date:           



                                                  18           



                                                  22:39:00           



                                                  CDT,           



                                                  Duration:           



                                                  30 day,           



                                                  Stop date:           



                                                  18           



                                                  22:38:00           



                                                  CDT, 2.36,           



                                                  m2             

 

     Acetaminoph            No                       Notes: Max           

[facility]



     en                                       acetaminop           l



               02:56:                               hen 4000           Jose



               00                                 mg/day (4           



                                                  gm/day).           



                                                  (Same as:           



                                                  Tylenol           



                                                  Extra           



                                                  Strength)           

 

     Rocephin            No                       Notes:           [facility]



                                              (Same As:           l



               02:21:                               Rocephin).           Jose



               00                                   ***           



                                                  MEDICATION           



                                                  WASTE ***           



                                                  Product           



                                                  Size:           



                                                  1000 mg           



                                                  Product           



                                                  Wasted:           



                                                  _0__ mg           

 

     Morphine            No                       4 mg,           [facility]



                                              Route:           l



               00:05:                               IVP, ONCE,           Jose



                                                Dosing           



                                                  Weight           



                                                  127.273,           



                                                  kg,            



                                                  Priority:           



                                                  STAT,           



                                                  Start           



                                                  date:           



                                                  18           



                                                  19:05:00           



                                                  CDT, Stop           



                                                  date:           



                                                  18           



                                                  19:05:00           



                                                  CDT            

 

     Benadryl            No                       Notes:           [facility]



               5-03                               (Same as:           l



               23:14:                               Benadryl)                                                           

 

     Benadryl            No                       Notes:           [facility]



               5-03                               (Same as:           l



               22:56:                               Benadryl)                                                           

 

     Morphine            No                       4 mg, 1           [facility]

a



               5-03                               mL, Route:           l



               22:56:                               IVP, Drug                                            form:           



                                                  SOLN,           



                                                  ONCE,           



                                                  Dosing           



                                                  Weight           



                                                  127.273,           



                                                  kg,            



                                                  Priority:           



                                                  STAT,           



                                                  Start           



                                                  date:           



                                                  18           



                                                  17:56:00           



                                                  CDT, Stop           



                                                  date:           



                                                  18           



                                                  17:56:00           



                                                  CDT            

 

     Tylenol Tylenol           Yes  [place]                1 tablet           [facility]

[facility]



     with with                                    as needed           [facility]



     Codeine #4 Codeine #4                                                   [facility]

oria



                                                                 l



                                                                 [facility]



                                                                 ent



                                                                 Clinics

 

     Macrobid Macrobid           Yes  [place]                1 capsule          

 [facility]



                                                  with food           [facility]



                                                                 [facility]



                                                                 l



                                                                 [facility]



                                                                 ent



                                                                 Clinics

 

     Pantoprazol Pantoprazol           Yes  [place]                1 tablet     

      [facility]



     e Sodium e Sodium                                                   [facility]



                                                                 [facility]



                                                                 l



                                                                 [facility]



                                                                 ent



                                                                 Clinics

 

     Collagenase Collagenase           Yes  [place]                1            

  [facility]



                                                  applicatio           [facility]



                                                  n to           [facility]



                                                  affected           l



                                                  area           [facility]



                                                                 ent



                                                                 Clinics







Vital Signs







             Vital Name   Observation Time Observation Value Comments     Source

 

             Systolic blood 2020 12:58:00 136 mm[Hg]                [facility]

 

             Diastolic blood 2020 12:58:00 78 mm[Hg]                 [facility]

[facility]

 

             Heart rate   2020 12:58:00 96 /min                   [facility]

 

             Body temperature 2020 12:58:00 35.78 Tiffanie                 [facility]

 

             Respiratory rate 2020 12:58:00 18 /min                   [facility]

 

             Oxygen saturation in 2020 12:58:00 96 /min                   

[facility]



             Arterial blood by                                        Medical Ce

nter



             Pulse oximetry                                        

 

             Respitory Rate 2018 17:30:00                           [facility]

al [facility]

 

             Systolic (mm Hg) 2018 17:30:00                           [facility]

rial [facility]

 

             Diastolic (mm Hg) 2018 17:30:00                           [facility]

orial [facility]

 

             Temperature Oral (F) 2018 17:30:00 98.4 F                    

[facility]

 

             Temperature Oral (F) 2018 16:23:00 98.6 F                    

[facility]

 

             Systolic (mm Hg) 2018 16:23:00                           [facility]

rial [place]

 

             Diastolic (mm Hg) 2018 16:23:00                           [facility]

orial [facility]

 

             Respitory Rate 2018 14:00:00                           [facility]

al [place]

 

             Systolic (mm Hg) 2018 14:00:00                           [facility]

rial [facility]

 

             Diastolic (mm Hg) 2018 14:00:00                           Mem

orial [facility]

 

             Respitory Rate 2018 13:30:00                           [facility]

al [place]

 

             BMI Calculated 2018 10:16:00                           [facility]

al [place]

 

             Height       2018 10:16:00 149.86 cm                 [facility]

 [facility]

 

             Weight       2018 10:16:00                           [facility]

 [facility]

 

             Heart Rate   2018 10:16:00                           [facility]

 [facility]

 

             Temperature Oral (F) 2018 10:16:00 97.9 F                    

[facility]

 

             Temperature Oral (F) 2018 13:40:00 97.2 F                    

[facility]

 

             Systolic (mm Hg) 2018 13:40:00                           [facility]

rial [facility]

 

             Diastolic (mm Hg) 2018 13:40:00                           Mem

[facility] [place]

 

             Heart Rate   2018 13:40:00                           [facility]

 [facility]

 

             Respitory Rate 2018 13:40:00                           [facility]

al [facility]

 

             Heart Rate   2018 05:24:00                           [facility]

 [facility]

 

             Systolic (mm Hg) 2018 05:24:00                           [facility]

rial [facility]

 

             Diastolic (mm Hg) 2018 05:24:00                           Mem

orial [place]

 

             Respitory Rate 2018 05:24:00                           [facility]

al [facility]

 

             Temperature Oral (F) 2018 05:24:00 98.1 F                    

[facility] [place]

 

             Respitory Rate 2018 01:15:00                           [facility]

al [place]

 

             Systolic (mm Hg) 2018 01:15:00                           [facility]

rial [facility]

 

             Diastolic (mm Hg) 2018 01:15:00                           Mem

orial [facility]

 

             Heart Rate   2018 01:15:00                           [facility]

 [place]

 

             Temperature Oral (F) 2018 01:15:00 98.1 F                    

[facility]

 

             Weight       2018 14:00:00                           [facility]

 [facility]

 

             Weight       2018 14:00:00                           [facility]

 [place]

 

             Weight       2018 14:00:00                           [facility]

 [place]

 

             BMI Calculated 2018 05:43:00                           [facility]

al [facility]

 

             Height       2018 05:43:00 162.56 cm                 [facility]

 

             Height       2018 22:41:00 149.86 cm                 [facility]

 Jose

 

             BMI Calculated 2018 22:41:00                           [facility]







Procedures







                Procedure       Date / Time     Performing Clinician Source



                                Performed                       

 

                RHYTHM STRIP - SCAN 2020 14:11:01 Provider, [facility]

 

                RHYTHM STRIP - SCAN 2020 15:32:49 Provider, [facility]

 

                [facility]

 

                [facility]

 

                [facility]15 [facility]

 

                [facility]SE MET[facility] 2020-01-15 16:35:[facility]

 

                PO[facility]-GLUCOSE METER 2020-01-15 12:08:[facility]

 

                POCT-GLUCOSE METER 2020-01-15 08:35:[facility]

 

                Cholecystectomy                                 [facility]

 

                Shunt of cerebral                                 [facility]

nn



                ventricle to extracranial                                 



                site                                            







Plan of Care







             Planned Activity Planned Date Details      Comments     Source

 

             Future Scheduled 2023-01-10   Lipid panel               [facility]

s -



             Test         00:00:00     (procedure) [code =              Medical 

Center



                                       19367007]                 

 

             Future Scheduled 2020   INFLUENZA VACCINE (#1)              C

[facility]



             Test         00:00:00     [code = INFLUENZA              Medical Ce

nter



                                       VACCINE (#1)]              

 

             Future Scheduled 2020-04-10   Hemoglobin A1c              [facility]

[facility] -



             Test         00:00:00     measurement               [facility] Center



                                       (procedure) [code =              



                                       15502595]                 

 

             Future Scheduled 1995   DIABETIC EYE EXAM              [facility]



             Test         00:00:00     [code = DIABETIC EYE              Medical

 Center



                                       EXAM]                     

 

             Future Scheduled 1995   Diabetic foot              [facility]

es -



             Test         00:00:00     examination               Medical Center



                                       (regime/therapy) [code              



                                       = 275457936]              

 

             Future Scheduled 1995   Urine screening for              [facility]



             Test         00:00:00     protein (procedure)              Medical 

Center



                                       [code = 122449222]              

 

             Future Scheduled 1991   PNEUMOCOCCAL VACCINE              [facility]



             Test         00:00:00     0-64 YRS (1 of 1 -              Medical C

enter



                                       PPSV23) [code =              



                                       PNEUMOCOCCAL VACCINE              



                                       0-64 YRS (1 of 1 -              



                                       PPSV23)]                  







Encounters







        Start   End     Encounter Admission Attending Care    Care    Encounter 

Source



        Date/Time Date/Time Type    Type    Clinicians Facility Department ID   

   

 

        2019 Outpatient                 [facility] M[facility] [id number]

[id number] 



        11:11:26 23:59:59                                         08      

 

        2019 Outpatient                 [facility] M[facility]IS[facility]ER [id number]

[id number] 



        11:16:47 23:59:59                                         07      

 

        2019-07-15 2019 Outpatient                 M[facility] M[facility] [id number]

[id number] 



        10:52:03 23:59:59                                         06      

 

        2019 Outpatient                 M[facility]IS[facility]ER M[facility]IS[facility]ER 551

[id number] 



        13:55:03 23:59:59                                         05      

 

        2018 Outpatient         [facility]   [id number]

[id number] 



        04:12:00 12:24:00                 Dar Dial      

 

        2018 Outpatient                 Hollie Romero 14

[id number] [facility]



        11:15:00 11:15:00                         t [employer]   [facility]



                                                                        [facility]

 

        2018 Outpatient                 Hollie Romero 14

[id number] [facility]



        11:30:00 11:30:00                         t [employer]   [facility]                 Outpati



                                                                        ent



                                                                        [facility]

 

        2018 Outpatient                 Hollie Romero 14

[id number] [facility]



        15:41:00 15:41:00                         [facility]

s [facility]



                                                                        ent



                                                                        [facility]

 

        2018 Outpatient                 [facility]

[id number] [facility]



        15:42:00 15:42:00                         [facility]

s [facility]



                                                                        ent



                                                                        [facility]

 

        2018 Outpatient                 [facility] [address]

[id number] [facility]



        11:00:00 11:00:00                         [facility]



                                                                        ent



                                                                        [facility]

 

        2018 Outpatient         Deedee Reinoso [facility]   [id number]

[id number] 



        17:40:00 12:28:00                 Jamie                   23      







Results







           Test Description Test Time  Test Comments Results    Result Comments 

Source









                    POC-Glucose meter   2020 13:03:00 









                      Test Item  Value      Reference Range Interpretation Comme

nts









             POC-Glucose Meter (test code = 140 mg/dL           H         

   : TESTED AT [facility] [address], [facility]

30:



                                                                 /Techni

christina ID = [id number] for



                                                                 JUAN RAMON YULISANAT

E

 

             Lab Interpretation (test code = Abnormal                           

    



             62553-8)                                            



[id number] 13:03:00





             Test Item    Value        Reference Range Interpretation Comments

 

             POC-GLUCOSE METER 140 mg/dL           H            : TESTED A

T [facility] [address]



             (BEAKER) (test code =                                        [facility],



             [facility]8)                                               [id number]:



                                                                 /Techni

christina ID



                                                                 = [id number] for ANANT CATES



POCT-GLUCOSE [id number] 09:15:00





             Test Item    Value        Reference Range Interpretation Comments

 

             POC-GLUCOSE METER 142 mg/dL           H            : TESTED A

T [facility] [address]



             (BEAKER) (test code =                                        [facility],



             1538)                                               [id number]:



                                                                 /Techni

christina ID



                                                                 = [id number] for ANANT CATES



POCT-GLUCOSE METER2020-01-15 20:56:00





             Test Item    Value        Reference Range Interpretation Comments

 

             POC-GLUCOSE METER 134 mg/dL           H            : TESTED A

T [facility] [address]



             (BEAKER) (test code =                                        [place]

[place],



             1538)                                               [id number]:



                                                                 /Techni

christina ID



                                                                 = [id number] for SHERRILL GUARDADO



POCT-GLUCOSE METER2020-01-15 16:46:00





             Test Item    Value        Reference Range Interpretation Comments

 

             POC-GLUCOSE METER 91 mg/dL                         : TESTED A

T [facility]



             (BEAKER) (test code =                                        [facility],



             153)                                               [address]:



                                                                 /Techni

christina ID =



                                                                 [id number] for ANANT HAMILTON



POCT-GLUCOSE METER2020-01-15 12:19:00





             Test Item    Value        Reference Range Interpretation Comments

 

             POC-GLUCOSE METER 237 mg/dL           H            : TESTED A

T [facility]



             (BEAKER) (test code =                                        [facility]



             153)                                               [address]:



                                                                 /Techni

christina ID



                                                                 = [id number] for ANANT CATES



MR, EXTREMITY, LOWER, WITHOUT CONTRAST, RIGHT2020-01-15 09:12:00FINAL REPORT 
PATIENT ID:   [id number] MRI of the right and left hindfoot without intravenous 
contrast History: Osteomyelitis, diabetic foot Comparison: Radiograph dated 
2020 Technique: Multiplanar multisequence MRI of the right and left 
hindfoot was performed without intravenous contrast using the hindfoot 
osteomyelitis protocol Findings: Right foot: Marked T1 and T2 hypointense soft 
tissue thickening is noted at the medial aspect of the right heel, likely to 
reflect scar tissue. There is also mild subcutaneous edema at the lateral aspect
of the mid foot and forefoot, incompletely imaged. No discrete drainable fluid 
collection is identified. There is no marrow signal abnormality to suggest 
osteomyelitis. There is a small nonspecific joint effusion at the ankle and 
subtalar joint. Scattered degenerative changes are noted. There is marked fatty 
atrophy of the distal leg and foot musculature. Severe flexor hallucis longus 
tendinopathy. No tenosynovitis. Left foot: There is a large area ofsoft tissue 
defect and granulation tissue at the posteromedial aspect of the heel, reaching 
the calcaneus, but there is no drainable fluid collection. Deformity is noted in
the hindfoot, and the forefoot appears adducted. No acute fracture. No marrow 
signal abnormality is identified to indicate acute osteomyelitis. There is no 
significant joint effusion. There is severe atrophy of the foot and distalleg 
musculature. No significant tenosynovitis identified. IMPRESSION: 
Granulation/scar tissue at themedial aspect of the heel bilaterally. No MR 
evidence of osteomyelitis. Severe muscle atrophy. Signed: Nguyen Cornejo 
Verified Date/Time:  01/15/2020 09:12:01 Reading Location: [facility] 
Consult Reading Room        Electronically signed by: NGUYEN CORNEJO M.D. on 
01/15/2020 09:12 AMMR, EXTREMITY, LOWER, WITHOUT CONTRAST, LEFT2020-01-15 
09:12:00FINAL REPORT PATIENT ID:   [id number] MRI of the right and left hindfoot 
without intravenous contrast History: Osteomyelitis, diabetic foot Comparison: 
Radiograph dated 2020 Technique: Multiplanar multisequence MRI of the
right and left hindfoot was performed without intravenous contrast using the 
hindfoot osteomyelitis protocol Findings: Right foot: Marked T1 and T2 
hypointense soft tissue thickening is noted at the medial aspect of the right 
heel, likely to reflect scar tissue. There is also mild subcutaneous edema at 
the lateral aspect of the mid foot and forefoot, incompletely imaged. No 
discrete drainable fluid collection is identified. There is no marrow signal 
abnormality to suggest osteomyelitis. There is a small nonspecific joint 
effusion at the ankle and subtalar joint. Scattered degenerative changes are 
noted. There is marked fatty atrophy of the distal leg and foot musculature. 
Severe flexor hallucis longus tendinopathy. No tenosynovitis. Left foot: There 
is a large area ofsoft tissue defect and granulation tissue at the posteromedial
aspect of the heel, reaching the calcaneus, but there is no drainable fluid 
collection. Deformity is noted in the hindfoot, and the forefoot appears 
adducted. No acute fracture. No marrow signal abnormality is identified to 
indicate acute osteomyelitis. There is no significant joint effusion. There is 
severe atrophy of the foot and distalleg musculature. No significant 
tenosynovitis identified. IMPRESSION: Granulation/scar tissue at themedial 
aspect of the heel bilaterally. No MR evidence of osteomyelitis. Severe muscle 
atrophy. Signed: Nguyen Cornejo Verified Date/Time:  01/15/2020 09:12:01 
Reading Location: [facility] Consult Reading Room        Electronically 
signed by: NGUYEN CORNEJO M.D. on 01/15/2020 09:12 AMPOCT-GLUCOSE METER2020-01-15 
08:47:00





             Test Item    Value        Reference Range Interpretation Comments

 

             POC-GLUCOSE METER 264 mg/dL           H            : TESTED A

T [facility]



             ([facility]) (test code =                                        [facility],



             153)                                               [address]:



                                                                 /Techni

christina ID



                                                                 = [id number] for ANANT CATES



ISLET CELL AB SCR2020-01-15 07:43:00





             Test Item    Value        Reference Range Interpretation Comments

 

             ISLET CELL AB Refer to individual                           



             AUTOVERIFICATION (test Islet Cell Ab                           



             code = 2556) and/or Islet Cell                           



                          Ab Titer results.                           



POCT-GLUCOSE [id number] 21:27:00





             Test Item    Value        Reference Range Interpretation Comments

 

             POC-GLUCOSE METER 130 mg/dL           H            : TESTED A

[facility]



             ([facility]) (test code =                                        [facility],



             153)                                               [address]:



                                                                 /Techni

christina ID



                                                                 = [id number] for KRANTHI PIERRE



BLOOD [id number] 13:00:00





             Test Item    Value        Reference Range Interpretation Comments

 

             CULTURE (BEAKER) (test No growth in 5 days                         

  



             code = 1095)                                        



BLOOD [id number] 13:00:00





             Test Item    Value        Reference Range Interpretation Comments

 

             CULTURE (BEAKER) (test No growth in 5 days                         

  



             code = 1095)                                        



POCT-GLUCOSE [id number] 12:57:00





             Test Item    Value        Reference Range Interpretation Comments

 

             POC-GLUCOSE METER 138 mg/dL           H            : TESTED A

[facility]



             ([facility]) (test code =                                        [facility],



             153)                                               [address]:



                                                                 /Techni

christina ID



                                                                 = [id number] for [place]

LLIS,



                                                                 BARBARA



POCT-GLUCOSE [id number] 08:43:00





             Test Item    Value        Reference Range Interpretation Comments

 

             POC-GLUCOSE METER 226 mg/dL           H            : TESTED A

[facility]



             ([facility]) (test code =                                        [facility],



             153)                                               [address]:



                                                                 /Techni

christina ID



                                                                 = [id number] for [place]

LLIS,



                                                                 BARBARA



POCT-GLUCOSE [id number] 21:11:00





             Test Item    Value        Reference Range Interpretation Comments

 

             POC-GLUCOSE METER 254 mg/dL           H            : Notified

 RN/MD:



             ([facility]) (test code =                                        TESTED

 AT [place] [id number]



             153)                                               [facility],



                                                                 [address]:



                                                                 /Techni

christina ID



                                                                 = [id number] for KRANTHI PIERRE



POCT-GLUCOSE [id number] 21:02:00





             Test Item    Value        Reference Range Interpretation Comments

 

             POC-GLUCOSE METER 177 mg/dL           H            : TESTED A

T [facility] [address]20



             (BEAKER) (test code =                                        [facility],



             1538)                                               [address]:



                                                                 /Techni

christina ID



                                                                 = [id number] for [place]

LLIS,



                                                                 BARBARA



POCT-GLUCOSE [id number] 12:31:00





             Test Item    Value        Reference Range Interpretation Comments

 

             POC-GLUCOSE METER 215 mg/dL           H            : TESTED A

T [facility] [address]20



             (BEAKER) (test code =                                        [place]

[facility],



             1538)                                               [address]:



                                                                 /Techni

christina ID



                                                                 = [id number] for [place]

LLIS,



                                                                 BARBARA



WOUND CULTURE + GRAM [id number] 10:10:00





             Test Item    Value        Reference    Interpretation Comments



                                       Range                     

 

             CULTURE (BEAKER) PROTEUS MIRABILIS              A            1+ Pro

teus



             (test code = 1095)                                        mirabilis

 

             Amikacin (test code =                           S            



             1)                                                  

 

             Ampicillin +                           S            



             Sulbactam (test code                                        



             = 6)                                                

 

             Aztreonam (test code                           S            



             = 32)                                               

 

             Cefepime (test code =                           S            



             51)                                                 

 

             Cefoxitin (test code                           R            



             = 68)                                               

 

             Ceftazidime (test                           S            



             code = 27)                                          

 

             Ceftriaxone (test                           S            



             code = 52)                                          

 

             Ertapenem (test code                           S            



             = 38)                                               

 

             Gentamicin (test code                           S            



             = 18)                                               

 

             Levofloxacin (test                           R            



             code = 22)                                          

 

             Meropenem (test code                           S            



             = 34)                                               

 

             Piperacillin +                           S            



             Tazobactam (test code                                        



             = 29)                                               

 

             Tetracycline (test                           R            



             code = 2)                                           

 

             Tobramycin (test code                           S            



             = 25)                                               

 

             Trimethoprim +                           R            



             Sulfamethoxazole                                        



             (test code = 47)                                        

 

             CULTURE (BEAKER) METHICILLIN               A            1+ Methicil

bryn



             (test code = 1095) RESISTANT                              resistant



                          STAPHYLOCOCCUS                           Staphylococcu

s



                          AUREUS                                 aureus

 

             Clindamycin (test                           R            



             code = 10)                                          

 

             Erythromycin (test                           R            



             code = 4)                                           

 

             Linezolid (test code                           S            



             = 40)                                               

 

             Nitrofurantoin (test                           S            



             code = 23)                                          

 

             Oxacillin (test code                           R            



             = 14)                                               

 

             Rifampin (test code =                           S            



             43)                                                 

 

             Tetracycline (test                           S            



             code = 2)                                           

 

             Trimethoprim +                           S            



             Sulfamethoxazole                                        



             (test code = 47)                                        

 

             Vancomycin (test code                           S            



             = 13)                                               

 

             CULTURE (BEAKER) ESCHERICHIA COLI              A            <1+ Esc

herichia



             (test code = 1095)                                        coliESBL 

Positive

 

             Amikacin (test code =                           S            



             1)                                                  

 

             Ampicillin +                           R            



             Sulbactam (test code                                        



             = 6)                                                

 

             Aztreonam (test code                           R            



             = 32)                                               

 

             Cefepime (test code =                           R            



             51)                                                 

 

             Cefoxitin (test code                           R            



             = 68)                                               

 

             Ceftazidime (test                           R            



             code = 27)                                          

 

             Ceftriaxone (test                           R            



             code = 52)                                          

 

             Ertapenem (test code                           S            



             = 38)                                               

 

             Gentamicin (test code                           S            



             = 18)                                               

 

             Levofloxacin (test                           R            



             code = 22)                                          

 

             Meropenem (test code                           S            



             = 34)                                               

 

             Nitrofurantoin (test                           S            



             code = 23)                                          

 

             Piperacillin +                           R            



             Tazobactam (test code                                        



             = 29)                                               

 

             Tetracycline (test                           S            



             code = 2)                                           

 

             Tobramycin (test code                           S            



             = 25)                                               

 

             Trimethoprim +                           R            



             Sulfamethoxazole                                        



             (test code = 47)                                        

 

             CULTURE (BEAKER)                           A            Beta-hemoly

tic



             (test code = 1095)                                        streptoco

ccus



                                                                 group B, by



                                                                 serological



                                                                 grouping

 

             GRAM STAIN RESULT 3+ WBCs                                



             (BEAKER) (test code =                                        



             1123)                                               

 

             GRAM STAIN RESULT 1+ gram negative                           



             (BEAKER) (test code = rods                                   



             278626)                                             

 

             GRAM STAIN RESULT 2+ gram positive                           



             (BEAKER) (test code = rods                                   



             934607)                                             

 

             GRAM STAIN RESULT <1+ gram negative                           



             (BEAKER) (test code = coccobacilli                           



             675930)                                             

 

             GRAM STAIN RESULT 2+ gram positive                           



             (BEAKER) (test code = cocci in pairs                           



             083577)                                             



POCT-GLUCOSE [id number] 08:52:00





             Test Item    Value        Reference Range Interpretation Comments

 

             POC-GLUCOSE METER 209 mg/dL           H            : TESTED A

T [facility] [address]20



             (BEAKER) (test code =                                        [facility],



             153)                                               [address]:



                                                                 /Techni

christina ID



                                                                 = [id number] for [place]

LLIS,



                                                                 BARBARA



POCT-GLUCOSE [id number] 21:26:00





             Test Item    Value        Reference Range Interpretation Comments

 

             POC-GLUCOSE METER 255 mg/dL           H            : TESTED A

T [facility] [address]20



             (BEAKER) (test code =                                        [facility],



             153)                                               [address]:



                                                                 /Techni

christina ID



                                                                 = [id number] for CR

SHERRILL GARCIA



POCT-GLUCOSE [id number] 17:34:00





             Test Item    Value        Reference Range Interpretation Comments

 

             POC-GLUCOSE METER 227 mg/dL           H            : TESTED A

T [facility] [address]20



             (BEAKER) (test code =                                        [facility],



             153)                                               [address]:



                                                                 /Techni

christina ID



                                                                 = [id number] for WASSERMAN

DALTON,



                                                                 NAKITA



POCT-GLUCOSE [id number] 11:28:00





             Test Item    Value        Reference Range Interpretation Comments

 

             POC-GLUCOSE METER 282 mg/dL           H            : TESTED A

T [facility] [address]20



             (BEAKER) (test code =                                        [facility],



             1538)                                               [address]:



                                                                 /Techni

christina ID



                                                                 = [id number] for WASSERMAN

DALTON,



                                                                 NAKITA



POCT-GLUCOSE [id number] 08:19:00





             Test Item    Value        Reference Range Interpretation Comments

 

             POC-GLUCOSE METER 329 mg/dL           H            : TESTED A

T [facility] 6720



             (BEAKER) (test code =                                        [facility],



             [facility])                                               [address]:



                                                                 /Techni

christina ID



                                                                 = [id number] for ANANT CATES



POCT-GLUCOSE [id number] 21:32:00





             Test Item    Value        Reference Range Interpretation Comments

 

             POC-GLUCOSE METER 275 mg/dL           H            : TESTED A

T [facility] 6720



             (BEAKER) (test code =                                        [facility],



             1538)                                               [address]:



                                                                 /Techni

christina ID



                                                                 = [id number] for SHERRILL GUARDADO



POCT-GLUCOSE [id number] 17:47:00





             Test Item    Value        Reference Range Interpretation Comments

 

             POC-GLUCOSE METER 304 mg/dL           H            : TESTED A

T [facility] 6720



             (BEAKER) (test code =                                        [facility],



             153)                                               [address]:



                                                                 /Techni

christina ID



                                                                 = [id number] for [place]

SAVAGE,



                                                                 LUIZA



URINE [id number] 15:21:00





             Test Item    Value        Reference Range Interpretation Comments

 

             [facility]                           A            >100,000 co

l/mL



             (test code = 1095)                                        Beta-hemo

lytic



                                                                 streptococcus g

roup



                                                                 B, by serologic

al



                                                                 grouping

 

             CULTURE ([facility]) PROTEUS                   A            80-89,000 c

ol/mL



             (test code = 1095) MIRABILIS                              Proteus



                                                                 mirabilisESBL



                                                                 Positive

 

             Amikacin (test code =                           S            



             1)                                                  

 

             Ampicillin +                           R            



             Sulbactam (test code                                        



             = 6)                                                

 

             Aztreonam (test code                           R            



             = 32)                                               

 

             Cefepime (test code =                           R            



             51)                                                 

 

             Cefoxitin (test code                           R            



             = 68)                                               

 

             Ceftazidime (test                           R            



             code = 27)                                          

 

             Ceftriaxone (test                           R            



             code = 52)                                          

 

             Ertapenem (test code                           S            



             = 38)                                               

 

             Gentamicin (test code                           R            



             = 18)                                               

 

             Levofloxacin (test                           R            



             code = 22)                                          

 

             Meropenem (test code                           S            



             = 34)                                               

 

             Nitrofurantoin (test                           R            



             code = 23)                                          

 

             Tetracycline (test                           R            



             code = 2)                                           

 

             Tobramycin (test code                           R            



             = 25)                                               



POCT-GLUCOSE [id number] 12:16:00





             Test Item    Value        Reference Range Interpretation Comments

 

             POC-GLUCOSE METER 337 mg/dL           H            : TESTED A

T [facility] [address]



             (BEEnvironmentIQ) (test code =                                        [facility]

[facility],



             1538)                                               [address]:



                                                                 /Techni

christina ID



                                                                 = [id number] for LUIZA FIGUEROA



BASIC METABOLIC [id number] 10:39:00





             Test Item    Value        Reference Range Interpretation Comments

 

             SODIUM (BEAKER) 134 meq/L    136-145      L            



             (test code = 381)                                        

 

             POTASSIUM (BEAKER) 4.6 meq/L    3.5-5.1                   



             (test code = 379)                                        

 

             CHLORIDE (BEAKER) 105 meq/L                        



             (test code = 382)                                        

 

             CO2 (BEAKER) (test 20 meq/L     22-29        L            



             code = 355)                                         

 

             BLOOD UREA NITROGEN 14 mg/dL     7-21                      



             (BEAKER) (test code                                        



             = 354)                                              

 

             CREATININE (BEAKER) 0.97 mg/dL   0.57-1.25                 



             (test code = 358)                                        

 

             GLUCOSE RANDOM 429 mg/dL           HH           



             (BEAKER) (test code                                        



             = 652)                                              

 

             CALCIUM (BEAKER) 8.9 mg/dL    8.4-10.2                  



             (test code = 697)                                        

 

             EGFR (BEAKER) (test 107 mL/min/1.73                           ESTIM

ATED GFR IS



             code = 1092) sq m                                   NOT AS ACCURATE

 AS



                                                                 CREATININE



                                                                 CLEARANCE IN



                                                                 PREDICTING



                                                                 GLOMERULAR



                                                                 FILTRATION RATE

.



                                                                 ESTIMATED GFR I

S



                                                                 NOT APPLICABLE 

FOR



                                                                 DIALYSIS PATIEN

TS.



 ID - MAGAN FPOCT-GLUCOSE [id number] 08:29:00





             Test Item    Value        Reference Range Interpretation Comments

 

             POC-GLUCOSE METER 395 mg/dL           H            : TESTED A

T [facility] [address]



             (BEAKER) (test code =                                        [facility]

[facility],



             1538)                                               [id number]:



                                                                 /Techni

christina ID



                                                                 = [id number] for LUIZA FIGUEROA



CBC W/PLT COUNT &amp; AUTO [id number] 06:40:00





             Test Item    Value        Reference Range Interpretation Comments

 

             WHITE BLOOD CELL COUNT (BEAKER) 7.2 K/ L     3.5-10.5              

    



             (test code = 775)                                        

 

             RED BLOOD CELL COUNT (BEAKER) 5.48 M/ L    4.63-6.08               

  



             (test code = 761)                                        

 

             HEMOGLOBIN (BEAKER) (test code = 15.1 GM/DL   13.7-17.5            

     



             410)                                                

 

             HEMATOCRIT (BEAKER) (test code = 46.4 %       40.1-51.0            

     



             411)                                                

 

             MEAN CORPUSCULAR VOLUME (BEAKER) 84.7 fL      79.0-92.2            

     



             (test code = 753)                                        

 

             MEAN CORPUSCULAR HEMOGLOBIN 27.6 pg      25.7-32.2                 



             (BEAKER) (test code = 751)                                        

 

             MEAN CORPUSCULAR HEMOGLOBIN CONC 32.5 GM/DL   32.3-36.5            

     



             (BEAKER) (test code = 752)                                        

 

             RED CELL DISTRIBUTION WIDTH 15.5 %       11.6-14.4    H            



             (BEAKER) (test code = 412)                                        

 

             PLATELET COUNT (BEAKER) (test 315 K/CU MM  150-450                 

  



             code = 756)                                         

 

             MEAN PLATELET VOLUME (BEAKER) 10.5 fL      9.4-12.4                

  



             (test code = 754)                                        

 

             NUCLEATED RED BLOOD CELLS 0 /100 WBC   0-0                       



             (BEAKER) (test code = 413)                                        

 

             NEUTROPHILS RELATIVE PERCENT 62 %                                  

 



             (BEAKER) (test code = 429)                                        

 

             LYMPHOCYTES RELATIVE PERCENT 26 %                                  

 



             (BEAKER) (test code = 430)                                        

 

             MONOCYTES RELATIVE PERCENT 9 %                                    



             (BEAKER) (test code = 431)                                        

 

             EOSINOPHILS RELATIVE PERCENT 2 %                                   

 



             (BEAKER) (test code = 432)                                        

 

             BASOPHILS RELATIVE PERCENT 0 %                                    



             (BEAKER) (test code = 437)                                        

 

             NEUTROPHILS ABSOLUTE COUNT 4.48 K/ L    1.78-5.38                 



             (BEAKER) (test code = 670)                                        

 

             LYMPHOCYTES ABSOLUTE COUNT 1.87 K/ L    1.32-3.57                 



             (BEAKER) (test code = 414)                                        

 

             MONOCYTES ABSOLUTE COUNT (BEAKER) 0.62 K/ L    0.30-0.82           

      



             (test code = 415)                                        

 

             EOSINOPHILS ABSOLUTE COUNT 0.17 K/ L    0.04-0.54                 



             (BEAKER) (test code = 416)                                        

 

             BASOPHILS ABSOLUTE COUNT (BEAKER) 0.03 K/ L    0.01-0.08           

      



             (test code = 417)                                        

 

             IMMATURE GRANULOCYTES-RELATIVE 1 %          0-1                    

   



             PERCENT (BEAKER) (test code =                                      

  



             2801)                                               



POCT-GLUCOSE METER2020-01-10 19:55:00





             Test Item    Value        Reference Range Interpretation Comments

 

             POC-GLUCOSE METER 369 mg/dL           H            : TESTED A

T [facility] [address]



             (BEAKER) (test code =                                        [facility],



             1538)                                               [id number]:



                                                                 /Techni

christina ID



                                                                 = [id number] for CR

SHERRILL GARCIA



POCT-GLUCOSE METER2020-01-10 16:45:00





             Test Item    Value        Reference Range Interpretation Comments

 

             POC-GLUCOSE METER 272 mg/dL           H            : TESTED A

T [facility] [address]



             (BEAKER) (test code =                                        [facility],



             1538)                                               [address]:



                                                                 /Techni

christina ID



                                                                 = [id number] for SARAH VIDES



POCT-GLUCOSE METER2020-01-10 11:40:00





             Test Item    Value        Reference Range Interpretation Comments

 

             POC-GLUCOSE METER 277 mg/dL           H            : TESTED A

T [facility] [address]



             (BEAKER) (test code =                                        [facility],



             1538)                                               [id number]:



                                                                 /Techni

christina ID



                                                                 = [id number] for SARAH VIDES



BASIC METABOLIC PANEL2020-01-10 10:22:00





             Test Item    Value        Reference Range Interpretation Comments

 

             SODIUM (BEAKER) 132 meq/L    136-145      L            



             (test code = 381)                                        

 

             POTASSIUM (BEAKER) 4.4 meq/L    3.5-5.1                   



             (test code = 379)                                        

 

             CHLORIDE (BEAKER) 103 meq/L                        



             (test code = 382)                                        

 

             CO2 (BEAKER) (test 21 meq/L     22-29        L            



             code = 355)                                         

 

             BLOOD UREA NITROGEN 14 mg/dL     7-21                      



             (BEAKER) (test code                                        



             = 354)                                              

 

             CREATININE (BEAKER) 0.89 mg/dL   0.57-1.25                 



             (test code = 358)                                        

 

             GLUCOSE RANDOM 428 mg/dL           HH           



             (BEAKER) (test code                                        



             = 652)                                              

 

             CALCIUM (BEAKER) 9.2 mg/dL    8.4-10.2                  



             (test code = 697)                                        

 

             EGFR (BEAKER) (test 119 mL/min/1.73                           ESTIM

ATED GFR IS



             code = 1092) sq m                                   NOT AS ACCURATE

 AS



                                                                 CREATININE



                                                                 CLEARANCE IN



                                                                 PREDICTING



                                                                 GLOMERULAR



                                                                 FILTRATION RATE

.



                                                                 ESTIMATED GFR I

S



                                                                 NOT APPLICABLE 

FOR



                                                                 DIALYSIS PATIEN

TS.



 ID - NTPLIPID PANEL2020-01-10 10:17:00





             Test Item    Value        Reference Range Interpretation Comments

 

             TRIGLYCERIDES (BEAKER) (test code = 692 mg/dL                      

        



             540)                                                

 

             CHOLESTEROL (BEAKER) (test code = 196 mg/dL                        

      



             631)                                                

 

             HDL CHOLESTEROL (BEAKER) (test code 24 mg/dL                       

        



             = 976)                                              



Calculated LDL not valid if triglyceride &gt;400 mg/dLTriglyceride Reference 
Range:   Low Risk  &lt;150   Borderline    150-199   High Risk     200-499   
Very High Risk  &gt;=500Cholesterol Reference Range:   Low Risk         &lt;200 
 Borderline    200-239    High Risk        &gt;240HDL Cholesterol Reference 
Range:   Low Risk         &gt;=60   High Risk         &lt;40LDL Cholesterol 
ReferenceRange:   Optimal          &lt;100   Near Optimal  100-129   Borderline 
  130-159   High          160-189   Very High       &gt;=190    ID - NTP
POCT-GLUCOSE METER2020-01-10 08:28:00





             Test Item    Value        Reference Range Interpretation Comments

 

             POC-GLUCOSE METER 388 mg/dL           H            : TESTED A

T [facility] [address]



             (BEAKER) (test code =                                        [place],



             1538)                                               [address]:



                                                                 /Techni

christina ID



                                                                 = [id number] for ANANT CATES



HEMOGLOBIN A1C2020-01-10 07:54:00





             Test Item    Value        Reference Range Interpretation Comments

 

             HEMOGLOBIN A1C (BEAKER) (test code = 10.4 %       4.3-6.1      H   

         



             368)                                                



CBC W/PLT COUNT &amp; AUTO DIFFERENTIAL2020-01-10 05:56:00





             Test Item    Value        Reference Range Interpretation Comments

 

             WHITE BLOOD CELL COUNT (BEAKER) 6.5 K/ L     3.5-10.5              

    



             (test code = 775)                                        

 

             RED BLOOD CELL COUNT (BEAKER) 5.21 M/ L    4.63-6.08               

  



             (test code = 761)                                        

 

             HEMOGLOBIN (BEAKER) (test code = 14.6 GM/DL   13.7-17.5            

     



             410)                                                

 

             HEMATOCRIT (BEAKER) (test code = 44.3 %       40.1-51.0            

     



             411)                                                

 

             MEAN CORPUSCULAR VOLUME (BEAKER) 85.0 fL      79.0-92.2            

     



             (test code = 753)                                        

 

             MEAN CORPUSCULAR HEMOGLOBIN 28.0 pg      25.7-32.2                 



             (BEAKER) (test code = 751)                                        

 

             MEAN CORPUSCULAR HEMOGLOBIN CONC 33.0 GM/DL   32.3-36.5            

     



             (BEAKER) (test code = 752)                                        

 

             RED CELL DISTRIBUTION WIDTH 15.6 %       11.6-14.4    H            



             (BEAKER) (test code = 412)                                        

 

             PLATELET COUNT (BEAKER) (test 294 K/CU MM  150-450                 

  



             code = 756)                                         

 

             MEAN PLATELET VOLUME (BEAKER) 11.2 fL      9.4-12.4                

  



             (test code = 754)                                        

 

             NUCLEATED RED BLOOD CELLS 0 /100 WBC   0-0                       



             (BEAKER) (test code = 413)                                        

 

             NEUTROPHILS RELATIVE PERCENT 56 %                                  

 



             (BEAKER) (test code = 429)                                        

 

             LYMPHOCYTES RELATIVE PERCENT 31 %                                  

 



             (BEAKER) (test code = 430)                                        

 

             MONOCYTES RELATIVE PERCENT 10 %                                   



             (BEAKER) (test code = 431)                                        

 

             EOSINOPHILS RELATIVE PERCENT 2 %                                   

 



             (BEAKER) (test code = 432)                                        

 

             BASOPHILS RELATIVE PERCENT 1 %                                    



             (BEAKER) (test code = 437)                                        

 

             NEUTROPHILS ABSOLUTE COUNT 3.66 K/ L    1.78-5.38                 



             (BEAKER) (test code = 670)                                        

 

             LYMPHOCYTES ABSOLUTE COUNT 2.03 K/ L    1.32-3.57                 



             (BEAKER) (test code = 414)                                        

 

             MONOCYTES ABSOLUTE COUNT (BEAKER) 0.63 K/ L    0.30-0.82           

      



             (test code = 415)                                        

 

             EOSINOPHILS ABSOLUTE COUNT 0.15 K/ L    0.04-0.54                 



             (BEAKER) (test code = 416)                                        

 

             BASOPHILS ABSOLUTE COUNT (BEAKER) 0.03 K/ L    0.01-0.08           

      



             (test code = 417)                                        

 

             IMMATURE GRANULOCYTES-RELATIVE 1 %          0-1                    

   



             PERCENT (BEAKER) (test code =                                      

  



             2801)                                               



POCT-GLUCOSE [id number] 23:47:00





             Test Item    Value        Reference Range Interpretation Comments

 

             POC-GLUCOSE METER 359 mg/dL           H            : Notified

 RN/MD:



             (KUN) (test code =                                        TESTED

 AT [facility] [address]



             1538)                                               [facility],



                                                                 [id number]:



                                                                 /Techni

christina ID



                                                                 = [id number] for



                                                                 ALESSIA HUIZAR



POCT-GLUCOSE [id number] 21:13:00





             Test Item    Value        Reference Range Interpretation Comments

 

             POC-GLUCOSE METER 315 mg/dL           H            : TESTED A

T [facility] [address]



             (BEAKER) (test code =                                        [facility],



             1538)                                               [address]:



                                                                 /Techni

christina ID



                                                                 = [id number] for Sm

Angel greshama



POCT-GLUCOSE [id number] 21:13:00





             Test Item    Value        Reference Range Interpretation Comments

 

             POC-GLUCOSE METER 331 mg/dL           H            : TESTED A

T [facility] [address]



             (BEAKER) (test code =                                        [facility],



             1538)                                               [address]:



                                                                 /Techni

christina ID



                                                                 = [id number] for RO

OSCAR KUO



POCT-GLUCOSE [id number] 10:30:00





             Test Item    Value        Reference Range Interpretation Comments

 

             POC-GLUCOSE METER 341 mg/dL           H            : TESTED A

T [facility] [address]



             (BEAKER) (test code =                                        [facility],



             1538)                                               [address]:



                                                                 /Techni

christina ID



                                                                 = [id number] for Sm

ith,



                                                                 Nicki



CBC W/PLT COUNT &amp; AUTO [id number] 08:48:00





             Test Item    Value        Reference Range Interpretation Comments

 

             WHITE BLOOD CELL COUNT (BEAKER) 6.7 K/ L     3.5-10.5              

    



             (test code = 775)                                        

 

             RED BLOOD CELL COUNT (BEAKER) 5.28 M/ L    4.63-6.08               

  



             (test code = 761)                                        

 

             HEMOGLOBIN (BEAKER) (test code = 14.6 GM/DL   13.7-17.5            

     



             410)                                                

 

             HEMATOCRIT (BEAKER) (test code = 44.9 %       40.1-51.0            

     



             411)                                                

 

             MEAN CORPUSCULAR VOLUME (BEAKER) 85.0 fL      79.0-92.2            

     



             (test code = 753)                                        

 

             MEAN CORPUSCULAR HEMOGLOBIN 27.7 pg      25.7-32.2                 



             (BEAKER) (test code = 751)                                        

 

             MEAN CORPUSCULAR HEMOGLOBIN CONC 32.5 GM/DL   32.3-36.5            

     



             (BEAKER) (test code = 752)                                        

 

             RED CELL DISTRIBUTION WIDTH 15.3 %       11.6-14.4    H            



             (BEAKER) (test code = 412)                                        

 

             PLATELET COUNT (BEAKER) (test 300 K/CU MM  150-450                 

  



             code = 756)                                         

 

             MEAN PLATELET VOLUME (BEAKER) 10.4 fL      9.4-12.4                

  



             (test code = 754)                                        

 

             NUCLEATED RED BLOOD CELLS 0 /100 WBC   0-0                       



             (BEAKER) (test code = 413)                                        



(MANUAL DIFFERENTIAL)2020 08:48:00





             Test Item    Value        Reference Range Interpretation Comments

 

             NEUTROPHILS - REL (DIFF) (BEAKER) 69 %                             

      



             (test code = 1359)                                        

 

             LYMPHOCYTES - REL (DIFF) (BEAKER) 25 %                             

      



             (test code = 1360)                                        

 

             MONOCYTES - REL (DIFF) (BEAKER) 3 %                                

    



             (test code = 1361)                                        

 

             EOSINOPHILS - REL (DIFF) (BEAKER) 3 %                              

      



             (test code = 1362)                                        

 

             BASOPHILS - REL (DIFF) (BEAKER) 0 %                                

    



             (test code = 1363)                                        

 

             NEUTROPHILS - ABS (DIFF) (BEAKER) 4.62 K/ L    1.80-8.00           

      



             (test code = 1365)                                        

 

             LYMPHOCYTES - ABS (DIFF) (BEAKER) 1.68 K/ L    1.48-4.50           

      



             (test code = 1366)                                        

 

             MONOCYTES - ABS (DIFF) (BEAKER) 0.20 K/ L    0.00-1.30             

    



             (test code = 1367)                                        

 

             EOSINOPHILS - ABS (DIFF) (BEAKER) 0.20 K/ L    0.00-0.50           

      



             (test code = 1368)                                        

 

             BASOPHILS - ABS (DIFF) (BEAKER) 0.00 K/ L    0.00-0.20             

    



             (test code = 1369)                                        

 

             TOTAL COUNTED (BEAKER) (test code = 100                            

        



             1351)                                               

 

             PLT MORPHOLOGY (BEAKER) (test code Normal                          

       



             = 486)                                              

 

             RBC MORPHOLOGY (BEAKER) (test code Normal                          

       



             = 762)                                              

 

             SMUDGE CELLS (BEAKER) (test code = Present                         

       



             1371)                                               



HEMOGLOBIN [id number] 06:39:00





             Test Item    Value        Reference Range Interpretation Comments

 

             HEMOGLOBIN A1C (BEAKER) (test code = 10.5 %       4.3-6.1      H   

         



             368)                                                



LIPID [id number] 04:57:00





             Test Item    Value        Reference Range Interpretation Comments

 

             TRIGLYCERIDES (BEAKER) 1251 mg/dL                             Speci

men moderately



             (test code = 540)                                        hemolyzed

 

             CHOLESTEROL (BEAKER) 215 mg/dL                              Specime

n moderately



             (test code = 631)                                        hemolyzed

 

             HDL CHOLESTEROL 22 mg/dL                               



             (BEAKER) (test code =                                        



             976)                                                



Calculated LDL not valid if triglyceride &gt;400 mg/dLTriglyceride Reference 
Range:   Low Risk  &lt;150   Borderline    150-199   High Risk     200-499   
Very High Risk  &gt;=500Cholesterol Reference Range:   Low Risk         &lt;200 
 Borderline    200-239    High Risk        &gt;240HDL Cholesterol Reference 
Range:   Low Risk         &gt;=60   High Risk         &lt;40LDL Cholesterol 
ReferenceRange:   Optimal          &lt;100   Near Optimal  100-129   Borderline 
  130-159   High          160-189   Very High       &gt;=190   Specimen 
moderately lipemicBASIC METABOLIC [id number] 04:56:00





             Test Item    Value        Reference Range Interpretation Comments

 

             SODIUM (BEAKER) 137 meq/L    136-145                   



             (test code = 381)                                        

 

             POTASSIUM (BEAKER) 4.6 meq/L    3.5-5.1                   Specimen 

moderately



             (test code = 379)                                        hemolyzed

 

             CHLORIDE (BEAKER) 106 meq/L                        



             (test code = 382)                                        

 

             CO2 (BEAKER) (test 20 meq/L     22-29        L            



             code = 355)                                         

 

             BLOOD UREA NITROGEN 12 mg/dL     7-21                      



             (BEAKER) (test code                                        



             = 354)                                              

 

             CREATININE (BEAKER) 0.95 mg/dL   0.57-1.25                 Specimen

 moderately



             (test code = 358)                                        hemolyzed

 

             GLUCOSE RANDOM 398 mg/dL           H            



             (BEAKER) (test code                                        



             = 652)                                              

 

             CALCIUM (BEAKER) 8.8 mg/dL    8.4-10.2                  



             (test code = 697)                                        

 

             EGFR (BEAKER) (test 110 mL/min/1.73                           ESTIM

ATED GFR IS



             code = 1092) sq m                                   NOT AS ACCURATE

 AS



                                                                 CREATININE



                                                                 CLEARANCE IN



                                                                 PREDICTING



                                                                 GLOMERULAR



                                                                 FILTRATION RATE

.



                                                                 ESTIMATED GFR I

S



                                                                 NOT APPLICABLE 

FOR



                                                                 DIALYSIS PATIEN

TS.



POCT-GLUCOSE [id number] 21:53:00





             Test Item    Value        Reference Range Interpretation Comments

 

             POC-GLUCOSE METER > mg/dL             HH           : Notified

 RN/MD: TESTED



             (BEAKER) (test code =                                        AT [facility] [address] [facility]



             [address])                                               [facility], [facility]

30:



                                                                 /Techni

christina ID =



                                                                 [id number] for ZECHARIAH TRACY



U/S, ABDOMINAL, [id number] 19:54:00Reason for exam:-&gt;Evaluation of  
shunt and for possible cyst or pseudocyst Should this be performed at the 
bedside?-&gt;YesFINAL REPORT PATIENT ID:   [id number] Limited abdominal 
ultrasound. CLINICAL HISTORY: Evaluation of VPshunt for possible cyst or 
pseudocyst. COMPARISON STUDY: None available. FINDINGS: Sonographic assessment 
of the abdomen was performed assessing for fluid in the region of the patient's 
shunts. No fluid collections are seen. However, the study is limited by the 
patient's body habitus. CT scan would bemore sensitive. Signed: Brandyn Hendrickson 
MDReport Verified Date/Time:  2020 19:54:10 Reading Location: [facility] C013W
Consult Reading Room      Electronically signed by: BRANDYN HENDRICKSON M.D. on 
2020 07:54 PMPOCT-GLUCOSE [id number] 19:34:00





             Test Item    Value        Reference Range Interpretation Comments

 

             POC-GLUCOSE METER 474 mg/dL           HH           : Notified

 RN/MD:



             (BEAKER) (test code =                                        TESTED

 AT [facility] [address]



             [address])                                               [facility],



                                                                 [address]:



                                                                 /Techni

christina ID



                                                                 = [id number] for LONA URIOSTEGUI



URINALYSIS W/ REFLEX URINE [id number] 17:48:00





             Test Item    Value        Reference Range Interpretation Comments

 

             COLOR (BEAKER) (test code = 470) Yellow                            

     

 

             CLARITY (BEAKER) (test code = Hazy                                 

  



             469)                                                

 

             SPECIFIC GRAVITY UA (BEAKER) 1.020        1.001-1.035              

 



             (test code = 468)                                        

 

             PH UA (BEAKER) (test code = 467) 6.0          5.0-8.0              

     

 

             PROTEIN UA (BEAKER) (test code = 20 mg/dL     Negative     A       

     



             464)                                                

 

             GLUCOSE UA (BEAKER) (test code = >1000 mg/dL  Negative     A       

     



             365)                                                

 

             KETONES UA (BEAKER) (test code = 40 mg/dL     Negative     A       

     



             371)                                                

 

             BILIRUBIN UA (BEAKER) (test code Negative     Negative             

     



             = 462)                                              

 

             BLOOD UA (BEAKER) (test code = Moderate     Negative     A         

   



             461)                                                

 

             NITRITE UA (BEAKER) (test code = Positive     Negative     A       

     



             465)                                                

 

             LEUKOCYTE ESTERASE UA (BEAKER) Large        Negative     A         

   



             (test code = 466)                                        

 

             UROBILINOGEN UA (BEAKER) (test 0.2 mg/dL    0.2-1.0                

   



             code = 463)                                         

 

             RBC UA (BEAKER) (test code = 519) 0 /HPF                           

      

 

             WBC UA (BEAKER) (test code = 520) 267 /HPF                         

      

 

             BACTERIA (BEAKER) (test code = Moderate                            

   



             517)                                                

 

             SOURCE(BEAKER) (test code = 2795)                                  

      



CBC W/PLT COUNT &amp; AUTO [id number] 17:38:00





             Test Item    Value        Reference Range Interpretation Comments

 

             WHITE BLOOD CELL COUNT (BEAKER) 7.8 K/ L     3.5-10.5              

    



             (test code = 775)                                        

 

             RED BLOOD CELL COUNT (BEAKER) 5.12 M/ L    4.63-6.08               

  



             (test code = 761)                                        

 

             HEMOGLOBIN (BEAKER) (test code = 14.7 GM/DL   13.7-17.5            

     



             410)                                                

 

             HEMATOCRIT (BEAKER) (test code = 43.3 %       40.1-51.0            

     



             411)                                                

 

             MEAN CORPUSCULAR VOLUME (BEAKER) 84.6 fL      79.0-92.2            

     



             (test code = 753)                                        

 

             MEAN CORPUSCULAR HEMOGLOBIN 28.7 pg      25.7-32.2                 



             (BEAKER) (test code = 751)                                        

 

             MEAN CORPUSCULAR HEMOGLOBIN CONC 33.9 GM/DL   32.3-36.5            

     



             (BEAKER) (test code = 752)                                        

 

             RED CELL DISTRIBUTION WIDTH 15.3 %       11.6-14.4    H            



             (BEAKER) (test code = 412)                                        

 

             PLATELET COUNT (BEAKER) (test 344 K/CU MM  150-450                 

  



             code = 756)                                         

 

             MEAN PLATELET VOLUME (BEAKER) 11.0 fL      9.4-12.4                

  



             (test code = 754)                                        

 

             NUCLEATED RED BLOOD CELLS 0 /100 WBC   0-0                       



             (BEAKER) (test code = 413)                                        



(CELLAVISION MANUAL DIFF)2020 17:38:00





             Test Item    Value        Reference Range Interpretation Comments

 

             NEUTROPHILS - REL 63 %                                   



             (CELLAVISION)(BEAKER) (test code                                   

     



             = 2816)                                             

 

             LYMPHOCYTES - REL 23 %                                   



             (CELLAVISION)(BEAKER) (test code                                   

     



             = 2817)                                             

 

             MONOCYTES - REL 6 %                                    



             (CELLAVISION)(BEAKER) (test code                                   

     



             = 2818)                                             

 

             EOSINOPHILS - REL 5 %                                    



             (CELLAVISION)(BEAKER) (test code                                   

     



             = 2819)                                             

 

             BASOPHILS - REL 1 %                                    



             (CELLAVISION)(BEAKER) (test code                                   

     



             = 2820)                                             

 

             BANDS - REL (CELLAVISION)(BEAKER) 2 %          0-10                

      



             (test code = 0866)                                        

 

             NEUTROPHILS - ABS 4.91 K/ul    1.78-5.38                 



             (CELLAVISION)(BEAKER) (test code                                   

     



             = 2830)                                             

 

             LYMPHOCYTES - ABS 1.79 K/ul    1.32-3.57                 



             (CELLAVISION)(BEAKER) (test code                                   

     



             = 2831)                                             

 

             MONOCYTES - ABS 0.47 K/uL    0.30-0.82                 



             (CELLAVISION)(BEAKER) (test code                                   

     



             = 2832)                                             

 

             EOSINOPHILS - ABS 0.39 K/uL    0.04-0.54                 



             (CELLAVISION)(BEAKER) (test code                                   

     



             = 2834)                                             

 

             BASOPHILS - ABS 0.08 K/uL    0.01-0.08                 



             (CELLAVISION)(BEAKER) (test code                                   

     



             = 2835)                                             

 

             BANDS - ABS (CELLAVISION)(BEAKER) 0.16 K/uL    0.00-0.80           

      



             (test code = 2840)                                        

 

             TOTAL COUNTED (BEAKER) (test code 100                              

      



             = 1351)                                             

 

             SMUDGE CELLS (BEAKER) (test code Present                           

     



             = 1371)                                             

 

             GIANT PLATELETS (BEAKER) (test Present                             

   



             code = 313)                                         

 

             ANISOCYTOSIS (BEAKER) (test code 1+ few                            

     



             = 961)                                              

 

             POIKILOCYTES (BEAKER) (test code 2+ moderate                       

     



             = 966)                                              

 

             SPHEROCYTES (BEAKER) (test code = 1+ few                           

      



             768)                                                

 

             OVALOCYTES (BEAKER) (test code = 1+ few                            

     



             477)                                                

 

             TEAR DROP CELLS (BEAKER) (test 1+ few                              

   



             code = 481)                                         

 

             ARTIFACT (CELLAVISION)(BEAKER) Present                             

   



             (test code = 3432)                                        

 

             PLATELET CONCENTRATION Adequate                               



             (CELLAVISION)(BEAKER) (test code                                   

     



             = 3438)                                             



Received comment: User comments: Slide comments:POCT-GLUCOSE [id number] 
16:27:00





             Test Item    Value        Reference Range Interpretation Comments

 

             POC-GLUCOSE METER 424 mg/dL           HH           : Notified

 RN/MD:



             (KUN) (test code =                                        TESTED

 AT [facility] [address])                                               [place] [facility],



                                                                 [address]:



                                                                 /Techni

christina ID



                                                                 = [id number] for AK

INSONU,



                                                                 LONA



CT, BRAIN, WITHOUT [id number] 15:31:00FINAL REPORT PATIENT ID:   
[id number] CT, BRAIN, WITHOUT CONTRAST CLINICAL INDICATION:  Hydrocephalus C
OMPARISON: None TECHNIQUE:  Noncontrast axial CT imaging of the brain and skull.
 DOSE REDUCTION: Dose modulation, iterative reconstruction, and/or weight-based 
adjustment of the mA/kV was utilized to reduce the radiation dose to as low as 
reasonably achievable. FINDINGS:A total of two ventricular drainage catheters 
are present. A right transverse temporal catheter terminates across the midline 
just above the level of the foramen of Monro. A right parietal approach catheter
terminates in the pineal region. Supratentorial ventricular configuration is 
slitlike. There is no herniation. The basilar cisterns are preserved. There is 
no identifiable recent infarct. 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 Calvarial configuration escape of 
cephalic. Thereis no acute osseous abnormality.  IMPRESSION: Chronic, likely 
congenital parenchymal changes withoutrecent infarct or hemorrhage. A total of 
two ventricular drainage catheters are present. Supratentorial ventricular 
configuration is slitlike and may represent over shunting. Correlation 
recommended. Signed: JR Rae Robert MDReport Verified Date/Time:  
2020 15:31:08 Reading Location: [facility] C0[facility] Neuro Reading Room    
Electronically signed by: ALONDRA RAE on 2020 03:31 PMRAD, 
SHUNT [id number] 15:13:00Reason for exam:-&gt;concern for VPS malfunction
FINAL REPORT PATIENT ID:   [id number] RAD, SHUNT SERIES INDICATION: concern for 
VPS malfunction COMPARISON: None TECHNIQUE: AP and lateral radiographs of the 
skull, abdomen and chest were acquired to evaluate shunt catheter FINDINGS:An 
abandoned shunt catheter is present within the right neck. Medial tothis a 
second shunt catheter is present which descends along the left chest wall, 
terminating withinthe mid pelvis. Radiopaque portions of the catheter appear 
contiguous. Signed: Colby Tan Verified Date/Time:  2020 
15:13:54 [facility]  Electronically 
signed by: COLBY TAN MD on 2020 03:13 PMPOCT-GLUCOSE METER
2020 11:38:00





             Test Item    Value        Reference Range Interpretation Comments

 

             POC-GLUCOSE METER 394 mg/dL           H            : TESTED A

T [facility] [address]



             (BEAKER) (test code =                                        [place],



             [address])                                               [id number]:



                                                                 /Techni

christina ID



                                                                 = [id number] for LONA URIOSTEGUI



HEMOGLOBIN [id number] 09:15:00





             Test Item    Value        Reference Range Interpretation Comments

 

             HEMOGLOBIN A1C (BEAKER) (test code = 10.4 %       4.3-6.1      H   

         



             368)                                                



TSH/FREE T4 IF [id number] 07:54:00





             Test Item    Value        Reference Range Interpretation Comments

 

             THYROID STIMULATING HORMONE 1.40 uIU/mL  0.35-4.94                 



             (BEAKER) (test code = 772)                                        



POCT-GLUCOSE [id number] 07:48:00





             Test Item    Value        Reference Range Interpretation Comments

 

             POC-GLUCOSE METER > mg/dL             HH           : Notified

 RN/MD: TESTED



             (BEAKER) (test code =                                        AT [address]



             [address])                                               [facility], [address]

30:



                                                                 /Techni

christina ID =



                                                                 [id number] for LONA GOLDSMITH



BASIC METABOLIC [id number] 07:44:00





             Test Item    Value        Reference Range Interpretation Comments

 

             SODIUM (BEAKER) 134 meq/L    136-145      L            



             (test code = 381)                                        

 

             POTASSIUM (BEAKER) 4.4 meq/L    3.5-5.1                   



             (test code = 379)                                        

 

             CHLORIDE (BEAKER) 103 meq/L                        



             (test code = 382)                                        

 

             CO2 (BEAKER) (test 20 meq/L     22-29        L            



             code = 355)                                         

 

             BLOOD UREA NITROGEN 17 mg/dL     7-21                      



             (BEAKER) (test code                                        



             = 354)                                              

 

             CREATININE (BEAKER) 1.09 mg/dL   0.57-1.25                 



             (test code = 358)                                        

 

             GLUCOSE RANDOM 464 mg/dL           HH           



             (BEAKER) (test code                                        



             = 652)                                              

 

             CALCIUM (BEAKER) 8.6 mg/dL    8.4-10.2                  



             (test code = 697)                                        

 

             EGFR (BEAKER) (test 94 mL/min/1.73                           ESTIMA

MANJEET GFR IS



             code = 1092) sq m                                   NOT AS ACCURATE

 AS



                                                                 CREATININE



                                                                 CLEARANCE IN



                                                                 PREDICTING



                                                                 GLOMERULAR



                                                                 FILTRATION RATE

.



                                                                 ESTIMATED GFR I

S



                                                                 NOT APPLICABLE 

FOR



                                                                 DIALYSIS PATIEN

TS.



LIPID [id number] 07:34:00





             Test Item    Value        Reference Range Interpretation Comments

 

             TRIGLYCERIDES (BEAKER) (test code = 750 mg/dL                      

        



             540)                                                

 

             CHOLESTEROL (BEAKER) (test code = 196 mg/dL                        

      



             631)                                                

 

             HDL CHOLESTEROL (BEAKER) (test code 22 mg/dL                       

        



             = 976)                                              



Calculated LDL not valid if triglyceride &gt;400 mg/dLTriglyceride Reference 
Range:   Low Risk  &lt;150   Borderline    150-199   High Risk     200-499   
Very High Risk  &gt;=500Cholesterol Reference Range:   Low Risk         &lt;200 
 Borderline    200-239    High Risk        &gt;240HDL Cholesterol Reference 
Range:   Low Risk         &gt;=60   High Risk         &lt;40LDL Cholesterol 
ReferenceRange:   Optimal          &lt;100   Near Optimal  100-129   Borderline 
  130-159   High          160-189   Very High       &gt;=190POCT-GLUCOSE METER
2020 00:49:00





             Test Item    Value        Reference Range Interpretation Comments

 

             POC-GLUCOSE METER 399 mg/dL           H            : TESTED A

T [facility] [address]



             (KUN) (test code =                                        [place],



             1538)                                               [address]:



                                                                 /Techni

christina ID



                                                                 = [id number] for CLAY BATRES



RAD, FOOT, 2 VIEWS, [id number] 22:28:00Reason for exam:-&gt;osteomyletitis
FINAL REPORT PATIENT ID:   [id number] TECHNIQUE: Two views each of the bilateral 
feet HISTORY: osteomyletitis. COMPARISON: None. IMPRESSION:No acute displaced 
fracture or dislocation. Joint spaces are within normal limits.Severe soft 
tissue swelling.On the right subcentimeter nonspecific calcifications adjacent 
to the heel plantar aspect. Correlate with physical exam. Severely limited exam 
due to patient body habitus. No definite cortical irregularity.There is clinical
concern for osteomyelitis, recommend MRI with contrast. Signed: Patricia Townsend 
MDReport Verified Date/Time:  2020 22:28:25 Reading Location: [facility] C013W
Consult Reading Room  Electronically signed by: PATRICIA TOWNSEND DO on 
2020 10:28 PMRAD, FOOT, 2 VIEWS, [id number] 22:28:00Reason for 
exam:-&gt;osteomyletitsFINAL REPORT PATIENT ID:   [id number] TECHNIQUE: Two views 
each of the bilateral feet HISTORY: osteomyletitis. COMPARISON: None. 
IMPRESSION:No acute displaced fracture or dislocation. Joint spaces are within 
normal limits.Severe soft tissue swelling.On the right subcentimeter nonspecific
calcifications adjacent to the heel plantar aspect. Correlate with physical 
exam. Severely limited exam due to patient body habitus. No definite cortical 
irregularity.There is clinical concern for osteomyelitis, recommend MRI with 
contrast. Signed: Patricia Townsend MDReport Verified Date/Time:  2020 
22:28:25 Reading Location: [facility] B1 C013W Consult Reading Room  Electronically 
signed by: PATRICIA TOWNSEND DO on 2020 10:28 PMPOCT-GLUCOSE METER
2020 21:04:00





             Test Item    Value        Reference Range Interpretation Comments

 

             POC-GLUCOSE METER 430 mg/dL           HH           : Notified

 RN/MD:



             (KUN) (test code =                                        TESTED

 AT [facility] [address]



             1538)                                               [facility],



                                                                 [address]:



                                                                 /Techni

christina ID



                                                                 = [id number] for DEYSI ADRIAN



ERTAPENEM:SUSC:PT:ISOLATE:ORDQN:[id number] 16:48:00Prote[facility] mirabilisMemorial 
HermannURINE AND DNZVJ4234-09-22 16:48:00Negative (18 10:48 AM)[facility] WMVBC7515-51-42 16:48:00Negative *NA*(18 10:48 AM)[facility] EGGKJ3845-62-38 16:48:00Negative *NA*(18 10:48 AM)[facility] BCFQZ0547-62-28 16:48:00Trace *ABN*(18 10:48 AM)[facility] 
[facility] AND QIHXJ6144-60-44 16:48:000.2[facility]ann[facility] STOOL
2018 16:48:00Large *ABN*(18 10:48 AM)[facility] STOOL
2018 16:48:00Negative (18 10:48 AM)[facility] STOOL
2018 16:48:00Negative (18 10:48 AM)[facility] STOOL
2018 16:48:00





             Test Item    Value        Reference Range Interpretation Comments

 

             UA pH (test code = UA pH) 7.0 1        5.0-8.0                   



[facility] SLQIS5651-49-32 16:48:00





             Test Item    Value        Reference Range Interpretation Comments

 

             UA Spec Grav (test code = UA Spec 1.015 1                          

      



             Grav)                                               



[facility] QBHDJ2283-51-27 16:48:00Yellow *NA*(18 10:48 AM)
[facility] YEGXW8761-47-96 16:48:00Clear (18 10:48 AM)
[facility] QXDHQ6170-38-65 16:48:00None Seen (18 10:48 AM)
[facility] PMQNWVH1023-73-34 11:57:00Negative (18 5:57 AM)
[facility]zpAEARJCMFZP0693[facility]69-73 11:57:00





             Test Item    Value        Reference Range Interpretation Comments

 

             PTT (test code = PTT) 33.4 s       22.9-35.8                 



[facility] HospitalWuctxvjNLAOOKQNYC6143-05-14 11:57:00





             Test Item    Value        Reference Range Interpretation Comments

 

             PT (test code = PT) 13.7 s       12.0-14.7                 



[facility] HospitalNgrfyfsFNYEQZANSX6220-61-28 11:57:00





             Test Item    Value        Reference Range Interpretation Comments

 

             INR (test code = INR) 1.05 1       0.85-1.17                 



[facility] FNYPE4821-66-15 10:50:83249Ztdeaqas HermannCHEM PANEL
2018 10:50:019.4[facility] HermannCHEM MZZUT9953-21-83 10:50:0128[facility] 
HermannCHEM AZOMR2181-58-70 10:50:0114[facility] HermannCHEM AANEA6166-35-62 
10:50:0189[facility] HermannCHEM WVWKN5478-74-36 10:50:98522Nqyffoky HermannCHEM 
NRLLC5214-69-93 10:50:014.2[facility] HermannCHEM ZRHPF4020-79-58 10:50:39909
[facility] HermannCHEM JQUIF6887-91-69 10:50:010.85[facility] HermannCHEM PANEL
2018 10:50:019.2[facility] HdkonqoJNZIETDGZK8386-42-52 10:50:01





             Test Item    Value        Reference Range Interpretation Comments

 

             ACT (TEG) Rapid (test code = ACT (TEG) 136 s                 

           



             Rapid)                                              



[facility] HospitalXlkcnukNYLDMLUJUR2782-46-31 10:50:01





             Test Item    Value        Reference Range Interpretation Comments

 

             Split Point Rapid (test code = Split 0.6 min                       

         



             Point Rapid)                                        



[facility] HospitalQfvbcpkVHCJYGPBAH4914-74-11 10:50:01





             Test Item    Value        Reference Range Interpretation Comments

 

             R-time Rapid (test code = R-time 0.9 min      0.4-0.7              

     



             Rapid)                                              



[facility] HospitalFqmccnkIIPKOLOVUX7111-26-37 10:50:01





             Test Item    Value        Reference Range Interpretation Comments

 

             K-time Rapid (test code = K-time 1.4 min      0.6-2.3              

     



             Rapid)                                              



[facility] HospitalGqlhmdvOHGZQQVRII8698-02-29 10:50:01





             Test Item    Value        Reference Range Interpretation Comments

 

             Angle Rapid (test code = Angle 71 degrees   64-80                  

   



             Rapid)                                              



[facility] HospitalXzrcqjdMOORZMXPBD7906-39-68 10:50:0112.7[facility] HermannHEMATOLOGY
2018 10:50:01





             Test Item    Value        Reference Range Interpretation Comments

 

             Max Amplitude Rapid (test code = Max 72 mm        52-71            

         



             Amplitude Rapid)                                        



[facility] HospitalOrzajtyKLBYHHGCHO5596-03-47 10:50:010.1[facility] HermannHEMATOLOGY
2018 10:50:83188Zkxnynmo MxcoqpoETDJILDLVH7201-98-82 10:50:017.8[facility]orial 
YjpxsqiKPJFDRDJYM4083-28-40 10:50:01





             Test Item    Value        Reference Range Interpretation Comments

 

             MCH (test code = MCH) 27.4 pg      27.0-31.0                 



[facility] HospitalOividkgAXXAAUBBYQ6434-49-00 10:50:0180.7[facility] HermannHEMATOLOGY
2018 10:50:0134.0[facility] UrxehugVYHUXGJCAB9620-69-25 10:50:0118.9[facility]
UdeixhdETSPTRKUQO8403-08-31 10:50:0143.3[facility] CdlxbmrUJMQYRWBZZ1121-24-50 
10:50:019.3[facility] FcmqsyjFIIZGWOVWE4311-26-91 10:50:0114.7[facility] Jose
GORUQMNHXN7352-36-65 10:50:015.36[facility] MczopogKWMTMXWDZT8208-32-67 10:50:01
0.2[facility] FiynmzrRYVIICWELS9630-02-37 10:50:010.1[facility] HermannHEMATOLOGY
2018 10:50:011.8[facility] JeogmbwYOTPQOCXWL0779-00-49 10:50:010.9[facility] 
UigounlIRQFSZGLNP6198-26-47 10:50:016.3[facility] GlnmmudYMODNFGNQD3048-82-99 
10:50:012.0[facility] YiinvssSUQAOTEHKA1564-02-47 10:50:0167.4[facility] Jose
SEXRJPZIDP1836-10-36 10:50:0119.9[facility] EpoxvofUMHXLJTIBE6586-50-41 10:50:01
1.0[facility] WhsykrjWZUZKMADBV8833-99-05 10:50:019.7[facility] HermannCHEM PANEL
2018 05:42:00





             Test Item    Value        Reference Range Interpretation Comments

 

             B/C Ratio (test code = B/C Ratio) 17 1         6-25                

      



[facility] HermannCHEM NMGZG4444-56-24 05:42:004.3[facility] HermannCHEM PANEL
2018 05:42:00





             Test Item    Value        Reference Range Interpretation Comments

 

             A/G Ratio (test code = A/G Ratio) 0.7 1        0.7-1.6             

      



[facility] HermannCHEM KIYIR1586-03-55 05:42:0014.4[facility] HermannCHEM PANEL
2018 05:42:45321Iypjzgcy HermannCHEM VRKGN8634-38-65 05:42:0076Mem[facility] 
HermannCHEM BXRMN6972-08-40 05:42:0035[facility] HermannCHEM NDDBV4943-20-10 
05:42:002.8[facility] HermannCHEM FWKJL6601-25-49 05:42:007.1[facility] HermannCHEM 
GLBAB5506-01-62 05:42:008.7[facility] HermannCHEM SGLBV3738-57-60 05:42:0018
[facility] HermannCHEM WAKWW3951-58-38 05:42:000.3[facility] HermannCHEM PANEL
2018 05:42:004.4[facility] HermannCHEM EWEMY3906-49-29 05:42:03335Dvrghwue 
HermannCHEM LXTRY8639-11-41 05:42:0023[facility] HermannCHEM RKCVY0661-81-82 
05:42:46411Mbbvdbuv HermannCHEM ALHLL4032-25-71 05:42:001.01[facility] HermannCHEM
ASWZC9551-14-02 05:42:0017[facility] HermannCHEM UNWUD5835-57-33 05:42:54454
[facility] QpmshyuDYSHHXCXLL8267-26-08 05:42:000.6[facility] HermannHEMATOLOGY
2018 05:42:004.8[facility] EjkjtioJPAUQTTMEQ2435-98-61 05:42:000.7[facility] 
DekguyzHETNPUHSKR4921-98-37 05:42:001.9[facility] EmaekxjYOPGENSNDH4180-96-92 
05:42:009.4[facility] GigujmnEFETFUTTKK2321-54-81 05:42:000.2[facility] Jose
NGJOXXXBOC6741-43-29 05:42:002.9[facility] XwyulefJFFTIQVQWX5655-66-53 05:42:00
62.2[facility] MdduxblYPMCPNREHY9075-33-23 05:42:0024.9[facility] HermannHEMATOLOGY
2018 05:42:00





             Test Item    Value        Reference Range Interpretation Comments

 

             MCH (test code = MCH) 27.5 pg      27.0-31.0                 



[facility] PalkqngBJOBWRGPPC2203-09-17 05:42:0085.3[facility] HermannHEMATOLOGY
2018 05:42:0043.9[facility] OlsytuaZBSKXLWYID6584-00-92 05:42:0014.2[facility]
HyzysjyXEDEABSHJW0586-06-48 05:42:007.7[facility] KaftshjCDRVYOOJHJ2709-15-40 
05:42:005.15[facility] EbzljsyQMWIGQIORI6133-22-67 05:42:008.4[facility] Jose
GRGRTCBPXW9820-38-01 05:42:0032.3[facility] JoodrfgFJWYETDNTE9577-00-23 05:42:00
17.3[facility] MdregxvMZYJTDLIJE7474-33-53 05:42:15882Kaacbcqg HermannCHEM PANEL
2018 09:36:004.4[facility] HermannCHEM DPOMS8088-54-68 09:36:00





             Test Item    Value        Reference Range Interpretation Comments

 

             A/G Ratio (test code = A/G Ratio) 0.6 1        0.7-1.6             

      



[facility] HermannCHEM YXNJZ2169-02-06 09:36:00





             Test Item    Value        Reference Range Interpretation Comments

 

             B/C Ratio (test code = B/C Ratio) 17 1         6-25                

      



[facility] HermannCHEM HBXFJ7295-57-22 09:36:0011.3[facility] HermannCHEM PANEL
2018 09:36:76867Kxjnvgnf HermannCHEM YXOFF3558-31-96 09:36:000.84[facility] 
HermannCHEM SEVXJ4882-47-97 09:36:51214Cmsievdf HermannCHEM TVCEB8205-52-30 
09:36:0099[facility] HermannCHEM WRNEP2567-28-21 09:36:0014Mem[facility] HermannCHEM 
DITBZ0965-17-83 09:36:0079[facility] HermannCHEM KEUYJ0137-28-23 09:36:000.3
[facility] HermannCHEM RFUCR1913-63-47 09:36:0014Mem[facility] HermannCHEM PANEL
2018 09:36:0043[facility] HermannCHEM ECOPS8766-49-74 09:36:007.1[facility] 
HermannCHEM PBQCH1774-74-56 09:36:002.7[facility] HermannCHEM LAZGB3432-54-78 
09:36:009.2[facility] HermannCHEM ZCDBW3467-85-66 09:36:0021Mem[facility] HermannCHEM 
AMJAJ1069-56-68 09:36:004.3[facility] HermannCHEM FATNL8570-47-08 09:36:98006
[facility] KnhiirmIWNBFVJFYL9837-12-49 09:36:0032.5[facility] HermannHEMATOLOGY
2018 09:36:0017.5[facility] OapgbltDOZHYAESOT4887-63-40 09:36:23103Uzdnbqtg 
NaengrmBOACAUWTVH1365-35-71 09:36:008.5[facility] RqhgeuaUOZPWSWNWV6858-79-62 
09:36:006.6[facility] ZdldnucUXZSZITPSE1876-28-87 09:36:005.17[facility]
HJTUACUNHM2350-71-90 09:36:0086.1[facility] XcmwegrNIASJGYSNB7902-58-27 09:36:00
44.5[facility] XhhxppyEABXMXVNLO8964-50-93 09:36:00





             Test Item    Value        Reference Range Interpretation Comments

 

             MCH (test code = MCH) 28.0 pg      27.0-31.0                 



[facility] PpqwqojVMRFIVWDAO8317-38-87 09:36:0014.5[facility] HermannHEMATOLOGY
2018 09:36:001.7[facility] LfdttkwNZURXQJAYH2033-67-07 09:36:000.7[facility] 
WeoiffrUBEIPPEOZI3583-24-33 09:36:000.2[facility] YmtjujfQIHNHXHYWZ4599-49-09 
09:36:0026.1[facility] VxrlmzxDTPODHIIWC8570-13-64 09:36:0059.3[facility]
MUWONNTRFV5711-74-23 09:36:000.8[facility] NscgiihVEVPDDCEAR5341-31-75 09:36:00
10.5[facility] AlhdpjlEKESUVUKUE4022-99-91 09:36:003.3[facility] HermannHEMATOLOGY
2018 09:36:003.9[facility] YexrccvTDEGQILVCT5888-60-71 21:02:009.1[facility] 
HermannCHEM XAGMK5412-86-81 07:07:0074[facility] HermannCHEM OHJPG5649-95-66 
07:07:0012[facility] HermannCHEM PAWIY7326-54-28 07:07:000.75[facility] HermannCHEM 
MRIXK5730-26-75 07:07:76719Vekhvrik HermannCHEM SHSEJ5715-52-55 07:07:002.9
[facility] HermannCHEM SOZTA8732-46-51 07:07:004.4[facility] HermannCHEM PANEL
2018 07:07:00





             Test Item    Value        Reference Range Interpretation Comments

 

             A/G Ratio (test code = A/G Ratio) 0.7 1        0.7-1.6             

      



[facility] HermannCHEM RRHXH2120-36-91 07:07:000.4[facility] HermannCHEM PANEL
2018 07:07:0071[facility] HermannCHEM IBURP7107-84-81 07:07:0015[facility] 
HermannCHEM ONINX1843-32-83 07:07:0028[facility] HermannCHEM AJSKI8216-78-72 
07:07:004.4[facility] HermannCHEM FKJTP7650-63-33 07:07:13650Pcsoxoqm HermannCHEM 
YAOIB4280-41-65 07:07:0025[facility] HermannCHEM GJDGE6250-58-92 07:07:78420
[facility] HermannCHEM XKVDC5908-26-95 07:07:00





             Test Item    Value        Reference Range Interpretation Comments

 

             B/C Ratio (test code = B/C Ratio) 16 1         6-25                

      



[facility] HermannCHEM FXPHM1301-47-05 07:07:008.7[facility] HermannCHEM PANEL
2018 07:07:0012.4[facility] HermannCHEM IZVTV4459-23-79 07:07:007.3[facility] 
OmczblcIWBPPPHUKI3123-97-77 07:07:23330Tmxmifba HdkehsaWYQRHYMCHM3625-24-80 
07:07:008.7[facility] BftbgowMCGNVZSWRO3561-58-50 07:07:006.9[facility]
OWMKITYLAZ7604-35-21 07:07:005.30[facility] EpjnweoNOQCNVGZUW4579-53-45 07:07:00
32.8[facility] UlzytqpXHPUZPZHOB9989-64-55 07:07:00





             Test Item    Value        Reference Range Interpretation Comments

 

             MCH (test code = MCH) 27.9 pg      27.0-31.0                 



[facility] KlirihhXTQAIZUWRX3062-53-38 07:07:0014.8[facility] HermannHEMATOLOGY
2018 07:07:0085.0[facility] DxomuqnOLCLOCRDSB6152-44-72 07:07:0045.1[facility]
AehlywzLJXGMWOXCO6570-83-07 07:07:0017.5[facility] IypdzakJTXNPRWFPM6263-62-86 
07:07:000.2[facility] YteaqjhIHWQFGLUWQ9782-86-14 07:07:002.0[facility] Jose
TZMCWETJVH8737-14-76 07:07:000.7[facility] TceqvynEAMETYHMSP0996-08-60 07:07:002.4
[facility] JffvpvuMSFHRTEGCV4544-21-20 07:07:000.6[facility] HermannHEMATOLOGY
2018 07:07:004.0[facility] DfemzddUBAZIIFLCQ5917-50-47 07:07:0010.4[facility] 
SycolonCDTGOWKFMT3356-96-72 07:07:00Normal (18 2:07 AM)[facility]
DKWFDXIFSE0284-20-59 07:07:0058.1[facility] MosyhydYBVBHBKTYV3088-54-94 07:07:00
Normal (18 2:07 AM)[facility] McxuwrwFNFYHCEWLA8165-04-29 07:07:0028.5[facility]
TpqpsgfCWNFCNWUZV1839-87-81 16:07:000.1[facility] ShaklfnYLOLIBUXGF6364-58-77 
16:07:00Moderate *ABN*(18 11:07 AM)[facility] VpwugviVYNFSWODER5107-86-10 
06:43:0022.3[facility] HermannCHEM LUHZD9833-40-76 11:45:002.3[facility] HermannCHEM
HUWCM8957-85-54 11:45:003.5[facility] HvyjbbbYXQXKQGZNS5847-55-61 11:45:00





             Test Item    Value        Reference Range Interpretation Comments

 

             PT (test code = PT) 14.0 s       12.0-14.7                 



[facility] EcyaxbeIIGGKMSIGH9992-79-83 11:45:00





             Test Item    Value        Reference Range Interpretation Comments

 

             PTT (test code = PTT) 37.6 s       22.9-35.8                 



[facility] KqkymwmSQQXGOZBPR9916-99-11 11:45:00





             Test Item    Value        Reference Range Interpretation Comments

 

             INR (test code = INR) 1.08 1       0.85-1.17                 



[facility] ZffsehiVWOVWOXXFI2291-76-47 11:45:0013.1[facility] HermannIMMUNOLOGY
2018 11:45:0025.2[facility] HermannCHEM KGTJL6926-28-05 03:06:000.9[facility] 
LfjqlyeOLVTLKSSHJ9887-28-02 03:06:005[facility] ZdgvlxjXVUHMSEOIL3887-64-33 
03:06:0015.8[facility] FzhzxdtNHKJWFOYQS6332-25-83 00:44:00





             Test Item    Value        Reference Range Interpretation Comments

 

             PT (test code = PT) 13.0 s       12.0-14.7                 



[facility] ZnilqrtIMLYFQCYLD3932-50-79 00:44:00





             Test Item    Value        Reference Range Interpretation Comments

 

             INR (test code = INR) 0.98 1       0.85-1.17                 



[facility] NkiavwnRATPKRTOLJ4603-32-08 00:44:00





             Test Item    Value        Reference Range Interpretation Comments

 

             PTT (test code = PTT) 33.2 s       22.9-35.8                 



[facility] HermannBLOOD BANK FBSXQQQ8391-91-03 23:51:00Negative (5/3/18 6:51 PM)
[facility] HermannURINE AND PBOTQ2755-96-96 23:35:000.2[facility] HermannURINE AND 
NNHZV5224-16-98 23:35:00Negative (5/3/18 6:35 PM)[facility]annURINE AND STOOL
2018 23:35:00Negative (5/3/18 6:35 PM)[facility]annURINE AND STOOL
2018 23:35:00Negative *NA*(5/3/18 6:35 PM)[facility]annURINE AND STOOL
2018 23:35:00Negative *NA*(5/3/18 6:35 PM)[facility]annURINE AND STOOL
2018 23:35:00Trace *ABN*(5/3/18 6:35 PM)[facility]annURINE AND STOOL
2018 23:35:00Small *ABN*(5/3/18 6:35 PM)[facility]annURINE AND STOOL
2018 23:35:00





             Test Item    Value        Reference Range Interpretation Comments

 

             UA Spec Grav (test code = UA Spec 1.020 1                          

      



             Grav)                                               



[facility]annURINE AND OVZMS3403-34-33 23:35:00





             Test Item    Value        Reference Range Interpretation Comments

 

             UA pH (test code = UA pH) 6.0 1        5.0-8.0                   



[facility] HermannURINE AND NHGJW5002-15-61 23:35:00Yellow *NA*(5/3/18 6:35 PM)
[facility] HermannURINE AND LAORN0925-06-29 23:35:00Trace *ABN*(5/3/18 6:35 PM)
[facility] HermannURINE AND FFLQZ4057-32-53 23:35:00Slight Cloudy (5/3/18 6:35 PM)
[facility] HermannURINE AND UCPSG7884-34-94 23:35:000-2 (5/3/18 6:35 PM)[facility] 
AcmofoiTBGTOHWXSH1456-71-93 23:20:000.1[facility] IlzjgqhLRGQTJNQWW9646-48-48 
23:20:00Normal (5/3/18 6:20 PM)[facility] AVNDLDJ8082-49-91 16:22:00





             Test Item    Value        Reference Range Interpretation Comments

 

             CULTURE (BEAKER) (test No growth in 5 days                         

  



             code = 1095)                                        



BLOOD IVCICGX7102-67-88 16:22:00





             Test Item    Value        Reference Range Interpretation Comments

 

             CULTURE (BEAKER) (test No growth in 5 days                         

  



             code = 1095)                                        



(MANUAL DIFFERENTIAL)2017 22:29:00





             Test Item    Value        Reference Range Interpretation Comments

 

             TOTAL COUNTED (BEAKER) (test code =                                

        



             1351)                                               

 

             WBC MORPHOLOGY (BEAKER) (test code = Normal                        

         



             487)                                                

 

             PLT MORPHOLOGY (BEAKER) (test code = Normal                        

         



             486)                                                

 

             RBC MORPHOLOGY (BEAKER) (test code = Normal                        

         



             762)                                                



CBC W/PLT COUNT &amp; AUTO XIAAHWPLKAOO3670-21-89 22:28:00





             Test Item    Value        Reference Range Interpretation Comments

 

             WHITE BLOOD CELL COUNT (BEAKER) 7.3 K/ L     4.0-10.0              

    



             (test code = 775)                                        

 

             RED BLOOD CELL COUNT (BEAKER) 5.09 M/ L    4.20-5.80               

  



             (test code = 761)                                        

 

             HEMOGLOBIN (BEAKER) (test code = 13.0 GM/DL   13.0-16.8            

     



             410)                                                

 

             HEMATOCRIT (BEAKER) (test code = 42.3 %       40.0-50.0            

     



             411)                                                

 

             MEAN CORPUSCULAR VOLUME (BEAKER) 83.0 fL      82.0-98.0            

     



             (test code = 753)                                        

 

             MEAN CORPUSCULAR HEMOGLOBIN 25.6 pg      27.0-33.0    L            



             (BEAKER) (test code = 751)                                        

 

             MEAN CORPUSCULAR HEMOGLOBIN CONC 30.8 GM/DL   32.0-36.0    L       

     



             (BEAKER) (test code = 752)                                        

 

             RED CELL DISTRIBUTION WIDTH 18.0 %       10.3-14.2    H            



             (BEAKER) (test code = 412)                                        

 

             PLATELET COUNT (BEAKER) (test 331 K/CU MM  150-430                 

  



             code = 756)                                         

 

             MEAN PLATELET VOLUME (BEAKER) 8.5 fL       6.5-10.5                

  



             (test code = 754)                                        

 

             NUCLEATED RED BLOOD CELLS 0 /100 WBC   0-0                       



             (BEAKER) (test code = 413)                                        

 

             NEUTROPHILS RELATIVE PERCENT 65 %                                  

 



             (BEAKER) (test code = 429)                                        

 

             LYMPHOCYTES RELATIVE PERCENT 23 %                                  

 



             (BEAKER) (test code = 430)                                        

 

             MONOCYTES RELATIVE PERCENT 8 %                                    



             (BEAKER) (test code = 431)                                        

 

             EOSINOPHILS RELATIVE PERCENT 3 %                                   

 



             (BEAKER) (test code = 432)                                        

 

             BASOPHILS RELATIVE PERCENT 1 %                                    



             (BEAKER) (test code = 437)                                        

 

             NEUTROPHILS ABSOLUTE COUNT 4.75 K/ L    1.80-8.00                 



             (BEAKER) (test code = 670)                                        

 

             LYMPHOCYTES ABSOLUTE COUNT 1.68 K/ L    1.48-4.50                 



             (BEAKER) (test code = 414)                                        

 

             MONOCYTES ABSOLUTE COUNT (BEAKER) 0.55 K/ L    0.00-1.30           

      



             (test code = 415)                                        

 

             EOSINOPHILS ABSOLUTE COUNT 0.24 K/ L    0.00-0.50                 



             (BEAKER) (test code = 416)                                        

 

             BASOPHILS ABSOLUTE COUNT (BEAKER) 0.05 K/ L    0.00-0.20           

      



             (test code = 417)                                        



[id number] METABOLIC [id number] 11:11:00





             Test Item    Value        Reference Range Interpretation Comments

 

             SODIUM (BEAKER) 138 meq/L    136-145                   



             (test code = 381)                                        

 

             POTASSIUM (BEAKER) 4.0 meq/L    3.5-5.1                   



             (test code = 379)                                        

 

             CHLORIDE (BEAKER) 108 meq/L           H            



             (test code = 382)                                        

 

             CO2 (BEAKER) (test 23 meq/L     22-29                     



             code = 355)                                         

 

             BLOOD UREA NITROGEN 11 mg/dL     7-21                      



             (BEAKER) (test code                                        



             = 354)                                              

 

             CREATININE (BEAKER) 0.74 mg/dL   0.57-1.25                 



             (test code = 358)                                        

 

             GLUCOSE RANDOM 124 mg/dL           H            



             (BEAKER) (test code                                        



             = 652)                                              

 

             CALCIUM (BEAKER) 8.9 mg/dL    8.4-10.2                  



             (test code = 697)                                        

 

             EGFR (BEAKER) (test 150 mL/min/1.73                           ESTIM

ATED GFR IS



             code = 1092) sq m                                   NOT AS ACCURATE

 AS



                                                                 CREATININE



                                                                 CLEARANCE IN



                                                                 PREDICTING



                                                                 GLOMERULAR



                                                                 FILTRATION RATE

.



                                                                 ESTIMATED GFR I

S



                                                                 NOT APPLICABLE 

FOR



                                                                 DIALYSIS PATIEN

TS.



URINE [id number] 09:56:00





             Test Item    Value        Reference Range Interpretation Comments

 

             CULTURE (BEAKER) (test <10,000 col/mL skin                         

  



             code = 1095) jaime                                  



COMPREHENSIVE METABOLIC [id number] 08:49:00





             Test Item    Value        Reference Range Interpretation Comments

 

             TOTAL PROTEIN 7.3 gm/dL    6.0-8.3                   



             (BEAKER) (test code =                                        



             770)                                                

 

             ALBUMIN (BEAKER) 3.3 g/dL     3.5-5.0      L            



             (test code = 1145)                                        

 

             ALKALINE PHOSPHATASE 89 U/L                           



             (BEAKER) (test code =                                        



             346)                                                

 

             BILIRUBIN TOTAL 1.4 mg/dL    0.2-1.2      H            



             (BEAKER) (test code =                                        



             377)                                                

 

             SODIUM (BEAKER) (test 137 meq/L    136-145                   



             code = 381)                                         

 

             POTASSIUM (BEAKER) 3.7 meq/L    3.5-5.1                   



             (test code = 379)                                        

 

             CHLORIDE (BEAKER) 108 meq/L           H            



             (test code = 382)                                        

 

             CO2 (BEAKER) (test 17 meq/L     22-29        L            



             code = 355)                                         

 

             BLOOD UREA NITROGEN 12 mg/dL     7-21                      



             (BEAKER) (test code =                                        



             354)                                                

 

             CREATININE (BEAKER) 0.78 mg/dL   0.57-1.25                 



             (test code = 358)                                        

 

             GLUCOSE RANDOM 119 mg/dL           H            



             (BEAKER) (test code =                                        



             652)                                                

 

             CALCIUM (BEAKER) 8.6 mg/dL    8.4-10.2                  



             (test code = 697)                                        

 

             AST (SGOT) (BEAKER) 13 U/L       5-34                      



             (test code = 353)                                        

 

             ALT (SGPT) (BEAKER) 28 U/L       6-55                      



             (test code = 347)                                        

 

             EGFR (BEAKER) (test 141                                    ESTIMATE

D GFR IS



             code = 1092) mL/min/1.73 sq                           NOT AS ACCURA

TE AS



                          m                                      CREATININE



                                                                 CLEARANCE IN



                                                                 PREDICTING



                                                                 GLOMERULAR



                                                                 FILTRATION RATE

.



                                                                 ESTIMATED GFR I

S



                                                                 NOT APPLICABLE 

FOR



                                                                 DIALYSIS PATIEN

TS.



CBC W/PLT COUNT &amp; AUTO [id number] 08:46:00





             Test Item    Value        Reference Range Interpretation Comments

 

             WHITE BLOOD CELL COUNT (BEAKER) 9.4 K/ L     4.0-10.0              

    



             (test code = 775)                                        

 

             RED BLOOD CELL COUNT (BEAKER) 4.79 M/ L    4.20-5.80               

  



             (test code = 761)                                        

 

             HEMOGLOBIN (BEAKER) (test code = 12.8 GM/DL   13.0-16.8    L       

     



             410)                                                

 

             HEMATOCRIT (BEAKER) (test code = 40.0 %       40.0-50.0            

     



             411)                                                

 

             MEAN CORPUSCULAR VOLUME (BEAKER) 83.4 fL      82.0-98.0            

     



             (test code = 753)                                        

 

             MEAN CORPUSCULAR HEMOGLOBIN 26.7 pg      27.0-33.0    L            



             (BEAKER) (test code = 751)                                        

 

             MEAN CORPUSCULAR HEMOGLOBIN CONC 32.0 GM/DL   32.0-36.0            

     



             (BEAKER) (test code = 752)                                        

 

             RED CELL DISTRIBUTION WIDTH 18.2 %       10.3-14.2    H            



             (BEAKER) (test code = 412)                                        

 

             PLATELET COUNT (BEAKER) (test 313 K/CU MM  150-430                 

  



             code = 756)                                         

 

             MEAN PLATELET VOLUME (BEAKER) 8.6 fL       6.5-10.5                

  



             (test code = 754)                                        

 

             NUCLEATED RED BLOOD CELLS 0 /100 WBC   0-0                       



             (BEAKER) (test code = 413)                                        

 

             NEUTROPHILS RELATIVE PERCENT 70 %                                  

 



             (BEAKER) (test code = 429)                                        

 

             LYMPHOCYTES RELATIVE PERCENT 16 %                                  

 



             (BEAKER) (test code = 430)                                        

 

             MONOCYTES RELATIVE PERCENT 12 %                                   



             (BEAKER) (test code = 431)                                        

 

             EOSINOPHILS RELATIVE PERCENT 1 %                                   

 



             (BEAKER) (test code = 432)                                        

 

             BASOPHILS RELATIVE PERCENT 1 %                                    



             (BEAKER) (test code = 437)                                        

 

             NEUTROPHILS ABSOLUTE COUNT 6.54 K/ L    1.80-8.00                 



             (BEAKER) (test code = 670)                                        

 

             LYMPHOCYTES ABSOLUTE COUNT 1.50 K/ L    1.48-4.50                 



             (BEAKER) (test code = 414)                                        

 

             MONOCYTES ABSOLUTE COUNT (BEAKER) 1.13 K/ L    0.00-1.30           

      



             (test code = 415)                                        

 

             EOSINOPHILS ABSOLUTE COUNT 0.13 K/ L    0.00-0.50                 



             (BEAKER) (test code = 416)                                        

 

             BASOPHILS ABSOLUTE COUNT (BEAKER) 0.06 K/ L    0.00-0.20           

      



             (test code = 417)                                        



0.00URINALYSIS W/ [id number] 20:36:00





             Test Item    Value        Reference Range Interpretation Comments

 

             COLOR (BEAKER) (test code = 470) Yellow                            

     

 

             CLARITY (BEAKER) (test code = 469) Hazy                            

       

 

             SPECIFIC GRAVITY UA (BEAKER) (test 1.012        1.001-1.035        

       



             code = 468)                                         

 

             PH UA (BEAKER) (test code = 467) 5.5          5.0-8.0              

     

 

             PROTEIN UA (BEAKER) (test code = 100 mg/dL    Negative     A       

     



             464)                                                

 

             GLUCOSE UA (BEAKER) (test code = Negative     Negative             

     



             365)                                                

 

             KETONES UA (BEAKER) (test code = Negative     Negative             

     



             371)                                                

 

             BILIRUBIN UA (BEAKER) (test code = Negative     Negative           

       



             462)                                                

 

             BLOOD UA (BEAKER) (test code = 461) Moderate     Negative     A    

        

 

             NITRITE UA (BEAKER) (test code = Negative     Negative             

     



             465)                                                

 

             LEUKOCYTE ESTERASE UA (BEAKER) Large        Negative     A         

   



             (test code = 466)                                        

 

             UROBILINOGEN UA (BEAKER) (test code 3.0 mg/dL    0.2-1.0      H    

        



             = 463)                                              

 

             RBC UA (BEAKER) (test code = 519) 19 /HPF                          

      

 

             WBC UA (BEAKER) (test code = 520) 182 /HPF                         

      

 

             SOURCE(BEAKER) (test code = 3279)                                  

      



BASIC METABOLIC [id number] 17:00:00





             Test Item    Value        Reference Range Interpretation Comments

 

             SODIUM (BEAKER) 140 meq/L    136-145                   



             (test code = 381)                                        

 

             POTASSIUM (BEAKER) 3.7 meq/L    3.5-5.1                   



             (test code = 379)                                        

 

             CHLORIDE (BEAKER) 112 meq/L           H            



             (test code = 382)                                        

 

             CO2 (BEAKER) (test 17 meq/L     22-29        L            



             code = 355)                                         

 

             BLOOD UREA NITROGEN 23 mg/dL     7-21         H            



             (BEAKER) (test code                                        



             = 354)                                              

 

             CREATININE (BEAKER) 1.00 mg/dL   0.57-1.25                 



             (test code = 358)                                        

 

             GLUCOSE RANDOM 102 mg/dL                        



             (BEAKER) (test code                                        



             = 652)                                              

 

             CALCIUM (BEAKER) 8.7 mg/dL    8.4-10.2                  



             (test code = 697)                                        

 

             EGFR (BEAKER) (test 106 mL/min/1.73                           ESTIM

ATED GFR IS



             code = 1092) sq m                                   NOT AS ACCURATE

 AS



                                                                 CREATININE



                                                                 CLEARANCE IN



                                                                 PREDICTING



                                                                 GLOMERULAR



                                                                 FILTRATION RATE

.



                                                                 ESTIMATED GFR I

S



                                                                 NOT APPLICABLE 

FOR



                                                                 DIALYSIS PATIEN

TS.



Specimen slightly ictericCBC W/PLT COUNT &amp; AUTO [id number] 
12:18:00





             Test Item    Value        Reference Range Interpretation Comments

 

             WHITE BLOOD CELL COUNT (BEAKER) 16.4 K/ L    4.0-10.0     H        

    



             (test code = 775)                                        

 

             RED BLOOD CELL COUNT (BEAKER) 5.22 M/ L    4.20-5.80               

  



             (test code = 761)                                        

 

             HEMOGLOBIN (BEAKER) (test code = 14.4 GM/DL   13.0-16.8            

     



             410)                                                

 

             HEMATOCRIT (BEAKER) (test code = 42.9 %       40.0-50.0            

     



             411)                                                

 

             MEAN CORPUSCULAR VOLUME (BEAKER) 82.2 fL      82.0-98.0            

     



             (test code = 753)                                        

 

             MEAN CORPUSCULAR HEMOGLOBIN 27.5 pg      27.0-33.0                 



             (BEAKER) (test code = 751)                                        

 

             MEAN CORPUSCULAR HEMOGLOBIN CONC 33.5 GM/DL   32.0-36.0            

     



             (BEAKER) (test code = 752)                                        

 

             RED CELL DISTRIBUTION WIDTH 16.2 %       10.3-14.2    H            



             (BEAKER) (test code = 412)                                        

 

             PLATELET COUNT (BEAKER) (test 329 K/CU MM  150-430                 

  



             code = 756)                                         

 

             MEAN PLATELET VOLUME (BEAKER) 8.2 fL       6.5-10.5                

  



             (test code = 754)                                        

 

             NUCLEATED RED BLOOD CELLS 0 /100 WBC   0-0                       



             (BEAKER) (test code = 413)                                        

 

             NEUTROPHILS RELATIVE PERCENT 83 %                                  

 



             (BEAKER) (test code = 429)                                        

 

             LYMPHOCYTES RELATIVE PERCENT 7 %                                   

 



             (BEAKER) (test code = 430)                                        

 

             MONOCYTES RELATIVE PERCENT 9 %                                    



             (BEAKER) (test code = 431)                                        

 

             EOSINOPHILS RELATIVE PERCENT 0 %                                   

 



             (BEAKER) (test code = 432)                                        

 

             BASOPHILS RELATIVE PERCENT 0 %                                    



             (BEAKER) (test code = 437)                                        

 

             NEUTROPHILS ABSOLUTE COUNT 1.62 K/ L    1.80-8.00    L            



             (BEAKER) (test code = 670)                                        

 

             LYMPHOCYTES ABSOLUTE COUNT 1.15 K/ L    1.48-4.50    L            



             (BEAKER) (test code = 414)                                        

 

             MONOCYTES ABSOLUTE COUNT (BEAKER) 1.56 K/ L    0.00-1.30    H      

      



             (test code = 415)                                        

 

             EOSINOPHILS ABSOLUTE COUNT 0.01 K/ L    0.00-0.50                 



             (BEAKER) (test code = 416)                                        

 

             BASOPHILS ABSOLUTE COUNT (BEAKER) 0.02 K/ L    0.00-0.20           

      



             (test code = 417)                                        



(MANUAL DIFFERENTIAL)2017 12:18:00





             Test Item    Value        Reference Range Interpretation Comments

 

             TOTAL COUNTED (BEAKER) (test code =                                

        



             1351)                                               

 

             WBC MORPHOLOGY (BEAKER) (test code = Normal                        

         



             487)                                                

 

             PLT MORPHOLOGY (BEAKER) (test code = Normal                        

         



             486)                                                

 

             RBC MORPHOLOGY (BEAKER) (test code = Normal                        

         



             762)

## 2021-01-15 NOTE — XMS REPORT
Clinical Summary

                           Created on:January 15, 2021



Patient:Redd Dale

Sex:Male

:1985

External Reference #:[id number]





Demographics







                          Address                   [address]



                                                    [place] [address]

 

                          Home Phone                [phone]

 

                          Mobile Phone              [phone]

 

                          Preferred Language        English

 

                          Marital Status            Unknown

 

                          [employer] Affiliation     Unknown

 

                          Race                      Black or 

 

                          Ethnic Group              Not  or 









Author







                          Organization              [facility]

 

                          Address                   [address] [place] [address]









Support







                Name            Relationship    Address         Phone

 

                Sabrina Dale   Unavailable     [address] [phone]

[phone]



                                                [place] [address] 









Care Team Providers







                    Name                Role                Phone

 

                    Sharpless           Primary Care Provider [phone]









Allergies







             Active Allergy Reactions    Severity     Noted Date   Comments

 

             Sulfamethoxazole-Trimethoprim Hives        High         2017 

  

 

             Levofloxacin Hives        High         2017   

 

             Morphine     Hives        High         2017   

 

             Sesame Seed  Hives                     2017   

 

             Ketorolac    Rash         High         2017   

 

             Vancomycin Analogues Rash         High         2017   

 

             Ondansetron Hcl (Pf) Nausea And Vomiting              2017   







Medications







          Medication Sig       Dispensed Refills   Start Date End Date  Status

 

          oxyCODONE-acetami Take 1 tablet by           0                        

     Active



          nophen (PERCOCET) mouth every 4 (four)                                

         



           mg per hours as needed for                                     

    



          tablet    Pain.                                             

 

          buPROPion Take 1 tablet (150 mg 30 tablet 0         2020

021 Active



          (WELLBUTRIN XL) total) by mouth                                       

  



          150 MG 24 hr daily.                                            



          tablet                                                      

 

          citalopram Take 1 tablet (40 mg 30 tablet 11        2020

021 Active



          (CELEXA) 40 MG total) by mouth                                        

 



          tablet    daily.                                            

 

          insulin lispro Inject 0-12 Units 10 mL     0         2020 01/15/

2021 Active



          (HUMALOG) 100 subcutaneously 3                                        

 



          unit/mL injection (three) times daily                                 

        



                    before meals.                                         

 

          insulin lispro Inject 25 Units 10 mL     0         2020 01/15/20

21 Active



          (HUMALOG) 100 subcutaneously 3                                        

 



          unit/mL injection (three) times daily                                 

        



                    before meals.                                         

 

          zinc      Apply 5 g topically 2           0         2020        

   Active



          oxide-petrolatum (two) times daily.                                   

      



          (CRITIC-AID)                                                   



          20-51 % Pste                                                   



          topical paste                                                   

 

          meropenem Inject 1 g           0         2020           Active



          (MERREM) MBP 1 gm intravenously every 8                               

          



          in 100 mL NS (eight) hours.                                         

 

          insulin glargine Inject 58 Units 10 mL     0         2020       

    Active



          (LANTUS) 100 subcutaneously 2                                         



          unit/mL injection (two) times daily Use                               

          



                    as directed.                                         

 

          traZODone Take 1 tablet (100 mg           0         2020 02/15/2

020 



          (DESYREL) 100 MG total) by mouth                                      

   



          tablet    nightly for 30 days.                                        

 







Active Problems







                          Problem                   Noted Date

 

                          Hyperglycemia without ketosis 2020

 

                          Spina bifida              2017

 

                          Pyelonephritis            2017







Encounters







             Date         Type         Specialty    Care Team    Description

 

             2020 - Hospital Encounter General Internal Aashishndani,     Hyper

glycemia without 

ketosis;



                2020                      Medicine        MD Isela



                                        Shunt malfunction, sequela;



                                                    Darby,   Type 2 diabetes

 mellitus with hyperglycemia, unspecified whether 

long term insulin use ([facility]);



                                                                MD Salvador



                                        Diabetes mellitus, new onset ([facility]);



                                                    Rafi, Amer, Type 2 diabete

s mellitus treated with insulin ([facility]);



                                                    MD           Proteus infecti

on;



                                                                 ESBL (extended 

spectrum beta-lactamase) producing bacteria infection;



                                                                 Polymicrobial b

acterial infection;



                                                                 Pyelonephritis;



                                                                 Spina bifida of

 sacral region with hydrocephalus ([facility]);



                                                                 Decubitus ulcer

 of ankle, right, unstageable ([facility]);



                                                                 Pressure ulcer 

of right heel, stage 3 ([facility]);



                                                                 Pressure ulcer 

of left heel, stage 2 ([facility]);



                                                                 Eschar of heel



after 01/15/2020



Social History







             Tobacco Use  Types        Packs/Day    Years Used   Date

 

             Current Every Day Smoker Cigarettes   0.5                       









                                        Tobacco Cessation: Ready to Quit: No









                          Sex Assigned at Birth     Date Recorded

 

                          Not on file               







Last Filed Vital Signs







                Vital Sign      Reading         Time Taken      Comments

 

                Blood Pressure  136/78          2020 12:58 PM CST 

 

                Pulse           96              2020 12:58 PM CST 

 

                Temperature     35.8 C (96.4 F) 2020 12:58 PM CST 

 

                Respiratory Rate 18              2020 12:58 PM CST 

 

                Oxygen Saturation 96%             2020 12:58 PM CST 

 

                Inhaled Oxygen Concentration -               -               

 

                Weight          -               -               

 

                Height          -               -               

 

                Body Mass Index -               -               







Plan of Treatment







                Health Maintenance Due Date        Last Done       Comments

 

                PNEUMOCOCCAL VACCINE 0-64 YRS (1 1991                     

 



                of 1 - PPSV23)                                  

 

                DIABETIC EYE EXAM 1995                      

 

                DIABETIC FOOT EXAM 1995                      

 

                URINE MICROALBUMIN 1995                      

 

                HEMOGLOBIN A1C  04/10/2020      01/10/2020, 2020, 



                                                2020      

 

                INFLUENZA VACCINE (#1) 2020                      

 

                LIPID PANEL     01/10/2023      01/10/2020, 2020, 



                                                2020, Additional history 



                                                exists          







Procedures







             Procedure Name Priority     Date/Time    Associated Diagnosis Comme

nts

 

             RHYTHM STRIP - SCAN              2020  2:11 PM              



                                       CST                       

 

             RHYTHM STRIP - SCAN              2020  3:32 PM              



                                       CST                       

 

             POCT-GLUCOSE METER Routine      2020 12:52 PM              Re

sults for this



                                       CST                       procedure are i

n



                                                                 the results



                                                                 section.

 

             POCT-GLUCOSE METER Routine      2020  9:03 AM              Re

sults for this



                                       CST                       procedure are i

n



                                                                 the results



                                                                 section.

 

             POCT-GLUCOSE METER Routine      01/15/2020  8:45 PM              Re

sults for this



                                       CST                       procedure are i

n



                                                                 the results



                                                                 section.

 

             POCT-GLUCOSE METER Routine      01/15/2020  4:35 PM              Re

sults for this



                                       CST                       procedure are i

n



                                                                 the results



                                                                 section.

 

             POCT-GLUCOSE METER Routine      01/15/2020 12:08 PM              Re

sults for this



                                       CST                       procedure are i

n



                                                                 the results



                                                                 section.

 

             POCT-GLUCOSE METER Routine      01/15/2020  8:35 AM              Re

sults for this



                                       CST                       procedure are i

n



                                                                 the results



                                                                 section.



after 01/15/2020



Results

RHYTHM STRIP - SCAN (2020  2:11 PM CST)Only the most recent of2 results
within the time period is included.





                          Narrative                 Performed At

 

                                        This result has an attachment that is no

t available.



                                        



POC-Glucose meter (2020 12:52 PM CST)Only the most recent of6 results
within the time period is included.





             Component    Value        Ref Range    Performed At Pathologist



                                                                 Signature

 

             POC-Glucose Meter 140 (H)Comment: : 70 - 110 mg/dL [facility] 



                          TESTED AT [address],                            



                          [address]:                                 



                          /[employer]                           



                          an ID = [id number]                           



                          for ANANT DELATORRE                               









                                        Specimen

 

                                        Blood









                Performing Organization Address         City/State/Zipcode Phone

 Number

 

                [facility]  [address]

 [address] [phone]



                CENTER                                          



after 01/15/2020



Additional Health Concerns







                Infection       Onset Date      Last Indicated  Resolved Time

 

                MRSA (C)        2020      







Insurance







          Payer     Benefit Plan Subscriber ID Effective Dates Phone     Address

   Type



                    / Group                                           

 

          MEDICAID - [facility] COMM [id number] 2020-Cortney ARAUJO

edicaid



          MEDICAID [employer] STAR PLAN           [facility]                                                        









           Guarantor Name Account Type Relation to Date of    Phone      Billing

 Address



                                 Patient    Birth                 

 

           Redd Dale Personal/Family Self       1985 [phone]



                                                       (Home)     [address]







                                                                  [place], [place]



                                                                  [address]







Advance Directives

For more information, please contact: [phone]





                Code Status     Date Activated  Date Inactivated Comments

 

                Full Code       2020  9:03 PM 2020  4:44 PM 









                    This code status was determined by: Patient             









                                                                

 

                Full Code       2017  1:36 AM 2017  8:43 PM 









                    This code status was determined by: Patient

## 2021-01-15 NOTE — EDPHYS
Physician Documentation                                                                           

 [facility]                                                                 

Name: Redd Dale Jr                                                                            

Age: 35 yrs                                                                                       

Sex: Male                                                                                         

: 1985                                                                                   

MRN: [id number]                                                                                   

Arrival Date: 01/15/2021                                                                          

Time: 15:18                                                                                       

Account#: [id number]                                                                            

Bed 5                                                                                             

Private MD:                                                                                       

ED Physician Juan Luis Lerner                                                                       

HPI:                                                                                              

01/15                                                                                             

18:52 This 35 yrs old Black Male presents to ER via Wheelchair with complaints of Back Pain \T\ kdr

      Ortega leaking.                                                                              

18:52 The patient presents with pain that is acute, with no known mechanism of injury. The    kdr 

      symptoms are located in the Right flank. Onset: The symptoms/episode began/occurred         

      suddenly, today. The pain does not radiate. Associated signs and symptoms: The patient      

      has no apparent associated signs or symptoms. The problem was sustained from unknown        

      cause. Modifying factors: The patient symptoms are alleviated by remaining still, the       

      patient symptoms are aggravated by movement. Severity of symptoms: At their worst the       

      symptoms were mild, moderate, just prior to arrival, in the emergency department the        

      symptoms are unchanged. The patient has experienced similar episodes in the past, a few     

      times. The patient has been recently seen at the [facility]          

      Emergency Department, yesterday.                                                            

                                                                                                  

Historical:                                                                                       

- Allergies:                                                                                      

15:21 Amoxicillin;                                                                            ll1 

15:21 Bactrim;                                                                                ll1 

15:21 Ciprofloxacin;                                                                          ll1 

15:21 CLAVULANIC ACID;                                                                        ll1 

15:21 Demerol;                                                                                ll1 

15:21 Doxycycline;                                                                            ll1 

15:21 Levofloxacin;                                                                           ll1 

15:21 Morphine;                                                                               ll1 

15:21 PENICILLINS;                                                                            ll1 

15:21 Toradol;                                                                                ll1 

15:21 TRIMETHOPRIM;                                                                           ll1 

15:21 Vancomycin;                                                                             ll1 

15:21 Zofran;                                                                                 ll1 

- PMHx:                                                                                           

15:21 Asthma; Cerebral Palsy; cluster headaches; decubitus ulcers on feet; GERD;              ll1 

      Hydrocephalus; Hypertension; spina bifida;                                                  

- PSHx:                                                                                           

15:21  shunt; Heel Surgery L; Cholecystectomy;                                              ll1 

                                                                                                  

- Immunization history:: Flu vaccine is up to date.                                               

- Social history:: Smoking status: Patient reports the use of cigarette tobacco                   

  products, smokes one-half pack cigarettes per day.                                              

                                                                                                  

                                                                                                  

ROS:                                                                                              

18:52 Constitutional: Negative for fever, chills, and weight loss, Eyes: Negative for injury, kdr 

      pain, redness, and discharge, ENT: Negative for injury, pain, and discharge, Neck:          

      Negative for injury, pain, and swelling, Cardiovascular: Negative for chest pain,           

      palpitations, and edema, Respiratory: Negative for shortness of breath, cough,              

      wheezing, and pleuritic chest pain, Abdomen/GI: Negative for abdominal pain, nausea,        

      vomiting, diarrhea, and constipation, : Negative for injury, bleeding, discharge, and     

      swelling,  He has a suprapubic cath that has been leaking MS/Extremity: Negative for        

      injury and deformity, Skin: Negative for injury, rash, and discoloration, Neuro:            

      Negative for headache, weakness, numbness, tingling, and seizure activity. Psych:           

      Negative for depression, anxiety, suicide ideation, homicidal ideation, and                 

      hallucinations, Allergy/Immunology: Negative for hives, rash, and allergies, Endocrine:     

      Negative for neck swelling, polydipsia, polyuria, polyphagia, and marked weight             

      changes, Hematologic/Lymphatic: Negative for swollen nodes, abnormal bleeding, and          

      unusual bruising.                                                                           

18:52 Back: Positive for pain with movement, of the Right lateral flank.                          

                                                                                                  

Exam:                                                                                             

18:52 Constitutional:  This is a well developed, well nourished patient who is awake, alert,  kdr 

      and in no acute distress. Head/Face:  Normocephalic, atraumatic. Eyes:  Pupils equal        

      round and reactive to light, extra-ocular motions intact.  Lids and lashes normal.          

      Conjunctiva and sclera are non-icteric and not injected.  Cornea within normal limits.      

      Periorbital areas with no swelling, redness, or edema. Neck:  Trachea midline, no           

      thyromegaly or masses palpated, and no cervical lymphadenopathy.  Supple, full range of     

      motion without nuchal rigidity, or vertebral point tenderness.  No Meningismus.             

18:52 Abdomen/GI: There is a suprapubic cath in place that has the smell of urine and appears     

      to be leaking.  No s/s of infection.                                                        

                                                                                                  

Vital Signs:                                                                                      

15:18  / 80; Pulse 95; Resp 18; Temp 98.0; Pulse Ox 92% on R/A; Weight 172.37 kg;       ll1 

      Height 4 ft. 11 in. (149.86 cm); Pain 7/10;                                                 

20:00  / 70; Pulse 90; Resp 16; Pulse Ox 99% ;                                          rr5 

15:18 Body Mass Index 76.75 (172.37 kg, 149.86 cm)                                            ll1 

                                                                                                  

Procedures:                                                                                       

18:52 Replaced suprapubic cath. The cath was replaced without problem. Once instilled, 50 cc  kdr 

      of urine was instilled and would return or leak out of his penis. On initial insertion,     

      there was about 20 cc of urine returned. A post placement Gastrografin study was            

      ordered and the cath appeared to be properly draining the bladder..                         

                                                                                                  

MDM:                                                                                              

18:58 Patient medically screened.                                                             kdr 

19:01 Data reviewed: vital signs, nurses notes, radiologic studies. Counseling: I had a       kdr 

      detailed discussion with the patient and/or guardian regarding: the historical points,      

      exam findings, and any diagnostic results supporting the discharge/admit diagnosis,         

      radiology results, the need for outpatient follow up.                                       

                                                                                                  

01/15                                                                                             

18:51 Order name: Pelvis                                                                      EDMS

                                                                                                  

Administered Medications:                                                                         

17:15 Drug: Dilaudid 1 mg {Note: rass1.} Route: IM; Site: right deltoid;                      sv  

18:28 Follow up: Response: No adverse reaction; No change in condition; RASS: Restless (+1)   sv  

17:15 Drug: Phenergan 25 mg Route: IM; Site: right deltoid;                                   sv  

18:28 Follow up: Response: No adverse reaction                                                sv  

18:24 CANCELLED (change to IM per ): Dilaudid 1 mg IVP once; RASS on ADMIN:         hb  

      Combtv4, Very Agttd3, Agttd2, Rstlss1, AlertClm0, Drwsy-1, Lt Sdtn-2, Mod Sdtn-3, Dp        

      Sdtn-4, UnArsble-5                                                                          

18:28 Drug: Dilaudid 1 mg {Note: rass1.} Route: IM; Site: left deltoid;                       sv  

19:21 Follow up: Response: No adverse reaction                                                mg2 

19:20 Drug: Macrobid 100 mg Route: PO;                                                        mg2 

19:20 Follow up: Response: No adverse reaction; Medication administered at discharge.         mg2 

                                                                                                  

                                                                                                  

Disposition:                                                                                      

01/15/21 18:58 Discharged to Home. Impression: Right lateral flank pain, Suprapubic Ortega         

  replacement.                                                                                    

- Condition is Stable.                                                                            

- Discharge Instructions: Ortega Catheter Care, Adult, Easy-to-Read.                               

- Prescriptions for Macrobid 100 mg Oral Capsule - take 1 capsule by ORAL route every             

  12 hours for 10 days; 20 capsule.                                                               

- Medication Reconciliation Form, Thank You Letter, Antibiotic Education form.                    

- Follow up: Private Physician; When: 2 - 3 days; Reason: If symptoms return, Further             

  diagnostic work-up, Recheck today's complaints, Continuance of care, Re-evaluation by           

  your physician.                                                                                 

- Problem is an acute exacerbation.                                                               

- Symptoms have improved.                                                                         

                                                                                                  

                                                                                                  

                                                                                                  

Signatures:                                                                                       

Dispatcher [employer]                           [facility]                                                 

Judy Deutsch, RN                    RN                                                      

Juan Luis Lerner MD MD   [facility]                                                  

Josefa Bryson RN RN                                                      

Miguel Skaggs RN                    RN   mg2                                                  

Mark Esquivel RN                       RN   ll1                                                  

                                                                                                  

Corrections: (The following items were deleted from the chart)                                    

18:24 18:06 Dilaudid 1 mg IVP once; RASS on ADMIN: Combtv4, Very Agttd3, Agttd2, Rstlss1,     hb  

      AlertClm0, Drwsy-1, Lt Sdtn-2, Mod Sdtn-3, Dp Sdtn-4, UnArsble-5 ordered. kdr               

18:51 18:08 Abdomen 1 View (KUB)+RAD.RAD.BRZ ordered. EDMS                                    EDMS

19:01 18:52 Replaced suprapubic cath. kdr                                                     kdr 

20:04 18:58 01/15/2021 18:58 Discharged to Home. Impression: Right lateral flank pain,        mg2 

      Suprapubic Ortega replacement. Condition is Stable. Forms are Medication Reconciliation      

      Form, Thank You Letter, Antibiotic Education, Prescription Opioid Use. Follow up:           

      Private Physician; When: 2 - 3 days; Reason: If symptoms return, Further diagnostic         

      work-up, Recheck today's complaints, Continuance of care, Re-evaluation by your             

      physician. Problem is an acute exacerbation. Symptoms have improved. kdr                    

                                                                                                  

**************************************************************************************************

## 2021-08-23 ENCOUNTER — HOSPITAL ENCOUNTER (EMERGENCY)
Dept: HOSPITAL 97 - ER | Age: 36
LOS: 1 days | Discharge: HOME | End: 2021-08-24
Payer: COMMERCIAL

## 2021-08-23 DIAGNOSIS — Z88.5: ICD-10-CM

## 2021-08-23 DIAGNOSIS — Z88.8: ICD-10-CM

## 2021-08-23 DIAGNOSIS — K59.00: Primary | ICD-10-CM

## 2021-08-23 DIAGNOSIS — Z88.3: ICD-10-CM

## 2021-08-23 DIAGNOSIS — I10: ICD-10-CM

## 2021-08-23 DIAGNOSIS — Z88.0: ICD-10-CM

## 2021-08-23 LAB
ALBUMIN SERPL BCP-MCNC: 3.1 G/DL (ref 3.4–5)
ALP SERPL-CCNC: 159 U/L (ref 45–117)
ALT SERPL W P-5'-P-CCNC: 144 U/L (ref 12–78)
AST SERPL W P-5'-P-CCNC: 28 U/L (ref 15–37)
BUN BLD-MCNC: 14 MG/DL (ref 7–18)
GLUCOSE SERPLBLD-MCNC: 94 MG/DL (ref 74–106)
HCT VFR BLD CALC: 47.1 % (ref 39.6–49)
LIPASE SERPL-CCNC: 60 U/L (ref 73–393)
LYMPHOCYTES # SPEC AUTO: 1.7 K/UL (ref 0.7–4.9)
PMV BLD: 7.8 FL (ref 7.6–11.3)
POTASSIUM SERPL-SCNC: 3.9 MMOL/L (ref 3.5–5.1)
RBC # BLD: 5.21 M/UL (ref 4.33–5.43)

## 2021-08-23 PROCEDURE — 80048 BASIC METABOLIC PNL TOTAL CA: CPT

## 2021-08-23 PROCEDURE — 36415 COLL VENOUS BLD VENIPUNCTURE: CPT

## 2021-08-23 PROCEDURE — 85025 COMPLETE CBC W/AUTO DIFF WBC: CPT

## 2021-08-23 PROCEDURE — 99283 EMERGENCY DEPT VISIT LOW MDM: CPT

## 2021-08-23 PROCEDURE — 80076 HEPATIC FUNCTION PANEL: CPT

## 2021-08-23 PROCEDURE — 74177 CT ABD & PELVIS W/CONTRAST: CPT

## 2021-08-23 PROCEDURE — 81015 MICROSCOPIC EXAM OF URINE: CPT

## 2021-08-23 PROCEDURE — 83690 ASSAY OF LIPASE: CPT

## 2021-08-23 PROCEDURE — 81003 URINALYSIS AUTO W/O SCOPE: CPT

## 2021-08-23 PROCEDURE — 96374 THER/PROPH/DIAG INJ IV PUSH: CPT

## 2021-08-23 NOTE — ER
Nurse's Notes                                                                                     

 [facility]                                                                 

Name: Redd Dale Jr                                                                            

Age: 35 yrs                                                                                       

Sex: Male                                                                                         

: 1985                                                                                   

MRN: [id number]                                                                                   

Arrival Date: 2021                                                                          

Time: 15:27                                                                                       

Account#: [id number]                                                                            

Bed 30                                                                                            

Private MD:                                                                                       

Diagnosis: Constipation;Abdominal pain, Generalized                                               

                                                                                                  

Presentation:                                                                                     

                                                                                             

16:04 Chief complaint: Pt was brought from the wound care clinic because he was having pain   kg  

      and unable to do wound treatment. Coronavirus screen: Client denies travel out of the       

      [place] in the last 14 days. Client indicates they have traveled out of the [place] in the        

      last 14 days. At this time, unable to obtain information related to travel outside the      

      [place] At this time, the client does not indicate any symptoms associated with                

      coronavirus-19. Ebola Screen: Patient negative for fever greater than or equal to 101.5     

      degrees Fahrenheit, and additional compatible Ebola Virus Disease symptoms Patient          

      denies exposure to infectious person. Patient denies travel to an Ebola-affected area       

      in the 21 days before illness onset. Initial Sepsis Screen: Does the patient meet any 2     

      criteria? No. Patient's initial sepsis screen is negative. Does the patient have a          

      suspected source of infection? No. Patient's initial sepsis screen is negative. Risk        

      Assessment: Do you want to hurt yourself or someone else? Patient reports no desire to      

      harm self or others. Onset of symptoms was 2021.                                 

16:04 Method Of Arrival: Stretcher                                                            kg  

16:04 Acuity: AYESHA 4                                                                           kg  

                                                                                                  

Historical:                                                                                       

- Allergies:                                                                                      

15:41 Zofran;                                                                                 kg  

15:41 Vancomycin;                                                                             kg  

15:41 TRIMETHOPRIM;                                                                           kg  

15:41 Toradol;                                                                                kg  

15:41 PENICILLINS;                                                                            kg  

15:41 Morphine;                                                                               kg  

15:41 Levofloxacin;                                                                           kg  

15:41 Doxycycline;                                                                            kg  

15:41 Demerol;                                                                                kg  

15:41 CLAVULANIC ACID;                                                                        kg  

15:41 Ciprofloxacin;                                                                          kg  

15:41 Bactrim;                                                                                kg  

15:41 Amoxicillin;                                                                            kg  

- PMHx:                                                                                           

15:41 spina bifida; Hypertension; Hydrocephalus; cluster headaches; GERD; decubitus ulcers on kg  

      feet; Cerebral Palsy; Asthma;                                                               

                                                                                                  

                                                                                                  

                                                                                                  

Screening:                                                                                        

15:40 Abuse screen: Denies threats or abuse. Denies injuries from another. Nutritional        kg  

      screening: No deficits noted. Nutritional screening: No deficits noted. Tuberculosis        

      screening: No symptoms or risk factors identified. Fall Risk None identified.               

15:40 Fall Risk.                                                                              kg  

                                                                                                  

Assessment:                                                                                       

23:58 Reassessment: Patient and/or family updated on plan of care and expected duration. Pain ea  

      level reassessed. Patient is alert, oriented x 3, equal unlabored respirations, skin        

      warm/dry/pink. Pt awaiting on EMS for transport back home.                                  

                                                                                             

01:26 Reassessment: Patient is alert, oriented x 3, equal unlabored respirations, skin        ea  

      warm/dry/pink. EMS at facility for transfer report given to EMS. Pt left ED via             

      stretcher per EMS. Pt tolerating well.                                                      

                                                                                                  

Vital Signs:                                                                                      

                                                                                             

17:30  / 94; Pulse 89; Resp 20; Temp 98.7; Pulse Ox 96% ; Pain 10/10;                   kg  

                                                                                                  

ED Course:                                                                                        

15:27 Patient arrived in ED.                                                                  ds1 

15:40 Patient has correct armband on for positive identification.                             kg  

15:40 No provider procedures requiring assistance completed.                                  kg  

16:07 Triage completed.                                                                       kg  

19:18 Hua Lam, RN is Primary Nurse.                                                     jb4 

19:20 Marc Ness MD is Attending Physician.                                            tw4 

19:48 Kerri Diaz, RN is Primary Nurse.                                                    bs2 

21:15 CT Abd/Pelvis - IV Contrast Only Sent.                                                  bs2 

22:13 CT Abd/Pelvis - IV Contrast Only In Process Unspecified.                                [facility]

                                                                                             

01:25 IV discontinued, intact, bleeding controlled, No redness/swelling at site. Pressure     ea  

      dressing applied.                                                                           

                                                                                                  

Administered Medications:                                                                         

                                                                                             

20:00 Drug: fentaNYL (PF) 50 mcg Route: IVP; Site: right forearm;                             bs2 

21:00 Follow up: Response: No adverse reaction; Pain is unchanged, physician notified         bs2 

                                                                                                  

                                                                                                  

Outcome:                                                                                          

22:56 Discharge ordered by MD.                                                                tw4 

                                                                                             

01:25 Discharged to home Pt left via stretcher per EMS.                                       ea  

      Condition: stable                                                                           

      Discharge instructions given to patient, Instructed on discharge instructions, follow       

      up and referral plans. medication usage, Demonstrated understanding of instructions,        

      follow-up care, medications, Prescriptions given X 1.                                       

01:27 Patient left the ED.                                                                    ea  

                                                                                                  

Signatures:                                                                                       

Dispatcher [facility]                                                 

Lilly Sandhu                                ds1                                                  

Hua Lam RN RN   jb4                                                  

Anabelle Jasso RN RN ea Wadley, Terrence, MD MD   tw4                                                  

Lizzeth Elmore, RN                     RN   kg                                                   

Kerri Diaz, RN                      RN   bs2                                                  

                                                                                                  

**************************************************************************************************

## 2021-08-23 NOTE — XMS REPORT
Continuity of Care Document

                           Created on:2021



Patient:JORDAN VERDIN JR

Sex:Male

:1985

External Reference #:[id number]





Demographics







                          Address                   [address]



                                                    [place] [address]

 

                          Home Phone                [phone]

 

                          Work Phone                [phone]

 

                          Mobile Phone              [phone]

 

                          Email Address             DECLINED

 

                          Preferred Language        en

 

                          Marital Status            Unknown

 

                          Mandaeism Affiliation     Unknown

 

                          Race                      Unknown

 

                          Additional Race(s)        Unavailable



                                                    Black or 

 

                          Ethnic Group              Unknown









Author







                          Organization              [facility]

t

 

                          Address                   [address]



                                                    [place] [address]

 

                          Phone                     [phone]









Support







                Name            Relationship    Address         Phone

 

                Chito          Unavailable     [phone]



                                                [place] [address] 

 

                Chito          Unavailable     Unavailable     [phone]

 

                Chito          Mother          [address] [phone]



                                                [place] [address] 

 

                one else per patient Unavailable     Unavailable     Unavailable

 

                Chito          Mother          [address] [phone]

[phone]



                                                [place] [address] 









Care Team Providers







                    Name                Role                Phone

 

                    Sharpless           Primary Care Physician [phone]

 

                    Daisy CLARKP,  F    Attending Clinician [phone]

 

                    SILVESTRE             Attending Clinician Unavailable

 

                    GAYLE Linares       Attending Clinician [phone]

 

                    Jamie Reinoso         Attending Clinician [phone]

 

                    RALPH TORRES         Attending Clinician Unavailable

 

                    RANDALL              Admitting Clinician Unavailable

 

                    Saw Ríos      Admitting Clinician [phone]

 

                    Jamie Reinoso         Admitting Clinician [phone]

 

                    RALPH TORRES         Admitting Clinician Unavailable









Problems







       Condition Condition Condition Status Onset  Resolution Last   Treating Co

mments 

Source



       Name   Details Category        Date   Date   Treatment Clinician        



                                                 Date                 

 

       Hyperglyce Hyperglyce Disease Active                              [facility]

[facility]



       jarvis    jarvis                                                 [facility]



       without without               00:00:                             [facility]



       ketosis ketosis                                                Center

 

       HEADACHE        Diagnosis Active 2018               GAYLE walker



                                             22:05:00               l



                HEADACHE               00:00:                             [facility]

n



                                   00                                 



                                                                      



               Active                                                  



                                                                      



                                                                      



              2018                                                  



                                                                      



                                                                      



                                                                      



                                                                      



                                                                      



                                                                      



                                                                      



                                                                      



                     [facility]                                                  



                                                                      



                                                                      

 

       SHUNT         Diagnosis Active 2018               Patience FIGUEREDO                                20:00:00               l



       N        SHUNT               00:00:                             [place]



              MALFUNCTIO               00                                 



              N                                                       



                                                                      



                 Active                                                  



                                                                      



                                                                      



              2018                                                  



                                                                      



                                                                      



                                                                      



                                                                      



                                                                      



                                                                      



                                                                      



                                                                      



                     [facility]                                                  



                                                                      



                                                                      

 

       ACUTE         Diagnosis Active 2018               [facility]

oria



       HEADACHE                      03          09:20:00               l



                ACUTE               00:00:                             Jose



              HEADACHE               00                                 



                                                                      



                                                                      



              Active                                                  



                                                                      



                                                                      



              2018                                                  



                                                                      



                                                                      



                                                                      



                                                                      



                                                                      



                                                                      



                                                                      



                                                                      



                     [facility]                                                  



                                                                      



                                                                      

 

       Spina  Spina  Disease Active                              [facility]



       bifida bifida                                              [facility]



                                   00:00:                             [facility]



                                   00                                 [facility]

 

       Pyelonephr Pyelonephr Disease Active                              [facility]

[facility]



       itis   itis                                                [facility]



                                   00:00:                             [facility]



                                   00                                 [facility]

 

       Spina         Problem                      2019               [facility]

ia



       bifida,                                    14:14:02               l



       unspecifie   Spina                                                  [place]

nn



       d      bifida,                                                  



              unspecifie                                                  



              d                                                       



                                                                      



                                                                      



                                                                      



                                                                      



                                                                      



                                                                      



                                                                      



                                                                      



              2019                                                  



                                                                      



                                                                      



                                                                      



                                                                      



                 [facility]                                                  



                                                                      



                                                                      

 

       Nausea        Problem                      2019               [facility]

ia



       with                                      14:14:02               l



       vomiting,   Nausea                                                  [place]

nn



       unspecifie with                                                    



       d      vomiting,                                                  



              unspecifie                                                  



              d                                                       



                                                                      



                                                                      



                                                                      



                                                                      



                                                                      



                                                                      



                                                                      



                                                                      



              2019                                                  



                                                                      



                                                                      



                                                                      



                                                                      



                 [facility]                                                  



                                                                      



                                                                      

 

       Diplopia        Problem                      2019               Mem

oria



                                                 14:14:02               l



                Diplopia                                                  [facility]

n



                                                                      



                                                                      



                                                                      



                                                                      



                                                                      



                                                                      



                                                                      



                                                                      



                                                                      



              2019                                                  



                                                                      



                                                                      



                                                                      



                                                                      



                 [facility]                                                  



                                                                      



                                                                      

 

       Cerebral        Problem                      2019               Mem

oria



       palsy,                                    14:14:02               l



       unspecifie   Cerebral                                                  He

rmann



       d      palsy,                                                  



              unspecifie                                                  



              d                                                       



                                                                      



                                                                      



                                                                      



                                                                      



                                                                      



                                                                      



                                                                      



                                                                      



              2019                                                  



                                                                      



                                                                      



                                                                      



                                                                      



                 [facility]                                                  



                                                                      



                                                                      

 

       Acquired        Problem                      2019               Mem

oria



       absence of                                    14:14:02               l



       other    Acquired                                                  [facility]uel

n



       specified absence of                                                  



       parts of other                                                   



       digestive specified                                                  



       tract  parts of                                                  



              digestive                                                  



              tract                                                   



                                                                      



                                                                      



                                                                      



                                                                      



                                                                      



                                                                      



                                                                      



                                                                      



                                                                      



              2019                                                  



                                                                      



                                                                      



                                                                      



                                                                      



                 [facility]                                                  



                                                                      



                                                                      

 

       Nicotine        Problem                      2019               [facility]

oria



       dependence                                    14:14:02               l



       ,        Nicotine                                                  [facility]ig[facility]

n



       cigarettes dependence                                                  



       ,      ,                                                       



       uncomplica cigarettes                                                  



       manjeet    ,                                                       



              uncomplica                                                  



              manjeet                                                     



                                                                      



                                                                      



                                                                      



                                                                      



                                                                      



                                                                      



                                                                      



                                                                      



              2019                                                  



                                                                      



                                                                      



                                                                      



                                                                      



                 [facility]                                                  



                                                                      



                                                                      

 

       Presence        Problem                      2019               Mem

oria



       of                                        14:14:02               l



       cerebrospi   Presence                                                  He

rmann



       nal fluid of                                                      



       drainage cerebrospi                                                  



       device nal fluid                                                  



              drainage                                                  



              device                                                  



                                                                      



                                                                      



                                                                      



                                                                      



                                                                      



                                                                      



                                                                      



                                                                      



                                                                      



              2019                                                  



                                                                      



                                                                      



                                                                      



                                                                      



                 [facility]                                                  



                                                                      



                                                                      

 

       Allergy        Problem                      2019               [facility]

rajeev



       status to                                    14:14:02               l



       other    Allergy                                                  Jose



       antibiotic status to                                                  



       agents other                                                   



       status antibiotic                                                  



              agents                                                  



              status                                                  



                                                                      



                                                                      



                                                                      



                                                                      



                                                                      



                                                                      



                                                                      



                                                                      



                                                                      



              2019                                                  



                                                                      



                                                                      



                                                                      



                                                                      



                 [facility]                                                  



                                                                      



                                                                      

 

       Allergy        Problem                      2019               [facility]

rajeev



       status to                                    14:14:02               l



       other    [facility]ergy                                                  [place]



       drugs, status to                                                  



       medicament other                                                   



       s and  drugs,                                                  



       biological medicament                                                  



       substances s and                                                   



       status biological                                                  



              substances                                                  



              status                                                  



                                                                      



                                                                      



                                                                      



                                                                      



                                                                      



                                                                      



                                                                      



                                                                      



                                                                      



              2019                                                  



                                                                      



                                                                      



                                                                      



                                                                      



                 [facility]                                                  



                                                                      



                                                                      

 

       Allergy        Problem                      2019               [facility]

rajeev



       status to                                    14:14:02               l



       narcotic   Allergy                                                  [place]

nn



       agent  status to                                                  



       status narcotic                                                  



              agent                                                   



              status                                                  



                                                                      



                                                                      



                                                                      



                                                                      



                                                                      



                                                                      



                                                                      



                                                                      



                                                                      



              2019                                                  



                                                                      



                                                                      



                                                                      



                                                                      



                 [facility]                                                  



                                                                      



                                                                      

 

       Asthma        Problem Resolve               2019               [facility]

rajeev



       (disorder)               d                    01:05:55               l



                Asthma                                                  Jose



              (disorder)                                                  



                                                                      



                                                                      



               Resolved                                                  



                                                                      



                                                                      



                                                                      



                                                                      



                Problem                                                  



                                                                      



                                                                      



              2019                                                  



                                                                      



                                                                      



                                                                      



                                                                      



                 [facility]                                                  



                                                                      



                                                                      

 

       Bronchitis        Problem Resolve               2019               

[facility]



       (disorder)               d                    01:05:55               l



                                                                      Jose



              Bronchitis                                                  



              (disorder)                                                  



                                                                      



                                                                      



               Resolved                                                  



                                                                      



                                                                      



                                                                      



                                                                      



                Problem                                                  



                                                                      



                                                                      



              2019                                                  



                                                                      



                                                                      



                                                                      



                                                                      



                 [facility]                                                  



                                                                      



                                                                      

 

       Cerebral        Problem Resolve               2019               Me

moria



       palsy                d                    01:05:55               l



       (disorder)   Cerebral                                                  He

rmann



              palsy                                                   



              (disorder)                                                  



                                                                      



                                                                      



               Resolved                                                  



                                                                      



                                                                      



                                                                      



                                                                      



                Problem                                                  



                                                                      



                                                                      



              2019                                                  



                                                                      



                                                                      



                                                                      



                                                                      



                 [facility]                                                  



                                                                      



                                                                      

 

       Hydrocepha        Problem Resolve               2019               

[facility]



       ozzy                  d                    01:05:55               l



       (disorder)                                                         Miguel

n



              Hydrocepha                                                  



              ozzy                                                     



              (disorder)                                                  



                                                                      



                                                                      



               Resolved                                                  



                                                                      



                                                                      



                                                                      



                                                                      



                Problem                                                  



                                                                      



                                                                      



              2019                                                  



                                                                      



                                                                      



                                                                      



                                                                      



                 [facility]                                                  



                                                                      



                                                                      

 

       Osteomyeli        Problem Resolve               2019               

[facility]



       tis                  d                    01:05:55               l



       (disorder)                                                         Miguel

n



              Osteomyeli                                                  



              tis                                                     



              (disorder)                                                  



                                                                      



                                                                      



               Resolved                                                  



                                                                      



                                                                      



                                                                      



                                                                      



                Problem                                                  



                                                                      



                                                                      



              2019                                                  



                                                                      



                                                                      



                                                                      



                                                                      



                 [facility]                                                  



                                                                      



                                                                      

 

       Acute pain        Problem Active               2019               M

emoria



       (finding)                                    01:05:55               l



                Acute                                                  [place]



              pain                                                    



              (finding)                                                  



                                                                      



                                                                      



              Active                                                  



                                                                      



                                                                      



                                                                      



                                                                      



              Problem                                                  



                                                                      



                                                                      



              2019                                                  



                                                                      



                                                                      



                                                                      



                                                                      



                 [facility]                                                  



                                                                      



                                                                      

 

       Headache        Problem Active               2019               Mem

oria



       (finding)                                    01:05:55               l



                Headache                                                  Miguel

n



              (finding)                                                  



                                                                      



                                                                      



              Active                                                  



                                                                      



                                                                      



                                                                      



                                                                      



              Problem                                                  



                                                                      



                                                                      



              2019                                                  



                                                                      



                                                                      



                                                                      



                                                                      



                 [facility]                                                  



                                                                      



                                                                      







History of Past Illness







       Condition Condition Condition Status Onset  Resolution Last   Treating Co

mments 

Source



       Name   Details Category        Date   Date   Treatment Clinician        



                                                 Date                 

 

       Headache        Problem        -2019              

 Memoria



                                   -08   14:14:02 14:14:02               l



                Headache               06:00:                             Miguel

n



                                   00                                 



                                                                      



                                                                      



                                                                      



                                                                      



              2018                                                  



                                                                      



                                                                      



                                                                      



                                                                      



                 [facility]                                                  



                                                                      



                                                                      







Allergies, Adverse Reactions, Alerts







       Allergy Allergy Status Severity Reaction(s) Onset  Inactive Treating Comm

ents 

Source



       Name   Type                        Date   Date   Clinician        

 

       Sulfamet Propensi Active        Hives  -                      [facility]



       hoxazole ty to                       [facility]



       [facility] adverse                      00:00:                      Medical



       oprim  reaction                      00                          Center



              s                                                       

 

       Levoflox Propensi Active        Hives                        [facility]



       acin   ty to                                               [facility]



              adverse                      00:00:                      Medical



              reaction                      00                          Center



              s                                                       

 

       Morphine Propensi Active        Hives                        [facility]



              ty to                                               [facility]



              adverse                      00:00:                      Medical



              reaction                      00                          Center



              s                                                       

 

       Sesame Propensi Active        Hives                        [facility]



       Seed   ty to                                               [facility]



              adverse                      00:00:                      Medical



              reaction                      00                          Center



              s                                                       

 

       Ketorola Propensi Active        Rash                         [facility]



       c      ty to                                               [facility]



              adverse                      00:00:                      Medical



              reaction                      00                          Center



              s                                                       

 

       Vancomyc Propensi Active        Rash                         [facility]



       in     ty to                                               [facility]



       Analogue adverse                      00:00:                      Medical



       s      reaction                      00                          Center



              s                                                       

 

       Ondanset Propensi Active        Nausea And                       [facility]

[facility]



       jersey [facility]cl ty to                Vomiting                         [facility]



       (Pf)   adverse                      00:00:                      Medical



              reaction                      00                          Center



              s                                                       

 

       Toradol Toradol Active                                           [facility]



                                                                      l



                                                                      Jose

 

       Minocin Minocin Active                                           [facility]



                                                                      l



                                                                      [place]

 

       Zofran Zofran Active                                           [facility]



                                                                      l



                                                                      [place]

 

       Levaquin Levaquin Active                                           [facility]

a



                                                                      l



                                                                      Jose

 

       Bactrim Bactrim Active                                           [facility]



                                                                      l



                                                                      Jose

 

       amoxicil amoxicil Active                                           [facility]

a



       bryn    bryn                                                     l



                                                                      Jose

 

       morphine morphine Active                                           [facility]

a



                                                                      l



                                                                      [place]

 

       Morphine Adverse Active        Info Not                             [facility]

t



       Sulfate Reaction               Available                             [facility]

s -



       [facility]                                                             [facility]



                                                                      [facility]



                                                                      [facility]



                                                                      [facility]



                                                                      Clinics

 

       Levaquin Adverse Active        Info Not                             [facility]

t



              Reaction               Available                             [facility]

 -



                                                                      [facility]



                                                                      [facility]



                                                                      [facility]



                                                                      [facility]

 

       Zofran Adverse Active        Info Not                             [facility]



              Reaction               Available                             [facility]

 -



                                                                      [facility]



                                                                      [facility]



                                                                      [facility]



                                                                      [facility]



                                                                      Clinics







Social History







           Social Habit Start Date Stop Date  Quantity   Comments   Source

 

           History of tobacco                       Cigarette Smoker            

[facility] -



           use                                                    [facility]

 

           Sex Assigned At                                             [facility] -



           Birth                                                  [facility]

 

           Cigarettes smoked 2017                       [facility] -



           current (pack per 00:00:00   00:00:00                         Medical

 Center



           day) - Reported                                             









                Smoking Status  Start Date      Stop Date       Source

 

                Social History  2018 11:26:10                 [facility]

[facility]







Medications







       Ordered Filled Start  Stop   Current Ordering Indication Dosage Frequency

 Signature

                    Comments            Components          Source



     Medication Medication Date Date Medication? Clinician                (SIG) 

          



     Name Name                                                   

 

     buPROPion      2021- No             150mg QD   Take 1           [facility]



     (WELLBUTRIN      1-17 -16                          tablet           [facility]

 -



     XL) 150 MG      00:00: 23:59                          (150 mg           Med

ical



     24 hr      00   :00                           total) by           Center



     tablet                                         mouth           



                                                  daily.           

 

     citalopram      2021- No             40mg QD   Take 1           [facility]



     (CELEXA) 40      -17 -16                          tablet (40           [facility]

[facility] -



     MG tablet      00:00: 23:59                          mg total)           Me

dical



               00   :00                           by mouth           Center



                                                  daily.           

 

     oxyCODONE-a            Yes            1{tbl}      Take 1           [facility]



     cetaminophe      1-16                               tablet by           Ni

es -



     n         14:44:                               mouth           Medical



     (PERCOCET)      50                                 every 4           Center



      mg                                         (four)           



     per tablet                                         hours as           



                                                  needed for           



                                                  Pain.           

 

     zinc            Yes            5g   Q.5D Apply 5 g           [facility]



     oxide-yuan      1-16                               topically           [facility]

es -



     latum      00:00:                               2 (two)           Medical



     (CRITIC-AID      00                                 times           Center



     ) 20-51 %                                         daily.           



     Pste                                                        



     topical                                                        



     paste                                                        

 

     meropenem            Yes            1g        Inject 1 g           [facility]



     (MERREM)      1-16                               intravenou           [facility]

 -



     MBP 1 gm in      00:00:                               sly every           M

edical



     100 mL NS      00                                 8 (eight)           [facility]

r



                                                  hours.           

 

     insulin            Yes            58U  Q.5D Inject 58           [facility]

[facility]



     glargine      1-16                               Units           [facility]



     (LANTUS)      00:00:                               subcutaneo           Med

ical



     100 unit/mL      00                                 usly 2           [facility]



     injection                                         (two)           



                                                  times           



                                                  daily Use           



                                                  as             



                                                  directed.           

 

     insulin      2021- No             0U        Inject           [facility]



     lispro      1-16 01-15                          0-12 Units           [facility] 

-



     (HUMALOG)      00:00: 23:59                          subcutaneo           M

edical



     100 unit/mL      00   :00                           usly 3           [facility]



     injection                                         (three)           



                                                  times           



                                                  daily           



                                                  before           



                                                  meals.           

 

     insulin      2021- No             25U       Inject 25           [facility]



     lispro      1-16 01-15                          Units           [facility]



     (HUMALOG)      00:00: 23:59                          subcutaneo           M

edical



     100 unit/mL      00   :00                           usly 3           Center



     injection                                         (three)           



                                                  times           



                                                  daily           



                                                  before           



                                                  meals.           

 

     normal      2018      No                       1,000 mL,           [facility]

a



     saline 0.9%      1-08                               Rate: 100           l



     IV 1,000 mL      11:04:                               ml/hr,           [facility]

                                 Infuse           



                                                  over: 10           



                                                  hr, Route:           



                                                  IV, Dosing           



                                                  Weight           



                                                  131.818           



                                                  kg, Total           



                                                  Volume:           



                                                  1,000,           



                                                  Start           



                                                  date:           



                                                  18           



                                                  5:04:00           



                                                  CST,           



                                                  Duration:           



                                                  30 day,           



                                                  Stop date:           



                                                  18           



                                                  5:03:00           



                                                  CST, 2.4,           



                                                  m2             

 

     Magnesium      2018      No                       Notes:           [facility]

a



     Sulfate                                     WASTE: F/P           l



               10:36:                               - Sink; E           [place]



                                                -              



                                                  Municipal           



                                                  Trash Bin           

 

     Isolyte S      2018      No                       Notes:           [facility]

a



     PH-7.4                                     (Same as:           l



     (Bolus) IV      10:36:                               Isolyte S           [facility]

rmann



                                                PH 7.4)           

 

     Phenergan      2018      No                       12.5 mg,           [facility]

[facility]



                                              0.5 mL,           l



               10:35:                               Route:                                            IVPB, Drug           



                                                  form: INJ,           



                                                  ONCE,           



                                                  Dosing           



                                                  Weight           



                                                  131.818,           



                                                  kg,            



                                                  Priority:           



                                                  STAT,           



                                                  Start           



                                                  date:           



                                                  18           



                                                  4:35:00           



                                                  CST, Stop           



                                                  date:           



                                                  18           



                                                  4:35:00           



                                                  CST            

 

     Citalopram Citalopram       Yes  [place]                1 tablet     

      [facility]



     Hydrobromid Hydrobromid 7-16                                              [facility]

[facility] -



     e    e    00:00:                                              [facility]



               00                                                l



                                                                 [facility]



                                                                 ent



                                                                 [facility]

 

     Seroquel Seroquel       Yes  [place]                1 tablet         

  [facility]



               7-16                                              [facility]



               00:00:                                              [facility]



               00                                                l



                                                                 [facility]



                                                                 ent



                                                                 [facility]

 

     Docusate            Yes                      100 mg = 1           [facility]

oria



     Sodium 100      5-08                               cap, PO,           l



     MG Oral      14:56:                               BID, 0           [facility]



     Capsule      00                                 Refill(s)           

 

     Zosyn            Yes                      0              [facility]



               5-08                               Refill(s)           l



               14:56:                                              [facility]



               00                                                

 

     celecoxib            Yes                      200 mg = 1           [facility]

[facility]



     200 mg oral      5-08                               cap, PO,           l



     capsule      14:56:                               BID, 0           [place]



               00                                 Refill(s)           

 

     ascorbic      0      Yes                      500 mg = 1           [facility]

[facility]



     acid      5-08                               tab, PO,           l



               14:56:                               BID, 0           [facility]



               00                                 Refill(s)           

 

     acetaminoph            Yes                      1,000 mg =           

[facility]



     en 500 mg      5-08                               2 tab, PO,           l



     oral tablet      14:56:                               Q6Hnow, 0           H

[facility]



               00                                 Refill(s)           

 

     Oxycodone            Yes                      5 mg = 1           [facility]

[facility]



     Hydrochlori      5-08                               tab, PO,           l



     de 5 MG      14:56:                               Q4H, PRN           [facility]

n



     Oral Tablet      00                                 Pain Score           



                                                  4-6, 0           



                                                  Refill(s)           

 

     zinc            Yes                      220 mg = 1           [facility]



     sulfate 220      5-08                               cap, PO,           l



     mg oral      14:56:                               Daily, 0           [facility]

n



     capsule      00                                 Refill(s)           

 

     multivitami            Yes                      1 tab, PO,           

[facility]



     n         5-08                               Daily, 0           l



               14:56:                               Refill(s)           [place]



               00                                                

 

     methocarbam            Yes                      1,000 mg =           

[facility]



     ol 500 mg      5-08                               2 tab, PO,           l



     oral tablet      14:56:                               Q8H, 0           [facility]

[facility]



               00                                 Refill(s)           

 

     LORazepam            Yes                      0.5 mg = 1           Me

[facility]



     0.5 mg oral      5-08                               tab, PO,           l



     tablet      14:56:                               Q8H, PRN           [place]



               00                                 Anxiety, 0           



                                                  Refill(s)           

 

     Lidocaine            Yes                      3 patch,           [facility]

[facility]



     Hydrochlori      5-08                               TOP,           l



     de 0.05      14:56:                               Daily,           [facility]



     MG/MG      00                                 Remove           



     Transdermal                                         after 12           



     Patch                                         hours, 0           



     [Lidoderm]                                         Refill(s)           

 

     Robaxin            No                       Notes:           [facility]



               5-06                               (Same           l



               21:00:                               as:Robaxin           [place]



               00                                 )              

 

     Oxycodone            No                       Notes:           [facility]

a



     Hydrochlori      5-06                               (Same as:           l



     de 5 MG      18:06:                               Roxicodone           [facility]

[facility]



     Oral Tablet      00                                 )              

 

     Ativan            No                       Notes:           [facility]



               5-06                               (Same as:           l



               15:18:                               Ativan)           [facility]



               00                                                

 

     Trazodone            No                       Notes:           [facility]

a



     Hydrochlori      5-06                               (Same As:           l



     de 50 MG      02:00:                               Desyrel)           [facility]

nn



     Oral Tablet      00                                                

 

     remove            No                       Notes:           [facility]



     patch      5-06                               Remove           l



               02:00:                               patch 12           [place]



               00                                 hours           



                                                  after           



                                                  applicatio           



                                                  n each           



                                                  day.           

 

     Oxycodone            No                       Notes:           [facility]

a



     Hydrochlori      5-05                               (Same as:           l



     de 5 MG      22:01:                               Roxicodone           [facility]

[facility]



     Oral Tablet      00                                 )              

 

     Celebrex            No                       Notes:           [facility]



               5-05                               NSAID.           l



               22:00:                               Please           [facility]



               00                                 check           



                                                  indication           



                                                  . Not for           



                                                  seizure.           



                                                  (Same As:           



                                                  CeleBREX)           

 

     Vancomycin            No                        2001 mg:           Me

[facility]



               5-05                               infuse           l



               21:00:                               over 2.5           [place]



               00                                 hours           

 

     Lidocaine            No                       Notes:           [facility]

a



     Hydrochlori      5-05                               Apply only           l



     de 0.05      14:00:                               once for           [facility]

n



     MG/MG      00                                 up to 12           



     Transdermal                                         hours in a           



     Patch                                         24-hour           



     [Lidoderm]                                         period (12           



                                                  hours on           



                                                  and 12           



                                                  hours           



                                                  off).           



                                                  (Same as:           



                                                  Lidoderm)           



                                                  "Remove           



                                                  old patch           



                                                  before           



                                                  applicatio           



                                                  n of new           



                                                  patch"           

 

     Phenergan            No                       Notes: Do           Mem

oria



               5-04                               not give           l



               22:40:                               IV push.           [facility]



                                                (Same as:           



                                                  Phenergan)           

 

     Dilaudid            No                       Notes:           [facility]



               5-04                               Same as           l



               22:40:                               Dilaudid           [facility]



               00                                                

 

     Tramadol            No                       Notes: Not           Mem

oria



               5-04                               to exceed           l



               22:00:                               400mg/day.           [facility]



               00                                 (Same As:           



                                                  Ultram)           

 

     gabapentin            No                       Notes:           [facility]

ia



               5-04                               (Same as:           l



               22:00:                               Neurontin)           [place]



               00                                                

 

     Acetaminoph            No                       Notes: Max           

[facility]



     en        5-04                               acetaminop           l



               22:00:                               hen 4000           [place]



               00                                 mg/day (4           



                                                  gm/day).           



                                                  (Same as:           



                                                  Tylenol           



                                                  Extra           



                                                  Strength)           

 

     Robaxin            No                       Notes:           [facility]



               5-04                               (Same           l



               22:00:                               as:Robaxin           [facility]



               00                                 )              

 

     Oxycodone            No                       Notes:           [facility]

a



     Hydrochlori      5-04                               (Same as:           l



     de 5 MG      21:33:                               Roxicodone           [facility]

[facility]



     Oral Tablet      00                                 )              

 

     Beneprotein            No                       Notes:           [facility]

[facility]



     7 gm pkt      5-04                               (Same as:           l



               21:30:                               Beneprotei           [place]



               00                                 n)             

 

     Acetaminoph            No                       1 tab, PO,           

[facility]



     en 325 MG /      5-04                               TID, 0           l



     Oxycodone      17:12:                               Refill(s)           [facility]

child



     Hydrochlori      00                                                



     de 10 MG                                                        



     Oral Tablet                                                        



     [Percocet                                                        



     10/325]                                                        

 

     Dilaudid            No                       0.5 mg,           [facility]

a



               5-04                               0.25 mL,           l



               16:01:                               Route:                                            IVP, Drug           



                                                  form: INJ,           



                                                  ONCE,           



                                                  Start           



                                                  date:           



                                                  18           



                                                  11:01:00           



                                                  CDT, Stop           



                                                  date:           



                                                  18           



                                                  11:01:00           



                                                  CDT            

 

     Phenergan            No                       Notes:           [facility]

a



               5-04                               (Same as:           l



               15:43:                               Phenergan)                                                           

 

     Dilaudid            No                       2 mg,           [facility]



               5-04                               Route:           l



               15:43:                               IVP, ONCE,                                            Dosing           



                                                  Weight           



                                                  127.027,           



                                                  kg,            



                                                  Priority:           



                                                  STAT,           



                                                  Start           



                                                  date:           



                                                  18           



                                                  10:43:00           



                                                  CDT, Stop           



                                                  date:           



                                                  18           



                                                  10:43:00           



                                                  CDT            

 

     Docusate            No                       Notes:           [facility]



               5-04                               (Same as:           l



               14:00:                               Colace)                                            (Do Not           



                                                  Crush)           

 

     Zinc            No                       Notes:           [facility]



     Sulfate                                     (Zinc           l



               14:00:                               sulfate                                            capsule) -           



                                                  220 mg           



                                                  Zinc           



                                                  sulfate =           



                                                  50 mg           



                                                  elemental           



                                                  zinc  Same           



                                                  as Zinc           



                                                  Sulfate           

 

     ascorbic            No                       Notes:           [facility]



     acid      -                               (Same as:           l



               14:00:                               Vitamin C)                                                           

 

     multivitami            No                       Notes:           [facility]

[facility]



     n         -                               (Same           l



               14:00:                               as:Thera)                                            WASTE: F/P           



                                                  - Black; E           



                                                  -              



                                                  Municipal           



                                                  Trash Bin           



                                                  Take with           



                                                  food.           

 

     Naproxen            No                       Notes:           [facility]



               5-04                               (Same as:           l



               07:00:                               Naprosyn)                                            Take with           



                                                  food.           

 

     Zosyn            No                       Notes:           [facility]



               5-04                               (Same as:           l



               07:00:                               Zosyn)                                            Dosing           



                                                  based on           



                                                  [place]ac[place]           



                                                  in             



                                                  component           



                                                   ***           



                                                  MEDICATION           



                                                  WASTE ***           



                                                  Product           



                                                  Size:           



                                                  3375 mg           



                                                  Product           



                                                  Wasted:           



                                                  ___ mg           

 

     Vancomycin            No                        2001 mg:           Me

[facility]



               5-04                               infuse           l



               07:00:                               over 2.5                                            hours  For           



                                                  adult           



                                                  patients           



                                                  only:           



                                                  Round to           



                                                  nearest           



                                                  250 mg per           



                                                  Medical           



                                                  Staff           



                                                  approval           



                                                  ***            



                                                  MEDICATION           



                                                  WASTE ***           



                                                  Product           



                                                  Size:           



                                                  1000 mg           



                                                  Product           



                                                  Wasted:           



                                                  ___ mg           

 

     Enoxaparin            No                       Notes:           [facility]

ia



               -                               (Same as:           l



               07:00:                               Lovenox)           [place]



               00                                                

 

     Sodium            No                       1,000 mL,           [facility]

a



     Chloride      5-04                               Rate: 125           l



     0.9% IV      06:46:                               ml/hr,           [facility]



     1,000 mL      00                                 Infuse           



                                                  over: 8           



                                                  hr, Route:           



                                                  IV, Dosing           



                                                  Weight           



                                                  127.27 kg,           



                                                  Total           



                                                  Volume:           



                                                  1,000,           



                                                  Start           



                                                  date:           



                                                  18           



                                                  1:46:00           



                                                  CDT,           



                                                  Duration:           



                                                  30 day,           



                                                  Stop date:           



                                                  18           



                                                  1:45:00           



                                                  CDT, 2.44,           



                                                  m2             

 

     Saline            No                       Notes:           [facility]



     Flush 0.9%      5-04                               (Same as:           l



               06:46:                               BD             [facility]



                                                Posiflush)           

 

     Acetaminoph            No                       Notes: Do           M

[facility]



     en        5-04                               not exceed           l



               06:46:                               4 gm/day.           [facility]



                                                (Same as:           



                                                  Tylenol)           

 

     Acetaminoph            No                       Notes:           [facility]

[facility]



     en 325 MG /      5-04                               (Same as:           l



     Hydrocodone      06:46:                               Norco           [facility]

nn



     Bitartrate      00                                 325/5)  Do           



     5 MG Oral                                         not exceed           



     Tablet                                         4gm/day of           



                                                  acetaminop           



                                                  hen.           

 

     Reglan            No                       Notes:           [facility]



               5-04                               (Same as:           l



               04:27:                               Reglan)           [facility]



                                                               

 

     Benadryl            No                       Notes:           [facility]



               5-04                               (Same as:           l



               04:27:                               Benadryl)           [place]



                                                               

 

     Magnesium            No                       Notes:           [facility]

a



     Sulfate      5-04                               WASTE: F/P           l



               04:26:                               - Sink; E           [facility]



               00                                 -              



                                                  [facility]           



                                                  Trash Bin           

 

     Sodium            No                       1,000 mL,           [facility]

a



     Chloride      5-04                               1000           l



     0.9%      04:26:                               ml/hr,           [place]



     (Bolus) IV      00                                 Infuse           



                                                  Over: 1           



                                                  hr, Route:           



                                                  IV, 1,000,           



                                                  Drug form:           



                                                  INJ, ONCE,           



                                                  Priority:           



                                                  STAT,           



                                                  Dosing           



                                                  Weight           



                                                  127.273           



                                                  kg, Start           



                                                  date:           



                                                  18           



                                                  23:26:00           



                                                  CDT, Stop           



                                                  date:           



                                                  18           



                                                  23:26:00           



                                                  CDT            

 

     Zosyn      -      No                       4.5 gm,           [facility]



               5-04                               Route:           l



               04:10:                               IVPB,           [facility]



               00                                 ONCE,           



                                                  Dosing           



                                                  Weight           



                                                  127.273,           



                                                  kg,            



                                                  Priority:           



                                                  STAT,           



                                                  Start           



                                                  date:           



                                                  18           



                                                  23:10:00           



                                                  CDT, Stop           



                                                  date:           



                                                  18           



                                                  23:10:00           



                                                  CDT, ABX           



                                                  Indication           



                                                  :              



                                                  Bacteremia           

 

     Vancomycin      -      No                        2001 mg:           [facility]

[facility]



                                              infuse           l



               04:10:                               over 2.5           [place]



               00                                 hours  For           



                                                  adult           



                                                  patients           



                                                  only:           



                                                  Round to           



                                                  nearest           



                                                  250 mg per           



                                                  Medical           



                                                  Staff           



                                                  approval           



                                                  ***            



                                                  MEDICATION           



                                                  WASTE ***           



                                                  Product           



                                                  Size:           



                                                  1000 mg           



                                                  Product           



                                                  Wasted:           



                                                  ___ mg           

 

     normal            No                       1,000 mL,           [facility]

a



     saline 0.9%                                     Rate: 75           l



     IV 1,000 mL      03:39:                               ml/hr,                                            Infuse           



                                                  over: 13.3           



                                                  hr, Route:           



                                                  IV, Dosing           



                                                  Weight           



                                                  127.273           



                                                  kg, Total           



                                                  Volume:           



                                                  1,000,           



                                                  Start           



                                                  date:           



                                                  18           



                                                  22:39:00           



                                                  CDT,           



                                                  Duration:           



                                                  30 day,           



                                                  Stop date:           



                                                  18           



                                                  22:38:00           



                                                  CDT, 2.36,           



                                                  m2             

 

     Acetaminoph            No                       Notes: Max           

[facility]



     en                                       acetaminop           l



               02:56:                               hen 4000           [place]



               00                                 mg/day (4           



                                                  gm/day).           



                                                  (Same as:           



                                                  Tylenol           



                                                  Extra           



                                                  Strength)           

 

     Rocephin            No                       Notes:           [facility]



                                              (Same As:           l



               02:21:                               Rocephin).           [facility]                                   ***           



                                                  MEDICATION           



                                                  WASTE ***           



                                                  Product           



                                                  Size:           



                                                  1000 mg           



                                                  Product           



                                                  Wasted:           



                                                  _0__ mg           

 

     Morphine            No                       4 mg,           [facility]



                                              Route:           l



               00:05:                               IVP, ONCE,                                            Dosing           



                                                  Weight           



                                                  127.273,           



                                                  kg,            



                                                  Priority:           



                                                  STAT,           



                                                  Start           



                                                  date:           



                                                  18           



                                                  19:05:00           



                                                  CDT, Stop           



                                                  date:           



                                                  18           



                                                  19:05:00           



                                                  CDT            

 

     Benadryl            No                       Notes:           [facility]



               5-03                               (Same as:           l



               23:14:                               Benadryl)           [place]                                                

 

     Benadryl            No                       Notes:           [facility]



               5-03                               (Same as:           l



               22:56:                               Benadryl)           [place]                                                

 

     Morphine            No                       4 mg, 1           [facility]

a



               5-03                               mL, Route:           l



               22:56:                               IVP, Drug                                            form:           



                                                  SOLN,           



                                                  ONCE,           



                                                  Dosing           



                                                  Weight           



                                                  127.273,           



                                                  kg,            



                                                  Priority:           



                                                  STAT,           



                                                  Start           



                                                  date:           



                                                  18           



                                                  17:56:00           



                                                  CDT, Stop           



                                                  date:           



                                                  18           



                                                  17:56:00           



                                                  CDT            

 

     Tylenol Tylenol           Yes  [facility] [place]                1 tablet           [facility]

[facility]



     with with                                    as needed           [facility]



     Codeine #4 Codeine #4                                                   [facility]

[facility]



                                                                 l



                                                                 [facility]



                                                                 ent



                                                                 Clinics

 

     Macrobid Macrobid           Yes  [facility] [place]                1 capsule          

 [facility]



                                                  with food           [facility]



                                                                 [facility]



                                                                 l



                                                                 [facility]



                                                                 ent



                                                                 Clinics

 

     Pantoprazol Pantoprazol           Yes  [place]                1 tablet     

      [facility]



     e Sodium e Sodium                                                   [facility]



                                                                 [facility]



                                                                 l



                                                                 [facility]



                                                                 ent



                                                                 Clinics

 

     Collagenase Collagenase           Yes  [place]                1            

  [facility]



                                                  applicatio           [facility]



                                                  n to           [facility]



                                                  affected           l



                                                  area           [facility]



                                                                 ent



                                                                 Clinics







Vital Signs







             Vital Name   Observation Time Observation Value Comments     Source

 

             Respitory Rate 2018 17:30:00                           [facility]

al [facility]

 

             Systolic (mm Hg) 2018 17:30:00                           [facility]

rial [facility]

 

             Diastolic (mm Hg) 2018 17:30:00                           [facility]

[facility] [place]

 

             Temperature Oral (F) 2018 17:30:00 98.4 F                    

[facility] [place]

 

             Temperature Oral (F) 2018 16:23:00 98.6 F                    

[facility] [place]

 

             Systolic (mm Hg) 2018 16:23:00                           [facility]

rial [place]

 

             Diastolic (mm Hg) 2018 16:23:00                           [facility]

[facility] [place]

 

             Respitory Rate 2018 14:00:00                           [facility]

al [facility]

 

             Systolic (mm Hg) 2018 14:00:00                           [facility]

[facility]

 

             Diastolic (mm Hg) 2018 14:00:00                           [facility]

[facility] [place]

 

             Respitory Rate 2018 13:30:00                           [facility]

[facility]

 

             BMI Calculated 2018 10:16:00                           [facility]

[facility] [place]

 

             Height       2018 10:16:00 149.86 cm                 [facility]

 [place]

 

             Weight       2018 10:16:00                           [facility]

 [place]

 

             Heart Rate   2018 10:16:00                           [facility]

 [facility]

 

             Temperature Oral (F) 2018 10:16:00 97.9 F                    

[facility]

 

             Temperature Oral (F) 2018 13:40:00 97.2 F                    

[facility]

 

             Systolic (mm Hg) 2018 13:40:00                           [facility]

[facility] [place]

 

             Diastolic (mm Hg) 2018 13:40:00                           [facility]

[facility] [place]

 

             Heart Rate   2018 13:40:00                           [facility]

 [place]

 

             Respitory Rate 2018 13:40:00                           [facility]

[facility] [place]

 

             Heart Rate   2018 05:24:00                           [facility]

 [place]

 

             Systolic (mm Hg) 2018 05:24:00                           [facility]

[facility]

 

             Diastolic (mm Hg) 2018 05:24:00                           [facility]

[facility] [place]

 

             Respitory Rate 2018 05:24:00                           [facility]

[facility] [place]

 

             Temperature Oral (F) 2018 05:24:00 98.1 F                    

[facility] [place]

 

             Respitory Rate 2018 01:15:00                           [facility]

[facility] [place]

 

             Systolic (mm Hg) 2018 01:15:00                           [facility]

[facility]

 

             Diastolic (mm Hg) 2018 01:15:00                           [facility]

[facility] [place]

 

             Heart Rate   2018 01:15:00                           [facility]

 [facility]

 

             Temperature Oral (F) 2018 01:15:00 98.1 F                    

[facility]

 

             Weight       2018 14:00:00                           [facility]

 [facility]

 

             Weight       2018 14:00:00                           [facility]

 [place]

 

             Weight       2018 14:00:00                           [facility]

 [place]

 

             BMI Calculated 2018 05:43:00                           [facility]

[facility]

 

             Height       2018 05:43:00 162.56 cm                 [facility]

 [facility]

 

             Height       2018 22:41:00 149.86 cm                 [facility]

 [place]

 

             BMI Calculated 2018 22:41:00                           [facility]

[facility] [place]







Procedures







                Procedure       Date / Time     Performing Clinician Source



                                Performed                       

 

                Cholecystectomy                                 [facility] [place]

 

                Shunt of cerebral ventricle                                 [facility]

[facility] [place]



                to extracranial site                                 







Plan of Care







             Planned Activity Planned Date Details      Comments     Source

 

             Future Scheduled 2023-01-10   Lipid panel               [facility]

s -



             Test         00:00:00     (procedure) [code =              [facility] 

Center



                                       21999360]                 

 

             Future Scheduled 2021   INFLUENZA VACCINE              [facility]



             Test         00:00:00     (Season Ended) [code =              [facility]



                                       INFLUENZA VACCINE              



                                       (Season Ended)]              

 

             Future Scheduled 2021   DEPRESSION SCREENING              [facility]



             Test         00:00:00     (12+) [code =              Medical Center



                                       DEPRESSION SCREENING              



                                       (12+)]                    

 

             Future Scheduled 2020-04-10   Hemoglobin A1c              [facility] -



             Test         00:00:00     measurement               [facility] Center



                                       (procedure) [code =              



                                       38996034]                 

 

             Future Scheduled 2004   DTAP/TDAP/TD VACCINES              [facility]



             Test         00:00:00     (1 - Tdap) [code =              Medical C

enter



                                       DTAP/TDAP/TD VACCINES              



                                       (1 - Tdap)]               

 

             Future Scheduled 2003   HEPATITIS C SCREENING              [facility]



             Test         00:00:00     [code = HEPATITIS C              Medical 

Center



                                       SCREENING]                

 

             Future Scheduled 1997   COVID-19 VACCINE (1)              [facility]



             Test         00:00:00     [code = COVID-19              Medical Abilio

ter



                                       VACCINE (1)]              

 

             Future Scheduled 1995   DIABETIC EYE EXAM              [facility]



             Test         00:00:00     [code = DIABETIC EYE              Medical

 Center



                                       EXAM]                     

 

             Future Scheduled 1995   Urine screening for              [facility]



             Test         00:00:00     protein (procedure)              [facility] 

Center



                                       [code = 949397619]              

 

             Future Scheduled 1991   PNEUMOCOCCAL VACCINE              [facility]



             Test         00:00:00     0-64 YRS (1 of 1 -              Medical C

enter



                                       PPSV23) [code =              



                                       PNEUMOCOCCAL VACCINE              



                                       0-64 YRS (1 of 1 -              



                                       PPSV23)]                  







Encounters







        Start   End     Encounter Admission Attending Care    Care    Encounter 

Source



        Date/Time Date/Time Type    Type    Clinicians Facility Department ID   

   

 

        2021-08-10 2021-08-10 Outpatient                 [facility]  [id number]

 [facility]



        [facility]



                                                                        l



                                                                        Outpati



                                                                        ent



                                                                        Clinics

 

        2021 Outpatient                 [facility]  [id number]

 [facility]



        [facility]00                                                 [facility] 

-



                                                                        [facility]



                                                                        l



                                                                        Outpati



                                                                        ent



                                                                        Clinics

 

        2021 Outpatient                 [facility]  [id number]

 [facility]



        [facility]00                                                 [facility] 

-



                                                                        [facility]



                                                                        l



                                                                        Outpati



                                                                        ent



                                                                        Clinics

 

        2021 [facility]         [facility]    [id number]

[id number] 



        18:22:00 22:21:00                 Bossman Duron [id number]        

 



                                                [place] [id number]         



                                                [place] [phone]         



                                                        084             

 

        2019 Phone                   nullFlavo MNA     [id number]

55 [facility]



        16:11:26 04:59:59 Message                 r       Neurosurger 08      l



                                                        y [facility]

 

        2019 Outpatient                 MHMISCHER MHMISCHER [id number]

[id number] 



        11:11:26 23:59:59                                         08      

 

        2019 Phone                   nullFlavo MNA     [id number]

55 [facility]



        16:16:47 04:59:59 Message                 r       Neurosurger 07      l



                                                        y [facility]

 

        2019 Outpatient                 MHMISCHER MHMISCHER 551

[id number] 



        11:16:47 23:59:59                                         07      

 

        2019-07-15 2019 Phone                   nullFlavo MNA     [id number]

55 [facility]



        15:52:03 04:59:59 Message                 r       Neurosurger 06      l



                                                        y [facility]

 

        2019-07-15 2019 Outpatient                 MHMISCHER MHMISCHER 551

[id number] 



        10:52:03 23:59:59                                         06      

 

        2019 Phone                   nullFlavo MNA     [id number]

55 [facility]



        18:55:03 04:59:59 Message                 r       Neurosurger 05      l



                                                        y [facility]

 

        2019 Outpatient                 MHMISCHER MHMISCHER 551

[id number] 



        13:55:03 23:59:59                                         05      

 

        2018 Emergency                 nullFlavo [facility] [id number]

[id number] [facility]



        10:12:00 18:24:00                         joanie Garcia [address] [place] [address]

 

        2018 Outpatient         Vij[facility]   [id number]

[id number] 



        04:12:00 12:24:00                 Dar Dial      

 

        2018 Outpatient                 [facility]

[id number] [facility]



        11:15:00 11:15:00                         t [facility]  Medicine         l



                                                Medicine                 Outpati



                                                                        ent



                                                                        Clinics

 

        2018 Outpatient                 [facility]

[id number] [facility]



        11:30:00 11:30:00                         [place]   [address]         Luke

s [facility]                 Outpati



                                                                        ent



                                                                        Clinics

 

        2018 Outpatient                 [facility]

[id number] [facility]



        15:41:00 15:41:00                         t [facility]

s [facility]                 Outpati



                                                                        ent



                                                                        Clinics

 

        2018 Outpatient                 [facility]

[id number] [facility]



        15:42:00 15:42:00                         [facility]

s [facility]                 Outpati



                                                                        ent



                                                                        Clinics

 

        2018 Outpatient                 [facility]

[id number] [facility]



        11:00:00 11:00:00                         [facility]

s [facility]                 Outpati



                                                                        ent



                                                                        [facility]

 

        2018 Inpatient                 [facility] [id number]

[id number] [facility]



        22:40:00 17:28:00                         [address]

 

        2018 Outpatient         Deedee Reinoso [facility]   551

[id number] 



        17:40:00 12:28:00                 [place] [address]      







Results







           Test Description Test Time  Test Comments Results    Result Comments 

Source









                    POCT-GLUCOSE METER  2020 13:03:00 









                      Test Item  Value      Reference Range Interpretation Comme

nts









             POC-GLUCOSE METER ([facility]) 140 mg/dL           H            :

 TESTED AT [address]



             (test code = 1538)                                        [facility]

X, [address]:



                                                                 /Techni

christina ID = [id number] for



                                                                 CLAUDIA DELATORRE



POCT-GLUCOSE [id number] 09:15:00





             Test Item    Value        Reference Range Interpretation Comments

 

             POC-GLUCOSE METER 142 mg/dL           H            : TESTED A

[facility] 67



             ([facility]) (test code =                                        [facility],



             1538)                                               [address]:



                                                                 /Techni

christina ID



                                                                 = [id number] for ANANT CATES



POCT-GLUCOSE METER2020-01-15 20:56:00





             Test Item    Value        Reference Range Interpretation Comments

 

             POC-GLUCOSE METER 134 mg/dL           H            : TESTED A

[facility] 6720



             ([facility]) (test code =                                        [facility],



             1538)                                               [id number]:



                                                                 /Techni

christina ID



                                                                 = [id number] for SHERRILL GUARDADO



POCT-GLUCOSE METER2020-01-15 16:46:00





             Test Item    Value        Reference Range Interpretation Comments

 

             POC-GLUCOSE METER 91 mg/dL                         : TESTED A

T [facility] 6720



             (BEAKER) (test code =                                        MILY NELSON [facility],



             1538)                                               [id number]:



                                                                 /Techni

christina ID =



                                                                 [id number] for ANANT HAMILTON



POCT-GLUCOSE METER2020-01-15 12:19:00





             Test Item    Value        Reference Range Interpretation Comments

 

             POC-GLUCOSE METER 237 mg/dL           H            : TESTED A

T [facility] 6720



             (BEAKER) (test code =                                        MILY NELSON [facility],



             1538)                                               [address]:



                                                                 /Techni

christina ID



                                                                 = [id number] for ANANT CATES



MR, EXTREMITY, LOWER, WITHOUT CONTRAST, RIGHT2020-01-15 09:12:00FINAL REPORT 
PATIENT ID:   [id number] MRI of the right and left hindfoot without intravenous 
contrast History: Osteomyelitis, diabetic foot Comparison: Radiograph dated 
2020 Technique: Multiplanar multisequence MRI of the right and left 
hindfoot was performed without intravenous contrast using the hindfoot 
osteomyelitis protocol Findings: Right foot: Marked T1 and T2 hypointense soft 
tissue thickening is noted at the medial aspect of the right heel, likely to 
reflect scar tissue. There is also mild subcutaneous edema at the lateral aspect
of the mid foot and forefoot, incompletely imaged. No discrete drainable fluid 
collection is identified. There is no marrow signal abnormality to suggest 
osteomyelitis. There is a small nonspecific joint effusion at the ankle and 
subtalar joint. Scattered degenerative changes are noted. There is marked fatty 
atrophy of the distal leg and foot musculature. Severe flexor hallucis longus 
tendinopathy. No tenosynovitis. Left foot: There is a large area ofsoft tissue 
defect and granulation tissue at the posteromedial aspect of the heel, reaching 
the calcaneus, but there is no drainable fluid collection. Deformity is noted in
the hindfoot, and the forefoot appears adducted. No acute fracture. No marrow 
signal abnormality is identified to indicate acute osteomyelitis. There is no 
significant joint effusion. There is severe atrophy of the foot and distalleg 
musculature. No significant tenosynovitis identified. IMPRESSION: 
Granulation/scar tissue at themedial aspect of the heel bilaterally. No MR 
evidence of osteomyelitis. Severe muscle atrophy. Signed: Nguyen Cornejo 
Verified Date/Time:  01/15/2020 09:12:01 Reading Location: [address] Ortho 
Consult Reading Room        Electronically signed by: NGUYEN CORNEJO M.D. on 
01/15/2020 09:12 AMMR, EXTREMITY, LOWER, WITHOUT CONTRAST, LEFT2020-01-15 
09:12:00FINAL REPORT PATIENT ID:   [id number] MRI of the right and left hindfoot 
without intravenous contrast History: Osteomyelitis, diabetic foot Comparison: 
Radiograph dated 2020 Technique: Multiplanar multisequence MRI of the
right and left hindfoot was performed without intravenous contrast using the 
hindfoot osteomyelitis protocol Findings: Right foot: Marked T1 and T2 
hypointense soft tissue thickening is noted at the medial aspect of the right 
heel, likely to reflect scar tissue. There is also mild subcutaneous edema at 
the lateral aspect of the mid foot and forefoot, incompletely imaged. No 
discrete drainable fluid collection is identified. There is no marrow signal 
abnormality to suggest osteomyelitis. There is a small nonspecific joint 
effusion at the ankle and subtalar joint. Scattered degenerative changes are 
noted. There is marked fatty atrophy of the distal leg and foot musculature. 
Severe flexor hallucis longus tendinopathy. No tenosynovitis. Left foot: There 
is a large area ofsoft tissue defect and granulation tissue at the posteromedial
aspect of the heel, reaching the calcaneus, but there is no drainable fluid 
collection. Deformity is noted in the hindfoot, and the forefoot appears 
adducted. No acute fracture. No marrow signal abnormality is identified to 
indicate acute osteomyelitis. There is no significant joint effusion. There is 
severe atrophy of the foot and distalleg musculature. No significant 
tenosynovitis identified. IMPRESSION: Granulation/scar tissue at themedial 
aspect of the heel bilaterally. No MR evidence of osteomyelitis. Severe muscle 
atrophy. Signed: Nguyen Cornejo MDReport Verified Date/Time:  01/15/2020 09:12:01 
Reading Location: [facility] C013X Ortho Consult Reading Room        Electronically 
signed by: NGUYEN CORNEJO M.D. on 01/15/2020 09:12 AMPOCT-GLUCOSE METER2020-01-15 
08:47:00





             Test Item    Value        Reference Range Interpretation Comments

 

             POC-GLUCOSE METER 264 mg/dL           H            : TESTED A

T [facility] 6720



             [facility] (test code =                                        [facility],



             153)                                               [id number]:



                                                                 /Techni

christina ID



                                                                 = [id number] for ANANT CATES



ISLET CELL AB SCR2020-01-15 07:43:00





             Test Item    Value        Reference Range Interpretation Comments

 

             ISLET CELL AB Refer to individual                           



             AUTOVERIFICATION (test Islet Cell Ab                           



             code = 2556) and/or Islet Cell                           



                          Ab Titer results.                           



POCT-GLUCOSE [id number] 21:27:00





             Test Item    Value        Reference Range Interpretation Comments

 

             POC-GLUCOSE METER 130 mg/dL           H            : TESTED A

T [facility] 6720



             ([employer]) (test code =                                        [facility],



             153)                                               [id number]:



                                                                 /Techni

christina ID



                                                                 = [id number] for JESICA NEW



                                                                 KRANTHI



BLOOD [id number] 13:00:00





             Test Item    Value        Reference Range Interpretation Comments

 

             CULTURE (BEAKER) (test No growth in 5 days                         

  



             code = 1095)                                        



BLOOD [id number] 13:00:00





             Test Item    Value        Reference Range Interpretation Comments

 

             CULTURE (BEAKER) (test No growth in 5 days                         

  



             code = 1095)                                        



POCT-GLUCOSE [id number] 12:57:00





             Test Item    Value        Reference Range Interpretation Comments

 

             POC-GLUCOSE METER 138 mg/dL           H            : TESTED A

[facility]



             [facility] (test code =                                        [facility],



             )                                               [address]:



                                                                 /Techni

christina ID



                                                                 = [id number] for [place]

LLIS,



                                                                 BARBARA



POCT-GLUCOSE [id number] 08:43:00





             Test Item    Value        Reference Range Interpretation Comments

 

             POC-GLUCOSE METER 226 mg/dL           H            : TESTED A

[facility]



             ([facility]) (test code =                                        [facility],



             153)                                               [id number]:



                                                                 /Techni

christina ID



                                                                 = [id number] for [place]

LLIS,



                                                                 BARBARA



POCT-GLUCOSE [id number] 21:11:00





             Test Item    Value        Reference Range Interpretation Comments

 

             POC-GLUCOSE METER 254 mg/dL           H            : Notified

 RN/MD:



             ([facility]) (test code =                                        TESTED

 AT [place] [id number]



             )                                               [facility] [facility],



                                                                 [address]:



                                                                 /Techni

christina ID



                                                                 = [id number] for JESICA BROWNCRISTO



                                                                 KRANTHI



POCT-GLUCOSE [id number] 21:02:00





             Test Item    Value        Reference Range Interpretation Comments

 

             POC-GLUCOSE METER 177 mg/dL           H            : TESTED A

T [facility] [address]



             (BEAKER) (test code =                                        MILY NELSON [facility],



             1538)                                               [id number]:



                                                                 /Techni

christina ID



                                                                 = [id number] for [place]

LLIS,



                                                                 BARBARA



POCT-GLUCOSE [id number] 12:31:00





             Test Item    Value        Reference Range Interpretation Comments

 

             POC-GLUCOSE METER 215 mg/dL           H            : TESTED A

T [facility] [address]



             ([place] SteeleAKER) (test code =                                        MILY NELSON [facility],



             1538)                                               [address]:



                                                                 /Techni

christina ID



                                                                 = [id number] for [place]

LLIS,



                                                                 BARBARA



WOUND CULTURE + GRAM [id number] 10:10:00





             Test Item    Value        Reference    Interpretation Comments



                                       Range                     

 

             CULTURE (BEAKER) PROTEUS MIRABILIS              A            1+ Pro

teus



             (test code = 1095)                                        mirabilis

 

             Amikacin (test code =                           S            



             1)                                                  

 

             Ampicillin +                           S            



             Sulbactam (test code                                        



             = 6)                                                

 

             Aztreonam (test code                           S            



             = 32)                                               

 

             Cefepime (test code =                           S            



             51)                                                 

 

             Cefoxitin (test code                           R            



             = 68)                                               

 

             Ceftazidime (test                           S            



             code = 27)                                          

 

             Ceftriaxone (test                           S            



             code = 52)                                          

 

             Ertapenem (test code                           S            



             = 38)                                               

 

             Gentamicin (test code                           S            



             = 18)                                               

 

             Levofloxacin (test                           R            



             code = 22)                                          

 

             Meropenem (test code                           S            



             = 34)                                               

 

             Piperacillin +                           S            



             Tazobactam (test code                                        



             = 29)                                               

 

             Tetracycline (test                           R            



             code = 2)                                           

 

             Tobramycin (test code                           S            



             = 25)                                               

 

             Trimethoprim +                           R            



             Sulfamethoxazole                                        



             (test code = 47)                                        

 

             CULTURE (BEAKER) METHICILLIN               A            1+ Methicil

bryn



             (test code = 1095) RESISTANT                              resistant



                          STAPHYLOCOCCUS                           Staphylococcu

s



                          AUREUS                                 aureus

 

             Clindamycin (test                           R            



             code = 10)                                          

 

             Erythromycin (test                           R            



             code = 4)                                           

 

             Linezolid (test code                           S            



             = 40)                                               

 

             Nitrofurantoin (test                           S            



             code = 23)                                          

 

             Oxacillin (test code                           R            



             = 14)                                               

 

             Rifampin (test code =                           S            



             43)                                                 

 

             Tetracycline (test                           S            



             code = 2)                                           

 

             Trimethoprim +                           S            



             Sulfamethoxazole                                        



             (test code = 47)                                        

 

             Vancomycin (test code                           S            



             = 13)                                               

 

             CULTURE (BEAKER) ESCHERICHIA COLI              A            <1+ Esc

herichia



             (test code = 1095)                                        coliESBL 

Positive

 

             Amikacin (test code =                           S            



             1)                                                  

 

             Ampicillin +                           R            



             Sulbactam (test code                                        



             = 6)                                                

 

             Aztreonam (test code                           R            



             = 32)                                               

 

             Cefepime (test code =                           R            



             51)                                                 

 

             Cefoxitin (test code                           R            



             = 68)                                               

 

             Ceftazidime (test                           R            



             code = 27)                                          

 

             Ceftriaxone (test                           R            



             code = 52)                                          

 

             Ertapenem (test code                           S            



             = 38)                                               

 

             Gentamicin (test code                           S            



             = 18)                                               

 

             Levofloxacin (test                           R            



             code = 22)                                          

 

             Meropenem (test code                           S            



             = 34)                                               

 

             Nitrofurantoin (test                           S            



             code = 23)                                          

 

             Piperacillin +                           R            



             Tazobactam (test code                                        



             = 29)                                               

 

             Tetracycline (test                           S            



             code = 2)                                           

 

             Tobramycin (test code                           S            



             = 25)                                               

 

             Trimethoprim +                           R            



             Sulfamethoxazole                                        



             (test code = 47)                                        

 

             CULTURE (BEAKER)                           A            Beta-hemoly

tic



             (test code = 1095)                                        streptoco

ccus



                                                                 group B, by



                                                                 serological



                                                                 grouping

 

             GRAM STAIN RESULT 3+ WBCs                                



             (BEAKER) (test code =                                        



             1123)                                               

 

             GRAM STAIN RESULT 1+ gram negative                           



             (BEAKER) (test code = rods                                   



             418312)                                             

 

             GRAM STAIN RESULT 2+ gram positive                           



             (BEAKER) (test code = rods                                   



             161714)                                             

 

             GRAM STAIN RESULT <1+ gram negative                           



             (BEAKER) (test code = coccobacilli                           



             344212)                                             

 

             GRAM STAIN RESULT 2+ gram positive                           



             (BEAKER) (test code = cocci in pairs                           



             316201)                                             



POCT-GLUCOSE [id number] 08:52:00





             Test Item    Value        Reference Range Interpretation Comments

 

             POC-GLUCOSE METER 209 mg/dL           H            : TESTED A

T [facility]



             ([employer]) (test code =                                        [facility]

[place] [facility],



             153)                                               [address]:



                                                                 /Techni

christina ID



                                                                 = [id number] for [place]

LLIS,



                                                                 BARBARA



POCT-GLUCOSE [id number] 21:26:00





             Test Item    Value        Reference Range Interpretation Comments

 

             POC-GLUCOSE METER 255 mg/dL           H            : TESTED A

T [facility] [address]



             ([employer]) (test code =                                        [facility]

[place] [facility],



             153)                                               [address]:



                                                                 /Techni

christina ID



                                                                 = [id number] for CR

RADHA,



                                                                 TIA



POCT-GLUCOSE [id number] 17:34:00





             Test Item    Value        Reference Range Interpretation Comments

 

             POC-GLUCOSE METER 227 mg/dL           H            : TESTED A

T [facility] [address]



             ([employer]) (test code =                                        [facility],



             153)                                               [address]:



                                                                 /Techni

christina ID



                                                                 = [id number] for WASSERMAN

DALTON,



                                                                 NAKITA



POCT-GLUCOSE [id number] 11:28:00





             Test Item    Value        Reference Range Interpretation Comments

 

             POC-GLUCOSE METER 282 mg/dL           H            : TESTED A

T [facility] [address]20



             ([facility]) (test code =                                        [facility],



             1538)                                               [address]:



                                                                 /Techni

christina ID



                                                                 = [id number] for WASSERMAN

DALTON,



                                                                 NAKITA



POCT-GLUCOSE [id number] 08:19:00





             Test Item    Value        Reference Range Interpretation Comments

 

             POC-GLUCOSE METER 329 mg/dL           H            : TESTED A

T [facility] 6720



             ([facility]) (test code =                                        [facility],



             153)                                               [address]:



                                                                 /Techni

christina ID



                                                                 = [id number] for ANANT CATES



POCT-GLUCOSE [id number] 21:32:00





             Test Item    Value        Reference Range Interpretation Comments

 

             POC-GLUCOSE METER 275 mg/dL           H            : TESTED A

T [facility] 6720



             ([facility]) (test code =                                        [facility],



             1538)                                               [address]:



                                                                 /Techni

christina ID



                                                                 = [id number] for SHERRILL GUARDADO



POCT-GLUCOSE [id number] 17:47:00





             Test Item    Value        Reference Range Interpretation Comments

 

             POC-GLUCOSE METER 304 mg/dL           H            : TESTED A

T [facility] 6720



             ([facility]) (test code =                                        [facility],



             [facility])                                               [address]:



                                                                 /Techni

christina ID



                                                                 = [id number] for [place]

RIN,



                                                                 LUIZA



URINE [id number] 15:21:00





             Test Item    Value        Reference Range Interpretation Comments

 

             [facility]                           A            >100,000 co

l/mL



             (test code = 1095)                                        Beta-hemo

lytic



                                                                 streptococcus g

roup



                                                                 B, by serologic

al



                                                                 grouping

 

             CULTURE ([employer]) PROTEUS                   A            80-89,000 c

ol/mL



             (test code = 1095) MIRABILIS                              Proteus



                                                                 mirabilisESBL



                                                                 Positive

 

             Amikacin (test code =                           S            



             1)                                                  

 

             Ampicillin +                           R            



             Sulbactam (test code                                        



             = 6)                                                

 

             Aztreonam (test code                           R            



             = 32)                                               

 

             Cefepime (test code =                           R            



             51)                                                 

 

             Cefoxitin (test code                           R            



             = 68)                                               

 

             Ceftazidime (test                           R            



             code = 27)                                          

 

             Ceftriaxone (test                           R            



             code = 52)                                          

 

             Ertapenem (test code                           S            



             = 38)                                               

 

             Gentamicin (test code                           R            



             = 18)                                               

 

             Levofloxacin (test                           R            



             code = 22)                                          

 

             Meropenem (test code                           S            



             = 34)                                               

 

             Nitrofurantoin (test                           R            



             code = 23)                                          

 

             Tetracycline (test                           R            



             code = 2)                                           

 

             Tobramycin (test code                           R            



             = 25)                                               



POCT-GLUCOSE [id number] 12:16:00





             Test Item    Value        Reference Range Interpretation Comments

 

             POC-GLUCOSE METER 337 mg/dL           H            : TESTED A

T [facility] 6720



             (BEAKER) (test code =                                        [facility],



             1538)                                               [address]:



                                                                 /Techni

christina ID



                                                                 = [id number] for HAWK WAN,



                                                                 LUIZA



BASIC METABOLIC [id number] 10:39:00





             Test Item    Value        Reference Range Interpretation Comments

 

             SODIUM (BEAKER) 134 meq/L    136-145      L            



             (test code = 381)                                        

 

             POTASSIUM (BEAKER) 4.6 meq/L    3.5-5.1                   



             (test code = 379)                                        

 

             CHLORIDE (BEAKER) 105 meq/L                        



             (test code = 382)                                        

 

             CO2 (BEAKER) (test 20 meq/L     22-29        L            



             code = 355)                                         

 

             BLOOD UREA NITROGEN 14 mg/dL     7-21                      



             (BEAKER) (test code                                        



             = 354)                                              

 

             CREATININE (BEAKER) 0.97 mg/dL   0.57-1.25                 



             (test code = 358)                                        

 

             GLUCOSE RANDOM 429 mg/dL           HH           



             (BEAKER) (test code                                        



             = 652)                                              

 

             CALCIUM (BEAKER) 8.9 mg/dL    8.4-10.2                  



             (test code = 697)                                        

 

             EGFR (BEAKER) (test 107 mL/min/1.73                           ESTIM

ATED GFR IS



             code = 1092) sq m                                   NOT AS ACCURATE

 AS



                                                                 CREATININE



                                                                 CLEARANCE IN



                                                                 PREDICTING



                                                                 GLOMERULAR



                                                                 FILTRATION RATE

.



                                                                 ESTIMATED GFR I

S



                                                                 NOT APPLICABLE 

FOR



                                                                 DIALYSIS PATIEN

TS.



 ID - MAGAN FPOCT-GLUCOSE [id number] 08:29:00





             Test Item    Value        Reference Range Interpretation Comments

 

             POC-GLUCOSE METER 395 mg/dL           H            : TESTED A

T [facility] [address]



             (BEAKER) (test code =                                        [facility],



             1538)                                               [address]:



                                                                 /Techni

christina ID



                                                                 = [id number] for [place]

RIN,



                                                                 LUIZA



CBC W/PLT COUNT &amp; AUTO [id number] 06:40:00





             Test Item    Value        Reference Range Interpretation Comments

 

             WHITE BLOOD CELL COUNT (BEAKER) 7.2 K/ L     3.5-10.5              

    



             (test code = 775)                                        

 

             RED BLOOD CELL COUNT (BEAKER) 5.48 M/ L    4.63-6.08               

  



             (test code = 761)                                        

 

             HEMOGLOBIN (BEAKER) (test code = 15.1 GM/DL   13.7-17.5            

     



             410)                                                

 

             HEMATOCRIT (BEAKER) (test code = 46.4 %       40.1-51.0            

     



             411)                                                

 

             MEAN CORPUSCULAR VOLUME (BEAKER) 84.7 fL      79.0-92.2            

     



             (test code = 753)                                        

 

             MEAN CORPUSCULAR HEMOGLOBIN 27.6 pg      25.7-32.2                 



             (BEAKER) (test code = 751)                                        

 

             MEAN CORPUSCULAR HEMOGLOBIN CONC 32.5 GM/DL   32.3-36.5            

     



             (BEAKER) (test code = 752)                                        

 

             RED CELL DISTRIBUTION WIDTH 15.5 %       11.6-14.4    H            



             (BEAKER) (test code = 412)                                        

 

             PLATELET COUNT (BEAKER) (test 315 K/CU MM  150-450                 

  



             code = 756)                                         

 

             MEAN PLATELET VOLUME (BEAKER) 10.5 fL      9.4-12.4                

  



             (test code = 754)                                        

 

             NUCLEATED RED BLOOD CELLS 0 /100 WBC   0-0                       



             (BEAKER) (test code = 413)                                        

 

             NEUTROPHILS RELATIVE PERCENT 62 %                                  

 



             (BEAKER) (test code = 429)                                        

 

             LYMPHOCYTES RELATIVE PERCENT 26 %                                  

 



             (BEAKER) (test code = 430)                                        

 

             MONOCYTES RELATIVE PERCENT 9 %                                    



             (BEAKER) (test code = 431)                                        

 

             EOSINOPHILS RELATIVE PERCENT 2 %                                   

 



             (BEAKER) (test code = 432)                                        

 

             BASOPHILS RELATIVE PERCENT 0 %                                    



             (BEAKER) (test code = 437)                                        

 

             NEUTROPHILS ABSOLUTE COUNT 4.48 K/ L    1.78-5.38                 



             (BEAKER) (test code = 670)                                        

 

             LYMPHOCYTES ABSOLUTE COUNT 1.87 K/ L    1.32-3.57                 



             (BEAKER) (test code = 414)                                        

 

             MONOCYTES ABSOLUTE COUNT (BEAKER) 0.62 K/ L    0.30-0.82           

      



             (test code = 415)                                        

 

             EOSINOPHILS ABSOLUTE COUNT 0.17 K/ L    0.04-0.54                 



             (BEAKER) (test code = 416)                                        

 

             BASOPHILS ABSOLUTE COUNT (BEAKER) 0.03 K/ L    0.01-0.08           

      



             (test code = 417)                                        

 

             IMMATURE GRANULOCYTES-RELATIVE 1 %          0-1                    

   



             PERCENT (BEAKER) (test code =                                      

  



             2801)                                               



POCT-GLUCOSE METER2020-01-10 19:55:00





             Test Item    Value        Reference Range Interpretation Comments

 

             POC-GLUCOSE METER 369 mg/dL           H            : TESTED A

T [facility] [address]



             (BEAKER) (test code =                                        [facility],



             1538)                                               [id number]:



                                                                 /Techni

christina ID



                                                                 = [id number] for CR

SHERRILL GARCIA



POCT-GLUCOSE METER2020-01-10 16:45:00





             Test Item    Value        Reference Range Interpretation Comments

 

             POC-GLUCOSE METER 272 mg/dL           H            : TESTED A

T [facility] [address]



             (BEAKER) (test code =                                        [facility],



             1538)                                               [id number]:



                                                                 /Techni

christina ID



                                                                 = [id number] for SARAH VIDES



POCT-GLUCOSE METER2020-01-10 11:40:00





             Test Item    Value        Reference Range Interpretation Comments

 

             POC-GLUCOSE METER 277 mg/dL           H            : TESTED A

T [facility] [address]



             (BEAKER) (test code =                                        [facility],



             1538)                                               [address]:



                                                                 /Techni

christina ID



                                                                 = [id number] for SARAH VIDES



BASIC METABOLIC PANEL2020-01-10 10:22:00





             Test Item    Value        Reference Range Interpretation Comments

 

             SODIUM (BEAKER) 132 meq/L    136-145      L            



             (test code = 381)                                        

 

             POTASSIUM (BEAKER) 4.4 meq/L    3.5-5.1                   



             (test code = 379)                                        

 

             CHLORIDE (BEAKER) 103 meq/L                        



             (test code = 382)                                        

 

             CO2 (BEAKER) (test 21 meq/L     22-29        L            



             code = 355)                                         

 

             BLOOD UREA NITROGEN 14 mg/dL     7-21                      



             (BEAKER) (test code                                        



             = 354)                                              

 

             CREATININE (BEAKER) 0.89 mg/dL   0.57-1.25                 



             (test code = 358)                                        

 

             GLUCOSE RANDOM 428 mg/dL           HH           



             (BEAKER) (test code                                        



             = 652)                                              

 

             CALCIUM (BEAKER) 9.2 mg/dL    8.4-10.2                  



             (test code = 697)                                        

 

             EGFR (BEAKER) (test 119 mL/min/1.73                           ESTIM

ATED GFR IS



             code = 1092) sq m                                   NOT AS ACCURATE

 AS



                                                                 CREATININE



                                                                 CLEARANCE IN



                                                                 PREDICTING



                                                                 GLOMERULAR



                                                                 FILTRATION RATE

.



                                                                 ESTIMATED GFR I

S



                                                                 NOT APPLICABLE 

FOR



                                                                 DIALYSIS PATIEN

TS.



 ID - NTPLIPID PANEL2020-01-10 10:17:00





             Test Item    Value        Reference Range Interpretation Comments

 

             TRIGLYCERIDES (BEAKER) (test code = 692 mg/dL                      

        



             540)                                                

 

             CHOLESTEROL (BEAKER) (test code = 196 mg/dL                        

      



             631)                                                

 

             HDL CHOLESTEROL (BEAKER) (test code 24 mg/dL                       

        



             = 976)                                              



Calculated LDL not valid if triglyceride &gt;400 mg/dLTriglyceride Reference 
Range:   Low Risk  &lt;150   Borderline    150-199   High Risk     200-499   
Very High Risk  &gt;=500Cholesterol Reference Range:   Low Risk         &lt;200 
 Borderline    200-239    High Risk        &gt;240HDL Cholesterol Reference 
Range:   Low Risk         &gt;=60   High Risk         &lt;40LDL Cholesterol 
ReferenceRange:   Optimal          &lt;100   Near Optimal  100-129   Borderline 
  130-159   High          160-189   Very High       &gt;=190    ID - NTP
POCT-GLUCOSE METER2020-01-10 08:28:00





             Test Item    Value        Reference Range Interpretation Comments

 

             POC-GLUCOSE METER 388 mg/dL           H            : TESTED A

T [facility] [address]



             (BEAKER) (test code =                                        [facility],



             1538)                                               [id number]:



                                                                 /Techni

christina ID



                                                                 = [id number] for NAANT CATES



HEMOGLOBIN A1C2020-01-10 07:54:00





             Test Item    Value        Reference Range Interpretation Comments

 

             HEMOGLOBIN A1C (BEAKER) (test code = 10.4 %       4.3-6.1      H   

         



             368)                                                



CBC W/PLT COUNT &amp; AUTO DIFFERENTIAL2020-01-10 05:56:00





             Test Item    Value        Reference Range Interpretation Comments

 

             WHITE BLOOD CELL COUNT (BEAKER) 6.5 K/ L     3.5-10.5              

    



             (test code = 775)                                        

 

             RED BLOOD CELL COUNT (BEAKER) 5.21 M/ L    4.63-6.08               

  



             (test code = 761)                                        

 

             HEMOGLOBIN (BEAKER) (test code = 14.6 GM/DL   13.7-17.5            

     



             410)                                                

 

             HEMATOCRIT (BEAKER) (test code = 44.3 %       40.1-51.0            

     



             411)                                                

 

             MEAN CORPUSCULAR VOLUME (BEAKER) 85.0 fL      79.0-92.2            

     



             (test code = 753)                                        

 

             MEAN CORPUSCULAR HEMOGLOBIN 28.0 pg      25.7-32.2                 



             (BEAKER) (test code = 751)                                        

 

             MEAN CORPUSCULAR HEMOGLOBIN CONC 33.0 GM/DL   32.3-36.5            

     



             (BEAKER) (test code = 752)                                        

 

             RED CELL DISTRIBUTION WIDTH 15.6 %       11.6-14.4    H            



             (BEAKER) (test code = 412)                                        

 

             PLATELET COUNT (BEAKER) (test 294 K/CU MM  150-450                 

  



             code = 756)                                         

 

             MEAN PLATELET VOLUME (BEAKER) 11.2 fL      9.4-12.4                

  



             (test code = 754)                                        

 

             NUCLEATED RED BLOOD CELLS 0 /100 WBC   0-0                       



             (BEAKER) (test code = 413)                                        

 

             NEUTROPHILS RELATIVE PERCENT 56 %                                  

 



             (BEAKER) (test code = 429)                                        

 

             LYMPHOCYTES RELATIVE PERCENT 31 %                                  

 



             (BEAKER) (test code = 430)                                        

 

             MONOCYTES RELATIVE PERCENT 10 %                                   



             (BEAKER) (test code = 431)                                        

 

             EOSINOPHILS RELATIVE PERCENT 2 %                                   

 



             (BEAKER) (test code = 432)                                        

 

             BASOPHILS RELATIVE PERCENT 1 %                                    



             (BEAKER) (test code = 437)                                        

 

             NEUTROPHILS ABSOLUTE COUNT 3.66 K/ L    1.78-5.38                 



             (BEAKER) (test code = 670)                                        

 

             LYMPHOCYTES ABSOLUTE COUNT 2.03 K/ L    1.32-3.57                 



             (BEAKER) (test code = 414)                                        

 

             MONOCYTES ABSOLUTE COUNT (BEAKER) 0.63 K/ L    0.30-0.82           

      



             (test code = 415)                                        

 

             EOSINOPHILS ABSOLUTE COUNT 0.15 K/ L    0.04-0.54                 



             (BEAKER) (test code = 416)                                        

 

             BASOPHILS ABSOLUTE COUNT (BEAKER) 0.03 K/ L    0.01-0.08           

      



             (test code = 417)                                        

 

             IMMATURE GRANULOCYTES-RELATIVE 1 %          0-1                    

   



             PERCENT (BEAKER) (test code =                                      

  



             2801)                                               



POCT-GLUCOSE [id number] 23:47:00





             Test Item    Value        Reference Range Interpretation Comments

 

             POC-GLUCOSE METER 359 mg/dL           H            : Notified

 RN/MD:



             (KUN) (test code =                                        TESTED

 AT [facility] 0146 9942)                                               [facility],



                                                                 [address]:



                                                                 /Techni

christina ID



                                                                 = [id number] for



                                                                 ALESSIA HUIZAR



POCT-GLUCOSE [id number] 21:13:00





             Test Item    Value        Reference Range Interpretation Comments

 

             POC-GLUCOSE METER 315 mg/dL           H            : TESTED A

[facility] [address]



             (BEAKER) (test code =                                        [facility],



             1538)                                               [address]:



                                                                 /Techni

christina ID



                                                                 = [id number] for Sm

marga,



                                                                 Nicki



POCT-GLUCOSE [id number] 21:13:00





             Test Item    Value        Reference Range Interpretation Comments

 

             POC-GLUCOSE METER 331 mg/dL           H            : TESTED A

T [facility] [address]



             (BEAKER) (test code =                                        [facility],



             1538)                                               [id number]:



                                                                 /Techni

christina ID



                                                                 = [id number] for RO

OSCAR KUO



POCT-GLUCOSE [id number] 10:30:00





             Test Item    Value        Reference Range Interpretation Comments

 

             POC-GLUCOSE METER 341 mg/dL           H            : TESTED A

T [facility] [address]



             (BEAKER) (test code =                                        [facility],



             153)                                               [address]:



                                                                 /Techni

christina ID



                                                                 = [id number] for Sm

ith,



                                                                 Nicki



CBC W/PLT COUNT &amp; AUTO [id number] 08:48:00





             Test Item    Value        Reference Range Interpretation Comments

 

             WHITE BLOOD CELL COUNT (BEAKER) 6.7 K/ L     3.5-10.5              

    



             (test code = 775)                                        

 

             RED BLOOD CELL COUNT (BEAKER) 5.28 M/ L    4.63-6.08               

  



             (test code = 761)                                        

 

             HEMOGLOBIN (BEAKER) (test code = 14.6 GM/DL   13.7-17.5            

     



             410)                                                

 

             HEMATOCRIT (BEAKER) (test code = 44.9 %       40.1-51.0            

     



             411)                                                

 

             MEAN CORPUSCULAR VOLUME (BEAKER) 85.0 fL      79.0-92.2            

     



             (test code = 753)                                        

 

             MEAN CORPUSCULAR HEMOGLOBIN 27.7 pg      25.7-32.2                 



             (BEAKER) (test code = 751)                                        

 

             MEAN CORPUSCULAR HEMOGLOBIN CONC 32.5 GM/DL   32.3-36.5            

     



             (BEAKER) (test code = 752)                                        

 

             RED CELL DISTRIBUTION WIDTH 15.3 %       11.6-14.4    H            



             (BEAKER) (test code = 412)                                        

 

             PLATELET COUNT (BEAKER) (test 300 K/CU MM  150-450                 

  



             code = 756)                                         

 

             MEAN PLATELET VOLUME (BEAKER) 10.4 fL      9.4-12.4                

  



             (test code = 754)                                        

 

             NUCLEATED RED BLOOD CELLS 0 /100 WBC   0-0                       



             (BEAKER) (test code = 413)                                        



(MANUAL DIFFERENTIAL)2020 08:48:00





             Test Item    Value        Reference Range Interpretation Comments

 

             NEUTROPHILS - REL (DIFF) (BEAKER) 69 %                             

      



             (test code = 1359)                                        

 

             LYMPHOCYTES - REL (DIFF) (BEAKER) 25 %                             

      



             (test code = 1360)                                        

 

             MONOCYTES - REL (DIFF) (BEAKER) 3 %                                

    



             (test code = 1361)                                        

 

             EOSINOPHILS - REL (DIFF) (BEAKER) 3 %                              

      



             (test code = 1362)                                        

 

             BASOPHILS - REL (DIFF) (BEAKER) 0 %                                

    



             (test code = 1363)                                        

 

             NEUTROPHILS - ABS (DIFF) (BEAKER) 4.62 K/ L    1.80-8.00           

      



             (test code = 1365)                                        

 

             LYMPHOCYTES - ABS (DIFF) (BEAKER) 1.68 K/ L    1.48-4.50           

      



             (test code = 1366)                                        

 

             MONOCYTES - ABS (DIFF) (BEAKER) 0.20 K/ L    0.00-1.30             

    



             (test code = 1367)                                        

 

             EOSINOPHILS - ABS (DIFF) (BEAKER) 0.20 K/ L    0.00-0.50           

      



             (test code = 1368)                                        

 

             BASOPHILS - ABS (DIFF) (BEAKER) 0.00 K/ L    0.00-0.20             

    



             (test code = 1369)                                        

 

             TOTAL COUNTED (BEAKER) (test code = 100                            

        



             1351)                                               

 

             PLT MORPHOLOGY (BEAKER) (test code Normal                          

       



             = 486)                                              

 

             RBC MORPHOLOGY (BEAKER) (test code Normal                          

       



             = 762)                                              

 

             SMUDGE CELLS (BEAKER) (test code = Present                         

       



             1371)                                               



HEMOGLOBIN [id number] 06:39:00





             Test Item    Value        Reference Range Interpretation Comments

 

             HEMOGLOBIN A1C (BEAKER) (test code = 10.5 %       4.3-6.1      H   

         



             368)                                                



LIPID [id number] 04:57:00





             Test Item    Value        Reference Range Interpretation Comments

 

             TRIGLYCERIDES (BEAKER) 1251 mg/dL                             Speci

men moderately



             (test code = 540)                                        hemolyzed

 

             CHOLESTEROL (BEAKER) 215 mg/dL                              Specime

n moderately



             (test code = 631)                                        hemolyzed

 

             HDL CHOLESTEROL 22 mg/dL                               



             (BEAKER) (test code =                                        



             976)                                                



Calculated LDL not valid if triglyceride &gt;400 mg/dLTriglyceride Reference 
Range:   Low Risk  &lt;150   Borderline    150-199   High Risk     200-499   
Very High Risk  &gt;=500Cholesterol Reference Range:   Low Risk         &lt;200 
 Borderline    200-239    High Risk        &gt;240HDL Cholesterol Reference 
Range:   Low Risk         &gt;=60   High Risk         &lt;40LDL Cholesterol 
ReferenceRange:   Optimal          &lt;100   Near Optimal  100-129   Borderline 
  130-159   High          160-189   Very High       &gt;=190   Specimen 
moderately lipemicBASIC METABOLIC [id number] 04:56:00





             Test Item    Value        Reference Range Interpretation Comments

 

             SODIUM (BEAKER) 137 meq/L    136-145                   



             (test code = 381)                                        

 

             POTASSIUM (BEAKER) 4.6 meq/L    3.5-5.1                   Specimen 

moderately



             (test code = 379)                                        hemolyzed

 

             CHLORIDE (BEAKER) 106 meq/L                        



             (test code = 382)                                        

 

             CO2 (BEAKER) (test 20 meq/L     22-29        L            



             code = 355)                                         

 

             BLOOD UREA NITROGEN 12 mg/dL     7-21                      



             (BEAKER) (test code                                        



             = 354)                                              

 

             CREATININE (BEAKER) 0.95 mg/dL   0.57-1.25                 Specimen

 moderately



             (test code = 358)                                        hemolyzed

 

             GLUCOSE RANDOM 398 mg/dL           H            



             (BEAKER) (test code                                        



             = 652)                                              

 

             CALCIUM (BEAKER) 8.8 mg/dL    8.4-10.2                  



             (test code = 697)                                        

 

             EGFR (BEAKER) (test 110 mL/min/1.73                           ESTIM

ATED GFR IS



             code = 1092) sq m                                   NOT AS ACCURATE

 AS



                                                                 CREATININE



                                                                 CLEARANCE IN



                                                                 PREDICTING



                                                                 GLOMERULAR



                                                                 FILTRATION RATE

.



                                                                 ESTIMATED GFR I

S



                                                                 NOT APPLICABLE 

FOR



                                                                 DIALYSIS PATIEN

TS.



POCT-GLUCOSE [id number] 21:53:00





             Test Item    Value        Reference Range Interpretation Comments

 

             POC-GLUCOSE METER > mg/dL             HH           : Notified

 RN/MD: TESTED



             (BEAKER) (test code =                                        AT [facility] [address] [facility]



             2238)                                               [place], [address]

[address]:



                                                                 /Techni

chritsina ID =



                                                                 [id number] for ZECHARIAH TRACY



U/S, ABDOMINAL, [id number] 19:54:00Reason for exam:-&gt;Evaluation of  
shunt and for possible cyst or pseudocyst Should this be performed at the 
bedside?-&gt;YesFINAL REPORT PATIENT ID:   [id number] Limited abdominal 
ultrasound. CLINICAL HISTORY: Evaluation of VPshunt for possible cyst or 
pseudocyst. COMPARISON STUDY: None available. FINDINGS: Sonographic assessment 
of the abdomen was performed assessing for fluid in the region of the patient's 
shunts. No fluid collections are seen. However, the study is limited by the 
patient's body habitus. CT scan would bemore sensitive. Signed: Brandyn Hend[place]one[place] Verified Date/Time:  2020 19:54:10 Reading Location: [address]
Consult Reading Room      Electronically signed by: BRANDYN HENDRICKSON M.D. on 
2020 07:54 PMPOCT-GLUCOSE [id number] 19:34:00





             Test Item    Value        Reference Range Interpretation Comments

 

             POC-GLUCOSE METER 474 mg/dL           HH           : Notified

 RN/MD:



             (BEAKER) (test code =                                        TESTED

 AT [facility] [address]



             1538)                                               [facility],



                                                                 [address]:



                                                                 /Techni

christina ID



                                                                 = [id number] for [place]

SENAITFAMILIA



                                                                 LONA



URINALYSIS W/ REFLEX URINE [id number] 17:48:00





             Test Item    Value        Reference Range Interpretation Comments

 

             COLOR (BEAKER) (test code = 470) Yellow                            

     

 

             CLARITY (BEAKER) (test code = Hazy                                 

  



             469)                                                

 

             SPECIFIC GRAVITY UA (BEAKER) 1.020        1.001-1.035              

 



             (test code = 468)                                        

 

             PH UA (BEAKER) (test code = 467) 6.0          5.0-8.0              

     

 

             PROTEIN UA (BEAKER) (test code = 20 mg/dL     Negative     A       

     



             464)                                                

 

             GLUCOSE UA (BEAKER) (test code = >1000 mg/dL  Negative     A       

     



             365)                                                

 

             KETONES UA (BEAKER) (test code = 40 mg/dL     Negative     A       

     



             371)                                                

 

             BILIRUBIN UA (BEAKER) (test code Negative     Negative             

     



             = 462)                                              

 

             BLOOD UA (BEAKER) (test code = Moderate     Negative     A         

   



             461)                                                

 

             NITRITE UA (BEAKER) (test code = Positive     Negative     A       

     



             465)                                                

 

             LEUKOCYTE ESTERASE UA (BEAKER) Large        Negative     A         

   



             (test code = 466)                                        

 

             UROBILINOGEN UA (BEAKER) (test 0.2 mg/dL    0.2-1.0                

   



             code = 463)                                         

 

             RBC UA (BEAKER) (test code = 519) 0 /HPF                           

      

 

             WBC UA (BEAKER) (test code = 520) 267 /HPF                         

      

 

             BACTERIA (BEAKER) (test code = Moderate                            

   



             517)                                                

 

             SOURCE(BEAKER) (test code = 2795)                                  

      



CBC W/PLT COUNT &amp; AUTO [id number] 17:38:00





             Test Item    Value        Reference Range Interpretation Comments

 

             WHITE BLOOD CELL COUNT (BEAKER) 7.8 K/ L     3.5-10.5              

    



             (test code = 775)                                        

 

             RED BLOOD CELL COUNT (BEAKER) 5.12 M/ L    4.63-6.08               

  



             (test code = 761)                                        

 

             HEMOGLOBIN (BEAKER) (test code = 14.7 GM/DL   13.7-17.5            

     



             410)                                                

 

             HEMATOCRIT (BEAKER) (test code = 43.3 %       40.1-51.0            

     



             411)                                                

 

             MEAN CORPUSCULAR VOLUME (BEAKER) 84.6 fL      79.0-92.2            

     



             (test code = 753)                                        

 

             MEAN CORPUSCULAR HEMOGLOBIN 28.7 pg      25.7-32.2                 



             (BEAKER) (test code = 751)                                        

 

             MEAN CORPUSCULAR HEMOGLOBIN CONC 33.9 GM/DL   32.3-36.5            

     



             (BEAKER) (test code = 752)                                        

 

             RED CELL DISTRIBUTION WIDTH 15.3 %       11.6-14.4    H            



             (BEAKER) (test code = 412)                                        

 

             PLATELET COUNT (BEAKER) (test 344 K/CU MM  150-450                 

  



             code = 756)                                         

 

             MEAN PLATELET VOLUME (BEAKER) 11.0 fL      9.4-12.4                

  



             (test code = 754)                                        

 

             NUCLEATED RED BLOOD CELLS 0 /100 WBC   0-0                       



             (BEAKER) (test code = 413)                                        



(CELLAVISION MANUAL DIFF)2020 17:38:00





             Test Item    Value        Reference Range Interpretation Comments

 

             NEUTROPHILS - REL 63 %                                   



             (CELLAVISION)(BEAKER) (test code                                   

     



             = 2816)                                             

 

             LYMPHOCYTES - REL 23 %                                   



             (CELLAVISION)(BEAKER) (test code                                   

     



             = 2817)                                             

 

             MONOCYTES - REL 6 %                                    



             (CELLAVISION)(BEAKER) (test code                                   

     



             = 2818)                                             

 

             EOSINOPHILS - REL 5 %                                    



             (CELLAVISION)(BEAKER) (test code                                   

     



             = 2819)                                             

 

             BASOPHILS - REL 1 %                                    



             (CELLAVISION)(BEAKER) (test code                                   

     



             = 2820)                                             

 

             BANDS - REL (CELLAVISION)(BEAKER) 2 %          0-10                

      



             (test code = 2826)                                        

 

             NEUTROPHILS - ABS 4.91 K/ul    1.78-5.38                 



             (CELLAVISION)(BEAKER) (test code                                   

     



             = 2830)                                             

 

             LYMPHOCYTES - ABS 1.79 K/ul    1.32-3.57                 



             (CELLAVISION)(BEAKER) (test code                                   

     



             = 2831)                                             

 

             MONOCYTES - ABS 0.47 K/uL    0.30-0.82                 



             (CELLAVISION)(BEAKER) (test code                                   

     



             = 2832)                                             

 

             EOSINOPHILS - ABS 0.39 K/uL    0.04-0.54                 



             (CELLAVISION)(BEAKER) (test code                                   

     



             = 2834)                                             

 

             BASOPHILS - ABS 0.08 K/uL    0.01-0.08                 



             (CELLAVISION)(BEAKER) (test code                                   

     



             = 2835)                                             

 

             BANDS - ABS (CELLAVISION)(BEAKER) 0.16 K/uL    0.00-0.80           

      



             (test code = 2840)                                        

 

             TOTAL COUNTED (BEAKER) (test code 100                              

      



             = 1351)                                             

 

             SMUDGE CELLS (BEAKER) (test code Present                           

     



             = 1371)                                             

 

             GIANT PLATELETS (BEAKER) (test Present                             

   



             code = 313)                                         

 

             ANISOCYTOSIS (BEAKER) (test code 1+ few                            

     



             = 961)                                              

 

             POIKILOCYTES (BEAKER) (test code 2+ moderate                       

     



             = 966)                                              

 

             SPHEROCYTES (BEAKER) (test code = 1+ few                           

      



             768)                                                

 

             OVALOCYTES (BEAKER) (test code = 1+ few                            

     



             477)                                                

 

             TEAR DROP CELLS (BEAKER) (test 1+ few                              

   



             code = 481)                                         

 

             ARTIFACT (CELLAVISION)(BEAKER) Present                             

   



             (test code = 3432)                                        

 

             PLATELET CONCENTRATION Adequate                               



             (CELLAVISION)(BEAKER) (test code                                   

     



             = 3438)                                             



Received comment: User comments: Slide comments:POCT-GLUCOSE [id number] 
16:27:00





             Test Item    Value        Reference Range Interpretation Comments

 

             POC-GLUCOSE METER 424 mg/dL           HH           : Notified

 RN/MD:



             (KUN) (test code =                                        TESTED

 AT [facility] [id number])                                               [facility],



                                                                 [id number]:



                                                                 /Techni

christina ID



                                                                 = [id number] for [place]

SENAITFAMILIA



                                                                 LONA



CT, BRAIN, WITHOUT [id number] 15:31:00FINAL REPORT PATIENT ID:   
[id number] CT, BRAIN, WITHOUT CONTRAST CLINICAL INDICATION:  Hydrocephalus C
OMPARISON: None TECHNIQUE:  Noncontrast axial CT imaging of the brain and skull.
 DOSE REDUCTION: Dose modulation, iterative reconstruction, and/or weight-based 
adjustment of the mA/kV was utilized to reduce the radiation dose to as low as 
reasonably achievable. FINDINGS:A total of two ventricular drainage catheters 
are present. A right transverse temporal catheter terminates across the midline 
just above the level of the foramen of Monro. A right parietal approach catheter
terminates in the pineal region. Supratentorial ventricular configuration is 
slitlike. There is no herniation. The basilar cisterns are preserved. There is 
no identifiable recent infarct. 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 Calvarial configuration escape of 
cephalic. Thereis no acute osseous abnormality.  IMPRESSION: Chronic, likely 
congenital parenchymal changes withoutrecent infarct or hemorrhage. A total of 
two ventricular drainage catheters are present. Supratentorial ventricular 
configuration is slitlike and may represent over shunting. Correlation 
recommended. Signed: JR Rae Robert MDReport Verified Date/Time:  
2020 15:31:08 Reading Location: [address] Neuro Reading Room    
Electronically signed by: ALONDRA RAE on 2020 03:31 PMRAD, 
SHUNT [id number] 15:13:00Reason for exam:-&gt;concern for VPS malfunction
FINAL REPORT PATIENT ID:   [id number] RAD, SHUNT SERIES INDICATION: concern for 
VPS malfunction COMPARISON: None TECHNIQUE: AP and lateral radiographs of the 
skull, abdomen and chest were acquired to evaluate shunt catheter FINDINGS:An 
abandoned shunt catheter is present within the right neck. Medial tothis a 
second shunt catheter is present which descends along the left chest wall, 
terminating withinthe mid pelvis. Radiopaque portions of the catheter appear 
contiguous. Signed: Colby Tan Verified Date/Time:  2020 
15:13:54 Reading Location: [facility] Radiology Reading Room  Electronically 
signed by: COLBY TAN MD on 2020 03:13 PMPOCT-GLUCOSE METER
2020 11:38:00





             Test Item    Value        Reference Range Interpretation Comments

 

             POC-GLUCOSE METER 394 mg/dL           H            : TESTED A

T [facility] [address]



             (BEAKER) (test code =                                        [place],



             [address]38)                                               [id number]:



                                                                 /Techni

christina ID



                                                                 = [id number] for [place]

INSONU,



                                                                 LONA



HEMOGLOBIN [id number] 09:15:00





             Test Item    Value        Reference Range Interpretation Comments

 

             HEMOGLOBIN A1C (BEAKER) (test code = 10.4 %       4.3-6.1      H   

         



             368)                                                



TSH/FREE T4 IF [id number] 07:54:00





             Test Item    Value        Reference Range Interpretation Comments

 

             THYROID STIMULATING HORMONE 1.40 uIU/mL  0.35-4.94                 



             (BEAKER) (test code = 772)                                        



POCT-GLUCOSE [id number] 07:48:00





             Test Item    Value        Reference Range Interpretation Comments

 

             POC-GLUCOSE METER > mg/dL             HH           : Notified

 RN/MD: TESTED



             (BEAKER) (test code =                                        AT [facility] [address] [facility]



             [address])                                               [facility], [facility]

30:



                                                                 /Techni

christina ID =



                                                                 [id number] for LONA GOLDSMITH



BASIC METABOLIC [id number] 07:44:00





             Test Item    Value        Reference Range Interpretation Comments

 

             SODIUM (BEAKER) 134 meq/L    136-145      L            



             (test code = 381)                                        

 

             POTASSIUM (BEAKER) 4.4 meq/L    3.5-5.1                   



             (test code = 379)                                        

 

             CHLORIDE (BEAKER) 103 meq/L                        



             (test code = 382)                                        

 

             CO2 (BEAKER) (test 20 meq/L     22-29        L            



             code = 355)                                         

 

             BLOOD UREA NITROGEN 17 mg/dL     7-21                      



             (BEAKER) (test code                                        



             = 354)                                              

 

             CREATININE (BEAKER) 1.09 mg/dL   0.57-1.25                 



             (test code = 358)                                        

 

             GLUCOSE RANDOM 464 mg/dL           HH           



             (BEAKER) (test code                                        



             = 652)                                              

 

             CALCIUM (BEAKER) 8.6 mg/dL    8.4-10.2                  



             (test code = 697)                                        

 

             EGFR (BEAKER) (test 94 mL/min/1.73                           ESTIMA

MANJEET GFR IS



             code = 1092) sq m                                   NOT AS ACCURATE

 AS



                                                                 CREATININE



                                                                 CLEARANCE IN



                                                                 PREDICTING



                                                                 GLOMERULAR



                                                                 FILTRATION RATE

.



                                                                 ESTIMATED GFR I

S



                                                                 NOT APPLICABLE 

FOR



                                                                 DIALYSIS PATIEN

TS.



LIPID [id number] 07:34:00





             Test Item    Value        Reference Range Interpretation Comments

 

             TRIGLYCERIDES (BEAKER) (test code = 750 mg/dL                      

        



             540)                                                

 

             CHOLESTEROL (BEAKER) (test code = 196 mg/dL                        

      



             631)                                                

 

             HDL CHOLESTEROL (BEAKER) (test code 22 mg/dL                       

        



             = 976)                                              



Calculated LDL not valid if triglyceride &gt;400 mg/dLTriglyceride Reference 
Range:   Low Risk  &lt;150   Borderline    150-199   High Risk     200-499   
Very High Risk  &gt;=500Cholesterol Reference Range:   Low Risk         &lt;200 
 Borderline    200-239    High Risk        &gt;240HDL Cholesterol Reference 
Range:   Low Risk         &gt;=60   High Risk         &lt;40LDL Cholesterol 
ReferenceRange:   Optimal          &lt;100   Near Optimal  100-129   Borderline 
  130-159   High          160-189   Very High       &gt;=190POCT-GLUCOSE METER
2020 00:49:00





             Test Item    Value        Reference Range Interpretation Comments

 

             POC-GLUCOSE METER 399 mg/dL           H            : TESTED A

T [facility] [address]



             ([employer]) (test code =                                        [place],



             [address])                                               [id number]:



                                                                 /Techni

christina ID



                                                                 = [id number] for CLAY BATRES



RAD, FOOT, 2 VIEWS, [id number] 22:28:00Reason for exam:-&gt;osteomyletitis
FINAL REPORT PATIENT ID:   [id number] TECHNIQUE: Two views each of the bilateral 
feet HISTORY: osteomyletitis. COMPARISON: None. IMPRESSION:No acute displaced 
fracture or dislocation. Joint spaces are within normal limits.Severe soft 
tissue swelling.On the right subcentimeter nonspecific calcifications adjacent 
to the heel plantar aspect. Correlate with physical exam. Severely limited exam 
due to patient body habitus. No definite cortical irregularity.There is clinical
concern for osteomyelitis, recommend MRI with contrast. Signed: Patricia Townsend 
MDReport Verified Date/Time:  2020 22:28:25 Reading Location: [facility] C0[facility]
Consult Reading Room  Electronically signed by: PATRICIA TOWNSEND DO on 
2020 10:28 PMRAD, FOOT, 2 VIEWS, [id number] 22:28:00Reason for 
exam:-&gt;osteomyletitsFINAL REPORT PATIENT ID:   [id number] TECHNIQUE: Two views 
each of the bilateral feet HISTORY: osteomyletitis. COMPARISON: None. 
IMPRESSION:No acute displaced fracture or dislocation. Joint spaces are within 
normal limits.Severe soft tissue swelling.On the right subcentimeter nonspecific
calcifications adjacent to the heel plantar aspect. Correlate with physical 
exam. Severely limited exam due to patient body habitus. No definite cortical 
irregularity.There is clinical concern for osteomyelitis, recommend MRI with 
contrast. Signed: Patricia Townsend MDReport Verified Date/Time:  2020 
22:28:25 Reading Location: [facility] C013W Consult Reading Room  Electronically 
signed by: PATRICIA TOWNSEND DO on 2020 10:28 PMPOCT-GLUCOSE METER
2020 21:04:00





             Test Item    Value        Reference Range Interpretation Comments

 

             POC-GLUCOSE METER 430 mg/dL           HH           : Notified

 RN/MD:



             (KUN) (test code =                                        TESTED

 AT [facility] [address]



             [address])                                               [facility],



                                                                 [id number]:



                                                                 /Techni

christina ID



                                                                 = [id number] for DEYSI ADRIAN



[facility]EM[facility]:PT:ISOLATE:ORDQN:[id number] 16:48:00[facility]isMem[facility] 
HermannURINE AND [id number] 16:48:00Negative (18 10:48 AM)[facility] [id number] 16:48:00Negative *NA*(18 10:48 AM)[facility] [id number] 16:48:00Negative *NA*(18 10:48 AM)[facility] [id number] 16:48:00Trace *ABN*(18 10:48 AM)[facility] [id number] 16:48:000.2[facility] STOOL
2018 16:48:00Large *ABN*(18 10:48 AM)[facility] STOOL
2018 16:48:00Negative (18 10:48 AM)[facility] STOOL
2018 16:48:00Negative (18 10:48 AM)[facility] STOOL
2018 16:48:00





             Test Item    Value        Reference Range Interpretation Comments

 

             UA pH (test code = UA pH) 7.0 1        5.0-8.0                   



[facility] [id number] 16:48:00





             Test Item    Value        Reference Range Interpretation Comments

 

             UA Spec Grav (test code = UA Spec 1.015 1                          

      



             Grav)                                               



[facility] [id number] 16:48:00Yellow *NA*(18 10:48 AM)
[facility] [id number] 16:48:00Clear (18 10:48 AM)
[facility] [id number] 16:48:00None Seen (18 10:48 AM)
[facility] BANK [id number] 11:57:00Negative (18 5:57 AM)
[facility] H[id number] 11:57:00





             Test Item    Value        Reference Range Interpretation Comments

 

             PTT (test code = PTT) 33.4 s       22.9-35.8                 



[facility] Hos[id number] 11:57:00





             Test Item    Value        Reference Range Interpretation Comments

 

             PT (test code = PT) 13.7 s       12.0-14.7                 



[facility] H[id number] 11:57:00





             Test Item    Value        Reference Range Interpretation Comments

 

             INR (test code = INR) 1.05 1       0.85-1.17                 



[facility] HermannCHEM [id number] 10:50:35765Lajbroum HermannCHEM PANEL
2018 10:50:019.4[facility]annCHEM [id number] 10:50:0128[facility] 
HermannCHEM [id number] 10:50:0114[facility] HermannCHEM [id number] 
10:50:0189[facility] HermannCHEM [id number] 10:50:38610Xynflwxl HermannCHEM 
[id number] 10:50:014.2[facility] HermannCHEM [id number] 10:50:47148
[facility] HermannCHEM [id number] 10:50:010.85Mem[facility] HermannCHEM PANEL
2018 10:50:019.2[facility] BhymsvuAPWQWDYCQI0237-72-64 10:50:01





             Test Item    Value        Reference Range Interpretation Comments

 

             ACT (TEG) Rapid (test code = ACT (TEG) 136 s                 

           



             Rapid)                                              



[facility]orial [id number] 10:50:01





             Test Item    Value        Reference Range Interpretation Comments

 

             Split Point Rapid (test code = Split 0.6 min                       

         



             Point Rapid)                                        



[facility] [id number] 10:50:01





             Test Item    Value        Reference Range Interpretation Comments

 

             R-time Rapid (test code = R-time 0.9 min      0.4-0.7              

     



             Rapid)                                              



[facility] [id number] 10:50:01





             Test Item    Value        Reference Range Interpretation Comments

 

             K-time Rapid (test code = K-time 1.4 min      0.6-2.3              

     



             Rapid)                                              



[facility] [id number] 10:50:01





             Test Item    Value        Reference Range Interpretation Comments

 

             Angle Rapid (test code = Angle 71 degrees   64-80                  

   



             Rapid)                                              



[facility] ClubTouhhanYYVMLAMTPM9610-77-84 10:50:0112.7[facility]annHEMATOLOGY
2018 10:50:01





             Test Item    Value        Reference Range Interpretation Comments

 

             Max Amplitude Rapid (test code = Max 72 mm        52-71            

         



             Amplitude Rapid)                                        



[facility] ClubGdrydnqZNACJYSVUY3124-89-77 10:50:010.1[facility]annHEMATOLOGY
2018 10:50:50729Xcysvhdu RlhgcdeTVGDXNJNMC5561-83-29 10:50:017.8[facility] 
RyymujrONUTHBFZDG2714-91-43 10:50:01





             Test Item    Value        Reference Range Interpretation Comments

 

             MCH (test code = MCH) 27.4 pg      27.0-31.0                 



[facility] YjqccfgEMUVYYUHER4308-12-31 10:50:0180.7[facility]annHEMATOLOGY
2018 10:50:0134.0[facility] QusxjidDPTMAZQZHR2569-61-62 10:50:0118.9[facility]
QaxezlhFQOEEMYQBT2615-47-60 10:50:0143.3[facility] TmnyfgbWZXYUJNGHK1135-38-01 
10:50:019.3[facility] JodazcmROBDLPIOBW0155-97-36 10:50:0114.7[facility]
UGJYPSUHGA9400-98-77 10:50:015.36[facility] AukrvyfHAVGEWOCVO7453-25-09 10:50:01
0.2[facility] NapjtnoICUHGXHKNY3348-59-05 10:50:010.1[facility]annHEMATOLOGY
2018 10:50:011.8[facility] JnqvpgsPJQXWVSTDG4311-00-13 10:50:010.9[facility] 
JbidocbRKPPFANEKD1733-92-19 10:50:016.3[facility] ArnrdypJQLXPYVSXN9218-92-39 
10:50:012.0[facility] HjmpwbzCZBKOHIPRX3293-47-86 10:50:0167.4[facility]
FOMRZOZRVV6547-86-07 10:50:0119.9[facility] MtcetmtIOSSOHVGPA4975-66-66 10:50:01
1.0[facility] YmwviofYSETFTUYYB0767-00-03 10:50:019.7[facility]annCHEM PANEL
2018 05:42:00





             Test Item    Value        Reference Range Interpretation Comments

 

             B/C Ratio (test code = B/C Ratio) 17 1         6-25                

      



[facility]annCHEM BDSFN7630-33-58 05:42:004.3[facility] HermannCHEM PANEL
2018 05:42:00





             Test Item    Value        Reference Range Interpretation Comments

 

             A/G Ratio (test code = A/G Ratio) 0.7 1        0.7-1.6             

      



[facility] HermannCHEM AGBHX3716-82-40 05:42:0014.4[facility]annCHEM PANEL
2018 05:42:53514Nrnzliva HermannCHEM OAULC3609-70-62 05:42:0076[facility] 
HermannCHEM CJGLO6775-50-26 05:42:0035[facility]annCHEM AWDXG6462-24-09 
05:42:002.8[facility] HermannCHEM DAIDE1672-89-83 05:42:007.1[facility]annCHEM 
ORRLU3371-64-75 05:42:008.7[facility] HermannCHEM EXTCH4122-87-46 05:42:0018
[facility]annCHEM VOPYJ4605-13-04 05:42:000.3[facility]annCHEM PANEL
2018 05:42:004.4[facility] HermannCHEM ETXAM6964-95-41 05:42:59587Wvydpjhs 
HermannCHEM HTDPY4871-84-62 05:42:0023[facility]annCHEM WIPIF7830-34-10 
05:42:67979Gjbhkzii HermannCHEM LMUHL1472-34-16 05:42:001.01[facility] HermannCHEM
UZEGS8298-56-01 05:42:0017[facility]annCHEM WVJWN0871-74-04 05:42:82403
[facility] OmpfgbjRDQNYYDPWL7256-26-56 05:42:000.6[facility]annHEMATOLOGY
2018 05:42:004.8[facility] XobugbwJUAACOYHSK4405-97-86 05:42:000.7[facility] 
OurohxoBWJHTONMKU5450-61-46 05:42:001.9[facility] RgdypebZWXZPPFRKY6438-93-76 
05:42:009.4[facility] XdmdpyoLFMVQRGLBV6598-76-94 05:42:000.2[facility]
RULMLBEQIH6515-55-64 05:42:002.9[facility] TltfwskDDCSNWWDEG3957-23-30 05:42:00
62.2[facility] LotepmaYVOMYMKWUR1987-71-77 05:42:0024.9[facility]annHEMATOLOGY
2018 05:42:00





             Test Item    Value        Reference Range Interpretation Comments

 

             MCH (test code = MCH) 27.5 pg      27.0-31.0                 



[facility] QakfmtwYQTPKUHRMB7173-38-38 05:42:0085.3[facility]annHEMATOLOGY
2018 05:42:0043.9[facility] WagcqoxSTZELUJASO8302-46-78 05:42:0014.2[facility]
PclzrvlMGBOYTCYCH2893-74-27 05:42:007.7[facility] VcstepcQMWOEQOQCL9716-29-48 
05:42:005.15[facility] OxypjvyVFEWQKDIZA4623-65-21 05:42:008.4[facility]
PEGQNAGODR7979-75-66 05:42:0032.3[facility] WbkpjoaGEJIBAXBYS5161-46-41 05:42:00
17.3[facility] SkpecsgLJSJTHNTQC8229-65-38 05:42:50701Ypsjjery HermannCHEM PANEL
2018 09:36:004.4[facility] HermannCHEM PWNNG3274-20-13 09:36:00





             Test Item    Value        Reference Range Interpretation Comments

 

             A/G Ratio (test code = A/G Ratio) 0.6 1        0.7-1.6             

      



[facility]annCHEM GZSKW6192-69-14 09:36:00





             Test Item    Value        Reference Range Interpretation Comments

 

             B/C Ratio (test code = B/C Ratio) 17 1         6-25                

      



[facility] HermannCHEM BQVIG5320-36-69 09:36:0011.3[facility] HermannCHEM PANEL
2018 09:36:59994Ihdlmzen HermannCHEM FHANH2357-92-35 09:36:000.84[facility] 
HermannCHEM WAPML2045-05-81 09:36:27674Btweyirf HermannCHEM RBKFJ8481-39-23 
09:36:0099[facility]annCHEM LSKAY8724-24-29 09:36:0014[facility]annCHEM 
UONXR0433-46-74 09:36:0079[facility]annCHEM UXEFE1393-75-85 09:36:000.3
[facility] HermannCHEM XRVMK4672-14-06 09:36:0014[facility]annCHEM PANEL
2018 09:36:0043[facility]annCHEM TEJZR7841-15-18 09:36:007.1[facility] 
HermannCHEM BRPVG0569-33-95 09:36:002.7[facility] HermannCHEM MXKNR7891-34-23 
09:36:009.2[facility] HermannCHEM WTOLY2788-31-94 09:36:0021[facility]annCHEM 
RLJSO3765-40-50 09:36:004.3[facility] HermannCHEM SXVZV5172-09-64 09:36:78154
[facility] IexireuOIGNUUQHGI3762-20-18 09:36:0032.5[facility]annHEMATOLOGY
2018 09:36:0017.5[facility] BmoiottSDGDMOQCYP3654-90-98 09:36:87640Ftdkefbm 
PcqibduOGCFFIXUML2005-37-77 09:36:008.5[facility] JqpiogsVFUOBSWREK1168-66-83 
09:36:006.6[facility] UnqaiamOMPCWOJCXW1755-00-65 09:36:005.17[facility]
PCYNWAJVYL1202-60-23 09:36:0086.1[facility] OqmaqrxDCQPOGIQZG6399-35-25 09:36:00
44.5[facility] NusuqkxHJEQXOZMMM3294-39-80 09:36:00





             Test Item    Value        Reference Range Interpretation Comments

 

             MCH (test code = MCH) 28.0 pg      27.0-31.0                 



[facility] BcilfbaCCANCVISOZ6860-49-10 09:36:0014.5[facility]annHEMATOLOGY
2018 09:36:001.7[facility] DycekigJHUEHWBJUZ5035-47-27 09:36:000.7[facility] 
AotawubCGDOLPFVDH1534-65-86 09:36:000.2[facility] JxuatxkBFJTEOGJZH7637-35-83 
09:36:0026.1[facility] EscenzsIOXYEEDELR0678-38-79 09:36:0059.3[facility]
JMEPBHBBCV6576-51-73 09:36:000.8[facility] VqniygfQQIPOSYXUK4447-97-45 09:36:00
10.5[facility] PlyhwhdUWIVNGREMV6063-65-57 09:36:003.3[facility]annHEMATOLOGY
2018 09:36:003.9[facility] GwuzbpuHKWJQCVSIJ0848-11-39 21:02:009.1[facility] 
HermannCHEM IHLYL8758-52-85 07:07:0074[facility] HermannCHEM EMFXA3294-57-62 
07:07:0012[facility]annCHEM ASDOU3089-15-22 07:07:000.75[facility] HermannCHEM 
RIONY8625-71-03 07:07:46131Ewknsjne HermannCHEM QYWHU5065-10-44 07:07:002.9
[facility]annCHEM HNFFD8298-08-90 07:07:004.4[facility] HermannCHEM PANEL
2018 07:07:00





             Test Item    Value        Reference Range Interpretation Comments

 

             A/G Ratio (test code = A/G Ratio) 0.7 1        0.7-1.6             

      



[facility] HermannCHEM NODZW7013-38-13 07:07:000.4[facility]annCHEM PANEL
2018 07:07:0071[facility] HermannCHEM TMEMN5905-67-58 07:07:0015[facility] 
HermannCHEM SUAFV1814-93-57 07:07:0028[facility]annCHEM JCQJR8618-41-77 
07:07:004.4[facility] HermannCHEM YPRDF0665-07-17 07:07:33321Qezwdavk HermannCHEM 
LDNAE9374-00-10 07:07:0025[facility]annCHEM GUJNF6143-93-19 07:07:66589
[facility] HermannCHEM LIVAH3736-97-67 07:07:00





             Test Item    Value        Reference Range Interpretation Comments

 

             B/C Ratio (test code = B/C Ratio) 16 1         6-25                

      



[facility] HermannCHEM POICB0359-37-17 07:07:008.7[facility] HermannCHEM PANEL
2018 07:07:0012.4[facility] HermannCHEM RXITK9166-48-37 07:07:007.3[facility] 
JttuzobEFDGBGKLYM1431-28-11 07:07:68030Gjpnjals CholowsKVMCLMIZNC6893-25-50 
07:07:008.7[facility] IretbzmLYSCGSUVXQ7822-59-14 07:07:006.9[facility] [place]
UIEVZXQOBH5244-81-03 07:07:005.30[facility] FssmqmrTVNZYUFPUK1583-79-48 07:07:00
32.8[facility] JigwmdaRUPEDKWFFG5203-95-08 07:07:00





             Test Item    Value        Reference Range Interpretation Comments

 

             MCH (test code = MCH) 27.9 pg      27.0-31.0                 



[facility] IjovvreUTRUJMAHNF9764-64-70 07:07:0014.8[facility] HermannHEMATOLOGY
2018 07:07:0085.0[facility] PmujlnkGCOLSBKDLE4291-43-61 07:07:0045.1[facility]
MeeymgwBQYVHTTSOF0148-42-14 07:07:0017.5[facility] EhcrhzbDNZQNMZDBO2374-43-46 
07:07:000.2[facility] XnurdygIHFDDVLHHI5089-27-55 07:07:002.0[facility]
MCWURNHZNV8526-47-11 07:07:000.7[facility] CvfxxavXCJDLIZVKM5180-91-88 07:07:002.4
[facility] TlugsxqGRHWTVKXBZ6832-40-73 07:07:000.6[facility]annHEMATOLOGY
2018 07:07:004.0[facility] CckaqraJPBHKFPCVA7008-13-48 07:07:0010.4[facility] 
OulhvyoZEPIVMAXVD0259-15-86 07:07:00Normal (18 2:07 AM)[facility] Jose
EJFUPZABLB8236-37-80 07:07:0058.1[facility] AuciyubRSMFLGCDIE7794-19-10 07:07:00
Normal (18 2:07 AM)[facility] PwlolqeSBRWQOKMLS2617-16-11 07:07:0028.5[facility]
UyfhhvwGXQIVIGKPX9825-12-41 16:07:000.1[facility] QnvvznqVYLFGSEOYN1633-08-47 
16:07:00Moderate *ABN*(18 11:07 AM)[facility] CgtonjsFFGUOBTVFH6982-60-08 
06:43:0022.3[facility] HermannCHEM ZLYRI1288-73-62 11:45:002.3[facility] HermannCHEM
ZHSEK7920-61-99 11:45:003.5[facility] NzgollqUTOVLFYQNX4885-77-17 11:45:00





             Test Item    Value        Reference Range Interpretation Comments

 

             PT (test code = PT) 14.0 s       12.0-14.7                 



[facility] LcpjlbbCUUTJSDGYJ3877-64-89 11:45:00





             Test Item    Value        Reference Range Interpretation Comments

 

             PTT (test code = PTT) 37.6 s       22.9-35.8                 



[facility] ZotcsrvXRSFKTWKXW2070-05-83 11:45:00





             Test Item    Value        Reference Range Interpretation Comments

 

             INR (test code = INR) 1.08 1       0.85-1.17                 



[facility] UzsyryeFUZJCOLWMG8816-80-45 11:45:0013.1[facility] HermannIMMUNOLOGY
2018 11:45:0025.2[facility] HermannCHEM VHRUJ1199-04-42 03:06:000.9[facility] 
RiyjxaxHXJZSALRCN2565-69-11 03:06:005[facility] JyhskqnDWIDIGRGAL4719-72-10 
03:06:0015.8[facility] TgoptyuUMFBCRZGFI6141-60-82 00:44:00





             Test Item    Value        Reference Range Interpretation Comments

 

             PT (test code = PT) 13.0 s       12.0-14.7                 



[facility] CtksjfeXXJUQMPJWA2710-93-65 00:44:00





             Test Item    Value        Reference Range Interpretation Comments

 

             INR (test code = INR) 0.98 1       0.85-1.17                 



[facility] PeoqwqcQRNCHVZZWX2992-48-35 00:44:00





             Test Item    Value        Reference Range Interpretation Comments

 

             PTT (test code = PTT) 33.2 s       22.9-35.8                 



[facility] PQPWXRX7389-31-67 23:51:00Negative (5/3/18 6:51 PM)
[facility]annUR[facility] AND HEWMX8336-19-99 23:35:000.2[facility]annUR[facility] AND 
ELBYX8883-12-48 23:35:00Negative (5/3/18 6:35 PM)[facility] STOOL
2018 23:35:00Negative (5/3/18 6:35 PM)[facility] STOOL
2018 23:35:00Negative *NA*(5/3/18 6:35 PM)[facility] ST[facility]
2018 23:35:00Negative *NA*(5/3/18 6:35 PM)[facility] STOOL
2018 23:35:00Trace *ABN*(5/3/18 6:35 PM)[facility] STOOL
2018 23:35:00Small *ABN*(5/3/18 6:35 PM)[facility] STOOL
2018 23:35:00





             Test Item    Value        Reference Range Interpretation Comments

 

             UA Spec Grav (test code = UA Spec 1.020 1                          

      



             Grav)                                               



[facility] CJMKJ7026-60-53 23:35:00





             Test Item    Value        Reference Range Interpretation Comments

 

             UA pH (test code = UA pH) 6.0 1        5.0-8.0                   



[facility] BLIXP3381-72-33 23:35:00Yellow *NA*(5/3/18 6:35 PM)
[facility] CJNOR7647-42-18 23:35:00Trace *ABN*(5/3/18 6:35 PM)
[facility] DCRLG2001-78-23 23:35:00Slight Cloudy (5/3/18 6:35 PM)
[facility] HKWYL8944-24-26 23:35:000-2 (5/3/18 6:35 PM)[facility] 
VvlcacmDZHUBOWIDB7603-76-10 23:20:000.1[facility] PhauipmFOKNYPYKGV7007-55-31 
23:20:00Normal (5/3/18 6:20 PM)[facility] DOQADCY2431-87-55 16:22:00





             Test Item    Value        Reference Range Interpretation Comments

 

             CULTURE (BEAKER) (test No growth in 5 days                         

  



             code = 1095)                                        



BLOOD GFFRGYY0906-78-34 16:22:00





             Test Item    Value        Reference Range Interpretation Comments

 

             CULTURE (BEAKER) (test No growth in 5 days                         

  



             code = 1095)                                        



(MANUAL DIFFERENTIAL)2017 22:29:00





             Test Item    Value        Reference Range Interpretation Comments

 

             TOTAL COUNTED (BEAKER) (test code =                                

        



             1351)                                               

 

             WBC MORPHOLOGY (BEAKER) (test code = Normal                        

         



             487)                                                

 

             PLT MORPHOLOGY (BEAKER) (test code = Normal                        

         



             486)                                                

 

             RBC MORPHOLOGY (BEAKER) (test code = Normal                        

         



             762)                                                



CBC W/PLT COUNT &amp; AUTO DKXMLQOMXOAQ7688-23-67 22:28:00





             Test Item    Value        Reference Range Interpretation Comments

 

             WHITE BLOOD CELL COUNT (BEAKER) 7.3 K/ L     4.0-10.0              

    



             (test code = 775)                                        

 

             RED BLOOD CELL COUNT (BEAKER) 5.09 M/ L    4.20-5.80               

  



             (test code = 761)                                        

 

             HEMOGLOBIN (BEAKER) (test code = 13.0 GM/DL   13.0-16.8            

     



             410)                                                

 

             HEMATOCRIT (BEAKER) (test code = 42.3 %       40.0-50.0            

     



             411)                                                

 

             MEAN CORPUSCULAR VOLUME (BEAKER) 83.0 fL      82.0-98.0            

     



             (test code = 753)                                        

 

             MEAN CORPUSCULAR HEMOGLOBIN 25.6 pg      27.0-33.0    L            



             (BEAKER) (test code = 751)                                        

 

             MEAN CORPUSCULAR HEMOGLOBIN CONC 30.8 GM/DL   32.0-36.0    L       

     



             (BEAKER) (test code = 752)                                        

 

             RED CELL DISTRIBUTION WIDTH 18.0 %       10.3-14.2    H            



             (BEAKER) (test code = 412)                                        

 

             PLATELET COUNT (BEAKER) (test 331 K/CU MM  150-430                 

  



             code = 756)                                         

 

             MEAN PLATELET VOLUME (BEAKER) 8.5 fL       6.5-10.5                

  



             (test code = 754)                                        

 

             NUCLEATED RED BLOOD CELLS 0 /100 WBC   0-0                       



             (BEAKER) (test code = 413)                                        

 

             NEUTROPHILS RELATIVE PERCENT 65 %                                  

 



             (BEAKER) (test code = 429)                                        

 

             LYMPHOCYTES RELATIVE PERCENT 23 %                                  

 



             (BEAKER) (test code = 430)                                        

 

             MONOCYTES RELATIVE PERCENT 8 %                                    



             (BEAKER) (test code = 431)                                        

 

             EOSINOPHILS RELATIVE PERCENT 3 %                                   

 



             (BEAKER) (test code = 432)                                        

 

             BASOPHILS RELATIVE PERCENT 1 %                                    



             (BEAKER) (test code = 437)                                        

 

             NEUTROPHILS ABSOLUTE COUNT 4.75 K/ L    1.80-8.00                 



             (BEAKER) (test code = 670)                                        

 

             LYMPHOCYTES ABSOLUTE COUNT 1.68 K/ L    1.48-4.50                 



             (BEAKER) (test code = 414)                                        

 

             MONOCYTES ABSOLUTE COUNT (BEAKER) 0.55 K/ L    0.00-1.30           

      



             (test code = 415)                                        

 

             EOSINOPHILS ABSOLUTE COUNT 0.24 K/ L    0.00-0.50                 



             (BEAKER) (test code = 416)                                        

 

             BASOPHILS ABSOLUTE COUNT (BEAKER) 0.05 K/ L    0.00-0.20           

      



             (test code = 417)                                        



0.000.520.000.000.000.00BASI METABOLIC RZKLL5783-23-74 11:11:00





             Test Item    Value        Reference Range Interpretation Comments

 

             SODIUM (BEAKER) 138 meq/L    136-145                   



             (test code = 381)                                        

 

             POTASSIUM (BEAKER) 4.0 meq/L    3.5-5.1                   



             (test code = 379)                                        

 

             CHLORIDE (BEAKER) 108 meq/L           H            



             (test code = 382)                                        

 

             CO2 (BEAKER) (test 23 meq/L     22-29                     



             code = 355)                                         

 

             BLOOD UREA NITROGEN 11 mg/dL     7-21                      



             (BEAKER) (test code                                        



             = 354)                                              

 

             CREATININE (BEAKER) 0.74 mg/dL   0.57-1.25                 



             (test code = 358)                                        

 

             GLUCOSE RANDOM 124 mg/dL           H            



             (BEAKER) (test code                                        



             = 652)                                              

 

             CALCIUM (BEAKER) 8.9 mg/dL    8.4-10.2                  



             (test code = 697)                                        

 

             EGFR (BEAKER) (test 150 mL/min/1.73                           ESTIM

ATED GFR IS



             code = 1092) sq m                                   NOT AS ACCURATE

 AS



                                                                 CREATININE



                                                                 CLEARANCE IN



                                                                 PREDICTING



                                                                 GLOMERULAR



                                                                 FILTRATION RATE

.



                                                                 ESTIMATED GFR I

S



                                                                 NOT APPLICABLE 

FOR



                                                                 DIALYSIS PATIEN

TS.



URINE [id number] 09:56:00





             Test Item    Value        Reference Range Interpretation Comments

 

             CULTURE (BEAKER) (test <10,000 col/mL skin                         

  



             code = 1095) jaime                                  



COMPREHENSIVE METABOLIC [id number] 08:49:00





             Test Item    Value        Reference Range Interpretation Comments

 

             TOTAL PROTEIN 7.3 gm/dL    6.0-8.3                   



             (BEAKER) (test code =                                        



             770)                                                

 

             ALBUMIN (BEAKER) 3.3 g/dL     3.5-5.0      L            



             (test code = 1145)                                        

 

             ALKALINE PHOSPHATASE 89 U/L                           



             (BEAKER) (test code =                                        



             346)                                                

 

             BILIRUBIN TOTAL 1.4 mg/dL    0.2-1.2      H            



             (BEAKER) (test code =                                        



             377)                                                

 

             SODIUM (BEAKER) (test 137 meq/L    136-145                   



             code = 381)                                         

 

             POTASSIUM (BEAKER) 3.7 meq/L    3.5-5.1                   



             (test code = 379)                                        

 

             CHLORIDE (BEAKER) 108 meq/L           H            



             (test code = 382)                                        

 

             CO2 (BEAKER) (test 17 meq/L     22-29        L            



             code = 355)                                         

 

             BLOOD UREA NITROGEN 12 mg/dL     7-21                      



             (BEAKER) (test code =                                        



             354)                                                

 

             CREATININE (BEAKER) 0.78 mg/dL   0.57-1.25                 



             (test code = 358)                                        

 

             GLUCOSE RANDOM 119 mg/dL           H            



             (BEAKER) (test code =                                        



             652)                                                

 

             CALCIUM (BEAKER) 8.6 mg/dL    8.4-10.2                  



             (test code = 697)                                        

 

             AST (SGOT) (BEAKER) 13 U/L       5-34                      



             (test code = 353)                                        

 

             ALT (SGPT) (BEAKER) 28 U/L       6-55                      



             (test code = 347)                                        

 

             EGFR (BEAKER) (test 141                                    ESTIMATE

D GFR IS



             code = 1092) mL/min/1.73 sq                           NOT AS ACCURA

TE AS



                          m                                      CREATININE



                                                                 CLEARANCE IN



                                                                 PREDICTING



                                                                 GLOMERULAR



                                                                 FILTRATION RATE

.



                                                                 ESTIMATED GFR I

S



                                                                 NOT APPLICABLE 

FOR



                                                                 DIALYSIS PATIEN

TS.



CBC W/PLT COUNT &amp; AUTO [id number] 08:46:00





             Test Item    Value        Reference Range Interpretation Comments

 

             WHITE BLOOD CELL COUNT (BEAKER) 9.4 K/ L     4.0-10.0              

    



             (test code = 775)                                        

 

             RED BLOOD CELL COUNT (BEAKER) 4.79 M/ L    4.20-5.80               

  



             (test code = 761)                                        

 

             HEMOGLOBIN (BEAKER) (test code = 12.8 GM/DL   13.0-16.8    L       

     



             410)                                                

 

             HEMATOCRIT (BEAKER) (test code = 40.0 %       40.0-50.0            

     



             411)                                                

 

             MEAN CORPUSCULAR VOLUME (BEAKER) 83.4 fL      82.0-98.0            

     



             (test code = 753)                                        

 

             MEAN CORPUSCULAR HEMOGLOBIN 26.7 pg      27.0-33.0    L            



             (BEAKER) (test code = 751)                                        

 

             MEAN CORPUSCULAR HEMOGLOBIN CONC 32.0 GM/DL   32.0-36.0            

     



             (BEAKER) (test code = 752)                                        

 

             RED CELL DISTRIBUTION WIDTH 18.2 %       10.3-14.2    H            



             (BEAKER) (test code = 412)                                        

 

             PLATELET COUNT (BEAKER) (test 313 K/CU MM  150-430                 

  



             code = 756)                                         

 

             MEAN PLATELET VOLUME (BEAKER) 8.6 fL       6.5-10.5                

  



             (test code = 754)                                        

 

             NUCLEATED RED BLOOD CELLS 0 /100 WBC   0-0                       



             (BEAKER) (test code = 413)                                        

 

             NEUTROPHILS RELATIVE PERCENT 70 %                                  

 



             (BEAKER) (test code = 429)                                        

 

             LYMPHOCYTES RELATIVE PERCENT 16 %                                  

 



             (BEAKER) (test code = 430)                                        

 

             MONOCYTES RELATIVE PERCENT 12 %                                   



             (BEAKER) (test code = 431)                                        

 

             EOSINOPHILS RELATIVE PERCENT 1 %                                   

 



             (BEAKER) (test code = 432)                                        

 

             BASOPHILS RELATIVE PERCENT 1 %                                    



             (BEAKER) (test code = 437)                                        

 

             NEUTROPHILS ABSOLUTE COUNT 6.54 K/ L    1.80-8.00                 



             (BEAKER) (test code = 670)                                        

 

             LYMPHOCYTES ABSOLUTE COUNT 1.50 K/ L    1.48-4.50                 



             (BEAKER) (test code = 414)                                        

 

             MONOCYTES ABSOLUTE COUNT (BEAKER) 1.13 K/ L    0.00-1.30           

      



             (test code = 415)                                        

 

             EOSINOPHILS ABSOLUTE COUNT 0.13 K/ L    0.00-0.50                 



             (BEAKER) (test code = 416)                                        

 

             BASOPHILS ABSOLUTE COUNT (BEAKER) 0.06 K/ L    0.00-0.20           

      



             (test code = 417)                                        



0.00URINALYSIS W/ [id number] 20:36:00





             Test Item    Value        Reference Range Interpretation Comments

 

             COLOR (BEAKER) (test code = 470) Yellow                            

     

 

             CLARITY (BEAKER) (test code = 469) Hazy                            

       

 

             SPECIFIC GRAVITY UA (BEAKER) (test 1.012        1.001-1.035        

       



             code = 468)                                         

 

             PH UA (BEAKER) (test code = 467) 5.5          5.0-8.0              

     

 

             PROTEIN UA (BEAKER) (test code = 100 mg/dL    Negative     A       

     



             464)                                                

 

             GLUCOSE UA (BEAKER) (test code = Negative     Negative             

     



             365)                                                

 

             KETONES UA (BEAKER) (test code = Negative     Negative             

     



             371)                                                

 

             BILIRUBIN UA (BEAKER) (test code = Negative     Negative           

       



             462)                                                

 

             BLOOD UA (BEAKER) (test code = 461) Moderate     Negative     A    

        

 

             NITRITE UA (BEAKER) (test code = Negative     Negative             

     



             465)                                                

 

             LEUKOCYTE ESTERASE UA (BEAKER) Large        Negative     A         

   



             (test code = 466)                                        

 

             UROBILINOGEN UA (BEAKER) (test code 3.0 mg/dL    0.2-1.0      H    

        



             = 463)                                              

 

             RBC UA (BEAKER) (test code = 519) 19 /HPF                          

      

 

             WBC UA (BEAKER) (test code = 520) 182 /HPF                         

      

 

             SOURCE(BEAKER) (test code = 1641)                                  

      



BASIC METABOLIC [id number] 17:00:00





             Test Item    Value        Reference Range Interpretation Comments

 

             SODIUM (BEAKER) 140 meq/L    136-145                   



             (test code = 381)                                        

 

             POTASSIUM (BEAKER) 3.7 meq/L    3.5-5.1                   



             (test code = 379)                                        

 

             CHLORIDE (BEAKER) 112 meq/L           H            



             (test code = 382)                                        

 

             CO2 (BEAKER) (test 17 meq/L     22-29        L            



             code = 355)                                         

 

             BLOOD UREA NITROGEN 23 mg/dL     7-21         H            



             (BEAKER) (test code                                        



             = 354)                                              

 

             CREATININE (BEAKER) 1.00 mg/dL   0.57-1.25                 



             (test code = 358)                                        

 

             GLUCOSE RANDOM 102 mg/dL                        



             (BEAKER) (test code                                        



             = 652)                                              

 

             CALCIUM (BEAKER) 8.7 mg/dL    8.4-10.2                  



             (test code = 697)                                        

 

             EGFR (BEAKER) (test 106 mL/min/1.73                           ESTIM

ATED GFR IS



             code = 1092) sq m                                   NOT AS ACCURATE

 AS



                                                                 CREATININE



                                                                 CLEARANCE IN



                                                                 PREDICTING



                                                                 GLOMERULAR



                                                                 FILTRATION RATE

.



                                                                 ESTIMATED GFR I

S



                                                                 NOT APPLICABLE 

FOR



                                                                 DIALYSIS PATIEN

TS.



Specimen slightly ictericCBC W/PLT COUNT &amp; AUTO [id number] 
12:18:00





             Test Item    Value        Reference Range Interpretation Comments

 

             WHITE BLOOD CELL COUNT (BEAKER) 16.4 K/ L    4.0-10.0     H        

    



             (test code = 775)                                        

 

             RED BLOOD CELL COUNT (BEAKER) 5.22 M/ L    4.20-5.80               

  



             (test code = 761)                                        

 

             HEMOGLOBIN (BEAKER) (test code = 14.4 GM/DL   13.0-16.8            

     



             410)                                                

 

             HEMATOCRIT (BEAKER) (test code = 42.9 %       40.0-50.0            

     



             411)                                                

 

             MEAN CORPUSCULAR VOLUME (BEAKER) 82.2 fL      82.0-98.0            

     



             (test code = 753)                                        

 

             MEAN CORPUSCULAR HEMOGLOBIN 27.5 pg      27.0-33.0                 



             (BEAKER) (test code = 751)                                        

 

             MEAN CORPUSCULAR HEMOGLOBIN CONC 33.5 GM/DL   32.0-36.0            

     



             (BEAKER) (test code = 752)                                        

 

             RED CELL DISTRIBUTION WIDTH 16.2 %       10.3-14.2    H            



             (BEAKER) (test code = 412)                                        

 

             PLATELET COUNT (BEAKER) (test 329 K/CU MM  150-430                 

  



             code = 756)                                         

 

             MEAN PLATELET VOLUME (BEAKER) 8.2 fL       6.5-10.5                

  



             (test code = 754)                                        

 

             NUCLEATED RED BLOOD CELLS 0 /100 WBC   0-0                       



             (BEAKER) (test code = 413)                                        

 

             NEUTROPHILS RELATIVE PERCENT 83 %                                  

 



             (BEAKER) (test code = 429)                                        

 

             LYMPHOCYTES RELATIVE PERCENT 7 %                                   

 



             (BEAKER) (test code = 430)                                        

 

             MONOCYTES RELATIVE PERCENT 9 %                                    



             (BEAKER) (test code = 431)                                        

 

             EOSINOPHILS RELATIVE PERCENT 0 %                                   

 



             (BEAKER) (test code = 432)                                        

 

             BASOPHILS RELATIVE PERCENT 0 %                                    



             (BEAKER) (test code = 437)                                        

 

             NEUTROPHILS ABSOLUTE COUNT 1.62 K/ L    1.80-8.00    L            



             (BEAKER) (test code = 670)                                        

 

             LYMPHOCYTES ABSOLUTE COUNT 1.15 K/ L    1.48-4.50    L            



             (BEAKER) (test code = 414)                                        

 

             MONOCYTES ABSOLUTE COUNT (BEAKER) 1.56 K/ L    0.00-1.30    H      

      



             (test code = 415)                                        

 

             EOSINOPHILS ABSOLUTE COUNT 0.01 K/ L    0.00-0.50                 



             (BEAKER) (test code = 416)                                        

 

             BASOPHILS ABSOLUTE COUNT (BEAKER) 0.02 K/ L    0.00-0.20           

      



             (test code = 417)                                        



(MANUAL DIFFERENTIAL)2017 12:18:00





             Test Item    Value        Reference Range Interpretation Comments

 

             TOTAL COUNTED (BEAKER) (test code =                                

        



             1351)                                               

 

             WBC MORPHOLOGY (BEAKER) (test code = Normal                        

         



             487)                                                

 

             PLT MORPHOLOGY (BEAKER) (test code = Normal                        

         



             486)                                                

 

             RBC MORPHOLOGY (BEAKER) (test code = Normal                        

         



             762)

## 2021-08-23 NOTE — EDPHYS
Physician Documentation                                                                           

 [facility]                                                                 

Name: Redd Dale Jr                                                                            

Age: 35 yrs                                                                                       

Sex: Male                                                                                         

: 1985                                                                                   

MRN: [id number]                                                                                   

Arrival Date: 2021                                                                          

Time: 15:27                                                                                       

Account#: [id number]                                                                            

Bed 30                                                                                            

Private MD:                                                                                       

ED Physician Marc Ness                                                                     

HPI:                                                                                              

                                                                                             

23:37 This 35 yrs old Black Male presents to ER via Stret[facility] with complaints of pain.        tw4 

23:37 The patient presents with abdominal pain in the periumbilical area. Onset: The          tw4 

      symptoms/episode began/occurred today. The symptoms do not radiate. Associated signs        

      and symptoms: none. The symptoms are described as sharp. Modifying factors: The             

      symptoms are alleviated by nothing, the symptoms are aggravated by movement. Severity       

      of pain: At its worst the pain was moderate in the emergency department the pain is         

      unchanged.                                                                                  

                                                                                                  

Historical:                                                                                       

- Allergies:                                                                                      

15:41 Zofran;                                                                                 kg  

15:41 Vancomycin;                                                                             kg  

15:41 TRIMETHOPRIM;                                                                           kg  

15:41 Toradol;                                                                                kg  

15:41 PENICILLINS;                                                                            kg  

15:41 Morphine;                                                                               kg  

15:41 Levofloxacin;                                                                           kg  

15:41 Doxycycline;                                                                            kg  

15:41 Demerol;                                                                                kg  

15:41 CLAVULANIC ACID;                                                                        kg  

15:41 Ciprofloxacin;                                                                          kg  

15:41 Bactrim;                                                                                kg  

15:41 Amoxicillin;                                                                            kg  

- PMHx:                                                                                           

15:41 spina bifida; Hypertension; Hydrocephalus; cluster headaches; GERD; decubitus ulcers on kg  

      feet; Cerebral Palsy; Asthma;                                                               

                                                                                                  

                                                                                                  

                                                                                                  

ROS:                                                                                              

23:37 Constitutional: Negative for fever, chills, and weight loss, Eyes: Negative for injury, tw4 

      pain, redness, and discharge, Cardiovascular: Negative for chest pain, palpitations,        

      and edema, Respiratory: Negative for shortness of breath, cough, wheezing, and              

      pleuritic chest pain, Back: Negative for injury and pain, MS/Extremity: Negative for        

      injury and deformity, Skin: Negative for injury, rash, and discoloration, Neuro:            

      Negative for headache, weakness, numbness, tingling, and seizure.                           

23:37 Abdomen/GI: Positive for abdominal pain, Negative for nausea and vomiting, nausea,          

      vomiting, and diarrhea, nausea, vomiting, abdominal cramps, abdominal distension,           

      anorexia, dysphagia, hematemesis, black/tarry stool, rectal pain, rectal bleeding.          

                                                                                                  

Exam:                                                                                             

23:37 Constitutional:  This is a well developed, well nourished patient who is awake, alert,  tw4 

      and in no acute distress. Head/Face:  Normocephalic, atraumatic. Chest/axilla:  Normal      

      chest wall appearance and motion.  Nontender with no deformity.  No lesions are             

      appreciated. Cardiovascular:  Regular rate and rhythm with a normal S1 and S2.  No          

      gallops, murmurs, or rubs.  Normal PMI, no JVD.  No pulse deficits. Respiratory:  Lungs     

      have equal breath sounds bilaterally, clear to auscultation and percussion.  No rales,      

      rhonchi or wheezes noted.  No increased work of breathing, no retractions or nasal          

      flaring. Back:  No spinal tenderness.  No costovertebral tenderness.  Full range of         

      motion. Skin:  Warm, dry with normal turgor.  Normal color with no rashes, no lesions,      

      and no evidence of cellulitis. MS/ Extremity:  Pulses equal, no cyanosis.                   

      Neurovascular intact.  Full, normal range of motion. Neuro:  Awake and alert, GCS 15,       

      oriented to person, place, time, and situation.  Cranial nerves II-XII grossly intact.      

      Motor strength 5/5 in all extremities.  Sensory grossly intact.  Cerebellar exam            

      normal.  Normal gait.                                                                       

23:37 Abdomen/GI: Inspection: abdomen appears normal, Bowel sounds: normal, Palpation:            

      moderate abdominal tenderness, in the suprapubic area.                                      

                                                                                                  

Vital Signs:                                                                                      

17:30  / 94; Pulse 89; Resp 20; Temp 98.7; Pulse Ox 96% ; Pain 10/10;                   kg  

                                                                                                  

MDM:                                                                                              

19:20 Patient medically screened.                                                             tw4 

23:37 Differential diagnosis: acute coronary syndrome, appendicitis. Data reviewed: vital     tw4 

      signs, nurses notes. Data interpreted: Pulse oximetry: Interpretation: normal.              

      Counseling: I had a detailed discussion with the patient and/or guardian regarding: the     

      historical points, exam findings, and any diagnostic results supporting the                 

      discharge/admit diagnosis. Special discussion: I discussed with the patient/guardian in     

      detail that at this point there is no indication for admission to the hospital. It is       

      understood, however, that if the symptoms persist or worsen the patient needs to return     

      immediately for re-evaluation.                                                              

23:41 Data reviewed: lab test result(s), cardiac enzymes, CBC, electrolytes, hepatic panel,   tw4 

      radiologic studies, CT scan.                                                                

                                                                                                  

                                                                                             

19:46 Order name: Basic Metabolic Panel; Complete Time: 21:42                                 [facility] 

                                                                                             

21:42 Interpretation: Normal except: .                                                                                                                                               

19:46 Order name: CBC with Diff; Complete Time: 21:42                                         [facility] 

                                                                                             

21:43 Interpretation: MCV 90.3; MCH 29.8; RDW 15.8.                                                                                                                                        

19:46 Order name: Hepatic Function; Complete Time: 21:42                                      [facility] 

                                                                                             

21:43 Interpretation: Normal except: ; ; A/G 0.7; GLOB 4.7; ALB 3.1.                                                                                                         

19:46 Order name: Lipase; Complete Time: 21:42                                                [facility] 

                                                                                             

21:43 Interpretation: Normal except: LIP 60.                                                                                                                                               

19:46 Order name: CT Abd/Pelvis - IV Contrast Only                                                                                                                                         

19:50 Order name: Urine Microscopic Only; Complete Time: 21:42                                [facility] 

                                                                                             

21:43 Interpretation: Normal except: UBACT LOADED; URBC 5-10; UWBC TNTC.                                                                                                                   

19:46 Order name: IV Saline Lock; Complete Time: 19:55                                         

                                                                                             

19:46 Order name: Labs collected and sent; Complete Time: 19:55                               [facility] 

                                                                                             

19:50 Order name: Urine Dipstick-Ancillary (obtain specimen); Complete Time: 21:15            [facility] 

                                                                                             

20:16 Order name: Labs - recollect needed: all labs; Complete Time: 21:15                     mw2 

                                                                                                  

Administered Medications:                                                                         

20:00 Drug: fentaNYL (PF) 50 mcg Route: IVP; Site: right forearm;                             bs2 

21:00 Follow up: Response: No adverse reaction; Pain is unchanged, physician notified         bs2 

                                                                                                  

                                                                                                  

Disposition Summary:                                                                              

21 22:56                                                                                    

Discharge Ordered                                                                                 

      Location: Home                                                                          tw4 

      Problem: new                                                                            tw4 

      Symptoms: have improved                                                                 tw4 

      Condition: Stable                                                                       tw4 

      Diagnosis                                                                                   

        - Constipation                                                                        tw4 

        - Abdominal pain, Generalized                                                         tw4 

      Followup:                                                                               tw4 

        - With: Private Physician                                                                  

        - When: Upon discharge from the Emergency Department                                       

        - Reason: Recheck today's complaints, Continuance of care, Re-evaluation by your           

      physician                                                                                   

      Discharge Instructions:                                                                     

        - Discharge Summary Sheet                                                             tw4 

        - Abdominal Pain, Adult                                                               tw4 

        - Colic                                                                               tw4 

        - Constipation, Adult                                                                 tw4 

      Forms:                                                                                      

        - Medication Reconciliation Form                                                      tw4 

        - Thank You Letter                                                                    tw4 

        - Antibiotic Education                                                                tw4 

        - Prescription Opioid Use                                                             tw4 

      Prescriptions:                                                                              

        - Miralax 17 gram Oral powder in packet                                                    

            - take 1 packet by ORAL route once daily; 1 packet; Refills: 0, Product Selection tw4 

      Permitted                                                                                   

Signatures:                                                                                       

Dispatcher [employer]                           Marc Harris MD MD   tw4                                                  

Po Bronson                            mw2                                                  

Lizzeth Elmore RN                     RN   kg                                                   

Kerri Diaz RN                      RN   bs2                                                  

                                                                                                  

**************************************************************************************************

## 2021-08-24 VITALS — SYSTOLIC BLOOD PRESSURE: 140 MMHG | OXYGEN SATURATION: 96 % | DIASTOLIC BLOOD PRESSURE: 94 MMHG | TEMPERATURE: 98.7 F

## 2021-08-24 NOTE — RAD REPORT
EXAM DESCRIPTION:  CT - Abdomen   Pelvis W Contrast - 8/24/2021 6:37 am

 

CLINICAL HISTORY:  Mid to lower abdominal pain since last night.

 

COMPARISON:  None.

 

TECHNIQUE:  Axial CT imaging of the abdomen and pelvis performed with intravenous contrast. Reformatt
ed coronal and sagittal images reviewed.

A dose reduction technique was utilized with automated exposure control according to patient size.

 

FINDINGS:  Clear lung bases. Heart is normal in size.

The liver is normal in size and contour. Mild decreased attenuation due to fatty infiltration. Gallbl
adder has been resected. Normal spleen, pancreas, adrenal glands. Unremarkable right and left kidney.
 Normal aorta and inferior vena cava caliber. No retroperitoneal lymphadenopathy. Mesenteric vessels 
appear normal.

Unremarkable stomach. The small bowel loops are normal caliber. Appendix is normal in the right lower
 quadrant. Large volume of stool throughout the colon without obstruction. No ascites or free air. No
 mesenteric adenopathy. There are 3 ventriculoperitoneal catheters within the abdomen. One terminates
 in the right abdomen just right of midline. The second terminates in the anterior pelvis. The third 
terminates in the right subhepatic space.

There is a suprapubic bladder catheter. Bladder is decompressed. Normal prostate. No pelvic free flui
d. 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 Hyperlordosis of the lumb
osacral junction. Intact bony pelvis. There is bilateral acetabular broadening with deformity of the 
right and left humeral head due to chronic subluxations. Possible wound along the posterior right scr
otal sac. Injection granulomas in the right and left buttock. There are mildly enlarged bilateral erik
in lymph nodes up to 1.5 cm in short axis on the right side and 1.4 cm on the left side. There are mi
ldly enlarged bilateral iliac chain lymph nodes. Largest is on the left side, 1.1 cm.

 

IMPRESSION:  1. Constipation. No obstruction.

2. Mild hepatic steatosis.

3. Suprapubic catheter is within a decompressed bladder.

4. Mild bilateral iliac and groin lymphadenopathy.

5. Lumbosacral spinal dysraphism. Chronic bilateral hip deformities due to mild subluxations

6. Possible right posterior scrotal wound. Correlate with physical exam.

 

Electronically signed by:   Bing Bal DO   8/23/2021 10:32 PM CDT Workstation: [id number]

 

 

Due to temporary technical issues with the PACS/Fluency reporting system, reports are being signed by
 the in house radiologists without review as a courtesy to insure prompt reporting. The interpreting 
radiologist is fully responsible for the content of the report.

## 2021-11-02 ENCOUNTER — HOSPITAL ENCOUNTER (INPATIENT)
Dept: HOSPITAL 97 - ER | Age: 36
LOS: 2 days | Discharge: HOME HEALTH | DRG: 690 | End: 2021-11-04
Attending: INTERNAL MEDICINE | Admitting: INTERNAL MEDICINE
Payer: COMMERCIAL

## 2021-11-02 VITALS — BODY MASS INDEX: 66.2 KG/M2

## 2021-11-02 DIAGNOSIS — N39.0: Primary | ICD-10-CM

## 2021-11-02 DIAGNOSIS — E11.9: ICD-10-CM

## 2021-11-02 DIAGNOSIS — G82.20: ICD-10-CM

## 2021-11-02 DIAGNOSIS — Z88.2: ICD-10-CM

## 2021-11-02 DIAGNOSIS — I10: ICD-10-CM

## 2021-11-02 DIAGNOSIS — Z20.822: ICD-10-CM

## 2021-11-02 DIAGNOSIS — Q05.4: ICD-10-CM

## 2021-11-02 DIAGNOSIS — Z88.0: ICD-10-CM

## 2021-11-02 LAB
ALBUMIN SERPL BCP-MCNC: 3.3 G/DL (ref 3.4–5)
ALP SERPL-CCNC: 108 U/L (ref 45–117)
ALT SERPL W P-5'-P-CCNC: 41 U/L (ref 12–78)
AST SERPL W P-5'-P-CCNC: 13 U/L (ref 15–37)
BUN BLD-MCNC: 11 MG/DL (ref 7–18)
GLUCOSE SERPLBLD-MCNC: 82 MG/DL (ref 74–106)
HCT VFR BLD CALC: 50.1 % (ref 39.6–49)
LIPASE SERPL-CCNC: 53 U/L (ref 73–393)
LYMPHOCYTES # SPEC AUTO: 2.1 K/UL (ref 0.7–4.9)
PMV BLD: 8.2 FL (ref 7.6–11.3)
POTASSIUM SERPL-SCNC: 3.7 MMOL/L (ref 3.5–5.1)
RBC # BLD: 5.69 M/UL (ref 4.33–5.43)

## 2021-11-02 PROCEDURE — 96366 THER/PROPH/DIAG IV INF ADDON: CPT

## 2021-11-02 PROCEDURE — 99285 EMERGENCY DEPT VISIT HI MDM: CPT

## 2021-11-02 PROCEDURE — 80076 HEPATIC FUNCTION PANEL: CPT

## 2021-11-02 PROCEDURE — 80053 COMPREHEN METABOLIC PANEL: CPT

## 2021-11-02 PROCEDURE — 74177 CT ABD & PELVIS W/CONTRAST: CPT

## 2021-11-02 PROCEDURE — 36415 COLL VENOUS BLD VENIPUNCTURE: CPT

## 2021-11-02 PROCEDURE — 80048 BASIC METABOLIC PNL TOTAL CA: CPT

## 2021-11-02 PROCEDURE — 87040 BLOOD CULTURE FOR BACTERIA: CPT

## 2021-11-02 PROCEDURE — 85025 COMPLETE CBC W/AUTO DIFF WBC: CPT

## 2021-11-02 PROCEDURE — 87086 URINE CULTURE/COLONY COUNT: CPT

## 2021-11-02 PROCEDURE — 96365 THER/PROPH/DIAG IV INF INIT: CPT

## 2021-11-02 PROCEDURE — 81003 URINALYSIS AUTO W/O SCOPE: CPT

## 2021-11-02 PROCEDURE — 96361 HYDRATE IV INFUSION ADD-ON: CPT

## 2021-11-02 PROCEDURE — 83605 ASSAY OF LACTIC ACID: CPT

## 2021-11-02 PROCEDURE — 96375 TX/PRO/DX INJ NEW DRUG ADDON: CPT

## 2021-11-02 PROCEDURE — 87186 SC STD MICRODIL/AGAR DIL: CPT

## 2021-11-02 PROCEDURE — 87077 CULTURE AEROBIC IDENTIFY: CPT

## 2021-11-02 PROCEDURE — 82947 ASSAY GLUCOSE BLOOD QUANT: CPT

## 2021-11-02 PROCEDURE — 94760 N-INVAS EAR/PLS OXIMETRY 1: CPT

## 2021-11-02 PROCEDURE — 94640 AIRWAY INHALATION TREATMENT: CPT

## 2021-11-02 PROCEDURE — 93005 ELECTROCARDIOGRAM TRACING: CPT

## 2021-11-02 PROCEDURE — 87205 SMEAR GRAM STAIN: CPT

## 2021-11-02 PROCEDURE — 83690 ASSAY OF LIPASE: CPT

## 2021-11-02 PROCEDURE — 87088 URINE BACTERIA CULTURE: CPT

## 2021-11-02 NOTE — ER
Nurse's Notes                                                                                     

 [facility]                                                                 

Name: Redd Dale Jr                                                                            

Age: 35 yrs                                                                                       

Sex: Male                                                                                         

: 1985                                                                                   

MRN: [id number]                                                                                   

Arrival Date: 2021                                                                          

Time: 18:43                                                                                       

Account#: [id number]                                                                            

Bed 7                                                                                             

Private MD:                                                                                       

Diagnosis: Pyelonephritis;Nausea with vomiting, unspecified;Dehydration                           

                                                                                                  

Presentation:                                                                                     

                                                                                             

18:47 Chief complaint: Patient states: pt c/o r sided flank pain that started last night and  as6 

      nausea, pt also states he has open wounds to his bottom and scrotal area. Coronavirus       

      screen: Vaccine status: Patient reports being unvaccinated. At this time, the client        

      does not indicate any symptoms associated with coronavirus-19. Ebola Screen: Patient        

      negative for fever greater than or equal to 101.5 degrees Fahrenheit, and additional        

      compatible Ebola Virus Disease symptoms Patient denies exposure to infectious person.       

      Patient denies travel to an Ebola-affected area in the 21 days before illness onset.        

      Initial Sepsis Screen: Does the patient meet any 2 criteria? Systolic BP < 90 mmHg. HR      

      > 90 bpm. Does the patient have a suspected source of infection? No. Patient's initial      

      sepsis screen is negative. Risk Assessment: Do you want to hurt yourself or someone         

      else? Patient reports no desire to harm self or others. Onset of symptoms was 2021.                                                                                    

18:47 Method Of Arrival: EMS: [facility]                                                as6 

18:47 Acuity: AYESHA 3                                                                           as6 

19:35 Care prior to arrival: Medication(s) given: 25 mg Benadryl 250 mcg fentanyl IV          as6 

      initiated. 22 GA, in the right forearm.                                                     

                                                                                                  

Triage Assessment:                                                                                

18:51 General: Appears in no apparent distress. comfortable, Behavior is calm, cooperative.   as6 

      Pain: Complains of pain in right flank.                                                     

                                                                                                  

Historical:                                                                                       

- Allergies:                                                                                      

18:51 Toradol;                                                                                as6 

18:51 Morphine;                                                                               as6 

18:51 Doxycycline;                                                                            as6 

18:51 Ciprofloxacin;                                                                          as6 

18:51 Amoxicillin;                                                                            as6 

18:51 Vancomycin;                                                                             as6 

18:51 Levofloxacin;                                                                           as6 

18:51 Zofran;                                                                                 as6 

20:01 Bactrim;                                                                                bs2 

20:01 CLAVULANIC ACID;                                                                        bs2 

20:01 Demerol;                                                                                bs2 

20:01 PENICILLINS;                                                                            bs2 

20:01 TRIMETHOPRIM;                                                                           bs2 

- Home Meds:                                                                                      

20:01 Celexa 20 mg Oral tab 1 tab once daily [Active]; fentanyl 25 mcg/hr Topical pt72 1      bs2 

      patch every 72 hours [Active]; Pepcid 20 mg Oral tab 1 tab once daily [Active];             

      Percocet  mg Oral tab 1 tab every 6 hours [Active]; Reglan 10 mg Oral tab 1 tab       

      once daily [Active]; Wellbutrin  mg Oral TbER 1 tab 2 times per day [Active];         

- PMHx:                                                                                           

18:51 Asthma; Cerebral Palsy; cluster headaches; decubitus ulcers on feet; GERD;              as6 

      Hydrocephalus; Hypertension; spina bifida;                                                  

                                                                                                  

- Immunization history:: Adult Immunizations not up to date, Client reports having NOT            

  received the Covid vaccine. Last tetanus immunization: < 10 years ago Flu vaccine is            

  not up to date.                                                                                 

- Social history:: Smoking status: Patient denies any tobacco usage or history of.                

                                                                                                  

                                                                                                  

Screenin:00 Abuse screen: Denies threats or abuse. Denies injuries from another. Nutritional        bs2 

      screening: No deficits noted. Tuberculosis screening: No symptoms or risk factors           

      identified. Fall Risk None identified.                                                      

                                                                                                  

Assessment:                                                                                       

19:30 General: Appears in no apparent distress. comfortable, Behavior is calm, cooperative.   as6 

      Pain: Complains of pain in right flank. Neuro: Level of Consciousness is awake, alert,      

      obeys commands, Oriented to person, place, time, situation. Cardiovascular: Capillary       

      refill < 3 seconds Patient's skin is warm and dry. Respiratory: Airway is patent            

      Respiratory effort is even, unlabored, Respiratory pattern is regular, symmetrical. GI:     

      Reports nausea. : Ortega in place. Derm: Skin Wound noted heel of left foot Decubitus      

      located on sacrum. Musculoskeletal:.                                                        

                                                                                                  

Vital Signs:                                                                                      

18:47  / 101; Pulse 111; Resp 22 S; Temp 98.8; Pulse Ox 94% on R/A; Weight 145.15 kg;   as6 

      Height 4 ft. 11 in. (149.86 cm); Pain 10/10;                                                

21:45  / 91; Pulse 100; Resp 18; Pulse Ox 97% on R/A;                                   df1 

22:30  / 82; Pulse 105; Resp 18; Pulse Ox 98% on R/A;                                   df1 

23:25  / 78; Pulse 108; Resp 20; Pulse Ox 98% on R/A;                                   df1 

18:47 Body Mass Index 64.63 (145.15 kg, 149.86 cm)                                            as6 

                                                                                                  

ED Course:                                                                                        

18:43 Patient arrived in ED.                                                                  jd3 

18:44 Cristopher Bob, RN is Primary Nurse.                                                  jd3 

18:51 Triage completed.                                                                       as6 

18:51 Arm band placed on.                                                                     as6 

19:01 Washington Boyle MD is Attending Physician.                                             mh7 

19:24 Kerri Diaz, RN is Primary Nurse.                                                    bs2 

19:34 Maintain EMS IV. Dressing intact. Good blood return noted. Site clean \T\ dry. Gauge \T\    as
6

      site: 22 g to r forearm .                                                                   

19:59 Blood Culture Adult (2) Sent.                                                           bs2 

20:00 Patient has correct armband on for positive identification. Bed in low position. Call   bs2 

      light in reach. Side rails up X2. Pulse ox on. NIBP on. Door closed. Lights dimmed.         

      Warm blanket given.                                                                         

20:00 Basic Metabolic Panel Sent.                                                             bs2 

20:00 CBC with Diff Sent.                                                                     bs2 

20:00 Hepatic Function Sent.                                                                  bs2 

20:00 Lipase Sent.                                                                            bs2 

20:00 Initial lab(s) drawn, by me, sent to lab. First set of blood cultures drawn by me,      bs2 

      Second set of blood cultures drawn by me.                                                   

20:18 Blood Culture Adult (2) Sent.                                                           bs2 

20:18 Basic Metabolic Panel Sent.                                                             bs2 

20:18 CBC with Diff Sent.                                                                     bs2 

20:18 Hepatic Function Sent.                                                                  bs2 

20:18 Lipase Sent.                                                                            bs2 

20:55 CT Abd/Pelvis - IV Contrast Only In Process Unspecified.                                EDMS

21:30 Domingo Mc MD is Hospitalizing Provider.                                            7 

22:08 COVID-19 SARS RT PCR (Document "Date of Onset" if Symptomatic) Sent.                    df1 

23:19 No provider procedures requiring assistance completed. Patient admitted, IV remains in  df1 

      place.                                                                                      

                                                                                                  

Administered Medications:                                                                         

19:56 Drug: Dilaudid (HYDROmorphone) 1 mg Route: IVP; Site: right forearm;                    bs2 

23:30 Follow up: Response: Pain is decreased                                                  df1 

19:56 Drug: Phenergan (promethazine) 12.5 mg Route: IVP; Site: right forearm;                 bs2 

23:29 Follow up: Response: Nausea is decreased                                                df1 

19:57 Drug: NS 0.9% 1000 ml Route: IV; Rate: 1000 ml; Site: right forearm;                    bs2 

23:30 Follow up: IV Status: Completed infusion; IV Intake: 1000ml                             df1 

21:30 Drug: Dilaudid (HYDROmorphone) 1 mg Route: IVP; Site: right forearm;                    bs2 

23:29 Follow up: Response: Pain is decreased                                                  df1 

21:55 Drug: Rocephin (cefTRIAXone) 1 grams Route: IV; Rate: per protocol; Site: right         df1 

      antecubital;                                                                                

23:29 Follow up: Response: No adverse reaction; IV Status: Completed infusion                 df1 

                                                                                                  

                                                                                                  

Intake:                                                                                           

23:30 IV: 1000ml; Total: 1000ml.                                                              df1 

                                                                                                  

Outcome:                                                                                          

21:31 Decision to Hospitalize by Provider.                                                    7 

                                                                                             

01:14 Admitted to Med/surg accompanied by tech, via stretcher, room 208, with chart, Report   bs2 

      called to  Floor Nurse for 208                                                              

      Condition: stable                                                                           

      Instructed on the need for admit.                                                           

01:40 Patient left the ED.                                                                    bs2 

                                                                                                  

Signatures:                                                                                       

Dispatcher [facility]                           Cristopher Johnson, RN                    RN   jd3                                                  

Washington Boyle MD MD   7                                                  

Kerri Diaz RN                      RN   bs2                                                  

Arpita Garcia                                df1                                                  

Zane West RN                      RN   as6                                                  

                                                                                                  

**************************************************************************************************

## 2021-11-02 NOTE — RAD REPORT
EXAM DESCRIPTION:  CTAbdomen   Pelvis W Contrast - 11/2/2021 8:56 pm

 

CLINICAL HISTORY:  Abdominal pain.

Flank pain;Abd pain

 

COMPARISON:  Abdomen   Pelvis W Contrast dated 8/23/2021; Abdomen   Pelvis W Contrast dated 1/23/2019
; Abdomen   Pelvis W Contrast dated 8/22/2018; Abdomen   Pelvis W Contrast dated 10/5/2016

 

TECHNIQUE:  Biphasic CT imaging of the abdomen and pelvis was performed with 100 ml non-ionic IV cont
rast.

 

All CT scans are performed using dose optimization technique as appropriate and may include automated
 exposure control or mA/KV adjustment according to patient size.

 

FINDINGS:  The lung bases are clear.

 

The liver is prominent in size with diffuse fatty liver infiltration. The spleen, pancreas, adrenal g
lands and left kidney are within normal limits. Mild right hydronephrosis and hydroureter is present.
 The bladder appears decompressed by means of a suprapubic catheter.

 

No bowel obstruction, free air, free fluid or abscess. Shunt tubing is present in the abdomen. The ap
pendix is normal.  Mildly prominent bilateral inguinal lymph nodes are seen, unchanged. Mildly promin
ent retroperitoneal lymph nodes are also stable.

 

No fracture.

 

IMPRESSION:  Mild right hydronephrosis and hydroureter without obstructing stone.

 

Prominent hepatomegaly with fatty liver infiltration.

## 2021-11-02 NOTE — EDPHYS
Physician Documentation                                                                           

 [facility]                                                                 

Name: Redd Dale Jr                                                                            

Age: 35 yrs                                                                                       

Sex: Male                                                                                         

: 1985                                                                                   

MRN: [id number]                                                                                   

Arrival Date: 2021                                                                          

Time: 18:43                                                                                       

Account#: [id number]                                                                            

Bed 7                                                                                             

Private MD:                                                                                       

ED Physician Washington Boyle                                                                      

HPI:                                                                                              

                                                                                             

19:25 This 35 yrs old Black Male presents to ER via EMS with complaints of Right lower back   mh7 

      pain.                                                                                       

19:25 The patient complains of pain in the right flank. The pain radiates to the Right        mh7 

      abdomen. Onset: The symptoms/episode began/occurred last night. Modifying factors: The      

      symptoms are alleviated by nothing. the symptoms are aggravated by movement,                

      palpation/percussion. Associated signs and symptoms: Pertinent positives: diarrhea,         

      nausea, Pertinent negatives: dizziness, dysuria, fever, urinary frequency, headache,        

      hematuria, pain radiating to the lower extremities, vomiting. Severity of pain: At its      

      worst the pain was moderate today, in the emergency department the pain is unchanged.       

      The patient has experienced similar episodes in the past, multiple times.                   

                                                                                                  

Historical:                                                                                       

- Allergies:                                                                                      

18:51 Toradol;                                                                                as6 

18:51 Morphine;                                                                               as6 

18:51 Doxycycline;                                                                            as6 

18:51 Ciprofloxacin;                                                                          as6 

18:51 Amoxicillin;                                                                            as6 

18:51 Vancomycin;                                                                             as6 

18:51 Levofloxacin;                                                                           as6 

18:51 Zofran;                                                                                 as6 

20:01 Bactrim;                                                                                bs2 

20:01 CLAVULANIC ACID;                                                                        bs2 

20:01 Demerol;                                                                                bs2 

20:01 PENICILLINS;                                                                            bs2 

20:01 TRIMETHOPRIM;                                                                           bs2 

- Home Meds:                                                                                      

20:01 Celexa 20 mg Oral tab 1 tab once daily [Active]; fentanyl 25 mcg/hr Topical pt72 1      bs2 

      patch every 72 hours [Active]; Pepcid 20 mg Oral tab 1 tab once daily [Active];             

      Percocet  mg Oral tab 1 tab every 6 hours [Active]; Reglan 10 mg Oral tab 1 tab       

      once daily [Active]; Wellbutrin  mg Oral TbER 1 tab 2 times per day [Active];         

- PMHx:                                                                                           

18:51 Asthma; Cerebral Palsy; cluster headaches; decubitus ulcers on feet; GERD;              as6 

      Hydrocephalus; Hypertension; spina bifida;                                                  

                                                                                                  

- Immunization history:: Adult Immunizations not up to date, Client reports having NOT            

  received the Covid vaccine. Last tetanus immunization: < 10 years ago Flu vaccine is            

  not up to date.                                                                                 

- Social history:: Smoking status: Patient denies any tobacco usage or history of.                

                                                                                                  

                                                                                                  

ROS:                                                                                              

19:25 Constitutional: Negative for fever, chills, and weight loss, Eyes: Negative for injury, mh7 

      pain, redness, and discharge, ENT: Negative for injury, pain, and discharge, Neck:          

      Negative for injury, pain, and swelling, Cardiovascular: Negative for chest pain,           

      palpitations, and edema, Respiratory: Negative for shortness of breath, cough,              

      wheezing, and pleuritic chest pain, : Negative for injury, bleeding, discharge, and       

      swelling, MS/Extremity: Negative for injury and deformity.                                  

19:25 Neuro: Negative for headache, weakness, numbness, tingling, and seizure, Psych:             

      Negative for depression, anxiety, suicide ideation, homicidal ideation, and                 

      hallucinations, Allergy/Immunology: Negative for hives, rash, and allergies, Endocrine:     

      Negative for neck swelling, polydipsia, polyuria, polyphagia, and marked weight             

      changes, Hematologic/Lymphatic: Negative for swollen nodes, abnormal bleeding, and          

      unusual bruising.                                                                           

19:25 Skin: Positive for ulceration, Multiple decubitus, scrotal, chronic.                        

                                                                                                  

Exam:                                                                                             

19:25 Head/Face:  Normocephalic, atraumatic. Eyes:  Pupils equal round and reactive to light, mh7 

      extra-ocular motions intact.  Lids and lashes normal.  Conjunctiva and sclera are           

      non-icteric and not injected.  Cornea within normal limits.  Periorbital areas with no      

      swelling, redness, or edema. Neck:  Trachea midline, no thyromegaly or masses palpated,     

      and no cervical lymphadenopathy.  Supple, full range of motion without nuchal rigidity,     

      or vertebral point tenderness.  No Meningismus. Chest/axilla:  Normal chest wall            

      appearance and motion.  Nontender with no deformity.  No lesions are appreciated.           

19:25 Respiratory:  Lungs have equal breath sounds bilaterally, clear to auscultation and         

      percussion.  No rales, rhonchi or wheezes noted.  No increased work of breathing, no        

      retractions or nasal flaring.                                                               

19:25 MS/ Extremity:  Pulses equal, no cyanosis.  Neurovascular intact.  Full, normal range       

      of motion.                                                                                  

19:25 Psych:  Awake, alert, with orientation to person, place and time.  Behavior, mood, and      

      affect are within normal limits.                                                            

19:25 Constitutional: The patient appears in no acute distress, alert, awake, uncomfortable.      

19:25 Cardiovascular: Rate: tachycardic, Rhythm: regular, Pulses: no pulse deficits are           

      appreciated, Heart sounds: normal, normal S1and S2, Edema: is not appreciated, JVD: is      

      not appreciated.                                                                            

19:25 Neuro: Orientation: is normal, Mentation: is normal, Memory: is normal, Cranial nerves:     

      grossly normal, Cerebellar function: is grossly normal, Motor: is normal, Sensation: is     

      normal, Gait: not tested. seizure activity, is not displayed by the patient, Abnormal       

      movements: there are no abnormal movements.                                                 

19:25 Abdomen/GI: Inspection: obese Bowel sounds: normal, in all quadrants, Palpation: mild   mh7 

      abdominal tenderness, in the right lower quadrant, mass, is not appreciated, rebound        

      tenderness, is not appreciated, voluntary guarding, is not appreciated, involuntary         

      guarding, is not appreciated, no appreciated organomegaly, Rectal exam: the exam is         

      deferred, because of patient request, Indicators: McBurney's point is not tender,           

      Khan's sign is negative, Rovsing's sign is negative, Obturator sign is negative,          

      Psoas sign is negative, Liver: no appreciated palpable abnormalities, Hernia: not           

      appreciated, Suprapubic catheter in place.                                                  

19:25 Back: normal spinal alignment noted, CVA tenderness, that is moderate, is noted on the  mh7 

      right, muscle spasm, is not present.                                                        

19:25 : CVA tenderness, on the right, Male external genitalia: Ulcerations to perennial and     

      scrotum with healing, no erythema, no discharge, no swelling, no tenderness, Bladder:       

      tenderness, that is moderate, Rectal exam: is refused by patient or guardian, Healing       

      decubitus ulcers without erythema, discharge, induration, a suprapubic catheter is          

      noted.                                                                                      

                                                                                                  

Vital Signs:                                                                                      

18:47  / 101; Pulse 111; Resp 22 S; Temp 98.8; Pulse Ox 94% on R/A; Weight 145.15 kg;   as6 

      Height 4 ft. 11 in. (149.86 cm); Pain 10/10;                                                

21:45  / 91; Pulse 100; Resp 18; Pulse Ox 97% on R/A;                                   df1 

22:30  / 82; Pulse 105; Resp 18; Pulse Ox 98% on R/A;                                   df1 

23:25  / 78; Pulse 108; Resp 20; Pulse Ox 98% on R/A;                                   df1 

18:47 Body Mass Index 64.63 (145.15 kg, 149.86 cm)                                            as6 

                                                                                                  

MDM:                                                                                              

21:29 Differential diagnosis: nephrolithiasis, pyelonephritis, UTI, testicular torsion,       mh7 

      diverticulitis. Data reviewed: vital signs, nurses notes, EMS record, old medical           

      records, lab test result(s), CBC, electrolytes, urinalysis, EKG, radiologic studies, CT     

      scan. Data interpreted: Pulse oximetry: on room air is 96 %. Interpretation: normal.        

      Counseling: I had a detailed discussion with the patient and/or guardian regarding: the     

      historical points, exam findings, and any diagnostic results supporting the                 

      discharge/admit diagnosis, the presence of at least one elevated blood pressure reading     

      (>120/80) during this emergency department visit, lab results, radiology results, the       

      need for further work-up and treatment in the hospital. Response to treatment: the          

      patient's symptoms have markedly improved after treatment.                                  

21:31 Patient medically screened.                                                             [facility] 

                                                                                                  

                                                                                             

19:23 Order name: Basic Metabolic Panel; Complete Time: 20:30                                 [facility] 

                                                                                             

19:23 Order name: CBC with Diff; Complete Time: 20:30                                         [facility] 

                                                                                             

19:23 Order name: Hepatic Function; Complete Time: 20:30                                      [facility] 

                                                                                             

19:23 Order name: Lipase; Complete Time: 20:30                                                [facility] 

                                                                                             

19:23 Order name: Urine Culture                                                               [facility] 

                                                                                             

19:23 Order name: Blood Culture Adult (2)                                                     [facility] 

                                                                                             

19:25 Order name: CT Abd/Pelvis - IV Contrast Only; Complete Time: 21:09                      [facility] 

                                                                                             

20:32 Order name: Lactate; Complete Time: 00:45                                               [facility] 

                                                                                             

21:16 Order name: Urine Dipstick-Ancillary; Complete Time: 21:27                              [facility]

                                                                                             

21:38 Order name: Comprehensive Metabolic Panel                                               [facility]

                                                                                             

21:38 Order name: Comprehensive Metabolic Panel                                               [facility]

                                                                                             

21:39 Order name: CBC with Automated Diff                                                     [facility]

                                                                                             

21:39 Order name: CBC with Automated Diff                                                     [facility]

                                                                                             

22:04 Order name: COVID-19 SARS RT PCR (Document "Date of Onset" if Symptomatic); Complete    df1 

      Time: 00:45                                                                                 

                                                                                             

19:23 Order name: IV Saline Lock; Complete Time: 19:57                                        [facility] 

                                                                                             

19:23 Order name: Labs collected and sent; Complete Time: 19:57                               [facility] 

                                                                                             

19:42 Order name: EKG; Complete Time: 19:42                                                   [facility] 

                                                                                             

19:42 Order name: EKG - Nurse/Tech; Complete Time: 20:17                                      [facility] 

                                                                                             

21:39 Order name: 60g Consistent Carbohydrate (ADA )                                 [facility]

                                                                                                  

Administered Medications:                                                                         

19:56 Drug: Dilaudid (HYDROmorphone) 1 mg Route: IVP; Site: right forearm;                    bs2 

23:30 Follow up: Response: Pain is decreased                                                  df1 

19:56 Drug: Phenergan (promethazine) 12.5 mg Route: IVP; Site: right forearm;                 bs2 

23:29 Follow up: Response: Nausea is decreased                                                df1 

19:57 Drug: NS 0.9% 1000 ml Route: IV; Rate: 1000 ml; Site: right forearm;                    bs2 

23:30 Follow up: IV Status: Completed infusion; IV Intake: 1000ml                             df1 

21:30 Drug: Dilaudid (HYDROmorphone) 1 mg Route: IVP; Site: right forearm;                    bs2 

23:29 Follow up: Response: Pain is decreased                                                  df1 

21:55 Drug: Rocephin (cefTRIAXone) 1 grams Route: IV; Rate: per protocol; Site: right         df1 

      antecubital;                                                                                

23:29 Follow up: Response: No adverse reaction; IV Status: Completed infusion                 df1 

                                                                                                  

                                                                                                  

Disposition Summary:                                                                              

21 21:31                                                                                    

Hospitalization Ordered                                                                           

      Hospitalization Status: Inpatient Admission                                             [facility] 

      Provider: Domingo Mc 

      Location: Telemetry/MedSurg (Inpatient)                                                 [facility] 

      Condition: Stable                                                                       [facility] 

      Problem: new                                                                            [facility] 

      Symptoms: have improved                                                                 [facility] 

      Bed/Room Type: Standard                                                                 [facility] 

      Room Assignment: 208(21 00:34)                                                      

      Diagnosis                                                                                   

        - Pyelonephritis                                                                      mh7 

        - Nausea with vomiting, unspecified                                                   [facility] 

        - Dehydration                                                                         [facility] 

      Forms:                                                                                      

        - Medication Reconciliation Form                                                      [facility] 

        - SBAR form                                                                           [facility] 

Signatures:                                                                                       

Dispatcher [employer]                           EDMS                                                 

Vanessa Long RN                       RN   Washington Dorman MD                     MD   mh7                                                  

Kerri Diaz RN                      RN   bs2                                                  

Arpita Garcia                                df1                                                  

Zane West RN                      RN   as6                                                  

                                                                                                  

Corrections: (The following items were deleted from the chart)                                    

 21:31 7                                                                                 

 22:49 [address] [place] [address]  

                                                                                             

00:34  22:49                                                                           cg  

                                                                                                  

**************************************************************************************************

## 2021-11-03 LAB
ALBUMIN SERPL BCP-MCNC: 2.9 G/DL (ref 3.4–5)
ALP SERPL-CCNC: 99 U/L (ref 45–117)
ALT SERPL W P-5'-P-CCNC: 37 U/L (ref 12–78)
AST SERPL W P-5'-P-CCNC: 14 U/L (ref 15–37)
BUN BLD-MCNC: 10 MG/DL (ref 7–18)
GLUCOSE SERPLBLD-MCNC: 127 MG/DL (ref 74–106)
HCT VFR BLD CALC: 48.5 % (ref 39.6–49)
LYMPHOCYTES # SPEC AUTO: 2.2 K/UL (ref 0.7–4.9)
PMV BLD: 8.7 FL (ref 7.6–11.3)
POTASSIUM SERPL-SCNC: 3.9 MMOL/L (ref 3.5–5.1)
RBC # BLD: 5.5 M/UL (ref 4.33–5.43)

## 2021-11-03 RX ADMIN — IPRATROPIUM BROMIDE SCH MG: 0.5 SOLUTION RESPIRATORY (INHALATION) at 13:18

## 2021-11-03 RX ADMIN — HYDROMORPHONE HYDROCHLORIDE PRN MG: 1 INJECTION, SOLUTION INTRAMUSCULAR; INTRAVENOUS; SUBCUTANEOUS at 01:00

## 2021-11-03 RX ADMIN — CEFTRIAXONE SCH MLS: 1 INJECTION, SOLUTION INTRAVENOUS at 21:23

## 2021-11-03 RX ADMIN — SODIUM CHLORIDE SCH MLS: 0.9 INJECTION, SOLUTION INTRAVENOUS at 09:37

## 2021-11-03 RX ADMIN — SODIUM CHLORIDE SCH MLS: 0.9 INJECTION, SOLUTION INTRAVENOUS at 01:00

## 2021-11-03 RX ADMIN — Medication SCH: at 21:00

## 2021-11-03 RX ADMIN — HYDROMORPHONE HYDROCHLORIDE PRN MG: 1 INJECTION, SOLUTION INTRAMUSCULAR; INTRAVENOUS; SUBCUTANEOUS at 19:06

## 2021-11-03 RX ADMIN — HYDROMORPHONE HYDROCHLORIDE PRN MG: 1 INJECTION, SOLUTION INTRAMUSCULAR; INTRAVENOUS; SUBCUTANEOUS at 11:31

## 2021-11-03 RX ADMIN — SODIUM CHLORIDE SCH: 0.9 INJECTION, SOLUTION INTRAVENOUS at 14:00

## 2021-11-03 RX ADMIN — IPRATROPIUM BROMIDE SCH MG: 0.5 SOLUTION RESPIRATORY (INHALATION) at 00:16

## 2021-11-03 RX ADMIN — CEFTRIAXONE SCH MLS: 1 INJECTION, SOLUTION INTRAVENOUS at 09:30

## 2021-11-03 RX ADMIN — Medication SCH ML: at 09:31

## 2021-11-03 RX ADMIN — HUMAN INSULIN SCH: 100 INJECTION, SOLUTION SUBCUTANEOUS at 21:00

## 2021-11-03 RX ADMIN — IPRATROPIUM BROMIDE SCH MG: 0.5 SOLUTION RESPIRATORY (INHALATION) at 07:25

## 2021-11-03 RX ADMIN — PROMETHAZINE HYDROCHLORIDE PRN MG: 25 INJECTION INTRAMUSCULAR; INTRAVENOUS at 19:06

## 2021-11-03 RX ADMIN — PROMETHAZINE HYDROCHLORIDE PRN MG: 25 INJECTION INTRAMUSCULAR; INTRAVENOUS at 11:31

## 2021-11-03 RX ADMIN — PROMETHAZINE HYDROCHLORIDE PRN MG: 25 INJECTION INTRAMUSCULAR; INTRAVENOUS at 01:00

## 2021-11-03 RX ADMIN — BUPROPION HYDROCHLORIDE SCH MG: 150 TABLET, EXTENDED RELEASE ORAL at 21:24

## 2021-11-03 RX ADMIN — IPRATROPIUM BROMIDE SCH MG: 0.5 SOLUTION RESPIRATORY (INHALATION) at 20:00

## 2021-11-03 RX ADMIN — SODIUM CHLORIDE SCH: 0.9 INJECTION, SOLUTION INTRAVENOUS at 22:00

## 2021-11-03 NOTE — P.HP
Certification for Inpatient


Patient admitted to: Inpatient


With expected LOS: >2 Midnights


Patient will require the following post-hospital care: None


Practitioner: I am a practitioner with admitting privileges, knowledge of 

patient current condition, hospital course, and medical plan of care.


Services: Services provided to patient in accordance with Admission requirements

found in Title 42 Section 412.3 of the Code of Federal Regulations





Patient History


Date of Service: 11/03/21


Primary Care Provider: Jesusita


Reason for admission: uti


History of Present Illness: 





Patient comes in with back pain  The patient was found to have a UTI.  Was found

to have mild stranding on CT.  The patient is currently doing well.  


Allergies





levofloxacin [From Levaquin] Allergy (Intermediate, Verified 09/18/18 22:08)


   Hives


morphine Allergy (Intermediate, Verified 09/18/18 22:08)


   Hives


sulfamethoxazole [From Bactrim] Allergy (Intermediate, Verified 09/18/18 22:08)


   Hives


ketorolac tromethamine [From Toradol] Allergy (Verified 09/18/18 22:08)


   Nausea/Vomiting


vancomycin Allergy (Verified 09/18/18 22:08)


   Hives


ondansetron [From Zofran (as hydrochloride)] Adverse Reaction (Mild, Verified 

09/18/18 22:08)


   Nausea/Vomiting


amoxicillin [From Augmentin] Adverse Reaction (Verified 09/18/18 22:08)


   Hives/Rash


ciprofloxacin Adverse Reaction (Verified 09/18/18 22:08)


   Nausea/Vomiting


doxycycline Adverse Reaction (Verified 09/18/18 22:08)


   Nausea/Vomiting


sesame seed Adverse Reaction (Verified 09/18/18 22:08)


   diarrhea


pop corn Adverse Reaction (Severe, Uncoded 09/18/18 22:08)


   diarrhea





Home Medications: 








Oxycodone HCl [Roxicodone] 5 mg PO QID PRN 08/23/18 


Acetaminophen [Tylenol] 325 mg PO Q6HR PRN 11/25/19 


Citalopram Hydrobromide [Citalopram HBr] 40 mg PO DAILY 11/25/19 


Famotidine [Pepcid] 20 mg PO BID 11/25/19 


Ibuprofen 800 mg PO TID 11/25/19 


Lidocaine [Aspercreme] 1 each TP Q12HR 11/25/19 


Melatonin/Pyridoxine [Melatonin 5 mg Tablet] 2 each PO BEDTIME 11/25/19 


Metoclopramide [Reglan*] 10 mg PO BEDTIME 11/25/19 


Trazodone [Desyrel*] 50 mg PO BEDTIME 11/25/19 


buPROPion HCl [Wellbutrin Sr] 150 mg PO BID 11/25/19 








- Past Medical/Surgical History


Diabetic: No


-: Spina Bifida with hydrocephalus


-: Depression


-: Insomnia


-: Incontinence


-: GERD


-: Chronic pain syndrome


-: Muscle wasting


-: Paraplegia


-: Shunt revision


-: Cholecystectomy


-: Foot surgery


-: Hip sx BL


-: Bilateral hip surgery


-: Appendectomy


Psychosocial/ Personal History: Patient currently single.  He has no children.  

He is disabled.





- Family History


  ** Father


-: Hypertension





  ** Mother


-: Hypertension





- Social History


Smoking Status: Unknown if ever smoked


Alcohol use: Yes


CD- Drugs: No


Caffeine use: Yes


Place of Residence: Home





Review of Systems


10-point ROS is otherwise unremarkable


Musculoskeletal: Back Pain





Physical Examination





- Vital Signs


Temperature: 98.4 F


Blood Pressure: 140/79


Pulse: 97


Respirations: 16


Pulse Ox (%): 95





- Physical Exam


General: Alert, In no apparent distress


HEENT: Atraumatic, PERRLA, Mucous membr. moist/pink, EOMI, Sclerae nonicteric


Neck: Supple, 2+ carotid pulse no bruit, No LAD, Without JVD or thyroid 

abnormality


Respiratory: Clear to auscultation bilaterally, Normal air movement


Cardiovascular: Regular rate/rhythm, Normal S1 S2


Gastrointestinal: Normal bowel sounds, No tenderness


Musculoskeletal: No tenderness


Integumentary: No rashes


Neurological: Normal gait, Normal speech, Normal strength at 5/5 x4 extr, Normal

tone, Normal affect


Lymphatics: No axilla or inguinal lymphadenopathy





- Studies


Laboratory Data (last 24 hrs)





11/02/21 19:45: WBC 11.20 H, Hgb 16.5, Hct 50.1 H, Plt Count 475 H


11/02/21 19:45: Sodium 141, Potassium 3.7, BUN 11, Creatinine 0.72, Glucose 82, 

Total Bilirubin 0.5, AST 13 L, ALT 41, Alkaline Phosphatase 108, Lipase 53 L








Assessment and Plan





- Problems (Diagnosis)


(1) UTI (urinary tract infection)


Current Visit: Yes   Status: Acute   


Plan: 


Will continue the patient on rocephin  Cultures are negative at this point. 


Qualifiers: 


   Urinary tract infection type: acute cystitis 





(2) DM2 (diabetes mellitus, type 2)


Current Visit: Yes   Status: Chronic   


Plan: 


restart lantus.   Will check his a1c 


Qualifiers: 


   Diabetes mellitus long term insulin use: with long term use 





(3) Hypertension


Onset Date: 11/03/17   Current Visit: No   Status: Chronic   


Plan: 


restart his home medications.  Will adjust as needed 





Discharge Plan: Home





- Advance Directives


Does patient have a Living Will: No


Does patient have a Durable POA for Healthcare: Yes





- Code Status/Comfort Care


Code Status Assessed: No


Code Status: Full Code


Physician Review: Patient Assessed, Agree with Above Assessment and Plan


Critical Care: No


Time Spent Managing Pts Care (In Minutes): 50

## 2021-11-03 NOTE — EKG
Test Date:    2021-11-02               Test Time:    20:11:15

Technician:   ALEXSANDER                                    

                                                     

MEASUREMENT RESULTS:                                       

Intervals:                                           

Rate:         91                                     

ID:           [id number]                                    

QRSD:         92                                     

QT:           354                                    

QTc:          435                                    

Axis:                                                

P:            45                                     

ID:           [id number]                                    

QRS:          63                                     

T:            46                                     

                                                     

INTERPRETIVE STATEMENTS:                                       

                                                     

Normal sinus rhythm

Normal ECG

Compared to ECG 06/25/2020 12:42:39

No significant changes



Electronically Signed On 11-03-21 13:10:26 CDT by Cheng Anglin

## 2021-11-04 VITALS — OXYGEN SATURATION: 92 %

## 2021-11-04 VITALS — DIASTOLIC BLOOD PRESSURE: 82 MMHG | SYSTOLIC BLOOD PRESSURE: 123 MMHG | TEMPERATURE: 97.9 F

## 2021-11-04 LAB
ALBUMIN SERPL BCP-MCNC: 2.8 G/DL (ref 3.4–5)
ALP SERPL-CCNC: 98 U/L (ref 45–117)
ALT SERPL W P-5'-P-CCNC: 33 U/L (ref 12–78)
AST SERPL W P-5'-P-CCNC: 12 U/L (ref 15–37)
BUN BLD-MCNC: 10 MG/DL (ref 7–18)
GLUCOSE SERPLBLD-MCNC: 116 MG/DL (ref 74–106)
HCT VFR BLD CALC: 45.5 % (ref 39.6–49)
LYMPHOCYTES # SPEC AUTO: 2.1 K/UL (ref 0.7–4.9)
PMV BLD: 7.9 FL (ref 7.6–11.3)
POTASSIUM SERPL-SCNC: 4.1 MMOL/L (ref 3.5–5.1)
RBC # BLD: 5.07 M/UL (ref 4.33–5.43)

## 2021-11-04 RX ADMIN — BUPROPION HYDROCHLORIDE SCH: 150 TABLET, EXTENDED RELEASE ORAL at 09:00

## 2021-11-04 RX ADMIN — HYDROMORPHONE HYDROCHLORIDE PRN MG: 1 INJECTION, SOLUTION INTRAMUSCULAR; INTRAVENOUS; SUBCUTANEOUS at 01:09

## 2021-11-04 RX ADMIN — HUMAN INSULIN SCH: 100 INJECTION, SOLUTION SUBCUTANEOUS at 07:30

## 2021-11-04 RX ADMIN — IPRATROPIUM BROMIDE SCH MG: 0.5 SOLUTION RESPIRATORY (INHALATION) at 08:00

## 2021-11-04 RX ADMIN — HYDROMORPHONE HYDROCHLORIDE PRN MG: 1 INJECTION, SOLUTION INTRAMUSCULAR; INTRAVENOUS; SUBCUTANEOUS at 07:14

## 2021-11-04 RX ADMIN — IPRATROPIUM BROMIDE SCH: 0.5 SOLUTION RESPIRATORY (INHALATION) at 02:00

## 2021-11-04 RX ADMIN — SODIUM CHLORIDE SCH MLS: 0.9 INJECTION, SOLUTION INTRAVENOUS at 07:51

## 2021-11-04 RX ADMIN — PROMETHAZINE HYDROCHLORIDE PRN MG: 25 INJECTION INTRAMUSCULAR; INTRAVENOUS at 07:13

## 2021-11-04 RX ADMIN — Medication SCH: at 09:00

## 2021-11-04 RX ADMIN — PROMETHAZINE HYDROCHLORIDE PRN MG: 25 INJECTION INTRAMUSCULAR; INTRAVENOUS at 01:10

## 2021-11-04 RX ADMIN — CEFTRIAXONE SCH: 1 INJECTION, SOLUTION INTRAVENOUS at 09:00

## 2021-11-04 RX ADMIN — SODIUM CHLORIDE SCH MLS: 0.9 INJECTION, SOLUTION INTRAVENOUS at 00:06

## 2021-11-04 NOTE — P.DS
Admission Date: 11/02/21


Discharge Date: 11/04/21


Primary Care Provider: Jesusita


Disposition: ROUTINE DISCHARGE


Discharge Condition: GOOD


Reason for Admission: uti





- Problems


(1) UTI (urinary tract infection)


Current Visit: Yes   Status: Acute   


Qualifiers: 


   Urinary tract infection type: acute cystitis 





(2) DM2 (diabetes mellitus, type 2)


Current Visit: Yes   Status: Chronic   


Qualifiers: 


   Diabetes mellitus long term insulin use: with long term use 





(3) Hypertension


Onset Date: 11/03/17   Current Visit: No   Status: Chronic   


Brief History of Present Illness: 





Patient comes in with back pain  The patient was found to have a UTI.  Was found

to have mild stranding on CT.  The patient is currently doing well.  


Hospital Course: 


Patient is doing well. No growth on the cultures.  Will discharge him home today

on keflex.  He can get his last dose of rocephin before he leaves


Vital Signs/Physical Exam: 














Temp Pulse Resp BP Pulse Ox


 


 97.6 F   72   16   122/64   94 


 


 11/04/21 04:00  11/04/21 04:00  11/04/21 07:44  11/04/21 04:00  11/04/21 07:44








General: Alert, In no apparent distress


HEENT: Atraumatic, PERRLA, EOMI


Neck: Supple, JVD not distended


Respiratory: Clear to auscultation bilaterally, Normal air movement


Cardiovascular: Regular rate/rhythm, Normal S1 S2


Gastrointestinal: Normal bowel sounds, No tenderness


Musculoskeletal: No tenderness


Integumentary: No rashes


Neurological: Normal speech, Normal tone, Normal affect


Lymphatics: No axilla or inguinal lymphadenopathy


Laboratory Data at Discharge: 














WBC  9.00 K/uL (4.3-10.9)  D 11/04/21  05:53    


 


Hgb  15.0 g/dL (13.6-17.9)   11/04/21  05:53    


 


Hct  45.5 % (39.6-49.0)   11/04/21  05:53    


 


Plt Count  442 K/uL (152-406)  H D 11/04/21  05:53    


 


Sodium  142 mmol/L (136-145)   11/04/21  05:53    


 


Potassium  4.1 mmol/L (3.5-5.1)   11/04/21  05:53    


 


BUN  10 mg/dL (7-18)   11/04/21  05:53    


 


Creatinine  0.57 mg/dL (0.55-1.3)   11/04/21  05:53    


 


Glucose  116 mg/dL ()  H  11/04/21  05:53    


 


Total Bilirubin  0.3 mg/dL (0.2-1.0)   11/04/21  05:53    


 


AST  12 U/L (15-37)  L  11/04/21  05:53    


 


ALT  33 U/L (12-78)   11/04/21  05:53    


 


Alkaline Phosphatase  98 U/L ()   11/04/21  05:53    


 


Lipase  53 U/L ()  L  11/02/21  19:45    








Home Medications: 








Oxycodone HCl [Roxicodone] 5 mg PO QID PRN 08/23/18 


Acetaminophen [Tylenol] 325 mg PO Q6HR PRN 11/25/19 


Citalopram Hydrobromide [Citalopram HBr] 40 mg PO DAILY 11/25/19 


Famotidine [Pepcid] 20 mg PO BID 11/25/19 


Ibuprofen 800 mg PO TID 11/25/19 


Lidocaine [Aspercreme] 1 each TP Q12HR 11/25/19 


Melatonin/Pyridoxine [Melatonin 5 mg Tablet] 2 each PO BEDTIME 11/25/19 


Metoclopramide [Reglan*] 10 mg PO BEDTIME 11/25/19 


Trazodone [Desyrel*] 50 mg PO BEDTIME 11/25/19 


buPROPion HCl [Wellbutrin Sr] 150 mg PO BID 11/25/19 


Cephalexin [Keflex] 500 mg PO Q12HR 6AM AND 6PM 5 Days #10 capsule 11/04/21 





New Medications: 


Cephalexin [Keflex] 500 mg PO Q12HR 6AM AND 6PM 5 Days #10 capsule


Diet: ADA


Activity: Ad rickie


Followup: 


Domingo Mc MD [ACTIVE - CAN ADMIT] - 1 Week (call the office for a telehealth

appointment )


Physician Review: Patient Assessed, Agree with Above Assessment and Plan


Time spent managing pt's care (in minutes): 30

## 2021-11-22 ENCOUNTER — HOSPITAL ENCOUNTER (OUTPATIENT)
Dept: HOSPITAL 97 - ER | Age: 36
Setting detail: OBSERVATION
LOS: 2 days | Discharge: HOME | End: 2021-11-24
Attending: INTERNAL MEDICINE | Admitting: INTERNAL MEDICINE
Payer: COMMERCIAL

## 2021-11-22 VITALS — BODY MASS INDEX: 60.5 KG/M2

## 2021-11-22 DIAGNOSIS — R19.7: Primary | ICD-10-CM

## 2021-11-22 DIAGNOSIS — Z20.822: ICD-10-CM

## 2021-11-22 DIAGNOSIS — E11.9: ICD-10-CM

## 2021-11-22 DIAGNOSIS — L89.322: ICD-10-CM

## 2021-11-22 DIAGNOSIS — Z79.4: ICD-10-CM

## 2021-11-22 DIAGNOSIS — Q05.9: ICD-10-CM

## 2021-11-22 DIAGNOSIS — G82.20: ICD-10-CM

## 2021-11-22 LAB
ALBUMIN SERPL BCP-MCNC: 2.7 G/DL (ref 3.4–5)
ALP SERPL-CCNC: 111 U/L (ref 45–117)
ALT SERPL W P-5'-P-CCNC: 49 U/L (ref 12–78)
AST SERPL W P-5'-P-CCNC: 27 U/L (ref 15–37)
BUN BLD-MCNC: 10 MG/DL (ref 7–18)
GLUCOSE SERPLBLD-MCNC: 87 MG/DL (ref 74–106)
HCT VFR BLD CALC: 20.1 % (ref 39.6–49)
HCT VFR BLD CALC: 47.1 % (ref 39.6–49)
LIPASE SERPL-CCNC: 74 U/L (ref 73–393)
LYMPHOCYTES # SPEC AUTO: 0.9 K/UL (ref 0.7–4.9)
LYMPHOCYTES # SPEC AUTO: 2.1 K/UL (ref 0.7–4.9)
PMV BLD: 7.4 FL (ref 7.6–11.3)
PMV BLD: 7.8 FL (ref 7.6–11.3)
POTASSIUM SERPL-SCNC: 4 MMOL/L (ref 3.5–5.1)
RBC # BLD: 2.25 M/UL (ref 4.33–5.43)
RBC # BLD: 5.39 M/UL (ref 4.33–5.43)

## 2021-11-22 PROCEDURE — 87324 CLOSTRIDIUM AG IA: CPT

## 2021-11-22 PROCEDURE — 87046 STOOL CULTR AEROBIC BACT EA: CPT

## 2021-11-22 PROCEDURE — 99285 EMERGENCY DEPT VISIT HI MDM: CPT

## 2021-11-22 PROCEDURE — 96375 TX/PRO/DX INJ NEW DRUG ADDON: CPT

## 2021-11-22 PROCEDURE — 83690 ASSAY OF LIPASE: CPT

## 2021-11-22 PROCEDURE — 87449 NOS EACH ORGANISM AG IA: CPT

## 2021-11-22 PROCEDURE — 82274 ASSAY TEST FOR BLOOD FECAL: CPT

## 2021-11-22 PROCEDURE — 74176 CT ABD & PELVIS W/O CONTRAST: CPT

## 2021-11-22 PROCEDURE — 36415 COLL VENOUS BLD VENIPUNCTURE: CPT

## 2021-11-22 PROCEDURE — 85025 COMPLETE CBC W/AUTO DIFF WBC: CPT

## 2021-11-22 PROCEDURE — 80048 BASIC METABOLIC PNL TOTAL CA: CPT

## 2021-11-22 PROCEDURE — 96361 HYDRATE IV INFUSION ADD-ON: CPT

## 2021-11-22 PROCEDURE — 80076 HEPATIC FUNCTION PANEL: CPT

## 2021-11-22 PROCEDURE — 96374 THER/PROPH/DIAG INJ IV PUSH: CPT

## 2021-11-22 PROCEDURE — 87045 FECES CULTURE AEROBIC BACT: CPT

## 2021-11-22 NOTE — RAD REPORT
EXAM DESCRIPTION:  CT - Abdomen   Pelvis Wo Contrast - 11/22/2021 6:40 pm

 

CLINICAL HISTORY:  Abdominal  pain

 

COMPARISON:  November 2, 2021

 

TECHNIQUE:  Computed axial tomography of the abdomen and pelvis was obtained. IV and oral contrast we
re not requested.

 

All CT scans are performed using dose optimization technique as appropriate and may include automated
 exposure control or mA/KV adjustment according to patient size.

 

FINDINGS:   The evaluation of solid organs, vessels, appendix and bowel is limited secondary to the l
ack of contrast administration.

 

The liver, spleen, pancreas, adrenals and kidneys appear grossly normal.

 

There is no evidence of diverticulitis.

 

Suprapubic catheter within the bladder. Thickened bladder wall likely related to chronic bladder outl
et obstruction. Tiny umbilical hernia.

 

 shunt tubing in place.

 

Spina bifida. Stranding within the subcutaneous fat left buttocks related to inflammation. No abscess
 seen.

 

IMPRESSION:   No significant acute abnormality is displayed.

## 2021-11-22 NOTE — ER
Nurse's Notes                                                                                     

 [facility]                                                                 

Name: Redd Dale Jr                                                                            

Age: 36 yrs                                                                                       

Sex: Male                                                                                         

: 1985                                                                                   

MRN: [id number]                                                                                   

Arrival Date: 2021                                                                          

Time: 15:48                                                                                       

Account#: [id number]                                                                            

Bed 24                                                                                            

Private MD:                                                                                       

Diagnosis: Diarrhea, unspecified;Nausea with vomiting, unspecified                                

                                                                                                  

Presentation:                                                                                     

                                                                                             

16:18 Chief complaint: Patient states: Nausea/vomiting/diarrhea that began on Friday. Reports aa5 

      being sent here by Dr. Mc. Coronavirus screen: diarrhea, nausea, vomiting. Ebola         

      Screen: No symptoms or risks identified at this time. Initial Sepsis Screen: Does the       

      patient meet any 2 criteria? HR > 90 bpm. Does the patient have a suspected source of       

      infection? Yes:. Risk Assessment: Do you want to hurt yourself or someone else? Patient     

      reports no desire to harm self or others. Onset of symptoms was 2021.              

16:18 Method Of Arrival: Ambulatory                                                           aa5 

16:18 Acuity: AYESHA 3                                                                           aa5 

                                                                                                  

Historical:                                                                                       

- Allergies:                                                                                      

16:20 Amoxicillin;                                                                            aa5 

16:20 Bactrim;                                                                                aa5 

16:20 Ciprofloxacin;                                                                          aa5 

16:20 CLAVULANIC ACID;                                                                        aa5 

16:20 Demerol;                                                                                aa5 

16:20 Doxycycline;                                                                            aa5 

16:20 Levofloxacin;                                                                           aa5 

16:20 Morphine;                                                                               aa5 

16:20 PENICILLINS;                                                                            aa5 

16:20 Toradol;                                                                                aa5 

16:20 TRIMETHOPRIM;                                                                           aa5 

16:20 Vancomycin;                                                                             aa5 

16:20 Zofran;                                                                                 aa5 

- PMHx:                                                                                           

16:20 Asthma; Cerebral Palsy; cluster headaches; decubitus ulcers on feet; GERD;              aa5 

      Hydrocephalus; Hypertension; spina bifida;                                                  

                                                                                                  

- Immunization history:: Client reports having NOT received the Covid vaccine.                    

- Social history:: Smoking status: Patient reports the use of cigarette tobacco                   

  products, 4-5 cigarettes a day .                                                                

                                                                                                  

                                                                                                  

Screenin:58 Abuse screen: Denies threats or abuse. Nutritional screening: No deficits noted.        tw2 

      Tuberculosis screening: No symptoms or risk factors identified. Fall Risk None              

      identified.                                                                                 

                                                                                                  

Assessment:                                                                                       

17:34 General: Appears in no apparent distress. uncomfortable, Behavior is calm, cooperative. aa5 

      Pain: Complains of pain in right lower quadrant. Neuro: Level of Consciousness is           

      awake, alert, obeys commands, Oriented to person, place, time, situation.                   

      Cardiovascular: Capillary refill < 3 seconds Patient's skin is warm and dry.                

      Respiratory: Airway is patent Trachea midline Respiratory effort is even, unlabored,        

      Respiratory pattern is regular, symmetrical. GI: Abdomen is tender to palpation in          

      right lower quadrant Reports diarrhea, nausea, vomiting. : Parent/caregiver report        

      the patient having indwelling catheter in place. Derm: Wound noted Other: annabella wounds to     

      feet, drsg dry and intact, wound to sacrum, drsg dry and intact. Musculoskeletal:           

      Reports uses wheelchair.                                                                    

18:30 Reassessment: Patient and/or family updated on plan of care and expected duration. Pain as6 

      level reassessed. Patient is alert, oriented x 3, equal unlabored respirations, skin        

      warm/dry/pink. pt still c/o pain, provided notified.                                        

                                                                                                  

Vital Signs:                                                                                      

16:18  / 103; Pulse 96; Resp 18 S; Temp 98.6(TE); Pulse Ox 98% on R/A; Weight 145.15 kg aa5 

      (R); Height 4 ft. 11 in. (149.86 cm) (R);                                                   

18:37  / 93; Pulse 84; Resp 20 S; Pulse Ox 98% on R/A;                                  as6 

21:00  / 86; Pulse 78; Resp 18; Pulse Ox 99% on R/A;                                    df1 

22:55  / 88; Pulse 85; Resp 18; Pulse Ox 98% on R/A;                                    df1 

16:18 Body Mass Index 64.63 (145.15 kg, 149.86 cm)                                            aa5 

                                                                                                  

ED Course:                                                                                        

15:48 Patient arrived in ED.                                                                  ds1 

16:18 Arm band placed on.                                                                     aa5 

16:20 Triage completed.                                                                       aa5 

16:53 [facility]                                                        kb  

16:53 Juan Luis Lerner MD is Attending Physician.                                              kb  

16:57 Zane West RN is Primary Nurse.                                                    as6 

17:17 Inserted saline lock: 22 gauge in left hand, using aseptic technique.                   aa5 

17:40 Bed in low position. Call light in reach. Side rails up X2. Pulse ox on. NIBP on.       aa5 

18:25 Initial lab(s) drawn, by me, sent to lab.                                               jd3 

18:39 Abdomen In Process Unspecified.                                                         EDMS

19:07 Lab(s) recollected, by me, sent to lab.                                                 jd3 

21:14 Domingo Mc MD is Hospitalizing Provider.                                            kb  

21:20 CBC with Diff Sent.                                                                     tw5 

22:58 No provider procedures requiring assistance completed.                                  df1 

23:15 Patient admitted, IV remains in place.                                                  df1 

                                                                                                  

Administered Medications:                                                                         

17:16 Drug: NS 0.9% 1000 ml Route: IV; Rate: 1000 ml; Site: left hand;                        aa5 

18:15 Follow up: Response: No adverse reaction; IV Status: Completed infusion; IV Intake:     jd3 

      1000ml                                                                                      

17:27 Drug: Phenergan (promethazine) 12.5 mg Route: IVP; Site: left hand;                     aa5 

18:20 Follow up: Response: No adverse reaction                                                jd3 

17:27 Drug: Dilaudid (HYDROmorphone) 1 mg Route: IVP; Infused Over: 30 mins; Site: left hand; aa5 

18:20 Follow up: Response: No adverse reaction; RASS: Alert and Calm (0)                      jd3 

19:21 Drug: Phenergan (promethazine) 12.5 mg Route: IVP; Site: left hand;                     jd3 

22:55 Follow up: Response: Nausea is decreased                                                df1 

19:21 Drug: Dilaudid (HYDROmorphone) 1 mg Route: IVP; Site: left hand;                        jd3 

22:55 Follow up: Response: Pain is decreased                                                  df1 

22:24 Drug: Dilaudid (HYDROmorphone) 1 mg Route: IVP; Site: left hand;                        df1 

22:54 Follow up: Response: Pain is decreased                                                  df1 

22:24 Drug: Promethazine 12.5 mg Route: IVP; Site: left hand;                                 df1 

22:54 Follow up: Response: Nausea is decreased                                                df1 

                                                                                                  

                                                                                                  

Intake:                                                                                           

18:15 IV: 1000ml; Total: 1000ml.                                                              jd3 

                                                                                                  

Outcome:                                                                                          

21:15 Decision to Hospitalize by Provider.                                                    kb  

23:14 Admitted to ER Hold.  Please see [facility] for further documentation.                    df1 

23:14 Condition: stable                                                                           

23:14 Instructed on the need for admit.                                                           

                                                                                             

09:25 Patient left the ED.                                                                    iw  

                                                                                                  

Signatures:                                                                                       

Dispatcher [employer]                           EDMS                                                 

Lenka Amaro, KAVON-C                 FNP-Lilly Casper                                ds1                                                  

Renetta Terry, RN                     RN   mica Kincaid Vilma, RN                     RN   aa5                                                  

Kay Louei RN                          RN   tw2                                                  

Cristopher Bob RN                    RN   Arpita Dick                                df1                                                  

Graciela Winter                                tw5                                                  

Zane West RN                      RN   as6                                                  

                                                                                                  

Corrections: (The following items were deleted from the chart)                                    

                                                                                             

16:21 16:18  / 103; Pulse 96bpm; Resp 18bpm; Spontaneous; Pulse Ox 98% RA; Temp 98.6F   aa5 

      Temporal; aa5                                                                               

22:58 21:00  / ???; Pulse 78bpm; Resp 18bpm; Pulse Ox 99% RA; df1                       df1 

                                                                                                  

**************************************************************************************************

## 2021-11-22 NOTE — EDPHYS
Physician Documentation                                                                           

 [facility]                                                                 

Name: Redd Dale Jr                                                                            

Age: 36 yrs                                                                                       

Sex: Male                                                                                         

: 1985                                                                                   

MRN: [id number]                                                                                   

Arrival Date: 2021                                                                          

Time: 15:48                                                                                       

Account#: [id number]                                                                            

Bed 24                                                                                            

Private MD:                                                                                       

ED Physician Juan Luis Lerner                                                                       

HPI:                                                                                              

                                                                                             

17:22 This 36 yrs old Black Male presents to ER via Ambulatory with complaints of Diarrhea,   kb  

      Nausea/Vomiting.                                                                            

17:22 The patient presents to the emergency department with nausea, vomiting, diarrhea,       kb  

      abdominal pain. Onset: The symptoms/episode began/occurred last week. Possible causes:      

      unknown. The symptoms are aggravated by nothing. The symptoms are alleviated by             

      nothing. Associated signs and symptoms: Pertinent positives: abdominal pain, diarrhea,      

      nausea, vomiting, Pertinent negatives: fever. Severity of symptoms: At their worst the      

      symptoms were moderate in the emergency department the symptoms are unchanged. The          

      patient has not experienced similar symptoms in the past. The patient has been recently     

      seen by a physician:. Pt reports n/v/d and abd pain that started last week. States he       

      saw Dr Mc today and was sent here for admission.                                         

                                                                                                  

Historical:                                                                                       

- Allergies:                                                                                      

16:20 Amoxicillin;                                                                            aa5 

16:20 Bactrim;                                                                                aa5 

16:20 Ciprofloxacin;                                                                          aa5 

16:20 CLAVULANIC ACID;                                                                        aa5 

16:20 Demerol;                                                                                aa5 

16:20 Doxycycline;                                                                            aa5 

16:20 Levofloxacin;                                                                           aa5 

16:20 Morphine;                                                                               aa5 

16:20 PENICILLINS;                                                                            aa5 

16:20 Toradol;                                                                                aa5 

16:20 TRIMETHOPRIM;                                                                           aa5 

16:20 Vancomycin;                                                                             aa5 

16:20 Zofran;                                                                                 aa5 

- PMHx:                                                                                           

16:20 Asthma; Cerebral Palsy; cluster headaches; decubitus ulcers on feet; GERD;              aa5 

      Hydrocephalus; Hypertension; spina bifida;                                                  

                                                                                                  

- Immunization history:: Client reports having NOT received the Covid vaccine.                    

- Social history:: Smoking status: Patient reports the use of cigarette tobacco                   

  products, 4-5 cigarettes a day .                                                                

                                                                                                  

                                                                                                  

ROS:                                                                                              

17:21 Constitutional: Negative for fever, chills, and weight loss.                            kb  

17:21 Abdomen/GI: Positive for abdominal pain, nausea, vomiting, and diarrhea.                    

17:21 All other systems are negative.                                                             

                                                                                                  

Exam:                                                                                             

17:22 Constitutional:  This is a well developed, well nourished patient who is awake, alert,  kb  

      and in no acute distress. Head/Face:  Normocephalic, atraumatic. ENT:  Moist Mucous         

      membranes Cardiovascular:  Regular rate and rhythm with a normal S1 and S2.  No             

      gallops, murmurs, or rubs.  No pulse deficits. Respiratory:  Respirations even and          

      unlabored. No increased work of breathing, no retractions or nasal flaring. Skin:           

      Warm, dry with normal turgor.  Normal color. MS/ Extremity:  Pulses equal, no cyanosis.     

       Neurovascular intact.  Full, normal range of motion. Neuro:  Awake and alert, GCS 15,      

      oriented to person, place, time, and situation. Moves all extremities. Normal gait.         

      Psych:  Awake, alert, with orientation to person, place and time.  Behavior, mood, and      

      affect are within normal limits.                                                            

17:22 Abdomen/GI: Inspection: abdomen appears normal, Bowel sounds: normal, in all quadrants,     

      Palpation: soft, in all quadrants, moderate abdominal tenderness, in all quadrants.         

                                                                                                  

Vital Signs:                                                                                      

16:18  / 103; Pulse 96; Resp 18 S; Temp 98.6(TE); Pulse Ox 98% on R/A; Weight 145.15 kg aa5 

      (R); Height 4 ft. 11 in. (149.86 cm) (R);                                                   

18:37  / 93; Pulse 84; Resp 20 S; Pulse Ox 98% on R/A;                                  as6 

21:00  / 86; Pulse 78; Resp 18; Pulse Ox 99% on R/A;                                    df1 

22:55  / 88; Pulse 85; Resp 18; Pulse Ox 98% on R/A;                                    df1 

16:18 Body Mass Index 64.63 (145.15 kg, 149.86 cm)                                            aa5 

                                                                                                  

MDM:                                                                                              

16:53 Patient medically screened.                                                             kb  

17:21 Data reviewed: vital signs, nurses notes. Data interpreted: Pulse oximetry: on room air kb  

      is 98 %. Interpretation: normal. Counseling: I had a detailed discussion with the           

      patient and/or guardian regarding: the historical points, exam findings, and any            

      diagnostic results supporting the discharge/admit diagnosis, lab results, radiology         

      results, the need for further work-up and treatment in the hospital.                        

20:31 ED course: Initial CBC was done with blood that was drawn proximal to an IV with NS     kb  

      infusing. Redraw ordered.                                                                   

21:19 ED course: Pt unable to tolerate PO intake.                                             kb  

                                                                                                  

                                                                                             

16:54 Order name: Basic Metabolic Panel; Complete Time: 20:41                                 kb  

                                                                                             

16:54 Order name: CBC with Diff; Complete Time: 18:43                                         kb  

                                                                                             

16:54 Order name: Hepatic Function; Complete Time: 20:41                                      kb  

                                                                                             

16:54 Order name: Lipase; Complete Time: 20:41                                                kb  

                                                                                             

16:54 Order name: COVID-19 SARS RT PCR (Document "Date of Onset" if Symptomatic); Complete    kb  

      Time: 18:02                                                                                 

                                                                                             

20:42 Order name: CBC with Diff; Complete Time: 21:28                                         kb  

                                                                                             

18:38 Order name: Abdomen ; Complete Time: 19:11                                              EDMS

                                                                                             

07:25 Order name: CBC with Automated Diff                                                     EDMS

                                                                                             

07:25 Order name: Basic Metabolic Panel                                                       EDMS

                                                                                             

07:25 Order name: Liver (Hepatic) Function                                                    EDMS

                                                                                             

07:25 Order name: Lipase                                                                      EDMS

                                                                                             

16:54 Order name: IV Saline Lock; Complete Time: 17:16                                        kb  

                                                                                             

16:54 Order name: Labs collected and sent; Complete Time: 18:30                               kb  

                                                                                             

19:01 Order name: Labs - recollect needed: recollect lavender and green top; Complete Time:   bd  

      19:07                                                                                       

                                                                                                  

Administered Medications:                                                                         

17:16 Drug: NS 0.9% 1000 ml Route: IV; Rate: 1000 ml; Site: left hand;                        aa5 

18:15 Follow up: Response: No adverse reaction; IV Status: Completed infusion; IV Intake:     jd3 

      1000ml                                                                                      

17:27 Drug: Phenergan (promethazine) 12.5 mg Route: IVP; Site: left hand;                     aa5 

18:20 Follow up: Response: No adverse reaction                                                jd3 

17:27 Drug: Dilaudid (HYDROmorphone) 1 mg Route: IVP; Infused Over: 30 mins; Site: left hand; aa5 

18:20 Follow up: Response: No adverse reaction; RASS: Alert and Calm (0)                      jd3 

19:21 Drug: Phenergan (promethazine) 12.5 mg Route: IVP; Site: left hand;                     jd3 

22:55 Follow up: Response: Nausea is decreased                                                df1 

19:21 Drug: Dilaudid (HYDROmorphone) 1 mg Route: IVP; Site: left hand;                        jd3 

22:55 Follow up: Response: Pain is decreased                                                  df1 

22:24 Drug: Dilaudid (HYDROmorphone) 1 mg Route: IVP; Site: left hand;                        df1 

22:54 Follow up: Response: Pain is decreased                                                  df1 

22:24 Drug: Promethazine 12.5 mg Route: IVP; Site: left hand;                                 df1 

22:54 Follow up: Response: Nausea is decreased                                                df1 

                                                                                                  

                                                                                                  

Disposition:                                                                                      

                                                                                             

12:41 Co-signature as Attending Physician, Juan Luis Lerner MD I agree with the assessment and   kdr 

      plan of care.                                                                               

                                                                                                  

Disposition Summary:                                                                              

21 21:15                                                                                    

Hospitalization Ordered                                                                           

      Hospitalization Status: Observation                                                     kb  

      Provider: Domingo Mc  

      Condition: Stable                                                                       kb  

      Problem: new                                                                            kb  

      Symptoms: are unchanged                                                                 kb  

      Bed/Room Type: Standard                                                                 kb  

      Location: Telemetry/MedSurg (observation)(21 07:32)                               bd  

      Room Assignment: 405(21 07:32)                                                    bd  

      Diagnosis                                                                                   

        - Diarrhea, unspecified                                                               kb  

        - Nausea with vomiting, unspecified                                                   kb  

      Forms:                                                                                      

        - Medication Reconciliation Form                                                      kb  

        - SBAR form                                                                           kb  

Signatures:                                                                                       

Dispatcher [employer]                           EDMS                                                 

Lenka Amaro FNP-C                 FNALTHEA-Eileen Sarmiento Martha, RN RN mw Rittger, Kevin, MD MD   [facility]                                                  

Vilma Kincaid RN                     RN   aa5                                                  

Sunday Mosher PA                        PA   [id number]                                                  

Cristopher Bob RN                    RN   jd3                                                  

Arpita Garcia                                df1                                                  

                                                                                                  

Corrections: (The following items were deleted from the chart)                                    

                                                                                             

18:38 17:34 Abdomen Pelvis W [id number] ordered. EDMS                                     EDMS

22:52 21:15 Telemetry/MedSurg (observation) kb                                                mw  

22:52 21:15 kb                                                                                mw  

                                                                                             

07:32  22:52 [facility] ER HOLD mw                                                             bd  

                                                                                             

07:32  22:52 ERHOLD- mw                                                                  bd  

                                                                                                  

**************************************************************************************************

## 2021-11-22 NOTE — XMS REPORT
Continuity of Care Document

                          Created on:2021



Patient:JORDAN VERDIN JR

Sex:Male

:1985

External Reference #:[id number]





Demographics







                          Address                   [address]



                                                    [place] [address]

 

                          Home Phone                [phone]

 

                          Work Phone                [phone]

 

                          Mobile Phone              [phone]

 

                          Email Address             DECLINED

 

                          Preferred Language        English

 

                          Marital Status            Unknown

 

                          Restorationist Affiliation     Unknown

 

                          Race                      Unknown

 

                          Additional Race(s)        Black or 



                                                    Unavailable

 

                          Ethnic Group              Unknown









Author







                          Organization              [facility]

t

 

                          Address                   [address]



                                                    [place] [address]

 

                          Phone                     [phone]









Support







                Name            Relationship    Address         Phone

 

                Chito          Unavailable     [phone]



                                                [place] [address] 

 

                Chito          Unavailable     Unavailable     [phone]

 

                Chito Jr       Unavailable     [phone]

[phone]



                                                [place] [address] 

 

                Chito          Mother          [address] [phone]



                                                [place] [address] 

 

                one else per patient Unavailable     Unavailable     Unavailable

 

                Chito          Mother          [address] [phone]

[phone]



                                                [place] [address] 









Care Team Providers







                    Name                Role                Phone

 

                    Sharpless           Primary Care Physician [phone]

 

                    Daisy JACOBSON,  F    Attending Clinician [phone]

 

                    DAISY  F        Attending Clinician Unavailable

 

                    SILVESTRE             Attending Clinician Unavailable

 

                    MELISSARALPH         Attending Clinician Unavailable

 

                    RANDALL              Admitting Clinician Unavailable

 

                    MELISSARALPH ERAZO         Admitting Clinician Unavailable









Payers







           Payer Name Policy Type Policy Number Effective Date Expiration Date S

ource







Advance Directives







           Directive  Decision   Effective  Termination Comments   Source



                                 Date       Date                  

 

           Healthcare Agents on N/A                                         Univ

ersity



           FileNameRelationMer[facility] Hospital[facility]                                       

      of [place]



           Agent                                                  Medical



           RelationshipCommunicationArt[facility]                                      

       Branch



           [facility]                                             



           [phone]                                             



           (Mobile)[phone] (Work)                                          

   







Problems







       Condition Condition Condition Status Onset  Resolution Last   Treating Co

mments 

Source



       Name   Details Category        Date   Date   Treatment Clinician        



                                                 Date                 

 

       Hyperglyce Hyperglyce Disease Active                              C

[facility]



       jarvis    jarvis                  07                               [facility]



       without without               00:00:                             Medical



       ketosis ketosis               00                                 Center

 

       HEADACHE        Diagnosis Active 2018               M

[facility]



                                   1-08          09:20:00               l



                HEADACHE               00:00:                             [facility]

n



                                   00                                 



                                                                      



               Active                                                  



                                                                      



                                                                      



                                                                      



                                                                      



                                                                      



                                                                      



                                                                      



                                                                      



                                                                      



                                                                      



                    [facility]                                                  



                                                                      



                                                                      

 

       SHUNT         Diagnosis Active 2018               Mem

oria



       MALFUNCTIO                      -          20:00:00               l



       N        SHUNT               00:00:                             Jose



              MALFUNCTIO               00                                 



              N                                                       



                                                                      



                 Active                                                  



                                                                      



                                                                      



              2018                                                  



                                                                      



                                                                      



                                                                      



                                                                      



                                                                      



                                                                      



                                                                      



                                                                      



                     [facility]                                                  



                                                                      



                                                                      

 

       ACUTE         Diagnosis Active 2018               Mem

oria



       HEADACHE                                09:20:00               l



                ACUTE               00:00:                             [place]



              HEADACHE               00                                 



                                                                      



                                                                      



              Active                                                  



                                                                      



                                                                      



              2018                                                  



                                                                      



                                                                      



                                                                      



                                                                      



                                                                      



                                                                      



                                                                      



                                                                      



                     [facility]                                                  



                                                                      



                                                                      

 

       Spina  Spina  Disease Active                              [facility]



       bifida bifida                                              [facility]



                                   00:00:                             Medical



                                   00                                 [facility]

 

       Pyelonephr Pyelonephr Disease Active                              C

[facility]



       itis   itis                                                [facility]



                                   00:00:                             Medical



                                   00                                 [facility]

 

       Morbid Morbid Disease Active                              [facility]



       obesity obesity               1-04                               ity of



       with body with body               00:00:                             [place]

s



       mass index mass index               00                                 Me

dical



       of 50 or of 50 or                                                  Branch



       higher higher                                                  

 

       Spina         Problem                      2019               Memor

ia



       bifida,                                    14:14:02               l



       unspecifie   Spina                                                  [place]

nn



       d      bifida,                                                  



              unspecifie                                                  



              d                                                       



                                                                      



                                                                      



                                                                      



                                                                      



                                                                      



                                                                      



                                                                      



                                                                      



              2019                                                  



                                                                      



                                                                      



                                                                      



                                                                      



                 [facility]                                                  



                                                                      



                                                                      

 

       Nausea        Problem                      2019               Mem[facility]

ia



       with                                      14:14:02               l



       vomiting,   Nausea                                                  [place]

nn



       unspecifie with                                                    



       d      vomiting,                                                  



              unspecifie                                                  



              d                                                       



                                                                      



                                                                      



                                                                      



                                                                      



                                                                      



                                                                      



                                                                      



                                                                      



              2019                                                  



                                                                      



                                                                      



                                                                      



                                                                      



                 [facility]                                                  



                                                                      



                                                                      

 

       Diplopia        Problem                      2019               Mem

oria



                                                 14:14:02               l



                Diplopia                                                  Miguel

n



                                                                      



                                                                      



                                                                      



                                                                      



                                                                      



                                                                      



                                                                      



                                                                      



                                                                      



              2019                                                  



                                                                      



                                                                      



                                                                      



                                                                      



                 [facility]                                                  



                                                                      



                                                                      

 

       Cerebral        Problem                      2019               Mem

oria



       palsy,                                    14:14:02               l



       unspecifie   Cerebral                                                  He

rmann



       d      palsy,                                                  



              unspecifie                                                  



              d                                                       



                                                                      



                                                                      



                                                                      



                                                                      



                                                                      



                                                                      



                                                                      



                                                                      



              2019                                                  



                                                                      



                                                                      



                                                                      



                                                                      



                 [facility]                                                  



                                                                      



                                                                      

 

       Acquired        Problem                      2019               Mem

oria



       absence of                                    14:14:02               l



       other    Acquired                                                  Miguel

n



       specified absence of                                                  



       parts of other                                                   



       digestive specified                                                  



       tract  parts of                                                  



              digestive                                                  



              tract                                                   



                                                                      



                                                                      



                                                                      



                                                                      



                                                                      



                                                                      



                                                                      



                                                                      



                                                                      



              2019                                                  



                                                                      



                                                                      



                                                                      



                                                                      



                 [facility]                                                  



                                                                      



                                                                      

 

       Nicotine        Problem                      2019               Mem

oria



       dependence                                    14:14:02               l



       ,        Nicotine                                                  Miguel

n



       cigarettes dependence                                                  



       ,      ,                                                       



       uncomplica cigarettes                                                  



       huma    ,                                                       



              uncomplica                                                  



              huma                                                     



                                                                      



                                                                      



                                                                      



                                                                      



                                                                      



                                                                      



                                                                      



                                                                      



              2019                                                  



                                                                      



                                                                      



                                                                      



                                                                      



                 [facility]                                                  



                                                                      



                                                                      

 

       Presence        Problem                      2019               Mem

oria



       of                                        14:14:02               l



       cerebrospi   Presence                                                  He

rmann



       nal fluid of                                                      



       drainage cerebrospi                                                  



       device nal fluid                                                  



              drainage                                                  



              device                                                  



                                                                      



                                                                      



                                                                      



                                                                      



                                                                      



                                                                      



                                                                      



                                                                      



                                                                      



              2019                                                  



                                                                      



                                                                      



                                                                      



                                                                      



                 [facility]                                                  



                                                                      



                                                                      

 

       Allergy        Problem                      2019               [facility]

rajeev



       status to                                    14:14:02               l



       other    Allergy                                                  Jose



       antibiotic status to                                                  



       agents other                                                   



       status antibiotic                                                  



              agents                                                  



              status                                                  



                                                                      



                                                                      



                                                                      



                                                                      



                                                                      



                                                                      



                                                                      



                                                                      



                                                                      



              2019                                                  



                                                                      



                                                                      



                                                                      



                                                                      



                 [facility]                                                  



                                                                      



                                                                      

 

       Allergy        Problem                      2019               [facility]

rajeev



       status to                                    14:14:02               l



       other    Allergy                                                  [place]



       drugs, status to                                                  



       medicament other                                                   



       s and  drugs,                                                  



       biological medicament                                                  



       substances s and                                                   



       status biological                                                  



              substances                                                  



              status                                                  



                                                                      



                                                                      



                                                                      



                                                                      



                                                                      



                                                                      



                                                                      



                                                                      



                                                                      



              2019                                                  



                                                                      



                                                                      



                                                                      



                                                                      



                 [facility]                                                  



                                                                      



                                                                      

 

       Allergy        Problem                      2019               [facility]

rajeev



       status to                                    14:14:02               l



       narcotic   Allergy                                                  [place]

nn



       agent  status to                                                  



       status narcotic                                                  



              agent                                                   



              status                                                  



                                                                      



                                                                      



                                                                      



                                                                      



                                                                      



                                                                      



                                                                      



                                                                      



                                                                      



              2019                                                  



                                                                      



                                                                      



                                                                      



                                                                      



                 [facility]                                                  



                                                                      



                                                                      

 

       Asthma        Problem Resolve               2019               [facility]

rajeev



       (disorder)               d                    01:05:55               l



                Asthma                                                  Jose



              (disorder)                                                  



                                                                      



                                                                      



               Resolved                                                  



                                                                      



                                                                      



                                                                      



                                                                      



                Problem                                                  



                                                                      



                                                                      



              2019                                                  



                                                                      



                                                                      



                                                                      



                                                                      



                 [facility]                                                  



                                                                      



                                                                      

 

       Bronchitis        Problem Resolve               2019               

[facility]



       (disorder)               d                    01:05:55               l



                                                                      [place]



              Bronchitis                                                  



              (disorder)                                                  



                                                                      



                                                                      



               Resolved                                                  



                                                                      



                                                                      



                                                                      



                                                                      



                Problem                                                  



                                                                      



                                                                      



              2019                                                  



                                                                      



                                                                      



                                                                      



                                                                      



                 [facility]                                                  



                                                                      



                                                                      

 

       Cerebral        Problem Resolve               2019               Me

moria



       palsy                d                    01:05:55               l



       (disorder)   Cerebral                                                  He

rmann



              palsy                                                   



              (disorder)                                                  



                                                                      



                                                                      



               Resolved                                                  



                                                                      



                                                                      



                                                                      



                                                                      



                Problem                                                  



                                                                      



                                                                      



              2019                                                  



                                                                      



                                                                      



                                                                      



                                                                      



                 [facility]                                                  



                                                                      



                                                                      

 

       Hydrocepha        Problem Resolve               2019               

[facility]



       ozzy                  d                    01:05:55               l



       (disorder)                                                         Miguel

n



              Hydrocepha                                                  



              ozzy                                                     



              (disorder)                                                  



                                                                      



                                                                      



               Resolved                                                  



                                                                      



                                                                      



                                                                      



                                                                      



                Problem                                                  



                                                                      



                                                                      



              2019                                                  



                                                                      



                                                                      



                                                                      



                                                                      



                 [facility]                                                  



                                                                      



                                                                      

 

       Osteomyeli        Problem Resolve               2019               

[facility]



       tis                  d                    01:05:55               l



       (disorder)                                                         Miguel

n



              Osteomyeli                                                  



              tis                                                     



              (disorder)                                                  



                                                                      



                                                                      



               Resolved                                                  



                                                                      



                                                                      



                                                                      



                                                                      



                Problem                                                  



                                                                      



                                                                      



              2019                                                  



                                                                      



                                                                      



                                                                      



                                                                      



                 [facility]                                                  



                                                                      



                                                                      

 

       Acute pain        Problem Active               2019               M

[facility]



       (finding)                                    01:05:55               l



                Acute                                                  [place]



              pain                                                    



              (finding)                                                  



                                                                      



                                                                      



              Active                                                  



                                                                      



                                                                      



                                                                      



                                                                      



              Problem                                                  



                                                                      



                                                                      



              2019                                                  



                                                                      



                                                                      



                                                                      



                                                                      



                 [facility]                                                  



                                                                      



                                                                      

 

       Headache        Problem Active               2019               Mem

oria



       (finding)                                    01:05:55               l



                Headache                                                  Miguel

n



              (finding)                                                  



                                                                      



                                                                      



              Active                                                  



                                                                      



                                                                      



                                                                      



                                                                      



              Problem                                                  



                                                                      



                                                                      



              2019                                                  



                                                                      



                                                                      



                                                                      



                                                                      



                 [facility]                                                  



                                                                      



                                                                      







History of Past Illness







       Condition Condition Condition Status Onset  Resolution Last   Treating Co

mments 

Source



       Name   Details Category        Date   Date   Treatment Clinician        



                                                 Date                 

 

       Headache        Problem        2019-28              

 Memoria



                                   -08   14:14:02 14:14:02               l



                Headache               06:00:                             Miguel

n



                                   00                                 



                                                                      



                                                                      



                                                                      



                                                                      



              2018                                                  



                                                                      



                                                                      



                                                                      



                                                                      



                 [facility]                                                  



                                                                      



                                                                      







Allergies, Adverse Reactions, Alerts







       Allergy Allergy Status Severity Reaction(s) Onset  Inactive Treating Comm

ents 

Source



       Name   Type                        Date   Date   Clinician        

 

       Amoxicil Propensi Active        Hives  -                      [facility]

s



       bryn    ty to                       7-27                        ity of



              adverse                      00:00:                      [place]



              reaction                      00                          [facility]

 

       AMOXICIL DRUG   Active        Hives                        [facility]



       BRYN    INGREDI                      7-27                        ity of



                                          00:00:                      [place]



                                          00                          [facility]

 

       Metoclop Propensi Active        Nausea 2017                      [facility]

s



       ramide ty to                and/or 2-20                        ity of



       Hcl    adverse               Vomiting 00:00:                      [place]



              reaction                      00                          [facility]

 

       METOCLOP DRUG   Active        N/V    2017                      [facility]



       RAMIDE INGREDI                      2-20                        ity of



       HCL                                00:00:                      [place]



                                          00                          [facility]

 

       Sulfamet Propensi Active        Hives  2017-0                      [facility]



       hoxazole ty to                       6-11                        [facility]



       [facility] adverse                      00:00:                      [facility]



       oprim  reaction                      00                          Center



              s                                                       

 

       Levoflox Propensi Active        Hives  2017-0                      [facility]



       acin   ty to                       6-11                        [facility]



              adverse                      00:00:                      [facility]



              reaction                      00                          [facility]



              s                                                       

 

       SULFAMET Allergy Active High   Hives  2017-0                      [facility]



       HOXAZOLE                             6-11                        [facility]



       -[facility]                             00:00:                      [facility]



       OPRIM                              00                          Center

 

       LEVOFLOX Allergy Active High   Hives  2017-0                      [facility]



       ACIN                               6-11                        [facility]



                                          00[facility]00:                      [facility]



                                          00                          Center

 

       MORPHINE Allergy Active High   Hives  2017-0                      [facility]



                                          6-11                        [facility]



                                          00[facility]                      [facility]



                                          00                          Center

 

       KETOROLA Allergy Active High   Rash   2017-0                      [facility]



       C                                  6-11                        [facility]



                                          00[facility]00:                      [facility]



                                          00                          Center

 

       VANCOMYC Allergy Active High   Rash   2017-0                      [facility]



       IN                                 6-11                        [facility]



       ANALOGUE                             00:00:                      Medical



       S                                  00                          Center

 

       SESAME Allergy Active        Hives  2017-0                      [facility]



       SEED                               6-11                        [facility]



                                          00[facility]                      [facility]



                                          00                          Center

 

       Morphine Propensi Active        Hives  2017-0                      [facility]



              ty to                       6-11                        [facility]



              adverse                      00:00:                      [facility]



              reaction                      00                          [facility]



              s                                                       

 

       ONDANSET Allergy Active        N\T\V  2017-0                      [facility]



       EVIN HCL                             6-11                        [facility]



       (PF)                               00:00:                      Medical



                                          00                          Center

 

       Sesame Propensi Active        Hives  2017-0                      [facility]



       Seed   ty to                                               [facility]



              adverse                      00:00:                      Medical



              reaction                      00                          Center



              s                                                       

 

       Ketorola Propensi Active        Rash   0                      [facility]



       c      ty to                                               [facility]



              adverse                      00:00:                      Medical



              reaction                      00                          Center



              s                                                       

 

       Vancomyc Propensi Active        Rash                         [facility]



       in     ty to                                               [facility]



       Analogue adverse                      00:00:                      Medical



       s      reaction                      00                          Center



              s                                                       

 

       Ondanset Propensi Active        Nausea And 20170                      [facility]

[facility]



       evin Hcl ty to                Vomiting                         [facility]



       (Pf)   adverse                      00:00:                      Medical



              reaction                      00                          Center



              s                                                       

 

       Sulfa  Propensi Active        Other - See                       Uni

vers



       (Sulfona ty to                comments 1-                        ity of



       mide   adverse                      00:00:                      [place]



       Antibiot reaction                      00                          Medica

l



       ics)   s                                                       Branch

 

       Sulfamet Propensi Active        Other - See                       U

nivers



       hoprim ty to                comments 1-                        ity of



       Ds     adverse                      00:00:                      [place]



              reaction                                                Medical



              s                                                       Branch

 

       Vancomyc Propensi Active        Other - See 20170                      U

nivers



       in     ty to                comments 1-04                        ity of



              adverse                      00:00:                      [place]



              reaction                      00                          Medical



              s                                                       Branch

 

       SULFA  Drug   Active        Other-Cmnt 0                      Univer

s



       (SULFONA Class                       1-04                        ity of



       MIDE                               00:00:                      [place]



       ANTIBIOT                             00                          Medical



       ICS)                                                           Branch

 

       SULFAMET DRUG   Active        Other-Cmnt 0                      Univ

ers



       HOPRIM                             1-04                        ity of



       DS                                 00:00:                      [place]



                                                                    Medical



                                                                      Branch

 

       VANCOMYC DRUG   Active        Other-Cmnt 0                      Univ

ers



       IN     INGREDI                      1-04                        ity of



                                          00:00:                      [place]



                                                                    Medical



                                                                      Branch

 

       Sulfamet Propensi Active        Rash   0                      Univer

s



       hoxazole ty to                                               ity of



              adverse                      00:00:                      [place]



              reaction                                                Medical



              s                                                       Branch

 

       Ondanset Propensi Active        Nausea 0                      Univer

s



       evin Hcl ty to                and/or                         ity of



       (Pf)   adverse               Vomiting 00:00:                      [place]



              reaction                                                Medical



              s                                                       Branch

 

       SULFAMET DRUG   Active        Rash   0                      Univers



       HOXAZOLE INGREDI                                              ity of



                                          00:00:                      [place]



                                                                    Medical



                                                                      Branch

 

       ONDANSET DRUG   Active        N/V    0                      Univers



       EVIN HCL                             -                        ity of



       (PF)                               00:00:                      [place]



                                          00                          Medical



                                                                      Branch

 

       Levoflox Propensi Active        Hives                        Univer

s



       acin   ty to                                               ity of



              adverse                      00:00:                      [place]



              reaction                      00                          Medical



              s                                                       Branch

 

       Morphine Propensi Active        Hives                        Univer

s



              ty to                                               ity of



              adverse                      00:00:                      [place]



              reaction                      00                          Medical



              s                                                       Branch

 

       Ketorola Propensi Active        Nausea                       Univer

s



       c      ty to                and/or                         ity of



       Trometha adverse               Vomiting 00:00:                      [place]



       mine   reaction                      00                          Medical



              s                                                       Branch

 

       LEVOFLOX DRUG   Active        Hives                        Univers



       ACIN   INGREDI                                              ity of



                                          00:00:                      [place]



                                          00                          Medical



                                                                      Branch

 

       MORPHINE DRUG   Active        Hives                        Univers



              INGREDI                                              ity of



                                          00:00:                      [place]



                                          00                          Medical



                                                                      Branch

 

       KETOROLA DRUG   Active        N/V                          Univers



       C      INGREDI                                              ity of



       TROMETHA                             00:00:                      [place]



       MINE                               00                          Medical



                                                                      Branch

 

       Minocin Minocin Active                                           [facility]



                                                                      l



                                                                      Jose

 

       Zofran Zofran Active                                           [facility]



                                                                      l



                                                                      [facility]

 

       Levaquin Levaquin Active                                           [facility]

a



                                                                      l



                                                                      [place]

 

       Bactrim Bactrim Active                                           [facility]



                                                                      l



                                                                      [place]

 

       amoxicil amoxicil Active                                           [facility]

a



       bryn    bryn                                                     l



                                                                      [facility]

 

       morphine morphine Active                                           [facility]

a



                                                                      l



                                                                      [facility]

 

       Toradol Toradol Active                                           [facility]



                                                                      l



                                                                      [place]

 

       Morphine Adverse Active        Info Not                             [facility]

t



       Sulfate Reaction               Available                             [facility]

s -



       ER                                                             [facility]



                                                                      l



                                                                      Outpati



                                                                      ent



                                                                      Clinics

 

       Levaquin Adverse Active        Info Not                             [facility]

[facility]



              Reaction               Available                             [facility]

 -



                                                                      [facility]



                                                                      l



                                                                      Outpati



                                                                      ent



                                                                      Clinics

 

       Zofran Adverse Active        Info Not                             [facility]



              Reaction               Available                             [facility]

 -



                                                                      [facility]



                                                                      l



                                                                      Outpati



                                                                      ent



                                                                      Clinics







Social History







           Social Habit Start Date Stop Date  Quantity   Comments   Source

 

           Exposure to                       Not sure              [facility]



           SARS-CoV-2 (event)                                             [facility]

 

           History of tobacco                       Cigarette Smoker            

[facility] -



           use                                                    [facility]

 

           Cigarettes smoked 2017                       [facility] 

[facility]



           current (pack per 00:00:00   00:00:00                         Medical

 Center



           day) - Reported                                             

 

           Alcohol intake 2017 Current drinker            Unive

rsity of



                      00:00:00   00:00:00   of alcohol            [facility]



                                            (finding)             [place]

 

           Sex Assigned At 1985                       [facility]

y of



           Birth      00:00:00   00:00:[birth year]                         [facility]









                Smoking Status  Start Date      Stop Date       Source

 

                Current every day smoker 2017 00:00:00                 Uni

versity of [facility]







Medications







       Ordered Filled Start  Stop   Current Ordering Indication Dosage Frequency

 Signature

                    Comments            Components          Source



     Medication Medication Date Date Medication? Clinician                (SIG) 

          



     Name Name                                                   

 

     FENTanyl PF      2021- No             50ug      50 mcg,           Un

yoselyn



     (SUBLIMAZE      5-10 05-10                          Intravenou           it

y of



     (PF))      02:45: 01:34                          s, ONCE, 1           [place]



     injection      00   :00                           dose, Sun           Medic

al



     50 mcg                                         21 at           Branch



                                                  2145,           



                                                  Routine           

 

     cefTRIAXone      2021- No             1000mg      1,000 mg,         

  Univers



     (ROCEPHIN)      5-10 05-10                          IV             ity of



     1,000 mg in      02:30: 01:55                          Piggyback,          

 [place]



     NaCl 0.9%      00   :00                           ONCE, 1           Medical



     (NS) 50 mL                                         dose, [facility]

ch



     MINI-BAG                                         21 at           



                                                  2130, 50           



                                                  mL<br>Reas           



                                                  on for           



                                                  Anti-Infec           



                                                  tive:           



                                                  Documented           



                                                  Infection<           



                                                  br>Documen           



                                                  huma            



                                                  Infection           



                                                  Site:           



                                                  Urine<br>D           



                                                  uration of           



                                                  Therapy: 7           



                                                  days           

 

     famotidine       No             20mg      20 mg,           Univ

ers



     (PEPCID      5-10 05-10                          Slow IV           ity of



     (PF))      01:00: 00:12                          Push,           [place]



     injection      00   :00                           ONCE, 1           Medical



     20 mg                                         dose, [facility]



                                                  21 at           



                                                  2000, ASAP           

 

     proMETHazin      2021- No             25mg      25 mg, IV           

Univers



     e         5-10 05-10                          Piggyback,           ity of



     (PHENERGAN)      00:45: 00:11                          ONCE, 1           Te

xas



     25 mg in      00   :00                           dose, [place]           Medica

l



     NaCl 0.9%                                         21 at           [facility]

h



     (NS) 50 mL                                         1945, 50           



     piggyback                                         mL             

 

     FENTanyl PF      2021- No             50ug      50 mcg,           Un

yoselyn



     (SUBLIMAZE      5-10 05-10                          Intravenou           it

y of



     (PF))      00:45: 00:12                          s, ONCE, 1           [place]



     injection      00   :00                           dose, Sun           Medic

al



     50 mcg                                         21 at           Branch



                                                  1945,           



                                                  Routine           

 

     NaCl 0.9%      2021- No             1000mL      at 999           Uni

vers



     (NS) bolus      5-10 05-10                          mL/hr,           ity of



     infusion      00:00: 01:47                          1,000 mL,           Eric

as



     1,000 mL      00   :00                           IV             Medical



                                                  Infusion,           Branch



                                                  ONCE, 1           



                                                  dose, Sun           



                                                  21 at           



                                                  1900, ASAP           

 

     cefdinir      2021- No        [id number] 300mg      Take 1           U

nivers



     300 mg       05-20                          capsule by           ity of



     capsule      00:00: 04:59                          mouth 2           [place]



               00   :00                           (two)           Medical



                                                  times           Branch



                                                  daily for           



                                                  10 days.           

 

     buPROPion       No             150mg QD   Take 1           [facility]



     (WELLBUTRIN      -17 -16                          tablet           [facility]

 -



     XL) 150 MG      00:00: 23:59                          (150 mg           Med

ical



     24 hr      00   :00                           total) by           Center



     tablet                                         mouth           



                                                  daily.           

 

     citalopram       No             40mg QD   Take 1           [facility]



     (CELEXA) 40      -17 -16                          tablet (40           L

ukes -



     MG tablet      00:00: 23:59                          mg total)           Me

dical



               00   :00                           by mouth           Center



                                                  daily.           

 

     oxyCODONE-a            Yes            1{tbl}      Take 1           [facility]



     cetaminophe      1-16                               tablet by           Ni

es -



     n         14:44:                               mouth           Medical



     (PERCOCET)      50                                 every 4           Center



      mg                                         (four)           



     per tablet                                         hours as           



                                                  needed for           



                                                  Pain.           

 

     zinc            Yes            5g   Q.5D Apply 5 g           [facility]



     oxide-yuan      1-16                               topically           Ni

es -



     latum      00:00:                               2 (two)           Medical



     (CRITIC-AID      00                                 times           Center



     ) 20-51 %                                         daily.           



     Pste                                                        



     topical                                                        



     paste                                                        

 

     meropenem            Yes            1g        Inject 1 g           [facility]

[facility]



     (MERREM)      1-16                               intravenou           [facility]

 -



     MBP 1 gm in      00:00:                               sly every           M

edical



     100 mL NS      00                                 8 (eight)           [facility]

r



                                                  hours.           

 

     insulin      -      Yes            58U  Q.5D Inject 58           [facility]

t



     glargine      1-16                               Units           [facility]



     (LANTUS)      00:00:                               subcutaneo           Med

ical



     100 unit/mL      00                                 usly 2           Center



     injection                                         (two)           



                                                  times           



                                                  daily Use           



                                                  as             



                                                  directed.           

 

     insulin      2021- No             0U        Inject           [facility]



     lispro      1-16 -15                          0-12 Units           [facility] 

-



     (HUMALOG)      00:00: 23:59                          subcutaneo           M

edical



     100 unit/mL      00   :00                           usly 3           Center



     injection                                         (three)           



                                                  times           



                                                  daily           



                                                  before           



                                                  meals.           

 

     insulin      0 - No             25U       Inject 25           [facility]



     lispro      1-16 01-15                          Units           [facility] -



     (HUMALOG)      00:00: 23:59                          subcutaneo           M

edical



     100 unit/mL      00   :00                           usly 3           Center



     injection                                         (three)           



                                                  times           



                                                  daily           



                                                  before           



                                                  meals.           

 

     normal      2018      No                       1,000 mL,           [facility]

a



     saline 0.9%                                     Rate: 100           l



     IV 1,000 mL      11:04:                               ml/hr,           [facility]

jorge



                                                Infuse           



                                                  over: 10           



                                                  hr, Route:           



                                                  IV, Dosing           



                                                  Weight           



                                                  131.818           



                                                  kg, Total           



                                                  Volume:           



                                                  1,000,           



                                                  Start           



                                                  date:           



                                                  18           



                                                  5:04:00           



                                                  CST,           



                                                  Duration:           



                                                  30 day,           



                                                  Stop date:           



                                                  18           



                                                  5:03:00           



                                                  CST, 2.4,           



                                                  m2             

 

     Magnesium      2018      No                       Notes:           [facility]

a



     Sulfate                                     WASTE: F/P           l



               10:36:                               - Sink; E           [place]



                                                -              



                                                  Municipal           



                                                  Trash Bin           

 

     Isolyte S      2018      No                       Notes:           [facility]

a



     PH-7.4                                     (Same as:           l



     (Bolus) IV      10:36:                               Isolyte S           

rmann



                                                PH 7.4)           

 

     Phenergan      2018      No                       12.5 mg,           [facility]

raejev



               08                               0.5 mL,           l



               10:35:                               Route:           [facility]



               00                                 IVPB, Drug           



                                                  form: INJ,           



                                                  ONCE,           



                                                  Dosing           



                                                  Weight           



                                                  131.818,           



                                                  kg,            



                                                  Priority:           



                                                  STAT,           



                                                  Start           



                                                  date:           



                                                  18           



                                                  4:35:00           



                                                  CST, Stop           



                                                  date:           



                                                  18           



                                                  4:35:00           



                                                  CST            

 

     Citalopram Citalopram       Yes  [facility] [place]                1 tablet     

      [facility]



     Hydrobromid Hydrobromid 7-16                                              [facility]

[facility]



     [facility]    [facility]00:                                              [facility]



               00                                                [facility]



                                                                 Outpat[facility]



                                                                 ent



                                                                 Clinics

 

     Seroquel Seroquel       Yes  [facility] [place]                1 tablet         

  [facility]



               7-16                                              [facility]



               [facility]                                              [facility]



               00                                                [facility]



                                                                 Outpati



                                                                 ent



                                                                 Clinics

 

     Docusate            Yes                      100 mg = 1           [facility]

[facility]



     Sodium 100      5-08                               cap, PO,           l



     MG Oral      14:56:                               BID, 0           [facility]



     Capsule      00                                 Refill(s)           

 

     Zosyn            Yes                      0              [facility]



               5-08                               Refill(s)           l



               14:56:                                              [facility]



               00                                                

 

     celecoxib            Yes                      200 mg = 1           [facility]

[facility]



     200 mg oral      5-08                               cap, PO,           l



     capsule      14:56:                               BID, 0           [facility]



               00                                 Refill(s)           

 

     ascorbic      2018      Yes                      500 mg = 1           [facility]

[facility]



     acid      5-08                               tab, PO,           l



               14:56:                               BID, 0           [facility]



               00                                 Refill(s)           

 

     acetaminoph            Yes                      1,000 mg =           

[facility]



     en 500 mg      5-08                               2 tab, PO,           l



     oral tablet      14:56:                               Q6Hnow, 0           [facility]

[facility]



               00                                 Refill(s)           

 

     Oxycodone            Yes                      5 mg = 1           [facility]

[facility]



     Hydrochlori      5-08                               tab, PO,           l



     de 5 MG      14:56:                               Q4H, PRN           [facility]

n



     Oral Tablet      00                                 Pain Score           



                                                  4-6, 0           



                                                  Refill(s)           

 

     zinc            Yes                      220 mg = 1           [facility]



     sulfate 220      5-08                               cap, PO,           l



     mg oral      14:56:                               Daily, 0           [facility]

n



     capsule      00                                 Refill(s)           

 

     multivitami            Yes                      1 tab, PO,           

[facility]



     n         5-08                               Daily, 0           l



               14:56:                               Refill(s)           [facility]



               00                                                

 

     methocarbam            Yes                      1,000 mg =           

[facility]



     ol 500 mg      5-08                               2 tab, PO,           l



     oral tablet      14:56:                               Q8H, 0           [facility]

j[facility]



                                                Refill(s)           

 

     LORazepam            Yes                      0.5 mg = 1           [facility]

[facility]



     0.5 mg oral      5-08                               tab, PO,           l



     tablet      14:56:                               Q8H, PRN           [facility]



               00                                 Anxiety, 0           



                                                  Refill(s)           

 

     Lidocaine            Yes                      3 patch,           [facility]

[facility]



     Hydrochlori      5-08                               TOP,           l



     de 0.05      14:56:                               Daily,           [facility]



     MG/MG      00                                 Remove           



     Transdermal                                         after 12           



     Patch                                         hours, 0           



     [L[facility]oderm]                                         Refill(s)           

 

     Robaxin            No                       Notes:           [facility]



               5-06                               (Same           l



               21:00:                               as:Robaxin           [facility]



               00                                 )              

 

     Oxycodone            No                       Notes:           [facility]

a



     Hydrochlori      -06                               (Same as:           l



     de 5 MG      18:06:                               Roxicodone           [facility]

j[facility]



     Oral Tablet      00                                 )              

 

     Ativan            No                       Notes:           [facility]



               5-06                               (Same as:           l



               15:18:                               Ativan)           [facility]



                                                               

 

     Trazodone            No                       Notes:           [facility]

a



     Hydrochlori      5-06                               (Same As:           l



     de 50 MG      02:00:                               Desyrel)           [facility]

nn



     Oral Tablet      00                                                

 

     remove            No                       Notes:           [facility]



     patch      5-06                               Remove           l



               02:00:                               patch 12           [facility]



               00                                 hours           



                                                  after           



                                                  applicatio           



                                                  n each           



                                                  day.           

 

     Oxycodone            No                       Notes:           [facility]

a



     Hydrochlori      5-05                               (Same as:           l



     de 5 MG      22:01:                               Roxicodone           [facility]

j[facility]



     Oral Tablet      00                                 )              

 

     Celebrex            No                       Notes:           [facility]



               5-05                               NSAID.           l



               22:00:                               Please           [facility]



               00                                 check           



                                                  indication           



                                                  . Not for           



                                                  seizure.           



                                                  (Same As:           



                                                  CeleBREX)           

 

     Vancomycin            No                        2001 mg:           [facility]

[facility]



               5-05                               infuse           l



               21:00:                               over 2.5           [facility]



               00                                 hours           

 

     Lidocaine            No                       Notes:           [facility]

a



     Hydrochlori      5-05                               Apply only           l



     de 0.05      14:00:                               once for           [facility]

n



     MG/MG      00                                 up to 12           



     Transdermal                                         hours in a           



     Patch                                         24-hour           



     [Lidoderm]                                         period (12           



                                                  hours on           



                                                  and 12           



                                                  hours           



                                                  off).           



                                                  (Same as:           



                                                  Lidoderm)           



                                                  "Remove           



                                                  old patch           



                                                  before           



                                                  applicatio           



                                                  n of new           



                                                  patch"           

 

     Phenergan            No                       Notes: Do           [facility]

[facility]



               5-04                               not give           l



               22:40:                               IV push.                                            (Same as:           



                                                  Phenergan)           

 

     Dilaudid            No                       Notes:           [facility]



               5-04                               Same as           l



               22:40:                               Dilaudid                                                           

 

     Tramadol            No                       Notes: Not           [facility]

[facility]



               5-04                               to exceed           l



               22:00:                               400mg/day.           [facility]



               00                                 (Same As:           



                                                  Ultram)           

 

     gabapentin            No                       Notes:           [facility]

[facility]



               5-04                               (Same as:           l



               22:00:                               Neurontin)                                                           

 

     Acetaminoph            No                       Notes: Max           

[facility]



     en        5-04                               acetaminop           l



               22:00:                               hen 4000           [facility]                                 mg/day (4           



                                                  gm/day).           



                                                  (Same as:           



                                                  Tylenol           



                                                  Extra           



                                                  Strength)           

 

     Robaxin            No                       Notes:           [facility]



               5-04                               (Same           l



               22:00:                               as:Robaxin           [facility]



                                                )              

 

     Oxycodone            No                       Notes:           [facility]

a



     Hydrochlori      5-04                               (Same as:           l



     de 5 MG      21:33:                               Roxicodone           [facility]

j[facility]



     Oral Tablet      00                                 )              

 

     Beneprotein            No                       Notes:           [facility]

[facility]



     7 gm pkt      -                               (Same as:           l



               21:30:                               Beneprotei           [facility]



               00                                 n)             

 

     Acetaminoph            No                       1 tab, PO,           

[facility]



     en 325 MG /      5-04                               TID, 0           l



     Oxycodone      17:12:                               Refill(s)           [facility]

[facility]



     Hydrochlori      00                                                



     de 10 MG                                                        



     Oral Tablet                                                        



     [Percocet                                                        



     10/325]                                                        

 

     Dilaudid            No                       0.5 mg,           [facility]

a



               5-04                               0.25 mL,           l



               16:01:                               Route:                                            IVP, Drug           



                                                  form: INJ,           



                                                  ONCE,           



                                                  Start           



                                                  date:           



                                                  18           



                                                  11:01:00           



                                                  CDT, Stop           



                                                  date:           



                                                  18           



                                                  11:01:00           



                                                  CDT            

 

     Phenergan            No                       Notes:           [facility]

a



               -                               (Same as:           l



               15:43:                               Phenergan)                                                           

 

     Dilaudid            No                       2 mg,           [facility]



               5-04                               Route:           l



               15:43:                               IVP, ONCE,                                            Dosing           



                                                  Weight           



                                                  127.027,           



                                                  kg,            



                                                  Priority:           



                                                  STAT,           



                                                  Start           



                                                  date:           



                                                  18           



                                                  10:43:00           



                                                  CDT, Stop           



                                                  date:           



                                                  18           



                                                  10:43:00           



                                                  CDT            

 

     Docusate            No                       Notes:           [facility]



               5-04                               (Same as:           l



               14:00:                               Colace)                                            (Do Not           



                                                  Crush)           

 

     Zinc            No                       Notes:           [facility]



     Sulfate      -                               (Zinc           l



               14:00:                               sulfate                                            capsule) -           



                                                  220 mg           



                                                  Zinc           



                                                  sulfate =           



                                                  50 mg           



                                                  elemental           



                                                  zinc  Same           



                                                  as Zinc           



                                                  Sulfate           

 

     ascorbic            No                       Notes:           [facility]



     acid      5-04                               (Same as:           l



               14:00:                               Vitamin C)                                                           

 

     multivitami            No                       Notes:           [facility]

[facility]



     n         5-04                               (Same           l



               14:00:                               as:Thera)                                            WASTE: F/P           



                                                  - Black; E           



                                                  -              



                                                  Municipal           



                                                  Trash Bin           



                                                  Take with           



                                                  food.           

 

     Naproxen            No                       Notes:           [facility]



               5-04                               (Same as:           l



               07:00:                               Naprosyn)                                            Take with           



                                                  food.           

 

     Zosyn            No                       Notes:           [facility]



               5-04                               (Same as:           l



               07:00:                               Zosyn)                                            Dosing           



                                                  based on           



                                                  Piperacill           



                                                  in             



                                                  component           



                                                   ***           



                                                  MEDICATION           



                                                  WASTE ***           



                                                  Product           



                                                  Size:           



                                                  3375 mg           



                                                  Product           



                                                  Wasted:           



                                                  ___ mg           

 

     Vancomycin            No                         mg:           [facility]

[facility]



               5-04                               infuse           l



               07:00:                               over 2.5           [facility]



               00                                 hours  For           



                                                  adult           



                                                  patients           



                                                  only:           



                                                  Round to           



                                                  nearest           



                                                  250 mg per           



                                                  Medical           



                                                  Staff           



                                                  approval           



                                                  ***            



                                                  MEDICATION           



                                                  WASTE ***           



                                                  Product           



                                                  Size:           



                                                  1000 mg           



                                                  Product           



                                                  Wasted:           



                                                  ___ mg           

 

     Enoxaparin            No                       Notes:           [facility]

[facility]



               -                               (Same as:           l



               07:00:                               Lovenox)                                                           

 

     Sodium            No                       1,000 mL,           [facility]

a



     Chloride                                     Rate: 125           l



     0.9% IV      06:46:                               ml/hr,           [facility]



     1,000 mL      00                                 Infuse           



                                                  over: 8           



                                                  hr, Route:           



                                                  IV, Dosing           



                                                  Weight           



                                                  127.27 kg,           



                                                  Total           



                                                  Volume:           



                                                  1,000,           



                                                  Start           



                                                  date:           



                                                  18           



                                                  1:46:00           



                                                  CDT,           



                                                  Duration:           



                                                  30 day,           



                                                  Stop date:           



                                                  18           



                                                  1:45:00           



                                                  CDT, 2.44,           



                                                  m2             

 

     Saline            No                       Notes:           [facility]



     Flush 0.9%                                     (Same as:           l



               06:46:                               BD             [facility]                                 Posiflush)           

 

     Acetaminoph            No                       Notes: Do           M

[facility]



     en        -04                               not exceed           l



               06:46:                               4 gm/day.                                            (Same as:           



                                                  Tylenol)           

 

     Acetaminoph            No                       Notes:           [facility]

rajeev



     en 325 MG /      5-04                               (Same as:           l



     Hydrocodone      06:46:                               Norco           [facility]

nn



     Bitartrate      00                                 325/5)  Do           



     5 MG Oral                                         not exceed           



     Tablet                                         4gm/day of           



                                                  acetaminop           



                                                  hen.           

 

     Reglan            No                       Notes:           [facility]



               5-04                               (Same as:           l



               04:27:                               Reglan)                                                           

 

     Benadryl            No                       Notes:           [facility]



               5-04                               (Same as:           l



               04:27:                               Benadryl)                                                           

 

     Magnesium            No                       Notes:           [facility]

a



     Sulfate                                     WASTE: F/P           l



               04:26:                               - Sink; E           [facility]                                 -              



                                                  Municipal           



                                                  Trash Bin           

 

     Sodium            No                       1,000 mL,           [facility]

a



     Chloride      04                               1000           l



     0.9%      04:26:                               ml/hr,           [facility]



     (Bolus) IV      00                                 Infuse           



                                                  Over: 1           



                                                  hr, Route:           



                                                  IV, 1,000,           



                                                  Drug form:           



                                                  INJ, ONCE,           



                                                  Priority:           



                                                  STAT,           



                                                  Dosing           



                                                  Weight           



                                                  127.273           



                                                  kg, Start           



                                                  date:           



                                                  18           



                                                  23:26:00           



                                                  CDT, Stop           



                                                  date:           



                                                  18           



                                                  23:26:00           



                                                  CDT            

 

     Zosyn            No                       4.5 gm,           [facility]



                                              Route:           l



               04:10:                               IVPB,           [facility]



               00                                 ONCE,           



                                                  Dosing           



                                                  Weight           



                                                  127.273,           



                                                  kg,            



                                                  Priority:           



                                                  STAT,           



                                                  Start           



                                                  date:           



                                                  18           



                                                  23:10:00           



                                                  CDT, Stop           



                                                  date:           



                                                  18           



                                                  23:10:00           



                                                  CDT, ABX           



                                                  Indication           



                                                  :              



                                                  Bacteremia           

 

     Vancomycin            No                         mg:           Me

moria



                                              infuse           l



               04:10:                               over 2.5           [facility]



               00                                 hours  For           



                                                  adult           



                                                  patients           



                                                  only:           



                                                  Round to           



                                                  nearest           



                                                  250 mg per           



                                                  Medical           



                                                  Staff           



                                                  approval           



                                                  ***            



                                                  MEDICATION           



                                                  WASTE ***           



                                                  Product           



                                                  Size:           



                                                  1000 mg           



                                                  Product           



                                                  Wasted:           



                                                  ___ mg           

 

     normal            No                       1,000 mL,           [facility]

a



     saline 0.9%                                     Rate: 75           l



     IV 1,000 mL      03:39:                               ml/hr,           [facility]

jorge



                                                Infuse           



                                                  over: 13.3           



                                                  hr, Route:           



                                                  IV, Dosing           



                                                  Weight           



                                                  127.273           



                                                  kg, Total           



                                                  Volume:           



                                                  1,000,           



                                                  Start           



                                                  date:           



                                                  18           



                                                  22:39:00           



                                                  CDT,           



                                                  Duration:           



                                                  30 day,           



                                                  Stop date:           



                                                  18           



                                                  22:38:00           



                                                  CDT, 2.36,           



                                                  m2             

 

     Acetaminoph            No                       Notes: Max           

[facility]



     en                                       acetaminop           l



               02:56:                               hen 4000           [facility]



               00                                 mg/day (4           



                                                  gm/day).           



                                                  (Same as:           



                                                  Tylenol           



                                                  Extra           



                                                  Strength)           

 

     Rocephin            No                       Notes:           [facility]



                                              (Same As:           l



               02:21:                               Rocephin).           [facility]



               00                                   ***           



                                                  MEDICATION           



                                                  WASTE ***           



                                                  Product           



                                                  Size:           



                                                  1000 mg           



                                                  Product           



                                                  Wasted:           



                                                  _0__ mg           

 

     Morphine            No                       4 mg,           [facility]



                                              Route:           l



               00:05:                               IVP, ONCE,           [facility]



               00                                 Dosing           



                                                  Weight           



                                                  127.273,           



                                                  kg,            



                                                  Priority:           



                                                  STAT,           



                                                  Start           



                                                  date:           



                                                  18           



                                                  19:05:00           



                                                  CDT, Stop           



                                                  date:           



                                                  18           



                                                  19:05:00           



                                                  CDT            

 

     Benadryl            No                       Notes:           [facility]



               5-03                               (Same as:           l



               23:14:                               Benadryl)                                                           

 

     Benadryl            No                       Notes:           [facility]



               5-03                               (Same as:           l



               22:56:                               Benadryl)                                                           

 

     Morphine            No                       4 mg, 1           [facility]

a



               5-03                               mL, Route:           l



               22:56:                               IVP, Drug                                            form:           



                                                  SOLN,           



                                                  ONCE,           



                                                  Dosing           



                                                  Weight           



                                                  127.273,           



                                                  kg,            



                                                  Priority:           



                                                  STAT,           



                                                  Start           



                                                  date:           



                                                  18           



                                                  17:56:00           



                                                  CDT, Stop           



                                                  date:           



                                                  18           



                                                  17:56:00           



                                                  CDT            

 

     OXYCODONE      2017      Yes                      Take  by           Univ

ers



     HCL/ACETAMI      2-20                               mouth.           [facility] of



     NOPHEN      10:03:                                              [place]



     (PERCOCET      17                                                Medical



     ORAL)                                                        Branch

 

     dicyclomine      2017      Yes            20mg      Take 1           Univ

ers



     (BENTYL) 20      2-20                               tablet by           ity

 of



     mg tablet      00:00:                               mouth 4           [place]



               00                                 (four)           Medical



                                                  times           Branch



                                                  daily.           

 

     proMETHazin      2017      Yes            25mg      Take 1           Univ

ers



     e 25 mg      2-20                               tablet by           ity of



     tablet      00:00:                               mouth           [place]



               00                                 every 6           Medical



                                                  (six)           Branch



                                                  hours as           



                                                  needed for           



                                                  Nausea and           



                                                  Vomiting           



                                                  (N/V).           

 

     proMETHazin            Yes            25mg      Take 1           Univ

ers



     e 25 mg      1-04                               tablet by           ity of



     tablet      00:00:                               mouth           [place]



               00                                 every 6           Medical



                                                  (six)           Branch



                                                  hours as           



                                                  needed for           



                                                  Nausea and           



                                                  Vomiting           



                                                  (N/V) for           



                                                  up to 12           



                                                  doses.           

 

     Tylenol Tylenol           Yes  [facility] [place]                1 tablet           [facility]

[facility]



     with with                                    as needed           [facility]



     Codeine #4 Codeine #4                                                   [facility]

[facility]



                                                                 l



                                                                 [facility]



                                                                 ent



                                                                 Clinics

 

     Macrobid Macrobid           Yes  [facility] [place]                1 capsule          

 [facility]



                                                  with food           [facility]



                                                                 [facility]



                                                                 l



                                                                 [facility]



                                                                 [facility]



                                                                 Clinics

 

     Pantoprazol Pantoprazol           Yes  [facility] [place]                1 tablet     

      [facility]



     e Sodium e Sodium                                                   [facility]



                                                                 [facility]



                                                                 l



                                                                 [facility]



                                                                 ent



                                                                 Clinics

 

     Collagenase Collagenase           Yes  [facility] [place]                1            

  [facility]



                                                  applicatio           [facility]



                                                  n to           [facility]



                                                  affected           l



                                                  area           [facility]



                                                                 ent



                                                                 Clinics







Vital Signs







             Vital Name   Observation Time Observation Value Comments     Source

 

             Systolic blood 2021-05-10 01:30:00 163 mm[Hg]                [facility]

[facility]



             pressure                                            [facility]



                                                                 [facility]

 

             Diastolic blood 2021-05-10 01:30:00 106 mm[Hg]                [facility]

[facility] of



             pressure                                            [facility]



                                                                 Branch

 

             Heart rate   2021-05-10 01:30:00 97 /min                   [facility]

[facility] of



                                                                 [facility]



                                                                 [facility]anch

 

             Respiratory rate 2021-05-10 01:30:00 21 /min                   [facility]

ersity of



                                                                 [facility]



                                                                 Branch

 

             Oxygen saturation in 2021-05-10 01:30:00 97 /min                   

[facility]



             Arterial blood by                                        [facility]

[facility]



             Pulse oximetry                                        Branch

 

             Body weight  2021 23:27:00 136.079 kg                [facility]

[facility] of



                                                                 [facility]



                                                                 [facility]anch

 

             BMI          2021 23:27:00 60.59 kg/m2               [facility]

ty of



                                                                 [facility]



                                                                 Branch

 

             Body temperature 2021 23:27:00 36.83 Tiffanie                 [facility]iv

ersity of



                                                                 [facility]



                                                                 Branch

 

             Body height  2021 23:27:00 149.9 cm                  [facility]

ty of



                                                                 [facility]



                                                                 Branch

 

             Systolic blood 2021-05-10 01:30:00 163 mm[Hg]                [facility]

[facility]ity of



             pressure                                            [facility]



                                                                 Branch

 

             Diastolic blood 2021-05-10 01:30:00 106 mm[Hg]                [facility]

rsity of



             pressure                                            [facility]



                                                                 Branch

 

             Heart rate   2021-05-10 01:30:00 97 /min                   [facility]

ty of



                                                                 [facility]



                                                                 Branch

 

             Respiratory rate 2021-05-10 01:30:00 21 /min                   [facility]iv

ersity of



                                                                 [facility]



                                                                 Branch

 

             Oxygen saturation in 2021-05-10 01:30:00 97 /min                   

[facility]



             Arterial blood by                                        [facility]

[facility]



             Pulse oximetry                                        Branch

 

             Body weight  2021 23:27:00 136.079 kg                [facility]

ty of



                                                                 [facility]



                                                                 Branch

 

             BMI          2021 23:27:00 60.59 kg/m2               [facility]

ty of



                                                                 [facility]



                                                                 Branch

 

             Body temperature 2021 23:27:00 36.83 Tiffanie                 Univ

ersity of



                                                                 [facility]



                                                                 Branch

 

             Body height  2021 23:27:00 149.9 cm                  [facility]

ty of



                                                                 [facility]



                                                                 Branch

 

             Respitory Rate 2018 17:30:00                           Siri Ernst

 

             Systolic (mm Hg) 2018 17:30:00                           [facility]

 

             Diastolic (mm Hg) 2018 17:30:00                           [facility]

sabina [facility]

 

             Temperature Oral (F) 2018 17:30:00 98.4 F                    

[facility]

 

             Temperature Oral (F) 2018 16:23:00 98.6 F                    

[facility]

 

             Systolic (mm Hg) 2018 16:23:00                           [facility]

[facility]

 

             Diastolic (mm Hg) 2018 16:23:00                           [facility]

[facility]

 

             Respitory Rate 2018 14:00:00                           [facility]

al [facility]

 

             Systolic (mm Hg) 2018 14:00:00                           [facility]

[facility]

 

             Diastolic (mm Hg) 2018 14:00:00                           [facility]

[facility]

 

             Respitory Rate 2018 13:30:00                           [facility]

al [facility]

 

             BMI Calculated 2018 10:16:00                           [facility]

[facility]

 

             Height       2018 10:16:00 149.86 cm                 [facility]

 [facility]

 

             Weight       2018 10:16:00                           [facility]

 [place]

 

             Heart Rate   2018 10:16:00                           [facility]

 [place]

 

             Temperature Oral (F) 2018 10:16:00 97.9 F                    

[facility] [place]

 

             Temperature Oral (F) 2018 13:40:00 97.2 F                    

[facility]

 

             Systolic (mm Hg) 2018 13:40:00                           [facility]

[facility] [place]

 

             Diastolic (mm Hg) 2018 13:40:00                           [facility]

[facility]

 

             Heart Rate   2018 13:40:00                           [facility]

 [place]

 

             Respitory Rate 2018 13:40:00                           [facility]

[facility] [place]

 

             Heart Rate   2018 05:24:00                           [facility]

 [place]

 

             Systolic (mm Hg) 2018 05:24:00                           [facility]

[facility] [place]

 

             Diastolic (mm Hg) 2018 05:24:00                           [facility]

[facility]

 

             Respitory Rate 2018 05:24:00                           [facility]

[facility] [place]

 

             Temperature Oral (F) 2018 05:24:00 98.1 F                    

[facility] [place]

 

             Respitory Rate 2018 01:15:00                           [facility]

[facility] [place]

 

             Systolic (mm Hg) 2018 01:15:00                           [facility]

[facility] [place]

 

             Diastolic (mm Hg) 2018 01:15:00                           [facility]

[facility] [place]

 

             Heart Rate   2018 01:15:00                           [facility]

 [place]

 

             Temperature Oral (F) 2018 01:15:00 98.1 F                    

[facility] [place]

 

             Weight       2018 14:00:00                           [facility]

 [place]

 

             Weight       2018 14:00:00                           [facility]

 [place]

 

             Weight       2018 14:00:00                           [facility]

 [place]

 

             BMI Calculated 2018 05:43:00                           [facility] [place]

 

             Height       2018 05:43:00 162.56 cm                 [facility]

 [place]

 

             Height       2018 22:41:00 149.86 cm                 [facility]

 [place]

 

             BMI Calculated 2018 22:41:00                           [facility]

al [place]







Procedures







                Procedure       Date / Time     Performing Clinician Source



                                Performed                       

 

                CT ABDOMEN PELVIS WO 2021-05-10 00:39:40 [facility]

[facility] [place]



                CONTRAST                                        [facility]

 

                LIPASE          2021-05-10 00:06:00 Acacia Villa [facility]

 

                COMP. METABOLIC PANEL 2021-05-10 00:06:00 [facility]



                ([id number])                                         [facility]

 

                CBC WITH DIFF   2021-05-10 00:06:00 Acacia Villa [facility]

 

                URINALYSIS      2021-05-10 00:00:00 Acacia Villa [facility]

 

                COVID-19 (ID NOW RAPID 2021-05-10 00:00:00 [facility]



                [facility]                                        [facility]

 

                Cholecystectomy                                 [facility]

 

                Shunt of cerebral                                 [facility]

nn



                ventricle to extracranial                                 



                site                                            







Plan of Care







             Planned Activity Planned Date Details      Comments     Source

 

             Future Scheduled 2023-01-10   Lipid panel               [facility]

s -



             Test         00:00:00     (procedure) [code =              Medical 

Center



                                       57758337]                 

 

             Future Scheduled 2021   INFLUENZA VACCINE              [facility]



             Test         00:00:00     (Season Ended) [code =              [facility]



                                       INFLUENZA VACCINE              



                                       (Season Ended)]              

 

             Future Scheduled 2021   DEPRESSION SCREENING              [facility]



             Test         00:00:00     (12+) [code =              Medical Center



                                       DEPRESSION SCREENING              



                                       (12+)]                    

 

             Future Scheduled 2020-04-10   Hemoglobin A1c              [facility]

[facility] -



             Test         00:00:00     [facility]



                                       (procedure) [code =              



                                       74216653]                 

 

             Future Scheduled 2004   DTAP/TDAP/TD VACCINES              [facility]



             Test         00:00:00     (1 - Tdap) [code =              Medical C

enter



                                       DTAP/TDAP/TD VACCINES              



                                       (1 - Tdap)]               

 

             Future Scheduled 2003   HEPATITIS C SCREENING              [facility]



             Test         00:00:00     [code = HEPATITIS C              Medical 

Center



                                       SCREENING]                

 

             Future Scheduled 1997   COVID-19 VACCINE (1)              [facility]



             Test         00:00:00     [code = COVID-19              [facility]

ter



                                       VACCINE (1)]              

 

             Future Scheduled 1995   DIABETIC EYE EXAM              [facility]



             Test         00:00:00     [code = DIABETIC EYE              Medical

 Center



                                       EXAM]                     

 

             Future Scheduled 1995   Urine screening for              [facility]



             Test         00:00:00     protein (procedure)              Medical 

Center



                                       [code = 014932806]              

 

             Future Scheduled 1991   PNEUMOCOCCAL VACCINE              [facility]



             Test         00:00:00     0-64 YRS (1 of 1 -              Medical C

enter



                                       PPSV23) [code =              



                                       PNEUMOCOCCAL VACCINE              



                                       0-64 YRS (1 of 1 -              



                                       PPSV23)]                  







Encounters







        Start   End     Encounter Admission Attending Care    Care    Encounter 

Source



        Date/Time Date/Time Type    Type    Clinicians Facility Department ID   

   

 

        2021-10-12 2021-10-12 Outpatient                 [facility]  [id number]

 [facility]



        [facility]



                                                                        [facility]



                                                                        Outpati



                                                                        ent



                                                                        Clinics

 

        2021 Outpatient                 [facility]  [id number]

 [facility]



        [facility]00                                                 [facility]



                                                                        [facility]



                                                                        l



                                                                        Outpati



                                                                        ent



                                                                        Clinics

 

        2021-08-10 2021-08-10 Outpatient                 [facility]  [id number]

 [facility]



        [facility]00                                                 [facility] 

[facility]



                                                                        [facility]



                                                                        l



                                                                        Outpati



                                                                        ent



                                                                        Clinics

 

        2021 Outpatient                 [facility]  [id number]

 [facility]



        00:[facility]00                                                 [facility] 

[facility]



                                                                        [facility]



                                                                        l



                                                                        Outpati



                                                                        ent



                                                                        Clinics

 

        2021 Outpatient                 [facility]  [id number]

 [facility]



        00:[facility]00                                                 [facility] 

[facility]



                                                                        [facility]



                                                                        l



                                                                        Outpati



                                                                        ent



                                                                        Clinics

 

        2021 Emergency         \[facility]\""    [id number]

[id number] 



        18:22:00 22:21:00                 Acacia Duron [id number]        

 



                                                [place] [id number]         



                                                [place]  [phone]         



                                                        084             

 

        2021 Emergency         [facility]    [id number]

[id number] [facility]



        18:22:00 22:21:00                 Acacia TWAN Duron [id number]        

 ity [facility] [id number]         [facility]  [phone]         [address]

 

        2021 [facility] X       [facility]     [id number]

[id number]



        18:22:00 18:22:00                 [facility]

 

        2019 Phone                   nullFlavo [facility]     [id number]

55 [facility]



        16:11:26 04:59:59 Message                 r       Neurosurger 08      l



                                                        y [facility]

 

        2019 Phone                   nullFlavo MNA     [id number]

55 [facility]



        16:16:47 04:59:59 Message                 r       Neurosurger 07      l



                                                        y [facility]

 

        2019-07-15 2019 Phone                   nullFlavo MNA     [id number]

55 [facility]



        15:52:03 04:59:59 Message                 r       Neurosurger 06      l



                                                        y [facility]

 

        2019 Phone                   nullFlavo MNA     [id number]

55 [facility]



        18:55:03 04:59:59 Message                 r       Neurosurger 05      l



                                                        y [facility]

 

        2018 Emergency                 nullFlavo [facility] [id number]

[id number] [facility]



        10:12:00 18:24:00                         r       [place] [address]      [facility]

 

        2018 Outpatient                 [facility]

[id number] [facility]



        11:15:00 11:15:00                         t Tagent

s [facility]



                                                Med[facility]



                                                                        [facility]



                                                                        Clinics

 

        2018 Outpatient                 [facility]

[id number] [facility]



        11:30:00 11:30:00                         [facility]



                                                                        [facility]



                                                                        [facility]

 

        2018 Outpatient                 [facility]

[id number] [facility]



        15:41:00 15:41:00                         [facility]



                                                                        ent



                                                                        [facility]

 

        2018 Outpatient                 [facility]

[id number] [facility]



        15:42:00 15:42:00                         [facility]



                                                                        ent



                                                                        [facility]

 

        2018 Outpatient                 [facility] [address]

[id number] [facility]



        11:00:00 11:00:00                         [facility]



                                                                        ent



                                                                        [facility]

 

        2018 Inpatient                 [facility] [id number]

[address] [facility]



        22:40:00 17:28:00                         [address]







Results







           Test Description Test Time  Test Comments Results    Result Comments 

Source









                    COVID-19 (ID NOW RAPID TESTING) 2021-05-10 01:01:33 









                      Test Item  Value      Reference Range Interpretation Comme

nts









             SARS-CoV-2 Rapid ID NOW (test code Not Detected Not Detected       

       



             = 76512-6)                                          

 

             MAL (test code = MAL) ID NOW COVID-19 Assay is an                  

         



                          isothermal nucleic acid                           



                          amplification test intended for                       

    



                          the qualitative detection of                          

 



                          nucleic acid from SARS-CoV-2 viral                    

       



                          RNA in nasopharyngeal (NP)                           



                          specimens. It is used under                           



                          Emergency Use Authorization (EUA)                     

      



                          by FDA. The limit of detection                        

   



                          (LOD) of the assay is 125 Genome                      

     



                          Equivalents/mL. A positive result                     

      



                          is indicative of the presence of                      

     



                          SARS-CoV-2 RNA. ?Clinical                           



                          correlation with patient history                      

     



                          and other diagnostic information                      

     



                          is necessary to determine patient                     

      



                          infection status. A negative (Not                     

      



                          Detected) result does not preclude                    

       



                          SARS-CoV-2 infection. In patients                     

      



                          with clinical symptoms and other                      

     



                          tests that are consistent with                        

   



                          SARS-CoV-2 infection, negative                        

   



                          results should be treated as                          

 



                          presumptive negative and a new                        

   



                          specimen should be tested with                        

   



                          alternative PCR molecular test.                       

    



                          Invalid: Please collect a new                         

  



                          specimen for repeat patient                           



                          testing if clinically indicated.                      

     

 

             Lab Interpretation (test code = Normal                             

    



             20617-1)                                            



[facility]. METABOLIC PANEL ([id number])2021-05-10 
01:00:33





             Test Item    Value        Reference Range Interpretation Comments

 

             NA (test code = 140 mmol/L   135-145                   



             [id number])                                         

 

             K (test code = 4.4 mmol/L   3.5-5.0                   



             4345685586)                                         

 

             CL (test code = 103 mmol/L                       



             8124512012)                                         

 

             CO2 TOTAL (test code = 28 mmol/L    23-31                     



             5940950895)                                         

 

             AGAP (test code =              2-16                      



             5155607311)                                         

 

             BUN (test code = 15 mg/dL     7-23                      



             0238079816)                                         

 

             GLUCOSE (test code = 85 mg/dL                         



             1885735008)                                         

 

             CREATININE (test code = 0.60 mg/dL   0.60-1.25                 



             2494758133)                                         

 

             TOTAL BILI (test code = 1.1 mg/dL    0.1-1.1                   



             6360650823)                                         

 

             CALCIUM (test code = 9.0 mg/dL    8.6-10.6                  



             1395484612)                                         

 

             T PROTEIN (test code = 7.8 g/dL     6.3-8.2                   



             1194796186)                                         

 

             ALBUMIN (test code = 4.0 g/dL     3.5-5.0                   



             0278875515)                                         

 

             ALK PHOS (test code = 166 U/L             H            



             9735019266)                                         

 

             ALTv (test code = 42 U/L       5-50                      



             1742-6)                                             

 

             AST(SGOT) (test code = 32 U/L       13-40                     



             5376308547)                                         

 

             eGFR (test code =              mL/min/1.73m2              



             8232965870)                                         

 

             MAL (test code = MAL) Association of                           



                          Glomerular Filtration                           



                          Rate (GFR) and Staging                           



                          of Kidney Disease*                           



                          +---------------------                           



                          --+-------------------                           



                          --+-------------------                           



                          ------+| GFR                           



                          (mL/min/1.73 m2) ?|                           



                          With Kidney Damage ?|                           



                          ?Without Kidney                           



                          Damage+---------------                           



                          --------+-------------                           



                          --------+-------------                           



                          ------------+| ?>90 ?                           



                          ? ? ? ? ? ? ? ?|                           



                          ?Stage one ? ? ? ? ?|                           



                          ? Normal ? ? ? ? ? ? ?                           



                          ?+--------------------                           



                          ---+------------------                           



                          ---+------------------                           



                          -------+| ?60-89 ? ? ?                           



                          ? ? ? ? ?| ?Stage two                           



                          ? ? ? ? ?| ? Decreased                           



                          GFR ? ? ? ?                            



                          +---------------------                           



                          --+-------------------                           



                          --+-------------------                           



                          ------+| ?30-59 ? ? ?                           



                          ? ? ? ? ?| ?Stage                           



                          three ? ? ? ?| ? Stage                           



                          three ? ? ? ? ?                           



                          +---------------------                           



                          --+-------------------                           



                          --+-------------------                           



                          ------+| ?15-29 ? ? ?                           



                          ? ? ? ? ?| ?Stage four                           



                          ? ? ? ? | ? Stage four                           



                          ? ? ? ? ?                              



                          ?+--------------------                           



                          ---+------------------                           



                          ---+------------------                           



                          -------+| ?<15 (or                           



                          dialysis) ? ?| ?Stage                           



                          five ? ? ? ? | ? Stage                           



                          five ? ? ? ? ?                           



                          ?+--------------------                           



                          ---+------------------                           



                          ---+------------------                           



                          -------+ *Each stage                           



                          assumes the associated                           



                          GFR level has been in                           



                          effect for at least                           



                          three months. ?Stages                           



                          1 to 5, with or                           



                          without kidney                           



                          disease, indicate                           



                          chronic kidney                           



                          disease. Notes:                           



                          Determination of                           



                          stages one and two                           



                          (with eGFR                             



                          >59mL/min/1.73 m2)                           



                          requires estimation of                           



                          kidney damage for at                           



                          least three months as                           



                          defined by structural                           



                          or functional                           



                          abnormalities of the                           



                          kidney, manifested by                           



                          either:Pathological                           



                          abnormalities or                           



                          Markers of kidney                           



                          damage (including                           



                          abnormalities in the                           



                          composition of the                           



                          blood or urine or                           



                          abnormalities in                           



                          imaging tests).                           

 

             Lab Interpretation Abnormal                               



             (test code = 15359-8)                                        



[facility]





             Test Item    Value        Reference Range Interpretation Comments

 

             LIPASE (test code = 2799367074) 57 U/L       0-220                 

    

 

             Lab Interpretation (test code = Normal                             

    



             66511-4)                                            



[facility]





             Test Item    Value        Reference Range Interpretation Comments

 

             APPEARANCE (test code = Turbid       Clear        A            



             6662797106)                                         

 

             COLOR (test code = Yellow       Yellow                    



             6599276293)                                         

 

             PH (test code =              4.8-8.0                   



             1429778063)                                         

 

             SP GRAVITY (test code =              1.003-1.030               



             9739261132)                                         

 

             GLU U QUAL (test code = Normal       Normal                    



             5720909176)                                         

 

             BLOOD (test code = 1+           Negative     A            



             2046564394)                                         

 

             KETONES (test code = 20 mg/dL     Negative     A            



             9530236479)                                         

 

             PROTEIN (test code = 100 mg/dL    Negative     A            



             2887-8)                                             

 

             UROBILIN (test code = 2.0 mg/dL    Normal       A            



             1506319777)                                         

 

             BILIRUBIN (test code = Negative     Negative                  



             8206719675)                                         

 

             NITRITE (test code = Positive     Negative     A            



             4459647440)                                         

 

             LEUK FRANCE (test code = 250/uL       Negative     A            



             4071083660)                                         

 

             RBC/HPF (test code =              See_Comment  H             [Autom

ated message]



             7679554276)                                         The system [employer]



                                                                 generated this



                                                                 result transmit

huma



                                                                 reference range

: 0 -



                                                                 3 HPF. The refe

rence



                                                                 range was not u

sed



                                                                 to interpret th

is



                                                                 result as



                                                                 normal/abnormal

.

 

             WBC/HPF (test code = >182         See_Comment  H             [Autom

ated message]



             5866198422)                                         The system whic

h



                                                                 generated this



                                                                 result transmit

huma



                                                                 reference range

: 0 -



                                                                 5 HPF. The refe

rence



                                                                 range was not u

sed



                                                                 to interpret th

is



                                                                 result as



                                                                 normal/abnormal

.

 

             BACTERIA (test code = Many         Negative     A            



             0834203837)                                         

 

             MUCOUS (test code = Moderate     Negative LPF A            



             8054703968)                                         

 

             WBC CLUMPS (test code =              See_Comment  H             [Au

tomated message]



             5411272110)                                         The system [employer]

[employer]



                                                                 generated this



                                                                 result transmit

huma



                                                                 reference range

: <=1



                                                                 HPF. The refere

nce



                                                                 range was not u

sed



                                                                 to interpret th

is



                                                                 result as



                                                                 normal/abnormal

.

 

             Lab Interpretation (test Abnormal                               



             code = 65100-8)                                        



[facility] WITH DIFF2021-05-10 00:46:14





             Test Item    Value        Reference Range Interpretation Comments

 

             WBC (test code =              See_Comment                [Automated



             9090-2)                                             message] The sy

stem



                                                                 which generated



                                                                 this result



                                                                 transmitted



                                                                 reference range

:



                                                                 4.20 - 10.70



                                                                 10*3/?L. The



                                                                 reference range

 was



                                                                 not used to



                                                                 interpret this



                                                                 result as



                                                                 normal/abnormal

.

 

             RBC (test code =              See_Comment                [Automated



             889-8)                                              message] The sy

stem



                                                                 which generated



                                                                 this result



                                                                 transmitted



                                                                 reference range

:



                                                                 4.26 - 5.52



                                                                 10*6/?L. The



                                                                 reference range

 was



                                                                 not used to



                                                                 interpret this



                                                                 result as



                                                                 normal/abnormal

.

 

             HGB (test code = 15.9 g/dL    12.2-16.4                 



             718-7)                                              

 

             HCT (test code = 47.1 %       38.4-49.3                 



             4544-3)                                             

 

             MCV (test code = 86.6 fL      81.7-95.6                 



             787-2)                                              

 

             MCH (test code = 29.2 pg      26.1-32.7                 



             785-6)                                              

 

             MCHC (test code = 33.8 g/dL    31.2-35.0                 



             786-4)                                              

 

             RDW-SD (test code = 47.6 fL      38.5-51.6                 



             60846-5)                                            

 

             RDW-CV (test code = 15.2 %       12.1-15.4                 



             788-0)                                              

 

             PLT (test code =              See_Comment  H             [Automated



             777-3)                                              message] The sy

stem



                                                                 which generated



                                                                 this result



                                                                 transmitted



                                                                 reference range

:



                                                                 150 - 328 10*3/

?L.



                                                                 The reference r

hzen



                                                                 was not used to



                                                                 interpret this



                                                                 result as



                                                                 normal/abnormal

.

 

             MPV (test code = 9.4 fL       9.8-13.0     L            



             16539-1)                                            

 

             NRBC/100 WBC (test              See_Comment                [Automat

ed



             code = 9518406351)                                        message] 

The system



                                                                 which generated



                                                                 this result



                                                                 transmitted



                                                                 reference range

:



                                                                 0.0 - 10.0 /100



                                                                 WBCs. The refer

ence



                                                                 range was not u

sed



                                                                 to interpret th

is



                                                                 result as



                                                                 normal/abnormal

.

 

             NRBC x10^3 (test code <0.01        See_Comment                [Auto

mated



             = 5997178979)                                        message] The s

ystem



                                                                 which generated



                                                                 this result



                                                                 transmitted



                                                                 reference range

:



                                                                 10*3/?L. The



                                                                 reference range

 was



                                                                 not used to



                                                                 interpret this



                                                                 result as



                                                                 normal/abnormal

.

 

             GRAN MAT (NEUT) % 61.3 %                                 



             (test code = 770-8)                                        

 

             IMM GRAN % (test code 0.70 %                                 



             = 0474291806)                                        

 

             LYMPH % (test code = 26.4 %                                 



             736-9)                                              

 

             MONO % (test code = 9.9 %                                  



             5905-5)                                             

 

             EOS % (test code = 1.3 %                                  



             713-8)                                              

 

             BASO % (test code = 0.4 %                                  



             706-2)                                              

 

             GRAN MAT x10^3(ANC) 6.39 10*3/uL 1.99-6.95                 



             (test code =                                        



             4974588525)                                         

 

             IMM GRAN x10^3 (test 0.07 10*3/uL 0.00-0.06    H            



             code = 4263198964)                                        

 

             LYMPH x10^3 (test code 2.75 10*3/uL 1.09-3.23                 



             = 731-0)                                            

 

             MONO x10^3 (test code 1.03 10*3/uL 0.36-1.02    H            



             = 742-7)                                            

 

             EOS x10^3 (test code = 0.14 10*3/uL 0.06-0.53                 



             711-2)                                              

 

             BASO x10^3 (test code 0.04 10*3/uL 0.01-0.09                 



             = 704-7)                                            

 

             Lab Interpretation Abnormal                               



             (test code = 78419-0)                                        



[id number]00





             Test Item    Value        Reference Range Interpretation Comments

 

             POC-GLUCOSE METER 140 mg/dL           H            : TESTED A

T [facility] [address]



             (BEAKER) (test code =                                        [place],



             [address]38)                                               [address]:



                                                                 /Techni

christina ID



                                                                 = [id number] for ANANT CATES



POCT-GLUCOSE [id number] 09:15:00





             Test Item    Value        Reference Range Interpretation Comments

 

             POC-GLUCOSE METER 142 mg/dL           H            : TESTED A

T [facility] [address]



             (BEAKER) (test code =                                        [facility],



             153)                                               [address]:



                                                                 /Techni

christina ID



                                                                 = [id number] for ANANT CATES



POCT-GLUCOSE METER2020-01-15 20:56:00





             Test Item    Value        Reference Range Interpretation Comments

 

             POC-GLUCOSE METER 134 mg/dL           H            : TESTED A

T [facility] [address]



             (BEAKER) (test code =                                        [facility],



             [address]38)                                               [address]:



                                                                 /Techni

christina ID



                                                                 = [id number] for SHERRILL GUARDADO



POCT-GLUCOSE METER2020-01-15 16:46:00





             Test Item    Value        Reference Range Interpretation Comments

 

             POC-GLUCOSE METER 91 mg/dL                         : TESTED A

T [facility] [address]



             (BEAKER) (test code =                                        [facility],



             [address]38)                                               [address]:



                                                                 /Techni

christina ID =



                                                                 [id number] for ANANT HAMILTON



POCT-GLUCOSE METER2020-01-15 12:19:00





             Test Item    Value        Reference Range Interpretation Comments

 

             POC-GLUCOSE METER 237 mg/dL           H            : TESTED A

T [facility] [address]



             (BEAKER) (test code =                                        [facility],



             [facility])                                               [address]:



                                                                 /Techni

christina ID



                                                                 = [id number] for ANANT CATES



MR, EXTREMITY, LOWER, WITHOUT CONTRAST, RIGHT2020-01-15 09:12:00FINAL REPORT 
PATIENT ID:   [id number] MRI of the right and left hindfoot without intravenous 
contrast History: Osteomyelitis, diabetic foot Comparison: Radiograph dated 
2020 Technique: Multiplanar multisequence MRI of the right and left 
hindfoot was performed without intravenous contrast using the hindfoot 
osteomyelitis protocol Findings: Right foot: Marked T1 and T2 hypointense soft 
tissue thickening is noted at the medial aspect of the right heel, likely to 
reflect scar tissue. There is also mild subcutaneous edema at the lateral aspect
of the mid foot and forefoot, incompletely imaged. No discrete drainable fluid 
collection is identified. There is no marrow signal abnormality to suggest 
osteomyelitis. There is a small nonspecific joint effusion at the ankle and 
subtalar joint. Scattered degenerative changes are noted. There is marked fatty 
atrophy of the distal leg and foot musculature. Severe flexor hallucis longus 
tendinopathy. No tenosynovitis. Left foot: There is a large area ofsoft tissue 
defect and granulation tissue at the posteromedial aspect of the heel, reaching 
the calcaneus, but there is no drainable fluid collection. Deformity is noted in
the hindfoot, and the forefoot appears adducted. No acute fracture. No marrow 
signal abnormality is identified to indicate acute osteomyelitis. There is no 
significant joint effusion. There is severe atrophy of the foot and distalleg 
musculature. No significant tenosynovitis identified. IMPRESSION: 
Granulation/scar tissue at themedial aspect of the heel bilaterally. No MR 
evidence of osteomyelitis. Severe muscle atrophy. Signed: Jorge Cornejoort 
Verified Date/Time:  01/15/2020 09:12:01 Reading Location: [facility] C013X Ortho 
Consult Reading Room        Electronically signed by: JORGE CORNEJO M.D. on 
01/15/2020 09:12 AMMR, EXTREMITY, LOWER, WITHOUT CONTRAST, LEFT2020-01-15 
09:12:00FINAL REPORT PATIENT ID:   [id number] MRI of the right and left hindfoot 
without intravenous contrast History: Osteomyelitis, diabetic foot Comparison: 
Radiograph dated 2020 Technique: Multiplanar multisequence MRI of the
right and left hindfoot was performed without intravenous contrast using the 
hindfoot osteomyelitis protocol Findings: Right foot: Marked T1 and T2 
hypointense soft tissue thickening is noted at the medial aspect of the right 
heel, likely to reflect scar tissue. There is also mild subcutaneous edema at 
the lateral aspect of the mid foot and forefoot, incompletely imaged. No 
discrete drainable fluid collection is identified. There is no marrow signal 
abnormality to suggest osteomyelitis. There is a small nonspecific joint 
effusion at the ankle and subtalar joint. Scattered degenerative changes are 
noted. There is marked fatty atrophy of the distal leg and foot musculature. 
Severe flexor hallucis longus tendinopathy. No tenosynovitis. Left foot: There 
is a large area ofsoft tissue defect and granulation tissue at the posteromedial
aspect of the heel, reaching the calcaneus, but there is no drainable fluid 
collection. Deformity is noted in the hindfoot, and the forefoot appears 
adducted. No acute fracture. No marrow signal abnormality is identified to 
indicate acute osteomyelitis. There is no significant joint effusion. There is 
severe atrophy of the foot and distalleg musculature. No significant 
tenosynovitis identified. IMPRESSION: Granulation/scar tissue at themedial 
aspect of the heel bilaterally. No MR evidence of osteomyelitis. Severe muscle 
atrophy. Signed: Jorge Cornejo Verified Date/Time:  01/15/2020 09:12:01 
Reading Location: [address] Ortho Consult Reading Room        Electronically 
signed by: JORGE CORNEJO M.D. on 01/15/2020 09:12 AMPOCT-GLUCOSE METER2020-01-15 
08:47:00





             Test Item    Value        Reference Range Interpretation Comments

 

             POC-GLUCOSE METER 264 mg/dL           H            : TESTED A

T [facility] [address]



             (BEAKER) (test code =                                        [facility],



             153)                                               [address]:



                                                                 /Techni

christina ID



                                                                 = [id number] for AMAYA ACOSTA



                                                                 ANANT



ISLET CELL AB SCR2020-01-15 07:43:00





             Test Item    Value        Reference Range Interpretation Comments

 

             ISLET CELL AB Refer to individual                           



             AUTOVERIFICATION (test Islet Cell Ab                           



             code = 2556) and/or Islet Cell                           



                          Ab Titer results.                           



POCT-GLUCOSE [id number] 21:27:00





             Test Item    Value        Reference Range Interpretation Comments

 

             POC-GLUCOSE METER 130 mg/dL           H            : TESTED A

T [facility] [address]



             (BEAKER) (test code =                                        [facility],



             [address]38)                                               [address]:



                                                                 /Techni

christina ID



                                                                 = [id number] for JESICA NEW



                                                                 KRANTHI



BLOOD [id number] 13:00:00





             Test Item    Value        Reference Range Interpretation Comments

 

             CULTURE (BEAKER) (test No growth in 5 days                         

  



             code = 1095)                                        



BLOOD [id number] 13:00:00





             Test Item    Value        Reference Range Interpretation Comments

 

             CULTURE (BEAKER) (test No growth in 5 days                         

  



             code = 1095)                                        



POCT-GLUCOSE [id number] 12:57:00





             Test Item    Value        Reference Range Interpretation Comments

 

             POC-GLUCOSE METER 138 mg/dL           H            : TESTED A

T [facility] [address]



             (BEAKER) (test code =                                        [facility],



             1538)                                               [id number]:



                                                                 /Techni

christina ID



                                                                 = [id number] for BARBARA HARKINS



POCT-GLUCOSE [id number] 08:43:00





             Test Item    Value        Reference Range Interpretation Comments

 

             POC-GLUCOSE METER 226 mg/dL           H            : TESTED A

T [facility]



             ([facility]) (test code =                                        [facility],



             153)                                               [address]:



                                                                 /Techni

christina ID



                                                                 = [id number] for [place]

LLNIURKA,



                                                                 BARBARA



POCT-GLUCOSE [id number] 21:11:00





             Test Item    Value        Reference Range Interpretation Comments

 

             POC-GLUCOSE METER 254 mg/dL           H            : Notified

 RN/MD:



             ([facility]) (test code =                                        TESTED

 AT [facility] [address]



             1538)                                               [facility],



                                                                 [id number]:



                                                                 /Techni

christina ID



                                                                 = [id number] for KRANTHI PIERRE



POCT-GLUCOSE [id number] 21:02:00





             Test Item    Value        Reference Range Interpretation Comments

 

             POC-GLUCOSE METER 177 mg/dL           H            : TESTED A

T [facility]



             ([facility]) (test code =                                        [facility],



             153)                                               [address]:



                                                                 /Techni

christina ID



                                                                 = [id number] for [place]

LLNIURKA,



                                                                 BARBARA



POCT-GLUCOSE [id number] 12:31:00





             Test Item    Value        Reference Range Interpretation Comments

 

             POC-GLUCOSE METER 215 mg/dL           H            : TESTED A

T [facility]



             ([facility]) (test code =                                        [facility],



             153)                                               [address]:



                                                                 /Techni

christina ID



                                                                 = [id number] for CAROL YANCEYIS,



                                                                 BARBARA



WOUND CULTURE + GRAM [id number] 10:10:00





             Test Item    Value        Reference    Interpretation Comments



                                       Range                     

 

             CULTURE ([facility]) PROTEUS MIRABILIS              A            1+ Pro

teus



             (test code = 1095)                                        mirabilis

 

             Amikacin (test code =                           S            



             1)                                                  

 

             Ampicillin +                           S            



             Sulbactam (test code                                        



             = 6)                                                

 

             Aztreonam (test code                           S            



             = 32)                                               

 

             Cefepime (test code =                           S            



             51)                                                 

 

             Cefoxitin (test code                           R            



             = 68)                                               

 

             Ceftazidime (test                           S            



             code = 27)                                          

 

             Ceftriaxone (test                           S            



             code = 52)                                          

 

             Ertapenem (test code                           S            



             = 38)                                               

 

             Gentamicin (test code                           S            



             = 18)                                               

 

             Levofloxacin (test                           R            



             code = 22)                                          

 

             Meropenem (test code                           S            



             = 34)                                               

 

             Piperacillin +                           S            



             Tazobactam (test code                                        



             = 29)                                               

 

             Tetracycline (test                           R            



             code = 2)                                           

 

             Tobramycin (test code                           S            



             = 25)                                               

 

             Trimethoprim +                           R            



             Sulfamethoxazole                                        



             (test code = 47)                                        

 

             CULTURE (BEAKER) METHICILLIN               A            1+ Methicil

bryn



             (test code = 1095) RESISTANT                              resistant



                          STAPHYLOCOCCUS                           Staphylococcu

s



                          AUREUS                                 aureus

 

             Clindamycin (test                           R            



             code = 10)                                          

 

             Erythromycin (test                           R            



             code = 4)                                           

 

             Linezolid (test code                           S            



             = 40)                                               

 

             Nitrofurantoin (test                           S            



             code = 23)                                          

 

             Oxacillin (test code                           R            



             = 14)                                               

 

             Rifampin (test code =                           S            



             43)                                                 

 

             Tetracycline (test                           S            



             code = 2)                                           

 

             Trimethoprim +                           S            



             Sulfamethoxazole                                        



             (test code = 47)                                        

 

             Vancomycin (test code                           S            



             = 13)                                               

 

             CULTURE (BEAKER) ESCHERICHIA COLI              A            <1+ Esc

herichia



             (test code = 1095)                                        coliESBL 

Positive

 

             Amikacin (test code =                           S            



             1)                                                  

 

             Ampicillin +                           R            



             Sulbactam (test code                                        



             = 6)                                                

 

             Aztreonam (test code                           R            



             = 32)                                               

 

             Cefepime (test code =                           R            



             51)                                                 

 

             Cefoxitin (test code                           R            



             = 68)                                               

 

             Ceftazidime (test                           R            



             code = 27)                                          

 

             Ceftriaxone (test                           R            



             code = 52)                                          

 

             Ertapenem (test code                           S            



             = 38)                                               

 

             Gentamicin (test code                           S            



             = 18)                                               

 

             Levofloxacin (test                           R            



             code = 22)                                          

 

             Meropenem (test code                           S            



             = 34)                                               

 

             Nitrofurantoin (test                           S            



             code = 23)                                          

 

             Piperacillin +                           R            



             Tazobactam (test code                                        



             = 29)                                               

 

             Tetracycline (test                           S            



             code = 2)                                           

 

             Tobramycin (test code                           S            



             = 25)                                               

 

             Trimethoprim +                           R            



             Sulfamethoxazole                                        



             (test code = 47)                                        

 

             CULTURE (BEAKER)                           A            Beta-hemoly

tic



             (test code = 1095)                                        streptoco

ccus



                                                                 group B, by



                                                                 serological



                                                                 grouping

 

             GRAM STAIN RESULT 3+ WBCs                                



             (BEAKER) (test code =                                        



             1123)                                               

 

             GRAM STAIN RESULT 1+ gram negative                           



             (BEAKER) (test code = rods                                   



             492178)                                             

 

             GRAM STAIN RESULT 2+ gram positive                           



             (BEAKER) (test code = rods                                   



             866114)                                             

 

             GRAM STAIN RESULT <1+ gram negative                           



             (BEAKER) (test code = coccobacilli                           



             476155)                                             

 

             GRAM STAIN RESULT 2+ gram positive                           



             (BEAKER) (test code = cocci in pairs                           



             627015)                                             



POCT-GLUCOSE [id number] 08:52:00





             Test Item    Value        Reference Range Interpretation Comments

 

             POC-GLUCOSE METER 209 mg/dL           H            : TESTED A

T [facility] [address]



             (BEAKER) (test code =                                        [facility],



             153)                                               [address]:



                                                                 /Techni

christina ID



                                                                 = [id number] for BARBARA HARKINS



POCT-GLUCOSE [id number] 21:26:00





             Test Item    Value        Reference Range Interpretation Comments

 

             POC-GLUCOSE METER 255 mg/dL           H            : TESTED A

T [facility] [address]



             (BEAKER) (test code =                                        [facility],



             1538)                                               [address]:



                                                                 /Techni

christina ID



                                                                 = [id number] for CR

ISWELL,



                                                                 TIA



POCT-GLUCOSE [id number] 17:34:00





             Test Item    Value        Reference Range Interpretation Comments

 

             POC-GLUCOSE METER 227 mg/dL           H            : TESTED A

T [facility] [address]



             (BEAKER) (test code =                                        [facility],



             153)                                               [address]:



                                                                 /Techni

christina ID



                                                                 = [id number] for WASSERMAN

NNY,



                                                                 NAKITA



POCT-GLUCOSE [id number] 11:28:00





             Test Item    Value        Reference Range Interpretation Comments

 

             POC-GLUCOSE METER 282 mg/dL           H            : TESTED A

T [facility] [address]



             (BEAKER) (test code =                                        [facility],



             153)                                               [address]:



                                                                 /Techni

christina ID



                                                                 = [id number] for WASSERMAN

NNY,



                                                                 NAKITA



POCT-GLUCOSE [id number] 08:19:00





             Test Item    Value        Reference Range Interpretation Comments

 

             POC-GLUCOSE METER 329 mg/dL           H            : TESTED A

T [facility] [address]



             (BEAKER) (test code =                                        [facility],



             153)                                               [address]:



                                                                 /Techni

christina ID



                                                                 = [id number] for ANANT CATES



POCT-GLUCOSE [id number] 21:32:00





             Test Item    Value        Reference Range Interpretation Comments

 

             POC-GLUCOSE METER 275 mg/dL           H            : TESTED A

T [facility] [address]



             (BEAKER) (test code =                                        [facility],



             153)                                               [address]:



                                                                 /Techni

christina ID



                                                                 = [id number] for CR

ISWELL,



                                                                 TIA



POCT-GLUCOSE [id number] 17:47:00





             Test Item    Value        Reference Range Interpretation Comments

 

             POC-GLUCOSE METER 304 mg/dL           H            : TESTED A

T [facility] [address]



             (BEAKER) (test code =                                        [facility],



             1538)                                               [address]:



                                                                 /Techni

christina ID



                                                                 = [id number] for LUIZA FIGUEROA



URINE [id number] 15:21:00





             Test Item    Value        Reference Range Interpretation Comments

 

             CULTURE (BEAKER)                           A            >100,000 co

l/mL



             (test code = 1095)                                        Beta-hemo

lytic



                                                                 streptococcus g

roup



                                                                 B, by serologic

al



                                                                 grouping

 

             CULTURE (BEAKER) PROTEUS                   A            80-89,000 c

ol/mL



             (test code = 1095) MIRABILIS                              Proteus



                                                                 mirabilisESBL



                                                                 Positive

 

             Amikacin (test code =                           S            



             1)                                                  

 

             Ampicillin +                           R            



             Sulbactam (test code                                        



             = 6)                                                

 

             Aztreonam (test code                           R            



             = 32)                                               

 

             Cefepime (test code =                           R            



             51)                                                 

 

             Cefoxitin (test code                           R            



             = 68)                                               

 

             Ceftazidime (test                           R            



             code = 27)                                          

 

             Ceftriaxone (test                           R            



             code = 52)                                          

 

             Ertapenem (test code                           S            



             = 38)                                               

 

             Gentamicin (test code                           R            



             = 18)                                               

 

             Levofloxacin (test                           R            



             code = 22)                                          

 

             Meropenem (test code                           S            



             = 34)                                               

 

             Nitrofurantoin (test                           R            



             code = 23)                                          

 

             Tetracycline (test                           R            



             code = 2)                                           

 

             Tobramycin (test code                           R            



             = 25)                                               



POCT-GLUCOSE [id number] 12:16:00





             Test Item    Value        Reference Range Interpretation Comments

 

             POC-GLUCOSE METER 337 mg/dL           H            : TESTED A

T [facility] [address]



             (BEAKER) (test code =                                        [facility]

[facility],



             1538)                                               [address]:



                                                                 /Techni

christina ID



                                                                 = [id number] for LUIZA FIGUEROA



BASIC METABOLIC [id number] 10:39:00





             Test Item    Value        Reference Range Interpretation Comments

 

             SODIUM (BEAKER) 134 meq/L    136-145      L            



             (test code = 381)                                        

 

             POTASSIUM (BEAKER) 4.6 meq/L    3.5-5.1                   



             (test code = 379)                                        

 

             CHLORIDE (BEAKER) 105 meq/L                        



             (test code = 382)                                        

 

             CO2 (BEAKER) (test 20 meq/L     22-29        L            



             code = 355)                                         

 

             BLOOD UREA NITROGEN 14 mg/dL     7-21                      



             (BEAKER) (test code                                        



             = 354)                                              

 

             CREATININE (BEAKER) 0.97 mg/dL   0.57-1.25                 



             (test code = 358)                                        

 

             GLUCOSE RANDOM 429 mg/dL           HH           



             (BEAKER) (test code                                        



             = 652)                                              

 

             CALCIUM (BEAKER) 8.9 mg/dL    8.4-10.2                  



             (test code = 697)                                        

 

             EGFR (BEAKER) (test 107 mL/min/1.73                           ESTIM

ATED GFR IS



             code = 1092) sq m                                   NOT AS ACCURATE

 AS



                                                                 CREATININE



                                                                 CLEARANCE IN



                                                                 PREDICTING



                                                                 GLOMERULAR



                                                                 FILTRATION RATE

.



                                                                 ESTIMATED GFR I

S



                                                                 NOT APPLICABLE 

FOR



                                                                 DIALYSIS PATIEN

TS.



 ID - MAGAN FPOCT-GLUCOSE [id number] 08:29:00





             Test Item    Value        Reference Range Interpretation Comments

 

             POC-GLUCOSE METER 395 mg/dL           H            : TESTED A

T [facility] [address]



             (BEAKER) (test code =                                        [facility] [place],



             1538)                                               [address]:



                                                                 /Techni

christina ID



                                                                 = [id number] for LUIZA FIGUEROA



CBC W/PLT COUNT &amp; AUTO [id number] 06:40:00





             Test Item    Value        Reference Range Interpretation Comments

 

             WHITE BLOOD CELL COUNT (BEAKER) 7.2 K/ L     3.5-10.5              

    



             (test code = 775)                                        

 

             RED BLOOD CELL COUNT (BEAKER) 5.48 M/ L    4.63-6.08               

  



             (test code = 761)                                        

 

             HEMOGLOBIN (BEAKER) (test code = 15.1 GM/DL   13.7-17.5            

     



             410)                                                

 

             HEMATOCRIT (BEAKER) (test code = 46.4 %       40.1-51.0            

     



             411)                                                

 

             MEAN CORPUSCULAR VOLUME (BEAKER) 84.7 fL      79.0-92.2            

     



             (test code = 753)                                        

 

             MEAN CORPUSCULAR HEMOGLOBIN 27.6 pg      25.7-32.2                 



             (BEAKER) (test code = 751)                                        

 

             MEAN CORPUSCULAR HEMOGLOBIN CONC 32.5 GM/DL   32.3-36.5            

     



             (BEAKER) (test code = 752)                                        

 

             RED CELL DISTRIBUTION WIDTH 15.5 %       11.6-14.4    H            



             (BEAKER) (test code = 412)                                        

 

             PLATELET COUNT (BEAKER) (test 315 K/CU MM  150-450                 

  



             code = 756)                                         

 

             MEAN PLATELET VOLUME (BEAKER) 10.5 fL      9.4-12.4                

  



             (test code = 754)                                        

 

             NUCLEATED RED BLOOD CELLS 0 /100 WBC   0-0                       



             (BEAKER) (test code = 413)                                        

 

             NEUTROPHILS RELATIVE PERCENT 62 %                                  

 



             (BEAKER) (test code = 429)                                        

 

             LYMPHOCYTES RELATIVE PERCENT 26 %                                  

 



             (BEAKER) (test code = 430)                                        

 

             MONOCYTES RELATIVE PERCENT 9 %                                    



             (BEAKER) (test code = 431)                                        

 

             EOSINOPHILS RELATIVE PERCENT 2 %                                   

 



             (BEAKER) (test code = 432)                                        

 

             BASOPHILS RELATIVE PERCENT 0 %                                    



             (BEAKER) (test code = 437)                                        

 

             NEUTROPHILS ABSOLUTE COUNT 4.48 K/ L    1.78-5.38                 



             (BEAKER) (test code = 670)                                        

 

             LYMPHOCYTES ABSOLUTE COUNT 1.87 K/ L    1.32-3.57                 



             (BEAKER) (test code = 414)                                        

 

             MONOCYTES ABSOLUTE COUNT (BEAKER) 0.62 K/ L    0.30-0.82           

      



             (test code = 415)                                        

 

             EOSINOPHILS ABSOLUTE COUNT 0.17 K/ L    0.04-0.54                 



             (BEAKER) (test code = 416)                                        

 

             BASOPHILS ABSOLUTE COUNT (BEAKER) 0.03 K/ L    0.01-0.08           

      



             (test code = 417)                                        

 

             IMMATURE GRANULOCYTES-RELATIVE 1 %          0-1                    

   



             PERCENT (BEAKER) (test code =                                      

  



             2801)                                               



POCT-GLUCOSE METER2020-01-10 19:55:00





             Test Item    Value        Reference Range Interpretation Comments

 

             POC-GLUCOSE METER 369 mg/dL           H            : TESTED A

T [facility] [address]



             (BEAKER) (test code =                                        [facility],



             153)                                               [id number]:



                                                                 /Techni

christina ID



                                                                 = [id number] for SHERRILL GUARDADO



POCT-GLUCOSE METER2020-01-10 16:45:00





             Test Item    Value        Reference Range Interpretation Comments

 

             POC-GLUCOSE METER 272 mg/dL           H            : TESTED A

T [facility] [address]



             (BEAKER) (test code =                                        [facility],



             1538)                                               [id number]:



                                                                 /Techni

christina ID



                                                                 = [id number] for SARAH VIDES



POCT-GLUCOSE METER2020-01-10 11:40:00





             Test Item    Value        Reference Range Interpretation Comments

 

             POC-GLUCOSE METER 277 mg/dL           H            : TESTED A

T [facility] [address]



             (BEAKER) (test code =                                        [facility],



             153)                                               [id number]:



                                                                 /Techni

christina ID



                                                                 = [id number] for YESENIA ANDERSON,



                                                                 SARAH



BASIC METABOLIC PANEL2020-01-10 10:22:00





             Test Item    Value        Reference Range Interpretation Comments

 

             SODIUM (BEAKER) 132 meq/L    136-145      L            



             (test code = 381)                                        

 

             POTASSIUM (BEAKER) 4.4 meq/L    3.5-5.1                   



             (test code = 379)                                        

 

             CHLORIDE (BEAKER) 103 meq/L                        



             (test code = 382)                                        

 

             CO2 (BEAKER) (test 21 meq/L     22-29        L            



             code = 355)                                         

 

             BLOOD UREA NITROGEN 14 mg/dL     7-21                      



             (BEAKER) (test code                                        



             = 354)                                              

 

             CREATININE (BEAKER) 0.89 mg/dL   0.57-1.25                 



             (test code = 358)                                        

 

             GLUCOSE RANDOM 428 mg/dL           HH           



             (BEAKER) (test code                                        



             = 652)                                              

 

             CALCIUM (BEAKER) 9.2 mg/dL    8.4-10.2                  



             (test code = 697)                                        

 

             EGFR (BEAKER) (test 119 mL/min/1.73                           ESTIM

ATED GFR IS



             code = 1092) sq m                                   NOT AS ACCURATE

 AS



                                                                 CREATININE



                                                                 CLEARANCE IN



                                                                 PREDICTING



                                                                 GLOMERULAR



                                                                 FILTRATION RATE

.



                                                                 ESTIMATED GFR I

S



                                                                 NOT APPLICABLE 

FOR



                                                                 DIALYSIS PATIEN

TS.



 ID - NTPLIPID PANEL2020-01-10 10:17:00





             Test Item    Value        Reference Range Interpretation Comments

 

             TRIGLYCERIDES (BEAKER) (test code = 692 mg/dL                      

        



             540)                                                

 

             CHOLESTEROL (BEAKER) (test code = 196 mg/dL                        

      



             631)                                                

 

             HDL CHOLESTEROL (BEAKER) (test code 24 mg/dL                       

        



             = 976)                                              



Calculated LDL not valid if triglyceride &gt;400 mg/dLTriglyceride Reference 
Range:   Low Risk  &lt;150   Borderline    150-199   High Risk     200-499   
Very High Risk  &gt;=500Cholesterol Reference Range:   Low Risk         &lt;200 
 Borderline    200-239    High Risk        &gt;240HDL Cholesterol Reference 
Range:   Low Risk         &gt;=60   High Risk         &lt;40LDL Cholesterol 
ReferenceRange:   Optimal          &lt;100   Near Optimal  100-129   Borderline 
  130-159   High          160-189   Very High       &gt;=190    ID - NTP
POCT-GLUCOSE METER2020-01-10 08:28:00





             Test Item    Value        Reference Range Interpretation Comments

 

             POC-GLUCOSE METER 388 mg/dL           H            : TESTED A

T [facility] [address]



             (BEAKER) (test code =                                        [place],



             1538)                                               [id number]:



                                                                 /Techni

christina ID



                                                                 = [id number] for ANANT CATES



HEMOGLOBIN A1C2020-01-10 07:54:00





             Test Item    Value        Reference Range Interpretation Comments

 

             HEMOGLOBIN A1C (BEAKER) (test code = 10.4 %       4.3-6.1      H   

         



             368)                                                



CBC W/PLT COUNT &amp; AUTO DIFFERENTIAL2020-01-10 05:56:00





             Test Item    Value        Reference Range Interpretation Comments

 

             WHITE BLOOD CELL COUNT (BEAKER) 6.5 K/ L     3.5-10.5              

    



             (test code = 775)                                        

 

             RED BLOOD CELL COUNT (BEAKER) 5.21 M/ L    4.63-6.08               

  



             (test code = 761)                                        

 

             HEMOGLOBIN (BEAKER) (test code = 14.6 GM/DL   13.7-17.5            

     



             410)                                                

 

             HEMATOCRIT (BEAKER) (test code = 44.3 %       40.1-51.0            

     



             411)                                                

 

             MEAN CORPUSCULAR VOLUME (BEAKER) 85.0 fL      79.0-92.2            

     



             (test code = 753)                                        

 

             MEAN CORPUSCULAR HEMOGLOBIN 28.0 pg      25.7-32.2                 



             (BEAKER) (test code = 751)                                        

 

             MEAN CORPUSCULAR HEMOGLOBIN CONC 33.0 GM/DL   32.3-36.5            

     



             (BEAKER) (test code = 752)                                        

 

             RED CELL DISTRIBUTION WIDTH 15.6 %       11.6-14.4    H            



             (BEAKER) (test code = 412)                                        

 

             PLATELET COUNT (BEAKER) (test 294 K/CU MM  150-450                 

  



             code = 756)                                         

 

             MEAN PLATELET VOLUME (BEAKER) 11.2 fL      9.4-12.4                

  



             (test code = 754)                                        

 

             NUCLEATED RED BLOOD CELLS 0 /100 WBC   0-0                       



             (BEAKER) (test code = 413)                                        

 

             NEUTROPHILS RELATIVE PERCENT 56 %                                  

 



             (BEAKER) (test code = 429)                                        

 

             LYMPHOCYTES RELATIVE PERCENT 31 %                                  

 



             (BEAKER) (test code = 430)                                        

 

             MONOCYTES RELATIVE PERCENT 10 %                                   



             (BEAKER) (test code = 431)                                        

 

             EOSINOPHILS RELATIVE PERCENT 2 %                                   

 



             (BEAKER) (test code = 432)                                        

 

             BASOPHILS RELATIVE PERCENT 1 %                                    



             (BEAKER) (test code = 437)                                        

 

             NEUTROPHILS ABSOLUTE COUNT 3.66 K/ L    1.78-5.38                 



             (BEAKER) (test code = 670)                                        

 

             LYMPHOCYTES ABSOLUTE COUNT 2.03 K/ L    1.32-3.57                 



             (BEAKER) (test code = 414)                                        

 

             MONOCYTES ABSOLUTE COUNT (BEAKER) 0.63 K/ L    0.30-0.82           

      



             (test code = 415)                                        

 

             EOSINOPHILS ABSOLUTE COUNT 0.15 K/ L    0.04-0.54                 



             (BEAKER) (test code = 416)                                        

 

             BASOPHILS ABSOLUTE COUNT (BEAKER) 0.03 K/ L    0.01-0.08           

      



             (test code = 417)                                        

 

             IMMATURE GRANULOCYTES-RELATIVE 1 %          0-1                    

   



             PERCENT ([facility]) (test code =                                      

  



             2801)                                               



POCT-GLUCOSE [id number] 23:47:00





             Test Item    Value        Reference Range Interpretation Comments

 

             POC-GLUCOSE METER 359 mg/dL           H            : Notified

 RN/MD:



             ([facility]) (test code =                                        TESTED

 AT [place] [address]



             153)                                               [facility],



                                                                 [address]:



                                                                 /Techni

christina ID



                                                                 = [id number] for



                                                                 LATGIBRANRIDROBERT TURNERN

ICE



POCT-GLUCOSE [id number] 21:13:00





             Test Item    Value        Reference Range Interpretation Comments

 

             POC-GLUCOSE METER 315 mg/dL           H            : TESTED A

T [facility]



             ([facility]) (test code =                                        [facility],



             153)                                               [id number]:



                                                                 /Techni

christina ID



                                                                 = [id number] for Sm

ith,



                                                                 Nicki



POCT-GLUCOSE [id number] 21:13:00





             Test Item    Value        Reference Range Interpretation Comments

 

             POC-GLUCOSE METER 331 mg/dL           H            : TESTED A

T [facility]



             ([facility]) (test code =                                        [facility],



             153)                                               [address]:



                                                                 /Techni

christina ID



                                                                 = [id number] for RO

SHIELA,



                                                                 DAVIDECA



POCT-GLUCOSE [id number] 10:30:00





             Test Item    Value        Reference Range Interpretation Comments

 

             POC-GLUCOSE METER 341 mg/dL           H            : TESTED A

T [facility] [address]



             ([facility]) (test code =                                        [facility],



             [facility])                                               [address]:



                                                                 /Techni

christina ID



                                                                 = [id number] for Sm

ith,



                                                                 Nicki



CBC W/PLT COUNT &amp; AUTO [id number] 08:48:00





             Test Item    Value        Reference Range Interpretation Comments

 

             WHITE BLOOD CELL COUNT ([facility]) 6.7 K/ L     3.5-10.5              

    



             (test code = 775)                                        

 

             RED BLOOD CELL COUNT ([facility]) 5.28 M/ L    4.63-6.08               

  



             (test code = 761)                                        

 

             HEMOGLOBIN ([facility]) (test code = 14.6 GM/DL   13.7-17.5            

     



             410)                                                

 

             HEMATOCRIT ([facility]) (test code = 44.9 %       40.1-51.0            

     



             411)                                                

 

             MEAN CORPUSCULAR VOLUME (BEAKER) 85.0 fL      79.0-92.2            

     



             (test code = 753)                                        

 

             MEAN CORPUSCULAR HEMOGLOBIN 27.7 pg      25.7-32.2                 



             (BEAKER) (test code = 751)                                        

 

             MEAN CORPUSCULAR HEMOGLOBIN CONC 32.5 GM/DL   32.3-36.5            

     



             (BEAKER) (test code = 752)                                        

 

             RED CELL DISTRIBUTION WIDTH 15.3 %       11.6-14.4    H            



             (BEAKER) (test code = 412)                                        

 

             PLATELET COUNT (BEAKER) (test 300 K/CU MM  150-450                 

  



             code = 756)                                         

 

             MEAN PLATELET VOLUME (BEAKER) 10.4 fL      9.4-12.4                

  



             (test code = 754)                                        

 

             NUCLEATED RED BLOOD CELLS 0 /100 WBC   0-0                       



             (BEAKER) (test code = 413)                                        



(MANUAL DIFFERENTIAL)2020 08:48:00





             Test Item    Value        Reference Range Interpretation Comments

 

             NEUTROPHILS - REL (DIFF) (BEAKER) 69 %                             

      



             (test code = 1359)                                        

 

             LYMPHOCYTES - REL (DIFF) (BEAKER) 25 %                             

      



             (test code = 1360)                                        

 

             MONOCYTES - REL (DIFF) (BEAKER) 3 %                                

    



             (test code = 1361)                                        

 

             EOSINOPHILS - REL (DIFF) (BEAKER) 3 %                              

      



             (test code = 1362)                                        

 

             BASOPHILS - REL (DIFF) (BEAKER) 0 %                                

    



             (test code = 1363)                                        

 

             NEUTROPHILS - ABS (DIFF) (BEAKER) 4.62 K/ L    1.80-8.00           

      



             (test code = 1365)                                        

 

             LYMPHOCYTES - ABS (DIFF) (BEAKER) 1.68 K/ L    1.48-4.50           

      



             (test code = 1366)                                        

 

             MONOCYTES - ABS (DIFF) (BEAKER) 0.20 K/ L    0.00-1.30             

    



             (test code = 1367)                                        

 

             EOSINOPHILS - ABS (DIFF) (BEAKER) 0.20 K/ L    0.00-0.50           

      



             (test code = 1368)                                        

 

             BASOPHILS - ABS (DIFF) (BEAKER) 0.00 K/ L    0.00-0.20             

    



             (test code = 1369)                                        

 

             TOTAL COUNTED (BEAKER) (test code = 100                            

        



             1351)                                               

 

             PLT MORPHOLOGY (BEAKER) (test code Normal                          

       



             = 486)                                              

 

             RBC MORPHOLOGY (BEAKER) (test code Normal                          

       



             = 762)                                              

 

             SMUDGE CELLS (BEAKER) (test code = Present                         

       



             1371)                                               



HEMOGLOBIN [id number] 06:39:00





             Test Item    Value        Reference Range Interpretation Comments

 

             HEMOGLOBIN A1C (BEAKER) (test code = 10.5 %       4.3-6.1      H   

         



             368)                                                



LIPID [id number] 04:57:00





             Test Item    Value        Reference Range Interpretation Comments

 

             TRIGLYCERIDES (BEAKER) 1251 mg/dL                             Speci

men moderately



             (test code = 540)                                        hemolyzed

 

             CHOLESTEROL (BEAKER) 215 mg/dL                              Specime

n moderately



             (test code = 631)                                        hemolyzed

 

             HDL CHOLESTEROL 22 mg/dL                               



             (BEAKER) (test code =                                        



             976)                                                



Calculated LDL not valid if triglyceride &gt;400 mg/dLTriglyceride Reference 
Range:   Low Risk  &lt;150   Borderline    150-199   High Risk     200-499   
Very High Risk  &gt;=500Cholesterol Reference Range:   Low Risk         &lt;200 
 Borderline    200-239    High Risk        &gt;240HDL Cholesterol Reference 
Range:   Low Risk         &gt;=60   High Risk         &lt;40LDL Cholesterol 
ReferenceRange:   Optimal          &lt;100   Near Optimal  100-129   Borderline 
  130-159   High          160-189   Very High       &gt;=190   Specimen 
moderately lipemicBASIC METABOLIC [id number] 04:56:00





             Test Item    Value        Reference Range Interpretation Comments

 

             SODIUM (BEAKER) 137 meq/L    136-145                   



             (test code = 381)                                        

 

             POTASSIUM (BEAKER) 4.6 meq/L    3.5-5.1                   Specimen 

moderately



             (test code = 379)                                        hemolyzed

 

             CHLORIDE (BEAKER) 106 meq/L                        



             (test code = 382)                                        

 

             CO2 (BEAKER) (test 20 meq/L     22-29        L            



             code = 355)                                         

 

             BLOOD UREA NITROGEN 12 mg/dL     7-21                      



             (BEAKER) (test code                                        



             = 354)                                              

 

             CREATININE (BEAKER) 0.95 mg/dL   0.57-1.25                 Specimen

 moderately



             (test code = 358)                                        hemolyzed

 

             GLUCOSE RANDOM 398 mg/dL           H            



             (BEAKER) (test code                                        



             = 652)                                              

 

             CALCIUM (BEAKER) 8.8 mg/dL    8.4-10.2                  



             (test code = 697)                                        

 

             EGFR (BEAKER) (test 110 mL/min/1.73                           ESTIM

ATED GFR IS



             code = 1092) sq m                                   NOT AS ACCURATE

 AS



                                                                 CREATININE



                                                                 CLEARANCE IN



                                                                 PREDICTING



                                                                 GLOMERULAR



                                                                 FILTRATION RATE

.



                                                                 ESTIMATED GFR I

S



                                                                 NOT APPLICABLE 

FOR



                                                                 DIALYSIS PATIEN

TS.



POCT-GLUCOSE [id number] 21:53:00





             Test Item    Value        Reference Range Interpretation Comments

 

             POC-GLUCOSE METER > mg/dL             HH           : Notified

 RN/MD: TESTED



             (BEAKER) (test code =                                        AT [facility] [address]



             1538)                                               [facility], [address]

30:



                                                                 /Techni

christina ID =



                                                                 [id number] for RODRIGO

IS, ZECHARIAH



U/S, ABDOMINAL, [id number] 19:54:00Reason for exam:-&gt;Evaluation of  
shunt and for possible cyst or pseudocyst Should this be performed at the 
bedside?-&gt;YesFINAL REPORT PATIENT ID:   [id number] Limited abdominal 
ultrasound. CLINICAL HISTORY: Evaluation of VPshunt for possible cyst or 
pseudocyst. COMPARISON STUDY: None available. FINDINGS: Sonographic assessment 
of the abdomen was performed assessing for fluid in the region of the patient's 
shunts. No fluid collections are seen. However, the study is limited by the 
patient's body habitus. CT scan would bemore sensitive. Signed: Madison Hendricksonort Verified Date/Time:  2020 19:54:10 Reading Location: [address]
Consult Reading Room      Electronically signed by: MADISON HENDRICKSON M.D. on 
2020 07:54 PMPOCT-GLUCOSE [id number] 19:34:00





             Test Item    Value        Reference Range Interpretation Comments

 

             POC-GLUCOSE METER 474 mg/dL           HH           : Notified

 RN/MD:



             (BEAKER) (test code =                                        TESTED

 AT [place] [address]



             1538)                                               [facility],



                                                                 [id number]:



                                                                 /Techni

christina ID



                                                                 = [id number] for LONA URIOSTEGUI



URINALYSIS W/ REFLEX URINE [id number] 17:48:00





             Test Item    Value        Reference Range Interpretation Comments

 

             COLOR (BEAKER) (test code = 470) Yellow                            

     

 

             CLARITY (BEAKER) (test code = Hazy                                 

  



             469)                                                

 

             SPECIFIC GRAVITY UA (BEAKER) 1.020        1.001-1.035              

 



             (test code = 468)                                        

 

             PH UA (BEAKER) (test code = 467) 6.0          5.0-8.0              

     

 

             PROTEIN UA (BEAKER) (test code = 20 mg/dL     Negative     A       

     



             464)                                                

 

             GLUCOSE UA (BEAKER) (test code = >1000 mg/dL  Negative     A       

     



             365)                                                

 

             KETONES UA (BEAKER) (test code = 40 mg/dL     Negative     A       

     



             371)                                                

 

             BILIRUBIN UA (BEAKER) (test code Negative     Negative             

     



             = 462)                                              

 

             BLOOD UA (BEAKER) (test code = Moderate     Negative     A         

   



             461)                                                

 

             NITRITE UA (BEAKER) (test code = Positive     Negative     A       

     



             465)                                                

 

             LEUKOCYTE ESTERASE UA (BEAKER) Large        Negative     A         

   



             (test code = 466)                                        

 

             UROBILINOGEN UA (BEAKER) (test 0.2 mg/dL    0.2-1.0                

   



             code = 463)                                         

 

             RBC UA (BEAKER) (test code = 519) 0 /HPF                           

      

 

             WBC UA (BEAKER) (test code = 520) 267 /HPF                         

      

 

             BACTERIA (BEAKER) (test code = Moderate                            

   



             517)                                                

 

             SOURCE(BEAKER) (test code = 7441)                                  

      



CBC W/PLT COUNT &amp; AUTO [id number] 17:38:00





             Test Item    Value        Reference Range Interpretation Comments

 

             WHITE BLOOD CELL COUNT (BEAKER) 7.8 K/ L     3.5-10.5              

    



             (test code = 775)                                        

 

             RED BLOOD CELL COUNT (BEAKER) 5.12 M/ L    4.63-6.08               

  



             (test code = 761)                                        

 

             HEMOGLOBIN (BEAKER) (test code = 14.7 GM/DL   13.7-17.5            

     



             410)                                                

 

             HEMATOCRIT (BEAKER) (test code = 43.3 %       40.1-51.0            

     



             411)                                                

 

             MEAN CORPUSCULAR VOLUME (BEAKER) 84.6 fL      79.0-92.2            

     



             (test code = 753)                                        

 

             MEAN CORPUSCULAR HEMOGLOBIN 28.7 pg      25.7-32.2                 



             (BEAKER) (test code = 751)                                        

 

             MEAN CORPUSCULAR HEMOGLOBIN CONC 33.9 GM/DL   32.3-36.5            

     



             (BEAKER) (test code = 752)                                        

 

             RED CELL DISTRIBUTION WIDTH 15.3 %       11.6-14.4    H            



             (BEAKER) (test code = 412)                                        

 

             PLATELET COUNT (BEAKER) (test 344 K/CU MM  150-450                 

  



             code = 756)                                         

 

             MEAN PLATELET VOLUME (BEAKER) 11.0 fL      9.4-12.4                

  



             (test code = 754)                                        

 

             NUCLEATED RED BLOOD CELLS 0 /100 WBC   0-0                       



             (BEAKER) (test code = 413)                                        



(CELLAVISION MANUAL DIFF)2020 17:38:00





             Test Item    Value        Reference Range Interpretation Comments

 

             NEUTROPHILS - REL 63 %                                   



             (CELLAVISION)(BEAKER) (test code                                   

     



             = 2816)                                             

 

             LYMPHOCYTES - REL 23 %                                   



             (CELLAVISION)(BEAKER) (test code                                   

     



             = 2817)                                             

 

             MONOCYTES - REL 6 %                                    



             (CELLAVISION)(BEAKER) (test code                                   

     



             = 2818)                                             

 

             EOSINOPHILS - REL 5 %                                    



             (CELLAVISION)(BEAKER) (test code                                   

     



             = 2819)                                             

 

             BASOPHILS - REL 1 %                                    



             (CELLAVISION)(BEAKER) (test code                                   

     



             = 2820)                                             

 

             BANDS - REL (CELLAVISION)(BEAKER) 2 %          0-10                

      



             (test code = 2826)                                        

 

             NEUTROPHILS - ABS 4.91 K/ul    1.78-5.38                 



             (CELLAVISION)(BEAKER) (test code                                   

     



             = 2830)                                             

 

             LYMPHOCYTES - ABS 1.79 K/ul    1.32-3.57                 



             (CELLAVISION)(BEAKER) (test code                                   

     



             = 2831)                                             

 

             MONOCYTES - ABS 0.47 K/uL    0.30-0.82                 



             (CELLAVISION)(BEAKER) (test code                                   

     



             = 2832)                                             

 

             EOSINOPHILS - ABS 0.39 K/uL    0.04-0.54                 



             (CELLAVISION)(BEAKER) (test code                                   

     



             = 2834)                                             

 

             BASOPHILS - ABS 0.08 K/uL    0.01-0.08                 



             (CELLAVISION)(BEAKER) (test code                                   

     



             = 2835)                                             

 

             BANDS - ABS (CELLAVISION)(BEAKER) 0.16 K/uL    0.00-0.80           

      



             (test code = 2840)                                        

 

             TOTAL COUNTED (BEAKER) (test code 100                              

      



             = 1351)                                             

 

             SMUDGE CELLS (BEAKER) (test code Present                           

     



             = 1371)                                             

 

             GIANT PLATELETS (BEAKER) (test Present                             

   



             code = 313)                                         

 

             ANISOCYTOSIS (BEAKER) (test code 1+ few                            

     



             = 961)                                              

 

             POIKILOCYTES (BEAKER) (test code 2+ moderate                       

     



             = 966)                                              

 

             SPHEROCYTES (BEAKER) (test code = 1+ few                           

      



             768)                                                

 

             OVALOCYTES (BEAKER) (test code = 1+ few                            

     



             477)                                                

 

             TEAR DROP CELLS (BEAKER) (test 1+ few                              

   



             code = 481)                                         

 

             ARTIFACT (CELLAVISION)(BEAKER) Present                             

   



             (test code = 3432)                                        

 

             PLATELET CONCENTRATION Adequate                               



             (CELLAVISION)(BEAKER) (test code                                   

     



             = 3438)                                             



Received comment: User comments: Slide comments:POCT-GLUCOSE [id number] 
16:27:00





             Test Item    Value        Reference Range Interpretation Comments

 

             POC-GLUCOSE METER 424 mg/dL           HH           : Notified

 RN/MD:



             (KUN) (test code =                                        TESTED

 AT [facility] [address]



             1538)                                               [place] [facility],



                                                                 [id number]:



                                                                 /Techni

christina ID



                                                                 = [id number] for AK

LONA DUNNE



CT, BRAIN, WITHOUT [id number] 15:31:00FINAL REPORT PATIENT ID:   
[id number] CT, BRAIN, WITHOUT CONTRAST CLINICAL INDICATION:  Hydrocephalus C
OMPARISON: None TECHNIQUE:  Noncontrast axial CT imaging of the brain and skull.
 DOSE REDUCTION: Dose modulation, iterative reconstruction, and/or weight-based 
adjustment of the mA/kV was utilized to reduce the radiation dose to as low as 
reasonably achievable. FINDINGS:A total of two ventricular drainage catheters 
are present. A right transverse temporal catheter terminates across the midline 
just above the level of the foramen of Monro. A right parietal approach catheter
terminates in the pineal region. Supratentorial ventricular configuration is 
slitlike. There is no herniation. The basilar cisterns are preserved. There is 
no identifiable recent infarct. 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 Calvarial configuration escape of 
cephalic. Thereis no acute osseous abnormality.  IMPRESSION: Chronic, likely 
congenital parenchymal changes withoutrecent infarct or hemorrhage. A total of 
two ventricular drainage catheters are present. Supratentorial ventricular 
configuration is slitlike and may represent over shunting. Correlation 
recommended. Signed: JR Rae Robert MDReport Verified Date/Time:  
2020 15:31:08 Reading Location: [address] Neuro Reading Room    
Electronically signed by: ALONDRA RAE on 2020 03:31 PMRAD, 
SHUNT [id number] 15:13:00Reason for exam:-&gt;concern for VPS malfunction
FINAL REPORT PATIENT ID:   [id number] RAD, SHUNT SERIES INDICATION: concern for 
VPS malfunction COMPARISON: None TECHNIQUE: AP and lateral radiographs of the 
skull, abdomen and chest were acquired to evaluate shunt catheter FINDINGS:An 
abandoned shunt catheter is present within the right neck. Medial tothis a 
second shunt catheter is present which descends along the left chest wall, 
terminating withinthe mid pelvis. Radiopaque portions of the catheter appear 
contiguous. Signed: Lisa Taneport Verified Date/Time:  2020 
15:13:54 Reading Location:  [facility] Reading Room  Electronically 
signed by: LISA TAN MD on 2020 03:13 PMPOCT-GLUCOSE METER
2020 11:38:00





             Test Item    Value        Reference Range Interpretation Comments

 

             POC-GLUCOSE METER 394 mg/dL           H            : TESTED A

T [facility] [address]



             (BEBanner) (test code =                                        MILY NELSON [facility],



             1538)                                               [id number]:



                                                                 /Techni

christina ID



                                                                 = [id number] for LONA URIOSTEGUI



HEMOGLOBIN [id number] 09:15:00





             Test Item    Value        Reference Range Interpretation Comments

 

             HEMOGLOBIN A1C (BEAKER) (test code = 10.4 %       4.3-6.1      H   

         



             368)                                                



TSH/FREE T4 IF [id number] 07:54:00





             Test Item    Value        Reference Range Interpretation Comments

 

             THYROID STIMULATING HORMONE 1.40 uIU/mL  0.35-4.94                 



             (BEAKER) (test code = 772)                                        



POCT-GLUCOSE [id number] 07:48:00





             Test Item    Value        Reference Range Interpretation Comments

 

             POC-GLUCOSE METER > mg/dL             HH           : Notified

 RN/MD: TESTED



             (BEAKER) (test code =                                        AT [facility] [address]



             [address])                                               [facility], [address]

[address]:



                                                                 /Techni

christina ID =



                                                                 [id number] for LONA GOLDSMITH



BASIC METABOLIC [id number] 07:44:00





             Test Item    Value        Reference Range Interpretation Comments

 

             SODIUM (BEAKER) 134 meq/L    136-145      L            



             (test code = 381)                                        

 

             POTASSIUM (BEAKER) 4.4 meq/L    3.5-5.1                   



             (test code = 379)                                        

 

             CHLORIDE (BEAKER) 103 meq/L                        



             (test code = 382)                                        

 

             CO2 (BEAKER) (test 20 meq/L     22-29        L            



             code = 355)                                         

 

             BLOOD UREA NITROGEN 17 mg/dL     7-21                      



             (BEAKER) (test code                                        



             = 354)                                              

 

             CREATININE (BEAKER) 1.09 mg/dL   0.57-1.25                 



             (test code = 358)                                        

 

             GLUCOSE RANDOM 464 mg/dL           HH           



             (BEAKER) (test code                                        



             = 652)                                              

 

             CALCIUM (BEAKER) 8.6 mg/dL    8.4-10.2                  



             (test code = 697)                                        

 

             EGFR (BEAKER) (test 94 mL/min/1.73                           ESTIMA

HUMA GFR IS



             code = 1092) sq m                                   NOT AS ACCURATE

 AS



                                                                 CREATININE



                                                                 CLEARANCE IN



                                                                 PREDICTING



                                                                 GLOMERULAR



                                                                 FILTRATION RATE

.



                                                                 ESTIMATED GFR I

S



                                                                 NOT APPLICABLE 

FOR



                                                                 DIALYSIS PATIEN

TS.



LIPID [id number] 07:34:00





             Test Item    Value        Reference Range Interpretation Comments

 

             TRIGLYCERIDES (BEAKER) (test code = 750 mg/dL                      

        



             540)                                                

 

             CHOLESTEROL (BEAKER) (test code = 196 mg/dL                        

      



             631)                                                

 

             HDL CHOLESTEROL (BEAKER) (test code 22 mg/dL                       

        



             = 976)                                              



Calculated LDL not valid if triglyceride &gt;400 mg/dLTriglyceride Reference 
Range:   Low Risk  &lt;150   Borderline    150-199   High Risk     200-499   
Very High Risk  &gt;=500Cholesterol Reference Range:   Low Risk         &lt;200 
 Borderline    200-239    High Risk        &gt;240HDL Cholesterol Reference 
Range:   Low Risk         &gt;=60   High Risk         &lt;40LDL Cholesterol 
ReferenceRange:   Optimal          &lt;100   Near Optimal  100-129   Borderline 
  130-159   High          160-189   Very High       &gt;=190POCT-GLUCOSE METER
2020 00:49:00





             Test Item    Value        Reference Range Interpretation Comments

 

             POC-GLUCOSE METER 399 mg/dL           H            : TESTED A

T [facility] [address]



             (BETAL) (test code =                                        MILY NELSON [facility],



             1538)                                               [id number]:



                                                                 /Techni

christina ID



                                                                 = [id number] for CLAY BATRES



RAD, FOOT, 2 VIEWS, [id number] 22:28:00Reason for exam:-&gt;osteomyletitis
FINAL REPORT PATIENT ID:   [id number] TECHNIQUE: Two views each of the bilateral 
feet HISTORY: osteomyletitis. COMPARISON: None. IMPRESSION:No acute displaced 
fracture or dislocation. Joint spaces are within normal limits.Severe soft 
tissue swelling.On the right subcentimeter nonspecific calcifications adjacent 
to the heel plantar aspect. Correlate with physical exam. Severely limited exam 
due to patient body habitus. No definite cortical irregularity.There is clinical
concern for osteomyelitis, recommend MRI with contrast. Signed: Sriram Townsendeport Verified Date/Time:  2020 22:28:25 Reading Location: [facility] C013W
Consult Reading Room  Electronically signed by: SRIRAM TOWNSEND DO on 
2020 10:28 PMRAD, FOOT, 2 VIEWS, [id number] 22:28:00Reason for 
exam:-&gt;osteomyletitsFINAL REPORT PATIENT ID:   [id number] TECHNIQUE: Two views 
each of the bilateral feet HISTORY: osteomyletitis. COMPARISON: None. 
IMPRESSION:No acute displaced fracture or dislocation. Joint spaces are within 
normal limits.Severe soft tissue swelling.On the right subcentimeter nonspecific
calcifications adjacent to the heel plantar aspect. Correlate with physical 
exam. Severely limited exam due to patient body habitus. No definite cortical 
irregularity.There is clinical concern for osteomyelitis, recommend MRI with 
contrast. Signed: Sriram Townsend MDReport Verified Date/Time:  2020 
22:28:25 Reading Location: [facility] C013W Consult Reading Room  Electronically 
signed by: SRIRAM TOWNSEND DO on 2020 10:28 PMPOCT-GLUCOSE METER
2020 21:04:00





             Test Item    Value        Reference Range Interpretation Comments

 

             POC-GLUCOSE METER 430 mg/dL           HH           : Notified

 RN/MD:



             (KUN) (test code =                                        TESTED

 AT [facility] [address]



             1538)                                               [facility],



                                                                 [address]:



                                                                 /Techni

christina ID



                                                                 = [id number] for SO

DIPEJORGEE



URINE AND [id number] 16:48:00Negative *NA*(18 10:48 AM)[facility] [id number] 16:48:00Negative *NA*(18 10:48 AM)[facility] [id number] 16:48:00Trace *ABN*(18 10:48 AM)[facility] [id number] 16:48:000.2[facility] STOOL
2018 16:48:00Large *ABN*(18 10:48 AM)[facility] STOOL
2018 16:48:00Negative (18 10:48 AM)[facility] STOOL
2018 16:48:00Negative (18 10:48 AM)[facility] STOOL
2018 16:48:00





             Test Item    Value        Reference Range Interpretation Comments

 

             UA pH (test code = UA pH) 7.0 1        5.0-8.0                   



[facility] [id number] 16:48:00





             Test Item    Value        Reference Range Interpretation Comments

 

             UA Spec Grav (test code = UA Spec 1.015 1                          

      



             Grav)                                               



[facility] [id number] 16:48:00Yellow *NA*(18 10:48 AM)
[facility] [id number] 16:48:00Clear (18 10:48 AM)
[facility] [id number] 16:48:00None Seen (18 10:48 AM)
[facility] HermannERTAPENEM:SUSC:PT:ISOLATE:ORDQN:[id number] 16:48:00Proteus 
mirabilisMemorial HermannURINE AND [id number] 16:48:00Negative (18 
10:48 AM)[facility]annBLOOD BANK [id number] 11:57:00Negative (18 
5:57 AM)[facility] [id number] 11:57:00





             Test Item    Value        Reference Range Interpretation Comments

 

             PTT (test code = PTT) 33.4 s       22.9-35.8                 



[facility] [id number] 11:57:00





             Test Item    Value        Reference Range Interpretation Comments

 

             PT (test code = PT) 13.7 s       12.0-14.7                 



[facility] [id number] 11:57:00





             Test Item    Value        Reference Range Interpretation Comments

 

             INR (test code = INR) 1.05 1       0.85-1.17                 



[facility] HermannCHEM [id number] 10:50:48897Dtaflmyj HermannCHEM PANEL
2018 10:50:019.4[facility] HermannCHEM [id number] 10:50:0128[facility] 
HermannCHEM [id number] 10:50:0114[facility] HermannCHEM [id number] 
10:50:0189[facility] HermannCHEM [id number] 10:50:07696Zsyghyqj HermannCHEM 
[id number] 10:50:014.2[facility] HermannCHEM [id number] 10:50:08480
[facility] Alice[facility]CHE[facility] [id number] 10:50:010.85[facility] HermannCHEM PANEL
2018 10:50:019.2[facility] [id number] 10:50:01





             Test Item    Value        Reference Range Interpretation Comments

 

             ACT (TEG) Rapid (test code = ACT (TEG) 136 s                 

           



             Rapid)                                              



[facility] CentennialGikkfftMBLICOFMLK3758-96-47 10:50:01





             Test Item    Value        Reference Range Interpretation Comments

 

             Split Point Rapid (test code = Split 0.6 min                       

         



             Point Rapid)                                        



[facility] CentennialGkqbdppQQWVHVJGNT8256-36-40 10:50:01





             Test Item    Value        Reference Range Interpretation Comments

 

             R-time Rapid (test code = R-time 0.9 min      0.4-0.7              

     



             Rapid)                                              



[facility] CentennialBosbnblCCJSFLHFJA8595-11-72 10:50:01





             Test Item    Value        Reference Range Interpretation Comments

 

             K-time Rapid (test code = K-time 1.4 min      0.6-2.3              

     



             Rapid)                                              



[facility] CentennialKktrjhlSCYUHQNUMK6239-36-29 10:50:01





             Test Item    Value        Reference Range Interpretation Comments

 

             Angle Rapid (test code = Angle 71 degrees   64-80                  

   



             Rapid)                                              



[facility] CentennialTqwgrgbLOPCINGGXT1496-76-13 10:50:0112.7[facility]HE[facility]
2018 10:50:01





             Test Item    Value        Reference Range Interpretation Comments

 

             Max Amplitude Rapid (test code = Max 72 mm        52-71            

         



             Amplitude Rapid)                                        



[facility] CentennialTdtmvzjAAINMXDMUW1584-44-54 10:50:010.1MM[facility] HospitalHEMATOLOGY
2018 10:50:89787KgcwghzvHarris [facility] Johnson HospitalJxxdxrnBNEBPAZMWB4108-65-22 10:50:017.8[facility] HospitalZbxtgqzLWREHNNFSW5442-63-83 10:50:01





             Test Item    Value        Reference Range Interpretation Comments

 

             MCH (test code = MCH) 27.4 pg      27.0-31.0                 



[facility] CentennialKrykqacDFQRCADYYV8775-61-45 10:50:0180.7[facility]annHEMATOLOGY
2018 10:50:0134.0[facility] BfqmuisGUBTNFIXVP6413-55-53 10:50:0118.9[facility]
PiypohiWBUJIOLQGH7189-52-30 10:50:0143.3[facility] KwholboPSLXTACPBV9887-10-69 
10:50:019.3[facility] BnqdygvSSPVDJJXHG0294-80-00 10:50:0114.7[facility]
TLKVBHGUJJ8918-61-17 10:50:015.36[facility] TtnvimiXOQHCKLLKR2388-31-21 10:50:01
0.2[facility] QypxvepMEYSZXMQTO8907-94-64 10:50:010.1[facility]annHEMATOLOGY
2018 10:50:011.8[facility] IjxlvphWGYYAXZNKN8797-30-16 10:50:010.9[facility] 
RcypgtjHVGPELVLRZ4489-08-01 10:50:016.3[facility] NjwwpbqGQLBPOFNKF5952-06-93 
10:50:012.0[facility] GnuyjtvCTAWGXMIOH1973-57-46 10:50:0167.4[facility]
KSYIFLEXPG4203-61-02 10:50:0119.9[facility] OealzedYEBYIRVSJS6127-68-15 10:50:01
1.0[facility] BdfvuncEQUMPJFHVH4199-65-44 10:50:019.7[facility]annCHEM PANEL
2018 05:42:00





             Test Item    Value        Reference Range Interpretation Comments

 

             B/C Ratio (test code = B/C Ratio) 17 1         6-25                

      



[facility] HermannCHEM HPWOC4800-22-76 05:42:004.3[facility] HermannCHEM PANEL
2018 05:42:00





             Test Item    Value        Reference Range Interpretation Comments

 

             A/G Ratio (test code = A/G Ratio) 0.7 1        0.7-1.6             

      



[facility] HermannCHEM BCOTR4055-29-08 05:42:0014.4[facility] HermannCHEM PANEL
2018 05:42:28357Pxowiboe HermannCHEM OUZWY1722-68-24 05:42:0076Mem[facility] 
HermannCHEM DBPSN6546-92-30 05:42:0035[facility] HermannCHEM EVVBA8502-96-81 
05:42:002.8[facility] HermannCHEM QJLDF7467-44-92 05:42:007.1[facility]annCHEM 
DNLCI1017-06-27 05:42:008.7[facility] HermannCHEM DVRUI7744-29-16 05:42:0018
[facility]annCHEM GNQBR5655-87-67 05:42:000.3[facility] HermannCHEM PANEL
2018 05:42:004.4[facility] HermannCHEM XCUGD5251-45-33 05:42:78025Efoqemsn 
HermannCHEM JHAFL6562-61-13 05:42:0023[facility] HermannCHEM SVSJR4344-27-30 
05:42:42169Oeyfuakm HermannCHEM NLWQM3428-71-67 05:42:001.01[facility] HermannCHEM
SGBGX1664-06-60 05:42:0017[facility]annCHEM FTBKW9574-11-90 05:42:75844
[facility] KygwccnVHYXOOLJSQ0535-36-65 05:42:000.6[facility]annHEMATOLOGY
2018 05:42:004.8[facility] IzbxhevZYGRFXBQWP2083-47-30 05:42:000.7[facility] 
LavbdpqEBXYYFOUJE6516-09-30 05:42:001.9[facility] ZaifhebPZWPLOARCX8051-58-59 
05:42:009.4[facility] TxhogysQPTTTBPCPA7389-38-22 05:42:000.2[facility] [place]
LSTSFHUARH1392-61-76 05:42:002.9[facility] YmzhiszSKWTODPDJM1159-92-59 05:42:00
62.2[facility] SsqsvbfCPRFFAZDNQ9274-23-76 05:42:0024.9[facility]annHEMATOLOGY
2018 05:42:00





             Test Item    Value        Reference Range Interpretation Comments

 

             MCH (test code = MCH) 27.5 pg      27.0-31.0                 



[facility] OlzirqoCOMILOVTHE9549-67-22 05:42:0085.3[facility]annHEMATOLOGY
2018 05:42:0043.9[facility] TlkiwajTLSWPKPGQR7039-67-03 05:42:0014.2[facility]
TgvnlqnSENMXLZYGY3805-66-91 05:42:007.7[facility] BqvxkdjCNRILZOJCZ2072-65-37 
05:42:005.15[facility] DlxajnoZQSXFZAPOA4279-60-87 05:42:008.4[facility]
TCMJKRDJQZ7885-11-35 05:42:0032.3[facility] BqfobnoNXHSHRCAZC2771-16-83 05:42:00
17.3[facility] XgolkcpDPGBPOWLNQ7970-95-27 05:42:71087Zmxcewey HermannCHEM PANEL
2018 09:36:004.4[facility] HermannCHEM DVJDG7492-77-14 09:36:00





             Test Item    Value        Reference Range Interpretation Comments

 

             A/G Ratio (test code = A/G Ratio) 0.6 1        0.7-1.6             

      



[facility] HermannCHEM XNKLD1970-52-83 09:36:00





             Test Item    Value        Reference Range Interpretation Comments

 

             B/C Ratio (test code = B/C Ratio) 17 1         6-25                

      



[facility] HermannCHEM FPFME6094-56-93 09:36:0011.3[facility] HermannCHEM PANEL
2018 09:36:70082Fnhmjsbn HermannCHEM QBZNR3239-08-57 09:36:000.84[facility] 
HermannCHEM AEJVM5464-69-57 09:36:77645Cdhkwcbw HermannCHEM ULWIL6033-05-30 
09:36:0099[facility] HermannCHEM AFMVN2458-05-67 09:36:0014Mem[facility] HermannCHEM 
DJUYK2422-93-09 09:36:0079[facility] HermannCHEM VQTTD7465-45-44 09:36:000.3
[facility] HermannCHEM JAPQT7187-02-16 09:36:0014Mem[facility] HermannCHEM PANEL
2018 09:36:0043[facility] HermannCHEM WYNTC8487-80-84 09:36:007.1[facility] 
HermannCHEM VRFLJ1362-20-20 09:36:002.7[facility] HermannCHEM VFGPG7318-48-74 
09:36:009.2[facility] HermannCHEM LVNOR8532-13-16 09:36:0021[facility] HermannCHEM 
GGFXK7140-28-04 09:36:004.3[facility] HermannCHEM ZQJGF2403-55-54 09:36:72013
[facility] OwpjhfpJWHWJFSLPC9400-00-30 09:36:0032.5[facility]annHEMATOLOGY
2018 09:36:0017.5[facility] NdmdfcfZXAYNTXGJE0427-73-33 09:36:69238Gbsppfbp 
EcqaqdnDJGDZYWXTN4273-09-16 09:36:008.5[facility] TlvuxfuKZFDHMPOGJ5691-87-08 
09:36:006.6[facility] ExxofltEHQKRLYAKP4953-58-07 09:36:005.17[facility]
OWJWZUNELS2332-96-16 09:36:0086.1[facility] VdkozrdAVIHQGAGPC0259-70-40 09:36:00
44.5[facility] SejacdtPPRKLOULEY1739-06-93 09:36:00





             Test Item    Value        Reference Range Interpretation Comments

 

             MCH (test code = MCH) 28.0 pg      27.0-31.0                 



[facility] OmrdhlsVMXELGWOCY2049-07-13 09:36:0014.5[facility]annHEMATOLOGY
2018 09:36:001.7[facility] DppptbwVITJNNYULK5713-50-71 09:36:000.7[facility] 
JhqetjoSVKYQKUNHZ8068-55-58 09:36:000.2[facility] KyntbcyMPISUJFRGX0579-59-84 
09:36:0026.1[facility] WgchsfiUWSAFEEXKO5130-46-62 09:36:0059.3[facility]
HUQZPRRDVE9462-72-14 09:36:000.8[facility] DjyzuvoQRROYJRKZH0923-69-91 09:36:00
10.5[facility] IyikigeLYWVXAVPQH5164-16-35 09:36:003.3[facility] HermannHEMATOLOGY
2018 09:36:003.9[facility] EfhrxuaTAPBOXWRXL3928-04-35 21:02:009.1[facility] 
HermannCHEM KDQDY3715-87-46 07:07:0074[facility] HermannCHEM EGHIF1763-69-06 
07:07:0012[facility] HermannCHEM JPVLO6259-24-09 07:07:000.75[facility] HermannCHEM 
BHDUI5921-85-65 07:07:75007Skwfvyut HermannCHEM FUPFK3757-43-27 07:07:002.9
[facility] HermannCHEM VGSXK6048-47-12 07:07:004.4[facility] HermannCHEM PANEL
2018 07:07:00





             Test Item    Value        Reference Range Interpretation Comments

 

             A/G Ratio (test code = A/G Ratio) 0.7 1        0.7-1.6             

      



[facility] HermannCHEM NKISL1142-63-19 07:07:000.4[facility] HermannCHEM PANEL
2018 07:07:0071[facility] HermannCHEM MCTDS8638-52-18 07:07:0015Mem[facility] 
HermannCHEM JRESW6331-56-24 07:07:0028[facility] HermannCHEM COVOT7415-20-09 
07:07:004.4[facility] HermannCHEM RDWUK3288-79-18 07:07:92446Jfwwplst HermannCHEM 
KFIAB4464-75-08 07:07:0025[facility] HermannCHEM YOBZP0171-17-12 07:07:41902
[facility] HermannCHEM SJPTS2959-81-59 07:07:00





             Test Item    Value        Reference Range Interpretation Comments

 

             B/C Ratio (test code = B/C Ratio) 16 1         6-25                

      



[facility] HermannCHEM SGDJR2163-79-53 07:07:008.7[facility] HermannCHEM PANEL
2018 07:07:0012.4[facility] HermannCHEM HFNAV4899-10-32 07:07:007.3[facility] 
LqloksiUMCKGUOVQF7712-30-51 07:07:93508Djooldet QwlrfgxESUOZSZCJT6007-21-96 
07:07:008.7[facility] YzjkzggHHVHANBGOD4467-43-54 07:07:006.9[facility]
KBUDSBVNWU2687-79-52 07:07:005.30[facility] NqwehvxIBIYKPIXYY0473-11-20 07:07:00
32.8[facility] NwtezpsUHUFHRPBAJ9669-38-15 07:07:00





             Test Item    Value        Reference Range Interpretation Comments

 

             MCH (test code = MCH) 27.9 pg      27.0-31.0                 



[facility] RcjrayuJETZJBPDMI6531-95-66 07:07:0014.8[facility] HermannHEMATOLOGY
2018 07:07:0085.0[facility] DinozwgBGTTLWKXLN0867-07-10 07:07:0045.1[facility]
IongiiyNGSCAUMATM2353-96-15 07:07:0017.5[facility] HuxkargDNSIIQGLKY7260-76-36 
07:07:000.2[facility] ZseaadsGVOBRIIUIT9559-88-08 07:07:002.0[facility]
BTFAYVOHTX2007-59-64 07:07:000.7[facility] KlylkimDPTYNBQPDH4416-51-34 07:07:002.4
[facility] AjbzxewAWGDSNMELS7562-16-40 07:07:000.6[facility] HermannHEMATOLOGY
2018 07:07:004.0[facility] HpxectuDRKTSVCJES0809-44-61 07:07:0010.4[facility] 
AmrumznUSZMCBXLBL9425-05-28 07:07:00Normal (18 2:07 AM)[facility]
PXSRDEJKHL5445-63-41 07:07:0058.1[facility] TatmdciEOPHZXOSVN2392-39-91 07:07:00
Normal (18 2:07 AM)[facility] SwffsrdEWQIUJJXOW5872-14-74 07:07:0028.5[facility]
KumfwzuTQNCMWSCHG9507-51-34 16:07:000.1[facility] JexrcpuFXYIBRGQLP4123-94-32 
16:07:00Moderate *ABN*(18 11:07 AM)[facility] QifjompBOMYVLDZMN1937-86-48 
06:43:0022.3[facility] HermannCHEM PWXDR8247-24-07 11:45:002.3[facility] HermannCHEM
LEBIC0744-77-25 11:45:003.5[facility] AlfaqooEWGIWQVSBI6097-68-14 11:45:00





             Test Item    Value        Reference Range Interpretation Comments

 

             PT (test code = PT) 14.0 s       12.0-14.7                 



[facility] MsqrtbjHYBVTSVBKL2809-51-37 11:45:00





             Test Item    Value        Reference Range Interpretation Comments

 

             PTT (test code = PTT) 37.6 s       22.9-35.8                 



[facility] EseytpjYNDTYVASAH7522-29-44 11:45:00





             Test Item    Value        Reference Range Interpretation Comments

 

             INR (test code = INR) 1.08 1       0.85-1.17                 



[facility] IbgiggqQDYHKDAEJD7261-56-02 11:45:0013.1[facility] HermannIMMUNOLOGY
2018 11:45:0025.2[facility] HermannCHEM AVEVZ8427-83-15 03:06:000.9[facility] 
NgsarikNKNFTQKULN8732-83-92 03:06:005[facility] DnvhwucRLAFILKWQU1720-00-06 
03:06:0015.8[facility] GoyjhpuJQIBFYFFPA6648-23-75 00:44:00





             Test Item    Value        Reference Range Interpretation Comments

 

             PT (test code = PT) 13.0 s       12.0-14.7                 



[facility] RzdeqzpDINOMXMLQU5192-62-69 00:44:00





             Test Item    Value        Reference Range Interpretation Comments

 

             INR (test code = INR) 0.98 1       0.85-1.17                 



[facility] RrbeyluGSJMHOLHKM6768-15-45 00:44:00





             Test Item    Value        Reference Range Interpretation Comments

 

             PTT (test code = PTT) 33.2 s       22.9-35.8                 



[facility] HospitalBL[facility] EAZUFBO1871-63-24 23:51:00Negative (5/3/18 6:51 PM)
[facility]annUR[facility] AND CHYAX6765-12-57 23:35:000.2[facility]annURINE AND 
LZUXP5939-30-04 23:35:00Negative (5/3/18 6:35 PM)[facility] STOOL
2018 23:35:00Negative (5/3/18 6:35 PM)[facility] STOOL
2018 23:35:00Negative *NA*(5/3/18 6:35 PM)[facility] STO[facility]
2018 23:35:00Negative *NA*(5/3/18 6:35 PM)[facility] STOOL
2018 23:35:00Trace *ABN*(5/3/18 6:35 PM)[facility] STOOL
2018 23:35:00Small *ABN*(5/3/18 6:35 PM)[facility] AND STOOL
2018 23:35:00





             Test Item    Value        Reference Range Interpretation Comments

 

             UA Spec Grav (test code = UA Spec 1.020 1                          

      



             Grav)                                               



[facility]annUR[facility] AND MZJYR1873-45-05 23:35:00





             Test Item    Value        Reference Range Interpretation Comments

 

             UA pH (test code = UA pH) 6.0 1        5.0-8.0                   



[facility]annUR[facility] AND LBYIT0915-54-93 23:35:00Yellow *NA*(5/3/18 6:35 PM)
[facility]ann[facility] AND NCIWF9638-88-79 23:35:00Trace *ABN*(5/3/18 6:35 PM)
[facility]ann[facility] AND OQDNB3510-08-15 23:35:00Slight Cloudy (5/3/18 6:35 PM)
[facility]annUR[facility] AND HCOJL9631-15-33 23:35:000-2 (5/3/18 6:35 PM)[facility] 
BnzptdqAWPGHNCBMJ0649-22-61 23:20:000.1[facility] EjmgbuxEGRHYMDIRI6705-83-16 
23:20:00Normal (5/3/18 6:20 PM)[facility] HermannBLOOD NICSSWK8430-64-95 16:22:00





             Test Item    Value        Reference Range Interpretation Comments

 

             CULTURE (BEAKER) (test No growth in 5 days                         

  



             code = 1095)                                        



BLOOD HZKHQZO6680-66-55 16:22:00





             Test Item    Value        Reference Range Interpretation Comments

 

             CULTURE (BEAKER) (test No growth in 5 days                         

  



             code = 1095)                                        



(MANUAL DIFFERENTIAL)2017 22:29:00





             Test Item    Value        Reference Range Interpretation Comments

 

             TOTAL COUNTED (BEAKER) (test code =                                

        



             1351)                                               

 

             WBC MORPHOLOGY (BEAKER) (test code = Normal                        

         



             487)                                                

 

             PLT MORPHOLOGY (BEAKER) (test code = Normal                        

         



             486)                                                

 

             RBC MORPHOLOGY (BEAKER) (test code = Normal                        

         



             762)                                                



CBC W/PLT COUNT &amp; AUTO AKZHJLSAMESR7338-36-99 22:28:00





             Test Item    Value        Reference Range Interpretation Comments

 

             WHITE BLOOD CELL COUNT (BEAKER) 7.3 K/ L     4.0-10.0              

    



             (test code = 775)                                        

 

             RED BLOOD CELL COUNT (BEAKER) 5.09 M/ L    4.20-5.80               

  



             (test code = 761)                                        

 

             HEMOGLOBIN (BEAKER) (test code = 13.0 GM/DL   13.0-16.8            

     



             410)                                                

 

             HEMATOCRIT (BEAKER) (test code = 42.3 %       40.0-50.0            

     



             411)                                                

 

             MEAN CORPUSCULAR VOLUME (BEAKER) 83.0 fL      82.0-98.0            

     



             (test code = 753)                                        

 

             MEAN CORPUSCULAR HEMOGLOBIN 25.6 pg      27.0-33.0    L            



             (BEAKER) (test code = 751)                                        

 

             MEAN CORPUSCULAR HEMOGLOBIN CONC 30.8 GM/DL   32.0-36.0    L       

     



             (BEAKER) (test code = 752)                                        

 

             RED CELL DISTRIBUTION WIDTH 18.0 %       10.3-14.2    H            



             (BEAKER) (test code = 412)                                        

 

             PLATELET COUNT (BEAKER) (test 331 K/CU MM  150-430                 

  



             code = 756)                                         

 

             MEAN PLATELET VOLUME (BEAKER) 8.5 fL       6.5-10.5                

  



             (test code = 754)                                        

 

             NUCLEATED RED BLOOD CELLS 0 /100 WBC   0-0                       



             (BEAKER) (test code = 413)                                        

 

             NEUTROPHILS RELATIVE PERCENT 65 %                                  

 



             (BEAKER) (test code = 429)                                        

 

             LYMPHOCYTES RELATIVE PERCENT 23 %                                  

 



             (BEAKER) (test code = 430)                                        

 

             MONOCYTES RELATIVE PERCENT 8 %                                    



             (BEAKER) (test code = 431)                                        

 

             EOSINOPHILS RELATIVE PERCENT 3 %                                   

 



             (BEAKER) (test code = 432)                                        

 

             BASOPHILS RELATIVE PERCENT 1 %                                    



             (BEAKER) (test code = 437)                                        

 

             NEUTROPHILS ABSOLUTE COUNT 4.75 K/ L    1.80-8.00                 



             (BEAKER) (test code = 670)                                        

 

             LYMPHOCYTES ABSOLUTE COUNT 1.68 K/ L    1.48-4.50                 



             (BEAKER) (test code = 414)                                        

 

             MONOCYTES ABSOLUTE COUNT (BEAKER) 0.55 K/ L    0.00-1.30           

      



             (test code = 415)                                        

 

             EOSINOPHILS ABSOLUTE COUNT 0.24 K/ L    0.00-0.50                 



             (BEAKER) (test code = 416)                                        

 

             BASOPHILS ABSOLUTE COUNT (BEAKER) 0.05 K/ L    0.00-0.20           

      



             (test code = 417)                                        



[id number] METABOLIC OXGNR5008-54-27 11:11:00





             Test Item    Value        Reference Range Interpretation Comments

 

             SODIUM (BEAKER) 138 meq/L    136-145                   



             (test code = 381)                                        

 

             POTASSIUM (BEAKER) 4.0 meq/L    3.5-5.1                   



             (test code = 379)                                        

 

             CHLORIDE (BEAKER) 108 meq/L           H            



             (test code = 382)                                        

 

             CO2 (BEAKER) (test 23 meq/L     22-29                     



             code = 355)                                         

 

             BLOOD UREA NITROGEN 11 mg/dL     7-21                      



             (BEAKER) (test code                                        



             = 354)                                              

 

             CREATININE (BEAKER) 0.74 mg/dL   0.57-1.25                 



             (test code = 358)                                        

 

             GLUCOSE RANDOM 124 mg/dL           H            



             (BEAKER) (test code                                        



             = 652)                                              

 

             CALCIUM (BEAKER) 8.9 mg/dL    8.4-10.2                  



             (test code = 697)                                        

 

             EGFR (BEAKER) (test 150 mL/min/1.73                           ESTIM

ATED GFR IS



             code = 1092) sq m                                   NOT AS ACCURATE

 AS



                                                                 CREATININE



                                                                 CLEARANCE IN



                                                                 PREDICTING



                                                                 GLOMERULAR



                                                                 FILTRATION RATE

.



                                                                 ESTIMATED GFR I

S



                                                                 NOT APPLICABLE 

FOR



                                                                 DIALYSIS PATIEN

TS.



URINE [id number] 09:56:00





             Test Item    Value        Reference Range Interpretation Comments

 

             CULTURE (BEAKER) (test <10,000 col/mL skin                         

  



             code = 1095) jaime                                  



COMPREHENSIVE METABOLIC [id number] 08:49:00





             Test Item    Value        Reference Range Interpretation Comments

 

             TOTAL PROTEIN 7.3 gm/dL    6.0-8.3                   



             (BEAKER) (test code =                                        



             770)                                                

 

             ALBUMIN (BEAKER) 3.3 g/dL     3.5-5.0      L            



             (test code = 1145)                                        

 

             ALKALINE PHOSPHATASE 89 U/L                           



             (BEAKER) (test code =                                        



             346)                                                

 

             BILIRUBIN TOTAL 1.4 mg/dL    0.2-1.2      H            



             (BEAKER) (test code =                                        



             377)                                                

 

             SODIUM (BEAKER) (test 137 meq/L    136-145                   



             code = 381)                                         

 

             POTASSIUM (BEAKER) 3.7 meq/L    3.5-5.1                   



             (test code = 379)                                        

 

             CHLORIDE (BEAKER) 108 meq/L           H            



             (test code = 382)                                        

 

             CO2 (BEAKER) (test 17 meq/L     22-29        L            



             code = 355)                                         

 

             BLOOD UREA NITROGEN 12 mg/dL     7-21                      



             (BEAKER) (test code =                                        



             354)                                                

 

             CREATININE (BEAKER) 0.78 mg/dL   0.57-1.25                 



             (test code = 358)                                        

 

             GLUCOSE RANDOM 119 mg/dL           H            



             (BEAKER) (test code =                                        



             652)                                                

 

             CALCIUM (BEAKER) 8.6 mg/dL    8.4-10.2                  



             (test code = 697)                                        

 

             AST (SGOT) (BEAKER) 13 U/L       5-34                      



             (test code = 353)                                        

 

             ALT (SGPT) (BEAKER) 28 U/L       6-55                      



             (test code = 347)                                        

 

             EGFR (BEAKER) (test 141                                    ESTIMATE

D GFR IS



             code = 1092) mL/min/1.73 sq                           NOT AS ACCURA

TE AS



                          m                                      CREATININE



                                                                 CLEARANCE IN



                                                                 PREDICTING



                                                                 GLOMERULAR



                                                                 FILTRATION RATE

.



                                                                 ESTIMATED GFR I

S



                                                                 NOT APPLICABLE 

FOR



                                                                 DIALYSIS PATIEN

TS.



CBC W/PLT COUNT &amp; AUTO [id number] 08:46:00





             Test Item    Value        Reference Range Interpretation Comments

 

             WHITE BLOOD CELL COUNT (BEAKER) 9.4 K/ L     4.0-10.0              

    



             (test code = 775)                                        

 

             RED BLOOD CELL COUNT (BEAKER) 4.79 M/ L    4.20-5.80               

  



             (test code = 761)                                        

 

             HEMOGLOBIN (BEAKER) (test code = 12.8 GM/DL   13.0-16.8    L       

     



             410)                                                

 

             HEMATOCRIT (BEAKER) (test code = 40.0 %       40.0-50.0            

     



             411)                                                

 

             MEAN CORPUSCULAR VOLUME (BEAKER) 83.4 fL      82.0-98.0            

     



             (test code = 753)                                        

 

             MEAN CORPUSCULAR HEMOGLOBIN 26.7 pg      27.0-33.0    L            



             (BEAKER) (test code = 751)                                        

 

             MEAN CORPUSCULAR HEMOGLOBIN CONC 32.0 GM/DL   32.0-36.0            

     



             (BEAKER) (test code = 752)                                        

 

             RED CELL DISTRIBUTION WIDTH 18.2 %       10.3-14.2    H            



             (BEAKER) (test code = 412)                                        

 

             PLATELET COUNT (BEAKER) (test 313 K/CU MM  150-430                 

  



             code = 756)                                         

 

             MEAN PLATELET VOLUME (BEAKER) 8.6 fL       6.5-10.5                

  



             (test code = 754)                                        

 

             NUCLEATED RED BLOOD CELLS 0 /100 WBC   0-0                       



             (BEAKER) (test code = 413)                                        

 

             NEUTROPHILS RELATIVE PERCENT 70 %                                  

 



             (BEAKER) (test code = 429)                                        

 

             LYMPHOCYTES RELATIVE PERCENT 16 %                                  

 



             (BEAKER) (test code = 430)                                        

 

             MONOCYTES RELATIVE PERCENT 12 %                                   



             (BEAKER) (test code = 431)                                        

 

             EOSINOPHILS RELATIVE PERCENT 1 %                                   

 



             (BEAKER) (test code = 432)                                        

 

             BASOPHILS RELATIVE PERCENT 1 %                                    



             (BEAKER) (test code = 437)                                        

 

             NEUTROPHILS ABSOLUTE COUNT 6.54 K/ L    1.80-8.00                 



             (BEAKER) (test code = 670)                                        

 

             LYMPHOCYTES ABSOLUTE COUNT 1.50 K/ L    1.48-4.50                 



             (BEAKER) (test code = 414)                                        

 

             MONOCYTES ABSOLUTE COUNT (BEAKER) 1.13 K/ L    0.00-1.30           

      



             (test code = 415)                                        

 

             EOSINOPHILS ABSOLUTE COUNT 0.13 K/ L    0.00-0.50                 



             (BEAKER) (test code = 416)                                        

 

             BASOPHILS ABSOLUTE COUNT (BEAKER) 0.06 K/ L    0.00-0.20           

      



             (test code = 417)                                        



0.00URINALYSIS W/ [id number] 20:36:00





             Test Item    Value        Reference Range Interpretation Comments

 

             COLOR (BEAKER) (test code = 470) Yellow                            

     

 

             CLARITY (BEAKER) (test code = 469) Hazy                            

       

 

             SPECIFIC GRAVITY UA (BEAKER) (test 1.012        1.001-1.035        

       



             code = 468)                                         

 

             PH UA (BEAKER) (test code = 467) 5.5          5.0-8.0              

     

 

             PROTEIN UA (BEAKER) (test code = 100 mg/dL    Negative     A       

     



             464)                                                

 

             GLUCOSE UA (BEAKER) (test code = Negative     Negative             

     



             365)                                                

 

             KETONES UA (BEAKER) (test code = Negative     Negative             

     



             371)                                                

 

             BILIRUBIN UA (BEAKER) (test code = Negative     Negative           

       



             462)                                                

 

             BLOOD UA (BEAKER) (test code = 461) Moderate     Negative     A    

        

 

             NITRITE UA (BEAKER) (test code = Negative     Negative             

     



             465)                                                

 

             LEUKOCYTE ESTERASE UA (BEAKER) Large        Negative     A         

   



             (test code = 466)                                        

 

             UROBILINOGEN UA (BEAKER) (test code 3.0 mg/dL    0.2-1.0      H    

        



             = 463)                                              

 

             RBC UA (BEAKER) (test code = 519) 19 /HPF                          

      

 

             WBC UA (BEAKER) (test code = 520) 182 /HPF                         

      

 

             SOURCE(BEAKER) (test code = 2795)                                  

      



BASIC METABOLIC [id number] 17:00:00





             Test Item    Value        Reference Range Interpretation Comments

 

             SODIUM (BEAKER) 140 meq/L    136-145                   



             (test code = 381)                                        

 

             POTASSIUM (BEAKER) 3.7 meq/L    3.5-5.1                   



             (test code = 379)                                        

 

             CHLORIDE (BEAKER) 112 meq/L           H            



             (test code = 382)                                        

 

             CO2 (BEAKER) (test 17 meq/L     22-29        L            



             code = 355)                                         

 

             BLOOD UREA NITROGEN 23 mg/dL     7-21         H            



             (BEAKER) (test code                                        



             = 354)                                              

 

             CREATININE (BEAKER) 1.00 mg/dL   0.57-1.25                 



             (test code = 358)                                        

 

             GLUCOSE RANDOM 102 mg/dL                        



             (BEAKER) (test code                                        



             = 652)                                              

 

             CALCIUM (BEAKER) 8.7 mg/dL    8.4-10.2                  



             (test code = 697)                                        

 

             EGFR (BEAKER) (test 106 mL/min/1.73                           ESTIM

ATED GFR IS



             code = 1092) sq m                                   NOT AS ACCURATE

 AS



                                                                 CREATININE



                                                                 CLEARANCE IN



                                                                 PREDICTING



                                                                 GLOMERULAR



                                                                 FILTRATION RATE

.



                                                                 ESTIMATED GFR I

S



                                                                 NOT APPLICABLE 

FOR



                                                                 DIALYSIS PATIEN

TS.



Specimen slightly ictericCBC W/PLT COUNT &amp; AUTO [id number] 
12:18:00





             Test Item    Value        Reference Range Interpretation Comments

 

             WHITE BLOOD CELL COUNT (BEAKER) 16.4 K/ L    4.0-10.0     H        

    



             (test code = 775)                                        

 

             RED BLOOD CELL COUNT (BEAKER) 5.22 M/ L    4.20-5.80               

  



             (test code = 761)                                        

 

             HEMOGLOBIN (BEAKER) (test code = 14.4 GM/DL   13.0-16.8            

     



             410)                                                

 

             HEMATOCRIT (BEAKER) (test code = 42.9 %       40.0-50.0            

     



             411)                                                

 

             MEAN CORPUSCULAR VOLUME (BEAKER) 82.2 fL      82.0-98.0            

     



             (test code = 753)                                        

 

             MEAN CORPUSCULAR HEMOGLOBIN 27.5 pg      27.0-33.0                 



             (BEAKER) (test code = 751)                                        

 

             MEAN CORPUSCULAR HEMOGLOBIN CONC 33.5 GM/DL   32.0-36.0            

     



             (BEAKER) (test code = 752)                                        

 

             RED CELL DISTRIBUTION WIDTH 16.2 %       10.3-14.2    H            



             (BEAKER) (test code = 412)                                        

 

             PLATELET COUNT (BEAKER) (test 329 K/CU MM  150-430                 

  



             code = 756)                                         

 

             MEAN PLATELET VOLUME (BEAKER) 8.2 fL       6.5-10.5                

  



             (test code = 754)                                        

 

             NUCLEATED RED BLOOD CELLS 0 /100 WBC   0-0                       



             (BEAKER) (test code = 413)                                        

 

             NEUTROPHILS RELATIVE PERCENT 83 %                                  

 



             (BEAKER) (test code = 429)                                        

 

             LYMPHOCYTES RELATIVE PERCENT 7 %                                   

 



             (BEAKER) (test code = 430)                                        

 

             MONOCYTES RELATIVE PERCENT 9 %                                    



             (BEAKER) (test code = 431)                                        

 

             EOSINOPHILS RELATIVE PERCENT 0 %                                   

 



             (BEAKER) (test code = 432)                                        

 

             BASOPHILS RELATIVE PERCENT 0 %                                    



             (BEAKER) (test code = 437)                                        

 

             NEUTROPHILS ABSOLUTE COUNT 1.62 K/ L    1.80-8.00    L            



             (BEAKER) (test code = 670)                                        

 

             LYMPHOCYTES ABSOLUTE COUNT 1.15 K/ L    1.48-4.50    L            



             (BEAKER) (test code = 414)                                        

 

             MONOCYTES ABSOLUTE COUNT (BEAKER) 1.56 K/ L    0.00-1.30    H      

      



             (test code = 415)                                        

 

             EOSINOPHILS ABSOLUTE COUNT 0.01 K/ L    0.00-0.50                 



             (BEAKER) (test code = 416)                                        

 

             BASOPHILS ABSOLUTE COUNT (BEAKER) 0.02 K/ L    0.00-0.20           

      



             (test code = 417)                                        



(MANUAL DIFFERENTIAL)2017 12:18:00





             Test Item    Value        Reference Range Interpretation Comments

 

             TOTAL COUNTED (BEAKER) (test code =                                

        



             1351)                                               

 

             WBC MORPHOLOGY (BEAKER) (test code = Normal                        

         



             487)                                                

 

             PLT MORPHOLOGY (BEAKER) (test code = Normal                        

         



             486)                                                

 

             RBC MORPHOLOGY (BEAKER) (test code = Normal                        

         



             762)

## 2021-11-23 VITALS — OXYGEN SATURATION: 94 %

## 2021-11-23 LAB
ALBUMIN SERPL BCP-MCNC: 2.5 G/DL (ref 3.4–5)
ALP SERPL-CCNC: 111 U/L (ref 45–117)
ALT SERPL W P-5'-P-CCNC: 52 U/L (ref 12–78)
AST SERPL W P-5'-P-CCNC: 26 U/L (ref 15–37)
BUN BLD-MCNC: 10 MG/DL (ref 7–18)
GLUCOSE SERPLBLD-MCNC: 90 MG/DL (ref 74–106)
HCT VFR BLD CALC: 43.3 % (ref 39.6–49)
LIPASE SERPL-CCNC: 58 U/L (ref 73–393)
LYMPHOCYTES # SPEC AUTO: 2.2 K/UL (ref 0.7–4.9)
PMV BLD: 7.8 FL (ref 7.6–11.3)
POTASSIUM SERPL-SCNC: 3.9 MMOL/L (ref 3.5–5.1)
RBC # BLD: 4.98 M/UL (ref 4.33–5.43)

## 2021-11-23 RX ADMIN — CHOLESTYRAMINE SCH GM: 4 POWDER, FOR SUSPENSION ORAL at 16:55

## 2021-11-23 RX ADMIN — HYDROCODONE BITARTRATE AND ACETAMINOPHEN PRN TAB: 7.5; 325 TABLET ORAL at 21:36

## 2021-11-23 RX ADMIN — PROMETHAZINE HYDROCHLORIDE PRN MG: 25 INJECTION INTRAMUSCULAR; INTRAVENOUS at 15:31

## 2021-11-23 RX ADMIN — PROMETHAZINE HYDROCHLORIDE PRN MG: 25 INJECTION INTRAMUSCULAR; INTRAVENOUS at 21:36

## 2021-11-23 RX ADMIN — PROMETHAZINE HYDROCHLORIDE PRN MG: 25 INJECTION INTRAMUSCULAR; INTRAVENOUS at 04:16

## 2021-11-23 RX ADMIN — PROMETHAZINE HYDROCHLORIDE PRN MG: 25 INJECTION INTRAMUSCULAR; INTRAVENOUS at 09:19

## 2021-11-23 RX ADMIN — SODIUM CHLORIDE SCH: 0.9 INJECTION, SOLUTION INTRAVENOUS at 09:41

## 2021-11-23 RX ADMIN — SODIUM CHLORIDE SCH MLS: 0.9 INJECTION, SOLUTION INTRAVENOUS at 04:12

## 2021-11-23 RX ADMIN — SODIUM CHLORIDE SCH MLS: 0.9 INJECTION, SOLUTION INTRAVENOUS at 21:37

## 2021-11-23 RX ADMIN — SODIUM CHLORIDE SCH MLS: 0.9 INJECTION, SOLUTION INTRAVENOUS at 13:11

## 2021-11-23 RX ADMIN — Medication SCH APPL: at 12:08

## 2021-11-23 RX ADMIN — HYDROCODONE BITARTRATE AND ACETAMINOPHEN PRN TAB: 7.5; 325 TABLET ORAL at 15:42

## 2021-11-23 NOTE — P.PN
Subjective


Date of Service: 11/23/21


Primary Care Provider: Jesusita


Chief Complaint: intractable diarrhea


Subjective: No new changes, Improving (no reported diarhea over the night.)





Review of Systems


10-point ROS is otherwise unremarkable


Gastrointestinal: Diarrhea





Physical Examination





- Vital Signs


Temperature: 97.3 F


Blood Pressure: 132/72


Pulse: 82


Respirations: 18


Pulse Ox (%): 96





- Physical Exam


General: Alert, In no apparent distress


HEENT: Atraumatic, PERRLA, EOMI


Neck: Supple, JVD not distended


Respiratory: Clear to auscultation bilaterally, Normal air movement


Cardiovascular: Regular rate/rhythm, Normal S1 S2


Gastrointestinal: Normal bowel sounds, No tenderness


Musculoskeletal: No tenderness


Integumentary: No rashes


Neurological: Normal speech, Normal tone, Normal affect


Lymphatics: No axilla or inguinal lymphadenopathy





- Studies


Laboratory Data (last 24 hrs)





11/22/21 21:04: WBC 11.20 H D, Hgb 15.3  D, Hct 47.1  D, Plt Count 372  D


11/22/21 20:09: Sodium 142, Potassium 4.0, BUN 10, Creatinine 0.57, Glucose 87, 

Total Bilirubin 0.5, AST 27, ALT 49, Alkaline Phosphatase 111, Lipase 74


11/22/21 18:21: WBC 5.70, Hgb 6.6 L*, Hct 20.1 L*, Plt Count 179








Assessment & Plan





- Problems (Diagnosis)


(1) Diarrhea


Current Visit: Yes   Status: Acute   


Plan: 


will admit the patient.  keep NPO.  consult Dr. Cordova


11/23


Have spoken with Dr León.  He has reponded to cholestyramine in the past.  

Will restart him on it. we can discharge him on this.  He has only had one bowel

movement since he was admitted


Qualifiers: 


   Diarrhea type: functional diarrhea   Qualified Code(s): K59.1 - Functional 

diarrhea   





(2) Pressure ulcer


Current Visit: Yes   Status: Acute   


Plan: 


Have debrided in the wound center.  Will continue him on medihoney in the 

hospital. 


Qualifiers: 


   Pressure injury location: buttock   Pressure injury stage: stage 2   

Laterality: left   Qualified Code(s): L89.322 - Pressure ulcer of left buttock, 

stage 2   





(3) Diabetes


Current Visit: Yes   Status: Chronic   


Plan: 


will restart his home insulin.  Check an a1c on the patient. 


Qualifiers: 


   Diabetes mellitus type: type 2   Diabetes mellitus long term insulin use: 

with long term use   Diabetes mellitus complication status: without complication

  Qualified Code(s): E11.9 - Type 2 diabetes mellitus without complications; 

Z79.4 - Long term (current) use of insulin   





(4) Spina bifida


Onset Date: 11/03/17   Current Visit: No   Status: Chronic   


Qualifiers: 


   Spinal region: lumbosacral   Presence of hydrocephalus: unspecified 

hydrocephalus presence   Qualified Code(s): Q05.7 - Lumbar spina bifida without 

hydrocephalus   


Discharge Plan: Home


Plan to discharge in: 24 Hours





- Code Status/Comfort Care


Code Status Assessed: No


Physician Review: Patient Assessed, Agree with Above Assessment and Plan


Critical Care: No


Time Spent Managing Pts Care (In Minutes): 25

## 2021-11-23 NOTE — P.HP
Certification for Inpatient


Patient admitted to: Observation


With expected LOS: <2 Midnights


Patient will require the following post-hospital care: None


Practitioner: I am a practitioner with admitting privileges, knowledge of 

patient current condition, hospital course, and medical plan of care.


Services: Services provided to patient in accordance with Admission requirements

found in Title 42 Section 412.3 of the Code of Federal Regulations





Patient History


Date of Service: 11/22/21


Primary Care Provider: Jesusita


Reason for admission: intractable diarrhea


History of Present Illness: 





Patient has been having diarrhe for the past 2 weeks.  Have treated him with 

lomotil and xifamine  He has been to the wound care center today.  Was having 5 

stools a day.  As he has pressure score on his scrotum and left ischium.  Which 

has worsened.   considering that he is a paraplegic.  Therefore he has diffculty

keeping himself clean.    Decide on sending the patient to the er for admission 

and having him seen by Dr Vazquez. Who has seen him in the past  


Allergies





levofloxacin [From Levaquin] Allergy (Intermediate, Verified 09/18/18 22:08)


   Hives


morphine Allergy (Intermediate, Verified 09/18/18 22:08)


   Hives


sulfamethoxazole [From Bactrim] Allergy (Intermediate, Verified 09/18/18 22:08)


   Hives


ketorolac tromethamine [From Toradol] Allergy (Verified 09/18/18 22:08)


   Nausea/Vomiting


Penicillins Allergy (Verified 11/23/21 02:19)


   Hives/Rash


vancomycin Allergy (Verified 09/18/18 22:08)


   Hives


ondansetron [From Zofran (as hydrochloride)] Adverse Reaction (Mild, Verified 

09/18/18 22:08)


   Nausea/Vomiting


amoxicillin [From Augmentin] Adverse Reaction (Verified 09/18/18 22:08)


   Hives/Rash


ciprofloxacin Adverse Reaction (Verified 09/18/18 22:08)


   Nausea/Vomiting


doxycycline Adverse Reaction (Verified 09/18/18 22:08)


   Nausea/Vomiting


sesame seed Adverse Reaction (Verified 09/18/18 22:08)


   diarrhea


pop corn Adverse Reaction (Severe, Uncoded 09/18/18 22:08)


   diarrhea





Home Medications: 








Oxycodone HCl/Acetaminophen [Oxycodone-Acetaminophen ] 1 tab PO Q6H 

11/23/21 








- Past Medical/Surgical History


Has patient received pneumonia vaccine in the past: No


Diabetic: No


-: Spina Bifida with hydrocephalus


-: Depression


-: Insomnia


-: Incontinence


-: GERD


-: Chronic pain syndrome


-: Muscle wasting


-: Paraplegia


-: Shunt revision


-: Cholecystectomy


-: Foot surgery


-: Hip sx BL


-: Bilateral hip surgery


-: Appendectomy


Psychosocial/ Personal History: Patient currently single.  He has no children.  

He is disabled.





- Family History


  ** Father


-: Hypertension





  ** Mother


-: Hypertension





- Social History


Smoking Status: Current every day smoker


Alcohol use: Yes


CD- Drugs: No


Caffeine use: Yes





Review of Systems


Gastrointestinal: Diarrhea





Physical Examination





- Vital Signs


Temperature: 98.4 F


Blood Pressure: 116/75


Pulse: 86


Respirations: 18


Pulse Ox (%): 97





- Physical Exam


General: Alert, In no apparent distress


HEENT: Atraumatic, PERRLA, Mucous membr. moist/pink, EOMI, Sclerae nonicteric


Neck: Supple, 2+ carotid pulse no bruit, No LAD, Without JVD or thyroid 

abnormality


Respiratory: Clear to auscultation bilaterally, Normal air movement


Cardiovascular: Regular rate/rhythm, Normal S1 S2


Gastrointestinal: Normal bowel sounds, No tenderness


Musculoskeletal: No tenderness


Integumentary: No rashes, Pressure ulcer


Neurological: Normal gait, Normal speech, Normal strength at 5/5 x4 extr, Normal

tone, Normal affect


Lymphatics: No axilla or inguinal lymphadenopathy





- Studies


Laboratory Data (last 24 hrs)





11/22/21 21:04: WBC 11.20 H D, Hgb 15.3  D, Hct 47.1  D, Plt Count 372  D


11/22/21 20:09: Sodium 142, Potassium 4.0, BUN 10, Creatinine 0.57, Glucose 87, 

Total Bilirubin 0.5, AST 27, ALT 49, Alkaline Phosphatase 111, Lipase 74


11/22/21 18:21: WBC 5.70, Hgb 6.6 L*, Hct 20.1 L*, Plt Count 179








Assessment and Plan





- Problems (Diagnosis)


(1) Diarrhea


Current Visit: Yes   Status: Acute   


Plan: 


will admit the patient.  keep NPO.  consult Dr. Cordova


Qualifiers: 


   Diarrhea type: functional diarrhea   Qualified Code(s): K59.1 - Functional 

diarrhea   





(2) Pressure ulcer


Current Visit: Yes   Status: Acute   


Plan: 


Have debrided in the wound center.  Will continue him on medihoney in the hospit

al. 








(3) Diabetes


Current Visit: Yes   Status: Chronic   


Plan: 


will restart his home insulin.  Check an a1c on the patient. 


Qualifiers: 


   Diabetes mellitus type: type 2   Diabetes mellitus long term insulin use: 

with long term use 


Discharge Plan: Home


Plan to discharge in: 48 Hours





- Advance Directives


Does patient have a Living Will: No


Does patient have a Durable POA for Healthcare: No





- Code Status/Comfort Care


Code Status Assessed: Yes


Code Status: Full Code


Critical Care: No


Time Spent Managing Pts Care (In Minutes): 70

## 2021-11-24 VITALS — TEMPERATURE: 97.5 F | DIASTOLIC BLOOD PRESSURE: 89 MMHG | SYSTOLIC BLOOD PRESSURE: 142 MMHG

## 2021-11-24 LAB — C DIFF GDH + TOXINS A+B STL QL IA.RAPID: (no result)

## 2021-11-24 RX ADMIN — PROMETHAZINE HYDROCHLORIDE PRN MG: 25 INJECTION INTRAMUSCULAR; INTRAVENOUS at 09:48

## 2021-11-24 RX ADMIN — CHOLESTYRAMINE SCH GM: 4 POWDER, FOR SUSPENSION ORAL at 09:48

## 2021-11-24 RX ADMIN — PROMETHAZINE HYDROCHLORIDE PRN MG: 25 INJECTION INTRAMUSCULAR; INTRAVENOUS at 04:00

## 2021-11-24 RX ADMIN — SODIUM CHLORIDE SCH MLS: 0.9 INJECTION, SOLUTION INTRAVENOUS at 04:00

## 2021-11-24 RX ADMIN — Medication SCH APPL: at 09:49

## 2021-11-24 NOTE — P.DS
Admission Date: 11/22/21


Discharge Date: 11/24/21


Primary Care Provider: Jesusita


Disposition: ROUTINE DISCHARGE


Discharge Condition: GOOD


Reason for Admission: intractable diarrhea





- Problems


(1) Diarrhea


Current Visit: Yes   Status: Acute   


Qualifiers: 


   Diarrhea type: functional diarrhea   Qualified Code(s): K59.1 - Functional 

diarrhea   





(2) Pressure ulcer


Current Visit: Yes   Status: Acute   


Qualifiers: 


   Pressure injury location: buttock   Pressure injury stage: stage 2   

Laterality: left   Qualified Code(s): L89.322 - Pressure ulcer of left buttock, 

stage 2   





(3) Diabetes


Current Visit: Yes   Status: Chronic   


Qualifiers: 


   Diabetes mellitus type: type 2   Diabetes mellitus long term insulin use: 

with long term use   Diabetes mellitus complication status: without complication

  Qualified Code(s): E11.9 - Type 2 diabetes mellitus without complications; 

Z79.4 - Long term (current) use of insulin   





(4) Spina bifida


Onset Date: 11/03/17   Current Visit: No   Status: Chronic   


Qualifiers: 


   Spinal region: lumbosacral   Presence of hydrocephalus: unspecified 

hydrocephalus presence   Qualified Code(s): Q05.7 - Lumbar spina bifida without 

hydrocephalus   


Brief History of Present Illness: 





Patient has been having diarrhe for the past 2 weeks.  Have treated him with 

lomotil and xifamine  He has been to the wound care center today.  Was having 5 

stools a day.  As he has pressure score on his scrotum and left ischium.  Which 

has worsened.   considering that he is a paraplegic.  Therefore he has diffculty

keeping himself clean.    Decide on sending the patient to the er for admission 

and having him seen by Dr Vazquez. Who has seen him in the past  


Hospital Course: 





Patient was sent from the wound care center.  He had normal labs.  The patient 

has soft stools here.   He has normal studies.   Was seen by Dr. Cordova.   who

reported he has done well on cholestyramine in the past.  Have started him on 

this.  The patient was complainting about pain.  Which he has done chronically. 

Have a long history with the patient.  He has assumed the sick role.   The 

patient  has a c diff pending. We can start him on oral flagy if positive.  He 

can follow up with me in the wound care center. 


Vital Signs/Physical Exam: 














Temp Pulse Resp BP Pulse Ox


 


 97.5 F   77   18   142/89 H  96 


 


 11/24/21 07:53  11/24/21 07:53  11/24/21 07:53  11/24/21 07:53  11/24/21 07:53








General: Alert, In no apparent distress


HEENT: Atraumatic, PERRLA, EOMI


Neck: Supple, JVD not distended


Respiratory: Clear to auscultation bilaterally, Normal air movement


Cardiovascular: Regular rate/rhythm, Normal S1 S2


Gastrointestinal: Normal bowel sounds, No tenderness


Musculoskeletal: No tenderness


Integumentary: No rashes


Neurological: Normal speech, Normal tone, Normal affect


Lymphatics: No axilla or inguinal lymphadenopathy


Laboratory Data at Discharge: 














WBC  9.60 K/uL (4.3-10.9)  D 11/23/21  04:10    


 


Hgb  14.4 g/dL (13.6-17.9)   11/23/21  04:10    


 


Hct  43.3 % (39.6-49.0)   11/23/21  04:10    


 


Plt Count  337 K/uL (152-406)   11/23/21  04:10    


 


Sodium  143 mmol/L (136-145)   11/23/21  04:10    


 


Potassium  3.9 mmol/L (3.5-5.1)   11/23/21  04:10    


 


BUN  10 mg/dL (7-18)   11/23/21  04:10    


 


Creatinine  0.53 mg/dL (0.55-1.3)  L  11/23/21  04:10    


 


Glucose  90 mg/dL ()   11/23/21  04:10    


 


Total Bilirubin  0.5 mg/dL (0.2-1.0)   11/23/21  04:10    


 


AST  26 U/L (15-37)   11/23/21  04:10    


 


ALT  52 U/L (12-78)   11/23/21  04:10    


 


Alkaline Phosphatase  111 U/L ()   11/23/21  04:10    


 


Lipase  58 U/L ()  L  11/23/21  04:10    








Home Medications: 








Oxycodone HCl/Acetaminophen [Oxycodone-Acetaminophen ] 1 tab PO Q6H 

11/23/21 


Cholestyramine [Cholestyramine Resin] 5 gm MC BID 6AM 6PM 90 Days #180 powder 

11/24/21 





New Medications: 


Cholestyramine [Cholestyramine Resin] 5 gm MC BID 6AM 6PM 90 Days #180 powder


Diet: ADA


Activity: Ad rickie


Followup: 


Domingo Mc MD [Primary Care Provider] - 


Time spent managing pt's care (in minutes): 30

## 2022-03-14 ENCOUNTER — HOSPITAL ENCOUNTER (EMERGENCY)
Dept: HOSPITAL 97 - ER | Age: 37
LOS: 1 days | Discharge: HOME | End: 2022-03-15
Payer: COMMERCIAL

## 2022-03-14 DIAGNOSIS — R11.2: ICD-10-CM

## 2022-03-14 DIAGNOSIS — Z88.3: ICD-10-CM

## 2022-03-14 DIAGNOSIS — Z88.8: ICD-10-CM

## 2022-03-14 DIAGNOSIS — Z72.0: ICD-10-CM

## 2022-03-14 DIAGNOSIS — Z88.1: ICD-10-CM

## 2022-03-14 DIAGNOSIS — I10: ICD-10-CM

## 2022-03-14 DIAGNOSIS — K21.9: ICD-10-CM

## 2022-03-14 DIAGNOSIS — Z20.822: ICD-10-CM

## 2022-03-14 DIAGNOSIS — R10.84: Primary | ICD-10-CM

## 2022-03-14 DIAGNOSIS — Z88.0: ICD-10-CM

## 2022-03-14 DIAGNOSIS — Z88.5: ICD-10-CM

## 2022-03-14 LAB
ALBUMIN SERPL BCP-MCNC: 3.5 G/DL (ref 3.4–5)
ALP SERPL-CCNC: 120 U/L (ref 45–117)
ALT SERPL W P-5'-P-CCNC: 52 U/L (ref 12–78)
AST SERPL W P-5'-P-CCNC: 18 U/L (ref 15–37)
BUN BLD-MCNC: 14 MG/DL (ref 7–18)
GLUCOSE SERPLBLD-MCNC: 92 MG/DL (ref 74–106)
HCT VFR BLD CALC: 49.5 % (ref 39.6–49)
LIPASE SERPL-CCNC: 94 U/L (ref 73–393)
LYMPHOCYTES # SPEC AUTO: 2.6 K/UL (ref 0.7–4.9)
PMV BLD: 8.8 FL (ref 7.6–11.3)
POTASSIUM SERPL-SCNC: 3.8 MMOL/L (ref 3.5–5.1)
RBC # BLD: 5.68 M/UL (ref 4.33–5.43)

## 2022-03-14 PROCEDURE — 74177 CT ABD & PELVIS W/CONTRAST: CPT

## 2022-03-14 PROCEDURE — 96375 TX/PRO/DX INJ NEW DRUG ADDON: CPT

## 2022-03-14 PROCEDURE — 80076 HEPATIC FUNCTION PANEL: CPT

## 2022-03-14 PROCEDURE — 85025 COMPLETE CBC W/AUTO DIFF WBC: CPT

## 2022-03-14 PROCEDURE — 96361 HYDRATE IV INFUSION ADD-ON: CPT

## 2022-03-14 PROCEDURE — 36415 COLL VENOUS BLD VENIPUNCTURE: CPT

## 2022-03-14 PROCEDURE — 99284 EMERGENCY DEPT VISIT MOD MDM: CPT

## 2022-03-14 PROCEDURE — 83690 ASSAY OF LIPASE: CPT

## 2022-03-14 PROCEDURE — 96374 THER/PROPH/DIAG INJ IV PUSH: CPT

## 2022-03-14 PROCEDURE — 80048 BASIC METABOLIC PNL TOTAL CA: CPT

## 2022-03-14 NOTE — XMS REPORT
Continuity of Care Document

                            Created on:2022



Patient:JORDAN VERDIN JR

Sex:Male

:1985

External Reference #:[id number]





Demographics







                          Address                   [address]



                                                    [place] [address]

 

                          Home Phone                [phone]

 

                          Work Phone                [phone]

 

                          Mobile Phone              [phone]

 

                          Email Address             DECLINED

 

                          Preferred Language        English

 

                          Marital Status            Unknown

 

                          Church Affiliation     Unknown

 

                          Race                      Unknown

 

                          Additional Race(s)        Black or 



                                                    Unavailable

 

                          Ethnic Group              Unknown









Author







                          Organization              [facility]

t

 

                          Address                   [address] [place] [address] [address]



                                                    [place] [address]

 

                          Phone                     [phone]









Support







                Name            Relationship    Address         Phone

 

                Chito          Mother          [address] [phone]



                                                [place] [address] 

 

                one else per patient Unavailable     Unavailable     Unavailable

 

                Chito          Unavailable     [phone]



                                                [place] [address] 

 

                Chito          Unavailable     Unavailable     [phone]

 

                Chito Pressley       Unavailable     [address] [phone]



                                                [place] [address] 

 

                PATIENT,  ONE ELSE PER Unavailable     Unavailable     Unavailab

le

 

                Chito          Mother          [address] [phone]

[phone]



                                                [place] [address] 









Care Team Providers







                    Name                Role                Phone

 

                    ILEANA HILTON           Primary Care Physician Unavailable

 

                    Jesusita               Attending Clinician Unavailable

 

                    Thomas BOYD           Attending Clinician [phone]

 

                    DARWIN GARAY          Attending Clinician Unavailable

 

                    Jarrod MEADOWS  S       Attending Clinician [phone]

 

                    Marcelle MEADOWS          Attending Clinician [phone]

 

                    MAXIM VILLASEÑOR          Attending Clinician Unavailable

 

                    Kasi JACOBSON  J      Attending Clinician [phone]

 

                    VIV MARIA          Attending Clinician Unavailable

 

                    Candace LAWRENCE  G       Attending Clinician [phone]

 

                    Only,  Db Test      Attending Clinician Unavailable

 

                    Mario Alberto MEADOWS             Attending Clinician [phone]

 

                    MARIO ALBERTO                Attending Clinician Unavailable

 

                    SINGER              Attending Clinician Unavailable

 

                    Singer            Attending Clinician [phone]

 

                    Daisy JACOBSON,  F    Attending Clinician [phone]

 

                    TWAN NAJERA        Attending Clinician Unavailable

 

                    SILVESTRE             Attending Clinician Unavailable

 

                    RALPH TORRES         Attending Clinician Unavailable

 

                    EDIONWE             Admitting Clinician Unavailable

 

                    Marcelle MEADOWS          Admitting Clinician [phone]

 

                    MAXIM VILLASEÑOR          Admitting Clinician Unavailable

 

                    VIV MARIA          Admitting Clinician Unavailable

 

                    RANDALL              Admitting Clinician Unavailable

 

                    RALPH TORRES         Admitting Clinician Unavailable









Payers







           Payer Name Policy Type Policy Number Effective Date Expiration Date S

ource







Advance Directives







           Directive  Decision   Effective  Termination Comments   Source



                                 Date       Date                  

 

           Healthcare Agents on N/A                                         Univ

ersity



           FileNameRelationQuail [facility] Health



           Agent                                                  Medical



           RelationshipCommunicationErl[facility]                                             



           [phone]                                             



           (Mobile)[phone] (Work)                                          

   







Problems







       Condition Condition Condition Status Onset  Resolution Last   Treating Co

mments 

Source



       Name   Details Category        Date   Date   Treatment Clinician        



                                                 Date                 

 

       Complicate Complicate Disease Active                              U

nivers



       d urinary d urinary               1-20                               ity 

of



       tract  tract                00:00:                             [place]



       infection infection               00                                 [facility]

 

       Hyperglyce Hyperglyce Disease Active                              C

[facility]



       jarvis    jarvis                                                 [facility]



       without without               00:00:                             Medical



       ketosis ketosis               00                                 Center

 

       HEADACHE        Diagnosis Active 2018               M

[facility]



                                             09:20:00               l



                HEADACHE               00:00:                             [facility]

n



                                   00                                 



                                                                      



               Active                                                  



                                                                      



                                                                      



                                                                      



                                                                      



                                                                      



                                                                      



                                                                      



                                                                      



                                                                      



                                                                      



                    [facility]                                                  



                                                                      



                                                                      

 

       SHUNT         Diagnosis Active 2018               Mem

oria



       MALFUNCTIO                                20:00:00               l



       N        SHUNT               00:00:                             Jose



              MALFUNCTIO               00                                 



              N                                                       



                                                                      



                 Active                                                  



                                                                      



                                                                      



              2018                                                  



                                                                      



                                                                      



                                                                      



                                                                      



                                                                      



                                                                      



                                                                      



                                                                      



                     [facility]                                                  



                                                                      



                                                                      

 

       ACUTE         Diagnosis Active 2018               Mem

oria



       HEADACHE                                09:20:00               l



                ACUTE               00:00:                             [place]



              HEADACHE               00                                 



                                                                      



                                                                      



              Active                                                  



                                                                      



                                                                      



              2018                                                  



                                                                      



                                                                      



                                                                      



                                                                      



                                                                      



                                                                      



                                                                      



                                                                      



                     [facility]                                                  



                                                                      



                                                                      

 

       Spina  Spina  Disease Active                              [facility]



       bifida bifida                                              [facility]



                                   00:00:                             Medical



                                   00                                 [facility]

 

       Pyelonephr Pyelonephr Disease Active                              C

[facility]



       itis   itis                                                [facility]



                                   00:00:                             Medical



                                   00                                 [place]

 

       Morbid Morbid Disease Active                              [facility]



       obesity obesity               1-04                               ity of



       with body with body               00:00:                             [place]

[place]



       mass index mass index               00                                 Me

dical



       of 50 or of 50 or                                                  Branch



       higher higher                                                  

 

       Morbid Morbid Disease Active                              [facility]



       obesity obesity               1-04                               ity of



       with body with body               00:00:                             [place]

s



       mass index mass index               00                                 Me

dical



       of     of                                                      Branch



       40.0-49.9 40.0-49.9                                                  

 

       Spina         Problem                      2019               Memor

ia



       bifida,                                    14:14:02               l



       unspecifie   Spina                                                  [place]

nn



       d      bifida,                                                  



              unspecifie                                                  



              d                                                       



                                                                      



                                                                      



                                                                      



                                                                      



                                                                      



                                                                      



                                                                      



                                                                      



              2019                                                  



                                                                      



                                                                      



                                                                      



                                                                      



                 [facility]                                                  



                                                                      



                                                                      

 

       Nausea        Problem                      2019               Memor

ia



       with                                      14:14:02               l



       vomiting,   Nausea                                                  [place]

nn



       unspecifie with                                                    



       d      vomiting,                                                  



              unspecifie                                                  



              d                                                       



                                                                      



                                                                      



                                                                      



                                                                      



                                                                      



                                                                      



                                                                      



                                                                      



              2019                                                  



                                                                      



                                                                      



                                                                      



                                                                      



                 [facility]                                                  



                                                                      



                                                                      

 

       Diplopia        Problem                      2019               Mem

oria



                                                 14:14:02               l



                Diplopia                                                  Miguel

n



                                                                      



                                                                      



                                                                      



                                                                      



                                                                      



                                                                      



                                                                      



                                                                      



                                                                      



              2019                                                  



                                                                      



                                                                      



                                                                      



                                                                      



                 [facility]                                                  



                                                                      



                                                                      

 

       Cerebral        Problem                      2019               Mem

oria



       palsy,                                    14:14:02               l



       unspecifie   Cerebral                                                  He

rmann



       d      palsy,                                                  



              unspecifie                                                  



              d                                                       



                                                                      



                                                                      



                                                                      



                                                                      



                                                                      



                                                                      



                                                                      



                                                                      



              2019                                                  



                                                                      



                                                                      



                                                                      



                                                                      



                 [facility]                                                  



                                                                      



                                                                      

 

       Acquired        Problem                      2019               Mem

oria



       absence of                                    14:14:02               l



       other    Acquired                                                  [facility]

n



       specified absence of                                                  



       parts of other                                                   



       digestive specified                                                  



       tract  parts of                                                  



              digestive                                                  



              tract                                                   



                                                                      



                                                                      



                                                                      



                                                                      



                                                                      



                                                                      



                                                                      



                                                                      



                                                                      



              2019                                                  



                                                                      



                                                                      



                                                                      



                                                                      



                 [facility]                                                  



                                                                      



                                                                      

 

       Nicotine        Problem                      2019               Mem

oria



       dependence                                    14:14:02               l



       ,        Nicotine                                                  [facility]

n



       cigarettes dependence                                                  



       ,      ,                                                       



       uncomplica cigarettes                                                  



       huma    ,                                                       



              uncomplica                                                  



              huma                                                     



                                                                      



                                                                      



                                                                      



                                                                      



                                                                      



                                                                      



                                                                      



                                                                      



              2019                                                  



                                                                      



                                                                      



                                                                      



                                                                      



                 [facility]                                                  



                                                                      



                                                                      

 

       Presence        Problem                      2019               Mem

oria



       of                                        14:14:02               l



       cerebrospi   Presence                                                  He

rmann



       nal fluid of                                                      



       drainage cerebrospi                                                  



       device nal fluid                                                  



              drainage                                                  



              device                                                  



                                                                      



                                                                      



                                                                      



                                                                      



                                                                      



                                                                      



                                                                      



                                                                      



                                                                      



              2019                                                  



                                                                      



                                                                      



                                                                      



                                                                      



                 [facility]                                                  



                                                                      



                                                                      

 

       Allergy        Problem                      2019               [facility]

rajeev



       status to                                    14:14:02               l



       other    Allergy                                                  [place]



       antibiotic status to                                                  



       agents other                                                   



       status antibiotic                                                  



              agents                                                  



              status                                                  



                                                                      



                                                                      



                                                                      



                                                                      



                                                                      



                                                                      



                                                                      



                                                                      



                                                                      



              2019                                                  



                                                                      



                                                                      



                                                                      



                                                                      



                 [facility]                                                  



                                                                      



                                                                      

 

       Allergy        Problem                      2019               [facility]

rajeev



       status to                                    14:14:02               l



       other    Allergy                                                  [place]



       drugs, status to                                                  



       medicament other                                                   



       s and  drugs,                                                  



       biological medicament                                                  



       substances s and                                                   



       status biological                                                  



              substances                                                  



              status                                                  



                                                                      



                                                                      



                                                                      



                                                                      



                                                                      



                                                                      



                                                                      



                                                                      



                                                                      



              2019                                                  



                                                                      



                                                                      



                                                                      



                                                                      



                 [facility]                                                  



                                                                      



                                                                      

 

       Allergy        Problem                      2019               [facility]

rajeev



       status to                                    14:14:02               l



       narcotic   Allergy                                                  [place]

nn



       agent  status to                                                  



       status narcotic                                                  



              agent                                                   



              status                                                  



                                                                      



                                                                      



                                                                      



                                                                      



                                                                      



                                                                      



                                                                      



                                                                      



                                                                      



              2019                                                  



                                                                      



                                                                      



                                                                      



                                                                      



                 [facility]                                                  



                                                                      



                                                                      

 

       Asthma        Problem Resolve               2019               [facility]

rajeev



       (disorder)               d                    01:05:55               l



                Asthma                                                  [place]



              (disorder)                                                  



                                                                      



                                                                      



               Resolved                                                  



                                                                      



                                                                      



                                                                      



                                                                      



                Problem                                                  



                                                                      



                                                                      



              2019                                                  



                                                                      



                                                                      



                                                                      



                                                                      



                 [facility]                                                  



              Neuro,[facility]                                                  



                                                                      



                                                                      

 

       Bronchitis        Problem Resolve               2019               

[facility]



       (disorder)               d                    01:05:55               l



                                                                      Jose



              Bronchitis                                                  



              (disorder)                                                  



                                                                      



                                                                      



               Resolved                                                  



                                                                      



                                                                      



                                                                      



                                                                      



                Problem                                                  



                                                                      



                                                                      



              2019                                                  



                                                                      



                                                                      



                                                                      



                                                                      



                 [facility]                                                  



                                                                      



                                                                      

 

       Cerebral        Problem Resolve               2019               Me

moria



       palsy                d                    01:05:55               l



       (disorder)   Cerebral                                                  He

rmann



              palsy                                                   



              (disorder)                                                  



                                                                      



                                                                      



               Resolved                                                  



                                                                      



                                                                      



                                                                      



                                                                      



                Problem                                                  



                                                                      



                                                                      



              2019                                                  



                                                                      



                                                                      



                                                                      



                                                                      



                 [facility]                                                  



                                                                      



                                                                      

 

       Hydrocepha        Problem Resolve               2019               

[facility]



       ozzy                  d                    01:05:55               l



       (disorder)                                                         Miguel

n



              Hydrocepha                                                  



              ozzy                                                     



              (disorder)                                                  



                                                                      



                                                                      



               Resolved                                                  



                                                                      



                                                                      



                                                                      



                                                                      



                Problem                                                  



                                                                      



                                                                      



              2019                                                  



                                                                      



                                                                      



                                                                      



                                                                      



                 [facility]                                                  



                                                                      



                                                                      

 

       Osteomyeli        Problem Resolve               2019               

[facility]



       tis                  d                    01:05:55               l



       (disorder)                                                         Miguel

n



              Osteomyeli                                                  



              tis                                                     



              (disorder)                                                  



                                                                      



                                                                      



               Resolved                                                  



                                                                      



                                                                      



                                                                      



                                                                      



                Problem                                                  



                                                                      



                                                                      



              2019                                                  



                                                                      



                                                                      



                                                                      



                                                                      



                 [facility]                                                  



                                                                      



                                                                      

 

       Acute pain        Problem Active               2019               M

[facility]oria



       (finding)                                    01:05:55               l



                Acute                                                  [place]



              pain                                                    



              (finding)                                                  



                                                                      



                                                                      



              Active                                                  



                                                                      



                                                                      



                                                                      



                                                                      



              Problem                                                  



                                                                      



                                                                      



              2019                                                  



                                                                      



                                                                      



                                                                      



                                                                      



                 [facility]                                                  



                                                                      



                                                                      

 

       Headache        Problem Active               2019               Mem

oria



       (finding)                                    01:05:55               l



                Headache                                                  Miguel

n



              (finding)                                                  



                                                                      



                                                                      



              Active                                                  



                                                                      



                                                                      



                                                                      



                                                                      



              Problem                                                  



                                                                      



                                                                      



              2019                                                  



                                                                      



                                                                      



                                                                      



                                                                      



                 [facility]                                                  



                                                                      



                                                                      







History of Past Illness







       Condition Condition Condition Status Onset  Resolution Last   Treating Co

mments 

Source



       Name   Details Category        Date   Date   Treatment Clinician        



                                                 Date                 

 

       Headache        Problem        2019              

 Memoria



                                      14:14:02 14:14:02               l



                Headache               06:00:                             Miguel

n



                                   00                                 



                                                                      



                                                                      



                                                                      



                                                                      



              2018                                                  



                                                                      



                                                                      



                                                                      



                                                                      



                 [facility]                                                  



                                                                      



                                                                      







Allergies, Adverse Reactions, Alerts







       Allergy Allergy Status Severity Reaction(s) Onset  Inactive Treating Comm

ents 

Source



       Name   Type                        Date   Date   Clinician        

 

       Amoxicil Propensi Active        Hives                        [facility]

s



       bryn    ty to                                               ity of



              adverse                      00:00:                      [place]



              reaction                      00                          Medical



              s                                                       Branch

 

       AMOXICIL DRUG   Active        Hives                        [facility]



       BRYN    INGREDI                                              ity of



                                          00:00:                      [place]



                                          00                          Medical



                                                                      Branch

 

       Metoclop Propensi Active        Nausea 2017                      [facility]

s



       ramide ty to                and/or 2-20                        ity of



       Hcl    adverse               Vomiting 00:00:                      [place]



              reaction                      00                          Medical



              s                                                       Branch

 

       METOCLOP DRUG   Active        N/V    2017                      [facility]



       RAMIDE INGREDI                      2-20                        ity of



       HCL                                00:00:                      [place]



                                          00                          Medical



                                                                      Branch

 

       Sulfamet Propensi Active        Hives  2017-                      [facility]



       hoxazole ty to                                               [facility]



       [facility] adverse                      00:00:                      [facility]



       oprim  reaction                      00                          Center



              s                                                       

 

       Levoflox Propensi Active        Hives  -                      [facility]



       acin   ty to                       6                        [facility]



              adverse                      00:00:                      [facility]



              reaction                      00                          Center



              s                                                       

 

       SULFAMET Allergy Active High   Hives  -                      [facility]



       HOXAZOLE                             6-11                        [facility]



       -[facility]                             00:00:                      Medical



       OPRIM                              00                          Center

 

       LEVOFLOX Allergy Active High   Hives  2017                      [facility]



       ACIN                               6                        [facility]



                                          00[facility]00[facility]                      [facility]



                                          00                          Center

 

       MORPHINE Allergy Active High   Hives  2017-                      [facility]



                                          6-11                        [facility]



                                          00[facility]                      [facility]



                                          00                          Center

 

       KETOROLA Allergy Active High   Rash   -                      [facility]



       C                                  -                        [facility]



                                          00:00:                      Medical



                                          00                          Center

 

       VANCOMYC Allergy Active High   Rash                         [facility]



       IN                                                         [facility]



       [facility]                             00:00:                      Medical



       S                                  00                          Center

 

       SESAME Allergy Active        Hives  -                      [facility]



       SEED                                                       [facility]



                                          00[facility]:                      [facility]



                                          00                          Center

 

       Morphine Propensi Active        Hives                        [facility]



              ty to                                               [facility]



              adverse                      00:00:                      Medical



              reaction                      00                          Center



              s                                                       

 

       ONDANSET Allergy Active        N\T\V  -                      [facility]



       EVIN HCL                                                     [facility]



       (PF)                               00:00:                      Medical



                                          00                          Center

 

       Sesame Propensi Active        Hives  -                      [facility]



       Seed   ty to                                               [facility]



              adverse                      00:00:                      Medical



              reaction                      00                          Center



              s                                                       

 

       Ketorola Propensi Active        Rash   -                      [facility]



       c      ty to                                               [facility]



              adverse                      00:00:                      Medical



              reaction                      00                          Center



              s                                                       

 

       Vancomyc Propensi Active        Rash   2017-                      [facility]



       in     ty to                       6                        [facility]



       Analogue adverse                      00:00:                      Medical



       s      reaction                      00                          Center



              s                                                       

 

       Ondanset Propensi Active        Nausea And 2017-0                      [facility]

[facility]



       evin Hcl ty to                Vomiting                         [facility]



       (Pf)   adverse                      00:00:                      Medical



              reaction                      00                          Center



              s                                                       

 

       Sulfa  Propensi Active        Other - See                       Uni

vers



       (Sulfona ty to                comments                         ity of



       mide   adverse                      00:00:                      [place]



       Antibiot reaction                      00                          Medica

l



       ics)   s                                                       Branch

 

       Sulfamet Propensi Active        Other - See                       U

nivers



       hoprim ty to                comments 1-04                        ity of



       Ds     adverse                      00:00:                      [place]



              reaction                      00                          Medical



              s                                                       Branch

 

       Vancomyc Propensi Active        Other - See                       U

nivers



       in     ty to                comments 1-04                        ity of



              adverse                      00:00:                      [place]



              reaction                      00                          Medical



              s                                                       Branch

 

       SULFA  Drug   Active        Other-Cmnt 0                      Univer

s



       (SULFONA Class                       1-04                        ity of



       MIDE                               00:00:                      [place]



       ANTIBIOT                             00                          Medical



       ICS)                                                           Branch

 

       SULFAMET DRUG   Active        Other-Cmnt                       Univ

ers



       HOPRIM                             1                        ity of



       DS                                 00:00:                      [place]



                                          00                          Medical



                                                                      Branch

 

       VANCOMYC DRUG   Active        Other-Cmnt                       Univ

ers



       IN     INGREDI                                              ity of



                                          00:00:                      [place]



                                          00                          Medical



                                                                      Branch

 

       Sulfamet Propensi Active        Rash   0                      Univer

s



       hoxazole ty to                       7-                        ity of



              adverse                      00:00:                      [place]



              reaction                      00                          Medical



              s                                                       Branch

 

       Ondanset Propensi Active        Nausea 0                      Univer

s



       evin Hcl ty to                and/or                         ity of



       (Pf)   adverse               Vomiting 00:00:                      [place]



              reaction                                                Medical



              s                                                       Branch

 

       SULFAMET DRUG   Active        Rash                         Univers



       HOXAZOLE INGREDI                      7-                        ity of



                                          00:00:                      [place]



                                          00                          Medical



                                                                      Branch

 

       ONDANSET DRUG   Active        N/V    0                      Univers



       EVIN HCL                             7-19                        ity of



       (PF)                               00:00:                      [place]



                                          00                          Medical



                                                                      Branch

 

       Levoflox Propensi Active        Hives  0                      Univer

s



       acin   ty to                       5-23                        ity of



              adverse                      00:00:                      [place]



              reaction                      00                          Medical



              s                                                       Branch

 

       Morphine Propensi Active        Hives  0                      Univer

s



              ty to                       5-23                        ity of



              adverse                      00:00:                      [place]



              reaction                      00                          Medical



              s                                                       Branch

 

       Ketorola Propensi Active        Nausea 0                      Univer

s



       c      ty to                and/or 5-                        ity of



       Trometha adverse               Vomiting 00:00:                      [place]



       mine   reaction                      00                          Medical



              s                                                       Branch

 

       LEVOFLOX DRUG   Active        Hives  0                      Univers



       ACIN   INGREDI                      5-23                        ity of



                                          00:00:                      [place]



                                          00                          Medical



                                                                      Branch

 

       MORPHINE DRUG   Active        Hives  0                      Univers



              INGREDI                      5-23                        ity of



                                          00:00:                      [place]



                                          00                          Medical



                                                                      Branch

 

       KETOROLA DRUG   Active        N/V    0                      Univers



       C      INGREDI                      5-23                        ity of



       TROMETHA                             00:00:                      [place]



       MINE                               00                          Medical



                                                                      Branch

 

       Morphine Adverse Active        Info Not                             [facility]

t



       Sulfate Reaction               Available                             [facility]

[facility] -



       ER                                                             [facility]



                                                                      l



                                                                      Outpati



                                                                      ent



                                                                      Clinics

 

       Levaquin Adverse Active        Info Not                             [facility]

[facility]



              Reaction               Available                             [facility]



                                                                      [facility]



                                                                      [facility]

 

       Zofran Adverse Active        Info Not                             [facility]



              Reaction               Available                             [facility]

 -



                                                                      [facility]



                                                                      l



                                                                      Outpati



                                                                      ent



                                                                      Clinics

 

       Minocin Minocin Active                                           [facility]



                                                                      l



                                                                      [place]

 

       Zofran Zofran Active                                           [facility]



                                                                      l



                                                                      [place]

 

       Levaquin Levaquin Active                                           [facility]

a



                                                                      l



                                                                      [place]

 

       Bactrim Bactrim Active                                           [facility]



                                                                      l



                                                                      [place]

 

       amoxicil amoxicil Active                                           [facility]

a



       bryn    bryn                                                     l



                                                                      [place]

 

       morphine morphine Active                                           [facility]

a



                                                                      l



                                                                      [place]

 

       Toradol Toradol Active                                           [facility]



                                                                      l



                                                                      [place]







Social History







           Social Habit Start Date Stop Date  Quantity   Comments   Source

 

           History of tobacco                       Cigarette Smoker            

[facility] -



           use                                                    [facility]

 

           Exposure to                       Not sure              [facility] 



           SARS-CoV-2 (event)                                             [facility]

 

           Alcohol intake 2022 0 /d                  [facility]



                      00:00:00   00:00:00                         [facility]

 

           Cigarettes smoked 2017                       [facility] -



           current (pack per 00:00:00   00:00:00                         Medical

 Center



           day) - Reported                                             

 

           Tobacco use and 2017 Never used            [facility]

y of



           exposure   00:00:00   00:00:00                         [facility]

 

           [facility] 1985                       [facility]

[facility]



           Birth      00:00:00   00:00:00                         [facility]









                Smoking Status  Start Date      Stop Date       Source

 

                Current every day smoker 2017 00:00:00                 Uni

versity of [facility]







Medications







       Ordered Filled Start  Stop   Current Ordering Indication Dosage Frequency

 Signature

                    Comments            Components          Source



     Medication Medication Date Date Medication? Clinician                (SIG) 

          



     Name Name                                                   

 

     HYDROmorpho      2022- No             .5mg      0.5 mg,           Un

y[place]elyn



     ne                                  Slow IV           ity of



     (DILAUDID)      01:30: 01:25                          Push,           [place]



     injection      00   :00                           ONCE, 1           Medical



     0.5 mg                                         dose, On           Branch



                                                  22 at           



                                                  1930,           



                                                  Routine<br           



                                                  >Use           



                                                  approved           



                                                  by             



                                                  (Faculty):           



                                                  ADC            



                                                  PROVIDER           

 

     levoFLOXaci      2022- Yes       [id number] 750mg      Take 1        

   [facility]



     n 750 mg                                tablet by           ity o

f



     tablet      00:00: 05:59                          mouth           [place]



               00   :00                           daily for           Medical



                                                  4 days.           Branch

 

     levoFLOXaci      2022- Yes       [id number] 750mg      Take 1        

   [facility]



     n 750 mg                                tablet by           ity o

f



     tablet      00:00: 05:59                          mouth           [place]



               00   :00                           daily for           Medical



                                                  4 days.           Branch

 

     OXYCODONE            Yes                      Take  by           [facility]

ers



     HCL/ACETAMI      1-25                               mouth.           ity of



     NOPHEN      19:49:                                              [place]



     (PERCOCET      27                                                Medical



     ORAL)                                                        Branch

 

     OXYCODONE            Yes                      Take  by           [facility]

ers



     HCL/ACETAMI      25                               mouth.           ity of



     NOPHEN      19:49:                                              [place]



     (PERCOCET      27                                                Medical



     ORAL)                                                        [place]

 

     vancomycin      2022- Yes            125mg      125 mg,           Un

y[facility]elyn



     (VANCOCIN)                                Oral, QID,           it

y of



     capsule 125      18:00: 21:59                          25 doses,           

[place]



     mg        00   :00                           First dose           Medical



                                                  (after           Branch



                                                  last           



                                                  modificati           



                                                  on) on 22 at           



                                                  1200, Last           



                                                  dose on           



                                                  22 at           



                                                  1200,           



                                                  ASAP<br>Fa           



                                                  culty           



                                                  member           



                                                  approving           



                                                  Non-formul           



                                                  maryanne            



                                                  medication           



                                                  :              



                                                  MEGADC<br&           



                                                  gt;Reason           



                                                  for            



                                                  non-formul           



                                                  maryanne use:           



                                                  SPECIFIC           



                                                  INDICATION           



                                                  FOR            



                                                  NONFORMULA           



                                                  RY             



                                                  PRODUCT<br           



                                                  >Reason           



                                                  for            



                                                  Anti-Infec           



                                                  tive:           



                                                  Documented           



                                                  Infection<           



                                                  br>Documen           



                                                  huma            



                                                  Infection           



                                                  Site:           



                                                  Abdominal<           



                                                  br>Duratio           



                                                  n of           



                                                  Therapy:           



                                                  14 days           

 

     levoFLOXaci            Yes            750mg      750 mg,           Un

[facility]



     n                                        Oral,           ity of



     (LEVAQUIN)      15:00:                               DAILY,           [place]



     tablet 750      00                                 First dose           Med

ical



     mg                                           (after           Branch



                                                  last           



                                                  modificati           



                                                  on) on 22 at           



                                                  0900,           



                                                  Until           



                                                  Discontinu           



                                                  ed,            



                                                  ASAP<br>Re           



                                                  ason for           



                                                  Anti-Infec           



                                                  tive:           



                                                  Empiric           



                                                  Therapy           



                                                  for            



                                                  Suspected           



                                                  Infection<           



                                                  br>Empiric           



                                                  Therapy           



                                                  Site:           



                                                  Urine<br>D           



                                                  uration of           



                                                  therapy:           



                                                  72 hours           

 

     lactobacill      2022- Yes       [id number] .5mg      Take 1         

  [facility]



     us                                  tablet by           ity of



     acidophilus      00:00: 05:59                          mouth 2           [place]

[place]



               00   :00                           (two)           Medical



                                                  times           [place]



                                                  daily for           



                                                  30 days.           

 

     lactobacill      2022- Yes       [id number] .5mg      Take 1         

  [facility]



     us                                  tablet by           ity of



     acidophilus      00:00: 05:59                          mouth 2           [facility]

[place]



               00   :00                           (two)           Medical



                                                  times           Branch



                                                  daily for           



                                                  30 days.           

 

     vancomycin      2022- Yes       [id number] 125mg      Take 1         

  [facility]



     125 mg                                capsule by           ity of



     capsule      00:00: 05:59                          mouth 4           [place]



               00   :00                           (four)           Medical



                                                  times           Branch



                                                  daily for           



                                                  5 days.           

 

     vancomycin      2022- Yes       [id number] 125mg      Take 1         

  [facility]



     125 mg                                capsule by           ity of



     capsule      00:00: 05:59                          mouth 4           [place]



               00   :00                           (four)           Medical



                                                  times           [facility]



                                                  daily for           



                                                  5 days.           

 

     traMADoL            Yes            50mg      50 mg,           [facility]



     (ULTRAM)                                     Oral,           ity of



     tablet 50      21:26:                               Q6HPRN,           [place]



     mg        10                                 Starting           Medical



                                                  on Mon           Branch



                                                  22 at           



                                                  1526,           



                                                  Until           



                                                  Discontinu           



                                                  ed,            



                                                  Routine,           



                                                  Pain           



                                                  (scale           



                                                  4-6)           

 

     levoFLOXaci      2022- No             250mg      250 mg,           U

nivers



     n                                   Oral, Q24H           ity of



     (LEVAQUIN)      19:30: 23:18                          ABX, First           

[place]



     tablet 250      00   :17                           dose           Medical



     mg                                           (after           [facility]anch



                                                  last           



                                                  modificati           



                                                  on) on 22 at           



                                                  1330,           



                                                  Until           



                                                  Discontinu           



                                                  ed,            



                                                  ASAP<br>Re           



                                                  ason for           



                                                  Anti-Infec           



                                                  tive:           



                                                  Empiric           



                                                  Therapy           



                                                  for            



                                                  Suspected           



                                                  Infection<           



                                                  br>Empiric           



                                                  Therapy           



                                                  Site:           



                                                  Urine<br>D           



                                                  uration of           



                                                  therapy:           



                                                  72 hours           

 

     HYDROmorpho      2022- No             .5mg      0.5 mg,           Un

[facility]



     ne                                  Slow IV           ity of



     (DILAUDID)      20:45: 18:23                          Push,           [place]



     injection      00   :44                           Q6HPRN,           Medical



     0.5 mg                                         Starting           Branch



                                                  on Sun           



                                                  1/23/22 at           



                                                  1445,           



                                                  Until 22 at           



                                                  1223,           



                                                  Routine,           



                                                  Pain           



                                                  (scale           



                                                  7-10),           



                                                  breakthrou           



                                                  gh             



                                                  pain<br>Us           



                                                  e approved           



                                                  by             



                                                  (Faculty):           



                                                  ADC            



                                                  PROVIDER           

 

     oxyCODONE-a            Yes            2{tbl}      2 tablet,          

 [facility]



     cetaminophe                                     Oral,           ity of



     n         20:39:                               Q6HPRN,           [place]



     (PERCOCET)      03                                 Starting           Medic

al



     5-325 mg                                         on Sun           Br[facility]



     per tablet                                         22 at           



     2 tablet                                         1439,           



                                                  Until           



                                                  Discontinu           



                                                  ed,            



                                                  Routine,           



                                                  Pain           



                                                  (scale           



                                                  7-10)           

 

     HYDROmorpho      2022- No             .5mg      0.5 mg,           Un

[facility]



     ne                                  Slow IV           ity of



     (DILAUDID)      00:00: 23:41                          Push,           [place]



     injection      00   :00                           ONCE, 1           Medical



     0.5 mg                                         dose, On           [facility]anch



                                                  Sat            



                                                  22 at           



                                                  1800,           



                                                  Routine<br           



                                                  >Use           



                                                  approved           



                                                  by             



                                                  (Faculty):           



                                                  ADC            



                                                  PROVIDER           

 

     ertapenem      2022- No             1000mg      1,000 mg,           

[facility]



     (INVANZ)                                IV             ity of



     1,000 mg in      15:45: 18:30                          Piggyback,          

 [place]



     NaCl 0.9%      00   :00                           Q24H ABX,           Medic

al



     (NS) 50 mL                                         First dose           Bra

[facility]



     MINI-BAG                                         on Sat           



                                                  22 at           



                                                  0945,           



                                                  Until           



                                                  Discontinu           



                                                  ed,            



                                                  Administer           



                                                  over 30           



                                                  Minutes,           



                                                  50             



                                                  mL<br>Reas           



                                                  on for           



                                                  Anti-Infec           



                                                  tive:           



                                                  Empiric           



                                                  Therapy           



                                                  for            



                                                  Suspected           



                                                  Infection<           



                                                  br>Empiric           



                                                  Therapy           



                                                  Site:           



                                                  Urine<br>D           



                                                  uration of           



                                                  therapy:           



                                                  72             



                                                  hours<br>R           



                                                  estricted           



                                                  use            



                                                  approved           



                                                  by: ADC           



                                                  PROVIDER           

 

     lactobacill            Yes            .5mg      0.5 mg,           Uni

vers



                                            Oral, BID,           ity of



     acidophilus      02:00:                               First dose           

[place]



     tablet 0.5      00                                 on Fri           Medical



     mg                                           22 at           Branch



                                                  ,           



                                                  Until           



                                                  Discontinu           



                                                  ed,            



                                                  Routine           

 

     vancomycin      2022- No             250mg      250 mg,           Un

[facility]



     (VANCOCIN)                                Oral, QID,           it

y of



     capsule 250      02:00: 16:20                          First dose          

 [place]



     mg        00   :40                           (after           Medical



                                                  last           Branch



                                                  reorder)           



                                                  on 22 at           



                                                  ,           



                                                  Until           



                                                  Discontinu           



                                                  ed,            



                                                  ASAP&lt;br           



                                                  >Faculty           



                                                  member           



                                                  approving           



                                                  Non-formul           



                                                  maryanne            



                                                  medication           



                                                  :              



                                                  MEGADC<br>           



                                                  Reason for           



                                                  non-formul           



                                                  maryanne use:           



                                                  SPECIFIC           



                                                  INDICATION           



                                                  FOR            



                                                  NONFORMULA           



                                                  RY             



                                                  PRODUCT<br           



                                                  >Reason           



                                                  for            



                                                  Anti-Infec           



                                                  tive:           



                                                  Documented           



                                                  Infection<           



                                                  br>Documen           



                                                  huma            



                                                  Infection           



                                                  Site:           



                                                  Abdominal<           



                                                  br>Duratio           



                                                  n of           



                                                  Therapy:           



                                                  14 days           

 

     vancomycin       No             250mg      250 mg,           Un

[facility]



     (VANCOCIN)                                Oral,           ity of



     capsule 250      00:45: 00:34                          ONCE, 1           [place]

[place]



     mg        00   :00                           dose, On           Medical



                                                  Fri            Branch



                                                  22 at           



                                                  1845,           



                                                  ASAP<br>Fa           



                                                  culty           



                                                  member           



                                                  approving           



                                                  Non-formul           



                                                  maryanne            



                                                  medication           



                                                  :              



                                                  MEGADC<br>           



                                                  Reason for           



                                                  non-formul           



                                                  maryanne use:           



                                                  SPECIFIC           



                                                  INDICATION           



                                                  FOR            



                                                  NONFORMULA           



                                                  RY             



                                                  PRODUCT<br           



                                                  >Reason           



                                                  for            



                                                  Anti-Infec           



                                                  tive:           



                                                  Documented           



                                                  Infection<           



                                                  br>Documen           



                                                  huma            



                                                  Infection           



                                                  Site:           



                                                  Abdominal<           



                                                  br>Duratio           



                                                  n of           



                                                  Therapy:           



                                                  14 days           

 

     lidocaine      2022- No             5mL       5 mL,           Univer

s



     1% (PF)                                Subcutaneo           ity o

f



     (XYLOCAINE)      23:45: 02:25                          us, ONCE,           

[place]



     injection 5      00   :00                           1 dose, On           Me

dical



     mL                                           Fri            Branch



                                                  22 at           



                                                  1745,           



                                                  Routine           

 

     NaCl 0.9%            Yes            10mL      10 mL,           Univer

s



     (NS)                                     Slow IV           ity of



     injection      23:38:                               Push, PRN,           [facility]

[place]



     10 mL      41                                 Starting           Medical



                                                  on Fri           Branch



                                                  22 at           



                                                  1738,           



                                                  Until           



                                                  Discontinu           



                                                  ed,            



                                                  Routine,           



                                                  line           



                                                  maintenanc           



                                                  e              

 

     meropenem       No             1g        1 g, IV           Univ

ers



     (MERREM) 1                                Piggyback,           it

y of



     g in NaCl      23:15: 14:33                          Q8H ABX,           Eric

as



     0.9% (NS)      00   :35                           First dose           Medi

[facility]



     100 mL                                         (after           Branch



     MINI-BAG                                         last           



                                                  modificati           



                                                  on) on Thu           



                                                  22 at           



                                                  1715,           



                                                  Until           



                                                  Discontinu           



                                                  ed,            



                                                  Administer           



                                                  over 60           



                                                  Minutes,           



                                                  100            



                                                  mL<br>Rest           



                                                  ricted use           



                                                  approved           



                                                  by: ADC           



                                                  PROVIDER<b           



                                                  r&gt;Reaso           



                                                  n for           



                                                  Anti-Infec           



                                                  tive:           



                                                  Documented           



                                                  Infection<           



                                                  br>Documen           



                                                  huma            



                                                  Infection           



                                                  Site:           



                                                  Urine<br>D           



                                                  uration of           



                                                  Therapy: 7           



                                                  days           

 

     enoxaparin            Yes            40mg      40 mg,           Unive

rs



     (LOVENOX)                                     Subcutaneo           ity 

of



     injection      23:00:                               us, DAILY,           [facility]

[place]



     40 mg      00                                 First dose           Medical



                                                  on u           Branch



                                                  22 at           



                                                  1700,           



                                                  Until           



                                                  Discontinu           



                                                  ed,            



                                                  Routine           

 

     meropenem      2022- No             1g        1 g, IV           Univ

ers



     (MERREM)                           Piggyback,           it

y of



     g in NaCl      16:00: 20:33                          Q8H ABX,           Eric

as



     0.9% (NS)      00   :04                           First dose           Medi

[facility]



     100 mL                                         (after           Branch



     MINI-BAG                                         last           



                                                  reorder)           



                                                  on u           



                                                  22 at           



                                                  1000,           



                                                  Until           



                                                  Discontinu           



                                                  ed,            



                                                  Administer           



                                                  over 60           



                                                  Minutes,           



                                                  100            



                                                  mL<br>Rest           



                                                  ricted use           



                                                  approved           



                                                  by: ADC           



                                                  PROVIDER<b           



                                                  r>Reason           



                                                  for            



                                                  Anti-Infec           



                                                  tive:           



                                                  Documented           



                                                  Infection<           



                                                  br>Documen           



                                                  huma            



                                                  Infection           



                                                  Site:           



                                                  Urine<br>D           



                                                  uration of           



                                                  Therapy:           



                                                  Other (see           



                                                  Comments)           

 

     HYDROmorpho      2022- No             .5mg      0.5 mg,           Un

[facility]



     ne                                  Slow IV           ity of



     (DILAUDID)      14:29: 20:41                          Push,           [place]



     injection      58   :17                           Q4HPRN,           Medical



     0.5 mg                                         Starting           [facility]



                                                  on u           



                                                  22 at           



                                                  0829,           



                                                  Until Sun           



                                                  22 at           



                                                  1441,           



                                                  Routine,           



                                                  Pain           



                                                  (scale           



                                                  7-10)<br>U           



                                                  se             



                                                  approved           



                                                  by             



                                                  (Faculty):           



                                                  ADC            



                                                  PROVIDER           

 

     proMETHazin            Yes            25mg      25 mg,           Univ

ers



     e                                        Oral,           ity of



     (PHENERGAN)      09:42:                               Q4HPRN,           [facility]

as



     tablet 25      07                                 Starting           [facility]

l



     mg                                           on u           [facility]



                                                  22 at           



                                                  0342,           



                                                  Until           



                                                  Discontinu           



                                                  ed,            



                                                  Routine,           



                                                  Nausea and           



                                                  Vomiting           



                                                  (N/V)           

 

     HYDROmorpho      2022- No             .5mg      0.5 mg,           Un

[facility]



     ne                                  Slow IV           ity of



     (DILAUDID)      09:41: 12:04                          Push,           [place]



     injection      36   :38                           Q4HPRN,           Medical



     0.5 mg                                         Starting           [facility]



                                                  on u           



                                                  22 at           



                                                  0341,           



                                                  Until u           



                                                  22 at           



                                                  0604,           



                                                  Routine,           



                                                  Pain           



                                                  (scale           



                                                  7-10)<br>U           



                                                  se             



                                                  approved           



                                                  by             



                                                  (Faculty):           



                                                  ADC            



                                                  PROVIDER           

 

     proCHLORper      2022-0      Yes            10mg      10 mg,           Univ

ers



     azine                                     Slow IV           ity of



     (COMPAZINE)      09:07:                               Push,           [place]



     injection      27                                 Q6HPRN,           Medical



     10 mg                                         Starting           [facility]



                                                  on u           



                                                  22 at           



                                                  0307,           



                                                  Until           



                                                  Discontinu           



                                                  ed,            



                                                  Routine,           



                                                  Nausea and           



                                                  Vomiting           



                                                  (N/V)           

 

     acetaminoph            Yes            650mg      650 mg,           Un

[facility]



     en                                       Oral,           ity of



     (TYLENOL)      09:07:                               Q6HPRN,           [place]



     tablet 650      10                                 Starting           Medic

al



     mg                                           on u           [facility]



                                                  22 at           



                                                  0307,           



                                                  Until           



                                                  Discontinu           



                                                  ed,            



                                                  Routine,           



                                                  Pain           



                                                  (scale           



                                                  1-3)           

 

     meropenem      2022- No             1g        1 g, IV           Univ

ers



     (MERREM) 1                                Piggyback,           it

y of



     g in NaCl      07:15: 08:49                          ONCE, 1           [place]

[place]



     0.9% (NS)      00   :00                           dose, On           Medica

l



     100 mL                                         [facility]



     MINI-BAG                                         22 at           



                                                  0115,           



                                                  Administer           



                                                  over 60           



                                                  Minutes,           



                                                  100            



                                                  mL<br>Rest           



                                                  ricted use           



                                                  approved           



                                                  by: ADC           



                                                  PROVIDER<b           



                                                  r>Reason           



                                                  for            



                                                  Anti-Infec           



                                                  tive:           



                                                  Documented           



                                                  Infection<           



                                                  br>Documen           



                                                  huma            



                                                  Infection           



                                                  Site:           



                                                  Urine<br>D           



                                                  uration of           



                                                  Therapy:           



                                                  Other (see           



                                                  Comments)           

 

     cefdinir      2022- No             300mg      300 mg,           Univ

ers



     (OMNICEF)                                Oral,           ity of



     capsule 300      03:30: 02:55                          ONCE, 1           [facility]

[place]



     mg        00   :00                           dose, On           Medical



                                                  Mon            [facility]



                                                  22 at           



                                                  2130,           



                                                  ASAP<br>Re           



                                                  ason for           



                                                  Anti-Infec           



                                                  tive:           



                                                  Documented           



                                                  Infection<           



                                                  br>Documen           



                                                  huma            



                                                  Infection           



                                                  Site:           



                                                  Urine<br>D           



                                                  uration of           



                                                  Therapy: 7           



                                                  days           

 

     FENTanyl PF      2022- No             50ug      50 mcg,           Un

[facility]



     (SUBLIMAZE                                Slow IV           ity o

f



     (PF))      01:15: 03:26                          Push,           [place]



     injection      00   :00                           ONCE, 1           Medical



     50 mcg                                         dose, On           [facility]



                                                  Mon            



                                                  22 at           



                                                  1915, STAT           

 

     proMETHazin      2022- No             25mg      25 mg, IV           

[facility]



     e                                   Piggyback,           ity of



     (PHENERGAN)      01:15: 03:26                          ONCE, 1           Te

[facility]



     25 mg in      00   :00                           dose, On           Medical



     NaCl 0.9%                                         Mon            [facility]



     (NS) 50 mL                                         22 at           



     piggyback                                         1915, 50           



                                                  mL             

 

     cefdinir      2022- Yes       [id number] 300mg      Take 1           U

nivers



     300 mg                                capsule by           ity of



     capsule      00:00: 05:59                          mouth           [place]



               00   :00                           every 12           Medical



                                                  (twelve)           [facility]



                                                  hours for           



                                                  10 days.           

 

     cefdinir      2022- No        [id number] 300mg      Take 1           U

nivers



     300 mg                                capsule by           ity of



     capsule      00:00: 00:00                          mouth           [place]



               00   :00                           every 12           Medical



                                                  (twelve)           [facility]



                                                  hours for           



                                                  10 days.           

 

     FENTanyl PF      2022- No             50ug      50 mcg,           Un

[facility]



     (SUBLIMAZE                                Slow IV           ity o

f



     (PF))      02:00: 01:19                          Push,           [place]



     injection      00   :00                           ONCE, 1           Medical



     50 mcg                                         dose, On           [facility]



                                                  Sat            



                                                  1/15/22 at           



                                                  ,           



                                                  Routine           

 

     methocarbam      2022- No             1000mg      1,000 mg,         

  [facility]



     oL                                  Oral,           ity of



     (ROBAXIN)      02:00: 01:19                          ONCE, 1           [place]

[place]



     tablet      00   :00                           dose, On           Medical



     1,000 mg                                         Sat            Branch



                                                  1/15/22 at           



                                                  2000, ASAP           

 

     proMETHazin      2022- No             12.5mg      12.5 mg,          

 [facility]



     e         1-15 01-15                          IV             ity of



     (PHENERGAN)      22:46: 23:00                          Piggyback,          

 [place]



     12.5 mg in      00   :00                           ONCE, 1           Medica

l



     NaCl 0.9%                                         dose, On           Branch



     (NS) 50 mL                                         Sat            



     piggyback                                         1/15/22 at           



                                                  1700, 50           



                                                  mL             

 

     FENTanyl PF      2022- No             50ug      50 mcg,           Un

[facility]



     (SUBLIMAZE      1-15 01-15                          Slow IV           ity o

f



     (PF))      22:45: 23:00                          Push,           [place]



     injection      00   :00                           ONCE, 1           Medical



     50 mcg                                         dose, On           [facility]



                                                  Sat            



                                                  1/15/22 at           



                                                  1645,           



                                                  Routine           

 

     methocarbam            Yes       [id number] 1000mg      Take 2         

  [facility]



     oL 500 mg      1-15                               tablets by           ity 

of



     tablet      00:00:                               mouth 4           [place]



               00                                 (four)           Medical



                                                  times           [facility]



                                                  daily as           



                                                  needed for           



                                                  Pain           



                                                  (scale           



                                                  1-3).           

 

     methocarbam      -0      Yes       [id number] 1000mg      Take 2         

  [facility]



     oL 500 mg      1-15                               tablets by           ity 

of



     tablet      00:00:                               mouth 4           [place]



               00                                 (four)           Medical



                                                  times           [facility]



                                                  daily as           



                                                  needed for           



                                                  Pain           



                                                  (scale           



                                                  1-3).           

 

     methocarbam      -0      Yes       [id number] 1000mg      Take 2         

  [facility]



     oL 500 mg      1-15                               tablets by           ity 

of



     tablet      00:00:                               mouth 4           [place]



               00                                 (four)           Medical



                                                  times           [facility]



                                                  daily as           



                                                  needed for           



                                                  Pain           



                                                  (scale           



                                                  1-3).           

 

     methocarbam      -0      Yes       [id number] 1000mg      Take 2         

  [facility]



     oL 500 mg      1-15                               tablets by           ity 

of



     tablet      00:00:                               mouth 4           [place]



               00                                 (four)           Medical



                                                  times           Branch



                                                  daily as           



                                                  needed for           



                                                  Pain           



                                                  (scale           



                                                  1-3).           

 

     proMETHazin      2021 No             25mg      25 mg, IV           

[facility]



     e                                   Piggyback,           ity of



     (PHENERGAN)      23:15: 22:20                          ONCE, 1           Te

[facility]



     25 mg in      00   :00                           dose, On           Medical



     NaCl 0.9%                                         Wed            [facility]anch



     (NS) 50 mL                                         21           



     piggyback                                         at 1715,           



                                                  50 mL           

 

     FENTanyl PF      2021 No             75ug      75 mcg,           Un

[facility]



     (SUBLIMAZE                                Slow IV           ity o

f



     (PF))      22:15: 22:20                          Push,           [place]



     injection      00   :00                           ONCE, 1           Medical



     75 mcg                                         dose, On           [facility]



                                                  Wed            



                                                  21           



                                                  at 1615,           



                                                  Routine           

 

     fidaxomicin      2021      Yes       [id number] 200mg      Take 1          

 [facility]



     200 mg      1-24                               tablet by           ity of



     tablet      00:00:                               mouth 2           [place]



               00                                 (two)           Medical



                                                  times           Branch



                                                  daily.           

 

     proMETHazin      2021      Yes       [id number] 25mg      Take 1           

[facility]



     e 25 mg      1-24                               tablet by           ity of



     tablet      00:00:                               mouth           [place]



               00                                 every 6           Medical



                                                  (six)           Branch



                                                  hours as           



                                                  needed for           



                                                  Nausea and           



                                                  Vomiting           



                                                  (N/V).           

 

     fidaxomicin      2021      Yes       [id number] 200mg      Take 1          

 [facility]



     200 mg      1-24                               tablet by           ity of



     tablet      00:00:                               mouth 2           [place]



               00                                 (two)           Medical



                                                  times           Branch



                                                  daily.           

 

     proMETHazin      2021      Yes       [id number] 25mg      Take 1           

[facility]



     e 25 mg      1-24                               tablet by           ity of



     tablet      00:00:                               mouth           [place]



               00                                 every 6           Medical



                                                  (six)           Branch



                                                  hours as           



                                                  needed for           



                                                  Nausea and           



                                                  Vomiting           



                                                  (N/V).           

 

     cholestyram      2021      Yes                                     Univer

s



     ine 4 gram      1-24                                              ity of



     packet      00:00:                                              [place]



               00                                                Medical



                                                                 Branch

 

     fidaxomicin      2021      Yes       [id number] 200mg      Take 1          

 [facility]



     200 mg      1-24                               tablet by           ity of



     tablet      00:00:                               mouth 2           [place]



               00                                 (two)           Medical



                                                  times           Branch



                                                  daily.           

 

     proMETHazin      2021      Yes       [id number] 25mg      Take 1           

[facility]



     e 25 mg      1-24                               tablet by           ity of



     tablet      00:00:                               mouth           [place]



               00                                 every 6           Medical



                                                  (six)           Branch



                                                  hours as           



                                                  needed for           



                                                  Nausea and           



                                                  Vomiting           



                                                  (N/V).           

 

     cholestyram      2021      Yes                                     Univer

s



     ine 4 gram      1-24                                              ity of



     packet      00:00:                                              [place]



               00                                                Medical



                                                                 Branch

 

     cholestyram      2021      Yes                                     Univer

s



     ine 4 gram      1-24                                              ity of



     packet      00:00:                                              [place]



               00                                                [facility]



                                                                 Branch

 

     cholestyram      2021      Yes                                     Univer

s



     ine 4 gram      1-24                                              ity of



     packet      00:00:                                              [place]



               00                                                Medical



                                                                 Branch

 

     fidaxomicin      2021      Yes       [id number] 200mg      Take 1          

 [facility]



     200 mg      1-24                               tablet by           ity of



     tablet      00:00:                               mouth 2           [place]



               00                                 (two)           Medical



                                                  times           Branch



                                                  daily.           

 

     proMETHazin      2021      Yes       [id number] 25mg      Take 1           

[facility]



     e 25 mg      1-24                               tablet by           ity of



     tablet      00:00:                               mouth           [place]



               00                                 every 6           Medical



                                                  (six)           Branch



                                                  hours as           



                                                  needed for           



                                                  Nausea and           



                                                  Vomiting           



                                                  (N/V).           

 

     fidaxomicin      2021- No        [id number] 200mg      Take 1         

  [facility]



     200 mg      1-24 01-25                          tablet by           ity of



     tablet      00:00: 00:00                          mouth 2           [place]



               00   :00                           (two)           Medical



                                                  times           Branch



                                                  daily.           

 

     proMETHazin      2021- No        [id number] 25mg      Take 1          

 [facility]



     e 25 mg      1-24 01-25                          tablet by           ity of



     tablet      00:00: 00:00                          mouth           [place]



               00   :00                           every 6           Medical



                                                  (six)           Branch



                                                  hours as           



                                                  needed for           



                                                  Nausea and           



                                                  Vomiting           



                                                  (N/V).           

 

     FENTanyl PF      2021- No             50ug      50 mcg,           Un

[facility]oselyn



     (SUBLIMAZE      5-10 05-10                          Intravenou           it

y of



     (PF))      02:45: 01:34                          s, ONCE, 1           [place]



     injection      00   :00                           dose, Sun           Medic

al



     50 mcg                                         21 at           Branch



                                                  2145,           



                                                  Routine           

 

     cefTRIAXone      2021- No             1000mg      1,000 mg,         

  [facility]



     (ROCEPHIN)      5-10 05-10                          IV             ity of



     1,000 mg in      02:30: 01:55                          Piggyback,          

 [place]



     NaCl 0.9%      00   :00                           ONCE, 1           Medical



     (NS) 50 mL                                         dose, [facility]

ch



     MINI-BAG                                         21 at           



                                                  2130, 50           



                                                  mL<br>Reas           



                                                  on for           



                                                  Anti-Infec           



                                                  tive:           



                                                  Documented           



                                                  Infection<           



                                                  br>Documen           



                                                  huma            



                                                  Infection           



                                                  Site:           



                                                  Urine<br>D           



                                                  uration of           



                                                  Therapy: 7           



                                                  days           

 

     famotidine      2021- No             20mg      20 mg,           Univ

ers



     (PEPCID      5-10 05-10                          Slow IV           ity of



     (PF))      01:00: 00:12                          Push,           [place]



     injection      00   :00                           ONCE, 1           Medical



     20 mg                                         dose, [facility]



                                                  21 at           



                                                  2000, ASAP           

 

     proMETHazin      2021- No             25mg      25 mg, IV           

Univers



     e         5-10 05-10                          Piggyback,           ity of



     (PHENERGAN)      00:45: 00:11                          ONCE, 1           Te

[place]



     25 mg in      00   :00                           dose, Isabelle           Medica

l



     NaCl 0.9%                                         21 at           [facility]

h



     (NS) 50 mL                                         1945, 50           



     piggyback                                         mL             

 

     FENTanyl PF      2021- No             50ug      50 mcg,           Un

yoselyn



     (SUBLIMAZE      5-10 05-10                          Intravenou           it

y of



     (PF))      00:45: 00:12                          s, ONCE, 1           [place]



     injection      00   :00                           dose, Sun           Medic

al



     50 mcg                                         21 at           Branch



                                                  1945,           



                                                  Routine           

 

     NaCl 0.9%      2021- No             1000mL      at 999           Uni

vers



     (NS) bolus      5-10 05-10                          mL/hr,           ity of



     infusion      00:00: 01:47                          1,000 mL,           Eric

as



     1,000 mL      00   :00                           IV             Medical



                                                  Infusion,           [place]



                                                  ONCE, 1           



                                                  dose, [place]           



                                                  21 at           



                                                  1900, ASAP           

 

     cefdinir      2021- No        [id number] 300mg      Take 1           U

nivers



     300 mg       05-20                          capsule by           ity of



     capsule      00:00: 04:59                          mouth 2           [place]



               00   :00                           (two)           Medical



                                                  times           Branch



                                                  daily for           



                                                  10 days.           

 

     buPROPion      2021- No             150mg QD   Take 1           [facility]



     (WELLBUTRIN      1-17 01-16                          tablet           [facility]

 -



     XL) 150 MG      00:00: 23:59                          (150 mg           Med

ical



     24 hr      00   :00                           total) by           Center



     tablet                                         mouth           



                                                  daily.           

 

     citalopram       No             40mg QD   Take 1           [facility]



     (CELEXA) 40      1-17 -16                          tablet (40           L

ukes -



     MG tablet      00:00: 23:59                          mg total)           Me

dical



               00   :00                           by mouth           Center



                                                  daily.           

 

     oxyCODONE-a            Yes            1{tbl}      Take 1           [facility]



     cetaminophe      1-16                               tablet by           Ni

es -



     n         14:44:                               mouth           Medical



     (PERCOCET)      50                                 every 4           Center



      mg                                         (four)           



     per tablet                                         hours as           



                                                  needed for           



                                                  Pain.           

 

     zinc            Yes            5g   Q.5D Apply 5 g           [facility]



     oxide-yuan      1-16                               topically           Ni

es -



     latum      00:00:                               2 (two)           Medical



     (CRITIC-AID      00                                 times           Center



     ) 20-51 %                                         daily.           



     Pste                                                        



     topical                                                        



     paste                                                        

 

     meropenem            Yes            1g        Inject 1 g           [facility]



     (MERREM)      1-16                               intravenou           [facility]

 -



     MBP 1 gm in      00:00:                               sly every           M

edical



     100 mL NS      00                                 8 (eight)           [facility]

r



                                                  hours.           

 

     insulin            Yes            58U  Q.5D Inject 58           [facility]

t



     glargine      1-16                               Units           [facility]



     (LANTUS)      00:00:                               subcutaneo           Med

ical



     100 unit/mL      00                                 usly 2           Center



     injection                                         (two)           



                                                  times           



                                                  daily Use           



                                                  as             



                                                  directed.           

 

     insulin      2021- No             0U        Inject           [facility]



     lispro      1-16 01-15                          0-12 Units           [facility] 

-



     (HUMALOG)      00:00: 23:59                          subcutaneo           M

edical



     100 unit/mL      00   :00                           usly 3           Center



     injection                                         (three)           



                                                  times           



                                                  daily           



                                                  before           



                                                  meals.           

 

     insulin      2021- No             25U       Inject 25           [facility]



     lispro      1-16 01-15                          Units           [facility]



     (HUMALOG)      00:00: 23:59                          subcutaneo           M

edical



     100 unit/mL      00   :00                           usly 3           Center



     injection                                         (three)           



                                                  times           



                                                  daily           



                                                  before           



                                                  meals.           

 

     normal      2018      No                       1,000 mL,           [facility]

a



     saline 0.9%                                     Rate: 100           l



     IV 1,000 mL      11:04:                               ml/hr,           [facility]

jorge



                                                Infuse           



                                                  over: 10           



                                                  hr, Route:           



                                                  IV, Dosing           



                                                  Weight           



                                                  131.818           



                                                  kg, Total           



                                                  Volume:           



                                                  1,000,           



                                                  Start           



                                                  date:           



                                                  18           



                                                  5:04:00           



                                                  CST,           



                                                  Duration:           



                                                  30 day,           



                                                  Stop date:           



                                                  18           



                                                  5:03:00           



                                                  CST, 2.4,           



                                                  m2             

 

     Magnesium      2018      No                       Notes:           [facility]

a



     Sulfate                                     WASTE: F/P           l



               10:36:                               - Sink; E           [facility]



                                                -              



                                                  Municipal           



                                                  Trash Bin           

 

     Isolyte S      2018      No                       Notes:           [facility]

a



     PH-7.4                                     (Same as:           l



     (Bolus) IV      10:36:                               Isolyte S           He

rmann



                                                PH 7.4)           

 

     Phenergan      2018      No                       12.5 mg,           [facility]

raje[facility]



               08                               0.5 mL,           l



               10:35:                               Route:           Jose                                 IVPB, Drug           



                                                  form: INJ,           



                                                  ONCE,           



                                                  Dosing           



                                                  Weight           



                                                  131.818,           



                                                  kg,            



                                                  Priority:           



                                                  STAT,           



                                                  Start           



                                                  date:           



                                                  18           



                                                  4:35:00           



                                                  CST, Stop           



                                                  date:           



                                                  18           



                                                  4:35:00           



                                                  CST            

 

     Citalopram Citalopram       Yes  Na [place]                1 tablet     

      [facility]



     Hydrobromid Hydrobromid 7-16                                              L

[facility] -



     e    e    00:00:                                              [facility]



                                                                 [facility]

 

     Seroquel Seroquel       Yes  Na [place]                1 tablet         

  [facility]



               7-16                                              [facility]



               00[facility]00:                                              [facility]



                                                                 [facility]

 

     Docusate            Yes                      100 mg = 1           Mem

oria



     Sodium 100      5-08                               cap, PO,           l



     MG Oral      14:56:                               BID, 0           [facility]



     Capsule      00                                 Refill(s)           

 

     Zosyn            Yes                      0              [facility]



               5-08                               Refill(s)           l



               14:56:                                              [place]



               00                                                

 

     celecoxib            Yes                      200 mg = 1           Me

moria



     200 mg oral      5-08                               cap, PO,           l



     capsule      14:56:                               BID, 0           [facility]



               00                                 Refill(s)           

 

     ascorbic            Yes                      500 mg = 1           Mem

oria



     acid      5-08                               tab, PO,           l



               14:56:                               BID, 0           [place]



               00                                 Refill(s)           

 

     acetaminoph            Yes                      1,000 mg =           

[facility]



     en 500 mg      5-08                               2 tab, PO,           l



     oral tablet      14:56:                               Q6Hnow, 0           H

ermann



               00                                 Refill(s)           

 

     Oxycodone            Yes                      5 mg = 1           [facility]

raje[facility]



     Hydrochlori      5-08                               tab, PO,           l



     de 5 MG      14:56:                               Q4H, PRN           [facility]

n



     Oral Tablet      00                                 Pain Score           



                                                  4-6, 0           



                                                  Refill(s)           

 

     zinc            Yes                      220 mg = 1           [facility]



     sulfate 220      5-08                               cap, PO,           l



     mg oral      14:56:                               Daily, 0           [facility]

n



     capsule      00                                 Refill(s)           

 

     multivitami            Yes                      1 tab, PO,           

[facility]



     n         5-08                               Daily, 0           l



               14:56:                               Refill(s)           [facility]



                                                               

 

     methocarbam            Yes                      1,000 mg =           

[facility]



     ol 500 mg      5-08                               2 tab, PO,           l



     oral tablet      14:56:                               Q8H, 0           [facility]

jorge



               00                                 Refill(s)           

 

     LORazepam            Yes                      0.5 mg = 1           Me

moria



     0.5 mg oral      5-08                               tab, PO,           l



     tablet      14:56:                               Q8H, PRN           [facility]



               00                                 Anxiety, 0           



                                                  Refill(s)           

 

     Lidocaine            Yes                      3 patch,           [facility]

raje[facility]



     Hydrochlori      5-08                               TOP,           l



     de 0.05      14:56:                               Daily,           [place]



     MG/MG      00                                 Remove           



     Transdermal                                         after 12           



     Patch                                         hours, 0           



     [Lidoderm]                                         Refill(s)           

 

     Robaxin            No                       Notes:           [facility]



               5-06                               (Same           l



               21:00:                               as:Robaxin           [facility]



                                                )              

 

     Oxycodone            No                       Notes:           [facility]

a



     Hydrochlori      5-06                               (Same as:           l



     de 5 MG      18:06:                               Roxicodone           [facility]

jorge



     Oral Tablet      00                                 )              

 

     Ativan            No                       Notes:           [facility]



               5-06                               (Same as:           l



               15:18:                               Ativan)           [place]



               00                                                

 

     Trazodone            No                       Notes:           [facility]

a



     Hydrochlori      5-06                               (Same As:           l



     de 50 MG      02:00:                               Desyrel)           [facility]

nn



     Oral Tablet      00                                                

 

     remove            No                       Notes:           [facility]



     patch      5-06                               Remove           l



               02:00:                               patch 12           [facility]



               00                                 hours           



                                                  after           



                                                  applicatio           



                                                  n each           



                                                  day.           

 

     Oxycodone            No                       Notes:           [facility]

a



     Hydrochlori      5-05                               (Same as:           l



     de 5 MG      22:01:                               Roxicodone           [facility]

jorge



     Oral Tablet      00                                 )              

 

     Celebrex            No                       Notes:           [facility]



               5-05                               NSAID.           l



               22:00:                               Please           [facility]



               00                                 check           



                                                  indication           



                                                  . Not for           



                                                  seizure.           



                                                  (Same As:           



                                                  CeleBREX)           

 

     Vancomycin            No                        2001 mg:           Me

moria



               5-05                               infuse           l



               21:00:                               over 2.5           [facility]



               00                                 hours           

 

     Lidocaine            No                       Notes:           [facility]

a



     Hydrochlori      5-05                               Apply only           l



     de 0.05      14:00:                               once for           Miguel

n



     MG/MG      00                                 up to 12           



     Transdermal                                         hours in a           



     Patch                                         24-hour           



     [Lidoderm]                                         period (12           



                                                  hours on           



                                                  and 12           



                                                  hours           



                                                  off).           



                                                  (Same as:           



                                                  Lidoderm)           



                                                  "Remove           



                                                  old patch           



                                                  before           



                                                  applicatio           



                                                  n of new           



                                                  patch"           

 

     Phenergan            No                       Notes: Do           [facility]

oria



               5-04                               not give           l



               22:40:                               IV push.           [place]                                 (Same as:           



                                                  Phenergan)           

 

     Dilaudid            No                       Notes:           [facility]



               5-04                               Same as           l



               22:40:                               Dilaudid           [place]



               00                                                

 

     Tramadol            No                       Notes: Not           [facility]

oria



               5-04                               to exceed           l



               22:00:                               400mg/day.           [place]



               00                                 (Same As:           



                                                  Ultram)           

 

     gabapentin            No                       Notes:           [facility]

ia



               5-04                               (Same as:           l



               22:00:                               Neurontin)           [facility]



               00                                                

 

     Acetaminoph            No                       Notes: Max           

[facility]



     en        5-04                               acetaminop           l



               22:00:                               hen 4000           [place]



               00                                 mg/day (4           



                                                  gm/day).           



                                                  (Same as:           



                                                  Tylenol           



                                                  Extra           



                                                  Strength)           

 

     Robaxin            No                       Notes:           [facility]



               5-04                               (Same           l



               22:00:                               as:Robaxin           [place]



               00                                 )              

 

     Oxycodone            No                       Notes:           [facility]

a



     Hydrochlori      5-04                               (Same as:           l



     de 5 MG      21:33:                               Roxicodone           [facility]

jorge



     Oral Tablet      00                                 )              

 

     Beneprotein            No                       Notes:           [facility]

rajeev



     7 gm pkt      5-04                               (Same as:           l



               21:30:                               Beneprotei                                            n)             

 

     Acetaminoph            No                       1 tab, PO,           

[facility]



     en 325 MG /      5-04                               TID, 0           l



     Oxycodone      17:12:                               Refill(s)           [facility]

mateusz



     Hydrochlori      00                                                



     de 10 MG                                                        



     Oral Tablet                                                        



     [Percocet                                                        



     10/325]                                                        

 

     Dilaudid            No                       0.5 mg,           [facility]

a



               5-04                               0.25 mL,           l



               16:01:                               Route:                                            IVP, Drug           



                                                  form: INJ,           



                                                  ONCE,           



                                                  Start           



                                                  date:           



                                                  18           



                                                  11:01:00           



                                                  CDT, Stop           



                                                  date:           



                                                  18           



                                                  11:01:00           



                                                  CDT            

 

     Phenergan            No                       Notes:           [facility]

a



                                              (Same as:           l



               15:43:                               Phenergan)                                                           

 

     Dilaudid            No                       2 mg,           [facility]



               5-04                               Route:           l



               15:43:                               IVP, ONCE,                                            Dosing           



                                                  Weight           



                                                  127.027,           



                                                  kg,            



                                                  Priority:           



                                                  STAT,           



                                                  Start           



                                                  date:           



                                                  18           



                                                  10:43:00           



                                                  CDT, Stop           



                                                  date:           



                                                  18           



                                                  10:43:00           



                                                  CDT            

 

     Docusate            No                       Notes:           [facility]



               -                               (Same as:           l



               14:00:                               Colace)                                            (Do Not           



                                                  Crush)           

 

     Zinc            No                       Notes:           [facility]



     Sulfate                                     (Zinc           l



               14:00:                               sulfate                                            capsule) -           



                                                  220 mg           



                                                  Zinc           



                                                  sulfate =           



                                                  50 mg           



                                                  elemental           



                                                  zinc  Same           



                                                  as Zinc           



                                                  Sulfate           

 

     ascorbic            No                       Notes:           [facility]



     acid      -                               (Same as:           l



               14:00:                               Vitamin C)                                                           

 

     multivitami            No                       Notes:           [facility]

rajeev



     n                                        (Same           l



               14:00:                               as:Thera)                                            WASTE: F/P           



                                                  - Black; E           



                                                  -              



                                                  Municipal           



                                                  Trash Bin           



                                                  Take with           



                                                  food.           

 

     Naproxen            No                       Notes:           [facility]



               5-04                               (Same as:           l



               07:00:                               Naprosyn)                                            Take with           



                                                  food.           

 

     Zosyn            No                       Notes:           [facility]



               5-04                               (Same as:           l



               07:00:                               Zosyn)                                            Dosing           



                                                  based on           



                                                  Piperacill           



                                                  in             



                                                  component           



                                                   ***           



                                                  MEDICATION           



                                                  WASTE ***           



                                                  Product           



                                                  Size:           



                                                  3375 mg           



                                                  Product           



                                                  Wasted:           



                                                  ___ mg           

 

     Vancomycin            No                        2001 mg:           Me

moria



               5-04                               infuse           l



               07:00:                               over 2.5           [place]



               00                                 hours  For           



                                                  adult           



                                                  patients           



                                                  only:           



                                                  Round to           



                                                  nearest           



                                                  250 mg per           



                                                  Medical           



                                                  Staff           



                                                  approval           



                                                  ***            



                                                  MEDICATION           



                                                  WASTE ***           



                                                  Product           



                                                  Size:           



                                                  1000 mg           



                                                  Product           



                                                  Wasted:           



                                                  ___ mg           

 

     Enoxaparin            No                       Notes:           [facility]

ia



               5-04                               (Same as:           l



               07:00:                               Lovenox)           [facility]



               00                                                

 

     Sodium            No                       1,000 mL,           [facility]

a



     Chloride                                     Rate: 125           l



     0.9% IV      06:46:                               ml/hr,           [facility]



     1,000 mL      00                                 Infuse           



                                                  over: 8           



                                                  hr, Route:           



                                                  IV, Dosing           



                                                  Weight           



                                                  127.27 kg,           



                                                  Total           



                                                  Volume:           



                                                  1,000,           



                                                  Start           



                                                  date:           



                                                  18           



                                                  1:46:00           



                                                  CDT,           



                                                  Duration:           



                                                  30 day,           



                                                  Stop date:           



                                                  18           



                                                  1:45:00           



                                                  CDT, 2.44,           



                                                  m2             

 

     Saline            No                       Notes:           [facility]



     Flush 0.9%                                     (Same as:           l



               06:46:                               BD             Jose



                                                Posiflush)           

 

     Acetaminoph            No                       Notes: Do           M

[facility]



     en        -04                               not exceed           l



               06:46:                               4 gm/day.           [facility]



                                                (Same as:           



                                                  Tylenol)           

 

     Acetaminoph            No                       Notes:           [facility]

rajeev



     en 325 MG /      5-                               (Same as:           l



     Hydrocodone      06:46:                               Norco           [facility]

nn



     Bitartrate      00                                 325/5)  Do           



     5 MG Oral                                         not exceed           



     Tablet                                         4gm/day of           



                                                  acetaminop           



                                                  hen.           

 

     Reglan            No                       Notes:           [facility]



               5-04                               (Same as:           l



               04:27:                               Reglan)           [facility]



               00                                                

 

     Benadryl            No                       Notes:           [facility]



               5-04                               (Same as:           l



               04:27:                               Benadryl)           [place]



                                                               

 

     Magnesium            No                       Notes:           [facility]

a



     Sulfate                                     WASTE: F/P           l



               04:26:                               - Sink; E           [facility]



                                                -              



                                                  Municipal           



                                                  Trash Bin           

 

     Sodium            No                       1,000 mL,           [facility]

a



     Chloride      5-04                               1000           l



     0.9%      04:26:                               ml/hr,           [facility]



     (Bolus) IV      00                                 Infuse           



                                                  Over: 1           



                                                  hr, Route:           



                                                  IV, 1,000,           



                                                  Drug form:           



                                                  INJ, ONCE,           



                                                  Priority:           



                                                  STAT,           



                                                  Dosing           



                                                  Weight           



                                                  127.273           



                                                  kg, Start           



                                                  date:           



                                                  18           



                                                  23:26:00           



                                                  CDT, Stop           



                                                  date:           



                                                  18           



                                                  23:26:00           



                                                  CDT            

 

     Zosyn            No                       4.5 gm,           [facility]



                                              Route:           l



               04:10:                               IVPB,           [place]                                 ONCE,           



                                                  Dosing           



                                                  Weight           



                                                  127.273,           



                                                  kg,            



                                                  Priority:           



                                                  STAT,           



                                                  Start           



                                                  date:           



                                                  18           



                                                  23:10:00           



                                                  CDT, Stop           



                                                  date:           



                                                  18           



                                                  23:10:00           



                                                  CDT, ABX           



                                                  Indication           



                                                  :              



                                                  Bacteremia           

 

     Vancomycin            No                         mg:           Me

[facility]



                                              infuse           l



               04:10:                               over 2.5           [facility]                                 hours  For           



                                                  adult           



                                                  patients           



                                                  only:           



                                                  Round to           



                                                  nearest           



                                                  250 mg per           



                                                  Medical           



                                                  Staff           



                                                  approval           



                                                  ***            



                                                  MEDICATION           



                                                  WASTE ***           



                                                  Product           



                                                  Size:           



                                                  1000 mg           



                                                  Product           



                                                  Wasted:           



                                                  ___ mg           

 

     normal            No                       1,000 mL,           [facility]

a



     saline 0.9%                                     Rate: 75           l



     IV 1,000 mL      03:39:                               ml/hr,                                            Infuse           



                                                  over: 13.3           



                                                  hr, Route:           



                                                  IV, Dosing           



                                                  Weight           



                                                  127.273           



                                                  kg, Total           



                                                  Volume:           



                                                  1,000,           



                                                  Start           



                                                  date:           



                                                  18           



                                                  22:39:00           



                                                  CDT,           



                                                  Duration:           



                                                  30 day,           



                                                  Stop date:           



                                                  18           



                                                  22:38:00           



                                                  CDT, 2.36,           



                                                  m2             

 

     Acetaminoph            No                       Notes: Max           

[facility]



     en                                       acetaminop           l



               02:56:                               hen 4000           [place]                                 mg/day (4           



                                                  gm/day).           



                                                  (Same as:           



                                                  Tylenol           



                                                  Extra           



                                                  Strength)           

 

     Rocephin            No                       Notes:           [facility]



                                              (Same As:           l



               02:21:                               Rocephin).           [place]                                   ***           



                                                  MEDICATION           



                                                  WASTE ***           



                                                  Product           



                                                  Size:           



                                                  1000 mg           



                                                  Product           



                                                  Wasted:           



                                                  _0__ mg           

 

     Morphine            No                       4 mg,           [facility]



                                              Route:           l



               00:05:                               IVP, ONCE,                                            Dosing           



                                                  Weight           



                                                  127.273,           



                                                  kg,            



                                                  Priority:           



                                                  STAT,           



                                                  Start           



                                                  date:           



                                                  18           



                                                  19:05:00           



                                                  CDT, Stop           



                                                  date:           



                                                  18           



                                                  19:05:00           



                                                  CDT            

 

     Benadryl            No                       Notes:           [facility]



                                              (Same as:           l



               23:14:                               Benadryl)           [facility]



                                                               

 

     Benadryl            No                       Notes:           [facility]



               5-03                               (Same as:           l



               22:56:                               Benadryl)                                                           

 

     Morphine      2018-0      No                       4 mg, 1           [facility]

a



               5-03                               mL, Route:           l



               22:56:                               IVP, Drug                                            form:           



                                                  SOLN,           



                                                  ONCE,           



                                                  Dosing           



                                                  Weight           



                                                  127.273,           



                                                  kg,            



                                                  Priority:           



                                                  STAT,           



                                                  Start           



                                                  date:           



                                                  18           



                                                  17:56:00           



                                                  CDT, Stop           



                                                  date:           



                                                  18           



                                                  17:56:00           



                                                  CDT            

 

     OXYCODONE      2017      Yes                      Take  by           Univ

ers



     HCL/ACETAMI      2-20                               mouth.           ity of



     NOPHEN      10:03:                                              [place]



     (PERCOCET      17                                                Medical



     ORAL)                                                        [place]

 

     OXYCODONE      2017      Yes                      Take  by           Univ

ers



     HCL/ACETAMI      2-20                               mouth.           ity of



     NOPHEN      04:03:                                              [place]



     (PERCOCET      17                                                Medical



     ORAL)                                                        [place]

 

     OXYCODONE      2017      Yes                      Take  by           Univ

ers



     HCL/ACETAMI      2-20                               mouth.           ity of



     NOPHEN      04:03:                                              [place]



     (PERCOCET      17                                                Medical



     ORAL)                                                        [facility]

 

     OXYCODONE      2017      Yes                      Take  by           Univ

ers



     HCL/ACETAMI      2-20                               mouth.           ity of



     NOPHEN      04:03:                                              [place]



     (PERCOCET      17                                                Medical



     ORAL)                                                        [place]

 

     OXYCODONE      2017      Yes                      Take  by           Univ

ers



     HCL/ACETAMI      2-20                               mouth.           ity of



     NOPHEN      04:03:                                              [place]



     (PERCOCET      17                                                Medical



     ORAL)                                                        [place]

 

     dicyclomine      2017      Yes            20mg      Take 1           Univ

ers



     (BENTYL) 20      2-20                               tablet by           ity

 of



     mg tablet      00:00:                               mouth 4           [place]



               00                                 (four)           Medical



                                                  times           Branch



                                                  daily.           

 

     proMETHazin      2017      Yes            25mg      Take 1           Univ

ers



     e 25 mg      2-20                               tablet by           ity of



     tablet      00:00:                               mouth           [place]



               00                                 every 6           Medical



                                                  (six)           Branch



                                                  hours as           



                                                  needed for           



                                                  Nausea and           



                                                  Vomiting           



                                                  (N/V).           

 

     proMETHazin      2017      Yes            25mg      Take 1           Univ

ers



     e 25 mg      2-20                               tablet by           ity of



     tablet      00:00:                               mouth           [place]



               00                                 every 6           Medical



                                                  (six)           Branch



                                                  hours as           



                                                  needed for           



                                                  Nausea and           



                                                  Vomiting           



                                                  (N/V).           

 

     proMETHazin      2017      Yes            25mg      Take 1           Un[facility]

ers



     e 25 mg      2-20                               tablet by           ity of



     tablet      00:00:                               mouth           [place]



               00                                 every 6           Medical



                                                  (six)           Branch



                                                  hours as           



                                                  needed for           



                                                  Nausea and           



                                                  Vomiting           



                                                  (N/V).           

 

     dicyclomine      -      Yes            20mg      Take 1           Univ

ers



     (BENTYL) 20      2-20                               tablet by           ity

 of



     mg tablet      00:00:                               mouth 4           [place]



               00                                 (four)           Medical



                                                  times           Branch



                                                  daily.           

 

     proMETHazin      2017      Yes            25mg      Take 1           Univ

ers



     e 25 mg      2-20                               tablet by           ity of



     tablet      00:00:                               mouth           [place]



               00                                 every 6           Medical



                                                  (six)           Branch



                                                  hours as           



                                                  needed for           



                                                  Nausea and           



                                                  Vomiting           



                                                  (N/V).           

 

     dicyclomine      -      Yes            20mg      Take 1           Univ

ers



     (BENTYL) 20      2-20                               tablet by           ity

 of



     mg tablet      00:00:                               mouth 4           [place]



               00                                 (four)           Medical



                                                  times           Branch



                                                  daily.           

 

     proMETHazin      -      Yes            25mg      Take 1           Univ

ers



     e 25 mg      2-20                               tablet by           ity of



     tablet      00:00:                               mouth           [place]



               00                                 every 6           Medical



                                                  (six)           Branch



                                                  hours as           



                                                  needed for           



                                                  Nausea and           



                                                  Vomiting           



                                                  (N/V).           

 

     proMETHazin      2017- No             25mg      Take 1           Uni

vers



     e 25 mg      2-20 01-25                          tablet by           ity of



     tablet      00:00: 00:00                          mouth           [place]



               00   :00                           every 6           Medical



                                                  (six)           Branch



                                                  hours as           



                                                  needed for           



                                                  Nausea and           



                                                  Vomiting           



                                                  (N/V).           

 

     dicyclomine      2017- No             20mg      Take 1           [facility]

vers



     (BENTYL) 20      2-20 01-15                          tablet by           it

y of



     mg tablet      00:00: 00:00                          mouth 4           [place]

[facility]



               00   :00                           (four)           Medical



                                                  times           [facility]



                                                  daily.           

 

     proMETHazin      -0      Yes            25mg      Take 1           Univ

ers



     e 25 mg      1-04                               tablet by           ity of



     tablet      00:00:                               mouth           [place]



               00                                 every 6           Medical



                                                  (six)           Branch



                                                  hours as           



                                                  needed for           



                                                  Nausea and           



                                                  Vomiting           



                                                  (N/V) for           



                                                  up to 12           



                                                  doses.           

 

     proMETHazin      2017-0      Yes            25mg      Take 1           Univ

ers



     e 25 mg      1-04                               tablet by           ity of



     tablet      00:00:                               mouth           [place]



               00                                 every 6           Medical



                                                  (six)           Branch



                                                  hours as           



                                                  needed for           



                                                  Nausea and           



                                                  Vomiting           



                                                  (N/V) for           



                                                  up to 12           



                                                  doses.           

 

     proMETHazin      2017-0      Yes            25mg      Take 1           Univ

ers



     e 25 mg      1-04                               tablet by           ity of



     tablet      00:00:                               mouth           [place]



               00                                 every 6           Medical



                                                  (six)           Branch



                                                  hours as           



                                                  needed for           



                                                  Nausea and           



                                                  Vomiting           



                                                  (N/V) for           



                                                  up to 12           



                                                  doses.           

 

     proMETHazin      2017-0      Yes            25mg      Take 1           Univ

ers



     e 25 mg      1-04                               tablet by           ity of



     tablet      00:00:                               mouth           [place]



               00                                 every 6           Medical



                                                  (six)           Branch



                                                  hours as           



                                                  needed for           



                                                  Nausea and           



                                                  Vomiting           



                                                  (N/V) for           



                                                  up to 12           



                                                  doses.           

 

     proMETHazin      2017-0      Yes            25mg      Take 1           Univ

ers



     e 25 mg      1-04                               tablet by           ity of



     tablet      00:00:                               mouth           [place]



               00                                 every 6           Medical



                                                  (six)           Branch



                                                  hours as           



                                                  needed for           



                                                  Nausea and           



                                                  Vomiting           



                                                  (N/V) for           



                                                  up to 12           



                                                  doses.           

 

     proMETHazin      2017-0      Yes            25mg      Take 1           Univ

ers



     e 25 mg      1-04                               tablet by           ity of



     tablet      00:00:                               mouth           [place]



               00                                 every 6           Medical



                                                  (six)           Branch



                                                  hours as           



                                                  needed for           



                                                  Nausea and           



                                                  Vomiting           



                                                  (N/V) for           



                                                  up to 12           



                                                  doses.           

 

     proMETHazin      2017-0      Yes            25mg      Take 1           Univ

ers



     e 25 mg      1-04                               tablet by           ity of



     tablet      00:00:                               mouth           [place]



               00                                 every 6           Medical



                                                  (six)           Branch



                                                  hours as           



                                                  needed for           



                                                  Nausea and           



                                                  Vomiting           



                                                  (N/V) for           



                                                  up to 12           



                                                  doses.           

 

     Tylenol Tylenol           Yes  [facility] [place]                1 tablet           [facility]



     with with                                    as needed           [facility]



     Codeine #4 Codeine #4                                                   [facility]

[facility]



                                                                 l



                                                                 [facility]



                                                                 ent



                                                                 Clinics

 

     Macrobid Macrobid           Yes  [facility] [place]                1 capsule          

 [facility]



                                                  with food           [facility]



                                                                 [facility]



                                                                 l



                                                                 [facility]



                                                                 ent



                                                                 Clinics

 

     Pantoprazol Pantoprazol           Yes  [facility] [place]                1 tablet     

      [facility]



     e Sodium e Sodium                                                   [facility]



                                                                 [facility]



                                                                 l



                                                                 [facility]



                                                                 ent



                                                                 Clinics

 

     Collagenase Collagenase           Yes  [facility] [place]                1            

  [facility]



                                                  applicatio           [facility]



                                                  n to           [facility]



                                                  affected           l



                                                  area           [facility]



                                                                 ent



                                                                 Clinics







Vital Signs







             Vital Name   Observation Time Observation Value Comments     Source

 

             Systolic blood 2022 21:53:00 142 mm[Hg]                [facility]

sity [facility]

 

             Diastolic blood 2022 21:53:00 88 mm[Hg]                 [facility]

 

             Heart rate   2022 21:53:00 96 /min                   [facility]

 

             Body temperature 2022 21:53:00 36.06 Tiffanie                 [facility]

 

             Respiratory rate 2022 21:53:00 18 /min                   [facility]

 

             Oxygen saturation in 2022 21:53:00 96 /min                   

[facility]



             Arterial blood by                                        [place] [facility]

[facility]



             Pulse oximetry                                        [facility]

 

             Body weight  2022 09:48:00 138.801 kg                [facility]

[facility]



                                                                 [facility]



                                                                 [facility]

 

             BMI          2022 09:48:00 61.80 kg/m2               [facility]

ty of



                                                                 [facility]



                                                                 [facility]

 

             Body height  2022 10:27:00 149.9 cm                  [facility]

ty of



                                                                 [facility]



                                                                 [facility]

 

             Systolic blood 2022 03:50:00 142 mm[Hg]                [facility]

sity of



             pressure                                            [facility]



                                                                 Branch

 

             Diastolic blood 2022 03:50:00 95 mm[Hg]                 [facility]

[facility]



             pressure                                            [facility]



                                                                 Branch

 

             Heart rate   2022 03:50:00 93 /min                   [facility]

[facility] of



                                                                 [facility]



                                                                 Branch

 

             Respiratory rate 2022 03:50:00 17 /min                   [facility]iv

ers[facility]



                                                                 [facility]



                                                                 Branch

 

             Oxygen saturation in 2022 03:50:00 98 /min                   

[facility]



             Arterial blood by                                        [facility]

[facility]



             Pulse oximetry                                        Branch

 

             Body temperature 2022 20:39:00 36.5 Tiffanie                  [facility]

[facility] of



                                                                 [facility]



                                                                 Branch

 

             Body weight  2022 20:39:00 136.079 kg                [facility]

ty of



                                                                 [facility]



                                                                 Branch

 

             BMI          2022 20:39:00 60.59 kg/m2               [facility]

ty of



                                                                 [facility]



                                                                 Branch

 

             Systolic blood 2022 01:46:00 134 mm[Hg]                [facility]iver

sity of



             pressure                                            [facility]



                                                                 Branch

 

             Diastolic blood 2022 01:46:00 100 mm[Hg]                [facility]

[facility]ity of



             pressure                                            [facility]



                                                                 Branch

 

             Heart rate   2022 01:46:00 102 /min                  [facility]

ty of



                                                                 [facility]



                                                                 Branch

 

             Respiratory rate 2022 01:46:00 22 /min                   Univ

ersity of



                                                                 [facility]



                                                                 Branch

 

             Oxygen saturation in 2022 01:46:00 96 /min                   

[facility]



             Arterial blood by                                        [facility]

[facility]



             Pulse oximetry                                        Branch

 

             Body temperature 2022-01-15 20:52:00 36.67 Tiffanie                 [facility]iv

ersity of



                                                                 [facility]



                                                                 Branch

 

             Body weight  2022-01-15 20:52:00 136.079 kg                [facility]

ty of



                                                                 [facility]



                                                                 Branch

 

             BMI          2022-01-15 20:52:00 60.59 kg/m2               [facility]

ty of



                                                                 [facility]



                                                                 Branch

 

             Systolic blood 2021 23:30:00 175 mm[Hg]                Univer

sity of



             pressure                                            [facility]



                                                                 Branch

 

             Diastolic blood 2021 23:30:00 107 mm[Hg]                [facility]

[facility]ity of



             pressure                                            [facility]



                                                                 Branch

 

             Heart rate   2021 23:30:00 81 /min                   [facility]

ty of



                                                                 [facility]



                                                                 Branch

 

             Respiratory rate 2021 23:30:00 21 /min                   [facility]

ersity of



                                                                 [facility]



                                                                 Branch

 

             Oxygen saturation in 2021 23:30:00 97 /min                   

[facility]



             Arterial blood by                                        [facility]

[facility]



             Pulse oximetry                                        Branch

 

             Body weight  2021 21:55:00 136.079 kg                [facility]

ty of



                                                                 [facility]



                                                                 Branch

 

             BMI          2021 21:55:00 60.59 kg/m2               [facility]

ty of



                                                                 [facility]



                                                                 Branch

 

             Body temperature 2021 21:55:00 37.44 Tiffanie                 Un[facility]

ersity of



                                                                 [facility]



                                                                 Branch

 

             Systolic blood 2021-05-10 01:30:00 163 mm[Hg]                Univer

sity of



             pressure                                            [facility]



                                                                 Branch

 

             Diastolic blood 2021-05-10 01:30:00 106 mm[Hg]                Un[facility]

rsity of



             pressure                                            [facility]



                                                                 Branch

 

             Heart rate   2021-05-10 01:30:00 97 /min                   [facility]

ty of



                                                                 [facility]



                                                                 Branch

 

             Respiratory rate 2021-05-10 01:30:00 21 /min                   Univ

ersity of



                                                                 [facility]



                                                                 Branch

 

             Oxygen saturation in 2021-05-10 01:30:00 97 /min                   

[facility]



             Arterial blood by                                        [facility]

[facility]



             Pulse oximetry                                        Branch

 

             Body temperature 2021 23:27:00 36.83 Tiffanie                 Univ

ersity of



                                                                 [facility]



                                                                 Branch

 

             Body height  2021 23:27:00 149.9 cm                  [facility]

ty of



                                                                 [facility]



                                                                 Branch

 

             Body weight  2021 23:27:00 136.079 kg                [facility]

ty of



                                                                 [facility]



                                                                 Branch

 

             BMI          2021 23:27:00 60.59 kg/m2               [facility]

ty of



                                                                 [facility]



                                                                 Branch

 

             Systolic blood 2021-05-10 01:30:00 163 mm[Hg]                Univer

sity of



             pressure                                            [facility]



                                                                 Branch

 

             Diastolic blood 2021-05-10 01:30:00 106 mm[Hg]                Unive

rsity of



             pressure                                            [facility]



                                                                 Branch

 

             Heart rate   2021-05-10 01:30:00 97 /min                   [facility]

ty of



                                                                 [facility]



                                                                 Branch

 

             Respiratory rate 2021-05-10 01:30:00 21 /min                   Univ

ersity of



                                                                 [facility]



                                                                 Branch

 

             Oxygen saturation in 2021-05-10 01:30:00 97 /min                   

[facility]



             Arterial blood by                                        [facility]

[facility]



             Pulse oximetry                                        [facility]

 

             Body temperature 2021 23:27:00 36.83 Tiffanie                 [facility]

 

             Body height  2021 23:27:00 149.9 cm                  [facility]

 

             Body weight  2021 23:27:00 136.079 kg                [facility]

 

             BMI          2021 23:27:00 60.59 kg/m2               [facility]

 

             Respitory Rate 2018 17:30:00                           [facility]

 

             Systolic (mm Hg) 2018 17:30:00                           [facility] [place]

 

             Diastolic (mm Hg) 2018 17:30:00                           [facility]

 

             Temperature Oral (F) 2018 17:30:00 98.4 F                    

[facility]

 

             Temperature Oral (F) 2018 16:23:00 98.6 F                    

[facility]

 

             Systolic (mm Hg) 2018 16:23:00                           [facility] [place]

 

             Diastolic (mm Hg) 2018 16:23:00                           [facility]

 

             Respitory Rate 2018 14:00:00                           [facility]

[facility]

 

             Systolic (mm Hg) 2018 14:00:00                           [facility] [place]

 

             Diastolic (mm Hg) 2018 14:00:00                           [facility] [place]

 

             Respitory Rate 2018 13:30:00                           [facility]

[facility]

 

             BMI Calculated 2018 10:16:00                           [facility]

[facility] [place]

 

             Height       2018 10:16:00 149.86 cm                 [facility]

 

             Weight       2018 10:16:00                           [facility]

 [place]

 

             Heart Rate   2018 10:16:00                           [facility]

 

             Temperature Oral (F) 2018 10:16:00 97.9 F                    

[facility]

 

             Temperature Oral (F) 2018 13:40:00 97.2 F                    

[facility]

 

             Systolic (mm Hg) 2018 13:40:00                           [facility] [place]

 

             Diastolic (mm Hg) 2018 13:40:00                           [facility]

[facility]

 

             Heart Rate   2018 13:40:00                           [facility]

 

             Respitory Rate 2018 13:40:00                           [facility]

[facility]

 

             Heart Rate   2018 05:24:00                           [facility]

 

             Systolic (mm Hg) 2018 05:24:00                           [facility]

[facility]

 

             Diastolic (mm Hg) 2018 05:24:00                           [facility]

[facility] [place]

 

             Respitory Rate 2018 05:24:00                           [facility]

[facility]

 

             Temperature Oral (F) 2018 05:24:00 98.1 F                    

[facility]

 

             Respitory Rate 2018 01:15:00                           [facility]

[facility]

 

             Systolic (mm Hg) 2018 01:15:00                           [facility]

[facility] [place]

 

             Diastolic (mm Hg) 2018 01:15:00                           [facility]

[facility] [place]

 

             Heart Rate   2018 01:15:00                           [facility]

 [place]

 

             Temperature Oral (F) 2018 01:15:00 98.1 F                    

[facility]

 

             Weight       2018 14:00:00                           [facility]

 

             Weight       2018 14:00:00                           [facility]

 

             Weight       2018 14:00:00                           [facility]

 [place]

 

             BMI Calculated 2018 05:43:00                           [facility]

[facility] [place]

 

             Height       2018 05:43:00 162.56 cm                 [facility]

 [place]

 

             Height       2018 22:41:00 149.86 cm                 [facility]

 

             BMI Calculated 2018 22:41:00                           [facility]

[facility]







Procedures







                Procedure       Date / Time     Performing Clinician Source



                                Performed                       

 

                XR CHEST 1 VW   2022 03:03:32 Mirta Tate  [facility]

 

                COMP. METABOLIC PANEL 2022 11:57:00 [facility]



                ([id number])                                         Medical Branch

 

                CBC WITH DIFF   2022 11:57:00 [facility]

 

                BASIC METABOLIC PANEL ([facility], [facility]10[facility]



                K, CL, CO2, GLUCOSE, BUN,                                 Medica

l Branch



                CREATININE, CA)                                 

 

                [facility]

 

                URINALYSIS      2022 00:40:00 Suly Luna [facility]

 

                URINE CULTURE   2022 00:40:00 Suly Luna [facility]

 

                FECES CULTURE   2022 14[facility]58[facility]00 [facility]

 

                OCCULT (GUAIAC) BLOOD [facility]22 [facility]58[facility]00 [facility]

 

                CLOSTRIDIUM DIFFICILE 2022 14:58:00 [facility]



                TOXIN                                           [facility]

 

                FECAL PATHOGENS BY PCR 2022 14:58:00 [facility]

 

                URINE DRUG (IMMUNOASSAY) - 2022 10:11:00 Edionwe, [facility]



                COMPREHENSIVE DRUG SCREEN                                 [facility]

 Branch

 

                COVID-19 (ID NOW RAPID 2022 07:33:00 Silvino Garay [facility]



                TESTING)                                        [facility]

 

                LAB ONLY COVID  2022 07:33:00 Silvino Garay [facility]



                INTERPRETATION                                  [facility]

 

                COMP. METABOLIC PANEL 2022 06:18:00 Silvino Garay [facility]



                ([id number])                                         [facility]

 

                CBC WITH DIFF   2022 05:40:00 Silvino Garay [facility]

 

                URINALYSIS      2022 01:43:00 Alma Villaseñor [facility]

 

                LIPASE          2022 01:40:00 Alma Villaseñor [facility]

 

                COMP. METABOLIC PANEL 2022 01:40:00 Alma Villaseñor [facility]



                ([id number])                                         [facility]

 

                CBC WITH DIFF   2022 01:38:00 Alma Villaseñor [facility]

 

                XR CHEST 1 VW   2022 23:40:19 Alma Villaseñor [facility]

 

                ASSIGNMENT OF BENEFITS 2022 22:05:40 Doctor Unassigned, [facility]



                                                [place]         [facility]

 

                NOTICE OF PRIVACY 2022 22:04:58 Doctor [facility]ass[facility], [facility]



                PRACTICES                       [place]         [facility]

 

                CONSENT/REFUSAL FOR 2022 22:04:33 Doctor Unasspablo, [facility]



                DIAGNOSIS AND TREATMENT                 [place]         [facility]

 

                URINALYSIS      2022-01-15 23:09:00 Neeta Maria [facility]

 

                BLOOD CULTURE SCREEN 2022-01-15 23:04:00 Neeta Maria [facility]

 

                D-DIMER         2022-01-15 22:49:00 Neeta Maria [facility]

 

                COVID-19 (ID NOW RAPID 2022-01-15 22:48:00 Neeta Maria [facility] [place]



                TESTING)                                        [facility]

 

                COMP. METABOLIC PANEL 2022-01-15 22:17:00 Neeta Maria [facility]

[facility] [place]



                ([id number])                                         Medical Branch

 

                CBC WITH DIFF   2022-01-15 22:17:00 Neeta Maria [facility]

 

                XR CHEST 1 VW   2022-01-15 21:47:19 Neeta Maria [facility]

 

                COMP. METABOLIC PANEL 2021 22:20:00 Chris Mcpherson [facility]

[facility] [place]



                ([id number])                                         Medical Branch

 

                CBC WITH DIFF   2021 22:20:00 Chris Mcpherson [place] [facility]

[facility]

 

                CT ABDOMEN PELVIS WO 2021-05-10 00:39:40 Acacia Najera [facility]

[facility] [place]



                CONTRAST                                        [facility]

 

                LIPASE          2021-05-10 00:06:00 Acacia Najera [facility]

 

                COMP. METABOLIC PANEL 2021-05-10 00:06:00 [facility]



                ([id number])                                         [facility]

 

                CBC WITH DIFF   2021-05-10 00:06:00 Acacia Najera [facility]

 

                URINALYSIS      2021-05-10 00:00:00 Acacia Najera [facility]

 

                COVID-19 (ID NOW RAPID 2021-05-10 00:00:00 Acacia Najera [facility] 

[place]



                TESTING)                                        [facility]

 

                Cholecystectomy                                 [facility]

 

                Shunt of cerebral                                 [facility]

nn



                ventricle to extracranial                                 



                site                                            







Plan of Care







             Planned Activity Planned Date Details      Comments     Source

 

             Future Scheduled 2023-01-10   Lipid panel               [facility]

s -



             Test         00:00:00     (procedure) [code =              Medical 

Center



                                       32162230]                 

 

             Future Scheduled 2021   INFLUENZA VACCINE              [facility]



             Test         00:00:00     (Season Ended) [code =              Medic

al Center



                                       INFLUENZA VACCINE              



                                       (Season Ended)]              

 

             Future Scheduled 2021   DEPRESSION SCREENING              [facility]



             Test         00:00:00     (12+) [code =              Medical Center



                                       DEPRESSION SCREENING              



                                       (12+)]                    

 

             Future Scheduled 2020-04-10   Hemoglobin A1c              [facility]

[facility] -



             Test         00:00:00     measurement               Medical Center



                                       (procedure) [code =              



                                       66907061]                 

 

             Future Scheduled 2004   DTAP/TDAP/TD VACCINES              [facility]



             Test         00:00:00     (1 - Tdap) [code =              Medical C

enter



                                       DTAP/TDAP/TD VACCINES              



                                       (1 - Tdap)]               

 

             Future Scheduled 2003   HEPATITIS C SCREENING              [facility]



             Test         00:00:00     [code = HEPATITIS C              Medical 

Center



                                       SCREENING]                

 

             Future Scheduled 1997   COVID-19 VACCINE (1)              [facility]



             Test         00:00:00     [code = COVID-19              Medical [facility]

ter



                                       VACCINE (1)]              

 

             Future Scheduled 1995   DIABETIC EYE EXAM              [facility]



             Test         00:00:00     [code = DIABETIC EYE              Medical

 Center



                                       EXAM]                     

 

             Future Scheduled 1995   Urine screening for              [facility]



             Test         00:00:00     protein (procedure)              Medical 

Center



                                       [code = 962601309]              

 

             Future Scheduled 1991   PNEUMOCOCCAL VACCINE              [facility]



             Test         00:00:00     0-64 YRS (1 of 1 -              Medical C

enter



                                       PPSV23) [code =              



                                       PNEUMOCOCCAL VACCINE              



                                       0-64 YRS (1 of 1 -              



                                       PPSV23)]                  







Encounters







        Start   End     Encounter Admission Attending Care    Care    Encounter 

Source



        Date/Time Date/Time Type    Type    Clinicians Facility Department ID   

   

 

        2022         Outpatient         [facility]  [facility]  [id number]

 [facility]



        13:45:16                         Domingo                  [id number]   [facility]



                                                                        Outpati



                                                                        ent



                                                                        Clinics

 

        20[facility]  

 [facility]



        13:17:39                         Dom[facility]                  [id number]   [facility]



                                                                        [facility]



                                                                        l



                                                                        Outpati



                                                                        ent



                                                                        Clinics

 

        2022         Outpatient         [facility]  [facility]  

 [facility]



        13:16:34                         Domingo                  [id number]   [facility]



                                                                        [facility]



                                                                        l



                                                                        Outpati



                                                                        ent



                                                                        Clinics

 

        2022 Transition         IMTIAZ Guzman  [id number]

[id number] Univers



        00:00:00 00:00:00 of Care         Dulce LYY   [id number]        

 ity [facility]   [id number]         [place]

s



                                                        [phone]         Medi

sarah



                                                        403             Branch

 

        2022 Inpatient X       [facility]    KRISH     [id number]

27 Univers



        19:07:00 19:39:00                 [facility]

 

        2022 [facility]    1.2.840.

114 [id number] 

Univers



        19:07:00 19:39:00 Encounter         BishoppapoTeagan llanes 350.1.13.10 

        ity [facility] 4.2.7.2.686         [facility]  [phone]         Medi

sarah



                                                        081             Branch

 

        2022 Emergency X       [facility]    ERT     [id number]

54 Univers



        14:41:00 22:07:00                 ALMA metlon [facility]

 

        2022 Emergency         [facility]    [id number]

7030 Univers



        14:41:00 22:07:00                 Alma MCCARTHY 350.1.13.10         

ity [facility] 4.2.7.2.686         [facility]  [phone]         Medi

sarah



                                                        084             Branch

 

        2022-01-15 2022-01-15 Emergency X       [facility]    [facility]     [id number]

25 Univers



        14:49:00 21:33:00                 NEETA melton [facility]

 

        2022-01-15 2022-01-15 Emergency         [facility]    [id number]

0725 Univers



        14:49:00 21:33:00                 Neeta MCCARTHY 350.1.13.10         

ity of



                                                [place] 4.2.7.2.686         [facility]  [phone]         [address]

 

        2021 Laboratory         Only, Ang Db Test [facility]    1.2.8

40.114 [id number] 

Univers



        18:00:00 18:15:00 Only            Nicole Llanes [facility]  350.1.13.10      

   ity of



                                                [place] 4.2.7.2.686         Eric

as



                                                HÉCTOR?BLEA [phone]         [address]



                                                MEDICAL                 



                                                OFFICE                  



                                                BUILDING                 

 

        2021 Outpatient R       MARIO ALBERTO   [facility]    [id number]

787 Univers



        18:00:00 18:00:00                 NICOLE                          [facility]

 

        2021 ambulatory                 STLMLC  STLMLC  [id number]

 [facility]



        [facility]00                                                 [facility]



                                                                        [facility]



                                                                        Outpati



                                                                        ent



                                                                        Clinics

 

        2021 ambulatory                 STLMLC  STLMLC  [id number]

 [facility]



        00:[facility]00[facility]00                                                 [facility]



                                                                        l



                                                                        Outpati



                                                                        ent



                                                                        Clinics

 

        2021 Emergency X       SINGER, [facility]    ERT     [id number]

74 Univers



        15:54:00 18:34:00                 CHRIS                         lissa [facility]

 

        2021 Emergency         [facility]    [id number]

8236 Univers



        15:54:00 18:34:00                 Chris MCCARTHY 350.1.13.10         i

ty of



                                                [facility] 4.2.7.2.686         [facility]  [phone]         [address]

 

        2021-10-12 2021-10-12 Outpatient                 STLMLC  STLMLC  [id number]

 [facility]



        00:[facility]00[facility]00                                                 [facility]



                                                                        l



                                                                        Outpati



                                                                        ent



                                                                        Clinics

 

        2021 Outpatient                 STLMLC  STLMLC  [id number]

 [facility]



        00:00[facility]00 [facility]00:00                                                 [facility]



                                                                        l



                                                                        Outpati



                                                                        ent



                                                                        Clinics

 

        2021-08-10 2021-08-10 Outpatient                 STLMLC  STLMLC  [id number]

 [facility]



        00:00[facility]00 [facility]00[facility]00                                                 [facility]



                                                                        l



                                                                        Outpati



                                                                        ent



                                                                        Clinics

 

        2021 Outpatient                 [facility]  [id number]

 [facility]



        00:00[facility]00 00[facility]00:00                                                 [facility]



                                                                        [facility]



                                                                        l



                                                                        Outpati



                                                                        ent



                                                                        Clinics

 

        2021 Outpatient                 [facility]  [id number]

 [facility]



        00:00:00 00:00:00                                                 [facility]



                                                                        l



                                                                        Outpati



                                                                        ent



                                                                        [facility]

 

        2021 Emergency         [facility]    1.2.840.114 [id number]

[id number] 



        18:22:00 22:21:00                 Acacia TRENT [place] 350.1.13.10        

 



                                                [place] 4.2.7.2.686         



                                                [place]  [phone]         



                                                        [facility]             

 

        2021 Emergency         [facility]    1.2.840.114 [id number]

[id number] [facility]



        18:22:00 22:21:00                 Acacia Anguianoton 350.1.13.10        

 [facility] 4.2.7.2.686         [facility]  [phone]         [address]

 

        2021 Emergency X       [facility]    ERT     [id number]

4744 Univers



        18:22:00 18:22:00                 ACACIA                         ity [facility]

 

        2019 Phone                   nullFlavo MNA     [id number]

55 [facility]



        16:11:26 04:59:59 Message                 r       Neurosurger 08      l



                                                        y [facility]

 

        2019 Phone                   nullFlavo MNA     [id number]

55 [facility]



        16:16:47 04:59:59 Message                 r       Neurosurger 07      l



                                                        y [facility]

 

        2019-07-15 2019 Phone                   nullFlavo MNA     [id number]

55 [facility]



        15:52:03 04:59:59 Message                 r       Neurosurger 06      l



                                                        y [facility]

 

        2019 Phone                   nullFlavo MNA     [id number]

55 [facility]



        18:55:03 04:59:59 Message                 r       Neurosurger 05      l



                                                        y [facility]

 

        2018 Emergency                 nullFlavo [facility] [id number]

[address] [facility]



        10:12:00 18:24:00                         r       [place] 12      l



                                                        [facility]

 

        2018 Outpatient                 [facility] [address]

[id number] [facility]



        11:15:00 11:15:00                         t [facility]

s -



                                                [employer]   [facility]                 Outpati



                                                                        ent



                                                                        Clinics

 

        2018 Outpatient                 [facility] [address]

[address] [facility]



        11:30:00 11:30:00                         t [place]   [employer]

s -



                                                Drive   [facility]                 Outpati



                                                                        ent



                                                                        Clinics

 

        2018 Outpatient                 [facility] [address]

[id number] [facility]



        15:41:00 15:41:00                         t [place]   [employer]

s -



                                                [employer]   [facility]



                                                                        [facility]



                                                                        [facility]

 

        2018 Outpatient                 [facility] [address]

[address] [facility]



        15:42:00 15:42:00                         t [place]   [employer]

s -



                                                [employer]   [facility]                 Outpati



                                                                        ent



                                                                        Clinics

 

        2018 Outpatient                 [facility] [address]

[id number] [facility]



        11:00:00 11:00:00                         t [place]   [employer]

s -



                                                [employer]   [facility]                 Outpati



                                                                        ent



                                                                        Clinics

 

        2018 Inpatient                 [facility] [address]

[id number] [facility]



        22:40:00 17:28:00                         [facility] 23      [facility]







Results







           Test Description Test Time  Test Comments Results    Result Comments 

Source









                    COMP. METABOLIC PANEL (48540) 2022 12:48:40 









                      Test Item  Value      Reference Range Interpretation Comme

nts









             NA (test code = 8382523322) 137 mmol/L   135-145                   

 

             K (test code = 5646470905) 4.5 mmol/L   3.5-5.0                   

 

             CL (test code = 5991288278) 107 mmol/L                       

 

             CO2 TOTAL (test code = 9365289387) 24 mmol/L    23-31              

       

 

             AGAP (test code = 6226715439)              2-16                    

  

 

             BUN (test code = 9617179102) 16 mg/dL     7-23                     

 

 

             GLUCOSE (test code = 2393153614) 116 mg/dL           H       

     

 

             CREATININE (test code = 0.62 mg/dL   0.60-1.25                 



             5777614612)                                         

 

             TOTAL BILI (test code = 0.4 mg/dL    0.1-1.1                   



             1246119787)                                         

 

             CALCIUM (test code = 0091326450) 8.7 mg/dL    8.6-10.6             

     

 

             T PROTEIN (test code = 3383566476) 7.5 g/dL     6.3-8.2            

       

 

             ALBUMIN (test code = 4290859059) 3.9 g/dL     3.5-5.0              

     

 

             ALK PHOS (test code = 1993289522) 93 U/L                     

      

 

             ALTv (test code = 1742-6) 40 U/L       5-50                      

 

             AST(SGOT) (test code = 8451520405) 51 U/L       13-40        H     

       

 

             eGFR (test code = 5506035359)              mL/min/1.73m2           

   

 

             MAL (test code = MAL) Association of Glomerular                    

       



                          Filtration Rate (GFR) and Staging                     

      



                          of Kidney Disease*                           



                          +-----------------------+--------                     

      



                          -------------+-------------------                     

      



                          ------+| GFR (mL/min/1.73 m2) ?|                      

     



                          With Kidney Damage ?| ?Without                        

   



                          Kidney                                 



                          Damage+-----------------------+--                     

      



                          -------------------+-------------                     

      



                          ------------+| ?>90 ? ? ? ? ? ? ?                     

      



                          ? ?| ?Stage one ? ? ? ? ?| ?                          

 



                          Normal ? ? ? ? ? ? ?                           



                          ?+-----------------------+-------                     

      



                          --------------+------------------                     

      



                          -------+| ?60-89 ? ? ? ? ? ? ? ?|                     

      



                          ?Stage two ? ? ? ? ?| ? Decreased                     

      



                          GFR ? ? ? ?                            



                          +-----------------------+--------                     

      



                          -------------+-------------------                     

      



                          ------+| ?30-59 ? ? ? ? ? ? ? ?|                      

     



                          ?Stage three ? ? ? ?| ? Stage                         

  



                          three ? ? ? ? ?                           



                          +-----------------------+--------                     

      



                          -------------+-------------------                     

      



                          ------+| ?15-29 ? ? ? ? ? ? ? ?|                      

     



                          ?Stage four ? ? ? ? | ? Stage                         

  



                          four ? ? ? ? ?                           



                          ?+-----------------------+-------                     

      



                          --------------+------------------                     

      



                          -------+| ?<15 (or dialysis) ? ?|                     

      



                          ?Stage five ? ? ? ? | ? Stage                         

  



                          five ? ? ? ? ?                           



                          ?+-----------------------+-------                     

      



                          --------------+------------------                     

      



                          -------+ *Each stage assumes the                      

     



                          associated GFR level has been in                      

     



                          effect for at least three months.                     

      



                          ?Stages 1 to 5, with or without                       

    



                          kidney disease, indicate chronic                      

     



                          kidney disease. Notes:                           



                          Determination of stages one and                       

    



                          two (with eGFR >59mL/min/1.73 m2)                     

      



                          requires estimation of kidney                         

  



                          damage for at least three months                      

     



                          as defined by structural or                           



                          functional abnormalities of the                       

    



                          kidney, manifested by                           



                          either:Pathological abnormalities                     

      



                          or Markers of kidney damage                           



                          (including abnormalities in the                       

    



                          composition of the blood or urine                     

      



                          or abnormalities in imaging                           



                          tests).                                

 

             Lab Interpretation (test code = Abnormal                           

    



             59455-5)                                            



[facility] WITH [id number] 12:21:36





             Test Item    Value        Reference Range Interpretation Comments

 

             WBC (test code =              See_Comment                [Automated



             6690-2)                                             message] The sy

stem



                                                                 which generated



                                                                 this result



                                                                 transmitted



                                                                 reference range

:



                                                                 4.20 - 10.70



                                                                 10*3/?L. The



                                                                 reference range

 was



                                                                 not used to



                                                                 interpret this



                                                                 result as



                                                                 normal/abnormal

.

 

             RBC (test code =              See_Comment                [Automated



             789-8)                                              message] The sy

stem



                                                                 which generated



                                                                 this result



                                                                 transmitted



                                                                 reference range

:



                                                                 4.26 - 5.52



                                                                 10*6/?L. The



                                                                 reference range

 was



                                                                 not used to



                                                                 interpret this



                                                                 result as



                                                                 normal/abnormal

.

 

             HGB (test code = 15.7 g/dL    12.2-16.4                 



             718-7)                                              

 

             HCT (test code = 48.0 %       38.4-49.3                 



             4544-3)                                             

 

             MCV (test code = 90.1 fL      81.7-95.6                 



             787-2)                                              

 

             MCH (test code = 29.5 pg      26.1-32.7                 



             785-6)                                              

 

             MCHC (test code = 32.7 g/dL    31.2-35.0                 



             786-4)                                              

 

             RDW-SD (test code = 50.6 fL      38.5-51.6                 



             89132-2)                                            

 

             RDW-CV (test code = 15.3 %       12.1-15.4                 



             788-0)                                              

 

             PLT (test code =              See_Comment  H             [Automated



             777-3)                                              message] The sy

stem



                                                                 which generated



                                                                 this result



                                                                 transmitted



                                                                 reference range

:



                                                                 150 - 328 10*3/

?L.



                                                                 The reference r

zhen



                                                                 was not used to



                                                                 interpret this



                                                                 result as



                                                                 normal/abnormal

.

 

             MPV (test code = 10.3 fL      9.8-13.0                  



             59849-4)                                            

 

             NRBC/100 WBC (test              See_Comment                [Automat

ed



             code = 8423933660)                                        message] 

The system



                                                                 which generated



                                                                 this result



                                                                 transmitted



                                                                 reference range

:



                                                                 0.0 - 10.0 /100



                                                                 WBCs. The refer

ence



                                                                 range was not u

sed



                                                                 to interpret th

is



                                                                 result as



                                                                 normal/abnormal

.

 

             NRBC x10^3 (test code <0.01        See_Comment                [Auto

mated



             = [id number])                                        message] The s

ystem



                                                                 which generated



                                                                 this result



                                                                 transmitted



                                                                 reference range

:



                                                                 10*3/?L. The



                                                                 reference range

 was



                                                                 not used to



                                                                 interpret this



                                                                 result as



                                                                 normal/abnormal

.

 

             GRAN MAT (NEUT) % 61.5 %                                 



             (test code = 770-8)                                        

 

             IMM GRAN % (test code 0.80 %                                 



             = 9059599291)                                        

 

             LYMPH % (test code = 27.8 %                                 



             736-9)                                              

 

             MONO % (test code = 6.9 %                                  



             5905-5)                                             

 

             EOS % (test code = 2.4 %                                  



             713-8)                                              

 

             BASO % (test code = 0.6 %                                  



             706-2)                                              

 

             GRAN MAT x10^3(ANC) 6.07 10*3/uL 1.99-6.95                 



             (test code =                                        



             8074636402)                                         

 

             IMM GRAN x10^3 (test 0.08 10*3/uL 0.00-0.06    H            



             code = 5115853579)                                        

 

             LYMPH x10^3 (test code 2.75 10*3/uL 1.09-3.23                 



             = 731-0)                                            

 

             MONO x10^3 (test code 0.68 10*3/uL 0.36-1.02                 



             = 742-7)                                            

 

             EOS x10^3 (test code = 0.24 10*3/uL 0.06-0.53                 



             711-2)                                              

 

             BASO x10^3 (test code 0.06 10*3/uL 0.01-0.09                 



             = 704-7)                                            

 

             Lab Interpretation Abnormal                               



             (test code = 19219-6)                                        



[facility] Metabolic Panel (NA, K, CL, CO2, 
GLUCOSE, BUN, CREATININE, CA)2022 12:40:30





             Test Item    Value        Reference Range Interpretation Comments

 

             NA (test code = 139 mmol/L   135-145                   



             0655008799)                                         

 

             K (test code = 3.9 mmol/L   3.5-5.0                   



             5084720913)                                         

 

             CL (test code = 108 mmol/L                       



             4036507507)                                         

 

             CO2 TOTAL (test code = 25 mmol/L    23-31                     



             8130846731)                                         

 

             AGAP (test code =              2-16                      



             0989588560)                                         

 

             BUN (test code = 14 mg/dL     7-23                      



             3050704277)                                         

 

             GLUCOSE (test code = 107 mg/dL                        



             6654861283)                                         

 

             CREATININE (test code = 0.61 mg/dL   0.60-1.25                 



             5740192951)                                         

 

             CALCIUM (test code = 8.1 mg/dL    8.6-10.6     L            



             2424110409)                                         

 

             eGFR (test code =              mL/min/1.73m2              



             9752475813)                                         

 

             MAL (test code = MAL) Association of                           



                          Glomerular Filtration                           



                          Rate (GFR) and Staging                           



                          of Kidney Disease*                           



                          +---------------------                           



                          --+-------------------                           



                          --+-------------------                           



                          ------+| GFR                           



                          (mL/min/1.73 m2) ?|                           



                          With Kidney Damage ?|                           



                          ?Without Kidney                           



                          Damage+---------------                           



                          --------+-------------                           



                          --------+-------------                           



                          ------------+| ?>90 ?                           



                          ? ? ? ? ? ? ? ?|                           



                          ?Stage one ? ? ? ? ?|                           



                          ? Normal ? ? ? ? ? ? ?                           



                          ?+--------------------                           



                          ---+------------------                           



                          ---+------------------                           



                          -------+| ?60-89 ? ? ?                           



                          ? ? ? ? ?| ?Stage two                           



                          ? ? ? ? ?| ? Decreased                           



                          GFR ? ? ? ?                            



                          +---------------------                           



                          --+-------------------                           



                          --+-------------------                           



                          ------+| ?30-59 ? ? ?                           



                          ? ? ? ? ?| ?Stage                           



                          three ? ? ? ?| ? Stage                           



                          three ? ? ? ? ?                           



                          +---------------------                           



                          --+-------------------                           



                          --+-------------------                           



                          ------+| ?15-29 ? ? ?                           



                          ? ? ? ? ?| ?Stage four                           



                          ? ? ? ? | ? Stage four                           



                          ? ? ? ? ?                              



                          ?+--------------------                           



                          ---+------------------                           



                          ---+------------------                           



                          -------+| ?<15 (or                           



                          dialysis) ? ?| ?Stage                           



                          five ? ? ? ? | ? Stage                           



                          five ? ? ? ? ?                           



                          ?+--------------------                           



                          ---+------------------                           



                          ---+------------------                           



                          -------+ *Each stage                           



                          assumes the associated                           



                          GFR level has been in                           



                          effect for at least                           



                          three months. ?Stages                           



                          1 to 5, with or                           



                          without kidney                           



                          disease, indicate                           



                          chronic kidney                           



                          disease. Notes:                           



                          Determination of                           



                          stages one and two                           



                          (with eGFR                             



                          >59mL/min/1.73 m2)                           



                          requires estimation of                           



                          kidney damage for at                           



                          least three months as                           



                          defined by structural                           



                          or functional                           



                          abnormalities of the                           



                          kidney, manifested by                           



                          either:Pathological                           



                          abnormalities or                           



                          Markers of kidney                           



                          damage (including                           



                          abnormalities in the                           



                          composition of the                           



                          blood or urine or                           



                          abnormalities in                           



                          imaging tests).                           

 

             Lab Interpretation Abnormal                               



             (test code = 86275-3)                                        



[facility] with [id number] 12:23:51





             Test Item    Value        Reference Range Interpretation Comments

 

             WBC (test code =              See_Comment                [Automated



             5196-2)                                             message] The sy

stem



                                                                 which generated



                                                                 this result



                                                                 transmitted



                                                                 reference range

:



                                                                 4.20 - 10.70



                                                                 10*3/?L. The



                                                                 reference range

 was



                                                                 not used to



                                                                 interpret this



                                                                 result as



                                                                 normal/abnormal

.

 

             RBC (test code =              See_Comment                [Automated



             590-8)                                              message] The sy

stem



                                                                 which generated



                                                                 this result



                                                                 transmitted



                                                                 reference range

:



                                                                 4.26 - 5.52



                                                                 10*6/?L. The



                                                                 reference range

 was



                                                                 not used to



                                                                 interpret this



                                                                 result as



                                                                 normal/abnormal

.

 

             HGB (test code = 14.9 g/dL    12.2-16.4                 



             718-7)                                              

 

             HCT (test code = 44.2 %       38.4-49.3                 



             4544-3)                                             

 

             MCV (test code = 88.4 fL      81.7-95.6                 



             787-2)                                              

 

             MCH (test code = 29.8 pg      26.1-32.7                 



             785-6)                                              

 

             MCHC (test code = 33.7 g/dL    31.2-35.0                 



             786-4)                                              

 

             RDW-SD (test code = 49.3 fL      38.5-51.6                 



             64919-6)                                            

 

             RDW-CV (test code = 15.3 %       12.1-15.4                 



             788-0)                                              

 

             PLT (test code =              See_Comment  H             [Automated



             777-3)                                              message] The sy

stem



                                                                 which generated



                                                                 this result



                                                                 transmitted



                                                                 reference range

:



                                                                 150 - 328 10*3/

?L.



                                                                 The reference r

zhen



                                                                 was not used to



                                                                 interpret this



                                                                 result as



                                                                 normal/abnormal

.

 

             MPV (test code = 10.2 fL      9.8-13.0                  



             74425-0)                                            

 

             NRBC/100 WBC (test              See_Comment                [Automat

ed



             code = 3937383067)                                        message] 

The system



                                                                 which generated



                                                                 this result



                                                                 transmitted



                                                                 reference range

:



                                                                 0.0 - 10.0 /100



                                                                 WBCs. The refer

ence



                                                                 range was not u

sed



                                                                 to interpret th

is



                                                                 result as



                                                                 normal/abnormal

.

 

             NRBC x10^3 (test code <0.01        See_Comment                [Auto

mated



             = 3271495627)                                        message] The s

ystem



                                                                 which generated



                                                                 this result



                                                                 transmitted



                                                                 reference range

:



                                                                 10*3/?L. The



                                                                 reference range

 was



                                                                 not used to



                                                                 interpret this



                                                                 result as



                                                                 normal/abnormal

.

 

             GRAN MAT (NEUT) % 56.7 %                                 



             (test code = 770-8)                                        

 

             IMM GRAN % (test code 0.60 %                                 



             = 9939021808)                                        

 

             LYMPH % (test code = 31.1 %                                 



             736-9)                                              

 

             MONO % (test code = 8.7 %                                  



             5905-5)                                             

 

             EOS % (test code = 2.4 %                                  



             713-8)                                              

 

             BASO % (test code = 0.5 %                                  



             706-2)                                              

 

             GRAN MAT x10^3(ANC) 4.83 10*3/uL 1.99-6.95                 



             (test code =                                        



             4923849089)                                         

 

             IMM GRAN x10^3 (test 0.05 10*3/uL 0.00-0.06                 



             code = 3453944062)                                        

 

             LYMPH x10^3 (test code 2.64 10*3/uL 1.09-3.23                 



             = 731-0)                                            

 

             MONO x10^3 (test code 0.74 10*3/uL 0.36-1.02                 



             = 742-7)                                            

 

             EOS x10^3 (test code = 0.20 10*3/uL 0.06-0.53                 



             711-2)                                              

 

             BASO x10^3 (test code 0.04 10*3/uL 0.01-0.09                 



             = 704-7)                                            

 

             Lab Interpretation Abnormal                               



             (test code = 47024-9)                                        



[facility]. METABOLIC PANEL (04381)2022 
06:32:14





             Test Item    Value        Reference Range Interpretation Comments

 

             NA (test code = 139 mmol/L   135-145                   



             5904880518)                                         

 

             K (test code = 4.5 mmol/L   3.5-5.0                   



             8068647367)                                         

 

             CL (test code = 102 mmol/L                       



             9226054023)                                         

 

             CO2 TOTAL (test code = 26 mmol/L    23-31                     



             1661239107)                                         

 

             AGAP (test code =              2-16                      



             3786948367)                                         

 

             BUN (test code = 16 mg/dL     7-23                      



             3246630300)                                         

 

             GLUCOSE (test code = 99 mg/dL                         



             0653558455)                                         

 

             CREATININE (test code = 0.83 mg/dL   0.60-1.25                 



             6193457041)                                         

 

             TOTAL BILI (test code = 0.6 mg/dL    0.1-1.1                   



             8058056307)                                         

 

             CALCIUM (test code = 9.5 mg/dL    8.6-10.6                  



             0081530019)                                         

 

             T PROTEIN (test code = 8.6 g/dL     6.3-8.2      H            



             4309117163)                                         

 

             ALBUMIN (test code = 4.6 g/dL     3.5-5.0                   



             8101726845)                                         

 

             ALK PHOS (test code = 121 U/L                          



             1648527694)                                         

 

             ALTv (test code = 58 U/L       5-50         H            



             1742-6)                                             

 

             AST(SGOT) (test code = 32 U/L       13-40                     



             0425292093)                                         

 

             eGFR (test code =              mL/min/1.73m2              



             5711314346)                                         

 

             MAL (test code = MAL) Association of                           



                          Glomerular Filtration                           



                          Rate (GFR) and Staging                           



                          of Kidney Disease*                           



                          +---------------------                           



                          --+-------------------                           



                          --+-------------------                           



                          ------+| GFR                           



                          (mL/min/1.73 m2) ?|                           



                          With Kidney Damage ?|                           



                          ?Without Kidney                           



                          Damage+---------------                           



                          --------+-------------                           



                          --------+-------------                           



                          ------------+| ?>90 ?                           



                          ? ? ? ? ? ? ? ?|                           



                          ?Stage one ? ? ? ? ?|                           



                          ? Normal ? ? ? ? ? ? ?                           



                          ?+--------------------                           



                          ---+------------------                           



                          ---+------------------                           



                          -------+| ?60-89 ? ? ?                           



                          ? ? ? ? ?| ?Stage two                           



                          ? ? ? ? ?| ? Decreased                           



                          GFR ? ? ? ?                            



                          +---------------------                           



                          --+-------------------                           



                          --+-------------------                           



                          ------+| ?30-59 ? ? ?                           



                          ? ? ? ? ?| ?Stage                           



                          three ? ? ? ?| ? Stage                           



                          three ? ? ? ? ?                           



                          +---------------------                           



                          --+-------------------                           



                          --+-------------------                           



                          ------+| ?15-29 ? ? ?                           



                          ? ? ? ? ?| ?Stage four                           



                          ? ? ? ? | ? Stage four                           



                          ? ? ? ? ?                              



                          ?+--------------------                           



                          ---+------------------                           



                          ---+------------------                           



                          -------+| ?<15 (or                           



                          dialysis) ? ?| ?Stage                           



                          five ? ? ? ? | ? Stage                           



                          five ? ? ? ? ?                           



                          ?+--------------------                           



                          ---+------------------                           



                          ---+------------------                           



                          -------+ *Each stage                           



                          assumes the associated                           



                          GFR level has been in                           



                          effect for at least                           



                          three months. ?Stages                           



                          1 to 5, with or                           



                          without kidney                           



                          disease, indicate                           



                          chronic kidney                           



                          disease. Notes:                           



                          Determination of                           



                          stages one and two                           



                          (with eGFR                             



                          >59mL/min/1.73 m2)                           



                          requires estimation of                           



                          kidney damage for at                           



                          least three months as                           



                          defined by structural                           



                          or functional                           



                          abnormalities of the                           



                          kidney, manifested by                           



                          either:Pathological                           



                          abnormalities or                           



                          Markers of kidney                           



                          damage (including                           



                          abnormalities in the                           



                          composition of the                           



                          blood or urine or                           



                          abnormalities in                           



                          imaging tests).                           

 

             Lab Interpretation Abnormal                               



             (test code = 08152-0)                                        



[facility] WITH [id number] 05:48:31





             Test Item    Value        Reference Range Interpretation Comments

 

             WBC (test code =              See_Comment  H             [Automated



             6252-2)                                             message] The sy

stem



                                                                 which generated



                                                                 this result



                                                                 transmitted



                                                                 reference range

:



                                                                 4.20 - 10.70



                                                                 10*3/?L. The



                                                                 reference range

 was



                                                                 not used to



                                                                 interpret this



                                                                 result as



                                                                 normal/abnormal

.

 

             RBC (test code =              See_Comment  H             [Automated



             919-8)                                              message] The sy

stem



                                                                 which generated



                                                                 this result



                                                                 transmitted



                                                                 reference range

:



                                                                 4.26 - 5.52



                                                                 10*6/?L. The



                                                                 reference range

 was



                                                                 not used to



                                                                 interpret this



                                                                 result as



                                                                 normal/abnormal

.

 

             HGB (test code = 17.3 g/dL    12.2-16.4    H            



             718-7)                                              

 

             HCT (test code = 51.4 %       38.4-49.3    H            



             4544-3)                                             

 

             MCV (test code = 87.7 fL      81.7-95.6                 



             787-2)                                              

 

             MCH (test code = 29.5 pg      26.1-32.7                 



             785-6)                                              

 

             MCHC (test code = 33.7 g/dL    31.2-35.0                 



             786-4)                                              

 

             RDW-SD (test code = 49.1 fL      38.5-51.6                 



             69654-8)                                            

 

             RDW-CV (test code = 15.5 %       12.1-15.4    H            



             788-0)                                              

 

             PLT (test code =              See_Comment  H             [Automated



             777-3)                                              message] The sy

stem



                                                                 which generated



                                                                 this result



                                                                 transmitted



                                                                 reference range

:



                                                                 150 - 328 10*3/

?L.



                                                                 The reference r

zhen



                                                                 was not used to



                                                                 interpret this



                                                                 result as



                                                                 normal/abnormal

.

 

             MPV (test code = 10.4 fL      9.8-13.0                  



             98792-8)                                            

 

             NRBC/100 WBC (test              See_Comment                [Automat

ed



             code = 4817236009)                                        message] 

The system



                                                                 which generated



                                                                 this result



                                                                 transmitted



                                                                 reference range

:



                                                                 0.0 - 10.0 /100



                                                                 WBCs. The refer

ence



                                                                 range was not u

sed



                                                                 to interpret th

is



                                                                 result as



                                                                 normal/abnormal

.

 

             NRBC x10^3 (test code <0.01        See_Comment                [Auto

mated



             = [id number])                                        message] The s

ystem



                                                                 which generated



                                                                 this result



                                                                 transmitted



                                                                 reference range

:



                                                                 10*3/?L. The



                                                                 reference range

 was



                                                                 not used to



                                                                 interpret this



                                                                 result as



                                                                 normal/abnormal

.

 

             GRAN MAT (NEUT) % 64.8 %                                 



             (test code = 770-8)                                        

 

             IMM GRAN % (test code 0.70 %                                 



             = 0916369903)                                        

 

             LYMPH % (test code = 25.2 %                                 



             736-9)                                              

 

             MONO % (test code = 6.9 %                                  



             5905-5)                                             

 

             EOS % (test code = 1.9 %                                  



             713-8)                                              

 

             BASO % (test code = 0.5 %                                  



             706-2)                                              

 

             GRAN MAT x10^3(ANC) 7.80 10*3/uL 1.99-6.95    H            



             (test code =                                        



             1451182512)                                         

 

             IMM GRAN x10^3 (test 0.08 10*3/uL 0.00-0.06    H            



             code = 0424499315)                                        

 

             LYMPH x10^3 (test code 3.04 10*3/uL 1.09-3.23                 



             = 731-0)                                            

 

             MONO x10^3 (test code 0.83 10*3/uL 0.36-1.02                 



             = 742-7)                                            

 

             EOS x10^3 (test code = 0.23 10*3/uL 0.06-0.53                 



             711-2)                                              

 

             BASO x10^3 (test code 0.06 10*3/uL 0.01-0.09                 



             = 704-7)                                            

 

             Lab Interpretation Abnormal                               



             (test code = 75786-1)                                        



[facility]. METABOLIC PANEL ([id number] 
02:19:08





             Test Item    Value        Reference Range Interpretation Comments

 

             NA (test code = 136 mmol/L   135-145                   



             8550508231)                                         

 

             K (test code = 4.4 mmol/L   3.5-5.0                   



             8546085604)                                         

 

             CL (test code = 99 mmol/L                        



             8518871813)                                         

 

             CO2 TOTAL (test code = 25 mmol/L    23-31                     



             1075337364)                                         

 

             AGAP (test code =              2-16                      



             5073618424)                                         

 

             BUN (test code = 19 mg/dL     7-23                      



             1878346115)                                         

 

             GLUCOSE (test code = 103 mg/dL                        



             7968451972)                                         

 

             CREATININE (test code = 0.70 mg/dL   0.60-1.25                 



             6017535348)                                         

 

             TOTAL BILI (test code = 0.7 mg/dL    0.1-1.1                   



             9389655227)                                         

 

             CALCIUM (test code = 9.2 mg/dL    8.6-10.6                  



             1054206820)                                         

 

             T PROTEIN (test code = 9.4 g/dL     6.3-8.2      H            



             0633111322)                                         

 

             ALBUMIN (test code = 4.8 g/dL     3.5-5.0                   



             5934410311)                                         

 

             ALK PHOS (test code = 146 U/L             H            



             7872712046)                                         

 

             ALTv (test code = 75 U/L       5-50         H            



             1742-6)                                             

 

             AST(SGOT) (test code = 37 U/L       13-40                     



             6197767527)                                         

 

             eGFR (test code =              mL/min/1.73m2              



             9740404793)                                         

 

             MAL (test code = MAL) Association of                           



                          Glomerular Filtration                           



                          Rate (GFR) and Staging                           



                          of Kidney Disease*                           



                          +---------------------                           



                          --+-------------------                           



                          --+-------------------                           



                          ------+| GFR                           



                          (mL/min/1.73 m2) ?|                           



                          With Kidney Damage ?|                           



                          ?Without Kidney                           



                          Damage+---------------                           



                          --------+-------------                           



                          --------+-------------                           



                          ------------+| ?>90 ?                           



                          ? ? ? ? ? ? ? ?|                           



                          ?Stage one ? ? ? ? ?|                           



                          ? Normal ? ? ? ? ? ? ?                           



                          ?+--------------------                           



                          ---+------------------                           



                          ---+------------------                           



                          -------+| ?60-89 ? ? ?                           



                          ? ? ? ? ?| ?Stage two                           



                          ? ? ? ? ?| ? Decreased                           



                          GFR ? ? ? ?                            



                          +---------------------                           



                          --+-------------------                           



                          --+-------------------                           



                          ------+| ?30-59 ? ? ?                           



                          ? ? ? ? ?| ?Stage                           



                          three ? ? ? ?| ? Stage                           



                          three ? ? ? ? ?                           



                          +---------------------                           



                          --+-------------------                           



                          --+-------------------                           



                          ------+| ?15-29 ? ? ?                           



                          ? ? ? ? ?| ?Stage four                           



                          ? ? ? ? | ? Stage four                           



                          ? ? ? ? ?                              



                          ?+--------------------                           



                          ---+------------------                           



                          ---+------------------                           



                          -------+| ?<15 (or                           



                          dialysis) ? ?| ?Stage                           



                          five ? ? ? ? | ? Stage                           



                          five ? ? ? ? ?                           



                          ?+--------------------                           



                          ---+------------------                           



                          ---+------------------                           



                          -------+ *Each stage                           



                          assumes the associated                           



                          GFR level has been in                           



                          effect for at least                           



                          three months. ?Stages                           



                          1 to 5, with or                           



                          without kidney                           



                          disease, indicate                           



                          chronic kidney                           



                          disease. Notes:                           



                          Determination of                           



                          stages one and two                           



                          (with eGFR                             



                          >59mL/min/1.73 m2)                           



                          requires estimation of                           



                          kidney damage for at                           



                          least three months as                           



                          defined by structural                           



                          or functional                           



                          abnormalities of the                           



                          kidney, manifested by                           



                          either:Pathological                           



                          abnormalities or                           



                          Markers of kidney                           



                          damage (including                           



                          abnormalities in the                           



                          composition of the                           



                          blood or urine or                           



                          abnormalities in                           



                          imaging tests).                           

 

             Lab Interpretation Abnormal                               



             (test code = 44958-9)                                        



[facility]





             Test Item    Value        Reference Range Interpretation Comments

 

             LIPASE (test code = 9481660400) 86 U/L       0-220                 

    

 

             Lab Interpretation (test code = Normal                             

    



             05658-7)                                            



[id number]55:49





             Test Item    Value        Reference Range Interpretation Comments

 

             WBC (test code =              See_Comment  H             [Automated



             2695-2)                                             message] The sy

stem



                                                                 which generated



                                                                 this result



                                                                 transmitted



                                                                 reference range

:



                                                                 4.20 - 10.70



                                                                 10*3/?L. The



                                                                 reference range

 was



                                                                 not used to



                                                                 interpret this



                                                                 result as



                                                                 normal/abnormal

.

 

             RBC (test code =              See_Comment  H             [Automated



             786-8)                                              message] The sy

stem



                                                                 which generated



                                                                 this result



                                                                 transmitted



                                                                 reference range

:



                                                                 4.26 - 5.52



                                                                 10*6/?L. The



                                                                 reference range

 was



                                                                 not used to



                                                                 interpret this



                                                                 result as



                                                                 normal/abnormal

.

 

             HGB (test code = 18.0 g/dL    12.2-16.4    H            



             718-7)                                              

 

             HCT (test code = 53.9 %       38.4-49.3    H            



             4544-3)                                             

 

             MCV (test code = 87.2 fL      81.7-95.6                 



             787-2)                                              

 

             MCH (test code = 29.1 pg      26.1-32.7                 



             785-6)                                              

 

             MCHC (test code = 33.4 g/dL    31.2-35.0                 



             786-4)                                              

 

             RDW-SD (test code = 48.7 fL      38.5-51.6                 



             07642-3)                                            

 

             RDW-CV (test code = 15.4 %       12.1-15.4                 



             788-0)                                              

 

             PLT (test code =              See_Comment  H             [Automated



             777-3)                                              message] The sy

stem



                                                                 which generated



                                                                 this result



                                                                 transmitted



                                                                 reference range

:



                                                                 150 - 328 10*3/

?L.



                                                                 The reference r

zhen



                                                                 was not used to



                                                                 interpret this



                                                                 result as



                                                                 normal/abnormal

.

 

             MPV (test code = 9.9 fL       9.8-13.0                  



             18599-3)                                            

 

             NRBC/100 WBC (test              See_Comment                [Automat

ed



             code = 4075447601)                                        message] 

The system



                                                                 which generated



                                                                 this result



                                                                 transmitted



                                                                 reference range

:



                                                                 0.0 - 10.0 /100



                                                                 WBCs. The refer

ence



                                                                 range was not u

sed



                                                                 to interpret th

is



                                                                 result as



                                                                 normal/abnormal

.

 

             NRBC x10^3 (test code <0.01        See_Comment                [Auto

mated



             = [id number])                                        message] The s

ystem



                                                                 which generated



                                                                 this result



                                                                 transmitted



                                                                 reference range

:



                                                                 10*3/?L. The



                                                                 reference range

 was



                                                                 not used to



                                                                 interpret this



                                                                 result as



                                                                 normal/abnormal

.

 

             GRAN MAT (NEUT) % 69.8 %                                 



             (test code = 770-8)                                        

 

             IMM GRAN % (test code 0.50 %                                 



             = [id number])                                        

 

             LYMPH % (test code = 20.5 %                                 



             736-9)                                              

 

             MONO % (test code = 6.8 %                                  



             5905-5)                                             

 

             EOS % (test code = 1.9 %                                  



             713-8)                                              

 

             BASO % (test code = 0.5 %                                  



             706-2)                                              

 

             GRAN MAT x10^3(ANC) 7.72 10*3/uL 1.99-6.95    H            



             (test code =                                        



             1228360415)                                         

 

             IMM GRAN x10^3 (test 0.05 10*3/uL 0.00-0.06                 



             code = [id number])                                        

 

             LYMPH x10^3 (test code 2.26 10*3/uL 1.09-3.23                 



             = 731-0)                                            

 

             MONO x10^3 (test code 0.75 10*3/uL 0.36-1.02                 



             = 742-7)                                            

 

             EOS x10^3 (test code = 0.21 10*3/uL 0.06-0.53                 



             711-2)                                              

 

             BASO x10^3 (test code 0.06 10*3/uL 0.01-0.09                 



             = 704-7)                                            

 

             Lab Interpretation Abnormal                               



             (test code = 30323-9)                                        



[id number]





             Test Item    Value        Reference    Interpretation Comments



                                       Range                     

 

             D-DIMER (test code =              See_Comment                [Autom

ated



             [id number])                                         message] The



                                                                 system which



                                                                 generated this



                                                                 result



                                                                 transmitted



                                                                 reference range

:



                                                                 <0.41 ?g/mL



                                                                 (FEU). The



                                                                 reference range



                                                                 was not used to



                                                                 interpret this



                                                                 result as



                                                                 normal/abnormal

.

 

             MAL (test code = This test may be                           



             MAL)         used in conjunction                           



                          with a clinical                           



                          pretest probability                           



                          (PTP) assessment                           



                          model to exclude                           



                          venous                                 



                          thromboembolism                           



                          (VTE) in patients                           



                          suspected of deep                           



                          venous thrombosis                           



                          (DVT) and pulmonary                           



                          embolism (PE)  A                           



                          D-Dimer value less                           



                          than 0.50 ?g/ml                           



                          (FEU) has a negative                           



                          predicative value of                           



                          96 to 100% (95%                           



                          CI)and 97 to 100%                           



                          (95% CI) as an aid                           



                          in the diagnosis of                           



                          deep vein thrombosis                           



                          (DVT) and pulmonary                           



                          embolism when there                           



                          is low or moderate                           



                          pretest probability                           



                          of PE or DVT.                           



                          D-Dimer values are                           



                          expressed in initial                           



                          fibrinogen                             



                          equivalent units                           



                          (FEU)" The assay                           



                          results should be                           



                          used with other                           



                          information,                           



                          including the                           



                          clinical context, in                           



                          forming a diagnosis.                           



                                                                 

 

             Lab Interpretation Normal                                 



             (test code =                                        



             61719-1)                                            



[id number] 
22:42:48





             Test Item    Value        Reference Range Interpretation Comments

 

             NA (test code = 135 mmol/L   135-145                   



             4898533113)                                         

 

             K (test code = 4.6 mmol/L   3.5-5.0                   



             8045376667)                                         

 

             CL (test code = 102 mmol/L                       



             9092917622)                                         

 

             CO2 TOTAL (test code = 24 mmol/L    23-31                     



             1381475460)                                         

 

             AGAP (test code =              2-16                      



             6924082883)                                         

 

             BUN (test code = 17 mg/dL     7-23                      



             3920765423)                                         

 

             GLUCOSE (test code = 107 mg/dL                        



             0535230804)                                         

 

             CREATININE (test code = 0.60 mg/dL   0.60-1.25                 



             4141361208)                                         

 

             TOTAL BILI (test code = 1.0 mg/dL    0.1-1.1                   



             7492574674)                                         

 

             CALCIUM (test code = 9.4 mg/dL    8.6-10.6                  



             4282562211)                                         

 

             T PROTEIN (test code = 8.6 g/dL     6.3-8.2      H            



             6375091486)                                         

 

             ALBUMIN (test code = 4.6 g/dL     3.5-5.0                   



             2994617638)                                         

 

             ALK PHOS (test code = 159 U/L             H            



             7974103486)                                         

 

             ALTv (test code = 77 U/L       5-50         H            



             2-6)                                             

 

             AST(SGOT) (test code = 38 U/L       13-40                     



             7230911294)                                         

 

             eGFR (test code =              mL/min/1.73m2              



             7519429519)                                         

 

             MAL (test code = MAL) Association of                           



                          Glomerular Filtration                           



                          Rate (GFR) and Staging                           



                          of Kidney Disease*                           



                          +---------------------                           



                          --+-------------------                           



                          --+-------------------                           



                          ------+| GFR                           



                          (mL/min/1.73 m2) ?|                           



                          With Kidney Damage ?|                           



                          ?Without Kidney                           



                          Damage+---------------                           



                          --------+-------------                           



                          --------+-------------                           



                          ------------+| ?>90 ?                           



                          ? ? ? ? ? ? ? ?|                           



                          ?Stage one ? ? ? ? ?|                           



                          ? Normal ? ? ? ? ? ? ?                           



                          ?+--------------------                           



                          ---+------------------                           



                          ---+------------------                           



                          -------+| ?60-89 ? ? ?                           



                          ? ? ? ? ?| ?Stage two                           



                          ? ? ? ? ?| ? Decreased                           



                          GFR ? ? ? ?                            



                          +---------------------                           



                          --+-------------------                           



                          --+-------------------                           



                          ------+| ?30-59 ? ? ?                           



                          ? ? ? ? ?| ?Stage                           



                          three ? ? ? ?| ? Stage                           



                          three ? ? ? ? ?                           



                          +---------------------                           



                          --+-------------------                           



                          --+-------------------                           



                          ------+| ?15-29 ? ? ?                           



                          ? ? ? ? ?| ?Stage four                           



                          ? ? ? ? | ? Stage four                           



                          ? ? ? ? ?                              



                          ?+--------------------                           



                          ---+------------------                           



                          ---+------------------                           



                          -------+| ?<15 (or                           



                          dialysis) ? ?| ?Stage                           



                          five ? ? ? ? | ? Stage                           



                          five ? ? ? ? ?                           



                          ?+--------------------                           



                          ---+------------------                           



                          ---+------------------                           



                          -------+ *Each stage                           



                          assumes the associated                           



                          GFR level has been in                           



                          effect for at least                           



                          three months. ?Stages                           



                          1 to 5, with or                           



                          without kidney                           



                          disease, indicate                           



                          chronic kidney                           



                          disease. Notes:                           



                          Determination of                           



                          stages one and two                           



                          (with eGFR                             



                          >59mL/min/1.73 m2)                           



                          requires estimation of                           



                          kidney damage for at                           



                          least three months as                           



                          defined by structural                           



                          or functional                           



                          abnormalities of the                           



                          kidney, manifested by                           



                          either:Pathological                           



                          abnormalities or                           



                          Markers of kidney                           



                          damage (including                           



                          abnormalities in the                           



                          composition of the                           



                          blood or urine or                           



                          abnormalities in                           



                          imaging tests).                           

 

             Lab Interpretation Abnormal                               



             (test code = 24536-7)                                        



[facility] WITH DIFF2022-01-15 22:30:27





             Test Item    Value        Reference Range Interpretation Comments

 

             WBC (test code =              See_Comment  H             [Automated



             6690-2)                                             message] The



                                                                 system which



                                                                 generated this



                                                                 result transmit

huma



                                                                 reference range

:



                                                                 4.20 - 10.70



                                                                 10*3/?L. The



                                                                 reference range



                                                                 was not used to



                                                                 interpret this



                                                                 result as



                                                                 normal/abnormal

.

 

             RBC (test code =              See_Comment  H             [Automated



             789-8)                                              message] The



                                                                 system which



                                                                 generated this



                                                                 result transmit

huma



                                                                 reference range

:



                                                                 4.26 - 5.52



                                                                 10*6/?L. The



                                                                 reference range



                                                                 was not used to



                                                                 interpret this



                                                                 result as



                                                                 normal/abnormal

.

 

             HGB (test code = 17.5 g/dL    12.2-16.4    H            



             718-7)                                              

 

             HCT (test code = 51.0 %       38.4-49.3    H            



             4544-3)                                             

 

             MCV (test code = 86.7 fL      81.7-95.6                 



             787-2)                                              

 

             MCH (test code = 29.8 pg      26.1-32.7                 



             785-6)                                              

 

             MCHC (test code = 34.3 g/dL    31.2-35.0                 



             786-4)                                              

 

             RDW-SD (test code = 48.0 fL      38.5-51.6                 



             50882-6)                                            

 

             RDW-CV (test code = 15.1 %       12.1-15.4                 



             788-0)                                              

 

             PLT (test code =              See_Comment  H             [Automated



             777-3)                                              message] The



                                                                 system which



                                                                 generated this



                                                                 result transmit

huma



                                                                 reference range

:



                                                                 150 - 328 10*3/

?L.



                                                                 The reference



                                                                 range was not u

sed



                                                                 to interpret th

is



                                                                 result as



                                                                 normal/abnormal

.

 

             MPV (test code = 10.3 fL      9.8-13.0                  



             64718-1)                                            

 

             NRBC/100 WBC (test              See_Comment                [Automat

ed



             code = 4189104617)                                        message] 

The



                                                                 system which



                                                                 generated this



                                                                 result transmit

huma



                                                                 reference range

:



                                                                 0.0 - 10.0 /100



                                                                 WBCs. The



                                                                 reference range



                                                                 was not used to



                                                                 interpret this



                                                                 result as



                                                                 normal/abnormal

.

 

             NRBC x10^3 (test code <0.01        See_Comment                [Auto

mated



             = 3974902256)                                        message] The



                                                                 system which



                                                                 generated this



                                                                 result transmit

huma



                                                                 reference range

:



                                                                 10*3/?L. The



                                                                 reference range



                                                                 was not used to



                                                                 interpret this



                                                                 result as



                                                                 normal/abnormal

.

 

             GRAN MAT (NEUT) % 79.9 %                                 



             (test code = 770-8)                                        

 

             IMM GRAN % (test code 0.50 %                                 



             = 1611696586)                                        

 

             LYMPH % (test code = 11.8 %                                 



             736-9)                                              

 

             MONO % (test code = 6.6 %                                  



             5905-5)                                             

 

             EOS % (test code = 0.9 %                                  



             713-8)                                              

 

             BASO % (test code = 0.3 %                                  



             706-2)                                              

 

             GRAN MAT x10^3(ANC) 10.70 10*3/uL 1.99-6.95    H            



             (test code =                                        



             9415182896)                                         

 

             IMM GRAN x10^3 (test 0.07 10*3/uL 0.00-0.06    H            



             code = 1496520745)                                        

 

             LYMPH x10^3 (test code 1.58 10*3/uL 1.09-3.23                 



             = 731-0)                                            

 

             MONO x10^3 (test code 0.89 10*3/uL 0.36-1.02                 



             = 742-7)                                            

 

             EOS x10^3 (test code = 0.12 10*3/uL 0.06-0.53                 



             711-2)                                              

 

             BASO x10^3 (test code 0.04 10*3/uL 0.01-0.09                 



             = 704-7)                                            

 

             Lab Interpretation Abnormal                               



             (test code = 67926-2)                                        



[facility] [place]. METABOLIC PANEL ([id number])2021 
23:02:15





             Test Item    Value        Reference Range Interpretation Comments

 

             NA (test code = 137 mmol/L   135-145                   



             5497890691)                                         

 

             K (test code = 4.5 mmol/L   3.5-5.0                   



             4245321005)                                         

 

             CL (test code = 109 mmol/L          H            



             8577695030)                                         

 

             CO2 TOTAL (test code = 22 mmol/L    23-31        L            



             8081777482)                                         

 

             AGAP (test code =              2-16                      



             0454732380)                                         

 

             BUN (test code = 7 mg/dL      7-23                      



             2545645996)                                         

 

             GLUCOSE (test code = 87 mg/dL                         



             0123078545)                                         

 

             CREATININE (test code = 0.61 mg/dL   0.60-1.25                 



             9065829727)                                         

 

             TOTAL BILI (test code = 0.5 mg/dL    0.1-1.1                   



             1376408821)                                         

 

             CALCIUM (test code = 9.0 mg/dL    8.6-10.6                  



             2612401654)                                         

 

             T PROTEIN (test code = 6.9 g/dL     6.3-8.2                   



             7404009909)                                         

 

             ALBUMIN (test code = 3.5 g/dL     3.5-5.0                   



             5563535476)                                         

 

             ALK PHOS (test code = 112 U/L                          



             8653179801)                                         

 

             ALTv (test code = 35 U/L       5-50                      



             1742-6)                                             

 

             AST(SGOT) (test code = 21 U/L       13-40                     



             8801289886)                                         

 

             eGFR (test code =              mL/min/1.73m2              



             5921815308)                                         

 

             MAL (test code = MAL) Association of                           



                          Glomerular Filtration                           



                          Rate (GFR) and Staging                           



                          of Kidney Disease*                           



                          +---------------------                           



                          --+-------------------                           



                          --+-------------------                           



                          ------+| GFR                           



                          (mL/min/1.73 m2) ?|                           



                          With Kidney Damage ?|                           



                          ?Without Kidney                           



                          Damage+---------------                           



                          --------+-------------                           



                          --------+-------------                           



                          ------------+| ?>90 ?                           



                          ? ? ? ? ? ? ? ?|                           



                          ?Stage one ? ? ? ? ?|                           



                          ? Normal ? ? ? ? ? ? ?                           



                          ?+--------------------                           



                          ---+------------------                           



                          ---+------------------                           



                          -------+| ?60-89 ? ? ?                           



                          ? ? ? ? ?| ?Stage two                           



                          ? ? ? ? ?| ? Decreased                           



                          GFR ? ? ? ?                            



                          +---------------------                           



                          --+-------------------                           



                          --+-------------------                           



                          ------+| ?30-59 ? ? ?                           



                          ? ? ? ? ?| ?Stage                           



                          three ? ? ? ?| ? Stage                           



                          three ? ? ? ? ?                           



                          +---------------------                           



                          --+-------------------                           



                          --+-------------------                           



                          ------+| ?15-29 ? ? ?                           



                          ? ? ? ? ?| ?Stage four                           



                          ? ? ? ? | ? Stage four                           



                          ? ? ? ? ?                              



                          ?+--------------------                           



                          ---+------------------                           



                          ---+------------------                           



                          -------+| ?<15 (or                           



                          dialysis) ? ?| ?Stage                           



                          five ? ? ? ? | ? Stage                           



                          five ? ? ? ? ?                           



                          ?+--------------------                           



                          ---+------------------                           



                          ---+------------------                           



                          -------+ *Each stage                           



                          assumes the associated                           



                          GFR level has been in                           



                          effect for at least                           



                          three months. ?Stages                           



                          1 to 5, with or                           



                          without kidney                           



                          disease, indicate                           



                          chronic kidney                           



                          disease. Notes:                           



                          Determination of                           



                          stages one and two                           



                          (with eGFR                             



                          >59mL/min/1.73 m2)                           



                          requires estimation of                           



                          kidney damage for at                           



                          least three months as                           



                          defined by structural                           



                          or functional                           



                          abnormalities of the                           



                          kidney, manifested by                           



                          either:Pathological                           



                          abnormalities or                           



                          Markers of kidney                           



                          damage (including                           



                          abnormalities in the                           



                          composition of the                           



                          blood or urine or                           



                          abnormalities in                           



                          imaging tests).                           

 

             Lab Interpretation Abnormal                               



             (test code = 48684-6)                                        



[facility] WITH [id number] 22:51:57





             Test Item    Value        Reference Range Interpretation Comments

 

             WBC (test code =              See_Comment  H             [Automated



             6690-2)                                             message] The sy

stem



                                                                 which generated



                                                                 this result



                                                                 transmitted



                                                                 reference range

:



                                                                 4.20 - 10.70



                                                                 10*3/?L. The



                                                                 reference range

 was



                                                                 not used to



                                                                 interpret this



                                                                 result as



                                                                 normal/abnormal

.

 

             RBC (test code =              See_Comment                [Automated



             789-8)                                              message] The sy

stem



                                                                 which generated



                                                                 this result



                                                                 transmitted



                                                                 reference range

:



                                                                 4.26 - 5.52



                                                                 10*6/?L. The



                                                                 reference range

 was



                                                                 not used to



                                                                 interpret this



                                                                 result as



                                                                 normal/abnormal

.

 

             HGB (test code = 14.7 g/dL    12.2-16.4                 



             718-7)                                              

 

             HCT (test code = 43.7 %       38.4-49.3                 



             4544-3)                                             

 

             MCV (test code = 85.9 fL      81.7-95.6                 



             787-2)                                              

 

             MCH (test code = 28.9 pg      26.1-32.7                 



             785-6)                                              

 

             MCHC (test code = 33.6 g/dL    31.2-35.0                 



             786-4)                                              

 

             RDW-SD (test code = 42.4 fL      38.5-51.6                 



             10377-9)                                            

 

             RDW-CV (test code = 13.6 %       12.1-15.4                 



             788-0)                                              

 

             PLT (test code =              See_Comment  H             [Automated



             777-3)                                              message] The sy

stem



                                                                 which generated



                                                                 this result



                                                                 transmitted



                                                                 reference range

:



                                                                 150 - 328 10*3/

?L.



                                                                 The reference r

zhen



                                                                 was not used to



                                                                 interpret this



                                                                 result as



                                                                 normal/abnormal

.

 

             MPV (test code = 9.4 fL       9.8-13.0     L            



             96630-3)                                            

 

             NRBC/100 WBC (test              See_Comment                [Automat

ed



             code = 8580946958)                                        message] 

The system



                                                                 which generated



                                                                 this result



                                                                 transmitted



                                                                 reference range

:



                                                                 0.0 - 10.0 /100



                                                                 WBCs. The refer

ence



                                                                 range was not u

sed



                                                                 to interpret th

is



                                                                 result as



                                                                 normal/abnormal

.

 

             NRBC x10^3 (test code <0.01        See_Comment                [Auto

mated



             = 4890201357)                                        message] The s

ystem



                                                                 which generated



                                                                 this result



                                                                 transmitted



                                                                 reference range

:



                                                                 10*3/?L. The



                                                                 reference range

 was



                                                                 not used to



                                                                 interpret this



                                                                 result as



                                                                 normal/abnormal

.

 

             GRAN MAT (NEUT) % 76.4 %                                 



             (test code = 770-8)                                        

 

             IMM GRAN % (test code 0.40 %                                 



             = 1359136235)                                        

 

             LYMPH % (test code = 16.3 %                                 



             736-9)                                              

 

             MONO % (test code = 5.6 %                                  



             5905-5)                                             

 

             EOS % (test code = 1.0 %                                  



             713-8)                                              

 

             BASO % (test code = 0.3 %                                  



             706-2)                                              

 

             GRAN MAT x10^3(ANC) 8.81 10*3/uL 1.99-6.95    H            



             (test code =                                        



             3660561859)                                         

 

             IMM GRAN x10^3 (test 0.05 10*3/uL 0.00-0.06                 



             code = 8631981224)                                        

 

             LYMPH x10^3 (test code 1.88 10*3/uL 1.09-3.23                 



             = 731-0)                                            

 

             MONO x10^3 (test code 0.64 10*3/uL 0.36-1.02                 



             = 742-7)                                            

 

             EOS x10^3 (test code = 0.12 10*3/uL 0.06-0.53                 



             711-2)                                              

 

             BASO x10^3 (test code 0.03 10*3/uL 0.01-0.09                 



             = 704-7)                                            

 

             Lab Interpretation Abnormal                               



             (test code = 65443-6)                                        



[facility]01:[facility]





             Test Item    Value        Reference Range Interpretation Comments

 

             SARS-CoV-2 Rapid ID NOW Not Detected Not Detected              



             (test code = 30888-6)                                        

 

             MAL (test code = MAL) ID NOW COVID-19 Assay                        

   



                          is an isothermal                           



                          nucleic acid                           



                          amplification test                           



                          intended for the                           



                          qualitative detection                           



                          of nucleic acid from                           



                          SARS-CoV-2 viral RNA                           



                          in nasopharyngeal (NP)                           



                          specimens. It is used                           



                          under Emergency Use                           



                          Authorization (EUA) by                           



                          FDA. The limit of                           



                          detection (LOD) of the                           



                          assay is 125 Genome                           



                          Equivalents/mL. A                           



                          positive result is                           



                          indicative of the                           



                          presence of SARS-CoV-2                           



                          RNA. ?Clinical                           



                          correlation with                           



                          patient history and                           



                          other diagnostic                           



                          information is                           



                          necessary to determine                           



                          patient infection                           



                          status. A negative                           



                          (Not Detected) result                           



                          does not preclude                           



                          SARS-CoV-2 infection.                           



                          In patients with                           



                          clinical symptoms and                           



                          other tests that are                           



                          consistent with                           



                          SARS-CoV-2 infection,                           



                          negative results                           



                          should be treated as                           



                          presumptive negative                           



                          and a new specimen                           



                          should be tested with                           



                          alternative PCR                           



                          molecular test.                           



                          Invalid: Please                           



                          collect a new specimen                           



                          for repeat patient                           



                          testing if clinically                           



                          indicated.                             

 

             Lab Interpretation Normal                                 



             (test code = 24509-3)                                        



[facility] [place]. METABOLIC PANEL ([id number])2021-05-10 
01:00:33





             Test Item    Value        Reference Range Interpretation Comments

 

             NA (test code = 140 mmol/L   135-145                   



             3959524697)                                         

 

             K (test code = 4.4 mmol/L   3.5-5.0                   



             0207812414)                                         

 

             CL (test code = 103 mmol/L                       



             9332592578)                                         

 

             CO2 TOTAL (test code = 28 mmol/L    23-31                     



             2525103477)                                         

 

             AGAP (test code =              2-16                      



             [id number])                                         

 

             BUN (test code = 15 mg/dL     7-23                      



             6548174313)                                         

 

             GLUCOSE (test code = 85 mg/dL                         



             7497547398)                                         

 

             CREATININE (test code = 0.60 mg/dL   0.60-1.25                 



             2890333409)                                         

 

             TOTAL BILI (test code = 1.1 mg/dL    0.1-1.1                   



             5251051648)                                         

 

             CALCIUM (test code = 9.0 mg/dL    8.6-10.6                  



             7469597465)                                         

 

             T PROTEIN (test code = 7.8 g/dL     6.3-8.2                   



             8063553431)                                         

 

             ALBUMIN (test code = 4.0 g/dL     3.5-5.0                   



             4094106897)                                         

 

             ALK PHOS (test code = 166 U/L             H            



             4129351400)                                         

 

             ALTv (test code = 42 U/L       5-50                      



             1742-6)                                             

 

             AST(SGOT) (test code = 32 U/L       13-40                     



             2228899193)                                         

 

             eGFR (test code =              mL/min/1.73m2              



             5602197729)                                         

 

             MAL (test code = MAL) Association of                           



                          Glomerular Filtration                           



                          Rate (GFR) and Staging                           



                          of Kidney Disease*                           



                          +---------------------                           



                          --+-------------------                           



                          --+-------------------                           



                          ------+| GFR                           



                          (mL/min/1.73 m2) ?|                           



                          With Kidney Damage ?|                           



                          ?Without Kidney                           



                          Damage+---------------                           



                          --------+-------------                           



                          --------+-------------                           



                          ------------+| ?>90 ?                           



                          ? ? ? ? ? ? ? ?|                           



                          ?Stage one ? ? ? ? ?|                           



                          ? Normal ? ? ? ? ? ? ?                           



                          ?+--------------------                           



                          ---+------------------                           



                          ---+------------------                           



                          -------+| ?60-89 ? ? ?                           



                          ? ? ? ? ?| ?Stage two                           



                          ? ? ? ? ?| ? Decreased                           



                          GFR ? ? ? ?                            



                          +---------------------                           



                          --+-------------------                           



                          --+-------------------                           



                          ------+| ?30-59 ? ? ?                           



                          ? ? ? ? ?| ?Stage                           



                          three ? ? ? ?| ? Stage                           



                          three ? ? ? ? ?                           



                          +---------------------                           



                          --+-------------------                           



                          --+-------------------                           



                          ------+| ?15-29 ? ? ?                           



                          ? ? ? ? ?| ?Stage four                           



                          ? ? ? ? | ? Stage four                           



                          ? ? ? ? ?                              



                          ?+--------------------                           



                          ---+------------------                           



                          ---+------------------                           



                          -------+| ?<15 (or                           



                          dialysis) ? ?| ?Stage                           



                          five ? ? ? ? | ? Stage                           



                          five ? ? ? ? ?                           



                          ?+--------------------                           



                          ---+------------------                           



                          ---+------------------                           



                          -------+ *Each stage                           



                          assumes the associated                           



                          GFR level has been in                           



                          effect for at least                           



                          three months. ?Stages                           



                          1 to 5, with or                           



                          without kidney                           



                          disease, indicate                           



                          chronic kidney                           



                          disease. Notes:                           



                          Determination of                           



                          stages one and two                           



                          (with eGFR                             



                          >59mL/min/1.73 m2)                           



                          requires estimation of                           



                          kidney damage for at                           



                          least three months as                           



                          defined by structural                           



                          or functional                           



                          abnormalities of the                           



                          kidney, manifested by                           



                          either:Pathological                           



                          abnormalities or                           



                          Markers of kidney                           



                          damage (including                           



                          abnormalities in the                           



                          composition of the                           



                          blood or urine or                           



                          abnormalities in                           



                          imaging tests).                           

 

             Lab Interpretation Abnormal                               



             (test code = 07950-0)                                        



[facility]





             Test Item    Value        Reference Range Interpretation Comments

 

             LIPASE (test code = 3283933308) 57 U/L       0-220                 

    

 

             Lab Interpretation (test code = Normal                             

    



             80336-2)                                            



[facility]





             Test Item    Value        Reference Range Interpretation Comments

 

             APPEARANCE (test code = Turbid       Clear        A            



             9603802400)                                         

 

             COLOR (test code = Yellow       Yellow                    



             5087369170)                                         

 

             PH (test code =              4.8-8.0                   



             5367978723)                                         

 

             SP GRAVITY (test code =              1.003-1.030               



             2686710735)                                         

 

             GLU U QUAL (test code = Normal       Normal                    



             3286483133)                                         

 

             BLOOD (test code = 1+           Negative     A            



             0799700196)                                         

 

             KETONES (test code = 20 mg/dL     Negative     A            



             6887085863)                                         

 

             PROTEIN (test code = 100 mg/dL    Negative     A            



             2887-8)                                             

 

             UROBILIN (test code = 2.0 mg/dL    Normal       A            



             0445711633)                                         

 

             BILIRUBIN (test code = Negative     Negative                  



             4806051155)                                         

 

             NITRITE (test code = Positive     Negative     A            



             7373860574)                                         

 

             LEUK FRANCE (test code = 250/uL       Negative     A            



             3744045261)                                         

 

             RBC/HPF (test code =              See_Comment  H             [Autom

ated message]



             8879989466)                                         The system [email or web address]



                                                                 generated this



                                                                 result transmit

hmua



                                                                 reference range

: 0 -



                                                                 3 HPF. The refe

rence



                                                                 range was not u

sed



                                                                 to interpret th

is



                                                                 result as



                                                                 normal/abnormal

.

 

             WBC/HPF (test code = >182         See_Comment  H             [Autom

ated message]



             7430146604)                                         The system [email or web address]



                                                                 generated this



                                                                 result transmit

huma



                                                                 reference range

: 0 -



                                                                 5 HPF. The refe

rence



                                                                 range was not u

sed



                                                                 to interpret th

is



                                                                 result as



                                                                 normal/abnormal

.

 

             BACTERIA (test code = Many         Negative     A            



             9291997394)                                         

 

             MUCOUS (test code = Moderate     Negative LPF A            



             7968509173)                                         

 

             WBC CLUMPS (test code =              See_Comment  H             [Au

tomated message]



             2486454813)                                         The system [email or web address]



                                                                 generated this



                                                                 result transmit

huma



                                                                 reference range

: <=1



                                                                 HPF. The refere

nce



                                                                 range was not u

sed



                                                                 to interpret th

is



                                                                 result as



                                                                 normal/abnormal

.

 

             Lab Interpretation (test Abnormal                               



             code = 94441-1)                                        



[facility] WITH DIFF2021-05-10 00:46:14





             Test Item    Value        Reference Range Interpretation Comments

 

             WBC (test code =              See_Comment                [Automated



             6690-2)                                             message] The sy

stem



                                                                 which generated



                                                                 this result



                                                                 transmitted



                                                                 reference range

:



                                                                 4.20 - 10.70



                                                                 10*3/?L. The



                                                                 reference range

 was



                                                                 not used to



                                                                 interpret this



                                                                 result as



                                                                 normal/abnormal

.

 

             RBC (test code =              See_Comment                [Automated



             789-8)                                              message] The sy

stem



                                                                 which generated



                                                                 this result



                                                                 transmitted



                                                                 reference range

:



                                                                 4.26 - 5.52



                                                                 10*6/?L. The



                                                                 reference range

 was



                                                                 not used to



                                                                 interpret this



                                                                 result as



                                                                 normal/abnormal

.

 

             HGB (test code = 15.9 g/dL    12.2-16.4                 



             718-7)                                              

 

             HCT (test code = 47.1 %       38.4-49.3                 



             4544-3)                                             

 

             MCV (test code = 86.6 fL      81.7-95.6                 



             787-2)                                              

 

             MCH (test code = 29.2 pg      26.1-32.7                 



             785-6)                                              

 

             MCHC (test code = 33.8 g/dL    31.2-35.0                 



             786-4)                                              

 

             RDW-SD (test code = 47.6 fL      38.5-51.6                 



             78832-9)                                            

 

             RDW-CV (test code = 15.2 %       12.1-15.4                 



             788-0)                                              

 

             PLT (test code =              See_Comment  H             [Automated



             777-3)                                              message] The sy

stem



                                                                 which generated



                                                                 this result



                                                                 transmitted



                                                                 reference range

:



                                                                 150 - 328 10*3/

?L.



                                                                 The reference r

zhen



                                                                 was not used to



                                                                 interpret this



                                                                 result as



                                                                 normal/abnormal

.

 

             MPV (test code = 9.4 fL       9.8-13.0     L            



             93895-5)                                            

 

             NRBC/100 WBC (test              See_Comment                [Automat

ed



             code = 8243912645)                                        message] 

The system



                                                                 which generated



                                                                 this result



                                                                 transmitted



                                                                 reference range

:



                                                                 0.0 - 10.0 /100



                                                                 WBCs. The refer

ence



                                                                 range was not u

sed



                                                                 to interpret th

is



                                                                 result as



                                                                 normal/abnormal

.

 

             NRBC x10^3 (test code <0.01        See_Comment                [Auto

mated



             = 0245087821)                                        message] The s

ystem



                                                                 which generated



                                                                 this result



                                                                 transmitted



                                                                 reference range

:



                                                                 10*3/?L. The



                                                                 reference range

 was



                                                                 not used to



                                                                 interpret this



                                                                 result as



                                                                 normal/abnormal

.

 

             GRAN MAT (NEUT) % 61.3 %                                 



             (test code = 770-8)                                        

 

             IMM GRAN % (test code 0.70 %                                 



             = 5322407908)                                        

 

             LYMPH % (test code = 26.4 %                                 



             736-9)                                              

 

             MONO % (test code = 9.9 %                                  



             5905-5)                                             

 

             EOS % (test code = 1.3 %                                  



             713-8)                                              

 

             BASO % (test code = 0.4 %                                  



             706-2)                                              

 

             GRAN MAT x10^3(ANC) 6.39 10*3/uL 1.99-6.95                 



             (test code =                                        



             7712430006)                                         

 

             IMM GRAN x10^3 (test 0.07 10*3/uL 0.00-0.06    H            



             code = 5357956763)                                        

 

             LYMPH x10^3 (test code 2.75 10*3/uL 1.09-3.23                 



             = 731-0)                                            

 

             MONO x10^3 (test code 1.03 10*3/uL 0.36-1.02    H            



             = 742-7)                                            

 

             EOS x10^3 (test code = 0.14 10*3/uL 0.06-0.53                 



             711-2)                                              

 

             BASO x10^3 (test code 0.04 10*3/uL 0.01-0.09                 



             = 704-7)                                            

 

             Lab Interpretation Abnormal                               



             (test code = 10958-2)                                        



[id number]





             Test Item    Value        Reference Range Interpretation Comments

 

             POC-GLUCOSE METER 140 mg/dL           H            : TESTED A

T [facility] [address]



             (BEAKER) (test code =                                        [facility],



             [facility])                                               [address]:



                                                                 /Techni

christina ID



                                                                 = [id number] for ANANT CATES



POCT-GLUCOSE [id number] 09:15:00





             Test Item    Value        Reference Range Interpretation Comments

 

             POC-GLUCOSE METER 142 mg/dL           H            : TESTED A

T [facility] [address]



             (BEAKER) (test code =                                        [facility],



             [facility])                                               [address]:



                                                                 /Techni

christina ID



                                                                 = [id number] for ANANT CATES



POCT-GLUCOSE METER2020-01-15 20:56:00





             Test Item    Value        Reference Range Interpretation Comments

 

             POC-GLUCOSE METER 134 mg/dL           H            : TESTED A

T [facility] [address]



             (BEAKER) (test code =                                        [facility],



             [facility])                                               [address]:



                                                                 /Techni

christina ID



                                                                 = [id number] for SHERRILL GUARDADO



POCT-GLUCOSE METER2020-01-15 16:46:00





             Test Item    Value        Reference Range Interpretation Comments

 

             POC-GLUCOSE METER 91 mg/dL                         : TESTED A

T [facility] [address]



             (BEAKER) (test code =                                        [facility],



             [facility])                                               [address]:



                                                                 /Techni

christina ID =



                                                                 [id number] for ANANT HAMILTON



POCT-GLUCOSE METER2020-01-15 12:19:00





             Test Item    Value        Reference Range Interpretation Comments

 

             POC-GLUCOSE METER 237 mg/dL           H            : TESTED A

T [facility] [address]



             (BEAKER) (test code =                                        [facility],



             [facility]8)                                               [address]:



                                                                 /Techni

christina ID



                                                                 = [id number] for ANANT CATES



MR, EXTREMITY, LOWER, WITHOUT CONTRAST, RIGHT2020-01-15 09:12:00FINAL REPORT 
PATIENT ID:   [id number] MRI of the right and left hindfoot without intravenous 
contrast History: Osteomyelitis, diabetic foot Comparison: Radiograph dated 
2020 Technique: Multiplanar multisequence MRI of the right and left 
hindfoot was performed without intravenous contrast using the hindfoot 
osteomyelitis protocol Findings: Right foot: Marked T1 and T2 hypointense soft 
tissue thickening is noted at the medial aspect of the right heel, likely to 
reflect scar tissue. There is also mild subcutaneous edema at the lateral aspect
of the mid foot and forefoot, incompletely imaged. No discrete drainable fluid 
collection is identified. There is no marrow signal abnormality to suggest 
osteomyelitis. There is a small nonspecific joint effusion at the ankle and 
subtalar joint. Scattered degenerative changes are noted. There is marked fatty 
atrophy of the distal leg and foot musculature. Severe flexor hallucis longus 
tendinopathy. No tenosynovitis. Left foot: There is a large area ofsoft tissue 
defect and granulation tissue at the posteromedial aspect of the heel, reaching 
the calcaneus, but there is no drainable fluid collection. Deformity is noted in
the hindfoot, and the forefoot appears adducted. No acute fracture. No marrow 
signal abnormality is identified to indicate acute osteomyelitis. There is no 
significant joint effusion. There is severe atrophy of the foot and distalleg 
musculature. No significant tenosynovitis identified. IMPRESSION: 
Granulation/scar tissue at themedial aspect of the heel bilaterally. No MR 
evidence of osteomyelitis. Severe muscle atrophy. Signed: Jorge Cornejo 
Verified Date/Time:  01/15/2020 09:12:01 Reading Location: [address] Ortho 
Consult Reading Room        Electronically signed by: JORGE CORNEJO M.D. on 
01/15/2020 09:12 AMMR, EXTREMITY, LOWER, WITHOUT CONTRAST, LEFT2020--15 
09:12:00FINAL REPORT PATIENT ID:   [id number] MRI of the right and left hindfoot 
without intravenous contrast History: Osteomyelitis, diabetic foot Comparison: 
Radiograph dated 2020 Technique: Multiplanar multisequence MRI of the
right and left hindfoot was performed without intravenous contrast using the 
hindfoot osteomyelitis protocol Findings: Right foot: Marked T1 and T2 
hypointense soft tissue thickening is noted at the medial aspect of the right 
heel, likely to reflect scar tissue. There is also mild subcutaneous edema at 
the lateral aspect of the mid foot and forefoot, incompletely imaged. No 
discrete drainable fluid collection is identified. There is no marrow signal 
abnormality to suggest osteomyelitis. There is a small nonspecific joint 
effusion at the ankle and subtalar joint. Scattered degenerative changes are 
noted. There is marked fatty atrophy of the distal leg and foot musculature. 
Severe flexor hallucis longus tendinopathy. No tenosynovitis. Left foot: There 
is a large area ofsoft tissue defect and granulation tissue at the posteromedial
aspect of the heel, reaching the calcaneus, but there is no drainable fluid 
collection. Deformity is noted in the hindfoot, and the forefoot appears 
adducted. No acute fracture. No marrow signal abnormality is identified to 
indicate acute osteomyelitis. There is no significant joint effusion. There is 
severe atrophy of the foot and distalleg musculature. No significant 
tenosynovitis identified. IMPRESSION: Granulation/scar tissue at themedial 
aspect of the heel bilaterally. No MR evidence of osteomyelitis. Severe muscle 
atrophy. Signed: Jorge Cornejoort Verified Date/Time:  01/15/2020 09:12:01 
Reading Location: [facility] Ortho Consult Reading Room        Electronically 
signed by: JORGE CORNEJO M.D. on 01/15/2020 09:12 AMPOCT-GLUCOSE METER2020-01-15 
08:47:00





             Test Item    Value        Reference Range Interpretation Comments

 

             POC-GLUCOSE METER 264 mg/dL           H            : TESTED A

T [facility] [address]



             (BEAKER) (test code =                                        [facility],



             1538)                                               [address]:



                                                                 /Techni

christina ID



                                                                 = [id number] for ANANT CATES



ISLET CELL AB SCR2020-01-15 07:43:00





             Test Item    Value        Reference Range Interpretation Comments

 

             ISLET CELL AB Refer to individual                           



             AUTOVERIFICATION (test Islet Cell Ab                           



             code = 2556) and/or Islet Cell                           



                          Ab Titer results.                           



POCT-GLUCOSE [id number] 21:27:00





             Test Item    Value        Reference Range Interpretation Comments

 

             POC-GLUCOSE METER 130 mg/dL           H            : TESTED A

T [facility] [address]



             (BEAKER) (test code =                                        [facility]

[facility],



             1538)                                               [address]:



                                                                 /Techni

christina ID



                                                                 = [id number] for KRANTHI PIERRE



BLOOD [id number] 13:00:00





             Test Item    Value        Reference Range Interpretation Comments

 

             CULTURE (BEAKER) (test No growth in 5 days                         

  



             code = 1095)                                        



BLOOD [id number] 13:00:00





             Test Item    Value        Reference Range Interpretation Comments

 

             CULTURE (BEAKER) (test No growth in 5 days                         

  



             code = 1095)                                        



POCT-GLUCOSE [id number] 12:57:00





             Test Item    Value        Reference Range Interpretation Comments

 

             POC-GLUCOSE METER 138 mg/dL           H            : TESTED A

T [facility] [address]



             ([facility]) (test code =                                        [facility],



             153)                                               [address]:



                                                                 /Techni

christina ID



                                                                 = [id number] for [place]

LLIS,



                                                                 BARBARA



POCT-GLUCOSE [id number] 08:43:00





             Test Item    Value        Reference Range Interpretation Comments

 

             POC-GLUCOSE METER 226 mg/dL           H            : TESTED A

T [facility] [address]



             ([facility]) (test code =                                        [facility],



             153)                                               [address]:



                                                                 /Techni

christina ID



                                                                 = [id number] for [place]

LLIS,



                                                                 BARBARA



POCT-GLUCOSE [id number] 21:11:00





             Test Item    Value        Reference Range Interpretation Comments

 

             POC-GLUCOSE METER 254 mg/dL           H            : Notified

 RN/MD:



             ([facility]) (test code =                                        TESTED

 AT [place] [id number]



             153)                                               [facility],



                                                                 [address]:



                                                                 /Techni

christina ID



                                                                 = [id number] for KRANTHI PIERRE



POCT-GLUCOSE [id number] 21:02:00





             Test Item    Value        Reference Range Interpretation Comments

 

             POC-GLUCOSE METER 177 mg/dL           H            : TESTED A

T [facility] [address]



             ([facility]) (test code =                                        [facility],



             153)                                               [address]:



                                                                 /Techni

christina ID



                                                                 = [id number] for [place]

LLIS,



                                                                 BARBARA



POCT-GLUCOSE [id number] 12:31:00





             Test Item    Value        Reference Range Interpretation Comments

 

             POC-GLUCOSE METER 215 mg/dL           H            : [facility]ED A

[facility]



             ([facility]) (test code =                                        [facility],



             153)                                               [address]:



                                                                 /Techni

christina ID



                                                                 = [id number] for [place]

LLIS,



                                                                 BARBARA



WOUND CULTURE + GRAM [id number] 10:10:00





             Test Item    Value        Reference    Interpretation Comments



                                       Range                     

 

             CULTURE ([facility]) PROTEUS MIRABILIS              A            1+ Pro

teus



             (test code = 1095)                                        mirabilis

 

             Amikacin (test code =                           S            



             1)                                                  

 

             Ampicillin +                           S            



             Sulbactam (test code                                        



             = 6)                                                

 

             Aztreonam (test code                           S            



             = 32)                                               

 

             Cefepime (test code =                           S            



             51)                                                 

 

             Cefoxitin (test code                           R            



             = 68)                                               

 

             Ceftazidime (test                           S            



             code = 27)                                          

 

             Ceftriaxone (test                           S            



             code = 52)                                          

 

             Ertapenem (test code                           S            



             = 38)                                               

 

             Gentamicin (test code                           S            



             = 18)                                               

 

             Levofloxacin (test                           R            



             code = 22)                                          

 

             Meropenem (test code                           S            



             = 34)                                               

 

             Piperacillin +                           S            



             Tazobactam (test code                                        



             = 29)                                               

 

             Tetracycline (test                           R            



             code = 2)                                           

 

             Tobramycin (test code                           S            



             = 25)                                               

 

             Trimethoprim +                           R            



             Sulfamethoxazole                                        



             (test code = 47)                                        

 

             CULTURE (BEAKER) METHICILLIN               A            1+ Methicil

bryn



             (test code = 1095) RESISTANT                              resistant



                          STAPHYLOCOCCUS                           Staphylococcu

s



                          AUREUS                                 aureus

 

             Clindamycin (test                           R            



             code = 10)                                          

 

             Erythromycin (test                           R            



             code = 4)                                           

 

             Linezolid (test code                           S            



             = 40)                                               

 

             Nitrofurantoin (test                           S            



             code = 23)                                          

 

             Oxacillin (test code                           R            



             = 14)                                               

 

             Rifampin (test code =                           S            



             43)                                                 

 

             Tetracycline (test                           S            



             code = 2)                                           

 

             Trimethoprim +                           S            



             Sulfamethoxazole                                        



             (test code = 47)                                        

 

             Vancomycin (test code                           S            



             = 13)                                               

 

             CULTURE (BEAKER) ESCHERICHIA COLI              A            <1+ Esc

herichia



             (test code = 1095)                                        coliESBL 

Positive

 

             Amikacin (test code =                           S            



             1)                                                  

 

             Ampicillin +                           R            



             Sulbactam (test code                                        



             = 6)                                                

 

             Aztreonam (test code                           R            



             = 32)                                               

 

             Cefepime (test code =                           R            



             51)                                                 

 

             Cefoxitin (test code                           R            



             = 68)                                               

 

             Ceftazidime (test                           R            



             code = 27)                                          

 

             Ceftriaxone (test                           R            



             code = 52)                                          

 

             Ertapenem (test code                           S            



             = 38)                                               

 

             Gentamicin (test code                           S            



             = 18)                                               

 

             Levofloxacin (test                           R            



             code = 22)                                          

 

             Meropenem (test code                           S            



             = 34)                                               

 

             Nitrofurantoin (test                           S            



             code = 23)                                          

 

             Piperacillin +                           R            



             Tazobactam (test code                                        



             = 29)                                               

 

             Tetracycline (test                           S            



             code = 2)                                           

 

             Tobramycin (test code                           S            



             = 25)                                               

 

             Trimethoprim +                           R            



             Sulfamethoxazole                                        



             (test code = 47)                                        

 

             CULTURE (BEAKER)                           A            Beta-hemoly

tic



             (test code = 1095)                                        streptoco

ccus



                                                                 group B, by



                                                                 serological



                                                                 grouping

 

             GRAM STAIN RESULT 3+ WBCs                                



             (BEAKER) (test code =                                        



             1123)                                               

 

             GRAM STAIN RESULT 1+ gram negative                           



             (BEAKER) (test code = rods                                   



             047720)                                             

 

             GRAM STAIN RESULT 2+ gram positive                           



             (BEAKER) (test code = rods                                   



             233130)                                             

 

             GRAM STAIN RESULT <1+ gram negative                           



             (BEAKER) (test code = coccobacilli                           



             468111)                                             

 

             GRAM STAIN RESULT 2+ gram positive                           



             (BEAKER) (test code = cocci in pairs                           



             902960)                                             



POCT-GLUCOSE [id number] 08:52:00





             Test Item    Value        Reference Range Interpretation Comments

 

             POC-GLUCOSE METER 209 mg/dL           H            : TESTED A

T [facility] [address]



             (BEAKER) (test code =                                        [facility],



             153)                                               [address]:



                                                                 /Techni

christina ID



                                                                 = [id number] for [place]

LLNIURKA,



                                                                 BARBARA



POCT-GLUCOSE [id number] 21:26:00





             Test Item    Value        Reference Range Interpretation Comments

 

             POC-GLUCOSE METER 255 mg/dL           H            : TESTED A

T [facility] [address]



             (BEAKER) (test code =                                        [facility],



             153)                                               [address]:



                                                                 /Techni

christina ID



                                                                 = [id number] for SHERRILL GUARDADO



POCT-GLUCOSE [id number] 17:34:00





             Test Item    Value        Reference Range Interpretation Comments

 

             POC-GLUCOSE METER 227 mg/dL           H            : TESTED A

T [facility] [address]



             (BEAKER) (test code =                                        [facility],



             153)                                               [address]:



                                                                 /Techni

christina ID



                                                                 = [id number] for WASSERMAN

NNY,



                                                                 NAKITA



POCT-GLUCOSE [id number] 11:28:00





             Test Item    Value        Reference Range Interpretation Comments

 

             POC-GLUCOSE METER 282 mg/dL           H            : TESTED A

T [facility] [address]



             (BEAKER) (test code =                                        [facility],



             153)                                               [address]:



                                                                 /Techni

christina ID



                                                                 = [id number] for WASSERMAN

NNY,



                                                                 NAKITA



POCT-GLUCOSE [id number] 08:19:00





             Test Item    Value        Reference Range Interpretation Comments

 

             POC-GLUCOSE METER 329 mg/dL           H            : TESTED A

T [facility] [address]



             (BEAKER) (test code =                                        [facility],



             153)                                               [address]:



                                                                 /Techni

christina ID



                                                                 = [id number] for ANANT CATES



POCT-GLUCOSE [id number] 21:32:00





             Test Item    Value        Reference Range Interpretation Comments

 

             POC-GLUCOSE METER 275 mg/dL           H            : TESTED A

T [facility] [address]



             (BEAKER) (test code =                                        [facility],



             1538)                                               [address]:



                                                                 /Techni

christina ID



                                                                 = [id number] for SHERRILL GUARDADO



POCT-GLUCOSE [id number] 17:47:00





             Test Item    Value        Reference Range Interpretation Comments

 

             POC-GLUCOSE METER 304 mg/dL           H            : TESTED A

T [facility] [address]



             (BEAKER) (test code =                                        [facility],



             1538)                                               [address]:



                                                                 /Techni

christina ID



                                                                 = [id number] for [place]

SAVAGE,



                                                                 LUIZA



URINE [id number] 15:21:00





             Test Item    Value        Reference Range Interpretation Comments

 

             CULTURE (BEAKER)                           A            >100,000 co

l/mL



             (test code = 1095)                                        Beta-hemo

lytic



                                                                 streptococcus g

roup



                                                                 B, by serologic

al



                                                                 grouping

 

             CULTURE (BEAKER) PROTEUS                   A            80-89,000 c

ol/mL



             (test code = 1095) MIRABILIS                              Proteus



                                                                 mirabilisESBL



                                                                 Positive

 

             Amikacin (test code =                           S            



             1)                                                  

 

             Ampicillin +                           R            



             Sulbactam (test code                                        



             = 6)                                                

 

             Aztreonam (test code                           R            



             = 32)                                               

 

             Cefepime (test code =                           R            



             51)                                                 

 

             Cefoxitin (test code                           R            



             = 68)                                               

 

             Ceftazidime (test                           R            



             code = 27)                                          

 

             Ceftriaxone (test                           R            



             code = 52)                                          

 

             Ertapenem (test code                           S            



             = 38)                                               

 

             Gentamicin (test code                           R            



             = 18)                                               

 

             Levofloxacin (test                           R            



             code = 22)                                          

 

             Meropenem (test code                           S            



             = 34)                                               

 

             Nitrofurantoin (test                           R            



             code = 23)                                          

 

             Tetracycline (test                           R            



             code = 2)                                           

 

             Tobramycin (test code                           R            



             = 25)                                               



POCT-GLUCOSE [id number] 12:16:00





             Test Item    Value        Reference Range Interpretation Comments

 

             POC-GLUCOSE METER 337 mg/dL           H            : TESTED A

T [facility] [address]



             (BEAKER) (test code =                                        [facility],



             1538)                                               [id number]:



                                                                 /Techni

christina ID



                                                                 = [id number] for HAWK WAN,



                                                                 LUIZA



BASIC METABOLIC [id number] 10:39:00





             Test Item    Value        Reference Range Interpretation Comments

 

             SODIUM (BEAKER) 134 meq/L    136-145      L            



             (test code = 381)                                        

 

             POTASSIUM (BEAKER) 4.6 meq/L    3.5-5.1                   



             (test code = 379)                                        

 

             CHLORIDE (BEAKER) 105 meq/L                        



             (test code = 382)                                        

 

             CO2 (BEAKER) (test 20 meq/L     22-29        L            



             code = 355)                                         

 

             BLOOD UREA NITROGEN 14 mg/dL     7-21                      



             (BEAKER) (test code                                        



             = 354)                                              

 

             CREATININE (BEAKER) 0.97 mg/dL   0.57-1.25                 



             (test code = 358)                                        

 

             GLUCOSE RANDOM 429 mg/dL           HH           



             (BEAKER) (test code                                        



             = 652)                                              

 

             CALCIUM (BEAKER) 8.9 mg/dL    8.4-10.2                  



             (test code = 697)                                        

 

             EGFR (BEAKER) (test 107 mL/min/1.73                           ESTIM

ATED GFR IS



             code = 1092) sq m                                   NOT AS ACCURATE

 AS



                                                                 CREATININE



                                                                 CLEARANCE IN



                                                                 PREDICTING



                                                                 GLOMERULAR



                                                                 FILTRATION RATE

.



                                                                 ESTIMATED GFR I

S



                                                                 NOT APPLICABLE 

FOR



                                                                 DIALYSIS PATIEN

TS.



 ID - MAGAN FPOCT-GLUCOSE [id number] 08:29:00





             Test Item    Value        Reference Range Interpretation Comments

 

             POC-GLUCOSE METER 395 mg/dL           H            : TESTED A

T [facility] [address]



             (BEAKER) (test code =                                        [facility],



             1538)                                               [id number]:



                                                                 /Techni

christina ID



                                                                 = [id number] for LUIZA FIGUEROA



CBC W/PLT COUNT &amp; AUTO [id number] 06:40:00





             Test Item    Value        Reference Range Interpretation Comments

 

             WHITE BLOOD CELL COUNT (BEAKER) 7.2 K/ L     3.5-10.5              

    



             (test code = 775)                                        

 

             RED BLOOD CELL COUNT (BEAKER) 5.48 M/ L    4.63-6.08               

  



             (test code = 761)                                        

 

             HEMOGLOBIN (BEAKER) (test code = 15.1 GM/DL   13.7-17.5            

     



             410)                                                

 

             HEMATOCRIT (BEAKER) (test code = 46.4 %       40.1-51.0            

     



             411)                                                

 

             MEAN CORPUSCULAR VOLUME (BEAKER) 84.7 fL      79.0-92.2            

     



             (test code = 753)                                        

 

             MEAN CORPUSCULAR HEMOGLOBIN 27.6 pg      25.7-32.2                 



             (BEAKER) (test code = 751)                                        

 

             MEAN CORPUSCULAR HEMOGLOBIN CONC 32.5 GM/DL   32.3-36.5            

     



             (BEAKER) (test code = 752)                                        

 

             RED CELL DISTRIBUTION WIDTH 15.5 %       11.6-14.4    H            



             (BEAKER) (test code = 412)                                        

 

             PLATELET COUNT (BEAKER) (test 315 K/CU MM  150-450                 

  



             code = 756)                                         

 

             MEAN PLATELET VOLUME (BEAKER) 10.5 fL      9.4-12.4                

  



             (test code = 754)                                        

 

             NUCLEATED RED BLOOD CELLS 0 /100 WBC   0-0                       



             (BEAKER) (test code = 413)                                        

 

             NEUTROPHILS RELATIVE PERCENT 62 %                                  

 



             (BEAKER) (test code = 429)                                        

 

             LYMPHOCYTES RELATIVE PERCENT 26 %                                  

 



             (BEAKER) (test code = 430)                                        

 

             MONOCYTES RELATIVE PERCENT 9 %                                    



             (BEAKER) (test code = 431)                                        

 

             EOSINOPHILS RELATIVE PERCENT 2 %                                   

 



             (BEAKER) (test code = 432)                                        

 

             BASOPHILS RELATIVE PERCENT 0 %                                    



             (BEAKER) (test code = 437)                                        

 

             NEUTROPHILS ABSOLUTE COUNT 4.48 K/ L    1.78-5.38                 



             (BEAKER) (test code = 670)                                        

 

             LYMPHOCYTES ABSOLUTE COUNT 1.87 K/ L    1.32-3.57                 



             (BEAKER) (test code = 414)                                        

 

             MONOCYTES ABSOLUTE COUNT (BEAKER) 0.62 K/ L    0.30-0.82           

      



             (test code = 415)                                        

 

             EOSINOPHILS ABSOLUTE COUNT 0.17 K/ L    0.04-0.54                 



             (BEAKER) (test code = 416)                                        

 

             BASOPHILS ABSOLUTE COUNT (BEAKER) 0.03 K/ L    0.01-0.08           

      



             (test code = 417)                                        

 

             IMMATURE GRANULOCYTES-RELATIVE 1 %          0-1                    

   



             PERCENT (BEAKER) (test code =                                      

  



             2801)                                               



POCT-GLUCOSE METER2020-01-10 19:55:00





             Test Item    Value        Reference Range Interpretation Comments

 

             POC-GLUCOSE METER 369 mg/dL           H            : TESTED A

T [facility] [address]



             (BEAKER) (test code =                                        [facility],



             153)                                               [id number]:



                                                                 /Techni

christnia ID



                                                                 = [id number] for SHERRILL GUARDADO



POCT-GLUCOSE METER2020-01-10 16:45:00





             Test Item    Value        Reference Range Interpretation Comments

 

             POC-GLUCOSE METER 272 mg/dL           H            : TESTED A

T [facility] [address]



             (BEAKER) (test code =                                        [facility],



             153)                                               [address]:



                                                                 /Techni

christina ID



                                                                 = [id number] for SARAH VIDES



POCT-GLUCOSE METER2020-01-10 11:40:00





             Test Item    Value        Reference Range Interpretation Comments

 

             POC-GLUCOSE METER 277 mg/dL           H            : TESTED A

T [facility] [address]



             (BEAKER) (test code =                                        [place] [facility],



             1538)                                               [id number]:



                                                                 /Techni

christina ID



                                                                 = [id number] for SARAH VIDES



BASIC METABOLIC PANEL2020-01-10 10:22:00





             Test Item    Value        Reference Range Interpretation Comments

 

             SODIUM (BEAKER) 132 meq/L    136-145      L            



             (test code = 381)                                        

 

             POTASSIUM (BEAKER) 4.4 meq/L    3.5-5.1                   



             (test code = 379)                                        

 

             CHLORIDE (BEAKER) 103 meq/L                        



             (test code = 382)                                        

 

             CO2 (BEAKER) (test 21 meq/L     22-29        L            



             code = 355)                                         

 

             BLOOD UREA NITROGEN 14 mg/dL     7-21                      



             (BEAKER) (test code                                        



             = 354)                                              

 

             CREATININE (BEAKER) 0.89 mg/dL   0.57-1.25                 



             (test code = 358)                                        

 

             GLUCOSE RANDOM 428 mg/dL           HH           



             (BEAKER) (test code                                        



             = 652)                                              

 

             CALCIUM (BEAKER) 9.2 mg/dL    8.4-10.2                  



             (test code = 697)                                        

 

             EGFR (BEAKER) (test 119 mL/min/1.73                           ESTIM

ATED GFR IS



             code = 1092) sq m                                   NOT AS ACCURATE

 AS



                                                                 CREATININE



                                                                 CLEARANCE IN



                                                                 PREDICTING



                                                                 GLOMERULAR



                                                                 FILTRATION RATE

.



                                                                 ESTIMATED GFR I

S



                                                                 NOT APPLICABLE 

FOR



                                                                 DIALYSIS PATIEN

TS.



 ID - NTPLIPID PANEL2020-01-10 10:17:00





             Test Item    Value        Reference Range Interpretation Comments

 

             TRIGLYCERIDES (BEAKER) (test code = 692 mg/dL                      

        



             540)                                                

 

             CHOLESTEROL (BEAKER) (test code = 196 mg/dL                        

      



             631)                                                

 

             HDL CHOLESTEROL (BEAKER) (test code 24 mg/dL                       

        



             = 976)                                              



Calculated LDL not valid if triglyceride &gt;400 mg/dLTriglyceride Reference 
Range:   Low Risk  &lt;150   Borderline    150-199   High Risk     200-499   
Very High Risk  &gt;=500Cholesterol Reference Range:   Low Risk         &lt;200 
 Borderline    200-239    High Risk        &gt;240HDL Cholesterol Reference 
Range:   Low Risk         &gt;=60   High Risk         &lt;40LDL Cholesterol 
ReferenceRange:   Optimal          &lt;100   Near Optimal  100-129   Borderline 
  130-159   High          160-189   Very High       &gt;=190    ID - NTP
POCT-GLUCOSE METER2020-01-10 08:28:00





             Test Item    Value        Reference Range Interpretation Comments

 

             POC-GLUCOSE METER 388 mg/dL           H            : TESTED A

T [facility] [address]



             (BEAKER) (test code =                                        [place],



             1538)                                               [id number]:



                                                                 /Techni

christina ID



                                                                 = [id number] for ANANT CATES



HEMOGLOBIN A1C2020-01-10 07:54:00





             Test Item    Value        Reference Range Interpretation Comments

 

             HEMOGLOBIN A1C (BEAKER) (test code = 10.4 %       4.3-6.1      H   

         



             368)                                                



CBC W/PLT COUNT &amp; AUTO DIFFERENTIAL2020-01-10 05:56:00





             Test Item    Value        Reference Range Interpretation Comments

 

             WHITE BLOOD CELL COUNT (BEAKER) 6.5 K/ L     3.5-10.5              

    



             (test code = 775)                                        

 

             RED BLOOD CELL COUNT (BEAKER) 5.21 M/ L    4.63-6.08               

  



             (test code = 761)                                        

 

             HEMOGLOBIN (BEAKER) (test code = 14.6 GM/DL   13.7-17.5            

     



             410)                                                

 

             HEMATOCRIT (BEAKER) (test code = 44.3 %       40.1-51.0            

     



             411)                                                

 

             MEAN CORPUSCULAR VOLUME (BEAKER) 85.0 fL      79.0-92.2            

     



             (test code = 753)                                        

 

             MEAN CORPUSCULAR HEMOGLOBIN 28.0 pg      25.7-32.2                 



             (BEAKER) (test code = 751)                                        

 

             MEAN CORPUSCULAR HEMOGLOBIN CONC 33.0 GM/DL   32.3-36.5            

     



             (BEAKER) (test code = 752)                                        

 

             RED CELL DISTRIBUTION WIDTH 15.6 %       11.6-14.4    H            



             (BEAKER) (test code = 412)                                        

 

             PLATELET COUNT (BEAKER) (test 294 K/CU MM  150-450                 

  



             code = 756)                                         

 

             MEAN PLATELET VOLUME (BEAKER) 11.2 fL      9.4-12.4                

  



             (test code = 754)                                        

 

             NUCLEATED RED BLOOD CELLS 0 /100 WBC   0-0                       



             (BEAKER) (test code = 413)                                        

 

             NEUTROPHILS RELATIVE PERCENT 56 %                                  

 



             (BEAKER) (test code = 429)                                        

 

             LYMPHOCYTES RELATIVE PERCENT 31 %                                  

 



             (BEAKER) (test code = 430)                                        

 

             MONOCYTES RELATIVE PERCENT 10 %                                   



             (BEAKER) (test code = 431)                                        

 

             EOSINOPHILS RELATIVE PERCENT 2 %                                   

 



             (BEAKER) (test code = 432)                                        

 

             BASOPHILS RELATIVE PERCENT 1 %                                    



             (BEAKER) (test code = 437)                                        

 

             NEUTROPHILS ABSOLUTE COUNT 3.66 K/ L    1.78-5.38                 



             (BEAKER) (test code = 670)                                        

 

             LYMPHOCYTES ABSOLUTE COUNT 2.03 K/ L    1.32-3.57                 



             ([facility]) (test code = 414)                                        

 

             MONOCYTES ABSOLUTE COUNT (BEAKER) 0.63 K/ L    0.30-0.82           

      



             (test code = 415)                                        

 

             EOSINOPHILS ABSOLUTE COUNT 0.15 K/ L    0.04-0.54                 



             (AKER) (test code = 416)                                        

 

             BASOPHILS ABSOLUTE COUNT (AKER) 0.03 K/ L    0.01-0.08           

      



             (test code = 417)                                        

 

             IMMATURE GRANULOCYTES-RELATIVE 1 %          0-1                    

   



             PERCENT ([facility]) (test code =                                      

  



             2801)                                               



POCT-GLUCOSE [id number] 23:47:00





             Test Item    Value        Reference Range Interpretation Comments

 

             POC-GLUCOSE METER 359 mg/dL           H            : Notified

 RN/MD:



             ([facility]) (test code =                                        TESTED

 AT [place] [address]



             )                                               [facility],



                                                                 [id number]:



                                                                 /Techni

christina ID



                                                                 = [id number] for



                                                                 LATHBRIDGE, ALESSIA

ICE



POCT-GLUCOSE [id number] 21:13:00





             Test Item    Value        Reference Range Interpretation Comments

 

             POC-GLUCOSE METER 315 mg/dL           H            : TESTED A

[facility]



             ([facility]) (test code =                                        [facility],



             153)                                               [address]:



                                                                 /Techni

christina ID



                                                                 = [id number] for Sm

ith,



                                                                 Nicki



POCT-GLUCOSE [id number] 21:13:00





             Test Item    Value        Reference Range Interpretation Comments

 

             POC-GLUCOSE METER 331 mg/dL           H            : TESTED A

[facility]



             ([facility]) (test code =                                        [facility],



             153)                                               [address]:



                                                                 /Techni

christina ID



                                                                 = [id number] for RO

DGERS,



                                                                 JAMECA



POCT-GLUCOSE [id number] 10:30:00





             Test Item    Value        Reference Range Interpretation Comments

 

             POC-GLUCOSE METER 341 mg/dL           H            : TESTED A

[facility]



             ([facility]) (test code =                                        [facility],



             153)                                               [address]:



                                                                 /Techni

christina ID



                                                                 = [id number] for Sm

ith,



                                                                 Nicki



CBC W/PLT COUNT &amp; AUTO [id number] 08:48:00





             Test Item    Value        Reference Range Interpretation Comments

 

             WHITE BLOOD CELL COUNT (BEAKER) 6.7 K/ L     3.5-10.5              

    



             (test code = 775)                                        

 

             RED BLOOD CELL COUNT (BEAKER) 5.28 M/ L    4.63-6.08               

  



             (test code = 761)                                        

 

             HEMOGLOBIN (BEAKER) (test code = 14.6 GM/DL   13.7-17.5            

     



             410)                                                

 

             HEMATOCRIT (BEAKER) (test code = 44.9 %       40.1-51.0            

     



             411)                                                

 

             MEAN CORPUSCULAR VOLUME (BEAKER) 85.0 fL      79.0-92.2            

     



             (test code = 753)                                        

 

             MEAN CORPUSCULAR HEMOGLOBIN 27.7 pg      25.7-32.2                 



             (BEAKER) (test code = 751)                                        

 

             MEAN CORPUSCULAR HEMOGLOBIN CONC 32.5 GM/DL   32.3-36.5            

     



             (BEAKER) (test code = 752)                                        

 

             RED CELL DISTRIBUTION WIDTH 15.3 %       11.6-14.4    H            



             (BEAKER) (test code = 412)                                        

 

             PLATELET COUNT (BEAKER) (test 300 K/CU MM  150-450                 

  



             code = 756)                                         

 

             MEAN PLATELET VOLUME (BEAKER) 10.4 fL      9.4-12.4                

  



             (test code = 754)                                        

 

             NUCLEATED RED BLOOD CELLS 0 /100 WBC   0-0                       



             (BEAKER) (test code = 413)                                        



(MANUAL DIFFERENTIAL)2020 08:48:00





             Test Item    Value        Reference Range Interpretation Comments

 

             NEUTROPHILS - REL (DIFF) (BEAKER) 69 %                             

      



             (test code = 1359)                                        

 

             LYMPHOCYTES - REL (DIFF) (BEAKER) 25 %                             

      



             (test code = 1360)                                        

 

             MONOCYTES - REL (DIFF) (BEAKER) 3 %                                

    



             (test code = 1361)                                        

 

             EOSINOPHILS - REL (DIFF) (BEAKER) 3 %                              

      



             (test code = 1362)                                        

 

             BASOPHILS - REL (DIFF) (BEAKER) 0 %                                

    



             (test code = 1363)                                        

 

             NEUTROPHILS - ABS (DIFF) (BEAKER) 4.62 K/ L    1.80-8.00           

      



             (test code = 1365)                                        

 

             LYMPHOCYTES - ABS (DIFF) (BEAKER) 1.68 K/ L    1.48-4.50           

      



             (test code = 1366)                                        

 

             MONOCYTES - ABS (DIFF) (BEAKER) 0.20 K/ L    0.00-1.30             

    



             (test code = 1367)                                        

 

             EOSINOPHILS - ABS (DIFF) (BEAKER) 0.20 K/ L    0.00-0.50           

      



             (test code = 1368)                                        

 

             BASOPHILS - ABS (DIFF) (BEAKER) 0.00 K/ L    0.00-0.20             

    



             (test code = 1369)                                        

 

             TOTAL COUNTED (BEAKER) (test code = 100                            

        



             1351)                                               

 

             PLT MORPHOLOGY (BEAKER) (test code Normal                          

       



             = 486)                                              

 

             RBC MORPHOLOGY (BEAKER) (test code Normal                          

       



             = 762)                                              

 

             SMUDGE CELLS (BEAKER) (test code = Present                         

       



             1371)                                               



HEMOGLOBIN [id number] 06:39:00





             Test Item    Value        Reference Range Interpretation Comments

 

             HEMOGLOBIN A1C (BEAKER) (test code = 10.5 %       4.3-6.1      H   

         



             368)                                                



LIPID [id number] 04:57:00





             Test Item    Value        Reference Range Interpretation Comments

 

             TRIGLYCERIDES (BEAKER) 1251 mg/dL                             Speci

men moderately



             (test code = 540)                                        hemolyzed

 

             CHOLESTEROL (BEAKER) 215 mg/dL                              Specime

n moderately



             (test code = 631)                                        hemolyzed

 

             HDL CHOLESTEROL 22 mg/dL                               



             (BEAKER) (test code =                                        



             976)                                                



Calculated LDL not valid if triglyceride &gt;400 mg/dLTriglyceride Reference 
Range:   Low Risk  &lt;150   Borderline    150-199   High Risk     200-499   
Very High Risk  &gt;=500Cholesterol Reference Range:   Low Risk         &lt;200 
 Borderline    200-239    High Risk        &gt;240HDL Cholesterol Reference 
Range:   Low Risk         &gt;=60   High Risk         &lt;40LDL Cholesterol 
ReferenceRange:   Optimal          &lt;100   Near Optimal  100-129   Borderline 
  130-159   High          160-189   Very High       &gt;=190   Specimen 
moderately lipemicBASIC METABOLIC [id number] 04:56:00





             Test Item    Value        Reference Range Interpretation Comments

 

             SODIUM (BEAKER) 137 meq/L    136-145                   



             (test code = 381)                                        

 

             POTASSIUM (BEAKER) 4.6 meq/L    3.5-5.1                   Specimen 

moderately



             (test code = 379)                                        hemolyzed

 

             CHLORIDE (BEAKER) 106 meq/L                        



             (test code = 382)                                        

 

             CO2 (BEAKER) (test 20 meq/L     22-29        L            



             code = 355)                                         

 

             BLOOD UREA NITROGEN 12 mg/dL     7-21                      



             (BEAKER) (test code                                        



             = 354)                                              

 

             CREATININE (BEAKER) 0.95 mg/dL   0.57-1.25                 Specimen

 moderately



             (test code = 358)                                        hemolyzed

 

             GLUCOSE RANDOM 398 mg/dL           H            



             (BEAKER) (test code                                        



             = 652)                                              

 

             CALCIUM (BEAKER) 8.8 mg/dL    8.4-10.2                  



             (test code = 697)                                        

 

             EGFR (BEAKER) (test 110 mL/min/1.73                           ESTIM

ATED GFR IS



             code = 1092) sq m                                   NOT AS ACCURATE

 AS



                                                                 CREATININE



                                                                 CLEARANCE IN



                                                                 PREDICTING



                                                                 GLOMERULAR



                                                                 FILTRATION RATE

.



                                                                 ESTIMATED GFR I

S



                                                                 NOT APPLICABLE 

FOR



                                                                 DIALYSIS PATIEN

TS.



POCT-GLUCOSE [id number] 21:53:00





             Test Item    Value        Reference Range Interpretation Comments

 

             POC-GLUCOSE METER > mg/dL             HH           : Notified

 RN/MD: TESTED



             (KUN) (test code =                                        AT [facility] [address] [place] [address]



             7498)                                               [facility], [address]

30:



                                                                 /Techni

christina ID =



                                                                 [id number] for ZECHARIAH TRACY



U/S, ABDOMINAL, [id number] 19:54:00Reason for exam:-&gt;Evaluation of  
shunt and for possible cyst or pseudocyst Should this be performed at the 
bedside?-&gt;YesFINAL REPORT PATIENT ID:   [id number] Limited abdominal 
ultrasound. CLINICAL HISTORY: Evaluation of VPshunt for possible cyst or 
pseudocyst. COMPARISON STUDY: None available. FINDINGS: Sonographic assessment 
of the abdomen was performed assessing for fluid in the region of the patient's 
shunts. No fluid collections are seen. However, the study is limited by the 
patient's body habitus. CT scan would bemore sensitive. Signed: Madison Hendrickson 
MDReport Verified Date/Time:  2020 19:54:10 Reading Location: [address]
Consult Reading Room      Electronically signed by: MADISON HENDRICKSON M.D. on 
2020 07:54 PMPOCT-GLUCOSE [id number] 19:34:00





             Test Item    Value        Reference Range Interpretation Comments

 

             POC-GLUCOSE METER 474 mg/dL           HH           : Notified

 RN/MD:



             (KUN) (test code =                                        TESTED

 AT [place] [id number])                                               [facility],



                                                                 [id number]:



                                                                 /Techni

christina ID



                                                                 = [id number] for LONA URIOSTEGUI



URINALYSIS W/ REFLEX URINE [id number] 17:48:00





             Test Item    Value        Reference Range Interpretation Comments

 

             COLOR (KUN) (test code = 470) Yellow                            

     

 

             CLARITY (BEAKER) (test code = Hazy                                 

  



             469)                                                

 

             SPECIFIC GRAVITY UA (BEAKER) 1.020        1.001-1.035              

 



             (test code = 468)                                        

 

             PH UA (BEAKER) (test code = 467) 6.0          5.0-8.0              

     

 

             PROTEIN UA (BEAKER) (test code = 20 mg/dL     Negative     A       

     



             464)                                                

 

             GLUCOSE UA (BEAKER) (test code = >1000 mg/dL  Negative     A       

     



             365)                                                

 

             KETONES UA (BEAKER) (test code = 40 mg/dL     Negative     A       

     



             371)                                                

 

             BILIRUBIN UA (BEAKER) (test code Negative     Negative             

     



             = 462)                                              

 

             BLOOD UA (BEAKER) (test code = Moderate     Negative     A         

   



             461)                                                

 

             NITRITE UA (BEAKER) (test code = Positive     Negative     A       

     



             465)                                                

 

             LEUKOCYTE ESTERASE UA (BEAKER) Large        Negative     A         

   



             (test code = 466)                                        

 

             UROBILINOGEN UA (BEAKER) (test 0.2 mg/dL    0.2-1.0                

   



             code = 463)                                         

 

             RBC UA (BEAKER) (test code = 519) 0 /HPF                           

      

 

             WBC UA (BEAKER) (test code = 520) 267 /HPF                         

      

 

             BACTERIA (BEAKER) (test code = Moderate                            

   



             517)                                                

 

             SOURCE(BEAKER) (test code = 1879)                                  

      



CBC W/PLT COUNT &amp; AUTO [id number] 17:38:00





             Test Item    Value        Reference Range Interpretation Comments

 

             WHITE BLOOD CELL COUNT (BEAKER) 7.8 K/ L     3.5-10.5              

    



             (test code = 775)                                        

 

             RED BLOOD CELL COUNT (BEAKER) 5.12 M/ L    4.63-6.08               

  



             (test code = 761)                                        

 

             HEMOGLOBIN (BEAKER) (test code = 14.7 GM/DL   13.7-17.5            

     



             410)                                                

 

             HEMATOCRIT (BEAKER) (test code = 43.3 %       40.1-51.0            

     



             411)                                                

 

             MEAN CORPUSCULAR VOLUME (BEAKER) 84.6 fL      79.0-92.2            

     



             (test code = 753)                                        

 

             MEAN CORPUSCULAR HEMOGLOBIN 28.7 pg      25.7-32.2                 



             (BEAKER) (test code = 751)                                        

 

             MEAN CORPUSCULAR HEMOGLOBIN CONC 33.9 GM/DL   32.3-36.5            

     



             (BEAKER) (test code = 752)                                        

 

             RED CELL DISTRIBUTION WIDTH 15.3 %       11.6-14.4    H            



             (BEAKER) (test code = 412)                                        

 

             PLATELET COUNT (BEAKER) (test 344 K/CU MM  150-450                 

  



             code = 756)                                         

 

             MEAN PLATELET VOLUME (BEAKER) 11.0 fL      9.4-12.4                

  



             (test code = 754)                                        

 

             NUCLEATED RED BLOOD CELLS 0 /100 WBC   0-0                       



             (BEAKER) (test code = 413)                                        



(CELLAVISION MANUAL DIFF)2020 17:38:00





             Test Item    Value        Reference Range Interpretation Comments

 

             NEUTROPHILS - REL 63 %                                   



             (CELLAVISION)(BEAKER) (test code                                   

     



             = 2816)                                             

 

             LYMPHOCYTES - REL 23 %                                   



             (CELLAVISION)(BEAKER) (test code                                   

     



             = 2817)                                             

 

             MONOCYTES - REL 6 %                                    



             (CELLAVISION)(BEAKER) (test code                                   

     



             = 2818)                                             

 

             EOSINOPHILS - REL 5 %                                    



             (CELLAVISION)(BEAKER) (test code                                   

     



             = 2819)                                             

 

             BASOPHILS - REL 1 %                                    



             (CELLAVISION)(BEAKER) (test code                                   

     



             = 2820)                                             

 

             BANDS - REL (CELLAVISION)(BEAKER) 2 %          0-10                

      



             (test code = 2826)                                        

 

             NEUTROPHILS - ABS 4.91 K/ul    1.78-5.38                 



             (CELLAVISION)(BEAKER) (test code                                   

     



             = 2830)                                             

 

             LYMPHOCYTES - ABS 1.79 K/ul    1.32-3.57                 



             (CELLAVISION)(BEAKER) (test code                                   

     



             = 2831)                                             

 

             MONOCYTES - ABS 0.47 K/uL    0.30-0.82                 



             (CELLAVISION)(BEAKER) (test code                                   

     



             = 2832)                                             

 

             EOSINOPHILS - ABS 0.39 K/uL    0.04-0.54                 



             (CELLAVISION)(BEAKER) (test code                                   

     



             = 2834)                                             

 

             BASOPHILS - ABS 0.08 K/uL    0.01-0.08                 



             (CELLAVISION)(BEAKER) (test code                                   

     



             = 2835)                                             

 

             BANDS - ABS (CELLAVISION)(BEAKER) 0.16 K/uL    0.00-0.80           

      



             (test code = 2840)                                        

 

             TOTAL COUNTED (BEAKER) (test code 100                              

      



             = 1351)                                             

 

             SMUDGE CELLS (BEAKER) (test code Present                           

     



             = 1371)                                             

 

             GIANT PLATELETS (BEAKER) (test Present                             

   



             code = 313)                                         

 

             ANISOCYTOSIS (BEAKER) (test code 1+ few                            

     



             = 961)                                              

 

             POIKILOCYTES (BEAKER) (test code 2+ moderate                       

     



             = 966)                                              

 

             SPHEROCYTES (BEAKER) (test code = 1+ few                           

      



             768)                                                

 

             OVALOCYTES (BEAKER) (test code = 1+ few                            

     



             477)                                                

 

             TEAR DROP CELLS (BEAKER) (test 1+ few                              

   



             code = 481)                                         

 

             ARTIFACT (CELLAVISION)(BEAKER) Present                             

   



             (test code = 3432)                                        

 

             PLATELET CONCENTRATION Adequate                               



             (CELLAVISION)(BEAKER) (test code                                   

     



             = 3438)                                             



Received comment: User comments: Slide comments:POCT-GLUCOSE [id number] 
16:27:00





             Test Item    Value        Reference Range Interpretation Comments

 

             POC-GLUCOSE METER 424 mg/dL           HH           : Notified

 RN/MD:



             (BEAKER) (test code =                                        TESTED

 AT [facility] [address] [address])                                               [facility],



                                                                 [address]:



                                                                 /Techni

christina ID



                                                                 = [id number] for AK

INSONU,



                                                                 LONA



CT, BRAIN, WITHOUT [id number] 15:31:00FINAL REPORT PATIENT ID:   
[id number] CT, BRAIN, WITHOUT CONTRAST CLINICAL INDICATION:  Hydrocephalus C
OMPARISON: None TECHNIQUE:  Noncontrast axial CT imaging of the brain and skull.
 DOSE REDUCTION: Dose modulation, iterative reconstruction, and/or weight-based 
adjustment of the mA/kV was utilized to reduce the radiation dose to as low as 
reasonably achievable. FINDINGS:A total of two ventricular drainage catheters 
are present. A right transverse temporal catheter terminates across the midline 
just above the level of the foramen of Monro. A right parietal approach catheter
terminates in the pineal region. Supratentorial ventricular configuration is 
slitlike. There is no herniation. The basilar cisterns are preserved. There is 
no identifiable recent infarct. 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 Calvarial configuration escape of 
cephalic. Thereis no acute osseous abnormality.  IMPRESSION: Chronic, likely 
congenital parenchymal changes withoutrecent infarct or hemorrhage. A total of 
two ventricular drainage catheters are present. Supratentorial ventricular 
configuration is slitlike and may represent over shunting. Correlation 
recommended. Signed: JR Rae Robert MDReport Verified Date/Time:  
2020 15:31:08 Reading Location: [facility] Neuro Reading Room    
Electronically signed by: ALONDRA RAE on 2020 03:31 PMRAD, 
SHUNT [id number] 15:13:00Reason for exam:-&gt;concern for VPS malfunction
FINAL REPORT PATIENT ID:   [id number] RAD, SHUNT SERIES INDICATION: concern for 
VPS malfunction COMPARISON: None TECHNIQUE: AP and lateral radiographs of the 
skull, abdomen and chest were acquired to evaluate shunt catheter FINDINGS:An 
abandoned shunt catheter is present within the right neck. Medial tothis a 
second shunt catheter is present which descends along the left chest wall, 
terminating withinthe mid pelvis. Radiopaque portions of the catheter appear 
contiguous. Signed: Lisa Tan MDReport Verified Date/Time:  2020 
15:13:54 Reading Location: [facility] Radiology Reading Room  Electronically 
signed by: LISA TAN MD on 2020 03:13 PMPOCT-GLUCOSE METER
2020 11:38:00





             Test Item    Value        Reference Range Interpretation Comments

 

             POC-GLUCOSE METER 394 mg/dL           H            : TESTED A

T [facility] [address]



             [facility] (test code =                                        MILY NELSON [facility],



             1538)                                               [id number]:



                                                                 /Techni

christina ID



                                                                 = [id number] for LONA URIOSTEGUI



HEMOGLOBIN [id number] 09:15:00





             Test Item    Value        Reference Range Interpretation Comments

 

             HEMOGLOBIN A1C (BEAKER) (test code = 10.4 %       4.3-6.1      H   

         



             368)                                                



TSH/FREE T4 IF [id number] 07:54:00





             Test Item    Value        Reference Range Interpretation Comments

 

             THYROID STIMULATING HORMONE 1.40 uIU/mL  0.35-4.94                 



             (BEAKER) (test code = 772)                                        



POCT-GLUCOSE [id number] 07:48:00





             Test Item    Value        Reference Range Interpretation Comments

 

             POC-GLUCOSE METER > mg/dL             HH           : Notified

 RN/MD: TESTED



             (BEAKER) (test code =                                        AT [facility] [address] [facility]



             1538)                                               [facility], [address]

30:



                                                                 /Techni

christina ID =



                                                                 [id number] for LONA GOLDSMITH



BASIC METABOLIC [id number] 07:44:00





             Test Item    Value        Reference Range Interpretation Comments

 

             SODIUM (BEAKER) 134 meq/L    136-145      L            



             (test code = 381)                                        

 

             POTASSIUM (BEAKER) 4.4 meq/L    3.5-5.1                   



             (test code = 379)                                        

 

             CHLORIDE (BEAKER) 103 meq/L                        



             (test code = 382)                                        

 

             CO2 (BEAKER) (test 20 meq/L     22-29        L            



             code = 355)                                         

 

             BLOOD UREA NITROGEN 17 mg/dL     7-21                      



             (BEAKER) (test code                                        



             = 354)                                              

 

             CREATININE (BEAKER) 1.09 mg/dL   0.57-1.25                 



             (test code = 358)                                        

 

             GLUCOSE RANDOM 464 mg/dL           HH           



             (BEAKER) (test code                                        



             = 652)                                              

 

             CALCIUM (BEAKER) 8.6 mg/dL    8.4-10.2                  



             (test code = 697)                                        

 

             EGFR (BEAKER) (test 94 mL/min/1.73                           ESTIMA

HUMA GFR IS



             code = 1092) sq m                                   NOT AS ACCURATE

 AS



                                                                 CREATININE



                                                                 CLEARANCE IN



                                                                 PREDICTING



                                                                 GLOMERULAR



                                                                 FILTRATION RATE

.



                                                                 ESTIMATED GFR I

S



                                                                 NOT APPLICABLE 

FOR



                                                                 DIALYSIS PATIEN

TS.



LIPID [id number] 07:34:00





             Test Item    Value        Reference Range Interpretation Comments

 

             TRIGLYCERIDES (BEAKER) (test code = 750 mg/dL                      

        



             540)                                                

 

             CHOLESTEROL (BEAKER) (test code = 196 mg/dL                        

      



             631)                                                

 

             HDL CHOLESTEROL (BEAKER) (test code 22 mg/dL                       

        



             = 976)                                              



Calculated LDL not valid if triglyceride &gt;400 mg/dLTriglyceride Reference 
Range:   Low Risk  &lt;150   Borderline    150-199   High Risk     200-499   
Very High Risk  &gt;=500Cholesterol Reference Range:   Low Risk         &lt;200 
 Borderline    200-239    High Risk        &gt;240HDL Cholesterol Reference 
Range:   Low Risk         &gt;=60   High Risk         &lt;40LDL Cholesterol 
ReferenceRange:   Optimal          &lt;100   Near Optimal  100-129   Borderline 
  130-159   High          160-189   Very High       &gt;=190POCT-GLUCOSE METER
2020 00:49:00





             Test Item    Value        Reference Range Interpretation Comments

 

             POC-GLUCOSE METER 399 mg/dL           H            : TESTED A

T [facility] [address]



             (BEAKER) (test code =                                        [place],



             1538)                                               [id number]:



                                                                 /Techni

christina ID



                                                                 = [id number] for MALLIKA

SHARONDACLAY WILSON



RAD, FOOT, 2 VIEWS, [id number] 22:28:00Reason for exam:-&gt;osteomyletitis
FINAL REPORT PATIENT ID:   [id number] TECHNIQUE: Two views each of the bilateral 
feet HISTORY: osteomyletitis. COMPARISON: None. IMPRESSION:No acute displaced 
fracture or dislocation. Joint spaces are within normal limits.Severe soft 
tissue swelling.On the right subcentimeter nonspecific calcifications adjacent 
to the heel plantar aspect. Correlate with physical exam. Severely limited exam 
due to patient body habitus. No definite cortical irregularity.There is clinical
concern for osteomyelitis, recommend MRI with contrast. Signed: Sriram Townsendeport Verified Date/Time:  2020 22:28:25 Reading Location: [address]
Consult Reading Room  Electronically signed by: SRIRAM TOWNSEND DO on 
2020 10:28 PMRAD, FOOT, 2 VIEWS, [id number] 22:28:00Reason for 
exam:-&gt;osteomyletitsFINAL REPORT PATIENT ID:   [id number] TECHNIQUE: Two views 
each of the bilateral feet HISTORY: osteomyletitis. COMPARISON: None. 
IMPRESSION:No acute displaced fracture or dislocation. Joint spaces are within 
normal limits.Severe soft tissue swelling.On the right subcentimeter nonspecific
calcifications adjacent to the heel plantar aspect. Correlate with physical 
exam. Severely limited exam due to patient body habitus. No definite cortical 
irregularity.There is clinical concern for osteomyelitis, recommend MRI with 
contrast. Signed: Sriram Townsend Verified Date/Time:  2020 
22:28:25 Reading Location: [address] Consult Reading Room  Electronically 
signed by: SRIRAM TOWNSEND DO on 2020 10:28 PMPOCT-GLUCOSE METER
2020 21:04:00





             Test Item    Value        Reference Range Interpretation Comments

 

             POC-GLUCOSE METER 430 mg/dL           HH           : Notified

 RN/MD:



             (KUN) (test code =                                        TESTED

 AT [facility] [address]



             1538)                                               [facility],



                                                                 [id number]:



                                                                 /Techni

christina ID



                                                                 = [id number] for DEYSI ADRIAN



ERTAPENEM:SUSC:PT:ISOLATE:ORDQN:[id number] 16:48:00





             Test Item    Value        Reference Range Interpretation Comments

 

             Culture: Urine (test >100,000 CFU/mL Proteus                       

    



             code = Culture: mirabilis 10,000 -                           



             Urine)       50,000 CFU/mL Skin Jaime                           



[facility]:ORDQN:[id number] 16:48:00





             Test Item    Value        Reference Range Interpretation Comments

 

             Proteus mirabilis (test Proteus mirabilis                          

 



             code = Proteus mirabilis)                                        



[facility] AND [id number] 16:48:00





             Test Item    Value        Reference Range Interpretation Comments

 

             UA Nitrite (test code Negative (18 10:48                      

     



             = UA Nitrite) AM)                                    



[facility] AND [id number] 16:48:00





             Test Item    Value        Reference Range Interpretation Comments

 

             UA Bili (test code = Negative *NA*(18                         

  



             UA Bili)     10:48 AM)                              



[facility] AND [id number] 16:48:00





             Test Item    Value        Reference Range Interpretation Comments

 

             UA Ketones (test code Negative *NA*(18                        

   



             = UA Ketones) 10:48 AM)                              



[facility] AND [id number] 16:48:00





             Test Item    Value        Reference Range Interpretation Comments

 

             UA Blood (test code = Trace *ABN*(18                          

 



             UA Blood)    10:48 AM)                              



[facility] AND [id number] 16:48:00





             Test Item    Value        Reference Range Interpretation Comments

 

             UA Urobilinogen (test code = UA 0.2          0.1-1.0               

    



             Urobilinogen)                                        



[facility] AND [id number] 16:48:00





             Test Item    Value        Reference Range Interpretation Comments

 

             UA Leuk Est (test code Large *ABN*(18                         

  



             = UA Leuk Est) 10:48 AM)                              



[facility] AND [id number] 16:48:00





             Test Item    Value        Reference Range Interpretation Comments

 

             UA Protein (test code Negative (18 10:48                      

     



             = UA Protein) AM)                                    



[facility] AND [id number] 16:48:00





             Test Item    Value        Reference Range Interpretation Comments

 

             UA Glucose (test code Negative (18 10:48                      

     



             = UA Glucose) AM)                                    



[facility] AND [id number] 16:48:00





             Test Item    Value        Reference Range Interpretation Comments

 

             UA pH (test code = UA pH) 7.0 1        5.0-8.0                   



[facility] AND [id number] 16:48:00





             Test Item    Value        Reference Range Interpretation Comments

 

             UA Spec Grav (test code = UA Spec 1.015 1                          

      



             Grav)                                               



[facility] AND [id number] 16:48:00





             Test Item    Value        Reference Range Interpretation Comments

 

             UA Color (test code = Yellow *NA*(18                          

 



             UA Color)    10:48 AM)                              



[facility] AND [id number] 16:48:00





             Test Item    Value        Reference Range Interpretation Comments

 

             UA Turbidity (test code = Clear (18 10:48                     

      



             UA Turbidity) AM)                                    



[facility] AND [id number] 16:48:00





             Test Item    Value        Reference Range Interpretation Comments

 

             UA Mucus (test code = UA Mucus) Few /LPF                           

    



[facility] AND [id number] 16:48:00





             Test Item    Value        Reference Range Interpretation Comments

 

             UA Bacteria (test code = UA Few /HPF                               



             Bacteria)                                           



[facility] AND [id number] 16:48:00





             Test Item    Value        Reference Range Interpretation Comments

 

             UA RBC (test code = 0-2 /HPF     See_Comment                [Automa

huma message] The



             UA RBC)                                             system which ge

nerated



                                                                 this result tra

nsmitted



                                                                 reference range

: <=2. The



                                                                 reference range

 was not



                                                                 used to interpr

et this



                                                                 result as zayda

l/abnormal.



[facility] AND [id number] 16:48:00





             Test Item    Value        Reference Range Interpretation Comments

 

             UA Sq Epi (test code = None Seen (18                          

 



             UA Sq Epi)   10:48 AM)                              



[facility] AND [id number] 16:48:00





             Test Item    Value        Reference Range Interpretation Comments

 

             UA WBC (test code = UA WBC)  /HPF                            



[facility] [id number] 11:57:00





             Test Item    Value        Reference Range Interpretation Comments

 

             Antibody Scrn (test Negative (18 5:57                         

  



             code = Antibody Scrn) AM)                                    



[facility] SoBiz10 [id number] 11:57:00





             Test Item    Value        Reference Range Interpretation Comments

 

             ABO/Rh (test code = ABO/Rh) AB POS                                 



[facility] [id number] 11:57:00





             Test Item    Value        Reference Range Interpretation Comments

 

             PTT (test code = PTT) 33.4 s       22.9-35.8                 



[facility] [id number] 11:57:00





             Test Item    Value        Reference Range Interpretation Comments

 

             PT (test code = PT) 13.7 s       12.0-14.7                 



[facility] [id number] 11:57:00





             Test Item    Value        Reference Range Interpretation Comments

 

             INR (test code = INR) 1.05 1       0.85-1.17                 



[facility] [id number] 10:50:01





             Test Item    Value        Reference Range Interpretation Comments

 

             RDW (test code = RDW) 18.9         11.5-14.5                 



[facility] [id number] 10:50:01





             Test Item    Value        Reference Range Interpretation Comments

 

             Hct (test code = Hct) 43.3         42.0-54.0                 



[facility] [id number] 10:50:01





             Test Item    Value        Reference Range Interpretation Comments

 

             WBC (test code = WBC) 9.3          3.7-10.4                  



[facility] [id number] 10:50:01





             Test Item    Value        Reference Range Interpretation Comments

 

             Hgb (test code = Hgb) 14.7         14.0-18.0                 



[facility] [id number] 10:50:01





             Test Item    Value        Reference Range Interpretation Comments

 

             RBC (test code = RBC) 5.36         4.70-6.10                 



[facility] [id number] 10:50:01





             Test Item    Value        Reference Range Interpretation Comments

 

             Eosinophils # (test code 0.2          See_Comment                [A

utomated message] The



             = Eosinophils #)                                        system whic

h generated



                                                                 this result tra

nsmitted



                                                                 reference range

: <=0.5.



                                                                 The reference r

zhen was



                                                                 not used to int

erpret



                                                                 this result as



                                                                 normal/abnormal

.



[facility] [id number] 10:50:01





             Test Item    Value        Reference Range Interpretation Comments

 

             Basophils # (test code 0.1          See_Comment                [Aut

omated message] The



             = Basophils #)                                        system which 

generated



                                                                 this result tra

nsmitted



                                                                 reference range

: <=0.2.



                                                                 The reference r

zhen was



                                                                 not used to int

erpret



                                                                 this result as



                                                                 normal/abnormal

.



[facility] [id number] 10:50:01





             Test Item    Value        Reference Range Interpretation Comments

 

             Lymphocytes # (test code = Lymphocytes 1.8          1.0-5.5        

           



             #)                                                  



[facility] [id number] 10:50:01





             Test Item    Value        Reference Range Interpretation Comments

 

             Monocytes # (test code 0.9          See_Comment                [Aut

omated message] The



             = Monocytes #)                                        system which 

generated



                                                                 this result tra

nsmitted



                                                                 reference range

: <=0.8.



                                                                 The reference r

zhen was



                                                                 not used to int

erpret



                                                                 this result as



                                                                 normal/abnormal

.



[facility] [id number] 10:50:01





             Test Item    Value        Reference Range Interpretation Comments

 

             Neutrophils # (test code = Neutrophils 6.3          1.5-8.1        

           



             #)                                                  



[facility] [id number] 10:50:01





             Test Item    Value        Reference Range Interpretation Comments

 

             Eosinophils (test code = 2.0          See_Comment                [A

utomated message] The



             Eosinophils)                                        system which ge

nerated



                                                                 this result tra

nsmitted



                                                                 reference range

: <=4.0.



                                                                 The reference r

zhen was



                                                                 not used to int

erpret



                                                                 this result as



                                                                 normal/abnormal

.



[facility] [id number] 10:50:01





             Test Item    Value        Reference Range Interpretation Comments

 

             Segs (test code = Segs) 67.4         45.0-75.0                 



[facility] [id number] 10:50:01





             Test Item    Value        Reference Range Interpretation Comments

 

             Lymphocytes (test code = Lymphocytes) 19.9         20.0-40.0       

          



[facility] [id number] 10:50:01





             Test Item    Value        Reference Range Interpretation Comments

 

             Basophils (test code = 1.0          See_Comment                [Aut

omated message] The



             Basophils)                                          system which ge

nerated



                                                                 this result tra

nsmitted



                                                                 reference range

: <=1.0.



                                                                 The reference r

zhen was



                                                                 not used to int

erpret



                                                                 this result as



                                                                 normal/abnormal

.



[facility] [id number] 10:50:01





             Test Item    Value        Reference Range Interpretation Comments

 

             Monocytes (test code = Monocytes) 9.7          2.0-12.0            

      



[facility]2018-11-08 10:50:01





             Test Item    Value        Reference Range Interpretation Comments

 

             eGFR (test code = eGFR) 133                                    



[facility]2018-11-08 10:50:01





             Test Item    Value        Reference Range Interpretation Comments

 

             Calcium Lvl (test code = Calcium Lvl) 9.4          8.5-10.5        

          



[facility]2018-11-08 10:50:01





             Test Item    Value        Reference Range Interpretation Comments

 

             CO2 (test code = CO2) 28           24-32                     



[facility]2018-11-08 10:50:01





             Test Item    Value        Reference Range Interpretation Comments

 

             BUN (test code = BUN) 14           7-22                      



[facility]2018-11-08 10:50:01





             Test Item    Value        Reference Range Interpretation Comments

 

             Glucose Lvl (test code = Glucose Lvl) 89           70-99           

          



[facility]2018-11-08 10:50:01





             Test Item    Value        Reference Range Interpretation Comments

 

             Chloride Lvl (test code = Chloride Lvl) 104                  

            



[facility]2018-11-08 10:50:01





             Test Item    Value        Reference Range Interpretation Comments

 

             Potassium Lvl (test code = Potassium 4.2          3.5-5.1          

         



             Lvl)                                                



[facility]2018-11-08 10:50:01





             Test Item    Value        Reference Range Interpretation Comments

 

             Sodium Lvl (test code = Sodium Lvl) 137          135-145           

        



[facility]2018-11-08 10:50:01





             Test Item    Value        Reference Range Interpretation Comments

 

             Creatinine Lvl (test code = Creatinine 0.85         0.50-1.40      

           



             Lvl)                                                



[facility]2018-11-08 10:50:01





             Test Item    Value        Reference Range Interpretation Comments

 

             AGAP (test code = AGAP) 9.2          10.0-20.0                 



[facility] [id number] 10:50:01





             Test Item    Value        Reference Range Interpretation Comments

 

             ACT (TEG) Rapid (test code = ACT (TEG) 136 s                 

           



             Rapid)                                              



[facility] [id number] 10:50:01





             Test Item    Value        Reference Range Interpretation Comments

 

             Split Point Rapid (test code = Split 0.6 min                       

         



             Point Rapid)                                        



[facility] [id number] 10:50:01





             Test Item    Value        Reference Range Interpretation Comments

 

             R-time Rapid (test code = R-time 0.9 min      0.4-0.7              

     



             Rapid)                                              



[facility] [id number] 10:50:01





             Test Item    Value        Reference Range Interpretation Comments

 

             K-time Rapid (test code = K-time 1.4 min      0.6-2.3              

     



             Rapid)                                              



[facility] [id number] 10:50:01





             Test Item    Value        Reference Range Interpretation Comments

 

             Angle Rapid (test code = Angle 71 degrees   64-80                  

   



             Rapid)                                              



[facility] CampusEccsyreWIOXPTEKCC2337-47-95 10:50:01





             Test Item    Value        Reference Range Interpretation Comments

 

             G-value Rapid (test code = G-value 12.7         5.0-11.6           

       



             Rapid)                                              



[facility] CampusQnthsmcVWTCIVDODF7324-74-89 10:50:01





             Test Item    Value        Reference Range Interpretation Comments

 

             Max Amplitude Rapid (test code = Max 72 mm        52-71            

         



             Amplitude Rapid)                                        



[facility] CampusXtsqywcFRBZFBFOJY3764-15-70 10:50:01





             Test Item    Value        Reference Range Interpretation Comments

 

             Estimated % Lysis Rapid 0.1          See_Comment                [Au

tomated message] The



             (test code = Estimated                                        syste

m which generated



             % Lysis Rapid)                                        this result t

ransmitted



                                                                 reference range

: <=7.5.



                                                                 The reference r

zhen was



                                                                 not used to int

erpret



                                                                 this result as



                                                                 normal/abnormal

.



[facility] CampusFhccpqkBMMQLGTAZR6848-05-11 10:50:01





             Test Item    Value        Reference Range Interpretation Comments

 

             Platelet (test code = Platelet) 367          133-450               

    



[facility] CampusHalrfizPANBPEBDQC0556-08-62 10:50:01





             Test Item    Value        Reference Range Interpretation Comments

 

             MPV (test code = MPV) 7.8          7.4-10.4                  



[facility] CampusUthrensQJUVMFWPRE3513-65-56 10:50:01





             Test Item    Value        Reference Range Interpretation Comments

 

             MCH (test code = MCH) 27.4 pg      27.0-31.0                 



[facility] CampusNzyfedgMAAYWMYDVP0574-94-83 10:50:01





             Test Item    Value        Reference Range Interpretation Comments

 

             MCV (test code = MCV) 80.7         80.0-94.0                 



[facility] CampusLsxjrejBIBZXRAHSX4959-91-08 10:50:01





             Test Item    Value        Reference Range Interpretation Comments

 

             MCHC (test code = MCHC) 34.0         32.0-36.0                 



[facility]2018-05-08 05:42:00





             Test Item    Value        Reference Range Interpretation Comments

 

             B/C Ratio (test code = B/C Ratio) 17 1         6-25                

      



[facility]2018-05-08 05:42:00





             Test Item    Value        Reference Range Interpretation Comments

 

             Globulin (test code = Globulin) 4.3          2.7-4.2               

    



[facility]2018-05-08 05:42:00





             Test Item    Value        Reference Range Interpretation Comments

 

             A/G Ratio (test code = A/G Ratio) 0.7 1        0.7-1.6             

      



[facility]2018-05-08 05:42:00





             Test Item    Value        Reference Range Interpretation Comments

 

             AGAP (test code = AGAP) 14.4         10.0-20.0                 



[facility]2018-05-08 05:42:00





             Test Item    Value        Reference Range Interpretation Comments

 

             eGFR (test code = eGFR) 113                                    



[facility]2018-05-08 05:42:00





             Test Item    Value        Reference Range Interpretation Comments

 

             Alk Phos (test code = Alk Phos) 76                           

    



[facility]2018-05-08 05:42:00





             Test Item    Value        Reference Range Interpretation Comments

 

             ALT (test code = ALT) 35           See_Comment                [Auto

mated message] The



                                                                 system which ge

nerated this



                                                                 result transmit

huma



                                                                 reference range

: <=65. The



                                                                 reference range

 was not



                                                                 used to interpr

et this



                                                                 result as zayda

l/abnormal.



[facility]2018-05-08 05:42:00





             Test Item    Value        Reference Range Interpretation Comments

 

             Albumin Lvl (test code = Albumin Lvl) 2.8          3.5-5.0         

          



[facility]2018-05-08 05:42:00





             Test Item    Value        Reference Range Interpretation Comments

 

             Total Protein (test code = Total 7.1          6.4-8.4              

     



             Protein)                                            



[facility]2018-05-08 05:42:00





             Test Item    Value        Reference Range Interpretation Comments

 

             Calcium Lvl (test code = Calcium Lvl) 8.7          8.5-10.5        

          



[facility]2018-05-08 05:42:00





             Test Item    Value        Reference Range Interpretation Comments

 

             AST (test code = AST) 18           See_Comment                [Auto

mated message] The



                                                                 system which ge

nerated this



                                                                 result transmit

huma



                                                                 reference range

: <=37. The



                                                                 reference range

 was not



                                                                 used to interpr

et this



                                                                 result as zayda

l/abnormal.



[facility]2018-05-08 05:42:00





             Test Item    Value        Reference Range Interpretation Comments

 

             Bili Total (test code = Bili Total) 0.3          0.2-1.3           

        



[facility]2018-05-08 05:42:00





             Test Item    Value        Reference Range Interpretation Comments

 

             Potassium Lvl (test code = Potassium 4.4          3.5-5.1          

         



             Lvl)                                                



[facility]2018-05-08 05:42:00





             Test Item    Value        Reference Range Interpretation Comments

 

             Chloride Lvl (test code = Chloride Lvl) 109                  

            



[facility]2018-05-08 05:42:00





             Test Item    Value        Reference Range Interpretation Comments

 

             CO2 (test code = CO2) 23           24-32                     



[facility]2018-05-08 05:42:00





             Test Item    Value        Reference Range Interpretation Comments

 

             Glucose Lvl (test code = Glucose Lvl) 114          70-99           

          



[facility]2018-05-08 05:42:00





             Test Item    Value        Reference Range Interpretation Comments

 

             Creatinine Lvl (test code = Creatinine 1.01         0.50-1.40      

           



             Lvl)                                                



[facility]2018-05-08 05:42:00





             Test Item    Value        Reference Range Interpretation Comments

 

             BUN (test code = BUN) 17           7-22                      



[facility]2018-05-08 05:42:00





             Test Item    Value        Reference Range Interpretation Comments

 

             Sodium Lvl (test code = Sodium Lvl) 142          135-145           

        



[facility] CampusDvyzahsLOSUHSKIZO0808-00-29 05:42:00





             Test Item    Value        Reference Range Interpretation Comments

 

             Basophils (test code = 0.6          See_Comment                [Aut

omated message] The



             Basophils)                                          system which ge

nerated



                                                                 this result tra

nsmitted



                                                                 reference range

: <=1.0.



                                                                 The reference r

zhen was



                                                                 not used to int

erpret



                                                                 this result as



                                                                 normal/abnormal

.



[facility] CampusWflakrdPFQUMXQOZY7416-11-27 05:42:00





             Test Item    Value        Reference Range Interpretation Comments

 

             Segs-Bands # (test code = Segs-Bands #) 4.8          1.5-8.1       

            



[facility] CampusVpxmhegLHUCESDARU5886-33-62 05:42:00





             Test Item    Value        Reference Range Interpretation Comments

 

             Monocytes # (test code 0.7          See_Comment                [Aut

omated message] The



             = Monocytes #)                                        system which 

generated



                                                                 this result tra

nsmitted



                                                                 reference range

: <=0.8.



                                                                 The reference r

zhen was



                                                                 not used to int

erpret



                                                                 this result as



                                                                 normal/abnormal

.



[facility] CampusNtfuzfaPVTUCUWMBT0228-92-11 05:42:00





             Test Item    Value        Reference Range Interpretation Comments

 

             Lymphocytes # (test code = Lymphocytes 1.9          1.0-5.5        

           



             #)                                                  



[facility] CampusQbyuobnEEVTCWIJRJ6069-50-60 05:42:00





             Test Item    Value        Reference Range Interpretation Comments

 

             Monocytes (test code = Monocytes) 9.4          2.0-12.0            

      



[facility] CampusGpnlxseROFFUSZYWT0400-58-86 05:42:00





             Test Item    Value        Reference Range Interpretation Comments

 

             Eosinophils # (test code 0.2          See_Comment                [A

utomated message] The



             = Eosinophils #)                                        system whic

h generated



                                                                 this result tra

nsmitted



                                                                 reference range

: <=0.5.



                                                                 The reference r

zhen was



                                                                 not used to int

erpret



                                                                 this result as



                                                                 normal/abnormal

.



[facility] CampusIasnrjiADZADZNYIY7262-11-63 05:42:00





             Test Item    Value        Reference Range Interpretation Comments

 

             Eosinophils (test code = 2.9          See_Comment                [A

utomated message] The



             Eosinophils)                                        system which ge

nerated



                                                                 this result tra

nsmitted



                                                                 reference range

: <=4.0.



                                                                 The reference r

zhen was



                                                                 not used to int

erpret



                                                                 this result as



                                                                 normal/abnormal

.



[facility] CampusKkceysxRIXQSZSVMX6257-88-66 05:42:00





             Test Item    Value        Reference Range Interpretation Comments

 

             Segs (test code = Segs) 62.2         45.0-75.0                 



[facility] CampusFfljmgwNACEQJKQLU8270-44-27 05:42:00





             Test Item    Value        Reference Range Interpretation Comments

 

             Lymphocytes (test code = Lymphocytes) 24.9         20.0-40.0       

          



[facility] CampusKlmyjaqKQHVHWGWVJ3293-62-30 05:42:00





             Test Item    Value        Reference Range Interpretation Comments

 

             MCH (test code = MCH) 27.5 pg      27.0-31.0                 



[facility] CampusMuyjlvhQDUNCEQCJW0532-71-94 05:42:00





             Test Item    Value        Reference Range Interpretation Comments

 

             MCV (test code = MCV) 85.3         80.0-94.0                 



[facility] CampusGqlyzlnNTFYWOLXHA8019-12-66 05:42:00





             Test Item    Value        Reference Range Interpretation Comments

 

             Hct (test code = Hct) 43.9         42.0-54.0                 



[facility] CampusXtlnpqwQVJYCTAUPE2752-96-17 05:42:00





             Test Item    Value        Reference Range Interpretation Comments

 

             Hgb (test code = Hgb) 14.2         14.0-18.0                 



[facility] CampusVxvdhsqZXNQUHWIGL5251-84-74 05:42:00





             Test Item    Value        Reference Range Interpretation Comments

 

             WBC (test code = WBC) 7.7          3.7-10.4                  



[facility] CampusRmfuxwkRJTQEHCABB5917-56-90 05:42:00





             Test Item    Value        Reference Range Interpretation Comments

 

             RBC (test code = RBC) 5.15         4.70-6.10                 



[facility] CampusRjmhiooWDIHAISHUM7669-58-81 05:42:00





             Test Item    Value        Reference Range Interpretation Comments

 

             MPV (test code = MPV) 8.4          7.4-10.4                  



[facility] CampusWvhesoaGAPHLEKNRD0483-93-23 05:42:00





             Test Item    Value        Reference Range Interpretation Comments

 

             MCHC (test code = MCHC) 32.3         32.0-36.0                 



[facility] CampusZhtghqfMRMRFLXBPY9681-59-17 05:42:00





             Test Item    Value        Reference Range Interpretation Comments

 

             RDW (test code = RDW) 17.3         11.5-14.5                 



[facility] CampusIacbaasROEVSEZJEC6042-85-05 05:42:00





             Test Item    Value        Reference Range Interpretation Comments

 

             Platelet (test code = Platelet) 317          133-450               

    



[facility]2018-05-07 09:36:00





             Test Item    Value        Reference Range Interpretation Comments

 

             Globulin (test code = Globulin) 4.4          2.7-4.2               

    



[facility]2018-05-07 09:36:00





             Test Item    Value        Reference Range Interpretation Comments

 

             A/G Ratio (test code = A/G Ratio) 0.6 1        0.7-1.6             

      



[facility]2018-05-07 09:36:00





             Test Item    Value        Reference Range Interpretation Comments

 

             B/C Ratio (test code = B/C Ratio) 17 1         6-25                

      



[facility]2018-05-07 09:36:00





             Test Item    Value        Reference Range Interpretation Comments

 

             AGAP (test code = AGAP) 11.3         10.0-20.0                 



[facility]2018-05-07 09:36:00





             Test Item    Value        Reference Range Interpretation Comments

 

             eGFR (test code = eGFR) 134                                    



[facility]2018-05-07 09:36:00





             Test Item    Value        Reference Range Interpretation Comments

 

             Creatinine Lvl (test code = Creatinine 0.84         0.50-1.40      

           



             Lvl)                                                



[facility]2018-05-07 09:36:00





             Test Item    Value        Reference Range Interpretation Comments

 

             Sodium Lvl (test code = Sodium Lvl) 142          135-145           

        



[facility]2018-05-07 09:36:00





             Test Item    Value        Reference Range Interpretation Comments

 

             Glucose Lvl (test code = Glucose Lvl) 99           70-99           

          



[facility]2018-05-07 09:36:00





             Test Item    Value        Reference Range Interpretation Comments

 

             BUN (test code = BUN) 14           7-22                      



[facility]2018-05-07 09:36:00





             Test Item    Value        Reference Range Interpretation Comments

 

             Alk Phos (test code = Alk Phos) 79                           

    



[facility]2018-05-07 09:36:00





             Test Item    Value        Reference Range Interpretation Comments

 

             Bili Total (test code = Bili Total) 0.3          0.2-1.3           

        



[facility]2018-05-07 09:36:00





             Test Item    Value        Reference Range Interpretation Comments

 

             AST (test code = AST) 14           See_Comment                [Auto

mated message] The



                                                                 system which ge

nerated this



                                                                 result transmit

huma



                                                                 reference range

: <=37. The



                                                                 reference range

 was not



                                                                 used to interpr

et this



                                                                 result as zayda

l/abnormal.



[facility]2018-05-07 09:36:00





             Test Item    Value        Reference Range Interpretation Comments

 

             ALT (test code = ALT) 43           See_Comment                [Auto

mated message] The



                                                                 system which ge

nerated this



                                                                 result transmit

huma



                                                                 reference range

: <=65. The



                                                                 reference range

 was not



                                                                 used to interpr

et this



                                                                 result as zayda

l/abnormal.



[facility]2018-05-07 09:36:00





             Test Item    Value        Reference Range Interpretation Comments

 

             Total Protein (test code = Total 7.1          6.4-8.4              

     



             Protein)                                            



[facility]2018-05-07 09:36:00





             Test Item    Value        Reference Range Interpretation Comments

 

             Albumin Lvl (test code = Albumin Lvl) 2.7          3.5-5.0         

          



[facility]2018-05-07 09:36:00





             Test Item    Value        Reference Range Interpretation Comments

 

             Calcium Lvl (test code = Calcium Lvl) 9.2          8.5-10.5        

          



[facility]2018-05-07 09:36:00





             Test Item    Value        Reference Range Interpretation Comments

 

             CO2 (test code = CO2) 21           24-32                     



[facility]2018-05-07 09:36:00





             Test Item    Value        Reference Range Interpretation Comments

 

             Potassium Lvl (test code = Potassium 4.3          3.5-5.1          

         



             Lvl)                                                



[facility]2018-05-07 09:36:00





             Test Item    Value        Reference Range Interpretation Comments

 

             Chloride Lvl (test code = Chloride Lvl) 114                  

            



[facility] CampusObdwnzvSTINZFYJIR6439-22-01 09:36:00





             Test Item    Value        Reference Range Interpretation Comments

 

             MCHC (test code = MCHC) 32.5         32.0-36.0                 



[facility] CampusRkphouxUTZEFTHQUG4201-37-64 09:36:00





             Test Item    Value        Reference Range Interpretation Comments

 

             RDW (test code = RDW) 17.5         11.5-14.5                 



[facility] CampusGkqgfxdXXWAAZXLVW7556-30-54 09:36:00





             Test Item    Value        Reference Range Interpretation Comments

 

             Platelet (test code = Platelet) 400          133-450               

    



[facility] CampusWandjpdVGYZNRIWQA3748-67-92 09:36:00





             Test Item    Value        Reference Range Interpretation Comments

 

             MPV (test code = MPV) 8.5          7.4-10.4                  



[facility] CampusDofqgweDMEGDEIQUS1586-16-28 09:36:00





             Test Item    Value        Reference Range Interpretation Comments

 

             WBC (test code = WBC) 6.6          3.7-10.4                  



[facility] CampusNjqbqkhFQTMNYGPCZ2894-36-28 09:36:00





             Test Item    Value        Reference Range Interpretation Comments

 

             RBC (test code = RBC) 5.17         4.70-6.10                 



[facility] CampusUgszhcbYQREALBDWP4779-66-14 09:36:00





             Test Item    Value        Reference Range Interpretation Comments

 

             MCV (test code = MCV) 86.1         80.0-94.0                 



[facility] CampusZvvaxqeCODCKMBQHR9999-91-14 09:36:00





             Test Item    Value        Reference Range Interpretation Comments

 

             Hct (test code = Hct) 44.5         42.0-54.0                 



[facility] CampusZmirrqiQBRICDHYLN1879-74-91 09:36:00





             Test Item    Value        Reference Range Interpretation Comments

 

             MCH (test code = MCH) 28.0 pg      27.0-31.0                 



[facility] CampusKktrxeiYIGUGRXBNP7227-32-63 09:36:00





             Test Item    Value        Reference Range Interpretation Comments

 

             Hgb (test code = Hgb) 14.5         14.0-18.0                 



[facility] CampusKnocrdhOEXFJLRAXK8835-74-57 09:36:00





             Test Item    Value        Reference Range Interpretation Comments

 

             Lymphocytes # (test code = Lymphocytes 1.7          1.0-5.5        

           



             #)                                                  



[facility] [id number] 09:36:00





             Test Item    Value        Reference Range Interpretation Comments

 

             Monocytes # (test code 0.7          See_Comment                [Aut

omated message] The



             = Monocytes #)                                        system which 

generated



                                                                 this result tra

nsmitted



                                                                 reference range

: <=0.8.



                                                                 The reference r

zhen was



                                                                 not used to int

erpret



                                                                 this result as



                                                                 normal/abnormal

.



[facility] CampusWhllmznAUBXMCZOYH5180-75-20 09:36:00





             Test Item    Value        Reference Range Interpretation Comments

 

             Eosinophils # (test code 0.2          See_Comment                [A

utomated message] The



             = Eosinophils #)                                        system whic

h generated



                                                                 this result tra

nsmitted



                                                                 reference range

: <=0.5.



                                                                 The reference r

zhen was



                                                                 not used to int

erpret



                                                                 this result as



                                                                 normal/abnormal

.



[facility] CampusHgvlnbzFZQHJBQBRA6673-43-25 09:36:00





             Test Item    Value        Reference Range Interpretation Comments

 

             Lymphocytes (test code = Lymphocytes) 26.1         20.0-40.0       

          



[facility] CampusFlqsdeoHZPRECICDC7167-20-18 09:36:00





             Test Item    Value        Reference Range Interpretation Comments

 

             Segs (test code = Segs) 59.3         45.0-75.0                 



[facility] CampusQgcxkqvOQXLLVQLEN8772-19-52 09:36:00





             Test Item    Value        Reference Range Interpretation Comments

 

             Basophils (test code = 0.8          See_Comment                [Aut

omated message] The



             Basophils)                                          system which ge

nerated



                                                                 this result tra

nsmitted



                                                                 reference range

: <=1.0.



                                                                 The reference r

zhen was



                                                                 not used to int

erpret



                                                                 this result as



                                                                 normal/abnormal

.



[facility] CampusEhryftsTXLURZHOYD8368-55-72 09:36:00





             Test Item    Value        Reference Range Interpretation Comments

 

             Monocytes (test code = Monocytes) 10.5         2.0-12.0            

      



[facility] CampusNaeovwaIJTMCOKZBX0198-59-37 09:36:00





             Test Item    Value        Reference Range Interpretation Comments

 

             Eosinophils (test code = 3.3          See_Comment                [A

utomated message] The



             Eosinophils)                                        system which ge

nerated



                                                                 this result tra

nsmitted



                                                                 reference range

: <=4.0.



                                                                 The reference r

zhen was



                                                                 not used to int

erpret



                                                                 this result as



                                                                 normal/abnormal

.



[facility] CampusBagyxckUWNWJOFYAM0043-58-61 09:36:00





             Test Item    Value        Reference Range Interpretation Comments

 

             Segs-Bands # (test code = Segs-Bands #) 3.9          1.5-8.1       

            



[facility]rel[facility]KKKYZCLSKJ2223[facility]00





             Test Item    Value        Reference Range Interpretation Comments

 

             Vanco Tr TND (test code = Vanco Tr 15:30pm                         

       



             TND)                                                



[facility]ct[facility]lPXFJCDJMBQ0775[facility]:02:00





             Test Item    Value        Reference Range Interpretation Comments

 

             Vanco Tr (test code = Vanco Tr) 9.1                                

    



[facility]2018-05-06 07:07:00





             Test Item    Value        Reference Range Interpretation Comments

 

             Glucose Lvl (test code = Glucose Lvl) 74           70-99           

          



[place] 540338-05-06 07:07:00





             Test Item    Value        Reference Range Interpretation Comments

 

             BUN (test code = BUN) 12           7-22                      



[place] 540338-05-06 07:07:00





             Test Item    Value        Reference Range Interpretation Comments

 

             Creatinine Lvl (test code = Creatinine 0.75         0.50-1.40      

           



             Lvl)                                                



[place] 540338-05-06 07:07:00





             Test Item    Value        Reference Range Interpretation Comments

 

             eGFR (test code = eGFR) 140                                    



[place] 540338-05-06 07:07:00





             Test Item    Value        Reference Range Interpretation Comments

 

             Albumin Lvl (test code = Albumin Lvl) 2.9          3.5-5.0         

          



[place] [address]-05-06 07:07:00





             Test Item    Value        Reference Range Interpretation Comments

 

             Globulin (test code = Globulin) 4.4          2.7-4.2               

    



[place] 540338-05-06 07:07:00





             Test Item    Value        Reference Range Interpretation Comments

 

             A/G Ratio (test code = A/G Ratio) 0.7 1        0.7-1.6             

      



[place] [address]-05-06 07:07:00





             Test Item    Value        Reference Range Interpretation Comments

 

             Bili Total (test code = Bili Total) 0.4          0.2-1.3           

        



[place] 540338-05-06 07:07:00





             Test Item    Value        Reference Range Interpretation Comments

 

             Alk Phos (test code = Alk Phos) 71                           

    



[place] 540338-05-06 07:07:00





             Test Item    Value        Reference Range Interpretation Comments

 

             AST (test code = AST) 15           See_Comment                [Auto

mated message] The



                                                                 system which ge

nerated this



                                                                 result transmit

huma



                                                                 reference range

: <=37. The



                                                                 reference range

 was not



                                                                 used to interpr

et this



                                                                 result as zayda

l/abnormal.



[place] 540338-05-06 07:07:00





             Test Item    Value        Reference Range Interpretation Comments

 

             ALT (test code = ALT) 28           See_Comment                [Auto

mated message] The



                                                                 system which ge

nerated this



                                                                 result transmit

huma



                                                                 reference range

: <=65. The



                                                                 reference range

 was not



                                                                 used to interpr

et this



                                                                 result as zayda

l/abnormal.



[place] 540338-05-06 07:07:00





             Test Item    Value        Reference Range Interpretation Comments

 

             Potassium Lvl (test code = Potassium 4.4          3.5-5.1          

         



             Lvl)                                                



[facility]2018-05-06 07:07:00





             Test Item    Value        Reference Range Interpretation Comments

 

             Sodium Lvl (test code = Sodium Lvl) 147          135-145           

        



[place] 540338-05-06 07:07:00





             Test Item    Value        Reference Range Interpretation Comments

 

             CO2 (test code = CO2) 25           24-32                     



[place] 540338-05-06 07:07:00





             Test Item    Value        Reference Range Interpretation Comments

 

             Chloride Lvl (test code = Chloride Lvl) 114                  

            



[place] 540338-05-06 07:07:00





             Test Item    Value        Reference Range Interpretation Comments

 

             B/C Ratio (test code = B/C Ratio) 16 1         6-25                

      



[place] 540338-05-06 07:07:00





             Test Item    Value        Reference Range Interpretation Comments

 

             Calcium Lvl (test code = Calcium Lvl) 8.7          8.5-10.5        

          



[facility]2018-05-06 07:07:00





             Test Item    Value        Reference Range Interpretation Comments

 

             AGAP (test code = AGAP) 12.4         10.0-20.0                 



[facility]2018-05-06 07:07:00





             Test Item    Value        Reference Range Interpretation Comments

 

             Total Protein (test code = Total 7.3          6.4-8.4              

     



             Protein)                                            



[facility] CampusXkgsurgUTSTUEUMCC9218-32-00 07:07:00





             Test Item    Value        Reference Range Interpretation Comments

 

             Platelet (test code = Platelet) 345          133-450               

    



[facility] CampusGnhqxwxCBNDRNNTSK6534-78-25 07:07:00





             Test Item    Value        Reference Range Interpretation Comments

 

             MPV (test code = MPV) 8.7          7.4-10.4                  



[place] 724738-05-06 07:07:00





             Test Item    Value        Reference Range Interpretation Comments

 

             WBC (test code = WBC) 6.9          3.7-10.4                  



[facility] CampusWfbbyfbPNSGSFQSYR3713-93-99 07:07:00





             Test Item    Value        Reference Range Interpretation Comments

 

             RBC (test code = RBC) 5.30         4.70-6.10                 



[place] 724738-05-06 07:07:00





             Test Item    Value        Reference Range Interpretation Comments

 

             MCHC (test code = MCHC) 32.8         32.0-36.0                 



[facility] CampusIsqrpjnIEJOSMLIYR5828-46-16 07:07:00





             Test Item    Value        Reference Range Interpretation Comments

 

             MCH (test code = MCH) 27.9 pg      27.0-31.0                 



[facility] CampusUjjjktoVJIEULQXOZ4878-02-50 07:07:00





             Test Item    Value        Reference Range Interpretation Comments

 

             Hgb (test code = Hgb) 14.8         14.0-18.0                 



[facility] CampusCtatgzgYTPQFEHBZP0226-61-81 07:07:00





             Test Item    Value        Reference Range Interpretation Comments

 

             MCV (test code = MCV) 85.0         80.0-94.0                 



[facility] CampusBmhpffsVNSOUAQQND0401-59-25 07:07:00





             Test Item    Value        Reference Range Interpretation Comments

 

             Hct (test code = Hct) 45.1         42.0-54.0                 



[facility] CampusMpbytzcNKCRRBFZMG8399-42-09 07:07:00





             Test Item    Value        Reference Range Interpretation Comments

 

             RDW (test code = RDW) 17.5         11.5-14.5                 



[facility] CampusFfkqsmiVDWECMNJKB1728-28-80 07:07:00





             Test Item    Value        Reference Range Interpretation Comments

 

             Eosinophils # (test code 0.2          See_Comment                [A

utomated message] The



             = Eosinophils #)                                        system whic

h generated



                                                                 this result tra

nsmitted



                                                                 reference range

: <=0.5.



                                                                 The reference r

zhen was



                                                                 not used to int

erpret



                                                                 this result as



                                                                 normal/abnormal

.



[facility] CampusPwiztjiSNCYSBTTKI9284-61-94 07:07:00





             Test Item    Value        Reference Range Interpretation Comments

 

             Lymphocytes # (test code = Lymphocytes 2.0          1.0-5.5        

           



             #)                                                  



[facility] CampusEidsenuJKVALPUFLC3637-81-53 07:07:00





             Test Item    Value        Reference Range Interpretation Comments

 

             Monocytes # (test code 0.7          See_Comment                [Aut

omated message] The



             = Monocytes #)                                        system which 

generated



                                                                 this result tra

nsmitted



                                                                 reference range

: <=0.8.



                                                                 The reference r

zhen was



                                                                 not used to int

erpret



                                                                 this result as



                                                                 normal/abnormal

.



[facility] CampusUjrtrmhEQDXHSYUYL0101-30-49 07:07:00





             Test Item    Value        Reference Range Interpretation Comments

 

             Eosinophils (test code = 2.4          See_Comment                [A

utomated message] The



             Eosinophils)                                        system which ge

nerated



                                                                 this result tra

nsmitted



                                                                 reference range

: <=4.0.



                                                                 The reference r

zhen was



                                                                 not used to int

erpret



                                                                 this result as



                                                                 normal/abnormal

.



[facility] CampusLwmsdbmUQZYMMFNNK1926-14-28 07:07:00





             Test Item    Value        Reference Range Interpretation Comments

 

             Basophils (test code = 0.6          See_Comment                [Aut

omated message] The



             Basophils)                                          system which ge

nerated



                                                                 this result tra

nsmitted



                                                                 reference range

: <=1.0.



                                                                 The reference r

zhen was



                                                                 not used to int

erpret



                                                                 this result as



                                                                 normal/abnormal

.



[facility] CampusVxyyjokXZONYJELAY1774-87-41 07:07:00





             Test Item    Value        Reference Range Interpretation Comments

 

             Segs-Bands # (test code = Segs-Bands #) 4.0          1.5-8.1       

            



[facility] CampusIzfdtqsLKTCKXIBUY0883-81-17 07:07:00





             Test Item    Value        Reference Range Interpretation Comments

 

             Monocytes (test code = Monocytes) 10.4         2.0-12.0            

      



[facility] CampusJinczhkZTRNSVPHUD6237-98-20 07:07:00





             Test Item    Value        Reference Range Interpretation Comments

 

             RBC Morph (test code = Normal (18 2:07 AM)                     

      



             RBC Morph)                                          



[facility] CampusSxkcvyfRMKNQYNFZL7546-63-76 07:07:00





             Test Item    Value        Reference Range Interpretation Comments

 

             Segs (test code = Segs) 58.1         45.0-75.0                 



[facility] CampusDvwmmjpJSUBHTLBVE9095-66-04 07:07:00





             Test Item    Value        Reference Range Interpretation Comments

 

             Plt Morph (test code = Normal (18 2:07 AM)                     

      



             Plt Morph)                                          



[facility] CampusHhveemhTSMRQKKHBS9411-55-54 07:07:00





             Test Item    Value        Reference Range Interpretation Comments

 

             Lymphocytes (test code = Lymphocytes) 28.5         20.0-40.0       

          



[facility] CampusGsnagkrLNKNXATFXT2592-39-03 16:07:00





             Test Item    Value        Reference Range Interpretation Comments

 

             Basophils # (test code 0.1          See_Comment                [Aut

omated message] The



             = Basophils #)                                        system which 

generated



                                                                 this result tra

nsmitted



                                                                 reference range

: <=0.2.



                                                                 The reference r

zhen was



                                                                 not used to int

erpret



                                                                 this result as



                                                                 normal/abnormal

.



[facility] CampusUurehusZWZTXJLLWL4718-65-22 16:07:00





             Test Item    Value        Reference Range Interpretation Comments

 

             Polychrom (test code = Moderate *ABN*(18                       

    



             Polychrom)   11:07 AM)                              



[facility] TexasTsxbyjcJKFQCLUNRN2114-79-70 06:43:00





             Test Item    Value        Reference Range Interpretation Comments

 

             Vanco Tr TND (test code = Vanco Tr TND) *                          

            



[facility] TexasJakgchbNHKLCSFJIV8963-65-00 06:43:00





             Test Item    Value        Reference Range Interpretation Comments

 

             Vanco Tr (test code = Vanco Tr) 22.3                               

    



[facility]2018-05-04 11:45:00





             Test Item    Value        Reference Range Interpretation Comments

 

             Magnesium Lvl (test code = Magnesium 2.3          1.8-2.4          

         



             Lvl)                                                



[facility]2018-05-04 11:45:00





             Test Item    Value        Reference Range Interpretation Comments

 

             Phosphorus (test code = Phosphorus) 3.5          2.5-4.5           

        



[facility] CampusHrrpasgCFFEAQRLDS8862-24-51 11:45:00





             Test Item    Value        Reference Range Interpretation Comments

 

             PT (test code = PT) 14.0 s       12.0-14.7                 



[facility] CampusMsppnclNZGWHEFCIK9921-77-05 11:45:00





             Test Item    Value        Reference Range Interpretation Comments

 

             PTT (test code = PTT) 37.6 s       22.9-35.8                 



[facility] CampusIgtmoigXDKCXEEWIN4210-30-75 11:45:00





             Test Item    Value        Reference Range Interpretation Comments

 

             INR (test code = INR) 1.08 1       0.85-1.17                 



[facility]FgihrzoUNFNOILGGL0267-17-76 11:45:00





             Test Item    Value        Reference Range Interpretation Comments

 

             C-REACTIVE PROTEIN (test code = 13.1                               

    



             C-REACTIVE PROTEIN)                                        



[facility]oundAuvjnopBSUMRIPWYA9943-30-53 11:45:00





             Test Item    Value        Reference Range Interpretation Comments

 

             Prealbumin (test code = Prealbumin) 25.2         18.0-45.0         

        



[facility]2018-05-04 03:06:00





             Test Item    Value        Reference Range Interpretation Comments

 

             Lactic Acid Lvl (test code = Lactic 0.9          0.5-2.2           

        



             Acid Lvl)                                           



[facility] CampusFynusjbBNRXDNIAEL3779-08-61 03:06:00





             Test Item    Value        Reference Range Interpretation Comments

 

             Sed Rate (test code = 5            See_Comment                [Auto

mated message] The



             Sed Rate)                                           system which ge

nerated this



                                                                 result transmit

huma



                                                                 reference range

: <=15. The



                                                                 reference range

 was not



                                                                 used to interpr

et this



                                                                 result as zayda

l/abnormal.



[facility]oundJkgshzbOFETTDYFMV2608-97-69 03:06:00





             Test Item    Value        Reference Range Interpretation Comments

 

             C-REACTIVE PROTEIN (test code = 15.8                               

    



             C-REACTIVE PROTEIN)                                        



[facility] CampusOybdiciLGDKZRIBRC7785-11-13 00:44:00





             Test Item    Value        Reference Range Interpretation Comments

 

             PT (test code = PT) 13.0 s       12.0-14.7                 



[facility] CampusUxfvsjaAWOVSYYBLF6243-45-44 00:44:00





             Test Item    Value        Reference Range Interpretation Comments

 

             INR (test code = INR) 0.98 1       0.85-1.17                 



[facility] CampusQihlfofUCRPFREHMA0121-38-30 00:44:00





             Test Item    Value        Reference Range Interpretation Comments

 

             PTT (test code = PTT) 33.2 s       22.9-35.8                 



[facility] [id number] 23:51:00





             Test Item    Value        Reference Range Interpretation Comments

 

             Antibody Scrn (test Negative (5/3/18 6:51                          

 



             code = Antibody Scrn) PM)                                    



[facility] [id number] 23:51:00





             Test Item    Value        Reference Range Interpretation Comments

 

             ABO/Rh (test code = ABO/Rh) AB POS                                 



[facility] AND [id number] 23:35:00





             Test Item    Value        Reference Range Interpretation Comments

 

             UA Urobilinogen (test code = UA 0.2          0.1-1.0               

    



             Urobilinogen)                                        



[facility] AND [id number] 23:35:00





             Test Item    Value        Reference Range Interpretation Comments

 

             UA Nitrite (test code Negative (5/3/18 6:35                        

   



             = UA Nitrite) PM)                                    



[facility] AND [id number] 23:35:00





             Test Item    Value        Reference Range Interpretation Comments

 

             UA Glucose (test code Negative (5/3/18 6:35                        

   



             = UA Glucose) PM)                                    



[facility] AND [id number] 23:35:00





             Test Item    Value        Reference Range Interpretation Comments

 

             UA Ketones (test code Negative *NA*(5/3/18                         

  



             = UA Ketones) 6:35 PM)                               



[facility] AND [id number] 23:35:00





             Test Item    Value        Reference Range Interpretation Comments

 

             UA Bili (test code = Negative *NA*(5/3/18                          

 



             UA Bili)     6:35 PM)                               



[facility] AND [id number] 23:35:00





             Test Item    Value        Reference Range Interpretation Comments

 

             UA Blood (test code = Trace *ABN*(5/3/18                           



             UA Blood)    6:35 PM)                               



[facility] AND [id number] 23:35:00





             Test Item    Value        Reference Range Interpretation Comments

 

             UA Leuk Est (test code Small *ABN*(5/3/18 6:35                     

      



             = UA Leuk Est) PM)                                    



[facility] [id number] 23:35:00





             Test Item    Value        Reference Range Interpretation Comments

 

             UA Spec Grav (test code = UA Spec 1.020 1                          

      



             Grav)                                               



[facility] AND [id number] 23:35:00





             Test Item    Value        Reference Range Interpretation Comments

 

             UA pH (test code = UA pH) 6.0 1        5.0-8.0                   



[facility] [id number] 23:35:00





             Test Item    Value        Reference Range Interpretation Comments

 

             UA Color (test code = Yellow *NA*(5/3/18 6:35                      

     



             UA Color)    PM)                                    



[facility] [id number] 23:35:00





             Test Item    Value        Reference Range Interpretation Comments

 

             UA Protein (test code = Trace *ABN*(5/3/18                         

  



             UA Protein)  6:35 PM)                               



[facility] [id number] 23:35:00





             Test Item    Value        Reference Range Interpretation Comments

 

             UA Turbidity (test code Slight Cloudy (5/3/18                      

     



             = UA Turbidity) 6:35 PM)                               



[facility] [id number] 23:35:00





             Test Item    Value        Reference Range Interpretation Comments

 

             UA Hyal Cast 0-2 (5/3/18 6:35 See_Comment                [Automated

 message]



             (test code = UA PM)                                    The system w

[facility]



             Hyal Cast)                                          generated this 

result



                                                                 transmitted ref

erence



                                                                 range: <=2. The



                                                                 reference range

 was



                                                                 not used to int

erpret



                                                                 this result as



                                                                 normal/abnormal

.



[facility] [id number] 23:35:00





             Test Item    Value        Reference Range Interpretation Comments

 

             UA Bacteria (test code = UA Occasional /HPF                        

   



             Bacteria)                                           



[facility] [id number] 23:35:00





             Test Item    Value        Reference Range Interpretation Comments

 

             UA RBC (test code 11-20 /HPF   See_Comment                [Automate

d message] The



             = UA RBC)                                           system which ge

nerated



                                                                 this result tra

nsmitted



                                                                 reference range

: <=2. The



                                                                 reference range

 was not



                                                                 used to interpr

et this



                                                                 result as



                                                                 normal/abnormal

.



[facility] [id number] 23:35:00





             Test Item    Value        Reference Range Interpretation Comments

 

             UA Sq Epi (test code = UA Sq Occasional /LPF                       

    



             Epi)                                                



[facility] AND [id number] 23:35:00





             Test Item    Value        Reference Range Interpretation Comments

 

             UA WBC (test code = UA WBC)  /HPF                            



[id number]





             Test Item    Value        Reference Range Interpretation Comments

 

             Basophils # (test code 0.1          See_Comment                [Aut

omated message] The



             = Basophils #)                                        system which 

generated



                                                                 this result tra

nsmitted



                                                                 reference range

: <=0.2.



                                                                 The reference r

zhen was



                                                                 not used to int

erpret



                                                                 this result as



                                                                 normal/abnormal

.



[id number] 23:20:00





             Test Item    Value        Reference Range Interpretation Comments

 

             Polychrom (test code = Polychrom) Slight                           

      



[facility] [id number] 23:20:00





             Test Item    Value        Reference Range Interpretation Comments

 

             Plt Morph (test code = Normal (5/3/18 6:20 PM)                     

      



             Plt Morph)                                          



[facility] [id number] 16:22:00





             Test Item    Value        Reference Range Interpretation Comments

 

             CULTURE (BEAKER) (test No growth in 5 days                         

  



             code = 1095)                                        



BLOOD [id number] 16:22:00





             Test Item    Value        Reference Range Interpretation Comments

 

             CULTURE (BEAKER) (test No growth in 5 days                         

  



             code = 1095)                                        



(MANUAL DIFFERENTIAL)2017 22:29:00





             Test Item    Value        Reference Range Interpretation Comments

 

             TOTAL COUNTED (BEAKER) (test code =                                

        



             1351)                                               

 

             WBC MORPHOLOGY (BEAKER) (test code = Normal                        

         



             487)                                                

 

             PLT MORPHOLOGY (BEAKER) (test code = Normal                        

         



             486)                                                

 

             RBC MORPHOLOGY (BEAKER) (test code = Normal                        

         



             762)                                                



CBC W/PLT COUNT &amp; AUTO [id number] 22:28:00





             Test Item    Value        Reference Range Interpretation Comments

 

             WHITE BLOOD CELL COUNT (BEAKER) 7.3 K/ L     4.0-10.0              

    



             (test code = 775)                                        

 

             RED BLOOD CELL COUNT (BEAKER) 5.09 M/ L    4.20-5.80               

  



             (test code = 761)                                        

 

             HEMOGLOBIN (BEAKER) (test code = 13.0 GM/DL   13.0-16.8            

     



             410)                                                

 

             HEMATOCRIT (BEAKER) (test code = 42.3 %       40.0-50.0            

     



             411)                                                

 

             MEAN CORPUSCULAR VOLUME (BEAKER) 83.0 fL      82.0-98.0            

     



             (test code = 753)                                        

 

             MEAN CORPUSCULAR HEMOGLOBIN 25.6 pg      27.0-33.0    L            



             (BEAKER) (test code = 751)                                        

 

             MEAN CORPUSCULAR HEMOGLOBIN CONC 30.8 GM/DL   32.0-36.0    L       

     



             (BEAKER) (test code = 752)                                        

 

             RED CELL DISTRIBUTION WIDTH 18.0 %       10.3-14.2    H            



             (BEAKER) (test code = 412)                                        

 

             PLATELET COUNT (BEAKER) (test 331 K/CU MM  150-430                 

  



             code = 756)                                         

 

             MEAN PLATELET VOLUME (BEAKER) 8.5 fL       6.5-10.5                

  



             (test code = 754)                                        

 

             NUCLEATED RED BLOOD CELLS 0 /100 WBC   0-0                       



             (BEAKER) (test code = 413)                                        

 

             NEUTROPHILS RELATIVE PERCENT 65 %                                  

 



             (BEAKER) (test code = 429)                                        

 

             LYMPHOCYTES RELATIVE PERCENT 23 %                                  

 



             (BEAKER) (test code = 430)                                        

 

             MONOCYTES RELATIVE PERCENT 8 %                                    



             (BEAKER) (test code = 431)                                        

 

             EOSINOPHILS RELATIVE PERCENT 3 %                                   

 



             (BEAKER) (test code = 432)                                        

 

             BASOPHILS RELATIVE PERCENT 1 %                                    



             (BEAKER) (test code = 437)                                        

 

             NEUTROPHILS ABSOLUTE COUNT 4.75 K/ L    1.80-8.00                 



             (BEAKER) (test code = 670)                                        

 

             LYMPHOCYTES ABSOLUTE COUNT 1.68 K/ L    1.48-4.50                 



             (BEAKER) (test code = 414)                                        

 

             MONOCYTES ABSOLUTE COUNT (BEAKER) 0.55 K/ L    0.00-1.30           

      



             (test code = 415)                                        

 

             EOSINOPHILS ABSOLUTE COUNT 0.24 K/ L    0.00-0.50                 



             (BEAKER) (test code = 416)                                        

 

             BASOPHILS ABSOLUTE COUNT (BEAKER) 0.05 K/ L    0.00-0.20           

      



             (test code = 417)                                        



[id number] METABOLIC [id number] 11:11:00





             Test Item    Value        Reference Range Interpretation Comments

 

             SODIUM (BEAKER) 138 meq/L    136-145                   



             (test code = 381)                                        

 

             POTASSIUM (BEAKER) 4.0 meq/L    3.5-5.1                   



             (test code = 379)                                        

 

             CHLORIDE (BEAKER) 108 meq/L           H            



             (test code = 382)                                        

 

             CO2 (BEAKER) (test 23 meq/L     22-29                     



             code = 355)                                         

 

             BLOOD UREA NITROGEN 11 mg/dL     7-21                      



             (BEAKER) (test code                                        



             = 354)                                              

 

             CREATININE (BEAKER) 0.74 mg/dL   0.57-1.25                 



             (test code = 358)                                        

 

             GLUCOSE RANDOM 124 mg/dL           H            



             (BEAKER) (test code                                        



             = 652)                                              

 

             CALCIUM (BEAKER) 8.9 mg/dL    8.4-10.2                  



             (test code = 697)                                        

 

             EGFR (BEAKER) (test 150 mL/min/1.73                           ESTIM

ATED GFR IS



             code = 1092) sq m                                   NOT AS ACCURATE

 AS



                                                                 CREATININE



                                                                 CLEARANCE IN



                                                                 PREDICTING



                                                                 GLOMERULAR



                                                                 FILTRATION RATE

.



                                                                 ESTIMATED GFR I

S



                                                                 NOT APPLICABLE 

FOR



                                                                 DIALYSIS PATIEN

TS.



URINE [id number] 09:56:00





             Test Item    Value        Reference Range Interpretation Comments

 

             CULTURE (BEAKER) (test <10,000 col/mL skin                         

  



             code = 1095) jaime                                  



COMPREHENSIVE METABOLIC [id number] 08:49:00





             Test Item    Value        Reference Range Interpretation Comments

 

             TOTAL PROTEIN 7.3 gm/dL    6.0-8.3                   



             (BEAKER) (test code =                                        



             770)                                                

 

             ALBUMIN (BEAKER) 3.3 g/dL     3.5-5.0      L            



             (test code = 1145)                                        

 

             ALKALINE PHOSPHATASE 89 U/L                           



             (BEAKER) (test code =                                        



             346)                                                

 

             BILIRUBIN TOTAL 1.4 mg/dL    0.2-1.2      H            



             (BEAKER) (test code =                                        



             377)                                                

 

             SODIUM (BEAKER) (test 137 meq/L    136-145                   



             code = 381)                                         

 

             POTASSIUM (BEAKER) 3.7 meq/L    3.5-5.1                   



             (test code = 379)                                        

 

             CHLORIDE (BEAKER) 108 meq/L           H            



             (test code = 382)                                        

 

             CO2 (BEAKER) (test 17 meq/L     22-29        L            



             code = 355)                                         

 

             BLOOD UREA NITROGEN 12 mg/dL     7-21                      



             (BEAKER) (test code =                                        



             354)                                                

 

             CREATININE (BEAKER) 0.78 mg/dL   0.57-1.25                 



             (test code = 358)                                        

 

             GLUCOSE RANDOM 119 mg/dL           H            



             (BEAKER) (test code =                                        



             652)                                                

 

             CALCIUM (BEAKER) 8.6 mg/dL    8.4-10.2                  



             (test code = 697)                                        

 

             AST (SGOT) (BEAKER) 13 U/L       5-34                      



             (test code = 353)                                        

 

             ALT (SGPT) (BEAKER) 28 U/L       6-55                      



             (test code = 347)                                        

 

             EGFR (BEAKER) (test 141                                    ESTIMATE

D GFR IS



             code = 1092) mL/min/1.73 sq                           NOT AS ACCURA

TE AS



                          m                                      CREATININE



                                                                 CLEARANCE IN



                                                                 PREDICTING



                                                                 GLOMERULAR



                                                                 FILTRATION RATE

.



                                                                 ESTIMATED GFR I

S



                                                                 NOT APPLICABLE 

FOR



                                                                 DIALYSIS PATIEN

TS.



CBC W/PLT COUNT &amp; AUTO [id number] 08:46:00





             Test Item    Value        Reference Range Interpretation Comments

 

             WHITE BLOOD CELL COUNT (BEAKER) 9.4 K/ L     4.0-10.0              

    



             (test code = 775)                                        

 

             RED BLOOD CELL COUNT (BEAKER) 4.79 M/ L    4.20-5.80               

  



             (test code = 761)                                        

 

             HEMOGLOBIN (BEAKER) (test code = 12.8 GM/DL   13.0-16.8    L       

     



             410)                                                

 

             HEMATOCRIT (BEAKER) (test code = 40.0 %       40.0-50.0            

     



             411)                                                

 

             MEAN CORPUSCULAR VOLUME (BEAKER) 83.4 fL      82.0-98.0            

     



             (test code = 753)                                        

 

             MEAN CORPUSCULAR HEMOGLOBIN 26.7 pg      27.0-33.0    L            



             (BEAKER) (test code = 751)                                        

 

             MEAN CORPUSCULAR HEMOGLOBIN CONC 32.0 GM/DL   32.0-36.0            

     



             (BEAKER) (test code = 752)                                        

 

             RED CELL DISTRIBUTION WIDTH 18.2 %       10.3-14.2    H            



             (BEAKER) (test code = 412)                                        

 

             PLATELET COUNT (BEAKER) (test 313 K/CU MM  150-430                 

  



             code = 756)                                         

 

             MEAN PLATELET VOLUME (BEAKER) 8.6 fL       6.5-10.5                

  



             (test code = 754)                                        

 

             NUCLEATED RED BLOOD CELLS 0 /100 WBC   0-0                       



             (BEAKER) (test code = 413)                                        

 

             NEUTROPHILS RELATIVE PERCENT 70 %                                  

 



             (BEAKER) (test code = 429)                                        

 

             LYMPHOCYTES RELATIVE PERCENT 16 %                                  

 



             (BEAKER) (test code = 430)                                        

 

             MONOCYTES RELATIVE PERCENT 12 %                                   



             (BEAKER) (test code = 431)                                        

 

             EOSINOPHILS RELATIVE PERCENT 1 %                                   

 



             (BEAKER) (test code = 432)                                        

 

             BASOPHILS RELATIVE PERCENT 1 %                                    



             (BEAKER) (test code = 437)                                        

 

             NEUTROPHILS ABSOLUTE COUNT 6.54 K/ L    1.80-8.00                 



             (BEAKER) (test code = 670)                                        

 

             LYMPHOCYTES ABSOLUTE COUNT 1.50 K/ L    1.48-4.50                 



             (BEAKER) (test code = 414)                                        

 

             MONOCYTES ABSOLUTE COUNT (BEAKER) 1.13 K/ L    0.00-1.30           

      



             (test code = 415)                                        

 

             EOSINOPHILS ABSOLUTE COUNT 0.13 K/ L    0.00-0.50                 



             (BEAKER) (test code = 416)                                        

 

             BASOPHILS ABSOLUTE COUNT (BEAKER) 0.06 K/ L    0.00-0.20           

      



             (test code = 417)                                        



0.00URINALYSIS W/ [id number] 20:36:00





             Test Item    Value        Reference Range Interpretation Comments

 

             COLOR (BEAKER) (test code = 470) Yellow                            

     

 

             CLARITY (BEAKER) (test code = 469) Hazy                            

       

 

             SPECIFIC GRAVITY UA (BEAKER) (test 1.012        1.001-1.035        

       



             code = 468)                                         

 

             PH UA (BEAKER) (test code = 467) 5.5          5.0-8.0              

     

 

             PROTEIN UA (BEAKER) (test code = 100 mg/dL    Negative     A       

     



             464)                                                

 

             GLUCOSE UA (BEAKER) (test code = Negative     Negative             

     



             365)                                                

 

             KETONES UA (BEAKER) (test code = Negative     Negative             

     



             371)                                                

 

             BILIRUBIN UA (BEAKER) (test code = Negative     Negative           

       



             462)                                                

 

             BLOOD UA (BEAKER) (test code = 461) Moderate     Negative     A    

        

 

             NITRITE UA (BEAKER) (test code = Negative     Negative             

     



             465)                                                

 

             LEUKOCYTE ESTERASE UA (BEAKER) Large        Negative     A         

   



             (test code = 466)                                        

 

             UROBILINOGEN UA (BEAKER) (test code 3.0 mg/dL    0.2-1.0      H    

        



             = 463)                                              

 

             RBC UA (BEAKER) (test code = 519) 19 /HPF                          

      

 

             WBC UA (BEAKER) (test code = 520) 182 /HPF                         

      

 

             SOURCE(BEAKER) (test code = 5665)                                  

      



BASIC METABOLIC [id number] 17:00:00





             Test Item    Value        Reference Range Interpretation Comments

 

             SODIUM (BEAKER) 140 meq/L    136-145                   



             (test code = 381)                                        

 

             POTASSIUM (BEAKER) 3.7 meq/L    3.5-5.1                   



             (test code = 379)                                        

 

             CHLORIDE (BEAKER) 112 meq/L           H            



             (test code = 382)                                        

 

             CO2 (BEAKER) (test 17 meq/L     22-29        L            



             code = 355)                                         

 

             BLOOD UREA NITROGEN 23 mg/dL     7-21         H            



             (BEAKER) (test code                                        



             = 354)                                              

 

             CREATININE (BEAKER) 1.00 mg/dL   0.57-1.25                 



             (test code = 358)                                        

 

             GLUCOSE RANDOM 102 mg/dL                        



             (BEAKER) (test code                                        



             = 652)                                              

 

             CALCIUM (BEAKER) 8.7 mg/dL    8.4-10.2                  



             (test code = 697)                                        

 

             EGFR (BEAKER) (test 106 mL/min/1.73                           ESTIM

ATED GFR IS



             code = 1092) sq m                                   NOT AS ACCURATE

 AS



                                                                 CREATININE



                                                                 CLEARANCE IN



                                                                 PREDICTING



                                                                 GLOMERULAR



                                                                 FILTRATION RATE

.



                                                                 ESTIMATED GFR I

S



                                                                 NOT APPLICABLE 

FOR



                                                                 DIALYSIS PATIEN

TS.



Specimen slightly ictericCBC W/PLT COUNT &amp; AUTO [id number] 
12:18:00





             Test Item    Value        Reference Range Interpretation Comments

 

             WHITE BLOOD CELL COUNT (BEAKER) 16.4 K/ L    4.0-10.0     H        

    



             (test code = 775)                                        

 

             RED BLOOD CELL COUNT (BEAKER) 5.22 M/ L    4.20-5.80               

  



             (test code = 761)                                        

 

             HEMOGLOBIN (BEAKER) (test code = 14.4 GM/DL   13.0-16.8            

     



             410)                                                

 

             HEMATOCRIT (BEAKER) (test code = 42.9 %       40.0-50.0            

     



             411)                                                

 

             MEAN CORPUSCULAR VOLUME (BEAKER) 82.2 fL      82.0-98.0            

     



             (test code = 753)                                        

 

             MEAN CORPUSCULAR HEMOGLOBIN 27.5 pg      27.0-33.0                 



             (BEAKER) (test code = 751)                                        

 

             MEAN CORPUSCULAR HEMOGLOBIN CONC 33.5 GM/DL   32.0-36.0            

     



             (BEAKER) (test code = 752)                                        

 

             RED CELL DISTRIBUTION WIDTH 16.2 %       10.3-14.2    H            



             (BEAKER) (test code = 412)                                        

 

             PLATELET COUNT (BEAKER) (test 329 K/CU MM  150-430                 

  



             code = 756)                                         

 

             MEAN PLATELET VOLUME (BEAKER) 8.2 fL       6.5-10.5                

  



             (test code = 754)                                        

 

             NUCLEATED RED BLOOD CELLS 0 /100 WBC   0-0                       



             (BEAKER) (test code = 413)                                        

 

             NEUTROPHILS RELATIVE PERCENT 83 %                                  

 



             (BEAKER) (test code = 429)                                        

 

             LYMPHOCYTES RELATIVE PERCENT 7 %                                   

 



             (BEAKER) (test code = 430)                                        

 

             MONOCYTES RELATIVE PERCENT 9 %                                    



             (BEAKER) (test code = 431)                                        

 

             EOSINOPHILS RELATIVE PERCENT 0 %                                   

 



             (BEAKER) (test code = 432)                                        

 

             BASOPHILS RELATIVE PERCENT 0 %                                    



             (BEAKER) (test code = 437)                                        

 

             NEUTROPHILS ABSOLUTE COUNT 1.62 K/ L    1.80-8.00    L            



             (BEAKER) (test code = 670)                                        

 

             LYMPHOCYTES ABSOLUTE COUNT 1.15 K/ L    1.48-4.50    L            



             (BEAKER) (test code = 414)                                        

 

             MONOCYTES ABSOLUTE COUNT (BEAKER) 1.56 K/ L    0.00-1.30    H      

      



             (test code = 415)                                        

 

             EOSINOPHILS ABSOLUTE COUNT 0.01 K/ L    0.00-0.50                 



             (BEAKER) (test code = 416)                                        

 

             BASOPHILS ABSOLUTE COUNT (BEAKER) 0.02 K/ L    0.00-0.20           

      



             (test code = 417)                                        



(MANUAL DIFFERENTIAL)2017 12:18:00





             Test Item    Value        Reference Range Interpretation Comments

 

             TOTAL COUNTED (BEAKER) (test code =                                

        



             1351)                                               

 

             WBC MORPHOLOGY (BEAKER) (test code = Normal                        

         



             487)                                                

 

             PLT MORPHOLOGY (BEAKER) (test code = Normal                        

         



             486)                                                

 

             RBC MORPHOLOGY (BEAKER) (test code = Normal                        

         



             762)

## 2022-03-15 ENCOUNTER — HOSPITAL ENCOUNTER (OUTPATIENT)
Dept: HOSPITAL 97 - OR | Age: 37
Discharge: HOME | End: 2022-03-15
Attending: INTERNAL MEDICINE
Payer: COMMERCIAL

## 2022-03-15 VITALS — TEMPERATURE: 96.4 F | DIASTOLIC BLOOD PRESSURE: 59 MMHG | SYSTOLIC BLOOD PRESSURE: 106 MMHG | OXYGEN SATURATION: 97 %

## 2022-03-15 VITALS — DIASTOLIC BLOOD PRESSURE: 67 MMHG | TEMPERATURE: 97.6 F | OXYGEN SATURATION: 94 % | SYSTOLIC BLOOD PRESSURE: 110 MMHG

## 2022-03-15 DIAGNOSIS — Z88.1: ICD-10-CM

## 2022-03-15 DIAGNOSIS — E11.9: ICD-10-CM

## 2022-03-15 DIAGNOSIS — I10: ICD-10-CM

## 2022-03-15 DIAGNOSIS — Z88.6: ICD-10-CM

## 2022-03-15 DIAGNOSIS — E66.9: ICD-10-CM

## 2022-03-15 DIAGNOSIS — R10.9: ICD-10-CM

## 2022-03-15 DIAGNOSIS — Z20.822: ICD-10-CM

## 2022-03-15 DIAGNOSIS — Z88.3: ICD-10-CM

## 2022-03-15 DIAGNOSIS — K29.50: Primary | ICD-10-CM

## 2022-03-15 DIAGNOSIS — Z79.84: ICD-10-CM

## 2022-03-15 DIAGNOSIS — R19.7: ICD-10-CM

## 2022-03-15 PROCEDURE — 0DB88ZX EXCISION OF SMALL INTESTINE, VIA NATURAL OR ARTIFICIAL OPENING ENDOSCOPIC, DIAGNOSTIC: ICD-10-PCS

## 2022-03-15 PROCEDURE — 0DB68ZX EXCISION OF STOMACH, VIA NATURAL OR ARTIFICIAL OPENING ENDOSCOPIC, DIAGNOSTIC: ICD-10-PCS

## 2022-03-15 PROCEDURE — 43239 EGD BIOPSY SINGLE/MULTIPLE: CPT

## 2022-03-15 PROCEDURE — 82947 ASSAY GLUCOSE BLOOD QUANT: CPT

## 2022-03-15 PROCEDURE — 88305 TISSUE EXAM BY PATHOLOGIST: CPT

## 2022-03-15 PROCEDURE — 88312 SPECIAL STAINS GROUP 1: CPT

## 2022-03-15 NOTE — RAD REPORT
EXAM DESCRIPTION:  CT - Abdomen   Pelvis W Contrast - 3/15/2022 6:26 am

 

CLINICAL HISTORY:  ABD PAIN

 

COMPARISON:  11/22/2021

 

TECHNIQUE:  CT of the abdomen and pelvis performed following IV administration of   iodinated contras
t. This exam was performed according to our departmental dose-optimization program, which includes au
tomated exposure control, adjustment of the mA and/or kV according to patient size and/or use of iter
ative reconstruction technique.

 

FINDINGS:  Lung Bases: The visualized   lung bases are clear.

Bones: No destructive bone lesions identified.

Abdomen:

Liver: The liver has normal size and decreased density. No intrahepatic biliary dilatation.

Gallbladder: Prior cholecystectomy.

Spleen, Pancreas, and Adrenal Glands:   The spleen, pancreas, and adrenal glands are unremarkable.

Kidneys:   No hydronephrosis or obstructing calculus.

Vasculature: The aorta and IVC have normal caliber and position.   The portal vein is patent. The pro
ximal visceral and renal arteries are patent.

Stomach:   The stomach and duodenum have normal course.

Other:   No free intraperitoneal air.   Mild bilateral inguinal lymphadenopathy. Ventriculoperitoneal
 shunt tubing partially visualized.

Pelvis:

Bladder:   Suprapubic catheter in the urinary bladder. Wall thickening of the urinary bladder which i
s decompressed.

Bowel:   No dilated loops of large or small bowel.

Appendix:   Normal appendix.

Pelvis: Prostate is not enlarged.

 

IMPRESSION:  1.   Wall thickening of the urinary bladder which is decompressed. This could be seen wi
th cystitis or may be related to underdistention.

2.   Suprapubic catheter in the urinary bladder.

3.   Hepatic steatosis.

4.   Mild bilateral inguinal lymphadenopathy. This may be reactive.

 

Electronically signed by:   Yoni Donnelly   3/15/2022 1:15 AM CDT Workstation: [id number]

 

 

Due to temporary technical issues with the PACS/Fluency reporting system, reports are being signed by
 the in house radiologists without review as a courtesy to insure prompt reporting. The interpreting 
radiologist is fully responsible for the content of the report.

## 2022-03-15 NOTE — EDPHYS
Physician Documentation                                                                           

 [facility]                                                                 

Name: Redd Dale Jr                                                                            

Age: 36 yrs                                                                                       

Sex: Male                                                                                         

: 1985                                                                                   

MRN: [id number]                                                                                   

Arrival Date: 2022                                                                          

Time: 20:43                                                                                       

Account#: [id number]                                                                            

Bed 17                                                                                            

Private MD:                                                                                       

KRZYSZTOF Physician Washington Boyle                                                                      

HPI:                                                                                              

                                                                                             

21:30 This 36 yrs old Black Male presents to ER via Unassigned with complaints of Abd Pain >  cp  

      49 y/o.                                                                                     

21:30 The patient presents with abdominal pain in the upper abdomen. Onset: The               cp  

      symptoms/episode began/occurred 2 week(s) ago. The symptoms do not radiate. Associated      

      signs and symptoms: Pertinent positives: nausea and vomiting, Pertinent negatives:          

      chest pain, constipation, diarrhea, fever. The symptoms are described as constant.          

      Severity of pain: in the emergency department the pain is unchanged despite home            

      interventions.                                                                              

                                                                                                  

Historical:                                                                                       

- Allergies:                                                                                      

23:23 Amoxicillin;                                                                            sv1 

23:23 Bactrim;                                                                                sv1 

23:23 Ciprofloxacin;                                                                          sv1 

23:23 CLAVULANIC ACID;                                                                        sv1 

23:23 Demerol;                                                                                sv1 

23:23 Doxycycline;                                                                            sv1 

23:23 Levofloxacin;                                                                           sv1 

23:23 Morphine;                                                                               sv1 

23:23 PENICILLINS;                                                                            sv1 

23:23 Toradol;                                                                                sv1 

23:23 TRIMETHOPRIM;                                                                           sv1 

23:23 Vancomycin;                                                                             sv1 

23:23 Zofran;                                                                                 sv1 

- Home Meds:                                                                                      

23:23 Celexa 20 mg Oral tab 1 tab once daily [Active]; fentanyl 25 mcg/hr Topical pt72 1      sv1 

      patch every 72 hours [Active]; Pepcid 20 mg Oral tab 1 tab once daily [Active];             

      Percocet  mg Oral tab 1 tab every 6 hours [Active]; Reglan 10 mg Oral tab 1 tab       

      once daily [Active]; Wellbutrin  mg Oral TbER 1 tab 2 times per day [Active];         

- PMHx:                                                                                           

23:23 Asthma; Cerebral Palsy; cluster headaches; decubitus ulcers on feet; GERD;              sv1 

      Hydrocephalus; Hypertension; spina bifida;                                                  

                                                                                                  

- Immunization history:: Adult Immunizations up to date, Client reports receiving the             

  2nd dose of the Covid vaccine.                                                                  

- Social history:: Smoking status: Patient reports the use of cigarette tobacco                   

  products, Patient uses.                                                                         

                                                                                                  

                                                                                                  

ROS:                                                                                              

21:31 Eyes: Negative for injury, pain, redness, and discharge.                                cp  

21:31 Constitutional: Negative for body aches, chills, fever, poor PO intake.                     

21:31 ENT: Negative for ear pain, sore throat, difficulty swallowing, difficulty handling         

      secretions.                                                                                 

21:31 Cardiovascular: Negative for chest pain, palpitations.                                      

21:31 Respiratory: Negative for cough, shortness of breath, wheezing.                             

21:31 Abdomen/GI: Positive for abdominal pain, nausea and vomiting, Negative for diarrhea,        

      constipation, anorexia.                                                                     

21:31 Back: Negative for radiated pain.                                                           

21:31 Neuro: Negative for altered mental status, headache.                                        

21:31 All other systems are negative.                                                             

                                                                                                  

Exam:                                                                                             

21:32 Head/Face:  Normocephalic, atraumatic.                                                  cp  

21:32 Constitutional: The patient appears in no acute distress, alert, awake, non-toxic, well     

      developed, well nourished, obese.                                                           

21:32 Eyes: Periorbital structures: appear normal, Conjunctiva: normal, no exudate, no            

      injection, Sclera: no appreciated abnormality, Lids and lashes: appear normal,              

      bilaterally.                                                                                

21:32 ENT: External ear(s): are unremarkable, Nose: is normal, Posterior pharynx: Airway: no      

      evidence of obstruction, patent.                                                            

21:32 Chest/axilla: Inspection: normal.                                                           

21:32 Respiratory: the patient does not display signs of respiratory distress,  Respirations:     

      normal, no use of accessory muscles, no retractions, Breath sounds: are clear               

      throughout, no decreased breath sounds, no stridor, no wheezing.                            

21:32 Abdomen/GI: Inspection: obese Bowel sounds: active, all quadrants, Palpation: soft, in      

      all quadrants, moderate abdominal tenderness, in the right upper quadrant and left          

      upper quadrant, rebound tenderness, is not appreciated, voluntary guarding, is elicited     

      in the right upper quadrant and left upper quadrant.                                        

21:32 Back: pain, is absent, ROM is normal.                                                       

21:32 Neuro: Orientation: to person, place \T\ time. Mentation: is normal.                        

                                                                                                  

Vital Signs:                                                                                      

20:40  / 93 LA Sitting (auto/reg); Pulse 96 MON; Resp 19 S; Temp 98.7; Pulse Ox 98% ;   sv1 

23:28  / 106 LA Sitting (auto/); Pulse 90 MON; Resp 22 S; Pulse Ox 99% on R/A; Pain     sv1 

      710;                                                                                       

03/15                                                                                             

01:02  / 73 LA Sitting (auto/reg); Pulse 85 MON; Resp 18 S; Pulse Ox 99% on R/A;        sv1 

02:08  / 67 LA Sitting (auto/reg); Pulse 86 MON; Resp 17; Temp 97.6; Pulse Ox 94% on    sv1 

      R/A;                                                                                        

                                                                                                  

MDM:                                                                                              

                                                                                             

20:47 Patient medically screened.                                                             cp  

21:34 Differential diagnosis: bowel obstruction, gastritis, non-specific abd pain,            cp  

      pancreatitis, Pyelonephritis, Ureterolithiasis.                                             

03/15                                                                                             

01:29 Data reviewed: vital signs, nurses notes, lab test result(s), radiologic studies, CT    jr8 

      scan. Data interpreted: Pulse oximetry: on room air is 99 %. Interpretation: normal.        

      Counseling: I had a detailed discussion with the patient and/or guardian regarding: the     

      historical points, exam findings, and any diagnostic results supporting the                 

      discharge/admit diagnosis, lab results, radiology results, the need for outpatient          

      follow up, a family practitioner, a gastroenterologist, to return to the emergency          

      department if symptoms worsen or persist or if there are any questions or concerns that     

      arise at home. Response to treatment: the patient's symptoms have markedly improved         

      after treatment. Special discussion: Based on the patient's Hx, exam, and Dx                

      evaluation, there is no indication for emergent surgery or inpatient Tx. It is              

      understood by the patient/guardian that if the Sx's persist or worsen they need to          

      return immediately for re-evaluation.                                                       

                                                                                                  

                                                                                             

20:49 Order name: Basic Metabolic Panel; Complete Time: 23:15                                 cp  

                                                                                             

20:49 Order name: CBC with Diff; Complete Time: 23:15                                         cp  

                                                                                             

20:49 Order name: Hepatic Function; Complete Time: 23:15                                      cp  

                                                                                             

20:49 Order name: Lipase; Complete Time: 23:15                                                cp  

                                                                                             

20:49 Order name: CT Abd/Pelvis - IV Contrast Only                                            cp  

                                                                                             

20:49 Order name: IV Saline Lock; Complete Time: 22:23                                        cp  

                                                                                             

20:49 Order name: Labs collected and sent; Complete Time: 22:23                               cp  

                                                                                                  

Administered Medications:                                                                         

                                                                                             

22:36 Drug: Phenergan (promethazine) 25 mg Route: IVP; Site: right forearm;                   sv1 

03/15                                                                                             

00:20 Follow up: Response: No adverse reaction; Nausea is decreased                           sv1 

01:54 Follow up: Response: No adverse reaction; Nausea is decreased                           sv1 

                                                                                             

22:36 Drug: Dilaudid (HYDROmorphone) 1 mg Route: IVP; Site: right forearm;                    sv1 

03/15                                                                                             

00:20 Follow up: Response: Pain is decreased                                                  sv1 

01:54 Follow up: Response: No adverse reaction; Pain is decreased                             sv1 

                                                                                             

22:36 Drug: NS 0.9% 1000 ml Route: IV; Rate: 1000 ml/hr; Site: right forearm;                 sv1 

03/15                                                                                             

00:19 Follow up: Response: No adverse reaction; IV Status: Completed infusion                 sv1 

01:53 Follow up: Response: No adverse reaction; IV Status: Completed infusion                 sv1 

02:07 Drug: Dilaudid (HYDROmorphone) 1 mg Route: IVP; Site: right forearm;                    sv1 

02:16 Follow up: Response: No adverse reaction; Pain is decreased                             sv1 

02:07 Drug: Phenergan (promethazine) 25 mg Route: IVP; Site: right forearm;                   sv1 

02:15 Follow up: Response: No adverse reaction; Nausea is decreased                           sv1 

                                                                                                  

                                                                                                  

Disposition:                                                                                      

05:59 Co-signature as Attending Physician, Washington Boyle MD.                                 7 

                                                                                                  

Disposition Summary:                                                                              

03/15/22 01:30                                                                                    

Discharge Ordered                                                                                 

      Location: Home                                                                          jr8 

      Problem: new                                                                            jr8 

      Symptoms: have improved                                                                 jr8 

      Condition: Stable                                                                       jr8 

      Diagnosis                                                                                   

        - Abdominal pain, Generalized                                                         jr8 

      Followup:                                                                               jr8 

        - With: Private Physician                                                                  

        - When: 2 - 3 days                                                                         

        - Reason: Recheck today's complaints, Continuance of care, Re-evaluation by your           

      physician                                                                                   

      Discharge Instructions:                                                                     

        - Discharge Summary Sheet                                                             jr8 

        - Abdominal Pain, Adult                                                               jr8 

      Forms:                                                                                      

        - Medication Reconciliation Form                                                      jr8 

        - Thank You Letter                                                                    jr8 

        - Antibiotic Education                                                                jr8 

        - Prescription Opioid Use                                                             jr8 

        - SBAR form                                                                           tw5 

Signatures:                                                                                       

Dispatcher [employer]                           Sunday Pena PA PA   jr8                                                  

Luis Ervin PA PA cp Holmes, Maurice, MD MD   7                                                  

Hi Andersen RN                   RN   sv1                                                  

                                                                                                  

**************************************************************************************************

## 2022-03-15 NOTE — OP
Surgeon:  Nain Cordova MD



Procedure Performed:  Esophagogastroduodenoscopy.



Indication For Procedure:  Abdominal pain, diarrhea.



Plan For Anesthesia:  Monitored anesthesia care.



Complexity:  Average.



Technique:  After obtaining informed consent from the patient and explaining risks and complications,
 which include, but are not limited to bleeding, infection, perforation, and anesthesia complication,
 the patient was placed in the left lateral position.  Sedation was given.  From then on, the scope w
as advanced to the mouth and carefully guided up till the third portion of the duodenum.  After compl
etion of examination, scope and equipment were withdrawn and procedure terminated in a safe manner.



Findings:  Esophagus:  No gross lesion seen in the entire esophagus. 



In the stomach, there was a moderate amount of gastric retention seen in the fundus and body.  Theref
ore, visualization was not very accurate.  Some residual food was also seen in the antrum; however, m
ild patchy erythema seen in the body and antrum as well.  Biopsies were taken.  I was able to intubat
e the pylorus and did not pose any resistance, which indicates that there was no significant pyloric 
stenosis. 



Duodenum:  The bulb and second portion appeared normal.  Small bowel biopsies were taken.



Complications:  None.



Tolerance To Anesthesia:  Excellent.



Postoperative Diagnoses:  Gastric retention, gastritis.



Plan:  

1.Await pathology results.

2.Low-fiber diet, low residue diet, small frequent meals.

3.Oral PPI.

4.Follow up in the GI clinic in 2 weeks.





US/MODL

DD:  03/15/2022 08:37:03Voice ID:  [id number]

DT:  03/15/2022 09:29:32Report ID:  [id number]

## 2022-03-15 NOTE — ER
Nurse's Notes                                                                                     

 [facility]                                                                 

Name: Redd Dale Jr                                                                            

Age: 36 yrs                                                                                       

Sex: Male                                                                                         

: 1985                                                                                   

MRN: [id number]                                                                                   

Arrival Date: 2022                                                                          

Time: 20:43                                                                                       

Account#: [id number]                                                                            

Bed 17                                                                                            

Private MD:                                                                                       

Diagnosis: Abdominal pain, Generalized                                                            

                                                                                                  

Presentation:                                                                                     

03/15                                                                                             

00:47 Chief complaint: Patient states: abd pain. Coronavirus screen: Client denies travel out sv1 

      of the [place] in the last 14 days. Ebola Screen: No symptoms or risks identified at this      

      time. Initial Sepsis Screen: Does the patient meet any 2 criteria? No. Patient's            

      initial sepsis screen is negative. Does the patient have a suspected source of              

      infection? No. Patient's initial sepsis screen is negative. Risk Assessment: Do you         

      want to hurt yourself or someone else? Patient reports no desire to harm self or            

      others. Onset of symptoms was 2022.                                               

00:47 Acuity: AYESHA 3                                                                           sv1 

00:47 Method Of Arrival: EMS: [facility]                                                    sv1 

                                                                                                  

Triage Assessment:                                                                                

00:47 General: Appears distressed, uncomfortable, obese, Behavior is cooperative, appropriate sv1 

      for age.                                                                                    

                                                                                                  

Historical:                                                                                       

- Allergies:                                                                                      

                                                                                             

23:23 Amoxicillin;                                                                            sv1 

23:23 Bactrim;                                                                                sv1 

23:23 Ciprofloxacin;                                                                          sv1 

23:23 CLAVULANIC ACID;                                                                        sv1 

23:23 Demerol;                                                                                sv1 

23:23 Doxycycline;                                                                            sv1 

23:23 Levofloxacin;                                                                           sv1 

23:23 Morphine;                                                                               sv1 

23:23 PENICILLINS;                                                                            sv1 

23:23 Toradol;                                                                                sv1 

23:23 TRIMETHOPRIM;                                                                           sv1 

23:23 Vancomycin;                                                                             sv1 

23:23 Zofran;                                                                                 sv1 

- Home Meds:                                                                                      

23:23 Celexa 20 mg Oral tab 1 tab once daily [Active]; fentanyl 25 mcg/hr Topical pt72 1      sv1 

      patch every 72 hours [Active]; Pepcid 20 mg Oral tab 1 tab once daily [Active];             

      Percocet  mg Oral tab 1 tab every 6 hours [Active]; Reglan 10 mg Oral tab 1 tab       

      once daily [Active]; Wellbutrin  mg Oral TbER 1 tab 2 times per day [Active];         

- PMHx:                                                                                           

23:23 Asthma; Cerebral Palsy; cluster headaches; decubitus ulcers on feet; GERD;              sv1 

      Hydrocephalus; Hypertension; spina bifida;                                                  

                                                                                                  

- Immunization history:: Adult Immunizations up to date, Client reports receiving the             

  2nd dose of the Covid vaccine.                                                                  

- Social history:: Smoking status: Patient reports the use of cigarette tobacco                   

  products, Patient uses.                                                                         

                                                                                                  

                                                                                                  

Screenin:23 Abuse screen: Denies threats or abuse. Nutritional screening: No deficits noted.        sv1 

      Tuberculosis screening: No symptoms or risk factors identified. Fall Risk None              

      identified. No fall in past 12 months (0 pts). Secondary diagnosis (15 points) IV           

      access (20 points). Ambulatory Aid- Crutches/Cane/Walker (15 pts). Gait- Impaired (20       

      pts.). Mental Status- Oriented to own ability (0 pts).                                      

                                                                                                  

Assessment:                                                                                       

23:22 Pain: Complains of pain in abdomen Pain radiates to back. GI: Bowel sounds present X 4  sv1 

      quads. Abdomen is tender to palpation X 4 quads.                                            

23:30 Reassessment: CT completed. Results pending..                                           sv1 

03/15                                                                                             

02:20 Reassessment: Cleared for discharge to home by the provider. Medicated for pain. Pain   sv1 

      decreased. .                                                                                

                                                                                                  

Vital Signs:                                                                                      

                                                                                             

20:40  / 93 LA Sitting (auto/reg); Pulse 96 MON; Resp 19 S; Temp 98.7; Pulse Ox 98% ;   sv1 

23:28  / 106 LA Sitting (auto/); Pulse 90 MON; Resp 22 S; Pulse Ox 99% on R/A; Pain     sv1 

      7/10;                                                                                       

03/15                                                                                             

01:02  / 73 LA Sitting (auto/reg); Pulse 85 MON; Resp 18 S; Pulse Ox 99% on R/A;        sv1 

02:08  / 67 LA Sitting (auto/reg); Pulse 86 MON; Resp 17; Temp 97.6; Pulse Ox 94% on    sv1 

      R/A;                                                                                        

                                                                                                  

ED Course:                                                                                        

                                                                                             

20:43 Patient arrived in ED.                                                                  tw5 

20:44 Luis Ervin PA is PHCP.                                                                cp  

20:44 Washington Boyle MD is Attending Physician.                                             cp  

21:22 Hi Andersen, RN is Primary Nurse.                                                 sv1 

22:23 Basic Metabolic Panel Sent.                                                             sv1 

22:23 CBC with Diff Sent.                                                                     sv1 

22:23 Hepatic Function Sent.                                                                  sv1 

22:23 Lipase Sent.                                                                            sv1 

22:36 PHCP role handed off by Luis Ervin PA jr8 

22:36 Sunday Mosher PA is PHCP.                                                               [id number] 

23:23 Patient has correct armband on for positive identification. Placed in gown. Call light  sv1 

      in reach. Side rails up X2.                                                                 

23:53 CT Abd/Pelvis - IV Contrast Only In Process Unspecified.                                EDMS

03/15                                                                                             

00:47 Arm band placed on right wrist.                                                         sv1 

00:48 Triage completed.                                                                       sv1 

02:08 No provider procedures requiring assistance completed. IV discontinued.                 sv1 

                                                                                                  

Administered Medications:                                                                         

                                                                                             

22:36 Drug: Phenergan (promethazine) 25 mg Route: IVP; Site: right forearm;                   sv1 

03/15                                                                                             

00:20 Follow up: Response: No adverse reaction; Nausea is decreased                           sv1 

01:54 Follow up: Response: No adverse reaction; Nausea is decreased                           sv1 

                                                                                             

22:36 Drug: Dilaudid (HYDROmorphone) 1 mg Route: IVP; Site: right forearm;                    sv1 

03/15                                                                                             

00:20 Follow up: Response: Pain is decreased                                                  sv1 

01:54 Follow up: Response: No adverse reaction; Pain is decreased                             sv1 

03                                                                                             

22:36 Drug: NS 0.9% 1000 ml Route: IV; Rate: 1000 ml/hr; Site: right forearm;                 sv1 

03/15                                                                                             

00:19 Follow up: Response: No adverse reaction; IV Status: Completed infusion                 sv1 

01:53 Follow up: Response: No adverse reaction; IV Status: Completed infusion                 sv1 

02:07 Drug: Dilaudid (HYDROmorphone) 1 mg Route: IVP; Site: right forearm;                    sv1 

02:16 Follow up: Response: No adverse reaction; Pain is decreased                             sv1 

02:07 Drug: Phenergan (promethazine) 25 mg Route: IVP; Site: right forearm;                   sv1 

02:15 Follow up: Response: No adverse reaction; Nausea is decreased                           sv1 

                                                                                                  

                                                                                                  

Outcome:                                                                                          

01:30 Discharge ordered by MD. abraham 

02:08 Discharged to home via ambulance.                                                       sv1 

02:08 Condition: improved                                                                         

02:08 Discharge instructions given to patient.                                                    

02:23 Patient left the ED.                                                                    sv1 

                                                                                                  

Signatures:                                                                                       

Dispatcher [employer]                           EDMS                                                 

Sunday Mosher PA PA jr8 Page, Corey, PA PA cp Wood, Tiffany                                tw5                                                  

Hi Andersen, RN                   RN   sv1                                                  

                                                                                                  

**************************************************************************************************

## 2022-03-18 ENCOUNTER — HOSPITAL ENCOUNTER (EMERGENCY)
Dept: HOSPITAL 97 - ER | Age: 37
Discharge: HOME | End: 2022-03-18
Payer: COMMERCIAL

## 2022-03-18 DIAGNOSIS — I10: ICD-10-CM

## 2022-03-18 DIAGNOSIS — Z88.5: ICD-10-CM

## 2022-03-18 DIAGNOSIS — Z88.0: ICD-10-CM

## 2022-03-18 DIAGNOSIS — Z88.8: ICD-10-CM

## 2022-03-18 DIAGNOSIS — Z88.1: ICD-10-CM

## 2022-03-18 DIAGNOSIS — Z88.3: ICD-10-CM

## 2022-03-18 DIAGNOSIS — Z98.2: ICD-10-CM

## 2022-03-18 DIAGNOSIS — R51.9: Primary | ICD-10-CM

## 2022-03-18 DIAGNOSIS — K21.9: ICD-10-CM

## 2022-03-18 PROCEDURE — 99284 EMERGENCY DEPT VISIT MOD MDM: CPT

## 2022-03-18 PROCEDURE — 75809 NONVASCULAR SHUNT X-RAY: CPT

## 2022-03-18 PROCEDURE — 70450 CT HEAD/BRAIN W/O DYE: CPT

## 2022-03-18 PROCEDURE — 49427 INJECTION ABDOMINAL SHUNT: CPT

## 2022-03-18 NOTE — EDPHYS
Physician Documentation                                                                           

 [facility]                                                                 

Name: Redd Dale Jr                                                                            

Age: 36 yrs                                                                                       

Sex: Male                                                                                         

: 1985                                                                                   

MRN: [id number]                                                                                   

Arrival Date: 2022                                                                          

Time: 18:33                                                                                       

Account#: [id number]                                                                            

Bed 13                                                                                            

Private MD:                                                                                       

ED Physician Juan Luis Lerner                                                                       

HPI:                                                                                              

                                                                                             

20:18 This 36 yrs old Black Male presents to ER via EMS with complaints of headache.          kdr 

20:18 The patient complains of pain to the forehead. The patient describes the headache as    kdr 

      aching, constant, a pressure. Onset: The symptoms/episode began/occurred 3 week(s) ago.     

      Associated signs and symptoms: Pertinent positives: nausea, Pertinent negatives:            

      altered mental status, fever, malaise, nausea, neck stiffness, paresthesias,                

      Photophobia rash, sinus congestion, sinus tenderness, vision changes, vision loss,          

      vomiting. Severity of symptoms: At its worst the pain was mild, moderate, just prior to     

      arrival, in the emergency department the pain is unchanged. Headache History: The           

      patient has had previous headaches and this one is similar to previous episodes. The        

      symptoms are alleviated by nothing. the symptoms are aggravated by nothing. The patient     

      has experienced similar episodes in the past, multiple times, chronically. The patient      

      has been seen at [facility] and states he had a negative head CT yesterday.                       

20:23 Patient was last seen here on  for a similar problem.                          kdr 

                                                                                                  

Historical:                                                                                       

- Allergies:                                                                                      

18:44 Amoxicillin;                                                                            ss7 

18:44 Bactrim;                                                                                ss7 

18:44 Ciprofloxacin;                                                                          ss7 

18:44 CLAVULANIC ACID;                                                                        ss7 

18:44 Demerol;                                                                                ss7 

18:44 Doxycycline;                                                                            ss7 

18:44 Levofloxacin;                                                                           ss7 

18:44 Morphine;                                                                               ss7 

18:44 PENICILLINS;                                                                            ss7 

18:44 Toradol;                                                                                ss7 

18:44 TRIMETHOPRIM;                                                                           ss7 

18:44 Vancomycin;                                                                             ss7 

18:44 Zofran;                                                                                 ss7 

- Home Meds:                                                                                      

18:44 Celexa 20 mg Oral tab 1 tab once daily [Active]; fentanyl 25 mcg/hr Topical pt72 1      ss7 

      patch every 72 hours [Active]; Pepcid 20 mg Oral tab 1 tab once daily [Active];             

      Percocet  mg Oral tab 1 tab every 6 hours [Active]; Reglan 10 mg Oral tab 1 tab       

      once daily [Active]; Wellbutrin  mg Oral TbER 1 tab 2 times per day [Active];         

- PMHx:                                                                                           

18:44 Asthma; Cerebral Palsy; cluster headaches; decubitus ulcers on feet; GERD;              ss7 

      Hydrocephalus; Hypertension; spina bifida;                                                  

                                                                                                  

- Immunization history:: Adult Immunizations up to date.                                          

- Social history:: Smoking status: Patient denies any tobacco usage or history of.                

                                                                                                  

                                                                                                  

ROS:                                                                                              

20:18 Constitutional: Negative for fever, chills, and weight loss, Eyes: Negative for injury, kdr 

      pain, redness, and discharge, ENT: Negative for injury, pain, and discharge, Neck:          

      Negative for injury, pain, and swelling, Cardiovascular: Negative for chest pain,           

      palpitations, and edema, Respiratory: Negative for shortness of breath, cough,              

      wheezing, and pleuritic chest pain, Abdomen/GI: Negative for abdominal pain, nausea,        

      vomiting, diarrhea, and constipation, Back: Negative for injury and pain, : Negative      

      for injury, bleeding, discharge, and swelling, MS/Extremity: Negative for injury and        

      deformity, Skin: Negative for injury, rash, and discoloration, Psych: Negative for          

      depression, anxiety, suicide ideation, homicidal ideation, and hallucinations,              

      Allergy/Immunology: Negative for hives, rash, and allergies, Endocrine: Negative for        

      neck swelling, polydipsia, polyuria, polyphagia, and marked weight changes,                 

      Hematologic/Lymphatic: Negative for swollen nodes, abnormal bleeding, and unusual           

      bruising.                                                                                   

20:18 Neuro: Positive for headache, Negative for altered mental status, dizziness, gait           

      disturbance, numbness, seizure activity, speech changes, syncope, near syncope,             

      tingling, tinnitus, tremor, visual changes, weakness, acute changes.                        

                                                                                                  

Exam:                                                                                             

20:18 Constitutional:  This is a well developed, well nourished patient who is awake, alert,  kdr 

      and in no acute distress. Head/Face:  Normocephalic, atraumatic. Eyes:  Pupils equal        

      round and reactive to light, extra-ocular motions intact.  Lids and lashes normal.          

      Conjunctiva and sclera are non-icteric and not injected.  Cornea within normal limits.      

      Periorbital areas with no swelling, redness, or edema. Neck:  Trachea midline, no           

      thyromegaly or masses palpated, and no cervical lymphadenopathy.  Supple, full range of     

      motion without nuchal rigidity, or vertebral point tenderness.  No Meningismus.             

      Chest/axilla:  Normal chest wall appearance and motion.  Nontender with no deformity.       

      No lesions are appreciated. Cardiovascular:  Regular rate and rhythm with a normal S1       

      and S2.  No gallops, murmurs, or rubs.  Normal PMI, no JVD.  No pulse deficits.             

      Respiratory:  Lungs have equal breath sounds bilaterally, clear to auscultation and         

      percussion.  No rales, rhonchi or wheezes noted.  No increased work of breathing, no        

      retractions or nasal flaring. Abdomen/GI:  Soft, non-tender, with normal bowel sounds.      

      No distension or tympany.  No guarding or rebound.  No evidence of tenderness               

      throughout. Back:  No spinal tenderness.  No costovertebral tenderness.  Patient has a      

      history of spina bifida and from his low back distally down his extremities, he has         

      well-known and unchanged deformities of his pelvis and lower extremities.  Patient has      

      no complaints involving this aspect of his right Skin:  Warm, dry with normal turgor.       

      Normal color with no rashes, no lesions, and no evidence of cellulitis. Neuro:  Awake       

      and alert, GCS 15, oriented to person, place, time, and situation.  Cranial nerves          

      II-XII grossly intact.  Motor strength 5/5 in all extremities.  Sensory grossly intact.     

       Cerebellar exam normal.  Normal gait. Psych:  Awake, alert, with orientation to            

      person, place and time.  Behavior, mood, and affect are within normal limits.               

                                                                                                  

Vital Signs:                                                                                      

18:40  / 91; Pulse 107; Resp 20; Temp 98.3; Pulse Ox 95% ; Weight 127.46 kg; Height 4   ss7 

      ft. 11 in. (149.86 cm);                                                                     

19:00  / 81 LA Supine (auto/reg); Pulse 97 MON; Resp 18 S; Temp 98.1(O); Pulse Ox 92%   tk1 

      on R/A; Pain 7/10;                                                                          

20:00  / 74 LA Supine (auto/reg); Pulse 96 MON; Resp 16 S; Pulse Ox 95% on R/A; Pain    tk1 

      10/10;                                                                                      

21:00  / 60 LA Supine (auto/reg); Pulse 91 MON; Resp 20; Pulse Ox 95% on R/A; Pain 4/10;tk1 

22:00  / 61 LA Supine (auto/reg); Pulse 92 MON; Resp 18 S; Pulse Ox 92% on R/A; Pain    tk1 

      3/10;                                                                                       

22:20 Pain 2/10;                                                                              tk1 

18:40 Body Mass Index 56.75 (127.46 kg, 149.86 cm)                                            ss7 

                                                                                                  

MDM:                                                                                              

20:18 Data reviewed: vital signs, nurses notes, lab test result(s), radiologic studies.       kdr 

      Counseling: I had a detailed discussion with the patient and/or guardian regarding: the     

      historical points, exam findings, and any diagnostic results supporting the                 

      discharge/admit diagnosis, lab results, radiology results, the need for outpatient          

      follow up.                                                                                  

22:32 Patient medically screened.                                                             kdr 

                                                                                                  

                                                                                             

19:48 Order name: CT Head Brain wo Cont; Complete Time: 21:46                                 kdr 

                                                                                             

19:48 Order name: Shuntogram XRAY; Complete Time: 21:46                                       kdr 

                                                                                                  

Administered Medications:                                                                         

21:35 Drug: Phenergan (promethazine) 25 mg Route: IVP; Rate: 6.25 mg/min; Infused Over: 4     tk1 

      mins; Site: right femoral;                                                                  

22:20 Follow up: Response: Pain is decreased                                                  tk1 

21:40 Drug: Dilaudid (HYDROmorphone) 1 mg Route: IVP; Rate: 0.5 mg/min; Infused Over: 2 mins; tk1 

      Site: right forearm;                                                                        

22:20 Follow up: Pain 2/10; Response: Pain is decreased                                       tk1 

21:50 Drug: Pepcid (famotidine) 20 mg Route: IVP; Rate: 10 mg/min; Infused Over: 2 mins;      tk1 

      Site: right forearm;                                                                        

22:30 Follow up: Response: Pain is decreased                                                  tk1 

                                                                                                  

                                                                                                  

Disposition Summary:                                                                              

22 22:32                                                                                    

Discharge Ordered                                                                                 

      Location: Home                                                                          kdr 

      Problem: an acute exacerbation                                                          kdr 

      Symptoms: have improved                                                                 kdr 

      Condition: Stable                                                                       kdr 

      Diagnosis                                                                                   

        - Headache                                                                            kdr 

        -  Shunt, cerebral palsy, recurrent headaches                                       kdr 

      Followup:                                                                               kdr 

        - With: Private Physician                                                                  

        - When: 2 - 3 days                                                                         

        - Reason: If symptoms return, Further diagnostic work-up, Recheck today's complaints,      

      Continuance of care, Re-evaluation by your physician                                        

      Discharge Instructions:                                                                     

        - Discharge Summary Sheet                                                             kdr 

        - General Headache Without Cause                                                      kdr 

      Forms:                                                                                      

        - Medication Reconciliation Form                                                      kdr 

        - Thank You Letter                                                                    kdr 

        - SBAR form                                                                           mw2 

Signatures:                                                                                       

Dispatcher [employer]                           Juan Luis Barker MD MD   kdr                                                  

Kendal Jean Baptiste                                tk1                                                  

Ella Diaz, RN                        RN   ss7                                                  

                                                                                                  

**************************************************************************************************

## 2022-03-18 NOTE — RAD REPORT
EXAM DESCRIPTION:  RAD - Shuntogram - 3/18/2022 9:00 pm

 

CLINICAL HISTORY:  headache

Headache, drowsiness

 

COMPARISON:  Abdomen   Pelvis W Contrast dated 3/14/2022

 

FINDINGS:  Right-sided shunt tubing is noted. This traverses the chest and appears to enter the abdom
en along its left aspect. No kink or break is seen in this shunt. Several additional old right-sided 
shunt tubes are noted.

## 2022-03-18 NOTE — XMS REPORT
Continuity of Care Document

                            Created on:2022



Patient:JORDAN VERDIN JR

Sex:Male

:1985

External Reference #:[id number]





Demographics







                          Address                   [address]



                                                    [place] [address]

 

                          Home Phone                [phone]

 

                          Work Phone                [phone]

 

                          Mobile Phone              [phone]

 

                          Email Address             DECLINED

 

                          Preferred Language        English

 

                          Marital Status            Unknown

 

                          Moravian Affiliation     Unknown

 

                          Race                      Unknown

 

                          Additional Race(s)        Black or 

 

                          Ethnic Group              Unknown









Author







                          Organization              [facility]

t

 

                          Address                   [facility]



                                                    [place] [address]

 

                          Phone                     [phone]









Support







                Name            Relationship    Address         Phone

 

                Chito          Mother          [address] [phone]



                                                [place] [address] 

 

                one else per patient Unavailable     Unavailable     Unavailable

 

                Chito          Unavailable     [address] [phone]



                                                [place] [address] 

 

                Chito          Unavailable     Unavailable     [phone]

 

                Chito Pressley       Unavailable     [phone]



                                                [place] [address] 

 

                PATIENT,  ONE ELSE PER Unavailable     Unavailable     Unavailab

le

 

                Chito          Mother          [address] [phone]

[phone]



                                                [place] [address] 









Care Team Providers







                    Name                Role                Phone

 

                    ILEANA Mc           Primary Care Physician [phone]

 

                    Jesusita               Attending Clinician Unavailable

 

                    ELENA MCKEON      Attending Clinician Unavailable

 

                    Elena Carver  Attending Clinician [phone]

 

                    Thomas BOYD           Attending Clinician [phone]

 

                    DARWIN GARAY          Attending Clinician Unavailable

 

                    DARWIN Garay MD       Attending Clinician [phone]

 

                    Leo MEADOWS          Attending Clinician [phone]

 

                    MAXIM VILLASEÑOR          Attending Clinician Unavailable

 

                    MAXIM Woodard      Attending Clinician [phone]

 

                    VIV MARIA          Attending Clinician Unavailable

 

                    Candace LAWRENCE  G       Attending Clinician [phone]

 

                    Only,  Db Test      Attending Clinician Unavailable

 

                    Mario Alberto MEADOWS             Attending Clinician [phone]

 

                    MARIO ALBERTO                Attending Clinician Unavailable

 

                    SINGER              Attending Clinician Unavailable

 

                    Singer            Attending Clinician [phone]

 

                    Daisy JACOBSON,  F    Attending Clinician [phone]

 

                    TWAN NAJERA        Attending Clinician Unavailable

 

                    SILVESTRE             Attending Clinician Unavailable

 

                    RALPH TORRES         Attending Clinician Unavailable

 

                    LEO             Admitting Clinician Unavailable

 

                    Leo MEADOWS          Admitting Clinician [phone]

 

                    ROSEACE,  MAXIM          Admitting Clinician Unavailable

 

                    VIV MARIA          Admitting Clinician Unavailable

 

                    RANDALL              Admitting Clinician Unavailable

 

                    RALPH TORRES         Admitting Clinician Unavailable









Payers







           Payer Name Policy Type Policy Number Effective Date Expiration Date DARWIN barnes

 

           [facility]            [id number]  2021            



                                            00:00:00              







Advance Directives







           Directive  Decision   Effective  Termination Comments   Source



                                 Date       Date                  

 

           Healthcare Agents on N/A                                         Univ

ersity



           FileNameRelationshipHealcare                                       

      of [place]



           Agent                                                  Medical



           RelationshipCommunicationAmerican Healthcare Systems Care                                             



           [phone]                                             



           (Mobile)[phone] (Work)                                          

   







Problems







       Condition Condition Condition Status Onset  Resolution Last   Treating Co

mments 

Source



       Name   Details Category        Date   Date   Treatment Clinician        



                                                 Date                 

 

       Complicate Complicate Disease Active                              U

nivers



       d urinary d urinary               1-20                               ity 

of



       tract  tract                00:00:                             [place]



       infection infection               00                                 [facility]

 

       Hyperglyce Hyperglyce Disease Active                              C

[facility]



       jarvis    jarvis                  1-07                               [facility]



       without without               00:00:                             Medical



       ketosis ketosis               00                                 Center

 

       HEADACHE        Diagnosis Active 2018               M

emoria



                                             09:20:00               l



                HEADACHE               00:00:                             [facility]

n



                                   00                                 



                                                                      



               Active                                                  



                                                                      



                                                                      



                                                                      



                                                                      



                                                                      



                                                                      



                                                                      



                                                                      



                                                                      



                                                                      



                    [facility]                                                  



                                                                      



                                                                      

 

       SHUNT         Diagnosis Active 2018               Mem

oria



       MALFUNCTIO                                20:00:00               l



       N        SHUNT               00:00:                             Jose



              MALFUNCTIO               00                                 



              N                                                       



                                                                      



                 Active                                                  



                                                                      



                                                                      



              2018                                                  



                                                                      



                                                                      



                                                                      



                                                                      



                                                                      



                                                                      



                                                                      



                                                                      



                     [facility]                                                  



                                                                      



                                                                      

 

       ACUTE         Diagnosis Active 2018               Mem

oria



       HEADACHE                      -          09:20:00               l



                ACUTE               00:00:                             Jose



              HEADACHE               00                                 



                                                                      



                                                                      



              Active                                                  



                                                                      



                                                                      



              2018                                                  



                                                                      



                                                                      



                                                                      



                                                                      



                                                                      



                                                                      



                                                                      



                                                                      



                     [facility]                                                  



                                                                      



                                                                      

 

       Spina  Spina  Disease Active                              [facility]



       bifida bifida                                              [facility]



                                   00:00:                             Medical



                                   00                                 [facility]

 

       Pyelonephr Pyelonephr Disease Active                              C

[facility]



       itis   itis                                                [facility]



                                   00:00:                             Medical



                                   00                                 [facility]

 

       Morbid Morbid Disease Active                              [facility]



       obesity obesity               1-04                               ity of



       with body with body               00:00:                             [place]

[place]



       mass index mass index               00                                 Me

dical



       of 50 or of 50 or                                                  Branch



       higher higher                                                  

 

       Morbid Morbid Disease Active 2017-0                             [facility]



       obesity obesity               1-04                               ity of



       with body with body               00:00:                             [place]

s



       mass index mass index               00                                 Me

dical



       of     of                                                      Branch



       40.0-49.9 40.0-49.9                                                  

 

       Spina         Problem                      2019               Memor

ia



       bifida,                                    14:14:02               l



       unspecifie   Spina                                                  [place]

nn



       d      bifida,                                                  



              unspecifie                                                  



              d                                                       



                                                                      



                                                                      



                                                                      



                                                                      



                                                                      



                                                                      



                                                                      



                                                                      



              2019                                                  



                                                                      



                                                                      



                                                                      



                                                                      



                 [facility]                                                  



                                                                      



                                                                      

 

       Nausea        Problem                      2019               Memor

ia



       with                                      14:14:02               l



       vomiting,   Nausea                                                  [place]

nn



       unspecifie with                                                    



       d      vomiting,                                                  



              unspecifie                                                  



              d                                                       



                                                                      



                                                                      



                                                                      



                                                                      



                                                                      



                                                                      



                                                                      



                                                                      



              2019                                                  



                                                                      



                                                                      



                                                                      



                                                                      



                 [facility]                                                  



                                                                      



                                                                      

 

       Diplopia        Problem                      2019               Mem

oria



                                                 14:14:02               l



                Diplopia                                                  Miguel

n



                                                                      



                                                                      



                                                                      



                                                                      



                                                                      



                                                                      



                                                                      



                                                                      



                                                                      



              2019                                                  



                                                                      



                                                                      



                                                                      



                                                                      



                 [facility]                                                  



                                                                      



                                                                      

 

       Cerebral        Problem                      2019               Mem

oria



       palsy,                                    14:14:02               l



       unspecifie   Cerebral                                                  He

rmann



       d      palsy,                                                  



              unspecifie                                                  



              d                                                       



                                                                      



                                                                      



                                                                      



                                                                      



                                                                      



                                                                      



                                                                      



                                                                      



              2019                                                  



                                                                      



                                                                      



                                                                      



                                                                      



                 [facility]                                                  



                                                                      



                                                                      

 

       Acquired        Problem                      2019               Mem

oria



       absence of                                    14:14:02               l



       other    Acquired                                                  Miguel

n



       specified absence of                                                  



       parts of other                                                   



       digestive specified                                                  



       tract  parts of                                                  



              digestive                                                  



              tract                                                   



                                                                      



                                                                      



                                                                      



                                                                      



                                                                      



                                                                      



                                                                      



                                                                      



                                                                      



              2019                                                  



                                                                      



                                                                      



                                                                      



                                                                      



                 [facility]                                                  



                                                                      



                                                                      

 

       Nicotine        Problem                      2019               Mem

oria



       dependence                                    14:14:02               l



       ,        Nicotine                                                  Miguel

n



       cigarettes dependence                                                  



       ,      ,                                                       



       uncomplica cigarettes                                                  



       huma    ,                                                       



              uncomplica                                                  



              huma                                                     



                                                                      



                                                                      



                                                                      



                                                                      



                                                                      



                                                                      



                                                                      



                                                                      



              2019                                                  



                                                                      



                                                                      



                                                                      



                                                                      



                 [facility]                                                  



                                                                      



                                                                      

 

       Presence        Problem                      2019               Mem

oria



       of                                        14:14:02               l



       cerebrospi   Presence                                                  He

rmann



       nal fluid of                                                      



       drainage cerebrospi                                                  



       device nal fluid                                                  



              drainage                                                  



              device                                                  



                                                                      



                                                                      



                                                                      



                                                                      



                                                                      



                                                                      



                                                                      



                                                                      



                                                                      



              2019                                                  



                                                                      



                                                                      



                                                                      



                                                                      



                 [facility]                                                  



                                                                      



                                                                      

 

       Allergy        Problem                      2019               [facility]

rajeev



       status to                                    14:14:02               l



       other    Allergy                                                  Jose



       antibiotic status to                                                  



       agents other                                                   



       status antibiotic                                                  



              agents                                                  



              status                                                  



                                                                      



                                                                      



                                                                      



                                                                      



                                                                      



                                                                      



                                                                      



                                                                      



                                                                      



              2019                                                  



                                                                      



                                                                      



                                                                      



                                                                      



                 [facility]                                                  



                                                                      



                                                                      

 

       Allergy        Problem                      2019               [facility]

rajeev



       status to                                    14:14:02               l



       other    Allergy                                                  [place]



       drugs, status to                                                  



       medicament other                                                   



       s and  drugs,                                                  



       biological medicament                                                  



       substances s and                                                   



       status biological                                                  



              substances                                                  



              status                                                  



                                                                      



                                                                      



                                                                      



                                                                      



                                                                      



                                                                      



                                                                      



                                                                      



                                                                      



              2019                                                  



                                                                      



                                                                      



                                                                      



                                                                      



                 [facility]                                                  



                                                                      



                                                                      

 

       Allergy        Problem                      2019               [facility]

rajeev



       status to                                    14:14:02               l



       narcotic   Allergy                                                  [place]

nn



       agent  status to                                                  



       status narcotic                                                  



              agent                                                   



              status                                                  



                                                                      



                                                                      



                                                                      



                                                                      



                                                                      



                                                                      



                                                                      



                                                                      



                                                                      



              2019                                                  



                                                                      



                                                                      



                                                                      



                                                                      



                 [facility]                                                  



                                                                      



                                                                      

 

       Asthma        Problem Resolve               2019               [facility]

rajeev



       (disorder)               d                    01:05:55               l



                Asthma                                                  [place]



              (disorder)                                                  



                                                                      



                                                                      



               Resolved                                                  



                                                                      



                                                                      



                                                                      



                                                                      



                Problem                                                  



                                                                      



                                                                      



              2019                                                  



                                                                      



                                                                      



                                                                      



                                                                      



                 [facility]                                                  



                                                                      



                                                                      

 

       Bronchitis        Problem Resolve               2019               

[facility]



       (disorder)               d                    01:05:55               l



                                                                      [place]



              Bronchitis                                                  



              (disorder)                                                  



                                                                      



                                                                      



               Resolved                                                  



                                                                      



                                                                      



                                                                      



                                                                      



                Problem                                                  



                                                                      



                                                                      



              2019                                                  



                                                                      



                                                                      



                                                                      



                                                                      



                 [facility]                                                  



                                                                      



                                                                      

 

       Cerebral        Problem Resolve               2019               Me

moria



       palsy                d                    01:05:55               l



       (disorder)   Cerebral                                                  He

rmann



              palsy                                                   



              (disorder)                                                  



                                                                      



                                                                      



               Resolved                                                  



                                                                      



                                                                      



                                                                      



                                                                      



                Problem                                                  



                                                                      



                                                                      



              2019                                                  



                                                                      



                                                                      



                                                                      



                                                                      



                 [facility]                                                  



                                                                      



                                                                      

 

       Hydrocepha        Problem Resolve               2019               

[facility]



       ozzy                  d                    01:05:55               l



       (disorder)                                                         Miguel

n



              Hydrocepha                                                  



              ozzy                                                     



              (disorder)                                                  



                                                                      



                                                                      



               Resolved                                                  



                                                                      



                                                                      



                                                                      



                                                                      



                Problem                                                  



                                                                      



                                                                      



              2019                                                  



                                                                      



                                                                      



                                                                      



                                                                      



                 [facility]                                                  



                                                                      



                                                                      

 

       Osteomyeli        Problem Resolve               2019               

[facility]



       tis                  d                    01:05:55               l



       (disorder)                                                         Miguel

n



              Osteomyeli                                                  



              tis                                                     



              (disorder)                                                  



                                                                      



                                                                      



               Resolved                                                  



                                                                      



                                                                      



                                                                      



                                                                      



                Problem                                                  



                                                                      



                                                                      



              2019                                                  



                                                                      



                                                                      



                                                                      



                                                                      



                 [facility]                                                  



                                                                      



                                                                      

 

       Acute pain        Problem Active               2019               M

emoria



       (finding)                                    01:05:55               l



                Acute                                                  Jose



              pain                                                    



              (finding)                                                  



                                                                      



                                                                      



              Active                                                  



                                                                      



                                                                      



                                                                      



                                                                      



              Problem                                                  



                                                                      



                                                                      



              2019                                                  



                                                                      



                                                                      



                                                                      



                                                                      



                 [facility]                                                  



                                                                      



                                                                      

 

       Headache        Problem Active               2019               Mem

oria



       (finding)                                    01:05:55               l



                Headache                                                  Miguel

n



              (finding)                                                  



                                                                      



                                                                      



              Active                                                  



                                                                      



                                                                      



                                                                      



                                                                      



              Problem                                                  



                                                                      



                                                                      



              2019                                                  



                                                                      



                                                                      



                                                                      



                                                                      



                 [facility]                                                  



                                                                      



                                                                      







History of Past Illness







       Condition Condition Condition Status Onset  Resolution Last   Treating Co

mments 

Source



       Name   Details Category        Date   Date   Treatment Clinician        



                                                 Date                 

 

       Headache        Problem        2019              

 Memoria



                                   -08   14:14:02 14:14:02               l



                Headache               06:00:                             Miguel

n



                                   00                                 



                                                                      



                                                                      



                                                                      



                                                                      



              2018                                                  



                                                                      



                                                                      



                                                                      



                                                                      



                 [facility]                                                  



                                                                      



                                                                      







Allergies, Adverse Reactions, Alerts







       Allergy Allergy Status Severity Reaction(s) Onset  Inactive Treating Comm

ents 

Source



       Name   Type                        Date   Date   Clinician        

 

       Amoxicil Propensi Active        Hives                        [facility]

s



       bryn    ty to                                               ity of



              adverse                      00:00:                      [place]



              reaction                      00                          Medical



              s                                                       Branch

 

       AMOXICIL DRUG   Active        Hives                        [facility]



       BRYN    INGREDI                                              ity of



                                          00:00:                      [place]



                                          00                          Medical



                                                                      Branch

 

       Metoclop Propensi Active        Nausea 2017                      [facility]

s



       ramide ty to                and/or 2-20                        ity of



       Hcl    adverse               Vomiting 00:00:                      [place]



              reaction                      00                          Medical



              s                                                       Branch

 

       METOCLOP DRUG   Active        N/V    -                      [facility]



       RAMIDE INGREDI                      2-20                        ity of



       HCL                                00:00:                      [place]



                                          00                          Medical



                                                                      Branch

 

       Sulfamet Propensi Active        Hives  2017-0                      [facility]



       hoxazole ty to                       6-11                        [facility]



       [facility] adverse                      00:00:                      [facility]



       oprim  reaction                      00                          Center



              s                                                       

 

       Levoflox Propensi Active        Hives  2017-                      [facility]



       acin   ty to                       6-11                        [facility]



              adverse                      00:00:                      [facility]



              reaction                      00                          Center



              s                                                       

 

       SULFAMET Allergy Active High   Hives  2017-                      [facility]



       HOXAZOLE                             6-11                        [facility]



       [facility]                             00:00:                      [facility]



       OPRIM                              00                          Center

 

       LEVOFLOX Allergy Active High   Hives  2017-                      [facility]



       ACIN                               6-11                        [facility]



                                          00:00:                      [facility]



                                          00                          Center

 

       MORPHINE Allergy Active High   Hives  2017-0                      [facility]



                                          6-11                        [facility]



                                          00[facility]                      [facility]



                                          00                          Center

 

       KETOROLA Allergy Active High   Rash   2017-                      [facility]



       C                                  6-11                        [facility]



                                          00:00:                      Medical



                                          00                          Center

 

       VANCOMYC Allergy Active High   Rash   2017-                      [facility]



       IN                                 6-11                        [facility]



       ANALOGUE                             00:00:                      Medical



       S                                  00                          Center

 

       SESAME Allergy Active        Hives  2017-0                      [facility]



       SEED                               6-11                        [facility]



                                          00:00:                      [facility]



                                          00                          Center

 

       Morphine Propensi Active        Hives  -                      [facility]



              ty to                       6-11                        [facility]



              adverse                      00:00:                      Medical



              reaction                      00                          Center



              s                                                       

 

       ONDANSET Allergy Active        N\T\V  -                      [facility]



       EVIN HCL                             6-11                        [facility]



       (PF)                               00:00:                      Medical



                                          00                          Center

 

       Sesame Propensi Active        Hives  2017-                      [facility]



       Seed   ty to                       6-11                        [facility]



              adverse                      00:00:                      Medical



              reaction                      00                          Center



              s                                                       

 

       Ketorola Propensi Active        Rash   2017-                      [facility]



       c      ty to                       6-11                        [facility]



              adverse                      00:00:                      Medical



              reaction                      00                          Center



              s                                                       

 

       Vancomyc Propensi Active        Rash   2017-                      [facility]



       in     ty to                       6-11                        [facility]



       Analogue adverse                      00:00:                      Medical



       s      reaction                      00                          Center



              s                                                       

 

       Ondanset Propensi Active        Nausea And 2017-0                      [facility]

[facility]



       evin Hcl ty to                Vomiting 6-11                        [facility]



       (Pf)   adverse                      00:00:                      Medical



              reaction                      00                          Center



              s                                                       

 

       Sulfa  Propensi Active        Other - See                       Uni

vers



       (Sulfona ty to                comments 1-04                        ity of



       mide   adverse                      00:00:                      [place]



       Antibiot reaction                      00                          Medica

l



       ics)   s                                                       Branch

 

       Sulfamet Propensi Active        Other - See                       U

nivers



       hoprim ty to                comments 1-                        ity of



       Ds     adverse                      00:00:                      [place]



              reaction                      00                          Medical



              s                                                       Branch

 

       Vancomyc Propensi Active        Other - See                       U

nivers



       in     ty to                comments 1-04                        ity of



              adverse                      00:00:                      [place]



              reaction                      00                          Medical



              s                                                       Branch

 

       SULFA  Drug   Active        Other-Cmnt                       Univer

s



       (SULFONA Class                       1-04                        ity of



       MIDE                               00:00:                      [place]



       ANTIBIOT                             00                          Medical



       ICS)                                                           Branch

 

       SULFAMET DRUG   Active        Other-Cmnt                       Univ

ers



       HOPRIM                             1                        ity of



       DS                                 00:00:                      [place]



                                          00                          Medical



                                                                      Branch

 

       VANCOMYC DRUG   Active        Other-Cmnt                       Univ

ers



       IN     INGREDI                      1                        ity of



                                          00:00:                      [place]



                                          00                          Medical



                                                                      Branch

 

       Sulfamet Propensi Active        Rash   -0                      Univer

s



       hoxazole ty to                       7-19                        ity of



              adverse                      00:00:                      [place]



              reaction                      00                          Medical



              s                                                       Branch

 

       Ondanset Propensi Active        Nausea -0                      Univer

s



       evin Hcl ty to                and/or 7-19                        ity of



       (Pf)   adverse               Vomiting 00:00:                      [place]



              reaction                      00                          Medical



              s                                                       Branch

 

       SULFAMET DRUG   Active        Rash   -0                      [facility]



       HOXAZOLE INGREDI                      7-19                        ity of



                                          00:00:                      [place]



                                          00                          Medical



                                                                      Branch

 

       ONDANSET DRUG   Active        N/V    -0                      Univers



       EVIN HCL                             7-19                        ity of



       (PF)                               00:00:                      [place]



                                          00                          Medical



                                                                      Branch

 

       Levoflox Propensi Active        Hives  -0                      Univer

s



       acin   ty to                       5-23                        ity of



              adverse                      00:00:                      [place]



              reaction                      00                          Medical



              s                                                       Branch

 

       Morphine Propensi Active        Hives  -0                      Univer

s



              ty to                       5-23                        ity of



              adverse                      00:00:                      [place]



              reaction                      00                          Medical



              s                                                       Branch

 

       Ketorola Propensi Active        Nausea -0                      Univer

s



       c      ty to                and/or 5-23                        ity of



       Trometha adverse               Vomiting 00:00:                      [place]



       mine   reaction                      00                          Medical



              s                                                       Branch

 

       LEVOFLOX DRUG   Active        Hives  0                      Univers



       ACIN   INGREDI                      5-23                        ity of



                                          00:00:                      [place]



                                          00                          Medical



                                                                      Branch

 

       MORPHINE DRUG   Active        Hives  -0                      Univers



              INGREDI                      5-23                        ity of



                                          00:00:                      [place]



                                          00                          [facility]

 

       KETOROLA DRUG   Active        N/V                          Univers



       C      INGREDI                                              ity of



       TROMETHA                             00:00:                      [facility]                                                         [facility]

 

       Morphine Adverse Active        Info Not                             [facility]

t



       Sulfate Reaction               Available                             [facility]

s -



       [facility]                                                             [facility]



                                                                      [facility]



                                                                      [facility]

 

       Levaquin Adverse Active        Info Not                             [facility]

t



              Reaction               Available                             [facility]



                                                                      [facility]



                                                                      [facility]

 

       Zofran Adverse Active        Info Not                             [facility]



              Reaction               Available                             [facility]

 -



                                                                      [facility]



                                                                      l



                                                                      Outpati



                                                                      ent



                                                                      Clinics

 

       Minocin Minocin Active                                           [facility]



                                                                      l



                                                                      [place]

 

       Zofran Zofran Active                                           [facility]



                                                                      l



                                                                      [place]

 

       Levaquin Levaquin Active                                           [facility]

a



                                                                      l



                                                                      [place]

 

       Bactrim Bactrim Active                                           [facility]



                                                                      l



                                                                      [place]

 

       amoxicil amoxicil Active                                           [facility]

a



       bryn    bryn                                                     l



                                                                      [place]

 

       morphine morphine Active                                           [facility]

a



                                                                      l



                                                                      [place]

 

       Toradol Toradol Active                                           [facility]



                                                                      l



                                                                      [place]







Social History







           Social Habit Start Date Stop Date  Quantity   Comments   Source

 

           History of tobacco                       Cigarette Smoker            

[facility] -



           use                                                    [facility]

 

           Exposure to                       Not sure              [facility]



           SARS-CoV-2 (event)                                             [facility]

 

           Alcohol intake 2022 0 /d                  [facility]

 of



                      00:00:00   00:00:00                         [facility]

 

           Cigarettes smoked 2017                       [facility] -



           current (pack per 00:00:00   00:00:00                         Medical

 Center



           day) - Reported                                             

 

           Tobacco use and 2017 Never used            [facility]

y of



           exposure   00:00:00   00:00:00                         [facility]

 

           Sex Assigned At 1985                       [facility]

y of



           Birth      00:00:00   00:00:00                         [facility]









                Smoking Status  Start Date      Stop Date       Source

 

                Current every day smoker 2017 00:00:00                 Uni

versity of [facility]







Medications







       Ordered Filled Start  Stop   Current Ordering Indication Dosage Frequency

 Signature

                    Comments            Components          Source



     Medication Medication Date Date Medication? Clinician                (SIG) 

          



     Name Name                                                   

 

     butalbital-      2022- No             2{tbl}      2 tablet,         

  [facility]



     acetaminoph      3-18 03-18                          Oral,           ity of



     en-caff      03:45: 03:10                          ONCE, 1           [place]



     (ESGIC)      00   :00                           dose, On           Medical



     -40                                         Thu            Branch



     mg tablet 2                                         3/17/22 at           



     tablet                                         2245, ASAP           

 

     NaCl 0.9%      2022- No             500mL      at 999           Univ

ers



     (NS) bolus      3-18 03-18                          mL/hr, 500           it

y of



     infusion      02:30: 03:17                          mL, IV           [place]



     500 mL      00   :00                           Infusion,           Medical



                                                  ONCE, 1           Branch



                                                  dose, On           



                                                  u            



                                                  3/17/22 at           



                                                  2130, STAT           

 

     diphenhydrA      2022- No             25mg      25 mg,           Uni

vers



     MINE      3-18 03-18                          Slow IV           ity of



     (BENADRYL)      02:30: 01:46                          Push,           [place]



     injection      00   :00                           ONCE, 1           Medical



     25 mg                                         dose, On           Branch



                                                  u            



                                                  3/17/22 at           



                                                  2130, STAT           

 

     magnesium      2022- No             2g        2 g, IV           Univ

ers



     sulfate in      3-18 03-18                          Piggyback,           it

y of



     water 2      02:15: 03:17                          Administer           Eric

as



     gram/50 mL      00   :00                           over 30           Medica

l



     (4 %)                                         Minutes,           Branch



     infusion 2                                         ONCE, 1           



     g                                            dose, On           



                                                  u            



                                                  3/17/22 at           



                                                  2115,           



                                                  Routine           

 

     HYDROmorpho      2022- No             .5mg      0.5 mg,           Un

yoselyn



     ne                                  Slow IV           ity of



     (DILAUDID)      01:30: 01:25                          Push,           [place]



     injection      00   :00                           ONCE, 1           Medical



     0.5 mg                                         dose, On           Branch



                                                  Tue            



                                                  22 at           



                                                  1930,           



                                                  Routine<br           



                                                  >Use           



                                                  approved           



                                                  by             



                                                  (Faculty):           



                                                  ADC            



                                                  PROVIDER           

 

     levoFLOXaci      2022- Yes       [id number] 750mg      Take 1        

   [facility]



     n 750 mg                                tablet by           ity o

f



     tablet      00:00: 05:59                          mouth           [place]



               00   :00                           daily for           Medical



                                                  4 days.           Branch

 

     levoFLOXaci      2022- Yes       [id number] 750mg      Take 1        

   [facility]



     n 750 mg                                tablet by           ity o

f



     tablet      00:00: 05:59                          mouth           [place]



               00   :00                           daily for           Medical



                                                  4 days.           Branch

 

     OXYCODONE            Yes                      Take  by           [facility]

ers



     HCL/ACETAMI      1-25                               mouth.           ity of



     NOPHEN      19:49:                                              [place]



     (PERCOCET      27                                                Medical



     ORAL)                                                        Branch

 

     OXYCODONE            Yes                      Take  by           [facility]

ers



     HCL/ACETAMI      1-25                               mouth.           ity of



     NOPHEN      19:49:                                              [place]



     (PERCOCET      27                                                Medical



     ORAL)                                                        [place]

 

     OXYCODONE            Yes                      Take  by           [facility]

ers



     HCL/ACETAMI      -25                               mouth.           ity of



     NOPHEN      19:49:                                              [place]



     (PERCOCET      27                                                Medical



     ORAL)                                                        [place]

 

     vancomycin      2022- Yes            125mg      125 mg,           Un

yoselyn



     (VANCOCIN)                                Oral, QID,           it

y of



     capsule 125      18:00: 21:59                          25 doses,           

[place]



     mg        00   :00                           First dose           Medical



                                                  (after           Branch



                                                  last           



                                                  modificati           



                                                  on) on 22 at           



                                                  1200, Last           



                                                  dose on           



                                                  22 at           



                                                  1200,           



                                                  ASAP<br>Fa           



                                                  culty           



                                                  member           



                                                  approving           



                                                  Non-formul           



                                                  maryanne            



                                                  medication           



                                                  :              



                                                  MEGADC<br&           



                                                  gt;Reason           



                                                  for            



                                                  non-formul           



                                                  maryanne use:           



                                                  SPECIFIC           



                                                  INDICATION           



                                                  FOR            



                                                  NONFORMULA           



                                                  RY             



                                                  PRODUCT<br           



                                                  >Reason           



                                                  for            



                                                  Anti-Infec           



                                                  tive:           



                                                  Documented           



                                                  Infection<           



                                                  br>Documen           



                                                  huma            



                                                  Infection           



                                                  Site:           



                                                  Abdominal<           



                                                  br>Duratio           



                                                  n of           



                                                  Therapy:           



                                                  14 days           

 

     levoFLOXaci            Yes            750mg      750 mg,           Un

yoselyn



     n         25                               Oral,           ity of



     (LEVAQUIN)      15:00:                               DAILY,           [place]



     tablet 750      00                                 First dose           Med

ical



     mg                                           (after           Branch



                                                  last           



                                                  modificati           



                                                  on) on 22 at           



                                                  0900,           



                                                  Until           



                                                  Discontinu           



                                                  ed,            



                                                  ASAP<br>Re           



                                                  ason for           



                                                  Anti-Infec           



                                                  tive:           



                                                  Empiric           



                                                  Therapy           



                                                  for            



                                                  Suspected           



                                                  Infection<           



                                                  br>Empiric           



                                                  Therapy           



                                                  Site:           



                                                  Urine<br>D           



                                                  uration of           



                                                  therapy:           



                                                  72 hours           

 

     lactobacill      2022- Yes       [id number] .5mg      Take 1         

  [facility]



     us        -25                          tablet by           ity of



     acidophilus      00:00: 05:59                          mouth 2           [facility]

[place]



               00   :00                           (two)           [facility]



                                                  times           [facility]



                                                  daily for           



                                                  30 days.           

 

     lactobacill      2022- Yes       [id number] .5mg      Take 1         

  [facility]



     us        --25                          tablet by           ity of



     acidophilus      00:00: 05:59                          mouth 2           [facility]

[place]



               00   :00                           (two)           [facility]



                                                  times           [facility]



                                                  daily for           



                                                  30 days.           

 

     vancomycin      2022- Yes       [id number] 125mg      Take 1         

  [facility]



     125 mg                                capsule by           ity of



     capsule      00:00: 05:59                          mouth 4           [place]



               00   :00                           (four)           Medical



                                                  times           [facility]



                                                  daily for           



                                                  5 days.           

 

     vancomycin      2022- Yes       [id number] 125mg      Take 1         

  [facility]



     125 mg                                capsule by           ity of



     capsule      00:00: 05:59                          mouth 4           [place]



               00   :00                           (four)           Medical



                                                  times           [facility]



                                                  daily for           



                                                  5 days.           

 

     traMADoL            Yes            50mg      50 mg,           [facility]



     (ULTRAM)                                     Oral,           ity of



     tablet 50      21:26:                               Q6HPRN,           [place]



     mg        10                                 Starting           Medical



                                                  on Mon           [facility]



                                                  22 at           



                                                  1526,           



                                                  Until           



                                                  Discontinu           



                                                  ed,            



                                                  Routine,           



                                                  Pain           



                                                  (scale           



                                                  4-6)           

 

     levoFLOXaci      2022- No             250mg      250 mg,           U

[facility]



     n                                   Oral, Q24H           ity of



     (LEVAQUIN)      19:30: 23:18                          ABX, First           

[place]



     tablet 250      00   :17                           dose           Medical



     mg                                           (after           [facility]



                                                  last           



                                                  modificati           



                                                  on) on 22 at           



                                                  1330,           



                                                  Until           



                                                  Discontinu           



                                                  ed,            



                                                  ASAP<br>Re           



                                                  ason for           



                                                  Anti-Infec           



                                                  tive:           



                                                  Empiric           



                                                  Therapy           



                                                  for            



                                                  Suspected           



                                                  Infection<           



                                                  br>Empiric           



                                                  Therapy           



                                                  Site:           



                                                  Urine<br>D           



                                                  uration of           



                                                  therapy:           



                                                  72 hours           

 

     HYDROmorpho      2022- No             .5mg      0.5 mg,           [facility]

[facility]



     ne                                  Slow IV           [facility] of



     (DILAUDID)      20:45: 18:23                          Push,           [place]



     injection      00   :44                           Q6HPRN,           Medical



     0.5 mg                                         Starting           [facility]



                                                  on Sun           



                                                  22 at           



                                                  1445,           



                                                  Until 22 at           



                                                  1223,           



                                                  Routine,           



                                                  Pain           



                                                  (scale           



                                                  7-10),           



                                                  breakthrou           



                                                  gh             



                                                  pain<br>Us           



                                                  e approved           



                                                  by             



                                                  (Faculty):           



                                                  ADC            



                                                  PROVIDER           

 

     oxyCODONE-a            Yes            2{tbl}      2 tablet,          

 [facility]



     cetaminophe                                     Oral,           ity of



     n         20:39:                               Q6HPRN,           [place]



     (PERCOCET)      03                                 Starting           Medic

al



     5-325 mg                                         on Sun           [facility]



     per tablet                                         22 at           



     2 tablet                                         1439,           



                                                  Until           



                                                  Discontinu           



                                                  ed,            



                                                  Routine,           



                                                  Pain           



                                                  (scale           



                                                  7-10)           

 

     HYDROmorpho      2022- No             .5mg      0.5 mg,           Un

[place]



     ne                                  Slow IV           ity of



     (DILAUDID)      00:00: 23:41                          Push,           [place]



     injection      00   :00                           ONCE, 1           Medical



     0.5 mg                                         dose, On           [facility]



                                                  Sat            



                                                  22 at           



                                                  1800,           



                                                  Routine<br           



                                                  >Use           



                                                  approved           



                                                  by             



                                                  (Faculty):           



                                                  ADC            



                                                  PROVIDER           

 

     ertapenem      2022- No             1000mg      1,000 mg,           

[facility]



     (INVANZ)                                IV             ity of



     1,000 mg in      15:45: 18:30                          Piggyback,          

 [place]



     NaCl 0.9%      00   :00                           Q24H ABX,           Medic

al



     (NS) 50 mL                                         First dose           [facility]

[facility]



     MINI-BAG                                         on Sat           



                                                  22 at           



                                                  0945,           



                                                  Until           



                                                  Discontinu           



                                                  ed,            



                                                  Administer           



                                                  over 30           



                                                  Minutes,           



                                                  50             



                                                  mL<br>Reas           



                                                  on for           



                                                  Anti-Infec           



                                                  tive:           



                                                  Empiric           



                                                  Therapy           



                                                  for            



                                                  Suspected           



                                                  Infection<           



                                                  br>Empiric           



                                                  Therapy           



                                                  Site:           



                                                  Urine<br>D           



                                                  uration of           



                                                  therapy:           



                                                  72             



                                                  hours<br>R           



                                                  estricted           



                                                  use            



                                                  approved           



                                                  by: ADC           



                                                  PROVIDER           

 

     lactobacill            Yes            .5mg      0.5 mg,           Uni

vers



     us                                       Oral, BID,           ity of



     acidophilus      02:00:                               First dose           

[place]



     tablet 0.5      00                                 on Fri           Medical



     mg                                           22 at           [facility]



                                                  2000,           



                                                  Until           



                                                  Discontinu           



                                                  ed,            



                                                  Routine           

 

     vancomycin      2022- No             250mg      250 mg,           Un

[facility]



     (VANCOCIN)                                Oral, QID,           it

y of



     capsule 250      02:00: 16:20                          First dose          

 [place]



     mg        00   :40                           (after           Medical



                                                  last           [facility]



                                                  reorder)           



                                                  on Fri           



                                                  22 at           



                                                  2000,           



                                                  Until           



                                                  Discontinu           



                                                  ed,            



                                                  ASAP&lt;br           



                                                  >Faculty           



                                                  member           



                                                  approving           



                                                  Non-formul           



                                                  maryanne            



                                                  medication           



                                                  :              



                                                  MEGADC<br>           



                                                  Reason for           



                                                  non-formul           



                                                  maryanne use:           



                                                  SPECIFIC           



                                                  INDICATION           



                                                  FOR            



                                                  NONFORMULA           



                                                  RY             



                                                  PRODUCT<br           



                                                  >Reason           



                                                  for            



                                                  Anti-Infec           



                                                  tive:           



                                                  Documented           



                                                  Infection<           



                                                  br>Documen           



                                                  huma            



                                                  Infection           



                                                  Site:           



                                                  Abdominal<           



                                                  br>Duratio           



                                                  n of           



                                                  Therapy:           



                                                  14 days           

 

     vancomycin      2022- No             250mg      250 mg,           Un

[place]



     (VANCOCIN)                                Oral,           ity of



     capsule 250      00:45: 00:34                          ONCE, 1           [facility]

[place]



     mg        00   :00                           dose, On           Medical



                                                  Fri            [facility]



                                                  22 at           



                                                  1845,           



                                                  ASAP<br>Fa           



                                                  culty           



                                                  member           



                                                  approving           



                                                  Non-formul           



                                                  maryanne            



                                                  medication           



                                                  :              



                                                  MEGADC<br>           



                                                  Reason for           



                                                  non-formul           



                                                  maryanne use:           



                                                  SPECIFIC           



                                                  INDICATION           



                                                  FOR            



                                                  NONFORMULA           



                                                  RY             



                                                  PRODUCT<br           



                                                  >Reason           



                                                  for            



                                                  Anti-Infec           



                                                  tive:           



                                                  Documented           



                                                  Infection<           



                                                  br>Documen           



                                                  huma            



                                                  Infection           



                                                  Site:           



                                                  Abdominal<           



                                                  br>Duratio           



                                                  n of           



                                                  Therapy:           



                                                  14 days           

 

     lidocaine      0 - No             5mL       5 mL,           Univer

s



     1% (PF)                                Subcutaneo           ity o

f



     (XYLOCAINE)      23:45: 02:25                          us, ONCE,           

[place]



     injection 5      00   :00                           1 dose, On           Me

dical



     mL                                           Fri            [facility]



                                                  22 at           



                                                  1745,           



                                                  Routine           

 

     NaCl 0.9%            Yes            10mL      10 mL,           Univer

s



     (NS)                                     Slow IV           ity of



     injection      23:38:                               Push, PRN,           [facility]

[place]



     10 mL      41                                 Starting           Medical



                                                  on Fri           [facility]



                                                  22 at           



                                                  1738,           



                                                  Until           



                                                  Discontinu           



                                                  ed,            



                                                  Routine,           



                                                  line           



                                                  maintenanc           



                                                  e              

 

     meropenem      2022- No             1g        1 g, IV           Univ

ers



     (MERREM)                           Piggyback,           it

y of



     g in NaCl      23:15: 14:33                          Q8H ABX,           [facility]

as



     0.9% (NS)      00   :35                           First dose           Medi

[place]



     100 mL                                         (after           [facility]



     MINI-BAG                                         last           



                                                  modificati           



                                                  on) on Thu           



                                                  22 at           



                                                  1715,           



                                                  Until           



                                                  Discontinu           



                                                  ed,            



                                                  Administer           



                                                  over 60           



                                                  Minutes,           



                                                  100            



                                                  mL<br>Rest           



                                                  ricted use           



                                                  approved           



                                                  by: ADC           



                                                  PROVIDER<b           



                                                  r&gt;Reaso           



                                                  n for           



                                                  Anti-Infec           



                                                  tive:           



                                                  Documented           



                                                  Infection<           



                                                  br>Documen           



                                                  huma            



                                                  Infection           



                                                  Site:           



                                                  Urine<br>D           



                                                  uration of           



                                                  Therapy: 7           



                                                  days           

 

     enoxaparin            Yes            40mg      40 mg,           Unive

rs



     (LOVENOX)                                     Subcutaneo           ity 

of



     injection      23:00:                               us, DAILY,           [facility]

[place]



     40 mg      00                                 First dose           Medical



                                                  on Thu           [facility]



                                                  22 at           



                                                  1700,           



                                                  Until           



                                                  Discontinu           



                                                  ed,            



                                                  Routine           

 

     meropenem      2022- No             1g        1 g, IV           Univ

ers



     (MERREM)                           Piggyback,           it

y of



     g in NaCl      16:00: 20:33                          Q8H ABX,           Eric

as



     0.9% (NS)      00   :04                           First dose           Medi

sarah



     100 mL                                         (after           [facility]



     MINI-BAG                                         last           



                                                  reorder)           



                                                  on Thu           



                                                  22 at           



                                                  1000,           



                                                  Until           



                                                  Discontinu           



                                                  ed,            



                                                  Administer           



                                                  over 60           



                                                  Minutes,           



                                                  100            



                                                  mL<br>Rest           



                                                  ricted use           



                                                  approved           



                                                  by: [facility]           



                                                  PROVIDER<b           



                                                  r>Reason           



                                                  for            



                                                  Anti-Infec           



                                                  tive:           



                                                  Documented           



                                                  Infection<           



                                                  br>Documen           



                                                  huma            



                                                  Infection           



                                                  Site:           



                                                  Urine<br>D           



                                                  uration of           



                                                  Therapy:           



                                                  Other (see           



                                                  Comments)           

 

     HYDROmorpho      0 2022- No             .5mg      0.5 mg,           Un

[facility]



     ne                                  Slow IV           ity of



     (DILAUDID)      14:29: 20:41                          Push,           [place]



     injection      58   :17                           Q4HPRN,           Medical



     0.5 mg                                         Starting           [facility]



                                                  on u           



                                                  22 at           



                                                  0829,           



                                                  Until Sun           



                                                  22 at           



                                                  1441,           



                                                  Routine,           



                                                  Pain           



                                                  (scale           



                                                  7-10)<br>U           



                                                  se             



                                                  approved           



                                                  by             



                                                  (Faculty):           



                                                  ADC            



                                                  PROVIDER           

 

     proMETHazin            Yes            25mg      25 mg,           Univ

ers



     e         1-                               Oral,           ity of



     (PHENERGAN)      09:42:                               Q4HPRN,           [facility]

as



     tablet 25      07                                 Starting           Medica

l



     mg                                           on Thu           [facility]



                                                  22 at           



                                                  0342,           



                                                  Until           



                                                  Discontinu           



                                                  ed,            



                                                  Routine,           



                                                  Nausea and           



                                                  Vomiting           



                                                  (N/V)           

 

     HYDROmorpho      0 202- No             .5mg      0.5 mg,           Un

[facility]



     ne                                  Slow IV           ity of



     (DILAUDID)      09:41: 12:04                          Push,           [place]



     injection      36   :38                           Q4HPRN,           Medical



     0.5 mg                                         Starting           [facility]



                                                  on u           



                                                  22 at           



                                                  0341,           



                                                  Until u           



                                                  22 at           



                                                  0604,           



                                                  Routine,           



                                                  Pain           



                                                  (scale           



                                                  7-10)<br>U           



                                                  se             



                                                  approved           



                                                  by             



                                                  (Faculty):           



                                                  ADC            



                                                  PROVIDER           

 

     proCHLORper            Yes            10mg      10 mg,           Univ

ers



     azine      1-20                               Slow IV           ity of



     (COMPAZINE)      09:07:                               Push,           [place]



     injection      27                                 Q6HPRN,           Medical



     10 mg                                         Starting           [facility]



                                                  on Thu           



                                                  22 at           



                                                  0307,           



                                                  Until           



                                                  Discontinu           



                                                  ed,            



                                                  Routine,           



                                                  Nausea and           



                                                  Vomiting           



                                                  (N/V)           

 

     acetaminoph      0      Yes            650mg      650 mg,           Un

[facility]



     en        1-20                               Oral,           ity of



     (TYLENOL)      09:07:                               Q6HPRN,           [place]



     tablet 650      10                                 Starting           Medic

al



     mg                                           on Thu           [facility]



                                                  22 at           



                                                  0307,           



                                                  Until           



                                                  Discontinu           



                                                  ed,            



                                                  Routine,           



                                                  Pain           



                                                  (scale           



                                                  1-3)           

 

     meropenem      2022- No             1g        1 g, IV           Univ

ers



     (MERREM) 1                                Piggyback,           it

y of



     g in NaCl      07:15: 08:49                          ONCE, 1           [place]

[place]



     0.9% (NS)      00   :00                           dose, On           Medica

l



     100 mL                                         Thu            [facility]



     MINI-BAG                                         22 at           



                                                  0115,           



                                                  Administer           



                                                  over 60           



                                                  Minutes,           



                                                  100            



                                                  mL<br>Rest           



                                                  ricted use           



                                                  approved           



                                                  by: [facility]           



                                                  PROVIDER<b           



                                                  r>Reason           



                                                  for            



                                                  Anti-Infec           



                                                  tive:           



                                                  Documented           



                                                  Infection<           



                                                  br>Documen           



                                                  huma            



                                                  Infection           



                                                  Site:           



                                                  Urine<br>D           



                                                  uration of           



                                                  Therapy:           



                                                  Other (see           



                                                  Comments)           

 

     cefdinir      2022- No             300mg      300 mg,           Univ

ers



     (OMNICEF)                                Oral,           ity of



     capsule 300      03:30: 02:55                          ONCE, 1           [facility]

[place]



     mg        00   :00                           dose, On           Medical



                                                  Mon            [facility]



                                                  22 at           



                                                  2130,           



                                                  ASAP<br>Re           



                                                  ason for           



                                                  Anti-Infec           



                                                  tive:           



                                                  Documented           



                                                  Infection<           



                                                  br>Documen           



                                                  huma            



                                                  Infection           



                                                  Site:           



                                                  Urine<br>D           



                                                  uration of           



                                                  Therapy: 7           



                                                  days           

 

     FENTanyl PF      2022- No             50ug      50 mcg,           Un

[facility]



     (SUBLIMAZE                                Slow IV           ity o

f



     (PF))      01:15: 03:26                          Push,           [place]



     injection      00   :00                           ONCE, 1           Medical



     50 mcg                                         dose, On           [facility]



                                                  Mon            



                                                  22 at           



                                                  1915, STAT           

 

     proMETHazin      2022- No             25mg      25 mg, IV           

[facility]



     e                                   Piggyback,           ity of



     (PHENERGAN)      01:15: 03:26                          ONCE, 1           [facility]

[facility]



     25 mg in      00   :00                           dose, On           Medical



     NaCl 0.9%                                         Mon            [facility]



     (NS) 50 mL                                         22 at           



     piggyback                                         1915, 50           



                                                  mL             

 

     cefdinir      2022- Yes       [id number] 300mg      Take 1           U

nivers



     300 mg                                capsule by           ity of



     capsule      00:00: 05:59                          mouth           [place]



               00   :00                           every 12           Medical



                                                  (twelve)           [facility]



                                                  hours for           



                                                  10 days.           

 

     cefdinir      2022- No        [id number] 300mg      Take 1           U

nivers



     300 mg                                capsule by           ity of



     capsule      00:00: 00:00                          mouth           [place]



               00   :00                           every 12           Medical



                                                  (twelve)           [facility]



                                                  hours for           



                                                  10 days.           

 

     FENTanyl PF      2022- No             50ug      50 mcg,           Un

[facility]



     (SUBLIMAZE                                Slow IV           ity o

f



     (PF))      02:00: 01:19                          Push,           [place]



     injection      00   :00                           ONCE, 1           Medical



     50 mcg                                         dose, On           [facility]



                                                  Sat            



                                                  1/15/22 at           



                                                  ,           



                                                  Routine           

 

     methocarbam      2022- No             1000mg      1,000 mg,         

  [facility]



     oL                                  Oral,           ity of



     (ROBAXIN)      02:00: 01:19                          ONCE, 1           [place]

[facility]



     tablet      00   :00                           dose, On           Medical



     1,000 mg                                         Sat            [facility]



                                                  1/15/22 at           



                                                  2000, ASAP           

 

     proMETHazin      2022- No             12.5mg      12.5 mg,          

 [facility]



     e         1-15 01-15                          IV             ity of



     (PHENERGAN)      22:46: 23:00                          Piggyback,          

 [place]



     12.5 mg in      00   :00                           ONCE, 1           Medica

l



     NaCl 0.9%                                         dose, On           [facility]



     (NS) 50 mL                                         Sat            



     piggyback                                         1/15/22 at           



                                                  1700, 50           



                                                  mL             

 

     FENTanyl PF      2022- No             50ug      50 mcg,           Un

[facility]



     (SUBLIMAZE      1-15 01-15                          Slow IV           ity o

f



     (PF))      22:45: 23:00                          Push,           [place]



     injection      00   :00                           ONCE, 1           Medical



     50 mcg                                         dose, On           [facility]



                                                  Sat            



                                                  1/15/22 at           



                                                  1645,           



                                                  Routine           

 

     methocarbam      -0      Yes       [id number] 1000mg      Take 2         

  [facility]



     oL 500 mg      1-15                               tablets by           ity 

of



     tablet      00:00:                               mouth 4           [place]



               00                                 (four)           Medical



                                                  times           [facility]



                                                  daily as           



                                                  needed for           



                                                  Pain           



                                                  (scale           



                                                  1-3).           

 

     methocarbam      -0      Yes       [id number] 1000mg      Take 2         

  [facility]



     oL 500 mg      1-15                               tablets by           ity 

of



     tablet      00:00:                               mouth 4           [place]



               00                                 (four)           Medical



                                                  times           [facility]



                                                  daily as           



                                                  needed for           



                                                  Pain           



                                                  (scale           



                                                  1-3).           

 

     methocarbam      0      Yes       [id number] 1000mg      Take 2         

  [facility]



     oL 500 mg      1-15                               tablets by           ity 

of



     tablet      00:00:                               mouth 4           [place]



               00                                 (four)           Medical



                                                  times           [facility]



                                                  daily as           



                                                  needed for           



                                                  Pain           



                                                  (scale           



                                                  1-3).           

 

     methocarbam      0      Yes       [id number] 1000mg      Take 2         

  [facility]



     oL 500 mg      1-15                               tablets by           ity 

of



     tablet      00:00:                               mouth 4           [place]



               00                                 (four)           Medical



                                                  times           [facility]



                                                  daily as           



                                                  needed for           



                                                  Pain           



                                                  (scale           



                                                  1-3).           

 

     methocarbam      0      Yes       [id number] 1000mg      Take 2         

  [facility]



     oL 500 mg      1-15                               tablets by           ity 

of



     tablet      00:00:                               mouth 4           [place]



               00                                 (four)           Medical



                                                  times           [facility]



                                                  daily as           



                                                  needed for           



                                                  Pain           



                                                  (scale           



                                                  1-3).           

 

     proMETHazin      2021 No             25mg      25 mg, IV           

[facility]



     e         -24                           Piggyback,           ity of



     (PHENERGAN)      23:15: 22:20                          ONCE, 1           [facility]

[facility]



     25 mg in      00   :00                           dose, On           Medical



     NaCl 0.9%                                         Wed            [facility]



     (NS) 50 mL                                         21           



     piggyback                                         at 1715,           



                                                  50 mL           

 

     FENTanyl PF      2021 No             75ug      75 mcg,           Un

[facility]



     (SUBLIMAZE                                Slow IV           ity o

f



     (PF))      22:15: 22:20                          Push,           [place]



     injection      00   :00                           ONCE, 1           Medical



     75 mcg                                         dose, On           [facility]



                                                  Wed            



                                                  21           



                                                  at 1615,           



                                                  Routine           

 

     fidaxomicin      2021      Yes       [id number] 200mg      Take 1          

 [facility]



     200 mg      1-24                               tablet by           ity of



     tablet      00:00:                               mouth 2           [place]



               00                                 (two)           Medical



                                                  times           [facility]



                                                  daily.           

 

     proMETHazin      2021      Yes       [id number] 25mg      Take 1           

[facility]



     e 25 mg      1-24                               tablet by           [facility] of



     tablet      00:00:                               mouth           [place]



               00                                 every 6           Medical



                                                  (six)           [facility]



                                                  hours as           



                                                  needed for           



                                                  Nausea and           



                                                  Vomiting           



                                                  (N/V).           

 

     fidaxomicin      2021      Yes       [id number] 200mg      Take 1          

 [facility]



     200 mg      1-24                               tablet by           [facility] of



     tablet      00:00:                               mouth 2           [place]



               00                                 (two)           Medical



                                                  times           [facility]



                                                  daily.           

 

     proMETHazin      2021      Yes       [id number] 25mg      Take 1           

[facility]



     e 25 mg      1-24                               tablet by           ity of



     tablet      00:00:                               mouth           [place]



               00                                 every 6           Medical



                                                  (six)           [facility]



                                                  hours as           



                                                  needed for           



                                                  Nausea and           



                                                  Vomiting           



                                                  (N/V).           

 

     cholestyram      2021      Yes                                     [facility]

s



     ine 4 gram      1-24                                              ity of



     packet      00:00:                                              [place]



                                                               Medical



                                                                 [facility]

 

     fidaxomicin      2021      Yes       [id number] 200mg      Take 1          

 [facility]



     200 mg      1-24                               tablet by           [facility] of



     tablet      00:00:                               mouth 2           [place]



               00                                 (two)           Medical



                                                  times           [facility]



                                                  daily.           

 

     proMETHazin      2021      Yes       [id number] 25mg      Take 1           

[facility]



     e 25 mg      1-24                               tablet by           [facility]



     tablet      00:00:                               mouth           [place]



               00                                 every 6           Medical



                                                  (six)           [facility]



                                                  hours as           



                                                  needed for           



                                                  Nausea and           



                                                  Vomiting           



                                                  (N/V).           

 

     cholestyram      2021      Yes                                     [facility]

s



     ine 4 gram      1-24                                              ity of



     packet      00:00:                                              [place]



                                                               Medical



                                                                 [facility]

 

     cholestyram      2021      Yes                                     [facility]

s



     ine 4 gram      1-24                                              ity of



     packet      00:00:                                              [place]



                                                               Medical



                                                                 [facility]anch

 

     cholestyram      2021      Yes                                     [facility]

s



     ine 4 gram      1-24                                              ity of



     packet      00:00:                                              [place]



                                                               Medical



                                                                 Branch

 

     cholestyram      2021      Yes                                     [facility]

s



     ine 4 gram      1-24                                              ity of



     packet      00:00:                                              [place]



                                                               Medical



                                                                 Branch

 

     fidaxomicin      2021      Yes       [id number] 200mg      Take 1          

 [facility]



     200 mg      1-24                               tablet by           ity [facility]



     tablet      00:00:                               mouth 2           [place]



                                                (two)           Medical



                                                  times           [facility]



                                                  daily.           

 

     proMETHazin      2021      Yes       [id number] 25mg      Take 1           

[facility]



     e 25 mg      1-24                               tablet by           ity of



     tablet      00:00:                               mouth           [place]



               00                                 every 6           Medical



                                                  (six)           [facility]



                                                  hours as           



                                                  needed for           



                                                  Nausea and           



                                                  Vomiting           



                                                  (N/V).           

 

     fidaxomicin      2021- No        [id number] 200mg      Take 1         

  [facility]



     200 mg      1-24 01-25                          tablet by           ity of



     tablet      00:00: 00:00                          mouth 2           [place]



               00   :00                           (two)           Medical



                                                  times           [facility]



                                                  daily.           

 

     proMETHazin      2021- No        [id number] 25mg      Take 1          

 [facility]



     e 25 mg      1-24 01-25                          tablet by           ity of



     tablet      00:00: 00:00                          mouth           [place]



               00   :00                           every 6           Medical



                                                  (six)           [facility]



                                                  hours as           



                                                  needed for           



                                                  Nausea and           



                                                  Vomiting           



                                                  (N/V).           

 

     FENTanyl PF      2021- No             50ug      50 mcg,           Un

[facility]



     (SUBLIMAZE      5-10 05-10                          Intravenou           it

y of



     (PF))      02:45: 01:34                          s, ONCE, 1           [place]



     injection      00   :00                           dose, Sun           Medic

al



     50 mcg                                         21 at           Branch



                                                  2145,           



                                                  Routine           

 

     cefTRIAXone      2021- No             1000mg      1,000 mg,         

  [facility]



     (ROCEPHIN)      5-10 05-10                          IV             ity of



     1,000 mg in      02:30: 01:55                          Piggyback,          

 [place]



     NaCl 0.9%      00   :00                           ONCE, 1           Medical



     (NS) 50 mL                                         dose, [facility]

ch



     MINI-BAG                                         21 at           



                                                  2130, 50           



                                                  mL<br>Reas           



                                                  on for           



                                                  Anti-Infec           



                                                  tive:           



                                                  Documented           



                                                  Infection<           



                                                  br>Documen           



                                                  huma            



                                                  Infection           



                                                  Site:           



                                                  Urine<br>D           



                                                  uration of           



                                                  Therapy: 7           



                                                  days           

 

     famotidine      2021- No             20mg      20 mg,           Univ

ers



     (PEPCID      5-10 05-10                          Slow IV           ity of



     (PF))      01:00: 00:12                          Push,           [place]



     injection      00   :00                           ONCE, 1           Medical



     20 mg                                         dose, [facility]



                                                  21 at           



                                                  2000, ASAP           

 

     proMETHazin      2021- No             25mg      25 mg, IV           

[facility]



     e         5-10 05-10                          Piggyback,           ity of



     (PHENERGAN)      00:45: 00:11                          ONCE, 1           [place]

[place]



     25 mg in      00   :00                           dose, Isabelle           Medica

l



     NaCl 0.9%                                         21 at           [facility]

h



     (NS) 50 mL                                         1945, 50           



     piggyback                                         mL             

 

     FENTanyl PF      2021- No             50ug      50 mcg,           Un

[facility]



     (SUBLIMAZE      5-10 05-10                          Intravenou           it

y of



     (PF))      00:45: 00:12                          s, ONCE, 1           [place]



     injection      00   :00                           dose, Sun           Medic

al



     50 mcg                                         21 at           [facility]



                                                  1945,           



                                                  Routine           

 

     NaCl 0.9%      2021- No             1000mL      at 999           Uni

vers



     (NS) bolus      5-10 05-10                          mL/hr,           ity of



     infusion      00:00: 01:47                          1,000 mL,           Eric

as



     1,000 mL      00   :00                           IV             Medical



                                                  Infusion,           [place]



                                                  ONCE, 1           



                                                  dose, [place]           



                                                  21 at           



                                                  1900, ASAP           

 

     cefdinir      2021- No        [id number] 300mg      Take 1           U

nivers



     300 mg       05-20                          capsule by           ity of



     capsule      00:00: 04:59                          mouth 2           [place]



               00   :00                           (two)           Medical



                                                  times           Branch



                                                  daily for           



                                                  10 days.           

 

     buPROPion      2021- No             150mg QD   Take 1           [facility]



     (WELLBUTRIN      1-17 01-16                          tablet           [facility]

 -



     XL) 150 MG      00:00: 23:59                          (150 mg           Med

ical



     24 hr      00   :00                           total) by           Center



     tablet                                         mouth           



                                                  daily.           

 

     citalopram       No             40mg QD   Take 1           [facility]



     (CELEXA) 40      1-17 -16                          tablet (40           L

ukes -



     MG tablet      00:00: 23:59                          mg total)           Me

dical



               00   :00                           by mouth           Center



                                                  daily.           

 

     oxyCODONE-a            Yes            1{tbl}      Take 1           [facility]



     cetaminophe      1-16                               tablet by           Ni

es -



     n         14:44:                               mouth           Medical



     (PERCOCET)      50                                 every 4           Center



      mg                                         (four)           



     per tablet                                         hours as           



                                                  needed for           



                                                  Pain.           

 

     zinc            Yes            5g   Q.5D Apply 5 g           [facility]



     oxide-yuan      1-16                               topically           Ni

es -



     latum      00:00:                               2 (two)           Medical



     (CRITIC-AID      00                                 times           Center



     ) 20-51 %                                         daily.           



     Pste                                                        



     topical                                                        



     paste                                                        

 

     meropenem            Yes            1g        Inject 1 g           [facility]



     (MERREM)      1-16                               intravenou           [facility]

 -



     MBP 1 gm in      00:00:                               sly every           M

edical



     100 mL NS      00                                 8 (eight)           [facility]

r



                                                  hours.           

 

     insulin            Yes            58U  Q.5D Inject 58           [facility]

t



     glargine      1-16                               Units           [facility]



     (LANTUS)      00:00:                               subcutaneo           Med

ical



     100 unit/mL      00                                 usly 2           Center



     injection                                         (two)           



                                                  times           



                                                  daily Use           



                                                  as             



                                                  directed.           

 

     insulin      2021- No             0U        Inject           [facility]



     lispro      1-16 01-15                          0-12 Units           [facility] 

-



     (HUMALOG)      00:00: 23:59                          subcutaneo           M

edical



     100 unit/mL      00   :00                           usly 3           Center



     injection                                         (three)           



                                                  times           



                                                  daily           



                                                  before           



                                                  meals.           

 

     insulin      2021- No             25U       Inject 25           [facility]



     lispro      1-16 01-15                          Units           [facility]



     (HUMALOG)      00:00: 23:59                          subcutaneo           M

edical



     100 unit/mL      00   :00                           usly 3           Center



     injection                                         (three)           



                                                  times           



                                                  daily           



                                                  before           



                                                  meals.           

 

     normal      2018      No                       1,000 mL,           [facility]

a



     saline 0.9%                                     Rate: 100           l



     IV 1,000 mL      11:04:                               ml/hr,           [facility]

jorge



                                                Infuse           



                                                  over: 10           



                                                  hr, Route:           



                                                  IV, Dosing           



                                                  Weight           



                                                  131.818           



                                                  kg, Total           



                                                  Volume:           



                                                  1,000,           



                                                  Start           



                                                  date:           



                                                  18           



                                                  5:04:00           



                                                  CST,           



                                                  Duration:           



                                                  30 day,           



                                                  Stop date:           



                                                  18           



                                                  5:03:00           



                                                  CST, 2.4,           



                                                  m2             

 

     Magnesium      2018      No                       Notes:           [facility]

a



     Sulfate                                     WASTE: F/P           l



               10:36:                               - Sink; E           [facility]



                                                -              



                                                  Municipal           



                                                  Trash Bin           

 

     Isolyte S      2018      No                       Notes:           [facility]

a



     PH-7.4                                     (Same as:           l



     (Bolus) IV      10:36:                               Isolyte S           He

rmann



                                                PH 7.4)           

 

     Phenergan      2018      No                       12.5 mg,           [facility]

ra[facility]



               08                               0.5 mL,           l



               10:35:                               Route:           [facility]                                 IVPB, Drug           



                                                  form: INJ,           



                                                  ONCE,           



                                                  Dosing           



                                                  Weight           



                                                  131.818,           



                                                  kg,            



                                                  Priority:           



                                                  STAT,           



                                                  Start           



                                                  date:           



                                                  18           



                                                  4:35:00           



                                                  CST, Stop           



                                                  date:           



                                                  18           



                                                  4:35:00           



                                                  CST            

 

     Citalopram Citalopram       Yes  Na [place]                1 tablet     

      [facility]



     Hydrobromid Hydrobromid 7-16                                              L

[facility] -



     e    e    00:00:                                              [facility]



                                                                 [facility]

 

     Seroquel Seroquel       Yes  Na [place]                1 tablet         

  [facility]



               7-16                                              [facility]



               00:00:                                              [facility]



                                                                 [facility]

 

     Docusate            Yes                      100 mg = 1           Mem

oria



     Sodium 100      5-08                               cap, PO,           l



     MG Oral      14:56:                               BID, 0           [place]



     Capsule      00                                 Refill(s)           

 

     Zosyn            Yes                      0              [facility]



               5-08                               Refill(s)           l



               14:56:                                              [place]



               00                                                

 

     celecoxib            Yes                      200 mg = 1           Me

moria



     200 mg oral      5-08                               cap, PO,           l



     capsule      14:56:                               BID, 0           [place]



               00                                 Refill(s)           

 

     ascorbic            Yes                      500 mg = 1           Mem

oria



     acid      5-08                               tab, PO,           l



               14:56:                               BID, 0           [facility]



               00                                 Refill(s)           

 

     acetaminoph            Yes                      1,000 mg =           

[facility]



     en 500 mg      5-08                               2 tab, PO,           l



     oral tablet      14:56:                               Q6Hnow, 0           H

ermann



               00                                 Refill(s)           

 

     Oxycodone            Yes                      5 mg = 1           [facility]

[facility]



     Hydrochlori      5-08                               tab, PO,           l



     de 5 MG      14:56:                               Q4H, PRN           [facility]

n



     Oral Tablet      00                                 Pain Score           



                                                  4-6, 0           



                                                  Refill(s)           

 

     zinc            Yes                      220 mg = 1           [facility]



     sulfate 220      5-08                               cap, PO,           l



     mg oral      14:56:                               Daily, 0           [facility]

n



     capsule      00                                 Refill(s)           

 

     multivitami            Yes                      1 tab, PO,           

[facility]



     n         5-08                               Daily, 0           l



               14:56:                               Refill(s)           [facility]



                                                               

 

     methocarbam            Yes                      1,000 mg =           

[facility]



     ol 500 mg      5-08                               2 tab, PO,           l



     oral tablet      14:56:                               Q8H, 0           [facility]

[facility]



               00                                 Refill(s)           

 

     LORazepam            Yes                      0.5 mg = 1           [facility]

[facility]



     0.5 mg oral      5-08                               tab, PO,           l



     tablet      14:56:                               Q8H, PRN           [facility]



               00                                 Anxiety, 0           



                                                  Refill(s)           

 

     Lidocaine            Yes                      3 patch,           [facility]

[facility]



     Hydrochlori      5-08                               TOP,           l



     de 0.05      14:56:                               Daily,           [facility]



     MG/MG      00                                 Remove           



     Transdermal                                         after 12           



     Patch                                         hours, 0           



     [Lidoderm]                                         Refill(s)           

 

     Robaxin            No                       Notes:           [facility]



               5-06                               (Same           l



               21:00:                               as:Robaxin           [facility]



                                                )              

 

     Oxycodone            No                       Notes:           [facility]

a



     Hydrochlori      5-06                               (Same as:           l



     de 5 MG      18:06:                               Roxicodone           [facility]

[facility]



     Oral Tablet      00                                 )              

 

     Ativan            No                       Notes:           [facility]



               5-06                               (Same as:           l



               15:18:                               Ativan)           [facility]



               00                                                

 

     Trazodone            No                       Notes:           [facility]

a



     Hydrochlori      5-06                               (Same As:           l



     de 50 MG      02:00:                               Desyrel)           [facility]

nn



     Oral Tablet      00                                                

 

     remove            No                       Notes:           [facility]



     patch      5-06                               Remove           l



               02:00:                               patch 12           [facility]



               00                                 hours           



                                                  after           



                                                  applicatio           



                                                  n each           



                                                  day.           

 

     Oxycodone            No                       Notes:           [facility]

a



     Hydrochlori      5-05                               (Same as:           l



     de 5 MG      22:01:                               Roxicodone           [facility]

[facility]



     Oral Tablet      00                                 )              

 

     Celebrex            No                       Notes:           [facility]



               5-05                               NSAID.           l



               22:00:                               Please           [facility]



               00                                 check           



                                                  indication           



                                                  . Not for           



                                                  seizure.           



                                                  (Same As:           



                                                  CeleBREX)           

 

     Vancomycin            No                        2001 mg:           [facility]

[facility]



               5-05                               infuse           l



               21:00:                               over 2.5           [facility]



               00                                 hours           

 

     Lidocaine            No                       Notes:           [facility]

a



     Hydrochlori      5-05                               Apply only           l



     de 0.05      14:00:                               once for           [facility]

n



     MG/MG      00                                 up to 12           



     Transdermal                                         hours in a           



     Patch                                         24-hour           



     [Lidoderm]                                         period (12           



                                                  hours on           



                                                  and 12           



                                                  hours           



                                                  off).           



                                                  (Same as:           



                                                  Lidoderm)           



                                                  "Remove           



                                                  old patch           



                                                  before           



                                                  applicatio           



                                                  n of new           



                                                  patch"           

 

     Phenergan            No                       Notes: Do           [facility]

[facility]



               5-04                               not give           l



               22:40:                               IV push.           [facility]                                 (Same as:           



                                                  Phenergan)           

 

     Dilaudid            No                       Notes:           [facility]



               5-04                               Same as           l



               22:40:                               Dilaudid           [facility]



               00                                                

 

     Tramadol            No                       Notes: Not           [facility]

[facility]



               5-04                               to exceed           l



               22:00:                               400mg/day.           [facility]



               00                                 (Same As:           



                                                  Ultram)           

 

     gabapentin            No                       Notes:           [facility]

[facility]



               5-04                               (Same as:           l



               22:00:                               Neurontin)           [facility]



               00                                                

 

     Acetaminoph            No                       Notes: Max           

[facility]



     en        5-04                               acetaminop           l



               22:00:                               hen 4000           [facility]



               00                                 mg/day (4           



                                                  gm/day).           



                                                  (Same as:           



                                                  Tylenol           



                                                  Extra           



                                                  Strength)           

 

     Robaxin            No                       Notes:           [facility]



               5-04                               (Same           l



               22:00:                               as:Robaxin           [facility]



               00                                 )              

 

     Oxycodone            No                       Notes:           [facility]

a



     Hydrochlori      5-04                               (Same as:           l



     de 5 MG      21:33:                               Roxicodone           [facility]

[facility]



     Oral Tablet      00                                 )              

 

     Beneprotein            No                       Notes:           [facility]

[facility]



     7 gm pkt      5-04                               (Same as:           l



               21:30:                               Beneprotei                                            n)             

 

     Acetaminoph            No                       1 tab, PO,           

[facility]



     en 325 MG /      5-04                               TID, 0           l



     Oxycodone      17:12:                               Refill(s)           [facility]

[facility]



     Hydrochlori      00                                                



     de 10 MG                                                        



     Oral Tablet                                                        



     [Percocet                                                        



     10/325]                                                        

 

     Dilaudid            No                       0.5 mg,           [facility]

a



               5-04                               0.25 mL,           l



               16:01:                               Route:                                            IVP, Drug           



                                                  form: INJ,           



                                                  ONCE,           



                                                  Start           



                                                  date:           



                                                  18           



                                                  11:01:00           



                                                  CDT, Stop           



                                                  date:           



                                                  18           



                                                  11:01:00           



                                                  CDT            

 

     Phenergan            No                       Notes:           [facility]

a



                                              (Same as:           l



               15:43:                               Phenergan)                                                           

 

     Dilaudid            No                       2 mg,           [facility]



               5-04                               Route:           l



               15:43:                               IVP, ONCE,                                            Dosing           



                                                  Weight           



                                                  127.027,           



                                                  kg,            



                                                  Priority:           



                                                  STAT,           



                                                  Start           



                                                  date:           



                                                  18           



                                                  10:43:00           



                                                  CDT, Stop           



                                                  date:           



                                                  18           



                                                  10:43:00           



                                                  CDT            

 

     Docusate            No                       Notes:           [facility]



               -                               (Same as:           l



               14:00:                               Colace)                                            (Do Not           



                                                  Crush)           

 

     Zinc            No                       Notes:           [facility]



     Sulfate                                     (Zinc           l



               14:00:                               sulfate                                            capsule) -           



                                                  220 mg           



                                                  Zinc           



                                                  sulfate =           



                                                  50 mg           



                                                  elemental           



                                                  zinc  Same           



                                                  as Zinc           



                                                  Sulfate           

 

     ascorbic            No                       Notes:           [facility]



     acid      -                               (Same as:           l



               14:00:                               Vitamin C)                                                           

 

     multivitami            No                       Notes:           [facility]

[facility]



     n                                        (Same           l



               14:00:                               as:Thera)                                            WASTE: F/P           



                                                  - Black; E           



                                                  -              



                                                  Municipal           



                                                  Trash Bin           



                                                  Take with           



                                                  food.           

 

     Naproxen            No                       Notes:           [facility]



               5-04                               (Same as:           l



               07:00:                               Naprosyn)                                            Take with           



                                                  food.           

 

     Zosyn            No                       Notes:           [facility]



               5-04                               (Same as:           l



               07:00:                               Zosyn)                                            Dosing           



                                                  based on           



                                                  Piperacill           



                                                  in             



                                                  component           



                                                   ***           



                                                  MEDICATION           



                                                  WASTE ***           



                                                  Product           



                                                  Size:           



                                                  3375 mg           



                                                  Product           



                                                  Wasted:           



                                                  ___ mg           

 

     Vancomycin            No                        2001 mg:           [facility]

[facility]



               5-04                               infuse           l



               07:00:                               over 2.5           [facility]



               00                                 hours  For           



                                                  adult           



                                                  patients           



                                                  only:           



                                                  Round to           



                                                  nearest           



                                                  250 mg per           



                                                  Medical           



                                                  Staff           



                                                  approval           



                                                  ***            



                                                  MEDICATION           



                                                  WASTE ***           



                                                  Product           



                                                  Size:           



                                                  1000 mg           



                                                  Product           



                                                  Wasted:           



                                                  ___ mg           

 

     Enoxaparin            No                       Notes:           [facility]

[facility]



               5-04                               (Same as:           l



               07:00:                               Lovenox)           [facility]



               00                                                

 

     Sodium            No                       1,000 mL,           [facility]

a



     Chloride                                     Rate: 125           l



     0.9% IV      06:46:                               ml/hr,           [facility]



     1,000 mL      00                                 Infuse           



                                                  over: 8           



                                                  hr, Route:           



                                                  IV, Dosing           



                                                  Weight           



                                                  127.27 kg,           



                                                  Total           



                                                  Volume:           



                                                  1,000,           



                                                  Start           



                                                  date:           



                                                  18           



                                                  1:46:00           



                                                  CDT,           



                                                  Duration:           



                                                  30 day,           



                                                  Stop date:           



                                                  18           



                                                  1:45:00           



                                                  CDT, 2.44,           



                                                  m2             

 

     Saline            No                       Notes:           [facility]



     Flush 0.9%                                     (Same as:           l



               06:46:                               BD             [facility]



                                                Posiflush)           

 

     Acetaminoph            No                       Notes: Do           [facility]

[facility]



     en        -04                               not exceed           l



               06:46:                               4 gm/day.           [facility]



                                                (Same as:           



                                                  Tylenol)           

 

     Acetaminoph            No                       Notes:           [facility]

[facility]



     en 325 MG /      5-                               (Same as:           l



     Hydrocodone      06:46:                               Norco           [facility]

nn



     Bitartrate      00                                 325/5)  Do           



     5 MG Oral                                         not exceed           



     Tablet                                         4gm/day of           



                                                  acetaminop           



                                                  hen.           

 

     Reglan            No                       Notes:           [facility]



               5-04                               (Same as:           l



               04:27:                               Reglan)           [facility]



               00                                                

 

     Benadryl            No                       Notes:           [facility]



               5-04                               (Same as:           l



               04:27:                               Benadryl)           [facility]



                                                               

 

     Magnesium            No                       Notes:           [facility]

a



     Sulfate                                     WASTE: F/P           l



               04:26:                               - Sink; E           [facility]



                                                -              



                                                  Municipal           



                                                  Trash Bin           

 

     Sodium            No                       1,000 mL,           [facility]

a



     Chloride      5-04                               1000           l



     0.9%      04:26:                               ml/hr,           [facility]



     (Bolus) IV      00                                 Infuse           



                                                  Over: 1           



                                                  hr, Route:           



                                                  IV, 1,000,           



                                                  Drug form:           



                                                  INJ, ONCE,           



                                                  Priority:           



                                                  STAT,           



                                                  Dosing           



                                                  Weight           



                                                  127.273           



                                                  kg, Start           



                                                  date:           



                                                  18           



                                                  23:26:00           



                                                  CDT, Stop           



                                                  date:           



                                                  18           



                                                  23:26:00           



                                                  CDT            

 

     Zosyn            No                       4.5 gm,           [facility]



                                              Route:           l



               04:10:                               IVPB,           [place]                                 ONCE,           



                                                  Dosing           



                                                  Weight           



                                                  127.273,           



                                                  kg,            



                                                  Priority:           



                                                  STAT,           



                                                  Start           



                                                  date:           



                                                  18           



                                                  23:10:00           



                                                  CDT, Stop           



                                                  date:           



                                                  18           



                                                  23:10:00           



                                                  CDT, ABX           



                                                  Indication           



                                                  :              



                                                  Bacteremia           

 

     Vancomycin            No                         mg:           Me

[facility]



                                              infuse           l



               04:10:                               over 2.5           [facility]                                 hours  For           



                                                  adult           



                                                  patients           



                                                  only:           



                                                  Round to           



                                                  nearest           



                                                  250 mg per           



                                                  Medical           



                                                  Staff           



                                                  approval           



                                                  ***            



                                                  MEDICATION           



                                                  WASTE ***           



                                                  Product           



                                                  Size:           



                                                  1000 mg           



                                                  Product           



                                                  Wasted:           



                                                  ___ mg           

 

     normal            No                       1,000 mL,           [facility]

a



     saline 0.9%                                     Rate: 75           l



     IV 1,000 mL      03:39:                               ml/hr,                                            Infuse           



                                                  over: 13.3           



                                                  hr, Route:           



                                                  IV, Dosing           



                                                  Weight           



                                                  127.273           



                                                  kg, Total           



                                                  Volume:           



                                                  1,000,           



                                                  Start           



                                                  date:           



                                                  18           



                                                  22:39:00           



                                                  CDT,           



                                                  Duration:           



                                                  30 day,           



                                                  Stop date:           



                                                  18           



                                                  22:38:00           



                                                  CDT, 2.36,           



                                                  m2             

 

     Acetaminoph            No                       Notes: Max           

[facility]



     en                                       acetaminop           l



               02:56:                               hen 4000           [facility]                                 mg/day (4           



                                                  gm/day).           



                                                  (Same as:           



                                                  Tylenol           



                                                  Extra           



                                                  Strength)           

 

     Rocephin            No                       Notes:           [facility]



                                              (Same As:           l



               02:21:                               Rocephin).           [place] [place]                                   ***           



                                                  MEDICATION           



                                                  WASTE ***           



                                                  Product           



                                                  Size:           



                                                  1000 mg           



                                                  Product           



                                                  Wasted:           



                                                  _0__ mg           

 

     Morphine            No                       4 mg,           [facility]



                                              Route:           l



               00:05:                               IVP, ONCE,                                            Dosing           



                                                  Weight           



                                                  127.273,           



                                                  kg,            



                                                  Priority:           



                                                  STAT,           



                                                  Start           



                                                  date:           



                                                  18           



                                                  19:05:00           



                                                  CDT, Stop           



                                                  date:           



                                                  18           



                                                  19:05:00           



                                                  CDT            

 

     Benadryl            No                       Notes:           [facility]



                                              (Same as:           l



               23:14:                               Benadryl)           [facility]



                                                               

 

     Benadryl            No                       Notes:           [facility]



               5-03                               (Same as:           l



               22:56:                               Benadryl)                                                           

 

     Morphine      2018-0      No                       4 mg, 1           [facility]

a



               5-03                               mL, Route:           l



               22:56:                               IVP, Drug                                            form:           



                                                  SOLN,           



                                                  ONCE,           



                                                  Dosing           



                                                  Weight           



                                                  127.273,           



                                                  kg,            



                                                  Priority:           



                                                  STAT,           



                                                  Start           



                                                  date:           



                                                  18           



                                                  17:56:00           



                                                  CDT, Stop           



                                                  date:           



                                                  18           



                                                  17:56:00           



                                                  CDT            

 

     OXYCODONE      2017      Yes                      Take  by           [facility]

ers



     HCL/ACETAMI      2-20                               mouth.           [facility] of



     NOPHEN      10:03:                                              [place]



     (PERCOCET      17                                                Medical



     ORAL)                                                        [facility]

 

     OXYCODONE      2017      Yes                      Take  by           [facility]

ers



     HCL/ACETAMI      2-20                               mouth.           [facility] of



     NOPHEN      04:03:                                              [place]



     (PERCOCET      17                                                Medical



     ORAL)                                                        [facility]

 

     OXYCODONE      2017      Yes                      Take  by           Un[facility]

ers



     HCL/ACETAMI      2-20                               mouth.           ity of



     NOPHEN      04:03:                                              [place]



     (PERCOCET      17                                                Medical



     ORAL)                                                        [facility]

 

     OXYCODONE      2017      Yes                      Take  by           [facility]

ers



     HCL/ACETAMI      2-20                               mouth.           ity of



     NOPHEN      04:03:                                              [place]



     (PERCOCET      17                                                Medical



     ORAL)                                                        [facility]

 

     OXYCODONE      2017      Yes                      Take  by           Un[facility]

ers



     HCL/ACETAMI      2-20                               mouth.           ity of



     NOPHEN      04:03:                                              [place]



     (PERCOCET      17                                                Medical



     ORAL)                                                        [facility]

 

     dicyclomine      2017      Yes            20mg      Take 1           [facility]

ers



     (BENTYL) 20      2-20                               tablet by           [facility]

 of



     mg tablet      00:00:                               mouth 4           [place]



               00                                 (four)           Medical



                                                  times           [facility]



                                                  daily.           

 

     proMETHazin      2017      Yes            25mg      Take 1           [facility]

ers



     e 25 mg      2-20                               tablet by           [facility] of



     tablet      00:00:                               mouth           [place]



               00                                 every 6           Medical



                                                  (six)           Branch



                                                  hours as           



                                                  needed for           



                                                  Nausea and           



                                                  Vomiting           



                                                  (N/V).           

 

     proMETHazin      2017      Yes            25mg      Take 1           [facility]

ers



     e 25 mg      2-20                               tablet by           ity of



     tablet      00:00:                               mouth           [place]



               00                                 every 6           Medical



                                                  (six)           Branch



                                                  hours as           



                                                  needed for           



                                                  Nausea and           



                                                  Vomiting           



                                                  (N/V).           

 

     proMETHazin      2017      Yes            25mg      Take 1           [facility]

ers



     e 25 mg      2-20                               tablet by           ity of



     tablet      00:00:                               mouth           [place]



               00                                 every 6           Medical



                                                  (six)           Branch



                                                  hours as           



                                                  needed for           



                                                  Nausea and           



                                                  Vomiting           



                                                  (N/V).           

 

     dicyclomine      -      Yes            20mg      Take 1           [facility]

ers



     (BENTYL) 20      2-20                               tablet by           ity

 of



     mg tablet      00:00:                               mouth 4           [place]



               00                                 (four)           Medical



                                                  times           [facility]



                                                  daily.           

 

     proMETHazin      2017      Yes            25mg      Take 1           [facility]

ers



     e 25 mg      2-20                               tablet by           ity of



     tablet      00:00:                               mouth           [place]



               00                                 every 6           Medical



                                                  (six)           Branch



                                                  hours as           



                                                  needed for           



                                                  Nausea and           



                                                  Vomiting           



                                                  (N/V).           

 

     dicyclomine      -      Yes            20mg      Take 1           [facility]

ers



     (BENTYL) 20      2-20                               tablet by           ity

 of



     mg tablet      00:00:                               mouth 4           [place]



               00                                 (four)           Medical



                                                  times           [facility]



                                                  daily.           

 

     proMETHazin      -      Yes            25mg      Take 1           [facility]

ers



     e 25 mg      2-20                               tablet by           ity of



     tablet      00:00:                               mouth           [place]



               00                                 every 6           Medical



                                                  (six)           Branch



                                                  hours as           



                                                  needed for           



                                                  Nausea and           



                                                  Vomiting           



                                                  (N/V).           

 

     proMETHazin      2017- No             25mg      Take 1           [facility]

[facility]



     e 25 mg      2-20 01-25                          tablet by           [facility] of



     tablet      00:00: 00:00                          mouth           [place]



               00   :00                           every 6           Medical



                                                  (six)           Branch



                                                  hours as           



                                                  needed for           



                                                  Nausea and           



                                                  Vomiting           



                                                  (N/V).           

 

     dicyclomine      2017- No             20mg      Take 1           [facility]

[facility]



     (BENTYL) 20      2-20 01-15                          tablet by           it

y of



     mg tablet      00:00: 00:00                          mouth 4           [place]

[facility]



               00   :00                           (four)           Medical



                                                  times           [facility]



                                                  daily.           

 

     proMETHazin      -0      Yes            25mg      Take 1           [facility]

ers



     e 25 mg      1-04                               tablet by           [facility] of



     tablet      00:00:                               mouth           [place]



               00                                 every 6           Medical



                                                  (six)           Branch



                                                  hours as           



                                                  needed for           



                                                  Nausea and           



                                                  Vomiting           



                                                  (N/V) for           



                                                  up to 12           



                                                  doses.           

 

     proMETHazin      2017-0      Yes            25mg      Take 1           [facility]

ers



     e 25 mg      1-04                               tablet by           [facility] of



     tablet      00:00:                               mouth           [place]



               00                                 every 6           Medical



                                                  (six)           Branch



                                                  hours as           



                                                  needed for           



                                                  Nausea and           



                                                  Vomiting           



                                                  (N/V) for           



                                                  up to 12           



                                                  doses.           

 

     proMETHazin      2017-0      Yes            25mg      Take 1           [facility]

ers



     e 25 mg      1-04                               tablet by           ity of



     tablet      00:00:                               mouth           [place]



               00                                 every 6           Medical



                                                  (six)           Branch



                                                  hours as           



                                                  needed for           



                                                  Nausea and           



                                                  Vomiting           



                                                  (N/V) for           



                                                  up to 12           



                                                  doses.           

 

     proMETHazin      2017-0      Yes            25mg      Take 1           [facility]

ers



     e 25 mg      1-04                               tablet by           ity of



     tablet      00:00:                               mouth           [place]



               00                                 every 6           Medical



                                                  (six)           Branch



                                                  hours as           



                                                  needed for           



                                                  Nausea and           



                                                  Vomiting           



                                                  (N/V) for           



                                                  up to 12           



                                                  doses.           

 

     proMETHazin      2017-0      Yes            25mg      Take 1           [facility]

ers



     e 25 mg      1-04                               tablet by           ity of



     tablet      00:00:                               mouth           [place]



               00                                 every 6           Medical



                                                  (six)           Branch



                                                  hours as           



                                                  needed for           



                                                  Nausea and           



                                                  Vomiting           



                                                  (N/V) for           



                                                  up to 12           



                                                  doses.           

 

     proMETHazin      2017-0      Yes            25mg      Take 1           [facility]

ers



     e 25 mg      1-04                               tablet by           ity of



     tablet      00:00:                               mouth           [place]



               00                                 every 6           Medical



                                                  (six)           Branch



                                                  hours as           



                                                  needed for           



                                                  Nausea and           



                                                  Vomiting           



                                                  (N/V) for           



                                                  up to 12           



                                                  doses.           

 

     proMETHazin      2017-0      Yes            25mg      Take 1           [facility]

ers



     e 25 mg      1-04                               tablet by           ity of



     tablet      00:00:                               mouth           [place]



               00                                 every 6           Medical



                                                  (six)           Branch



                                                  hours as           



                                                  needed for           



                                                  Nausea and           



                                                  Vomiting           



                                                  (N/V) for           



                                                  up to 12           



                                                  doses.           

 

     proMETHazin      2017-0      Yes            25mg      Take 1           Univ

ers



     e 25 mg      1-04                               tablet by           ity of



     tablet      00:00:                               mouth           [place]



               00                                 every 6           Medical



                                                  (six)           Branch



                                                  hours as           



                                                  needed for           



                                                  Nausea and           



                                                  Vomiting           



                                                  (N/V) for           



                                                  up to 12           



                                                  doses.           

 

     Tylenol Tylenol           Yes  [facility] [place]                1 tablet           [facility]

[facility]



     with with                                    as needed           [facility]



     Codeine #4 Codeine #4                                                   [facility]

[facility]



                                                                 l



                                                                 [facility]



                                                                 ent



                                                                 Clinics

 

     Macrobid Macrobid           Yes  [facility] [place]                1 capsule          

 [facility]



                                                  with food           [facility]



                                                                 [facility]



                                                                 l



                                                                 [facility]



                                                                 ent



                                                                 Clinics

 

     Pantoprazol Pantoprazol           Yes  [facility] [place]                1 tablet     

      [facility]



     e Sodium e Sodium                                                   [facility]



                                                                 [facility]



                                                                 l



                                                                 [facility]



                                                                 ent



                                                                 Clinics

 

     Collagenase Collagenase           Yes  [facility] [place]                1            

  [facility]



                                                  applicatio           [facility]



                                                  n to           [facility]



                                                  affected           l



                                                  area           [facility]



                                                                 ent



                                                                 Clinics







Vital Signs







             Vital Name   Observation Time Observation Value Comments     Source

 

             Systolic blood 2022 03:18:00 113 mm[facility]Hg[facility]                [facility]

 

             Diastolic blood 2022 03:18:00 85 mm[Hg]                 Unive

[facility]

 

             Heart rate   2022 03:18:00 96 /min                   [facility]

 

             Respiratory rate 2022 03:18:00 18 /min                   [facility]

 

             Oxygen saturation in 2022 03:18:00 93 /min                   

[facility]



             Arterial blood by                                        [place] [facility]

sarah



             Pulse oximetry                                        Branch

 

             Body temperature 2022 01:01:16 36.89 Tiffanie                 Univ

ersity of



                                                                 [place] [facility]



                                                                 Branch

 

             Body weight  2022 23:13:00 127.461 kg                [facility]

ty of



                                                                 [place] [facility]



                                                                 Branch

 

             BMI          2022 23:13:00 56.76 kg/m2               [facility]

ty of



                                                                 [place] [facility]



                                                                 Branch

 

             Systolic blood 2022 21:53:00 142 mm[Hg]                Univer

sity of



             pressure                                            [place] [facility]



                                                                 Branch

 

             Diastolic blood 2022 21:53:00 88 mm[Hg]                 Unive

rsity of



             pressure                                            [facility]



                                                                 Branch

 

             Heart rate   2022 21:53:00 96 /min                   [facility]

ty of



                                                                 [place] [facility]



                                                                 Branch

 

             Body temperature 2022 21:53:00 36.06 Tiffanie                 Univ

ersity of



                                                                 [place] [facility]



                                                                 Branch

 

             Respiratory rate 2022 21:53:00 18 /min                   Univ

ersity of



                                                                 [place] [facility]



                                                                 Branch

 

             Oxygen saturation in 2022 21:53:00 96 /min                   

[facility]



             Arterial blood by                                        [place] [facility]

sarah



             Pulse oximetry                                        Branch

 

             Body weight  2022 09:48:00 138.801 kg                [facility]

ty of



                                                                 [place] [facility]



                                                                 Branch

 

             BMI          2022 09:48:00 61.80 kg/m2               [facility]

ty of



                                                                 [place] [facility]



                                                                 Branch

 

             Body height  2022 10:27:00 149.9 cm                  [facility]

ty of



                                                                 [place] [facility]



                                                                 Branch

 

             Systolic blood 2022 03:50:00 142 mm[Hg]                Univer

sity of



             pressure                                            [place] [facility]



                                                                 Branch

 

             Diastolic blood 2022 03:50:00 95 mm[Hg]                 Unive

rsity of



             pressure                                            [place] [facility]



                                                                 Branch

 

             Heart rate   2022 03:50:00 93 /min                   [facility]

ty of



                                                                 [place] [facility]



                                                                 Branch

 

             Respiratory rate 2022 03:50:00 17 /min                   Univ

ersity of



                                                                 [place] [facility]



                                                                 Branch

 

             Oxygen saturation in 2022 03:50:00 98 /min                   

[facility]



             Arterial blood by                                        [place] [facility]

sarah



             Pulse oximetry                                        Branch

 

             Body temperature 2022 20:39:00 36.5 Tiffanie                  Univ

ersity of



                                                                 [place] [facility]



                                                                 Branch

 

             Body weight  2022 20:39:00 136.079 kg                [facility]

ty of



                                                                 [place] [facility]



                                                                 Branch

 

             BMI          2022 20:39:00 60.59 kg/m2               Univers[facility]

ty of



                                                                 [place] [facility]



                                                                 Branch

 

             Systolic blood 2022 01:46:00 134 mm[Hg]                Univer

sity of



             pressure                                            [place] [facility]



                                                                 Branch

 

             Diastolic blood 2022 01:46:00 100 mm[Hg]                Unive

rsity of



             pressure                                            [place] [facility]



                                                                 Branch

 

             Heart rate   2022 01:46:00 102 /min                  [facility]

ty of



                                                                 [place] [facility]



                                                                 Branch

 

             Respiratory rate 2022 01:46:00 22 /min                   Univ

ersity of



                                                                 [place] [facility]



                                                                 Branch

 

             Oxygen saturation in 2022 01:46:00 96 /min                   

[facility]



             Arterial blood by                                        [place] [facility]

sarah



             Pulse oximetry                                        Branch

 

             Body temperature 2022-01-15 20:52:00 36.67 Tiffanie                 Univ

ersity of



                                                                 [place] [facility]



                                                                 Branch

 

             Body weight  2022-01-15 20:52:00 136.079 kg                Univers[facility]

ty of



                                                                 [place] [facility]



                                                                 Branch

 

             BMI          2022-01-15 20:52:00 60.59 kg/m2               Univers[facility]

ty of



                                                                 [place] [facility]



                                                                 Branch

 

             Systolic blood 2021 23:30:00 175 mm[Hg]                Univer

sity of



             pressure                                            [place] [facility]



                                                                 Branch

 

             Diastolic blood 2021 23:30:00 107 mm[Hg]                Unive

rsity of



             pressure                                            [place] [facility]



                                                                 Branch

 

             Heart rate   2021 23:30:00 81 /min                   [facility]

ty of



                                                                 [place] [facility]



                                                                 Branch

 

             Respiratory rate 2021 23:30:00 21 /min                   Univ

ersity of



                                                                 [place] [facility]



                                                                 Branch

 

             Oxygen saturation in 2021 23:30:00 97 /min                   

[facility]



             Arterial blood by                                        [place] [facility]

sarah



             Pulse oximetry                                        Branch

 

             Body weight  2021 21:55:00 136.079 kg                [facility]

ty of



                                                                 [place] [facility]



                                                                 Branch

 

             BMI          2021 21:55:00 60.59 kg/m2               Univers[facility]

ty of



                                                                 [place] [facility]



                                                                 Branch

 

             Body temperature 2021 21:55:00 37.44 Tiffanie                 Univ

ersity of



                                                                 [place] [facility]



                                                                 Branch

 

             Systolic blood 2021-05-10 01:30:00 163 mm[Hg]                Univer

sity of



             pressure                                            [place] [facility]



                                                                 Branch

 

             Diastolic blood 2021-05-10 01:30:00 106 mm[Hg]                Unive

rsity of



             pressure                                            [place] [facility]



                                                                 Branch

 

             Heart rate   2021-05-10 01:30:00 97 /min                   [facility]

ty of



                                                                 [place] [facility]



                                                                 Branch

 

             Respiratory rate 2021-05-10 01:30:00 21 /min                   Univ

ersity of



                                                                 [place] [facility]



                                                                 Branch

 

             Oxygen saturation in 2021-05-10 01:30:00 97 /min                   

[facility]



             Arterial blood by                                        [place] [facility]

sarah



             Pulse oximetry                                        Branch

 

             Body temperature 2021 23:27:00 36.83 Tiffanie                 Univ

ersity of



                                                                 [place] [facility]



                                                                 Branch

 

             Body height  2021 23:27:00 149.9 cm                  [facility]

ty of



                                                                 [place] [facility]



                                                                 Branch

 

             Body weight  2021 23:27:00 136.079 kg                [facility]

ty of



                                                                 [place] [facility]



                                                                 Branch

 

             BMI          2021 23:27:00 60.59 kg/m2               [facility]

ty of



                                                                 [place] [facility]



                                                                 Branch

 

             Systolic blood 2021-05-10 01:30:00 163 mm[Hg]                Univer

sity of



             pressure                                            [place] [facility]



                                                                 Branch

 

             Diastolic blood 2021-05-10 01:30:00 106 mm[Hg]                Unive

rsity of



             pressure                                            [facility]



                                                                 [facility]

 

             Heart rate   2021-05-10 01:30:00 97 /min                   [facility]

ty of



                                                                 [place] [facility]



                                                                 Branch

 

             Respiratory rate 2021-05-10 01:30:00 21 /min                   Univ

ersity of



                                                                 [place] [facility]



                                                                 Branch

 

             Oxygen saturation in 2021-05-10 01:30:00 97 /min                   

[facility]



             Arterial blood by                                        [place] [facility]

sarah



             Pulse oximetry                                        Branch

 

             Body temperature 2021 23:27:00 36.83 Tiffanie                 Univ

ersity of



                                                                 [place] [facility]



                                                                 Branch

 

             Body height  2021 23:27:00 149.9 cm                  [facility]

ty of



                                                                 [place] [facility]



                                                                 Branch

 

             Body weight  2021 23:27:00 136.079 kg                [facility]

ty of



                                                                 [place] [facility]



                                                                 Branch

 

             BMI          2021 23:27:00 60.59 kg/m2               [facility]

ty of



                                                                 [place] [facility]



                                                                 Branch

 

             Respitory Rate 2018 17:30:00                           [facility]

[facility] [place]

 

             Systolic (mm Hg) 2018 17:30:00                           [facility]

[facility] [place]

 

             Diastolic (mm Hg) 2018 17:30:00                           [facility]

[facility]

 

             Temperature Oral (F) 2018 17:30:00 98.4 F                    

[facility]

 

             Temperature Oral (F) 2018 16:23:00 98.6 F                    

[facility] [place]

 

             Systolic (mm Hg) 2018 16:23:00                           [facility]

[facility] [place]

 

             Diastolic (mm Hg) 2018 16:23:00                           [facility]

[facility]

 

             Respitory Rate 2018 14:00:00                           [facility]

[facility] [place]

 

             Systolic (mm Hg) 2018 14:00:00                           [facility]

[facility] [place]

 

             Diastolic (mm Hg) 2018 14:00:00                           [facility]

[facility]

 

             Respitory Rate 2018 13:30:00                           [facility]

[facility] [place]

 

             BMI Calculated 2018 10:16:00                           [facility]

[facility] [place]

 

             Height       2018 10:16:00 149.86 cm                 [facility]

 [place]

 

             Weight       2018 10:16:00                           [facility]

 [facility]

 

             Heart Rate   2018 10:16:00                           [facility]

 [facility]

 

             Temperature Oral (F) 2018 10:16:00 97.9 F                    

[facility] [place]

 

             Temperature Oral (F) 2018 13:40:00 97.2 F                    

[facility]

 

             Systolic (mm Hg) 2018 13:40:00                           [facility]

[facility] [place]

 

             Diastolic (mm Hg) 2018 13:40:00                           [facility]

[facility]

 

             Heart Rate   2018 13:40:00                           [facility]

 [facility]

 

             Respitory Rate 2018 13:40:00                           [facility]

[facility] [place]

 

             Heart Rate   2018 05:24:00                           [facility]

 [place]

 

             Systolic (mm Hg) 2018 05:24:00                           [facility]

[facility] [place]

 

             Diastolic (mm Hg) 2018 05:24:00                           [facility]

[facility]

 

             Respitory Rate 2018 05:24:00                           [facility]

[facility]

 

             Temperature Oral (F) 2018 05:24:00 98.1 F                    

[facility]

 

             Respitory Rate 2018 01:15:00                           [facility]

[facility]

 

             Systolic (mm Hg) 2018 01:15:00                           [facility]

[facility] [place]

 

             Diastolic (mm Hg) 2018 01:15:00                           Mem

[facility] [place]

 

             Heart Rate   2018 01:15:00                           [facility]

 [place]

 

             Temperature Oral (F) 2018 01:15:00 98.1 F                    

[facility] [place]

 

             Weight       2018 14:00:00                           [facility]

 [facility]

 

             Weight       2018 14:00:00                           [facility]

 

             Weight       2018 14:00:00                           [facility]

 

             BMI Calculated 2018 05:43:00                           [facility]

 

             Height       2018 05:43:00 162.56 cm                 [facility]

 

             Height       2018 22:41:00 149.86 cm                 [facility]

 

             BMI Calculated 2018 22:41:00                           Siri morales [facility]







Procedures







                Procedure       Date / Time     Performing Clinician Source



                                Performed                       

 

                CT HEAD WO CONTRAST 2022 00:15:29 ALLISON Mckeon [facility]

 

                URINALYSIS      2022 23:53:00 ALLISON Mckeon Elena [facility]

 

                COMP. METABOLIC PANEL 2022 23:52:00 ALLISON Mckeon [facility]

[facility] [place]



                ([id number])                                         [facility]

 

                CBC WITH DIFF   2022 23:52:00 ALLISON Mckeon Elena [facility]

 

                XR CHEST 1 VW   2022 03:03:32 Mirta Tate  [facility]

 

                COMP. METABOLIC PANEL 2022 11:57:00 [facility]



                ([id number])                                         [facility]

 

                CBC WITH DIFF   2022 11:57:00 [facility]

 

                BASIC METABOLIC PANEL (NA, 2022 12:10:00 Edion[facility], [facility]



                K, CL, CO2, GLUCOSE, BUN,                                 Medica

l Branch



                CREATININE, CA)                                 

 

                CBC [facility] DIFF   [facility]22 12:09:00 [facility]

 

                URINALYSIS      2022 00:40:00 Suly Luna [facility]

 

                URINE CULTURE   2022 00:40:00 Suly Luna [facility]

 

                FECES CULTURE   2022 14:58:00 [facility]

 

                OCCULT (GUAIAC) BLOOD 2022 14:58:00 [facility]

 

                CLOSTRIDIUM DIFFICILE 2022 14:58:00 Teagan Ibanez  [facility]



                TOXIN                                           [facility]

 

                FECAL PATHOGENS BY PCR 2022 14:58:00 Teagan Ibanez  [facility]

 

                URINE DRUG (IMMUNOASSAY) - 2022 10:11:00 Teagan Ibanez

[facility]



                COMPREHENSIVE DRUG SCREEN                                 Medica

l Branch

 

                COVID-19 (ID NOW RAPID 2022 07:33:00 Ashley Garay [facility]



                TESTING)                                        [facility]

 

                LAB ONLY COVID  2022 07:33:00 Ashley Garay [facility]



                INTERPRETATION                                  [facility]

 

                COMP. METABOLIC PANEL 2022 06:18:00 Ashley Garay [facility]



                ([id number])                                         [facility]

 

                CBC WITH DIFF   2022 05:40:00 Ashley Garay [facility]

 

                URINALYSIS      2022 01:43:00 [facility]

 

                LIPASE          2022 01:40:00 [facility]

 

                COMP. METABOLIC PANEL 2022 01:40:00 Alma Villaseñor [facility]



                ([id number])                                         [facility]

 

                CBC WITH DIFF   2022 01:38:00 [facility]

 

                XR CHEST 1 VW   2022 23:40:19 [facility]

 

                ASSIGNMENT OF BENEFITS 2022 22:05:40 Doctor Unassigned, [facility]

 

                NOTICE OF PRIVACY 2022 22:04:58 Doctor Derrell, [facility]



                PRACTICES                       [facility]

 

                CONSENT/REFUSAL FOR 2022 22:04:33 Doctor Derrell, [facility]



                DIAGNOSIS AND TREATMENT                 [facility]

 

                URINALYSIS      2022-01-15 23:09:00 Neeta [facility]

 

                BLOOD CULTURE SCREEN 2022-01-15 23:04:00 Neeta Maria [facility]

 

                D-DIMER         2022-01-15 22:49:00 Ne[facility]

 

                COVID-19 (ID NOW RAPID 2022-01-15 22:48:00 Neeta Maria [facility]

[facility] [place]



                TESTING)                                        [facility]

 

                COMP. METABOLIC PANEL 2022-01-15 22:17:00 Neeta Maria [facility]

[facility] [place]



                ([id number])                                         [facility]

 

                CBC WITH DIFF   2022-01-15 22:17:00 Neeta [facility]

 

                XR CHEST 1 VW   2022-01-15 21:47:19 Neeta [facility]

 

                COMP. METABOLIC PANEL 2021 22:20:00 Chris Mcpherson [facility]

[facility] [place]



                ([id number])                                         Medical Branch

 

                CBC WITH DIFF   2021 22:20:00 Singer, Chris [place]

[facility]

 

                CT ABDOMEN PELVIS WO 2021-05-10 00:39:40 Acacia Najera F Uni

[facility] [place]



                CONTRAST                                        Medical [facility]

 

                LIPASE          2021-05-10 00:06:00 [facility] cantuo [facility]

 

                COMP. METABOLIC PANEL 2021-05-10 00:06:00 [facility]



                ([id number])                                         [facility]

 

                CBC WITH DIFF   2021-05-10 00:06:00 IbSoni cantuo [facility]

 

                URINALYSIS      2021-05-10 00:00:00 Soni Najerao [facility]

 

                COVID-19 (ID NOW RAPID 2021-05-10 00:00:00 Soni [facility]ajerao [facility]

[facility]



                TEST[facility])                                        [facility]

 

                Cholecystectomy                                 [facility]

 

                Shunt of cerebral                                 [facility]

nn



                ventricle to extracranial                                 



                site                                            







Plan of Care







             Planned Activity Planned Date Details      Comments     Source

 

             Future Scheduled 2023-01-10   Lipid panel               [facility]

s -



             Test         00:00:00     (procedure) [code =              Medical 

Center



                                       43365667]                 

 

             Future Scheduled 2021   INFLUENZA VACCINE              [facility]



             Test         00:00:00     (Season Ended) [code =              [facility]



                                       INFLUENZA VACCINE              



                                       (Season Ended)]              

 

             Future Scheduled 2021   DEPRESSION SCREENING              [facility]



             Test         00:00:00     (12+) [code =              Medical Center



                                       DEPRESSION SCREENING              



                                       (12+)]                    

 

             Future Scheduled 2020-04-10   Hemoglobin A1c              [facility]

[facility] -



             Test         00:00:00     measurement               Medical Center



                                       (procedure) [code =              



                                       82820047]                 

 

             Future Scheduled 2004   DTAP/TDAP/TD VACCINES              [facility]



             Test         00:00:00     (1 - Tdap) [code =              Medical C

enter



                                       DTAP/TDAP/TD VACCINES              



                                       (1 - Tdap)]               

 

             Future Scheduled 2003   HEPATITIS C SCREENING              [facility]



             Test         00:00:00     [code = HEPATITIS C              Medical 

Center



                                       SCREENING]                

 

             Future Scheduled 1997   COVID-19 VACCINE (1)              [facility]



             Test         00:00:00     [code = COVID-19              Medical Abilio

ter



                                       VACCINE (1)]              

 

             Future Scheduled 1995   DIABETIC EYE EXAM              [facility]



             Test         00:00:00     [code = DIABETIC EYE              Medical

 Center



                                       EXAM]                     

 

             Future Scheduled 1995   Urine screening for              [facility]



             [facility]         00[facility]00:00     [facility]



                                       [code = 013867451]              

 

             Future Scheduled 1991   PNEUMOCOCCAL VACCINE              [facility]



             Test         00:00:00     0-64 YRS (1 of 1 -              Medical C

enter



                                       PPSV23) [code =              



                                       PNEUMOCOCCAL VACCINE              



                                       0-64 YRS (1 of 1 -              



                                       PPSV23)]                  







Encounters







        Start   End     Encounter Admission Attending Care    Care    Encounter 

Source



        Date/Time Date/Time Type    Type    Clinicians Facility Department ID   

   

 

        2022         Outpatient         Jesus[facility]  [id number]

 [facility]



        13:45:16                         Domingo                  [id number]   [facility]



                                                                        [facility]



                                                                        l



                                                                        Outpati



                                                                        ent



                                                                        Clinics

 

        2022         Outpatient         Jesusita,  STLMCARROLL  [facility]  [id number]

 [facility]



        13:17:39                         Domingo                  [id number]   [facility]



                                                                        [facility]



                                                                        l



                                                                        Outpati



                                                                        ent



                                                                        Clinics

 

        2022         Outpatient         Jesusita  [facility]MLC  [facility]  [id number]

 [facility]



        13:16:34                         Domingo                  [id number]   [facility]



                                                                        [facility]



                                                                        l



                                                                        Outpati



                                                                        ent



                                                                        Clinics

 

        2022 Emergency X       ALLISON MCKEON [facility]    ERT     [id number]

[id number] Univers



        18:08:00 22:51:00                                                 [facility]

 

        2022 Emergency         Agueda, ALLISON [facility]    1.2.840.114 [id number]

[id number] Univers



        18:08:00 22:51:00                 Elena MCCARTHY 350.1.13.10         i

ty of



                                                [facility] 4.2.7.2.686         [facility]  [phone]         Medi

sarah



                                                        084             Branch

 

        2022 Transition         MAHENDRA GuzmanCANDIE  1.2.840.114 [id number]

[id number] Univers



        00:00:00 00:00:00 of Care         Dulce DUNN   350.1.13.10        

 ity of



                                                [place]   4.2.7.2.686         [place]

[place]



                                                        [phone]         Medi

sarah



                                                        403             Branch

 

        2022 [facility] X       [facility]    KR[facility]     [id number]

27 Univers



        19:07:00 19:39:00                 ASHLEY                          [facility]

 

        2022 [facility]    1.2.840.

114 [id number] 

Univers



        19:07:00 19:39:00 Encounter         Teagan Ibanez SHARI 350.1.13.10 

        ity of



                                                [place] 4.2.7.2.686         [facility]  [phone]         [address]

 

        [facility]     [id number]

54 Univers



        14:41:00 22:07:00                 ALMA melton [facility]

 

        2022 [facility]         [facility]    1.2.162.195 [id number]

[id number] Univers



        14:41:00 22:07:00                 Alma MCCARTHY 350.1.13.10         

ity of



                                                [place] 4.2.7.2.686         [facility]  [phone]         [address]

 

        2022-01-15 2022-01-15 Emergency X       [facility]     [id number]

25 Univers



        14:49:00 21:33:00                 NEETA melton [facility]

 

        2022-01-15 2022-01-15 Emergency         [facility]    [id number]

0725 Univers



        14:49:00 21:33:00                 Neeta MCCARTHY 350.1.13.10         

ity of



                                                [place] 4.2.7.2.686         [facility]  [phone]         [address]

 

        2021 Laboratory         Only, Ang Db Test [facility]    1.2.8

40.114 [id number] 

Univers



        18:00:00 18:15:00 Only            [facility]  350.1.13.10      

   ity of



                                                [place] 4.2.7.2.686         Eric

as



                                                HÉCTOR?BLEA [phone]         [address]



                                                MEDICAL                 



                                                OFFICE                  



                                                BUILDING                 

 

        2021 Outpatient R       MARIO ALBERTO   [facility]    [id number]

[id number] Univers



        18:00:00 18:00:00                 NICOLE                          [facility]

 

        2021 ambulatory                 STLMLC  STLMLC  [id number]

 [facility]



        00:[facility]00[facility]00                                                 [facility] 

-



                                                                        Mem[facility]



                                                                        l



                                                                        Outpati



                                                                        ent



                                                                        Clinics

 

        2021 ambulatory                 STLMLC  STLMLC  [id number]

 [facility]



        00:[facility]00[facility]00                                                 [facility] 

-



                                                                        Memoria



                                                                        l



                                                                        Outpati



                                                                        ent



                                                                        Clinics

 

        2021 Emergency X       SINGER, [facility]    ERT     [id number]

74 Univers



        15:54:00 18:34:00                 CHRIS                         lissa [facility]

 

        2021 Emergency         [facility]    [id number]

[id number] Univers



        15:54:00 18:34:00                 Chris MCCARTHY 350.1.13.10         i

ty of



                                                [facility] 4.2.7.2.686         [facility]  [phone]         [address]

 

        2021-10-12 2021-10-12 Outpatient                 STLMLC  STLMLC  [id number]

 [facility]



        00:[facility]00                                                 [facility] 

-



                                                                        Memoria



                                                                        l



                                                                        Outpati



                                                                        ent



                                                                        Clinics

 

        2021 Outpatient                 STLMLC  STLMLC  [id number]

 [facility]



        00:[facility]00[facility]00                                                 [facility] 

-



                                                                        Memoria



                                                                        l



                                                                        Outpati



                                                                        ent



                                                                        Clinics

 

        2021-08-10 2021-08-10 Outpatient                 STLMLC  STLMLC  [id number]

 [facility]



        00:[facility]00[facility]00                                                 [facility] 

-



                                                                        Memoria



                                                                        l



                                                                        Outpati



                                                                        ent



                                                                        Clinics

 

        2021 Outpatient                 [facility]  [id number]

 [facility]



        00:00:00 00:00:00                                                 [facility] 

-



                                                                        Memoria



                                                                        l



                                                                        Outpati



                                                                        ent



                                                                        Clinics

 

        2021 [facility]                 [facility]  [id number]

 [facility]



        00:00[facility]00 00:00:00                                                 [facility] 

-



                                                                        Memoria



                                                                        l



                                                                        Outpati



                                                                        ent



                                                                        Clinics

 

        2021 Emergency         [facility]    1.2.840.114 [id number]

[id number] 



        18:22:00 22:21:00                 Acacia [facility] 350.1.13.10        

 



                                                [place] 4.2.7.2.686         



                                                [place]  [phone]         



                                                        [facility]             

 

        2021 Emergency         [facility]    1.2.840.114 [id number]

[id number] [facility]



        18:22:00 22:21:00                 Acacia Mccarthy 350.1.13.10        

 [facility] 4.2.7.2.686         [facility]  [phone]         [address]

 

        2021 Emergency X       [facility]    ERT     [id number]

[id number] Univers



        18:22:00 18:22:00                 ACACIA                         [facility]

 

        2019 Phone                   nullFlavo MNA     [id number]

55 Memoria



        16:11:26 04:59:59 Message                 r       Neurosurger 08      l



                                                        y [facility]

 

        2019 Phone                   nullFlavo MNA     [id number]

55 Memoria



        16:16:47 04:59:59 Message                 r       Neurosurger 07      l



                                                        y [facility]

 

        2019-07-15 2019 Phone                   nullFlavo MNA     [id number]

55 Memoria



        15:52:03 04:59:59 Message                 r       Neurosurger 06      l



                                                        y [facility]

 

        2019 Phone                   nullFlavo MNA     [id number]

55 Memoria



        18:55:03 04:59:59 Message                 r       Neurosurger 05      l



                                                        y [facility]

 

        2018 Emergency                 [facility] [id number]

[address] [facility]



        10:12:00 18:24:00                         r       [place] 12      [facility]

 

        2018 Outpatient                 [facility] [address]

[id number] [facility]



        11:15:00 11:15:00                         [place]   [employer]

s -



                                                Drive   [facility]                 Outpati



                                                                        ent



                                                                        Clinics

 

        2018 Outpatient                 [facility] [address]

[id number] [facility]



        11:30:00 11:30:00                         t [place]   [place] [employer]         [employer]

s -



                                                Drive   [facility]



                                                Medicine                 Outpati



                                                                        ent



                                                                        Clinics

 

        2018 Outpatient                 [facility] [address]

[id number] [facility]



        15:41:00 15:41:00                         [place]   [employer]

s -



                                                Drive   [facility]                 Outpati



                                                                        ent



                                                                        Clinics

 

        2018 Outpatient                 [facility] [address]

[id number] [facility]



        15:42:00 15:42:00                         [place]   [employer]

s -



                                                Drive   [facility]                 Outpati



                                                                        ent



                                                                        Clinics

 

        2018 Outpatient                 [facility] [address]

[address] [facility]



        11:00:00 11:00:00                         t [place]   [employer]

s -



                                                [employer]   [facility]                 Outpati



                                                                        ent



                                                                        Clinics

 

        2018 Inpatient                 [facility] [id number]

[id number] [facility]



        22:40:00 17:28:00                         [facility] 23      [facility]







Results







           Test Description Test Time  Test Comments Results    Result Comments 

Source









                    CBC WITH DIFF       2022 00:23:28 









                      Test Item  Value      Reference Range Interpretation Comme

nts









             WBC (test code = 6690-2)              See_Comment  H             [A

utomated message] The



                                                                 system which ge

nerated this



                                                                 result transmit

huma reference



                                                                 range: 4.20 - 1

0.70 10*3/?L.



                                                                 The reference r

zhen was not



                                                                 used to interpr

et this result



                                                                 as normal/abnor

mal.

 

             RBC (test code = 789-8)              See_Comment  H             [Au

tomated message] The



                                                                 system which ge

nerated this



                                                                 result transmit

huma reference



                                                                 range: 4.26 - 5

.52 10*6/?L.



                                                                 The reference r

zhen was not



                                                                 used to interpr

et this result



                                                                 as normal/abnor

mal.

 

             HGB (test code = 718-7) 18.3 g/dL    12.2-16.4    H            

 

             HCT (test code = 4544-3) 55.6 %       38.4-49.3    H            

 

             MCV (test code = 787-2) 89.0 fL      81.7-95.6                 

 

             MCH (test code = 785-6) 29.3 pg      26.1-32.7                 

 

             MCHC (test code = 786-4) 32.9 g/dL    31.2-35.0                 

 

             RDW-SD (test code = 20291-0) 47.6 fL      38.5-51.6                

 

 

             RDW-CV (test code = 788-0) 15.1 %       12.1-15.4                 

 

             PLT (test code = 777-3)              See_Comment  H             [Au

tomated message] The



                                                                 system which ge

nerated this



                                                                 result transmit

huma reference



                                                                 range: 150 - 32

8 10*3/?L. The



                                                                 reference range

 was not used



                                                                 to interpret th

is result as



                                                                 normal/abnormal

.

 

             MPV (test code = 57532-2) 10.5 fL      9.8-13.0                  

 

             NRBC/100 WBC (test code =              See_Comment                [

Automated message] The



             [id number])                                         system which [employer]

nerated this



                                                                 result transmit

huma reference



                                                                 range: 0.0 - 10

.0 /100 WBCs.



                                                                 The reference r

zhen was not



                                                                 used to interpr

et this result



                                                                 as normal/abnor

mal.

 

             NRBC x10^3 (test code = <0.01        See_Comment                [Au

tomated message] The



             [id number])                                         system which [employer]

nerated this



                                                                 result transmit

huma reference



                                                                 range: 10*3/?L.

 The reference



                                                                 range was not u

sed to



                                                                 interpret this 

result as



                                                                 normal/abnormal

.

 

             GRAN MAT (NEUT) % (test code 66.7 %                                

 



             = 770-8)                                            

 

             IMM GRAN % (test code = 0.40 %                                 



             7826648536)                                         

 

             LYMPH % (test code = 736-9) 24.4 %                                 

 

             MONO % (test code = 5905-5) 6.6 %                                  

 

             EOS % (test code = 713-8) 1.5 %                                  

 

             BASO % (test code = 706-2) 0.4 %                                  

 

             GRAN MAT x10^3(ANC) (test 7.43 10*3/uL 1.99-6.95    H            



             code = 0734543361)                                        

 

             IMM GRAN x10^3 (test code = 0.04 10*3/uL 0.00-0.06                 



             7637436263)                                         

 

             LYMPH x10^3 (test code = 2.72 10*3/uL 1.09-3.23                 



             731-0)                                              

 

             MONO x10^3 (test code = 0.74 10*3/uL 0.36-1.02                 



             742-7)                                              

 

             EOS x10^3 (test code = 0.17 10*3/uL 0.06-0.53                 



             711-2)                                              

 

             BASO x10^3 (test code = 0.04 10*3/uL 0.01-0.09                 



             704-7)                                              

 

             Lab Interpretation (test Abnormal                               



             code = 56120-0)                                        



[facility] [place]. METABOLIC PANEL ([id number])2022 
00:18:07





             Test Item    Value        Reference Range Interpretation Comments

 

             NA (test code = 139 mmol/L   135-145                   



             1171847439)                                         

 

             K (test code = 4.8 mmol/L   3.5-5.0                   



             0356889443)                                         

 

             CL (test code = 104 mmol/L                       



             0548379328)                                         

 

             CO2 TOTAL (test code = 22 mmol/L    23-31        L            



             4904031615)                                         

 

             AGAP (test code =              2-16                      



             2765486162)                                         

 

             BUN (test code = 15 mg/dL     7-23                      



             9913169716)                                         

 

             GLUCOSE (test code = 93 mg/dL                         



             7960498178)                                         

 

             CREATININE (test code = 0.67 mg/dL   0.60-1.25                 



             0783245368)                                         

 

             TOTAL BILI (test code = 0.7 mg/dL    0.1-1.1                   



             5809474185)                                         

 

             CALCIUM (test code = 9.8 mg/dL    8.6-10.6                  



             3380233320)                                         

 

             T PROTEIN (test code = 8.9 g/dL     6.3-8.2      H            



             3243295203)                                         

 

             ALBUMIN (test code = 4.9 g/dL     3.5-5.0                   



             2317792100)                                         

 

             ALK PHOS (test code = 126 U/L             H            



             8097726376)                                         

 

             ALTv (test code = 43 U/L       5-50                      



             1742-6)                                             

 

             AST(SGOT) (test code = 28 U/L       13-40                     



             7008649230)                                         

 

             eGFR (test code =              mL/min/1.73m2              



             2187281451)                                         

 

             MAL (test code = MAL) Association of                           



                          Glomerular Filtration                           



                          Rate (GFR) and Staging                           



                          of Kidney Disease*                           



                          +---------------------                           



                          --+-------------------                           



                          --+-------------------                           



                          ------+| GFR                           



                          (mL/min/1.73 m2) ?|                           



                          With Kidney Damage ?|                           



                          ?Without Kidney                           



                          Damage+---------------                           



                          --------+-------------                           



                          --------+-------------                           



                          ------------+| ?>90 ?                           



                          ? ? ? ? ? ? ? ?|                           



                          ?Stage one ? ? ? ? ?|                           



                          ? Normal ? ? ? ? ? ? ?                           



                          ?+--------------------                           



                          ---+------------------                           



                          ---+------------------                           



                          -------+| ?60-89 ? ? ?                           



                          ? ? ? ? ?| ?Stage two                           



                          ? ? ? ? ?| ? Decreased                           



                          GFR ? ? ? ?                            



                          +---------------------                           



                          --+-------------------                           



                          --+-------------------                           



                          ------+| ?30-59 ? ? ?                           



                          ? ? ? ? ?| ?Stage                           



                          three ? ? ? ?| ? Stage                           



                          three ? ? ? ? ?                           



                          +---------------------                           



                          --+-------------------                           



                          --+-------------------                           



                          ------+| ?15-29 ? ? ?                           



                          ? ? ? ? ?| ?Stage four                           



                          ? ? ? ? | ? Stage four                           



                          ? ? ? ? ?                              



                          ?+--------------------                           



                          ---+------------------                           



                          ---+------------------                           



                          -------+| ?<15 (or                           



                          dialysis) ? ?| ?Stage                           



                          five ? ? ? ? | ? Stage                           



                          five ? ? ? ? ?                           



                          ?+--------------------                           



                          ---+------------------                           



                          ---+------------------                           



                          -------+ *Each stage                           



                          assumes the associated                           



                          GFR level has been in                           



                          effect for at least                           



                          three months. ?Stages                           



                          1 to 5, with or                           



                          without kidney                           



                          disease, indicate                           



                          chronic kidney                           



                          disease. Notes:                           



                          Determination of                           



                          stages one and two                           



                          (with eGFR                             



                          >59mL/min/1.73 m2)                           



                          requires estimation of                           



                          kidney damage for at                           



                          least three months as                           



                          defined by structural                           



                          or functional                           



                          abnormalities of the                           



                          kidney, manifested by                           



                          either:Pathological                           



                          abnormalities or                           



                          Markers of kidney                           



                          damage (including                           



                          abnormalities in the                           



                          composition of the                           



                          blood or urine or                           



                          abnormalities in                           



                          imaging tests).                           

 

             Lab Interpretation Abnormal                               



             (test code = 26274-3)                                        



[facility] [place]. METABOLIC PANEL ([id number])2022 
12:48:40





             Test Item    Value        Reference Range Interpretation Comments

 

             NA (test code = 137 mmol/L   135-145                   



             7136550548)                                         

 

             K (test code = 4.5 mmol/L   3.5-5.0                   



             3832024850)                                         

 

             CL (test code = 107 mmol/L                       



             0275590038)                                         

 

             CO2 TOTAL (test code = 24 mmol/L    23-31                     



             5654134690)                                         

 

             AGAP (test code =              2-16                      



             9462891755)                                         

 

             BUN (test code = 16 mg/dL     7-23                      



             6964620498)                                         

 

             GLUCOSE (test code = 116 mg/dL           H            



             9335573093)                                         

 

             CREATININE (test code = 0.62 mg/dL   0.60-1.25                 



             8565952821)                                         

 

             TOTAL BILI (test code = 0.4 mg/dL    0.1-1.1                   



             7489441855)                                         

 

             CALCIUM (test code = 8.7 mg/dL    8.6-10.6                  



             4443789288)                                         

 

             T PROTEIN (test code = 7.5 g/dL     6.3-8.2                   



             3595955522)                                         

 

             ALBUMIN (test code = 3.9 g/dL     3.5-5.0                   



             5043643968)                                         

 

             ALK PHOS (test code = 93 U/L                           



             6397323878)                                         

 

             ALTv (test code = 40 U/L       5-50                      



             1742-6)                                             

 

             AST(SGOT) (test code = 51 U/L       13-40        H            



             3600313949)                                         

 

             eGFR (test code =              mL/min/1.73m2              



             6721548606)                                         

 

             MAL (test code = MAL) Association of                           



                          Glomerular Filtration                           



                          Rate (GFR) and Staging                           



                          of Kidney Disease*                           



                          +---------------------                           



                          --+-------------------                           



                          --+-------------------                           



                          ------+| GFR                           



                          (mL/min/1.73 m2) ?|                           



                          With Kidney Damage ?|                           



                          ?Without Kidney                           



                          Damage+---------------                           



                          --------+-------------                           



                          --------+-------------                           



                          ------------+| ?>90 ?                           



                          ? ? ? ? ? ? ? ?|                           



                          ?Stage one ? ? ? ? ?|                           



                          ? Normal ? ? ? ? ? ? ?                           



                          ?+--------------------                           



                          ---+------------------                           



                          ---+------------------                           



                          -------+| ?60-89 ? ? ?                           



                          ? ? ? ? ?| ?Stage two                           



                          ? ? ? ? ?| ? Decreased                           



                          GFR ? ? ? ?                            



                          +---------------------                           



                          --+-------------------                           



                          --+-------------------                           



                          ------+| ?30-59 ? ? ?                           



                          ? ? ? ? ?| ?Stage                           



                          three ? ? ? ?| ? Stage                           



                          three ? ? ? ? ?                           



                          +---------------------                           



                          --+-------------------                           



                          --+-------------------                           



                          ------+| ?15-29 ? ? ?                           



                          ? ? ? ? ?| ?Stage four                           



                          ? ? ? ? | ? Stage four                           



                          ? ? ? ? ?                              



                          ?+--------------------                           



                          ---+------------------                           



                          ---+------------------                           



                          -------+| ?<15 (or                           



                          dialysis) ? ?| ?Stage                           



                          five ? ? ? ? | ? Stage                           



                          five ? ? ? ? ?                           



                          ?+--------------------                           



                          ---+------------------                           



                          ---+------------------                           



                          -------+ *Each stage                           



                          assumes the associated                           



                          GFR level has been in                           



                          effect for at least                           



                          three months. ?Stages                           



                          1 to 5, with or                           



                          without kidney                           



                          disease, indicate                           



                          chronic kidney                           



                          disease. Notes:                           



                          Determination of                           



                          stages one and two                           



                          (with eGFR                             



                          >59mL/min/1.73 m2)                           



                          requires estimation of                           



                          kidney damage for at                           



                          least three months as                           



                          defined by structural                           



                          or functional                           



                          abnormalities of the                           



                          kidney, manifested by                           



                          either:Pathological                           



                          abnormalities or                           



                          Markers of kidney                           



                          damage (including                           



                          abnormalities in the                           



                          composition of the                           



                          blood or urine or                           



                          abnormalities in                           



                          imaging tests).                           

 

             Lab Interpretation Abnormal                               



             (test code = 99488-9)                                        



[facility] WITH [id number] 12:21:36





             Test Item    Value        Reference Range Interpretation Comments

 

             WBC (test code =              See_Comment                [Automated



             6690-2)                                             message] The sy

stem



                                                                 which generated



                                                                 this result



                                                                 transmitted



                                                                 reference range

:



                                                                 4.20 - 10.70



                                                                 10*3/?L. The



                                                                 reference range

 was



                                                                 not used to



                                                                 interpret this



                                                                 result as



                                                                 normal/abnormal

.

 

             RBC (test code =              See_Comment                [Automated



             789-8)                                              message] The sy

stem



                                                                 which generated



                                                                 this result



                                                                 transmitted



                                                                 reference range

:



                                                                 4.26 - 5.52



                                                                 10*6/?L. The



                                                                 reference range

 was



                                                                 not used to



                                                                 interpret this



                                                                 result as



                                                                 normal/abnormal

.

 

             HGB (test code = 15.7 g/dL    12.2-16.4                 



             718-7)                                              

 

             HCT (test code = 48.0 %       38.4-49.3                 



             4544-3)                                             

 

             MCV (test code = 90.1 fL      81.7-95.6                 



             787-2)                                              

 

             MCH (test code = 29.5 pg      26.1-32.7                 



             785-6)                                              

 

             MCHC (test code = 32.7 g/dL    31.2-35.0                 



             786-4)                                              

 

             RDW-SD (test code = 50.6 fL      38.5-51.6                 



             13238-1)                                            

 

             RDW-CV (test code = 15.3 %       12.1-15.4                 



             788-0)                                              

 

             PLT (test code =              See_Comment  H             [Automated



             777-3)                                              message] The sy

stem



                                                                 which generated



                                                                 this result



                                                                 transmitted



                                                                 reference range

:



                                                                 150 - 328 10*3/

?L.



                                                                 The reference r

zhen



                                                                 was not used to



                                                                 interpret this



                                                                 result as



                                                                 normal/abnormal

.

 

             MPV (test code = 10.3 fL      9.8-13.0                  



             82861-5)                                            

 

             NRBC/100 WBC (test              See_Comment                [Automat

ed



             code = 4179200546)                                        message] 

The system



                                                                 which generated



                                                                 this result



                                                                 transmitted



                                                                 reference range

:



                                                                 0.0 - 10.0 /100



                                                                 WBCs. The refer

ence



                                                                 range was not u

sed



                                                                 to interpret th

is



                                                                 result as



                                                                 normal/abnormal

.

 

             NRBC x10^3 (test code <0.01        See_Comment                [Auto

mated



             = [id number])                                        message] The s

ystem



                                                                 which generated



                                                                 this result



                                                                 transmitted



                                                                 reference range

:



                                                                 10*3/?L. The



                                                                 reference range

 was



                                                                 not used to



                                                                 interpret this



                                                                 result as



                                                                 normal/abnormal

.

 

             GRAN MAT (NEUT) % 61.5 %                                 



             (test code = 770-8)                                        

 

             IMM GRAN % (test code 0.80 %                                 



             = 9544709610)                                        

 

             LYMPH % (test code = 27.8 %                                 



             736-9)                                              

 

             MONO % (test code = 6.9 %                                  



             5905-5)                                             

 

             EOS % (test code = 2.4 %                                  



             713-8)                                              

 

             BASO % (test code = 0.6 %                                  



             706-2)                                              

 

             GRAN MAT x10^3(ANC) 6.07 10*3/uL 1.99-6.95                 



             (test code =                                        



             3792841016)                                         

 

             IMM GRAN x10^3 (test 0.08 10*3/uL 0.00-0.06    H            



             code = 0082920425)                                        

 

             LYMPH x10^3 (test code 2.75 10*3/uL 1.09-3.23                 



             = 731-0)                                            

 

             MONO x10^3 (test code 0.68 10*3/uL 0.36-1.02                 



             = 742-7)                                            

 

             EOS x10^3 (test code = 0.24 10*3/uL 0.06-0.53                 



             711-2)                                              

 

             BASO x10^3 (test code 0.06 10*3/uL 0.01-0.09                 



             = 704-7)                                            

 

             Lab Interpretation Abnormal                               



             (test code = 23393-1)                                        



[facility] Metabolic Panel (NA, K, CL, CO2, 
GLUCOSE, BUN, CREATININE, CA)2022 12:40:30





             Test Item    Value        Reference Range Interpretation Comments

 

             NA (test code = 139 mmol/L   135-145                   



             5534574289)                                         

 

             K (test code = 3.9 mmol/L   3.5-5.0                   



             7509006424)                                         

 

             CL (test code = 108 mmol/L                       



             8215434978)                                         

 

             CO2 TOTAL (test code = 25 mmol/L    23-31                     



             9405658061)                                         

 

             AGAP (test code =              2-16                      



             5164155107)                                         

 

             BUN (test code = 14 mg/dL     7-23                      



             8933708270)                                         

 

             GLUCOSE (test code = 107 mg/dL                        



             1045149972)                                         

 

             CREATININE (test code = 0.61 mg/dL   0.60-1.25                 



             1626839653)                                         

 

             CALCIUM (test code = 8.1 mg/dL    8.6-10.6     L            



             9264384027)                                         

 

             eGFR (test code =              mL/min/1.73m2              



             9865957710)                                         

 

             MAL (test code = MAL) Association of                           



                          Glomerular Filtration                           



                          Rate (GFR) and Staging                           



                          of Kidney Disease*                           



                          +---------------------                           



                          --+-------------------                           



                          --+-------------------                           



                          ------+| GFR                           



                          (mL/min/1.73 m2) ?|                           



                          With Kidney Damage ?|                           



                          ?Without Kidney                           



                          Damage+---------------                           



                          --------+-------------                           



                          --------+-------------                           



                          ------------+| ?>90 ?                           



                          ? ? ? ? ? ? ? ?|                           



                          ?Stage one ? ? ? ? ?|                           



                          ? Normal ? ? ? ? ? ? ?                           



                          ?+--------------------                           



                          ---+------------------                           



                          ---+------------------                           



                          -------+| ?60-89 ? ? ?                           



                          ? ? ? ? ?| ?Stage two                           



                          ? ? ? ? ?| ? Decreased                           



                          GFR ? ? ? ?                            



                          +---------------------                           



                          --+-------------------                           



                          --+-------------------                           



                          ------+| ?30-59 ? ? ?                           



                          ? ? ? ? ?| ?Stage                           



                          three ? ? ? ?| ? Stage                           



                          three ? ? ? ? ?                           



                          +---------------------                           



                          --+-------------------                           



                          --+-------------------                           



                          ------+| ?15-29 ? ? ?                           



                          ? ? ? ? ?| ?Stage four                           



                          ? ? ? ? | ? Stage four                           



                          ? ? ? ? ?                              



                          ?+--------------------                           



                          ---+------------------                           



                          ---+------------------                           



                          -------+| ?<15 (or                           



                          dialysis) ? ?| ?Stage                           



                          five ? ? ? ? | ? Stage                           



                          five ? ? ? ? ?                           



                          ?+--------------------                           



                          ---+------------------                           



                          ---+------------------                           



                          -------+ *Each stage                           



                          assumes the associated                           



                          GFR level has been in                           



                          effect for at least                           



                          three months. ?Stages                           



                          1 to 5, with or                           



                          without kidney                           



                          disease, indicate                           



                          chronic kidney                           



                          disease. Notes:                           



                          Determination of                           



                          stages one and two                           



                          (with eGFR                             



                          >59mL/min/1.73 m2)                           



                          requires estimation of                           



                          kidney damage for at                           



                          least three months as                           



                          defined by structural                           



                          or functional                           



                          abnormalities of the                           



                          kidney, manifested by                           



                          either:Pathological                           



                          abnormalities or                           



                          Markers of kidney                           



                          damage (including                           



                          abnormalities in the                           



                          composition of the                           



                          blood or urine or                           



                          abnormalities in                           



                          imaging tests).                           

 

             Lab Interpretation Abnormal                               



             (test code = 72861-8)                                        



[facility] with [id number] 12:23:51





             Test Item    Value        Reference Range Interpretation Comments

 

             WBC (test code =              See_Comment                [Automated



             8527-2)                                             message] The sy

stem



                                                                 which generated



                                                                 this result



                                                                 transmitted



                                                                 reference range

:



                                                                 4.20 - 10.70



                                                                 10*3/?L. The



                                                                 reference range

 was



                                                                 not used to



                                                                 interpret this



                                                                 result as



                                                                 normal/abnormal

.

 

             RBC (test code =              See_Comment                [Automated



             430-2)                                              message] The sy

stem



                                                                 which generated



                                                                 this result



                                                                 transmitted



                                                                 reference range

:



                                                                 4.26 - 5.52



                                                                 10*6/?L. The



                                                                 reference range

 was



                                                                 not used to



                                                                 interpret this



                                                                 result as



                                                                 normal/abnormal

.

 

             HGB (test code = 14.9 g/dL    12.2-16.4                 



             718-7)                                              

 

             HCT (test code = 44.2 %       38.4-49.3                 



             4544-3)                                             

 

             MCV (test code = 88.4 fL      81.7-95.6                 



             787-2)                                              

 

             MCH (test code = 29.8 pg      26.1-32.7                 



             785-6)                                              

 

             MCHC (test code = 33.7 g/dL    31.2-35.0                 



             786-4)                                              

 

             RDW-SD (test code = 49.3 fL      38.5-51.6                 



             69780-7)                                            

 

             RDW-CV (test code = 15.3 %       12.1-15.4                 



             788-0)                                              

 

             PLT (test code =              See_Comment  H             [Automated



             777-3)                                              message] The sy

stem



                                                                 which generated



                                                                 this result



                                                                 transmitted



                                                                 reference range

:



                                                                 150 - 328 10*3/

?L.



                                                                 The reference r

zhen



                                                                 was not used to



                                                                 interpret this



                                                                 result as



                                                                 normal/abnormal

.

 

             MPV (test code = 10.2 fL      9.8-13.0                  



             56914-0)                                            

 

             NRBC/100 WBC (test              See_Comment                [Automat

ed



             code = 0009321358)                                        message] 

The system



                                                                 which generated



                                                                 this result



                                                                 transmitted



                                                                 reference range

:



                                                                 0.0 - 10.0 /100



                                                                 WBCs. The refer

ence



                                                                 range was not u

sed



                                                                 to interpret th

is



                                                                 result as



                                                                 normal/abnormal

.

 

             NRBC x10^3 (test code <0.01        See_Comment                [Auto

mated



             = 0877010988)                                        message] The s

ystem



                                                                 which generated



                                                                 this result



                                                                 transmitted



                                                                 reference range

:



                                                                 10*3/?L. The



                                                                 reference range

 was



                                                                 not used to



                                                                 interpret this



                                                                 result as



                                                                 normal/abnormal

.

 

             GRAN MAT (NEUT) % 56.7 %                                 



             (test code = 770-8)                                        

 

             IMM GRAN % (test code 0.60 %                                 



             = 0866994604)                                        

 

             LYMPH % (test code = 31.1 %                                 



             736-9)                                              

 

             MONO % (test code = 8.7 %                                  



             5905-5)                                             

 

             EOS % (test code = 2.4 %                                  



             713-8)                                              

 

             BASO % (test code = 0.5 %                                  



             706-2)                                              

 

             GRAN MAT x10^3(ANC) 4.83 10*3/uL 1.99-6.95                 



             (test code =                                        



             0640543465)                                         

 

             IMM GRAN x10^3 (test 0.05 10*3/uL 0.00-0.06                 



             code = 2856439481)                                        

 

             LYMPH x10^3 (test code 2.64 10*3/uL 1.09-3.23                 



             = 731-0)                                            

 

             MONO x10^3 (test code 0.74 10*3/uL 0.36-1.02                 



             = 742-7)                                            

 

             EOS x10^3 (test code = 0.20 10*3/uL 0.06-0.53                 



             711-2)                                              

 

             BASO x10^3 (test code 0.04 10*3/uL 0.01-0.09                 



             = 704-7)                                            

 

             Lab Interpretation Abnormal                               



             (test code = 18033-6)                                        



[facility]. METABOLIC PANEL ([id number])2022 
06:32:14





             Test Item    Value        Reference Range Interpretation Comments

 

             NA (test code = 139 mmol/L   135-145                   



             7871317548)                                         

 

             K (test code = 4.5 mmol/L   3.5-5.0                   



             8391524575)                                         

 

             CL (test code = 102 mmol/L                       



             4298781105)                                         

 

             CO2 TOTAL (test code = 26 mmol/L    23-31                     



             3622342153)                                         

 

             AGAP (test code =              2-16                      



             2002888369)                                         

 

             BUN (test code = 16 mg/dL     7-23                      



             7812658428)                                         

 

             GLUCOSE (test code = 99 mg/dL                         



             5261332106)                                         

 

             CREATININE (test code = 0.83 mg/dL   0.60-1.25                 



             2547248225)                                         

 

             TOTAL BILI (test code = 0.6 mg/dL    0.1-1.1                   



             6629711774)                                         

 

             CALCIUM (test code = 9.5 mg/dL    8.6-10.6                  



             2583184494)                                         

 

             T PROTEIN (test code = 8.6 g/dL     6.3-8.2      H            



             3412101477)                                         

 

             ALBUMIN (test code = 4.6 g/dL     3.5-5.0                   



             2654622251)                                         

 

             ALK PHOS (test code = 121 U/L                          



             2821993937)                                         

 

             ALTv (test code = 58 U/L       5-50         H            



             1742-6)                                             

 

             AST(SGOT) (test code = 32 U/L       13-40                     



             8024174596)                                         

 

             eGFR (test code =              mL/min/1.73m2              



             6670906540)                                         

 

             MAL (test code = MAL) Association of                           



                          Glomerular Filtration                           



                          Rate (GFR) and Staging                           



                          of Kidney Disease*                           



                          +---------------------                           



                          --+-------------------                           



                          --+-------------------                           



                          ------+| GFR                           



                          (mL/min/1.73 m2) ?|                           



                          With Kidney Damage ?|                           



                          ?Without Kidney                           



                          Damage+---------------                           



                          --------+-------------                           



                          --------+-------------                           



                          ------------+| ?>90 ?                           



                          ? ? ? ? ? ? ? ?|                           



                          ?Stage one ? ? ? ? ?|                           



                          ? Normal ? ? ? ? ? ? ?                           



                          ?+--------------------                           



                          ---+------------------                           



                          ---+------------------                           



                          -------+| ?60-89 ? ? ?                           



                          ? ? ? ? ?| ?Stage two                           



                          ? ? ? ? ?| ? Decreased                           



                          GFR ? ? ? ?                            



                          +---------------------                           



                          --+-------------------                           



                          --+-------------------                           



                          ------+| ?30-59 ? ? ?                           



                          ? ? ? ? ?| ?Stage                           



                          three ? ? ? ?| ? Stage                           



                          three ? ? ? ? ?                           



                          +---------------------                           



                          --+-------------------                           



                          --+-------------------                           



                          ------+| ?15-29 ? ? ?                           



                          ? ? ? ? ?| ?Stage four                           



                          ? ? ? ? | ? Stage four                           



                          ? ? ? ? ?                              



                          ?+--------------------                           



                          ---+------------------                           



                          ---+------------------                           



                          -------+| ?<15 (or                           



                          dialysis) ? ?| ?Stage                           



                          five ? ? ? ? | ? Stage                           



                          five ? ? ? ? ?                           



                          ?+--------------------                           



                          ---+------------------                           



                          ---+------------------                           



                          -------+ *Each stage                           



                          assumes the associated                           



                          GFR level has been in                           



                          effect for at least                           



                          three months. ?Stages                           



                          1 to 5, with or                           



                          without kidney                           



                          disease, indicate                           



                          chronic kidney                           



                          disease. Notes:                           



                          Determination of                           



                          stages one and two                           



                          (with eGFR                             



                          >59mL/min/1.73 m2)                           



                          requires estimation of                           



                          kidney damage for at                           



                          least three months as                           



                          defined by structural                           



                          or functional                           



                          abnormalities of the                           



                          kidney, manifested by                           



                          either:Pathological                           



                          abnormalities or                           



                          Markers of kidney                           



                          damage (including                           



                          abnormalities in the                           



                          composition of the                           



                          blood or urine or                           



                          abnormalities in                           



                          imaging tests).                           

 

             Lab Interpretation Abnormal                               



             (test code = 41167-9)                                        



[facility] WITH [id number] 05:48:31





             Test Item    Value        Reference Range Interpretation Comments

 

             WBC (test code =              See_Comment  H             [Automated



             3302-2)                                             message] The sy

stem



                                                                 which generated



                                                                 this result



                                                                 transmitted



                                                                 reference range

:



                                                                 4.20 - 10.70



                                                                 10*3/?L. The



                                                                 reference range

 was



                                                                 not used to



                                                                 interpret this



                                                                 result as



                                                                 normal/abnormal

.

 

             RBC (test code =              See_Comment  H             [Automated



             809-8)                                              message] The sy

stem



                                                                 which generated



                                                                 this result



                                                                 transmitted



                                                                 reference range

:



                                                                 4.26 - 5.52



                                                                 10*6/?L. The



                                                                 reference range

 was



                                                                 not used to



                                                                 interpret this



                                                                 result as



                                                                 normal/abnormal

.

 

             HGB (test code = 17.3 g/dL    12.2-16.4    H            



             718-7)                                              

 

             HCT (test code = 51.4 %       38.4-49.3    H            



             4544-3)                                             

 

             MCV (test code = 87.7 fL      81.7-95.6                 



             787-2)                                              

 

             MCH (test code = 29.5 pg      26.1-32.7                 



             785-6)                                              

 

             MCHC (test code = 33.7 g/dL    31.2-35.0                 



             786-4)                                              

 

             RDW-SD (test code = 49.1 fL      38.5-51.6                 



             30694-2)                                            

 

             RDW-CV (test code = 15.5 %       12.1-15.4    H            



             788-0)                                              

 

             PLT (test code =              See_Comment  H             [Automated



             777-3)                                              message] The sy

stem



                                                                 which generated



                                                                 this result



                                                                 transmitted



                                                                 reference range

:



                                                                 150 - 328 10*3/

?L.



                                                                 The reference r

zhen



                                                                 was not used to



                                                                 interpret this



                                                                 result as



                                                                 normal/abnormal

.

 

             MPV (test code = 10.4 fL      9.8-13.0                  



             39023-4)                                            

 

             NRBC/100 WBC (test              See_Comment                [Automat

ed



             code = 0715614722)                                        message] 

The system



                                                                 which generated



                                                                 this result



                                                                 transmitted



                                                                 reference range

:



                                                                 0.0 - 10.0 /100



                                                                 WBCs. The refer

ence



                                                                 range was not u

sed



                                                                 to interpret th

is



                                                                 result as



                                                                 normal/abnormal

.

 

             NRBC x10^3 (test code <0.01        See_Comment                [Auto

mated



             = [id number])                                        message] The s

ystem



                                                                 which generated



                                                                 this result



                                                                 transmitted



                                                                 reference range

:



                                                                 10*3/?L. The



                                                                 reference range

 was



                                                                 not used to



                                                                 interpret this



                                                                 result as



                                                                 normal/abnormal

.

 

             GRAN MAT (NEUT) % 64.8 %                                 



             (test code = 770-8)                                        

 

             IMM GRAN % (test code 0.70 %                                 



             = 7524610007)                                        

 

             LYMPH % (test code = 25.2 %                                 



             736-9)                                              

 

             MONO % (test code = 6.9 %                                  



             5905-5)                                             

 

             EOS % (test code = 1.9 %                                  



             713-8)                                              

 

             BASO % (test code = 0.5 %                                  



             706-2)                                              

 

             GRAN MAT x10^3(ANC) 7.80 10*3/uL 1.99-6.95    H            



             (test code =                                        



             1017901773)                                         

 

             IMM GRAN x10^3 (test 0.08 10*3/uL 0.00-0.06    H            



             code = [id number])                                        

 

             LYMPH x10^3 (test code 3.04 10*3/uL 1.09-3.23                 



             = 731-0)                                            

 

             MONO x10^3 (test code 0.83 10*3/uL 0.36-1.02                 



             = 742-7)                                            

 

             EOS x10^3 (test code = 0.23 10*3/uL 0.06-0.53                 



             711-2)                                              

 

             BASO x10^3 (test code 0.06 10*3/uL 0.01-0.09                 



             = 704-7)                                            

 

             Lab Interpretation Abnormal                               



             (test code = 27218-2)                                        



[facility]. METABOLIC PANEL ([id number] 
02:19:08





             Test Item    Value        Reference Range Interpretation Comments

 

             NA (test code = 136 mmol/L   135-145                   



             1907235927)                                         

 

             K (test code = 4.4 mmol/L   3.5-5.0                   



             9351390144)                                         

 

             CL (test code = 99 mmol/L                        



             0258664204)                                         

 

             CO2 TOTAL (test code = 25 mmol/L    23-31                     



             0868601308)                                         

 

             AGAP (test code =              2-16                      



             6002972631)                                         

 

             BUN (test code = 19 mg/dL     7-23                      



             3863678144)                                         

 

             GLUCOSE (test code = 103 mg/dL                        



             7644219660)                                         

 

             CREATININE (test code = 0.70 mg/dL   0.60-1.25                 



             2185560392)                                         

 

             TOTAL BILI (test code = 0.7 mg/dL    0.1-1.1                   



             6614282668)                                         

 

             CALCIUM (test code = 9.2 mg/dL    8.6-10.6                  



             5744454754)                                         

 

             T PROTEIN (test code = 9.4 g/dL     6.3-8.2      H            



             1905211536)                                         

 

             ALBUMIN (test code = 4.8 g/dL     3.5-5.0                   



             5213273069)                                         

 

             ALK PHOS (test code = 146 U/L             H            



             2658076450)                                         

 

             ALTv (test code = 75 U/L       5-50         H            



             1742-6)                                             

 

             AST(SGOT) (test code = 37 U/L       13-40                     



             5420764120)                                         

 

             eGFR (test code =              mL/min/1.73m2              



             2173851357)                                         

 

             MAL (test code = MAL) Association of                           



                          Glomerular Filtration                           



                          Rate (GFR) and Staging                           



                          of Kidney Disease*                           



                          +---------------------                           



                          --+-------------------                           



                          --+-------------------                           



                          ------+| GFR                           



                          (mL/min/1.73 m2) ?|                           



                          With Kidney Damage ?|                           



                          ?Without Kidney                           



                          Damage+---------------                           



                          --------+-------------                           



                          --------+-------------                           



                          ------------+| ?>90 ?                           



                          ? ? ? ? ? ? ? ?|                           



                          ?Stage one ? ? ? ? ?|                           



                          ? Normal ? ? ? ? ? ? ?                           



                          ?+--------------------                           



                          ---+------------------                           



                          ---+------------------                           



                          -------+| ?60-89 ? ? ?                           



                          ? ? ? ? ?| ?Stage two                           



                          ? ? ? ? ?| ? Decreased                           



                          GFR ? ? ? ?                            



                          +---------------------                           



                          --+-------------------                           



                          --+-------------------                           



                          ------+| ?30-59 ? ? ?                           



                          ? ? ? ? ?| ?Stage                           



                          three ? ? ? ?| ? Stage                           



                          three ? ? ? ? ?                           



                          +---------------------                           



                          --+-------------------                           



                          --+-------------------                           



                          ------+| ?15-29 ? ? ?                           



                          ? ? ? ? ?| ?Stage four                           



                          ? ? ? ? | ? Stage four                           



                          ? ? ? ? ?                              



                          ?+--------------------                           



                          ---+------------------                           



                          ---+------------------                           



                          -------+| ?<15 (or                           



                          dialysis) ? ?| ?Stage                           



                          five ? ? ? ? | ? Stage                           



                          five ? ? ? ? ?                           



                          ?+--------------------                           



                          ---+------------------                           



                          ---+------------------                           



                          -------+ *Each stage                           



                          assumes the associated                           



                          GFR level has been in                           



                          effect for at least                           



                          three months. ?Stages                           



                          1 to 5, with or                           



                          without kidney                           



                          disease, indicate                           



                          chronic kidney                           



                          disease. Notes:                           



                          Determination of                           



                          stages one and two                           



                          (with eGFR                             



                          >59mL/min/1.73 m2)                           



                          requires estimation of                           



                          kidney damage for at                           



                          least three months as                           



                          defined by structural                           



                          or functional                           



                          abnormalities of the                           



                          kidney, manifested by                           



                          either:Pathological                           



                          abnormalities or                           



                          Markers of kidney                           



                          damage (including                           



                          abnormalities in the                           



                          composition of the                           



                          blood or urine or                           



                          abnormalities in                           



                          imaging tests).                           

 

             Lab Interpretation Abnormal                               



             (test code = 73825-7)                                        



[facility]





             Test Item    Value        Reference Range Interpretation Comments

 

             LIPASE (test code = 8526845963) 86 U/L       0-220                 

    

 

             Lab Interpretation (test code = Normal                             

    



             11586-9)                                            



[id number] 01[facility]55:49





             Test Item    Value        Reference Range Interpretation Comments

 

             WBC (test code =              See_Comment  H             [Automated



             1836-2)                                             message] The sy

stem



                                                                 which generated



                                                                 this result



                                                                 transmitted



                                                                 reference range

:



                                                                 4.20 - 10.70



                                                                 10*3/?L. The



                                                                 reference range

 was



                                                                 not used to



                                                                 interpret this



                                                                 result as



                                                                 normal/abnormal

.

 

             RBC (test code =              See_Comment  H             [Automated



             056-8)                                              message] The sy

stem



                                                                 which generated



                                                                 this result



                                                                 transmitted



                                                                 reference range

:



                                                                 4.26 - 5.52



                                                                 10*6/?L. The



                                                                 reference range

 was



                                                                 not used to



                                                                 interpret this



                                                                 result as



                                                                 normal/abnormal

.

 

             HGB (test code = 18.0 g/dL    12.2-16.4    H            



             718-7)                                              

 

             HCT (test code = 53.9 %       38.4-49.3    H            



             4544-3)                                             

 

             MCV (test code = 87.2 fL      81.7-95.6                 



             787-2)                                              

 

             MCH (test code = 29.1 pg      26.1-32.7                 



             785-6)                                              

 

             MCHC (test code = 33.4 g/dL    31.2-35.0                 



             786-4)                                              

 

             RDW-SD (test code = 48.7 fL      38.5-51.6                 



             53193-4)                                            

 

             RDW-CV (test code = 15.4 %       12.1-15.4                 



             788-0)                                              

 

             PLT (test code =              See_Comment  H             [Automated



             777-3)                                              message] The sy

stem



                                                                 which generated



                                                                 this result



                                                                 transmitted



                                                                 reference range

:



                                                                 150 - 328 10*3/

?L.



                                                                 The reference r

zhen



                                                                 was not used to



                                                                 interpret this



                                                                 result as



                                                                 normal/abnormal

.

 

             MPV (test code = 9.9 fL       9.8-13.0                  



             51403-0)                                            

 

             NRBC/100 WBC (test              See_Comment                [Automat

ed



             code = 9683770321)                                        message] 

The system



                                                                 which generated



                                                                 this result



                                                                 transmitted



                                                                 reference range

:



                                                                 0.0 - 10.0 /100



                                                                 WBCs. The refer

ence



                                                                 range was not u

sed



                                                                 to interpret th

is



                                                                 result as



                                                                 normal/abnormal

.

 

             NRBC x10^3 (test code <0.01        See_Comment                [Auto

mated



             = [id number])                                        message] The s

ystem



                                                                 which generated



                                                                 this result



                                                                 transmitted



                                                                 reference range

:



                                                                 10*3/?L. The



                                                                 reference range

 was



                                                                 not used to



                                                                 interpret this



                                                                 result as



                                                                 normal/abnormal

.

 

             GRAN MAT (NEUT) % 69.8 %                                 



             (test code = 770-8)                                        

 

             IMM GRAN % (test code 0.50 %                                 



             = 0996246817)                                        

 

             LYMPH % (test code = 20.5 %                                 



             736-9)                                              

 

             MONO % (test code = 6.8 %                                  



             5905-5)                                             

 

             EOS % (test code = 1.9 %                                  



             713-8)                                              

 

             BASO % (test code = 0.5 %                                  



             706-2)                                              

 

             GRAN MAT x10^3(ANC) 7.72 10*3/uL 1.99-6.95    H            



             (test code =                                        



             8661907834)                                         

 

             IMM GRAN x10^3 (test 0.05 10*3/uL 0.00-0.06                 



             code = 8841421328)                                        

 

             LYMPH x10^3 (test code 2.26 10*3/uL 1.09-3.23                 



             = 731-0)                                            

 

             MONO x10^3 (test code 0.75 10*3/uL 0.36-1.02                 



             = 742-7)                                            

 

             EOS x10^3 (test code = 0.21 10*3/uL 0.06-0.53                 



             711-2)                                              

 

             BASO x10^3 (test code 0.06 10*3/uL 0.01-0.09                 



             = 704-7)                                            

 

             Lab Interpretation Abnormal                               



             (test code = 23817-7)                                        



[id number]





             Test Item    Value        Reference    Interpretation Comments



                                       Range                     

 

             D-DIMER (test code =              See_Comment                [Autom

ated



             4829381003)                                         message] The



                                                                 system which



                                                                 generated this



                                                                 result



                                                                 transmitted



                                                                 reference range

:



                                                                 <0.41 ?g/mL



                                                                 (FEU). The



                                                                 reference range



                                                                 was not used to



                                                                 interpret this



                                                                 result as



                                                                 normal/abnormal

.

 

             MAL (test code = This test may be                           



             MAL)         used in conjunction                           



                          with a clinical                           



                          pretest probability                           



                          (PTP) assessment                           



                          model to exclude                           



                          venous                                 



                          thromboembolism                           



                          (VTE) in patients                           



                          suspected of deep                           



                          venous thrombosis                           



                          (DVT) and pulmonary                           



                          embolism (PE)  A                           



                          D-Dimer value less                           



                          than 0.50 ?g/ml                           



                          (FEU) has a negative                           



                          predicative value of                           



                          96 to 100% (95%                           



                          CI)and 97 to 100%                           



                          (95% CI) as an aid                           



                          in the diagnosis of                           



                          deep vein thrombosis                           



                          (DVT) and pulmonary                           



                          embolism when there                           



                          is low or moderate                           



                          pretest probability                           



                          of PE or DVT.                           



                          D-Dimer values are                           



                          expressed in initial                           



                          fibrinogen                             



                          equivalent units                           



                          (FEU)" The assay                           



                          results should be                           



                          used with other                           



                          information,                           



                          including the                           



                          clinical context, in                           



                          forming a diagnosis.                           



                                                                 

 

             Lab Interpretation Normal                                 



             (test code =                                        



             99906-7)                                            



[id number] 
22:42:48





             Test Item    Value        Reference Range Interpretation Comments

 

             NA (test code = 135 mmol/L   135-145                   



             4752891062)                                         

 

             K (test code = 4.6 mmol/L   3.5-5.0                   



             9891957266)                                         

 

             CL (test code = 102 mmol/L                       



             5424169375)                                         

 

             CO2 TOTAL (test code = 24 mmol/L    23-31                     



             9451413882)                                         

 

             AGAP (test code =              2-16                      



             3644663659)                                         

 

             BUN (test code = 17 mg/dL     7-23                      



             0622314301)                                         

 

             GLUCOSE (test code = 107 mg/dL                        



             9454869537)                                         

 

             CREATININE (test code = 0.60 mg/dL   0.60-1.25                 



             6071509672)                                         

 

             TOTAL BILI (test code = 1.0 mg/dL    0.1-1.1                   



             1609609418)                                         

 

             CALCIUM (test code = 9.4 mg/dL    8.6-10.6                  



             5689584364)                                         

 

             T PROTEIN (test code = 8.6 g/dL     6.3-8.2      H            



             8169952012)                                         

 

             ALBUMIN (test code = 4.6 g/dL     3.5-5.0                   



             0122118156)                                         

 

             ALK PHOS (test code = 159 U/L             H            



             4500679452)                                         

 

             ALTv (test code = 77 U/L       5-50         H            



             1742-6)                                             

 

             AST(SGOT) (test code = 38 U/L       13-40                     



             4636575889)                                         

 

             eGFR (test code =              mL/min/1.73m2              



             5278225223)                                         

 

             MAL (test code = MAL) Association of                           



                          Glomerular Filtration                           



                          Rate (GFR) and Staging                           



                          of Kidney Disease*                           



                          +---------------------                           



                          --+-------------------                           



                          --+-------------------                           



                          ------+| GFR                           



                          (mL/min/1.73 m2) ?|                           



                          With Kidney Damage ?|                           



                          ?Without Kidney                           



                          Damage+---------------                           



                          --------+-------------                           



                          --------+-------------                           



                          ------------+| ?>90 ?                           



                          ? ? ? ? ? ? ? ?|                           



                          ?Stage one ? ? ? ? ?|                           



                          ? Normal ? ? ? ? ? ? ?                           



                          ?+--------------------                           



                          ---+------------------                           



                          ---+------------------                           



                          -------+| ?60-89 ? ? ?                           



                          ? ? ? ? ?| ?Stage two                           



                          ? ? ? ? ?| ? Decreased                           



                          GFR ? ? ? ?                            



                          +---------------------                           



                          --+-------------------                           



                          --+-------------------                           



                          ------+| ?30-59 ? ? ?                           



                          ? ? ? ? ?| ?Stage                           



                          three ? ? ? ?| ? Stage                           



                          three ? ? ? ? ?                           



                          +---------------------                           



                          --+-------------------                           



                          --+-------------------                           



                          ------+| ?15-29 ? ? ?                           



                          ? ? ? ? ?| ?Stage four                           



                          ? ? ? ? | ? Stage four                           



                          ? ? ? ? ?                              



                          ?+--------------------                           



                          ---+------------------                           



                          ---+------------------                           



                          -------+| ?<15 (or                           



                          dialysis) ? ?| ?Stage                           



                          five ? ? ? ? | ? Stage                           



                          five ? ? ? ? ?                           



                          ?+--------------------                           



                          ---+------------------                           



                          ---+------------------                           



                          -------+ *Each stage                           



                          assumes the associated                           



                          GFR level has been in                           



                          effect for at least                           



                          three months. ?Stages                           



                          1 to 5, with or                           



                          without kidney                           



                          disease, indicate                           



                          chronic kidney                           



                          disease. Notes:                           



                          Determination of                           



                          stages one and two                           



                          (with eGFR                             



                          >59mL/min/1.73 m2)                           



                          requires estimation of                           



                          kidney damage for at                           



                          least three months as                           



                          defined by structural                           



                          or functional                           



                          abnormalities of the                           



                          kidney, manifested by                           



                          either:Pathological                           



                          abnormalities or                           



                          Markers of kidney                           



                          damage (including                           



                          abnormalities in the                           



                          composition of the                           



                          blood or urine or                           



                          abnormalities in                           



                          imaging tests).                           

 

             Lab Interpretation Abnormal                               



             (test code = 42749-6)                                        



[facility] WITH DIFF2022-01-15 22:30:27





             Test Item    Value        Reference Range Interpretation Comments

 

             WBC (test code =              See_Comment  H             [Automated



             6690-2)                                             message] The



                                                                 system which



                                                                 generated this



                                                                 result transmit

huma



                                                                 reference range

:



                                                                 4.20 - 10.70



                                                                 10*3/?L. The



                                                                 reference range



                                                                 was not used to



                                                                 interpret this



                                                                 result as



                                                                 normal/abnormal

.

 

             RBC (test code =              See_Comment  H             [Automated



             789-8)                                              message] The



                                                                 system which



                                                                 generated this



                                                                 result transmit

huma



                                                                 reference range

:



                                                                 4.26 - 5.52



                                                                 10*6/?L. The



                                                                 reference range



                                                                 was not used to



                                                                 interpret this



                                                                 result as



                                                                 normal/abnormal

.

 

             HGB (test code = 17.5 g/dL    12.2-16.4    H            



             718-7)                                              

 

             HCT (test code = 51.0 %       38.4-49.3    H            



             4544-3)                                             

 

             MCV (test code = 86.7 fL      81.7-95.6                 



             787-2)                                              

 

             MCH (test code = 29.8 pg      26.1-32.7                 



             785-6)                                              

 

             MCHC (test code = 34.3 g/dL    31.2-35.0                 



             786-4)                                              

 

             RDW-SD (test code = 48.0 fL      38.5-51.6                 



             44070-1)                                            

 

             RDW-CV (test code = 15.1 %       12.1-15.4                 



             788-0)                                              

 

             PLT (test code =              See_Comment  H             [Automated



             777-3)                                              message] The



                                                                 system which



                                                                 generated this



                                                                 result transmit

huma



                                                                 reference range

:



                                                                 150 - 328 10*3/

?L.



                                                                 The reference



                                                                 range was not u

sed



                                                                 to interpret th

is



                                                                 result as



                                                                 normal/abnormal

.

 

             MPV (test code = 10.3 fL      9.8-13.0                  



             66157-3)                                            

 

             NRBC/100 WBC (test              See_Comment                [Automat

ed



             code = 7814951753)                                        message] 

The



                                                                 system which



                                                                 generated this



                                                                 result transmit

huma



                                                                 reference range

:



                                                                 0.0 - 10.0 /100



                                                                 WBCs. The



                                                                 reference range



                                                                 was not used to



                                                                 interpret this



                                                                 result as



                                                                 normal/abnormal

.

 

             NRBC x10^3 (test code <0.01        See_Comment                [Auto

mated



             = [id number])                                        message] The



                                                                 system which



                                                                 generated this



                                                                 result transmit

huma



                                                                 reference range

:



                                                                 10*3/?L. The



                                                                 reference range



                                                                 was not used to



                                                                 interpret this



                                                                 result as



                                                                 normal/abnormal

.

 

             GRAN MAT (NEUT) % 79.9 %                                 



             (test code = 770-8)                                        

 

             IMM GRAN % (test code 0.50 %                                 



             = [id number])                                        

 

             LYMPH % (test code = 11.8 %                                 



             736-9)                                              

 

             MONO % (test code = 6.6 %                                  



             5905-5)                                             

 

             EOS % (test code = 0.9 %                                  



             713-8)                                              

 

             BASO % (test code = 0.3 %                                  



             706-2)                                              

 

             GRAN MAT x10^3(ANC) 10.70 10*3/uL 1.99-6.95    H            



             (test code =                                        



             2818015378)                                         

 

             IMM GRAN x10^3 (test 0.07 10*3/uL 0.00-0.06    H            



             code = 5225847676)                                        

 

             LYMPH x10^3 (test code 1.58 10*3/uL 1.09-3.23                 



             = 731-0)                                            

 

             MONO x10^3 (test code 0.89 10*3/uL 0.36-1.02                 



             = 742-7)                                            

 

             EOS x10^3 (test code = 0.12 10*3/uL 0.06-0.53                 



             711-2)                                              

 

             BASO x10^3 (test code 0.04 10*3/uL 0.01-0.09                 



             = 704-7)                                            

 

             Lab Interpretation Abnormal                               



             (test code = 44440-3)                                        



[id number]15





             Test Item    Value        Reference Range Interpretation Comments

 

             NA (test code = 137 mmol/L   135-145                   



             9820061587)                                         

 

             K (test code = 4.5 mmol/L   3.5-5.0                   



             0878736179)                                         

 

             CL (test code = 109 mmol/L          H            



             9591147569)                                         

 

             CO2 TOTAL (test code = 22 mmol/L    23-31        L            



             9560245862)                                         

 

             AGAP (test code =              2-16                      



             0500218852)                                         

 

             BUN (test code = 7 mg/dL      7-23                      



             9656977748)                                         

 

             GLUCOSE (test code = 87 mg/dL                         



             3497374929)                                         

 

             CREATININE (test code = 0.61 mg/dL   0.60-1.25                 



             2831059046)                                         

 

             TOTAL BILI (test code = 0.5 mg/dL    0.1-1.1                   



             0023081678)                                         

 

             CALCIUM (test code = 9.0 mg/dL    8.6-10.6                  



             7612699761)                                         

 

             T PROTEIN (test code = 6.9 g/dL     6.3-8.2                   



             1310179378)                                         

 

             ALBUMIN (test code = 3.5 g/dL     3.5-5.0                   



             8191988283)                                         

 

             ALK PHOS (test code = 112 U/L                          



             4312336207)                                         

 

             ALTv (test code = 35 U/L       5-50                      



             1742-6)                                             

 

             AST(SGOT) (test code = 21 U/L       13-40                     



             6204343934)                                         

 

             eGFR (test code =              mL/min/1.73m2              



             9191218748)                                         

 

             MAL (test code = MAL) Association of                           



                          Glomerular Filtration                           



                          Rate (GFR) and Staging                           



                          of Kidney Disease*                           



                          +---------------------                           



                          --+-------------------                           



                          --+-------------------                           



                          ------+| GFR                           



                          (mL/min/1.73 m2) ?|                           



                          With Kidney Damage ?|                           



                          ?Without Kidney                           



                          Damage+---------------                           



                          --------+-------------                           



                          --------+-------------                           



                          ------------+| ?>90 ?                           



                          ? ? ? ? ? ? ? ?|                           



                          ?Stage one ? ? ? ? ?|                           



                          ? Normal ? ? ? ? ? ? ?                           



                          ?+--------------------                           



                          ---+------------------                           



                          ---+------------------                           



                          -------+| ?60-89 ? ? ?                           



                          ? ? ? ? ?| ?Stage two                           



                          ? ? ? ? ?| ? Decreased                           



                          GFR ? ? ? ?                            



                          +---------------------                           



                          --+-------------------                           



                          --+-------------------                           



                          ------+| ?30-59 ? ? ?                           



                          ? ? ? ? ?| ?Stage                           



                          three ? ? ? ?| ? Stage                           



                          three ? ? ? ? ?                           



                          +---------------------                           



                          --+-------------------                           



                          --+-------------------                           



                          ------+| ?15-29 ? ? ?                           



                          ? ? ? ? ?| ?Stage four                           



                          ? ? ? ? | ? Stage four                           



                          ? ? ? ? ?                              



                          ?+--------------------                           



                          ---+------------------                           



                          ---+------------------                           



                          -------+| ?<15 (or                           



                          dialysis) ? ?| ?Stage                           



                          five ? ? ? ? | ? Stage                           



                          five ? ? ? ? ?                           



                          ?+--------------------                           



                          ---+------------------                           



                          ---+------------------                           



                          -------+ *Each stage                           



                          assumes the associated                           



                          GFR level has been in                           



                          effect for at least                           



                          three months. ?Stages                           



                          1 to 5, with or                           



                          without kidney                           



                          disease, indicate                           



                          chronic kidney                           



                          disease. Notes:                           



                          Determination of                           



                          stages one and two                           



                          (with eGFR                             



                          >59mL/min/1.73 m2)                           



                          requires estimation of                           



                          kidney damage for at                           



                          least three months as                           



                          defined by structural                           



                          or functional                           



                          abnormalities of the                           



                          kidney, manifested by                           



                          either:Pathological                           



                          abnormalities or                           



                          Markers of kidney                           



                          damage (including                           



                          abnormalities in the                           



                          composition of the                           



                          blood or urine or                           



                          abnormalities in                           



                          imaging tests).                           

 

             Lab Interpretation Abnormal                               



             (test code = 35694-5)                                        



[facility] WITH [id number] 22:51:57





             Test Item    Value        Reference Range Interpretation Comments

 

             WBC (test code =              See_Comment  H             [Automated



             6690-2)                                             message] The sy

stem



                                                                 which generated



                                                                 this result



                                                                 transmitted



                                                                 reference range

:



                                                                 4.20 - 10.70



                                                                 10*3/?L. The



                                                                 reference range

 was



                                                                 not used to



                                                                 interpret this



                                                                 result as



                                                                 normal/abnormal

.

 

             RBC (test code =              See_Comment                [Automated



             789-8)                                              message] The sy

stem



                                                                 which generated



                                                                 this result



                                                                 transmitted



                                                                 reference range

:



                                                                 4.26 - 5.52



                                                                 10*6/?L. The



                                                                 reference range

 was



                                                                 not used to



                                                                 interpret this



                                                                 result as



                                                                 normal/abnormal

.

 

             HGB (test code = 14.7 g/dL    12.2-16.4                 



             718-7)                                              

 

             HCT (test code = 43.7 %       38.4-49.3                 



             4544-3)                                             

 

             MCV (test code = 85.9 fL      81.7-95.6                 



             787-2)                                              

 

             MCH (test code = 28.9 pg      26.1-32.7                 



             785-6)                                              

 

             MCHC (test code = 33.6 g/dL    31.2-35.0                 



             786-4)                                              

 

             RDW-SD (test code = 42.4 fL      38.5-51.6                 



             87637-6)                                            

 

             RDW-CV (test code = 13.6 %       12.1-15.4                 



             788-0)                                              

 

             PLT (test code =              See_Comment  H             [Automated



             777-3)                                              message] The sy

stem



                                                                 which generated



                                                                 this result



                                                                 transmitted



                                                                 reference range

:



                                                                 150 - 328 10*3/

?L.



                                                                 The reference r

zhen



                                                                 was not used to



                                                                 interpret this



                                                                 result as



                                                                 normal/abnormal

.

 

             MPV (test code = 9.4 fL       9.8-13.0     L            



             91431-2)                                            

 

             NRBC/100 WBC (test              See_Comment                [Automat

ed



             code = 1271584442)                                        message] 

The system



                                                                 which generated



                                                                 this result



                                                                 transmitted



                                                                 reference range

:



                                                                 0.0 - 10.0 /100



                                                                 WBCs. The refer

ence



                                                                 range was not u

sed



                                                                 to interpret th

is



                                                                 result as



                                                                 normal/abnormal

.

 

             NRBC x10^3 (test code <0.01        See_Comment                [Auto

mated



             = 7540655067)                                        message] The s

ystem



                                                                 which generated



                                                                 this result



                                                                 transmitted



                                                                 reference range

:



                                                                 10*3/?L. The



                                                                 reference range

 was



                                                                 not used to



                                                                 interpret this



                                                                 result as



                                                                 normal/abnormal

.

 

             GRAN MAT (NEUT) % 76.4 %                                 



             (test code = 770-8)                                        

 

             IMM GRAN % (test code 0.40 %                                 



             = 0683576175)                                        

 

             LYMPH % (test code = 16.3 %                                 



             736-9)                                              

 

             MONO % (test code = 5.6 %                                  



             5905-5)                                             

 

             EOS % (test code = 1.0 %                                  



             713-8)                                              

 

             BASO % (test code = 0.3 %                                  



             706-2)                                              

 

             GRAN MAT x10^3(ANC) 8.81 10*3/uL 1.99-6.95    H            



             (test code =                                        



             4675011612)                                         

 

             IMM GRAN x10^3 (test 0.05 10*3/uL 0.00-0.06                 



             code = 2173713592)                                        

 

             LYMPH x10^3 (test code 1.88 10*3/uL 1.09-3.23                 



             = 731-0)                                            

 

             MONO x10^3 (test code 0.64 10*3/uL 0.36-1.02                 



             = 742-7)                                            

 

             EOS x10^3 (test code = 0.12 10*3/uL 0.06-0.53                 



             711-2)                                              

 

             BASO x10^3 (test code 0.03 10*3/uL 0.01-0.09                 



             = 704-7)                                            

 

             Lab Interpretation Abnormal                               



             (test code = 50696-3)                                        



[facility] 
[facility]:01:33





             Test Item    Value        Reference Range Interpretation Comments

 

             SARS-CoV-2 Rapid ID NOW Not Detected Not Detected              



             (test code = 61654-2)                                        

 

             MAL (test code = MAL) ID NOW COVID-19 Assay                        

   



                          is an isothermal                           



                          nucleic acid                           



                          amplification test                           



                          intended for the                           



                          qualitative detection                           



                          of nucleic acid from                           



                          SARS-CoV-2 viral RNA                           



                          in nasopharyngeal (NP)                           



                          specimens. It is used                           



                          under Emergency Use                           



                          Authorization (EUA) by                           



                          FDA. The limit of                           



                          detection (LOD) of the                           



                          assay is 125 Genome                           



                          Equivalents/mL. A                           



                          positive result is                           



                          indicative of the                           



                          presence of SARS-CoV-2                           



                          RNA. ?Clinical                           



                          correlation with                           



                          patient history and                           



                          other diagnostic                           



                          information is                           



                          necessary to determine                           



                          patient infection                           



                          status. A negative                           



                          (Not Detected) result                           



                          does not preclude                           



                          SARS-CoV-2 infection.                           



                          In patients with                           



                          clinical symptoms and                           



                          other tests that are                           



                          consistent with                           



                          SARS-CoV-2 infection,                           



                          negative results                           



                          should be treated as                           



                          presumptive negative                           



                          and a new specimen                           



                          should be tested with                           



                          alternative PCR                           



                          molecular test.                           



                          Invalid: Please                           



                          collect a new specimen                           



                          for repeat patient                           



                          testing if clinically                           



                          indicated.                             

 

             Lab Interpretation Normal                                 



             (test code = 38301-5)                                        



[facility]. METABOLIC PANEL ([id number])2021-05-10 
01:00:33





             Test Item    Value        Reference Range Interpretation Comments

 

             NA (test code = 140 mmol/L   135-145                   



             1037772138)                                         

 

             K (test code = 4.4 mmol/L   3.5-5.0                   



             7411032645)                                         

 

             CL (test code = 103 mmol/L                       



             8107598719)                                         

 

             CO2 TOTAL (test code = 28 mmol/L    23-31                     



             7049839658)                                         

 

             AGAP (test code =              2-16                      



             3486007078)                                         

 

             BUN (test code = 15 mg/dL     7-23                      



             0515962259)                                         

 

             GLUCOSE (test code = 85 mg/dL                         



             4238623421)                                         

 

             CREATININE (test code = 0.60 mg/dL   0.60-1.25                 



             5873225050)                                         

 

             TOTAL BILI (test code = 1.1 mg/dL    0.1-1.1                   



             3290609253)                                         

 

             CALCIUM (test code = 9.0 mg/dL    8.6-10.6                  



             5889943798)                                         

 

             T PROTEIN (test code = 7.8 g/dL     6.3-8.2                   



             4904576013)                                         

 

             ALBUMIN (test code = 4.0 g/dL     3.5-5.0                   



             6159085498)                                         

 

             ALK PHOS (test code = 166 U/L             H            



             9724896162)                                         

 

             ALTv (test code = 42 U/L       5-50                      



             1742-6)                                             

 

             AST(SGOT) (test code = 32 U/L       13-40                     



             3139788246)                                         

 

             eGFR (test code =              mL/min/1.73m2              



             9002847742)                                         

 

             MAL (test code = MAL) Association of                           



                          Glomerular Filtration                           



                          Rate (GFR) and Staging                           



                          of Kidney Disease*                           



                          +---------------------                           



                          --+-------------------                           



                          --+-------------------                           



                          ------+| GFR                           



                          (mL/min/1.73 m2) ?|                           



                          With Kidney Damage ?|                           



                          ?Without Kidney                           



                          Damage+---------------                           



                          --------+-------------                           



                          --------+-------------                           



                          ------------+| ?>90 ?                           



                          ? ? ? ? ? ? ? ?|                           



                          ?Stage one ? ? ? ? ?|                           



                          ? Normal ? ? ? ? ? ? ?                           



                          ?+--------------------                           



                          ---+------------------                           



                          ---+------------------                           



                          -------+| ?60-89 ? ? ?                           



                          ? ? ? ? ?| ?Stage two                           



                          ? ? ? ? ?| ? Decreased                           



                          GFR ? ? ? ?                            



                          +---------------------                           



                          --+-------------------                           



                          --+-------------------                           



                          ------+| ?30-59 ? ? ?                           



                          ? ? ? ? ?| ?Stage                           



                          three ? ? ? ?| ? Stage                           



                          three ? ? ? ? ?                           



                          +---------------------                           



                          --+-------------------                           



                          --+-------------------                           



                          ------+| ?15-29 ? ? ?                           



                          ? ? ? ? ?| ?Stage four                           



                          ? ? ? ? | ? Stage four                           



                          ? ? ? ? ?                              



                          ?+--------------------                           



                          ---+------------------                           



                          ---+------------------                           



                          -------+| ?<15 (or                           



                          dialysis) ? ?| ?Stage                           



                          five ? ? ? ? | ? Stage                           



                          five ? ? ? ? ?                           



                          ?+--------------------                           



                          ---+------------------                           



                          ---+------------------                           



                          -------+ *Each stage                           



                          assumes the associated                           



                          GFR level has been in                           



                          effect for at least                           



                          three months. ?Stages                           



                          1 to 5, with or                           



                          without kidney                           



                          disease, indicate                           



                          chronic kidney                           



                          disease. Notes:                           



                          Determination of                           



                          stages one and two                           



                          (with eGFR                             



                          >59mL/min/1.73 m2)                           



                          requires estimation of                           



                          kidney damage for at                           



                          least three months as                           



                          defined by structural                           



                          or functional                           



                          abnormalities of the                           



                          kidney, manifested by                           



                          either:Pathological                           



                          abnormalities or                           



                          Markers of kidney                           



                          damage (including                           



                          abnormalities in the                           



                          composition of the                           



                          blood or urine or                           



                          abnormalities in                           



                          imaging tests).                           

 

             Lab Interpretation Abnormal                               



             (test code = 25542-5)                                        



[facility]





             Test Item    Value        Reference Range Interpretation Comments

 

             LIPASE (test code = 3572994949) 57 U/L       0-220                 

    

 

             Lab Interpretation (test code = Normal                             

    



             75644-6)                                            



[facility]





             Test Item    Value        Reference Range Interpretation Comments

 

             APPEARANCE (test code = Turbid       Clear        A            



             3601298271)                                         

 

             COLOR (test code = Yellow       Yellow                    



             8525373404)                                         

 

             PH (test code =              4.8-8.0                   



             1510285756)                                         

 

             SP GRAVITY (test code =              1.003-1.030               



             3470106799)                                         

 

             GLU U QUAL (test code = Normal       Normal                    



             5294493396)                                         

 

             BLOOD (test code = 1+           Negative     A            



             7485625360)                                         

 

             KETONES (test code = 20 mg/dL     Negative     A            



             7259033764)                                         

 

             PROTEIN (test code = 100 mg/dL    Negative     A            



             2887-8)                                             

 

             UROBILIN (test code = 2.0 mg/dL    Normal       A            



             5507714541)                                         

 

             BILIRUBIN (test code = Negative     Negative                  



             8572383584)                                         

 

             NITRITE (test code = Positive     Negative     A            



             9179903881)                                         

 

             LEUK FRANCE (test code = 250/uL       Negative     A            



             4128221295)                                         

 

             RBC/HPF (test code =              See_Comment  H             [Autom

ated message]



             3937101976)                                         The system [employer]



                                                                 generated this



                                                                 result transmit

huma



                                                                 reference range

: 0 -



                                                                 3 HPF. The refe

rence



                                                                 range was not u

sed



                                                                 to interpret th

is



                                                                 result as



                                                                 normal/abnormal

.

 

             WBC/HPF (test code = >182         See_Comment  H             [Autom

ated message]



             1054328136)                                         The system [employer]



                                                                 generated this



                                                                 result transmit

huma



                                                                 reference range

: 0 -



                                                                 5 HPF. The refe

rence



                                                                 range was not u

sed



                                                                 to interpret th

is



                                                                 result as



                                                                 normal/abnormal

.

 

             BACTERIA (test code = Many         Negative     A            



             0684287834)                                         

 

             MUCOUS (test code = Moderate     Negative LPF A            



             5758762456)                                         

 

             WBC CLUMPS (test code =              See_Comment  H             [Au

tomated message]



             3091643034)                                         The system [employer]



                                                                 generated this



                                                                 result transmit

huma



                                                                 reference range

: <=1



                                                                 HPF. The refere

nce



                                                                 range was not u

sed



                                                                 to interpret th

is



                                                                 result as



                                                                 normal/abnormal

.

 

             Lab Interpretation (test Abnormal                               



             code = 85938-3)                                        



[facility] WITH DIFF2021-05-10 00:46:14





             Test Item    Value        Reference Range Interpretation Comments

 

             WBC (test code =              See_Comment                [Automated



             6690-2)                                             message] The sy

stem



                                                                 which generated



                                                                 this result



                                                                 transmitted



                                                                 reference range

:



                                                                 4.20 - 10.70



                                                                 10*3/?L. The



                                                                 reference range

 was



                                                                 not used to



                                                                 interpret this



                                                                 result as



                                                                 normal/abnormal

.

 

             RBC (test code =              See_Comment                [Automated



             789-8)                                              message] The sy

stem



                                                                 which generated



                                                                 this result



                                                                 transmitted



                                                                 reference range

:



                                                                 4.26 - 5.52



                                                                 10*6/?L. The



                                                                 reference range

 was



                                                                 not used to



                                                                 interpret this



                                                                 result as



                                                                 normal/abnormal

.

 

             HGB (test code = 15.9 g/dL    12.2-16.4                 



             718-7)                                              

 

             HCT (test code = 47.1 %       38.4-49.3                 



             4544-3)                                             

 

             MCV (test code = 86.6 fL      81.7-95.6                 



             787-2)                                              

 

             MCH (test code = 29.2 pg      26.1-32.7                 



             785-6)                                              

 

             MCHC (test code = 33.8 g/dL    31.2-35.0                 



             786-4)                                              

 

             RDW-SD (test code = 47.6 fL      38.5-51.6                 



             16297-8)                                            

 

             RDW-CV (test code = 15.2 %       12.1-15.4                 



             788-0)                                              

 

             PLT (test code =              See_Comment  H             [Automated



             777-3)                                              message] The sy

stem



                                                                 which generated



                                                                 this result



                                                                 transmitted



                                                                 reference range

:



                                                                 150 - 328 10*3/

?L.



                                                                 The reference r

zhen



                                                                 was not used to



                                                                 interpret this



                                                                 result as



                                                                 normal/abnormal

.

 

             MPV (test code = 9.4 fL       9.8-13.0     L            



             82434-9)                                            

 

             NRBC/100 WBC (test              See_Comment                [Automat

ed



             code = 3982277983)                                        message] 

The system



                                                                 which generated



                                                                 this result



                                                                 transmitted



                                                                 reference range

:



                                                                 0.0 - 10.0 /100



                                                                 WBCs. The refer

ence



                                                                 range was not u

sed



                                                                 to interpret th

is



                                                                 result as



                                                                 normal/abnormal

.

 

             NRBC x10^3 (test code <0.01        See_Comment                [Auto

mated



             = 5652792919)                                        message] The s

ystem



                                                                 which generated



                                                                 this result



                                                                 transmitted



                                                                 reference range

:



                                                                 10*3/?L. The



                                                                 reference range

 was



                                                                 not used to



                                                                 interpret this



                                                                 result as



                                                                 normal/abnormal

.

 

             GRAN MAT (NEUT) % 61.3 %                                 



             (test code = 770-8)                                        

 

             IMM GRAN % (test code 0.70 %                                 



             = 1896681399)                                        

 

             LYMPH % (test code = 26.4 %                                 



             736-9)                                              

 

             MONO % (test code = 9.9 %                                  



             5905-5)                                             

 

             EOS % (test code = 1.3 %                                  



             713-8)                                              

 

             BASO % (test code = 0.4 %                                  



             706-2)                                              

 

             GRAN MAT x10^3(ANC) 6.39 10*3/uL 1.99-6.95                 



             (test code =                                        



             4663793379)                                         

 

             IMM GRAN x10^3 (test 0.07 10*3/uL 0.00-0.06    H            



             code = 2820904194)                                        

 

             LYMPH x10^3 (test code 2.75 10*3/uL 1.09-3.23                 



             = 731-0)                                            

 

             MONO x10^3 (test code 1.03 10*3/uL 0.36-1.02    H            



             = 742-7)                                            

 

             EOS x10^3 (test code = 0.14 10*3/uL 0.06-0.53                 



             711-2)                                              

 

             BASO x10^3 (test code 0.04 10*3/uL 0.01-0.09                 



             = 704-7)                                            

 

             Lab Interpretation Abnormal                               



             (test code = 16950-7)                                        



[id number]00





             Test Item    Value        Reference Range Interpretation Comments

 

             POC-GLUCOSE METER 140 mg/dL           H            : TESTED A

T [facility] [address]



             (BEAKER) (test code =                                        [facility],



             [address]38)                                               [address]:



                                                                 /Techni

christina ID



                                                                 = [id number] for ANANT CATES



POCT-GLUCOSE [id number] 09:15:00





             Test Item    Value        Reference Range Interpretation Comments

 

             POC-GLUCOSE METER 142 mg/dL           H            : TESTED A

T [facility] [address]



             (BEAKER) (test code =                                        [facility],



             [facility])                                               [address]:



                                                                 /Techni

christina ID



                                                                 = [id number] for [employer]ES



POCT-GLUCOSE METER2020-01-15 20:56:00





             Test Item    Value        Reference Range Interpretation Comments

 

             POC-GLUCOSE METER 134 mg/dL           H            : TESTED A

T [facility] [address]



             (BEAKER) (test code =                                        [facility],



             [facility]8)                                               [address]:



                                                                 /Techni

christina ID



                                                                 = [id number] for SHERRILL GUARDADO



POCT-GLUCOSE METER2020-01-15 16:46:00





             Test Item    Value        Reference Range Interpretation Comments

 

             POC-GLUCOSE METER 91 mg/dL                         : TESTED A

T [facility] [address]



             (BEAKER) (test code =                                        [facility],



             [facility]8)                                               [id number]:



                                                                 /Techni

christina ID =



                                                                 [id number] for ANANT HAMILTON



POCT-GLUCOSE METER2020-01-15 12:19:00





             Test Item    Value        Reference Range Interpretation Comments

 

             POC-GLUCOSE METER 237 mg/dL           H            : TESTED A

T [facility] 6720



             (BEAKER) (test code =                                        [facility],



             [facility]8)                                               [address]:



                                                                 /Techni

christina ID



                                                                 = [id number] for ANANT CATES



MR, EXTREMITY, LOWER, WITHOUT CONTRAST, RIGHT2020-01-15 09:12:00FINAL REPORT 
PATIENT ID:   [id number] MRI of the right and left hindfoot without intravenous 
contrast History: Osteomyelitis, diabetic foot Comparison: Radiograph dated 
2020 Technique: Multiplanar multisequence MRI of the right and left 
hindfoot was performed without intravenous contrast using the hindfoot 
osteomyelitis protocol Findings: Right foot: Marked T1 and T2 hypointense soft 
tissue thickening is noted at the medial aspect of the right heel, likely to 
reflect scar tissue. There is also mild subcutaneous edema at the lateral aspect
of the mid foot and forefoot, incompletely imaged. No discrete drainable fluid 
collection is identified. There is no marrow signal abnormality to suggest 
osteomyelitis. There is a small nonspecific joint effusion at the ankle and 
subtalar joint. Scattered degenerative changes are noted. There is marked fatty 
atrophy of the distal leg and foot musculature. Severe flexor hallucis longus 
tendinopathy. No tenosynovitis. Left foot: There is a large area ofsoft tissue 
defect and granulation tissue at the posteromedial aspect of the heel, reaching 
the calcaneus, but there is no drainable fluid collection. Deformity is noted in
the hindfoot, and the forefoot appears adducted. No acute fracture. No marrow 
signal abnormality is identified to indicate acute osteomyelitis. There is no 
significant joint effusion. There is severe atrophy of the foot and distalleg 
musculature. No significant tenosynovitis identified. IMPRESSION: 
Granulation/scar tissue at themedial aspect of the heel bilaterally. No MR 
evidence of osteomyelitis. Severe muscle atrophy. Signed: Jorge Cornejo 
Verified Date/Time:  01/15/2020 09:12:01 Reading Location: [address] Ortho 
Consult Reading Room        Electronically signed by: JORGE CORNEJO M.D. on 
01/15/2020 09:12 AMMR, EXTREMITY, LOWER, WITHOUT CONTRAST, LEFT2020--15 
09:12:00FINAL REPORT PATIENT ID:   [id number] MRI of the right and left hindfoot 
without intravenous contrast History: Osteomyelitis, diabetic foot Comparison: 
Radiograph dated 2020 Technique: Multiplanar multisequence MRI of the
right and left hindfoot was performed without intravenous contrast using the 
hindfoot osteomyelitis protocol Findings: Right foot: Marked T1 and T2 
hypointense soft tissue thickening is noted at the medial aspect of the right 
heel, likely to reflect scar tissue. There is also mild subcutaneous edema at 
the lateral aspect of the mid foot and forefoot, incompletely imaged. No 
discrete drainable fluid collection is identified. There is no marrow signal 
abnormality to suggest osteomyelitis. There is a small nonspecific joint 
effusion at the ankle and subtalar joint. Scattered degenerative changes are 
noted. There is marked fatty atrophy of the distal leg and foot musculature. 
Severe flexor hallucis longus tendinopathy. No tenosynovitis. Left foot: There 
is a large area ofsoft tissue defect and granulation tissue at the posteromedial
aspect of the heel, reaching the calcaneus, but there is no drainable fluid 
collection. Deformity is noted in the hindfoot, and the forefoot appears 
adducted. No acute fracture. No marrow signal abnormality is identified to 
indicate acute osteomyelitis. There is no significant joint effusion. There is 
severe atrophy of the foot and distalleg musculature. No significant 
tenosynovitis identified. IMPRESSION: Granulation/scar tissue at themedial 
aspect of the heel bilaterally. No MR evidence of osteomyelitis. Severe muscle 
atrophy. Signed: Jorge Cornejo Verified Date/Time:  01/15/2020 09:12:01 
Reading Location: [facility] C013X Ortho Consult Reading Room        Electronically 
signed by: JORGE CORNEJO M.D. on 01/15/2020 09:12 AMPOCT-GLUCOSE METER2020-01-15 
08:47:00





             Test Item    Value        Reference Range Interpretation Comments

 

             POC-GLUCOSE METER 264 mg/dL           H            : TESTED A

T [facility] 6720



             (BEAKER) (test code =                                        [facility],



             1538)                                               [id number]:



                                                                 /Techni

christina ID



                                                                 = [id number] for ANANT CATES



ISLET CELL AB SCR2020-01-15 07:43:00





             Test Item    Value        Reference Range Interpretation Comments

 

             ISLET CELL AB Refer to individual                           



             AUTOVERIFICATION (test Islet Cell Ab                           



             code = 2556) and/or Islet Cell                           



                          Ab Titer results.                           



POCT-GLUCOSE [id number] 21:27:00





             Test Item    Value        Reference Range Interpretation Comments

 

             POC-GLUCOSE METER 130 mg/dL           H            : TESTED A

T [facility] 6720



             (BEAKER) (test code =                                        [facility]

[facility],



             1538)                                               [address]:



                                                                 /Techni

christina ID



                                                                 = [id number] for KRANTHI PIERRE



BLOOD [id number] 13:00:00





             Test Item    Value        Reference Range Interpretation Comments

 

             CULTURE (BEAKER) (test No growth in 5 days                         

  



             code = 1095)                                        



BLOOD [id number] 13:00:00





             Test Item    Value        Reference Range Interpretation Comments

 

             CULTURE (BEAKER) (test No growth in 5 days                         

  



             code = 1095)                                        



POCT-GLUCOSE [id number] 12:57:00





             Test Item    Value        Reference Range Interpretation Comments

 

             POC-GLUCOSE METER 138 mg/dL           H            : TESTED A

T [facility]20



             ([facility]) (test code =                                        [facility],



             153)                                               [address]:



                                                                 /Techni

christina ID



                                                                 = [id number] for [place]

LLIS,



                                                                 BARBARA



POCT-GLUCOSE [id number] 08:43:00





             Test Item    Value        Reference Range Interpretation Comments

 

             POC-GLUCOSE METER 226 mg/dL           H            : TESTED A

T [facility] [address]20



             ([facility]) (test code =                                        [facility],



             153)                                               [address]:



                                                                 /Techni

christina ID



                                                                 = [id number] for [place]

LLIS,



                                                                 BARBARA



POCT-GLUCOSE [id number] 21:11:00





             Test Item    Value        Reference Range Interpretation Comments

 

             POC-GLUCOSE METER 254 mg/dL           H            : Notified

 RN/MD:



             ([facility]) (test code =                                        TESTED

 AT [place] [address]



             153)                                               [facility],



                                                                 [address]:



                                                                 /Techni

christina ID



                                                                 = [id number] for KRANTHI PIERRE



POCT-GLUCOSE [id number] 21:02:00





             Test Item    Value        Reference Range Interpretation Comments

 

             POC-GLUCOSE METER 177 mg/dL           H            : TESTED A

[facility]



             ([facility]) (test code =                                        [facility],



             153)                                               [address]:



                                                                 /Techni

christina ID



                                                                 = [id number] for [place]

LLIS,



                                                                 BARBARA



POCT-GLUCOSE [id number] 12:31:00





             Test Item    Value        Reference Range Interpretation Comments

 

             POC-GLUCOSE METER 215 mg/dL           H            : TESTED A

[facility]



             ([facility]) (test code =                                        [facility],



             153)                                               [address]:



                                                                 /Techni

christina ID



                                                                 = [id number] for [place]

LLIS,



                                                                 BARBARA



WOUND CULTURE + GRAM [id number] 10:10:00





             Test Item    Value        Reference    Interpretation Comments



                                       Range                     

 

             CULTURE ([facility]) PROTEUS MIRABILIS              A            1+ Pro

teus



             (test code = 1095)                                        mirabilis

 

             Amikacin (test code =                           S            



             1)                                                  

 

             Ampicillin +                           S            



             Sulbactam (test code                                        



             = 6)                                                

 

             Aztreonam (test code                           S            



             = 32)                                               

 

             Cefepime (test code =                           S            



             51)                                                 

 

             Cefoxitin (test code                           R            



             = 68)                                               

 

             Ceftazidime (test                           S            



             code = 27)                                          

 

             Ceftriaxone (test                           S            



             code = 52)                                          

 

             Ertapenem (test code                           S            



             = 38)                                               

 

             Gentamicin (test code                           S            



             = 18)                                               

 

             Levofloxacin (test                           R            



             code = 22)                                          

 

             Meropenem (test code                           S            



             = 34)                                               

 

             Piperacillin +                           S            



             Tazobactam (test code                                        



             = 29)                                               

 

             Tetracycline (test                           R            



             code = 2)                                           

 

             Tobramycin (test code                           S            



             = 25)                                               

 

             Trimethoprim +                           R            



             Sulfamethoxazole                                        



             (test code = 47)                                        

 

             CULTURE (BEAKER) METHICILLIN               A            1+ Methicil

bryn



             (test code = 1095) RESISTANT                              resistant



                          STAPHYLOCOCCUS                           Staphylococcu

s



                          AUREUS                                 aureus

 

             Clindamycin (test                           R            



             code = 10)                                          

 

             Erythromycin (test                           R            



             code = 4)                                           

 

             Linezolid (test code                           S            



             = 40)                                               

 

             Nitrofurantoin (test                           S            



             code = 23)                                          

 

             Oxacillin (test code                           R            



             = 14)                                               

 

             Rifampin (test code =                           S            



             43)                                                 

 

             Tetracycline (test                           S            



             code = 2)                                           

 

             Trimethoprim +                           S            



             Sulfamethoxazole                                        



             (test code = 47)                                        

 

             Vancomycin (test code                           S            



             = 13)                                               

 

             CULTURE (BEAKER) ESCHERICHIA COLI              A            <1+ Esc

herichia



             (test code = 1095)                                        coliESBL 

Positive

 

             Amikacin (test code =                           S            



             1)                                                  

 

             Ampicillin +                           R            



             Sulbactam (test code                                        



             = 6)                                                

 

             Aztreonam (test code                           R            



             = 32)                                               

 

             Cefepime (test code =                           R            



             51)                                                 

 

             Cefoxitin (test code                           R            



             = 68)                                               

 

             Ceftazidime (test                           R            



             code = 27)                                          

 

             Ceftriaxone (test                           R            



             code = 52)                                          

 

             Ertapenem (test code                           S            



             = 38)                                               

 

             Gentamicin (test code                           S            



             = 18)                                               

 

             Levofloxacin (test                           R            



             code = 22)                                          

 

             Meropenem (test code                           S            



             = 34)                                               

 

             Nitrofurantoin (test                           S            



             code = 23)                                          

 

             Piperacillin +                           R            



             Tazobactam (test code                                        



             = 29)                                               

 

             Tetracycline (test                           S            



             code = 2)                                           

 

             Tobramycin (test code                           S            



             = 25)                                               

 

             Trimethoprim +                           R            



             Sulfamethoxazole                                        



             (test code = 47)                                        

 

             CULTURE (BEAKER)                           A            Beta-hemoly

tic



             (test code = 1095)                                        streptoco

ccus



                                                                 group B, by



                                                                 serological



                                                                 grouping

 

             GRAM STAIN RESULT 3+ WBCs                                



             (BEAKER) (test code =                                        



             1123)                                               

 

             GRAM STAIN RESULT 1+ gram negative                           



             (BEAKER) (test code = rods                                   



             643863)                                             

 

             GRAM STAIN RESULT 2+ gram positive                           



             (BEAKER) (test code = rods                                   



             324798)                                             

 

             GRAM STAIN RESULT <1+ gram negative                           



             (BEAKER) (test code = coccobacilli                           



             014771)                                             

 

             GRAM STAIN RESULT 2+ gram positive                           



             (BEAKER) (test code = cocci in pairs                           



             481376)                                             



POCT-GLUCOSE [id number] 08:52:00





             Test Item    Value        Reference Range Interpretation Comments

 

             POC-GLUCOSE METER 209 mg/dL           H            : TESTED A

T [facility] 6720



             (BEAKER) (test code =                                        [facility],



             153)                                               [address]:



                                                                 /Techni

christina ID



                                                                 = [id number] for [place]

LLNIURKA,



                                                                 BARBARA



POCT-GLUCOSE [id number] 21:26:00





             Test Item    Value        Reference Range Interpretation Comments

 

             POC-GLUCOSE METER 255 mg/dL           H            : TESTED A

T [facility] 6720



             (BEAKER) (test code =                                        [facility],



             153)                                               [address]:



                                                                 /Techni

christina ID



                                                                 = [id number] for SHERRILL GUARDADO



POCT-GLUCOSE [id number] 17:34:00





             Test Item    Value        Reference Range Interpretation Comments

 

             POC-GLUCOSE METER 227 mg/dL           H            : TESTED A

T [facility] 6720



             (BEAKER) (test code =                                        [facility],



             153)                                               [address]:



                                                                 /Techni

christina ID



                                                                 = [id number] for WASSERMAN

NNY,



                                                                 NAKITA



POCT-GLUCOSE [id number] 11:28:00





             Test Item    Value        Reference Range Interpretation Comments

 

             POC-GLUCOSE METER 282 mg/dL           H            : TESTED A

T [facility] 6720



             (BEAKER) (test code =                                        [facility],



             1538)                                               [address]:



                                                                 /Techni

christina ID



                                                                 = [id number] for WASSERMAN

NNY,



                                                                 NAKITA



POCT-GLUCOSE [id number] 08:19:00





             Test Item    Value        Reference Range Interpretation Comments

 

             POC-GLUCOSE METER 329 mg/dL           H            : TESTED A

T [facility] 6720



             (BEAKER) (test code =                                        [facility],



             153)                                               [address]:



                                                                 /Techni

christina ID



                                                                 = [id number] for ANANT CATES



POCT-GLUCOSE [id number] 21:32:00





             Test Item    Value        Reference Range Interpretation Comments

 

             POC-GLUCOSE METER 275 mg/dL           H            : TESTED A

T [facility] 6720



             (BEAKER) (test code =                                        [facility]

[facility],



             1538)                                               [address]:



                                                                 /Techni

christina ID



                                                                 = [id number] for SHERRILL GUARDADO



POCT-GLUCOSE [id number] 17:47:00





             Test Item    Value        Reference Range Interpretation Comments

 

             POC-GLUCOSE METER 304 mg/dL           H            : TESTED A

T [facility] [address]20



             (BEAKER) (test code =                                        [facility]

[facility],



             1538)                                               [address]:



                                                                 /Techni

christina ID



                                                                 = [id number] for HAWK WAN,



                                                                 LUIZA



URINE [id number] 15:21:00





             Test Item    Value        Reference Range Interpretation Comments

 

             CULTURE (BEAKER)                           A            >100,000 co

l/mL



             (test code = 1095)                                        Beta-hemo

lytic



                                                                 streptococcus g

roup



                                                                 B, by serologic

al



                                                                 grouping

 

             CULTURE (BEAKER) PROTEUS                   A            80-89,000 c

ol/mL



             (test code = 1095) MIRABILIS                              Proteus



                                                                 mirabilisESBL



                                                                 Positive

 

             Amikacin (test code =                           S            



             1)                                                  

 

             Ampicillin +                           R            



             Sulbactam (test code                                        



             = 6)                                                

 

             Aztreonam (test code                           R            



             = 32)                                               

 

             Cefepime (test code =                           R            



             51)                                                 

 

             Cefoxitin (test code                           R            



             = 68)                                               

 

             Ceftazidime (test                           R            



             code = 27)                                          

 

             Ceftriaxone (test                           R            



             code = 52)                                          

 

             Ertapenem (test code                           S            



             = 38)                                               

 

             Gentamicin (test code                           R            



             = 18)                                               

 

             Levofloxacin (test                           R            



             code = 22)                                          

 

             Meropenem (test code                           S            



             = 34)                                               

 

             Nitrofurantoin (test                           R            



             code = 23)                                          

 

             Tetracycline (test                           R            



             code = 2)                                           

 

             Tobramycin (test code                           R            



             = 25)                                               



POCT-GLUCOSE [id number] 12:16:00





             Test Item    Value        Reference Range Interpretation Comments

 

             POC-GLUCOSE METER 337 mg/dL           H            : TESTED A

T [facility] [address]



             (BEAKER) (test code =                                        [facility],



             1538)                                               [id number]:



                                                                 /Techni

christina ID



                                                                 = [id number] for HAWK WAN,



                                                                 LUIZA



BASIC METABOLIC [id number] 10:39:00





             Test Item    Value        Reference Range Interpretation Comments

 

             SODIUM (BEAKER) 134 meq/L    136-145      L            



             (test code = 381)                                        

 

             POTASSIUM (BEAKER) 4.6 meq/L    3.5-5.1                   



             (test code = 379)                                        

 

             CHLORIDE (BEAKER) 105 meq/L                        



             (test code = 382)                                        

 

             CO2 (BEAKER) (test 20 meq/L     22-29        L            



             code = 355)                                         

 

             BLOOD UREA NITROGEN 14 mg/dL     7-21                      



             (BEAKER) (test code                                        



             = 354)                                              

 

             CREATININE (BEAKER) 0.97 mg/dL   0.57-1.25                 



             (test code = 358)                                        

 

             GLUCOSE RANDOM 429 mg/dL           HH           



             (BEAKER) (test code                                        



             = 652)                                              

 

             CALCIUM (BEAKER) 8.9 mg/dL    8.4-10.2                  



             (test code = 697)                                        

 

             EGFR (BEAKER) (test 107 mL/min/1.73                           ESTIM

ATED GFR IS



             code = 1092) sq m                                   NOT AS ACCURATE

 AS



                                                                 CREATININE



                                                                 CLEARANCE IN



                                                                 PREDICTING



                                                                 GLOMERULAR



                                                                 FILTRATION RATE

.



                                                                 ESTIMATED GFR I

S



                                                                 NOT APPLICABLE 

FOR



                                                                 DIALYSIS PATIEN

TS.



 ID - MAGAN FPOCT-GLUCOSE [id number] 08:29:00





             Test Item    Value        Reference Range Interpretation Comments

 

             POC-GLUCOSE METER 395 mg/dL           H            : TESTED A

T [facility] [address]



             (BEAKER) (test code =                                        [facility],



             1538)                                               [id number]:



                                                                 /Techni

christina ID



                                                                 = [id number] for LUIZA FIGUEROA



CBC W/PLT COUNT &amp; AUTO [id number] 06:40:00





             Test Item    Value        Reference Range Interpretation Comments

 

             WHITE BLOOD CELL COUNT (BEAKER) 7.2 K/ L     3.5-10.5              

    



             (test code = 775)                                        

 

             RED BLOOD CELL COUNT (BEAKER) 5.48 M/ L    4.63-6.08               

  



             (test code = 761)                                        

 

             HEMOGLOBIN (BEAKER) (test code = 15.1 GM/DL   13.7-17.5            

     



             410)                                                

 

             HEMATOCRIT (BEAKER) (test code = 46.4 %       40.1-51.0            

     



             411)                                                

 

             MEAN CORPUSCULAR VOLUME (BEAKER) 84.7 fL      79.0-92.2            

     



             (test code = 753)                                        

 

             MEAN CORPUSCULAR HEMOGLOBIN 27.6 pg      25.7-32.2                 



             (BEAKER) (test code = 751)                                        

 

             MEAN CORPUSCULAR HEMOGLOBIN CONC 32.5 GM/DL   32.3-36.5            

     



             (BEAKER) (test code = 752)                                        

 

             RED CELL DISTRIBUTION WIDTH 15.5 %       11.6-14.4    H            



             (BEAKER) (test code = 412)                                        

 

             PLATELET COUNT (BEAKER) (test 315 K/CU MM  150-450                 

  



             code = 756)                                         

 

             MEAN PLATELET VOLUME (BEAKER) 10.5 fL      9.4-12.4                

  



             (test code = 754)                                        

 

             NUCLEATED RED BLOOD CELLS 0 /100 WBC   0-0                       



             (BEAKER) (test code = 413)                                        

 

             NEUTROPHILS RELATIVE PERCENT 62 %                                  

 



             (BEAKER) (test code = 429)                                        

 

             LYMPHOCYTES RELATIVE PERCENT 26 %                                  

 



             (BEAKER) (test code = 430)                                        

 

             MONOCYTES RELATIVE PERCENT 9 %                                    



             (BEAKER) (test code = 431)                                        

 

             EOSINOPHILS RELATIVE PERCENT 2 %                                   

 



             (BEAKER) (test code = 432)                                        

 

             BASOPHILS RELATIVE PERCENT 0 %                                    



             (BEAKER) (test code = 437)                                        

 

             NEUTROPHILS ABSOLUTE COUNT 4.48 K/ L    1.78-5.38                 



             (BEAKER) (test code = 670)                                        

 

             LYMPHOCYTES ABSOLUTE COUNT 1.87 K/ L    1.32-3.57                 



             (BEAKER) (test code = 414)                                        

 

             MONOCYTES ABSOLUTE COUNT (BEAKER) 0.62 K/ L    0.30-0.82           

      



             (test code = 415)                                        

 

             EOSINOPHILS ABSOLUTE COUNT 0.17 K/ L    0.04-0.54                 



             (BEAKER) (test code = 416)                                        

 

             BASOPHILS ABSOLUTE COUNT (BEAKER) 0.03 K/ L    0.01-0.08           

      



             (test code = 417)                                        

 

             IMMATURE GRANULOCYTES-RELATIVE 1 %          0-1                    

   



             PERCENT (BEAKER) (test code =                                      

  



             2801)                                               



POCT-GLUCOSE METER2020-01-10 19:55:00





             Test Item    Value        Reference Range Interpretation Comments

 

             POC-GLUCOSE METER 369 mg/dL           H            : TESTED A

T [facility] [address]



             (BEAKER) (test code =                                        [facility],



             153)                                               [id number]:



                                                                 /Techni

christina ID



                                                                 = [id number] for SHERRILL GUARDADO



POCT-GLUCOSE METER2020-01-10 16:45:00





             Test Item    Value        Reference Range Interpretation Comments

 

             POC-GLUCOSE METER 272 mg/dL           H            : TESTED A

T [facility] [address]



             (BEAKER) (test code =                                        [facility],



             153)                                               [id number]:



                                                                 /Techni

christina ID



                                                                 = [id number] for SARAH VIDES



POCT-GLUCOSE METER2020-01-10 11:40:00





             Test Item    Value        Reference Range Interpretation Comments

 

             POC-GLUCOSE METER 277 mg/dL           H            : TESTED A

T [facility] [address]



             (BEAKER) (test code =                                        [place],



             1538)                                               [address]:



                                                                 /Techni

christina ID



                                                                 = [id number] for SARAH VIDES



BASIC METABOLIC PANEL2020-01-10 10:22:00





             Test Item    Value        Reference Range Interpretation Comments

 

             SODIUM (BEAKER) 132 meq/L    136-145      L            



             (test code = 381)                                        

 

             POTASSIUM (BEAKER) 4.4 meq/L    3.5-5.1                   



             (test code = 379)                                        

 

             CHLORIDE (BEAKER) 103 meq/L                        



             (test code = 382)                                        

 

             CO2 (BEAKER) (test 21 meq/L     22-29        L            



             code = 355)                                         

 

             BLOOD UREA NITROGEN 14 mg/dL     7-21                      



             (BEAKER) (test code                                        



             = 354)                                              

 

             CREATININE (BEAKER) 0.89 mg/dL   0.57-1.25                 



             (test code = 358)                                        

 

             GLUCOSE RANDOM 428 mg/dL           HH           



             (BEAKER) (test code                                        



             = 652)                                              

 

             CALCIUM (BEAKER) 9.2 mg/dL    8.4-10.2                  



             (test code = 697)                                        

 

             EGFR (BEAKER) (test 119 mL/min/1.73                           ESTIM

ATED GFR IS



             code = 1092) sq m                                   NOT AS ACCURATE

 AS



                                                                 CREATININE



                                                                 CLEARANCE IN



                                                                 PREDICTING



                                                                 GLOMERULAR



                                                                 FILTRATION RATE

.



                                                                 ESTIMATED GFR I

S



                                                                 NOT APPLICABLE 

FOR



                                                                 DIALYSIS PATIEN

TS.



 ID - NTPLIPID PANEL2020-01-10 10:17:00





             Test Item    Value        Reference Range Interpretation Comments

 

             TRIGLYCERIDES (BEAKER) (test code = 692 mg/dL                      

        



             540)                                                

 

             CHOLESTEROL (BEAKER) (test code = 196 mg/dL                        

      



             631)                                                

 

             HDL CHOLESTEROL (BEAKER) (test code 24 mg/dL                       

        



             = 976)                                              



Calculated LDL not valid if triglyceride &gt;400 mg/dLTriglyceride Reference 
Range:   Low Risk  &lt;150   Borderline    150-199   High Risk     200-499   
Very High Risk  &gt;=500Cholesterol Reference Range:   Low Risk         &lt;200 
 Borderline    200-239    High Risk        &gt;240HDL Cholesterol Reference 
Range:   Low Risk         &gt;=60   High Risk         &lt;40LDL Cholesterol 
ReferenceRange:   Optimal          &lt;100   Near Optimal  100-129   Borderline 
  130-159   High          160-189   Very High       &gt;=190    ID - NTP
POCT-GLUCOSE METER2020-01-10 08:28:00





             Test Item    Value        Reference Range Interpretation Comments

 

             POC-GLUCOSE METER 388 mg/dL           H            : TESTED A

T [facility] [address]



             (BEAKER) (test code =                                        [place],



             1538)                                               [address]:



                                                                 /Techni

christina ID



                                                                 = [id number] for ANANT CATES



HEMOGLOBIN A1C2020-01-10 07:54:00





             Test Item    Value        Reference Range Interpretation Comments

 

             HEMOGLOBIN A1C (BEAKER) (test code = 10.4 %       4.3-6.1      H   

         



             368)                                                



CBC W/PLT COUNT &amp; AUTO DIFFERENTIAL2020-01-10 05:56:00





             Test Item    Value        Reference Range Interpretation Comments

 

             WHITE BLOOD CELL COUNT (BEAKER) 6.5 K/ L     3.5-10.5              

    



             (test code = 775)                                        

 

             RED BLOOD CELL COUNT (BEAKER) 5.21 M/ L    4.63-6.08               

  



             (test code = 761)                                        

 

             HEMOGLOBIN (BEAKER) (test code = 14.6 GM/DL   13.7-17.5            

     



             410)                                                

 

             HEMATOCRIT (BEAKER) (test code = 44.3 %       40.1-51.0            

     



             411)                                                

 

             MEAN CORPUSCULAR VOLUME (BEAKER) 85.0 fL      79.0-92.2            

     



             (test code = 753)                                        

 

             MEAN CORPUSCULAR HEMOGLOBIN 28.0 pg      25.7-32.2                 



             (BEAKER) (test code = 751)                                        

 

             MEAN CORPUSCULAR HEMOGLOBIN CONC 33.0 GM/DL   32.3-36.5            

     



             (BEAKER) (test code = 752)                                        

 

             RED CELL DISTRIBUTION WIDTH 15.6 %       11.6-14.4    H            



             (BEAKER) (test code = 412)                                        

 

             PLATELET COUNT (BEAKER) (test 294 K/CU MM  150-450                 

  



             code = 756)                                         

 

             MEAN PLATELET VOLUME (BEAKER) 11.2 fL      9.4-12.4                

  



             (test code = 754)                                        

 

             NUCLEATED RED BLOOD CELLS 0 /100 WBC   0-0                       



             (BEAKER) (test code = 413)                                        

 

             NEUTROPHILS RELATIVE PERCENT 56 %                                  

 



             (BEAKER) (test code = 429)                                        

 

             LYMPHOCYTES RELATIVE PERCENT 31 %                                  

 



             (BEAKER) (test code = 430)                                        

 

             MONOCYTES RELATIVE PERCENT 10 %                                   



             (BEAKER) (test code = 431)                                        

 

             EOSINOPHILS RELATIVE PERCENT 2 %                                   

 



             (BEAKER) (test code = 432)                                        

 

             BASOPHILS RELATIVE PERCENT 1 %                                    



             (BEAKER) (test code = 437)                                        

 

             NEUTROPHILS ABSOLUTE COUNT 3.66 K/ L    1.78-5.38                 



             (BEAKER) (test code = 670)                                        

 

             LYMPHOCYTES ABSOLUTE COUNT 2.03 K/ L    1.32-3.57                 



             ([facility]) (test code = 414)                                        

 

             MONOCYTES ABSOLUTE COUNT (BEAKER) 0.63 K/ L    0.30-0.82           

      



             (test code = 415)                                        

 

             EOSINOPHILS ABSOLUTE COUNT 0.15 K/ L    0.04-0.54                 



             (AKER) (test code = 416)                                        

 

             BASOPHILS ABSOLUTE COUNT ([facility]) 0.03 K/ L    0.01-0.08           

      



             (test code = 417)                                        

 

             IMMATURE GRANULOCYTES-RELATIVE 1 %          0-1                    

   



             PERCENT ([facility]) (test code =                                      

  



             2801)                                               



POCT-GLUCOSE [id number] 23:47:00





             Test Item    Value        Reference Range Interpretation Comments

 

             POC-GLUCOSE METER 359 mg/dL           H            : Notified

 RN/MD:



             ([facility]) (test code =                                        TESTED

 AT [place] [address]



             153)                                               [facility],



                                                                 [address]:



                                                                 /Techni

christina ID



                                                                 = [id number] for



                                                                 LATHBRIDGE, ALESSIA

ICE



POCT-GLUCOSE [id number] 21:13:00





             Test Item    Value        Reference Range Interpretation Comments

 

             POC-GLUCOSE METER 315 mg/dL           H            : TESTED A

[facility]



             ([facility]) (test code =                                        [facility],



             153)                                               [address]:



                                                                 /Techni

christina ID



                                                                 = [id number] for Sm

ith,



                                                                 Nicki



POCT-GLUCOSE [id number] 21:13:00





             Test Item    Value        Reference Range Interpretation Comments

 

             POC-GLUCOSE METER 331 mg/dL           H            : TESTED A

[facility]



             ([facility]) (test code =                                        [facility],



             153)                                               [address]:



                                                                 /Techni

christina ID



                                                                 = [id number] for RO

DGERS,



                                                                 JAMECA



POCT-GLUCOSE [id number] 10:30:00





             Test Item    Value        Reference Range Interpretation Comments

 

             POC-GLUCOSE METER 341 mg/dL           H            : TESTED A

[facility]



             ([facility]) (test code =                                        [facility],



             153)                                               [address]:



                                                                 /Techni

christina ID



                                                                 = [id number] for Sm

ith,



                                                                 Nicki



CBC W/PLT COUNT &amp; AUTO [id number] 08:48:00





             Test Item    Value        Reference Range Interpretation Comments

 

             WHITE BLOOD CELL COUNT (BEAKER) 6.7 K/ L     3.5-10.5              

    



             (test code = 775)                                        

 

             RED BLOOD CELL COUNT (BEAKER) 5.28 M/ L    4.63-6.08               

  



             (test code = 761)                                        

 

             HEMOGLOBIN (BEAKER) (test code = 14.6 GM/DL   13.7-17.5            

     



             410)                                                

 

             HEMATOCRIT (BEAKER) (test code = 44.9 %       40.1-51.0            

     



             411)                                                

 

             MEAN CORPUSCULAR VOLUME (BEAKER) 85.0 fL      79.0-92.2            

     



             (test code = 753)                                        

 

             MEAN CORPUSCULAR HEMOGLOBIN 27.7 pg      25.7-32.2                 



             (BEAKER) (test code = 751)                                        

 

             MEAN CORPUSCULAR HEMOGLOBIN CONC 32.5 GM/DL   32.3-36.5            

     



             (BEAKER) (test code = 752)                                        

 

             RED CELL DISTRIBUTION WIDTH 15.3 %       11.6-14.4    H            



             (BEAKER) (test code = 412)                                        

 

             PLATELET COUNT (BEAKER) (test 300 K/CU MM  150-450                 

  



             code = 756)                                         

 

             MEAN PLATELET VOLUME (BEAKER) 10.4 fL      9.4-12.4                

  



             (test code = 754)                                        

 

             NUCLEATED RED BLOOD CELLS 0 /100 WBC   0-0                       



             (BEAKER) (test code = 413)                                        



(MANUAL DIFFERENTIAL)2020 08:48:00





             Test Item    Value        Reference Range Interpretation Comments

 

             NEUTROPHILS - REL (DIFF) (BEAKER) 69 %                             

      



             (test code = 1359)                                        

 

             LYMPHOCYTES - REL (DIFF) (BEAKER) 25 %                             

      



             (test code = 1360)                                        

 

             MONOCYTES - REL (DIFF) (BEAKER) 3 %                                

    



             (test code = 1361)                                        

 

             EOSINOPHILS - REL (DIFF) (BEAKER) 3 %                              

      



             (test code = 1362)                                        

 

             BASOPHILS - REL (DIFF) (BEAKER) 0 %                                

    



             (test code = 1363)                                        

 

             NEUTROPHILS - ABS (DIFF) (BEAKER) 4.62 K/ L    1.80-8.00           

      



             (test code = 1365)                                        

 

             LYMPHOCYTES - ABS (DIFF) (BEAKER) 1.68 K/ L    1.48-4.50           

      



             (test code = 1366)                                        

 

             MONOCYTES - ABS (DIFF) (BEAKER) 0.20 K/ L    0.00-1.30             

    



             (test code = 1367)                                        

 

             EOSINOPHILS - ABS (DIFF) (BEAKER) 0.20 K/ L    0.00-0.50           

      



             (test code = 1368)                                        

 

             BASOPHILS - ABS (DIFF) (BEAKER) 0.00 K/ L    0.00-0.20             

    



             (test code = 1369)                                        

 

             TOTAL COUNTED (BEAKER) (test code = 100                            

        



             1351)                                               

 

             PLT MORPHOLOGY (BEAKER) (test code Normal                          

       



             = 486)                                              

 

             RBC MORPHOLOGY (BEAKER) (test code Normal                          

       



             = 762)                                              

 

             SMUDGE CELLS (BEAKER) (test code = Present                         

       



             1371)                                               



HEMOGLOBIN [id number] 06:39:00





             Test Item    Value        Reference Range Interpretation Comments

 

             HEMOGLOBIN A1C (BEAKER) (test code = 10.5 %       4.3-6.1      H   

         



             368)                                                



LIPID [id number] 04:57:00





             Test Item    Value        Reference Range Interpretation Comments

 

             TRIGLYCERIDES (BEAKER) 1251 mg/dL                             Speci

men moderately



             (test code = 540)                                        hemolyzed

 

             CHOLESTEROL (BEAKER) 215 mg/dL                              Specime

n moderately



             (test code = 631)                                        hemolyzed

 

             HDL CHOLESTEROL 22 mg/dL                               



             (BEAKER) (test code =                                        



             976)                                                



Calculated LDL not valid if triglyceride &gt;400 mg/dLTriglyceride Reference 
Range:   Low Risk  &lt;150   Borderline    150-199   High Risk     200-499   
Very High Risk  &gt;=500Cholesterol Reference Range:   Low Risk         &lt;200 
 Borderline    200-239    High Risk        &gt;240HDL Cholesterol Reference 
Range:   Low Risk         &gt;=60   High Risk         &lt;40LDL Cholesterol 
ReferenceRange:   Optimal          &lt;100   Near Optimal  100-129   Borderline 
  130-159   High          160-189   Very High       &gt;=190   Specimen 
moderately lipemicBASIC METABOLIC [id number] 04:56:00





             Test Item    Value        Reference Range Interpretation Comments

 

             SODIUM (BEAKER) 137 meq/L    136-145                   



             (test code = 381)                                        

 

             POTASSIUM (BEAKER) 4.6 meq/L    3.5-5.1                   Specimen 

moderately



             (test code = 379)                                        hemolyzed

 

             CHLORIDE (BEAKER) 106 meq/L                        



             (test code = 382)                                        

 

             CO2 (BEAKER) (test 20 meq/L     22-29        L            



             code = 355)                                         

 

             BLOOD UREA NITROGEN 12 mg/dL     7-21                      



             (BEAKER) (test code                                        



             = 354)                                              

 

             CREATININE (BEAKER) 0.95 mg/dL   0.57-1.25                 Specimen

 moderately



             (test code = 358)                                        hemolyzed

 

             GLUCOSE RANDOM 398 mg/dL           H            



             (BEAKER) (test code                                        



             = 652)                                              

 

             CALCIUM (BEAKER) 8.8 mg/dL    8.4-10.2                  



             (test code = 697)                                        

 

             EGFR (BEAKER) (test 110 mL/min/1.73                           ESTIM

ATED GFR IS



             code = 1092) sq m                                   NOT AS ACCURATE

 AS



                                                                 CREATININE



                                                                 CLEARANCE IN



                                                                 PREDICTING



                                                                 GLOMERULAR



                                                                 FILTRATION RATE

.



                                                                 ESTIMATED GFR I

S



                                                                 NOT APPLICABLE 

FOR



                                                                 DIALYSIS PATIEN

TS.



POCT-GLUCOSE [id number] 21:53:00





             Test Item    Value        Reference Range Interpretation Comments

 

             POC-GLUCOSE METER > mg/dL             HH           : Notified

 RN/MD: TESTED



             (KUN) (test code =                                        AT [address]



             0958)                                               [facility], [address]

30:



                                                                 /Techni

christina ID =



                                                                 [id number] for ZECHARIAH TRACY



U/S, ABDOMINAL, [id number] 19:54:00Reason for exam:-&gt;Evaluation of  
shunt and for possible cyst or pseudocyst Should this be performed at the 
bedside?-&gt;YesFINAL REPORT PATIENT ID:   [id number] Limited abdominal 
ultrasound. CLINICAL HISTORY: Evaluation of VPshunt for possible cyst or 
pseudocyst. COMPARISON STUDY: None available. FINDINGS: Sonographic assessment 
of the abdomen was performed assessing for fluid in the region of the patient's 
shunts. No fluid collections are seen. However, the study is limited by the 
patient's body habitus. CT scan would bemore sensitive. Signed: Madison [place] Verified Date/Time:  2020 19:54:10 Reading Location: [address]
Consult Reading Room      Electronically signed by: MADISON HENDRICKSON M.D. on 
2020 07:54 PMPOCT-GLUCOSE [id number] 19:34:00





             Test Item    Value        Reference Range Interpretation Comments

 

             POC-GLUCOSE METER 474 mg/dL           HH           : Notified

 RN/MD:



             (KUN) (test code =                                        TESTED

 AT [facility] [address])                                               [facility],



                                                                 [id number]:



                                                                 /Techni

christina ID



                                                                 = [id number] for LONA URIOSTEGUI



URINALYSIS W/ REFLEX URINE [id number] 17:48:00





             Test Item    Value        Reference Range Interpretation Comments

 

             COLOR (BEAKER) (test code = 470) Yellow                            

     

 

             CLARITY (BEAKER) (test code = Hazy                                 

  



             469)                                                

 

             SPECIFIC GRAVITY UA (BEAKER) 1.020        1.001-1.035              

 



             (test code = 468)                                        

 

             PH UA (BEAKER) (test code = 467) 6.0          5.0-8.0              

     

 

             PROTEIN UA (BEAKER) (test code = 20 mg/dL     Negative     A       

     



             464)                                                

 

             GLUCOSE UA (BEAKER) (test code = >1000 mg/dL  Negative     A       

     



             365)                                                

 

             KETONES UA (BEAKER) (test code = 40 mg/dL     Negative     A       

     



             371)                                                

 

             BILIRUBIN UA (BEAKER) (test code Negative     Negative             

     



             = 462)                                              

 

             BLOOD UA (BEAKER) (test code = Moderate     Negative     A         

   



             461)                                                

 

             NITRITE UA (BEAKER) (test code = Positive     Negative     A       

     



             465)                                                

 

             LEUKOCYTE ESTERASE UA (BEAKER) Large        Negative     A         

   



             (test code = 466)                                        

 

             UROBILINOGEN UA (BEAKER) (test 0.2 mg/dL    0.2-1.0                

   



             code = 463)                                         

 

             RBC UA (BEAKER) (test code = 519) 0 /HPF                           

      

 

             WBC UA (BEAKER) (test code = 520) 267 /HPF                         

      

 

             BACTERIA (BEAKER) (test code = Moderate                            

   



             517)                                                

 

             SOURCE(BEAKER) (test code = 9293)                                  

      



CBC W/PLT COUNT &amp; AUTO [id number] 17:38:00





             Test Item    Value        Reference Range Interpretation Comments

 

             WHITE BLOOD CELL COUNT (BEAKER) 7.8 K/ L     3.5-10.5              

    



             (test code = 775)                                        

 

             RED BLOOD CELL COUNT (BEAKER) 5.12 M/ L    4.63-6.08               

  



             (test code = 761)                                        

 

             HEMOGLOBIN (BEAKER) (test code = 14.7 GM/DL   13.7-17.5            

     



             410)                                                

 

             HEMATOCRIT (BEAKER) (test code = 43.3 %       40.1-51.0            

     



             411)                                                

 

             MEAN CORPUSCULAR VOLUME (BEAKER) 84.6 fL      79.0-92.2            

     



             (test code = 753)                                        

 

             MEAN CORPUSCULAR HEMOGLOBIN 28.7 pg      25.7-32.2                 



             (BEAKER) (test code = 751)                                        

 

             MEAN CORPUSCULAR HEMOGLOBIN CONC 33.9 GM/DL   32.3-36.5            

     



             (BEAKER) (test code = 752)                                        

 

             RED CELL DISTRIBUTION WIDTH 15.3 %       11.6-14.4    H            



             (BEAKER) (test code = 412)                                        

 

             PLATELET COUNT (BEAKER) (test 344 K/CU MM  150-450                 

  



             code = 756)                                         

 

             MEAN PLATELET VOLUME (BEAKER) 11.0 fL      9.4-12.4                

  



             (test code = 754)                                        

 

             NUCLEATED RED BLOOD CELLS 0 /100 WBC   0-0                       



             (BEAKER) (test code = 413)                                        



(CELLAVISION MANUAL DIFF)2020 17:38:00





             Test Item    Value        Reference Range Interpretation Comments

 

             NEUTROPHILS - REL 63 %                                   



             (CELLAVISION)(BEAKER) (test code                                   

     



             = 2816)                                             

 

             LYMPHOCYTES - REL 23 %                                   



             (CELLAVISION)(BEAKER) (test code                                   

     



             = 2817)                                             

 

             MONOCYTES - REL 6 %                                    



             (CELLAVISION)(BEAKER) (test code                                   

     



             = 2818)                                             

 

             EOSINOPHILS - REL 5 %                                    



             (CELLAVISION)(BEAKER) (test code                                   

     



             = 2819)                                             

 

             BASOPHILS - REL 1 %                                    



             (CELLAVISION)(BEAKER) (test code                                   

     



             = 2820)                                             

 

             BANDS - REL (CELLAVISION)(BEAKER) 2 %          0-10                

      



             (test code = 2826)                                        

 

             NEUTROPHILS - ABS 4.91 K/ul    1.78-5.38                 



             (CELLAVISION)(BEAKER) (test code                                   

     



             = 2830)                                             

 

             LYMPHOCYTES - ABS 1.79 K/ul    1.32-3.57                 



             (CELLAVISION)(BEAKER) (test code                                   

     



             = 2831)                                             

 

             MONOCYTES - ABS 0.47 K/uL    0.30-0.82                 



             (CELLAVISION)(BEAKER) (test code                                   

     



             = 2832)                                             

 

             EOSINOPHILS - ABS 0.39 K/uL    0.04-0.54                 



             (CELLAVISION)(BEAKER) (test code                                   

     



             = 2834)                                             

 

             BASOPHILS - ABS 0.08 K/uL    0.01-0.08                 



             (CELLAVISION)(BEAKER) (test code                                   

     



             = 2835)                                             

 

             BANDS - ABS (CELLAVISION)(BEAKER) 0.16 K/uL    0.00-0.80           

      



             (test code = 2840)                                        

 

             TOTAL COUNTED (BEAKER) (test code 100                              

      



             = 1351)                                             

 

             SMUDGE CELLS (BEAKER) (test code Present                           

     



             = 1371)                                             

 

             GIANT PLATELETS (BEAKER) (test Present                             

   



             code = 313)                                         

 

             ANISOCYTOSIS (BEAKER) (test code 1+ few                            

     



             = 961)                                              

 

             POIKILOCYTES (BEAKER) (test code 2+ moderate                       

     



             = 966)                                              

 

             SPHEROCYTES (BEAKER) (test code = 1+ few                           

      



             768)                                                

 

             OVALOCYTES (BEAKER) (test code = 1+ few                            

     



             477)                                                

 

             TEAR DROP CELLS (BEAKER) (test 1+ few                              

   



             code = 481)                                         

 

             ARTIFACT (CELLAVISION)(BEAKER) Present                             

   



             (test code = 3432)                                        

 

             PLATELET CONCENTRATION Adequate                               



             (CELLAVISION)(BEAKER) (test code                                   

     



             = 3438)                                             



Received comment: User comments: Slide comments:POCT-GLUCOSE [id number] 
16:27:00





             Test Item    Value        Reference Range Interpretation Comments

 

             POC-GLUCOSE METER 424 mg/dL           HH           : Notified

 RN/MD:



             (BEAKER) (test code =                                        TESTED

 AT [facility] [address] [address])                                               [facility],



                                                                 [address]:



                                                                 /Techni

christina ID



                                                                 = [id number] for AK

INSONU,



                                                                 LONA



CT, BRAIN, WITHOUT [id number] 15:31:00FINAL REPORT PATIENT ID:   
[id number] CT, BRAIN, WITHOUT CONTRAST CLINICAL INDICATION:  Hydrocephalus C
OMPARISON: None TECHNIQUE:  Noncontrast axial CT imaging of the brain and skull.
 DOSE REDUCTION: Dose modulation, iterative reconstruction, and/or weight-based 
adjustment of the mA/kV was utilized to reduce the radiation dose to as low as 
reasonably achievable. FINDINGS:A total of two ventricular drainage catheters 
are present. A right transverse temporal catheter terminates across the midline 
just above the level of the foramen of Monro. A right parietal approach catheter
terminates in the pineal region. Supratentorial ventricular configuration is 
slitlike. There is no herniation. The basilar cisterns are preserved. There is 
no identifiable recent infarct. 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 Calvarial configuration escape of 
cephalic. Thereis no acute osseous abnormality.  IMPRESSION: Chronic, likely 
congenital parenchymal changes withoutrecent infarct or hemorrhage. A total of 
two ventricular drainage catheters are present. Supratentorial ventricular 
configuration is slitlike and may represent over shunting. Correlation 
recommended. Signed: JR Rae Robert MDReport Verified Date/Time:  
2020 15:31:08 Reading Location: [facility] C0[facility] Neuro Reading Room    
Electronically signed by: ALONDRA RAE on 2020 03:31 PMRAD, 
SHUNT [id number] 15:13:00Reason for exam:-&gt;concern for VPS malfunction
FINAL REPORT PATIENT ID:   [id number] RAD, SHUNT SERIES INDICATION: concern for 
VPS malfunction COMPARISON: None TECHNIQUE: AP and lateral radiographs of the 
skull, abdomen and chest were acquired to evaluate shunt catheter FINDINGS:An 
abandoned shunt catheter is present within the right neck. Medial tothis a 
second shunt catheter is present which descends along the left chest wall, 
terminating withinthe mid pelvis. Radiopaque portions of the catheter appear 
contiguous. Signed: Lisa Tan MDReport Verified Date/Time:  2020 
15:13:54 Reading Location: [facility] Radiology Reading Room  Electronically 
signed by: LIAS TAN MD on 2020 03:13 PMPOCT-GLUCOSE METER
2020 11:38:00





             Test Item    Value        Reference Range Interpretation Comments

 

             POC-GLUCOSE METER 394 mg/dL           H            : TESTED A

[facility] [address]



             [facility] (test code =                                        [facility],



             [address])                                               [id number]:



                                                                 /Techni

christina ID



                                                                 = [id number] for LONA URIOSTEGUI



HEMOGLOBIN [id number] 09:15:00





             Test Item    Value        Reference Range Interpretation Comments

 

             HEMOGLOBIN A1C (BEAKER) (test code = 10.4 %       4.3-6.1      H   

         



             368)                                                



TSH/FREE T4 IF [id number] 07:54:00





             Test Item    Value        Reference Range Interpretation Comments

 

             THYROID STIMULATING HORMONE 1.40 uIU/mL  0.35-4.94                 



             (BEAKER) (test code = 772)                                        



POCT-GLUCOSE [id number] 07:48:00





             Test Item    Value        Reference Range Interpretation Comments

 

             POC-GLUCOSE METER > mg/dL             HH           : Notified

 RN/MD: TESTED



             (BEAKER) (test code =                                        AT [facility] [address] [facility]



             [address]38)                                               [facility], [address]

30:



                                                                 /Techni

christina ID =



                                                                 [id number] for LONA GOLDSMITH



BASIC METABOLIC [id number] 07:44:00





             Test Item    Value        Reference Range Interpretation Comments

 

             SODIUM (BEAKER) 134 meq/L    136-145      L            



             (test code = 381)                                        

 

             POTASSIUM (BEAKER) 4.4 meq/L    3.5-5.1                   



             (test code = 379)                                        

 

             CHLORIDE (BEAKER) 103 meq/L                        



             (test code = 382)                                        

 

             CO2 (BEAKER) (test 20 meq/L     22-29        L            



             code = 355)                                         

 

             BLOOD UREA NITROGEN 17 mg/dL     7-21                      



             (BEAKER) (test code                                        



             = 354)                                              

 

             CREATININE (BEAKER) 1.09 mg/dL   0.57-1.25                 



             (test code = 358)                                        

 

             GLUCOSE RANDOM 464 mg/dL           HH           



             (BEAKER) (test code                                        



             = 652)                                              

 

             CALCIUM (BEAKER) 8.6 mg/dL    8.4-10.2                  



             (test code = 697)                                        

 

             EGFR (BEAKER) (test 94 mL/min/1.73                           ESTIMA

HUMA GFR IS



             code = 1092) sq m                                   NOT AS ACCURATE

 AS



                                                                 CREATININE



                                                                 CLEARANCE IN



                                                                 PREDICTING



                                                                 GLOMERULAR



                                                                 FILTRATION RATE

.



                                                                 ESTIMATED GFR I

S



                                                                 NOT APPLICABLE 

FOR



                                                                 DIALYSIS PATIEN

TS.



LIPID [id number] 07:34:00





             Test Item    Value        Reference Range Interpretation Comments

 

             TRIGLYCERIDES (BEAKER) (test code = 750 mg/dL                      

        



             540)                                                

 

             CHOLESTEROL (BEAKER) (test code = 196 mg/dL                        

      



             631)                                                

 

             HDL CHOLESTEROL (BEAKER) (test code 22 mg/dL                       

        



             = 976)                                              



Calculated LDL not valid if triglyceride &gt;400 mg/dLTriglyceride Reference 
Range:   Low Risk  &lt;150   Borderline    150-199   High Risk     200-499   
Very High Risk  &gt;=500Cholesterol Reference Range:   Low Risk         &lt;200 
 Borderline    200-239    High Risk        &gt;240HDL Cholesterol Reference 
Range:   Low Risk         &gt;=60   High Risk         &lt;40LDL Cholesterol 
ReferenceRange:   Optimal          &lt;100   Near Optimal  100-129   Borderline 
  130-159   High          160-189   Very High       &gt;=190POCT-GLUCOSE METER
2020 00:49:00





             Test Item    Value        Reference Range Interpretation Comments

 

             POC-GLUCOSE METER 399 mg/dL           H            : TESTED A

T [facility] [address]



             [facility] (test code =                                        [place],



             1538)                                               [id number]:



                                                                 /Techni

christina ID



                                                                 = [id number] for MALLIKA

SHARONDACLAY WILSON



RAD, FOOT, 2 VIEWS, [id number] 22:28:00Reason for exam:-&gt;osteomyletitis
FINAL REPORT PATIENT ID:   [id number] TECHNIQUE: Two views each of the bilateral 
feet HISTORY: osteomyletitis. COMPARISON: None. IMPRESSION:No acute displaced 
fracture or dislocation. Joint spaces are within normal limits.Severe soft 
tissue swelling.On the right subcentimeter nonspecific calcifications adjacent 
to the heel plantar aspect. Correlate with physical exam. Severely limited exam 
due to patient body habitus. No definite cortical irregularity.There is clinical
concern for osteomyelitis, recommend MRI with contrast. Signed: Sriram Townsendeport Verified Date/Time:  2020 22:28:25 Reading Location: [address]
Consult Reading Room  Electronically signed by: SRIRAM TOWNSEND DO on 
2020 10:28 PMRAD, FOOT, 2 VIEWS, [id number] 22:28:00Reason for 
exam:-&gt;osteomyletitsFINAL REPORT PATIENT ID:   [id number] TECHNIQUE: Two views 
each of the bilateral feet HISTORY: osteomyletitis. COMPARISON: None. 
IMPRESSION:No acute displaced fracture or dislocation. Joint spaces are within 
normal limits.Severe soft tissue swelling.On the right subcentimeter nonspecific
calcifications adjacent to the heel plantar aspect. Correlate with physical 
exam. Severely limited exam due to patient body habitus. No definite cortical 
irregularity.There is clinical concern for osteomyelitis, recommend MRI with 
contrast. Signed: Sriram Townsend Verified Date/Time:  2020 
22:28:25 Reading Location: [facility] C0[facility] Consult Reading Room  Electronically 
signed by: SRIRAM TOWNSEND DO on 2020 10:28 PMPOCT-GLUCOSE METER
2020 21:04:00





             Test Item    Value        Reference Range Interpretation Comments

 

             POC-GLUCOSE METER 430 mg/dL           HH           : Notified

 RN/MD:



             (KUN) (test code =                                        TESTED

 AT [facility] [address]



             1538)                                               [facility],



                                                                 [id number]:



                                                                 /Techni

christina ID



                                                                 = [id number] for DEYSI ADRIAN



ERTAPENEM:SUSC:PT:ISOLATE:ORDQN:[id number] 16:48:00





             Test Item    Value        Reference Range Interpretation Comments

 

             Culture: Urine (test >100,000 CFU/mL Proteus                       

    



             code = Culture: mirabilis 10,000 -                           



             Urine)       50,000 CFU/mL Skin Jaime                           



[facility]ERT[facility]SUSC:PT:ISOLATE:ORDQN:[id number] 16:48:00





             Test Item    Value        Reference Range Interpretation Comments

 

             Proteus mirabilis (test Proteus mirabilis                          

 



             code = Proteus mirabilis)                                        



[facility] AND [id number] 16:48:00





             Test Item    Value        Reference Range Interpretation Comments

 

             UA Nitrite (test code Negative (18 10:48                      

     



             = UA Nitrite) AM)                                    



[facility] AND [id number] 16:48:00





             Test Item    Value        Reference Range Interpretation Comments

 

             UA Bili (test code = Negative *NA*(18                         

  



             UA Bili)     10:48 AM)                              



[facility] AND [id number] 16:48:00





             Test Item    Value        Reference Range Interpretation Comments

 

             UA Ketones (test code Negative *NA*(18                        

   



             = UA Ketones) 10:48 AM)                              



[facility] AND [id number] 16:48:00





             Test Item    Value        Reference Range Interpretation Comments

 

             UA Blood (test code = Trace *ABN*(18                          

 



             UA Blood)    10:48 AM)                              



[facility] AND [id number] 16:48:00





             Test Item    Value        Reference Range Interpretation Comments

 

             UA Urobilinogen (test code = UA 0.2          0.1-1.0               

    



             Urobilinogen)                                        



[facility] AND [id number] 16:48:00





             Test Item    Value        Reference Range Interpretation Comments

 

             UA Leuk Est (test code Large *ABN*(18                         

  



             = UA Leuk Est) 10:48 AM)                              



[facility] AND [id number] 16:48:00





             Test Item    Value        Reference Range Interpretation Comments

 

             UA Protein (test code Negative (18 10:48                      

     



             = UA Protein) AM)                                    



[facility] AND [id number] 16:48:00





             Test Item    Value        Reference Range Interpretation Comments

 

             UA Glucose (test code Negative (18 10:48                      

     



             = UA Glucose) AM)                                    



[facility] AND [id number] 16:48:00





             Test Item    Value        Reference Range Interpretation Comments

 

             UA pH (test code = UA pH) 7.0 1        5.0-8.0                   



[facility] AND [id number] 16:48:00





             Test Item    Value        Reference Range Interpretation Comments

 

             UA Spec Grav (test code = UA Spec 1.015 1                          

      



             Grav)                                               



[facility] AND [id number] 16:48:00





             Test Item    Value        Reference Range Interpretation Comments

 

             UA Color (test code = Yellow *NA*(18                          

 



             UA Color)    10:48 AM)                              



[facility] AND [id number] 16:48:00





             Test Item    Value        Reference Range Interpretation Comments

 

             UA Turbidity (test code = Clear (18 10:48                     

      



             UA Turbidity) AM)                                    



[facility] AND [id number] 16:48:00





             Test Item    Value        Reference Range Interpretation Comments

 

             UA Mucus (test code = UA Mucus) Few /LPF                           

    



[facility] [id number] 16:48:00





             Test Item    Value        Reference Range Interpretation Comments

 

             UA Bacteria (test code = UA Few /HPF                               



             Bacteria)                                           



[facility] [id number] 16:48:00





             Test Item    Value        Reference Range Interpretation Comments

 

             UA RBC (test code = 0-2 /HPF     See_Comment                [Automa

huma message] The



             UA RBC)                                             system which ge

nerated



                                                                 this result tra

nsmitted



                                                                 reference range

: <=2. The



                                                                 reference range

 was not



                                                                 used to interpr

et this



                                                                 result as zayda

l/abnormal.



[facility] AND [id number] 16:48:00





             Test Item    Value        Reference Range Interpretation Comments

 

             UA Sq Epi (test code = None Seen (18                          

 



             UA Sq Epi)   10:48 AM)                              



[facility] AND [id number] 16:48:00





             Test Item    Value        Reference Range Interpretation Comments

 

             UA WBC (test code = UA WBC)  /HPF                            



[facility] [id number] 11:57:00





             Test Item    Value        Reference Range Interpretation Comments

 

             Antibody Scrn (test Negative (18 5:57                         

  



             code = Antibody Scrn) AM)                                    



[facility] [id number] 11:57:00





             Test Item    Value        Reference Range Interpretation Comments

 

             ABO/Rh (test code = ABO/Rh) AB POS                                 



[id number]57[facility]





             Test Item    Value        Reference Range Interpretation Comments

 

             PTT (test code = PTT) 33.4 s       22.9-35.8                 



[id number] 11:57:00





             Test Item    Value        Reference Range Interpretation Comments

 

             PT (test code = PT) 13.7 s       12.0-14.7                 



[facility]FsbyrbiKOGLSWLKVY1558-07-48 11:57:00





             Test Item    Value        Reference Range Interpretation Comments

 

             INR (test code = INR) 1.05 1       0.85-1.17                 



[id number] 10:50:01





             Test Item    Value        Reference Range Interpretation Comments

 

             RDW (test code = RDW) 18.9         11.5-14.5                 



[facility]rapevi[id number] 10:50:01





             Test Item    Value        Reference Range Interpretation Comments

 

             Hct (test code = Hct) 43.3         42.0-54.0                 



[facility]rapevineYtipjqbZQJTRUOCGB3543-63-70 10:50:01





             Test Item    Value        Reference Range Interpretation Comments

 

             WBC (test code = WBC) 9.3          3.7-10.4                  



[facility]rapevineDmpgeceIEEVVKLEAZ7525-20-91 10:50:01





             Test Item    Value        Reference Range Interpretation Comments

 

             Hgb (test code = Hgb) 14.7         14.0-18.0                 



[facility] GrapevineZauiliaDJBOVDMUKV2026-67-57 10:50:01





             Test Item    Value        Reference Range Interpretation Comments

 

             RBC (test code = RBC) 5.36         4.70-6.10                 



[facility]r[id number] 10:50:01





             Test Item    Value        Reference Range Interpretation Comments

 

             Eosinophils # (test code 0.2          See_Comment                [A

utomated message] The



             = Eosinophils #)                                        system whic

h generated



                                                                 this result tra

nsmitted



                                                                 reference range

: <=0.5.



                                                                 The reference r

zhen was



                                                                 not used to int

erpret



                                                                 this result as



                                                                 normal/abnormal

.



[facility] Gr[id number] 10:50:01





             Test Item    Value        Reference Range Interpretation Comments

 

             Basophils # (test code 0.1          See_Comment                [Aut

omated message] The



             = Basophils #)                                        system which 

generated



                                                                 this result tra

nsmitted



                                                                 reference range

: <=0.2.



                                                                 The reference r

zhen was



                                                                 not used to int

erpret



                                                                 this result as



                                                                 normal/abnormal

.



[facility] GrapevineAdcylayTURKLKGHXU3293-12-75 10:50:01





             Test Item    Value        Reference Range Interpretation Comments

 

             Lymphocytes # (test code = Lymphocytes 1.8          1.0-5.5        

           



             #)                                                  



[facility]r[id number] 10:50:01





             Test Item    Value        Reference Range Interpretation Comments

 

             Monocytes # (test code 0.9          See_Comment                [Aut

omated message] The



             = Monocytes #)                                        system which 

generated



                                                                 this result tra

nsmitted



                                                                 reference range

: <=0.8.



                                                                 The reference r

zhen was



                                                                 not used to int

erpret



                                                                 this result as



                                                                 normal/abnormal

.



[facility] Grapevi[id number] 10:50:01





             Test Item    Value        Reference Range Interpretation Comments

 

             Neutrophils # (test code = Neutrophils 6.3          1.5-8.1        

           



             #)                                                  



[facility] Grapevi[id number] 10:50:01





             Test Item    Value        Reference Range Interpretation Comments

 

             Eosinophils (test code = 2.0          See_Comment                [A

utomated message] The



             Eosinophils)                                        system which ge

nerated



                                                                 this result tra

nsmitted



                                                                 reference range

: <=4.0.



                                                                 The reference r

zhen was



                                                                 not used to int

erpret



                                                                 this result as



                                                                 normal/abnormal

.



[facility] Gr[id number] 10:50:01





             Test Item    Value        Reference Range Interpretation Comments

 

             Segs (test code = Segs) 67.4         45.0-75.0                 



[facility] GrapevineMshhnvgNDIBFREXWT7035-57-27 10:50:01





             Test Item    Value        Reference Range Interpretation Comments

 

             Lymphocytes (test code = Lymphocytes) 19.9         20.0-40.0       

          



[facility] GrapevineMahaccwVMHLFRYDSW0908-64-38 10:50:01





             Test Item    Value        Reference Range Interpretation Comments

 

             Basophils (test code = 1.0          See_Comment                [Aut

omated message] The



             Basophils)                                          system which ge

nerated



                                                                 this result tra

nsmitted



                                                                 reference range

: <=1.0.



                                                                 The reference r

zhen was



                                                                 not used to int

erpret



                                                                 this result as



                                                                 normal/abnormal

.



[facility] Gr[id number] 10:50:01





             Test Item    Value        Reference Range Interpretation Comments

 

             Monocytes (test code = Monocytes) 9.7          2.0-12.0            

      



[facility]2018-11-08 10:50:01





             Test Item    Value        Reference Range Interpretation Comments

 

             eGFR (test code = eGFR) 133                                    



[facility]2018-11-08 10:50:01





             Test Item    Value        Reference Range Interpretation Comments

 

             Calcium Lvl (test code = Calcium Lvl) 9.4          8.5-10.5        

          



[facility]2018-11-08 10:50:01





             Test Item    Value        Reference Range Interpretation Comments

 

             CO2 (test code = CO2) 28           24-32                     



[facility]2018-11-08 10:50:01





             Test Item    Value        Reference Range Interpretation Comments

 

             BUN (test code = BUN) 14           7-22                      



[facility]2018-11-08 10:50:01





             Test Item    Value        Reference Range Interpretation Comments

 

             Glucose Lvl (test code = Glucose Lvl) 89           70-99           

          



[facility]2018-11-08 10:50:01





             Test Item    Value        Reference Range Interpretation Comments

 

             Chloride Lvl (test code = Chloride Lvl) 104                  

            



[facility]2018-11-08 10:50:01





             Test Item    Value        Reference Range Interpretation Comments

 

             Potassium Lvl (test code = Potassium 4.2          3.5-5.1          

         



             Lvl)                                                



[facility]2018-11-08 10:50:01





             Test Item    Value        Reference Range Interpretation Comments

 

             Sodium Lvl (test code = Sodium Lvl) 137          135-145           

        



[facility]2018-11-08 10:50:01





             Test Item    Value        Reference Range Interpretation Comments

 

             Creatinine Lvl (test code = Creatinine 0.85         0.50-1.40      

           



             Lvl)                                                



[facility]2018-11-08 10:50:01





             Test Item    Value        Reference Range Interpretation Comments

 

             AGAP (test code = AGAP) 9.2          10.0-20.0                 



[facility] GrapevineDlizjamBQSYZWGPYX1158-95-30 10:50:01





             Test Item    Value        Reference Range Interpretation Comments

 

             ACT (TEG) Rapid (test code = ACT (TEG) 136 s                 

           



             Rapid)                                              



[facility] GrapevineZldsndzIBJCNDIBRJ9391-79-55 10:50:01





             Test Item    Value        Reference Range Interpretation Comments

 

             Split Point Rapid (test code = Split 0.6 min                       

         



             Point Rapid)                                        



[facility] Grapevi[id number] 10:50:01





             Test Item    Value        Reference Range Interpretation Comments

 

             R-time Rapid (test code = R-time 0.9 min      0.4-0.7              

     



             Rapid)                                              



[facility] GrapevineQwhfzjoAWMVDVLWPW9463-59-67 10:50:01





             Test Item    Value        Reference Range Interpretation Comments

 

             K-time Rapid (test code = K-time 1.4 min      0.6-2.3              

     



             Rapid)                                              



[facility] GrapevineWsoxrhwKMDEHVGIRC5267-21-93 10:50:01





             Test Item    Value        Reference Range Interpretation Comments

 

             Angle Rapid (test code = Angle 71 degrees   64-80                  

   



             Rapid)                                              



[facility] GrapevineDftfsvmEMFPQNRNGR1961-68-44 10:50:01





             Test Item    Value        Reference Range Interpretation Comments

 

             G-value Rapid (test code = G-value 12.7         5.0-11.6           

       



             Rapid)                                              



[facility] GrapevineQwrpzzxKKJGEWOFMO7442-57-41 10:50:01





             Test Item    Value        Reference Range Interpretation Comments

 

             Max Amplitude Rapid (test code = Max 72 mm        52-71            

         



             Amplitude Rapid)                                        



[facility]rapevineZgglhfeZMLFRERMJY7871-56-35 10:50:01





             Test Item    Value        Reference Range Interpretation Comments

 

             Estimated % Lysis Rapid 0.1          See_Comment                [Au

tomated message] The



             (test code = Estimated                                        syste

m which generated



             % Lysis Rapid)                                        this result t

ransmitted



                                                                 reference range

: <=7.5.



                                                                 The reference r

zhen was



                                                                 not used to int

erpret



                                                                 this result as



                                                                 normal/abnormal

.



[facility]vineOpmwekrFIVDNANCOU1987-17-87 10:50:01





             Test Item    Value        Reference Range Interpretation Comments

 

             Platelet (test code = Platelet) 367          133-450               

    



[facility]rapevineNjxuaelRBYXSRLZRN0458-11-37 10:50:01





             Test Item    Value        Reference Range Interpretation Comments

 

             MPV (test code = MPV) 7.8          7.4-10.4                  



[facility] GrapevineCmsdfwsYPTYJCJVML6882-83-14 10:50:01





             Test Item    Value        Reference Range Interpretation Comments

 

             MCH (test code = MCH) 27.4 pg      27.0-31.0                 



[facility]rapevineHunjtybOYZRLTRFMG8417-14-71 10:50:01





             Test Item    Value        Reference Range Interpretation Comments

 

             MCV (test code = MCV) 80.7         80.0-94.0                 



[facility] GrapevineAmiarwuRHILERLMXL6184-53-90 10:50:01





             Test Item    Value        Reference Range Interpretation Comments

 

             MCHC (test code = MCHC) 34.0         32.0-36.0                 



[facility]2018-05-08 05:42:00





             Test Item    Value        Reference Range Interpretation Comments

 

             B/C Ratio (test code = B/C Ratio) 17 1         6-25                

      



[facility]2018-05-08 05:42:00





             Test Item    Value        Reference Range Interpretation Comments

 

             Globulin (test code = Globulin) 4.3          2.7-4.2               

    



[facility]2018-05-08 05:42:00





             Test Item    Value        Reference Range Interpretation Comments

 

             A/G Ratio (test code = A/G Ratio) 0.7 1        0.7-1.6             

      



[facility]2018-05-08 05:42:00





             Test Item    Value        Reference Range Interpretation Comments

 

             AGAP (test code = AGAP) 14.4         10.0-20.0                 



[facility]2018-05-08 05:42:00





             Test Item    Value        Reference Range Interpretation Comments

 

             eGFR (test code = eGFR) 113                                    



[facility]2018-05-08 05:42:00





             Test Item    Value        Reference Range Interpretation Comments

 

             Alk Phos (test code = Alk Phos) 76                           

    



[facility]2018-05-08 05:42:00





             Test Item    Value        Reference Range Interpretation Comments

 

             ALT (test code = ALT) 35           See_Comment                [Auto

mated message] The



                                                                 system which ge

nerated this



                                                                 result transmit

huma



                                                                 reference range

: <=65. The



                                                                 reference range

 was not



                                                                 used to interpr

et this



                                                                 result as zayda

l/abnormal.



[facility]2018-05-08 05:42:00





             Test Item    Value        Reference Range Interpretation Comments

 

             Albumin Lvl (test code = Albumin Lvl) 2.8          3.5-5.0         

          



[facility]2018-05-08 05:42:00





             Test Item    Value        Reference Range Interpretation Comments

 

             Total Protein (test code = Total 7.1          6.4-8.4              

     



             Protein)                                            



[facility]2018-05-08 05:42:00





             Test Item    Value        Reference Range Interpretation Comments

 

             Calcium Lvl (test code = Calcium Lvl) 8.7          8.5-10.5        

          



[facility]2018-05-08 05:42:00





             Test Item    Value        Reference Range Interpretation Comments

 

             AST (test code = AST) 18           See_Comment                [Auto

mated message] The



                                                                 system which ge

nerated this



                                                                 result transmit

huma



                                                                 reference range

: <=37. The



                                                                 reference range

 was not



                                                                 used to interpr

et this



                                                                 result as zayda

l/abnormal.



[facility]2018-05-08 05:42:00





             Test Item    Value        Reference Range Interpretation Comments

 

             Bili Total (test code = Bili Total) 0.3          0.2-1.3           

        



[facility]2018-05-08 05:42:00





             Test Item    Value        Reference Range Interpretation Comments

 

             Potassium Lvl (test code = Potassium 4.4          3.5-5.1          

         



             Lvl)                                                



[facility]2018-05-08 05:42:00





             Test Item    Value        Reference Range Interpretation Comments

 

             Chloride Lvl (test code = Chloride Lvl) 109                  

            



[facility]2018-05-08 05:42:00





             Test Item    Value        Reference Range Interpretation Comments

 

             CO2 (test code = CO2) 23           24-32                     



[facility]2018-05-08 05:42:00





             Test Item    Value        Reference Range Interpretation Comments

 

             Glucose Lvl (test code = Glucose Lvl) 114          70-99           

          



[facility]2018-05-08 05:42:00





             Test Item    Value        Reference Range Interpretation Comments

 

             Creatinine Lvl (test code = Creatinine 1.01         0.50-1.40      

           



             Lvl)                                                



[facility]2018-05-08 05:42:00





             Test Item    Value        Reference Range Interpretation Comments

 

             BUN (test code = BUN) 17           7-22                      



[facility]2018-05-08 05:42:00





             Test Item    Value        Reference Range Interpretation Comments

 

             Sodium Lvl (test code = Sodium Lvl) 142          135-145           

        



[facility] GrapevineNjeueeeVAHCHGMKMX7073-87-11 05:42:00





             Test Item    Value        Reference Range Interpretation Comments

 

             Basophils (test code = 0.6          See_Comment                [Aut

omated message] The



             Basophils)                                          system which ge

nerated



                                                                 this result tra

nsmitted



                                                                 reference range

: <=1.0.



                                                                 The reference r

zhen was



                                                                 not used to int

erpret



                                                                 this result as



                                                                 normal/abnormal

.



[facility]rapevineKlbdxrmKNKYSOQCBL0318-31-15 05:42:00





             Test Item    Value        Reference Range Interpretation Comments

 

             Segs-Bands # (test code = Segs-Bands #) 4.8          1.5-8.1       

            



[facility] GrapevineYnrwyapBKJFTFURZD4809-40-98 05:42:00





             Test Item    Value        Reference Range Interpretation Comments

 

             Monocytes # (test code 0.7          See_Comment                [Aut

omated message] The



             = Monocytes #)                                        system which 

generated



                                                                 this result tra

nsmitted



                                                                 reference range

: <=0.8.



                                                                 The reference r

zhen was



                                                                 not used to int

erpret



                                                                 this result as



                                                                 normal/abnormal

.



[facility] GrapevineBsgnpdnEEUQSCVLGP5089-91-85 05:42:00





             Test Item    Value        Reference Range Interpretation Comments

 

             Lymphocytes # (test code = Lymphocytes 1.9          1.0-5.5        

           



             #)                                                  



[facility] GrapevineCfaekohBNRNAMSQIY3146-53-07 05:42:00





             Test Item    Value        Reference Range Interpretation Comments

 

             Monocytes (test code = Monocytes) 9.4          2.0-12.0            

      



[facility] GrapevineMrazgiiZEEFAGVMIU1187-48-94 05:42:00





             Test Item    Value        Reference Range Interpretation Comments

 

             Eosinophils # (test code 0.2          See_Comment                [A

utomated message] The



             = Eosinophils #)                                        system whic

h generated



                                                                 this result tra

nsmitted



                                                                 reference range

: <=0.5.



                                                                 The reference r

zhen was



                                                                 not used to int

erpret



                                                                 this result as



                                                                 normal/abnormal

.



[facility]rapevineGcojyppUWLQHMFKOL8953-02-63 05:42:00





             Test Item    Value        Reference Range Interpretation Comments

 

             Eosinophils (test code = 2.9          See_Comment                [A

utomated message] The



             Eosinophils)                                        system which ge

nerated



                                                                 this result tra

nsmitted



                                                                 reference range

: <=4.0.



                                                                 The reference r

zhen was



                                                                 not used to int

erpret



                                                                 this result as



                                                                 normal/abnormal

.



[facility] GrapevineBcnvvutILFPZCRDAP8880-33-13 05:42:00





             Test Item    Value        Reference Range Interpretation Comments

 

             Segs (test code = Segs) 62.2         45.0-75.0                 



[facility]rapevinePmlhjyeGNWUNWYKSI3700-13-68 05:42:00





             Test Item    Value        Reference Range Interpretation Comments

 

             Lymphocytes (test code = Lymphocytes) 24.9         20.0-40.0       

          



[facility] GrapevineOzekvkmTYVEXTMIQV8544-48-65 05:42:00





             Test Item    Value        Reference Range Interpretation Comments

 

             MCH (test code = MCH) 27.5 pg      27.0-31.0                 



[facility]rapevineElwyxywPSZNRFPAQW0811-64-43 05:42:00





             Test Item    Value        Reference Range Interpretation Comments

 

             MCV (test code = MCV) 85.3         80.0-94.0                 



[facility] GrapevineDjkulmzUGTZCESXXK7233-24-51 05:42:00





             Test Item    Value        Reference Range Interpretation Comments

 

             Hct (test code = Hct) 43.9         42.0-54.0                 



[facility] GrapevineJxhpnrsSNJLNWECNF7457-29-94 05:42:00





             Test Item    Value        Reference Range Interpretation Comments

 

             Hgb (test code = Hgb) 14.2         14.0-18.0                 



[facility] GrapevineKhjiowdLEKSLAKYWW8082-32-81 05:42:00





             Test Item    Value        Reference Range Interpretation Comments

 

             WBC (test code = WBC) 7.7          3.7-10.4                  



[facility] GrapevineUgvrgceUGHHZYNVUC2916-93-59 05:42:00





             Test Item    Value        Reference Range Interpretation Comments

 

             RBC (test code = RBC) 5.15         4.70-6.10                 



[facility] GrapevineVqtvbtwEYBIIIRLPS1846-84-38 05:42:00





             Test Item    Value        Reference Range Interpretation Comments

 

             MPV (test code = MPV) 8.4          7.4-10.4                  



[place] 048358-05-08 05:42:00





             Test Item    Value        Reference Range Interpretation Comments

 

             MCHC (test code = MCHC) 32.3         32.0-36.0                 



[facility] GrapevineZlnklwtJDOGXBMZWQ7598-26-27 05:42:00





             Test Item    Value        Reference Range Interpretation Comments

 

             RDW (test code = RDW) 17.3         11.5-14.5                 



[facility] GrapevineTqratrfXMOFQCAAIB3403-76-65 05:42:00





             Test Item    Value        Reference Range Interpretation Comments

 

             Platelet (test code = Platelet) 317          133-450               

    



[facility]2018-05-07 09:36:00





             Test Item    Value        Reference Range Interpretation Comments

 

             Globulin (test code = Globulin) 4.4          2.7-4.2               

    



[facility]2018-05-07 09:36:00





             Test Item    Value        Reference Range Interpretation Comments

 

             A/G Ratio (test code = A/G Ratio) 0.6 1        0.7-1.6             

      



[facility]2018-05-07 09:36:00





             Test Item    Value        Reference Range Interpretation Comments

 

             B/C Ratio (test code = B/C Ratio) 17 1         6-25                

      



[facility]2018-05-07 09:36:00





             Test Item    Value        Reference Range Interpretation Comments

 

             AGAP (test code = AGAP) 11.3         10.0-20.0                 



[facility]2018-05-07 09:36:00





             Test Item    Value        Reference Range Interpretation Comments

 

             eGFR (test code = eGFR) 134                                    



[facility]2018-05-07 09:36:00





             Test Item    Value        Reference Range Interpretation Comments

 

             Creatinine Lvl (test code = Creatinine 0.84         0.50-1.40      

           



             Lvl)                                                



[facility]2018-05-07 09:36:00





             Test Item    Value        Reference Range Interpretation Comments

 

             Sodium Lvl (test code = Sodium Lvl) 142          135-145           

        



[facility]2018-05-07 09:36:00





             Test Item    Value        Reference Range Interpretation Comments

 

             Glucose Lvl (test code = Glucose Lvl) 99           70-99           

          



[facility]2018-05-07 09:36:00





             Test Item    Value        Reference Range Interpretation Comments

 

             BUN (test code = BUN) 14           7-22                      



[facility]2018-05-07 09:36:00





             Test Item    Value        Reference Range Interpretation Comments

 

             Alk Phos (test code = Alk Phos) 79                           

    



[facility]2018-05-07 09:36:00





             Test Item    Value        Reference Range Interpretation Comments

 

             Bili Total (test code = Bili Total) 0.3          0.2-1.3           

        



[facility]2018-05-07 09:36:00





             Test Item    Value        Reference Range Interpretation Comments

 

             AST (test code = AST) 14           See_Comment                [Auto

mated message] The



                                                                 system which ge

nerated this



                                                                 result transmit

huma



                                                                 reference range

: <=37. The



                                                                 reference range

 was not



                                                                 used to interpr

et this



                                                                 result as zayda

l/abnormal.



[place] 307108-05-07 09:36:00





             Test Item    Value        Reference Range Interpretation Comments

 

             ALT (test code = ALT) 43           See_Comment                [Auto

mated message] The



                                                                 system which ge

nerated this



                                                                 result transmit

huma



                                                                 reference range

: <=65. The



                                                                 reference range

 was not



                                                                 used to interpr

et this



                                                                 result as zayda

l/abnormal.



[facility]2018-05-07 09:36:00





             Test Item    Value        Reference Range Interpretation Comments

 

             Total Protein (test code = Total 7.1          6.4-8.4              

     



             Protein)                                            



[place] 307108-05-07 09:36:00





             Test Item    Value        Reference Range Interpretation Comments

 

             Albumin Lvl (test code = Albumin Lvl) 2.7          3.5-5.0         

          



[place] 307108-05-07 09:36:00





             Test Item    Value        Reference Range Interpretation Comments

 

             Calcium Lvl (test code = Calcium Lvl) 9.2          8.5-10.5        

          



[facility]2018-05-07 09:36:00





             Test Item    Value        Reference Range Interpretation Comments

 

             CO2 (test code = CO2) 21           24-32                     



[place] 307108-05-07 09:36:00





             Test Item    Value        Reference Range Interpretation Comments

 

             Potassium Lvl (test code = Potassium 4.3          3.5-5.1          

         



             Lvl)                                                



[facility]2018-05-07 09:36:00





             Test Item    Value        Reference Range Interpretation Comments

 

             Chloride Lvl (test code = Chloride Lvl) 114                  

            



[facility]rapevineKqarutdUWTCLZRSBC9783-48-53 09:36:00





             Test Item    Value        Reference Range Interpretation Comments

 

             MCHC (test code = MCHC) 32.5         32.0-36.0                 



[place] 048358-05-07 09:36:00





             Test Item    Value        Reference Range Interpretation Comments

 

             RDW (test code = RDW) 17.5         11.5-14.5                 



[facility]rapevineJgkgeslZNGKBBSVQP7691-23-78 09:36:00





             Test Item    Value        Reference Range Interpretation Comments

 

             Platelet (test code = Platelet) 400          133-450               

    



[facility] GrapevineRibanobRVVURJATEY7342-41-51 09:36:00





             Test Item    Value        Reference Range Interpretation Comments

 

             MPV (test code = MPV) 8.5          7.4-10.4                  



[facility] GrapevineRywbadgSWJULDQCUH6394-76-40 09:36:00





             Test Item    Value        Reference Range Interpretation Comments

 

             WBC (test code = WBC) 6.6          3.7-10.4                  



[facility] GrapevineOjselmuEDXOMKJCBE1565-02-77 09:36:00





             Test Item    Value        Reference Range Interpretation Comments

 

             RBC (test code = RBC) 5.17         4.70-6.10                 



[facility] GrapevineGbrldzgDTFODHXHCX3477-75-73 09:36:00





             Test Item    Value        Reference Range Interpretation Comments

 

             MCV (test code = MCV) 86.1         80.0-94.0                 



[facility]rapevineMgcezhmRNVBLPGXJQ8277-29-72 09:36:00





             Test Item    Value        Reference Range Interpretation Comments

 

             Hct (test code = Hct) 44.5         42.0-54.0                 



[facility] GrapevineEcuaqexESSYKPWKCS1225-49-56 09:36:00





             Test Item    Value        Reference Range Interpretation Comments

 

             MCH (test code = MCH) 28.0 pg      27.0-31.0                 



[facility] GrapevineLhcruaeBSUBRKUBEN7372-09-87 09:36:00





             Test Item    Value        Reference Range Interpretation Comments

 

             Hgb (test code = Hgb) 14.5         14.0-18.0                 



[facility] GrapevineSldkyhpCTBJEJETFD5949-89-52 09:36:00





             Test Item    Value        Reference Range Interpretation Comments

 

             Lymphocytes # (test code = Lymphocytes 1.7          1.0-5.5        

           



             #)                                                  



[facility] GrapevineVdqhfcsVKETJCMPRY1943-71-84 09:36:00





             Test Item    Value        Reference Range Interpretation Comments

 

             Monocytes # (test code 0.7          See_Comment                [Aut

omated message] The



             = Monocytes #)                                        system which 

generated



                                                                 this result tra

nsmitted



                                                                 reference range

: <=0.8.



                                                                 The reference r

zhen was



                                                                 not used to int

erpret



                                                                 this result as



                                                                 normal/abnormal

.



[facility] GrapevineIiscawsLCHISUDESP6002-48-59 09:36:00





             Test Item    Value        Reference Range Interpretation Comments

 

             Eosinophils # (test code 0.2          See_Comment                [A

utomated message] The



             = Eosinophils #)                                        system whic

h generated



                                                                 this result tra

nsmitted



                                                                 reference range

: <=0.5.



                                                                 The reference r

zhen was



                                                                 not used to int

erpret



                                                                 this result as



                                                                 normal/abnormal

.



[facility]vineAipiyxtMHUVNXDFAZ0714-40-48 09:36:00





             Test Item    Value        Reference Range Interpretation Comments

 

             Lymphocytes (test code = Lymphocytes) 26.1         20.0-40.0       

          



[facility]vineWoyxethCQACREMAJE0520-73-35 09:36:00





             Test Item    Value        Reference Range Interpretation Comments

 

             Segs (test code = Segs) 59.3         45.0-75.0                 



[facility]vineHjndtwrYKYAROWXDO8896-62-23 09:36:00





             Test Item    Value        Reference Range Interpretation Comments

 

             Basophils (test code = 0.8          See_Comment                [Aut

omated message] The



             Basophils)                                          system which ge

nerated



                                                                 this result tra

nsmitted



                                                                 reference range

: <=1.0.



                                                                 The reference r

zhen was



                                                                 not used to int

erpret



                                                                 this result as



                                                                 normal/abnormal

.



[facility]rapevineUtzqogsIGGHYZAHPT4466-23-00 09:36:00





             Test Item    Value        Reference Range Interpretation Comments

 

             Monocytes (test code = Monocytes) 10.5         2.0-12.0            

      



[facility]rapevineOyputkdNYCTLQMIIE2655-15-20 09:36:00





             Test Item    Value        Reference Range Interpretation Comments

 

             Eosinophils (test code = 3.3          See_Comment                [A

utomated message] The



             Eosinophils)                                        system which ge

nerated



                                                                 this result tra

nsmitted



                                                                 reference range

: <=4.0.



                                                                 The reference r

zhen was



                                                                 not used to int

erpret



                                                                 this result as



                                                                 normal/abnormal

.



[facility]rapevineMkolcsiYASADXDVYJ5077-19-10 09:36:00





             Test Item    Value        Reference Range Interpretation Comments

 

             Segs-Bands # (test code = Segs-Bands #) 3.9          1.5-8.1       

            



[facility] AreaPhubdihPLGADYXORX0188-71-85 21:02:00





             Test Item    Value        Reference Range Interpretation Comments

 

             Vanco Tr TND (test code = Vanco Tr 15:30pm                         

       



             TND)                                                



[place] [id number] 21:02:00





             Test Item    Value        Reference Range Interpretation Comments

 

             Vanco Tr (test code = Vanco Tr) 9.1                                

    



[facility]2018-05-06 07:07:00





             Test Item    Value        Reference Range Interpretation Comments

 

             Glucose Lvl (test code = Glucose Lvl) 74           70-99           

          



[place] 307108-05-06 07:07:00





             Test Item    Value        Reference Range Interpretation Comments

 

             BUN (test code = BUN) 12           7-22                      



[place] 307108-05-06 07:07:00





             Test Item    Value        Reference Range Interpretation Comments

 

             Creatinine Lvl (test code = Creatinine 0.75         0.50-1.40      

           



             Lvl)                                                



[place] 307108-05-06 07:07:00





             Test Item    Value        Reference Range Interpretation Comments

 

             eGFR (test code = eGFR) 140                                    



[place] 307108-05-06 07:07:00





             Test Item    Value        Reference Range Interpretation Comments

 

             Albumin Lvl (test code = Albumin Lvl) 2.9          3.5-5.0         

          



[place] 307108-05-06 07:07:00





             Test Item    Value        Reference Range Interpretation Comments

 

             Globulin (test code = Globulin) 4.4          2.7-4.2               

    



[place] 307108-05-06 07:07:00





             Test Item    Value        Reference Range Interpretation Comments

 

             A/G Ratio (test code = A/G Ratio) 0.7 1        0.7-1.6             

      



[place] 307108-05-06 07:07:00





             Test Item    Value        Reference Range Interpretation Comments

 

             Bili Total (test code = Bili Total) 0.4          0.2-1.3           

        



[place] 307108-05-06 07:07:00





             Test Item    Value        Reference Range Interpretation Comments

 

             Alk Phos (test code = Alk Phos) 71                           

    



[place] 307108-05-06 07:07:00





             Test Item    Value        Reference Range Interpretation Comments

 

             AST (test code = AST) 15           See_Comment                [Auto

mated message] The



                                                                 system which ge

nerated this



                                                                 result transmit

huma



                                                                 reference range

: <=37. The



                                                                 reference range

 was not



                                                                 used to interpr

et this



                                                                 result as zayda

l/abnormal.



[place] 307108-05-06 07:07:00





             Test Item    Value        Reference Range Interpretation Comments

 

             ALT (test code = ALT) 28           See_Comment                [Auto

mated message] The



                                                                 system which ge

nerated this



                                                                 result transmit

huma



                                                                 reference range

: <=65. The



                                                                 reference range

 was not



                                                                 used to interpr

et this



                                                                 result as zayda

l/abnormal.



[place] [address]-05-06 07:07:00





             Test Item    Value        Reference Range Interpretation Comments

 

             Potassium Lvl (test code = Potassium 4.4          3.5-5.1          

         



             Lvl)                                                



[facility]2018-05-06 07:07:00





             Test Item    Value        Reference Range Interpretation Comments

 

             Sodium Lvl (test code = Sodium Lvl) 147          135-145           

        



[place] 307108-05-06 07:07:00





             Test Item    Value        Reference Range Interpretation Comments

 

             CO2 (test code = CO2) 25           24-32                     



[place] 307108-05-06 07:07:00





             Test Item    Value        Reference Range Interpretation Comments

 

             Chloride Lvl (test code = Chloride Lvl) 114                  

            



[facility]2018-05-06 07:07:00





             Test Item    Value        Reference Range Interpretation Comments

 

             B/C Ratio (test code = B/C Ratio) 16 1         6-25                

      



[place] 307108-05-06 07:07:00





             Test Item    Value        Reference Range Interpretation Comments

 

             Calcium Lvl (test code = Calcium Lvl) 8.7          8.5-10.5        

          



[facility]2018-05-06 07:07:00





             Test Item    Value        Reference Range Interpretation Comments

 

             AGAP (test code = AGAP) 12.4         10.0-20.0                 



[facility]2018-05-06 07:07:00





             Test Item    Value        Reference Range Interpretation Comments

 

             Total Protein (test code = Total 7.3          6.4-8.4              

     



             Protein)                                            



[facility]rapevineVzlewabAOXIZZASMV8432-57-73 07:07:00





             Test Item    Value        Reference Range Interpretation Comments

 

             Platelet (test code = Platelet) 345          133-450               

    



[facility]rapevineCjppgzxRVBCOKFMGL4951-29-87 07:07:00





             Test Item    Value        Reference Range Interpretation Comments

 

             MPV (test code = MPV) 8.7          7.4-10.4                  



[facility] GrapevineNfbrzheBXUJXOGQYD3223-62-99 07:07:00





             Test Item    Value        Reference Range Interpretation Comments

 

             WBC (test code = WBC) 6.9          3.7-10.4                  



[facility] GrapevineTdagffmUJXDEUOKLC5007-91-74 07:07:00





             Test Item    Value        Reference Range Interpretation Comments

 

             RBC (test code = RBC) 5.30         4.70-6.10                 



[place] 048358-05-06 07:07:00





             Test Item    Value        Reference Range Interpretation Comments

 

             MCHC (test code = MCHC) 32.8         32.0-36.0                 



[facility] GrapevineYvssgyoELVLXCFCCA1361-79-37 07:07:00





             Test Item    Value        Reference Range Interpretation Comments

 

             MCH (test code = MCH) 27.9 pg      27.0-31.0                 



[facility] GrapevineWicbxaqZOSVTEZIEY8980-81-53 07:07:00





             Test Item    Value        Reference Range Interpretation Comments

 

             Hgb (test code = Hgb) 14.8         14.0-18.0                 



[facility] GrapevineDydedkaABBMFPTYDH6749-51-72 07:07:00





             Test Item    Value        Reference Range Interpretation Comments

 

             MCV (test code = MCV) 85.0         80.0-94.0                 



[facility]rapevineGesvnobHSQBYQSWSM8513-59-70 07:07:00





             Test Item    Value        Reference Range Interpretation Comments

 

             Hct (test code = Hct) 45.1         42.0-54.0                 



[facility] GrapevineTbedemqPIAWUSKORJ3939-05-79 07:07:00





             Test Item    Value        Reference Range Interpretation Comments

 

             RDW (test code = RDW) 17.5         11.5-14.5                 



[facility]rapevineMvrwiwdBEUAVRNZSE3863-62-19 07:07:00





             Test Item    Value        Reference Range Interpretation Comments

 

             Eosinophils # (test code 0.2          See_Comment                [A

utomated message] The



             = Eosinophils #)                                        system whic

h generated



                                                                 this result tra

nsmitted



                                                                 reference range

: <=0.5.



                                                                 The reference r

zhen was



                                                                 not used to int

erpret



                                                                 this result as



                                                                 normal/abnormal

.



[facility] GrapevineAxflxtbFJDJIPKQYK6675-98-92 07:07:00





             Test Item    Value        Reference Range Interpretation Comments

 

             Lymphocytes # (test code = Lymphocytes 2.0          1.0-5.5        

           



             #)                                                  



[facility] GrapevineQomfhzlCOKJMCVYDC2885-06-51 07:07:00





             Test Item    Value        Reference Range Interpretation Comments

 

             Monocytes # (test code 0.7          See_Comment                [Aut

omated message] The



             = Monocytes #)                                        system which 

generated



                                                                 this result tra

nsmitted



                                                                 reference range

: <=0.8.



                                                                 The reference r

zhen was



                                                                 not used to int

erpret



                                                                 this result as



                                                                 normal/abnormal

.



[facility] GrapevineDqpfiemLCMUFBZMFK9412-10-68 07:07:00





             Test Item    Value        Reference Range Interpretation Comments

 

             Eosinophils (test code = 2.4          See_Comment                [A

utomated message] The



             Eosinophils)                                        system which ge

nerated



                                                                 this result tra

nsmitted



                                                                 reference range

: <=4.0.



                                                                 The reference r

zhen was



                                                                 not used to int

erpret



                                                                 this result as



                                                                 normal/abnormal

.



[facility] GrapevinePnljrlmWYOMCSOHXS4307-73-55 07:07:00





             Test Item    Value        Reference Range Interpretation Comments

 

             Basophils (test code = 0.6          See_Comment                [Aut

omated message] The



             Basophils)                                          system which ge

nerated



                                                                 this result tra

nsmitted



                                                                 reference range

: <=1.0.



                                                                 The reference r

zhen was



                                                                 not used to int

erpret



                                                                 this result as



                                                                 normal/abnormal

.



[facility] GrapevineLcyfdhfEQCZKKKNCX9641-93-99 07:07:00





             Test Item    Value        Reference Range Interpretation Comments

 

             Segs-Bands # (test code = Segs-Bands #) 4.0          1.5-8.1       

            



[facility] GrapevinePwaxetsLPYUOJHWXY6334-80-13 07:07:00





             Test Item    Value        Reference Range Interpretation Comments

 

             Monocytes (test code = Monocytes) 10.4         2.0-12.0            

      



[facility] GrapevineVhvuocsPZUJHTPLSB7700-19-84 07:07:00





             Test Item    Value        Reference Range Interpretation Comments

 

             RBC Morph (test code = Normal (18 2:07 AM)                     

      



             RBC Morph)                                          



[facility] GrapevineDvtrameTBEOSISTHW1550-78-89 07:07:00





             Test Item    Value        Reference Range Interpretation Comments

 

             Segs (test code = Segs) 58.1         45.0-75.0                 



[facility]rapevineIgmuqukPKZBESSSNX1980-23-81 07:07:00





             Test Item    Value        Reference Range Interpretation Comments

 

             Plt Morph (test code = Normal (18 2:07 AM)                     

      



             Plt Morph)                                          



[facility] GrapevineGmltcwbXEIYCMEGVL6206-35-03 07:07:00





             Test Item    Value        Reference Range Interpretation Comments

 

             Lymphocytes (test code = Lymphocytes) 28.5         20.0-40.0       

          



[facility] GrapevinePdpfcppNPUKWHHFVR5018-44-01 16:07:00





             Test Item    Value        Reference Range Interpretation Comments

 

             Basophils # (test code 0.1          See_Comment                [Aut

omated message] The



             = Basophils #)                                        system which 

generated



                                                                 this result tra

nsmitted



                                                                 reference range

: <=0.2.



                                                                 The reference r

zhen was



                                                                 not used to int

erpret



                                                                 this result as



                                                                 normal/abnormal

.



[facility] GrapevineIptpdsgCAXAHKMERC4742-78-45 16:07:00





             Test Item    Value        Reference Range Interpretation Comments

 

             Polychrom (test code = Moderate *ABN*(18                       

    



             Polychrom)   11:07 AM)                              



[facility]tgvkljAKVROSWUSZ8518[facility] 06:43:00





             Test Item    Value        Reference Range Interpretation Comments

 

             Vanco Tr TND (test code = Vanco Tr TND) *                          

            



[facility] CenterDjnwsgeGMRKPGXEJK8070-73-65 06:43:00





             Test Item    Value        Reference Range Interpretation Comments

 

             Vanco Tr (test code = Vanco Tr) 22.3                               

    



[facility]2018-05-04 11:45:00





             Test Item    Value        Reference Range Interpretation Comments

 

             Magnesium Lvl (test code = Magnesium 2.3          1.8-2.4          

         



             Lvl)                                                



[facility]2018-05-04 11:45:00





             Test Item    Value        Reference Range Interpretation Comments

 

             Phosphorus (test code = Phosphorus) 3.5          2.5-4.5           

        



[facility]rapevineEvgtzzbDDELOQMNRC4267-57-48 11:45:00





             Test Item    Value        Reference Range Interpretation Comments

 

             PT (test code = PT) 14.0 s       12.0-14.7                 



[facility] GrapevineGgueddqSBVYGDFGUD9015-85-99 11:45:00





             Test Item    Value        Reference Range Interpretation Comments

 

             PTT (test code = PTT) 37.6 s       22.9-35.8                 



[facility] GrapevineLjphoswHQHBHREPQT1509-52-68 11:45:00





             Test Item    Value        Reference Range Interpretation Comments

 

             INR (test code = INR) 1.08 1       0.85-1.17                 



[facility]EawwiafRJVQPIVUQU8045-52-57 11:45:00





             Test Item    Value        Reference Range Interpretation Comments

 

             C-REACTIVE PROTEIN (test code = 13.1                               

    



             C-REACTIVE PROTEIN)                                        



[facility]iscoTcdjvvhFNABXPJFMS4690-65-88 11:45:00





             Test Item    Value        Reference Range Interpretation Comments

 

             Prealbumin (test code = Prealbumin) 25.2         18.0-45.0         

        



[facility]2018-05-04 03:06:00





             Test Item    Value        Reference Range Interpretation Comments

 

             Lactic Acid Lvl (test code = Lactic 0.9          0.5-2.2           

        



             Acid Lvl)                                           



[facility]rapevineGkurlkfBOSGROUELJ0997-62-81 03:06:00





             Test Item    Value        Reference Range Interpretation Comments

 

             Sed Rate (test code = 5            See_Comment                [Auto

mated message] The



             Sed Rate)                                           system which ge

nerated this



                                                                 result transmit

huma



                                                                 reference range

: <=15. The



                                                                 reference range

 was not



                                                                 used to interpr

et this



                                                                 result as zayda

l/abnormal.



[facility]iscoYvqdyfsXRLJNPNDFX6447-06-19 03:06:00





             Test Item    Value        Reference Range Interpretation Comments

 

             C-REACTIVE PROTEIN (test code = 15.8                               

    



             C-REACTIVE PROTEIN)                                        



[facility]rapevineCzceuilIZJDXSSFLO0005-99-20 00:44:00





             Test Item    Value        Reference Range Interpretation Comments

 

             PT (test code = PT) 13.0 s       12.0-14.7                 



[facility]rapevineGwohnbxBZZEFXEPPT3324-80-00 00:44:00





             Test Item    Value        Reference Range Interpretation Comments

 

             INR (test code = INR) 0.98 1       0.85-1.17                 



[facility] GrapevineHeiwppcQNRYGZJBOX1339-61-16 00:44:00





             Test Item    Value        Reference Range Interpretation Comments

 

             PTT (test code = PTT) 33.2 s       22.9-35.8                 



[facility] LTSIRTZ2612-27-34 23:51:00





             Test Item    Value        Reference Range Interpretation Comments

 

             Antibody Scrn (test Negative (5/3/18 6:51                          

 



             code = Antibody Scrn) PM)                                    



[facility] IAEVHGO7122-43-22 23:51:00





             Test Item    Value        Reference Range Interpretation Comments

 

             ABO/Rh (test code = ABO/Rh) AB POS                                 



[facility] AND [id number] 23:35:00





             Test Item    Value        Reference Range Interpretation Comments

 

             UA Urobilinogen (test code = UA 0.2          0.1-1.0               

    



             Urobilinogen)                                        



[facility] AND [id number] 23:35:00





             Test Item    Value        Reference Range Interpretation Comments

 

             UA Nitrite (test code Negative (5/3/18 6:35                        

   



             = UA Nitrite) PM)                                    



[facility] AND [id number] 23:35:00





             Test Item    Value        Reference Range Interpretation Comments

 

             UA Glucose (test code Negative (5/3/18 6:35                        

   



             = UA Glucose) PM)                                    



[facility] AND [id number] 23:35:00





             Test Item    Value        Reference Range Interpretation Comments

 

             UA Ketones (test code Negative *NA*(5/3/18                         

  



             = UA Ketones) 6:35 PM)                               



[facility] AND [id number] 23:35:00





             Test Item    Value        Reference Range Interpretation Comments

 

             UA Bili (test code = Negative *NA*(5/3/18                          

 



             UA Bili)     6:35 PM)                               



[facility] AND [id number] 23:35:00





             Test Item    Value        Reference Range Interpretation Comments

 

             UA Blood (test code = Trace *ABN*(5/3/18                           



             UA Blood)    6:35 PM)                               



[facility] AND [id number] 23:35:00





             Test Item    Value        Reference Range Interpretation Comments

 

             UA Leuk Est (test code Small *ABN*(5/3/18 6:35                     

      



             = UA Leuk Est) PM)                                    



[facility] [id number] 23:35:00





             Test Item    Value        Reference Range Interpretation Comments

 

             UA Spec Grav (test code = UA Spec 1.020 1                          

      



             Grav)                                               



[facility] AND [id number] 23:35:00





             Test Item    Value        Reference Range Interpretation Comments

 

             UA pH (test code = UA pH) 6.0 1        5.0-8.0                   



[facility] AND [id number] 23:35:00





             Test Item    Value        Reference Range Interpretation Comments

 

             UA Color (test code = Yellow *NA*(5/3/18 6:35                      

     



             UA Color)    PM)                                    



[facility] [id number] 23:35:00





             Test Item    Value        Reference Range Interpretation Comments

 

             UA Protein (test code = Trace *ABN*(5/3/18                         

  



             UA Protein)  6:35 PM)                               



[facility] AND [id number] 23:35:00





             Test Item    Value        Reference Range Interpretation Comments

 

             UA Turbidity (test code Slight Cloudy (5/3/18                      

     



             = UA Turbidity) 6:35 PM)                               



[facility] AND [id number] 23:35:00





             Test Item    Value        Reference Range Interpretation Comments

 

             UA Hyal Cast 0-2 (5/3/18 6:35 See_Comment                [Automated

 message]



             (test code = UA PM)                                    The system w

[facility]



             Hyal Cast)                                          generated this 

result



                                                                 transmitted ref

erence



                                                                 range: <=2. The



                                                                 reference range

 was



                                                                 not used to int

erpret



                                                                 this result as



                                                                 normal/abnormal

.



[facility] AND [id number] 23:35:00





             Test Item    Value        Reference Range Interpretation Comments

 

             UA Bacteria (test code = UA Occasional /HPF                        

   



             Bacteria)                                           



[facility] AND [id number] 23:35:00





             Test Item    Value        Reference Range Interpretation Comments

 

             UA RBC (test code 11-20 /HPF   See_Comment                [Automate

d message] The



             = UA RBC)                                           system which ge

nerated



                                                                 this result tra

nsmitted



                                                                 reference range

: <=2. The



                                                                 reference range

 was not



                                                                 used to interpr

et this



                                                                 result as



                                                                 normal/abnormal

.



[facility] AND [id number] 23:35:00





             Test Item    Value        Reference Range Interpretation Comments

 

             UA Sq Epi (test code = UA Sq Occasional /LPF                       

    



             Epi)                                                



[facility] AND [id number] 23:35:00





             Test Item    Value        Reference Range Interpretation Comments

 

             UA WBC (test code = UA WBC)  /HPF                            



[id number]





             Test Item    Value        Reference Range Interpretation Comments

 

             Basophils # (test code 0.1          See_Comment                [Aut

omated message] The



             = Basophils #)                                        system which 

generated



                                                                 this result tra

nsmitted



                                                                 reference range

: <=0.2.



                                                                 The reference r

zhen was



                                                                 not used to int

erpret



                                                                 this result as



                                                                 normal/abnormal

.



[id number]





             Test Item    Value        Reference Range Interpretation Comments

 

             Polychrom (test code = Polychrom) Slight                           

      



[id number] 23:20:00





             Test Item    Value        Reference Range Interpretation Comments

 

             Plt Morph (test code = Normal (5/3/18 6:20 PM)                     

      



             Plt Morph)                                          



[facility] [id number] 16:22:00





             Test Item    Value        Reference Range Interpretation Comments

 

             CULTURE (BEAKER) (test No growth in 5 days                         

  



             code = 1095)                                        



BLOOD [id number] 16:22:00





             Test Item    Value        Reference Range Interpretation Comments

 

             CULTURE (BEAKER) (test No growth in 5 days                         

  



             code = 1095)                                        



(MANUAL DIFFERENTIAL)2017 22:29:00





             Test Item    Value        Reference Range Interpretation Comments

 

             TOTAL COUNTED (BEAKER) (test code =                                

        



             1351)                                               

 

             WBC MORPHOLOGY (BEAKER) (test code = Normal                        

         



             487)                                                

 

             PLT MORPHOLOGY (BEAKER) (test code = Normal                        

         



             486)                                                

 

             RBC MORPHOLOGY (BEAKER) (test code = Normal                        

         



             762)                                                



CBC W/PLT COUNT &amp; AUTO [id number] 22:28:00





             Test Item    Value        Reference Range Interpretation Comments

 

             WHITE BLOOD CELL COUNT (BEAKER) 7.3 K/ L     4.0-10.0              

    



             (test code = 775)                                        

 

             RED BLOOD CELL COUNT (BEAKER) 5.09 M/ L    4.20-5.80               

  



             (test code = 761)                                        

 

             HEMOGLOBIN (BEAKER) (test code = 13.0 GM/DL   13.0-16.8            

     



             410)                                                

 

             HEMATOCRIT (BEAKER) (test code = 42.3 %       40.0-50.0            

     



             411)                                                

 

             MEAN CORPUSCULAR VOLUME (BEAKER) 83.0 fL      82.0-98.0            

     



             (test code = 753)                                        

 

             MEAN CORPUSCULAR HEMOGLOBIN 25.6 pg      27.0-33.0    L            



             (BEAKER) (test code = 751)                                        

 

             MEAN CORPUSCULAR HEMOGLOBIN CONC 30.8 GM/DL   32.0-36.0    L       

     



             (BEAKER) (test code = 752)                                        

 

             RED CELL DISTRIBUTION WIDTH 18.0 %       10.3-14.2    H            



             (BEAKER) (test code = 412)                                        

 

             PLATELET COUNT (BEAKER) (test 331 K/CU MM  150-430                 

  



             code = 756)                                         

 

             MEAN PLATELET VOLUME (BEAKER) 8.5 fL       6.5-10.5                

  



             (test code = 754)                                        

 

             NUCLEATED RED BLOOD CELLS 0 /100 WBC   0-0                       



             (BEAKER) (test code = 413)                                        

 

             NEUTROPHILS RELATIVE PERCENT 65 %                                  

 



             (BEAKER) (test code = 429)                                        

 

             LYMPHOCYTES RELATIVE PERCENT 23 %                                  

 



             (BEAKER) (test code = 430)                                        

 

             MONOCYTES RELATIVE PERCENT 8 %                                    



             (BEAKER) (test code = 431)                                        

 

             EOSINOPHILS RELATIVE PERCENT 3 %                                   

 



             (BEAKER) (test code = 432)                                        

 

             BASOPHILS RELATIVE PERCENT 1 %                                    



             (BEAKER) (test code = 437)                                        

 

             NEUTROPHILS ABSOLUTE COUNT 4.75 K/ L    1.80-8.00                 



             (BEAKER) (test code = 670)                                        

 

             LYMPHOCYTES ABSOLUTE COUNT 1.68 K/ L    1.48-4.50                 



             (BEAKER) (test code = 414)                                        

 

             MONOCYTES ABSOLUTE COUNT (BEAKER) 0.55 K/ L    0.00-1.30           

      



             (test code = 415)                                        

 

             EOSINOPHILS ABSOLUTE COUNT 0.24 K/ L    0.00-0.50                 



             (BEAKER) (test code = 416)                                        

 

             BASOPHILS ABSOLUTE COUNT (BEAKER) 0.05 K/ L    0.00-0.20           

      



             (test code = 417)                                        



[id number] METABOLIC [id number] 11:11:00





             Test Item    Value        Reference Range Interpretation Comments

 

             SODIUM (BEAKER) 138 meq/L    136-145                   



             (test code = 381)                                        

 

             POTASSIUM (BEAKER) 4.0 meq/L    3.5-5.1                   



             (test code = 379)                                        

 

             CHLORIDE (BEAKER) 108 meq/L           H            



             (test code = 382)                                        

 

             CO2 (BEAKER) (test 23 meq/L     22-29                     



             code = 355)                                         

 

             BLOOD UREA NITROGEN 11 mg/dL     7-21                      



             (BEAKER) (test code                                        



             = 354)                                              

 

             CREATININE (BEAKER) 0.74 mg/dL   0.57-1.25                 



             (test code = 358)                                        

 

             GLUCOSE RANDOM 124 mg/dL           H            



             (BEAKER) (test code                                        



             = 652)                                              

 

             CALCIUM (BEAKER) 8.9 mg/dL    8.4-10.2                  



             (test code = 697)                                        

 

             EGFR (BEAKER) (test 150 mL/min/1.73                           ESTIM

ATED GFR IS



             code = 1092) sq m                                   NOT AS ACCURATE

 AS



                                                                 CREATININE



                                                                 CLEARANCE IN



                                                                 PREDICTING



                                                                 GLOMERULAR



                                                                 FILTRATION RATE

.



                                                                 ESTIMATED GFR I

S



                                                                 NOT APPLICABLE 

FOR



                                                                 DIALYSIS PATIEN

TS.



URINE [id number] 09:56:00





             Test Item    Value        Reference Range Interpretation Comments

 

             CULTURE (BEAKER) (test <10,000 col/mL skin                         

  



             code = 1095) jaime                                  



COMPREHENSIVE METABOLIC [id number] 08:49:00





             Test Item    Value        Reference Range Interpretation Comments

 

             TOTAL PROTEIN 7.3 gm/dL    6.0-8.3                   



             (BEAKER) (test code =                                        



             770)                                                

 

             ALBUMIN (BEAKER) 3.3 g/dL     3.5-5.0      L            



             (test code = 1145)                                        

 

             ALKALINE PHOSPHATASE 89 U/L                           



             (BEAKER) (test code =                                        



             346)                                                

 

             BILIRUBIN TOTAL 1.4 mg/dL    0.2-1.2      H            



             (BEAKER) (test code =                                        



             377)                                                

 

             SODIUM (BEAKER) (test 137 meq/L    136-145                   



             code = 381)                                         

 

             POTASSIUM (BEAKER) 3.7 meq/L    3.5-5.1                   



             (test code = 379)                                        

 

             CHLORIDE (BEAKER) 108 meq/L           H            



             (test code = 382)                                        

 

             CO2 (BEAKER) (test 17 meq/L     22-29        L            



             code = 355)                                         

 

             BLOOD UREA NITROGEN 12 mg/dL     7-21                      



             (BEAKER) (test code =                                        



             354)                                                

 

             CREATININE (BEAKER) 0.78 mg/dL   0.57-1.25                 



             (test code = 358)                                        

 

             GLUCOSE RANDOM 119 mg/dL           H            



             (BEAKER) (test code =                                        



             652)                                                

 

             CALCIUM (BEAKER) 8.6 mg/dL    8.4-10.2                  



             (test code = 697)                                        

 

             AST (SGOT) (BEAKER) 13 U/L       5-34                      



             (test code = 353)                                        

 

             ALT (SGPT) (BEAKER) 28 U/L       6-55                      



             (test code = 347)                                        

 

             EGFR (BEAKER) (test 141                                    ESTIMATE

D GFR IS



             code = 1092) mL/min/1.73 sq                           NOT AS ACCURA

TE AS



                          m                                      CREATININE



                                                                 CLEARANCE IN



                                                                 PREDICTING



                                                                 GLOMERULAR



                                                                 FILTRATION RATE

.



                                                                 ESTIMATED GFR I

S



                                                                 NOT APPLICABLE 

FOR



                                                                 DIALYSIS PATIEN

TS.



CBC W/PLT COUNT &amp; AUTO [id number] 08:46:00





             Test Item    Value        Reference Range Interpretation Comments

 

             WHITE BLOOD CELL COUNT (BEAKER) 9.4 K/ L     4.0-10.0              

    



             (test code = 775)                                        

 

             RED BLOOD CELL COUNT (BEAKER) 4.79 M/ L    4.20-5.80               

  



             (test code = 761)                                        

 

             HEMOGLOBIN (BEAKER) (test code = 12.8 GM/DL   13.0-16.8    L       

     



             410)                                                

 

             HEMATOCRIT (BEAKER) (test code = 40.0 %       40.0-50.0            

     



             411)                                                

 

             MEAN CORPUSCULAR VOLUME (BEAKER) 83.4 fL      82.0-98.0            

     



             (test code = 753)                                        

 

             MEAN CORPUSCULAR HEMOGLOBIN 26.7 pg      27.0-33.0    L            



             (BEAKER) (test code = 751)                                        

 

             MEAN CORPUSCULAR HEMOGLOBIN CONC 32.0 GM/DL   32.0-36.0            

     



             (BEAKER) (test code = 752)                                        

 

             RED CELL DISTRIBUTION WIDTH 18.2 %       10.3-14.2    H            



             (BEAKER) (test code = 412)                                        

 

             PLATELET COUNT (BEAKER) (test 313 K/CU MM  150-430                 

  



             code = 756)                                         

 

             MEAN PLATELET VOLUME (BEAKER) 8.6 fL       6.5-10.5                

  



             (test code = 754)                                        

 

             NUCLEATED RED BLOOD CELLS 0 /100 WBC   0-0                       



             (BEAKER) (test code = 413)                                        

 

             NEUTROPHILS RELATIVE PERCENT 70 %                                  

 



             (BEAKER) (test code = 429)                                        

 

             LYMPHOCYTES RELATIVE PERCENT 16 %                                  

 



             (BEAKER) (test code = 430)                                        

 

             MONOCYTES RELATIVE PERCENT 12 %                                   



             (BEAKER) (test code = 431)                                        

 

             EOSINOPHILS RELATIVE PERCENT 1 %                                   

 



             (BEAKER) (test code = 432)                                        

 

             BASOPHILS RELATIVE PERCENT 1 %                                    



             (BEAKER) (test code = 437)                                        

 

             NEUTROPHILS ABSOLUTE COUNT 6.54 K/ L    1.80-8.00                 



             (BEAKER) (test code = 670)                                        

 

             LYMPHOCYTES ABSOLUTE COUNT 1.50 K/ L    1.48-4.50                 



             (BEAKER) (test code = 414)                                        

 

             MONOCYTES ABSOLUTE COUNT (BEAKER) 1.13 K/ L    0.00-1.30           

      



             (test code = 415)                                        

 

             EOSINOPHILS ABSOLUTE COUNT 0.13 K/ L    0.00-0.50                 



             (BEAKER) (test code = 416)                                        

 

             BASOPHILS ABSOLUTE COUNT (BEAKER) 0.06 K/ L    0.00-0.20           

      



             (test code = 417)                                        



0.00URINALYSIS W/ [id number] 20:36:00





             Test Item    Value        Reference Range Interpretation Comments

 

             COLOR (BEAKER) (test code = 470) Yellow                            

     

 

             CLARITY (BEAKER) (test code = 469) Hazy                            

       

 

             SPECIFIC GRAVITY UA (BEAKER) (test 1.012        1.001-1.035        

       



             code = 468)                                         

 

             PH UA (BEAKER) (test code = 467) 5.5          5.0-8.0              

     

 

             PROTEIN UA (BEAKER) (test code = 100 mg/dL    Negative     A       

     



             464)                                                

 

             GLUCOSE UA (BEAKER) (test code = Negative     Negative             

     



             365)                                                

 

             KETONES UA (BEAKER) (test code = Negative     Negative             

     



             371)                                                

 

             BILIRUBIN UA (BEAKER) (test code = Negative     Negative           

       



             462)                                                

 

             BLOOD UA (BEAKER) (test code = 461) Moderate     Negative     A    

        

 

             NITRITE UA (BEAKER) (test code = Negative     Negative             

     



             465)                                                

 

             LEUKOCYTE ESTERASE UA (BEAKER) Large        Negative     A         

   



             (test code = 466)                                        

 

             UROBILINOGEN UA (BEAKER) (test code 3.0 mg/dL    0.2-1.0      H    

        



             = 463)                                              

 

             RBC UA (BEAKER) (test code = 519) 19 /HPF                          

      

 

             WBC UA (BEAKER) (test code = 520) 182 /HPF                         

      

 

             SOURCE(BEAKER) (test code = 5679)                                  

      



BASIC METABOLIC [id number] 17:00:00





             Test Item    Value        Reference Range Interpretation Comments

 

             SODIUM (BEAKER) 140 meq/L    136-145                   



             (test code = 381)                                        

 

             POTASSIUM (BEAKER) 3.7 meq/L    3.5-5.1                   



             (test code = 379)                                        

 

             CHLORIDE (BEAKER) 112 meq/L           H            



             (test code = 382)                                        

 

             CO2 (BEAKER) (test 17 meq/L     22-29        L            



             code = 355)                                         

 

             BLOOD UREA NITROGEN 23 mg/dL     7-21         H            



             (BEAKER) (test code                                        



             = 354)                                              

 

             CREATININE (BEAKER) 1.00 mg/dL   0.57-1.25                 



             (test code = 358)                                        

 

             GLUCOSE RANDOM 102 mg/dL                        



             (BEAKER) (test code                                        



             = 652)                                              

 

             CALCIUM (BEAKER) 8.7 mg/dL    8.4-10.2                  



             (test code = 697)                                        

 

             EGFR (BEAKER) (test 106 mL/min/1.73                           ESTIM

ATED GFR IS



             code = 1092) sq m                                   NOT AS ACCURATE

 AS



                                                                 CREATININE



                                                                 CLEARANCE IN



                                                                 PREDICTING



                                                                 GLOMERULAR



                                                                 FILTRATION RATE

.



                                                                 ESTIMATED GFR I

S



                                                                 NOT APPLICABLE 

FOR



                                                                 DIALYSIS PATIEN

TS.



Specimen slightly ictericCBC W/PLT COUNT &amp; AUTO [id number] 
12:18:00





             Test Item    Value        Reference Range Interpretation Comments

 

             WHITE BLOOD CELL COUNT (BEAKER) 16.4 K/ L    4.0-10.0     H        

    



             (test code = 775)                                        

 

             RED BLOOD CELL COUNT (BEAKER) 5.22 M/ L    4.20-5.80               

  



             (test code = 761)                                        

 

             HEMOGLOBIN (BEAKER) (test code = 14.4 GM/DL   13.0-16.8            

     



             410)                                                

 

             HEMATOCRIT (BEAKER) (test code = 42.9 %       40.0-50.0            

     



             411)                                                

 

             MEAN CORPUSCULAR VOLUME (BEAKER) 82.2 fL      82.0-98.0            

     



             (test code = 753)                                        

 

             MEAN CORPUSCULAR HEMOGLOBIN 27.5 pg      27.0-33.0                 



             (BEAKER) (test code = 751)                                        

 

             MEAN CORPUSCULAR HEMOGLOBIN CONC 33.5 GM/DL   32.0-36.0            

     



             (BEAKER) (test code = 752)                                        

 

             RED CELL DISTRIBUTION WIDTH 16.2 %       10.3-14.2    H            



             (BEAKER) (test code = 412)                                        

 

             PLATELET COUNT (BEAKER) (test 329 K/CU MM  150-430                 

  



             code = 756)                                         

 

             MEAN PLATELET VOLUME (BEAKER) 8.2 fL       6.5-10.5                

  



             (test code = 754)                                        

 

             NUCLEATED RED BLOOD CELLS 0 /100 WBC   0-0                       



             (BEAKER) (test code = 413)                                        

 

             NEUTROPHILS RELATIVE PERCENT 83 %                                  

 



             (BEAKER) (test code = 429)                                        

 

             LYMPHOCYTES RELATIVE PERCENT 7 %                                   

 



             (BEAKER) (test code = 430)                                        

 

             MONOCYTES RELATIVE PERCENT 9 %                                    



             (BEAKER) (test code = 431)                                        

 

             EOSINOPHILS RELATIVE PERCENT 0 %                                   

 



             (BEAKER) (test code = 432)                                        

 

             BASOPHILS RELATIVE PERCENT 0 %                                    



             (BEAKER) (test code = 437)                                        

 

             NEUTROPHILS ABSOLUTE COUNT 1.62 K/ L    1.80-8.00    L            



             (BEAKER) (test code = 670)                                        

 

             LYMPHOCYTES ABSOLUTE COUNT 1.15 K/ L    1.48-4.50    L            



             (BEAKER) (test code = 414)                                        

 

             MONOCYTES ABSOLUTE COUNT (BEAKER) 1.56 K/ L    0.00-1.30    H      

      



             (test code = 415)                                        

 

             EOSINOPHILS ABSOLUTE COUNT 0.01 K/ L    0.00-0.50                 



             (BEAKER) (test code = 416)                                        

 

             BASOPHILS ABSOLUTE COUNT (BEAKER) 0.02 K/ L    0.00-0.20           

      



             (test code = 417)                                        



(MANUAL DIFFERENTIAL)2017 12:18:00





             Test Item    Value        Reference Range Interpretation Comments

 

             TOTAL COUNTED (BEAKER) (test code =                                

        



             1351)                                               

 

             WBC MORPHOLOGY (BEAKER) (test code = Normal                        

         



             487)                                                

 

             PLT MORPHOLOGY (BEAKER) (test code = Normal                        

         



             486)                                                

 

             RBC MORPHOLOGY (BEAKER) (test code = Normal                        

         



             762)

## 2022-03-18 NOTE — RAD REPORT
EXAM DESCRIPTION:  CT - Head Brain Wo Cont - 3/18/2022 9:14 pm

 

CLINICAL HISTORY:  HEADACHE

Headache, drowsiness

 

COMPARISON:  Head Brain Wo Cont dated 1/7/2020; Head Brain Wo Cont dated 1/18/2019

 

TECHNIQUE:  All CT scans are performed using dose optimization technique as appropriate and may inclu
de automated exposure control or mA/KV adjustment according to patient size.

 

FINDINGS:  Two right-sided ventriculostomy tubes are noted, unchanged.No hydrocephalus is seen.No acu
te hemorrhage or midline shift.

 

The paranasal sinuses and mastoids are clear. The calvarium is intact.

 

IMPRESSION:  No acute intracranial abnormality.

## 2022-03-18 NOTE — ER
Nurse's Notes                                                                                     

 [facility]                                                                 

Name: Redd Dale Jr                                                                            

Age: 36 yrs                                                                                       

Sex: Male                                                                                         

: 1985                                                                                   

MRN: [id number]                                                                                   

Arrival Date: 2022                                                                          

Time: 18:33                                                                                       

Account#: [id number]                                                                            

Bed 13                                                                                            

Private MD:                                                                                       

Diagnosis: Headache; Shunt, cerebral palsy, recurrent headaches                                 

                                                                                                  

Presentation:                                                                                     

                                                                                             

18:40 Chief complaint: Patient states: Pt c/o HA with hx of  shunt, CP, and hydrocephalus.  ss7 

      Seen in [place] on yesterday and here on Monday. Coronavirus screen: Vaccine status:       

      Patient reports receiving the 2nd dose of the covid vaccine. Ebola Screen: No symptoms      

      or risks identified at this time. Initial Sepsis Screen: Does the patient meet any 2        

      criteria? No. Patient's initial sepsis screen is negative. Does the patient have a          

      suspected source of infection? No. Patient's initial sepsis screen is negative. Risk        

      Assessment: Do you want to hurt yourself or someone else? Patient reports no desire to      

      harm self or others. Onset of symptoms was 2022.                                  

18:40 Method Of Arrival: EMS                                                                  [facility] 

18:40 Acuity: AYESHA 3                                                                           ss7 

                                                                                                  

Triage Assessment:                                                                                

18:44 General: Appears in no apparent distress. comfortable, Behavior is calm, cooperative,   ss7 

      appropriate for age. Pain: Complains of pain in head. EENT: No deficits noted. Neuro:       

      Level of Consciousness is awake, alert, obeys commands, Oriented to person, place,          

      time, situation, pmh of cerebral palsy. Cardiovascular: Heart tones S1 S2. Respiratory:     

      Breath sounds are clear bilaterally. GI: No deficits noted. : No deficits noted.          

      Derm: No deficits noted. Musculoskeletal: No deficits noted.                                

                                                                                                  

Historical:                                                                                       

- Allergies:                                                                                      

18:44 Amoxicillin;                                                                            7 

18:44 Bactrim;                                                                                7 

18:44 Ciprofloxacin;                                                                          7 

18:44 CLAVULANIC ACID;                                                                        7 

18:44 Demerol;                                                                                7 

18:44 Doxycycline;                                                                            7 

18:44 Levofloxacin;                                                                           7 

18:44 Morphine;                                                                               7 

18:44 PENICILLINS;                                                                            ss7 

18:44 Toradol;                                                                                ss7 

18:44 TRIMETHOPRIM;                                                                           ss7 

18:44 Vancomycin;                                                                             ss7 

18:44 Zofran;                                                                                 ss7 

- Home Meds:                                                                                      

18:44 Celexa 20 mg Oral tab 1 tab once daily [Active]; fentanyl 25 mcg/hr Topical pt72 1      ss7 

      patch every 72 hours [Active]; Pepcid 20 mg Oral tab 1 tab once daily [Active];             

      Percocet  mg Oral tab 1 tab every 6 hours [Active]; Reglan 10 mg Oral tab 1 tab       

      once daily [Active]; Wellbutrin  mg Oral TbER 1 tab 2 times per day [Active];         

- PMHx:                                                                                           

18:44 Asthma; Cerebral Palsy; cluster headaches; decubitus ulcers on feet; GERD;              ss7 

      Hydrocephalus; Hypertension; spina bifida;                                                  

                                                                                                  

- Immunization history:: Adult Immunizations up to date.                                          

- Social history:: Smoking status: Patient denies any tobacco usage or history of.                

                                                                                                  

                                                                                                  

Screenin:47 Abuse screen: Denies threats or abuse. Nutritional screening: No deficits noted.        ss7 

      Tuberculosis screening: No symptoms or risk factors identified. Fall Risk None              

      identified.                                                                                 

                                                                                                  

Assessment:                                                                                       

18:47 General: see triage.                                                                    ss7 

19:15 General: Appears comfortable, obese, well groomed, well developed, well nourished,      tk1 

      Behavior is calm, cooperative, appropriate for age, Smells of urine. Pain: Complains of     

      pain in head Pain does not radiate. Pain currently is 10 out of 10 on a pain scale.         

      Quality of pain is described as aching, Pain began gradually, 2-3 days ago. Is              

      continuous. Neuro: Level of Consciousness is awake, alert, obeys commands, Oriented to      

      person, place, time, situation, Appropriate for age  are equal bilaterally.            

      Cardiovascular: Capillary refill < 3 seconds is brisk in bilateral fingers.                 

      Respiratory: No deficits noted. Airway is patent Respiratory effort is even, unlabored,     

      Respiratory pattern is regular, symmetrical. GI: No deficits noted. No signs and/or         

      symptoms were reported involving the gastrointestinal system. : No deficits noted. No     

      signs and/or symptoms were reported regarding the genitourinary system. EENT: No            

      deficits noted. No signs and/or symptoms were reported regarding the EENT system. Derm:     

      No deficits noted. No signs and/or symptoms reported regarding the dermatologic system.     

      Musculoskeletal: to bilateral lower extremities.                                            

20:30 Reassessment: No changes from previously documented assessment. Patient and/or family   tk1 

      updated on plan of care and expected duration. Pain level reassessed. Patient is alert,     

      oriented x 3, equal unlabored respirations, skin warm/dry/pink. Pain: Pain currently is     

      7 out of 10 on a pain scale.                                                                

22:44 Reassessment: D/C per MD order. Discharge instructions given to patient. Verbalized     tk1 

      understanding.                                                                              

23:18 Reassessment: EMS arrived for transport home. Patient request said nurse to clean his   tk1 

      darshan area before he goes home. EMS awaiting cleansing in hallway. Patient with large        

      formed, brown BM. Cleansed and redressed patient for transport home.                        

                                                                                                  

Vital Signs:                                                                                      

18:40  / 91; Pulse 107; Resp 20; Temp 98.3; Pulse Ox 95% ; Weight 127.46 kg; Height 4   ss7 

      ft. 11 in. (149.86 cm);                                                                     

19:00  / 81 LA Supine (auto/reg); Pulse 97 MON; Resp 18 S; Temp 98.1(O); Pulse Ox 92%   tk1 

      on R/A; Pain 7/10;                                                                          

20:00  / 74 LA Supine (auto/reg); Pulse 96 MON; Resp 16 S; Pulse Ox 95% on R/A; Pain    tk1 

      10/10;                                                                                      

21:00  / 60 LA Supine (auto/reg); Pulse 91 MON; Resp 20; Pulse Ox 95% on R/A; Pain 4/10;tk1 

22:00  / 61 LA Supine (auto/reg); Pulse 92 MON; Resp 18 S; Pulse Ox 92% on R/A; Pain    tk1 

      3/10;                                                                                       

22:20 Pain 2/10;                                                                              tk1 

18:40 Body Mass Index 56.75 (127.46 kg, 149.86 cm)                                            7 

                                                                                                  

ED Course:                                                                                        

18:33 Patient arrived in ED.                                                                  eb  

18:40 Ella Diaz, RN is Primary Nurse.                                                      ss7 

18:44 Triage completed.                                                                       ss7 

18:44 Arm band placed on.                                                                     ss7 

18:47 Patient has correct armband on for positive identification. Bed in low position. Call   [facility] 

      light in reach. Side rails up X2.                                                           

18:47 No provider procedures requiring assistance completed.                                  ss7 

19:10 Juan Luis Lerner MD is Attending Physician.                                              kdr 

19:15 Pulse ox on. NIBP on.                                                                   tk1 

21:00 Shuntogram XRAY In Process Unspecified.                                                 EDMS

21:14 CT Head Brain wo Cont In Process Unspecified.                                           EDMS

21:30 Inserted saline lock: 22 gauge in right forearm, using aseptic technique. Missed        tk1 

      attempt(s): 22 gauge in left forearm.                                                       

22:44 IV discontinued, intact, bleeding controlled, No redness/swelling at site. Pressure     tk1 

      dressing applied.                                                                           

                                                                                                  

Administered Medications:                                                                         

21:35 Drug: Phenergan (promethazine) 25 mg Route: IVP; Rate: 6.25 mg/min; Infused Over: 4     tk1 

      mins; Site: right femoral;                                                                  

22:20 Follow up: Response: Pain is decreased                                                  tk1 

21:40 Drug: Dilaudid (HYDROmorphone) 1 mg Route: IVP; Rate: 0.5 mg/min; Infused Over: 2 mins; tk1 

      Site: right forearm;                                                                        

22:20 Follow up: Pain 2/10; Response: Pain is decreased                                       tk1 

21:50 Drug: Pepcid (famotidine) 20 mg Route: IVP; Rate: 10 mg/min; Infused Over: 2 mins;      tk1 

      Site: right forearm;                                                                        

22:30 Follow up: Response: Pain is decreased                                                  tk1 

                                                                                                  

                                                                                                  

Outcome:                                                                                          

22:32 Discharge ordered by MD.                                                                kdr 

22:49 Discharged to home via ambulance.                                                       tk1 

22:49 Condition: stable                                                                           

22:49 Discharge instructions given to patient, Instructed on discharge instructions, follow       

      up and referral plans. Demonstrated understanding of instructions, follow-up care.          

23:20 Patient left the ED.                                                                    tk1 

                                                                                                  

Signatures:                                                                                       

Dispatcher [employer]                           EDMS                                                 

Juan Luis Lerner MD MD kdr Botello, Elizabeth eb Kirby, Tammie                                tk1                                                  

Elal Diaz RN                        RN   ss7                                                  

                                                                                                  

Corrections: (The following items were deleted from the chart)                                    

21:53 21:49 Inserted saline lock: 22 gauge in right forearm, using aseptic technique. Missed  tk1 

      attempt(s): 22 gauge in left forearm. tk1                                                   

                                                                                                  

**************************************************************************************************
Attending Only

## 2022-03-19 VITALS — OXYGEN SATURATION: 92 % | SYSTOLIC BLOOD PRESSURE: 115 MMHG | DIASTOLIC BLOOD PRESSURE: 61 MMHG

## 2022-03-19 VITALS — TEMPERATURE: 98.1 F

## 2022-03-24 ENCOUNTER — HOSPITAL ENCOUNTER (EMERGENCY)
Dept: HOSPITAL 97 - ER | Age: 37
Discharge: ANOTHER HOSPITAL | End: 2022-03-24
Payer: COMMERCIAL

## 2022-03-24 VITALS — OXYGEN SATURATION: 98 % | DIASTOLIC BLOOD PRESSURE: 102 MMHG | SYSTOLIC BLOOD PRESSURE: 146 MMHG

## 2022-03-24 VITALS — TEMPERATURE: 98.9 F

## 2022-03-24 DIAGNOSIS — Z88.0: ICD-10-CM

## 2022-03-24 DIAGNOSIS — Z98.2: ICD-10-CM

## 2022-03-24 DIAGNOSIS — Z88.1: ICD-10-CM

## 2022-03-24 DIAGNOSIS — Z20.822: ICD-10-CM

## 2022-03-24 DIAGNOSIS — Z72.0: ICD-10-CM

## 2022-03-24 DIAGNOSIS — R51.9: Primary | ICD-10-CM

## 2022-03-24 DIAGNOSIS — D72.829: ICD-10-CM

## 2022-03-24 DIAGNOSIS — Z88.5: ICD-10-CM

## 2022-03-24 DIAGNOSIS — E66.9: ICD-10-CM

## 2022-03-24 DIAGNOSIS — Z88.8: ICD-10-CM

## 2022-03-24 DIAGNOSIS — Q05.9: ICD-10-CM

## 2022-03-24 DIAGNOSIS — Z88.3: ICD-10-CM

## 2022-03-24 LAB
ALBUMIN SERPL BCP-MCNC: 3.7 G/DL (ref 3.4–5)
ALP SERPL-CCNC: 114 U/L (ref 45–117)
ALT SERPL W P-5'-P-CCNC: 47 U/L (ref 12–78)
AST SERPL W P-5'-P-CCNC: 17 U/L (ref 15–37)
BUN BLD-MCNC: 15 MG/DL (ref 7–18)
GLUCOSE SERPLBLD-MCNC: 84 MG/DL (ref 74–106)
HCT VFR BLD CALC: 54 % (ref 39.6–49)
LYMPHOCYTES # SPEC AUTO: 2.5 K/UL (ref 0.7–4.9)
PMV BLD: 8.5 FL (ref 7.6–11.3)
POTASSIUM SERPL-SCNC: 4 MMOL/L (ref 3.5–5.1)
RBC # BLD: 6.13 M/UL (ref 4.33–5.43)
SARS-COV-2 RNA RESP QL NAA+PROBE: NEGATIVE

## 2022-03-24 PROCEDURE — 99285 EMERGENCY DEPT VISIT HI MDM: CPT

## 2022-03-24 PROCEDURE — 75809 NONVASCULAR SHUNT X-RAY: CPT

## 2022-03-24 PROCEDURE — 49427 INJECTION ABDOMINAL SHUNT: CPT

## 2022-03-24 PROCEDURE — 36415 COLL VENOUS BLD VENIPUNCTURE: CPT

## 2022-03-24 PROCEDURE — 80053 COMPREHEN METABOLIC PANEL: CPT

## 2022-03-24 PROCEDURE — 70450 CT HEAD/BRAIN W/O DYE: CPT

## 2022-03-24 PROCEDURE — 85025 COMPLETE CBC W/AUTO DIFF WBC: CPT

## 2022-03-24 PROCEDURE — 96374 THER/PROPH/DIAG INJ IV PUSH: CPT

## 2022-03-24 PROCEDURE — 87088 URINE BACTERIA CULTURE: CPT

## 2022-03-24 PROCEDURE — 0240U: CPT

## 2022-03-24 PROCEDURE — 87186 SC STD MICRODIL/AGAR DIL: CPT

## 2022-03-24 PROCEDURE — 81003 URINALYSIS AUTO W/O SCOPE: CPT

## 2022-03-24 PROCEDURE — 87077 CULTURE AEROBIC IDENTIFY: CPT

## 2022-03-24 PROCEDURE — 87086 URINE CULTURE/COLONY COUNT: CPT

## 2022-03-24 NOTE — RAD REPORT
EXAM DESCRIPTION:  RAD - Shuntogram - 3/24/2022 9:46 pm

 

CLINICAL HISTORY:  PAIN

 

COMPARISON:  Shuntogram dated 3/18/2022

 

FINDINGS:  Right-sided shunt tubing is noted. This traverses the chest from right to left. The tubing
 the abdomen on the left aspect. No kink or break is seen. Several old right-sided shunt tubes are al
so present.

## 2022-03-24 NOTE — XMS REPORT
Continuity of Care Document

                            Created on:2022



Patient:JORDAN VERDIN JR

Sex:Male

:1985

External Reference #:[id number]





Demographics







                          Address                   [address]



                                                    [place] [address]

 

                          Home Phone                [phone]

 

                          Work Phone                [phone]

 

                          Mobile Phone              [phone]

 

                          Email Address             DECLINED

 

                          Preferred Language        English

 

                          Marital Status            Unknown

 

                          Hinduism Affiliation     Unknown

 

                          Race                      Unknown

 

                          Additional Race(s)        Black or 



                                                    Unavailable

 

                          Ethnic Group              Unknown









Author







                          Organization              [facility]

t

 

                          Address                   [facility]



                                                    [place] [address]

 

                          Phone                     [phone]









Support







                Name            Relationship    Address         Phone

 

                Chito          Mother          [address] [phone]



                                                [place] [address] 

 

                one else per patient Unavailable     Unavailable     Unavailable

 

                Chito          Unavailable     [address] [phone]



                                                [place] [address] 

 

                Chito          Unavailable     Unavailable     [phone]

 

                Chito Pressley       Unavailable     [phone]



                                                [place] [address] 

 

                PATIENT,  ONE ELSE PER Unavailable     Unavailable     Unavailab

le

 

                Chito          Mother          [address] [phone]

[phone]



                                                [place] [address] 









Care Team Providers







                    Name                Role                Phone

 

                    ILEANA HILTON           Primary Care Physician Unavailable

 

                    Jesusita               Attending Clinician Unavailable

 

                    ELENA MCKEON      Attending Clinician Unavailable

 

                    Elena Carver  Attending Clinician [phone]

 

                    Thomas BOYD           Attending Clinician [phone]

 

                    DARWIN GARAY          Attending Clinician Unavailable

 

                    DARWIN Garay MD       Attending Clinician [phone]

 

                    Leo MEADOWS          Attending Clinician [phone]

 

                    MAXIM VILLASEÑOR          Attending Clinician Unavailable

 

                    MAXIM Woodard      Attending Clinician [phone]

 

                    VIV MARIA          Attending Clinician Unavailable

 

                    VIV Maria NP       Attending Clinician [phone]

 

                    Only,  Db Test      Attending Clinician Unavailable

 

                    Mario Alberto MEADOWS             Attending Clinician [phone]

 

                    MARIO ALBERTO                Attending Clinician Unavailable

 

                    SINGER              Attending Clinician Unavailable

 

                    Singer DO           Attending Clinician [phone]

 

                    Daisy JACOBSON,  F    Attending Clinician [phone]

 

                    TWAN NAJERA        Attending Clinician Unavailable

 

                    SILVESTRE             Attending Clinician Unavailable

 

                    RALPH TORRES         Attending Clinician Unavailable

 

                    ELENA MCKEON      Admitting Clinician Unavailable

 

                    LEO             Admitting Clinician Unavailable

 

                    Leo MEADOWS          Admitting Clinician [phone]

 

                    ERUM  MAXIM          Admitting Clinician Unavailable

 

                    VIV MARIA          Admitting Clinician Unavailable

 

                    RANDALL              Admitting Clinician Unavailable

 

                    RALPH TORRES         Admitting Clinician Unavailable









Payers







           Payer Name Policy Type Policy Number Effective Date Expiration Date DARWIN barnes

 

           [facility]            [id number]  20[facility]            



                                            00:00:00              







Advance Directives







           Directive  Decision   Effective  Termination Comments   Source



                                 Date       Date                  

 

           Healthcare Agents on N/A                                         Univ

ersity



           FileNameRelationshipHealcare                                       

      of [place]



           Agent                                                  Medical



           RelationshipCommunicationLife[facility]                                             



           [phone]                                             



           (Mobile)[phone] (Work)                                          

   







Problems







       Condition Condition Condition Status Onset  Resolution Last   Treating Co

mments 

Source



       Name   Details Category        Date   Date   Treatment Clinician        



                                                 Date                 

 

       Complicate Complicate Disease Active                              U

nivers



       d urinary d urinary               1-20                               ity 

of



       tract  tract                00:00:                             [place]



       infection infection               00                                 [facility]

 

       Hyperglyce Hyperglyce Disease Active                              C

[facility]



       jarvis    jarvis                  1-07                               [facility]



       without without               00:00:                             Medical



       ketosis ketosis               00                                 Center

 

       HEADACHE        Diagnosis Active 2018               M

emoria



                                   08          09:20:00               l



                HEADACHE               00:00:                             [facility]

n



                                   00                                 



                                                                      



               Active                                                  



                                                                      



                                                                      



                                                                      



                                                                      



                                                                      



                                                                      



                                                                      



                                                                      



                                                                      



                                                                      



                    [facility]                                                  



                                                                      



                                                                      

 

       SHUNT         Diagnosis Active 2018               Mem

oria



       MALFUNCTIO                                20:00:00               l



       N        SHUNT               00:00:                             [facility]



              MALFUNCTIO               00                                 



              N                                                       



                                                                      



                 Active                                                  



                                                                      



                                                                      



              2018                                                  



                                                                      



                                                                      



                                                                      



                                                                      



                                                                      



                                                                      



                                                                      



                                                                      



                     [facility]                                                  



                                                                      



                                                                      

 

       ACUTE         Diagnosis Active 2018               Mem

oria



       HEADACHE                      -          09:20:00               l



                ACUTE               00:00:                             [facility]



              HEADACHE               00                                 



                                                                      



                                                                      



              Active                                                  



                                                                      



                                                                      



              2018                                                  



                                                                      



                                                                      



                                                                      



                                                                      



                                                                      



                                                                      



                                                                      



                                                                      



                     [facility]                                                  



                                                                      



                                                                      

 

       Spina  Spina  Disease Active                              [facility]



       bifida bifida                                              [facility]



                                   00:00:                             Medical



                                   00                                 [place]

 

       Pyelonephr Pyelonephr Disease Active                              C

[facility]



       itis   itis                 6                               [facility]



                                   00:00:                             [facility]



                                   00                                 [place]

 

       Morbid Morbid Disease Active                              [facility]



       obesity obesity               1-04                               ity of



       with body with body               00:00:                             [place]

[place]



       mass index mass index               00                                 Me

dical



       of 50 or of 50 or                                                  Branch



       higher higher                                                  

 

       Morbid Morbid Disease Active 2017-0                             [facility]



       obesity obesity               1-04                               ity of



       with body with body               00:00:                             [place]

s



       mass index mass index               00                                 Me

dical



       of     of                                                      Branch



       40.0-49.9 40.0-49.9                                                  

 

       Spina         Problem                      2019               Memor

ia



       bifida,                                    14:14:02               l



       unspecifie   Spina                                                  [place]

nn



       d      bifida,                                                  



              unspecifie                                                  



              d                                                       



                                                                      



                                                                      



                                                                      



                                                                      



                                                                      



                                                                      



                                                                      



                                                                      



              2019                                                  



                                                                      



                                                                      



                                                                      



                                                                      



                 [facility]                                                  



                                                                      



                                                                      

 

       Nausea        Problem                      2019               Memor

ia



       with                                      14:14:02               l



       vomiting,   Nausea                                                  [place]

nn



       unspecifie with                                                    



       d      vomiting,                                                  



              unspecifie                                                  



              d                                                       



                                                                      



                                                                      



                                                                      



                                                                      



                                                                      



                                                                      



                                                                      



                                                                      



              2019                                                  



                                                                      



                                                                      



                                                                      



                                                                      



                 [facility]                                                  



                                                                      



                                                                      

 

       Diplopia        Problem                      2019               Mem

oria



                                                 14:14:02               l



                Diplopia                                                  Miguel

n



                                                                      



                                                                      



                                                                      



                                                                      



                                                                      



                                                                      



                                                                      



                                                                      



                                                                      



              2019                                                  



                                                                      



                                                                      



                                                                      



                                                                      



                 [facility]                                                  



                                                                      



                                                                      

 

       Cerebral        Problem                      2019               Mem

oria



       palsy,                                    14:14:02               l



       unspecifie   Cerebral                                                  He

rmann



       d      palsy,                                                  



              unspecifie                                                  



              d                                                       



                                                                      



                                                                      



                                                                      



                                                                      



                                                                      



                                                                      



                                                                      



                                                                      



              2019                                                  



                                                                      



                                                                      



                                                                      



                                                                      



                 [facility]                                                  



                                                                      



                                                                      

 

       Acquired        Problem                      2019               Mem

oria



       absence of                                    14:14:02               l



       other    Acquired                                                  [facility]

n



       specified absence of                                                  



       parts of other                                                   



       digestive specified                                                  



       tract  parts of                                                  



              digestive                                                  



              tract                                                   



                                                                      



                                                                      



                                                                      



                                                                      



                                                                      



                                                                      



                                                                      



                                                                      



                                                                      



              2019                                                  



                                                                      



                                                                      



                                                                      



                                                                      



                 [facility]                                                  



                                                                      



                                                                      

 

       Nicotine        Problem                      2019               Mem

oria



       dependence                                    14:14:02               l



       ,        Nicotine                                                  Miguel

n



       cigarettes dependence                                                  



       ,      ,                                                       



       uncomplica cigarettes                                                  



       huma    ,                                                       



              uncomplica                                                  



              huma                                                     



                                                                      



                                                                      



                                                                      



                                                                      



                                                                      



                                                                      



                                                                      



                                                                      



              2019                                                  



                                                                      



                                                                      



                                                                      



                                                                      



                 [facility]                                                  



                                                                      



                                                                      

 

       Presence        Problem                      2019               Mem

oria



       of                                        14:14:02               l



       cerebrospi   Presence                                                  He

rmann



       nal fluid of                                                      



       drainage cerebrospi                                                  



       device nal fluid                                                  



              drainage                                                  



              device                                                  



                                                                      



                                                                      



                                                                      



                                                                      



                                                                      



                                                                      



                                                                      



                                                                      



                                                                      



              2019                                                  



                                                                      



                                                                      



                                                                      



                                                                      



                 [facility]                                                  



                                                                      



                                                                      

 

       Allergy        Problem                      2019               [facility]

rajeev



       status to                                    14:14:02               l



       other    Allergy                                                  Jose



       antibiotic status to                                                  



       agents other                                                   



       status antibiotic                                                  



              agents                                                  



              status                                                  



                                                                      



                                                                      



                                                                      



                                                                      



                                                                      



                                                                      



                                                                      



                                                                      



                                                                      



              2019                                                  



                                                                      



                                                                      



                                                                      



                                                                      



                 [facility]                                                  



                                                                      



                                                                      

 

       Allergy        Problem                      2019               [facility]

rajeev



       status to                                    14:14:02               l



       other    Allergy                                                  Jose



       drugs, status to                                                  



       medicament other                                                   



       s and  drugs,                                                  



       biological medicament                                                  



       substances s and                                                   



       status biological                                                  



              substances                                                  



              status                                                  



                                                                      



                                                                      



                                                                      



                                                                      



                                                                      



                                                                      



                                                                      



                                                                      



                                                                      



              2019                                                  



                                                                      



                                                                      



                                                                      



                                                                      



                 [facility]                                                  



                                                                      



                                                                      

 

       Allergy        Problem                      2019               [facility]

rajeev



       status to                                    14:14:02               l



       narcotic   Allergy                                                  [place]

nn



       agent  status to                                                  



       status narcotic                                                  



              agent                                                   



              status                                                  



                                                                      



                                                                      



                                                                      



                                                                      



                                                                      



                                                                      



                                                                      



                                                                      



                                                                      



              2019                                                  



                                                                      



                                                                      



                                                                      



                                                                      



                 [facility]                                                  



                                                                      



                                                                      

 

       Asthma        Problem Resolve               2019               [facility]

rajeev



       (disorder)               d                    01:05:55               l



                Asthma                                                  Jose



              (disorder)                                                  



                                                                      



                                                                      



               Resolved                                                  



                                                                      



                                                                      



                                                                      



                                                                      



                Problem                                                  



                                                                      



                                                                      



              2019                                                  



                                                                      



                                                                      



                                                                      



                                                                      



                 [facility]                                                  



                                                                      



                                                                      

 

       Bronchitis        Problem Resolve               2019               

[facility]



       (disorder)               d                    01:05:55               l



                                                                      Jose



              Bronchitis                                                  



              (disorder)                                                  



                                                                      



                                                                      



               Resolved                                                  



                                                                      



                                                                      



                                                                      



                                                                      



                Problem                                                  



                                                                      



                                                                      



              2019                                                  



                                                                      



                                                                      



                                                                      



                                                                      



                 [facility]                                                  



                                                                      



                                                                      

 

       Cerebral        Problem Resolve               2019               Me

moria



       palsy                d                    01:05:55               l



       (disorder)   Cerebral                                                  He

rmann



              palsy                                                   



              (disorder)                                                  



                                                                      



                                                                      



               Resolved                                                  



                                                                      



                                                                      



                                                                      



                                                                      



                Problem                                                  



                                                                      



                                                                      



              2019                                                  



                                                                      



                                                                      



                                                                      



                                                                      



                 [facility]                                                  



                                                                      



                                                                      

 

       Hydrocepha        Problem Resolve               2019               

[facility]



       ozzy                  d                    01:05:55               l



       (disorder)                                                         Miguel

n



              Hydrocepha                                                  



              ozzy                                                     



              (disorder)                                                  



                                                                      



                                                                      



               Resolved                                                  



                                                                      



                                                                      



                                                                      



                                                                      



                Problem                                                  



                                                                      



                                                                      



              2019                                                  



                                                                      



                                                                      



                                                                      



                                                                      



                 [facility]                                                  



                                                                      



                                                                      

 

       Osteomyeli        Problem Resolve               2019               

[facility]



       tis                  d                    01:05:55               l



       (disorder)                                                         Miguel

n



              Osteomyeli                                                  



              tis                                                     



              (disorder)                                                  



                                                                      



                                                                      



               Resolved                                                  



                                                                      



                                                                      



                                                                      



                                                                      



                Problem                                                  



                                                                      



                                                                      



              2019                                                  



                                                                      



                                                                      



                                                                      



                                                                      



                 [facility]                                                  



                                                                      



                                                                      

 

       Acute pain        Problem Active               2019               M

emoria



       (finding)                                    01:05:55               l



                Acute                                                  Jose



              pain                                                    



              (finding)                                                  



                                                                      



                                                                      



              Active                                                  



                                                                      



                                                                      



                                                                      



                                                                      



              Problem                                                  



                                                                      



                                                                      



              2019                                                  



                                                                      



                                                                      



                                                                      



                                                                      



                 [facility]                                                  



                                                                      



                                                                      

 

       Headache        Problem Active               2019               Mem

oria



       (finding)                                    01:05:55               l



                Headache                                                  Miguel

n



              (finding)                                                  



                                                                      



                                                                      



              Active                                                  



                                                                      



                                                                      



                                                                      



                                                                      



              Problem                                                  



                                                                      



                                                                      



              2019                                                  



                                                                      



                                                                      



                                                                      



                                                                      



                 [facility]                                                  



                                                                      



                                                                      







History of Past Illness







       Condition Condition Condition Status Onset  Resolution Last   Treating Co

mments 

Source



       Name   Details Category        Date   Date   Treatment Clinician        



                                                 Date                 

 

       Headache        Problem        2019              

 Memoria



                                   -08   14:14:02 14:14:02               l



                Headache               06:00:                             Miguel

n



                                   00                                 



                                                                      



                                                                      



                                                                      



                                                                      



              2018                                                  



                                                                      



                                                                      



                                                                      



                                                                      



                 [facility]                                                  



                                                                      



                                                                      







Allergies, Adverse Reactions, Alerts







       Allergy Allergy Status Severity Reaction(s) Onset  Inactive Treating Comm

ents 

Source



       Name   Type                        Date   Date   Clinician        

 

       Amoxicil Propensi Active        Hives                        [facility]

s



       bryn    ty to                                               ity of



              adverse                      00:00:                      [place]



              reaction                      00                          Medical



              s                                                       Branch

 

       AMOXICIL DRUG   Active        Hives                        [facility]



       BRYN    INGREDI                                              ity of



                                          00:00:                      [place]



                                          00                          Medical



                                                                      Branch

 

       Metoclop Propensi Active        Nausea 2017                      [facility]

s



       ramide ty to                and/or 2-20                        ity of



       Hcl    adverse               Vomiting 00:00:                      [place]



              reaction                      00                          Medical



              s                                                       Branch

 

       METOCLOP DRUG   Active        N/V    -                      [facility]



       RAMIDE INGREDI                      2-20                        ity of



       HCL                                00:00:                      [place]



                                          00                          Medical



                                                                      Branch

 

       Sulfamet Propensi Active        Hives  2017-0                      [facility]



       hoxazole ty to                       6-11                        [facility]



       [facility] adverse                      00:00:                      [facility]



       oprim  reaction                      00                          Center



              s                                                       

 

       Levoflox Propensi Active        Hives  2017-0                      [facility]



       acin   ty to                       6-11                        [facility]



              adverse                      00:00:                      [facility]



              reaction                      00                          Center



              s                                                       

 

       SULFAMET Allergy Active High   Hives  2017-0                      [facility]



       HOXAZOLE                             6-11                        [facility]



       [facility]                             00:00:                      [facility]



       OPRIM                              00                          Center

 

       LEVOFLOX Allergy Active High   Hives  2017-0                      [facility]



       ACIN                               6-11                        [facility]



                                          00[facility]00[facility]                      [facility]



                                          00                          Center

 

       MORPHINE Allergy Active High   Hives  2017-0                      [facility]



                                          6-11                        [facility]



                                          00[facility]                      [facility]



                                          00                          Center

 

       KETOROLA Allergy Active High   Rash   2017-                      [facility]



       C                                  6-11                        [facility]



                                          00:00:                      Medical



                                          00                          Center

 

       VANCOMYC Allergy Active High   Rash   2017-                      [facility]



       IN                                 6-11                        [facility]



       ANALOGUE                             00:00:                      Medical



       S                                  00                          Center

 

       SESAME Allergy Active        Hives  2017-0                      [facility]



       SEED                               6-11                        [facility]



                                          00[facility]                      [facility]



                                          00                          Center

 

       Morphine Propensi Active        Hives  -                      [facility]



              ty to                       6-11                        [facility]



              adverse                      00:00:                      Medical



              reaction                      00                          Center



              s                                                       

 

       ONDANSET Allergy Active        N\T\V  2017-                      [facility]



       EVIN HCL                             6-11                        [facility]



       (PF)                               00:00:                      Medical



                                          00                          Center

 

       Sesame Propensi Active        Hives  2017-0                      [facility]



       Seed   ty to                       6-11                        [facility]



              adverse                      00:00:                      Medical



              reaction                      00                          Center



              s                                                       

 

       Ketorola Propensi Active        Rash   2017-0                      [facility]



       c      ty to                       6-11                        [facility]



              adverse                      00:00:                      Medical



              reaction                      00                          Center



              s                                                       

 

       Vancomyc Propensi Active        Rash   2017-0                      [facility]



       in     ty to                       6-11                        [facility]



       Analogue adverse                      00:00:                      Medical



       s      reaction                      00                          Center



              s                                                       

 

       Ondanset Propensi Active        Nausea And 2017-0                      [facility]

[facility]



       evin Hcl ty to                Vomiting 6-11                        [facility]



       (Pf)   adverse                      00:00:                      Medical



              reaction                      00                          Center



              s                                                       

 

       Sulfa  Propensi Active        Other - See                       Uni

vers



       (Sulfona ty to                comments 1-04                        ity of



       mide   adverse                      00:00:                      [place]



       Antibiot reaction                      00                          Medica

l



       ics)   s                                                       Branch

 

       Sulfamet Propensi Active        Other - See                       U

nivers



       hoprim ty to                comments 1-04                        ity of



       Ds     adverse                      00:00:                      [place]



              reaction                      00                          Medical



              s                                                       Branch

 

       Vancomyc Propensi Active        Other - See                       U

nivers



       in     ty to                comments 1-                        ity of



              adverse                      00:00:                      [place]



              reaction                      00                          Medical



              s                                                       Branch

 

       SULFA  Drug   Active        Other-Cmnt                       Univer

s



       (SULFONA Class                       1-04                        ity of



       MIDE                               00:00:                      [place]



       ANTIBIOT                             00                          Medical



       ICS)                                                           Branch

 

       SULFAMET DRUG   Active        Other-Cmnt                       Univ

ers



       HOPRIM                             1                        ity of



       DS                                 00:00:                      [place]



                                          00                          Medical



                                                                      Branch

 

       VANCOMYC DRUG   Active        Other-Cmnt                       Univ

ers



       IN     INGREDI                      1-                        ity of



                                          00:00:                      [place]



                                          00                          Medical



                                                                      Branch

 

       Sulfamet Propensi Active        Rash   -0                      Univer

s



       hoxazole ty to                       7-19                        ity of



              adverse                      00:00:                      [place]



              reaction                      00                          Medical



              s                                                       Branch

 

       Ondanset Propensi Active        Nausea 0                      Univer

s



       evin Hcl ty to                and/or 7-19                        ity of



       (Pf)   adverse               Vomiting 00:00:                      [place]



              reaction                      00                          Medical



              s                                                       Branch

 

       SULFAMET DRUG   Active        Rash   -0                      [facility]



       HOXAZOLE INGREDI                      7-19                        ity of



                                          00:00:                      [place]



                                          00                          Medical



                                                                      Branch

 

       ONDANSET DRUG   Active        N/V    -0                      Univers



       EVIN HCL                             7-19                        ity of



       (PF)                               00:00:                      [place]



                                          00                          Medical



                                                                      Branch

 

       Levoflox Propensi Active        Hives  -0                      Univer

s



       acin   ty to                       5-23                        ity of



              adverse                      00:00:                      [place]



              reaction                      00                          Medical



              s                                                       Branch

 

       Morphine Propensi Active        Hives  -0                      Univer

s



              ty to                       5-23                        ity of



              adverse                      00:00:                      [place]



              reaction                      00                          Medical



              s                                                       Branch

 

       Ketorola Propensi Active        Nausea -0                      Univer

s



       c      ty to                and/or 5-23                        ity of



       Trometha adverse               Vomiting 00:00:                      [place]



       mine   reaction                      00                          Medical



              s                                                       Branch

 

       LEVOFLOX DRUG   Active        Hives  0                      Univers



       ACIN   INGREDI                      5-23                        ity of



                                          00:00:                      [place]



                                          00                          Medical



                                                                      Branch

 

       MORPHINE DRUG   Active        Hives  -0                      Univers



              INGREDI                      5-23                        ity of



                                          00:00:                      [place]



                                          00                          [facility]

 

       KETOROLA DRUG   Active        N/V                          Univers



       C      INGREDI                                              ity of



       TROMETHA                             00:00:                      [facility]                                                         [facility]

 

       Morphine Adverse Active        Info Not                             [facility]

t



       Sulfate Reaction               Available                             [facility]

s -



       ER                                                             [facility]



                                                                      l



                                                                      Outpati



                                                                      ent



                                                                      Clinics

 

       Levaquin Adverse Active        Info Not                             [facility]

[facility]



              Reaction               Available                             [facility]

 -



                                                                      [facility]



                                                                      l



                                                                      Outpati



                                                                      ent



                                                                      Clinics

 

       Zofran Adverse Active        Info Not                             [facility]



              Reaction               Available                             [facility]

 -



                                                                      [facility]



                                                                      l



                                                                      Outpati



                                                                      ent



                                                                      Clinics

 

       Minocin Minocin Active                                           [facility]



                                                                      l



                                                                      [facility]

 

       Zofran Zofran Active                                           [facility]



                                                                      l



                                                                      Jose

 

       Levaquin Levaquin Active                                           [facility]

a



                                                                      l



                                                                      [employer]

 

       Bactrim Bactrim Active                                           [facility]



                                                                      l



                                                                      [employer]

 

       amoxicil amoxicil Active                                           [facility]

a



       bryn    bryn                                                     l



                                                                      [employer]

 

       morphine morphine Active                                           [facility]

a



                                                                      l



                                                                      [facility]

 

       Toradol Toradol Active                                           [facility]



                                                                      l



                                                                      Jose







Social History







           Social Habit Start Date Stop Date  Quantity   Comments   Source

 

           History of tobacco                       Cigarette Smoker            

[facility] -



           use                                                    [facility]

 

           Exposure to                       Not sure              [facility]



           SARS-CoV-2 (event)                                             [facility]

 

           Alcohol intake 2022 0 /d                  [facility]

 



                      00:00:00   00:00:00                         [facility]

 

           Cigarettes smoked 2017                       [facility] -



           current (pack per 00:00:00   00:00:00                         Medical

 Center



           day) - Reported                                             

 

           Tobacco use and 2017 Never used            [facility]

y of



           exposure   00:00:00   00:00:00                         [facility]

 

           Sex Assigned At 1985                       [facility]

y of



           Birth      00:00:00   00:00:00                         [facility]









                Smoking Status  Start Date      Stop Date       Source

 

                Current every day smoker 2017 00:00:00                 Uni

versity of [facility]







Medications







       Ordered Filled Start  Stop   Current Ordering Indication Dosage Frequency

 Signature

                    Comments            Components          Source



     Medication Medication Date Date Medication? Clinician                (SIG) 

          



     Name Name                                                   

 

     butalbital-      2022- No             2{tbl}      2 tablet,         

  [facility]



     acetaminoph      3-18 03-18                          Oral,           ity of



     en-caff      03:45: 03:10                          ONCE, 1           [place]



     (ESGIC)      00   :00                           dose, On           Medical



     -40                                         Thu            Branch



     mg tablet 2                                         3/17/22 at           



     tablet                                         2245, ASAP           

 

     NaCl 0.9%      2022- No             500mL      at 999           Univ

ers



     (NS) bolus      3-18 03-18                          mL/hr, 500           it

y of



     infusion      02:30: 03:17                          mL, IV           [place]



     500 mL      00   :00                           Infusion,           Medical



                                                  ONCE, 1           Branch



                                                  dose, On           



                                                  u            



                                                  3/17/22 at           



                                                  2130, STAT           

 

     diphenhydrA      2022- No             25mg      25 mg,           Uni

vers



     MINE      3-18 03-18                          Slow IV           ity of



     (BENADRYL)      02:30: 01:46                          Push,           [place]



     injection      00   :00                           ONCE, 1           Medical



     25 mg                                         dose, On           Branch



                                                  u            



                                                  3/17/22 at           



                                                  2130, STAT           

 

     magnesium      2022- No             2g        2 g, IV           Univ

ers



     sulfate in      3-18 03-18                          Piggyback,           it

y of



     water 2      02:15: 03:17                          Administer           Eric

as



     gram/50 mL      00   :00                           over 30           Medica

l



     (4 %)                                         Minutes,           Branch



     infusion 2                                         ONCE, 1           



     g                                            dose, On           



                                                  u            



                                                  3/17/22 at           



                                                  2115,           



                                                  Routine           

 

     HYDROmorpho      2022- No             .5mg      0.5 mg,           Un

yoselyn



     ne                                  Slow IV           ity of



     (DILAUDID)      01:30: 01:25                          Push,           [place]



     injection      00   :00                           ONCE, 1           Medical



     0.5 mg                                         dose, On           Branch



                                                  Tue            



                                                  22 at           



                                                  1930,           



                                                  Routine<br           



                                                  >Use           



                                                  approved           



                                                  by             



                                                  (Faculty):           



                                                  ADC            



                                                  PROVIDER           

 

     levoFLOXaci      2022- Yes       [id number] 750mg      Take 1        

   [facility]



     n 750 mg                                tablet by           ity o

f



     tablet      00:00: 05:59                          mouth           [place]



               00   :00                           daily for           Medical



                                                  4 days.           Branch

 

     levoFLOXaci      2022- Yes       [id number] 750mg      Take 1        

   [facility]



     n 750 mg                                tablet by           ity o

f



     tablet      00:00: 05:59                          mouth           [place]



               00   :00                           daily for           Medical



                                                  4 days.           Branch

 

     OXYCODONE            Yes                      Take  by           [facility]

ers



     HCL/ACETAMI      1-25                               mouth.           ity of



     NOPHEN      19:49:                                              [place]



     (PERCOCET      27                                                Medical



     ORAL)                                                        Branch

 

     OXYCODONE            Yes                      Take  by           [facility]

ers



     HCL/ACETAMI      1-25                               mouth.           ity of



     NOPHEN      19:49:                                              [place]



     (PERCOCET      27                                                Medical



     ORAL)                                                        [place]

 

     OXYCODONE            Yes                      Take  by           [facility]

ers



     HCL/ACETAMI      -25                               mouth.           ity of



     NOPHEN      19:49:                                              [place]



     (PERCOCET      27                                                Medical



     ORAL)                                                        [place]

 

     vancomycin      2022- Yes            125mg      125 mg,           Un

yoselyn



     (VANCOCIN)                                Oral, QID,           it

y of



     capsule 125      18:00: 21:59                          25 doses,           

[place]



     mg        00   :00                           First dose           Medical



                                                  (after           Branch



                                                  last           



                                                  modificati           



                                                  on) on 22 at           



                                                  1200, Last           



                                                  dose on           



                                                  22 at           



                                                  1200,           



                                                  ASAP<br>Fa           



                                                  culty           



                                                  member           



                                                  approving           



                                                  Non-formul           



                                                  maryanne            



                                                  medication           



                                                  :              



                                                  MEGADC<br&           



                                                  gt;Reason           



                                                  for            



                                                  non-formul           



                                                  maryanne use:           



                                                  SPECIFIC           



                                                  INDICATION           



                                                  FOR            



                                                  NONFORMULA           



                                                  RY             



                                                  PRODUCT<br           



                                                  >Reason           



                                                  for            



                                                  Anti-Infec           



                                                  tive:           



                                                  Documented           



                                                  Infection<           



                                                  br>Documen           



                                                  huma            



                                                  Infection           



                                                  Site:           



                                                  Abdominal<           



                                                  br>Duratio           



                                                  n of           



                                                  Therapy:           



                                                  14 days           

 

     levoFLOXaci            Yes            750mg      750 mg,           Un

yoselyn



     n         -25                               Oral,           ity of



     (LEVAQUIN)      15:00:                               DAILY,           [place]



     tablet 750      00                                 First dose           Med

ical



     mg                                           (after           [facility]



                                                  last           



                                                  modificati           



                                                  on) on 22 at           



                                                  0900,           



                                                  Until           



                                                  Discontinu           



                                                  ed,            



                                                  ASAP<br>Re           



                                                  ason for           



                                                  Anti-Infec           



                                                  tive:           



                                                  Empiric           



                                                  Therapy           



                                                  for            



                                                  Suspected           



                                                  Infection<           



                                                  br>Empiric           



                                                  Therapy           



                                                  Site:           



                                                  Urine<br>D           



                                                  uration of           



                                                  therapy:           



                                                  72 hours           

 

     lactobacill      2022- Yes       [id number] .5mg      Take 1         

  [facility]



     us        -25                          tablet by           ity of



     acidophilus      00:00: 05:59                          mouth 2           [facility]

[place]



               00   :00                           (two)           [facility]



                                                  times           [facility]



                                                  daily for           



                                                  30 days.           

 

     lactobacill      2022- Yes       [id number] .5mg      Take 1         

  [facility]



     us        --25                          tablet by           ity of



     acidophilus      00:00: 05:59                          mouth 2           [facility]

[place]



               00   :00                           (two)           [facility]



                                                  times           [facility]



                                                  daily for           



                                                  30 days.           

 

     vancomycin      2022- Yes       [id number] 125mg      Take 1         

  [facility]



     125 mg                                capsule by           ity of



     capsule      00:00: 05:59                          mouth 4           [place]



               00   :00                           (four)           Medical



                                                  times           [facility]



                                                  daily for           



                                                  5 days.           

 

     vancomycin      2022- Yes       [id number] 125mg      Take 1         

  [facility]



     125 mg                                capsule by           ity of



     capsule      00:00: 05:59                          mouth 4           [place]



               00   :00                           (four)           Medical



                                                  times           [facility]



                                                  daily for           



                                                  5 days.           

 

     traMADoL            Yes            50mg      50 mg,           [facility]



     (ULTRAM)                                     Oral,           ity of



     tablet 50      21:26:                               Q6HPRN,           [place]



     mg        10                                 Starting           Medical



                                                  on Mon           [facility]



                                                  22 at           



                                                  1526,           



                                                  Until           



                                                  Discontinu           



                                                  ed,            



                                                  Routine,           



                                                  Pain           



                                                  (scale           



                                                  4-6)           

 

     levoFLOXaci      2022- No             250mg      250 mg,           U

n[facility]



     n                                   Oral, Q24H           ity of



     (LEVAQUIN)      19:30: 23:18                          ABX, First           

[place]



     tablet 250      00   :17                           dose           Medical



     mg                                           (after           [facility]



                                                  last           



                                                  modificati           



                                                  on) on 22 at           



                                                  1330,           



                                                  Until           



                                                  Discontinu           



                                                  ed,            



                                                  ASAP<br>Re           



                                                  ason for           



                                                  Anti-Infec           



                                                  tive:           



                                                  Empiric           



                                                  Therapy           



                                                  for            



                                                  Suspected           



                                                  Infection<           



                                                  br>Empiric           



                                                  Therapy           



                                                  Site:           



                                                  Urine<br>D           



                                                  uration of           



                                                  therapy:           



                                                  72 hours           

 

     HYDROmorpho      2022- No             .5mg      0.5 mg,           [facility]

[facility]



     ne                                  Slow IV           ity of



     (DILAUDID)      20:45: 18:23                          Push,           [place]



     injection      00   :44                           Q6HPRN,           Medical



     0.5 mg                                         Starting           [facility]



                                                  on Sun           



                                                  22 at           



                                                  1445,           



                                                  Until 22 at           



                                                  1223,           



                                                  Routine,           



                                                  Pain           



                                                  (scale           



                                                  7-10),           



                                                  breakthrou           



                                                  gh             



                                                  pain<br>Us           



                                                  e approved           



                                                  by             



                                                  (Faculty):           



                                                  ADC            



                                                  PROVIDER           

 

     oxyCODONE-a            Yes            2{tbl}      2 tablet,          

 [facility]



     cetaminophe                                     Oral,           ity of



     n         20:39:                               Q6HPRN,           [place]



     (PERCOCET)      03                                 Starting           Medic

al



     5-325 mg                                         on Sun           [facility]



     per tablet                                         22 at           



     2 tablet                                         1439,           



                                                  Until           



                                                  Discontinu           



                                                  ed,            



                                                  Routine,           



                                                  Pain           



                                                  (scale           



                                                  7-10)           

 

     HYDROmorpho      2022- No             .5mg      0.5 mg,           Un

[place]



     ne                                  Slow IV           ity of



     (DILAUDID)      00:00: 23:41                          Push,           [place]



     injection      00   :00                           ONCE, 1           Medical



     0.5 mg                                         dose, On           [facility]



                                                  Sat            



                                                  22 at           



                                                  1800,           



                                                  Routine<br           



                                                  >Use           



                                                  approved           



                                                  by             



                                                  (Faculty):           



                                                  ADC            



                                                  PROVIDER           

 

     ertapenem      2022- No             1000mg      1,000 mg,           

[facility]



     (INVANZ)                                IV             ity of



     1,000 mg in      15:45: 18:30                          Piggyback,          

 [place]



     NaCl 0.9%      00   :00                           Q24H ABX,           Medic

al



     (NS) 50 mL                                         First dose           Bra

[facility]



     MINI-BAG                                         on Sat           



                                                  22 at           



                                                  0945,           



                                                  Until           



                                                  Discontinu           



                                                  ed,            



                                                  Administer           



                                                  over 30           



                                                  Minutes,           



                                                  50             



                                                  mL<br>Reas           



                                                  on for           



                                                  Anti-Infec           



                                                  tive:           



                                                  Empiric           



                                                  Therapy           



                                                  for            



                                                  Suspected           



                                                  Infection<           



                                                  br>Empiric           



                                                  Therapy           



                                                  Site:           



                                                  Urine<br>D           



                                                  uration of           



                                                  therapy:           



                                                  72             



                                                  hours<br>R           



                                                  estricted           



                                                  use            



                                                  approved           



                                                  by: ADC           



                                                  PROVIDER           

 

     lactobacill            Yes            .5mg      0.5 mg,           Uni

vers



     us                                       Oral, BID,           ity of



     acidophilus      02:00:                               First dose           

[place]



     tablet 0.5      00                                 on Fri           Medical



     mg                                           22 at           [facility]



                                                  2000,           



                                                  Until           



                                                  Discontinu           



                                                  ed,            



                                                  Routine           

 

     vancomycin      2022- No             250mg      250 mg,           Un

[facility]



     (VANCOCIN)                                Oral, QID,           it

y of



     capsule 250      02:00: 16:20                          First dose          

 [place]



     mg        00   :40                           (after           Medical



                                                  last           [facility]



                                                  reorder)           



                                                  on Fri           



                                                  22 at           



                                                  2000,           



                                                  Until           



                                                  Discontinu           



                                                  ed,            



                                                  ASAP&lt;br           



                                                  >Faculty           



                                                  member           



                                                  approving           



                                                  Non-formul           



                                                  maryanne            



                                                  medication           



                                                  :              



                                                  MEGADC<br>           



                                                  Reason for           



                                                  non-formul           



                                                  maryanne use:           



                                                  SPECIFIC           



                                                  INDICATION           



                                                  FOR            



                                                  NONFORMULA           



                                                  RY             



                                                  PRODUCT<br           



                                                  >Reason           



                                                  for            



                                                  Anti-Infec           



                                                  tive:           



                                                  Documented           



                                                  Infection<           



                                                  br>Documen           



                                                  huma            



                                                  Infection           



                                                  Site:           



                                                  Abdominal<           



                                                  br>Duratio           



                                                  n of           



                                                  Therapy:           



                                                  14 days           

 

     vancomycin      2022- No             250mg      250 mg,           Un

[place]



     (VANCOCIN)                                Oral,           ity of



     capsule 250      00:45: 00:34                          ONCE, 1           [facility]

[place]



     mg        00   :00                           dose, On           Medical



                                                  Fri            [facility]anch



                                                  22 at           



                                                  1845,           



                                                  ASAP<br>Fa           



                                                  culty           



                                                  member           



                                                  approving           



                                                  Non-formul           



                                                  maryanne            



                                                  medication           



                                                  :              



                                                  MEGADC<br>           



                                                  Reason for           



                                                  non-formul           



                                                  maryanne use:           



                                                  SPECIFIC           



                                                  INDICATION           



                                                  FOR            



                                                  NONFORMULA           



                                                  RY             



                                                  PRODUCT<br           



                                                  >Reason           



                                                  for            



                                                  Anti-Infec           



                                                  tive:           



                                                  Documented           



                                                  Infection<           



                                                  br>Documen           



                                                  huma            



                                                  Infection           



                                                  Site:           



                                                  Abdominal<           



                                                  br>Duratio           



                                                  n of           



                                                  Therapy:           



                                                  14 days           

 

     lidocaine      2022- No             5mL       5 mL,           Univer

s



     1% (PF)                                Subcutaneo           ity o

f



     (XYLOCAINE)      23:45: 02:25                          us, ONCE,           

[place]



     injection 5      00   :00                           1 dose, On           Me

dical



     mL                                           Fri            [facility]



                                                  22 at           



                                                  1745,           



                                                  Routine           

 

     NaCl 0.9%            Yes            10mL      10 mL,           Univer

s



     (NS)                                     Slow IV           ity of



     injection      23:38:                               Push, PRN,           [facility]

[place]



     10 mL      41                                 Starting           Medical



                                                  on Fri           [facility]



                                                  22 at           



                                                  1738,           



                                                  Until           



                                                  Discontinu           



                                                  ed,            



                                                  Routine,           



                                                  line           



                                                  maintenanc           



                                                  e              

 

     meropenem      2022- No             1g        1 g, IV           Univ

ers



     (MERREM)                           Piggyback,           it

y of



     g in NaCl      23:15: 14:33                          Q8H ABX,           [facility]

[facility]



     0.9% (NS)      00   :35                           First dose           Medi

[place]



     100 mL                                         (after           [facility]



     MINI-BAG                                         last           



                                                  modificati           



                                                  on) on Thu           



                                                  22 at           



                                                  1715,           



                                                  Until           



                                                  Discontinu           



                                                  ed,            



                                                  Administer           



                                                  over 60           



                                                  Minutes,           



                                                  100            



                                                  mL<br>Rest           



                                                  ricted use           



                                                  approved           



                                                  by: ADC           



                                                  PROVIDER<b           



                                                  r&gt;Reaso           



                                                  n for           



                                                  Anti-Infec           



                                                  tive:           



                                                  Documented           



                                                  Infection<           



                                                  br>Documen           



                                                  huma            



                                                  Infection           



                                                  Site:           



                                                  Urine<br>D           



                                                  uration of           



                                                  Therapy: 7           



                                                  days           

 

     enoxaparin            Yes            40mg      40 mg,           Unive

rs



     (LOVENOX)                                     Subcutaneo           ity 

of



     injection      23:00:                               us, DAILY,           [facility]

[place]



     40 mg      00                                 First dose           Medical



                                                  on Thu           [facility]



                                                  22 at           



                                                  1700,           



                                                  Until           



                                                  Discontinu           



                                                  ed,            



                                                  Routine           

 

     meropenem      2022- No             1g        1 g, IV           Univ

ers



     (MERREM)                           Piggyback,           it

y of



     g in NaCl      16:00: 20:33                          Q8H ABX,           Eric

as



     0.9% (NS)      00   :04                           First dose           Medi

sarah



     100 mL                                         (after           [facility]



     MINI-BAG                                         last           



                                                  reorder)           



                                                  on Thu           



                                                  22 at           



                                                  1000,           



                                                  Until           



                                                  Discontinu           



                                                  ed,            



                                                  Administer           



                                                  over 60           



                                                  Minutes,           



                                                  100            



                                                  mL<br>Rest           



                                                  ricted use           



                                                  approved           



                                                  by: [facility]           



                                                  PROVIDER<b           



                                                  r>Reason           



                                                  for            



                                                  Anti-Infec           



                                                  tive:           



                                                  Documented           



                                                  Infection<           



                                                  br>Documen           



                                                  huma            



                                                  Infection           



                                                  Site:           



                                                  Urine<br>D           



                                                  uration of           



                                                  Therapy:           



                                                  Other (see           



                                                  Comments)           

 

     HYDROmorpho      0 2022- No             .5mg      0.5 mg,           Un

[facility]



     ne                                  Slow IV           ity of



     (DILAUDID)      14:29: 20:41                          Push,           [place]



     injection      58   :17                           Q4HPRN,           Medical



     0.5 mg                                         Starting           [facility]



                                                  on u           



                                                  22 at           



                                                  0829,           



                                                  Until Sun           



                                                  22 at           



                                                  1441,           



                                                  Routine,           



                                                  Pain           



                                                  (scale           



                                                  7-10)<br>U           



                                                  se             



                                                  approved           



                                                  by             



                                                  (Faculty):           



                                                  ADC            



                                                  PROVIDER           

 

     proMETHazin            Yes            25mg      25 mg,           Univ

ers



     e         1-20                               Oral,           ity of



     (PHENERGAN)      09:42:                               Q4HPRN,           [facility]

as



     tablet 25      07                                 Starting           Medica

l



     mg                                           on Thu           [facility]



                                                  22 at           



                                                  0342,           



                                                  Until           



                                                  Discontinu           



                                                  ed,            



                                                  Routine,           



                                                  Nausea and           



                                                  Vomiting           



                                                  (N/V)           

 

     HYDROmorpho      -0 202- No             .5mg      0.5 mg,           Un

[facility]



     ne                                  Slow IV           ity of



     (DILAUDID)      09:41: 12:04                          Push,           [place]



     injection      36   :38                           Q4HPRN,           Medical



     0.5 mg                                         Starting           [facility]



                                                  on u           



                                                  22 at           



                                                  0341,           



                                                  Until u           



                                                  22 at           



                                                  0604,           



                                                  Routine,           



                                                  Pain           



                                                  (scale           



                                                  7-10)<br>U           



                                                  se             



                                                  approved           



                                                  by             



                                                  (Faculty):           



                                                  ADC            



                                                  PROVIDER           

 

     proCHLORper      0      Yes            10mg      10 mg,           Univ

ers



     azine      1-20                               Slow IV           ity of



     (COMPAZINE)      09:07:                               Push,           [place]



     injection      27                                 Q6HPRN,           Medical



     10 mg                                         Starting           [facility]



                                                  on Thu           



                                                  22 at           



                                                  0307,           



                                                  Until           



                                                  Discontinu           



                                                  ed,            



                                                  Routine,           



                                                  Nausea and           



                                                  Vomiting           



                                                  (N/V)           

 

     acetaminoph      0      Yes            650mg      650 mg,           Un

[facility]



     en        1-20                               Oral,           ity of



     (TYLENOL)      09:07:                               Q6HPRN,           [place]



     tablet 650      10                                 Starting           Medic

al



     mg                                           on Thu           [facility]



                                                  22 at           



                                                  0307,           



                                                  Until           



                                                  Discontinu           



                                                  ed,            



                                                  Routine,           



                                                  Pain           



                                                  (scale           



                                                  1-3)           

 

     meropenem      2022- No             1g        1 g, IV           Univ

ers



     (MERREM) 1                                Piggyback,           it

y of



     g in NaCl      07:15: 08:49                          ONCE, 1           [place]

[place]



     0.9% (NS)      00   :00                           dose, On           Medica

l



     100 mL                                         u            [facility]



     MINI-BAG                                         22 at           



                                                  0115,           



                                                  Administer           



                                                  over 60           



                                                  Minutes,           



                                                  100            



                                                  mL<br>Rest           



                                                  ricted use           



                                                  approved           



                                                  by: [facility]           



                                                  PROVIDER<b           



                                                  r>Reason           



                                                  for            



                                                  Anti-Infec           



                                                  tive:           



                                                  Documented           



                                                  Infection<           



                                                  br>Documen           



                                                  huma            



                                                  Infection           



                                                  Site:           



                                                  Urine<br>D           



                                                  uration of           



                                                  Therapy:           



                                                  Other (see           



                                                  Comments)           

 

     cefdinir      2022- No             300mg      300 mg,           Univ

ers



     (OMNICEF)                                Oral,           ity of



     capsule 300      03:30: 02:55                          ONCE, 1           [facility]

[place]



     mg        00   :00                           dose, On           Medical



                                                  Mon            [facility]



                                                  22 at           



                                                  2130,           



                                                  ASAP<br>Re           



                                                  ason for           



                                                  Anti-Infec           



                                                  tive:           



                                                  Documented           



                                                  Infection<           



                                                  br>Documen           



                                                  huma            



                                                  Infection           



                                                  Site:           



                                                  Urine<br>D           



                                                  uration of           



                                                  Therapy: 7           



                                                  days           

 

     FENTanyl PF      2022- No             50ug      50 mcg,           Un

[facility]



     (SUBLIMAZE                                Slow IV           ity o

f



     (PF))      01:15: 03:26                          Push,           [place]



     injection      00   :00                           ONCE, 1           Medical



     50 mcg                                         dose, On           [facility]



                                                  Mon            



                                                  22 at           



                                                  1915, STAT           

 

     proMETHazin      2022- No             25mg      25 mg, IV           

[facility]



     e                                   Piggyback,           ity of



     (PHENERGAN)      01:15: 03:26                          ONCE, 1           [facility]

[facility]



     25 mg in      00   :00                           dose, On           Medical



     NaCl 0.9%                                         Mon            [facility]



     (NS) 50 mL                                         22 at           



     piggyback                                         1915, 50           



                                                  mL             

 

     cefdinir      2022- Yes       [id number] 300mg      Take 1           U

nivers



     300 mg                                capsule by           ity of



     capsule      00:00: 05:59                          mouth           [place]



               00   :00                           every 12           Medical



                                                  (twelve)           [facility]



                                                  hours for           



                                                  10 days.           

 

     cefdinir      2022- No        [id number] 300mg      Take 1           U

nivers



     300 mg                                capsule by           ity of



     capsule      00:00: 00:00                          mouth           [place]



               00   :00                           every 12           Medical



                                                  (twelve)           [facility]



                                                  hours for           



                                                  10 days.           

 

     FENTanyl PF      2022- No             50ug      50 mcg,           Un

[facility]



     (SUBLIMAZE                                Slow IV           ity o

f



     (PF))      02:00: 01:19                          Push,           [place]



     injection      00   :00                           ONCE, 1           Medical



     50 mcg                                         dose, On           [facility]



                                                  Sat            



                                                  1/15/22 at           



                                                  ,           



                                                  Routine           

 

     methocarbam      2022- No             1000mg      1,000 mg,         

  [facility]



     oL                                  Oral,           ity of



     (ROBAXIN)      02:00: 01:19                          ONCE, 1           [place]

[facility]



     tablet      00   :00                           dose, On           Medical



     1,000 mg                                         Sat            [facility]



                                                  1/15/22 at           



                                                  2000, ASAP           

 

     proMETHazin      2022- No             12.5mg      12.5 mg,          

 [facility]



     e         1-15 01-15                          IV             ity of



     (PHENERGAN)      22:46: 23:00                          Piggyback,          

 [place]



     12.5 mg in      00   :00                           ONCE, 1           Medica

l



     NaCl 0.9%                                         dose, On           [facility]



     (NS) 50 mL                                         Sat            



     piggyback                                         1/15/22 at           



                                                  1700, 50           



                                                  mL             

 

     FENTanyl PF      2022- No             50ug      50 mcg,           Un

[facility]



     (SUBLIMAZE      1-15 01-15                          Slow IV           ity o

f



     (PF))      22:45: 23:00                          Push,           [place]



     injection      00   :00                           ONCE, 1           Medical



     50 mcg                                         dose, On           [facility]



                                                  Sat            



                                                  1/15/22 at           



                                                  1645,           



                                                  Routine           

 

     methocarbam      -0      Yes       [id number] 1000mg      Take 2         

  [facility]



     oL 500 mg      1-15                               tablets by           ity 

of



     tablet      00:00:                               mouth 4           [place]



               00                                 (four)           Medical



                                                  times           [facility]



                                                  daily as           



                                                  needed for           



                                                  Pain           



                                                  (scale           



                                                  1-3).           

 

     methocarbam      -0      Yes       [id number] 1000mg      Take 2         

  [facility]



     oL 500 mg      1-15                               tablets by           ity 

of



     tablet      00:00:                               mouth 4           [place]



               00                                 (four)           Medical



                                                  times           [facility]



                                                  daily as           



                                                  needed for           



                                                  Pain           



                                                  (scale           



                                                  1-3).           

 

     methocarbam      0      Yes       [id number] 1000mg      Take 2         

  [facility]



     oL 500 mg      1-15                               tablets by           ity 

of



     tablet      00:00:                               mouth 4           [place]



               00                                 (four)           Medical



                                                  times           [facility]



                                                  daily as           



                                                  needed for           



                                                  Pain           



                                                  (scale           



                                                  1-3).           

 

     methocarbam      0      Yes       [id number] 1000mg      Take 2         

  [facility]



     oL 500 mg      1-15                               tablets by           ity 

of



     tablet      00:00:                               mouth 4           [place]



               00                                 (four)           Medical



                                                  times           [facility]



                                                  daily as           



                                                  needed for           



                                                  Pain           



                                                  (scale           



                                                  1-3).           

 

     methocarbam      0      Yes       [id number] 1000mg      Take 2         

  [facility]



     oL 500 mg      1-15                               tablets by           ity 

of



     tablet      00:00:                               mouth 4           [place]



               00                                 (four)           Medical



                                                  times           [facility]



                                                  daily as           



                                                  needed for           



                                                  Pain           



                                                  (scale           



                                                  1-3).           

 

     proMETHazin      2021 No             25mg      25 mg, IV           

[facility]



     e         1-24                           Piggyback,           ity of



     (PHENERGAN)      23:15: 22:20                          ONCE, 1           [facility]

[facility]



     25 mg in      00   :00                           dose, On           Medical



     NaCl 0.9%                                         Wed            [facility]



     (NS) 50 mL                                         21           



     piggyback                                         at 1715,           



                                                  50 mL           

 

     FENTanyl PF      2021 No             75ug      75 mcg,           Un

[facility]



     (SUBLIMAZE                                Slow IV           ity o

f



     (PF))      22:15: 22:20                          Push,           [place]



     injection      00   :00                           ONCE, 1           Medical



     75 mcg                                         dose, On           [facility]



                                                  Wed            



                                                  21           



                                                  at 1615,           



                                                  Routine           

 

     fidaxomicin      2021      Yes       [id number] 200mg      Take 1          

 [facility]



     200 mg      1-24                               tablet by           ity of



     tablet      00:00:                               mouth 2           [place]



               00                                 (two)           Medical



                                                  times           [facility]



                                                  daily.           

 

     proMETHazin      2021      Yes       [id number] 25mg      Take 1           

[facility]



     e 25 mg      1-24                               tablet by           [facility]



     tablet      00:00:                               mouth           [place]



               00                                 every 6           Medical



                                                  (six)           [facility]



                                                  hours as           



                                                  needed for           



                                                  Nausea and           



                                                  Vomiting           



                                                  (N/V).           

 

     fidaxomicin      2021      Yes       [id number] 200mg      Take 1          

 [facility]



     200 mg      1-24                               tablet by           [facility]



     tablet      00:00:                               mouth 2           [place]



               00                                 (two)           Medical



                                                  times           [facility]



                                                  daily.           

 

     proMETHazin      2021      Yes       [id number] 25mg      Take 1           

[facility]



     e 25 mg      1-24                               tablet by           [facility]



     tablet      00:00:                               mouth           [place]



               00                                 every 6           Medical



                                                  (six)           [facility]



                                                  hours as           



                                                  needed for           



                                                  Nausea and           



                                                  Vomiting           



                                                  (N/V).           

 

     cholestyram      2021      Yes                                     [facility]

s



     ine 4 gram      1-24                                              ity [facility]



     packet      00:00:                                              [place]



                                                               [facility]



                                                                 [facility]

 

     fidaxomicin      2021      Yes       [id number] 200mg      Take 1          

 [facility]



     200 mg      1-24                               tablet by           [facility]



     tablet      00:00:                               mouth 2           [place]



               00                                 (two)           Medical



                                                  times           [facility]



                                                  daily.           

 

     proMETHazin      2021      Yes       [id number] 25mg      Take 1           

[facility]



     e 25 mg      1-24                               tablet by           [facility]



     tablet      00:00:                               mouth           [place]



               00                                 every 6           Medical



                                                  (six)           [facility]



                                                  hours as           



                                                  needed for           



                                                  Nausea and           



                                                  Vomiting           



                                                  (N/V).           

 

     cholestyram      2021      Yes                                     [facility]

s



     ine 4 gram      1-24                                              ity of



     packet      00:00:                                              [place]



                                                               [facility]



                                                                 [facility]

 

     cholestyram      2021      Yes                                     [facility]

s



     ine 4 gram      1-24                                              ity of



     packet      00:00:                                              [place]



                                                               [facility]



                                                                 [facility]

 

     cholestyram      2021      Yes                                     [facility]

s



     ine 4 gram      1-24                                              ity of



     packet      00:00:                                              [place]



                                                               [facility]



                                                                 [facility]anch

 

     cholestyram      2021      Yes                                     [facility]

s



     ine 4 gram      1-24                                              ity of



     packet      00:00:                                              [place]



                                                               [facility]



                                                                 [facility]anch

 

     fidaxomicin      2021      Yes       [id number] 200mg      Take 1          

 [facility]



     200 mg      1-24                               tablet by           [facility]



     tablet      00:00:                               mouth 2           [place]



                                                (two)           Medical



                                                  times           [facility]



                                                  daily.           

 

     proMETHazin      2021      Yes       [id number] 25mg      Take 1           

[facility]



     e 25 mg      1-24                               tablet by           ity of



     tablet      00:00:                               mouth           [place]



               00                                 every 6           Medical



                                                  (six)           [facility]



                                                  hours as           



                                                  needed for           



                                                  Nausea and           



                                                  Vomiting           



                                                  (N/V).           

 

     fidaxomicin      2021- No        [id number] 200mg      Take 1         

  [facility]



     200 mg      1-24 01-25                          tablet by           ity of



     tablet      00:00: 00:00                          mouth 2           [place]



               00   :00                           (two)           Medical



                                                  times           [facility]



                                                  daily.           

 

     proMETHazin      2021- No        [id number] 25mg      Take 1          

 [facility]



     e 25 mg      1-24 01-25                          tablet by           ity of



     tablet      00:00: 00:00                          mouth           [place]



               00   :00                           every 6           Medical



                                                  (six)           [facility]



                                                  hours as           



                                                  needed for           



                                                  Nausea and           



                                                  Vomiting           



                                                  (N/V).           

 

     FENTanyl PF      2021- No             50ug      50 mcg,           Un

[facility]



     (SUBLIMAZE      5-10 05-10                          Intravenou           it

y of



     (PF))      02:45: 01:34                          s, ONCE, 1           [place]



     injection      00   :00                           dose, Sun           Medic

al



     50 mcg                                         21 at           Branch



                                                  2145,           



                                                  Routine           

 

     cefTRIAXone      2021- No             1000mg      1,000 mg,         

  [facility]



     (ROCEPHIN)      5-10 05-10                          IV             ity of



     1,000 mg in      02:30: 01:55                          Piggyback,          

 [place]



     NaCl 0.9%      00   :00                           ONCE, 1           Medical



     (NS) 50 mL                                         dose, [facility]

ch



     MINI-BAG                                         21 at           



                                                  2130, 50           



                                                  mL<br>Reas           



                                                  on for           



                                                  Anti-Infec           



                                                  tive:           



                                                  Documented           



                                                  Infection<           



                                                  br>Documen           



                                                  huma            



                                                  Infection           



                                                  Site:           



                                                  Urine<br>D           



                                                  uration of           



                                                  Therapy: 7           



                                                  days           

 

     famotidine      2021- No             20mg      20 mg,           Univ

ers



     (PEPCID      5-10 05-10                          Slow IV           ity of



     (PF))      01:00: 00:12                          Push,           [place]



     injection      00   :00                           ONCE, 1           Medical



     20 mg                                         dose, [facility]



                                                  21 at           



                                                  2000, ASAP           

 

     proMETHazin      2021- No             25mg      25 mg, IV           

[facility]



     e         5-10 05-10                          Piggyback,           ity of



     (PHENERGAN)      00:45: 00:11                          ONCE, 1           [facility]

[place]



     25 mg in      00   :00                           dose, [place]           Med[facility]

l



     NaCl 0.9%                                         21 at           [facility]

h



     (NS) 50 mL                                         1945, 50           



     piggyback                                         mL             

 

     FENTanyl PF      2021- No             50ug      50 mcg,           Un

[facility]



     (SUBLIMAZE      5-10 05-10                          Intravenou           it

y of



     (PF))      00:45: 00:12                          s, ONCE, 1           [place]



     injection      00   :00                           dose, Sun           Medic

al



     50 mcg                                         21 at           [facility]



                                                  1945,           



                                                  Routine           

 

     NaCl 0.9%      2021- No             1000mL      at 999           Uni

vers



     (NS) bolus      5-10 05-10                          mL/hr,           ity of



     infusion      00:00: 01:47                          1,000 mL,           [facility]

as



     1,000 mL      00   :00                           IV             Medical



                                                  Infusion,           [facility]



                                                  ONCE, 1           



                                                  dose, [place]           



                                                  5/9/21 at           



                                                  1900, ASAP           

 

     cefdinir      2021- No        [id number] 300mg      Take 1           U

nivers



     300 mg       05-20                          capsule by           ity of



     capsule      00:00: 04:59                          mouth 2           [place]



               00   :00                           (two)           Medical



                                                  times           [facility]



                                                  daily for           



                                                  10 days.           

 

     buPROPion       No             150mg QD   Take 1           [facility]



     (WELLBUTRIN      1-17 -16                          tablet           [facility]

 -



     XL) 150 MG      00:00: 23:59                          (150 mg           Med

ical



     24 hr      00   :00                           total) by           Center



     tablet                                         mouth           



                                                  daily.           

 

     citalopram       No             40mg QD   Take 1           [facility]



     (CELEXA) 40      1-17 -16                          tablet (40           L

ukes -



     MG tablet      00:00: 23:59                          mg total)           Me

dical



               00   :00                           by mouth           Center



                                                  daily.           

 

     oxyCODONE-a            Yes            1{tbl}      Take 1           [facility]



     cetaminophe      1-16                               tablet by           Ni

es -



     n         14:44:                               mouth           Medical



     (PERCOCET)      50                                 every 4           Center



      mg                                         (four)           



     per tablet                                         hours as           



                                                  needed for           



                                                  Pain.           

 

     zinc            Yes            5g   Q.5D Apply 5 g           [facility]



     oxide-yuan      1-16                               topically           Ni

es -



     latum      00:00:                               2 (two)           Medical



     (CRITIC-AID      00                                 times           Center



     ) 20-51 %                                         daily.           



     Pste                                                        



     topical                                                        



     paste                                                        

 

     meropenem            Yes            1g        Inject 1 g           [facility]



     (MERREM)      1-16                               intravenou           [facility]

 -



     MBP 1 gm in      00:00:                               sly every           M

edical



     100 mL NS      00                                 8 (eight)           [facility]

r



                                                  hours.           

 

     insulin            Yes            58U  Q.5D Inject 58           [facility]

t



     glargine      1-16                               Units           [facility]



     (LANTUS)      00:00:                               subcutaneo           Med

ical



     100 unit/mL      00                                 usly 2           Center



     injection                                         (two)           



                                                  times           



                                                  daily Use           



                                                  as             



                                                  directed.           

 

     insulin      2021- No             0U        Inject           [facility]



     lispro      1-16 -15                          0-12 Units           [facility] 

-



     (HUMALOG)      00:00: 23:59                          subcutaneo           M

edical



     100 unit/mL      00   :00                           usly 3           Center



     injection                                         (three)           



                                                  times           



                                                  daily           



                                                  before           



                                                  meals.           

 

     insulin      2021- No             25U       Inject 25           [facility]



     lispro      1-16 01-15                          Units           [facility]



     (HUMALOG)      00:00: 23:59                          subcutaneo           M

edical



     100 unit/mL      00   :00                           usly 3           Center



     injection                                         (three)           



                                                  times           



                                                  daily           



                                                  before           



                                                  meals.           

 

     normal      2018      No                       1,000 mL,           [facility]

a



     saline 0.9%      1                               Rate: 100           l



     IV 1,000 mL      11:04:                               ml/hr,           [facility]

jorge



                                                Infuse           



                                                  over: 10           



                                                  hr, Route:           



                                                  IV, Dosing           



                                                  Weight           



                                                  131.818           



                                                  kg, Total           



                                                  Volume:           



                                                  1,000,           



                                                  Start           



                                                  date:           



                                                  18           



                                                  5:04:00           



                                                  CST,           



                                                  Duration:           



                                                  30 day,           



                                                  Stop date:           



                                                  18           



                                                  5:03:00           



                                                  CST, 2.4,           



                                                  m2             

 

     Magnesium      2018      No                       Notes:           [facility]

a



     Sulfate                                     WASTE: F/P           l



               10:36:                               - Sink; E           [facility]



                                                -              



                                                  Municipal           



                                                  Trash Bin           

 

     Isolyte S      2018      No                       Notes:           [facility]

a



     PH-7.4                                     (Same as:           l



     (Bolus) IV      10:36:                               Isolyte S           He

rmann



                                                PH 7.4)           

 

     Phenergan      2018      No                       12.5 mg,           [facility]

ra[facility]



               08                               0.5 mL,           l



               10:35:                               Route:           [facility]



                                                IVPB, Drug           



                                                  form: INJ,           



                                                  ONCE,           



                                                  Dosing           



                                                  Weight           



                                                  131.818,           



                                                  kg,            



                                                  Priority:           



                                                  STAT,           



                                                  Start           



                                                  date:           



                                                  18           



                                                  4:35:00           



                                                  CST, Stop           



                                                  date:           



                                                  18           



                                                  4:35:00           



                                                  CST            

 

     Citalopram Citalopram       Yes  Na [place]                1 tablet     

      [facility]



     Hydrobromid Hydrobromid 7-16                                              L

[facility] -



     e    e    00:00:                                              [facility]



                                                                 [facility]

 

     Seroquel Seroquel       Yes  Na [place]                1 tablet         

  [facility]



               7-16                                              [facility]



               00:00:                                              [facility]



               [facility]



                                                                 [facility]

 

     Docusate            Yes                      100 mg = 1           Mem

oria



     Sodium 100      5-08                               cap, PO,           l



     MG Oral      14:56:                               BID, 0           [facility]



     Capsule      00                                 Refill(s)           

 

     Zosyn            Yes                      0              [facility]



               5-08                               Refill(s)           l



               14:56:                                              [facility]



               00                                                

 

     celecoxib            Yes                      200 mg = 1           Me

moria



     200 mg oral      5-08                               cap, PO,           l



     capsule      14:56:                               BID, 0           [facility]



               00                                 Refill(s)           

 

     ascorbic            Yes                      500 mg = 1           Mem

oria



     acid      5-08                               tab, PO,           l



               14:56:                               BID, 0           [facility]



               00                                 Refill(s)           

 

     acetaminoph            Yes                      1,000 mg =           

[facility]



     en 500 mg      5-08                               2 tab, PO,           l



     oral tablet      14:56:                               Q6Hnow, 0           H

ermann



               00                                 Refill(s)           

 

     Oxycodone            Yes                      5 mg = 1           [facility]

rajeev



     Hydrochlori      5-08                               tab, PO,           l



     de 5 MG      14:56:                               Q4H, PRN           [facility]

n



     Oral Tablet      00                                 Pain Score           



                                                  4-6, 0           



                                                  Refill(s)           

 

     zinc            Yes                      220 mg = 1           [facility]



     sulfate 220      5-08                               cap, PO,           l



     mg oral      14:56:                               Daily, 0           [facility]

n



     capsule      00                                 Refill(s)           

 

     multivitami            Yes                      1 tab, PO,           

[facility]



     n         5-08                               Daily, 0           l



               14:56:                               Refill(s)           [facility]



                                                               

 

     methocarbam            Yes                      1,000 mg =           

[facility]



     ol 500 mg      5-08                               2 tab, PO,           l



     oral tablet      14:56:                               Q8H, 0           [facility]

jorge



               00                                 Refill(s)           

 

     LORazepam            Yes                      0.5 mg = 1           [facility]

[facility]



     0.5 mg oral      5-08                               tab, PO,           l



     tablet      14:56:                               Q8H, PRN           [facility]



               00                                 Anxiety, 0           



                                                  Refill(s)           

 

     Lidocaine            Yes                      3 patch,           [facility]

rajeev



     Hydrochlori      5-08                               TOP,           l



     de 0.05      14:56:                               Daily,           [facility]



     MG/MG      00                                 Remove           



     Transdermal                                         after 12           



     Patch                                         hours, 0           



     [Lidoderm]                                         Refill(s)           

 

     Robaxin            No                       Notes:           [facility]



               5-06                               (Same           l



               21:00:                               as:Robaxin           [facility]



                                                )              

 

     Oxycodone            No                       Notes:           [facility]

a



     Hydrochlori      5-06                               (Same as:           l



     de 5 MG      18:06:                               Roxicodone           [facility]

jorge



     Oral Tablet      00                                 )              

 

     Ativan            No                       Notes:           [facility]



               5-06                               (Same as:           l



               15:18:                               Ativan)           [facility]



               00                                                

 

     Trazodone            No                       Notes:           [facility]

a



     Hydrochlori      5-06                               (Same As:           l



     de 50 MG      02:00:                               Desyrel)           [facility]

nn



     Oral Tablet      00                                                

 

     remove            No                       Notes:           [facility]



     patch      -06                               Remove           l



               02:00:                               patch 12           [facility]



               00                                 hours           



                                                  after           



                                                  applicatio           



                                                  n each           



                                                  day.           

 

     Oxycodone            No                       Notes:           [facility]

a



     Hydrochlori      5-05                               (Same as:           l



     de 5 MG      22:01:                               Roxicodone           [facility]

jorge



     Oral Tablet      00                                 )              

 

     Celebrex            No                       Notes:           [facility]



               5-05                               NSAID.           l



               22:00:                               Please           [facility]



               00                                 check           



                                                  indication           



                                                  . Not for           



                                                  seizure.           



                                                  (Same As:           



                                                  CeleBREX)           

 

     Vancomycin            No                        2001 mg:           [facility]

[facility]



               5-05                               infuse           l



               21:00:                               over 2.5           [facility]



               00                                 hours           

 

     Lidocaine            No                       Notes:           [facility]

a



     Hydrochlori      5-05                               Apply only           l



     de 0.05      14:00:                               once for           [facility]

n



     MG/MG      00                                 up to 12           



     Transdermal                                         hours in a           



     Patch                                         24-hour           



     [Lidoderm]                                         period (12           



                                                  hours on           



                                                  and 12           



                                                  hours           



                                                  off).           



                                                  (Same as:           



                                                  Lidoderm)           



                                                  "Remove           



                                                  old patch           



                                                  before           



                                                  applicatio           



                                                  n of new           



                                                  patch"           

 

     Phenergan            No                       Notes: Do           [facility]

[facility]



               5-04                               not give           l



               22:40:                               IV push.           [facility]                                 (Same as:           



                                                  Phenergan)           

 

     Dilaudid            No                       Notes:           [facility]



               5-04                               Same as           l



               22:40:                               Dilaudid           [facility]



               00                                                

 

     Tramadol            No                       Notes: Not           [facility]

[facility]



               5-04                               to exceed           l



               22:00:                               400mg/day.           [facility]



               00                                 (Same As:           



                                                  Ultram)           

 

     gabapentin            No                       Notes:           [facility]

[facility]



               -04                               (Same as:           l



               22:00:                               Neurontin)           [facility]



               00                                                

 

     Acetaminoph            No                       Notes: Max           

[facility]



     en        5-04                               acetaminop           l



               22:00:                               hen 4000           [facility]



               00                                 mg/day (4           



                                                  gm/day).           



                                                  (Same as:           



                                                  Tylenol           



                                                  Extra           



                                                  Strength)           

 

     Robaxin            No                       Notes:           [facility]



               5-04                               (Same           l



               22:00:                               as:Robaxin           [facility]



               00                                 )              

 

     Oxycodone            No                       Notes:           [facility]

a



     Hydrochlori      5-04                               (Same as:           l



     de 5 MG      21:33:                               Roxicodone           [facility]

jorge



     Oral Tablet      00                                 )              

 

     Beneprotein            No                       Notes:           [facility]

rajeev



     7 gm pkt      5-04                               (Same as:           l



               21:30:                               Beneprotei           [facility]



               00                                 n)             

 

     Acetaminoph            No                       1 tab, PO,           

[facility]



     en 325 MG /      5-04                               TID, 0           l



     Oxycodone      17:12:                               Refill(s)           [facility]

mateusz



     Hydrochlori      00                                                



     de 10 MG                                                        



     Oral Tablet                                                        



     [Percocet                                                        



     10/325]                                                        

 

     Dilaudid            No                       0.5 mg,           [facility]

a



               5-04                               0.25 mL,           l



               16:01:                               Route:                                            IVP, Drug           



                                                  form: INJ,           



                                                  ONCE,           



                                                  Start           



                                                  date:           



                                                  18           



                                                  11:01:00           



                                                  CDT, Stop           



                                                  date:           



                                                  18           



                                                  11:01:00           



                                                  CDT            

 

     Phenergan            No                       Notes:           [facility]

a



               -                               (Same as:           l



               15:43:                               Phenergan)                                                           

 

     Dilaudid            No                       2 mg,           [facility]



               5-04                               Route:           l



               15:43:                               IVP, ONCE,                                            Dosing           



                                                  Weight           



                                                  127.027,           



                                                  kg,            



                                                  Priority:           



                                                  STAT,           



                                                  Start           



                                                  date:           



                                                  18           



                                                  10:43:00           



                                                  CDT, Stop           



                                                  date:           



                                                  18           



                                                  10:43:00           



                                                  CDT            

 

     Docusate            No                       Notes:           [facility]



               5-                               (Same as:           l



               14:00:                               Colace)                                            (Do Not           



                                                  Crush)           

 

     Zinc            No                       Notes:           [facility]



     Sulfate                                     (Zinc           l



               14:00:                               sulfate                                            capsule) -           



                                                  220 mg           



                                                  Zinc           



                                                  sulfate =           



                                                  50 mg           



                                                  elemental           



                                                  zinc  Same           



                                                  as Zinc           



                                                  Sulfate           

 

     ascorbic            No                       Notes:           [facility]



     acid      -                               (Same as:           l



               14:00:                               Vitamin C)                                                           

 

     multivitami            No                       Notes:           [facility]

[facility]



     n         -04                               (Same           l



               14:00:                               as:Thera)                                            WASTE: F/P           



                                                  - Black; E           



                                                  -              



                                                  Municipal           



                                                  Trash Bin           



                                                  Take with           



                                                  food.           

 

     Naproxen            No                       Notes:           [facility]



               5-04                               (Same as:           l



               07:00:                               Naprosyn)                                            Take with           



                                                  food.           

 

     Zosyn            No                       Notes:           [facility]



               5-04                               (Same as:           l



               07:00:                               Zosyn)                                            Dosing           



                                                  based on           



                                                  Piperacill           



                                                  in             



                                                  component           



                                                   ***           



                                                  MEDICATION           



                                                  WASTE ***           



                                                  Product           



                                                  Size:           



                                                  3375 mg           



                                                  Product           



                                                  Wasted:           



                                                  ___ mg           

 

     Vancomycin            No                        2001 mg:           [facility]

[facility]



               5-04                               infuse           l



               07:00:                               over 2.5           [facility]



               00                                 hours  For           



                                                  adult           



                                                  patients           



                                                  only:           



                                                  Round to           



                                                  nearest           



                                                  250 mg per           



                                                  Medical           



                                                  Staff           



                                                  approval           



                                                  ***            



                                                  MEDICATION           



                                                  WASTE ***           



                                                  Product           



                                                  Size:           



                                                  1000 mg           



                                                  Product           



                                                  Wasted:           



                                                  ___ mg           

 

     Enoxaparin            No                       Notes:           [facility]

[facility]



               -04                               (Same as:           l



               07:00:                               Lovenox)           [facility]



               00                                                

 

     Sodium            No                       1,000 mL,           [facility]

a



     Chloride                                     Rate: 125           l



     0.9% IV      06:46:                               ml/hr,           [facility]



     1,000 mL      00                                 Infuse           



                                                  over: 8           



                                                  hr, Route:           



                                                  IV, Dosing           



                                                  Weight           



                                                  127.27 kg,           



                                                  Total           



                                                  Volume:           



                                                  1,000,           



                                                  Start           



                                                  date:           



                                                  18           



                                                  1:46:00           



                                                  CDT,           



                                                  Duration:           



                                                  30 day,           



                                                  Stop date:           



                                                  18           



                                                  1:45:00           



                                                  CDT, 2.44,           



                                                  m2             

 

     Saline            No                       Notes:           [facility]



     Flush 0.9%                                     (Same as:           l



               06:46:                               BD             [facility]



                                                Posiflush)           

 

     Acetaminoph            No                       Notes: Do           [facility]

[facility]



     en        -04                               not exceed           l



               06:46:                               4 gm/day.           [facility]



                                                (Same as:           



                                                  Tylenol)           

 

     Acetaminoph            No                       Notes:           [facility]

[facility]



     en 325 MG /      -                               (Same as:           l



     Hydrocodone      06:46:                               Norco           [facility]

nn



     Bitartrate      00                                 325/5)  Do           



     5 MG Oral                                         not exceed           



     Tablet                                         4gm/day of           



                                                  acetaminop           



                                                  hen.           

 

     Reglan            No                       Notes:           [facility]



               5-04                               (Same as:           l



               04:27:                               Reglan)           [facility]



               00                                                

 

     Benadryl            No                       Notes:           [facility]



               5-04                               (Same as:           l



               04:27:                               Benadryl)           [facility]



                                                               

 

     Magnesium            No                       Notes:           [facility]

a



     Sulfate                                     WASTE: F/P           l



               04:26:                               - Sink; E           [facility]



                                                -              



                                                  Municipal           



                                                  Trash Bin           

 

     Sodium            No                       1,000 mL,           [facility]

a



     Chloride      -04                               1000           l



     0.9%      04:26:                               ml/hr,           [facility]



     (Bolus) IV      00                                 Infuse           



                                                  Over: 1           



                                                  hr, Route:           



                                                  IV, 1,000,           



                                                  Drug form:           



                                                  INJ, ONCE,           



                                                  Priority:           



                                                  STAT,           



                                                  Dosing           



                                                  Weight           



                                                  127.273           



                                                  kg, Start           



                                                  date:           



                                                  18           



                                                  23:26:00           



                                                  CDT, Stop           



                                                  date:           



                                                  18           



                                                  23:26:00           



                                                  CDT            

 

     Zosyn            No                       4.5 gm,           [facility]



                                              Route:           l



               04:10:                               IVPB,           [facility]



                                                ONCE,           



                                                  Dosing           



                                                  Weight           



                                                  127.273,           



                                                  kg,            



                                                  Priority:           



                                                  STAT,           



                                                  Start           



                                                  date:           



                                                  18           



                                                  23:10:00           



                                                  CDT, Stop           



                                                  date:           



                                                  18           



                                                  23:10:00           



                                                  CDT, ABX           



                                                  Indication           



                                                  :              



                                                  Bacteremia           

 

     Vancomycin            No                         mg:           Me

[facility]



                                              infuse           l



               04:10:                               over 2.5           [facility]



               00                                 hours  For           



                                                  adult           



                                                  patients           



                                                  only:           



                                                  Round to           



                                                  nearest           



                                                  250 mg per           



                                                  Medical           



                                                  Staff           



                                                  approval           



                                                  ***            



                                                  MEDICATION           



                                                  WASTE ***           



                                                  Product           



                                                  Size:           



                                                  1000 mg           



                                                  Product           



                                                  Wasted:           



                                                  ___ mg           

 

     normal            No                       1,000 mL,           [facility]

a



     saline 0.9%                                     Rate: 75           l



     IV 1,000 mL      03:39:                               ml/hr,                                            Infuse           



                                                  over: 13.3           



                                                  hr, Route:           



                                                  IV, Dosing           



                                                  Weight           



                                                  127.273           



                                                  kg, Total           



                                                  Volume:           



                                                  1,000,           



                                                  Start           



                                                  date:           



                                                  18           



                                                  22:39:00           



                                                  CDT,           



                                                  Duration:           



                                                  30 day,           



                                                  Stop date:           



                                                  18           



                                                  22:38:00           



                                                  CDT, 2.36,           



                                                  m2             

 

     Acetaminoph            No                       Notes: Max           

[facility]



     en                                       acetaminop           l



               02:56:                               hen 4000           [facility]                                 mg/day (4           



                                                  gm/day).           



                                                  (Same as:           



                                                  Tylenol           



                                                  Extra           



                                                  Strength)           

 

     Rocephin            No                       Notes:           [facility]



                                              (Same As:           l



               02:21:                               Rocephin).           [facility]                                   ***           



                                                  MEDICATION           



                                                  WASTE ***           



                                                  Product           



                                                  Size:           



                                                  1000 mg           



                                                  Product           



                                                  Wasted:           



                                                  _0__ mg           

 

     Morphine            No                       4 mg,           [facility]



                                              Route:           l



               00:05:                               IVP, ONCE,                                            Dosing           



                                                  Weight           



                                                  127.273,           



                                                  kg,            



                                                  Priority:           



                                                  STAT,           



                                                  Start           



                                                  date:           



                                                  18           



                                                  19:05:00           



                                                  CDT, Stop           



                                                  date:           



                                                  18           



                                                  19:05:00           



                                                  CDT            

 

     Benadryl            No                       Notes:           [facility]



                                              (Same as:           l



               23:14:                               Benadryl)           [facility]



                                                               

 

     Benadryl      2018-0      No                       Notes:           [facility]



               5-03                               (Same as:           l



               22:56:                               Benadryl)                                                           

 

     Morphine      2018-0      No                       4 mg, 1           [facility]

a



               5-03                               mL, Route:           l



               22:56:                               IVP, Drug                                            form:           



                                                  SOLN,           



                                                  ONCE,           



                                                  Dosing           



                                                  Weight           



                                                  127.273,           



                                                  kg,            



                                                  Priority:           



                                                  STAT,           



                                                  Start           



                                                  date:           



                                                  18           



                                                  17:56:00           



                                                  CDT, Stop           



                                                  date:           



                                                  18           



                                                  17:56:00           



                                                  CDT            

 

     OXYCODONE      2017      Yes                      Take  by           [facility]

ers



     HCL/ACETAMI      2-20                               mouth.           [facility] of



     NOPHEN      10:03:                                              [place]



     (PERCOCET      17                                                Medical



     ORAL)                                                        [facility]

 

     OXYCODONE      2017      Yes                      Take  by           [facility]

ers



     HCL/ACETAMI      2-20                               mouth.           [facility] of



     NOPHEN      04:03:                                              [place]



     (PERCOCET      17                                                Medical



     ORAL)                                                        [facility]

 

     OXYCODONE      2017      Yes                      Take  by           [facility]

ers



     HCL/ACETAMI      2-20                               mouth.           [facility] of



     NOPHEN      04:03:                                              [place]



     (PERCOCET      17                                                Medical



     ORAL)                                                        [facility]

 

     OXYCODONE      2017      Yes                      Take  by           [facility]

ers



     HCL/ACETAMI      2-20                               mouth.           [facility] of



     NOPHEN      04:03:                                              [place]



     (PERCOCET      17                                                Medical



     ORAL)                                                        [facility]

 

     OXYCODONE      2017      Yes                      Take  by           [facility]

ers



     HCL/ACETAMI      2-20                               mouth.           [facility] of



     NOPHEN      04:03:                                              [place]



     (PERCOCET      17                                                Medical



     ORAL)                                                        [facility]

 

     dicyclomine      2017      Yes            20mg      Take 1           [facility]

ers



     (BENTYL) 20      2-20                               tablet by           [facility]

 of



     mg tablet      00:00:                               mouth 4           [place]



               00                                 (four)           Medical



                                                  times           [facility]



                                                  daily.           

 

     proMETHazin      2017      Yes            25mg      Take 1           [facility]

ers



     e 25 mg      2-20                               tablet by           [facility] of



     tablet      00:00:                               mouth           [place]



               00                                 every 6           Medical



                                                  (six)           [facility]



                                                  hours as           



                                                  needed for           



                                                  Nausea and           



                                                  Vomiting           



                                                  (N/V).           

 

     proMETHazin      2017      Yes            25mg      Take 1           [facility]

ers



     e 25 mg      2-20                               tablet by           [facility] of



     tablet      00:00:                               mouth           [place]



               00                                 every 6           Medical



                                                  (six)           [facility]



                                                  hours as           



                                                  needed for           



                                                  Nausea and           



                                                  Vomiting           



                                                  (N/V).           

 

     proMETHazin      2017      Yes            25mg      Take 1           [facility]

ers



     e 25 mg      2-20                               tablet by           [facility] of



     tablet      00:00:                               mouth           [place]



               00                                 every 6           Medical



                                                  (six)           [facility]



                                                  hours as           



                                                  needed for           



                                                  Nausea and           



                                                  Vomiting           



                                                  (N/V).           

 

     dicyclomine      -      Yes            20mg      Take 1           [facility]

ers



     (BENTYL) 20      2-20                               tablet by           [facility]

 of



     mg tablet      00:00:                               mouth 4           [place]



               00                                 (four)           Medical



                                                  times           [facility]



                                                  daily.           

 

     proMETHazin      2017      Yes            25mg      Take 1           [facility]

ers



     e 25 mg      2-20                               tablet by           ity of



     tablet      00:00:                               mouth           [place]



               00                                 every 6           Medical



                                                  (six)           [facility]



                                                  hours as           



                                                  needed for           



                                                  Nausea and           



                                                  Vomiting           



                                                  (N/V).           

 

     dicyclomine      -      Yes            20mg      Take 1           [facility]

ers



     (BENTYL) 20      2-20                               tablet by           [facility]

 of



     mg tablet      00:00:                               mouth 4           [place]



               00                                 (four)           Medical



                                                  times           [facility]



                                                  daily.           

 

     proMETHazin      -      Yes            25mg      Take 1           [facility]

ers



     e 25 mg      2-20                               tablet by           [facility] of



     tablet      00:00:                               mouth           [place]



               00                                 every 6           Medical



                                                  (six)           [facility]



                                                  hours as           



                                                  needed for           



                                                  Nausea and           



                                                  Vomiting           



                                                  (N/V).           

 

     proMETHazin      2017- No             25mg      Take 1           [facility]

[facility]



     e 25 mg      2-20 01-25                          tablet by           [facility] of



     tablet      00:00: 00:00                          mouth           [place]



               00   :00                           every 6           Medical



                                                  (six)           [facility]



                                                  hours as           



                                                  needed for           



                                                  Nausea and           



                                                  Vomiting           



                                                  (N/V).           

 

     dicyclomine      2017- No             20mg      Take 1           [facility]

[facility]



     (BENTYL) 20      2-20 01-15                          tablet by           it

y of



     mg tablet      00:00: 00:00                          mouth 4           [place]

[facility]



               00   :00                           (four)           [facility]



                                                  times           [facility]



                                                  daily.           

 

     proMETHazin      -0      Yes            25mg      Take 1           [facility]

ers



     e 25 mg      1-04                               tablet by           [facility]



     tablet      00:00:                               mouth           [place]



               00                                 every 6           Medical



                                                  (six)           [facility]



                                                  hours as           



                                                  needed for           



                                                  Nausea and           



                                                  Vomiting           



                                                  (N/V) for           



                                                  up to 12           



                                                  doses.           

 

     proMETHazin      2017-0      Yes            25mg      Take 1           [facility]

ers



     e 25 mg      1-04                               tablet by           [facility] of



     tablet      00:00:                               mouth           [place]



               00                                 every 6           Medical



                                                  (six)           [facility]



                                                  hours as           



                                                  needed for           



                                                  Nausea and           



                                                  Vomiting           



                                                  (N/V) for           



                                                  up to 12           



                                                  doses.           

 

     proMETHazin      2017-0      Yes            25mg      Take 1           [facility]

ers



     e 25 mg      1-04                               tablet by           [facility] of



     tablet      00:00:                               mouth           [place]



               00                                 every 6           Medical



                                                  (six)           [facility]



                                                  hours as           



                                                  needed for           



                                                  Nausea and           



                                                  Vomiting           



                                                  (N/V) for           



                                                  up to 12           



                                                  doses.           

 

     proMETHazin      2017-0      Yes            25mg      Take 1           [facility]

ers



     e 25 mg      1-04                               tablet by           [facility] of



     tablet      00:00:                               mouth           [place]



               00                                 every 6           Medical



                                                  (six)           [facility]



                                                  hours as           



                                                  needed for           



                                                  Nausea and           



                                                  Vomiting           



                                                  (N/V) for           



                                                  up to 12           



                                                  doses.           

 

     proMETHazin      2017-0      Yes            25mg      Take 1           [facility]

ers



     e 25 mg      1-04                               tablet by           [facility] of



     tablet      00:00:                               mouth           [place]



               00                                 every 6           Medical



                                                  (six)           [facility]



                                                  hours as           



                                                  needed for           



                                                  Nausea and           



                                                  Vomiting           



                                                  (N/V) for           



                                                  up to 12           



                                                  doses.           

 

     proMETHazin      -0      Yes            25mg      Take 1           [facility]

ers



     e 25 mg      1-04                               tablet by           ity of



     tablet      00:00:                               mouth           [place]



               00                                 every 6           Medical



                                                  (six)           [facility]



                                                  hours as           



                                                  needed for           



                                                  Nausea and           



                                                  Vomiting           



                                                  (N/V) for           



                                                  up to 12           



                                                  doses.           

 

     proMETHazin      2017-0      Yes            25mg      Take 1           [facility]

ers



     e 25 mg      1-04                               tablet by           [facility] of



     tablet      00:00:                               mouth           [place]



               00                                 every 6           [facility]



                                                  (six)           [facility]



                                                  hours as           



                                                  needed for           



                                                  Nausea and           



                                                  Vomiting           



                                                  (N/V) for           



                                                  up to 12           



                                                  doses.           

 

     proMETHazin      -0      Yes            25mg      Take 1           Univ

ers



     e 25 mg      1-04                               tablet by           ity of



     tablet      00:00:                               mouth           [place]



               00                                 every 6           Medical



                                                  (six)           Branch



                                                  hours as           



                                                  needed for           



                                                  Nausea and           



                                                  Vomiting           



                                                  (N/V) for           



                                                  up to 12           



                                                  doses.           

 

     Tylenol Tylenol           Yes  [facility] [place]                1 tablet           [facility]

[facility]



     with with                                    as needed           [facility]



     Codeine #4 Codeine #4                                                   [facility]

oria



                                                                 l



                                                                 [facility]



                                                                 ent



                                                                 Clinics

 

     Macrobid Macrobid           Yes  [facility] [place]                1 capsule          

 [facility]



                                                  with food           [facility]



                                                                 [facility]



                                                                 l



                                                                 [facility]



                                                                 ent



                                                                 Clinics

 

     Pantoprazol Pantoprazol           Yes  [facility] [place]                1 tablet     

      [facility]



     e Sodium e Sodium                                                   [facility]



                                                                 [facility]



                                                                 l



                                                                 [facility]



                                                                 ent



                                                                 Clinics

 

     Collagenase Collagenase           Yes  [facility] [place]                1            

  [facility]



                                                  applicatio           [facility]



                                                  n to           [facility]



                                                  affected           l



                                                  area           [facility]



                                                                 ent



                                                                 Clinics







Vital Signs







             Vital Name   Observation Time Observation Value Comments     Source

 

             Systolic blood 2022 03:18:00 113 mm[Hg]                Univer

sity [facility]

 

             Diastolic blood 2022 03:18:00 85 mm[Hg]                 [facility]

 

             Heart rate   2022 03:18:00 96 /min                   [facility]

 

             Respiratory rate 2022 03:18:00 18 /min                   [facility]

 

             Oxygen saturation in 2022 03:18:00 93 /min                   

[facility]



             Arterial blood by                                        [place] [facility]

sarah



             Pulse oximetry                                        Branch

 

             Body temperature 2022 01:01:16 36.89 Tiffanie                 Univ

ersity of



                                                                 [place] [facility]



                                                                 Branch

 

             Body weight  2022 23:13:00 127.461 kg                Univers[facility]

ty of



                                                                 [place] [facility]



                                                                 Branch

 

             BMI          2022 23:13:00 56.76 kg/m2               Univers[facility]

ty of



                                                                 [place] [facility]



                                                                 Branch

 

             Systolic blood 2022 21:53:00 142 mm[Hg]                Univer

sity of



             pressure                                            [place] [facility]



                                                                 Branch

 

             Diastolic blood 2022 21:53:00 88 mm[Hg]                 Unive

rsity of



             pressure                                            [place] [facility]



                                                                 Branch

 

             Heart rate   2022 21:53:00 96 /min                   [facility]

ty of



                                                                 [place] [facility]



                                                                 Branch

 

             Body temperature 2022 21:53:00 36.06 Tiffanie                 Univ

ersity of



                                                                 [place] [facility]



                                                                 Branch

 

             Respiratory rate 2022 21:53:00 18 /min                   Univ

ersity of



                                                                 [place] [facility]



                                                                 Branch

 

             Oxygen saturation in 2022 21:53:00 96 /min                   

[facility]



             Arterial blood by                                        [place] [facility]

sarah



             Pulse oximetry                                        Branch

 

             Body weight  2022 09:48:00 138.801 kg                Univers[facility]

ty of



                                                                 [place] [facility]



                                                                 Branch

 

             BMI          2022 09:48:00 61.80 kg/m2               Univers[facility]

ty of



                                                                 [place] [facility]



                                                                 Branch

 

             Body height  2022 10:27:00 149.9 cm                  [facility]

ty of



                                                                 [place] [facility]



                                                                 Branch

 

             Systolic blood 2022 03:50:00 142 mm[Hg]                Univer

sity of



             pressure                                            [place] [facility]



                                                                 Branch

 

             Diastolic blood 2022 03:50:00 95 mm[Hg]                 Unive

rsity of



             pressure                                            [place] [facility]



                                                                 Branch

 

             Heart rate   2022 03:50:00 93 /min                   [facility]

ty of



                                                                 [place] [facility]



                                                                 Branch

 

             Respiratory rate 2022 03:50:00 17 /min                   Univ

ersity of



                                                                 [place] [facility]



                                                                 Branch

 

             Oxygen saturation in 2022 03:50:00 98 /min                   

[facility]



             Arterial blood by                                        [place] [facility]

sarah



             Pulse oximetry                                        Branch

 

             Body temperature 2022 20:39:00 36.5 Tiffanie                  Univ

ersity of



                                                                 [place] [facility]



                                                                 Branch

 

             Body weight  2022 20:39:00 136.079 kg                Univers[facility]

ty of



                                                                 [place] [facility]



                                                                 Branch

 

             BMI          2022 20:39:00 60.59 kg/m2               Univers[facility]

ty of



                                                                 [place] [facility]



                                                                 Branch

 

             Systolic blood 2022 01:46:00 134 mm[Hg]                Univer

sity of



             pressure                                            [place] [facility]



                                                                 Branch

 

             Diastolic blood 2022 01:46:00 100 mm[Hg]                Unive

rsity of



             pressure                                            [place] [facility]



                                                                 Branch

 

             Heart rate   2022 01:46:00 102 /min                  Univers[facility]

ty of



                                                                 [place] [facility]



                                                                 Branch

 

             Respiratory rate 2022 01:46:00 22 /min                   Univ

ersity of



                                                                 [place] [facility]



                                                                 Branch

 

             Oxygen saturation in 2022 01:46:00 96 /min                   

[facility]



             Arterial blood by                                        [place] [facility]

sarah



             Pulse oximetry                                        Branch

 

             Body temperature 2022-01-15 20:52:00 36.67 Tiffanie                 Univ

ersity of



                                                                 [place] [facility]



                                                                 Branch

 

             Body weight  2022-01-15 20:52:00 136.079 kg                Universi

ty of



                                                                 [place] [facility]



                                                                 Branch

 

             BMI          2022-01-15 20:52:00 60.59 kg/m2               Universi

ty of



                                                                 [place] [facility]



                                                                 Branch

 

             Systolic blood 2021 23:30:00 175 mm[Hg]                Univer

sity of



             pressure                                            [place] [facility]



                                                                 Branch

 

             Diastolic blood 2021 23:30:00 107 mm[Hg]                Unive

rsity of



             pressure                                            [place] [facility]



                                                                 Branch

 

             Heart rate   2021 23:30:00 81 /min                   Univers[facility]

ty of



                                                                 [place] [facility]



                                                                 Branch

 

             Respiratory rate 2021 23:30:00 21 /min                   Univ

ersity of



                                                                 [place] [facility]



                                                                 Branch

 

             Oxygen saturation in 2021 23:30:00 97 /min                   

[facility]



             Arterial blood by                                        [place] [facility]

sarah



             Pulse oximetry                                        Branch

 

             Body weight  2021 21:55:00 136.079 kg                Universi

ty of



                                                                 [place] [facility]



                                                                 Branch

 

             BMI          2021 21:55:00 60.59 kg/m2               Universi

ty of



                                                                 [place] [facility]



                                                                 Branch

 

             Body temperature 2021 21:55:00 37.44 Tiffanie                 Univ

ersity of



                                                                 [place] [facility]



                                                                 Branch

 

             Systolic blood 2021-05-10 01:30:00 163 mm[Hg]                Univer

sity of



             pressure                                            [place] [facility]



                                                                 Branch

 

             Diastolic blood 2021-05-10 01:30:00 106 mm[Hg]                Unive

rsity of



             pressure                                            [place] [facility]



                                                                 Branch

 

             Heart rate   2021-05-10 01:30:00 97 /min                   Univers[facility]

ty of



                                                                 [place] [facility]



                                                                 Branch

 

             Respiratory rate 2021-05-10 01:30:00 21 /min                   Univ

ersity of



                                                                 [place] [facility]



                                                                 Branch

 

             Oxygen saturation in 2021-05-10 01:30:00 97 /min                   

[facility]



             Arterial blood by                                        [place] [facility]

sarah



             Pulse oximetry                                        Branch

 

             Body temperature 2021 23:27:00 36.83 Tiffanie                 Univ

ersity of



                                                                 [place] [facility]



                                                                 Branch

 

             Body height  2021 23:27:00 149.9 cm                  Univers[facility]

ty of



                                                                 [place] [facility]



                                                                 Branch

 

             Body weight  2021 23:27:00 136.079 kg                [facility]

ty of



                                                                 [place] [facility]



                                                                 Branch

 

             BMI          2021 23:27:00 60.59 kg/m2               Univers[facility]

ty of



                                                                 [place] [facility]



                                                                 Branch

 

             Systolic blood 2021-05-10 01:30:00 163 mm[Hg]                Univer

sity of



             pressure                                            [place] [facility]



                                                                 Branch

 

             Diastolic blood 2021-05-10 01:30:00 106 mm[Hg]                Unive

rsity of



             pressure                                            [place] [facility]



                                                                 [facility]

 

             Heart rate   2021-05-10 01:30:00 97 /min                   Univers[facility]

ty of



                                                                 [place] [facility]



                                                                 Branch

 

             Respiratory rate 2021-05-10 01:30:00 21 /min                   Univ

ersity of



                                                                 [place] [facility]



                                                                 Branch

 

             Oxygen saturation in 2021-05-10 01:30:00 97 /min                   

[facility]



             Arterial blood by                                        [place] [facility]

sarah



             Pulse oximetry                                        Branch

 

             Body temperature 2021 23:27:00 36.83 Tiffanie                 Univ

ersity of



                                                                 [place] [facility]



                                                                 [facility]

 

             Body height  2021 23:27:00 149.9 cm                  Univers[facility]

ty of



                                                                 [place] [facility]



                                                                 Branch

 

             Body weight  2021 23:27:00 136.079 kg                [facility]

ty of



                                                                 [place] [facility]



                                                                 Branch

 

             BMI          2021 23:27:00 60.59 kg/m2               Univers[facility]

ty of



                                                                 [place] [facility]



                                                                 Branch

 

             Respitory Rate 2018 17:30:00                           [facility]

al [facility]

 

             Systolic (mm Hg) 2018 17:30:00                           [facility]ace [facility]quin [facility]

 

             Diastolic (mm Hg) 2018 17:30:00                           [facility]

[facility]

 

             Temperature Oral (F) 2018 17:30:00 98.4 F                    

[facility]

 

             Temperature Oral (F) 2018 16:23:00 98.6 F                    

[facility]

 

             Systolic (mm Hg) 2018 16:23:00                           Chace

rial [facility]

 

             Diastolic (mm Hg) 2018 16:23:00                           [facility]

[facility]

 

             Respitory Rate 2018 14:00:00                           Mem[facility]

al [facility]

 

             Systolic (mm Hg) 2018 14:00:00                           [facility]

[facility]

 

             Diastolic (mm Hg) 2018 14:00:00                           [facility]

[facility]

 

             Respitory Rate 2018 13:30:00                           [facility]

[facility]

 

             BMI Calculated 2018 10:16:00                           [facility]

[facility]

 

             Height       2018 10:16:00 149.86 cm                 [facility]

 [facility]

 

             Weight       2018 10:16:00                           [facility]

 [facility]

 

             Heart Rate   2018 10:16:00                           [facility]

 [facility]

 

             Temperature Oral (F) 2018 10:16:00 97.9 F                    

[facility]

 

             Temperature Oral (F) 2018 13:40:00 97.2 F                    

[facility]

 

             Systolic (mm Hg) 2018 13:40:00                           [facility]

[facility]

 

             Diastolic (mm Hg) 2018 13:40:00                           [facility]

[facility]

 

             Heart Rate   2018 13:40:00                           [facility]

 [facility]

 

             Respitory Rate 2018 13:40:00                           [facility]

[facility]

 

             Heart Rate   2018 05:24:00                           [facility]

 [facility]

 

             Systolic (mm Hg) 2018 05:24:00                           Chace

rial [facility]

 

             Diastolic (mm Hg) 2018 05:24:00                           [facility]

[facility]

 

             Respitory Rate 2018 05:24:00                           Mem[facility]

al [facility]

 

             Temperature Oral (F) 2018 05:24:00 98.1 F                    

[facility]

 

             Respitory Rate 2018 01:15:00                           [facility]

[facility]

 

             Systolic (mm Hg) 2018 01:15:00                           [facility]

[facility]

 

             Diastolic (mm Hg) 2018 01:15:00                           Mem

[facility]

 

             Heart Rate   2018 01:15:00                           [facility]

 [facility]

 

             Temperature Oral (F) 2018 01:15:00 98.1 F                    

[facility]

 

             Weight       2018 14:00:00                           [facility]

 [facility]

 

             [facility]       2018 14:00:00                           [facility]

 

             Weight       2018 14:00:00                           [facility]

 [facility]

 

             BMI Calculated 2018 05:43:00                           [facility]

 

             Height       2018 05:43:00 162.56 cm                 [facility]

 

             Height       2018 22:41:00 149.86 cm                 [facility]

 [facility]

 

             BMI Calculated 2018 22:41:00                           Siri morales Jose







Procedures







                Procedure       Date / Time     Performing Clinician Source



                                Performed                       

 

                CT HEAD WO CONTRAST 2022 00:15:29 ALLISON Mckeon [facility]

 

                URINALYSIS      2022 23:53:00 ALLISON Mckeon Elena [facility]

 

                COMP. METABOLIC PANEL 2022 23:52:00 ALLISON Mckeon [facility]

[facility] [place]



                ([id number])                                         [facility]

 

                CBC WITH DIFF   2022 23:52:00 ALLISON Mckeon Elena [facility]

 

                XR CHEST 1 VW   2022 03:03:32 Mirta Tate  [facility]

 

                COMP. METABOLIC PANEL 2022 11:57:00 [facility]



                ([id number])                                         [facility]

 

                CBC WITH DIFF   2022 11:57:00 [facility]

 

                BASIC METABOLIC PANEL (NA, 2022 12:10:00 [facility]



                K, CL, CO2, GLUCOSE, BUN,                                 Medica

l Branch



                CREATININE, CA)                                 

 

                [facility]   [facility]

 

                URINALYSIS      2022 00:40:00 Suly Luna [facility]

 

                URINE CULTURE   2022 00:40:00 Suly Luna [facility]

 

                FECES [facility]   [facility]58[facility]

 

                OCCULT (GUAIAC) BLOOD 2022 14:58:00 [facility]

 

                CLOSTRIDIUM DIFFICILE 2022 14:58:00 [facility]

[facility]



                TOXIN                                           [facility]

 

                FECAL PATHOGENS BY PCR 2022 14:58:00 Teagan Ibanez  [facility]

 

                URINE DRUG (IMMUNOASSAY) - 2022 10:11:00 Teagan Ibanez

[facility]



                COMPREHENSIVE DRUG SCREEN                                 Medica

l Branch

 

                COVID-19 (ID NOW RAPID 2022 07:33:00 Ashley Garay [facility]



                TESTING)                                        [facility]

 

                LAB ONLY COVID  2022 07:33:00 Ashley Garay [facility]



                INTERPRETATION                                  [facility]

 

                COMP. METABOLIC PANEL 2022 06:18:00 Ashley Garay [facility]



                ([id number])                                         [facility]

 

                CBC WITH DIFF   2022 05:40:00 Ashley Garay [facility]

 

                URINALYSIS      2022 01:43:00 [facility] [facility]

 

                LIPASE          2022 01:40:00 Alma Villaseñor [facility]

 

                COMP. METABOLIC PANEL 2022 01:40:00 Alma Villaseñor [facility]



                ([id number])                                         [facility]

 

                CBC WITH DIFF   2022 01:38:00 Alma Villaseñor [facility]

 

                XR CHEST 1 VW   2022 23:40:19 Alma Villaseñor [facility]

 

                ASSIGNMENT OF BENEFITS 2022 22:05:40 Doctor Patricioigned, [facility]

 

                NOTICE OF PRIVACY 2022 22:04:58 Doctor Derrell, [facility]



                PRACTICES                       [facility]

 

                CONSENT/REFUSAL FOR 2022 22:04:33 Doctor Derrell, [facility]



                DIAGNOSIS AND TREATMENT                 [facility]

 

                URINALYSIS      2022-01-15 23:09:00 Neeta Maria [facility]

 

                BLOOD CULTURE SCREEN 2022-01-15 23:04:00 Neeta Maria [facility]

 

                D-DIMER         2022-01-15 22:49:00 Neeta Maria [facility]

 

                COVID-19 (ID NOW RAPID 2022-01-15 22:48:00 Neeta Maria [facility]

[facility] [place]



                TESTING)                                        [facility]

 

                COMP. METABOLIC PANEL 2022-01-15 22:17:00 Neeta Maria [facility]

[facility] [place]



                ([id number])                                         Medical Branch

 

                CBC WITH DIFF   2022-01-15 22:17:00 Neeta Maria [facility]

 

                XR CHEST 1 VW   2022-01-15 21:47:19 Neeta Maria [facility]

 

                COMP. METABOLIC PANEL 2021 22:20:00 Chris Mcpherson [facility]

[facility] [place]



                ([id number])                                         Medical Branch

 

                CBC WITH DIFF   2021 22:20:00 Singer, [facility] o

[facility]

 

                CT ABDOMEN PELVIS WO 2021-05-10 00:39:40 Acacia Najera F Uni

[facility] [place]



                CONTRAST                                        Medical Branch

 

                LIPASE          2021-05-10 00:06:00 Acacia Najera F [facility]

 

                COMP. METABOLIC PANEL 2021-05-10 00:06:00 [facility]

[facility]



                ([id number])                                         Medical Branch

 

                CBC WITH DIFF   2021-05-10 00:06:00 IbSoni cantuo F [facility]

 

                URINALYSIS      2021-05-10 00:00:00 Soni Najerao F [facility]

 

                COVID-19 (ID NOW RAPID 2021-05-10 00:00:00 Soni Najerao [facility]

[facility]



                TESTING)                                        Medical Branch

 

                Cholecystectomy                                 [facility]

 

                Shunt of cerebral                                 [facility]

nn



                ventricle to extracranial                                 



                site                                            







Plan of Care







             Planned Activity Planned Date Details      Comments     Source

 

             Future Scheduled 2023-01-10   Lipid panel               [facility]

s -



             Test         00:00:00     (procedure) [code =              Medical 

Center



                                       47176422]                 

 

             Future Scheduled 2021   INFLUENZA VACCINE              [facility]



             Test         00:00:00     (Season Ended) [code =              [facility]



                                       INFLUENZA VACCINE              



                                       (Season Ended)]              

 

             Future Scheduled 2021   DEPRESSION SCREENING              [facility]



             Test         00:00:00     (12+) [code =              Medical Center



                                       DEPRESSION SCREENING              



                                       (12+)]                    

 

             Future Scheduled 2020-04-10   Hemoglobin A1c              [facility]

[facility] -



             Test         00:00:00     measurement               Medical Center



                                       (procedure) [code =              



                                       93449895]                 

 

             Future Scheduled 2004   DTAP/TDAP/TD VACCINES              [facility]



             Test         00:00:00     (1 - Tdap) [code =              Medical C

enter



                                       DTAP/TDAP/TD VACCINES              



                                       (1 - Tdap)]               

 

             Future Scheduled 2003   HEPATITIS C SCREENING              [facility]



             Test         00:00:00     [code = HEPATITIS C              Medical 

Center



                                       SCREENING]                

 

             Future Scheduled 1997   COVID-19 VACCINE (1)              [facility]



             Test         00:00:00     [code = COVID-19              Medical Abilio

ter



                                       VACCINE (1)]              

 

             Future Scheduled 1995   DIABETIC EYE EXAM              [facility]



             Test         00:00:00     [code = DIABETIC EYE              Medical

 Center



                                       EXAM]                     

 

             Future Scheduled 1995   Urine screening for              [facility]



             Test         00:00:00     protein (procedure)              [facility] 

Center



                                       [code = 687857469]              

 

             Future Scheduled 1991   PNEUMOCOCCAL VACCINE              [facility]



             Test         00:00:00     0-64 YRS (1 of 1 -              Medical C

enter



                                       PPSV23) [code =              



                                       PNEUMOCOCCAL VACCINE              



                                       0-64 YRS (1 of 1 -              



                                       PPSV23)]                  







Encounters







        Start   End     Encounter Admission Attending Care    Care    Encounter 

Source



        Date/Time Date/Time Type    Type    Clinicians Facility Department ID   

   

 

        2022         Outpatient         Jesusita[facility]  [facility]  [id number]

 [facility]



        13:45:16                         Domingo                  [id number]   [facility]



                                                                        [facility]



                                                                        l



                                                                        Outpati



                                                                        ent



                                                                        Clinics

 

        2022         Outpatient         Jesusita[facility]  [facility]ARR[facility]LL  [facility]  [id number]

 [facility]



        13:17:39                         Domingo                  [id number]   [facility]



                                                                        [facility]



                                                                        l



                                                                        Outpati



                                                                        ent



                                                                        Clinics

 

        2022         Outpatient         Jesusita  STNICKY  [facility]  [id number]

 [facility]



        13:16:34                         Domingo                  [id number]   [facility]



                                                                        [facility]



                                                                        l



                                                                        Outpati



                                                                        ent



                                                                        Clinics

 

        2022 Emergency X       ALLISON MCKEON [facility]    ERT     [id number]

[id number] Univers



        18:08:00 22:51:00                                                 ity [facility]

 

        2022 Emergency         Agueda, K [facility]    1.2.840.114 [id number]

[id number] Univers



        18:08:00 22:51:00                 Elena MCCARTHY 350.1.13.10         i

ty of



                                                [facility] [id number]         [facility]  [phone]         Medi

sarah



                                                        084             Branch

 

        2022 Transition         Thomas  MAHENDRACANDIE  1.2.840.114 [id number]

[id number] Univers



        00:00:00 00:00:00 of Care         Dulce DUNN   350.1.13.10        

 ity of



                                                Busy   [id number]         [place]

[place]



                                                        [phone]         Medi

sarah



                                                        403             Branch

 

        2022 [facility]       [facility]    KR[facility]     [id number]

27 Univers



        19:07:00 19:39:00                 ASHLEY                          [facility]

 

        2022 [facility]    1.2.840.

114 [id number] 

Univers



        19:07:00 19:39:00 Encounter         Teagan Ibanez SHARI 350.1.13.10 

        ity of



                                                [place] [id number]         [facility]  [phone]         [address]

 

        20[facility]       [facility]     [id number]

54 Univers



        14:41:00 22:07:00                 [facility]

 

        2022 [facility]         [facility]    [id number]

[id number] Univers



        14:41:00 22:07:00                 Alma MCCARTHY 350.1.13.10         

ity of



                                                [place] [id number]         [facility]  [phone]         [address]

 

        2022-01-15 2022-01-15 Emergency X       [facility]    [facility]     [id number]

25 Univers



        14:49:00 21:33:00                 NEETA melton [facility]

 

        2022-01-15 2022-01-15 Emergency         [facility]    [id number]

0725 Univers



        14:49:00 21:33:00                 Neeta MCCARTHY 350.1.13.10         

ity of



                                                [place] [id number]         [facility]  [phone]         [address]

 

        2021 Laboratory         Only, Ang Db Test [facility]    1.2.8

40.114 [id number] 

Univers



        18:00:00 18:15:00 Only            [facility]  350.1.13.10      

   ity of



                                                [place] [id number]         Eric

as



                                                HÉCTOR?BLEA [phone]         [address]



                                                MEDICAL                 



                                                OFFICE                  



                                                BUILDING                 

 

        2021 Outpatient R       MARIO ALBERTO   [facility]    [id number]

[id number] Univers



        18:00:00 18:00:00                 NICOLE                          [facility]

 

        2021 ambulatory                 STLMLC  STLMLC  [id number]

 [facility]



        00:[facility]00                                                 [facility] 

-



                                                                        [facility]



                                                                        l



                                                                        Outpati



                                                                        ent



                                                                        Clinics

 

        2021 ambulatory                 STLMLC  STLMLC  [id number]

 [facility]



        00:[facility]00                                                 [facility] 

-



                                                                        [facility]



                                                                        l



                                                                        Outpati



                                                                        ent



                                                                        Clinics

 

        2021 Emergency X       SINGER, [facility]    ERT     [id number]

74 Univers



        15:54:00 18:34:00                 CHRIS                         [facility]

 

        2021 Emergency         [facility]    1.2.000.271 [id number]

[id number] Univers



        15:54:00 18:34:00                 Chris MCCARTHY 350.1.13.10         i

ty of



                                                [facility] [id number]         [facility]  [phone]         [address]

 

        2021-10-12 2021-10-12 Outpatient                 STLMLC  STLMLC  [id number]

 [facility]



        00:[facility]00[facility]00                                                 [facility] 

-



                                                                        Mem[facility]



                                                                        l



                                                                        Outpati



                                                                        ent



                                                                        Clinics

 

        2021 Outpatient                 STLMLC  STLMLC  [id number]

 [facility]



        00:[facility]00[facility]00                                                 [facility] 

-



                                                                        Memoria



                                                                        l



                                                                        Outpati



                                                                        ent



                                                                        Clinics

 

        2021-08-10 2021-08-10 Outpatient                 STLMLC  STLMLC  [id number]

 [facility]



        00:[facility]00[facility]00                                                 [facility] 

-



                                                                        Mem[facility]



                                                                        l



                                                                        Outpati



                                                                        ent



                                                                        Clinics

 

        2021 [facility]                 [facility]  [id number]

 [facility]



        00:[facility]00:00                                                 [facility] 

-



                                                                        [facility]



                                                                        l



                                                                        Outpati



                                                                        ent



                                                                        Clinics

 

        2021 [facility]                 [facility]  [id number]

 [facility]



        00:00:00 00:00:00                                                 [facility] 

-



                                                                        [facility]



                                                                        l



                                                                        Outpati



                                                                        ent



                                                                        Clinics

 

        2021 Emergency         [facility]    1.2.840.114 [id number]

[id number] 



        18:22:00 22:21:00                 Acacia TRENT Carson 350.1.13.10        

 



                                                [place] 4.2.7.2.686         



                                                [place]  [phone]         



                                                        [facility]             

 

        2021 Emergency         [facility]    1.2.840.114 [id number]

[id number] [facility]



        18:22:00 22:21:00                 Acacia Mccarthy 350.1.13.10        

 [facility] 4.2.7.2.686         [facility]  [phone]         [address]

 

        2021 Emergency X       [facility]    ERT     [id number]

4744 Univers



        18:22:00 18:22:00                 ACACIA                         [facility]

 

        2019 Phone                   nullFlavo MNA     [id number]

55 Memoria



        16:11:26 04:59:59 Message                 r       Neurosurger 08      l



                                                        y [facility]

 

        2019 Phone                   nullFlavo MNA     [id number]

55 Memoria



        16:16:47 04:59:59 Message                 r       Neurosurger 07      l



                                                        y [facility]

 

        2019-07-15 2019 Phone                   nullFlavo MNA     [id number]

55 Memoria



        15:52:03 04:59:59 Message                 r       Neurosurger 06      l



                                                        y [facility]

 

        2019 Phone                   nullFlavo MNA     [id number]

55 Memoria



        18:55:03 04:59:59 Message                 r       Neurosurger 05      l



                                                        y [facility]

 

        2018 Emergency                 [facility] [id number]

[address] Memoria



        10:12:00 18:24:00                         r       [employer] 12      [facility]

 

        2018 Outpatient                 [facility] [address]

[id number] [facility]



        11:15:00 11:15:00                         t [employer] [employer]

s -



                                                [facility]   [facility]



                                                Medicine                 Outpati



                                                                        ent



                                                                        Clinics

 

        2018 Outpatient                 [facility] [address]

[id number] [facility]



        11:30:00 11:30:00                         t [place]   [place]

s -



                                                [facility]   [facility]



                                                Medicine                 Outpati



                                                                        ent



                                                                        Clinics

 

        2018 Outpatient                 [facility] [address]

[id number] [facility]



        15:41:00 15:41:00                         t [place]   [place]

s -



                                                [facility]   [facility]                 Outpati



                                                                        ent



                                                                        Clinics

 

        2018 Outpatient                 [facility] [address]

[id number] [facility]



        15:42:00 15:42:00                         t [place]   [place]

s -



                                                [facility]   [facility]



                                                Medicine                 Outpati



                                                                        ent



                                                                        Clinics

 

        2018 Outpatient                 [facility] [address]

[id number] [facility]



        11:00:00 11:00:00                         t [place]   [place]

s -



                                                [facility]   [facility]                 Outpati



                                                                        ent



                                                                        Clinics

 

        2018 Inpatient                 [facility] [id number]

[id number] [facility]



        22:40:00 17:28:00                         [facility] 23      [facility]







Results







           Test Description Test Time  Test Comments Results    Result Comments 

Source









                    CBC WITH DIFF       2022 00:23:28 









                      Test Item  Value      Reference Range Interpretation Comme

nts









             WBC (test code = 6690-2)              See_Comment  H             [A

utomated message] The



                                                                 system which ge

nerated this



                                                                 result transmit

huma reference



                                                                 range: 4.20 - 1

0.70 10*3/?L.



                                                                 The reference r

zhen was not



                                                                 used to interpr

et this result



                                                                 as normal/abnor

mal.

 

             RBC (test code = 789-8)              See_Comment  H             [Au

tomated message] The



                                                                 system which ge

nerated this



                                                                 result transmit

huma reference



                                                                 range: 4.26 - 5

.52 10*6/?L.



                                                                 The reference r

zhen was not



                                                                 used to interpr

et this result



                                                                 as normal/abnor

mal.

 

             HGB (test code = 718-7) 18.3 g/dL    12.2-16.4    H            

 

             HCT (test code = 4544-3) 55.6 %       38.4-49.3    H            

 

             MCV (test code = 787-2) 89.0 fL      81.7-95.6                 

 

             MCH (test code = 785-6) 29.3 pg      26.1-32.7                 

 

             MCHC (test code = 786-4) 32.9 g/dL    31.2-35.0                 

 

             RDW-SD (test code = 09718-7) 47.6 fL      38.5-51.6                

 

 

             RDW-CV (test code = 788-0) 15.1 %       12.1-15.4                 

 

             PLT (test code = 777-3)              See_Comment  H             [Au

tomated message] The



                                                                 system which ge

nerated this



                                                                 result transmit

huma reference



                                                                 range: 150 - 32

8 10*3/?L. The



                                                                 reference range

 was not used



                                                                 to interpret th

is result as



                                                                 normal/abnormal

.

 

             MPV (test code = 34332-7) 10.5 fL      9.8-13.0                  

 

             NRBC/100 WBC (test code =              See_Comment                [

Automated message] The



             [id number])                                         system which ge

nerated this



                                                                 result transmit

huma reference



                                                                 range: 0.0 - 10

.0 /100 WBCs.



                                                                 The reference r

zhen was not



                                                                 used to interpr

et this result



                                                                 as normal/abnor

mal.

 

             NRBC x10^3 (test code = <0.01        See_Comment                [Au

tomated message] The



             [id number])                                         system which [employer]

nerated this



                                                                 result transmit

huma reference



                                                                 range: 10*3/?L.

 The reference



                                                                 range was not u

sed to



                                                                 interpret this 

result as



                                                                 normal/abnormal

.

 

             GRAN MAT (NEUT) % (test code 66.7 %                                

 



             = 770-8)                                            

 

             IMM GRAN % (test code = 0.40 %                                 



             1232285830)                                         

 

             LYMPH % (test code = 736-9) 24.4 %                                 

 

             MONO % (test code = 5905-5) 6.6 %                                  

 

             EOS % (test code = 713-8) 1.5 %                                  

 

             BASO % (test code = 706-2) 0.4 %                                  

 

             GRAN MAT x10^3(ANC) (test 7.43 10*3/uL 1.99-6.95    H            



             code = 2930252575)                                        

 

             IMM GRAN x10^3 (test code = 0.04 10*3/uL 0.00-0.06                 



             8198111022)                                         

 

             LYMPH x10^3 (test code = 2.72 10*3/uL 1.09-3.23                 



             731-0)                                              

 

             MONO x10^3 (test code = 0.74 10*3/uL 0.36-1.02                 



             742-7)                                              

 

             EOS x10^3 (test code = 0.17 10*3/uL 0.06-0.53                 



             711-2)                                              

 

             BASO x10^3 (test code = 0.04 10*3/uL 0.01-0.09                 



             704-7)                                              

 

             Lab Interpretation (test Abnormal                               



             code = 46551-4)                                        



[facility] [place]. METABOLIC PANEL ([id number] 
00:18:07





             Test Item    Value        Reference Range Interpretation Comments

 

             NA (test code = 139 mmol/L   135-145                   



             5691127936)                                         

 

             K (test code = 4.8 mmol/L   3.5-5.0                   



             0639576442)                                         

 

             CL (test code = 104 mmol/L                       



             5413839759)                                         

 

             CO2 TOTAL (test code = 22 mmol/L    23-31        L            



             8024998922)                                         

 

             AGAP (test code =              2-16                      



             6693343288)                                         

 

             BUN (test code = 15 mg/dL     7-23                      



             5815850168)                                         

 

             GLUCOSE (test code = 93 mg/dL                         



             9193568562)                                         

 

             CREATININE (test code = 0.67 mg/dL   0.60-1.25                 



             5253031999)                                         

 

             TOTAL BILI (test code = 0.7 mg/dL    0.1-1.1                   



             5322754706)                                         

 

             CALCIUM (test code = 9.8 mg/dL    8.6-10.6                  



             1465451276)                                         

 

             T PROTEIN (test code = 8.9 g/dL     6.3-8.2      H            



             3506524359)                                         

 

             ALBUMIN (test code = 4.9 g/dL     3.5-5.0                   



             7264160157)                                         

 

             ALK PHOS (test code = 126 U/L             H            



             6700300304)                                         

 

             ALTv (test code = 43 U/L       5-50                      



             1742-6)                                             

 

             AST(SGOT) (test code = 28 U/L       13-40                     



             1217936015)                                         

 

             eGFR (test code =              mL/min/1.73m2              



             8362527518)                                         

 

             MAL (test code = MAL) Association of                           



                          Glomerular Filtration                           



                          Rate (GFR) and Staging                           



                          of Kidney Disease*                           



                          +---------------------                           



                          --+-------------------                           



                          --+-------------------                           



                          ------+| GFR                           



                          (mL/min/1.73 m2) ?|                           



                          With Kidney Damage ?|                           



                          ?Without Kidney                           



                          Damage+---------------                           



                          --------+-------------                           



                          --------+-------------                           



                          ------------+| ?>90 ?                           



                          ? ? ? ? ? ? ? ?|                           



                          ?Stage one ? ? ? ? ?|                           



                          ? Normal ? ? ? ? ? ? ?                           



                          ?+--------------------                           



                          ---+------------------                           



                          ---+------------------                           



                          -------+| ?60-89 ? ? ?                           



                          ? ? ? ? ?| ?Stage two                           



                          ? ? ? ? ?| ? Decreased                           



                          GFR ? ? ? ?                            



                          +---------------------                           



                          --+-------------------                           



                          --+-------------------                           



                          ------+| ?30-59 ? ? ?                           



                          ? ? ? ? ?| ?Stage                           



                          three ? ? ? ?| ? Stage                           



                          three ? ? ? ? ?                           



                          +---------------------                           



                          --+-------------------                           



                          --+-------------------                           



                          ------+| ?15-29 ? ? ?                           



                          ? ? ? ? ?| ?Stage four                           



                          ? ? ? ? | ? Stage four                           



                          ? ? ? ? ?                              



                          ?+--------------------                           



                          ---+------------------                           



                          ---+------------------                           



                          -------+| ?<15 (or                           



                          dialysis) ? ?| ?Stage                           



                          five ? ? ? ? | ? Stage                           



                          five ? ? ? ? ?                           



                          ?+--------------------                           



                          ---+------------------                           



                          ---+------------------                           



                          -------+ *Each stage                           



                          assumes the associated                           



                          GFR level has been in                           



                          effect for at least                           



                          three months. ?Stages                           



                          1 to 5, with or                           



                          without kidney                           



                          disease, indicate                           



                          chronic kidney                           



                          disease. Notes:                           



                          Determination of                           



                          stages one and two                           



                          (with eGFR                             



                          >59mL/min/1.73 m2)                           



                          requires estimation of                           



                          kidney damage for at                           



                          least three months as                           



                          defined by structural                           



                          or functional                           



                          abnormalities of the                           



                          kidney, manifested by                           



                          either:Pathological                           



                          abnormalities or                           



                          Markers of kidney                           



                          damage (including                           



                          abnormalities in the                           



                          composition of the                           



                          blood or urine or                           



                          abnormalities in                           



                          imaging tests).                           

 

             Lab Interpretation Abnormal                               



             (test code = 32176-9)                                        



[facility] [place]. METABOLIC PANEL ([id number] 
12:48:40





             Test Item    Value        Reference Range Interpretation Comments

 

             NA (test code = 137 mmol/L   135-145                   



             6763550728)                                         

 

             K (test code = 4.5 mmol/L   3.5-5.0                   



             2728288501)                                         

 

             CL (test code = 107 mmol/L                       



             2819115578)                                         

 

             CO2 TOTAL (test code = 24 mmol/L    23-31                     



             7367949411)                                         

 

             AGAP (test code =              2-16                      



             8685511926)                                         

 

             BUN (test code = 16 mg/dL     7-23                      



             4149987620)                                         

 

             GLUCOSE (test code = 116 mg/dL           H            



             0336792757)                                         

 

             CREATININE (test code = 0.62 mg/dL   0.60-1.25                 



             5559038030)                                         

 

             TOTAL BILI (test code = 0.4 mg/dL    0.1-1.1                   



             3366575569)                                         

 

             CALCIUM (test code = 8.7 mg/dL    8.6-10.6                  



             8893446919)                                         

 

             T PROTEIN (test code = 7.5 g/dL     6.3-8.2                   



             7654315795)                                         

 

             ALBUMIN (test code = 3.9 g/dL     3.5-5.0                   



             7896970547)                                         

 

             ALK PHOS (test code = 93 U/L                           



             8771397957)                                         

 

             ALTv (test code = 40 U/L       5-50                      



             1742-6)                                             

 

             AST(SGOT) (test code = 51 U/L       13-40        H            



             4786598843)                                         

 

             eGFR (test code =              mL/min/1.73m2              



             6926582682)                                         

 

             MAL (test code = MAL) Association of                           



                          Glomerular Filtration                           



                          Rate (GFR) and Staging                           



                          of Kidney Disease*                           



                          +---------------------                           



                          --+-------------------                           



                          --+-------------------                           



                          ------+| GFR                           



                          (mL/min/1.73 m2) ?|                           



                          With Kidney Damage ?|                           



                          ?Without Kidney                           



                          Damage+---------------                           



                          --------+-------------                           



                          --------+-------------                           



                          ------------+| ?>90 ?                           



                          ? ? ? ? ? ? ? ?|                           



                          ?Stage one ? ? ? ? ?|                           



                          ? Normal ? ? ? ? ? ? ?                           



                          ?+--------------------                           



                          ---+------------------                           



                          ---+------------------                           



                          -------+| ?60-89 ? ? ?                           



                          ? ? ? ? ?| ?Stage two                           



                          ? ? ? ? ?| ? Decreased                           



                          GFR ? ? ? ?                            



                          +---------------------                           



                          --+-------------------                           



                          --+-------------------                           



                          ------+| ?30-59 ? ? ?                           



                          ? ? ? ? ?| ?Stage                           



                          three ? ? ? ?| ? Stage                           



                          three ? ? ? ? ?                           



                          +---------------------                           



                          --+-------------------                           



                          --+-------------------                           



                          ------+| ?15-29 ? ? ?                           



                          ? ? ? ? ?| ?Stage four                           



                          ? ? ? ? | ? Stage four                           



                          ? ? ? ? ?                              



                          ?+--------------------                           



                          ---+------------------                           



                          ---+------------------                           



                          -------+| ?<15 (or                           



                          dialysis) ? ?| ?Stage                           



                          five ? ? ? ? | ? Stage                           



                          five ? ? ? ? ?                           



                          ?+--------------------                           



                          ---+------------------                           



                          ---+------------------                           



                          -------+ *Each stage                           



                          assumes the associated                           



                          GFR level has been in                           



                          effect for at least                           



                          three months. ?Stages                           



                          1 to 5, with or                           



                          without kidney                           



                          disease, indicate                           



                          chronic kidney                           



                          disease. Notes:                           



                          Determination of                           



                          stages one and two                           



                          (with eGFR                             



                          >59mL/min/1.73 m2)                           



                          requires estimation of                           



                          kidney damage for at                           



                          least three months as                           



                          defined by structural                           



                          or functional                           



                          abnormalities of the                           



                          kidney, manifested by                           



                          either:Pathological                           



                          abnormalities or                           



                          Markers of kidney                           



                          damage (including                           



                          abnormalities in the                           



                          composition of the                           



                          blood or urine or                           



                          abnormalities in                           



                          imaging tests).                           

 

             Lab Interpretation Abnormal                               



             (test code = 04619-3)                                        



[facility] WITH [id number] 12:21:36





             Test Item    Value        Reference Range Interpretation Comments

 

             WBC (test code =              See_Comment                [Automated



             6690-2)                                             message] The sy

stem



                                                                 which generated



                                                                 this result



                                                                 transmitted



                                                                 reference range

:



                                                                 4.20 - 10.70



                                                                 10*3/?L. The



                                                                 reference range

 was



                                                                 not used to



                                                                 interpret this



                                                                 result as



                                                                 normal/abnormal

.

 

             RBC (test code =              See_Comment                [Automated



             789-8)                                              message] The sy

stem



                                                                 which generated



                                                                 this result



                                                                 transmitted



                                                                 reference range

:



                                                                 4.26 - 5.52



                                                                 10*6/?L. The



                                                                 reference range

 was



                                                                 not used to



                                                                 interpret this



                                                                 result as



                                                                 normal/abnormal

.

 

             HGB (test code = 15.7 g/dL    12.2-16.4                 



             718-7)                                              

 

             HCT (test code = 48.0 %       38.4-49.3                 



             4544-3)                                             

 

             MCV (test code = 90.1 fL      81.7-95.6                 



             787-2)                                              

 

             MCH (test code = 29.5 pg      26.1-32.7                 



             785-6)                                              

 

             MCHC (test code = 32.7 g/dL    31.2-35.0                 



             786-4)                                              

 

             RDW-SD (test code = 50.6 fL      38.5-51.6                 



             29734-9)                                            

 

             RDW-CV (test code = 15.3 %       12.1-15.4                 



             788-0)                                              

 

             PLT (test code =              See_Comment  H             [Automated



             777-3)                                              message] The sy

stem



                                                                 which generated



                                                                 this result



                                                                 transmitted



                                                                 reference range

:



                                                                 150 - 328 10*3/

?L.



                                                                 The reference r

zhen



                                                                 was not used to



                                                                 interpret this



                                                                 result as



                                                                 normal/abnormal

.

 

             MPV (test code = 10.3 fL      9.8-13.0                  



             34484-5)                                            

 

             NRBC/100 WBC (test              See_Comment                [Automat

ed



             code = 7615532761)                                        message] 

The system



                                                                 which generated



                                                                 this result



                                                                 transmitted



                                                                 reference range

:



                                                                 0.0 - 10.0 /100



                                                                 WBCs. The refer

ence



                                                                 range was not u

sed



                                                                 to interpret th

is



                                                                 result as



                                                                 normal/abnormal

.

 

             NRBC x10^3 (test code <0.01        See_Comment                [Auto

mated



             = [id number])                                        message] The s

ystem



                                                                 which generated



                                                                 this result



                                                                 transmitted



                                                                 reference range

:



                                                                 10*3/?L. The



                                                                 reference range

 was



                                                                 not used to



                                                                 interpret this



                                                                 result as



                                                                 normal/abnormal

.

 

             GRAN MAT (NEUT) % 61.5 %                                 



             (test code = 770-8)                                        

 

             IMM GRAN % (test code 0.80 %                                 



             = [id number])                                        

 

             LYMPH % (test code = 27.8 %                                 



             736-9)                                              

 

             MONO % (test code = 6.9 %                                  



             5905-5)                                             

 

             EOS % (test code = 2.4 %                                  



             713-8)                                              

 

             BASO % (test code = 0.6 %                                  



             706-2)                                              

 

             GRAN MAT x10^3(ANC) 6.07 10*3/uL 1.99-6.95                 



             (test code =                                        



             7859331367)                                         

 

             IMM GRAN x10^3 (test 0.08 10*3/uL 0.00-0.06    H            



             code = 2492735953)                                        

 

             LYMPH x10^3 (test code 2.75 10*3/uL 1.09-3.23                 



             = 731-0)                                            

 

             MONO x10^3 (test code 0.68 10*3/uL 0.36-1.02                 



             = 742-7)                                            

 

             EOS x10^3 (test code = 0.24 10*3/uL 0.06-0.53                 



             711-2)                                              

 

             BASO x10^3 (test code 0.06 10*3/uL 0.01-0.09                 



             = 704-7)                                            

 

             Lab Interpretation Abnormal                               



             (test code = 77313-4)                                        



[facility] Metabolic Panel (NA, K, CL, CO2, 
GLUCOSE, BUN, CREATININE, CA)2022 12:40:30





             Test Item    Value        Reference Range Interpretation Comments

 

             NA (test code = 139 mmol/L   135-145                   



             4684313117)                                         

 

             K (test code = 3.9 mmol/L   3.5-5.0                   



             8577474855)                                         

 

             CL (test code = 108 mmol/L                       



             3599740947)                                         

 

             CO2 TOTAL (test code = 25 mmol/L    23-31                     



             8710164797)                                         

 

             AGAP (test code =              2-16                      



             2343182520)                                         

 

             BUN (test code = 14 mg/dL     7-23                      



             8806668617)                                         

 

             GLUCOSE (test code = 107 mg/dL                        



             3864239479)                                         

 

             CREATININE (test code = 0.61 mg/dL   0.60-1.25                 



             3170056387)                                         

 

             CALCIUM (test code = 8.1 mg/dL    8.6-10.6     L            



             2043176464)                                         

 

             eGFR (test code =              mL/min/1.73m2              



             4240008364)                                         

 

             MAL (test code = MAL) Association of                           



                          Glomerular Filtration                           



                          Rate (GFR) and Staging                           



                          of Kidney Disease*                           



                          +---------------------                           



                          --+-------------------                           



                          --+-------------------                           



                          ------+| GFR                           



                          (mL/min/1.73 m2) ?|                           



                          With Kidney Damage ?|                           



                          ?Without Kidney                           



                          Damage+---------------                           



                          --------+-------------                           



                          --------+-------------                           



                          ------------+| ?>90 ?                           



                          ? ? ? ? ? ? ? ?|                           



                          ?Stage one ? ? ? ? ?|                           



                          ? Normal ? ? ? ? ? ? ?                           



                          ?+--------------------                           



                          ---+------------------                           



                          ---+------------------                           



                          -------+| ?60-89 ? ? ?                           



                          ? ? ? ? ?| ?Stage two                           



                          ? ? ? ? ?| ? Decreased                           



                          GFR ? ? ? ?                            



                          +---------------------                           



                          --+-------------------                           



                          --+-------------------                           



                          ------+| ?30-59 ? ? ?                           



                          ? ? ? ? ?| ?Stage                           



                          three ? ? ? ?| ? Stage                           



                          three ? ? ? ? ?                           



                          +---------------------                           



                          --+-------------------                           



                          --+-------------------                           



                          ------+| ?15-29 ? ? ?                           



                          ? ? ? ? ?| ?Stage four                           



                          ? ? ? ? | ? Stage four                           



                          ? ? ? ? ?                              



                          ?+--------------------                           



                          ---+------------------                           



                          ---+------------------                           



                          -------+| ?<15 (or                           



                          dialysis) ? ?| ?Stage                           



                          five ? ? ? ? | ? Stage                           



                          five ? ? ? ? ?                           



                          ?+--------------------                           



                          ---+------------------                           



                          ---+------------------                           



                          -------+ *Each stage                           



                          assumes the associated                           



                          GFR level has been in                           



                          effect for at least                           



                          three months. ?Stages                           



                          1 to 5, with or                           



                          without kidney                           



                          disease, indicate                           



                          chronic kidney                           



                          disease. Notes:                           



                          Determination of                           



                          stages one and two                           



                          (with eGFR                             



                          >59mL/min/1.73 m2)                           



                          requires estimation of                           



                          kidney damage for at                           



                          least three months as                           



                          defined by structural                           



                          or functional                           



                          abnormalities of the                           



                          kidney, manifested by                           



                          either:Pathological                           



                          abnormalities or                           



                          Markers of kidney                           



                          damage (including                           



                          abnormalities in the                           



                          composition of the                           



                          blood or urine or                           



                          abnormalities in                           



                          imaging tests).                           

 

             Lab Interpretation Abnormal                               



             (test code = 17148-2)                                        



[facility] with [id number] 12:23:51





             Test Item    Value        Reference Range Interpretation Comments

 

             WBC (test code =              See_Comment                [Automated



             8139-2)                                             message] The sy

stem



                                                                 which generated



                                                                 this result



                                                                 transmitted



                                                                 reference range

:



                                                                 4.20 - 10.70



                                                                 10*3/?L. The



                                                                 reference range

 was



                                                                 not used to



                                                                 interpret this



                                                                 result as



                                                                 normal/abnormal

.

 

             RBC (test code =              See_Comment                [Automated



             814-2)                                              message] The sy

stem



                                                                 which generated



                                                                 this result



                                                                 transmitted



                                                                 reference range

:



                                                                 4.26 - 5.52



                                                                 10*6/?L. The



                                                                 reference range

 was



                                                                 not used to



                                                                 interpret this



                                                                 result as



                                                                 normal/abnormal

.

 

             HGB (test code = 14.9 g/dL    12.2-16.4                 



             718-7)                                              

 

             HCT (test code = 44.2 %       38.4-49.3                 



             4544-3)                                             

 

             MCV (test code = 88.4 fL      81.7-95.6                 



             787-2)                                              

 

             MCH (test code = 29.8 pg      26.1-32.7                 



             785-6)                                              

 

             MCHC (test code = 33.7 g/dL    31.2-35.0                 



             786-4)                                              

 

             RDW-SD (test code = 49.3 fL      38.5-51.6                 



             57294-8)                                            

 

             RDW-CV (test code = 15.3 %       12.1-15.4                 



             788-0)                                              

 

             PLT (test code =              See_Comment  H             [Automated



             777-3)                                              message] The sy

stem



                                                                 which generated



                                                                 this result



                                                                 transmitted



                                                                 reference range

:



                                                                 150 - 328 10*3/

?L.



                                                                 The reference r

zhen



                                                                 was not used to



                                                                 interpret this



                                                                 result as



                                                                 normal/abnormal

.

 

             MPV (test code = 10.2 fL      9.8-13.0                  



             45571-0)                                            

 

             NRBC/100 WBC (test              See_Comment                [Automat

ed



             code = 1317755203)                                        message] 

The system



                                                                 which generated



                                                                 this result



                                                                 transmitted



                                                                 reference range

:



                                                                 0.0 - 10.0 /100



                                                                 WBCs. The refer

ence



                                                                 range was not u

sed



                                                                 to interpret th

is



                                                                 result as



                                                                 normal/abnormal

.

 

             NRBC x10^3 (test code <0.01        See_Comment                [Auto

mated



             = 7259725685)                                        message] The s

ystem



                                                                 which generated



                                                                 this result



                                                                 transmitted



                                                                 reference range

:



                                                                 10*3/?L. The



                                                                 reference range

 was



                                                                 not used to



                                                                 interpret this



                                                                 result as



                                                                 normal/abnormal

.

 

             GRAN MAT (NEUT) % 56.7 %                                 



             (test code = 770-8)                                        

 

             IMM GRAN % (test code 0.60 %                                 



             = 3930447878)                                        

 

             LYMPH % (test code = 31.1 %                                 



             736-9)                                              

 

             MONO % (test code = 8.7 %                                  



             5905-5)                                             

 

             EOS % (test code = 2.4 %                                  



             713-8)                                              

 

             BASO % (test code = 0.5 %                                  



             706-2)                                              

 

             GRAN MAT x10^3(ANC) 4.83 10*3/uL 1.99-6.95                 



             (test code =                                        



             6803891581)                                         

 

             IMM GRAN x10^3 (test 0.05 10*3/uL 0.00-0.06                 



             code = 7990947919)                                        

 

             LYMPH x10^3 (test code 2.64 10*3/uL 1.09-3.23                 



             = 731-0)                                            

 

             MONO x10^3 (test code 0.74 10*3/uL 0.36-1.02                 



             = 742-7)                                            

 

             EOS x10^3 (test code = 0.20 10*3/uL 0.06-0.53                 



             711-2)                                              

 

             BASO x10^3 (test code 0.04 10*3/uL 0.01-0.09                 



             = 704-7)                                            

 

             Lab Interpretation Abnormal                               



             (test code = 45250-2)                                        



[facility]. METABOLIC PANEL ([id number])2022 
06:32:14





             Test Item    Value        Reference Range Interpretation Comments

 

             NA (test code = 139 mmol/L   135-145                   



             7823386922)                                         

 

             K (test code = 4.5 mmol/L   3.5-5.0                   



             1008733970)                                         

 

             CL (test code = 102 mmol/L                       



             3124022936)                                         

 

             CO2 TOTAL (test code = 26 mmol/L    23-31                     



             0976779440)                                         

 

             AGAP (test code =              2-16                      



             2596745133)                                         

 

             BUN (test code = 16 mg/dL     7-23                      



             9623017020)                                         

 

             GLUCOSE (test code = 99 mg/dL                         



             7910043692)                                         

 

             CREATININE (test code = 0.83 mg/dL   0.60-1.25                 



             9912496502)                                         

 

             TOTAL BILI (test code = 0.6 mg/dL    0.1-1.1                   



             6969495886)                                         

 

             CALCIUM (test code = 9.5 mg/dL    8.6-10.6                  



             8795352235)                                         

 

             T PROTEIN (test code = 8.6 g/dL     6.3-8.2      H            



             6330400204)                                         

 

             ALBUMIN (test code = 4.6 g/dL     3.5-5.0                   



             4626406339)                                         

 

             ALK PHOS (test code = 121 U/L                          



             0916630819)                                         

 

             ALTv (test code = 58 U/L       5-50         H            



             1742-6)                                             

 

             AST(SGOT) (test code = 32 U/L       13-40                     



             9573724040)                                         

 

             eGFR (test code =              mL/min/1.73m2              



             5938087519)                                         

 

             MAL (test code = MAL) Association of                           



                          Glomerular Filtration                           



                          Rate (GFR) and Staging                           



                          of Kidney Disease*                           



                          +---------------------                           



                          --+-------------------                           



                          --+-------------------                           



                          ------+| GFR                           



                          (mL/min/1.73 m2) ?|                           



                          With Kidney Damage ?|                           



                          ?Without Kidney                           



                          Damage+---------------                           



                          --------+-------------                           



                          --------+-------------                           



                          ------------+| ?>90 ?                           



                          ? ? ? ? ? ? ? ?|                           



                          ?Stage one ? ? ? ? ?|                           



                          ? Normal ? ? ? ? ? ? ?                           



                          ?+--------------------                           



                          ---+------------------                           



                          ---+------------------                           



                          -------+| ?60-89 ? ? ?                           



                          ? ? ? ? ?| ?Stage two                           



                          ? ? ? ? ?| ? Decreased                           



                          GFR ? ? ? ?                            



                          +---------------------                           



                          --+-------------------                           



                          --+-------------------                           



                          ------+| ?30-59 ? ? ?                           



                          ? ? ? ? ?| ?Stage                           



                          three ? ? ? ?| ? Stage                           



                          three ? ? ? ? ?                           



                          +---------------------                           



                          --+-------------------                           



                          --+-------------------                           



                          ------+| ?15-29 ? ? ?                           



                          ? ? ? ? ?| ?Stage four                           



                          ? ? ? ? | ? Stage four                           



                          ? ? ? ? ?                              



                          ?+--------------------                           



                          ---+------------------                           



                          ---+------------------                           



                          -------+| ?<15 (or                           



                          dialysis) ? ?| ?Stage                           



                          five ? ? ? ? | ? Stage                           



                          five ? ? ? ? ?                           



                          ?+--------------------                           



                          ---+------------------                           



                          ---+------------------                           



                          -------+ *Each stage                           



                          assumes the associated                           



                          GFR level has been in                           



                          effect for at least                           



                          three months. ?Stages                           



                          1 to 5, with or                           



                          without kidney                           



                          disease, indicate                           



                          chronic kidney                           



                          disease. Notes:                           



                          Determination of                           



                          stages one and two                           



                          (with eGFR                             



                          >59mL/min/1.73 m2)                           



                          requires estimation of                           



                          kidney damage for at                           



                          least three months as                           



                          defined by structural                           



                          or functional                           



                          abnormalities of the                           



                          kidney, manifested by                           



                          either:Pathological                           



                          abnormalities or                           



                          Markers of kidney                           



                          damage (including                           



                          abnormalities in the                           



                          composition of the                           



                          blood or urine or                           



                          abnormalities in                           



                          imaging tests).                           

 

             Lab Interpretation Abnormal                               



             (test code = 42455-6)                                        



[facility] WITH [id number] 05:48:31





             Test Item    Value        Reference Range Interpretation Comments

 

             WBC (test code =              See_Comment  H             [Automated



             5193-2)                                             message] The sy

stem



                                                                 which generated



                                                                 this result



                                                                 transmitted



                                                                 reference range

:



                                                                 4.20 - 10.70



                                                                 10*3/?L. The



                                                                 reference range

 was



                                                                 not used to



                                                                 interpret this



                                                                 result as



                                                                 normal/abnormal

.

 

             RBC (test code =              See_Comment  H             [Automated



             739-8)                                              message] The sy

stem



                                                                 which generated



                                                                 this result



                                                                 transmitted



                                                                 reference range

:



                                                                 4.26 - 5.52



                                                                 10*6/?L. The



                                                                 reference range

 was



                                                                 not used to



                                                                 interpret this



                                                                 result as



                                                                 normal/abnormal

.

 

             HGB (test code = 17.3 g/dL    12.2-16.4    H            



             718-7)                                              

 

             HCT (test code = 51.4 %       38.4-49.3    H            



             4544-3)                                             

 

             MCV (test code = 87.7 fL      81.7-95.6                 



             787-2)                                              

 

             MCH (test code = 29.5 pg      26.1-32.7                 



             785-6)                                              

 

             MCHC (test code = 33.7 g/dL    31.2-35.0                 



             786-4)                                              

 

             RDW-SD (test code = 49.1 fL      38.5-51.6                 



             60745-8)                                            

 

             RDW-CV (test code = 15.5 %       12.1-15.4    H            



             788-0)                                              

 

             PLT (test code =              See_Comment  H             [Automated



             777-3)                                              message] The sy

stem



                                                                 which generated



                                                                 this result



                                                                 transmitted



                                                                 reference range

:



                                                                 150 - 328 10*3/

?L.



                                                                 The reference r

zhen



                                                                 was not used to



                                                                 interpret this



                                                                 result as



                                                                 normal/abnormal

.

 

             MPV (test code = 10.4 fL      9.8-13.0                  



             62501-3)                                            

 

             NRBC/100 WBC (test              See_Comment                [Automat

ed



             code = 2509226835)                                        message] 

The system



                                                                 which generated



                                                                 this result



                                                                 transmitted



                                                                 reference range

:



                                                                 0.0 - 10.0 /100



                                                                 WBCs. The refer

ence



                                                                 range was not u

sed



                                                                 to interpret th

is



                                                                 result as



                                                                 normal/abnormal

.

 

             NRBC x10^3 (test code <0.01        See_Comment                [Auto

mated



             = [id number])                                        message] The s

ystem



                                                                 which generated



                                                                 this result



                                                                 transmitted



                                                                 reference range

:



                                                                 10*3/?L. The



                                                                 reference range

 was



                                                                 not used to



                                                                 interpret this



                                                                 result as



                                                                 normal/abnormal

.

 

             GRAN MAT (NEUT) % 64.8 %                                 



             (test code = 770-8)                                        

 

             IMM GRAN % (test code 0.70 %                                 



             = 3375746390)                                        

 

             LYMPH % (test code = 25.2 %                                 



             736-9)                                              

 

             MONO % (test code = 6.9 %                                  



             5905-5)                                             

 

             EOS % (test code = 1.9 %                                  



             713-8)                                              

 

             BASO % (test code = 0.5 %                                  



             706-2)                                              

 

             GRAN MAT x10^3(ANC) 7.80 10*3/uL 1.99-6.95    H            



             (test code =                                        



             7451008208)                                         

 

             IMM GRAN x10^3 (test 0.08 10*3/uL 0.00-0.06    H            



             code = 9369982371)                                        

 

             LYMPH x10^3 (test code 3.04 10*3/uL 1.09-3.23                 



             = 731-0)                                            

 

             MONO x10^3 (test code 0.83 10*3/uL 0.36-1.02                 



             = 742-7)                                            

 

             EOS x10^3 (test code = 0.23 10*3/uL 0.06-0.53                 



             711-2)                                              

 

             BASO x10^3 (test code 0.06 10*3/uL 0.01-0.09                 



             = 704-7)                                            

 

             Lab Interpretation Abnormal                               



             (test code = 85182-1)                                        



[facility]. METABOLIC PANEL ([id number] 
02:19:08





             Test Item    Value        Reference Range Interpretation Comments

 

             NA (test code = 136 mmol/L   135-145                   



             5939874724)                                         

 

             K (test code = 4.4 mmol/L   3.5-5.0                   



             3373525441)                                         

 

             CL (test code = 99 mmol/L                        



             0564605390)                                         

 

             CO2 TOTAL (test code = 25 mmol/L    23-31                     



             3666433454)                                         

 

             AGAP (test code =              2-16                      



             7514139633)                                         

 

             BUN (test code = 19 mg/dL     7-23                      



             8544598271)                                         

 

             GLUCOSE (test code = 103 mg/dL                        



             7212309740)                                         

 

             CREATININE (test code = 0.70 mg/dL   0.60-1.25                 



             3675425408)                                         

 

             TOTAL BILI (test code = 0.7 mg/dL    0.1-1.1                   



             5237472870)                                         

 

             CALCIUM (test code = 9.2 mg/dL    8.6-10.6                  



             8108556202)                                         

 

             T PROTEIN (test code = 9.4 g/dL     6.3-8.2      H            



             0595269228)                                         

 

             ALBUMIN (test code = 4.8 g/dL     3.5-5.0                   



             4144445182)                                         

 

             ALK PHOS (test code = 146 U/L             H            



             9559071995)                                         

 

             ALTv (test code = 75 U/L       5-50         H            



             1742-6)                                             

 

             AST(SGOT) (test code = 37 U/L       13-40                     



             6020145549)                                         

 

             eGFR (test code =              mL/min/1.73m2              



             9851450856)                                         

 

             MAL (test code = MAL) Association of                           



                          Glomerular Filtration                           



                          Rate (GFR) and Staging                           



                          of Kidney Disease*                           



                          +---------------------                           



                          --+-------------------                           



                          --+-------------------                           



                          ------+| GFR                           



                          (mL/min/1.73 m2) ?|                           



                          With Kidney Damage ?|                           



                          ?Without Kidney                           



                          Damage+---------------                           



                          --------+-------------                           



                          --------+-------------                           



                          ------------+| ?>90 ?                           



                          ? ? ? ? ? ? ? ?|                           



                          ?Stage one ? ? ? ? ?|                           



                          ? Normal ? ? ? ? ? ? ?                           



                          ?+--------------------                           



                          ---+------------------                           



                          ---+------------------                           



                          -------+| ?60-89 ? ? ?                           



                          ? ? ? ? ?| ?Stage two                           



                          ? ? ? ? ?| ? Decreased                           



                          GFR ? ? ? ?                            



                          +---------------------                           



                          --+-------------------                           



                          --+-------------------                           



                          ------+| ?30-59 ? ? ?                           



                          ? ? ? ? ?| ?Stage                           



                          three ? ? ? ?| ? Stage                           



                          three ? ? ? ? ?                           



                          +---------------------                           



                          --+-------------------                           



                          --+-------------------                           



                          ------+| ?15-29 ? ? ?                           



                          ? ? ? ? ?| ?Stage four                           



                          ? ? ? ? | ? Stage four                           



                          ? ? ? ? ?                              



                          ?+--------------------                           



                          ---+------------------                           



                          ---+------------------                           



                          -------+| ?<15 (or                           



                          dialysis) ? ?| ?Stage                           



                          five ? ? ? ? | ? Stage                           



                          five ? ? ? ? ?                           



                          ?+--------------------                           



                          ---+------------------                           



                          ---+------------------                           



                          -------+ *Each stage                           



                          assumes the associated                           



                          GFR level has been in                           



                          effect for at least                           



                          three months. ?Stages                           



                          1 to 5, with or                           



                          without kidney                           



                          disease, indicate                           



                          chronic kidney                           



                          disease. Notes:                           



                          Determination of                           



                          stages one and two                           



                          (with eGFR                             



                          >59mL/min/1.73 m2)                           



                          requires estimation of                           



                          kidney damage for at                           



                          least three months as                           



                          defined by structural                           



                          or functional                           



                          abnormalities of the                           



                          kidney, manifested by                           



                          either:Pathological                           



                          abnormalities or                           



                          Markers of kidney                           



                          damage (including                           



                          abnormalities in the                           



                          composition of the                           



                          blood or urine or                           



                          abnormalities in                           



                          imaging tests).                           

 

             Lab Interpretation Abnormal                               



             (test code = 91871-6)                                        



[facility]





             Test Item    Value        Reference Range Interpretation Comments

 

             LIPASE (test code = 7097089091) 86 U/L       0-220                 

    

 

             Lab Interpretation (test code = Normal                             

    



             95810-0)                                            



[id number] 01:55:49





             Test Item    Value        Reference Range Interpretation Comments

 

             WBC (test code =              See_Comment  H             [Automated



             3964-2)                                             message] The sy

stem



                                                                 which generated



                                                                 this result



                                                                 transmitted



                                                                 reference range

:



                                                                 4.20 - 10.70



                                                                 10*3/?L. The



                                                                 reference range

 was



                                                                 not used to



                                                                 interpret this



                                                                 result as



                                                                 normal/abnormal

.

 

             RBC (test code =              See_Comment  H             [Automated



             262-8)                                              message] The sy

stem



                                                                 which generated



                                                                 this result



                                                                 transmitted



                                                                 reference range

:



                                                                 4.26 - 5.52



                                                                 10*6/?L. The



                                                                 reference range

 was



                                                                 not used to



                                                                 interpret this



                                                                 result as



                                                                 normal/abnormal

.

 

             HGB (test code = 18.0 g/dL    12.2-16.4    H            



             718-7)                                              

 

             HCT (test code = 53.9 %       38.4-49.3    H            



             4544-3)                                             

 

             MCV (test code = 87.2 fL      81.7-95.6                 



             787-2)                                              

 

             MCH (test code = 29.1 pg      26.1-32.7                 



             785-6)                                              

 

             MCHC (test code = 33.4 g/dL    31.2-35.0                 



             786-4)                                              

 

             RDW-SD (test code = 48.7 fL      38.5-51.6                 



             96661-1)                                            

 

             RDW-CV (test code = 15.4 %       12.1-15.4                 



             788-0)                                              

 

             PLT (test code =              See_Comment  H             [Automated



             777-3)                                              message] The sy

stem



                                                                 which generated



                                                                 this result



                                                                 transmitted



                                                                 reference range

:



                                                                 150 - 328 10*3/

?L.



                                                                 The reference r

zhen



                                                                 was not used to



                                                                 interpret this



                                                                 result as



                                                                 normal/abnormal

.

 

             MPV (test code = 9.9 fL       9.8-13.0                  



             86122-1)                                            

 

             NRBC/100 WBC (test              See_Comment                [Automat

ed



             code = 3216741809)                                        message] 

The system



                                                                 which generated



                                                                 this result



                                                                 transmitted



                                                                 reference range

:



                                                                 0.0 - 10.0 /100



                                                                 WBCs. The refer

ence



                                                                 range was not u

sed



                                                                 to interpret th

is



                                                                 result as



                                                                 normal/abnormal

.

 

             NRBC x10^3 (test code <0.01        See_Comment                [Auto

mated



             = [id number])                                        message] The s

ystem



                                                                 which generated



                                                                 this result



                                                                 transmitted



                                                                 reference range

:



                                                                 10*3/?L. The



                                                                 reference range

 was



                                                                 not used to



                                                                 interpret this



                                                                 result as



                                                                 normal/abnormal

.

 

             GRAN MAT (NEUT) % 69.8 %                                 



             (test code = 770-8)                                        

 

             IMM GRAN % (test code 0.50 %                                 



             = [id number])                                        

 

             LYMPH % (test code = 20.5 %                                 



             736-9)                                              

 

             MONO % (test code = 6.8 %                                  



             5905-5)                                             

 

             EOS % (test code = 1.9 %                                  



             713-8)                                              

 

             BASO % (test code = 0.5 %                                  



             706-2)                                              

 

             GRAN MAT x10^3(ANC) 7.72 10*3/uL 1.99-6.95    H            



             (test code =                                        



             9742557757)                                         

 

             IMM GRAN x10^3 (test 0.05 10*3/uL 0.00-0.06                 



             code = 4918132760)                                        

 

             LYMPH x10^3 (test code 2.26 10*3/uL 1.09-3.23                 



             = 731-0)                                            

 

             MONO x10^3 (test code 0.75 10*3/uL 0.36-1.02                 



             = 742-7)                                            

 

             EOS x10^3 (test code = 0.21 10*3/uL 0.06-0.53                 



             711-2)                                              

 

             BASO x10^3 (test code 0.06 10*3/uL 0.01-0.09                 



             = 704-7)                                            

 

             Lab Interpretation Abnormal                               



             (test code = 55110-6)                                        



[id number]





             Test Item    Value        Reference    Interpretation Comments



                                       Range                     

 

             D-DIMER (test code =              See_Comment                [Autom

ated



             [id number])                                         message] The



                                                                 system which



                                                                 generated this



                                                                 result



                                                                 transmitted



                                                                 reference range

:



                                                                 <0.41 ?g/mL



                                                                 (FEU). The



                                                                 reference range



                                                                 was not used to



                                                                 interpret this



                                                                 result as



                                                                 normal/abnormal

.

 

             MAL (test code = This test may be                           



             MAL)         used in conjunction                           



                          with a clinical                           



                          pretest probability                           



                          (PTP) assessment                           



                          model to exclude                           



                          venous                                 



                          thromboembolism                           



                          (VTE) in patients                           



                          suspected of deep                           



                          venous thrombosis                           



                          (DVT) and pulmonary                           



                          embolism (PE)  A                           



                          D-Dimer value less                           



                          than 0.50 ?g/ml                           



                          (FEU) has a negative                           



                          predicative value of                           



                          96 to 100% (95%                           



                          CI)and 97 to 100%                           



                          (95% CI) as an aid                           



                          in the diagnosis of                           



                          deep vein thrombosis                           



                          (DVT) and pulmonary                           



                          embolism when there                           



                          is low or moderate                           



                          pretest probability                           



                          of PE or DVT.                           



                          D-Dimer values are                           



                          expressed in initial                           



                          fibrinogen                             



                          equivalent units                           



                          (FEU)" The assay                           



                          results should be                           



                          used with other                           



                          information,                           



                          including the                           



                          clinical context, in                           



                          forming a diagnosis.                           



                                                                 

 

             Lab Interpretation Normal                                 



             (test code =                                        



             91816-5)                                            



[id number] 
22:42:48





             Test Item    Value        Reference Range Interpretation Comments

 

             NA (test code = 135 mmol/L   135-145                   



             9016883855)                                         

 

             K (test code = 4.6 mmol/L   3.5-5.0                   



             7211526168)                                         

 

             CL (test code = 102 mmol/L                       



             6799680069)                                         

 

             CO2 TOTAL (test code = 24 mmol/L    23-31                     



             2408999365)                                         

 

             AGAP (test code =              2-16                      



             4135054101)                                         

 

             BUN (test code = 17 mg/dL     7-23                      



             8030557630)                                         

 

             GLUCOSE (test code = 107 mg/dL                        



             4551055280)                                         

 

             CREATININE (test code = 0.60 mg/dL   0.60-1.25                 



             8858625441)                                         

 

             TOTAL BILI (test code = 1.0 mg/dL    0.1-1.1                   



             4497644688)                                         

 

             CALCIUM (test code = 9.4 mg/dL    8.6-10.6                  



             3770544409)                                         

 

             T PROTEIN (test code = 8.6 g/dL     6.3-8.2      H            



             7470766722)                                         

 

             ALBUMIN (test code = 4.6 g/dL     3.5-5.0                   



             4709688254)                                         

 

             ALK PHOS (test code = 159 U/L             H            



             1545848270)                                         

 

             ALTv (test code = 77 U/L       5-50         H            



             1742-6)                                             

 

             AST(SGOT) (test code = 38 U/L       13-40                     



             8543662428)                                         

 

             eGFR (test code =              mL/min/1.73m2              



             6870011788)                                         

 

             MAL (test code = MAL) Association of                           



                          Glomerular Filtration                           



                          Rate (GFR) and Staging                           



                          of Kidney Disease*                           



                          +---------------------                           



                          --+-------------------                           



                          --+-------------------                           



                          ------+| GFR                           



                          (mL/min/1.73 m2) ?|                           



                          With Kidney Damage ?|                           



                          ?Without Kidney                           



                          Damage+---------------                           



                          --------+-------------                           



                          --------+-------------                           



                          ------------+| ?>90 ?                           



                          ? ? ? ? ? ? ? ?|                           



                          ?Stage one ? ? ? ? ?|                           



                          ? Normal ? ? ? ? ? ? ?                           



                          ?+--------------------                           



                          ---+------------------                           



                          ---+------------------                           



                          -------+| ?60-89 ? ? ?                           



                          ? ? ? ? ?| ?Stage two                           



                          ? ? ? ? ?| ? Decreased                           



                          GFR ? ? ? ?                            



                          +---------------------                           



                          --+-------------------                           



                          --+-------------------                           



                          ------+| ?30-59 ? ? ?                           



                          ? ? ? ? ?| ?Stage                           



                          three ? ? ? ?| ? Stage                           



                          three ? ? ? ? ?                           



                          +---------------------                           



                          --+-------------------                           



                          --+-------------------                           



                          ------+| ?15-29 ? ? ?                           



                          ? ? ? ? ?| ?Stage four                           



                          ? ? ? ? | ? Stage four                           



                          ? ? ? ? ?                              



                          ?+--------------------                           



                          ---+------------------                           



                          ---+------------------                           



                          -------+| ?<15 (or                           



                          dialysis) ? ?| ?Stage                           



                          five ? ? ? ? | ? Stage                           



                          five ? ? ? ? ?                           



                          ?+--------------------                           



                          ---+------------------                           



                          ---+------------------                           



                          -------+ *Each stage                           



                          assumes the associated                           



                          GFR level has been in                           



                          effect for at least                           



                          three months. ?Stages                           



                          1 to 5, with or                           



                          without kidney                           



                          disease, indicate                           



                          chronic kidney                           



                          disease. Notes:                           



                          Determination of                           



                          stages one and two                           



                          (with eGFR                             



                          >59mL/min/1.73 m2)                           



                          requires estimation of                           



                          kidney damage for at                           



                          least three months as                           



                          defined by structural                           



                          or functional                           



                          abnormalities of the                           



                          kidney, manifested by                           



                          either:Pathological                           



                          abnormalities or                           



                          Markers of kidney                           



                          damage (including                           



                          abnormalities in the                           



                          composition of the                           



                          blood or urine or                           



                          abnormalities in                           



                          imaging tests).                           

 

             Lab Interpretation Abnormal                               



             (test code = 92136-3)                                        



[facility] WITH DIFF2022-01-15 22:30:27





             Test Item    Value        Reference Range Interpretation Comments

 

             WBC (test code =              See_Comment  H             [Automated



             6690-2)                                             message] The



                                                                 system which



                                                                 generated this



                                                                 result transmit

huma



                                                                 reference range

:



                                                                 4.20 - 10.70



                                                                 10*3/?L. The



                                                                 reference range



                                                                 was not used to



                                                                 interpret this



                                                                 result as



                                                                 normal/abnormal

.

 

             RBC (test code =              See_Comment  H             [Automated



             789-8)                                              message] The



                                                                 system which



                                                                 generated this



                                                                 result transmit

huma



                                                                 reference range

:



                                                                 4.26 - 5.52



                                                                 10*6/?L. The



                                                                 reference range



                                                                 was not used to



                                                                 interpret this



                                                                 result as



                                                                 normal/abnormal

.

 

             HGB (test code = 17.5 g/dL    12.2-16.4    H            



             718-7)                                              

 

             HCT (test code = 51.0 %       38.4-49.3    H            



             4544-3)                                             

 

             MCV (test code = 86.7 fL      81.7-95.6                 



             787-2)                                              

 

             MCH (test code = 29.8 pg      26.1-32.7                 



             785-6)                                              

 

             MCHC (test code = 34.3 g/dL    31.2-35.0                 



             786-4)                                              

 

             RDW-SD (test code = 48.0 fL      38.5-51.6                 



             19158-7)                                            

 

             RDW-CV (test code = 15.1 %       12.1-15.4                 



             788-0)                                              

 

             PLT (test code =              See_Comment  H             [Automated



             777-3)                                              message] The



                                                                 system which



                                                                 generated this



                                                                 result transmit

huma



                                                                 reference range

:



                                                                 150 - 328 10*3/

?L.



                                                                 The reference



                                                                 range was not u

sed



                                                                 to interpret th

is



                                                                 result as



                                                                 normal/abnormal

.

 

             MPV (test code = 10.3 fL      9.8-13.0                  



             15443-6)                                            

 

             NRBC/100 WBC (test              See_Comment                [Automat

ed



             code = [id number])                                        message] 

The



                                                                 system which



                                                                 generated this



                                                                 result transmit

huma



                                                                 reference range

:



                                                                 0.0 - 10.0 /100



                                                                 WBCs. The



                                                                 reference range



                                                                 was not used to



                                                                 interpret this



                                                                 result as



                                                                 normal/abnormal

.

 

             NRBC x10^3 (test code <0.01        See_Comment                [Auto

mated



             = [id number])                                        message] The



                                                                 system which



                                                                 generated this



                                                                 result transmit

huma



                                                                 reference range

:



                                                                 10*3/?L. The



                                                                 reference range



                                                                 was not used to



                                                                 interpret this



                                                                 result as



                                                                 normal/abnormal

.

 

             GRAN MAT (NEUT) % 79.9 %                                 



             (test code = 770-8)                                        

 

             IMM GRAN % (test code 0.50 %                                 



             = [id number])                                        

 

             LYMPH % (test code = 11.8 %                                 



             736-9)                                              

 

             MONO % (test code = 6.6 %                                  



             5905-5)                                             

 

             EOS % (test code = 0.9 %                                  



             713-8)                                              

 

             BASO % (test code = 0.3 %                                  



             706-2)                                              

 

             GRAN MAT x10^3(ANC) 10.70 10*3/uL 1.99-6.95    H            



             (test code =                                        



             1019098496)                                         

 

             IMM GRAN x10^3 (test 0.07 10*3/uL 0.00-0.06    H            



             code = [id number])                                        

 

             LYMPH x10^3 (test code 1.58 10*3/uL 1.09-3.23                 



             = 731-0)                                            

 

             MONO x10^3 (test code 0.89 10*3/uL 0.36-1.02                 



             = 742-7)                                            

 

             EOS x10^3 (test code = 0.12 10*3/uL 0.06-0.53                 



             711-2)                                              

 

             BASO x10^3 (test code 0.04 10*3/uL 0.01-0.09                 



             = 704-7)                                            

 

             Lab Interpretation Abnormal                               



             (test code = 02009-8)                                        



[id number] 
[facility]15





             Test Item    Value        Reference Range Interpretation Comments

 

             NA (test code = 137 mmol/L   135-145                   



             4177109574)                                         

 

             K (test code = 4.5 mmol/L   3.5-5.0                   



             6966959594)                                         

 

             CL (test code = 109 mmol/L          H            



             8570875945)                                         

 

             CO2 TOTAL (test code = 22 mmol/L    23-31        L            



             8831362291)                                         

 

             AGAP (test code =              2-16                      



             4713883518)                                         

 

             BUN (test code = 7 mg/dL      7-23                      



             6846828338)                                         

 

             GLUCOSE (test code = 87 mg/dL                         



             2315890849)                                         

 

             CREATININE (test code = 0.61 mg/dL   0.60-1.25                 



             3672610416)                                         

 

             TOTAL BILI (test code = 0.5 mg/dL    0.1-1.1                   



             7733163701)                                         

 

             CALCIUM (test code = 9.0 mg/dL    8.6-10.6                  



             0079150918)                                         

 

             T PROTEIN (test code = 6.9 g/dL     6.3-8.2                   



             4884772131)                                         

 

             ALBUMIN (test code = 3.5 g/dL     3.5-5.0                   



             8157140023)                                         

 

             ALK PHOS (test code = 112 U/L                          



             7589278436)                                         

 

             ALTv (test code = 35 U/L       5-50                      



             1742-6)                                             

 

             AST(SGOT) (test code = 21 U/L       13-40                     



             3668211038)                                         

 

             eGFR (test code =              mL/min/1.73m2              



             9168576615)                                         

 

             MAL (test code = MAL) Association of                           



                          Glomerular Filtration                           



                          Rate (GFR) and Staging                           



                          of Kidney Disease*                           



                          +---------------------                           



                          --+-------------------                           



                          --+-------------------                           



                          ------+| GFR                           



                          (mL/min/1.73 m2) ?|                           



                          With Kidney Damage ?|                           



                          ?Without Kidney                           



                          Damage+---------------                           



                          --------+-------------                           



                          --------+-------------                           



                          ------------+| ?>90 ?                           



                          ? ? ? ? ? ? ? ?|                           



                          ?Stage one ? ? ? ? ?|                           



                          ? Normal ? ? ? ? ? ? ?                           



                          ?+--------------------                           



                          ---+------------------                           



                          ---+------------------                           



                          -------+| ?60-89 ? ? ?                           



                          ? ? ? ? ?| ?Stage two                           



                          ? ? ? ? ?| ? Decreased                           



                          GFR ? ? ? ?                            



                          +---------------------                           



                          --+-------------------                           



                          --+-------------------                           



                          ------+| ?30-59 ? ? ?                           



                          ? ? ? ? ?| ?Stage                           



                          three ? ? ? ?| ? Stage                           



                          three ? ? ? ? ?                           



                          +---------------------                           



                          --+-------------------                           



                          --+-------------------                           



                          ------+| ?15-29 ? ? ?                           



                          ? ? ? ? ?| ?Stage four                           



                          ? ? ? ? | ? Stage four                           



                          ? ? ? ? ?                              



                          ?+--------------------                           



                          ---+------------------                           



                          ---+------------------                           



                          -------+| ?<15 (or                           



                          dialysis) ? ?| ?Stage                           



                          five ? ? ? ? | ? Stage                           



                          five ? ? ? ? ?                           



                          ?+--------------------                           



                          ---+------------------                           



                          ---+------------------                           



                          -------+ *Each stage                           



                          assumes the associated                           



                          GFR level has been in                           



                          effect for at least                           



                          three months. ?Stages                           



                          1 to 5, with or                           



                          without kidney                           



                          disease, indicate                           



                          chronic kidney                           



                          disease. Notes:                           



                          Determination of                           



                          stages one and two                           



                          (with eGFR                             



                          >59mL/min/1.73 m2)                           



                          requires estimation of                           



                          kidney damage for at                           



                          least three months as                           



                          defined by structural                           



                          or functional                           



                          abnormalities of the                           



                          kidney, manifested by                           



                          either:Pathological                           



                          abnormalities or                           



                          Markers of kidney                           



                          damage (including                           



                          abnormalities in the                           



                          composition of the                           



                          blood or urine or                           



                          abnormalities in                           



                          imaging tests).                           

 

             Lab Interpretation Abnormal                               



             (test code = 01068-2)                                        



[facility] WITH [id number] 22:51:57





             Test Item    Value        Reference Range Interpretation Comments

 

             WBC (test code =              See_Comment  H             [Automated



             6690-2)                                             message] The sy

stem



                                                                 which generated



                                                                 this result



                                                                 transmitted



                                                                 reference range

:



                                                                 4.20 - 10.70



                                                                 10*3/?L. The



                                                                 reference range

 was



                                                                 not used to



                                                                 interpret this



                                                                 result as



                                                                 normal/abnormal

.

 

             RBC (test code =              See_Comment                [Automated



             789-8)                                              message] The sy

stem



                                                                 which generated



                                                                 this result



                                                                 transmitted



                                                                 reference range

:



                                                                 4.26 - 5.52



                                                                 10*6/?L. The



                                                                 reference range

 was



                                                                 not used to



                                                                 interpret this



                                                                 result as



                                                                 normal/abnormal

.

 

             HGB (test code = 14.7 g/dL    12.2-16.4                 



             718-7)                                              

 

             HCT (test code = 43.7 %       38.4-49.3                 



             4544-3)                                             

 

             MCV (test code = 85.9 fL      81.7-95.6                 



             787-2)                                              

 

             MCH (test code = 28.9 pg      26.1-32.7                 



             785-6)                                              

 

             MCHC (test code = 33.6 g/dL    31.2-35.0                 



             786-4)                                              

 

             RDW-SD (test code = 42.4 fL      38.5-51.6                 



             19445-9)                                            

 

             RDW-CV (test code = 13.6 %       12.1-15.4                 



             788-0)                                              

 

             PLT (test code =              See_Comment  H             [Automated



             777-3)                                              message] The sy

stem



                                                                 which generated



                                                                 this result



                                                                 transmitted



                                                                 reference range

:



                                                                 150 - 328 10*3/

?L.



                                                                 The reference r

zhen



                                                                 was not used to



                                                                 interpret this



                                                                 result as



                                                                 normal/abnormal

.

 

             MPV (test code = 9.4 fL       9.8-13.0     L            



             17455-4)                                            

 

             NRBC/100 WBC (test              See_Comment                [Automat

ed



             code = 6857136974)                                        message] 

The system



                                                                 which generated



                                                                 this result



                                                                 transmitted



                                                                 reference range

:



                                                                 0.0 - 10.0 /100



                                                                 WBCs. The refer

ence



                                                                 range was not u

sed



                                                                 to interpret th

is



                                                                 result as



                                                                 normal/abnormal

.

 

             NRBC x10^3 (test code <0.01        See_Comment                [Auto

mated



             = 2635194564)                                        message] The s

ystem



                                                                 which generated



                                                                 this result



                                                                 transmitted



                                                                 reference range

:



                                                                 10*3/?L. The



                                                                 reference range

 was



                                                                 not used to



                                                                 interpret this



                                                                 result as



                                                                 normal/abnormal

.

 

             GRAN MAT (NEUT) % 76.4 %                                 



             (test code = 770-8)                                        

 

             IMM GRAN % (test code 0.40 %                                 



             = 1073521689)                                        

 

             LYMPH % (test code = 16.3 %                                 



             736-9)                                              

 

             MONO % (test code = 5.6 %                                  



             5905-5)                                             

 

             EOS % (test code = 1.0 %                                  



             713-8)                                              

 

             BASO % (test code = 0.3 %                                  



             706-2)                                              

 

             GRAN MAT x10^3(ANC) 8.81 10*3/uL 1.99-6.95    H            



             (test code =                                        



             7477967673)                                         

 

             IMM GRAN x10^3 (test 0.05 10*3/uL 0.00-0.06                 



             code = 6649746487)                                        

 

             LYMPH x10^3 (test code 1.88 10*3/uL 1.09-3.23                 



             = 731-0)                                            

 

             MONO x10^3 (test code 0.64 10*3/uL 0.36-1.02                 



             = 742-7)                                            

 

             EOS x10^3 (test code = 0.12 10*3/uL 0.06-0.53                 



             711-2)                                              

 

             BASO x10^3 (test code 0.03 10*3/uL 0.01-0.09                 



             = 704-7)                                            

 

             Lab Interpretation Abnormal                               



             (test code = 67647-7)                                        



[facility] 
[facility]:01:33





             Test Item    Value        Reference Range Interpretation Comments

 

             SARS-CoV-2 Rapid ID NOW Not Detected Not Detected              



             (test code = 16959-4)                                        

 

             MAL (test code = MAL) ID NOW COVID-19 Assay                        

   



                          is an isothermal                           



                          nucleic acid                           



                          amplification test                           



                          intended for the                           



                          qualitative detection                           



                          of nucleic acid from                           



                          SARS-CoV-2 viral RNA                           



                          in nasopharyngeal (NP)                           



                          specimens. It is used                           



                          under Emergency Use                           



                          Authorization (EUA) by                           



                          FDA. The limit of                           



                          detection (LOD) of the                           



                          assay is 125 Genome                           



                          Equivalents/mL. A                           



                          positive result is                           



                          indicative of the                           



                          presence of SARS-CoV-2                           



                          RNA. ?Clinical                           



                          correlation with                           



                          patient history and                           



                          other diagnostic                           



                          information is                           



                          necessary to determine                           



                          patient infection                           



                          status. A negative                           



                          (Not Detected) result                           



                          does not preclude                           



                          SARS-CoV-2 infection.                           



                          In patients with                           



                          clinical symptoms and                           



                          other tests that are                           



                          consistent with                           



                          SARS-CoV-2 infection,                           



                          negative results                           



                          should be treated as                           



                          presumptive negative                           



                          and a new specimen                           



                          should be tested with                           



                          alternative PCR                           



                          molecular test.                           



                          Invalid: Please                           



                          collect a new specimen                           



                          for repeat patient                           



                          testing if clinically                           



                          indicated.                             

 

             Lab Interpretation Normal                                 



             (test code = 03729-5)                                        



[facility]. METABOLIC PANEL ([id number])2021-05-10 
01:00:33





             Test Item    Value        Reference Range Interpretation Comments

 

             NA (test code = 140 mmol/L   135-145                   



             7374837725)                                         

 

             K (test code = 4.4 mmol/L   3.5-5.0                   



             9246471800)                                         

 

             CL (test code = 103 mmol/L                       



             3603504378)                                         

 

             CO2 TOTAL (test code = 28 mmol/L    23-31                     



             4372831221)                                         

 

             AGAP (test code =              2-16                      



             7946534413)                                         

 

             BUN (test code = 15 mg/dL     7-23                      



             9886503428)                                         

 

             GLUCOSE (test code = 85 mg/dL                         



             3210362294)                                         

 

             CREATININE (test code = 0.60 mg/dL   0.60-1.25                 



             3446311937)                                         

 

             TOTAL BILI (test code = 1.1 mg/dL    0.1-1.1                   



             6154136724)                                         

 

             CALCIUM (test code = 9.0 mg/dL    8.6-10.6                  



             5107946181)                                         

 

             T PROTEIN (test code = 7.8 g/dL     6.3-8.2                   



             7068615140)                                         

 

             ALBUMIN (test code = 4.0 g/dL     3.5-5.0                   



             3369418990)                                         

 

             ALK PHOS (test code = 166 U/L             H            



             9258116359)                                         

 

             ALTv (test code = 42 U/L       5-50                      



             1742-6)                                             

 

             AST(SGOT) (test code = 32 U/L       13-40                     



             8829332879)                                         

 

             eGFR (test code =              mL/min/1.73m2              



             0813532096)                                         

 

             MAL (test code = MAL) Association of                           



                          Glomerular Filtration                           



                          Rate (GFR) and Staging                           



                          of Kidney Disease*                           



                          +---------------------                           



                          --+-------------------                           



                          --+-------------------                           



                          ------+| GFR                           



                          (mL/min/1.73 m2) ?|                           



                          With Kidney Damage ?|                           



                          ?Without Kidney                           



                          Damage+---------------                           



                          --------+-------------                           



                          --------+-------------                           



                          ------------+| ?>90 ?                           



                          ? ? ? ? ? ? ? ?|                           



                          ?Stage one ? ? ? ? ?|                           



                          ? Normal ? ? ? ? ? ? ?                           



                          ?+--------------------                           



                          ---+------------------                           



                          ---+------------------                           



                          -------+| ?60-89 ? ? ?                           



                          ? ? ? ? ?| ?Stage two                           



                          ? ? ? ? ?| ? Decreased                           



                          GFR ? ? ? ?                            



                          +---------------------                           



                          --+-------------------                           



                          --+-------------------                           



                          ------+| ?30-59 ? ? ?                           



                          ? ? ? ? ?| ?Stage                           



                          three ? ? ? ?| ? Stage                           



                          three ? ? ? ? ?                           



                          +---------------------                           



                          --+-------------------                           



                          --+-------------------                           



                          ------+| ?15-29 ? ? ?                           



                          ? ? ? ? ?| ?Stage four                           



                          ? ? ? ? | ? Stage four                           



                          ? ? ? ? ?                              



                          ?+--------------------                           



                          ---+------------------                           



                          ---+------------------                           



                          -------+| ?<15 (or                           



                          dialysis) ? ?| ?Stage                           



                          five ? ? ? ? | ? Stage                           



                          five ? ? ? ? ?                           



                          ?+--------------------                           



                          ---+------------------                           



                          ---+------------------                           



                          -------+ *Each stage                           



                          assumes the associated                           



                          GFR level has been in                           



                          effect for at least                           



                          three months. ?Stages                           



                          1 to 5, with or                           



                          without kidney                           



                          disease, indicate                           



                          chronic kidney                           



                          disease. Notes:                           



                          Determination of                           



                          stages one and two                           



                          (with eGFR                             



                          >59mL/min/1.73 m2)                           



                          requires estimation of                           



                          kidney damage for at                           



                          least three months as                           



                          defined by structural                           



                          or functional                           



                          abnormalities of the                           



                          kidney, manifested by                           



                          either:Pathological                           



                          abnormalities or                           



                          Markers of kidney                           



                          damage (including                           



                          abnormalities in the                           



                          composition of the                           



                          blood or urine or                           



                          abnormalities in                           



                          imaging tests).                           

 

             Lab Interpretation Abnormal                               



             (test code = 21404-0)                                        



[facility]





             Test Item    Value        Reference Range Interpretation Comments

 

             LIPASE (test code = 5357159120) 57 U/L       0-220                 

    

 

             Lab Interpretation (test code = Normal                             

    



             00843-0)                                            



[facility]





             Test Item    Value        Reference Range Interpretation Comments

 

             APPEARANCE (test code = Turbid       Clear        A            



             1670429471)                                         

 

             COLOR (test code = Yellow       Yellow                    



             8814599323)                                         

 

             PH (test code =              4.8-8.0                   



             4610141375)                                         

 

             SP GRAVITY (test code =              1.003-1.030               



             2479008124)                                         

 

             GLU U QUAL (test code = Normal       Normal                    



             9500720753)                                         

 

             BLOOD (test code = 1+           Negative     A            



             1613566150)                                         

 

             KETONES (test code = 20 mg/dL     Negative     A            



             8386481154)                                         

 

             PROTEIN (test code = 100 mg/dL    Negative     A            



             2887-8)                                             

 

             UROBILIN (test code = 2.0 mg/dL    Normal       A            



             5146332538)                                         

 

             BILIRUBIN (test code = Negative     Negative                  



             8976350866)                                         

 

             NITRITE (test code = Positive     Negative     A            



             5459292950)                                         

 

             LEUK FRANCE (test code = 250/uL       Negative     A            



             5410207205)                                         

 

             RBC/HPF (test code =              See_Comment  H             [Autom

ated message]



             8439314866)                                         The system [employer]



                                                                 generated this



                                                                 result transmit

huma



                                                                 reference range

: 0 -



                                                                 3 HPF. The refe

rence



                                                                 range was not u

sed



                                                                 to interpret th

is



                                                                 result as



                                                                 normal/abnormal

.

 

             WBC/HPF (test code = >182         See_Comment  H             [Autom

ated message]



             5007525436)                                         The system [employer]



                                                                 generated this



                                                                 result transmit

huma



                                                                 reference range

: 0 -



                                                                 5 HPF. The refe

rence



                                                                 range was not u

sed



                                                                 to interpret th

is



                                                                 result as



                                                                 normal/abnormal

.

 

             BACTERIA (test code = Many         Negative     A            



             7423286676)                                         

 

             MUCOUS (test code = Moderate     Negative LPF A            



             2805213693)                                         

 

             WBC CLUMPS (test code =              See_Comment  H             [Au

tomated message]



             4028752140)                                         The system [employer]



                                                                 generated this



                                                                 result transmit

huma



                                                                 reference range

: <=1



                                                                 HPF. The refere

nce



                                                                 range was not u

sed



                                                                 to interpret th

is



                                                                 result as



                                                                 normal/abnormal

.

 

             Lab Interpretation (test Abnormal                               



             code = 65317-8)                                        



[facility] WITH DIFF2021-05-10 00:46:14





             Test Item    Value        Reference Range Interpretation Comments

 

             WBC (test code =              See_Comment                [Automated



             6690-2)                                             message] The sy

stem



                                                                 which generated



                                                                 this result



                                                                 transmitted



                                                                 reference range

:



                                                                 4.20 - 10.70



                                                                 10*3/?L. The



                                                                 reference range

 was



                                                                 not used to



                                                                 interpret this



                                                                 result as



                                                                 normal/abnormal

.

 

             RBC (test code =              See_Comment                [Automated



             789-8)                                              message] The sy

stem



                                                                 which generated



                                                                 this result



                                                                 transmitted



                                                                 reference range

:



                                                                 4.26 - 5.52



                                                                 10*6/?L. The



                                                                 reference range

 was



                                                                 not used to



                                                                 interpret this



                                                                 result as



                                                                 normal/abnormal

.

 

             HGB (test code = 15.9 g/dL    12.2-16.4                 



             718-7)                                              

 

             HCT (test code = 47.1 %       38.4-49.3                 



             4544-3)                                             

 

             MCV (test code = 86.6 fL      81.7-95.6                 



             787-2)                                              

 

             MCH (test code = 29.2 pg      26.1-32.7                 



             785-6)                                              

 

             MCHC (test code = 33.8 g/dL    31.2-35.0                 



             786-4)                                              

 

             RDW-SD (test code = 47.6 fL      38.5-51.6                 



             08690-8)                                            

 

             RDW-CV (test code = 15.2 %       12.1-15.4                 



             788-0)                                              

 

             PLT (test code =              See_Comment  H             [Automated



             777-3)                                              message] The sy

stem



                                                                 which generated



                                                                 this result



                                                                 transmitted



                                                                 reference range

:



                                                                 150 - 328 10*3/

?L.



                                                                 The reference r

zhen



                                                                 was not used to



                                                                 interpret this



                                                                 result as



                                                                 normal/abnormal

.

 

             MPV (test code = 9.4 fL       9.8-13.0     L            



             49028-8)                                            

 

             NRBC/100 WBC (test              See_Comment                [Automat

ed



             code = 6766971623)                                        message] 

The system



                                                                 which generated



                                                                 this result



                                                                 transmitted



                                                                 reference range

:



                                                                 0.0 - 10.0 /100



                                                                 WBCs. The refer

ence



                                                                 range was not u

sed



                                                                 to interpret th

is



                                                                 result as



                                                                 normal/abnormal

.

 

             NRBC x10^3 (test code <0.01        See_Comment                [Auto

mated



             = 3618065803)                                        message] The s

ystem



                                                                 which generated



                                                                 this result



                                                                 transmitted



                                                                 reference range

:



                                                                 10*3/?L. The



                                                                 reference range

 was



                                                                 not used to



                                                                 interpret this



                                                                 result as



                                                                 normal/abnormal

.

 

             GRAN MAT (NEUT) % 61.3 %                                 



             (test code = 770-8)                                        

 

             IMM GRAN % (test code 0.70 %                                 



             = 6997307490)                                        

 

             LYMPH % (test code = 26.4 %                                 



             736-9)                                              

 

             MONO % (test code = 9.9 %                                  



             5905-5)                                             

 

             EOS % (test code = 1.3 %                                  



             713-8)                                              

 

             BASO % (test code = 0.4 %                                  



             706-2)                                              

 

             GRAN MAT x10^3(ANC) 6.39 10*3/uL 1.99-6.95                 



             (test code =                                        



             5760724943)                                         

 

             IMM GRAN x10^3 (test 0.07 10*3/uL 0.00-0.06    H            



             code = 1409094765)                                        

 

             LYMPH x10^3 (test code 2.75 10*3/uL 1.09-3.23                 



             = 731-0)                                            

 

             MONO x10^3 (test code 1.03 10*3/uL 0.36-1.02    H            



             = 742-7)                                            

 

             EOS x10^3 (test code = 0.14 10*3/uL 0.06-0.53                 



             711-2)                                              

 

             BASO x10^3 (test code 0.04 10*3/uL 0.01-0.09                 



             = 704-7)                                            

 

             Lab Interpretation Abnormal                               



             (test code = 71617-9)                                        



[id number] [facility]03:00





             Test Item    Value        Reference Range Interpretation Comments

 

             POC-GLUCOSE METER 140 mg/dL           H            : TESTED A

T [facility] [address]



             (BEAKER) (test code =                                        [facility],



             1538)                                               [address]:



                                                                 /Techni

christina ID



                                                                 = [id number] for ANATN CATES



POCT-GLUCOSE [id number] 09:15:00





             Test Item    Value        Reference Range Interpretation Comments

 

             POC-GLUCOSE METER 142 mg/dL           H            : TESTED A

T [facility] [address]



             (BEAKER) (test code =                                        [facility],



             1538)                                               [address]:



                                                                 /Techni

christina ID



                                                                 = [id number] for ANANT CATES



POCT-GLUCOSE METER2020-01-15 20:56:00





             Test Item    Value        Reference Range Interpretation Comments

 

             POC-GLUCOSE METER 134 mg/dL           H            : TESTED A

T [facility] [address]



             (BEAKER) (test code =                                        [facility],



             [facility]8)                                               [address]:



                                                                 /Techni

christina ID



                                                                 = [id number] for SHERRILL GUARDADO



POCT-GLUCOSE METER2020-01-15 16:46:00





             Test Item    Value        Reference Range Interpretation Comments

 

             POC-GLUCOSE METER 91 mg/dL                         : TESTED A

T [facility] [address]



             (BEAKER) (test code =                                        [facility],



             1538)                                               [address]:



                                                                 /Techni

christina ID =



                                                                 [id number] for ANANT HAMILTON



POCT-GLUCOSE METER2020-01-15 12:19:00





             Test Item    Value        Reference Range Interpretation Comments

 

             POC-GLUCOSE METER 237 mg/dL           H            : TESTED A

T [facility] [address]



             (BEAKER) (test code =                                        [facility],



             [facility]8)                                               [address]:



                                                                 /Techni

christina ID



                                                                 = [id number] for ANANT CATES



MR, EXTREMITY, LOWER, WITHOUT CONTRAST, RIGHT2020-01-15 09:12:00FINAL REPORT 
PATIENT ID:   [id number] MRI of the right and left hindfoot without intravenous 
contrast History: Osteomyelitis, diabetic foot Comparison: Radiograph dated 
2020 Technique: Multiplanar multisequence MRI of the right and left 
hindfoot was performed without intravenous contrast using the hindfoot 
osteomyelitis protocol Findings: Right foot: Marked T1 and T2 hypointense soft 
tissue thickening is noted at the medial aspect of the right heel, likely to 
reflect scar tissue. There is also mild subcutaneous edema at the lateral aspect
of the mid foot and forefoot, incompletely imaged. No discrete drainable fluid 
collection is identified. There is no marrow signal abnormality to suggest 
osteomyelitis. There is a small nonspecific joint effusion at the ankle and 
subtalar joint. Scattered degenerative changes are noted. There is marked fatty 
atrophy of the distal leg and foot musculature. Severe flexor hallucis longus 
tendinopathy. No tenosynovitis. Left foot: There is a large area ofsoft tissue 
defect and granulation tissue at the posteromedial aspect of the heel, reaching 
the calcaneus, but there is no drainable fluid collection. Deformity is noted in
the hindfoot, and the forefoot appears adducted. No acute fracture. No marrow 
signal abnormality is identified to indicate acute osteomyelitis. There is no 
significant joint effusion. There is severe atrophy of the foot and distalleg 
musculature. No significant tenosynovitis identified. IMPRESSION: 
Granulation/scar tissue at themedial aspect of the heel bilaterally. No MR 
evidence of osteomyelitis. Severe muscle atrophy. Signed: Jorge Cornejo 
Verified Date/Time:  01/15/2020 09:12:01 Reading Location: [address] Ortho 
Consult Reading Room        Electronically signed by: JORGE CORNEJO M.D. on 
01/15/2020 09:12 AMMR, EXTREMITY, LOWER, WITHOUT CONTRAST, LEFT2020-01-15 
09:12:00FINAL REPORT PATIENT ID:   [id number] MRI of the right and left hindfoot 
without intravenous contrast History: Osteomyelitis, diabetic foot Comparison: 
Radiograph dated 2020 Technique: Multiplanar multisequence MRI of the
right and left hindfoot was performed without intravenous contrast using the 
hindfoot osteomyelitis protocol Findings: Right foot: Marked T1 and T2 
hypointense soft tissue thickening is noted at the medial aspect of the right 
heel, likely to reflect scar tissue. There is also mild subcutaneous edema at 
the lateral aspect of the mid foot and forefoot, incompletely imaged. No 
discrete drainable fluid collection is identified. There is no marrow signal 
abnormality to suggest osteomyelitis. There is a small nonspecific joint 
effusion at the ankle and subtalar joint. Scattered degenerative changes are 
noted. There is marked fatty atrophy of the distal leg and foot musculature. 
Severe flexor hallucis longus tendinopathy. No tenosynovitis. Left foot: There 
is a large area ofsoft tissue defect and granulation tissue at the posteromedial
aspect of the heel, reaching the calcaneus, but there is no drainable fluid 
collection. Deformity is noted in the hindfoot, and the forefoot appears 
adducted. No acute fracture. No marrow signal abnormality is identified to 
indicate acute osteomyelitis. There is no significant joint effusion. There is 
severe atrophy of the foot and distalleg musculature. No significant 
tenosynovitis identified. IMPRESSION: Granulation/scar tissue at themedial 
aspect of the heel bilaterally. No MR evidence of osteomyelitis. Severe muscle 
atrophy. Signed: Jorge Cornejoort Verified Date/Time:  01/15/2020 09:12:01 
Reading Location: [facility] C013X Ortho Consult Reading Room        Electronically 
signed by: JORGE CORNEJO M.D. on 01/15/2020 09:12 AMPOCT-GLUCOSE METER2020-01-15 
08:47:00





             Test Item    Value        Reference Range Interpretation Comments

 

             POC-GLUCOSE METER 264 mg/dL           H            : TESTED A

T [facility] [address]



             (BEAKER) (test code =                                        [facility],



             1538)                                               [address]:



                                                                 /Techni

christina ID



                                                                 = [id number] for ANANT CATES



ISLET CELL AB SCR2020-01-15 07:43:00





             Test Item    Value        Reference Range Interpretation Comments

 

             ISLET CELL AB Refer to individual                           



             AUTOVERIFICATION (test Islet Cell Ab                           



             code = 2556) and/or Islet Cell                           



                          Ab Titer results.                           



POCT-GLUCOSE [id number] 21:27:00





             Test Item    Value        Reference Range Interpretation Comments

 

             POC-GLUCOSE METER 130 mg/dL           H            : TESTED A

T [facility] [address]



             (BEAKER) (test code =                                        [facility],



             1538)                                               [address]:



                                                                 /Techni

christina ID



                                                                 = [id number] for KRANTHI PIERRE



BLOOD [id number] 13:00:00





             Test Item    Value        Reference Range Interpretation Comments

 

             CULTURE (BEAKER) (test No growth in 5 days                         

  



             code = 1095)                                        



BLOOD [id number] 13:00:00





             Test Item    Value        Reference Range Interpretation Comments

 

             CULTURE (BEAKER) (test No growth in 5 days                         

  



             code = 1095)                                        



POCT-GLUCOSE [id number] 12:57:00





             Test Item    Value        Reference Range Interpretation Comments

 

             POC-GLUCOSE METER 138 mg/dL           H            : TESTED A

T [facility] [address]



             ([facility]) (test code =                                        [facility],



             153)                                               [address]:



                                                                 /Techni

christina ID



                                                                 = [id number] for [place]

LLIS,



                                                                 BARBARA



POCT-GLUCOSE [id number] 08:43:00





             Test Item    Value        Reference Range Interpretation Comments

 

             POC-GLUCOSE METER 226 mg/dL           H            : TESTED A

[facility]



             ([facility]) (test code =                                        [facility],



             153)                                               [address]:



                                                                 /Techni

christina ID



                                                                 = [id number] for [place]

LLIS,



                                                                 BARBARA



POCT-GLUCOSE [id number] 21:11:00





             Test Item    Value        Reference Range Interpretation Comments

 

             POC-GLUCOSE METER 254 mg/dL           H            : Notified

 RN/MD:



             ([facility]) (test code =                                        TESTED

 AT [place] [address]



             153)                                               [facility],



                                                                 [address]:



                                                                 /Techni

christina ID



                                                                 = [id number] for KRANTHI PIERRE



POCT-GLUCOSE [id number] 21:02:00





             Test Item    Value        Reference Range Interpretation Comments

 

             POC-GLUCOSE METER 177 mg/dL           H            : TESTED A

[facility]



             ([facility]) (test code =                                        [facility],



             153)                                               [address]:



                                                                 /Techni

christina ID



                                                                 = [id number] for [place]

LLIS,



                                                                 BARBARA



POCT-GLUCOSE [id number] 12:31:00





             Test Item    Value        Reference Range Interpretation Comments

 

             POC-GLUCOSE METER 215 mg/dL           H            : TESTED A

[facility]



             ([facility]) (test code =                                        [facility],



             153)                                               [address]:



                                                                 /Techni

christina ID



                                                                 = [id number] for [place]

LLIS,



                                                                 BARBARA



WOUND CULTURE + GRAM [id number] 10:10:00





             Test Item    Value        Reference    Interpretation Comments



                                       Range                     

 

             CULTURE ([facility]) PROTEUS MIRABILIS              A            1+ Pro

teus



             (test code = 1095)                                        mirabilis

 

             Amikacin (test code =                           S            



             1)                                                  

 

             Ampicillin +                           S            



             Sulbactam (test code                                        



             = 6)                                                

 

             Aztreonam (test code                           S            



             = 32)                                               

 

             Cefepime (test code =                           S            



             51)                                                 

 

             Cefoxitin (test code                           R            



             = 68)                                               

 

             Ceftazidime (test                           S            



             code = 27)                                          

 

             Ceftriaxone (test                           S            



             code = 52)                                          

 

             Ertapenem (test code                           S            



             = 38)                                               

 

             Gentamicin (test code                           S            



             = 18)                                               

 

             Levofloxacin (test                           R            



             code = 22)                                          

 

             Meropenem (test code                           S            



             = 34)                                               

 

             Piperacillin +                           S            



             Tazobactam (test code                                        



             = 29)                                               

 

             Tetracycline (test                           R            



             code = 2)                                           

 

             Tobramycin (test code                           S            



             = 25)                                               

 

             Trimethoprim +                           R            



             Sulfamethoxazole                                        



             (test code = 47)                                        

 

             CULTURE (BEAKER) METHICILLIN               A            1+ Methicil

bryn



             (test code = 1095) RESISTANT                              resistant



                          STAPHYLOCOCCUS                           Staphylococcu

s



                          AUREUS                                 aureus

 

             Clindamycin (test                           R            



             code = 10)                                          

 

             Erythromycin (test                           R            



             code = 4)                                           

 

             Linezolid (test code                           S            



             = 40)                                               

 

             Nitrofurantoin (test                           S            



             code = 23)                                          

 

             Oxacillin (test code                           R            



             = 14)                                               

 

             Rifampin (test code =                           S            



             43)                                                 

 

             Tetracycline (test                           S            



             code = 2)                                           

 

             Trimethoprim +                           S            



             Sulfamethoxazole                                        



             (test code = 47)                                        

 

             Vancomycin (test code                           S            



             = 13)                                               

 

             CULTURE (BEAKER) ESCHERICHIA COLI              A            <1+ Esc

herichia



             (test code = 1095)                                        coliESBL 

Positive

 

             Amikacin (test code =                           S            



             1)                                                  

 

             Ampicillin +                           R            



             Sulbactam (test code                                        



             = 6)                                                

 

             Aztreonam (test code                           R            



             = 32)                                               

 

             Cefepime (test code =                           R            



             51)                                                 

 

             Cefoxitin (test code                           R            



             = 68)                                               

 

             Ceftazidime (test                           R            



             code = 27)                                          

 

             Ceftriaxone (test                           R            



             code = 52)                                          

 

             Ertapenem (test code                           S            



             = 38)                                               

 

             Gentamicin (test code                           S            



             = 18)                                               

 

             Levofloxacin (test                           R            



             code = 22)                                          

 

             Meropenem (test code                           S            



             = 34)                                               

 

             Nitrofurantoin (test                           S            



             code = 23)                                          

 

             Piperacillin +                           R            



             Tazobactam (test code                                        



             = 29)                                               

 

             Tetracycline (test                           S            



             code = 2)                                           

 

             Tobramycin (test code                           S            



             = 25)                                               

 

             Trimethoprim +                           R            



             Sulfamethoxazole                                        



             (test code = 47)                                        

 

             CULTURE (BEAKER)                           A            Beta-hemoly

tic



             (test code = 1095)                                        streptoco

ccus



                                                                 group B, by



                                                                 serological



                                                                 grouping

 

             GRAM STAIN RESULT 3+ WBCs                                



             (BEAKER) (test code =                                        



             1123)                                               

 

             GRAM STAIN RESULT 1+ gram negative                           



             (BEAKER) (test code = rods                                   



             595928)                                             

 

             GRAM STAIN RESULT 2+ gram positive                           



             (BEAKER) (test code = rods                                   



             410106)                                             

 

             GRAM STAIN RESULT <1+ gram negative                           



             (BEAKER) (test code = coccobacilli                           



             339267)                                             

 

             GRAM STAIN RESULT 2+ gram positive                           



             (BEAKER) (test code = cocci in pairs                           



             503259)                                             



POCT-GLUCOSE [id number] 08:52:00





             Test Item    Value        Reference Range Interpretation Comments

 

             POC-GLUCOSE METER 209 mg/dL           H            : TESTED A

T [facility] [address]



             (BEAKER) (test code =                                        [facility],



             153)                                               [address]:



                                                                 /Techni

christina ID



                                                                 = [id number] for [place]

LLIS,



                                                                 BARBARA



POCT-GLUCOSE [id number] 21:26:00





             Test Item    Value        Reference Range Interpretation Comments

 

             POC-GLUCOSE METER 255 mg/dL           H            : TESTED A

T [facility] [address]



             (BEAKER) (test code =                                        [facility],



             153)                                               [address]:



                                                                 /Techni

christina ID



                                                                 = [id number] for SHERRILL GUARDADO



POCT-GLUCOSE [id number] 17:34:00





             Test Item    Value        Reference Range Interpretation Comments

 

             POC-GLUCOSE METER 227 mg/dL           H            : TESTED A

T [facility] [address]



             (BEAKER) (test code =                                        [facility],



             1538)                                               [address]:



                                                                 /Techni

christina ID



                                                                 = [id number] for WASSERMAN

NNY,



                                                                 NAKITA



POCT-GLUCOSE [id number] 11:28:00





             Test Item    Value        Reference Range Interpretation Comments

 

             POC-GLUCOSE METER 282 mg/dL           H            : TESTED A

T [facility] [address]



             (BEAKER) (test code =                                        [facility],



             1538)                                               [address]:



                                                                 /Techni

christina ID



                                                                 = [id number] for WASSERMAN

NNY,



                                                                 NAKITA



POCT-GLUCOSE [id number] 08:19:00





             Test Item    Value        Reference Range Interpretation Comments

 

             POC-GLUCOSE METER 329 mg/dL           H            : TESTED A

T [facility] [address]20



             (BEAKER) (test code =                                        [facility],



             153)                                               [address]:



                                                                 /Techni

christina ID



                                                                 = [id number] for ANANT CATES



POCT-GLUCOSE [id number] 21:32:00





             Test Item    Value        Reference Range Interpretation Comments

 

             POC-GLUCOSE METER 275 mg/dL           H            : TESTED A

T [facility] 6720



             (BEAKER) (test code =                                        [facility]

LESLIE [facility],



             1538)                                               [address]:



                                                                 /Techni

christina ID



                                                                 = [id number] for SHERRILL GUARDADO



POCT-GLUCOSE [id number] 17:47:00





             Test Item    Value        Reference Range Interpretation Comments

 

             POC-GLUCOSE METER 304 mg/dL           H            : TESTED A

T [facility] 6720



             (BEAKER) (test code =                                        [facility]

LESLIE [facility],



             1538)                                               [address]:



                                                                 /Techni

christina ID



                                                                 = [id number] for HAWK WAN,



                                                                 LUIZA



URINE [id number] 15:21:00





             Test Item    Value        Reference Range Interpretation Comments

 

             CULTURE (BEAKER)                           A            >100,000 co

l/mL



             (test code = 1095)                                        Beta-hemo

lytic



                                                                 streptococcus g

roup



                                                                 B, by serologic

al



                                                                 grouping

 

             CULTURE (BEAKER) PROTEUS                   A            80-89,000 c

ol/mL



             (test code = 1095) MIRABILIS                              Proteus



                                                                 mirabilisESBL



                                                                 Positive

 

             Amikacin (test code =                           S            



             1)                                                  

 

             Ampicillin +                           R            



             Sulbactam (test code                                        



             = 6)                                                

 

             Aztreonam (test code                           R            



             = 32)                                               

 

             Cefepime (test code =                           R            



             51)                                                 

 

             Cefoxitin (test code                           R            



             = 68)                                               

 

             Ceftazidime (test                           R            



             code = 27)                                          

 

             Ceftriaxone (test                           R            



             code = 52)                                          

 

             Ertapenem (test code                           S            



             = 38)                                               

 

             Gentamicin (test code                           R            



             = 18)                                               

 

             Levofloxacin (test                           R            



             code = 22)                                          

 

             Meropenem (test code                           S            



             = 34)                                               

 

             Nitrofurantoin (test                           R            



             code = 23)                                          

 

             Tetracycline (test                           R            



             code = 2)                                           

 

             Tobramycin (test code                           R            



             = 25)                                               



POCT-GLUCOSE [id number] 12:16:00





             Test Item    Value        Reference Range Interpretation Comments

 

             POC-GLUCOSE METER 337 mg/dL           H            : TESTED A

T [facility] [address]



             (BEAKER) (test code =                                        [facility]

[place] [facility],



             1538)                                               [id number]:



                                                                 /Techni

christina ID



                                                                 = [id number] for LUIZA FIGUEROA



BASIC METABOLIC [id number] 10:39:00





             Test Item    Value        Reference Range Interpretation Comments

 

             SODIUM (BEAKER) 134 meq/L    136-145      L            



             (test code = 381)                                        

 

             POTASSIUM (BEAKER) 4.6 meq/L    3.5-5.1                   



             (test code = 379)                                        

 

             CHLORIDE (BEAKER) 105 meq/L                        



             (test code = 382)                                        

 

             CO2 (BEAKER) (test 20 meq/L     22-29        L            



             code = 355)                                         

 

             BLOOD UREA NITROGEN 14 mg/dL     7-21                      



             (BEAKER) (test code                                        



             = 354)                                              

 

             CREATININE (BEAKER) 0.97 mg/dL   0.57-1.25                 



             (test code = 358)                                        

 

             GLUCOSE RANDOM 429 mg/dL           HH           



             (BEAKER) (test code                                        



             = 652)                                              

 

             CALCIUM (BEAKER) 8.9 mg/dL    8.4-10.2                  



             (test code = 697)                                        

 

             EGFR (BEAKER) (test 107 mL/min/1.73                           ESTIM

ATED GFR IS



             code = 1092) sq m                                   NOT AS ACCURATE

 AS



                                                                 CREATININE



                                                                 CLEARANCE IN



                                                                 PREDICTING



                                                                 GLOMERULAR



                                                                 FILTRATION RATE

.



                                                                 ESTIMATED GFR I

S



                                                                 NOT APPLICABLE 

FOR



                                                                 DIALYSIS PATIEN

TS.



 ID - MAGAN FPOCT-GLUCOSE [id number] 08:29:00





             Test Item    Value        Reference Range Interpretation Comments

 

             POC-GLUCOSE METER 395 mg/dL           H            : TESTED A

T [facility] [address]



             (BEAKER) (test code =                                        [facility],



             1538)                                               [id number]:



                                                                 /Techni

christina ID



                                                                 = [id number] for LUIZA FIGUEROA



CBC W/PLT COUNT &amp; AUTO [id number] 06:40:00





             Test Item    Value        Reference Range Interpretation Comments

 

             WHITE BLOOD CELL COUNT (BEAKER) 7.2 K/ L     3.5-10.5              

    



             (test code = 775)                                        

 

             RED BLOOD CELL COUNT (BEAKER) 5.48 M/ L    4.63-6.08               

  



             (test code = 761)                                        

 

             HEMOGLOBIN (BEAKER) (test code = 15.1 GM/DL   13.7-17.5            

     



             410)                                                

 

             HEMATOCRIT (BEAKER) (test code = 46.4 %       40.1-51.0            

     



             411)                                                

 

             MEAN CORPUSCULAR VOLUME (BEAKER) 84.7 fL      79.0-92.2            

     



             (test code = 753)                                        

 

             MEAN CORPUSCULAR HEMOGLOBIN 27.6 pg      25.7-32.2                 



             (BEAKER) (test code = 751)                                        

 

             MEAN CORPUSCULAR HEMOGLOBIN CONC 32.5 GM/DL   32.3-36.5            

     



             (BEAKER) (test code = 752)                                        

 

             RED CELL DISTRIBUTION WIDTH 15.5 %       11.6-14.4    H            



             (BEAKER) (test code = 412)                                        

 

             PLATELET COUNT (BEAKER) (test 315 K/CU MM  150-450                 

  



             code = 756)                                         

 

             MEAN PLATELET VOLUME (BEAKER) 10.5 fL      9.4-12.4                

  



             (test code = 754)                                        

 

             NUCLEATED RED BLOOD CELLS 0 /100 WBC   0-0                       



             (BEAKER) (test code = 413)                                        

 

             NEUTROPHILS RELATIVE PERCENT 62 %                                  

 



             (BEAKER) (test code = 429)                                        

 

             LYMPHOCYTES RELATIVE PERCENT 26 %                                  

 



             (BEAKER) (test code = 430)                                        

 

             MONOCYTES RELATIVE PERCENT 9 %                                    



             (BEAKER) (test code = 431)                                        

 

             EOSINOPHILS RELATIVE PERCENT 2 %                                   

 



             (BEAKER) (test code = 432)                                        

 

             BASOPHILS RELATIVE PERCENT 0 %                                    



             (BEAKER) (test code = 437)                                        

 

             NEUTROPHILS ABSOLUTE COUNT 4.48 K/ L    1.78-5.38                 



             (BEAKER) (test code = 670)                                        

 

             LYMPHOCYTES ABSOLUTE COUNT 1.87 K/ L    1.32-3.57                 



             (BEAKER) (test code = 414)                                        

 

             MONOCYTES ABSOLUTE COUNT (BEAKER) 0.62 K/ L    0.30-0.82           

      



             (test code = 415)                                        

 

             EOSINOPHILS ABSOLUTE COUNT 0.17 K/ L    0.04-0.54                 



             (BEAKER) (test code = 416)                                        

 

             BASOPHILS ABSOLUTE COUNT (BEAKER) 0.03 K/ L    0.01-0.08           

      



             (test code = 417)                                        

 

             IMMATURE GRANULOCYTES-RELATIVE 1 %          0-1                    

   



             PERCENT (BEAKER) (test code =                                      

  



             2801)                                               



POCT-GLUCOSE METER2020-01-10 19:55:00





             Test Item    Value        Reference Range Interpretation Comments

 

             POC-GLUCOSE METER 369 mg/dL           H            : TESTED A

T [facility] [address]



             (BEAKER) (test code =                                        [facility],



             153)                                               [id number]:



                                                                 /Techni

christina ID



                                                                 = [id number] for SHERRILL GUARDADO



POCT-GLUCOSE METER2020-01-10 16:45:00





             Test Item    Value        Reference Range Interpretation Comments

 

             POC-GLUCOSE METER 272 mg/dL           H            : TESTED A

T [facility] [address]



             (BEAKER) (test code =                                        [facility],



             153)                                               [address]:



                                                                 /Techni

christina ID



                                                                 = [id number] for SARAH VIDES



POCT-GLUCOSE METER2020-01-10 11:40:00





             Test Item    Value        Reference Range Interpretation Comments

 

             POC-GLUCOSE METER 277 mg/dL           H            : TESTED A

T [facility] [address]



             (BEAKER) (test code =                                        [place],



             [address]38)                                               [address]:



                                                                 /Techni

christina ID



                                                                 = [id number] for SARAH VIDES



BASIC METABOLIC PANEL2020-01-10 10:22:00





             Test Item    Value        Reference Range Interpretation Comments

 

             SODIUM (BEAKER) 132 meq/L    136-145      L            



             (test code = 381)                                        

 

             POTASSIUM (BEAKER) 4.4 meq/L    3.5-5.1                   



             (test code = 379)                                        

 

             CHLORIDE (BEAKER) 103 meq/L                        



             (test code = 382)                                        

 

             CO2 (BEAKER) (test 21 meq/L     22-29        L            



             code = 355)                                         

 

             BLOOD UREA NITROGEN 14 mg/dL     7-21                      



             (BEAKER) (test code                                        



             = 354)                                              

 

             CREATININE (BEAKER) 0.89 mg/dL   0.57-1.25                 



             (test code = 358)                                        

 

             GLUCOSE RANDOM 428 mg/dL           HH           



             (BEAKER) (test code                                        



             = 652)                                              

 

             CALCIUM (BEAKER) 9.2 mg/dL    8.4-10.2                  



             (test code = 697)                                        

 

             EGFR (BEAKER) (test 119 mL/min/1.73                           ESTIM

ATED GFR IS



             code = 1092) sq m                                   NOT AS ACCURATE

 AS



                                                                 CREATININE



                                                                 CLEARANCE IN



                                                                 PREDICTING



                                                                 GLOMERULAR



                                                                 FILTRATION RATE

.



                                                                 ESTIMATED GFR I

S



                                                                 NOT APPLICABLE 

FOR



                                                                 DIALYSIS PATIEN

TS.



 ID - NTPLIPID PANEL2020-01-10 10:17:00





             Test Item    Value        Reference Range Interpretation Comments

 

             TRIGLYCERIDES (BEAKER) (test code = 692 mg/dL                      

        



             540)                                                

 

             CHOLESTEROL (BEAKER) (test code = 196 mg/dL                        

      



             631)                                                

 

             HDL CHOLESTEROL (BEAKER) (test code 24 mg/dL                       

        



             = 976)                                              



Calculated LDL not valid if triglyceride &gt;400 mg/dLTriglyceride Reference 
Range:   Low Risk  &lt;150   Borderline    150-199   High Risk     200-499   
Very High Risk  &gt;=500Cholesterol Reference Range:   Low Risk         &lt;200 
 Borderline    200-239    High Risk        &gt;240HDL Cholesterol Reference 
Range:   Low Risk         &gt;=60   High Risk         &lt;40LDL Cholesterol 
ReferenceRange:   Optimal          &lt;100   Near Optimal  100-129   Borderline 
  130-159   High          160-189   Very High       &gt;=190    ID - NTP
POCT-GLUCOSE METER2020-01-10 08:28:00





             Test Item    Value        Reference Range Interpretation Comments

 

             POC-GLUCOSE METER 388 mg/dL           H            : TESTED A

T [facility] [address]



             (BEAKER) (test code =                                        [place],



             [address]38)                                               [address]:



                                                                 /Techni

christina ID



                                                                 = [id number] for ANANT CATES



HEMOGLOBIN A1C2020-01-10 07:54:00





             Test Item    Value        Reference Range Interpretation Comments

 

             HEMOGLOBIN A1C (BEAKER) (test code = 10.4 %       4.3-6.1      H   

         



             368)                                                



CBC W/PLT COUNT &amp; AUTO DIFFERENTIAL2020-01-10 05:56:00





             Test Item    Value        Reference Range Interpretation Comments

 

             WHITE BLOOD CELL COUNT (BEAKER) 6.5 K/ L     3.5-10.5              

    



             (test code = 775)                                        

 

             RED BLOOD CELL COUNT (BEAKER) 5.21 M/ L    4.63-6.08               

  



             (test code = 761)                                        

 

             HEMOGLOBIN (BEAKER) (test code = 14.6 GM/DL   13.7-17.5            

     



             410)                                                

 

             HEMATOCRIT (BEAKER) (test code = 44.3 %       40.1-51.0            

     



             411)                                                

 

             MEAN CORPUSCULAR VOLUME (BEAKER) 85.0 fL      79.0-92.2            

     



             (test code = 753)                                        

 

             MEAN CORPUSCULAR HEMOGLOBIN 28.0 pg      25.7-32.2                 



             (BEAKER) (test code = 751)                                        

 

             MEAN CORPUSCULAR HEMOGLOBIN CONC 33.0 GM/DL   32.3-36.5            

     



             (BEAKER) (test code = 752)                                        

 

             RED CELL DISTRIBUTION WIDTH 15.6 %       11.6-14.4    H            



             (BEAKER) (test code = 412)                                        

 

             PLATELET COUNT (BEAKER) (test 294 K/CU MM  150-450                 

  



             code = 756)                                         

 

             MEAN PLATELET VOLUME (BEAKER) 11.2 fL      9.4-12.4                

  



             (test code = 754)                                        

 

             NUCLEATED RED BLOOD CELLS 0 /100 WBC   0-0                       



             (BEAKER) (test code = 413)                                        

 

             NEUTROPHILS RELATIVE PERCENT 56 %                                  

 



             (BEAKER) (test code = 429)                                        

 

             LYMPHOCYTES RELATIVE PERCENT 31 %                                  

 



             (BEAKER) (test code = 430)                                        

 

             MONOCYTES RELATIVE PERCENT 10 %                                   



             (BEAKER) (test code = 431)                                        

 

             EOSINOPHILS RELATIVE PERCENT 2 %                                   

 



             (BEAKER) (test code = 432)                                        

 

             BASOPHILS RELATIVE PERCENT 1 %                                    



             (BEAKER) (test code = 437)                                        

 

             NEUTROPHILS ABSOLUTE COUNT 3.66 K/ L    1.78-5.38                 



             (BEAKER) (test code = 670)                                        

 

             LYMPHOCYTES ABSOLUTE COUNT 2.03 K/ L    1.32-3.57                 



             ([facility]) (test code = 414)                                        

 

             MONOCYTES ABSOLUTE COUNT (BEAKER) 0.63 K/ L    0.30-0.82           

      



             (test code = 415)                                        

 

             EOSINOPHILS ABSOLUTE COUNT 0.15 K/ L    0.04-0.54                 



             (AKER) (test code = 416)                                        

 

             BASOPHILS ABSOLUTE COUNT ([facility]) 0.03 K/ L    0.01-0.08           

      



             (test code = 417)                                        

 

             IMMATURE GRANULOCYTES-RELATIVE 1 %          0-1                    

   



             PERCENT ([facility]) (test code =                                      

  



             2801)                                               



POCT-GLUCOSE [id number] 23:47:00





             Test Item    Value        Reference Range Interpretation Comments

 

             POC-GLUCOSE METER 359 mg/dL           H            : Notified

 RN/MD:



             ([facility]) (test code =                                        TESTED

 AT [place] [address]



             153)                                               [facility],



                                                                 [address]:



                                                                 /Techni

christina ID



                                                                 = [id number] for



                                                                 LATHBRIDGE, ALESSIA

ICE



POCT-GLUCOSE [id number] 21:13:00





             Test Item    Value        Reference Range Interpretation Comments

 

             POC-GLUCOSE METER 315 mg/dL           H            : TESTED A

[facility]



             ([facility]) (test code =                                        [facility],



             153)                                               [address]:



                                                                 /Techni

christina ID



                                                                 = [id number] for Sm

ith,



                                                                 Nicki



POCT-GLUCOSE [id number] 21:13:00





             Test Item    Value        Reference Range Interpretation Comments

 

             POC-GLUCOSE METER 331 mg/dL           H            : TESTED A

[facility]



             ([facility]) (test code =                                        [facility],



             153)                                               [address]:



                                                                 /Techni

christina ID



                                                                 = [id number] for RO

DGERS,



                                                                 JAMECA



POCT-GLUCOSE [id number] 10:30:00





             Test Item    Value        Reference Range Interpretation Comments

 

             POC-GLUCOSE METER 341 mg/dL           H            : TESTED A

[facility]



             ([facility]) (test code =                                        [facility],



             153)                                               [address]:



                                                                 /Techni

christina ID



                                                                 = [id number] for Sm

ith,



                                                                 Nicki



CBC W/PLT COUNT &amp; AUTO [id number] 08:48:00





             Test Item    Value        Reference Range Interpretation Comments

 

             WHITE BLOOD CELL COUNT (BEAKER) 6.7 K/ L     3.5-10.5              

    



             (test code = 775)                                        

 

             RED BLOOD CELL COUNT (BEAKER) 5.28 M/ L    4.63-6.08               

  



             (test code = 761)                                        

 

             HEMOGLOBIN (BEAKER) (test code = 14.6 GM/DL   13.7-17.5            

     



             410)                                                

 

             HEMATOCRIT (BEAKER) (test code = 44.9 %       40.1-51.0            

     



             411)                                                

 

             MEAN CORPUSCULAR VOLUME (BEAKER) 85.0 fL      79.0-92.2            

     



             (test code = 753)                                        

 

             MEAN CORPUSCULAR HEMOGLOBIN 27.7 pg      25.7-32.2                 



             (BEAKER) (test code = 751)                                        

 

             MEAN CORPUSCULAR HEMOGLOBIN CONC 32.5 GM/DL   32.3-36.5            

     



             (BEAKER) (test code = 752)                                        

 

             RED CELL DISTRIBUTION WIDTH 15.3 %       11.6-14.4    H            



             (BEAKER) (test code = 412)                                        

 

             PLATELET COUNT (BEAKER) (test 300 K/CU MM  150-450                 

  



             code = 756)                                         

 

             MEAN PLATELET VOLUME (BEAKER) 10.4 fL      9.4-12.4                

  



             (test code = 754)                                        

 

             NUCLEATED RED BLOOD CELLS 0 /100 WBC   0-0                       



             (BEAKER) (test code = 413)                                        



(MANUAL DIFFERENTIAL)2020 08:48:00





             Test Item    Value        Reference Range Interpretation Comments

 

             NEUTROPHILS - REL (DIFF) (BEAKER) 69 %                             

      



             (test code = 1359)                                        

 

             LYMPHOCYTES - REL (DIFF) (BEAKER) 25 %                             

      



             (test code = 1360)                                        

 

             MONOCYTES - REL (DIFF) (BEAKER) 3 %                                

    



             (test code = 1361)                                        

 

             EOSINOPHILS - REL (DIFF) (BEAKER) 3 %                              

      



             (test code = 1362)                                        

 

             BASOPHILS - REL (DIFF) (BEAKER) 0 %                                

    



             (test code = 1363)                                        

 

             NEUTROPHILS - ABS (DIFF) (BEAKER) 4.62 K/ L    1.80-8.00           

      



             (test code = 1365)                                        

 

             LYMPHOCYTES - ABS (DIFF) (BEAKER) 1.68 K/ L    1.48-4.50           

      



             (test code = 1366)                                        

 

             MONOCYTES - ABS (DIFF) (BEAKER) 0.20 K/ L    0.00-1.30             

    



             (test code = 1367)                                        

 

             EOSINOPHILS - ABS (DIFF) (BEAKER) 0.20 K/ L    0.00-0.50           

      



             (test code = 1368)                                        

 

             BASOPHILS - ABS (DIFF) (BEAKER) 0.00 K/ L    0.00-0.20             

    



             (test code = 1369)                                        

 

             TOTAL COUNTED (BEAKER) (test code = 100                            

        



             1351)                                               

 

             PLT MORPHOLOGY (BEAKER) (test code Normal                          

       



             = 486)                                              

 

             RBC MORPHOLOGY (BEAKER) (test code Normal                          

       



             = 762)                                              

 

             SMUDGE CELLS (BEAKER) (test code = Present                         

       



             1371)                                               



HEMOGLOBIN [id number] 06:39:00





             Test Item    Value        Reference Range Interpretation Comments

 

             HEMOGLOBIN A1C (BEAKER) (test code = 10.5 %       4.3-6.1      H   

         



             368)                                                



LIPID [id number] 04:57:00





             Test Item    Value        Reference Range Interpretation Comments

 

             TRIGLYCERIDES (BEAKER) 1251 mg/dL                             Speci

men moderately



             (test code = 540)                                        hemolyzed

 

             CHOLESTEROL (BEAKER) 215 mg/dL                              Specime

n moderately



             (test code = 631)                                        hemolyzed

 

             HDL CHOLESTEROL 22 mg/dL                               



             (BEAKER) (test code =                                        



             976)                                                



Calculated LDL not valid if triglyceride &gt;400 mg/dLTriglyceride Reference 
Range:   Low Risk  &lt;150   Borderline    150-199   High Risk     200-499   
Very High Risk  &gt;=500Cholesterol Reference Range:   Low Risk         &lt;200 
 Borderline    200-239    High Risk        &gt;240HDL Cholesterol Reference 
Range:   Low Risk         &gt;=60   High Risk         &lt;40LDL Cholesterol 
ReferenceRange:   Optimal          &lt;100   Near Optimal  100-129   Borderline 
  130-159   High          160-189   Very High       &gt;=190   Specimen 
moderately lipemicBASIC METABOLIC [id number] 04:56:00





             Test Item    Value        Reference Range Interpretation Comments

 

             SODIUM (BEAKER) 137 meq/L    136-145                   



             (test code = 381)                                        

 

             POTASSIUM (BEAKER) 4.6 meq/L    3.5-5.1                   Specimen 

moderately



             (test code = 379)                                        hemolyzed

 

             CHLORIDE (BEAKER) 106 meq/L                        



             (test code = 382)                                        

 

             CO2 (BEAKER) (test 20 meq/L     22-29        L            



             code = 355)                                         

 

             BLOOD UREA NITROGEN 12 mg/dL     7-21                      



             (BEAKER) (test code                                        



             = 354)                                              

 

             CREATININE (BEAKER) 0.95 mg/dL   0.57-1.25                 Specimen

 moderately



             (test code = 358)                                        hemolyzed

 

             GLUCOSE RANDOM 398 mg/dL           H            



             (BEAKER) (test code                                        



             = 652)                                              

 

             CALCIUM (BEAKER) 8.8 mg/dL    8.4-10.2                  



             (test code = 697)                                        

 

             EGFR (BEAKER) (test 110 mL/min/1.73                           ESTIM

ATED GFR IS



             code = 1092) sq m                                   NOT AS ACCURATE

 AS



                                                                 CREATININE



                                                                 CLEARANCE IN



                                                                 PREDICTING



                                                                 GLOMERULAR



                                                                 FILTRATION RATE

.



                                                                 ESTIMATED GFR I

S



                                                                 NOT APPLICABLE 

FOR



                                                                 DIALYSIS PATIEN

TS.



POCT-GLUCOSE [id number] 21:53:00





             Test Item    Value        Reference Range Interpretation Comments

 

             POC-GLUCOSE METER > mg/dL             HH           : Notified

 RN/MD: TESTED



             (KUN) (test code =                                        AT [address]



             [address])                                               [facility], [address]

30:



                                                                 /Techni

christina ID =



                                                                 [id number] for ZECHARIAH TRACY



U/S, ABDOMINAL, [id number] 19:54:00Reason for exam:-&gt;Evaluation of  
shunt and for possible cyst or pseudocyst Should this be performed at the 
bedside?-&gt;YesFINAL REPORT PATIENT ID:   [id number] Limited abdominal 
ultrasound. CLINICAL HISTORY: Evaluation of VPshunt for possible cyst or 
pseudocyst. COMPARISON STUDY: None available. FINDINGS: Sonographic assessment 
of the abdomen was performed assessing for fluid in the region of the patient's 
shunts. No fluid collections are seen. However, the study is limited by the 
patient's body habitus. CT scan would bemore sensitive. Signed: Madison Hendrickson 
MDReport Verified Date/Time:  2020 19:54:10 Reading Location: [address]
Consult Reading Room      Electronically signed by: MADISON HENDRICKSON M.D. on 
2020 07:54 PMPOCT-GLUCOSE [id number] 19:34:00





             Test Item    Value        Reference Range Interpretation Comments

 

             POC-GLUCOSE METER 474 mg/dL           HH           : Notified

 RN/MD:



             (RAJEEVTAL) (test code =                                        TESTED

 AT [facility] [address])                                               [facility],



                                                                 [id number]:



                                                                 /Techni

christina ID



                                                                 = [id number] for LONA URIOSTEGUI



URINALYSIS W/ REFLEX URINE [id number] 17:48:00





             Test Item    Value        Reference Range Interpretation Comments

 

             COLOR (BEAKER) (test code = 470) Yellow                            

     

 

             CLARITY (BEAKER) (test code = Hazy                                 

  



             469)                                                

 

             SPECIFIC GRAVITY UA (BEAKER) 1.020        1.001-1.035              

 



             (test code = 468)                                        

 

             PH UA (BEAKER) (test code = 467) 6.0          5.0-8.0              

     

 

             PROTEIN UA (BEAKER) (test code = 20 mg/dL     Negative     A       

     



             464)                                                

 

             GLUCOSE UA (BEAKER) (test code = >1000 mg/dL  Negative     A       

     



             365)                                                

 

             KETONES UA (BEAKER) (test code = 40 mg/dL     Negative     A       

     



             371)                                                

 

             BILIRUBIN UA (BEAKER) (test code Negative     Negative             

     



             = 462)                                              

 

             BLOOD UA (BEAKER) (test code = Moderate     Negative     A         

   



             461)                                                

 

             NITRITE UA (BEAKER) (test code = Positive     Negative     A       

     



             465)                                                

 

             LEUKOCYTE ESTERASE UA (BEAKER) Large        Negative     A         

   



             (test code = 466)                                        

 

             UROBILINOGEN UA (BEAKER) (test 0.2 mg/dL    0.2-1.0                

   



             code = 463)                                         

 

             RBC UA (BEAKER) (test code = 519) 0 /HPF                           

      

 

             WBC UA (BEAKER) (test code = 520) 267 /HPF                         

      

 

             BACTERIA (BEAKER) (test code = Moderate                            

   



             517)                                                

 

             SOURCE(BEAKER) (test code = 0065)                                  

      



CBC W/PLT COUNT &amp; AUTO [id number] 17:38:00





             Test Item    Value        Reference Range Interpretation Comments

 

             WHITE BLOOD CELL COUNT (BEAKER) 7.8 K/ L     3.5-10.5              

    



             (test code = 775)                                        

 

             RED BLOOD CELL COUNT (BEAKER) 5.12 M/ L    4.63-6.08               

  



             (test code = 761)                                        

 

             HEMOGLOBIN (BEAKER) (test code = 14.7 GM/DL   13.7-17.5            

     



             410)                                                

 

             HEMATOCRIT (BEAKER) (test code = 43.3 %       40.1-51.0            

     



             411)                                                

 

             MEAN CORPUSCULAR VOLUME (BEAKER) 84.6 fL      79.0-92.2            

     



             (test code = 753)                                        

 

             MEAN CORPUSCULAR HEMOGLOBIN 28.7 pg      25.7-32.2                 



             (BEAKER) (test code = 751)                                        

 

             MEAN CORPUSCULAR HEMOGLOBIN CONC 33.9 GM/DL   32.3-36.5            

     



             (BEAKER) (test code = 752)                                        

 

             RED CELL DISTRIBUTION WIDTH 15.3 %       11.6-14.4    H            



             (BEAKER) (test code = 412)                                        

 

             PLATELET COUNT (BEAKER) (test 344 K/CU MM  150-450                 

  



             code = 756)                                         

 

             MEAN PLATELET VOLUME (BEAKER) 11.0 fL      9.4-12.4                

  



             (test code = 754)                                        

 

             NUCLEATED RED BLOOD CELLS 0 /100 WBC   0-0                       



             (BEAKER) (test code = 413)                                        



(CELLAVISION MANUAL DIFF)2020 17:38:00





             Test Item    Value        Reference Range Interpretation Comments

 

             NEUTROPHILS - REL 63 %                                   



             (CELLAVISION)(BEAKER) (test code                                   

     



             = 2816)                                             

 

             LYMPHOCYTES - REL 23 %                                   



             (CELLAVISION)(BEAKER) (test code                                   

     



             = 2817)                                             

 

             MONOCYTES - REL 6 %                                    



             (CELLAVISION)(BEAKER) (test code                                   

     



             = 2818)                                             

 

             EOSINOPHILS - REL 5 %                                    



             (CELLAVISION)(BEAKER) (test code                                   

     



             = 2819)                                             

 

             BASOPHILS - REL 1 %                                    



             (CELLAVISION)(BEAKER) (test code                                   

     



             = 2820)                                             

 

             BANDS - REL (CELLAVISION)(BEAKER) 2 %          0-10                

      



             (test code = 2826)                                        

 

             NEUTROPHILS - ABS 4.91 K/ul    1.78-5.38                 



             (CELLAVISION)(BEAKER) (test code                                   

     



             = 2830)                                             

 

             LYMPHOCYTES - ABS 1.79 K/ul    1.32-3.57                 



             (CELLAVISION)(BEAKER) (test code                                   

     



             = 2831)                                             

 

             MONOCYTES - ABS 0.47 K/uL    0.30-0.82                 



             (CELLAVISION)(BEAKER) (test code                                   

     



             = 2832)                                             

 

             EOSINOPHILS - ABS 0.39 K/uL    0.04-0.54                 



             (CELLAVISION)(BEAKER) (test code                                   

     



             = 2834)                                             

 

             BASOPHILS - ABS 0.08 K/uL    0.01-0.08                 



             (CELLAVISION)(BEAKER) (test code                                   

     



             = 2835)                                             

 

             BANDS - ABS (CELLAVISION)(BEAKER) 0.16 K/uL    0.00-0.80           

      



             (test code = 2840)                                        

 

             TOTAL COUNTED (BEAKER) (test code 100                              

      



             = 1351)                                             

 

             SMUDGE CELLS (BEAKER) (test code Present                           

     



             = 1371)                                             

 

             GIANT PLATELETS (BEAKER) (test Present                             

   



             code = 313)                                         

 

             ANISOCYTOSIS (BEAKER) (test code 1+ few                            

     



             = 961)                                              

 

             POIKILOCYTES (BEAKER) (test code 2+ moderate                       

     



             = 966)                                              

 

             SPHEROCYTES (BEAKER) (test code = 1+ few                           

      



             768)                                                

 

             OVALOCYTES (BEAKER) (test code = 1+ few                            

     



             477)                                                

 

             TEAR DROP CELLS (BEAKER) (test 1+ few                              

   



             code = 481)                                         

 

             ARTIFACT (CELLAVISION)(BEAKER) Present                             

   



             (test code = 3432)                                        

 

             PLATELET CONCENTRATION Adequate                               



             (CELLAVISION)(BEAKER) (test code                                   

     



             = 3438)                                             



Received comment: User comments: Slide comments:POCT-GLUCOSE [id number] 
16:27:00





             Test Item    Value        Reference Range Interpretation Comments

 

             POC-GLUCOSE METER 424 mg/dL           HH           : Notified

 RN/MD:



             (BEAKER) (test code =                                        TESTED

 AT [facility] [address])                                               [facility],



                                                                 [address]:



                                                                 /Techni

christina ID



                                                                 = [id number] for AK

INSONU,



                                                                 LONA



CT, BRAIN, WITHOUT [id number] 15:31:00FINAL REPORT PATIENT ID:   
[id number] CT, BRAIN, WITHOUT CONTRAST CLINICAL INDICATION:  Hydrocephalus C
OMPARISON: None TECHNIQUE:  Noncontrast axial CT imaging of the brain and skull.
 DOSE REDUCTION: Dose modulation, iterative reconstruction, and/or weight-based 
adjustment of the mA/kV was utilized to reduce the radiation dose to as low as 
reasonably achievable. FINDINGS:A total of two ventricular drainage catheters 
are present. A right transverse temporal catheter terminates across the midline 
just above the level of the foramen of Monro. A right parietal approach catheter
terminates in the pineal region. Supratentorial ventricular configuration is 
slitlike. There is no herniation. The basilar cisterns are preserved. There is 
no identifiable recent infarct. 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 Calvarial configuration escape of 
cephalic. Thereis no acute osseous abnormality.  IMPRESSION: Chronic, likely 
congenital parenchymal changes withoutrecent infarct or hemorrhage. A total of 
two ventricular drainage catheters are present. Supratentorial ventricular 
configuration is slitlike and may represent over shunting. Correlation 
recommended. Signed: JR Rae Robert MDReport Verified Date/Time:  
2020 15:31:08 Reading Location: [address] Neuro Reading Room    
Electronically signed by: ALONDRA RAE on 2020 03:31 PMRAD, 
SHUNT [id number] 15:13:00Reason for exam:-&gt;concern for VPS malfunction
FINAL REPORT PATIENT ID:   [id number] RAD, SHUNT SERIES INDICATION: concern for 
VPS malfunction COMPARISON: None TECHNIQUE: AP and lateral radiographs of the 
skull, abdomen and chest were acquired to evaluate shunt catheter FINDINGS:An 
abandoned shunt catheter is present within the right neck. Medial tothis a 
second shunt catheter is present which descends along the left chest wall, 
terminating withinthe mid pelvis. Radiopaque portions of the catheter appear 
contiguous. Signed: Lisa Tan MDReport Verified Date/Time:  2020 
15:13:54 Reading Location: [facility] Radiology Reading Room  Electronically 
signed by: LISA TAN MD on 2020 03:13 PMPOCT-GLUCOSE METER
2020 11:38:00





             Test Item    Value        Reference Range Interpretation Comments

 

             POC-GLUCOSE METER 394 mg/dL           H            : TESTED A

T [facility] [address]



             [facility] (test code =                                        MILY NELSON [facility],



             [address]38)                                               [id number]:



                                                                 /Techni

christina ID



                                                                 = [id number] for LONA URIOSTEGUI



HEMOGLOBIN [id number] 09:15:00





             Test Item    Value        Reference Range Interpretation Comments

 

             HEMOGLOBIN A1C (BEAKER) (test code = 10.4 %       4.3-6.1      H   

         



             368)                                                



TSH/FREE T4 IF [id number] 07:54:00





             Test Item    Value        Reference Range Interpretation Comments

 

             THYROID STIMULATING HORMONE 1.40 uIU/mL  0.35-4.94                 



             (BEAKER) (test code = 772)                                        



POCT-GLUCOSE [id number] 07:48:00





             Test Item    Value        Reference Range Interpretation Comments

 

             POC-GLUCOSE METER > mg/dL             HH           : Notified

 RN/MD: TESTED



             (BEAKER) (test code =                                        AT [facility] [address] [facility]



             1538)                                               [facility], [address]

30:



                                                                 /Techni

christina ID =



                                                                 [id number] for LONA GOLDSMITH



BASIC METABOLIC [id number] 07:44:00





             Test Item    Value        Reference Range Interpretation Comments

 

             SODIUM (BEAKER) 134 meq/L    136-145      L            



             (test code = 381)                                        

 

             POTASSIUM (BEAKER) 4.4 meq/L    3.5-5.1                   



             (test code = 379)                                        

 

             CHLORIDE (BEAKER) 103 meq/L                        



             (test code = 382)                                        

 

             CO2 (BEAKER) (test 20 meq/L     22-29        L            



             code = 355)                                         

 

             BLOOD UREA NITROGEN 17 mg/dL     7-21                      



             (BEAKER) (test code                                        



             = 354)                                              

 

             CREATININE (BEAKER) 1.09 mg/dL   0.57-1.25                 



             (test code = 358)                                        

 

             GLUCOSE RANDOM 464 mg/dL           HH           



             (BEAKER) (test code                                        



             = 652)                                              

 

             CALCIUM (BEAKER) 8.6 mg/dL    8.4-10.2                  



             (test code = 697)                                        

 

             EGFR (BEAKER) (test 94 mL/min/1.73                           ESTIMA

HUMA GFR IS



             code = 1092) sq m                                   NOT AS ACCURATE

 AS



                                                                 CREATININE



                                                                 CLEARANCE IN



                                                                 PREDICTING



                                                                 GLOMERULAR



                                                                 FILTRATION RATE

.



                                                                 ESTIMATED GFR I

S



                                                                 NOT APPLICABLE 

FOR



                                                                 DIALYSIS PATIEN

TS.



LIPID [id number] 07:34:00





             Test Item    Value        Reference Range Interpretation Comments

 

             TRIGLYCERIDES (BEAKER) (test code = 750 mg/dL                      

        



             540)                                                

 

             CHOLESTEROL (BEAKER) (test code = 196 mg/dL                        

      



             631)                                                

 

             HDL CHOLESTEROL (BEAKER) (test code 22 mg/dL                       

        



             = 976)                                              



Calculated LDL not valid if triglyceride &gt;400 mg/dLTriglyceride Reference 
Range:   Low Risk  &lt;150   Borderline    150-199   High Risk     200-499   
Very High Risk  &gt;=500Cholesterol Reference Range:   Low Risk         &lt;200 
 Borderline    200-239    High Risk        &gt;240HDL Cholesterol Reference 
Range:   Low Risk         &gt;=60   High Risk         &lt;40LDL Cholesterol 
ReferenceRange:   Optimal          &lt;100   Near Optimal  100-129   Borderline 
  130-159   High          160-189   Very High       &gt;=190POCT-GLUCOSE METER
2020 00:49:00





             Test Item    Value        Reference Range Interpretation Comments

 

             POC-GLUCOSE METER 399 mg/dL           H            : TESTED A

T [facility] [address]



             [facility] (test code =                                        [place],



             1538)                                               [id number]:



                                                                 /Techni

christina ID



                                                                 = [id number] for MALLIKA

BREACLAY



RAD, FOOT, 2 VIEWS, [id number] 22:28:00Reason for exam:-&gt;osteomyletitis
FINAL REPORT PATIENT ID:   [id number] TECHNIQUE: Two views each of the bilateral 
feet HISTORY: osteomyletitis. COMPARISON: None. IMPRESSION:No acute displaced 
fracture or dislocation. Joint spaces are within normal limits.Severe soft 
tissue swelling.On the right subcentimeter nonspecific calcifications adjacent 
to the heel plantar aspect. Correlate with physical exam. Severely limited exam 
due to patient body habitus. No definite cortical irregularity.There is clinical
concern for osteomyelitis, recommend MRI with contrast. Signed: Sriram Townsend Verified Date/Time:  2020 22:28:25 Reading Location: [address]
Consult Reading Room  Electronically signed by: SRIRAM TOWNSEND DO on 
2020 10:28 PMRAD, FOOT, 2 VIEWS, [id number] 22:28:00Reason for 
exam:-&gt;osteomyletitsFINAL REPORT PATIENT ID:   [id number] TECHNIQUE: Two views 
each of the bilateral feet HISTORY: osteomyletitis. COMPARISON: None. 
IMPRESSION:No acute displaced fracture or dislocation. Joint spaces are within 
normal limits.Severe soft tissue swelling.On the right subcentimeter nonspecific
calcifications adjacent to the heel plantar aspect. Correlate with physical 
exam. Severely limited exam due to patient body habitus. No definite cortical 
irregularity.There is clinical concern for osteomyelitis, recommend MRI with 
contrast. Signed: Sriram Townsend Verified Date/Time:  2020 
22:28:25 Reading Location: [address] Consult Reading Room  Electronically 
signed by: SRIRAM TOWNSEND DO on 2020 10:28 PMPOCT-GLUCOSE METER
2020 21:04:00





             Test Item    Value        Reference Range Interpretation Comments

 

             POC-GLUCOSE METER 430 mg/dL           HH           : Notified

 RN/MD:



             (KUN) (test code =                                        TESTED

 AT [facility] [address]



             1538)                                               [facility],



                                                                 [id number]:



                                                                 /Techni

christina ID



                                                                 = [id number] for DEYSI ADRIAN



ERTAPENEM:SUSC:PT:ISOLATE:ORDQN:[id number] 16:48:00





             Test Item    Value        Reference Range Interpretation Comments

 

             Culture: Urine (test >100,000 CFU/mL Proteus                       

    



             code = Culture: mirabilis 10,000 -                           



             Urine)       50,000 CFU/mL Skin Jaime                           



[facility]PT:ISOLATE:ORDQN:[id number] 16:48:00





             Test Item    Value        Reference Range Interpretation Comments

 

             Proteus mirabilis (test Proteus mirabilis                          

 



             code = Proteus mirabilis)                                        



[facility] AND [id number] 16:48:00





             Test Item    Value        Reference Range Interpretation Comments

 

             UA Nitrite (test code Negative (18 10:48                      

     



             = UA Nitrite) AM)                                    



[facility] AND [id number] 16:48:00





             Test Item    Value        Reference Range Interpretation Comments

 

             UA Bili (test code = Negative *NA*(18                         

  



             UA Bili)     10:48 AM)                              



[facility] AND [id number] 16:48:00





             Test Item    Value        Reference Range Interpretation Comments

 

             UA Ketones (test code Negative *NA*(18                        

   



             = UA Ketones) 10:48 AM)                              



[facility] AND [id number] 16:48:00





             Test Item    Value        Reference Range Interpretation Comments

 

             UA Blood (test code = Trace *ABN*(18                          

 



             UA Blood)    10:48 AM)                              



[facility] AND [id number] 16:48:00





             Test Item    Value        Reference Range Interpretation Comments

 

             UA Urobilinogen (test code = UA 0.2          0.1-1.0               

    



             Urobilinogen)                                        



[facility] AND [id number] 16:48:00





             Test Item    Value        Reference Range Interpretation Comments

 

             UA Leuk Est (test code Large *ABN*(18                         

  



             = UA Leuk Est) 10:48 AM)                              



[facility] AND [id number] 16:48:00





             Test Item    Value        Reference Range Interpretation Comments

 

             UA Protein (test code Negative (18 10:48                      

     



             = UA Protein) AM)                                    



[facility] AND [id number] 16:48:00





             Test Item    Value        Reference Range Interpretation Comments

 

             UA Glucose (test code Negative (18 10:48                      

     



             = UA Glucose) AM)                                    



[facility] AND [id number] 16:48:00





             Test Item    Value        Reference Range Interpretation Comments

 

             UA pH (test code = UA pH) 7.0 1        5.0-8.0                   



[facility] AND [id number] 16:48:00





             Test Item    Value        Reference Range Interpretation Comments

 

             UA Spec Grav (test code = UA Spec 1.015 1                          

      



             Grav)                                               



[facility] AND [id number] 16:48:00





             Test Item    Value        Reference Range Interpretation Comments

 

             UA Color (test code = Yellow *NA*(18                          

 



             UA Color)    10:48 AM)                              



[facility] AND [id number] 16:48:00





             Test Item    Value        Reference Range Interpretation Comments

 

             UA Turbidity (test code = Clear (18 10:48                     

      



             UA Turbidity) AM)                                    



[facility] AND [id number] 16:48:00





             Test Item    Value        Reference Range Interpretation Comments

 

             UA Mucus (test code = UA Mucus) Few /LPF                           

    



[facility] AND [id number] 16:48:00





             Test Item    Value        Reference Range Interpretation Comments

 

             UA Bacteria (test code = UA Few /HPF                               



             Bacteria)                                           



[facility] AND [id number] 16:48:00





             Test Item    Value        Reference Range Interpretation Comments

 

             UA RBC (test code = 0-2 /HPF     See_Comment                [Automa

huma message] The



             UA RBC)                                             system which ge

nerated



                                                                 this result tra

nsmitted



                                                                 reference range

: <=2. The



                                                                 reference range

 was not



                                                                 used to interpr

et this



                                                                 result as zayda

l/abnormal.



[facility] AND [id number] 16:48:00





             Test Item    Value        Reference Range Interpretation Comments

 

             UA Sq Epi (test code = None Seen (18                          

 



             UA Sq Epi)   10:48 AM)                              



[facility] AND [id number] 16:48:00





             Test Item    Value        Reference Range Interpretation Comments

 

             UA WBC (test code = UA WBC)  /HPF                            



[facility] [id number] 11:57:00





             Test Item    Value        Reference Range Interpretation Comments

 

             Antibody Scrn (test Negative (18 5:57                         

  



             code = Antibody Scrn) AM)                                    



[facility] [id number] 11:57:00





             Test Item    Value        Reference Range Interpretation Comments

 

             ABO/Rh (test code = ABO/Rh) AB POS                                 



[facility] Region[id number] 11:57:00





             Test Item    Value        Reference Range Interpretation Comments

 

             PTT (test code = PTT) 33.4 s       22.9-35.8                 



[facility] [id number] 11:57:00





             Test Item    Value        Reference Range Interpretation Comments

 

             PT (test code = PT) 13.7 s       12.0-14.7                 



[facility] R[id number] 11:57:00





             Test Item    Value        Reference Range Interpretation Comments

 

             INR (test code = INR) 1.05 1       0.85-1.17                 



[facility] RegionalQnxfequBFDNBJEINT4705-89-75 10:50:01





             Test Item    Value        Reference Range Interpretation Comments

 

             RDW (test code = RDW) 18.9         11.5-14.5                 



[facility] RegionalGzkpqgoAXXLTHDXGP3815-29-29 10:50:01





             Test Item    Value        Reference Range Interpretation Comments

 

             Hct (test code = Hct) 43.3         42.0-54.0                 



[facility] RegionalVedtjvtYVFXSDPSDA0681-90-39 10:50:01





             Test Item    Value        Reference Range Interpretation Comments

 

             WBC (test code = WBC) 9.3          3.7-10.4                  



[facility] RegionalZdabmwpPLPMPQBMIP3389-36-28 10:50:01





             Test Item    Value        Reference Range Interpretation Comments

 

             Hgb (test code = Hgb) 14.7         14.0-18.0                 



[facility] RegionalFzzszieYZWTJXJNUA2078-14-92 10:50:01





             Test Item    Value        Reference Range Interpretation Comments

 

             RBC (test code = RBC) 5.36         4.70-6.10                 



[facility] Reg[id number] 10:50:01





             Test Item    Value        Reference Range Interpretation Comments

 

             Eosinophils # (test code 0.2          See_Comment                [A

utomated message] The



             = Eosinophils #)                                        system whic

h generated



                                                                 this result tra

nsmitted



                                                                 reference range

: <=0.5.



                                                                 The reference r

zhen was



                                                                 not used to int

erpret



                                                                 this result as



                                                                 normal/abnormal

.



[facility] Regi[id number] 10:50:01





             Test Item    Value        Reference Range Interpretation Comments

 

             Basophils # (test code 0.1          See_Comment                [Aut

omated message] The



             = Basophils #)                                        system which 

generated



                                                                 this result tra

nsmitted



                                                                 reference range

: <=0.2.



                                                                 The reference r

zhen was



                                                                 not used to int

erpret



                                                                 this result as



                                                                 normal/abnormal

.



[facility] R[id number] 10:50:01





             Test Item    Value        Reference Range Interpretation Comments

 

             Lymphocytes # (test code = Lymphocytes 1.8          1.0-5.5        

           



             #)                                                  



[facility] RegionalVktwgwwOCQNPVAZCS7091-79-44 10:50:01





             Test Item    Value        Reference Range Interpretation Comments

 

             Monocytes # (test code 0.9          See_Comment                [Aut

omated message] The



             = Monocytes #)                                        system which 

generated



                                                                 this result tra

nsmitted



                                                                 reference range

: <=0.8.



                                                                 The reference r

zhen was



                                                                 not used to int

erpret



                                                                 this result as



                                                                 normal/abnormal

.



[facility] R[id number] 10:50:01





             Test Item    Value        Reference Range Interpretation Comments

 

             Neutrophils # (test code = Neutrophils 6.3          1.5-8.1        

           



             #)                                                  



[facility] RegionalQzfvsyuNFLNLBNOSO3211-94-35 10:50:01





             Test Item    Value        Reference Range Interpretation Comments

 

             Eosinophils (test code = 2.0          See_Comment                [A

utomated message] The



             Eosinophils)                                        system which ge

nerated



                                                                 this result tra

nsmitted



                                                                 reference range

: <=4.0.



                                                                 The reference r

zhen was



                                                                 not used to int

erpret



                                                                 this result as



                                                                 normal/abnormal

.



[facility] RegionalTjecspdEWJSICWWDS6342-52-11 10:50:01





             Test Item    Value        Reference Range Interpretation Comments

 

             Segs (test code = Segs) 67.4         45.0-75.0                 



[facility] RegionalHebndsoJUDRMAAVTD2759-71-26 10:50:01





             Test Item    Value        Reference Range Interpretation Comments

 

             Lymphocytes (test code = Lymphocytes) 19.9         20.0-40.0       

          



[facility] Region[id number] 10:50:01





             Test Item    Value        Reference Range Interpretation Comments

 

             Basophils (test code = 1.0          See_Comment                [Aut

omated message] The



             Basophils)                                          system which ge

nerated



                                                                 this result tra

nsmitted



                                                                 reference range

: <=1.0.



                                                                 The reference r

zhen was



                                                                 not used to int

erpret



                                                                 this result as



                                                                 normal/abnormal

.



[facility] RegionalAjmmshxJBWNECQOCS9028-92-42 10:50:01





             Test Item    Value        Reference Range Interpretation Comments

 

             Monocytes (test code = Monocytes) 9.7          2.0-12.0            

      



[facility]2018-11-08 10:50:01





             Test Item    Value        Reference Range Interpretation Comments

 

             eGFR (test code = eGFR) 133                                    



[facility]2018-11-08 10:50:01





             Test Item    Value        Reference Range Interpretation Comments

 

             Calcium Lvl (test code = Calcium Lvl) 9.4          8.5-10.5        

          



[facility]2018-11-08 10:50:01





             Test Item    Value        Reference Range Interpretation Comments

 

             CO2 (test code = CO2) 28           24-32                     



[facility]2018-11-08 10:50:01





             Test Item    Value        Reference Range Interpretation Comments

 

             BUN (test code = BUN) 14           7-22                      



[facility]2018-11-08 10:50:01





             Test Item    Value        Reference Range Interpretation Comments

 

             Glucose Lvl (test code = Glucose Lvl) 89           70-99           

          



[facility]2018-11-08 10:50:01





             Test Item    Value        Reference Range Interpretation Comments

 

             Chloride Lvl (test code = Chloride Lvl) 104                  

            



[facility]2018-11-08 10:50:01





             Test Item    Value        Reference Range Interpretation Comments

 

             Potassium Lvl (test code = Potassium 4.2          3.5-5.1          

         



             Lvl)                                                



[facility]2018-11-08 10:50:01





             Test Item    Value        Reference Range Interpretation Comments

 

             Sodium Lvl (test code = Sodium Lvl) 137          135-145           

        



[facility]2018-11-08 10:50:01





             Test Item    Value        Reference Range Interpretation Comments

 

             Creatinine Lvl (test code = Creatinine 0.85         0.50-1.40      

           



             Lvl)                                                



[facility]2018-11-08 10:50:01





             Test Item    Value        Reference Range Interpretation Comments

 

             AGAP (test code = AGAP) 9.2          10.0-20.0                 



[facility] RegionalQtvvpvvFSCMYPFCDN3372-62-66 10:50:01





             Test Item    Value        Reference Range Interpretation Comments

 

             ACT (TEG) Rapid (test code = ACT (TEG) 136 s                 

           



             Rapid)                                              



[facility] RegionalAepzazdCXFTPRSSSL9326-04-54 10:50:01





             Test Item    Value        Reference Range Interpretation Comments

 

             Split Point Rapid (test code = Split 0.6 min                       

         



             Point Rapid)                                        



[facility] RegionalYprdagpABZAZLALRT5442-44-85 10:50:01





             Test Item    Value        Reference Range Interpretation Comments

 

             R-time Rapid (test code = R-time 0.9 min      0.4-0.7              

     



             Rapid)                                              



[facility] RegionalUffactaCELJFRKBVY6192-66-12 10:50:01





             Test Item    Value        Reference Range Interpretation Comments

 

             K-time Rapid (test code = K-time 1.4 min      0.6-2.3              

     



             Rapid)                                              



[facility] RegionalJcuronsQCHMWCQMDR2207-11-15 10:50:01





             Test Item    Value        Reference Range Interpretation Comments

 

             Angle Rapid (test code = Angle 71 degrees   64-80                  

   



             Rapid)                                              



[facility] RegionalIcqjmqwOTRBBRTGYR8734-20-20 10:50:01





             Test Item    Value        Reference Range Interpretation Comments

 

             G-value Rapid (test code = G-value 12.7         5.0-11.6           

       



             Rapid)                                              



[facility] RegionalDjpxisoHGTZETTZTR8927-70-31 10:50:01





             Test Item    Value        Reference Range Interpretation Comments

 

             Max Amplitude Rapid (test code = Max 72 mm        52-71            

         



             Amplitude Rapid)                                        



[facility] RegionalFecqxdnKKETDQBNPO8504-78-65 10:50:01





             Test Item    Value        Reference Range Interpretation Comments

 

             Estimated % Lysis Rapid 0.1          See_Comment                [Au

tomated message] The



             (test code = Estimated                                        syste

m which generated



             % Lysis Rapid)                                        this result t

ransmitted



                                                                 reference range

: <=7.5.



                                                                 The reference r

zhen was



                                                                 not used to int

erpret



                                                                 this result as



                                                                 normal/abnormal

.



[facility] RegionalJclzsqjUAJHMHJBOY8424-92-07 10:50:01





             Test Item    Value        Reference Range Interpretation Comments

 

             Platelet (test code = Platelet) 367          133-450               

    



[facility] RegionalPlpeynlYOMHPTGJBI2745-63-22 10:50:01





             Test Item    Value        Reference Range Interpretation Comments

 

             MPV (test code = MPV) 7.8          7.4-10.4                  



[facility] RegionalMbcuptdXTUKXTNEZD9496-40-88 10:50:01





             Test Item    Value        Reference Range Interpretation Comments

 

             MCH (test code = MCH) 27.4 pg      27.0-31.0                 



[facility] RegionalCxjsqtqAAYXXZKRWI9933-83-64 10:50:01





             Test Item    Value        Reference Range Interpretation Comments

 

             MCV (test code = MCV) 80.7         80.0-94.0                 



[facility] RegionalWunhomvUQGJCMFLFC5939-14-70 10:50:01





             Test Item    Value        Reference Range Interpretation Comments

 

             MCHC (test code = MCHC) 34.0         32.0-36.0                 



[facility]2018-05-08 05:42:00





             Test Item    Value        Reference Range Interpretation Comments

 

             B/C Ratio (test code = B/C Ratio) 17 1         6-25                

      



[facility]2018-05-08 05:42:00





             Test Item    Value        Reference Range Interpretation Comments

 

             Globulin (test code = Globulin) 4.3          2.7-4.2               

    



[facility]2018-05-08 05:42:00





             Test Item    Value        Reference Range Interpretation Comments

 

             A/G Ratio (test code = A/G Ratio) 0.7 1        0.7-1.6             

      



[facility]2018-05-08 05:42:00





             Test Item    Value        Reference Range Interpretation Comments

 

             AGAP (test code = AGAP) 14.4         10.0-20.0                 



[facility]2018-05-08 05:42:00





             Test Item    Value        Reference Range Interpretation Comments

 

             eGFR (test code = eGFR) 113                                    



[facility]2018-05-08 05:42:00





             Test Item    Value        Reference Range Interpretation Comments

 

             Alk Phos (test code = Alk Phos) 76                           

    



[facility]2018-05-08 05:42:00





             Test Item    Value        Reference Range Interpretation Comments

 

             ALT (test code = ALT) 35           See_Comment                [Auto

mated message] The



                                                                 system which ge

nerated this



                                                                 result transmit

huma



                                                                 reference range

: <=65. The



                                                                 reference range

 was not



                                                                 used to interpr

et this



                                                                 result as zayda

l/abnormal.



[facility]2018-05-08 05:42:00





             Test Item    Value        Reference Range Interpretation Comments

 

             Albumin Lvl (test code = Albumin Lvl) 2.8          3.5-5.0         

          



[facility]2018-05-08 05:42:00





             Test Item    Value        Reference Range Interpretation Comments

 

             Total Protein (test code = Total 7.1          6.4-8.4              

     



             Protein)                                            



[facility]2018-05-08 05:42:00





             Test Item    Value        Reference Range Interpretation Comments

 

             Calcium Lvl (test code = Calcium Lvl) 8.7          8.5-10.5        

          



[facility]2018-05-08 05:42:00





             Test Item    Value        Reference Range Interpretation Comments

 

             AST (test code = AST) 18           See_Comment                [Auto

mated message] The



                                                                 system which ge

nerated this



                                                                 result transmit

huma



                                                                 reference range

: <=37. The



                                                                 reference range

 was not



                                                                 used to interpr

et this



                                                                 result as zayda

l/abnormal.



[facility]2018-05-08 05:42:00





             Test Item    Value        Reference Range Interpretation Comments

 

             Bili Total (test code = Bili Total) 0.3          0.2-1.3           

        



[facility]2018-05-08 05:42:00





             Test Item    Value        Reference Range Interpretation Comments

 

             Potassium Lvl (test code = Potassium 4.4          3.5-5.1          

         



             Lvl)                                                



[facility]2018-05-08 05:42:00





             Test Item    Value        Reference Range Interpretation Comments

 

             Chloride Lvl (test code = Chloride Lvl) 109                  

            



[facility]2018-05-08 05:42:00





             Test Item    Value        Reference Range Interpretation Comments

 

             CO2 (test code = CO2) 23           24-32                     



[facility]2018-05-08 05:42:00





             Test Item    Value        Reference Range Interpretation Comments

 

             Glucose Lvl (test code = Glucose Lvl) 114          70-99           

          



[facility]2018-05-08 05:42:00





             Test Item    Value        Reference Range Interpretation Comments

 

             Creatinine Lvl (test code = Creatinine 1.01         0.50-1.40      

           



             Lvl)                                                



[facility]2018-05-08 05:42:00





             Test Item    Value        Reference Range Interpretation Comments

 

             BUN (test code = BUN) 17           7-22                      



[facility]2018-05-08 05:42:00





             Test Item    Value        Reference Range Interpretation Comments

 

             Sodium Lvl (test code = Sodium Lvl) 142          135-145           

        



[facility] RegionalTcgiownZBYXHHBYJJ9670-66-69 05:42:00





             Test Item    Value        Reference Range Interpretation Comments

 

             Basophils (test code = 0.6          See_Comment                [Aut

omated message] The



             Basophils)                                          system which ge

nerated



                                                                 this result tra

nsmitted



                                                                 reference range

: <=1.0.



                                                                 The reference r

zhen was



                                                                 not used to int

erpret



                                                                 this result as



                                                                 normal/abnormal

.



[facility] RegionalGxmjtwgPTHLTPJUZH4592-73-74 05:42:00





             Test Item    Value        Reference Range Interpretation Comments

 

             Segs-Bands # (test code = Segs-Bands #) 4.8          1.5-8.1       

            



[facility] RegionalUnghwemOWLRYKHEUN0539-83-62 05:42:00





             Test Item    Value        Reference Range Interpretation Comments

 

             Monocytes # (test code 0.7          See_Comment                [Aut

omated message] The



             = Monocytes #)                                        system which 

generated



                                                                 this result tra

nsmitted



                                                                 reference range

: <=0.8.



                                                                 The reference r

zhen was



                                                                 not used to int

erpret



                                                                 this result as



                                                                 normal/abnormal

.



[facility] RegionalYgfknevVJCWNIEQTO2327-07-60 05:42:00





             Test Item    Value        Reference Range Interpretation Comments

 

             Lymphocytes # (test code = Lymphocytes 1.9          1.0-5.5        

           



             #)                                                  



[facility] RegionalLfqcuajETMSDCHYHL0196-43-13 05:42:00





             Test Item    Value        Reference Range Interpretation Comments

 

             Monocytes (test code = Monocytes) 9.4          2.0-12.0            

      



[facility] RegionalXdhmgjcIINNIJVBIY3994-91-48 05:42:00





             Test Item    Value        Reference Range Interpretation Comments

 

             Eosinophils # (test code 0.2          See_Comment                [A

utomated message] The



             = Eosinophils #)                                        system whic

h generated



                                                                 this result tra

nsmitted



                                                                 reference range

: <=0.5.



                                                                 The reference r

zhen was



                                                                 not used to int

erpret



                                                                 this result as



                                                                 normal/abnormal

.



[facility] RegionalKbxdlahRZMPTWRXMB1911-27-75 05:42:00





             Test Item    Value        Reference Range Interpretation Comments

 

             Eosinophils (test code = 2.9          See_Comment                [A

utomated message] The



             Eosinophils)                                        system which ge

nerated



                                                                 this result tra

nsmitted



                                                                 reference range

: <=4.0.



                                                                 The reference r

zhen was



                                                                 not used to int

erpret



                                                                 this result as



                                                                 normal/abnormal

.



[facility] RegionalXvxbxcqPWCNGIBSVA9040-89-40 05:42:00





             Test Item    Value        Reference Range Interpretation Comments

 

             Segs (test code = Segs) 62.2         45.0-75.0                 



[facility] RegionalEtrndvjLVAGOOODME3888-28-76 05:42:00





             Test Item    Value        Reference Range Interpretation Comments

 

             Lymphocytes (test code = Lymphocytes) 24.9         20.0-40.0       

          



[facility] RegionalXkqfmuxRBWPCPOLXB2231-74-71 05:42:00





             Test Item    Value        Reference Range Interpretation Comments

 

             MCH (test code = MCH) 27.5 pg      27.0-31.0                 



[facility] RegionalVkhmcdqZVJWVYYHJB6512-34-56 05:42:00





             Test Item    Value        Reference Range Interpretation Comments

 

             MCV (test code = MCV) 85.3         80.0-94.0                 



[facility] RegionalMrazjrnCMNHWHTXST4589-87-98 05:42:00





             Test Item    Value        Reference Range Interpretation Comments

 

             Hct (test code = Hct) 43.9         42.0-54.0                 



[facility] RegionalRgggnlyKDYIYAOJVS9732-06-65 05:42:00





             Test Item    Value        Reference Range Interpretation Comments

 

             Hgb (test code = Hgb) 14.2         14.0-18.0                 



[facility] RegionalLrtwqefFGJMRMVHTJ7820-71-73 05:42:00





             Test Item    Value        Reference Range Interpretation Comments

 

             WBC (test code = WBC) 7.7          3.7-10.4                  



[facility] RegionalHkerxsxUYPRUSHNFB2723-55-57 05:42:00





             Test Item    Value        Reference Range Interpretation Comments

 

             RBC (test code = RBC) 5.15         4.70-6.10                 



[facility] RegionalJwttgezFWRXCUFROA1078-25-87 05:42:00





             Test Item    Value        Reference Range Interpretation Comments

 

             MPV (test code = MPV) 8.4          7.4-10.4                  



[facility] RegionalQzodpvcLIDATQYCIU1305-01-31 05:42:00





             Test Item    Value        Reference Range Interpretation Comments

 

             MCHC (test code = MCHC) 32.3         32.0-36.0                 



[facility] RegionalQrzopsqLSBLHYRHHO0332-58-00 05:42:00





             Test Item    Value        Reference Range Interpretation Comments

 

             RDW (test code = RDW) 17.3         11.5-14.5                 



[facility] RegionalOfgyhffZIWDJRHABC2070-25-73 05:42:00





             Test Item    Value        Reference Range Interpretation Comments

 

             Platelet (test code = Platelet) 317          133-450               

    



[facility]2018-05-07 09:36:00





             Test Item    Value        Reference Range Interpretation Comments

 

             Globulin (test code = Globulin) 4.4          2.7-4.2               

    



[facility]2018-05-07 09:36:00





             Test Item    Value        Reference Range Interpretation Comments

 

             A/G Ratio (test code = A/G Ratio) 0.6 1        0.7-1.6             

      



[facility]2018-05-07 09:36:00





             Test Item    Value        Reference Range Interpretation Comments

 

             B/C Ratio (test code = B/C Ratio) 17 1         6-25                

      



[facility]2018-05-07 09:36:00





             Test Item    Value        Reference Range Interpretation Comments

 

             AGAP (test code = AGAP) 11.3         10.0-20.0                 



[facility]2018-05-07 09:36:00





             Test Item    Value        Reference Range Interpretation Comments

 

             eGFR (test code = eGFR) 134                                    



[facility]2018-05-07 09:36:00





             Test Item    Value        Reference Range Interpretation Comments

 

             Creatinine Lvl (test code = Creatinine 0.84         0.50-1.40      

           



             Lvl)                                                



[facility]2018-05-07 09:36:00





             Test Item    Value        Reference Range Interpretation Comments

 

             Sodium Lvl (test code = Sodium Lvl) 142          135-145           

        



[facility]2018-05-07 09:36:00





             Test Item    Value        Reference Range Interpretation Comments

 

             Glucose Lvl (test code = Glucose Lvl) 99           70-99           

          



[facility]2018-05-07 09:36:00





             Test Item    Value        Reference Range Interpretation Comments

 

             BUN (test code = BUN) 14           7-22                      



[facility]2018-05-07 09:36:00





             Test Item    Value        Reference Range Interpretation Comments

 

             Alk Phos (test code = Alk Phos) 79                           

    



[facility]2018-05-07 09:36:00





             Test Item    Value        Reference Range Interpretation Comments

 

             Bili Total (test code = Bili Total) 0.3          0.2-1.3           

        



[facility]2018-05-07 09:36:00





             Test Item    Value        Reference Range Interpretation Comments

 

             AST (test code = AST) 14           See_Comment                [Auto

mated message] The



                                                                 system which ge

nerated this



                                                                 result transmit

huma



                                                                 reference range

: <=37. The



                                                                 reference range

 was not



                                                                 used to interpr

et this



                                                                 result as zayda

l/abnormal.



[place] [id number] 09:36:00





             Test Item    Value        Reference Range Interpretation Comments

 

             ALT (test code = ALT) 43           See_Comment                [Auto

mated message] The



                                                                 system which ge

nerated this



                                                                 result transmit

huma



                                                                 reference range

: <=65. The



                                                                 reference range

 was not



                                                                 used to interpr

et this



                                                                 result as zayda

l/abnormal.



[facility]2018-05-07 09:36:00





             Test Item    Value        Reference Range Interpretation Comments

 

             Total Protein (test code = Total 7.1          6.4-8.4              

     



             Protein)                                            



[place] 963848-05-07 09:36:00





             Test Item    Value        Reference Range Interpretation Comments

 

             Albumin Lvl (test code = Albumin Lvl) 2.7          3.5-5.0         

          



[place] 963848-05-07 09:36:00





             Test Item    Value        Reference Range Interpretation Comments

 

             Calcium Lvl (test code = Calcium Lvl) 9.2          8.5-10.5        

          



[facility]2018-05-07 09:36:00





             Test Item    Value        Reference Range Interpretation Comments

 

             CO2 (test code = CO2) 21           24-32                     



[place] 963848-05-07 09:36:00





             Test Item    Value        Reference Range Interpretation Comments

 

             Potassium Lvl (test code = Potassium 4.3          3.5-5.1          

         



             Lvl)                                                



[facility]2018-05-07 09:36:00





             Test Item    Value        Reference Range Interpretation Comments

 

             Chloride Lvl (test code = Chloride Lvl) 114                  

            



[facility] RegionalWhmzbprEIKKGJRLRR1066-48-84 09:36:00





             Test Item    Value        Reference Range Interpretation Comments

 

             MCHC (test code = MCHC) 32.5         32.0-36.0                 



[place] 055148-05-07 09:36:00





             Test Item    Value        Reference Range Interpretation Comments

 

             RDW (test code = RDW) 17.5         11.5-14.5                 



[facility] RegionalKjlbrjaCYBIMUFKOG6478-80-61 09:36:00





             Test Item    Value        Reference Range Interpretation Comments

 

             Platelet (test code = Platelet) 400          133-450               

    



[facility] RegionalDdnygbyIUESMDWHJY8103-96-04 09:36:00





             Test Item    Value        Reference Range Interpretation Comments

 

             MPV (test code = MPV) 8.5          7.4-10.4                  



[facility] RegionalTbzgzfaWYZBKHNROI1542-09-68 09:36:00





             Test Item    Value        Reference Range Interpretation Comments

 

             WBC (test code = WBC) 6.6          3.7-10.4                  



[facility] RegionalUufxwuzTWEHFJJEQJ3154-43-85 09:36:00





             Test Item    Value        Reference Range Interpretation Comments

 

             RBC (test code = RBC) 5.17         4.70-6.10                 



[facility] RegionalSnjawjoWPHSENTCZV5620-75-89 09:36:00





             Test Item    Value        Reference Range Interpretation Comments

 

             MCV (test code = MCV) 86.1         80.0-94.0                 



[facility] RegionalGdfvntkFZYZYQIQSP6592-26-12 09:36:00





             Test Item    Value        Reference Range Interpretation Comments

 

             Hct (test code = Hct) 44.5         42.0-54.0                 



[facility] RegionalMzhmlkeGLXWOFOCGS2501-85-99 09:36:00





             Test Item    Value        Reference Range Interpretation Comments

 

             MCH (test code = MCH) 28.0 pg      27.0-31.0                 



[facility] RegionalQeglhumNEQPXGLKGY8414-54-54 09:36:00





             Test Item    Value        Reference Range Interpretation Comments

 

             Hgb (test code = Hgb) 14.5         14.0-18.0                 



[facility] RegionalUqnqyemXKEPQUSNEV4665-96-94 09:36:00





             Test Item    Value        Reference Range Interpretation Comments

 

             Lymphocytes # (test code = Lymphocytes 1.7          1.0-5.5        

           



             #)                                                  



[facility] RegionalCphduihCTVFTWROEG2650-19-89 09:36:00





             Test Item    Value        Reference Range Interpretation Comments

 

             Monocytes # (test code 0.7          See_Comment                [Aut

omated message] The



             = Monocytes #)                                        system which 

generated



                                                                 this result tra

nsmitted



                                                                 reference range

: <=0.8.



                                                                 The reference r

zhen was



                                                                 not used to int

erpret



                                                                 this result as



                                                                 normal/abnormal

.



[facility] RegionalOymecyoPQCNUYJKIY6917-07-46 09:36:00





             Test Item    Value        Reference Range Interpretation Comments

 

             Eosinophils # (test code 0.2          See_Comment                [A

utomated message] The



             = Eosinophils #)                                        system whic

h generated



                                                                 this result tra

nsmitted



                                                                 reference range

: <=0.5.



                                                                 The reference r

zhen was



                                                                 not used to int

erpret



                                                                 this result as



                                                                 normal/abnormal

.



[facility] RegionalVjywdbbCAOXKIZPMR9726-04-74 09:36:00





             Test Item    Value        Reference Range Interpretation Comments

 

             Lymphocytes (test code = Lymphocytes) 26.1         20.0-40.0       

          



[facility] RegionalHoihpazZTPMSARSYH3212-67-37 09:36:00





             Test Item    Value        Reference Range Interpretation Comments

 

             Segs (test code = Segs) 59.3         45.0-75.0                 



[facility] RegionalIyggbriCKSVHQRQZH6841-88-43 09:36:00





             Test Item    Value        Reference Range Interpretation Comments

 

             Basophils (test code = 0.8          See_Comment                [Aut

omated message] The



             Basophils)                                          system which ge

nerated



                                                                 this result tra

nsmitted



                                                                 reference range

: <=1.0.



                                                                 The reference r

zhen was



                                                                 not used to int

erpret



                                                                 this result as



                                                                 normal/abnormal

.



[facility] RegionalBicqutjOERDCDEOEP8743-37-61 09:36:00





             Test Item    Value        Reference Range Interpretation Comments

 

             Monocytes (test code = Monocytes) 10.5         2.0-12.0            

      



[facility] RegionalSmejwzhYJIUIVQKUS1697-24-19 09:36:00





             Test Item    Value        Reference Range Interpretation Comments

 

             Eosinophils (test code = 3.3          See_Comment                [A

utomated message] The



             Eosinophils)                                        system which ge

nerated



                                                                 this result tra

nsmitted



                                                                 reference range

: <=4.0.



                                                                 The reference r

zhen was



                                                                 not used to int

erpret



                                                                 this result as



                                                                 normal/abnormal

.



[facility] RegionalWffteltZCJZDMEPWH3456-34-01 09:36:00





             Test Item    Value        Reference Range Interpretation Comments

 

             Segs-Bands # (test code = Segs-Bands #) 3.9          1.5-8.1       

            



[facility]QjryyklDNRXCSUDAX8729-52-07 21:02:00





             Test Item    Value        Reference Range Interpretation Comments

 

             Vanco Tr TND (test code = Vanco Tr 15:30pm                         

       



             TND)                                                



[facility]LTKANIFONZ4886[facility]62[facility]:02:00





             Test Item    Value        Reference Range Interpretation Comments

 

             Vanco Tr (test code = Vanco Tr) 9.1                                

    



[facility]2018-05-06 07:07:00





             Test Item    Value        Reference Range Interpretation Comments

 

             Glucose Lvl (test code = Glucose Lvl) 74           70-99           

          



[place] 963848-05-06 07:07:00





             Test Item    Value        Reference Range Interpretation Comments

 

             BUN (test code = BUN) 12           7-22                      



[place] 963848-05-06 07:07:00





             Test Item    Value        Reference Range Interpretation Comments

 

             Creatinine Lvl (test code = Creatinine 0.75         0.50-1.40      

           



             Lvl)                                                



[place] 963848-05-06 07:07:00





             Test Item    Value        Reference Range Interpretation Comments

 

             eGFR (test code = eGFR) 140                                    



[place] 963848-05-06 07:07:00





             Test Item    Value        Reference Range Interpretation Comments

 

             Albumin Lvl (test code = Albumin Lvl) 2.9          3.5-5.0         

          



[place] 963848-05-06 07:07:00





             Test Item    Value        Reference Range Interpretation Comments

 

             Globulin (test code = Globulin) 4.4          2.7-4.2               

    



[place] 963848-05-06 07:07:00





             Test Item    Value        Reference Range Interpretation Comments

 

             A/G Ratio (test code = A/G Ratio) 0.7 1        0.7-1.6             

      



[place] 963848-05-06 07:07:00





             Test Item    Value        Reference Range Interpretation Comments

 

             Bili Total (test code = Bili Total) 0.4          0.2-1.3           

        



[place] 963848-05-06 07:07:00





             Test Item    Value        Reference Range Interpretation Comments

 

             Alk Phos (test code = Alk Phos) 71                           

    



[place] 963848-05-06 07:07:00





             Test Item    Value        Reference Range Interpretation Comments

 

             AST (test code = AST) 15           See_Comment                [Auto

mated message] The



                                                                 system which ge

nerated this



                                                                 result transmit

huma



                                                                 reference range

: <=37. The



                                                                 reference range

 was not



                                                                 used to interpr

et this



                                                                 result as zayda

l/abnormal.



[place] 963848-05-06 07:07:00





             Test Item    Value        Reference Range Interpretation Comments

 

             ALT (test code = ALT) 28           See_Comment                [Auto

mated message] The



                                                                 system which ge

nerated this



                                                                 result transmit

huma



                                                                 reference range

: <=65. The



                                                                 reference range

 was not



                                                                 used to interpr

et this



                                                                 result as zayda

l/abnormal.



[place] 963848-05-06 07:07:00





             Test Item    Value        Reference Range Interpretation Comments

 

             Potassium Lvl (test code = Potassium 4.4          3.5-5.1          

         



             Lvl)                                                



[facility]2018-05-06 07:07:00





             Test Item    Value        Reference Range Interpretation Comments

 

             Sodium Lvl (test code = Sodium Lvl) 147          135-145           

        



[facility]2018-05-06 07:07:00





             Test Item    Value        Reference Range Interpretation Comments

 

             CO2 (test code = CO2) 25           24-32                     



[facility]2018-05-06 07:07:00





             Test Item    Value        Reference Range Interpretation Comments

 

             Chloride Lvl (test code = Chloride Lvl) 114                  

            



[facility]2018-05-06 07:07:00





             Test Item    Value        Reference Range Interpretation Comments

 

             B/C Ratio (test code = B/C Ratio) 16 1         6-25                

      



[place] 963848-05-06 07:07:00





             Test Item    Value        Reference Range Interpretation Comments

 

             Calcium Lvl (test code = Calcium Lvl) 8.7          8.5-10.5        

          



[facility]2018-05-06 07:07:00





             Test Item    Value        Reference Range Interpretation Comments

 

             AGAP (test code = AGAP) 12.4         10.0-20.0                 



[facility]2018-05-06 07:07:00





             Test Item    Value        Reference Range Interpretation Comments

 

             Total Protein (test code = Total 7.3          6.4-8.4              

     



             Protein)                                            



[facility] RegionalRctfazdCXHRUWJPNZ3660-44-02 07:07:00





             Test Item    Value        Reference Range Interpretation Comments

 

             Platelet (test code = Platelet) 345          133-450               

    



[facility] RegionalJuqyffoAUQGSGEZSD7872-04-02 07:07:00





             Test Item    Value        Reference Range Interpretation Comments

 

             MPV (test code = MPV) 8.7          7.4-10.4                  



[facility] RegionalBkfulplSJVWCMJCBW2468-72-06 07:07:00





             Test Item    Value        Reference Range Interpretation Comments

 

             WBC (test code = WBC) 6.9          3.7-10.4                  



[facility] RegionalYxzedhvEMXIZCLZDY6686-18-48 07:07:00





             Test Item    Value        Reference Range Interpretation Comments

 

             RBC (test code = RBC) 5.30         4.70-6.10                 



[place] 055148-05-06 07:07:00





             Test Item    Value        Reference Range Interpretation Comments

 

             MCHC (test code = MCHC) 32.8         32.0-36.0                 



[facility] RegionalSnrlxypPFUZVZQTJQ2513-46-16 07:07:00





             Test Item    Value        Reference Range Interpretation Comments

 

             MCH (test code = MCH) 27.9 pg      27.0-31.0                 



[facility] RegionalYrnldheJGOQESFNCY6009-00-27 07:07:00





             Test Item    Value        Reference Range Interpretation Comments

 

             Hgb (test code = Hgb) 14.8         14.0-18.0                 



[facility] RegionalTkvjmjjBTNKNOEAHJ8926-50-74 07:07:00





             Test Item    Value        Reference Range Interpretation Comments

 

             MCV (test code = MCV) 85.0         80.0-94.0                 



[facility] RegionalUhmbajsXBGXGMGVIP1569-27-49 07:07:00





             Test Item    Value        Reference Range Interpretation Comments

 

             Hct (test code = Hct) 45.1         42.0-54.0                 



[facility] RegionalFkqeewrYUQSUGVMVC2643-69-43 07:07:00





             Test Item    Value        Reference Range Interpretation Comments

 

             RDW (test code = RDW) 17.5         11.5-14.5                 



[facility] RegionalBhcuocyWCMWHEZINL2757-74-28 07:07:00





             Test Item    Value        Reference Range Interpretation Comments

 

             Eosinophils # (test code 0.2          See_Comment                [A

utomated message] The



             = Eosinophils #)                                        system whic

h generated



                                                                 this result tra

nsmitted



                                                                 reference range

: <=0.5.



                                                                 The reference r

zhen was



                                                                 not used to int

erpret



                                                                 this result as



                                                                 normal/abnormal

.



[facility] RegionalXafvbsiQULGMUIJIR2395-72-61 07:07:00





             Test Item    Value        Reference Range Interpretation Comments

 

             Lymphocytes # (test code = Lymphocytes 2.0          1.0-5.5        

           



             #)                                                  



[facility] RegionalWkmcedzTDVULKOIKR5381-44-24 07:07:00





             Test Item    Value        Reference Range Interpretation Comments

 

             Monocytes # (test code 0.7          See_Comment                [Aut

omated message] The



             = Monocytes #)                                        system which 

generated



                                                                 this result tra

nsmitted



                                                                 reference range

: <=0.8.



                                                                 The reference r

zhen was



                                                                 not used to int

erpret



                                                                 this result as



                                                                 normal/abnormal

.



[facility] RegionalHofflhaWUMASGBFSR2328-40-91 07:07:00





             Test Item    Value        Reference Range Interpretation Comments

 

             Eosinophils (test code = 2.4          See_Comment                [A

utomated message] The



             Eosinophils)                                        system which ge

nerated



                                                                 this result tra

nsmitted



                                                                 reference range

: <=4.0.



                                                                 The reference r

zhen was



                                                                 not used to int

erpret



                                                                 this result as



                                                                 normal/abnormal

.



[facility] RegionalWjrfatrQXTHMFDELB5669-30-31 07:07:00





             Test Item    Value        Reference Range Interpretation Comments

 

             Basophils (test code = 0.6          See_Comment                [Aut

omated message] The



             Basophils)                                          system which ge

nerated



                                                                 this result tra

nsmitted



                                                                 reference range

: <=1.0.



                                                                 The reference r

zhen was



                                                                 not used to int

erpret



                                                                 this result as



                                                                 normal/abnormal

.



[facility] RegionalRsztooiQTJJEXBPZG6494-84-02 07:07:00





             Test Item    Value        Reference Range Interpretation Comments

 

             Segs-Bands # (test code = Segs-Bands #) 4.0          1.5-8.1       

            



[facility] RegionalAwspyqzZYCNVVWJLH0466-42-81 07:07:00





             Test Item    Value        Reference Range Interpretation Comments

 

             Monocytes (test code = Monocytes) 10.4         2.0-12.0            

      



[facility] RegionalIgrkrmkPJSNHHCGYE0592-10-41 07:07:00





             Test Item    Value        Reference Range Interpretation Comments

 

             RBC Morph (test code = Normal (18 2:07 AM)                     

      



             RBC Morph)                                          



[facility] RegionalTwwzadwLIBKOURFLP0485-97-61 07:07:00





             Test Item    Value        Reference Range Interpretation Comments

 

             Segs (test code = Segs) 58.1         45.0-75.0                 



[facility] RegionalSsrvqkjJFQLMCJEYH2333-62-57 07:07:00





             Test Item    Value        Reference Range Interpretation Comments

 

             Plt Morph (test code = Normal (18 2:07 AM)                     

      



             Plt Morph)                                          



[facility] RegionalJemdrmuWXHHBJTYZI1670-36-24 07:07:00





             Test Item    Value        Reference Range Interpretation Comments

 

             Lymphocytes (test code = Lymphocytes) 28.5         20.0-40.0       

          



[facility] RegionalHxpkhgvPTGNHFQPWH2328-25-90 16:07:00





             Test Item    Value        Reference Range Interpretation Comments

 

             Basophils # (test code 0.1          See_Comment                [Aut

omated message] The



             = Basophils #)                                        system which 

generated



                                                                 this result tra

nsmitted



                                                                 reference range

: <=0.2.



                                                                 The reference r

zhen was



                                                                 not used to int

erpret



                                                                 this result as



                                                                 normal/abnormal

.



[facility] RegionalRmmaokrZTMEWHMHWZ6845-47-16 16:07:00





             Test Item    Value        Reference Range Interpretation Comments

 

             Polychrom (test code = Moderate *ABN*(18                       

    



             Polychrom)   11:07 AM)                              



[facility] HospitalXnodubtKASKTZKERA9697-92-31 06:43:00





             Test Item    Value        Reference Range Interpretation Comments

 

             Vanco Tr TND (test code = Vanco Tr TND) *                          

            



[facility] HospitalGeovwzqMACHDJESWO5635-86-61 06:43:00





             Test Item    Value        Reference Range Interpretation Comments

 

             Vanco Tr (test code = Vanco Tr) 22.3                               

    



[facility]2018-05-04 11:45:00





             Test Item    Value        Reference Range Interpretation Comments

 

             Magnesium Lvl (test code = Magnesium 2.3          1.8-2.4          

         



             Lvl)                                                



[facility]2018-05-04 11:45:00





             Test Item    Value        Reference Range Interpretation Comments

 

             Phosphorus (test code = Phosphorus) 3.5          2.5-4.5           

        



[facility] RegionalDxsydiiCRLRMVWQLA6235-33-50 11:45:00





             Test Item    Value        Reference Range Interpretation Comments

 

             PT (test code = PT) 14.0 s       12.0-14.7                 



[facility] RegionalYgayjuaBCTFBTICBH6677-70-87 11:45:00





             Test Item    Value        Reference Range Interpretation Comments

 

             PTT (test code = PTT) 37.6 s       22.9-35.8                 



[facility] RegionalZkkbkbrZNLKDDSAFN4767-94-84 11:45:00





             Test Item    Value        Reference Range Interpretation Comments

 

             INR (test code = INR) 1.08 1       0.85-1.17                 



[facility]GmgbrxnRBFSTBLOIZ9110-07-29 11:45:00





             Test Item    Value        Reference Range Interpretation Comments

 

             C-REACTIVE PROTEIN (test code = 13.1                               

    



             C-REACTIVE PROTEIN)                                        



[facility] SystemWgkrremOGAGSRGQKF6689-78-53 11:45:00





             Test Item    Value        Reference Range Interpretation Comments

 

             Prealbumin (test code = Prealbumin) 25.2         18.0-45.0         

        



[facility]2018-05-04 03:06:00





             Test Item    Value        Reference Range Interpretation Comments

 

             Lactic Acid Lvl (test code = Lactic 0.9          0.5-2.2           

        



             Acid Lvl)                                           



[facility] RegionalTaqrrxhBAGZTYEMVZ4019-49-09 03:06:00





             Test Item    Value        Reference Range Interpretation Comments

 

             Sed Rate (test code = 5            See_Comment                [Auto

mated message] The



             Sed Rate)                                           system which ge

nerated this



                                                                 result transmit

huma



                                                                 reference range

: <=15. The



                                                                 reference range

 was not



                                                                 used to interpr

et this



                                                                 result as zayda

l/abnormal.



[facility] SystemRveohksHTDNVGVEIH4343-35-67 03:06:00





             Test Item    Value        Reference Range Interpretation Comments

 

             C-REACTIVE PROTEIN (test code = 15.8                               

    



             C-REACTIVE PROTEIN)                                        



[facility] [id number] 00:44:00





             Test Item    Value        Reference Range Interpretation Comments

 

             PT (test code = PT) 13.0 s       12.0-14.7                 



[facility] Region[id number] 00:44:00





             Test Item    Value        Reference Range Interpretation Comments

 

             INR (test code = INR) 0.98 1       0.85-1.17                 



[facility] RegionalZwrfwmzMJNKCOOOGN5097-12-01 00:44:00





             Test Item    Value        Reference Range Interpretation Comments

 

             PTT (test code = PTT) 33.2 s       22.9-35.8                 



[facility] [id number] 23:51:00





             Test Item    Value        Reference Range Interpretation Comments

 

             Antibody Scrn (test Negative (5/3/18 6:51                          

 



             code = Antibody Scrn) PM)                                    



[facility] [id number] 23:51:00





             Test Item    Value        Reference Range Interpretation Comments

 

             ABO/Rh (test code = ABO/Rh) AB POS                                 



[facility] AND [id number] 23:35:00





             Test Item    Value        Reference Range Interpretation Comments

 

             UA Urobilinogen (test code = UA 0.2          0.1-1.0               

    



             Urobilinogen)                                        



[facility] AND [id number] 23:35:00





             Test Item    Value        Reference Range Interpretation Comments

 

             UA Nitrite (test code Negative (5/3/18 6:35                        

   



             = UA Nitrite) PM)                                    



[facility] AND [id number] 23:35:00





             Test Item    Value        Reference Range Interpretation Comments

 

             UA Glucose (test code Negative (5/3/18 6:35                        

   



             = UA Glucose) PM)                                    



[facility] AND [id number] 23:35:00





             Test Item    Value        Reference Range Interpretation Comments

 

             UA Ketones (test code Negative *NA*(5/3/18                         

  



             = UA Ketones) 6:35 PM)                               



[facility] AND [id number] 23:35:00





             Test Item    Value        Reference Range Interpretation Comments

 

             UA Bili (test code = Negative *NA*(5/3/18                          

 



             UA Bili)     6:35 PM)                               



[facility] AND [id number] 23:35:00





             Test Item    Value        Reference Range Interpretation Comments

 

             UA Blood (test code = Trace *ABN*(5/3/18                           



             UA Blood)    6:35 PM)                               



[facility] [id number] 23:35:00





             Test Item    Value        Reference Range Interpretation Comments

 

             UA Leuk Est (test code Small *ABN*(5/3/18 6:35                     

      



             = UA Leuk Est) PM)                                    



[facility] [id number] 23:35:00





             Test Item    Value        Reference Range Interpretation Comments

 

             UA Spec Grav (test code = UA Spec 1.020 1                          

      



             Grav)                                               



[facility] [id number] 23:35:00





             Test Item    Value        Reference Range Interpretation Comments

 

             UA pH (test code = UA pH) 6.0 1        5.0-8.0                   



[facility] [id number] 23:35:00





             Test Item    Value        Reference Range Interpretation Comments

 

             UA Color (test code = Yellow *NA*(5/3/18 6:35                      

     



             UA Color)    PM)                                    



[facility] [id number] 23:35:00





             Test Item    Value        Reference Range Interpretation Comments

 

             UA Protein (test code = Trace *ABN*(5/3/18                         

  



             UA Protein)  6:35 PM)                               



[facility] [id number] 23:35:00





             Test Item    Value        Reference Range Interpretation Comments

 

             UA Turbidity (test code Slight Cloudy (5/3/18                      

     



             = UA Turbidity) 6:35 PM)                               



[facility] [id number] 23:35:00





             Test Item    Value        Reference Range Interpretation Comments

 

             UA Hyal Cast 0-2 (5/3/18 6:35 See_Comment                [Automated

 message]



             (test code = UA PM)                                    The system w

[facility]



             Hyal Cast)                                          generated this 

result



                                                                 transmitted ref

erence



                                                                 range: <=2. The



                                                                 reference range

 was



                                                                 not used to int

erpret



                                                                 this result as



                                                                 normal/abnormal

.



[facility] AND [id number] 23:35:00





             Test Item    Value        Reference Range Interpretation Comments

 

             UA Bacteria (test code = UA Occasional /HPF                        

   



             Bacteria)                                           



[facility] [id number] 23:35:00





             Test Item    Value        Reference Range Interpretation Comments

 

             UA RBC (test code 11-20 /HPF   See_Comment                [Automate

d message] The



             = UA RBC)                                           system which ge

nerated



                                                                 this result tra

nsmitted



                                                                 reference range

: <=2. The



                                                                 reference range

 was not



                                                                 used to interpr

et this



                                                                 result as



                                                                 normal/abnormal

.



[facility] [id number] 23:35:00





             Test Item    Value        Reference Range Interpretation Comments

 

             UA Sq Epi (test code = UA Sq Occasional /LPF                       

    



             Epi)                                                



[facility] AND [id number] 23:35:00





             Test Item    Value        Reference Range Interpretation Comments

 

             UA WBC (test code = UA WBC)  /HPF                            



[id number] 23:20:00





             Test Item    Value        Reference Range Interpretation Comments

 

             Basophils # (test code 0.1          See_Comment                [Aut

omated message] The



             = Basophils #)                                        system which 

generated



                                                                 this result tra

nsmitted



                                                                 reference range

: <=0.2.



                                                                 The reference r

zhen was



                                                                 not used to int

erpret



                                                                 this result as



                                                                 normal/abnormal

.



[facility] [id number] 23:20:00





             Test Item    Value        Reference Range Interpretation Comments

 

             Polychrom (test code = Polychrom) Slight                           

      



[facility] Reg[id number] 23:20:00





             Test Item    Value        Reference Range Interpretation Comments

 

             Plt Morph (test code = Normal (5/3/18 6:20 PM)                     

      



             Plt Morph)                                          



[facility] [id number] 16:22:00





             Test Item    Value        Reference Range Interpretation Comments

 

             CULTURE (BEAKER) (test No growth in 5 days                         

  



             code = 1095)                                        



BLOOD [id number] 16:22:00





             Test Item    Value        Reference Range Interpretation Comments

 

             CULTURE (BEAKER) (test No growth in 5 days                         

  



             code = 1095)                                        



(MANUAL DIFFERENTIAL)2017 22:29:00





             Test Item    Value        Reference Range Interpretation Comments

 

             TOTAL COUNTED (BEAKER) (test code =                                

        



             1351)                                               

 

             WBC MORPHOLOGY (BEAKER) (test code = Normal                        

         



             487)                                                

 

             PLT MORPHOLOGY (BEAKER) (test code = Normal                        

         



             486)                                                

 

             RBC MORPHOLOGY (BEAKER) (test code = Normal                        

         



             762)                                                



CBC W/PLT COUNT &amp; AUTO [id number] 22:28:00





             Test Item    Value        Reference Range Interpretation Comments

 

             WHITE BLOOD CELL COUNT (BEAKER) 7.3 K/ L     4.0-10.0              

    



             (test code = 775)                                        

 

             RED BLOOD CELL COUNT (BEAKER) 5.09 M/ L    4.20-5.80               

  



             (test code = 761)                                        

 

             HEMOGLOBIN (BEAKER) (test code = 13.0 GM/DL   13.0-16.8            

     



             410)                                                

 

             HEMATOCRIT (BEAKER) (test code = 42.3 %       40.0-50.0            

     



             411)                                                

 

             MEAN CORPUSCULAR VOLUME (BEAKER) 83.0 fL      82.0-98.0            

     



             (test code = 753)                                        

 

             MEAN CORPUSCULAR HEMOGLOBIN 25.6 pg      27.0-33.0    L            



             (BEAKER) (test code = 751)                                        

 

             MEAN CORPUSCULAR HEMOGLOBIN CONC 30.8 GM/DL   32.0-36.0    L       

     



             (BEAKER) (test code = 752)                                        

 

             RED CELL DISTRIBUTION WIDTH 18.0 %       10.3-14.2    H            



             (BEAKER) (test code = 412)                                        

 

             PLATELET COUNT (BEAKER) (test 331 K/CU MM  150-430                 

  



             code = 756)                                         

 

             MEAN PLATELET VOLUME (BEAKER) 8.5 fL       6.5-10.5                

  



             (test code = 754)                                        

 

             NUCLEATED RED BLOOD CELLS 0 /100 WBC   0-0                       



             (BEAKER) (test code = 413)                                        

 

             NEUTROPHILS RELATIVE PERCENT 65 %                                  

 



             (BEAKER) (test code = 429)                                        

 

             LYMPHOCYTES RELATIVE PERCENT 23 %                                  

 



             (BEAKER) (test code = 430)                                        

 

             MONOCYTES RELATIVE PERCENT 8 %                                    



             (BEAKER) (test code = 431)                                        

 

             EOSINOPHILS RELATIVE PERCENT 3 %                                   

 



             (BEAKER) (test code = 432)                                        

 

             BASOPHILS RELATIVE PERCENT 1 %                                    



             (BEAKER) (test code = 437)                                        

 

             NEUTROPHILS ABSOLUTE COUNT 4.75 K/ L    1.80-8.00                 



             (BEAKER) (test code = 670)                                        

 

             LYMPHOCYTES ABSOLUTE COUNT 1.68 K/ L    1.48-4.50                 



             (BEAKER) (test code = 414)                                        

 

             MONOCYTES ABSOLUTE COUNT (BEAKER) 0.55 K/ L    0.00-1.30           

      



             (test code = 415)                                        

 

             EOSINOPHILS ABSOLUTE COUNT 0.24 K/ L    0.00-0.50                 



             (BEAKER) (test code = 416)                                        

 

             BASOPHILS ABSOLUTE COUNT (BEAKER) 0.05 K/ L    0.00-0.20           

      



             (test code = 417)                                        



[id number] METABOLIC [id number] 11:11:00





             Test Item    Value        Reference Range Interpretation Comments

 

             SODIUM (BEAKER) 138 meq/L    136-145                   



             (test code = 381)                                        

 

             POTASSIUM (BEAKER) 4.0 meq/L    3.5-5.1                   



             (test code = 379)                                        

 

             CHLORIDE (BEAKER) 108 meq/L           H            



             (test code = 382)                                        

 

             CO2 (BEAKER) (test 23 meq/L     22-29                     



             code = 355)                                         

 

             BLOOD UREA NITROGEN 11 mg/dL     7-21                      



             (BEAKER) (test code                                        



             = 354)                                              

 

             CREATININE (BEAKER) 0.74 mg/dL   0.57-1.25                 



             (test code = 358)                                        

 

             GLUCOSE RANDOM 124 mg/dL           H            



             (BEAKER) (test code                                        



             = 652)                                              

 

             CALCIUM (BEAKER) 8.9 mg/dL    8.4-10.2                  



             (test code = 697)                                        

 

             EGFR (BEAKER) (test 150 mL/min/1.73                           ESTIM

ATED GFR IS



             code = 1092) sq m                                   NOT AS ACCURATE

 AS



                                                                 CREATININE



                                                                 CLEARANCE IN



                                                                 PREDICTING



                                                                 GLOMERULAR



                                                                 FILTRATION RATE

.



                                                                 ESTIMATED GFR I

S



                                                                 NOT APPLICABLE 

FOR



                                                                 DIALYSIS PATIEN

TS.



URINE [id number] 09:56:00





             Test Item    Value        Reference Range Interpretation Comments

 

             CULTURE (BEAKER) (test <10,000 col/mL skin                         

  



             code = 1095) jaime                                  



COMPREHENSIVE METABOLIC [id number] 08:49:00





             Test Item    Value        Reference Range Interpretation Comments

 

             TOTAL PROTEIN 7.3 gm/dL    6.0-8.3                   



             (BEAKER) (test code =                                        



             770)                                                

 

             ALBUMIN (BEAKER) 3.3 g/dL     3.5-5.0      L            



             (test code = 1145)                                        

 

             ALKALINE PHOSPHATASE 89 U/L                           



             (BEAKER) (test code =                                        



             346)                                                

 

             BILIRUBIN TOTAL 1.4 mg/dL    0.2-1.2      H            



             (BEAKER) (test code =                                        



             377)                                                

 

             SODIUM (BEAKER) (test 137 meq/L    136-145                   



             code = 381)                                         

 

             POTASSIUM (BEAKER) 3.7 meq/L    3.5-5.1                   



             (test code = 379)                                        

 

             CHLORIDE (BEAKER) 108 meq/L           H            



             (test code = 382)                                        

 

             CO2 (BEAKER) (test 17 meq/L     22-29        L            



             code = 355)                                         

 

             BLOOD UREA NITROGEN 12 mg/dL     7-21                      



             (BEAKER) (test code =                                        



             354)                                                

 

             CREATININE (BEAKER) 0.78 mg/dL   0.57-1.25                 



             (test code = 358)                                        

 

             GLUCOSE RANDOM 119 mg/dL           H            



             (BEAKER) (test code =                                        



             652)                                                

 

             CALCIUM (BEAKER) 8.6 mg/dL    8.4-10.2                  



             (test code = 697)                                        

 

             AST (SGOT) (BEAKER) 13 U/L       5-34                      



             (test code = 353)                                        

 

             ALT (SGPT) (BEAKER) 28 U/L       6-55                      



             (test code = 347)                                        

 

             EGFR (BEAKER) (test 141                                    ESTIMATE

D GFR IS



             code = 1092) mL/min/1.73 sq                           NOT AS ACCURA

TE AS



                          m                                      CREATININE



                                                                 CLEARANCE IN



                                                                 PREDICTING



                                                                 GLOMERULAR



                                                                 FILTRATION RATE

.



                                                                 ESTIMATED GFR I

S



                                                                 NOT APPLICABLE 

FOR



                                                                 DIALYSIS PATIEN

TS.



CBC W/PLT COUNT &amp; AUTO [id number] 08:46:00





             Test Item    Value        Reference Range Interpretation Comments

 

             WHITE BLOOD CELL COUNT (BEAKER) 9.4 K/ L     4.0-10.0              

    



             (test code = 775)                                        

 

             RED BLOOD CELL COUNT (BEAKER) 4.79 M/ L    4.20-5.80               

  



             (test code = 761)                                        

 

             HEMOGLOBIN (BEAKER) (test code = 12.8 GM/DL   13.0-16.8    L       

     



             410)                                                

 

             HEMATOCRIT (BEAKER) (test code = 40.0 %       40.0-50.0            

     



             411)                                                

 

             MEAN CORPUSCULAR VOLUME (BEAKER) 83.4 fL      82.0-98.0            

     



             (test code = 753)                                        

 

             MEAN CORPUSCULAR HEMOGLOBIN 26.7 pg      27.0-33.0    L            



             (BEAKER) (test code = 751)                                        

 

             MEAN CORPUSCULAR HEMOGLOBIN CONC 32.0 GM/DL   32.0-36.0            

     



             (BEAKER) (test code = 752)                                        

 

             RED CELL DISTRIBUTION WIDTH 18.2 %       10.3-14.2    H            



             (BEAKER) (test code = 412)                                        

 

             PLATELET COUNT (BEAKER) (test 313 K/CU MM  150-430                 

  



             code = 756)                                         

 

             MEAN PLATELET VOLUME (BEAKER) 8.6 fL       6.5-10.5                

  



             (test code = 754)                                        

 

             NUCLEATED RED BLOOD CELLS 0 /100 WBC   0-0                       



             (BEAKER) (test code = 413)                                        

 

             NEUTROPHILS RELATIVE PERCENT 70 %                                  

 



             (BEAKER) (test code = 429)                                        

 

             LYMPHOCYTES RELATIVE PERCENT 16 %                                  

 



             (BEAKER) (test code = 430)                                        

 

             MONOCYTES RELATIVE PERCENT 12 %                                   



             (BEAKER) (test code = 431)                                        

 

             EOSINOPHILS RELATIVE PERCENT 1 %                                   

 



             (BEAKER) (test code = 432)                                        

 

             BASOPHILS RELATIVE PERCENT 1 %                                    



             (BEAKER) (test code = 437)                                        

 

             NEUTROPHILS ABSOLUTE COUNT 6.54 K/ L    1.80-8.00                 



             (BEAKER) (test code = 670)                                        

 

             LYMPHOCYTES ABSOLUTE COUNT 1.50 K/ L    1.48-4.50                 



             (BEAKER) (test code = 414)                                        

 

             MONOCYTES ABSOLUTE COUNT (BEAKER) 1.13 K/ L    0.00-1.30           

      



             (test code = 415)                                        

 

             EOSINOPHILS ABSOLUTE COUNT 0.13 K/ L    0.00-0.50                 



             (BEAKER) (test code = 416)                                        

 

             BASOPHILS ABSOLUTE COUNT (BEAKER) 0.06 K/ L    0.00-0.20           

      



             (test code = 417)                                        



0.00URINALYSIS W/ [id number] 20:36:00





             Test Item    Value        Reference Range Interpretation Comments

 

             COLOR (BEAKER) (test code = 470) Yellow                            

     

 

             CLARITY (BEAKER) (test code = 469) Hazy                            

       

 

             SPECIFIC GRAVITY UA (BEAKER) (test 1.012        1.001-1.035        

       



             code = 468)                                         

 

             PH UA (BEAKER) (test code = 467) 5.5          5.0-8.0              

     

 

             PROTEIN UA (BEAKER) (test code = 100 mg/dL    Negative     A       

     



             464)                                                

 

             GLUCOSE UA (BEAKER) (test code = Negative     Negative             

     



             365)                                                

 

             KETONES UA (BEAKER) (test code = Negative     Negative             

     



             371)                                                

 

             BILIRUBIN UA (BEAKER) (test code = Negative     Negative           

       



             462)                                                

 

             BLOOD UA (BEAKER) (test code = 461) Moderate     Negative     A    

        

 

             NITRITE UA (BEAKER) (test code = Negative     Negative             

     



             465)                                                

 

             LEUKOCYTE ESTERASE UA (BEAKER) Large        Negative     A         

   



             (test code = 466)                                        

 

             UROBILINOGEN UA (BEAKER) (test code 3.0 mg/dL    0.2-1.0      H    

        



             = 463)                                              

 

             RBC UA (BEAKER) (test code = 519) 19 /HPF                          

      

 

             WBC UA (BEAKER) (test code = 520) 182 /HPF                         

      

 

             SOURCE(BEAKER) (test code = 9518)                                  

      



BASIC METABOLIC [id number] 17:00:00





             Test Item    Value        Reference Range Interpretation Comments

 

             SODIUM (BEAKER) 140 meq/L    136-145                   



             (test code = 381)                                        

 

             POTASSIUM (BEAKER) 3.7 meq/L    3.5-5.1                   



             (test code = 379)                                        

 

             CHLORIDE (BEAKER) 112 meq/L           H            



             (test code = 382)                                        

 

             CO2 (BEAKER) (test 17 meq/L     22-29        L            



             code = 355)                                         

 

             BLOOD UREA NITROGEN 23 mg/dL     7-21         H            



             (BEAKER) (test code                                        



             = 354)                                              

 

             CREATININE (BEAKER) 1.00 mg/dL   0.57-1.25                 



             (test code = 358)                                        

 

             GLUCOSE RANDOM 102 mg/dL                        



             (BEAKER) (test code                                        



             = 652)                                              

 

             CALCIUM (BEAKER) 8.7 mg/dL    8.4-10.2                  



             (test code = 697)                                        

 

             EGFR (BEAKER) (test 106 mL/min/1.73                           ESTIM

ATED GFR IS



             code = 1092) sq m                                   NOT AS ACCURATE

 AS



                                                                 CREATININE



                                                                 CLEARANCE IN



                                                                 PREDICTING



                                                                 GLOMERULAR



                                                                 FILTRATION RATE

.



                                                                 ESTIMATED GFR I

S



                                                                 NOT APPLICABLE 

FOR



                                                                 DIALYSIS PATIEN

TS.



Specimen slightly ictericCBC W/PLT COUNT &amp; AUTO [id number] 
12:18:00





             Test Item    Value        Reference Range Interpretation Comments

 

             WHITE BLOOD CELL COUNT (BEAKER) 16.4 K/ L    4.0-10.0     H        

    



             (test code = 775)                                        

 

             RED BLOOD CELL COUNT (BEAKER) 5.22 M/ L    4.20-5.80               

  



             (test code = 761)                                        

 

             HEMOGLOBIN (BEAKER) (test code = 14.4 GM/DL   13.0-16.8            

     



             410)                                                

 

             HEMATOCRIT (BEAKER) (test code = 42.9 %       40.0-50.0            

     



             411)                                                

 

             MEAN CORPUSCULAR VOLUME (BEAKER) 82.2 fL      82.0-98.0            

     



             (test code = 753)                                        

 

             MEAN CORPUSCULAR HEMOGLOBIN 27.5 pg      27.0-33.0                 



             (BEAKER) (test code = 751)                                        

 

             MEAN CORPUSCULAR HEMOGLOBIN CONC 33.5 GM/DL   32.0-36.0            

     



             (BEAKER) (test code = 752)                                        

 

             RED CELL DISTRIBUTION WIDTH 16.2 %       10.3-14.2    H            



             (BEAKER) (test code = 412)                                        

 

             PLATELET COUNT (BEAKER) (test 329 K/CU MM  150-430                 

  



             code = 756)                                         

 

             MEAN PLATELET VOLUME (BEAKER) 8.2 fL       6.5-10.5                

  



             (test code = 754)                                        

 

             NUCLEATED RED BLOOD CELLS 0 /100 WBC   0-0                       



             (BEAKER) (test code = 413)                                        

 

             NEUTROPHILS RELATIVE PERCENT 83 %                                  

 



             (BEAKER) (test code = 429)                                        

 

             LYMPHOCYTES RELATIVE PERCENT 7 %                                   

 



             (BEAKER) (test code = 430)                                        

 

             MONOCYTES RELATIVE PERCENT 9 %                                    



             (BEAKER) (test code = 431)                                        

 

             EOSINOPHILS RELATIVE PERCENT 0 %                                   

 



             (BEAKER) (test code = 432)                                        

 

             BASOPHILS RELATIVE PERCENT 0 %                                    



             (BEAKER) (test code = 437)                                        

 

             NEUTROPHILS ABSOLUTE COUNT 1.62 K/ L    1.80-8.00    L            



             (BEAKER) (test code = 670)                                        

 

             LYMPHOCYTES ABSOLUTE COUNT 1.15 K/ L    1.48-4.50    L            



             (BEAKER) (test code = 414)                                        

 

             MONOCYTES ABSOLUTE COUNT (BEAKER) 1.56 K/ L    0.00-1.30    H      

      



             (test code = 415)                                        

 

             EOSINOPHILS ABSOLUTE COUNT 0.01 K/ L    0.00-0.50                 



             (BEAKER) (test code = 416)                                        

 

             BASOPHILS ABSOLUTE COUNT (BEAKER) 0.02 K/ L    0.00-0.20           

      



             (test code = 417)                                        



(MANUAL DIFFERENTIAL)2017 12:18:00





             Test Item    Value        Reference Range Interpretation Comments

 

             TOTAL COUNTED (BEAKER) (test code =                                

        



             1351)                                               

 

             WBC MORPHOLOGY (BEAKER) (test code = Normal                        

         



             487)                                                

 

             PLT MORPHOLOGY (BEAKER) (test code = Normal                        

         



             486)                                                

 

             RBC MORPHOLOGY (BEAKER) (test code = Normal                        

         



             762)

## 2022-03-24 NOTE — EDPHYS
Physician Documentation                                                                           

 [facility]                                                                 

Name: Redd Dale Jr                                                                            

Age: 36 yrs                                                                                       

Sex: Male                                                                                         

: 1985                                                                                   

MRN: [id number]                                                                                   

Arrival Date: 2022                                                                          

Time: 19:17                                                                                       

Account#: [id number]                                                                            

Bed 15                                                                                            

Private MD:                                                                                       

KRZYSZTOF Physician Luis Cruz                                                                      

HPI:                                                                                              

                                                                                             

19:36 This 36 yrs old Black Male presents to ER via Unassigned with complaints of Headache.   arlin 

19:36 The patient complains of pain to the top of head, forehead, left frontal area, left     arlin 

      side of the back of head, left occipital area, left base of the skull, right frontal        

      area, right side of the back of head, right occipital area and right base of the skull.     

      The patient describes the headache as constant. Onset: The symptoms/episode                 

      began/occurred 3 week(s) ago. Associated signs and symptoms: Pertinent positives:           

      dizziness, nausea. Severity of symptoms: At its worst the pain was moderate, in the         

      emergency department the pain is unchanged. Headache History: The patient has had           

      previous headaches and this one is similar to previous episodes. The patient has            

      experienced similar episodes in the past, multiple times.                                   

                                                                                                  

Historical:                                                                                       

- Allergies:                                                                                      

22:58 Amoxicillin;                                                                            sv1 

22:58 Ciprofloxacin;                                                                          sv1 

22:58 CLAVULANIC ACID;                                                                        sv1 

22:58 Doxycycline;                                                                            sv1 

22:58 Demerol;                                                                                sv1 

22:58 Bactrim;                                                                                sv1 

22:58 Levofloxacin;                                                                           sv1 

22:58 Morphine;                                                                               sv1 

22:58 PENICILLINS;                                                                            sv1 

22:58 Toradol;                                                                                sv1 

22:58 TRIMETHOPRIM;                                                                           sv1 

22:58 Vancomycin;                                                                             sv1 

22:58 Zofran;                                                                                 sv1 

- Home Meds:                                                                                      

22:58 Celexa 20 mg Oral tab 1 tab once daily [Active]; fentanyl 25 mcg/hr Topical pt72 1      sv1 

      patch every 72 hours [Active]; Pepcid 20 mg Oral tab 1 tab once daily [Active];             

      Percocet  mg Oral tab 1 tab every 6 hours [Active]; Reglan 10 mg Oral tab 1 tab       

      once daily [Active]; Wellbutrin  mg Oral TbER 1 tab 2 times per day [Active];         

- PMHx:                                                                                           

22:58 Asthma; Cerebral Palsy; decubitus ulcers on feet; cluster headaches; GERD;              sv1 

      Hypertension; Hydrocephalus; spina bifida;                                                  

                                                                                                  

- Immunization history:: Adult Immunizations up to date.                                          

- Social history:: Smoking status: Patient reports the use of cigarette tobacco                   

  products.                                                                                       

- Family history:: not pertinent.                                                                 

                                                                                                  

                                                                                                  

ROS:                                                                                              

19:36 Constitutional: Negative for fever, chills, and weight loss, Eyes: Negative for injury, arlin 

      pain, redness, and discharge, ENT: Negative for injury, pain, and discharge, Neck:          

      Negative for injury, pain, and swelling, Cardiovascular: Negative for chest pain,           

      palpitations, and edema, Respiratory: Negative for shortness of breath, cough,              

      wheezing, and pleuritic chest pain, Abdomen/GI: Negative for abdominal pain, nausea,        

      vomiting, diarrhea, and constipation, Back: Negative for injury and pain, : Negative      

      for injury, bleeding, discharge, and swelling, MS/Extremity: Negative for injury and        

      deformity, Skin: Negative for injury, rash, and discoloration, Psych: Negative for          

      depression, anxiety, suicide ideation, homicidal ideation, and hallucinations,              

      Allergy/Immunology: Negative for hives, rash, and allergies, Endocrine: Negative for        

      neck swelling, polydipsia, polyuria, polyphagia, and marked weight changes,                 

      Hematologic/Lymphatic: Negative for swollen nodes, abnormal bleeding, and unusual           

      bruising.                                                                                   

19:36 Neuro: Positive for dizziness, headache.                                                    

                                                                                                  

Exam:                                                                                             

19:36 Constitutional:  This is a well developed, well nourished patient who is awake, alert,  arlin 

      and in no acute distress. Head/Face:  Normocephalic, atraumatic. Eyes:  Pupils equal        

      round and reactive to light, extra-ocular motions intact.  Lids and lashes normal.          

      Conjunctiva and sclera are non-icteric and not injected.  Cornea within normal limits.      

      Periorbital areas with no swelling, redness, or edema. ENT:  Nares patent. No nasal         

      discharge, no septal abnormalities noted.  Tympanic membranes are normal and external       

      auditory canals are clear.  Oropharynx with no redness, swelling, or masses, exudates,      

      or evidence of obstruction, uvula midline.  Mucous membranes moist. Neck:  Trachea          

      midline, no thyromegaly or masses palpated, and no cervical lymphadenopathy.  Supple,       

      full range of motion without nuchal rigidity, or vertebral point tenderness.  No            

      Meningismus. Chest/axilla:  Normal chest wall appearance and motion.  Nontender with no     

      deformity.  No lesions are appreciated. Cardiovascular:  Regular rate and rhythm with a     

      normal S1 and S2.  No gallops, murmurs, or rubs.  Normal PMI, no JVD.  No pulse             

      deficits. Respiratory:  Lungs have equal breath sounds bilaterally, clear to                

      auscultation and percussion.  No rales, rhonchi or wheezes noted.  No increased work of     

      breathing, no retractions or nasal flaring. Abdomen/GI:  Soft, non-tender, with normal      

      bowel sounds.  No distension or tympany.  No guarding or rebound.  No evidence of           

      tenderness throughout. Back:  No spinal tenderness.  No costovertebral tenderness.          

      Full range of motion. Skin:  Warm, dry with normal turgor.  Normal color with no            

      rashes, no lesions, and no evidence of cellulitis. MS/ Extremity:  Pulses equal, no         

      cyanosis.  Neurovascular intact.  Full, normal range of motion. Neuro:  Awake and           

      alert, GCS 15, oriented to person, place, time, and situation.  Cranial nerves II-XII       

      grossly intact.  Motor strength 5/5 in all extremities.  Sensory grossly intact.            

      Cerebellar exam normal.  Normal gait. Psych:  Awake, alert, with orientation to person,     

      place and time.  Behavior, mood, and affect are within normal limits.                       

19:36 Neck: External neck: is normal, no acute changes, C-spine: appears grossly normal, no       

      acute changes, ROM/movement: is normal, no acute changes, pain, is not appreciated,         

      limited range of motion, is not appreciated.                                                

                                                                                                  

Vital Signs:                                                                                      

19:36  / 102 LA Supine (auto/reg); Pulse 99 MON; Resp 14 S; Temp 98.8; Pulse Ox 98% on  sv1 

      R/A; Weight 127.46 kg (R); Height 4 ft. 11 in. (149.86 cm) (R); Pain 10/10;                 

19:44  / 102 LA Supine (auto/reg); Pulse 99 MON; Resp 14 S; Temp 98.9(O); Pulse Ox 98%  sv1 

      on R/A;                                                                                     

19:36 Body Mass Index 56.75 (127.46 kg, 149.86 cm)                                            sv1 

                                                                                                  

Kori Coma Score:                                                                               

19:38 Eye Response: spontaneous(4). Verbal Response: oriented(5). Motor Response: obeys       arlin 

      commands(6). Total: 15.                                                                     

                                                                                                  

MDM:                                                                                              

19:21 Patient medically screened.                                                             arlin 

19:38 Differential diagnosis: epidural hematoma, neoplasm. Data reviewed: vital signs, nurses arlin 

      notes, lab test result(s), radiologic studies, CT scan, plain films. Data interpreted:      

      Cardiac monitor: rate is 85 beats/min, rhythm is regular, Pulse oximetry: on room air.      

      Test interpretation: by ED physician or midlevel provider: ECG, plain radiologic            

      studies. Counseling: I had a detailed discussion with the patient and/or guardian           

      regarding: the historical points, exam findings, and any diagnostic results supporting      

      the discharge/admit diagnosis, lab results, radiology results.                              

                                                                                                  

                                                                                             

19:35 Order name: CBC with Diff; Complete Time: 21:02                                         [facility] 

                                                                                             

19:35 Order name: Comprehensive Metabolic Panel; Complete Time: 21:37                         [facility] 

                                                                                             

19:35 Order name: CT Head Brain wo Cont; Complete Time: 22:45                                 [facility] 

                                                                                             

19:35 Order name: Shuntogram XRAY; Complete Time: 22:45                                       [facility] 

                                                                                             

19:35 Order name: Urine Culture                                                               [facility] 

                                                                                             

21:06 Order name: COVID-19/FLU A+B (Document "Date of Onset" if Symptomatic); Complete Time:  bb  

      22:45                                                                                       

                                                                                                  

Administered Medications:                                                                         

22:53 Drug: Dilaudid (HYDROmorphone) 1 mg Route: IVP; Site: left subclavian;                  sv1 

                                                                                                  

                                                                                                  

Disposition Summary:                                                                              

22 21:10                                                                                    

Transfer Ordered                                                                                  

      Transfer Location: [facility] 

      Reason: Higher level of care                                                            arlin 

      Condition: Stable                                                                       arlin 

      Problem: new                                                                            arlin 

      Symptoms: have improved                                                                 arlin 

      Accepting Physician: to [facility](22 22:59)                                     sv1 

      Diagnosis                                                                                   

        - Headache -  SHUNT, STATUS                                                         arlin 

        - Obesity, unspecified - SPINA BIFIDA                                                 arlin 

        - Elevated white blood cell count                                                     arlin 

      Forms:                                                                                      

        - Medication Reconciliation Form                                                      arlin 

        - SBAR form                                                                           arlin 

Signatures:                                                                                       

Dispatcher [employer]                           EDLuis Messina MD MD cha Villicano, Steven RN                   RN   sv1                                                  

                                                                                                  

Corrections: (The following items were deleted from the chart)                                    

22:59 21:10 to [facility] arlin                                                                sv1 

                                                                                                  

**************************************************************************************************

## 2022-03-24 NOTE — RAD REPORT
EXAM DESCRIPTION:  CT - Head Brain Wo Cont - 3/24/2022 9:46 pm

 

CLINICAL HISTORY:  HEADACHE

Headache, drowsiness

 

COMPARISON:  Head Brain Wo Cont dated 3/18/2022; Head Brain Wo Cont dated 1/7/2020

 

TECHNIQUE:  All CT scans are performed using dose optimization technique as appropriate and may inclu
de automated exposure control or mA/KV adjustment according to patient size.

 

FINDINGS:  The ventricular system is quite decompressed.Right-sided shunt tubing is in place, unchang
ed in position. No acute bony defect, extra-axial fluid collection midline shift.

 

The paranasal sinuses and mastoids are clear. The calvarium is intact.

 

IMPRESSION:  The ventricular system is quite decompressed. Suggest clinical correlation for possible 
slight ventricle syndrome.  Otherwise, no acute process is identified.

## 2022-03-24 NOTE — ER
Nurse's Notes                                                                                     

 [facility]                                                                 

Name: Redd Dale Jr                                                                            

Age: 36 yrs                                                                                       

Sex: Male                                                                                         

: 1985                                                                                   

MRN: [id number]                                                                                   

Arrival Date: 2022                                                                          

Time: 19:17                                                                                       

Account#: [id number]                                                                            

Bed 15                                                                                            

Private MD:                                                                                       

Diagnosis: Headache- SHUNT, STATUS;Obesity, unspecified-SPINA BIFIDA;Elevated white blood cell  

  count                                                                                           

                                                                                                  

Presentation:                                                                                     

                                                                                             

19:36 Chief complaint: Patient states: headache x 1 month. Coronavirus screen: Vaccine        sv1 

      status: Patient reports receiving the 2nd dose of the covid vaccine. Client denies          

      travel out of the [place] in the last 14 days. At this time, the client does not indicate      

      any symptoms associated with coronavirus-19. Ebola Screen: Patient denies exposure to       

      infectious person. No symptoms or risks identified at this time. Initial Sepsis Screen:     

      Does the patient meet any 2 criteria? No. Patient's initial sepsis screen is negative.      

      Does the patient have a suspected source of infection? No. Patient's initial sepsis         

      screen is negative. Risk Assessment: Do you want to hurt yourself or someone else?          

      Patient reports no desire to harm self or others. Onset of symptoms was 2022.                                                                                       

19:36 Method Of Arrival: EMS: [facility]                                                sv1 

19:36 Acuity: AYESHA 3                                                                           sv1 

                                                                                                  

Triage Assessment:                                                                                

19:36 Headache History: Denies prior headaches. General: Appears distressed, obese, Behavior  sv1 

      is cooperative. Pain: Pain currently is 10 out of 10 on a pain scale. Neuro: No             

      deficits noted.                                                                             

22:57 Pain: Pain began gradually, one month Also complains of.                                sv1 

                                                                                                  

Historical:                                                                                       

- Allergies:                                                                                      

22:58 Amoxicillin;                                                                            sv1 

22:58 Ciprofloxacin;                                                                          sv1 

22:58 CLAVULANIC ACID;                                                                        sv1 

22:58 Doxycycline;                                                                            sv1 

22:58 Demerol;                                                                                sv1 

22:58 Bactrim;                                                                                sv1 

22:58 Levofloxacin;                                                                           sv1 

22:58 Morphine;                                                                               sv1 

22:58 PENICILLINS;                                                                            sv1 

22:58 Toradol;                                                                                sv1 

22:58 TRIMETHOPRIM;                                                                           sv1 

22:58 Vancomycin;                                                                             sv1 

22:58 Zofran;                                                                                 sv1 

- Home Meds:                                                                                      

22:58 Celexa 20 mg Oral tab 1 tab once daily [Active]; fentanyl 25 mcg/hr Topical pt72 1      sv1 

      patch every 72 hours [Active]; Pepcid 20 mg Oral tab 1 tab once daily [Active];             

      Percocet  mg Oral tab 1 tab every 6 hours [Active]; Reglan 10 mg Oral tab 1 tab       

      once daily [Active]; Wellbutrin  mg Oral TbER 1 tab 2 times per day [Active];         

- PMHx:                                                                                           

22:58 Asthma; Cerebral Palsy; decubitus ulcers on feet; cluster headaches; GERD;              sv1 

      Hypertension; Hydrocephalus; spina bifida;                                                  

                                                                                                  

- Immunization history:: Adult Immunizations up to date.                                          

- Social history:: Smoking status: Patient reports the use of cigarette tobacco                   

  products.                                                                                       

- Family history:: not pertinent.                                                                 

                                                                                                  

                                                                                                  

Screenin:45 Abuse screen: Denies threats or abuse. Nutritional screening: No deficits noted.        sv1 

      Tuberculosis screening: No symptoms or risk factors identified. Fall Risk No fall in        

      past 12 months (0 pts). No secondary diagnosis (0 pts). IV access (20 points).              

      Ambulatory Aid- Crutches/Cane/Walker (15 pts). Gait- Impaired (20 pts.). Mental Status-     

      Oriented to own ability (0 pts). Total Kim Fall Scale indicates High Risk Score (45       

      or more points).                                                                            

                                                                                                  

Assessment:                                                                                       

19:57 Reassessment: Oriented the patient to the room. Several attempts were made to start an  sv1 

      IV on the patient. Ultra sound guided iv will be attempted. .                               

22:55 Reassessment: The patient is being ransferred to [facility]. Report was given sv1 

      to the transfer team Report was called to Ashley COTTO..                                          

                                                                                                  

Vital Signs:                                                                                      

19:36  / 102 LA Supine (auto/reg); Pulse 99 MON; Resp 14 S; Temp 98.8; Pulse Ox 98% on  sv1 

      R/A; Weight 127.46 kg (R); Height 4 ft. 11 in. (149.86 cm) (R); Pain 10/10;                 

19:44  / 102 LA Supine (auto/reg); Pulse 99 MON; Resp 14 S; Temp 98.9(O); Pulse Ox 98%  sv1 

      on R/A;                                                                                     

19:36 Body Mass Index 56.75 (127.46 kg, 149.86 cm)                                            sv1 

                                                                                                  

Kori Coma Score:                                                                               

19:38 Eye Response: spontaneous(4). Verbal Response: oriented(5). Motor Response: obeys       arlin 

      commands(6). Total: 15.                                                                     

                                                                                                  

ED Course:                                                                                        

19:17 Patient arrived in ED.                                                                  ag3 

19:21 Luis Cruz MD is Attending Physician.                                             arlin 

19:35 Hi Andersen, RN is Primary Nurse.                                                 sv1 

19:36 Arm band placed on right wrist.                                                         sv1 

19:42 Triage completed.                                                                       sv1 

19:45 Patient has correct armband on for positive identification. Bed in low position. Call   sv1 

      light in reach. Side rails up X2.                                                           

19:56 Urine Culture Sent.                                                                     sv1 

20:50 Missed attempt(s): 20 gauge in right forearm. Bleeding controlled, band aid applied,    bb  

      catheter tip intact. Missed attempt(s): 18 gauge in left antecubital area. Bleeding         

      controlled, band aid applied, catheter tip intact.                                          

21:47 Shuntogram XRAY In Process Unspecified.                                                 EDMS

21:48 CT Head Brain wo Cont In Process Unspecified.                                           EDMS

21:50 Urine Culture Sent.                                                                     sv1 

22:04 COVID-19/FLU A+B (Document "Date of Onset" if Symptomatic) Sent.                        sv1 

22:56 No provider procedures requiring assistance completed. Patient transferred, IV remains  sv1 

      in place.                                                                                   

                                                                                                  

Administered Medications:                                                                         

22:53 Drug: Dilaudid (HYDROmorphone) 1 mg Route: IVP; Site: left subclavian;                  sv1 

                                                                                                  

                                                                                                  

Outcome:                                                                                          

21:10 ER care complete, transfer ordered by MD.                                               [facility] 

22:56 Transferred by ground EMS to [facility].                                      sv1 

22:56 Condition: good                                                                             

22:56 Instructed on the need for transfer.                                                        

22:59 Patient left the ED.                                                                    sv1 

                                                                                                  

Addendum:                                                                                         

2022                                                                                        

     12:56 Addendum: Culture Results: Positive urine culture. Faxed results to [facility] .  [email or web address]
[email or web address]

                                                                                                  

Signatures:                                                                                       

Dispatcher [employer]                           EDMS                                                 

Luis Cruz MD MD cha Ballard, Brenda RN                     RN   Vilma Arias, RN                     RN   Lauren Crowe                                 ag3                                                  

Hi Andersen, RN                   RN   sv1                                                  

                                                                                                  

**************************************************************************************************

## 2022-04-08 ENCOUNTER — HOSPITAL ENCOUNTER (EMERGENCY)
Dept: HOSPITAL 97 - ER | Age: 37
Discharge: ANOTHER HOSPITAL | End: 2022-04-08
Payer: COMMERCIAL

## 2022-04-08 VITALS — SYSTOLIC BLOOD PRESSURE: 128 MMHG | TEMPERATURE: 98.1 F | DIASTOLIC BLOOD PRESSURE: 91 MMHG | OXYGEN SATURATION: 97 %

## 2022-04-08 DIAGNOSIS — Z20.822: ICD-10-CM

## 2022-04-08 DIAGNOSIS — Z88.3: ICD-10-CM

## 2022-04-08 DIAGNOSIS — F17.210: ICD-10-CM

## 2022-04-08 DIAGNOSIS — T85.09XA: ICD-10-CM

## 2022-04-08 DIAGNOSIS — Z88.1: ICD-10-CM

## 2022-04-08 DIAGNOSIS — Z88.8: ICD-10-CM

## 2022-04-08 DIAGNOSIS — I10: ICD-10-CM

## 2022-04-08 DIAGNOSIS — Z88.0: ICD-10-CM

## 2022-04-08 DIAGNOSIS — R51.9: Primary | ICD-10-CM

## 2022-04-08 DIAGNOSIS — Q05.9: ICD-10-CM

## 2022-04-08 DIAGNOSIS — Z88.5: ICD-10-CM

## 2022-04-08 DIAGNOSIS — G80.9: ICD-10-CM

## 2022-04-08 LAB
ALBUMIN SERPL BCP-MCNC: 3.4 G/DL (ref 3.4–5)
ALP SERPL-CCNC: 149 U/L (ref 45–117)
ALT SERPL W P-5'-P-CCNC: 170 U/L (ref 12–78)
AST SERPL W P-5'-P-CCNC: 49 U/L (ref 15–37)
BUN BLD-MCNC: 15 MG/DL (ref 7–18)
GLUCOSE SERPLBLD-MCNC: 116 MG/DL (ref 74–106)
HCT VFR BLD CALC: 48.6 % (ref 39.6–49)
INR BLD: 1.03
LYMPHOCYTES # SPEC AUTO: 1.8 K/UL (ref 0.7–4.9)
MAGNESIUM SERPL-MCNC: 2.2 MG/DL (ref 1.8–2.4)
NT-PROBNP SERPL-MCNC: 13 PG/ML (ref ?–125)
PMV BLD: 8.7 FL (ref 7.6–11.3)
POTASSIUM SERPL-SCNC: 4.4 MMOL/L (ref 3.5–5.1)
RBC # BLD: 5.57 M/UL (ref 4.33–5.43)
TROPONIN I SERPL HS-MCNC: 3.1 PG/ML (ref ?–58.9)

## 2022-04-08 PROCEDURE — 80076 HEPATIC FUNCTION PANEL: CPT

## 2022-04-08 PROCEDURE — 49427 INJECTION ABDOMINAL SHUNT: CPT

## 2022-04-08 PROCEDURE — 85025 COMPLETE CBC W/AUTO DIFF WBC: CPT

## 2022-04-08 PROCEDURE — 36415 COLL VENOUS BLD VENIPUNCTURE: CPT

## 2022-04-08 PROCEDURE — 70450 CT HEAD/BRAIN W/O DYE: CPT

## 2022-04-08 PROCEDURE — 83735 ASSAY OF MAGNESIUM: CPT

## 2022-04-08 PROCEDURE — 75809 NONVASCULAR SHUNT X-RAY: CPT

## 2022-04-08 PROCEDURE — 83880 ASSAY OF NATRIURETIC PEPTIDE: CPT

## 2022-04-08 PROCEDURE — 96375 TX/PRO/DX INJ NEW DRUG ADDON: CPT

## 2022-04-08 PROCEDURE — 99285 EMERGENCY DEPT VISIT HI MDM: CPT

## 2022-04-08 PROCEDURE — 84484 ASSAY OF TROPONIN QUANT: CPT

## 2022-04-08 PROCEDURE — 96374 THER/PROPH/DIAG INJ IV PUSH: CPT

## 2022-04-08 PROCEDURE — 80048 BASIC METABOLIC PNL TOTAL CA: CPT

## 2022-04-08 PROCEDURE — 96361 HYDRATE IV INFUSION ADD-ON: CPT

## 2022-04-08 PROCEDURE — 85610 PROTHROMBIN TIME: CPT

## 2022-04-08 PROCEDURE — 93005 ELECTROCARDIOGRAM TRACING: CPT

## 2022-04-08 NOTE — RAD REPORT
EXAM DESCRIPTION:  RAD - Shuntogram - 4/8/2022 3:08 pm

 

CLINICAL HISTORY:  PAIN

 

FINDINGS:  There are 8 images of the head, neck, chest and abdomen obtained as a shunt series.

 

Right frontal and posterior right parietal shunt tube is in place extending to midline. Old remnant s
sheldon tubing is seen as well. There is no abnormal bend or kink of the tubing.

 

The chest, abdomen and pelvis soft tissues show no suspicious or unexpected finding. Chronic spine an
d pelvic bone deformities are stable.

 

IMPRESSION:  Shunt series shows no suspicious or unexpected finding.

## 2022-04-08 NOTE — ER
Nurse's Notes                                                                                     

 [facility]                                                                 

Name: Redd Dale Jr                                                                            

Age: 36 yrs                                                                                       

Sex: Male                                                                                         

: 1985                                                                                   

MRN: [id number]                                                                                   

Arrival Date: 2022                                                                          

Time: 13:06                                                                                       

Account#: [id number]                                                                            

Bed 18                                                                                            

Private MD:                                                                                       

Diagnosis: Headache- malfunction                                                                

                                                                                                  

Presentation:                                                                                     

                                                                                             

13:25 Chief complaint: EMS states: Home health nurse called EMS d/t pt's BP being elevated at   

      home, 200/110s, pt c/o headache x approx 3 months, N/V, believes that  shunt is           

      malfunctioning, VSS for EMS w/ systolic BP 130s. Coronavirus screen: Vaccine status:        

      Patient reports receiving the 1st dose of the Covid vaccine. Ebola Screen: No symptoms      

      or risks identified at this time. Initial Sepsis Screen: Does the patient meet any 2        

      criteria? No. Patient's initial sepsis screen is negative. Does the patient have a          

      suspected source of infection? No. Patient's initial sepsis screen is negative. Risk        

      Assessment: Do you want to hurt yourself or someone else? Patient reports no desire to      

      harm self or others. Onset of symptoms was 2022.                                  

13:25 Method Of Arrival: EMS: [facility]                                                      

13:25 Acuity: AYESHA 3                                                                           ph  

                                                                                                  

Triage Assessment:                                                                                

13:29 General: Appears in no apparent distress. Behavior is calm, cooperative, appropriate    ph  

      for age, Denies fever. Pain: Complains of pain in right temple. Neuro: Level of             

      Consciousness is awake, alert, obeys commands, Oriented to person, place, time,             

      situation, Reports blurred vision dizziness, headache in right frontal area.                

      Cardiovascular: Capillary refill < 3 seconds in bilateral fingers Patient's skin is         

      warm and dry. Respiratory: Airway is compromised Respiratory effort is even, unlabored,     

      Respiratory pattern is regular, symmetrical. GI: Reports diarrhea, nausea, vomiting.        

      : Ortega in place to gravity drainage. Derm: Skin is intact, Skin is pink, warm \T\ dry.   

      Musculoskeletal: Amputation of bilateral BKA.                                               

                                                                                                  

Historical:                                                                                       

- Allergies:                                                                                      

13:28 Amoxicillin;                                                                            ph  

13:28 Bactrim;                                                                                ph  

13:28 Ciprofloxacin;                                                                          ph  

13:28 CLAVULANIC ACID;                                                                        ph  

13:28 Demerol;                                                                                ph  

13:28 Doxycycline;                                                                            ph  

13:28 Levofloxacin;                                                                           ph  

13:28 Morphine;                                                                               ph  

13:28 PENICILLINS;                                                                            ph  

13:28 Toradol;                                                                                ph  

13:28 TRIMETHOPRIM;                                                                           ph  

13:28 Vancomycin;                                                                             ph  

13:28 Zofran;                                                                                 ph  

- Home Meds:                                                                                      

13:28 Celexa 20 mg Oral tab 1 tab once daily [Active]; fentanyl 25 mcg/hr Topical pt72 1      ph  

      patch every 72 hours [Active]; Pepcid 20 mg Oral tab 1 tab once daily [Active];             

      Percocet  mg Oral tab 1 tab every 6 hours [Active]; Reglan 10 mg Oral tab 1 tab       

      once daily [Active]; Wellbutrin  mg Oral TbER 1 tab 2 times per day [Active];         

- PMHx:                                                                                           

13:28 Asthma; Cerebral Palsy; cluster headaches; decubitus ulcers on feet; GERD;              ph  

      Hydrocephalus; Hypertension; spina bifida;                                                  

                                                                                                  

- Immunization history:: Adult Immunizations unknown.                                             

- Social history:: Smoking status: Patient reports the use of cigarette tobacco                   

  products, smokes one-half pack cigarettes per day.                                              

                                                                                                  

                                                                                                  

Screenin:38 Abuse screen: Denies threats or abuse. Denies injuries from another. Nutritional        ph  

      screening: No deficits noted. Tuberculosis screening: No symptoms or risk factors           

      identified. Fall Risk None identified.                                                      

                                                                                                  

Assessment:                                                                                       

15:31 Reassessment: Patient appears in no apparent distress at this time. Patient and/or      ph  

      family updated on plan of care and expected duration. Pain level reassessed. Pt asleep      

      w/ equal and unlabored respiration, Spo2 decreased to 87% RA, placed on NC at 2L.           

16:40 Reassessment: Patient appears in no apparent distress at this time. Patient and/or      ph  

      family updated on plan of care and expected duration. Pain level reassessed. Pt resting     

      comfortably w/ eyes closed, respirations even and unlabored, report called to Valeriy COTTO at     

      [facility], transfer form signed by pt, awaiting EMS for transport.                   

                                                                                                  

Vital Signs:                                                                                      

13:25  / 96; Pulse 102; Resp 18; Temp 98.4; Pulse Ox 96% on R/A; Weight 127.46 kg;      ph  

      Height 4 ft. 11 in. (149.86 cm);                                                            

15:31  / 89; Pulse 91; Resp 16; Pulse Ox 95% on 2 lpm NC;                               ph  

17:00  / 91; Pulse 87; Resp 18; Temp 98.1; Pulse Ox 97% on R/A;                         ph  

13:25 Body Mass Index 56.75 (127.46 kg, 149.86 cm)                                            ph  

                                                                                                  

Kori Coma Score:                                                                               

14:38 Eye Response: spontaneous(4). Verbal Response: oriented(5). Motor Response: obeys       arlin 

      commands(6). Total: 15.                                                                     

14:40 Eye Response: spontaneous(4). Verbal Response: oriented(5). Motor Response: obeys       arlin 

      commands(6). Total: 15.                                                                     

                                                                                                  

ED Course:                                                                                        

13:06 Patient arrived in ED.                                                                  eb  

13:09 Isabel Smith, RN is Primary Nurse.                                                    ph  

13:13 Luis Cruz MD is Attending Physician.                                             arlin 

13:28 Triage completed.                                                                       ph  

13:28 Arm band placed on Patient placed in an exam room, on a stretcher, on pulse oximetry.   ph  

13:38 Patient has correct armband on for positive identification. Placed in gown. Bed in low  ph  

      position. Call light in reach. Side rails up X 1. Pulse ox on. NIBP on.                     

14:12 Inserted saline lock: 22 gauge in right forearm, using aseptic technique. Blood         ss  

      collected.                                                                                  

14:26 EKG completed in triage. Results shown to MD.                                           [id number] 

14:34 transfer initiated by Dr. Cruz with [facility] from [facility]  

      Center.                                                                                     

14:42 CT Head Brain wo Cont In Process Unspecified.                                           EDMS

14:54 connected Dr. Monge the neurosurgeon on call for [facility] with Dr. Anthony goncalves  

      for patient transfer consultation.                                                          

14:56 administrative approval given by Yvette Espinoza Paramedic / patient eb  

      has been accepted to [facility] 5 [facility]/ Dr. Sutton has accepted the      

      patient in transfer/ report to be called to the charge nurse at [phone].               

15:10 Shuntogram XRAY In Process Unspecified.                                                 EDMS

15:12 COVID swab sent to lab.                                                                 [id number] 

17:13 No provider procedures requiring assistance completed. Patient transferred, IV remains  ph  

      in place.                                                                                   

                                                                                                  

Administered Medications:                                                                         

14:33 Drug: Phenergan (promethazine) 12.5 mg Route: IVP; Site: right forearm;                 ph  

17:12 Follow up: Response: No adverse reaction                                                ph  

14:35 Drug: NS 0.9% 500 ml Route: IV; Rate: bolus; Site: right forearm;                       ph  

17:11 Follow up: Response: No adverse reaction; RASS: Alert and Calm (0); IV Status:          ph  

      Completed infusion; IV Intake: 500ml                                                        

14:35 Drug: Dilaudid (HYDROmorphone) 1 mg Route: IVP; Site: right forearm;                    ph  

17:12 Follow up: Response: No adverse reaction                                                ph  

14:36 Drug: NS 0.9% 1000 ml Route: IV; Rate: 125 ml/hr; Site: right forearm;                  ph  

17:11 Follow up: Response: No adverse reaction; IV Status: Infusion continued upon transfer   ph  

17:09 Drug: Phenergan (promethazine) 12.5 mg Route: IVP; Site: right forearm;                 ph  

17:12 Follow up: Response: No adverse reaction                                                ph  

17:11 Drug: Dilaudid (HYDROmorphone) 0.5 mg Route: IVP; Site: right forearm;                  ph  

17:12 Follow up: Response: No adverse reaction; Pain is decreased; RASS: Alert and Calm (0)   ph  

                                                                                                  

                                                                                                  

Intake:                                                                                           

17:11 IV: 500ml; Total: 500ml.                                                                ph  

                                                                                                  

Outcome:                                                                                          

14:43 ER care complete, transfer ordered by MD. oliveros 

17:13 Transferred by ground EMS [facility]. to [facility], Transfer form        ph  

      completed. X-rays sent w/ patient.                                                          

17:13 Condition: good                                                                             

17:14 Patient left the ED.                                                                    ph  

                                                                                                  

Signatures:                                                                                       

Dispatcher [employer]                           EDLuis Messina MD MD cha Smirch, Shelby, RN                      RN                                                      

Isabel Smith RN                      RN   Breana Ramirez Jodi                                  7                                                  

Domenica Smith                                   [facility]                                                  

                                                                                                  

**************************************************************************************************

## 2022-04-08 NOTE — EDPHYS
Physician Documentation                                                                           

 [facility]                                                                 

Name: Redd Dale Jr                                                                            

Age: 36 yrs                                                                                       

Sex: Male                                                                                         

: 1985                                                                                   

MRN: [id number]                                                                                   

Arrival Date: 2022                                                                          

Time: 13:06                                                                                       

Account#: [id number]                                                                            

Bed 18                                                                                            

Private MD:                                                                                       

KRZYSZTOF Physician Luis Cruz                                                                      

HPI:                                                                                              

                                                                                             

14:37 This 36 yrs old Black Male presents to ER via EMS with complaints of headache , shunt   arlin 

      malfunction.                                                                                

14:37 The patient complains of pain to the top of head, forehead, left frontal area, left     arlin 

      side of the back of head, left occipital area, left base of the skull, right frontal        

      area, right side of the back of head, right occipital area and right base of the skull.     

      The patient describes the headache as constant. Onset: The symptoms/episode                 

      began/occurred 2 day(s) ago. Associated signs and symptoms: Pertinent positives:            

      nausea. Severity of symptoms: At its worst the pain was moderate, in the emergency          

      department the pain is unchanged. Headache History: The patient has had previous            

      headaches and this one is similar to previous episodes. The symptoms are alleviated by      

      nothing. the symptoms are aggravated by nothing. The patient has not experienced            

      similar symptoms in the past.                                                               

                                                                                                  

Historical:                                                                                       

- Allergies:                                                                                      

13:28 Amoxicillin;                                                                            ph  

13:28 Bactrim;                                                                                ph  

13:28 Ciprofloxacin;                                                                          ph  

13:28 CLAVULANIC ACID;                                                                        ph  

13:28 Demerol;                                                                                ph  

13:28 Doxycycline;                                                                            ph  

13:28 Levofloxacin;                                                                           ph  

13:28 Morphine;                                                                               ph  

13:28 PENICILLINS;                                                                            ph  

13:28 Toradol;                                                                                ph  

13:28 TRIMETHOPRIM;                                                                           ph  

13:28 Vancomycin;                                                                             ph  

13:28 Zofran;                                                                                 ph  

- Home Meds:                                                                                      

13:28 Celexa 20 mg Oral tab 1 tab once daily [Active]; fentanyl 25 mcg/hr Topical pt72 1      ph  

      patch every 72 hours [Active]; Pepcid 20 mg Oral tab 1 tab once daily [Active];             

      Percocet  mg Oral tab 1 tab every 6 hours [Active]; Reglan 10 mg Oral tab 1 tab       

      once daily [Active]; Wellbutrin  mg Oral TbER 1 tab 2 times per day [Active];         

- PMHx:                                                                                           

13:28 Asthma; Cerebral Palsy; cluster headaches; decubitus ulcers on feet; GERD;              ph  

      Hydrocephalus; Hypertension; spina bifida;                                                  

                                                                                                  

- Immunization history:: Adult Immunizations unknown.                                             

- Social history:: Smoking status: Patient reports the use of cigarette tobacco                   

  products, smokes one-half pack cigarettes per day.                                              

                                                                                                  

                                                                                                  

ROS:                                                                                              

14:38 Constitutional: Negative for fever, chills, and weight loss, Eyes: Negative for injury, arlin 

      pain, redness, and discharge, ENT: Negative for injury, pain, and discharge, Neck:          

      Negative for injury, pain, and swelling, Cardiovascular: Negative for chest pain,           

      palpitations, and edema, Respiratory: Negative for shortness of breath, cough,              

      wheezing, and pleuritic chest pain, Abdomen/GI: Negative for abdominal pain, nausea,        

      vomiting, diarrhea, and constipation, Back: Negative for injury and pain, : Negative      

      for injury, bleeding, discharge, and swelling, MS/Extremity: Negative for injury and        

      deformity, Skin: Negative for injury, rash, and discoloration, Psych: Negative for          

      depression, anxiety, suicide ideation, homicidal ideation, and hallucinations,              

      Allergy/Immunology: Negative for hives, rash, and allergies, Endocrine: Negative for        

      neck swelling, polydipsia, polyuria, polyphagia, and marked weight changes,                 

      Hematologic/Lymphatic: Negative for swollen nodes, abnormal bleeding, and unusual           

      bruising.                                                                                   

14:38 Neuro: Positive for headache.                                                               

                                                                                                  

Exam:                                                                                             

14:38 Constitutional:  This is a well developed, well nourished patient who is awake, alert,  arlin 

      and in no acute distress. Head/Face:  Normocephalic, atraumatic. Eyes:  Pupils equal        

      round and reactive to light, extra-ocular motions intact.  Lids and lashes normal.          

      Conjunctiva and sclera are non-icteric and not injected.  Cornea within normal limits.      

      Periorbital areas with no swelling, redness, or edema. ENT:  Nares patent. No nasal         

      discharge, no septal abnormalities noted.  Tympanic membranes are normal and external       

      auditory canals are clear.  Oropharynx with no redness, swelling, or masses, exudates,      

      or evidence of obstruction, uvula midline.  Mucous membranes moist. Neck:  Trachea          

      midline, no thyromegaly or masses palpated, and no cervical lymphadenopathy.  Supple,       

      full range of motion without nuchal rigidity, or vertebral point tenderness.  No            

      Meningismus. Chest/axilla:  Normal chest wall appearance and motion.  Nontender with no     

      deformity.  No lesions are appreciated. Cardiovascular:  Regular rate and rhythm with a     

      normal S1 and S2.  No gallops, murmurs, or rubs.  Normal PMI, no JVD.  No pulse             

      deficits. Respiratory:  Lungs have equal breath sounds bilaterally, clear to                

      auscultation and percussion.  No rales, rhonchi or wheezes noted.  No increased work of     

      breathing, no retractions or nasal flaring. Abdomen/GI:  Soft, non-tender, with normal      

      bowel sounds.  No distension or tympany.  No guarding or rebound.  No evidence of           

      tenderness throughout. Back:  No spinal tenderness.  No costovertebral tenderness.          

      Full range of motion. Male :  Normal genitalia with no discharge or lesions. Neuro:       

      Awake and alert, GCS 15, oriented to person, place, time, and situation.  Cranial           

      nerves II-XII grossly intact.  Motor strength 5/5 in all extremities.  Sensory grossly      

      intact.  Cerebellar exam normal.  Normal gait. Psych:  Awake, alert, with orientation       

      to person, place and time.  Behavior, mood, and affect are within normal limits.            

14:38 Neck: ROM/movement: is normal, no acute changes, Meningeal signs: are not present,          

      Kernig's sign is negative, Brudzinski's sign is negative.                                   

14:40 Skin:  Warm, dry with normal turgor.  Normal color with no rashes, no lesions, and no   arlin 

      evidence of cellulitis.                                                                     

14:44 ECG was reviewed by the Attending Physician.                                            [facility] 

                                                                                                  

Vital Signs:                                                                                      

13:25  / 96; Pulse 102; Resp 18; Temp 98.4; Pulse Ox 96% on R/A; Weight 127.46 kg;      ph  

      Height 4 ft. 11 in. (149.86 cm);                                                            

15:31  / 89; Pulse 91; Resp 16; Pulse Ox 95% on 2 lpm NC;                               ph  

17:00  / 91; Pulse 87; Resp 18; Temp 98.1; Pulse Ox 97% on R/A;                         ph  

13:25 Body Mass Index 56.75 (127.46 kg, 149.86 cm)                                            ph  

                                                                                                  

[place] Coma Score:                                                                               

14:38 Eye Response: spontaneous(4). Verbal Response: oriented(5). Motor Response: obeys       arlin 

      commands(6). Total: 15.                                                                     

14:40 Eye Response: spontaneous(4). Verbal Response: oriented(5). Motor Response: obeys       arlin 

      commands(6). Total: 15.                                                                     

                                                                                                  

MDM:                                                                                              

13:13 Patient medically screened.                                                             arlin 

14:40 Differential diagnosis: cluster headache, temporal arteritis, tension headache. Data    arlin 

      reviewed: vital signs, nurses notes, lab test result(s), EKG, radiologic studies, CT        

      scan, plain films. Data interpreted: Cardiac monitor: rate is 102 beats/min, rhythm is      

      normal sinus rhythm, regular. Test interpretation: by ED physician or midlevel              

      provider: ECG, plain radiologic studies. Counseling: I had a detailed discussion with       

      the patient and/or guardian regarding: the historical points, exam findings, and any        

      diagnostic results supporting the discharge/admit diagnosis, lab results, radiology         

      results.                                                                                    

                                                                                                  

                                                                                             

13:37 Order name: Basic Metabolic Panel; Complete Time: 14:59                                 [facility] 

                                                                                             

13:37 Order name: CBC with Diff; Complete Time: 14:59                                         [facility] 

                                                                                             

13:37 Order name: LFT's; Complete Time: 14:59                                                 [facility] 

                                                                                             

13:37 Order name: Magnesium; Complete Time: 14:59                                             [facility] 

                                                                                             

13:37 Order name: NT PRO-BNP; Complete Time: 14:59                                            [facility] 

                                                                                             

13:37 Order name: PT-INR; Complete Time: 14:59                                                [facility] 

                                                                                             

13:37 Order name: Troponin HS; Complete Time: 14:59                                           [facility] 

                                                                                             

13:37 Order name: CT Head Brain wo Cont; Complete Time: 15:17                                 [facility] 

                                                                                             

13:37 Order name: Shuntogram XRAY; Complete Time: 15:53                                       [facility] 

                                                                                             

14:59 Order name: SARS-COV-2 RT PCR (Document "Date of Onset" if Symptomatic)                 [facility] 

                                                                                             

13:37 Order name: EKG; Complete Time: 13:38                                                   [facility] 

                                                                                             

13:37 Order name: Cardiac monitoring; Complete Time: 14:26                                    [facility] 

                                                                                             

13:37 Order name: EKG - Nurse/Tech; Complete Time: 14:26                                      [facility] 

                                                                                             

13:37 Order name: IV Saline Lock; Complete Time: 14:26                                        [facility] 

                                                                                             

13:37 Order name: Labs collected and sent; Complete Time: 14:26                               [facility] 

                                                                                             

13:37 Order name: O2 Per Protocol; Complete Time: 13:39                                       arlin 

                                                                                             

13:37 Order name: O2 Sat Monitoring; Complete Time: 13:39                                     [facility] 

                                                                                                  

EC:44 Rate is 89 beats/min. Rhythm is regular. QRS Axis is Normal. DC interval is normal. QRS alrin 

      interval is normal. QT interval is normal. No Q waves. T waves are Normal. Clinical         

      impression: Normal ECG and No evidence of ischemia. Interpreted by me. Reviewed by me.      

                                                                                                  

Administered Medications:                                                                         

14:33 Drug: Phenergan (promethazine) 12.5 mg Route: IVP; Site: right forearm;                 ph  

17:12 Follow up: Response: No adverse reaction                                                ph  

14:35 Drug: NS 0.9% 500 ml Route: IV; Rate: bolus; Site: right forearm;                       ph  

17:11 Follow up: Response: No adverse reaction; RASS: Alert and Calm (0); IV Status:          ph  

      Completed infusion; IV Intake: 500ml                                                        

14:35 Drug: Dilaudid (HYDROmorphone) 1 mg Route: IVP; Site: right forearm;                    ph  

17:12 Follow up: Response: No adverse reaction                                                ph  

14:36 Drug: NS 0.9% 1000 ml Route: IV; Rate: 125 ml/hr; Site: right forearm;                  ph  

17:11 Follow up: Response: No adverse reaction; IV Status: Infusion continued upon transfer   ph  

17:09 Drug: Phenergan (promethazine) 12.5 mg Route: IVP; Site: right forearm;                 ph  

17:12 Follow up: Response: No adverse reaction                                                ph  

17:11 Drug: Dilaudid (HYDROmorphone) 0.5 mg Route: IVP; Site: right forearm;                  ph  

17:12 Follow up: Response: No adverse reaction; Pain is decreased; RASS: Alert and Calm (0)   ph  

                                                                                                  

                                                                                                  

Disposition Summary:                                                                              

22 14:43                                                                                    

Transfer Ordered                                                                                  

      Transfer Location: [facility]                                              arlin 

      Reason: Higher level of care                                                            arlin 

      Condition: Stable                                                                       arlin 

      Problem: new                                                                            arlin 

      Symptoms: have improved                                                                 arlin 

      Accepting Physician: to , neurosurgery(22 17:14)                                ph  

      Diagnosis                                                                                   

        - Headache -  malfunction                                                           arlin 

      Forms:                                                                                      

        - Medication Reconciliation Form                                                      arlin 

        - SBAR form                                                                           arlin 

Signatures:                                                                                       

Dispatcher [employer]                           EDLuis Messina MD MD cha Hall, Patricia RN                      RN   ph                                                   

                                                                                                  

Corrections: (The following items were deleted from the chart)                                    

14:53 13:38 Chest Single View+RAD.RAD.BRZ ordered. EDMS                                       EDMS

17:14 14:43 to , neurosurgery arlin                                                           ph  

                                                                                                  

**************************************************************************************************

## 2022-04-08 NOTE — XMS REPORT
Continuity of Care Document

                            Created on:2022



Patient:JORDAN VERDIN JR

Sex:Male

:1985

External Reference #:[id number]





Demographics







                          Address                   [address]



                                                    [place] [address]

 

                          Home Phone                [phone]

 

                          Work Phone                [phone]

 

                          Mobile Phone              [phone]

 

                          Email Address             DECLINED

 

                          Preferred Language        English

 

                          Marital Status            Unknown

 

                          Uatsdin Affiliation     Unknown

 

                          Race                      Unknown

 

                          Additional Race(s)        Black or 



                                                    Unavailable

 

                          Ethnic Group              Unknown









Author







                          Organization              [facility]

t

 

                          Address                   [address] [place] [address] [address]



                                                    [place] [address]

 

                          Phone                     [phone]









Support







                Name            Relationship    Address         Phone

 

                LAMBERT          Mother          [address] [phone]



                                                [place] [address] 

 

                Lambert          Unavailable     [phone]



                                                [place] [address] 

 

                Lambert          Unavailable     Unavailable     [phone]

 

                Lambert Jr       Unavailable     [phone]



                                                [place] [address] 

 

                one else per patient Unavailable     Unavailable     Unavailable

 

                Lambert          Mother          [address] [phone]

[phone]



                                                [place] [address] 

 

                PATIENT,  ONE ELSE PER Unavailable     Unavailable     Unavailab

le









Care Team Providers







                    Name                Role                Phone

 

                    Sharpless           Primary Care Physician [phone]

 

                    Jesusita               Attending Clinician Unavailable

 

                    SINGER              Attending Clinician Unavailable

 

                    Singer RANDHAWA           Attending Clinician [phone]

 

                    ELENA MCKEON      Attending Clinician Unavailable

 

                    Elena Carver  Attending Clinician [phone]

 

                    Thomas BOYD           Attending Clinician [phone]

 

                    DARWIN GARAY          Attending Clinician Unavailable

 

                    DARWIN Garay MD       Attending Clinician [phone]

 

                    Leo MEADOWS          Attending Clinician [phone]

 

                    MAXIM VILLASEÑOR          Attending Clinician Unavailable

 

                    MAXIM Woodard      Attending Clinician [phone]

 

                    VIV MARIA          Attending Clinician Unavailable

 

                    VIV Maria NP       Attending Clinician [phone]

 

                    Only,  Db Test      Attending Clinician Unavailable

 

                    Mario Alberto MEADOWS             Attending Clinician [phone]

 

                    MARIO ALBERTO                Attending Clinician Unavailable

 

                    Ibikunle FNP,  F    Attending Clinician [phone]

 

                    DANIA,  TWAN        Attending Clinician Unavailable

 

                    SILVESTRE             Attending Clinician Unavailable

 

                    RALPH TORRES         Attending Clinician Unavailable

 

                    ELENA MCKEON      Admitting Clinician Unavailable

 

                    LEO             Admitting Clinician Unavailable

 

                    Leo MEADOWS          Admitting Clinician [phone]

 

                    ERUMMAXIM          Admitting Clinician Unavailable

 

                    VIV MARIA          Admitting Clinician Unavailable

 

                    RANDALL              Admitting Clinician Unavailable

 

                    RALPH TORRES         Admitting Clinician Unavailable









Payers







           Payer Name Policy Type Policy Number Effective Date Expiration Date S

cameron

 

           [employer]            [id number]  2021            



           PLUS                             00:00:00              

 

           [employer] [facility]            [id number]  [id number]            



                                            00:00:00              







Advance Directives







           Directive  Decision   Effective  Termination Comments   Source



                                 Date       Date                  

 

           Healthcare Agents on N/A                                         Univ

ersity



           FileNameRelationshipHealcare                                       

      of [place]



           Agent                                                  Medical



           Relations[facility]CommunicationEC[facility]                                             



           [phone]                                             



           (Mobile)[phone] (Work)                                          

   







Problems







       Condition Condition Condition Status Onset  Resolution Last   Treating Co

mments 

Source



       Name   Details Category        Date   Date   Treatment Clinician        



                                                 Date                 

 

       Complicate Complicate Disease Active                              U

nivers



       d urinary d urinary               1-20                               ity 

of



       tract  tract                00:00:                             [place]



       infection infection               00                                 [facility]

 

       Hyperglyce Hyperglyce Disease Active                              C

[facility]



       jarvis    jarvis                  [address]                               [facility]



       without without               00:00:                             Medical



       ketosis ketosis               00                                 Center

 

       HEADACHE        Diagnosis Active 2018               M

emoria



                                             09:20:00               l



                HEADACHE               00:00:                             [facility]

n



                                   00                                 



                                                                      



               Active                                                  



                                                                      



                                                                      



                                                                      



                                                                      



                                                                      



                                                                      



                                                                      



                                                                      



                                                                      



                                                                      



                    [facility]                                                  



                                                                      



                                                                      

 

       SHUNT         Diagnosis Active 2018               Mem

oria



       MALFUNCTIO                                20:00:00               l



       N        SHUNT               00:00:                             Jose



              MALFUNCTIO               00                                 



              N                                                       



                                                                      



                 Active                                                  



                                                                      



                                                                      



              2018                                                  



                                                                      



                                                                      



                                                                      



                                                                      



                                                                      



                                                                      



                                                                      



                                                                      



                     [facility]                                                  



                                                                      



                                                                      

 

       ACUTE         Diagnosis Active 2018               Mem

oria



       HEADACHE                                09:20:00               l



                ACUTE               00:00:                             Jose



              HEADACHE               00                                 



                                                                      



                                                                      



              Active                                                  



                                                                      



                                                                      



              2018                                                  



                                                                      



                                                                      



                                                                      



                                                                      



                                                                      



                                                                      



                                                                      



                                                                      



                     [facility]                                                  



                                                                      



                                                                      

 

       Spina  Spina  Disease Active                              [facility]



       bifida bifida                                              [facility]



                                   00:00:                             [facility]



                                   00                                 Center

 

       Pyelonephr Pyelonephr Disease Active                              C

[facility]



       itis   itis                 6-11                               [facility]



                                   00:00:                             [facility]



                                   00                                 Center

 

       Morbid Morbid Disease Active                              [facility]



       obesity obesity               1-04                               ity of



       with body with body               00:00:                             [place]

[place]



       mass index mass index               00                                 Me

dical



       of 50 or of 50 or                                                  Branch



       higher higher                                                  

 

       Morbid Morbid Disease Active                              [facility]



       obesity obesity               1-04                               ity of



       with body with body               00:00:                             [place]

s



       mass index mass index               00                                 Me

dical



       of     of                                                      Branch



       40.0-49.9 40.0-49.9                                                  

 

       Spina         Problem                      2019               Memor

ia



       bifida,                                    14:14:02               l



       unspecifie   Spina                                                  Hilary

nn



       d      bifida,                                                  



              unspecifie                                                  



              d                                                       



                                                                      



                                                                      



                                                                      



                                                                      



                                                                      



                                                                      



                                                                      



                                                                      



              2019                                                  



                                                                      



                                                                      



                                                                      



                                                                      



                 [facility]                                                  



                                                                      



                                                                      

 

       Nausea        Problem                      2019               Memor

ia



       with                                      14:14:02               l



       vomiting,   Nausea                                                  [place]ilary

nn



       unspecifie with                                                    



       d      vomiting,                                                  



              unspecifie                                                  



              d                                                       



                                                                      



                                                                      



                                                                      



                                                                      



                                                                      



                                                                      



                                                                      



                                                                      



              2019                                                  



                                                                      



                                                                      



                                                                      



                                                                      



                 [facility]                                                  



                                                                      



                                                                      

 

       Diplopia        Problem                      2019               Mem

oria



                                                 14:14:02               l



                Diplopia                                                  Miguel

n



                                                                      



                                                                      



                                                                      



                                                                      



                                                                      



                                                                      



                                                                      



                                                                      



                                                                      



              2019                                                  



                                                                      



                                                                      



                                                                      



                                                                      



                 [facility]                                                  



                                                                      



                                                                      

 

       Cerebral        Problem                      2019               Mem

oria



       palsy,                                    14:14:02               l



       unspecifie   Cerebral                                                  He

rmann



       d      palsy,                                                  



              unspecifie                                                  



              d                                                       



                                                                      



                                                                      



                                                                      



                                                                      



                                                                      



                                                                      



                                                                      



                                                                      



              2019                                                  



                                                                      



                                                                      



                                                                      



                                                                      



                 [facility]                                                  



                                                                      



                                                                      

 

       Acquired        Problem                      2019               Mem

oria



       absence of                                    14:14:02               l



       other    Acquired                                                  Miguel

n



       specified absence of                                                  



       parts of other                                                   



       digestive specified                                                  



       tract  parts of                                                  



              digestive                                                  



              tract                                                   



                                                                      



                                                                      



                                                                      



                                                                      



                                                                      



                                                                      



                                                                      



                                                                      



                                                                      



              2019                                                  



                                                                      



                                                                      



                                                                      



                                                                      



                 [facility]                                                  



                                                                      



                                                                      

 

       Nicotine        Problem                      2019               Mem

oria



       dependence                                    14:14:02               l



       ,        Nicotine                                                  Miguel

n



       cigarettes dependence                                                  



       ,      ,                                                       



       uncomplica cigarettes                                                  



       huma    ,                                                       



              uncomplica                                                  



              huma                                                     



                                                                      



                                                                      



                                                                      



                                                                      



                                                                      



                                                                      



                                                                      



                                                                      



              2019                                                  



                                                                      



                                                                      



                                                                      



                                                                      



                 [facility]                                                  



                                                                      



                                                                      

 

       Presence        Problem                      2019               Mem

oria



       of                                        14:14:02               l



       cerebrospi   Presence                                                  He

rmann



       nal fluid of                                                      



       drainage cerebrospi                                                  



       device nal fluid                                                  



              drainage                                                  



              device                                                  



                                                                      



                                                                      



                                                                      



                                                                      



                                                                      



                                                                      



                                                                      



                                                                      



                                                                      



              2019                                                  



                                                                      



                                                                      



                                                                      



                                                                      



                 [facility]                                                  



                                                                      



                                                                      

 

       Allergy        Problem                      2019               [facility]

rajeev



       status to                                    14:14:02               l



       other    Allergy                                                  Jose



       antibiotic status to                                                  



       agents other                                                   



       status antibiotic                                                  



              agents                                                  



              status                                                  



                                                                      



                                                                      



                                                                      



                                                                      



                                                                      



                                                                      



                                                                      



                                                                      



                                                                      



              2019                                                  



                                                                      



                                                                      



                                                                      



                                                                      



                 [facility]                                                  



                                                                      



                                                                      

 

       Allergy        Problem                      2019               [facility]

rajeev



       status to                                    14:14:02               l



       other    Allergy                                                  [place]



       drugs, status to                                                  



       medicament other                                                   



       s and  drugs,                                                  



       biological medicament                                                  



       substances s and                                                   



       status biological                                                  



              substances                                                  



              status                                                  



                                                                      



                                                                      



                                                                      



                                                                      



                                                                      



                                                                      



                                                                      



                                                                      



                                                                      



              2019                                                  



                                                                      



                                                                      



                                                                      



                                                                      



                 [facility]                                                  



                                                                      



                                                                      

 

       Allergy        Problem                      2019               [facility]

rajeev



       status to                                    14:14:02               l



       narcotic   Allergy                                                  [place]

nn



       agent  status to                                                  



       status narcotic                                                  



              agent                                                   



              status                                                  



                                                                      



                                                                      



                                                                      



                                                                      



                                                                      



                                                                      



                                                                      



                                                                      



                                                                      



              2019                                                  



                                                                      



                                                                      



                                                                      



                                                                      



                 [facility]                                                  



                                                                      



                                                                      

 

       Asthma        Problem Resolve               2019               [facility]

rajeev



       (disorder)               d                    01:05:55               l



                Asthma                                                  Jose



              (disorder)                                                  



                                                                      



                                                                      



               Resolved                                                  



                                                                      



                                                                      



                                                                      



                                                                      



                Problem                                                  



                                                                      



                                                                      



              2019                                                  



                                                                      



                                                                      



                                                                      



                                                                      



                 [facility]                                                  



                                                                      



                                                                      

 

       Bronchitis        Problem Resolve               2019               

[facility]



       (disorder)               d                    01:05:55               l



                                                                      [place]



              Bronchitis                                                  



              (disorder)                                                  



                                                                      



                                                                      



               Resolved                                                  



                                                                      



                                                                      



                                                                      



                                                                      



                Problem                                                  



                                                                      



                                                                      



              2019                                                  



                                                                      



                                                                      



                                                                      



                                                                      



                 [facility]                                                  



                                                                      



                                                                      

 

       Cerebral        Problem Resolve               2019               Me

moria



       palsy                d                    01:05:55               l



       (disorder)   Cerebral                                                  He

rmann



              palsy                                                   



              (disorder)                                                  



                                                                      



                                                                      



               Resolved                                                  



                                                                      



                                                                      



                                                                      



                                                                      



                Problem                                                  



                                                                      



                                                                      



              2019                                                  



                                                                      



                                                                      



                                                                      



                                                                      



                 [facility]                                                  



                                                                      



                                                                      

 

       Hydrocepha        Problem Resolve               2019               

[facility]



       ozzy                  d                    01:05:55               l



       (disorder)                                                         Miguel

n



              Hydrocepha                                                  



              ozzy                                                     



              (disorder)                                                  



                                                                      



                                                                      



               Resolved                                                  



                                                                      



                                                                      



                                                                      



                                                                      



                Problem                                                  



                                                                      



                                                                      



              2019                                                  



                                                                      



                                                                      



                                                                      



                                                                      



                 [facility]                                                  



                                                                      



                                                                      

 

       Osteomyeli        Problem Resolve               2019               

[facility]



       tis                  d                    01:05:55               l



       (disorder)                                                         Miguel

n



              Osteomyeli                                                  



              tis                                                     



              (disorder)                                                  



                                                                      



                                                                      



               Resolved                                                  



                                                                      



                                                                      



                                                                      



                                                                      



                Problem                                                  



                                                                      



                                                                      



              2019                                                  



                                                                      



                                                                      



                                                                      



                                                                      



                 [facility]                                                  



                                                                      



                                                                      

 

       Acute pain        Problem Active               2019               M

emoria



       (finding)                                    01:05:55               l



                Acute                                                  Jose



              pain                                                    



              (finding)                                                  



                                                                      



                                                                      



              Active                                                  



                                                                      



                                                                      



                                                                      



                                                                      



              Problem                                                  



                                                                      



                                                                      



              2019                                                  



                                                                      



                                                                      



                                                                      



                                                                      



                 [facility]                                                  



                                                                      



                                                                      

 

       Headache        Problem Active               2019               Mem

oria



       (finding)                                    01:05:55               l



                Headache                                                  Miguel

n



              (finding)                                                  



                                                                      



                                                                      



              Active                                                  



                                                                      



                                                                      



                                                                      



                                                                      



              Problem                                                  



                                                                      



                                                                      



              2019                                                  



                                                                      



                                                                      



                                                                      



                                                                      



                 [facility]                                                  



                                                                      



                                                                      







History of Past Illness







       Condition Condition Condition Status Onset  Resolution Last   Treating Co

mments 

Source



       Name   Details Category        Date   Date   Treatment Clinician        



                                                 Date                 

 

       Headache        Problem        2019              

 Memoria



                                   1-08   14:14:02 14:14:02               l



                Headache               06:00:                             Miguel

n



                                   00                                 



                                                                      



                                                                      



                                                                      



                                                                      



              2018                                                  



                                                                      



                                                                      



                                                                      



                                                                      



                 [facility]                                                  



                                                                      



                                                                      







Allergies, Adverse Reactions, Alerts







       Allergy Allergy Status Severity Reaction(s) Onset  Inactive Treating Comm

ents 

Source



       Name   Type                        Date   Date   Clinician        

 

       Amoxicil Propensi Active        Hives                        [facility]

s



       bryn    ty to                       7                        ity of



              adverse                      00:00:                      [place]



              reaction                      00                          Medical



              s                                                       Branch

 

       AMOXICIL DRUG   Active        Hives                        [facility]



       BRYN    INGREDI                      7-27                        ity of



                                          00:00:                      [place]



                                          00                          Medical



                                                                      Branch

 

       Metoclop Propensi Active        Nausea -                      [facility]

s



       ramide ty to                and/or 2-20                        ity of



       Hcl    adverse               Vomiting 00:00:                      [place]



              reaction                      00                          Medical



              s                                                       Branch

 

       METOCLOP DRUG   Active        N/V    -                      [facility]



       RAMIDE INGREDI                      2-20                        ity of



       HCL                                00:00:                      [place]



                                          00                          Medical



                                                                      Branch

 

       Sulfamet Propensi Active        Hives  2017-0                      [facility]



       hoxazole ty to                       6-11                        [facility]



       [facility] adverse                      00:00:                      [facility]



       oprim  reaction                      00                          Center



              s                                                       

 

       Levoflox Propensi Active        Hives  2017-0                      [facility]



       acin   ty to                       6-11                        [facility]



              adverse                      00:00:                      [facility]



              reaction                      00                          Center



              s                                                       

 

       SULFAMET Allergy Active High   Hives  2017-0                      [facility]



       HOXAZOLE                             6-11                        [facility]



       [facility]                             00:00:                      [facility]



       OPRIM                              00                          Center

 

       LEVOFLOX Allergy Active High   Hives  2017-0                      [facility]



       ACIN                               6-11                        [facility]



                                          00:00:                      [facility]



                                          00                          Center

 

       MORPHINE Allergy Active High   Hives  2017-0                      [facility]



                                          [facility]                        [facility]



                                          [facility]                      [facility]



                                          00                          Center

 

       KETOROLA Allergy Active High   Rash   2017-0                      [facility]



       C                                  [facility]11                        [facility]



                                          [facility]                      [facility]



                                          00                          Center

 

       VANCOMYC Allergy Active High   Rash   2017-0                      [facility]



       IN                                 6-11                        [facility]



       ANALOGUE                             00:00:                      Medical



       S                                  00                          Center

 

       SESAME Allergy Active        Hives  2017-0                      [facility]



       SEED                               6-11                        [facility]



                                          [facility]                      [facility]



                                          00                          Center

 

       Morphine Propensi Active        Hives  2017-0                      [facility]



              ty to                       6-11                        [facility]



              adverse                      00:00:                      Medical



              reaction                      00                          Center



              s                                                       

 

       ONDANSET Allergy Active        N\T\V  2017-0                      [facility]



       EVIN HCL                             6-11                        [facility]



       (PF)                               00:00:                      Medical



                                          00                          Center

 

       Sesame Propensi Active        Hives  2017-0                      [facility]



       Seed   ty to                       6-11                        [facility]



              adverse                      00:00:                      Medical



              reaction                      00                          Center



              s                                                       

 

       Ketorola Propensi Active        Rash   2017-0                      [facility]



       c      ty to                       6-11                        [facility]



              adverse                      00[facility]00:                      Medical



              reaction                      00                          Center



              s                                                       

 

       Vancomyc Propensi Active        Rash   2017-0                      [facility]



       in     ty to                       6-11                        [facility]



       Analogue adverse                      00:00:                      Medical



       s      reaction                      00                          Center



              s                                                       

 

       Ondanset Propensi Active        Nausea And 2017-0                      [facility]



       evin Hcl ty to                Vomiting 6-11                        [facility]



       (Pf)   adverse                      00:00:                      Medical



              reaction                      00                          Center



              s                                                       

 

       Sulfa  Propensi Active        Other - See                       Uni

vers



       (Sulfona ty to                comments 1-04                        ity of



       mide   adverse                      00:00:                      [place]



       Antibiot reaction                      00                          Medica

l



       ics)   s                                                       Branch

 

       Sulfamet Propensi Active        Other - See                       U

nivers



       hoprim ty to                comments -                        ity of



       Ds     adverse                      00:00:                      [place]



              reaction                      00                          Medical



              s                                                       Branch

 

       Vancomyc Propensi Active        Other - See                       U

nivers



       in     ty to                comments 1-04                        ity of



              adverse                      00:00:                      [place]



              reaction                      00                          Medical



              s                                                       Branch

 

       SULFA  Drug   Active        Other-Cmnt                       Univer

s



       (SULFONA Class                       1-04                        ity of



       MIDE                               00:00:                      [place]



       ANTIBIOT                             00                          Medical



       ICS)                                                           Branch

 

       SULFAMET DRUG   Active        Other-Cmnt                       Univ

ers



       HOPRIM                                                     ity of



       DS                                 00:00:                      [place]



                                          00                          Medical



                                                                      Branch

 

       VANCOMYC DRUG   Active        Other-Cmnt                       Univ

ers



       IN     INGREDI                      -                        ity of



                                          00:00:                      [place]



                                          00                          Medical



                                                                      Branch

 

       Sulfamet Propensi Active        Rash                         Univer

s



       hoxazole ty to                       7-19                        ity of



              adverse                      00:00:                      [place]



              reaction                      00                          Medical



              s                                                       Branch

 

       Ondanset Propensi Active        Nausea                       Univer

s



       evin Hcl ty to                and/or                         ity of



       (Pf)   adverse               Vomiting 00:00:                      [place]



              reaction                      00                          Medical



              s                                                       Branch

 

       SULFAMET DRUG   Active        Rash                         Univers



       HOXAZOLE INGREDI                      7-19                        ity of



                                          00:00:                      [place]



                                          00                          Medical



                                                                      Branch

 

       ONDANSET DRUG   Active        N/V                          Univers



       EVIN HCL                             7-19                        ity of



       (PF)                               00:00:                      [place]



                                          00                          Medical



                                                                      Branch

 

       Levoflox Propensi Active        Hives                        Univer

s



       acin   ty to                       5-23                        ity of



              adverse                      00:00:                      [place]



              reaction                      00                          Medical



              s                                                       Branch

 

       Morphine Propensi Active        Hives                        Univer

s



              ty to                       5-23                        ity of



              adverse                      00:00:                      [place]



              reaction                      00                          Medical



              s                                                       Branch

 

       Ketorola Propensi Active        Nausea                       Univer

s



       c      ty to                and/or 5-                        ity of



       Trometha adverse               Vomiting 00:00:                      [place]



       mine   reaction                      00                          Medical



              s                                                       Branch

 

       LEVOFLOX DRUG   Active        Hives                        Univers



       ACIN   INGREDI                      5-23                        ity of



                                          00:00:                      [place]



                                          00                          [facility]

 

       MORPHINE DRUG   Active        Hives                        Univers



              INGREDI                                              ity of



                                          00:00:                      [place]



                                          00                          [facility]

 

       KETOROLA DRUG   Active        N/V                          Univers



       C      INGREDI                                              ity of



       TROMETHA                             00:00:                      [place]



       MINE                               00                          [facility]

 

       Morphine Adverse Active        Info Not                             [facility]

t



       Sulfate Reaction               Available                             [facility]

s -



       ER                                                             [facility]



                                                                      l



                                                                      Outpati



                                                                      ent



                                                                      Clinics

 

       Levaquin Adverse Active        Info Not                             [facility]

[facility]



              Reaction               Available                             [facility]

 -



                                                                      [facility]



                                                                      l



                                                                      Outpati



                                                                      ent



                                                                      Clinics

 

       Zofran Adverse Active        Info Not                             [facility]



              Reaction               Available                             [facility]

 -



                                                                      [facility]



                                                                      l



                                                                      Outpati



                                                                      ent



                                                                      Clinics

 

       Minocin Minocin Active                                           [facility]



                                                                      l



                                                                      [place]

 

       Zofran Zofran Active                                           [facility]



                                                                      l



                                                                      [place]

 

       Levaquin Levaquin Active                                           [facility]

a



                                                                      l



                                                                      [place]

 

       Bactrim Bactrim Active                                           [facility]



                                                                      l



                                                                      [place]

 

       amoxicil amoxicil Active                                           [facility]

a



       bryn    bryn                                                     l



                                                                      [place]

 

       morphine morphine Active                                           [facility]

a



                                                                      l



                                                                      [place]

 

       Toradol Toradol Active                                           [facility]



                                                                      l



                                                                      [place]







Social History







           Social Habit Start Date Stop Date  Quantity   Comments   Source

 

           History of tobacco                       Cigarette Smoker            

[facility] -



           use                                                    [facility]

 

           Exposure to                       Not sure              [facility]



           SARS-CoV-2 (event)                                             [facility]

 

           Alcohol intake 2022 0 /d                  [facility]

 of



                      00:00:00   00:00:00                         [facility]

 

           Cigarettes smoked 2017                       [facility] -



           current (pack per 00:00:00   00:00:00                         Medical

 Center



           day) - Reported                                             

 

           Tobacco use and 2017 Never used            [facility]

y of



           exposure   00:00:00   00:00:00                         [facility]

 

           Sex Assigned At 1985                       [facility]

y of



           Birth      00:00:00   00:00:00                         [facility]









                Smoking Status  Start Date      Stop Date       Source

 

                Current every day smoker 2017 00:00:00                 Uni

versity of [facility]







Medications







       Ordered Filled Start  Stop   Current Ordering Indication Dosage Frequency

 Signature

                    Comments            Components          Source



     Medication Medication Date Date Medication? Clinician                (SIG) 

          



     Name Name                                                   

 

     NaCl 0.9%      2022- No             1000mL      at 999           Uni

vers



     (NS) bolus      4-04 04-04                          mL/hr,           ity of



     infusion      05:00: 05:59                          1,000 mL,           Eric

as



     1,000 mL      00   :00                           IV             Medical



                                                  Piggyback,           Branch



                                                  ONCE, 1           



                                                  dose, On           



                                                  Mon 22           



                                                  at 0000,           



                                                  STAT           

 

     diphenhydrA       No             25mg      25 mg,           Uni

vers



     MINE                                Slow IV           ity of



     (BENADRYL)      05:00: 04:47                          Push,           [place]



     injection      00   :00                           ONCE, 1           Medical



     25 mg                                         dose, On           Branch



                                                  Mon 22           



                                                  at 0000,           



                                                  STAT           

 

     haloperidol       No             2.5mg      2.5 mg,           U

nivers



     lactate                                Intravenou           ity o

f



     (HALDOL)      05:00: 04:47                          s, ONCE, 1           [place]

[place]



     injection      00   :00                           dose, On           Medica

l



     2.5 mg                                         Mon 22           Branch



                                                  at 0000,           



                                                  STAT           

 

     butalbital-       No             2{tbl}      2 tablet,         

  [facility]



     acetaminoph      3-18 03-18                          Oral,           ity of



     en-caff      03:45: 03:10                          ONCE, 1           [place]



     (ESGIC)      00   :00                           dose, On           Medical



     -40                                         Thu            Branch



     mg tablet 2                                         3/17/22 at           



     tablet                                         2245, ASAP           

 

     NaCl 0.9%       No             500mL      at 999           Univ

ers



     (NS) bolus      3-18 03-18                          mL/hr, 500           it

y of



     infusion      02:30: 03:17                          mL, IV           [place]



     500 mL      00   :00                           Infusion,           Medical



                                                  ONCE, 1           Branch



                                                  dose, On           



                                                  u            



                                                  3/17/22 at           



                                                  2130, STAT           

 

     diphenhydrA       No             25mg      25 mg,           Uni

vers



     MINE      3-18 03-18                          Slow IV           ity of



     (BENADRYL)      02:30: 01:46                          Push,           [place]



     injection      00   :00                           ONCE, 1           Medical



     25 mg                                         dose, On           Branch



                                                  Thu            



                                                  3/17/22 at           



                                                  2130, STAT           

 

     magnesium       No             2g        2 g, IV           Univ

ers



     sulfate in      3-18 03-18                          Piggyback,           it

y of



     water 2      02:15: 03:17                          Administer           Eric

as



     gram/50 mL      00   :00                           over 30           Medica

l



     (4 %)                                         Minutes,           Branch



     infusion 2                                         ONCE, 1           



     g                                            dose, On           



                                                  u            



                                                  3/17/22 at           



                                                  2115,           



                                                  Routine           

 

     HYDROmorpho      2022-0 2022- No             .5mg      0.5 mg,           Un

yoselyn



     ne                                  Slow IV           ity of



     (DILAUDID)      01:30: 01:25                          Push,           [place]



     injection      00   :00                           ONCE, 1           Medical



     0.5 mg                                         dose, On           Branch



                                                  22 at           



                                                  1930,           



                                                  Routine<br           



                                                  >Use           



                                                  approved           



                                                  by             



                                                  (Faculty):           



                                                  ADC            



                                                  PROVIDER           

 

     levoFLOXaci      2022- Yes       [id number] 750mg      Take 1        

   [facility]



     n 750 mg                                tablet by           ity o

f



     tablet      00:00: 05:59                          mouth           [place]



               00   :00                           daily for           Medical



                                                  4 days.           Branch

 

     levoFLOXaci      2022- Yes       [id number] 750mg      Take 1        

   [facility]



     n 750 mg                                tablet by           ity o

f



     tablet      00:00: 05:59                          mouth           [place]



               00   :00                           daily for           Medical



                                                  4 days.           [place]

 

     OXYCODONE            Yes                      Take  by           [facility]

ers



     HCL/ACETAMI      1-25                               mouth.           ity of



     NOPHEN      19:49:                                              [place]



     (PERCOCET      27                                                Medical



     ORAL)                                                        [place]

 

     OXYCODONE            Yes                      Take  by           [facility]

ers



     HCL/ACETAMI      1-25                               mouth.           ity of



     NOPHEN      19:49:                                              [place]



     (PERCOCET      27                                                Medical



     ORAL)                                                        [place]

 

     OXYCODONE            Yes                      Take  by           [facility]

ers



     HCL/ACETAMI      1-25                               mouth.           ity of



     NOPHEN      19:49:                                              [place]



     (PERCOCET      27                                                Medical



     ORAL)                                                        [place]

 

     OXYCODONE            Yes                      Take  by           [facility]

ers



     HCL/ACETAMI      1-25                               mouth.           ity of



     NOPHEN      19:49:                                              [place]



     (PERCOCET      27                                                Medical



     ORAL)                                                        [place]

 

     vancomycin      2022- Yes            125mg      125 mg,           Un

yoselyn



     (VANCOCIN)                                Oral, QID,           it

y of



     capsule 125      18:00: 21:59                          25 doses,           

[place]



     mg        00   :00                           First dose           Medical



                                                  (after           Branch



                                                  last           



                                                  modificati           



                                                  on) on 22 at           



                                                  1200, Last           



                                                  dose on           



                                                  22 at           



                                                  1200,           



                                                  ASAP<br>Fa           



                                                  culty           



                                                  member           



                                                  approving           



                                                  Non-formul           



                                                  maryanne            



                                                  medication           



                                                  :              



                                                  MEGADC<br&           



                                                  gt;Reason           



                                                  for            



                                                  non-formul           



                                                  maryanne use:           



                                                  SPECIFIC           



                                                  INDICATION           



                                                  FOR            



                                                  NONFORMULA           



                                                  RY             



                                                  PRODUCT<br           



                                                  >Reason           



                                                  for            



                                                  Anti-Infec           



                                                  tive:           



                                                  Documented           



                                                  Infection<           



                                                  br>Documen           



                                                  huma            



                                                  Infection           



                                                  Site:           



                                                  Abdominal<           



                                                  br>Duratio           



                                                  n of           



                                                  Therapy:           



                                                  14 days           

 

     levoFLOXaci            Yes            750mg      750 mg,           Un

[facility]



     n         1-25                               Oral,           ity of



     (LEVAQUIN)      15:00:                               DAILY,           [place]



     tablet 750      00                                 First dose           Med

ical



     mg                                           (after           [facility]



                                                  last           



                                                  modificati           



                                                  on) on 22 at           



                                                  0900,           



                                                  Until           



                                                  Discontinu           



                                                  ed,            



                                                  ASAP<br>Re           



                                                  ason for           



                                                  Anti-Infec           



                                                  tive:           



                                                  Empiric           



                                                  Therapy           



                                                  for            



                                                  Suspected           



                                                  Infection<           



                                                  br>Empiric           



                                                  Therapy           



                                                  Site:           



                                                  Urine<br>D           



                                                  uration of           



                                                  therapy:           



                                                  72 hours           

 

     lactobacill      2022- Yes       [id number] .5mg      Take 1         

  [facility]



     us        --25                          tablet by           ity of



     acidophilus      00:00: 05:59                          mouth 2           [facility]

[place]



               00   :00                           (two)           [facility]



                                                  times           [facility]



                                                  daily for           



                                                  30 days.           

 

     lactobacill      2022- Yes       [id number] .5mg      Take 1         

  [facility]



     us        -25                          tablet by           ity of



     acidophilus      00:00: 05:59                          mouth 2           [facility]

[place]



               00   :00                           (two)           Medical



                                                  times           [facility]



                                                  daily for           



                                                  30 days.           

 

     vancomycin      2022- Yes       [id number] 125mg      Take 1         

  [facility]



     125 mg      -                          capsule by           ity of



     capsule      00:00: 05:59                          mouth 4           [place]



               00   :00                           (four)           Medical



                                                  times           [facility]



                                                  daily for           



                                                  5 days.           

 

     vancomycin      2022- Yes       [id number] 125mg      Take 1         

  [facility]



     125 mg      -                          capsule by           ity of



     capsule      00:00: 05:59                          mouth 4           [place]



               00   :00                           (four)           Medical



                                                  times           [facility]



                                                  daily for           



                                                  5 days.           

 

     traMADoL            Yes            50mg      50 mg,           [facility]



     (ULTRAM)      1-24                               Oral,           ity of



     tablet 50      21:26:                               Q6HPRN,           [place]



     mg        10                                 Starting           Medical



                                                  on 22 at           



                                                  1526,           



                                                  Until           



                                                  Discontinu           



                                                  ed,            



                                                  Routine,           



                                                  Pain           



                                                  (scale           



                                                  4-6)           

 

     levoFLOXaci      2022- No             250mg      250 mg,           U

n[facility]



     n         1-24 01-24                          Oral, Q24H           ity of



     (LEVAQUIN)      19:30: 23:18                          ABX, First           

[place]



     tablet 250      00   :17                           dose           Medical



     mg                                           (after           [facility]



                                                  last           



                                                  modificati           



                                                  on) on Mon           



                                                  22 at           



                                                  1330,           



                                                  Until           



                                                  Discontinu           



                                                  ed,            



                                                  ASAP<br>Re           



                                                  ason for           



                                                  Anti-Infec           



                                                  tive:           



                                                  Empiric           



                                                  Therapy           



                                                  for            



                                                  Suspected           



                                                  Infection<           



                                                  br>Empiric           



                                                  Therapy           



                                                  Site:           



                                                  Urine<br>D           



                                                  uration of           



                                                  therapy:           



                                                  72 hours           

 

     HYDROmorpho      2022- No             .5mg      0.5 mg,           Un

[facility]



     ne                                  Slow IV           ity of



     (DILAUDID)      20:45: 18:23                          Push,           [place]



     injection      00   :44                           Q6HPRN,           Medical



     0.5 mg                                         Starting           [facility]



                                                  on Sun           



                                                  22 at           



                                                  1445,           



                                                  Until Mon           



                                                  22 at           



                                                  1223,           



                                                  Routine,           



                                                  Pain           



                                                  (scale           



                                                  7-10),           



                                                  breakthrou           



                                                  gh             



                                                  pain<br>Us           



                                                  e approved           



                                                  by             



                                                  (Faculty):           



                                                  ADC            



                                                  PROVIDER           

 

     oxyCODONE-a            Yes            2{tbl}      2 tablet,          

 [facility]



     cetaminophe                                     Oral,           ity of



     n         20:39:                               Q6HPRN,           [place]



     (PERCOCET)      03                                 Starting           Medic

al



     5-325 mg                                         on Sun           Branch



     per tablet                                         22 at           



     2 tablet                                         1439,           



                                                  Until           



                                                  Discontinu           



                                                  ed,            



                                                  Routine,           



                                                  Pain           



                                                  (scale           



                                                  7-10)           

 

     HYDROmorpho      2022- No             .5mg      0.5 mg,           Un

[place]



     ne                                  Slow IV           ity of



     (DILAUDID)      00:00: 23:41                          Push,           [place]



     injection      00   :00                           ONCE, 1           Medical



     0.5 mg                                         dose, On           [facility]



                                                  Sat            



                                                  22 at           



                                                  1800,           



                                                  Routine<br           



                                                  >Use           



                                                  approved           



                                                  by             



                                                  (Faculty):           



                                                  ADC            



                                                  PROVIDER           

 

     ertapenem      2022- No             1000mg      1,000 mg,           

[facility]



     (INVANZ)                                IV             ity of



     1,000 mg in      15:45: 18:30                          Piggyback,          

 [place]



     NaCl 0.9%      00   :00                           Q24H ABX,           Medic

al



     (NS) 50 mL                                         First dose           Bra

[facility]



     MINI-BAG                                         on Sat           



                                                  22 at           



                                                  0945,           



                                                  Until           



                                                  Discontinu           



                                                  ed,            



                                                  Administer           



                                                  over 30           



                                                  Minutes,           



                                                  50             



                                                  mL<br>Reas           



                                                  on for           



                                                  Anti-Infec           



                                                  tive:           



                                                  Empiric           



                                                  Therapy           



                                                  for            



                                                  Suspected           



                                                  Infection<           



                                                  br>Empiric           



                                                  Therapy           



                                                  Site:           



                                                  Urine<br>D           



                                                  uration of           



                                                  therapy:           



                                                  72             



                                                  hours<br>R           



                                                  estricted           



                                                  use            



                                                  approved           



                                                  by: ADC           



                                                  PROVIDER           

 

     lactobacill            Yes            .5mg      0.5 mg,           Uni

vers



     us                                       Oral, BID,           ity of



     acidophilus      02:00:                               First dose           

[place]



     tablet 0.5      00                                 on Fri           Medical



     mg                                           22 at           [facility]



                                                  2000,           



                                                  Until           



                                                  Discontinu           



                                                  ed,            



                                                  Routine           

 

     vancomycin      2022- No             250mg      250 mg,           Un

[place]



     (VANCOCIN)                                Oral, QID,           it

y of



     capsule 250      02:00: 16:20                          First dose          

 [place]



     mg        00   :40                           (after           Medical



                                                  last           [facility]



                                                  reorder)           



                                                  on Fri           



                                                  22 at           



                                                  2000,           



                                                  Until           



                                                  Discontinu           



                                                  ed,            



                                                  ASAP&lt;br           



                                                  >Faculty           



                                                  member           



                                                  approving           



                                                  Non-formul           



                                                  maryanne            



                                                  medication           



                                                  :              



                                                  MEGADC<br>           



                                                  Reason for           



                                                  non-formul           



                                                  maryanne use:           



                                                  SPECIFIC           



                                                  INDICATION           



                                                  FOR            



                                                  NONFORMULA           



                                                  RY             



                                                  PRODUCT<br           



                                                  >Reason           



                                                  for            



                                                  Anti-Infec           



                                                  tive:           



                                                  Documented           



                                                  Infection<           



                                                  br>Documen           



                                                  huma            



                                                  Infection           



                                                  Site:           



                                                  Abdominal<           



                                                  br>Duratio           



                                                  n of           



                                                  Therapy:           



                                                  14 days           

 

     vancomycin      2022- No             250mg      250 mg,           Un

[place]



     (VANCOCIN)                                Oral,           ity of



     capsule 250      00:45: 00:34                          ONCE, 1           [facility]

[place]



     mg        00   :00                           dose, On           Medical



                                                  Fri            Branch



                                                  22 at           



                                                  1845,           



                                                  ASAP<br>Fa           



                                                  culty           



                                                  member           



                                                  approving           



                                                  Non-formul           



                                                  maryanne            



                                                  medication           



                                                  :              



                                                  MEGADC<br>           



                                                  Reason for           



                                                  non-formul           



                                                  maryanne use:           



                                                  SPECIFIC           



                                                  INDICATION           



                                                  FOR            



                                                  NONFORMULA           



                                                  RY             



                                                  PRODUCT<br           



                                                  >Reason           



                                                  for            



                                                  Anti-Infec           



                                                  tive:           



                                                  Documented           



                                                  Infection<           



                                                  br>Documen           



                                                  huma            



                                                  Infection           



                                                  Site:           



                                                  Abdominal<           



                                                  br>Duratio           



                                                  n of           



                                                  Therapy:           



                                                  14 days           

 

     lidocaine      2022- No             5mL       5 mL,           Univer

s



     1% (PF)                                Subcutaneo           ity o

f



     (XYLOCAINE)      23:45: 02:25                          us, ONCE,           

[place]



     injection 5      00   :00                           1 dose, On           Me

dical



     mL                                           Fri            [facility]anch



                                                  22 at           



                                                  1745,           



                                                  Routine           

 

     NaCl 0.9%            Yes            10mL      10 mL,           Univer

s



     (NS)                                     Slow IV           ity of



     injection      23:38:                               Push, PRN,           [facility]

[place]



     10 mL      41                                 Starting           Medical



                                                  on Fri           [facility]



                                                  22 at           



                                                  1738,           



                                                  Until           



                                                  Discontinu           



                                                  ed,            



                                                  Routine,           



                                                  line           



                                                  maintenanc           



                                                  e              

 

     meropenem      2022- No             1g        1 g, IV           Univ

ers



     (MERREM)                           Piggyback,           it

y of



     g in NaCl      23:15: 14:33                          Q8H ABX,           Eric

as



     0.9% (NS)      00   :35                           First dose           Medi

sarah



     100 mL                                         (after           [facility]



     MINI-BAG                                         last           



                                                  modificati           



                                                  on) on u           



                                                  22 at           



                                                  1715,           



                                                  Until           



                                                  Discontinu           



                                                  ed,            



                                                  Administer           



                                                  over 60           



                                                  Minutes,           



                                                  100            



                                                  mL<br>Rest           



                                                  ricted use           



                                                  approved           



                                                  by: [facility]           



                                                  PROVIDER<b           



                                                  r&gt;Reaso           



                                                  n for           



                                                  Anti-Infec           



                                                  tive:           



                                                  Documented           



                                                  Infection<           



                                                  br>Documen           



                                                  huma            



                                                  Infection           



                                                  Site:           



                                                  Urine<br>D           



                                                  uration of           



                                                  Therapy: 7           



                                                  days           

 

     enoxaparin            Yes            40mg      40 mg,           Unive

rs



     (LOVENOX)                                     Subcutaneo           ity 

of



     injection      23:00:                               us, DAILY,           [facility]

[place]



     40 mg      00                                 First dose           Medical



                                                  on Thu           [facility]



                                                  22 at           



                                                  1700,           



                                                  Until           



                                                  Discontinu           



                                                  ed,            



                                                  Routine           

 

     meropenem      2022- No             1g        1 g, IV           Univ

ers



     (MERREM)                           Piggyback,           it

y of



     g in NaCl      16:00: 20:33                          Q8H ABX,           Eric

as



     0.9% (NS)      00   :04                           First dose           Medi

sarah



     100 mL                                         (after           [facility]



     MINI-BAG                                         last           



                                                  reorder)           



                                                  on u           



                                                  22 at           



                                                  1000,           



                                                  Until           



                                                  Discontinu           



                                                  ed,            



                                                  Administer           



                                                  over 60           



                                                  Minutes,           



                                                  100            



                                                  mL<br>Rest           



                                                  ricted use           



                                                  approved           



                                                  by: [facility]           



                                                  PROVIDER<b           



                                                  r>Reason           



                                                  for            



                                                  Anti-Infec           



                                                  tive:           



                                                  Documented           



                                                  Infection<           



                                                  br>Documen           



                                                  huma            



                                                  Infection           



                                                  Site:           



                                                  Urine<br>D           



                                                  uration of           



                                                  Therapy:           



                                                  Other (see           



                                                  Comments)           

 

     HYDROmorpho      2022- No             .5mg      0.5 mg,           Un

[facility]



     ne                                  Slow IV           ity of



     (DILAUDID)      14:29: 20:41                          Push,           [place]



     injection      58   :17                           Q4HPRN,           Medical



     0.5 mg                                         Starting           [facility]



                                                  on u           



                                                  22 at           



                                                  0829,           



                                                  Until Sun           



                                                  22 at           



                                                  1441,           



                                                  Routine,           



                                                  Pain           



                                                  (scale           



                                                  7-10)<br>U           



                                                  se             



                                                  approved           



                                                  by             



                                                  (Faculty):           



                                                  [facility]            



                                                  PROVIDER           

 

     proMETHazin            Yes            25mg      25 mg,           Univ

ers



     e                                        Oral,           ity of



     (PHENERGAN)      09:42:                               Q4HPRN,           Eric

as



     tablet 25      07                                 Starting           Medica

l



     mg                                           on Thu           [facility]



                                                  22 at           



                                                  0342,           



                                                  Until           



                                                  Discontinu           



                                                  ed,            



                                                  Routine,           



                                                  Nausea and           



                                                  Vomiting           



                                                  (N/V)           

 

     HYDROmorpho      2022- No             .5mg      0.5 mg,           Un

[facility]



     ne                                  Slow IV           ity of



     (DILAUDID)      09:41: 12:04                          Push,           [place]



     injection      36   :38                           Q4HPRN,           Medical



     0.5 mg                                         Starting           [facility]



                                                  on Thu           



                                                  22 at           



                                                  0341,           



                                                  Until Thu           



                                                  22 at           



                                                  0604,           



                                                  Routine,           



                                                  Pain           



                                                  (scale           



                                                  7-10)<br>U           



                                                  se             



                                                  approved           



                                                  by             



                                                  (Faculty):           



                                                  ADC            



                                                  PROVIDER           

 

     proCHLORper            Yes            10mg      10 mg,           Univ

ers



     azine                                     Slow IV           ity of



     (COMPAZINE)      09:07:                               Push,           [place]



     injection      27                                 Q6HPRN,           Medical



     10 mg                                         Starting           [facility]



                                                  on Thu           



                                                  22 at           



                                                  0307,           



                                                  Until           



                                                  Discontinu           



                                                  ed,            



                                                  Routine,           



                                                  Nausea and           



                                                  Vomiting           



                                                  (N/V)           

 

     acetaminoph            Yes            650mg      650 mg,           Un

[facility]



     en                                       Oral,           ity of



     (TYLENOL)      09:07:                               Q6HPRN,           [place]



     tablet 650      10                                 Starting           Medic

al



     mg                                           on Thu           [facility]



                                                  22 at           



                                                  0307,           



                                                  Until           



                                                  Discontinu           



                                                  ed,            



                                                  Routine,           



                                                  Pain           



                                                  (scale           



                                                  1-3)           

 

     meropenem      2022- No             1g        1 g, IV           Univ

ers



     (MERREM)                           Piggyback,           it

y of



     g in NaCl      07:15: 08:49                          ONCE, 1           [place]

[place]



     0.9% (NS)      00   :00                           dose, On           Medica

l



     100 mL                                         Thu            [facility]



     MINI-BAG                                         22 at           



                                                  0115,           



                                                  Administer           



                                                  over 60           



                                                  Minutes,           



                                                  100            



                                                  mL<br>Rest           



                                                  ricted use           



                                                  approved           



                                                  by: [facility]           



                                                  PROVIDER<b           



                                                  r>Reason           



                                                  for            



                                                  Anti-Infec           



                                                  tive:           



                                                  Documented           



                                                  Infection<           



                                                  br>Documen           



                                                  huma            



                                                  Infection           



                                                  Site:           



                                                  Urine<br>D           



                                                  uration of           



                                                  Therapy:           



                                                  Other (see           



                                                  Comments)           

 

     cefdinir      2022- No             300mg      300 mg,           Univ

ers



     (OMNICEF)                                Oral,           ity of



     capsule 300      03:30: 02:55                          ONCE, 1           Te

[facility]



     mg        00   :00                           dose, On           Medical



                                                  Mon            [facility]



                                                  22 at           



                                                  2130,           



                                                  ASAP<br>Re           



                                                  ason for           



                                                  Anti-Infec           



                                                  tive:           



                                                  Documented           



                                                  Infection<           



                                                  br>Documen           



                                                  huma            



                                                  Infection           



                                                  Site:           



                                                  Urine<br>D           



                                                  uration of           



                                                  Therapy: 7           



                                                  days           

 

     FENTanyl PF      2022- No             50ug      50 mcg,           Un

[facility]



     (SUBLIMAZE                                Slow IV           ity o

f



     (PF))      01:15: 03:26                          Push,           [place]



     injection      00   :00                           ONCE, 1           Medical



     50 mcg                                         dose, On           [facility]



                                                  Mon            



                                                  22 at           



                                                  1915, STAT           

 

     proMETHazin      2022- No             25mg      25 mg, IV           

Univers



     e                                   Piggyback,           ity of



     (PHENERGAN)      01:15: 03:26                          ONCE, 1           Te

[facility]



     25 mg in      00   :00                           dose, On           Medical



     NaCl 0.9%                                         Mon            [facility]



     (NS) 50 mL                                         22 at           



     piggyback                                         1915, 50           



                                                  mL             

 

     cefdinir      2022- Yes       [id number] 300mg      Take 1           U

nivers



     300 mg                                capsule by           ity of



     capsule      00:00: 05:59                          mouth           [place]



               00   :00                           every 12           Medical



                                                  (twelve)           [facility]anch



                                                  hours for           



                                                  10 days.           

 

     cefdinir      2022- No        [id number] 300mg      Take 1           U

nivers



     300 mg                                capsule by           ity of



     capsule      00:00: 00:00                          mouth           [place]



               00   :00                           every 12           Medical



                                                  (twelve)           [facility]



                                                  hours for           



                                                  10 days.           

 

     FENTanyl PF      2022- No             50ug      50 mcg,           Un

[facility]



     (SUBLIMAZE                                Slow IV           ity o

f



     (PF))      02:00: 01:19                          Push,           [place]



     injection      00   :00                           ONCE, 1           Medical



     50 mcg                                         dose, On           [facility]



                                                  Sat            



                                                  1/15/22 at           



                                                  2000,           



                                                  Routine           

 

     methocarbam      2022- No             1000mg      1,000 mg,         

  [facility]



     oL                                  Oral,           ity of



     (ROBAXIN)      02:00: 01:19                          ONCE, 1           [place]

[place]



     tablet      00   :00                           dose, On           Medical



     1,000 mg                                         Sat            [facility]



                                                  1/15/22 at           



                                                  2000, ASAP           

 

     proMETHazin      2022- No             12.5mg      12.5 mg,          

 Univers



     e         1-15 01-15                          IV             ity of



     (PHENERGAN)      22:46: 23:00                          Piggyback,          

 [place]



     12.5 mg in      00   :00                           ONCE, 1           Medica

l



     NaCl 0.9%                                         dose, On           Branch



     (NS) 50 mL                                         Sat            



     piggyback                                         1/15/22 at           



                                                  1700, 50           



                                                  mL             

 

     FENTanyl PF      2022- No             50ug      50 mcg,           Un

[facility]



     (SUBLIMAZE      1-15 01-15                          Slow IV           ity o

f



     (PF))      22:45: 23:00                          Push,           [place]



     injection      00   :00                           ONCE, 1           Medical



     50 mcg                                         dose, On           [facility]



                                                  Sat            



                                                  1/15/22 at           



                                                  1645,           



                                                  Routine           

 

     methocarbam      -0      Yes       [id number] 1000mg      Take 2         

  [facility]



     oL 500 mg      1-15                               tablets by           ity 

of



     tablet      00:00:                               mouth 4           [place]



               00                                 (four)           Medical



                                                  times           Branch



                                                  daily as           



                                                  needed for           



                                                  Pain           



                                                  (scale           



                                                  1-3).           

 

     methocarbam      -0      Yes       [id number] 1000mg      Take 2         

  [facility]



     oL 500 mg      1-15                               tablets by           ity 

of



     tablet      00:00:                               mouth 4           [place]



               00                                 (four)           Medical



                                                  times           [facility]



                                                  daily as           



                                                  needed for           



                                                  Pain           



                                                  (scale           



                                                  1-3).           

 

     methocarbam      -0      Yes       [id number] 1000mg      Take 2         

  [facility]



     oL 500 mg      1-15                               tablets by           ity 

of



     tablet      00:00:                               mouth 4           [place]



               00                                 (four)           Medical



                                                  times           [facility]



                                                  daily as           



                                                  needed for           



                                                  Pain           



                                                  (scale           



                                                  1-3).           

 

     methocarbam      -0      Yes       [id number] 1000mg      Take 2         

  [facility]



     oL 500 mg      1-15                               tablets by           [facility] 

of



     tablet      00:00:                               mouth 4           [place]



               00                                 (four)           Medical



                                                  times           [facility]



                                                  daily as           



                                                  needed for           



                                                  Pain           



                                                  (scale           



                                                  1-3).           

 

     methocarbam      -0      Yes       [id number] 1000mg      Take 2         

  [facility]



     oL 500 mg      1-15                               tablets by           [facility] 

of



     tablet      00:00:                               mouth 4           [place]



               00                                 (four)           Medical



                                                  times           [facility]



                                                  daily as           



                                                  needed for           



                                                  Pain           



                                                  (scale           



                                                  1-3).           

 

     methocarbam      -0      Yes       [id number] 1000mg      Take 2         

  [facility]



     oL 500 mg      1-15                               tablets by           ity 

of



     tablet      00:00:                               mouth 4           [place]



               00                                 (four)           Medical



                                                  times           [facility]



                                                  daily as           



                                                  needed for           



                                                  Pain           



                                                  (scale           



                                                  1-3).           

 

     proMETHazin      2021 No             25mg      25 mg, IV           

[facility]



     e         1-                          Piggyback,           [facility] of



     (PHENERGAN)      23:15: 22:20                          ONCE, 1           [facility]

[facility]



     25 mg in      00   :00                           dose, On           Medical



     NaCl 0.9%                                         Wed            [facility]



     (NS) 50 mL                                         21           



     piggyback                                         at 1715,           



                                                  50 mL           

 

     FENTanyl PF      2021 No             75ug      75 mcg,           Un

[facility]



     (SUBLIMAZE                                Slow IV           ity o

f



     (PF))      22:15: 22:20                          Push,           [place]



     injection      00   :00                           ONCE, 1           Medical



     75 mcg                                         dose, On           [facility]



                                                  Wed            



                                                  21           



                                                  at 1615,           



                                                  Routine           

 

     fidaxomicin      2021      Yes       [id number] 200mg      Take 1          

 [facility]



     200 mg      1-24                               tablet by           [facility] of



     tablet      00:00:                               mouth 2           [place]



               00                                 (two)           Medical



                                                  times           [facility]



                                                  daily.           

 

     proMETHazin      2021      Yes       [id number] 25mg      Take 1           

[facility]



     e 25 mg      1-24                               tablet by           [facility] of



     tablet      00:00:                               mouth           [place]



               00                                 every 6           Medical



                                                  (six)           [facility]



                                                  hours as           



                                                  needed for           



                                                  Nausea and           



                                                  Vomiting           



                                                  (N/V).           

 

     fidaxomicin      2021      Yes       [id number] 200mg      Take 1          

 [facility]



     200 mg      1-24                               tablet by           [facility] of



     tablet      00:00:                               mouth 2           [place]



               00                                 (two)           Medical



                                                  times           [facility]



                                                  daily.           

 

     proMETHazin      2021      Yes       [id number] 25mg      Take 1           

[facility]



     e 25 mg      1-24                               tablet by           [facility] of



     tablet      00:00:                               mouth           [place]



               00                                 every 6           Medical



                                                  (six)           [facility]



                                                  hours as           



                                                  needed for           



                                                  Nausea and           



                                                  Vomiting           



                                                  (N/V).           

 

     cholestyram      2021      Yes                                     [facility]

s



     ine 4 gram      1-24                                              [facility] of



     packet      00:00:                                              [place]



               00                                                Medical



                                                                 [facility]

 

     fidaxomicin      2021      Yes       [id number] 200mg      Take 1          

 [facility]



     200 mg      1-24                               tablet by           [facility] of



     tablet      00:00:                               mouth 2           [place]



               00                                 (two)           Medical



                                                  times           [facility]



                                                  daily.           

 

     proMETHazin      2021      Yes       [id number] 25mg      Take 1           

[facility]



     e 25 mg      1-24                               tablet by           [facility] of



     tablet      00:00:                               mouth           [place]



               00                                 every 6           Medical



                                                  (six)           [facility]



                                                  hours as           



                                                  needed for           



                                                  Nausea and           



                                                  Vomiting           



                                                  (N/V).           

 

     cholestyram      2021      Yes                                     [facility]

s



     ine 4 gram      1-24                                              ity of



     packet      00:00:                                              [place]



                                                               [facility]



                                                                 [facility]

 

     cholestyram      2021      Yes                                     [facility]

s



     ine 4 gram      1-24                                              ity of



     packet      00:00:                                              [place]



                                                               [facility]



                                                                 [facility]

 

     cholestyram      2021      Yes                                     Un[facility]

s



     ine 4 gram      1-24                                              ity of



     packet      00:00:                                              [place]



                                                               [facility]



                                                                 [facility]

 

     cholestyram      2021      Yes                                     Un[facility]

s



     ine 4 gram      1-24                                              ity of



     packet      00:00:                                              [place]



                                                               [facility]



                                                                 [facility]

 

     cholestyram      2021      Yes                                     Un[facility]

s



     ine 4 gram      1-24                                              ity of



     packet      00:00:                                              [place]



                                                               Medical



                                                                 [facility]anch

 

     fidaxomicin      2021      Yes       [id number] 200mg      Take 1          

 [facility]



     200 mg      1-24                               tablet by           [facility] of



     tablet      00:00:                               mouth 2           [place]



               00                                 (two)           Medical



                                                  times           [facility]



                                                  daily.           

 

     proMETHazin      2021      Yes       [id number] 25mg      Take 1           

[facility]



     e 25 mg      1-24                               tablet by           [facility] of



     tablet      00:00:                               mouth           [place]



               00                                 every 6           Medical



                                                  (six)           [facility]



                                                  hours as           



                                                  needed for           



                                                  Nausea and           



                                                  Vomiting           



                                                  (N/V).           

 

     fidaxomicin      2021- No        [id number] 200mg      Take 1         

  [facility]



     200 mg      1-24 -25                          tablet by           ity of



     tablet      00:00: 00:00                          mouth 2           [place]



               00   :00                           (two)           Medical



                                                  times           [facility]



                                                  daily.           

 

     proMETHazin      2021- No        [id number] 25mg      Take 1          

 [facility]



     e 25 mg      1-24 01-25                          tablet by           ity of



     tablet      00:00: 00:00                          mouth           [place]



               00   :00                           every 6           Medical



                                                  (six)           [facility]



                                                  hours as           



                                                  needed for           



                                                  Nausea and           



                                                  Vomiting           



                                                  (N/V).           

 

     FENTanyl PF      2021- No             50ug      50 mcg,           Un

[facility]



     (SUBLIMAZE      5-10 05-10                          Intravenou           it

y of



     (PF))      02:45: 01:34                          s, ONCE, 1           [place]



     injection      00   :00                           dose, Sun           Medic

al



     50 mcg                                         21 at           Branch



                                                  2145,           



                                                  Routine           

 

     cefTRIAXone      2021- No             1000mg      1,000 mg,         

  [facility]



     (ROCEPHIN)      5-10 05-10                          IV             ity of



     1,000 mg in      02:30: 01:55                          Piggyback,          

 [place]



     NaCl 0.9%      00   :00                           ONCE, 1           Medical



     (NS) 50 mL                                         dose, [facility]

ch



     MINI-BAG                                         21 at           



                                                  2130, 50           



                                                  mL<br>Reas           



                                                  on for           



                                                  Anti-Infec           



                                                  tive:           



                                                  Documented           



                                                  Infection<           



                                                  br>Documen           



                                                  huma            



                                                  Infection           



                                                  Site:           



                                                  Urine<br>D           



                                                  uration of           



                                                  Therapy: 7           



                                                  days           

 

     famotidine      2021- No             20mg      20 mg,           Univ

ers



     (PEPCID      5-10 05-10                          Slow IV           ity of



     (PF))      01:00: 00:12                          Push,           [place]



     injection      00   :00                           ONCE, 1           Medical



     20 mg                                         dose, [facility]



                                                  21 at           



                                                  2000, ASAP           

 

     proMETHazin      2021- No             25mg      25 mg, IV           

[facility]



     e         5-10 05-10                          Piggyback,           ity of



     (PHENERGAN)      00:45: 00:11                          ONCE, 1           [facility]

[facility]



     25 mg in      00   :00                           dose, [place]           Med[facility]

l



     NaCl 0.9%                                         21 at           [facility]

h



     (NS) 50 mL                                         1945, 50           



     piggyback                                         mL             

 

     FENTanyl PF      2021- No             50ug      50 mcg,           Un

[facility]



     (SUBLIMAZE      5-10 05-10                          Intravenou           it

y of



     (PF))      00:45: 00:12                          s, ONCE, 1           [place]



     injection      00   :00                           dose, Sun           Medic

al



     50 mcg                                         21 at           [place]



                                                  1945,           



                                                  Routine           

 

     NaCl 0.9%      2021- No             1000mL      at 999           Uni

vers



     (NS) bolus      5-10 05-10                          mL/hr,           ity of



     infusion      00:00: 01:47                          1,000 mL,           [facility]

as



     1,000 mL      00   :00                           IV             [facility]



                                                  [facility]



                                                  ONCE, 1           



                                                  dose, [place]           



                                                  21 at           



                                                  1900, ASAP           

 

     cefdinir      2021- No        [id number] 300mg      Take 1           U

nivers



     300 mg       05-20                          capsule by           ity of



     capsule      00:00: 04:59                          mouth 2           [place]



               00   :00                           (two)           Medical



                                                  times           [place]



                                                  daily for           



                                                  10 days.           

 

     buPROPion      2021- No             150mg QD   Take 1           [facility]



     (WELLBUTRIN      1-17 01-16                          tablet           [facility]

 -



     XL) 150 MG      00:00: 23:59                          (150 mg           Med

ical



     24 hr      00   :00                           total) by           Center



     tablet                                         mouth           



                                                  daily.           

 

     citalopram      2021- No             40mg QD   Take 1           [facility]



     (CELEXA) 40      1-17 01-16                          tablet (40           [facility]

[facility] -



     MG tablet      00:00: 23:59                          mg total)           Me

dical



               00   :00                           by mouth           Center



                                                  daily.           

 

     oxyCODONE-a            Yes            1{tbl}      Take 1           [facility]



     cetaminophe      1-16                               tablet by           Ni

es -



     n         14:44:                               mouth           Medical



     (PERCOCET)      50                                 every 4           Center



      mg                                         (four)           



     per tablet                                         hours as           



                                                  needed for           



                                                  Pain.           

 

     zinc            Yes            5g   Q.5D Apply 5 g           [facility]



     oxide-yuan      1-16                               topically           Ni

es -



     latum      00:00:                               2 (two)           Medical



     (CRITIC-AID      00                                 times           Center



     ) 20-51 %                                         daily.           



     Pste                                                        



     topical                                                        



     paste                                                        

 

     meropenem            Yes            1g        Inject 1 g           [facility]



     (MERREM)      1-16                               intravenou           [facility]

 -



     MBP 1 gm in      00:00:                               sly every           M

edical



     100 mL NS      00                                 8 (eight)           [facility]

r



                                                  hours.           

 

     insulin            Yes            58U  Q.5D Inject 58           [facility]

t



     glargine      1-16                               Units           [facility]



     (LANTUS)      00:00:                               subcutaneo           Med

ical



     100 unit/mL      00                                 usly 2           Center



     injection                                         (two)           



                                                  times           



                                                  daily Use           



                                                  as             



                                                  directed.           

 

     insulin      2021- No             0U        Inject           [facility]



     lispro      1-16 01-15                          0-12 Units           [facility] 

-



     (HUMALOG)      00:00: 23:59                          subcutaneo           M

edical



     100 unit/mL      00   :00                           usly 3           Center



     injection                                         (three)           



                                                  times           



                                                  daily           



                                                  before           



                                                  meals.           

 

     insulin      2021- No             25U       Inject 25           [facility]



     lispro      1-16 01-15                          Units           [facility]



     (HUMALOG)      00:00: 23:59                          subcutaneo           M

edical



     100 unit/mL      00   :00                           usly 3           Center



     injection                                         (three)           



                                                  times           



                                                  daily           



                                                  before           



                                                  meals.           

 

     normal      2018      No                       1,000 mL,           [facility]

a



     saline 0.9%      08                               Rate: 100           l



     IV 1,000 mL      11:04:                               ml/hr,           [facility]

jorge



               00                                 Infuse           



                                                  over: 10           



                                                  hr, Route:           



                                                  IV, Dosing           



                                                  Weight           



                                                  131.818           



                                                  kg, Total           



                                                  Volume:           



                                                  1,000,           



                                                  Start           



                                                  date:           



                                                  18           



                                                  5:04:00           



                                                  CST,           



                                                  Duration:           



                                                  30 day,           



                                                  Stop date:           



                                                  18           



                                                  5:03:00           



                                                  CST, 2.4,           



                                                  m2             

 

     Magnesium      2018      No                       Notes:           [facility]

a



     Sulfate                                     WASTE: F/P           l



               10:36:                               - Sink; E           Jose



               00                                 -              



                                                  Municipal           



                                                  Trash Bin           

 

     Isolyte S      2018      No                       Notes:           [facility]

a



     PH-7.4                                     (Same as:           l



     (Bolus) IV      10:36:                               Isolyte S           He

rmann



               00                                 PH 7.4)           

 

     Phenergan      2018      No                       12.5 mg,           [facility]

rajeev



               -08                               0.5 mL,           l



               10:35:                               Route:                                            IVPB, Drug           



                                                  form: INJ,           



                                                  ONCE,           



                                                  Dosing           



                                                  Weight           



                                                  131.818,           



                                                  kg,            



                                                  Priority:           



                                                  STAT,           



                                                  Start           



                                                  date:           



                                                  18           



                                                  4:35:00           



                                                  CST, Stop           



                                                  date:           



                                                  18           



                                                  4:35:00           



                                                  CST            

 

     Citalopram Citalopram       Yes  [place]                1 tablet     

      [facility]



     Hydrobromid Hydrobromid 7-16                                              L

[facility] -



     e    e    00:00:                                              [facility]



               00                                                l



                                                                 [facility]



                                                                 ent



                                                                 [facility]

 

     Seroquel Seroquel       Yes  [place]                1 tablet         

  [facility]



               7-16                                              [facility]



               00:00:                                              [facility]



               00                                                l



                                                                 [facility]



                                                                 ent



                                                                 [facility]

 

     Docusate            Yes                      100 mg = 1           [facility]

[facility]



     Sodium 100      5-08                               cap, PO,           l



     MG Oral      14:56:                               BID, 0           Jose



     Capsule      00                                 Refill(s)           

 

     Zosyn            Yes                      0              [facility]



               5-08                               Refill(s)           l



               14:56:                                              [facility]



               00                                                

 

     celecoxib            Yes                      200 mg = 1           Me

[facility]



     200 mg oral      5-08                               cap, PO,           l



     capsule      14:56:                               BID, 0           [facility]



               00                                 Refill(s)           

 

     ascorbic            Yes                      500 mg = 1           [facility]

[facility]



     acid      5-08                               tab, PO,           l



               14:56:                               BID, 0           [facility]



               00                                 Refill(s)           

 

     acetaminoph      0      Yes                      1,000 mg =           

[facility]



     en 500 mg      5-08                               2 tab, PO,           l



     oral tablet      14:56:                               Q6Hnow, 0           H

ermann



               00                                 Refill(s)           

 

     Oxycodone      0      Yes                      5 mg = 1           [facility]

rajeev



     Hydrochlori      5-08                               tab, PO,           l



     de 5 MG      14:56:                               Q4H, PRN           [facility]iguel

n



     Oral Tablet      00                                 Pain Score           



                                                  4-6, 0           



                                                  Refill(s)           

 

     zinc            Yes                      220 mg = 1           [facility]



     sulfate 220      5-08                               cap, PO,           l



     mg oral      14:56:                               Daily, 0           [facility]

n



     capsule      00                                 Refill(s)           

 

     multivitami            Yes                      1 tab, PO,           

[facility]



     n         5-08                               Daily, 0           l



               14:56:                               Refill(s)           [facility]



               00                                                

 

     methocarbam            Yes                      1,000 mg =           

[facility]



     ol 500 mg      08                               2 tab, PO,           l



     oral tablet      14:56:                               Q8H, 0           [facility]

jorge



               00                                 Refill(s)           

 

     LORazepam            Yes                      0.5 mg = 1           Me

moria



     0.5 mg oral      08                               tab, PO,           l



     tablet      14:56:                               Q8H, PRN           [facility]



               00                                 Anxiety, 0           



                                                  Refill(s)           

 

     Lidocaine            Yes                      3 patch,           [facility]

rajeev



     Hydrochlori      08                               TOP,           l



     de 0.05      14:56:                               Daily,           Jose



     MG/MG      00                                 Remove           



     Transdermal                                         after 12           



     Patch                                         hours, 0           



     [Lidoderm]                                         Refill(s)           

 

     Robaxin            No                       Notes:           [facility]



               5-06                               (Same           l



               21:00:                               as:Robaxin           [facility]



                                                )              

 

     Oxycodone            No                       Notes:           [facility]

a



     Hydrochlori      5-06                               (Same as:           l



     de 5 MG      18:06:                               Roxicodone           [facility]

jorge



     Oral Tablet      00                                 )              

 

     Ativan            No                       Notes:           [facility]



               5-06                               (Same as:           l



               15:18:                               Ativan)           [facility]



               00                                                

 

     Trazodone            No                       Notes:           [facility]

a



     Hydrochlori      5-06                               (Same As:           l



     de 50 MG      02:00:                               Desyrel)           [facility]ilary

nn



     Oral Tablet      00                                                

 

     remove            No                       Notes:           [facility]



     patch      5-06                               Remove           l



               02:00:                               patch 12           Jose



               00                                 hours           



                                                  after           



                                                  applicatio           



                                                  n each           



                                                  day.           

 

     Oxycodone            No                       Notes:           [facility]

a



     Hydrochlori      5-05                               (Same as:           l



     de 5 MG      22:01:                               Roxicodone           [facility]

jorge



     Oral Tablet      00                                 )              

 

     Celebrex            No                       Notes:           [facility]



               5-05                               NSAID.           l



               22:00:                               Please           [facility]



               00                                 check           



                                                  indication           



                                                  . Not for           



                                                  seizure.           



                                                  (Same As:           



                                                  CeleBREX)           

 

     Vancomycin            No                         mg:           Me

moria



               5-05                               infuse           l



               21:00:                               over 2.5           Jose



               00                                 hours           

 

     Lidocaine            No                       Notes:           [facility]

a



     Hydrochlori      5-05                               Apply only           l



     de 0.05      14:00:                               once for           Miguel

n



     MG/MG      00                                 up to 12           



     Transdermal                                         hours in a           



     Patch                                         24-hour           



     [Lidoderm]                                         period (12           



                                                  hours on           



                                                  and 12           



                                                  hours           



                                                  off).           



                                                  (Same as:           



                                                  Lidoderm)           



                                                  "Remove           



                                                  old patch           



                                                  before           



                                                  applicatio           



                                                  n of new           



                                                  patch"           

 

     Phenergan            No                       Notes: Do           [facility]

oria



               5-04                               not give           l



               22:40:                               IV push.           [facility]



               00                                 (Same as:           



                                                  Phenergan)           

 

     Dilaudid            No                       Notes:           [facility]



               5-04                               Same as           l



               22:40:                               Dilaudid           [facility]



               00                                                

 

     Tramadol            No                       Notes: Not           Mem

oria



               5-04                               to exceed           l



               22:00:                               400mg/day.           Jose



               00                                 (Same As:           



                                                  Ultram)           

 

     gabapentin            No                       Notes:           [facility]

ia



               -04                               (Same as:           l



               22:00:                               Neurontin)           [facility]



               00                                                

 

     Acetaminoph            No                       Notes: Max           

[facility]



     en        5-04                               acetaminop           l



               22:00:                               hen 4000           [facility]



               00                                 mg/day (4           



                                                  gm/day).           



                                                  (Same as:           



                                                  Tylenol           



                                                  Extra           



                                                  Strength)           

 

     Robaxin            No                       Notes:           [facility]



               5-04                               (Same           l



               22:00:                               as:Robaxin           [facility]



               00                                 )              

 

     Oxycodone            No                       Notes:           [facility]

a



     Hydrochlori      5-04                               (Same as:           l



     de 5 MG      21:33:                               Roxicodone           [facility]erm

jorge



     Oral Tablet      00                                 )              

 

     Beneprotein            No                       Notes:           [facility]

rajeev



     7 gm pkt      5-04                               (Same as:           l



               21:30:                               Beneprotei           [facility]



               00                                 n)             

 

     Acetaminoph            No                       1 tab, PO,           

[facility]



     en 325 MG /      5-04                               TID, 0           l



     Oxycodone      17:12:                               Refill(s)           Her

child



     Hydrochlori      00                                                



     de 10 MG                                                        



     Oral Tablet                                                        



     [Percocet                                                        



     10/325]                                                        

 

     Dilaudid            No                       0.5 mg,           [facility]

a



               5-04                               0.25 mL,           l



               16:01:                               Route:           [facility]



               00                                 IVP, Drug           



                                                  form: INJ,           



                                                  ONCE,           



                                                  Start           



                                                  date:           



                                                  18           



                                                  11:01:00           



                                                  CDT, Stop           



                                                  date:           



                                                  18           



                                                  11:01:00           



                                                  CDT            

 

     Phenergan            No                       Notes:           [facility]

a



               -                               (Same as:           l



               15:43:                               Phenergan)                                                           

 

     Dilaudid            No                       2 mg,           [facility]



               5-04                               Route:           l



               15:43:                               IVP, ONCE,                                            Dosing           



                                                  Weight           



                                                  127.027,           



                                                  kg,            



                                                  Priority:           



                                                  STAT,           



                                                  Start           



                                                  date:           



                                                  18           



                                                  10:43:00           



                                                  CDT, Stop           



                                                  date:           



                                                  18           



                                                  10:43:00           



                                                  CDT            

 

     Docusate            No                       Notes:           [facility]



               5-                               (Same as:           l



               14:00:                               Colace)                                            (Do Not           



                                                  Crush)           

 

     Zinc            No                       Notes:           [facility]



     Sulfate                                     (Zinc           l



               14:00:                               sulfate                                            capsule) -           



                                                  220 mg           



                                                  Zinc           



                                                  sulfate =           



                                                  50 mg           



                                                  elemental           



                                                  zinc  Same           



                                                  as Zinc           



                                                  Sulfate           

 

     ascorbic            No                       Notes:           [facility]



     acid      -                               (Same as:           l



               14:00:                               Vitamin C)                                                           

 

     multivitami            No                       Notes:           [facility]

rajeev



     n                                        (Same           l



               14:00:                               as:Thera)                                            WASTE: F/P           



                                                  - Black; E           



                                                  -              



                                                  Municipal           



                                                  Trash Bin           



                                                  Take with           



                                                  food.           

 

     Naproxen            No                       Notes:           [facility]



               -                               (Same as:           l



               07:00:                               Naprosyn)                                            Take with           



                                                  food.           

 

     Zosyn            No                       Notes:           [facility]



               5-                               (Same as:           l



               07:00:                               Zosyn)                                            Dosing           



                                                  based on           



                                                  Piperacill           



                                                  in             



                                                  component           



                                                   ***           



                                                  MEDICATION           



                                                  WASTE ***           



                                                  Product           



                                                  Size:           



                                                  3375 mg           



                                                  Product           



                                                  Wasted:           



                                                  ___ mg           

 

     Vancomycin            No                         mg:           Me

moria



               -                               infuse           l



               07:00:                               over 2.5           [facility]                                 hours  For           



                                                  adult           



                                                  patients           



                                                  only:           



                                                  Round to           



                                                  nearest           



                                                  250 mg per           



                                                  Medical           



                                                  Staff           



                                                  approval           



                                                  ***            



                                                  MEDICATION           



                                                  WASTE ***           



                                                  Product           



                                                  Size:           



                                                  1000 mg           



                                                  Product           



                                                  Wasted:           



                                                  ___ mg           

 

     Enoxaparin            No                       Notes:           [facility]

ia



               -                               (Same as:           l



               07:00:                               Lovenox)                                                           

 

     Sodium            No                       1,000 mL,           [facility]

a



     Chloride                                     Rate: 125           l



     0.9% IV      06:46:                               ml/hr,           Jose



     1,000 mL      00                                 Infuse           



                                                  over: 8           



                                                  hr, Route:           



                                                  IV, Dosing           



                                                  Weight           



                                                  127.27 kg,           



                                                  Total           



                                                  Volume:           



                                                  1,000,           



                                                  Start           



                                                  date:           



                                                  18           



                                                  1:46:00           



                                                  CDT,           



                                                  Duration:           



                                                  30 day,           



                                                  Stop date:           



                                                  18           



                                                  1:45:00           



                                                  CDT, 2.44,           



                                                  m2             

 

     Saline            No                       Notes:           [facility]



     Flush 0.9%      -                               (Same as:           l



               06:46:                               BD             [place]



               00                                 Posiflush)           

 

     Acetaminoph            No                       Notes: Do           [facility]

[facility]



     en        5-04                               not exceed           l



               06:46:                               4 gm/day.           [facility]



               00                                 (Same as:           



                                                  Tylenol)           

 

     Acetaminoph            No                       Notes:           [facility]

[facility]



     en 325 MG /      5-                               (Same as:           l



     Hydrocodone      06:46:                               Norco           [facility]

nn



     Bitartrate      00                                 325/5)  Do           



     5 MG Oral                                         not exceed           



     Tablet                                         4gm/day of           



                                                  acetaminop           



                                                  hen.           

 

     Reglan            No                       Notes:           [facility]



               5-04                               (Same as:           l



               04:27:                               Reglan)           [place]



               00                                                

 

     Benadryl            No                       Notes:           [facility]



               5-04                               (Same as:           l



               04:27:                               Benadryl)           [place]



               00                                                

 

     Magnesium            No                       Notes:           [facility]

a



     Sulfate                                     WASTE: F/P           l



               04:26:                               - Sink; E           [place]



               00                                 -              



                                                  Municipal           



                                                  Trash Bin           

 

     Sodium            No                       1,000 mL,           [facility]

a



     Chloride      -04                               1000           l



     0.9%      04:26:                               ml/hr,           [facility]



     (Bolus) IV      00                                 Infuse           



                                                  Over: 1           



                                                  hr, Route:           



                                                  IV, 1,000,           



                                                  Drug form:           



                                                  INJ, ONCE,           



                                                  Priority:           



                                                  STAT,           



                                                  Dosing           



                                                  Weight           



                                                  127.273           



                                                  kg, Start           



                                                  date:           



                                                  18           



                                                  23:26:00           



                                                  CDT, Stop           



                                                  date:           



                                                  18           



                                                  23:26:00           



                                                  CDT            

 

     Zosyn      2018-      No                       4.5 gm,           [facility]



               5-04                               Route:           l



               04:10:                               IVPB,           [facility]



               00                                 ONCE,           



                                                  Dosing           



                                                  Weight           



                                                  127.273,           



                                                  kg,            



                                                  Priority:           



                                                  STAT,           



                                                  Start           



                                                  date:           



                                                  18           



                                                  23:10:00           



                                                  CDT, Stop           



                                                  date:           



                                                  18           



                                                  23:10:00           



                                                  CDT, ABX           



                                                  Indication           



                                                  :              



                                                  Bacteremia           

 

     Vancomycin            No                        2001 mg:           [facility]

[facility]



               5-04                               infuse           l



               04:10:                               over 2.5           [facility]                                 hours  For           



                                                  adult           



                                                  patients           



                                                  only:           



                                                  Round to           



                                                  nearest           



                                                  250 mg per           



                                                  Medical           



                                                  Staff           



                                                  approval           



                                                  ***            



                                                  MEDICATION           



                                                  WASTE ***           



                                                  Product           



                                                  Size:           



                                                  1000 mg           



                                                  Product           



                                                  Wasted:           



                                                  ___ mg           

 

     normal            No                       1,000 mL,           [facility]

a



     saline 0.9%                                     Rate: 75           l



     IV 1,000 mL      03:39:                               ml/hr,                                            Infuse           



                                                  over: 13.3           



                                                  hr, Route:           



                                                  IV, Dosing           



                                                  Weight           



                                                  127.273           



                                                  kg, Total           



                                                  Volume:           



                                                  1,000,           



                                                  Start           



                                                  date:           



                                                  18           



                                                  22:39:00           



                                                  CDT,           



                                                  Duration:           



                                                  30 day,           



                                                  Stop date:           



                                                  18           



                                                  22:38:00           



                                                  CDT, 2.36,           



                                                  m2             

 

     Acetaminoph            No                       Notes: Max           

[facility]



     en                                       acetaminop           l



               02:56:                               hen 4000           [facility]                                 mg/day (4           



                                                  gm/day).           



                                                  (Same as:           



                                                  Tylenol           



                                                  Extra           



                                                  Strength)           

 

     Rocephin            No                       Notes:           [facility]



                                              (Same As:           l



               02:21:                               Rocephin).                                              ***           



                                                  MEDICATION           



                                                  WASTE ***           



                                                  Product           



                                                  Size:           



                                                  1000 mg           



                                                  Product           



                                                  Wasted:           



                                                  _0__ mg           

 

     Morphine            No                       4 mg,           [facility]



                                              Route:           l



               00:05:                               IVP, ONCE,                                            Dosing           



                                                  Weight           



                                                  127.273,           



                                                  kg,            



                                                  Priority:           



                                                  STAT,           



                                                  Start           



                                                  date:           



                                                  18           



                                                  19:05:00           



                                                  CDT, Stop           



                                                  date:           



                                                  18           



                                                  19:05:00           



                                                  CDT            

 

     Benadryl            No                       Notes:           [facility]



               -                               (Same as:           l



               23:14:                               Benadryl)                                                           

 

     Benadryl            No                       Notes:           [facility]



               5-03                               (Same as:           l



               22:56:                               Benadryl)                                                           

 

     Morphine            No                       4 mg, 1           [facility]

a



               5-03                               mL, Route:           l



               22:56:                               IVP, Drug                                            form:           



                                                  SOLN,           



                                                  ONCE,           



                                                  Dosing           



                                                  Weight           



                                                  127.273,           



                                                  kg,            



                                                  Priority:           



                                                  STAT,           



                                                  Start           



                                                  date:           



                                                  18           



                                                  17:56:00           



                                                  CDT, Stop           



                                                  date:           



                                                  18           



                                                  17:56:00           



                                                  CDT            

 

     OXYCODONE      -      Yes                      Take  by           [facility]

ers



     HCL/ACETAMI      2-20                               mouth.           [facility] of



     NOPHEN      10:03:                                              [place]



     (PERCOCET      17                                                Medical



     [facility])                                                        Branch

 

     OXYCODONE      2017      Yes                      Take  by           Univ

ers



     HCL/ACETAMI      2-20                               mouth.           ity of



     NOPHEN      04:03:                                              [place]



     (PERCOCET      17                                                Medical



     [facility])                                                        Branch

 

     OXYCODONE      2017      Yes                      Take  by           Univ

ers



     HCL/ACETAMI      2-20                               mouth.           ity of



     NOPHEN      04:03:                                              [place]



     (PERCOCET      17                                                Medical



     [facility])                                                        Branch

 

     OXYCODONE      2017      Yes                      Take  by           Univ

ers



     HCL/ACETAMI      2-20                               mouth.           ity of



     NOPHEN      04:03:                                              [place]



     (PERCOCET      17                                                Medical



     [facility])                                                        [place]

 

     OXYCODONE      2017      Yes                      Take  by           Univ

ers



     HCL/ACETAMI      2-20                               mouth.           ity of



     NOPHEN      04:03:                                              [place]



     (PERCOCET      17                                                Medical



     [facility])                                                        Branch

 

     dicyclomine      2017      Yes            20mg      Take 1           [facility]

ers



     (BENTYL) 20      2-20                               tablet by           ity

 of



     mg tablet      00:00:                               mouth 4           [place]



               00                                 (four)           Medical



                                                  times           Branch



                                                  daily.           

 

     proMETHazin      2017      Yes            25mg      Take 1           [facility]

ers



     e 25 mg      2-20                               tablet by           ity of



     tablet      00:00:                               mouth           [place]



               00                                 every 6           Medical



                                                  (six)           Branch



                                                  hours as           



                                                  needed for           



                                                  Nausea and           



                                                  Vomiting           



                                                  (N/V).           

 

     proMETHazin      2017      Yes            25mg      Take 1           [facility]

ers



     e 25 mg      2-20                               tablet by           ity of



     tablet      00:00:                               mouth           [place]



               00                                 every 6           Medical



                                                  (six)           Branch



                                                  hours as           



                                                  needed for           



                                                  Nausea and           



                                                  Vomiting           



                                                  (N/V).           

 

     proMETHazin      2017      Yes            25mg      Take 1           [facility]

ers



     e 25 mg      2-20                               tablet by           ity of



     tablet      00:00:                               mouth           [place]



               00                                 every 6           Medical



                                                  (six)           Branch



                                                  hours as           



                                                  needed for           



                                                  Nausea and           



                                                  Vomiting           



                                                  (N/V).           

 

     dicyclomine      2017      Yes            20mg      Take 1           [facility]

ers



     (BENTYL) 20      2-20                               tablet by           ity

 of



     mg tablet      00:00:                               mouth 4           [place]



               00                                 (four)           Medical



                                                  times           Branch



                                                  daily.           

 

     proMETHazin      2017      Yes            25mg      Take 1           [facility]

ers



     e 25 mg      2-20                               tablet by           ity of



     tablet      00:00:                               mouth           [place]



               00                                 every 6           Medical



                                                  (six)           Branch



                                                  hours as           



                                                  needed for           



                                                  Nausea and           



                                                  Vomiting           



                                                  (N/V).           

 

     dicyclomine      -      Yes            20mg      Take 1           [facility]

ers



     (BENTYL) 20      2-20                               tablet by           ity

 of



     mg tablet      00:00:                               mouth 4           [place]



               00                                 (four)           Medical



                                                  times           Branch



                                                  daily.           

 

     proMETHazin      2017      Yes            25mg      Take 1           [facility]

ers



     e 25 mg      2-20                               tablet by           ity of



     tablet      00:00:                               mouth           [place]



               00                                 every 6           Medical



                                                  (six)           Branch



                                                  hours as           



                                                  needed for           



                                                  Nausea and           



                                                  Vomiting           



                                                  (N/V).           

 

     proMETHazin      2017- No             25mg      Take 1           [facility]

[facility]



     e 25 mg      2-20 01-25                          tablet by           ity of



     tablet      00:00: 00:00                          mouth           [place]



               00   :00                           every 6           Medical



                                                  (six)           Branch



                                                  hours as           



                                                  needed for           



                                                  Nausea and           



                                                  Vomiting           



                                                  (N/V).           

 

     dicyclomine      2017 No             20mg      Take 1           [facility]

[facility]



     (BENTYL) 20      2-20 01-15                          tablet by           it

y of



     mg tablet      00:00: 00:00                          mouth 4           [place]

[facility]



               00   :00                           (four)           Medical



                                                  times           Branch



                                                  daily.           

 

     proMETHazin      -      Yes            25mg      Take 1           [facility]

ers



     e 25 mg      1-04                               tablet by           ity of



     tablet      00:00:                               mouth           [place]



               00                                 every 6           Medical



                                                  (six)           Branch



                                                  hours as           



                                                  needed for           



                                                  Nausea and           



                                                  Vomiting           



                                                  (N/V) for           



                                                  up to 12           



                                                  doses.           

 

     proMETHazin      -0      Yes            25mg      Take 1           [facility]

ers



     e 25 mg      1-04                               tablet by           ity of



     tablet      00:00:                               mouth           [place]



               00                                 every 6           Medical



                                                  (six)           Branch



                                                  hours as           



                                                  needed for           



                                                  Nausea and           



                                                  Vomiting           



                                                  (N/V) for           



                                                  up to 12           



                                                  doses.           

 

     proMETHazin      2017-0      Yes            25mg      Take 1           Univ

ers



     e 25 mg      1-04                               tablet by           ity of



     tablet      00:00:                               mouth           [place]



               00                                 every 6           Medical



                                                  (six)           Branch



                                                  hours as           



                                                  needed for           



                                                  Nausea and           



                                                  Vomiting           



                                                  (N/V) for           



                                                  up to 12           



                                                  doses.           

 

     proMETHazin      2017-0      Yes            25mg      Take 1           Un[facility]

ers



     e 25 mg      1-04                               tablet by           ity of



     tablet      00:00:                               mouth           [place]



               00                                 every 6           Medical



                                                  (six)           Branch



                                                  hours as           



                                                  needed for           



                                                  Nausea and           



                                                  Vomiting           



                                                  (N/V) for           



                                                  up to 12           



                                                  doses.           

 

     proMETHazin      2017-0      Yes            25mg      Take 1           Univ

ers



     e 25 mg      1-04                               tablet by           ity of



     tablet      00:00:                               mouth           [place]



               00                                 every 6           Medical



                                                  (six)           Branch



                                                  hours as           



                                                  needed for           



                                                  Nausea and           



                                                  Vomiting           



                                                  (N/V) for           



                                                  up to 12           



                                                  doses.           

 

     proMETHazin      2017-0      Yes            25mg      Take 1           Un[facility]

ers



     e 25 mg      1-04                               tablet by           ity of



     tablet      00:00:                               mouth           [place]



               00                                 every 6           Medical



                                                  (six)           Branch



                                                  hours as           



                                                  needed for           



                                                  Nausea and           



                                                  Vomiting           



                                                  (N/V) for           



                                                  up to 12           



                                                  doses.           

 

     proMETHazin      2017-0      Yes            25mg      Take 1           Un[facility]

ers



     e 25 mg      1-04                               tablet by           ity of



     tablet      00:00:                               mouth           [place]



               00                                 every 6           Medical



                                                  (six)           Branch



                                                  hours as           



                                                  needed for           



                                                  Nausea and           



                                                  Vomiting           



                                                  (N/V) for           



                                                  up to 12           



                                                  doses.           

 

     proMETHazin      2017-0      Yes            25mg      Take 1           Univ

ers



     e 25 mg      1-04                               tablet by           ity of



     tablet      00:00:                               mouth           [place]



               00                                 every 6           Medical



                                                  (six)           Branch



                                                  hours as           



                                                  needed for           



                                                  Nausea and           



                                                  Vomiting           



                                                  (N/V) for           



                                                  up to 12           



                                                  doses.           

 

     proMETHazin      2017-0      Yes            25mg      Take 1           Univ

ers



     e 25 mg      1-04                               tablet by           ity of



     tablet      00:00:                               mouth           [place]



               00                                 every 6           Medical



                                                  (six)           Branch



                                                  hours as           



                                                  needed for           



                                                  Nausea and           



                                                  Vomiting           



                                                  (N/V) for           



                                                  up to 12           



                                                  doses.           

 

     Tylenol Tylenol           Yes  [facility] [place]                1 tablet           [facility]



     with with                                    as needed           [facility]



     Codeine #4 Codeine #4                                                   [facility]

[facility]



                                                                 l



                                                                 [facility]



                                                                 ent



                                                                 Clinics

 

     Macrobid Macrobid           Yes  [facility] [place]                1 capsule          

 [facility]



                                                  with food           [facility]



                                                                 [facility]



                                                                 l



                                                                 [facility]



                                                                 ent



                                                                 Clinics

 

     Pantoprazol Pantoprazol           Yes  [facility] [place]                1 tablet     

      [facility]



     e Sodium e Sodium                                                   [facility]



                                                                 [facility]



                                                                 l



                                                                 [facility]



                                                                 ent



                                                                 Clinics

 

     Collagenase Collagenase           Yes  [facility] [place]                1            

  [facility]



                                                  applicatio           [facility]



                                                  n to           [facility]



                                                  affected           l



                                                  area           [facility]



                                                                 ent



                                                                 Clinics







Vital Signs







             Vital Name   Observation Time Observation Value Comments     Source

 

             Systolic blood 2022 06:00:00 144 mm[Hg]                [facility]

[facility] of



             [facility]

 

             Diastolic blood 2022 06:00:00 93 mm[Hg]                 [facility]

[facility] [place]

 

             Heart rate   2022 06:00:00 92 /min                   [facility]

 

             Respiratory rate 2022 06:00:00 22 /min                   [facility]

 

             Oxygen saturation in 2022 04:00:00 94 /min                   

[facility]



             Arterial blood by                                        [place] [facility]

sarah



             Pulse oximetry                                        Branch

 

             Body temperature 2022 03:45:00 37.06 Tiffanie                 [facility]

 

             Body height  2022 03:45:00 149.9 cm                  [facility]

ty of



                                                                 [place] [facility]



                                                                 Branch

 

             Body weight  2022 03:45:00 127.461 kg                [facility]

ty of



                                                                 [place] [facility]



                                                                 Branch

 

             BMI          2022 03:45:00 56.76 kg/m2               [facility]

ty of



                                                                 [facility]



                                                                 Branch

 

             Systolic blood 2022 03:18:00 113 mm[Hg]                [facility]

sity of



             pressure                                            [facility]



                                                                 Branch

 

             Diastolic blood 2022 03:18:00 85 mm[Hg]                 [facility]

rsity of



             pressure                                            [facility]



                                                                 Branch

 

             Heart rate   2022 03:18:00 96 /min                   [facility]

ty of



                                                                 [facility]



                                                                 Branch

 

             Respiratory rate 2022 03:18:00 18 /min                   [facility]iv

ersity of



                                                                 [facility]



                                                                 Branch

 

             Oxygen saturation in 2022 03:18:00 93 /min                   

[facility]



             Arterial blood by                                        [place] [facility]

sarah



             Pulse oximetry                                        Branch

 

             Body temperature 2022 01:01:16 36.89 Tiffanie                 [facility]iv

ersity of



                                                                 [facility]



                                                                 Branch

 

             Body weight  2022 23:13:00 127.461 kg                [facility]

ty of



                                                                 [facility]



                                                                 Branch

 

             BMI          2022 23:13:00 56.76 kg/m2               [facility]

ty of



                                                                 [facility]



                                                                 Branch

 

             Systolic blood 2022 21:53:00 142 mm[Hg]                [facility]

sity of



             pressure                                            [facility]



                                                                 Branch

 

             Diastolic blood 2022 21:53:00 88 mm[Hg]                 Unive

rsity of



             pressure                                            [facility]



                                                                 Branch

 

             Heart rate   2022 21:53:00 96 /min                   [facility]

ty of



                                                                 [place] [facility]



                                                                 Branch

 

             Body temperature 2022 21:53:00 36.06 Tiffanie                 Univ

ersity of



                                                                 [place] [facility]



                                                                 Branch

 

             Respiratory rate 2022 21:53:00 18 /min                   Univ

ersity of



                                                                 [place] [facility]



                                                                 Branch

 

             Oxygen saturation in 2022 21:53:00 96 /min                   

[facility]



             Arterial blood by                                        [place] [facility]

sarah



             Pulse oximetry                                        Branch

 

             Body weight  2022 09:48:00 138.801 kg                [facility]

ty of



                                                                 [place] [facility]



                                                                 Branch

 

             BMI          2022 09:48:00 61.80 kg/m2               [facility]

ty of



                                                                 [facility]



                                                                 Branch

 

             Body height  2022 10:27:00 149.9 cm                  [facility]

ty of



                                                                 [place] [facility]



                                                                 Branch

 

             Systolic blood 2022 03:50:00 142 mm[Hg]                [facility]

sity of



             pressure                                            [facility]



                                                                 Branch

 

             Diastolic blood 2022 03:50:00 95 mm[Hg]                 Unive

rsity of



             pressure                                            [facility]



                                                                 Branch

 

             Heart rate   2022 03:50:00 93 /min                   [facility]

ty of



                                                                 [place] [facility]



                                                                 Branch

 

             Respiratory rate 2022 03:50:00 17 /min                   Univ

ersity of



                                                                 [place] [facility]



                                                                 Branch

 

             Oxygen saturation in 2022 03:50:00 98 /min                   

[facility]



             Arterial blood by                                        [place] [facility]

sarah



             Pulse oximetry                                        Branch

 

             Body temperature 2022 20:39:00 36.5 Tiffanie                  Univ

ersity of



                                                                 [place] [facility]



                                                                 Branch

 

             Body weight  2022 20:39:00 136.079 kg                [facility]

ty of



                                                                 [place] [facility]



                                                                 Branch

 

             BMI          2022 20:39:00 60.59 kg/m2               [facility]

ty of



                                                                 [place] [facility]



                                                                 Branch

 

             Systolic blood 2022 01:46:00 134 mm[Hg]                Un[facility]

sity of



             pressure                                            [facility]



                                                                 Branch

 

             Diastolic blood 2022 01:46:00 100 mm[Hg]                Unive

rsity of



             pressure                                            [facility]



                                                                 Branch

 

             Heart rate   2022 01:46:00 102 /min                  [facility]

ty of



                                                                 [place] [facility]



                                                                 Branch

 

             Respiratory rate 2022 01:46:00 22 /min                   Univ

ersity of



                                                                 [place] [facility]



                                                                 Branch

 

             Oxygen saturation in 2022 01:46:00 96 /min                   

[facility]



             Arterial blood by                                        [place] [facility]

sarah



             Pulse oximetry                                        Branch

 

             Body temperature 2022-01-15 20:52:00 36.67 Tiffanie                 Univ

ersity of



                                                                 [facility]



                                                                 Branch

 

             Body weight  2022-01-15 20:52:00 136.079 kg                [facility]

ty of



                                                                 [facility]



                                                                 Branch

 

             BMI          2022-01-15 20:52:00 60.59 kg/m2               [facility]

ty of



                                                                 [facility]



                                                                 Branch

 

             Systolic blood 2021 23:30:00 175 mm[Hg]                Un[facility]

sity of



             pressure                                            [facility]



                                                                 Branch

 

             Diastolic blood 2021 23:30:00 107 mm[Hg]                Unive

rsity of



             pressure                                            [facility]



                                                                 Branch

 

             Heart rate   2021 23:30:00 81 /min                   [facility]

ty of



                                                                 [facility]



                                                                 Branch

 

             Respiratory rate 2021 23:30:00 21 /min                   Univ

ersity of



                                                                 [place] [facility]



                                                                 Branch

 

             Oxygen saturation in 2021 23:30:00 97 /min                   

[facility]



             Arterial blood by                                        [place] [facility]

sarah



             Pulse oximetry                                        Branch

 

             Body weight  2021 21:55:00 136.079 kg                [facility]

ty of



                                                                 [facility]



                                                                 Branch

 

             BMI          2021 21:55:00 60.59 kg/m2               [facility]

ty of



                                                                 [facility]



                                                                 Branch

 

             Body temperature 2021 21:55:00 37.44 Tiffanie                 Univ

ersity of



                                                                 [facility]



                                                                 Branch

 

             Systolic blood 2021-05-10 01:30:00 163 mm[Hg]                [facility]

sity of



             pressure                                            [facility]



                                                                 Branch

 

             Diastolic blood 2021-05-10 01:30:00 106 mm[Hg]                Unive

rsity of



             pressure                                            [facility]



                                                                 Branch

 

             Heart rate   2021-05-10 01:30:00 97 /min                   [facility]

ty of



                                                                 [facility]



                                                                 Branch

 

             Respiratory rate 2021-05-10 01:30:00 21 /min                   Univ

ersity of



                                                                 [facility]



                                                                 Branch

 

             Oxygen saturation in 2021-05-10 01:30:00 97 /min                   

[facility]



             Arterial blood by                                        [place] [facility]

sarah



             Pulse oximetry                                        Branch

 

             Body temperature 2021 23:27:00 36.83 Tiffanie                 Univ

ersity of



                                                                 [facility]



                                                                 Branch

 

             Body height  2021 23:27:00 149.9 cm                  [facility]

ty of



                                                                 [facility]



                                                                 Branch

 

             Body weight  2021 23:27:00 136.079 kg                [facility]

ty of



                                                                 [facility]



                                                                 Branch

 

             BMI          2021 23:27:00 60.59 kg/m2               [facility]

ty of



                                                                 [facility]



                                                                 Branch

 

             Systolic blood 2021-05-10 01:30:00 163 mm[Hg]                Un[facility]

sity of



             pressure                                            [facility]



                                                                 Branch

 

             Diastolic blood 2021-05-10 01:30:00 106 mm[Hg]                Unive

rsity of



             pressure                                            [facility]



                                                                 Branch

 

             Heart rate   2021-05-10 01:30:00 97 /min                   [facility]

ty of



                                                                 [place] [facility]



                                                                 Branch

 

             Respiratory rate 2021-05-10 01:30:00 21 /min                   Univ

ersity of



                                                                 [facility]



                                                                 Branch

 

             Oxygen saturation in 2021-05-10 01:30:00 97 /min                   

[facility]



             Arterial blood by                                        [place] [facility]

sarah



             Pulse oximetry                                        Branch

 

             Body temperature 2021 23:27:00 36.83 Tiffanie                 Univ

ersity of



                                                                 [facility]



                                                                 Branch

 

             Body height  2021 23:27:00 149.9 cm                  [facility]

 

             Body weight  2021 23:27:00 136.079 kg                [facility]

 

             BMI          2021 23:27:00 60.59 kg/m2               [facility]

 

             Respitory Rate 2018 17:30:00                           [facility]

[facility] [place]

 

             Systolic (mm Hg) 2018 17:30:00                           [facility]

rial [place]

 

             Diastolic (mm Hg) 2018 17:30:00                           [facility]

[facility]

 

             Temperature Oral (F) 2018 17:30:00 98.4 F                    

[facility]

 

             Temperature Oral (F) 2018 16:23:00 98.6 F                    

[facility]

 

             Systolic (mm Hg) 2018 16:23:00                           [facility]

[facility] [place]

 

             Diastolic (mm Hg) 2018 16:23:00                           [facility]

[facility]

 

             Respitory Rate 2018 14:00:00                           Mem[facility]

al [facility]

 

             Systolic (mm Hg) 2018 14:00:00                           Chace

rial Jose

 

             Diastolic (mm Hg) 2018 14:00:00                           Mem

[facility]

 

             Respitory Rate 2018 13:30:00                           Mem[facility]

al [facility]

 

             BMI Calculated 2018 10:16:00                           [facility]

al [facility]

 

             Height       2018 10:16:00 149.86 cm                 [facility]

 [facility]

 

             Weight       2018 10:16:00                           [facility]

 [facility]

 

             Heart Rate   2018 10:16:00                           [facility]

 [place]

 

             Temperature Oral (F) 2018 10:16:00 97.9 F                    

[facility]

 

             Temperature Oral (F) 2018 13:40:00 97.2 F                    

[facility]

 

             Systolic (mm Hg) 2018 13:40:00                           [facility]

rial [facility]

 

             Diastolic (mm Hg) 2018 13:40:00                           Mem

[facility]

 

             Heart Rate   2018 13:40:00                           [facility]

 [facility]

 

             Respitory Rate 2018 13:40:00                           [facility]

[facility] [place]

 

             Heart Rate   2018 05:24:00                           [facility]

 [place]

 

             Systolic (mm Hg) 2018 05:24:00                           Chace

rial [facility]

 

             Diastolic (mm Hg) 2018 05:24:00                           Mem

[facility]

 

             Respitory Rate 2018 05:24:00                           [facility]

[facility] [place]

 

             Temperature Oral (F) 2018 05:24:00 98.1 F                    

[facility]

 

             Respitory Rate 2018 01:15:00                           Mem[facility]

[facility] [place]

 

             Systolic (mm Hg) 2018 01:15:00                           Chace

rial [facility]

 

             Diastolic (mm Hg) 2018 01:15:00                           Mem

[facility] [place]

 

             Heart Rate   2018 01:15:00                           [facility]

 [place]

 

             Temperature Oral (F) 2018 01:15:00 98.1 F                    

[facility]

 

             Weight       2018 14:00:00                           [facility]

 [facility]

 

             Weight       2018 14:00:00                           [facility]

 [facility]

 

             Weight       2018 14:00:00                           [facility]

 [facility]

 

             BMI Calculated 2018 05:43:00                           Memori

al [facility]

 

             Height       2018 05:43:00 162.56 cm                 [facility]

 [place]

 

             Height       2018 22:41:00 149.86 cm                 [facility]

 [facility]

 

             BMI Calculated 2018 22:41:00                           Memori

al [facility]







Procedures







                Procedure       Date / Time     Performing Clinician Source



                                Performed                       

 

                MAGNESIUM       2022 04:12:00 Singer, [facility]

 

                COMP. METABOLIC PANEL 2022 04:12:00 Chris Mcpherson [facility]

[facility] [place]



                ([id number]

 

                CBC WITH DIFF   2022 04:12:00 Chris Mcpherson [facility]

 

                CT HEAD WO CONTRAST 2022 00:15:29 ALLISON Mckeon [facility]

 

                URINALYSIS      2022 23:53:00 ALLISON Mckeon [facility]

 

                COMP. METABOLIC PANEL 2022 23:52:00 ALLISON Mckeon Elena [facility]

[facility] [place]



                ([id number])                                         [facility]

 

                CBC WITH DIFF   2022 23:52:00 ALLISON Mckeon [facility]

 

                XR CHEST 1 VW   2022 03:03:32 Mirta Tate  [facility]

 

                COMP. METABOLIC PANEL 2022 11:57:00 Cielo Thomas   [facility]



                ([id number])                                         Medical Branch

 

                CBC WITH DIFF   2022 11:57:00 [facility] Hospital[facility]iel[facility]   [facility]

 

                BASIC METABOLIC PANEL (NA, [facility]22 [facility]



                K, CL, CO2, GLUCOSE, BUN,                                 [facility]

l [place]



                CREATININE, CA)                                 

 

                CBC WITH DIFF   2022 12[facility]09[facility]00 [facility]

 

                URINALYSIS      2022 00:40:00 Suly Luna [facility]

 

                URINE CULTURE   2022 00:40:00 Suly Luna [facility]

 

                FECES CULTURE   2022 [facility]:58[facility]00 [facility]

 

                OCCULT [facility]GUAIAC[facility]LOOD [facility]

 

                CLOSTRIDIUM DIFFICILE 2022 [facility]:58:00 [facility]

 

                FECAL PATHOGENS BY PCR 2022 14:58:00 [facility]

 

                URINE DRUG (IMMUNOASSAY) - 2022 10:11:00 Leo [facility]



                COMPREHENSIVE DRUG SCREEN                                 [facility]

l [place]

 

                COVID-19 (ID NOW RAPID 2022 07:33:00 Silvino Garay [facility]



                TESTING)                                        Medical Branch

 

                LAB ONLY COVID  2022 07:33:00 Silvino Garay [facility]



                INTERPRETATION                                  [facility]

 

                COMP. METABOLIC PANEL 2022 06:18:00 Silvino Garay [facility]



                ([id number])                                         Medical [place]

 

                CBC WITH DIFF   2022 05:40:00 Silvino Garay [facility]

 

                URINALYSIS      2022 01:43:00 Alma Villaseñor [facility]

 

                LIPASE          2022 01:40:00 Alma Villaseñor [facility]

 

                COMP. METABOLIC PANEL 2022 01:40:00 Alma Villaseñor [facility]



                ([id number])                                         [facility]

 

                CBC WITH DIFF   2022 01:38:00 Alma Villaseñor [facility]

 

                XR CHEST 1 VW   2022 23:40:19 Alma Villaseñor [facility]

 

                ASSIGNMENT OF BENEFITS 2022 22:05:40 Doctor Unassigned, [facility]



                                                [place]         Medical [place]

 

                NOTICE OF PRIVACY 2022 22:04:58 Doctor Unassigned, [facility]



                PRACTICES                       [place]         [facility]

 

                CONSENT/REFUSAL FOR 2022 22:04:33 Doctor Derrell, [facility]



                DIAGNOSIS AND TREATMENT                 [place]         [facility]

 

                URINALYSIS      2022-01-15 23:09:00 Neeta Maria [facility]

 

                BLOOD CULTURE SCREEN 2022-01-15 23:04:00 Neeta Maria [facility]

 

                D-DIMER         2022-01-15 22:49:00 Neeta Maria [facility]

 

                COVID-19 (ID NOW RAPID 2022-01-15 22:48:00 Neeta Maria [facility]

[facility] [place]



                TESTING)                                        [facility]

 

                COMP. METABOLIC PANEL 2022-01-15 22:17:00 Neeta Maria [facility]

[facility] [place]



                ([id number])                                         [facility]

 

                CBC WITH DIFF   2022-01-15 22:17:00 Neeta Maria [facility]

 

                XR CHEST 1 VW   2022-01-15 21:47:19 Neeta Maria [facility]

 

                COMP. METABOLIC PANEL 2021 22:20:00 Chris Mcpherson [facility]

[facility] [place]



                ([id number])                                         [facility]

 

                CBC WITH DIFF   2021 22:20:00 Chris Mcpherson o

[facility]

 

                CT ABDOMEN PELVIS WO 2021-05-10 00:39:40 Bossman Villa [facility]

[facility] [place]



                CONTRAST                                        [facility]

 

                LIPASE          2021-05-10 00:06:00 Bossman Villa [facility]

 

                COMP. METABOLIC PANEL 2021-05-10 00:06:00 Bossman Villa [facility]



                ([id number])                                         Medical Branch

 

                CBC WITH DIFF   2021-05-10 00:06:00 Bossman Villa [facility]

 

                URINALYSIS      2021-05-10 00:00:00 Bossman Villa [facility]

 

                COVID-19 (ID NOW RAPID 2021-05-10 00:00:00 [facility]



                [facility])                                        [facility]

 

                Cholecystectomy                                 [facility]

 

                Shunt of cerebral                                 [facility]

nn



                ventricle to extracranial                                 



                site                                            







Plan of Care







             Planned Activity Planned Date Details      Comments     Source

 

             Future Scheduled 2023-01-10   Lipid panel               [facility]

s -



             Test         00:00:00     (procedure) [code =              Medical 

Center



                                       79141511]                 

 

             Future Scheduled 2021   INFLUENZA VACCINE              [facility]



             Test         00:00:00     (Season Ended) [code =              Medic

al Center



                                       INFLUENZA VACCINE              



                                       (Season Ended)]              

 

             Future Scheduled 2021   DEPRESSION SCREENING              [facility]



             Test         00:00:00     (12+) [code =              Medical Center



                                       DEPRESSION SCREENING              



                                       (12+)]                    

 

             Future Scheduled 2020-04-10   Hemoglobin A1c              [facility]

[facility] -



             Test         00:00:00     measurement               Medical Center



                                       (procedure) [code =              



                                       20468974]                 

 

             Future Scheduled 2004   DTAP/TDAP/TD VACCINES              [facility]



             Test         00:00:00     (1 - Tdap) [code =              Medical C

enter



                                       DTAP/TDAP/TD VACCINES              



                                       (1 - Tdap)]               

 

             Future Scheduled 2003   HEPATITIS C SCREENING              [facility]



             Test         00:00:00     [code = HEPATITIS C              Medical 

Center



                                       SCREENING]                

 

             Future Scheduled 1997   COVID-19 VACCINE (1)              [facility]



             Test         00:00:00     [code = COVID-19              Medical Abilio

ter



                                       VACCINE (1)]              

 

             Future Scheduled 1995   DIABETIC EYE EXAM              [facility]



             Test         00:00:00     [code = DIABETIC EYE              Medical

 Center



                                       EXAM]                     

 

             Future Scheduled 1995   Urine screening for              [facility]



             Test         00:00:00     protein (procedure)              Medical 

Center



                                       [code = 497496772]              

 

             Future Scheduled 1991   PNEUMOCOCCAL VACCINE              [facility]



             Test         00:00:00     0-64 YRS (1 of 1 -              Medical C

enter



                                       PPSV23) [code =              



                                       PNEUMOCOCCAL VACCINE              



                                       0-64 YRS (1 of 1 -              



                                       PPSV23)]                  







Encounters







        Start   End     Encounter Admission Attending Care    Care    Encounter 

Source



        Date/Time Date/Time Type    Type    Clinicians Facility Department ID   

   

 

        2022         Outpatient                 [facility]     -[id number]

 [place]



        11:10:01                                                 [id number]    Health

 

        2022         Outpatient         [facility]  [id number]

 [facility]



        13:45:16                         Domingo                  [id number]   [facility]



                                                                        [facility]



                                                                        l



                                                                        Outpati



                                                                        ent



                                                                        Clinics

 

        2022         Outpatient         Jesusita,  STLMLC  [facility]  [id number]

 [facility]



        13:17:39                         Domingo                  [id number]   [facility]



                                                                        [facility]



                                                                        l



                                                                        Outpati



                                                                        ent



                                                                        Clinics

 

        2022         Outpatient         Jesus[facility]  [id number]

 [facility]



        13:16:34                         Domingo                  [id number]   [facility]



                                                                        [facility]



                                                                        l



                                                                        Outpati



                                                                        ent



                                                                        Clinics

 

        2022 Emergency X       SINGER, [facility]    ERT     [id number]

29 Univers



        22:45:00 01:52:00                 CHRIS melton [facility]

 

        2022 Emergency         Singer, [facility]    [id number]

2685 Univers



        22:45:00 01:52:00                 Chris MCCARTHY [id number]         i

ty of



                                                [place] [id number]         [facility]  [phone]         [address]

 

        2022 Emergency ALLISON KAMARA [facility]    ERT     [id number]

[id number] Univers



        18:08:00 22:51:00                                                 ity [facility]

 

        2022 Emergency         ALLISON Mckeon [facility]    [id number]

[id number] Univers



        18:08:00 22:51:00                 Elena MCCARTHY [id number]         i

ty of



                                                [place] [id number]         [facility]  [phone]         Medi

sarah



                                                        [phone]             Branch

 

        2022 Transition         IMTIAZ Guzman  [id number]

[id number] Univers



        00:00:00 00:00:00 of Care         Dulce LYY   [id number]        

 ity of



                                                [place]   4.2.7.2.[address] [place] [address]



                                                        [phone]         Medi

sarah



                                                        403             Branch

 

        2022 Inpatient X       [facility]    KRISH     [id number]

27 Univers



        19:07:00 19:39:00                 WAWALLY                          [facility]

 

        2022 [facility]         [facility]    [id number]

[id number] 

Univers



        19:07:00 19:39:00 Encounter         Teagan Ibanze [id number] 

        ity of



                                                [place] [address] [place] [address]  [phone]         [address]

 

        2022 Emergency X       [facility]    ERT     [id number]

54 Univers



        14:41:00 22:07:00                 ALMA melton [facility]

 

        2022 Emergency         Cacace, [facility]    [id number]

7030 Univers



        14:41:00 22:07:00                 Alma MCCARTHY [id number]         

ity of



                                                [place] [address] [place] [address]  [phone]         [address]

 

        2022-01-15 2022-01-15 Emergency X       [facility]    ERT     [id number]

25 Univers



        14:49:00 21:33:00                 NEETA melton [facility]

 

        2022-01-15 2022-01-15 Emergency         [facility]    [id number]

0725 Univers



        14:49:00 21:33:00                 Neeta MCCARTHY [id number]         

ity of



                                                [place] 4.2.7.2.6         [facility]  [phone]         [address]

 

        2021 Laboratory         Only, Ang Db Test [facility]    1.2.8

40.114 [id number] 

Univers



        18:00:00 18:15:00 Only            Nicole Llanes [facility]  [id number]      

   ity [facility] 4.2.7.2.686         Eric

as



                                                HÉCTOR?BLEA [phone]         [address]



                                                MEDICAL                 



                                                OFFICE                  



                                                BUILDING                 

 

        2021 Outpatient LESLIE LLANES   [facility]    [id number]

[id number] Univers



        18:00:00 18:00:00                 NICOLE melton [facility]

 

        2021 [facility]                 [facility]  [id number]

 [facility]



        00[facility]:00[facility]00                                                 [facility] 

-



                                                                        [facility]



                                                                        l



                                                                        Outpati



                                                                        ent



                                                                        Clinics

 

        2021 ambulatory                 [facility]  [id number]

 [facility]



        00:00[facility]:00:00                                                 [facility] 

-



                                                                        [facility]



                                                                        l



                                                                        Outpati



                                                                        ent



                                                                        Clinics

 

        [facility]21 [facility]       [facility]     [id number]

74 Univers



        15:54:00 18:34:00                 CHRIS                         g[facility]

 

        2021 Emergency         [facility]    [id number]

8236 Univers



        15:54:00 18:34:00                 Chris MCCARTHY 350.1.13.10         [facility] [id number]         [place]

[facility]  [phone]         [address]

 

        2021-10-12 2021-10-12 Outpatient                 [facility]  [id number]

 [facility]



        00:[facility]:00[facility]00                                                 [facility] 

-



                                                                        [facility]



                                                                        l



                                                                        Outpati



                                                                        ent



                                                                        Clinics

 

        2021 Outpatient                 [facility]  [id number]

 [facility]



        00:[facility]:00[facility]00                                                 [facility] 

-



                                                                        [facility]



                                                                        l



                                                                        Outpati



                                                                        ent



                                                                        Clinics

 

        2021-08-10 2021-08-10 Outpatient                 STLMLC  [facility]  [id number]

 [facility]



        00:[facility] 00:00:00                                                 [facility] 

-



                                                                        [facility]



                                                                        l



                                                                        Outpati



                                                                        ent



                                                                        Clinics

 

        2021 Outpatient                 [facility]  [id number]

 [facility]



        00:00:00 00:00:00                                                 [facility] 

-



                                                                        [facility]



                                                                        l



                                                                        Outpati



                                                                        ent



                                                                        Clinics

 

        2021 Outpatient                 [facility]  [id number]

 [facility]



        00:00:00 00:00:00                                                 [facility] 

-



                                                                        [facility]



                                                                        l



                                                                        Outpati



                                                                        ent



                                                                        Clinics

 

        2021 [facility]         [facility]    [id number]

[id number] [facility]



        18:22:00 22:21:00                 Bossman Mccarthy [id number]        

 ity [facility] [id number]         [facility]  [phone]         [address]

 

        2021 Emergency         [facility]    [id number]

[id number] 



        18:22:00 22:21:00                 [place] 350.1.13.10        

 



                                                [place] [id number]         



                                                [place]  [phone]         



                                                        [facility]             

 

        2021 Emergency X       [facility]    [facility]     [id number]

[id number]



        18:22:00 18:22:00                 FOLUSHO                         [facility]

 

        2019 Phone                   nullFlavo MNA     [id number]

55 [facility]



        16:11:26 04:59:59 Message                 r       Neurosurger 08      l



                                                        y [facility]

 

        2019 Phone                   nullFlavo MNA     [id number]

55 [facility]



        16:16:47 04:59:59 Message                 r       Neurosurger 07      l



                                                        y [facility]

 

        2019-07-15 2019 Phone                   nullFlavo MNA     [phone]

55 [facility]



        15:52:03 04:59:59 Message                 r       Neurosurger 06      l



                                                        y [facility]

 

        2019 Phone                   nullFlavo MNA     [id number]

55 [facility]



        18:55:03 04:59:59 Message                 r       Neurosurger 05      l



                                                        y [facility]

 

        2018 Emergency                 nullFlavo [facility] [id number]

[id number] [facility]



        10:12:00 18:24:00                         leslie Garcia [address] [place] [address]

 

        2018 Outpatient                 [facility]

[id number] [facility]



        11:15:00 11:15:00                         t Lovli         [facility]



                                                Medicine                 Outpati



                                                                        ent



                                                                        Clinics

 

        2018 Outpatient                 [facility]

[id number] [facility]



        11:30:00 11:30:00                         [address] [place]   [address]         [place]

s -



                                                Drive   [facility]                 [facility]



                                                                        ent



                                                                        Clinics

 

        2018 Outpatient                 [facility]

[id number] [facility]



        15:41:00 15:41:00                         t [place]   [address]         [place]

s -



                                                Drive   [facility]                 [facility]



                                                                        ent



                                                                        Clinics

 

        2018 Outpatient                 [facility]

[id number] [facility]



        15:42:00 15:42:00                         t [place]   [address] [facility]         [place]

s -



                                                Drive   [facility]                 [facility]



                                                                        ent



                                                                        Clinics

 

        2018 Outpatient                 [facility]

[id number] [facility]



        11:00:00 11:00:00                         t [place]   [facility]

s [facility]



                                                                        ent



                                                                        Clinics

 

        2018 Inpatient                 [facility] [id number]

[address] [facility]



        22:40:00 17:28:00                         [address]







Results







           Test Description Test Time  Test Comments Results    Result Comments 

Source









                    CBC WITH DIFF       2022 04:47:32 









                      Test Item  Value      Reference Range Interpretation Comme

nts









             WBC (test code = 6690-2)              See_Comment  H             [A

utomated message] The



                                                                 system which ge

nerated this



                                                                 result transmit

huma reference



                                                                 range: 4.20 - 1

0.70 10*3/?L.



                                                                 The reference r

zhen was not



                                                                 used to interpr

et this result



                                                                 as normal/abnor

mal.

 

             RBC (test code = 789-8)              See_Comment                [Au

tomated message] The



                                                                 system which ge

nerated this



                                                                 result transmit

huma reference



                                                                 range: 4.26 - 5

.52 10*6/?L.



                                                                 The reference r

zhen was not



                                                                 used to interpr

et this result



                                                                 as normal/abnor

mal.

 

             HGB (test code = 718-7) 16.1 g/dL    12.2-16.4                 

 

             HCT (test code = 4544-3) 47.2 %       38.4-49.3                 

 

             MCV (test code = 787-2) 86.1 fL      81.7-95.6                 

 

             MCH (test code = 785-6) 29.4 pg      26.1-32.7                 

 

             MCHC (test code = 786-4) 34.1 g/dL    31.2-35.0                 

 

             RDW-SD (test code = 38544-5) 45.7 fL      38.5-51.6                

 

 

             RDW-CV (test code = 788-0) 14.6 %       12.1-15.4                 

 

             PLT (test code = 777-3)              See_Comment                [Au

tomated message] The



                                                                 system which ge

nerated this



                                                                 result transmit

huma reference



                                                                 range: 150 - 32

8 10*3/?L. The



                                                                 reference range

 was not used



                                                                 to interpret th

is result as



                                                                 normal/abnormal

.

 

             MPV (test code = 66129-5) 11.0 fL      9.8-13.0                  

 

             NRBC/100 WBC (test code =              See_Comment                [

Automated message] The



             [id number])                                         system which ge

nerated this



                                                                 result transmit

huma reference



                                                                 range: 0.0 - 10

.0 /100 WBCs.



                                                                 The reference r

zhen was not



                                                                 used to interpr

et this result



                                                                 as normal/abnor

mal.

 

             NRBC x10^3 (test code = <0.01        See_Comment                [Au

tomated message] The



             [id number])                                         system which ge

nerated this



                                                                 result transmit

huma reference



                                                                 range: 10*3/?L.

 The reference



                                                                 range was not u

sed to



                                                                 interpret this 

result as



                                                                 normal/abnormal

.

 

             GRAN MAT (NEUT) % (test code 72.2 %                                

 



             = 770-8)                                            

 

             IMM GRAN % (test code = 0.60 %                                 



             [id number])                                         

 

             LYMPH % (test code = 736-9) 17.7 %                                 

 

             MONO % (test code = 5905-5) 7.4 %                                  

 

             EOS % (test code = 713-8) 1.8 %                                  

 

             BASO % (test code = 706-2) 0.3 %                                  

 

             GRAN MAT x10^3(ANC) (test 9.09 10*3/uL 1.99-6.95    H            



             code = 1083214150)                                        

 

             IMM GRAN x10^3 (test code = 0.07 10*3/uL 0.00-0.06    H            



             [id number])                                         

 

             LYMPH x10^3 (test code = 2.22 10*3/uL 1.09-3.23                 



             731-0)                                              

 

             MONO x10^3 (test code = 0.93 10*3/uL 0.36-1.02                 



             742-7)                                              

 

             EOS x10^3 (test code = 0.22 10*3/uL 0.06-0.53                 



             711-2)                                              

 

             BASO x10^3 (test code = 0.04 10*3/uL 0.01-0.09                 



             704-7)                                              

 

             Lab Interpretation (test Abnormal                               



             code = 80928-2)                                        



[facility]. METABOLIC PANEL (86154)2022 
04:36:41





             Test Item    Value        Reference Range Interpretation Comments

 

             NA (test code = 138 mmol/L   135-145                   



             8660700305)                                         

 

             K (test code = 4.6 mmol/L   3.5-5.0                   



             1488237186)                                         

 

             CL (test code = 105 mmol/L                       



             7225097594)                                         

 

             CO2 TOTAL (test code = 21 mmol/L    23-31        L            



             7716588083)                                         

 

             AGAP (test code =              2-16                      



             8863249238)                                         

 

             BUN (test code = 13 mg/dL     7-23                      



             9473124776)                                         

 

             GLUCOSE (test code = 167 mg/dL           H            



             4099249904)                                         

 

             CREATININE (test code = 0.48 mg/dL   0.60-1.25    L            



             6163094791)                                         

 

             TOTAL BILI (test code = 0.8 mg/dL    0.1-1.1                   



             5663747001)                                         

 

             CALCIUM (test code = 9.3 mg/dL    8.6-10.6                  



             9193005876)                                         

 

             T PROTEIN (test code = 7.6 g/dL     6.3-8.2                   



             3575098836)                                         

 

             ALBUMIN (test code = 4.2 g/dL     3.5-5.0                   



             8228098879)                                         

 

             ALK PHOS (test code = 98 U/L                           



             0375055818)                                         

 

             ALTv (test code = 71 U/L       5-50         H            



             1742-6)                                             

 

             AST(SGOT) (test code = 36 U/L       13-40                     



             9838702463)                                         

 

             eGFR (test code =              mL/min/1.73m2              



             4529236127)                                         

 

             MAL (test code = MAL) Association of                           



                          Glomerular Filtration                           



                          Rate (GFR) and Staging                           



                          of Kidney Disease*                           



                          +---------------------                           



                          --+-------------------                           



                          --+-------------------                           



                          ------+| GFR                           



                          (mL/min/1.73 m2) ?|                           



                          With Kidney Damage ?|                           



                          ?Without Kidney                           



                          Damage+---------------                           



                          --------+-------------                           



                          --------+-------------                           



                          ------------+| ?>90 ?                           



                          ? ? ? ? ? ? ? ?|                           



                          ?Stage one ? ? ? ? ?|                           



                          ? Normal ? ? ? ? ? ? ?                           



                          ?+--------------------                           



                          ---+------------------                           



                          ---+------------------                           



                          -------+| ?60-89 ? ? ?                           



                          ? ? ? ? ?| ?Stage two                           



                          ? ? ? ? ?| ? Decreased                           



                          GFR ? ? ? ?                            



                          +---------------------                           



                          --+-------------------                           



                          --+-------------------                           



                          ------+| ?30-59 ? ? ?                           



                          ? ? ? ? ?| ?Stage                           



                          three ? ? ? ?| ? Stage                           



                          three ? ? ? ? ?                           



                          +---------------------                           



                          --+-------------------                           



                          --+-------------------                           



                          ------+| ?15-29 ? ? ?                           



                          ? ? ? ? ?| ?Stage four                           



                          ? ? ? ? | ? Stage four                           



                          ? ? ? ? ?                              



                          ?+--------------------                           



                          ---+------------------                           



                          ---+------------------                           



                          -------+| ?<15 (or                           



                          dialysis) ? ?| ?Stage                           



                          five ? ? ? ? | ? Stage                           



                          five ? ? ? ? ?                           



                          ?+--------------------                           



                          ---+------------------                           



                          ---+------------------                           



                          -------+ *Each stage                           



                          assumes the associated                           



                          GFR level has been in                           



                          effect for at least                           



                          three months. ?Stages                           



                          1 to 5, with or                           



                          without kidney                           



                          disease, indicate                           



                          chronic kidney                           



                          disease. Notes:                           



                          Determination of                           



                          stages one and two                           



                          (with eGFR                             



                          >59mL/min/1.73 m2)                           



                          requires estimation of                           



                          kidney damage for at                           



                          least three months as                           



                          defined by structural                           



                          or functional                           



                          abnormalities of the                           



                          kidney, manifested by                           



                          either:Pathological                           



                          abnormalities or                           



                          Markers of kidney                           



                          damage (including                           



                          abnormalities in the                           



                          composition of the                           



                          blood or urine or                           



                          abnormalities in                           



                          imaging tests).                           

 

             Lab Interpretation Abnormal                               



             (test code = 61997-6)                                        



[facility]





             Test Item    Value        Reference Range Interpretation Comments

 

             MAGNESIUM (test code = 0321329800) 1.6 mg/dL    1.7-2.4      L     

       

 

             Lab Interpretation (test code = Abnormal                           

    



             61366-9)                                            



[facility] WITH [id number] 00:23:28





             Test Item    Value        Reference Range Interpretation Comments

 

             WBC (test code =              See_Comment  H             [Automated



             6690-2)                                             message] The sy

stem



                                                                 which generated



                                                                 this result



                                                                 transmitted



                                                                 reference range

:



                                                                 4.20 - 10.70



                                                                 10*3/?L. The



                                                                 reference range

 was



                                                                 not used to



                                                                 interpret this



                                                                 result as



                                                                 normal/abnormal

.

 

             RBC (test code =              See_Comment  H             [Automated



             789-8)                                              message] The sy

stem



                                                                 which generated



                                                                 this result



                                                                 transmitted



                                                                 reference range

:



                                                                 4.26 - 5.52



                                                                 10*6/?L. The



                                                                 reference range

 was



                                                                 not used to



                                                                 interpret this



                                                                 result as



                                                                 normal/abnormal

.

 

             HGB (test code = 18.3 g/dL    12.2-16.4    H            



             718-7)                                              

 

             HCT (test code = 55.6 %       38.4-49.3    H            



             4544-3)                                             

 

             MCV (test code = 89.0 fL      81.7-95.6                 



             787-2)                                              

 

             MCH (test code = 29.3 pg      26.1-32.7                 



             785-6)                                              

 

             MCHC (test code = 32.9 g/dL    31.2-35.0                 



             786-4)                                              

 

             RDW-SD (test code = 47.6 fL      38.5-51.6                 



             93609-6)                                            

 

             RDW-CV (test code = 15.1 %       12.1-15.4                 



             788-0)                                              

 

             PLT (test code =              See_Comment  H             [Automated



             777-3)                                              message] The sy

stem



                                                                 which generated



                                                                 this result



                                                                 transmitted



                                                                 reference range

:



                                                                 150 - 328 10*3/

?L.



                                                                 The reference r

zhen



                                                                 was not used to



                                                                 interpret this



                                                                 result as



                                                                 normal/abnormal

.

 

             MPV (test code = 10.5 fL      9.8-13.0                  



             91866-6)                                            

 

             NRBC/100 WBC (test              See_Comment                [Automat

ed



             code = 2032525895)                                        message] 

The system



                                                                 which generated



                                                                 this result



                                                                 transmitted



                                                                 reference range

:



                                                                 0.0 - 10.0 /100



                                                                 WBCs. The refer

ence



                                                                 range was not u

sed



                                                                 to interpret th

is



                                                                 result as



                                                                 normal/abnormal

.

 

             NRBC x10^3 (test code <0.01        See_Comment                [Auto

mated



             = [id number])                                        message] The s

ystem



                                                                 which generated



                                                                 this result



                                                                 transmitted



                                                                 reference range

:



                                                                 10*3/?L. The



                                                                 reference range

 was



                                                                 not used to



                                                                 interpret this



                                                                 result as



                                                                 normal/abnormal

.

 

             GRAN MAT (NEUT) % 66.7 %                                 



             (test code = 770-8)                                        

 

             IMM GRAN % (test code 0.40 %                                 



             = 5326691733)                                        

 

             LYMPH % (test code = 24.4 %                                 



             736-9)                                              

 

             MONO % (test code = 6.6 %                                  



             5905-5)                                             

 

             EOS % (test code = 1.5 %                                  



             713-8)                                              

 

             BASO % (test code = 0.4 %                                  



             706-2)                                              

 

             GRAN MAT x10^3(ANC) 7.43 10*3/uL 1.99-6.95    H            



             (test code =                                        



             2055695664)                                         

 

             IMM GRAN x10^3 (test 0.04 10*3/uL 0.00-0.06                 



             code = 9249558859)                                        

 

             LYMPH x10^3 (test code 2.72 10*3/uL 1.09-3.23                 



             = 731-0)                                            

 

             MONO x10^3 (test code 0.74 10*3/uL 0.36-1.02                 



             = 742-7)                                            

 

             EOS x10^3 (test code = 0.17 10*3/uL 0.06-0.53                 



             711-2)                                              

 

             BASO x10^3 (test code 0.04 10*3/uL 0.01-0.09                 



             = 704-7)                                            

 

             Lab Interpretation Abnormal                               



             (test code = 28959-3)                                        



[facility]. METABOLIC PANEL ([id number])2022 
00:18:07





             Test Item    Value        Reference Range Interpretation Comments

 

             NA (test code = 139 mmol/L   135-145                   



             7341533181)                                         

 

             K (test code = 4.8 mmol/L   3.5-5.0                   



             3854104999)                                         

 

             CL (test code = 104 mmol/L                       



             6811797857)                                         

 

             CO2 TOTAL (test code = 22 mmol/L    23-31        L            



             0368756431)                                         

 

             AGAP (test code =              2-16                      



             0605639109)                                         

 

             BUN (test code = 15 mg/dL     7-23                      



             5963777455)                                         

 

             GLUCOSE (test code = 93 mg/dL                         



             5362467258)                                         

 

             CREATININE (test code = 0.67 mg/dL   0.60-1.25                 



             3572630627)                                         

 

             TOTAL BILI (test code = 0.7 mg/dL    0.1-1.1                   



             6623608879)                                         

 

             CALCIUM (test code = 9.8 mg/dL    8.6-10.6                  



             4066455940)                                         

 

             T PROTEIN (test code = 8.9 g/dL     6.3-8.2      H            



             5735235213)                                         

 

             ALBUMIN (test code = 4.9 g/dL     3.5-5.0                   



             0970945458)                                         

 

             ALK PHOS (test code = 126 U/L             H            



             5106732428)                                         

 

             ALTv (test code = 43 U/L       5-50                      



             1742-6)                                             

 

             AST(SGOT) (test code = 28 U/L       13-40                     



             6723190255)                                         

 

             eGFR (test code =              mL/min/1.73m2              



             9431040679)                                         

 

             MAL (test code = MAL) Association of                           



                          Glomerular Filtration                           



                          Rate (GFR) and Staging                           



                          of Kidney Disease*                           



                          +---------------------                           



                          --+-------------------                           



                          --+-------------------                           



                          ------+| GFR                           



                          (mL/min/1.73 m2) ?|                           



                          With Kidney Damage ?|                           



                          ?Without Kidney                           



                          Damage+---------------                           



                          --------+-------------                           



                          --------+-------------                           



                          ------------+| ?>90 ?                           



                          ? ? ? ? ? ? ? ?|                           



                          ?Stage one ? ? ? ? ?|                           



                          ? Normal ? ? ? ? ? ? ?                           



                          ?+--------------------                           



                          ---+------------------                           



                          ---+------------------                           



                          -------+| ?60-89 ? ? ?                           



                          ? ? ? ? ?| ?Stage two                           



                          ? ? ? ? ?| ? Decreased                           



                          GFR ? ? ? ?                            



                          +---------------------                           



                          --+-------------------                           



                          --+-------------------                           



                          ------+| ?30-59 ? ? ?                           



                          ? ? ? ? ?| ?Stage                           



                          three ? ? ? ?| ? Stage                           



                          three ? ? ? ? ?                           



                          +---------------------                           



                          --+-------------------                           



                          --+-------------------                           



                          ------+| ?15-29 ? ? ?                           



                          ? ? ? ? ?| ?Stage four                           



                          ? ? ? ? | ? Stage four                           



                          ? ? ? ? ?                              



                          ?+--------------------                           



                          ---+------------------                           



                          ---+------------------                           



                          -------+| ?<15 (or                           



                          dialysis) ? ?| ?Stage                           



                          five ? ? ? ? | ? Stage                           



                          five ? ? ? ? ?                           



                          ?+--------------------                           



                          ---+------------------                           



                          ---+------------------                           



                          -------+ *Each stage                           



                          assumes the associated                           



                          GFR level has been in                           



                          effect for at least                           



                          three months. ?Stages                           



                          1 to 5, with or                           



                          without kidney                           



                          disease, indicate                           



                          chronic kidney                           



                          disease. Notes:                           



                          Determination of                           



                          stages one and two                           



                          (with eGFR                             



                          >59mL/min/1.73 m2)                           



                          requires estimation of                           



                          kidney damage for at                           



                          least three months as                           



                          defined by structural                           



                          or functional                           



                          abnormalities of the                           



                          kidney, manifested by                           



                          either:Pathological                           



                          abnormalities or                           



                          Markers of kidney                           



                          damage (including                           



                          abnormalities in the                           



                          composition of the                           



                          blood or urine or                           



                          abnormalities in                           



                          imaging tests).                           

 

             Lab Interpretation Abnormal                               



             (test code = 50113-0)                                        



[facility]. METABOLIC PANEL ([id number] 
12:48:40





             Test Item    Value        Reference Range Interpretation Comments

 

             NA (test code = 137 mmol/L   135-145                   



             9576738036)                                         

 

             K (test code = 4.5 mmol/L   3.5-5.0                   



             3079250307)                                         

 

             CL (test code = 107 mmol/L                       



             2651989963)                                         

 

             CO2 TOTAL (test code = 24 mmol/L    23-31                     



             6501625016)                                         

 

             AGAP (test code =              2-16                      



             5463914095)                                         

 

             BUN (test code = 16 mg/dL     7-23                      



             3535560743)                                         

 

             GLUCOSE (test code = 116 mg/dL           H            



             1592569374)                                         

 

             CREATININE (test code = 0.62 mg/dL   0.60-1.25                 



             4062770780)                                         

 

             TOTAL BILI (test code = 0.4 mg/dL    0.1-1.1                   



             7318299872)                                         

 

             CALCIUM (test code = 8.7 mg/dL    8.6-10.6                  



             1880418953)                                         

 

             T PROTEIN (test code = 7.5 g/dL     6.3-8.2                   



             7127240123)                                         

 

             ALBUMIN (test code = 3.9 g/dL     3.5-5.0                   



             4255332445)                                         

 

             ALK PHOS (test code = 93 U/L                           



             1328846152)                                         

 

             ALTv (test code = 40 U/L       5-50                      



             1742-6)                                             

 

             AST(SGOT) (test code = 51 U/L       13-40        H            



             1682245022)                                         

 

             eGFR (test code =              mL/min/1.73m2              



             4544363121)                                         

 

             MAL (test code = MAL) Association of                           



                          Glomerular Filtration                           



                          Rate (GFR) and Staging                           



                          of Kidney Disease*                           



                          +---------------------                           



                          --+-------------------                           



                          --+-------------------                           



                          ------+| GFR                           



                          (mL/min/1.73 m2) ?|                           



                          With Kidney Damage ?|                           



                          ?Without Kidney                           



                          Damage+---------------                           



                          --------+-------------                           



                          --------+-------------                           



                          ------------+| ?>90 ?                           



                          ? ? ? ? ? ? ? ?|                           



                          ?Stage one ? ? ? ? ?|                           



                          ? Normal ? ? ? ? ? ? ?                           



                          ?+--------------------                           



                          ---+------------------                           



                          ---+------------------                           



                          -------+| ?60-89 ? ? ?                           



                          ? ? ? ? ?| ?Stage two                           



                          ? ? ? ? ?| ? Decreased                           



                          GFR ? ? ? ?                            



                          +---------------------                           



                          --+-------------------                           



                          --+-------------------                           



                          ------+| ?30-59 ? ? ?                           



                          ? ? ? ? ?| ?Stage                           



                          three ? ? ? ?| ? Stage                           



                          three ? ? ? ? ?                           



                          +---------------------                           



                          --+-------------------                           



                          --+-------------------                           



                          ------+| ?15-29 ? ? ?                           



                          ? ? ? ? ?| ?Stage four                           



                          ? ? ? ? | ? Stage four                           



                          ? ? ? ? ?                              



                          ?+--------------------                           



                          ---+------------------                           



                          ---+------------------                           



                          -------+| ?<15 (or                           



                          dialysis) ? ?| ?Stage                           



                          five ? ? ? ? | ? Stage                           



                          five ? ? ? ? ?                           



                          ?+--------------------                           



                          ---+------------------                           



                          ---+------------------                           



                          -------+ *Each stage                           



                          assumes the associated                           



                          GFR level has been in                           



                          effect for at least                           



                          three months. ?Stages                           



                          1 to 5, with or                           



                          without kidney                           



                          disease, indicate                           



                          chronic kidney                           



                          disease. Notes:                           



                          Determination of                           



                          stages one and two                           



                          (with eGFR                             



                          >59mL/min/1.73 m2)                           



                          requires estimation of                           



                          kidney damage for at                           



                          least three months as                           



                          defined by structural                           



                          or functional                           



                          abnormalities of the                           



                          kidney, manifested by                           



                          either:Pathological                           



                          abnormalities or                           



                          Markers of kidney                           



                          damage (including                           



                          abnormalities in the                           



                          composition of the                           



                          blood or urine or                           



                          abnormalities in                           



                          imaging tests).                           

 

             Lab Interpretation Abnormal                               



             (test code = 83461-4)                                        



[facility] WITH [id number] 12:21:36





             Test Item    Value        Reference Range Interpretation Comments

 

             WBC (test code =              See_Comment                [Automated



             6548-2)                                             message] The sy

stem



                                                                 which generated



                                                                 this result



                                                                 transmitted



                                                                 reference range

:



                                                                 4.20 - 10.70



                                                                 10*3/?L. The



                                                                 reference range

 was



                                                                 not used to



                                                                 interpret this



                                                                 result as



                                                                 normal/abnormal

.

 

             RBC (test code =              See_Comment                [Automated



             789-8)                                              message] The sy

stem



                                                                 which generated



                                                                 this result



                                                                 transmitted



                                                                 reference range

:



                                                                 4.26 - 5.52



                                                                 10*6/?L. The



                                                                 reference range

 was



                                                                 not used to



                                                                 interpret this



                                                                 result as



                                                                 normal/abnormal

.

 

             HGB (test code = 15.7 g/dL    12.2-16.4                 



             718-7)                                              

 

             HCT (test code = 48.0 %       38.4-49.3                 



             4544-3)                                             

 

             MCV (test code = 90.1 fL      81.7-95.6                 



             787-2)                                              

 

             MCH (test code = 29.5 pg      26.1-32.7                 



             785-6)                                              

 

             MCHC (test code = 32.7 g/dL    31.2-35.0                 



             786-4)                                              

 

             RDW-SD (test code = 50.6 fL      38.5-51.6                 



             99984-6)                                            

 

             RDW-CV (test code = 15.3 %       12.1-15.4                 



             788-0)                                              

 

             PLT (test code =              See_Comment  H             [Automated



             777-3)                                              message] The sy

stem



                                                                 which generated



                                                                 this result



                                                                 transmitted



                                                                 reference range

:



                                                                 150 - 328 10*3/

?L.



                                                                 The reference r

zhen



                                                                 was not used to



                                                                 interpret this



                                                                 result as



                                                                 normal/abnormal

.

 

             MPV (test code = 10.3 fL      9.8-13.0                  



             13304-4)                                            

 

             NRBC/100 WBC (test              See_Comment                [Automat

ed



             code = 0004946346)                                        message] 

The system



                                                                 which generated



                                                                 this result



                                                                 transmitted



                                                                 reference range

:



                                                                 0.0 - 10.0 /100



                                                                 WBCs. The refer

ence



                                                                 range was not u

sed



                                                                 to interpret th

is



                                                                 result as



                                                                 normal/abnormal

.

 

             NRBC x10^3 (test code <0.01        See_Comment                [Auto

mated



             = [id number])                                        message] The s

ystem



                                                                 which generated



                                                                 this result



                                                                 transmitted



                                                                 reference range

:



                                                                 10*3/?L. The



                                                                 reference range

 was



                                                                 not used to



                                                                 interpret this



                                                                 result as



                                                                 normal/abnormal

.

 

             GRAN MAT (NEUT) % 61.5 %                                 



             (test code = 770-8)                                        

 

             IMM GRAN % (test code 0.80 %                                 



             = 0929312031)                                        

 

             LYMPH % (test code = 27.8 %                                 



             736-9)                                              

 

             MONO % (test code = 6.9 %                                  



             5905-5)                                             

 

             EOS % (test code = 2.4 %                                  



             713-8)                                              

 

             BASO % (test code = 0.6 %                                  



             706-2)                                              

 

             GRAN MAT x10^3(ANC) 6.07 10*3/uL 1.99-6.95                 



             (test code =                                        



             7694128499)                                         

 

             IMM GRAN x10^3 (test 0.08 10*3/uL 0.00-0.06    H            



             code = 1767932460)                                        

 

             LYMPH x10^3 (test code 2.75 10*3/uL 1.09-3.23                 



             = 731-0)                                            

 

             MONO x10^3 (test code 0.68 10*3/uL 0.36-1.02                 



             = 742-7)                                            

 

             EOS x10^3 (test code = 0.24 10*3/uL 0.06-0.53                 



             711-2)                                              

 

             BASO x10^3 (test code 0.06 10*3/uL 0.01-0.09                 



             = 704-7)                                            

 

             Lab Interpretation Abnormal                               



             (test code = 87292-5)                                        



[facility] Metabolic Panel (NA, K, CL, CO2, 
GLUCOSE, BUN, CREATININE, CA)2022 12:40:30





             Test Item    Value        Reference Range Interpretation Comments

 

             NA (test code = 139 mmol/L   135-145                   



             0994281370)                                         

 

             K (test code = 3.9 mmol/L   3.5-5.0                   



             0281689332)                                         

 

             CL (test code = 108 mmol/L                       



             9503950596)                                         

 

             CO2 TOTAL (test code = 25 mmol/L    23-31                     



             6585280684)                                         

 

             AGAP (test code =              2-16                      



             9870390059)                                         

 

             BUN (test code = 14 mg/dL     7-23                      



             6831150534)                                         

 

             GLUCOSE (test code = 107 mg/dL                        



             0323921197)                                         

 

             CREATININE (test code = 0.61 mg/dL   0.60-1.25                 



             7379799507)                                         

 

             CALCIUM (test code = 8.1 mg/dL    8.6-10.6     L            



             8389714086)                                         

 

             eGFR (test code =              mL/min/1.73m2              



             8985967109)                                         

 

             MAL (test code = MAL) Association of                           



                          Glomerular Filtration                           



                          Rate (GFR) and Staging                           



                          of Kidney Disease*                           



                          +---------------------                           



                          --+-------------------                           



                          --+-------------------                           



                          ------+| GFR                           



                          (mL/min/1.73 m2) ?|                           



                          With Kidney Damage ?|                           



                          ?Without Kidney                           



                          Damage+---------------                           



                          --------+-------------                           



                          --------+-------------                           



                          ------------+| ?>90 ?                           



                          ? ? ? ? ? ? ? ?|                           



                          ?Stage one ? ? ? ? ?|                           



                          ? Normal ? ? ? ? ? ? ?                           



                          ?+--------------------                           



                          ---+------------------                           



                          ---+------------------                           



                          -------+| ?60-89 ? ? ?                           



                          ? ? ? ? ?| ?Stage two                           



                          ? ? ? ? ?| ? Decreased                           



                          GFR ? ? ? ?                            



                          +---------------------                           



                          --+-------------------                           



                          --+-------------------                           



                          ------+| ?30-59 ? ? ?                           



                          ? ? ? ? ?| ?Stage                           



                          three ? ? ? ?| ? Stage                           



                          three ? ? ? ? ?                           



                          +---------------------                           



                          --+-------------------                           



                          --+-------------------                           



                          ------+| ?15-29 ? ? ?                           



                          ? ? ? ? ?| ?Stage four                           



                          ? ? ? ? | ? Stage four                           



                          ? ? ? ? ?                              



                          ?+--------------------                           



                          ---+------------------                           



                          ---+------------------                           



                          -------+| ?<15 (or                           



                          dialysis) ? ?| ?Stage                           



                          five ? ? ? ? | ? Stage                           



                          five ? ? ? ? ?                           



                          ?+--------------------                           



                          ---+------------------                           



                          ---+------------------                           



                          -------+ *Each stage                           



                          assumes the associated                           



                          GFR level has been in                           



                          effect for at least                           



                          three months. ?Stages                           



                          1 to 5, with or                           



                          without kidney                           



                          disease, indicate                           



                          chronic kidney                           



                          disease. Notes:                           



                          Determination of                           



                          stages one and two                           



                          (with eGFR                             



                          >59mL/min/1.73 m2)                           



                          requires estimation of                           



                          kidney damage for at                           



                          least three months as                           



                          defined by structural                           



                          or functional                           



                          abnormalities of the                           



                          kidney, manifested by                           



                          either:Pathological                           



                          abnormalities or                           



                          Markers of kidney                           



                          damage (including                           



                          abnormalities in the                           



                          composition of the                           



                          blood or urine or                           



                          abnormalities in                           



                          imaging tests).                           

 

             Lab Interpretation Abnormal                               



             (test code = 86502-5)                                        



[facility] with [id number] 12:23:51





             Test Item    Value        Reference Range Interpretation Comments

 

             WBC (test code =              See_Comment                [Automated



             6691-2)                                             message] The sy

stem



                                                                 which generated



                                                                 this result



                                                                 transmitted



                                                                 reference range

:



                                                                 4.20 - 10.70



                                                                 10*3/?L. The



                                                                 reference range

 was



                                                                 not used to



                                                                 interpret this



                                                                 result as



                                                                 normal/abnormal

.

 

             RBC (test code =              See_Comment                [Automated



             993-2)                                              message] The sy

stem



                                                                 which generated



                                                                 this result



                                                                 transmitted



                                                                 reference range

:



                                                                 4.26 - 5.52



                                                                 10*6/?L. The



                                                                 reference range

 was



                                                                 not used to



                                                                 interpret this



                                                                 result as



                                                                 normal/abnormal

.

 

             HGB (test code = 14.9 g/dL    12.2-16.4                 



             718-7)                                              

 

             HCT (test code = 44.2 %       38.4-49.3                 



             4544-3)                                             

 

             MCV (test code = 88.4 fL      81.7-95.6                 



             787-2)                                              

 

             MCH (test code = 29.8 pg      26.1-32.7                 



             785-6)                                              

 

             MCHC (test code = 33.7 g/dL    31.2-35.0                 



             786-4)                                              

 

             RDW-SD (test code = 49.3 fL      38.5-51.6                 



             95843-2)                                            

 

             RDW-CV (test code = 15.3 %       12.1-15.4                 



             788-0)                                              

 

             PLT (test code =              See_Comment  H             [Automated



             777-3)                                              message] The sy

stem



                                                                 which generated



                                                                 this result



                                                                 transmitted



                                                                 reference range

:



                                                                 150 - 328 10*3/

?L.



                                                                 The reference r

zhen



                                                                 was not used to



                                                                 interpret this



                                                                 result as



                                                                 normal/abnormal

.

 

             MPV (test code = 10.2 fL      9.8-13.0                  



             56189-1)                                            

 

             NRBC/100 WBC (test              See_Comment                [Automat

ed



             code = 1120513148)                                        message] 

The system



                                                                 which generated



                                                                 this result



                                                                 transmitted



                                                                 reference range

:



                                                                 0.0 - 10.0 /100



                                                                 WBCs. The refer

ence



                                                                 range was not u

sed



                                                                 to interpret th

is



                                                                 result as



                                                                 normal/abnormal

.

 

             NRBC x10^3 (test code <0.01        See_Comment                [Auto

mated



             = 1213953616)                                        message] The s

ystem



                                                                 which generated



                                                                 this result



                                                                 transmitted



                                                                 reference range

:



                                                                 10*3/?L. The



                                                                 reference range

 was



                                                                 not used to



                                                                 interpret this



                                                                 result as



                                                                 normal/abnormal

.

 

             GRAN MAT (NEUT) % 56.7 %                                 



             (test code = 770-8)                                        

 

             IMM GRAN % (test code 0.60 %                                 



             = 4779029129)                                        

 

             LYMPH % (test code = 31.1 %                                 



             736-9)                                              

 

             MONO % (test code = 8.7 %                                  



             5905-5)                                             

 

             EOS % (test code = 2.4 %                                  



             713-8)                                              

 

             BASO % (test code = 0.5 %                                  



             706-2)                                              

 

             GRAN MAT x10^3(ANC) 4.83 10*3/uL 1.99-6.95                 



             (test code =                                        



             8081529044)                                         

 

             IMM GRAN x10^3 (test 0.05 10*3/uL 0.00-0.06                 



             code = 4375770447)                                        

 

             LYMPH x10^3 (test code 2.64 10*3/uL 1.09-3.23                 



             = 731-0)                                            

 

             MONO x10^3 (test code 0.74 10*3/uL 0.36-1.02                 



             = 742-7)                                            

 

             EOS x10^3 (test code = 0.20 10*3/uL 0.06-0.53                 



             711-2)                                              

 

             BASO x10^3 (test code 0.04 10*3/uL 0.01-0.09                 



             = 704-7)                                            

 

             Lab Interpretation Abnormal                               



             (test code = 16249-2)                                        



[facility]. METABOLIC PANEL (27839)2022 
06:32:14





             Test Item    Value        Reference Range Interpretation Comments

 

             NA (test code = 139 mmol/L   135-145                   



             5625346366)                                         

 

             K (test code = 4.5 mmol/L   3.5-5.0                   



             6302193277)                                         

 

             CL (test code = 102 mmol/L                       



             0529094056)                                         

 

             CO2 TOTAL (test code = 26 mmol/L    23-31                     



             8208582725)                                         

 

             AGAP (test code =              2-16                      



             1395343354)                                         

 

             BUN (test code = 16 mg/dL     7-23                      



             5670373367)                                         

 

             GLUCOSE (test code = 99 mg/dL                         



             8111919013)                                         

 

             CREATININE (test code = 0.83 mg/dL   0.60-1.25                 



             6395247800)                                         

 

             TOTAL BILI (test code = 0.6 mg/dL    0.1-1.1                   



             6636305619)                                         

 

             CALCIUM (test code = 9.5 mg/dL    8.6-10.6                  



             1747781563)                                         

 

             T PROTEIN (test code = 8.6 g/dL     6.3-8.2      H            



             7397231094)                                         

 

             ALBUMIN (test code = 4.6 g/dL     3.5-5.0                   



             3313893000)                                         

 

             ALK PHOS (test code = 121 U/L                          



             4093543489)                                         

 

             ALTv (test code = 58 U/L       5-50         H            



             1742-6)                                             

 

             AST(SGOT) (test code = 32 U/L       13-40                     



             8497242104)                                         

 

             eGFR (test code =              mL/min/1.73m2              



             [id number])                                         

 

             MAL (test code = MAL) Association of                           



                          Glomerular Filtration                           



                          Rate (GFR) and Staging                           



                          of Kidney Disease*                           



                          +---------------------                           



                          --+-------------------                           



                          --+-------------------                           



                          ------+| GFR                           



                          (mL/min/1.73 m2) ?|                           



                          With Kidney Damage ?|                           



                          ?Without Kidney                           



                          Damage+---------------                           



                          --------+-------------                           



                          --------+-------------                           



                          ------------+| ?>90 ?                           



                          ? ? ? ? ? ? ? ?|                           



                          ?Stage one ? ? ? ? ?|                           



                          ? Normal ? ? ? ? ? ? ?                           



                          ?+--------------------                           



                          ---+------------------                           



                          ---+------------------                           



                          -------+| ?60-89 ? ? ?                           



                          ? ? ? ? ?| ?Stage two                           



                          ? ? ? ? ?| ? Decreased                           



                          GFR ? ? ? ?                            



                          +---------------------                           



                          --+-------------------                           



                          --+-------------------                           



                          ------+| ?30-59 ? ? ?                           



                          ? ? ? ? ?| ?Stage                           



                          three ? ? ? ?| ? Stage                           



                          three ? ? ? ? ?                           



                          +---------------------                           



                          --+-------------------                           



                          --+-------------------                           



                          ------+| ?15-29 ? ? ?                           



                          ? ? ? ? ?| ?Stage four                           



                          ? ? ? ? | ? Stage four                           



                          ? ? ? ? ?                              



                          ?+--------------------                           



                          ---+------------------                           



                          ---+------------------                           



                          -------+| ?<15 (or                           



                          dialysis) ? ?| ?Stage                           



                          five ? ? ? ? | ? Stage                           



                          five ? ? ? ? ?                           



                          ?+--------------------                           



                          ---+------------------                           



                          ---+------------------                           



                          -------+ *Each stage                           



                          assumes the associated                           



                          GFR level has been in                           



                          effect for at least                           



                          three months. ?Stages                           



                          1 to 5, with or                           



                          without kidney                           



                          disease, indicate                           



                          chronic kidney                           



                          disease. Notes:                           



                          Determination of                           



                          stages one and two                           



                          (with eGFR                             



                          >59mL/min/1.73 m2)                           



                          requires estimation of                           



                          kidney damage for at                           



                          least three months as                           



                          defined by structural                           



                          or functional                           



                          abnormalities of the                           



                          kidney, manifested by                           



                          either:Pathological                           



                          abnormalities or                           



                          Markers of kidney                           



                          damage (including                           



                          abnormalities in the                           



                          composition of the                           



                          blood or urine or                           



                          abnormalities in                           



                          imaging tests).                           

 

             Lab Interpretation Abnormal                               



             (test code = 27991-0)                                        



[facility] WITH [id number] 05:48:31





             Test Item    Value        Reference Range Interpretation Comments

 

             WBC (test code =              See_Comment  H             [Automated



             2990-2)                                             message] The sy

stem



                                                                 which generated



                                                                 this result



                                                                 transmitted



                                                                 reference range

:



                                                                 4.20 - 10.70



                                                                 10*3/?L. The



                                                                 reference range

 was



                                                                 not used to



                                                                 interpret this



                                                                 result as



                                                                 normal/abnormal

.

 

             RBC (test code =              See_Comment  H             [Automated



             159-8)                                              message] The sy

stem



                                                                 which generated



                                                                 this result



                                                                 transmitted



                                                                 reference range

:



                                                                 4.26 - 5.52



                                                                 10*6/?L. The



                                                                 reference range

 was



                                                                 not used to



                                                                 interpret this



                                                                 result as



                                                                 normal/abnormal

.

 

             HGB (test code = 17.3 g/dL    12.2-16.4    H            



             718-7)                                              

 

             HCT (test code = 51.4 %       38.4-49.3    H            



             4544-3)                                             

 

             MCV (test code = 87.7 fL      81.7-95.6                 



             787-2)                                              

 

             MCH (test code = 29.5 pg      26.1-32.7                 



             785-6)                                              

 

             MCHC (test code = 33.7 g/dL    31.2-35.0                 



             786-4)                                              

 

             RDW-SD (test code = 49.1 fL      38.5-51.6                 



             24244-2)                                            

 

             RDW-CV (test code = 15.5 %       12.1-15.4    H            



             788-0)                                              

 

             PLT (test code =              See_Comment  H             [Automated



             777-3)                                              message] The sy

stem



                                                                 which generated



                                                                 this result



                                                                 transmitted



                                                                 reference range

:



                                                                 150 - 328 10*3/

?L.



                                                                 The reference r

zhen



                                                                 was not used to



                                                                 interpret this



                                                                 result as



                                                                 normal/abnormal

.

 

             MPV (test code = 10.4 fL      9.8-13.0                  



             70159-8)                                            

 

             NRBC/100 WBC (test              See_Comment                [Automat

ed



             code = [id number])                                        message] 

The system



                                                                 which generated



                                                                 this result



                                                                 transmitted



                                                                 reference range

:



                                                                 0.0 - 10.0 /100



                                                                 WBCs. The refer

ence



                                                                 range was not u

sed



                                                                 to interpret th

is



                                                                 result as



                                                                 normal/abnormal

.

 

             NRBC x10^3 (test code <0.01        See_Comment                [Auto

mated



             = [id number])                                        message] The s

ystem



                                                                 which generated



                                                                 this result



                                                                 transmitted



                                                                 reference range

:



                                                                 10*3/?L. The



                                                                 reference range

 was



                                                                 not used to



                                                                 interpret this



                                                                 result as



                                                                 normal/abnormal

.

 

             GRAN MAT (NEUT) % 64.8 %                                 



             (test code = 770-8)                                        

 

             IMM GRAN % (test code 0.70 %                                 



             = [id number])                                        

 

             LYMPH % (test code = 25.2 %                                 



             736-9)                                              

 

             MONO % (test code = 6.9 %                                  



             5905-5)                                             

 

             EOS % (test code = 1.9 %                                  



             713-8)                                              

 

             BASO % (test code = 0.5 %                                  



             706-2)                                              

 

             GRAN MAT x10^3(ANC) 7.80 10*3/uL 1.99-6.95    H            



             (test code =                                        



             2005237357)                                         

 

             IMM GRAN x10^3 (test 0.08 10*3/uL 0.00-0.06    H            



             code = 8758576023)                                        

 

             LYMPH x10^3 (test code 3.04 10*3/uL 1.09-3.23                 



             = 731-0)                                            

 

             MONO x10^3 (test code 0.83 10*3/uL 0.36-1.02                 



             = 742-7)                                            

 

             EOS x10^3 (test code = 0.23 10*3/uL 0.06-0.53                 



             711-2)                                              

 

             BASO x10^3 (test code 0.06 10*3/uL 0.01-0.09                 



             = 704-7)                                            

 

             Lab Interpretation Abnormal                               



             (test code = 26077-3)                                        



[facility]. METABOLIC PANEL ([id number] 
02:19:08





             Test Item    Value        Reference Range Interpretation Comments

 

             NA (test code = 136 mmol/L   135-145                   



             6111657648)                                         

 

             K (test code = 4.4 mmol/L   3.5-5.0                   



             1881834344)                                         

 

             CL (test code = 99 mmol/L                        



             3744093517)                                         

 

             CO2 TOTAL (test code = 25 mmol/L    23-31                     



             8760762131)                                         

 

             AGAP (test code =              2-16                      



             4345327679)                                         

 

             BUN (test code = 19 mg/dL     7-23                      



             3373743289)                                         

 

             GLUCOSE (test code = 103 mg/dL                        



             0152915153)                                         

 

             CREATININE (test code = 0.70 mg/dL   0.60-1.25                 



             3549983147)                                         

 

             TOTAL BILI (test code = 0.7 mg/dL    0.1-1.1                   



             6293507233)                                         

 

             CALCIUM (test code = 9.2 mg/dL    8.6-10.6                  



             3341380047)                                         

 

             T PROTEIN (test code = 9.4 g/dL     6.3-8.2      H            



             4417306446)                                         

 

             ALBUMIN (test code = 4.8 g/dL     3.5-5.0                   



             7352845117)                                         

 

             ALK PHOS (test code = 146 U/L             H            



             0135267283)                                         

 

             ALTv (test code = 75 U/L       5-50         H            



             1742-6)                                             

 

             AST(SGOT) (test code = 37 U/L       13-40                     



             9075769175)                                         

 

             eGFR (test code =              mL/min/1.73m2              



             9358873389)                                         

 

             MAL (test code = MAL) Association of                           



                          Glomerular Filtration                           



                          Rate (GFR) and Staging                           



                          of Kidney Disease*                           



                          +---------------------                           



                          --+-------------------                           



                          --+-------------------                           



                          ------+| GFR                           



                          (mL/min/1.73 m2) ?|                           



                          With Kidney Damage ?|                           



                          ?Without Kidney                           



                          Damage+---------------                           



                          --------+-------------                           



                          --------+-------------                           



                          ------------+| ?>90 ?                           



                          ? ? ? ? ? ? ? ?|                           



                          ?Stage one ? ? ? ? ?|                           



                          ? Normal ? ? ? ? ? ? ?                           



                          ?+--------------------                           



                          ---+------------------                           



                          ---+------------------                           



                          -------+| ?60-89 ? ? ?                           



                          ? ? ? ? ?| ?Stage two                           



                          ? ? ? ? ?| ? Decreased                           



                          GFR ? ? ? ?                            



                          +---------------------                           



                          --+-------------------                           



                          --+-------------------                           



                          ------+| ?30-59 ? ? ?                           



                          ? ? ? ? ?| ?Stage                           



                          three ? ? ? ?| ? Stage                           



                          three ? ? ? ? ?                           



                          +---------------------                           



                          --+-------------------                           



                          --+-------------------                           



                          ------+| ?15-29 ? ? ?                           



                          ? ? ? ? ?| ?Stage four                           



                          ? ? ? ? | ? Stage four                           



                          ? ? ? ? ?                              



                          ?+--------------------                           



                          ---+------------------                           



                          ---+------------------                           



                          -------+| ?<15 (or                           



                          dialysis) ? ?| ?Stage                           



                          five ? ? ? ? | ? Stage                           



                          five ? ? ? ? ?                           



                          ?+--------------------                           



                          ---+------------------                           



                          ---+------------------                           



                          -------+ *Each stage                           



                          assumes the associated                           



                          GFR level has been in                           



                          effect for at least                           



                          three months. ?Stages                           



                          1 to 5, with or                           



                          without kidney                           



                          disease, indicate                           



                          chronic kidney                           



                          disease. Notes:                           



                          Determination of                           



                          stages one and two                           



                          (with eGFR                             



                          >59mL/min/1.73 m2)                           



                          requires estimation of                           



                          kidney damage for at                           



                          least three months as                           



                          defined by structural                           



                          or functional                           



                          abnormalities of the                           



                          kidney, manifested by                           



                          either:Pathological                           



                          abnormalities or                           



                          Markers of kidney                           



                          damage (including                           



                          abnormalities in the                           



                          composition of the                           



                          blood or urine or                           



                          abnormalities in                           



                          imaging tests).                           

 

             Lab Interpretation Abnormal                               



             (test code = 83118-0)                                        



[facility]





             Test Item    Value        Reference Range Interpretation Comments

 

             LIPASE (test code = 4180455912) 86 U/L       0-220                 

    

 

             Lab Interpretation (test code = Normal                             

    



             18304-5)                                            



[facility] WITH [id number] 01:55:49





             Test Item    Value        Reference Range Interpretation Comments

 

             WBC (test code =              See_Comment  H             [Automated



             0190-2)                                             message] The sy

stem



                                                                 which generated



                                                                 this result



                                                                 transmitted



                                                                 reference range

:



                                                                 4.20 - 10.70



                                                                 10*3/?L. The



                                                                 reference range

 was



                                                                 not used to



                                                                 interpret this



                                                                 result as



                                                                 normal/abnormal

.

 

             RBC (test code =              See_Comment  H             [Automated



             389-8)                                              message] The sy

stem



                                                                 which generated



                                                                 this result



                                                                 transmitted



                                                                 reference range

:



                                                                 4.26 - 5.52



                                                                 10*6/?L. The



                                                                 reference range

 was



                                                                 not used to



                                                                 interpret this



                                                                 result as



                                                                 normal/abnormal

.

 

             HGB (test code = 18.0 g/dL    12.2-16.4    H            



             718-7)                                              

 

             HCT (test code = 53.9 %       38.4-49.3    H            



             4544-3)                                             

 

             MCV (test code = 87.2 fL      81.7-95.6                 



             787-2)                                              

 

             MCH (test code = 29.1 pg      26.1-32.7                 



             785-6)                                              

 

             MCHC (test code = 33.4 g/dL    31.2-35.0                 



             786-4)                                              

 

             RDW-SD (test code = 48.7 fL      38.5-51.6                 



             31725-9)                                            

 

             RDW-CV (test code = 15.4 %       12.1-15.4                 



             788-0)                                              

 

             PLT (test code =              See_Comment  H             [Automated



             777-3)                                              message] The sy

stem



                                                                 which generated



                                                                 this result



                                                                 transmitted



                                                                 reference range

:



                                                                 150 - 328 10*3/

?L.



                                                                 The reference r

zhen



                                                                 was not used to



                                                                 interpret this



                                                                 result as



                                                                 normal/abnormal

.

 

             MPV (test code = 9.9 fL       9.8-13.0                  



             53802-7)                                            

 

             NRBC/100 WBC (test              See_Comment                [Automat

ed



             code = 4394026749)                                        message] 

The system



                                                                 which generated



                                                                 this result



                                                                 transmitted



                                                                 reference range

:



                                                                 0.0 - 10.0 /100



                                                                 WBCs. The refer

ence



                                                                 range was not u

sed



                                                                 to interpret th

is



                                                                 result as



                                                                 normal/abnormal

.

 

             NRBC x10^3 (test code <0.01        See_Comment                [Auto

mated



             = [id number])                                        message] The s

ystem



                                                                 which generated



                                                                 this result



                                                                 transmitted



                                                                 reference range

:



                                                                 10*3/?L. The



                                                                 reference range

 was



                                                                 not used to



                                                                 interpret this



                                                                 result as



                                                                 normal/abnormal

.

 

             GRAN MAT (NEUT) % 69.8 %                                 



             (test code = 770-8)                                        

 

             IMM GRAN % (test code 0.50 %                                 



             = [id number])                                        

 

             LYMPH % (test code = 20.5 %                                 



             736-9)                                              

 

             MONO % (test code = 6.8 %                                  



             5905-5)                                             

 

             EOS % (test code = 1.9 %                                  



             713-8)                                              

 

             BASO % (test code = 0.5 %                                  



             706-2)                                              

 

             GRAN MAT x10^3(ANC) 7.72 10*3/uL 1.99-6.95    H            



             (test code =                                        



             9029459509)                                         

 

             IMM GRAN x10^3 (test 0.05 10*3/uL 0.00-0.06                 



             code = [id number])                                        

 

             LYMPH x10^3 (test code 2.26 10*3/uL 1.09-3.23                 



             = 731-0)                                            

 

             MONO x10^3 (test code 0.75 10*3/uL 0.36-1.02                 



             = 742-7)                                            

 

             EOS x10^3 (test code = 0.21 10*3/uL 0.06-0.53                 



             711-2)                                              

 

             BASO x10^3 (test code 0.06 10*3/uL 0.01-0.09                 



             = 704-7)                                            

 

             Lab Interpretation Abnormal                               



             (test code = 29453-9)                                        



[id number]





             Test Item    Value        Reference    Interpretation Comments



                                       Range                     

 

             D-DIMER (test code =              See_Comment                [Autom

ated



             7135671529)                                         message] The



                                                                 system which



                                                                 generated this



                                                                 result



                                                                 transmitted



                                                                 reference range

:



                                                                 <0.41 ?g/mL



                                                                 (FEU). The



                                                                 reference range



                                                                 was not used to



                                                                 interpret this



                                                                 result as



                                                                 normal/abnormal

.

 

             MAL (test code = This test may be                           



             MAL)         used in conjunction                           



                          with a clinical                           



                          pretest probability                           



                          (PTP) assessment                           



                          model to exclude                           



                          venous                                 



                          thromboembolism                           



                          (VTE) in patients                           



                          suspected of deep                           



                          venous thrombosis                           



                          (DVT) and pulmonary                           



                          embolism (PE)  A                           



                          D-Dimer value less                           



                          than 0.50 ?g/ml                           



                          (FEU) has a negative                           



                          predicative value of                           



                          96 to 100% (95%                           



                          CI)and 97 to 100%                           



                          (95% CI) as an aid                           



                          in the diagnosis of                           



                          deep vein thrombosis                           



                          (DVT) and pulmonary                           



                          embolism when there                           



                          is low or moderate                           



                          pretest probability                           



                          of PE or DVT.                           



                          D-Dimer values are                           



                          expressed in initial                           



                          fibrinogen                             



                          equivalent units                           



                          (FEU)" The assay                           



                          results should be                           



                          used with other                           



                          information,                           



                          including the                           



                          clinical context, in                           



                          forming a diagnosis.                           



                                                                 

 

             Lab Interpretation Normal                                 



             (test code =                                        



             42867-1)                                            



[facility]. METABOLIC PANEL ([id number] 
22:42:48





             Test Item    Value        Reference Range Interpretation Comments

 

             NA (test code = 135 mmol/L   135-145                   



             1848291696)                                         

 

             K (test code = 4.6 mmol/L   3.5-5.0                   



             9901014753)                                         

 

             CL (test code = 102 mmol/L                       



             5846511690)                                         

 

             CO2 TOTAL (test code = 24 mmol/L    23-31                     



             0887476295)                                         

 

             AGAP (test code =              2-16                      



             9941865769)                                         

 

             BUN (test code = 17 mg/dL     7-23                      



             1860259669)                                         

 

             GLUCOSE (test code = 107 mg/dL                        



             2362891564)                                         

 

             CREATININE (test code = 0.60 mg/dL   0.60-1.25                 



             7194537684)                                         

 

             TOTAL BILI (test code = 1.0 mg/dL    0.1-1.1                   



             9773446385)                                         

 

             CALCIUM (test code = 9.4 mg/dL    8.6-10.6                  



             4419927851)                                         

 

             T PROTEIN (test code = 8.6 g/dL     6.3-8.2      H            



             9567319607)                                         

 

             ALBUMIN (test code = 4.6 g/dL     3.5-5.0                   



             9763090930)                                         

 

             ALK PHOS (test code = 159 U/L             H            



             5198929890)                                         

 

             ALTv (test code = 77 U/L       5-50         H            



             1742-6)                                             

 

             AST(SGOT) (test code = 38 U/L       13-40                     



             3885765667)                                         

 

             eGFR (test code =              mL/min/1.73m2              



             9499435351)                                         

 

             MAL (test code = MAL) Association of                           



                          Glomerular Filtration                           



                          Rate (GFR) and Staging                           



                          of Kidney Disease*                           



                          +---------------------                           



                          --+-------------------                           



                          --+-------------------                           



                          ------+| GFR                           



                          (mL/min/1.73 m2) ?|                           



                          With Kidney Damage ?|                           



                          ?Without Kidney                           



                          Damage+---------------                           



                          --------+-------------                           



                          --------+-------------                           



                          ------------+| ?>90 ?                           



                          ? ? ? ? ? ? ? ?|                           



                          ?Stage one ? ? ? ? ?|                           



                          ? Normal ? ? ? ? ? ? ?                           



                          ?+--------------------                           



                          ---+------------------                           



                          ---+------------------                           



                          -------+| ?60-89 ? ? ?                           



                          ? ? ? ? ?| ?Stage two                           



                          ? ? ? ? ?| ? Decreased                           



                          GFR ? ? ? ?                            



                          +---------------------                           



                          --+-------------------                           



                          --+-------------------                           



                          ------+| ?30-59 ? ? ?                           



                          ? ? ? ? ?| ?Stage                           



                          three ? ? ? ?| ? Stage                           



                          three ? ? ? ? ?                           



                          +---------------------                           



                          --+-------------------                           



                          --+-------------------                           



                          ------+| ?15-29 ? ? ?                           



                          ? ? ? ? ?| ?Stage four                           



                          ? ? ? ? | ? Stage four                           



                          ? ? ? ? ?                              



                          ?+--------------------                           



                          ---+------------------                           



                          ---+------------------                           



                          -------+| ?<15 (or                           



                          dialysis) ? ?| ?Stage                           



                          five ? ? ? ? | ? Stage                           



                          five ? ? ? ? ?                           



                          ?+--------------------                           



                          ---+------------------                           



                          ---+------------------                           



                          -------+ *Each stage                           



                          assumes the associated                           



                          GFR level has been in                           



                          effect for at least                           



                          three months. ?Stages                           



                          1 to 5, with or                           



                          without kidney                           



                          disease, indicate                           



                          chronic kidney                           



                          disease. Notes:                           



                          Determination of                           



                          stages one and two                           



                          (with eGFR                             



                          >59mL/min/1.73 m2)                           



                          requires estimation of                           



                          kidney damage for at                           



                          least three months as                           



                          defined by structural                           



                          or functional                           



                          abnormalities of the                           



                          kidney, manifested by                           



                          either:Pathological                           



                          abnormalities or                           



                          Markers of kidney                           



                          damage (including                           



                          abnormalities in the                           



                          composition of the                           



                          blood or urine or                           



                          abnormalities in                           



                          imaging tests).                           

 

             Lab Interpretation Abnormal                               



             (test code = 41704-5)                                        



[facility] WITH DIFF2022-01-15 22:30:27





             Test Item    Value        Reference Range Interpretation Comments

 

             WBC (test code =              See_Comment  H             [Automated



             6690-2)                                             message] The



                                                                 system which



                                                                 generated this



                                                                 result transmit

huma



                                                                 reference range

:



                                                                 4.20 - 10.70



                                                                 10*3/?L. The



                                                                 reference range



                                                                 was not used to



                                                                 interpret this



                                                                 result as



                                                                 normal/abnormal

.

 

             RBC (test code =              See_Comment  H             [Automated



             789-8)                                              message] The



                                                                 system which



                                                                 generated this



                                                                 result transmit

huma



                                                                 reference range

:



                                                                 4.26 - 5.52



                                                                 10*6/?L. The



                                                                 reference range



                                                                 was not used to



                                                                 interpret this



                                                                 result as



                                                                 normal/abnormal

.

 

             HGB (test code = 17.5 g/dL    12.2-16.4    H            



             718-7)                                              

 

             HCT (test code = 51.0 %       38.4-49.3    H            



             4544-3)                                             

 

             MCV (test code = 86.7 fL      81.7-95.6                 



             787-2)                                              

 

             MCH (test code = 29.8 pg      26.1-32.7                 



             785-6)                                              

 

             MCHC (test code = 34.3 g/dL    31.2-35.0                 



             786-4)                                              

 

             RDW-SD (test code = 48.0 fL      38.5-51.6                 



             51619-9)                                            

 

             RDW-CV (test code = 15.1 %       12.1-15.4                 



             788-0)                                              

 

             PLT (test code =              See_Comment  H             [Automated



             777-3)                                              message] The



                                                                 system which



                                                                 generated this



                                                                 result transmit

huma



                                                                 reference range

:



                                                                 150 - 328 10*3/

?L.



                                                                 The reference



                                                                 range was not u

sed



                                                                 to interpret th

is



                                                                 result as



                                                                 normal/abnormal

.

 

             MPV (test code = 10.3 fL      9.8-13.0                  



             15883-7)                                            

 

             NRBC/100 WBC (test              See_Comment                [Automat

ed



             code = 2200914630)                                        message] 

The



                                                                 system which



                                                                 generated this



                                                                 result transmit

huma



                                                                 reference range

:



                                                                 0.0 - 10.0 /100



                                                                 WBCs. The



                                                                 reference range



                                                                 was not used to



                                                                 interpret this



                                                                 result as



                                                                 normal/abnormal

.

 

             NRBC x10^3 (test code <0.01        See_Comment                [Auto

mated



             = [id number])                                        message] The



                                                                 system which



                                                                 generated this



                                                                 result transmit

huma



                                                                 reference range

:



                                                                 10*3/?L. The



                                                                 reference range



                                                                 was not used to



                                                                 interpret this



                                                                 result as



                                                                 normal/abnormal

.

 

             GRAN MAT (NEUT) % 79.9 %                                 



             (test code = 770-8)                                        

 

             IMM GRAN % (test code 0.50 %                                 



             = 7329709045)                                        

 

             LYMPH % (test code = 11.8 %                                 



             736-9)                                              

 

             MONO % (test code = 6.6 %                                  



             5905-5)                                             

 

             EOS % (test code = 0.9 %                                  



             713-8)                                              

 

             BASO % (test code = 0.3 %                                  



             706-2)                                              

 

             GRAN MAT x10^3(ANC) 10.70 10*3/uL 1.99-6.95    H            



             (test code =                                        



             3735249656)                                         

 

             IMM GRAN x10^3 (test 0.07 10*3/uL 0.00-0.06    H            



             code = 7242808347)                                        

 

             LYMPH x10^3 (test code 1.58 10*3/uL 1.09-3.23                 



             = 731-0)                                            

 

             MONO x10^3 (test code 0.89 10*3/uL 0.36-1.02                 



             = 742-7)                                            

 

             EOS x10^3 (test code = 0.12 10*3/uL 0.06-0.53                 



             711-2)                                              

 

             BASO x10^3 (test code 0.04 10*3/uL 0.01-0.09                 



             = 704-7)                                            

 

             Lab Interpretation Abnormal                               



             (test code = 87292-2)                                        



[facility]. METABOLIC PANEL ([id number] 
23:02:15





             Test Item    Value        Reference Range Interpretation Comments

 

             NA (test code = 137 mmol/L   135-145                   



             6846121119)                                         

 

             K (test code = 4.5 mmol/L   3.5-5.0                   



             5154138237)                                         

 

             CL (test code = 109 mmol/L          H            



             8988235880)                                         

 

             CO2 TOTAL (test code = 22 mmol/L    23-31        L            



             9929567871)                                         

 

             AGAP (test code =              2-16                      



             5421052477)                                         

 

             BUN (test code = 7 mg/dL      7-23                      



             3210644923)                                         

 

             GLUCOSE (test code = 87 mg/dL                         



             3837094868)                                         

 

             CREATININE (test code = 0.61 mg/dL   0.60-1.25                 



             4088685754)                                         

 

             TOTAL BILI (test code = 0.5 mg/dL    0.1-1.1                   



             2071510166)                                         

 

             CALCIUM (test code = 9.0 mg/dL    8.6-10.6                  



             5354700831)                                         

 

             T PROTEIN (test code = 6.9 g/dL     6.3-8.2                   



             0891785109)                                         

 

             ALBUMIN (test code = 3.5 g/dL     3.5-5.0                   



             1555762121)                                         

 

             ALK PHOS (test code = 112 U/L                          



             2846894706)                                         

 

             ALTv (test code = 35 U/L       5-50                      



             1742-6)                                             

 

             AST(SGOT) (test code = 21 U/L       13-40                     



             7536109592)                                         

 

             eGFR (test code =              mL/min/1.73m2              



             7682687155)                                         

 

             MAL (test code = MAL) Association of                           



                          Glomerular Filtration                           



                          Rate (GFR) and Staging                           



                          of Kidney Disease*                           



                          +---------------------                           



                          --+-------------------                           



                          --+-------------------                           



                          ------+| GFR                           



                          (mL/min/1.73 m2) ?|                           



                          With Kidney Damage ?|                           



                          ?Without Kidney                           



                          Damage+---------------                           



                          --------+-------------                           



                          --------+-------------                           



                          ------------+| ?>90 ?                           



                          ? ? ? ? ? ? ? ?|                           



                          ?Stage one ? ? ? ? ?|                           



                          ? Normal ? ? ? ? ? ? ?                           



                          ?+--------------------                           



                          ---+------------------                           



                          ---+------------------                           



                          -------+| ?60-89 ? ? ?                           



                          ? ? ? ? ?| ?Stage two                           



                          ? ? ? ? ?| ? Decreased                           



                          GFR ? ? ? ?                            



                          +---------------------                           



                          --+-------------------                           



                          --+-------------------                           



                          ------+| ?30-59 ? ? ?                           



                          ? ? ? ? ?| ?Stage                           



                          three ? ? ? ?| ? Stage                           



                          three ? ? ? ? ?                           



                          +---------------------                           



                          --+-------------------                           



                          --+-------------------                           



                          ------+| ?15-29 ? ? ?                           



                          ? ? ? ? ?| ?Stage four                           



                          ? ? ? ? | ? Stage four                           



                          ? ? ? ? ?                              



                          ?+--------------------                           



                          ---+------------------                           



                          ---+------------------                           



                          -------+| ?<15 (or                           



                          dialysis) ? ?| ?Stage                           



                          five ? ? ? ? | ? Stage                           



                          five ? ? ? ? ?                           



                          ?+--------------------                           



                          ---+------------------                           



                          ---+------------------                           



                          -------+ *Each stage                           



                          assumes the associated                           



                          GFR level has been in                           



                          effect for at least                           



                          three months. ?Stages                           



                          1 to 5, with or                           



                          without kidney                           



                          disease, indicate                           



                          chronic kidney                           



                          disease. Notes:                           



                          Determination of                           



                          stages one and two                           



                          (with eGFR                             



                          >59mL/min/1.73 m2)                           



                          requires estimation of                           



                          kidney damage for at                           



                          least three months as                           



                          defined by structural                           



                          or functional                           



                          abnormalities of the                           



                          kidney, manifested by                           



                          either:Pathological                           



                          abnormalities or                           



                          Markers of kidney                           



                          damage (including                           



                          abnormalities in the                           



                          composition of the                           



                          blood or urine or                           



                          abnormalities in                           



                          imaging tests).                           

 

             Lab Interpretation Abnormal                               



             (test code = 63955-8)                                        



[facility] WITH [id number] 22:51:57





             Test Item    Value        Reference Range Interpretation Comments

 

             WBC (test code =              See_Comment  H             [Automated



             6690-2)                                             message] The sy

stem



                                                                 which generated



                                                                 this result



                                                                 transmitted



                                                                 reference range

:



                                                                 4.20 - 10.70



                                                                 10*3/?L. The



                                                                 reference range

 was



                                                                 not used to



                                                                 interpret this



                                                                 result as



                                                                 normal/abnormal

.

 

             RBC (test code =              See_Comment                [Automated



             789-8)                                              message] The sy

stem



                                                                 which generated



                                                                 this result



                                                                 transmitted



                                                                 reference range

:



                                                                 4.26 - 5.52



                                                                 10*6/?L. The



                                                                 reference range

 was



                                                                 not used to



                                                                 interpret this



                                                                 result as



                                                                 normal/abnormal

.

 

             HGB (test code = 14.7 g/dL    12.2-16.4                 



             718-7)                                              

 

             HCT (test code = 43.7 %       38.4-49.3                 



             4544-3)                                             

 

             MCV (test code = 85.9 fL      81.7-95.6                 



             787-2)                                              

 

             MCH (test code = 28.9 pg      26.1-32.7                 



             785-6)                                              

 

             MCHC (test code = 33.6 g/dL    31.2-35.0                 



             786-4)                                              

 

             RDW-SD (test code = 42.4 fL      38.5-51.6                 



             06903-9)                                            

 

             RDW-CV (test code = 13.6 %       12.1-15.4                 



             788-0)                                              

 

             PLT (test code =              See_Comment  H             [Automated



             777-3)                                              message] The sy

stem



                                                                 which generated



                                                                 this result



                                                                 transmitted



                                                                 reference range

:



                                                                 150 - 328 10*3/

?L.



                                                                 The reference r

zhen



                                                                 was not used to



                                                                 interpret this



                                                                 result as



                                                                 normal/abnormal

.

 

             MPV (test code = 9.4 fL       9.8-13.0     L            



             55774-5)                                            

 

             NRBC/100 WBC (test              See_Comment                [Automat

ed



             code = 5287525325)                                        message] 

The system



                                                                 which generated



                                                                 this result



                                                                 transmitted



                                                                 reference range

:



                                                                 0.0 - 10.0 /100



                                                                 WBCs. The refer

ence



                                                                 range was not u

sed



                                                                 to interpret th

is



                                                                 result as



                                                                 normal/abnormal

.

 

             NRBC x10^3 (test code <0.01        See_Comment                [Auto

mated



             = 1580546483)                                        message] The s

ystem



                                                                 which generated



                                                                 this result



                                                                 transmitted



                                                                 reference range

:



                                                                 10*3/?L. The



                                                                 reference range

 was



                                                                 not used to



                                                                 interpret this



                                                                 result as



                                                                 normal/abnormal

.

 

             GRAN MAT (NEUT) % 76.4 %                                 



             (test code = 770-8)                                        

 

             IMM GRAN % (test code 0.40 %                                 



             = 7523441224)                                        

 

             LYMPH % (test code = 16.3 %                                 



             736-9)                                              

 

             MONO % (test code = 5.6 %                                  



             5905-5)                                             

 

             EOS % (test code = 1.0 %                                  



             713-8)                                              

 

             BASO % (test code = 0.3 %                                  



             706-2)                                              

 

             GRAN MAT x10^3(ANC) 8.81 10*3/uL 1.99-6.95    H            



             (test code =                                        



             0905104240)                                         

 

             IMM GRAN x10^3 (test 0.05 10*3/uL 0.00-0.06                 



             code = 4247580960)                                        

 

             LYMPH x10^3 (test code 1.88 10*3/uL 1.09-3.23                 



             = 731-0)                                            

 

             MONO x10^3 (test code 0.64 10*3/uL 0.36-1.02                 



             = 742-7)                                            

 

             EOS x10^3 (test code = 0.12 10*3/uL 0.06-0.53                 



             711-2)                                              

 

             BASO x10^3 (test code 0.03 10*3/uL 0.01-0.09                 



             = 704-7)                                            

 

             Lab Interpretation Abnormal                               



             (test code = 38358-5)                                        



[facility] 
01:01:33





             Test Item    Value        Reference Range Interpretation Comments

 

             SARS-CoV-2 Rapid ID NOW Not Detected Not Detected              



             (test code = 81591-9)                                        

 

             MAL (test code = MAL) ID NOW COVID-19 Assay                        

   



                          is an isothermal                           



                          nucleic acid                           



                          amplification test                           



                          intended for the                           



                          qualitative detection                           



                          of nucleic acid from                           



                          SARS-CoV-2 viral RNA                           



                          in nasopharyngeal (NP)                           



                          specimens. It is used                           



                          under Emergency Use                           



                          Authorization (EUA) by                           



                          FDA. The limit of                           



                          detection (LOD) of the                           



                          assay is 125 Genome                           



                          Equivalents/mL. A                           



                          positive result is                           



                          indicative of the                           



                          presence of SARS-CoV-2                           



                          RNA. ?Clinical                           



                          correlation with                           



                          patient history and                           



                          other diagnostic                           



                          information is                           



                          necessary to determine                           



                          patient infection                           



                          status. A negative                           



                          (Not Detected) result                           



                          does not preclude                           



                          SARS-CoV-2 infection.                           



                          In patients with                           



                          clinical symptoms and                           



                          other tests that are                           



                          consistent with                           



                          SARS-CoV-2 infection,                           



                          negative results                           



                          should be treated as                           



                          presumptive negative                           



                          and a new specimen                           



                          should be tested with                           



                          alternative PCR                           



                          molecular test.                           



                          Invalid: Please                           



                          collect a new specimen                           



                          for repeat patient                           



                          testing if clinically                           



                          indicated.                             

 

             Lab Interpretation Normal                                 



             (test code = 96869-5)                                        



[facility]. METABOLIC PANEL ([id number])2021-05-10 
01:00:33





             Test Item    Value        Reference Range Interpretation Comments

 

             NA (test code = 140 mmol/L   135-145                   



             [id number])                                         

 

             K (test code = 4.4 mmol/L   3.5-5.0                   



             4362033241)                                         

 

             CL (test code = 103 mmol/L                       



             9538087714)                                         

 

             CO2 TOTAL (test code = 28 mmol/L    23-31                     



             4664849967)                                         

 

             AGAP (test code =              2-16                      



             6189278577)                                         

 

             BUN (test code = 15 mg/dL     7-23                      



             1713462935)                                         

 

             GLUCOSE (test code = 85 mg/dL                         



             4059802579)                                         

 

             CREATININE (test code = 0.60 mg/dL   0.60-1.25                 



             2382896512)                                         

 

             TOTAL BILI (test code = 1.1 mg/dL    0.1-1.1                   



             2475355911)                                         

 

             CALCIUM (test code = 9.0 mg/dL    8.6-10.6                  



             7423606661)                                         

 

             T PROTEIN (test code = 7.8 g/dL     6.3-8.2                   



             0030825281)                                         

 

             ALBUMIN (test code = 4.0 g/dL     3.5-5.0                   



             5752874515)                                         

 

             ALK PHOS (test code = 166 U/L             H            



             3841044475)                                         

 

             ALTv (test code = 42 U/L       5-50                      



             1742-6)                                             

 

             AST(SGOT) (test code = 32 U/L       13-40                     



             7366281315)                                         

 

             eGFR (test code =              mL/min/1.73m2              



             0108512500)                                         

 

             MAL (test code = MAL) Association of                           



                          Glomerular Filtration                           



                          Rate (GFR) and Staging                           



                          of Kidney Disease*                           



                          +---------------------                           



                          --+-------------------                           



                          --+-------------------                           



                          ------+| GFR                           



                          (mL/min/1.73 m2) ?|                           



                          With Kidney Damage ?|                           



                          ?Without Kidney                           



                          Damage+---------------                           



                          --------+-------------                           



                          --------+-------------                           



                          ------------+| ?>90 ?                           



                          ? ? ? ? ? ? ? ?|                           



                          ?Stage one ? ? ? ? ?|                           



                          ? Normal ? ? ? ? ? ? ?                           



                          ?+--------------------                           



                          ---+------------------                           



                          ---+------------------                           



                          -------+| ?60-89 ? ? ?                           



                          ? ? ? ? ?| ?Stage two                           



                          ? ? ? ? ?| ? Decreased                           



                          GFR ? ? ? ?                            



                          +---------------------                           



                          --+-------------------                           



                          --+-------------------                           



                          ------+| ?30-59 ? ? ?                           



                          ? ? ? ? ?| ?Stage                           



                          three ? ? ? ?| ? Stage                           



                          three ? ? ? ? ?                           



                          +---------------------                           



                          --+-------------------                           



                          --+-------------------                           



                          ------+| ?15-29 ? ? ?                           



                          ? ? ? ? ?| ?Stage four                           



                          ? ? ? ? | ? Stage four                           



                          ? ? ? ? ?                              



                          ?+--------------------                           



                          ---+------------------                           



                          ---+------------------                           



                          -------+| ?<15 (or                           



                          dialysis) ? ?| ?Stage                           



                          five ? ? ? ? | ? Stage                           



                          five ? ? ? ? ?                           



                          ?+--------------------                           



                          ---+------------------                           



                          ---+------------------                           



                          -------+ *Each stage                           



                          assumes the associated                           



                          GFR level has been in                           



                          effect for at least                           



                          three months. ?Stages                           



                          1 to 5, with or                           



                          without kidney                           



                          disease, indicate                           



                          chronic kidney                           



                          disease. Notes:                           



                          Determination of                           



                          stages one and two                           



                          (with eGFR                             



                          >59mL/min/1.73 m2)                           



                          requires estimation of                           



                          kidney damage for at                           



                          least three months as                           



                          defined by structural                           



                          or functional                           



                          abnormalities of the                           



                          kidney, manifested by                           



                          either:Pathological                           



                          abnormalities or                           



                          Markers of kidney                           



                          damage (including                           



                          abnormalities in the                           



                          composition of the                           



                          blood or urine or                           



                          abnormalities in                           



                          imaging tests).                           

 

             Lab Interpretation Abnormal                               



             (test code = 79453-4)                                        



[facility]





             Test Item    Value        Reference Range Interpretation Comments

 

             LIPASE (test code = 5123382907) 57 U/L       0-220                 

    

 

             Lab Interpretation (test code = Normal                             

    



             02003-6)                                            



[facility]





             Test Item    Value        Reference Range Interpretation Comments

 

             APPEARANCE (test code = Turbid       Clear        A            



             1784556054)                                         

 

             COLOR (test code = Yellow       Yellow                    



             0408960050)                                         

 

             PH (test code =              4.8-8.0                   



             2727136876)                                         

 

             SP GRAVITY (test code =              1.003-1.030               



             9535108954)                                         

 

             GLU U QUAL (test code = Normal       Normal                    



             2448259776)                                         

 

             BLOOD (test code = 1+           Negative     A            



             2940554462)                                         

 

             KETONES (test code = 20 mg/dL     Negative     A            



             6819086220)                                         

 

             PROTEIN (test code = 100 mg/dL    Negative     A            



             2887-8)                                             

 

             UROBILIN (test code = 2.0 mg/dL    Normal       A            



             4041352032)                                         

 

             BILIRUBIN (test code = Negative     Negative                  



             9601808266)                                         

 

             NITRITE (test code = Positive     Negative     A            



             5447147764)                                         

 

             LEUK FRANCE (test code = 250/uL       Negative     A            



             3703298847)                                         

 

             RBC/HPF (test code =              See_Comment  H             [Autom

ated message]



             9266563902)                                         The system [employer]



                                                                 generated this



                                                                 result transmit

huma



                                                                 reference range

: 0 -



                                                                 3 HPF. The refe

rence



                                                                 range was not u

sed



                                                                 to interpret th

is



                                                                 result as



                                                                 normal/abnormal

.

 

             WBC/HPF (test code = >182         See_Comment  H             [Autom

ated message]



             6402999411)                                         The system [employer]



                                                                 generated this



                                                                 result transmit

huma



                                                                 reference range

: 0 -



                                                                 5 HPF. The refe

rence



                                                                 range was not u

sed



                                                                 to interpret th

is



                                                                 result as



                                                                 normal/abnormal

.

 

             BACTERIA (test code = Many         Negative     A            



             1706193425)                                         

 

             MUCOUS (test code = Moderate     Negative LPF A            



             8811283085)                                         

 

             WBC CLUMPS (test code =              See_Comment  H             [Au

tomated message]



             1849407652)                                         The system [employer]



                                                                 generated this



                                                                 result transmit

huma



                                                                 reference range

: <=1



                                                                 HPF. The refere

nce



                                                                 range was not u

sed



                                                                 to interpret th

is



                                                                 result as



                                                                 normal/abnormal

.

 

             Lab Interpretation (test Abnormal                               



             code = 82395-4)                                        



[facility] WITH DIFF2021-05-10 00:46:14





             Test Item    Value        Reference Range Interpretation Comments

 

             WBC (test code =              See_Comment                [Automated



             6690-2)                                             message] The sy

stem



                                                                 which generated



                                                                 this result



                                                                 transmitted



                                                                 reference range

:



                                                                 4.20 - 10.70



                                                                 10*3/?L. The



                                                                 reference range

 was



                                                                 not used to



                                                                 interpret this



                                                                 result as



                                                                 normal/abnormal

.

 

             RBC (test code =              See_Comment                [Automated



             789-8)                                              message] The sy

stem



                                                                 which generated



                                                                 this result



                                                                 transmitted



                                                                 reference range

:



                                                                 4.26 - 5.52



                                                                 10*6/?L. The



                                                                 reference range

 was



                                                                 not used to



                                                                 interpret this



                                                                 result as



                                                                 normal/abnormal

.

 

             HGB (test code = 15.9 g/dL    12.2-16.4                 



             718-7)                                              

 

             HCT (test code = 47.1 %       38.4-49.3                 



             4544-3)                                             

 

             MCV (test code = 86.6 fL      81.7-95.6                 



             787-2)                                              

 

             MCH (test code = 29.2 pg      26.1-32.7                 



             785-6)                                              

 

             MCHC (test code = 33.8 g/dL    31.2-35.0                 



             786-4)                                              

 

             RDW-SD (test code = 47.6 fL      38.5-51.6                 



             51627-4)                                            

 

             RDW-CV (test code = 15.2 %       12.1-15.4                 



             788-0)                                              

 

             PLT (test code =              See_Comment  H             [Automated



             777-3)                                              message] The sy

stem



                                                                 which generated



                                                                 this result



                                                                 transmitted



                                                                 reference range

:



                                                                 150 - 328 10*3/

?L.



                                                                 The reference r

zhen



                                                                 was not used to



                                                                 interpret this



                                                                 result as



                                                                 normal/abnormal

.

 

             MPV (test code = 9.4 fL       9.8-13.0     L            



             10701-8)                                            

 

             NRBC/100 WBC (test              See_Comment                [Automat

ed



             code = 6459738057)                                        message] 

The system



                                                                 which generated



                                                                 this result



                                                                 transmitted



                                                                 reference range

:



                                                                 0.0 - 10.0 /100



                                                                 WBCs. The refer

ence



                                                                 range was not u

sed



                                                                 to interpret th

is



                                                                 result as



                                                                 normal/abnormal

.

 

             NRBC x10^3 (test code <0.01        See_Comment                [Auto

mated



             = [id number])                                        message] The s

ystem



                                                                 which generated



                                                                 this result



                                                                 transmitted



                                                                 reference range

:



                                                                 10*3/?L. The



                                                                 reference range

 was



                                                                 not used to



                                                                 interpret this



                                                                 result as



                                                                 normal/abnormal

.

 

             GRAN MAT (NEUT) % 61.3 %                                 



             (test code = 770-8)                                        

 

             IMM GRAN % (test code 0.70 %                                 



             = 3717249858)                                        

 

             LYMPH % (test code = 26.4 %                                 



             736-9)                                              

 

             MONO % (test code = 9.9 %                                  



             5905-5)                                             

 

             EOS % (test code = 1.3 %                                  



             713-8)                                              

 

             BASO % (test code = 0.4 %                                  



             706-2)                                              

 

             GRAN MAT x10^3(ANC) 6.39 10*3/uL 1.99-6.95                 



             (test code =                                        



             8247874281)                                         

 

             IMM GRAN x10^3 (test 0.07 10*3/uL 0.00-0.06    H            



             code = 6307045788)                                        

 

             LYMPH x10^3 (test code 2.75 10*3/uL 1.09-3.23                 



             = 731-0)                                            

 

             MONO x10^3 (test code 1.03 10*3/uL 0.36-1.02    H            



             = 742-7)                                            

 

             EOS x10^3 (test code = 0.14 10*3/uL 0.06-0.53                 



             711-2)                                              

 

             BASO x10^3 (test code 0.04 10*3/uL 0.01-0.09                 



             = 704-7)                                            

 

             Lab Interpretation Abnormal                               



             (test code = 40736-2)                                        



[facility] [id number] 13:03:00





             Test Item    Value        Reference Range Interpretation Comments

 

             POC-GLUCOSE METER 140 mg/dL           H            : TESTED A

T [facility] [address]



             (BEAKER) (test code =                                        [place],



             1538)                                               [address]:



                                                                 /Techni

christina ID



                                                                 = [id number] for ANANT CATES



POCT-GLUCOSE [id number] 09:15:00





             Test Item    Value        Reference Range Interpretation Comments

 

             POC-GLUCOSE METER 142 mg/dL           H            : TESTED A

T [facility] [address]



             (BEAKER) (test code =                                        [facility],



             1538)                                               [address]:



                                                                 /Techni

christina ID



                                                                 = [id number] for ANANT CATES



POCT-GLUCOSE METER2020-01-15 20:56:00





             Test Item    Value        Reference Range Interpretation Comments

 

             POC-GLUCOSE METER 134 mg/dL           H            : TESTED A

T [facility] [address]



             (BEAKER) (test code =                                        [facility],



             1538)                                               [address]:



                                                                 /Techni

christina ID



                                                                 = [id number] for SHERRILL GUARDADO



POCT-GLUCOSE METER2020-01-15 16:46:00





             Test Item    Value        Reference Range Interpretation Comments

 

             POC-GLUCOSE METER 91 mg/dL                         : TESTED A

T [facility] [address]



             (BEAKER) (test code =                                        [facility],



             1538)                                               [address]:



                                                                 /Techni

christina ID =



                                                                 [id number] for ANANT HAMILTON



POCT-GLUCOSE METER2020-01-15 12:19:00





             Test Item    Value        Reference Range Interpretation Comments

 

             POC-GLUCOSE METER 237 mg/dL           H            : TESTED A

T [facility] [address]



             (BEAKER) (test code =                                        [facility],



             1538)                                               [address]:



                                                                 /Techni

christina ID



                                                                 = [id number] for ANANT CATES



MR, EXTREMITY, LOWER, WITHOUT CONTRAST, RIGHT2020-01-15 09:12:00FINAL REPORT 
PATIENT ID:   [id number] MRI of the right and left hindfoot without intravenous 
contrast History: Osteomyelitis, diabetic foot Comparison: Radiograph dated 
2020 Technique: Multiplanar multisequence MRI of the right and left 
hindfoot was performed without intravenous contrast using the hindfoot 
osteomyelitis protocol Findings: Right foot: Marked T1 and T2 hypointense soft 
tissue thickening is noted at the medial aspect of the right heel, likely to 
reflect scar tissue. There is also mild subcutaneous edema at the lateral aspect
of the mid foot and forefoot, incompletely imaged. No discrete drainable fluid 
collection is identified. There is no marrow signal abnormality to suggest 
osteomyelitis. There is a small nonspecific joint effusion at the ankle and 
subtalar joint. Scattered degenerative changes are noted. There is marked fatty 
atrophy of the distal leg and foot musculature. Severe flexor hallucis longus 
tendinopathy. No tenosynovitis. Left foot: There is a large area ofsoft tissue 
defect and granulation tissue at the posteromedial aspect of the heel, reaching 
the calcaneus, but there is no drainable fluid collection. Deformity is noted in
the hindfoot, and the forefoot appears adducted. No acute fracture. No marrow 
signal abnormality is identified to indicate acute osteomyelitis. There is no 
significant joint effusion. There is severe atrophy of the foot and distalleg 
musculature. No significant tenosynovitis identified. IMPRESSION: 
Granulation/scar tissue at themedial aspect of the heel bilaterally. No MR 
evidence of osteomyelitis. Severe muscle atrophy. Signed: Jorge Cornejo 
Verified Date/Time:  01/15/2020 09:12:01 Reading Location: [address] Ortho 
Consult Reading Room        Electronically signed by: JOREG CORNEJO M.D. on 
01/15/2020 09:12 AMMR, EXTREMITY, LOWER, WITHOUT CONTRAST, LEFT2020-01-15 
09:12:00FINAL REPORT PATIENT ID:   [id number] MRI of the right and left hindfoot 
without intravenous contrast History: Osteomyelitis, diabetic foot Comparison: 
Radiograph dated 2020 Technique: Multiplanar multisequence MRI of the
right and left hindfoot was performed without intravenous contrast using the 
hindfoot osteomyelitis protocol Findings: Right foot: Marked T1 and T2 
hypointense soft tissue thickening is noted at the medial aspect of the right 
heel, likely to reflect scar tissue. There is also mild subcutaneous edema at 
the lateral aspect of the mid foot and forefoot, incompletely imaged. No 
discrete drainable fluid collection is identified. There is no marrow signal 
abnormality to suggest osteomyelitis. There is a small nonspecific joint 
effusion at the ankle and subtalar joint. Scattered degenerative changes are 
noted. There is marked fatty atrophy of the distal leg and foot musculature. 
Severe flexor hallucis longus tendinopathy. No tenosynovitis. Left foot: There 
is a large area ofsoft tissue defect and granulation tissue at the posteromedial
aspect of the heel, reaching the calcaneus, but there is no drainable fluid 
collection. Deformity is noted in the hindfoot, and the forefoot appears 
adducted. No acute fracture. No marrow signal abnormality is identified to 
indicate acute osteomyelitis. There is no significant joint effusion. There is 
severe atrophy of the foot and distalleg musculature. No significant 
tenosynovitis identified. IMPRESSION: Granulation/scar tissue at themedial 
aspect of the heel bilaterally. No MR evidence of osteomyelitis. Severe muscle 
atrophy. Signed: Jorge Cornejo Verified Date/Time:  01/15/2020 09:12:01 
Reading Location: [address] Ortho Consult Reading Room        Electronically 
signed by: JORGE CORNEJO M.D. on 01/15/2020 09:12 AMPOCT-GLUCOSE METER2020-01-15 
08:47:00





             Test Item    Value        Reference Range Interpretation Comments

 

             POC-GLUCOSE METER 264 mg/dL           H            : TESTED A

T [facility] [address]



             (BEAKER) (test code =                                        [facility],



             153)                                               [id number]:



                                                                 /Techni

christina ID



                                                                 = [id number] for AMAYA ACOSTA



                                                                 ANANT



ISLET CELL AB SCR2020-01-15 07:43:00





             Test Item    Value        Reference Range Interpretation Comments

 

             ISLET CELL AB Refer to individual                           



             AUTOVERIFICATION (test Islet Cell Ab                           



             code = 2556) and/or Islet Cell                           



                          Ab Titer results.                           



POCT-GLUCOSE [id number] 21:27:00





             Test Item    Value        Reference Range Interpretation Comments

 

             POC-GLUCOSE METER 130 mg/dL           H            : TESTED A

T [facility] [address]



             (BEAKER) (test code =                                        [facility],



             153)                                               [address]:



                                                                 /Techni

christina ID



                                                                 = [id number] for KRANTHI PIERRE



BLOOD [id number] 13:00:00





             Test Item    Value        Reference Range Interpretation Comments

 

             CULTURE (BEAKER) (test No growth in 5 days                         

  



             code = 1095)                                        



BLOOD [id number] 13:00:00





             Test Item    Value        Reference Range Interpretation Comments

 

             CULTURE (BEAKER) (test No growth in 5 days                         

  



             code = 1095)                                        



POCT-GLUCOSE [id number] 12:57:00





             Test Item    Value        Reference Range Interpretation Comments

 

             POC-GLUCOSE METER 138 mg/dL           H            : TESTED A

T [facility] [address]



             (BEAKER) (test code =                                        [facility],



             153)                                               [address]:



                                                                 /Techni

christina ID



                                                                 = [id number] for CAROL DUPONT



                                                                 BARBARA



POCT-GLUCOSE [id number] 08:43:00





             Test Item    Value        Reference Range Interpretation Comments

 

             POC-GLUCOSE METER 226 mg/dL           H            : TESTED A

[facility]



             ([facility]) (test code =                                        [facility],



             153)                                               [address]:



                                                                 /Techni

christina ID



                                                                 = [id number] for [place]

LLIS,



                                                                 BARBARA



POCT-GLUCOSE [id number] 21:11:00





             Test Item    Value        Reference Range Interpretation Comments

 

             POC-GLUCOSE METER 254 mg/dL           H            : Notified

 RN/MD:



             ([facility]) (test code =                                        TESTED

 AT [facility] [address]



             1538)                                               [facility],



                                                                 [address]:



                                                                 /Techni

christina ID



                                                                 = [id number] for KRANTHI PIERRE



POCT-GLUCOSE [id number] 21:02:00





             Test Item    Value        Reference Range Interpretation Comments

 

             POC-GLUCOSE METER 177 mg/dL           H            : TESTED A

[facility]



             ([facility]) (test code =                                        [facility],



             153)                                               [address]:



                                                                 /Techni

christina ID



                                                                 = [id number] for [place]

LLIS,



                                                                 BARBARA



POCT-GLUCOSE [id number] 12:31:00





             Test Item    Value        Reference Range Interpretation Comments

 

             POC-GLUCOSE METER 215 mg/dL           H            : TESTED A

[facility]



             ([facility]) (test code =                                        [facility],



             153)                                               [address]:



                                                                 /Techni

christina ID



                                                                 = [id number] for [place]

LLIS,



                                                                 BARBARA



WOUND CULTURE + GRAM [id number] 10:10:00





             Test Item    Value        Reference    Interpretation Comments



                                       Range                     

 

             CULTURE ([facility]) PROTEUS MIRABILIS              A            1+ Pro

teus



             (test code = 1095)                                        mirabilis

 

             Amikacin (test code =                           S            



             1)                                                  

 

             Ampicillin +                           S            



             Sulbactam (test code                                        



             = 6)                                                

 

             Aztreonam (test code                           S            



             = 32)                                               

 

             Cefepime (test code =                           S            



             51)                                                 

 

             Cefoxitin (test code                           R            



             = 68)                                               

 

             Ceftazidime (test                           S            



             code = 27)                                          

 

             Ceftriaxone (test                           S            



             code = 52)                                          

 

             Ertapenem (test code                           S            



             = 38)                                               

 

             Gentamicin (test code                           S            



             = 18)                                               

 

             Levofloxacin (test                           R            



             code = 22)                                          

 

             Meropenem (test code                           S            



             = 34)                                               

 

             Piperacillin +                           S            



             Tazobactam (test code                                        



             = 29)                                               

 

             Tetracycline (test                           R            



             code = 2)                                           

 

             Tobramycin (test code                           S            



             = 25)                                               

 

             Trimethoprim +                           R            



             Sulfamethoxazole                                        



             (test code = 47)                                        

 

             CULTURE (BEAKER) METHICILLIN               A            1+ Methicil

bryn



             (test code = 1095) RESISTANT                              resistant



                          STAPHYLOCOCCUS                           Staphylococcu

s



                          AUREUS                                 aureus

 

             Clindamycin (test                           R            



             code = 10)                                          

 

             Erythromycin (test                           R            



             code = 4)                                           

 

             Linezolid (test code                           S            



             = 40)                                               

 

             Nitrofurantoin (test                           S            



             code = 23)                                          

 

             Oxacillin (test code                           R            



             = 14)                                               

 

             Rifampin (test code =                           S            



             43)                                                 

 

             Tetracycline (test                           S            



             code = 2)                                           

 

             Trimethoprim +                           S            



             Sulfamethoxazole                                        



             (test code = 47)                                        

 

             Vancomycin (test code                           S            



             = 13)                                               

 

             CULTURE (BEAKER) ESCHERICHIA COLI              A            <1+ Esc

herichia



             (test code = 1095)                                        coliESBL 

Positive

 

             Amikacin (test code =                           S            



             1)                                                  

 

             Ampicillin +                           R            



             Sulbactam (test code                                        



             = 6)                                                

 

             Aztreonam (test code                           R            



             = 32)                                               

 

             Cefepime (test code =                           R            



             51)                                                 

 

             Cefoxitin (test code                           R            



             = 68)                                               

 

             Ceftazidime (test                           R            



             code = 27)                                          

 

             Ceftriaxone (test                           R            



             code = 52)                                          

 

             Ertapenem (test code                           S            



             = 38)                                               

 

             Gentamicin (test code                           S            



             = 18)                                               

 

             Levofloxacin (test                           R            



             code = 22)                                          

 

             Meropenem (test code                           S            



             = 34)                                               

 

             Nitrofurantoin (test                           S            



             code = 23)                                          

 

             Piperacillin +                           R            



             Tazobactam (test code                                        



             = 29)                                               

 

             Tetracycline (test                           S            



             code = 2)                                           

 

             Tobramycin (test code                           S            



             = 25)                                               

 

             Trimethoprim +                           R            



             Sulfamethoxazole                                        



             (test code = 47)                                        

 

             CULTURE (BEAKER)                           A            Beta-hemoly

tic



             (test code = 1095)                                        streptoco

ccus



                                                                 group B, by



                                                                 serological



                                                                 grouping

 

             GRAM STAIN RESULT 3+ WBCs                                



             (BEAKER) (test code =                                        



             1123)                                               

 

             GRAM STAIN RESULT 1+ gram negative                           



             (BEAKER) (test code = rods                                   



             341907)                                             

 

             GRAM STAIN RESULT 2+ gram positive                           



             (BEAKER) (test code = rods                                   



             941336)                                             

 

             GRAM STAIN RESULT <1+ gram negative                           



             (BEAKER) (test code = coccobacilli                           



             864025)                                             

 

             GRAM STAIN RESULT 2+ gram positive                           



             (BEAKER) (test code = cocci in pairs                           



             [id number])                                             



POCT-GLUCOSE [id number] 08:52:00





             Test Item    Value        Reference Range Interpretation Comments

 

             POC-GLUCOSE METER 209 mg/dL           H            : TESTED A

T [facility] [address]



             (BEAKER) (test code =                                        [facility],



             153)                                               [address]:



                                                                 /Techni

christina ID



                                                                 = [id number] for ADRIEL HARKINSE



POCT-GLUCOSE [id number] 21:26:00





             Test Item    Value        Reference Range Interpretation Comments

 

             POC-GLUCOSE METER 255 mg/dL           H            : TESTED A

T [facility] [address]



             (BEAKER) (test code =                                        [facility],



             153)                                               [address]:



                                                                 /Techni

christina ID



                                                                 = [id number] for CR

ISWELL,



                                                                 TIA



POCT-GLUCOSE [id number] 17:34:00





             Test Item    Value        Reference Range Interpretation Comments

 

             POC-GLUCOSE METER 227 mg/dL           H            : TESTED A

T [facility] [address]



             (BEAKER) (test code =                                        [facility],



             [facility])                                               [address]:



                                                                 /Techni

christina ID



                                                                 = [id number] for WASSERMAN

NNY,



                                                                 NAKITA



POCT-GLUCOSE [id number] 11:28:00





             Test Item    Value        Reference Range Interpretation Comments

 

             POC-GLUCOSE METER 282 mg/dL           H            : TESTED A

T [facility] [address]



             (BEAKER) (test code =                                        [facility],



             153)                                               [address]:



                                                                 /Techni

christina ID



                                                                 = [id number] for WASSERMAN

NNY,



                                                                 NAKITA



POCT-GLUCOSE [id number] 08:19:00





             Test Item    Value        Reference Range Interpretation Comments

 

             POC-GLUCOSE METER 329 mg/dL           H            : TESTED A

T [facility] [address]



             (BEAKER) (test code =                                        [facility],



             153)                                               [address]:



                                                                 /Techni

christina ID



                                                                 = [id number] for ANANT CATES



POCT-GLUCOSE [id number] 21:32:00





             Test Item    Value        Reference Range Interpretation Comments

 

             POC-GLUCOSE METER 275 mg/dL           H            : TESTED A

T [facility] [address]



             (BEAKER) (test code =                                        [facility],



             153)                                               [address]:



                                                                 /Techni

christina ID



                                                                 = [id number] for CR

ISWELL,



                                                                 TIA



POCT-GLUCOSE [id number] 17:47:00





             Test Item    Value        Reference Range Interpretation Comments

 

             POC-GLUCOSE METER 304 mg/dL           H            : TESTED A

T [facility] [address]



             (BEAKER) (test code =                                        [place] [place],



             1538)                                               [address]:



                                                                 /Techni

christina ID



                                                                 = [id number] for LUIZA FIGUEROA



URINE [id number] 15:21:00





             Test Item    Value        Reference Range Interpretation Comments

 

             CULTURE (BEAKER)                           A            >100,000 co

l/mL



             (test code = 1095)                                        Beta-hemo

lytic



                                                                 streptococcus g

roup



                                                                 B, by serologic

al



                                                                 grouping

 

             CULTURE (BEAKER) PROTEUS                   A            80-89,000 c

ol/mL



             (test code = 1095) MIRABILIS                              Proteus



                                                                 mirabilisESBL



                                                                 Positive

 

             Amikacin (test code =                           S            



             1)                                                  

 

             Ampicillin +                           R            



             Sulbactam (test code                                        



             = 6)                                                

 

             Aztreonam (test code                           R            



             = 32)                                               

 

             Cefepime (test code =                           R            



             51)                                                 

 

             Cefoxitin (test code                           R            



             = 68)                                               

 

             Ceftazidime (test                           R            



             code = 27)                                          

 

             Ceftriaxone (test                           R            



             code = 52)                                          

 

             Ertapenem (test code                           S            



             = 38)                                               

 

             Gentamicin (test code                           R            



             = 18)                                               

 

             Levofloxacin (test                           R            



             code = 22)                                          

 

             Meropenem (test code                           S            



             = 34)                                               

 

             Nitrofurantoin (test                           R            



             code = 23)                                          

 

             Tetracycline (test                           R            



             code = 2)                                           

 

             Tobramycin (test code                           R            



             = 25)                                               



POCT-GLUCOSE [id number] 12:16:00





             Test Item    Value        Reference Range Interpretation Comments

 

             POC-GLUCOSE METER 337 mg/dL           H            : TESTED A

T [facility] [address]



             (BEAKER) (test code =                                        [facility] [place],



             1538)                                               [address]:



                                                                 /Techni

christina ID



                                                                 = [id number] for LUIZA FIGUEROA



BASIC METABOLIC [id number] 10:39:00





             Test Item    Value        Reference Range Interpretation Comments

 

             SODIUM (BEAKER) 134 meq/L    136-145      L            



             (test code = 381)                                        

 

             POTASSIUM (BEAKER) 4.6 meq/L    3.5-5.1                   



             (test code = 379)                                        

 

             CHLORIDE (BEAKER) 105 meq/L                        



             (test code = 382)                                        

 

             CO2 (BEAKER) (test 20 meq/L     22-29        L            



             code = 355)                                         

 

             BLOOD UREA NITROGEN 14 mg/dL     7-21                      



             (BEAKER) (test code                                        



             = 354)                                              

 

             CREATININE (BEAKER) 0.97 mg/dL   0.57-1.25                 



             (test code = 358)                                        

 

             GLUCOSE RANDOM 429 mg/dL           HH           



             (BEAKER) (test code                                        



             = 652)                                              

 

             CALCIUM (BEAKER) 8.9 mg/dL    8.4-10.2                  



             (test code = 697)                                        

 

             EGFR (BEAKER) (test 107 mL/min/1.73                           ESTIM

ATED GFR IS



             code = 1092) sq m                                   NOT AS ACCURATE

 AS



                                                                 CREATININE



                                                                 CLEARANCE IN



                                                                 PREDICTING



                                                                 GLOMERULAR



                                                                 FILTRATION RATE

.



                                                                 ESTIMATED GFR I

S



                                                                 NOT APPLICABLE 

FOR



                                                                 DIALYSIS PATIEN

TS.



 ID - MAGAN FPOCT-GLUCOSE [id number] 08:29:00





             Test Item    Value        Reference Range Interpretation Comments

 

             POC-GLUCOSE METER 395 mg/dL           H            : TESTED A

T [facility] [address]



             (BEAKER) (test code =                                        [place],



             1538)                                               [address]:



                                                                 /Techni

christina ID



                                                                 = [id number] for LUIZA FIGUEROA



CBC W/PLT COUNT &amp; AUTO [id number] 06:40:00





             Test Item    Value        Reference Range Interpretation Comments

 

             WHITE BLOOD CELL COUNT (BEAKER) 7.2 K/ L     3.5-10.5              

    



             (test code = 775)                                        

 

             RED BLOOD CELL COUNT (BEAKER) 5.48 M/ L    4.63-6.08               

  



             (test code = 761)                                        

 

             HEMOGLOBIN (BEAKER) (test code = 15.1 GM/DL   13.7-17.5            

     



             410)                                                

 

             HEMATOCRIT (BEAKER) (test code = 46.4 %       40.1-51.0            

     



             411)                                                

 

             MEAN CORPUSCULAR VOLUME (BEAKER) 84.7 fL      79.0-92.2            

     



             (test code = 753)                                        

 

             MEAN CORPUSCULAR HEMOGLOBIN 27.6 pg      25.7-32.2                 



             (BEAKER) (test code = 751)                                        

 

             MEAN CORPUSCULAR HEMOGLOBIN CONC 32.5 GM/DL   32.3-36.5            

     



             (BEAKER) (test code = 752)                                        

 

             RED CELL DISTRIBUTION WIDTH 15.5 %       11.6-14.4    H            



             (BEAKER) (test code = 412)                                        

 

             PLATELET COUNT (BEAKER) (test 315 K/CU MM  150-450                 

  



             code = 756)                                         

 

             MEAN PLATELET VOLUME (BEAKER) 10.5 fL      9.4-12.4                

  



             (test code = 754)                                        

 

             NUCLEATED RED BLOOD CELLS 0 /100 WBC   0-0                       



             (BEAKER) (test code = 413)                                        

 

             NEUTROPHILS RELATIVE PERCENT 62 %                                  

 



             (BEAKER) (test code = 429)                                        

 

             LYMPHOCYTES RELATIVE PERCENT 26 %                                  

 



             (BEAKER) (test code = 430)                                        

 

             MONOCYTES RELATIVE PERCENT 9 %                                    



             (BEAKER) (test code = 431)                                        

 

             EOSINOPHILS RELATIVE PERCENT 2 %                                   

 



             (BEAKER) (test code = 432)                                        

 

             BASOPHILS RELATIVE PERCENT 0 %                                    



             (BEAKER) (test code = 437)                                        

 

             NEUTROPHILS ABSOLUTE COUNT 4.48 K/ L    1.78-5.38                 



             (BEAKER) (test code = 670)                                        

 

             LYMPHOCYTES ABSOLUTE COUNT 1.87 K/ L    1.32-3.57                 



             (BEAKER) (test code = 414)                                        

 

             MONOCYTES ABSOLUTE COUNT (BEAKER) 0.62 K/ L    0.30-0.82           

      



             (test code = 415)                                        

 

             EOSINOPHILS ABSOLUTE COUNT 0.17 K/ L    0.04-0.54                 



             (BEAKER) (test code = 416)                                        

 

             BASOPHILS ABSOLUTE COUNT (BEAKER) 0.03 K/ L    0.01-0.08           

      



             (test code = 417)                                        

 

             IMMATURE GRANULOCYTES-RELATIVE 1 %          0-1                    

   



             PERCENT (BEAKER) (test code =                                      

  



             2801)                                               



POCT-GLUCOSE METER2020-01-10 19:55:00





             Test Item    Value        Reference Range Interpretation Comments

 

             POC-GLUCOSE METER 369 mg/dL           H            : TESTED A

T [facility] [address]



             (BEAKER) (test code =                                        [facility],



             153)                                               [address]:



                                                                 /Techni

christina ID



                                                                 = [id number] for CR

SHERRILL GARCIA



POCT-GLUCOSE METER2020-01-10 16:45:00





             Test Item    Value        Reference Range Interpretation Comments

 

             POC-GLUCOSE METER 272 mg/dL           H            : TESTED A

T [facility] [address]



             (BEAKER) (test code =                                        [facility],



             153)                                               [address]:



                                                                 /Techni

christina ID



                                                                 = [id number] for TH

OMAS,



                                                                 SARAH



POCT-GLUCOSE METER2020-01-10 11:40:00





             Test Item    Value        Reference Range Interpretation Comments

 

             POC-GLUCOSE METER 277 mg/dL           H            : TESTED A

T [facility] [address]



             (BEAKER) (test code =                                        [facility],



             153)                                               [address]:



                                                                 /Techni

christina ID



                                                                 = [id number] for TH

OMAS,



                                                                 SARAH



BASIC METABOLIC PANEL2020-01-10 10:22:00





             Test Item    Value        Reference Range Interpretation Comments

 

             SODIUM (BEAKER) 132 meq/L    136-145      L            



             (test code = 381)                                        

 

             POTASSIUM (BEAKER) 4.4 meq/L    3.5-5.1                   



             (test code = 379)                                        

 

             CHLORIDE (BEAKER) 103 meq/L                        



             (test code = 382)                                        

 

             CO2 (BEAKER) (test 21 meq/L     22-29        L            



             code = 355)                                         

 

             BLOOD UREA NITROGEN 14 mg/dL     7-21                      



             (BEAKER) (test code                                        



             = 354)                                              

 

             CREATININE (BEAKER) 0.89 mg/dL   0.57-1.25                 



             (test code = 358)                                        

 

             GLUCOSE RANDOM 428 mg/dL           HH           



             (BEAKER) (test code                                        



             = 652)                                              

 

             CALCIUM (BEAKER) 9.2 mg/dL    8.4-10.2                  



             (test code = 697)                                        

 

             EGFR (BEAKER) (test 119 mL/min/1.73                           ESTIM

ATED GFR IS



             code = 1092) sq m                                   NOT AS ACCURATE

 AS



                                                                 CREATININE



                                                                 CLEARANCE IN



                                                                 PREDICTING



                                                                 GLOMERULAR



                                                                 FILTRATION RATE

.



                                                                 ESTIMATED GFR I

S



                                                                 NOT APPLICABLE 

FOR



                                                                 DIALYSIS PATIEN

TS.



 ID - NTPLIPID PANEL2020-01-10 10:17:00





             Test Item    Value        Reference Range Interpretation Comments

 

             TRIGLYCERIDES (BEAKER) (test code = 692 mg/dL                      

        



             540)                                                

 

             CHOLESTEROL (BEAKER) (test code = 196 mg/dL                        

      



             631)                                                

 

             HDL CHOLESTEROL (BEAKER) (test code 24 mg/dL                       

        



             = 976)                                              



Calculated LDL not valid if triglyceride &gt;400 mg/dLTriglyceride Reference 
Range:   Low Risk  &lt;150   Borderline    150-199   High Risk     200-499   
Very High Risk  &gt;=500Cholesterol Reference Range:   Low Risk         &lt;200 
 Borderline    200-239    High Risk        &gt;240HDL Cholesterol Reference 
Range:   Low Risk         &gt;=60   High Risk         &lt;40LDL Cholesterol 
ReferenceRange:   Optimal          &lt;100   Near Optimal  100-129   Borderline 
  130-159   High          160-189   Very High       &gt;=190    ID - NTP
POCT-GLUCOSE METER2020-01-10 08:28:00





             Test Item    Value        Reference Range Interpretation Comments

 

             POC-GLUCOSE METER 388 mg/dL           H            : TESTED SALOME

[facility] [address]



             (BEAKER) (test code =                                        [place],



             1538)                                               [address]:



                                                                 /Techni

christina ID



                                                                 = [id number] for ANANT CATES



HEMOGLOBIN A1C2020-01-10 07:54:00





             Test Item    Value        Reference Range Interpretation Comments

 

             HEMOGLOBIN A1C (BEAKER) (test code = 10.4 %       4.3-6.1      H   

         



             368)                                                



CBC W/PLT COUNT &amp; AUTO DIFFERENTIAL2020-01-10 05:56:00





             Test Item    Value        Reference Range Interpretation Comments

 

             WHITE BLOOD CELL COUNT (BEAKER) 6.5 K/ L     3.5-10.5              

    



             (test code = 775)                                        

 

             RED BLOOD CELL COUNT (BEAKER) 5.21 M/ L    4.63-6.08               

  



             (test code = 761)                                        

 

             HEMOGLOBIN (BEAKER) (test code = 14.6 GM/DL   13.7-17.5            

     



             410)                                                

 

             HEMATOCRIT (BEAKER) (test code = 44.3 %       40.1-51.0            

     



             411)                                                

 

             MEAN CORPUSCULAR VOLUME (BEAKER) 85.0 fL      79.0-92.2            

     



             (test code = 753)                                        

 

             MEAN CORPUSCULAR HEMOGLOBIN 28.0 pg      25.7-32.2                 



             (BEAKER) (test code = 751)                                        

 

             MEAN CORPUSCULAR HEMOGLOBIN CONC 33.0 GM/DL   32.3-36.5            

     



             (BEAKER) (test code = 752)                                        

 

             RED CELL DISTRIBUTION WIDTH 15.6 %       11.6-14.4    H            



             (BEAKER) (test code = 412)                                        

 

             PLATELET COUNT (BEAKER) (test 294 K/CU MM  150-450                 

  



             code = 756)                                         

 

             MEAN PLATELET VOLUME (BEAKER) 11.2 fL      9.4-12.4                

  



             (test code = 754)                                        

 

             NUCLEATED RED BLOOD CELLS 0 /100 WBC   0-0                       



             (BEAKER) (test code = 413)                                        

 

             NEUTROPHILS RELATIVE PERCENT 56 %                                  

 



             (BEAKER) (test code = 429)                                        

 

             LYMPHOCYTES RELATIVE PERCENT 31 %                                  

 



             (BEAKER) (test code = 430)                                        

 

             MONOCYTES RELATIVE PERCENT 10 %                                   



             (BEAKER) (test code = 431)                                        

 

             EOSINOPHILS RELATIVE PERCENT 2 %                                   

 



             (BEAKER) (test code = 432)                                        

 

             BASOPHILS RELATIVE PERCENT 1 %                                    



             (BEAKER) (test code = 437)                                        

 

             NEUTROPHILS ABSOLUTE COUNT 3.66 K/ L    1.78-5.38                 



             (BEAKER) (test code = 670)                                        

 

             LYMPHOCYTES ABSOLUTE COUNT 2.03 K/ L    1.32-3.57                 



             (BEAKER) (test code = 414)                                        

 

             MONOCYTES ABSOLUTE COUNT (BEAKER) 0.63 K/ L    0.30-0.82           

      



             (test code = 415)                                        

 

             EOSINOPHILS ABSOLUTE COUNT 0.15 K/ L    0.04-0.54                 



             (BEAKER) (test code = 416)                                        

 

             BASOPHILS ABSOLUTE COUNT ([facility]) 0.03 K/ L    0.01-0.08           

      



             (test code = 417)                                        

 

             IMMATURE GRANULOCYTES-RELATIVE 1 %          0-1                    

   



             PERCENT ([facility]) (test code =                                      

  



             2801)                                               



POCT-GLUCOSE [id number] 23:47:00





             Test Item    Value        Reference Range Interpretation Comments

 

             POC-GLUCOSE METER 359 mg/dL           H            : Notified

 RN/MD:



             ([facility]) (test code =                                        TESTED

 AT [place] [address]



             153)                                               [facility],



                                                                 [address]:



                                                                 /Techni

christina ID



                                                                 = [id number] for



                                                                 LATHBRIDGE, ALESSIA

ICE



POCT-GLUCOSE [id number] 21:13:00





             Test Item    Value        Reference Range Interpretation Comments

 

             POC-GLUCOSE METER 315 mg/dL           H            : TESTED A

T [facility] [address]



             ([facility]) (test code =                                        [facility],



             153)                                               [address]:



                                                                 /Techni

christina ID



                                                                 = [id number] for Sm

ith,



                                                                 Nicki



POCT-GLUCOSE [id number] 21:13:00





             Test Item    Value        Reference Range Interpretation Comments

 

             POC-GLUCOSE METER 331 mg/dL           H            : TESTED A

[facility]



             ([facility]) (test code =                                        [facility],



             153)                                               [address]:



                                                                 /Techni

christina ID



                                                                 = [id number] for RO

DGCHANTALE,



                                                                 DAVIDECA



POCT-GLUCOSE [id number] 10:30:00





             Test Item    Value        Reference Range Interpretation Comments

 

             POC-GLUCOSE METER 341 mg/dL           H            : TESTED A

T [facility] 67[address]



             ([facility]) (test code =                                        [facility],



             153)                                               [id number]:



                                                                 /Techni

christina ID



                                                                 = [id number] for Sm

ith,



                                                                 Nicki



CBC W/PLT COUNT &amp; AUTO [id number] 08:48:00





             Test Item    Value        Reference Range Interpretation Comments

 

             WHITE BLOOD CELL COUNT (AKER) 6.7 K/ L     3.5-10.5              

    



             (test code = 775)                                        

 

             RED BLOOD CELL COUNT (AKER) 5.28 M/ L    4.63-6.08               

  



             (test code = 761)                                        

 

             HEMOGLOBIN (BEAKER) (test code = 14.6 GM/DL   13.7-17.5            

     



             410)                                                

 

             HEMATOCRIT (BEAKER) (test code = 44.9 %       40.1-51.0            

     



             411)                                                

 

             MEAN CORPUSCULAR VOLUME (BEAKER) 85.0 fL      79.0-92.2            

     



             (test code = 753)                                        

 

             MEAN CORPUSCULAR HEMOGLOBIN 27.7 pg      25.7-32.2                 



             (BEAKER) (test code = 751)                                        

 

             MEAN CORPUSCULAR HEMOGLOBIN CONC 32.5 GM/DL   32.3-36.5            

     



             (BEAKER) (test code = 752)                                        

 

             RED CELL DISTRIBUTION WIDTH 15.3 %       11.6-14.4    H            



             (BEAKER) (test code = 412)                                        

 

             PLATELET COUNT (BEAKER) (test 300 K/CU MM  150-450                 

  



             code = 756)                                         

 

             MEAN PLATELET VOLUME (BEAKER) 10.4 fL      9.4-12.4                

  



             (test code = 754)                                        

 

             NUCLEATED RED BLOOD CELLS 0 /100 WBC   0-0                       



             (BEAKER) (test code = 413)                                        



(MANUAL DIFFERENTIAL)2020 08:48:00





             Test Item    Value        Reference Range Interpretation Comments

 

             NEUTROPHILS - REL (DIFF) (BEAKER) 69 %                             

      



             (test code = 1359)                                        

 

             LYMPHOCYTES - REL (DIFF) (BEAKER) 25 %                             

      



             (test code = 1360)                                        

 

             MONOCYTES - REL (DIFF) (BEAKER) 3 %                                

    



             (test code = 1361)                                        

 

             EOSINOPHILS - REL (DIFF) (BEAKER) 3 %                              

      



             (test code = 1362)                                        

 

             BASOPHILS - REL (DIFF) (BEAKER) 0 %                                

    



             (test code = 1363)                                        

 

             NEUTROPHILS - ABS (DIFF) (BEAKER) 4.62 K/ L    1.80-8.00           

      



             (test code = 1365)                                        

 

             LYMPHOCYTES - ABS (DIFF) (BEAKER) 1.68 K/ L    1.48-4.50           

      



             (test code = 1366)                                        

 

             MONOCYTES - ABS (DIFF) (BEAKER) 0.20 K/ L    0.00-1.30             

    



             (test code = 1367)                                        

 

             EOSINOPHILS - ABS (DIFF) (BEAKER) 0.20 K/ L    0.00-0.50           

      



             (test code = 1368)                                        

 

             BASOPHILS - ABS (DIFF) (BEAKER) 0.00 K/ L    0.00-0.20             

    



             (test code = 1369)                                        

 

             TOTAL COUNTED (BEAKER) (test code = 100                            

        



             1351)                                               

 

             PLT MORPHOLOGY (BEAKER) (test code Normal                          

       



             = 486)                                              

 

             RBC MORPHOLOGY (BEAKER) (test code Normal                          

       



             = 762)                                              

 

             SMUDGE CELLS (BEAKER) (test code = Present                         

       



             1371)                                               



HEMOGLOBIN [id number] 06:39:00





             Test Item    Value        Reference Range Interpretation Comments

 

             HEMOGLOBIN A1C (BEAKER) (test code = 10.5 %       4.3-6.1      H   

         



             368)                                                



LIPID [id number] 04:57:00





             Test Item    Value        Reference Range Interpretation Comments

 

             TRIGLYCERIDES (BEAKER) 1251 mg/dL                             Speci

men moderately



             (test code = 540)                                        hemolyzed

 

             CHOLESTEROL (BEAKER) 215 mg/dL                              Specime

n moderately



             (test code = 631)                                        hemolyzed

 

             HDL CHOLESTEROL 22 mg/dL                               



             (BEAKER) (test code =                                        



             976)                                                



Calculated LDL not valid if triglyceride &gt;400 mg/dLTriglyceride Reference 
Range:   Low Risk  &lt;150   Borderline    150-199   High Risk     200-499   
Very High Risk  &gt;=500Cholesterol Reference Range:   Low Risk         &lt;200 
 Borderline    200-239    High Risk        &gt;240HDL Cholesterol Reference 
Range:   Low Risk         &gt;=60   High Risk         &lt;40LDL Cholesterol 
ReferenceRange:   Optimal          &lt;100   Near Optimal  100-129   Borderline 
  130-159   High          160-189   Very High       &gt;=190   Specimen 
moderately lipemicBASIC METABOLIC [id number] 04:56:00





             Test Item    Value        Reference Range Interpretation Comments

 

             SODIUM (BEAKER) 137 meq/L    136-145                   



             (test code = 381)                                        

 

             POTASSIUM (BEAKER) 4.6 meq/L    3.5-5.1                   Specimen 

moderately



             (test code = 379)                                        hemolyzed

 

             CHLORIDE (BEAKER) 106 meq/L                        



             (test code = 382)                                        

 

             CO2 (BEAKER) (test 20 meq/L     22-29        L            



             code = 355)                                         

 

             BLOOD UREA NITROGEN 12 mg/dL     7-21                      



             (BEAKER) (test code                                        



             = 354)                                              

 

             CREATININE (BEAKER) 0.95 mg/dL   0.57-1.25                 Specimen

 moderately



             (test code = 358)                                        hemolyzed

 

             GLUCOSE RANDOM 398 mg/dL           H            



             (BEAKER) (test code                                        



             = 652)                                              

 

             CALCIUM (BEAKER) 8.8 mg/dL    8.4-10.2                  



             (test code = 697)                                        

 

             EGFR (BEAKER) (test 110 mL/min/1.73                           ESTIM

ATED GFR IS



             code = 1092) sq m                                   NOT AS ACCURATE

 AS



                                                                 CREATININE



                                                                 CLEARANCE IN



                                                                 PREDICTING



                                                                 GLOMERULAR



                                                                 FILTRATION RATE

.



                                                                 ESTIMATED GFR I

S



                                                                 NOT APPLICABLE 

FOR



                                                                 DIALYSIS PATIEN

TS.



POCT-GLUCOSE [id number] 21:53:00





             Test Item    Value        Reference Range Interpretation Comments

 

             POC-GLUCOSE METER > mg/dL             HH           : Notified

 RN/MD: TESTED



             (BETAL) (test code =                                        AT [facility] [address] [facility]



             1538)                                               [facility], [address]

30:



                                                                 /Techni

christina ID =



                                                                 [id number] for DENCANDIE

IS, ZECHARIAH



U/S, ABDOMINAL, [id number] 19:54:00Reason for exam:-&gt;Evaluation of  
shunt and for possible cyst or pseudocyst Should this be performed at the 
bedside?-&gt;YesFINAL REPORT PATIENT ID:   [id number] Limited abdominal 
ultrasound. CLINICAL HISTORY: Evaluation of VPshunt for possible cyst or 
pseudocyst. COMPARISON STUDY: None available. FINDINGS: Sonographic assessment 
of the abdomen was performed assessing for fluid in the region of the patient's 
shunts. No fluid collections are seen. However, the study is limited by the 
patient's body habitus. CT scan would bemore sensitive. Signed: Madison Hendrickson 
MDReport Verified Date/Time:  2020 19:54:10 Reading Location: [address]
Consult Reading Room      Electronically signed by: MADISON HENDRICKSON M.D. on 
2020 07:54 PMPOCT-GLUCOSE [id number] 19:34:00





             Test Item    Value        Reference Range Interpretation Comments

 

             POC-GLUCOSE METER 474 mg/dL           HH           : Notified

 RN/MD:



             (KUN) (test code =                                        TESTED

 AT [place] [address]



             1539)                                               [facility],



                                                                 [id number]:



                                                                 /Techni

christina ID



                                                                 = [id number] for AK

INSONU,



                                                                 LONA



URINALYSIS W/ REFLEX URINE [id number] 17:48:00





             Test Item    Value        Reference Range Interpretation Comments

 

             COLOR (BEAKER) (test code = 470) Yellow                            

     

 

             CLARITY (BEAKER) (test code = Hazy                                 

  



             469)                                                

 

             SPECIFIC GRAVITY UA (BEAKER) 1.020        1.001-1.035              

 



             (test code = 468)                                        

 

             PH UA (BEAKER) (test code = 467) 6.0          5.0-8.0              

     

 

             PROTEIN UA (BEAKER) (test code = 20 mg/dL     Negative     A       

     



             464)                                                

 

             GLUCOSE UA (BEAKER) (test code = >1000 mg/dL  Negative     A       

     



             365)                                                

 

             KETONES UA (BEAKER) (test code = 40 mg/dL     Negative     A       

     



             371)                                                

 

             BILIRUBIN UA (BEAKER) (test code Negative     Negative             

     



             = 462)                                              

 

             BLOOD UA (BEAKER) (test code = Moderate     Negative     A         

   



             461)                                                

 

             NITRITE UA (BEAKER) (test code = Positive     Negative     A       

     



             465)                                                

 

             LEUKOCYTE ESTERASE UA (BEAKER) Large        Negative     A         

   



             (test code = 466)                                        

 

             UROBILINOGEN UA (BEAKER) (test 0.2 mg/dL    0.2-1.0                

   



             code = 463)                                         

 

             RBC UA (BEAKER) (test code = 519) 0 /HPF                           

      

 

             WBC UA (BEAKER) (test code = 520) 267 /HPF                         

      

 

             BACTERIA (BEAKER) (test code = Moderate                            

   



             517)                                                

 

             SOURCE(BEAKER) (test code = 4107)                                  

      



CBC W/PLT COUNT &amp; AUTO [id number] 17:38:00





             Test Item    Value        Reference Range Interpretation Comments

 

             WHITE BLOOD CELL COUNT (BEAKER) 7.8 K/ L     3.5-10.5              

    



             (test code = 775)                                        

 

             RED BLOOD CELL COUNT (BEAKER) 5.12 M/ L    4.63-6.08               

  



             (test code = 761)                                        

 

             HEMOGLOBIN (BEAKER) (test code = 14.7 GM/DL   13.7-17.5            

     



             410)                                                

 

             HEMATOCRIT (BEAKER) (test code = 43.3 %       40.1-51.0            

     



             411)                                                

 

             MEAN CORPUSCULAR VOLUME (BEAKER) 84.6 fL      79.0-92.2            

     



             (test code = 753)                                        

 

             MEAN CORPUSCULAR HEMOGLOBIN 28.7 pg      25.7-32.2                 



             (BEAKER) (test code = 751)                                        

 

             MEAN CORPUSCULAR HEMOGLOBIN CONC 33.9 GM/DL   32.3-36.5            

     



             (BEAKER) (test code = 752)                                        

 

             RED CELL DISTRIBUTION WIDTH 15.3 %       11.6-14.4    H            



             (BEAKER) (test code = 412)                                        

 

             PLATELET COUNT (BEAKER) (test 344 K/CU MM  150-450                 

  



             code = 756)                                         

 

             MEAN PLATELET VOLUME (BEAKER) 11.0 fL      9.4-12.4                

  



             (test code = 754)                                        

 

             NUCLEATED RED BLOOD CELLS 0 /100 WBC   0-0                       



             (BEAKER) (test code = 413)                                        



(CELLAVISION MANUAL DIFF)2020 17:38:00





             Test Item    Value        Reference Range Interpretation Comments

 

             NEUTROPHILS - REL 63 %                                   



             (CELLAVISION)(BEAKER) (test code                                   

     



             = 2816)                                             

 

             LYMPHOCYTES - REL 23 %                                   



             (CELLAVISION)(BEAKER) (test code                                   

     



             = 2817)                                             

 

             MONOCYTES - REL 6 %                                    



             (CELLAVISION)(BEAKER) (test code                                   

     



             = 2818)                                             

 

             EOSINOPHILS - REL 5 %                                    



             (CELLAVISION)(BEAKER) (test code                                   

     



             = 2819)                                             

 

             BASOPHILS - REL 1 %                                    



             (CELLAVISION)(BEAKER) (test code                                   

     



             = 2820)                                             

 

             BANDS - REL (CELLAVISION)(BEAKER) 2 %          0-10                

      



             (test code = 2826)                                        

 

             NEUTROPHILS - ABS 4.91 K/ul    1.78-5.38                 



             (CELLAVISION)(BEAKER) (test code                                   

     



             = 2830)                                             

 

             LYMPHOCYTES - ABS 1.79 K/ul    1.32-3.57                 



             (CELLAVISION)(BEAKER) (test code                                   

     



             = 2831)                                             

 

             MONOCYTES - ABS 0.47 K/uL    0.30-0.82                 



             (CELLAVISION)(BEAKER) (test code                                   

     



             = 2832)                                             

 

             EOSINOPHILS - ABS 0.39 K/uL    0.04-0.54                 



             (CELLAVISION)(BEAKER) (test code                                   

     



             = 2834)                                             

 

             BASOPHILS - ABS 0.08 K/uL    0.01-0.08                 



             (CELLAVISION)(BEAKER) (test code                                   

     



             = 2835)                                             

 

             BANDS - ABS (CELLAVISION)(BEAKER) 0.16 K/uL    0.00-0.80           

      



             (test code = 2840)                                        

 

             TOTAL COUNTED (BEAKER) (test code 100                              

      



             = 1351)                                             

 

             SMUDGE CELLS (BEAKER) (test code Present                           

     



             = 1371)                                             

 

             GIANT PLATELETS (BEAKER) (test Present                             

   



             code = 313)                                         

 

             ANISOCYTOSIS (BEAKER) (test code 1+ few                            

     



             = 961)                                              

 

             POIKILOCYTES (BEAKER) (test code 2+ moderate                       

     



             = 966)                                              

 

             SPHEROCYTES (BEAKER) (test code = 1+ few                           

      



             768)                                                

 

             OVALOCYTES (BEAKER) (test code = 1+ few                            

     



             477)                                                

 

             TEAR DROP CELLS (BEAKER) (test 1+ few                              

   



             code = 481)                                         

 

             ARTIFACT (CELLAVISION)(BEAKER) Present                             

   



             (test code = 3432)                                        

 

             PLATELET CONCENTRATION Adequate                               



             (CELLAVISION)(BEAKER) (test code                                   

     



             = 3438)                                             



Received comment: User comments: Slide comments:POCT-GLUCOSE [id number] 
16:27:00





             Test Item    Value        Reference Range Interpretation Comments

 

             POC-GLUCOSE METER 424 mg/dL           HH           : Notified

 RN/MD:



             (KUN) (test code =                                        TESTED

 AT [facility] 1392 1495)                                               [facility],



                                                                 [id number]:



                                                                 /Techni

christina ID



                                                                 = [id number] for AK

INSONU,



                                                                 LONA



CT, BRAIN, WITHOUT [id number] 15:31:00FINAL REPORT PATIENT ID:   
[id number] CT, BRAIN, WITHOUT CONTRAST CLINICAL INDICATION:  Hydrocephalus C
OMPARISON: None TECHNIQUE:  Noncontrast axial CT imaging of the brain and skull.
 DOSE REDUCTION: Dose modulation, iterative reconstruction, and/or weight-based 
adjustment of the mA/kV was utilized to reduce the radiation dose to as low as 
reasonably achievable. FINDINGS:A total of two ventricular drainage catheters 
are present. A right transverse temporal catheter terminates across the midline 
just above the level of the foramen of Monro. A right parietal approach catheter
terminates in the pineal region. Supratentorial ventricular configuration is 
slitlike. There is no herniation. The basilar cisterns are preserved. There is 
no identifiable recent infarct. 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 Calvarial configuration escape of 
cephalic. Thereis no acute osseous abnormality.  IMPRESSION: Chronic, likely 
congenital parenchymal changes withoutrecent infarct or hemorrhage. A total of 
two ventricular drainage catheters are present. Supratentorial ventricular 
configuration is slitlike and may represent over shunting. Correlation 
recommended. Signed: JR Rae Robert MDReport Verified Date/Time:  
2020 15:31:08 Reading Location: [address] Neuro Reading Room    
Electronically signed by: ALONDRA RAE on 2020 03:31 PMRAD, 
SHUNT [id number] 15:13:00Reason for exam:-&gt;concern for VPS malfunction
FINAL REPORT PATIENT ID:   [id number] RAD, SHUNT SERIES INDICATION: concern for 
VPS malfunction COMPARISON: None TECHNIQUE: AP and lateral radiographs of the 
skull, abdomen and chest were acquired to evaluate shunt catheter FINDINGS:An 
abandoned shunt catheter is present within the right neck. Medial tothis a 
second shunt catheter is present which descends along the left chest wall, 
terminating withinthe mid pelvis. Radiopaque portions of the catheter appear 
contiguous. Signed: Lisa Tan MDReport Verified Date/Time:  2020 
15:13:54 Reading Location: [facility] Radiology Reading Room  Electronically 
signed by: LISA TAN MD on 2020 03:13 PMPOCT-GLUCOSE METER
2020 11:38:00





             Test Item    Value        Reference Range Interpretation Comments

 

             POC-GLUCOSE METER 394 mg/dL           H            : TESTED A

T [facility] 67[address]



             ([facility]) (test code =                                        MILY NELSON [facility],



             1538)                                               [id number]:



                                                                 /Techni

christina ID



                                                                 = [id number] for LONA URIOSTEGUI



HEMOGLOBIN [id number] 09:15:00





             Test Item    Value        Reference Range Interpretation Comments

 

             HEMOGLOBIN A1C (BEAKER) (test code = 10.4 %       4.3-6.1      H   

         



             368)                                                



TSH/FREE T4 IF [id number] 07:54:00





             Test Item    Value        Reference Range Interpretation Comments

 

             THYROID STIMULATING HORMONE 1.40 uIU/mL  0.35-4.94                 



             (BEAKER) (test code = 772)                                        



POCT-GLUCOSE [id number] 07:48:00





             Test Item    Value        Reference Range Interpretation Comments

 

             POC-GLUCOSE METER > mg/dL             HH           : Notified

 RN/MD: TESTED



             (BEAKER) (test code =                                        AT [facility] [address] [facility]



             1538)                                               [facility], [address]

30:



                                                                 /Techni

christina ID =



                                                                 [id number] for LONA GOLDSMITH



BASIC METABOLIC [id number] 07:44:00





             Test Item    Value        Reference Range Interpretation Comments

 

             SODIUM (BEAKER) 134 meq/L    136-145      L            



             (test code = 381)                                        

 

             POTASSIUM (BEAKER) 4.4 meq/L    3.5-5.1                   



             (test code = 379)                                        

 

             CHLORIDE (BEAKER) 103 meq/L                        



             (test code = 382)                                        

 

             CO2 (BEAKER) (test 20 meq/L     22-29        L            



             code = 355)                                         

 

             BLOOD UREA NITROGEN 17 mg/dL     7-21                      



             (BEAKER) (test code                                        



             = 354)                                              

 

             CREATININE (BEAKER) 1.09 mg/dL   0.57-1.25                 



             (test code = 358)                                        

 

             GLUCOSE RANDOM 464 mg/dL           HH           



             (BEAKER) (test code                                        



             = 652)                                              

 

             CALCIUM (BEAKER) 8.6 mg/dL    8.4-10.2                  



             (test code = 697)                                        

 

             EGFR (BEAKER) (test 94 mL/min/1.73                           ESTIMA

HUMA GFR IS



             code = 1092) sq m                                   NOT AS ACCURATE

 AS



                                                                 CREATININE



                                                                 CLEARANCE IN



                                                                 PREDICTING



                                                                 GLOMERULAR



                                                                 FILTRATION RATE

.



                                                                 ESTIMATED GFR I

S



                                                                 NOT APPLICABLE 

FOR



                                                                 DIALYSIS PATIEN

TS.



LIPID [id number] 07:34:00





             Test Item    Value        Reference Range Interpretation Comments

 

             TRIGLYCERIDES (BEAKER) (test code = 750 mg/dL                      

        



             540)                                                

 

             CHOLESTEROL (BEAKER) (test code = 196 mg/dL                        

      



             631)                                                

 

             HDL CHOLESTEROL (BEAKER) (test code 22 mg/dL                       

        



             = 976)                                              



Calculated LDL not valid if triglyceride &gt;400 mg/dLTriglyceride Reference 
Range:   Low Risk  &lt;150   Borderline    150-199   High Risk     200-499   
Very High Risk  &gt;=500Cholesterol Reference Range:   Low Risk         &lt;200 
 Borderline    200-239    High Risk        &gt;240HDL Cholesterol Reference 
Range:   Low Risk         &gt;=60   High Risk         &lt;40LDL Cholesterol 
ReferenceRange:   Optimal          &lt;100   Near Optimal  100-129   Borderline 
  130-159   High          160-189   Very High       &gt;=190POCT-GLUCOSE METER
2020 00:49:00





             Test Item    Value        Reference Range Interpretation Comments

 

             POC-GLUCOSE METER 399 mg/dL           H            : TESTED A

T [facility] [address]



             [facility] (test code =                                        [place],



             1538)                                               [id number]:



                                                                 /Techni

christina ID



                                                                 = [id number] for CLAY BATRES



RAD, FOOT, 2 VIEWS, [id number] 22:28:00Reason for exam:-&gt;osteomyletitis
FINAL REPORT PATIENT ID:   [id number] TECHNIQUE: Two views each of the bilateral 
feet HISTORY: osteomyletitis. COMPARISON: None. IMPRESSION:No acute displaced 
fracture or dislocation. Joint spaces are within normal limits.Severe soft 
tissue swelling.On the right subcentimeter nonspecific calcifications adjacent 
to the heel plantar aspect. Correlate with physical exam. Severely limited exam 
due to patient body habitus. No definite cortical irregularity.There is clinical
concern for osteomyelitis, recommend MRI with contrast. Signed: Sriram Townsendeport Verified Date/Time:  2020 22:28:25 Reading Location: [facility] C013W
Consult Reading Room  Electronically signed by: SRIRAM TOWNSEND DO on 
2020 10:28 PMRAD, FOOT, 2 VIEWS, [id number] 22:28:00Reason for 
exam:-&gt;osteomyletitsFINAL REPORT PATIENT ID:   [id number] TECHNIQUE: Two views 
each of the bilateral feet HISTORY: osteomyletitis. COMPARISON: None. 
IMPRESSION:No acute displaced fracture or dislocation. Joint spaces are within 
normal limits.Severe soft tissue swelling.On the right subcentimeter nonspecific
calcifications adjacent to the heel plantar aspect. Correlate with physical 
exam. Severely limited exam due to patient body habitus. No definite cortical 
irregularity.There is clinical concern for osteomyelitis, recommend MRI with 
contrast. Signed: Sriram Townsend MDReport Verified Date/Time:  2020 
22:28:25 Reading Location: [facility] C013W Consult Reading Room  Electronically 
signed by: SRIRAM TOWNSEND DO on 2020 10:28 PMPOCT-GLUCOSE METER
2020 21:04:00





             Test Item    Value        Reference Range Interpretation Comments

 

             POC-GLUCOSE METER 430 mg/dL           HH           : Notified

 RN/MD:



             (KUN) (test code =                                        TESTED

 AT [facility] [address]



             1538)                                               [facility],



                                                                 [id number]:



                                                                 /Techni

christina ID



                                                                 = [id number] for DEYSI ADRIAN



ERTAPENEM:SUSC:PT:ISOLATE:ORDQN:[id number] 16:48:00





             Test Item    Value        Reference Range Interpretation Comments

 

             Culture: Urine (test >100,000 CFU/mL Proteus                       

    



             code = Culture: mirabilis 10,000 -                           



             Urine)       50,000 CFU/mL Skin Jaime                           



[facility]PT:ISOLATE:ORDQN:[id number] 16:48:00





             Test Item    Value        Reference Range Interpretation Comments

 

             Proteus mirabilis (test Proteus mirabilis                          

 



             code = Proteus mirabilis)                                        



[facility] [id number] 16:48:00





             Test Item    Value        Reference Range Interpretation Comments

 

             UA Nitrite (test code Negative (18 10:48                      

     



             = UA Nitrite) AM)                                    



[facility] AND [id number] 16:48:00





             Test Item    Value        Reference Range Interpretation Comments

 

             UA Bili (test code = Negative *NA*(18                         

  



             UA Bili)     10:48 AM)                              



[facility] AND [id number] 16:48:00





             Test Item    Value        Reference Range Interpretation Comments

 

             UA Ketones (test code Negative *NA*(18                        

   



             = UA Ketones) 10:48 AM)                              



[facility] AND [id number] 16:48:00





             Test Item    Value        Reference Range Interpretation Comments

 

             UA Blood (test code = Trace *ABN*(18                          

 



             UA Blood)    10:48 AM)                              



[facility] AND [id number] 16:48:00





             Test Item    Value        Reference Range Interpretation Comments

 

             UA Urobilinogen (test code = UA 0.2          0.1-1.0               

    



             Urobilinogen)                                        



[facility] AND [id number] 16:48:00





             Test Item    Value        Reference Range Interpretation Comments

 

             UA Leuk Est (test code Large *ABN*(18                         

  



             = UA Leuk Est) 10:48 AM)                              



[facility] AND [id number] 16:48:00





             Test Item    Value        Reference Range Interpretation Comments

 

             UA Protein (test code Negative (18 10:48                      

     



             = UA Protein) AM)                                    



[facility] AND [id number] 16:48:00





             Test Item    Value        Reference Range Interpretation Comments

 

             UA Glucose (test code Negative (18 10:48                      

     



             = UA Glucose) AM)                                    



[facility] AND [id number] 16:48:00





             Test Item    Value        Reference Range Interpretation Comments

 

             UA pH (test code = UA pH) 7.0 1        5.0-8.0                   



[facility] AND [id number] 16:48:00





             Test Item    Value        Reference Range Interpretation Comments

 

             UA Spec Grav (test code = UA Spec 1.015 1                          

      



             Grav)                                               



[facility] AND [id number] 16:48:00





             Test Item    Value        Reference Range Interpretation Comments

 

             UA Color (test code = Yellow *NA*(18                          

 



             UA Color)    10:48 AM)                              



[facility] AND [id number] 16:48:00





             Test Item    Value        Reference Range Interpretation Comments

 

             UA Turbidity (test code = Clear (18 10:48                     

      



             UA Turbidity) AM)                                    



[facility] AND [id number] 16:48:00





             Test Item    Value        Reference Range Interpretation Comments

 

             UA Mucus (test code = UA Mucus) Few /LPF                           

    



[facility] AND [id number] 16:48:00





             Test Item    Value        Reference Range Interpretation Comments

 

             UA Bacteria (test code = UA Few /HPF                               



             Bacteria)                                           



[facility] AND [id number] 16:48:00





             Test Item    Value        Reference Range Interpretation Comments

 

             UA RBC (test code = 0-2 /HPF     See_Comment                [Automa

huma message] The



             UA RBC)                                             system which ge

nerated



                                                                 this result tra

nsmitted



                                                                 reference range

: <=2. The



                                                                 reference range

 was not



                                                                 used to interpr

et this



                                                                 result as zayda

l/abnormal.



[facility] AND [id number] 16:48:00





             Test Item    Value        Reference Range Interpretation Comments

 

             UA Sq Epi (test code = None Seen (18                          

 



             UA Sq Epi)   10:48 AM)                              



[facility] AND [id number] 16:48:00





             Test Item    Value        Reference Range Interpretation Comments

 

             UA WBC (test code = UA WBC)  /HPF                            



[facility] [id number] 11:57:00





             Test Item    Value        Reference Range Interpretation Comments

 

             Antibody Scrn (test Negative (18 5:57                         

  



             code = Antibody Scrn) AM)                                    



[facility] [employer] [id number] 11:57:00





             Test Item    Value        Reference Range Interpretation Comments

 

             ABO/Rh (test code = ABO/Rh) AB POS                                 



[id number] 11:57:00





             Test Item    Value        Reference Range Interpretation Comments

 

             PTT (test code = PTT) 33.4 s       22.9-35.8                 



[id number] 11:57:00





             Test Item    Value        Reference Range Interpretation Comments

 

             PT (test code = PT) 13.7 s       12.0-14.7                 



[id number] 11:57:00





             Test Item    Value        Reference Range Interpretation Comments

 

             INR (test code = INR) 1.05 1       0.85-1.17                 



[id number] 10:50:01





             Test Item    Value        Reference Range Interpretation Comments

 

             MCHC (test code = MCHC) 34.0         32.0-36.0                 



[facility] H[id number] 10:50:01





             Test Item    Value        Reference Range Interpretation Comments

 

             RDW (test code = RDW) 18.9         11.5-14.5                 



[facility] [id number] 10:50:01





             Test Item    Value        Reference Range Interpretation Comments

 

             Hct (test code = Hct) 43.3         42.0-54.0                 



[facility] H[id number] 10:50:01





             Test Item    Value        Reference Range Interpretation Comments

 

             WBC (test code = WBC) 9.3          3.7-10.4                  



[facility] [id number] 10:50:01





             Test Item    Value        Reference Range Interpretation Comments

 

             Hgb (test code = Hgb) 14.7         14.0-18.0                 



[facility] [id number] 10:50:01





             Test Item    Value        Reference Range Interpretation Comments

 

             RBC (test code = RBC) 5.36         4.70-6.10                 



[facility] H[id number] 10:50:01





             Test Item    Value        Reference Range Interpretation Comments

 

             Eosinophils # (test code 0.2          See_Comment                [A

utomated message] The



             = Eosinophils #)                                        system whic

h generated



                                                                 this result tra

nsmitted



                                                                 reference range

: <=0.5.



                                                                 The reference r

zhen was



                                                                 not used to int

erpret



                                                                 this result as



                                                                 normal/abnormal

.



[facility] H[id number] 10:50:01





             Test Item    Value        Reference Range Interpretation Comments

 

             Basophils # (test code 0.1          See_Comment                [Aut

omated message] The



             = Basophils #)                                        system which 

generated



                                                                 this result tra

nsmitted



                                                                 reference range

: <=0.2.



                                                                 The reference r

zhen was



                                                                 not used to int

erpret



                                                                 this result as



                                                                 normal/abnormal

.



[facility] [id number] 10:50:01





             Test Item    Value        Reference Range Interpretation Comments

 

             Lymphocytes # (test code = Lymphocytes 1.8          1.0-5.5        

           



             #)                                                  



[facility] H[id number] 10:50:01





             Test Item    Value        Reference Range Interpretation Comments

 

             Monocytes # (test code 0.9          See_Comment                [Aut

omated message] The



             = Monocytes #)                                        system which 

generated



                                                                 this result tra

nsmitted



                                                                 reference range

: <=0.8.



                                                                 The reference r

zhen was



                                                                 not used to int

erpret



                                                                 this result as



                                                                 normal/abnormal

.



[facility] H[id number] 10:50:01





             Test Item    Value        Reference Range Interpretation Comments

 

             Neutrophils # (test code = Neutrophils 6.3          1.5-8.1        

           



             #)                                                  



[facility] H[id number] 10:50:01





             Test Item    Value        Reference Range Interpretation Comments

 

             Eosinophils (test code = 2.0          See_Comment                [A

utomated message] The



             Eosinophils)                                        system which ge

nerated



                                                                 this result tra

nsmitted



                                                                 reference range

: <=4.0.



                                                                 The reference r

zhen was



                                                                 not used to int

erpret



                                                                 this result as



                                                                 normal/abnormal

.



[facility] H[id number] 10:50:01





             Test Item    Value        Reference Range Interpretation Comments

 

             Segs (test code = Segs) 67.4         45.0-75.0                 



[facility] H[id number] 10:50:01





             Test Item    Value        Reference Range Interpretation Comments

 

             Lymphocytes (test code = Lymphocytes) 19.9         20.0-40.0       

          



[facility] H[id number] 10:50:01





             Test Item    Value        Reference Range Interpretation Comments

 

             Basophils (test code = 1.0          See_Comment                [Aut

omated message] The



             Basophils)                                          system which ge

nerated



                                                                 this result tra

nsmitted



                                                                 reference range

: <=1.0.



                                                                 The reference r

zhen was



                                                                 not used to int

erpret



                                                                 this result as



                                                                 normal/abnormal

.



[facility] H[id number] 10:50:01





             Test Item    Value        Reference Range Interpretation Comments

 

             Monocytes (test code = Monocytes) 9.7          2.0-12.0            

      



[facility]2018-11-08 10:50:01





             Test Item    Value        Reference Range Interpretation Comments

 

             eGFR (test code = eGFR) 133                                    



[facility]2018-11-08 10:50:01





             Test Item    Value        Reference Range Interpretation Comments

 

             Calcium Lvl (test code = Calcium Lvl) 9.4          8.5-10.5        

          



[facility]2018-11-08 10:50:01





             Test Item    Value        Reference Range Interpretation Comments

 

             CO2 (test code = CO2) 28           24-32                     



[facility]2018-11-08 10:50:01





             Test Item    Value        Reference Range Interpretation Comments

 

             BUN (test code = BUN) 14           7-22                      



[facility]2018-11-08 10:50:01





             Test Item    Value        Reference Range Interpretation Comments

 

             Glucose Lvl (test code = Glucose Lvl) 89           70-99           

          



[facility]2018-11-08 10:50:01





             Test Item    Value        Reference Range Interpretation Comments

 

             Chloride Lvl (test code = Chloride Lvl) 104                  

            



[facility]2018-11-08 10:50:01





             Test Item    Value        Reference Range Interpretation Comments

 

             Potassium Lvl (test code = Potassium 4.2          3.5-5.1          

         



             Lvl)                                                



[facility]2018-11-08 10:50:01





             Test Item    Value        Reference Range Interpretation Comments

 

             Sodium Lvl (test code = Sodium Lvl) 137          135-145           

        



[facility]2018-11-08 10:50:01





             Test Item    Value        Reference Range Interpretation Comments

 

             Creatinine Lvl (test code = Creatinine 0.85         0.50-1.40      

           



             Lvl)                                                



[facility]2018-11-08 10:50:01





             Test Item    Value        Reference Range Interpretation Comments

 

             AGAP (test code = AGAP) 9.2          10.0-20.0                 



[place] [id number] 10:50:01





             Test Item    Value        Reference Range Interpretation Comments

 

             ACT (TEG) Rapid (test code = ACT (TEG) 136 s                 

           



             Rapid)                                              



[facility] H[id number] 10:50:01





             Test Item    Value        Reference Range Interpretation Comments

 

             Split Point Rapid (test code = Split 0.6 min                       

         



             Point Rapid)                                        



[facility] [facility]60[facility]52[facility]68 10:50[facility]01





             Test Item    Value        Reference Range Interpretation Comments

 

             R-time Rapid (test code = R-time 0.9 min      0.4-0.7              

     



             Rapid)                                              



[facility] HospitalHvrbjfcGDAFIMMFIK6880-69-83 10:50:01





             Test Item    Value        Reference Range Interpretation Comments

 

             K-time Rapid (test code = K-time 1.4 min      0.6-2.3              

     



             Rapid)                                              



[facility] HospitalDzqsfolXZBQMPLBDQ0858-94-08 10:50:01





             Test Item    Value        Reference Range Interpretation Comments

 

             Angle Rapid (test code = Angle 71 degrees   64-80                  

   



             Rapid)                                              



[facility] HospitalVklqklaVBFPRLJIWI5689-74-16 10:50:01





             Test Item    Value        Reference Range Interpretation Comments

 

             G-value Rapid (test code = G-value 12.7         5.0-11.6           

       



             Rapid)                                              



[facility] HospitalRqvvqtfACVGJUZJSN6414-54-88 10:50:01





             Test Item    Value        Reference Range Interpretation Comments

 

             Max Amplitude Rapid (test code = Max 72 mm        52-71            

         



             Amplitude Rapid)                                        



[facility] HospitalGdabhtjMJFQNRZGSF3982-05-78 10:50:01





             Test Item    Value        Reference Range Interpretation Comments

 

             Estimated % Lysis Rapid 0.1          See_Comment                [Au

tomated message] The



             (test code = Estimated                                        syste

m which generated



             % Lysis Rapid)                                        this result t

ransmitted



                                                                 reference range

: <=7.5.



                                                                 The reference r

zhen was



                                                                 not used to int

erpret



                                                                 this result as



                                                                 normal/abnormal

.



[facility] HospitalSxktrbmERFQDPQYMT3912-02-28 10:50:01





             Test Item    Value        Reference Range Interpretation Comments

 

             Platelet (test code = Platelet) 367          133-450               

    



[facility] HospitalKztfsqtJKEDBPPKTS2755-06-64 10:50:01





             Test Item    Value        Reference Range Interpretation Comments

 

             MPV (test code = MPV) 7.8          7.4-10.4                  



[facility] HospitalTywnczpQTCLPHMFIC9060-52-75 10:50:01





             Test Item    Value        Reference Range Interpretation Comments

 

             MCH (test code = MCH) 27.4 pg      27.0-31.0                 



[facility] HospitalMbtfqshUJZFQTEGQD6928-97-84 10:50:01





             Test Item    Value        Reference Range Interpretation Comments

 

             MCV (test code = MCV) 80.7         80.0-94.0                 



[facility]2018-05-08 05:42:00





             Test Item    Value        Reference Range Interpretation Comments

 

             B/C Ratio (test code = B/C Ratio) 17 1         6-25                

      



[facility]2018-05-08 05:42:00





             Test Item    Value        Reference Range Interpretation Comments

 

             Globulin (test code = Globulin) 4.3          2.7-4.2               

    



[facility]2018-05-08 05:42:00





             Test Item    Value        Reference Range Interpretation Comments

 

             A/G Ratio (test code = A/G Ratio) 0.7 1        0.7-1.6             

      



[facility]2018-05-08 05:42:00





             Test Item    Value        Reference Range Interpretation Comments

 

             AGAP (test code = AGAP) 14.4         10.0-20.0                 



[facility]2018-05-08 05:42:00





             Test Item    Value        Reference Range Interpretation Comments

 

             eGFR (test code = eGFR) 113                                    



[facility]2018-05-08 05:42:00





             Test Item    Value        Reference Range Interpretation Comments

 

             Alk Phos (test code = Alk Phos) 76                           

    



[facility]2018-05-08 05:42:00





             Test Item    Value        Reference Range Interpretation Comments

 

             ALT (test code = ALT) 35           See_Comment                [Auto

mated message] The



                                                                 system which ge

nerated this



                                                                 result transmit

huma



                                                                 reference range

: <=65. The



                                                                 reference range

 was not



                                                                 used to interpr

et this



                                                                 result as zayda

l/abnormal.



[facility]2018-05-08 05:42:00





             Test Item    Value        Reference Range Interpretation Comments

 

             Albumin Lvl (test code = Albumin Lvl) 2.8          3.5-5.0         

          



[facility]2018-05-08 05:42:00





             Test Item    Value        Reference Range Interpretation Comments

 

             Total Protein (test code = Total 7.1          6.4-8.4              

     



             Protein)                                            



[facility]2018-05-08 05:42:00





             Test Item    Value        Reference Range Interpretation Comments

 

             Calcium Lvl (test code = Calcium Lvl) 8.7          8.5-10.5        

          



[facility]2018-05-08 05:42:00





             Test Item    Value        Reference Range Interpretation Comments

 

             AST (test code = AST) 18           See_Comment                [Auto

mated message] The



                                                                 system which ge

nerated this



                                                                 result transmit

huma



                                                                 reference range

: <=37. The



                                                                 reference range

 was not



                                                                 used to interpr

et this



                                                                 result as zayda

l/abnormal.



[facility]2018-05-08 05:42:00





             Test Item    Value        Reference Range Interpretation Comments

 

             Bili Total (test code = Bili Total) 0.3          0.2-1.3           

        



[facility]2018-05-08 05:42:00





             Test Item    Value        Reference Range Interpretation Comments

 

             Potassium Lvl (test code = Potassium 4.4          3.5-5.1          

         



             Lvl)                                                



[facility]2018-05-08 05:42:00





             Test Item    Value        Reference Range Interpretation Comments

 

             Chloride Lvl (test code = Chloride Lvl) 109                  

            



[facility]2018-05-08 05:42:00





             Test Item    Value        Reference Range Interpretation Comments

 

             CO2 (test code = CO2) 23           24-32                     



[facility]2018-05-08 05:42:00





             Test Item    Value        Reference Range Interpretation Comments

 

             Glucose Lvl (test code = Glucose Lvl) 114          70-99           

          



[facility]2018-05-08 05:42:00





             Test Item    Value        Reference Range Interpretation Comments

 

             Creatinine Lvl (test code = Creatinine 1.01         0.50-1.40      

           



             Lvl)                                                



[facility]2018-05-08 05:42:00





             Test Item    Value        Reference Range Interpretation Comments

 

             BUN (test code = BUN) 17           7-22                      



[facility]2018-05-08 05:42:00





             Test Item    Value        Reference Range Interpretation Comments

 

             Sodium Lvl (test code = Sodium Lvl) 142          135-145           

        



[facility] HospitalVbauqepNVWEFBWDQH2521-27-97 05:42:00





             Test Item    Value        Reference Range Interpretation Comments

 

             Basophils (test code = 0.6          See_Comment                [Aut

omated message] The



             Basophils)                                          system which ge

nerated



                                                                 this result tra

nsmitted



                                                                 reference range

: <=1.0.



                                                                 The reference r

zhen was



                                                                 not used to int

erpret



                                                                 this result as



                                                                 normal/abnormal

.



[facility] HospitalShjoblvTEUAFHXCJT4031-56-96 05:42:00





             Test Item    Value        Reference Range Interpretation Comments

 

             Segs-Bands # (test code = Segs-Bands #) 4.8          1.5-8.1       

            



[facility] HospitalJonujriGVNCJZKXEJ4332-26-54 05:42:00





             Test Item    Value        Reference Range Interpretation Comments

 

             Monocytes # (test code 0.7          See_Comment                [Aut

omated message] The



             = Monocytes #)                                        system which 

generated



                                                                 this result tra

nsmitted



                                                                 reference range

: <=0.8.



                                                                 The reference r

zhen was



                                                                 not used to int

erpret



                                                                 this result as



                                                                 normal/abnormal

.



[facility] HospitalLyzblmzOMMTAHZCJS8293-58-89 05:42:00





             Test Item    Value        Reference Range Interpretation Comments

 

             Lymphocytes # (test code = Lymphocytes 1.9          1.0-5.5        

           



             #)                                                  



[facility] HospitalCivqqwhPJVANLQATT0874-20-74 05:42:00





             Test Item    Value        Reference Range Interpretation Comments

 

             Monocytes (test code = Monocytes) 9.4          2.0-12.0            

      



[facility] HospitalOikjmcbBXSCJDKMOM4004-48-11 05:42:00





             Test Item    Value        Reference Range Interpretation Comments

 

             Eosinophils # (test code 0.2          See_Comment                [A

utomated message] The



             = Eosinophils #)                                        system whic

h generated



                                                                 this result tra

nsmitted



                                                                 reference range

: <=0.5.



                                                                 The reference r

zhen was



                                                                 not used to int

erpret



                                                                 this result as



                                                                 normal/abnormal

.



[facility] HospitalNenfgvzZTJBOSRVWT0331-33-74 05:42:00





             Test Item    Value        Reference Range Interpretation Comments

 

             Eosinophils (test code = 2.9          See_Comment                [A

utomated message] The



             Eosinophils)                                        system which ge

nerated



                                                                 this result tra

nsmitted



                                                                 reference range

: <=4.0.



                                                                 The reference r

zhen was



                                                                 not used to int

erpret



                                                                 this result as



                                                                 normal/abnormal

.



[facility] HospitalVralptzWECKBIJRWX6525-28-37 05:42:00





             Test Item    Value        Reference Range Interpretation Comments

 

             Segs (test code = Segs) 62.2         45.0-75.0                 



[facility] HospitalOiancxqVPDMOISWVY4250-33-60 05:42:00





             Test Item    Value        Reference Range Interpretation Comments

 

             Lymphocytes (test code = Lymphocytes) 24.9         20.0-40.0       

          



[facility] HospitalZscftjtKRMZMCOXVK0385-22-23 05:42:00





             Test Item    Value        Reference Range Interpretation Comments

 

             MCH (test code = MCH) 27.5 pg      27.0-31.0                 



[facility] HospitalOicplbiABMHJUGSCU2000-82-61 05:42:00





             Test Item    Value        Reference Range Interpretation Comments

 

             MCV (test code = MCV) 85.3         80.0-94.0                 



[facility] HospitalXijoukpECBKOWMRFS3821-38-55 05:42:00





             Test Item    Value        Reference Range Interpretation Comments

 

             Hct (test code = Hct) 43.9         42.0-54.0                 



[facility] HospitalIrardbkHVHMBJQJSE8543-70-24 05:42:00





             Test Item    Value        Reference Range Interpretation Comments

 

             Hgb (test code = Hgb) 14.2         14.0-18.0                 



[facility] HospitalRgbefnfCIHPRBXYSI4269-63-92 05:42:00





             Test Item    Value        Reference Range Interpretation Comments

 

             WBC (test code = WBC) 7.7          3.7-10.4                  



[facility] HospitalRpidccdFGJUIBOXKB4161-74-43 05:42:00





             Test Item    Value        Reference Range Interpretation Comments

 

             RBC (test code = RBC) 5.15         4.70-6.10                 



[facility] HospitalFnaaqipJJHIQSAOUT0696-61-60 05:42:00





             Test Item    Value        Reference Range Interpretation Comments

 

             MPV (test code = MPV) 8.4          7.4-10.4                  



[facility] HospitalApmtdglUMCIYYZTPF7748-06-43 05:42:00





             Test Item    Value        Reference Range Interpretation Comments

 

             MCHC (test code = MCHC) 32.3         32.0-36.0                 



[facility] HospitalIaztljeSYJLVQLDTB3308-60-89 05:42:00





             Test Item    Value        Reference Range Interpretation Comments

 

             RDW (test code = RDW) 17.3         11.5-14.5                 



[facility]buIDMQYMFVFD1663[facility]51-94 [facility]





             Test Item    Value        Reference Range Interpretation Comments

 

             Platelet (test code = Platelet) 317          133-450               

    



[facility]2018-05-07 09:36:00





             Test Item    Value        Reference Range Interpretation Comments

 

             Globulin (test code = Globulin) 4.4          2.7-4.2               

    



[facility]2018-05-07 09:36:00





             Test Item    Value        Reference Range Interpretation Comments

 

             A/G Ratio (test code = A/G Ratio) 0.6 1        0.7-1.6             

      



[place] 724658-05-07 09:36:00





             Test Item    Value        Reference Range Interpretation Comments

 

             B/C Ratio (test code = B/C Ratio) 17 1         6-25                

      



[facility]2018-05-07 09:36:00





             Test Item    Value        Reference Range Interpretation Comments

 

             AGAP (test code = AGAP) 11.3         10.0-20.0                 



[facility]2018-05-07 09:36:00





             Test Item    Value        Reference Range Interpretation Comments

 

             eGFR (test code = eGFR) 134                                    



[facility]2018-05-07 09:36:00





             Test Item    Value        Reference Range Interpretation Comments

 

             Creatinine Lvl (test code = Creatinine 0.84         0.50-1.40      

           



             Lvl)                                                



[facility]2018-05-07 09:36:00





             Test Item    Value        Reference Range Interpretation Comments

 

             Sodium Lvl (test code = Sodium Lvl) 142          135-145           

        



[facility]2018-05-07 09:36:00





             Test Item    Value        Reference Range Interpretation Comments

 

             Glucose Lvl (test code = Glucose Lvl) 99           70-99           

          



[facility]2018-05-07 09:36:00





             Test Item    Value        Reference Range Interpretation Comments

 

             BUN (test code = BUN) 14           7-22                      



[facility]2018-05-07 09:36:00





             Test Item    Value        Reference Range Interpretation Comments

 

             Alk Phos (test code = Alk Phos) 79                           

    



[facility]2018-05-07 09:36:00





             Test Item    Value        Reference Range Interpretation Comments

 

             Bili Total (test code = Bili Total) 0.3          0.2-1.3           

        



[facility]2018-05-07 09:36:00





             Test Item    Value        Reference Range Interpretation Comments

 

             AST (test code = AST) 14           See_Comment                [Auto

mated message] The



                                                                 system which ge

nerated this



                                                                 result transmit

huma



                                                                 reference range

: <=37. The



                                                                 reference range

 was not



                                                                 used to interpr

et this



                                                                 result as zayda

l/abnormal.



[facility]2018-05-07 09:36:00





             Test Item    Value        Reference Range Interpretation Comments

 

             ALT (test code = ALT) 43           See_Comment                [Auto

mated message] The



                                                                 system which ge

nerated this



                                                                 result transmit

huma



                                                                 reference range

: <=65. The



                                                                 reference range

 was not



                                                                 used to interpr

et this



                                                                 result as zayda

l/abnormal.



[facility]2018-05-07 09:36:00





             Test Item    Value        Reference Range Interpretation Comments

 

             Total Protein (test code = Total 7.1          6.4-8.4              

     



             Protein)                                            



[place] [id number] 09:36:00





             Test Item    Value        Reference Range Interpretation Comments

 

             Albumin Lvl (test code = Albumin Lvl) 2.7          3.5-5.0         

          



[facility]2018-05-07 09:36:00





             Test Item    Value        Reference Range Interpretation Comments

 

             Calcium Lvl (test code = Calcium Lvl) 9.2          8.5-10.5        

          



[facility]2018-05-07 09:36:00





             Test Item    Value        Reference Range Interpretation Comments

 

             CO2 (test code = CO2) 21           24-32                     



[facility]2018-05-07 09:36:00





             Test Item    Value        Reference Range Interpretation Comments

 

             Potassium Lvl (test code = Potassium 4.3          3.5-5.1          

         



             Lvl)                                                



[facility]2018-05-07 09:36:00





             Test Item    Value        Reference Range Interpretation Comments

 

             Chloride Lvl (test code = Chloride Lvl) 114                  

            



[facility] HospitalMolldabGQOAKUWGJM0306-84-61 09:36:00





             Test Item    Value        Reference Range Interpretation Comments

 

             MCHC (test code = MCHC) 32.5         32.0-36.0                 



[facility] HospitalByoismcUZTLPRCFGB9045-01-79 09:36:00





             Test Item    Value        Reference Range Interpretation Comments

 

             RDW (test code = RDW) 17.5         11.5-14.5                 



[facility] HospitalXrlqsdxREFCSNXHIC7095-41-24 09:36:00





             Test Item    Value        Reference Range Interpretation Comments

 

             Platelet (test code = Platelet) 400          133-450               

    



[facility] HospitalTlurzptUBQAHDIROK7328-37-37 09:36:00





             Test Item    Value        Reference Range Interpretation Comments

 

             MPV (test code = MPV) 8.5          7.4-10.4                  



[facility] HospitalYxlxnhzMMZWUAUXSU2208-64-99 09:36:00





             Test Item    Value        Reference Range Interpretation Comments

 

             WBC (test code = WBC) 6.6          3.7-10.4                  



[facility] HospitalAbywavtTNNMRRMDUW1998-30-91 09:36:00





             Test Item    Value        Reference Range Interpretation Comments

 

             RBC (test code = RBC) 5.17         4.70-6.10                 



[facility] HospitalOjsgxsxXLKMBYZHSF7810-51-41 09:36:00





             Test Item    Value        Reference Range Interpretation Comments

 

             MCV (test code = MCV) 86.1         80.0-94.0                 



[facility] HospitalAumsqbfNAKTMDFTCN4218-47-17 09:36:00





             Test Item    Value        Reference Range Interpretation Comments

 

             Hct (test code = Hct) 44.5         42.0-54.0                 



[facility] HospitalDcjshgrJHIVAWYVYM0808-92-21 09:36:00





             Test Item    Value        Reference Range Interpretation Comments

 

             MCH (test code = MCH) 28.0 pg      27.0-31.0                 



[facility] HospitalBklejauFYUPYDOJGO9068-96-84 09:36:00





             Test Item    Value        Reference Range Interpretation Comments

 

             Hgb (test code = Hgb) 14.5         14.0-18.0                 



[facility] HospitalKvubvdmKGHILIAWNC9945-54-72 09:36:00





             Test Item    Value        Reference Range Interpretation Comments

 

             Lymphocytes # (test code = Lymphocytes 1.7          1.0-5.5        

           



             #)                                                  



[facility]wTZMWNTBIFA6093-97-13 09:36:00





             Test Item    Value        Reference Range Interpretation Comments

 

             Monocytes # (test code 0.7          See_Comment                [Aut

omated message] The



             = Monocytes #)                                        system which 

generated



                                                                 this result tra

nsmitted



                                                                 reference range

: <=0.8.



                                                                 The reference r

zhen was



                                                                 not used to int

erpret



                                                                 this result as



                                                                 normal/abnormal

.



[facility] HospitalDzdahgzKEVBOEQQLV6122-30-71 09:36:00





             Test Item    Value        Reference Range Interpretation Comments

 

             Eosinophils # (test code 0.2          See_Comment                [A

utomated message] The



             = Eosinophils #)                                        system whic

h generated



                                                                 this result tra

nsmitted



                                                                 reference range

: <=0.5.



                                                                 The reference r

zhen was



                                                                 not used to int

erpret



                                                                 this result as



                                                                 normal/abnormal

.



[facility] HospitalQchffhbINENIPHLNZ5932-76-56 09:36:00





             Test Item    Value        Reference Range Interpretation Comments

 

             Lymphocytes (test code = Lymphocytes) 26.1         20.0-40.0       

          



[facility] HospitalMzyapmfINNBARRMAV1322-80-05 09:36:00





             Test Item    Value        Reference Range Interpretation Comments

 

             Segs (test code = Segs) 59.3         45.0-75.0                 



[facility] HospitalTecpkxfSGSLIKDEUI4151-33-84 09:36:00





             Test Item    Value        Reference Range Interpretation Comments

 

             Basophils (test code = 0.8          See_Comment                [Aut

omated message] The



             Basophils)                                          system which ge

nerated



                                                                 this result tra

nsmitted



                                                                 reference range

: <=1.0.



                                                                 The reference r

zhen was



                                                                 not used to int

erpret



                                                                 this result as



                                                                 normal/abnormal

.



[facility] HospitalNejydcaQGXGWJRQQU3059-17-51 09:36:00





             Test Item    Value        Reference Range Interpretation Comments

 

             Monocytes (test code = Monocytes) 10.5         2.0-12.0            

      



[facility] HospitalWodufrbSPISIDGAZG3718-16-76 09:36:00





             Test Item    Value        Reference Range Interpretation Comments

 

             Eosinophils (test code = 3.3          See_Comment                [A

utomated message] The



             Eosinophils)                                        system which ge

nerated



                                                                 this result tra

nsmitted



                                                                 reference range

: <=4.0.



                                                                 The reference r

zhen was



                                                                 not used to int

erpret



                                                                 this result as



                                                                 normal/abnormal

.



[facility] HospitalRzyrwavTABAIQBCMN2994-33-46 09:36:00





             Test Item    Value        Reference Range Interpretation Comments

 

             Segs-Bands # (test code = Segs-Bands #) 3.9          1.5-8.1       

            



[place] [id number] 21:02:00





             Test Item    Value        Reference Range Interpretation Comments

 

             Vanco Tr TND (test code = Vanco Tr 15:30pm                         

       



             TND)                                                



[place] [id number] 21:02:00





             Test Item    Value        Reference Range Interpretation Comments

 

             Vanco Tr (test code = Vanco Tr) 9.1                                

    



[facility]2018-05-06 07:07:00





             Test Item    Value        Reference Range Interpretation Comments

 

             Glucose Lvl (test code = Glucose Lvl) 74           70-99           

          



[facility]2018-05-06 07:07:00





             Test Item    Value        Reference Range Interpretation Comments

 

             BUN (test code = BUN) 12           7-22                      



[facility]2018-05-06 07:07:00





             Test Item    Value        Reference Range Interpretation Comments

 

             Creatinine Lvl (test code = Creatinine 0.75         0.50-1.40      

           



             Lvl)                                                



[place] 724658-05-06 07:07:00





             Test Item    Value        Reference Range Interpretation Comments

 

             eGFR (test code = eGFR) 140                                    



[facility]2018-05-06 07:07:00





             Test Item    Value        Reference Range Interpretation Comments

 

             Albumin Lvl (test code = Albumin Lvl) 2.9          3.5-5.0         

          



[place] 724658-05-06 07:07:00





             Test Item    Value        Reference Range Interpretation Comments

 

             Globulin (test code = Globulin) 4.4          2.7-4.2               

    



[place] 724658-05-06 07:07:00





             Test Item    Value        Reference Range Interpretation Comments

 

             A/G Ratio (test code = A/G Ratio) 0.7 1        0.7-1.6             

      



[place] 724658-05-06 07:07:00





             Test Item    Value        Reference Range Interpretation Comments

 

             Bili Total (test code = Bili Total) 0.4          0.2-1.3           

        



[place] 724658-05-06 07:07:00





             Test Item    Value        Reference Range Interpretation Comments

 

             Alk Phos (test code = Alk Phos) 71                           

    



[place] 724658-05-06 07:07:00





             Test Item    Value        Reference Range Interpretation Comments

 

             AST (test code = AST) 15           See_Comment                [Auto

mated message] The



                                                                 system which ge

nerated this



                                                                 result transmit

huma



                                                                 reference range

: <=37. The



                                                                 reference range

 was not



                                                                 used to interpr

et this



                                                                 result as zayda

l/abnormal.



[place] 724658-05-06 07:07:00





             Test Item    Value        Reference Range Interpretation Comments

 

             ALT (test code = ALT) 28           See_Comment                [Auto

mated message] The



                                                                 system which ge

nerated this



                                                                 result transmit

huma



                                                                 reference range

: <=65. The



                                                                 reference range

 was not



                                                                 used to interpr

et this



                                                                 result as zayda

l/abnormal.



[place] 724658-05-06 07:07:00





             Test Item    Value        Reference Range Interpretation Comments

 

             Potassium Lvl (test code = Potassium 4.4          3.5-5.1          

         



             Lvl)                                                



[place] 724658-05-06 07:07:00





             Test Item    Value        Reference Range Interpretation Comments

 

             Sodium Lvl (test code = Sodium Lvl) 147          135-145           

        



[place] 724658-05-06 07:07:00





             Test Item    Value        Reference Range Interpretation Comments

 

             CO2 (test code = CO2) 25           24-32                     



[facility]2018-05-06 07:07:00





             Test Item    Value        Reference Range Interpretation Comments

 

             Chloride Lvl (test code = Chloride Lvl) 114                  

            



[facility]2018-05-06 07:07:00





             Test Item    Value        Reference Range Interpretation Comments

 

             B/C Ratio (test code = B/C Ratio) 16 1         6-25                

      



[place] 724658-05-06 07:07:00





             Test Item    Value        Reference Range Interpretation Comments

 

             Calcium Lvl (test code = Calcium Lvl) 8.7          8.5-10.5        

          



[facility]2018-05-06 07:07:00





             Test Item    Value        Reference Range Interpretation Comments

 

             AGAP (test code = AGAP) 12.4         10.0-20.0                 



[facility]2018-05-06 07:07:00





             Test Item    Value        Reference Range Interpretation Comments

 

             Total Protein (test code = Total 7.3          6.4-8.4              

     



             Protein)                                            



[facility] HospitalLwlkdixNAUSLWRQYK0847-05-50 07:07:00





             Test Item    Value        Reference Range Interpretation Comments

 

             Platelet (test code = Platelet) 345          133-450               

    



[facility] HospitalGtgvvpzNVRCUDSQGF3759-41-74 07:07:00





             Test Item    Value        Reference Range Interpretation Comments

 

             MPV (test code = MPV) 8.7          7.4-10.4                  



[facility] HospitalHhfstqwHULTBSDVAL9268-90-23 07:07:00





             Test Item    Value        Reference Range Interpretation Comments

 

             WBC (test code = WBC) 6.9          3.7-10.4                  



[facility] HospitalOktedldVWQCQSANBO0134-53-20 07:07:00





             Test Item    Value        Reference Range Interpretation Comments

 

             RBC (test code = RBC) 5.30         4.70-6.10                 



[facility] HospitalOyknklcUYMGDAAWFA3689-82-98 07:07:00





             Test Item    Value        Reference Range Interpretation Comments

 

             MCHC (test code = MCHC) 32.8         32.0-36.0                 



[facility] HospitalQnelxslUGNHXBYPKZ9554-20-11 07:07:00





             Test Item    Value        Reference Range Interpretation Comments

 

             MCH (test code = MCH) 27.9 pg      27.0-31.0                 



[facility] HospitalTcqwmfgAMLMXCLJPF0751-55-46 07:07:00





             Test Item    Value        Reference Range Interpretation Comments

 

             Hgb (test code = Hgb) 14.8         14.0-18.0                 



[facility] HospitalMynadejUGIEVOAPOO5228-92-63 07:07:00





             Test Item    Value        Reference Range Interpretation Comments

 

             MCV (test code = MCV) 85.0         80.0-94.0                 



[facility] HospitalRvdlmsfIRFLJAILJP0447-96-29 07:07:00





             Test Item    Value        Reference Range Interpretation Comments

 

             Hct (test code = Hct) 45.1         42.0-54.0                 



[facility] HospitalZcsicplSXTOXJCDCR9616-87-13 07:07:00





             Test Item    Value        Reference Range Interpretation Comments

 

             RDW (test code = RDW) 17.5         11.5-14.5                 



[facility] HospitalDsenjhyOUWDNDXCFP2333-42-43 07:07:00





             Test Item    Value        Reference Range Interpretation Comments

 

             Eosinophils # (test code 0.2          See_Comment                [A

utomated message] The



             = Eosinophils #)                                        system whic

h generated



                                                                 this result tra

nsmitted



                                                                 reference range

: <=0.5.



                                                                 The reference r

zhen was



                                                                 not used to int

erpret



                                                                 this result as



                                                                 normal/abnormal

.



[facility] HospitalZhwcmioYOGOUXGQMF1156-71-46 07:07:00





             Test Item    Value        Reference Range Interpretation Comments

 

             Lymphocytes # (test code = Lymphocytes 2.0          1.0-5.5        

           



             #)                                                  



[facility] HospitalEpugjrzEKVNGBJQAD5537-45-52 07:07:00





             Test Item    Value        Reference Range Interpretation Comments

 

             Monocytes # (test code 0.7          See_Comment                [Aut

omated message] The



             = Monocytes #)                                        system which 

generated



                                                                 this result tra

nsmitted



                                                                 reference range

: <=0.8.



                                                                 The reference r

zhen was



                                                                 not used to int

erpret



                                                                 this result as



                                                                 normal/abnormal

.



[facility] HospitalCbediqeGUQQESPVWT9825-61-58 07:07:00





             Test Item    Value        Reference Range Interpretation Comments

 

             Eosinophils (test code = 2.4          See_Comment                [A

utomated message] The



             Eosinophils)                                        system which ge

nerated



                                                                 this result tra

nsmitted



                                                                 reference range

: <=4.0.



                                                                 The reference r

zhen was



                                                                 not used to int

erpret



                                                                 this result as



                                                                 normal/abnormal

.



[facility] HospitalDzlewdcNSAPXCUPZZ6944-39-41 07:07:00





             Test Item    Value        Reference Range Interpretation Comments

 

             Basophils (test code = 0.6          See_Comment                [Aut

omated message] The



             Basophils)                                          system which ge

nerated



                                                                 this result tra

nsmitted



                                                                 reference range

: <=1.0.



                                                                 The reference r

zhen was



                                                                 not used to int

erpret



                                                                 this result as



                                                                 normal/abnormal

.



[facility] HospitalYhqlcqkILPUEYIPOD5281-86-31 07:07:00





             Test Item    Value        Reference Range Interpretation Comments

 

             Segs-Bands # (test code = Segs-Bands #) 4.0          1.5-8.1       

            



[facility] HospitalOnjzignOUUBDKNBMX7735-17-01 07:07:00





             Test Item    Value        Reference Range Interpretation Comments

 

             Monocytes (test code = Monocytes) 10.4         2.0-12.0            

      



[facility] HospitalVwatmhfPNMUAUBBTG7504-94-85 07:07:00





             Test Item    Value        Reference Range Interpretation Comments

 

             RBC Morph (test code = Normal (18 2:07 AM)                     

      



             RBC Morph)                                          



[facility] HospitalKdsnityVSWLYRYGMT3798-30-65 07:07:00





             Test Item    Value        Reference Range Interpretation Comments

 

             Segs (test code = Segs) 58.1         45.0-75.0                 



[facility] HospitalMjicmgdCEUWUUAULU7285-26-45 07:07:00





             Test Item    Value        Reference Range Interpretation Comments

 

             Plt Morph (test code = Normal (18 2:07 AM)                     

      



             Plt Morph)                                          



[facility] HospitalDjyneopBPCQOHSMMD9751-49-75 07:07:00





             Test Item    Value        Reference Range Interpretation Comments

 

             Lymphocytes (test code = Lymphocytes) 28.5         20.0-40.0       

          



[facility]DkdyfkuUPJTYDJBEK3660[facility]





             Test Item    Value        Reference Range Interpretation Comments

 

             Basophils # (test code 0.1          See_Comment                [Aut

omated message] The



             = Basophils #)                                        system which 

generated



                                                                 this result tra

nsmitted



                                                                 reference range

: <=0.2.



                                                                 The reference r

zhen was



                                                                 not used to int

erpret



                                                                 this result as



                                                                 normal/abnormal

.



[facility]ellandTnihsnmGMBWEGPHZU0554-42-56 16:07:00





             Test Item    Value        Reference Range Interpretation Comments

 

             Polychrom (test code = Moderate *ABN*(18                       

    



             Polychrom)   11:07 AM)                              



[facility] SspooilASBLXZNAGP0380-[facility] 06:43:00





             Test Item    Value        Reference Range Interpretation Comments

 

             Vanco Tr TND (test code = Vanco Tr TND) *                          

            



[facility] FhdwscnGVBETGWZQE1928-24-21 06:43:00





             Test Item    Value        Reference Range Interpretation Comments

 

             Vanco Tr (test code = Vanco Tr) 22.3                               

    



[facility]2018-05-04 11:45:00





             Test Item    Value        Reference Range Interpretation Comments

 

             Magnesium Lvl (test code = Magnesium 2.3          1.8-2.4          

         



             Lvl)                                                



[facility]2018-05-04 11:45:00





             Test Item    Value        Reference Range Interpretation Comments

 

             Phosphorus (test code = Phosphorus) 3.5          2.5-4.5           

        



[facility] HospitalXetonslDMUYXYKHSQ5342-40-58 11:45:00





             Test Item    Value        Reference Range Interpretation Comments

 

             PT (test code = PT) 14.0 s       12.0-14.7                 



[facility] HospitalQrbmbvzASCOAFJWJP6281-34-30 11:45:00





             Test Item    Value        Reference Range Interpretation Comments

 

             PTT (test code = PTT) 37.6 s       22.9-35.8                 



[facility] HospitalImeiwvrJOJTFFIPKR9795-40-48 11:45:00





             Test Item    Value        Reference Range Interpretation Comments

 

             INR (test code = INR) 1.08 1       0.85-1.17                 



[facility]qqbzaOTJXKJXZWX7143-01-56 11:45:00





             Test Item    Value        Reference Range Interpretation Comments

 

             C-REACTIVE PROTEIN (test code = 13.1                               

    



             C-REACTIVE PROTEIN)                                        



[facility] CenterZrrdkrxEQSLMCZWWP4367-39-64 11:45:00





             Test Item    Value        Reference Range Interpretation Comments

 

             Prealbumin (test code = Prealbumin) 25.2         18.0-45.0         

        



[facility]2018-05-04 03:06:00





             Test Item    Value        Reference Range Interpretation Comments

 

             Lactic Acid Lvl (test code = Lactic 0.9          0.5-2.2           

        



             Acid Lvl)                                           



[facility] HospitalYavhohzXWDYYNXVSX3257-30-32 03:06:00





             Test Item    Value        Reference Range Interpretation Comments

 

             Sed Rate (test code = 5            See_Comment                [Auto

mated message] The



             Sed Rate)                                           system which ge

nerated this



                                                                 result transmit

huma



                                                                 reference range

: <=15. The



                                                                 reference range

 was not



                                                                 used to interpr

et this



                                                                 result as zayda

l/abnormal.



[facility] CenterErymaynNASTRFYHHM2204-36-31 03:06:00





             Test Item    Value        Reference Range Interpretation Comments

 

             C-REACTIVE PROTEIN (test code = 15.8                               

    



             C-REACTIVE PROTEIN)                                        



[facility] HospitalKubwtohPANEWJGEJP3786-72-78 00:44:00





             Test Item    Value        Reference Range Interpretation Comments

 

             PT (test code = PT) 13.0 s       12.0-14.7                 



[facility] HospitalQiuttejTPJMJPVBGG3382-09-76 00:44:00





             Test Item    Value        Reference Range Interpretation Comments

 

             INR (test code = INR) 0.98 1       0.85-1.17                 



[facility]andWiitvyzICGMQYFPJG8693-35-95 00:44:00





             Test Item    Value        Reference Range Interpretation Comments

 

             PTT (test code = PTT) 33.2 s       22.9-35.8                 



[facility] FOMAWFJ0353-80-78 23:51:00





             Test Item    Value        Reference Range Interpretation Comments

 

             Antibody Scrn (test Negative (5/3/18 6:51                          

 



             code = Antibody Scrn) PM)                                    



[facility] GIKLCVZ6291-12-31 23:51:00





             Test Item    Value        Reference Range Interpretation Comments

 

             ABO/Rh (test code = ABO/Rh) AB POS                                 



[facility] AND [id number] 23:35:00





             Test Item    Value        Reference Range Interpretation Comments

 

             UA Urobilinogen (test code = UA 0.2          0.1-1.0               

    



             Urobilinogen)                                        



[facility] AND [id number] 23:35:00





             Test Item    Value        Reference Range Interpretation Comments

 

             UA Nitrite (test code Negative (5/3/18 6:35                        

   



             = UA Nitrite) PM)                                    



[facility] AND [id number] 23:35:00





             Test Item    Value        Reference Range Interpretation Comments

 

             UA Glucose (test code Negative (5/3/18 6:35                        

   



             = UA Glucose) PM)                                    



[facility] AND [id number] 23:35:00





             Test Item    Value        Reference Range Interpretation Comments

 

             UA Ketones (test code Negative *NA*(5/3/18                         

  



             = UA Ketones) 6:35 PM)                               



[facility] AND [id number] 23:35:00





             Test Item    Value        Reference Range Interpretation Comments

 

             UA Bili (test code = Negative *NA*(5/3/18                          

 



             UA Bili)     6:35 PM)                               



[facility] AND [id number] 23:35:00





             Test Item    Value        Reference Range Interpretation Comments

 

             UA Blood (test code = Trace *ABN*(5/3/18                           



             UA Blood)    6:35 PM)                               



[facility] AND [id number] 23:35:00





             Test Item    Value        Reference Range Interpretation Comments

 

             UA Leuk Est (test code Small *ABN*(5/3/18 6:35                     

      



             = UA Leuk Est) PM)                                    



[facility] AND [id number] 23:35:00





             Test Item    Value        Reference Range Interpretation Comments

 

             UA Spec Grav (test code = UA Spec 1.020 1                          

      



             Grav)                                               



[facility] AND [id number] 23:35:00





             Test Item    Value        Reference Range Interpretation Comments

 

             UA pH (test code = UA pH) 6.0 1        5.0-8.0                   



[facility] AND [id number] 23:35:00





             Test Item    Value        Reference Range Interpretation Comments

 

             UA Color (test code = Yellow *NA*(5/3/18 6:35                      

     



             UA Color)    PM)                                    



[facility] AND [id number] 23:35:00





             Test Item    Value        Reference Range Interpretation Comments

 

             UA Protein (test code = Trace *ABN*(5/3/18                         

  



             UA Protein)  6:35 PM)                               



[facility] AND [id number] 23:35:00





             Test Item    Value        Reference Range Interpretation Comments

 

             UA Turbidity (test code Slight Cloudy (5/3/18                      

     



             = UA Turbidity) 6:35 PM)                               



[facility] AND [id number] 23:35:00





             Test Item    Value        Reference Range Interpretation Comments

 

             UA Hyal Cast 0-2 (5/3/18 6:35 See_Comment                [Automated

 message]



             (test code = UA PM)                                    The system w

[facility]



             Hyal Cast)                                          generated this 

result



                                                                 transmitted ref

erence



                                                                 range: <=2. The



                                                                 reference range

 was



                                                                 not used to int

erpret



                                                                 this result as



                                                                 normal/abnormal

.



[facility] AND [id number] 23:35:00





             Test Item    Value        Reference Range Interpretation Comments

 

             UA Bacteria (test code = UA Occasional /HPF                        

   



             Bacteria)                                           



[facility] AND [id number] 23:35:00





             Test Item    Value        Reference Range Interpretation Comments

 

             UA RBC (test code 11-20 /HPF   See_Comment                [Automate

d message] The



             = UA RBC)                                           system which ge

nerated



                                                                 this result tra

nsmitted



                                                                 reference range

: <=2. The



                                                                 reference range

 was not



                                                                 used to interpr

et this



                                                                 result as



                                                                 normal/abnormal

.



[facility] AND [id number] 23:35:00





             Test Item    Value        Reference Range Interpretation Comments

 

             UA Sq Epi (test code = UA Sq Occasional /LPF                       

    



             Epi)                                                



[facility] AND [id number] 23:35:00





             Test Item    Value        Reference Range Interpretation Comments

 

             UA WBC (test code = UA WBC)  /HPF                            



[id number]





             Test Item    Value        Reference Range Interpretation Comments

 

             Basophils # (test code 0.1          See_Comment                [Aut

omated message] The



             = Basophils #)                                        system which 

generated



                                                                 this result tra

nsmitted



                                                                 reference range

: <=0.2.



                                                                 The reference r

zhen was



                                                                 not used to int

erpret



                                                                 this result as



                                                                 normal/abnormal

.



[id number]





             Test Item    Value        Reference Range Interpretation Comments

 

             Polychrom (test code = Polychrom) Slight                           

      



[id number]:20:00





             Test Item    Value        Reference Range Interpretation Comments

 

             Plt Morph (test code = Normal (5/3/18 6:20 PM)                     

      



             Plt Morph)                                          



[facility] [id number] 16:22:00





             Test Item    Value        Reference Range Interpretation Comments

 

             CULTURE (BEAKER) (test No growth in 5 days                         

  



             code = 1095)                                        



BLOOD [id number] 16:22:00





             Test Item    Value        Reference Range Interpretation Comments

 

             CULTURE (BEAKER) (test No growth in 5 days                         

  



             code = 1095)                                        



(MANUAL DIFFERENTIAL)2017 22:29:00





             Test Item    Value        Reference Range Interpretation Comments

 

             TOTAL COUNTED (BEAKER) (test code =                                

        



             1351)                                               

 

             WBC MORPHOLOGY (BEAKER) (test code = Normal                        

         



             487)                                                

 

             PLT MORPHOLOGY (BEAKER) (test code = Normal                        

         



             486)                                                

 

             RBC MORPHOLOGY (BEAKER) (test code = Normal                        

         



             762)                                                



CBC W/PLT COUNT &amp; AUTO [id number] 22:28:00





             Test Item    Value        Reference Range Interpretation Comments

 

             WHITE BLOOD CELL COUNT (BEAKER) 7.3 K/ L     4.0-10.0              

    



             (test code = 775)                                        

 

             RED BLOOD CELL COUNT (BEAKER) 5.09 M/ L    4.20-5.80               

  



             (test code = 761)                                        

 

             HEMOGLOBIN (BEAKER) (test code = 13.0 GM/DL   13.0-16.8            

     



             410)                                                

 

             HEMATOCRIT (BEAKER) (test code = 42.3 %       40.0-50.0            

     



             411)                                                

 

             MEAN CORPUSCULAR VOLUME (BEAKER) 83.0 fL      82.0-98.0            

     



             (test code = 753)                                        

 

             MEAN CORPUSCULAR HEMOGLOBIN 25.6 pg      27.0-33.0    L            



             (BEAKER) (test code = 751)                                        

 

             MEAN CORPUSCULAR HEMOGLOBIN CONC 30.8 GM/DL   32.0-36.0    L       

     



             (BEAKER) (test code = 752)                                        

 

             RED CELL DISTRIBUTION WIDTH 18.0 %       10.3-14.2    H            



             (BEAKER) (test code = 412)                                        

 

             PLATELET COUNT (BEAKER) (test 331 K/CU MM  150-430                 

  



             code = 756)                                         

 

             MEAN PLATELET VOLUME (BEAKER) 8.5 fL       6.5-10.5                

  



             (test code = 754)                                        

 

             NUCLEATED RED BLOOD CELLS 0 /100 WBC   0-0                       



             (BEAKER) (test code = 413)                                        

 

             NEUTROPHILS RELATIVE PERCENT 65 %                                  

 



             (BEAKER) (test code = 429)                                        

 

             LYMPHOCYTES RELATIVE PERCENT 23 %                                  

 



             (BEAKER) (test code = 430)                                        

 

             MONOCYTES RELATIVE PERCENT 8 %                                    



             (BEAKER) (test code = 431)                                        

 

             EOSINOPHILS RELATIVE PERCENT 3 %                                   

 



             (BEAKER) (test code = 432)                                        

 

             BASOPHILS RELATIVE PERCENT 1 %                                    



             (BEAKER) (test code = 437)                                        

 

             NEUTROPHILS ABSOLUTE COUNT 4.75 K/ L    1.80-8.00                 



             (BEAKER) (test code = 670)                                        

 

             LYMPHOCYTES ABSOLUTE COUNT 1.68 K/ L    1.48-4.50                 



             (BEAKER) (test code = 414)                                        

 

             MONOCYTES ABSOLUTE COUNT (BEAKER) 0.55 K/ L    0.00-1.30           

      



             (test code = 415)                                        

 

             EOSINOPHILS ABSOLUTE COUNT 0.24 K/ L    0.00-0.50                 



             (BEAKER) (test code = 416)                                        

 

             BASOPHILS ABSOLUTE COUNT (BEAKER) 0.05 K/ L    0.00-0.20           

      



             (test code = 417)                                        



[id number] [id number] 11:11:00





             Test Item    Value        Reference Range Interpretation Comments

 

             SODIUM (BEAKER) 138 meq/L    136-145                   



             (test code = 381)                                        

 

             POTASSIUM (BEAKER) 4.0 meq/L    3.5-5.1                   



             (test code = 379)                                        

 

             CHLORIDE (BEAKER) 108 meq/L           H            



             (test code = 382)                                        

 

             CO2 (BEAKER) (test 23 meq/L     22-29                     



             code = 355)                                         

 

             BLOOD UREA NITROGEN 11 mg/dL     7-21                      



             (BEAKER) (test code                                        



             = 354)                                              

 

             CREATININE (BEAKER) 0.74 mg/dL   0.57-1.25                 



             (test code = 358)                                        

 

             GLUCOSE RANDOM 124 mg/dL           H            



             (BEAKER) (test code                                        



             = 652)                                              

 

             CALCIUM (BEAKER) 8.9 mg/dL    8.4-10.2                  



             (test code = 697)                                        

 

             EGFR (BEAKER) (test 150 mL/min/1.73                           ESTIM

ATED GFR IS



             code = 1092) sq m                                   NOT AS ACCURATE

 AS



                                                                 CREATININE



                                                                 CLEARANCE IN



                                                                 PREDICTING



                                                                 GLOMERULAR



                                                                 FILTRATION RATE

.



                                                                 ESTIMATED GFR I

S



                                                                 NOT APPLICABLE 

FOR



                                                                 DIALYSIS PATIEN

TS.



URINE [id number] 09:56:00





             Test Item    Value        Reference Range Interpretation Comments

 

             CULTURE (BEAKER) (test <10,000 col/mL skin                         

  



             code = 1095) jaime                                  



COMPREHENSIVE METABOLIC [id number] 08:49:00





             Test Item    Value        Reference Range Interpretation Comments

 

             TOTAL PROTEIN 7.3 gm/dL    6.0-8.3                   



             (BEAKER) (test code =                                        



             770)                                                

 

             ALBUMIN (BEAKER) 3.3 g/dL     3.5-5.0      L            



             (test code = 1145)                                        

 

             ALKALINE PHOSPHATASE 89 U/L                           



             (BEAKER) (test code =                                        



             346)                                                

 

             BILIRUBIN TOTAL 1.4 mg/dL    0.2-1.2      H            



             (BEAKER) (test code =                                        



             377)                                                

 

             SODIUM (BEAKER) (test 137 meq/L    136-145                   



             code = 381)                                         

 

             POTASSIUM (BEAKER) 3.7 meq/L    3.5-5.1                   



             (test code = 379)                                        

 

             CHLORIDE (BEAKER) 108 meq/L           H            



             (test code = 382)                                        

 

             CO2 (BEAKER) (test 17 meq/L     22-29        L            



             code = 355)                                         

 

             BLOOD UREA NITROGEN 12 mg/dL     7-21                      



             (BEAKER) (test code =                                        



             354)                                                

 

             CREATININE (BEAKER) 0.78 mg/dL   0.57-1.25                 



             (test code = 358)                                        

 

             GLUCOSE RANDOM 119 mg/dL           H            



             (BEAKER) (test code =                                        



             652)                                                

 

             CALCIUM (BEAKER) 8.6 mg/dL    8.4-10.2                  



             (test code = 697)                                        

 

             AST (SGOT) (BEAKER) 13 U/L       5-34                      



             (test code = 353)                                        

 

             ALT (SGPT) (BEAKER) 28 U/L       6-55                      



             (test code = 347)                                        

 

             EGFR (BEAKER) (test 141                                    ESTIMATE

D GFR IS



             code = 1092) mL/min/1.73 sq                           NOT AS ACCURA

TE AS



                          m                                      CREATININE



                                                                 CLEARANCE IN



                                                                 PREDICTING



                                                                 GLOMERULAR



                                                                 FILTRATION RATE

.



                                                                 ESTIMATED GFR I

S



                                                                 NOT APPLICABLE 

FOR



                                                                 DIALYSIS PATIEN

TS.



CBC W/PLT COUNT &amp; AUTO [id number] 08:46:00





             Test Item    Value        Reference Range Interpretation Comments

 

             WHITE BLOOD CELL COUNT (BEAKER) 9.4 K/ L     4.0-10.0              

    



             (test code = 775)                                        

 

             RED BLOOD CELL COUNT (BEAKER) 4.79 M/ L    4.20-5.80               

  



             (test code = 761)                                        

 

             HEMOGLOBIN (BEAKER) (test code = 12.8 GM/DL   13.0-16.8    L       

     



             410)                                                

 

             HEMATOCRIT (BEAKER) (test code = 40.0 %       40.0-50.0            

     



             411)                                                

 

             MEAN CORPUSCULAR VOLUME (BEAKER) 83.4 fL      82.0-98.0            

     



             (test code = 753)                                        

 

             MEAN CORPUSCULAR HEMOGLOBIN 26.7 pg      27.0-33.0    L            



             (BEAKER) (test code = 751)                                        

 

             MEAN CORPUSCULAR HEMOGLOBIN CONC 32.0 GM/DL   32.0-36.0            

     



             (BEAKER) (test code = 752)                                        

 

             RED CELL DISTRIBUTION WIDTH 18.2 %       10.3-14.2    H            



             (BEAKER) (test code = 412)                                        

 

             PLATELET COUNT (BEAKER) (test 313 K/CU MM  150-430                 

  



             code = 756)                                         

 

             MEAN PLATELET VOLUME (BEAKER) 8.6 fL       6.5-10.5                

  



             (test code = 754)                                        

 

             NUCLEATED RED BLOOD CELLS 0 /100 WBC   0-0                       



             (BEAKER) (test code = 413)                                        

 

             NEUTROPHILS RELATIVE PERCENT 70 %                                  

 



             (BEAKER) (test code = 429)                                        

 

             LYMPHOCYTES RELATIVE PERCENT 16 %                                  

 



             (BEAKER) (test code = 430)                                        

 

             MONOCYTES RELATIVE PERCENT 12 %                                   



             (BEAKER) (test code = 431)                                        

 

             EOSINOPHILS RELATIVE PERCENT 1 %                                   

 



             (BEAKER) (test code = 432)                                        

 

             BASOPHILS RELATIVE PERCENT 1 %                                    



             (BEAKER) (test code = 437)                                        

 

             NEUTROPHILS ABSOLUTE COUNT 6.54 K/ L    1.80-8.00                 



             (BEAKER) (test code = 670)                                        

 

             LYMPHOCYTES ABSOLUTE COUNT 1.50 K/ L    1.48-4.50                 



             (BEAKER) (test code = 414)                                        

 

             MONOCYTES ABSOLUTE COUNT (BEAKER) 1.13 K/ L    0.00-1.30           

      



             (test code = 415)                                        

 

             EOSINOPHILS ABSOLUTE COUNT 0.13 K/ L    0.00-0.50                 



             (BEAKER) (test code = 416)                                        

 

             BASOPHILS ABSOLUTE COUNT (BEAKER) 0.06 K/ L    0.00-0.20           

      



             (test code = 417)                                        



0.00URINALYSIS W/ [id number] 20:36:00





             Test Item    Value        Reference Range Interpretation Comments

 

             COLOR (BEAKER) (test code = 470) Yellow                            

     

 

             CLARITY (BEAKER) (test code = 469) Hazy                            

       

 

             SPECIFIC GRAVITY UA (BEAKER) (test 1.012        1.001-1.035        

       



             code = 468)                                         

 

             PH UA (BEAKER) (test code = 467) 5.5          5.0-8.0              

     

 

             PROTEIN UA (BEAKER) (test code = 100 mg/dL    Negative     A       

     



             464)                                                

 

             GLUCOSE UA (BEAKER) (test code = Negative     Negative             

     



             365)                                                

 

             KETONES UA (BEAKER) (test code = Negative     Negative             

     



             371)                                                

 

             BILIRUBIN UA (BEAKER) (test code = Negative     Negative           

       



             462)                                                

 

             BLOOD UA (BEAKER) (test code = 461) Moderate     Negative     A    

        

 

             NITRITE UA (BEAKER) (test code = Negative     Negative             

     



             465)                                                

 

             LEUKOCYTE ESTERASE UA (BEAKER) Large        Negative     A         

   



             (test code = 466)                                        

 

             UROBILINOGEN UA (BEAKER) (test code 3.0 mg/dL    0.2-1.0      H    

        



             = 463)                                              

 

             RBC UA (BEAKER) (test code = 519) 19 /HPF                          

      

 

             WBC UA (BEAKER) (test code = 520) 182 /HPF                         

      

 

             SOURCE(BEAKER) (test code = 2795)                                  

      



BASIC METABOLIC [id number] 17:00:00





             Test Item    Value        Reference Range Interpretation Comments

 

             SODIUM (BEAKER) 140 meq/L    136-145                   



             (test code = 381)                                        

 

             POTASSIUM (BEAKER) 3.7 meq/L    3.5-5.1                   



             (test code = 379)                                        

 

             CHLORIDE (BEAKER) 112 meq/L           H            



             (test code = 382)                                        

 

             CO2 (BEAKER) (test 17 meq/L     22-29        L            



             code = 355)                                         

 

             BLOOD UREA NITROGEN 23 mg/dL     7-21         H            



             (BEAKER) (test code                                        



             = 354)                                              

 

             CREATININE (BEAKER) 1.00 mg/dL   0.57-1.25                 



             (test code = 358)                                        

 

             GLUCOSE RANDOM 102 mg/dL                        



             (BEAKER) (test code                                        



             = 652)                                              

 

             CALCIUM (BEAKER) 8.7 mg/dL    8.4-10.2                  



             (test code = 697)                                        

 

             EGFR (BEAKER) (test 106 mL/min/1.73                           ESTIM

ATED GFR IS



             code = 1092) sq m                                   NOT AS ACCURATE

 AS



                                                                 CREATININE



                                                                 CLEARANCE IN



                                                                 PREDICTING



                                                                 GLOMERULAR



                                                                 FILTRATION RATE

.



                                                                 ESTIMATED GFR I

S



                                                                 NOT APPLICABLE 

FOR



                                                                 DIALYSIS PATIEN

TS.



Specimen slightly ictericCBC W/PLT COUNT &amp; AUTO [id number] 
12:18:00





             Test Item    Value        Reference Range Interpretation Comments

 

             WHITE BLOOD CELL COUNT (BEAKER) 16.4 K/ L    4.0-10.0     H        

    



             (test code = 775)                                        

 

             RED BLOOD CELL COUNT (BEAKER) 5.22 M/ L    4.20-5.80               

  



             (test code = 761)                                        

 

             HEMOGLOBIN (BEAKER) (test code = 14.4 GM/DL   13.0-16.8            

     



             410)                                                

 

             HEMATOCRIT (BEAKER) (test code = 42.9 %       40.0-50.0            

     



             411)                                                

 

             MEAN CORPUSCULAR VOLUME (BEAKER) 82.2 fL      82.0-98.0            

     



             (test code = 753)                                        

 

             MEAN CORPUSCULAR HEMOGLOBIN 27.5 pg      27.0-33.0                 



             (BEAKER) (test code = 751)                                        

 

             MEAN CORPUSCULAR HEMOGLOBIN CONC 33.5 GM/DL   32.0-36.0            

     



             (BEAKER) (test code = 752)                                        

 

             RED CELL DISTRIBUTION WIDTH 16.2 %       10.3-14.2    H            



             (BEAKER) (test code = 412)                                        

 

             PLATELET COUNT (BEAKER) (test 329 K/CU MM  150-430                 

  



             code = 756)                                         

 

             MEAN PLATELET VOLUME (BEAKER) 8.2 fL       6.5-10.5                

  



             (test code = 754)                                        

 

             NUCLEATED RED BLOOD CELLS 0 /100 WBC   0-0                       



             (BEAKER) (test code = 413)                                        

 

             NEUTROPHILS RELATIVE PERCENT 83 %                                  

 



             (BEAKER) (test code = 429)                                        

 

             LYMPHOCYTES RELATIVE PERCENT 7 %                                   

 



             (BEAKER) (test code = 430)                                        

 

             MONOCYTES RELATIVE PERCENT 9 %                                    



             (BEAKER) (test code = 431)                                        

 

             EOSINOPHILS RELATIVE PERCENT 0 %                                   

 



             (BEAKER) (test code = 432)                                        

 

             BASOPHILS RELATIVE PERCENT 0 %                                    



             (BEAKER) (test code = 437)                                        

 

             NEUTROPHILS ABSOLUTE COUNT 1.62 K/ L    1.80-8.00    L            



             (BEAKER) (test code = 670)                                        

 

             LYMPHOCYTES ABSOLUTE COUNT 1.15 K/ L    1.48-4.50    L            



             (BEAKER) (test code = 414)                                        

 

             MONOCYTES ABSOLUTE COUNT (BEAKER) 1.56 K/ L    0.00-1.30    H      

      



             (test code = 415)                                        

 

             EOSINOPHILS ABSOLUTE COUNT 0.01 K/ L    0.00-0.50                 



             (BEAKER) (test code = 416)                                        

 

             BASOPHILS ABSOLUTE COUNT (BEAKER) 0.02 K/ L    0.00-0.20           

      



             (test code = 417)                                        



(MANUAL DIFFERENTIAL)2017 12:18:00





             Test Item    Value        Reference Range Interpretation Comments

 

             TOTAL COUNTED (BEAKER) (test code =                                

        



             1351)                                               

 

             WBC MORPHOLOGY (BEAKER) (test code = Normal                        

         



             487)                                                

 

             PLT MORPHOLOGY (BEAKER) (test code = Normal                        

         



             486)                                                

 

             RBC MORPHOLOGY (BEAKER) (test code = Normal                        

         



             762)

## 2022-04-11 NOTE — EKG
Test Date:    2022-04-08               Test Time:    14:26:50

Technician:   JING                                    

                                                     

MEASUREMENT RESULTS:                                       

Intervals:                                           

Rate:         89                                     

NY:           148                                    

QRSD:         78                                     

QT:           334                                    

QTc:          406                                    

Axis:                                                

P:            52                                     

NY:           148                                    

QRS:          55                                     

T:            60                                     

                                                     

INTERPRETIVE STATEMENTS:                                       

                                                     

Normal sinus rhythm with sinus arrhythmia

Normal ECG

Compared to ECG 11/02/2021 20:11:15

No significant changes



Electronically Signed On 04-11-22 09:36:25 CDT by Cheng Anglin

## 2022-06-14 ENCOUNTER — HOSPITAL ENCOUNTER (OUTPATIENT)
Dept: HOSPITAL 97 - ER | Age: 37
Setting detail: OBSERVATION
LOS: 2 days | Discharge: HOME HEALTH | End: 2022-06-16
Attending: INTERNAL MEDICINE | Admitting: INTERNAL MEDICINE
Payer: COMMERCIAL

## 2022-06-14 VITALS — BODY MASS INDEX: 56.5 KG/M2

## 2022-06-14 VITALS — OXYGEN SATURATION: 98 %

## 2022-06-14 DIAGNOSIS — Q05.7: ICD-10-CM

## 2022-06-14 DIAGNOSIS — Z88.3: ICD-10-CM

## 2022-06-14 DIAGNOSIS — G47.00: ICD-10-CM

## 2022-06-14 DIAGNOSIS — Z91.018: ICD-10-CM

## 2022-06-14 DIAGNOSIS — F32.A: ICD-10-CM

## 2022-06-14 DIAGNOSIS — Z88.2: ICD-10-CM

## 2022-06-14 DIAGNOSIS — Z88.5: ICD-10-CM

## 2022-06-14 DIAGNOSIS — Z90.49: ICD-10-CM

## 2022-06-14 DIAGNOSIS — G89.4: ICD-10-CM

## 2022-06-14 DIAGNOSIS — L89.522: ICD-10-CM

## 2022-06-14 DIAGNOSIS — M62.50: ICD-10-CM

## 2022-06-14 DIAGNOSIS — R32: ICD-10-CM

## 2022-06-14 DIAGNOSIS — K58.9: ICD-10-CM

## 2022-06-14 DIAGNOSIS — Z20.822: ICD-10-CM

## 2022-06-14 DIAGNOSIS — Z88.0: ICD-10-CM

## 2022-06-14 DIAGNOSIS — G82.20: ICD-10-CM

## 2022-06-14 DIAGNOSIS — Z91.14: ICD-10-CM

## 2022-06-14 DIAGNOSIS — E11.00: Primary | ICD-10-CM

## 2022-06-14 DIAGNOSIS — Z88.8: ICD-10-CM

## 2022-06-14 DIAGNOSIS — K21.9: ICD-10-CM

## 2022-06-14 DIAGNOSIS — Z82.49: ICD-10-CM

## 2022-06-14 DIAGNOSIS — Z79.4: ICD-10-CM

## 2022-06-14 DIAGNOSIS — F17.210: ICD-10-CM

## 2022-06-14 LAB
ALBUMIN SERPL BCP-MCNC: 3.6 G/DL (ref 3.4–5)
ALP SERPL-CCNC: 147 U/L (ref 45–117)
ALT SERPL W P-5'-P-CCNC: 129 U/L (ref 12–78)
AST SERPL W P-5'-P-CCNC: 23 U/L (ref 15–37)
BUN BLD-MCNC: 9 MG/DL (ref 7–18)
COHGB MFR BLDA: 2.8 % (ref 0–1.5)
GLUCOSE SERPLBLD-MCNC: 822 MG/DL (ref 74–106)
HCT VFR BLD CALC: 48.5 % (ref 39.6–49)
INR BLD: 1.07
LYMPHOCYTES # SPEC AUTO: 1.5 K/UL (ref 0.7–4.9)
MAGNESIUM SERPL-MCNC: 1.9 MG/DL (ref 1.8–2.4)
NT-PROBNP SERPL-MCNC: 7 PG/ML (ref ?–125)
OXYHGB MFR BLDA: 91.9 % (ref 94–97)
PMV BLD: 9.1 FL (ref 7.6–11.3)
POTASSIUM SERPL-SCNC: 3.6 MMOL/L (ref 3.5–5.1)
RBC # BLD: 5.33 M/UL (ref 4.33–5.43)
SAO2 % BLDA: 95.9 % (ref 92–98.5)
TROPONIN I SERPL HS-MCNC: 4.3 PG/ML (ref ?–58.9)

## 2022-06-14 PROCEDURE — 96374 THER/PROPH/DIAG INJ IV PUSH: CPT

## 2022-06-14 PROCEDURE — 82947 ASSAY GLUCOSE BLOOD QUANT: CPT

## 2022-06-14 PROCEDURE — 99284 EMERGENCY DEPT VISIT MOD MDM: CPT

## 2022-06-14 PROCEDURE — 96372 THER/PROPH/DIAG INJ SC/IM: CPT

## 2022-06-14 PROCEDURE — 84484 ASSAY OF TROPONIN QUANT: CPT

## 2022-06-14 PROCEDURE — 85610 PROTHROMBIN TIME: CPT

## 2022-06-14 PROCEDURE — 71045 X-RAY EXAM CHEST 1 VIEW: CPT

## 2022-06-14 PROCEDURE — 96375 TX/PRO/DX INJ NEW DRUG ADDON: CPT

## 2022-06-14 PROCEDURE — 80076 HEPATIC FUNCTION PANEL: CPT

## 2022-06-14 PROCEDURE — 82805 BLOOD GASES W/O2 SATURATION: CPT

## 2022-06-14 PROCEDURE — 82010 KETONE BODYS QUAN: CPT

## 2022-06-14 PROCEDURE — 83735 ASSAY OF MAGNESIUM: CPT

## 2022-06-14 PROCEDURE — 83880 ASSAY OF NATRIURETIC PEPTIDE: CPT

## 2022-06-14 PROCEDURE — 85025 COMPLETE CBC W/AUTO DIFF WBC: CPT

## 2022-06-14 PROCEDURE — 36415 COLL VENOUS BLD VENIPUNCTURE: CPT

## 2022-06-14 PROCEDURE — 93005 ELECTROCARDIOGRAM TRACING: CPT

## 2022-06-14 PROCEDURE — 80048 BASIC METABOLIC PNL TOTAL CA: CPT

## 2022-06-14 PROCEDURE — 80053 COMPREHEN METABOLIC PANEL: CPT

## 2022-06-14 PROCEDURE — 83036 HEMOGLOBIN GLYCOSYLATED A1C: CPT

## 2022-06-14 PROCEDURE — 99251: CPT

## 2022-06-14 RX ADMIN — HYDROMORPHONE HYDROCHLORIDE PRN MG: 4 TABLET ORAL at 15:15

## 2022-06-14 RX ADMIN — HYDROCODONE BITARTRATE AND ACETAMINOPHEN ONE: 7.5; 325 TABLET ORAL at 19:00

## 2022-06-14 RX ADMIN — Medication SCH ML: at 20:49

## 2022-06-14 RX ADMIN — SODIUM CHLORIDE SCH MLS: 0.9 INJECTION, SOLUTION INTRAVENOUS at 20:49

## 2022-06-14 RX ADMIN — SODIUM CHLORIDE SCH MLS: 0.9 INJECTION, SOLUTION INTRAVENOUS at 11:22

## 2022-06-14 RX ADMIN — HUMAN INSULIN SCH UNIT: 100 INJECTION, SOLUTION SUBCUTANEOUS at 17:02

## 2022-06-14 RX ADMIN — HUMAN INSULIN SCH UNIT: 100 INJECTION, SOLUTION SUBCUTANEOUS at 11:30

## 2022-06-14 RX ADMIN — HUMAN INSULIN SCH UNIT: 100 INJECTION, SOLUTION SUBCUTANEOUS at 20:48

## 2022-06-14 RX ADMIN — HYDROCODONE BITARTRATE AND ACETAMINOPHEN ONE TAB: 7.5; 325 TABLET ORAL at 18:23

## 2022-06-14 RX ADMIN — Medication SCH ML: at 09:09

## 2022-06-14 NOTE — EKG
Test Date:    2022-06-14               Test Time:    03:56:07

Technician:   EDILSON                                     

                                                     

MEASUREMENT RESULTS:                                       

Intervals:                                           

Rate:         100                                    

UT:           164                                    

QRSD:         108                                    

QT:           348                                    

QTc:          448                                    

Axis:                                                

P:            61                                     

UT:           164                                    

QRS:          48                                     

T:            11                                     

                                                     

INTERPRETIVE STATEMENTS:                                       

                                                     

Normal sinus rhythm

T wave abnormality, consider inferior ischemia

Abnormal ECG

Compared to ECG 04/08/2022 14:26:50

T-wave abnormality now present

Possible ischemia now present

Sinus arrhythmia no longer present



Electronically Signed On 06-14-22 11:04:49 CDT by Cheng Anglin

## 2022-06-14 NOTE — XMS REPORT
Continuity of Care Document

                            Created on:2022



Patient:JORDAN VERDIN JR

Sex:Male

:1985

External Reference #:[id number]





Demographics







                          Address                   [address]



                                                    [place] [address]

 

                          Home Phone                [phone]

 

                          Work Phone                [phone]

 

                          Mobile Phone              [phone]

 

                          Email Address             DECLINED

 

                          Preferred Language        English

 

                          Marital Status            Unknown

 

                          Pentecostalism Affiliation     Unknown

 

                          Race                      Unknown

 

                          Additional Race(s)        Unavailable



                                                    Black or 

 

                          Ethnic Group              Unknown









Author







                          Organization              [facility]

t

 

                          Address                   [address]



                                                    [place] [address]

 

                          Phone                     [phone]









Support







                Name            Relationship    Address         Phone

 

                LAMBERT LÓPEZ              [address]           [phone]



                                                [address] [place] [address] 

 

                LAMBERT          [place]              [address] [phone]



                                                [place] [address] 

 

                Lambert          Unavailable     [address] [phone]



                                                [place] [address] 

 

                Lambert          Unavailable     Unavailable     [phone]

 

                Lambert Jr       Unavailable     [phone]

[phone]



                                                [place] [address] 

 

                one else per patient Unavailable     Unavailable     Unavailable

 

                Lambert          Mother          [address] [phone]

[phone]



                                                [place] [address] 

 

                PATIENT,  ONE ELSE PER Unavailable     Unavailable     Unavailab

oumar AGUSTIN        Grandparent     Unavailable     Unavailable









Care Team Providers







                    Name                Role                Phone

 

                    Sharpless           Primary Care Physician [phone]

 

                    Jesusita               Attending Clinician Unavailable

 

                    LEO             Attending Clinician Unavailable

 

                    Octavio JACOBSON         Attending Clinician [phone]

 

                    Leo MEADOWS          Attending Clinician [phone]

 

                    Ronel RN,  M       Attending Clinician [phone]

 

                                  Attending Clinician Unavailable

 

                    Brigido MEADOWS           Attending Clinician [phone]

 

                    David MEADOWS        Attending Clinician [phone]

 

                    Shar MEADOWS       Attending Clinician [phone]

 

                    Andre MEADOWS,  H        Attending Clinician [phone]

 

                    Shaikh DORI           Attending Clinician [phone]

 

                    Thomas BOYD           Attending Clinician [phone]

 

                    LETITIA             Attending Clinician Unavailable

 

                    Letitia MEADOWS          Attending Clinician [phone]

 

                    OSWALDO FAIRCHILD     Attending Clinician Unavailable

 

                    SINGER              Attending Clinician Unavailable

 

                    Singanisa RANDHAWA           Attending Clinician [phone]

 

                    ELENA ROMEO      Attending Clinician Unavailable

 

                    Elena Carver  Attending Clinician [phone]

 

                    DARWIN GARAY          Attending Clinician Unavailable

 

                    DARWIN Garay MD       Attending Clinician [phone]

 

                    AMXIM VILLASEÑOR          Attending Clinician Unavailable

 

                    Kasi FNP,  J      Attending Clinician [phone]

 

                    VVI MARIA          Attending Clinician Unavailable

 

                    Maryellen NP,  G       Attending Clinician [phone]

 

                    Only,  Db Test      Attending Clinician Unavailable

 

                    Mario Alberto MEADOWS             Attending Clinician [phone]

 

                    MARIO ALBERTO                Attending Clinician Unavailable

 

                    Daisy JACOBSON,  F    Attending Clinician [phone]

 

                    TWAN VILLA        Attending Clinician Unavailable

 

                    SILVESTRE             Attending Clinician Unavailable

 

                    RALPH TORRES         Attending Clinician Unavailable

 

                    LEO             Admitting Clinician Unavailable

 

                    Leo MEADOWS          Admitting Clinician [phone]

 

                    DAVID           Admitting Clinician Unavailable

 

                    David MEADOWS        Admitting Clinician [phone]

 

                    LETITIA             Admitting Clinician Unavailable

 

                    Letitia MEADOWS          Admitting Clinician [phone]

 

                    PEREZ BOWEN    Admitting Clinician Unavailable

 

                    ELENA ROMEO      Admitting Clinician Unavailable

 

                    MAXIM VILLASEÑOR          Admitting Clinician Unavailable

 

                    VIV MARIA          Admitting Clinician Unavailable

 

                    RANDALL              Admitting Clinician Unavailable

 

                    RALPH TORRES         Admitting Clinician Unavailable









Payers







           Payer Name Policy Type Policy Number Effective Date Expiration Date S

cameron

 

           [employer]            [id number]  2021            



           PLUS                             00:00:00              

 

           [facility]            [id number]  2021            



                                            00:00:00              







Problems







       Condition Condition Condition Status Onset  Resolution Last   Treating Co

mments 

Source



       Name   Details Category        Date   Date   Treatment Clinician        



                                                 Date                 

 

       Hyperglyce Hyperglyce Disease Active                              U

jeferson conley                                                 ity of



                                   00:00:                             [address]

 

       Diabetic Diabetic Disease Active                              Unive

rs



       ketoacidos ketoacidos                                              it

y of



       is without is without               00:00:                             [place]



       coma   coma                 00                                 Medical



       associated associated                                                  Br

anch



       with type with type                                                  



       2 diabetes 2 diabetes                                                  



       mellitus mellitus                                                  

 

       Decubitus Decubitus Disease Active                              Uni

vers



       ulcer of ulcer of               05                               ity of



       heel,  heel,                00:00:                             [place]



       left,  left,                00                                 Medical



       unstageabl unstageabl                                                  Br

anch



       e      e                                                       

 

       Pyuria Pyuria Disease Active                              Univers



                                   6-05                               ity of



                                   00:00:                             [place]



                                   00                                 Medical



                                                                      Branch

 

       Chronic Chronic Disease Active                              Univers



       suprapubic suprapubic               6                               it

y of



       catheter catheter               00:00:                             [place]



                                                                    Medical



                                                                      Branch

 

       Uncontroll Uncontroll Disease Active                              U

jeferson



       ed     ed                   6                               ity of



       diabetes diabetes               00:00:                             [place]



       mellitus mellitus               00                                 Medica

l



       with   with                                                    Branch



       complicati complicati                                                  



       ons    ons                                                     

 

       Spina  Spina  Disease Active                              Univers



       bifida bifida                                              ity of



                                   00:00:                             [place]



                                                                    Medical



                                                                      Branch

 

       Wound  Wound  Disease Active                              Univers



       infection infection               -                               ity 

of



                                   00:00:                             [place]



                                   00                                 Medical



                                                                      Branch

 

       Chills Chills Disease Active                              Univers



                                   5-                               ity of



                                   00:00:                             [place]



                                   00                                 Medical



                                                                      Branch

 

       Complicate Complicate Disease Active                              U

jeferson



       d urinary d urinary               1-20                               ity 

of



       tract  tract                00:00:                             [place]



       infection infection               00                                 Medi

sarah



                                                                      Branch

 

       Hyperglyce Hyperglyce Disease Active                              C

[facility]



       jarvis    jarvis                  1-07                               [facility]



       without without               00:00:                             Medical



       ketosis ketosis               00                                 Center

 

       HEADACHE        Diagnosis Active 2018               M

[facility]



                                             09:20:00               l



                HEADACHE               00:00:                             [facility]

n



                                   00                                 



                                                                      



               Active                                                  



                                                                      



                                                                      



                                                                      



                                                                      



                                                                      



                                                                      



                                                                      



                                                                      



                                                                      



                                                                      



                    [facility]                                                  



                                                                      



                                                                      

 

       SHUNT         Diagnosis Active 2018               Mem

oria



       MALFUNCTIO                                20:00:00               l



       N        SHUNT               00:00:                             [place]



              MALFUNCTIO               00                                 



              N                                                       



                                                                      



                 Active                                                  



                                                                      



                                                                      



              2018                                                  



                                                                      



                                                                      



                                                                      



                                                                      



                                                                      



                                                                      



                                                                      



                                                                      



                     [facility]                                                  



                                                                      



                                                                      

 

       ACUTE         Diagnosis Active 2018               Mem

oria



       HEADACHE                                09:20:00               l



                ACUTE               00:00:                             Jose



              HEADACHE               00                                 



                                                                      



                                                                      



              Active                                                  



                                                                      



                                                                      



              2018                                                  



                                                                      



                                                                      



                                                                      



                                                                      



                                                                      



                                                                      



                                                                      



                                                                      



                     [facility]                                                  



                                                                      



                                                                      

 

       Spina  Spina  Disease Active                              [facility]



       bifida bifida               6-12                               [facility]



                                   00:00[facility]                             [facility]



                                   00                                 Center

 

       Pyelonephr Pyelonephr Disease Active                              C

[facility]



       itis   itis                 6-11                               [facility]



                                   00:00:                             Medical



                                   00                                 [place]

 

       Morbid Morbid Disease Active                              [facility]



       obesity obesity               1-04                               ity of



       with body with body               00:00:                             [place]

[place]



       mass index mass index               00                                 Me

dical



       of 50 or of 50 or                                                  Branch



       higher higher                                                  

 

       Morbid Morbid Disease Active                              [facility]



       obesity obesity               1-04                               ity of



       with body with body               00:00:                             [place]

[place]



       mass index mass index               00                                 Me

dical



       of     of                                                      Branch



       40.0-49.9 40.0-49.9                                                  

 

       Spina         Problem                      2019               Memor

ia



       bifida,                                    14:14:02               l



       unspecifie   Spina                                                  [place]il[place]

nn



       d      bifida,                                                  



              unspecifie                                                  



              d                                                       



                                                                      



                                                                      



                                                                      



                                                                      



                                                                      



                                                                      



                                                                      



                                                                      



              2019                                                  



                                                                      



                                                                      



                                                                      



                                                                      



                 [facility]                                                  



                                                                      



                                                                      

 

       Nausea        Problem                      2019               Memor

ia



       with                                      14:14:02               l



       vomiting,   Nausea                                                  [place]

nn



       unspecifie with                                                    



       d      vomiting,                                                  



              unspecifie                                                  



              d                                                       



                                                                      



                                                                      



                                                                      



                                                                      



                                                                      



                                                                      



                                                                      



                                                                      



              2019                                                  



                                                                      



                                                                      



                                                                      



                                                                      



                 [facility]                                                  



                                                                      



                                                                      

 

       Diplopia        Problem                      2019               Mem

oria



                                                 14:14:02               l



                Diplopia                                                  Miguel

n



                                                                      



                                                                      



                                                                      



                                                                      



                                                                      



                                                                      



                                                                      



                                                                      



                                                                      



              2019                                                  



                                                                      



                                                                      



                                                                      



                                                                      



                 [facility]                                                  



                                                                      



                                                                      

 

       Cerebral        Problem                      2019               Mem

oria



       palsy,                                    14:14:02               l



       unspecifie   Cerebral                                                  He

rmann



       d      palsy,                                                  



              unspecifie                                                  



              d                                                       



                                                                      



                                                                      



                                                                      



                                                                      



                                                                      



                                                                      



                                                                      



                                                                      



              2019                                                  



                                                                      



                                                                      



                                                                      



                                                                      



                 [facility]                                                  



                                                                      



                                                                      

 

       Acquired        Problem                      2019               [facility]

[facility]



       absence of                                    14:14:02               l



       other    Acquired                                                  [place]

n



       specified absence of                                                  



       parts of other                                                   



       digestive specified                                                  



       tract  parts of                                                  



              digestive                                                  



              tract                                                   



                                                                      



                                                                      



                                                                      



                                                                      



                                                                      



                                                                      



                                                                      



                                                                      



                                                                      



              2019                                                  



                                                                      



                                                                      



                                                                      



                                                                      



                 [facility]                                                  



                                                                      



                                                                      

 

       Nicotine        Problem                      2019               [facility]

[facility]



       dependence                                    14:14:02               l



       ,        Nicotine                                                  [place]

n



       cigarettes dependence                                                  



       ,      ,                                                       



       uncomplica cigarettes                                                  



       huma    ,                                                       



              uncomplica                                                  



              huma                                                     



                                                                      



                                                                      



                                                                      



                                                                      



                                                                      



                                                                      



                                                                      



                                                                      



              2019                                                  



                                                                      



                                                                      



                                                                      



                                                                      



                 [facility]                                                  



                                                                      



                                                                      

 

       Presence        Problem                      2019               [facility]

[facility]



       of                                        14:14:02               l



       cerebrospi   Presence                                                  He

rmann



       nal fluid of                                                      



       drainage cerebrospi                                                  



       device nal fluid                                                  



              drainage                                                  



              device                                                  



                                                                      



                                                                      



                                                                      



                                                                      



                                                                      



                                                                      



                                                                      



                                                                      



                                                                      



              2019                                                  



                                                                      



                                                                      



                                                                      



                                                                      



                 [facility]                                                  



                                                                      



                                                                      

 

       Allergy        Problem                      2019               [facility]

rajeev



       status to                                    14:14:02               l



       other    Allergy                                                  [place]



       antibiotic status to                                                  



       agents other                                                   



       status antibiotic                                                  



              agents                                                  



              status                                                  



                                                                      



                                                                      



                                                                      



                                                                      



                                                                      



                                                                      



                                                                      



                                                                      



                                                                      



              2019                                                  



                                                                      



                                                                      



                                                                      



                                                                      



                 [facility]                                                  



                                                                      



                                                                      

 

       Allergy        Problem                      2019               [facility]

rajeev



       status to                                    14:14:02               l



       other    Allergy                                                  [place]



       drugs, status to                                                  



       medicament other                                                   



       s and  drugs,                                                  



       biological medicament                                                  



       substances s and                                                   



       status biological                                                  



              substances                                                  



              status                                                  



                                                                      



                                                                      



                                                                      



                                                                      



                                                                      



                                                                      



                                                                      



                                                                      



                                                                      



              2019                                                  



                                                                      



                                                                      



                                                                      



                                                                      



                 [facility]                                                  



                                                                      



                                                                      

 

       Allergy        Problem                      2019               [facility]

rajeev



       status to                                    14:14:02               l



       narcotic   Allergy                                                  [place]

nn



       agent  status to                                                  



       status narcotic                                                  



              agent                                                   



              status                                                  



                                                                      



                                                                      



                                                                      



                                                                      



                                                                      



                                                                      



                                                                      



                                                                      



                                                                      



              2019                                                  



                                                                      



                                                                      



                                                                      



                                                                      



                 [facility]                                                  



                                                                      



                                                                      

 

       Asthma        Problem Resolve               2019               [facility]

rajeev



       (disorder)               d                    01:05:55               l



                Asthma                                                  Jose



              (disorder)                                                  



                                                                      



                                                                      



               Resolved                                                  



                                                                      



                                                                      



                                                                      



                                                                      



                Problem                                                  



                                                                      



                                                                      



              2019                                                  



                                                                      



                                                                      



                                                                      



                                                                      



                 [facility]                                                  



                                                                      



                                                                      

 

       Bronchitis        Problem Resolve               2019               

[facility]



       (disorder)               d                    01:05:55               l



                                                                      [place]



              Bronchitis                                                  



              (disorder)                                                  



                                                                      



                                                                      



               Resolved                                                  



                                                                      



                                                                      



                                                                      



                                                                      



                Problem                                                  



                                                                      



                                                                      



              2019                                                  



                                                                      



                                                                      



                                                                      



                                                                      



                 [facility]                                                  



                                                                      



                                                                      

 

       Cerebral        Problem Resolve               2019               Me

moria



       palsy                d                    01:05:55               l



       (disorder)   Cerebral                                                  He

rmann



              palsy                                                   



              (disorder)                                                  



                                                                      



                                                                      



               Resolved                                                  



                                                                      



                                                                      



                                                                      



                                                                      



                Problem                                                  



                                                                      



                                                                      



              2019                                                  



                                                                      



                                                                      



                                                                      



                                                                      



                 [facility]                                                  



                                                                      



                                                                      

 

       Hydrocepha        Problem Resolve               2019               

[facility]



       ozzy                  d                    01:05:55               l



       (disorder)                                                         [place]

n



              Hydrocepha                                                  



              ozzy                                                     



              (disorder)                                                  



                                                                      



                                                                      



               Resolved                                                  



                                                                      



                                                                      



                                                                      



                                                                      



                Problem                                                  



                                                                      



                                                                      



              2019                                                  



                                                                      



                                                                      



                                                                      



                                                                      



                 [facility]                                                  



                                                                      



                                                                      

 

       Osteomyeli        Problem Resolve               2019               

[facility]



       tis                  d                    01:05:55               l



       (disorder)                                                         [place]

n



              Osteomyeli                                                  



              tis                                                     



              (disorder)                                                  



                                                                      



                                                                      



               Resolved                                                  



                                                                      



                                                                      



                                                                      



                                                                      



                Problem                                                  



                                                                      



                                                                      



              2019                                                  



                                                                      



                                                                      



                                                                      



                                                                      



                 [facility]                                                  



                                                                      



                                                                      

 

       Acute pain        Problem Active               2019               M

[place]



       (finding)                                    01:05:55               l



                Acute                                                  [place]



              pain                                                    



              (finding)                                                  



                                                                      



                                                                      



              Active                                                  



                                                                      



                                                                      



                                                                      



                                                                      



              Problem                                                  



                                                                      



                                                                      



              2019                                                  



                                                                      



                                                                      



                                                                      



                                                                      



                 [facility]                                                  



                                                                      



                                                                      

 

       Headache        Problem Active               2019               [facility]

[facility]



       (finding)                                    01:05:55               l



                Headache                                                  [place]

n



              (finding)                                                  



                                                                      



                                                                      



              Active                                                  



                                                                      



                                                                      



                                                                      



                                                                      



              Problem                                                  



                                                                      



                                                                      



              2019                                                  



                                                                      



                                                                      



                                                                      



                                                                      



                 [facility]                                                  



                                                                      



                                                                      







History of Past Illness







       Condition Condition Condition Status Onset  Resolution Last   Treating Co

mments 

Source



       Name   Details Category        Date   Date   Treatment Clinician        



                                                 Date                 

 

       Headache        Problem        2019              

 Memoria



                                   -08   14:14:02 14:14:02               l



                Headache               06:00:                             Miguel

n



                                   00                                 



                                                                      



                                                                      



                                                                      



                                                                      



              2018                                                  



                                                                      



                                                                      



                                                                      



                                                                      



                 [facility]                                                  



                                                                      



                                                                      







Allergies, Adverse Reactions, Alerts







       Allergy Allergy Status Severity Reaction(s) Onset  Inactive Treating Comm

ents 

Source



       Name   Type                        Date   Date   Clinician        

 

       Amoxicil Propensi Active        Hives                        Univer

s



       bryn    ty to                                               ity of



              adverse                      00:00:                      [place]



              reaction                      00                          Medical



              s                                                       Branch

 

       AMOXICIL DRUG   Active        Hives                        [facility]



       BRYN    INGREDI                                              ity of



                                          00:00:                      [place]



                                          00                          Medical



                                                                      Branch

 

       Metoclop Propensi Active        Nausea 2017                      Univer

s



       ramide ty to                and/or 2-20                        ity of



       Hcl    adverse               Vomiting 00:00:                      [place]



              reaction                      00                          Medical



              s                                                       Branch

 

       METOCLOP DRUG   Active        N/V    2017                      [facility]



       RAMIDE INGREDI                      2-20                        ity of



       HCL                                00:00:                      [place]



                                          00                          Medical



                                                                      Branch

 

       Sulfamet Propensi Active        Hives  2017-                      [facility]



       hoxazole ty to                       6-11                        [facility]



       [facility] adverse                      00:00:                      [facility]



       oprim  reaction                      00                          Center



              s                                                       

 

       Levoflox Propensi Active        Hives  2017-                      [facility]



       acin   ty to                       6-11                        [facility]



              adverse                      00:00:                      [facility]



              reaction                      00                          Center



              s                                                       

 

       SULFAMET Allergy Active High   Hives  2017-                      [facility]



       HOXAZOLE                             6-11                        [facility]



       [facility]                             00:00:                      Medical



       OPRIM                              00                          Center

 

       LEVOFLOX Allergy Active High   Hives  2017-                      [facility]



       ACIN                               6-11                        [facility]



                                          00:00:                      [facility]



                                          00                          Center

 

       MORPHINE Allergy Active High   Hives  2017-                      [facility]



                                          6-11                        [facility]



                                          00[facility]:                      [facility]



                                          00                          Center

 

       KETOROLA Allergy Active High   Rash   2017-                      [facility]



       C                                  6-11                        [facility]



                                          00:00:                      [facility]



                                          00                          Center

 

       VANCOMYC Allergy Active High   Rash   2017-                      [facility]



       IN                                 6-11                        [facility]



       ANALOGUE                             00:00:                      Medical



       S                                  00                          Center

 

       SESAME Allergy Active        Hives  2017-                      [facility]



       SEED                               6-11                        [facility]



                                          00:00:                      [facility]



                                          00                          Center

 

       Morphine Propensi Active        Hives  -                      [facility]



              ty to                       6-11                        [facility]



              adverse                      00:00:                      Medical



              reaction                      00                          Center



              s                                                       

 

       ONDANSET Allergy Active        N\T\V  -                      [facility]



       EVIN HCL                             6-11                        [facility]



       (PF)                               00:00:                      Medical



                                          00                          Center

 

       Sesame Propensi Active        Hives  2017-                      [facility]



       Seed   ty to                       6-11                        [facility]



              adverse                      00:00:                      Medical



              reaction                      00                          Center



              s                                                       

 

       Ketorola Propensi Active        Rash   2017-                      [facility]



       c      ty to                       6-11                        [facility]



              adverse                      00:00:                      Medical



              reaction                      00                          Center



              s                                                       

 

       Vancomyc Propensi Active        Rash   2017-                      [facility]



       in     ty to                       6-11                        [facility]



       Analogue adverse                      00:00:                      Medical



       s      reaction                      00                          Center



              s                                                       

 

       Ondanset Propensi Active        Nausea And 2017-0                      [facility]



       evin Hcl ty to                Vomiting 6-11                        [facility]



       (Pf)   adverse                      00:00:                      Medical



              reaction                      00                          Center



              s                                                       

 

       Sulfa  Propensi Active        Other - See                       Uni

vers



       (Sulfona ty to                comments 1                        ity of



       mide   adverse                      00:00:                      [place]



       Antibiot reaction                      00                          Medica

l



       ics)   s                                                       Branch

 

       Sulfamet Propensi Active        Other - See                       U

nivers



       hoprim ty to                comments                         ity of



       Ds     adverse                      00:00:                      [place]



              reaction                      00                          Medical



              s                                                       Branch

 

       Vancomyc Propensi Active        Other - See                       U

nivers



       in     ty to                comments                         ity of



              adverse                      00:00:                      [place]



              reaction                      00                          Medical



              s                                                       Branch

 

       Sulfa  Propensi Active        Other - See                       Uni

vers



       (Sulfona ty to                comments                         ity of



       mide   adverse                      00:00:                      [place]



       Antibiot reaction                      00                          Medica

l



       ics)   s                                                       Branch

 

       SULFA  Drug   Active        Other-Cmnt                       Univer

s



       (SULFONA Class                                               ity of



       MIDE                               00:00:                      [place]



       ANTIBIOT                             00                          Medical



       ICS)                                                           Branch

 

       SULFAMET DRUG   Active        Other-Cmnt                       Univ

ers



       HOPRIM                                                     ity of



       DS                                 00:00:                      [place]



                                          00                          Medical



                                                                      Branch

 

       VANCOMYC DRUG   Active        Other-Cmnt                       Univ

ers



       IN     INGREDI                                              ity of



                                          00:00:                      [place]



                                          00                          Medical



                                                                      Branch

 

       Sulfamet Propensi Active        Rash                         Univer

s



       hoxazole ty to                       7-19                        ity of



              adverse                      00:00:                      [place]



              reaction                      00                          Medical



              s                                                       Branch

 

       Ondanset Propensi Active        Nausea                       Univer

s



       evin Hcl ty to                and/or                         ity of



       (Pf)   adverse               Vomiting 00:00:                      [place]



              reaction                      00                          Medical



              s                                                       Branch

 

       SULFAMET DRUG   Active        Rash                         Univers



       HOXAZOLE INGREDI                      7-19                        ity of



                                          00:00:                      [place]



                                          00                          Medical



                                                                      Branch

 

       ONDANSET DRUG   Active        N/V                          Univers



       EVIN HCL                             7-19                        ity of



       (PF)                               00:00:                      [place]



                                          00                          Medical



                                                                      Branch

 

       Levoflox Propensi Active        Hives                        Univer

s



       acin   ty to                       5-                        ity of



              adverse                      00:00:                      [place]



              reaction                      00                          Medical



              s                                                       Branch

 

       Morphine Propensi Active        Hives                        Univer

s



              ty to                       5-23                        ity of



              adverse                      00:00:                      [place]



              reaction                      00                          Medical



              s                                                       Branch

 

       Ketorola Propensi Active        Nausea                       Univer

s



       c      ty to                and/or                         ity of



       Trometha adverse               Vomiting 00:00:                      [place]



       mine   reaction                      00                          Medical



              s                                                       Branch

 

       LEVOFLOX DRUG   Active        Hives                        [facility]



       ACIN   INGREDI                                              ity of



                                          00:00:                      [place]



                                          00                          Medical



                                                                      Branch

 

       MORPHINE DRUG   Active        Hives                        Univers



              INGREDI                                              ity of



                                          00:00:                      [place]



                                          00                          Medical



                                                                      Branch

 

       KETOROLA DRUG   Active        N/V                          Univers



       C      INGREDI                                              ity of



       TROMETHA                             00:00:                      [place]



       MINE                               00                          Medical



                                                                      Branch

 

       Morphine Adverse Active        Info Not                             Commo

n



       Sulfate Reaction               Available                             [facility]

 

       Levaquin Adverse Active        Info Not                             Commo

n



              Reaction               Available                             [facility]

 

       Zofran Adverse Active        Info Not                             Common



              Reaction               Available                             [facility]

 

       Minocin Minocin Active                                           [facility]



                                                                      l



                                                                      [place]

 

       Zofran Zofran Active                                           [facility]



                                                                      l



                                                                      [place]

 

       Levaquin Levaquin Active                                           [facility]

a



                                                                      l



                                                                      [place]

 

       Bactrim Bactrim Active                                           [facility]



                                                                      l



                                                                      [place]

 

       amoxicil amoxicil Active                                           [facility]

a



       bryn    bryn                                                     l



                                                                      [place]

 

       morphine morphine Active                                           [facility]

a



                                                                      l



                                                                      [place]

 

       Toradol Toradol Active                                           [facility]



                                                                      l



                                                                      [place]







Social History







           Social Habit Start Date Stop Date  Quantity   Comments   Source

 

           History of tobacco                       Cigarette Smoker            

[facility]

 

           Exposure to 2022 Not sure              [facility]



           SARS-CoV-2 (event) 00:00:00   12:04:00                         [facility]

 

           Alcohol intake 2022 0 /d                  [facility]

 of



                      00:00:00   00:00:00                         [facility]

 

           Cigarettes smoked 2017                       [facility]



           current (pack per 00:00:00   00:00:00                         Medical

 Center



           day) - Reported                                             

 

           Tobacco use and 2017 Never used            [facility]

y of



           exposure   00:00:00   00:00:00                         [facility]

 

           Sex Assigned At 1985                       [facility]

y of



           Birth      00:00:00   00:00:00                         [facility]









                Smoking Status  Start Date      Stop Date       Source

 

                Current every day smoker 2017 00:00:00                 Uni

versity [facility]







Medications







       Ordered Filled Start  Stop   Current Ordering Indication Dosage Frequency

 Signature

                    Comments            Components          Source



     Medication Medication Date Date Medication? Clinician                (SIG) 

          



     Name Name                                                   

 

     hydromorpho            Yes            4ug       Take 4 mcg           

Univers



     ne HCl      6-10                               by mouth           ity of



     (HYDROMORPH      19:50:                               every 12           Te

[place]



     ONE ORAL)      08                                 (twelve)           Medica

l



                                                  hours.           Branch

 

     magnesium            Yes            400mg      400 mg,           Univ

ers



     oxide      6-10                               Oral,           ity of



     (MAG-OX      14:00:                               DAILY,           [place]



     400) tablet      00                                 First dose           Me

dical



     400 mg                                         on Fri           [place]



                                                  6/10/22 at           



                                                  0900,           



                                                  Until           



                                                  Discontinu           



                                                  ed,            



                                                  Routine           

 

     HYDROmorpho      2022- No             1mg       1 mg, Slow          

 Univers



     ne        6-10 06-10                          IV Push,           ity of



     (DILAUDID)      05:15: 04:42                          ONCE, 1           Eric

as



     injection 1      00   :00                           dose, On           Medi

sarah



     mg                                           Fri            [place]



                                                  6/10/22 at           



                                                  0015,           



                                                  Routine<br           



                                                  >Use           



                                                  approved           



                                                  by             



                                                  (Faculty):           



                                                  ADC            



                                                  PROVIDER           

 

     acetaminoph            Yes            1000mg      1,000 mg,          

 Univers



     en        6-10                               Oral, Q8H,           ity of



     (TYLENOL)      03:00:                               First dose           Te

[place]



     tablet      00                                 on Thu           Medical



     1,000 mg                                         22 at           Branch



                                                  2200,           



                                                  Until           



                                                  Discontinu           



                                                  ed,            



                                                  Routine           

 

     insulin            Yes       [id number]       inject 52           U

nivers



     glargine      6-10                               Units           ity of



     100 unit/mL      00:00:                               under the           [place]

[place]



     injection      00                                 skin at           Medical



                                                  bedtime.           Branch

 

     Insulin      2022- Yes       [id number]           Use as           Uni

vers



     Safety      6-10 07-09                          directed           ity of



     [place],      00:00: 04:59                                         [place]



     Disp, 29      00   :00                                          Medical



     gauge x                                                        Branch



     316" Ndle                                                        

 

     fluconazole      2022- Yes       [id number] 200mg      Take 1         

  [facility]



     200 mg      6-10 06-23                          tablet by           ity of



     tablet      00:00: 04:59                          mouth           [place]



               00   :00                           daily for           Medical



                                                  12 days.           Branch

 

     magnesium      2022- No             4g        4 g, IV           Univ

ers



     sulfate in                                Piggyback,           it

y of



     water 4      20:45: 21:58                          ONCE, 1           [place]



     gram/50 mL      00   :00                           dose, On           Medic

al



     (8 %) IV                                         Thu 22           Branc

h



     Piggyback 4                                         at 1545,           



     g                                            Routine           

 

     HYDROmorpho            Yes            4mg       4 mg,           Unive

rs



     ne                                       Oral,           ity of



     (DILAUDID)      19:32:                               Q6HPRN,           [place]

[place]



     tablet 4 mg      47                                 Starting           Medi

sarah



                                                  on Thu           Branch



                                                  22 at           



                                                  1432,           



                                                  Until           



                                                  Discontinu           



                                                  ed,            



                                                  Routine,           



                                                  Pain           



                                                  (scale           



                                                  7-10)           

 

     insulin            Yes            30U       30 Units,           Unive

rs



     glargine                                     Subcutaneo           ity o

f



     (LANTUS      02:00:                               us, QHS,           [place]



     U-100)      00                                 First dose           Medical



     injection                                         on Wed           Branch



     30 Units                                         22 at           



                                                  2100,           



                                                  Until           



                                                  Discontinu           



                                                  ed,            



                                                  Routine           

 

     HYDROmorpho      2022- No             .5mg      0.5 mg,           Un

[facility]



     ne        09                          Slow IV           ity of



     (DILAUDID)      16:28: 19:33                          Push,           [place]



     injection      03   :06                           Q4HPRN,           Medical



     0.5 mg                                         Starting           Branch



                                                  on 22 at           



                                                  1128,           



                                                  Until u           



                                                  22 at           



                                                  1433,           



                                                  Routine,           



                                                  Pain           



                                                  (scale           



                                                  7-10)<br>U           



                                                  se             



                                                  approved           



                                                  by             



                                                  (Faculty):           



                                                  ADC            



                                                  PROVIDER           

 

     fluconazole            Yes            200mg      200 mg,           Un

[facility]



     (DIFLUCAN)                                     Oral,           ity of



     tablet 200      14:45:                               DAILY,           [place]



     mg        00                                 First dose           Medical



                                                  on Wed           Branch



                                                  22 at           



                                                  0945,           



                                                  Until           



                                                  Discontinu           



                                                  ed,            



                                                  ASAP<br>Re           



                                                  ason for           



                                                  Anti-Infec           



                                                  tive:           



                                                  Empiric           



                                                  Therapy           



                                                  for            



                                                  Suspected           



                                                  Infection<           



                                                  br>Empiric           



                                                  Therapy           



                                                  Site:           



                                                  Urine<br>D           



                                                  uration of           



                                                  therapy:           



                                                  72 hours           

 

     enoxaparin            Yes            40mg      40 mg,           Unive

rs



     (LOVENOX)                                     Subcutaneo           ity 

of



     injection      14:00:                               us, DAILY,           [place]

[place]



     40 mg      00                                 First dose           Medical



                                                  on Wed           Branch



                                                  22 at           



                                                  0900,           



                                                  Until           



                                                  Discontinu           



                                                  ed,            



                                                  Routine           

 

     tamsulosin            Yes            .4mg      0.4 mg,           Univ

ers



     (FLOMAX)                                     Oral,           ity of



     capsule 0.4      14:00:                               DAILY,           [place]

[place]



     mg        00                                 First dose           Medical



                                                  on Wed           Branch



                                                  22 at           



                                                  0900,           



                                                  Until           



                                                  Discontinu           



                                                  ed,            



                                                  Routine           

 

     insulin      2022- No             10U       10 Units,           Univ

ers



     glargine      6-08 06-08                          Subcutaneo           ity 

of



     (LANTUS      12:00: 12:51                          us, ONCE,           [place]

[place]



     U-100)      00   :00                           1 dose, On           Medical



     injection                                         22           [facility]

ch



     10 Units                                         at 0700,           



                                                  Routine           

 

     HYDROmorpho      2022- No             1mg       1 mg, Slow          

 [facility]



     ne                                  IV Push,           ity of



     (DILAUDID)      10:00: 09:23                          ONCE, 1           [facility]

as



     injection 1      00   :00                           dose, On           Medi

[facility]



     mg                                           22           Branch



                                                  at 0500,           



                                                  Routine<br           



                                                  >Use           



                                                  approved           



                                                  by             



                                                  (Faculty):           



                                                  ADC            



                                                  PROVIDER           

 

     HYDROmorpho      2022- No             4mg       4 mg,           Univ

ers



     ne                                  Oral,           ity of



     (DILAUDID)      08:40: 16:28                          [id number],           [facility]

[place]



     tablet 4 mg      04   :14                           Starting           Medi

sar[facility]



                                                  on Wed           Branch



                                                  22 at           



                                                  0340,           



                                                  Until 22 at           



                                                  1128,           



                                                  Routine,           



                                                  Pain           



                                                  (scale           



                                                  7-10)           

 

     HYDROmorpho      2022- No             1mg       1 mg, Slow          

 [facility]



     ne                                  IV Push,           ity of



     (DILAUDID)      06:15: 05:41                          ONCE, 1           Eric

as



     injection 1      00   :00                           dose, On           Medi

sar[facility]



     mg                                           22           Branch



                                                  at 0115,           



                                                  Routine<br           



                                                  >Use           



                                                  approved           



                                                  by             



                                                  (Faculty):           



                                                  ADC            



                                                  PROVIDER           

 

     aztreonam      2022- No             1000mg      1,000 mg,           

[facility]



     (AZACTAM)      6-08 06-10                          IV             ity of



     1,000 mg in      05:30: 16:58                          Piggyback,          

 [place]



     NaCl 0.9%      00   :42                           Q8H ABX,           Medica

l



     (NS) 100 mL                                         First dose           Br

anch



     MINI-BAG                                         on 22 at           



                                                  0030,           



                                                  Until           



                                                  Discontinu           



                                                  ed,            



                                                  Administer           



                                                  over 30           



                                                  Minutes,           



                                                  100            



                                                  mL<br>Reas           



                                                  on for           



                                                  Anti-Infec           



                                                  tive:           



                                                  Empiric           



                                                  Therapy           



                                                  for            



                                                  Suspected           



                                                  Infection<           



                                                  br>Empiric           



                                                  Therapy           



                                                  Site:           



                                                  Urine<br>D           



                                                  uration of           



                                                  therapy: 7           



                                                  days           

 

     Sliding            Yes                      Subcutaneo           Univ

ers



     Scale      6-08                               us, Q4H,           ity of



     Insulin-Reg      05:00:                               First dose           

[place]



     ular + Fsbg      00                                 on Wed           Medica

l



     Testing                                         22 at           Branch



                                                  0000,           



                                                  Until           



                                                  Discontinu           



                                                  ed,            



                                                  Routine           

 

     NaCl 0.9%      2022- No             1000mL      at 125           Uni

vers



     (NS) IV      -08                          mL/hr, IV           ity of



     infusion      04:30: 16:28                          Infusion,           Eric

as



     1,000 mL      00   :27                           CONTINUOUS           Medic

al



                                                  , Starting           Branch



                                                  on 22 at           



                                                  2330,           



                                                  Until 22 at           



                                                  1128,           



                                                  Routine           

 

     glucagon            Yes            1mg       1 mg,           [facility]



     (GLUCAGEN                                     Intravenou           ity 

of



     DIAGNOSTIC      04:10:                               s, PRN -           Eric

as



     KIT)      53                                 SEE            Medical



     injection 1                                         INSTRUCTIO           Br

anch



     mg                                           NS,            



                                                  Starting           



                                                  on 22 at           



                                                  2310,           



                                                  Until           



                                                  Discontinu           



                                                  ed,            



                                                  Routine,           



                                                  For Blood           



                                                  glucose <           



                                                  70             

 

     proCHLORper            Yes            10mg      10 mg,           Univ

ers



     azine                                     Slow IV           ity of



     (COMPAZINE)      04:10:                               Push,           [place]



     injection      40                                 Q6HPRN,           Medical



     10 mg                                         Starting           Branch



                                                  on 22 at           



                                                  2310,           



                                                  Until           



                                                  Discontinu           



                                                  ed,            



                                                  Routine,           



                                                  Nausea and           



                                                  Vomiting           



                                                  (N/V)           

 

     FENTanyl PF      2022- No             50ug      50 mcg,           Un

[facility]



     (SUBLIMAZE                                Slow IV           ity o

f



     (PF))      03:04: 03:37                          Push,           [place]



     injection      00   :00                           ONCE, 1           Medical



     50 mcg                                         dose, On           22           



                                                  at 2215,           



                                                  STAT           

 

     NaCl 0.9%      2022- No             1000mL      at 999           Uni

vers



     (NS) IV      08                          mL/hr,           ity of



     infusion      01:16: 01:54                          Intravenou           Te

[place]



     1,000 mL      00   :00                           s, ONCE, 1           Medic

al



                                                  dose, On           22           



                                                  at 2030,           



                                                  Routine           

 

     insulin      2022- No             .1U/kg      13.7 Units           U

nivers



     regular      08                          (rounded           ity of



     human      01:15: 01:51                          from 13.74           [place]



     (HUMULIN R)      00   :00                           Units =           Medic

al



     injection                                         0.1            Branch



     13.7 Units                                         Units/kg           



                                                  ?137.4           



                                                  kg), Slow           



                                                  IV Push,           



                                                  ONCE, 1           



                                                  dose, On           



                                                  22           



                                                  at 2030,           



                                                  STAT           

 

     FENTanyl PF      0 - No             50ug      50 mcg,           Un

[facility]



     (SUBLIMAZE      -08                          Slow IV           ity o

f



     (PF))      01:00: 01:52                          Push,           [place]



     injection      00   :00                           ONCE, 1           Medical



     50 mcg                                         dose, On           Branch



                                                  22           



                                                  at 2015,           



                                                  ASAP           

 

     NaCl 0.9%      2022- No             1000mL      at 999           Uni

vers



     (NS) IV      -08                          mL/hr,           ity of



     infusion      23:47: 00:53                          Intravenou           Te

[place]



     1,000 mL      00   :00                           s, ONCE, 1           Medic

al



                                                  dose, On           Branch



                                                  22           



                                                  at 1900,           



                                                  ASAP           

 

     hydromorpho      0      Yes            4ug       Take 4 mcg           

[facility]



     ne HCl      6-07                               by mouth           ity of



     (HYDROMORPH      23:17:                               every 12           Te

[facility]



     ONE ORAL)      17                                 (twelve)           Medica

l



                                                  hours.           Branch

 

     hydromorpho            Yes            4ug       Take 4 mcg           

[facility]



     ne HCl      6-06                               by mouth           ity of



     (HYDROMORPH      18:29:                               every 12           Te

[facility]



     ONE ORAL)      31                                 (twelve)           Medica

l



                                                  hours.           Branch

 

     insulin            Yes        52U       inject 52           U

nivers



     glargine      6-06                               Units           ity of



     100 unit/mL      00:00:                               under the           T

[facility]



     injection      00                                 skin at           Medical



                                                  bedtime.           Branch

 

     insulin            Yes        15U       inject 15           U

nivers



     lispro,      6-06                               Units           ity of



     human, 100      00:00:                               under the           Te

[facility]



     unit/mL      00                                 skin 3           Medical



     injection                                         (three)           Branch



                                                  times           



                                                  daily           



                                                  before           



                                                  meals.           

 

     insulin            Yes        52U       inject 52           U

nivers



     glargine      6-06                               Units           ity of



     100 unit/mL      00:00:                               under the           T

[facility]



     injection      00                                 skin at           Medical



                                                  bedtime.           Branch

 

     insulin            Yes        15U       inject 15           U

nivers



     lispro,      6-06                               Units           ity of



     human, 100      00:00:                               under the           Te

[facility]



     unit/mL      00                                 skin 3           Medical



     injection                                         (three)           Branch



                                                  times           



                                                  daily           



                                                  before           



                                                  meals.           

 

     insulin      0      Yes        15U       inject 15           U

nivers



     lispro,      6-06                               Units           ity of



     human, 100      00:00:                               under the           Te

[facility]



     unit/mL      00                                 skin 3           Medical



     injection                                         (three)           Branch



                                                  times           



                                                  daily           



                                                  before           



                                                  meals.           

 

     insulin      2022- No        [id number] 52U       inject 52           

[facility]



     glargine       06-10                          Units           ity of



     100 unit/mL      00:00: 00:00                          under the           

[place]



     injection      00   :00                           skin at           Medical



                                                  bedtime.           Branch

 

     HYDROmorpho      2022- No             .5mg      0.5 mg,           Un

[facility]



     ne         06-06                          Slow IV           ity of



     (DILAUDID)      18:15: 18:14                          Push,           [place]



     injection      00   :00                           Q8HPRN,           Medical



     0.5 mg                                         Starting           Branch



                                                  on Sun           



                                                  22 at           



                                                  1315,           



                                                  Until Mon           



                                                  22 at           



                                                  1314,           



                                                  Routine,           



                                                  Pain           



                                                  (scale           



                                                  7-10)<br>U           



                                                  se             



                                                  approved           



                                                  by             



                                                  (Faculty):           



                                                  [facility]            



                                                  PROVIDER           

 

     insulin            Yes            .4U/kg/      52 Units           Uni

vers



     glargine      6-05                     d         (rounded           ity of



     (LANTUS      14:43:                               from 51.6           [place]



     U-100)      43                                 Units =           Medical



     injection                                         0.4            [facility]



     52 Units                                         Units/kg/d           



                                                  ay ?129           



                                                  kg),           



                                                  Subcutaneo           



                                                  us, Q24H,           



                                                  First dose           



                                                  on Sun           



                                                  22 at           



                                                  0944,           



                                                  Until           



                                                  Discontinu           



                                                  ed,            



                                                  Routine           

 

     HYDROmorpho            Yes            4mg       4 mg,           Unive

rs



     ne        6-05                               Oral,           ity of



     (DILAUDID)      02:21:                               Q6HPRN,           [place]

[facility]



     tablet 4 mg      55                                 Starting           Medi

sarah



                                                  on Sat           Branch



                                                  22 at           



                                                  ,           



                                                  Until           



                                                  Discontinu           



                                                  ed,            



                                                  Routine,           



                                                  Pain           



                                                  (scale           



                                                  4-6)           

 

     HYDROmorpho      2022- No             1mg       1 mg, Slow          

 [facility]



     ne         06-05                          IV Push,           ity of



     (DILAUDID)      02:21: 18:05                          Q4HPRN,           [facility]

as



     injection 1      39   :59                           Starting           Medi

sarah



     mg                                           on Sat           [facility]



                                                  22 at           



                                                  ,           



                                                  Until Sun           



                                                  22 at           



                                                  1305,           



                                                  Routine,           



                                                  Pain           



                                                  (scale           



                                                  7-10)<br>U           



                                                  se             



                                                  approved           



                                                  by             



                                                  (Faculty):           



                                                  [facility]            



                                                  PROVIDER           

 

     Sliding            Yes                      Subcutaneo           Univ

ers



     Scale      6-04                               us, Q4H,           ity of



     Insulin -      17:00:                               First dose           [place]

[place]



     lispro      00                                 on Sat           Medical



     (humaLOG) +                                         22 at           [facility]



     Fsbg                                         1200,           



     Testing                                         Until           



                                                  Discontinu           



                                                  ed,            



                                                  Routine           

 

     insulin      -0      Yes            .35U/kg      15 Units           Uni

vers



     lispro      6-04                     /d        (rounded           ity of



     (human)      17:00:                               from 15.05           [place]

[facility]



     (HumaLOG      00                                 Units =           Medical



     U-100)                                         0.35           [facility]



     injection                                         Units/kg/d           



     15 Units                                         ay ?129           



                                                  kg),           



                                                  Subcutaneo           



                                                  us, TID           



                                                  MEALS,           



                                                  First dose           



                                                  on Sat           



                                                  22 at           



                                                  1200,           



                                                  Until           



                                                  Discontinu           



                                                  ed,            



                                                  Routine           

 

     proMETHazin            Yes            25mg      25 mg,           Univ

ers



     e                                        Oral,           ity of



     (PHENERGAN)      15:09:                               Q6HPRN,           Eric

as



     tablet 25      59                                 Starting           Medica

l



     mg                                           on Sat           Branch



                                                  22 at           



                                                  1009,           



                                                  Until           



                                                  Discontinu           



                                                  ed,            



                                                  Routine,           



                                                  Nausea and           



                                                  Vomiting           



                                                  (N/V)           

 

     HYDROmorpho      2022- No             4mg       4 mg,           Univ

ers



     ne         06-05                          Oral,           ity of



     (DILAUDID)      14:47: 02:22                          Q6HPRN,           Eric

as



     tablet 4 mg      19   :08                           Starting           Medi

sarah



                                                  on Sat           Branch



                                                  22 at           



                                                  0947,           



                                                  Until Sat           



                                                  22 at           



                                                  2122,           



                                                  Routine,           



                                                  Pain           



                                                  (scale           



                                                  7-10)           

 

     potassium      2022- No             20meq      20 mEq, IV           

[facility]



     chloride 20                                Piggyback,           i

ty of



     mEq/100 mL      23:45: 03:11                          Q2H, 2           [place]

[facility]



     (KCL) 20      00   :00                           doses,           Medical



     mEq/100 mL                                         First dose           [facility]



     RTU IVPB 20                                         on Fri           



     mEq                                          6/3/22 at           



                                                  1845, Last           



                                                  dose on           



                                                  Fri 6/3/22           



                                                  at 2000,           



                                                  100 mL           

 

     HYDROmorpho      2022- No             1mg       1 mg, Slow          

 [facility]



     ne                                  IV Push,           ity of



     (DILAUDID)      18:08: 14:45                          Q4HPRN,           [facility]

as



     injection 1      31   :34                           Starting           Medi

sarah



     mg                                           on Fri           [facility]anch



                                                  6/3/22 at           



                                                  1308,           



                                                  Until Sat           



                                                  22 at           



                                                  0945,           



                                                  Routine,           



                                                  Pain           



                                                  (scale           



                                                  7-10)<br>U           



                                                  se             



                                                  approved           



                                                  by             



                                                  (Faculty):           



                                                  [facility]            



                                                  PROVIDER           

 

     enoxaparin            Yes            40mg      40 mg,           Unive

rs



     (LOVENOX)                                     Subcutaneo           ity 

of



     injection      14:00:                               us, DAILY,           [facility]

[place]



     40 mg      00                                 First dose           Medical



                                                  on Fri           Branch



                                                  6/3/22 at           



                                                  0900,           



                                                  Until           



                                                  Discontinu           



                                                  ed,            



                                                  Routine           

 

     lactobacill            Yes            .5mg      0.5 mg,           Uni

vers



     us                                       Oral, BID,           ity of



     acidophilus      13:00:                               First dose           

[place]



     tablet 0.5      00                                 on Fri           Medical



     mg                                           6/3/22 at           Branch



                                                  0800,           



                                                  Until           



                                                  Discontinu           



                                                  ed,            



                                                  Routine           

 

     docusate            Yes            100mg      100 mg,           Unive

rs



     (COLACE)                                     Oral, BID,           ity o

f



     capsule 100      13:00:                               First dose           

[place]



     mg        00                                 on Fri           Medical



                                                  6/3/22 at           Branch



                                                  0800,           



                                                  Until           



                                                  Discontinu           



                                                  ed,            



                                                  Routine           

 

     cefTRIAXone       No             1000mg      1,000 mg,         

  [facility]



     (ROCEPHIN)      03                          IV             ity of



     1,000 mg in      11:30: 16:04                          Piggyback,          

 [place]



     NaCl 0.9%      00   :40                           Q24H ABX,           Medic

al



     (NS) 50 mL                                         First dose           Bra

nch



     MINI-BAG                                         on Fri           



                                                  6/3/22 at           



                                                  0630,           



                                                  Until           



                                                  Discontinu           



                                                  ed,            



                                                  Administer           



                                                  over 30           



                                                  Minutes,           



                                                  50             



                                                  mL<br>Reas           



                                                  on for           



                                                  Anti-Infec           



                                                  tive:           



                                                  Documented           



                                                  Infection<           



                                                  br>Documen           



                                                  huma            



                                                  Infection           



                                                  Site:           



                                                  Urine<br>D           



                                                  uration of           



                                                  Therapy: 7           



                                                  days           

 

     proMETHazin      2022- No             25mg      25 mg, IV           

[facility]



     e          06-04                          Piggyback,           ity of



     (PHENERGAN)      11:22: 15:10                          Q6HPRN,           [facility]

[place]



     25 mg in      38   :15                           Starting           Medical



     NaCl 0.9%                                         on Fri           Branch



     (NS) 50 mL                                         6/3/22 at           



     IV                                           0622,           



     piggyback                                         Until Sat           



                                                  22 at           



                                                  1010,           



                                                  Routine,           



                                                  Nausea and           



                                                  Vomiting           



                                                  (N/V)           

 

     acetaminoph            Yes            650mg      650 mg,           Un

[facility]



     en                                       Oral,           ity of



     (TYLENOL)      11:04:                               Q6HPRN,           [place]



     tablet 650      03                                 Starting           Medic

al



     mg                                           on Fri           Branch



                                                  6/3/22 at           



                                                  0604,           



                                                  Until           



                                                  Discontinu           



                                                  ed,            



                                                  Routine,           



                                                  Pain           



                                                  (scale           



                                                  1-3), Temp           



                                                  > 38.5 C           

 

     HYDROmorpho       No             2mg       2 mg, Slow          

 [facility]



     ne                                  IV Push,           ity of



     (DILAUDID)      11:03: 18:08                          Q4HPRN,           Eric

as



     injection 2      22   :44                           Starting           Medi

sarah



     mg                                           on Fri           [facility]



                                                  6/3/22 at           



                                                  0603,           



                                                  Until Fri           



                                                  6/3/22 at           



                                                  1308,           



                                                  Routine,           



                                                  Pain           



                                                  (scale           



                                                  7-10)<br>U           



                                                  se             



                                                  approved           



                                                  by             



                                                  (Faculty):           



                                                  [facility]            



                                                  PROVIDER           

 

     NaCl 0.9%            Yes            10mL      10 mL,           Univer

s



     (NS)                                     Slow IV           ity of



     injection      10:58:                               Push, PRN,           Te

[place]



     10 mL      34                                 Starting           Medical



                                                  on Fri           Branch



                                                  6/3/22 at           



                                                  0558,           



                                                  Until           



                                                  Discontinu           



                                                  ed,            



                                                  Routine,           



                                                  line           



                                                  maintenanc           



                                                  e              

 

     NaCl 0.45%      2022- No             1000mL                     Univ

ers



     (1/2NS)                                               ity of



     1000 mL +      10:30: 14:45                                         [place]



     KCL 20 mEq      00   :34                                          Medical



                                                                 Branch

 

     D5W 0.45%       No                       IV             [facility]



     NaCl                                Infusion,           ity of



     (1/2NS) 1 L      10:21: 14:45                          at 200           Eric

as



     + KCL 20      33   :34                           mL/hr, PRN           Medic

al



     mEq                                          - SEE           Branch



                                                  INSTRUCTIO           



                                                  NS,            



                                                  Starting           



                                                  on Fri           



                                                  6/3/22 at           



                                                  0521,           



                                                  Until Sat           



                                                  22 at           



                                                  0945,           



                                                  ASAP,           



                                                  Blood           



                                                  glucose           



                                                  control           

 

     acetaminoph      2022- No             1000mg      1,000 mg,         

  [facility]



     en                                  Oral,           ity of



     (TYLENOL)      08:45: 07:36                          ONCE, 1           [place]

[facility]



     tablet      00   :00                           dose, On           Medical



     1,000 mg                                         Fri 6/3/22           Br[facility]

h



                                                  at 0345,           



                                                  ASAP           

 

     insulin      2022- No             8U        8 Units,           Unive

rs



     regular                                Slow IV           ity of



     human      08:15: 07:13                          Push,           [place]



     (HUMULIN R)      00   :00                           ONCE, 1           Medic

al



     injection 8                                         dose, On           [facility]

ch



     Units                                         Fri 6/3/22           



                                                  at 0315,           



                                                  Routine           

 

     NaCl 0.9%       No             1000mL      at 999           Uni

vers



     (NS) bolus                                mL/hr,           ity of



     infusion      07:15: 06:12                          1,000 mL,           Eric

as



     1,000 mL      00   :00                           IV             Medical



                                                  Infusion,           Branch



                                                  ONCE, 1           



                                                  dose, On           



                                                  Fri 6/3/22           



                                                  at 0215,           



                                                  STAT           

 

     insulin       No             8U        8 Units,           Unive

rs



     regular                                Slow IV           ity of



     human      07:15: 06:21                          Push,           [place]



     (HUMULIN R)      00   :00                           ONCE, 1           Medic

al



     injection 8                                         dose, On           [facility]

ch



     Units                                         Fri 6/3/22           



                                                  at 0215,           



                                                  STAT           

 

     iopamidol       No        [id number] 100mL      100 mL,          

 [facility]



     (ISOVUE                                Intravenou           ity o

f



     370-500 mL)      05:45: 04:31                          s, ONCE, 1          

 [place]



     injection      00   :00                           dose, On           Medica

l



     100 mL                                         Fri 6/3/22           [facility]



                                                  at 0045,           



                                                  Routine           

 

     meropenem       No             500mg      500 mg, IV           

[facility]



     (MERREM)                                Piggyback,           ity 

of



     500 mg in      05:00: 05:32                          ONCE, 1           [place]

s



     NaCl 0.9%      00   :00                           dose, On           Medica

l



     (NS) 50 mL                                         Fri 6/3/22           [facility]

[facility]



     MINI-BAG                                         at 0000,           



                                                  Administer           



                                                  over 30           



                                                  Minutes,           



                                                  50             



                                                  mL<br&gt;R           



                                                  estricted           



                                                  use            



                                                  approved           



                                                  by:            



                                                  EMERGENCY           



                                                  DEPARTMENT           



                                                  PRESCRIBER           



                                                  <br>Reason           



                                                  for            



                                                  Anti-Infec           



                                                  tive:           



                                                  Empiric           



                                                  Therapy           



                                                  for            



                                                  Suspected           



                                                  Infection<           



                                                  br>Empiric           



                                                  Therapy           



                                                  Site:           



                                                  Abdominal<           



                                                  br>Duratio           



                                                  n of           



                                                  therapy:           



                                                  72 hours           

 

     proMETHazin      2022- No             25mg      25 mg, IV           

[facility]



     e                                   Piggyback,           ity of



     (PHENERGAN)      04:45: 03:49                          ONCE, 1           [facility]

[place]



     25 mg in      00   :00                           dose, On           Medical



     NaCl 0.9%                                         Thu 22           [facility]

ch



     (NS) 50 mL                                         at 2345,           



     IV                                           ASAP           



     piggyback                                                        

 

     NaCl 0.9%      2022- No             1000mL      at 999           Uni

vers



     (NS) bolus      -03                          mL/hr,           ity of



     infusion      04:30: 06:04                          1,000 mL,           [facility]

as



     1,000 mL      00   :00                           IV             Medical



                                                  Infusion,           [facility]



                                                  ONCE, 1           



                                                  dose, On           



                                                  u 22           



                                                  at 2330,           



                                                  STAT           

 

     FENTanyl PF      2022- No             100ug      100 mcg,           

[facility]



     (SUBLIMAZE                                Slow IV           ity o

f



     (PF))      03:30: 03:24                          Push,           [place]



     injection      00   :00                           ONCE, 1           Medical



     100 mcg                                         dose, On           [facility]



                                                  Thu 22           



                                                  at 2230,           



                                                  STAT           

 

     cefTRIAXone            Yes            2000mg      2,000 mg,          

 [facility]



     (ROCEPHIN)      5-17                               Intramuscu           ity

 of



     injection      21:00:                               lar, Q24H,           [facility]

[place]



     2,000 mg      00                                 First dose           Medic

al



                                                  on Tue           [place]



                                                  22 at           



                                                  1600,           



                                                  Until           



                                                  Discontinu           



                                                  ed,            



                                                  ASAP<br>Re           



                                                  ason for           



                                                  Anti-Infec           



                                                  tive:           



                                                  Documented           



                                                  Infection<           



                                                  br>Documen           



                                                  huma            



                                                  Infection           



                                                  Site: Skin           



                                                  / Soft           



                                                  Tissue<br>           



                                                  Duration           



                                                  of             



                                                  Therapy:           



                                                  Other (see           



                                                  Comments)           

 

     hydromorpho            Yes            4ug       Take 4 mcg           

[facility]



     ne HCl      5-17                               by mouth           ity of



     (HYDROMORPH      17:43:                               every 12           Te

[facility]



     ONE ORAL)      55                                 (twelve)           Medica

l



                                                  hours.           [place]

 

     hydromorpho            Yes            4ug       Take 4 mcg           

[facility]



     ne HCl      5-17                               by mouth           ity of



     (HYDROMORPH      17:43:                               every 12           Te

[place]



     ONE ORAL)      55                                 (twelve)           Med[facility]

l



                                                  hours.           [place]

 

     collagenase            Yes       [id number]           Apply  to        

   [facility]



     250       5-17                               affected           ity of



     unit/gram      00:00:                               area(s)           [place]



     ointment      00                                 daily.           [facility]

 

     collagenase            Yes       [id number]           Apply  to        

   [facility]



     250       5-17                               affected           ity of



     unit/gram      00:00:                               area(s)           [place]



     ointment      00                                 daily.           [facility]

 

     collagenase            Yes       [id number]           Apply  to        

   [facility]



     250       5-17                               affected           ity of



     unit/gram      00:00:                               area(s)           [place]



     ointment      00                                 daily.           [facility]

 

     collagenase            Yes       [id number]           Apply  to        

   [facility]



     250       5-17                               affected           ity of



     unit/gram      00:00:                               area(s)           [place]



     ointment      00                                 daily.           Medical



                                                                 [facility]

 

     collagenase            Yes       [id number]           Apply  to        

   [facility]



     250       5-17                               affected           ity of



     unit/gram      00:00:                               area(s)           [place]



     ointment      00                                 daily.           Medical



                                                                 [facility]

 

     docusate      2022- Yes       [id number] 100mg      Take 1           U

nivers



     100 mg      5-17 06-17                          capsule by           ity of



     capsule      00:00: 04:59                          mouth 2           [place]



               00   :00                           (two)           Medical



                                                  times           [facility]



                                                  daily for           



                                                  30 days.           

 

     lactobacill      2022- Yes       [id number] .5mg      Take 1          

 [facility]



     us        5-17 06-17                          tablet by           ity of



     acidophilus      00:00: 04:59                          mouth 2           [facility]

[place]



               00   :00                           (two)           Medical



                                                  times           [facility]



                                                  daily for           



                                                  30 days.           

 

     sennosides      2022- Yes       [id number] 8.6mg      Take 1          

 [facility]



     8.6 mg      5-17 06-17                          tablet by           ity of



     tablet      00:00: 04:59                          mouth 2           [place]



               00   :00                           (two)           Medical



                                                  times           [facility]



                                                  daily for           



                                                  30 days.           

 

     tamsulosin      2022- Yes       [id number] .4mg      Take 1           

[facility]



     0.4 mg 24      5-17 06-17                          capsule by           ity

 of



     hr capsule      00:00: 04:59                          mouth           [place]



               00   :00                           daily for           Medical



                                                  30 days.           [facility]

 

     docusate      2022- Yes       [id number] 100mg      Take 1           U

nivers



     100 mg      5-17 06-17                          capsule by           ity of



     capsule      00:00: 04:59                          mouth 2           [place]



               00   :00                           (two)           Medical



                                                  times           [facility]



                                                  daily for           



                                                  30 days.           

 

     lactobacill      2022- Yes       [id number] .5mg      Take 1          

 [facility]



     us        5-17 06-17                          tablet by           ity of



     acidophilus      00:00: 04:59                          mouth 2           [facility]

[facility]



               00   :00                           (two)           Medical



                                                  times           [facility]



                                                  daily for           



                                                  30 days.           

 

     sennosides      2022- Yes       [id number] 8.6mg      Take 1          

 [facility]



     8.6 mg      5-17 06-17                          tablet by           ity of



     tablet      00:00: 04:59                          mouth 2           [place]



               00   :00                           (two)           Medical



                                                  times           [facility]



                                                  daily for           



                                                  30 days.           

 

     tamsulosin      2022- Yes       [id number] .4mg      Take 1           

[facility]



     0.4 mg 24      5-17 06-17                          capsule by           ity

 of



     hr capsule      00:00: 04:59                          mouth           [place]



               00   :00                           daily for           Medical



                                                  30 days.           [facility]

 

     docusate      2022- Yes       [id number] 100mg      Take 1           U

nivers



     100 mg      5-17 06-17                          capsule by           ity of



     capsule      00:00: 04:59                          mouth 2           [place]



               00   :00                           (two)           Medical



                                                  times           [facility]



                                                  daily for           



                                                  30 days.           

 

     lactobacill      2022- Yes       [id number] .5mg      Take 1          

 [facility]



     us        5-17 06-17                          tablet by           ity of



     acidophilus      00:00: 04:59                          mouth 2           [facility]

[facility]



               00   :00                           (two)           Medical



                                                  times           [facility]



                                                  daily for           



                                                  30 days.           

 

     sennosides      2022- Yes       [id number] 8.6mg      Take 1          

 [facility]



     8.6 mg      5-17 06-17                          tablet by           ity of



     tablet      00:00: 04:59                          mouth 2           [place]



               00   :00                           (two)           Medical



                                                  times           [facility]



                                                  daily for           



                                                  30 days.           

 

     tamsulosin      2022- Yes       [id number] .4mg      Take 1           

[facility]



     0.4 mg 24      5-17 06-17                          capsule by           ity

 of



     hr capsule      00:00: 04:59                          mouth           [place]



               00   :00                           daily for           Medical



                                                  30 days.           [facility]

 

     docusate      2022- Yes       [id number] 100mg      Take 1           U

nivers



     100 mg      5-17 06-17                          capsule by           ity of



     capsule      00:00: 04:59                          mouth 2           [place]



               00   :00                           (two)           Medical



                                                  times           [facility]



                                                  daily for           



                                                  30 days.           

 

     lactobacill      2022- Yes       [id number] .5mg      Take 1          

 [facility]



     us        5-17 06-17                          tablet by           ity of



     acidophilus      00:00: 04:59                          mouth 2           [facility]

[facility]



               00   :00                           (two)           Medical



                                                  times           [facility]



                                                  daily for           



                                                  30 days.           

 

     sennosides      2022- Yes       [id number] 8.6mg      Take 1          

 [facility]



     8.6 mg      5-17 06-17                          tablet by           ity of



     tablet      00:00: 04:59                          mouth 2           [place]



               00   :00                           (two)           Medical



                                                  times           [facility]



                                                  daily for           



                                                  30 days.           

 

     tamsulosin      2022- Yes       [id number] .4mg      Take 1           

[facility]



     0.4 mg 24      5-17 06-17                          capsule by           ity

 of



     hr capsule      00:00: 04:59                          mouth           [place]



               00   :00                           daily for           Medical



                                                  30 days.           [facility]

 

     docusate      2022- Yes       [id number] 100mg      Take 1           U

nivers



     100 mg      5-17 06-17                          capsule by           ity of



     capsule      00:00: 04:59                          mouth 2           [place]



               00   :00                           (two)           Medical



                                                  times           [facility]



                                                  daily for           



                                                  30 days.           

 

     lactobacill      2022- Yes       [id number] .5mg      Take 1          

 [facility]



     us        5-17 06-17                          tablet by           ity of



     acidophilus      00:00: 04:59                          mouth 2           [facility]

[facility]



               00   :00                           (two)           Medical



                                                  times           [facility]



                                                  daily for           



                                                  30 days.           

 

     sennosides      2022- Yes       [id number] 8.6mg      Take 1          

 [facility]



     8.6 mg      5-17 06-17                          tablet by           ity of



     tablet      00:00: 04:59                          mouth 2           [place]



               00   :00                           (two)           Medical



                                                  times           [facility]



                                                  daily for           



                                                  30 days.           

 

     tamsulosin      2022- Yes       [id number] .4mg      Take 1           

[facility]



     0.4 mg 24      5-17 06-17                          capsule by           ity

 of



     hr capsule      00:00: 04:59                          mouth           [place]



               00   :00                           daily for           Medical



                                                  30 days.           [facility]

 

     metroNIDAZO      2022- Yes       [id number] 500mg      Take 1         

  [facility]



      mg      5-17 05-21                          tablet by           ity 

of



     tablet      00:00: 04:59                          mouth           [place]



               00   :00                           every 12           Medical



                                                  (twelve)           [facility]



                                                  hours for           



                                                  3 days.           

 

     metroNIDAZO      2022- Yes       [id number] 500mg      Take 1         

  [facility]



      mg      5-17 05-21                          tablet by           ity 

of



     tablet      00:00: 04:59                          mouth           [place]



               00   :00                           every 12           Medical



                                                  (twelve)           [facility]



                                                  hours for           



                                                  3 days.           

 

     metroNIDAZO      2022- Yes            500mg      500 mg,           U

nivers



     LE (FLAGYL)      5-15 05-20                          Oral, Q12H           i

ty of



     tablet 500      22:00: 21:59                          ABX, 10           [facility]

as



     mg        00   :00                           doses,           Medical



                                                  First dose           [facility]



                                                  on Sun           



                                                  5/15/22 at           



                                                  1700, Last           



                                                  dose on           



                                                  22 at           



                                                  0500,           



                                                  Routine<br           



                                                  >Reason           



                                                  for            



                                                  Anti-Infec           



                                                  tive:           



                                                  Documented           



                                                  Infection<           



                                                  br>Documen           



                                                  huma            



                                                  Infection           



                                                  Site: Skin           



                                                  / Soft           



                                                  Tissue<br>           



                                                  Duration           



                                                  of             



                                                  Therapy:           



                                                  Other (see           



                                                  Comments)           

 

     cefTRIAXone      2022- No             2g        2 g, IV           Un

[facility]



     (ROCEPHIN)      5-15 05-17                          Piggyback,           it

y of



     2 g in NaCl      20:00: 20:03                          Q24H ABX,           

[place]



     0.9% (NS)      00   :21                           3 doses,           [facility]

l



     100 mL                                         First dose           [facility]



     MINI-BAG                                         on Sun           



                                                  5/15/22 at           



                                                  1500, Last           



                                                  dose on           



                                                  22 at           



                                                  1500,           



                                                  Administer           



                                                  over 30           



                                                  Minutes,           



                                                  100            



                                                  mL<br>Reas           



                                                  on for           



                                                  Anti-Infec           



                                                  tive:           



                                                  Documented           



                                                  Infection<           



                                                  br>Documen           



                                                  huma            



                                                  Infection           



                                                  Site: Skin           



                                                  / Soft           



                                                  Tissue<br>           



                                                  Duration           



                                                  of             



                                                  Therapy:           



                                                  Other (see           



                                                  Comments)           

 

     enoxaparin            Yes            30mg      30 mg,           Unive

rs



     (LOVENOX)                                     Subcutaneo           ity 

of



     injection      01:00:                               us, Q12H,           [facility]

as



     30 mg      00                                 First dose           Medical



                                                  on Wed           [facility]



                                                  22 at           



                                                  2000,           



                                                  Until           



                                                  Discontinu           



                                                  ed,            



                                                  Routine           

 

     proMETHazin            Yes            25mg      25 mg, IV           U

nivers



     e                                        Piggyback,           ity of



     (PHENERGAN)      22:58:                               Q4HPRN,           [facility]

as



     25 mg in      13                                 Starting           Medical



     NaCl 0.9%                                         on Wed           [facility]



     (NS) 50 mL                                         22 at           



     IV                                           1758,           



     piggyback                                         Until           



                                                  Discontinu           



                                                  ed,            



                                                  Routine,           



                                                  Nausea and           



                                                  Vomiting           



                                                  (N/V)           

 

     collagenase            Yes                      Topical           Uni

vers



     (SANTYL)                                     (Apply To           ity of



     ointment      22:15:                               Affected           [place]



               00                                 Areas),           Medical



                                                  DAILY,           [facility]



                                                  First dose           



                                                  on 22 at           



                                                  1715,           



                                                  Until           



                                                  Discontinu           



                                                  ed,            



                                                  Routine           

 

     HYDROmorpho      2022- No             1mg       1 mg, Slow          

 [facility]



     ne                                  IV Push,           ity of



     (DILAUDID)      21:45: 21:25                          ONCE, 1           [facility]

as



     injection 1      00   :00                           dose, On           Medi

sarah



     mg                                           Wed            [facility]



                                                  22 at           



                                                  1645,           



                                                  Routine<br           



                                                  >Use           



                                                  approved           



                                                  by             



                                                  (Faculty):           



                                                  ADC            



                                                  PROVIDER           

 

     magnesium      2022- No             2g        2 g, IV           Univ

ers



     sulfate in                                Piggyback,           it

y of



     water 2      15:15: 16:11                          Administer           [facility]

as



     gram/50 mL      00   :00                           over 60           [facility]

l



     (4 %)                                         Minutes,           [facility]



     infusion 2                                         ONCE, 1           



     g                                            dose, On           



                                                  22 at           



                                                  1015,           



                                                  Routine           

 

     lactulose      0      Yes            15mL      15 mL,           Univer

s



     (CEPHULAC)                                     Oral,           ity of



     solution 15      14:00:                               DAILY,           [place]

[place]



     mL        00                                 First dose           Medical



                                                  on Wed           [facility]



                                                  22 at           



                                                  0900,           



                                                  Until           



                                                  Discontinu           



                                                  ed,            



                                                  Routine           

 

     vancomycin      0 - No             125mg      125 mg,           Un

[facility]



     (FIRVANQ)      16                          Oral,           ity of



     50 mg/mL      14:00: 16:27                          Q24H,           [place]



     oral      00   :02                           First dose           Medical



     solution                                         on Wed           [facility]



     125 mg                                         22 at           



                                                  0900,           



                                                  Until           



                                                  Discontinu           



                                                  ed,            



                                                  Routine<br           



                                                  >Reason           



                                                  for            



                                                  Anti-Infec           



                                                  tive:           



                                                  [facility]           



                                                  Non-Surgic           



                                                  al             



                                                  Prophylaxi           



                                                  s<br>Durat           



                                                  ion of           



                                                  therapy: 7           



                                                  days           

 

     sennosides            Yes            8.6mg      8.6 mg,           Uni

vers



     (SENOKOT)                                     Oral, BID,           ity 

of



     tablet 8.6      13:00:                               First dose           [facility]

[facility]



     mg        00                                 on Wed           Medical



                                                  22 at           [facility]



                                                  0800,           



                                                  Until           



                                                  Discontinu           



                                                  ed,            



                                                  Routine           

 

     docusate      0      Yes            100mg      100 mg,           Unive

rs



     (COLACE)                                     Oral, BID,           ity o

f



     capsule 100      13:00:                               First dose           

[place]



     mg        00                                 on Wed           Medical



                                                  22 at           [facility]



                                                  0800,           



                                                  Until           



                                                  Discontinu           



                                                  ed,            



                                                  Routine           

 

     HYDROmorpho      0      Yes            2mg       2 mg,           Unive

rs



     ne                                       Oral, TID,           ity of



     (DILAUDID)      13:00:                               First dose           T

[facility]



     tablet 2 mg      00                                 on Wed           [facility]

l



                                                  22 at           [facility]



                                                  0800,           



                                                  Until           



                                                  Discontinu           



                                                  ed             

 

     lactobacill      0      Yes            .5mg      0.5 mg,           Uni

vers



     us                                       Oral, BID,           ity of



     acidophilus      13:00:                               First dose           

[place]



     tablet 0.5      00                                 on Wed           Medical



     mg                                           22 at           [facility]



                                                  0800,           



                                                  Until           



                                                  Discontinu           



                                                  ed,            



                                                  Routine           

 

     ceFEPIme      0 - No             2g        2 g, IV           Unive

rs



     (MAXIPIME)      -15                          Piggyback,           it

y of



     2 g in NaCl      11:00: 19:23                          Q8H ABX,           T

[facility]



     0.9% (NS)      00   :40                           First dose           Medi

[facility]



     100 mL                                         on 



     MINI-BAG                                         22 at           



                                                  0600,           



                                                  Until           



                                                  Discontinu           



                                                  ed,            



                                                  Administer           



                                                  over 30           



                                                  Minutes,           



                                                  100            



                                                  mL<br>Reas           



                                                  on for           



                                                  Anti-Infec           



                                                  tive:           



                                                  Empiric           



                                                  Therapy           



                                                  for            



                                                  Suspected           



                                                  Infection<           



                                                  br&gt;Empi           



                                                  alda            



                                                  Therapy           



                                                  Site:           



                                                  Bone<br>Du           



                                                  ration of           



                                                  therapy:           



                                                  72 hours           

 

     NaCl 0.9%      2022- No             1000mL      at 50           Univ

ers



     (NS) IV       05-16                          mL/hr, IV           ity of



     infusion      10:00: 16:41                          Infusion,           [facility]

as



     1,000 mL      00   :28                           CONTINUOUS           Medic

al



                                                  , Starting           [facility]



                                                  on 22 at           



                                                  0500,           



                                                  Until 22 at           



                                                  1141,           



                                                  Routine           

 

     doxycycline       No             100mg      100 mg, IV         

  [facility]



     (VIBRAMYCIN      14                          Piggyback,           i

ty of



     ) 100 mg in      09:15: 23:11                          Q12H ABX,           

[place]



     NaCl 0.9%      00   :48                           First dose           Medi

[facility]



     (NS) 100 mL                                         on 



     MINI-BAG                                         22 at           



                                                  0415,           



                                                  Until           



                                                  Discontinu           



                                                  ed,            



                                                  Administer           



                                                  over 60           



                                                  Minutes,           



                                                  100            



                                                  mL<br>Reas           



                                                  on for           



                                                  Anti-Infec           



                                                  tive:           



                                                  Empiric           



                                                  Therapy           



                                                  for            



                                                  Suspected           



                                                  Infection<           



                                                  br>Empiric           



                                                  Therapy           



                                                  Site:           



                                                  Wound<br>D           



                                                  uration of           



                                                  therapy: 7           



                                                  days           

 

     tamsulosin            Yes            .4mg      0.4 mg,           Univ

ers



     (FLOMAX)                                     Oral,           ity of



     capsule 0.4      08:30:                               DAILY,           [place]

[place]



     mg        00                                 First dose           Medical



                                                  on Wed           [facility]



                                                  22 at           



                                                  0330,           



                                                  Until           



                                                  Discontinu           



                                                  ed,            



                                                  Routine           

 

     HYDROmorpho            Yes            1mg       1 mg, Slow           

[facility]



     ne                                       IV Push,           ity of



     (DILAUDID)      06:04:                               Q4HPRN,           [place]

[facility]



     injection 1      07                                 Starting           Medi

sar[facility]



     mg                                           on Wed           [facility]



                                                  22 at           



                                                  0104,           



                                                  Until           



                                                  Discontinu           



                                                  ed,            



                                                  Routine,           



                                                  Pain           



                                                  (scale           



                                                  7-10)<br>U           



                                                  se             



                                                  approved           



                                                  by             



                                                  (Faculty):           



                                                  ADC            



                                                  PROVIDER           

 

     FENTanyl PF      2022- No             50ug      50 mcg,           Un

[facility]



     (SUBLIMAZE                                Slow IV           ity o

f



     (PF))      05:30: 05:01                          Push,           [place]



     injection      00   :00                           ONCE, 1           Medical



     50 mcg                                         dose, On           [facility]



                                                  22 at           



                                                  0030, STAT           

 

     iopamidol      2022- No        [id number] 150mL      150 mL,         

  [facility]



     (ISOVUE                                Intravenou           ity o

f



     370-500 mL)      04:45: 03:34                          s, ONCE, 1          

 [place]



     injection      00   :00                           dose, On           Med[facility]

l



     150 mL                                         e            [facility]



                                                  5/10/22 at           



                                                  2345,           



                                                  Routine           

 

     ceFEPIme      2022- No             2000mg      2,000 mg,           U

nivers



     (MAXIPIME)                                IV             ity of



     injection      04:15: 04:15                          Piggyback,           T

[facility]



     2,000 mg      00   :00                           ONCE, 1           Medical



                                                  dose, On           [facility]



                                                  e            



                                                  5/10/22 at           



                                                  2315,           



                                                  STAT<br>Re           



                                                  ason for           



                                                  Anti-Infec           



                                                  tive:           



                                                  Empiric           



                                                  Therapy           



                                                  for            



                                                  Suspected           



                                                  Infection<           



                                                  br>Empiric           



                                                  Therapy           



                                                  Site:           



                                                  Bone<br>Du           



                                                  ration of           



                                                  therapy:           



                                                  72 hours           

 

     FENTanyl PF      2022- No             75ug      75 mcg,           Un

[facility]elyn



     (SUBLIMAZE                                Slow IV           ity o

f



     (PF))      03:45: 02:47                          Push,           [place]



     injection      00   :00                           ONCE, 1           Medical



     75 mcg                                         dose, On           [facility]



                                                  Tue            



                                                  5/10/22 at           



                                                  2245, STAT           

 

     OXYCODONE      2022- No                       Take  by           Uni

vers



     HCL/ACETAMI                                mouth.           ity o

f



     NOPHEN      03:22: 00:00                                         [place]



     (PERCOCET      50   :00                                          Medical



     ORAL)                                                        [place]

 

     FENTanyl PF      2022- No             100ug      100 mcg,           

[facility]



     (SUBLIMAZE                                Slow IV           ity o

f



     (PF))      01:30: 01:12                          Push,           [place]



     injection      00   :00                           ONCE, 1           Medical



     100 mcg                                         dose, On           [facility]



                                                  e            



                                                  5/10/22 at           



                                                  2030, STAT           

 

     NaCl 0.9%      2022- No             1000mL      at 999           Uni

vers



     (NS) bolus      4-04 04-04                          mL/hr,           ity of



     infusion      05:00: 05:59                          1,000 mL,           [facility]

as



     1,000 mL      00   :00                           IV             Medical



                                                  Piggyback,           [facility]



                                                  ONCE, 1           



                                                  dose, On           



                                                  Mon 22           



                                                  at 0000,           



                                                  STAT           

 

     diphenhydrA      2022- No             25mg      25 mg,           Uni

vers



     MINE                                Slow IV           ity of



     (BENADRYL)      05:00: 04:47                          Push,           [place]



     injection      00   :00                           ONCE, 1           Medical



     25 mg                                         dose, On           [facility]



                                                  Mon 22           



                                                  at 0000,           



                                                  STAT           

 

     haloperidol      2022- No             2.5mg      2.5 mg,           U

nivers



     lactate                                Intravenou           ity o

f



     (HALDOL)      05:00: 04:47                          s, ONCE, 1           [facility]

[place]



     injection      00   :00                           dose, On           Medica

l



     2.5 mg                                         22           [facility]



                                                  at 0000,           



                                                  STAT           

 

     butalbital-      2022- No             2{tbl}      2 tablet,         

  [facility]



     acetaminoph      3-18 03-18                          Oral,           ity of



     en-caff      03:45: 03:10                          ONCE, 1           [place]



     (ESGIC)      00   :00                           dose, On           Medical



     -40                                         Thu            [facility]



     mg tablet 2                                         3/17/22 at           



     tablet                                         2245, ASAP           

 

     NaCl 0.9%       No             500mL      at 999           Un[facility]

ers



     (NS) bolus      3-18 03-18                          mL/hr, 500           it

y of



     infusion      02:30: 03:17                          mL, IV           [place]



     500 mL      00   :00                           Infusion,           Medical



                                                  ONCE, 1           [facility]



                                                  dose, On           



                                                  u            



                                                  3/17/22 at           



                                                  2130, STAT           

 

     diphenhydrA       No             25mg      25 mg,           Uni

vers



     MINE      3-18 03-18                          Slow IV           ity of



     (BENADRYL)      02:30: 01:46                          Push,           [place]



     injection      00   :00                           ONCE, 1           Medical



     25 mg                                         dose, On           [facility]



                                                  u            



                                                  3/17/22 at           



                                                  2130, STAT           

 

     magnesium      2022- No             2g        2 g, IV           Univ

ers



     sulfate in      3-18 03-18                          Piggyback,           it

y of



     water 2      02:15: 03:17                          Administer           Eric

as



     gram/50 mL      00   :00                           over 30           Medica

l



     (4 %)                                         Minutes,           [facility]



     infusion 2                                         ONCE, 1           



     g                                            dose, On           



                                                  u            



                                                  3/17/22 at           



                                                  2115,           



                                                  Routine           

 

     HYDROmorpho       No             .5mg      0.5 mg,           Un

[facility]



     ne                                  Slow IV           ity of



     (DILAUDID)      01:30: 01:25                          Push,           [place]



     injection      00   :00                           ONCE, 1           Medical



     0.5 mg                                         dose, On           [facility]



                                                  22 at           



                                                  1930,           



                                                  Routine<br           



                                                  >Use           



                                                  approved           



                                                  by             



                                                  (Faculty):           



                                                  ADC            



                                                  PROVIDER           

 

     levoFLOXaci      2022- No        [id number] 750mg      Take 1        

   [facility]



     n 750 mg                                tablet by           ity o

f



     tablet      00:00: 05:59                          mouth           [place]



               00   :00                           daily for           Medical



                                                  4 days.           [place]

 

     levoFLOXaci      2022- No        [id number] 750mg      Take 1        

   [facility]



     n 750 mg                                tablet by           ity o

f



     tablet      00:00: 05:59                          mouth           [place]



               00   :00                           daily for           Medical



                                                  4 days.           [place]

 

     OXYCODONE            Yes                      Take  by           [facility]

ers



     HCL/ACETAMI      -25                               mouth.           ity of



     NOPHEN      19:49:                                              [place]



     (PERCOCET      27                                                Medical



     ORAL)                                                        [place]

 

     OXYCODONE            Yes                      Take  by           [facility]

ers



     HCL/ACETAMI      1-25                               mouth.           ity of



     NOPHEN      19:49:                                              [place]



     (PERCOCET      27                                                Medical



     ORAL)                                                        [place]

 

     OXYCODONE            Yes                      Take  by           [facility]

ers



     HCL/ACETAMI      1-25                               mouth.           ity of



     NOPHEN      19:49:                                              [place]



     (PERCOCET      27                                                Medical



     ORAL)                                                        [place]

 

     OXYCODONE            Yes                      Take  by           [facility]

ers



     HCL/ACETAMI      1-25                               mouth.           ity of



     NOPHEN      19:49:                                              [place]



     (PERCOCET      27                                                Medical



     ORAL)                                                        [place]

 

     vancomycin      2022- No             125mg      125 mg,           Un

[facility]



     (VANCOCIN)                                Oral, QID,           it

y of



     capsule 125      18:00: 21:59                          25 doses,           

[place]



     mg        00   :00                           First dose           Medical



                                                  (after           [facility]



                                                  last           



                                                  modificati           



                                                  on) on 22 at           



                                                  1200, Last           



                                                  dose on           



                                                  22 at           



                                                  1200,           



                                                  ASAP<br>Fa           



                                                  culty           



                                                  member           



                                                  approving           



                                                  Non-formul           



                                                  maryanne            



                                                  medication           



                                                  :              



                                                  MEGADC<br&           



                                                  gt;Reason           



                                                  for            



                                                  non-formul           



                                                  maryanne use:           



                                                  SPECIFIC           



                                                  INDICATION           



                                                  FOR            



                                                  NONFORMULA           



                                                  RY             



                                                  PRODUCT<br           



                                                  >Reason           



                                                  for            



                                                  Anti-Infec           



                                                  tive:           



                                                  Documented           



                                                  Infection<           



                                                  br>Documen           



                                                  huma            



                                                  Infection           



                                                  Site:           



                                                  Abdominal<           



                                                  br>Duratio           



                                                  n of           



                                                  Therapy:           



                                                  14 days           

 

     levoFLOXaci            Yes            750mg      750 mg,           Un

[facility]



     n         1-25                               Oral,           ity of



     (LEVAQUIN)      15:00:                               DAILY,           [place]



     tablet 750      00                                 First dose           Med

ical



     mg                                           (after           Branch



                                                  last           



                                                  modificati           



                                                  on) on 22 at           



                                                  0900,           



                                                  Until           



                                                  Discontinu           



                                                  ed,            



                                                  ASAP<br>Re           



                                                  ason for           



                                                  Anti-Infec           



                                                  tive:           



                                                  Empiric           



                                                  Therapy           



                                                  for            



                                                  Suspected           



                                                  Infection<           



                                                  br>Empiric           



                                                  Therapy           



                                                  Site:           



                                                  Urine<br>D           



                                                  uration of           



                                                  therapy:           



                                                  72 hours           

 

     lactobacill      2022- No        [id number] .5mg      Take 1         

  [facility]



     us                                  tablet by           ity of



     acidophilus      00:00: 05:59                          mouth 2           Te

[facility]



               00   :00                           (two)           Medical



                                                  times           [facility]



                                                  daily for           



                                                  30 days.           

 

     lactobacill      2022- No        [id number] .5mg      Take 1         

  [facility]



     us                                  tablet by           ity of



     acidophilus      00:00: 05:59                          mouth 2           Te

[place]



               00   :00                           (two)           Medical



                                                  times           [facility]



                                                  daily for           



                                                  30 days.           

 

     vancomycin      2022- No        [id number] 125mg      Take 1         

  [facility]



     125 mg                                capsule by           ity of



     capsule      00:00: 05:59                          mouth 4           [place]



               00   :00                           (four)           Medical



                                                  times           [facility]



                                                  daily for           



                                                  5 days.           

 

     vancomycin      2022- No        [id number] 125mg      Take 1         

  [facility]



     125 mg                                capsule by           ity of



     capsule      00:00: 05:59                          mouth 4           [place]



               00   :00                           (four)           Medical



                                                  times           [facility]



                                                  daily for           



                                                  5 days.           

 

     traMADoL            Yes            50mg      50 mg,           [facility]



     (ULTRAM)      -24                               Oral,           ity of



     tablet 50      21:26:                               Q6HPRN,           [place]



     mg        10                                 Starting           Medical



                                                  on 22 at           



                                                  1526,           



                                                  Until           



                                                  Discontinu           



                                                  ed,            



                                                  Routine,           



                                                  Pain           



                                                  (scale           



                                                  4-6)           

 

     levoFLOXaci      2022- No             250mg      250 mg,           U

nivers



     n         --24                          Oral, Q24H           ity of



     (LEVAQUIN)      19:30: 23:18                          ABX, First           

[place]



     tablet 250      00   :17                           dose           Medical



     mg                                           (after           [facility]



                                                  last           



                                                  modificati           



                                                  on) on Mon           



                                                  22 at           



                                                  1330,           



                                                  Until           



                                                  Discontinu           



                                                  ed,            



                                                  ASAP<br>Re           



                                                  ason for           



                                                  Anti-Infec           



                                                  tive:           



                                                  Empiric           



                                                  Therapy           



                                                  for            



                                                  Suspected           



                                                  Infection<           



                                                  br>Empiric           



                                                  Therapy           



                                                  Site:           



                                                  Urine<br>D           



                                                  uration of           



                                                  therapy:           



                                                  72 hours           

 

     HYDROmorpho      2022- No             .5mg      0.5 mg,           Un

[facility]



     ne                                  Slow IV           ity of



     (DILAUDID)      20:45: 18:23                          Push,           [place]



     injection      00   :44                           Q6HPRN,           Medical



     0.5 mg                                         Starting           [facility]



                                                  on Sun           



                                                  22 at           



                                                  1445,           



                                                  Until Mon           



                                                  22 at           



                                                  1223,           



                                                  Routine,           



                                                  Pain           



                                                  (scale           



                                                  7-10),           



                                                  breakthrou           



                                                  gh             



                                                  pain<br>Us           



                                                  e approved           



                                                  by             



                                                  (Faculty):           



                                                  ADC            



                                                  PROVIDER           

 

     oxyCODONE-a            Yes            2{tbl}      2 tablet,          

 [facility]



     cetaminophe                                     Oral,           ity of



     n         20:39:                               Q6HPRN,           [place]



     (PERCOCET)      03                                 Starting           Medic

al



     5-325 mg                                         on Sun           [facility]



     per tablet                                         22 at           



     2 tablet                                         1439,           



                                                  Until           



                                                  Discontinu           



                                                  ed,            



                                                  Routine,           



                                                  Pain           



                                                  (scale           



                                                  7-10)           

 

     HYDROmorpho      2022- No             .5mg      0.5 mg,           Un

[facility]



     ne                                  Slow IV           ity of



     (DILAUDID)      00:00: 23:41                          Push,           [place]



     injection      00   :00                           ONCE, 1           Medical



     0.5 mg                                         dose, On           [facility]



                                                  Sat            



                                                  22 at           



                                                  1800,           



                                                  Routine<br           



                                                  >Use           



                                                  approved           



                                                  by             



                                                  (Faculty):           



                                                  ADC            



                                                  PROVIDER           

 

     ertapenem      2022- No             1000mg      1,000 mg,           

[facility]



     (INVANZ)                                IV             ity of



     1,000 mg in      15:45: 18:30                          Piggyback,          

 [place]



     NaCl 0.9%      00   :00                           Q24H ABX,           Medic

al



     (NS) 50 mL                                         First dose           [facility]

[facility]



     MINI-BAG                                         on Sat           



                                                  22 at           



                                                  0945,           



                                                  Until           



                                                  Discontinu           



                                                  ed,            



                                                  Administer           



                                                  over 30           



                                                  Minutes,           



                                                  50             



                                                  mL<br>Reas           



                                                  on for           



                                                  Anti-Infec           



                                                  tive:           



                                                  Empiric           



                                                  Therapy           



                                                  for            



                                                  Suspected           



                                                  Infection<           



                                                  br>Empiric           



                                                  Therapy           



                                                  Site:           



                                                  Urine<br>D           



                                                  uration of           



                                                  therapy:           



                                                  72             



                                                  hours<br>R           



                                                  estricted           



                                                  use            



                                                  approved           



                                                  by: ADC           



                                                  PROVIDER           

 

     lactobacill            Yes            .5mg      0.5 mg,           Uni

vers



     us                                       Oral, BID,           ity of



     acidophilus      02:00:                               First dose           

[place]



     tablet 0.5      00                                 on Fri           Medical



     mg                                           22 at           [facility]



                                                  2000,           



                                                  Until           



                                                  Discontinu           



                                                  ed,            



                                                  Routine           

 

     vancomycin      2022- No             250mg      250 mg,           Un

[place]



     (VANCOCIN)                                Oral, QID,           it

y of



     capsule 250      02:00: 16:20                          First dose          

 [place]



     mg        00   :40                           (after           Medical



                                                  last           [facility]



                                                  reorder)           



                                                  on Fri           



                                                  22 at           



                                                  2000,           



                                                  Until           



                                                  Discontinu           



                                                  ed,            



                                                  ASAP&lt;br           



                                                  >Faculty           



                                                  member           



                                                  approving           



                                                  Non-formul           



                                                  maryanne            



                                                  medication           



                                                  :              



                                                  MEGADC<br>           



                                                  Reason for           



                                                  non-formul           



                                                  maryanne use:           



                                                  SPECIFIC           



                                                  INDICATION           



                                                  FOR            



                                                  NONFORMULA           



                                                  RY             



                                                  PRODUCT<br           



                                                  >Reason           



                                                  for            



                                                  Anti-Infec           



                                                  tive:           



                                                  Documented           



                                                  Infection<           



                                                  br>Documen           



                                                  huma            



                                                  Infection           



                                                  Site:           



                                                  Abdominal<           



                                                  br>Duratio           



                                                  n of           



                                                  Therapy:           



                                                  14 days           

 

     vancomycin      2022- No             250mg      250 mg,           Un

[place]



     (VANCOCIN)                                Oral,           ity of



     capsule 250      00:45: 00:34                          ONCE, 1           [place]

[place]



     mg        00   :00                           dose, On           Medical



                                                  Fri            [facility]



                                                  22 at           



                                                  1845,           



                                                  ASAP<br>Fa           



                                                  culty           



                                                  member           



                                                  approving           



                                                  Non-formul           



                                                  maryanne            



                                                  medication           



                                                  :              



                                                  MEGADC<br>           



                                                  Reason for           



                                                  non-formul           



                                                  maryanne use:           



                                                  SPECIFIC           



                                                  INDICATION           



                                                  FOR            



                                                  NONFORMULA           



                                                  RY             



                                                  PRODUCT<br           



                                                  >Reason           



                                                  for            



                                                  Anti-Infec           



                                                  tive:           



                                                  Documented           



                                                  Infection<           



                                                  br>Documen           



                                                  huma            



                                                  Infection           



                                                  Site:           



                                                  Abdominal<           



                                                  br>Duratio           



                                                  n of           



                                                  Therapy:           



                                                  14 days           

 

     lidocaine      2022- No             5mL       5 mL,           Univer

s



     1% (PF)                                Subcutaneo           ity o

f



     (XYLOCAINE)      23:45: 02:25                          us, ONCE,           

[place]



     injection 5      00   :00                           1 dose, On           Me

dical



     mL                                           Fri            [facility]



                                                  22 at           



                                                  1745,           



                                                  Routine           

 

     NaCl 0.9%            Yes            10mL      10 mL,           Univer

s



     (NS)                                     Slow IV           ity of



     injection      23:38:                               Push, PRN,           [facility]

[place]



     10 mL      41                                 Starting           Medical



                                                  on Fri           [facility]



                                                  22 at           



                                                  1738,           



                                                  Until           



                                                  Discontinu           



                                                  ed,            



                                                  Routine,           



                                                  line           



                                                  maintenanc           



                                                  e              

 

     meropenem      2022- No             1g        1 g, IV           Univ

ers



     (MERREM)                           Piggyback,           it

y of



     g in NaCl      23:15: 14:33                          Q8H ABX,           Eric

as



     0.9% (NS)      00   :35                           First dose           Medi

[facility]



     100 mL                                         (after           [facility]



     MINI-BAG                                         last           



                                                  modificati           



                                                  on) on u           



                                                  22 at           



                                                  1715,           



                                                  Until           



                                                  Discontinu           



                                                  ed,            



                                                  Administer           



                                                  over 60           



                                                  Minutes,           



                                                  100            



                                                  mL<br>Rest           



                                                  ricted use           



                                                  approved           



                                                  by: ADC           



                                                  PROVIDER<b           



                                                  r&gt;Reaso           



                                                  n for           



                                                  Anti-Infec           



                                                  tive:           



                                                  Documented           



                                                  Infection<           



                                                  br>Documen           



                                                  huma            



                                                  Infection           



                                                  Site:           



                                                  Urine<br>D           



                                                  uration of           



                                                  Therapy: 7           



                                                  days           

 

     enoxaparin            Yes            40mg      40 mg,           Unive

rs



     (LOVENOX)                                     Subcutaneo           ity 

of



     injection      23:00:                               us, DAILY,           [facility]

[place]



     40 mg      00                                 First dose           Medical



                                                  on Thu           [facility]



                                                  22 at           



                                                  1700,           



                                                  Until           



                                                  Discontinu           



                                                  ed,            



                                                  Routine           

 

     meropenem      2022- No             1g        1 g, IV           Univ

ers



     (MERREM)                           Piggyback,           it

y of



     g in NaCl      16:00: 20:33                          Q8H ABX,           Eric

as



     0.9% (NS)      00   :04                           First dose           Medi

[facility]



     100 mL                                         (after           [facility]



     MINI-BAG                                         last           



                                                  reorder)           



                                                  on u           



                                                  22 at           



                                                  1000,           



                                                  Until           



                                                  Discontinu           



                                                  ed,            



                                                  Administer           



                                                  over 60           



                                                  Minutes,           



                                                  100            



                                                  mL<br>Rest           



                                                  ricted use           



                                                  approved           



                                                  by: ADC           



                                                  PROVIDER<b           



                                                  r>Reason           



                                                  for            



                                                  Anti-Infec           



                                                  tive:           



                                                  Documented           



                                                  Infection<           



                                                  br>Documen           



                                                  huma            



                                                  Infection           



                                                  Site:           



                                                  Urine<br>D           



                                                  uration of           



                                                  Therapy:           



                                                  Other (see           



                                                  Comments)           

 

     HYDROmorpho      2022- No             .5mg      0.5 mg,           Un

[place]



     ne                                  Slow IV           ity of



     (DILAUDID)      14:29: 20:41                          Push,           [place]



     injection      58   :17                           Q4HPRN,           Medical



     0.5 mg                                         Starting           [facility]



                                                  on 22 at           



                                                  0829,           



                                                  Until Sun           



                                                  22 at           



                                                  1441,           



                                                  Routine,           



                                                  Pain           



                                                  (scale           



                                                  7-10)<br>U           



                                                  se             



                                                  approved           



                                                  by             



                                                  (Faculty):           



                                                  ADC            



                                                  PROVIDER           

 

     proMETHazin            Yes            25mg      25 mg,           Univ

ers



     e                                        Oral,           ity of



     (PHENERGAN)      09:42:                               Q4HPRN,           Eric

as



     tablet 25      07                                 Starting           Medica

l



     mg                                           on Thu           [facility]



                                                  22 at           



                                                  0342,           



                                                  Until           



                                                  Discontinu           



                                                  ed,            



                                                  Routine,           



                                                  Nausea and           



                                                  Vomiting           



                                                  (N/V)           

 

     HYDROmorpho      2022- No             .5mg      0.5 mg,           Un

[place]



     ne                                  Slow IV           ity of



     (DILAUDID)      09:41: 12:04                          Push,           [place]



     injection      36   :38                           Q4HPRN,           Medical



     0.5 mg                                         Starting           [facility]



                                                  on Thu           



                                                  22 at           



                                                  0341,           



                                                  Until Thu           



                                                  22 at           



                                                  0604,           



                                                  Routine,           



                                                  Pain           



                                                  (scale           



                                                  7-10)<br>U           



                                                  se             



                                                  approved           



                                                  by             



                                                  (Faculty):           



                                                  ADC            



                                                  PROVIDER           

 

     proCHLORper            Yes            10mg      10 mg,           Univ

ers



     azine                                     Slow IV           ity of



     (COMPAZINE)      09:07:                               Push,           [place]



     injection      27                                 Q6HPRN,           Medical



     10 mg                                         Starting           [facility]



                                                  on Thu           



                                                  22 at           



                                                  0307,           



                                                  Until           



                                                  Discontinu           



                                                  ed,            



                                                  Routine,           



                                                  Nausea and           



                                                  Vomiting           



                                                  (N/V)           

 

     acetaminoph            Yes            650mg      650 mg,           Un

[place]



     en                                       Oral,           ity of



     (TYLENOL)      09:07:                               Q6HPRN,           [place]



     tablet 650      10                                 Starting           Medic

al



     mg                                           on Thu           [facility]



                                                  22 at           



                                                  0307,           



                                                  Until           



                                                  Discontinu           



                                                  ed,            



                                                  Routine,           



                                                  Pain           



                                                  (scale           



                                                  1-3)           

 

     meropenem      2022- No             1g        1 g, IV           Univ

ers



     (MERREM)                           Piggyback,           it

y of



     g in NaCl      07:15: 08:49                          ONCE, 1           [place]

s



     0.9% (NS)      00   :00                           dose, On           Medica

l



     100 mL                                         Thu            [facility]



     MINI-BAG                                         22 at           



                                                  0115,           



                                                  Administer           



                                                  over 60           



                                                  Minutes,           



                                                  100            



                                                  mL<br>Rest           



                                                  ricted use           



                                                  approved           



                                                  by: ADC           



                                                  PROVIDER<b           



                                                  r>Reason           



                                                  for            



                                                  Anti-Infec           



                                                  tive:           



                                                  Documented           



                                                  Infection<           



                                                  br>Documen           



                                                  huma            



                                                  Infection           



                                                  Site:           



                                                  Urine<br>D           



                                                  uration of           



                                                  Therapy:           



                                                  Other (see           



                                                  Comments)           

 

     cefdinir      2022- No             300mg      300 mg,           Un[facility]

ers



     (OMNICEF)                                Oral,           ity of



     capsule 300      03:30: 02:55                          ONCE, 1           [facility]

[facility]



     mg        00   :00                           dose, On           Medical



                                                  Mon            Branch



                                                  22 at           



                                                  2130,           



                                                  ASAP<br>Re           



                                                  ason for           



                                                  Anti-Infec           



                                                  tive:           



                                                  Documented           



                                                  Infection<           



                                                  br>Documen           



                                                  huma            



                                                  Infection           



                                                  Site:           



                                                  Urine<br>D           



                                                  uration of           



                                                  Therapy: 7           



                                                  days           

 

     FENTanyl PF      2022- No             50ug      50 mcg,           Un

[facility]



     (SUBLIMAZE                                Slow IV           ity o

f



     (PF))      01:15: 03:26                          Push,           [place]



     injection      00   :00                           ONCE, 1           Medical



     50 mcg                                         dose, On           [facility]



                                                  Mon            



                                                  22 at           



                                                  1915, STAT           

 

     proMETHazin      2022- No             25mg      25 mg, IV           

[facility]



     e                                   Piggyback,           ity of



     (PHENERGAN)      01:15: 03:26                          ONCE, 1           [facility]

[facility]



     25 mg in      00   :00                           dose, On           Medical



     NaCl 0.9%                                         Mon            [facility]



     (NS) 50 mL                                         22 at           



     piggyback                                         1915, 50           



                                                  mL             

 

     cefdinir      2022- No        [id number] 300mg      Take 1           U

n[facility]



     300 mg                                capsule by           ity of



     capsule      00:00: 05:59                          mouth           [place]



               00   :00                           every 12           Medical



                                                  (twelve)           Branch



                                                  hours for           



                                                  10 days.           

 

     cefdinir      -0 - No        [id number] 300mg      Take 1           U

nivers



     300 mg                                capsule by           ity of



     capsule      00:00: 00:00                          mouth           [place]



               00   :00                           every 12           Medical



                                                  (twelve)           [facility]



                                                  hours for           



                                                  10 days.           

 

     FENTanyl PF      2022- No             50ug      50 mcg,           Un

[facility]



     (SUBLIMAZE                                Slow IV           ity o

f



     (PF))      02:00: 01:19                          Push,           [place]



     injection      00   :00                           ONCE, 1           Medical



     50 mcg                                         dose, On           [facility]



                                                  Sat            



                                                  1/15/22 at           



                                                  2000,           



                                                  Routine           

 

     methocarbam      2022- No             1000mg      1,000 mg,         

  [facility]



     oL                                  Oral,           ity of



     (ROBAXIN)      02:00: 01:19                          ONCE, 1           [place]

[place]



     tablet      00   :00                           dose, On           Medical



     1,000 mg                                         Sat            [facility]



                                                  1/15/22 at           



                                                  2000, ASAP           

 

     proMETHazin      2022- No             12.5mg      12.5 mg,          

 [facility]



     e         1-15 01-15                          IV             ity of



     (PHENERGAN)      22:46: 23:00                          Piggyback,          

 [place]



     12.5 mg in      00   :00                           ONCE, 1           Med[facility]

l



     NaCl 0.9%                                         dose, On           [facility]



     (NS) 50 mL                                         Sat            



     piggyback                                         1/15/22 at           



                                                  1700, 50           



                                                  mL             

 

     FENTanyl PF      2022- No             50ug      50 mcg,           Un

[facility]



     (SUBLIMAZE      1-15 01-15                          Slow IV           ity o

f



     (PF))      22:45: 23:00                          Push,           [place]



     injection      00   :00                           ONCE, 1           Medical



     50 mcg                                         dose, On           [facility]



                                                  Sat            



                                                  1/15/22 at           



                                                  1645,           



                                                  Routine           

 

     methocarbam            Yes       [id number] 1000mg      Take 2         

  [facility]



     oL 500 mg      1-15                               tablets by           ity 

of



     tablet      00:00:                               mouth 4           [place]



               00                                 (four)           Medical



                                                  times           [facility]



                                                  daily as           



                                                  needed for           



                                                  Pain           



                                                  (scale           



                                                  1-3).           

 

     methocarbam      -0      Yes       [id number] 1000mg      Take 2         

  [facility]



     oL 500 mg      1-15                               tablets by           ity 

of



     tablet      00:00:                               mouth 4           [place]



               00                                 (four)           Medical



                                                  times           [facility]



                                                  daily as           



                                                  needed for           



                                                  Pain           



                                                  (scale           



                                                  1-3).           

 

     methocarbam      -0      Yes       [id number] 1000mg      Take 2         

  [facility]



     oL 500 mg      1-15                               tablets by           ity 

of



     tablet      00:00:                               mouth 4           [place]



               00                                 (four)           Medical



                                                  times           [facility]



                                                  daily as           



                                                  needed for           



                                                  Pain           



                                                  (scale           



                                                  1-3).           

 

     methocarbam      -0      Yes       [id number] 1000mg      Take 2         

  [facility]



     oL 500 mg      1-15                               tablets by           ity 

of



     tablet      00:00:                               mouth 4           [place]



               00                                 (four)           Medical



                                                  times           [facility]



                                                  daily as           



                                                  needed for           



                                                  Pain           



                                                  (scale           



                                                  1-3).           

 

     methocarbam      -0      Yes       [id number] 1000mg      Take 2         

  [facility]



     oL 500 mg      1-15                               tablets by           ity 

of



     tablet      00:00:                               mouth 4           [place]



               00                                 (four)           Medical



                                                  times           [facility]



                                                  daily as           



                                                  needed for           



                                                  Pain           



                                                  (scale           



                                                  1-3).           

 

     methocarbam      -0      Yes       [id number] 1000mg      Take 2         

  [facility]



     oL 500 mg      1-15                               tablets by           ity 

of



     tablet      00:00:                               mouth 4           [place]



               00                                 (four)           Medical



                                                  times           [facility]



                                                  daily as           



                                                  needed for           



                                                  Pain           



                                                  (scale           



                                                  1-3).           

 

     methocarbam      2022- No        [id number] 1000mg      Take 2        

   [facility]



     oL 500 mg      1-15 05-11                          tablets by           ity

 of



     tablet      00:00: 00:00                          mouth 4           [place]



               00   :00                           (four)           Medical



                                                  times           [facility]



                                                  daily as           



                                                  needed for           



                                                  Pain           



                                                  (scale           



                                                  1-3).           

 

     proMETHazin      2021- No             25mg      25 mg, IV           

[facility]



     e         1-24                           Piggyback,           ity of



     (PHENERGAN)      23:15: 22:20                          ONCE, 1           [facility]

[facility]



     25 mg in      00   :00                           dose, On           Medical



     NaCl 0.9%                                         Wed            [facility]



     (NS) 50 mL                                         21           



     piggyback                                         at 1715,           



                                                  50 mL           

 

     FENTanyl PF      2021 No             75ug      75 mcg,           Un

[facility]



     (SUBLIMAZE                                Slow IV           ity o

f



     (PF))      22:15: 22:20                          Push,           [place]



     injection      00   :00                           ONCE, 1           Medical



     75 mcg                                         dose, On           [facility]



                                                  Wed            



                                                  21           



                                                  at 1615,           



                                                  Routine           

 

     fidaxomicin      2021      Yes       [id number] 200mg      Take 1          

 [facility]



     200 mg      1-24                               tablet by           ity of



     tablet      00:00:                               mouth 2           [place]



                                                (two)           Medical



                                                  times           [facility]



                                                  daily.           

 

     proMETHazin      2021      Yes       [id number] 25mg      Take 1           

[facility]



     e 25 mg      1-24                               tablet by           ity of



     tablet      00:00:                               mouth           [place]



               00                                 every 6           Medical



                                                  (six)           [facility]anch



                                                  hours as           



                                                  needed for           



                                                  Nausea and           



                                                  Vomiting           



                                                  (N/V).           

 

     fidaxomicin      2021      Yes       [id number] 200mg      Take 1          

 [facility]



     200 mg      1-24                               tablet by           ity of



     tablet      00:00:                               mouth 2           [place]



               00                                 (two)           Medical



                                                  times           [facility]



                                                  daily.           

 

     proMETHazin      2021      Yes       [id number] 25mg      Take 1           

[facility]



     e 25 mg      1-24                               tablet by           ity of



     tablet      00:00:                               mouth           [place]



               00                                 every 6           Medical



                                                  (six)           [facility]



                                                  hours as           



                                                  needed for           



                                                  Nausea and           



                                                  Vomiting           



                                                  (N/V).           

 

     cholestyram      2021      Yes                                     [facility]

s



     ine 4 gram      1-24                                              ity of



     packet      00:00:                                              [place]



               00                                                Medical



                                                                 [facility]

 

     fidaxomicin      2021-1      Yes       [id number] 200mg      Take 1          

 [facility]



     200 mg      1-24                               tablet by           ity of



     tablet      00:00:                               mouth 2           [place]



               00                                 (two)           Medical



                                                  times           [facility]



                                                  daily.           

 

     proMETHazin      2021      Yes       [id number] 25mg      Take 1           

[facility]



     e 25 mg      1-24                               tablet by           ity of



     tablet      00:00:                               mouth           [place]



               00                                 every 6           Medical



                                                  (six)           [facility]



                                                  hours as           



                                                  needed for           



                                                  Nausea and           



                                                  Vomiting           



                                                  (N/V).           

 

     cholestyram      2021      Yes                                     Univer

s



     ine 4 gram      1-24                                              ity of



     packet      00:00:                                              [place]



                                                               Medical



                                                                 [facility]

 

     cholestyram      2021      Yes                                     Univer

s



     ine 4 gram      1-24                                              ity of



     packet      00:00:                                              [place]



               00                                                Medical



                                                                 [facility]anch

 

     cholestyram      2021      Yes                                     Univer

s



     ine 4 gram      1-24                                              ity of



     packet      00:00:                                              [place]



                                                               Medical



                                                                 [facility]

 

     cholestyram      2021      Yes                                     Univer

s



     ine 4 gram      1-24                                              ity of



     packet      00:00:                                              [place]



               00                                                Medical



                                                                 [facility]anch

 

     cholestyram      2021      Yes                                     Univer

s



     ine 4 gram      1-24                                              ity of



     packet      00:00:                                              [place]



               00                                                Medical



                                                                 Branch

 

     fidaxomicin      2021      Yes       [id number] 200mg      Take 1          

 [facility]



     200 mg      1-24                               tablet by           ity of



     tablet      00:00:                               mouth 2           [place]



               00                                 (two)           Medical



                                                  times           [facility]



                                                  daily.           

 

     proMETHazin      2021      Yes       [id number] 25mg      Take 1           

[facility]



     e 25 mg      1-24                               tablet by           ity of



     tablet      00:00:                               mouth           [place]



               00                                 every 6           Medical



                                                  (six)           Branch



                                                  hours as           



                                                  needed for           



                                                  Nausea and           



                                                  Vomiting           



                                                  (N/V).           

 

     cholestyram      2021- No                                      Unive

rs



     ine 4 gram      1-24 05-11                                         ity of



     packet      00:00: 00:00                                         [place]



               00   :00                                          Medical



                                                                 Branch

 

     fidaxomicin      2021- No        [id number] 200mg      Take 1         

  [facility]



     200 mg      1-24 01-25                          tablet by           ity of



     tablet      00:00: 00:00                          mouth 2           [place]



               00   :00                           (two)           Medical



                                                  times           Branch



                                                  daily.           

 

     proMETHazin      2021- No        [id number] 25mg      Take 1          

 [facility]



     e 25 mg      1-24 01-25                          tablet by           ity of



     tablet      00:00: 00:00                          mouth           [place]



               00   :00                           every 6           Medical



                                                  (six)           Br[facility]



                                                  hours as           



                                                  needed for           



                                                  Nausea and           



                                                  Vomiting           



                                                  (N/V).           

 

     FENTanyl PF      2021- No             50ug      50 mcg,           Un

[facility]



     (SUBLIMAZE      5-10 05-10                          Intravenou           it

y of



     (PF))      02:45: 01:34                          s, ONCE, 1           [place]



     injection      00   :00                           dose, Sun           Medic

al



     50 mcg                                         21 at           Branch



                                                  2145,           



                                                  Routine           

 

     cefTRIAXone      2021- No             1000mg      1,000 mg,         

  [facility]



     (ROCEPHIN)      5-10 05-10                          IV             ity of



     1,000 mg in      02:30: 01:55                          Piggyback,          

 [place]



     NaCl 0.9%      00   :00                           ONCE, 1           Medical



     (NS) 50 mL                                         dose, [facility]

ch



     MINI-BAG                                         21 at           



                                                  2130, 50           



                                                  mL<br>Reas           



                                                  on for           



                                                  Anti-Infec           



                                                  tive:           



                                                  Documented           



                                                  Infection<           



                                                  br>Documen           



                                                  huma            



                                                  Infection           



                                                  Site:           



                                                  Urine<br>D           



                                                  uration of           



                                                  Therapy: 7           



                                                  days           

 

     famotidine      2021- No             20mg      20 mg,           Univ

ers



     (PEPCID      5-10 05-10                          Slow IV           ity of



     (PF))      01:00: 00:12                          Push,           [place]



     injection      00   :00                           ONCE, 1           Medical



     20 mg                                         dose, [facility]



                                                  21 at           



                                                  2000, ASAP           

 

     proMETHazin      2021- No             25mg      25 mg, IV           

Univers



     e         5-10 05-10                          Piggyback,           ity of



     (PHENERGAN)      00:45: 00:11                          ONCE, 1           Te

[facility]



     25 mg in      00   :00                           dose, Isabelle           Medica

l



     NaCl 0.9%                                         21 at           [facility]

h



     (NS) 50 mL                                         1945, 50           



     piggyback                                         mL             

 

     FENTanyl PF      2021- No             50ug      50 mcg,           Un

yoselyn



     (SUBLIMAZE      5-10 05-10                          Intravenou           it

y of



     (PF))      00:45: 00:12                          s, ONCE, 1           [place]



     injection      00   :00                           dose, Sun           Medic

al



     50 mcg                                         21 at           [facility]



                                                  1945,           



                                                  Routine           

 

     NaCl 0.9%      2021- No             1000mL      at 999           Uni

vers



     (NS) bolus      5-10 05-10                          mL/hr,           ity of



     infusion      00:00: 01:47                          1,000 mL,           Eric

as



     1,000 mL      00   :00                           IV             Medical



                                                  Infusion,           [place]



                                                  ONCE, 1           



                                                  dose, [place]           



                                                  21 at           



                                                  1900, ASAP           

 

     cefdinir      2021- No        [id number] 300mg      Take 1           U

nivers



     300 mg       05-20                          capsule by           ity of



     capsule      00:00: 04:59                          mouth 2           [place]



               00   :00                           (two)           Medical



                                                  times           [facility]



                                                  daily for           



                                                  10 days.           

 

     buPROPion      2021- No             150mg QD   Take 1           [facility]



     (WELLBUTRIN      1-17 01-16                          tablet           [facility]



     XL) 150 MG      00:00: 23:59                          (150 mg           Med

ical



     24 hr      00   :00                           total) by           Center



     tablet                                         mouth           



                                                  daily.           

 

     citalopram      2021- No             40mg QD   Take 1           [facility]



     (CELEXA) 40      1-17 -16                          tablet (40           [facility]

ukes



     MG tablet      00:00: 23:59                          mg total)           Me

dical



               00   :00                           by mouth           Center



                                                  daily.           

 

     oxyCODONE-a            Yes            1{tbl}      Take 1           [facility]



     cetaminophe      1-16                               tablet by           Ni

es



     n         14:44:                               mouth           Medical



     (PERCOCET)      50                                 every 4           Center



      mg                                         (four)           



     per tablet                                         hours as           



                                                  needed for           



                                                  Pain.           

 

     zinc            Yes            5g   Q.5D Apply 5 g           [facility]



     oxide-yuan      1-16                               topically           Ni

es



     latum      00:00:                               2 (two)           Medical



     (CRITIC-AID      00                                 times           Center



     ) 20-51 %                                         daily.           



     Pste                                                        



     topical                                                        



     paste                                                        

 

     meropenem            Yes            1g        Inject 1 g           [facility]



     (MERREM)      1-16                               intravenou           [facility]



     MBP 1 gm in      00:00:                               sly every           M

edical



     100 mL NS      00                                 8 (eight)           [facility]

r



                                                  hours.           

 

     insulin            Yes            58U  Q.5D Inject 58           [facility]

[facility]



     glargine      1-16                               Units           [facility]



     (LANTUS)      00:00:                               subcutaneo           Med

ical



     100 unit/mL      00                                 usly 2           Center



     injection                                         (two)           



                                                  times           



                                                  daily Use           



                                                  as             



                                                  directed.           

 

     insulin      2021- No             0U        Inject           [facility]



     lispro      1-16 01-15                          0-12 Units           [facility]



     (HUMALOG)      00:00: 23:59                          subcutaneo           M

edical



     100 unit/mL      00   :00                           usly 3           Center



     injection                                         (three)           



                                                  times           



                                                  daily           



                                                  before           



                                                  meals.           

 

     insulin      2021- No             25U       Inject 25           [facility]



     lispro      1-16 01-15                          Units           [facility]



     (HUMALOG)      00:00: 23:59                          subcutaneo           M

edical



     100 unit/mL      00   :00                           usly 3           Center



     injection                                         (three)           



                                                  times           



                                                  daily           



                                                  before           



                                                  meals.           

 

     normal      2018      No                       1,000 mL,           [facility]

a



     saline 0.9%                                     Rate: 100           l



     IV 1,000 mL      11:04:                               ml/hr,           [facility]

                                 Infuse           



                                                  over: 10           



                                                  hr, Route:           



                                                  IV, Dosing           



                                                  Weight           



                                                  131.818           



                                                  kg, Total           



                                                  Volume:           



                                                  1,000,           



                                                  Start           



                                                  date:           



                                                  18           



                                                  5:04:00           



                                                  CST,           



                                                  Duration:           



                                                  30 day,           



                                                  Stop date:           



                                                  18           



                                                  5:03:00           



                                                  CST, 2.4,           



                                                  m2             

 

     Magnesium      2018      No                       Notes:           [facility]

a



     Sulfate                                     WASTE: F/P           l



               10:36:                               - Sink; E           [place]                                 -              



                                                  Municipal           



                                                  Trash Bin           

 

     Isolyte S      2018      No                       Notes:           [facility]

a



     PH-7.4                                     (Same as:           l



     (Bolus) IV      10:36:                               Isolyte S           He

rm                                 PH 7.4)           

 

     Phenergan      2018      No                       12.5 mg,           [facility]

rajeev



               08                               0.5 mL,           l



               10:35:                               Route:                                            IVPB, Drug           



                                                  form: INJ,           



                                                  ONCE,           



                                                  Dosing           



                                                  Weight           



                                                  131.818,           



                                                  kg,            



                                                  Priority:           



                                                  STAT,           



                                                  Start           



                                                  date:           



                                                  18           



                                                  4:35:00           



                                                  CST, Stop           



                                                  date:           



                                                  18           



                                                  4:35:00           



                                                  CST            

 

     Citalopram Citalopram       Yes  Na [place]                1 tablet     

      Common



     Hydrobromid Hydrobromid 7-16                                              S

pirit



     e    e    00:00:                                              - [facility]



                                                               [facility]

 

     Seroquel Seroquel       Yes  Na [place]                1 tablet         

  Common



               7-16                                              [facility]



               00:00:                                              - [facility]



                                                               [facility]

 

     Docusate            Yes                      100 mg = 1           Mem

oria



     Sodium 100      5-08                               cap, PO,           l



     MG Oral      14:56:                               BID, 0           



     Capsule      00                                 Refill(s)           

 

     Zosyn            Yes                      0              [facility]



               5-08                               Refill(s)           l



               14:56:                                              [facility]



               00                                                

 

     celecoxib            Yes                      200 mg = 1           Me

moria



     200 mg oral      5-08                               cap, PO,           l



     capsule      14:56:                               BID, 0           [place]                                 Refill(s)           

 

     ascorbic            Yes                      500 mg = 1           Mem

oria



     acid      5-08                               tab, PO,           l



               14:56:                               BID, 0           [facility]



               00                                 Refill(s)           

 

     acetaminoph            Yes                      1,000 mg =           

[facility]



     en 500 mg      5-08                               2 tab, PO,           l



     oral tablet      14:56:                               Q6Hnow, 0           H

ermann



               00                                 Refill(s)           

 

     Oxycodone            Yes                      5 mg = 1           [facility]

rajeev



     Hydrochlori      5-08                               tab, PO,           l



     de 5 MG      14:56:                               Q4H, PRN           [facility]

n



     Oral Tablet      00                                 Pain Score           



                                                  4-6, 0           



                                                  Refill(s)           

 

     zinc            Yes                      220 mg = 1           [facility]



     sulfate 220      5-08                               cap, PO,           l



     mg oral      14:56:                               Daily, 0           [facility]

n



     capsule      00                                 Refill(s)           

 

     multivitami            Yes                      1 tab, PO,           

[facility]



     n         5-08                               Daily, 0           l



               14:56:                               Refill(s)           [place]



                                                               

 

     methocarbam            Yes                      1,000 mg =           

[facility]



     ol 500 mg      -08                               2 tab, PO,           l



     oral tablet      14:56:                               Q8H, 0           [facility]

jorge



               00                                 Refill(s)           

 

     LORazepam            Yes                      0.5 mg = 1           Me

moria



     0.5 mg oral      5-08                               tab, PO,           l



     tablet      14:56:                               Q8H, PRN           Jose



               00                                 Anxiety, 0           



                                                  Refill(s)           

 

     Lidocaine            Yes                      3 patch,           [facility]

rajeev



     Hydrochlori      5-08                               TOP,           l



     de 0.05      14:56:                               Daily,           [facility]



     MG/MG      00                                 Remove           



     Transdermal                                         after 12           



     Patch                                         hours, 0           



     [Lidoderm]                                         Refill(s)           

 

     Robaxin            No                       Notes:           [facility]



               5-06                               (Same           l



               21:00:                               as:Robaxin           Jose



                                                )              

 

     Oxycodone            No                       Notes:           [facility]

a



     Hydrochlori      5-06                               (Same as:           l



     de 5 MG      18:06:                               Roxicodone           [facility]

jorge



     Oral Tablet                                       )              

 

     Ativan            No                       Notes:           [facility]



               5-06                               (Same as:           l



               15:18:                               Ativan)           [place]



               00                                                

 

     Trazodone            No                       Notes:           [facility]

a



     Hydrochlori      5-06                               (Same As:           l



     de 50 MG      02:00:                               Desyrel)           [facility]

nn



     Oral Tablet      00                                                

 

     remove            No                       Notes:           [facility]



     patch      5-06                               Remove           l



               02:00:                               patch 12           [facility]



               00                                 hours           



                                                  after           



                                                  applicatio           



                                                  n each           



                                                  day.           

 

     Oxycodone            No                       Notes:           [facility]

a



     Hydrochlori      5-05                               (Same as:           l



     de 5 MG      22:01:                               Roxicodone           Herm

jorge



     Oral Tablet      00                                 )              

 

     Celebrex            No                       Notes:           [facility]



               5-05                               NSAID.           l



               22:00:                               Please           Jose



               00                                 check           



                                                  indication           



                                                  . Not for           



                                                  seizure.           



                                                  (Same As:           



                                                  CeleBREX)           

 

     Vancomycin            No                        2001 mg:           Me

moria



               5-05                               infuse           l



               21:00:                               over 2.5           [place]



               00                                 hours           

 

     Lidocaine            No                       Notes:           [facility]

a



     Hydrochlori      5-05                               Apply only           l



     de 0.05      14:00:                               once for           [facility]

n



     MG/MG      00                                 up to 12           



     Transdermal                                         hours in a           



     Patch                                         24-hour           



     [Lidoderm]                                         period (12           



                                                  hours on           



                                                  and 12           



                                                  hours           



                                                  off).           



                                                  (Same as:           



                                                  Lidoderm)           



                                                  "Remove           



                                                  old patch           



                                                  before           



                                                  applicatio           



                                                  n of new           



                                                  patch"           

 

     Phenergan            No                       Notes: Do           Mem

oria



               5-04                               not give           l



               22:40:                               IV push.           [place]



                                                (Same as:           



                                                  Phenergan)           

 

     Dilaudid            No                       Notes:           [facility]



               5-04                               Same as           l



               22:40:                               Dilaudid           [place]



               00                                                

 

     Tramadol            No                       Notes: Not           Mem

oria



               5-04                               to exceed           l



               22:00:                               400mg/day.           Jose



               00                                 (Same As:           



                                                  Ultram)           

 

     gabapentin            No                       Notes:           [facility]

ia



               -04                               (Same as:           l



               22:00:                               Neurontin)           Jose



               00                                                

 

     Acetaminoph            No                       Notes: Max           

[facility]



     en        -04                               acetaminop           l



               22:00:                               hen 4000           Jose



               00                                 mg/day (4           



                                                  gm/day).           



                                                  (Same as:           



                                                  Tylenol           



                                                  Extra           



                                                  Strength)           

 

     Robaxin            No                       Notes:           [facility]



               5-04                               (Same           l



               22:00:                               as:Robaxin           Jose



               00                                 )              

 

     Oxycodone            No                       Notes:           [facility]

a



     Hydrochlori      5-04                               (Same as:           l



     de 5 MG      21:33:                               Roxicodone           [facility]

jorge



     Oral Tablet      00                                 )              

 

     Beneprotein            No                       Notes:           [facility]

rajeev



     7 gm pkt      -04                               (Same as:           l



               21:30:                               Beneprotei           Jose



               00                                 n)             

 

     Acetaminoph            No                       1 tab, PO,           

[facility]



     en 325 MG /      5-04                               TID, 0           l



     Oxycodone      17:12:                               Refill(s)           Her

mateusz



     Hydrochlori      00                                                



     de 10 MG                                                        



     Oral Tablet                                                        



     [Percocet                                                        



     10/325]                                                        

 

     Dilaudid            No                       0.5 mg,           [facility]

a



               5-04                               0.25 mL,           l



               16:01:                               Route:           [place]



               00                                 IVP, Drug           



                                                  form: INJ,           



                                                  ONCE,           



                                                  Start           



                                                  date:           



                                                  18           



                                                  11:01:00           



                                                  CDT, Stop           



                                                  date:           



                                                  18           



                                                  11:01:00           



                                                  CDT            

 

     Phenergan            No                       Notes:           [facility]

a



               5-04                               (Same as:           l



               15:43:                               Phenergan)                                                           

 

     Dilaudid            No                       2 mg,           [facility]



               5-04                               Route:           l



               15:43:                               IVP, ONCE,                                            Dosing           



                                                  Weight           



                                                  127.027,           



                                                  kg,            



                                                  Priority:           



                                                  STAT,           



                                                  Start           



                                                  date:           



                                                  18           



                                                  10:43:00           



                                                  CDT, Stop           



                                                  date:           



                                                  18           



                                                  10:43:00           



                                                  CDT            

 

     Docusate            No                       Notes:           [facility]



               5-04                               (Same as:           l



               14:00:                               Colace)                                            (Do Not           



                                                  Crush)           

 

     Zinc            No                       Notes:           [facility]



     Sulfate      -04                               (Zinc           l



               14:00:                               sulfate                                            capsule) -           



                                                  220 mg           



                                                  Zinc           



                                                  sulfate =           



                                                  50 mg           



                                                  elemental           



                                                  zinc  Same           



                                                  as Zinc           



                                                  Sulfate           

 

     ascorbic            No                       Notes:           [facility]



     acid      5-04                               (Same as:           l



               14:00:                               Vitamin C)                                                           

 

     multivitami            No                       Notes:           [facility]

[facility]



     n         -04                               (Same           l



               14:00:                               as:Thera)                                            WASTE: F/P           



                                                  - Black; E           



                                                  -              



                                                  Municipal           



                                                  Trash Bin           



                                                  Take with           



                                                  food.           

 

     Naproxen            No                       Notes:           [facility]



               5-04                               (Same as:           l



               07:00:                               Naprosyn)                                            Take with           



                                                  food.           

 

     Zosyn            No                       Notes:           [facility]



               5-04                               (Same as:           l



               07:00:                               Zosyn)                                            Dosing           



                                                  based on           



                                                  Piperac[place]           



                                                  in             



                                                  component           



                                                   ***           



                                                  MEDICATION           



                                                  WASTE ***           



                                                  Product           



                                                  Size:           



                                                  3375 mg           



                                                  Product           



                                                  Wasted:           



                                                  ___ mg           

 

     Vancomycin            No                        2001 mg:           [facility]

[facility]



               5-04                               infuse           l



               07:00:                               over 2.5           [facility]



               00                                 hours  For           



                                                  adult           



                                                  patients           



                                                  only:           



                                                  Round to           



                                                  nearest           



                                                  250 mg per           



                                                  Medical           



                                                  Staff           



                                                  approval           



                                                  ***            



                                                  MEDICATION           



                                                  WASTE ***           



                                                  Product           



                                                  Size:           



                                                  1000 mg           



                                                  Product           



                                                  Wasted:           



                                                  ___ mg           

 

     Enoxaparin            No                       Notes:           [facility]

ia



               -04                               (Same as:           l



               07:00:                               Lovenox)           [facility]



                                                               

 

     Sodium            No                       1,000 mL,           [facility]

a



     Chloride                                     Rate: 125           l



     0.9% IV      06:46:                               ml/hr,           [facility]



     1,000 mL      00                                 Infuse           



                                                  over: 8           



                                                  hr, Route:           



                                                  IV, Dosing           



                                                  Weight           



                                                  127.27 kg,           



                                                  Total           



                                                  Volume:           



                                                  1,000,           



                                                  Start           



                                                  date:           



                                                  18           



                                                  1:46:00           



                                                  CDT,           



                                                  Duration:           



                                                  30 day,           



                                                  Stop date:           



                                                  18           



                                                  1:45:00           



                                                  CDT, 2.44,           



                                                  m2             

 

     Saline            No                       Notes:           [facility]



     Flush 0.9%                                     (Same as:           l



               06:46:                               BD             [place]



                                                Posiflush)           

 

     Acetaminoph            No                       Notes: Do           M

[facility]



     en        -04                               not exceed           l



               06:46:                               4 gm/day.           [facility]



                                                (Same as:           



                                                  Tylenol)           

 

     Acetaminoph            No                       Notes:           [facility]

[facility]



     en 325 MG /      5-                               (Same as:           l



     Hydrocodone      06:46:                               Norco           [facility]

nn



     Bitartrate      00                                 325/5)  Do           



     5 MG Oral                                         not exceed           



     Tablet                                         4gm/day of           



                                                  acetaminop           



                                                  hen.           

 

     Reglan            No                       Notes:           [facility]



               5-04                               (Same as:           l



               04:27:                               Reglan)           [place]



                                                               

 

     Benadryl            No                       Notes:           [facility]



               5-04                               (Same as:           l



               04:27:                               Benadryl)           [facility]



                                                               

 

     Magnesium            No                       Notes:           [facility]

a



     Sulfate                                     WASTE: F/P           l



               04:26:                               - Sink; E           [facility]



               00                                 -              



                                                  Municipal           



                                                  Trash Bin           

 

     Sodium            No                       1,000 mL,           [facility]

a



     Chloride      -04                               1000           l



     0.9%      04:26:                               ml/hr,           [place]



     (Bolus) IV      00                                 Infuse           



                                                  Over: 1           



                                                  hr, Route:           



                                                  IV, 1,000,           



                                                  Drug form:           



                                                  INJ, ONCE,           



                                                  Priority:           



                                                  STAT,           



                                                  Dosing           



                                                  Weight           



                                                  127.273           



                                                  kg, Start           



                                                  date:           



                                                  18           



                                                  23:26:00           



                                                  CDT, Stop           



                                                  date:           



                                                  18           



                                                  23:26:00           



                                                  CDT            

 

     Zosyn            No                       4.5 gm,           [facility]



                                              Route:           l



               04:10:                               IVPB,           [place]



                                                ONCE,           



                                                  Dosing           



                                                  Weight           



                                                  127.273,           



                                                  kg,            



                                                  Priority:           



                                                  STAT,           



                                                  Start           



                                                  date:           



                                                  18           



                                                  23:10:00           



                                                  CDT, Stop           



                                                  date:           



                                                  18           



                                                  23:10:00           



                                                  CDT, ABX           



                                                  Indication           



                                                  :              



                                                  Bacteremia           

 

     Vancomycin            No                        2001 mg:           [facility]

[facility]



                                              infuse           l



               04:10:                               over 2.5           [facility]                                 hours  For           



                                                  adult           



                                                  patients           



                                                  only:           



                                                  Round to           



                                                  nearest           



                                                  250 mg per           



                                                  Medical           



                                                  Staff           



                                                  approval           



                                                  ***            



                                                  MEDICATION           



                                                  WASTE ***           



                                                  Product           



                                                  Size:           



                                                  1000 mg           



                                                  Product           



                                                  Wasted:           



                                                  ___ mg           

 

     normal            No                       1,000 mL,           [facility]

a



     saline 0.9%                                     Rate: 75           l



     IV 1,000 mL      03:39:                               ml/hr,                                            Infuse           



                                                  over: 13.3           



                                                  hr, Route:           



                                                  IV, Dosing           



                                                  Weight           



                                                  127.273           



                                                  kg, Total           



                                                  Volume:           



                                                  1,000,           



                                                  Start           



                                                  date:           



                                                  18           



                                                  22:39:00           



                                                  CDT,           



                                                  Duration:           



                                                  30 day,           



                                                  Stop date:           



                                                  18           



                                                  22:38:00           



                                                  CDT, 2.36,           



                                                  m2             

 

     Acetaminoph            No                       Notes: Max           

[facility]



     en                                       acetaminop           l



               02:56:                               hen 4000           [place]



               00                                 mg/day (4           



                                                  gm/day).           



                                                  (Same as:           



                                                  Tylenol           



                                                  Extra           



                                                  Strength)           

 

     Rocephin            No                       Notes:           [facility]



                                              (Same As:           l



               02:21:                               Rocephin).           [facility]                                   ***           



                                                  MEDICATION           



                                                  WASTE ***           



                                                  Product           



                                                  Size:           



                                                  1000 mg           



                                                  Product           



                                                  Wasted:           



                                                  _0__ mg           

 

     Morphine            No                       4 mg,           [facility]



                                              Route:           l



               00:05:                               IVP, ONCE,           [facility]                                 Dosing           



                                                  Weight           



                                                  127.273,           



                                                  kg,            



                                                  Priority:           



                                                  STAT,           



                                                  Start           



                                                  date:           



                                                  18           



                                                  19:05:00           



                                                  CDT, Stop           



                                                  date:           



                                                  18           



                                                  19:05:00           



                                                  CDT            

 

     Benadryl            No                       Notes:           [facility]



               -                               (Same as:           l



               23:14:                               Benadryl)           [place]



                                                               

 

     Benadryl            No                       Notes:           [facility]



               5-03                               (Same as:           l



               22:56:                               Benadryl)           [place]



               00                                                

 

     Morphine      2018-0      No                       4 mg, 1           [facility]

a



               5-03                               mL, Route:           l



               22:56:                               IVP, Drug                                            form:           



                                                  SOLN,           



                                                  ONCE,           



                                                  Dosing           



                                                  Weight           



                                                  127.273,           



                                                  kg,            



                                                  Priority:           



                                                  STAT,           



                                                  Start           



                                                  date:           



                                                  18           



                                                  17:56:00           



                                                  CDT, Stop           



                                                  date:           



                                                  18           



                                                  17:56:00           



                                                  CDT            

 

     OXYCODONE      2017      Yes                      Take  by           [facility]

ers



     HCL/ACETAMI      2-20                               mouth.           ity of



     NOPHEN      10:03:                                              [place]



     (PERCOCET      17                                                Medical



     ORAL)                                                        [place]

 

     OXYCODONE      2017      Yes                      Take  by           Univ

ers



     HCL/ACETAMI      2-20                               mouth.           ity of



     NOPHEN      04:03:                                              [place]



     (PERCOCET      17                                                Medical



     ORAL)                                                        [place]

 

     OXYCODONE      2017      Yes                      Take  by           Univ

ers



     HCL/ACETAMI      2-20                               mouth.           ity of



     NOPHEN      04:03:                                              [place]



     (PERCOCET      17                                                Medical



     ORAL)                                                        [place]

 

     OXYCODONE      2017      Yes                      Take  by           Univ

ers



     HCL/ACETAMI      2-20                               mouth.           ity of



     NOPHEN      04:03:                                              [place]



     (PERCOCET      17                                                Medical



     ORAL)                                                        [place]

 

     OXYCODONE      2017      Yes                      Take  by           Univ

ers



     HCL/ACETAMI      2-20                               mouth.           ity of



     NOPHEN      04:03:                                              [place]



     (PERCOCET      17                                                Medical



     ORAL)                                                        [place]

 

     dicyclomine      2017      Yes            20mg      Take 1           Univ

ers



     (BENTYL) 20      2-20                               tablet by           ity

 of



     mg tablet      00:00:                               mouth 4           [place]



               00                                 (four)           Medical



                                                  times           Branch



                                                  daily.           

 

     proMETHazin      2017      Yes            25mg      Take 1           Univ

ers



     e 25 mg      2-20                               tablet by           ity of



     tablet      00:00:                               mouth           [place]



               00                                 every 6           Medical



                                                  (six)           Branch



                                                  hours as           



                                                  needed for           



                                                  Nausea and           



                                                  Vomiting           



                                                  (N/V).           

 

     proMETHazin      2017      Yes            25mg      Take 1           Univ

ers



     e 25 mg      2-20                               tablet by           ity of



     tablet      00:00:                               mouth           [place]



               00                                 every 6           Medical



                                                  (six)           Branch



                                                  hours as           



                                                  needed for           



                                                  Nausea and           



                                                  Vomiting           



                                                  (N/V).           

 

     proMETHazin      2017      Yes            25mg      Take 1           Univ

ers



     e 25 mg      2-20                               tablet by           ity of



     tablet      00:00:                               mouth           [place]



               00                                 every 6           Medical



                                                  (six)           Branch



                                                  hours as           



                                                  needed for           



                                                  Nausea and           



                                                  Vomiting           



                                                  (N/V).           

 

     dicyclomine      2017-1      Yes            20mg      Take 1           Univ

ers



     (BENTYL) 20      2-20                               tablet by           ity

 of



     mg tablet      00:00:                               mouth 4           [place]



               00                                 (four)           Medical



                                                  times           Branch



                                                  daily.           

 

     proMETHazin      -      Yes            25mg      Take 1           Univ

ers



     e 25 mg      2-20                               tablet by           ity of



     tablet      00:00:                               mouth           [place]



               00                                 every 6           Medical



                                                  (six)           Branch



                                                  hours as           



                                                  needed for           



                                                  Nausea and           



                                                  Vomiting           



                                                  (N/V).           

 

     dicyclomine      2017      Yes            20mg      Take 1           Univ

ers



     (BENTYL) 20      2-20                               tablet by           ity

 of



     mg tablet      00:00:                               mouth 4           [place]



               00                                 (four)           Medical



                                                  times           Branch



                                                  daily.           

 

     proMETHazin      2017      Yes            25mg      Take 1           Univ

ers



     e 25 mg      2-20                               tablet by           ity of



     tablet      00:00:                               mouth           [place]



               00                                 every 6           Medical



                                                  (six)           Branch



                                                  hours as           



                                                  needed for           



                                                  Nausea and           



                                                  Vomiting           



                                                  (N/V).           

 

     proMETHazin      2017- No             25mg      Take 1           Uni

vers



     e 25 mg      2-20 01-25                          tablet by           ity of



     tablet      00:00: 00:00                          mouth           [place]



               00   :00                           every 6           Medical



                                                  (six)           Branch



                                                  hours as           



                                                  needed for           



                                                  Nausea and           



                                                  Vomiting           



                                                  (N/V).           

 

     dicyclomine      2017- No             20mg      Take 1           Uni

vers



     (BENTYL) 20      2-20 01-15                          tablet by           it

y of



     mg tablet      00:00: 00:00                          mouth 4           [place]

[facility]



               00   :00                           (four)           Medical



                                                  times           [facility]



                                                  daily.           

 

     proMETHazin      -0      Yes            25mg      Take 1           Univ

ers



     e 25 mg      1-04                               tablet by           ity of



     tablet      00:00:                               mouth           [place]



               00                                 every 6           Medical



                                                  (six)           Branch



                                                  hours as           



                                                  needed for           



                                                  Nausea and           



                                                  Vomiting           



                                                  (N/V) for           



                                                  up to 12           



                                                  doses.           

 

     proMETHazin      2017-0      Yes            25mg      Take 1           Univ

ers



     e 25 mg      1-04                               tablet by           ity of



     tablet      00:00:                               mouth           [place]



               00                                 every 6           Medical



                                                  (six)           Branch



                                                  hours as           



                                                  needed for           



                                                  Nausea and           



                                                  Vomiting           



                                                  (N/V) for           



                                                  up to 12           



                                                  doses.           

 

     proMETHazin      2017-0      Yes            25mg      Take 1           Univ

ers



     e 25 mg      1-04                               tablet by           ity of



     tablet      00:00:                               mouth           [place]



               00                                 every 6           Medical



                                                  (six)           Branch



                                                  hours as           



                                                  needed for           



                                                  Nausea and           



                                                  Vomiting           



                                                  (N/V) for           



                                                  up to 12           



                                                  doses.           

 

     proMETHazin      2017-0      Yes            25mg      Take 1           Univ

ers



     e 25 mg      1-04                               tablet by           ity of



     tablet      00:00:                               mouth           [place]



               00                                 every 6           Medical



                                                  (six)           Branch



                                                  hours as           



                                                  needed for           



                                                  Nausea and           



                                                  Vomiting           



                                                  (N/V) for           



                                                  up to 12           



                                                  doses.           

 

     proMETHazin      2017-0      Yes            25mg      Take 1           Univ

ers



     e 25 mg      1-04                               tablet by           ity of



     tablet      00:00:                               mouth           [place]



               00                                 every 6           Medical



                                                  (six)           Branch



                                                  hours as           



                                                  needed for           



                                                  Nausea and           



                                                  Vomiting           



                                                  (N/V) for           



                                                  up to 12           



                                                  doses.           

 

     proMETHazin      2017-0      Yes            25mg      Take 1           Univ

ers



     e 25 mg      1-04                               tablet by           ity of



     tablet      00:00:                               mouth           [place]



               00                                 every 6           Medical



                                                  (six)           Branch



                                                  hours as           



                                                  needed for           



                                                  Nausea and           



                                                  Vomiting           



                                                  (N/V) for           



                                                  up to 12           



                                                  doses.           

 

     proMETHazin      2017-0      Yes            25mg      Take 1           Univ

ers



     e 25 mg      1-04                               tablet by           ity of



     tablet      00:00:                               mouth           [place]



               00                                 every 6           Medical



                                                  (six)           Branch



                                                  hours as           



                                                  needed for           



                                                  Nausea and           



                                                  Vomiting           



                                                  (N/V) for           



                                                  up to 12           



                                                  doses.           

 

     proMETHazin      2017-0      Yes            25mg      Take 1           Univ

ers



     e 25 mg      1-04                               tablet by           ity of



     tablet      00:00:                               mouth           [place]



               00                                 every 6           Medical



                                                  (six)           Branch



                                                  hours as           



                                                  needed for           



                                                  Nausea and           



                                                  Vomiting           



                                                  (N/V) for           



                                                  up to 12           



                                                  doses.           

 

     proMETHazin      2017-0      Yes            25mg      Take 1           Univ

ers



     e 25 mg      1-04                               tablet by           ity of



     tablet      00:00:                               mouth           [place]



               00                                 every 6           Medical



                                                  (six)           Branch



                                                  hours as           



                                                  needed for           



                                                  Nausea and           



                                                  Vomiting           



                                                  (N/V) for           



                                                  up to 12           



                                                  doses.           

 

     proMETHazin      2017-0  No             25mg      Take 1           Uni

vers



     e 25 mg      1-04 05-11                          tablet by           ity of



     tablet      00:00: 00:00                          mouth           [place]



               00   :00                           every 6           Medical



                                                  (six)           Branch



                                                  hours as           



                                                  needed for           



                                                  Nausea and           



                                                  Vomiting           



                                                  (N/V) for           



                                                  up to 12           



                                                  doses.           

 

     Tylenol Tylenol           Yes  [facility] [place]                1 tablet           [facility]

[facility]



     with with                                    as needed           [facility]



     Codeine #4 Codeine #4                                                   - C

HI



                                                                 [facility]

 

     Macrobid Macrobid           Yes  Na [place]                1 capsule          

 Common



                                                  with food           [facility]

 

     Pantoprazol Pantoprazol           Yes  [facility] [place]                1 tablet     

      Common



     e Sodium e Sodium                                                   [facility]

 

     Collagenase Collagenase           Yes  [facility] [place]                1            

  Common



                                                  applicatio           [facility]



                                                  n to           - [facility]



                                                  affected           [facility]







Vital Signs







             Vital Name   Observation Time Observation Value Comments     Source

 

             Systolic blood 2022-06-10 17:03:00 132 mm[Hg]                [facility]

sity of



             pressure                                            [place] [facility]



                                                                 [facility]

 

             Diastolic blood 2022-06-10 17:03:00 90 mm[Hg]                 [facility]

rsity of



             pressure                                            [place] [facility]



                                                                 [facility]

 

             Heart rate   2022-06-10 17:03:00 78 /min                   [facility]

ty of



                                                                 [place] [facility]



                                                                 [place]

 

             Body temperature 2022-06-10 17:03:00 35.83 Tiffanie                 Univ

ersity of



                                                                 [place] [facility]



                                                                 [facility]

 

             Respiratory rate 2022-06-10 17:03:00 14 /min                   Univ

ersity of



                                                                 [place] [facility]



                                                                 [facility]

 

             Oxygen saturation in 2022-06-10 17:03:00 93 /min                   

[facility]



             Arterial blood by                                        [place] [facility]

[facility]



             Pulse oximetry                                        [facility]

 

             Body weight  2022-06-10 09:00:00 140.933 kg                [facility]

ty of



                                                                 [place] [facility]



                                                                 [facility]

 

             BMI          2022-06-10 09:00:00 62.75 kg/m2               [facility]

ty of



                                                                 [place] [facility]



                                                                 [place]

 

             Body height  2022 12:47:00 149.9 cm                  [facility]

ty of



                                                                 [place] [facility]



                                                                 [facility]

 

             Systolic blood 2022 20:40:00 125 mm[Hg]                [facility]

sity of



             pressure                                            [place] [facility]



                                                                 [facility]

 

             Diastolic blood 2022 20:40:00 75 mm[Hg]                 Unive

rsity of



             pressure                                            [place] [facility]



                                                                 [facility]

 

             Heart rate   2022 20:40:00 99 /min                   [facility]

ty of



                                                                 [place] [facility]



                                                                 [facility]

 

             Body temperature 2022 20:40:00 36.67 Tiffanie                 Univ

ersity of



                                                                 [place] [facility]



                                                                 [facility]

 

             Respiratory rate 2022 20:40:00 20 /min                   Univ

ersity of



                                                                 [place] [facility]



                                                                 [facility]

 

             Oxygen saturation in 2022 20:40:00 93 /min                   

[facility]



             Arterial blood by                                        [place] [facility]

[facility]



             Pulse oximetry                                        [facility]

 

             Body weight  2022 22:13:00 137.485 kg                [facility]

ty of



                                                                 [place] [facility]



                                                                 [facility]

 

             BMI          2022 22:13:00 61.22 kg/m2               [facility]

ty of



                                                                 [place] [facility]



                                                                 [facility]

 

             Body height  2022 09:10:00 149.9 cm                  [facility]

ty of



                                                                 [place] [facility]



                                                                 [facility]

 

             Systolic blood 2022 15:49:00 111 mm[Hg]                [facility]

sity of



             pressure                                            [place] [facility]



                                                                 [facility]

 

             Diastolic blood 2022 15:49:00 76 mm[Hg]                 Unive

rsity of



             pressure                                            [facility]



                                                                 [facility]

 

             Heart rate   2022 15:49:00 109 /min                  [facility]

ty of



                                                                 [place] [facility]



                                                                 [facility]

 

             Body temperature 2022 15:49:00 36.06 Tiffanie                 Univ

ersity of



                                                                 [place] [facility]



                                                                 [facility]

 

             Respiratory rate 2022 15:49:00 18 /min                   Univ

ersity of



                                                                 [place] [facility]



                                                                 [facility]

 

             Oxygen saturation in 2022 15:49:00 92 /min                   

[facility]



             Arterial blood by                                        [place] [facility]

[facility]



             Pulse oximetry                                        Branch

 

             Body weight  2022 10:47:00 139.481 kg                [facility]

ty of



                                                                 [place] [facility]



                                                                 Branch

 

             BMI          2022 10:47:00 62.11 kg/m2               [facility]

[facility]



                                                                 [facility]



                                                                 [facility]anch

 

             Body height  2022 06:57:00 149.9 cm                  [facility]

[facility] of



                                                                 [facility]



                                                                 Branch

 

             Systolic blood 2022 06:00:00 144 mm[Hg]                Univer

sity of



             pressure                                            [facility]



                                                                 Branch

 

             Diastolic blood 2022 06:00:00 93 mm[Hg]                 Unive

rsity of



             pressure                                            [facility]



                                                                 Branch

 

             Heart rate   2022 06:00:00 92 /min                   [facility]

[facility] of



                                                                 [facility]



                                                                 Branch

 

             Respiratory rate 2022 06:00:00 22 /min                   [facility]iv

ersity of



                                                                 [facility]



                                                                 Branch

 

             Oxygen saturation in 2022 04:00:00 94 /min                   

[facility]



             Arterial blood by                                        [facility]

[facility]



             Pulse oximetry                                        Branch

 

             Body temperature 2022 03:45:00 37.06 Tiffanie                 [facility]iv

ersity of



                                                                 [facility]



                                                                 Branch

 

             Body height  2022 03:45:00 149.9 cm                  [facility]

[facility] of



                                                                 [facility]



                                                                 Branch

 

             Body weight  2022 03:45:00 127.461 kg                [facility]

[facility] of



                                                                 [facility]



                                                                 Branch

 

             BMI          2022 03:45:00 56.76 kg/m2               [facility]

[facility] of



                                                                 [facility]



                                                                 Branch

 

             Systolic blood 2022 03:18:00 113 mm[Hg]                Univer

sity of



             pressure                                            [facility]



                                                                 Branch

 

             Diastolic blood 2022 03:18:00 85 mm[Hg]                 Unive

rsity of



             pressure                                            [facility]



                                                                 Branch

 

             Heart rate   2022 03:18:00 96 /min                   [facility]

[facility] of



                                                                 [facility]



                                                                 [facility]anch

 

             Respiratory rate 2022 03:18:00 18 /min                   [facility]iv

ersity of



                                                                 [facility]



                                                                 Branch

 

             Oxygen saturation in 2022 03:18:00 93 /min                   

[facility]



             Arterial blood by                                        [facility]

[facility]



             Pulse oximetry                                        Branch

 

             Body temperature 2022 01:01:16 36.89 Tiffanie                 [facility]iv

ersity of



                                                                 [facility]



                                                                 Branch

 

             Body weight  2022 23:13:00 127.461 kg                [facility]

[facility] of



                                                                 [facility]



                                                                 [facility]anch

 

             BMI          2022 23:13:00 56.76 kg/m2               [facility]

[facility] of



                                                                 [facility]



                                                                 [facility]anch

 

             Systolic blood 2022 21:53:00 142 mm[Hg]                Univer

sity of



             pressure                                            [facility]



                                                                 Branch

 

             Diastolic blood 2022 21:53:00 88 mm[Hg]                 [facility]ive

rsity of



             pressure                                            [facility]



                                                                 Branch

 

             Heart rate   2022 21:53:00 96 /min                   [facility]

[facility] of



                                                                 [facility]



                                                                 [facility]anch

 

             Body temperature 2022 21:53:00 36.06 Tiffanie                 Univ

ersity of



                                                                 [facility]



                                                                 Branch

 

             Respiratory rate 2022 21:53:00 18 /min                   [facility]iv

ersity of



                                                                 [facility]



                                                                 Branch

 

             Oxygen saturation in 2022 21:53:00 96 /min                   

[facility]



             Arterial blood by                                        [facility]

[facility]



             Pulse oximetry                                        Branch

 

             Body weight  2022 09:48:00 138.801 kg                [facility]

[facility] of



                                                                 [facility]



                                                                 [facility]anch

 

             BMI          2022 09:48:00 61.80 kg/m2               [facility]

[facility] of



                                                                 [facility]



                                                                 Branch

 

             Body height  2022 10:27:00 149.9 cm                  [facility]

[facility] of



                                                                 [facility]



                                                                 [facility]anch

 

             Systolic blood 2022 03:50:00 142 mm[Hg]                Univer

sity of



             pressure                                            [facility]



                                                                 Branch

 

             Diastolic blood 2022 03:50:00 95 mm[Hg]                 Unive

rsity of



             pressure                                            [facility]



                                                                 Branch

 

             Heart rate   2022 03:50:00 93 /min                   [facility]

[facility] of



                                                                 [facility]



                                                                 [facility]anch

 

             Respiratory rate 2022 03:50:00 17 /min                   [facility]

ersity of



                                                                 [facility]



                                                                 Branch

 

             Oxygen saturation in 2022 03:50:00 98 /min                   

[facility]



             Arterial blood by                                        [facility]

[facility]



             Pulse oximetry                                        Branch

 

             Body temperature 2022 20:39:00 36.5 Tiffanie                  Univ

ersity of



                                                                 [facility]



                                                                 [facility]anch

 

             Body weight  2022 20:39:00 136.079 kg                [facility]

[facility] of



                                                                 [facility]



                                                                 [facility]anch

 

             BMI          2022 20:39:00 60.59 kg/m2               [facility]

[facility] of



                                                                 [facility]



                                                                 [facility]anch

 

             Systolic blood 2022 01:46:00 134 mm[Hg]                [facility]

sity of



             pressure                                            [facility]



                                                                 [facility]anch

 

             Diastolic blood 2022 01:46:00 100 mm[Hg]                [facility]ive

rsity of



             pressure                                            [facility]



                                                                 [facility]anch

 

             Heart rate   2022 01:46:00 102 /min                  [facility]

[facility] of



                                                                 [facility]



                                                                 [facility]

 

             Respiratory rate 2022 01:46:00 22 /min                   [facility]iv

ersity of



                                                                 [facility]



                                                                 [facility]anch

 

             Oxygen saturation in 2022 01:46:00 96 /min                   

[facility]



             Arterial blood by                                        [facility]

[facility]



             Pulse oximetry                                        Branch

 

             Body temperature 2022-01-15 20:52:00 36.67 Tiffanie                 Univ

ersity of



                                                                 [facility]



                                                                 Branch

 

             Body weight  2022-01-15 20:52:00 136.079 kg                [facility]

[facility] of



                                                                 [facility]



                                                                 [facility]anch

 

             BMI          2022-01-15 20:52:00 60.59 kg/m2               [facility]

ty of



                                                                 [facility]



                                                                 [facility]anch

 

             Systolic blood 2021 23:30:00 175 mm[Hg]                [facility]iver

sity of



             pressure                                            [facility]



                                                                 Branch

 

             Diastolic blood 2021 23:30:00 107 mm[Hg]                [facility]ive

rsity of



             pressure                                            [facility]



                                                                 [facility]anch

 

             Heart rate   2021 23:30:00 81 /min                   [facility]

[facility] of



                                                                 [facility]



                                                                 [facility]anch

 

             Respiratory rate 2021 23:30:00 21 /min                   [facility]

ersity of



                                                                 [facility]



                                                                 [facility]anch

 

             Oxygen saturation in 2021 23:30:00 97 /min                   

[facility]



             Arterial blood by                                        [facility]

[facility]



             Pulse oximetry                                        Branch

 

             Body weight  2021 21:55:00 136.079 kg                [facility]

[facility] of



                                                                 [facility]



                                                                 [facility]anch

 

             BMI          2021 21:55:00 60.59 kg/m2               [facility]

ty of



                                                                 [facility]



                                                                 [facility]anch

 

             Body temperature 2021 21:55:00 37.44 Tiffanie                 Univ

ersity of



                                                                 [facility]



                                                                 Branch

 

             Systolic blood 2021-05-10 01:30:00 163 mm[Hg]                Univer

sity of



             pressure                                            [facility]



                                                                 Branch

 

             Diastolic blood 2021-05-10 01:30:00 106 mm[Hg]                Unive

rsity of



             pressure                                            [facility]



                                                                 Branch

 

             Heart rate   2021-05-10 01:30:00 97 /min                   [facility]

[facility] of



                                                                 [facility]



                                                                 [facility]

 

             Respiratory rate 2021-05-10 01:30:00 21 /min                   [facility]iv

ersity of



                                                                 [facility]



                                                                 [facility]anch

 

             Oxygen saturation in 2021-05-10 01:30:00 97 /min                   

[facility]



             Arterial blood by                                        [facility]

[facility]



             Pulse oximetry                                        Branch

 

             Body temperature 2021 23:27:00 36.83 Tiffanie                 [facility]iv

ersity of



                                                                 [facility]



                                                                 Branch

 

             Body height  2021 23:27:00 149.9 cm                  [facility]

[facility] of



                                                                 [facility]



                                                                 [facility]anch

 

             Body weight  2021 23:27:00 136.079 kg                [facility]

[facility] of



                                                                 [facility]



                                                                 [facility]anch

 

             BMI          2021 23:27:00 60.59 kg/m2               Universi

ty of



                                                                 [facility]



                                                                 Branch

 

             Systolic blood 2021-05-10 01:30:00 163 mm[Hg]                Univer

sity of



             pressure                                            [facility]



                                                                 Branch

 

             Diastolic blood 2021-05-10 01:30:00 106 mm[Hg]                Unive

rsity of



             pressure                                            [facility]



                                                                 Branch

 

             Heart rate   2021-05-10 01:30:00 97 /min                   [facility]

[facility] of



                                                                 [facility]



                                                                 [facility]anch

 

             Respiratory rate 2021-05-10 01:30:00 21 /min                   [facility]iv

ersity of



                                                                 [facility]



                                                                 Branch

 

             Oxygen saturation in 2021-05-10 01:30:00 97 /min                   

[facility]



             Arterial blood by                                        [facility]

[facility]



             Pulse oximetry                                        Branch

 

             Body temperature 2021 23:27:00 36.83 Tiffanie                 Univ

ersity of



                                                                 [facility]



                                                                 Branch

 

             Body height  2021 23:27:00 149.9 cm                  [facility]

[facility] of



                                                                 [facility]



                                                                 [facility]anch

 

             Body weight  2021 23:27:00 136.079 kg                [facility]

[facility] of



                                                                 [facility]



                                                                 [facility]anch

 

             BMI          2021 23:27:00 60.59 kg/m2               [facility]

ty of



                                                                 [facility]



                                                                 Branch

 

             Respitory Rate 2018 17:30:00                           Siri morales [place]

 

             Systolic (mm Hg) 2018 17:30:00                           [facility]

 

             Diastolic (mm Hg) 2018 17:30:00                           [facility]

 

             Temperature Oral (F) 2018 17:30:00 98.4 F                    

[facility]

 

             Temperature Oral (F) 2018 16:23:00 98.6 F                    

[facility]

 

             Systolic (mm Hg) 2018 16:23:00                           [facility]

[facility]

 

             Diastolic (mm Hg) 2018 16:23:00                           [facility]

[facility] [place]

 

             Respitory Rate 2018 14:00:00                           [facility]

[facility]

 

             Systolic (mm Hg) 2018 14:00:00                           [facility]

rial [place]

 

             Diastolic (mm Hg) 2018 14:00:00                           [facility]

[facility]

 

             Respitory Rate 2018 13:30:00                           [facility]

al [place]

 

             BMI Calculated 2018 10:16:00                           [facility]

[facility] [place]

 

             Height       2018 10:16:00 149.86 cm                 [facility]

 [place]

 

             Weight       2018 10:16:00                           [facility]

 [facility]

 

             Heart Rate   2018 10:16:00                           [facility]

 

             Temperature Oral (F) 2018 10:16:00 97.9 F                    

[facility]

 

             Temperature Oral (F) 2018 13:40:00 97.2 F                    

[facility] [place]

 

             Systolic (mm Hg) 2018 13:40:00                           [facility]

[facility]

 

             Diastolic (mm Hg) 2018 13:40:00                           [facility]

[facility]

 

             Heart Rate   2018 13:40:00                           [facility]

 [facility]

 

             Respitory Rate 2018 13:40:00                           [facility]

al [place]

 

             Heart Rate   2018 05:24:00                           [facility]

 [facility]

 

             Systolic (mm Hg) 2018 05:24:00                           Chace

rial [place]

 

             Diastolic (mm Hg) 2018 05:24:00                           [facility]

[facility] [place]

 

             Respitory Rate 2018 05:24:00                           [facility]

al [facility]

 

             Temperature Oral (F) 2018 05:24:00 98.1 F                    

[facility]

 

             Respitory Rate 2018 01:15:00                           [facility]

al [facility]

 

             Systolic (mm Hg) 2018 01:15:00                           [facility]

[facility]

 

             Diastolic (mm Hg) 2018 01:15:00                           [facility]

[facility]

 

             Heart Rate   2018 01:15:00                           [facility]

 [place]

 

             Temperature Oral (F) 2018 01:15:00 98.1 F                    

[facility]

 

             Weight       2018 14:00:00                           [facility]

 [facility]

 

             Weight       2018 14:00:00                           [facility]

 [facility]

 

             Weight       2018 14:00:00                           [facility]

 [place]

 

             BMI Calculated 2018 05:43:00                           Siri Ernst

 

             Height       2018 05:43:00 162.56 cm                 [facility]

 [facility]

 

             Height       2018 22:41:00 149.86 cm                 [facility]

 [facility]

 

             BMI Calculated 2018 22:41:00                           Siri Ernst







Procedures







                Procedure       Date / Time     Performing Clinician Source



                                Performed                       

 

                POCT GLUCOSE (AUTOMATED) 2022-06-10 17:03:00 Teagan Ibanez  [facility]i

[facility]

 

                POCT GLUCOSE (AUTOMATED) 2022-06-10 12:44:00 Teagan Ibanez  [facility]

[facility]

 

                MAGNESIUM       2022-06-10 10:29:00 Sukhdev Carrera   [facility]

 

                BASIC METABOLIC PANEL (NA, 2022-06-10 10:29:00 Sukhdev Carrera   [facility]



                K, CL, CO2, GLUCOSE, BUN,                                 Medica

l Branch



                CREATININE, CA)                                 

 

                CBC WITH DIFF   2022-06-10 10:29:00 Sukhdev Carrera [facility]

 

                POCT GLUCOSE (AUTOMATED) 2022-06-10 10:28:00 EdTeagan black  [facility]

[facility]

 

                POCT GLUCOSE (AUTOMATED) 2022-06-10 05:33:00 Edsofia Mercy  Uni

versity of [facility]

 

                POCT GLUCOSE (AUTOMATED) 2022-06-10 04:37:00 EdSwapna blacky  Uni

versity of [facility]

 

                POCT GLUCOSE (AUTOMATED) 2022-06-10 02:29:00 Edsofia Mercy  [facility]

versity of [facility]

 

                POCT GLUCOSE (AUTOMATED) 2022-06-10 00:52:00 Edsofia Mercy  Uni

versity of [facility]

 

                POCT GLUCOSE (AUTOMATED) 2022 21:52:00 Edsofia Mercy  [facility]i

versity of [facility]

 

                POCT GLUCOSE (AUTOMATED) 2022 16:42:00 Teagan Ibanez  Uni

versity [facility]

 

                XR ABDOMEN 2 VW 2022 14:05:00 Sukhdev Carrera   [facility]

 

                XR CHEST 1 VW   2022 14:05:00 Deandre [facility]

 

                XR SKULL <4 VW  2022 14:05:00 Deandre [facility]

 

                POCT GLUCOSE (AUTOMATED) 2022 12:47:00 Teagan Ibanez  [facility]

 

                POCT GLUCOSE (AUTOMATED) 2022 08:59:00 Teagan Ibanez  [facility]

 

                PHOSPHORUS      2022 08:56:00 Refugio Moran [facility]

 

                MAGNESIUM       2022 08:56:00 [facility]

 

                BASIC METABOLIC PANEL (NA, 2022 08:56:00 Refugio Moran [facility]



                K, CL, CO2, GLUCOSE, BUN,                                 Medica

l Branch



                CREATININE, CA)                                 

 

                CBC WITH DIFF   2022 08:56:00 [facility]

 

                GLYCOSYLATED HEMOGLOBIN 2022 08:56:00 Refugio Moran [facility]



                (A1C)                                           [facility]

 

                POCT GLUCOSE (AUTOMATED) 2022 04:27:00 Teagan Ibanez  [facility]

 

                POCT GLUCOSE (AUTOMATED) 2022 01:11:00 Leo Mercy  [facility]

 

                POCT GLUCOSE (AUTOMATED) 2022 21:02:00 [facility] [facility]

 

                POCT GLUCOSE (AUTOMATED) 2022 16:08:00 [facility]  [employer]

[facility]

 

                POCT GLUCOSE (AUTOMATED) 2022 12:39:00 [facility]  [facility]

 

                BASIC METABOLIC PANEL (NA, 2022 09:25:00 Teagan Ibanez  [facility]



                K, CL, CO2, GLUCOSE, BUN,                                 Medica

l Branch



                CREATININE, CA)                                 

 

                CBC WITH DIFF   2022 09:25:00 Leo [facility]

 

                LACTIC ACID WHOLE BLOOD 2022 09:25:00 Vincent, [facility]

 

                POCT GLUCOSE (AUTOMATED) 2022 08:56:00 [facility]

 

                POCT GLUCOSE (AUTOMATED) 2022 04:51:00 [facility]

 

                URINALYSIS      2022 02:47:00 Octavio [facility]

 

                POCT GLUCOSE (AUTOMATED) 2022 02:29:00 Octavio [facility]

 

                HB ECG ROUTINE & RHYTHM 2022 00:33:38 Octavio [facility]

 

                URINALYSIS      2022 00:28:00 Octavio [facility]

 

                URINE CULTURE   2022 00:28:00 Octavio [facility]

 

                BLOOD CULTURE SCREEN 2022 00:25:00 Octavio [facility]

 

                LIPASE          2022 00:25:00 Octavio [facility]

 

                COMP. METABOLIC PANEL 2022 00:25:00 Octavio [facility]



                ([id number])                                         [facility]

 

                ACUTE CARE VENOUS BLOOD 2022 00:25:00 Octavio [facility]

 

                CBC WITH DIFF   2022 00:25:00 Octavio [facility]

 

                LACTIC ACID WHOLE BLOOD 2022 00:25:00 Octavio [facility]

 

                MAGNESIUM       2022 17:57:00 Alan [facility]

 

                BASIC METABOLIC PANEL (NA, 2022 17:57:00 Simran Phillips    [facility]



                K, CL, CO2, GLUCOSE, BUN,                                 Medica

l Branch



                CREATININE, CA)                                 

 

                CBC WITH DIFF   2022 17:57:00 Alan [facility]

 

                POCT GLUCOSE (AUTOMATED) 2022 16:49:00 TerminEfrain campbell

[facility]

 

                POCT GLUCOSE (AUTOMATED) 2022 12:56:00 Terminella, Efrain U

niversity of 

[facility]

 

                POCT GLUCOSE (AUTOMATED) 2022 09:41:00 Terminella, Efrain U

niversity of 

[facility]

 

                POCT GLUCOSE (AUTOMATED) 2022 05:08:00 Terminella, Efrain U

niversity of 

[facility]

 

                POCT GLUCOSE (AUTOMATED) 2022 01:26:00 Terminella, Efrain U

niversity of 

[facility]

 

                POCT GLUCOSE (AUTOMATED) 2022 21:41:00 Terminella, Efrain U

niversity of 

[facility]

 

                POCT GLUCOSE (AUTOMATED) 2022 17:07:00 Terminella, Efrain U

niversity of 

[facility]

 

                POCT GLUCOSE (AUTOMATED) 2022 14:01:00 Terminella, Efrain U

niversity of 

[facility]

 

                POCT GLUCOSE (AUTOMATED) 2022 09:18:00 Terminella, Efrain U

niversity of 

[facility]

 

                POCT GLUCOSE (AUTOMATED) 2022 01:43:00 Terminella, Efrain U

niversity of 

[facility]

 

                POCT GLUCOSE (AUTOMATED) 2022 21:24:00 Albustami, James Uni

versity of [facility]

 

                POCT GLUCOSE (AUTOMATED) 2022 16:25:00 Albdamianami, James Uni

versity [facility]

 

                URINALYSIS      2022 15:46:00 Phillips, [facility]

 

                URINE CULTURE   2022 15:46:00 Phillips, [facility]

 

                POCT GLUCOSE (AUTOMATED) 2022 15:36:00 AlbdamianamiJames Uni

versity of [facility]

 

                POCT GLUCOSE (AUTOMATED) 2022 14:25:00 Albdamianami, James Uni

versity of [facility]

 

                BASIC METABOLIC PANEL (NA, 2022 13:45:00 AlbJames gupta U

niversity [facility]



                K, CL, CO2, GLUCOSE, BUN,                                 Medica

l Branch



                CREATININE, CA)                                 

 

                POCT GLUCOSE (AUTOMATED) 2022 13:34:00 Albustami, James Uni

versity [facility]

 

                CRITICAL CARE   2022 13:05:05 Mark Fofana o

f [facility]

 

                POCT GLUCOSE (AUTOMATED) 2022 12:23:00 Albustami, James Uni

versity of [facility]

 

                POCT GLUCOSE (AUTOMATED) 2022 11:24:00 Albustami, James Uni

versity of [facility]

 

                POCT GLUCOSE (AUTOMATED) 2022 10:33:00 Albustami, James Uni

versity of [facility]

 

                BASIC METABOLIC PANEL (NA, 2022 09:32:00 Albustami, James [facility]

[facility] 

[place]



                K, CL, CO2, GLUCOSE, BUN,                                 Medica

l Branch



                CREATININE, CA)                                 

 

                POCT GLUCOSE (AUTOMATED) 2022 09:30:00 Albustami, James Uni

versity [facility]

 

                POCT GLUCOSE (AUTOMATED) 2022 08:27:00 Albustami, James Uni

versity [facility]

 

                POCT GLUCOSE (AUTOMATED) 2022 07:23:00 Albustami, James Uni

versity of [facility]

 

                POCT GLUCOSE (AUTOMATED) 2022 06:22:00 Albustami, James Uni

versity [facility]

 

                BASIC METABOLIC PANEL (NA, 2022 05:30:00 Albustami, James [facility]

[facility]



                K, CL, CO2, GLUCOSE, BUN,                                 Medica

l Branch



                CREATININE, CA)                                 

 

                POCT GLUCOSE (AUTOMATED) 2022 05:27:00 Albustami, James Uni

versity [facility]

 

                POCT GLUCOSE (AUTOMATED) 2022 04:23:00 Albustami, James Uni

versity [facility]

 

                POCT GLUCOSE (AUTOMATED) 2022 03:19:00 Albustami, James Uni

versity [facility]

 

                POCT GLUCOSE (AUTOMATED) 2022 02:24:00 Albcandy, James Uni

versity [facility]

 

                KETONES URINE   2022 01:49:00 David [facility] o

f [facility]

 

                BETA HYDROXY-BUTYRATE 2022 01:44:00 Simran Phillips [facility]

 

                BASIC METABOLIC PANEL (NA, 2022 01:44:00 Albustami, James U

[facility]



                K, CL, CO2, GLUCOSE, BUN,                                 Medica

l Branch



                CREATININE, CA)                                 

 

                POCT GLUCOSE (AUTOMATED) 2022 01:24:00 AlbJames gupta

[facility]

 

                POCT GLUCOSE (AUTOMATED) 2022 00:18:00 AlbJames [address] [facility]

 

                POCT GLUCOSE (AUTOMATED) 2022 22:56:00 AlbJames gupta [facility]

 

                BASIC METABOLIC PANEL (NA, 2022 22:03:00 AlbJames gupta

[facility]



                K, CL, CO2, GLUCOSE, BUN,                                 Medica

l Branch



                CREATININE, CA)                                 

 

                POCT GLUCOSE (AUTOMATED) 2022 21:50:00 [place] [facility]

 

                POCT GLUCOSE (AUTOMATED) 2022 20:39:[address] Albc[address] James [facility]

 

                POCT GLUCOSE (AUTOMATED) 2022 18:58:00 David James [facility]

 

                BASIC METABOLIC PANEL (NA, 2022 17:46:00 AlbJames gupta

[facility]



                K, CL, CO2, GLUCOSE, BUN,                                 Medica

l Branch



                CREATININE, CA)                                 

 

                POCT GLUCOSE (AUTOMATED) 2022 17:39:00 [place] [facility]

 

                POCT GLUCOSE (AUTOMATED) 2022 16:22:00 AlbJames gupta [facility]

 

                POCT GLUCOSE (AUTOMATED) 2022 15:27:00 David James [facility]

 

                POCT GLUCOSE (AUTOMATED) 2022 14:17:00 [place] [facility]

 

                CBC WITHOUT DIFF 2022 13:33:00 [place] [facility]

 

                POCT GLUCOSE (AUTOMATED) 2022 13:20:00 David James [facility]

 

                POCT GLUCOSE (AUTOMATED) 2022 12:13:00 [place] [facility]

 

                XR CHEST 1 VW   2022 11:46:06 [facility]

 

                PHOSPHORUS      2022 10:54:00 [facility]

 

                MAGNESIUM       2022 10:54:00 [facility]

 

                OSMOLALITY, SERUM OR 2022 10:54:00 David [facility]



                PLASMA                                          [facility]

 

                BETA HYDROXY-BUTYRATE 2022 10:54:00 David [facility]

 

                BASIC METABOLIC PANEL (NA, 2022 10:54:00 [place] [facility]



                K, CL, CO2, GLUCOSE, BUN,                                 Medica

l Branch



                CREATININE, CA)                                 

 

                MRSA / MSSA SCREEN BY PCR, 2022 10:54:00 [place] [facility]

 

                POCT GLUCOSE (AUTOMATED) 2022 10:53:00 [place] [facility]

 

                POCT GLUCOSE (AUTOMATED) 2022 08:46:00 Mark Fofana [facility]

 

                POCT GLUCOSE (AUTOMATED) 2022 07:39:00 Mark F[address] [facility]

 

                POCT GLUCOSE (AUTOMATED) 2022 07:02:00 Mark Fofana [facility]

 

                POCT GLUCOSE (AUTOMATED) 2022 06:17:00 Mark Fofana [facility]

 

                AC PANEL 21 + LACTIC ACID 2022 05:36:00 Mark Fofana [facility]

 

                POCT GLUCOSE (AUTOMATED) 2022 04:58:00 Mark Fofana [facility]

 

                CT ABDOMEN PELVIS W 2022 04:33:00 Mark Fofana [facility]



                CONTRAST                                        [facility]

 

                COMP. METABOLIC PANEL 2022 04:20:00 Mark Fofana [facility]



                ([id number]

 

                BLOOD CULTURE SCREEN 2022 03:14:00 Mark Fofana [facility]

 

                CBC WITH DIFF   2022 03:14:00 Mark Fofana [facility]

 

                LIPASE          2022 03:13:00 Mark Fofana [facility]

 

                TROPONIN I      2022 03:13:00 Mark Fofana [facility]

 

                PROTHROMBIN TIME / INR 2022 03:13:00 Mark Fofana [facility]

 

                ACTIVATED PARTIAL THRMPLAS 2022 03:13:00 Mark Fofana

[facility]

 

                N-TERMINAL PRO-BNP 2022 03:13:00 Mark Fofana [facility]

 

                LACTIC ACID WHOLE BLOOD 2022 03:12:00 Mark Fofana [facility]

 

                EKG-12 LEAD     2022 01:55:16 [facility]



                                                [place]         [facility]

 

                BASIC METABOLIC PANEL (NA, 2022 09:31:00 Eveline [facility]



                K, CL, CO2, GLUCOSE, BUN,                                 Medica

l Branch



                CREATININE, CA)                                 

 

                CBC WITH DIFF   2022 09:31:00 Eveline [facility]

 

                BASIC METABOLIC PANEL (NA, 2022 08:57:00 Eveline [facility]



                K, CL, CO2, GLUCOSE, BUN,                                 [facility]

l [facility]



                CREATININE, CA)                                 

 

                CBC WITH DIFF   2022 08:57:00 [facility]

 

                COMP. METABOLIC PANEL 2022 10:55:00 Rand Tate  [facility]



                ([id number])                                         [facility]

 

                CBC WITH DIFF   2022 10:55:00 Letitia reymundo  [facility]

 

                N-TERMINAL PRO-BNP 2022 10:55:00 Rand Tate  [facility]

 

                MAGNESIUM       2022 11:33:00 Letitia reymundo  [facility]

 

                COMP. METABOLIC PANEL 2022 11:33:00 Rand Tate  [facility]



                ([id number])                                         Medical Branch

 

                CBC WITH DIFF   2022 11:33:00 Ashly Lees [facility]

 

                N-TERMINAL PRO-BNP 2022 11:33:00 Rand Tate  [facility]

 

                ASPIRATE OR ABSCESS 2022 21:31:00 Coleen Cerna   [facility] [place]



                CULTURE(AEROBIC/ANAEROBIC)                                 Medic

al [place]

 

                SODIUM, URINE RANDOM 2022 11:11:00 Rand Tate  [facility]

 

                PROTEIN CREAT RATIO URINE 2022 11:11:00 [facility]                                          [facility] [place]

 

                XR CHEST 1 VW   2022 11:07:43 Letitia reymundo  [facility]

 

                XR FOOT  3+ VW LEFT 2022 11:07:43 Rand Tate  [facility]

 

                URINE DRUG (IMMUNOASSAY) - 2022 11:07:00 Rand Tate

[facility]



                COMPREHENSIVE DRUG SCREEN                                 Medica

l Branch

 

                URINE DRUG (LCMSMS) - 2022 11:07:00 Rand Tate  [facility]



                SYNTHETIC OPIATES PANEL                                 Medical 

Branch

 

                PHOSPHORUS      2022 11:03:00 Rand Tate  [facility]

 

                CREATINE KINASE 2022 11:03:00 Letitia reymundo  [facility]

 

                MAGNESIUM       2022 11:03:00 Letitia reymundo  [facility]

 

                VITAMIN B12, LEVEL 2022 11:03:00 Rand Tate  [facility]

 

                COMP. METABOLIC PANEL 2022 11:03:00 Rand Tate  [facility]



                ([id number])                                         [facility]

 

                SEDIMENTATION RATE 2022 11:03:00 Rand Tate  [facility]

 

                CBC WITH DIFF   2022 11:03:00 Letitia reymundo  [facility]

 

                GLYCOSYLATED HEMOGLOBIN 2022 11:03:00 Letitia reymundo  [facility]

[facility] [place]



                (A1C)                                           [facility]

 

                N-TERMINAL PRO-BNP 2022 11:03:00 Letitia reymundo  [facility]

 

                VITAMIN D, 25-OH 2022 11:03:00 Letitia [facility]

 

                PROCALCITONIN   2022 11:03:00 Letitia [facility]

 

                CT ANGIOGRAM LOWER 2022 03:39:00 [facility]



                EXTREMITY LEFT W CONTRAST                                 Medica

l Branch

 

                CT HEAD WO CONTRAST 2022 03:25:00 Mark Fofana [facility]

 

                URINALYSIS      2022 01:22:00 Mark Fofana [facility]

 

                URINE CULTURE   2022 01:22:00 Mark Fofana [facility]

 

                BLOOD CULTURE SCREEN 2022 00:58:00 Mark Fofana [facility]

 

                PHOSPHORUS      2022 00:58:00 Letitia [facility]

 

                MAGNESIUM       2022 00:58:00 Letitia [facility]

 

                FERRITIN SERUM  2022 00:58:00 Letitia [facility]

 

                THYROID STIMULATING 2022 00:58:00 Letitia [facility]



                HORMONE                                         [facility]

 

                COMP. METABOLIC PANEL 2022 00:58:00 Mark Fofana [facility]



                ([id number])                                         [facility]

 

                LIPID PANEL ([id number])(TOTAL 2022 00:58:00 Rand Tate  [facility]



                [place]



                TRIGLYCERIDES, HDL)                                 

 

                IRON PANEL      2022 00:58:00 Letitia [facility]

 

                CBC WITH DIFF   2022 00:58:00 Mark Fofana [facility]

 

                PROTHROMBIN TIME / INR 2022 00:58:00 Mark Fofana [facility]

 

                ACTIVATED PARTIAL THRMPLAS 2022 00:58:00 Mark Fofana [facility]

 

                N-TERMINAL PRO-BNP 2022 00:58:00 Rand Tate  [facility]

 

                LACTIC ACID WHOLE BLOOD 2022 00:58:00 Mark Fofana [facility]

 

                EMERGENCY DEPARTMENT 2022-05-10 05:01:00 Doctor Unassigned, [facility]



                DOCUMENTS                       [place]         [facility]

 

                HOSPITAL ADM - MISC 2022-05-10 05:01:00 Doctor Unassigned, [facility]

 

                MAGNESIUM       2022 04:12:00 [facility]

 

                COMP. METABOLIC PANEL 2022 04:12:00 [facility]



                ([id number])                                         [facility]

 

                CBC WITH DIFF   2022 04:12:00 [facility]

 

                CT HEAD WO CONTRAST 2022 00:15:29 ALLISON Romeo [facility]

 

                URINALYSIS      2022 23:53:00 ALLISON Romeo [facility]

 

                COMP. METABOLIC PANEL 2022 23:52:00 ALLISON Romeo Elena [facility]

[facility] [place]



                ([id number])                                         [facility]

 

                CBC WITH DIFF   2022 23:52:00 ALLISON Romeo [facility]

 

                XR CHEST 1 VW   2022 03:03:32 Letitia reymundo  [facility]

 

                COMP. METABOLIC PANEL 2022 11:57:00 William [facility]



                ([id number])                                         [facility]

 

                CBC WITH DIFF   2022 11:57:00 Niurka [facility]

 

                BASIC METABOLIC PANEL (NA, 2022 12:10:00 Edionwe, [facility]



                K, CL, CO2, GLUCOSE, BUN,                                 Medica

l Branch



                CREATININE, CA)                                 

 

                CBC WITH DIFF   2022 12:09:00 Leo [facility]

 

                URINALYSIS      2022 00:40:00 Suly Luna [facility]

 

                URINE CULTURE   2022 00:40:00 Suly Luna [facility]

 

                FECES CULTURE   2022 14:58:00 [facility]

 

                OCCULT (GUAIAC) BLOOD 2022 14:58:00 Leo [facility]

 

                CLOSTRIDIUM DIFFICILE 2022 14:58:00 Leo [facility]



                TOXIN                                           [facility]

 

                FECAL PATHOGENS BY PCR 2022 14:58:00 [facility]

 

                URINE DRUG (IMMUNOASSAY) - 2022 10:11:00 Edionwe, [facility]



                COMPREHENSIVE DRUG SCREEN                                 Medica

[facility]

 

                COVID-19 (ID NOW RAPID 2022 07:33:00 Silvino Garay [facility]



                TESTING)                                        Medical Branch

 

                LAB ONLY COVID  2022 07:33:00 Silvino Garay [facility]



                INTERPRETATION                                  [facility]

 

                COMP. METABOLIC PANEL 2022 06:18:00 Silvino Garay [facility]



                ([id number])                                         [facility]

 

                CBC WITH DIFF   2022 05:40:00 Silvino Garay [facility]

 

                URINALYSIS      2022 01:43:00 Alma Villaseñor [facility]

 

                LIPASE          2022 01:40:00 Alma Villaseñor [facility]

 

                COMP. METABOLIC PANEL 2022 01:40:00 Alma Villaseñor [facility]



                ([id number])                                         [facility]

 

                CBC WITH DIFF   2022 01:38:00 Alma Villaseñor [facility]

 

                XR CHEST 1 VW   2022 23:40:19 Alma Villaseñor [facility]

 

                ASSIGNMENT OF BENEFITS 2022 22:05:40 Doctor Unassigned, [facility]



                                                [place]         [facility] [place]

 

                NOTICE OF PRIVACY 2022 22:04:58 Doctor Unassigned, [facility]

[facility] [place]



                PRACTICES                       [place]         [facility] [place]

 

                CONSENT/REFUSAL FOR 2022 22:04:33 Doctor Unassigned, [facility]



                DIAGNOSIS AND TREATMENT                 [place]         [facility]

 

                URINALYSIS      2022-01-15 23:09:00 Neeta Maria [facility]

 

                BLOOD CULTURE SCREEN 2022-01-15 23:04:00 Neeta Maria [facility]

 

                D-DIMER         2022-01-15 22:49:00 Neeta Maria [facility]

 

                COVID-19 (ID NOW RAPID 2022-01-15 22:48:00 Neeta Maria [facility]

[facility] [place]



                TESTING)                                        [facility] [place]

 

                COMP. METABOLIC PANEL 2022-01-15 22:17:00 Neeta Maria Un[facility]

[facility] [place]



                ([id number])                                         [facility]

 

                CBC WITH DIFF   2022-01-15 22:17:00 Neeta Maria [facility]

 

                XR CHEST 1 VW   2022-01-15 21:47:19 Neeta Maria [facility]

 

                COMP. METABOLIC PANEL 2021 22:20:00 Chris Mcpherson [facility]

[facility] [place]



                ([id number])                                         [facility]

 

                CBC WITH DIFF   2021 22:20:00 Chris Mcpherson Channing o

[facility]

 

                CT ABDOMEN PELVIS WO 2021-05-10 00:39:40 [facility]

[facility] [place]



                CONTRAST                                        Medical Branch

 

                LIPASE          2021-05-10 00:06:00 Bossman Villa [facility]

 

                COMP. METABOLIC PANEL 2021-05-10 00:06:00 Bossman Villa Un

[facility]



                ([id number])                                         [facility]

 

                CBC WITH DIFF   2021-05-10 00:06:00 Bossman Villa [facility]

 

                URINALYSIS      2021-05-10 00:00:00 Bossman Villa [facility]

 

                COVID-19 (ID NOW RAPID 2021-05-10 00:00:00 Bossman Villa U

[facility]



                TESTING)                                        Medical Branch

 

                Cholecystectomy                                 [facility]

 

                Shunt of cerebral                                 [facility]

nn



                ventricle to extracranial                                 



                site                                            







Plan of Care







             Planned Activity Planned Date Details      Comments     Source

 

             Future Scheduled 2023-01-10   Lipid panel               [facility]

s



             Test         00:00:00     (procedure) [code =              Medical 

Center



                                       18753429]                 

 

             Future Scheduled 2021   INFLUENZA VACCINE              [facility]



             Test         00:00:00     (Season Ended) [code =              [facility]



                                       INFLUENZA VACCINE              



                                       (Season Ended)]              

 

             Future Scheduled 2021   DEPRESSION SCREENING              [facility]



             Test         00:00:00     (12+) [code =              Medical Center



                                       DEPRESSION SCREENING              



                                       (12+)]                    

 

             Future Scheduled 2020-04-10   Hemoglobin A1c              [facility]

[facility]



             Test         00:00:00     measurement               [facility]



                                       (procedure) [code =              



                                       49175516]                 

 

             Future Scheduled 2004   DTAP/TDAP/TD VACCINES              [facility]



             Test         00:00:00     (1 - Tdap) [code =              Medical C

enter



                                       DTAP/TDAP/TD VACCINES              



                                       (1 - Tdap)]               

 

             Future Scheduled 2003   HEPATITIS C SCREENING              [facility]



             Test         00:00:00     [code = HEPATITIS C              Medical 

Center



                                       SCREENING]                

 

             Future Scheduled 1997   COVID-19 VACCINE (1)              [facility]



             Test         00:00:00     [code = COVID-19              Medical Abilio

ter



                                       VACCINE (1)]              

 

             Future Scheduled 1995   DIABETIC EYE EXAM              [facility]



             Test         00:00:00     [code = DIABETIC EYE              [facility]

 Center



                                       EXAM]                     

 

             Future Scheduled 1995   Urine screening for              [facility]



             Test         00:00:00     protein (procedure)              [facility] 

Center



                                       [code = 017357937]              

 

             Future Scheduled 1991   PNEUMOCOCCAL VACCINE              [facility]



             Test         00:00:00     0-64 YRS (1 of 1 -              Medical C

enter



                                       PPSV23) [code =              



                                       PNEUMOCOCCAL VACCINE              



                                       0-64 YRS (1 of 1 -              



                                       PPSV23)]                  







Encounters







        Start   End     Encounter Admission Attending Care    Care    Encounter 

Source



        Date/Time Date/Time Type    Type    Clinicians Facility Department ID   

   

 

        2022         Outpatient                 [facility]     -[id number]

 [place]



        08:22:27                                                 0411    [facility]

 

        2022         Outpatient                 [facility]     -[id number]

 [place]



        11:10:01                                                 [id number]    [facility]

 

        2022         Outpatient         TIKA Mc  [facility]  [id number]

 Common



        13:45:16                         Domingo                  [id number]   [facility]

 

        2022         Outpatient         Jesusita  [facility]  [id number]

 Common



        13:17:39                         Domingo                  [id number]   [facility]

 

        2022         Outpatient         Jesusita,  [facility]  [id number]

 Common



        13:16:34                         Domingo                  [id number]   [facility]

 

        2022 2022-06-10 [facility]       [facility]     [id number]

[id number] Univers



        18:25:00 19:30:00                 [facility]                           ity of



                                                                        [facility]

 

        2022 2022-06-10 Emergency         Shelton Cunningham [facility]    1.2.840.

114 [id number] 

Univers



        18:25:00 19:30:00                 Teagan IbanezCIARAN 350.1.13.10   

      ity of



                                                [place] 4.2.7.2.686         [facility]  [phone]         Medi

sarah



                                                        080             Branch

 

        2022 Transition         IMTIAZ Rodney  1.2.840.114 [id number]

[id number] Univers



        00:00:00 00:00:00 of Care         Stephanysa GAYLE DUNN   350.1.13.10         i

ty of



                                                [place]   4.2.7.2.686         [place]

[place]



                                                        [phone]         Medi

sarah



                                                        403             Branch

 

        2022 Inpatient X       [facility]    KRISH     [id number]

[id number] Univers



        20:53:00 18:25:00                 [facility]                         daniel o

[facility]

 

        2022 Hospital         Mark Fofana [facility]    1.2.840.1

14 [id number] 

Univers



        20:53:00 18:25:00 Encounter         [facility]  350.1.13.10 

        ity of



                                        Efrain Myles  4.2.7.2.686   

      [place]



                                        [place], Martha H [place]    [phone]     

    Medical



                                        [address]                 



                                                ([facility])                   

 

        2022 Transition         IMTIAZ Guzman  1.2.840.114 [id number]

[id number] Univers



        00:00:00 00:00:00 of Care         Dulce DUNN   350.1.13.10        

 ity of



                                                [place]   4.2.7.2.686         [place]

[place]



                                                        [phone]         Medi

sarah



                                                        403             Branch

 

        2022-05-10 2022 Inpatient X       LETITIA [facility]    [place]     [id number]

[id number] Univers



        19:10:00 17:32:00                 RAND                           itlissa [facility]

 

        2022-05-10 2022 [facility]         Mark Fofana [facility]    1.2.840.1

14 [id number] 

Univers



        19:10:00 17:32:00 Encounter         Rand Tate SHARI 350.1.13.10 

        ity of



                                                [place] 4.2.7.2.686         [place]

[place]



                                                CAMPUS  [phone]         Medi

sarah



                                                        081             Branch

 

        2022 Outpatient U       [place], [facility]    

    [facility]



        07:00:00 17:50:00                 ROMERO                          

 

        2022 Emergency [facility]       [facility]     [id number]

29 Univers



        22:45:00 01:52:00                 CHRIS melton [facility]

 

        2022 Emergency         [facility]    [id number]

2685 Univers



        22:45:00 01:52:00                 Chris MCCARTHY 350.1.13.10         i

ty of



                                                [place] 4.2.7.2.686         [facility]  [phone]         [address]

 

        2022 Emergency X       ALLISON ROMEO [facility]    ERT     [id number]

[id number] Univers



        18:08:00 22:51:00                                                 ity of



                                                                        [facility]

 

        2022 Emergency         ALLISON Romeo [facility]    1.2.840.114 [id number]

[id number] Univers



        18:08:00 22:51:00                 Elena MCCARTHY 350.1.13.10         i

ty of



                                                [facility] 4.2.7.2.686         [facility]  [phone]         Medi

sarah



                                                        084             Branch

 

        2022 Transition         IMTIAZ Guzman  1.2.840.114 [id number]

[id number] Univers



        00:00:00 00:00:00 of Milka Adamesoinette DUNN   350.1.13.10        

 ity of



                                                [place]   4.2.7.2.686         [place]

s



                                                        [phone]         Medi

sarah



                                                        403             Branch

 

        2022 Inpatient X       LESLEY [facility]    [place]     [id number]

27 Univers



        19:07:00 19:39:00                 [place]O                          [facility]

 

        2022 [facility]         Silvino Garay [facility]    1.2.840.

114 [id number] 

Univers



        19:07:00 19:39:00 Encounter         Teagan Ibanez SHARI 350.1.13.10 

        ity of



                                                JAY JAYANIBAL 4.2.7.2.686         [facility]  [phone]         [address]

 

        2022 Emergency X       [facility], [facility]    ERT     [id number]

54 Univers



        14:41:00 22:07:00                 ALMA melton [facility]

 

        2022 Emergency         [place], [facility]    1.2.424.018 [id number]

[id number] Univers



        14:41:00 22:07:00                 Alma MCCARTHY 350.1.13.10         

ity of



                                                [place] 4.2.7.2.686         [facility]  [phone]         [address]

 

        2022-01-15 2022-01-15 Emergency X       [facility]    ERT     [id number]

25 Univers



        14:49:00 21:33:00                 NEETA melton [facility]

 

        2022-01-15 2022-01-15 Emergency         [facility]    1.2.813.673 [id number]

0725 Univers



        14:49:00 21:33:00                 Neeta MCCARTHY 350.1.13.10         

ity of



                                                [place] 4.2.7.2.686         [facility]  [phone]         [address]

 

        2021 Laboratory         Only, Ang Db Test [facility]    1.2.8

40.114 [id number] 

Univers



        18:00:00 18:15:00 Only            Nicole Llanes [facility]  350.1.13.10      

   ity of



                                                SHARI 4.2.7.2.686         Eric

as



                                                HÉCTOR?BLEA [phone]         Me

stella



                                                [address]



                                                MEDICAL                 



                                                OFFICE                  



                                                BUILDING                 

 

        2021 Outpatient LESLIE LLANES   [facility]    [id number]

[id number] Univers



        18:00:00 18:00:00                 NICOLE                          daniellissa [facility]

 

        2021 ambulatory                 STLMLC  STLMLC  [id number]

 Common



        00:00:00 00:00:00                                                 [facility]

 

        2021 ambulatory                 STLMLC  STLMLC  [id number]

 Common



        00:00:00 00:00:00                                                 [facility]

 

        2021 Emergency X       [facility]     [id number]

74 Univers



        15:54:00 18:34:00                 CHRIS melton [facility]

 

        2021 [facility]    [id number] [id number]

[id number] Univers



        15:54:00 18:34:00                 Chris MCCARTHY 350.1.13.10         [facility] 4.2.7.2.686         [place]

[facility]  [phone]         Medi

sarah



                                                        084             Branch

 

        2021-10-12 2021-10-12 Outpatient                 STLMLC  STLMLC  [id number]

 Common



        00:00:00 00:00:00                                                 [facility]

 

        2021 Outpatient                 STLMLC  STLMLC  [id number]

 Common



        00:00:00 00:00:00                                                 [facility]

 

        2021-08-10 2021-08-10 Outpatient                 STLMLC  STLMLC  [id number]

 Common



        00:00:00 00:00:00                                                 [facility]

 

        2021 Outpatient                 STLMLC  STLMLC  [id number]

 Common



        00:00:00 00:00:00                                                 [facility]

 

        2021 Outpatient                 STLMLC  STLMLC  [id number]

 Common



        00:00:00 00:00:00                                                 [facility]

 

        2021 Emergency         Daisy [facility]    1.2.840.114 [id number]

[id number] 



        18:22:00 22:21:00                 Bossman Anguianoton 350.1.13.10        

 



                                                [place] 4.2.7.2.686         



                                                [place]  [phone]         



                                                        [facility]             

 

        2021 Emergency         [facility]    1.2.840.114 [id number]

[id number] [facility]



        18:22:00 22:21:00                 Sonio TWAN Donnellson 350.1.13.10        

 ity [facility] 4.2.7.2.686         [facility]  [phone]         [address]

 

        2021 Emergency X       [facility]    ERT     [id number]

[id number] Univers



        18:22:00 18:22:00                 FOLUSHO                         ity [facility]

 

        2019 Phone                   nullFlavo MNA     [id number]

55 Memoria



        16:11:26 04:59:59 Message                 r       Neurosurger 08      l



                                                        y [facility]

 

        2019 Phone                   nullFlavo MNA     [id number]

55 Memoria



        16:16:47 04:59:59 Message                 r       Neurosurger 07      l



                                                        y [facility]

 

        2019-07-15 2019 Phone                   nullFlavo MNA     [id number]

55 Memoria



        15:52:03 04:59:59 Message                 r       Neurosurger 06      l



                                                        y [facility]

 

        2019 Phone                   nullFlavo MNA     [id number]

55 Memoria



        18:55:03 04:59:59 Message                 r       Neurosurger 05      l



                                                        y [facility]

 

        2018 Emergency                 nullFlavo [facility] [id number]

[id number] Memoria



        10:12:00 18:24:00                         leslie       [address]

 

        2018 Outpatient                 [facility]

[id number] Common



        11:15:00 11:15:00                         t VFA         [facility]

it



                                                Drive   [facility]

 

        2018 Outpatient                 [facility] [address]

[id number] Common



        11:30:00 11:30:00                         t VFA         [facility]

it



                                                Drive   [facility]

 

        2018 Outpatient                 Hollie Brownet 14

[id number] Common



        15:41:00 15:41:00                         t [place]   [address]         Spir

it



                                                Drive   [facility]

 

        2018 Outpatient                 Hollie Brownet 14

[id number] Common



        15:42:00 15:42:00                         t [place]   [address]         Spir

it



                                                Drive   [facility]

 

        2018 Outpatient                 Hollie Brownet 13

[id number] Common



        11:00:00 11:00:00                         t [place]   [address]         Spir

it



                                                Drive   [facility]

 

        2018 Inpatient                 [facility] [id number]

[id number] Memoria



        22:40:00 17:28:00                         [address]







Results







           Test Description Test Time  Test Comments Results    Result Comments 

Source









                    POCT GLUCOSE (AUTOMATED) 2022-06-10 17:25:00 









                      Test Item  Value      Reference Range Interpretation Comme

nts









             POCT GLU (test code = 3458074828) 249 mg/dL           H      

      

 

             Lab Interpretation (test code = 36398-9) Abnormal                  

             



[facility] 12:54:01





             Test Item    Value        Reference Range Interpretation Comments

 

             POCT GLU (test code = 9057779058) 188 mg/dL           H      

      

 

             Lab Interpretation (test code = Abnormal                           

    



             03576-1)                                            



[facility] METABOLIC PANEL (NA, K, CL, CO2, 
GLUCOSE, BUN, CREATININE, CA)2022-06-10 11:48:23





             Test Item    Value        Reference Range Interpretation Comments

 

             NA (test code = 137 mmol/L   135-145                   



             4778314886)                                         

 

             K (test code = 4.3 mmol/L   3.5-5.0                   



             7312329609)                                         

 

             CL (test code = 105 mmol/L                       



             6263540795)                                         

 

             CO2 TOTAL (test code = 23 mmol/L    23-31                     



             3399830638)                                         

 

             AGAP (test code =              2-16                      



             0383114612)                                         

 

             BUN (test code = 19 mg/dL     7-23                      



             8471928372)                                         

 

             GLUCOSE (test code = 243 mg/dL           H            



             2212123303)                                         

 

             CREATININE (test code = 0.50 mg/dL   0.60-1.25    L            



             4992536577)                                         

 

             CALCIUM (test code = 8.9 mg/dL    8.6-10.6                  



             0509794332)                                         

 

             eGFR (test code =              mL/min/1.73m2              



             1060330423)                                         

 

             MAL (test code = MAL) Association of                           



                          Glomerular Filtration                           



                          Rate (GFR) and Staging                           



                          of Kidney Disease*                           



                          +---------------------                           



                          --+-------------------                           



                          --+-------------------                           



                          ------+| GFR                           



                          (mL/min/1.73 m2) ?|                           



                          With Kidney Damage ?|                           



                          ?Without Kidney                           



                          Damage+---------------                           



                          --------+-------------                           



                          --------+-------------                           



                          ------------+| ?>90 ?                           



                          ? ? ? ? ? ? ? ?|                           



                          ?Stage one ? ? ? ? ?|                           



                          ? Normal ? ? ? ? ? ? ?                           



                          ?+--------------------                           



                          ---+------------------                           



                          ---+------------------                           



                          -------+| ?60-89 ? ? ?                           



                          ? ? ? ? ?| ?Stage two                           



                          ? ? ? ? ?| ? Decreased                           



                          GFR ? ? ? ?                            



                          +---------------------                           



                          --+-------------------                           



                          --+-------------------                           



                          ------+| ?30-59 ? ? ?                           



                          ? ? ? ? ?| ?Stage                           



                          three ? ? ? ?| ? Stage                           



                          three ? ? ? ? ?                           



                          +---------------------                           



                          --+-------------------                           



                          --+-------------------                           



                          ------+| ?15-29 ? ? ?                           



                          ? ? ? ? ?| ?Stage four                           



                          ? ? ? ? | ? Stage four                           



                          ? ? ? ? ?                              



                          ?+--------------------                           



                          ---+------------------                           



                          ---+------------------                           



                          -------+| ?<15 (or                           



                          dialysis) ? ?| ?Stage                           



                          five ? ? ? ? | ? Stage                           



                          five ? ? ? ? ?                           



                          ?+--------------------                           



                          ---+------------------                           



                          ---+------------------                           



                          -------+ *Each stage                           



                          assumes the associated                           



                          GFR level has been in                           



                          effect for at least                           



                          three months. ?Stages                           



                          1 to 5, with or                           



                          without kidney                           



                          disease, indicate                           



                          chronic kidney                           



                          disease. Notes:                           



                          Determination of                           



                          stages one and two                           



                          (with eGFR                             



                          >59mL/min/1.73 m2)                           



                          requires estimation of                           



                          kidney damage for at                           



                          least three months as                           



                          defined by structural                           



                          or functional                           



                          abnormalities of the                           



                          kidney, manifested by                           



                          either:Pathological                           



                          abnormalities or                           



                          Markers of kidney                           



                          damage (including                           



                          abnormalities in the                           



                          composition of the                           



                          blood or urine or                           



                          abnormalities in                           



                          imaging tests).                           

 

             Lab Interpretation Abnormal                               



             (test code = 76597-6)                                        



[facility]





             Test Item    Value        Reference Range Interpretation Comments

 

             MAGNESIUM (test code = 4718334894) 2.1 mg/dL    1.7-2.4            

       

 

             Lab Interpretation (test code = Normal                             

    



             98783-2)                                            



[facility] WITH DIFF2022-06-10 11:09:44





             Test Item    Value        Reference Range Interpretation Comments

 

             WBC (test code =              See_Comment                [Automated

 message]



             6690-2)                                             The system [employer]



                                                                 generated this 

result



                                                                 transmitted ref

erence



                                                                 range: 4.20 - 1

0.70



                                                                 10*3/?L. The re

ference



                                                                 range was not u

sed to



                                                                 interpret this 

result



                                                                 as normal/abnor

mal.

 

             RBC (test code =              See_Comment                [Automated

 message]



             789-8)                                              The system [employer]



                                                                 generated this 

result



                                                                 transmitted ref

erence



                                                                 range: 4.26 - 5

.52



                                                                 10*6/?L. The re

ference



                                                                 range was not u

sed to



                                                                 interpret this 

result



                                                                 as normal/abnor

mal.

 

             HGB (test code = 15.9 g/dL    12.2-16.4                 



             718-7)                                              

 

             HCT (test code = 46.8 %       38.4-49.3                 



             4544-3)                                             

 

             MCV (test code = 87.5 fL      81.7-95.6                 



             787-2)                                              

 

             MCH (test code = 29.7 pg      26.1-32.7                 



             785-6)                                              

 

             MCHC (test code = 34.0 g/dL    31.2-35.0                 



             786-4)                                              

 

             RDW-SD (test code 43.6 fL      38.5-51.6                 



             = 25608-8)                                          

 

             RDW-CV (test code 13.7 %       12.1-15.4                 



             = 788-0)                                            

 

             PLT (test code =              See_Comment                [Automated

 message]



             777-3)                                              The system [employer]



                                                                 generated this 

result



                                                                 transmitted ref

erence



                                                                 range: 150 - 32

8



                                                                 10*3/?L. The re

ference



                                                                 range was not u

sed to



                                                                 interpret this 

result



                                                                 as normal/abnor

mal.

 

             MPV (test code = 11.0 fL      9.8-13.0                  



             25239-5)                                            

 

             NRBC/100 WBC (test              See_Comment                [Automat

ed message]



             code = 7716828466)                                        The [employer] which



                                                                 generated this 

result



                                                                 transmitted ref

erence



                                                                 range: 0.0 - 10

.0 /100



                                                                 WBCs. The refer

ence



                                                                 range was not u

sed to



                                                                 interpret this 

result



                                                                 as normal/abnor

mal.

 

             NRBC x10^3 (test <0.01        See_Comment                [Automated

 message]



             code = 2397335650)                                        The [employer]

[employer] which



                                                                 generated this 

result



                                                                 transmitted ref

erence



                                                                 range: 10*3/?L.

 The



                                                                 reference range

 was not



                                                                 used to interpr

et this



                                                                 result as



                                                                 normal/abnormal

.

 

             GRAN MAT (NEUT) % 57.9 %                                 



             (test code =                                        



             770-8)                                              

 

             IMM GRAN % (test 0.60 %                                 



             code = 5414667671)                                        

 

             LYMPH % (test code 30.7 %                                 



             = 736-9)                                            

 

             MONO % (test code 8.5 %                                  



             = 5905-5)                                           

 

             EOS % (test code = 2.0 %                                  



             713-8)                                              

 

             BASO % (test code 0.3 %                                  



             = 706-2)                                            

 

             GRAN MAT     5.14 10*3/uL 1.99-6.95                 



             x10^3(ANC) (test                                        



             code = 7286234715)                                        

 

             IMM GRAN x10^3 0.05 10*3/uL 0.00-0.06                 



             (test code =                                        



             6793638933)                                         

 

             LYMPH x10^3 (test 2.73 10*3/uL 1.09-3.23                 



             code = 731-0)                                        

 

             MONO x10^3 (test 0.76 10*3/uL 0.36-1.02                 



             code = 742-7)                                        

 

             EOS x10^3 (test 0.18 10*3/uL 0.06-0.53                 



             code = 711-2)                                        

 

             BASO x10^3 (test 0.03 10*3/uL 0.01-0.09                 



             code = 704-7)                                        



[facility] GLUCOSE (AUTOMATED)2022-06-10 10:34:14





             Test Item    Value        Reference Range Interpretation Comments

 

             POCT GLU (test code = 1766367211) 230 mg/dL           H      

      

 

             Lab Interpretation (test code = Abnormal                           

    



             81103-5)                                            



[facility] GLUCOSE (AUTOMATED)2022-06-10 05:56:50





             Test Item    Value        Reference Range Interpretation Comments

 

             POCT GLU (test code = 1014019748) 314 mg/dL           H      

      

 

             Lab Interpretation (test code = Abnormal                           

    



             49017-6)                                            



[facility] GLUCOSE (AUTOMATED)2022-06-10 04:40:22





             Test Item    Value        Reference Range Interpretation Comments

 

             POCT GLU (test code = 7243253648) 324 mg/dL           H      

      

 

             Lab Interpretation (test code = Abnormal                           

    



             84887-9)                                            



[facility] GLUCOSE (AUTOMATED)2022-06-10 02:37:33





             Test Item    Value        Reference Range Interpretation Comments

 

             POCT GLU (test code = 1423752389) 296 mg/dL           H      

      

 

             Lab Interpretation (test code = Abnormal                           

    



             86451-0)                                            



[facility] GLUCOSE (AUTOMATED)2022-06-10 01:05:02





             Test Item    Value        Reference Range Interpretation Comments

 

             POCT GLU (test code = 4836077349) 319 mg/dL           H      

      

 

             Lab Interpretation (test code = Abnormal                           

    



             00653-1)                                            



[facility] GLUCOSE (AUTOMATED)2022 21:59:09





             Test Item    Value        Reference Range Interpretation Comments

 

             POCT GLU (test code = 0599269690) 257 mg/dL           H      

      

 

             Lab Interpretation (test code = Abnormal                           

    



             40488-4)                                            



[facility] GLUCOSE (AUTOMATED)2022 17:21:41





             Test Item    Value        Reference Range Interpretation Comments

 

             POCT GLU (test code = 8279598511) 214 mg/dL           H      

      

 

             Lab Interpretation (test code = Abnormal                           

    



             50024-8)                                            



[facility] GLUCOSE (AUTOMATED)2022 13:04:04





             Test Item    Value        Reference Range Interpretation Comments

 

             POCT GLU (test code = 6165697287) 234 mg/dL           H      

      

 

             Lab Interpretation (test code = Abnormal                           

    



             06799-2)                                            



[facility] METABOLIC PANEL (NA, K, CL, CO2, 
GLUCOSE, BUN, CREATININE, CA)2022 12:23:33





             Test Item    Value        Reference Range Interpretation Comments

 

             NA (test code = 136 mmol/L   135-145                   



             6841702472)                                         

 

             K (test code = 4.1 mmol/L   3.5-5.0                   



             9256441767)                                         

 

             CL (test code = 109 mmol/L          H            



             4408147299)                                         

 

             CO2 TOTAL (test code = 20 mmol/L    23-31        L            



             6206650576)                                         

 

             AGAP (test code =              2-16                      



             9885341242)                                         

 

             BUN (test code = 16 mg/dL     7-23                      



             7122780592)                                         

 

             GLUCOSE (test code = 281 mg/dL           H            



             7928551375)                                         

 

             CREATININE (test code = 0.50 mg/dL   0.60-1.25    L            



             2338728092)                                         

 

             CALCIUM (test code = 8.3 mg/dL    8.6-10.6     L            



             0506788072)                                         

 

             eGFR (test code =              mL/min/1.73m2              



             1811039532)                                         

 

             MAL (test code = MAL) Association of                           



                          Glomerular Filtration                           



                          Rate (GFR) and Staging                           



                          of Kidney Disease*                           



                          +---------------------                           



                          --+-------------------                           



                          --+-------------------                           



                          ------+| GFR                           



                          (mL/min/1.73 m2) ?|                           



                          With Kidney Damage ?|                           



                          ?Without Kidney                           



                          Damage+---------------                           



                          --------+-------------                           



                          --------+-------------                           



                          ------------+| ?>90 ?                           



                          ? ? ? ? ? ? ? ?|                           



                          ?Stage one ? ? ? ? ?|                           



                          ? Normal ? ? ? ? ? ? ?                           



                          ?+--------------------                           



                          ---+------------------                           



                          ---+------------------                           



                          -------+| ?60-89 ? ? ?                           



                          ? ? ? ? ?| ?Stage two                           



                          ? ? ? ? ?| ? Decreased                           



                          GFR ? ? ? ?                            



                          +---------------------                           



                          --+-------------------                           



                          --+-------------------                           



                          ------+| ?30-59 ? ? ?                           



                          ? ? ? ? ?| ?Stage                           



                          three ? ? ? ?| ? Stage                           



                          three ? ? ? ? ?                           



                          +---------------------                           



                          --+-------------------                           



                          --+-------------------                           



                          ------+| ?15-29 ? ? ?                           



                          ? ? ? ? ?| ?Stage four                           



                          ? ? ? ? | ? Stage four                           



                          ? ? ? ? ?                              



                          ?+--------------------                           



                          ---+------------------                           



                          ---+------------------                           



                          -------+| ?<15 (or                           



                          dialysis) ? ?| ?Stage                           



                          five ? ? ? ? | ? Stage                           



                          five ? ? ? ? ?                           



                          ?+--------------------                           



                          ---+------------------                           



                          ---+------------------                           



                          -------+ *Each stage                           



                          assumes the associated                           



                          GFR level has been in                           



                          effect for at least                           



                          three months. ?Stages                           



                          1 to 5, with or                           



                          without kidney                           



                          disease, indicate                           



                          chronic kidney                           



                          disease. Notes:                           



                          Determination of                           



                          stages one and two                           



                          (with eGFR                             



                          >59mL/min/1.73 m2)                           



                          requires estimation of                           



                          kidney damage for at                           



                          least three months as                           



                          defined by structural                           



                          or functional                           



                          abnormalities of the                           



                          kidney, manifested by                           



                          either:Pathological                           



                          abnormalities or                           



                          Markers of kidney                           



                          damage (including                           



                          abnormalities in the                           



                          composition of the                           



                          blood or urine or                           



                          abnormalities in                           



                          imaging tests).                           

 

             Lab Interpretation Abnormal                               



             (test code = 79625-1)                                        



[facility]





             Test Item    Value        Reference Range Interpretation Comments

 

             MAGNESIUM (test code = 4445445830) 1.6 mg/dL    1.7-2.4      L     

       

 

             Lab Interpretation (test code = Abnormal                           

    



             63087-3)                                            



[facility]13





             Test Item    Value        Reference Range Interpretation Comments

 

             PHOSPHORUS (test code = 8052083159) 3.2 mg/dL    2.5-5.0           

        

 

             Lab Interpretation (test code = Normal                             

    



             97907-2)                                            



[facility] 
10:04:37





             Test Item    Value        Reference Range Interpretation Comments

 

             HGB A1C (test code = 9.6 %        4.0-5.7      H            



             4548-4)                                             

 

             MAL (test code = MAL) Reference                              



                          RangesNormal:                           



                          <5.7%Prediabetes:                           



                          5.7 - 6.4%Diabetes:                           



                          > 6.5%                                 

 

             Lab Interpretation (test Abnormal                               



             code = 96588-6)                                        



[facility] WITH [id number] 09:29:41





             Test Item    Value        Reference Range Interpretation Comments

 

             WBC (test code =              See_Comment                [Automated

 message]



             6690-2)                                             The system [employer]



                                                                 generated this 

result



                                                                 transmitted ref

erence



                                                                 range: 4.20 - 1

0.70



                                                                 10*3/?L. The re

ference



                                                                 range was not u

sed to



                                                                 interpret this 

result



                                                                 as normal/abnor

mal.

 

             RBC (test code =              See_Comment                [Automated

 message]



             789-8)                                              The system [employer]



                                                                 generated this 

result



                                                                 transmitted ref

erence



                                                                 range: 4.26 - 5

.52



                                                                 10*6/?L. The re

ference



                                                                 range was not u

sed to



                                                                 interpret this 

result



                                                                 as normal/abnor

mal.

 

             HGB (test code = 14.7 g/dL    12.2-16.4                 



             718-7)                                              

 

             HCT (test code = 43.3 %       38.4-49.3                 



             4544-3)                                             

 

             MCV (test code = 86.9 fL      81.7-95.6                 



             787-2)                                              

 

             MCH (test code = 29.5 pg      26.1-32.7                 



             785-6)                                              

 

             MCHC (test code = 33.9 g/dL    31.2-35.0                 



             786-4)                                              

 

             RDW-SD (test code 42.5 fL      38.5-51.6                 



             = 84198-6)                                          

 

             RDW-CV (test code 13.5 %       12.1-15.4                 



             = 788-0)                                            

 

             PLT (test code =              See_Comment                [Automated

 message]



             777-3)                                              The system [employer]



                                                                 generated this 

result



                                                                 transmitted ref

erence



                                                                 range: 150 - 32

8



                                                                 10*3/?L. The re

ference



                                                                 range was not u

sed to



                                                                 interpret this 

result



                                                                 as normal/abnor

mal.

 

             MPV (test code = 10.8 fL      9.8-13.0                  



             30124-3)                                            

 

             NRBC/100 WBC (test              See_Comment                [Automat

ed message]



             code = 1494125385)                                        The syste

[employer] which



                                                                 generated this 

result



                                                                 transmitted ref

erence



                                                                 range: 0.0 - 10

.0 /100



                                                                 WBCs. The refer

ence



                                                                 range was not u

sed to



                                                                 interpret this 

result



                                                                 as normal/abnor

mal.

 

             NRBC x10^3 (test <0.01        See_Comment                [Automated

 message]



             code = 9043994957)                                        The syste

m which



                                                                 generated this 

result



                                                                 transmitted ref

erence



                                                                 range: 10*3/?L.

 The



                                                                 reference range

 was not



                                                                 used to interpr

et this



                                                                 result as



                                                                 normal/abnormal

.

 

             GRAN MAT (NEUT) % 58.6 %                                 



             (test code =                                        



             770-8)                                              

 

             IMM GRAN % (test 0.50 %                                 



             code = 9654964336)                                        

 

             LYMPH % (test code 31.3 %                                 



             = 736-9)                                            

 

             MONO % (test code 6.9 %                                  



             = 5905-5)                                           

 

             EOS % (test code = 2.4 %                                  



             713-8)                                              

 

             BASO % (test code 0.3 %                                  



             = 706-2)                                            

 

             GRAN MAT     5.39 10*3/uL 1.99-6.95                 



             x10^3(ANC) (test                                        



             code = 2452975632)                                        

 

             IMM GRAN x10^3 0.05 10*3/uL 0.00-0.06                 



             (test code =                                        



             6734946524)                                         

 

             LYMPH x10^3 (test 2.89 10*3/uL 1.09-3.23                 



             code = 731-0)                                        

 

             MONO x10^3 (test 0.64 10*3/uL 0.36-1.02                 



             code = 742-7)                                        

 

             EOS x10^3 (test 0.22 10*3/uL 0.06-0.53                 



             code = 711-2)                                        

 

             BASO x10^3 (test 0.03 10*3/uL 0.01-0.09                 



             code = 704-7)                                        



[facility] GLUCOSE (AUTOMATED)2022 09:06:33





             Test Item    Value        Reference Range Interpretation Comments

 

             POCT GLU (test code = 9659918465) 206 mg/dL           H      

      

 

             Lab Interpretation (test code = Abnormal                           

    



             86151-2)                                            



[facility] GLUCOSE (AUTOMATED)2022 04:38:41





             Test Item    Value        Reference Range Interpretation Comments

 

             POCT GLU (test code = 7394095563) 272 mg/dL           H      

      

 

             Lab Interpretation (test code = Abnormal                           

    



             56025-0)                                            



[facility] GLUCOSE (AUTOMATED)2022 01:14:47





             Test Item    Value        Reference Range Interpretation Comments

 

             POCT GLU (test code = 9832879285) 256 mg/dL           H      

      

 

             Lab Interpretation (test code = Abnormal                           

    



             65546-1)                                            



[facility] GLUCOSE (AUTOMATED)2022 21:11:19





             Test Item    Value        Reference Range Interpretation Comments

 

             POCT GLU (test code = 9547471569) 254 mg/dL           H      

      

 

             Lab Interpretation (test code = Abnormal                           

    



             11511-4)                                            



[facility] GLUCOSE (AUTOMATED)2022 17:10:33





             Test Item    Value        Reference Range Interpretation Comments

 

             POCT GLU (test code = 8488536997) 350 mg/dL           H      

      

 

             Lab Interpretation (test code = Abnormal                           

    



             87829-0)                                            



[facility] GLUCOSE (AUTOMATED)2022 12:50:18





             Test Item    Value        Reference Range Interpretation Comments

 

             POCT GLU (test code = 1184807707) 269 mg/dL           H      

      

 

             Lab Interpretation (test code = Abnormal                           

    



             86197-1)                                            



[facility] Metabolic Panel (NA, K, CL, CO2, 
GLUCOSE, BUN, CREATININE, CA)2022 10:18:27





             Test Item    Value        Reference Range Interpretation Comments

 

             NA (test code = 137 mmol/L   135-145                   



             2763938928)                                         

 

             K (test code = 4.0 mmol/L   3.5-5.0                   



             5975033379)                                         

 

             CL (test code = 108 mmol/L                       



             2468247043)                                         

 

             CO2 TOTAL (test code = 23 mmol/L    23-31                     



             0795029165)                                         

 

             AGAP (test code =              2-16                      



             2866483573)                                         

 

             BUN (test code = 16 mg/dL     7-23                      



             3034964728)                                         

 

             GLUCOSE (test code = 386 mg/dL           H            



             5191325523)                                         

 

             CREATININE (test code = 0.70 mg/dL   0.60-1.25                 



             8945117285)                                         

 

             CALCIUM (test code = 8.6 mg/dL    8.6-10.6                  



             3495620282)                                         

 

             eGFR (test code =              mL/min/1.73m2              



             2928000627)                                         

 

             MAL (test code = MAL) Association of                           



                          Glomerular Filtration                           



                          Rate (GFR) and Staging                           



                          of Kidney Disease*                           



                          +---------------------                           



                          --+-------------------                           



                          --+-------------------                           



                          ------+| GFR                           



                          (mL/min/1.73 m2) ?|                           



                          With Kidney Damage ?|                           



                          ?Without Kidney                           



                          Damage+---------------                           



                          --------+-------------                           



                          --------+-------------                           



                          ------------+| ?>90 ?                           



                          ? ? ? ? ? ? ? ?|                           



                          ?Stage one ? ? ? ? ?|                           



                          ? Normal ? ? ? ? ? ? ?                           



                          ?+--------------------                           



                          ---+------------------                           



                          ---+------------------                           



                          -------+| ?60-89 ? ? ?                           



                          ? ? ? ? ?| ?Stage two                           



                          ? ? ? ? ?| ? Decreased                           



                          GFR ? ? ? ?                            



                          +---------------------                           



                          --+-------------------                           



                          --+-------------------                           



                          ------+| ?30-59 ? ? ?                           



                          ? ? ? ? ?| ?Stage                           



                          three ? ? ? ?| ? Stage                           



                          three ? ? ? ? ?                           



                          +---------------------                           



                          --+-------------------                           



                          --+-------------------                           



                          ------+| ?15-29 ? ? ?                           



                          ? ? ? ? ?| ?Stage four                           



                          ? ? ? ? | ? Stage four                           



                          ? ? ? ? ?                              



                          ?+--------------------                           



                          ---+------------------                           



                          ---+------------------                           



                          -------+| ?<15 (or                           



                          dialysis) ? ?| ?Stage                           



                          five ? ? ? ? | ? Stage                           



                          five ? ? ? ? ?                           



                          ?+--------------------                           



                          ---+------------------                           



                          ---+------------------                           



                          -------+ *Each stage                           



                          assumes the associated                           



                          GFR level has been in                           



                          effect for at least                           



                          three months. ?Stages                           



                          1 to 5, with or                           



                          without kidney                           



                          disease, indicate                           



                          chronic kidney                           



                          disease. Notes:                           



                          Determination of                           



                          stages one and two                           



                          (with eGFR                             



                          >59mL/min/1.73 m2)                           



                          requires estimation of                           



                          kidney damage for at                           



                          least three months as                           



                          defined by structural                           



                          or functional                           



                          abnormalities of the                           



                          kidney, manifested by                           



                          either:Pathological                           



                          abnormalities or                           



                          Markers of kidney                           



                          damage (including                           



                          abnormalities in the                           



                          composition of the                           



                          blood or urine or                           



                          abnormalities in                           



                          imaging tests).                           

 

             Lab Interpretation Abnormal                               



             (test code = 07913-0)                                        



[facility] with [id number] 10:03:31





             Test Item    Value        Reference Range Interpretation Comments

 

             WBC (test code =              See_Comment                [Automated

 message]



             6690-2)                                             The system [employer]



                                                                 generated this 

result



                                                                 transmitted ref

erence



                                                                 range: 4.20 - 1

0.70



                                                                 10*3/?L. The re

ference



                                                                 range was not u

sed to



                                                                 interpret this 

result



                                                                 as normal/abnor

mal.

 

             RBC (test code =              See_Comment                [Automated

 message]



             789-8)                                              The system [employer]



                                                                 generated this 

result



                                                                 transmitted ref

erence



                                                                 range: 4.26 - 5

.52



                                                                 10*6/?L. The re

ference



                                                                 range was not u

sed to



                                                                 interpret this 

result



                                                                 as normal/abnor

mal.

 

             HGB (test code = 15.5 g/dL    12.2-16.4                 



             718-7)                                              

 

             HCT (test code = 45.0 %       38.4-49.3                 



             4544-3)                                             

 

             MCV (test code = 85.9 fL      81.7-95.6                 



             787-2)                                              

 

             MCH (test code = 29.6 pg      26.1-32.7                 



             785-6)                                              

 

             MCHC (test code = 34.4 g/dL    31.2-35.0                 



             786-4)                                              

 

             RDW-SD (test code 42.5 fL      38.5-51.6                 



             = 41777-5)                                          

 

             RDW-CV (test code 13.5 %       12.1-15.4                 



             = 788-0)                                            

 

             PLT (test code =              See_Comment                [Automated

 message]



             777-3)                                              The system whic

h



                                                                 generated this 

result



                                                                 transmitted ref

erence



                                                                 range: 150 - 32

8



                                                                 10*3/?L. The re

ference



                                                                 range was not u

sed to



                                                                 interpret this 

result



                                                                 as normal/abnor

mal.

 

             MPV (test code = 11.2 fL      9.8-13.0                  



             27782-8)                                            

 

             NRBC/100 WBC (test              See_Comment                [Automat

ed message]



             code = 9828840717)                                        The syste

m which



                                                                 generated this 

result



                                                                 transmitted ref

erence



                                                                 range: 0.0 - 10

.0 /100



                                                                 WBCs. The refer

ence



                                                                 range was not u

sed to



                                                                 interpret this 

result



                                                                 as normal/abnor

mal.

 

             NRBC x10^3 (test <0.01        See_Comment                [Automated

 message]



             code = 2683746938)                                        The syste

m which



                                                                 generated this 

result



                                                                 transmitted ref

erence



                                                                 range: 10*3/?L.

 The



                                                                 reference range

 was not



                                                                 used to interpr

et this



                                                                 result as



                                                                 normal/abnormal

.

 

             GRAN MAT (NEUT) % 63.5 %                                 



             (test code =                                        



             770-8)                                              

 

             IMM GRAN % (test 0.30 %                                 



             code = 6399686732)                                        

 

             LYMPH % (test code 24.8 %                                 



             = 736-9)                                            

 

             MONO % (test code 8.7 %                                  



             = 5905-5)                                           

 

             EOS % (test code = 2.5 %                                  



             713-8)                                              

 

             BASO % (test code 0.2 %                                  



             = 706-2)                                            

 

             GRAN MAT     6.05 10*3/uL 1.99-6.95                 



             x10^3(ANC) (test                                        



             code = 7189177918)                                        

 

             IMM GRAN x10^3 0.03 10*3/uL 0.00-0.06                 



             (test code =                                        



             0248649555)                                         

 

             LYMPH x10^3 (test 2.37 10*3/uL 1.09-3.23                 



             code = 731-0)                                        

 

             MONO x10^3 (test 0.83 10*3/uL 0.36-1.02                 



             code = 742-7)                                        

 

             EOS x10^3 (test 0.24 10*3/uL 0.06-0.53                 



             code = 711-2)                                        

 

             BASO x10^3 (test <0.03        0.01-0.09                 



             code = 704-7)                                        



[facility] GLUCOSE (AUTOMATED)2022 09:00:15





             Test Item    Value        Reference Range Interpretation Comments

 

             POCT GLU (test code = 7279276303) 402 mg/dL           H      

      

 

             Lab Interpretation (test code = Abnormal                           

    



             15896-9)                                            



[facility] GLUCOSE (AUTOMATED)2022 04:54:36





             Test Item    Value        Reference Range Interpretation Comments

 

             POCT GLU (test code = 4047165065) 368 mg/dL           H      

      

 

             Lab Interpretation (test code = Abnormal                           

    



             43900-3)                                            



[facility] GLUCOSE (AUTOMATED)2022 03:13:40





             Test Item    Value        Reference Range Interpretation Comments

 

             POCT GLU (test code = 6510839334) 438 mg/dL           H      

      

 

             Lab Interpretation (test code = Abnormal                           

    



             52252-7)                                            



[id number] 
[facility]:05:39





             Test Item    Value        Reference Range Interpretation Comments

 

             NA (test code = 133 mmol/L   135-145      L            



             5864516757)                                         

 

             K (test code = 4.7 mmol/L   3.5-5.0                   



             0331650438)                                         

 

             CL (test code = 99 mmol/L                        



             5145847080)                                         

 

             CO2 TOTAL (test code = 21 mmol/L    23-31        L            



             5692719638)                                         

 

             AGAP (test code =              2-16                      



             8925523516)                                         

 

             BUN (test code = 18 mg/dL     7-23                      



             0221783523)                                         

 

             GLUCOSE (test code = 660 mg/dL           HH           



             6506102410)                                         

 

             CREATININE (test code = 0.64 mg/dL   0.60-1.25                 



             1949890080)                                         

 

             TOTAL BILI (test code = 1.0 mg/dL    0.1-1.1                   



             2707520592)                                         

 

             CALCIUM (test code = 9.7 mg/dL    8.6-10.6                  



             7152882034)                                         

 

             T PROTEIN (test code = 7.9 g/dL     6.3-8.2                   



             3928962818)                                         

 

             ALBUMIN (test code = 4.4 g/dL     3.5-5.0                   



             8416243355)                                         

 

             ALK PHOS (test code = 143 U/L             H            



             0977678086)                                         

 

             ALTv (test code = 47 U/L       5-50                      



             1742-6)                                             

 

             AST(SGOT) (test code = 30 U/L       13-40                     



             4959833606)                                         

 

             eGFR (test code =              mL/min/1.73m2              



             3427884528)                                         

 

             MAL (test code = MAL) Association of                           



                          Glomerular Filtration                           



                          Rate (GFR) and Staging                           



                          of Kidney Disease*                           



                          +---------------------                           



                          --+-------------------                           



                          --+-------------------                           



                          ------+| GFR                           



                          (mL/min/1.73 m2) ?|                           



                          With Kidney Damage ?|                           



                          ?Without Kidney                           



                          Damage+---------------                           



                          --------+-------------                           



                          --------+-------------                           



                          ------------+| ?>90 ?                           



                          ? ? ? ? ? ? ? ?|                           



                          ?Stage one ? ? ? ? ?|                           



                          ? Normal ? ? ? ? ? ? ?                           



                          ?+--------------------                           



                          ---+------------------                           



                          ---+------------------                           



                          -------+| ?60-89 ? ? ?                           



                          ? ? ? ? ?| ?Stage two                           



                          ? ? ? ? ?| ? Decreased                           



                          GFR ? ? ? ?                            



                          +---------------------                           



                          --+-------------------                           



                          --+-------------------                           



                          ------+| ?30-59 ? ? ?                           



                          ? ? ? ? ?| ?Stage                           



                          three ? ? ? ?| ? Stage                           



                          three ? ? ? ? ?                           



                          +---------------------                           



                          --+-------------------                           



                          --+-------------------                           



                          ------+| ?15-29 ? ? ?                           



                          ? ? ? ? ?| ?Stage four                           



                          ? ? ? ? | ? Stage four                           



                          ? ? ? ? ?                              



                          ?+--------------------                           



                          ---+------------------                           



                          ---+------------------                           



                          -------+| ?<15 (or                           



                          dialysis) ? ?| ?Stage                           



                          five ? ? ? ? | ? Stage                           



                          five ? ? ? ? ?                           



                          ?+--------------------                           



                          ---+------------------                           



                          ---+------------------                           



                          -------+ *Each stage                           



                          assumes the associated                           



                          GFR level has been in                           



                          effect for at least                           



                          three months. ?Stages                           



                          1 to 5, with or                           



                          without kidney                           



                          disease, indicate                           



                          chronic kidney                           



                          disease. Notes:                           



                          Determination of                           



                          stages one and two                           



                          (with eGFR                             



                          >59mL/min/1.73 m2)                           



                          requires estimation of                           



                          kidney damage for at                           



                          least three months as                           



                          defined by structural                           



                          or functional                           



                          abnormalities of the                           



                          kidney, manifested by                           



                          either:Pathological                           



                          abnormalities or                           



                          Markers of kidney                           



                          damage (including                           



                          abnormalities in the                           



                          composition of the                           



                          blood or urine or                           



                          abnormalities in                           



                          imaging tests).                           

 

             Lab Interpretation Abnormal                               



             (test code = 42748-2)                                        



[facility]





             Test Item    Value        Reference Range Interpretation Comments

 

             LIPASE (test code = 6230113649) 255 U/L      0-220        H        

    

 

             Lab Interpretation (test code = Abnormal                           

    



             45408-9)                                            



[id number] 00:38:14





             Test Item    Value        Reference Range Interpretation Comments

 

             WBC (test code =              See_Comment  H             [Automated



             6690-2)                                             message] The sy

stem



                                                                 which generated



                                                                 this result



                                                                 transmitted



                                                                 reference range

:



                                                                 4.20 - 10.70



                                                                 10*3/?L. The



                                                                 reference range

 was



                                                                 not used to



                                                                 interpret this



                                                                 result as



                                                                 normal/abnormal

.

 

             RBC (test code =              See_Comment  H             [Automated



             789-8)                                              message] The sy

stem



                                                                 which generated



                                                                 this result



                                                                 transmitted



                                                                 reference range

:



                                                                 4.26 - 5.52



                                                                 10*6/?L. The



                                                                 reference range

 was



                                                                 not used to



                                                                 interpret this



                                                                 result as



                                                                 normal/abnormal

.

 

             HGB (test code = 16.5 g/dL    12.2-16.4    H            



             718-7)                                              

 

             HCT (test code = 48.9 %       38.4-49.3                 



             4544-3)                                             

 

             MCV (test code = 87.2 fL      81.7-95.6                 



             787-2)                                              

 

             MCH (test code = 29.4 pg      26.1-32.7                 



             785-6)                                              

 

             MCHC (test code = 33.7 g/dL    31.2-35.0                 



             786-4)                                              

 

             RDW-SD (test code = 43.8 fL      38.5-51.6                 



             84359-1)                                            

 

             RDW-CV (test code = 13.6 %       12.1-15.4                 



             788-0)                                              

 

             PLT (test code =              See_Comment                [Automated



             777-3)                                              message] The sy

stem



                                                                 which generated



                                                                 this result



                                                                 transmitted



                                                                 reference range

:



                                                                 150 - 328 10*3/

?L.



                                                                 The reference r

zhen



                                                                 was not used to



                                                                 interpret this



                                                                 result as



                                                                 normal/abnormal

.

 

             MPV (test code = 11.4 fL      9.8-13.0                  



             59109-9)                                            

 

             NRBC/100 WBC (test              See_Comment                [Automat

ed



             code = 3386307956)                                        message] 

The system



                                                                 which generated



                                                                 this result



                                                                 transmitted



                                                                 reference range

:



                                                                 0.0 - 10.0 /100



                                                                 WBCs. The refer

ence



                                                                 range was not u

sed



                                                                 to interpret th

is



                                                                 result as



                                                                 normal/abnormal

.

 

             NRBC x10^3 (test code <0.01        See_Comment                [Auto

mated



             = 4657751087)                                        message] The s

ystem



                                                                 which generated



                                                                 this result



                                                                 transmitted



                                                                 reference range

:



                                                                 10*3/?L. The



                                                                 reference range

 was



                                                                 not used to



                                                                 interpret this



                                                                 result as



                                                                 normal/abnormal

.

 

             GRAN MAT (NEUT) % 76.0 %                                 



             (test code = 770-8)                                        

 

             IMM GRAN % (test code 0.50 %                                 



             = 8925702543)                                        

 

             LYMPH % (test code = 15.2 %                                 



             736-9)                                              

 

             MONO % (test code = 6.5 %                                  



             5905-5)                                             

 

             EOS % (test code = 1.6 %                                  



             713-8)                                              

 

             BASO % (test code = 0.2 %                                  



             706-2)                                              

 

             GRAN MAT x10^3(ANC) 8.55 10*3/uL 1.99-6.95    H            



             (test code =                                        



             4184503423)                                         

 

             IMM GRAN x10^3 (test 0.06 10*3/uL 0.00-0.06                 



             code = 9836868018)                                        

 

             LYMPH x10^3 (test code 1.71 10*3/uL 1.09-3.23                 



             = 731-0)                                            

 

             MONO x10^3 (test code 0.73 10*3/uL 0.36-1.02                 



             = 742-7)                                            

 

             EOS x10^3 (test code = 0.18 10*3/uL 0.06-0.53                 



             711-2)                                              

 

             BASO x10^3 (test code <0.03        0.01-0.09                 



             = 704-7)                                            

 

             Lab Interpretation Abnormal                               



             (test code = 94913-2)                                        



[facility]





             Test Item    Value        Reference Range Interpretation Comments

 

             MAGNESIUM (test code = 3936592342) 1.7 mg/dL    1.7-2.4            

       

 

             Lab Interpretation (test code = Normal                             

    



             89572-8)                                            



[facility], K, CL, CO2, 
GLUCOSE, BUN, CREATININE, CA)2022 18:32:38





             Test Item    Value        Reference Range Interpretation Comments

 

             NA (test code = 137 mmol/L   135-145                   



             2471443401)                                         

 

             K (test code = 4.3 mmol/L   3.5-5.0                   



             5664725617)                                         

 

             CL (test code = 105 mmol/L                       



             7845172139)                                         

 

             CO2 TOTAL (test code = 23 mmol/L    23-31                     



             3444074047)                                         

 

             AGAP (test code =              2-16                      



             6598758785)                                         

 

             BUN (test code = 17 mg/dL     7-23                      



             6766587303)                                         

 

             GLUCOSE (test code = 317 mg/dL           H            



             4119647362)                                         

 

             CREATININE (test code = 0.57 mg/dL   0.60-1.25    L            



             3158067570)                                         

 

             CALCIUM (test code = 9.3 mg/dL    8.6-10.6                  



             5454692685)                                         

 

             eGFR (test code =              mL/min/1.73m2              



             6234497048)                                         

 

             MAL (test code = MAL) Association of                           



                          Glomerular Filtration                           



                          Rate (GFR) and Staging                           



                          of Kidney Disease*                           



                          +---------------------                           



                          --+-------------------                           



                          --+-------------------                           



                          ------+| GFR                           



                          (mL/min/1.73 m2) ?|                           



                          With Kidney Damage ?|                           



                          ?Without Kidney                           



                          Damage+---------------                           



                          --------+-------------                           



                          --------+-------------                           



                          ------------+| ?>90 ?                           



                          ? ? ? ? ? ? ? ?|                           



                          ?Stage one ? ? ? ? ?|                           



                          ? Normal ? ? ? ? ? ? ?                           



                          ?+--------------------                           



                          ---+------------------                           



                          ---+------------------                           



                          -------+| ?60-89 ? ? ?                           



                          ? ? ? ? ?| ?Stage two                           



                          ? ? ? ? ?| ? Decreased                           



                          GFR ? ? ? ?                            



                          +---------------------                           



                          --+-------------------                           



                          --+-------------------                           



                          ------+| ?30-59 ? ? ?                           



                          ? ? ? ? ?| ?Stage                           



                          three ? ? ? ?| ? Stage                           



                          three ? ? ? ? ?                           



                          +---------------------                           



                          --+-------------------                           



                          --+-------------------                           



                          ------+| ?15-29 ? ? ?                           



                          ? ? ? ? ?| ?Stage four                           



                          ? ? ? ? | ? Stage four                           



                          ? ? ? ? ?                              



                          ?+--------------------                           



                          ---+------------------                           



                          ---+------------------                           



                          -------+| ?<15 (or                           



                          dialysis) ? ?| ?Stage                           



                          five ? ? ? ? | ? Stage                           



                          five ? ? ? ? ?                           



                          ?+--------------------                           



                          ---+------------------                           



                          ---+------------------                           



                          -------+ *Each stage                           



                          assumes the associated                           



                          GFR level has been in                           



                          effect for at least                           



                          three months. ?Stages                           



                          1 to 5, with or                           



                          without kidney                           



                          disease, indicate                           



                          chronic kidney                           



                          disease. Notes:                           



                          Determination of                           



                          stages one and two                           



                          (with eGFR                             



                          >59mL/min/1.73 m2)                           



                          requires estimation of                           



                          kidney damage for at                           



                          least three months as                           



                          defined by structural                           



                          or functional                           



                          abnormalities of the                           



                          kidney, manifested by                           



                          either:Pathological                           



                          abnormalities or                           



                          Markers of kidney                           



                          damage (including                           



                          abnormalities in the                           



                          composition of the                           



                          blood or urine or                           



                          abnormalities in                           



                          imaging tests).                           

 

             Lab Interpretation Abnormal                               



             (test code = 37637-7)                                        



[facility] WITH [id number] 18:10:18





             Test Item    Value        Reference Range Interpretation Comments

 

             WBC (test code =              See_Comment                [Automated

 message]



             6690-2)                                             The system [employer]



                                                                 generated this 

result



                                                                 transmitted ref

erence



                                                                 range: 4.20 - 1

0.70



                                                                 10*3/?L. The re

ference



                                                                 range was not u

sed to



                                                                 interpret this 

result



                                                                 as normal/abnor

mal.

 

             RBC (test code =              See_Comment                [Automated

 message]



             789-8)                                              The system [employer]



                                                                 generated this 

result



                                                                 transmitted ref

erence



                                                                 range: 4.26 - 5

.52



                                                                 10*6/?L. The re

ference



                                                                 range was not u

sed to



                                                                 interpret this 

result



                                                                 as normal/abnor

mal.

 

             HGB (test code = 16.0 g/dL    12.2-16.4                 



             718-7)                                              

 

             HCT (test code = 47.3 %       38.4-49.3                 



             4544-3)                                             

 

             MCV (test code = 87.1 fL      81.7-95.6                 



             787-2)                                              

 

             MCH (test code = 29.5 pg      26.1-32.7                 



             785-6)                                              

 

             MCHC (test code = 33.8 g/dL    31.2-35.0                 



             786-4)                                              

 

             RDW-SD (test code 44.4 fL      38.5-51.6                 



             = 26421-8)                                          

 

             RDW-CV (test code 13.8 %       12.1-15.4                 



             = 788-0)                                            

 

             PLT (test code =              See_Comment                [Automated

 message]



             777-3)                                              The system [employer]

h



                                                                 generated this 

result



                                                                 transmitted ref

erence



                                                                 range: 150 - 32

8



                                                                 10*3/?L. The re

ference



                                                                 range was not u

sed to



                                                                 interpret this 

result



                                                                 as normal/abnor

mal.

 

             MPV (test code = 10.9 fL      9.8-13.0                  



             87419-8)                                            

 

             NRBC/100 WBC (test              See_Comment                [Automat

ed message]



             code = 2543643038)                                        The syste

m which



                                                                 generated this 

result



                                                                 transmitted ref

erence



                                                                 range: 0.0 - 10

.0 /100



                                                                 WBCs. The refer

ence



                                                                 range was not u

sed to



                                                                 interpret this 

result



                                                                 as normal/abnor

mal.

 

             NRBC x10^3 (test <0.01        See_Comment                [Automated

 message]



             code = 7643821722)                                        The syste

m which



                                                                 generated this 

result



                                                                 transmitted ref

erence



                                                                 range: 10*3/?L.

 The



                                                                 reference range

 was not



                                                                 used to interpr

et this



                                                                 result as



                                                                 normal/abnormal

.

 

             GRAN MAT (NEUT) % 53.4 %                                 



             (test code =                                        



             770-8)                                              

 

             IMM GRAN % (test 0.30 %                                 



             code = 5146994637)                                        

 

             LYMPH % (test code 35.3 %                                 



             = 736-9)                                            

 

             MONO % (test code 7.4 %                                  



             = 5905-5)                                           

 

             EOS % (test code = 3.3 %                                  



             713-8)                                              

 

             BASO % (test code 0.3 %                                  



             = 706-2)                                            

 

             GRAN MAT     3.59 10*3/uL 1.99-6.95                 



             x10^3(ANC) (test                                        



             code = 9223154137)                                        

 

             IMM GRAN x10^3 <0.03        0.00-0.06                 



             (test code =                                        



             2852651180)                                         

 

             LYMPH x10^3 (test 2.37 10*3/uL 1.09-3.23                 



             code = 731-0)                                        

 

             MONO x10^3 (test 0.50 10*3/uL 0.36-1.02                 



             code = 742-7)                                        

 

             EOS x10^3 (test 0.22 10*3/uL 0.06-0.53                 



             code = 711-2)                                        

 

             BASO x10^3 (test <0.03        0.01-0.09                 



             code = 704-7)                                        



[facility] GLUCOSE (AUTOMATED)2022 16:50:23





             Test Item    Value        Reference Range Interpretation Comments

 

             POCT GLU (test code = 4493214680) 304 mg/dL           H      

      

 

             Lab Interpretation (test code = Abnormal                           

    



             36676-6)                                            



[facility] GLUCOSE (AUTOMATED)2022 12:58:13





             Test Item    Value        Reference Range Interpretation Comments

 

             POCT GLU (test code = 3878883135) 364 mg/dL           H      

      

 

             Lab Interpretation (test code = Abnormal                           

    



             65914-9)                                            



[facility] GLUCOSE (AUTOMATED)2022 09:42:05





             Test Item    Value        Reference Range Interpretation Comments

 

             POCT GLU (test code = 1453278448) 369 mg/dL           H      

      

 

             Lab Interpretation (test code = Abnormal                           

    



             68010-5)                                            



[facility] GLUCOSE (AUTOMATED)2022 05:09:33





             Test Item    Value        Reference Range Interpretation Comments

 

             POCT GLU (test code = 0736578151) 332 mg/dL           H      

      

 

             Lab Interpretation (test code = Abnormal                           

    



             29165-1)                                            



[facility] GLUCOSE (AUTOMATED)2022 01:27:31





             Test Item    Value        Reference Range Interpretation Comments

 

             POCT GLU (test code = 3433610594) 349 mg/dL           H      

      

 

             Lab Interpretation (test code = Abnormal                           

    



             25431-9)                                            



[facility] GLUCOSE (AUTOMATED)2022 21:42:26





             Test Item    Value        Reference Range Interpretation Comments

 

             POCT GLU (test code = 3176171045) 372 mg/dL           H      

      

 

             Lab Interpretation (test code = Abnormal                           

    



             47252-4)                                            



[facility] GLUCOSE (AUTOMATED)2022 17:17:16





             Test Item    Value        Reference Range Interpretation Comments

 

             POCT GLU (test code = 7842406632) 329 mg/dL           H      

      

 

             Lab Interpretation (test code = Abnormal                           

    



             55586-1)                                            



[facility] GLUCOSE (AUTOMATED)2022 14:09:34





             Test Item    Value        Reference Range Interpretation Comments

 

             POCT GLU (test code = 4771831284) 350 mg/dL           H      

      

 

             Lab Interpretation (test code = Abnormal                           

    



             30065-1)                                            



[facility] GLUCOSE (AUTOMATED)2022 09:59:20





             Test Item    Value        Reference Range Interpretation Comments

 

             POCT GLU (test code = 8003195984) 342 mg/dL           H      

      

 

             Lab Interpretation (test code = Abnormal                           

    



             59512-9)                                            



[facility] GLUCOSE (AUTOMATED)2022 01:46:05





             Test Item    Value        Reference Range Interpretation Comments

 

             POCT GLU (test code = 7108423469) 318 mg/dL           H      

      

 

             Lab Interpretation (test code = Abnormal                           

    



             23506-7)                                            



[facility] GLUCOSE (AUTOMATED)2022 21:25:33





             Test Item    Value        Reference Range Interpretation Comments

 

             POCT GLU (test code = 5685466643) 212 mg/dL           H      

      

 

             Lab Interpretation (test code = Abnormal                           

    



             90687-0)                                            



[facility] GLUCOSE (AUTOMATED)2022 16:27:52





             Test Item    Value        Reference Range Interpretation Comments

 

             POCT GLU (test code = 9646201012) 226 mg/dL           H      

      

 

             Lab Interpretation (test code = Abnormal                           

    



             12243-8)                                            



[facility] GLUCOSE (AUTOMATED)2022 15:37:46





             Test Item    Value        Reference Range Interpretation Comments

 

             POCT GLU (test code = 5301186954) 204 mg/dL           H      

      

 

             Lab Interpretation (test code = Abnormal                           

    



             53367-2)                                            



[facility] GLUCOSE (AUTOMATED)2022 14:39:25





             Test Item    Value        Reference Range Interpretation Comments

 

             POCT GLU (test code = 0127932794) 185 mg/dL           H      

      

 

             Lab Interpretation (test code = Abnormal                           

    



             43843-0)                                            



[facility] Metabolic Panel (Na, K, Cl, CO2, 
Glucose, BUN, Creatinine, Ca)2022 14:07:03





             Test Item    Value        Reference Range Interpretation Comments

 

             NA (test code = 137 mmol/L   135-145                   



             3161949530)                                         

 

             K (test code = 4.6 mmol/L   3.5-5.0                   



             7720970535)                                         

 

             CL (test code = 111 mmol/L          H            



             1368212297)                                         

 

             CO2 TOTAL (test code = 22 mmol/L    23-31        L            



             519851)                                         

 

             AGAP (test code =              2-16                      



             3080035644)                                         

 

             BUN (test code = 12 mg/dL     7-23                      



             2611167036)                                         

 

             GLUCOSE (test code = 181 mg/dL           H            



             4039741676)                                         

 

             CREATININE (test code = 0.70 mg/dL   0.60-1.25                 



             8799415828)                                         

 

             CALCIUM (test code = 8.3 mg/dL    8.6-10.6     L            



             5212156392)                                         

 

             eGFR (test code =              mL/min/1.73m2              



             0822576733)                                         

 

             MAL (test code = MAL) Association of                           



                          Glomerular Filtration                           



                          Rate (GFR) and Staging                           



                          of Kidney Disease*                           



                          +---------------------                           



                          --+-------------------                           



                          --+-------------------                           



                          ------+| GFR                           



                          (mL/min/1.73 m2) ?|                           



                          With Kidney Damage ?|                           



                          ?Without Kidney                           



                          Damage+---------------                           



                          --------+-------------                           



                          --------+-------------                           



                          ------------+| ?>90 ?                           



                          ? ? ? ? ? ? ? ?|                           



                          ?Stage one ? ? ? ? ?|                           



                          ? Normal ? ? ? ? ? ? ?                           



                          ?+--------------------                           



                          ---+------------------                           



                          ---+------------------                           



                          -------+| ?60-89 ? ? ?                           



                          ? ? ? ? ?| ?Stage two                           



                          ? ? ? ? ?| ? Decreased                           



                          GFR ? ? ? ?                            



                          +---------------------                           



                          --+-------------------                           



                          --+-------------------                           



                          ------+| ?30-59 ? ? ?                           



                          ? ? ? ? ?| ?Stage                           



                          three ? ? ? ?| ? Stage                           



                          three ? ? ? ? ?                           



                          +---------------------                           



                          --+-------------------                           



                          --+-------------------                           



                          ------+| ?15-29 ? ? ?                           



                          ? ? ? ? ?| ?Stage four                           



                          ? ? ? ? | ? Stage four                           



                          ? ? ? ? ?                              



                          ?+--------------------                           



                          ---+------------------                           



                          ---+------------------                           



                          -------+| ?<15 (or                           



                          dialysis) ? ?| ?Stage                           



                          five ? ? ? ? | ? Stage                           



                          five ? ? ? ? ?                           



                          ?+--------------------                           



                          ---+------------------                           



                          ---+------------------                           



                          -------+ *Each stage                           



                          assumes the associated                           



                          GFR level has been in                           



                          effect for at least                           



                          three months. ?Stages                           



                          1 to 5, with or                           



                          without kidney                           



                          disease, indicate                           



                          chronic kidney                           



                          disease. Notes:                           



                          Determination of                           



                          stages one and two                           



                          (with eGFR                             



                          >59mL/min/1.73 m2)                           



                          requires estimation of                           



                          kidney damage for at                           



                          least three months as                           



                          defined by structural                           



                          or functional                           



                          abnormalities of the                           



                          kidney, manifested by                           



                          either:Pathological                           



                          abnormalities or                           



                          Markers of kidney                           



                          damage (including                           



                          abnormalities in the                           



                          composition of the                           



                          blood or urine or                           



                          abnormalities in                           



                          imaging tests).                           

 

             Lab Interpretation Abnormal                               



             (test code = 28325-4)                                        



[facility] GLUCOSE (AUTOMATED)2022 13:45:48





             Test Item    Value        Reference Range Interpretation Comments

 

             POCT GLU (test code = 0647161539) 170 mg/dL           H      

      

 

             Lab Interpretation (test code = Abnormal                           

    



             95215-5)                                            



[facility] GLUCOSE (AUTOMATED)2022 12:28:12





             Test Item    Value        Reference Range Interpretation Comments

 

             POCT GLU (test code = 0568708568) 109 mg/dL                  

      

 

             Lab Interpretation (test code = Normal                             

    



             61646-8)                                            



[facility] GLUCOSE (AUTOMATED)2022 11:25:37





             Test Item    Value        Reference Range Interpretation Comments

 

             POCT GLU (test code = 4956341704) 134 mg/dL           H      

      

 

             Lab Interpretation (test code = Abnormal                           

    



             51112-4)                                            



[facility] (Na, K, Cl, CO2, 
Glucose, BUN, Creatinine, Ca)2022 10:50:17





             Test Item    Value        Reference Range Interpretation Comments

 

             NA (test code = 138 mmol/L   135-145                   



             2216010051)                                         

 

             K (test code = 5.1 mmol/L   3.5-5.0      H            Slight



             2932861432)                                         hemolysis

 

             CL (test code = 110 mmol/L          H            



             6731608555)                                         

 

             CO2 TOTAL (test code 23 mmol/L    23-31                     



             = 8272780160)                                        

 

             AGAP (test code =              2-16                      



             2505907946)                                         

 

             BUN (test code = 13 mg/dL     7-23                      Slight



             4326448062)                                         hemolysis

 

             GLUCOSE (test code = 131 mg/dL           H            



             9228132196)                                         

 

             CREATININE (test code 0.73 mg/dL   0.60-1.25                 



             = 2850889279)                                        

 

             CALCIUM (test code = 8.9 mg/dL    8.6-10.6                  



             3994499054)                                         

 

             eGFR (test code =              mL/min/1.73m2              



             8575887094)                                         

 

             MAL (test code = MAL) Association of                           



                          Glomerular                             



                          Filtration Rate                           



                          (GFR) and Staging                           



                          of Kidney Disease*                           



                          +------------------                           



                          -----+-------------                           



                          --------+----------                           



                          ---------------+|                           



                          GFR (mL/min/1.73                           



                          m2) ?| With Kidney                           



                          Damage ?| ?Without                           



                          Kidney                                 



                          Damage+------------                           



                          -----------+-------                           



                          --------------+----                           



                          -------------------                           



                          --+| ?>90 ? ? ? ? ?                           



                          ? ? ? ?| ?Stage one                           



                          ? ? ? ? ?| ? Normal                           



                          ? ? ? ? ? ? ?                           



                          ?+-----------------                           



                          ------+------------                           



                          ---------+---------                           



                          ----------------+|                           



                          ?60-89 ? ? ? ? ? ?                           



                          ? ?| ?Stage two ? ?                           



                          ? ? ?| ? Decreased                           



                          GFR ? ? ? ?                            



                          +------------------                           



                          -----+-------------                           



                          --------+----------                           



                          ---------------+|                           



                          ?30-59 ? ? ? ? ? ?                           



                          ? ?| ?Stage three ?                           



                          ? ? ?| ? Stage                           



                          three ? ? ? ? ?                           



                          +------------------                           



                          -----+-------------                           



                          --------+----------                           



                          ---------------+|                           



                          ?15-29 ? ? ? ? ? ?                           



                          ? ?| ?Stage four ?                           



                          ? ? ? | ? Stage                           



                          four ? ? ? ? ?                           



                          ?+-----------------                           



                          ------+------------                           



                          ---------+---------                           



                          ----------------+|                           



                          ?<15 (or dialysis)                           



                          ? ?| ?Stage five ?                           



                          ? ? ? | ? Stage                           



                          five ? ? ? ? ?                           



                          ?+-----------------                           



                          ------+------------                           



                          ---------+---------                           



                          ----------------+                           



                          *Each stage assumes                           



                          the associated GFR                           



                          level has been in                           



                          effect for at least                           



                          three months.                           



                          ?Stages 1 to 5,                           



                          with or without                           



                          kidney disease,                           



                          indicate chronic                           



                          kidney disease.                           



                          Notes:                                 



                          Determination of                           



                          stages one and two                           



                          (with eGFR                             



                          >59mL/min/1.73 m2)                           



                          requires estimation                           



                          of kidney damage                           



                          for at least three                           



                          months as defined                           



                          by structural or                           



                          functional                             



                          abnormalities of                           



                          the kidney,                            



                          manifested by                           



                          either:Pathological                           



                          abnormalities or                           



                          Markers of kidney                           



                          damage (including                           



                          abnormalities in                           



                          the composition of                           



                          the blood or urine                           



                          or abnormalities in                           



                          imaging tests).                           

 

             Lab Interpretation Abnormal                               



             (test code = 91647-3)                                        



[facility] GLUCOSE (AUTOMATED)2022 10:35:11





             Test Item    Value        Reference Range Interpretation Comments

 

             POCT GLU (test code = 1054780131) 125 mg/dL           H      

      

 

             Lab Interpretation (test code = Abnormal                           

    



             86677-8)                                            



[facility] GLUCOSE (AUTOMATED)2022 09:31:05





             Test Item    Value        Reference Range Interpretation Comments

 

             POCT GLU (test code = 6645196451) 137 mg/dL           H      

      

 

             Lab Interpretation (test code = Abnormal                           

    



             03727-2)                                            



[facility] GLUCOSE (AUTOMATED)2022 08:28:48





             Test Item    Value        Reference Range Interpretation Comments

 

             POCT GLU (test code = 2813273396) 168 mg/dL           H      

      

 

             Lab Interpretation (test code = Abnormal                           

    



             00721-0)                                            



[facility] GLUCOSE (AUTOMATED)2022 07:26:17





             Test Item    Value        Reference Range Interpretation Comments

 

             POCT GLU (test code = 0545646187) 165 mg/dL           H      

      

 

             Lab Interpretation (test code = Abnormal                           

    



             67516-4)                                            



[facility] Metabolic Panel (Na, K, Cl, CO2, 
Glucose, BUN, Creatinine, Ca)2022 07:18:59





             Test Item    Value        Reference Range Interpretation Comments

 

             NA (test code = 136 mmol/L   135-145                   



             5424500937)                                         

 

             K (test code = 5.1 mmol/L   3.5-5.0      H            



             4591795851)                                         

 

             CL (test code = 108 mmol/L                       



             2336943262)                                         

 

             CO2 TOTAL (test code = 24 mmol/L    23-31                     



             5309590199)                                         

 

             AGAP (test code =              2-16                      



             2218545692)                                         

 

             BUN (test code = 14 mg/dL     7-23                      



             0508778428)                                         

 

             GLUCOSE (test code = 202 mg/dL           H            



             0235739491)                                         

 

             CREATININE (test code = 0.79 mg/dL   0.60-1.25                 



             1885389884)                                         

 

             CALCIUM (test code = 8.9 mg/dL    8.6-10.6                  



             7324721037)                                         

 

             eGFR (test code =              mL/min/1.73m2              



             5294939498)                                         

 

             MAL (test code = MAL) Association of                           



                          Glomerular Filtration                           



                          Rate (GFR) and Staging                           



                          of Kidney Disease*                           



                          +---------------------                           



                          --+-------------------                           



                          --+-------------------                           



                          ------+| GFR                           



                          (mL/min/1.73 m2) ?|                           



                          With Kidney Damage ?|                           



                          ?Without Kidney                           



                          Damage+---------------                           



                          --------+-------------                           



                          --------+-------------                           



                          ------------+| ?>90 ?                           



                          ? ? ? ? ? ? ? ?|                           



                          ?Stage one ? ? ? ? ?|                           



                          ? Normal ? ? ? ? ? ? ?                           



                          ?+--------------------                           



                          ---+------------------                           



                          ---+------------------                           



                          -------+| ?60-89 ? ? ?                           



                          ? ? ? ? ?| ?Stage two                           



                          ? ? ? ? ?| ? Decreased                           



                          GFR ? ? ? ?                            



                          +---------------------                           



                          --+-------------------                           



                          --+-------------------                           



                          ------+| ?30-59 ? ? ?                           



                          ? ? ? ? ?| ?Stage                           



                          three ? ? ? ?| ? Stage                           



                          three ? ? ? ? ?                           



                          +---------------------                           



                          --+-------------------                           



                          --+-------------------                           



                          ------+| ?15-29 ? ? ?                           



                          ? ? ? ? ?| ?Stage four                           



                          ? ? ? ? | ? Stage four                           



                          ? ? ? ? ?                              



                          ?+--------------------                           



                          ---+------------------                           



                          ---+------------------                           



                          -------+| ?<15 (or                           



                          dialysis) ? ?| ?Stage                           



                          five ? ? ? ? | ? Stage                           



                          five ? ? ? ? ?                           



                          ?+--------------------                           



                          ---+------------------                           



                          ---+------------------                           



                          -------+ *Each stage                           



                          assumes the associated                           



                          GFR level has been in                           



                          effect for at least                           



                          three months. ?Stages                           



                          1 to 5, with or                           



                          without kidney                           



                          disease, indicate                           



                          chronic kidney                           



                          disease. Notes:                           



                          Determination of                           



                          stages one and two                           



                          (with eGFR                             



                          >59mL/min/1.73 m2)                           



                          requires estimation of                           



                          kidney damage for at                           



                          least three months as                           



                          defined by structural                           



                          or functional                           



                          abnormalities of the                           



                          kidney, manifested by                           



                          either:Pathological                           



                          abnormalities or                           



                          Markers of kidney                           



                          damage (including                           



                          abnormalities in the                           



                          composition of the                           



                          blood or urine or                           



                          abnormalities in                           



                          imaging tests).                           

 

             Lab Interpretation Abnormal                               



             (test code = 72402-5)                                        



[id number]57:08





             Test Item    Value        Reference Range Interpretation Comments

 

             BOH (test code = <0.1         mmol/L                    



             6046138561)                                         

 

             MAL (test code = Normal Ranges:  ? ?                           



             MAL)         Nonfasting ? ? ? ? ? Less                           



                          than 0.1 mmol/L ? ?                           



                          Overnight Fast ? ? ? Less                           



                          than 0.4 mmol/L ? ? Fasting                           



                          (1-2 weeks) ?6-8 mmol/L                           



                          Test developed and                           



                          characteristics determined                           



                          by [facility].                          

 



[facility] GLUCOSE (AUTOMATED)2022 06:24:07





             Test Item    Value        Reference Range Interpretation Comments

 

             POCT GLU (test code = 4921191612) 212 mg/dL           H      

      

 

             Lab Interpretation (test code = Abnormal                           

    



             65641-2)                                            



[facility] GLUCOSE (AUTOMATED)2022 05:28:19





             Test Item    Value        Reference Range Interpretation Comments

 

             POCT GLU (test code = 9140237477) 239 mg/dL           H      

      

 

             Lab Interpretation (test code = Abnormal                           

    



             50547-3)                                            



[facility] GLUCOSE (AUTOMATED)2022 04:29:31





             Test Item    Value        Reference Range Interpretation Comments

 

             POCT GLU (test code = 5518867843) 251 mg/dL           H      

      

 

             Lab Interpretation (test code = Abnormal                           

    



             85277-6)                                            



[facility] (Na, K, Cl, CO2, 
Glucose, BUN, Creatinine, Ca)2022 03:49:55





             Test Item    Value        Reference Range Interpretation Comments

 

             NA (test code = 141 mmol/L   135-145                   



             5468493781)                                         

 

             K (test code = 4.2 mmol/L   3.5-5.0                   



             9462864138)                                         

 

             CL (test code = 108 mmol/L                       



             3389155931)                                         

 

             CO2 TOTAL (test code = 26 mmol/L    23-31                     



             0397755015)                                         

 

             AGAP (test code =              2-16                      



             4246813799)                                         

 

             BUN (test code = 14 mg/dL     7-23                      



             7611749184)                                         

 

             GLUCOSE (test code = 164 mg/dL           H            



             9823974175)                                         

 

             CREATININE (test code = 0.84 mg/dL   0.60-1.25                 



             9424688107)                                         

 

             CALCIUM (test code = 9.3 mg/dL    8.6-10.6                  



             1436840105)                                         

 

             eGFR (test code =              mL/min/1.73m2              



             2577538718)                                         

 

             MAL (test code = MAL) Association of                           



                          Glomerular Filtration                           



                          Rate (GFR) and Staging                           



                          of Kidney Disease*                           



                          +---------------------                           



                          --+-------------------                           



                          --+-------------------                           



                          ------+| GFR                           



                          (mL/min/1.73 m2) ?|                           



                          With Kidney Damage ?|                           



                          ?Without Kidney                           



                          Damage+---------------                           



                          --------+-------------                           



                          --------+-------------                           



                          ------------+| ?>90 ?                           



                          ? ? ? ? ? ? ? ?|                           



                          ?Stage one ? ? ? ? ?|                           



                          ? Normal ? ? ? ? ? ? ?                           



                          ?+--------------------                           



                          ---+------------------                           



                          ---+------------------                           



                          -------+| ?60-89 ? ? ?                           



                          ? ? ? ? ?| ?Stage two                           



                          ? ? ? ? ?| ? Decreased                           



                          GFR ? ? ? ?                            



                          +---------------------                           



                          --+-------------------                           



                          --+-------------------                           



                          ------+| ?30-59 ? ? ?                           



                          ? ? ? ? ?| ?Stage                           



                          three ? ? ? ?| ? Stage                           



                          three ? ? ? ? ?                           



                          +---------------------                           



                          --+-------------------                           



                          --+-------------------                           



                          ------+| ?15-29 ? ? ?                           



                          ? ? ? ? ?| ?Stage four                           



                          ? ? ? ? | ? Stage four                           



                          ? ? ? ? ?                              



                          ?+--------------------                           



                          ---+------------------                           



                          ---+------------------                           



                          -------+| ?<15 (or                           



                          dialysis) ? ?| ?Stage                           



                          five ? ? ? ? | ? Stage                           



                          five ? ? ? ? ?                           



                          ?+--------------------                           



                          ---+------------------                           



                          ---+------------------                           



                          -------+ *Each stage                           



                          assumes the associated                           



                          GFR level has been in                           



                          effect for at least                           



                          three months. ?Stages                           



                          1 to 5, with or                           



                          without kidney                           



                          disease, indicate                           



                          chronic kidney                           



                          disease. Notes:                           



                          Determination of                           



                          stages one and two                           



                          (with eGFR                             



                          >59mL/min/1.73 m2)                           



                          requires estimation of                           



                          kidney damage for at                           



                          least three months as                           



                          defined by structural                           



                          or functional                           



                          abnormalities of the                           



                          kidney, manifested by                           



                          either:Pathological                           



                          abnormalities or                           



                          Markers of kidney                           



                          damage (including                           



                          abnormalities in the                           



                          composition of the                           



                          blood or urine or                           



                          abnormalities in                           



                          imaging tests).                           

 

             Lab Interpretation Abnormal                               



             (test code = 90268-3)                                        



[facility] GLUCOSE (AUTOMATED)2022 03:20:15





             Test Item    Value        Reference Range Interpretation Comments

 

             POCT GLU (test code = 0069554662) 174 mg/dL           H      

      

 

             Lab Interpretation (test code = Abnormal                           

    



             35799-8)                                            



[facility] GLUCOSE (AUTOMATED)2022 02:25:54





             Test Item    Value        Reference Range Interpretation Comments

 

             POCT GLU (test code = 7602430487) 158 mg/dL           H      

      

 

             Lab Interpretation (test code = Abnormal                           

    



             33605-2)                                            



[facility] GLUCOSE (AUTOMATED)2022 01:37:09





             Test Item    Value        Reference Range Interpretation Comments

 

             POCT GLU (test code = 6848133541) 219 mg/dL           H      

      

 

             Lab Interpretation (test code = Abnormal                           

    



             35069-2)                                            



[facility] GLUCOSE (AUTOMATED)2022 00:19:48





             Test Item    Value        Reference Range Interpretation Comments

 

             POCT GLU (test code = 8897841991) 345 mg/dL           H      

      

 

             Lab Interpretation (test code = Abnormal                           

    



             51385-2)                                            



[facility] GLUCOSE (AUTOMATED)2022 22:59:57





             Test Item    Value        Reference Range Interpretation Comments

 

             POCT GLU (test code = 0761495397) 318 mg/dL           H      

      

 

             Lab Interpretation (test code = Abnormal                           

    



             97205-7)                                            



[facility] Metabolic Panel (Na, K, Cl, CO2, 
Glucose, BUN, Creatinine, Ca)2022 22:41:00





             Test Item    Value        Reference Range Interpretation Comments

 

             NA (test code = 140 mmol/L   135-145                   



             3393929395)                                         

 

             K (test code = 3.1 mmol/L   3.5-5.0      L            



             2711908420)                                         

 

             CL (test code = 116 mmol/L          H            



             3722046967)                                         

 

             CO2 TOTAL (test code = 21 mmol/L    23-31        L            



             5273379225)                                         

 

             AGAP (test code =              2-16                      



             2425800260)                                         

 

             BUN (test code = 12 mg/dL     7-23                      



             8455513933)                                         

 

             GLUCOSE (test code = 281 mg/dL           H            



             7565537182)                                         

 

             CREATININE (test code = 0.62 mg/dL   0.60-1.25                 



             8829666586)                                         

 

             CALCIUM (test code = 7.2 mg/dL    8.6-10.6     L            



             3340112484)                                         

 

             eGFR (test code =              mL/min/1.73m2              



             3288154767)                                         

 

             MAL (test code = MAL) Association of                           



                          Glomerular Filtration                           



                          Rate (GFR) and Staging                           



                          of Kidney Disease*                           



                          +---------------------                           



                          --+-------------------                           



                          --+-------------------                           



                          ------+| GFR                           



                          (mL/min/1.73 m2) ?|                           



                          With Kidney Damage ?|                           



                          ?Without Kidney                           



                          Damage+---------------                           



                          --------+-------------                           



                          --------+-------------                           



                          ------------+| ?>90 ?                           



                          ? ? ? ? ? ? ? ?|                           



                          ?Stage one ? ? ? ? ?|                           



                          ? Normal ? ? ? ? ? ? ?                           



                          ?+--------------------                           



                          ---+------------------                           



                          ---+------------------                           



                          -------+| ?60-89 ? ? ?                           



                          ? ? ? ? ?| ?Stage two                           



                          ? ? ? ? ?| ? Decreased                           



                          GFR ? ? ? ?                            



                          +---------------------                           



                          --+-------------------                           



                          --+-------------------                           



                          ------+| ?30-59 ? ? ?                           



                          ? ? ? ? ?| ?Stage                           



                          three ? ? ? ?| ? Stage                           



                          three ? ? ? ? ?                           



                          +---------------------                           



                          --+-------------------                           



                          --+-------------------                           



                          ------+| ?15-29 ? ? ?                           



                          ? ? ? ? ?| ?Stage four                           



                          ? ? ? ? | ? Stage four                           



                          ? ? ? ? ?                              



                          ?+--------------------                           



                          ---+------------------                           



                          ---+------------------                           



                          -------+| ?<15 (or                           



                          dialysis) ? ?| ?Stage                           



                          five ? ? ? ? | ? Stage                           



                          five ? ? ? ? ?                           



                          ?+--------------------                           



                          ---+------------------                           



                          ---+------------------                           



                          -------+ *Each stage                           



                          assumes the associated                           



                          GFR level has been in                           



                          effect for at least                           



                          three months. ?Stages                           



                          1 to 5, with or                           



                          without kidney                           



                          disease, indicate                           



                          chronic kidney                           



                          disease. Notes:                           



                          Determination of                           



                          stages one and two                           



                          (with eGFR                             



                          >59mL/min/1.73 m2)                           



                          requires estimation of                           



                          kidney damage for at                           



                          least three months as                           



                          defined by structural                           



                          or functional                           



                          abnormalities of the                           



                          kidney, manifested by                           



                          either:Pathological                           



                          abnormalities or                           



                          Markers of kidney                           



                          damage (including                           



                          abnormalities in the                           



                          composition of the                           



                          blood or urine or                           



                          abnormalities in                           



                          imaging tests).                           

 

             Lab Interpretation Abnormal                               



             (test code = 82778-7)                                        



[facility] GLUCOSE (AUTOMATED)2022 21:53:29





             Test Item    Value        Reference Range Interpretation Comments

 

             POCT GLU (test code = 8529999343) 368 mg/dL           H      

      

 

             Lab Interpretation (test code = Abnormal                           

    



             26332-4)                                            



[facility] GLUCOSE (AUTOMATED)2022 20:41:51





             Test Item    Value        Reference Range Interpretation Comments

 

             POCT GLU (test code = 6665081044) 458 mg/dL           HH     

      

 

             Lab Interpretation (test code = Abnormal                           

    



             80078-0)                                            



[facility] GLUCOSE (AUTOMATED)2022 19:00:13





             Test Item    Value        Reference Range Interpretation Comments

 

             POCT GLU (test code = 1178658171) 369 mg/dL           H      

      

 

             Lab Interpretation (test code = Abnormal                           

    



             23151-5)                                            



[facility] (Na, K, Cl, CO2, 
Glucose, BUN, Creatinine, Ca)2022 18:32:29





             Test Item    Value        Reference Range Interpretation Comments

 

             NA (test code = 145 mmol/L   135-145                   



             6680498744)                                         

 

             K (test code = 4.8 mmol/L   3.5-5.0                   Slight



             2549372024)                                         hemolysis

 

             CL (test code = 112 mmol/L          H            



             1333398398)                                         

 

             CO2 TOTAL (test code 26 mmol/L    23-31                     



             = 5301707788)                                        

 

             AGAP (test code =              2-16                      



             7691344491)                                         

 

             BUN (test code = 16 mg/dL     7-23                      Slight



             8039857179)                                         hemolysis

 

             GLUCOSE (test code = 282 mg/dL           H            



             2937352852)                                         

 

             CREATININE (test code 0.83 mg/dL   0.60-1.25                 



             = 1150084161)                                        

 

             CALCIUM (test code = 10.0 mg/dL   8.6-10.6                  



             8248044189)                                         

 

             eGFR (test code =              mL/min/1.73m2              



             8646047269)                                         

 

             MAL (test code = MAL) Association of                           



                          Glomerular                             



                          Filtration Rate                           



                          (GFR) and Staging                           



                          of Kidney Disease*                           



                          +------------------                           



                          -----+-------------                           



                          --------+----------                           



                          ---------------+|                           



                          GFR (mL/min/1.73                           



                          m2) ?| With Kidney                           



                          Damage ?| ?Without                           



                          Kidney                                 



                          Damage+------------                           



                          -----------+-------                           



                          --------------+----                           



                          -------------------                           



                          --+| ?>90 ? ? ? ? ?                           



                          ? ? ? ?| ?Stage one                           



                          ? ? ? ? ?| ? Normal                           



                          ? ? ? ? ? ? ?                           



                          ?+-----------------                           



                          ------+------------                           



                          ---------+---------                           



                          ----------------+|                           



                          ?60-89 ? ? ? ? ? ?                           



                          ? ?| ?Stage two ? ?                           



                          ? ? ?| ? Decreased                           



                          GFR ? ? ? ?                            



                          +------------------                           



                          -----+-------------                           



                          --------+----------                           



                          ---------------+|                           



                          ?30-59 ? ? ? ? ? ?                           



                          ? ?| ?Stage three ?                           



                          ? ? ?| ? Stage                           



                          three ? ? ? ? ?                           



                          +------------------                           



                          -----+-------------                           



                          --------+----------                           



                          ---------------+|                           



                          ?15-29 ? ? ? ? ? ?                           



                          ? ?| ?Stage four ?                           



                          ? ? ? | ? Stage                           



                          four ? ? ? ? ?                           



                          ?+-----------------                           



                          ------+------------                           



                          ---------+---------                           



                          ----------------+|                           



                          ?<15 (or dialysis)                           



                          ? ?| ?Stage five ?                           



                          ? ? ? | ? Stage                           



                          five ? ? ? ? ?                           



                          ?+-----------------                           



                          ------+------------                           



                          ---------+---------                           



                          ----------------+                           



                          *Each stage assumes                           



                          the associated GFR                           



                          level has been in                           



                          effect for at least                           



                          three months.                           



                          ?Stages 1 to 5,                           



                          with or without                           



                          kidney disease,                           



                          indicate chronic                           



                          kidney disease.                           



                          Notes:                                 



                          Determination of                           



                          stages one and two                           



                          (with eGFR                             



                          >59mL/min/1.73 m2)                           



                          requires estimation                           



                          of kidney damage                           



                          for at least three                           



                          months as defined                           



                          by structural or                           



                          functional                             



                          abnormalities of                           



                          the kidney,                            



                          manifested by                           



                          either:Pathological                           



                          abnormalities or                           



                          Markers of kidney                           



                          damage (including                           



                          abnormalities in                           



                          the composition of                           



                          the blood or urine                           



                          or abnormalities in                           



                          imaging tests).                           

 

             Lab Interpretation Abnormal                               



             (test code = 19975-3)                                        



[facility] GLUCOSE (AUTOMATED)2022 17:45:09





             Test Item    Value        Reference Range Interpretation Comments

 

             POCT GLU (test code = 6119510538) 284 mg/dL           H      

      

 

             Lab Interpretation (test code = Abnormal                           

    



             28973-6)                                            



[facility]13





             Test Item    Value        Reference Range Interpretation Comments

 

             BOH (test code = 0.9 mmol/L                             



             4326410085)                                         

 

             MAL (test code = Normal Ranges:  ? ?                           



             MAL)         Nonfasting ? ? ? ? ? Less                           



                          than 0.1 mmol/L ? ?                           



                          Overnight Fast ? ? ? Less                           



                          than 0.4 mmol/L ? ? Fasting                           



                          (1-2 weeks) ?6-8 mmol/L                           



                          Test developed and                           



                          characteristics determined                           



                          by [facility].                          

 



[facility] GLUCOSE (AUTOMATED)2022 16:33:41





             Test Item    Value        Reference Range Interpretation Comments

 

             POCT GLU (test code = 0923948576) 266 mg/dL           H      

      

 

             Lab Interpretation (test code = Abnormal                           

    



             78314-8)                                            



[facility] GLUCOSE (AUTOMATED)2022 15:30:41





             Test Item    Value        Reference Range Interpretation Comments

 

             POCT GLU (test code = 1566559306) 294 mg/dL           H      

      

 

             Lab Interpretation (test code = Abnormal                           

    



             21013-5)                                            



[facility] GLUCOSE (AUTOMATED)2022 14:23:40





             Test Item    Value        Reference Range Interpretation Comments

 

             POCT GLU (test code = 0629478087) 511 mg/dL           HH     

      

 

             Lab Interpretation (test code = Abnormal                           

    



             21052-5)                                            



[id number] 13:55:53





             Test Item    Value        Reference Range Interpretation Comments

 

             WBC (test code = 6690-2)              See_Comment  H             [A

utomated message]



                                                                 The system [employer]



                                                                 generated this



                                                                 result transmit

huma



                                                                 reference range

:



                                                                 4.20 - 10.70



                                                                 10*3/?L. The



                                                                 reference range

 was



                                                                 not used to



                                                                 interpret this



                                                                 result as



                                                                 normal/abnormal

.

 

             RBC (test code = 789-8)              See_Comment  H             [Au

tomated message]



                                                                 The system "[employer]



                                                                 generated this



                                                                 result transmit

huma



                                                                 reference range

:



                                                                 4.26 - 5.52 10*

6/?L.



                                                                 The reference r

zhen



                                                                 was not used to



                                                                 interpret this



                                                                 result as



                                                                 normal/abnormal

.

 

             HGB (test code = 718-7) 16.2 g/dL    12.2-16.4                 

 

             HCT (test code = 4544-3) 48.4 %       38.4-49.3                 

 

             MCH (test code = 785-6) 29.1 pg      26.1-32.7                 

 

             MCV (test code = 787-2) 86.9 fL      81.7-95.6                 

 

             MCHC (test code = 786-4) 33.5 g/dL    31.2-35.0                 

 

             PLT (test code = 777-3)              See_Comment  H             [Au

tomated message]



                                                                 The system [facility]

[email or web address]



                                                                 generated this



                                                                 result transmit

huma



                                                                 reference range

: 150



                                                                 - 328 10*3/?L. 

The



                                                                 reference range

 was



                                                                 not used to



                                                                 interpret this



                                                                 result as



                                                                 normal/abnormal

.

 

             MPV (test code = 11.0 fL      9.8-13.0                  



             87613-8)                                            

 

             RDW-CV (test code = 14.2 %       12.1-15.4                 



             788-0)                                              

 

             RDW-SD (test code = 44.8 fL      38.5-51.6                 



             77572-2)                                            

 

             NRBC x10^3 (test code = <0.01        See_Comment                [Au

tomated message]



             9398125867)                                         The system [employer]



                                                                 generated this



                                                                 result transmit

huma



                                                                 reference range

:



                                                                 10*3/?L. The



                                                                 reference range

 was



                                                                 not used to



                                                                 interpret this



                                                                 result as



                                                                 normal/abnormal

.

 

             NRBC/100 WBC (test code              See_Comment                [Au

tomated message]



             = 7316144934)                                        The system [facility]



                                                                 generated this



                                                                 result transmit

huma



                                                                 reference range

: 0.0



                                                                 - 10.0 /100 WBC

s.



                                                                 The reference r

zhen



                                                                 was not used to



                                                                 interpret this



                                                                 result as



                                                                 normal/abnormal

.

 

             IPF % (test code =                                        



             9556654292)                                         

 

             Lab Interpretation (test Abnormal                               



             code = 63307-1)                                        



[facility] GLUCOSE (AUTOMATED)2022 13:34:21





             Test Item    Value        Reference Range Interpretation Comments

 

             POCT GLU (test code = 2205616404) 538 mg/dL           HH     

      

 

             Lab Interpretation (test code = Abnormal                           

    



             71255-5)                                            



[facility] GLUCOSE (AUTOMATED)2022 13:30:43





             Test Item    Value        Reference Range Interpretation Comments

 

             POCT GLU (test code = 4950686834) >600                HH     

      

 

             Lab Interpretation (test code = Abnormal                           

    



             89860-7)                                            



[facility] GLUCOSE (AUTOMATED)2022 13:30:43





             Test Item    Value        Reference Range Interpretation Comments

 

             POCT GLU (test code = 6924189261) >600                HH     

      

 

             Lab Interpretation (test code = Abnormal                           

    



             59717-7)                                            



[facility] GLUCOSE (AUTOMATED)2022 13:30:43





             Test Item    Value        Reference Range Interpretation Comments

 

             POCT GLU (test code = >600                HH           Notifi

ed Provider



             [id number])                                         

 

             Lab Interpretation (test Abnormal                               



             code = 28116-8)                                        



[facility] GLUCOSE (AUTOMATED)2022 13:30:43





             Test Item    Value        Reference Range Interpretation Comments

 

             POCT GLU (test code = 8956681558) >600                HH     

      

 

             Lab Interpretation (test code = Abnormal                           

    



             87364-3)                                            



[facility] GLUCOSE (AUTOMATED)2022 13:30:38





             Test Item    Value        Reference Range Interpretation Comments

 

             POCT GLU (test code = 9818330636) >600                HH     

      

 

             Lab Interpretation (test code = Abnormal                           

    



             25178-1)                                            



[facility] GLUCOSE (AUTOMATED)2022 13:30:38





             Test Item    Value        Reference Range Interpretation Comments

 

             POCT GLU (test code = 7029682716) >600                HH     

      

 

             Lab Interpretation (test code = Abnormal                           

    



             51374-9)                                            



[id number] 12:37:44





             Test Item    Value        Reference Range Interpretation Comments

 

             OSMOLALITY (test code =              See_Comment  HH            [Au

tomated message]



             2692-2)                                             The system [employer]



                                                                 generated this 

result



                                                                 transmitted ref

erence



                                                                 range: 278 - 30

5



                                                                 mOsm/kg. The



                                                                 reference range

 was



                                                                 not used to int

erpret



                                                                 this result as



                                                                 normal/abnormal

.

 

             Lab Interpretation (test Abnormal                               



             code = 30624-9)                                        



[facility] Metabolic Panel (Na, K, Cl, CO2, 
Glucose, BUN, Creatinine, Ca)2022 12:23:35





             Test Item    Value        Reference Range Interpretation Comments

 

             NA (test code = 145 mmol/L   135-145                   



             5511040426)                                         

 

             K (test code = 4.4 mmol/L   3.5-5.0                   



             9798552197)                                         

 

             CL (test code = 109 mmol/L          H            



             0705445520)                                         

 

             CO2 TOTAL (test code = 21 mmol/L    23-31        L            



             6859928798)                                         

 

             AGAP (test code =              2-16                      



             2531916484)                                         

 

             BUN (test code = 15 mg/dL     7-23                      



             7699660031)                                         

 

             GLUCOSE (test code = 753 mg/dL           HH           



             8752162400)                                         

 

             CREATININE (test code = 0.79 mg/dL   0.60-1.25                 



             8355324779)                                         

 

             CALCIUM (test code = 10.9 mg/dL   8.6-10.6     H            



             4015114016)                                         

 

             eGFR (test code =              mL/min/1.73m2              



             7706150821)                                         

 

             MAL (test code = MAL) Association of                           



                          Glomerular Filtration                           



                          Rate (GFR) and Staging                           



                          of Kidney Disease*                           



                          +---------------------                           



                          --+-------------------                           



                          --+-------------------                           



                          ------+| GFR                           



                          (mL/min/1.73 m2) ?|                           



                          With Kidney Damage ?|                           



                          ?Without Kidney                           



                          Damage+---------------                           



                          --------+-------------                           



                          --------+-------------                           



                          ------------+| ?>90 ?                           



                          ? ? ? ? ? ? ? ?|                           



                          ?Stage one ? ? ? ? ?|                           



                          ? Normal ? ? ? ? ? ? ?                           



                          ?+--------------------                           



                          ---+------------------                           



                          ---+------------------                           



                          -------+| ?60-89 ? ? ?                           



                          ? ? ? ? ?| ?Stage two                           



                          ? ? ? ? ?| ? Decreased                           



                          GFR ? ? ? ?                            



                          +---------------------                           



                          --+-------------------                           



                          --+-------------------                           



                          ------+| ?30-59 ? ? ?                           



                          ? ? ? ? ?| ?Stage                           



                          three ? ? ? ?| ? Stage                           



                          three ? ? ? ? ?                           



                          +---------------------                           



                          --+-------------------                           



                          --+-------------------                           



                          ------+| ?15-29 ? ? ?                           



                          ? ? ? ? ?| ?Stage four                           



                          ? ? ? ? | ? Stage four                           



                          ? ? ? ? ?                              



                          ?+--------------------                           



                          ---+------------------                           



                          ---+------------------                           



                          -------+| ?<15 (or                           



                          dialysis) ? ?| ?Stage                           



                          five ? ? ? ? | ? Stage                           



                          five ? ? ? ? ?                           



                          ?+--------------------                           



                          ---+------------------                           



                          ---+------------------                           



                          -------+ *Each stage                           



                          assumes the associated                           



                          GFR level has been in                           



                          effect for at least                           



                          three months. ?Stages                           



                          1 to 5, with or                           



                          without kidney                           



                          disease, indicate                           



                          chronic kidney                           



                          disease. Notes:                           



                          Determination of                           



                          stages one and two                           



                          (with eGFR                             



                          >59mL/min/1.73 m2)                           



                          requires estimation of                           



                          kidney damage for at                           



                          least three months as                           



                          defined by structural                           



                          or functional                           



                          abnormalities of the                           



                          kidney, manifested by                           



                          either:Pathological                           



                          abnormalities or                           



                          Markers of kidney                           



                          damage (including                           



                          abnormalities in the                           



                          composition of the                           



                          blood or urine or                           



                          abnormalities in                           



                          imaging tests).                           

 

             Lab Interpretation Abnormal                               



             (test code = 47679-4)                                        



[facility] 12:14:56





             Test Item    Value        Reference Range Interpretation Comments

 

             POCT GLU (test code = 9167617855) 564 mg/dL           HH     

      

 

             Lab Interpretation (test code = Abnormal                           

    



             69598-1)                                            



[facility] [id number] 12:05:40





             Test Item    Value        Reference Range Interpretation Comments

 

             MAGNESIUM (test code = 0839745797) 2.5 mg/dL    1.7-2.4      H     

       

 

             Lab Interpretation (test code = Abnormal                           

    



             37469-0)                                            



[facility] 12:05:20





             Test Item    Value        Reference Range Interpretation Comments

 

             MAGNESIUM (test code = 8921944246) 2.5 mg/dL    1.7-2.4      H     

       

 

             Lab Interpretation (test code = Abnormal                           

    



             64729-2)                                            



[facility]osph[facility] [id number] 12:05:20





             Test Item    Value        Reference Range Interpretation Comments

 

             PHOSPHORUS (test code = 3032297428) 6.2 mg/dL    2.5-5.0      H    

        

 

             Lab Interpretation (test code = Abnormal                           

    



             82611-7)                                            



[facility] + LACTIC [id number] 05:53:48





             Test Item    Value        Reference Range Interpretation Comments

 

             PH (test code =              7.32-7.42                 



             6447399372)                                         

 

             PCO2 MARCELINA (test code              See_Comment                [Automa

huma



             = 8093062644)                                        message] The



                                                                 system which



                                                                 generated this



                                                                 result



                                                                 transmitted



                                                                 reference range

:



                                                                 41 - 51 mmHg. T

he



                                                                 reference range



                                                                 was not used to



                                                                 interpret this



                                                                 result as



                                                                 normal/abnormal

.

 

             PO2 MARCELINA (test code =              See_Comment  HH            [Autom

ated



             6975127964)                                         message] The



                                                                 system which



                                                                 generated this



                                                                 result



                                                                 transmitted



                                                                 reference range

:



                                                                 25 - 40 mmHg. T

he



                                                                 reference range



                                                                 was not used to



                                                                 interpret this



                                                                 result as



                                                                 normal/abnormal

.

 

             HCO3 MARCELINA (test code              See_Comment  L             [Automa

huma



             = 7065632171)                                        message] The



                                                                 system which



                                                                 generated this



                                                                 result



                                                                 transmitted



                                                                 reference range

:



                                                                 24 - 28 mEq/L.



                                                                 The reference



                                                                 range was not



                                                                 used to interpr

et



                                                                 this result as



                                                                 normal/abnormal

.

 

             AC VBE(BEAKER) (test              mEq/L                     



             code = 2436843508)                                        

 

             THB MARCELINA (test code = 17.7 g/dL    13.5-18.0                 



             0508434193)                                         

 

             %O2HB MARCELINA (test code 93.8 %       52.0-63.0    H            



             = 9488883525)                                        

 

             %COHB MARCELINA (test code 2.1 %        0.0-1.5      H            



             = 2541845665)                                        

 

             %METHB MARCELINA (test 0.1 %        0.4-1.5      L            



             code = 7244334673)                                        

 

             VOL%O2 MARCELINA (test 23.3 %       6.0-12.0     H            



             code = 7306701268)                                        

 

             NA (test code = 142 mmol/L   135-145                   



             0986542522)                                         

 

             K+ (test code = 4.4 mmol/L   3.5-5.0                   



             9468017839)                                         

 

             AC CA IONZ (test 5.40 mg/dL   4.50-5.30    H            



             code = 8694363721)                                        

 

             GLUCOSE (test code = <20                 LL           



             5588416400)                                         

 

             LACTIC ACID (test 3.37 mmol/L  0.50-2.20    H            



             code = 3816786530)                                        

 

             MAL (test code = *ac gluc                               



             MAL)         unmeasurable                           

 

             Lab Interpretation Abnormal                               



             (test code =                                        



             17724-1)                                            



[id number] 
[facility]





             Test Item    Value        Reference Range Interpretation Comments

 

             NA (test code = 140 mmol/L   135-145                   



             6338801916)                                         

 

             K (test code = 4.8 mmol/L   3.5-5.0                   



             1981636770)                                         

 

             CL (test code = 100 mmol/L                       



             5811983478)                                         

 

             CO2 TOTAL (test code = 20 mmol/L    23-31        L            



             7069068371)                                         

 

             AGAP (test code =              2-16         H            



             9554544747)                                         

 

             BUN (test code = 14 mg/dL     7-23                      



             5275905958)                                         

 

             GLUCOSE (test code = 1083 mg/dL          HH           



             5196262921)                                         

 

             CREATININE (test code = 0.80 mg/dL   0.60-1.25                 



             9470233986)                                         

 

             TOTAL BILI (test code = 1.0 mg/dL    0.1-1.1                   



             6871199651)                                         

 

             CALCIUM (test code = 12.0 mg/dL   8.6-10.6     H            



             9772335009)                                         

 

             T PROTEIN (test code = 8.1 g/dL     6.3-8.2                   



             4727682933)                                         

 

             ALBUMIN (test code = 4.7 g/dL     3.5-5.0                   



             2409339274)                                         

 

             ALK PHOS (test code = 173 U/L             H            



             6321878083)                                         

 

             ALTv (test code = 36 U/L       5-50                      



             2-6)                                             

 

             AST(SGOT) (test code = 18 U/L       13-40                     



             9214528850)                                         

 

             eGFR (test code =              mL/min/1.73m2              



             3224665851)                                         

 

             MAL (test code = MAL) Association of                           



                          Glomerular Filtration                           



                          Rate (GFR) and Staging                           



                          of Kidney Disease*                           



                          +---------------------                           



                          --+-------------------                           



                          --+-------------------                           



                          ------+| GFR                           



                          (mL/min/1.73 m2) ?|                           



                          With Kidney Damage ?|                           



                          ?Without Kidney                           



                          Damage+---------------                           



                          --------+-------------                           



                          --------+-------------                           



                          ------------+| ?>90 ?                           



                          ? ? ? ? ? ? ? ?|                           



                          ?Stage one ? ? ? ? ?|                           



                          ? Normal ? ? ? ? ? ? ?                           



                          ?+--------------------                           



                          ---+------------------                           



                          ---+------------------                           



                          -------+| ?60-89 ? ? ?                           



                          ? ? ? ? ?| ?Stage two                           



                          ? ? ? ? ?| ? Decreased                           



                          GFR ? ? ? ?                            



                          +---------------------                           



                          --+-------------------                           



                          --+-------------------                           



                          ------+| ?30-59 ? ? ?                           



                          ? ? ? ? ?| ?Stage                           



                          three ? ? ? ?| ? Stage                           



                          three ? ? ? ? ?                           



                          +---------------------                           



                          --+-------------------                           



                          --+-------------------                           



                          ------+| ?15-29 ? ? ?                           



                          ? ? ? ? ?| ?Stage four                           



                          ? ? ? ? | ? Stage four                           



                          ? ? ? ? ?                              



                          ?+--------------------                           



                          ---+------------------                           



                          ---+------------------                           



                          -------+| ?<15 (or                           



                          dialysis) ? ?| ?Stage                           



                          five ? ? ? ? | ? Stage                           



                          five ? ? ? ? ?                           



                          ?+--------------------                           



                          ---+------------------                           



                          ---+------------------                           



                          -------+ *Each stage                           



                          assumes the associated                           



                          GFR level has been in                           



                          effect for at least                           



                          three months. ?Stages                           



                          1 to 5, with or                           



                          without kidney                           



                          disease, indicate                           



                          chronic kidney                           



                          disease. Notes:                           



                          Determination of                           



                          stages one and two                           



                          (with eGFR                             



                          >59mL/min/1.73 m2)                           



                          requires estimation of                           



                          kidney damage for at                           



                          least three months as                           



                          defined by structural                           



                          or functional                           



                          abnormalities of the                           



                          kidney, manifested by                           



                          either:Pathological                           



                          abnormalities or                           



                          Markers of kidney                           



                          damage (including                           



                          abnormalities in the                           



                          composition of the                           



                          blood or urine or                           



                          abnormalities in                           



                          imaging tests).                           

 

             Lab Interpretation Abnormal                               



             (test code = 10445-7)                                        



[id number]





             Test Item    Value        Reference    Interpretation Comments



                                       Range                     

 

             TROPONIN I (test <0.012       See_Comment                [Automated



             code = 9772120294)                                        message] 

The



                                                                 system which



                                                                 generated this



                                                                 result



                                                                 transmitted



                                                                 reference range

:



                                                                 <=0.034 ng/mL.



                                                                 The reference



                                                                 range was not



                                                                 used to



                                                                 interpret this



                                                                 result as



                                                                 normal/abnormal

.

 

             MAL (test code = Reference (Normal)                           



             MAL)         Range (defined by                           



                          the 99th percentile                           



                          reference limit): <=                           



                          0.034 ng/mL Note:                           



                          Cardiac troponin                           



                          begins to rise 3-4                           



                          hours after the                           



                          onset of ischemia.                           



                          Repeat in 4-6 hours                           



                          if the sample was                           



                          drawn within 3-4                           



                          hours of the onset                           



                          of the symptom and                           



                          found normal.                           



                          Diagnosis of                           



                          myocardial injury is                           



                          made with acute                           



                          changes in cTn                           



                          concentrations with                           



                          at least one serial                           



                          sample above the                           



                          99th percentile                           



                          upper reference                           



                          limit (URL), taken                           



                          together with the                           



                          patient's clinical                           



                          presentation.                           



                          Biotin has been                           



                          reported to cause a                           



                          negative bias,                           



                          interpret results                           



                          relative to                            



                          patient's use of                           



                          biotin.                                

 

             Lab Interpretation Normal                                 



             (test code =                                        



             77033-4)                                            



[id number] 03:48:23





             Test Item    Value        Reference Range Interpretation Comments

 

             NT-proBNP (test code 71 pg/mL     See_Comment                [Autom

ated



             = 2145071680)                                        message] The



                                                                 system which



                                                                 generated this



                                                                 result



                                                                 transmitted



                                                                 reference range

:



                                                                 <=125. The



                                                                 reference range



                                                                 was not used to



                                                                 interpret this



                                                                 result as



                                                                 normal/abnormal

.

 

             MAL (test code = MAL) Biotin has been                           



                          reported to                            



                          cause a negative                           



                          bias, interpret                           



                          results relative                           



                          to patient's use                           



                          of biotin.                             

 

             Lab Interpretation Normal                                 



             (test code = 40342-9)                                        



[facility]44





             Test Item    Value        Reference Range Interpretation Comments

 

             LIPASE (test code = 6500811329) 120 U/L      0-220                 

    

 

             Lab Interpretation (test code = Normal                             

    



             30737-9)                                            



[facility] [id number] 
03:38:23





             Test Item    Value        Reference Range Interpretation Comments

 

             APTT Patient (test              See_Comment                [Automat

ed



             code = 3173-2)                                        message] The



                                                                 system which



                                                                 generated this



                                                                 result



                                                                 transmitted



                                                                 reference range

:



                                                                 23 - 38 Seconds

.



                                                                 The reference



                                                                 range was not



                                                                 used to interpr

et



                                                                 this result as



                                                                 normal/abnormal

.

 

             MAL (test code = MAL) [facility] patient                           



                          population mean                           



                          normal value for                           



                          aPTT is 30                             



                          seconds.                               

 

             Lab Interpretation Normal                                 



             (test code = 62788-3)                                        



[facility] [id number] 03:36:22





             Test Item    Value        Reference Range Interpretation Comments

 

             PROTIME PATIENT (test              See_Comment                [Auto

mated message]



             code = 5964-2)                                        The system wh

ich



                                                                 generated this 

result



                                                                 transmitted ref

erence



                                                                 range: 12.0 - 1

4.7



                                                                 Seconds. The re

ference



                                                                 range was not u

sed to



                                                                 interpret this 

result



                                                                 as normal/abnor

mal.

 

             INR (test code = 6301-6)                                        Nor

mal INR <1.1;



                                                                 Warfarin Therap

eutic



                                                                 range 2.0 to 3.

0 or



                                                                 2.5 to 3.5, dep

ending



                                                                 upon the indica

tions.

 

             Lab Interpretation (test Normal                                 



             code = 00550-3)                                        



[facility] WITH [id number] 03:35:42





             Test Item    Value        Reference Range Interpretation Comments

 

             WBC (test code =              See_Comment  H             [Automated



             4690-2)                                             message] The



                                                                 system which



                                                                 generated this



                                                                 result transmit

huma



                                                                 reference range

:



                                                                 4.20 - 10.70



                                                                 10*3/?L. The



                                                                 reference range



                                                                 was not used to



                                                                 interpret this



                                                                 result as



                                                                 normal/abnormal

.

 

             RBC (test code =              See_Comment  H             [Automated



             789-8)                                              message] The



                                                                 system which



                                                                 generated this



                                                                 result transmit

huma



                                                                 reference range

:



                                                                 4.26 - 5.52



                                                                 10*6/?L. The



                                                                 reference range



                                                                 was not used to



                                                                 interpret this



                                                                 result as



                                                                 normal/abnormal

.

 

             HGB (test code = 17.8 g/dL    12.2-16.4    H            



             718-7)                                              

 

             HCT (test code = 51.7 %       38.4-49.3    H            



             4544-3)                                             

 

             MCV (test code = 86.9 fL      81.7-95.6                 



             787-2)                                              

 

             MCH (test code = 29.9 pg      26.1-32.7                 



             785-6)                                              

 

             MCHC (test code = 34.4 g/dL    31.2-35.0                 



             786-4)                                              

 

             RDW-SD (test code = 44.7 fL      38.5-51.6                 



             31396-2)                                            

 

             RDW-CV (test code = 14.2 %       12.1-15.4                 



             788-0)                                              

 

             PLT (test code =              See_Comment  H             [Automated



             777-3)                                              message] The



                                                                 system which



                                                                 generated this



                                                                 result transmit

huma



                                                                 reference range

:



                                                                 150 - 328 10*3/

?L.



                                                                 The reference



                                                                 range was not u

sed



                                                                 to interpret th

is



                                                                 result as



                                                                 normal/abnormal

.

 

             MPV (test code = 11.5 fL      9.8-13.0                  



             22272-3)                                            

 

             NRBC/100 WBC (test              See_Comment                [Automat

ed



             code = 1518791204)                                        message] 

The



                                                                 system which



                                                                 generated this



                                                                 result transmit

huma



                                                                 reference range

:



                                                                 0.0 - 10.0 /100



                                                                 WBCs. The



                                                                 reference range



                                                                 was not used to



                                                                 interpret this



                                                                 result as



                                                                 normal/abnormal

.

 

             NRBC x10^3 (test code <0.01        See_Comment                [Auto

mated



             = [id number])                                        message] The



                                                                 system which



                                                                 generated this



                                                                 result transmit

huma



                                                                 reference range

:



                                                                 10*3/?L. The



                                                                 reference range



                                                                 was not used to



                                                                 interpret this



                                                                 result as



                                                                 normal/abnormal

.

 

             GRAN MAT (NEUT) % 85.2 %                                 



             (test code = 770-8)                                        

 

             IMM GRAN % (test code 0.90 %                                 



             = [id number])                                        

 

             LYMPH % (test code = 5.9 %                                  



             736-9)                                              

 

             MONO % (test code = 7.8 %                                  



             5905-5)                                             

 

             EOS % (test code = 0.0 %                                  



             713-8)                                              

 

             BASO % (test code = 0.2 %                                  



             706-2)                                              

 

             GRAN MAT x10^3(ANC) 12.85 10*3/uL 1.99-6.95    H            



             (test code =                                        



             8276905022)                                         

 

             IMM GRAN x10^3 (test 0.13 10*3/uL 0.00-0.06    H            



             code = 5049816060)                                        

 

             LYMPH x10^3 (test code 0.89 10*3/uL 1.09-3.23    L            



             = 731-0)                                            

 

             MONO x10^3 (test code 1.17 10*3/uL 0.36-1.02    H            



             = 742-7)                                            

 

             EOS x10^3 (test code = <0.03        0.06-0.53    L            



             711-2)                                              

 

             BASO x10^3 (test code 0.03 10*3/uL 0.01-0.09                 



             = 704-7)                                            

 

             Lab Interpretation Abnormal                               



             (test code = 69537-6)                                        



[id number]23





             Test Item    Value        Reference Range Interpretation Comments

 

             LACTIC ACID (test code = 4.52 mmol/L  0.50-2.20    H            



             8581606532)                                         

 

             Lab Interpretation (test code = Abnormal                           

    



             57345-4)                                            



[facility], K, CL, CO2, 
GLUCOSE, BUN, CREATININE, CA)2022 11:13:09





             Test Item    Value        Reference Range Interpretation Comments

 

             NA (test code = 137 mmol/L   135-145                   



             7924191681)                                         

 

             K (test code = 4.8 mmol/L   3.5-5.0                   



             4728177709)                                         

 

             CL (test code = 101 mmol/L                       



             3535797864)                                         

 

             CO2 TOTAL (test code = 24 mmol/L    23-31                     



             4560915009)                                         

 

             AGAP (test code =              2-16                      



             2389380886)                                         

 

             BUN (test code = 17 mg/dL     7-23                      



             8672882180)                                         

 

             GLUCOSE (test code = 323 mg/dL           H            



             0667926057)                                         

 

             CREATININE (test code = 0.56 mg/dL   0.60-1.25    L            



             8337166919)                                         

 

             CALCIUM (test code = 9.6 mg/dL    8.6-10.6                  



             6452276536)                                         

 

             eGFR (test code =              mL/min/1.73m2              



             1802961512)                                         

 

             MAL (test code = MAL) Association of                           



                          Glomerular Filtration                           



                          Rate (GFR) and Staging                           



                          of Kidney Disease*                           



                          +---------------------                           



                          --+-------------------                           



                          --+-------------------                           



                          ------+| GFR                           



                          (mL/min/1.73 m2) ?|                           



                          With Kidney Damage ?|                           



                          ?Without Kidney                           



                          Damage+---------------                           



                          --------+-------------                           



                          --------+-------------                           



                          ------------+| ?>90 ?                           



                          ? ? ? ? ? ? ? ?|                           



                          ?Stage one ? ? ? ? ?|                           



                          ? Normal ? ? ? ? ? ? ?                           



                          ?+--------------------                           



                          ---+------------------                           



                          ---+------------------                           



                          -------+| ?60-89 ? ? ?                           



                          ? ? ? ? ?| ?Stage two                           



                          ? ? ? ? ?| ? Decreased                           



                          GFR ? ? ? ?                            



                          +---------------------                           



                          --+-------------------                           



                          --+-------------------                           



                          ------+| ?30-59 ? ? ?                           



                          ? ? ? ? ?| ?Stage                           



                          three ? ? ? ?| ? Stage                           



                          three ? ? ? ? ?                           



                          +---------------------                           



                          --+-------------------                           



                          --+-------------------                           



                          ------+| ?15-29 ? ? ?                           



                          ? ? ? ? ?| ?Stage four                           



                          ? ? ? ? | ? Stage four                           



                          ? ? ? ? ?                              



                          ?+--------------------                           



                          ---+------------------                           



                          ---+------------------                           



                          -------+| ?<15 (or                           



                          dialysis) ? ?| ?Stage                           



                          five ? ? ? ? | ? Stage                           



                          five ? ? ? ? ?                           



                          ?+--------------------                           



                          ---+------------------                           



                          ---+------------------                           



                          -------+ *Each stage                           



                          assumes the associated                           



                          GFR level has been in                           



                          effect for at least                           



                          three months. ?Stages                           



                          1 to 5, with or                           



                          without kidney                           



                          disease, indicate                           



                          chronic kidney                           



                          disease. Notes:                           



                          Determination of                           



                          stages one and two                           



                          (with eGFR                             



                          >59mL/min/1.73 m2)                           



                          requires estimation of                           



                          kidney damage for at                           



                          least three months as                           



                          defined by structural                           



                          or functional                           



                          abnormalities of the                           



                          kidney, manifested by                           



                          either:Pathological                           



                          abnormalities or                           



                          Markers of kidney                           



                          damage (including                           



                          abnormalities in the                           



                          composition of the                           



                          blood or urine or                           



                          abnormalities in                           



                          imaging tests).                           

 

             Lab Interpretation Abnormal                               



             (test code = 98873-9)                                        



[facility] WITH [id number] 10:49:07





             Test Item    Value        Reference Range Interpretation Comments

 

             WBC (test code =              See_Comment  H             [Automated



             6690-2)                                             message] The sy

stem



                                                                 which generated



                                                                 this result



                                                                 transmitted



                                                                 reference range

:



                                                                 4.20 - 10.70



                                                                 10*3/?L. The



                                                                 reference range

 was



                                                                 not used to



                                                                 interpret this



                                                                 result as



                                                                 normal/abnormal

.

 

             RBC (test code =              See_Comment                [Automated



             789-8)                                              message] The sy

stem



                                                                 which generated



                                                                 this result



                                                                 transmitted



                                                                 reference range

:



                                                                 4.26 - 5.52



                                                                 10*6/?L. The



                                                                 reference range

 was



                                                                 not used to



                                                                 interpret this



                                                                 result as



                                                                 normal/abnormal

.

 

             HGB (test code = 15.6 g/dL    12.2-16.4                 



             718-7)                                              

 

             HCT (test code = 46.6 %       38.4-49.3                 



             4544-3)                                             

 

             MCV (test code = 88.3 fL      81.7-95.6                 



             787-2)                                              

 

             MCH (test code = 29.5 pg      26.1-32.7                 



             785-6)                                              

 

             MCHC (test code = 33.5 g/dL    31.2-35.0                 



             786-4)                                              

 

             RDW-SD (test code = 46.7 fL      38.5-51.6                 



             45179-1)                                            

 

             RDW-CV (test code = 14.5 %       12.1-15.4                 



             788-0)                                              

 

             PLT (test code =              See_Comment  H             [Automated



             777-3)                                              message] The sy

stem



                                                                 which generated



                                                                 this result



                                                                 transmitted



                                                                 reference range

:



                                                                 150 - 328 10*3/

?L.



                                                                 The reference r

zhen



                                                                 was not used to



                                                                 interpret this



                                                                 result as



                                                                 normal/abnormal

.

 

             MPV (test code = 10.2 fL      9.8-13.0                  



             26011-9)                                            

 

             NRBC/100 WBC (test              See_Comment                [Automat

ed



             code = 0201082043)                                        message] 

The system



                                                                 which generated



                                                                 this result



                                                                 transmitted



                                                                 reference range

:



                                                                 0.0 - 10.0 /100



                                                                 WBCs. The refer

ence



                                                                 range was not u

sed



                                                                 to interpret th

is



                                                                 result as



                                                                 normal/abnormal

.

 

             NRBC x10^3 (test code <0.01        See_Comment                [Auto

mated



             = 2518505062)                                        message] The s

ystem



                                                                 which generated



                                                                 this result



                                                                 transmitted



                                                                 reference range

:



                                                                 10*3/?L. The



                                                                 reference range

 was



                                                                 not used to



                                                                 interpret this



                                                                 result as



                                                                 normal/abnormal

.

 

             GRAN MAT (NEUT) % 70.2 %                                 



             (test code = 770-8)                                        

 

             IMM GRAN % (test code 0.80 %                                 



             = 0802906404)                                        

 

             LYMPH % (test code = 18.0 %                                 



             736-9)                                              

 

             MONO % (test code = 8.5 %                                  



             5905-5)                                             

 

             EOS % (test code = 2.1 %                                  



             713-8)                                              

 

             BASO % (test code = 0.4 %                                  



             706-2)                                              

 

             GRAN MAT x10^3(ANC) 7.63 10*3/uL 1.99-6.95    H            



             (test code =                                        



             0345031208)                                         

 

             IMM GRAN x10^3 (test 0.09 10*3/uL 0.00-0.06    H            



             code = 5796325608)                                        

 

             LYMPH x10^3 (test code 1.96 10*3/uL 1.09-3.23                 



             = 731-0)                                            

 

             MONO x10^3 (test code 0.92 10*3/uL 0.36-1.02                 



             = 742-7)                                            

 

             EOS x10^3 (test code = 0.23 10*3/uL 0.06-0.53                 



             711-2)                                              

 

             BASO x10^3 (test code 0.04 10*3/uL 0.01-0.09                 



             = 704-7)                                            

 

             Lab Interpretation Abnormal                               



             (test code = 78870-3)                                        



[facility]NA, K, CL, CO2, 
GLUCOSE, BUN, CREATININE, CA)2022 09:48:58





             Test Item    Value        Reference Range Interpretation Comments

 

             NA (test code = 135 mmol/L   135-145                   



             0358620134)                                         

 

             K (test code = 4.7 mmol/L   3.5-5.0                   



             9210522090)                                         

 

             CL (test code = 101 mmol/L                       



             0300559591)                                         

 

             CO2 TOTAL (test code = 23 mmol/L    23-31                     



             9316460978)                                         

 

             AGAP (test code =              2-16                      



             0150732232)                                         

 

             BUN (test code = 18 mg/dL     7-23                      



             4608915551)                                         

 

             GLUCOSE (test code = 346 mg/dL           H            



             4774004235)                                         

 

             CREATININE (test code = 0.64 mg/dL   0.60-1.25                 



             4049554991)                                         

 

             CALCIUM (test code = 9.1 mg/dL    8.6-10.6                  



             6152014659)                                         

 

             eGFR (test code =              mL/min/1.73m2              



             8937875358)                                         

 

             MAL (test code = MAL) Association of                           



                          Glomerular Filtration                           



                          Rate (GFR) and Staging                           



                          of Kidney Disease*                           



                          +---------------------                           



                          --+-------------------                           



                          --+-------------------                           



                          ------+| GFR                           



                          (mL/min/1.73 m2) ?|                           



                          With Kidney Damage ?|                           



                          ?Without Kidney                           



                          Damage+---------------                           



                          --------+-------------                           



                          --------+-------------                           



                          ------------+| ?>90 ?                           



                          ? ? ? ? ? ? ? ?|                           



                          ?Stage one ? ? ? ? ?|                           



                          ? Normal ? ? ? ? ? ? ?                           



                          ?+--------------------                           



                          ---+------------------                           



                          ---+------------------                           



                          -------+| ?60-89 ? ? ?                           



                          ? ? ? ? ?| ?Stage two                           



                          ? ? ? ? ?| ? Decreased                           



                          GFR ? ? ? ?                            



                          +---------------------                           



                          --+-------------------                           



                          --+-------------------                           



                          ------+| ?30-59 ? ? ?                           



                          ? ? ? ? ?| ?Stage                           



                          three ? ? ? ?| ? Stage                           



                          three ? ? ? ? ?                           



                          +---------------------                           



                          --+-------------------                           



                          --+-------------------                           



                          ------+| ?15-29 ? ? ?                           



                          ? ? ? ? ?| ?Stage four                           



                          ? ? ? ? | ? Stage four                           



                          ? ? ? ? ?                              



                          ?+--------------------                           



                          ---+------------------                           



                          ---+------------------                           



                          -------+| ?<15 (or                           



                          dialysis) ? ?| ?Stage                           



                          five ? ? ? ? | ? Stage                           



                          five ? ? ? ? ?                           



                          ?+--------------------                           



                          ---+------------------                           



                          ---+------------------                           



                          -------+ *Each stage                           



                          assumes the associated                           



                          GFR level has been in                           



                          effect for at least                           



                          three months. ?Stages                           



                          1 to 5, with or                           



                          without kidney                           



                          disease, indicate                           



                          chronic kidney                           



                          disease. Notes:                           



                          Determination of                           



                          stages one and two                           



                          (with eGFR                             



                          >59mL/min/1.73 m2)                           



                          requires estimation of                           



                          kidney damage for at                           



                          least three months as                           



                          defined by structural                           



                          or functional                           



                          abnormalities of the                           



                          kidney, manifested by                           



                          either:Pathological                           



                          abnormalities or                           



                          Markers of kidney                           



                          damage (including                           



                          abnormalities in the                           



                          composition of the                           



                          blood or urine or                           



                          abnormalities in                           



                          imaging tests).                           

 

             Lab Interpretation Abnormal                               



             (test code = 12202-1)                                        



[facility] WITH [id number] 09:06:55





             Test Item    Value        Reference Range Interpretation Comments

 

             WBC (test code =              See_Comment                [Automated



             4096-2)                                             message] The sy

stem



                                                                 which generated



                                                                 this result



                                                                 transmitted



                                                                 reference range

:



                                                                 4.20 - 10.70



                                                                 10*3/?L. The



                                                                 reference range

 was



                                                                 not used to



                                                                 interpret this



                                                                 result as



                                                                 normal/abnormal

.

 

             RBC (test code =              See_Comment                [Automated



             882-8)                                              message] The sy

stem



                                                                 which generated



                                                                 this result



                                                                 transmitted



                                                                 reference range

:



                                                                 4.26 - 5.52



                                                                 10*6/?L. The



                                                                 reference range

 was



                                                                 not used to



                                                                 interpret this



                                                                 result as



                                                                 normal/abnormal

.

 

             HGB (test code = 15.0 g/dL    12.2-16.4                 



             718-7)                                              

 

             HCT (test code = 44.8 %       38.4-49.3                 



             4544-3)                                             

 

             MCV (test code = 88.2 fL      81.7-95.6                 



             787-2)                                              

 

             MCH (test code = 29.5 pg      26.1-32.7                 



             785-6)                                              

 

             MCHC (test code = 33.5 g/dL    31.2-35.0                 



             786-4)                                              

 

             RDW-SD (test code = 47.7 fL      38.5-51.6                 



             70602-5)                                            

 

             RDW-CV (test code = 14.6 %       12.1-15.4                 



             788-0)                                              

 

             PLT (test code =              See_Comment                [Automated



             777-3)                                              message] The sy

stem



                                                                 which generated



                                                                 this result



                                                                 transmitted



                                                                 reference range

:



                                                                 150 - 328 10*3/

?L.



                                                                 The reference r

zhen



                                                                 was not used to



                                                                 interpret this



                                                                 result as



                                                                 normal/abnormal

.

 

             MPV (test code = 9.9 fL       9.8-13.0                  



             27782-1)                                            

 

             NRBC/100 WBC (test              See_Comment                [Automat

ed



             code = 7534803973)                                        message] 

The system



                                                                 which generated



                                                                 this result



                                                                 transmitted



                                                                 reference range

:



                                                                 0.0 - 10.0 /100



                                                                 WBCs. The refer

ence



                                                                 range was not u

sed



                                                                 to interpret th

is



                                                                 result as



                                                                 normal/abnormal

.

 

             NRBC x10^3 (test code <0.01        See_Comment                [Auto

mated



             = [id number])                                        message] The s

ystem



                                                                 which generated



                                                                 this result



                                                                 transmitted



                                                                 reference range

:



                                                                 10*3/?L. The



                                                                 reference range

 was



                                                                 not used to



                                                                 interpret this



                                                                 result as



                                                                 normal/abnormal

.

 

             GRAN MAT (NEUT) % 62.6 %                                 



             (test code = 770-8)                                        

 

             IMM GRAN % (test code 1.30 %                                 



             = [id number])                                        

 

             LYMPH % (test code = 23.2 %                                 



             736-9)                                              

 

             MONO % (test code = 9.6 %                                  



             5905-5)                                             

 

             EOS % (test code = 2.9 %                                  



             713-8)                                              

 

             BASO % (test code = 0.4 %                                  



             706-2)                                              

 

             GRAN MAT x10^3(ANC) 5.85 10*3/uL 1.99-6.95                 



             (test code =                                        



             3832049224)                                         

 

             IMM GRAN x10^3 (test 0.12 10*3/uL 0.00-0.06    H            



             code = [id number])                                        

 

             LYMPH x10^3 (test code 2.17 10*3/uL 1.09-3.23                 



             = 731-0)                                            

 

             MONO x10^3 (test code 0.90 10*3/uL 0.36-1.02                 



             = 742-7)                                            

 

             EOS x10^3 (test code = 0.27 10*3/uL 0.06-0.53                 



             711-2)                                              

 

             BASO x10^3 (test code 0.04 10*3/uL 0.01-0.09                 



             = 704-7)                                            

 

             Lab Interpretation Abnormal                               



             (test code = 37067-5)                                        



[facility] CULTURE [id number] 02:01:46





             Test Item    Value        Reference Range Interpretation Comments

 

             Blood Culture-Aerobic No organisms No growth                 Previo

us



             (test code = 17928-3) isolated                               prelim

inary



                                                                 verified result



                                                                 was Culture In



                                                                 Progress on



                                                                 2022 at 00

01



                                                                 CDTPrevious



                                                                 preliminary



                                                                 verified result



                                                                 was No growth a

t



                                                                 24 hours on



                                                                 2022 at 21

01



                                                                 CDTPrevious



                                                                 preliminary



                                                                 verified result



                                                                 was No growth a

t



                                                                 48 hours on



                                                                 2022 at 21

01



                                                                 CDTPrevious



                                                                 preliminary



                                                                 verified result



                                                                 was No growth a

t



                                                                 72 hours on



                                                                 2022 at 21

01



                                                                 CDT

 

             Blood        No organisms No growth                 Previous



             Culture-Anaerobic isolated                               preliminar

y



             (test code = 49807-6)                                        verifi

ed result



                                                                 was Culture In



                                                                 Progress on



                                                                 2022 at 00

01



                                                                 CDTPrevious



                                                                 preliminary



                                                                 verified result



                                                                 was No growth a

t



                                                                 24 hours on



                                                                 2022 at 21

01



                                                                 CDTPrevious



                                                                 preliminary



                                                                 verified result



                                                                 was No growth a

t



                                                                 48 hours on



                                                                 2022 at 21

01



                                                                 CDTPrevious



                                                                 preliminary



                                                                 verified result



                                                                 was No growth a

t



                                                                 72 hours on



                                                                 2022 at 21

01



                                                                 CDT

 

             Lab Interpretation Normal                                 



             (test code = 70860-3)                                        



[facility] CULTURE [id number] 02:01:46





             Test Item    Value        Reference Range Interpretation Comments

 

             Blood Culture-Aerobic No organisms No growth                 Previo

us



             (test code = 17928-3) isolated                               prelim

inary



                                                                 verified result



                                                                 was Culture In



                                                                 Progress on



                                                                 2022 at 00

01



                                                                 CDTPrevious



                                                                 preliminary



                                                                 verified result



                                                                 was No growth a

t



                                                                 24 hours on



                                                                 2022 at 21

01



                                                                 CDTPrevious



                                                                 preliminary



                                                                 verified result



                                                                 was No growth a

t



                                                                 48 hours on



                                                                 2022 at 21

01



                                                                 CDTPrevious



                                                                 preliminary



                                                                 verified result



                                                                 was No growth a

t



                                                                 72 hours on



                                                                 2022 at 21

01



                                                                 CDT

 

             Blood        No organisms No growth                 Previous



             Culture-Anaerobic isolated                               preliminar

y



             (test code = 68921-9)                                        verifi

ed result



                                                                 was Culture In



                                                                 Progress on



                                                                 2022 at 00

01



                                                                 CDTPrevious



                                                                 preliminary



                                                                 verified result



                                                                 was No growth a

t



                                                                 24 hours on



                                                                 2022 at 21

01



                                                                 CDTPrevious



                                                                 preliminary



                                                                 verified result



                                                                 was No growth a

t



                                                                 48 hours on



                                                                 2022 at 21

01



                                                                 CDTPrevious



                                                                 preliminary



                                                                 verified result



                                                                 was No growth a

t



                                                                 72 hours on



                                                                 2022 at 21

01



                                                                 CDT

 

             Lab Interpretation Normal                                 



             (test code = 21339-8)                                        



[id number]





             Test Item    Value        Reference Range Interpretation Comments

 

             NT-proBNP (test code 117 pg/mL    See_Comment                [Autom

ated



             = [id number])                                        message] The



                                                                 system which



                                                                 generated this



                                                                 result



                                                                 transmitted



                                                                 reference range

:



                                                                 <=125. The



                                                                 reference range



                                                                 was not used to



                                                                 interpret this



                                                                 result as



                                                                 normal/abnormal

.

 

             MAL (test code = MAL) Biotin has been                           



                          reported to                            



                          cause a negative                           



                          bias, interpret                           



                          results relative                           



                          to patient's use                           



                          of biotin.                             

 

             Lab Interpretation Normal                                 



             (test code = 25122-2)                                        



[id number] 
12:10:25





             Test Item    Value        Reference Range Interpretation Comments

 

             NA (test code = 137 mmol/L   135-145                   



             6808808994)                                         

 

             K (test code = 4.6 mmol/L   3.5-5.0                   



             7204328985)                                         

 

             CL (test code = 108 mmol/L                       



             5292472793)                                         

 

             CO2 TOTAL (test code = 20 mmol/L    23-31        L            



             2100570522)                                         

 

             AGAP (test code =              2-16                      



             4865262988)                                         

 

             BUN (test code = 12 mg/dL     7-23                      



             6035495699)                                         

 

             GLUCOSE (test code = 186 mg/dL           H            



             5648667561)                                         

 

             CREATININE (test code = 0.49 mg/dL   0.60-1.25    L            



             6600335847)                                         

 

             TOTAL BILI (test code = 0.7 mg/dL    0.1-1.1                   



             7653016494)                                         

 

             CALCIUM (test code = 8.7 mg/dL    8.6-10.6                  



             3807712428)                                         

 

             T PROTEIN (test code = 6.9 g/dL     6.3-8.2                   



             9632206011)                                         

 

             ALBUMIN (test code = 3.7 g/dL     3.5-5.0                   



             7593244307)                                         

 

             ALK PHOS (test code = 114 U/L                          



             6182209840)                                         

 

             ALTv (test code = 75 U/L       5-50         H            



             -)                                             

 

             AST(SGOT) (test code = 27 U/L       13-40                     



             3535500567)                                         

 

             eGFR (test code =              mL/min/1.73m2              



             2528408807)                                         

 

             MAL (test code = MAL) Association of                           



                          Glomerular Filtration                           



                          Rate (GFR) and Staging                           



                          of Kidney Disease*                           



                          +---------------------                           



                          --+-------------------                           



                          --+-------------------                           



                          ------+| GFR                           



                          (mL/min/1.73 m2) ?|                           



                          With Kidney Damage ?|                           



                          ?Without Kidney                           



                          Damage+---------------                           



                          --------+-------------                           



                          --------+-------------                           



                          ------------+| ?>90 ?                           



                          ? ? ? ? ? ? ? ?|                           



                          ?Stage one ? ? ? ? ?|                           



                          ? Normal ? ? ? ? ? ? ?                           



                          ?+--------------------                           



                          ---+------------------                           



                          ---+------------------                           



                          -------+| ?60-89 ? ? ?                           



                          ? ? ? ? ?| ?Stage two                           



                          ? ? ? ? ?| ? Decreased                           



                          GFR ? ? ? ?                            



                          +---------------------                           



                          --+-------------------                           



                          --+-------------------                           



                          ------+| ?30-59 ? ? ?                           



                          ? ? ? ? ?| ?Stage                           



                          three ? ? ? ?| ? Stage                           



                          three ? ? ? ? ?                           



                          +---------------------                           



                          --+-------------------                           



                          --+-------------------                           



                          ------+| ?15-29 ? ? ?                           



                          ? ? ? ? ?| ?Stage four                           



                          ? ? ? ? | ? Stage four                           



                          ? ? ? ? ?                              



                          ?+--------------------                           



                          ---+------------------                           



                          ---+------------------                           



                          -------+| ?<15 (or                           



                          dialysis) ? ?| ?Stage                           



                          five ? ? ? ? | ? Stage                           



                          five ? ? ? ? ?                           



                          ?+--------------------                           



                          ---+------------------                           



                          ---+------------------                           



                          -------+ *Each stage                           



                          assumes the associated                           



                          GFR level has been in                           



                          effect for at least                           



                          three months. ?Stages                           



                          1 to 5, with or                           



                          without kidney                           



                          disease, indicate                           



                          chronic kidney                           



                          disease. Notes:                           



                          Determination of                           



                          stages one and two                           



                          (with eGFR                             



                          >59mL/min/1.73 m2)                           



                          requires estimation of                           



                          kidney damage for at                           



                          least three months as                           



                          defined by structural                           



                          or functional                           



                          abnormalities of the                           



                          kidney, manifested by                           



                          either:Pathological                           



                          abnormalities or                           



                          Markers of kidney                           



                          damage (including                           



                          abnormalities in the                           



                          composition of the                           



                          blood or urine or                           



                          abnormalities in                           



                          imaging tests).                           

 

             Lab Interpretation Abnormal                               



             (test code = 39863-9)                                        



[facility] WITH [id number] 11:14:25





             Test Item    Value        Reference Range Interpretation Comments

 

             WBC (test code =              See_Comment                [Automated



             6690-2)                                             message] The sy

stem



                                                                 which generated



                                                                 this result



                                                                 transmitted



                                                                 reference range

:



                                                                 4.20 - 10.70



                                                                 10*3/?L. The



                                                                 reference range

 was



                                                                 not used to



                                                                 interpret this



                                                                 result as



                                                                 normal/abnormal

.

 

             RBC (test code =              See_Comment                [Automated



             789-8)                                              message] The sy

stem



                                                                 which generated



                                                                 this result



                                                                 transmitted



                                                                 reference range

:



                                                                 4.26 - 5.52



                                                                 10*6/?L. The



                                                                 reference range

 was



                                                                 not used to



                                                                 interpret this



                                                                 result as



                                                                 normal/abnormal

.

 

             HGB (test code = 14.8 g/dL    12.2-16.4                 



             718-7)                                              

 

             HCT (test code = 44.8 %       38.4-49.3                 



             4544-3)                                             

 

             MCV (test code = 89.1 fL      81.7-95.6                 



             787-2)                                              

 

             MCH (test code = 29.4 pg      26.1-32.7                 



             785-6)                                              

 

             MCHC (test code = 33.0 g/dL    31.2-35.0                 



             786-4)                                              

 

             RDW-SD (test code = 48.7 fL      38.5-51.6                 



             04314-0)                                            

 

             RDW-CV (test code = 15.0 %       12.1-15.4                 



             788-0)                                              

 

             PLT (test code =              See_Comment                [Automated



             777-3)                                              message] The sy

stem



                                                                 which generated



                                                                 this result



                                                                 transmitted



                                                                 reference range

:



                                                                 150 - 328 10*3/

?L.



                                                                 The reference r

zhen



                                                                 was not used to



                                                                 interpret this



                                                                 result as



                                                                 normal/abnormal

.

 

             MPV (test code = 10.4 fL      9.8-13.0                  



             11233-9)                                            

 

             NRBC/100 WBC (test              See_Comment                [Automat

ed



             code = 3314656469)                                        message] 

The system



                                                                 which generated



                                                                 this result



                                                                 transmitted



                                                                 reference range

:



                                                                 0.0 - 10.0 /100



                                                                 WBCs. The refer

ence



                                                                 range was not u

sed



                                                                 to interpret th

is



                                                                 result as



                                                                 normal/abnormal

.

 

             NRBC x10^3 (test code <0.01        See_Comment                [Auto

mated



             = 5874996811)                                        message] The s

ystem



                                                                 which generated



                                                                 this result



                                                                 transmitted



                                                                 reference range

:



                                                                 10*3/?L. The



                                                                 reference range

 was



                                                                 not used to



                                                                 interpret this



                                                                 result as



                                                                 normal/abnormal

.

 

             GRAN MAT (NEUT) % 65.5 %                                 



             (test code = 770-8)                                        

 

             IMM GRAN % (test code 0.80 %                                 



             = 4099022521)                                        

 

             LYMPH % (test code = 20.9 %                                 



             736-9)                                              

 

             MONO % (test code = 9.7 %                                  



             5905-5)                                             

 

             EOS % (test code = 2.7 %                                  



             713-8)                                              

 

             BASO % (test code = 0.4 %                                  



             706-2)                                              

 

             GRAN MAT x10^3(ANC) 5.41 10*3/uL 1.99-6.95                 



             (test code =                                        



             0798539843)                                         

 

             IMM GRAN x10^3 (test 0.07 10*3/uL 0.00-0.06    H            



             code = 6484743918)                                        

 

             LYMPH x10^3 (test code 1.73 10*3/uL 1.09-3.23                 



             = 731-0)                                            

 

             MONO x10^3 (test code 0.80 10*3/uL 0.36-1.02                 



             = 742-7)                                            

 

             EOS x10^3 (test code = 0.22 10*3/uL 0.06-0.53                 



             711-2)                                              

 

             BASO x10^3 (test code 0.03 10*3/uL 0.01-0.09                 



             = 704-7)                                            

 

             Lab Interpretation Abnormal                               



             (test code = 97132-8)                                        



[id number]16:44





             Test Item    Value        Reference Range Interpretation Comments

 

             NT-proBNP (test code 157 pg/mL    See_Comment  H             [Autom

ated



             = 7070992060)                                        message] The



                                                                 system which



                                                                 generated this



                                                                 result



                                                                 transmitted



                                                                 reference range

:



                                                                 <=125. The



                                                                 reference range



                                                                 was not used to



                                                                 interpret this



                                                                 result as



                                                                 normal/abnormal

.

 

             MAL (test code = MAL) Biotin has been                           



                          reported to                            



                          cause a negative                           



                          bias, interpret                           



                          results relative                           



                          to patient's use                           



                          of biotin.                             

 

             Lab Interpretation Abnormal                               



             (test code = 19792-8)                                        



[facility][id number][facility] 
13:08:45





             Test Item    Value        Reference Range Interpretation Comments

 

             NA (test code = 141 mmol/L   135-145                   



             6215276853)                                         

 

             K (test code = 4.2 mmol/L   3.5-5.0                   



             1186933827)                                         

 

             CL (test code = 110 mmol/L          H            



             3429562143)                                         

 

             CO2 TOTAL (test code = 24 mmol/L    23-31                     



             4946455675)                                         

 

             AGAP (test code =              2-16                      



             8650470785)                                         

 

             BUN (test code = 11 mg/dL     7-23                      



             0690264951)                                         

 

             GLUCOSE (test code = 142 mg/dL           H            



             9677892892)                                         

 

             CREATININE (test code = 0.61 mg/dL   0.60-1.25                 



             1040849688)                                         

 

             TOTAL BILI (test code = 0.7 mg/dL    0.1-1.1                   



             7207068712)                                         

 

             CALCIUM (test code = 8.5 mg/dL    8.6-10.6     L            



             1065941938)                                         

 

             T PROTEIN (test code = 6.7 g/dL     6.3-8.2                   



             6048964529)                                         

 

             ALBUMIN (test code = 3.6 g/dL     3.5-5.0                   



             0520008019)                                         

 

             ALK PHOS (test code = 120 U/L                          



             5896286979)                                         

 

             ALTv (test code = 88 U/L       5-50         H            



             1742-6)                                             

 

             AST(SGOT) (test code = 27 U/L       13-40                     



             6966793103)                                         

 

             eGFR (test code =              mL/min/1.73m2              



             8677114056)                                         

 

             MAL (test code = MAL) Association of                           



                          Glomerular Filtration                           



                          Rate (GFR) and Staging                           



                          of Kidney Disease*                           



                          +---------------------                           



                          --+-------------------                           



                          --+-------------------                           



                          ------+| GFR                           



                          (mL/min/1.73 m2) ?|                           



                          With Kidney Damage ?|                           



                          ?Without Kidney                           



                          Damage+---------------                           



                          --------+-------------                           



                          --------+-------------                           



                          ------------+| ?>90 ?                           



                          ? ? ? ? ? ? ? ?|                           



                          ?Stage one ? ? ? ? ?|                           



                          ? Normal ? ? ? ? ? ? ?                           



                          ?+--------------------                           



                          ---+------------------                           



                          ---+------------------                           



                          -------+| ?60-89 ? ? ?                           



                          ? ? ? ? ?| ?Stage two                           



                          ? ? ? ? ?| ? Decreased                           



                          GFR ? ? ? ?                            



                          +---------------------                           



                          --+-------------------                           



                          --+-------------------                           



                          ------+| ?30-59 ? ? ?                           



                          ? ? ? ? ?| ?Stage                           



                          three ? ? ? ?| ? Stage                           



                          three ? ? ? ? ?                           



                          +---------------------                           



                          --+-------------------                           



                          --+-------------------                           



                          ------+| ?15-29 ? ? ?                           



                          ? ? ? ? ?| ?Stage four                           



                          ? ? ? ? | ? Stage four                           



                          ? ? ? ? ?                              



                          ?+--------------------                           



                          ---+------------------                           



                          ---+------------------                           



                          -------+| ?<15 (or                           



                          dialysis) ? ?| ?Stage                           



                          five ? ? ? ? | ? Stage                           



                          five ? ? ? ? ?                           



                          ?+--------------------                           



                          ---+------------------                           



                          ---+------------------                           



                          -------+ *Each stage                           



                          assumes the associated                           



                          GFR level has been in                           



                          effect for at least                           



                          three months. ?Stages                           



                          1 to 5, with or                           



                          without kidney                           



                          disease, indicate                           



                          chronic kidney                           



                          disease. Notes:                           



                          Determination of                           



                          stages one and two                           



                          (with eGFR                             



                          >59mL/min/1.73 m2)                           



                          requires estimation of                           



                          kidney damage for at                           



                          least three months as                           



                          defined by structural                           



                          or functional                           



                          abnormalities of the                           



                          kidney, manifested by                           



                          either:Pathological                           



                          abnormalities or                           



                          Markers of kidney                           



                          damage (including                           



                          abnormalities in the                           



                          composition of the                           



                          blood or urine or                           



                          abnormalities in                           



                          imaging tests).                           

 

             Lab Interpretation Abnormal                               



             (test code = 62745-4)                                        



[facility]





             Test Item    Value        Reference Range Interpretation Comments

 

             MAGNESIUM (test code = 1967573154) 1.7 mg/dL    1.7-2.4            

       

 

             Lab Interpretation (test code = Normal                             

    



             90580-5)                                            



[facility] WITH [id number] 11:57:56





             Test Item    Value        Reference Range Interpretation Comments

 

             WBC (test code =              See_Comment                [Automated



             3390-2)                                             message] The sy

stem



                                                                 which generated



                                                                 this result



                                                                 transmitted



                                                                 reference range

:



                                                                 4.20 - 10.70



                                                                 10*3/?L. The



                                                                 reference range

 was



                                                                 not used to



                                                                 interpret this



                                                                 result as



                                                                 normal/abnormal

.

 

             RBC (test code =              See_Comment                [Automated



             119-8)                                              message] The sy

stem



                                                                 which generated



                                                                 this result



                                                                 transmitted



                                                                 reference range

:



                                                                 4.26 - 5.52



                                                                 10*6/?L. The



                                                                 reference range

 was



                                                                 not used to



                                                                 interpret this



                                                                 result as



                                                                 normal/abnormal

.

 

             HGB (test code = 14.6 g/dL    12.2-16.4                 



             718-7)                                              

 

             HCT (test code = 43.5 %       38.4-49.3                 



             4544-3)                                             

 

             MCV (test code = 88.4 fL      81.7-95.6                 



             787-2)                                              

 

             MCH (test code = 29.7 pg      26.1-32.7                 



             785-6)                                              

 

             MCHC (test code = 33.6 g/dL    31.2-35.0                 



             786-4)                                              

 

             RDW-SD (test code = 48.9 fL      38.5-51.6                 



             66147-6)                                            

 

             RDW-CV (test code = 14.9 %       12.1-15.4                 



             788-0)                                              

 

             PLT (test code =              See_Comment                [Automated



             777-3)                                              message] The sy

stem



                                                                 which generated



                                                                 this result



                                                                 transmitted



                                                                 reference range

:



                                                                 150 - 328 10*3/

?L.



                                                                 The reference r

zhen



                                                                 was not used to



                                                                 interpret this



                                                                 result as



                                                                 normal/abnormal

.

 

             MPV (test code = 9.4 fL       9.8-13.0     L            



             85504-7)                                            

 

             NRBC/100 WBC (test              See_Comment                [Automat

ed



             code = [id number])                                        message] 

The system



                                                                 which generated



                                                                 this result



                                                                 transmitted



                                                                 reference range

:



                                                                 0.0 - 10.0 /100



                                                                 WBCs. The refer

ence



                                                                 range was not u

sed



                                                                 to interpret th

is



                                                                 result as



                                                                 normal/abnormal

.

 

             NRBC x10^3 (test code <0.01        See_Comment                [Auto

mated



             = [id number])                                        message] The s

ystem



                                                                 which generated



                                                                 this result



                                                                 transmitted



                                                                 reference range

:



                                                                 10*3/?L. The



                                                                 reference range

 was



                                                                 not used to



                                                                 interpret this



                                                                 result as



                                                                 normal/abnormal

.

 

             GRAN MAT (NEUT) % 63.9 %                                 



             (test code = 770-8)                                        

 

             IMM GRAN % (test code 0.70 %                                 



             = [id number])                                        

 

             LYMPH % (test code = 22.7 %                                 



             736-9)                                              

 

             MONO % (test code = 9.6 %                                  



             5905-5)                                             

 

             EOS % (test code = 2.7 %                                  



             713-8)                                              

 

             BASO % (test code = 0.4 %                                  



             706-2)                                              

 

             GRAN MAT x10^3(ANC) 4.75 10*3/uL 1.99-6.95                 



             (test code =                                        



             2761446669)                                         

 

             IMM GRAN x10^3 (test 0.05 10*3/uL 0.00-0.06                 



             code = 6212046831)                                        

 

             LYMPH x10^3 (test code 1.69 10*3/uL 1.09-3.23                 



             = 731-0)                                            

 

             MONO x10^3 (test code 0.71 10*3/uL 0.36-1.02                 



             = 742-7)                                            

 

             EOS x10^3 (test code = 0.20 10*3/uL 0.06-0.53                 



             711-2)                                              

 

             BASO x10^3 (test code 0.03 10*3/uL 0.01-0.09                 



             = 704-7)                                            

 

             Lab Interpretation Abnormal                               



             (test code = 48668-0)                                        



[facility] [id number] 19:18:39





             Test Item    Value        Reference Range Interpretation Comments

 

             VIT B12 (test code = 249 pg/mL    240-930                   



             [id number])                                         

 

             MAL (test code = MAL) Biotin has been                           



                          reported to cause a                           



                          positive bias,                           



                          interpret results                           



                          relative to                            



                          patient's use of                           



                          biotin.                                

 

             Lab Interpretation (test Normal                                 



             code = 46178-3)                                        



[facility] [id number] 17:30:28





             Test Item    Value        Reference Range Interpretation Comments

 

             VIT D 25OH (test code = 16 ng/mL     25-80        L            



             44007-3)                                            

 

             MAL (test code = MAL) Deficiency: <20                           



                          ng/mLInsufficiency:                           



                          20-24 ng/mLOptimal:                           



                          25-80 ng/mL                            

 

             Lab Interpretation (test Abnormal                               



             code = 76168-0)                                        



[facility]07:32





             Test Item    Value        Reference Range Interpretation Comments

 

             Procalcitonin (test 0.04 ng/mL   <0.07                     



             code = 4298873297)                                        

 

             MAL (test code = MAL) INTERPRETATION OF                           



                          PROCALCITONIN RESULTS IN                           



                          ADULTS >= 18 YEARS OF                           



                          AGE Initiation and                           



                          discontinuation of                           



                          antibiotics on patients                           



                          with suspected or                           



                          confirmed Lower                           



                          Respiratory Tract                           



                          Infection in Adults >=                           



                          18 years of age.                           



                          +--------------+--------                           



                          --------+---------------                           



                          +-----------------------                           



                          -----+|Procalcitonin                           



                          |Interpretation                           



                          ?|Antibiotic ? ?                           



                          |Considerations ? ? ? ?                           



                          ? ? ? |ng/mL ? ? ? ? | ?                           



                          ? ? ? ? ? ?                            



                          ?|recommendation | ? ? ?                           



                          ? ? ? ? ? ? ? ? ? ? ?                           



                          +--------------+--------                           



                          --------+---------------                           



                          +-----------------------                           



                          -----+| <0.1 ? ? ? ? |                           



                          Bacterial ? ? ?|                           



                          Strongly ? ? ?| ? ? ? ?                           



                          ? ? ? ? ? ? ? ? ? ? | ?                           



                          ? ? ? ? ? ?| infection                           



                          very | discouraged ? |                           



                          Overruling: ? ? ? ? ? ?                           



                          ? ? | ? ? ? ? ? ? ?|                           



                          unlikely ? ? ? | ? ? ? ?                           



                          ? ? ? | ? Clinically                           



                          unstable ? ? ?                           



                          +--------------+--------                           



                          --------+---------------                           



                          + ? High risk for                           



                          adverse ? ? | <0.25 ? ?                           



                          ? ?| Bacterial ? ? ?|                           



                          Discouraged ? | ?                           



                          outcome ? ? ? ? ? ? ? ?                           



                          ? | ? ? ? ? ? ? ?|                           



                          infection ? ? ?| ? ? ? ?                           



                          ? ? ? | ? SEE IMPORTANT                           



                          NOTE ? ? ? ?| ? ? ? ? ?                           



                          ? ?| unlikely ? ? ? | ?                           



                          ? ? ? ? ? ? | ? ? ? ? ?                           



                          ? ? ? ? ? ? ? ? ?                           



                          +--------------+--------                           



                          --------+---------------                           



                          +-----------------------                           



                          -----+| >=0.25 ? ? ? |                           



                          Bacterial ? ? ?|                           



                          Encouraged ? ?| ? ? ? ?                           



                          ? ? ? ? ? ? ? ? ? ? | ?                           



                          ? ? ? ? ? ?| infection ?                           



                          ? ?| ? ? ? ? ? ? ? | ? ?                           



                          ? ? ? ? ? ? ? ? ? ? ? ?                           



                          | ? ? ? ? ? ? ?| likely                           



                          ? ? ? ? | ? ? ? ? ? ? ?                           



                          | Consider treatment                           



                          failure                                



                          ?+--------------+-------                           



                          ---------+--------------                           



                          -+ if levels does not                           



                          decrease | >0.5 ? ? ? ?                           



                          | Bacterial ? ? ?|                           



                          Strongly ? ? ?|                           



                          appropriately ? ? ? ? ?                           



                          ? ? | ? ? ? ? ? ? ?|                           



                          infection very |                           



                          encouraged ? ?| ? ? ? ?                           



                          ? ? ? ? ? ? ? ? ? ? | ?                           



                          ? ? ? ? ? ?| likely ? ?                           



                          ? ? | ? ? ? ? ? ? ? | ?                           



                          ? ? ? ? ? ? ? ? ? ? ? ?                           



                          ?                                      



                          +--------------+--------                           



                          --------+---------------                           



                          +-----------------------                           



                          -----+ Discontinuation                           



                          of antibiotics in                           



                          high-acuity patients                           



                          with suspected or                           



                          confirmed sepsis in                           



                          Adults >= 18 years of                           



                          age.                                   



                          +--------------+--------                           



                          --------+---------------                           



                          +-----------------------                           



                          -----+|Procalcitonin                           



                          |Interpretation                           



                          ?|Antibiotic ? ?                           



                          |Considerations ? ? ? ?                           



                          ? ? ? |ng/mL ? ? ? ? | ?                           



                          ? ? ? ? ? ?                            



                          ?|recommendation | ? ? ?                           



                          ? ? ? ? ? ? ? ? ? ? ?                           



                          +--------------+--------                           



                          --------+---------------                           



                          +-----------------------                           



                          -----+| <0.25 ? ? ? ?|                           



                          Bacterial ? ? ?|                           



                          Strongly ? ? ?| ? ? ? ?                           



                          ? ? ? ? ? ? ? ? ? ? | ?                           



                          ? ? ? ? ? ?| infection                           



                          very | discouraged ? |                           



                          Overruling: ? ? ? ? ? ?                           



                          ? ? | ? ? ? ? ? ? ?|                           



                          unlikely ? ? ? | ? ? ? ?                           



                          ? ? ? | ? Clinically                           



                          unstable ? ? ?                           



                          +--------------+--------                           



                          --------+---------------                           



                          + ? High risk for                           



                          adverse ? ? | <0.5 or                           



                          drop | Bacterial ? ? ?|                           



                          Discouraged ? | ?                           



                          outcome ? ? ? ? ? ? ? ?                           



                          ? | >80% from ? ?|                           



                          infection ? ? ?| ? ? ? ?                           



                          ? ? ? | ? SEE IMPORTANT                           



                          NOTE ? ? ? ?| highest                           



                          PCT ?| unlikely ? ? ? |                           



                          ? ? ? ? ? ? ? | ? ? ? ?                           



                          ? ? ? ? ? ? ? ? ? ? |                           



                          level ? ? ? ?| ? ? ? ? ?                           



                          ? ? ?| ? ? ? ? ? ? ? | ?                           



                          ? ? ? ? ? ? ? ? ? ? ? ?                           



                          ?                                      



                          +--------------+--------                           



                          --------+---------------                           



                          +-----------------------                           



                          -----+| >=0.5 ? ? ? ?|                           



                          Bacterial ? ? ?|                           



                          Encouraged ? ?| ? ? ? ?                           



                          ? ? ? ? ? ? ? ? ? ? | ?                           



                          ? ? ? ? ? ?| infection ?                           



                          ? ?| ? ? ? ? ? ? ? | ? ?                           



                          ? ? ? ? ? ? ? ? ? ? ? ?                           



                          | ? ? ? ? ? ? ?| likely                           



                          ? ? ? ? | ? ? ? ? ? ? ?                           



                          | Consider treatment                           



                          failure                                



                          ?+--------------+-------                           



                          ---------+--------------                           



                          -+ if levels does not                           



                          decrease | >1.0 ? ? ? ?                           



                          | Bacterial ? ? ?|                           



                          Strongly ? ? ?|                           



                          appropriately ? ? ? ? ?                           



                          ? ? | ? ? ? ? ? ? ?|                           



                          infection very |                           



                          encouraged ? ?| ? ? ? ?                           



                          ? ? ? ? ? ? ? ? ? ? | ?                           



                          ? ? ? ? ? ?| likely ? ?                           



                          ? ? | ? ? ? ? ? ? ? | ?                           



                          ? ? ? ? ? ? ? ? ? ? ? ?                           



                          ?                                      



                          +--------------+--------                           



                          --------+---------------                           



                          +-----------------------                           



                          -----+ Percentage of                           



                          drop of Procalcitonin                           



                          calculation for                           



                          Discontinuation of                           



                          antibiotics in                           



                          high-acuity patients                           



                          with suspected or                           



                          confirmed sepsis in                           



                          Adults >= 18 years of                           



                          age.  ? ? ? ? ? ? ? ? ?                           



                          ? ? Procalcitonin                           



                          highest{}-Procalcitonin                           



                          current{}Delta                           



                          Procalcitonin =                           



                          ________________________                           



                          _______________________                           



                          x100%  ? ? ? ? ? ? ? ? ?                           



                          ? ? ? ? ? ? ?                           



                          Procalcitonin current {}                           



                          IMPORTANT NOTE:                           



                          Procalcitonin may be                           



                          elevated without                           



                          bacterial infection by                           



                          physiologic stress                           



                          related to trauma,                           



                          burns, chronic dialysis,                           



                          metastatic cancer,                           



                          surgery in the past                           



                          seven days, malaria,                           



                          some fungal infections,                           



                          and some forms of                           



                          vasculitis. The                           



                          interpretation algorithm                           



                          may not apply to                           



                          patients with                           



                          immunosuppression                           



                          (equivalent of >10 mg of                           



                          prednisone daily), HIV                           



                          with CD4 cell count <                           



                          350 cells/mm3, active                           



                          malignancy on systemic                           



                          chemotherapy, solid                           



                          organ transplant or                           



                          hematopoietic stem cell                           



                          transplantation, or                           



                          hospital acquired                           



                          pneumonia. Additionally,                           



                          some clinical trials of                           



                          procalcitonin have                           



                          excluded patients with                           



                          shock requiring                           



                          vasopressor use, acute                           



                          respiratory failure                           



                          requiring mechanical                           



                          ventilation, or those                           



                          with known lung                           



                          abscess/empyema. For                           



                          further information                           



                          please refer                           



                          to:[email or web address]                           



                          [email or web address]                           

 

             Lab Interpretation Normal                                 



             (test code = 08075-6)                                        



[id number]10





             Test Item    Value        Reference Range Interpretation Comments

 

             ESR (test code =              See_Comment  H             [Automated

 message]



             [id number])                                         The system [employer]



                                                                 generated this 

result



                                                                 transmitted ref

erence



                                                                 range: 0 - 10 m

m/HR.



                                                                 The reference r

zhen



                                                                 was not used to



                                                                 interpret this 

result



                                                                 as normal/abnor

mal.

 

             Lab Interpretation (test Abnormal                               



             code = 01593-4)                                        



[facility] 
13:12:27





             Test Item    Value        Reference Range Interpretation Comments

 

             HGB A1C (test code = 6.3 %        4.0-5.7      H            



             4548-4)                                             

 

             MAL (test code = MAL) Reference                              



                          RangesNormal:                           



                          <5.7%Prediabetes:                           



                          5.7 - 6.4%Diabetes:                           



                          > 6.5%                                 

 

             Lab Interpretation (test Abnormal                               



             code = 36073-5)                                        



[id number] 11:53:14





             Test Item    Value        Reference Range Interpretation Comments

 

             NT-proBNP (test code 16 pg/mL     See_Comment                [Autom

ated



             = [id number])                                        message] The



                                                                 system which



                                                                 generated this



                                                                 result



                                                                 transmitted



                                                                 reference range

:



                                                                 <=125. The



                                                                 reference range



                                                                 was not used to



                                                                 interpret this



                                                                 result as



                                                                 normal/abnormal

.

 

             MAL (test code = MAL) Biotin has been                           



                          reported to                            



                          cause a negative                           



                          bias, interpret                           



                          results relative                           



                          to patient's use                           



                          of biotin.                             

 

             Lab Interpretation Normal                                 



             (test code = 41951-6)                                        



[facility]. METABOLIC PANEL (60083)2022 
11:44:55





             Test Item    Value        Reference Range Interpretation Comments

 

             NA (test code = 142 mmol/L   135-145                   



             [id number])                                         

 

             K (test code = 3.9 mmol/L   3.5-5.0                   



             3661765273)                                         

 

             CL (test code = 108 mmol/L                       



             2341857118)                                         

 

             CO2 TOTAL (test code = 24 mmol/L    23-31                     



             4320385324)                                         

 

             AGAP (test code =              2-16                      



             5180440033)                                         

 

             BUN (test code = 11 mg/dL     7-23                      



             9028106515)                                         

 

             GLUCOSE (test code = 205 mg/dL           H            



             7421834423)                                         

 

             CREATININE (test code = 0.71 mg/dL   0.60-1.25                 



             4598495996)                                         

 

             TOTAL BILI (test code = 0.7 mg/dL    0.1-1.1                   



             2975216762)                                         

 

             CALCIUM (test code = 8.9 mg/dL    8.6-10.6                  



             1617362080)                                         

 

             T PROTEIN (test code = 7.3 g/dL     6.3-8.2                   



             0354310365)                                         

 

             ALBUMIN (test code = 3.8 g/dL     3.5-5.0                   



             0892826214)                                         

 

             ALK PHOS (test code = 135 U/L             H            



             2053112207)                                         

 

             ALTv (test code = 120 U/L      5-50         H            



             1742-6)                                             

 

             AST(SGOT) (test code = 33 U/L       13-40                     



             0383384351)                                         

 

             eGFR (test code =              mL/min/1.73m2              



             7322382155)                                         

 

             MAL (test code = MAL) Association of                           



                          Glomerular Filtration                           



                          Rate (GFR) and Staging                           



                          of Kidney Disease*                           



                          +---------------------                           



                          --+-------------------                           



                          --+-------------------                           



                          ------+| GFR                           



                          (mL/min/1.73 m2) ?|                           



                          With Kidney Damage ?|                           



                          ?Without Kidney                           



                          Damage+---------------                           



                          --------+-------------                           



                          --------+-------------                           



                          ------------+| ?>90 ?                           



                          ? ? ? ? ? ? ? ?|                           



                          ?Stage one ? ? ? ? ?|                           



                          ? Normal ? ? ? ? ? ? ?                           



                          ?+--------------------                           



                          ---+------------------                           



                          ---+------------------                           



                          -------+| ?60-89 ? ? ?                           



                          ? ? ? ? ?| ?Stage two                           



                          ? ? ? ? ?| ? Decreased                           



                          GFR ? ? ? ?                            



                          +---------------------                           



                          --+-------------------                           



                          --+-------------------                           



                          ------+| ?30-59 ? ? ?                           



                          ? ? ? ? ?| ?Stage                           



                          three ? ? ? ?| ? Stage                           



                          three ? ? ? ? ?                           



                          +---------------------                           



                          --+-------------------                           



                          --+-------------------                           



                          ------+| ?15-29 ? ? ?                           



                          ? ? ? ? ?| ?Stage four                           



                          ? ? ? ? | ? Stage four                           



                          ? ? ? ? ?                              



                          ?+--------------------                           



                          ---+------------------                           



                          ---+------------------                           



                          -------+| ?<15 (or                           



                          dialysis) ? ?| ?Stage                           



                          five ? ? ? ? | ? Stage                           



                          five ? ? ? ? ?                           



                          ?+--------------------                           



                          ---+------------------                           



                          ---+------------------                           



                          -------+ *Each stage                           



                          assumes the associated                           



                          GFR level has been in                           



                          effect for at least                           



                          three months. ?Stages                           



                          1 to 5, with or                           



                          without kidney                           



                          disease, indicate                           



                          chronic kidney                           



                          disease. Notes:                           



                          Determination of                           



                          stages one and two                           



                          (with eGFR                             



                          >59mL/min/1.73 m2)                           



                          requires estimation of                           



                          kidney damage for at                           



                          least three months as                           



                          defined by structural                           



                          or functional                           



                          abnormalities of the                           



                          kidney, manifested by                           



                          either:Pathological                           



                          abnormalities or                           



                          Markers of kidney                           



                          damage (including                           



                          abnormalities in the                           



                          composition of the                           



                          blood or urine or                           



                          abnormalities in                           



                          imaging tests).                           

 

             Lab Interpretation Abnormal                               



             (test code = 74846-3)                                        



[facility]55





             Test Item    Value        Reference Range Interpretation Comments

 

             MAGNESIUM (test code = 1956159379) 1.6 mg/dL    1.7-2.4      L     

       

 

             Lab Interpretation (test code = Abnormal                           

    



             57191-5)                                            



[facility] 11:44:35





             Test Item    Value        Reference Range Interpretation Comments

 

             PHOSPHORUS (test code = 5273720771) 4.1 mg/dL    2.5-5.0           

        

 

             Lab Interpretation (test code = Normal                             

    



             39909-3)                                            



[facility] [id number] 11:44:15





             Test Item    Value        Reference Range Interpretation Comments

 

             CK (test code = 5233078194) 50 U/L                           

 

             Lab Interpretation (test code = Normal                             

    



             87432-7)                                            



[facility] [id number] 11:28:14





             Test Item    Value        Reference Range Interpretation Comments

 

             WBC (test code =              See_Comment                [Automated



             1590-2)                                             message] The sy

stem



                                                                 which generated



                                                                 this result



                                                                 transmitted



                                                                 reference range

:



                                                                 4.20 - 10.70



                                                                 10*3/?L. The



                                                                 reference range

 was



                                                                 not used to



                                                                 interpret this



                                                                 result as



                                                                 normal/abnormal

.

 

             RBC (test code =              See_Comment                [Automated



             789-8)                                              message] The sy

stem



                                                                 which generated



                                                                 this result



                                                                 transmitted



                                                                 reference range

:



                                                                 4.26 - 5.52



                                                                 10*6/?L. The



                                                                 reference range

 was



                                                                 not used to



                                                                 interpret this



                                                                 result as



                                                                 normal/abnormal

.

 

             HGB (test code = 15.0 g/dL    12.2-16.4                 



             718-7)                                              

 

             HCT (test code = 44.6 %       38.4-49.3                 



             4544-3)                                             

 

             MCV (test code = 87.6 fL      81.7-95.6                 



             787-2)                                              

 

             MCH (test code = 29.5 pg      26.1-32.7                 



             785-6)                                              

 

             MCHC (test code = 33.6 g/dL    31.2-35.0                 



             786-4)                                              

 

             RDW-SD (test code = 48.4 fL      38.5-51.6                 



             07965-5)                                            

 

             RDW-CV (test code = 15.1 %       12.1-15.4                 



             788-0)                                              

 

             PLT (test code =              See_Comment                [Automated



             777-3)                                              message] The sy

stem



                                                                 which generated



                                                                 this result



                                                                 transmitted



                                                                 reference range

:



                                                                 150 - 328 10*3/

?L.



                                                                 The reference r

zhen



                                                                 was not used to



                                                                 interpret this



                                                                 result as



                                                                 normal/abnormal

.

 

             MPV (test code = 10.2 fL      9.8-13.0                  



             63933-9)                                            

 

             NRBC/100 WBC (test              See_Comment                [Automat

ed



             code = 2324631351)                                        message] 

The system



                                                                 which generated



                                                                 this result



                                                                 transmitted



                                                                 reference range

:



                                                                 0.0 - 10.0 /100



                                                                 WBCs. The refer

ence



                                                                 range was not u

sed



                                                                 to interpret th

is



                                                                 result as



                                                                 normal/abnormal

.

 

             NRBC x10^3 (test code <0.01        See_Comment                [Auto

mated



             = 5610162639)                                        message] The s

ystem



                                                                 which generated



                                                                 this result



                                                                 transmitted



                                                                 reference range

:



                                                                 10*3/?L. The



                                                                 reference range

 was



                                                                 not used to



                                                                 interpret this



                                                                 result as



                                                                 normal/abnormal

.

 

             GRAN MAT (NEUT) % 67.8 %                                 



             (test code = 770-8)                                        

 

             IMM GRAN % (test code 0.80 %                                 



             = 7177486635)                                        

 

             LYMPH % (test code = 19.8 %                                 



             736-9)                                              

 

             MONO % (test code = 8.7 %                                  



             5905-5)                                             

 

             EOS % (test code = 2.7 %                                  



             713-8)                                              

 

             BASO % (test code = 0.2 %                                  



             706-2)                                              

 

             GRAN MAT x10^3(ANC) 6.56 10*3/uL 1.99-6.95                 



             (test code =                                        



             7696681873)                                         

 

             IMM GRAN x10^3 (test 0.08 10*3/uL 0.00-0.06    H            



             code = 2416132144)                                        

 

             LYMPH x10^3 (test code 1.91 10*3/uL 1.09-3.23                 



             = 731-0)                                            

 

             MONO x10^3 (test code 0.84 10*3/uL 0.36-1.02                 



             = 742-7)                                            

 

             EOS x10^3 (test code = 0.26 10*3/uL 0.06-0.53                 



             711-2)                                              

 

             BASO x10^3 (test code <0.03        0.01-0.09                 



             = 704-7)                                            

 

             Lab Interpretation Abnormal                               



             (test code = 38739-1)                                        



[id number]





             Test Item    Value        Reference Range Interpretation Comments

 

             IRON (test code = 8829029264) 46 ug/dL            L          

  

 

             TIBC (test code = 0501975758) 299 ug/dL    250-410                 

  

 

             % FE SAT (test code = 0763343708) 15 %         20-50        L      

      

 

             Lab Interpretation (test code = Abnormal                           

    



             01815-7)                                            



[id number] 10:13:03





             Test Item    Value        Reference Range Interpretation Comments

 

             FERRITIN (test code = 89.2 ng/mL   18.0-464.0                



             1351732738)                                         

 

             MAL (test code = MAL) Biotin has been                           



                          reported to cause a                           



                          negative bias,                           



                          interpret results                           



                          relative to                            



                          patient's use of                           



                          biotin.                                

 

             Lab Interpretation (test Normal                                 



             code = 32050-8)                                        



[facility] [id number] 10:09:06





             Test Item    Value        Reference Range Interpretation Comments

 

             TSH (test code =              See_Comment                [Automated

 message]



             9089495639)                                         The system [employer]



                                                                 generated this 

result



                                                                 transmitted ref

erence



                                                                 range: 0.45 - 4

.70



                                                                 mIU/L. The refe

rence



                                                                 range was not u

sed to



                                                                 interpret this 

result



                                                                 as normal/abnor

mal.

 

             Lab Interpretation (test Normal                                 



             code = 44194-4)                                        



[id number] 09:47:44





             Test Item    Value        Reference Range Interpretation Comments

 

             NT-proBNP (test code 12 pg/mL     See_Comment                [Autom

ated



             = 7004409533)                                        message] The



                                                                 system which



                                                                 generated this



                                                                 result



                                                                 transmitted



                                                                 reference range

:



                                                                 <=125. The



                                                                 reference range



                                                                 was not used to



                                                                 interpret this



                                                                 result as



                                                                 normal/abnormal

.

 

             MAL (test code = MAL) Biotin has been                           



                          reported to                            



                          cause a negative                           



                          bias, interpret                           



                          results relative                           



                          to patient's use                           



                          of biotin.                             

 

             Lab Interpretation Normal                                 



             (test code = 20956-8)                                        



[facility] ([id number])(TOTAL CHOLESTEROL, 
TRIGLYCERIDES, HDL)2022 09:35:58





             Test Item    Value        Reference Range Interpretation Comments

 

             CHOL (test code = 250 mg/dL    120-200      H            



             0176775194)                                         

 

             HDL (test code = 34 mg/dL     >40          L            



             4135160485)                                         

 

             HDLC RATIO (test code =              See_Comment  H             [Au

tomated message]



             7160708721)                                         The system [employer]



                                                                 generated this



                                                                 result transmit

huma



                                                                 reference range

:



                                                                 <=5.0. The refe

rence



                                                                 range was not u

sed



                                                                 to interpret th

is



                                                                 result as



                                                                 normal/abnormal

.

 

             TRIG (test code = 394 mg/dL           H            



             5985051407)                                         

 

             LDL CHOL (test code = 137 mg/dL    See_Comment                [Auto

mated message]



             55259-6)                                            The system [employer]



                                                                 generated this



                                                                 result transmit

huma



                                                                 reference range

:



                                                                 <=160. The refe

rence



                                                                 range was not u

sed



                                                                 to interpret th

is



                                                                 result as



                                                                 normal/abnormal

.

 

             VLDL (test code = 79 mg/dL     5-60         H            



             5155467274)                                         

 

             Lab Interpretation (test Abnormal                               



             code = 32372-5)                                        



[facility] 09[facility]35:42





             Test Item    Value        Reference Range Interpretation Comments

 

             MAGNESIUM (test code = 4294012662) 1.5 mg/dL    1.7-2.4      L     

       

 

             Lab Interpretation (test code = Abnormal                           

    



             71240-2)                                            



[facility] 09:35:42





             Test Item    Value        Reference Range Interpretation Comments

 

             PHOSPHORUS (test code = 2180189740) 3.3 mg/dL    2.5-5.0           

        

 

             Lab Interpretation (test code = Normal                             

    



             20727-6)                                            



[facility]. METABOLIC PANEL ([id number])2022 
01:29:09





             Test Item    Value        Reference Range Interpretation Comments

 

             NA (test code = 142 mmol/L   135-145                   



             4411172694)                                         

 

             K (test code = 3.7 mmol/L   3.5-5.0                   



             6503362313)                                         

 

             CL (test code = 103 mmol/L                       



             7648686126)                                         

 

             CO2 TOTAL (test code = 27 mmol/L    23-31                     



             1688064979)                                         

 

             AGAP (test code =              2-16                      



             3881703847)                                         

 

             BUN (test code = 11 mg/dL     7-23                      



             6502521977)                                         

 

             GLUCOSE (test code = 166 mg/dL           H            



             9963290394)                                         

 

             CREATININE (test code = 0.61 mg/dL   0.60-1.25                 



             5808192479)                                         

 

             TOTAL BILI (test code = 0.8 mg/dL    0.1-1.1                   



             5111409978)                                         

 

             CALCIUM (test code = 9.3 mg/dL    8.6-10.6                  



             8361904499)                                         

 

             T PROTEIN (test code = 7.4 g/dL     6.3-8.2                   



             9545748314)                                         

 

             ALBUMIN (test code = 4.0 g/dL     3.5-5.0                   



             9970589588)                                         

 

             ALK PHOS (test code = 164 U/L             H            



             0493189662)                                         

 

             ALTv (test code = 149 U/L      5-50         H            



             1742-6)                                             

 

             AST(SGOT) (test code = 29 U/L       13-40                     



             7663180624)                                         

 

             eGFR (test code =              mL/min/1.73m2              



             7693766004)                                         

 

             MAL (test code = MAL) Association of                           



                          Glomerular Filtration                           



                          Rate (GFR) and Staging                           



                          of Kidney Disease*                           



                          +---------------------                           



                          --+-------------------                           



                          --+-------------------                           



                          ------+| GFR                           



                          (mL/min/1.73 m2) ?|                           



                          With Kidney Damage ?|                           



                          ?Without Kidney                           



                          Damage+---------------                           



                          --------+-------------                           



                          --------+-------------                           



                          ------------+| ?>90 ?                           



                          ? ? ? ? ? ? ? ?|                           



                          ?Stage one ? ? ? ? ?|                           



                          ? Normal ? ? ? ? ? ? ?                           



                          ?+--------------------                           



                          ---+------------------                           



                          ---+------------------                           



                          -------+| ?60-89 ? ? ?                           



                          ? ? ? ? ?| ?Stage two                           



                          ? ? ? ? ?| ? Decreased                           



                          GFR ? ? ? ?                            



                          +---------------------                           



                          --+-------------------                           



                          --+-------------------                           



                          ------+| ?30-59 ? ? ?                           



                          ? ? ? ? ?| ?Stage                           



                          three ? ? ? ?| ? Stage                           



                          three ? ? ? ? ?                           



                          +---------------------                           



                          --+-------------------                           



                          --+-------------------                           



                          ------+| ?15-29 ? ? ?                           



                          ? ? ? ? ?| ?Stage four                           



                          ? ? ? ? | ? Stage four                           



                          ? ? ? ? ?                              



                          ?+--------------------                           



                          ---+------------------                           



                          ---+------------------                           



                          -------+| ?<15 (or                           



                          dialysis) ? ?| ?Stage                           



                          five ? ? ? ? | ? Stage                           



                          five ? ? ? ? ?                           



                          ?+--------------------                           



                          ---+------------------                           



                          ---+------------------                           



                          -------+ *Each stage                           



                          assumes the associated                           



                          GFR level has been in                           



                          effect for at least                           



                          three months. ?Stages                           



                          1 to 5, with or                           



                          without kidney                           



                          disease, indicate                           



                          chronic kidney                           



                          disease. Notes:                           



                          Determination of                           



                          stages one and two                           



                          (with eGFR                             



                          >59mL/min/1.73 m2)                           



                          requires estimation of                           



                          kidney damage for at                           



                          least three months as                           



                          defined by structural                           



                          or functional                           



                          abnormalities of the                           



                          kidney, manifested by                           



                          either:Pathological                           



                          abnormalities or                           



                          Markers of kidney                           



                          damage (including                           



                          abnormalities in the                           



                          composition of the                           



                          blood or urine or                           



                          abnormalities in                           



                          imaging tests).                           

 

             Lab Interpretation Abnormal                               



             (test code = 95611-0)                                        



[facility]RM[facility] [id number] 
01:23:46





             Test Item    Value        Reference Range Interpretation Comments

 

             APTT Patient (test              See_Comment                [Automat

ed



             code = 3173-2)                                        message] The



                                                                 system which



                                                                 generated this



                                                                 result



                                                                 transmitted



                                                                 reference range

:



                                                                 23 - 38 Seconds

.



                                                                 The reference



                                                                 range was not



                                                                 used to interpr

et



                                                                 this result as



                                                                 normal/abnormal

.

 

             MAL (test code = MAL) [facility] patient                           



                          population mean                           



                          normal value for                           



                          aPTT is 30                             



                          seconds.                               

 

             Lab Interpretation Normal                                 



             (test code = 88344-9)                                        



[facility] / [id number] 01:21:51





             Test Item    Value        Reference Range Interpretation Comments

 

             PROTIME PATIENT (test              See_Comment                [Auto

mated message]



             code = 5964-2)                                        The system wh

ich



                                                                 generated this 

result



                                                                 transmitted ref

erence



                                                                 range: 12.0 - 1

4.7



                                                                 Seconds. The re

ference



                                                                 range was not u

sed to



                                                                 interpret this 

result



                                                                 as normal/abnor

mal.

 

             INR (test code = 6301-6)                                        Nor

mal INR <1.1;



                                                                 Warfarin Therap

eutic



                                                                 range 2.0 to 3.

0 or



                                                                 2.5 to 3.5, dep

ending



                                                                 upon the indica

tions.

 

             Lab Interpretation (test Normal                                 



             code = 44748-5)                                        



[id number]15[facility]28





             Test Item    Value        Reference Range Interpretation Comments

 

             WBC (test code =              See_Comment  H             [Automated



             6690-2)                                             message] The sy

stem



                                                                 which generated



                                                                 this result



                                                                 transmitted



                                                                 reference range

:



                                                                 4.20 - 10.70



                                                                 10*3/?L. The



                                                                 reference range

 was



                                                                 not used to



                                                                 interpret this



                                                                 result as



                                                                 normal/abnormal

.

 

             RBC (test code =              See_Comment  H             [Automated



             789-8)                                              message] The sy

stem



                                                                 which generated



                                                                 this result



                                                                 transmitted



                                                                 reference range

:



                                                                 4.26 - 5.52



                                                                 10*6/?L. The



                                                                 reference range

 was



                                                                 not used to



                                                                 interpret this



                                                                 result as



                                                                 normal/abnormal

.

 

             HGB (test code = 16.5 g/dL    12.2-16.4    H            



             718-7)                                              

 

             HCT (test code = 49.1 %       38.4-49.3                 



             4544-3)                                             

 

             MCV (test code = 87.7 fL      81.7-95.6                 



             787-2)                                              

 

             MCH (test code = 29.5 pg      26.1-32.7                 



             785-6)                                              

 

             MCHC (test code = 33.6 g/dL    31.2-35.0                 



             786-4)                                              

 

             RDW-SD (test code = 48.4 fL      38.5-51.6                 



             60534-1)                                            

 

             RDW-CV (test code = 15.1 %       12.1-15.4                 



             788-0)                                              

 

             PLT (test code =              See_Comment                [Automated



             777-3)                                              message] The sy

stem



                                                                 which generated



                                                                 this result



                                                                 transmitted



                                                                 reference range

:



                                                                 150 - 328 10*3/

?L.



                                                                 The reference r

zhen



                                                                 was not used to



                                                                 interpret this



                                                                 result as



                                                                 normal/abnormal

.

 

             MPV (test code = 10.1 fL      9.8-13.0                  



             38657-9)                                            

 

             NRBC/100 WBC (test              See_Comment                [Automat

ed



             code = 9490384149)                                        message] 

The system



                                                                 which generated



                                                                 this result



                                                                 transmitted



                                                                 reference range

:



                                                                 0.0 - 10.0 /100



                                                                 WBCs. The refer

ence



                                                                 range was not u

sed



                                                                 to interpret th

is



                                                                 result as



                                                                 normal/abnormal

.

 

             NRBC x10^3 (test code <0.01        See_Comment                [Auto

mated



             = [id number])                                        message] The s

ystem



                                                                 which generated



                                                                 this result



                                                                 transmitted



                                                                 reference range

:



                                                                 10*3/?L. The



                                                                 reference range

 was



                                                                 not used to



                                                                 interpret this



                                                                 result as



                                                                 normal/abnormal

.

 

             GRAN MAT (NEUT) % 72.7 %                                 



             (test code = 770-8)                                        

 

             IMM GRAN % (test code 0.60 %                                 



             = 3707552649)                                        

 

             LYMPH % (test code = 15.5 %                                 



             736-9)                                              

 

             MONO % (test code = 8.2 %                                  



             5905-5)                                             

 

             EOS % (test code = 2.6 %                                  



             713-8)                                              

 

             BASO % (test code = 0.4 %                                  



             706-2)                                              

 

             GRAN MAT x10^3(ANC) 7.83 10*3/uL 1.99-6.95    H            



             (test code =                                        



             2398359177)                                         

 

             IMM GRAN x10^3 (test 0.07 10*3/uL 0.00-0.06    H            



             code = 0187035251)                                        

 

             LYMPH x10^3 (test code 1.67 10*3/uL 1.09-3.23                 



             = 731-0)                                            

 

             MONO x10^3 (test code 0.88 10*3/uL 0.36-1.02                 



             = 742-7)                                            

 

             EOS x10^3 (test code = 0.28 10*3/uL 0.06-0.53                 



             711-2)                                              

 

             BASO x10^3 (test code 0.04 10*3/uL 0.01-0.09                 



             = 704-7)                                            

 

             Lab Interpretation Abnormal                               



             (test code = 06729-2)                                        



[facility] [id number] 01:14:32





             Test Item    Value        Reference Range Interpretation Comments

 

             LACTIC ACID (test code = 1.86 mmol/L  0.50-2.20                 



             7044776236)                                         

 

             Lab Interpretation (test code = Normal                             

    



             94849-1)                                            



[facility] WITH [id number] 04:47:32





             Test Item    Value        Reference Range Interpretation Comments

 

             WBC (test code =              See_Comment  H             [Automated



             0590-2)                                             message] The sy

stem



                                                                 which generated



                                                                 this result



                                                                 transmitted



                                                                 reference range

:



                                                                 4.20 - 10.70



                                                                 10*3/?L. The



                                                                 reference range

 was



                                                                 not used to



                                                                 interpret this



                                                                 result as



                                                                 normal/abnormal

.

 

             RBC (test code =              See_Comment                [Automated



             659-8)                                              message] The sy

stem



                                                                 which generated



                                                                 this result



                                                                 transmitted



                                                                 reference range

:



                                                                 4.26 - 5.52



                                                                 10*6/?L. The



                                                                 reference range

 was



                                                                 not used to



                                                                 interpret this



                                                                 result as



                                                                 normal/abnormal

.

 

             HGB (test code = 16.1 g/dL    12.2-16.4                 



             718-7)                                              

 

             HCT (test code = 47.2 %       38.4-49.3                 



             4544-3)                                             

 

             MCV (test code = 86.1 fL      81.7-95.6                 



             787-2)                                              

 

             MCH (test code = 29.4 pg      26.1-32.7                 



             785-6)                                              

 

             MCHC (test code = 34.1 g/dL    31.2-35.0                 



             786-4)                                              

 

             RDW-SD (test code = 45.7 fL      38.5-51.6                 



             36341-0)                                            

 

             RDW-CV (test code = 14.6 %       12.1-15.4                 



             788-0)                                              

 

             PLT (test code =              See_Comment                [Automated



             777-3)                                              message] The sy

stem



                                                                 which generated



                                                                 this result



                                                                 transmitted



                                                                 reference range

:



                                                                 150 - 328 10*3/

?L.



                                                                 The reference r

zhen



                                                                 was not used to



                                                                 interpret this



                                                                 result as



                                                                 normal/abnormal

.

 

             MPV (test code = 11.0 fL      9.8-13.0                  



             12360-0)                                            

 

             NRBC/100 WBC (test              See_Comment                [Automat

ed



             code = 5395107958)                                        message] 

The system



                                                                 which generated



                                                                 this result



                                                                 transmitted



                                                                 reference range

:



                                                                 0.0 - 10.0 /100



                                                                 WBCs. The refer

ence



                                                                 range was not u

sed



                                                                 to interpret th

is



                                                                 result as



                                                                 normal/abnormal

.

 

             NRBC x10^3 (test code <0.01        See_Comment                [Auto

mated



             = 2557140768)                                        message] The s

ystem



                                                                 which generated



                                                                 this result



                                                                 transmitted



                                                                 reference range

:



                                                                 10*3/?L. The



                                                                 reference range

 was



                                                                 not used to



                                                                 interpret this



                                                                 result as



                                                                 normal/abnormal

.

 

             GRAN MAT (NEUT) % 72.2 %                                 



             (test code = 770-8)                                        

 

             IMM GRAN % (test code 0.60 %                                 



             = 9695399322)                                        

 

             LYMPH % (test code = 17.7 %                                 



             736-9)                                              

 

             MONO % (test code = 7.4 %                                  



             5905-5)                                             

 

             EOS % (test code = 1.8 %                                  



             713-8)                                              

 

             BASO % (test code = 0.3 %                                  



             706-2)                                              

 

             GRAN MAT x10^3(ANC) 9.09 10*3/uL 1.99-6.95    H            



             (test code =                                        



             5314533904)                                         

 

             IMM GRAN x10^3 (test 0.07 10*3/uL 0.00-0.06    H            



             code = 3086111358)                                        

 

             LYMPH x10^3 (test code 2.22 10*3/uL 1.09-3.23                 



             = 731-0)                                            

 

             MONO x10^3 (test code 0.93 10*3/uL 0.36-1.02                 



             = 742-7)                                            

 

             EOS x10^3 (test code = 0.22 10*3/uL 0.06-0.53                 



             711-2)                                              

 

             BASO x10^3 (test code 0.04 10*3/uL 0.01-0.09                 



             = 704-7)                                            

 

             Lab Interpretation Abnormal                               



             (test code = 80898-5)                                        



[facility]. METABOLIC PANEL [id number] 
04:36:41





             Test Item    Value        Reference Range Interpretation Comments

 

             NA (test code = 138 mmol/L   135-145                   



             0494025381)                                         

 

             K (test code = 4.6 mmol/L   3.5-5.0                   



             5228891734)                                         

 

             CL (test code = 105 mmol/L                       



             0401158021)                                         

 

             CO2 TOTAL (test code = 21 mmol/L    23-31        L            



             8915054580)                                         

 

             AGAP (test code =              2-16                      



             4969328342)                                         

 

             BUN (test code = 13 mg/dL     7-23                      



             8448035110)                                         

 

             GLUCOSE (test code = 167 mg/dL           H            



             3177290692)                                         

 

             CREATININE (test code = 0.48 mg/dL   0.60-1.25    L            



             2494154616)                                         

 

             TOTAL BILI (test code = 0.8 mg/dL    0.1-1.1                   



             8247565630)                                         

 

             CALCIUM (test code = 9.3 mg/dL    8.6-10.6                  



             0565141523)                                         

 

             T PROTEIN (test code = 7.6 g/dL     6.3-8.2                   



             1489368101)                                         

 

             ALBUMIN (test code = 4.2 g/dL     3.5-5.0                   



             9468748622)                                         

 

             ALK PHOS (test code = 98 U/L                           



             2116465401)                                         

 

             ALTv (test code = 71 U/L       5-50         H            



             1742-6)                                             

 

             AST(SGOT) (test code = 36 U/L       13-40                     



             4710429973)                                         

 

             eGFR (test code =              mL/min/1.73m2              



             1700914282)                                         

 

             MAL (test code = MAL) Association of                           



                          Glomerular Filtration                           



                          Rate (GFR) and Staging                           



                          of Kidney Disease*                           



                          +---------------------                           



                          --+-------------------                           



                          --+-------------------                           



                          ------+| GFR                           



                          (mL/min/1.73 m2) ?|                           



                          With Kidney Damage ?|                           



                          ?Without Kidney                           



                          Damage+---------------                           



                          --------+-------------                           



                          --------+-------------                           



                          ------------+| ?>90 ?                           



                          ? ? ? ? ? ? ? ?|                           



                          ?Stage one ? ? ? ? ?|                           



                          ? Normal ? ? ? ? ? ? ?                           



                          ?+--------------------                           



                          ---+------------------                           



                          ---+------------------                           



                          -------+| ?60-89 ? ? ?                           



                          ? ? ? ? ?| ?Stage two                           



                          ? ? ? ? ?| ? Decreased                           



                          GFR ? ? ? ?                            



                          +---------------------                           



                          --+-------------------                           



                          --+-------------------                           



                          ------+| ?30-59 ? ? ?                           



                          ? ? ? ? ?| ?Stage                           



                          three ? ? ? ?| ? Stage                           



                          three ? ? ? ? ?                           



                          +---------------------                           



                          --+-------------------                           



                          --+-------------------                           



                          ------+| ?15-29 ? ? ?                           



                          ? ? ? ? ?| ?Stage four                           



                          ? ? ? ? | ? Stage four                           



                          ? ? ? ? ?                              



                          ?+--------------------                           



                          ---+------------------                           



                          ---+------------------                           



                          -------+| ?<15 (or                           



                          dialysis) ? ?| ?Stage                           



                          five ? ? ? ? | ? Stage                           



                          five ? ? ? ? ?                           



                          ?+--------------------                           



                          ---+------------------                           



                          ---+------------------                           



                          -------+ *Each stage                           



                          assumes the associated                           



                          GFR level has been in                           



                          effect for at least                           



                          three months. ?Stages                           



                          1 to 5, with or                           



                          without kidney                           



                          disease, indicate                           



                          chronic kidney                           



                          disease. Notes:                           



                          Determination of                           



                          stages one and two                           



                          (with eGFR                             



                          >59mL/min/1.73 m2)                           



                          requires estimation of                           



                          kidney damage for at                           



                          least three months as                           



                          defined by structural                           



                          or functional                           



                          abnormalities of the                           



                          kidney, manifested by                           



                          either:Pathological                           



                          abnormalities or                           



                          Markers of kidney                           



                          damage (including                           



                          abnormalities in the                           



                          composition of the                           



                          blood or urine or                           



                          abnormalities in                           



                          imaging tests).                           

 

             Lab Interpretation Abnormal                               



             (test code = 57932-6)                                        



[facility]





             Test Item    Value        Reference Range Interpretation Comments

 

             MAGNESIUM (test code = 6259150702) 1.6 mg/dL    1.7-2.4      L     

       

 

             Lab Interpretation (test code = Abnormal                           

    



             99036-1)                                            



[facility] WITH [id number] 00:23:28





             Test Item    Value        Reference Range Interpretation Comments

 

             WBC (test code =              See_Comment  H             [Automated



             3429-2)                                             message] The sy

stem



                                                                 which generated



                                                                 this result



                                                                 transmitted



                                                                 reference range

:



                                                                 4.20 - 10.70



                                                                 10*3/?L. The



                                                                 reference range

 was



                                                                 not used to



                                                                 interpret this



                                                                 result as



                                                                 normal/abnormal

.

 

             RBC (test code =              See_Comment  H             [Automated



             624-8)                                              message] The sy

stem



                                                                 which generated



                                                                 this result



                                                                 transmitted



                                                                 reference range

:



                                                                 4.26 - 5.52



                                                                 10*6/?L. The



                                                                 reference range

 was



                                                                 not used to



                                                                 interpret this



                                                                 result as



                                                                 normal/abnormal

.

 

             HGB (test code = 18.3 g/dL    12.2-16.4    H            



             718-7)                                              

 

             HCT (test code = 55.6 %       38.4-49.3    H            



             4544-3)                                             

 

             MCV (test code = 89.0 fL      81.7-95.6                 



             787-2)                                              

 

             MCH (test code = 29.3 pg      26.1-32.7                 



             785-6)                                              

 

             MCHC (test code = 32.9 g/dL    31.2-35.0                 



             786-4)                                              

 

             RDW-SD (test code = 47.6 fL      38.5-51.6                 



             03832-5)                                            

 

             RDW-CV (test code = 15.1 %       12.1-15.4                 



             788-0)                                              

 

             PLT (test code =              See_Comment  H             [Automated



             777-3)                                              message] The sy

stem



                                                                 which generated



                                                                 this result



                                                                 transmitted



                                                                 reference range

:



                                                                 150 - 328 10*3/

?L.



                                                                 The reference r

zhen



                                                                 was not used to



                                                                 interpret this



                                                                 result as



                                                                 normal/abnormal

.

 

             MPV (test code = 10.5 fL      9.8-13.0                  



             49466-3)                                            

 

             NRBC/100 WBC (test              See_Comment                [Automat

ed



             code = 0523614445)                                        message] 

The system



                                                                 which generated



                                                                 this result



                                                                 transmitted



                                                                 reference range

:



                                                                 0.0 - 10.0 /100



                                                                 WBCs. The refer

ence



                                                                 range was not u

sed



                                                                 to interpret th

is



                                                                 result as



                                                                 normal/abnormal

.

 

             NRBC x10^3 (test code <0.01        See_Comment                [Auto

mated



             = [id number])                                        message] The s

ystem



                                                                 which generated



                                                                 this result



                                                                 transmitted



                                                                 reference range

:



                                                                 10*3/?L. The



                                                                 reference range

 was



                                                                 not used to



                                                                 interpret this



                                                                 result as



                                                                 normal/abnormal

.

 

             GRAN MAT (NEUT) % 66.7 %                                 



             (test code = 770-8)                                        

 

             IMM GRAN % (test code 0.40 %                                 



             = 7862688828)                                        

 

             LYMPH % (test code = 24.4 %                                 



             736-9)                                              

 

             MONO % (test code = 6.6 %                                  



             5905-5)                                             

 

             EOS % (test code = 1.5 %                                  



             713-8)                                              

 

             BASO % (test code = 0.4 %                                  



             706-2)                                              

 

             GRAN MAT x10^3(ANC) 7.43 10*3/uL 1.99-6.95    H            



             (test code =                                        



             7692139413)                                         

 

             IMM GRAN x10^3 (test 0.04 10*3/uL 0.00-0.06                 



             code = 7540290398)                                        

 

             LYMPH x10^3 (test code 2.72 10*3/uL 1.09-3.23                 



             = 731-0)                                            

 

             MONO x10^3 (test code 0.74 10*3/uL 0.36-1.02                 



             = 742-7)                                            

 

             EOS x10^3 (test code = 0.17 10*3/uL 0.06-0.53                 



             711-2)                                              

 

             BASO x10^3 (test code 0.04 10*3/uL 0.01-0.09                 



             = 704-7)                                            

 

             Lab Interpretation Abnormal                               



             (test code = 66804-4)                                        



[id number] 
[facility]:18:07





             Test Item    Value        Reference Range Interpretation Comments

 

             NA (test code = 139 mmol/L   135-145                   



             4061842192)                                         

 

             K (test code = 4.8 mmol/L   3.5-5.0                   



             4952150967)                                         

 

             CL (test code = 104 mmol/L                       



             2288203196)                                         

 

             CO2 TOTAL (test code = 22 mmol/L    23-31        L            



             4597269289)                                         

 

             AGAP (test code =              2-16                      



             4250482841)                                         

 

             BUN (test code = 15 mg/dL     7-23                      



             3814517292)                                         

 

             GLUCOSE (test code = 93 mg/dL                         



             5993820738)                                         

 

             CREATININE (test code = 0.67 mg/dL   0.60-1.25                 



             3983289509)                                         

 

             TOTAL BILI (test code = 0.7 mg/dL    0.1-1.1                   



             8622702861)                                         

 

             CALCIUM (test code = 9.8 mg/dL    8.6-10.6                  



             1974831083)                                         

 

             T PROTEIN (test code = 8.9 g/dL     6.3-8.2      H            



             2929485958)                                         

 

             ALBUMIN (test code = 4.9 g/dL     3.5-5.0                   



             2906910011)                                         

 

             ALK PHOS (test code = 126 U/L             H            



             4637506118)                                         

 

             ALTv (test code = 43 U/L       5-50                      



             1742-6)                                             

 

             AST(SGOT) (test code = 28 U/L       13-40                     



             2153004385)                                         

 

             eGFR (test code =              mL/min/1.73m2              



             9852624402)                                         

 

             MAL (test code = MAL) Association of                           



                          Glomerular Filtration                           



                          Rate (GFR) and Staging                           



                          of Kidney Disease*                           



                          +---------------------                           



                          --+-------------------                           



                          --+-------------------                           



                          ------+| GFR                           



                          (mL/min/1.73 m2) ?|                           



                          With Kidney Damage ?|                           



                          ?Without Kidney                           



                          Damage+---------------                           



                          --------+-------------                           



                          --------+-------------                           



                          ------------+| ?>90 ?                           



                          ? ? ? ? ? ? ? ?|                           



                          ?Stage one ? ? ? ? ?|                           



                          ? Normal ? ? ? ? ? ? ?                           



                          ?+--------------------                           



                          ---+------------------                           



                          ---+------------------                           



                          -------+| ?60-89 ? ? ?                           



                          ? ? ? ? ?| ?Stage two                           



                          ? ? ? ? ?| ? Decreased                           



                          GFR ? ? ? ?                            



                          +---------------------                           



                          --+-------------------                           



                          --+-------------------                           



                          ------+| ?30-59 ? ? ?                           



                          ? ? ? ? ?| ?Stage                           



                          three ? ? ? ?| ? Stage                           



                          three ? ? ? ? ?                           



                          +---------------------                           



                          --+-------------------                           



                          --+-------------------                           



                          ------+| ?15-29 ? ? ?                           



                          ? ? ? ? ?| ?Stage four                           



                          ? ? ? ? | ? Stage four                           



                          ? ? ? ? ?                              



                          ?+--------------------                           



                          ---+------------------                           



                          ---+------------------                           



                          -------+| ?<15 (or                           



                          dialysis) ? ?| ?Stage                           



                          five ? ? ? ? | ? Stage                           



                          five ? ? ? ? ?                           



                          ?+--------------------                           



                          ---+------------------                           



                          ---+------------------                           



                          -------+ *Each stage                           



                          assumes the associated                           



                          GFR level has been in                           



                          effect for at least                           



                          three months. ?Stages                           



                          1 to 5, with or                           



                          without kidney                           



                          disease, indicate                           



                          chronic kidney                           



                          disease. Notes:                           



                          Determination of                           



                          stages one and two                           



                          (with eGFR                             



                          >59mL/min/1.73 m2)                           



                          requires estimation of                           



                          kidney damage for at                           



                          least three months as                           



                          defined by structural                           



                          or functional                           



                          abnormalities of the                           



                          kidney, manifested by                           



                          either:Pathological                           



                          abnormalities or                           



                          Markers of kidney                           



                          damage (including                           



                          abnormalities in the                           



                          composition of the                           



                          blood or urine or                           



                          abnormalities in                           



                          imaging tests).                           

 

             Lab Interpretation Abnormal                               



             (test code = 53414-3)                                        



[facility]. METABOLIC PANEL ([id number])2022 
12:48:40





             Test Item    Value        Reference Range Interpretation Comments

 

             NA (test code = 137 mmol/L   135-145                   



             3376867195)                                         

 

             K (test code = 4.5 mmol/L   3.5-5.0                   



             4072324531)                                         

 

             CL (test code = 107 mmol/L                       



             4316554750)                                         

 

             CO2 TOTAL (test code = 24 mmol/L    23-31                     



             4506931901)                                         

 

             AGAP (test code =              2-16                      



             2034814786)                                         

 

             BUN (test code = 16 mg/dL     7-23                      



             5078200514)                                         

 

             GLUCOSE (test code = 116 mg/dL           H            



             4875725186)                                         

 

             CREATININE (test code = 0.62 mg/dL   0.60-1.25                 



             2381818018)                                         

 

             TOTAL BILI (test code = 0.4 mg/dL    0.1-1.1                   



             7035301786)                                         

 

             CALCIUM (test code = 8.7 mg/dL    8.6-10.6                  



             0394090683)                                         

 

             T PROTEIN (test code = 7.5 g/dL     6.3-8.2                   



             3173217657)                                         

 

             ALBUMIN (test code = 3.9 g/dL     3.5-5.0                   



             6672982033)                                         

 

             ALK PHOS (test code = 93 U/L                           



             6664494377)                                         

 

             ALTv (test code = 40 U/L       5-50                      



             1742-6)                                             

 

             AST(SGOT) (test code = 51 U/L       13-40        H            



             5794413600)                                         

 

             eGFR (test code =              mL/min/1.73m2              



             0031441313)                                         

 

             MAL (test code = MAL) Association of                           



                          Glomerular Filtration                           



                          Rate (GFR) and Staging                           



                          of Kidney Disease*                           



                          +---------------------                           



                          --+-------------------                           



                          --+-------------------                           



                          ------+| GFR                           



                          (mL/min/1.73 m2) ?|                           



                          With Kidney Damage ?|                           



                          ?Without Kidney                           



                          Damage+---------------                           



                          --------+-------------                           



                          --------+-------------                           



                          ------------+| ?>90 ?                           



                          ? ? ? ? ? ? ? ?|                           



                          ?Stage one ? ? ? ? ?|                           



                          ? Normal ? ? ? ? ? ? ?                           



                          ?+--------------------                           



                          ---+------------------                           



                          ---+------------------                           



                          -------+| ?60-89 ? ? ?                           



                          ? ? ? ? ?| ?Stage two                           



                          ? ? ? ? ?| ? Decreased                           



                          GFR ? ? ? ?                            



                          +---------------------                           



                          --+-------------------                           



                          --+-------------------                           



                          ------+| ?30-59 ? ? ?                           



                          ? ? ? ? ?| ?Stage                           



                          three ? ? ? ?| ? Stage                           



                          three ? ? ? ? ?                           



                          +---------------------                           



                          --+-------------------                           



                          --+-------------------                           



                          ------+| ?15-29 ? ? ?                           



                          ? ? ? ? ?| ?Stage four                           



                          ? ? ? ? | ? Stage four                           



                          ? ? ? ? ?                              



                          ?+--------------------                           



                          ---+------------------                           



                          ---+------------------                           



                          -------+| ?<15 (or                           



                          dialysis) ? ?| ?Stage                           



                          five ? ? ? ? | ? Stage                           



                          five ? ? ? ? ?                           



                          ?+--------------------                           



                          ---+------------------                           



                          ---+------------------                           



                          -------+ *Each stage                           



                          assumes the associated                           



                          GFR level has been in                           



                          effect for at least                           



                          three months. ?Stages                           



                          1 to 5, with or                           



                          without kidney                           



                          disease, indicate                           



                          chronic kidney                           



                          disease. Notes:                           



                          Determination of                           



                          stages one and two                           



                          (with eGFR                             



                          >59mL/min/1.73 m2)                           



                          requires estimation of                           



                          kidney damage for at                           



                          least three months as                           



                          defined by structural                           



                          or functional                           



                          abnormalities of the                           



                          kidney, manifested by                           



                          either:Pathological                           



                          abnormalities or                           



                          Markers of kidney                           



                          damage (including                           



                          abnormalities in the                           



                          composition of the                           



                          blood or urine or                           



                          abnormalities in                           



                          imaging tests).                           

 

             Lab Interpretation Abnormal                               



             (test code = 80610-3)                                        



[facility] WITH [id number] 12:21:36





             Test Item    Value        Reference Range Interpretation Comments

 

             WBC (test code =              See_Comment                [Automated



             6690-2)                                             message] The sy

stem



                                                                 which generated



                                                                 this result



                                                                 transmitted



                                                                 reference range

:



                                                                 4.20 - 10.70



                                                                 10*3/?L. The



                                                                 reference range

 was



                                                                 not used to



                                                                 interpret this



                                                                 result as



                                                                 normal/abnormal

.

 

             RBC (test code =              See_Comment                [Automated



             789-8)                                              message] The sy

stem



                                                                 which generated



                                                                 this result



                                                                 transmitted



                                                                 reference range

:



                                                                 4.26 - 5.52



                                                                 10*6/?L. The



                                                                 reference range

 was



                                                                 not used to



                                                                 interpret this



                                                                 result as



                                                                 normal/abnormal

.

 

             HGB (test code = 15.7 g/dL    12.2-16.4                 



             718-7)                                              

 

             HCT (test code = 48.0 %       38.4-49.3                 



             4544-3)                                             

 

             MCV (test code = 90.1 fL      81.7-95.6                 



             787-2)                                              

 

             MCH (test code = 29.5 pg      26.1-32.7                 



             785-6)                                              

 

             MCHC (test code = 32.7 g/dL    31.2-35.0                 



             786-4)                                              

 

             RDW-SD (test code = 50.6 fL      38.5-51.6                 



             18931-5)                                            

 

             RDW-CV (test code = 15.3 %       12.1-15.4                 



             788-0)                                              

 

             PLT (test code =              See_Comment  H             [Automated



             777-3)                                              message] The sy

stem



                                                                 which generated



                                                                 this result



                                                                 transmitted



                                                                 reference range

:



                                                                 150 - 328 10*3/

?L.



                                                                 The reference r

zhen



                                                                 was not used to



                                                                 interpret this



                                                                 result as



                                                                 normal/abnormal

.

 

             MPV (test code = 10.3 fL      9.8-13.0                  



             75600-9)                                            

 

             NRBC/100 WBC (test              See_Comment                [Automat

ed



             code = 4166201249)                                        message] 

The system



                                                                 which generated



                                                                 this result



                                                                 transmitted



                                                                 reference range

:



                                                                 0.0 - 10.0 /100



                                                                 WBCs. The refer

ence



                                                                 range was not u

sed



                                                                 to interpret th

is



                                                                 result as



                                                                 normal/abnormal

.

 

             NRBC x10^3 (test code <0.01        See_Comment                [Auto

mated



             = [id number])                                        message] The s

ystem



                                                                 which generated



                                                                 this result



                                                                 transmitted



                                                                 reference range

:



                                                                 10*3/?L. The



                                                                 reference range

 was



                                                                 not used to



                                                                 interpret this



                                                                 result as



                                                                 normal/abnormal

.

 

             GRAN MAT (NEUT) % 61.5 %                                 



             (test code = 770-8)                                        

 

             IMM GRAN % (test code 0.80 %                                 



             = [id number])                                        

 

             LYMPH % (test code = 27.8 %                                 



             736-9)                                              

 

             MONO % (test code = 6.9 %                                  



             5905-5)                                             

 

             EOS % (test code = 2.4 %                                  



             713-8)                                              

 

             BASO % (test code = 0.6 %                                  



             706-2)                                              

 

             GRAN MAT x10^3(ANC) 6.07 10*3/uL 1.99-6.95                 



             (test code =                                        



             [id number])                                         

 

             IMM GRAN x10^3 (test 0.08 10*3/uL 0.00-0.06    H            



             code = 6291407219)                                        

 

             LYMPH x10^3 (test code 2.75 10*3/uL 1.09-3.23                 



             = 731-0)                                            

 

             MONO x10^3 (test code 0.68 10*3/uL 0.36-1.02                 



             = 742-7)                                            

 

             EOS x10^3 (test code = 0.24 10*3/uL 0.06-0.53                 



             711-2)                                              

 

             BASO x10^3 (test code 0.06 10*3/uL 0.01-0.09                 



             = 704-7)                                            

 

             Lab Interpretation Abnormal                               



             (test code = 92118-1)                                        



[facility], K, CL, CO2, 
GLUCOSE, BUN, CREATININE, CA)2022 12:40:30





             Test Item    Value        Reference Range Interpretation Comments

 

             NA (test code = 139 mmol/L   135-145                   



             4382378912)                                         

 

             K (test code = 3.9 mmol/L   3.5-5.0                   



             2995316500)                                         

 

             CL (test code = 108 mmol/L                       



             0807636761)                                         

 

             CO2 TOTAL (test code = 25 mmol/L    23-31                     



             1187566710)                                         

 

             AGAP (test code =              2-16                      



             7800290182)                                         

 

             BUN (test code = 14 mg/dL     7-23                      



             4850125973)                                         

 

             GLUCOSE (test code = 107 mg/dL                        



             4096978277)                                         

 

             CREATININE (test code = 0.61 mg/dL   0.60-1.25                 



             9936690622)                                         

 

             CALCIUM (test code = 8.1 mg/dL    8.6-10.6     L            



             9005131794)                                         

 

             eGFR (test code =              mL/min/1.73m2              



             0761627818)                                         

 

             MAL (test code = MAL) Association of                           



                          Glomerular Filtration                           



                          Rate (GFR) and Staging                           



                          of Kidney Disease*                           



                          +---------------------                           



                          --+-------------------                           



                          --+-------------------                           



                          ------+| GFR                           



                          (mL/min/1.73 m2) ?|                           



                          With Kidney Damage ?|                           



                          ?Without Kidney                           



                          Damage+---------------                           



                          --------+-------------                           



                          --------+-------------                           



                          ------------+| ?>90 ?                           



                          ? ? ? ? ? ? ? ?|                           



                          ?Stage one ? ? ? ? ?|                           



                          ? Normal ? ? ? ? ? ? ?                           



                          ?+--------------------                           



                          ---+------------------                           



                          ---+------------------                           



                          -------+| ?60-89 ? ? ?                           



                          ? ? ? ? ?| ?Stage two                           



                          ? ? ? ? ?| ? Decreased                           



                          GFR ? ? ? ?                            



                          +---------------------                           



                          --+-------------------                           



                          --+-------------------                           



                          ------+| ?30-59 ? ? ?                           



                          ? ? ? ? ?| ?Stage                           



                          three ? ? ? ?| ? Stage                           



                          three ? ? ? ? ?                           



                          +---------------------                           



                          --+-------------------                           



                          --+-------------------                           



                          ------+| ?15-29 ? ? ?                           



                          ? ? ? ? ?| ?Stage four                           



                          ? ? ? ? | ? Stage four                           



                          ? ? ? ? ?                              



                          ?+--------------------                           



                          ---+------------------                           



                          ---+------------------                           



                          -------+| ?<15 (or                           



                          dialysis) ? ?| ?Stage                           



                          five ? ? ? ? | ? Stage                           



                          five ? ? ? ? ?                           



                          ?+--------------------                           



                          ---+------------------                           



                          ---+------------------                           



                          -------+ *Each stage                           



                          assumes the associated                           



                          GFR level has been in                           



                          effect for at least                           



                          three months. ?Stages                           



                          1 to 5, with or                           



                          without kidney                           



                          disease, indicate                           



                          chronic kidney                           



                          disease. Notes:                           



                          Determination of                           



                          stages one and two                           



                          (with eGFR                             



                          >59mL/min/1.73 m2)                           



                          requires estimation of                           



                          kidney damage for at                           



                          least three months as                           



                          defined by structural                           



                          or functional                           



                          abnormalities of the                           



                          kidney, manifested by                           



                          either:Pathological                           



                          abnormalities or                           



                          Markers of kidney                           



                          damage (including                           



                          abnormalities in the                           



                          composition of the                           



                          blood or urine or                           



                          abnormalities in                           



                          imaging tests).                           

 

             Lab Interpretation Abnormal                               



             (test code = 46480-0)                                        



[facility] with [id number] 12:23:51





             Test Item    Value        Reference Range Interpretation Comments

 

             WBC (test code =              See_Comment                [Automated



             3308-2)                                             message] The sy

stem



                                                                 which generated



                                                                 this result



                                                                 transmitted



                                                                 reference range

:



                                                                 4.20 - 10.70



                                                                 10*3/?L. The



                                                                 reference range

 was



                                                                 not used to



                                                                 interpret this



                                                                 result as



                                                                 normal/abnormal

.

 

             RBC (test code =              See_Comment                [Automated



             356-6)                                              message] The sy

stem



                                                                 which generated



                                                                 this result



                                                                 transmitted



                                                                 reference range

:



                                                                 4.26 - 5.52



                                                                 10*6/?L. The



                                                                 reference range

 was



                                                                 not used to



                                                                 interpret this



                                                                 result as



                                                                 normal/abnormal

.

 

             HGB (test code = 14.9 g/dL    12.2-16.4                 



             718-7)                                              

 

             HCT (test code = 44.2 %       38.4-49.3                 



             4544-3)                                             

 

             MCV (test code = 88.4 fL      81.7-95.6                 



             787-2)                                              

 

             MCH (test code = 29.8 pg      26.1-32.7                 



             785-6)                                              

 

             MCHC (test code = 33.7 g/dL    31.2-35.0                 



             786-4)                                              

 

             RDW-SD (test code = 49.3 fL      38.5-51.6                 



             80409-3)                                            

 

             RDW-CV (test code = 15.3 %       12.1-15.4                 



             788-0)                                              

 

             PLT (test code =              See_Comment  H             [Automated



             777-3)                                              message] The sy

stem



                                                                 which generated



                                                                 this result



                                                                 transmitted



                                                                 reference range

:



                                                                 150 - 328 10*3/

?L.



                                                                 The reference r

zhen



                                                                 was not used to



                                                                 interpret this



                                                                 result as



                                                                 normal/abnormal

.

 

             MPV (test code = 10.2 fL      9.8-13.0                  



             17180-6)                                            

 

             NRBC/100 WBC (test              See_Comment                [Automat

ed



             code = 9438735962)                                        message] 

The system



                                                                 which generated



                                                                 this result



                                                                 transmitted



                                                                 reference range

:



                                                                 0.0 - 10.0 /100



                                                                 WBCs. The refer

ence



                                                                 range was not u

sed



                                                                 to interpret th

is



                                                                 result as



                                                                 normal/abnormal

.

 

             NRBC x10^3 (test code <0.01        See_Comment                [Auto

mated



             = [id number])                                        message] The s

ystem



                                                                 which generated



                                                                 this result



                                                                 transmitted



                                                                 reference range

:



                                                                 10*3/?L. The



                                                                 reference range

 was



                                                                 not used to



                                                                 interpret this



                                                                 result as



                                                                 normal/abnormal

.

 

             GRAN MAT (NEUT) % 56.7 %                                 



             (test code = 770-8)                                        

 

             IMM GRAN % (test code 0.60 %                                 



             = [id number])                                        

 

             LYMPH % (test code = 31.1 %                                 



             736-9)                                              

 

             MONO % (test code = 8.7 %                                  



             5905-5)                                             

 

             EOS % (test code = 2.4 %                                  



             713-8)                                              

 

             BASO % (test code = 0.5 %                                  



             706-2)                                              

 

             GRAN MAT x10^3(ANC) 4.83 10*3/uL 1.99-6.95                 



             (test code =                                        



             1276496050)                                         

 

             IMM GRAN x10^3 (test 0.05 10*3/uL 0.00-0.06                 



             code = 2393877669)                                        

 

             LYMPH x10^3 (test code 2.64 10*3/uL 1.09-3.23                 



             = 731-0)                                            

 

             MONO x10^3 (test code 0.74 10*3/uL 0.36-1.02                 



             = 742-7)                                            

 

             EOS x10^3 (test code = 0.20 10*3/uL 0.06-0.53                 



             711-2)                                              

 

             BASO x10^3 (test code 0.04 10*3/uL 0.01-0.09                 



             = 704-7)                                            

 

             Lab Interpretation Abnormal                               



             (test code = 91442-3)                                        



[facility]. METABOLIC PANEL ([id number] 
06:32:14





             Test Item    Value        Reference Range Interpretation Comments

 

             NA (test code = 139 mmol/L   135-145                   



             5558770695)                                         

 

             K (test code = 4.5 mmol/L   3.5-5.0                   



             6211533648)                                         

 

             CL (test code = 102 mmol/L                       



             2744539038)                                         

 

             CO2 TOTAL (test code = 26 mmol/L    23-31                     



             2331234013)                                         

 

             AGAP (test code =              2-16                      



             3442926819)                                         

 

             BUN (test code = 16 mg/dL     7-23                      



             7742043194)                                         

 

             GLUCOSE (test code = 99 mg/dL                         



             8164252105)                                         

 

             CREATININE (test code = 0.83 mg/dL   0.60-1.25                 



             3339724289)                                         

 

             TOTAL BILI (test code = 0.6 mg/dL    0.1-1.1                   



             4989953290)                                         

 

             CALCIUM (test code = 9.5 mg/dL    8.6-10.6                  



             7727158247)                                         

 

             T PROTEIN (test code = 8.6 g/dL     6.3-8.2      H            



             4191623832)                                         

 

             ALBUMIN (test code = 4.6 g/dL     3.5-5.0                   



             2560967185)                                         

 

             ALK PHOS (test code = 121 U/L                          



             6773228174)                                         

 

             ALTv (test code = 58 U/L       5-50         H            



             1742-6)                                             

 

             AST(SGOT) (test code = 32 U/L       13-40                     



             1843425289)                                         

 

             eGFR (test code =              mL/min/1.73m2              



             5494211910)                                         

 

             MAL (test code = MAL) Association of                           



                          Glomerular Filtration                           



                          Rate (GFR) and Staging                           



                          of Kidney Disease*                           



                          +---------------------                           



                          --+-------------------                           



                          --+-------------------                           



                          ------+| GFR                           



                          (mL/min/1.73 m2) ?|                           



                          With Kidney Damage ?|                           



                          ?Without Kidney                           



                          Damage+---------------                           



                          --------+-------------                           



                          --------+-------------                           



                          ------------+| ?>90 ?                           



                          ? ? ? ? ? ? ? ?|                           



                          ?Stage one ? ? ? ? ?|                           



                          ? Normal ? ? ? ? ? ? ?                           



                          ?+--------------------                           



                          ---+------------------                           



                          ---+------------------                           



                          -------+| ?60-89 ? ? ?                           



                          ? ? ? ? ?| ?Stage two                           



                          ? ? ? ? ?| ? Decreased                           



                          GFR ? ? ? ?                            



                          +---------------------                           



                          --+-------------------                           



                          --+-------------------                           



                          ------+| ?30-59 ? ? ?                           



                          ? ? ? ? ?| ?Stage                           



                          three ? ? ? ?| ? Stage                           



                          three ? ? ? ? ?                           



                          +---------------------                           



                          --+-------------------                           



                          --+-------------------                           



                          ------+| ?15-29 ? ? ?                           



                          ? ? ? ? ?| ?Stage four                           



                          ? ? ? ? | ? Stage four                           



                          ? ? ? ? ?                              



                          ?+--------------------                           



                          ---+------------------                           



                          ---+------------------                           



                          -------+| ?<15 (or                           



                          dialysis) ? ?| ?Stage                           



                          five ? ? ? ? | ? Stage                           



                          five ? ? ? ? ?                           



                          ?+--------------------                           



                          ---+------------------                           



                          ---+------------------                           



                          -------+ *Each stage                           



                          assumes the associated                           



                          GFR level has been in                           



                          effect for at least                           



                          three months. ?Stages                           



                          1 to 5, with or                           



                          without kidney                           



                          disease, indicate                           



                          chronic kidney                           



                          disease. Notes:                           



                          Determination of                           



                          stages one and two                           



                          (with eGFR                             



                          >59mL/min/1.73 m2)                           



                          requires estimation of                           



                          kidney damage for at                           



                          least three months as                           



                          defined by structural                           



                          or functional                           



                          abnormalities of the                           



                          kidney, manifested by                           



                          either:Pathological                           



                          abnormalities or                           



                          Markers of kidney                           



                          damage (including                           



                          abnormalities in the                           



                          composition of the                           



                          blood or urine or                           



                          abnormalities in                           



                          imaging tests).                           

 

             Lab Interpretation Abnormal                               



             (test code = 92589-4)                                        



[facility] WITH [id number] 05:48:31





             Test Item    Value        Reference Range Interpretation Comments

 

             WBC (test code =              See_Comment  H             [Automated



             2990-2)                                             message] The sy

stem



                                                                 which generated



                                                                 this result



                                                                 transmitted



                                                                 reference range

:



                                                                 4.20 - 10.70



                                                                 10*3/?L. The



                                                                 reference range

 was



                                                                 not used to



                                                                 interpret this



                                                                 result as



                                                                 normal/abnormal

.

 

             RBC (test code =              See_Comment  H             [Automated



             889-8)                                              message] The sy

stem



                                                                 which generated



                                                                 this result



                                                                 transmitted



                                                                 reference range

:



                                                                 4.26 - 5.52



                                                                 10*6/?L. The



                                                                 reference range

 was



                                                                 not used to



                                                                 interpret this



                                                                 result as



                                                                 normal/abnormal

.

 

             HGB (test code = 17.3 g/dL    12.2-16.4    H            



             718-7)                                              

 

             HCT (test code = 51.4 %       38.4-49.3    H            



             4544-3)                                             

 

             MCV (test code = 87.7 fL      81.7-95.6                 



             787-2)                                              

 

             MCH (test code = 29.5 pg      26.1-32.7                 



             785-6)                                              

 

             MCHC (test code = 33.7 g/dL    31.2-35.0                 



             786-4)                                              

 

             RDW-SD (test code = 49.1 fL      38.5-51.6                 



             21399-9)                                            

 

             RDW-CV (test code = 15.5 %       12.1-15.4    H            



             788-0)                                              

 

             PLT (test code =              See_Comment  H             [Automated



             777-3)                                              message] The sy

stem



                                                                 which generated



                                                                 this result



                                                                 transmitted



                                                                 reference range

:



                                                                 150 - 328 10*3/

?L.



                                                                 The reference r

zhen



                                                                 was not used to



                                                                 interpret this



                                                                 result as



                                                                 normal/abnormal

.

 

             MPV (test code = 10.4 fL      9.8-13.0                  



             87830-3)                                            

 

             NRBC/100 WBC (test              See_Comment                [Automat

ed



             code = 9677659609)                                        message] 

The system



                                                                 which generated



                                                                 this result



                                                                 transmitted



                                                                 reference range

:



                                                                 0.0 - 10.0 /100



                                                                 WBCs. The refer

ence



                                                                 range was not u

sed



                                                                 to interpret th

is



                                                                 result as



                                                                 normal/abnormal

.

 

             NRBC x10^3 (test code <0.01        See_Comment                [Auto

mated



             = 5951385524)                                        message] The s

ystem



                                                                 which generated



                                                                 this result



                                                                 transmitted



                                                                 reference range

:



                                                                 10*3/?L. The



                                                                 reference range

 was



                                                                 not used to



                                                                 interpret this



                                                                 result as



                                                                 normal/abnormal

.

 

             GRAN MAT (NEUT) % 64.8 %                                 



             (test code = 770-8)                                        

 

             IMM GRAN % (test code 0.70 %                                 



             = 4219125781)                                        

 

             LYMPH % (test code = 25.2 %                                 



             736-9)                                              

 

             MONO % (test code = 6.9 %                                  



             5905-5)                                             

 

             EOS % (test code = 1.9 %                                  



             713-8)                                              

 

             BASO % (test code = 0.5 %                                  



             706-2)                                              

 

             GRAN MAT x10^3(ANC) 7.80 10*3/uL 1.99-6.95    H            



             (test code =                                        



             9950979996)                                         

 

             IMM GRAN x10^3 (test 0.08 10*3/uL 0.00-0.06    H            



             code = 1666333235)                                        

 

             LYMPH x10^3 (test code 3.04 10*3/uL 1.09-3.23                 



             = 731-0)                                            

 

             MONO x10^3 (test code 0.83 10*3/uL 0.36-1.02                 



             = 742-7)                                            

 

             EOS x10^3 (test code = 0.23 10*3/uL 0.06-0.53                 



             711-2)                                              

 

             BASO x10^3 (test code 0.06 10*3/uL 0.01-0.09                 



             = 704-7)                                            

 

             Lab Interpretation Abnormal                               



             (test code = 71519-5)                                        



[facility]. METABOLIC PANEL ([id number] 
02:19:08





             Test Item    Value        Reference Range Interpretation Comments

 

             NA (test code = 136 mmol/L   135-145                   



             2311267218)                                         

 

             K (test code = 4.4 mmol/L   3.5-5.0                   



             9578868884)                                         

 

             CL (test code = 99 mmol/L                        



             3198443064)                                         

 

             CO2 TOTAL (test code = 25 mmol/L    23-31                     



             1611729626)                                         

 

             AGAP (test code =              2-16                      



             4739763641)                                         

 

             BUN (test code = 19 mg/dL     7-23                      



             3846421021)                                         

 

             GLUCOSE (test code = 103 mg/dL                        



             5293901148)                                         

 

             CREATININE (test code = 0.70 mg/dL   0.60-1.25                 



             0020430289)                                         

 

             TOTAL BILI (test code = 0.7 mg/dL    0.1-1.1                   



             1510699282)                                         

 

             CALCIUM (test code = 9.2 mg/dL    8.6-10.6                  



             0487180253)                                         

 

             T PROTEIN (test code = 9.4 g/dL     6.3-8.2      H            



             1513024822)                                         

 

             ALBUMIN (test code = 4.8 g/dL     3.5-5.0                   



             6763825396)                                         

 

             ALK PHOS (test code = 146 U/L             H            



             8321861484)                                         

 

             ALTv (test code = 75 U/L       5-50         H            



             1742-6)                                             

 

             AST(SGOT) (test code = 37 U/L       13-40                     



             4492931788)                                         

 

             eGFR (test code =              mL/min/1.73m2              



             2187268241)                                         

 

             MAL (test code = MAL) Association of                           



                          Glomerular Filtration                           



                          Rate (GFR) and Staging                           



                          of Kidney Disease*                           



                          +---------------------                           



                          --+-------------------                           



                          --+-------------------                           



                          ------+| GFR                           



                          (mL/min/1.73 m2) ?|                           



                          With Kidney Damage ?|                           



                          ?Without Kidney                           



                          Damage+---------------                           



                          --------+-------------                           



                          --------+-------------                           



                          ------------+| ?>90 ?                           



                          ? ? ? ? ? ? ? ?|                           



                          ?Stage one ? ? ? ? ?|                           



                          ? Normal ? ? ? ? ? ? ?                           



                          ?+--------------------                           



                          ---+------------------                           



                          ---+------------------                           



                          -------+| ?60-89 ? ? ?                           



                          ? ? ? ? ?| ?Stage two                           



                          ? ? ? ? ?| ? Decreased                           



                          GFR ? ? ? ?                            



                          +---------------------                           



                          --+-------------------                           



                          --+-------------------                           



                          ------+| ?30-59 ? ? ?                           



                          ? ? ? ? ?| ?Stage                           



                          three ? ? ? ?| ? Stage                           



                          three ? ? ? ? ?                           



                          +---------------------                           



                          --+-------------------                           



                          --+-------------------                           



                          ------+| ?15-29 ? ? ?                           



                          ? ? ? ? ?| ?Stage four                           



                          ? ? ? ? | ? Stage four                           



                          ? ? ? ? ?                              



                          ?+--------------------                           



                          ---+------------------                           



                          ---+------------------                           



                          -------+| ?<15 (or                           



                          dialysis) ? ?| ?Stage                           



                          five ? ? ? ? | ? Stage                           



                          five ? ? ? ? ?                           



                          ?+--------------------                           



                          ---+------------------                           



                          ---+------------------                           



                          -------+ *Each stage                           



                          assumes the associated                           



                          GFR level has been in                           



                          effect for at least                           



                          three months. ?Stages                           



                          1 to 5, with or                           



                          without kidney                           



                          disease, indicate                           



                          chronic kidney                           



                          disease. Notes:                           



                          Determination of                           



                          stages one and two                           



                          (with eGFR                             



                          >59mL/min/1.73 m2)                           



                          requires estimation of                           



                          kidney damage for at                           



                          least three months as                           



                          defined by structural                           



                          or functional                           



                          abnormalities of the                           



                          kidney, manifested by                           



                          either:Pathological                           



                          abnormalities or                           



                          Markers of kidney                           



                          damage (including                           



                          abnormalities in the                           



                          composition of the                           



                          blood or urine or                           



                          abnormalities in                           



                          imaging tests).                           

 

             Lab Interpretation Abnormal                               



             (test code = 78659-5)                                        



[facility]





             Test Item    Value        Reference Range Interpretation Comments

 

             LIPASE (test code = 0422156677) 86 U/L       0-220                 

    

 

             Lab Interpretation (test code = Normal                             

    



             75157-2)                                            



[facility] WITH [id number] 01:55:49





             Test Item    Value        Reference Range Interpretation Comments

 

             WBC (test code =              See_Comment  H             [Automated



             6690-2)                                             message] The sy

stem



                                                                 which generated



                                                                 this result



                                                                 transmitted



                                                                 reference range

:



                                                                 4.20 - 10.70



                                                                 10*3/?L. The



                                                                 reference range

 was



                                                                 not used to



                                                                 interpret this



                                                                 result as



                                                                 normal/abnormal

.

 

             RBC (test code =              See_Comment  H             [Automated



             789-8)                                              message] The sy

stem



                                                                 which generated



                                                                 this result



                                                                 transmitted



                                                                 reference range

:



                                                                 4.26 - 5.52



                                                                 10*6/?L. The



                                                                 reference range

 was



                                                                 not used to



                                                                 interpret this



                                                                 result as



                                                                 normal/abnormal

.

 

             HGB (test code = 18.0 g/dL    12.2-16.4    H            



             718-7)                                              

 

             HCT (test code = 53.9 %       38.4-49.3    H            



             4544-3)                                             

 

             MCV (test code = 87.2 fL      81.7-95.6                 



             787-2)                                              

 

             MCH (test code = 29.1 pg      26.1-32.7                 



             785-6)                                              

 

             MCHC (test code = 33.4 g/dL    31.2-35.0                 



             786-4)                                              

 

             RDW-SD (test code = 48.7 fL      38.5-51.6                 



             08925-5)                                            

 

             RDW-CV (test code = 15.4 %       12.1-15.4                 



             788-0)                                              

 

             PLT (test code =              See_Comment  H             [Automated



             777-3)                                              message] The sy

stem



                                                                 which generated



                                                                 this result



                                                                 transmitted



                                                                 reference range

:



                                                                 150 - 328 10*3/

?L.



                                                                 The reference r

zhen



                                                                 was not used to



                                                                 interpret this



                                                                 result as



                                                                 normal/abnormal

.

 

             MPV (test code = 9.9 fL       9.8-13.0                  



             25155-5)                                            

 

             NRBC/100 WBC (test              See_Comment                [Automat

ed



             code = 5169855181)                                        message] 

The system



                                                                 which generated



                                                                 this result



                                                                 transmitted



                                                                 reference range

:



                                                                 0.0 - 10.0 /100



                                                                 WBCs. The refer

ence



                                                                 range was not u

sed



                                                                 to interpret th

is



                                                                 result as



                                                                 normal/abnormal

.

 

             NRBC x10^3 (test code <0.01        See_Comment                [Auto

mated



             = 7015941649)                                        message] The s

ystem



                                                                 which generated



                                                                 this result



                                                                 transmitted



                                                                 reference range

:



                                                                 10*3/?L. The



                                                                 reference range

 was



                                                                 not used to



                                                                 interpret this



                                                                 result as



                                                                 normal/abnormal

.

 

             GRAN MAT (NEUT) % 69.8 %                                 



             (test code = 770-8)                                        

 

             IMM GRAN % (test code 0.50 %                                 



             = 0124693520)                                        

 

             LYMPH % (test code = 20.5 %                                 



             736-9)                                              

 

             MONO % (test code = 6.8 %                                  



             5905-5)                                             

 

             EOS % (test code = 1.9 %                                  



             713-8)                                              

 

             BASO % (test code = 0.5 %                                  



             706-2)                                              

 

             GRAN MAT x10^3(ANC) 7.72 10*3/uL 1.99-6.95    H            



             (test code =                                        



             0767806967)                                         

 

             IMM GRAN x10^3 (test 0.05 10*3/uL 0.00-0.06                 



             code = 2682860557)                                        

 

             LYMPH x10^3 (test code 2.26 10*3/uL 1.09-3.23                 



             = 731-0)                                            

 

             MONO x10^3 (test code 0.75 10*3/uL 0.36-1.02                 



             = 742-7)                                            

 

             EOS x10^3 (test code = 0.21 10*3/uL 0.06-0.53                 



             711-2)                                              

 

             BASO x10^3 (test code 0.06 10*3/uL 0.01-0.09                 



             = 704-7)                                            

 

             Lab Interpretation Abnormal                               



             (test code = 18034-5)                                        



[id number]52





             Test Item    Value        Reference    Interpretation Comments



                                       Range                     

 

             D-DIMER (test code =              See_Comment                [Autom

ated



             7854269510)                                         message] The



                                                                 system which



                                                                 generated this



                                                                 result



                                                                 transmitted



                                                                 reference range

:



                                                                 <0.41 ?g/mL



                                                                 (FEU). The



                                                                 reference range



                                                                 was not used to



                                                                 interpret this



                                                                 result as



                                                                 normal/abnormal

.

 

             MAL (test code = This test may be                           



             MAL)         used in conjunction                           



                          with a clinical                           



                          pretest probability                           



                          (PTP) assessment                           



                          model to exclude                           



                          venous                                 



                          thromboembolism                           



                          (VTE) in patients                           



                          suspected of deep                           



                          venous thrombosis                           



                          (DVT) and pulmonary                           



                          embolism (PE)  A                           



                          D-Dimer value less                           



                          than 0.50 ?g/ml                           



                          (FEU) has a negative                           



                          predicative value of                           



                          96 to 100% (95%                           



                          CI)and 97 to 100%                           



                          (95% CI) as an aid                           



                          in the diagnosis of                           



                          deep vein thrombosis                           



                          (DVT) and pulmonary                           



                          embolism when there                           



                          is low or moderate                           



                          pretest probability                           



                          of PE or DVT.                           



                          D-Dimer values are                           



                          expressed in initial                           



                          fibrinogen                             



                          equivalent units                           



                          (FEU)" The assay                           



                          results should be                           



                          used with other                           



                          information,                           



                          including the                           



                          clinical context, in                           



                          forming a diagnosis.                           



                                                                 

 

             Lab Interpretation Normal                                 



             (test code =                                        



             55041-6)                                            



[id number][facility] 
22:42:48





             Test Item    Value        Reference Range Interpretation Comments

 

             NA (test code = 135 mmol/L   135-145                   



             6280959779)                                         

 

             K (test code = 4.6 mmol/L   3.5-5.0                   



             2613459207)                                         

 

             CL (test code = 102 mmol/L                       



             5023126552)                                         

 

             CO2 TOTAL (test code = 24 mmol/L    23-31                     



             2034348969)                                         

 

             AGAP (test code =              2-16                      



             6774890425)                                         

 

             BUN (test code = 17 mg/dL     7-23                      



             6386718093)                                         

 

             GLUCOSE (test code = 107 mg/dL                        



             6687528796)                                         

 

             CREATININE (test code = 0.60 mg/dL   0.60-1.25                 



             0052443691)                                         

 

             TOTAL BILI (test code = 1.0 mg/dL    0.1-1.1                   



             5828046550)                                         

 

             CALCIUM (test code = 9.4 mg/dL    8.6-10.6                  



             8820218459)                                         

 

             T PROTEIN (test code = 8.6 g/dL     6.3-8.2      H            



             7842366624)                                         

 

             ALBUMIN (test code = 4.6 g/dL     3.5-5.0                   



             8475210199)                                         

 

             ALK PHOS (test code = 159 U/L             H            



             0049695534)                                         

 

             ALTv (test code = 77 U/L       5-50         H            



             1742-6)                                             

 

             AST(SGOT) (test code = 38 U/L       13-40                     



             0219882495)                                         

 

             eGFR (test code =              mL/min/1.73m2              



             1705657156)                                         

 

             MAL (test code = MAL) Association of                           



                          Glomerular Filtration                           



                          Rate (GFR) and Staging                           



                          of Kidney Disease*                           



                          +---------------------                           



                          --+-------------------                           



                          --+-------------------                           



                          ------+| GFR                           



                          (mL/min/1.73 m2) ?|                           



                          With Kidney Damage ?|                           



                          ?Without Kidney                           



                          Damage+---------------                           



                          --------+-------------                           



                          --------+-------------                           



                          ------------+| ?>90 ?                           



                          ? ? ? ? ? ? ? ?|                           



                          ?Stage one ? ? ? ? ?|                           



                          ? Normal ? ? ? ? ? ? ?                           



                          ?+--------------------                           



                          ---+------------------                           



                          ---+------------------                           



                          -------+| ?60-89 ? ? ?                           



                          ? ? ? ? ?| ?Stage two                           



                          ? ? ? ? ?| ? Decreased                           



                          GFR ? ? ? ?                            



                          +---------------------                           



                          --+-------------------                           



                          --+-------------------                           



                          ------+| ?30-59 ? ? ?                           



                          ? ? ? ? ?| ?Stage                           



                          three ? ? ? ?| ? Stage                           



                          three ? ? ? ? ?                           



                          +---------------------                           



                          --+-------------------                           



                          --+-------------------                           



                          ------+| ?15-29 ? ? ?                           



                          ? ? ? ? ?| ?Stage four                           



                          ? ? ? ? | ? Stage four                           



                          ? ? ? ? ?                              



                          ?+--------------------                           



                          ---+------------------                           



                          ---+------------------                           



                          -------+| ?<15 (or                           



                          dialysis) ? ?| ?Stage                           



                          five ? ? ? ? | ? Stage                           



                          five ? ? ? ? ?                           



                          ?+--------------------                           



                          ---+------------------                           



                          ---+------------------                           



                          -------+ *Each stage                           



                          assumes the associated                           



                          GFR level has been in                           



                          effect for at least                           



                          three months. ?Stages                           



                          1 to 5, with or                           



                          without kidney                           



                          disease, indicate                           



                          chronic kidney                           



                          disease. Notes:                           



                          Determination of                           



                          stages one and two                           



                          (with eGFR                             



                          >59mL/min/1.73 m2)                           



                          requires estimation of                           



                          kidney damage for at                           



                          least three months as                           



                          defined by structural                           



                          or functional                           



                          abnormalities of the                           



                          kidney, manifested by                           



                          either:Pathological                           



                          abnormalities or                           



                          Markers of kidney                           



                          damage (including                           



                          abnormalities in the                           



                          composition of the                           



                          blood or urine or                           



                          abnormalities in                           



                          imaging tests).                           

 

             Lab Interpretation Abnormal                               



             (test code = 25156-9)                                        



[facility] WITH [id number] 22:30:27





             Test Item    Value        Reference Range Interpretation Comments

 

             WBC (test code =              See_Comment  H             [Automated



             6690-2)                                             message] The



                                                                 system which



                                                                 generated this



                                                                 result transmit

huma



                                                                 reference range

:



                                                                 4.20 - 10.70



                                                                 10*3/?L. The



                                                                 reference range



                                                                 was not used to



                                                                 interpret this



                                                                 result as



                                                                 normal/abnormal

.

 

             RBC (test code =              See_Comment  H             [Automated



             789-8)                                              message] The



                                                                 system which



                                                                 generated this



                                                                 result transmit

huma



                                                                 reference range

:



                                                                 4.26 - 5.52



                                                                 10*6/?L. The



                                                                 reference range



                                                                 was not used to



                                                                 interpret this



                                                                 result as



                                                                 normal/abnormal

.

 

             HGB (test code = 17.5 g/dL    12.2-16.4    H            



             718-7)                                              

 

             HCT (test code = 51.0 %       38.4-49.3    H            



             4544-3)                                             

 

             MCV (test code = 86.7 fL      81.7-95.6                 



             787-2)                                              

 

             MCH (test code = 29.8 pg      26.1-32.7                 



             785-6)                                              

 

             MCHC (test code = 34.3 g/dL    31.2-35.0                 



             786-4)                                              

 

             RDW-SD (test code = 48.0 fL      38.5-51.6                 



             87500-2)                                            

 

             RDW-CV (test code = 15.1 %       12.1-15.4                 



             788-0)                                              

 

             PLT (test code =              See_Comment  H             [Automated



             777-3)                                              message] The



                                                                 system which



                                                                 generated this



                                                                 result transmit

huma



                                                                 reference range

:



                                                                 150 - 328 10*3/

?L.



                                                                 The reference



                                                                 range was not u

sed



                                                                 to interpret th

is



                                                                 result as



                                                                 normal/abnormal

.

 

             MPV (test code = 10.3 fL      9.8-13.0                  



             32378-8)                                            

 

             NRBC/100 WBC (test              See_Comment                [Automat

ed



             code = 4367350147)                                        message] 

The



                                                                 system which



                                                                 generated this



                                                                 result transmit

huma



                                                                 reference range

:



                                                                 0.0 - 10.0 /100



                                                                 WBCs. The



                                                                 reference range



                                                                 was not used to



                                                                 interpret this



                                                                 result as



                                                                 normal/abnormal

.

 

             NRBC x10^3 (test code <0.01        See_Comment                [Auto

mated



             = [id number])                                        message] The



                                                                 system which



                                                                 generated this



                                                                 result transmit

huma



                                                                 reference range

:



                                                                 10*3/?L. The



                                                                 reference range



                                                                 was not used to



                                                                 interpret this



                                                                 result as



                                                                 normal/abnormal

.

 

             GRAN MAT (NEUT) % 79.9 %                                 



             (test code = 770-8)                                        

 

             IMM GRAN % (test code 0.50 %                                 



             = 4013940493)                                        

 

             LYMPH % (test code = 11.8 %                                 



             736-9)                                              

 

             MONO % (test code = 6.6 %                                  



             5905-5)                                             

 

             EOS % (test code = 0.9 %                                  



             713-8)                                              

 

             BASO % (test code = 0.3 %                                  



             706-2)                                              

 

             GRAN MAT x10^3(ANC) 10.70 10*3/uL 1.99-6.95    H            



             (test code =                                        



             3013665738)                                         

 

             IMM GRAN x10^3 (test 0.07 10*3/uL 0.00-0.06    H            



             code = 5765386571)                                        

 

             LYMPH x10^3 (test code 1.58 10*3/uL 1.09-3.23                 



             = 731-0)                                            

 

             MONO x10^3 (test code 0.89 10*3/uL 0.36-1.02                 



             = 742-7)                                            

 

             EOS x10^3 (test code = 0.12 10*3/uL 0.06-0.53                 



             711-2)                                              

 

             BASO x10^3 (test code 0.04 10*3/uL 0.01-0.09                 



             = 704-7)                                            

 

             Lab Interpretation Abnormal                               



             (test code = 18125-2)                                        



[id number] 
[facility]:15





             Test Item    Value        Reference Range Interpretation Comments

 

             NA (test code = 137 mmol/L   135-145                   



             9229435923)                                         

 

             K (test code = 4.5 mmol/L   3.5-5.0                   



             1382736596)                                         

 

             CL (test code = 109 mmol/L          H            



             7670431707)                                         

 

             CO2 TOTAL (test code = 22 mmol/L    23-31        L            



             3936264900)                                         

 

             AGAP (test code =              2-16                      



             3772297663)                                         

 

             BUN (test code = 7 mg/dL      7-23                      



             2090340123)                                         

 

             GLUCOSE (test code = 87 mg/dL                         



             5485905985)                                         

 

             CREATININE (test code = 0.61 mg/dL   0.60-1.25                 



             1068367345)                                         

 

             TOTAL BILI (test code = 0.5 mg/dL    0.1-1.1                   



             2193868756)                                         

 

             CALCIUM (test code = 9.0 mg/dL    8.6-10.6                  



             6725807270)                                         

 

             T PROTEIN (test code = 6.9 g/dL     6.3-8.2                   



             0676718133)                                         

 

             ALBUMIN (test code = 3.5 g/dL     3.5-5.0                   



             6085357516)                                         

 

             ALK PHOS (test code = 112 U/L                          



             1963588588)                                         

 

             ALTv (test code = 35 U/L       5-50                      



             1742-6)                                             

 

             AST(SGOT) (test code = 21 U/L       13-40                     



             1119026677)                                         

 

             eGFR (test code =              mL/min/1.73m2              



             9251501151)                                         

 

             MAL (test code = MAL) Association of                           



                          Glomerular Filtration                           



                          Rate (GFR) and Staging                           



                          of Kidney Disease*                           



                          +---------------------                           



                          --+-------------------                           



                          --+-------------------                           



                          ------+| GFR                           



                          (mL/min/1.73 m2) ?|                           



                          With Kidney Damage ?|                           



                          ?Without Kidney                           



                          Damage+---------------                           



                          --------+-------------                           



                          --------+-------------                           



                          ------------+| ?>90 ?                           



                          ? ? ? ? ? ? ? ?|                           



                          ?Stage one ? ? ? ? ?|                           



                          ? Normal ? ? ? ? ? ? ?                           



                          ?+--------------------                           



                          ---+------------------                           



                          ---+------------------                           



                          -------+| ?60-89 ? ? ?                           



                          ? ? ? ? ?| ?Stage two                           



                          ? ? ? ? ?| ? Decreased                           



                          GFR ? ? ? ?                            



                          +---------------------                           



                          --+-------------------                           



                          --+-------------------                           



                          ------+| ?30-59 ? ? ?                           



                          ? ? ? ? ?| ?Stage                           



                          three ? ? ? ?| ? Stage                           



                          three ? ? ? ? ?                           



                          +---------------------                           



                          --+-------------------                           



                          --+-------------------                           



                          ------+| ?15-29 ? ? ?                           



                          ? ? ? ? ?| ?Stage four                           



                          ? ? ? ? | ? Stage four                           



                          ? ? ? ? ?                              



                          ?+--------------------                           



                          ---+------------------                           



                          ---+------------------                           



                          -------+| ?<15 (or                           



                          dialysis) ? ?| ?Stage                           



                          five ? ? ? ? | ? Stage                           



                          five ? ? ? ? ?                           



                          ?+--------------------                           



                          ---+------------------                           



                          ---+------------------                           



                          -------+ *Each stage                           



                          assumes the associated                           



                          GFR level has been in                           



                          effect for at least                           



                          three months. ?Stages                           



                          1 to 5, with or                           



                          without kidney                           



                          disease, indicate                           



                          chronic kidney                           



                          disease. Notes:                           



                          Determination of                           



                          stages one and two                           



                          (with eGFR                             



                          >59mL/min/1.73 m2)                           



                          requires estimation of                           



                          kidney damage for at                           



                          least three months as                           



                          defined by structural                           



                          or functional                           



                          abnormalities of the                           



                          kidney, manifested by                           



                          either:Pathological                           



                          abnormalities or                           



                          Markers of kidney                           



                          damage (including                           



                          abnormalities in the                           



                          composition of the                           



                          blood or urine or                           



                          abnormalities in                           



                          imaging tests).                           

 

             Lab Interpretation Abnormal                               



             (test code = 82278-7)                                        



[facility] WITH [id number] 22:51:57





             Test Item    Value        Reference Range Interpretation Comments

 

             WBC (test code =              See_Comment  H             [Automated



             6690-2)                                             message] The sy

stem



                                                                 which generated



                                                                 this result



                                                                 transmitted



                                                                 reference range

:



                                                                 4.20 - 10.70



                                                                 10*3/?L. The



                                                                 reference range

 was



                                                                 not used to



                                                                 interpret this



                                                                 result as



                                                                 normal/abnormal

.

 

             RBC (test code =              See_Comment                [Automated



             789-8)                                              message] The sy

stem



                                                                 which generated



                                                                 this result



                                                                 transmitted



                                                                 reference range

:



                                                                 4.26 - 5.52



                                                                 10*6/?L. The



                                                                 reference range

 was



                                                                 not used to



                                                                 interpret this



                                                                 result as



                                                                 normal/abnormal

.

 

             HGB (test code = 14.7 g/dL    12.2-16.4                 



             718-7)                                              

 

             HCT (test code = 43.7 %       38.4-49.3                 



             4544-3)                                             

 

             MCV (test code = 85.9 fL      81.7-95.6                 



             787-2)                                              

 

             MCH (test code = 28.9 pg      26.1-32.7                 



             785-6)                                              

 

             MCHC (test code = 33.6 g/dL    31.2-35.0                 



             786-4)                                              

 

             RDW-SD (test code = 42.4 fL      38.5-51.6                 



             68296-3)                                            

 

             RDW-CV (test code = 13.6 %       12.1-15.4                 



             788-0)                                              

 

             PLT (test code =              See_Comment  H             [Automated



             777-3)                                              message] The sy

stem



                                                                 which generated



                                                                 this result



                                                                 transmitted



                                                                 reference range

:



                                                                 150 - 328 10*3/

?L.



                                                                 The reference r

zhen



                                                                 was not used to



                                                                 interpret this



                                                                 result as



                                                                 normal/abnormal

.

 

             MPV (test code = 9.4 fL       9.8-13.0     L            



             55304-4)                                            

 

             NRBC/100 WBC (test              See_Comment                [Automat

ed



             code = 3661464105)                                        message] 

The system



                                                                 which generated



                                                                 this result



                                                                 transmitted



                                                                 reference range

:



                                                                 0.0 - 10.0 /100



                                                                 WBCs. The refer

ence



                                                                 range was not u

sed



                                                                 to interpret th

is



                                                                 result as



                                                                 normal/abnormal

.

 

             NRBC x10^3 (test code <0.01        See_Comment                [Auto

mated



             = [id number])                                        message] The s

ystem



                                                                 which generated



                                                                 this result



                                                                 transmitted



                                                                 reference range

:



                                                                 10*3/?L. The



                                                                 reference range

 was



                                                                 not used to



                                                                 interpret this



                                                                 result as



                                                                 normal/abnormal

.

 

             GRAN MAT (NEUT) % 76.4 %                                 



             (test code = 770-8)                                        

 

             IMM GRAN % (test code 0.40 %                                 



             = [id number])                                        

 

             LYMPH % (test code = 16.3 %                                 



             736-9)                                              

 

             MONO % (test code = 5.6 %                                  



             5905-5)                                             

 

             EOS % (test code = 1.0 %                                  



             713-8)                                              

 

             BASO % (test code = 0.3 %                                  



             706-2)                                              

 

             GRAN MAT x10^3(ANC) 8.81 10*3/uL 1.99-6.95    H            



             (test code =                                        



             7894299221)                                         

 

             IMM GRAN x10^3 (test 0.05 10*3/uL 0.00-0.06                 



             code = 0819140111)                                        

 

             LYMPH x10^3 (test code 1.88 10*3/uL 1.09-3.23                 



             = 731-0)                                            

 

             MONO x10^3 (test code 0.64 10*3/uL 0.36-1.02                 



             = 742-7)                                            

 

             EOS x10^3 (test code = 0.12 10*3/uL 0.06-0.53                 



             711-2)                                              

 

             BASO x10^3 (test code 0.03 10*3/uL 0.01-0.09                 



             = 704-7)                                            

 

             Lab Interpretation Abnormal                               



             (test code = 15269-6)                                        



[facility] 
01:01:33





             Test Item    Value        Reference Range Interpretation Comments

 

             SARS-CoV-2 Rapid ID NOW Not Detected Not Detected              



             (test code = 54396-9)                                        

 

             MAL (test code = MAL) ID NOW COVID-19 Assay                        

   



                          is an isothermal                           



                          nucleic acid                           



                          amplification test                           



                          intended for the                           



                          qualitative detection                           



                          of nucleic acid from                           



                          SARS-CoV-2 viral RNA                           



                          in nasopharyngeal (NP)                           



                          specimens. It is used                           



                          under Emergency Use                           



                          Authorization (EUA) by                           



                          FDA. The limit of                           



                          detection (LOD) of the                           



                          assay is 125 Genome                           



                          Equivalents/mL. A                           



                          positive result is                           



                          indicative of the                           



                          presence of SARS-CoV-2                           



                          RNA. ?Clinical                           



                          correlation with                           



                          patient history and                           



                          other diagnostic                           



                          information is                           



                          necessary to determine                           



                          patient infection                           



                          status. A negative                           



                          (Not Detected) result                           



                          does not preclude                           



                          SARS-CoV-2 infection.                           



                          In patients with                           



                          clinical symptoms and                           



                          other tests that are                           



                          consistent with                           



                          SARS-CoV-2 infection,                           



                          negative results                           



                          should be treated as                           



                          presumptive negative                           



                          and a new specimen                           



                          should be tested with                           



                          alternative PCR                           



                          molecular test.                           



                          Invalid: Please                           



                          collect a new specimen                           



                          for repeat patient                           



                          testing if clinically                           



                          indicated.                             

 

             Lab Interpretation Normal                                 



             (test code = 21946-3)                                        



[facility]. METABOLIC PANEL (30400)2021-05-10 
01:00:33





             Test Item    Value        Reference Range Interpretation Comments

 

             NA (test code = 140 mmol/L   135-145                   



             7446263235)                                         

 

             K (test code = 4.4 mmol/L   3.5-5.0                   



             8357050097)                                         

 

             CL (test code = 103 mmol/L                       



             9867131131)                                         

 

             CO2 TOTAL (test code = 28 mmol/L    23-31                     



             3894039226)                                         

 

             AGAP (test code =              2-16                      



             5072926900)                                         

 

             BUN (test code = 15 mg/dL     7-23                      



             2895872557)                                         

 

             GLUCOSE (test code = 85 mg/dL                         



             4658080412)                                         

 

             CREATININE (test code = 0.60 mg/dL   0.60-1.25                 



             8144165680)                                         

 

             TOTAL BILI (test code = 1.1 mg/dL    0.1-1.1                   



             5807893488)                                         

 

             CALCIUM (test code = 9.0 mg/dL    8.6-10.6                  



             3786651675)                                         

 

             T PROTEIN (test code = 7.8 g/dL     6.3-8.2                   



             3367994552)                                         

 

             ALBUMIN (test code = 4.0 g/dL     3.5-5.0                   



             8010369110)                                         

 

             ALK PHOS (test code = 166 U/L             H            



             8920605962)                                         

 

             ALTv (test code = 42 U/L       5-50                      



             1742-6)                                             

 

             AST(SGOT) (test code = 32 U/L       13-40                     



             1663928893)                                         

 

             eGFR (test code =              mL/min/1.73m2              



             3684787755)                                         

 

             MAL (test code = AML) Association of                           



                          Glomerular Filtration                           



                          Rate (GFR) and Staging                           



                          of Kidney Disease*                           



                          +---------------------                           



                          --+-------------------                           



                          --+-------------------                           



                          ------+| GFR                           



                          (mL/min/1.73 m2) ?|                           



                          With Kidney Damage ?|                           



                          ?Without Kidney                           



                          Damage+---------------                           



                          --------+-------------                           



                          --------+-------------                           



                          ------------+| ?>90 ?                           



                          ? ? ? ? ? ? ? ?|                           



                          ?Stage one ? ? ? ? ?|                           



                          ? Normal ? ? ? ? ? ? ?                           



                          ?+--------------------                           



                          ---+------------------                           



                          ---+------------------                           



                          -------+| ?60-89 ? ? ?                           



                          ? ? ? ? ?| ?Stage two                           



                          ? ? ? ? ?| ? Decreased                           



                          GFR ? ? ? ?                            



                          +---------------------                           



                          --+-------------------                           



                          --+-------------------                           



                          ------+| ?30-59 ? ? ?                           



                          ? ? ? ? ?| ?Stage                           



                          three ? ? ? ?| ? Stage                           



                          three ? ? ? ? ?                           



                          +---------------------                           



                          --+-------------------                           



                          --+-------------------                           



                          ------+| ?15-29 ? ? ?                           



                          ? ? ? ? ?| ?Stage four                           



                          ? ? ? ? | ? Stage four                           



                          ? ? ? ? ?                              



                          ?+--------------------                           



                          ---+------------------                           



                          ---+------------------                           



                          -------+| ?<15 (or                           



                          dialysis) ? ?| ?Stage                           



                          five ? ? ? ? | ? Stage                           



                          five ? ? ? ? ?                           



                          ?+--------------------                           



                          ---+------------------                           



                          ---+------------------                           



                          -------+ *Each stage                           



                          assumes the associated                           



                          GFR level has been in                           



                          effect for at least                           



                          three months. ?Stages                           



                          1 to 5, with or                           



                          without kidney                           



                          disease, indicate                           



                          chronic kidney                           



                          disease. Notes:                           



                          Determination of                           



                          stages one and two                           



                          (with eGFR                             



                          >59mL/min/1.73 m2)                           



                          requires estimation of                           



                          kidney damage for at                           



                          least three months as                           



                          defined by structural                           



                          or functional                           



                          abnormalities of the                           



                          kidney, manifested by                           



                          either:Pathological                           



                          abnormalities or                           



                          Markers of kidney                           



                          damage (including                           



                          abnormalities in the                           



                          composition of the                           



                          blood or urine or                           



                          abnormalities in                           



                          imaging tests).                           

 

             Lab Interpretation Abnormal                               



             (test code = 85419-5)                                        



[facility]





             Test Item    Value        Reference Range Interpretation Comments

 

             LIPASE (test code = 7570832789) 57 U/L       0-220                 

    

 

             Lab Interpretation (test code = Normal                             

    



             44574-1)                                            



[facility]





             Test Item    Value        Reference Range Interpretation Comments

 

             APPEARANCE (test code = Turbid       Clear        A            



             5807014583)                                         

 

             COLOR (test code = Yellow       Yellow                    



             7494067473)                                         

 

             PH (test code =              4.8-8.0                   



             6290218809)                                         

 

             SP GRAVITY (test code =              1.003-1.030               



             2225527651)                                         

 

             GLU U QUAL (test code = Normal       Normal                    



             0547210329)                                         

 

             BLOOD (test code = 1+           Negative     A            



             8405227637)                                         

 

             KETONES (test code = 20 mg/dL     Negative     A            



             4307825428)                                         

 

             PROTEIN (test code = 100 mg/dL    Negative     A            



             2887-8)                                             

 

             UROBILIN (test code = 2.0 mg/dL    Normal       A            



             6978095866)                                         

 

             BILIRUBIN (test code = Negative     Negative                  



             2611747772)                                         

 

             NITRITE (test code = Positive     Negative     A            



             6423502334)                                         

 

             LEUK FRANCE (test code = 250/uL       Negative     A            



             8898051140)                                         

 

             RBC/HPF (test code =              See_Comment  H             [Autom

ated message]



             2261465967)                                         The system [employer]



                                                                 generated this



                                                                 result transmit

huma



                                                                 reference range

: 0 -



                                                                 3 HPF. The refe

rence



                                                                 range was not u

sed



                                                                 to interpret th

is



                                                                 result as



                                                                 normal/abnormal

.

 

             WBC/HPF (test code = >182         See_Comment  H             [Autom

ated message]



             6881109553)                                         The system [employer]



                                                                 generated this



                                                                 result transmit

huma



                                                                 reference range

: 0 -



                                                                 5 HPF. The refe

rence



                                                                 range was not u

sed



                                                                 to interpret th

is



                                                                 result as



                                                                 normal/abnormal

.

 

             BACTERIA (test code = Many         Negative     A            



             8486599554)                                         

 

             MUCOUS (test code = Moderate     Negative LPF A            



             0177312353)                                         

 

             WBC CLUMPS (test code =              See_Comment  H             [Au

tomated message]



             3979406175)                                         The system [employer]



                                                                 generated this



                                                                 result transmit

huma



                                                                 reference range

: <=1



                                                                 HPF. The refere

nce



                                                                 range was not u

sed



                                                                 to interpret th

is



                                                                 result as



                                                                 normal/abnormal

.

 

             Lab Interpretation (test Abnormal                               



             code = 15689-8)                                        



[facility] WITH DIFF2021-05-10 00:46:14





             Test Item    Value        Reference Range Interpretation Comments

 

             WBC (test code =              See_Comment                [Automated



             6690-2)                                             message] The sy

stem



                                                                 which generated



                                                                 this result



                                                                 transmitted



                                                                 reference range

:



                                                                 4.20 - 10.70



                                                                 10*3/?L. The



                                                                 reference range

 was



                                                                 not used to



                                                                 interpret this



                                                                 result as



                                                                 normal/abnormal

.

 

             RBC (test code =              See_Comment                [Automated



             789-8)                                              message] The sy

stem



                                                                 which generated



                                                                 this result



                                                                 transmitted



                                                                 reference range

:



                                                                 4.26 - 5.52



                                                                 10*6/?L. The



                                                                 reference range

 was



                                                                 not used to



                                                                 interpret this



                                                                 result as



                                                                 normal/abnormal

.

 

             HGB (test code = 15.9 g/dL    12.2-16.4                 



             718-7)                                              

 

             HCT (test code = 47.1 %       38.4-49.3                 



             4544-3)                                             

 

             MCV (test code = 86.6 fL      81.7-95.6                 



             787-2)                                              

 

             MCH (test code = 29.2 pg      26.1-32.7                 



             785-6)                                              

 

             MCHC (test code = 33.8 g/dL    31.2-35.0                 



             786-4)                                              

 

             RDW-SD (test code = 47.6 fL      38.5-51.6                 



             83118-9)                                            

 

             RDW-CV (test code = 15.2 %       12.1-15.4                 



             788-0)                                              

 

             PLT (test code =              See_Comment  H             [Automated



             777-3)                                              message] The sy

stem



                                                                 which generated



                                                                 this result



                                                                 transmitted



                                                                 reference range

:



                                                                 150 - 328 10*3/

?L.



                                                                 The reference r

zhen



                                                                 was not used to



                                                                 interpret this



                                                                 result as



                                                                 normal/abnormal

.

 

             MPV (test code = 9.4 fL       9.8-13.0     L            



             17218-1)                                            

 

             NRBC/100 WBC (test              See_Comment                [Automat

ed



             code = 2951798279)                                        message] 

The system



                                                                 which generated



                                                                 this result



                                                                 transmitted



                                                                 reference range

:



                                                                 0.0 - 10.0 /100



                                                                 WBCs. The refer

ence



                                                                 range was not u

sed



                                                                 to interpret th

is



                                                                 result as



                                                                 normal/abnormal

.

 

             NRBC x10^3 (test code <0.01        See_Comment                [Auto

mated



             = [id number])                                        message] The s

ystem



                                                                 which generated



                                                                 this result



                                                                 transmitted



                                                                 reference range

:



                                                                 10*3/?L. The



                                                                 reference range

 was



                                                                 not used to



                                                                 interpret this



                                                                 result as



                                                                 normal/abnormal

.

 

             GRAN MAT (NEUT) % 61.3 %                                 



             (test code = 770-8)                                        

 

             IMM GRAN % (test code 0.70 %                                 



             = [id number])                                        

 

             LYMPH % (test code = 26.4 %                                 



             736-9)                                              

 

             MONO % (test code = 9.9 %                                  



             5905-5)                                             

 

             EOS % (test code = 1.3 %                                  



             713-8)                                              

 

             BASO % (test code = 0.4 %                                  



             706-2)                                              

 

             GRAN MAT x10^3(ANC) 6.39 10*3/uL 1.99-6.95                 



             (test code =                                        



             4900876352)                                         

 

             IMM GRAN x10^3 (test 0.07 10*3/uL 0.00-0.06    H            



             code = 1130481805)                                        

 

             LYMPH x10^3 (test code 2.75 10*3/uL 1.09-3.23                 



             = 731-0)                                            

 

             MONO x10^3 (test code 1.03 10*3/uL 0.36-1.02    H            



             = 742-7)                                            

 

             EOS x10^3 (test code = 0.14 10*3/uL 0.06-0.53                 



             711-2)                                              

 

             BASO x10^3 (test code 0.04 10*3/uL 0.01-0.09                 



             = 704-7)                                            

 

             Lab Interpretation Abnormal                               



             (test code = 22092-2)                                        



[id number]00





             Test Item    Value        Reference Range Interpretation Comments

 

             POC-GLUCOSE METER 140 mg/dL           H            : TESTED A

T [facility] [address]



             (BEAKER) (test code =                                        [facility],



             [facility])                                               [address]:



                                                                 /Techni

christina ID



                                                                 = [id number] for ANANT CATES



POCT-GLUCOSE [id number] 09:15:00





             Test Item    Value        Reference Range Interpretation Comments

 

             POC-GLUCOSE METER 142 mg/dL           H            : TESTED A

T [facility] [address]



             (BEAKER) (test code =                                        [facility],



             [facility])                                               [address]:



                                                                 /Techni

christina ID



                                                                 = [id number] for ANANT CATES



POCT-GLUCOSE METER2020-01-15 20:56:00





             Test Item    Value        Reference Range Interpretation Comments

 

             POC-GLUCOSE METER 134 mg/dL           H            : TESTED A

T [facility] [address]



             (BEAKER) (test code =                                        [facility],



             [facility])                                               [address]:



                                                                 /Techni

christina ID



                                                                 = [id number] for SHERRILL GUARDADO



POCT-GLUCOSE METER2020-01-15 16:46:00





             Test Item    Value        Reference Range Interpretation Comments

 

             POC-GLUCOSE METER 91 mg/dL                         : TESTED A

T [facility] [address]



             (BEAKER) (test code =                                        [facility],



             [facility])                                               [address]:



                                                                 /Techni

christina ID =



                                                                 [id number] for ANANT HAMILTON



POCT-GLUCOSE METER2020-01-15 12:19:00





             Test Item    Value        Reference Range Interpretation Comments

 

             POC-GLUCOSE METER 237 mg/dL           H            : TESTED A

T [facility] [address]



             (BEAKER) (test code =                                        [facility],



             [facility])                                               [address]:



                                                                 /Techni

chrisitna ID



                                                                 = [id number] for ANANT CATES



MR, EXTREMITY, LOWER, WITHOUT CONTRAST, RIGHT2020-01-15 09:12:00FINAL REPORT 
PATIENT ID:   [id number] MRI of the right and left hindfoot without intravenous 
contrast History: Osteomyelitis, diabetic foot Comparison: Radiograph dated 
2020 Technique: Multiplanar multisequence MRI of the right and left 
hindfoot was performed without intravenous contrast using the hindfoot 
osteomyelitis protocol Findings: Right foot: Marked T1 and T2 hypointense soft 
tissue thickening is noted at the medial aspect of the right heel, likely to 
reflect scar tissue. There is also mild subcutaneous edema at the lateral aspect
of the mid foot and forefoot, incompletely imaged. No discrete drainable fluid 
collection is identified. There is no marrow signal abnormality to suggest 
osteomyelitis. There is a small nonspecific joint effusion at the ankle and 
subtalar joint. Scattered degenerative changes are noted. There is marked fatty 
atrophy of the distal leg and foot musculature. Severe flexor hallucis longus 
tendinopathy. No tenosynovitis. Left foot: There is a large area ofsoft tissue 
defect and granulation tissue at the posteromedial aspect of the heel, reaching 
the calcaneus, but there is no drainable fluid collection. Deformity is noted in
the hindfoot, and the forefoot appears adducted. No acute fracture. No marrow 
signal abnormality is identified to indicate acute osteomyelitis. There is no 
significant joint effusion. There is severe atrophy of the foot and distalleg 
musculature. No significant tenosynovitis identified. IMPRESSION: 
Granulation/scar tissue at themedial aspect of the heel bilaterally. No MR 
evidence of osteomyelitis. Severe muscle atrophy. Signed: Jorge Cornejoort 
Verified Date/Time:  01/15/2020 09:12:01 Reading Location: [facility] C013X Ortho 
Consult Reading Room        Electronically signed by: JORGE CORNEJO M.D. on 
01/15/2020 09:12 AMMR, EXTREMITY, LOWER, WITHOUT CONTRAST, LEFT2020-01-15 
09:12:00FINAL REPORT PATIENT ID:   [id number] MRI of the right and left hindfoot 
without intravenous contrast History: Osteomyelitis, diabetic foot Comparison: 
Radiograph dated 2020 Technique: Multiplanar multisequence MRI of the
right and left hindfoot was performed without intravenous contrast using the 
hindfoot osteomyelitis protocol Findings: Right foot: Marked T1 and T2 
hypointense soft tissue thickening is noted at the medial aspect of the right 
heel, likely to reflect scar tissue. There is also mild subcutaneous edema at 
the lateral aspect of the mid foot and forefoot, incompletely imaged. No 
discrete drainable fluid collection is identified. There is no marrow signal 
abnormality to suggest osteomyelitis. There is a small nonspecific joint 
effusion at the ankle and subtalar joint. Scattered degenerative changes are 
noted. There is marked fatty atrophy of the distal leg and foot musculature. 
Severe flexor hallucis longus tendinopathy. No tenosynovitis. Left foot: There 
is a large area ofsoft tissue defect and granulation tissue at the posteromedial
aspect of the heel, reaching the calcaneus, but there is no drainable fluid 
collection. Deformity is noted in the hindfoot, and the forefoot appears 
adducted. No acute fracture. No marrow signal abnormality is identified to 
indicate acute osteomyelitis. There is no significant joint effusion. There is 
severe atrophy of the foot and distalleg musculature. No significant 
tenosynovitis identified. IMPRESSION: Granulation/scar tissue at themedial 
aspect of the heel bilaterally. No MR evidence of osteomyelitis. Severe muscle 
atrophy. Signed: Jorge Cornejo Verified Date/Time:  01/15/2020 09:12:01 
Reading Location: [facility] B1 C013X Ortho Consult Reading Room        Electronically 
signed by: JORGE CORNEJO M.D. on 01/15/2020 09:12 AMPOCT-GLUCOSE METER2020-01-15 
08:47:00





             Test Item    Value        Reference Range Interpretation Comments

 

             POC-GLUCOSE METER 264 mg/dL           H            : TESTED A

T [facility] [address]



             (BEAKER) (test code =                                        [facility]

[address],



             1538)                                               [address]:



                                                                 /Techni

christina ID



                                                                 = [id number] for ANANT CATES



ISLET CELL AB SCR2020-01-15 07:43:00





             Test Item    Value        Reference Range Interpretation Comments

 

             ISLET CELL AB Refer to individual                           



             AUTOVERIFICATION (test Islet Cell Ab                           



             code = 2556) and/or Islet Cell                           



                          Ab Titer results.                           



POCT-GLUCOSE [id number] 21:27:00





             Test Item    Value        Reference Range Interpretation Comments

 

             POC-GLUCOSE METER 130 mg/dL           H            : TESTED A

T [facility] [address]



             (BEAKER) (test code =                                        [facility]

[address],



             1538)                                               [address]:



                                                                 /Techni

christina ID



                                                                 = [id number] for MARCELL PIERREY



BLOOD [id number] 13:00:00





             Test Item    Value        Reference Range Interpretation Comments

 

             CULTURE (BEAKER) (test No growth in 5 days                         

  



             code = 1095)                                        



BLOOD [id number] 13:00:00





             Test Item    Value        Reference Range Interpretation Comments

 

             CULTURE (BEAKER) (test No growth in 5 days                         

  



             code = 1095)                                        



POCT-GLUCOSE [id number] 12:57:00





             Test Item    Value        Reference Range Interpretation Comments

 

             POC-GLUCOSE METER 138 mg/dL           H            : TESTED A

T [facility] [address]



             (BEAKER) (test code =                                        [facility],



             1538)                                               [address]:



                                                                 /Techni

christina ID



                                                                 = [id number] for [place]

LLIS,



                                                                 BARBARA



POCT-GLUCOSE [id number] 08:43:00





             Test Item    Value        Reference Range Interpretation Comments

 

             POC-GLUCOSE METER 226 mg/dL           H            : TESTED A

[facility]



             ([facility]) (test code =                                        [facility],



             153)                                               [address]:



                                                                 /Techni

christina ID



                                                                 = [id number] for [place]

LLIS,



                                                                 BARBARA



POCT-GLUCOSE [id number] 21:11:00





             Test Item    Value        Reference Range Interpretation Comments

 

             POC-GLUCOSE METER 254 mg/dL           H            : Notified

 RN/MD:



             ([facility]) (test code =                                        TESTED

 AT [place] [id number]



             153)                                               [facility],



                                                                 [address]:



                                                                 /Techni

christina ID



                                                                 = [id number] for KRANTHI PIERRE



POCT-GLUCOSE [id number] 21:02:00





             Test Item    Value        Reference Range Interpretation Comments

 

             POC-GLUCOSE METER 177 mg/dL           H            : TESTED A

[facility]



             ([facility]) (test code =                                        [facility],



             153)                                               [address]:



                                                                 /Techni

christina ID



                                                                 = [id number] for [place]

LLIS,



                                                                 BARBARA



POCT-GLUCOSE [id number] 12:31:00





             Test Item    Value        Reference Range Interpretation Comments

 

             POC-GLUCOSE METER 215 mg/dL           H            : TESTED A

[facility]



             ([facility]) (test code =                                        [facility],



             153)                                               [address]:



                                                                 /Techni

christina ID



                                                                 = [id number] for [place]

LLIS,



                                                                 BARBARA



WOUND CULTURE + GRAM [id number] 10:10:00





             Test Item    Value        Reference    Interpretation Comments



                                       Range                     

 

             CULTURE ([facility]) PROTEUS MIRABILIS              A            1+ Pro

teus



             (test code = 1095)                                        mirabilis

 

             Amikacin (test code =                           S            



             1)                                                  

 

             Ampicillin +                           S            



             Sulbactam (test code                                        



             = 6)                                                

 

             Aztreonam (test code                           S            



             = 32)                                               

 

             Cefepime (test code =                           S            



             51)                                                 

 

             Cefoxitin (test code                           R            



             = 68)                                               

 

             Ceftazidime (test                           S            



             code = 27)                                          

 

             Ceftriaxone (test                           S            



             code = 52)                                          

 

             Ertapenem (test code                           S            



             = 38)                                               

 

             Gentamicin (test code                           S            



             = 18)                                               

 

             Levofloxacin (test                           R            



             code = 22)                                          

 

             Meropenem (test code                           S            



             = 34)                                               

 

             Piperacillin +                           S            



             Tazobactam (test code                                        



             = 29)                                               

 

             Tetracycline (test                           R            



             code = 2)                                           

 

             Tobramycin (test code                           S            



             = 25)                                               

 

             Trimethoprim +                           R            



             Sulfamethoxazole                                        



             (test code = 47)                                        

 

             CULTURE (BEAKER) METHICILLIN               A            1+ Methicil

bryn



             (test code = 1095) RESISTANT                              resistant



                          STAPHYLOCOCCUS                           Staphylococcu

s



                          AUREUS                                 aureus

 

             Clindamycin (test                           R            



             code = 10)                                          

 

             Erythromycin (test                           R            



             code = 4)                                           

 

             Linezolid (test code                           S            



             = 40)                                               

 

             Nitrofurantoin (test                           S            



             code = 23)                                          

 

             Oxacillin (test code                           R            



             = 14)                                               

 

             Rifampin (test code =                           S            



             43)                                                 

 

             Tetracycline (test                           S            



             code = 2)                                           

 

             Trimethoprim +                           S            



             Sulfamethoxazole                                        



             (test code = 47)                                        

 

             Vancomycin (test code                           S            



             = 13)                                               

 

             CULTURE (BEAKER) ESCHERICHIA COLI              A            <1+ Esc

herichia



             (test code = 1095)                                        coliESBL 

Positive

 

             Amikacin (test code =                           S            



             1)                                                  

 

             Ampicillin +                           R            



             Sulbactam (test code                                        



             = 6)                                                

 

             Aztreonam (test code                           R            



             = 32)                                               

 

             Cefepime (test code =                           R            



             51)                                                 

 

             Cefoxitin (test code                           R            



             = 68)                                               

 

             Ceftazidime (test                           R            



             code = 27)                                          

 

             Ceftriaxone (test                           R            



             code = 52)                                          

 

             Ertapenem (test code                           S            



             = 38)                                               

 

             Gentamicin (test code                           S            



             = 18)                                               

 

             Levofloxacin (test                           R            



             code = 22)                                          

 

             Meropenem (test code                           S            



             = 34)                                               

 

             Nitrofurantoin (test                           S            



             code = 23)                                          

 

             Piperacillin +                           R            



             Tazobactam (test code                                        



             = 29)                                               

 

             Tetracycline (test                           S            



             code = 2)                                           

 

             Tobramycin (test code                           S            



             = 25)                                               

 

             Trimethoprim +                           R            



             Sulfamethoxazole                                        



             (test code = 47)                                        

 

             CULTURE (BEAKER)                           A            Beta-hemoly

tic



             (test code = 1095)                                        streptoco

ccus



                                                                 group B, by



                                                                 serological



                                                                 grouping

 

             GRAM STAIN RESULT 3+ WBCs                                



             (BEAKER) (test code =                                        



             1123)                                               

 

             GRAM STAIN RESULT 1+ gram negative                           



             (BEAKER) (test code = rods                                   



             587367)                                             

 

             GRAM STAIN RESULT 2+ gram positive                           



             (BEAKER) (test code = rods                                   



             685571)                                             

 

             GRAM STAIN RESULT <1+ gram negative                           



             (BEAKER) (test code = coccobacilli                           



             806427)                                             

 

             GRAM STAIN RESULT 2+ gram positive                           



             (BEAKER) (test code = cocci in pairs                           



             891709)                                             



POCT-GLUCOSE [id number] 08:52:00





             Test Item    Value        Reference Range Interpretation Comments

 

             POC-GLUCOSE METER 209 mg/dL           H            : TESTED A

T [facility] [address]



             (BEAKER) (test code =                                        [facility],



             153)                                               [address]:



                                                                 /Techni

christina ID



                                                                 = [id number] for WI

CRESENCIO,



                                                                 BARBARA



POCT-GLUCOSE [id number] 21:26:00





             Test Item    Value        Reference Range Interpretation Comments

 

             POC-GLUCOSE METER 255 mg/dL           H            : TESTED A

T [facility] [address]



             (BEAKER) (test code =                                        [facility],



             153)                                               [address]:



                                                                 /Techni

christina ID



                                                                 = [id number] for CR

ISWELL,



                                                                 TIA



POCT-GLUCOSE [id number] 17:34:00





             Test Item    Value        Reference Range Interpretation Comments

 

             POC-GLUCOSE METER 227 mg/dL           H            : TESTED A

T [facility] [address]



             (BEAKER) (test code =                                        [facility],



             [facility])                                               [address]:



                                                                 /Techni

christina ID



                                                                 = [id number] for WSASERMAN

NNY,



                                                                 NAKITA



POCT-GLUCOSE [id number] 11:28:00





             Test Item    Value        Reference Range Interpretation Comments

 

             POC-GLUCOSE METER 282 mg/dL           H            : TESTED A

T [facility] [address]



             (BEAKER) (test code =                                        [facility],



             [facility])                                               [address]:



                                                                 /Techni

christina ID



                                                                 = [id number] for WASSERMAN

NNY,



                                                                 NAKITA



POCT-GLUCOSE [id number] 08:19:00





             Test Item    Value        Reference Range Interpretation Comments

 

             POC-GLUCOSE METER 329 mg/dL           H            : TESTED A

T [facility] [address]



             (BEAKER) (test code =                                        [facility],



             [facility])                                               [address]:



                                                                 /Techni

christina ID



                                                                 = [id number] for ANANT CATES



POCT-GLUCOSE [id number] 21:32:00





             Test Item    Value        Reference Range Interpretation Comments

 

             POC-GLUCOSE METER 275 mg/dL           H            : TESTED A

T [facility] [address]



             (BEAKER) (test code =                                        [facility],



             [facility])                                               [address]:



                                                                 /Techni

christina ID



                                                                 = [id number] for CR

ISWELL,



                                                                 TIA



POCT-GLUCOSE [id number] 17:47:00





             Test Item    Value        Reference Range Interpretation Comments

 

             POC-GLUCOSE METER 304 mg/dL           H            : TESTED A

T [facility] [address]



             (IntenseDebateAKER) (test code =                                        [facility],



             1538)                                               [address]:



                                                                 /Techni

christina ID



                                                                 = [id number] for LUIZA FIGUEROA



URINE [id number] 15:21:00





             Test Item    Value        Reference Range Interpretation Comments

 

             CULTURE (BEAKER)                           A            >100,000 co

l/mL



             (test code = 1095)                                        Beta-hemo

lytic



                                                                 streptococcus g

roup



                                                                 B, by serologic

al



                                                                 grouping

 

             CULTURE (BEAKER) PROTEUS                   A            80-89,000 c

ol/mL



             (test code = 1095) MIRABILIS                              Proteus



                                                                 mirabilisESBL



                                                                 Positive

 

             Amikacin (test code =                           S            



             1)                                                  

 

             Ampicillin +                           R            



             Sulbactam (test code                                        



             = 6)                                                

 

             Aztreonam (test code                           R            



             = 32)                                               

 

             Cefepime (test code =                           R            



             51)                                                 

 

             Cefoxitin (test code                           R            



             = 68)                                               

 

             Ceftazidime (test                           R            



             code = 27)                                          

 

             Ceftriaxone (test                           R            



             code = 52)                                          

 

             Ertapenem (test code                           S            



             = 38)                                               

 

             Gentamicin (test code                           R            



             = 18)                                               

 

             Levofloxacin (test                           R            



             code = 22)                                          

 

             Meropenem (test code                           S            



             = 34)                                               

 

             Nitrofurantoin (test                           R            



             code = 23)                                          

 

             Tetracycline (test                           R            



             code = 2)                                           

 

             Tobramycin (test code                           R            



             = 25)                                               



POCT-GLUCOSE [id number] 12:16:00





             Test Item    Value        Reference Range Interpretation Comments

 

             POC-GLUCOSE METER 337 mg/dL           H            : TESTED A

T [facility] [address]



             (Tixers) (test code =                                        [facility],



             1538)                                               [address]:



                                                                 /Techni

christina ID



                                                                 = [id number] for LUIZA FIGUEROA



BASIC METABOLIC [id number] 10:39:00





             Test Item    Value        Reference Range Interpretation Comments

 

             SODIUM (BEAKER) 134 meq/L    136-145      L            



             (test code = 381)                                        

 

             POTASSIUM (BEAKER) 4.6 meq/L    3.5-5.1                   



             (test code = 379)                                        

 

             CHLORIDE (BEAKER) 105 meq/L                        



             (test code = 382)                                        

 

             CO2 (BEAKER) (test 20 meq/L     22-29        L            



             code = 355)                                         

 

             BLOOD UREA NITROGEN 14 mg/dL     7-21                      



             (BEAKER) (test code                                        



             = 354)                                              

 

             CREATININE (BEAKER) 0.97 mg/dL   0.57-1.25                 



             (test code = 358)                                        

 

             GLUCOSE RANDOM 429 mg/dL           HH           



             (BEAKER) (test code                                        



             = 652)                                              

 

             CALCIUM (BEAKER) 8.9 mg/dL    8.4-10.2                  



             (test code = 697)                                        

 

             EGFR (BEAKER) (test 107 mL/min/1.73                           ESTIM

ATED GFR IS



             code = 1092) sq m                                   NOT AS ACCURATE

 AS



                                                                 CREATININE



                                                                 CLEARANCE IN



                                                                 PREDICTING



                                                                 GLOMERULAR



                                                                 FILTRATION RATE

.



                                                                 ESTIMATED GFR I

S



                                                                 NOT APPLICABLE 

FOR



                                                                 DIALYSIS PATIEN

TS.



 ID - MAGAN FPOCT-GLUCOSE [id number] 08:29:00





             Test Item    Value        Reference Range Interpretation Comments

 

             POC-GLUCOSE METER 395 mg/dL           H            : TESTED A

T [facility] [address]



             (BEAKER) (test code =                                        [place],



             [address]38)                                               [address]:



                                                                 /Techni

christina ID



                                                                 = [id number] for LUIZA FIGUEROA



CBC W/PLT COUNT &amp; AUTO [id number] 06:40:00





             Test Item    Value        Reference Range Interpretation Comments

 

             WHITE BLOOD CELL COUNT (BEAKER) 7.2 K/ L     3.5-10.5              

    



             (test code = 775)                                        

 

             RED BLOOD CELL COUNT (BEAKER) 5.48 M/ L    4.63-6.08               

  



             (test code = 761)                                        

 

             HEMOGLOBIN (BEAKER) (test code = 15.1 GM/DL   13.7-17.5            

     



             410)                                                

 

             HEMATOCRIT (BEAKER) (test code = 46.4 %       40.1-51.0            

     



             411)                                                

 

             MEAN CORPUSCULAR VOLUME (BEAKER) 84.7 fL      79.0-92.2            

     



             (test code = 753)                                        

 

             MEAN CORPUSCULAR HEMOGLOBIN 27.6 pg      25.7-32.2                 



             (BEAKER) (test code = 751)                                        

 

             MEAN CORPUSCULAR HEMOGLOBIN CONC 32.5 GM/DL   32.3-36.5            

     



             (BEAKER) (test code = 752)                                        

 

             RED CELL DISTRIBUTION WIDTH 15.5 %       11.6-14.4    H            



             (BEAKER) (test code = 412)                                        

 

             PLATELET COUNT (BEAKER) (test 315 K/CU MM  150-450                 

  



             code = 756)                                         

 

             MEAN PLATELET VOLUME (BEAKER) 10.5 fL      9.4-12.4                

  



             (test code = 754)                                        

 

             NUCLEATED RED BLOOD CELLS 0 /100 WBC   0-0                       



             (BEAKER) (test code = 413)                                        

 

             NEUTROPHILS RELATIVE PERCENT 62 %                                  

 



             (BEAKER) (test code = 429)                                        

 

             LYMPHOCYTES RELATIVE PERCENT 26 %                                  

 



             (BEAKER) (test code = 430)                                        

 

             MONOCYTES RELATIVE PERCENT 9 %                                    



             (BEAKER) (test code = 431)                                        

 

             EOSINOPHILS RELATIVE PERCENT 2 %                                   

 



             (BEAKER) (test code = 432)                                        

 

             BASOPHILS RELATIVE PERCENT 0 %                                    



             (BEAKER) (test code = 437)                                        

 

             NEUTROPHILS ABSOLUTE COUNT 4.48 K/ L    1.78-5.38                 



             (BEAKER) (test code = 670)                                        

 

             LYMPHOCYTES ABSOLUTE COUNT 1.87 K/ L    1.32-3.57                 



             (BEAKER) (test code = 414)                                        

 

             MONOCYTES ABSOLUTE COUNT (BEAKER) 0.62 K/ L    0.30-0.82           

      



             (test code = 415)                                        

 

             EOSINOPHILS ABSOLUTE COUNT 0.17 K/ L    0.04-0.54                 



             (BEAKER) (test code = 416)                                        

 

             BASOPHILS ABSOLUTE COUNT (BEAKER) 0.03 K/ L    0.01-0.08           

      



             (test code = 417)                                        

 

             IMMATURE GRANULOCYTES-RELATIVE 1 %          0-1                    

   



             PERCENT (BEAKER) (test code =                                      

  



             2801)                                               



POCT-GLUCOSE METER2020-01-10 19:55:00





             Test Item    Value        Reference Range Interpretation Comments

 

             POC-GLUCOSE METER 369 mg/dL           H            : TESTED A

T [facility] [address]



             (BEAKER) (test code =                                        [facility],



             153)                                               [address]:



                                                                 /Techni

christina ID



                                                                 = [id number] for CR

SHERRILL GARCIA



POCT-GLUCOSE METER2020-01-10 16:45:00





             Test Item    Value        Reference Range Interpretation Comments

 

             POC-GLUCOSE METER 272 mg/dL           H            : TESTED A

T [facility] [address]



             (BEAKER) (test code =                                        [facility],



             153)                                               [address]:



                                                                 /Techni

christina ID



                                                                 = [id number] for TH

OMAS,



                                                                 SARAH



POCT-GLUCOSE METER2020-01-10 11:40:00





             Test Item    Value        Reference Range Interpretation Comments

 

             POC-GLUCOSE METER 277 mg/dL           H            : TESTED A

T [facility] [address]



             (BEAKER) (test code =                                        [facility],



             153)                                               [address]:



                                                                 /Techni

christina ID



                                                                 = [id number] for TH

OMAS,



                                                                 SARAH



BASIC METABOLIC [id number] 10:22:00





             Test Item    Value        Reference Range Interpretation Comments

 

             SODIUM (BEAKER) 132 meq/L    136-145      L            



             (test code = 381)                                        

 

             POTASSIUM (BEAKER) 4.4 meq/L    3.5-5.1                   



             (test code = 379)                                        

 

             CHLORIDE (BEAKER) 103 meq/L                        



             (test code = 382)                                        

 

             CO2 (BEAKER) (test 21 meq/L     22-29        L            



             code = 355)                                         

 

             BLOOD UREA NITROGEN 14 mg/dL     7-21                      



             (BEAKER) (test code                                        



             = 354)                                              

 

             CREATININE (BEAKER) 0.89 mg/dL   0.57-1.25                 



             (test code = 358)                                        

 

             GLUCOSE RANDOM 428 mg/dL           HH           



             (BEAKER) (test code                                        



             = 652)                                              

 

             CALCIUM (BEAKER) 9.2 mg/dL    8.4-10.2                  



             (test code = 697)                                        

 

             EGFR (BEAKER) (test 119 mL/min/1.73                           ESTIM

ATED GFR IS



             code = 1092) sq m                                   NOT AS ACCURATE

 AS



                                                                 CREATININE



                                                                 CLEARANCE IN



                                                                 PREDICTING



                                                                 GLOMERULAR



                                                                 FILTRATION RATE

.



                                                                 ESTIMATED GFR I

S



                                                                 NOT APPLICABLE 

FOR



                                                                 DIALYSIS PATIEN

TS.



 ID - NTPLIPID PANEL2020-01-10 10:17:00





             Test Item    Value        Reference Range Interpretation Comments

 

             TRIGLYCERIDES (BEAKER) (test code = 692 mg/dL                      

        



             540)                                                

 

             CHOLESTEROL (BEAKER) (test code = 196 mg/dL                        

      



             631)                                                

 

             HDL CHOLESTEROL (BEAKER) (test code 24 mg/dL                       

        



             = 976)                                              



Calculated LDL not valid if triglyceride &gt;400 mg/dLTriglyceride Reference 
Range:   Low Risk  &lt;150   Borderline    150-199   High Risk     200-499   
Very High Risk  &gt;=500Cholesterol Reference Range:   Low Risk         &lt;200 
 Borderline    200-239    High Risk        &gt;240HDL Cholesterol Reference 
Range:   Low Risk         &gt;=60   High Risk         &lt;40LDL Cholesterol 
ReferenceRange:   Optimal          &lt;100   Near Optimal  100-129   Borderline 
  130-159   High          160-189   Very High       &gt;=190    ID - [id number]
POCT-GLUCOSE METER2020-01-10 08:28:00





             Test Item    Value        Reference Range Interpretation Comments

 

             POC-GLUCOSE METER 388 mg/dL           H            : TESTED A

T [facility] [address]



             (BEAKER) (test code =                                        [place],



             1538)                                               [id number]:



                                                                 /Techni

christina ID



                                                                 = [id number] for ANANT CATES



HEMOGLOBIN A1C2020-01-10 07:54:00





             Test Item    Value        Reference Range Interpretation Comments

 

             HEMOGLOBIN A1C (BEAKER) (test code = 10.4 %       4.3-6.1      H   

         



             368)                                                



CBC W/PLT COUNT &amp; AUTO DIFFERENTIAL2020-01-10 05:56:00





             Test Item    Value        Reference Range Interpretation Comments

 

             WHITE BLOOD CELL COUNT (BEAKER) 6.5 K/ L     3.5-10.5              

    



             (test code = 775)                                        

 

             RED BLOOD CELL COUNT (BEAKER) 5.21 M/ L    4.63-6.08               

  



             (test code = 761)                                        

 

             HEMOGLOBIN (BEAKER) (test code = 14.6 GM/DL   13.7-17.5            

     



             410)                                                

 

             HEMATOCRIT (BEAKER) (test code = 44.3 %       40.1-51.0            

     



             411)                                                

 

             MEAN CORPUSCULAR VOLUME (BEAKER) 85.0 fL      79.0-92.2            

     



             (test code = 753)                                        

 

             MEAN CORPUSCULAR HEMOGLOBIN 28.0 pg      25.7-32.2                 



             (BEAKER) (test code = 751)                                        

 

             MEAN CORPUSCULAR HEMOGLOBIN CONC 33.0 GM/DL   32.3-36.5            

     



             (BEAKER) (test code = 752)                                        

 

             RED CELL DISTRIBUTION WIDTH 15.6 %       11.6-14.4    H            



             (BEAKER) (test code = 412)                                        

 

             PLATELET COUNT (BEAKER) (test 294 K/CU MM  150-450                 

  



             code = 756)                                         

 

             MEAN PLATELET VOLUME (BEAKER) 11.2 fL      9.4-12.4                

  



             (test code = 754)                                        

 

             NUCLEATED RED BLOOD CELLS 0 /100 WBC   0-0                       



             (BEAKER) (test code = 413)                                        

 

             NEUTROPHILS RELATIVE PERCENT 56 %                                  

 



             (BEAKER) (test code = 429)                                        

 

             LYMPHOCYTES RELATIVE PERCENT 31 %                                  

 



             (BEAKER) (test code = 430)                                        

 

             MONOCYTES RELATIVE PERCENT 10 %                                   



             (BEAKER) (test code = 431)                                        

 

             EOSINOPHILS RELATIVE PERCENT 2 %                                   

 



             (BEAKER) (test code = 432)                                        

 

             BASOPHILS RELATIVE PERCENT 1 %                                    



             (BEAKER) (test code = 437)                                        

 

             NEUTROPHILS ABSOLUTE COUNT 3.66 K/ L    1.78-5.38                 



             (BEAKER) (test code = 670)                                        

 

             LYMPHOCYTES ABSOLUTE COUNT 2.03 K/ L    1.32-3.57                 



             (BEAKER) (test code = 414)                                        

 

             MONOCYTES ABSOLUTE COUNT (BEAKER) 0.63 K/ L    0.30-0.82           

      



             (test code = 415)                                        

 

             EOSINOPHILS ABSOLUTE COUNT 0.15 K/ L    0.04-0.54                 



             ([facility]) (test code = 416)                                        

 

             BASOPHILS ABSOLUTE COUNT ([facility]) 0.03 K/ L    0.01-0.08           

      



             (test code = 417)                                        

 

             IMMATURE GRANULOCYTES-RELATIVE 1 %          0-1                    

   



             PERCENT ([facility]) (test code =                                      

  



             2801)                                               



POCT-GLUCOSE [id number] 23:47:00





             Test Item    Value        Reference Range Interpretation Comments

 

             POC-GLUCOSE METER 359 mg/dL           H            : Notified

 RN/MD:



             ([facility]) (test code =                                        TESTED

 AT [place] [id number]



             153)                                               [facility],



                                                                 [address]:



                                                                 /Techni

christina ID



                                                                 = [id number] for



                                                                 LATROBERT CASTRON

ICE



POCT-GLUCOSE [id number] 21:13:00





             Test Item    Value        Reference Range Interpretation Comments

 

             POC-GLUCOSE METER 315 mg/dL           H            : TESTED A

[facility]



             ([facility]) (test code =                                        [facility],



             153)                                               [address]:



                                                                 /Techni

christina ID



                                                                 = [id number] for Sm

ith,



                                                                 Nicki



POCT-GLUCOSE [id number] 21:13:00





             Test Item    Value        Reference Range Interpretation Comments

 

             POC-GLUCOSE METER 331 mg/dL           H            : TESTED A

[facility]



             ([facility]) (test code =                                        [facility],



             153)                                               [id number]:



                                                                 /Techni

christina ID



                                                                 = [id number] for RO

DGERS,



                                                                 DAVIDECA



POCT-GLUCOSE [id number] 10:30:00





             Test Item    Value        Reference Range Interpretation Comments

 

             POC-GLUCOSE METER 341 mg/dL           H            : TESTED A

T [facility] [address]



             ([facility]) (test code =                                        [facility],



             153)                                               [address]:



                                                                 /Techni

christina ID



                                                                 = [id number] for Sm

ith,



                                                                 Nicki



CBC W/PLT COUNT &amp; AUTO [id number] 08:48:00





             Test Item    Value        Reference Range Interpretation Comments

 

             WHITE BLOOD CELL COUNT ([facility]) 6.7 K/ L     3.5-10.5              

    



             (test code = 775)                                        

 

             RED BLOOD CELL COUNT ([facility]) 5.28 M/ L    4.63-6.08               

  



             (test code = 761)                                        

 

             HEMOGLOBIN (BEAKER) (test code = 14.6 GM/DL   13.7-17.5            

     



             410)                                                

 

             HEMATOCRIT (BEAKER) (test code = 44.9 %       40.1-51.0            

     



             411)                                                

 

             MEAN CORPUSCULAR VOLUME (BEAKER) 85.0 fL      79.0-92.2            

     



             (test code = 753)                                        

 

             MEAN CORPUSCULAR HEMOGLOBIN 27.7 pg      25.7-32.2                 



             (BEAKER) (test code = 751)                                        

 

             MEAN CORPUSCULAR HEMOGLOBIN CONC 32.5 GM/DL   32.3-36.5            

     



             (BEAKER) (test code = 752)                                        

 

             RED CELL DISTRIBUTION WIDTH 15.3 %       11.6-14.4    H            



             (BEAKER) (test code = 412)                                        

 

             PLATELET COUNT (BEAKER) (test 300 K/CU MM  150-450                 

  



             code = 756)                                         

 

             MEAN PLATELET VOLUME (BEAKER) 10.4 fL      9.4-12.4                

  



             (test code = 754)                                        

 

             NUCLEATED RED BLOOD CELLS 0 /100 WBC   0-0                       



             (BEAKER) (test code = 413)                                        



(MANUAL DIFFERENTIAL)2020 08:48:00





             Test Item    Value        Reference Range Interpretation Comments

 

             NEUTROPHILS - REL (DIFF) (BEAKER) 69 %                             

      



             (test code = 1359)                                        

 

             LYMPHOCYTES - REL (DIFF) (BEAKER) 25 %                             

      



             (test code = 1360)                                        

 

             MONOCYTES - REL (DIFF) (BEAKER) 3 %                                

    



             (test code = 1361)                                        

 

             EOSINOPHILS - REL (DIFF) (BEAKER) 3 %                              

      



             (test code = 1362)                                        

 

             BASOPHILS - REL (DIFF) (BEAKER) 0 %                                

    



             (test code = 1363)                                        

 

             NEUTROPHILS - ABS (DIFF) (BEAKER) 4.62 K/ L    1.80-8.00           

      



             (test code = 1365)                                        

 

             LYMPHOCYTES - ABS (DIFF) (BEAKER) 1.68 K/ L    1.48-4.50           

      



             (test code = 1366)                                        

 

             MONOCYTES - ABS (DIFF) (BEAKER) 0.20 K/ L    0.00-1.30             

    



             (test code = 1367)                                        

 

             EOSINOPHILS - ABS (DIFF) (BEAKER) 0.20 K/ L    0.00-0.50           

      



             (test code = 1368)                                        

 

             BASOPHILS - ABS (DIFF) (BEAKER) 0.00 K/ L    0.00-0.20             

    



             (test code = 1369)                                        

 

             TOTAL COUNTED (BEAKER) (test code = 100                            

        



             1351)                                               

 

             PLT MORPHOLOGY (BEAKER) (test code Normal                          

       



             = 486)                                              

 

             RBC MORPHOLOGY (BEAKER) (test code Normal                          

       



             = 762)                                              

 

             SMUDGE CELLS (BEAKER) (test code = Present                         

       



             1371)                                               



HEMOGLOBIN [id number] 06:39:00





             Test Item    Value        Reference Range Interpretation Comments

 

             HEMOGLOBIN A1C (BEAKER) (test code = 10.5 %       4.3-6.1      H   

         



             368)                                                



LIPID [id number] 04:57:00





             Test Item    Value        Reference Range Interpretation Comments

 

             TRIGLYCERIDES (BEAKER) 1251 mg/dL                             Speci

men moderately



             (test code = 540)                                        hemolyzed

 

             CHOLESTEROL (BEAKER) 215 mg/dL                              Specime

n moderately



             (test code = 631)                                        hemolyzed

 

             HDL CHOLESTEROL 22 mg/dL                               



             (BEAKER) (test code =                                        



             976)                                                



Calculated LDL not valid if triglyceride &gt;400 mg/dLTriglyceride Reference 
Range:   Low Risk  &lt;150   Borderline    150-199   High Risk     200-499   
Very High Risk  &gt;=500Cholesterol Reference Range:   Low Risk         &lt;200 
 Borderline    200-239    High Risk        &gt;240HDL Cholesterol Reference 
Range:   Low Risk         &gt;=60   High Risk         &lt;40LDL Cholesterol 
ReferenceRange:   Optimal          &lt;100   Near Optimal  100-129   Borderline 
  130-159   High          160-189   Very High       &gt;=190   Specimen 
moderately lipemicBASIC METABOLIC [id number] 04:56:00





             Test Item    Value        Reference Range Interpretation Comments

 

             SODIUM (BEAKER) 137 meq/L    136-145                   



             (test code = 381)                                        

 

             POTASSIUM (BEAKER) 4.6 meq/L    3.5-5.1                   Specimen 

moderately



             (test code = 379)                                        hemolyzed

 

             CHLORIDE (BEAKER) 106 meq/L                        



             (test code = 382)                                        

 

             CO2 (BEAKER) (test 20 meq/L     22-29        L            



             code = 355)                                         

 

             BLOOD UREA NITROGEN 12 mg/dL     7-21                      



             (BEAKER) (test code                                        



             = 354)                                              

 

             CREATININE (BEAKER) 0.95 mg/dL   0.57-1.25                 Specimen

 moderately



             (test code = 358)                                        hemolyzed

 

             GLUCOSE RANDOM 398 mg/dL           H            



             (BEAKER) (test code                                        



             = 652)                                              

 

             CALCIUM (BEAKER) 8.8 mg/dL    8.4-10.2                  



             (test code = 697)                                        

 

             EGFR (BEAKER) (test 110 mL/min/1.73                           ESTIM

ATED GFR IS



             code = 1092) sq m                                   NOT AS ACCURATE

 AS



                                                                 CREATININE



                                                                 CLEARANCE IN



                                                                 PREDICTING



                                                                 GLOMERULAR



                                                                 FILTRATION RATE

.



                                                                 ESTIMATED GFR I

S



                                                                 NOT APPLICABLE 

FOR



                                                                 DIALYSIS PATIEN

TS.



POCT-GLUCOSE [id number] 21:53:00





             Test Item    Value        Reference Range Interpretation Comments

 

             POC-GLUCOSE METER > mg/dL             HH           : Notified

 RN/MD: TESTED



             (BEAKER) (test code =                                        AT [facility] [address] [facility]



             1538)                                               [facility], [address]

[address]:



                                                                 /Techni

christina ID =



                                                                 [id number] for RODRIGO

IS, ZECHARIAH



U/S, ABDOMINAL, [id number] 19:54:00Reason for exam:-&gt;Evaluation of  
shunt and for possible cyst or pseudocyst Should this be performed at the 
bedside?-&gt;YesFINAL REPORT PATIENT ID:   [id number] Limited abdominal 
ultrasound. CLINICAL HISTORY: Evaluation of VPshunt for possible cyst or 
pseudocyst. COMPARISON STUDY: None available. FINDINGS: Sonographic assessment 
of the abdomen was performed assessing for fluid in the region of the patient's 
shunts. No fluid collections are seen. However, the study is limited by the 
patient's body habitus. CT scan would bemore sensitive. Signed: Madison Grewaleport Verified Date/Time:  2020 19:54:10 Reading Location: [address]
Consult Reading Room      Electronically signed by: MADISON GREWAL M.D. on 
2020 07:54 PMPOCT-GLUCOSE [id number] 19:34:00





             Test Item    Value        Reference Range Interpretation Comments

 

             POC-GLUCOSE METER 474 mg/dL           HH           : Notified

 RN/MD:



             (BEAKER) (test code =                                        TESTED

 AT [facility] [address]



             1538)                                               [facility],



                                                                 [address]:



                                                                 /Techni

christina ID



                                                                 = [id number] for LONA URIOSTEGUI



URINALYSIS W/ REFLEX URINE [id number] 17:48:00





             Test Item    Value        Reference Range Interpretation Comments

 

             COLOR (BEAKER) (test code = 470) Yellow                            

     

 

             CLARITY (BEAKER) (test code = Hazy                                 

  



             469)                                                

 

             SPECIFIC GRAVITY UA (BEAKER) 1.020        1.001-1.035              

 



             (test code = 468)                                        

 

             PH UA (BEAKER) (test code = 467) 6.0          5.0-8.0              

     

 

             PROTEIN UA (BEAKER) (test code = 20 mg/dL     Negative     A       

     



             464)                                                

 

             GLUCOSE UA (BEAKER) (test code = >1000 mg/dL  Negative     A       

     



             365)                                                

 

             KETONES UA (BEAKER) (test code = 40 mg/dL     Negative     A       

     



             371)                                                

 

             BILIRUBIN UA (BEAKER) (test code Negative     Negative             

     



             = 462)                                              

 

             BLOOD UA (BEAKER) (test code = Moderate     Negative     A         

   



             461)                                                

 

             NITRITE UA (BEAKER) (test code = Positive     Negative     A       

     



             465)                                                

 

             LEUKOCYTE ESTERASE UA (BEAKER) Large        Negative     A         

   



             (test code = 466)                                        

 

             UROBILINOGEN UA (BEAKER) (test 0.2 mg/dL    0.2-1.0                

   



             code = 463)                                         

 

             RBC UA (BEAKER) (test code = 519) 0 /HPF                           

      

 

             WBC UA (BEAKER) (test code = 520) 267 /HPF                         

      

 

             BACTERIA (BEAKER) (test code = Moderate                            

   



             517)                                                

 

             SOURCE(BEAKER) (test code = 1753)                                  

      



CBC W/PLT COUNT &amp; AUTO [id number] 17:38:00





             Test Item    Value        Reference Range Interpretation Comments

 

             WHITE BLOOD CELL COUNT (BEAKER) 7.8 K/ L     3.5-10.5              

    



             (test code = 775)                                        

 

             RED BLOOD CELL COUNT (BEAKER) 5.12 M/ L    4.63-6.08               

  



             (test code = 761)                                        

 

             HEMOGLOBIN (BEAKER) (test code = 14.7 GM/DL   13.7-17.5            

     



             410)                                                

 

             HEMATOCRIT (BEAKER) (test code = 43.3 %       40.1-51.0            

     



             411)                                                

 

             MEAN CORPUSCULAR VOLUME (BEAKER) 84.6 fL      79.0-92.2            

     



             (test code = 753)                                        

 

             MEAN CORPUSCULAR HEMOGLOBIN 28.7 pg      25.7-32.2                 



             (BEAKER) (test code = 751)                                        

 

             MEAN CORPUSCULAR HEMOGLOBIN CONC 33.9 GM/DL   32.3-36.5            

     



             (BEAKER) (test code = 752)                                        

 

             RED CELL DISTRIBUTION WIDTH 15.3 %       11.6-14.4    H            



             (BEAKER) (test code = 412)                                        

 

             PLATELET COUNT (BEAKER) (test 344 K/CU MM  150-450                 

  



             code = 756)                                         

 

             MEAN PLATELET VOLUME (BEAKER) 11.0 fL      9.4-12.4                

  



             (test code = 754)                                        

 

             NUCLEATED RED BLOOD CELLS 0 /100 WBC   0-0                       



             (BEAKER) (test code = 413)                                        



(CELLAVISION MANUAL DIFF)2020 17:38:00





             Test Item    Value        Reference Range Interpretation Comments

 

             NEUTROPHILS - REL 63 %                                   



             (CELLAVISION)(BEAKER) (test code                                   

     



             = 2816)                                             

 

             LYMPHOCYTES - REL 23 %                                   



             (CELLAVISION)(BEAKER) (test code                                   

     



             = 2817)                                             

 

             MONOCYTES - REL 6 %                                    



             (CELLAVISION)(BEAKER) (test code                                   

     



             = 2818)                                             

 

             EOSINOPHILS - REL 5 %                                    



             (CELLAVISION)(BEAKER) (test code                                   

     



             = 2819)                                             

 

             BASOPHILS - REL 1 %                                    



             (CELLAVISION)(BEAKER) (test code                                   

     



             = 2820)                                             

 

             BANDS - REL (CELLAVISION)(BEAKER) 2 %          0-10                

      



             (test code = 2826)                                        

 

             NEUTROPHILS - ABS 4.91 K/ul    1.78-5.38                 



             (CELLAVISION)(BEAKER) (test code                                   

     



             = 2830)                                             

 

             LYMPHOCYTES - ABS 1.79 K/ul    1.32-3.57                 



             (CELLAVISION)(BEAKER) (test code                                   

     



             = 2831)                                             

 

             MONOCYTES - ABS 0.47 K/uL    0.30-0.82                 



             (CELLAVISION)(BEAKER) (test code                                   

     



             = 2832)                                             

 

             EOSINOPHILS - ABS 0.39 K/uL    0.04-0.54                 



             (CELLAVISION)(BEAKER) (test code                                   

     



             = 2834)                                             

 

             BASOPHILS - ABS 0.08 K/uL    0.01-0.08                 



             (CELLAVISION)(BEAKER) (test code                                   

     



             = 2835)                                             

 

             BANDS - ABS (CELLAVISION)(BEAKER) 0.16 K/uL    0.00-0.80           

      



             (test code = 2840)                                        

 

             TOTAL COUNTED (BEAKER) (test code 100                              

      



             = 1351)                                             

 

             SMUDGE CELLS (BEAKER) (test code Present                           

     



             = 1371)                                             

 

             GIANT PLATELETS (BEAKER) (test Present                             

   



             code = 313)                                         

 

             ANISOCYTOSIS (BEAKER) (test code 1+ few                            

     



             = 961)                                              

 

             POIKILOCYTES (BEAKER) (test code 2+ moderate                       

     



             = 966)                                              

 

             SPHEROCYTES (BEAKER) (test code = 1+ few                           

      



             768)                                                

 

             OVALOCYTES (BEAKER) (test code = 1+ few                            

     



             477)                                                

 

             TEAR DROP CELLS (BEAKER) (test 1+ few                              

   



             code = 481)                                         

 

             ARTIFACT (CELLAVISION)(BEAKER) Present                             

   



             (test code = 3432)                                        

 

             PLATELET CONCENTRATION Adequate                               



             (CELLAVISION)(BEAKER) (test code                                   

     



             = 3438)                                             



Received comment: User comments: Slide comments:POCT-GLUCOSE [id number] 
16:27:00





             Test Item    Value        Reference Range Interpretation Comments

 

             POC-GLUCOSE METER 424 mg/dL           HH           : Notified

 RN/MD:



             (BEAKER) (test code =                                        TESTED

 AT [facility] 9272 4737)                                               [facility],



                                                                 [id number]:



                                                                 /Techni

christina ID



                                                                 = [id number] for AK

INSONU,



                                                                 LONA



CT, BRAIN, WITHOUT [id number] 15:31:00FINAL REPORT PATIENT ID:   
[id number] CT, BRAIN, WITHOUT CONTRAST CLINICAL INDICATION:  Hydrocephalus C
OMPARISON: None TECHNIQUE:  Noncontrast axial CT imaging of the brain and skull.
 DOSE REDUCTION: Dose modulation, iterative reconstruction, and/or weight-based 
adjustment of the mA/kV was utilized to reduce the radiation dose to as low as 
reasonably achievable. FINDINGS:A total of two ventricular drainage catheters 
are present. A right transverse temporal catheter terminates across the midline 
just above the level of the foramen of Monro. A right parietal approach catheter
terminates in the pineal region. Supratentorial ventricular configuration is 
slitlike. There is no herniation. The basilar cisterns are preserved. There is 
no identifiable recent infarct. 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 Calvarial configuration escape of 
cephalic. Thereis no acute osseous abnormality.  IMPRESSION: Chronic, likely 
congenital parenchymal changes withoutrecent infarct or hemorrhage. A total of 
two ventricular drainage catheters are present. Supratentorial ventricular 
configuration is slitlike and may represent over shunting. Correlation 
recommended. Signed: JR Rae Robert MDReport Verified Date/Time:  
2020 15:31:08 Reading Location: [facility] C013V Neuro Reading Room    
Electronically signed by: ALONDRA RAE on 2020 03:31 PMRAD, 
SHUNT [id number] 15:13:00Reason for exam:-&gt;concern for VPS malfunction
FINAL REPORT PATIENT ID:   [id number] RAD, SHUNT SERIES INDICATION: concern for 
VPS malfunction COMPARISON: None TECHNIQUE: AP and lateral radiographs of the 
skull, abdomen and chest were acquired to evaluate shunt catheter FINDINGS:An 
abandoned shunt catheter is present within the right neck. Medial tothis a 
second shunt catheter is present which descends along the left chest wall, 
terminating withinthe mid pelvis. Radiopaque portions of the catheter appear 
contiguous. Signed: Lisa Reyes MDReport Verified Date/Time:  2020 
15:13:54 Reading Location: [facility] Radiology Reading Room  Electronically 
signed by: LISA REYES MD on 2020 03:13 PMPOCT-GLUCOSE METER
2020 11:38:00





             Test Item    Value        Reference Range Interpretation Comments

 

             POC-GLUCOSE METER 394 mg/dL           H            : TESTED A

T [facility] [address]



             [facility] (test code =                                        MILY NEL[facility],



             1538)                                               [id number]:



                                                                 /Techni

christina ID



                                                                 = [id number] for ERMELINDA DUBOSEONLONA BARBOSA



HEMOGLOBIN [id number] 09:15:00





             Test Item    Value        Reference Range Interpretation Comments

 

             HEMOGLOBIN A1C (BEAKER) (test code = 10.4 %       4.3-6.1      H   

         



             368)                                                



TSH/FREE T4 IF [id number] 07:54:00





             Test Item    Value        Reference Range Interpretation Comments

 

             THYROID STIMULATING HORMONE 1.40 uIU/mL  0.35-4.94                 



             (BEAKER) (test code = 772)                                        



POCT-GLUCOSE [id number] 07:48:00





             Test Item    Value        Reference Range Interpretation Comments

 

             POC-GLUCOSE METER > mg/dL             HH           : Notified

 RN/MD: TESTED



             (BEAKER) (test code =                                        AT [facility] [address] [facility]



             1538)                                               [facility], [address]

[address]:



                                                                 /Techni

christina ID =



                                                                 [id number] for LONA GOLDSMITH



BASIC METABOLIC [id number] 07:44:00





             Test Item    Value        Reference Range Interpretation Comments

 

             SODIUM (BEAKER) 134 meq/L    136-145      L            



             (test code = 381)                                        

 

             POTASSIUM (BEAKER) 4.4 meq/L    3.5-5.1                   



             (test code = 379)                                        

 

             CHLORIDE (BEAKER) 103 meq/L                        



             (test code = 382)                                        

 

             CO2 (BEAKER) (test 20 meq/L     22-29        L            



             code = 355)                                         

 

             BLOOD UREA NITROGEN 17 mg/dL     7-21                      



             (BEAKER) (test code                                        



             = 354)                                              

 

             CREATININE (BEAKER) 1.09 mg/dL   0.57-1.25                 



             (test code = 358)                                        

 

             GLUCOSE RANDOM 464 mg/dL           HH           



             (BEAKER) (test code                                        



             = 652)                                              

 

             CALCIUM (BEAKER) 8.6 mg/dL    8.4-10.2                  



             (test code = 697)                                        

 

             EGFR (BEAKER) (test 94 mL/min/1.73                           ESTIMA

HUMA GFR IS



             code = 1092) sq m                                   NOT AS ACCURATE

 AS



                                                                 CREATININE



                                                                 CLEARANCE IN



                                                                 PREDICTING



                                                                 GLOMERULAR



                                                                 FILTRATION RATE

.



                                                                 ESTIMATED GFR I

S



                                                                 NOT APPLICABLE 

FOR



                                                                 DIALYSIS PATIEN

TS.



LIPID [id number] 07:34:00





             Test Item    Value        Reference Range Interpretation Comments

 

             TRIGLYCERIDES (BEAKER) (test code = 750 mg/dL                      

        



             540)                                                

 

             CHOLESTEROL (BEAKER) (test code = 196 mg/dL                        

      



             631)                                                

 

             HDL CHOLESTEROL (BEAKER) (test code 22 mg/dL                       

        



             = 976)                                              



Calculated LDL not valid if triglyceride &gt;400 mg/dLTriglyceride Reference 
Range:   Low Risk  &lt;150   Borderline    150-199   High Risk     200-499   
Very High Risk  &gt;=500Cholesterol Reference Range:   Low Risk         &lt;200 
 Borderline    200-239    High Risk        &gt;240HDL Cholesterol Reference 
Range:   Low Risk         &gt;=60   High Risk         &lt;40LDL Cholesterol 
ReferenceRange:   Optimal          &lt;100   Near Optimal  100-129   Borderline 
  130-159   High          160-189   Very High       &gt;=190POCT-GLUCOSE METER
2020 00:49:00





             Test Item    Value        Reference Range Interpretation Comments

 

             POC-GLUCOSE METER 399 mg/dL           H            : TESTED A

T [facility] [address]



             [facility] (test code =                                        DELMYKAR NELSON [facility],



             1538)                                               [id number]:



                                                                 /Techni

christina ID



                                                                 = [id number] for CLAY BATRES



RAD, FOOT, 2 VIEWS, [id number] 22:28:00Reason for exam:-&gt;osteomyletitis
FINAL REPORT PATIENT ID:   [id number] TECHNIQUE: Two views each of the bilateral 
feet HISTORY: osteomyletitis. COMPARISON: None. IMPRESSION:No acute displaced 
fracture or dislocation. Joint spaces are within normal limits.Severe soft 
tissue swelling.On the right subcentimeter nonspecific calcifications adjacent 
to the heel plantar aspect. Correlate with physical exam. Severely limited exam 
due to patient body habitus. No definite cortical irregularity.There is clinical
concern for osteomyelitis, recommend MRI with contrast. Signed: Sriram Valera Verified Date/Time:  2020 22:28:25 Reading Location: [facility] C0[facility]
Consult Reading Room  Electronically signed by: SRIRAM VALERA DO on 
2020 10:28 PMRAD, FOOT, 2 VIEWS, [id number] 22:28:00Reason for 
exam:-&gt;osteomyletitsFINAL REPORT PATIENT ID:   [id number] TECHNIQUE: Two views 
each of the bilateral feet HISTORY: osteomyletitis. COMPARISON: None. 
IMPRESSION:No acute displaced fracture or dislocation. Joint spaces are within 
normal limits.Severe soft tissue swelling.On the right subcentimeter nonspecific
calcifications adjacent to the heel plantar aspect. Correlate with physical 
exam. Severely limited exam due to patient body habitus. No definite cortical 
irregularity.There is clinical concern for osteomyelitis, recommend MRI with 
contrast. Signed: Sriram Valera Verified Date/Time:  2020 
22:28:25 Reading Location: [address] Consult Reading Room  Electronically 
signed by: SRIRAM VALERA DO on 2020 10:28 PMPOCT-GLUCOSE METER
2020 21:04:00





             Test Item    Value        Reference Range Interpretation Comments

 

             POC-GLUCOSE METER 430 mg/dL           HH           : Notified

 RN/MD:



             (KUN) (test code =                                        TESTED

 AT [facility] [address]



             1538)                                               [facility],



                                                                 [id number]:



                                                                 /Techni

christina ID



                                                                 = [id number] for SO

DIPCASTRO



                                                                 DEYSI



ERTAPENEM:SUSC:PT:ISOLATE:ORDQN:[id number] 16:48:00





             Test Item    Value        Reference Range Interpretation Comments

 

             Culture: Urine (test >100,000 CFU/mL Proteus                       

    



             code = Culture: mirabilis 10,000 -                           



             Urine)       50,000 CFU/mL Skin Jaime                           



[facility]OLATE:ORDQN:[id number] 16:48:00





             Test Item    Value        Reference Range Interpretation Comments

 

             Proteus mirabilis (test Proteus mirabilis                          

 



             code = Proteus mirabilis)                                        



[facility] AND [id number] 16:48:00





             Test Item    Value        Reference Range Interpretation Comments

 

             UA Nitrite (test code Negative (18 10:48                      

     



             = UA Nitrite) AM)                                    



[facility] AND [id number] 16:48:00





             Test Item    Value        Reference Range Interpretation Comments

 

             UA Bili (test code = Negative *NA*(18                         

  



             UA Bili)     10:48 AM)                              



[facility] AND [id number] 16:48:00





             Test Item    Value        Reference Range Interpretation Comments

 

             UA Ketones (test code Negative *NA*(18                        

   



             = UA Ketones) 10:48 AM)                              



[facility] AND [id number] 16:48:00





             Test Item    Value        Reference Range Interpretation Comments

 

             UA Blood (test code = Trace *ABN*(18                          

 



             UA Blood)    10:48 AM)                              



[facility] AND [id number] 16:48:00





             Test Item    Value        Reference Range Interpretation Comments

 

             UA Urobilinogen (test code = UA 0.2          0.1-1.0               

    



             Urobilinogen)                                        



[facility] AND [id number] 16:48:00





             Test Item    Value        Reference Range Interpretation Comments

 

             UA Leuk Est (test code Large *ABN*(18                         

  



             = UA Leuk Est) 10:48 AM)                              



[facility] AND [id number] 16:48:00





             Test Item    Value        Reference Range Interpretation Comments

 

             UA Protein (test code Negative (18 10:48                      

     



             = UA Protein) AM)                                    



[facility] AND [id number] 16:48:00





             Test Item    Value        Reference Range Interpretation Comments

 

             UA Glucose (test code Negative (18 10:48                      

     



             = UA Glucose) AM)                                    



[facility] AND [id number] 16:48:00





             Test Item    Value        Reference Range Interpretation Comments

 

             UA pH (test code = UA pH) 7.0 1        5.0-8.0                   



[facility] AND [id number] 16:48:00





             Test Item    Value        Reference Range Interpretation Comments

 

             UA Spec Grav (test code = UA Spec 1.015 1                          

      



             Grav)                                               



[facility] AND [id number] 16:48:00





             Test Item    Value        Reference Range Interpretation Comments

 

             UA Color (test code = Yellow *NA*(18                          

 



             UA Color)    10:48 AM)                              



[facility] AND [id number] 16:48:00





             Test Item    Value        Reference Range Interpretation Comments

 

             UA Turbidity (test code = Clear (18 10:48                     

      



             UA Turbidity) AM)                                    



[facility] AND [id number] 16:48:00





             Test Item    Value        Reference Range Interpretation Comments

 

             UA Mucus (test code = UA Mucus) Few /LPF                           

    



[facility] AND [id number] 16:48:00





             Test Item    Value        Reference Range Interpretation Comments

 

             UA Bacteria (test code = UA Few /HPF                               



             Bacteria)                                           



[facility] AND [id number] 16:48:00





             Test Item    Value        Reference Range Interpretation Comments

 

             UA RBC (test code = 0-2 /HPF     See_Comment                [Automa

huma message] The



             UA RBC)                                             system which ge

nerated



                                                                 this result tra

nsmitted



                                                                 reference range

: <=2. The



                                                                 reference range

 was not



                                                                 used to interpr

et this



                                                                 result as zayda

l/abnormal.



[facility] AND [id number] 16:48:00





             Test Item    Value        Reference Range Interpretation Comments

 

             UA Sq Epi (test code = None Seen (18                          

 



             UA Sq Epi)   10:48 AM)                              



[facility] AND [id number] 16:48:00





             Test Item    Value        Reference Range Interpretation Comments

 

             UA WBC (test code = UA WBC)  /HPF                            



[facility] [id number] 11:57:00





             Test Item    Value        Reference Range Interpretation Comments

 

             Antibody Scrn (test Negative (18 5:57                         

  



             code = Antibody Scrn) AM)                                    



[facility] [id number] 11:57:00





             Test Item    Value        Reference Range Interpretation Comments

 

             ABO/Rh (test code = ABO/Rh) AB POS                                 



[facility] [id number] 11:57:00





             Test Item    Value        Reference Range Interpretation Comments

 

             PTT (test code = PTT) 33.4 s       22.9-35.8                 



[facility] [id number] 11:57:00





             Test Item    Value        Reference Range Interpretation Comments

 

             PT (test code = PT) 13.7 s       12.0-14.7                 



[facility] A[id number] 11:57:00





             Test Item    Value        Reference Range Interpretation Comments

 

             INR (test code = INR) 1.05 1       0.85-1.17                 



[facility]2018-11-08 10:50:01





             Test Item    Value        Reference Range Interpretation Comments

 

             Potassium Lvl (test code = Potassium 4.2          3.5-5.1          

         



             Lvl)                                                



[facility]2018-11-08 10:50:01





             Test Item    Value        Reference Range Interpretation Comments

 

             Sodium Lvl (test code = Sodium Lvl) 137          135-145           

        



[facility]2018-11-08 10:50:01





             Test Item    Value        Reference Range Interpretation Comments

 

             Creatinine Lvl (test code = Creatinine 0.85         0.50-1.40      

           



             Lvl)                                                



[facility]2018-11-08 10:50:01





             Test Item    Value        Reference Range Interpretation Comments

 

             AGAP (test code = AGAP) 9.2          10.0-20.0                 



[facility] [id number] 10:50:01





             Test Item    Value        Reference Range Interpretation Comments

 

             ACT (TEG) Rapid (test code = ACT (TEG) 136 s                 

           



             Rapid)                                              



[facility] [id number] 10:50:01





             Test Item    Value        Reference Range Interpretation Comments

 

             Split Point Rapid (test code = Split 0.6 min                       

         



             Point Rapid)                                        



[facility] [id number] 10:50:01





             Test Item    Value        Reference Range Interpretation Comments

 

             R-time Rapid (test code = R-time 0.9 min      0.4-0.7              

     



             Rapid)                                              



[facility] A[id number] 10:50:01





             Test Item    Value        Reference Range Interpretation Comments

 

             K-time Rapid (test code = K-time 1.4 min      0.6-2.3              

     



             Rapid)                                              



[facility] A[id number] 10:50:01





             Test Item    Value        Reference Range Interpretation Comments

 

             Angle Rapid (test code = Angle 71 degrees   64-80                  

   



             Rapid)                                              



[facility] A[id number] 10:50:01





             Test Item    Value        Reference Range Interpretation Comments

 

             G-value Rapid (test code = G-value 12.7         5.0-11.6           

       



             Rapid)                                              



[facility] [id number] 10:50:01





             Test Item    Value        Reference Range Interpretation Comments

 

             Max Amplitude Rapid (test code = Max 72 mm        52-71            

         



             Amplitude Rapid)                                        



[facility] AllianceXkayvpmDUVWIJJIDG3471-66-73 10:50:01





             Test Item    Value        Reference Range Interpretation Comments

 

             Estimated % Lysis Rapid 0.1          See_Comment                [Au

tomated message] The



             (test code = Estimated                                        syste

m which generated



             % Lysis Rapid)                                        this result t

ransmitted



                                                                 reference range

: <=7.5.



                                                                 The reference r

zhen was



                                                                 not used to int

erpret



                                                                 this result as



                                                                 normal/abnormal

.



[facility] AllianceWfnbehzIUNCSWLTHH4358-94-38 10:50:01





             Test Item    Value        Reference Range Interpretation Comments

 

             Platelet (test code = Platelet) 367          133-450               

    



[facility] AllianceIjparqsGODRBTNTXI6546-15-75 10:50:01





             Test Item    Value        Reference Range Interpretation Comments

 

             MPV (test code = MPV) 7.8          7.4-10.4                  



[facility] AllianceMocteeaFQVDXAPIKH7955-06-49 10:50:01





             Test Item    Value        Reference Range Interpretation Comments

 

             MCH (test code = MCH) 27.4 pg      27.0-31.0                 



[facility] AllianceFozifeqQXOUTTJTIE3050-50-73 10:50:01





             Test Item    Value        Reference Range Interpretation Comments

 

             MCV (test code = MCV) 80.7         80.0-94.0                 



[facility] AllianceCynagolWATQFIJHFK6965-75-23 10:50:01





             Test Item    Value        Reference Range Interpretation Comments

 

             MCHC (test code = MCHC) 34.0         32.0-36.0                 



[facility] AllianceIcpuregXHXFFFARBE6041-36-46 10:50:01





             Test Item    Value        Reference Range Interpretation Comments

 

             RDW (test code = RDW) 18.9         11.5-14.5                 



[facility] AllianceVevazwtQTYGYKHINC1629-66-82 10:50:01





             Test Item    Value        Reference Range Interpretation Comments

 

             Hct (test code = Hct) 43.3         42.0-54.0                 



[facility] AllianceZxfuestAWAUKKQGMM3805-72-90 10:50:01





             Test Item    Value        Reference Range Interpretation Comments

 

             WBC (test code = WBC) 9.3          3.7-10.4                  



[facility] AllianceCsxgezgDVKTWQLHWU1001-69-55 10:50:01





             Test Item    Value        Reference Range Interpretation Comments

 

             Hgb (test code = Hgb) 14.7         14.0-18.0                 



[facility] AllianceKtwznykNYWGIEHHPO9183-72-00 10:50:01





             Test Item    Value        Reference Range Interpretation Comments

 

             RBC (test code = RBC) 5.36         4.70-6.10                 



[facility] AllianceVbzejqeTBPFFKVYTU0967-31-49 10:50:01





             Test Item    Value        Reference Range Interpretation Comments

 

             Eosinophils # (test code 0.2          See_Comment                [A

utomated message] The



             = Eosinophils #)                                        system whic

h generated



                                                                 this result tra

nsmitted



                                                                 reference range

: <=0.5.



                                                                 The reference r

zhen was



                                                                 not used to int

erpret



                                                                 this result as



                                                                 normal/abnormal

.



[facility] AllianceJnqtjwrQKAJRQKHIG9046-00-15 10:50:01





             Test Item    Value        Reference Range Interpretation Comments

 

             Basophils # (test code 0.1          See_Comment                [Aut

omated message] The



             = Basophils #)                                        system which 

generated



                                                                 this result tra

nsmitted



                                                                 reference range

: <=0.2.



                                                                 The reference r

zhen was



                                                                 not used to int

erpret



                                                                 this result as



                                                                 normal/abnormal

.



[facility] AllianceNoqdjegNVOTGWEIIZ0695-64-66 10:50:01





             Test Item    Value        Reference Range Interpretation Comments

 

             Lymphocytes # (test code = Lymphocytes 1.8          1.0-5.5        

           



             #)                                                  



[facility] AllianceAibrwejWHQSZKDAIG4755-00-25 10:50:01





             Test Item    Value        Reference Range Interpretation Comments

 

             Monocytes # (test code 0.9          See_Comment                [Aut

omated message] The



             = Monocytes #)                                        system which 

generated



                                                                 this result tra

nsmitted



                                                                 reference range

: <=0.8.



                                                                 The reference r

zhen was



                                                                 not used to int

erpret



                                                                 this result as



                                                                 normal/abnormal

.



[facility] AllianceLtngednIEYIUGPMKG8371-59-54 10:50:01





             Test Item    Value        Reference Range Interpretation Comments

 

             Neutrophils # (test code = Neutrophils 6.3          1.5-8.1        

           



             #)                                                  



[facility] AlliancePjryrdqMNKCWGZBJA8012-09-85 10:50:01





             Test Item    Value        Reference Range Interpretation Comments

 

             Eosinophils (test code = 2.0          See_Comment                [A

utomated message] The



             Eosinophils)                                        system which ge

nerated



                                                                 this result tra

nsmitted



                                                                 reference range

: <=4.0.



                                                                 The reference r

zhen was



                                                                 not used to int

erpret



                                                                 this result as



                                                                 normal/abnormal

.



[facility] AllianceSbfbngtCGHVBKTOCH0702-02-31 10:50:01





             Test Item    Value        Reference Range Interpretation Comments

 

             Segs (test code = Segs) 67.4         45.0-75.0                 



[facility] AllianceYpzabigGVIRRKBPAC3406-08-44 10:50:01





             Test Item    Value        Reference Range Interpretation Comments

 

             Lymphocytes (test code = Lymphocytes) 19.9         20.0-40.0       

          



[facility] AllianceJfxtqnfDTTAHZJQBN3259-51-24 10:50:01





             Test Item    Value        Reference Range Interpretation Comments

 

             Basophils (test code = 1.0          See_Comment                [Aut

omated message] The



             Basophils)                                          system which ge

nerated



                                                                 this result tra

nsmitted



                                                                 reference range

: <=1.0.



                                                                 The reference r

zhen was



                                                                 not used to int

erpret



                                                                 this result as



                                                                 normal/abnormal

.



[facility] AllianceCrmsgyhYHCEWNKACD2129-79-16 10:50:01





             Test Item    Value        Reference Range Interpretation Comments

 

             Monocytes (test code = Monocytes) 9.7          2.0-12.0            

      



[facility]2018-11-08 10:50:01





             Test Item    Value        Reference Range Interpretation Comments

 

             eGFR (test code = eGFR) 133                                    



[facility]2018-11-08 10:50:01





             Test Item    Value        Reference Range Interpretation Comments

 

             Calcium Lvl (test code = Calcium Lvl) 9.4          8.5-10.5        

          



[facility]2018-11-08 10:50:01





             Test Item    Value        Reference Range Interpretation Comments

 

             CO2 (test code = CO2) 28           24-32                     



[facility]2018-11-08 10:50:01





             Test Item    Value        Reference Range Interpretation Comments

 

             BUN (test code = BUN) 14           7-22                      



[facility]2018-11-08 10:50:01





             Test Item    Value        Reference Range Interpretation Comments

 

             Glucose Lvl (test code = Glucose Lvl) 89           70-99           

          



[facility]2018-11-08 10:50:01





             Test Item    Value        Reference Range Interpretation Comments

 

             Chloride Lvl (test code = Chloride Lvl) 104                  

            



[facility]2018-05-08 05:42:00





             Test Item    Value        Reference Range Interpretation Comments

 

             B/C Ratio (test code = B/C Ratio) 17 1         6-25                

      



[facility]2018-05-08 05:42:00





             Test Item    Value        Reference Range Interpretation Comments

 

             Globulin (test code = Globulin) 4.3          2.7-4.2               

    



[facility]2018-05-08 05:42:00





             Test Item    Value        Reference Range Interpretation Comments

 

             A/G Ratio (test code = A/G Ratio) 0.7 1        0.7-1.6             

      



[facility]2018-05-08 05:42:00





             Test Item    Value        Reference Range Interpretation Comments

 

             AGAP (test code = AGAP) 14.4         10.0-20.0                 



[facility]2018-05-08 05:42:00





             Test Item    Value        Reference Range Interpretation Comments

 

             eGFR (test code = eGFR) 113                                    



[place]6038-05-08 05:42:00





             Test Item    Value        Reference Range Interpretation Comments

 

             Alk Phos (test code = Alk Phos) 76                           

    



[facility]2018-05-08 05:42:00





             Test Item    Value        Reference Range Interpretation Comments

 

             ALT (test code = ALT) 35           See_Comment                [Auto

mated message] The



                                                                 system which ge

nerated this



                                                                 result transmit

huma



                                                                 reference range

: <=65. The



                                                                 reference range

 was not



                                                                 used to interpr

et this



                                                                 result as zayda

l/abnormal.



[facility]2018-05-08 05:42:00





             Test Item    Value        Reference Range Interpretation Comments

 

             Albumin Lvl (test code = Albumin Lvl) 2.8          3.5-5.0         

          



[facility]2018-05-08 05:42:00





             Test Item    Value        Reference Range Interpretation Comments

 

             Total Protein (test code = Total 7.1          6.4-8.4              

     



             Protein)                                            



[facility]2018-05-08 05:42:00





             Test Item    Value        Reference Range Interpretation Comments

 

             Calcium Lvl (test code = Calcium Lvl) 8.7          8.5-10.5        

          



[facility]2018-05-08 05:42:00





             Test Item    Value        Reference Range Interpretation Comments

 

             AST (test code = AST) 18           See_Comment                [Auto

mated message] The



                                                                 system which ge

nerated this



                                                                 result transmit

huma



                                                                 reference range

: <=37. The



                                                                 reference range

 was not



                                                                 used to interpr

et this



                                                                 result as zayda

l/abnormal.



[facility]2018-05-08 05:42:00





             Test Item    Value        Reference Range Interpretation Comments

 

             Bili Total (test code = Bili Total) 0.3          0.2-1.3           

        



[facility]2018-05-08 05:42:00





             Test Item    Value        Reference Range Interpretation Comments

 

             Potassium Lvl (test code = Potassium 4.4          3.5-5.1          

         



             Lvl)                                                



[facility]2018-05-08 05:42:00





             Test Item    Value        Reference Range Interpretation Comments

 

             Chloride Lvl (test code = Chloride Lvl) 109                  

            



[facility]2018-05-08 05:42:00





             Test Item    Value        Reference Range Interpretation Comments

 

             CO2 (test code = CO2) 23           24-32                     



[facility]2018-05-08 05:42:00





             Test Item    Value        Reference Range Interpretation Comments

 

             Glucose Lvl (test code = Glucose Lvl) 114          70-99           

          



[facility]2018-05-08 05:42:00





             Test Item    Value        Reference Range Interpretation Comments

 

             Creatinine Lvl (test code = Creatinine 1.01         0.50-1.40      

           



             Lvl)                                                



[facility]2018-05-08 05:42:00





             Test Item    Value        Reference Range Interpretation Comments

 

             BUN (test code = BUN) 17           7-22                      



[facility]2018-05-08 05:42:00





             Test Item    Value        Reference Range Interpretation Comments

 

             Sodium Lvl (test code = Sodium Lvl) 142          135-145           

        



[facility] AllianceOigdtzbWLDLJHAPMI8195-94-15 05:42:00





             Test Item    Value        Reference Range Interpretation Comments

 

             Basophils (test code = 0.6          See_Comment                [Aut

omated message] The



             Basophils)                                          system which ge

nerated



                                                                 this result tra

nsmitted



                                                                 reference range

: <=1.0.



                                                                 The reference r

zhen was



                                                                 not used to int

erpret



                                                                 this result as



                                                                 normal/abnormal

.



[facility] AllianceDfqlmifLQKXFOXABB3905-38-74 05:42:00





             Test Item    Value        Reference Range Interpretation Comments

 

             Segs-Bands # (test code = Segs-Bands #) 4.8          1.5-8.1       

            



[facility] AllianceYyawokeUDRVVCAJSL2535-10-71 05:42:00





             Test Item    Value        Reference Range Interpretation Comments

 

             Monocytes # (test code 0.7          See_Comment                [Aut

omated message] The



             = Monocytes #)                                        system which 

generated



                                                                 this result tra

nsmitted



                                                                 reference range

: <=0.8.



                                                                 The reference r

zhen was



                                                                 not used to int

erpret



                                                                 this result as



                                                                 normal/abnormal

.



[facility] AllianceNwzuqqePSGZYTPIXP5678-65-35 05:42:00





             Test Item    Value        Reference Range Interpretation Comments

 

             Lymphocytes # (test code = Lymphocytes 1.9          1.0-5.5        

           



             #)                                                  



[facility] AllianceJhrxpvjBWNNPPTDNF4238-90-62 05:42:00





             Test Item    Value        Reference Range Interpretation Comments

 

             Monocytes (test code = Monocytes) 9.4          2.0-12.0            

      



[facility] AllianceLqrfmaeTFRXSXYQKX3072-78-99 05:42:00





             Test Item    Value        Reference Range Interpretation Comments

 

             Eosinophils # (test code 0.2          See_Comment                [A

utomated message] The



             = Eosinophils #)                                        system whic

h generated



                                                                 this result tra

nsmitted



                                                                 reference range

: <=0.5.



                                                                 The reference r

zhen was



                                                                 not used to int

erpret



                                                                 this result as



                                                                 normal/abnormal

.



[facility] AllianceUypzfqjSZMWULXKCV6757-76-76 05:42:00





             Test Item    Value        Reference Range Interpretation Comments

 

             Eosinophils (test code = 2.9          See_Comment                [A

utomated message] The



             Eosinophils)                                        system which ge

nerated



                                                                 this result tra

nsmitted



                                                                 reference range

: <=4.0.



                                                                 The reference r

zhen was



                                                                 not used to int

erpret



                                                                 this result as



                                                                 normal/abnormal

.



[facility] AllianceQjzjmksUAYSQJBEPS3172-55-30 05:42:00





             Test Item    Value        Reference Range Interpretation Comments

 

             Segs (test code = Segs) 62.2         45.0-75.0                 



[facility] AllianceVkcgzuoTHQTLFUTFN0726-82-97 05:42:00





             Test Item    Value        Reference Range Interpretation Comments

 

             Lymphocytes (test code = Lymphocytes) 24.9         20.0-40.0       

          



[facility] AllianceEjoxipkURYJUCIHPH5859-84-10 05:42:00





             Test Item    Value        Reference Range Interpretation Comments

 

             MCH (test code = MCH) 27.5 pg      27.0-31.0                 



[facility] AllianceVhadbeuABJSJRFKPS5396-11-35 05:42:00





             Test Item    Value        Reference Range Interpretation Comments

 

             MCV (test code = MCV) 85.3         80.0-94.0                 



[facility] AllianceNbkszohZUJDJNPHUA3397-36-62 05:42:00





             Test Item    Value        Reference Range Interpretation Comments

 

             Hct (test code = Hct) 43.9         42.0-54.0                 



[facility] AllianceZzqscsaFVUJOMBBJA6504-02-28 05:42:00





             Test Item    Value        Reference Range Interpretation Comments

 

             Hgb (test code = Hgb) 14.2         14.0-18.0                 



[facility] AllianceMepouduVVMIHHRBNY9773-93-30 05:42:00





             Test Item    Value        Reference Range Interpretation Comments

 

             WBC (test code = WBC) 7.7          3.7-10.4                  



[facility] AllianceLwzdzqyJWWWMYDFQL0864-19-83 05:42:00





             Test Item    Value        Reference Range Interpretation Comments

 

             RBC (test code = RBC) 5.15         4.70-6.10                 



[facility] AllianceJjsnkxfRAREVFXNLZ1002-46-44 05:42:00





             Test Item    Value        Reference Range Interpretation Comments

 

             MPV (test code = MPV) 8.4          7.4-10.4                  



[facility] AllianceNwlgazcULSUNUKIYA9388-27-89 05:42:00





             Test Item    Value        Reference Range Interpretation Comments

 

             MCHC (test code = MCHC) 32.3         32.0-36.0                 



[facility] AllianceZegulclMKWQQVBQOY6416-74-51 05:42:00





             Test Item    Value        Reference Range Interpretation Comments

 

             RDW (test code = RDW) 17.3         11.5-14.5                 



[facility] AllianceFdejfdeWCGNYIKEGY4065-64-09 05:42:00





             Test Item    Value        Reference Range Interpretation Comments

 

             Platelet (test code = Platelet) 317          133-450               

    



[facility]2018-05-07 09:36:00





             Test Item    Value        Reference Range Interpretation Comments

 

             Globulin (test code = Globulin) 4.4          2.7-4.2               

    



[facility]2018-05-07 09:36:00





             Test Item    Value        Reference Range Interpretation Comments

 

             A/G Ratio (test code = A/G Ratio) 0.6 1        0.7-1.6             

      



[facility]2018-05-07 09:36:00





             Test Item    Value        Reference Range Interpretation Comments

 

             B/C Ratio (test code = B/C Ratio) 17 1         6-25                

      



[facility]2018-05-07 09:36:00





             Test Item    Value        Reference Range Interpretation Comments

 

             AGAP (test code = AGAP) 11.3         10.0-20.0                 



[facility]2018-05-07 09:36:00





             Test Item    Value        Reference Range Interpretation Comments

 

             eGFR (test code = eGFR) 134                                    



[facility]2018-05-07 09:36:00





             Test Item    Value        Reference Range Interpretation Comments

 

             Creatinine Lvl (test code = Creatinine 0.84         0.50-1.40      

           



             Lvl)                                                



[facility]2018-05-07 09:36:00





             Test Item    Value        Reference Range Interpretation Comments

 

             Sodium Lvl (test code = Sodium Lvl) 142          135-145           

        



[facility]2018-05-07 09:36:00





             Test Item    Value        Reference Range Interpretation Comments

 

             Glucose Lvl (test code = Glucose Lvl) 99           70-99           

          



[facility]2018-05-07 09:36:00





             Test Item    Value        Reference Range Interpretation Comments

 

             BUN (test code = BUN) 14           7-22                      



[facility]2018-05-07 09:36:00





             Test Item    Value        Reference Range Interpretation Comments

 

             Alk Phos (test code = Alk Phos) 79                           

    



[facility]2018-05-07 09:36:00





             Test Item    Value        Reference Range Interpretation Comments

 

             Bili Total (test code = Bili Total) 0.3          0.2-1.3           

        



[facility]2018-05-07 09:36:00





             Test Item    Value        Reference Range Interpretation Comments

 

             AST (test code = AST) 14           See_Comment                [Auto

mated message] The



                                                                 system which ge

nerated this



                                                                 result transmit

huma



                                                                 reference range

: <=37. The



                                                                 reference range

 was not



                                                                 used to interpr

et this



                                                                 result as zayda

l/abnormal.



[place]6038-05-07 09:36:00





             Test Item    Value        Reference Range Interpretation Comments

 

             ALT (test code = ALT) 43           See_Comment                [Auto

mated message] The



                                                                 system which ge

nerated this



                                                                 result transmit

huma



                                                                 reference range

: <=65. The



                                                                 reference range

 was not



                                                                 used to interpr

et this



                                                                 result as zayda

l/abnormal.



[place] 976038-05-07 09:36:00





             Test Item    Value        Reference Range Interpretation Comments

 

             Total Protein (test code = Total 7.1          6.4-8.4              

     



             Protein)                                            



[place] 976038-05-07 09:36:00





             Test Item    Value        Reference Range Interpretation Comments

 

             Albumin Lvl (test code = Albumin Lvl) 2.7          3.5-5.0         

          



[place] 976038-05-07 09:36:00





             Test Item    Value        Reference Range Interpretation Comments

 

             Calcium Lvl (test code = Calcium Lvl) 9.2          8.5-10.5        

          



[place] 976038-05-07 09:36:00





             Test Item    Value        Reference Range Interpretation Comments

 

             CO2 (test code = CO2) 21           24-32                     



[place] 976038-05-07 09:36:00





             Test Item    Value        Reference Range Interpretation Comments

 

             Potassium Lvl (test code = Potassium 4.3          3.5-5.1          

         



             Lvl)                                                



[place] 976038-05-07 09:36:00





             Test Item    Value        Reference Range Interpretation Comments

 

             Chloride Lvl (test code = Chloride Lvl) 114                  

            



[facility]FM[facility]10[facility]





             Test Item    Value        Reference Range Interpretation Comments

 

             MCHC (test code = MCHC) 32.5         32.0-36.0                 



[place] 196448-05-07 09:36:00





             Test Item    Value        Reference Range Interpretation Comments

 

             RDW (test code = RDW) 17.5         11.5-14.5                 



[place] 196448-05-07 09:36:00





             Test Item    Value        Reference Range Interpretation Comments

 

             Platelet (test code = Platelet) 400          133-450               

    



[facility] AllianceXyzpwpzBGYRJFRECM8786-85-15 09:36:00





             Test Item    Value        Reference Range Interpretation Comments

 

             MPV (test code = MPV) 8.5          7.4-10.4                  



[facility] AllianceSfplcfzMGINZWWVWX8157-84-32 09:36:00





             Test Item    Value        Reference Range Interpretation Comments

 

             WBC (test code = WBC) 6.6          3.7-10.4                  



[facility] AllianceHtbxsnpYPXYNGSEFV2313-18-02 09:36:00





             Test Item    Value        Reference Range Interpretation Comments

 

             RBC (test code = RBC) 5.17         4.70-6.10                 



[facility] AlliancePvmvzdfOEVCGXQALY4084-69-35 09:36:00





             Test Item    Value        Reference Range Interpretation Comments

 

             MCV (test code = MCV) 86.1         80.0-94.0                 



[facility] AllianceMdrhtmaIPTIYADJLG3220-83-78 09:36:00





             Test Item    Value        Reference Range Interpretation Comments

 

             Hct (test code = Hct) 44.5         42.0-54.0                 



[facility] AllianceUzmqhorTZQWVFUMMB4745-93-52 09:36:00





             Test Item    Value        Reference Range Interpretation Comments

 

             MCH (test code = MCH) 28.0 pg      27.0-31.0                 



[facility] AllianceFvdvnxkNVITFLAWRJ3707-51-79 09:36:00





             Test Item    Value        Reference Range Interpretation Comments

 

             Hgb (test code = Hgb) 14.5         14.0-18.0                 



[facility] AllianceTfpxnumJXRJMNZNUS5829-31-73 09:36:00





             Test Item    Value        Reference Range Interpretation Comments

 

             Lymphocytes # (test code = Lymphocytes 1.7          1.0-5.5        

           



             #)                                                  



[facility] AllianceCnbedjuIVKMYUTFFL4757-06-52 09:36:00





             Test Item    Value        Reference Range Interpretation Comments

 

             Monocytes # (test code 0.7          See_Comment                [Aut

omated message] The



             = Monocytes #)                                        system which 

generated



                                                                 this result tra

nsmitted



                                                                 reference range

: <=0.8.



                                                                 The reference r

zhen was



                                                                 not used to int

erpret



                                                                 this result as



                                                                 normal/abnormal

.



[facility] AllianceIocntpfCVNGJMJEQI4383-69-23 09:36:00





             Test Item    Value        Reference Range Interpretation Comments

 

             Eosinophils # (test code 0.2          See_Comment                [A

utomated message] The



             = Eosinophils #)                                        system whic

h generated



                                                                 this result tra

nsmitted



                                                                 reference range

: <=0.5.



                                                                 The reference r

zhen was



                                                                 not used to int

erpret



                                                                 this result as



                                                                 normal/abnormal

.



[facility] AllianceViqtamaYCEWKIUCKV8666-66-11 09:36:00





             Test Item    Value        Reference Range Interpretation Comments

 

             Lymphocytes (test code = Lymphocytes) 26.1         20.0-40.0       

          



[facility] AllianceUgubuzyFODBVMVLZO1406-72-92 09:36:00





             Test Item    Value        Reference Range Interpretation Comments

 

             Segs (test code = Segs) 59.3         45.0-75.0                 



[facility] AllianceYsilkwmMQXTVWSRKB6526-98-17 09:36:00





             Test Item    Value        Reference Range Interpretation Comments

 

             Basophils (test code = 0.8          See_Comment                [Aut

omated message] The



             Basophils)                                          system which ge

nerated



                                                                 this result tra

nsmitted



                                                                 reference range

: <=1.0.



                                                                 The reference r

zhen was



                                                                 not used to int

erpret



                                                                 this result as



                                                                 normal/abnormal

.



[facility] AllianceItbssddSSTJSTYIFY9964-29-71 09:36:00





             Test Item    Value        Reference Range Interpretation Comments

 

             Monocytes (test code = Monocytes) 10.5         2.0-12.0            

      



[facility] AllianceCrubhcmGAUAYIJRXC6086-07-90 09:36:00





             Test Item    Value        Reference Range Interpretation Comments

 

             Eosinophils (test code = 3.3          See_Comment                [A

utomated message] The



             Eosinophils)                                        system which ge

nerated



                                                                 this result tra

nsmitted



                                                                 reference range

: <=4.0.



                                                                 The reference r

zhen was



                                                                 not used to int

erpret



                                                                 this result as



                                                                 normal/abnormal

.



[facility] AllianceBtvvuyzGEXYNHJXMT0881-13-12 09:36:00





             Test Item    Value        Reference Range Interpretation Comments

 

             Segs-Bands # (test code = Segs-Bands #) 3.9          1.5-8.1       

            



[place] [id number] 21:02:00





             Test Item    Value        Reference Range Interpretation Comments

 

             Vanco Tr TND (test code = Vanco Tr 15:30pm                         

       



             TND)                                                



[place] [id number] 21:02:00





             Test Item    Value        Reference Range Interpretation Comments

 

             Vanco Tr (test code = Vanco Tr) 9.1                                

    



[facility]2018-05-06 07:07:00





             Test Item    Value        Reference Range Interpretation Comments

 

             Glucose Lvl (test code = Glucose Lvl) 74           70-99           

          



[facility]2018-05-06 07:07:00





             Test Item    Value        Reference Range Interpretation Comments

 

             BUN (test code = BUN) 12           7-22                      



[place]6038-05-06 07:07:00





             Test Item    Value        Reference Range Interpretation Comments

 

             Creatinine Lvl (test code = Creatinine 0.75         0.50-1.40      

           



             Lvl)                                                



[place] 976038-05-06 07:07:00





             Test Item    Value        Reference Range Interpretation Comments

 

             eGFR (test code = eGFR) 140                                    



[place] 976038-05-06 07:07:00





             Test Item    Value        Reference Range Interpretation Comments

 

             Albumin Lvl (test code = Albumin Lvl) 2.9          3.5-5.0         

          



[place] 976038-05-06 07:07:00





             Test Item    Value        Reference Range Interpretation Comments

 

             Globulin (test code = Globulin) 4.4          2.7-4.2               

    



[place] 976038-05-06 07:07:00





             Test Item    Value        Reference Range Interpretation Comments

 

             A/G Ratio (test code = A/G Ratio) 0.7 1        0.7-1.6             

      



[place] [address]-05-06 07:07:00





             Test Item    Value        Reference Range Interpretation Comments

 

             Bili Total (test code = Bili Total) 0.4          0.2-1.3           

        



[place] 976038-05-06 07:07:00





             Test Item    Value        Reference Range Interpretation Comments

 

             Alk Phos (test code = Alk Phos) 71                           

    



[place] 976038-05-06 07:07:00





             Test Item    Value        Reference Range Interpretation Comments

 

             AST (test code = AST) 15           See_Comment                [Auto

mated message] The



                                                                 system which ge

nerated this



                                                                 result transmit

huma



                                                                 reference range

: <=37. The



                                                                 reference range

 was not



                                                                 used to interpr

et this



                                                                 result as zayda

l/abnormal.



[place] 976038-05-06 07:07:00





             Test Item    Value        Reference Range Interpretation Comments

 

             ALT (test code = ALT) 28           See_Comment                [Auto

mated message] The



                                                                 system which ge

nerated this



                                                                 result transmit

huma



                                                                 reference range

: <=65. The



                                                                 reference range

 was not



                                                                 used to interpr

et this



                                                                 result as zayda

l/abnormal.



[place] [address]-05-06 07:07:00





             Test Item    Value        Reference Range Interpretation Comments

 

             Potassium Lvl (test code = Potassium 4.4          3.5-5.1          

         



             Lvl)                                                



[place] 976038-05-06 07:07:00





             Test Item    Value        Reference Range Interpretation Comments

 

             Sodium Lvl (test code = Sodium Lvl) 147          135-145           

        



[facility]2018-05-06 07:07:00





             Test Item    Value        Reference Range Interpretation Comments

 

             CO2 (test code = CO2) 25           24-32                     



[place]6038-05-06 07:07:00





             Test Item    Value        Reference Range Interpretation Comments

 

             Chloride Lvl (test code = Chloride Lvl) 114                  

            



[place] 976038-05-06 07:07:00





             Test Item    Value        Reference Range Interpretation Comments

 

             B/C Ratio (test code = B/C Ratio) 16 1         6-25                

      



[place] 976038-05-06 07:07:00





             Test Item    Value        Reference Range Interpretation Comments

 

             Calcium Lvl (test code = Calcium Lvl) 8.7          8.5-10.5        

          



[place] 976038-05-06 07:07:00





             Test Item    Value        Reference Range Interpretation Comments

 

             AGAP (test code = AGAP) 12.4         10.0-20.0                 



[place] 976038-05-06 07:07:00





             Test Item    Value        Reference Range Interpretation Comments

 

             Total Protein (test code = Total 7.3          6.4-8.4              

     



             Protein)                                            



[place] 196448-05-06 07:07:00





             Test Item    Value        Reference Range Interpretation Comments

 

             Platelet (test code = Platelet) 345          133-450               

    



[facility] AllianceFkmdjlcSTWJBBDPVC3926-99-99 07:07:00





             Test Item    Value        Reference Range Interpretation Comments

 

             MPV (test code = MPV) 8.7          7.4-10.4                  



[place] 196448-05-06 07:07:00





             Test Item    Value        Reference Range Interpretation Comments

 

             WBC (test code = WBC) 6.9          3.7-10.4                  



[place] 196448-05-06 07:07:00





             Test Item    Value        Reference Range Interpretation Comments

 

             RBC (test code = RBC) 5.30         4.70-6.10                 



[place] 196448-05-06 07:07:00





             Test Item    Value        Reference Range Interpretation Comments

 

             MCHC (test code = MCHC) 32.8         32.0-36.0                 



[place] [address]-05-06 07:07:00





             Test Item    Value        Reference Range Interpretation Comments

 

             MCH (test code = MCH) 27.9 pg      27.0-31.0                 



[facility] AllianceBrzmwtsFVZOVDGBSL6922-00-60 07:07:00





             Test Item    Value        Reference Range Interpretation Comments

 

             Hgb (test code = Hgb) 14.8         14.0-18.0                 



[facility] AllianceWivsrsdDKVWJBFUYW3498-75-63 07:07:00





             Test Item    Value        Reference Range Interpretation Comments

 

             MCV (test code = MCV) 85.0         80.0-94.0                 



[facility] AllianceKqlfjahGUZVXBDXCC2750-31-88 07:07:00





             Test Item    Value        Reference Range Interpretation Comments

 

             Hct (test code = Hct) 45.1         42.0-54.0                 



[facility] AllianceFtuhqtlKKVZEDSDRP0150-75-92 07:07:00





             Test Item    Value        Reference Range Interpretation Comments

 

             RDW (test code = RDW) 17.5         11.5-14.5                 



[facility] AllianceJrkbytkDLZJOGAHPA5283-74-22 07:07:00





             Test Item    Value        Reference Range Interpretation Comments

 

             Eosinophils # (test code 0.2          See_Comment                [A

utomated message] The



             = Eosinophils #)                                        system whic

h generated



                                                                 this result tra

nsmitted



                                                                 reference range

: <=0.5.



                                                                 The reference r

zhen was



                                                                 not used to int

erpret



                                                                 this result as



                                                                 normal/abnormal

.



[facility] AllianceLgkjrbnPUAMIAMGNT8659-83-22 07:07:00





             Test Item    Value        Reference Range Interpretation Comments

 

             Lymphocytes # (test code = Lymphocytes 2.0          1.0-5.5        

           



             #)                                                  



[facility] AllianceZdsgaqmDHLBKYGPQX3243-48-89 07:07:00





             Test Item    Value        Reference Range Interpretation Comments

 

             Monocytes # (test code 0.7          See_Comment                [Aut

omated message] The



             = Monocytes #)                                        system which 

generated



                                                                 this result tra

nsmitted



                                                                 reference range

: <=0.8.



                                                                 The reference r

zhen was



                                                                 not used to int

erpret



                                                                 this result as



                                                                 normal/abnormal

.



[facility] AllianceVdxcocdMXXVMAFGPQ7599-30-99 07:07:00





             Test Item    Value        Reference Range Interpretation Comments

 

             Eosinophils (test code = 2.4          See_Comment                [A

utomated message] The



             Eosinophils)                                        system which ge

nerated



                                                                 this result tra

nsmitted



                                                                 reference range

: <=4.0.



                                                                 The reference r

zhen was



                                                                 not used to int

erpret



                                                                 this result as



                                                                 normal/abnormal

.



[facility] AllianceZvvcxteOZAPGRCKXE3517-61-61 07:07:00





             Test Item    Value        Reference Range Interpretation Comments

 

             Basophils (test code = 0.6          See_Comment                [Aut

omated message] The



             Basophils)                                          system which ge

nerated



                                                                 this result tra

nsmitted



                                                                 reference range

: <=1.0.



                                                                 The reference r

zhen was



                                                                 not used to int

erpret



                                                                 this result as



                                                                 normal/abnormal

.



[facility] All[id number] 07:07:00





             Test Item    Value        Reference Range Interpretation Comments

 

             Segs-Bands # (test code = Segs-Bands #) 4.0          1.5-8.1       

            



[facility] AllianceVkxgzvzCMOWIHWVBW3866-08-92 07:07:00





             Test Item    Value        Reference Range Interpretation Comments

 

             Monocytes (test code = Monocytes) 10.4         2.0-12.0            

      



[facility] AllianceZtafyfuPRVTHMINLL2955-39-23 07:07:00





             Test Item    Value        Reference Range Interpretation Comments

 

             RBC Morph (test code = Normal (18 2:07 AM)                     

      



             RBC Morph)                                          



[facility] AllianceQywrwmiQWVYBDGQID7808-10-31 07:07:00





             Test Item    Value        Reference Range Interpretation Comments

 

             Segs (test code = Segs) 58.1         45.0-75.0                 



[facility] AllianceBdkxmgrIPKMWSMBWE7474-06-46 07:07:00





             Test Item    Value        Reference Range Interpretation Comments

 

             Plt Morph (test code = Normal (18 2:07 AM)                     

      



             Plt Morph)                                          



[facility] AllianceMqjccprVJWBQKSSXH1020-73-03 07:07:00





             Test Item    Value        Reference Range Interpretation Comments

 

             Lymphocytes (test code = Lymphocytes) 28.5         20.0-40.0       

          



[facility] AllianceSlgynouOMNXCXIMAB0504-52-81 16:07:00





             Test Item    Value        Reference Range Interpretation Comments

 

             Basophils # (test code 0.1          See_Comment                [Aut

omated message] The



             = Basophils #)                                        system which 

generated



                                                                 this result tra

nsmitted



                                                                 reference range

: <=0.2.



                                                                 The reference r

zhen was



                                                                 not used to int

erpret



                                                                 this result as



                                                                 normal/abnormal

.



[facility] AlliancePeiycefKEBZDSUXDB1827-29-94 16:07:00





             Test Item    Value        Reference Range Interpretation Comments

 

             Polychrom (test code = Moderate *ABN*(18                       

    



             Polychrom)   11:07 AM)                              



[place] [id number] 06:43:00





             Test Item    Value        Reference Range Interpretation Comments

 

             Vanco Tr TND (test code = Vanco Tr TND) *                          

            



[place] [id number] 06:43:00





             Test Item    Value        Reference Range Interpretation Comments

 

             Vanco Tr (test code = Vanco Tr) 22.3                               

    



[facility]2018-05-04 11:45:00





             Test Item    Value        Reference Range Interpretation Comments

 

             Magnesium Lvl (test code = Magnesium 2.3          1.8-2.4          

         



             Lvl)                                                



[facility]2018-05-04 11:45:00





             Test Item    Value        Reference Range Interpretation Comments

 

             Phosphorus (test code = Phosphorus) 3.5          2.5-4.5           

        



[facility] AllianceBujyhwpRATSUOBALH7900-37-57 11:45:00





             Test Item    Value        Reference Range Interpretation Comments

 

             PT (test code = PT) 14.0 s       12.0-14.7                 



[facility] AllianceQpsqkvgJFLUCVOBNV2011-40-29 11:45:00





             Test Item    Value        Reference Range Interpretation Comments

 

             PTT (test code = PTT) 37.6 s       22.9-35.8                 



[facility] AllianceXzvgrndFVUYAMZRZI9996-40-70 11:45:00





             Test Item    Value        Reference Range Interpretation Comments

 

             INR (test code = INR) 1.08 1       0.85-1.17                 



[facility]goCOX[facility]ZZ[facility]ODW9642[facility]79 11:45:00





             Test Item    Value        Reference Range Interpretation Comments

 

             C-REACTIVE PROTEIN (test code = 13.1                               

    



             C-REACTIVE PROTEIN)                                        



[facility]ressZlkacxqZJDODPHLUT0927-68-91 11:45:00





             Test Item    Value        Reference Range Interpretation Comments

 

             Prealbumin (test code = Prealbumin) 25.2         18.0-45.0         

        



[facility]2018-05-04 03:06:00





             Test Item    Value        Reference Range Interpretation Comments

 

             Lactic Acid Lvl (test code = Lactic 0.9          0.5-2.2           

        



             Acid Lvl)                                           



[facility] AllianceJmvmmscWPEHTTDXJO8460-08-07 03:06:00





             Test Item    Value        Reference Range Interpretation Comments

 

             Sed Rate (test code = 5            See_Comment                [Auto

mated message] The



             Sed Rate)                                           system which ge

nerated this



                                                                 result transmit

huma



                                                                 reference range

: <=15. The



                                                                 reference range

 was not



                                                                 used to interpr

et this



                                                                 result as zayda

l/abnormal.



[facility]ressCqjzxnoBBWYGESOWY0961-12-50 03:06:00





             Test Item    Value        Reference Range Interpretation Comments

 

             C-REACTIVE PROTEIN (test code = 15.8                               

    



             C-REACTIVE PROTEIN)                                        



[facility] AllianceKayotffVCNYBHDTJY4583-01-05 00:44:00





             Test Item    Value        Reference Range Interpretation Comments

 

             PT (test code = PT) 13.0 s       12.0-14.7                 



[id number] 00:44:00





             Test Item    Value        Reference Range Interpretation Comments

 

             INR (test code = INR) 0.98 1       0.85-1.17                 



[facility] AllianceRvsqwriRXPMFQAYGF7966-26-85 00:44:00





             Test Item    Value        Reference Range Interpretation Comments

 

             PTT (test code = PTT) 33.2 s       22.9-35.8                 



[facility] [id number] 23:51:00





             Test Item    Value        Reference Range Interpretation Comments

 

             Antibody Scrn (test Negative (5/3/18 6:51                          

 



             code = Antibody Scrn) PM)                                    



[facility] [id number] 23:51:00





             Test Item    Value        Reference Range Interpretation Comments

 

             ABO/Rh (test code = ABO/Rh) AB POS                                 



[facility] AND [id number] 23:35:00





             Test Item    Value        Reference Range Interpretation Comments

 

             UA Urobilinogen (test code = UA 0.2          0.1-1.0               

    



             Urobilinogen)                                        



[facility] AND [id number] 23:35:00





             Test Item    Value        Reference Range Interpretation Comments

 

             UA Nitrite (test code Negative (5/3/18 6:35                        

   



             = UA Nitrite) PM)                                    



[facility] AND [id number] 23:35:00





             Test Item    Value        Reference Range Interpretation Comments

 

             UA Glucose (test code Negative (5/3/18 6:35                        

   



             = UA Glucose) PM)                                    



[facility] AND [id number] 23:35:00





             Test Item    Value        Reference Range Interpretation Comments

 

             UA Ketones (test code Negative *NA*(5/3/18                         

  



             = UA Ketones) 6:35 PM)                               



[facility] AND [id number] 23:35:00





             Test Item    Value        Reference Range Interpretation Comments

 

             UA Bili (test code = Negative *NA*(5/3/18                          

 



             UA Bili)     6:35 PM)                               



[facility] AND [id number] 23:35:00





             Test Item    Value        Reference Range Interpretation Comments

 

             UA Blood (test code = Trace *ABN*(5/3/18                           



             UA Blood)    6:35 PM)                               



[facility] AND [id number] 23:35:00





             Test Item    Value        Reference Range Interpretation Comments

 

             UA Leuk Est (test code Small *ABN*(5/3/18 6:35                     

      



             = UA Leuk Est) PM)                                    



[facility] [id number] 23:35:00





             Test Item    Value        Reference Range Interpretation Comments

 

             UA Spec Grav (test code = UA Spec 1.020 1                          

      



             Grav)                                               



[facility] AND [id number] 23:35:00





             Test Item    Value        Reference Range Interpretation Comments

 

             UA pH (test code = UA pH) 6.0 1        5.0-8.0                   



[facility] [id number] 23:35:00





             Test Item    Value        Reference Range Interpretation Comments

 

             UA Color (test code = Yellow *NA*(5/3/18 6:35                      

     



             UA Color)    PM)                                    



[facility] AND [id number] 23:35:00





             Test Item    Value        Reference Range Interpretation Comments

 

             UA Protein (test code = Trace *ABN*(5/3/18                         

  



             UA Protein)  6:35 PM)                               



[facility] AND [id number] 23:35:00





             Test Item    Value        Reference Range Interpretation Comments

 

             UA Turbidity (test code Slight Cloudy (5/3/18                      

     



             = UA Turbidity) 6:35 PM)                               



[facility] AND [id number] 23:35:00





             Test Item    Value        Reference Range Interpretation Comments

 

             UA Hyal Cast 0-2 (5/3/18 6:35 See_Comment                [Automated

 message]



             (test code = UA PM)                                    The system w

michelle



             Hyal Cast)                                          generated this 

result



                                                                 transmitted ref

erence



                                                                 range: <=2. The



                                                                 reference range

 was



                                                                 not used to int

erpret



                                                                 this result as



                                                                 normal/abnormal

.



[facility] AND [id number] 23:35:00





             Test Item    Value        Reference Range Interpretation Comments

 

             UA Bacteria (test code = UA Occasional /HPF                        

   



             Bacteria)                                           



[facility] AND [id number] 23:35:00





             Test Item    Value        Reference Range Interpretation Comments

 

             UA RBC (test code 11-20 /HPF   See_Comment                [Automate

d message] The



             = UA RBC)                                           system which ge

nerated



                                                                 this result tra

nsmitted



                                                                 reference range

: <=2. The



                                                                 reference range

 was not



                                                                 used to interpr

et this



                                                                 result as



                                                                 normal/abnormal

.



[facility] [id number] 23:35:00





             Test Item    Value        Reference Range Interpretation Comments

 

             UA Sq Epi (test code = UA Sq Occasional /LPF                       

    



             Epi)                                                



[facility] AND [id number] 23:35:00





             Test Item    Value        Reference Range Interpretation Comments

 

             UA WBC (test code = UA WBC)  /HPF                            



[facility] AllianceUlpcghmKTMXKLHQEZ8559-64-26 23:20:00





             Test Item    Value        Reference Range Interpretation Comments

 

             Basophils # (test code 0.1          See_Comment                [Aut

omated message] The



             = Basophils #)                                        system which 

generated



                                                                 this result tra

nsmitted



                                                                 reference range

: <=0.2.



                                                                 The reference r

zhen was



                                                                 not used to int

erpret



                                                                 this result as



                                                                 normal/abnormal

.



[facility] Al[id number] 23:20:00





             Test Item    Value        Reference Range Interpretation Comments

 

             Polychrom (test code = Polychrom) Slight                           

      



[facility] AllianceVwffsiqZVPJSNWMZJ0565-31-14 23:20:00





             Test Item    Value        Reference Range Interpretation Comments

 

             Plt Morph (test code = Normal (5/3/18 6:20 PM)                     

      



             Plt Morph)                                          



[facility] [id number] 16:22:00





             Test Item    Value        Reference Range Interpretation Comments

 

             CULTURE (BEAKER) (test No growth in 5 days                         

  



             code = 1095)                                        



BLOOD [id number] 16:22:00





             Test Item    Value        Reference Range Interpretation Comments

 

             CULTURE (BEAKER) (test No growth in 5 days                         

  



             code = 1095)                                        



(MANUAL DIFFERENTIAL)2017 22:29:00





             Test Item    Value        Reference Range Interpretation Comments

 

             TOTAL COUNTED (BEAKER) (test code =                                

        



             1351)                                               

 

             WBC MORPHOLOGY (BEAKER) (test code = Normal                        

         



             487)                                                

 

             PLT MORPHOLOGY (BEAKER) (test code = Normal                        

         



             486)                                                

 

             RBC MORPHOLOGY (BEAKER) (test code = Normal                        

         



             762)                                                



CBC W/PLT COUNT &amp; AUTO [id number] 22:28:00





             Test Item    Value        Reference Range Interpretation Comments

 

             WHITE BLOOD CELL COUNT (BEAKER) 7.3 K/ L     4.0-10.0              

    



             (test code = 775)                                        

 

             RED BLOOD CELL COUNT (BEAKER) 5.09 M/ L    4.20-5.80               

  



             (test code = 761)                                        

 

             HEMOGLOBIN (BEAKER) (test code = 13.0 GM/DL   13.0-16.8            

     



             410)                                                

 

             HEMATOCRIT (BEAKER) (test code = 42.3 %       40.0-50.0            

     



             411)                                                

 

             MEAN CORPUSCULAR VOLUME (BEAKER) 83.0 fL      82.0-98.0            

     



             (test code = 753)                                        

 

             MEAN CORPUSCULAR HEMOGLOBIN 25.6 pg      27.0-33.0    L            



             (BEAKER) (test code = 751)                                        

 

             MEAN CORPUSCULAR HEMOGLOBIN CONC 30.8 GM/DL   32.0-36.0    L       

     



             (BEAKER) (test code = 752)                                        

 

             RED CELL DISTRIBUTION WIDTH 18.0 %       10.3-14.2    H            



             (BEAKER) (test code = 412)                                        

 

             PLATELET COUNT (BEAKER) (test 331 K/CU MM  150-430                 

  



             code = 756)                                         

 

             MEAN PLATELET VOLUME (BEAKER) 8.5 fL       6.5-10.5                

  



             (test code = 754)                                        

 

             NUCLEATED RED BLOOD CELLS 0 /100 WBC   0-0                       



             (BEAKER) (test code = 413)                                        

 

             NEUTROPHILS RELATIVE PERCENT 65 %                                  

 



             (BEAKER) (test code = 429)                                        

 

             LYMPHOCYTES RELATIVE PERCENT 23 %                                  

 



             (BEAKER) (test code = 430)                                        

 

             MONOCYTES RELATIVE PERCENT 8 %                                    



             (BEAKER) (test code = 431)                                        

 

             EOSINOPHILS RELATIVE PERCENT 3 %                                   

 



             (BEAKER) (test code = 432)                                        

 

             BASOPHILS RELATIVE PERCENT 1 %                                    



             (BEAKER) (test code = 437)                                        

 

             NEUTROPHILS ABSOLUTE COUNT 4.75 K/ L    1.80-8.00                 



             (BEAKER) (test code = 670)                                        

 

             LYMPHOCYTES ABSOLUTE COUNT 1.68 K/ L    1.48-4.50                 



             (BEAKER) (test code = 414)                                        

 

             MONOCYTES ABSOLUTE COUNT (BEAKER) 0.55 K/ L    0.00-1.30           

      



             (test code = 415)                                        

 

             EOSINOPHILS ABSOLUTE COUNT 0.24 K/ L    0.00-0.50                 



             (BEAKER) (test code = 416)                                        

 

             BASOPHILS ABSOLUTE COUNT (BEAKER) 0.05 K/ L    0.00-0.20           

      



             (test code = 417)                                        



[id number] METABOLIC [id number] 11:11:00





             Test Item    Value        Reference Range Interpretation Comments

 

             SODIUM (BEAKER) 138 meq/L    136-145                   



             (test code = 381)                                        

 

             POTASSIUM (BEAKER) 4.0 meq/L    3.5-5.1                   



             (test code = 379)                                        

 

             CHLORIDE (BEAKER) 108 meq/L           H            



             (test code = 382)                                        

 

             CO2 (BEAKER) (test 23 meq/L     22-29                     



             code = 355)                                         

 

             BLOOD UREA NITROGEN 11 mg/dL     7-21                      



             (BEAKER) (test code                                        



             = 354)                                              

 

             CREATININE (BEAKER) 0.74 mg/dL   0.57-1.25                 



             (test code = 358)                                        

 

             GLUCOSE RANDOM 124 mg/dL           H            



             (BEAKER) (test code                                        



             = 652)                                              

 

             CALCIUM (BEAKER) 8.9 mg/dL    8.4-10.2                  



             (test code = 697)                                        

 

             EGFR (BEAKER) (test 150 mL/min/1.73                           ESTIM

ATED GFR IS



             code = 1092) sq m                                   NOT AS ACCURATE

 AS



                                                                 CREATININE



                                                                 CLEARANCE IN



                                                                 PREDICTING



                                                                 GLOMERULAR



                                                                 FILTRATION RATE

.



                                                                 ESTIMATED GFR I

S



                                                                 NOT APPLICABLE 

FOR



                                                                 DIALYSIS PATIEN

TS.



URINE [id number] 09:56:00





             Test Item    Value        Reference Range Interpretation Comments

 

             CULTURE (BEAKER) (test <10,000 col/mL skin                         

  



             code = 1095) jaime                                  



COMPREHENSIVE METABOLIC [id number] 08:49:00





             Test Item    Value        Reference Range Interpretation Comments

 

             TOTAL PROTEIN 7.3 gm/dL    6.0-8.3                   



             (BEAKER) (test code =                                        



             770)                                                

 

             ALBUMIN (BEAKER) 3.3 g/dL     3.5-5.0      L            



             (test code = 1145)                                        

 

             ALKALINE PHOSPHATASE 89 U/L                           



             (BEAKER) (test code =                                        



             346)                                                

 

             BILIRUBIN TOTAL 1.4 mg/dL    0.2-1.2      H            



             (BEAKER) (test code =                                        



             377)                                                

 

             SODIUM (BEAKER) (test 137 meq/L    136-145                   



             code = 381)                                         

 

             POTASSIUM (BEAKER) 3.7 meq/L    3.5-5.1                   



             (test code = 379)                                        

 

             CHLORIDE (BEAKER) 108 meq/L           H            



             (test code = 382)                                        

 

             CO2 (BEAKER) (test 17 meq/L     22-29        L            



             code = 355)                                         

 

             BLOOD UREA NITROGEN 12 mg/dL     7-21                      



             (BEAKER) (test code =                                        



             354)                                                

 

             CREATININE (BEAKER) 0.78 mg/dL   0.57-1.25                 



             (test code = 358)                                        

 

             GLUCOSE RANDOM 119 mg/dL           H            



             (BEAKER) (test code =                                        



             652)                                                

 

             CALCIUM (BEAKER) 8.6 mg/dL    8.4-10.2                  



             (test code = 697)                                        

 

             AST (SGOT) (BEAKER) 13 U/L       5-34                      



             (test code = 353)                                        

 

             ALT (SGPT) (BEAKER) 28 U/L       6-55                      



             (test code = 347)                                        

 

             EGFR (BEAKER) (test 141                                    ESTIMATE

D GFR IS



             code = 1092) mL/min/1.73 sq                           NOT AS ACCURA

TE AS



                          m                                      CREATININE



                                                                 CLEARANCE IN



                                                                 PREDICTING



                                                                 GLOMERULAR



                                                                 FILTRATION RATE

.



                                                                 ESTIMATED GFR I

S



                                                                 NOT APPLICABLE 

FOR



                                                                 DIALYSIS PATIEN

TS.



CBC W/PLT COUNT &amp; AUTO [id number] 08:46:00





             Test Item    Value        Reference Range Interpretation Comments

 

             WHITE BLOOD CELL COUNT (BEAKER) 9.4 K/ L     4.0-10.0              

    



             (test code = 775)                                        

 

             RED BLOOD CELL COUNT (BEAKER) 4.79 M/ L    4.20-5.80               

  



             (test code = 761)                                        

 

             HEMOGLOBIN (BEAKER) (test code = 12.8 GM/DL   13.0-16.8    L       

     



             410)                                                

 

             HEMATOCRIT (BEAKER) (test code = 40.0 %       40.0-50.0            

     



             411)                                                

 

             MEAN CORPUSCULAR VOLUME (BEAKER) 83.4 fL      82.0-98.0            

     



             (test code = 753)                                        

 

             MEAN CORPUSCULAR HEMOGLOBIN 26.7 pg      27.0-33.0    L            



             (BEAKER) (test code = 751)                                        

 

             MEAN CORPUSCULAR HEMOGLOBIN CONC 32.0 GM/DL   32.0-36.0            

     



             (BEAKER) (test code = 752)                                        

 

             RED CELL DISTRIBUTION WIDTH 18.2 %       10.3-14.2    H            



             (BEAKER) (test code = 412)                                        

 

             PLATELET COUNT (BEAKER) (test 313 K/CU MM  150-430                 

  



             code = 756)                                         

 

             MEAN PLATELET VOLUME (BEAKER) 8.6 fL       6.5-10.5                

  



             (test code = 754)                                        

 

             NUCLEATED RED BLOOD CELLS 0 /100 WBC   0-0                       



             (BEAKER) (test code = 413)                                        

 

             NEUTROPHILS RELATIVE PERCENT 70 %                                  

 



             (BEAKER) (test code = 429)                                        

 

             LYMPHOCYTES RELATIVE PERCENT 16 %                                  

 



             (BEAKER) (test code = 430)                                        

 

             MONOCYTES RELATIVE PERCENT 12 %                                   



             (BEAKER) (test code = 431)                                        

 

             EOSINOPHILS RELATIVE PERCENT 1 %                                   

 



             (BEAKER) (test code = 432)                                        

 

             BASOPHILS RELATIVE PERCENT 1 %                                    



             (BEAKER) (test code = 437)                                        

 

             NEUTROPHILS ABSOLUTE COUNT 6.54 K/ L    1.80-8.00                 



             (BEAKER) (test code = 670)                                        

 

             LYMPHOCYTES ABSOLUTE COUNT 1.50 K/ L    1.48-4.50                 



             (BEAKER) (test code = 414)                                        

 

             MONOCYTES ABSOLUTE COUNT (BEAKER) 1.13 K/ L    0.00-1.30           

      



             (test code = 415)                                        

 

             EOSINOPHILS ABSOLUTE COUNT 0.13 K/ L    0.00-0.50                 



             (BEAKER) (test code = 416)                                        

 

             BASOPHILS ABSOLUTE COUNT (BEAKER) 0.06 K/ L    0.00-0.20           

      



             (test code = 417)                                        



0.00URINALYSIS W/ [id number] 20:36:00





             Test Item    Value        Reference Range Interpretation Comments

 

             COLOR (BEAKER) (test code = 470) Yellow                            

     

 

             CLARITY (BEAKER) (test code = 469) Hazy                            

       

 

             SPECIFIC GRAVITY UA (BEAKER) (test 1.012        1.001-1.035        

       



             code = 468)                                         

 

             PH UA (BEAKER) (test code = 467) 5.5          5.0-8.0              

     

 

             PROTEIN UA (BEAKER) (test code = 100 mg/dL    Negative     A       

     



             464)                                                

 

             GLUCOSE UA (BEAKER) (test code = Negative     Negative             

     



             365)                                                

 

             KETONES UA (BEAKER) (test code = Negative     Negative             

     



             371)                                                

 

             BILIRUBIN UA (BEAKER) (test code = Negative     Negative           

       



             462)                                                

 

             BLOOD UA (BEAKER) (test code = 461) Moderate     Negative     A    

        

 

             NITRITE UA (BEAKER) (test code = Negative     Negative             

     



             465)                                                

 

             LEUKOCYTE ESTERASE UA (BEAKER) Large        Negative     A         

   



             (test code = 466)                                        

 

             UROBILINOGEN UA (BEAKER) (test code 3.0 mg/dL    0.2-1.0      H    

        



             = 463)                                              

 

             RBC UA (BEAKER) (test code = 519) 19 /HPF                          

      

 

             WBC UA (BEAKER) (test code = 520) 182 /HPF                         

      

 

             SOURCE(BEAKER) (test code = 2795)                                  

      



BASIC METABOLIC [id number] 17:00:00





             Test Item    Value        Reference Range Interpretation Comments

 

             SODIUM (BEAKER) 140 meq/L    136-145                   



             (test code = 381)                                        

 

             POTASSIUM (BEAKER) 3.7 meq/L    3.5-5.1                   



             (test code = 379)                                        

 

             CHLORIDE (BEAKER) 112 meq/L           H            



             (test code = 382)                                        

 

             CO2 (BEAKER) (test 17 meq/L     22-29        L            



             code = 355)                                         

 

             BLOOD UREA NITROGEN 23 mg/dL     7-21         H            



             (BEAKER) (test code                                        



             = 354)                                              

 

             CREATININE (BEAKER) 1.00 mg/dL   0.57-1.25                 



             (test code = 358)                                        

 

             GLUCOSE RANDOM 102 mg/dL                        



             (BEAKER) (test code                                        



             = 652)                                              

 

             CALCIUM (BEAKER) 8.7 mg/dL    8.4-10.2                  



             (test code = 697)                                        

 

             EGFR (BEAKER) (test 106 mL/min/1.73                           ESTIM

ATED GFR IS



             code = 1092) sq m                                   NOT AS ACCURATE

 AS



                                                                 CREATININE



                                                                 CLEARANCE IN



                                                                 PREDICTING



                                                                 GLOMERULAR



                                                                 FILTRATION RATE

.



                                                                 ESTIMATED GFR I

S



                                                                 NOT APPLICABLE 

FOR



                                                                 DIALYSIS PATIEN

TS.



Specimen slightly ictericCBC W/PLT COUNT &amp; AUTO [id number] 
12:18:00





             Test Item    Value        Reference Range Interpretation Comments

 

             WHITE BLOOD CELL COUNT (BEAKER) 16.4 K/ L    4.0-10.0     H        

    



             (test code = 775)                                        

 

             RED BLOOD CELL COUNT (BEAKER) 5.22 M/ L    4.20-5.80               

  



             (test code = 761)                                        

 

             HEMOGLOBIN (BEAKER) (test code = 14.4 GM/DL   13.0-16.8            

     



             410)                                                

 

             HEMATOCRIT (BEAKER) (test code = 42.9 %       40.0-50.0            

     



             411)                                                

 

             MEAN CORPUSCULAR VOLUME (BEAKER) 82.2 fL      82.0-98.0            

     



             (test code = 753)                                        

 

             MEAN CORPUSCULAR HEMOGLOBIN 27.5 pg      27.0-33.0                 



             (BEAKER) (test code = 751)                                        

 

             MEAN CORPUSCULAR HEMOGLOBIN CONC 33.5 GM/DL   32.0-36.0            

     



             (BEAKER) (test code = 752)                                        

 

             RED CELL DISTRIBUTION WIDTH 16.2 %       10.3-14.2    H            



             (BEAKER) (test code = 412)                                        

 

             PLATELET COUNT (BEAKER) (test 329 K/CU MM  150-430                 

  



             code = 756)                                         

 

             MEAN PLATELET VOLUME (BEAKER) 8.2 fL       6.5-10.5                

  



             (test code = 754)                                        

 

             NUCLEATED RED BLOOD CELLS 0 /100 WBC   0-0                       



             (BEAKER) (test code = 413)                                        

 

             NEUTROPHILS RELATIVE PERCENT 83 %                                  

 



             (BEAKER) (test code = 429)                                        

 

             LYMPHOCYTES RELATIVE PERCENT 7 %                                   

 



             (BEAKER) (test code = 430)                                        

 

             MONOCYTES RELATIVE PERCENT 9 %                                    



             (BEAKER) (test code = 431)                                        

 

             EOSINOPHILS RELATIVE PERCENT 0 %                                   

 



             (BEAKER) (test code = 432)                                        

 

             BASOPHILS RELATIVE PERCENT 0 %                                    



             (BEAKER) (test code = 437)                                        

 

             NEUTROPHILS ABSOLUTE COUNT 1.62 K/ L    1.80-8.00    L            



             (BEAKER) (test code = 670)                                        

 

             LYMPHOCYTES ABSOLUTE COUNT 1.15 K/ L    1.48-4.50    L            



             (BEAKER) (test code = 414)                                        

 

             MONOCYTES ABSOLUTE COUNT (BEAKER) 1.56 K/ L    0.00-1.30    H      

      



             (test code = 415)                                        

 

             EOSINOPHILS ABSOLUTE COUNT 0.01 K/ L    0.00-0.50                 



             (BEAKER) (test code = 416)                                        

 

             BASOPHILS ABSOLUTE COUNT (BEAKER) 0.02 K/ L    0.00-0.20           

      



             (test code = 417)                                        



(MANUAL DIFFERENTIAL)2017 12:18:00





             Test Item    Value        Reference Range Interpretation Comments

 

             TOTAL COUNTED (BEAKER) (test code =                                

        



             1351)                                               

 

             WBC MORPHOLOGY (BEAKER) (test code = Normal                        

         



             487)                                                

 

             PLT MORPHOLOGY (BEAKER) (test code = Normal                        

         



             486)                                                

 

             RBC MORPHOLOGY (BEAKER) (test code = Normal                        

         



             762)

## 2022-06-14 NOTE — EDPHYS
Physician Documentation                                                                           

 [facility]                                                                 

Name: Redd Dale Jr                                                                            

Age: 36 yrs                                                                                       

Sex: Male                                                                                         

: 1985                                                                                   

MRN: [id number]                                                                                   

Arrival Date: 2022                                                                          

Time: 02:33                                                                                       

Account#: [id number]                                                                            

Bed 25                                                                                            

Private MD:                                                                                       

KRZYSZTOF Physician Luis Cruz                                                                      

HPI:                                                                                              

                                                                                             

04:40 This 36 yrs old Black Male presents to ER via EMS with complaints of Nausea/Vomiting.   arlin 

04:40 The patient presents to the emergency department with nausea, vomiting, that is         arlin 

      continuous. Onset: The symptoms/episode began/occurred 2 day(s) ago. Possible causes:       

      unknown. The symptoms are aggravated by nothing. The symptoms are alleviated by             

      nothing. Associated signs and symptoms: The patient has no apparent associated signs or     

      symptoms. Severity of symptoms: At their worst the symptoms were mild in the emergency      

      department the symptoms are unchanged. The patient has experienced similar episodes in      

      the past, multiple times.                                                                   

                                                                                                  

Historical:                                                                                       

- Allergies:                                                                                      

03:44 Amoxicillin;                                                                            ke1 

03:44 Bactrim;                                                                                ke1 

03:44 Ciprofloxacin;                                                                          ke1 

03:44 CLAVULANIC ACID;                                                                        ke1 

03:44 Demerol;                                                                                ke1 

03:44 Doxycycline;                                                                            ke1 

03:44 Levofloxacin;                                                                           ke1 

03:44 Morphine;                                                                               ke1 

03:44 PENICILLINS;                                                                            ke1 

03:44 Toradol;                                                                                ke1 

03:44 TRIMETHOPRIM;                                                                           ke1 

03:44 Vancomycin;                                                                             ke1 

03:44 Zofran;                                                                                 ke1 

- PMHx:                                                                                           

03:44 Asthma; Cerebral Palsy; cluster headaches; decubitus ulcers on feet; GERD;              ke1 

      Hydrocephalus; Hypertension; spina bifida;                                                  

                                                                                                  

- Immunization history:: Adult Immunizations Client reports receiving the 2nd dose of             

  the Covid vaccine.                                                                              

- Social history:: Smoking status: Patient reports the use of cigarette tobacco                   

  products, smokes one-half pack cigarettes per day, Patient uses.                                

                                                                                                  

                                                                                                  

ROS:                                                                                              

04:41 Constitutional: Negative for fever, chills, and weight loss, Eyes: Negative for injury, arlin 

      pain, redness, and discharge, ENT: Negative for injury, pain, and discharge, Neck:          

      Negative for injury, pain, and swelling, Cardiovascular: Negative for chest pain,           

      palpitations, and edema, Respiratory: Negative for shortness of breath, cough,              

      wheezing, and pleuritic chest pain, Back: Negative for injury and pain, : Negative        

      for injury, bleeding, discharge, and swelling, MS/Extremity: Negative for injury and        

      deformity, Skin: Negative for injury, rash, and discoloration, Neuro: Negative for          

      headache, weakness, numbness, tingling, and seizure, Psych: Negative for depression,        

      anxiety, suicide ideation, homicidal ideation, and hallucinations, Allergy/Immunology:      

      Negative for hives, rash, and allergies, Endocrine: Negative for neck swelling,             

      polydipsia, polyuria, polyphagia, and marked weight changes, Hematologic/Lymphatic:         

      Negative for swollen nodes, abnormal bleeding, and unusual bruising.                        

04:41 Abdomen/GI: Positive for nausea and vomiting.                                               

04:41 Endocrine: Positive for hyperglycemia.                                                      

                                                                                                  

Exam:                                                                                             

04:41 Constitutional:  This is a well developed, well nourished patient who is awake, alert,  arlin 

      and in no acute distress. Head/Face:  Normocephalic, atraumatic. Eyes:  Pupils equal        

      round and reactive to light, extra-ocular motions intact.  Lids and lashes normal.          

      Conjunctiva and sclera are non-icteric and not injected.  Cornea within normal limits.      

      Periorbital areas with no swelling, redness, or edema. ENT:  Nares patent. No nasal         

      discharge, no septal abnormalities noted.  Tympanic membranes are normal and external       

      auditory canals are clear.  Oropharynx with no redness, swelling, or masses, exudates,      

      or evidence of obstruction, uvula midline.  Mucous membranes moist. Neck:  Trachea          

      midline, no thyromegaly or masses palpated, and no cervical lymphadenopathy.  Supple,       

      full range of motion without nuchal rigidity, or vertebral point tenderness.  No            

      Meningismus. Chest/axilla:  Normal chest wall appearance and motion.  Nontender with no     

      deformity.  No lesions are appreciated. Respiratory:  Lungs have equal breath sounds        

      bilaterally, clear to auscultation and percussion.  No rales, rhonchi or wheezes noted.     

       No increased work of breathing, no retractions or nasal flaring. Abdomen/GI:  Soft,        

      non-tender, with normal bowel sounds.  No distension or tympany.  No guarding or            

      rebound.  No evidence of tenderness throughout. Back:  No spinal tenderness.  No            

      costovertebral tenderness.  Full range of motion. Male :  Normal genitalia with no        

      discharge or lesions. Skin:  Warm, dry with normal turgor.  Normal color with no            

      rashes, no lesions, and no evidence of cellulitis. MS/ Extremity:  Pulses equal, no         

      cyanosis.  Neurovascular intact.  Full, normal range of motion. Neuro:  Awake and           

      alert, GCS 15, oriented to person, place, time, and situation.  Cranial nerves II-XII       

      grossly intact.  Motor strength 5/5 in all extremities.  Sensory grossly intact.            

      Cerebellar exam normal.  Normal gait. Psych:  Awake, alert, with orientation to person,     

      place and time.  Behavior, mood, and affect are within normal limits.                       

04:41 Cardiovascular: Rate: tachycardic, Rhythm: regular, Pulses: Pulses are 4+ in bilateral      

      radial, brachial, femoral, popliteal, posterior tibial and and dorsalis pedis               

      arteries.. Heart sounds: normal, Edema: is not appreciated, JVD: is not appreciated.        

04:41 ECG was reviewed by the Attending Physician.                                                

                                                                                                  

Vital Signs:                                                                                      

02:41  / 89; Pulse 115; Resp 18; Temp 98.6; Pulse Ox 97% on R/A; Weight 127.01 kg;      ke1 

      Height 4 ft. 11 in. (149.86 cm);                                                            

04:15  / 64; Pulse 106; Resp 18; Pulse Ox 98% on R/A;                                   ke1 

02:41 Body Mass Index 56.55 (127.01 kg, 149.86 cm)                                            ke1 

                                                                                                  

MDM:                                                                                              

02:38 Patient medically screened.                                                             [facility] 

                                                                                                  

                                                                                             

03:07 Order name: Basic Metabolic Panel                                                       [facility] 

                                                                                             

03:07 Order name: CBC with Diff; Complete Time: 04:31                                         [facility] 

                                                                                             

03:07 Order name: LFT's                                                                       [facility] 

                                                                                             

03:07 Order name: Magnesium                                                                   [facility] 

                                                                                             

03:07 Order name: NT PRO-BNP                                                                  [facility] 

                                                                                             

03:07 Order name: PT-INR; Complete Time: 04:31                                                [facility] 

                                                                                             

03:07 Order name: Troponin HS                                                                 [facility] 

                                                                                             

03:07 Order name: Ketone, Serum                                                               [facility] 

                                                                                             

03:24 Order name: Glucose, Ancillary Testing; Complete Time: 04:31                            [facility]

                                                                                             

04:07 Order name: ABG; Complete Time: 05:10                                                   ke1 

                                                                                             

04:17 Order name: Glucose, Ancillary Testing; Complete Time: 04:31                            [facility]

                                                                                             

05:16 Order name: Glucose, Ancillary Testing                                                  [facility]

                                                                                             

05:51 Order name: Glucose, Ancillary Testing                                                  [facility]

                                                                                             

06:45 Order name: Glucose, Ancillary Testing                                                  [facility]

                                                                                             

03:07 Order name: XRAY Chest (1 view)                                                         [facility] 

                                                                                             

07:11 Order name: COVID-19 SARS RT PCR (Document "Date of Onset" if Symptomatic)                

                                                                                             

10:03 Order name: COVID 19 CPL                                                                [facility]

                                                                                             

10:54 Order name: SARS-COV-2 RT PCR                                                           [facility]

                                                                                             

11:37 Order name: Glucose, Ancillary Testing                                                  [facility]

                                                                                             

16:32 Order name: Glucose, Ancillary Testing                                                  [facility]

                                                                                             

03:07 Order name: EKG; Complete Time: 03:07                                                   [facility] 

                                                                                             

03:07 Order name: Cardiac monitoring; Complete Time: 04:14                                    [facility] 

                                                                                             

03:07 Order name: EKG - Nurse/Tech; Complete Time: 04:14                                      [facility] 

                                                                                             

03:07 Order name: IV Saline Lock; Complete Time: 03:37                                        [facility] 

                                                                                             

03:07 Order name: Labs collected and sent; Complete Time: 03:37                               [facility] 

                                                                                             

03:07 Order name: O2 Per Protocol; Complete Time: 03:37                                       [facility] 

                                                                                             

03:07 Order name: O2 Sat Monitoring; Complete Time: 03:37                                     [facility] 

                                                                                                  

EC:41 Rate is 100 beats/min. Rhythm is regular. QRS Axis is Normal. FL interval is normal.    [facility] 

      QRS interval is normal. QT interval is normal. No Q waves. T waves are Normal. No ST        

      changes noted. Clinical impression: NSR w/ Non-specific ST/T Changes, Sinus                 

      tachycardia, and No evidence of ischemia. Interpreted by me. Reviewed by me.                

                                                                                                  

Administered Medications:                                                                         

03:15 Drug: NS 0.9% 1000 ml Route: IV; Rate: 1 bolus; Site: left forearm;                     ke1 

03:15 Drug: Insulin Regular Human 10 units {Co-Signature: as6 (Zane West RN).} Route:     ke1 

      IVP; Site: left forearm;                                                                    

03:15 Drug: NS 0.9% 1000 ml Route: IV; Rate: 125 ml/hr; Site: left forearm;                   ke1 

03:36 Not Given (Patient Refused; allergicc): Zofran (Ondansetron) 4 mg IVP once; over 2      ke1 

      minutes                                                                                     

04:12 Drug: Insulin Regular Human 10 units {Co-Signature: ke1 (Kouassi Ebrottie RN).} Route:  bb  

      IVP; Site: left forearm;                                                                    

05:05 Drug: NS 0.9% 1000 ml Route: IV; Rate: 1 bolus; Site: left forearm;                     ke1 

05:05 Drug: Phenergan (promethazine) 12.5 mg Route: IVP; Site: left forearm;                  ke1 

05:05 Drug: Dilaudid (HYDROmorphone) 1 mg Route: IVP; Site: left forearm;                     ke1 

05:09 Drug: Insulin Regular Human 10 units {Co-Signature: as6 (Zane West RN).} Route:     ke1 

      IVP; Site: left forearm;                                                                    

05:50 Drug: Insulin Regular Human 10 units {Co-Signature: vc1 (Kasey Agee RN).} Route:   ke1 

      Sub-Q; Site: left lower abdomen;                                                            

05:50 Drug: LanTUS (insulin glargine) 30 units Route: Sub-Q; Site: left lower abdomen;        ke1 

07:26 Drug: Dilaudid (HYDROmorphone) 1 mg Route: IVP; Site: left forearm;                     ke1 

07:26 Drug: Phenergan (promethazine) 12.5 mg Route: IVP; Site: left forearm;                  ke1 

                                                                                                  

                                                                                                  

Disposition Summary:                                                                              

22 04:45                                                                                    

Hospitalization Ordered                                                                           

      Hospitalization Status: Observation                                                     arlin 

      Provider: Domingo Mc cha 

      Condition: Fair                                                                         arlin 

      Problem: new                                                                            arlin 

      Symptoms: have improved                                                                 arlin 

      Bed/Room Type: Standard                                                                 arlin 

      Location: Telemetry/MedSurg (Inpatient)(22 17:43)                                   

      Room Assignment: [facility](22 17:43)                                                      

      Diagnosis                                                                                   

        - Type 2 diabetes mellitus with hyperglycemia                                         arlin 

        - Obesity, unspecified                                                                arlin 

        - Vomiting                                                                            arlin 

      Forms:                                                                                      

        - Medication Reconciliation Form                                                      arlin 

        - SBAR form                                                                           arlin 

Signatures:                                                                                       

Dispatcher [employer]                           Luis Hsu MD MD cha Ballard, Brenda, RN RN bb Smirch, Shelby, RN RN ss Garcia, Cindy, RN RN cg Ebrottie, Kouassi, RN RN   ke1                                                  

Zane West RN                             as6                                                  

Karla Gutierrez RN                          ke1                                                  

Kasey Agee RN                           vc1                                                  

                                                                                                  

Corrections: (The following items were deleted from the chart)                                    

06:28 04:45 Telemetry/MedSurg (observation) arlin                                               cg  

06:28 04:45 arlin                                                                               cg  

::28 [facility] ER HOLD cg                                                                   ss  

: ERHOLD- cg                                                                        ss  

                                                                                                  

**************************************************************************************************

## 2022-06-14 NOTE — P.HP
Certification for Inpatient


Patient admitted to: Inpatient


With expected LOS: >2 Midnights


Patient will require the following post-hospital care: Home Health Services


Practitioner: I am a practitioner with admitting privileges, knowledge of 

patient current condition, hospital course, and medical plan of care.


Services: Services provided to patient in accordance with Admission requirements

found in Title 42 Section 412.3 of the Code of Federal Regulations





Patient History


Date of Service: 06/14/22


Primary Care Provider: Jesusita


Reason for admission: HONK in Dm2


History of Present Illness: 





Patient is an office patient of mine.  He has a history of diabetes, Spinal 

Bifada, irritable bowel syndrome.  he has chronic pressure ulcers.  He has a 

chronic pain syndrome. Which he sees Dr. Lennon.  He has not been taking his 

insulin for the past few weeks per the patient.  States he has been out of 

syringes.  He states he thought he could get it some other way.  He has been 

missing visit in wound care and the office.  He has been non compliant with meds

and follow up throughout my professional relationship with him.   


Allergies





levofloxacin [From Levaquin] Allergy (Intermediate, Verified 03/14/22 12:15)


   Hives


morphine Allergy (Intermediate, Verified 03/14/22 12:15)


   Hives


sulfamethoxazole [From Bactrim] Allergy (Intermediate, Verified 03/14/22 12:15)


   Hives


ketorolac tromethamine [From Toradol] Allergy (Verified 03/14/22 12:15)


   Nausea/Vomiting


Penicillins Allergy (Verified 03/14/22 12:15)


   Hives/Rash


vancomycin Allergy (Verified 03/14/22 12:15)


   Hives


ondansetron [From Zofran (as hydrochloride)] Adverse Reaction (Mild, Verified 

03/14/22 12:15)


   Nausea/Vomiting


amoxicillin [From Augmentin] Adverse Reaction (Verified 03/14/22 12:15)


   Hives/Rash


ciprofloxacin Adverse Reaction (Verified 03/14/22 12:15)


   Nausea/Vomiting


doxycycline Adverse Reaction (Verified 03/14/22 12:15)


   Nausea/Vomiting


sesame seed Adverse Reaction (Verified 03/14/22 12:15)


   diarrhea


pop corn Adverse Reaction (Severe, Uncoded 03/14/22 12:15)


   diarrhea





Home Medications: 








Hydromorphone [Dilaudid] 4 mg PO Q8HP PRN 03/14/22 


Insulin Glargine,Hum.rec.anlog [Lantus] See Protocol SQ DAILY 03/14/22 








- Past Medical/Surgical History


Diabetic: No


-: Spina Bifida with hydrocephalus


-: Depression


-: Insomnia


-: Incontinence


-: GERD


-: Chronic pain syndrome


-: Muscle wasting


-: Paraplegia


-: Shunt revision


-: Cholecystectomy


-: Foot surgery


-: Hip sx BL


-: Bilateral hip surgery


-: Appendectomy


Psychosocial/ Personal History: Patient currently single.  He has no children.  

He is disabled.





- Family History


  ** Father


-: Hypertension





  ** Mother


-: Hypertension





- Social History


Alcohol use: Yes


CD- Drugs: No


Caffeine use: Yes





Review of Systems


10-point ROS is otherwise unremarkable


Gastrointestinal: Nausea


Integumentary: Other (pressure ulcers )





Physical Examination





- Vital Signs


Temperature: 98.8 F


Blood Pressure: 116/83


Pulse: 97


Respirations: 18


Pulse Ox (%): 94





- Physical Exam


General: Alert, In no apparent distress


HEENT: Atraumatic, PERRLA, Mucous membr. moist/pink, EOMI, Sclerae nonicteric


Neck: Supple, 2+ carotid pulse no bruit, No LAD, Without JVD or thyroid 

abnormality


Respiratory: Clear to auscultation bilaterally, Normal air movement


Cardiovascular: Regular rate/rhythm, Normal S1 S2


Gastrointestinal: Normal bowel sounds, No tenderness


Musculoskeletal: No tenderness


Integumentary: No rashes, Pressure ulcer


Neurological: Normal gait, Normal speech, Normal strength at 5/5 x4 extr, Normal

 tone, Normal affect


Lymphatics: No axilla or inguinal lymphadenopathy





- Studies


Laboratory Data (last 24 hrs)





06/14/22 03:26: PT 11.8, INR 1.07


06/14/22 03:26: WBC 10.1, Hgb 16.1, Hct 48.5, Plt Count 377


06/14/22 03:26: Sodium 132 L, Potassium 3.6, BUN 9, Creatinine 1.20, Glucose 822

 H*, Magnesium 1.9, Total Bilirubin 0.3, AST 23,  H, Alkaline Phosphatase

 147 H








Assessment and Plan





- Problems (Diagnosis)


(1) Hyperosmolar non-ketotic state due to type 2 diabetes mellitus


Current Visit: Yes   Status: Acute   


Plan: 


Secondary to non compliance with insulin.  Will get him on his home dosage of 

lantus 40units sq daily   Start the patient on fluids and sliding scale. 








(2) Chronic pain disorder


Onset Date: 11/03/17   Current Visit: No   Status: Chronic   


Plan: 


Will need to double check with Dr. Lennon who is his pain management physician.

   aware shows he gets 56 tab a month.  As stated above he can be 

manipulative








(3) Spina bifida


Onset Date: 11/03/17   Current Visit: No   Status: Chronic   


Plan: 


Will keep him on fall precautions. 


Qualifiers: 


   Spinal region: lumbosacral   Presence of hydrocephalus: unspecified 

hydrocephalus presence   Qualified Code(s): Q05.7 - Lumbar spina bifida without 

hydrocephalus   





(4) Stage II pressure ulcer of left ankle


Current Visit: No   Status: Chronic   


Plan: 


He has not been compliant with wound care.   The patient can be seen by Wound 

care.  Will go by those recomendations. 








(5) Non compliance w medication regimen


Current Visit: Yes   Status: Chronic   


Plan: 


Will see if we can have him follow up.  Our office does telehealth meetings.  

However he tends to go to ER quite frequently.  He has missed several visits and

 go to the ER.   Hopefully he will be more compliant with his medications.  he 

has had a recent DKA admission.  The patient responsibility is a big factor





Discharge Plan: Home


Plan to discharge in: 48 Hours





- Advance Directives


Does patient have a Living Will: No


Does patient have a Durable POA for Healthcare: No





- Code Status/Comfort Care


Code Status Assessed: Yes


Code Status: Full Code


Physician Review: Patient Assessed, Agree with Above Assessment and Plan


Critical Care: No


Time Spent Managing Pts Care (In Minutes): 70

## 2022-06-14 NOTE — RAD REPORT
EXAM DESCRIPTION:  XR Chest, 1 View

 

CLINICAL HISTORY:  The patient is 36 years old and is Male; COUGH

 

TECHNIQUE:  Single view of the chest.

 

COMPARISON:  No relevant prior studies available.

 

FINDINGS:  Lungs:   No pulmonary vascular congestion or consolidation.

  Pleural space:   Unremarkable.   No pneumothorax.

  Heart:   Unremarkable.   No cardiomegaly.

  Mediastinum:   Unremarkable.

  Bones/joints:   No acute fracture visualized.

  Tubes, lines and devices:    shunt catheter noted on the right with adherent calcification.

  Upper abdomen:   No free air in the visualized upper abdomen.

 

IMPRESSION:  No acute cardiopulmonary process identified.

 

Electronically signed by:   Judy Key MD   6/14/2022 4:48 AM CDT Workstation: [id number]

 

 

 

Due to temporary technical issues with the PACS/Fluency reporting system, reports are being signed by
 the in house radiologists without review as a courtesy to insure prompt reporting. The interpreting 
radiologist is fully responsible for the content of the report.

## 2022-06-15 LAB
BUN BLD-MCNC: 11 MG/DL (ref 7–18)
GLUCOSE SERPLBLD-MCNC: 315 MG/DL (ref 74–106)
HCT VFR BLD CALC: 40.8 % (ref 39.6–49)
LYMPHOCYTES # SPEC AUTO: 2.4 K/UL (ref 0.7–4.9)
PMV BLD: 8.7 FL (ref 7.6–11.3)
POTASSIUM SERPL-SCNC: 3.7 MMOL/L (ref 3.5–5.1)
RBC # BLD: 4.62 M/UL (ref 4.33–5.43)

## 2022-06-15 RX ADMIN — PROMETHAZINE HYDROCHLORIDE PRN MG: 25 INJECTION INTRAMUSCULAR; INTRAVENOUS at 18:24

## 2022-06-15 RX ADMIN — PROMETHAZINE HYDROCHLORIDE PRN MG: 25 INJECTION INTRAMUSCULAR; INTRAVENOUS at 08:54

## 2022-06-15 RX ADMIN — HYDROMORPHONE HYDROCHLORIDE PRN MG: 4 TABLET ORAL at 08:53

## 2022-06-15 RX ADMIN — PROMETHAZINE HYDROCHLORIDE PRN MG: 25 INJECTION INTRAMUSCULAR; INTRAVENOUS at 23:08

## 2022-06-15 RX ADMIN — HUMAN INSULIN SCH UNIT: 100 INJECTION, SOLUTION SUBCUTANEOUS at 08:54

## 2022-06-15 RX ADMIN — ENOXAPARIN SODIUM SCH MG: 40 INJECTION SUBCUTANEOUS at 08:53

## 2022-06-15 RX ADMIN — HUMAN INSULIN SCH UNIT: 100 INJECTION, SOLUTION SUBCUTANEOUS at 18:13

## 2022-06-15 RX ADMIN — HUMAN INSULIN SCH UNIT: 100 INJECTION, SOLUTION SUBCUTANEOUS at 13:01

## 2022-06-15 RX ADMIN — SODIUM CHLORIDE SCH MLS: 0.9 INJECTION, SOLUTION INTRAVENOUS at 21:12

## 2022-06-15 RX ADMIN — PROMETHAZINE HYDROCHLORIDE PRN MG: 25 INJECTION INTRAMUSCULAR; INTRAVENOUS at 13:01

## 2022-06-15 RX ADMIN — SODIUM CHLORIDE SCH MLS: 0.9 INJECTION, SOLUTION INTRAVENOUS at 04:39

## 2022-06-15 RX ADMIN — HYDROMORPHONE HYDROCHLORIDE PRN MG: 4 TABLET ORAL at 23:08

## 2022-06-15 RX ADMIN — Medication SCH ML: at 21:14

## 2022-06-15 RX ADMIN — INSULIN GLARGINE SCH UNIT: 100 INJECTION, SOLUTION SUBCUTANEOUS at 08:54

## 2022-06-15 RX ADMIN — SODIUM CHLORIDE SCH MLS: 0.9 INJECTION, SOLUTION INTRAVENOUS at 13:06

## 2022-06-15 RX ADMIN — HUMAN INSULIN SCH UNIT: 100 INJECTION, SOLUTION SUBCUTANEOUS at 21:14

## 2022-06-15 RX ADMIN — Medication SCH ML: at 08:54

## 2022-06-15 RX ADMIN — Medication SCH APPL: at 14:18

## 2022-06-15 NOTE — P.PN
Subjective


Date of Service: 06/15/22


Primary Care Provider: Jesusita


Chief Complaint: HONK in Dm2


Subjective: Improving





Review of Systems


10-point ROS is otherwise unremarkable


Gastrointestinal: Nausea





Physical Examination





- Vital Signs


Temperature: 98.2 F


Blood Pressure: 131/73


Pulse: 83


Respirations: 18


Pulse Ox (%): 96





- Physical Exam


General: Alert, In no apparent distress


HEENT: Atraumatic, PERRLA, EOMI


Neck: Supple, JVD not distended


Respiratory: Clear to auscultation bilaterally, Normal air movement


Cardiovascular: Regular rate/rhythm, Normal S1 S2


Gastrointestinal: Normal bowel sounds, No tenderness


Musculoskeletal: No tenderness


Integumentary: No rashes


Neurological: Normal speech, Normal tone, Normal affect


Lymphatics: No axilla or inguinal lymphadenopathy





Assessment And Plan





- Current Problems (Diagnosis)


(1) Hyperosmolar non-ketotic state due to type 2 diabetes mellitus


Current Visit: Yes   Status: Acute   


Plan: 


Secondary to non compliance with insulin.  Will get him on his home dosage of 

lantus 40units sq daily   Start the patient on fluids and sliding scale. 


6/15


Will add januvia to his medications. 








(2) Chronic pain disorder


Onset Date: 11/03/17   Current Visit: No   Status: Chronic   


Plan: 


Will need to double check with Dr. Lennon who is his pain management physician.

  aware shows he gets 56 tab a month.  As stated above he can be manipulative








(3) Spina bifida


Onset Date: 11/03/17   Current Visit: No   Status: Chronic   


Plan: 


Will keep him on fall precautions. 


Qualifiers: 


   Spinal region: lumbosacral   Presence of hydrocephalus: unspecified 

hydrocephalus presence   Qualified Code(s): Q05.7 - Lumbar spina bifida without 

hydrocephalus   





(4) Stage II pressure ulcer of left ankle


Current Visit: No   Status: Chronic   


Plan: 


He has not been compliant with wound care.   The patient can be seen by Wound 

care.  Will go by those recomendations. 








(5) Non compliance w medication regimen


Current Visit: Yes   Status: Chronic   


Plan: 


Will see if we can have him follow up.  Our office does telehealth meetings.  

However he tends to go to ER quite frequently.  He has missed several visits and

go to the ER.   Hopefully he will be more compliant with his medications.  he 

has had a recent DKA admission.  The patient responsibility is a big factor


6/15


Have verified his pain dosage with Dr. Lennon.  He gets dilaudid 4mg po q12hrs 

prn.   No more will be given. 





Physician Review: Patient Assessed, Agree with Above Assessment and Plan


Critical Care: No


Time Spent Managing PTS Care (In Minutes): 20

## 2022-06-16 VITALS — DIASTOLIC BLOOD PRESSURE: 60 MMHG | TEMPERATURE: 97.8 F | SYSTOLIC BLOOD PRESSURE: 123 MMHG

## 2022-06-16 LAB
ALBUMIN SERPL BCP-MCNC: 2.7 G/DL (ref 3.4–5)
ALP SERPL-CCNC: 113 U/L (ref 45–117)
ALT SERPL W P-5'-P-CCNC: 98 U/L (ref 12–78)
AST SERPL W P-5'-P-CCNC: 22 U/L (ref 15–37)
BUN BLD-MCNC: 8 MG/DL (ref 7–18)
GLUCOSE SERPLBLD-MCNC: 264 MG/DL (ref 74–106)
POTASSIUM SERPL-SCNC: 3.7 MMOL/L (ref 3.5–5.1)

## 2022-06-16 RX ADMIN — HYDROMORPHONE HYDROCHLORIDE PRN MG: 4 TABLET ORAL at 09:14

## 2022-06-16 RX ADMIN — PROMETHAZINE HYDROCHLORIDE PRN MG: 25 INJECTION INTRAMUSCULAR; INTRAVENOUS at 05:52

## 2022-06-16 RX ADMIN — HUMAN INSULIN SCH UNIT: 100 INJECTION, SOLUTION SUBCUTANEOUS at 09:09

## 2022-06-16 RX ADMIN — HUMAN INSULIN SCH UNIT: 100 INJECTION, SOLUTION SUBCUTANEOUS at 12:10

## 2022-06-16 RX ADMIN — SODIUM CHLORIDE SCH MLS: 0.9 INJECTION, SOLUTION INTRAVENOUS at 05:49

## 2022-06-16 RX ADMIN — PROMETHAZINE HYDROCHLORIDE PRN MG: 25 INJECTION INTRAMUSCULAR; INTRAVENOUS at 10:59

## 2022-06-16 RX ADMIN — ENOXAPARIN SODIUM SCH MG: 40 INJECTION SUBCUTANEOUS at 09:07

## 2022-06-16 RX ADMIN — Medication SCH ML: at 09:10

## 2022-06-16 RX ADMIN — INSULIN GLARGINE SCH UNIT: 100 INJECTION, SOLUTION SUBCUTANEOUS at 09:08

## 2022-06-16 RX ADMIN — Medication SCH APPL: at 12:11

## 2022-06-16 NOTE — P.DS
Admission Date: 06/14/22


Discharge Date: 06/16/22


Primary Care Provider: Jesusita


Disposition: ROUTINE DISCHARGE


Discharge Condition: GOOD


Reason for Admission: HONK in Dm2





- Problems


(1) Hyperosmolar non-ketotic state due to type 2 diabetes mellitus


Current Visit: Yes   Status: Acute   





(2) Chronic pain disorder


Onset Date: 11/03/17   Current Visit: No   Status: Chronic   





(3) Spina bifida


Onset Date: 11/03/17   Current Visit: No   Status: Chronic   


Qualifiers: 


   Spinal region: lumbosacral   Presence of hydrocephalus: unspecified 

hydrocephalus presence   Qualified Code(s): Q05.7 - Lumbar spina bifida without 

hydrocephalus   





(4) Stage II pressure ulcer of left ankle


Current Visit: No   Status: Chronic   





(5) Non compliance w medication regimen


Current Visit: Yes   Status: Chronic   


Brief History of Present Illness: 





Patient is an office patient of mine.  He has a history of diabetes, Spinal 

Bifada, irritable bowel syndrome.  he has chronic pressure ulcers.  He has a 

chronic pain syndrome. Which he sees Dr. Lennon.  He has not been taking his 

insulin for the past few weeks per the patient.  States he has been out of 

syringes.  He states he thought he could get it some other way.  He has been 

missing visit in wound care and the office.  He has been non compliant with meds

and follow up throughout my professional relationship with him.   


Hospital Course: 





Patient was admitted for hyperglycemia.  He  had not been compliant with is 

insulin. Was treated with diet, insulin and fluids.  Today he is in the 200's   

Which is very good for him Will discharge him home and have him follow up with 

Telehealth in our office. 


Vital Signs/Physical Exam: 














Temp Pulse Resp BP Pulse Ox


 


 97.6 F   81   16   119/69   98 


 


 06/16/22 08:00  06/16/22 08:00  06/16/22 09:14  06/16/22 08:00  06/16/22 09:14








General: Alert, In no apparent distress


HEENT: Atraumatic, PERRLA, EOMI


Neck: Supple, JVD not distended


Respiratory: Clear to auscultation bilaterally, Normal air movement


Cardiovascular: Regular rate/rhythm, Normal S1 S2


Gastrointestinal: Normal bowel sounds, No tenderness


Musculoskeletal: No tenderness


Integumentary: No rashes


Neurological: Normal speech, Normal tone, Normal affect


Lymphatics: No axilla or inguinal lymphadenopathy


Laboratory Data at Discharge: 














WBC  9.0 K/uL (4.3-10.9)   06/15/22  05:37    


 


Hgb  13.8 g/dL (13.6-17.9)   06/15/22  05:37    


 


Hct  40.8 % (39.6-49.0)  D 06/15/22  05:37    


 


Plt Count  306 K/uL (152-406)   06/15/22  05:37    


 


PT  11.8 SECONDS (9.5-12.5)   06/14/22  03:26    


 


INR  1.07   06/14/22  03:26    


 


Sodium  139 mmol/L (136-145)   06/16/22  06:19    


 


Potassium  3.7 mmol/L (3.5-5.1)   06/16/22  06:19    


 


BUN  8 mg/dL (7-18)   06/16/22  06:19    


 


Creatinine  0.65 mg/dL (0.55-1.3)   06/16/22  06:19    


 


Glucose  264 mg/dL ()  H  06/16/22  06:19    


 


Magnesium  1.9 mg/dL (1.8-2.4)   06/14/22  03:26    


 


Total Bilirubin  0.3 mg/dL (0.2-1.0)   06/16/22  06:19    


 


AST  22 U/L (15-37)   06/16/22  06:19    


 


ALT  98 U/L (12-78)  H  06/16/22  06:19    


 


Alkaline Phosphatase  113 U/L ()   06/16/22  06:19    








Home Medications: 








Hydromorphone [Dilaudid] 4 mg PO Q8HP PRN 03/14/22 


Insulin Glargine,Hum.rec.anlog [Semglee] 40 unit SQ DAILY 90 Days #40 ml 

06/16/22 


Yordan [Yordan*] 1 pkt PO BID 90 Days #90 powd.pack 06/16/22 


Medihoney [Medihoney Woundcare Gel*] 1 appl TOP DAILY 90 Days #3 tube 06/16/22 


Pen Needle, Diabetic, Safety [Assure Id Pen Needle] 1 each MC DAILY 90 Days #90 

dis.needle 06/16/22 





New Medications: 


Pen Needle, Diabetic, Safety [Assure Id Pen Needle] 1 each MC DAILY 90 Days #90 

dis.needle


Yordan [Yordan*] 1 pkt PO BID 90 Days #90 powd.pack


Medihoney [Medihoney Woundcare Gel*] 1 appl TOP DAILY 90 Days #3 tube


Insulin Glargine,Hum.rec.anlog [Semglee] 40 unit SQ DAILY 90 Days #40 ml


Diet: ADA


Activity: Ad rickie


Followup: 


Unknown,U [Primary Care Provider] - 


Physician Review: Patient Assessed, Agree with Above Assessment and Plan


Time spent managing pt's care (in minutes): 30

## 2022-07-18 ENCOUNTER — HOSPITAL ENCOUNTER (EMERGENCY)
Dept: HOSPITAL 97 - ER | Age: 37
Discharge: HOME | End: 2022-07-18
Payer: COMMERCIAL

## 2022-07-18 VITALS — SYSTOLIC BLOOD PRESSURE: 111 MMHG | DIASTOLIC BLOOD PRESSURE: 67 MMHG | OXYGEN SATURATION: 99 %

## 2022-07-18 VITALS — TEMPERATURE: 98.2 F

## 2022-07-18 DIAGNOSIS — I10: ICD-10-CM

## 2022-07-18 DIAGNOSIS — Z88.8: ICD-10-CM

## 2022-07-18 DIAGNOSIS — Z88.5: ICD-10-CM

## 2022-07-18 DIAGNOSIS — F17.210: ICD-10-CM

## 2022-07-18 DIAGNOSIS — Z88.1: ICD-10-CM

## 2022-07-18 DIAGNOSIS — J45.909: ICD-10-CM

## 2022-07-18 DIAGNOSIS — Z88.3: ICD-10-CM

## 2022-07-18 DIAGNOSIS — T83.098A: Primary | ICD-10-CM

## 2022-07-18 DIAGNOSIS — Z88.0: ICD-10-CM

## 2022-07-18 PROCEDURE — 51702 INSERT TEMP BLADDER CATH: CPT

## 2022-07-18 PROCEDURE — 99284 EMERGENCY DEPT VISIT MOD MDM: CPT

## 2022-07-18 NOTE — EDPHYS
Physician Documentation                                                                           

 [facility]                                                                 

Name: Redd Dale Jr                                                                            

Age: 36 yrs                                                                                       

Sex: Male                                                                                         

: 1985                                                                                   

MRN: [id number]                                                                                   

Arrival Date: 2022                                                                          

Time: 16:01                                                                                       

Account#: [id number]                                                                            

Bed 8                                                                                             

Private MD: Domingo Mc ED Physician Anuel Berry                                                                         

HPI:                                                                                              

                                                                                             

17:50 This 36 yrs old Black Male presents to ER via Wheelchair with complaints of Back Pain,  ms3 

      Problem With Urinary Catheter.                                                              

17:50 The patient presents with a Contreras catheter problem, is not draining. Onset: The         ms3 

      symptoms/episode began/occurred 3 day(s) ago. Modifying factors: The symptoms are           

      alleviated by nothing, the symptoms are aggravated by nothing. Associated signs and         

      symptoms: Pertinent negatives: fever. Severity of symptoms: At their worst the symptoms     

      were moderate, in the emergency department the symptoms are unchanged.                      

                                                                                                  

Historical:                                                                                       

- Allergies:                                                                                      

16:49 Amoxicillin;                                                                            vg1 

16:49 Bactrim;                                                                                vg1 

16:49 Ciprofloxacin;                                                                          vg1 

16:49 CLAVULANIC ACID;                                                                        vg1 

16:49 Demerol;                                                                                vg1 

16:49 Doxycycline;                                                                            vg1 

16:49 Levofloxacin;                                                                           vg1 

16:49 Morphine;                                                                               vg1 

16:49 PENICILLINS;                                                                            vg1 

16:49 Toradol;                                                                                vg1 

16:49 TRIMETHOPRIM;                                                                           vg1 

16:49 Vancomycin;                                                                             vg1 

16:49 Zofran;                                                                                 vg1 

- Home Meds:                                                                                      

16:49 Celexa 20 mg Oral tab 1 tab once daily [Active]; fentanyl 25 mcg/hr Topical pt72 1      vg1 

      patch every 72 hours [Active]; Pepcid 20 mg Oral tab 1 tab once daily [Active];             

      Percocet  mg Oral tab 1 tab every 6 hours [Active]; Reglan 10 mg Oral tab 1 tab       

      once daily [Active]; Wellbutrin  mg Oral TbER 1 tab 2 times per day [Active];         

      Dilaudid [Active];                                                                          

- PMHx:                                                                                           

16:49 Asthma; Cerebral Palsy; cluster headaches; decubitus ulcers on feet; GERD;              vg1 

      Hydrocephalus; Hypertension; spina bifida;                                                  

                                                                                                  

- Immunization history:: Client reports receiving the 2nd dose of the Covid vaccine.              

- Social history:: Smoking status: Patient reports the use of cigarette tobacco                   

  products, smokes one pack cigarettes per day.                                                   

                                                                                                  

                                                                                                  

ROS:                                                                                              

17:50 Constitutional: Negative for fever, and chills. Neck: Negative for injury, pain, and    ms3 

      swelling, Cardiovascular: Negative for chest pain, and palpitations. Respiratory:           

      Negative for shortness of breath, cough, wheezing, and pleuritic chest pain.                

17:50 Back:                                                                                       

17:50 : Positive for Cath not draining.                                                         

17:50 All other systems are negative.                                                             

                                                                                                  

Exam:                                                                                             

17:50 Constitutional:  This is a well developed, well nourished patient who is awake, alert,  ms3 

      and in no acute distress. Head/Face:  Normocephalic, atraumatic. Neck:  Trachea             

      midline, no cervical lymphadenopathy.  Supple, full range of motion without nuchal          

      rigidity, or vertebral point tenderness.  No Meningismus. Chest/axilla:  Normal chest       

      wall appearance and motion.  Nontender with no deformity.   Cardiovascular:  Regular        

      rate and rhythm with a normal S1 and S2.  No gallops, murmurs, or rubs.  Normal PMI, no     

      JVD.  No pulse deficits. Respiratory:  Lungs have equal breath sounds bilaterally,          

      clear to auscultation and percussion.  No rales, rhonchi or wheezes noted.  No              

      increased work of breathing, no retractions or nasal flaring. Abdomen/GI:  Soft,            

      non-tender, with normal bowel sounds.  No distension or tympany.  No guarding or            

      rebound.  No evidence of tenderness throughout. Psych:  Awake, alert, with orientation      

      to person, place and time.  Behavior, mood, and affect are within normal limits.            

                                                                                                  

Vital Signs:                                                                                      

16:46  / 77; Pulse 104; Resp 16; Temp 98.2; Pulse Ox 97% on R/A; Weight 127.46 kg;      vg1 

      Height 4 ft. 11 in. (149.86 cm); Pain 8/10;                                                 

19:00  / 81; Pulse 91; Resp 18 S; Pulse Ox 96% on R/A;                                  as6 

21:00  / 67; Pulse 88; Resp 16 S; Pulse Ox 99% on R/A;                                  as6 

16:46 Body Mass Index 56.75 (127.46 kg, 149.86 cm)                                            vg1 

                                                                                                  

MDM:                                                                                              

17:03 Patient medically screened.                                                             ms3 

17:50 ED course: Case discussed with Dr Mc prior to patient's arrival. Patient will need   ms3 

      contreras flushed or replaced..                                                                 

19:30 ED course: Signed out to me by Dr. Gonsalez, plan was to dc home after suprapubic catheter  rn  

      changed. .                                                                                  

20:53 Differential diagnosis: suprapubic catheter problem. Data reviewed: vital signs, nurses rn  

      notes, and as a result, I will discharge patient. Counseling: I had a detailed              

      discussion with the patient and/or guardian regarding: the historical points, exam          

      findings, and any diagnostic results supporting the discharge/admit diagnosis, the need     

      for outpatient follow up, to return to the emergency department if symptoms worsen or       

      persist or if there are any questions or concerns that arise at home. Response to           

      treatment: the patient's symptoms have markedly improved after treatment, and as a          

      result, I will discharge patient.                                                           

                                                                                                  

                                                                                             

16:47 Order name: Misc. Order: Change contreras catheter; Complete Time: 20:29                    ms3 

                                                                                                  

Administered Medications:                                                                         

No medications were administered                                                                  

                                                                                                  

                                                                                                  

Disposition Summary:                                                                              

22 20:54                                                                                    

Discharge Ordered                                                                                 

      Location: Home                                                                          rn  

      Problem: new                                                                            rn  

      Symptoms: have improved                                                                 rn  

      Condition: Stable                                                                       rn  

      Diagnosis                                                                                   

        - Other mechanical complication of urinary (indwelling) catheter                      rn  

      Followup:                                                                               rn  

        - With: Domingo Mc MD                                                                  

        - When: As needed                                                                          

        - Reason: Recheck today's complaints, Re-evaluation by your physician                      

      Discharge Instructions:                                                                     

        - Discharge Summary Sheet                                                             ms3 

        - Suprapubic Catheter Home Guide                                                      ms3 

      Forms:                                                                                      

        - Medication Reconciliation Form                                                      rn  

        - Thank You Letter                                                                    rn  

        - Antibiotic Education                                                                rn  

        - Prescription Opioid Use                                                             rn  

Signatures:                                                                                       

Anuel Berry MD MD rn Garcia, Victoria, RN                    RN   Siddharth Montez DO DO   ms3                                                  

Manuel Larson                                jl9                                                  

                                                                                                  

Corrections: (The following items were deleted from the chart)                                    

16:26 16:15 Misc. Order ordered. ms3                                                          jl9 

                                                                                                  

**************************************************************************************************

## 2022-07-18 NOTE — ER
Nurse's Notes                                                                                     

 [facility]                                                                 

Name: Redd Dale Jr                                                                            

Age: 36 yrs                                                                                       

Sex: Male                                                                                         

: 1985                                                                                   

MRN: [id number]                                                                                   

Arrival Date: 2022                                                                          

Time: 16:01                                                                                       

Account#: [id number]                                                                            

Bed 8                                                                                             

Private MD: Domingo Mc                                                                         

Diagnosis: Other mechanical complication of urinary (indwelling) catheter                         

                                                                                                  

Presentation:                                                                                     

                                                                                             

16:46 Chief complaint: Patient states: Having issues with the suprapubic catheter for 2 days  vg1 

      with NV and fever. Coronavirus screen: Vaccine status: Patient reports receiving the        

      2nd dose of the covid vaccine. Client denies travel out of the [place] in the last 14          

      days. Ebola Screen: Patient denies exposure to infectious person. Patient denies travel     

      to an Ebola-affected area in the 21 days before illness onset. Initial Sepsis Screen:       

      Does the patient meet any 2 criteria? HR > 90 bpm. Does the patient have a suspected        

      source of infection? No. Patient's initial sepsis screen is negative. Risk Assessment:      

      Do you want to hurt yourself or someone else? Patient reports no desire to harm self or     

      others. Onset of symptoms was 2022.                                                

16:46 Method Of Arrival: Wheelchair                                                           vg1 

16:46 Acuity: AYESHA 3                                                                           vg1 

                                                                                                  

Triage Assessment:                                                                                

16:49 General: Appears uncomfortable, Behavior is calm, cooperative. Pain: Complains of pain  vg1 

      in pelvis Pain currently is 8 out of 10 on a pain scale. Pain began 2-3 days ago.           

      Musculoskeletal:.                                                                           

                                                                                                  

Historical:                                                                                       

- Allergies:                                                                                      

16:49 Amoxicillin;                                                                            vg1 

16:49 Bactrim;                                                                                vg1 

16:49 Ciprofloxacin;                                                                          vg1 

16:49 CLAVULANIC ACID;                                                                        vg1 

16:49 Demerol;                                                                                vg1 

16:49 Doxycycline;                                                                            vg1 

16:49 Levofloxacin;                                                                           vg1 

16:49 Morphine;                                                                               vg1 

16:49 PENICILLINS;                                                                            vg1 

16:49 Toradol;                                                                                vg1 

16:49 TRIMETHOPRIM;                                                                           vg1 

16:49 Vancomycin;                                                                             vg1 

16:49 Zofran;                                                                                 vg1 

- Home Meds:                                                                                      

16:49 Celexa 20 mg Oral tab 1 tab once daily [Active]; fentanyl 25 mcg/hr Topical pt72 1      vg1 

      patch every 72 hours [Active]; Pepcid 20 mg Oral tab 1 tab once daily [Active];             

      Percocet  mg Oral tab 1 tab every 6 hours [Active]; Reglan 10 mg Oral tab 1 tab       

      once daily [Active]; Wellbutrin  mg Oral TbER 1 tab 2 times per day [Active];         

      Dilaudid [Active];                                                                          

- PMHx:                                                                                           

16:49 Asthma; Cerebral Palsy; cluster headaches; decubitus ulcers on feet; GERD;              vg1 

      Hydrocephalus; Hypertension; spina bifida;                                                  

                                                                                                  

- Immunization history:: Client reports receiving the 2nd dose of the Covid vaccine.              

- Social history:: Smoking status: Patient reports the use of cigarette tobacco                   

  products, smokes one pack cigarettes per day.                                                   

                                                                                                  

                                                                                                  

Screenin:00 Abuse screen: Denies threats or abuse. Denies injuries from another. Nutritional        bp  

      screening: No deficits noted. Tuberculosis screening: No symptoms or risk factors           

      identified. Fall Risk None identified.                                                      

                                                                                                  

Assessment:                                                                                       

17:00 General: SEE TRIAGE NOTE.                                                               bp  

17:30 Reassessment: PT REQUEST FOR PAIN MEDICATION CONVEYED TO PROVIDER. MEDICATION OFFERED.  bp  

18:00 Reassessment: PT DECLINING OFFERED NORCO, STATES HE REQUIRES DILAUDID FOR PAIN          bp  

      MEDICATION.                                                                                 

18:30 Reassessment: PT NOW STATING SUPRAPUBIC CATH IS ADHERED AND CANNOT BE REMOVED, STATES   bp  

      UNABLE TO TOLERATE MANIPULATION.                                                            

                                                                                                  

Vital Signs:                                                                                      

16:46  / 77; Pulse 104; Resp 16; Temp 98.2; Pulse Ox 97% on R/A; Weight 127.46 kg;      vg1 

      Height 4 ft. 11 in. (149.86 cm); Pain 8/10;                                                 

19:00  / 81; Pulse 91; Resp 18 S; Pulse Ox 96% on R/A;                                  as6 

21:00  / 67; Pulse 88; Resp 16 S; Pulse Ox 99% on R/A;                                  as6 

16:46 Body Mass Index 56.75 (127.46 kg, 149.86 cm)                                            vg1 

                                                                                                  

ED Course:                                                                                        

16:01 Patient arrived in ED.                                                                  mr  

16:01 Domingo Mc MD is Private Physician.                                                 mr  

16:06 Siddharth Gonsalez DO is Attending Physician.                                                ms3 

16:49 Triage completed.                                                                       vg1 

16:49 Arm band placed on.                                                                     vg1 

17:00 Patient has correct armband on for positive identification. Call light in reach.        bp  

17:57 Huan Martinez, RN is Primary Nurse.                                                    bp  

19:04 Attending Physician role handed off by Siddharth Gonsalez DO                                 rn  

19:04 Anuel Berry MD is Attending Physician.                                                rn  

19:09 Primary Nurse role handed off by Huan Martinez, YADIEL                                     tw5 

19:09 Graciela Winter is Primary Nurse.                                                         tw5 

20:28 Ortega cath inserted, using sterile technique, 16 Fr., by me, balloon inflated, to       aa9 

      gravity drainage, other suprapubic.                                                         

20:53 Domingo Mc MD is Referral Physician.                                                rn  

22:58 No provider procedures requiring assistance completed. Patient did not have IV access   as6 

      during this emergency room visit.                                                           

                                                                                                  

Administered Medications:                                                                         

No medications were administered                                                                  

                                                                                                  

                                                                                                  

Medication:                                                                                       

22:58 VIS not applicable for this client.                                                     as6 

                                                                                                  

Outcome:                                                                                          

20:54 Discharge ordered by MD.                                                                rn  

22:58 Discharged to home via wheelchair.                                                      as6 

22:58 Condition: stable                                                                           

22:58 Discharge instructions given to patient, Instructed on discharge instructions, follow       

      up and referral plans. Demonstrated understanding of instructions, follow-up care.          

23:01 Patient left the ED.                                                                    as6 

                                                                                                  

Signatures:                                                                                       

Aron Roya antunez                                                   

Anuel Berry MD MD rn Peltier, Brian, RN                      RN   Rosa M Ruvalcaba, RN                    RN   vg1                                                  

Siddharth Gonsalez DO DO   ms3                                                  

Graciela Winter                                tw5                                                  

Zane West RN RN   as6                                                  

Aye Dsouza, YADIEL                       RN   aa9                                                  

                                                                                                  

**************************************************************************************************

## 2022-10-02 ENCOUNTER — HOSPITAL ENCOUNTER (EMERGENCY)
Dept: HOSPITAL 97 - ER | Age: 37
Discharge: HOME | End: 2022-10-02
Payer: COMMERCIAL

## 2022-10-02 VITALS — OXYGEN SATURATION: 96 % | DIASTOLIC BLOOD PRESSURE: 78 MMHG | TEMPERATURE: 98.5 F | SYSTOLIC BLOOD PRESSURE: 119 MMHG

## 2022-10-02 DIAGNOSIS — J45.909: ICD-10-CM

## 2022-10-02 DIAGNOSIS — K21.9: ICD-10-CM

## 2022-10-02 DIAGNOSIS — N39.0: Primary | ICD-10-CM

## 2022-10-02 DIAGNOSIS — L89.899: ICD-10-CM

## 2022-10-02 DIAGNOSIS — R51.9: ICD-10-CM

## 2022-10-02 DIAGNOSIS — G80.9: ICD-10-CM

## 2022-10-02 DIAGNOSIS — Q05.4: ICD-10-CM

## 2022-10-02 DIAGNOSIS — Z88.0: ICD-10-CM

## 2022-10-02 DIAGNOSIS — Z88.5: ICD-10-CM

## 2022-10-02 DIAGNOSIS — Z98.2: ICD-10-CM

## 2022-10-02 DIAGNOSIS — G82.20: ICD-10-CM

## 2022-10-02 PROCEDURE — 71250 CT THORAX DX C-: CPT

## 2022-10-02 PROCEDURE — 87088 URINE BACTERIA CULTURE: CPT

## 2022-10-02 PROCEDURE — 81015 MICROSCOPIC EXAM OF URINE: CPT

## 2022-10-02 PROCEDURE — 96372 THER/PROPH/DIAG INJ SC/IM: CPT

## 2022-10-02 PROCEDURE — 70450 CT HEAD/BRAIN W/O DYE: CPT

## 2022-10-02 PROCEDURE — 72125 CT NECK SPINE W/O DYE: CPT

## 2022-10-02 PROCEDURE — 99283 EMERGENCY DEPT VISIT LOW MDM: CPT

## 2022-10-02 PROCEDURE — 87086 URINE CULTURE/COLONY COUNT: CPT

## 2022-10-02 NOTE — XMS REPORT
Continuity of Care Document

                           Created on:2022



Patient:LAMBERT BRYANT REDD MADRIGAL

Sex:Male

:1985

External Reference #:[id number]





Demographics







                          Address                   [address]



                                                    [place] [address]

 

                          Home Phone                [phone]

 

                          Work Phone                [phone]

 

                          Mobile Phone              [phone]

 

                          Email Address             [address]

 

                          Preferred Language        English

 

                          Marital Status            Unknown

 

                          Mosque Affiliation     Unknown

 

                          Race                      Unknown

 

                          Additional Race(s)        Unavailable



                                                    Black or 

 

                          Ethnic Group              Unknown









Author







                          Organization              [facility]

t

 

                          Address                   [address]



                                                    [place] [address]

 

                          Phone                     [phone]









Support







                Name            Relationship    Address         Phone

 

                SABRINA VERDIN  Mother          [address]           [phone]



                                                [address] [id number] 

 

                Redd Verdin  Unavailable     [address] [phone]



                                                [place] [address] 

 

                Cecelia Verdin Unavailable     Unavailable     [phone]

 

                Redd Verdin Jr Unavailable     [address]

 [phone]



                                                [place] [address] 

 

                Sabrina Verdin  Mother          [address] [phone]



                                                [place] [address] 

 

                one else per patient, No Unavailable     Unavailable     Unavail

able

 

                Sabrina Verdin  Mother          [address] [phone]

[phone]



                                                [place] [address] 

 

                PATIENT, NO ONE ELSE PER Unavailable     Unavailable     Unavail

able

 

                PHYILLIS        Grandparent     Unavailable     Unavailable

 

                SABRINA VERDIN ELINOR M               [address]    Unavailabl

e



                                                [place] [id number] 

 

                Sabrina Bustillo Mother          [address]    [phone]

[phone]



                                                [place] [address] 

 

                YAZ, SMITA  Grandparent     Unavailable     [phone]

 

                REDD VERDIN  F               [address]    Unavailable



                                                [place] [address] 

 

                O'GREG, SMITA LAURA Grandparent     [address]       Unavailable



                                                [place] [address] 

 

                Laura O'Greg, Smita Grandparent     [address]       [phone]



                                                [place] [address] 

 

                Lambert Sr., Redd Father          [address]    [phone]

[phone]



                                                [place] [address] 









Care Team Providers







                    Name                Role                Phone

 

                    Sharpneil           Primary Care Physician [phone]

 

                    Domingo Mc       Attending Clinician Unavailable

 

                    MARYA NNE RAMIREZ             Attending Clinician Unavailable

 

                    MARY ANNE RAMIREZ             Attending Clinician Unavailable

 

                    OGUNCYNTHIA, SAPPHIRE A Attending Clinician Unavailable

 

                    Scooter RN, Yessica NELSON Attending Clinician Unavailable

 

                    Yoselyn MEADOWS, Jessi  Attending Clinician [phone]

 

                    Ronel COTTO, Stephany ARAUJO Attending Clinician [phone]

 

                    SCOOBY SHARPE   Attending Clinician Unavailable

 

                    Thanh FNP, Yo B Attending Clinician [phone]

 

                    Jojo MEADOWS, Keenan Olmedo Attending Clinician [phone]

[phone]

 

                    rTish MEADOWS, An OH   Attending Clinician [phone]

 

                    Alan MEADOWS, Liz NAM  Attending Clinician [phone]

 

                    Scooby Sharpe MD Attending Clinician [phone]

 

                    Sheridan Delarosa MD Attending Clinician [phone]

 

                    Shahid MEADOWS, Mac M   Attending Clinician [phone]

 

                    Vamshi ALEGRE, Sapphire A Attending Clinician [phone]

 

                    Dulce Guzman LVN Attending Clinician [phone]

 

                    KAYLYNN LU      Attending Clinician Unavailable

 

                    Ashly Greene S  Attending Clinician [phone]

 

                    Rachel Bailey MD   Attending Clinician [phone]

 

                    Kaylynn Lu MD   Attending Clinician [phone]

 

                    RADHA MEADOWS Attending Clinician Unavailable

 

                    CHRIS SOL     Attending Clinician Unavailable

 

                    Alondra Santos MD Attending Clinician [phone]

 

                    Chris Sol DO  Attending Clinician [phone]

 

                    RADHA FOFANA     Attending Clinician Unavailable

 

                    Radha Fofana MD  Attending Clinician [phone]

 

                    TREY MEADOWS    Attending Clinician Unavailable

 

                    Noe Phillips MD     Attending Clinician [phone]

 

                    Trey Meadows MD Attending Clinician [phone]

 

                    EDWARDO LOPEZ       Attending Clinician Unavailable

 

                    Josse Alonso Attending Clinician [phone]

 

                    Edwardo Lopez DO    Attending Clinician [phone]

 

                    RACHEL BAILEY      Attending Clinician Unavailable

 

                    Shelton Pradhan Attending Clinician [phone]

 

                    DEAN SEGOVIA    Attending Clinician Unavailable

 

                    Tobias Hernandez MD  Attending Clinician [phone]

 

                    Efrain Myles MD Attending Clinician [phone]

 

                    Andre MEADOWS, Martha SMITH  Attending Clinician [phone]

 

                    Dean Segovia MD Attending Clinician Unavailable

 

                    RAND DONG      Attending Clinician Unavailable

 

                    Rand Dong MD   Attending Clinician [phone]

 

                    Trey Fairchild Attending Clinician [phone]

 

                    TREY FAIRCHILD Attending Clinician Unavailable

 

                    Bernardo De Souza Attending Clinician [phone]

 

                    ALLISON ROMEO     Attending Clinician Unavailable

 

                    ALLISON Carver Attending Clinician [phone]

 

                    ASHLEY GARAY    Attending Clinician Unavailable

 

                    Ashley Garay MD Attending Clinician [phone]

 

                    ALMA VILLASEÑOR    Attending Clinician Unavailable

 

                    Alma Woodard Attending Clinician [phone]

 

                    NEETA MAIRA    Attending Clinician Unavailable

 

                    Neeta Maria NP Attending Clinician [phone]

 

                    Only, Ang Db Test   Attending Clinician Unavailable

 

                    Nicole Llanes MD     Attending Clinician [phone]

 

                    NICOLE LLANES        Attending Clinician Unavailable

 

                    Acacia Contreras Attending Clinician [phone]

 

                    ACACIA VILLA Attending Clinician Unavailable

 

                    MARTY RIVERS    Attending Clinician Unavailable

 

                    Deisy Linares   Attending Clinician [phone]

 

                    Deedee Reinoso   Attending Clinician [phone]

 

                    RAMU TORRES Attending Clinician Unavailable

 

                    AN CHAMBERS      Admitting Clinician Unavailable

 

                    An Chambers MD   Admitting Clinician [phone]

 

                    KAYLYNN LU      Admitting Clinician Unavailable

 

                    Tabitha MEADOWS, Kaylynn   Admitting Clinician [phone]

 

                    CHRIS SOL     Admitting Clinician Unavailable

 

                    TREY MEADOWS    Admitting Clinician Unavailable

 

                    Abdiel MEADOWS, Trey Admitting Clinician [phone]

 

                    EDWARDO LOPEZ       Admitting Clinician Unavailable

 

                    Edwardo Lopez DO    Admitting Clinician [phone]

 

                    RACHEL BAILEY      Admitting Clinician Unavailable

 

                    Rachel Bailey MD   Admitting Clinician [phone]

 

                    TOBIAS HERNANDEZ     Admitting Clinician Unavailable

 

                    Tobias Hernandez MD  Admitting Clinician [phone]

 

                    RAND DONG      Admitting Clinician Unavailable

 

                    Rand Dong MD   Admitting Clinician [phone]

 

                    Deisy Sutton Admitting Clinician [phone]

 

                    DEISY SUTTON Admitting Clinician Unavailable

 

                    Bernardo De Souza Admitting Clinician [phone]

 

                    ALLISON ROMEO     Admitting Clinician Unavailable

 

                    ALMA [place]ÑOR    Admitting Clinician Unavailable

 

                    NEETA MARIA    Admitting Clinician Unavailable

 

                    NURIA PEREIRA        Admitting Clinician Unavailable

 

                    Peter De Leon Admitting Clinician [phone]

 

                    Deedee Reinoso   Admitting Clinician [phone]

 

                    RAMU TORRES Admitting Clinician Unavailable









Payers







           Payer Name Policy Type Policy Number Effective Date Expiration Date S

cameron

 

           [employer]            [id number]  2021            



           PLUS                             00:00:00              

 

           [id number]  2021            



                                            00:00:00              







Problems







       Condition Condition Condition Status Onset  Resolution Last   Treating Co

mments 

Source



       Name   Details Category        Date   Date   Treatment Clinician        



                                                 Date                 

 

       Chest pain Chest pain Disease Active                              FAMILIA govea



                                                                  ity of



                                   00:00:                             [place]



                                   00                                 Medical



                                                                      Branch

 

       Foot ulcer Foot ulcer Disease Active                       Overview

: Univers



       with fat with fat                                       Formattin ity

 of



       layer  layer                00:00:                      g of this [place]



       exposed, exposed,               00                          note   Medica

l



       left   left                                             might be Branch



                                                               different 



                                                               from the 



                                                               original. 



                                                               Added  



                                                               automatic 



                                                               ally from 



                                                               request 



                                                               for    



                                                               surgery 



                                                               [id number] 

 

       Right foot Right foot Disease Active                       Overview

: Univers



       ulcer, ulcer,               803                        Formattin ity of



       with fat with fat               00:00:                      g of this Eric

as



       layer  layer                00                          note   Medical



       exposed exposed                                           might be Branch



                                                               different 



                                                               from the 



                                                               original. 



                                                               Added  



                                                               automatic 



                                                               ally from 



                                                               request 



                                                               for    



                                                               surgery 



                                                               [id number] 

 

       Diabetic Diabetic Disease Active                              Unive

rs



       ketoacidos ketoacidos                                              it

y of



       is without is without               00:00:                             [place]

[place]



       coma   coma                 00                                 Medical



       associated associated                                                  Br

anch



       with type with type                                                  



       1 diabetes 1 diabetes                                                  



       mellitus mellitus                                                  

 

       Abdominal Abdominal Disease Active                              Uni

vers



       pain,  pain,                6-24                               ity of



       unspecifie unspecifie               00:00:                             Te

[place]



       d      d                    00                                 Medical



       abdominal abdominal                                                  Bran

ch



       location location                                                  

 

       Hyperglyce Hyperglyce Disease Active                              U

jeferson



       jarvis    jarvis                                                 ity of



                                   00:00:                             [place]



                                   00                                 Medical



                                                                      Branch

 

       Diabetic Diabetic Disease Active                              Unive

rs



       ketoacidos ketoacidos               6-05                               it

y of



       is without is without               00:00:                             Te

[place]



       coma   coma                 00                                 Medical



       associated associated                                                  Br

anch



       with type with type                                                  



       2 diabetes 2 diabetes                                                  



       mellitus mellitus                                                  

 

       Decubitus Decubitus Disease Active                              Uni

vers



       ulcer of ulcer of               05                               ity of



       heel,  heel,                00:00:                             [place]



       left,  left,                00                                 Medical



       unstageabl unstageabl                                                  Br

anch



       e      e                                                       

 

       Pyuria Pyuria Disease Active                              Univers



                                   605                               ity of



                                   00:00:                             [place]



                                   00                                 Medical



                                                                      Branch

 

       Chronic Chronic Disease Active                              Univers



       suprapubic suprapubic               6                               it

y of



       catheter catheter               00:00:                             [place]



                                   00                                 Medical



                                                                      Branch

 

       Uncontroll Uncontroll Disease Active                              U

jeferson



       ed     ed                   6                               ity of



       diabetes diabetes               00:00:                             [place]



       mellitus mellitus               00                                 Medica

l



       with   with                                                    Branch



       complicati complicati                                                  



       ons    ons                                                     

 

       Spina  Spina  Disease Recurre                              Univers



       bifida bifida        nce    6                               ity of



                                   00:00:                             [place]



                                   00                                 Medical



                                                                      Branch

 

       Wound  Wound  Disease Active                              Univers



       infection infection               -                               ity 

of



                                   00:00:                             [place]



                                   00                                 Medical



                                                                      Branch

 

       Chills Chills Disease Active                              Univers



                                   5-11                               ity of



                                   00:00:                             [place]



                                   00                                 Medical



                                                                      Branch

 

       POSSIBLE  POSSIBLE Diagnosis Active 2022             

  [facility]



        SHUNT  SHUNT                         21:48:00               l



       MALFUNCTIO MALFUNCTIO               00:00:                             He

rmann



       N      N Active               00                                 



              2022                                                  



               [facility]                                                  

 

        SHUNT   SHUNT Diagnosis Active 2022             

  [facility]



       MALFUNCTIO MALFUNCTIO               3-          14:12:00               

l



       N      N Active               00:00:                             Jose



              2022               00                                 



               [place] [facility]                                                  

 

        SHUNT   SHUNT Diagnosis Active 2022             

  [facility]



       MALFUCTION MALFUCTION               3-24          23:59:00               

l



              Active               00:00:                             Jose



              2022               00                                 



              [place] [facility]                                                  

 

       Complicate Complicate Disease Active                              U

nivers



       d urinary d urinary               1-20                               ity 

of



       tract  tract                00:00:                             [place]



       infection infection               00                                 Medi

sarah



                                                                      Branch

 

       Hyperglyce Hyperglyce Disease Active                              C

[facility]



       jarvis    jarvis                                                 [facility]



       without without               00:00:                             Medical



       ketosis ketosis               00                                 [place]

 

       HEADACHE   HEADACHE Diagnosis Active 2018            

   [facility]



              Active                         22:05:00               l



              2018               00:00:                             Miguel malloy



              [address]                                                  

 

       SHUNT   SHUNT Diagnosis Active 2018               Mem

oria



       MALFUNCTIO MALFUNCTIO                         20:00:00               

l



       N      N Active               00:00:                             Jose



              2018               00                                 



              [place] [facility]                                                  

 

       ACUTE   ACUTE Diagnosis Active 2018               Mem

oria



       HEADACHE HEADACHE               -          09:20:00               l



              Active               00:00:                             Jose



              2018               00                                 



               [place] [facility]                                                  

 

       Spina  Spina  Disease Active                              [facility]



       bifida bifida               -                               [facility]



                                   00:00:                             Medical



                                   00                                 [place]

 

       Pyelonephr Pyelonephr Disease Active                              C

[facility]



       itis   itis                 6-11                               [facility]



                                   00:00:                             Medical



                                   [address] [place] [address]

 

       Morbid Morbid Disease Active                              Univers



       obesity obesity               1-04                               ity of



       with body with body               00:00:                             Mariana

s



       mass index mass index               00                                 Me

dical



       of     of                                                      Branch



       40.0-49.9 40.0-49.9                                                  

 

       Asthma  Asthma Problem Resolve               2022-04-15               Mem

oria



       (disorder) (disorder)        d                    23:48:47               

l



              Resolved                                                  [place]



              Problem                                                  



              04/15/2022                                                  



              Eber                                                  



              Neuro,[place] [facility]                                                  

 

       Bronchitis  Bronchiti Problem Resolve               2022-04-15           

    [facility]



       (disorder) s             d                    23:48:47               l



              (disorder)                                                  Miguel

n



              Resolved                                                  



              Problem                                                  



              04/15/2022                                                  



              [facility]                                                  

 

       Cerebral  Cerebral Problem Resolve               2022-04-15              

 [facility]



       palsy  palsy         d                    23:48:47               l



       (disorder) (disorder)                                                  He

rmann



              Resolved                                                  



              Problem                                                  



              04/15/2022                                                  



              [facility]                                                  

 

       Hydrocepha  Hydroceph Problem Resolve               2022-04-15           

    [facility]



       ozzy    alus          d                    23:48:47               l



       (disorder) (disorder)                                                  He

rmann



              Resolved                                                  



              Problem                                                  



              04/15/2022                                                  



              [facility]                                                  

 

       Osteomyeli  Osteomyel Problem Resolve               2022-04-15           

    [facility]



       tis    itis          d                    23:48:47               l



       (disorder) (disorder)                                                  He

rmann



              Resolved                                                  



              Problem                                                  



              04/15/2022                                                  



               [facility]                                                  

 

       Acute pain   Acute Problem Active               2022-04-15               

[facility]



       (finding) pain                               23:48:47               l



              (finding)                                                  Jose



              Active                                                  



              Problem                                                  



              04/15/2022                                                  



              [facility]                                                  

 

       Headache  Headache Problem Active               2022-04-15               

[facility]



       (finding) (finding)                             23:48:47               l



              Active                                                  Jose



              Problem                                                  



              04/15/2022                                                  



              [facility]                                                  

 

       Morbid  Morbid Problem Active               2022-04-15               Chace

rajeev



       obesity obesity                             23:48:47               l



       (disorder) (disorder)                                                  He

rmann



              Active                                                  



              Problem                                                  



              04/15/2022                                                  



              [facility]                                                  

 

       Providenci  Providenc Problem Active               2022-04-15            

   [facility]



       a      ia                                 23:48:47               l



       (organism) (organism)                                                  He

ann



              Active                                                  



              Problem                                                  



              04/15/2022                                                  



               Problem                                                  



              added by                                                  



              Discern                                                  



              Expert. [facility]                                                  

 

       Spina   Spina Problem                      2019               Memor

ia



       bifida, bifida,                             14:14:02               l



       unspecifie unspecifie                                                  He

rmann



       d      d                                                       



              2019                                                  



               [facility]                                                  

 

       Nausea  Nausea Problem                      2019               Chace

rajeev



       with   with                               14:14:02               l



       vomiting, vomiting,                                                  Herm

jorge



       unspecifie unspecifie                                                  



       d      d                                                       



              2019                                                  



              [facility]                                                  

 

       Diplopia  Diplopia Problem                      2019               

[facility]



              2019                             14:14:02               l



               [facility]                                                  

 

       Cerebral  Cerebral Problem                      2019               

[facility]



       palsy, palsy,                             14:14:02               l



       unspecifie unspecifie                                                  Amaya

rmann



       d      d                                                       



              2019                                                  



              [facility]                                                  

 

       Acquired  Acquired Problem                      2019               

[facility]



       absence of absence of                             14:14:02               

l



       other  other                                                   [place]



       specified specified                                                  



       parts of parts of                                                  



       digestive digestive                                                  



       tract  tract                                                   



              2019                                                  



              [facility]                                                  

 

       Nicotine  Nicotine Problem                      2019               

[facility]



       dependence dependence                             14:14:02               

l



       ,      ,                                                       [place]



       cigarettes cigarettes                                                  



       ,      ,                                                       



       uncomplica uncomplica                                                  



       huma    huma                                                     



              2019                                                  



              [facility]                                                  

 

       Presence  Presence Problem                      2019               

[facility]



       of     of                                 14:14:02               l



       cerebrospi cerebrospi                                                  Amaya

rmann



       nal fluid nal fluid                                                  



       drainage drainage                                                  



       device device                                                  



              2019                                                  



              [facility]                                                  

 

       Allergy  Allergy Problem                      2019               Me

moria



       status to status to                             14:14:02               l



       other  other                                                   [place]



       antibiotic antibiotic                                                  



       agents agents                                                  



       status status                                                  



              2019                                                  



              [facility]                                                  

 

       Allergy  Allergy Problem                      2019               Me

moria



       status to status to                             14:14:02               l



       other  other                                                   Jose



       drugs, drugs,                                                  



       medicament medicament                                                  



       s and  s and                                                   



       biological biological                                                  



       substances substances                                                  



       status status                                                  



              2019                                                  



              [facility]                                                  

 

       Allergy  Allergy Problem                      2019               Me

moria



       status to status to                             14:14:02               l



       narcotic narcotic                                                  Miguel

n



       agent  agent                                                   



       status status                                                  



              2019                                                  



              [facility]                                                  







History of Past Illness







       Condition Condition Condition Status Onset  Resolution Last   Treating Co

mments 

Source



       Name   Details Category        Date   Date   Treatment Clinician        



                                                 Date                 

 

       Headache   Headache Problem        2019          

     [facility]



              2018-   14:14:02 14:14:02               l



              2019               06:00:                             Miguel malloy



              [address]                                                  







Allergies, Adverse Reactions, Alerts







       Allergy Allergy Status Severity Reaction(s) Onset  Inactive Treating Comm

ents 

Source



       Name   Type                        Date   Date   Clinician        

 

       Amoxicil Propensi Active        Hives                        Un[facility]

s



       bryn    ty to                                               ity of



              adverse                      00:00:                      [place]



              reaction                      00                          Medical



              s                                                       Branch

 

       AMOXICIL DRUG   Active        Hives                        [facility]



       BRYN    INGREDI                                              ity of



                                          00:00:                      [place]



                                          00                          Medical



                                                                      Branch

 

       Metoclop Propensi Active        Nausea 2017                      Un[facility]

s



       ramide ty to                and/or 2-20                        ity of



       Hcl    adverse               Vomiting 00:00:                      [place]



              reaction                      00                          Medical



              s                                                       Branch

 

       METOCLOP DRUG   Active        N/V    2017                      [facility]



       RAMIDE INGREDI                      2-20                        ity of



       HCL                                00:00:                      [place]



                                          00                          Medical



                                                                      Branch

 

       SULFAMET Allergy Active High   Hives  2017-                      [facility]



       HOXAZOLE                             6-11                        [facility]



       -TRIMETH                             00:00:                      Medical



       OPRIM                              00                          Center

 

       LEVOFLOX Allergy Active High   Hives  2017-                      [facility]



       ACIN                               6-11                        [facility]



                                          00:00:                      [facility]



                                          00                          Center

 

       MORPHINE Allergy Active High   Hives  2017-                      [facility]



                                          6-11                        [facility]



                                          00:00:                      [facility]



                                          00                          Center

 

       KETOROLA Allergy Active High   Rash   2017-                      [facility]



       C                                  6-11                        [facility]



                                          00:00:                      Medical



                                          00                          Center

 

       VANCOMYC Allergy Active High   Rash   2017-                      [facility]



       IN                                 6-11                        [facility]



       ANALOGUE                             00:00:                      Medical



       S                                  00                          Center

 

       SESAME Allergy Active        Hives                        [facility]



       SEED                               6-11                        [facility]



                                          00:00:                      [facility]



                                          00                          Center

 

       ONDANSET Allergy Active        N\T\V  -                      [facility]



       EVIN HCL                             6-11                        [facility]



       (PF)                               00:00:                      Medical



                                          00                          Center

 

       Sulfamet Propensi Active        Hives  -                      [facility]



       hoxazole ty to                       6-11                        [facility]



       -Trimeth adverse                      00:00:                      Medical



       oprim  reaction                      00                          Center



              s                                                       

 

       Levoflox Propensi Active        Hives  -                      [facility]



       acin   ty to                       6-11                        [facility]



              adverse                      00:00:                      Medical



              reaction                      00                          Center



              s                                                       

 

       Morphine Propensi Active        Hives  -                      [facility]



              ty to                       6-11                        [facility]



              adverse                      00:00:                      Medical



              reaction                      00                          Center



              s                                                       

 

       Sesame Propensi Active        Hives  -                      [facility]



       Seed   ty to                       6-11                        [facility]



              adverse                      00:00:                      Medical



              reaction                      00                          Center



              s                                                       

 

       Ketorola Propensi Active        Rash   2017-                      [facility]



       c      ty to                       6-11                        [facility]



              adverse                      00:00:                      Medical



              reaction                      00                          Center



              s                                                       

 

       Vancomyc Propensi Active        Rash   2017-                      [facility]



       in     ty to                       6-11                        [facility]



       Analogue adverse                      00:00:                      Medical



       s      reaction                      00                          Center



              s                                                       

 

       Ondanset Propensi Active        Nausea And 2017-0                      [facility]



       evin Hcl ty to                Vomiting 6-11                        [facility]



       (Pf)   adverse                      00:00:                      Medical



              reaction                      00                          Center



              s                                                       

 

       Sulfa  Propensi Active        Other - See                       Uni

vers



       (Sulfona ty to                comments 1-04                        ity of



       mide   adverse                      00:00:                      [place]



       Antibiot reaction                      00                          Medica

l



       ics)   s                                                       Branch

 

       Sulfamet Propensi Active        Other - See 2017                      U

nivers



       hoprim ty to                comments -                        ity of



       Ds     adverse                      00:00:                      [place]



              reaction                      00                          Medical



              s                                                       Branch

 

       Vancomyc Propensi Active        Other - See                       U

nivers



       in     ty to                comments                         ity of



              adverse                      00:00:                      [place]



              reaction                      00                          Medical



              s                                                       Branch

 

       Sulfa  Propensi Active        Other - See                       Uni

vers



       (Sulfona ty to                comments                         ity of



       mide   adverse                      00:00:                      [place]



       Antibiot reaction                      00                          Medica

l



       ics)   s                                                       Branch

 

       SULFA  Drug   Active        Other-Cmnt                       Univer

s



       (SULFONA Class                       1-04                        ity of



       MIDE                               00:00:                      [place]



       ANTIBIOT                             00                          Medical



       ICS)                                                           Branch

 

       SULFAMET DRUG   Active        Other-Cmnt                       Univ

ers



       HOPRIM                                                     ity of



       DS                                 00:00:                      [place]



                                          00                          Medical



                                                                      Branch

 

       VANCOMYC DRUG   Active        Other-Cmnt                       Univ

ers



       IN     INGREDI                                              ity of



                                          00:00:                      [place]



                                          00                          Medical



                                                                      Branch

 

       Sulfamet Propensi Active        Rash   0                      Univer

s



       hoxazole ty to                       7-19                        ity of



              adverse                      00:00:                      [place]



              reaction                      00                          Medical



              s                                                       Branch

 

       Ondanset Propensi Active        Nausea 0                      Univer

s



       evin Hcl ty to                and/or 719                        ity of



       (Pf)   adverse               Vomiting 00:00:                      [place]



              reaction                      00                          Medical



              s                                                       Branch

 

       SULFAMET DRUG   Active        Rash   0                      [facility]



       HOXAZOLE INGREDI                      7-19                        ity of



                                          00:00:                      [place]



                                          00                          Medical



                                                                      Branch

 

       ONDANSET DRUG   Active        N/V    0                      [facility]



       EVIN HCL                             7-19                        ity of



       (PF)                               00:00:                      [place]



                                          00                          Medical



                                                                      Branch

 

       Levoflox Propensi Active        Hives  0                      Univer

s



       acin   ty to                       5-23                        ity of



              adverse                      00:00:                      [place]



              reaction                      00                          Medical



              s                                                       Branch

 

       Morphine Propensi Active        Hives  0                      Univer

s



              ty to                       5-23                        ity of



              adverse                      00:00:                      [place]



              reaction                      00                          Medical



              s                                                       Branch

 

       Ketorola Propensi Active        Nausea 0                      Univer

s



       c      ty to                and/or 5-                        ity of



       Trometha adverse               Vomiting 00:00:                      [place]



       mine   reaction                      00                          Medical



              s                                                       Branch

 

       LEVOFLOX DRUG   Active        Hives                        [facility]



       ACIN   INGREDI                      5-                        ity of



                                          00:00:                      [place]



                                          00                          Medical



                                                                      Branch

 

       MORPHINE DRUG   Active        Hives  -0                      Univers



              INGREDI                      5-23                        ity of



                                          00:00:                      [place]



                                          00                          Medical



                                                                      Branch

 

       KETOROLA DRUG   Active        N/V    -0                      Univers



       C      INGREDI                                              ity of



       TROMETHA                             00:00:                      [place]



       [facility]                               00                          Medical



                                                                      Branch

 

       amoxicil amoxicil Active                                           Memori

a



       bryn    bryn                                                     l



                                                                      [place]

 

       morphine morphine Active                                           Memori

a



                                                                      l



                                                                      [place]

 

       Toradol Toradol Active                                           [facility]



                                                                      l



                                                                      [place]

 

       Minocin Minocin Active                                           [facility]



                                                                      l



                                                                      [place]

 

       Zofran Zofran Active                                           [facility]



                                                                      l



                                                                      [place]

 

       Levaquin Levaquin Active                                           Memori

a



                                                                      l



                                                                      [place]

 

       Bactrim Bactrim Active                                           [facility]



                                                                      l



                                                                      [place]

 

       Reglan Reglan Active                                           [facility]



                                                                      l



                                                                      [place]

 

       Morphine Adverse Active        Info Not                             Commo

n



       Sulfate Reaction               Available                             Spir

it



       ER                                                             [facility]

 

       Levaquin Adverse Active        Info Not                             Commo

n



              Reaction               Available                             [facility]

 

       Zofran Adverse Active        Info Not                             Common



              Reaction               Available                             [facility]







Family History







           Family Member Diagnosis  Comments   Start Date Stop Date  Source

 

           Natural father Diabetes                                    [facility]

 

           Natural mother No Significant                                  Univer

sity of [facility]                                  Medical 

[place]







Social History







           Social Habit Start Date Stop Date  Quantity   Comments   Source

 

           History [facility] o

f



           Alcohol Frequency                                             [facility]

edical



                                                                  Branch

 

           History SDOH                                             University o

f



           Alcohol Std Drinks                                             [facility]

 

           History [facility] o

f



           Alcohol Binge                                             [place] [facility]

al



                                                                  Branch

 

           History of tobacco                       Cigarette Smoker            

University of



           use                                                    [facility]

 

           Alcohol intake 2022-08-10 2022-08-10 Current drinker            Unive

rsity of



                      00:00:00   00:00:00   of alcohol            [facility]



                                            (finding)             Branch

 

           Alcohol Comment 2022 once a year            Universi

ty of



                      00:00:00   00:00:00                         [facility]

 

           Exposure to 2022 Not sure              [facility]



           SARS-CoV-2 (event) 00:00:00   23:25:00                         [facility]

 

           Tobacco use and 2022 Smokeless             Universit

y of



           exposure   00:00:00   00:00:00   tobacco non-user            [place] [facility]

dical



                                                                  [place]

 

           Social History 2022                       [facility]



                      05:57:00   05:57:00                         

 

           Cigarettes smoked 2017                       [facility]



           current (pack per 00:00:00   00:00:00                         Medical

 Center



           day) - Reported                                             

 

           Sex Assigned At 1985                       FRANCINE Walker



           B[facility]      00:00:00   [facility]:00                         [facility]









                Smoking Status  Start Date      Stop Date       Source

 

                Social History  2018 11:26:10                 [facility]

[facility]







Medications







       Ordered Filled Start  Stop   Current Ordering Indication Dosage Frequency

 Signature

                    Comments            Components          Source



     Medication Medication Date Date Medication? Clinician                (SIG) 

          



     Name Name                                                   

 

     insulin            Yes            26U       26 Units,           Unive

rs



     glargine      14                               Subcutaneo           ity o

f



     (LANTUS      02:00:                               us, [facility],           [place]



     U-100)      00                                 First dose           Medical



     injection                                         (after           Branch



     26 Units                                         last           



                                                  modificati           



                                                  on) on Sat           



                                                  22 at           



                                                  2100,           



                                                  Until           



                                                  Discontinu           



                                                  ed,            



                                                  Routine           

 

     insulin      2022- No             24U       24 Units,           Univ

ers



     glargine                                Subcutaneo           ity 

of



     (LANTUS      20:38: 20:43                          us, ONCE,           [place]

s



     U-100)      00   :00                           1 dose, On           Medical



     injection                                         Fri            Branch



     24 Units                                         22 at           



                                                  1545, ASAP           

 

     sennosides-            Yes            1{tbl}      1 tablet,          

 Univers



     docusate                                     Oral,           ity of



     sodium      14:00:                               DAILY,           [place]



     (SENOKOT-S)      00                                 First dose           Me

dical



     8.6-50 mg                                         on Fri           Branch



     per tablet                                         22 at           



     1 tablet                                         0900,           



                                                  Until           



                                                  Discontinu           



                                                  ed,            



                                                  Routine           

 

     polyethylen            Yes            17g       17 g,           Unive

rs



     e glycol                                     Oral,           ity of



     3350 powder      14:00:                               DAILY,           [place]

s



     17 g      00                                 First dose           Medical



                                                  on Fri           Branch



                                                  22 at           



                                                  0900,           



                                                  Until           



                                                  Discontinu           



                                                  ed,            



                                                  Routine           

 

     insulin NPH      2022- No             16U       16 Units,           

Univers



     (HUMULIN N)                                Subcutaneo           i

ty of



     injection      14:00: 17:23                          us,            [place]



     16 Units      00   :36                           QAM+HS,           Medical



                                                  First dose           Branch



                                                  (after           



                                                  last           



                                                  modificati           



                                                  on) on Fri           



                                                  22 at           



                                                  0900,           



                                                  Until           



                                                  Discontinu           



                                                  ed,            



                                                  Routine           

 

     Sliding            Yes                      Subcutaneo           Univ

ers



     Scale                                     us, TID           ity of



     Insulin -      13:00:                               MEALS+HS,           Eric

as



     Lispro      00                                 First dose           Medical



     (HumaLOG) +                                         (after           Branch



     Fsbg                                         last           



     Testing                                         modificati           



                                                  on) on 22 at           



                                                  0800,           



                                                  Until           



                                                  Discontinu           



                                                  ed,            



                                                  Routine           

 

     insulin NPH      2022- No             8U        8 Units,           U

nivers



     (HUMULIN N)                                Subcutaneo           i

ty of



     injection 8      02:00: 12:34                          us, QHS,           T

exas



     Units      00   :51                           First dose           Medical



                                                  (after           Branch



                                                  last           



                                                  modificati           



                                                  on) on Thu           



                                                  22 at           



                                                  2100,           



                                                  Until           



                                                  Discontinu           



                                                  ed,            



                                                  Routine           

 

     repaglinide            Yes       [id number] .5mg      Take 1          

 Univers



     0.5 mg      8-12                               tablet by           ity of



     tablet      00:00:                               mouth in           [address]



                                                  morning           [place]



                                                  and 1           



                                                  tablet at           



                                                  noon and 1           



                                                  tablet in           



                                                  the            



                                                  evening.           



                                                  Take           



                                                  before           



                                                  meals.           

 

     sodium      -0      Yes       [id number]           Apply to           Un

[place]



     hypochlorit      8-12                               area(s)           ity o

f



     e 0.25%      00:00:                               daily.           [place]



     solution                                                      [facility]

 

     repaglinide            Yes       [id number] .5mg      Take 1          

 Univers



     0.5 mg      8-12                               tablet by           ity of



     tablet      00:00:                               mouth in           [address]



                                                  morning           [place]



                                                  and 1           



                                                  tablet at           



                                                  noon and 1           



                                                  tablet in           



                                                  the            



                                                  evening.           



                                                  Take           



                                                  before           



                                                  meals.           

 

     sodium      -0      Yes       [id number]           Apply to           Un

[place]



     hypochlorit      8-12                               area(s)           ity o

f



     e 0.25%      00:00:                               daily.           [place]



     solution                                                      Medical



                                                                 Branch

 

     repaglinide      -0      Yes       [id number] .5mg      Take 1          

 Univers



     0.5 mg      8-12                               tablet by           ity of



     tablet      00:00:                               mouth in           [address]



                                                  morning           [place]



                                                  and 1           



                                                  tablet at           



                                                  noon and 1           



                                                  tablet in           



                                                  the            



                                                  evening.           



                                                  Take           



                                                  before           



                                                  meals.           

 

     sodium      -0      Yes       [id number]           Apply to           Un

[place]



     hypochlorit      8-12                               area(s)           ity o

f



     e 0.25%      00:00:                               daily.           [place]



     solution                                                      [facility]

 

     repaglinide      -0      Yes       [id number] .5mg      Take 1          

 Univers



     0.5 mg      8-12                               tablet by           ity of



     tablet      00:00:                               mouth in           [place]



               00                                 the            Medical



                                                  morning           [place]



                                                  and 1           



                                                  tablet at           



                                                  noon and 1           



                                                  tablet in           



                                                  the            



                                                  evening.           



                                                  Take           



                                                  before           



                                                  meals.           

 

     sodium            Yes       [id number]           Apply to           Un

[place]



     hypochlorit                                     area(s)           ity o

f



     e 0.25%      00:00:                               daily.           [place]



     solution      00                                                [facility]

 

     apixaban 5      2022- Yes            5mg       Take 1           Univ

ers



     mg tablet                                tablet by           ity 

of



               00:00: 04:59                          mouth in           [place]



               00   :00                           the            [facility]



                                                  and 1           



                                                  tablet in           



                                                  the            



                                                  evening.           



                                                  Do all           



                                                  this for           



                                                  30 days.           



                                                  Indication           



                                                  s:             



                                                  Symtomatic           



                                                  Superficia           



                                                  l Vein           



                                                  thrombosis           

 

     insulin      2022- Yes       [id number] 24U       inject 24          

 Univers



     glargine                                Units           ity of



     100 unit/mL      00:00: 04:59                          under the           

[place]



     injection      00   :00                           skin at           [facility]



                                                  for 30           



                                                  days.           

 

     metFORMIN      2022- Yes       [id number] 500mg      Take 1          

 Univers



     500 mg                                tablet by           ity of



     tablet      00:00: 04:59                          mouth in           [place]



               00   :00                           [facility]



                                                  and 1           



                                                  tablet in           



                                                  the            



                                                  evening.           



                                                  Take with           



                                                  meals. Do           



                                                  all this           



                                                  for 30           



                                                  days.           

 

     dulaglutide      2022- Yes       [id number] .75mg      inject 1      

     Univers



     (TRULICITY)                                Pen under           it

y of



     0.75 mg/0.5      00:00: 04:59                          the skin           T

exas



     mL PnIj      00   :00                           weekly for           Medica

l



                                                  30 days.           Branch

 

     apixaban 5      2022- Yes            5mg       Take 1           Univ

ers



     mg tablet                                tablet by           ity 

of



               00:00: 04:59                          mouth in           [place]



               00   :00                           [facility]



                                                  and 1           



                                                  tablet in           



                                                  the            



                                                  evening.           



                                                  Do all           



                                                  this for           



                                                  30 days.           



                                                  Indication           



                                                  s:             



                                                  Symtomatic           



                                                  Superficia           



                                                  l Vein           



                                                  thrombosis           

 

     insulin      2022- Yes       [id number] 24U       inject 24          

 Univers



     glargine                                Units           ity of



     100 unit/mL      00:00: 04:59                          under the           

[place]



     injection      00   :00                           skin at           [facility]



                                                  for 30           



                                                  days.           

 

     metFORMIN      2022- Yes       [id number] 500mg      Take 1          

 Univers



     500 mg                                tablet by           ity of



     tablet      00:00: 04:59                          mouth in           [place]



               00   :00                           the            Medical



                                                  morning           Branch



                                                  and 1           



                                                  tablet in           



                                                  the            



                                                  evening.           



                                                  Take with           



                                                  meals. Do           



                                                  all this           



                                                  for 30           



                                                  days.           

 

     dulaglutide      2022- Yes       [id number] .75mg      inject 1      

     [facility]



     (TRULICITY)                                Pen under           it

y of



     0.75 mg/0.5      00:00: 04:59                          the skin           [facility]

[place]



     mL PnIj      00   :00                           weekly for           [facility]

l



                                                  30 days.           [facility]

 

     apixaban 5      2022- Yes            5mg       Take 1           [facility]

ers



     mg tablet                                tablet by           ity 

of



               00:00: 04:59                          mouth in           [place]



               00   :00                           the            [facility]



                                                  morning           [facility]



                                                  and 1           



                                                  tablet in           



                                                  the            



                                                  evening.           



                                                  Do all           



                                                  this for           



                                                  30 days.           



                                                  Indication           



                                                  s:             



                                                  Symtomatic           



                                                  Superficia           



                                                  l Vein           



                                                  thrombosis           

 

     insulin      2022- Yes       [id number] 24U       inject 24          

 [facility]



     glargine                                Units           ity of



     100 unit/mL      00:00: 04:59                          under the           

[place]



     injection      00   :00                           skin at           [facility]



                                                  bedtime           [facility]



                                                  for 30           



                                                  days.           

 

     metFORMIN      2022- Yes       [id number] 500mg      Take 1          

 [facility]



     500 mg                                tablet by           ity of



     tablet      00:00: 04:59                          mouth in           [place]



               00   :00                           the            [facility]



                                                  and 1           



                                                  tablet in           



                                                  the            



                                                  evening.           



                                                  Take with           



                                                  meals. Do           



                                                  all this           



                                                  for 30           



                                                  days.           

 

     dulaglutide      2022- Yes       [id number] .75mg      inject 1      

     [facility]



     (TRULICITY)                                Pen under           it

y of



     0.75 mg/0.5      00:00: 04:59                          the skin           [facility]

[place]



     mL PnIj      00   :00                           weekly for           [facility]

l



                                                  30 days.           [facility]

 

     apixaban 5      2022- Yes            5mg       Take 1           [facility]

ers



     mg tablet                                tablet by           ity 

of



               00:00: 04:59                          mouth in           [place]



               00   :00                           the            Medical



                                                  morning           [facility]



                                                  and 1           



                                                  tablet in           



                                                  the            



                                                  evening.           



                                                  Do all           



                                                  this for           



                                                  30 days.           



                                                  Indication           



                                                  s:             



                                                  Symtomatic           



                                                  Superficia           



                                                  l Vein           



                                                  thrombosis           

 

     insulin      2022- Yes       [id number] 24U       inject 24          

 [facility]



     glargine                                Units           ity of



     100 unit/mL      00:00: 04:59                          under the           

[place]



     injection      00   :00                           skin [facility]



                                                  bedtime           [facility]



                                                  for 30           



                                                  days.           

 

     metFORMIN      2022- Yes       [id number] 500mg      Take 1          

 [facility]



     500 mg                                tablet by           ity of



     tablet      00:00: 04:59                          mouth in           [place]



               00   :00                           the            [facility]



                                                  and 1           



                                                  tablet in           



                                                  the            



                                                  evening.           



                                                  Take with           



                                                  meals. Do           



                                                  all this           



                                                  for 30           



                                                  days.           

 

     dulaglutide      2022- Yes       [id number] .75mg      inject 1      

     [facility]



     (TRULICITY)                                Pen under           it

y of



     0.75 mg/0.5      00:00: 04:59                          the skin           [facility]

[place]



     mL PnIj      00   :00                           weekly for           [facility]

l



                                                  30 days.           [facility]

 

     linezolid      2022- Yes       [id number] 600mg      Take 1          

 [facility]



     600 mg                                tablet by           ity of



     tablet      00:00: 04:59                          mouth           [place]



               00   :00                           every 12           [facility]



                                                  (twelve)           [place]



                                                  hours for           



                                                  14 days.           

 

     fluconazole      2022- Yes       [id number] 400mg      Take 2        

   [facility]



     200 mg                                tablets by           ity of



     tablet      00:00: 04:59                          mouth in           [place]



               00   :00                           the            [facility]



                                                  for 14           



                                                  days.           

 

     ciprofloxac      2022- Yes       [id number] 500mg      Take 2        

   [facility]



     in HCl 250                                tablets by           it

y of



     mg tablet      00:00: 04:59                          mouth in           [facility]

as



               00   :00                           the            [facility]



                                                  and 2           



                                                  tablets in           



                                                  the            



                                                  evening.           



                                                  Do all           



                                                  this for           



                                                  14 days.           

 

     linezolid      2022- Yes       [id number] 600mg      Take 1          

 [facility]



     600 mg                                tablet by           ity of



     tablet      00:00: 04:59                          mouth           [place]



               00   :00                           every 12           [facility]



                                                  (twelve)           [place]



                                                  hours for           



                                                  14 days.           

 

     fluconazole      2022- Yes       [id number] 400mg      Take 2        

   [facility]



     200 mg      8-27                          tablets by           ity of



     tablet      00:00: 04:59                          mouth in           [place]



               00   :00                           the            [facility]



                                                  for 14           



                                                  days.           

 

     ciprofloxac      2022- Yes       [id number] 500mg      Take 2        

   [facility]



     in HCl 250      -                          tablets by           it

y of



     mg tablet      00:00: 04:59                          mouth in           [place]

as



               00   :00                           the            Medical



                                                  morning           [facility]



                                                  and 2           



                                                  tablets in           



                                                  the            



                                                  evening.           



                                                  Do all           



                                                  this for           



                                                  14 days.           

 

     linezolid      2022- Yes       [id number] 600mg      Take 1          

 [facility]



     600 mg      8-12 08-27                          tablet by           ity of



     tablet      00:00: 04:59                          mouth           [place]



               00   :00                           every 12           [facility]



                                                  (twelve)           [place]



                                                  hours for           



                                                  14 days.           

 

     fluconazole      -2022- Yes       [id number] 400mg      Take 2        

   [facility]



     200 mg      8-12 08-27                          tablets by           ity of



     tablet      00:00: 04:59                          mouth in           [place]



               00   :00                           the            [facility]



                                                  for 14           



                                                  days.           

 

     ciprofloxac      2022- Yes       [id number] 500mg      Take 2        

   [facility]



     in HCl 250      8- 08-27                          tablets by           it

y of



     mg tablet      00:00: 04:59                          mouth in           [place]

as



               00   :00                           the            Medical



                                                  morning           [facility]



                                                  and 2           



                                                  tablets in           



                                                  the            



                                                  evening.           



                                                  Do all           



                                                  this for           



                                                  14 days.           

 

     linezolid      2022- Yes       [id number] 600mg      Take 1          

 [facility]



     600 mg      8- 08-27                          tablet by           ity of



     tablet      00:00: 04:59                          mouth           [place]



               00   :00                           every 12           [facility]



                                                  (twelve)           [place]



                                                  hours for           



                                                  14 days.           

 

     fluconazole      2022- Yes       [id number] 400mg      Take 2        

   [facility]



     200 mg      8--27                          tablets by           ity of



     tablet      00:00: 04:59                          mouth in           [place]



               00   :00                           the            [facility]



                                                  for 14           



                                                  days.           

 

     ciprofloxac      2022- Yes       [id number] 500mg      Take 2        

   [facility]



     in HCl 250      8-27                          tablets by           it

y of



     mg tablet      00:00: 04:59                          mouth in           [place]

as



               00   :00                           the            Medical



                                                  morning           [facility]



                                                  and 2           



                                                  tablets in           



                                                  the            



                                                  evening.           



                                                  Do all           



                                                  this for           



                                                  14 days.           

 

     gabapentin      -2022- Yes       [id number] 300mg      Take 1         

  [facility]



     300 mg      8- 08-20                          capsule by           ity of



     capsule      00:00: 04:59                          mouth in           [place]



               00   :00                           the            Medical



                                                  morning           [facility]



                                                  and 1           



                                                  capsule at           



                                                  noon and 1           



                                                  capsule in           



                                                  the            



                                                  evening.           



                                                  Do all           



                                                  this for 7           



                                                  days.           

 

     gabapentin      -2022- Yes       [id number] 300mg      Take 1         

  [facility]



     300 mg      8- 08-20                          capsule by           ity of



     capsule      00:00: 04:59                          mouth in           [place]



               00   :00                           the            Medical



                                                  morning           [facility]



                                                  and 1           



                                                  capsule at           



                                                  noon and 1           



                                                  capsule in           



                                                  the            



                                                  evening.           



                                                  Do all           



                                                  this for 7           



                                                  days.           

 

     gabapentin      2022- Yes       [id number] 300mg      Take 1         

  [facility]



     300 mg                                capsule by           ity of



     capsule      00:00: 04:59                          mouth in           [place]



               00   :00                           the            [facility]



                                                  and 1           



                                                  capsule at           



                                                  noon and 1           



                                                  capsule in           



                                                  the            



                                                  evening.           



                                                  Do all           



                                                  this for 7           



                                                  days.           

 

     gabapentin      2022- Yes       [id number] 300mg      Take 1         

  [facility]



     300 mg                                capsule by           ity of



     capsule      00:00: 04:59                          mouth in           [place]



               00   :00                           the            [facility]



                                                  and 1           



                                                  capsule at           



                                                  noon and 1           



                                                  capsule in           



                                                  the            



                                                  evening.           



                                                  Do all           



                                                  this for 7           



                                                  days.           

 

     sodium      2022- No        [id number]           Apply to           U

[facility]



     hypochlorit                                area(s)           ity 

of



     e 0.25%      00:00: 00:00                          daily.           [place]



     solution      00   :00                                          [facility]

 

     repaglinide      2022- No        [id number] .5mg      Take 1         

  [facility]



     0.5 mg                                tablet by           ity of



     tablet      00:00: 00:00                          mouth in           [place]



               00   :00                           the            [facility]



                                                  and 1           



                                                  tablet at           



                                                  noon and 1           



                                                  tablet in           



                                                  the            



                                                  evening.           



                                                  Take           



                                                  before           



                                                  meals. Do           



                                                  all this           



                                                  for 30           



                                                  days.           

 

     Sliding      2022- No                       Subcutaneo           Uni

vers



     Scale                                , TID           ity of



     Insulin -      22:00: 12:34                          MEALS+HS,           [place]

[place]



     Lispro      00   :51                           First dose           Medical



     (HumaLOG) +                                         (after           [place]



     Fsbg                                         last           



     Testing                                         modificati           



                                                  on) on u           



                                                  22 at           



                                                  1700,           



                                                  Until           



                                                  Discontinu           



                                                  ed,            



                                                  Routine           

 

     acetaminoph            Yes            1000mg      1,000 mg,          

 [facility]



     en        8                               Oral, Q8H,           ity of



     (TYLENOL)      19:00:                               First dose           [facility]

[place]



     tablet      00                                 on u           Medical



     1,000 mg                                         22 at           [facility]

h



                                                  1400,           



                                                  Until           



                                                  Discontinu           



                                                  ed,            



                                                  Routine           

 

     methocarbam            Yes            500mg      500 mg,           Un

[place]



     oL        8                               Oral, Q6H,           ity of



     (ROBAXIN)      17:00:                               First dose           [facility]

[place]



     tablet 500      00                                 on u           Medical



     mg                                           22 at           [facility]



                                                  1200,           



                                                  Until           



                                                  Discontinu           



                                                  ed,            



                                                  Routine           

 

     Sliding       No                       Subcutaneo           Uni

vers



     Scale                                us, TID           ity of



     Insulin -      17:00: 19:31                          MEALS+HS,           [facility]

[place]



     Lispro      00   :30                           First dose           Medical



     (HumaLOG) +                                         (after           [facility]



     Fsbg                                         last           



     Testing                                         modificati           



                                                  on) on Thu           



                                                  22 at           



                                                  1200,           



                                                  Until           



                                                  Discontinu           



                                                  ed,            



                                                  Routine           

 

     insulin NPH            Yes            24U       24 Units,           U

nivers



     (HUMULIN N)                                     Subcutaneo           it

y of



     injection      14:00:                               us, QAM,           [place]

[facility]



     24 Units      00                                 First dose           Medic

al



                                                  (after           [facility]



                                                  last           



                                                  modificati           



                                                  on) on Thu           



                                                  22 at           



                                                  0900,           



                                                  Until           



                                                  Discontinu           



                                                  ed,            



                                                  Routine           

 

     insulin NPH       No             20U       20 Units,           

[facility]



     (HUMULIN N)                                Subcutaneo           i

ty of



     injection      14:00: 19:31                          us, QAM,           [facility]

as



     20 Units      00   :02                           First dose           Medic

al



                                                  (after           Branch



                                                  last           



                                                  modificati           



                                                  on) on Thu           



                                                  22 at           



                                                  0900,           



                                                  Until           



                                                  Discontinu           



                                                  ed,            



                                                  Routine           

 

     insulin            Yes            5U        5 Units,           Univer

s



     lispro                                     Subcutaneo           ity of



     (human)      13:45:                               us, TID           [place]



     (HumaLOG      00                                 MEALS,           Medical



     U-100)                                         First dose           [facility]



     injection 5                                         (after           



     Units                                         last           



                                                  modificati           



                                                  on) on Thu           



                                                  22 at           



                                                  0845,           



                                                  Until           



                                                  Discontinu           



                                                  ed,            



                                                  Routine           

 

     apixaban            Yes            5mg       5 mg,           [facility]



     (ELIQUIS)                                     Oral, BID,           ity 

of



     tablet 5 mg      13:00:                               First dose           

[place]



               00                                 on u           Medical



                                                  22 at           [facility]



                                                  0800,           



                                                  Until           



                                                  Discontinu           



                                                  ed,            



                                                  Routine<br           



                                                  >Indicatio           



                                                  ns: DVT/PE           

 

     celecoxib            Yes            100mg      100 mg,           Univ

ers



     (CELEBREX)                                     Oral, BID           ity 

of



     capsule 100      13:00:                               MEALS,           [place]

[facility]



     mg        00                                 First dose           Medical



                                                  on u           [facility]



                                                  22 at           



                                                  0800,           



                                                  Until           



                                                  Discontinu           



                                                  ed,            



                                                  Routine           

 

     gabapentin            Yes            300mg      300 mg,           Uni

vers



     (NEURONTIN)                                     Oral, TID,           it

y of



     capsule 300      13:00:                               First dose           

[place]



     mg        00                                 on Thu           Medical



                                                  22 at           [facility]



                                                  0800,           



                                                  Until           



                                                  Discontinu           



                                                  ed,            



                                                  Routine           

 

     HYDROmorpho            Yes            4mg       4 mg,           Unive

rs



     ne                                       Oral, TID,           ity of



     (DILAUDID)      13:00:                               First dose           [facility]

[facility]



     tablet 4 mg      00                                 (after           Medica

l



                                                  last           [facility]



                                                  modificati           



                                                  on) on 22 at           



                                                  0800,           



                                                  Until           



                                                  Discontinu           



                                                  ed,            



                                                  Routine           

 

     linezolid      2022- No             600mg      600 mg,           Uni

vers



     (ZYVOX)                                Oral,           ity of



     tablet 600      03:15: 03:49                          Q12H, 1           [facility]

as



     mg        00   :00                           dose,           Medical



                                                  First dose           [facility]



                                                  on Wed           



                                                  8/10/22 at           



                                                  2215,           



                                                  ASAP<br>Re           



                                                  ason for           



                                                  Anti-Infec           



                                                  tive:           



                                                  Documented           



                                                  Infection<           



                                                  br>Documen           



                                                  huma            



                                                  Infection           



                                                  Site: Skin           



                                                  / Soft           



                                                  Tissue<br>           



                                                  Duration           



                                                  of             



                                                  Therapy: 7           



                                                  days<br>Re           



                                                  stricted           



                                                  use            



                                                  approved           



                                                  by: AUSTIN SPANGLER,           



                                                  ADULT ID           

 

     heparin      2022- No             5000U      5,000           [facility]



     (porcine)                                Units,           ity of



     injection      01:00: 12:21                          Subcutaneo           T

[place]



     5,000 Units      00   :15                           us, BID,           Medi

[facility]



                                                  First dose           [facility]



                                                  on Wed           



                                                  8/10/22 at           



                                                  2000,           



                                                  Until           



                                                  Discontinu           



                                                  ed,            



                                                  Routine           

 

     insulin            Yes            10U       10 Units,           Unive

rs



     lispro      8-10                               Subcutaneo           ity of



     (human)      22:00:                               us, TID           [place]



     (HumaLOG      00                                 MEALS,           Medical



     U-100)                                         First dose           [facility]



     injection                                         (after           



     10 Units                                         last           



                                                  modificati           



                                                  on) on Wed           



                                                  8/10/22 at           



                                                  1700,           



                                                  Until           



                                                  Discontinu           



                                                  ed,            



                                                  Routine           

 

     insulin NPH            Yes            20U       20 Units,           U

nivers



     (HUMULIN N)      8-10                               Subcutaneo           it

y of



     injection      22:00:                               us, QPM,           [place]

[facility]



     20 Units      00                                 First dose           Medic

al



                                                  (after           [facility]



                                                  last           



                                                  modificati           



                                                  on) on Wed           



                                                  8/10/22 at           



                                                  1700,           



                                                  Until           



                                                  Discontinu           



                                                  ed,            



                                                  Routine           

 

     insulin NPH      2022- No             20U       20 Units,           

[facility]



     (HUMULIN N)      8-10 08-11                          Subcutaneo           i

ty of



     injection      22:00: 13:38                          us, QPM,           [facility]

as



     20 Units      00   :17                           First dose           Medic

al



                                                  (after           [facility]



                                                  last           



                                                  modificati           



                                                  on) on Wed           



                                                  8/10/22 at           



                                                  1700,           



                                                  Until           



                                                  Discontinu           



                                                  ed,            



                                                  Routine           

 

     HYDROmorpho            Yes            4mg       4 mg,           Unive

rs



     ne        8-10                               Oral,           ity of



     (DILAUDID)      15:00:                               TIDPRN,           [place]

[facility]



     tablet 4 mg      00                                 Starting           Medi

[facility]



                                                  on Wed           [facility]



                                                  8/10/22 at           



                                                  1000,           



                                                  Until           



                                                  Discontinu           



                                                  ed,            



                                                  Routine,           



                                                  Pain           



                                                  (scale           



                                                  7-10)           

 

     HYDROmorpho      2022- No             4mg       4 mg,           Univ

ers



     ne        8-10 08-11                          Oral,           ity of



     (DILAUDID)      15:00: 11:04                          TIDPRN,           [facility]

as



     tablet 4 mg      00   :00                           Starting           Medi

[facility]



                                                  on Wed           [facility]



                                                  8/10/22 at           



                                                  1000,           



                                                  Until Thu           



                                                  22 at           



                                                  0604,           



                                                  Routine,           



                                                  Pain           



                                                  (scale           



                                                  7-10)           

 

     insulin NPH      2022- No             20U       20 Units,           

[facility]



     (HUMULIN N)      8-10 08-10                          Subcutaneo           i

ty of



     injection      14:00: 18:34                          us, QAM,           [facility]

as



     20 Units      00   :03                           First dose           Medic

al



                                                  (after           [facility]



                                                  last           



                                                  modificati           



                                                  on) on Wed           



                                                  8/10/22 at           



                                                  0900,           



                                                  Until           



                                                  Discontinu           



                                                  ed,            



                                                  Routine           

 

     Sliding            Yes                      Subcutaneo           Univ

ers



     Scale      8-10                               us, TID           ity of



     Insulin -      13:00:                               MEALS+HS,           [facility]

as



     Lispro      00                                 First dose           Medical



     (HumaLOG) +                                         (after           [facility]



     Fsbg                                         last           



     Testing                                         modificati           



                                                  on) on Wed           



                                                  8/10/22 at           



                                                  0800,           



                                                  Until           



                                                  Discontinu           



                                                  ed,            



                                                  Routine           

 

     Sliding      2022- No                       Subcutaneo           Uni

vers



     Scale      8-10 -11                          us, TID           ity of



     Insulin -      13:00: 13:39                          MEALS+HS,           [facility]

[place]



     Lispro      00   :23                           First dose           Medical



     (HumaLOG) +                                         (after           [facility]



     Fsbg                                         last           



     Testing                                         modificati           



                                                  on) on Wed           



                                                  8/10/22 at           



                                                  0800,           



                                                  Until           



                                                  Discontinu           



                                                  ed,            



                                                  Routine           

 

     FENTanyl PF       No             25ug      25 mcg,           Un

[facility]



     (SUBLIMAZE      8-10 08-10                          Slow IV           ity o

f



     (PF))      03:00: 03:26                          Push,           [place]



     injection      00   :00                           ONCE, 1           Medical



     25 mcg                                         dose, On           [facility]



                                                  22           



                                                  at 2200,           



                                                  Routine           

 

     Sliding      2022- No                       Subcutaneo           Uni

vers



     Scale      8-09 08-10                          us, TID           ity of



     Insulin -      22:00: 12:44                          MEALS+HS,           [facility]

[facility]



     Lispro      00   :50                           First dose           Medical



     (HumaLOG) +                                         (after           [facility]



     Fsbg                                         last           



     Testing                                         modificati           



                                                  on) on 22 at           



                                                  1700,           



                                                  Until           



                                                  Discontinu           



                                                  ed,            



                                                  Routine           

 

     insulin NPH      2022- No             30U       30 Units,           

[facility]



     (HUMULIN N)      8-09 08-10                          Subcutaneo           i

ty of



     injection      22:00: 12:44                          us, QPM,           [facility]

as



     30 Units      00   :50                           First dose           Medic

al



                                                  (after           [facility]



                                                  last           



                                                  modificati           



                                                  on) on 22 at           



                                                  1700,           



                                                  Until           



                                                  Discontinu           



                                                  ed,            



                                                  Routine           

 

     FENTanyl PF      2022- No             25ug      25 mcg,           Un

[facility]



     (SUBLIMAZE                                Slow IV           ity o

f



     (PF))      17:54: 18:30                          Push,           [place]



     injection      44   :23                           Q5MIN PRN,           Medi

[facility]



     25 mcg                                         4 doses,           [facility]



                                                  Starting           



                                                  on 22 at           



                                                  1254,           



                                                  Until 22 at           



                                                  1330,           



                                                  Routine,           



                                                  Pain           



                                                  (scale           



                                                  7-10),           



                                                  PACU           

 

     ketamine      2022- No                       Intravenou           Un

[place]



     (KETALAR)                                s, ONCE           ity of



     injection      17:26: 17:56                          INTRA           [place]



               00   :00                           PROCEDURE,           Medical



                                                  Starting           [facility]



                                                  on 22 at           



                                                  1226,           



                                                  Until 22 at           



                                                  1256,           



                                                  Routine,           



                                                  Intra-op           

 

     clindamycin      2022- No                       IV             Unive

rs



     in 5 %                                Piggyback,           ity of



     dextrose      17:18: 17:56                          ONCE INTRA           [facility]

[facility]



     (CLEOCIN)      00   :00                           PROCEDURE,           Medi

[facility]



     900 mg/50                                         Starting           [facility]



     mL IV                                         on Tue           



     piggyback                                         22 at           



     RTU                                          1218,           



                                                  Until 22 at           



                                                  1256,           



                                                  Administer           



                                                  over 30           



                                                  Minutes,           



                                                  Intra-op           

 

     bupivacaine      2022- No                       PRN,           Unive

rs



     (preserv                                Starting           ity of



     free) 0.5%      17:00: 17:54                          on Tue           [place]

[facility]



     (SENSORCAIN      00   :05                           22 at           Med

ical



     E MPF) 0.5                                         1200,           [facility]



     % (5 mg/mL)                                         Intra-op           



     10 mL,                                                        



     lidocaine                                                        



     1% (PF)                                                        



     (XYLOCAINE)                                                        



     10 mL                                                        

 

     phenylephri      2022- No                       Intravenou          

 [facility]



     ne                                  s, ONCE           ity of



     (VAZCULEP)      16:41: 17:56                          INTRA           [place]



     injection      00   :00                           PROCEDURE,           Medi

[facility]



                                                  Starting           [facility]



                                                  on 22 at           



                                                  1141,           



                                                  Until 22 at           



                                                  1256,           



                                                  Routine,           



                                                  Intra-op           

 

     dexmedeTOMI      2022- No                       Intravenou          

 [facility]



     Dine                                s, ONCE           ity of



     (PRECEDEX)      16:31: 17:56                          INTRA           [place]



     injection      00   :00                           PROCEDURE,           Medi

sarah



                                                  Starting           [facility]



                                                  on 22 at           



                                                  1131,           



                                                  Until 22 at           



                                                  1256,           



                                                  Routine,           



                                                  Intra-op           

 

     propofoL IV      2022- No                       IV             Unive

rs



     infusion                                Infusion,           ity o

f



               16:31: 17:56                          CONTINUOUS           [place]



               00   :00                           PRN,           Medical



                                                  Starting           [facility]



                                                  on 22 at           



                                                  1131,           



                                                  Until 22 at           



                                                  1256,           



                                                  Routine,           



                                                  Intra-op           

 

     FENTanyl PF      2022- No                       Epidural,           

[facility]



     (SUBLIMAZE                                ONCE INTRA           it

y of



     (PF))      16:31: 17:56                          PROCEDURE,           [place]



     injection      00   :00                           Starting           Medica

l



                                                  on Tue           [facility]



                                                  22 at           



                                                  1131,           



                                                  Until 22 at           



                                                  1256,           



                                                  Routine,           



                                                  Intra-op           

 

     midazolam      2022- No                       IV Push,           Uni

vers



     (VERSED)                                ONCE INTRA           ity 

of



     injection      16:31: 17:56                          PROCEDURE,           [facility]

[place]



               00   :00                           Starting           Medical



                                                  on Tue           [facility]



                                                  22 at           



                                                  1131,           



                                                  Until 22 at           



                                                  1256,           



                                                  Routine,           



                                                  Intra-op           

 

     lactated      2022- No                       IV             [facility]



     ringers IV                                Infusion,           ity

 of



     infusion      16:25: 17:56                          CONTINUOUS           [place]

[place]



               00   :00                           PRN,           Medical



                                                  Starting           [facility]



                                                  on 22 at           



                                                  1125,           



                                                  Until 22 at           



                                                  1256,           



                                                  Routine,           



                                                  Intra-op           

 

     insulin NPH       No             34U       34 Units,           

[facility]



     (HUMULIN N)      8-09 08-10                          Subcutaneo           i

ty of



     injection      14:00: 12:44                          us, QAM,           [facility]

as



     34 Units      00   :50                           First dose           Medic

al



                                                  (after           [facility]



                                                  last           



                                                  modificati           



                                                  on) on 22 at           



                                                  0900,           



                                                  Until           



                                                  Discontinu           



                                                  ed,            



                                                  Routine           

 

     insulin       No             14U       14 Units,           Univ

ers



     lispro      8-09 08-10                          Subcutaneo           ity of



     (human)      13:00: 12:44                          us, TID           [place]



     (HumaLOG      00   :50                           MEALS,           Medical



     U-100)                                         First dose           [facility]



     injection                                         (after           



     14 Units                                         last           



                                                  modificati           



                                                  on) on 22 at           



                                                  0800,           



                                                  Until           



                                                  Discontinu           



                                                  ed,            



                                                  Routine           

 

     insulin NPH       No             32U       32 Units,           

[facility]



     (HUMULIN N)                                Subcutaneo           i

ty of



     injection      22:00: 12:53                          us, QPM,           [facility]

as



     32 Units      00   :44                           First dose           Medic

al



                                                  (after           [facility]



                                                  last           



                                                  modificati           



                                                  on) on 22 at           



                                                  1700,           



                                                  Until           



                                                  Discontinu           



                                                  ed,            



                                                  Routine           

 

     HYDROmorpho       No             4mg       4 mg,           Univ

ers



     ne        8-08 08-10                          Oral,           ity of



     (DILAUDID)      16:30: 14:59                          BIDPRN,           [facility]

as



     tablet 4 mg      00   :38                           Starting           Medi

[facility]



                                                  on 22 at           



                                                  1130,           



                                                  Until Wed           



                                                  8/10/22 at           



                                                  0959,           



                                                  Routine,           



                                                  Pain           



                                                  (scale           



                                                  7-10)           

 

     insulin NPH       No             36U       36 Units,           

[facility]



     (HUMULIN N)                                Subcutaneo           i

ty of



     injection      14:00: 12:53                          us, QAM,           [facility]

as



     36 Units      00   :44                           First dose           Medic

al



                                                  (after           [facility]



                                                  last           



                                                  modificati           



                                                  on) on 22 at           



                                                  0900,           



                                                  Until           



                                                  Discontinu           



                                                  ed,            



                                                  Routine           

 

     insulin      2022- No             15U       15 Units,           Univ

ers



     lispro                                Subcutaneo           ity of



     (human)      13:30: 12:53                          us, TID           [place]



     (HumaLOG      00   :44                           MEALS,           Medical



     U-100)                                         First dose           [facility]



     injection                                         (after           



     15 Units                                         last           



                                                  modificati           



                                                  on) on 22 at           



                                                  0830,           



                                                  Until           



                                                  Discontinu           



                                                  ed,            



                                                  Routine           

 

     cholestyram            Yes                      Topical           Uni

vers



     ine-nystati                                     (Apply To           ity

 of



     n-zinc      13:00:                               Affected           [place]



     oxide      00                                 Areas),           Medical



     ointment                                         BID, First           [facility]

h



     1:1:1                                         dose           



     (COMPOUNDED                                         (after           



     )                                            last           



                                                  modificati           



                                                  on) on 22 at           



                                                  0800,           



                                                  Until           



                                                  Discontinu           



                                                  ed,            



                                                  Routine           

 

     cholestyram            Yes                      Topical           Uni

vers



     ine-nystati                                     (Apply To           ity

 of



     n-zinc      13:00:                               Affected           [place]



     oxide      00                                 Areas),           Medical



     ointment                                         BID, First           [facility]

h



     1:1:1                                         dose           



     (COMPOUNDED                                         (after           



     )                                            last           



                                                  modificati           



                                                  on) on 22 at           



                                                  0800,           



                                                  Until           



                                                  Discontinu           



                                                  ed,            



                                                  Routine           

 

     magnesium       No             4g        4 g, IV           Univ

ers



     sulfate in                                Piggyback,           it

y of



     water 4      12:00: 15:57                          ONCE, 1           [place]



     gram/50 mL      00   :00                           dose, On           Medic

al



     (8 %) IV                                         22           [facility]

h



     Piggyback 4                                         at 0700,           



     g                                            Routine           

 

     eravacyclin       No             1mg/kg      123 mg (1         

  [facility]



     e (XERAVA)      -09                          mg/kg ?123           it

y of



     123 mg in      03:15: 00:10                          kg), IV           [place]

[facility]



     NaCl 0.9%      00   :03                           Infusion,           Medic

al



     (NS) 250 mL                                         Q12H ABX,           Bra

nch



     IV infusion                                         Administer           



                                                  over 60           



                                                  Minutes,           



                                                  First dose           



                                                  on Sun           



                                                  8/7/22 at           



                                                  2215, For           



                                                  7 days           

 

     insulin NPH      2022- No             38U       38 Units,           

[facility]



     (HUMULIN N)                                Subcutaneo           i

ty of



     injection      22:00: 13:14                          us, QPM,           [facility]

as



     38 Units      00   :25                           First dose           Medic

al



                                                  (after           [facility]



                                                  last           



                                                  modificati           



                                                  on) on Sun           



                                                  22 at           



                                                  1700,           



                                                  Until           



                                                  Discontinu           



                                                  ed,            



                                                  Routine           

 

     insulin       No             18U       18 Units,           Univ

ers



     lispro                                Subcutaneo           ity of



     (human)      17:00: 13:15                          us, TID           [place]



     (HumaLOG      00   :02                           MEALS,           Medical



     U-100)                                         First dose           [facility]



     injection                                         (after           



     18 Units                                         last           



                                                  modificati           



                                                  on) on Sun           



                                                  22 at           



                                                  1200,           



                                                  Until           



                                                  Discontinu           



                                                  ed,            



                                                  Routine           

 

     insulin NPH      2022- No             37U       37 Units,           

[facility]



     (HUMULIN N)                                Subcutaneo           i

ty of



     injection      14:00: 14:15                          us, QAM,           [facility]

as



     37 Units      00   :14                           First dose           Medic

al



                                                  (after           [facility]



                                                  last           



                                                  modificati           



                                                  on) on Sun           



                                                  22 at           



                                                  0900,           



                                                  Until           



                                                  Discontinu           



                                                  ed,            



                                                  Routine           

 

     Sliding                             Subcutaneo           Uni

vers



     Scale                                us, TID           ity of



     Insulin -      02:00: 12:53                          MEALS+HS,           [facility]

[place]



     Lispro      00   :44                           First dose           Medical



     (HumaLOG) +                                         (after           [facility]



     Fsbg                                         last           



     Testing                                         modificati           



                                                  on) on Sat           



                                                  22 at           



                                                  2100,           



                                                  Until           



                                                  Discontinu           



                                                  ed,            



                                                  Routine           

 

     HYDROmorpho       No             2mg       2 mg,           Univ

ers



     ne                                  Oral,           ity of



     (DILAUDID)      00:17: 16:23                          Q8HPRN,           [facility]

as



     tablet 2 mg      00   :26                           Starting           Medi

[facility]



                                                  on Sat           [facility]



                                                  22 at           



                                                  1917,           



                                                  Until Mon           



                                                  22 at           



                                                  1123,           



                                                  Routine,           



                                                  Pain           



                                                  (scale           



                                                  7-10)           

 

     insulin      2022- No             17U       17 Units,           Univ

ers



     lispro                                Subcutaneo           ity of



     (human)      22:00: 14:15                          us, TID           [place]



     (HumaLOG      00   :14                           MEALS,           Medical



     U-100)                                         First dose           [facility]



     injection                                         (after           



     17 Units                                         last           



                                                  modificati           



                                                  on) on Sat           



                                                  22 at           



                                                  1700,           



                                                  Until           



                                                  Discontinu           



                                                  ed,            



                                                  Routine           

 

     insulin NPH      2022- No             35U       35 Units,           

[facility]



     (HUMULIN N)                                Subcutaneo           i

ty of



     injection      22:00: 13:38                          us, QPM,           [facility]

as



     35 Units      00   :53                           First dose           Medic

al



                                                  (after           [facility]



                                                  last           



                                                  modificati           



                                                  on) on Sat           



                                                  22 at           



                                                  1700,           



                                                  Until           



                                                  Discontinu           



                                                  ed,            



                                                  Routine           

 

     Sliding                             Subcutaneo           Uni

vers



     Scale      -06                          us, Q4H,           ity of



     Insulin -      21:00: 22:06                          First dose           [facility]

[place]



     Lispro      00   :35                           (after           Medical



     (HumaLOG) +                                         last           [facility]



     Fsbg                                         modificati           



     Testing                                         on) on Sat           



                                                  22 at           



                                                  1600,           



                                                  Until           



                                                  Discontinu           



                                                  ed,            



                                                  Routine           

 

     HYDROmorpho      2022- No             4mg       4 mg,           Univ

ers



     ne                                  Oral,           ity of



     (DILAUDID)      17:24: 00:17                          Q8HPRN,           [facility]

as



     tablet 4 mg      27   :12                           Starting           Medi

[facility]



                                                  on Sat           [facility]



                                                  22 at           



                                                  1224,           



                                                  Until Sat           



                                                  22 at           



                                                  1917,           



                                                  Routine,           



                                                  Pain           



                                                  (scale           



                                                  7-10)           

 

     sodium            Yes                      Topical,           [facility]



     hypochlorit      8-06                               DAILY,           ity of



     e 0.25%      14:00:                               First dose           [place]

[facility]



     (DAKIN'S      00                                 on Sat           [facility]



     SOLUTION)                                         22 at           [facility]

h



     solution                                         0900,           



                                                  Until           



                                                  Discontinu           



                                                  ed, ASAP           

 

     sodium            Yes                      Topical,           [facility]



     hypochlorit      8-06                               DAILY,           ity of



     e 0.25%      14:00:                               First dose           [place]

[facility]



     (DAKIN'S      00                                 on Sat           [facility]



     SOLUTION)                                         22 at           [facility]

h



     solution                                         0900,           



                                                  Until           



                                                  Discontinu           



                                                  ed, ASAP           

 

     insulin      2022- No             12U       12 Units,           Univ

ers



     lispro                                Subcutaneo           ity of



     (human)      14:00: 19:55                          us, TIDPC,           [facility]

as



     (HumaLOG      00   :31                           First dose           Medic

al



     U-100)                                         (after           [facility]



     injection                                         last           



     12 Units                                         modificati           



                                                  on) on Sat           



                                                  22 at           



                                                  0900,           



                                                  Until           



                                                  Discontinu           



                                                  ed,            



                                                  Routine           

 

     insulin NPH      2022- No             26U       26 Units,           

[facility]



     (HUMULIN N)                                Subcutaneo           i

ty of



     injection      14:00: 19:55                          us, QAM,           [facility]

as



     26 Units      00   :31                           First dose           Medic

al



                                                  (after           [facility]



                                                  last           



                                                  modificati           



                                                  on) on Sat           



                                                  22 at           



                                                  0900,           



                                                  Until           



                                                  Discontinu           



                                                  ed,            



                                                  Routine           

 

     Sliding      2022- No                       Subcutaneo           Uni

vers



     Scale       08-06                          us, TID           ity of



     Insulin -      13:00: 19:55                          MEALS+HS,           [facility]

[place]



     Lispro      00   :31                           First dose           Medical



     (HumaLOG) +                                         on Sat           [facility]



     Fsbg                                         22 at           



     Testing                                         0800,           



                                                  Until           



                                                  Discontinu           



                                                  ed,            



                                                  Routine           

 

     glucagon            Yes            1mg       1 mg,           [facility]



     (GLUCAGEN                                     Intramuscu           ity 

of



     DIAGNOSTIC      12:22:                               lar, PRN,           [facility]

[facility]



     KIT)      35                                 Starting           Medical



     injection 1                                         on Sat           [facility]



     mg                                           22 at           



                                                  0722,           



                                                  Until           



                                                  Discontinu           



                                                  ed, ASAP,           



                                                  Blood           



                                                  Glucose           



                                                  &amp;lt;           



                                                  or = 70           



                                                  mg/dL and           



                                                  patient is           



                                                  unable to           



                                                  swallow or           



                                                  has mental           



                                                  changes.           

 

     dextrose 50      0      Yes            25mL      25 mL,           Univ

ers



     % in water                                     Slow IV           ity of



     (D50W)      12:22:                               Push, PRN,           [place]



     injection      35                                 Starting           [facility]

l



     25 mL                                         on Sat           [facility]



                                                  22 at           



                                                  0722,           



                                                  Until           



                                                  Discontinu           



                                                  ed, ASAP,           



                                                  Blood           



                                                  Glucose           



                                                  &amp;lt;           



                                                  or = 70           



                                                  mg/dL and           



                                                  patient is           



                                                  unable to           



                                                  swallow or           



                                                  has mental           



                                                  status           



                                                  changes.           

 

     glucagon            Yes            1mg       1 mg,           [facility]



     (GLUCAGEN                                     Intramuscu           ity 

of



     DIAGNOSTIC      12:22:                               lar, PRN,           [facility]

[facility]



     KIT)      35                                 Starting           Medical



     injection 1                                         on Sat           [facility]



                                                22 at           



                                                  0722,           



                                                  Until           



                                                  Discontinu           



                                                  ed, ASAP,           



                                                  Blood           



                                                  Glucose           



                                                  &amp;lt;           



                                                  or = 70           



                                                  mg/dL and           



                                                  patient is           



                                                  unable to           



                                                  swallow or           



                                                  has mental           



                                                  changes.           

 

     dextrose 50      0      Yes            25mL      25 mL,           Univ

ers



     % in water                                     Slow IV           ity of



     (D50W)      12:22:                               Push, PRN,           [place]



     injection      35                                 Starting           [facility]

l



     25 mL                                         on Sat           [facility]



                                                  22 at           



                                                  0722,           



                                                  Until           



                                                  Discontinu           



                                                  ed, ASAP,           



                                                  Blood           



                                                  Glucose           



                                                  &amp;lt;           



                                                  or = 70           



                                                  mg/dL and           



                                                  patient is           



                                                  unable to           



                                                  swallow or           



                                                  has mental           



                                                  status           



                                                  changes.           

 

     HYDROmorpho      2022- No             .2mg      0.2 mg,           Un

[facility]



     ne        806 08-06                          Slow IV           ity of



     (DILAUDID)      01:49: 02:17                          Push, PRN,           

[place]



     injection      34   :00                           1 dose,           Medical



     0.2 mg                                         Starting           [facility]



                                                  on 22 at           



                                                  204,           



                                                  Until 22 at           



                                                  ,           



                                                  Routine,           



                                                  Pain           



                                                  (scale           



                                                  7-10)<br>U           



                                                  se             



                                                  approved           



                                                  by             



                                                  (Faculty):           



                                                  [facility]           



                                                  [facility]           

 

     KCL 20                   40meq      40 mEq,           Un[facility]

s



     mEq/15 mL                                Oral,           ity of



     solution 40      23:30: 22:58                          ONCE, 1           [facility]

[facility]



     mEq       00   :00                           dose, On           Medical



                                                  22           [facility]



                                                  at 1830,           



                                                  Routine           

 

     Sliding                             Subcutaneo           Uni

vers



     Scale                                us, Q4H,           ity of



     Insulin -      22:15: 12:23                          First dose           [facility]

[place]



     Lispro      00   :48                           on Fri           Medical



     (HumaLOG) +                                         22 at           [facility]



     Fsbg                                         1715,           



     Testing                                         Until           



                                                  Discontinu           



                                                  ed,            



                                                  Routine           

 

     insulin NPH                   22U       22 Units,           

[facility]



     (HUMULIN N)                                Subcutaneo           i

ty of



     injection      22:00: 19:55                          us, QPM,           [facility]

as



     22 Units      00   :31                           First dose           Medic

al



                                                  (after           [facility]



                                                  last           



                                                  modificati           



                                                  on) on 22 at           



                                                  1700,           



                                                  Until           



                                                  Discontinu           



                                                  ed,            



                                                  Routine           

 

     magnesium                   4g        4 g, IV           Univ

ers



     sulfate in                                Piggyback,           it

y of



     water 4      19:15: 20:22                          ONCE, 1           [place]



     gram/50 mL      00   :00                           dose, On           Medic

al



     (8 %) IV                                         22           [facility]

h



     Piggyback 4                                         at 1415,           



     g                                            Routine           

 

     HYDROmorpho                   .2mg      0.2 mg,           Un

[facility]



     ne                                  Slow IV           ity of



     (DILAUDID)      18:00: 18:07                          Push,           [place]



     injection      00   :00                           ONCE, 1           Medical



     0.2 mg                                         dose, On           [facility]



                                                  22           



                                                  at 1300,           



                                                  Routine<br           



                                                  >Use           



                                                  approved           



                                                  by             



                                                  (Faculty):           



                                                  [facility]           



                                                  CARE [facility]           

 

     insulin      2022- No             6U        6 Units,           Unive

rs



     lispro                                Subcutaneo           ity of



     (human)      14:00: 00:41                          us, TIDPC,           [facility]

as



     (HumaLOG      00   :23                           First dose           Medic

al



     U-100)                                         (after           [facility]



     injection 6                                         last           



     Units                                         modificati           



                                                  on) on Fri           



                                                  22 at           



                                                  0900,           



                                                  Until           



                                                  Discontinu           



                                                  ed,            



                                                  Routine           

 

     insulin NPH       No             22U       22 Units,           

[facility]



     (HUMULIN N)                                Subcutaneo           i

ty of



     injection      14:00: 21:37                          us, QAM,           [facility]

as



     22 Units      00   :09                           First dose           Medic

al



                                                  on Fri           [facility]



                                                  22 at           



                                                  0900,           



                                                  Until           



                                                  Discontinu           



                                                  ed,            



                                                  Routine           

 

     HYDROmorpho                   .2mg      0.2 mg,           Un

[place]



     ne                                  Slow IV           ity of



     (DILAUDID)      08:15: 07:57                          Push,           [place]



     injection      00   :00                           ONCE, 1           Medical



     0.2 mg                                         dose, On           [facility]



                                                  Fri 22           



                                                  at 0315,           



                                                  Routine<br           



                                                  >Use           



                                                  approved           



                                                  by             



                                                  (Faculty):           



                                                  [facility]           



                                                  CARE UNIT           

 

     HYDROcodone                   1{tbl}      1 tablet,         

  [facility]



     -acetaminop                                Oral,           ity of



     hen (NORCO)      01:32: 17:25                          Q6HPRN,           [facility]

[facility]



      mg      23   :31                           Starting           [facility]

l



     tablet 1                                         on Thu           [facility]



     tablet                                         22 at           



                                                  2032,           



                                                  Until Sat           



                                                  22 at           



                                                  1225,           



                                                  Routine,           



                                                  Pain           



                                                  (scale           



                                                  7-10)           

 

     Sliding                             Subcutaneo           Uni

vers



     Scale                                us, Q4H,           ity of



     Insulin -      01:00: 21:37                          First dose           [facility]

[place]



     lispro      00   :09                           on Thu           Medical



     (humaLOG) +                                         22 at           [facility]



     Fsbg                                         ,           



     Testing                                         Until           



                                                  Discontinu           



                                                  ed,            



                                                  Routine           

 

     meropenem       No             1000mg      1,000 mg,           

[facility]



     (MERREM)      -08                          IV             ity of



     1,000 mg in      00:15: 02:14                          Piggyback,          

 [place]



     NaCl 0.9%      00   :44                           Q8H ABX,           Medica

l



     (NS) 50 mL                                         21 doses,           [facility]

ch



     MINI-BAG                                         First dose           



                                                  on Thu           



                                                  22 at           



                                                  1915, Last           



                                                  dose on           



                                                  u            



                                                  22 at           



                                                  1115,           



                                                  Administer           



                                                  over 3           



                                                  Hours, 50           



                                                  mL<br>Rest           



                                                  ricted use           



                                                  approved           



                                                  by: POP           



                                                  8TH            



                                                  FLOOR<br>R           



                                                  [place] for           



                                                  Anti-Infec           



                                                  tive:           



                                                  Documented           



                                                  Infection<           



                                                  br>Documen           



                                                  huma            



                                                  Infection           



                                                  Site:           



                                                  Urine<br>D           



                                                  uration of           



                                                  Therapy: 7           



                                                  days           

 

     HYDROmorpho      2022- No             .2mg      0.2 mg,           Un

[place]



     ne                                  Slow IV           ity of



     (DILAUDID)      00:00: 23:13                          Push,           [place]



     injection      00   :00                           ONCE, 1           Medical



     0.2 mg                                         dose, On           [facility]



                                                  Thu 22           



                                                  at 1900,           



                                                  Routine<br           



                                                  >Use           



                                                  approved           



                                                  by             



                                                  (Faculty):           



                                                  INTENSIVE           



                                                  CARE UNIT           

 

     HYDROmorpho      2022- No             .2mg      0.2 mg,           Un

[facility]



     ne                                  Slow IV           ity of



     (DILAUDID)      19:30: 18:59                          Push,           [place]



     injection      00   :00                           ONCE, 1           Medical



     0.2 mg                                         dose, On           [facility]



                                                  Thu 22           



                                                  at 1430,           



                                                  Routine<br           



                                                  >Use           



                                                  approved           



                                                  by             



                                                  (Faculty):           



                                                  INTENSIVE           



                                                  CARE UNIT           

 

     cholestyram      2022- No                       Topical           Un

[facility]



     ine-nystati                                (Apply To           it

y of



     n-zinc      18:29: 12:46                          Affected           [place]



     oxide      12   :35                           Areas),           Medical



     ointment                                         PRN,           [facility]



     1:1:1                                         Starting           



     (COMPOUNDED                                         on Thu           



     )                                            22 at           



                                                  1329,           



                                                  Until Mon           



                                                  22 at           



                                                  0746,           



                                                  Routine,           



                                                  Wound           



                                                  care,           



                                                  Cuts,           



                                                  abrasions,           



                                                  and skin           



                                                  ulcers           

 

     iopamidol      2022- No        [id number] 60mL      60 mL,           

[facility]



     (ISOVUE                                Intravenou           ity o

f



     370-500 mL)      17:25: 05:25                          s, ONCE, 1          

 [place]



     injection      00   :00                           dose, On           Medica

l



     60 mL                                         u 22           [facility]



                                                  at 1230,           



                                                  Routine           

 

     meropenem      2022- No             1000mg      1,000 mg,           

[facility]



     (MERREM)                                IV             ity of



     1,000 mg in      16:45: 20:14                          Piggyback,          

 [place]



     NaCl 0.9%      00   :00                           ONCE, 1           Medical



     (NS) 50 mL                                         dose, On           Branc

h



     MINI-BAG                                         Thu 22           



                                                  at 1145,           



                                                  Administer           



                                                  over 30           



                                                  Minutes,           



                                                  50             



                                                  mL<br&gt;R           



                                                  estricted           



                                                  use            



                                                  approved           



                                                  by: POP           



                                                  8TH            



                                                  FLOOR<br>R           



                                                  elmira for           



                                                  Anti-Infec           



                                                  tive:           



                                                  Documented           



                                                  Infection<           



                                                  br>Documen           



                                                  huma            



                                                  Infection           



                                                  Site:           



                                                  Urine<br>D           



                                                  uration of           



                                                  Therapy: 7           



                                                  days           

 

     pantoprazol      -0      Yes            40mg      40 mg,           Univ

ers



     e                                        Oral,           ity of



     (PROTONIX)      14:00:                               DAILY,           [place]



     EC tablet      00                                 First dose           Medi

sarha



     40 mg                                         on u           [facility]



                                                  22 at           



                                                  0900,           



                                                  Until           



                                                  Discontinu           



                                                  ed,            



                                                  Routine<br           



                                                  >Indicatio           



                                                  n for use:           



                                                  None of           



                                                  the above           

 

     pantoprazol      0      Yes            40mg      40 mg,           Univ

ers



     e                                        Oral,           ity of



     (PROTONIX)      14:00:                               DAILY,           [place]



     EC tablet      00                                 First dose           Medi

sarah



     40 mg                                         on u           [facility]



                                                  22 at           



                                                  0900,           



                                                  Until           



                                                  Discontinu           



                                                  ed,            



                                                  Routine<br           



                                                  >Indicatio           



                                                  n for use:           



                                                  None of           



                                                  the above           

 

     heparin      -0      Yes            5000U      5,000           [facility]



     (porcine)      04                               Units,           ity of



     injection      13:00:                               Subcutaneo           [facility]

[place]



     5,000 Units      00                                 us, Q12H,           Med

ical



                                                  First dose           [facility]



                                                  on u           



                                                  22 at           



                                                  0800,           



                                                  Until           



                                                  Discontinu           



                                                  ed,            



                                                  Routine           

 

     heparin      -0 - No             5000U      5,000           [facility]



     (porcine)       08-10                          Units,           ity of



     injection      13:00: 21:57                          Subcutaneo           [facility]

[place]



     5,000 Units      00   :33                           us, Q12H,           Med

ical



                                                  First dose           [facility]



                                                  on u           



                                                  22 at           



                                                  0800,           



                                                  Until           



                                                  Discontinu           



                                                  ed,            



                                                  Routine           

 

     NaCl 0.9%      0      Yes            10mL      10 mL,           Univer

s



     (NS)                                     Slow IV           ity of



     injection      11:28:                               Push, PRN,           [facility]

[place]



     10 mL      11                                 Starting           Medical



                                                  on u           [facility]



                                                  22 at           



                                                  0628,           



                                                  Until           



                                                  Discontinu           



                                                  ed,            



                                                  Routine,           



                                                  line           



                                                  maintenanc           



                                                  e              

 

     lidocaine      -0      Yes            5mL       5 mL,           [facility]



     1% (PF)                                     Subcutaneo           ity of



     (XYLOCAINE)      11:28:                               us, PRN,           Te

[place]



     injection 5      11                                 Starting           Medi

sarah



     mL                                           on u           [facility]



                                                  22 at           



                                                  0628,           



                                                  Until           



                                                  Discontinu           



                                                  ed,            



                                                  Routine,           



                                                  Local           



                                                  anesthesia           

 

     NaCl 0.9%      0      Yes            10mL      10 mL,           Univer

s



     (NS)                                     Slow IV           ity of



     injection      11:28:                               Push, PRN,           Te

[place]



     10 mL      11                                 Starting           Medical



                                                  on u           [facility]



                                                  22 at           



                                                  0628,           



                                                  Until           



                                                  Discontinu           



                                                  ed,            



                                                  Routine,           



                                                  line           



                                                  maintenanc           



                                                  e              

 

     lidocaine            Yes            5mL       5 mL,           [facility]



     1% (PF)                                     Subcutaneo           ity of



     (XYLOCAINE)      11:28:                               us, PRN,           Te

[place]



     injection 5      11                                 Starting           Medi

sarah



     mL                                           on u           [facility]



                                                  22 at           



                                                  0628,           



                                                  Until           



                                                  Discontinu           



                                                  ed,            



                                                  Routine,           



                                                  Local           



                                                  anesthesia           

 

     NaCl 0.9%      2022- No             1000mL      at 999           Uni

vers



     (NS) bolus                                mL/hr,           ity of



     infusion      07:30: 06:55                          1,000 mL,           Eric

as



     1,000 mL      00   :00                           IV             Medical



                                                  Infusion,           [facility]



                                                  ONCE, 1           



                                                  dose, On           



                                                  u 22           



                                                  at 0230,           



                                                  ASAP           

 

     FENTanyl PF       No             50ug      50 mcg,           Un

[facility]



     (SUBLIMAZE                                Slow IV           ity o

f



     (PF))      06:45: 06:01                          Push,           [place]



     injection      00   :00                           ONCE, 1           Medical



     50 mcg                                         dose, On           [facility]



                                                  u 22           



                                                  at 0145,           



                                                  Routine           

 

     cefTRIAXone      2022- No             1000mg      1,000 mg,         

  [facility]



     (ROCEPHIN)                                IV             ity of



     1,000 mg in      05:30: 06:00                          [facility]          

 [place]



     NaCl 0.9%      00   :00                           ONCE, 1           Medical



     (NS) 50 mL                                         dose, On           B[facility]

h



     MINI-BAG                                         Thu 22           



                                                  at 0030,           



                                                  Administer           



                                                  over 30           



                                                  Minutes,           



                                                  50             



                                                  mL<br&gt;R           



                                                  elmira for           



                                                  Anti-Infec           



                                                  tive:           



                                                  Empiric           



                                                  Therapy           



                                                  for            



                                                  Suspected           



                                                  Infection<           



                                                  br>Empiric           



                                                  Therapy           



                                                  Site:           



                                                  Urine<br>D           



                                                  uration of           



                                                  therapy:           



                                                  72 hours           

 

     NaCl 0.9%      2022- No             1000mL      at 999           Uni

vers



     (NS) bolus      -                          mL/hr,           ity of



     infusion      05:30: 07:58                          1,000 mL,           Eric

as



     1,000 mL      00   :00                           IV             Medical



                                                  Infusion,           [facility]



                                                  ONCE, 1           



                                                  dose, On           



                                                  Thu 22           



                                                  at 0030,           



                                                  ASAP           

 

     NaCl 0.9%      2022- No             1000mL      at 999           Uni

vers



     (NS) bolus      -                          mL/hr,           ity of



     infusion      05:15: 07:58                          1,000 mL,           [facility]

as



     1,000 mL      00   :00                           IV             Medical



                                                  Infusion,           [facility]



                                                  ONCE, 1           



                                                  dose, On           



                                                  Thu 22           



                                                  at 0015,           



                                                  ASAP           

 

     D5W 0.45%      2022- No                       IV             [facility]



     NaCl                                Infusion,           ity of



     (1/2NS) 1 L      04:53: 22:22                          at 200           Eric

as



     + KCL 20      49   :19                           mL/hr, PRN           Medic

al



     mEq                                          - SEE           [facility]



                                                  INSTRUCTIO           



                                                  NS,            



                                                  Starting           



                                                  on Wed           



                                                  8/3/22 at           



                                                  2353,           



                                                  Until 22 at           



                                                  1722,           



                                                  ASAP,           



                                                  Blood           



                                                  glucose           



                                                  control           

 

     proMETHazin      2022- No             25mg      25 mg, IV           

[facility]



     e                                   Piggyback,           ity of



     (PHENERGAN)      04:15: 04:22                          ONCE, 1           Te

[place]



     25 mg in      00   :00                           dose, On           Medical



     NaCl 0.9%                                         Wed 8/3/22           [facility]

ch



     (NS) 50 mL                                         at 2315,           



     IV                                           ASAP           



     piggyback                                                        

 

     proMETHazin      2022- No             12.5mg      12.5 mg,          

 [facility]



     e          07-30                          IV             ity of



     (PHENERGAN)      21:30: 22:14                          Piggyback,          

 [place]



     12.5 mg in      00   :00                           ONCE, 1           Medica

l



     NaCl 0.9%                                         dose, On           [facility]



     (NS) 50 mL                                         Sat            



     IV                                           22 at           



     piggyback                                         1630, 50           



                                                  mL             

 

     lactated            Yes            1000mL      at 50           Univer

s



     ringers IV      7-30                               mL/hr,           ity of



     infusion      19:15:                               1,000 mL,           [place]

[facility]



     1,000 mL      00                                 IV             Medical



                                                  Infusion,           [facility]



                                                  CONTINUOUS           



                                                  , Starting           



                                                  on Sat           



                                                  22 at           



                                                  1415,           



                                                  Until           



                                                  Discontinu           



                                                  ed,            



                                                  Routine           

 

     insulin            Yes            7U        7 Units,           Univer

s



     lispro      7-30                               Subcutaneo           ity of



     (human)      17:00:                               us, TID           [place]



     (HumaLOG      00                                 MEALS,           Medical



     U-100)                                         First dose           [facility]



     injection 7                                         (after           



     Units                                         last           



                                                  modificati           



                                                  on) on Sat           



                                                  22 at           



                                                  1200,           



                                                  Until           



                                                  Discontinu           



                                                  ed,            



                                                  Routine           

 

     insulin            Yes            40U       40 Units,           Unive

rs



     glargine      7-30                               Subcutaneo           ity o

f



     (LANTUS      14:00:                               us, DAILY,           [place]

[facility]



     U-100)      00                                 First dose           Medical



     injection                                         (after           [facility]



     40 Units                                         last           



                                                  modificati           



                                                  on) on Sat           



                                                  22 at           



                                                  0900,           



                                                  Until           



                                                  Discontinu           



                                                  ed,            



                                                  Routine           

 

     insulin NPH      2022- Yes       [id number] 50U       inject 50      

     [facility]



     and regular      7-30 08-30                          Units           ity of



     human 70-30      00:00: 04:59                          under the           

[place]



     100 unit/mL      00   :00                           skin every           Me

dical



     (70-30)                                         morning           [facility]



     injection                                         and            



                                                  evening           



                                                  for 30           



                                                  days.           

 

     proMETHazin      2022- Yes       [id number] 12.5mg      Insert 1     

      [facility]



     e 12.5 mg       08-30                          Suppositor           ity

 of



     suppository      00:00: 04:59                          y into           [facility]

as



               00   :00                           rectum           Medical



                                                  every 6           [facility]



                                                  (six)           



                                                  hours as           



                                                  needed for           



                                                  Nausea and           



                                                  Vomiting           



                                                  (N/V) for           



                                                  up to 30           



                                                  days.           

 

     proMETHazin      2022- Yes       [id number] 12.5mg      Take 0.5     

      [facility]



     e 25 mg       08-30                          tablets by           ity o

f



     tablet      00:00: 04:59                          mouth           [place]



               00   :00                           every 4           Medical



                                                  (four)           [facility]anch



                                                  hours as           



                                                  needed for           



                                                  Nausea and           



                                                  Vomiting           



                                                  (N/V) for           



                                                  up to 30           



                                                  days.           

 

     insulin NPH      2022- Yes       [id number] 50U       inject 50      

     [facility]



     and regular      7-30 08-30                          Units           ity of



     human 70-30      00:00: 04:59                          under the           

[place]



     100 unit/mL      00   :00                           skin every           Me

dical



     (70-30)                                         morning           [facility]



     injection                                         and            



                                                  evening           



                                                  for 30           



                                                  days.           

 

     proMETHazin      2022- Yes       [id number] 12.5mg      Insert 1     

      [facility]



     e 12.5 mg      7-30 08-30                          Suppositor           ity

 of



     suppository      00:00: 04:59                          y into           [facility]

as



               00   :00                           rectum           Medical



                                                  every 6           [facility]



                                                  (six)           



                                                  hours as           



                                                  needed for           



                                                  Nausea and           



                                                  Vomiting           



                                                  (N/V) for           



                                                  up to 30           



                                                  days.           

 

     proMETHazin      2022- Yes       [id number] 12.5mg      Take 0.5     

      [facility]



     e 25 mg      7-30 08-30                          tablets by           ity o

f



     tablet      00:00: 04:59                          mouth           [place]



               00   :00                           every 4           Medical



                                                  (four)           [facility]anch



                                                  hours as           



                                                  needed for           



                                                  Nausea and           



                                                  Vomiting           



                                                  (N/V) for           



                                                  up to 30           



                                                  days.           

 

     insulin NPH      2022- Yes       [id number] 50U       inject 50      

     [facility]



     and regular      7-30 08-30                          Units           ity of



     human 70-30      00:00: 04:59                          under the           

[place]



     100 unit/mL      00   :00                           skin every           Me

dical



     (70-30)                                         morning           [facility]



     injection                                         and            



                                                  evening           



                                                  for 30           



                                                  days.           

 

     proMETHazin      2022- Yes       [id number] 12.5mg      Insert 1     

      [facility]



     e 12.5 mg      7-30 08-30                          Suppositor           ity

 of



     suppository      00:00: 04:59                          y into           [facility]

[facility]



               00   :00                           rectum           Medical



                                                  every 6           [facility]



                                                  (six)           



                                                  hours as           



                                                  needed for           



                                                  Nausea and           



                                                  Vomiting           



                                                  (N/V) for           



                                                  up to 30           



                                                  days.           

 

     proMETHazin      2022- Yes       [id number] 12.5mg      Take 0.5     

      [facility]



     e 25 mg      7-30 08-30                          tablets by           ity o

f



     tablet      00:00: 04:59                          mouth           [place]



               00   :00                           every 4           Medical



                                                  (four)           Branch



                                                  hours as           



                                                  needed for           



                                                  Nausea and           



                                                  Vomiting           



                                                  (N/V) for           



                                                  up to 30           



                                                  days.           

 

     insulin NPH      2022- Yes       [id number] 50U       inject 50      

     [facility]



     and regular      7-30 08-30                          Units           ity of



     human 70-30      00:00: 04:59                          under the           

[place]



     100 unit/mL      00   :00                           skin every           Me

dical



     (70-30)                                         morning           [facility]



     injection                                         and            



                                                  evening           



                                                  for 30           



                                                  days.           

 

     proMETHazin      2022- Yes       [id number] 12.5mg      Insert 1     

      [facility]



     e 12.5 mg      7-30 08-30                          Suppositor           ity

 of



     suppository      00:00: 04:59                          y into           [facility]

[facility]



               00   :00                           rectum           Medical



                                                  every 6           [facility]



                                                  (six)           



                                                  hours as           



                                                  needed for           



                                                  Nausea and           



                                                  Vomiting           



                                                  (N/V) for           



                                                  up to 30           



                                                  days.           

 

     proMETHazin      2022- Yes       [id number] 12.5mg      Take 0.5     

      [facility]



     e 25 mg      7-30 08-30                          tablets by           ity o

f



     tablet      00:00: 04:59                          mouth           [place]



               00   :00                           every 4           Medical



                                                  (four)           [facility]anch



                                                  hours as           



                                                  needed for           



                                                  Nausea and           



                                                  Vomiting           



                                                  (N/V) for           



                                                  up to 30           



                                                  days.           

 

     insulin NPH      2022- Yes       [id number] 50U       inject 50      

     [facility]



     and regular      7-30 08-30                          Units           ity of



     human 70-30      00:00: 04:59                          under the           

[place]



     100 unit/mL      00   :00                           skin every           Me

dical



     (70-30)                                         morning           [facility]



     injection                                         and            



                                                  evening           



                                                  for 30           



                                                  days.           

 

     proMETHazin      2022- Yes       [id number] 12.5mg      Insert 1     

      [facility]



     e 12.5 mg      7-30 08-30                          Suppositor           ity

 of



     suppository      00:00: 04:59                          y into           [facility]

[facility]



               00   :00                           rectum           Medical



                                                  every 6           [facility]



                                                  (six)           



                                                  hours as           



                                                  needed for           



                                                  Nausea and           



                                                  Vomiting           



                                                  (N/V) for           



                                                  up to 30           



                                                  days.           

 

     proMETHazin      2022- Yes       [id number] 12.5mg      Take 0.5     

      [facility]



     e 25 mg      7-30 08-30                          tablets by           ity o

f



     tablet      00:00: 04:59                          mouth           [place]



               00   :00                           every 4           Medical



                                                  (four)           Branch



                                                  hours as           



                                                  needed for           



                                                  Nausea and           



                                                  Vomiting           



                                                  (N/V) for           



                                                  up to 30           



                                                  days.           

 

     proMETHazin      2022- Yes       [id number] 12.5mg      Insert 1     

      [facility]



     e 12.5 mg      7-30 08-30                          Suppositor           ity

 of



     suppository      00:00: 04:59                          y into           [facility]

[facility]



               00   :00                           rectum           Medical



                                                  every 6           [facility]



                                                  (six)           



                                                  hours as           



                                                  needed for           



                                                  Nausea and           



                                                  Vomiting           



                                                  (N/V) for           



                                                  up to 30           



                                                  days.           

 

     proMETHazin      2022- Yes       [id number] 12.5mg      Take 0.5     

      [facility]



     e 25 mg      7-30 08-30                          tablets by           ity o

f



     tablet      00:00: 04:59                          mouth           [place]



               00   :00                           every 4           Medical



                                                  (four)           Branch



                                                  hours as           



                                                  needed for           



                                                  Nausea and           



                                                  Vomiting           



                                                  (N/V) for           



                                                  up to 30           



                                                  days.           

 

     proMETHazin      2022- Yes       [id number] 12.5mg      Insert 1     

      [facility]



     e 12.5 mg      7-30 08-30                          Suppositor           ity

 of



     suppository      00:00: 04:59                          y into           [facility]

[facility]



               00   :00                           rectum           Medical



                                                  every 6           [facility]



                                                  (six)           



                                                  hours as           



                                                  needed for           



                                                  Nausea and           



                                                  Vomiting           



                                                  (N/V) for           



                                                  up to 30           



                                                  days.           

 

     proMETHazin      2022- Yes       [id number] 12.5mg      Take 0.5     

      [facility]



     e 25 mg      7-30 08-30                          tablets by           ity o

f



     tablet      00:00: 04:59                          mouth           [place]



               00   :00                           every 4           Medical



                                                  (four)           [facility]anch



                                                  hours as           



                                                  needed for           



                                                  Nausea and           



                                                  Vomiting           



                                                  (N/V) for           



                                                  up to 30           



                                                  days.           

 

     proMETHazin      2022- Yes       [id number] 12.5mg      Insert 1     

      [facility]



     e 12.5 mg      7-30 08-30                          Suppositor           ity

 of



     suppository      00:00: 04:59                          y into           [facility]

as



               00   :00                           rectum           Medical



                                                  every 6           [facility]



                                                  (six)           



                                                  hours as           



                                                  needed for           



                                                  Nausea and           



                                                  Vomiting           



                                                  (N/V) for           



                                                  up to 30           



                                                  days.           

 

     proMETHazin      2022- Yes       [id number] 12.5mg      Take 0.5     

      [facility]



     e 25 mg      7-30 08-30                          tablets by           ity o

f



     tablet      00:00: 04:59                          mouth           [place]



               00   :00                           every 4           Medical



                                                  (four)           [facility]anch



                                                  hours as           



                                                  needed for           



                                                  Nausea and           



                                                  Vomiting           



                                                  (N/V) for           



                                                  up to 30           



                                                  days.           

 

     proMETHazin      2022- Yes       [id number] 12.5mg      Insert 1     

      [facility]



     e 12.5 mg      7-30 08-30                          Suppositor           ity

 of



     suppository      00:00: 04:59                          y into           [facility]

as



               00   :00                           rectum           Medical



                                                  every 6           [facility]



                                                  (six)           



                                                  hours as           



                                                  needed for           



                                                  Nausea and           



                                                  Vomiting           



                                                  (N/V) for           



                                                  up to 30           



                                                  days.           

 

     proMETHazin      2022- Yes       [id number] 12.5mg      Take 0.5     

      [facility]



     e 25 mg      7-30 08-30                          tablets by           ity o

f



     tablet      00:00: 04:59                          mouth           [place]



               00   :00                           every 4           Medical



                                                  (four)           Branch



                                                  hours as           



                                                  needed for           



                                                  Nausea and           



                                                  Vomiting           



                                                  (N/V) for           



                                                  up to 30           



                                                  days.           

 

     insulin NPH      2022- No        [id number] 50U       inject 50      

     [facility]



     and regular      7-30 08-12                          Units           ity of



     human 70-30      00:00: 00:00                          under the           

[place]



     100 unit/mL      00   :00                           skin every           Me

dical



     (70-30)                                         morning           [facility]



     injection                                         and            



                                                  evening           



                                                  for 30           



                                                  days.           

 

     insulin      2022- No             5U        5 Units,           Unive

rs



     lispro                                Subcutaneo           ity of



     (human)      17:00: 14:45                          us, TID           [place]



     (HumaLOG      00   :04                           MEALS,           Medical



     U-100)                                         First dose           [facility]



     injection 5                                         (after           



     Units                                         last           



                                                  modificati           



                                                  on) on 22 at           



                                                  1200,           



                                                  Until           



                                                  Discontinu           



                                                  ed,            



                                                  Routine           

 

     HYDROmorphO      2022- Yes            1mg       1 mg,           Univ

ers



     ne                                  Intravenou           ity of



     (DILAUDID)      16:54: 16:53                          s, Q6HPRN,           

[place]



     injection 1      28   :28                           Starting           Medi

sarah



     mg                                           on 22 at           



                                                  1154,           



                                                  Until Sun           



                                                  22 at           



                                                  1153,           



                                                  Routine,           



                                                  [place]           



                                                  gh pain           



                                                  only<br>Us           



                                                  e approved           



                                                  by             



                                                  (Faculty):           



                                                  [facility]            



                                                  PROVIDER           

 

     insulin      2022- No             35U       35 Units,           Univ

ers



     glargine                                Subcutaneo           ity 

of



     (LANTUS      14:00: 14:02                          us, DAILY,           [facility]

as



     U-100)      00   :21                           First dose           Medical



     injection                                         (after           [facility]



     35 Units                                         last           



                                                  modificati           



                                                  on) on 22 at           



                                                  0900,           



                                                  Until           



                                                  Discontinu           



                                                  ed,            



                                                  Routine           

 

     NaCl 0.9%            Yes            10mL      10 mL,           Univer

s



     (NS)                                     Slow IV           ity of



     injection      01:04:                               Push, PRN,           [facility]

[place]



     10 mL      34                                 Starting           Medical



                                                  on Thu           [facility]



                                                  22 at           



                                                  2004,           



                                                  Until           



                                                  Discontinu           



                                                  ed,            



                                                  Routine,           



                                                  line           



                                                  maintenanc           



                                                  e              

 

     lidocaine            Yes            5mL       5 mL,           [facility]



     1% (PF)                                     Subcutaneo           ity of



     (XYLOCAINE)      01:04:                               us, PRN,           [facility]

[place]



     injection 5      34                                 Starting           Medi

[facility]



     mL                                           on Thu           [facility]



                                                  22 at           



                                                  ,           



                                                  Until           



                                                  Discontinu           



                                                  ed,            



                                                  Routine,           



                                                  Local           



                                                  anesthesia           

 

     nystatin            Yes                      Topical,           Unive

rs



     (NYSTOP)                                     BID, First           ity o

f



     powder      01:00:                               dose on           [place]



               00                                 Thu            Medical



                                                  22 at           [facility]



                                                  2000,           



                                                  Until           



                                                  Discontinu           



                                                  ed,            



                                                  Routine           

 

     Sliding            Yes                      Subcutaneo           Univ

ers



     Scale                                     us, TID           ity of



     Insulin -      22:00:                               MEALS+HS,           [facility]

as



     Lispro      00                                 First dose           Medical



     (HumaLOG) +                                         (after           [facility]



     Fsbg                                         last           



     Testing                                         modificati           



                                                  on) on u           



                                                  22 at           



                                                  1700,           



                                                  Until           



                                                  Discontinu           



                                                  ed,            



                                                  Routine           

 

     insulin      2022- No             3U        3 Units,           Unive

rs



     lispro       07-29                          Subcutaneo           ity of



     (human)      22:00: 14:02                          us, TID           [place]



     (HumaLOG      00   :21                           MEALS,           Medical



     U-100)                                         First dose           [facility]



     injection 3                                         on Thu           



     Units                                         22 at           



                                                  1700,           



                                                  Until           



                                                  Discontinu           



                                                  ed,            



                                                  Routine           

 

     mupirocin            Yes                                     [facility]



     (BACTROBAN                                                    ity of



     OINT) 2 %      21:45:                                              [place]



     skin      00                                                Medical



     ointment                                                        [facility]

 

     collagenase            Yes                      Topical           Uni

vers



     (SANTYL)                                     (Apply To           ity of



     ointment      21:45:                               Affected           [place]



               00                                 Areas),           Medical



                                                  DAILY,           [facility]



                                                  First dose           



                                                  (after           



                                                  last           



                                                  modificati           



                                                  on) on u           



                                                  22 at           



                                                  1645,           



                                                  Until           



                                                  Discontinu           



                                                  ed,            



                                                  Routine           

 

     HYDROcodone            Yes            1{tbl}      1 tablet,          

 [facility]



     -acetaminop                                     Oral,           ity of



     hen (NORCO      19:54:                               Q6HPRN,           [place]

[place]



     5) 5-325 mg      07                                 Starting           Medi

[facility]



     tablet 1                                         on u           [facility]



     tablet                                         22 at           



                                                  1454,           



                                                  Until           



                                                  Discontinu           



                                                  ed,            



                                                  Routine,           



                                                  Pain           



                                                  (scale           



                                                  4-6)           

 

     HYDROcodone            Yes            1{tbl}      1 tablet,          

 [facility]



     -acetaminop                                     Oral,           ity of



     hen (NORCO)      19:47:                               Q6HPRN,           [facility]

as



      mg      33                                 Starting           Medica

l



     tablet 1                                         on Thu           [facility]



     tablet                                         22 at           



                                                  1447,           



                                                  Until           



                                                  Discontinu           



                                                  ed,            



                                                  Routine,           



                                                  Pain           



                                                  (scale           



                                                  7-10)           

 

     lactated      2022- No             1000mL      at 100           Univ

ers



     ringers IV       07-30                          mL/hr,           ity of



     infusion      18:30: 19:10                          1,000 mL,           [facility]

as



     1,000 mL      00   :52                           IV             Medical



                                                  Infusion,           [facility]



                                                  CONTINUOUS           



                                                  , Starting           



                                                  on Thu           



                                                  22 at           



                                                  1330,           



                                                  Until Sat           



                                                  22 at           



                                                  1410,           



                                                  Routine           

 

     fluconazole       No             200mg      200 mg,           U

nivers



     (DIFLUCAN)                                Oral,           ity of



     tablet 200      14:00: 19:47                          DAILY,           [place]

[place]



     mg        00   :17                           First dose           Medical



                                                  on Thu           [facility]



                                                  22 at           



                                                  0900,           



                                                  Until           



                                                  Discontinu           



                                                  ed,            



                                                  ASAP<br>Re           



                                                  ason for           



                                                  Anti-Infec           



                                                  tive:           



                                                  Empiric           



                                                  Therapy           



                                                  for            



                                                  Suspected           



                                                  Infection<           



                                                  br>Empiric           



                                                  Therapy           



                                                  Site:           



                                                  Urine<br&g           



                                                  t;Duration           



                                                  of             



                                                  therapy:           



                                                  72 hours           

 

     NaCl 0.45%      2022- No             1000mL      at 150           Un

[facility]



     (1/2NS) IV                                mL/hr,           ity of



     infusion      02:15: 13:47                          1,000 mL,           [facility]

as



     1,000 mL      00   :19                           IV             Medical



                                                  Infusion,           [facility]



                                                  CONTINUOUS           



                                                  , Starting           



                                                  on Wed           



                                                  22 at           



                                                  2115,           



                                                  Until Thu           



                                                  22 at           



                                                  0847,           



                                                  Routine           

 

     proMETHazin       No             12.5mg      12.5 mg,          

 [facility]



     e                                   IV             ity of



     (PHENERGAN)      02:15: 01:35                          [place], [place]



     12.5 mg in      00   :00                           ONCE, 1           Medica

l



     NaCl 0.9%                                         dose, On           [facility]



     (NS) 50 mL                                         Wed            



     IV                                           22 at           



     piggyback                                         211,           



                                                  Routine           

 

     insulin      2022- No             10U       10 Units,           Univ

ers



     lispro                                Subcutaneo           ity of



     (human)      01:30: 00:44                          us, ONCE,           [place]

[place]



     (HumaLOG      00   :00                           1 dose, On           Medic

al



     U-100)                                         Wed            [facility]



     injection                                         22 at           



     10 Units                                         , ASAP           

 

     ertapenem      2022- No             1000mg      1,000 mg,           

[facility]



     (INVANZ)                                Intramuscu           ity 

of



     injection      01:30: 18:10                          lar, Q24H           [facility]

[place]



     1,000 mg      00   :43                           ABX, First           Medic

al



                                                  dose on           [facility]



                                                  Wed            



                                                  22 at           



                                                  2030,           



                                                  Until           



                                                  Discontinu           



                                                  ed,            



                                                  ASAP<br&gt           



                                                  ;Reason           



                                                  for            



                                                  Anti-Infec           



                                                  tive:           



                                                  Empiric           



                                                  Therapy           



                                                  for            



                                                  Suspected           



                                                  Infection<           



                                                  br>Empiric           



                                                  Therapy           



                                                  Site:           



                                                  Urine<br>D           



                                                  uration of           



                                                  therapy:           



                                                  72             



                                                  hours<br>R           



                                                  estricted           



                                                  use            



                                                  approved           



                                                  by:            



                                                  History of           



                                                  ESBL           



                                                  infection           



                                                  in past 3           



                                                  months           

 

     HYDROmorphO      2022- No             1mg       1 mg,           Univ

ers



     ne                                  Intravenou           ity of



     (DILAUDID)      01:01: 19:45                          s, Q4HPRN,           

[place]



     injection 1      21   :38                           Starting           Medi

[facility]



     mg                                           on Wed           [facility]



                                                  22 at           



                                                  ,           



                                                  Until Thu           



                                                  22 at           



                                                  1445,           



                                                  Routine,           



                                                  Pain           



                                                  (scale           



                                                  7-10)<br>U           



                                                  se             



                                                  approved           



                                                  by             



                                                  (Faculty):           



                                                  ADC            



                                                  PROVIDER           

 

     Sliding      2022- No                       Subcutaneo           Uni

vers



     Scale                                us, Q3H,           ity of



     Insulin -      01:00: 18:35                          First dose           [facility]

[place]



     Lispro      00   :13                           (after           Medical



     (HumaLOG) +                                         last           [facility]



     Fsbg                                         modificati           



     Testing                                         on) on 22 at           



                                                  ,           



                                                  Until           



                                                  Discontinu           



                                                  ed,            



                                                  Routine           

 

     pantoprazol            Yes            40mg      40 mg,           Univ

ers



     e                                        Oral,           ity of



     (PROTONIX)      00:30:                               DAILY,           [place]



     EC tablet      00                                 First dose           Medi

[facility]



     40 mg                                         on Wed           [facility]



                                                  22 at           



                                                  1930,           



                                                  Until           



                                                  Discontinu           



                                                  ed,            



                                                  Routine           

 

     FENTanyl PF      2022- No             50ug      50 mcg,           Un

[facility]



     (SUBLIMAZE                                Intramuscu           it

y of



     (PF))      22:51: 01:01                          lar,           [place]



     injection      14   :59                           Q4HPRN,           Medical



     50 mcg                                         Starting           [facility]



                                                  on 22 at           



                                                  1751,           



                                                  Until 22 at           



                                                  ,           



                                                  Routine,           



                                                  Pain           



                                                  (scale           



                                                  7-10)           

 

     Sliding      2022- No                       Subcutaneo           Uni

vers



     Scale                                us, Q3H,           ity of



     Insulin -      22:00: 00:29                          First dose           [facility]

[place]



     Lispro      00   :25                           on Wed           Medical



     (HumaLOG) +                                         22 at           Br

anch



     Fsbg                                         1700,           



     Testing                                         Until           



                                                  Discontinu           



                                                  ed,            



                                                  Routine           

 

     acetaminoph            Yes            1000mg      1,000 mg,          

 [facility]



     en                                       Oral, Q8H,           ity of



     (TYLENOL)      21:15:                               First dose           [facility]

[facility]



     tablet      00                                 on Wed           Medical



     1,000 mg                                         22 at           [facility]

h



                                                  1615,           



                                                  Until           



                                                  Discontinu           



                                                  ed,            



                                                  Routine           

 

     FENTanyl PF       No             50ug      50 mcg,           Un

[facility]



     (SUBLIMAZE                                Slow IV           ity o

f



     (PF))      21:07: 22:51                          Push,           [place]



     injection      07   :27                           Q4HPRN,           Medical



     50 mcg                                         Starting           Branch



                                                  on 22 at           



                                                  1607,           



                                                  Until 22 at           



                                                  1751,           



                                                  Routine,           



                                                  Pain           



                                                  (scale           



                                                  7-10)           

 

     insulin      2022- No             30U       30 Units,           Univ

ers



     glargine                                Subcutaneo           ity 

of



     (LANTUS      19:30: 18:47                          us, DAILY,           Eric

as



     U-100)      00   :44                           First dose           Medical



     injection                                         (after           Branch



     30 Units                                         last           



                                                  modificati           



                                                  on) on 22 at           



                                                  1430,           



                                                  Until           



                                                  Discontinu           



                                                  ed,            



                                                  Routine           

 

     glucagon            Yes            1mg       1 mg,           [facility]



     (GLUCAGEN                                     Intramuscu           ity 

of



     DIAGNOSTIC      19:19:                               lar, PRN,           [facility]

[place]



     KIT)      57                                 Starting           Medical



     injection 1                                         on Wed           [facility]



     mg                                           22 at           



                                                  1419,           



                                                  Until           



                                                  Discontinu           



                                                  ed, ASAP,           



                                                  Blood           



                                                  Glucose           



                                                  &amp;lt;           



                                                  or = 70           



                                                  mg/dL and           



                                                  patient is           



                                                  unable to           



                                                  swallow or           



                                                  has mental           



                                                  changes.           

 

     dextrose 50            Yes            25mL      25 mL,           Un[facility]

ers



     % in water                                     Slow IV           ity of



     (D50W)      19:19:                               Push, PRN,           [place]



     injection      57                                 Starting           Medica

l



     25 mL                                         on Wed           Branch



                                                  22 at           



                                                  1419,           



                                                  Until           



                                                  Discontinu           



                                                  ed, ASAP,           



                                                  Blood           



                                                  Glucose           



                                                  &amp;lt;           



                                                  or = 70           



                                                  mg/dL and           



                                                  patient is           



                                                  unable to           



                                                  swallow or           



                                                  has mental           



                                                  status           



                                                  changes.           

 

     FENTanyl PF       No             25ug      25 mcg,           Un

[facility]



     (SUBLIMAZE                                Slow IV           ity o

f



     (PF))      18:57: 21:09                          Push,           [place]



     injection      03   :29                           Q4HPRN,           Medical



     25 mcg                                         Starting           Branch



                                                  on 22 at           



                                                  1357,           



                                                  Until 22 at           



                                                  1609,           



                                                  Routine,           



                                                  Pain           



                                                  (scale           



                                                  7-10)           

 

     D5W IV      2022- No             1000mL      at 500           [facility]

s



     infusion                                mL/hr, IV           ity o

f



     1,000 mL      18:30: 20:00                          Infusion,           [facility]

as



               00   :00                           ONCE, 1           Medical



                                                  dose, On           Branch



                                                  22 at           



                                                  1330,           



                                                  Routine           

 

     NaCl 0.9%      2022- No             1000mL      at 999           Uni

vers



     (NS) bolus                                mL/hr,           ity of



     infusion      18:15: 20:19                          1,000 mL,           Eric

as



     1,000 mL      00   :00                           IV             Medical



                                                  Infusion,           Branch



                                                  ONCE, 1           



                                                  dose, On           



                                                  22 at           



                                                  1315, STAT           

 

     potassium      2022- No             20meq      20 mEq, IV           

[facility]



     chloride 20                                Piggyback,           i

ty of



     mEq/100 mL      17:45: 21:20                          Q1H, 4           [place]

[facility]



     (KCL) 20      00   :00                           doses,           Medical



     mEq/100 mL                                         First dose           [facility]

[facility]



     RTU IVPB 20                                         on Wed           



     mEq                                          22 at           



                                                  1245, Last           



                                                  dose on           



                                                  22 at           



                                                  1500, 100           



                                                  mL             

 

     KCL       2022- No                       IV             [facility]



     (POTASSIUM                                Infusion,           ity

 of



     CHLORIDE)      17:30: 00:27                          CONTINUOUS           T

[place]



     40 mEq in      00   :47                           , Starting           Medi

[facility]



     NaCl 0.45%                                         on Wed           Branch



     (1/2NS) IV                                         22 at           



     Solution                                         1230,           



                                                  Until 22 at           



                                                  1927,           



                                                  1,000 mL,           



                                                  at 200           



                                                  mL/hr           

 

     KCL       2022- No                       IV             [facility]



     (POTASSIUM                                Infusion,           ity

 of



     CHLORIDE)      15:45: 17:15                          CONTINUOUS           T

[facility]



     40 mEq in      00   :42                           , Starting           Medi

[facility]



     NaCl 0.45%                                         on Wed           Branch



     (1/2NS) IV                                         22 at           



     Solution                                         1045,           



                                                  Until 22 at           



                                                  1215,           



                                                  1,000 mL,           



                                                  at 150           



                                                  mL/hr           

 

     ondansetron            Yes            4mg       4 mg, Slow           

[facility]



     (ZOFRAN                                     IV Push,           ity of



     (PF))      15:38:                               Q6HPRN,           [place]



     injection 4      50                                 Nausea and           Me

dical



     mg                                           Vomiting           Branch



                                                  (N/V),           



                                                  Starting           



                                                  on 22 at           



                                                  1038<br>Do           



                                                  ses of           



                                                  ondansetro           



                                                  n 16 mg           



                                                  and above           



                                                  need to be           



                                                  administer           



                                                  ed via IV           



                                                  piggyback.           



                                                  For Dose           



                                                  >=24mg ECG           



                                                  monitoring           



                                                  is             



                                                  advisable.           



                                                  <br>           

 

     enoxaparin            Yes            40mg      40 mg,           Unive

rs



     (LOVENOX)                                     Subcutaneo           ity 

of



     injection      14:00:                               us, DAILY,           [facility]

[place]



     40 mg      00                                 First dose           Medical



                                                  on Wed           Branch



                                                  22 at           



                                                  0900,           



                                                  Until           



                                                  Discontinu           



                                                  ed,            



                                                  Routine           

 

     fluconazole       No             200mg      at 100           Un

[facility]



     (DIFLUCAN)       07-28                          mL/hr, IV           ity

 of



     Piggyback      13:45: 00:25                          Piggyback,           T

[place]



     200 mg      00   :36                           Q24H ABX,           Medical



                                                  First dose           Branch



                                                  on 22 at           



                                                  0845,           



                                                  Until           



                                                  Discontinu           



                                                  ed,            



                                                  ASAP<br>Do           



                                                  Not            



                                                  Refrigerat           



                                                  e.<br>           

 

     NORepinephr                   .05ug/k      0.05-1.5         

  [facility]



     ine 4 mg in                      g/min      mcg/kg/min           

ity of



     0.9% NaCl      12:21: 22:51                          ?127 kg           [place]

[facility]



     250 mL      22   :36                           (23.8125-7           Medical



     infusion                                         14.375           Branch



     RTU                                          mL/hr,           



                                                  rounded to           



                                                  23..           



                                                  38 mL/hr),           



                                                  IV             



                                                  Infusion,           



                                                  TITRATE,           



                                                  MAP Goal >           



                                                  or = 65           



                                                  mmHg,           



                                                  Starting           



                                                  on 22 at           



                                                  0721<br>In           



                                                  itiate           



                                                  titration           



                                                  at 0.05           



                                                  mcg/kg/min           



                                                  .&nbsp;&nb           



                                                  sp;Increas           



                                                  e by 0.01           



                                                  mcg/kg/min           



                                                  every 30           



                                                  seconds to           



                                                  5 minutes           



                                                  as needed           



                                                  to reach           



                                                  and            



                                                  maintain           



                                                  goal blood           



                                                  pressure.&           



                                                  nbsp;&nbsp           



                                                  ;Maximum           



                                                  dose = 1.5           



                                                  mcg/kg/min           



                                                  .&nbsp;&nb           



                                                  sp;If goal           



                                                  not            



                                                  maintained           



                                                  at maximum           



                                                  allowed           



                                                  dose,           



                                                  contact           



                                                  prescriber           



                                                  .<br>           

 

     potassium                   10meq      10 mEq, IV           

[facility]



     chloride in                                Piggyback,           i

ty of



     water 10      12:00: 14:44                          Q1H, 3           [place]



     mEq/100 mL      00   :00                           doses,           Medical



     RTU 10 mEq                                         First dose           Bra

nch



                                                  on 22 at           



                                                  0700, Last           



                                                  dose on           



                                                  22 at           



                                                  0900,           



                                                  Administer           



                                                  over 60           



                                                  Minutes,           



                                                  100 mL           

 

     aztreonam      2022- No             1000mg      1,000 mg,           

[facility]



     (AZACTAM)                                Slow IV           ity of



     injection      12:00: 12:41                          Push, Q8H           [facility]

[place]



     1,000 mg      00   :55                           ABX, First           Medic

al



                                                  dose on           Branch



                                                  22 at           



                                                  0700,           



                                                  Until           



                                                  Discontinu           



                                                  ed<br>Reas           



                                                  on for           



                                                  Anti-Infec           



                                                  tive:           



                                                  Empiric           



                                                  Therapy           



                                                  for            



                                                  Suspected           



                                                  Infection<           



                                                  br>Empiric           



                                                  Therapy           



                                                  Site:           



                                                  Urine&lt;b           



                                                  r>Duration           



                                                  of             



                                                  therapy: 7           



                                                  days           

 

     NaCl 0.9%      2022- No             1000mL      at 999           Uni

vers



     (NS) IV                                mL/hr, IV           ity of



     infusion      11:45: 11:49                          Infusion,           Eric

as



     1,000 mL      00   :30                           ONCE, 1           Medical



                                                  dose, On           Branch



                                                  22 at           



                                                  0645,           



                                                  Routine           

 

     NaCl 0.45%      2022- No             1000mL      at 250           Un

[facility]



     (1/2NS) IV                                mL/hr,           ity of



     infusion      11:30: 14:41                          1,000 mL,           Eric

as



     1,000 mL      00   :42                           IV             Medical



                                                  Infusion,           Branch



                                                  CONTINUOUS           



                                                  , Starting           



                                                  on 22 at           



                                                  0630,           



                                                  Until 22 at           



                                                  0941, ASAP           

 

     insulin      2022- No             0U/kg/h      0-0.3           Unive

rs



     regular in                                Units/kg/h           it

y of



     0.9 % NaCl      11:19: 19:18                          r ?127 kg           T

[facility]



     (MYXREDLIN)      08   :18                           (0-38.1           Medic

al



     100                                          mL/hr), IV           Branch



     unit/100 mL                                         Infusion,           



     (1 unit/mL)                                         TITRATE,           



     RTU IV                                         Parameters           



     infusion                                         in Admin.           



                                                  Instr.,           



                                                  Follow [facility]           



                                                  Insulin           



                                                  Rate           



                                                  Adjustment           



                                                  Protocol,           



                                                  Starting           



                                                  on 22 at           



                                                  0619<br>-           



                                                  Follow           



                                                  '[facility]           



                                                  Insulin           



                                                  Rate           



                                                  Adjustment           



                                                  Protocol'           



                                                  &nbsp;-           



                                                  Start           



                                                  insulin           



                                                  infusion           



                                                  at 0.1           



                                                  units/kg           



                                                  /hr. When           



                                                  adjusting           



                                                  rate, do           



                                                  not            



                                                  increase           



                                                  rate above           



                                                  0.3            



                                                  units/kg/h           



                                                  our.&nbsp;           



                                                  - Hold           



                                                  insulin           



                                                  and NHO           



                                                  STAT if           



                                                  potassium           



                                                  is less           



                                                  than 3.3           



                                                  mEq/L.&nbs           



                                                  p;- NHO           



                                                  STAT if           



                                                  blood           



                                                  glucose           



                                                  less than           



                                                  100 mg/dL           



                                                  or greater           



                                                  than 600           



                                                  mg/dL or           



                                                  if blood           



                                                  glucose           



                                                  not            



                                                  decreased           



                                                  by 50           



                                                  mg/dL in           



                                                  the first           



                                                  hour of           



                                                  insulin           



                                                  infusion.&           



                                                  nbsp;-           



                                                  Once blood           



                                                  glucose is           



                                                  less than           



                                                  or equal           



                                                  to 200           



                                                  mg/dL in           



                                                  patient           



                                                  with DKA           



                                                  or blood           



                                                  glucose is           



                                                  less than           



                                                  or equal           



                                                  to 300           



                                                  mg/dL in           



                                                  patient           



                                                  with [facility],           



                                                  change to           



                                                  fluids           



                                                  with           



                                                  dextrose.&           



                                                  nbsp;&amp;           



                                                  nbsp;&nbsp           



                                                  ;- If           



                                                  during           



                                                  insulin           



                                                  infusion           



                                                  and on           



                                                  dextrose           



                                                  fluids           



                                                  blood           



                                                  glucose is           



                                                  less than           



                                                  150 mg/dL           



                                                  in DKA or           



                                                  less than           



                                                  200 mg/dL           



                                                  in [facility],           



                                                  [facility] for           



                                                  increase           



                                                  in             



                                                  dextrose           



                                                  provided           



                                                  in             



                                                  continuous           



                                                  fluids.&nb           



                                                  sp;- Once           



                                                  AGAP less           



                                                  than 12,           



                                                  blood           



                                                  glucose           



                                                  less than           



                                                  200 mg/dL,           



                                                  TCO2           



                                                  greater           



                                                  than 15 x           



                                                  2 and           



                                                  patient           



                                                  ready to           



                                                  eat, [facility]           



                                                  for SubQ           



                                                  glargine           



                                                  order two           



                                                  hours           



                                                  before           



                                                  stopping           



                                                  insulin           



                                                  infusion.<           



                                                  br>            

 

     NaCl 0.9%      2022- No             1000mL      at 999           Uni

vers



     (NS) IV                                mL/hr, IV           ity of



     infusion      08:30: 11:00                          Infusion,           [facility]

as



     1,000 mL      00   :00                           ONCE, 1           Medical



                                                  dose, On           [facility]



                                                  22 at           



                                                  0330,           



                                                  Routine           

 

     NaCl 0.45%      2022- No             1000mL      at 250           Un

[facility]



     (1/2NS) IV                                mL/hr,           ity of



     infusion      06:00: 11:20                          1,000 mL,           [facility]

as



     1,000 mL      00   :23                           IV             Medical



                                                  Infusion,           [facility]



                                                  CONTINUOUS           



                                                  , Starting           



                                                  on 22 at           



                                                  0100,           



                                                  Until 22 at           



                                                  0620, ASAP           

 

     FENTanyl PF      2022- No             50ug      50 mcg,           Un

[facility]



     (SUBLIMAZE                                Slow IV           ity o

f



     (PF))      04:15: 03:07                          Push,           [place]



     injection      00   :00                           ONCE, 1           Medical



     50 mcg                                         dose, On           [facility]



                                                  Tu22 at           



                                                  2315,           



                                                  Routine           

 

     cefTRIAXone      2022- No             1000mg      1,000 mg,         

  [facility]



     (ROCEPHIN)                                Intravenou           it

y of



     1,000 mg in      04:00: 06:06                          s, ONCE, 1          

 [place]



     NaCl 0.9%      00   :00                           dose, On           Medica

l



     (NS) 50 mL                                         Tue            [facility]



     MINI-BAG                                         22 at           



                                                  2300,           



                                                  Administer           



                                                  over 30           



                                                  Minutes,           



                                                  50             



                                                  mL<br&gt;R           



                                                  elmira for           



                                                  Anti-Infec           



                                                  tive:           



                                                  Documented           



                                                  Infection<           



                                                  br>Documen           



                                                  huma            



                                                  Infection           



                                                  Site:           



                                                  Urine<br&g           



                                                  t;Duration           



                                                  of             



                                                  Therapy:           



                                                  Other (see           



                                                  Comments)           

 

     NaCl 0.9%      2022- No             1000mL      at 999           Uni

vers



     (NS) bolus                                mL/hr,           ity of



     infusion      03:00: 04:14                          1,000 mL,           [facility]

as



     1,000 mL      00   :00                           IV             Medical



                                                  Infusion,           [facility]



                                                  ONCE, 1           



                                                  dose, On           



                                                  Tue            



                                                  22 at           



                                                  2200, STAT           

 

     insulin      2022- No             10U       10 Units,           Univ

ers



     regular                                Subcutaneo           ity o

f



     human      01:30: 00:37                          us, ONCE,           [place]



     (HUMULIN R)      00   :00                           1 dose, On           Me

dical



     injection                                         Tue            [facility]



     10 Units                                         22 at           



                                                  2030,           



                                                  Routine           

 

     FENTanyl PF      2022- No             50ug      50 mcg,           Un

[facility]



     (SUBLIMAZE                                Slow IV           ity o

f



     (PF))      01:30: 00:28                          Push,           [place]



     injection      00   :00                           ONCE, 1           Medical



     50 mcg                                         dose, On           [facility]



                                                  e            



                                                  22 at           



                                                  2030,           



                                                  Routine           

 

     NaCl 0.9%      2022- No             1000mL      at 999           Uni

vers



     (NS) bolus                                mL/hr,           ity of



     infusion      00:30: 02:09                          1,000 mL,           [facility]

as



     1,000 mL      00   :00                           IV             Medical



                                                  Infusion,           [facility]



                                                  ONCE, 1           



                                                  dose, On           



                                                  e            



                                                  22 at           



                                                  1930, STAT           

 

     iopamidol      2022- No        [id number] 65mL      65 mL,         

  [facility]



     (ISOVUE                 [id number]           Intravenou           ity

 of



     370-500 mL)      02:29: 02:45                          s, ONCE, 1          

 [place]



     injection      00   :00                           dose, On           Medica

l



     65 mL                                         Fri            [facility]



                                                  7/15/22 at           



                                                  2145,           



                                                  Routine           

 

     FENTanyl PF      2022- No             150ug      150 mcg,           

[facility]



     (SUBLIMAZE                                Slow IV           ity o

f



     (PF))      01:32: 01:44                          Push,           [place]



     injection      00   :00                           ONCE, 1           Medical



     150 mcg                                         dose, On           [facility]



                                                  Fri            



                                                  7/15/22 at           



                                                  ,           



                                                  Routine           

 

     FENTanyl PF      2022- No             50ug      50 mcg,           Un

[facility]



     (SUBLIMAZE      7-16 07-15                          Slow IV           ity o

f



     (PF))      00:30: 23:32                          Push,           [place]



     injection      00   :00                           ONCE, 1           Medical



     50 mcg                                         dose, On           [facility]



                                                  Fri            



                                                  7/15/22 at           



                                                  1930,           



                                                  Routine           

 

     insulin      2022- No             10U       10 Units,           Univ

ers



     regular      7-16 07-15                          Subcutaneo           ity o

f



     human      00:30: 23:28                          , ONCE,           [place]



     (HUMULIN R)      00   :00                           1 dose, On           Me

dical



     injection                                         Fri            [facility]



     10 Units                                         7/15/22 at           



                                                  193,           



                                                  Routine           

 

     clindamycin      2022- Yes       [id number] 600mg      Take 2      

     [facility]



     300 mg      7-15 07-23           [id number]           capsules           ity of



     capsule      00:00: 04:59                          by mouth 4           [facility]

as



               00   :00                           (four)           Medical



                                                  times           [place]



                                                  daily for           



                                                  7 days.           

 

     clindamycin      2022- No        [id number] 600mg      Take 2      

     [facility]



     300 mg      7-15 07-23           [id number]           capsules           ity of



     capsule      00:00: 04:59                          by mouth 4           [facility]

as



               00   :00                           (four)           Medical



                                                  times           [place]



                                                  daily for           



                                                  7 days.           

 

     insulin NPH      2022- No             8U        8 Units,           U

nivers



     and regular      -06                          Subcutaneo           i

ty of



     human 70-30      12:30: 11:30                          us, ONCE,           

[place]



     (70-30      00   :00                           1 dose, On           Medical



     U-100                                         Wed 22           [facility]



     INSULIN)                                         at 0730,           



     100 unit/mL                                         ASAP           



     (70-30)                                                        



     injection 8                                                        



     Units                                                        

 

     insulin            Yes       [id number] 20U       inject 20           U

nivers



     glargine      7-02                               Units           ity of



     100 unit/mL      00:00:                               under the           [facility]

[facility]



     injection      00                                 skin           Medical



                                                  daily.           [facility]

 

     insulin            Yes       [id number] 20U       inject 20           U

nivers



     glargine      7-02                               Units           ity of



     100 unit/mL      00:00:                               under the           T

[facility]



     injection      00                                 skin           Medical



                                                  daily.           [facility]

 

     insulin            Yes       [id number] 20U       inject 20           U

nivers



     glargine      7-02                               Units           ity of



     100 unit/mL      00:00:                               under the           T

[facility]



     injection      00                                 skin           Medical



                                                  daily.           [facility]

 

     insulin            Yes       [id number] 20U       inject 20           U

nivers



     glargine      7-02                               Units           ity of



     100 unit/mL      00:00:                               under the           T

[facility]



     injection      00                                 skin           Medical



                                                  daily.           [facility]

 

     insulin      2022- No        [id number] 20U       inject 20           

[facility]



     glargine       07-30                          Units           ity of



     100 unit/mL      00:00: 00:00                          under the           

[place]



     injection      00   :00                           skin           Medical



                                                  daily.           [facility]

 

     insulin      2022- No        [id number] 20U       inject 20           

[facility]



     glargine       07-30                          Units           ity of



     100 unit/mL      00:00: 00:00                          under the           

[place]



     injection      00   :00                           skin           Medical



                                                  daily.           [facility]

 

     insulin            Yes            20U       20 Units,           Unive

rs



     glargine                                     Subcutaneo           ity o

f



     (LANTUS      14:00:                               us, DAILY,           [place]

[facility]



     U-100)      00                                 First dose           Medical



     injection                                         (after           [facility]



     20 Units                                         last           



                                                  modificati           



                                                  on) on 22 at           



                                                  0900,           



                                                  Until           



                                                  Discontinu           



                                                  ed,            



                                                  Routine           

 

     insulin      2022- No        [id number] 26U       inject 26           

[facility]



     lispro,                                Units           ity of



     human, 100      00:00: 04:59                          under the           T

[facility]



     unit/mL      00   :00                           skin 3           Medical



     injection                                         (three)           [facility]



                                                  times           



                                                  daily           



                                                  before           



                                                  meals for           



                                                  30 days.           

 

     insulin      2022- No        [id number] 26U       inject 26           

[facility]



     lispro,      -                          Units           ity of



     human, 100      00:00: 04:59                          under the           T

[facility]



     unit/mL      00   :00                           skin 3           Medical



     injection                                         (three)           [facility]



                                                  times           



                                                  daily           



                                                  before           



                                                  meals for           



                                                  30 days.           

 

     insulin      2022- No        [id number] 26U       inject 26           

[facility]



     lispro,                                Units           ity of



     human, 100      00:00: 04:59                          under the           T

[facility]



     unit/mL      00   :00                           skin 3           Medical



     injection                                         (three)           [facility]



                                                  times           



                                                  daily           



                                                  before           



                                                  meals for           



                                                  30 days.           

 

     insulin      2022- No        [id number] 26U       inject 26           

[facility]



     lispro,       08-01                          Units           ity of



     human, 100      00:00: 04:59                          under the           [facility]

[facility]



     unit/mL      00   :00                           skin 3           Medical



     injection                                         (three)           [facility]



                                                  times           



                                                  daily           



                                                  before           



                                                  meals for           



                                                  30 days.           

 

     insulin      2022- No        [id number] 26U       inject 26           

[facility]



     lispro,       07-30                          Units           ity of



     human, 100      00:00: 00:00                          under the           T

[facility]



     unit/mL      00   :00                           skin 3           Medical



     injection                                         (three)           [facility]



                                                  times           



                                                  daily           



                                                  before           



                                                  meals for           



                                                  30 days.           

 

     insulin      2022- No        [id number] 26U       inject 26           

[facility]



     lispro,       07-30                          Units           ity of



     human, 100      00:00: 00:00                          under the           T

[place]



     unit/mL      00   :00                           skin 3           Medical



     injection                                         (three)           [facility]



                                                  times           



                                                  daily           



                                                  before           



                                                  meals for           



                                                  30 days.           

 

     insulin            Yes            26U       26 Units,           Unive

rs



     lispro      -30                               Subcutaneo           ity of



     (human)      22:15:                               us, TIDAC,           [place]

[place]



     (HumaLOG      00                                 First dose           Medic

al



     U-100)                                         (after           Branch



     injection                                         last           



     26 Units                                         modificati           



                                                  on) on Thu           



                                                  22 at           



                                                  1715,           



                                                  Until           



                                                  Discontinu           



                                                  ed,            



                                                  Routine           

 

     insulin      2022- No             22U       22 Units,           Univ

ers



     lispro      --30                          Subcutaneo           ity of



     (human)      16:30: 21:36                          us, TIDAC,           [facility]

as



     (HumaLOG      00   :36                           First dose           Medic

al



     U-100)                                         (after           Br[facility]



     injection                                         last           



     22 Units                                         modificati           



                                                  on) on Thu           



                                                  22 at           



                                                  1130,           



                                                  Until           



                                                  Discontinu           



                                                  ed,            



                                                  Routine           

 

     insulin      2022- No             10U       10 Units,           Univ

ers



     glargine      --30                          Subcutaneo           ity 

of



     (LANTUS      14:00: 17:40                          us, DAILY,           [facility]

as



     U-100)      00   :01                           First dose           Medical



     injection                                         on Thu           [facility]



     10 Units                                         22 at           



                                                  0900,           



                                                  Until           



                                                  Discontinu           



                                                  ed,            



                                                  Routine           

 

     insulin            Yes            52U       52 Units,           Unive

rs



     glargine      6-30                               Subcutaneo           ity o

f



     (LANTUS      02:00:                               us, QHS,           [place]



     U-100)      00                                 First dose           Medical



     injection                                         on Wed           [facility]



     52 Units                                         22 at           



                                                  2100,           



                                                  Until           



                                                  Discontinu           



                                                  ed,            



                                                  Routine           

 

     enoxaparin            Yes            40mg      40 mg,           Unive

rs



     (LOVENOX)                                     Subcutaneo           ity 

of



     injection      22:00:                               us, DAILY,           [place]

[place]



     40 mg      00                                 First dose           Medical



                                                  on Wed           [facility]



                                                  22 at           



                                                  1700,           



                                                  Until           



                                                  Discontinu           



                                                  ed,            



                                                  Routine           

 

     Sliding            Yes                      Subcutaneo           Univ

ers



     Scale                                     us, TID           ity of



     Insulin -      17:00:                               MEALS+HS,           [facility]

as



     Lispro      00                                 First dose           Medical



     (HumaLOG) +                                         (after           [facility]anch



     Fsbg                                         last           



     Testing                                         modificati           



                                                  on) on 22 at           



                                                  1200,           



                                                  Until           



                                                  Discontinu           



                                                  ed,            



                                                  Routine           

 

     insulin       No             18U       18 Units,           Univ

ers



     lispro                                Subcutaneo           ity of



     (human)      16:30: 14:46                          us, TIDAC,           [facility]

as



     (HumaLOG      00   :32                           First dose           Medic

al



     U-100)                                         (after           Branch



     injection                                         last           



     18 Units                                         modificati           



                                                  on) on 22 at           



                                                  1130,           



                                                  Until           



                                                  Discontinu           



                                                  ed,            



                                                  Routine           

 

     amLODIPine            Yes            10mg      10 mg,           Unive

rs



     (NORVASC)                                     Oral,           ity of



     tablet 10      14:00:                               DAILY,           [place]



     mg        00                                 First dose           Medical



                                                  on Wed           Branch



                                                  22 at           



                                                  0900,           



                                                  Until           



                                                  Discontinu           



                                                  ed,            



                                                  Routine           

 

     cefTRIAXone            Yes            1000mg      1,000 mg,          

 [facility]



     (ROCEPHIN)                                     IV             ity of



     1,000 mg in      13:00:                               Piggyback,           

[place]



     NaCl 0.9%      00                                 Q24H ABX,           Medic

al



     (NS) 50 mL                                         First dose           Bra

[facility]



     MINI-BAG                                         on 22 at           



                                                  0800,           



                                                  Until           



                                                  Discontinu           



                                                  ed,            



                                                  Administer           



                                                  over 30           



                                                  Minutes,           



                                                  50             



                                                  mL<br>Reas           



                                                  on for           



                                                  Anti-Infec           



                                                  tive:           



                                                  Documented           



                                                  Infection<           



                                                  br>Documen           



                                                  huma            



                                                  Infection           



                                                  Site:           



                                                  Urine<br>D           



                                                  uration of           



                                                  Therapy: 7           



                                                  days           

 

     Sliding       No                       Subcutaneo           Uni

vers



     Scale                                us, TID           ity of



     Insulin -      13:00: 15:19                          MEALS+HS,           [facility]

[place]



     Lispro      00   :51                           First dose           Medical



     (HumaLOG) +                                         on Wed           Branch



     Fsbg                                         22 at           



     Testing                                         0800,           



                                                  Until           



                                                  Discontinu           



                                                  ed,            



                                                  Routine           

 

     insulin      2022- No             15U       15 Units,           Univ

ers



     lispro                                Subcutaneo           ity of



     (human)      12:30: 15:19                          us, TIDAC,           [facility]

as



     (HumaLOG      00   :51                           First dose           Medic

al



     U-100)                                         on Wed           Branch



     injection                                         22 at           



     15 Units                                         0730,           



                                                  Until           



                                                  Discontinu           



                                                  ed,            



                                                  Routine           

 

     HYDROmorpho      2022- No             1mg       1 mg, Slow          

 [facility]



     ne         07                          IV Push,           ity of



     (DILAUDID)      11:32: 11:31                          Q6HPRN,           [facility]

as



     injection 1      00   :00                           Starting           Medi

sar[place]



     mg                                           on Wed           Branch



                                                  22 at           



                                                  0632,           



                                                  Until 22 at           



                                                  0631,           



                                                  Routine,           



                                                  Pain           



                                                  (scale           



                                                  7-10)<br>U           



                                                  se             



                                                  approved           



                                                  by             



                                                  (Faculty):           



                                                  [facility]            



                                                  PROVIDER           

 

     NaCl 0.9%      2022- No             1000mL      at 999           Uni

vers



     (NS) bolus                                mL/hr,           ity of



     infusion      11:00: 11:04                          1,000 mL,           Eric

as



     1,000 mL      00   :00                           IV             Medical



                                                  Infusion,           Branch



                                                  ONCE, 1           



                                                  dose, On           



                                                  22 at           



                                                  0600, STAT           

 

     HYDROcodone            Yes            1{tbl}      1 tablet,          

 [facility]



     -acetaminop                                     Oral,           ity of



     hen (NORCO      10:54:                               Q6HPRN,           [place]

[place]



     5) 5-325 mg      27                                 Starting           Medi

sarah



     tablet 1                                         on Wed           Branch



     tablet                                         22 at           



                                                  0554,           



                                                  Until           



                                                  Discontinu           



                                                  ed,            



                                                  Routine,           



                                                  Pain           



                                                  (scale           



                                                  4-6)           

 

     dextrose            Yes            250mL      250 mL, IV           Un

[facility]



     10% (D10W)                                     Infusion,           ity 

of



     bolus      10:53:                               PRN - SEE           [place]



     infusion      43                                 INSTRUCTIO           Medic

al



     250 mL                                         NS,            Branch



                                                  Administer           



                                                  over 60           



                                                  Minutes,           



                                                  hypoglycem           



                                                  ia,            



                                                  Starting           



                                                  on 22 at           



                                                  0553<br>De           



                                                  xtrose 10%           



                                                  250 mL bag           



                                                  contains:&           



                                                  nbsp;10 gm           



                                                  = 100           



                                                  mL&nbsp;20           



                                                  gm = 200           



                                                  mL&nbsp;25           



                                                  gm = 250           



                                                  mL (whole           



                                                  bag)&nbsp;           



                                                  &nbsp;The           



                                                  maximum           



                                                  rate at           



                                                  which           



                                                  dextrose           



                                                  can be           



                                                  infused           



                                                  without           



                                                  producing           



                                                  glycosuria           



                                                  is 0.5           



                                                  g/kg/hour.           



                                                  &nbsp;&nbs           



                                                  p;BUD: If           



                                                  wrapper is           



                                                  open bag           



                                                  is good           



                                                  for 30           



                                                  days at           



                                                  room           



                                                  temperatur           



                                                  e.&nbsp;<b           



                                                  r>             

 

     glucagon            Yes            1mg       1 mg,           [facility]



     (GLUCAGEN                                     Intramuscu           ity 

of



     DIAGNOSTIC      10:53:                               lar, PRN,           Te

[place]



     KIT)      40                                 Starting           Medical



     injection 1                                         on Wed           Branch



     mg                                           22 at           



                                                  0553,           



                                                  Until           



                                                  Discontinu           



                                                  ed, ASAP,           



                                                  Blood           



                                                  Glucose           



                                                  &amp;lt;           



                                                  or = 70           



                                                  mg/dL and           



                                                  patient is           



                                                  unable to           



                                                  swallow or           



                                                  has mental           



                                                  changes.           

 

     acetaminoph            Yes            650mg      650 mg,           Un

[place]



     en                                       Oral,           ity of



     (TYLENOL)      10:52:                               Q6HPRN,           [place]



     tablet 650      33                                 Starting           Medic

al



     mg                                           on Wed           Branch



                                                  22 at           



                                                  0552,           



                                                  Until           



                                                  Discontinu           



                                                  ed,            



                                                  Routine,           



                                                  Pain           



                                                  (scale           



                                                  1-3)           

 

     NaCl 0.9%      2022- No             1000mL      at 999           Uni

vers



     (NS) bolus                                mL/hr,           ity of



     infusion      09:00: 08:10                          1,000 mL,           Eric

as



     1,000 mL      00   :00                           IV             Medical



                                                  Infusion,           Branch



                                                  ONCE, 1           



                                                  dose, On           



                                                  22 at           



                                                  0400, STAT           

 

     FENTanyl PF      2022- No             50ug      50 mcg,           Un

[place]



     (SUBLIMAZE                                Slow IV           ity o

f



     (PF))      08:22: 08:27                          Push,           [place]



     injection      00   :00                           ONCE, 1           Medical



     50 mcg                                         dose, On           Branch



                                                  Wed            



                                                  22 at           



                                                  0330,           



                                                  Routine           

 

     insulin      2022- No             10U       10 Units,           Univ

ers



     regular                                Slow IV           ity of



     human      07:00: 05:53                          Push,           [place]



     (HUMULIN R)      00   :00                           ONCE, 1           Medic

al



     injection                                         dose, On           Branch



     10 Units                                         Wed            



                                                  22 at           



                                                  0200, STAT           

 

     FENTanyl PF      2022- No             50ug      50 mcg,           Un

[place]



     (SUBLIMAZE                                Slow IV           ity o

f



     (PF))      04:15: 04:04                          Push,           [place]



     injection      00   :00                           ONCE, 1           Medical



     50 mcg                                         dose, On           Branch



                                                  22 at           



                                                  2315, STAT           

 

     NaCl 0.9%       No             1000mL      at 999           Uni

vers



     (NS) bolus                                mL/hr,           ity of



     infusion      04:00: 05:53                          1,000 mL,           Eric

as



     1,000 mL      00   :00                           IV             Medical



                                                  Infusion,           Branch



                                                  ONCE, 1           



                                                  dose, On           



                                                  22 at           



                                                  2300, STAT           

 

     insulin       No             10U       10 Units,           Univ

ers



     regular                                Slow IV           ity of



     human      04:00: 03:48                          Push,           [place]



     (HUMULIN R)      00   :00                           ONCE, 1           Medic

al



     injection                                         dose, On           [facility]



     10 Units                                         22 at           



                                                  2300, STAT           

 

     amLODIPine      2022- No        [id number] 10mg      Take 1           

[facility]



     10 mg      6-26 07-30                          tablet by           ity of



     tablet      00:00: 00:00                          mouth           [place]



               00   :00                           daily for           Medical



                                                  30 days.           [place]

 

     amLODIPine      2022- No        [id number] 10mg      Take 1           

[facility]



     10 mg      6-26 07-30                          tablet by           ity of



     tablet      00:00: 00:00                          mouth           [place]



               00   :00                           daily for           Medical



                                                  30 days.           [place]

 

     amLODIPine       No        [id number] 10mg      Take 1           

[facility]



     10 mg      6-26 07-27                          tablet by           ity of



     tablet      00:00: 04:59                          mouth           [place]



               00   :00                           daily for           Medical



                                                  30 days.           [place]

 

     amLODIPine      2022- No        [id number] 10mg      Take 1           

[facility]



     10 mg      6-26 07-27                          tablet by           ity of



     tablet      00:00: 04:59                          mouth           [place]



               00   :00                           daily for           Medical



                                                  30 days.           [facility]

 

     amLODIPine      2022- No        [id number] 10mg      Take 1           

[facility]



     10 mg      6-26 07-27                          tablet by           ity of



     tablet      00:00: 04:59                          mouth           [place]



               00   :00                           daily for           Medical



                                                  30 days.           [facility]

 

     amLODIPine      -2022- No        [id number] 10mg      Take 1           

[facility]



     10 mg      6-26 07-27                          tablet by           ity of



     tablet      00:00: 04:59                          mouth           [place]



               00   :00                           daily for           Medical



                                                  30 days.           [facility]

 

     amLODIPine      2022- No        [id number] 10mg      Take 1           

[facility]



     10 mg      --27                          tablet by           ity of



     tablet      00:00: 04:59                          mouth           [place]



               00   :00                           daily for           Medical



                                                  30 days.           [facility]

 

     amLODIPine      2022- No        [id number] 10mg      Take 1           

[facility]



     10 mg      6--27                          tablet by           ity of



     tablet      00:00: 04:59                          mouth           [place]



               00   :00                           daily for           Medical



                                                  30 days.           [facility]

 

     amLODIPine      2022- No        [id number] 10mg      Take 1           

[facility]



     10 mg      6--27                          tablet by           ity of



     tablet      00:00: 04:59                          mouth           [place]



               00   :00                           daily for           Medical



                                                  30 days.           [facility]

 

     HYDROmorpho       No             .5mg      0.5 mg,           Un

[place]



     ne        -                          Slow IV           ity of



     (DILAUDID)      18:00: 17:11                          Push,           [place]



     injection      00   :00                           ONCE, 1           Medical



     0.5 mg                                         dose, On           [facility]



                                                  Sat            



                                                  22 at           



                                                  1300,           



                                                  Routine<br           



                                                  >Use           



                                                  approved           



                                                  by             



                                                  (Faculty):           



                                                  ADC            



                                                  PROVIDER           

 

     cefTRIAXone            Yes            1000mg      1,000 mg,          

 [facility]



     (ROCEPHIN)                                     IV             ity of



     1,000 mg in      15:00:                               Piggyback,           

[place]



     NaCl 0.9%      00                                 Q24H ABX,           Medic

al



     (NS) 50 mL                                         First dose           [facility]

[facility]



     MINI-BAG                                         on Sat           



                                                  22 at           



                                                  1000,           



                                                  Until           



                                                  Discontinu           



                                                  ed,            



                                                  Administer           



                                                  over 30           



                                                  Minutes,           



                                                  50             



                                                  mL<br>Reas           



                                                  on for           



                                                  Anti-Infec           



                                                  tive:           



                                                  Documented           



                                                  Infection<           



                                                  br>Documen           



                                                  huma            



                                                  Infection           



                                                  Site:           



                                                  Urine<br>D           



                                                  uration of           



                                                  Therapy: 7           



                                                  days           

 

     fluconazole            Yes            200mg      200 mg,           Un

[place]



     (DIFLUCAN)                                     Oral,           ity of



     tablet 200      14:00:                               DAILY,           [place]



     mg        00                                 First dose           Medical



                                                  on [facility]



                                                  22 at           



                                                  0900,           



                                                  Until           



                                                  Discontinu           



                                                  ed,            



                                                  ASAP<br>Re           



                                                  ason for           



                                                  Anti-Infec           



                                                  tive:           



                                                  Documented           



                                                  Infection<           



                                                  br>Documen           



                                                  huma            



                                                  Infection           



                                                  Site:           



                                                  Urine<br>D           



                                                  uration of           



                                                  Therapy: 7           



                                                  days           

 

     Sliding            Yes                      Subcutaneo           Univ

ers



     Scale      6-25                               us, TID           ity of



     Insulin -      13:00:                               MEALS+HS,           [facility]

as



     Lispro      00                                 First dose           Medical



     (HumaLOG) +                                         on Sat           [facility]



     Fsbg                                         22 at           



     Testing                                         0800,           



                                                  Until           



                                                  Discontinu           



                                                  ed,            



                                                  Routine           

 

     hydromorpho       No             4ug       Take 4 mcg          

 [facility]



     ne HCl                                by mouth           ity of



     (HYDROMORPH      11:55: 00:00                          every 12           [facility]

[place]



     ONE ORAL)      19   :00                           (twelve)           Medica

l



                                                  hours.           [facility]

 

     insulin      2022- No             10U       10 Units,           Univ

ers



     lispro                                Subcutaneo           ity of



     (human)      07:00: 06:53                          us, ONCE,           [place]

[place]



     (HumaLOG      00   :00                           1 dose, On           Medic

al



     U-100)                                         Sat            [facility]



     injection                                         22 at           



     10 Units                                         0200,           



                                                  Routine           

 

     glucagon            Yes            1mg       1 mg,           [facility]



     (GLUCAGEN                                     Intramuscu           ity 

of



     DIAGNOSTIC      05:50:                               lar, PRN,           [place]

[place]



     KIT)      51                                 Starting           Medical



     injection 1                                         on Sat           [facility]



     mg                                           22 at           



                                                  0050,           



                                                  Until           



                                                  Discontinu           



                                                  ed, ASAP,           



                                                  Blood           



                                                  Glucose           



                                                  &amp;lt;           



                                                  or = 70           



                                                  mg/dL and           



                                                  patient is           



                                                  unable to           



                                                  swallow or           



                                                  has mental           



                                                  changes.           

 

     dextrose            Yes            250mL      250 mL, IV           Un

[place]



     10% (D10W)                                     Infusion,           ity 

of



     bolus      05:50:                               PRN - SEE           [place]



     infusion      51                                 INSTRUCTIO           Medic

al



     250 mL                                         NS,            [facility]



                                                  Administer           



                                                  over 60           



                                                  Minutes,           



                                                  BS < 70,           



                                                  Starting           



                                                  on Sat           



                                                  22 at           



                                                  0050<br>De           



                                                  xtrose 10%           



                                                  250 mL bag           



                                                  contains:&           



                                                  nbsp;10 gm           



                                                  = 100           



                                                  mL&nbsp;20           



                                                  gm = 200           



                                                  mL&nbsp;25           



                                                  gm = 250           



                                                  mL (whole           



                                                  bag)&nbsp;           



                                                  &nbsp;The           



                                                  maximum           



                                                  rate at           



                                                  which           



                                                  dextrose           



                                                  can be           



                                                  infused           



                                                  without           



                                                  producing           



                                                  glycosuria           



                                                  is 0.5           



                                                  g/kg/hour.           



                                                  &nbsp;&nbs           



                                                  p;BUD: If           



                                                  wrapper is           



                                                  open bag           



                                                  is good           



                                                  for 30           



                                                  days at           



                                                  room           



                                                  temperatur           



                                                  e.&nbsp;<b           



                                                  r>             

 

     insulin            Yes            52U       52 Units,           Unive

rs



     glargine                                     Subcutaneo           ity o

f



     (LANTUS      02:00:                               us, [facility],           [place]



     U-100)      00                                 First dose           Medical



     injection                                         on Fri           [facility]



     52 Units                                         22 at           



                                                  2100,           



                                                  Until           



                                                  Discontinu           



                                                  ed,            



                                                  Routine           

 

     Sliding      2022- No                       Subcutaneo           Uni

vers



     Scale      -                          us, TID           ity of



     Insulin -      02:00: 05:50                          MEALS+HS,           [place]

[place]



     Lispro      00   :40                           First dose           Medical



     (HumaLOG) +                                         on Fri           [facility]



     Fsbg                                         22 at           



     Testing                                         2100,           



                                                  Until           



                                                  Discontinu           



                                                  ed,            



                                                  Routine           

 

     ciprofloxac      2022- No        [id number] 500mg      Take 1         

  [facility]



     in HCl 500      -                          tablet by           ity

 of



     mg tablet      00:00: 04:59                          mouth           [place]



               00   :00                           every 12           Medical



                                                  (twelve)           [facility]



                                                  hours for           



                                                  10 days.           

 

     ciprofloxac      2022- No        [id number] 500mg      Take 1         

  [facility]



     in HCl 500      -                          tablet by           ity

 of



     mg tablet      00:00: 04:59                          mouth           [place]



               00   :00                           every 12           Medical



                                                  (twelve)           [facility]



                                                  hours for           



                                                  10 days.           

 

     ciprofloxac      2022- No        [id number] 500mg      Take 1         

  [facility]



     in HCl 500      -                          tablet by           ity

 of



     mg tablet      00:00: 04:59                          mouth           [place]



               00   :00                           every 12           Medical



                                                  (twelve)           [facility]



                                                  hours for           



                                                  10 days.           

 

     HYDROmorpho      2022- No        4647 4mg       Take 1           Uni

vers



     ne 4 mg      -                          tablet by           ity of



     tablet      00:00: 04:59                          mouth           [place]



               00   :00                           every 12           Medical



                                                  (twelve)           [facility]



                                                  hours for           



                                                  7 days.           



                                                  Indication           



                                                  s: acute           



                                                  pain           

 

     HYDROmorpho      2022- No        4647 4mg       Take 1           Uni

vers



     ne 4 mg      --03                          tablet by           ity of



     tablet      00:00: 04:59                          mouth           [place]



               00   :00                           every 12           Medical



                                                  (twelve)           [facility]



                                                  hours for           



                                                  7 days.           



                                                  Indication           



                                                  s: acute           



                                                  pain           

 

     HYDROmorpho      2022- No        4647 4mg       Take 1           Uni

vers



     ne 4 mg      6-25 -03                          tablet by           ity of



     tablet      00:00: 04:59                          mouth           [place]



               00   :00                           every 12           Medical



                                                  (twelve)           [facility]



                                                  hours for           



                                                  7 days.           



                                                  Indication           



                                                  s: acute           



                                                  pain           

 

     HYDROmorpho      2022- No        4647 4mg       Take 1           Uni

vers



     ne 4 mg      6-25 07-03                          tablet by           ity of



     tablet      00:00: 04:59                          mouth           [place]



               00   :00                           every 12           Medical



                                                  (twelve)           [facility]



                                                  hours for           



                                                  7 days.           



                                                  Indication           



                                                  s: acute           



                                                  pain           

 

     ciprofloxac      2022- No        [id number] 500mg      Take 1         

  [facility]



     in HCl 500                                tablet by           ity

 of



     mg tablet      00:00: 00:00                          mouth           [place]



               00   :00                           every 12           Medical



                                                  (twelve)           [facility]



                                                  hours for           



                                                  10 days.           

 

     NaCl 0.9%      2022- No             1000mL      at 150           Uni

vers



     (NS) bolus                                mL/hr,           ity of



     infusion      23:15: 23:38                          1,000 mL,           [facility]

as



     1,000 mL      00   :00                           IV             Medical



                                                  Piggyback,           [facility]



                                                  ONCE, 1           



                                                  dose, On           



                                                  22 at           



                                                  1815, STAT           

 

     HYDROmorpho      2022- No             1mg       1 mg, Slow          

 [facility]



     ne                                  IV Push,           ity of



     (DILAUDID)      22:15: 21:46                          ONCE, 1           [facility]

as



     injection 1      00   :00                           dose, On           Medi

[place]



     mg                                           Fri            [place]



                                                  22 at           



                                                  1715,           



                                                  Routine<br           



                                                  >Use           



                                                  approved           



                                                  by             



                                                  (Faculty):           



                                                  ADC            



                                                  PROVIDER           

 

     enoxaparin            Yes            40mg      40 mg,           Unive

rs



     (LOVENOX)                                     Subcutaneo           ity 

of



     injection      22:00:                               us, DAILY,           [facility]

[facility]



     40 mg      00                                 First dose           Medical



                                                  on Fri           [place]



                                                  22 at           



                                                  1700,           



                                                  Until           



                                                  Discontinu           



                                                  ed,            



                                                  Routine           

 

     glucagon            Yes            1mg       1 mg,           [facility]



     (GLUCAGEN      24                               Intravenou           ity 

of



     DIAGNOSTIC      21:57:                               s, PRN -           [facility]

as



     KIT)      53                                 SEE            Medical



     injection 1                                         INSTRUCTIO           Br

anch



     mg                                           NS,            



                                                  Starting           



                                                  on 22 at           



                                                  1657,           



                                                  Until           



                                                  Discontinu           



                                                  ed,            



                                                  Routine,           



                                                  hypoglycem           



                                                  ia             

 

     glucagon      0      Yes            1mg       1 mg,           [facility]



     (GLUCAGEN      24                               Intramuscu           ity 

of



     DIAGNOSTIC      21:57:                               lar, PRN,           [facility]

[facility]



     KIT)      44                                 Starting           Medical



     injection 1                                         on Fri           Branch



                                                22 at           



                                                  1657,           



                                                  Until           



                                                  Discontinu           



                                                  ed, ASAP,           



                                                  Blood           



                                                  Glucose           



                                                  &amp;lt;           



                                                  or = 70           



                                                  mg/dL and           



                                                  patient is           



                                                  unable to           



                                                  swallow or           



                                                  has mental           



                                                  changes.           

 

     HYDROmorpho            Yes            4mg       4 mg,           Unive

rs



     ne                                       Oral,           ity of



     (DILAUDID)      13:00:                               Q12H,           [place]



     tablet 4 mg      00                                 First dose           Me

dical



                                                  on Fri           Branch



                                                  22 at           



                                                  0800,           



                                                  Until           



                                                  Discontinu           



                                                  ed             

 

     insulin            Yes            15U       15 Units,           Unive

rs



     lispro                                     Subcutaneo           ity of



     (human)      12:30:                               us, TIDAC,           [facility]

s



     (HumaLOG      00                                 First dose           Medic

al



     U-100)                                         on Fri           Branch



     injection                                         22 at           



     15 Units                                         0730,           



                                                  Until           



                                                  Discontinu           



                                                  ed,            



                                                  Routine           

 

     amLODIPine            Yes            10mg      10 mg,           Unive

rs



     (NORVASC)                                     Oral,           ity of



     tablet 10      08:45:                               DAILY,           [place]



     mg        00                                 First dose           Medical



                                                  on Fri           Branch



                                                  22 at           



                                                  0345,           



                                                  Until           



                                                  Discontinu           



                                                  ed,            



                                                  Routine           

 

     ertapenem       No             1000mg      1,000 mg,           

[facility]



     (INVANZ)       0625                          IV             ity of



     1,000 mg in      08:45: 13:51                          Piggyback,          

 [place]



     NaCl 0.9%      00   :26                           Q24H ABX,           Medic

al



     (NS) 50 mL                                         First dose           Bra

[facility]



     MINI-BAG                                         on 22 at           



                                                  0345,           



                                                  Until           



                                                  Discontinu           



                                                  ed,            



                                                  Administer           



                                                  over 30           



                                                  Minutes,           



                                                  50             



                                                  mL<br>Reas           



                                                  on for           



                                                  Anti-Infec           



                                                  tive:           



                                                  Empiric           



                                                  Therapy           



                                                  for            



                                                  Suspected           



                                                  Infection<           



                                                  br>Empiric           



                                                  Therapy           



                                                  Site:           



                                                  Urine<br>D           



                                                  uration of           



                                                  therapy: 7           



                                                  days<br>Re           



                                                  stricted           



                                                  use            



                                                  approved           



                                                  by:            



                                                  History of           



                                                  ESBL           



                                                  infection           



                                                  in past 3           



                                                  months           

 

     hydralAZINE            Yes            10mg      10 mg,           Univ

ers



     (APRESOLINE                                     Slow IV           ity o

f



     ) injection      08:33:                               Push,           [place]



     10 mg      28                                 Q6HPRN,           Medical



                                                  Starting           Branch



                                                  on 22 at           



                                                  0333,           



                                                  Until           



                                                  Discontinu           



                                                  ed,            



                                                  Routine,           



                                                  DBP=&gt;10           



                                                  0;             



                                                  SBP=>160,           



                                                  For SBP >           



                                                  160            

 

     acetaminoph            Yes            650mg      650 mg,           Un

[facility]



     en                                       Oral,           ity of



     (TYLENOL)      07:37:                               Q6HPRN,           [place]



     tablet 650      37                                 Starting           Medic

al



     mg                                           on Fri           Branch



                                                  22 at           



                                                  0237,           



                                                  Until           



                                                  Discontinu           



                                                  ed,            



                                                  Routine,           



                                                  Pain           



                                                  (scale           



                                                  1-3)           

 

     iopamidol      2022- No        [id number] 100mL      100 mL,          

 [facility]



     (ISOVUE                                Intravenou           ity o

f



     370-500 mL)      06:15: 05:05                          s, ONCE, 1          

 [place]



     injection      00   :00                           dose, On           Medica

l



     100 mL                                         Fri            Branch



                                                  22 at           



                                                  0115,           



                                                  Routine           

 

     insulin      2022- No             10U       10 Units,           Univ

ers



     regular                                IV Push,           ity of



     human      06:00: 05:20                          ONCE, 1           [place]



     (HUMULIN R)      00   :00                           dose, On           Medi

[facility]



     injection                                         Fri            Branch



     10 Units                                         22 at           



                                                  0100, ASAP           

 

     FENTanyl PF      2022- No             50ug      50 mcg,           Un

[place]



     (SUBLIMAZE                                Slow IV           ity o

f



     (PF))      05:45: 04:47                          Push,           [place]



     injection      00   :00                           ONCE, 1           Medical



     50 mcg                                         dose, On           Branch



                                                  Fri            



                                                  22 at           



                                                  0045, STAT           

 

     FENTanyl PF      2022- No             50ug      50 mcg,           Un

[facility]



     (SUBLIMAZE                                Slow IV           ity o

f



     (PF))      04:15: 04:00                          Push,           [place]



     injection      00   :00                           ONCE, 1           Medical



     50 mcg                                         dose, On           Branch



                                                  u            



                                                  22 at           



                                                  2315, STAT           

 

     NaCl 0.9%      2022- No             1000mL      at 999           Uni

vers



     (NS) bolus                                mL/hr,           ity of



     infusion      04:15: 06:41                          1,000 mL,           Eric

as



     1,000 mL      00   :00                           IV             Medical



                                                  Infusion,           Branch



                                                  ONCE, 1           



                                                  dose, On           



                                                  Thu            



                                                  22 at           



                                                  2315, STAT           

 

     hydromorpho            Yes            4ug       Take 4 mcg           

[facility]



     ne HCl      6-10                               by mouth           ity of



     (HYDROMORPH      19:50:                               every 12           Te

[facility]



     ONE ORAL)      08                                 (twelve)           Medica

l



                                                  hours.           Branch

 

     magnesium            Yes            400mg      400 mg,           Univ

ers



     oxide      6-10                               Oral,           ity of



     (MAG-OX      14:00:                               DAILY,           [place]



     400) tablet      00                                 First dose           Me

dical



     400 mg                                         on Fri           Branch



                                                  6/10/22 at           



                                                  0900,           



                                                  Until           



                                                  Discontinu           



                                                  ed,            



                                                  Routine           

 

     HYDROmorpho      2022- No             1mg       1 mg, Slow          

 [facility]



     ne        6-10 06-10                          IV Push,           ity of



     (DILAUDID)      05:15: 04:42                          ONCE, 1           Eric

as



     injection 1      00   :00                           dose, On           Medi

[facility]



     mg                                           Fri            [place]



                                                  6/10/22 at           



                                                  0015,           



                                                  Routine<br           



                                                  >Use           



                                                  approved           



                                                  by             



                                                  (Faculty):           



                                                  ADC            



                                                  PROVIDER           

 

     acetaminoph            Yes            1000mg      1,000 mg,          

 [facility]



     en        6-10                               Oral, Q8H,           ity of



     (TYLENOL)      03:00:                               First dose           Te

[place]



     tablet      00                                 on Thu           Medical



     1,000 mg                                         22 at           Branch



                                                  2200,           



                                                  Until           



                                                  Discontinu           



                                                  ed,            



                                                  Routine           

 

     insulin            Yes       [id number] 52U       inject 52           U

nivers



     glargine      6-10                               Units           ity of



     100 unit/mL      00:00:                               under the           T

[facility]



     injection      00                                 skin at           Medical



                                                  bedtime.           Branch

 

     insulin      -0      Yes       [id number] 52U       inject 52           U

nivers



     glargine      6-10                               Units           ity of



     100 unit/mL      00:00:                               under the           T

[facility]



     injection      00                                 skin at           Medical



                                                  bedtime.           Branch

 

     insulin      -0      Yes       [id number] 52U       inject 52           U

nivers



     glargine      6-10                               Units           ity of



     100 unit/mL      00:00:                               under the           T

[facility]



     injection      00                                 skin at           Medical



                                                  bedtime.           Branch

 

     insulin      -0      Yes       [id number] 52U       inject 52           U

nivers



     glargine      6-10                               Units           ity of



     100 unit/mL      00:00:                               under the           T

[facility]



     injection      00                                 skin at           Medical



                                                  bedtime.           Branch

 

     insulin      -0      Yes       [id number] 52U       inject 52           U

nivers



     glargine      6-10                               Units           ity of



     100 unit/mL      00:00:                               under the           T

[facility]



     injection      00                                 skin at           Medical



                                                  bedtime.           Branch

 

     insulin      -0      Yes       [id number] 52U       inject 52           U

nivers



     glargine      6-10                               Units           ity of



     100 unit/mL      00:00:                               under the           T

[facility]



     injection      00                                 skin at           Medical



                                                  bedtime.           Branch

 

     insulin      -0      Yes       [id number] 52U       inject 52           U

nivers



     glargine      6-10                               Units           ity of



     100 unit/mL      00:00:                               under the           T

exas



     injection      00                                 skin at           Medical



                                                  bedtime.           Branch

 

     insulin            Yes       [id number] 52U       inject 52           U

nivers



     glargine      6-10                               Units           ity of



     100 unit/mL      00:00:                               under the           T

exas



     injection      00                                 skin at           Medical



                                                  bedtime.           Branch

 

     insulin      2022- No        [id number] 52U       inject 52           

[facility]



     glargine      6-10 07-30                          Units           ity of



     100 unit/mL      00:00: 00:00                          under the           

[place]



     injection      00   :00                           skin at           Medical



                                                  bedtime.           Branch

 

     insulin      2022- No        [id number] 52U       inject 52           

[facility]



     glargine      6-10 07-30                          Units           ity of



     100 unit/mL      00:00: 00:00                          under the           

[place]



     injection      00   :00                           skin at           Medical



                                                  bedtime.           Branch

 

     Insulin      2022- No        [id number]           Use as           Uni

vers



     Safety      6-10 07-09                          directed           ity of



     Winkelman,      00:00: 04:59                                         [place]



     Disp, 29      00   :00                                          Medical



     gauge x                                                        Branch



     3/16" Ndle                                                        

 

     Insulin      2022- No        [id number]           Use as           Uni

vers



     Safety      6-10 07-09                          directed           ity of



     Winkelman,      00:00: 04:59                                         [place]



     Disp, 29      00   :00                                          Medical



     gauge x                                                        Branch



     3/16" Ndle                                                        

 

     Insulin      -0 - No        [id number]           Use as           Uni

vers



     Safety      6-10 07-09                          directed           ity of



     Winkelman,      00:00: 04:59                                         [place]



     Disp, 29      00   :00                                          Medical



     gauge x                                                        Branch



     3/16" Ndle                                                        

 

     Insulin      -0 - No        [id number]           Use as           Uni

vers



     Safety      6-10 07-09                          directed           ity of



     Winkelman,      00:00: 04:59                                         [place]



     Disp, 29      00   :00                                          Medical



     gauge x                                                        Branch



     3/16" Ndle                                                        

 

     Insulin      -0 - No        [id number]           Use as           Uni

vers



     Safety      6-10 07-09                          directed           ity of



     Winkelman,      00:00: 04:59                                         [place]



     Disp, 29      00   :00                                          Medical



     gauge x                                                        Branch



     3/16" Ndle                                                        

 

     Insulin      -0 - No        [id number]           Use as           Uni

vers



     Safety      6-10 07-09                          directed           ity of



     Winkelman,      00:00: 04:59                                         [place]



     Disp, 29      00   :00                                          Medical



     gauge x                                                        Branch



     3/16" Ndle                                                        

 

     Insulin      -0 - No        [id number]           Use as           Uni

vers



     Safety      6-10 07-09                          directed           ity of



     Winkelman,      00:00: 04:59                                         [place]



     Disp, 29      00   :00                                          Medical



     gauge x                                                        Branch



     3/16" Ndle                                                        

 

     fluconazole      2022- No        [id number] 200mg      Take 1         

  [facility]



     200 mg      6-10 06-23                          tablet by           ity of



     tablet      00:00: 04:59                          mouth           [place]



               00   :00                           daily for           Medical



                                                  12 days.           Branch

 

     magnesium      2022- No             4g        4 g, IV           Univ

ers



     sulfate in                                Piggyback,           it

y of



     water 4      20:45: 21:58                          ONCE, 1           [place]



     gram/50 mL      00   :00                           dose, On           Medic

al



     (8 %) IV                                         Thu 22           Branc

h



     Piggyback 4                                         at 1545,           



     g                                            Routine           

 

     HYDROmorpho            Yes            4mg       4 mg,           Unive

rs



     ne                                       Oral,           ity of



     (DILAUDID)      19:32:                               Q6HPRN,           [place]

[place]



     tablet 4 mg      47                                 Starting           Medi

asrah



                                                  on Thu           Branch



                                                  22 at           



                                                  1432,           



                                                  Until           



                                                  Discontinu           



                                                  ed,            



                                                  Routine,           



                                                  Pain           



                                                  (scale           



                                                  7-10)           

 

     insulin            Yes            30U       30 Units,           Unive

rs



     glargine                                     Subcutaneo           ity o

f



     (LANTUS      02:00:                               us, [facility],           [place]



     U-100)      00                                 First dose           Medical



     injection                                         on Wed           Branch



     30 Units                                         22 at           



                                                  2100,           



                                                  Until           



                                                  Discontinu           



                                                  ed,            



                                                  Routine           

 

     HYDROmorpho      2022- No             .5mg      0.5 mg,           Un

[facility]elyn



     ne         0609                          Slow IV           ity of



     (DILAUDID)      16:28: 19:33                          Push,           [place]



     injection      03   :06                           Q4HPRN,           Medical



     0.5 mg                                         Starting           Branch



                                                  on 22 at           



                                                  1128,           



                                                  Until Thu           



                                                  22 at           



                                                  1433,           



                                                  Routine,           



                                                  Pain           



                                                  (scale           



                                                  7-10)<br>U           



                                                  se             



                                                  approved           



                                                  by             



                                                  (Faculty):           



                                                  ADC            



                                                  PROVIDER           

 

     fluconazole            Yes            200mg      200 mg,           Un

[place]



     (DIFLUCAN)                                     Oral,           ity of



     tablet 200      14:45:                               DAILY,           [place]



     mg        00                                 First dose           Medical



                                                  on Wed           Branch



                                                  22 at           



                                                  0945,           



                                                  Until           



                                                  Discontinu           



                                                  ed,            



                                                  ASAP<br>Re           



                                                  ason for           



                                                  Anti-Infec           



                                                  tive:           



                                                  Empiric           



                                                  Therapy           



                                                  for            



                                                  Suspected           



                                                  Infection<           



                                                  br>Empiric           



                                                  Therapy           



                                                  Site:           



                                                  Urine<br>D           



                                                  uration of           



                                                  therapy:           



                                                  72 hours           

 

     enoxaparin            Yes            40mg      40 mg,           Unive

rs



     (LOVENOX)                                     Subcutaneo           ity 

of



     injection      14:00:                               us, DAILY,           [place]

[place]



     40 mg      00                                 First dose           Medical



                                                  on Wed           Branch



                                                  22 at           



                                                  0900,           



                                                  Until           



                                                  Discontinu           



                                                  ed,            



                                                  Routine           

 

     tamsulosin            Yes            .4mg      0.4 mg,           Univ

ers



     (FLOMAX)                                     Oral,           ity of



     capsule 0.4      14:00:                               DAILY,           [place]

[place]



     mg        00                                 First dose           Medical



                                                  on Wed           Branch



                                                  22 at           



                                                  0900,           



                                                  Until           



                                                  Discontinu           



                                                  ed,            



                                                  Routine           

 

     insulin       No             10U       10 Units,           Univ

ers



     glargine                                Subcutaneo           ity 

of



     (LANTUS      12:00: 12:51                          us, ONCE,           [place]

[place]



     U-100)      00   :00                           1 dose, On           Medical



     injection                                         22           [facility]

ch



     10 Units                                         at 0700,           



                                                  Routine           

 

     HYDROmorpho      2022- No             1mg       1 mg, Slow          

 [facility]



     ne                                  IV Push,           ity of



     (DILAUDID)      10:00: 09:23                          ONCE, 1           Eric

as



     injection 1      00   :00                           dose, On           Medi

sarah



     mg                                           22           Branch



                                                  at 0500,           



                                                  Routine<br           



                                                  >Use           



                                                  approved           



                                                  by             



                                                  (Faculty):           



                                                  ADC            



                                                  PROVIDER           

 

     HYDROmorpho      2022- No             4mg       4 mg,           Univ

ers



     ne                                  Oral,           ity of



     (DILAUDID)      08:40: 16:28                          [id number],           Te

[place]



     tablet 4 mg      04   :14                           Starting           Medi

sarah



                                                  on 22 at           



                                                  0340,           



                                                  Until 22 at           



                                                  1128,           



                                                  Routine,           



                                                  Pain           



                                                  (scale           



                                                  7-10)           

 

     HYDROmorpho      2022- No             1mg       1 mg, Slow          

 [facility]



     ne                                  IV Push,           ity of



     (DILAUDID)      06:15: 05:41                          ONCE, 1           Eric

as



     injection 1      00   :00                           dose, On           Medi

sarah



     mg                                           22           Branch



                                                  at 0115,           



                                                  Routine<br           



                                                  >Use           



                                                  approved           



                                                  by             



                                                  (Faculty):           



                                                  ADC            



                                                  PROVIDER           

 

     aztreonam      2022- No             1000mg      1,000 mg,           

[facility]



     (AZACTAM)       06-10                          IV             ity of



     1,000 mg in      05:30: 16:58                          Piggyback,          

 [place]



     NaCl 0.9%      00   :42                           Q8H ABX,           Medica

l



     (NS) 100 mL                                         First dose           Br

anch



     MINI-BAG                                         on 22 at           



                                                  0030,           



                                                  Until           



                                                  Discontinu           



                                                  ed,            



                                                  Administer           



                                                  over 30           



                                                  Minutes,           



                                                  100            



                                                  mL<br>Reas           



                                                  on for           



                                                  Anti-Infec           



                                                  tive:           



                                                  Empiric           



                                                  Therapy           



                                                  for            



                                                  Suspected           



                                                  Infection<           



                                                  br>Empiric           



                                                  Therapy           



                                                  Site:           



                                                  Urine<br>D           



                                                  uration of           



                                                  therapy: 7           



                                                  days           

 

     Sliding            Yes                      Subcutaneo           [facility]

ers



     Scale                                     us, Q4H,           ity of



     Insulin-Reg      05:00:                               First dose           

[place]



     ular + Fsbg      00                                 on Wed           Medica

l



     Testing                                         22 at           Branch



                                                  0000,           



                                                  Until           



                                                  Discontinu           



                                                  ed,            



                                                  Routine           

 

     NaCl 0.9%      2022- No             1000mL      at 125           Uni

vers



     (NS) IV      -08                          mL/hr, IV           ity of



     infusion      04:30: 16:28                          Infusion,           Eric

as



     1,000 mL      00   :27                           CONTINUOUS           Medic

al



                                                  , Starting           Branch



                                                  on 22 at           



                                                  2330,           



                                                  Until 22 at           



                                                  1128,           



                                                  Routine           

 

     glucagon            Yes            1mg       1 mg,           [facility]



     (GLUCAGEN                                     Intravenou           ity 

of



     DIAGNOSTIC      04:10:                               s, PRN -           Eric

as



     KIT)      53                                 SEE            Medical



     injection 1                                         INSTRUCTIO           Br

anch



     mg                                           NS,            



                                                  Starting           



                                                  on 22 at           



                                                  2310,           



                                                  Until           



                                                  Discontinu           



                                                  ed,            



                                                  Routine,           



                                                  For Blood           



                                                  glucose <           



                                                  70             

 

     proCHLORper            Yes            10mg      10 mg,           Univ

ers



     azine                                     Slow IV           ity of



     (COMPAZINE)      04:10:                               Push,           [place]



     injection      40                                 Q6HPRN,           Medical



     10 mg                                         Starting           Branch



                                                  on 22 at           



                                                  2310,           



                                                  Until           



                                                  Discontinu           



                                                  ed,            



                                                  Routine,           



                                                  Nausea and           



                                                  Vomiting           



                                                  (N/V)           

 

     FENTanyl PF      2022- No             50ug      50 mcg,           Un

[place]



     (SUBLIMAZE      -08                          Slow IV           ity o

f



     (PF))      03:04: 03:37                          Push,           [place]



     injection      00   :00                           ONCE, 1           Medical



     50 mcg                                         dose, On           Branch



                                                  22           



                                                  at 2215,           



                                                  STAT           

 

     NaCl 0.9%      2022- No             1000mL      at 999           Uni

vers



     (NS) IV       06-08                          mL/hr,           ity of



     infusion      01:16: 01:54                          Intravenou           Te

[place]



     1,000 mL      00   :00                           s, ONCE, 1           Medic

al



                                                  dose, On           22           



                                                  at 2030,           



                                                  Routine           

 

     insulin      2022- No             .1U/kg      13.7 Units           U

nivers



     regular      -08                          (rounded           ity of



     human      01:15: 01:51                          from 13.74           [place]



     (HUMULIN R)      00   :00                           Units =           Medic

al



     injection                                         0.1            Branch



     13.7 Units                                         Units/kg           



                                                  ?137.4           



                                                  kg), Slow           



                                                  IV Push,           



                                                  ONCE, 1           



                                                  dose, On           



                                                  22           



                                                  at 2030,           



                                                  STAT           

 

     FENTanyl PF      2022- No             50ug      50 mcg,           Un

[place]



     (SUBLIMAZE                                Slow IV           ity o

f



     (PF))      01:00: 01:52                          Push,           [place]



     injection      00   :00                           ONCE, 1           Medical



     50 mcg                                         dose, On           [place]



                                                  22           



                                                  at 2015,           



                                                  ASAP           

 

     NaCl 0.9%      2022- No             1000mL      at 999           Uni

vers



     (NS) IV       06-08                          mL/hr,           ity of



     infusion      23:47: 00:53                          Intravenou           Te

[place]



     1,000 mL      00   :00                           s, ONCE, 1           Medic

al



                                                  dose, On           [place]



                                                  22           



                                                  at 1900,           



                                                  ASAP           

 

     hydromorpho            Yes            4ug       Take 4 mcg           

[facility]



     ne HCl      6-07                               by mouth           ity of



     (HYDROMORPH      23:17:                               every 12           Te

x[place]



     ONE ORAL)      17                                 (twelve)           Medica

l



                                                  hours.           Branch

 

     hydromorpho            Yes            4ug       Take 4 mcg           

[facility]



     ne HCl      6-06                               by mouth           ity of



     (HYDROMORPH      18:29:                               every 12           Te

xas



     ONE ORAL)      31                                 (twelve)           Medica

l



                                                  hours.           Branch

 

     insulin            Yes       [id number] 15U       inject 15           U

nivers



     lispro,      6-06                               Units           ity of



     human, 100      00:00:                               under the           Te

[place]



     unit/mL      00                                 skin 3           Medical



     injection                                         (three)           Branch



                                                  times           



                                                  daily           



                                                  before           



                                                  meals.           

 

     insulin      -0      Yes        15U       inject 15           U

nivers



     lispro,      6-06                               Units           ity of



     human, 100      00:00:                               under the           Te

[place]



     unit/mL      00                                 skin 3           Medical



     injection                                         (three)           Branch



                                                  times           



                                                  daily           



                                                  before           



                                                  meals.           

 

     insulin      -0      Yes        15U       inject 15           U

nivers



     lispro,      6-06                               Units           ity of



     human, 100      00:00:                               under the           Te

[place]



     unit/mL      00                                 skin 3           Medical



     injection                                         (three)           Branch



                                                  times           



                                                  daily           



                                                  before           



                                                  meals.           

 

     insulin      0      Yes        15U       inject 15           U

nivers



     lispro,      6-06                               Units           ity of



     human, 100      00:00:                               under the           [facility]

[facility]



     unit/mL      00                                 skin 3           Medical



     injection                                         (three)           [facility]



                                                  times           



                                                  daily           



                                                  before           



                                                  meals.           

 

     insulin      0      Yes        15U       inject 15           U

nivers



     lispro,      6-06                               Units           ity of



     human, 100      00:00:                               under the           [facility]

[facility]



     unit/mL      00                                 skin 3           Medical



     injection                                         (three)           [facility]



                                                  times           



                                                  daily           



                                                  before           



                                                  meals.           

 

     insulin      0      Yes        15U       inject 15           U

nivers



     lispro,      6-06                               Units           ity of



     human, 100      00:00:                               under the           [facility]

[facility]



     unit/mL      00                                 skin 3           Medical



     injection                                         (three)           [facility]



                                                  times           



                                                  daily           



                                                  before           



                                                  meals.           

 

     insulin      0      Yes       [id number] 52U       inject 52           U

nivers



     glargine      6-06                               Units           ity of



     100 unit/mL      00:00:                               under the           [facility]

[facility]



     injection      00                                 skin at           Medical



                                                  bedtime.           Branch

 

     insulin      -0      Yes        15U       inject 15           U

nivers



     lispro,      6-06                               Units           ity of



     human, 100      00:00:                               under the           [facility]

[facility]



     unit/mL      00                                 skin 3           Medical



     injection                                         (three)           [facility]



                                                  times           



                                                  daily           



                                                  before           



                                                  meals.           

 

     insulin      -0      Yes       [id number] 52U       inject 52           U

nivers



     glargine      6-06                               Units           ity of



     100 unit/mL      00:00:                               under the           T

[facility]



     injection      00                                 skin at           Medical



                                                  bedtime.           Branch

 

     insulin      0      Yes        15U       inject 15           U

nivers



     lispro,      6-06                               Units           ity of



     human, 100      00:00:                               under the           [facility]

[facility]



     unit/mL      00                                 skin 3           Medical



     injection                                         (three)           [facility]



                                                  times           



                                                  daily           



                                                  before           



                                                  meals.           

 

     insulin            Yes       [id number] 15U       inject 15           U

nivers



     lispro,      6-06                               Units           ity of



     human, 100      00:00:                               under the           [facility]

[facility]



     unit/mL      00                                 skin 3           Medical



     injection                                         (three)           [facility]



                                                  times           



                                                  daily           



                                                  before           



                                                  meals.           

 

     insulin            Yes       [id number] 15U       inject 15           U

nivers



     lispro,      6-06                               Units           ity of



     human, 100      00:00:                               under the           [facility]

[facility]



     unit/mL      00                                 skin 3           Medical



     injection                                         (three)           [facility]



                                                  times           



                                                  daily           



                                                  before           



                                                  meals.           

 

     insulin      2022- No        [id number] 15U       inject 15           

[facility]



     lispro,      6-06 07-30                          Units           ity of



     human, 100      00:00: 00:00                          under the           [facility]

[facility]



     unit/mL      00   :00                           skin 3           Medical



     injection                                         (three)           [facility]



                                                  times           



                                                  daily           



                                                  before           



                                                  meals.           

 

     insulin      2022- No        [id number] 15U       inject 15           

[facility]



     lispro,      6-06 07-30                          Units           ity of



     human, 100      00:00: 00:00                          under the           T

[facility]



     unit/mL      00   :00                           skin 3           Medical



     injection                                         (three)           [facility]



                                                  times           



                                                  daily           



                                                  before           



                                                  meals.           

 

     insulin      2022- No        [id number] 52U       inject 52           

[facility]



     glargine       06-10                          Units           ity of



     100 unit/mL      00:00: 00:00                          under the           

[place]



     injection      00   :00                           skin at           Medical



                                                  bedtime.           Branch

 

     HYDROmorpho       No             .5mg      0.5 mg,           Un

[facility]



     ne         06-06                          Slow IV           ity of



     (DILAUDID)      18:15: 18:14                          Push,           [place]



     injection      00   :00                           Q8HPRN,           Medical



     0.5 mg                                         Starting           Branch



                                                  on Sun           



                                                  22 at           



                                                  1315,           



                                                  Until Mon           



                                                  22 at           



                                                  1314,           



                                                  Routine,           



                                                  Pain           



                                                  (scale           



                                                  7-10)<br>U           



                                                  se             



                                                  approved           



                                                  by             



                                                  (Faculty):           



                                                  [facility]            



                                                  PROVIDER           

 

     insulin            Yes            .4U/kg/      52 Units           Uni

vers



     glargine      6-05                     d         (rounded           ity of



     (LANTUS      14:43:                               from 51.6           [place]



     U-100)      43                                 Units =           Medical



     injection                                         0.4            [facility]



     52 Units                                         Units/kg/d           



                                                  ay ?129           



                                                  kg),           



                                                  Subcutaneo           



                                                  us, Q24H,           



                                                  First dose           



                                                  on Sun           



                                                  22 at           



                                                  0944,           



                                                  Until           



                                                  Discontinu           



                                                  ed,            



                                                  Routine           

 

     HYDROmorpho            Yes            4mg       4 mg,           Unive

rs



     ne        6-05                               Oral,           ity of



     (DILAUDID)      02:21:                               Q6HPRN,           [place]

[facility]



     tablet 4 mg      55                                 Starting           Medi

[facility]



                                                  on Sat           Branch



                                                  22 at           



                                                  2121,           



                                                  Until           



                                                  Discontinu           



                                                  ed,            



                                                  Routine,           



                                                  Pain           



                                                  (scale           



                                                  4-6)           

 

     HYDROmorpho      2022- No             1mg       1 mg, Slow          

 [facility]



     ne         06-05                          IV Push,           ity of



     (DILAUDID)      02:21: 18:05                          Q4HPRN,           [facility]

as



     injection 1      39   :59                           Starting           Medi

[facility]



     mg                                           on Sat           Branch



                                                  22 at           



                                                  2121,           



                                                  Until Sun           



                                                  22 at           



                                                  1305,           



                                                  Routine,           



                                                  Pain           



                                                  (scale           



                                                  7-10)<br>U           



                                                  se             



                                                  approved           



                                                  by             



                                                  (Faculty):           



                                                  [facility]            



                                                  PROVIDER           

 

     Sliding            Yes                      Subcutaneo           Univ

ers



     Scale      6-04                               us, Q4H,           ity of



     Insulin -      17:00:                               First dose           Te

[facility]



     lispro      00                                 on Sat           Medical



     (humaLOG) +                                         22 at           [facility]



     Fsbg                                         1200,           



     Testing                                         Until           



                                                  Discontinu           



                                                  ed,            



                                                  Routine           

 

     insulin            Yes            .35U/kg      15 Units           Uni

vers



     lispro      6-04                     /d        (rounded           ity of



     (human)      17:00:                               from 15.05           [place]

[facility]



     (HumaLOG      00                                 Units =           Medical



     U-100)                                         0.35           Branch



     injection                                         Units/kg/d           



     15 Units                                         ay ?129           



                                                  kg),           



                                                  Subcutaneo           



                                                  us, TID           



                                                  MEALS,           



                                                  First dose           



                                                  on Sat           



                                                  22 at           



                                                  1200,           



                                                  Until           



                                                  Discontinu           



                                                  ed,            



                                                  Routine           

 

     proMETHazin            Yes            25mg      25 mg,           Univ

ers



     e         6-04                               Oral,           ity of



     (PHENERGAN)      15:09:                               Q6HPRN,           Eric

as



     tablet 25      59                                 Starting           Medica

l



     mg                                           on Sat           Branch



                                                  22 at           



                                                  1009,           



                                                  Until           



                                                  Discontinu           



                                                  ed,            



                                                  Routine,           



                                                  Nausea and           



                                                  Vomiting           



                                                  (N/V)           

 

     HYDROmorpho      2022- No             4mg       4 mg,           Univ

ers



     ne        6- 06-05                          Oral,           ity of



     (DILAUDID)      14:47: 02:22                          Q6HPRN,           Eric

as



     tablet 4 mg      19   :08                           Starting           Medi

[facility]



                                                  on Sat           Branch



                                                  22 at           



                                                  0947,           



                                                  Until Sat           



                                                  22 at           



                                                  2122,           



                                                  Routine,           



                                                  Pain           



                                                  (scale           



                                                  7-10)           

 

     potassium      2022- No             20meq      20 mEq, IV           

[facility]



     chloride 20                                Piggyback,           i

ty of



     mEq/100 mL      23:45: 03:11                          Q2H, 2           [place]

[facility]



     (KCL) 20      00   :00                           doses,           Medical



     mEq/100 mL                                         First dose           Bra

nch



     RTU IVPB 20                                         on Fri           



     mEq                                          6/3/22 at           



                                                  1845, Last           



                                                  dose on           



                                                  Fri 6/3/22           



                                                  at 2000,           



                                                  100 mL           

 

     HYDROmorpho      2022- No             1mg       1 mg, Slow          

 [facility]



     ne                                  IV Push,           ity of



     (DILAUDID)      18:08: 14:45                          Q4HPRN,           [facility]

as



     injection 1      31   :34                           Starting           Medi

[facility]



     mg                                           on Fri           Branch



                                                  6/3/22 at           



                                                  1308,           



                                                  Until Sat           



                                                  22 at           



                                                  0945,           



                                                  Routine,           



                                                  Pain           



                                                  (scale           



                                                  7-10)<br>U           



                                                  se             



                                                  approved           



                                                  by             



                                                  (Faculty):           



                                                  [facility]            



                                                  PROVIDER           

 

     enoxaparin            Yes            40mg      40 mg,           Unive

rs



     (LOVENOX)                                     Subcutaneo           ity 

of



     injection      14:00:                               us, DAILY,           Te

[facility]



     40 mg      00                                 First dose           Medical



                                                  on Fri           Branch



                                                  6/3/22 at           



                                                  0900,           



                                                  Until           



                                                  Discontinu           



                                                  ed,            



                                                  Routine           

 

     lactobacill            Yes            .5mg      0.5 mg,           Uni

vers



     us                                       Oral, BID,           ity of



     acidophilus      13:00:                               First dose           

[place]



     tablet 0.5      00                                 on Fri           Medical



     mg                                           6/3/22 at           Branch



                                                  0800,           



                                                  Until           



                                                  Discontinu           



                                                  ed,            



                                                  Routine           

 

     docusate            Yes            100mg      100 mg,           Unive

rs



     (COLACE)                                     Oral, BID,           ity o

f



     capsule 100      13:00:                               First dose           

[place]



     mg        00                                 on Fri           Medical



                                                  6/3/22 at           Branch



                                                  0800,           



                                                  Until           



                                                  Discontinu           



                                                  ed,            



                                                  Routine           

 

     cefTRIAXone      2022- No             1000mg      1,000 mg,         

  [facility]



     (ROCEPHIN)      03                          IV             ity of



     1,000 mg in      11:30: 16:04                          Piggyback,          

 [place]



     NaCl 0.9%      00   :40                           Q24H ABX,           Medic

al



     (NS) 50 mL                                         First dose           Bra

[facility]



     MINI-BAG                                         on Fri           



                                                  6/3/22 at           



                                                  0630,           



                                                  Until           



                                                  Discontinu           



                                                  ed,            



                                                  Administer           



                                                  over 30           



                                                  Minutes,           



                                                  50             



                                                  mL<br>Reas           



                                                  on for           



                                                  Anti-Infec           



                                                  tive:           



                                                  Documented           



                                                  Infection<           



                                                  br>Documen           



                                                  huma            



                                                  Infection           



                                                  Site:           



                                                  Urine<br>D           



                                                  uration of           



                                                  Therapy: 7           



                                                  days           

 

     proMETHazin      2022- No             25mg      25 mg, IV           

[facility]



     e                                   Piggyback,           ity of



     (PHENERGAN)      11:22: 15:10                          Q6HPRN,           [facility]

[facility]



     25 mg in      38   :15                           Starting           Medical



     NaCl 0.9%                                         on Fri           Branch



     (NS) 50 mL                                         6/3/22 at           



     IV                                           0622,           



     piggyback                                         Until Sat           



                                                  22 at           



                                                  1010,           



                                                  Routine,           



                                                  Nausea and           



                                                  Vomiting           



                                                  (N/V)           

 

     acetaminoph            Yes            650mg      650 mg,           Un

[facility]



     en                                       Oral,           ity of



     (TYLENOL)      11:04:                               Q6HPRN,           [place]



     tablet 650      03                                 Starting           Medic

al



     mg                                           on Fri           Branch



                                                  6/3/22 at           



                                                  0604,           



                                                  Until           



                                                  Discontinu           



                                                  ed,            



                                                  Routine,           



                                                  Pain           



                                                  (scale           



                                                  1-3), Temp           



                                                  > 38.5 C           

 

     HYDROmorpho      2022- No             2mg       2 mg, Slow          

 [facility]



     ne                                  IV Push,           ity of



     (DILAUDID)      11:03: 18:08                          Q4HPRN,           [facility]

as



     injection 2      22   :44                           Starting           Medi

sarah



     mg                                           on Fri           [facility]



                                                  6/3/22 at           



                                                  0603,           



                                                  Until Fri           



                                                  6/3/22 at           



                                                  1308,           



                                                  Routine,           



                                                  Pain           



                                                  (scale           



                                                  7-10)<br>U           



                                                  se             



                                                  approved           



                                                  by             



                                                  (Faculty):           



                                                  CLC            



                                                  PROVIDER           

 

     NaCl 0.9%            Yes            10mL      10 mL,           Univer

s



     (NS)                                     Slow IV           ity of



     injection      10:58:                               Push, PRN,           [facility]

[facility]



     10 mL      34                                 Starting           Medical



                                                  on Fri           [facility]anch



                                                  6/3/22 at           



                                                  0558,           



                                                  Until           



                                                  Discontinu           



                                                  ed,            



                                                  Routine,           



                                                  line           



                                                  maintenanc           



                                                  e              

 

     NaCl 0.45%      2022- No             1000mL                     Univ

ers



     (1/2NS)                                               ity of



     1000 mL +      10:30: 14:45                                         [place]



     KCL 20 mEq      00   :34                                          Medical



                                                                 Branch

 

     D5W 0.45%      2022- No                       IV             [facility]



     NaCl                                Infusion,           ity of



     (1/2NS) 1 L      10:21: 14:45                          at 200           Eric

as



     + KCL 20      33   :34                           mL/hr, PRN           Medic

al



     mEq                                          - SEE           Branch



                                                  INSTRUCTIO           



                                                  NS,            



                                                  Starting           



                                                  on Fri           



                                                  6/3/22 at           



                                                  0521,           



                                                  Until Sat           



                                                  22 at           



                                                  0945,           



                                                  ASAP,           



                                                  Blood           



                                                  glucose           



                                                  control           

 

     acetaminoph      2022- No             1000mg      1,000 mg,         

  [facility]



     en                                  Oral,           ity of



     (TYLENOL)      08:45: 07:36                          ONCE, 1           [place]

[place]



     tablet      00   :00                           dose, On           Medical



     1,000 mg                                         Fri 6/3/22           [facility]

h



                                                  at 0345,           



                                                  ASAP           

 

     insulin      2022- No             8U        8 Units,           Unive

rs



     regular                                Slow IV           ity of



     human      08:15: 07:13                          Push,           [place]



     (HUMULIN R)      00   :00                           ONCE, 1           Medic

al



     injection 8                                         dose, On           B[facility]

ch



     Units                                         Fri 6/3/22           



                                                  at 0315,           



                                                  Routine           

 

     NaCl 0.9%       No             1000mL      at 999           Uni

vers



     (NS) bolus                                mL/hr,           ity of



     infusion      07:15: 06:12                          1,000 mL,           Eric

as



     1,000 mL      00   :00                           IV             Medical



                                                  Infusion,           [facility]



                                                  ONCE, 1           



                                                  dose, On           



                                                  Fri 6/3/22           



                                                  at 0215,           



                                                  STAT           

 

     insulin      2022- No             8U        8 Units,           Unive

rs



     regular                                Slow IV           ity of



     human      07:15: 06:21                          Push,           [place]



     (HUMULIN R)      00   :00                           ONCE, 1           Medic

al



     injection 8                                         dose, On           Bran

ch



     Units                                         Fri 6/3/22           



                                                  at 0215,           



                                                  STAT           

 

     iopamidol      2022- No        [id number] 100mL      100 mL,          

 [facility]



     (ISOVUE                                Intravenou           ity o

f



     370-500 mL)      05:45: 04:31                          s, ONCE, 1          

 [place]



     injection      00   :00                           dose, On           Medica

l



     100 mL                                         Fri 6/3/22           [facility]



                                                  at 0045,           



                                                  Routine           

 

     meropenem      2022- No             500mg      500 mg, IV           

[facility]



     (MERREM)                                Piggyback,           ity 

of



     500 mg in      05:00: 05:32                          ONCE, 1           [place]

[place]



     NaCl 0.9%      00   :00                           dose, On           Medica

l



     (NS) 50 mL                                         Fri 6/3/22           [facility]



     MINI-BAG                                         at 0000,           



                                                  Administer           



                                                  over 30           



                                                  Minutes,           



                                                  50             



                                                  mL<br&gt;R           



                                                  estricted           



                                                  use            



                                                  approved           



                                                  by:            



                                                  EMERGENCY           



                                                  DEPARTMENT           



                                                  PRESCRIBER           



                                                  <br>Reason           



                                                  for            



                                                  Anti-Infec           



                                                  tive:           



                                                  Empiric           



                                                  Therapy           



                                                  for            



                                                  Suspected           



                                                  Infection<           



                                                  br>Empiric           



                                                  Therapy           



                                                  Site:           



                                                  Abdominal<           



                                                  br>Duratio           



                                                  n of           



                                                  therapy:           



                                                  72 hours           

 

     proMETHazin      2022- No             25mg      25 mg, IV           

[facility]



     e                                   Piggyback,           ity of



     (PHENERGAN)      04:45: 03:49                          ONCE, 1           [facility]

[facility]



     25 mg in      00   :00                           dose, On           Medical



     NaCl 0.9%                                         Thu 22           Bran

ch



     (NS) 50 mL                                         at 2345,           



     IV                                           ASAP           



     piggyback                                                        

 

     NaCl 0.9%      2022- No             1000mL      at 999           Uni

vers



     (NS) bolus                                mL/hr,           ity of



     infusion      04:30: 06:04                          1,000 mL,           Eric

as



     1,000 mL      00   :00                           IV             Medical



                                                  Infusion,           [place]



                                                  ONCE, 1           



                                                  dose, On           



                                                  u 22           



                                                  at 2330,           



                                                  STAT           

 

     FENTanyl PF      2022- No             100ug      100 mcg,           

Univers



     (SUBLIMAZE                                Slow IV           ity o

f



     (PF))      03:30: 03:24                          Push,           [place]



     injection      00   :00                           ONCE, 1           Medical



     100 mcg                                         dose, On           Branch



                                                  Thu 22           



                                                  at 2230,           



                                                  STAT           

 

     cefTRIAXone            Yes            2000mg      2,000 mg,          

 [facility]



     (ROCEPHIN)      5-17                               Intramuscu           ity

 of



     injection      21:00:                               lar, Q24H,           Te

[facility]



     2,000 mg      00                                 First dose           Medic

al



                                                  on [facility]



                                                  22 at           



                                                  1600,           



                                                  Until           



                                                  Discontinu           



                                                  ed,            



                                                  ASAP<br>Re           



                                                  ason for           



                                                  Anti-Infec           



                                                  tive:           



                                                  Documented           



                                                  Infection<           



                                                  br>Documen           



                                                  huma            



                                                  Infection           



                                                  Site: Skin           



                                                  / Soft           



                                                  Tissue<br>           



                                                  Duration           



                                                  of             



                                                  Therapy:           



                                                  Other (see           



                                                  Comments)           

 

     hydromorpho      0      Yes            4ug       Take 4 mcg           

[facility]



     ne HCl      5-17                               by mouth           ity of



     (HYDROMORPH      17:43:                               every 12           Te

[facility]



     ONE ORAL)      55                                 (twelve)           Medica

l



                                                  hours.           Branch

 

     hydromorpho      -0      Yes            4ug       Take 4 mcg           

[facility]



     ne HCl      5-17                               by mouth           ity of



     (HYDROMORPH      17:43:                               every 12           Te

[facility]



     ONE ORAL)      55                                 (twelve)           Medica

l



                                                  hours.           Branch

 

     collagenase      -0      Yes       [id number]           Apply to         

  [facility]



     250       5-17                               affected           ity of



     unit/gram      00:00:                               area(s)           [place]



     ointment      00                                 daily.           Medical



                                                                 Branch

 

     collagenase      -0      Yes       [id number]           Apply to         

  [facility]



     250       5-17                               affected           ity of



     unit/gram      00:00:                               area(s)           [place]



     ointment      00                                 daily.           Medical



                                                                 Branch

 

     collagenase      -0      Yes       [id number]           Apply to         

  [facility]



     250       5-17                               affected           ity of



     unit/gram      00:00:                               area(s)           [place]



     ointment      00                                 daily.           Medical



                                                                 Branch

 

     collagenase      -0      Yes       [id number]           Apply to         

  [facility]



     250       5-17                               affected           ity of



     unit/gram      00:00:                               area(s)           [place]



     ointment      00                                 daily.           Medical



                                                                 Branch

 

     collagenase      -0      Yes       [id number]           Apply to         

  [facility]



     250       5-17                               affected           ity of



     unit/gram      00:00:                               area(s)           [place]



     ointment      00                                 daily.           Medical



                                                                 Branch

 

     collagenase      -0      Yes       [id number]           Apply to         

  Univers



     250       5-17                               affected           ity of



     unit/gram      00:00:                               area(s)           [place]



     ointment      00                                 daily.           Medical



                                                                 Branch

 

     collagenase      -0      Yes       [id number]           Apply to         

  Univers



     250       5-17                               affected           ity of



     unit/gram      00:00:                               area(s)           [place]



     ointment      00                                 daily.           Medical



                                                                 Branch

 

     collagenase      -0      Yes       [id number]           Apply to         

  [facility]



     250       5-17                               affected           ity of



     unit/gram      00:00:                               area(s)           [place]



     ointment      00                                 daily.           Medical



                                                                 Branch

 

     collagenase      -0      Yes       [id number]           Apply to         

  Univers



     250       5-17                               affected           ity of



     unit/gram      00:00:                               area(s)           [place]



     ointment      00                                 daily.           Medical



                                                                 Branch

 

     collagenase      -0      Yes       [id number]           Apply to         

  [facility]



     250       5-17                               affected           ity of



     unit/gram      00:00:                               area(s)           [place]



     ointment      00                                 daily.           Medical



                                                                 Branch

 

     collagenase      -0      Yes       [id number]           Apply to         

  [facility]



     250       5-17                               affected           ity of



     unit/gram      00:00:                               area(s)           [place]



     ointment      00                                 daily.           Medical



                                                                 Branch

 

     collagenase            Yes       [id number]           Apply to         

  Univers



     250       5-17                               affected           ity of



     unit/gram      00:00:                               area(s)           [place]



     ointment      00                                 daily.           Medical



                                                                 Branch

 

     collagenase      0      Yes       [id number]           Apply to         

  Univers



     250       5-17                               affected           ity of



     unit/gram      00:00:                               area(s)           [place]



     ointment      00                                 daily.           Medical



                                                                 Branch

 

     collagenase      0      Yes       [id number]           Apply to         

  Univers



     250       5-17                               affected           ity of



     unit/gram      00:00:                               area(s)           [place]



     ointment      00                                 daily.           Medical



                                                                 Branch

 

     collagenase            Yes       [id number]           Apply to         

  [facility]



     250       5-17                               affected           ity of



     unit/gram      00:00:                               area(s)           [place]



     ointment      00                                 daily.           Medical



                                                                 Branch

 

     collagenase      0      Yes       [id number]           Apply to         

  [facility]



     250       5-17                               affected           ity of



     unit/gram      00:00:                               area(s)           [place]



     ointment      00                                 daily.           Medical



                                                                 Branch

 

     collagenase      0      Yes       [id number]           Apply to         

  [facility]



     250       5-17                               affected           ity of



     unit/gram      00:00:                               area(s)           [place]



     ointment      00                                 daily.           Medical



                                                                 Branch

 

     collagenase            Yes       [id number]           Apply to         

  Univers



     250       5-17                               affected           ity of



     unit/gram      00:00:                               area(s)           [place]



     ointment      00                                 daily.           Medical



                                                                 Branch

 

     collagenase            Yes       [id number]           [facility] to         

  [facility]



     250       5-17                               affected           ity of



     unit/gram      00:00:                               area(s)           [place]



     ointment      00                                 daily.           Medical



                                                                 [facility]

 

     collagenase      0      Yes       [id number]           Apply to         

  [facility]



     250       5-17                               affected           ity of



     unit/gram      00:00:                               area(s)           [place]



     ointment      00                                 daily.           Medical



                                                                 Branch

 

     collagenase      0      Yes       [id number]           Apply to         

  [facility]



     250       5-17                               affected           ity of



     unit/gram      00:00:                               area(s)           [place]



     ointment      00                                 daily.           Medical



                                                                 Branch

 

     docusate      -2022- No        [id number] 100mg      Take 1           [facility]

[facility]



     100 mg                                capsule by           ity of



     capsule      00:00: 04:59                          mouth 2           [place]



               00   :00                           (two)           Medical



                                                  times           [facility]anch



                                                  daily for           



                                                  30 days.           

 

     lactobacill      2022- No        [id number] .5mg      Take 1          

 [facility]



     us        5-17 06-17                          tablet by           ity of



     acidophilus      00:00: 04:59                          mouth 2           [place]



               00   :00                           (two)           [facility]



                                                  times           [facility]



                                                  daily for           



                                                  30 days.           

 

     sennosides      2022- No        [id number] 8.6mg      Take 1          

 [facility]



     8.6 mg      5-17 06-17                          tablet by           ity of



     tablet      00:00: 04:59                          mouth 2           [place]



               00   :00                           (two)           Medical



                                                  times           [facility]



                                                  daily for           



                                                  30 days.           

 

     tamsulosin      2022- No        [id number] .4mg      Take 1           

[facility]



     0.4 mg 24      5-17 06-17                          capsule by           ity

 of



     hr capsule      00:00: 04:59                          mouth           [place]



               00   :00                           daily for           Medical



                                                  30 days.           Branch

 

     docusate      2022- No        [id number] 100mg      Take 1           U

n[facility]



     100 mg      5-17 06-17                          capsule by           ity of



     capsule      00:00: 04:59                          mouth 2           [place]



               00   :00                           (two)           Medical



                                                  times           [facility]



                                                  daily for           



                                                  30 days.           

 

     lactobacill      2022- No        [id number] .5mg      Take 1          

 [facility]



     us        5-17 06-17                          tablet by           ity of



     acidophilus      00:00: 04:59                          mouth 2           [facility]

[place]



               00   :00                           (two)           [facility]



                                                  times           [facility]



                                                  daily for           



                                                  30 days.           

 

     sennosides      2022- No        [id number] 8.6mg      Take 1          

 [facility]



     8.6 mg      5-17 06-17                          tablet by           ity of



     tablet      00:00: 04:59                          mouth 2           [place]



               00   :00                           (two)           Medical



                                                  times           [facility]



                                                  daily for           



                                                  30 days.           

 

     tamsulosin      2022- No        [id number] .4mg      Take 1           

[facility]



     0.4 mg 24      5-17 06-17                          capsule by           ity

 of



     hr capsule      00:00: 04:59                          mouth           [place]



               00   :00                           daily for           Medical



                                                  30 days.           Branch

 

     docusate      2022- No        [id number] 100mg      Take 1           U

n[facility]



     100 mg      5-17 06-17                          capsule by           ity of



     capsule      00:00: 04:59                          mouth 2           [place]



               00   :00                           (two)           Medical



                                                  times           Branch



                                                  daily for           



                                                  30 days.           

 

     lactobacill      2022- No        [id number] .5mg      Take 1          

 [facility]



     us        5-17 06-17                          tablet by           ity of



     acidophilus      00:00: 04:59                          mouth 2           [facility]

[place]



               00   :00                           (two)           Medical



                                                  times           [facility]



                                                  daily for           



                                                  30 days.           

 

     sennosides      2022- No        [id number] 8.6mg      Take 1          

 [facility]



     8.6 mg      5-17 06-17                          tablet by           ity of



     tablet      00:00: 04:59                          mouth 2           [place]



               00   :00                           (two)           Medical



                                                  times           [facility]



                                                  daily for           



                                                  30 days.           

 

     tamsulosin      2022- No        [id number] .4mg      Take 1           

[facility]



     0.4 mg 24      5-17 06-17                          capsule by           ity

 of



     hr capsule      00:00: 04:59                          mouth           [place]



               00   :00                           daily for           Medical



                                                  30 days.           Branch

 

     docusate      2022- No        [id number] 100mg      Take 1           [facility]

n[facility]



     100 mg      5-17 06-17                          capsule by           ity of



     capsule      00:00: 04:59                          mouth 2           [place]



               00   :00                           (two)           Medical



                                                  times           [facility]



                                                  daily for           



                                                  30 days.           

 

     lactobacill      2022- No        [id number] .5mg      Take 1          

 [facility]



     us        5-17 06-17                          tablet by           ity of



     acidophilus      00:00: 04:59                          mouth 2           [facility]

[place]



               00   :00                           (two)           Medical



                                                  times           [facility]



                                                  daily for           



                                                  30 days.           

 

     sennosides      2022- No        [id number] 8.6mg      Take 1          

 [facility]



     8.6 mg      5-17 06-17                          tablet by           ity of



     tablet      00:00: 04:59                          mouth 2           [place]



               00   :00                           (two)           Medical



                                                  times           [facility]



                                                  daily for           



                                                  30 days.           

 

     tamsulosin      2022- No        [id number] .4mg      Take 1           

[facility]



     0.4 mg 24      5-17 06-17                          capsule by           ity

 of



     hr capsule      00:00: 04:59                          mouth           [place]



               00   :00                           daily for           Medical



                                                  30 days.           Branch

 

     docusate      2022- No        [id number] 100mg      Take 1           [facility]

n[facility]



     100 mg      5-17 06-17                          capsule by           ity of



     capsule      00:00: 04:59                          mouth 2           [place]



               00   :00                           (two)           Medical



                                                  times           [facility]



                                                  daily for           



                                                  30 days.           

 

     lactobacill      2022- No        [id number] .5mg      Take 1          

 [facility]



     us        5-17 06-17                          tablet by           ity of



     acidophilus      00:00: 04:59                          mouth 2           [facility]

[place]



               00   :00                           (two)           Medical



                                                  times           [facility]



                                                  daily for           



                                                  30 days.           

 

     sennosides      2022- No        [id number] 8.6mg      Take 1          

 [facility]



     8.6 mg      -17                          tablet by           ity of



     tablet      00:00: 04:59                          mouth 2           [place]



               00   :00                           (two)           Medical



                                                  times           [facility]



                                                  daily for           



                                                  30 days.           

 

     tamsulosin      2022- No        [id number] .4mg      Take 1           

[facility]



     0.4 mg 24      -17                          capsule by           ity

 of



     hr capsule      00:00: 04:59                          mouth           [place]



               00   :00                           daily for           Medical



                                                  30 days.           Branch

 

     metroNIDAZO      2022- No        [id number] 500mg      Take 1         

  [facility]



      mg      5-17 -21                          tablet by           ity 

of



     tablet      00:00: 04:59                          mouth           [place]



               00   :00                           every 12           Medical



                                                  (twelve)           [facility]anch



                                                  hours for           



                                                  3 days.           

 

     metroNIDAZO      2022- No        [id number] 500mg      Take 1         

  [facility]



      mg      --                          tablet by           ity 

of



     tablet      00:00: 04:59                          mouth           [place]



               00   :00                           every 12           Medical



                                                  (twelve)           [facility]anch



                                                  hours for           



                                                  3 days.           

 

     metroNIDAZO      2022- No             500mg      500 mg,           U

n[facility]



     LE (FLAGYL)      5-15 05-20                          Oral, Q12H           i

ty of



     tablet 500      22:00: 21:59                          ABX, 10           [facility]

[facility]



     mg        00   :00                           doses,           Medical



                                                  First dose           [facility]



                                                  on Sun           



                                                  5/15/22 at           



                                                  1700, Last           



                                                  dose on           



                                                  22 at           



                                                  0500,           



                                                  Routine<br           



                                                  >Reason           



                                                  for            



                                                  Anti-Infec           



                                                  tive:           



                                                  Documented           



                                                  Infection<           



                                                  br>Documen           



                                                  huma            



                                                  Infection           



                                                  Site: Skin           



                                                  / Soft           



                                                  Tissue<br>           



                                                  Duration           



                                                  of             



                                                  Therapy:           



                                                  Other (see           



                                                  Comments)           

 

     cefTRIAXone      2022- No             2g        2 g, IV           [facility]

[facility]



     (ROCEPHIN)      5-15 05-17                          Piggyback,           it

y of



     2 g in NaCl      20:00: 20:03                          Q24H ABX,           

[place]



     0.9% (NS)      00   :21                           3 doses,           [facility]

l



     100 mL                                         First dose           [facility]



     MINI-BAG                                         on Sun           



                                                  5/15/22 at           



                                                  1500, Last           



                                                  dose on           



                                                  22 at           



                                                  1500,           



                                                  Administer           



                                                  over 30           



                                                  Minutes,           



                                                  100            



                                                  mL<br>Reas           



                                                  on for           



                                                  Anti-Infec           



                                                  tive:           



                                                  Documented           



                                                  Infection<           



                                                  br>Documen           



                                                  huma            



                                                  Infection           



                                                  Site: Skin           



                                                  / Soft           



                                                  Tissue<br>           



                                                  Duration           



                                                  of             



                                                  Therapy:           



                                                  Other (see           



                                                  Comments)           

 

     enoxaparin            Yes            30mg      30 mg,           Unive

rs



     (LOVENOX)                                     Subcutaneo           ity 

of



     injection      01:00:                               us, Q12H,           [facility]

[facility]



     30 mg      00                                 First dose           Medical



                                                  on Wed           Branch



                                                  22 at           



                                                  2000,           



                                                  Until           



                                                  Discontinu           



                                                  ed,            



                                                  Routine           

 

     proMETHazin            Yes            25mg      25 mg, IV           U

nivers



     e                                        Piggyback,           ity of



     (PHENERGAN)      22:58:                               Q4HPRN,           [facility]

as



     25 mg in      13                                 Starting           Medical



     NaCl 0.9%                                         on 



     (NS) 50 mL                                         22 at           



     IV                                           1758,           



     piggyback                                         Until           



                                                  Discontinu           



                                                  ed,            



                                                  Routine,           



                                                  Nausea and           



                                                  Vomiting           



                                                  (N/V)           

 

     collagenase            Yes                      Topical           Uni

vers



     (SANTYL)                                     (Apply To           ity of



     ointment      22:15:                               Affected           [place]



               00                                 Areas),           Medical



                                                  DAILY,           [facility]anch



                                                  First dose           



                                                  on 22 at           



                                                  1715,           



                                                  Until           



                                                  Discontinu           



                                                  ed,            



                                                  Routine           

 

     HYDROmorpho      2022- No             1mg       1 mg, Slow          

 [facility]



     ne                                  IV Push,           ity of



     (DILAUDID)      21:45: 21:25                          ONCE, 1           [facility]

as



     injection 1      00   :00                           dose, On           Medi

[facility]



     mg                                           22 at           



                                                  1645,           



                                                  Routine<br           



                                                  >Use           



                                                  approved           



                                                  by             



                                                  (Faculty):           



                                                  ADC            



                                                  PROVIDER           

 

     magnesium      2022- No             2g        2 g, IV           Un[facility]

ers



     sulfate in                                Piggyback,           it

y of



     water 2      15:15: 16:11                          Administer           [facility]

as



     gram/50 mL      00   :00                           over 60           Med[facility]

l



     (4 %)                                         Minutes,           [facility]



     infusion 2                                         ONCE, 1           



     g                                            dose, On           



                                                  22 at           



                                                  1015,           



                                                  Routine           

 

     lactulose            Yes            15mL      15 mL,           [facility]

s



     (CEPHULAC)                                     Oral,           ity of



     solution 15      14:00:                               DAILY,           [place]

[place]



     mL        00                                 First dose           Medical



                                                  on Wed           [facility]



                                                  22 at           



                                                  0900,           



                                                  Until           



                                                  Discontinu           



                                                  ed,            



                                                  Routine           

 

     vancomycin      2022- No             125mg      125 mg,           [facility]

[facility]



     (FIRVANQ)                                Oral,           ity of



     50 mg/mL      14:00: 16:27                          Q24H,           [place]



     oral      00   :02                           First dose           Medical



     solution                                         on Wed           [facility]anch



     125 mg                                         22 at           



                                                  0900,           



                                                  Until           



                                                  Discontinu           



                                                  ed,            



                                                  Routine<br           



                                                  >Reason           



                                                  for            



                                                  Anti-Infec           



                                                  tive:           



                                                  Empiric           



                                                  Non-Surgic           



                                                  al             



                                                  Prophylaxi           



                                                  s<br>Durat           



                                                  ion of           



                                                  therapy: 7           



                                                  days           

 

     sennosides      0      Yes            8.6mg      8.6 mg,           Uni

vers



     (SENOKOT)                                     Oral, BID,           ity 

of



     tablet 8.6      13:00:                               First dose           [facility]

[facility]



     mg        00                                 on Wed           [facility]



                                                  22 at           [facility]



                                                  0800,           



                                                  Until           



                                                  Discontinu           



                                                  ed,            



                                                  Routine           

 

     docusate            Yes            100mg      100 mg,           Unive

rs



     (COLACE)                                     Oral, BID,           ity o

f



     capsule 100      13:00:                               First dose           

[place]



     mg        00                                 on Wed           Medical



                                                  22 at           [facility]



                                                  0800,           



                                                  Until           



                                                  Discontinu           



                                                  ed,            



                                                  Routine           

 

     HYDROmorpho            Yes            2mg       2 mg,           Unive

rs



     ne                                       Oral, TID,           ity of



     (DILAUDID)      13:00:                               First dose           T

[facility]



     tablet 2 mg      00                                 on Wed           [facility]

l



                                                  22 at           [facility]



                                                  0800,           



                                                  Until           



                                                  Discontinu           



                                                  ed             

 

     lactobacill            Yes            .5mg      0.5 mg,           Uni

vers



     us                                       Oral, BID,           ity of



     acidophilus      13:00:                               First dose           

[place]



     tablet 0.5      00                                 on Wed           Medical



     mg                                           22 at           [facility]



                                                  0800,           



                                                  Until           



                                                  Discontinu           



                                                  ed,            



                                                  Routine           

 

     ceFEPIme      2022- No             2g        2 g, IV           Unive

rs



     (MAXIPIME)       05-15                          Piggyback,           it

y of



     2 g in NaCl      11:00: 19:23                          Q8H ABX,           T

[facility]



     0.9% (NS)      00   :40                           First dose           Medi

[facility]



     100 mL                                         on Wed           [facility]



     MINI-BAG                                         22 at           



                                                  0600,           



                                                  Until           



                                                  Discontinu           



                                                  ed,            



                                                  Administer           



                                                  over 30           



                                                  Minutes,           



                                                  100            



                                                  mL<br>Reas           



                                                  on for           



                                                  Anti-Infec           



                                                  tive:           



                                                  [facility]           



                                                  Therapy           



                                                  for            



                                                  Suspected           



                                                  Infection<           



                                                  br&gt;Empi           



                                                  alda            



                                                  Therapy           



                                                  Site:           



                                                  Bone<br>Du           



                                                  ration of           



                                                  therapy:           



                                                  72 hours           

 

     NaCl 0.9%      2022- No             1000mL      at 50           Univ

ers



     (NS) IV      -16                          mL/hr, IV           ity of



     infusion      10:00: 16:41                          Infusion,           Eric

as



     1,000 mL      00   :28                           CONTINUOUS           Medic

al



                                                  , Starting           [facility]



                                                  on 22 at           



                                                  0500,           



                                                  Until Mon           



                                                  22 at           



                                                  1141,           



                                                  Routine           

 

     doxycycline       No             100mg      100 mg, IV         

  [facility]



     (VIBRAMYCIN                                Piggyback,           i

ty of



     ) 100 mg in      09:15: 23:11                          Q12H ABX,           

[place]



     NaCl 0.9%      00   :48                           First dose           Medi

[facility]



     (NS) 100 mL                                         on Wed           [facility]



     MINI-BAG                                         22 at           



                                                  0415,           



                                                  Until           



                                                  Discontinu           



                                                  ed,            



                                                  Administer           



                                                  over 60           



                                                  Minutes,           



                                                  100            



                                                  mL<br>Reas           



                                                  on for           



                                                  Anti-Infec           



                                                  tive:           



                                                  [facility]           



                                                  Therapy           



                                                  for            



                                                  Suspected           



                                                  Infection<           



                                                  br>[facility]           



                                                  Therapy           



                                                  Site:           



                                                  Wound<br>D           



                                                  uration of           



                                                  therapy: 7           



                                                  days           

 

     tamsulosin            Yes            .4mg      0.4 mg,           Univ

ers



     (FLOMAX)                                     Oral,           ity of



     capsule 0.4      08:30:                               DAILY,           [place]

[place]



     mg        00                                 First dose           Medical



                                                  on Wed           [facility]



                                                  22 at           



                                                  0330,           



                                                  Until           



                                                  Discontinu           



                                                  ed,            



                                                  Routine           

 

     HYDROmorpho            Yes            1mg       1 mg, Slow           

[facility]



     ne                                       IV Push,           ity of



     (DILAUDID)      06:04:                               Q4HPRN,           [place]

[facility]



     injection 1      07                                 Starting           Medi

[facility]



     mg                                           on Wed           [facility]



                                                  22 at           



                                                  0104,           



                                                  Until           



                                                  Discontinu           



                                                  ed,            



                                                  Routine,           



                                                  Pain           



                                                  (scale           



                                                  7-10)<br>U           



                                                  se             



                                                  approved           



                                                  by             



                                                  (Faculty):           



                                                  ADC            



                                                  PROVIDER           

 

     FENTanyl PF       No             50ug      50 mcg,           Un

[facility]



     (SUBLIMAZE                                Slow IV           ity o

f



     (PF))      05:30: 05:01                          Push,           [place]



     injection      00   :00                           ONCE, 1           Medical



     50 mcg                                         dose, On           [facility]



                                                  22 at           



                                                  0030, STAT           

 

     iopamidol      2022- No        [id number] 150mL      150 mL,         

  [facility]



     (ISOVUE                                Intravenou           ity o

f



     370-500 mL)      04:45: 03:34                          s, ONCE, 1          

 [place]



     injection      00   :00                           dose, On           Medica

l



     150 mL                                         Tue            [facility]



                                                  5/10/22 at           



                                                  2345,           



                                                  Routine           

 

     ceFEPIme      2022- No             2000mg      2,000 mg,           U

nivers



     (MAXIPIME)                                IV             ity of



     injection      04:15: 04:15                          Piggyback,           T

[facility]



     2,000 mg      00   :00                           ONCE, 1           Medical



                                                  dose, On           [place]



                                                  Tue            



                                                  5/10/22 at           



                                                  2315,           



                                                  STAT<br>Re           



                                                  ason for           



                                                  Anti-Infec           



                                                  tive:           



                                                  [facility]           



                                                  Therapy           



                                                  for            



                                                  Suspected           



                                                  Infection<           



                                                  br>[facility]           



                                                  Therapy           



                                                  Site:           



                                                  Bone<br>Du           



                                                  ration of           



                                                  therapy:           



                                                  72 hours           

 

     FENTanyl PF      2022- No             75ug      75 mcg,           Un

[facility]



     (SUBLIMAZE                                Slow IV           ity o

f



     (PF))      03:45: 02:47                          Push,           [place]



     injection      00   :00                           ONCE, 1           Medical



     75 mcg                                         dose, On           [facility]            



                                                  5/10/22 at           



                                                  2245, STAT           

 

     OXYCODONE      2022- No                       Take by           [facility]

ers



     HCL/ACETAMI                                mouth.           ity o

f



     NOPHEN      03:22: 00:00                                         [place]



     (PERCOCET      50   :00                                          Medical



     ORAL)                                                        [place]

 

     FENTanyl PF      2022- No             100ug      100 mcg,           

[facility]



     (SUBLIMAZE                                Slow IV           ity o

f



     (PF))      01:30: 01:12                          Push,           [place]



     injection      00   :00                           ONCE, 1           Medical



     100 mcg                                         dose, On           [place]



                                                  Tue            



                                                  5/10/22 at           



                                                  2030, STAT           

 

     Lovenox            No                       Notes:           [facility]



               4-13                               (Same as:           l



               17:00:                               Lovenox)           [place]



                                                               

 

     promethazin            No                       Notes:           [facility]

[facility]



     e         4-13                               (Same as:           l



               16:47:                               Phenergan)           [place]



                                                               

 

     albuterol            No                       Notes: SEE           Me

moria



     0.083%      4-13                               RT             l



     inhalation      16:46:                               DOCUMENTAT           H

[facility]



     solution      00                                 ION (Same           



                                                  as:            



                                                  Proventil)           

 

     hydromorpho            Yes                      4 mg = 1           Me

moria



     ne 4 mg      4-13                               tab, PO,           l



     oral tablet      16:43:                               Q12H, 0           Her

child



               00                                 Refill(s)           

 

     Lantus 100            No                       10 unit,           Mem

oria



     units/mL      4-13                               SUB-Q,           l



               16:40:                               Daily, 0           [place]                                 Refill(s)           

 

     Lantus 100            No                       10 unit,           Mem

oria



     units/mL      4-12                               0.1 mL,           l



               14:00:                               Route:                                            SUB-Q,           



                                                  Drug form:           



                                                  SOLN,           



                                                  Daily,           



                                                  Dosing           



                                                  Weight           



                                                  127.727,           



                                                  kg, Start           



                                                  date:           



                                                  22           



                                                  9:00:00           



                                                  CDT,           



                                                  Duration:           



                                                  30 day,           



                                                  Stop date:           



                                                  22           



                                                  9:00:00           



                                                  CDT,           



                                                  Infuse           



                                                  over: 0           



                                                  hr, 0           

 

     cefepime +            No                       Notes:           [facility]

ia



     sterile      4-12                               (Same As:           l



     water 10 mL      06:00:                               Maxipime)           H

                                 ***            



                                                  MEDICATION           



                                                  WASTE ***           



                                                  Product           



                                                  Size: 1000           



                                                  mg Product           



                                                  Wasted:           



                                                  _0__ mg           

 

     cefepime +            No                       Notes:           [facility]

ia



     sterile      4-12                               (Same As:           l



     water 10 mL      05:00:                               Maxipime)                                            ***            



                                                  MEDICATION           



                                                  WASTE ***           



                                                  Product           



                                                  Size: 1000           



                                                  mg Product           



                                                  Wasted:           



                                                  _0__ mg           

 

     insulin            No                       Notes:           [facility]



     lispro      4-12                               (Same as:           l



               05:00:                               Humalog)                                            Roll in           



                                                  palms of           



                                                  hands           



                                                  gently; Do           



                                                  not shake           



                                                  vigorously           



                                                  . WASTE:           



                                                  F/P -           



                                                  Black; E -           



                                                  Municipal           



                                                  Trash Bin           



                                                  Stable for           



                                                  28 days at           



                                                  room           



                                                  temperatur           



                                                  e. Expires           



                                                  in _____           



                                                  days from           



                                                  __________           



                                                  ____Date           

 

     Dilaudid            No                       Notes:           [facility]



               4-11                               (Same as:           l



               16:53:                               Dilaudid)                                                           

 

     Lantus 100            No                       10 unit,           Mem

oria



     units/mL      4-11                               0.1 mL,           l



               16:24:                               Route:           [place]



                                                SUB-Q,           



                                                  Drug form:           



                                                  SOLN,           



                                                  ONCE,           



                                                  Dosing           



                                                  Weight           



                                                  127.727,           



                                                  kg, Start           



                                                  date:           



                                                  22           



                                                  11:24:00           



                                                  CDT, Stop           



                                                  date:           



                                                  22           



                                                  11:24:00           



                                                  CDT,           



                                                  Infuse           



                                                  over: 0           



                                                  hr, 0           

 

     NS (Bolus)            No                       500 mL,           [facility]

[facility]je[facility]



     IV        4-11                               500 ml/hr,           l



               15:15:                               Infuse                                            Over: 1           



                                                  hr, Route:           



                                                  IV, 500,           



                                                  Drug form:           



                                                  INJ, ONCE,           



                                                  Priority:           



                                                  STAT,           



                                                  Dosing           



                                                  Weight           



                                                  127.727           



                                                  kg, Start           



                                                  date:           



                                                  22           



                                                  10:15:00           



                                                  CDT, Stop           



                                                  date:           



                                                  22           



                                                  10:15:00           



                                                  CDT, 0           

 

     Dextrose      -      No                       12.5 gm,           [facility]

ia



     50% Syringe      4-11                               25 mL,           l



     (D50W)      13:59:                               Route:           [facility]                                 IVP, Drug           



                                                  Form: INJ,           



                                                  Dosing           



                                                  Weight           



                                                  127.727,           



                                                  kg, PRN,           



                                                  PRN Blood           



                                                  Glucose           



                                                  Results,           



                                                  Start           



                                                  date:           



                                                  22           



                                                  8:59:00           



                                                  CDT,           



                                                  Duration:           



                                                  30 day,           



                                                  Stop date:           



                                                  22           



                                                  8:58:00           



                                                  CDT, 0           

 

     glucagon            No                       1 mg,           [facility]



               4-11                               Route: IM,           l



               13:59:                               Drug form:           [place]                                 PDR/INJ,           



                                                  PRN,           



                                                  Dosing           



                                                  Weight           



                                                  127.727,           



                                                  kg, PRN           



                                                  Blood           



                                                  Glucose           



                                                  Results,           



                                                  Start           



                                                  date:           



                                                  22           



                                                  8:59:00           



                                                  CDT,           



                                                  Duration:           



                                                  30 day,           



                                                  Stop date:           



                                                  22           



                                                  8:58:00           



                                                  CDT, 0           

 

     insulin            No                       Notes:           [facility]



     lispro      4-11                               (Same as:           l



               13:59:                               Humalog)                                            Roll in           



                                                  palms of           



                                                  hands           



                                                  gently; Do           



                                                  not shake           



                                                  vigorously           



                                                  . WASTE:           



                                                  F/P -           



                                                  Black; E -           



                                                  Municipal           



                                                  Trash Bin           



                                                  Stable for           



                                                  28 days at           



                                                  room           



                                                  temperatur           



                                                  e. Expires           



                                                  in _____           



                                                  days from           



                                                  __________           



                                                  ____Date           

 

     Lyrica            No                       Notes:           [facility]



               4-11                               (Same as:           l



               02:00:                               Lyrica)           [facility]



               00                                                

 

     valproic            No                       Notes:           [facility]



     acid 100      4-10                               Dilute in           l



     mg/mL      17:00:                               at least           [facility]



     intravenous      00                                 50ml D5W           



     solution +                                         or NS.           



     Sodium                                         Infusion           



     Chloride                                         rate = 20           



     0.9% IV 50                                         mg/min           



     mL                                           (Same As:           



                                                  [place])           



                                                  Hazardous           



                                                  Drug Group           



                                                  3:Reproduc           



                                                  tive risk           



                                                  Hazardous           



                                                  Drug --           



                                                  Refer to           



                                                  safe           



                                                  handling           



                                                  procedure           



                                                  PPE Matrix           

 

     Solu-MEDROL            No                       Notes:           [facility]

rajeev



               4-10                               (Same           l



               16:43:                               as:Solu-ME           [place]



               00                                 DROL,           



                                                  A-Methapre           



                                                  d) ***           



                                                  MEDICATION           



                                                  WASTE ***           



                                                  Product           



                                                  Size: 1000           



                                                  mg Product           



                                                  Wasted:           



                                                  _0__ mg           

 

     magnesium            No                       Notes:           [facility]

a



     sulfate      10                               WASTE: F/P           l



               16:43:                               - Sink; E                                            -              



                                                  Municipal           



                                                  Trash Bin           

 

     amitriptyli            No                       Notes:           [facility]

[facility]



     ne        4-10                               (Same as:           l



               02:00:                               Elavil)                                                           

 

     SUMAtriptan            No                       Notes:           [facility]

rajeev



               -09                               (Same As:           l



               18:45:                               Imitrex)                                                           

 

     promethazin            No                       6.25 mg,           [facility]

[facility]



     e                                        Route:           l



               18:44:                               IVPB,                                            Q12H,           



                                                  Dosing           



                                                  Weight           



                                                  127.727,           



                                                  kg, PRN           



                                                  Nausea &           



                                                  Vomiting,           



                                                  Start           



                                                  date:           



                                                  22           



                                                  13:44:00           



                                                  CDT,           



                                                  Duration:           



                                                  30 day,           



                                                  Stop date:           



                                                  22           



                                                  13:43:00           



                                                  CDT            

 

     Benadryl            No                       Notes:           [facility]



               4-09                               (Same as:           l



               18:37:                               Benadryl)                                                           

 

     Dilaudid            No                       Notes:           [facility]



               4-09                               (Same as:           l



               18:36:                               Dilaudid)                                                           

 

     Phenergan            No                       Notes:           [facility]

a



               4-09                               (Same as:           l



               18:33:                               Phenergan)                                            6.25 mg =           



                                                  1/2 x 12.5           



                                                  mg TAB           

 

     ascorbic            No                       Notes:           [facility]



     acid      -09                               (Same as:           l



               14:00:                               Vitamin C)                                                           

 

     celecoxib            No                       Notes:           [facility]

a



               4-09                               NSAID.           l



               14:00:                               Please           [facility]



               00                                 check           



                                                  indication           



                                                  . Not for           



                                                  seizure.           



                                                  (Same As:           



                                                  CeleBREX)           

 

     Lidoderm 5%            No                       Notes:           [facility]

ra[facility]



     topical      09                               Apply only           l



     film      14:00:                               once for           [facility]



     (patch)      00                                 up to 12           



                                                  hours in a           



                                                  24-hour           



                                                  period (12           



                                                  hours on           



                                                  and 12           



                                                  hours           



                                                  off).           



                                                  (Same as:           



                                                  Aspercreme           



                                                  Lidocaine           



                                                  Patch)           



                                                  "Remove           



                                                  old patch           



                                                  before           



                                                  applicatio           



                                                  n of new           



                                                  patch"           

 

     zinc            No                       Notes:           [facility]



     sulfate      -                               (Zinc           l



               14:00:                               sulfate           [place]



                                                capsule) -           



                                                  220 mg           



                                                  Zinc           



                                                  sulfate =           



                                                  50 mg           



                                                  elemental           



                                                  zinc Same           



                                                  as Zinc           



                                                  Sulfate           

 

     cefepime +            No                       Notes:           [facility]

ia



     sterile                                     (Same As:           l



     water 10 mL      12:00:                               Maxipime)           H

erm                                 ***            



                                                  MEDICATION           



                                                  WASTE ***           



                                                  Product           



                                                  Size: 1000           



                                                  mg Product           



                                                  Wasted:           



                                                  _0__ mg           

 

     hydromorpho            No                       Notes:           [facility]

ra[facility]ev



     ne                                       (Same as:           l



               09:39:                               Dilaudid)           [facility]



                                                               

 

     methocarbam            No                       Notes:           [facility]

[facility]



     ol                                       (Same           l



               05:00:                               as:Robaxin           [facility]                                 )              

 

     docusate            No                       Notes:           [facility]



                                              (Same as:           l



               02:00:                               Colace)           [facility]



                                                (Do Not           



                                                  Crush)           

 

     senna            No                       Notes:           [facility]



                                              (Same as:           l



               02:00:                               Senokot)           [facility]                                                

 

     Saline            No                       Notes:           [facility]



     Flush 0.9%                                     (Same as:           l



               02:00:                               BD             [place]



                                                Posiflush)           

 

     topiramate            No                       Notes:           [facility]

ia



                                              (Same As:           l



               02:00:                               Topamax)           [place]                                 "Do Not           



                                                  Crush"           



                                                  Hazardous           



                                                  Drug Group           



                                                  3:Reproduc           



                                                  tive risk           



                                                  Hazardous           



                                                  Drug --           



                                                  Refer to           



                                                  safe           



                                                  handling           



                                                  procedure           



                                                  PPE Matrix           

 

     remove            No                       Notes:           [facility]



     patch                                     Remove           l



               02:00:                               patch 12           [facility]



               00                                 hours           



                                                  after           



                                                  applicatio           



                                                  n each           



                                                  day.           

 

     acetaminoph            No                       Notes: Max           

[facility]



     en                                       acetaminop           l



               01:00:                               hen 4000           [place]



               00                                 mg/day (4           



                                                  gm/day).           



                                                  (Same as:           



                                                  Tylenol           



                                                  Extra           



                                                  Strength)           

 

     oxyCODONE            No                       Notes:           [facility]

a



     immediate                                     (Same as:           l



     release      00:09:                               Roxicodone           [facility]

jorge



               00                                 )              

 

     acetaminoph            No                       Notes: Do           [facility]

[facility]



     en-hydrocod                                     not exceed           l



     one 325      00:09:                               4gm/day of           [facility]

jorge



     mg-10 mg      00                                 acetaminop           



     oral tablet                                         hen. (Same           



                                                  as: Norco           



                                                  325/10)           

 

     LORazepam            No                       Notes:           [facility]

a



                                              (Same as:           l



               00:06:                               Ativan)           [place]                                                

 

     oxyCODONE            No                       Notes:           [facility]

a



     immediate      4-09                               (Same as:           l



     release      00:06:                               Roxicodone                                            )              

 

     Sodium            No                       1,000 mL,           [facility]

a



     Chloride      -08                               Rate: 75           l



     0.9% IV      23:43:                               ml/hr,           [place]



     1,000 mL      00                                 Infuse           



                                                  over: 13.3           



                                                  hr, Route:           



                                                  IV, Dosing           



                                                  Weight           



                                                  127.727           



                                                  kg, Total           



                                                  Volume:           



                                                  1,000,           



                                                  Start           



                                                  date:           



                                                  22           



                                                  18:43:00           



                                                  CDT,           



                                                  Duration:           



                                                  30 day,           



                                                  Stop date:           



                                                  22           



                                                  18:42:00           



                                                  CDT, BSA:           



                                                  2.37 m2, 0           

 

     acetaminoph            No                       Notes: Do           [facility]

[facility]



     en        4-08                               not exceed           l



               23:43:                               4 gm/day.           [place]



                                                (Same as:           



                                                  Tylenol)           

 

     acetaminoph            No                       Notes:           [facility]

[facility]



     en-hydrocod      4-08                               (Same as:           l



     one 325      23:43:                               Norco           [place]



     mg-5 mg      00                                 325/5) Do           



     oral tablet                                         not exceed           



                                                  4gm/day of           



                                                  acetaminop           



                                                  hen.           

 

     acetaminoph            No                       Notes: Do           M

emoria



     en-hydrocod      4-08                               not exceed           l



     one 325      23:43:                               4gm/day of           Herm

jorge



     mg-10 mg      00                                 acetaminop           



     oral tablet                                         hen. (Same           



                                                  as: Norco           



                                                  325/10)           

 

     bisacodyl            No                       Notes:           [facility]

a



               4-08                               (Same As:           l



               23:43:                               Dulcolax,           [place]                                 Bisco-Lax)           

 

     hydrALAZINE            No                       Notes:           [facility]

rajeev



               4-08                               (Same as:           l



               23:43:                               Apresoline                                            ) Push           



                                                  over 5           



                                                  minutes           

 

     labetalol            No                       10 mg, 2           [facility]

rajeev



               4-08                               mL, Route:           l



               23:43:                               IVP, Drug                                            form: INJ,           



                                                  Q15Min,           



                                                  Dosing           



                                                  Weight           



                                                  127.727,           



                                                  kg, Start           



                                                  date:           



                                                  22           



                                                  18:43:00           



                                                  CDT,           



                                                  Duration:           



                                                  3 doses or           



                                                  times,           



                                                  Stop date:           



                                                  22           



                                                  19:13:00           



                                                  CDT, 0           

 

     Saline            No                       Notes:           [facility]



     Flush 0.9%      4-08                               (Same as:           l



               23:43:                               BD                                              Posiflush)           

 

     NaCl 0.9%                   1000mL      at 999           Uni

vers



     (NS) bolus                                mL/hr,           ity of



     infusion      05:00: 05:59                          1,000 mL,           Eric

as



     1,000 mL      00   :00                           IV             Medical



                                                  Piggyback,           Branch



                                                  ONCE, 1           



                                                  dose, On           



                                                  22           



                                                  at 0000,           



                                                  STAT           

 

     diphenhydrA       No             25mg      25 mg,           Uni

vers



     MINE                                Slow IV           ity of



     (BENADRYL)      05:00: 04:47                          Push,           [place]



     injection      00   :00                           ONCE, 1           Medical



     25 mg                                         dose, On           Branch



                                                  22           



                                                  at 0000,           



                                                  STAT           

 

     haloperidol       No             2.5mg      2.5 mg,           U

nivers



     lactate                                Intravenou           ity o

f



     (HALDOL)      05:00: 04:47                          s, ONCE, 1           Te

[place]



     injection      00   :00                           dose, On           [facility]

l



     2.5 mg                                         22           Branch



                                                  at 0000,           



                                                  STAT           

 

     topiramate            Yes                      25 mg = 1           Me

moria



     25 mg oral      3-29                               cap, PO,           l



     capsule      13:44:                               Q12H, # 60           [facility]

                                 cap, 0           



                                                  Refill(s),           



                                                  Pharmacy:           



                                                  [facility]           



                                                   #[id number],           



                                                  149.86,           



                                                  cm,            



                                                  22           



                                                  1:18:00           



                                                  CDT,           



                                                  Height,           



                                                  127.727,           



                                                  kg,            



                                                  22           



                                                  1:18:00           



                                                  CDT,           



                                                  Weight           

 

     topiramate            No                       Notes:           [facility]

ia



               3-28                               Hazardous           l



               22:05:                               Drug Group                                            3:Reproduc           



                                                  tive risk           



                                                  Hazardous           



                                                  Drug --           



                                                  Refer to           



                                                  safe           



                                                  handling           



                                                  procedure           



                                                  PPE Matrix           



                                                  Sprinkle           



                                                  formulatio           



                                                  n. (Same           



                                                  As:            



                                                  Topamax)           

 

           No                       Notes:           [facility]



     packet      3-28                               (Same as:           l



               21:30:                               Sonia                                            Unflavored           



                                                  )              



                                                  Administer           



                                                  ing SONIA           



                                                  orally:           



                                                  Mix into           



                                                  8-10 fl oz           



                                                  of room           



                                                  temperatur           



                                                  e juice,           



                                                  soda, or           



                                                  water.           



                                                  Also mixes           



                                                  well with           



                                                  warm           



                                                  beverages,           



                                                  like           



                                                  coffee or           



                                                  tea. Can           



                                                  be mixed           



                                                  with           



                                                  applesauce           



                                                  .              



                                                  Thoroughly           



                                                  stir for           



                                                  30-60           



                                                  seconds or           



                                                  until           



                                                  completely           



                                                  dissolved           

 

     Lovenox            No                       Notes:           [facility]



               3-27                               (Same as:           l



               16:00:                               Lovenox)           Jose                                                

 

     Magnesium            No                       Notes:           [facility]

a



     Sulfate      3-                               WASTE: F/P           l



               15:54:                               - Sink; E           [place]



               00                                 -              



                                                  Municipal           



                                                  Trash Bin           

 

     methylPREDN            No                       Notes:           [facility]

rajeev



     ISolone      3-                               (Same           l



     SODium      15:54:                               as:Solu-ME           [place]

nn



     SUCCinate      00                                 DROL,           



                                                  A-Methapre           



                                                  d) ***           



                                                  MEDICATION           



                                                  WASTE ***           



                                                  Product           



                                                  Size: 1000           



                                                  mg Product           



                                                  Wasted:           



                                                  ___ mg           

 

     Dilaudid            No                       Notes:           [facility]



               3-27                               (Same as:           l



               13:28:                               Dilaudid)           [place]



                                                               

 

     remove            No                       Notes:           [facility]



     patch      3-                               Remove           l



               02:00:                               patch 12           Jose



               00                                 hours           



                                                  after           



                                                  applicatio           



                                                  n each           



                                                  day.           

 

     Lyrica            No                       Notes:           [facility]



               3-26                               (Same as:           l



               21:58:                               Lyrica)                                                           

 

     Naproxen            No                       Notes:           [facility]



               3-26                               (Same as:           l



               21:58:                               Naprosyn)           Jose                                 Take with           



                                                  food.           

 

     Ascorbic            No                       Notes:           [facility]



     Acid      3-                               (Same as:           l



               14:00:                               Vitamin C)                                                           

 

     Zinc            No                       Notes:           [facility]



     Sulfate      3-                               (Zinc           l



               14:00:                               sulfate           Jose



                                                capsule) -           



                                                  220 mg           



                                                  Zinc           



                                                  sulfate =           



                                                  50 mg           



                                                  elemental           



                                                  zinc Same           



                                                  as Zinc           



                                                  Sulfate           

 

     multivitami            No                       Notes:           [facility]

rajeev



     n         3-26                               (Same           l



               14:00:                               as:Thera)                                            WASTE: F/P           



                                                  - Black; E           



                                                  -              



                                                  Municipal           



                                                  Trash Bin           



                                                  Take with           



                                                  food.           

 

     Lidocaine            No                       Notes:           [facility]

a



     0.05 MG/MG      3-26                               Apply only           l



     Transdermal      14:00:                               once for           He

rmann



     Patch      00                                 up to 12           



                                                  hours in a           



                                                  24-hour           



                                                  period (12           



                                                  hours on           



                                                  and 12           



                                                  hours           



                                                  off).           



                                                  (Same as:           



                                                  Aspercreme           



                                                  Lidocaine           



                                                  Patch)           



                                                  "Remove           



                                                  old patch           



                                                  before           



                                                  applicatio           



                                                  n of new           



                                                  patch"           

 

     Celebrex            No                       Notes:           [facility]



               3-26                               NSAID.           l



               03:15:                               Please           [place]



               00                                 check           



                                                  indication           



                                                  . Not for           



                                                  seizure.           



                                                  (Same As:           



                                                  CeleBREX)           

 

     Robaxin            No                       Notes:           [facility]



               3-26                               (Same           l



               03:15:                               as:Robaxin                                            )              

 

     gabapentin            No                       Notes:           [facility]

ia



               3-26                               (Same as:           l



               03:15:                               Neurontin)                                                           

 

     Tramadol            No                       Notes: Not           Mem

oria



               3-26                               to exceed           l



               03:15:                               400mg/day.                                            (Same As:           



                                                  Ultram)           

 

     Dexamethaso            No                       Notes: ***           

[facility]



     ne        3-26                               MEDICATION           l



               03:00:                               WASTE ***                                            Product           



                                                  Size: 10           



                                                  mg Product           



                                                  Wasted:           



                                                  ___ mg           

 

     Dilaudid            No                       Notes:           [facility]



               3-26                               Same as           l



               02:58:                               Dilaudid                                                           

 

     Acetaminoph            No                       Notes: Max           

[facility]



     en        3-25                               acetaminop           l



               22:38:                               hen =                                            4000mg/day           



                                                  (4             



                                                  gm/day).           



                                                  (Same as:           



                                                  Tylenol)           

 

     Isolyte S            No                       Notes:           [facility]

a



     PH-7.4      3-25                               (Same as:           l



     (Bolus) IV      22:37:                               Isolyte S           He

rm                                 PH7.4,           



                                                  Normosol-R           



                                                  PH 7.4,           



                                                  Plasma-Lyt           



                                                  e A )           

 

     Magnesium            No                       Notes:           [facility]

a



     Sulfate      3-25                               WASTE: F/P           l



               22:37:                               - Sink; E                                            -              



                                                  [facility]           



                                                  Trash Bin           

 

     Iohexol            No                       100 mL,           [facility]



               3-25                               Route:           l



               20:04:                               IVP, Drug                                            Form:           



                                                  SOLN,           



                                                  Dosing           



                                                  Weight           



                                                  127.727,           



                                                  kg,            



                                                  ONCALL,           



                                                  STAT,           



                                                  Start           



                                                  date:           



                                                  22           



                                                  15:04:00           



                                                  CDT,           



                                                  Duration:           



                                                  1 doses or           



                                                  times,           



                                                  Dose =           



                                                  2.2ml/kg,           



                                                  Max dose =           



                                                  100ml --           



                                                  "To be           



                                                  infused by           



                                                  Radiology           



                                                  Staff           



                                                  ONLY"           

 

     Docusate            No                       Notes:           [facility]



               3-25                               (Same as:           l



               14:00:                               Colace)                                            (Do Not           



                                                  Crush)           

 

     sennosides,            No                       Notes:           [facility]

rajeev



     USP       3-25                               (Same as:           l



               14:00:                               Senokot)                                                           

 

     Saline            No                       Notes:           [facility]



     Flush 0.9%      3-25                               preservati           l



               14:00:                               ve free.                                                           

 

     influenza            Yes                      Notes:           [facility]

a



     virus      3-25                               (Same as:           l



     vaccine,      06:10:                               Fluzone           [facility]uel

n



     inactivated      26                                 [place]           



                                                  nt,            



                                                  Fluarix           



                                                  [place]           



                                                  nt) For           



                                                  patients 6           



                                                  - 35           



                                                  months of           



                                                  age (0.5           



                                                  mL IM) For           



                                                  3 years of           



                                                  age and           



                                                  older (0.5           



                                                  mL IM)           



                                                  Shake well           



                                                  before use           

 

     Lorazepam            No                       Notes:           [facility]

a



               3-25                               (Same as:           l



               06:05:                               Ativan)                                                           

 

     Methocarbam            No                       Notes:           [facility]

[facility]



     ol        3-25                               (Same           l



               06:05:                               as:Robaxin                                            )              

 

     Sodium            No                       1,000 mL,           [facility]

a



     Chloride      3-25                               Rate: 50           l



     0.9% IV      06:03:                               ml/hr,           [place]



     1,000 mL      00                                 Infuse           



                                                  over: 20           



                                                  hr, Route:           



                                                  IV, Dosing           



                                                  Weight           



                                                  131.818           



                                                  kg, Total           



                                                  Volume:           



                                                  1,000,           



                                                  Start           



                                                  date:           



                                                  22           



                                                  1:03:00           



                                                  CDT,           



                                                  Duration:           



                                                  30 day,           



                                                  Stop date:           



                                                  22           



                                                  1:02:00           



                                                  CDT, BSA:           



                                                  2.4 m2, 0           

 

     Labetalol            No                       10 mg, 2           [facility]

[facility]



               3-25                               mL, Route:           l



               06:03:                               IVP, Drug                                            form: INJ,           



                                                  Q15Min,           



                                                  Dosing           



                                                  Weight           



                                                  131.818,           



                                                  kg, Start           



                                                  date:           



                                                  22           



                                                  1:03:00           



                                                  CDT,           



                                                  Duration:           



                                                  3 doses or           



                                                  times,           



                                                  Stop date:           



                                                  22           



                                                  1:33:00           



                                                  CDT, 0           

 

     Hydralazine            No                       Notes:           [facility]

[facility]



               3-25                               (Same as:           l



               06:03:                               Apresoline                                            ) Push           



                                                  over 5           



                                                  minutes           

 

     Ondansetron            No                       /= 4           [facility]

a



               3-25                               years,           l



               06:03:                               Pediatric                                            Dosing           

 

     Acetaminoph            No                       Notes: Do           M

[facility]



     en 325 MG /      3-25                               not exceed           l



     Hydrocodone      06:03:                               4gm/day of           

[place]



     Bitartrate      00                                 acetaminop           



     10 MG Oral                                         hen. (Same           



     Tablet                                         as: Norco           



     [Norco                                         325/10)           



     10/325]                                                        

 

     Dilaudid            No                       Notes:           [facility]



               3-25                               Same as           l



               06:03:                               Dilaudid                                                           

 

     Benadryl            No                       Notes:           [facility]



               3-25                               (Same as:           l



               06:03:                               Benadryl)           [place]



               00                                                

 

     phenol            No                       Notes:           [facility]



               3-25                               Chlorasept           l



               06:03:                               ic Spray                                            (Same as:           



                                                  Chlorasept           



                                                  ic, Sore           



                                                  Throat           



                                                  Spray)           



                                                  WASTE: F/P           



                                                  - Black; E           



                                                  -              



                                                  Municipal           



                                                  Trash Bin           

 

     Bisacodyl            No                       Notes:           [facility]

a



               3-25                               (Same As:           l



               06:03:                               Dulcolax,                                            [employer])           

 

     Robaxin            No                       Notes:           [facility]



               3-25                               (Same           l



               06:03:                               as:Robaxin                                            )              

 

     Melatonin 3            No                       Notes:           [facility]

[facility]



     MG Extended      3-25                               (Same as:           l



     Release      06:03:                               Melatonin)           [facility]

jorge



     Tablet      00                                                

 

     Tylenol            No                       Notes: Do           [facility]

ia



               3-25                               not exceed           l



               06:03:                               4 gm/day.           [facility]



                                                (Same as:           



                                                  Tylenol)           

 

     Reglan            No                       Notes:           [facility]



               3-25                               (Same as:           l



               06:03:                               Reglan)           [place]                                                

 

     Phenergan            No                       Notes: Do           [facility]

[facility]



               3-25                               not give           l



               06:03:                               IV push.                                            (Same as:           



                                                  Phenergan)           

 

     Saline            No                       Notes:           [facility]



     Flush 0.9%      3-25                               preservati           l



               06:03:                               ve free.                                                           

 

     butalbital-      2022- No             2{tbl}      2 tablet,         

  [facility]



     acetaminoph      3-18 03-18                          Oral,           [facility]



     en-caff      03:45: 03:10                          ONCE, 1           [place]



     (ESGIC)      00   :00                           dose, On           Medical



     -40                                         Thu            [facility]



     mg tablet 2                                         3/17/22 at           



     tablet                                         2245, ASAP           

 

     NaCl 0.9%       No             500mL      at 999           [facility]

ers



     (NS) bolus      3-18 03-18                          mL/hr, 500           it

y of



     infusion      02:30: 03:17                          mL, IV           [place]



     500 mL      00   :00                           Infusion,           Medical



                                                  ONCE, 1           [facility]



                                                  dose, On           



                                                  Thu            



                                                  3/17/22 at           



                                                  2130, STAT           

 

     diphenhydrA      2022- No             25mg      25 mg,           [facility]

vers



     MINE      3-18 03-18                          Slow IV           [facility] of



     (BENADRYL)      02:30: 01:46                          Push,           [place]



     injection      00   :00                           ONCE, 1           Medical



     25 mg                                         dose, On           [facility]



                                                  Thu            



                                                  3/17/22 at           



                                                  2130, STAT           

 

     magnesium      2022- No             2g        2 g, IV           [facility]

ers



     sulfate in      3-18 03-18                          Piggyback,           it

y of



     water 2      02:15: 03:17                          Administer           [facility]

as



     gram/50 mL      00   :00                           over 30           [facility]

l



     (4 %)                                         Minutes,           [facility]



     infusion 2                                         ONCE, 1           



     g                                            dose, On           



                                                  u            



                                                  3/17/22 at           



                                                  2115,           



                                                  Routine           

 

     HYDROmorpho      2022- No             .5mg      0.5 mg,           Un

[facility]



     ne                                  Slow IV           ity of



     (DILAUDID)      01:30: 01:25                          Push,           [place]



     injection      00   :00                           ONCE, 1           Medical



     0.5 mg                                         dose, On           [facility]



                                                  Tue            



                                                  22 at           



                                                  1930,           



                                                  Routine<br           



                                                  >Use           



                                                  approved           



                                                  by             



                                                  (Faculty):           



                                                  ADC            



                                                  PROVIDER           

 

     levoFLOXaci      2022- No        [id number] 750mg      Take 1        

   [facility]



     n 750 mg                                tablet by           ity o

f



     tablet      00:00: 05:59                          mouth           [place]



               00   :00                           daily for           Medical



                                                  4 days.           [facility]

 

     levoFLOXaci      2022- No        [id number] 750mg      Take 1        

   [facility]



     n 750 mg                                tablet by           ity o

f



     tablet      00:00: 05:59                          mouth           [place]



               00   :00                           daily for           Medical



                                                  4 days.           [facility]

 

     OXYCODONE            Yes                      Take by           [facility]

rs



     HCL/ACETAMI      1-25                               mouth.           ity of



     NOPHEN      19:49:                                              [place]



     (PERCOCET      27                                                Medical



     ORAL)                                                        [facility]

 

     OXYCODONE            Yes                      Take by           [facility]

rs



     HCL/ACETAMI      1-25                               mouth.           ity of



     NOPHEN      19:49:                                              [place]



     (PERCOCET      27                                                Medical



     ORAL)                                                        Branch

 

     OXYCODONE            Yes                      Take by           Unive

rs



     HCL/ACETAMI      1-25                               mouth.           ity of



     NOPHEN      19:49:                                              [place]



     (PERCOCET      27                                                Medical



     ORAL)                                                        [place]

 

     OXYCODONE            Yes                      Take by           [facility]

rs



     HCL/ACETAMI      -25                               mouth.           ity of



     NOPHEN      19:49:                                              [place]



     (PERCOCET      27                                                Medical



     ORAL)                                                        [place]

 

     vancomycin      2022- No             125mg      125 mg,           Un

[facility]



     (VANCOCIN)                                Oral, QID,           it

y of



     capsule 125      18:00: 21:59                          25 doses,           

[place]



     mg        00   :00                           First dose           Medical



                                                  (after           [facility]



                                                  last           



                                                  modificati           



                                                  on) on 22 at           



                                                  1200, Last           



                                                  dose on           



                                                  22 at           



                                                  1200,           



                                                  ASAP<br>Fa           



                                                  culty           



                                                  member           



                                                  approving           



                                                  Non-formul           



                                                  maryanne            



                                                  medication           



                                                  :              



                                                  MEGADC<br&           



                                                  gt;Reason           



                                                  for            



                                                  non-formul           



                                                  maryanne use:           



                                                  SPECIFIC           



                                                  INDICATION           



                                                  FOR            



                                                  NONFORMULA           



                                                  RY             



                                                  PRODUCT<br           



                                                  >Reason           



                                                  for            



                                                  Anti-Infec           



                                                  tive:           



                                                  Documented           



                                                  Infection<           



                                                  br>Documen           



                                                  huma            



                                                  Infection           



                                                  Site:           



                                                  Abdominal<           



                                                  br>Duratio           



                                                  n of           



                                                  Therapy:           



                                                  14 days           

 

     levoFLOXaci            Yes            750mg      750 mg,           Un

[facility]



     n         -25                               Oral,           ity of



     (LEVAQUIN)      15:00:                               DAILY,           [place]



     tablet 750      00                                 First dose           Med

ical



     mg                                           (after           [facility]



                                                  last           



                                                  modificati           



                                                  on) on 22 at           



                                                  0900,           



                                                  Until           



                                                  Discontinu           



                                                  ed,            



                                                  ASAP<br>Re           



                                                  ason for           



                                                  Anti-Infec           



                                                  tive:           



                                                  Empiric           



                                                  Therapy           



                                                  for            



                                                  Suspected           



                                                  Infection<           



                                                  br>Empiric           



                                                  Therapy           



                                                  Site:           



                                                  Urine<br>D           



                                                  uration of           



                                                  therapy:           



                                                  72 hours           

 

     lactobacill      2022- No        [id number] .5mg      Take 1         

  [facility]



     us        -25                          tablet by           ity of



     acidophilus      00:00: 05:59                          mouth 2           [facility]

[place]



               00   :00                           (two)           Medical



                                                  times           [facility]



                                                  daily for           



                                                  30 days.           

 

     lactobacill      2022- No        [id number] .5mg      Take 1         

  [facility]



     us         02-25                          tablet by           ity of



     acidophilus      00:00: 05:59                          mouth 2           [facility]

[place]



               00   :00                           (two)           Medical



                                                  times           [facility]



                                                  daily for           



                                                  30 days.           

 

     vancomycin      2022- No        [id number] 125mg      Take 1         

  [facility]



     125 mg                                capsule by           ity of



     capsule      00:00: 05:59                          mouth 4           [place]



               00   :00                           (four)           Medical



                                                  times           [facility]



                                                  daily for           



                                                  5 days.           

 

     vancomycin       202- No        [id number] 125mg      Take 1         

  [facility]



     125 mg                                capsule by           ity of



     capsule      00:00: 05:59                          mouth 4           [place]



               00   :00                           (four)           Medical



                                                  times           [facility]



                                                  daily for           



                                                  5 days.           

 

     traMADoL            Yes            50mg      50 mg,           [facility]



     (ULTRAM)                                     Oral,           ity of



     tablet 50      21:26:                               Q6HPRN,           [place]



     mg        10                                 Starting           Medical



                                                  on Mon           [facility]



                                                  22 at           



                                                  1526,           



                                                  Until           



                                                  Discontinu           



                                                  ed,            



                                                  Routine,           



                                                  Pain           



                                                  (scale           



                                                  4-6)           

 

     levoFLOXaci      2022- No             250mg      250 mg,           U

n[facility]



     n                                   Oral, Q24H           ity of



     (LEVAQUIN)      19:30: 23:18                          ABX, First           

[place]



     tablet 250      00   :17                           dose           Medical



     mg                                           (after           Branch



                                                  last           



                                                  modificati           



                                                  on) on 22 at           



                                                  1330,           



                                                  Until           



                                                  Discontinu           



                                                  ed,            



                                                  ASAP<br>Re           



                                                  ason for           



                                                  Anti-Infec           



                                                  tive:           



                                                  Empiric           



                                                  Therapy           



                                                  for            



                                                  Suspected           



                                                  Infection<           



                                                  br>Empiric           



                                                  Therapy           



                                                  Site:           



                                                  Urine<br>D           



                                                  uration of           



                                                  therapy:           



                                                  72 hours           

 

     HYDROmorpho      2022- No             .5mg      0.5 mg,           Un

[facility]



     ne                                  Slow IV           ity of



     (DILAUDID)      20:45: 18:23                          Push,           [place]



     injection      00   :44                           Q6HPRN,           Medical



     0.5 mg                                         Starting           Branch



                                                  on Sun           



                                                  22 at           



                                                  1445,           



                                                  Until 22 at           



                                                  1223,           



                                                  Routine,           



                                                  Pain           



                                                  (scale           



                                                  7-10),           



                                                  breakthrou           



                                                  gh             



                                                  pain<br>Us           



                                                  e approved           



                                                  by             



                                                  (Faculty):           



                                                  ADC            



                                                  PROVIDER           

 

     oxyCODONE-a            Yes            2{tbl}      2 tablet,          

 [facility]



     cetaminophe                                     Oral,           ity of



     n         20:39:                               Q6HPRN,           [place]



     (PERCOCET)      03                                 Starting           Medic

al



     5-325 mg                                         on Sun           Branch



     per tablet                                         22 at           



     2 tablet                                         1439,           



                                                  Until           



                                                  Discontinu           



                                                  ed,            



                                                  Routine,           



                                                  Pain           



                                                  (scale           



                                                  7-10)           

 

     HYDROmorpho      2022- No             .5mg      0.5 mg,           Un

[place]



     ne                                  Slow IV           ity of



     (DILAUDID)      00:00: 23:41                          Push,           [place]



     injection      00   :00                           ONCE, 1           Medical



     0.5 mg                                         dose, On           Branch



                                                  Sat            



                                                  22 at           



                                                  1800,           



                                                  Routine<br           



                                                  >Use           



                                                  approved           



                                                  by             



                                                  (Faculty):           



                                                  ADC            



                                                  PROVIDER           

 

     ertapenem      2022- No             1000mg      1,000 mg,           

[facility]



     (INVANZ)                                IV             ity of



     1,000 mg in      15:45: 18:30                          Piggyback,          

 [place]



     NaCl 0.9%      00   :00                           Q24H ABX,           Medic

al



     (NS) 50 mL                                         First dose           Bra

[facility]



     MINI-BAG                                         on Sat           



                                                  22 at           



                                                  0945,           



                                                  Until           



                                                  Discontinu           



                                                  ed,            



                                                  Administer           



                                                  over 30           



                                                  Minutes,           



                                                  50             



                                                  mL<br>Reas           



                                                  on for           



                                                  Anti-Infec           



                                                  tive:           



                                                  Empiric           



                                                  Therapy           



                                                  for            



                                                  Suspected           



                                                  Infection<           



                                                  br>Empiric           



                                                  Therapy           



                                                  Site:           



                                                  Urine<br>D           



                                                  uration of           



                                                  therapy:           



                                                  72             



                                                  hours<br>R           



                                                  estricted           



                                                  use            



                                                  approved           



                                                  by: ADC           



                                                  PROVIDER           

 

     lactobacill            Yes            .5mg      0.5 mg,           Uni

vers



     us                                       Oral, BID,           ity of



     acidophilus      02:00:                               First dose           

[place]



     tablet 0.5      00                                 on Fri           Medical



     mg                                           22 at           Branch



                                                  2000,           



                                                  Until           



                                                  Discontinu           



                                                  ed,            



                                                  Routine           

 

     vancomycin      2022- No             250mg      250 mg,           Un

[facility]



     (VANCOCIN)                                Oral, QID,           it

y of



     capsule 250      02:00: 16:20                          First dose          

 [place]



     mg        00   :40                           (after           Medical



                                                  last           Branch



                                                  reorder)           



                                                  on Fri           



                                                  22 at           



                                                  2000,           



                                                  Until           



                                                  Discontinu           



                                                  ed,            



                                                  ASAP&lt;br           



                                                  >Faculty           



                                                  member           



                                                  approving           



                                                  Non-formul           



                                                  maryanne            



                                                  medication           



                                                  :              



                                                  MEGADC<br>           



                                                  Reason for           



                                                  non-formul           



                                                  maryanne use:           



                                                  SPECIFIC           



                                                  INDICATION           



                                                  FOR            



                                                  NONFORMULA           



                                                  RY             



                                                  PRODUCT<br           



                                                  >Reason           



                                                  for            



                                                  Anti-Infec           



                                                  tive:           



                                                  Documented           



                                                  Infection<           



                                                  br>Documen           



                                                  huma            



                                                  Infection           



                                                  Site:           



                                                  Abdominal<           



                                                  br>Duratio           



                                                  n of           



                                                  Therapy:           



                                                  14 days           

 

     vancomycin      2022- No             250mg      250 mg,           Un

[place]



     (VANCOCIN)                                Oral,           ity of



     capsule 250      00:45: 00:34                          ONCE, 1           [place]

[place]



     mg        00   :00                           dose, On           Medical



                                                  Fri            Branch



                                                  22 at           



                                                  1845,           



                                                  ASAP<br>Fa           



                                                  culty           



                                                  member           



                                                  approving           



                                                  Non-formul           



                                                  maryanne            



                                                  medication           



                                                  :              



                                                  MEGADC<br>           



                                                  Reason for           



                                                  non-formul           



                                                  maryanne use:           



                                                  SPECIFIC           



                                                  INDICATION           



                                                  FOR            



                                                  NONFORMULA           



                                                  RY             



                                                  PRODUCT<br           



                                                  >Reason           



                                                  for            



                                                  Anti-Infec           



                                                  tive:           



                                                  Documented           



                                                  Infection<           



                                                  br>Documen           



                                                  huma            



                                                  Infection           



                                                  Site:           



                                                  Abdominal<           



                                                  br>Duratio           



                                                  n of           



                                                  Therapy:           



                                                  14 days           

 

     lidocaine      2022- No             5mL       5 mL,           Univer

s



     1% (PF)                                Subcutaneo           ity o

f



     (XYLOCAINE)      23:45: 02:25                          us, ONCE,           

[place]



     injection 5      00   :00                           1 dose, On           Me

dical



     mL                                           Fri            Branch



                                                  22 at           



                                                  1745,           



                                                  Routine           

 

     NaCl 0.9%            Yes            10mL      10 mL,           Univer

s



     (NS)                                     Slow IV           ity of



     injection      23:38:                               Push, PRN,           [facility]

[place]



     10 mL      41                                 Starting           Medical



                                                  on Fri           Branch



                                                  22 at           



                                                  1738,           



                                                  Until           



                                                  Discontinu           



                                                  ed,            



                                                  Routine,           



                                                  line           



                                                  maintenanc           



                                                  e              

 

     meropenem      2022- No             1g        1 g, IV           Univ

ers



     (MERREM)                           Piggyback,           it

y of



     g in NaCl      23:15: 14:33                          Q8H ABX,           Eric

as



     0.9% (NS)      00   :35                           First dose           Medi

[place]



     100 mL                                         (after           Branch



     MINI-BAG                                         last           



                                                  modificati           



                                                  on) on Thu           



                                                  22 at           



                                                  1715,           



                                                  Until           



                                                  Discontinu           



                                                  ed,            



                                                  Administer           



                                                  over 60           



                                                  Minutes,           



                                                  100            



                                                  mL<br>Rest           



                                                  ricted use           



                                                  approved           



                                                  by: ADC           



                                                  PROVIDER<b           



                                                  r&gt;Reaso           



                                                  n for           



                                                  Anti-Infec           



                                                  tive:           



                                                  Documented           



                                                  Infection<           



                                                  br>Documen           



                                                  huma            



                                                  Infection           



                                                  Site:           



                                                  Urine<br>D           



                                                  uration of           



                                                  Therapy: 7           



                                                  days           

 

     enoxaparin            Yes            40mg      40 mg,           Unive

rs



     (LOVENOX)                                     Subcutaneo           ity 

of



     injection      23:00:                               us, DAILY,           [place]

[place]



     40 mg      00                                 First dose           Medical



                                                  on Thu           Branch



                                                  22 at           



                                                  1700,           



                                                  Until           



                                                  Discontinu           



                                                  ed,            



                                                  Routine           

 

     meropenem      2022- No             1g        1 g, IV           Univ

ers



     (MERREM)                           Piggyback,           it

y of



     g in NaCl      16:00: 20:33                          Q8H ABX,           Eric

as



     0.9% (NS)      00   :04                           First dose           Medi

[place]



     100 mL                                         (after           Branch



     MINI-BAG                                         last           



                                                  reorder)           



                                                  on Thu           



                                                  22 at           



                                                  1000,           



                                                  Until           



                                                  Discontinu           



                                                  ed,            



                                                  Administer           



                                                  over 60           



                                                  Minutes,           



                                                  100            



                                                  mL<br>Rest           



                                                  ricted use           



                                                  approved           



                                                  by: ADC           



                                                  PROVIDER<b           



                                                  r>Reason           



                                                  for            



                                                  Anti-Infec           



                                                  tive:           



                                                  Documented           



                                                  Infection<           



                                                  br>Documen           



                                                  huma            



                                                  Infection           



                                                  Site:           



                                                  Urine<br>D           



                                                  uration of           



                                                  Therapy:           



                                                  Other (see           



                                                  Comments)           

 

     HYDROmorpho       2022- No             .5mg      0.5 mg,           Un

[place]



     ne                                  Slow IV           ity of



     (DILAUDID)      14:29: 20:41                          Push,           [place]



     injection      58   :17                           Q4HPRN,           Medical



     0.5 mg                                         Starting           Branch



                                                  on u           



                                                  22 at           



                                                  0829,           



                                                  Until Sun           



                                                  22 at           



                                                  1441,           



                                                  Routine,           



                                                  Pain           



                                                  (scale           



                                                  7-10)<br>U           



                                                  se             



                                                  approved           



                                                  by             



                                                  (Faculty):           



                                                  ADC            



                                                  PROVIDER           

 

     proMETHazin            Yes            25mg      25 mg,           Univ

ers



     e         1-                               Oral,           ity of



     (PHENERGAN)      09:42:                               Q4HPRN,           Eric

as



     tablet 25      07                                 Starting           [facility]

l



     mg                                           on Thu           [facility]



                                                  22 at           



                                                  0342,           



                                                  Until           



                                                  Discontinu           



                                                  ed,            



                                                  Routine,           



                                                  Nausea and           



                                                  Vomiting           



                                                  (N/V)           

 

     HYDROmorpho      2022- No             .5mg      0.5 mg,           Un

[facility]



     ne                                  Slow IV           ity of



     (DILAUDID)      09:41: 12:04                          Push,           [place]



     injection      36   :38                           Q4HPRN,           Medical



     0.5 mg                                         Starting           [facility]



                                                  on u           



                                                  22 at           



                                                  0341,           



                                                  Until u           



                                                  22 at           



                                                  0604,           



                                                  Routine,           



                                                  Pain           



                                                  (scale           



                                                  7-10)<br>U           



                                                  se             



                                                  approved           



                                                  by             



                                                  (Faculty):           



                                                  ADC            



                                                  PROVIDER           

 

     proCHLORper            Yes            10mg      10 mg,           Univ

ers



     azine      1-20                               Slow IV           ity of



     (COMPAZINE)      09:07:                               Push,           [place]



     injection      27                                 Q6HPRN,           Medical



     10 mg                                         Starting           [facility]



                                                  on Thu           



                                                  22 at           



                                                  0307,           



                                                  Until           



                                                  Discontinu           



                                                  ed,            



                                                  Routine,           



                                                  Nausea and           



                                                  Vomiting           



                                                  (N/V)           

 

     acetaminoph            Yes            650mg      650 mg,           Un

[facility]



     en        -20                               Oral,           ity of



     (TYLENOL)      09:07:                               Q6HPRN,           [place]



     tablet 650      10                                 Starting           Medic

al



     mg                                           on Thu           [facility]



                                                  22 at           



                                                  0307,           



                                                  Until           



                                                  Discontinu           



                                                  ed,            



                                                  Routine,           



                                                  Pain           



                                                  (scale           



                                                  1-3)           

 

     meropenem      2022- No             1g        1 g, IV           Un[facility]

ers



     (MERREM) 1                                Piggyback,           it

y of



     g in NaCl      07:15: 08:49                          ONCE, 1           [place]

[facility]



     0.9% (NS)      00   :00                           dose, On           [facility]

l



     100 mL                                         Thu            [facility]



     MINI-BAG                                         22 at           



                                                  0115,           



                                                  Administer           



                                                  over 60           



                                                  Minutes,           



                                                  100            



                                                  mL<br>Rest           



                                                  ricted use           



                                                  approved           



                                                  by: [facility]           



                                                  PROVIDER<b           



                                                  r>Reason           



                                                  for            



                                                  Anti-Infec           



                                                  tive:           



                                                  Documented           



                                                  Infection<           



                                                  br>Documen           



                                                  huma            



                                                  Infection           



                                                  Site:           



                                                  Urine<br>D           



                                                  uration of           



                                                  Therapy:           



                                                  Other (see           



                                                  Comments)           

 

     cefdinir      2022- No             300mg      300 mg,           Univ

ers



     (OMNICEF)                                Oral,           ity of



     capsule 300      03:30: 02:55                          ONCE, 1           [facility]

[facility]



     mg        00   :00                           dose, On           Medical



                                                  Mon            [facility]



                                                  22 at           



                                                  2130,           



                                                  ASAP<br>Re           



                                                  ason for           



                                                  Anti-Infec           



                                                  tive:           



                                                  Documented           



                                                  Infection<           



                                                  br>Documen           



                                                  huma            



                                                  Infection           



                                                  Site:           



                                                  Urine<br>D           



                                                  uration of           



                                                  Therapy: 7           



                                                  days           

 

     FENTanyl PF      2022- No             50ug      50 mcg,           Un

[facility]



     (SUBLIMAZE                                Slow IV           ity o

f



     (PF))      01:15: 03:26                          Push,           [place]



     injection      00   :00                           ONCE, 1           Medical



     50 mcg                                         dose, On           [facility]



                                                  Mon            



                                                  22 at           



                                                  1915, STAT           

 

     proMETHazin      2022- No             25mg      25 mg, IV           

[facility]



     e                                   Piggyback,           ity of



     (PHENERGAN)      01:15: 03:26                          ONCE, 1           [facility]

[facility]



     25 mg in      00   :00                           dose, On           Medical



     NaCl 0.9%                                         Mon            [facility]



     (NS) 50 mL                                         22 at           



     piggyback                                         1915, 50           



                                                  mL             

 

     cefdinir      2022- No        [id number] 300mg      Take 1           U

n[facility]



     300 mg                                capsule by           ity of



     capsule      00:00: 05:59                          mouth           [place]



               00   :00                           every 12           Medical



                                                  (twelve)           [facility]



                                                  hours for           



                                                  10 days.           

 

     cefdinir      2022- No        [id number] 300mg      Take 1           U

nivers



     300 mg                                capsule by           ity of



     capsule      00:00: 00:00                          mouth           [place]



               00   :00                           every 12           Medical



                                                  (twelve)           [facility]



                                                  hours for           



                                                  10 days.           

 

     FENTanyl PF      2022- No             50ug      50 mcg,           Un

[facility]



     (SUBLIMAZE                                Slow IV           ity o

f



     (PF))      02:00: 01:19                          Push,           [place]



     injection      00   :00                           ONCE, 1           Medical



     50 mcg                                         dose, On           [facility]



                                                  Sat            



                                                  1/15/22 at           



                                                  2000,           



                                                  Routine           

 

     methocarbam      2022- No             1000mg      1,000 mg,         

  [facility]



     oL                                  Oral,           ity of



     (ROBAXIN)      02:00: 01:19                          ONCE, 1           [place]

[facility]



     tablet      00   :00                           dose, On           Medical



     1,000 mg                                         Sat            [facility]



                                                  1/15/22 at           



                                                  2000, ASAP           

 

     proMETHazin      2022- No             12.5mg      12.5 mg,          

 [facility]



     e         1-15 01-15                          IV             ity of



     (PHENERGAN)      22:46: 23:00                          Piggyback,          

 [place]



     12.5 mg in      00   :00                           ONCE, 1           [facility]

l



     NaCl 0.9%                                         dose, On           [facility]



     (NS) 50 mL                                         Sat            



     piggyback                                         1/15/22 at           



                                                  1700, 50           



                                                  mL             

 

     FENTanyl PF      2022- No             50ug      50 mcg,           Un

[facility]



     (SUBLIMAZE      1-15 01-15                          Slow IV           ity o

f



     (PF))      22:45: 23:00                          Push,           [place]



     injection      00   :00                           ONCE, 1           Medical



     50 mcg                                         dose, On           [facility]



                                                  Sat            



                                                  1/15/22 at           



                                                  1645,           



                                                  Routine           

 

     methocarbam      -0      Yes       [id number] 1000mg      Take 2         

  [facility]



     oL 500 mg      1-15                               tablets by           ity 

of



     tablet      00:00:                               mouth 4           [place]



               00                                 (four)           Medical



                                                  times           [facility]



                                                  daily as           



                                                  needed for           



                                                  Pain           



                                                  (scale           



                                                  1-3).           

 

     methocarbam      -0      Yes       [id number] 1000mg      Take 2         

  [facility]



     oL 500 mg      1-15                               tablets by           ity 

of



     tablet      00:00:                               mouth 4           [place]



               00                                 (four)           Medical



                                                  times           [facility]



                                                  daily as           



                                                  needed for           



                                                  Pain           



                                                  (scale           



                                                  1-3).           

 

     methocarbam            Yes       [id number] 1000mg      Take 2         

  [facility]



     oL 500 mg      1-15                               tablets by           ity 

of



     tablet      00:00:                               mouth 4           [place]



               00                                 (four)           Medical



                                                  times           [facility]



                                                  daily as           



                                                  needed for           



                                                  Pain           



                                                  (scale           



                                                  1-3).           

 

     methocarbam      0      Yes       [id number] 1000mg      Take 2         

  [facility]



     oL 500 mg      1-15                               tablets by           ity 

of



     tablet      00:00:                               mouth 4           [place]



               00                                 (four)           Medical



                                                  times           [facility]



                                                  daily as           



                                                  needed for           



                                                  Pain           



                                                  (scale           



                                                  1-3).           

 

     methocarbam            Yes       [id number] 1000mg      Take 2         

  [facility]



     oL 500 mg      1-15                               tablets by           ity 

of



     tablet      00:00:                               mouth 4           [place]



               00                                 (four)           Medical



                                                  times           [facility]



                                                  daily as           



                                                  needed for           



                                                  Pain           



                                                  (scale           



                                                  1-3).           

 

     methocarbam            Yes       [id number] 1000mg      Take 2         

  [facility]



     oL 500 mg      1-15                               tablets by           ity 

of



     tablet      00:00:                               mouth 4           [place]



               00                                 (four)           Medical



                                                  times           [facility]



                                                  daily as           



                                                  needed for           



                                                  Pain           



                                                  (scale           



                                                  1-3).           

 

     methocarbam      2022- No        [id number] 1000mg      Take 2        

   [facility]



     oL 500 mg      1-15 05-11                          tablets by           ity

 of



     tablet      00:00: 00:00                          mouth 4           [place]



               00   :00                           (four)           Medical



                                                  times           [facility]



                                                  daily as           



                                                  needed for           



                                                  Pain           



                                                  (scale           



                                                  1-3).           

 

     proMETHazin      2021 No             25mg      25 mg, IV           

[facility]



     e         -                          Piggyback,           ity of



     (PHENERGAN)      23:15: 22:20                          ONCE, 1           [facility]

[facility]



     25 mg in      00   :00                           dose, On           Medical



     NaCl 0.9%                                         Wed            [facility]



     (NS) 50 mL                                         21           



     piggyback                                         at 1715,           



                                                  50 mL           

 

     FENTanyl PF      2021- No             75ug      75 mcg,           Un

[facility]



     (SUBLIMAZE                                Slow IV           ity o

f



     (PF))      22:15: 22:20                          Push,           [place]



     injection      00   :00                           ONCE, 1           Medical



     75 mcg                                         dose, On           [facility]



                                                  Wed            



                                                  21           



                                                  at 1615,           



                                                  Routine           

 

     fidaxomicin      2021      Yes       [id number] 200mg      Take 1          

 [facility]



     200 mg      1-24                               tablet by           ity of



     tablet      00:00:                               mouth 2           [place]



               00                                 (two)           Medical



                                                  times           [facility]



                                                  daily.           

 

     proMETHazin      2021      Yes       [id number] 25mg      Take 1           

[facility]



     e 25 mg      1-24                               tablet by           ity of



     tablet      00:00:                               mouth           [place]



               00                                 every 6           Medical



                                                  (six)           [facility]



                                                  hours as           



                                                  needed for           



                                                  Nausea and           



                                                  Vomiting           



                                                  (N/V).           

 

     fidaxomicin      2021      Yes       [id number] 200mg      Take 1          

 [facility]



     200 mg      1-24                               tablet by           ity of



     tablet      00:00:                               mouth 2           [place]



               00                                 (two)           Medical



                                                  times           [facility]



                                                  daily.           

 

     proMETHazin      2021      Yes       [id number] 25mg      Take 1           

[facility]



     e 25 mg      1-24                               tablet by           ity of



     tablet      00:00:                               mouth           [place]



               00                                 every 6           Medical



                                                  (six)           [facility]



                                                  hours as           



                                                  needed for           



                                                  Nausea and           



                                                  Vomiting           



                                                  (N/V).           

 

     cholestyram      2021      Yes                                     [facility]

s



     ine 4 gram      1-24                                              ity of



     packet      00:00:                                              [place]



               00                                                [facility]



                                                                 [facility]

 

     fidaxomicin      2021      Yes       [id number] 200mg      Take 1          

 [facility]



     200 mg      1-24                               tablet by           ity of



     tablet      00:00:                               mouth 2           [place]



               00                                 (two)           Medical



                                                  times           [facility]



                                                  daily.           

 

     proMETHazin      2021      Yes       [id number] 25mg      Take 1           

[facility]



     e 25 mg      1-24                               tablet by           ity of



     tablet      00:00:                               mouth           [place]



               00                                 every 6           Medical



                                                  (six)           [facility]



                                                  hours as           



                                                  needed for           



                                                  Nausea and           



                                                  Vomiting           



                                                  (N/V).           

 

     cholestyram      2021      Yes                                     Univer

s



     ine 4 gram      1-24                                              ity of



     packet      00:00:                                              [place]



                                                               [facility]



                                                                 [facility]

 

     cholestyram      2021      Yes                                     Univer

s



     ine 4 gram      1-24                                              ity of



     packet      00:00:                                              [place]



               00                                                Medical



                                                                 [facility]anch

 

     cholestyram      2021      Yes                                     Univer

s



     ine 4 gram      1-24                                              ity of



     packet      00:00:                                              [place]



                                                               [facility]



                                                                 [facility]anch

 

     cholestyram      2021      Yes                                     Univer

s



     ine 4 gram      1-24                                              ity of



     packet      00:00:                                              [place]



               00                                                [facility]



                                                                 [facility]anch

 

     cholestyram      2021      Yes                                     Univer

s



     ine 4 gram      1-24                                              ity of



     packet      00:00:                                              [place]



                                                               [facility]



                                                                 Branch

 

     fidaxomicin      2021      Yes       [id number] 200mg      Take 1          

 [facility]



     200 mg      1-24                               tablet by           ity of



     tablet      00:00:                               mouth 2           [place]



               00                                 (two)           Medical



                                                  times           [facility]



                                                  daily.           

 

     proMETHazin      2021      Yes       [id number] 25mg      Take 1           

[facility]



     e 25 mg      1-24                               tablet by           ity of



     tablet      00:00:                               mouth           [place]



               00                                 every 6           Medical



                                                  (six)           [facility]



                                                  hours as           



                                                  needed for           



                                                  Nausea and           



                                                  Vomiting           



                                                  (N/V).           

 

     cholestyram      2021 No                                      Unive

rs



     ine 4 gram       05-11                                         ity of



     packet      00:00: 00:00                                         [place]



               00   :00                                          Medical



                                                                 Branch

 

     fidaxomicin      2021- No        [id number] 200mg      Take 1         

  [facility]



     200 mg      -                          tablet by           ity of



     tablet      00:00: 00:00                          mouth 2           [place]



               00   :00                           (two)           Medical



                                                  times           [facility]



                                                  daily.           

 

     proMETHazin      2021- No        [id number] 25mg      Take 1          

 [facility]



     e 25 mg      25                          tablet by           ity of



     tablet      00:00: 00:00                          mouth           [place]



               00   :00                           every 6           Medical



                                                  (six)           [facility]



                                                  hours as           



                                                  needed for           



                                                  Nausea and           



                                                  Vomiting           



                                                  (N/V).           

 

     FENTanyl PF       No             50ug      50 mcg,           Un

[facility]



     (SUBLIMAZE      5-10 05-10                          Intravenou           it

y of



     (PF))      02:45: 01:34                          s, ONCE, 1           [place]



     injection      00   :00                           dose, Sun           Medic

al



     50 mcg                                         21 at           Branch



                                                  2145,           



                                                  Routine           

 

     cefTRIAXone       No             1000mg      1,000 mg,         

  [facility]



     (ROCEPHIN)      5-10 05-10                          IV             ity of



     1,000 mg in      02:30: 01:55                          Piggyback,          

 [place]



     NaCl 0.9%      00   :00                           ONCE, 1           Medical



     (NS) 50 mL                                         dose, [place]           [facility]

ch



     MINI-BAG                                         21 at           



                                                  2130, 50           



                                                  mL<br>Reas           



                                                  on for           



                                                  Anti-Infec           



                                                  tive:           



                                                  Documented           



                                                  Infection<           



                                                  br>Documen           



                                                  huma            



                                                  Infection           



                                                  Site:           



                                                  Urine<br>D           



                                                  uration of           



                                                  Therapy: 7           



                                                  days           

 

     famotidine       No             20mg      20 mg,           Univ

ers



     (PEPCID      5-10 05-10                          Slow IV           ity of



     (PF))      01:00: 00:12                          Push,           [place]



     injection      00   :00                           ONCE, 1           Medical



     20 mg                                         dose, [facility]



                                                  21 at           



                                                  2000, ASAP           

 

     proMETHazin      2021- No             25mg      25 mg, IV           

[facility]



     e         5-10 05-10                          Piggyback,           ity of



     (PHENERGAN)      00:45: 00:11                          ONCE, 1           [facility]

[facility]



     25 mg in      00   :00                           dose, Sun           Medica

l



     NaCl 0.9%                                         21 at           [facility]

h



     (NS) 50 mL                                         1945, 50           



     piggyback                                         mL             

 

     FENTanyl PF       No             50ug      50 mcg,           Un

[facility]



     (SUBLIMAZE      5-10 05-10                          Intravenou           it

y of



     (PF))      00:45: 00:12                          s, ONCE, 1           [place]



     injection      00   :00                           dose, Sun           Medic

al



     50 mcg                                         21 at           [facility]



                                                  1945,           



                                                  Routine           

 

     NaCl 0.9%      2021- No             1000mL      at 999           Uni

vers



     (NS) bolus      5-10 05-10                          mL/hr,           ity of



     infusion      00:00: 01:47                          1,000 mL,           [facility]

as



     1,000 mL      00   :00                           IV             Medical



                                                  Infusion,           [place]



                                                  ONCE, 1           



                                                  dose, Sun           



                                                  21 at           



                                                  1900, ASAP           

 

     cefdinir      2021- No        [id number] 300mg      Take 1           U

nivers



     300 mg       05-20                          capsule by           ity of



     capsule      00:00: 04:59                          mouth 2           [place]



               00   :00                           (two)           Medical



                                                  times           [place]



                                                  daily for           



                                                  10 days.           

 

     buPROPion       No             150mg QD   Take 1           [facility]



     (WELLBUTRIN      1-17 01-16                          tablet           [facility]



     XL) 150 MG      00:00: 23:59                          (150 mg           Med

ical



     24 hr      00   :00                           total) by           Center



     tablet                                         mouth           



                                                  daily.           

 

     citalopram       No             40mg QD   Take 1           [facility]



     (CELEXA) 40      1-17 01-16                          tablet (40           [facility]

[facility]



     MG tablet      00:00: 23:59                          mg total)           Me

dical



               00   :00                           by mouth           Center



                                                  daily.           

 

     oxyCODONE-a            Yes            1{tbl}      Take 1           [facility]



     cetaminophe      1-16                               tablet by           Ni

es



     n         14:44:                               mouth           Medical



     (PERCOCET)      50                                 every 4           Center



      mg                                         (four)           



     per tablet                                         hours as           



                                                  needed for           



                                                  Pain.           

 

     oxyCODONE-a      -0      Yes            1{tbl}      Take 1           [facility]



     cetaminophe      1-16                               tablet by           Ni

es



     n         14:44:                               mouth           Medical



     (PERCOCET)      50                                 every 4           Center



      mg                                         (four)           



     per tablet                                         hours as           



                                                  needed for           



                                                  Pain.           

 

     zinc      -0      Yes            5g   Q.5D Apply 5 g           [facility]



     oxide-yuan      1-16                               topically           Ni

es



     latum      00:00:                               2 (two)           Medical



     (CRITIC-AID      00                                 times           Center



     ) 20-51 %                                         daily.           



     Pste                                                        



     topical                                                        



     paste                                                        

 

     meropenem      2020-0      Yes            1g        Inject 1 g           [facility]



     (MERREM)      1-16                               intravenou           [facility]



     MBP 1 gm in      00:00:                               sly every           M

edical



     100 mL NS      00                                 8 (eight)           [facility]

r



                                                  hours.           

 

     insulin      2020-0      Yes            58U  Q.5D Inject 58           [facility]

[facility]



     glargine      1-16                               Units           [facility]



     (LANTUS)      00:00:                               subcutaneo           Med

ical



     100 unit/mL      00                                 usly 2           Center



     injection                                         (two)           



                                                  times           



                                                  daily Use           



                                                  as             



                                                  directed.           

 

     zinc      2020-0      Yes            5g   Q.5D Apply 5 g           [facility]



     oxide-yuan      1-16                               topically           Ni

es



     latum      00:00:                               2 (two)           Medical



     (CRITIC-AID      00                                 times           Center



     ) 20-51 %                                         daily.           



     Pste                                                        



     topical                                                        



     paste                                                        

 

     meropenem      2020-0      Yes            1g        Inject 1 g           [facility]



     (MERREM)      1-16                               intravenou           [facility]



     MBP 1 gm in      00:00:                               sly every           M

edical



     100 mL NS      00                                 8 (eight)           [facility]

r



                                                  hours.           

 

     insulin      2020-0      Yes            58U  Q.5D Inject 58           [facility]

[facility]



     glargine      1-16                               Units           [facility]



     (LANTUS)      00:00:                               subcutaneo           Med

ical



     100 unit/mL      00                                 usly 2           Center



     injection                                         (two)           



                                                  times           



                                                  daily Use           



                                                  as             



                                                  directed.           

 

     insulin      -0 - No             0U        Inject           [facility]



     lispro      1-16 01-15                          0-12 Units           [facility]



     (HUMALOG)      00:00: 23:59                          subcutaneo           M

edical



     100 unit/mL      00   :00                           usly 3           Center



     injection                                         (three)           



                                                  times           



                                                  daily           



                                                  before           



                                                  meals.           

 

     insulin      -0 - No             25U       Inject 25           [facility]



     lispro      1-16 01-15                          Units           [facility]



     (HUMALOG)      00:00: 23:59                          subcutaneo           M

edical



     100 unit/mL      00   :00                           usly 3           Center



     injection                                         (three)           



                                                  times           



                                                  daily           



                                                  before           



                                                  meals.           

 

     normal      2018      No                       1,000 mL,           [facility]

a



     saline 0.9%      08                               Rate: 100           l



     IV 1,000 mL      11:04:                               ml/hr,           [facility]

jorge



               00                                 Infuse           



                                                  over: 10           



                                                  hr, Route:           



                                                  IV, Dosing           



                                                  Weight           



                                                  131.818           



                                                  kg, Total           



                                                  Volume:           



                                                  1,000,           



                                                  Start           



                                                  date:           



                                                  18           



                                                  5:04:00           



                                                  CST,           



                                                  Duration:           



                                                  30 day,           



                                                  Stop date:           



                                                  18           



                                                  5:03:00           



                                                  CST, 2.4,           



                                                  m2             

 

     normal      2018      No                       1,000 mL,           [facility]

a



     saline 0.9%                                     Rate: 100           l



     IV 1,000 mL      11:04:                               ml/hr,           [facility]

                                 Infuse           



                                                  over: 10           



                                                  hr, Route:           



                                                  IV, Dosing           



                                                  Weight           



                                                  131.818           



                                                  kg, Total           



                                                  Volume:           



                                                  1,000,           



                                                  Start           



                                                  date:           



                                                  18           



                                                  5:04:00           



                                                  CST,           



                                                  Duration:           



                                                  30 day,           



                                                  Stop date:           



                                                  18           



                                                  5:03:00           



                                                  CST, 2.4,           



                                                  m2             

 

     Magnesium      2018      No                       Notes:           [facility]

a



     Sulfate                                     WASTE: F/P           l



               10:36:                               - Sink; E                                            -              



                                                  Municipal           



                                                  Trash Bin           

 

     Isolyte S      2018      No                       Notes:           [facility]

a



     PH-7.4                                     (Same as:           l



     (Bolus) IV      10:36:                               Isolyte S           He

rmann



               00                                 PH 7.4)           

 

     Magnesium      2018      No                       Notes:           [facility]

a



     Sulfate                                     WASTE: F/P           l



               10:36:                               - Sink; E           [facility]                                 -              



                                                  Municipal           



                                                  Trash Bin           

 

     Isolyte S      2018      No                       Notes:           [facility]

a



     PH-7.4      -08                               (Same as:           l



     (Bolus) IV      10:36:                               Isolyte S           He

rmann



               00                                 PH 7.4)           

 

     Phenergan      2018      No                       12.5 mg,           [facility]

rajeev



               1-08                               0.5 mL,           l



               10:35:                               Route:           [place]



               00                                 IVPB, Drug           



                                                  form: INJ,           



                                                  ONCE,           



                                                  Dosing           



                                                  Weight           



                                                  131.818,           



                                                  kg,            



                                                  Priority:           



                                                  STAT,           



                                                  Start           



                                                  date:           



                                                  18           



                                                  4:35:00           



                                                  CST, Stop           



                                                  date:           



                                                  18           



                                                  4:35:00           



                                                  CST            

 

     Phenergan      2018      No                       12.5 mg,           [facility]

rajeev



               1-08                               0.5 mL,           l



               10:35:                               Route:           [facility]



               00                                 IVPB, Drug           



                                                  form: INJ,           



                                                  ONCE,           



                                                  Dosing           



                                                  Weight           



                                                  131.818,           



                                                  kg,            



                                                  Priority:           



                                                  STAT,           



                                                  Start           



                                                  date:           



                                                  18           



                                                  4:35:00           



                                                  CST, Stop           



                                                  date:           



                                                  18           



                                                  4:35:00           



                                                  CST            

 

     Citalopram Citalopram       Yes  [place]                1 tablet     

      Common



     Hydrobromid Hydrobromid 7-16                                              S

pirit



     e    e    00:00:                                              - [facility]



                                                               [facility]

 

     Seroquel Seroquel 0      Yes  [place]                1 tablet         

  Common



               7-16                                              [facility]



               00:00:                                              - [facility]



                                                               [facility]

 

     Docusate            Yes                      100 mg = 1           [facility]

[facility]



     Sodium 100      5-08                               cap, PO,           l



     MG Oral      14:56:                               BID, 0           [facility]



     Capsule      00                                 Refill(s)           

 

     Zosyn            Yes                      0              [facility]



               5-08                               Refill(s)           l



               14:56:                                              [facility]



               00                                                

 

     celecoxib            Yes                      200 mg = 1           [facility]

[facility]



     200 mg oral      5-08                               cap, PO,           l



     capsule      14:56:                               BID, 0           [place]



               00                                 Refill(s)           

 

     ascorbic            Yes                      500 mg = 1           [facility]

[facility]



     acid      5-08                               tab, PO,           l



               14:56:                               BID, 0           [place]



               00                                 Refill(s)           

 

     acetaminoph            Yes                      1,000 mg =           

[facility]



     en 500 mg      5-08                               2 tab, PO,           l



     oral tablet      14:56:                               Q6Hnow, 0           [facility]

[facility]



               00                                 Refill(s)           

 

     Oxycodone            Yes                      5 mg = 1           [facility]

[facility]



     Hydrochlori      5-08                               tab, PO,           l



     de 5 MG      14:56:                               Q4H, PRN           [facility]

n



     Oral Tablet      00                                 Pain Score           



                                                  4-6, 0           



                                                  Refill(s)           

 

     zinc            Yes                      220 mg = 1           [facility]



     sulfate 220      5-08                               cap, PO,           l



     mg oral      14:56:                               Daily, 0           [facility]

n



     capsule      00                                 Refill(s)           

 

     multivitami            Yes                      1 tab, PO,           

[facility]



     n         5-08                               Daily, 0           l



               14:56:                               Refill(s)           [facility]



               00                                                

 

     methocarbam            Yes                      1,000 mg =           

[facility]



     ol 500 mg      5-08                               2 tab, PO,           l



     oral tablet      14:56:                               Q8H, 0           [facility]

[facility]



               00                                 Refill(s)           

 

     LORazepam            Yes                      0.5 mg = 1           [facility]

[facility]



     0.5 mg oral      5-08                               tab, PO,           l



     tablet      14:56:                               Q8H, PRN           [place]



               00                                 Anxiety, 0           



                                                  Refill(s)           

 

     Lidocaine            Yes                      3 patch,           [facility]

[facility]



     Hydrochlori      5-08                               TOP,           l



     de 0.05      14:56:                               Daily,           [place]



     MG/MG      00                                 Remove           



     Transdermal                                         after 12           



     Patch                                         hours, 0           



     [Lidoderm]                                         Refill(s)           

 

     Docusate            Yes                      100 mg = 1           [facility]

[facility]



     Sodium 100      5-08                               cap, PO,           l



     MG Oral      14:56:                               BID, 0           [place]



     Capsule      00                                 Refill(s)           

 

     Zosyn            Yes                      0              [facility]



               5-08                               Refill(s)           l



               14:56:                                              [facility]



               00                                                

 

     celecoxib            Yes                      200 mg = 1           [facility]

[facility]



     200 mg oral      5-08                               cap, PO,           l



     capsule      14:56:                               BID, 0           [facility]



               00                                 Refill(s)           

 

     ascorbic            Yes                      500 mg = 1           [facility]

[facility]



     acid      5-08                               tab, PO,           l



               14:56:                               BID, 0           [place]



               00                                 Refill(s)           

 

     acetaminoph            Yes                      1,000 mg =           

[facility]



     en 500 mg      5-08                               2 tab, PO,           l



     oral tablet      14:56:                               Q6Hnow, 0           [facility]

[facility]



               00                                 Refill(s)           

 

     Oxycodone            Yes                      5 mg = 1           [facility]

[facility]



     Hydrochlori      5-08                               tab, PO,           l



     de 5 MG      14:56:                               Q4H, PRN           [facility]

n



     Oral Tablet      00                                 Pain Score           



                                                  4-6, 0           



                                                  Refill(s)           

 

     zinc            Yes                      220 mg = 1           [facility]



     sulfate 220      5-08                               cap, PO,           l



     mg oral      14:56:                               Daily, 0           [facility]

n



     capsule      00                                 Refill(s)           

 

     multivitami            Yes                      1 tab, PO,           

[facility]



     n         5-08                               Daily, 0           l



               14:56:                               Refill(s)           [facility]



               00                                                

 

     methocarbam            Yes                      1,000 mg =           

[facility]



     ol 500 mg      5-08                               2 tab, PO,           l



     oral tablet      14:56:                               Q8H, 0           [facility]

[facility]



               00                                 Refill(s)           

 

     LORazepam            Yes                      0.5 mg = 1           [facility]

[facility]



     0.5 mg oral      5-08                               tab, PO,           l



     tablet      14:56:                               Q8H, PRN           [facility]



               00                                 Anxiety, 0           



                                                  Refill(s)           

 

     Lidocaine            Yes                      3 patch,           [facility]

[facility]



     Hydrochlori      5-08                               TOP,           l



     de 0.05      14:56:                               Daily,           [place]



     MG/MG      00                                 Remove           



     Transdermal                                         after 12           



     Patch                                         hours, 0           



     [Lidoderm]                                         Refill(s)           

 

     Robaxin            No                       Notes:           [facility]



               5-06                               (Same           l



               21:00:                               as:Robaxin           [facility]



               00                                 )              

 

     Robaxin            No                       Notes:           [facility]



               5-06                               (Same           l



               21:00:                               as:Robaxin           [place]



               00                                 )              

 

     Oxycodone            No                       Notes:           [facility]

a



     Hydrochlori      5-06                               (Same as:           l



     de 5 MG      18:06:                               Roxicodone           [facility]

j[facility]



     Oral Tablet      00                                 )              

 

     Oxycodone            No                       Notes:           [facility]

a



     Hydrochlori      5-06                               (Same as:           l



     de 5 MG      18:06:                               Roxicodone           [facility]

jorge



     Oral Tablet      00                                 )              

 

     Ativan            No                       Notes:           [facility]



               5-06                               (Same as:           l



               15:18:                               Ativan)           [place]



                                                               

 

     Ativan            No                       Notes:           [facility]



               5-06                               (Same as:           l



               15:18:                               Ativan)           [facility]



                                                               

 

     Trazodone            No                       Notes:           [facility]

a



     Hydrochlori      5-06                               (Same As:           l



     de 50 MG      02:00:                               Desyrel)           [place]

nn



     Oral Tablet      00                                                

 

     remove            No                       Notes:           [facility]



     patch      5-06                               Remove           l



               02:00:                               patch 12           [place]



               00                                 hours           



                                                  after           



                                                  applicatio           



                                                  n each           



                                                  day.           

 

     Trazodone            No                       Notes:           [facility]

a



     Hydrochlori      5-06                               (Same As:           l



     de 50 MG      02:00:                               Desyrel)           [place]

nn



     Oral Tablet      00                                                

 

     remove            No                       Notes:           [facility]



     patch      5-06                               Remove           l



               02:00:                               patch 12           [place]



               00                                 hours           



                                                  after           



                                                  applicatio           



                                                  n each           



                                                  day.           

 

     Oxycodone            No                       Notes:           [facility]

a



     Hydrochlori      5-05                               (Same as:           l



     de 5 MG      22:01:                               Roxicodone           [facility]

jorge



     Oral Tablet      00                                 )              

 

     Oxycodone            No                       Notes:           [facility]

a



     Hydrochlori      5-05                               (Same as:           l



     de 5 MG      22:01:                               Roxicodone           [facility]

jorge



     Oral Tablet      00                                 )              

 

     Celebrex            No                       Notes:           [facility]



               5-05                               NSAID.           l



               22:00:                               Please           [facility]



               00                                 check           



                                                  indication           



                                                  . Not for           



                                                  seizure.           



                                                  (Same As:           



                                                  CeleBREX)           

 

     Celebrex            No                       Notes:           [facility]



               5-05                               NSAID.           l



               22:00:                               Please           [facility]



               00                                 check           



                                                  indication           



                                                  . Not for           



                                                  seizure.           



                                                  (Same As:           



                                                  CeleBREX)           

 

     Vancomycin            No                         mg:           [facility]

[facility]



               5-05                               infuse           l



               21:00:                               over 2.5           [place]



               00                                 hours           

 

     Vancomycin            No                        2001 mg:           [facility]

[facility]



               5-05                               infuse           l



               21:00:                               over 2.5           [place]



               00                                 hours           

 

     Lidocaine            No                       Notes:           [facility]

a



     Hydrochlori      5-05                               Apply only           l



     de 0.05      14:00:                               once for           [facility]

n



     MG/MG      00                                 up to 12           



     Transdermal                                         hours in a           



     Patch                                         24-hour           



     [Lidoderm]                                         period (12           



                                                  hours on           



                                                  and 12           



                                                  hours           



                                                  off).           



                                                  (Same as:           



                                                  Lidoderm)           



                                                  "Remove           



                                                  old patch           



                                                  before           



                                                  applicatio           



                                                  n of new           



                                                  patch"           

 

     Lidocaine            No                       Notes:           [facility]

a



     Hydrochlori      5-05                               Apply only           l



     de 0.05      14:00:                               once for           [facility]

n



     MG/MG      00                                 up to 12           



     Transdermal                                         hours in a           



     Patch                                         24-hour           



     [Lidoderm]                                         period (12           



                                                  hours on           



                                                  and 12           



                                                  hours           



                                                  off).           



                                                  (Same as:           



                                                  Lidoderm)           



                                                  "Remove           



                                                  old patch           



                                                  before           



                                                  applicatio           



                                                  n of new           



                                                  patch"           

 

     Phenergan            No                       Notes: Do           [facility]

[facility]



               5-04                               not give           l



               22:40:                               IV push.           [facility]



                                                (Same as:           



                                                  Phenergan)           

 

     Dilaudid            No                       Notes:           [facility]



               5-04                               Same as           l



               22:40:                               Dilaudid           [place]



                                                               

 

     Phenergan            No                       Notes: Do           [facility]

[facility]



               5-04                               not give           l



               22:40:                               IV push.           [place]



                                                (Same as:           



                                                  Phenergan)           

 

     Dilaudid            No                       Notes:           [facility]



               5-04                               Same as           l



               22:40:                               Dilaudid                                                           

 

     Tramadol            No                       Notes: Not           [facility]

[facility]



               5-04                               to exceed           l



               22:00:                               400mg/day.           [place]



               00                                 (Same As:           



                                                  Ultram)           

 

     gabapentin            No                       Notes:           [facility]

ia



               5-04                               (Same as:           l



               22:00:                               Neurontin)           [place]                                                

 

     Acetaminoph            No                       Notes: Max           

[facility]



     en        5-04                               acetaminop           l



               22:00:                               hen 4000           [facility]                                 mg/day (4           



                                                  gm/day).           



                                                  (Same as:           



                                                  Tylenol           



                                                  Extra           



                                                  Strength)           

 

     Robaxin            No                       Notes:           [facility]



               5-04                               (Same           l



               22:00:                               as:Robaxin                                            )              

 

     Tramadol            No                       Notes: Not           [facility]

[facility]



               5-04                               to exceed           l



               22:00:                               400mg/day.           [facility]



               00                                 (Same As:           



                                                  Ultram)           

 

     gabapentin            No                       Notes:           [facility]

ia



               -04                               (Same as:           l



               22:00:                               Neurontin)           [place]                                                

 

     Acetaminoph            No                       Notes: Max           

[facility]



     en        5-04                               acetaminop           l



               22:00:                               hen 4000           [facility]



               00                                 mg/day (4           



                                                  gm/day).           



                                                  (Same as:           



                                                  Tylenol           



                                                  Extra           



                                                  Strength)           

 

     Robaxin            No                       Notes:           [facility]



               5-04                               (Same           l



               22:00:                               as:Robaxin                                            )              

 

     Oxycodone            No                       Notes:           [facility]

a



     Hydrochlori      5-04                               (Same as:           l



     de 5 MG      21:33:                               Roxicodone           [facility]

[facility]



     Oral Tablet      00                                 )              

 

     Oxycodone            No                       Notes:           [facility]

a



     Hydrochlori      5-04                               (Same as:           l



     de 5 MG      21:33:                               Roxicodone           [facility]

j[facility]



     Oral Tablet      00                                 )              

 

     Beneprotein            No                       Notes:           [facility]

[facility]



     7 gm pkt      5-04                               (Same as:           l



               21:30:                               Beneprotei           [facility]



               00                                 n)             

 

     Beneprotein            No                       Notes:           [facility]

rajeev



     7 gm pkt      5-04                               (Same as:           l



               21:30:                               Beneprotei           [facility]



               00                                 n)             

 

     Acetaminoph            No                       1 tab, PO,           

[facility]



     en 325 MG /      5-04                               TID, 0           l



     Oxycodone      17:12:                               Refill(s)           Her

child



     Hydrochlori      00                                                



     de 10 MG                                                        



     Oral Tablet                                                        



     [Percocet                                                        



     10/325]                                                        

 

     Acetaminoph            No                       1 tab, PO,           

[facility]



     en 325 MG /      5-04                               TID, 0           l



     Oxycodone      17:12:                               Refill(s)           Her

child



     Hydrochlori      00                                                



     de 10 MG                                                        



     Oral Tablet                                                        



     [Percocet                                                        



     10/325]                                                        

 

     Dilaudid            No                       0.5 mg,           [facility]

a



               5-04                               0.25 mL,           l



               16:01:                               Route:                                            IVP, Drug           



                                                  form: INJ,           



                                                  ONCE,           



                                                  Start           



                                                  date:           



                                                  18           



                                                  11:01:00           



                                                  CDT, Stop           



                                                  date:           



                                                  18           



                                                  11:01:00           



                                                  CDT            

 

     Dilaudid            No                       0.5 mg,           [facility]

a



               5-04                               0.25 mL,           l



               16:01:                               Route:                                            IVP, Drug           



                                                  form: INJ,           



                                                  ONCE,           



                                                  Start           



                                                  date:           



                                                  18           



                                                  11:01:00           



                                                  CDT, Stop           



                                                  date:           



                                                  18           



                                                  11:01:00           



                                                  CDT            

 

     Phenergan            No                       Notes:           [facility]

a



               5-04                               (Same as:           l



               15:43:                               Phenergan)                                                           

 

     Dilaudid            No                       2 mg,           [facility]



               5-04                               Route:           l



               15:43:                               IVP, ONCE,                                            Dosing           



                                                  Weight           



                                                  127.027,           



                                                  kg,            



                                                  Priority:           



                                                  STAT,           



                                                  Start           



                                                  date:           



                                                  18           



                                                  10:43:00           



                                                  CDT, Stop           



                                                  date:           



                                                  18           



                                                  10:43:00           



                                                  CDT            

 

     Phenergan            No                       Notes:           [facility]

a



               5-04                               (Same as:           l



               15:43:                               Phenergan)                                                           

 

     Dilaudid            No                       2 mg,           [facility]



               5-04                               Route:           l



               15:43:                               IVP, ONCE,                                            Dosing           



                                                  Weight           



                                                  127.027,           



                                                  kg,            



                                                  Priority:           



                                                  STAT,           



                                                  Start           



                                                  date:           



                                                  18           



                                                  10:43:00           



                                                  CDT, Stop           



                                                  date:           



                                                  18           



                                                  10:43:00           



                                                  CDT            

 

     Docusate            No                       Notes:           [facility]



               5-04                               (Same as:           l



               14:00:                               Colace)                                            (Do Not           



                                                  Crush)           

 

     Zinc            No                       Notes:           [facility]



     Sulfate      5-04                               (Zinc           l



               14:00:                               sulfate                                            capsule) -           



                                                  220 mg           



                                                  Zinc           



                                                  sulfate =           



                                                  50 mg           



                                                  elemental           



                                                  zinc Same           



                                                  as Zinc           



                                                  Sulfate           

 

     ascorbic            No                       Notes:           [facility]



     acid      5-04                               (Same as:           l



               14:00:                               Vitamin C)                                                           

 

     multivitami            No                       Notes:           [facility]

[facility]



     n         5-04                               (Same           l



               14:00:                               as:Thera)                                            WASTE: F/P           



                                                  - Black; E           



                                                  -              



                                                  Municipal           



                                                  Trash Bin           



                                                  Take with           



                                                  food.           

 

     Docusate            No                       Notes:           [facility]



               5-04                               (Same as:           l



               14:00:                               Colace)                                            (Do Not           



                                                  Crush)           

 

     Zinc            No                       Notes:           [facility]



     Sulfate      5-04                               (Zinc           l



               14:00:                               sulfate                                            capsule) -           



                                                  220 mg           



                                                  Zinc           



                                                  sulfate =           



                                                  50 mg           



                                                  elemental           



                                                  zinc Same           



                                                  as Zinc           



                                                  Sulfate           

 

     ascorbic            No                       Notes:           [facility]



     acid      5-04                               (Same as:           l



               14:00:                               Vitamin C)           [place]                                                

 

     multivitami            No                       Notes:           [facility]

rajeev



     n         -                               (Same           l



               14:00:                               as:Thera)                                            WASTE: F/P           



                                                  - Black; E           



                                                  -              



                                                  Municipal           



                                                  Trash Bin           



                                                  Take with           



                                                  food.           

 

     Naproxen            No                       Notes:           [facility]



               5-                               (Same as:           l



               07:00:                               Naprosyn)           [place]



                                                Take with           



                                                  food.           

 

     Zosyn            No                       Notes:           [facility]



               5-                               (Same as:           l



               07:00:                               Zosyn)           [place]                                 Dosing           



                                                  based on           



                                                  Piperacill           



                                                  in             



                                                  component           



                                                  ***            



                                                  MEDICATION           



                                                  WASTE ***           



                                                  Product           



                                                  Size: 3375           



                                                  mg Product           



                                                  Wasted:           



                                                  ___ mg           

 

     Vancomycin            No                        2001 mg:           [facility]

[facility]



               5-04                               infuse           l



               07:00:                               over 2.5           [place]



               00                                 hours For           



                                                  adult           



                                                  patients           



                                                  only:           



                                                  Round to           



                                                  nearest           



                                                  250 mg per           



                                                  Medical           



                                                  Staff           



                                                  approval           



                                                  ***            



                                                  MEDICATION           



                                                  WASTE ***           



                                                  Product           



                                                  Size: 1000           



                                                  mg Product           



                                                  Wasted:           



                                                  ___ mg           

 

     Enoxaparin            No                       Notes:           [facility]

[facility]



               -                               (Same as:           l



               07:00:                               Lovenox)           [facility]



                                                               

 

     Naproxen            No                       Notes:           [facility]



               -                               (Same as:           l



               07:00:                               Naprosyn)           [place]                                 Take with           



                                                  food.           

 

     Zosyn            No                       Notes:           [facility]



               -                               (Same as:           l



               07:00:                               Zosyn)           [facility]                                 Dosing           



                                                  based on           



                                                  Piperacill           



                                                  in             



                                                  component           



                                                  ***            



                                                  MEDICATION           



                                                  WASTE ***           



                                                  Product           



                                                  Size: 3375           



                                                  mg Product           



                                                  Wasted:           



                                                  ___ mg           

 

     Vancomycin            No                        2001 mg:           [facility]

[facility]



               -04                               infuse           l



               07:00:                               over 2.5           [place]



               00                                 hours For           



                                                  adult           



                                                  patients           



                                                  only:           



                                                  Round to           



                                                  nearest           



                                                  250 mg per           



                                                  Medical           



                                                  Staff           



                                                  approval           



                                                  ***            



                                                  MEDICATION           



                                                  WASTE ***           



                                                  Product           



                                                  Size: 1000           



                                                  mg Product           



                                                  Wasted:           



                                                  ___ mg           

 

     Enoxaparin            No                       Notes:           [facility]

[facility]



               5-04                               (Same as:           l



               07:00:                               Lovenox)           [place]



                                                               

 

     Sodium            No                       1,000 mL,           [facility]

a



     Chloride                                     Rate: 125           l



     0.9% IV      06:46:                               ml/hr,           [place]



     1,000 mL      00                                 Infuse           



                                                  over: 8           



                                                  hr, Route:           



                                                  IV, Dosing           



                                                  Weight           



                                                  127.27 kg,           



                                                  Total           



                                                  Volume:           



                                                  1,000,           



                                                  Start           



                                                  date:           



                                                  18           



                                                  1:46:00           



                                                  CDT,           



                                                  Duration:           



                                                  30 day,           



                                                  Stop date:           



                                                  18           



                                                  1:45:00           



                                                  CDT, 2.44,           



                                                  m2             

 

     Saline            No                       Notes:           [facility]



     Flush 0.9%      5-04                               (Same as:           l



               06:46:                               BD             [facility]



               00                                 Posiflush)           

 

     Acetaminoph            No                       Notes: Do           [facility]

[facility]



     en        5-04                               not exceed           l



               06:46:                               4 gm/day.           [facility]



               00                                 (Same as:           



                                                  Tylenol)           

 

     Acetaminoph            No                       Notes:           [facility]

[facility]



     en 325 MG /      5-04                               (Same as:           l



     Hydrocodone      06:46:                               Norco           [facility]

nn



     Bitartrate      00                                 325/5) Do           



     5 MG Oral                                         not exceed           



     Tablet                                         4gm/day of           



                                                  acetaminop           



                                                  hen.           

 

     Sodium            No                       1,000 mL,           [facility]

a



     Chloride      -04                               Rate: 125           l



     0.9% IV      06:46:                               ml/hr,           [place]



     1,000 mL      00                                 Infuse           



                                                  over: 8           



                                                  hr, Route:           



                                                  IV, Dosing           



                                                  Weight           



                                                  127.27 kg,           



                                                  Total           



                                                  Volume:           



                                                  1,000,           



                                                  Start           



                                                  date:           



                                                  18           



                                                  1:46:00           



                                                  CDT,           



                                                  Duration:           



                                                  30 day,           



                                                  Stop date:           



                                                  18           



                                                  1:45:00           



                                                  CDT, 2.44,           



                                                  m2             

 

     Saline            No                       Notes:           [facility]



     Flush 0.9%      5-04                               (Same as:           l



               06:46:                               BD             [place]



               00                                 Posiflush)           

 

     Acetaminoph            No                       Notes: Do           M

[facility]



     en        5-04                               not exceed           l



               06:46:                               4 gm/day.           [facility]



               00                                 (Same as:           



                                                  Tylenol)           

 

     Acetaminoph            No                       Notes:           [facility]

[facility]



     en 325 MG /      5-04                               (Same as:           l



     Hydrocodone      06:46:                               Norco           [place]

nn



     Bitartrate      00                                 325/5) Do           



     5 MG Oral                                         not exceed           



     Tablet                                         4gm/day of           



                                                  acetaminop           



                                                  hen.           

 

     Reglan            No                       Notes:           [facility]



               5-04                               (Same as:           l



               04:27:                               Reglan)           [place]



               00                                                

 

     Benadryl            No                       Notes:           [facility]



               5-04                               (Same as:           l



               04:27:                               Benadryl)           [facility]



               00                                                

 

     Reglan            No                       Notes:           [facility]



               5-04                               (Same as:           l



               04:27:                               Reglan)           [facility]



                                                               

 

     Benadryl            No                       Notes:           [facility]



               5-04                               (Same as:           l



               04:27:                               Benadryl)           [facility]



                                                               

 

     Magnesium            No                       Notes:           [facility]

a



     Sulfate      -                               WASTE: F/P           l



               04:26:                               - Sink; E           [place]                                 -              



                                                  Municipal           



                                                  Trash Bin           

 

     Sodium            No                       1,000 mL,           [facility]

a



     Chloride      5-04                               1000           l



     0.9%      04:26:                               ml/hr,           [place]



     (Bolus) IV      00                                 Infuse           



                                                  Over: 1           



                                                  hr, Route:           



                                                  IV, 1,000,           



                                                  Drug form:           



                                                  INJ, ONCE,           



                                                  Priority:           



                                                  STAT,           



                                                  Dosing           



                                                  Weight           



                                                  127.273           



                                                  kg, Start           



                                                  date:           



                                                  18           



                                                  23:26:00           



                                                  CDT, Stop           



                                                  date:           



                                                  18           



                                                  23:26:00           



                                                  CDT            

 

     Magnesium            No                       Notes:           [facility]

a



     Sulfate                                     WASTE: F/P           l



               04:26:                               - Sink; E                                            -              



                                                  Municipal           



                                                  Trash Bin           

 

     Sodium            No                       1,000 mL,           [facility]

a



     Chloride      -04                               1000           l



     0.9%      04:26:                               ml/hr,           [place]



     (Bolus) IV      00                                 Infuse           



                                                  Over: 1           



                                                  hr, Route:           



                                                  IV, 1,000,           



                                                  Drug form:           



                                                  INJ, ONCE,           



                                                  Priority:           



                                                  STAT,           



                                                  Dosing           



                                                  Weight           



                                                  127.273           



                                                  kg, Start           



                                                  date:           



                                                  18           



                                                  23:26:00           



                                                  CDT, Stop           



                                                  date:           



                                                  18           



                                                  23:26:00           



                                                  CDT            

 

     Zosyn      -      No                       4.5 gm,           [facility]



                                              Route:           l



               04:10:                               IVPB,           [place]



               00                                 ONCE,           



                                                  Dosing           



                                                  Weight           



                                                  127.273,           



                                                  kg,            



                                                  Priority:           



                                                  STAT,           



                                                  Start           



                                                  date:           



                                                  18           



                                                  23:10:00           



                                                  CDT, Stop           



                                                  date:           



                                                  18           



                                                  23:10:00           



                                                  CDT, ABX           



                                                  Indication           



                                                  :              



                                                  Bacteremia           

 

     Vancomycin            No                         mg:           [facility]

[facility]



               -                               infuse           l



               04:10:                               over 2.5           [place]



               00                                 hours For           



                                                  adult           



                                                  patients           



                                                  only:           



                                                  Round to           



                                                  nearest           



                                                  250 mg per           



                                                  Medical           



                                                  Staff           



                                                  approval           



                                                  ***            



                                                  MEDICATION           



                                                  WASTE ***           



                                                  Product           



                                                  Size: 1000           



                                                  mg Product           



                                                  Wasted:           



                                                  ___ mg           

 

     Zosyn      -      No                       4.5 gm,           [facility]



                                              Route:           l



               04:10:                               IVPB,           [place]



               00                                 ONCE,           



                                                  Dosing           



                                                  Weight           



                                                  127.273,           



                                                  kg,            



                                                  Priority:           



                                                  STAT,           



                                                  Start           



                                                  date:           



                                                  18           



                                                  23:10:00           



                                                  CDT, Stop           



                                                  date:           



                                                  18           



                                                  23:10:00           



                                                  CDT, ABX           



                                                  Indication           



                                                  :              



                                                  Bacteremia           

 

     Vancomycin            No                         mg:           [facility]

[facility]



               5-04                               infuse           l



               04:10:                               over 2.5           [place]



               00                                 hours For           



                                                  adult           



                                                  patients           



                                                  only:           



                                                  Round to           



                                                  nearest           



                                                  250 mg per           



                                                  Medical           



                                                  Staff           



                                                  approval           



                                                  ***            



                                                  MEDICATION           



                                                  WASTE ***           



                                                  Product           



                                                  Size: 1000           



                                                  mg Product           



                                                  Wasted:           



                                                  ___ mg           

 

     normal            No                       1,000 mL,           [facility]

a



     saline 0.9%                                     Rate: 75           l



     IV 1,000 mL      03:39:                               ml/hr,           [facility]

[facility]



               00                                 Infuse           



                                                  over: 13.3           



                                                  hr, Route:           



                                                  IV, Dosing           



                                                  Weight           



                                                  127.273           



                                                  kg, Total           



                                                  Volume:           



                                                  1,000,           



                                                  Start           



                                                  date:           



                                                  18           



                                                  22:39:00           



                                                  CDT,           



                                                  Duration:           



                                                  30 day,           



                                                  Stop date:           



                                                  18           



                                                  22:38:00           



                                                  CDT, 2.36,           



                                                  m2             

 

     normal            No                       1,000 mL,           [facility]

a



     saline 0.9%                                     Rate: 75           l



     IV 1,000 mL      03:39:                               ml/hr,           [facility]

[facility]



                                                Infuse           



                                                  over: 13.3           



                                                  hr, Route:           



                                                  IV, Dosing           



                                                  Weight           



                                                  127.273           



                                                  kg, Total           



                                                  Volume:           



                                                  1,000,           



                                                  Start           



                                                  date:           



                                                  18           



                                                  22:39:00           



                                                  CDT,           



                                                  Duration:           



                                                  30 day,           



                                                  Stop date:           



                                                  18           



                                                  22:38:00           



                                                  CDT, 2.36,           



                                                  m2             

 

     Acetaminoph            No                       Notes: Max           

[facility]



     en        -                               acetaminop           l



               02:56:                               hen 4000           [facility]



               00                                 mg/day (4           



                                                  gm/day).           



                                                  (Same as:           



                                                  Tylenol           



                                                  Extra           



                                                  Strength)           

 

     Acetaminoph            No                       Notes: Max           

[facility]



     en        -                               acetaminop           l



               02:56:                               hen 4000           [facility]



               00                                 mg/day (4           



                                                  gm/day).           



                                                  (Same as:           



                                                  Tylenol           



                                                  Extra           



                                                  Strength)           

 

     Rocephin            No                       Notes:           [facility]



               -04                               (Same As:           l



               02:21:                               Rocephin).           [facility]



               00                                 ***            



                                                  MEDICATION           



                                                  WASTE ***           



                                                  Product           



                                                  Size: 1000           



                                                  mg Product           



                                                  Wasted:           



                                                  _0__ mg           

 

     Rocephin            No                       Notes:           [facility]



               5-04                               (Same As:           l



               02:21:                               Rocephin).                                            ***            



                                                  MEDICATION           



                                                  WASTE ***           



                                                  Product           



                                                  Size: 1000           



                                                  mg Product           



                                                  Wasted:           



                                                  _0__ mg           

 

     Morphine      -0      No                       4 mg,           [facility]



               5-                               Route:           l



               00:05:                               IVP, ONCE,                                            Dosing           



                                                  Weight           



                                                  127.273,           



                                                  kg,            



                                                  Priority:           



                                                  STAT,           



                                                  Start           



                                                  date:           



                                                  18           



                                                  19:05:00           



                                                  CDT, Stop           



                                                  date:           



                                                  18           



                                                  19:05:00           



                                                  CDT            

 

     Morphine      -0      No                       4 mg,           [facility]



               5                               Route:           l



               00:05:                               IVP, ONCE,                                            Dosing           



                                                  Weight           



                                                  127.273,           



                                                  kg,            



                                                  Priority:           



                                                  STAT,           



                                                  Start           



                                                  date:           



                                                  18           



                                                  19:05:00           



                                                  CDT, Stop           



                                                  date:           



                                                  18           



                                                  19:05:00           



                                                  CDT            

 

     Benadryl            No                       Notes:           [facility]



               5-03                               (Same as:           l



               23:14:                               Benadryl)                                                           

 

     Benadryl            No                       Notes:           [facility]



               5-03                               (Same as:           l



               23:14:                               Benadryl)                                                           

 

     Benadryl            No                       Notes:           [facility]



               5-03                               (Same as:           l



               22:56:                               Benadryl)                                                           

 

     Morphine      -0      No                       4 mg, 1           [facility]

a



               5-03                               mL, Route:           l



               22:56:                               IVP, Drug                                            form:           



                                                  SOLN,           



                                                  ONCE,           



                                                  Dosing           



                                                  Weight           



                                                  127.273,           



                                                  kg,            



                                                  Priority:           



                                                  STAT,           



                                                  Start           



                                                  date:           



                                                  18           



                                                  17:56:00           



                                                  CDT, Stop           



                                                  date:           



                                                  18           



                                                  17:56:00           



                                                  CDT            

 

     Benadryl            No                       Notes:           [facility]



               5-03                               (Same as:           l



               22:56:                               Benadryl)                                                           

 

     Morphine      0      No                       4 mg, 1           [facility]

a



               5-03                               mL, Route:           l



               22:56:                               IVP, Drug                                            form:           



                                                  SOLN,           



                                                  ONCE,           



                                                  Dosing           



                                                  Weight           



                                                  127.273,           



                                                  kg,            



                                                  Priority:           



                                                  STAT,           



                                                  Start           



                                                  date:           



                                                  18           



                                                  17:56:00           



                                                  CDT, Stop           



                                                  date:           



                                                  18           



                                                  17:56:00           



                                                  CDT            

 

     OXYCODONE      2017-      Yes                      Take by           [facility]

rs



     HCL/ACETAMI      2-20                               mouth.           ity of



     NOPHEN      10:03:                                              [place]



     (PERCOCET      17                                                Medical



     [facility])                                                        Branch

 

     OXYCODONE      2017      Yes                      Take by           Unive

rs



     HCL/ACETAMI      2-20                               mouth.           ity of



     NOPHEN      04:03:                                              [place]



     (PERCOCET      17                                                Medical



     [facility])                                                        Branch

 

     OXYCODONE      2017      Yes                      Take by           Unive

rs



     HCL/ACETAMI      2-20                               mouth.           ity of



     NOPHEN      04:03:                                              [place]



     (PERCOCET      17                                                Medical



     [facility])                                                        Branch

 

     OXYCODONE      2017      Yes                      Take by           Unive

rs



     HCL/ACETAMI      2-20                               mouth.           ity of



     NOPHEN      04:03:                                              [place]



     (PERCOCET      17                                                Medical



     [facility])                                                        Branch

 

     OXYCODONE      2017      Yes                      Take by           Unive

rs



     HCL/ACETAMI      2-20                               mouth.           ity of



     NOPHEN      04:03:                                              [place]



     (PERCOCET      17                                                Medical



     [facility])                                                        Branch

 

     dicyclomine      2017      Yes            20mg      Take 1           Univ

ers



     (BENTYL) 20      2-20                               tablet by           ity

 of



     mg tablet      00:00:                               mouth 4           [place]



               00                                 (four)           Medical



                                                  times           Branch



                                                  daily.           

 

     proMETHazin      2017      Yes            25mg      Take 1           Univ

ers



     e 25 mg      2-20                               tablet by           ity of



     tablet      00:00:                               mouth           [place]



               00                                 every 6           Medical



                                                  (six)           Branch



                                                  hours as           



                                                  needed for           



                                                  Nausea and           



                                                  Vomiting           



                                                  (N/V).           

 

     proMETHazin      2017      Yes            25mg      Take 1           Univ

ers



     e 25 mg      2-20                               tablet by           ity of



     tablet      00:00:                               mouth           [place]



               00                                 every 6           Medical



                                                  (six)           Branch



                                                  hours as           



                                                  needed for           



                                                  Nausea and           



                                                  Vomiting           



                                                  (N/V).           

 

     proMETHazin      2017      Yes            25mg      Take 1           Univ

ers



     e 25 mg      2-20                               tablet by           ity of



     tablet      00:00:                               mouth           [place]



               00                                 every 6           Medical



                                                  (six)           Branch



                                                  hours as           



                                                  needed for           



                                                  Nausea and           



                                                  Vomiting           



                                                  (N/V).           

 

     dicyclomine      2017      Yes            20mg      Take 1           Univ

ers



     (BENTYL) 20      2-20                               tablet by           ity

 of



     mg tablet      00:00:                               mouth 4           [place]



               00                                 (four)           Medical



                                                  times           Branch



                                                  daily.           

 

     proMETHazin      2017      Yes            25mg      Take 1           Univ

ers



     e 25 mg      2-20                               tablet by           ity of



     tablet      00:00:                               mouth           [place]



               00                                 every 6           Medical



                                                  (six)           Branch



                                                  hours as           



                                                  needed for           



                                                  Nausea and           



                                                  Vomiting           



                                                  (N/V).           

 

     dicyclomine      -      Yes            20mg      Take 1           Univ

ers



     (BENTYL) 20      2-20                               tablet by           ity

 of



     mg tablet      00:00:                               mouth 4           [place]



               00                                 (four)           Medical



                                                  times           Branch



                                                  daily.           

 

     proMETHazin      2017      Yes            25mg      Take 1           Univ

ers



     e 25 mg      2-20                               tablet by           ity of



     tablet      00:00:                               mouth           [place]



               00                                 every 6           Medical



                                                  (six)           Branch



                                                  hours as           



                                                  needed for           



                                                  Nausea and           



                                                  Vomiting           



                                                  (N/V).           

 

     proMETHazin      2017- No             25mg      Take 1           Uni

vers



     e 25 mg      2-20 01-25                          tablet by           ity of



     tablet      00:00: 00:00                          mouth           [place]



               00   :00                           every 6           Medical



                                                  (six)           Branch



                                                  hours as           



                                                  needed for           



                                                  Nausea and           



                                                  Vomiting           



                                                  (N/V).           

 

     dicyclomine      2017- No             20mg      Take 1           Uni

vers



     (BENTYL) 20      2-20 01-15                          tablet by           it

y of



     mg tablet      00:00: 00:00                          mouth 4           [place]

[facility]



               00   :00                           (four)           Medical



                                                  times           Branch



                                                  daily.           

 

     proMETHazin      -      Yes            25mg      Take 1           Univ

ers



     e 25 mg      1-04                               tablet by           ity of



     tablet      00:00:                               mouth           [place]



               00                                 every 6           Medical



                                                  (six)           Branch



                                                  hours as           



                                                  needed for           



                                                  Nausea and           



                                                  Vomiting           



                                                  (N/V) for           



                                                  up to 12           



                                                  doses.           

 

     proMETHazin      -0      Yes            25mg      Take 1           Univ

ers



     e 25 mg      1-04                               tablet by           ity of



     tablet      00:00:                               mouth           [place]



               00                                 every 6           Medical



                                                  (six)           Branch



                                                  hours as           



                                                  needed for           



                                                  Nausea and           



                                                  Vomiting           



                                                  (N/V) for           



                                                  up to 12           



                                                  doses.           

 

     proMETHazin      -      Yes            25mg      Take 1           Univ

ers



     e 25 mg      1-04                               tablet by           ity of



     tablet      00:00:                               mouth           [place]



               00                                 every 6           Medical



                                                  (six)           Branch



                                                  hours as           



                                                  needed for           



                                                  Nausea and           



                                                  Vomiting           



                                                  (N/V) for           



                                                  up to 12           



                                                  doses.           

 

     proMETHazin      2017-0      Yes            25mg      Take 1           Univ

ers



     e 25 mg      1-04                               tablet by           ity of



     tablet      00:00:                               mouth           [place]



               00                                 every 6           Medical



                                                  (six)           Branch



                                                  hours as           



                                                  needed for           



                                                  Nausea and           



                                                  Vomiting           



                                                  (N/V) for           



                                                  up to 12           



                                                  doses.           

 

     proMETHazin      2017-0      Yes            25mg      Take 1           Univ

ers



     e 25 mg      1-04                               tablet by           ity of



     tablet      00:00:                               mouth           [place]



               00                                 every 6           Medical



                                                  (six)           Branch



                                                  hours as           



                                                  needed for           



                                                  Nausea and           



                                                  Vomiting           



                                                  (N/V) for           



                                                  up to 12           



                                                  doses.           

 

     proMETHazin      2017-0      Yes            25mg      Take 1           Univ

ers



     e 25 mg      1-04                               tablet by           ity of



     tablet      00:00:                               mouth           [place]



               00                                 every 6           Medical



                                                  (six)           Branch



                                                  hours as           



                                                  needed for           



                                                  Nausea and           



                                                  Vomiting           



                                                  (N/V) for           



                                                  up to 12           



                                                  doses.           

 

     proMETHazin      -      Yes            25mg      Take 1           Univ

ers



     e 25 mg      1-04                               tablet by           ity of



     tablet      00:00:                               mouth           [place]



               00                                 every 6           Medical



                                                  (six)           Branch



                                                  hours as           



                                                  needed for           



                                                  Nausea and           



                                                  Vomiting           



                                                  (N/V) for           



                                                  up to 12           



                                                  doses.           

 

     proMETHazin      -      Yes            25mg      Take 1           Univ

ers



     e 25 mg      1-04                               tablet by           ity of



     tablet      00:00:                               mouth           [place]



               00                                 every 6           Medical



                                                  (six)           Branch



                                                  hours as           



                                                  needed for           



                                                  Nausea and           



                                                  Vomiting           



                                                  (N/V) for           



                                                  up to 12           



                                                  doses.           

 

     proMETHazin      -      Yes            25mg      Take 1           Univ

ers



     e 25 mg      1-04                               tablet by           ity of



     tablet      00:00:                               mouth           [place]



               00                                 every 6           Medical



                                                  (six)           Branch



                                                  hours as           



                                                  needed for           



                                                  Nausea and           



                                                  Vomiting           



                                                  (N/V) for           



                                                  up to 12           



                                                  doses.           

 

     proMETHazin      2022- No             25mg      Take 1           Uni

vers



     e 25 mg      1-04 05-11                          tablet by           ity of



     tablet      00:00: 00:00                          mouth           [place]



               00   :00                           every 6           Medical



                                                  (six)           Branch



                                                  hours as           



                                                  needed for           



                                                  Nausea and           



                                                  Vomiting           



                                                  (N/V) for           



                                                  up to 12           



                                                  doses.           

 

     Tylenol Tylenol           Yes  [facility] [place]                1 tablet           [facility]

m[facility]



     with with                                    as needed           [facility]



     Codeine #4 Codeine #4                                                   - C

HI



                                                                 [facility]

 

     Macrobid Macrobid           Yes  [facility] [place]                1 capsule          

 Common



                                                  with food           [facility]

 

     Pantoprazol Pantoprazol           Yes  [facility] [place]                1 tablet     

      Common



     e Sodium e Sodium                                                   [facility]



                                                                 [facility]

 

     Collagenase Collagenase           Yes  [facility] [place]                1            

  Common



                                                  applicatio           [facility]



                                                  n to           - [facility]



                                                  affected           [facility]







Vital Signs







             Vital Name   Observation Time Observation Value Comments     Source

 

             Systolic blood 2022 17:14:00 130 mm[Hg]                [facility]

sity [facility]

 

             Diastolic blood 2022 17:14:00 83 mm[facility]Hg]                 [facility]

 

             Heart rate   2022 17:14:00 100 /min                  [facility]

 

             Body temperature 2022 17:14:00 36.72 Tiffanie                 [facility]

 

             Respiratory rate 2022 17:14:00 20 /min                   Univ

ersity of



                                                                 [place] [facility]



                                                                 Branch

 

             Oxygen saturation in 2022 17:14:00 96 /min                   

[facility]



             Arterial blood by                                        [place] [facility]

[facility]



             Pulse oximetry                                        Branch

 

             Body height  2022 10:00:00 149.9 cm                  [facility]

[facility] of



                                                                 [facility]



                                                                 Branch

 

             Body weight  2022 10:00:00 123 kg                    [facility]

[facility] of



                                                                 [facility]



                                                                 Branch

 

             BMI          2022 10:00:00 54.77 kg/m2               [facility]

ty of



                                                                 [facility]



                                                                 Branch

 

             Systolic blood 2022 12:40:00 107 mm[Hg]                [facility]

sity of



             pressure                                            [facility]



                                                                 Branch

 

             Diastolic blood 2022 12:40:00 64 mm[Hg]                 Unive

rsity of



             pressure                                            [facility]



                                                                 Branch

 

             Heart rate   2022 12:40:00 99 /min                   [facility]

ty of



                                                                 [place] [facility]



                                                                 Branch

 

             Body temperature 2022 12:40:00 36.83 Tiffanie                 Univ

ersity of



                                                                 [place] [facility]



                                                                 Branch

 

             Respiratory rate 2022 12:40:00 18 /min                   Univ

ersity of



                                                                 [facility]



                                                                 Branch

 

             Oxygen saturation in 2022 12:40:00 95 /min                   

[facility]



             Arterial blood by                                        [place] [facility]

sar[facility]



             Pulse oximetry                                        Branch

 

             Body height  2022 10:00:00 149.9 cm                  [facility]

ty of



                                                                 [facility]



                                                                 Branch

 

             Body weight  2022 10:00:00 123 kg                    [facility]

ty of



                                                                 [facility]



                                                                 Branch

 

             BMI          2022 10:00:00 54.77 kg/m2               [facility]

ty of



                                                                 [facility]



                                                                 Branch

 

             Heart rate   2022 23:00:00 91 /min                   [facility]

ty of



                                                                 [place] [facility]



                                                                 Branch

 

             Oxygen saturation in 2022 23:00:00 95 /min                   

[facility]



             Arterial blood by                                        [place] [facility]

sarah



             Pulse oximetry                                        Branch

 

             Systolic blood 2022 22:02:00 134 mm[Hg]                Univer

sity of



             pressure                                            [facility]



                                                                 Branch

 

             Diastolic blood 2022 22:02:00 83 mm[Hg]                 Unive

rsity of



             pressure                                            [facility]



                                                                 Branch

 

             Body temperature 2022 21:00:00 36.83 Tiffanie                 Univ

ersity of



                                                                 [facility]



                                                                 Branch

 

             Respiratory rate 2022 21:00:00 28 /min                   Univ

ersity of



                                                                 [facility]



                                                                 Branch

 

             Body weight  2022 09:00:00 134.99 kg                 [facility]

ty of



                                                                 [facility]



                                                                 [facility]

 

             BMI          2022 09:00:00 60.08 kg/m2               [facility]

[facility]



                                                                 [facility]



                                                                 [facility]

 

             Body height  2022 22:22:00 149.9 cm                  [facility]

[facility]



                                                                 [facility]



                                                                 [facility]

 

             Systolic blood 2022 03:38:00 126 mm[Hg]                [facility]

[facility]ity of



             pressure                                            [facility]



                                                                 [facility]

 

             Diastolic blood 2022 03:38:00 90 mm[Hg]                 [facility]

rsity of



             pressure                                            [facility]



                                                                 [facility]

 

             Heart rate   2022 03:38:00 97 /min                   [facility]

[facility]



                                                                 [facility]



                                                                 [facility]

 

             Respiratory rate 2022 03:38:00 18 /min                   [facility]

[facility]



                                                                 [facility]



                                                                 [facility]

 

             Oxygen saturation in 2022 03:38:00 97 /min                   

[facility]



             Arterial blood by                                        [facility]

[facility]



             Pulse oximetry                                        [facility]

 

             Body temperature 2022-07-15 22:09:00 36.94 Tiffanie                 [facility]

[facility]



                                                                 [facility]



                                                                 [facility]

 

             Body height  2022-07-15 22:09:00 149.9 cm                  [facility]

[facility]



                                                                 [facility]



                                                                 [facility]

 

             Body weight  2022-07-15 22:09:00 127.007 kg                [facility]

[facility]



                                                                 [facility]



                                                                 [facility]

 

             BMI          2022-07-15 22:09:00 56.55 kg/m2               [facility]

[facility]



                                                                 [facility]



                                                                 [facility]

 

             Systolic blood 2022 11:00:00 143 mm[Hg]                [facility]

[facility]ity of



             pressure                                            [facility]



                                                                 [facility]

 

             Diastolic blood 2022 11:00:00 91 mm[Hg]                 [facility]

[facility]ity of



             pressure                                            [facility]



                                                                 [facility]

 

             Heart rate   2022 11:00:00 100 /min                  [facility]

[facility]



                                                                 [facility]



                                                                 [facility]

 

             Respiratory rate 2022 11:00:00 16 /min                   [facility]

[facility]



                                                                 [facility]



                                                                 [facility]

 

             Oxygen saturation in 2022 11:00:00 96 /min                   

[facility]



             Arterial blood by                                        [facility]

[facility]



             Pulse oximetry                                        [facility]

 

             Body temperature 2022 09:55:00 36.89 Tiffanie                 [facility]

[facility]



                                                                 [facility]



                                                                 [facility]

 

             Body height  2022 09:55:00 149.9 cm                  [facility]

[facility]



                                                                 [facility]



                                                                 [facility]

 

             Body weight  2022 09:55:00 127.007 kg                [facility]

[facility]



                                                                 [facility]



                                                                 [facility]

 

             BMI          2022 09:55:00 56.55 kg/m2               [facility]

[facility]



                                                                 [facility]



                                                                 [facility]

 

             Systolic blood 2022 00:43:00 132 mm[Hg]                [facility]

[facility]ity of



             pressure                                            [facility]



                                                                 [facility]

 

             Diastolic blood 2022 00:43:00 88 mm[Hg]                 [facility]

[facility]ity of



             pressure                                            [facility]



                                                                 [facility]

 

             Heart rate   2022 00:43:00 107 /min                  [facility]

[facility]



                                                                 [facility]



                                                                 [facility]

 

             Body temperature 2022 00:43:00 36.33 Tiffanie                 [facility]

[facility]



                                                                 [facility]



                                                                 [facility]

 

             Respiratory rate 2022 00:43:00 18 /min                   [facility]

[facility]



                                                                 [facility]



                                                                 [facility]

 

             Oxygen saturation in 2022 00:43:00 95 /min                   

[facility]



             Arterial blood by                                        [facility]

[facility]



             Pulse oximetry                                        [facility]

 

             Body height  2022 11:31:00 149.9 cm                  [facility]

[facility]



                                                                 [facility]



                                                                 [facility]

 

             Body weight  2022 11:31:00 127.007 kg                [facility]

[facility]



                                                                 [facility]



                                                                 [facility]

 

             BMI          2022 11:31:00 56.55 kg/m2               [facility]

[facility]



                                                                 [facility]



                                                                 [facility]

 

             Body temperature 2022 16:10:00 36.22 Tiffanie                 [facility]

[facility]



                                                                 [facility]



                                                                 [facility]

 

             Respiratory rate 2022 16:10:00 16 /min                   [facility]

[facility] of



                                                                 [facility]



                                                                 [facility]

 

             Oxygen saturation in 2022 16:10:00 96 /min                   

[facility]



             Arterial blood by                                        [facility]

[facility]



             Pulse oximetry                                        [facility]

 

             Systolic blood 2022 16:10:00 127 mm[Hg]                [facility]

[facility]ity of



             pressure                                            [facility]



                                                                 [facility]

 

             Diastolic blood 2022 16:10:00 84 mm[Hg]                 [facility]

[facility]ity of



             pressure                                            [facility]



                                                                 [facility]

 

             Heart rate   2022 16:10:00 98 /min                   [facility]

[facility]



                                                                 [facility]



                                                                 [facility]

 

             Body weight  2022 08:54:00 133.494 kg                [facility]

[facility]



                                                                 [facility]



                                                                 [facility]

 

             BMI          2022 08:54:00 50.52 kg/m2               [facility]

[facility]



                                                                 [facility]



                                                                 [facility]

 

             Body height  2022 03:01:00 162.6 cm                  [facility]

[facility]



                                                                 [facility]



                                                                 [facility]

 

             Systolic blood 2022-06-10 17:03:00 132 mm[Hg]                [facility]

[facility]ity of



             pressure                                            [facility]



                                                                 [facility]

 

             Diastolic blood 2022-06-10 17:03:00 90 mm[Hg]                 [facility]

[facility] of



             pressure                                            [facility]



                                                                 [facility]

 

             Heart rate   2022-06-10 17:03:00 78 /min                   [facility]

[facility]



                                                                 [facility]



                                                                 [facility]

 

             Body temperature 2022-06-10 17:03:00 35.83 Tiffanie                 [facility]

ers[facility] of



                                                                 [facility]



                                                                 [facility]

 

             Respiratory rate 2022-06-10 17:03:00 14 /min                   [facility]

[facility] of



                                                                 [facility]



                                                                 [facility]

 

             Oxygen saturation in 2022-06-10 17:03:00 93 /min                   

[facility]



             Arterial blood by                                        [facility]

[facility]



             Pulse oximetry                                        [facility]

 

             Body weight  2022-06-10 09:00:00 140.933 kg                [facility]

[facility]



                                                                 [facility]



                                                                 [facility]

 

             BMI          2022-06-10 09:00:00 62.75 kg/m2               [facility]

[facility]



                                                                 [facility]



                                                                 [facility]

 

             Body height  2022 12:47:00 149.9 cm                  [facility]

[facility]



                                                                 [facility]



                                                                 [facility]

 

             Systolic blood 2022 20:40:00 125 mm[Hg]                [facility]

[facility]ity of



             pressure                                            [facility]



                                                                 [facility]

 

             Diastolic blood 2022 20:40:00 75 mm[Hg]                 [facility]

[facility]ity of



             pressure                                            [facility]



                                                                 [facility]

 

             Heart rate   2022 20:40:00 99 /min                   [facility]

[facility]



                                                                 [facility]



                                                                 [facility]

 

             Body temperature 2022 20:40:00 36.67 Tiffanie                 [facility]

[facility]



                                                                 [facility]



                                                                 [facility]

 

             Respiratory rate 2022 20:40:00 20 /min                   [facility]

[facility] of



                                                                 [facility]



                                                                 [facility]

 

             Oxygen saturation in 2022 20:40:00 93 /min                   

[facility]



             Arterial blood by                                        [facility]

[facility]



             Pulse oximetry                                        [facility]

 

             Body weight  2022 22:13:00 137.485 kg                [facility]

[facility]



                                                                 [facility]



                                                                 [facility]

 

             BMI          2022 22:13:00 61.22 kg/m2               [facility]

[facility]



                                                                 [facility]



                                                                 [facility]

 

             Body height  2022 09:10:00 149.9 cm                  [facility]

[facility]



                                                                 [facility]



                                                                 [facility]

 

             Systolic blood 2022 15:49:00 111 mm[Hg]                [facility]

[facility]ity of



             pressure                                            [facility]



                                                                 [facility]

 

             Diastolic blood 2022 15:49:00 76 mm[Hg]                 [facility]

[facility] of



             pressure                                            [facility]



                                                                 [facility]

 

             Heart rate   2022 15:49:00 109 /min                  [facility]

[facility]



                                                                 [facility]



                                                                 [facility]

 

             Body temperature 2022 15:49:00 36.06 Tiffanie                 [facility]

[facility]



                                                                 [facility]



                                                                 [facility]

 

             Respiratory rate 2022 15:49:00 18 /min                   [facility]

[facility] of



                                                                 [facility]



                                                                 [facility]

 

             Oxygen saturation in 2022 15:49:00 92 /min                   

[facility]



             Arterial blood by                                        [facility]

[facility]



             Pulse oximetry                                        [facility]

 

             Body weight  2022 10:47:00 139.481 kg                [facility]

[facility]



                                                                 [facility]



                                                                 [facility]

 

             BMI          2022 10:47:00 62.11 kg/m2               [facility]

[facility]



                                                                 [facility]



                                                                 [facility]

 

             Body height  2022 06:57:00 149.9 cm                  [facility]

[facility]



                                                                 [facility]



                                                                 [facility]

 

             Systolic blood 2022 06:00:00 144 mm[Hg]                [facility]

[facility]ity of



             pressure                                            [facility]



                                                                 [facility]

 

             Diastolic blood 2022 06:00:00 93 mm[Hg]                 [facility]

[facility]ity of



             pressure                                            [facility]



                                                                 [facility]

 

             Heart rate   2022 06:00:00 92 /min                   [facility]

[facility]



                                                                 [facility]



                                                                 [facility]

 

             Respiratory rate 2022 06:00:00 22 /min                   [facility]

[facility]



                                                                 [facility]



                                                                 [facility]

 

             Oxygen saturation in 2022 04:00:00 94 /min                   

[facility]



             Arterial blood by                                        [facility]

[facility]



             Pulse oximetry                                        [facility]

 

             Body temperature 2022 03:45:00 37.06 Tiffanie                 [facility]

ers[facility] of



                                                                 [facility]



                                                                 [facility]

 

             Body height  2022 03:45:00 149.9 cm                  [facility]

[facility]



                                                                 [facility]



                                                                 [facility]

 

             Body weight  2022 03:45:00 127.461 kg                [facility]

[facility]



                                                                 [facility]



                                                                 [facility]

 

             BMI          2022 03:45:00 56.76 kg/m2               [facility]

[facility]



                                                                 [facility]



                                                                 [facility]

 

             Systolic blood 2022 03:18:00 113 mm[Hg]                [facility]

[facility]ity of



             pressure                                            [facility]



                                                                 [facility]

 

             Diastolic blood 2022 03:18:00 85 mm[Hg]                 [facility]

[facility] of



             pressure                                            [facility]



                                                                 [facility]

 

             Heart rate   2022 03:18:00 96 /min                   [facility]

[facility]



                                                                 [facility]



                                                                 [facility]

 

             Respiratory rate 2022 03:18:00 18 /min                   [facility]

ers[facility] of



                                                                 [facility]



                                                                 [facility]

 

             Oxygen saturation in 2022 03:18:00 93 /min                   

[facility]



             Arterial blood by                                        [facility]

[facility]



             Pulse oximetry                                        [facility]

 

             Body temperature 2022 01:01:16 36.89 Tiffanie                 [facility]

ers[facility] of



                                                                 [facility]



                                                                 [facility]

 

             Body weight  2022 23:13:00 127.461 kg                [facility]

[facility]



                                                                 [facility]



                                                                 [facility]

 

             BMI          2022 23:13:00 56.76 kg/m2               [facility]

[facility]



                                                                 [facility]



                                                                 [facility]

 

             Systolic blood 2022 21:53:00 142 mm[Hg]                [facility]

[facility]ity of



             pressure                                            [facility]



                                                                 [facility]

 

             Diastolic blood 2022 21:53:00 88 mm[Hg]                 [facility]

rsity of



             pressure                                            [facility]



                                                                 [facility]

 

             Heart rate   2022 21:53:00 96 /min                   [facility]

[facility]



                                                                 [facility]



                                                                 [facility]

 

             Body temperature 2022 21:53:00 36.06 Tiffanie                 [facility]

[facility]



                                                                 [facility]



                                                                 [facility]

 

             Respiratory rate 2022 21:53:00 18 /min                   [facility]

ers[facility] of



                                                                 [facility]



                                                                 [facility]

 

             Oxygen saturation in 2022 21:53:00 96 /min                   

[facility]



             Arterial blood by                                        [facility]

[facility]



             Pulse oximetry                                        [facility]

 

             Body weight  2022 09:48:00 138.801 kg                [facility]

[facility]



                                                                 [facility]



                                                                 [facility]

 

             BMI          2022 09:48:00 61.80 kg/m2               [facility]

[facility]



                                                                 [facility]



                                                                 [facility]

 

             Body height  2022 10:27:00 149.9 cm                  [facility]

[facility]



                                                                 [facility]



                                                                 [facility]

 

             Systolic blood 2022 03:50:00 142 mm[Hg]                [facility]

[facility]ity of



             pressure                                            [facility]



                                                                 [facility]

 

             Diastolic blood 2022 03:50:00 95 mm[Hg]                 [facility]

rsity of



             pressure                                            [facility]



                                                                 [facility]

 

             Heart rate   2022 03:50:00 93 /min                   [facility]

[facility]



                                                                 [facility]



                                                                 [facility]

 

             Respiratory rate 2022 03:50:00 17 /min                   [facility]

[facility] of



                                                                 [facility]



                                                                 [facility]

 

             Oxygen saturation in 2022 03:50:00 98 /min                   

[facility]



             Arterial blood by                                        [facility]

[facility]



             Pulse oximetry                                        [facility]

 

             Body temperature 2022 20:39:00 36.5 Tiffanie                  [facility]

[facility] of



                                                                 [facility]



                                                                 [facility]

 

             Body weight  2022 20:39:00 136.079 kg                [facility]

[facility]



                                                                 [facility]



                                                                 [facility]

 

             BMI          2022 20:39:00 60.59 kg/m2               [facility]

ty of



                                                                 [facility]



                                                                 Branch

 

             Systolic blood 2022 01:46:00 134 mm[Hg]                [facility]iver

[facility]ity of



             pressure                                            [facility]



                                                                 Branch

 

             Diastolic blood 2022 01:46:00 100 mm[Hg]                Unive

rsity of



             pressure                                            [facility]



                                                                 Branch

 

             Heart rate   2022 01:46:00 102 /min                  [facility]

ty [facility]



                                                                 [facility]



                                                                 [facility]anch

 

             Respiratory rate 2022 01:46:00 22 /min                   Univ

[facility]



                                                                 [facility]



                                                                 Branch

 

             Oxygen saturation in 2022 01:46:00 96 /min                   

[facility]



             Arterial blood by                                        [facility]

[facility]



             Pulse oximetry                                        Branch

 

             Body temperature 2022-01-15 20:52:00 36.67 Tiffanie                 Univ

ersity of



                                                                 [facility]



                                                                 Branch

 

             Body weight  2022-01-15 20:52:00 136.079 kg                [facility]

ty of



                                                                 [facility]



                                                                 Branch

 

             BMI          2022-01-15 20:52:00 60.59 kg/m2               Univers[facility]

ty of



                                                                 [facility]



                                                                 [facility]anch

 

             Systolic blood 2021 23:30:00 175 mm[Hg]                Univer

sity of



             pressure                                            [facility]



                                                                 Branch

 

             Diastolic blood 2021 23:30:00 107 mm[Hg]                Unive

rsity of



             pressure                                            [facility]



                                                                 [facility]anch

 

             Heart rate   2021 23:30:00 81 /min                   Univers[facility]

ty of



                                                                 [facility]



                                                                 [facility]anch

 

             Respiratory rate 2021 23:30:00 21 /min                   Univ

ersity of



                                                                 [facility]



                                                                 [facility]anch

 

             Oxygen saturation in 2021 23:30:00 97 /min                   

[facility]



             Arterial blood by                                        [facility]

[facility]



             Pulse oximetry                                        Branch

 

             Body weight  2021 21:55:00 136.079 kg                Univers[facility]

ty of



                                                                 [facility]



                                                                 [facility]anch

 

             BMI          2021 21:55:00 60.59 kg/m2               Univers[facility]

ty of



                                                                 [facility]



                                                                 [facility]anch

 

             Body temperature 2021 21:55:00 37.44 Tiffanie                 Univ

ersity of



                                                                 [facility]



                                                                 [facility]anch

 

             Systolic blood 2021-05-10 01:30:00 163 mm[Hg]                Univer

sity of



             pressure                                            [facility]



                                                                 [facility]

 

             Diastolic blood 2021-05-10 01:30:00 106 mm[Hg]                Unive

rsity of



             pressure                                            [facility]



                                                                 [facility]anch

 

             Heart rate   2021-05-10 01:30:00 97 /min                   [facility]

ty of



                                                                 [facility]



                                                                 [facility]

 

             Respiratory rate 2021-05-10 01:30:00 21 /min                   Univ

ersity of



                                                                 [facility]



                                                                 [facility]anch

 

             Oxygen saturation in 2021-05-10 01:30:00 97 /min                   

[facility]



             Arterial blood by                                        [facility]

[facility]



             Pulse oximetry                                        Branch

 

             Body temperature 2021 23:27:00 36.83 Tiffanie                 Univ

ersity of



                                                                 [facility]



                                                                 [facility]anch

 

             Body height  2021 23:27:00 149.9 cm                  [facility]

ty of



                                                                 [facility]



                                                                 [facility]anch

 

             Body weight  2021 23:27:00 136.079 kg                [facility]

ty of



                                                                 [facility]



                                                                 [facility]anch

 

             BMI          2021 23:27:00 60.59 kg/m2               [facility]

ty of



                                                                 [facility]



                                                                 [facility]anch

 

             Systolic blood 2021-05-10 01:30:00 163 mm[Hg]                Univer

sity of



             pressure                                            [facility]



                                                                 [facility]

 

             Diastolic blood 2021-05-10 01:30:00 106 mm[Hg]                Unive

rsity of



             pressure                                            [facility]



                                                                 [facility]anch

 

             Heart rate   2021-05-10 01:30:00 97 /min                   [facility]

ty of



                                                                 [facility]



                                                                 [facility]

 

             Respiratory rate 2021-05-10 01:30:00 21 /min                   Univ

ersity of



                                                                 [facility]



                                                                 [facility]anch

 

             Oxygen saturation in 2021-05-10 01:30:00 97 /min                   

[facility]



             Arterial blood by                                        [facility]

[facility]



             Pulse oximetry                                        Branch

 

             Body temperature 2021 23:27:00 36.83 Tiffanie                 Univ

ersity of



                                                                 [facility]



                                                                 [facility]anch

 

             Body height  2021 23:27:00 149.9 cm                  [facility]

ty of



                                                                 [facility]



                                                                 [facility]anch

 

             Body weight  2021 23:27:00 136.079 kg                [facility]

ty of



                                                                 [facility]



                                                                 [facility]anch

 

             BMI          2021 23:27:00 60.59 kg/m2               Univers[facility]

ty of



                                                                 [facility]



                                                                 [facility]anch

 

             Systolic blood 2022 16:49:00 127 mm[Hg]                Univer

sity of



             pressure                                            [facility]



                                                                 [facility]anch

 

             Diastolic blood 2022 16:49:00 86 mm[Hg]                 Unive

rsity of



             pressure                                            [facility]



                                                                 [facility]anch

 

             Heart rate   2022 16:49:00 101 /min                  [facility]

ty of



                                                                 [facility]



                                                                 [facility]anch

 

             Body temperature 2022 16:49:00 36.83 Tiffanie                 Univ

ersity of



                                                                 [facility]



                                                                 [facility]anch

 

             Respiratory rate 2022 16:49:00 20 /min                   Univ

ersity of



                                                                 [facility]



                                                                 [facility]anch

 

             Oxygen saturation in 2022 16:49:00 95 /min                   

[facility]



             Arterial blood by                                        [facility]

[facility]



             Pulse oximetry                                        Branch

 

             Body height  2022 10:00:00 149.9 cm                  [facility]

 

             Body weight  2022 10:00:00 123 kg                    [facility]

 

             BMI          2022 10:00:00 54.77 kg/m2               [facility]

 

             Temperature Oral (F) 2022 21:12:00 98.0 F                    

[facility]

 

             Heart Rate   2022 21:12:00                           [facility]

 [facility]

 

             Respitory Rate 2022 21:12:00                           [facility]

al [place]

 

             Systolic (mm Hg) 2022 21:12:00                           [facility]

[facility]

 

             Diastolic (mm Hg) 2022 21:12:00                           [facility]

[facility]

 

             Heart Rate   2022 17:09:26                           [facility]

 [facility]

 

             Respitory Rate 2022 17:09:26                           [facility]

al [place]

 

             Temperature Oral (F) 2022 17:09:08 98.2 F                    

[facility] [place] [facility]

 

             Systolic (mm Hg) 2022 17:08:50                           [facility]

[facility] [place]

 

             Diastolic (mm Hg) 2022 17:08:50                           [facility]

[facility]

 

             Heart Rate   2022 17:08:50                           [facility]

 [place]

 

             Respitory Rate 2022 13:07:22                           [facility]

al [facility]

 

             Temperature Oral (F) 2022 13:07:04 97.7 F                    

[facility]

 

             Systolic (mm Hg) 2022 13:06:57                           [facility]

[facility]

 

             Diastolic (mm Hg) 2022 13:06:57                           [facility]

[facility] [place]

 

             Heart Rate   2022 10:00:21                           [facility]

 [place]

 

             Respitory Rate 2022 10:00:21                           [facility]

al [facility]

 

             Temperature Oral (F) 2022 09:59:28 97.5 F                    

[facility]

 

             Systolic (mm Hg) 2022 09:58:18                           [facility]

[facility] [place]

 

             Diastolic (mm Hg) 2022 09:58:18                           Mem

[facility] [place]

 

             Heart Rate   2022 09:58:18                           [facility]

 [place]

 

             Heart Rate   2022 04:58:41                           [facility]

 [facility]

 

             Respitory Rate 2022 04:58:41                           [facility]

al [place]

 

             Temperature Oral (F) 2022 04:58:34 97.2 F                    

[facility] [place] [facility]

 

             Systolic (mm Hg) 2022 04:57:48                           [facility]

[facility]

 

             Diastolic (mm Hg) 2022 04:57:48                           Mem

[facility] [place]

 

             Respitory Rate 2022 00:54:28                           Mem[facility]

al [place]

 

             Systolic (mm Hg) 2022 00:54:19                           [facility]

[facility]

 

             Diastolic (mm Hg) 2022 00:54:19                           Mem

[facility]

 

             Temperature Oral (F) 2022 00:53:24 98.1 F                    

[facility] [place] [facility]

 

             Height       2022 23:39:00 149.86 cm                 [facility]

 [place]

 

             Weight       2022 23:39:00                           [facility]

 [facility]

 

             BMI Calculated 2022 23:39:00                           [facility]

al [place]

 

             Systolic (mm Hg) 2022 14:00:00                           [facility]

[facility] [place]

 

             Diastolic (mm Hg) 2022 14:00:00                           Mem

[facility] [place]

 

             Respitory Rate 2022 14:00:00                           Mem[facility]

al [place]

 

             Respitory Rate 2022 13:00:00                           [facility]

al [place]

 

             Systolic (mm Hg) 2022 13:00:00                           [facility]

[facility] [place]

 

             Diastolic (mm Hg) 2022 13:00:00                           Mem

[facility] [place]

 

             Respitory Rate 2022 12:00:00                           Memori

al [place]

 

             Systolic (mm Hg) 2022 12:00:00                           [facility]

[facility]

 

             Diastolic (mm Hg) 2022 12:00:00                           Mem

[facility]

 

             Respitory Rate 2022 05:00:00                           [facility]

al [place]

 

             Systolic (mm Hg) 2022 05:00:00                           [facility]

[facility] [place]

 

             Diastolic (mm Hg) 2022 05:00:00                           Mem

[facility] [place]

 

             Respitory Rate 2022 04:00:00                           Mem[facility]

al [facility]

 

             Systolic (mm Hg) 2022 04:00:00                           [facility]

[facility] [place]

 

             Diastolic (mm Hg) 2022 04:00:00                           Mem

[facility]

 

             Respitory Rate 2022 03:00:00                           [facility]

[facility] [place]

 

             Systolic (mm Hg) 2022 03:00:00                           [facility]

[facility]

 

             Diastolic (mm Hg) 2022 03:00:00                           Mem

[facility]

 

             Heart Rate   2022 15:55:41                           [facility]

 [place]

 

             Temperature Oral (F) 2022 15:55:32 98.3 F                    

[facility]

 

             Heart Rate   2022 15:54:37                           [facility]

 [place]

 

             Heart Rate   2022 12:20:12                           [facility]

 [facility]

 

             Temperature Oral (F) 2022 12:19:51 97.7 F                    

[facility] [place] [facility]

 

             Temperature Oral (F) 2022 09:20:54 97.9 F                    

[facility]

 

             Height       2022 06:18:00 149.86 cm                 [facility]

 [place]

 

             Weight       2022 06:18:00                           [facility]

 [place]

 

             BMI Calculated 2022 06:18:00                           [facility]

[facility]

 

             Respitory Rate 2018 17:30:00                           [facility]

[facility] [place]

 

             Systolic (mm Hg) 2018 17:30:00                           [facility]

[facility] [place]

 

             Diastolic (mm Hg) 2018 17:30:00                           Mem

[facility]

 

             Temperature Oral (F) 2018 17:30:00 98.4 F                    

[facility]

 

             Temperature Oral (F) 2018 16:23:00 98.6 F                    

[facility] [place] [facility]

 

             Systolic (mm Hg) 2018 16:23:00                           [facility]

[facility]

 

             Diastolic (mm Hg) 2018 16:23:00                           Mem

[facility]

 

             Respitory Rate 2018 14:00:00                           [facility]

al [place]

 

             Systolic (mm Hg) 2018 14:00:00                           [facility]

[facility]

 

             Diastolic (mm Hg) 2018 14:00:00                           Mem

[facility]

 

             Respitory Rate 2018 13:30:00                           [facility]

al [place]

 

             BMI Calculated 2018 10:16:00                           [facility]

[facility] [place]

 

             Height       2018 10:16:00 149.86 cm                 [facility]

 [facility]

 

             Weight       2018 10:16:00                           [facility]

 [place]

 

             Heart Rate   2018 10:16:00                           [facility]

 [facility]

 

             Temperature Oral (F) 2018 10:16:00 97.9 F                    

[facility] [place]

 

             Temperature Oral (F) 2018 13:40:00 97.2 F                    

[facility] [place]

 

             Systolic (mm Hg) 2018 13:40:00                           [facility]

[facility]

 

             Diastolic (mm Hg) 2018 13:40:00                           Mem

[facility]

 

             Heart Rate   2018 13:40:00                           [facility]

 [place]

 

             Respitory Rate 2018 13:40:00                           [facility]

[facility]

 

             Heart Rate   2018 05:24:00                           [facility]

 [place]

 

             Systolic (mm Hg) 2018 05:24:00                           [facility]

[facility] [place]

 

             Diastolic (mm Hg) 2018 05:24:00                           Mem

[facility]

 

             Respitory Rate 2018 05:24:00                           [facility]

[facility] [place]

 

             Temperature Oral (F) 2018 05:24:00 98.1 F                    

[facility] [place]

 

             Respitory Rate 2018 01:15:00                           [facility]

[facility]

 

             Systolic (mm Hg) 2018 01:15:00                           [facility]

[facility]

 

             Diastolic (mm Hg) 2018 01:15:00                           Mem

[facility] [place]

 

             Heart Rate   2018 01:15:00                           [facility]

 [facility]

 

             Temperature Oral (F) 2018 01:15:00 98.1 F                    

[facility]

 

             Weight       2018 14:00:00                           [facility]

 [facility]

 

             Weight       2018 14:00:00                           [facility]

 [facility]

 

             Weight       2018 14:00:00                           [facility]

 [facility]

 

             BMI Calculated 2018 05:43:00                           [facility]

[facility] [place]

 

             Height       2018 05:43:00 162.56 cm                 [facility]

 [facility]

 

             Height       2018 22:41:00 149.86 cm                 [facility]

 [place]

 

             BMI Calculated 2018 22:41:00                           [facility]

[facility] [place]







Procedures







                Procedure       Date / Time     Performing Clinician Source



                                Performed                       

 

                POCT GLUCOSE (AUTOMATED) 2022 18:34:00 Scooby Sharpe

[facility]

 

                POCT GLUCOSE (AUTOMATED) 2022 14:37:00 Scooby Sharpe

[facility]

 

                BASIC METABOLIC PANEL (NA, 2022 13:22:00 TopeteJessi

[facility]



                K, CL, CO2, GLUCOSE, BUN,                                 Medica

l Branch



                CREATININE, CA)                                 

 

                CBC WITH DIFF   2022 13:22:00 Finn [facility]

 

                POCT GLUCOSE (AUTOMATED) 2022 10:56:00 Scooby Sharpe

[facility]

 

                POCT GLUCOSE (AUTOMATED) 2022 05:46:00 Scooby Sharpe

[facility]

 

                POCT GLUCOSE (AUTOMATED) 2022 02:17:00 Scooby Sharpe

[facility]

 

                POCT GLUCOSE (AUTOMATED) 2022 23:13:00 Scooby Sharpe

[facility]

 

                POCT GLUCOSE (AUTOMATED) 2022 18:22:00 Scooby Sharpe

[facility]

 

                POCT GLUCOSE (AUTOMATED) 2022 14:56:00 Scooby Sharpe

[facility]

 

                BASIC METABOLIC PANEL (NA, 2022 10:03:00 Matt Briseno

 [facility]



                K, CL, CO2, GLUCOSE, BUN,                                 Medica

l Branch



                CREATININE, CA)                                 

 

                CBC WITH DIFF   [facility]03[facility]

 

                POCT GLUCOSE (AUTOMATED) 2022 02:18:00 Scooby Sharpe

[facility]

 

                POCT GLUCOSE (AUTOMATED) 2022-08-10 22:26:00 Scooby Sharpe

[facility]

 

                POCT GLUCOSE (AUTOMATED) 2022-08-10 18:57:00 Scooby Sharpe

[facility]

 

                POCT GLUCOSE (AUTOMATED) 2022-08-10 18:57:00 Scooby Sharpe [facility]

[facility]

 

                US DUPLEX VENOUS ARMS 2022-08-10 17:28:45 Finn [facility]

[facility] [place]



                BILATERAL - BY VASCULAR                                 [facility] 

Branch



                LAB                                             

 

                US DUPLEX VENOUS ARMS 2022-08-10 17:28:45 Finn [facility]

[facility] [place]



                BILATERAL - BY VASCULAR                                 [facility] 

Branch



                LAB                                             

 

                POCT GLUCOSE (AUTOMATED) 2022-08-10 15:20:00 Scooby Sharpe

[facility]

 

                POCT GLUCOSE (AUTOMATED) 2022-08-10 15:20:00 Scooby Sharpe

[facility]

 

                MAGNESIUM       2022-08-10 10:19:00 [facility]

 

                BASIC METABOLIC PANEL (NA, 2022-08-10 10:19:00 Stas Edwardo 

[facility] 

[place]



                K, CL, CO2, GLUCOSE, BUN,                 Robin         [facility]

[facility] [place]



                CREATININE, CA)                                 

 

                CBC WITH DIFF   2022-08-10 10:19:00 [facility]

 

                MAGNESIUM       2022-08-10 10:19:00 [facility]

 

                BASIC METABOLIC PANEL (NA, 2022-08-10 10:19:00 Stas Edwardo 

[facility] 

[place]



                K, CL, CO2, GLUCOSE, BUN,                 Robin         Medica

[facility]



                CREATININE, CA)                                 

 

                CBC WITH DIFF   2022-08-10 10:19:00 [facility]

 

                POCT GLUCOSE (AUTOMATED) 2022-08-10 02:44:00 Scooby Sharpe U

[facility]

 

                POCT GLUCOSE (AUTOMATED) 2022-08-10 02:44:00 Scooby Sharpe

[facility]

 

                POCT GLUCOSE (AUTOMATED) 2022 22:02:00 Scooby Sharpe

[facility]

 

                POCT GLUCOSE (AUTOMATED) 2022 22:02:00 Scooby Sharpe

[facility] [facility]

 

                FUNGUS (ROUTINE) CULTURE 2022 17:03:00 Merry Bonds [facility]



                                                A               Medical Branch

 

                TISSUE          2022 17:03:00 Sapphire Bonds [facility]



                CULTURE(AEROBIC/ANAEROBIC)                 A               Medic

al Branch

 

                FUNGUS (ROUTINE) CULTURE 2022 17:03:00 Merry Bonds [facility]



                                                A               Medical Branch

 

                TISSUE          2022 17:03:00 Teo Bondsuwatosin [facility]



                CULTURE(AEROBIC/ANAEROBIC)                 A               Medic

al Branch

 

                FUNGUS (ROUTINE) CULTURE 2022 17:00:00 Merry Bonds [facility]



                                                A               Medical Branch

 

                TISSUE          2022 17:00:00 OgunTeo jamesSapphire [facility]



                CULTURE(AEROBIC/ANAEROBIC)                 A               Medic

al Branch

 

                FUNGUS (ROUTINE) CULTURE 2022 17:00:00 Merry Bnods [facility]



                                                A               Medical Branch

 

                TISSUE          2022 17:00:00 Teo Bondsuwatosin [facility]



                CULTURE(AEROBIC/ANAEROBIC)                 A               Medic

al Branch

 

                FUNGUS (ROUTINE) CULTURE 2022 16:59:00 Merry Bonds [facility]



                                                A               Medical Branch

 

                TISSUE          2022 16:59:00 Teo Bondsuwatosin [facility]



                CULTURE(AEROBIC/ANAEROBIC)                 A               Medic

al Branch

 

                FUNGUS (ROUTINE) CULTURE 2022 16:59:00 Merry Bonds [facility]



                                                A               Medical Branch

 

                TISSUE          2022 16:59:00 Teo Bondsuwatosin [facility]



                CULTURE(AEROBIC/ANAEROBIC)                 A               Medic

al Branch

 

                FOOT DEBRIDEMENT 2022 16:11:00 Teo Bondsuwatosin [facility]



                                                A               Medical Branch

 

                FOOT DEBRIDEMENT 2022 16:11:00 OgTeo persauduwatosin [facility]



                                                A               Medical Branch

 

                COVID-19 (ID NOW RAPID 2022 14:36:00 Matt Briseno [facility]

[facility] [place]



                TESTING)                                        Medical Branch

 

                LAB ONLY COVID  2022 14:36:00 Matt Briseno [facility]



                INTERPRETATION                                  Medical Branch

 

                COVID-19 (ID NOW RAPID 2022 14:36:00 Matt Briseno Uni

[facility] [place]



                TESTING)                                        Medical Branch

 

                LAB ONLY COVID  2022 14:36:00 Matt Briseno [facility]

 

                POCT GLUCOSE (AUTOMATED) 2022 14:07:00 An Chambers  Uni

[facility]

 

                POCT GLUCOSE (AUTOMATED) 2022 14:07:00 An Chambers  Uni

[facility]

 

                HB ECG ROUTINE & RHYTHM 2022 [facility]:27[facility]17 [facility]

 

                CBC WITH DIFF   2022 10:55:00 Stas, [facility]

 

                CBC WITH DIFF   2022 10:55:00 Stas, [facility]

 

                MAGNESIUM       2022 10:40:00 Stas [facility]

 

                BASIC METABOLIC PANEL (NA, 2022 10:40:00 Stas, Edwardo 

[facility] 

[place]



                K, CL, CO2, GLUCOSE, BUN,                 Robin         Medica

[facility]



                CREATININE, CA)                                 

 

                COVID-19 (ID NOW RAPID 2022 10:40:00 Valeriy Ankita    Unive

[facility] [place]



                TESTING)                                        Medical Branch

 

                LAB ONLY COVID  2022 10:40:00 [facility]

 

                MAGNESIUM       2022 10:40:00 Stas [facility]

 

                BASIC METABOLIC PANEL (NA, 2022 10:40:00 Stas Edwardo 

[facility] 

[place]



                K, CL, CO2, GLUCOSE, BUN,                 Merlin         Medica

[facility]



                CREATININE, CA)                                 

 

                COVID-19 (ID NOW RAPID 2022 10:40:00 [facility]



                TESTING)                                        Medical Branch

 

                LAB ONLY COVID  2022 10:40:00 [facility]

 

                POCT GLUCOSE (AUTOMATED) 2022 02:29:00 An Chambers  Uni

[facility]

 

                POCT GLUCOSE (AUTOMATED) 2022 02:29:00 An Chambers  Uni

[facility]

 

                POCT GLUCOSE (AUTOMATED) 2022 23:39:00 An Chambers  Uni

versity [facility]

 

                POCT GLUCOSE (AUTOMATED) 2022 23:39:00 An Chambers  Uni

versity of [facility]

 

                POCT GLUCOSE (AUTOMATED) 2022 17:10:00 An Chambers  Uni

versity of [facility]

 

                POCT GLUCOSE (AUTOMATED) 2022 17:10:00 An Chambers  Uni

versity of [facility]

 

                POCT GLUCOSE (AUTOMATED) 2022 17:10:00 An Chambers  Uni

versity of [facility]

 

                POCT GLUCOSE (AUTOMATED) 2022 15:54:00 An Chambers  Uni

versity of [facility]

 

                POCT GLUCOSE (AUTOMATED) 2022 15:54:00 An Chambers  Uni

versity of [facility]

 

                POCT GLUCOSE (AUTOMATED) 2022 15:54:00 An Chambers  [facility]

 

                SEDIMENTATION RATE 2022 08:36:00 Karina Whyte     [facility]

 

                BASIC METABOLIC PANEL (NA, 2022 08:36:00 Haines, Carolann U

niversity [facility]



                K, CL, CO2, GLUCOSE, BUN,                                 Medica

l Branch



                CREATININE, CA)                                 

 

                CBC WITH DIFF   2022 08:36:00 [facility]

 

                MAGNESIUM       2022 08:36:00 [facility]

 

                MAGNESIUM       2022 08:36:00 [facility]

 

                BASIC METABOLIC PANEL (NA, 2022 08:36:00 [facility]



                K, CL, CO2, GLUCOSE, BUN,                                 Medica

l Branch



                CREATININE, CA)                                 

 

                SEDIMENTATION RATE 2022 08:36:00 Karina Whyte     [facility]

 

                CBC WITH DIFF   2022 08:36:00 Carolann Haines [facility]

 

                MAGNESIUM       2022 08:36:00 [facility]

 

                BASIC METABOLIC PANEL (NA, 2022 08:36:00 Carolann Haines

[facility] 

[place]



                K, CL, CO2, GLUCOSE, BUN,                                 Medica

l Branch



                CREATININE, CA)                                 

 

                SEDIMENTATION RATE 2022 08:36:00 Karina Whyte     [facility]

 

                CBC WITH DIFF   2022 08:36:00 Carolann Haines o

f [facility]

 

                POCT GLUCOSE (AUTOMATED) 2022 00:57:00 Abdirahman Lawy B Un

iversity of 

[facility]

 

                POCT GLUCOSE (AUTOMATED) 2022 00:57:00 Thanh Yo B Un

iversity of 

[facility]

 

                POCT GLUCOSE (AUTOMATED) 2022 00:57:00 Thanh Yo B Un

iversity of 

[facility]

 

                POCT GLUCOSE (AUTOMATED) 2022 21:26:00 [place] Yo B Un

iversity of 

[facility]

 

                POCT GLUCOSE (AUTOMATED) 2022 21:26:00 Thanh Yo B Un

iversity of 

[facility]

 

                POCT GLUCOSE (AUTOMATED) 2022 21:26:00 Thanh Yo B Un

iversity of 

[facility]

 

                POCT GLUCOSE (AUTOMATED) 2022 16:47:00 [place] Yo B Un

iversity of 

[facility]

 

                POCT GLUCOSE (AUTOMATED) 2022 16:47:00 FalmouthAbdirahmany B Un

iversity of 

[facility]

 

                POCT GLUCOSE (AUTOMATED) 2022 16:47:00 [place], Yo B Un

iversity of 

[facility]

 

                BASIC METABOLIC PANEL (NA, 2022 14:21:00 Carolann Haines

[facility] 

[place]



                K, CL, CO2, GLUCOSE, BUN,                                 Medica

l Branch



                CREATININE, CA)                                 

 

                C-REACTIVE PROTEIN 2022 14:21:00 Isabell [facility]

 

                C-REACTIVE PROTEIN 2022 14:21:00 Isabell [facility]

 

                BASIC METABOLIC PANEL (NA, 2022 [facility]:21:00 [facility]



                K, CL, CO2, GLUCOSE, BUN,                                 Medica

l Branch



                CREATININE, CA)                                 

 

                C-REACTIVE PROTEIN 2022 14:21:00 Karina Whyte [facility]

 

                BASIC METABOLIC PANEL (NA, 2022 14:21:00 Haines, [facility]ann [facility]

[facility]



                K, CL, CO2, GLUCOSE, BUN,                                 Medica

l Branch



                CREATININE, CA)                                 

 

                POCT GLUCOSE (AUTOMATED) 2022 12:34:00 Yo Law B Un

iversity of 

[facility]

 

                POCT GLUCOSE (AUTOMATED) 2022 12:34:00 ThanhAbdirahmany B Un

iversity of 

[facility]

 

                POCT GLUCOSE (AUTOMATED) 2022 12:34:00 Abdirahman Lawy B Un

iversity of 

[facility]

 

                POCT GLUCOSE (AUTOMATED) 2022 01:28:00 Yo Law B Un

[facility] 

[place]



                                                                Medical Branch

 

                POCT GLUCOSE (AUTOMATED) 2022 01:28:00 Abdirahman Lawy B Un

[facility] 

[place]



                                                                Medical Branch

 

                POCT GLUCOSE (AUTOMATED) 2022 01:28:00 FalmouthAbdirahmany B Un

[facility] 

[place]



                                                                Medical Branch

 

                POCT GLUCOSE (AUTOMATED) 2022 21:13:00 Thanh Yo B Un

[facility] 

[place]



                                                                Medical Branch

 

                POCT GLUCOSE (AUTOMATED) 2022 21:13:00 FalmouthAbdirahmany B Un

[facility] 

[place]



                                                                Medical Branch

 

                POCT GLUCOSE (AUTOMATED) 2022 21:13:00 [place] Yo B Un

[facility] 

[place]



                                                                Medical Branch

 

                POCT GLUCOSE (AUTOMATED) 2022 16:39:00 Thanh Yo B Un

[facility] 

[place]



                                                                Medical Branch

 

                POCT GLUCOSE (AUTOMATED) 2022 16:39:00 [place] Oy B Un

iversity of 

[facility]

 

                POCT GLUCOSE (AUTOMATED) 2022 16:39:00 [place] Yo B Un

iversity of 

[facility]

 

                POCT GLUCOSE (AUTOMATED) 2022 13:11:00 Yo Law Un

iversity of 

[facility]

 

                POCT GLUCOSE (AUTOMATED) 2022 13:11:00 Yo Law Un

iversity of 

[facility]

 

                POCT GLUCOSE (AUTOMATED) 2022 13:11:00 Yo Law Un

iversity of 

[facility]

 

                MAGNESIUM       2022 10:28:00 [facility]

 

                BASIC METABOLIC PANEL (NA, 2022 10:28:00 Butt, [facility]



                K, CL, CO2, GLUCOSE, BUN,                                 Medica

l Branch



                CREATININE, CA)                                 

 

                MAGNESIUM       2022 10:28:00 Butt, [facility]

 

                BASIC METABOLIC PANEL (NA, 2022 10:28:00 Butt, [facility]



                K, CL, CO2, GLUCOSE, BUN,                                 Medica

l Branch



                CREATININE, CA)                                 

 

                MAGNESIUM       2022 10:28:00 Butt, [facility]

 

                BASIC METABOLIC PANEL (NA, 2022 10:28:00 Butt, [facility]



                K, CL, CO2, GLUCOSE, BUN,                                 Medica

l Branch



                CREATININE, CA)                                 

 

                POCT GLUCOSE (AUTOMATED) 2022 10:01:00 Yo Law Un

iversity of 

[facility]

 

                POCT GLUCOSE (AUTOMATED) 2022 10:01:00 Yo Law Un

iversity of 

[facility]

 

                POCT GLUCOSE (AUTOMATED) 2022 10:01:00 Yo Law Un

iversity of 

[facility]

 

                POCT GLUCOSE (AUTOMATED) 2022 06:32:00 Yo Law Un

iversity of 

[facility]

 

                POCT GLUCOSE (AUTOMATED) 2022 06:32:00 Yo Law Un

iversity of 

[facility]

 

                POCT GLUCOSE (AUTOMATED) 2022 06:32:00 Yo Law Un

iversity of 

[facility]

 

                BASIC METABOLIC PANEL (NA, 2022 02:01:00 Butt, [facility]



                K, CL, CO2, GLUCOSE, BUN,                                 Medica

l Branch



                CREATININE, CA)                                 

 

                BASIC METABOLIC PANEL (NA, 2022 02:01:00 Shaq [facility]



                K, CL, CO2, GLUCOSE, BUN,                                 Medica

l Branch



                CREATININE, CA)                                 

 

                BASIC METABOLIC PANEL (NA, 2022 02:01:00 Shaq [facility]



                K, CL, CO2, GLUCOSE, BUN,                                 Medica

l Branch



                CREATININE, CA)                                 

 

                POCT GLUCOSE (AUTOMATED) 2022 01:53:00 Yo Law Un

[facility] 

[place]



                                                                Medical Branch

 

                POCT GLUCOSE (AUTOMATED) 2022 01:53:00 Yo Law Un

[facility] 

[place]



                                                                Medical Branch

 

                POCT GLUCOSE (AUTOMATED) 2022 01:53:00 Yo Law B Un

[facility] of 

[place]



                                                                Medical Branch

 

                POCT GLUCOSE (AUTOMATED) 2022 23:02:00 Yo Law Un

[facility] of 

[place]



                                                                Medical Branch

 

                POCT GLUCOSE (AUTOMATED) 2022 23:02:00 Yo Law Un

[facility] 

[place]



                                                                Medical Branch

 

                POCT GLUCOSE (AUTOMATED) 2022 23:02:00 Yo Law Un

[facility] 

[place]



                                                                Medical Branch

 

                XR FOOT 3+ VW BILATERAL 2022 21:35:00 [facility]



                                                A               Medical Branch

 

                XR FOOT 3+ VW BILATERAL 2022 21:35:00 [facility]



                                                A               Medical Branch

 

                XR FOOT 3+ VW BILATERAL 2022 21:35:00 [facility]



                                                A               [facility]

 

                POCT GLUCOSE (AUTOMATED) 2022 20:45:00 Yo Law B Un

[facility] 

[place]



                                                                Medical Branch

 

                POCT GLUCOSE (AUTOMATED) 2022 20:45:00 Yo Law B Un

iversity of 

[facility]

 

                POCT GLUCOSE (AUTOMATED) 2022 20:45:00 Yo Law B Un

[facility] of 

[place]



                                                                Medical Branch

 

                POCT GLUCOSE (AUTOMATED) 2022 16:41:00 Yo Law Un

iversity of 

[facility]

 

                POCT GLUCOSE (AUTOMATED) 2022 16:41:00 Yo Law Un

iversity of 

[facility]

 

                POCT GLUCOSE (AUTOMATED) 2022 16:41:00 Yo Law Un

iversity of 

[facility]

 

                BASIC METABOLIC PANEL (NA, 2022 15:35:00 Yo Law 

[facility]



                K, CL, CO2, GLUCOSE, BUN,                                 Medica

l Branch



                CREATININE, CA)                                 

 

                BASIC METABOLIC PANEL (NA, 2022 15:35:00 Yo Law 

[facility]



                K, CL, CO2, GLUCOSE, BUN,                                 Medica

l Branch



                CREATININE, CA)                                 

 

                BASIC METABOLIC PANEL (NA, 2022 15:35:00 Yo Law 

[facility]



                K, CL, CO2, GLUCOSE, BUN,                                 Medica

l Branch



                CREATININE, CA)                                 

 

                BETA HYDROXY-BUTYRATE 2022 15:34:00 Hi Collazo     [facility]

 

                BETA HYDROXY-BUTYRATE 2022 15:34:00 [facility]

 

                BETA HYDROXY-BUTYRATE 2022 15:34:00 [facility]

 

                POCT GLUCOSE (AUTOMATED) 2022 14:20:00 Yo Law Un

iversity of 

[facility]

 

                POCT GLUCOSE (AUTOMATED) 2022 14:20:00 Yo Law Un

iversity of 

[facility]

 

                POCT GLUCOSE (AUTOMATED) 2022 14:20:00 Yo Law Un

iversity of 

[facility]

 

                POCT GLUCOSE (AUTOMATED) 2022 12:52:00 Yo Law Un

iversity of 

[facility]

 

                POCT GLUCOSE (AUTOMATED) 2022 12:52:00 Yo Law Un

iversity of 

[facility]

 

                POCT GLUCOSE (AUTOMATED) 2022 12:52:00 Yo Law Un

iversity of 

[facility]

 

                POCT GLUCOSE (AUTOMATED) 2022 09:04:00 Yo Law Un

iversity of 

[facility]

 

                POCT GLUCOSE (AUTOMATED) 2022 09:04:00 Yo Law Un

iversity [facility]

 

                POCT GLUCOSE (AUTOMATED) 2022 09:04:00 Yo Law Un

iversity [facility]

 

                BASIC METABOLIC PANEL (NA, 2022 06:13:00 Skylar Tinajero  [facility]

[facility] 

[place]



                K, CL, CO2, GLUCOSE, BUN,                                 Medica

l Branch



                CREATININE, CA)                                 

 

                GLUTAMIC ACID   2022 06:13:00 [facility]



                DECARBOXYLASE [facility]

 

                CBC WITHOUT DIFF 2022 06:13:00 [facility]

 

                MAGNESIUM       2022 06[facility]13:00 [facility]

 

                MAGNESIUM       2022 06:13:00 Skylar Tinajero  [facility]

 

                BASIC METABOLIC PANEL (NA, 2022 06:13:00 Skylar Tinajero

[facility] 

[place]



                K, CL, CO2, GLUCOSE, BUN,                                 Medica

l Branch



                CREATININE, CA)                                 

 

                CBC WITHOUT DIFF 2022 06:13:00 [facility]

 

                GLUTAMIC ACID   2022 06:13:00 Skylar Tinajero  [facility]



                DECARBOXYLASE [facility]

 

                MAGNESIUM       2022 06:13:00 Skylar Tinajero  [facility]

 

                BASIC METABOLIC PANEL (NA, 2022 06:13:00 Skylar Tinajero

[facility] 

[place]



                K, CL, CO2, GLUCOSE, BUN,                                 Medica

l Branch



                CREATININE, CA)                                 

 

                CBC WITHOUT DIFF 2022 06:13:00 [facility]

 

                GLUTAMIC ACID   2022 06:13:00 Skylar Tinajero  [facility]



                DECARBOXYLASE AB                                 [facility]

 

                POCT GLUCOSE (AUTOMATED) 2022 05:45:00 Yo Law Un

iversity [facility]

 

                POCT GLUCOSE (AUTOMATED) 2022 05:45:00 Yo Law Un

iversity of 

[facility]

 

                POCT GLUCOSE (AUTOMATED) 2022 05:45:00 Yo Law Un

iversity of 

[facility]

 

                POCT GLUCOSE (AUTOMATED) 2022 01:26:00 Yo Law Un

iversity of 

[facility]

 

                POCT GLUCOSE (AUTOMATED) 2022 01:26:00 Yo Law Un

iversity of 

[facility]

 

                POCT GLUCOSE (AUTOMATED) 2022 01:26:00 Yo Law Un

iversity of 

[facility]

 

                POCT GLUCOSE (AUTOMATED) 2022 23:42:00 Yo Law Un

iversity of 

[facility]

 

                POCT GLUCOSE (AUTOMATED) 2022 23:42:00 Yo Law Un

iversity of 

[facility]

 

                POCT GLUCOSE (AUTOMATED) 2022 23:42:00 Yo aLw Un

iversity of 

[facility]

 

                BASIC METABOLIC PANEL (NA, 2022 22:51:00 Yo Law 

[facility]



                K, CL, CO2, GLUCOSE, BUN,                                 Medica

l Branch



                CREATININE, CA)                                 

 

                BASIC METABOLIC PANEL (NA, 2022 22:51:00 Yo Law 

[facility]



                K, CL, CO2, GLUCOSE, BUN,                                 Medica

l Branch



                CREATININE, CA)                                 

 

                BASIC METABOLIC PANEL (NA, 2022 22:51:00 Yo Law 

[facility]



                K, CL, CO2, GLUCOSE, BUN,                                 Medica

l Branch



                CREATININE, CA)                                 

 

                POCT GLUCOSE (AUTOMATED) 2022 22:37:00 Yo Law Un

iversity of 

[facility]

 

                POCT GLUCOSE (AUTOMATED) 2022 22:37:00 Yo Law Un

iversity of 

[facility]

 

                POCT GLUCOSE (AUTOMATED) 2022 22:37:00 Yo Law Un

iversity of 

[facility]

 

                POCT GLUCOSE (AUTOMATED) 2022 21:30:00 Yo Law

iversity of 

[facility]

 

                POCT GLUCOSE (AUTOMATED) 2022 21:30:00 Yo Law Un

iversity of 

[facility]

 

                POCT GLUCOSE (AUTOMATED) 2022 21:30:00 Yo Law Un

iversity of 

[facility]

 

                POCT GLUCOSE (AUTOMATED) 2022 20:05:00 Yo Law Un

iversity of 

[facility]

 

                POCT GLUCOSE (AUTOMATED) 2022 20:05:00 Yo Law Un

iversity of 

[facility]

 

                POCT GLUCOSE (AUTOMATED) 2022 20:05:00 Yo Law Un

iversity of 

[facility]

 

                HB ECG ROUTINE & RHYTHM 2022 19:59:13 Chana New Uni

[facility]

 

                HB ECG ROUTINE & RHYTHM 2022 19:59:13 Chana [place] Uni

[facility]

 

                HB ECG ROUTINE & RHYTHM 2022 19:59:13 Chana New Uni

[facility]

 

                POCT GLUCOSE (AUTOMATED) 2022 19:06:00 Yo Law Un

iversity of 

[facility]

 

                POCT GLUCOSE (AUTOMATED) 2022 19:06:00 Yo Law Un

iversity of 

[facility]

 

                POCT GLUCOSE (AUTOMATED) 2022 19:06:00 Yo Law Un

iversity of 

[facility]

 

                BLOOD CULTURE SCREEN 2022 18:49:00 Carolann Haines [facility]

 

                BLOOD CULTURE SCREEN 2022 18:49:00 [facility]

 

                BLOOD CULTURE SCREEN 2022 18:49:00 [facility]

 

                BLOOD CULTURE SCREEN 2022 18:48:00 Carolann Haines [facility]

ity [facility]

 

                BLOOD CULTURE SCREEN 2022 18:48:00 Carolann Haines [facility]

ity [facility]

 

                BLOOD CULTURE SCREEN 2022 18:48:00 [facility]

 

                XR CHEST 1 VW   2022 18:34:00 Shaq Sheylarubén Givens [facility]

 

                XR CHEST 1 VW   2022 18:34:00 Butt rubén Chon [facility]

 

                XR CHEST 1 VW   2022 18:34:00 Butt, [facility]

 

                BASIC METABOLIC PANEL (NA, 2022 17:49:00 Yo Law 

[facility]



                K, CL, CO2, GLUCOSE, BUN,                                 Medica

l Branch



                CREATININE, CA)                                 

 

                TROPONIN I      2022 17:49:00 Vale [facility]

 

                TROPONIN I      2022 17:49:00 Vale, [facility]

 

                BASIC METABOLIC PANEL (NA, 2022 17:49:00 Yo Law 

[facility]



                K, CL, CO2, GLUCOSE, BUN,                                 Medica

l Branch



                CREATININE, CA)                                 

 

                TROPONIN I      2022 17:49:00 Vale [facility]

 

                BASIC METABOLIC PANEL (NA, 2022 17:49:00 Yo Law 

[facility]



                K, CL, CO2, GLUCOSE, BUN,                                 Medica

l Branch



                CREATININE, CA)                                 

 

                POCT GLUCOSE (AUTOMATED) 2022 17:27:00 Yo Law Un

iversity [facility]

 

                POCT GLUCOSE (AUTOMATED) 2022 17:27:00 Yo Law Un

iversity [facility]

 

                POCT GLUCOSE (AUTOMATED) 2022 17:27:00 Abdirahman Lawy B Un

iversity [facility]

 

                POCT GLUCOSE (AUTOMATED) 2022 15:53:00 Yo Law B Un

iversity [facility]

 

                POCT GLUCOSE (AUTOMATED) 2022 15:53:00 Yo Law B Un

iversity [facility]

 

                POCT GLUCOSE (AUTOMATED) 2022 15:53:00 Yo Law B Un

iversity [facility]

 

                BASIC METABOLIC PANEL (NA, 2022 15:32:00 Yo Law 

[facility]



                K, CL, CO2, GLUCOSE, BUN,                                 Medica

l Branch



                CREATININE, CA)                                 

 

                BASIC METABOLIC PANEL (NA, 2022 15:32:00 Yo Law 

[facility]



                K, CL, CO2, GLUCOSE, BUN,                                 Medica

l Branch



                CREATININE, CA)                                 

 

                BASIC METABOLIC PANEL (NA, 2022 15:32:00 Yo Law 

[facility]



                K, CL, CO2, GLUCOSE, BUN,                                 Medica

l Branch



                CREATININE, CA)                                 

 

                POCT GLUCOSE (AUTOMATED) 2022 14:56:00 Yo Law B Un

iversity of 

[facility]

 

                POCT GLUCOSE (AUTOMATED) 2022 14:56:00 Yo Law Un

iversity of 

[facility]

 

                POCT GLUCOSE (AUTOMATED) 2022 14:56:00 Yo Law Un

iversity of 

[facility]

 

                POCT GLUCOSE (AUTOMATED) 2022 13:48:00 Yo Law Un

iversity of 

[facility]

 

                POCT GLUCOSE (AUTOMATED) 2022 13:48:00 Yo Law B Un

iversity of 

[facility]

 

                POCT GLUCOSE (AUTOMATED) 2022 13:48:00 Yo Law B Un

iversity of 

[facility]

 

                POCT GLUCOSE (AUTOMATED) 2022 10:42:00 Yo Law Un

iversity of 

[facility]

 

                POCT GLUCOSE (AUTOMATED) 2022 10:42:00 Yo Law B Un

iversity of 

[facility]

 

                POCT GLUCOSE (AUTOMATED) 2022 10:42:00 Yo Law Un

iversity of 

[facility]

 

                BASIC METABOLIC PANEL (NA, 2022 10:14:00 [facility]



                K, CL, CO2, GLUCOSE, BUN,                                 Medica

l Branch



                CREATININE, CA)                                 

 

                MAGNESIUM       2022 10:14:00 Carolann Haines o

f [facility]

 

                MAGNESIUM       2022 10:14:00 Zaki [facility]

 

                BASIC METABOLIC PANEL (NA, 2022 10:14:00 Zaki [facility]



                K, CL, CO2, GLUCOSE, BUN,                                 Medica

l Branch



                CREATININE, CA)                                 

 

                MAGNESIUM       2022 10:14:00 Haines, [facility]

 

                BASIC METABOLIC PANEL (NA, 2022 10:14:00 Haines, [facility]



                K, CL, CO2, GLUCOSE, BUN,                                 Medica

l Branch



                CREATININE, CA)                                 

 

                POCT GLUCOSE (AUTOMATED) 2022 09:30:00 Yo Law Un

iversity [facility]

 

                POCT GLUCOSE (AUTOMATED) 2022 09:30:00 Yo Law Un

iversity [facility]

 

                POCT GLUCOSE (AUTOMATED) 2022 09:30:00 Yo Law Un

iversity [facility]

 

                POCT GLUCOSE (AUTOMATED) 2022 07:11:00 Yo Law Un

iversity [facility]

 

                POCT GLUCOSE (AUTOMATED) 2022 07:11:00 Yo Law Un

iversity [facility]

 

                POCT GLUCOSE (AUTOMATED) 2022 07:11:00 Yo Law Un

iversity [facility]

 

                OSMOLALITY, SERUM OR 2022 07:07:00 Yo Law

sity [facility]

 

                BETA HYDROXY-BUTYRATE 2022 07:07:00 Yo Law

[facility]

 

                OSMOLALITY, SERUM OR 2022 07:07:00 Yo Law

sity [facility]

 

                BETA HYDROXY-BUTYRATE 2022 07:07:00 Yo Law

[facility]

 

                OSMOLALITY, SERUM OR 2022 07:07:00 Yo Law

sity [facility]

 

                BETA HYDROXY-BUTYRATE 2022 07:07:00 Yo Law

[facility]

 

                BASIC METABOLIC PANEL (NA, 2022 07:06:00 Yo Law 

[facility]



                K, CL, CO2, GLUCOSE, BUN,                                 Medica

l Branch



                CREATININE, CA)                                 

 

                BASIC METABOLIC PANEL (NA, 2022 07:06:00 Yo Law 

[facility]



                K, CL, CO2, GLUCOSE, BUN,                                 Medica

l Branch



                CREATININE, CA)                                 

 

                BASIC METABOLIC PANEL (NA, 2022 07:06:00 Yo Law 

[facility]



                K, CL, CO2, GLUCOSE, BUN,                                 Medica

l Branch



                CREATININE, CA)                                 

 

                CT ABDOMEN PELVIS W 2022 05:28:46 Yo Law [facility]



                CONTRAST                                        [facility]

 

                CT ABDOMEN PELVIS W 2022 05:28:46 Yo Law [facility]



                CONTRAST                                        [facility]

 

                CT ABDOMEN PELVIS W 2022 05:28:46 Yo Law [facility]



                CONTRAST                                        [facility]

 

                POCT GLUCOSE(AGE >30DAYS) 2022 05:11:00 Yo Law [facility]

 

                POCT GLUCOSE(AGE >30DAYS) 2022 05:11:00 Yo Law U

[facility]

 

                POCT GLUCOSE(AGE >30DAYS) 2022 05:11:00 Yo Law U

[facility]

 

                POCT GLUCOSE (AUTOMATED) 2022 05:08:00 Yo Law Un

[facility]

 

                POCT GLUCOSE (AUTOMATED) 2022 05:08:00 Yo Law Un

[facility]

 

                POCT GLUCOSE (AUTOMATED) 2022 05:08:00 Yo Law Un

[facility]

 

                BLOOD CULTURE SCREEN 2022 04:31:00 Yo Law [facility]

 

                BLOOD CULTURE WORKUP 2022 04:31:00 Yo Law [facility]

 

                GRAM POSITIVE BLOOD 2022 04:31:00 Yo Law [facility]



                PATHOGENS DNA                                   [facility]



                PROBE-AEROBIC                                   

 

                BLOOD CULTURE SCREEN 2022 04:31:00 Yo Law Univer

[facility]

 

                BLOOD CULTURE WORKUP 2022 04:31:00 ThanhYo mills [facility]

 

                GRAM POSITIVE BLOOD 2022 04:31:00 Yo Law [facility]



                PATHOGENS DNA                                   [facility]



                PROBE-AEROBIC                                   

 

                BLOOD CULTURE SCREEN 2022 04:31:00 Yo Law [facility]

 

                BLOOD CULTURE WORKUP 2022 04:31:00 Yo Law Univer

[facility]

 

                GRAM POSITIVE BLOOD 2022 04:31:00 Yo Law [facility]



                PATHOGENS [facility]



                PROBE-AEROBIC                                   

 

                URINALYSIS      2022 04:21:00 Yo Law [facility]

 

                URINE CULTURE   2022 04:21:00 [place] mills [facility]

 

                URINALYSIS      2022 04:21:00 ThanhYo mills [facility]

 

                URINE CULTURE   2022 04:21:00 Yo Law [facility]

 

                URINALYSIS      2022 04:21:00 Yo Law B [facility]

 

                URINE CULTURE   2022 04:21:00 Yo Law [facility]

 

                COVID-19 (ID NOW RAPID 2022 04:20:00 ThanhYo mills B Univ

[facility] [place]



                TESTING)                                        [facility] Branch

 

                LAB ONLY COVID  2022 04:20:00 Yo Law [facility]



                INTERPRETATION                                  [facility]

 

                COVID-19 (ID NOW RAPID 2022 04:20:00 [place], Yo B Univ

[facility] [place]



                TESTING)                                        Medical Branch

 

                LAB ONLY COVID  2022 04:20:00 Yo Law [facility]

 

                COVID-19 (ID NOW RAPID 2022 04:20:00 Yo Law Univ

[facility] [place]



                TESTING)                                        Medical Branch

 

                LAB ONLY COVID  2022 04:20:00 Yo Law [facility]



                INTERPRETATION                                  [facility]

 

                CBC WITH DIFF   2022 04:18:00 Yo Law [facility]

 

                PROTHROMBIN TIME / INR 2022 04:18:00 Thanh Yo B [facility]

 

                BASIC METABOLIC PANEL (NA, 2022 04:18:00 Yo Law 

[facility]



                K, CL, CO2, GLUCOSE, BUN,                                 Medica

l Branch



                CREATININE, CA)                                 

 

                HEPATIC FUNCTION PANEL 2022 04:18:00 Thanh Yo B Univ

[facility] [place]



                ([id number]) (ALB,T.PRO,BILI                                 [facility] 

Branch



                T,BU/BC,ALT,AST,ALK PHOS)                                 

 

                LIPASE          2022 04:18:00 Yo Law [facility]

 

                ACUTE CARE VENOUS BLOOD 2022 04:18:00 Yo Law Uni

[facility] [place]



                GAS                                             [facility]

 

                LACTIC ACID WHOLE BLOOD 2022 04:18:00 Yo Law [facility]

 

                MAGNESIUM       2022 04:18:00 Yo Law [facility]

 

                PHOSPHORUS      2022 04:18:00 Yo Law [facility]

 

                GLYCOSYLATED HEMOGLOBIN 2022 04:18:00 Yo Law Uni

[facility] [place]



                (A1C)                                           [facility]

 

                PHOSPHORUS      2022 04:18:00 Yo Law [facility]

 

                LIPASE          2022 04:18:00 Yo Law [facility]

 

                MAGNESIUM       2022 04:18:00 Yo Law [facility]

 

                HEPATIC FUNCTION PANEL 2022 04:18:00 Thanh Yo B Univ

[facility] [place]



                ([id number]) (ALB,T.PRO,BILI                                 [facility] 

Branch



                T,BU/BC,ALT,AST,ALK PHOS)                                 

 

                BASIC METABOLIC PANEL (NA, 2022 04:18:00 ThanhYo B 

[facility]



                K, CL, CO2, GLUCOSE, BUN,                                 Medica

l Branch



                CREATININE, CA)                                 

 

                ACUTE CARE VENOUS BLOOD 2022 04:18:00 [place] B Uni

General acute hospital

 

                CBC WITH DIFF   2022 04:18:00 ThanhYo mills [facility]

 

                GLYCOSYLATED HEMOGLOBIN 2022 04:18:00 Yo Law B Uni

[facility] [place]



                ([facility])                                           [facility]

 

                PROTHROMBIN TIME / INR 2022 04:18:00 [place], Cody B [facility]

 

                LACTIC ACID WHOLE BLOOD 2022 04:18:00 [place]Yo reyes Uni

[facility]

 

                PHOSPHORUS      2022 04:18:00 ThanhYo reyes [facility]

 

                LIPASE          2022 04:18:00 Yo Law [facility]

 

                MAGNESIUM       2022 04:18:00 [place] B [facility]

 

                HEPATIC FUNCTION PANEL 2022 04:18:00 Thanh Yo B Univ

[facility] [place]



                ([id number]) (ALB,T.PRO,[facility] 

Branch



                T,BU/BC,ALT,AST,ALK PHOS)                                 

 

                BASIC METABOLIC PANEL (NA, 2022 04:18:00 Yo Law 

[facility]



                K, CL, CO2, GLUCOSE, BUN,                                 Medica

l Branch



                CREATININE, CA)                                 

 

                ACUTE CARE VENOUS BLOOD 2022 04:18:00 [place] mills B Uni

General acute hospital

 

                CBC WITH DIFF   2022 04:18:00 Yo Law B [facility]

 

                GLYCOSYLATED HEMOGLOBIN 2022 04:18:00 Yo Law B Uni

[facility] [place]



                (A1C)                                           [facility]

 

                PROTHROMBIN TIME / INR 2022 04:18:00 [place], Cody B [facility]

 

                LACTIC ACID WHOLE BLOOD 2022 04:18:00 Yo Law B Uni

[facility]

 

                EMERGENCY DEPARTMENT 2022 05:01:00 Doctor Unassigned, [facility]



                DOCUMENTS                       [place]         [facility]

 

                HOSPITAL ADMISSION 2022 05:01:00 Doctor Unassigned, [facility]



                                                [place]         [facility]

 

                EMERGENCY DEPARTMENT 2022 05:01:00 Doctor Unassigned, [facility]



                DOCUMENTS                       [place]         [facility]

 

                HOSPITAL ADMISSION 2022 05:01:00 Doctor Unassigned, [facility]



                                                [place]         [facility]

 

                EMERGENCY DEPARTMENT 2022 05:01:00 Doctor Unassigned, [facility]



                DOCUMENTS                       [place]         [facility]

 

                HOSPITAL ADMISSION 2022 05:01:00 Doctor Unassigned, [facility]



                                                [place]         [facility]

 

                POCT GLUCOSE (AUTOMATED) 2022 19:34:00 EdSwapna blacky  Uni

[facility]

 

                POCT GLUCOSE (AUTOMATED) 2022 19:34:00 EdionRachel llanes  Uni

[facility]

 

                POCT GLUCOSE (AUTOMATED) 2022 16:39:00 Edionwe, [facility]  Uni

vers[facility]

 

                POCT GLUCOSE (AUTOMATED) 2022 16:39:00 EdionRachel llanes  Uni

[facility]

 

                BASIC METABOLIC PANEL (NA, 2022 09:13:00 [place], Kaylynn  U

[facility] 

[place]



                K, CL, CO2, GLUCOSE, BUN,                                 Medica

l Branch



                CREATININE, CA)                                 

 

                BASIC METABOLIC PANEL (NA, 2022 09:13:00 [place], Kaylynn  U

[facility] 

[place]



                K, CL, CO2, GLUCOSE, BUN,                                 Medica

l Branch



                CREATININE, CA)                                 

 

                POCT GLUCOSE (AUTOMATED) 2022 01:45:00 EdionweSwapnay  Uni

versity of [facility]

 

                POCT GLUCOSE (AUTOMATED) 2022 01:45:00 Edionwe, [facility]  Uni

versity of [facility]

 

                POCT GLUCOSE (AUTOMATED) 2022 21:36:00 Edionwe, [facility]  Uni

versity of [facility]

 

                POCT GLUCOSE (AUTOMATED) 2022 21:36:00 Edionwe [facility]  Uni

versity [facility]

 

                POCT GLUCOSE (AUTOMATED) 2022 16:45:00 Edionwe, [facility]  Uni

versity [facility]

 

                POCT GLUCOSE (AUTOMATED) 2022 16:45:00 Edionwe, [facility]  Uni

versity [facility]

 

                PHOSPHORUS      2022 13:17:00 [facility]

 

                MAGNESIUM       2022 13:17:00 Tabitha [facility]

 

                BASIC METABOLIC PANEL (NA, 2022 13:17:00 Kaylynn Lu  [facility]



                K, CL, CO2, GLUCOSE, BUN,                                 Medica

l Branch



                CREATININE, CA)                                 

 

                BASIC METABOLIC PANEL (NA, 2022 13:17:00 Kaylynn Lu  [facility]



                K, CL, CO2, GLUCOSE, BUN,                                 Medica

l Branch



                CREATININE, CA)                                 

 

                MAGNESIUM       2022 13:17:00 [facility]

 

                PHOSPHORUS      2022 13:17:00 Tabitha [facility]

 

                POCT GLUCOSE (AUTOMATED) 2022 12:42:00 Rachel Bailey

[facility]

 

                POCT GLUCOSE (AUTOMATED) 2022 12:42:00 Rachel Bailey  Uni

[facility]

 

                POCT GLUCOSE (AUTOMATED) 2022 01:08:00 Rahcel Bailey

[facility]

 

                POCT GLUCOSE (AUTOMATED) 2022 01:08:00 Edionwe, [facility]  Uni

versity [facility]

 

                URINALYSIS      2022 22:52:00 [facility]

 

                URINE CULTURE   2022 22:52:00 Tabitha [facility]

 

                URINALYSIS      2022 22:52:00 Tabitha [facility]

 

                URINE CULTURE   2022 22:52:00 Tabitha [facility]

 

                POCT GLUCOSE (AUTOMATED) 2022 21:40:00 Rachel Bailey

[facility]

 

                POCT GLUCOSE (AUTOMATED) 2022 21:40:00 Leo, [facility]  Uni

[facility]

 

                POCT GLUCOSE (AUTOMATED) 2022 16:02:00 Edsofia, [facility]  Uni

[facility]

 

                POCT GLUCOSE (AUTOMATED) 2022 16:02:00 Edsofia, [facility]  Uni

[facility]

 

                POCT GLUCOSE (AUTOMATED) 2022 13:08:00 Edionwe, [facility]  Uni

[facility]

 

                POCT GLUCOSE (AUTOMATED) 2022 13:08:00 Edsofia, [facility]  Uni

[facility]

 

                POCT GLUCOSE (AUTOMATED) 2022 10:58:00 Leo, [facility]  Uni

[facility]

 

                POCT GLUCOSE (AUTOMATED) 2022 10:58:00 Leo, [facility]  Tk20

[facility]

 

                LACTIC ACID WHOLE BLOOD 2022 09:47:00 Dean [facility]

 

                LACTIC ACID WHOLE BLOOD 2022 09:47:00 Refugio Moran [facility]

 

                PHOSPHORUS      2022 09:46:00 Refugio Moran [facility]

 

                MAGNESIUM       2022 09:46:00 Dean [facility]

 

                COMP. METABOLIC PANEL 2022 09:46:00 Refugio Moran [facility]



                ([id number]

 

                CBC WITH DIFF   2022 09:46:00 [facility]

 

                CBC WITH DIFF   2022 09:46:00 Refugio Moran [facility]

 

                COMP. METABOLIC PANEL 2022 09:46:00 Refugio Moran [facility]



                ([id number])                                         [facility] [facility]

 

                MAGNESIUM       2022 09:46:00 Refugio Moran [facility]

 

                PHOSPHORUS      2022 09:46:00 Refugio Moran [facility]

 

                POCT GLUCOSE (AUTOMATED) 2022 07:47:00 Edionwe, Mercy  Uni

versity of [facility]

 

                POCT GLUCOSE (AUTOMATED) 2022 07:47:00 Leo Mercy  Uni

[facility]

 

                POCT GLUCOSE (AUTOMATED) 2022 03:40:00 Leo, Mercy  Uni

[facility] of [facility]

 

                POCT GLUCOSE (AUTOMATED) 2022 03:40:00 Edsofia Mercy  Uni

[facility] of [facility]

 

                POCT GLUCOSE (AUTOMATED) 2022 00:43:00 Edionshraddha, Mercy  Uni

[facility] of [facility]

 

                POCT GLUCOSE (AUTOMATED) 2022 00:43:00 Leo Mercy  Uni

[facility] of [facility]

 

                BASIC METABOLIC PANEL (NA, 2022 23:29:00 [facility]io Moran

[facility]



                K, CL, CO2, GLUCOSE, BUN,                                 Medica

l Branch



                CREATININE, CA)                                 

 

                BASIC METABOLIC PANEL (NA, 2022 23:29:00 [facility]

[facility]



                K, CL, CO2, GLUCOSE, BUN,                                 Medica

l Branch



                CREATININE, CA)                                 

 

                POCT GLUCOSE (AUTOMATED) 2022 22:28:00 Leo Mercy  Uni

[facility]

 

                POCT GLUCOSE (AUTOMATED) 2022 22:28:00 Leo Mercy  Uni

[facility]

 

                US RETROPERITONEAL 2022 22:27:00 Refugio Moran

[facility] [place]



                COMPLETE                                        Medical Branch

 

                US RETROPERITONEAL 2022 22:27:00 Refugio Moran [facility]

[facility] [place]



                COMPLETE                                        Medical Branch

 

                CT ABDOMEN PELVIS WO 2022 20:05:00 Refugio [facility] [place]



                CONTRAST                                        Medical Branch

 

                CT ABDOMEN PELVIS WO 2022 20:05:00 Refugio [facility] [place]



                CONTRAST                                        Medical Branch

 

                POCT GLUCOSE (AUTOMATED) 2022 19:07:00 Leo Mercy  Uni

[facility]

 

                POCT GLUCOSE (AUTOMATED) 2022 19:07:00 Leo Mercy  Uni

[facility]

 

                POCT GLUCOSE (AUTOMATED) 2022 18:00:00 Rachel Bailey  [facility]

 

                POCT GLUCOSE (AUTOMATED) 2022 18:00:00 Rachel Bailey  [facility]

[facility]

 

                PROTEIN CREAT RATIO URINE 2022 17:45:00 Corby Peña 

[facility]

 

                PROTEIN CREAT RATIO URINE 2022 17:45:00 Corby Peña 

[facility]

 

                POCT GLUCOSE (AUTOMATED) 2022 17:25:00 Rachel Bailey  [facility]

[facility]

 

                POCT GLUCOSE (AUTOMATED) 2022 17:25:00 Rachel Bailey  [email or web address]

[facility]

 

                LACTIC ACID WHOLE BLOOD 2022 17:23:00 Refugio Moran [facility]

 

                LACTIC ACID WHOLE BLOOD 2022 17:23:00 Refugio Moran [facility]

 

                BASIC METABOLIC PANEL (NA, 2022 17:22:00 Refugio Moran U

[facility]



                K, CL, CO2, GLUCOSE, BUN,                                 Medica

l Branch



                CREATININE, CA)                                 

 

                BASIC METABOLIC PANEL (NA, 2022 17:22:00 Refugio Moran [facility]

[facility]



                K, CL, CO2, GLUCOSE, BUN,                                 Medica

l Branch



                CREATININE, CA)                                 

 

                POCT GLUCOSE (AUTOMATED) 2022 16:07:00 Rahcel Bailey  Uni

versity [facility]

 

                POCT GLUCOSE (AUTOMATED) 2022 16:07:00 Rachel Bailey  Uni

[facility]

 

                POCT GLUCOSE (AUTOMATED) 2022 15:04:00 Rachel Bailey  Uni

[facility]

 

                POCT GLUCOSE (AUTOMATED) 2022 15:04:00 Rachel Bailey  Uni

[facility]

 

                POCT GLUCOSE (AUTOMATED) 2022 13:57:00 Rachel Bailey  Uni

[facility]

 

                POCT GLUCOSE (AUTOMATED) 2022 13:57:00 Rachel Bailey  [id number]

[facility]

 

                URINE CULTURE   2022 13:50:00 [facility]

 

                URINE CULTURE   2022 13:50:00 [facility]

 

                LACTIC ACID WHOLE BLOOD 2022 12:57:00 Leo [facility]

 

                LACTIC ACID WHOLE BLOOD 2022 12:57:00 Edsofia [facility]

 

                POCT GLUCOSE (AUTOMATED) 2022 12:55:00 Edionshraddha, [facility]

 

                POCT GLUCOSE (AUTOMATED) 2022 12:55:00 Leo [facility]

 

                BASIC METABOLIC PANEL (NA, 2022 12:51:00 Leo, [facility]



                K, CL, CO2, GLUCOSE, BUN,                                 Medica

l Branch



                CREATININE, CA)                                 

 

                BASIC METABOLIC PANEL (NA, 2022 12:51:00 Edsofia, [facility]



                K, CL, CO2, GLUCOSE, BUN,                                 Medica

l Branch



                CREATININE, CA)                                 

 

                MRSA / MSSA SCREEN BY PCR, 2022 10:37:00 Leo [facility]

 

                MRSA / MSSA SCREEN BY PCR, 2022 10:37:00 Leo [facility]

 

                URINE DRUG (IMMUNOASSAY) - 2022 10:36:00 Rachel Bailey  [facility]



                COMPREHENSIVE DRUG SCREEN                                 Medica

l Branch

 

                LACTIC ACID WHOLE BLOOD 2022 10:36:00 Edsofia [facility]

 

                URINE DRUG (LCMSMS) - 2022 10:36:00 Leo [facility]



                SYNTHETIC OPIATES PANEL                                 [facility]

 

                LACTIC ACID WHOLE BLOOD 2022 10:36:00 Leo [facility]

 

                URINE DRUG (IMMUNOASSAY) - 2022 10:36:00 Edsofia [facility]



                COMPREHENSIVE DRUG SCREEN                                 Medica

l Branch

 

                URINE DRUG (LCMSMS) - 2022 [facility]:36:[facility]



                OPIATES PANEL                                   Medical Branch

 

                PHOSPHORUS      2022 08:58:00 Edionshraddha [facility]

 

                MAGNESIUM       2022 08:58:00 [facility]

 

                BETA HYDROXY-BUTYRATE 2022 08:58:00 Ashly Ortega  [facility]

 

                BASIC METABOLIC PANEL (NA, 2022 08:58:00 [facility]



                K, CL, CO2, GLUCOSE, BUN,                                 Medica

l Branch



                CREATININE, CA)                                 

 

                BETA HYDROXY-BUTYRATE 2022 08:58:00 Ashly Ortega [facility]

 

                BASIC METABOLIC PANEL (NA, 2022 08:58:00 [facility]



                K, CL, CO2, GLUCOSE, BUN,                                 Medica

l Branch



                CREATININE, CA)                                 

 

                MAGNESIUM       2022 08:58:00 Edsofia [facility]

 

                PHOSPHORUS      2022 08:58:00 [facility]

 

                CBC WITH DIFF   2022 06:40:00 [facility]

 

                LACTIC ACID WHO[facility]LOOD 2022 [facility]40:00 [facility]

 

                LACTIC ACID WHOLE BLOOD 2022 06:40:00 [facility]

 

                CBC WITH DIFF   2022 06:40:00 [facility]

 

                POCT GLUCOSE (AUTOMATED) 2022 [facility]:14[facility]

 

                POCT [facility]UCOSE (AUTOMATED) 2022 [facility]:14:00 [facility]

 

                ED BLADDER CATHETERIZATION 2022 04:35:00 OrtegaAshly garcia  U

[facility]

 

                ED BLADDER CATHETERIZATION 2022 04:35:00 Ashly Ortega  U

[facility]

 

                POCT GLUCOSE (AUTOMATED) 2022 03:51:00 Leo, Mercy  [facility]

 

                POCT GLUCOSE (AUTOMATED) 2022 03:51:00 Leo Mercy  [facility]

 

                XR CHEST 1 VW   2022 02:43:07 Ashly Ortega  [facility]

 

                XR ANKLE 3+ VW LEFT 2022 02:43:07 Ashly Ortega  [facility]

 

                XR CHEST 1 VW   2022 02:43:07 Ashly Ortega  [facility]

 

                XR ANKLE 3+ VW LEFT 2022 02:43:07 Ashly Ortega  [facility]

 

                LACTIC ACID WHOLE BLOOD 2022 02:30:00 Ashly Ortega  [facility]

 

                LACTIC ACID WHOLE BLOOD 2022 02:30:00 Ashly Ortega  [facility]

 

                URINALYSIS      2022 01:38:00 Ashly Ortega  [facility]

 

                URINALYSIS      2022 01:38:00 Ashly Ortega  [facility]

 

                POCT GLUCOSE (AUTOMATED) 2022 01:36:00 Ashly Ortega  [facility]

 

                POCT GLUCOSE (AUTOMATED) 2022 01:36:00 Ashly Ortega  [facility]

 

                PHOSPHORUS      2022 01:24:00 Ashly Ortega  [facility]

 

                LIPASE          2022 01:24:00 Ashly Ortega  [facility]

 

                MAGNESIUM       2022 01:24:00 Ashly Ortega  [facility]

 

                TROPONIN I      2022 01:24:00 Ashly Ortega  [facility]

 

                COMP. METABOLIC PANEL 2022 01:24:00 Ashly Ortega  [facility]



                ([id number])                                         [facility]

 

                SALICYLATE      2022 01:24:00 Ashly Ortega  [facility]

 

                ETHANOL         2022 01:24:00 Ashly Ortega  [facility]

 

                COMP. METABOLIC PANEL 2022 01:24:00 Ashly Ortega  [facility]



                ([id number])                                         Medical Branch

 

                LIPASE          2022 01:24:00 Ashly Ortega  [facility]

 

                TROPONIN I      2022 01:24:00 Ashly Ortega  [facility]

 

                ETHANOL         2022 01:24:00 Ashly Ortega  [facility]

 

                ACETAMINOPHEN   2022 01:24:00 Ashly Ortega  [facility]

 

                PHOSPHORUS      2022 01:24:00 Ashly Ortega  [facility]

 

                MAGNESIUM       2022 01:24:00 Ashly Ortega  [facility]

 

                CT LUMBAR SPINE WO 2022 01:16:52 Ashly Ortega  [facility]



                CONTRAST                                        [facility]

 

                CT THORACIC SPINE WO 2022 01:16:52 Ashly Ortega  [facility]



                CONTRAST                                        [facility]

 

                CT THORACIC SPINE WO 2022 01:16:52 Ashly Ortega  [facility]



                CONTRAST                                        [facility]

 

                CT LUMBAR SPINE WO 2022 01:16:52 Ashly Ortega  [facility]



                CONTRAST                                        [facility]

 

                CT CERVICAL SPINE WO 2022 01:16:24 Ashly Ortega  [facility]



                CONTRAST                                        [facility]

 

                CT HEAD WO CONTRAST 2022 01:16:24 Ashly Ortega  [facility]

 

                CT HEAD WO CONTRAST 2022 01:16:24 Ashly Ortega  [facility]

 

                CT CERVICAL SPINE WO 2022 01:16:24 Ashly Ortega  [facility]



                CONTRAST                                        [facility]

 

                BLOOD CULTURE SCREEN 2022 00:41:00 Ashly Ortega  [facility]

 

                BLOOD CULTURE SCREEN 2022 00:41:00 Ashly Ortega  [facility]

 

                COVID-19 (ID NOW RAPID 2022 00:30:00 Ashly Ortega  [facility]



                TESTING)                                        Medical Branch

 

                LAB ONLY COVID  2022 00:30:00 Ashly Ortega  [facility]



                INTERPRETATION                                  Medical Branch

 

                COVID-19 (ID NOW RAPID 2022 00:30:00 Ashly Ortega  [facility]



                TESTING)                                        Medical Branch

 

                LAB ONLY COVID  2022 00:30:00 Ashly Ortega  [facility]



                INTERPRETATION                                  Medical Branch

 

                POCT GLUCOSE (AUTOMATED) 2022 00:05:00 Ashly Ortega  [facility]

 

                POCT GLUCOSE (AUTOMATED) 2022 00:05:00 Ashly Ortega  [facility]

 

                ACUTE CARE VENOUS BLOOD 2022 23:53:00 Ashly Ortega  [facility]

 

                ACUTE CARE VENOUS BLOOD 2022 23:53:00 Ashly Ortega  [facility]



                GAS                                             Medical Branch

 

                CBC WITH DIFF   2022 23:48:00 Ashly Ortega  [facility]

 

                CBC WITH DIFF   2022 23:48:00 Ashly Ortega  [facility]

 

                LACTIC ACID WHOLE BLOOD 2022 23:47:00 Ashly Ortega  [facility]



                                                                [facility] [place]

 

                LACTIC ACID WHOLE BLOOD 2022 23:47:00 Ashly Ortega  [facility]

 

                HB ECG ROUTINE & RHYTHM 2022 23:31:40 Ashly Ortega  [facility]                                           Medical Branch

 

                HB ECG ROUTINE & RHYTHM 2022 23:31:40 Ashly Ortega  [facility]                                           Medical Branch

 

                EMERGENCY DEPARTMENT 2022 05:01:00 Doctor Derrell, [facility]



                DOCUMENTS                       [place]         Medical Branch

 

                EMERGENCY DEPARTMENT 2022 05:01:00 Doctor Derrell, [facility]



                DOCUMENTS                       [place]         Medical Branch

 

                HOSPITAL ADMISSION 2022 05:01:00 Doctor Derrell [facility]



                                                [place]         Medical Branch

 

                CT FEMUR LEFT W CONTRAST 2022 02:34:07 Singer, Chris Uni

[facility]

 

                CT FEMUR LEFT W CONTRAST 2022 02:34:07 Chris Sol

[facility]

 

                POCT GLUCOSE(AGE >30DAYS) 2022 01:30:00 Chris Sol

[facility]

 

                POCT GLUCOSE(AGE >30DAYS) 2022 01:30:00 Chris Sol

[facility]

 

                POCT GLUCOSE (AUTOMATED) 2022 01:28:00 [facility]

 

                POCT GLUCOSE (AUTOMATED) 2022 01:28:00 Alond[facility]

 

                POCT GLUCOSE (AUTOMATED) 2022 00:38:00 [facility]

 

                POCT GLUCOSE (AUTOMATED) 2022 00:38:00 [facility]

 

                URINALYSIS      2022-07-15 23:58:00 Tom [facility]

 

                URINALYSIS      2022-07-15 23:58:00 Alondra Santos [facility]

 

                POCT GLUCOSE (AUTOMATED) 2022-07-15 23:26:00 [facility]

 

                POCT GLUCOSE (AUTOMATED) 2022-07-15 23:26:00 Alondra [facility]

 

                BASIC METABOLIC PANEL (NA, 2022-07-15 22:24:00 Alondra aSntos [facility]



                K, CL, CO2, GLUCOSE, BUN,                                 Medica

l Branch



                CREATININE, CA)                                 

 

                CBC WITH DIFF   2022-07-15 22:24:00 Tom [facility]

 

                COVID-19 (ID NOW RAPID 2022-07-15 22:24:00 Alondra Santos [facility]

 

                CBC WITH DIFF   2022-07-15 22:24:00 Tom [facility]

 

                BASIC METABOLIC PANEL (NA, 2022-07-15 22:24:00 Tom [facility]



                K, CL, CO2, GLUCOSE, BUN,                                 Medica

l Branch



                CREATININE, CA)                                 

 

                COVID-19 (ID NOW RAPID 2022-07-15 22:24:00 Alondra Santos [facility]



                TESTING)                                        Medical Branch

 

                LAB ONLY COVID  2022-07-15 22:24:00 Alondra Santos [facility]



                INTERPRETATION                                  Medical Branch

 

                EMERGENCY SERVICES 2022-07-15 05:01:00 Doctor Unassigned, [facility]



                AGREEMENTS AND                  [place]         Medical Branch



                AUTHORIZATIONS                                  

 

                EMERGENCY DEPARTMENT 2022-07-15 05:01:00 Doctor Unassigned, [facility]



                DOCUMENTS                       [place]         Medical Branch

 

                LIPASE          2022 10:37:00 Radha Fofana [facility]

 

                COMP. METABOLIC PANEL 2022 10:37:00 Radha Fofana [facility]



                ([id number])                                         Medical Branch

 

                CBC WITH DIFF   2022 10:37:00 Radha Fofana [facility]

 

                AC PANEL 21 + LACTIC ACID 2022 10:37:00 Radha Fofana [facility]

 

                CBC WITH DIFF   2022 10:37:00 Radha Fofana [facility]

 

                COMP. METABOLIC PANEL 2022 10:37:00 Radha Fofana [facility]



                ([id number])                                         [facility]

 

                AC PANEL 21 + LACTIC ACID 2022 10:37:00 Radha Fofana [facility]

 

                LIPASE          2022 10:37:00 Radha Fofana [facility]

 

                URINALYSIS      2022 10:24:00 Radha Fofana [facility]

 

                URINALYSIS      2022 10:24:00 Radha Fofana [facility]

 

                EMERGENCY SERVICES 2022 05:01:00 Doctor Jackiessigned, [facility]



                AGREEMENTS AND                  [place]         Medical Branch



                AUTHORIZATIONS                                  

 

                POCT GLUCOSE (AUTOMATED) 2022 22:53:00 Radha Fofana [facility]

 

                POCT GLUCOSE (AUTOMATED) 2022 22:53:00 Radha Fofana [facility]

 

                POCT GLUCOSE (AUTOMATED) 2022 12:17:00 Radha Fofana [facility]

 

                POCT GLUCOSE (AUTOMATED) 2022 12:17:00 Radha Fofana Uni

[facility] [place]



                                                                Medical Branch

 

                POCT GLUCOSE (AUTOMATED) 2022 04:47:00 Radha Fofana Uni

[facility] [place]



                                                                Medical Branch

 

                POCT GLUCOSE (AUTOMATED) 2022 04:47:00 Radha Fofana Uni

[facility] [place]



                                                                Medical Branch

 

                POCT GLUCOSE (AUTOMATED) 2022 01:43:00 Radha Fofana Uni

[facility] [place]



                                                                Medical Branch

 

                POCT GLUCOSE (AUTOMATED) 2022 01:43:00 Radha Fofana Uni

[facility] [place]



                                                                Medical Branch

 

                POCT GLUCOSE (AUTOMATED) 2022 21:51:00 Radha Fofana Uni

[facility] [place]



                                                                Medical Branch

 

                POCT GLUCOSE (AUTOMATED) 2022 21:51:00 Radha Fofana Uni

[facility] [place]



                                                                Medical Branch

 

                POCT GLUCOSE (AUTOMATED) 2022 17:04:00 Radha Fofana Uni

[facility] [place]



                                                                Medical Branch

 

                POCT GLUCOSE (AUTOMATED) 2022 17:04:00 Radha Fofana Uni

[facility] [place]



                                                                Medical Branch

 

                POCT GLUCOSE (AUTOMATED) 2022 13:09:00 Radha Fofana Uni

[facility] [place]



                                                                Medical Branch

 

                POCT GLUCOSE (AUTOMATED) 2022 13:09:00 Radha Fofana Uni

[facility] [place]



                                                                Medical Branch

 

                POCT GLUCOSE (AUTOMATED) 2022 08:32:00 Radha Fofana Uni

[facility] [place]



                                                                Medical Branch

 

                POCT GLUCOSE (AUTOMATED) 2022 08:32:00 Radha Fofana Uni

[facility] [place]



                                                                Medical Branch

 

                POCT GLUCOSE (AUTOMATED) 2022 05:45:00 Radha Fofana Uni

[facility] [place]



                                                                Medical Branch

 

                POCT GLUCOSE (AUTOMATED) 2022 05:45:00 Radha Fofana Uni

[facility] [place]



                                                                Medical Branch

 

                POCT GLUCOSE (AUTOMATED) 2022 02:43:00 Radha Fofana Uni

[facility] [place]



                                                                Medical Branch

 

                POCT GLUCOSE (AUTOMATED) 2022 02:43:00 Radha Fofana Uni

[facility] [place]



                                                                Medical Branch

 

                POCT GLUCOSE (AUTOMATED) 2022 22:37:00 Radha Fofana Uni

[facility] [place]



                                                                Medical Branch

 

                POCT GLUCOSE (AUTOMATED) 2022 22:37:00 Radha Fofana

[facility]

 

                POCT GLUCOSE (AUTOMATED) 2022 18:04:00 Radha Fofana

[facility]

 

                POCT GLUCOSE (AUTOMATED) 2022 18:04:00 Radha Fofana [facility]

 

                BASIC METABOLIC PANEL (NA, 2022 14:22:00 Phillips, Noe    [facility]

[facility] [place]



                K, CL, CO2, GLUCOSE, BUN,                                 Medica

l Branch



                CREATININE, CA)                                 

 

                BASIC METABOLIC PANEL (NA, 2022 14:22:00 Phillips, Noe    [facility]

[facility] [place]



                K, CL, CO2, GLUCOSE, BUN,                                 Medica

l Branch



                CREATININE, CA)                                 

 

                POCT GLUCOSE (AUTOMATED) 2022 13:30:00 Radha Fofana

[facility]

 

                POCT GLUCOSE (AUTOMATED) 2022 13:30:00 Radha Fofana

[facility]

 

                CRITICAL CARE   2022 11:11:00 Radha Fofana [facility]

 

                CRITICAL CARE   2022 11:11:00 Radha Fofana [facility]

 

                POCT GLUCOSE (AUTOMATED) 2022 11:01:00 Radha Fofana

[facility]

 

                POCT GLUCOSE (AUTOMATED) 2022 11:01:00 Radha Fofana

[facility]

 

                POCT GLUCOSE (AUTOMATED) 2022 07:19:00 Radha Fofana

[facility]

 

                POCT GLUCOSE (AUTOMATED) 2022 07:19:00 Radha Fofana

[facility]

 

                BASIC METABOLIC PANEL (NA, 2022 05:53:00 [facility] 

[place]



                K, CL, CO2, GLUCOSE, BUN,                                 Medica

l Branch



                CREATININE, CA)                                 

 

                BASIC METABOLIC PANEL (NA, 2022 05:53:00 [facility]ha [facility] 

[place]



                K, CL, CO2, GLUCOSE, BUN,                                 Medica

l Branch



                CREATININE, CA)                                 

 

                POCT GLUCOSE (AUTOMATED) 2022 04:41:00 Fofana, Radha [facility]

 

                POCT GLUCOSE (AUTOMATED) 2022 04:41:00 Radha Fofana [facility]

 

                URINALYSIS      2022 03:59:00 Radha Fofana [facility]

 

                URINE CULTURE   2022 03:59:00 Radha Fofana [facility]

 

                URINALYSIS      2022 03:59:00 Radha Fofana [facility]

 

                URINE CULTURE   2022 03:59:00 Radha Fofana [facility]

 

                POCT GLUCOSE (AUTOMATED) 2022 03:48:00 Radha Fofana [facility]

 

                POCT GLUCOSE (AUTOMATED) 2022 03:48:00 Radha Fofana [facility]

 

                AC PANEL 21 + LACTIC ACID 2022 02:23:00 Radha Fofana 

[facility]

 

                AC PANEL 21 + LACTIC ACID 2022 02:23:00 Radha Fofana [facility]

 

                LIPASE          2022 02:22:00 Radha Fofana [facility]

 

                COMP. METABOLIC PANEL 2022 02:22:00 Radha Fofana [facility]



                ([id number])                                         Medical Branch

 

                CBC WITH DIFF   2022 02:22:00 Radha Fofana [facility]

 

                COVID-19 (ID NOW RAPID 2022 02:22:00 Radha Fofana [facility]



                TESTING)                                        Medical Branch

 

                LAB ONLY COVID  2022 02:22:00 Radha Fofana [facility]



                INTERPRETATION                                  [facility]

 

                CBC WITH DIFF   2022 02:22:00 Radha Fofana [facility]

 

                COMP. METABOLIC PANEL 2022 02:22:00 Radha Fofana [facility]



                ([id number])                                         Medical Branch

 

                LIPASE          2022 02:22:00 Radha Fofana [facility]

 

                COVID-19 (ID NOW RAPID 2022 02:22:00 Radha Fofana [facility]



                TESTING)                                        Medical Branch

 

                LAB ONLY COVID  2022 02:22:00 Radha Fofana

[facility] [place]



                INTERPRETATION                                  [facility]

 

                HOSPITAL ADMISSION 2022 05:01:00 Doctor Unassigned, [facility]

[facility] [place]



                                                [place]         [facility]

 

                EMERGENCY DEPARTMENT 2022 05:01:00 Doctor Unassigned, [facility]



                DOCUMENTS                       [place]         [facility]

 

                HOSPITAL ADMISSION 2022 05:01:00 Doctor Unassigned, [facility]

 

                POCT GLUCOSE (AUTOMATED) 2022 17:03:00 Edwardo Lopez

[facility]

 

                POCT GLUCOSE (AUTOMATED) 2022 16:09:00 Edwardo Lopez

[facility]

 

                HEPATIC FUNCTION PANEL 2022 09:01:00 Ashly Lees [facility]



                ([id number]) (ALB,T.PRO,BILI                                 Medical 

Branch



                T,BU/BC,ALT,AST,ALK PHOS)                                 

 

                BASIC METABOLIC PANEL (NA, 2022 [facility]:01:00 [facility]



                K, CL, CO2, GLUCOSE, BUN,                                 Medica

l Branch



                CREATININE, CA)                                 

 

                CBC WITH DIFF   2022 09:01:00 Nabeel Ashlymaryann Joshua [facility]

 

                CBC WITH DIFF   2022 09:01:00 Nabeel Ashly Josiane [facility]

 

                BASIC METABOLIC PANEL (NA, 2022 09:01:00 [facility]



                K, CL, CO2, GLUCOSE, BUN,                                 Medica

l Branch



                CREATININE, CA)                                 

 

                HEPATIC FUNCTION PANEL 2022 09:01:00 Ashly Lees [facility]



                ([id number]) (ALB,T.PRO,BILI                                 Medical 

Branch



                T,BU/BC,ALT,AST,ALK PHOS)                                 

 

                POCT GLUCOSE (AUTOMATED) 2022 08:58:00 Edwardo Lopez

[facility]

 

                POCT GLUCOSE (AUTOMATED) 2022 06:53:00 Edwardo Lopez

[facility]

 

                POCT GLUCOSE (AUTOMATED) 2022 05:31:00 Edwardo Lopez

[facility]

 

                POCT GLUCOSE (AUTOMATED) 2022 02:16:00 John, Edwardo   [facility]

 

                POCT GLUCOSE (AUTOMATED) 2022 21:50:00 Edwardo Lopez   [facility]

 

                BASIC METABOLIC PANEL (NA, 2022 20:45:00 Oville, Kaylynn  U

[facility]



                K, CL, CO2, GLUCOSE, BUN,                                 Medica

l Branch



                CREATININE, CA)                                 

 

                CBC WITH DIFF   2022 20:45:00 Tabitha [facility]

 

                BASIC METABOLIC PANEL (NA, 2022 20:45:00 Oville, Kaylynn  [facility]



                K, CL, CO2, GLUCOSE, BUN,                                 Medica

l Branch



                CREATININE, CA)                                 

 

                CBC WITH DI[facility]   2022 [facility]:45:[facility]

 

                POCT GLUCOSE (AUTOMATED) 2022 16:44:00 Edwardo Lopez   [facility]

 

                POCT GLUCOSE (AUTOMATED) 2022 13:41:00 Edwardo Lopez   [facility]

 

                URINE CULTURE   2022 06:41:00 Josse Giles [facility]

 

                URINE CULTURE   2022 06:41:00 Josse Giles [facility]

 

                POCT GLUCOSE (AUTOMATED) 2022 06:10:00 Josse Giles [facility]

 

                CT ABDOMEN PELVIS W 2022 05:08:00 Josse Giles [facility]



                CONTRAST                                        [facility]

 

                CT ABDOMEN PELVIS W 2022 05:08:00 Josse Giles [facility]



                CONTRAST                                        [facility]

 

                URINALYSIS      2022 04:34:00 Josse Giles [facility]

 

                COVID-19 (ID NOW RAPID 2022 04:34:00 Josse Giles [facility]



                TESTING)                                        Medical Branch

 

                LAB ONLY COVID  2022 04:34:00 Josse Giles [facility]



                INTERPRETATION                                  [facility]

 

                URINALYSIS      2022 04:34:00 Josse Giles [facility]

 

                COVID-19 (ID NOW RAPID 2022 04:34:00 Josse Giles [facility]



                TESTING)                                        Medical Branch

 

                LAB ONLY COVID  2022 04:34:00 Josse Giles [facility]



                INTERPRETATION                                  [facility]

 

                LIPASE          2022 03:57:00 Josse Giles [facility]

 

                COMP. METABOLIC PANEL 2022 03:57:00 Josse Giles [facility]



                ([id number])                                         [facility]

 

                CBC WITH DIFF   2022 03:57:00 Josse Giles [facility]

 

                CBC WITH DIFF   2022 03:57:00 Josse Giles [facility]

 

                COMP. METABOLIC PANEL 2022 03:57:00 Josse Giles [facility]



                ([id number])                                         Medical Branch

 

                LIPASE          2022 03:57:00 Josse Giles [facility]

 

                EMERGENCY DEPARTMENT 2022 05:01:00 Doctor Unassigned, [facility]



                DOCUMENTS                       [place]         [facility]

 

                HOSPITAL ADMISSION 2022 05:01:00 Doctor Unassigned, [facility]



                                                [place]         [facility]

 

                POCT GLUCOSE (AUTOMATED) 2022-06-10 21:48:00 Rachel Bailey  Uni

[facility]

 

                POCT GLUCOSE (AUTOMATED) 2022-06-10 17:03:00 EdRachel black  [email or web address]

[facility]

 

                POCT GLUCOSE (AUTOMATED) 2022-06-10 17:03:00 EdionRachel llanes  Uni

[facility]

 

                POCT GLUCOSE (AUTOMATED) 2022-06-10 12:44:00 Edionwe, [facility]cy  [facility]

 

                POCT GLUCOSE (AUTOMATED) 2022-06-10 12:44:00 EdRachel black  [email or web address]

[facility]

 

                BASIC METABOLIC PANEL (NA, 2022-06-10 10:29:00 Edwardo Lopez

[facility]



                K, CL, CO2, GLUCOSE, BUN,                                 Medica

l Branch



                CREATININE, CA)                                 

 

                CBC WITH DIFF   2022-06-10 10:29:00 Edwardo Lopez o

f [facility]

 

                MAGNESIUM       2022-06-10 10:29:00 Edwardo Lopez   [facility]

 

                MAGNESIUM       2022-06-10 10:29:00 Edwardo Lopez   [facility]

 

                BASIC METABOLIC PANEL (NA, 2022-06-10 10:29:00 Edwardo Lopez   [facility]

[facility] [place]



                K, CL, CO2, GLUCOSE, BUN,                                 Medica

l Branch



                CREATININE, CA)                                 

 

                CBC WITH DIFF   2022-06-10 10:29:00 Edwardo Lopez   [facility]

 

                POCT GLUCOSE (AUTOMATED) 2022-06-10 10:28:00 Edionwe, Mercy  Uni

versity of [facility]

 

                POCT GLUCOSE (AUTOMATED) 2022-06-10 10:28:00 Edionwe, Mercy  Uni

versity of [facility]

 

                POCT GLUCOSE (AUTOMATED) 2022-06-10 05:33:00 Edionwe, Mercy  Uni

versity of [facility]

 

                POCT GLUCOSE (AUTOMATED) 2022-06-10 05:33:00 Edionwe, Mercy  Uni

[facility] [place]



                                                                Medical Branch

 

                POCT GLUCOSE (AUTOMATED) 2022-06-10 04:37:00 Edionwe, Mercy  Uni

[facility] [place]



                                                                Medical Branch

 

                POCT GLUCOSE (AUTOMATED) 2022-06-10 04:37:00 Edionwe, Mercy  Uni

[facility] [place]



                                                                Medical Branch

 

                POCT GLUCOSE (AUTOMATED) 2022-06-10 02:29:00 Edionwe, Mercy  Uni

[facility] [place]



                                                                Medical Branch

 

                POCT GLUCOSE (AUTOMATED) 2022-06-10 02:29:00 Edionwe, Mercy  Uni

[facility] [place]



                                                                Medical Branch

 

                POCT GLUCOSE (AUTOMATED) 2022-06-10 00:52:00 Edionwe, Mercy  Uni

[facility] [place]



                                                                Medical Branch

 

                POCT GLUCOSE (AUTOMATED) 2022-06-10 00:52:00 Edionwe, Mercy  Uni

[facility] [place]



                                                                Medical Branch

 

                POCT GLUCOSE (AUTOMATED) 2022 21:52:00 Edionwe, Mercy  Uni

[facility] [place]



                                                                Medical Branch

 

                POCT GLUCOSE (AUTOMATED) 2022 21:52:00 Edionwe, Mercy  Uni

[facility] [place]



                                                                Medical Branch

 

                POCT GLUCOSE (AUTOMATED) 2022 16:42:00 Edionwe, Mercy  Uni

versity of [facility]

 

                POCT GLUCOSE (AUTOMATED) 2022 16:42:00 LeoSwapnay  [email or web address]

[facility]

 

                XR CHEST 1 VW   2022 14:05:00 John [facility]

 

                XR SKULL <4 VW  2022 14:05:00 John [facility]

 

                XR ABDOMEN 2 VW 2022 14:05:00 John [facility]

 

                XR ABDOMEN 2 VW 2022 14:05:00 John [facility]

 

                XR CHEST 1 VW   2022 14:05:00 John [facility]

 

                XR SKULL <4 VW  2022 14:05:00 John [facility]

 

                POCT GLUCOSE (AUTOMATED) 2022 12:47:00 Leo Mercy  [email or web address]

[facility]

 

                POCT GLUCOSE (AUTOMATED) 2022 12:47:00 Leo Mercy  [email or web address]

[facility]

 

                POCT GLUCOSE (AUTOMATED) 2022 08:59:00 Leo Mercy  [email or web address]

[facility]

 

                POCT GLUCOSE (AUTOMATED) 2022 08:59:00 Leo Mercy  [email or web address]

[facility]

 

                GLYCOSYLATED HEMOGLOBIN 2022 08:56:00 Refugio Moran [facility]



                (A1C)                                           [facility]

 

                CBC WITH DIFF   2022 08:56:00 Refugio Moran [facility]

 

                BASIC METABOLIC PANEL (NA, 2022 08:56:00 Refugio Moran

[facility]



                K, CL, CO2, GLUCOSE, BUN,                                 Medica

l Branch



                CREATININE, CA)                                 

 

                MAGNESIUM       2022 08:56:00 Refugio Moran [facility]

 

                PHOSPHORUS      2022 08:56:00 Refugio Moran [facility]

 

                PHOSPHORUS      2022 08:56:00 Refugio Moran [facility]

 

                MAGNESIUM       2022 08:56:00 Refugio Moran [facility]

 

                BASIC METABOLIC PANEL (NA, 2022 08:56:00 Refugio Moran [facility]



                K, CL, CO2, GLUCOSE, BUN,                                 Medica

l Branch



                CREATININE, CA)                                 

 

                CBC WITH DIFF   2022 08:56:00 Refugio Moran [place] o

[facility]

 

                GLYCOS[facility]OGLOBIN 2022 [facility]:56[facility]00 [facility]



                (A1C)                                           [facility]

 

                POCT GLUCOSE (AUTOMATED) 2022 04:27:00 Edionwe, Mercy  Uni

versity of [facility]

 

                POCT GLUCOSE (AUTOMATED) 2022 04:27:00 Edionwe, Mercy  Uni

versity of [facility]

 

                POCT GLUCOSE (AUTOMATED) 2022 01:11:00 Edionwe, Mercy  Uni

versity of [facility]

 

                POCT GLUCOSE (AUTOMATED) 2022 01:11:00 Edionwe, Mercy  Uni

versity of [facility]

 

                POCT GLUCOSE (AUTOMATED) 2022 21:02:00 Edionwe, Mercy  Uni

versity of [facility]

 

                POCT GLUCOSE (AUTOMATED) 2022 21:02:00 Edionwe, Mercy  Uni

versity of [facility]

 

                POCT GLUCOSE (AUTOMATED) 2022 16:08:00 Edionwe, Mercy  Uni

versity of [facility]

 

                POCT GLUCOSE (AUTOMATED) 2022 16:08:00 Edionwe, Mercy  Uni

versity of [facility]

 

                POCT GLUCOSE (AUTOMATED) 2022 12:39:00 Edionwe, Mercy  Uni

versity of [facility]

 

                POCT GLUCOSE (AUTOMATED) 2022 12:39:00 Edionwe, Mercy  Uni

versity of [facility]

 

                LACTIC ACID WHOLE BLOOD 2022 09:25:00 Shelton Cunningham [facility]

 

                CBC WITH DIFF   2022 09:25:00 Leo [facility] o

[facility]

 

                BASIC METABOLIC PANEL (NA, 2022 09:25:00 EdRachel black  [facility]



                K, CL, CO2, GLUCOSE, BUN,                                 Medica

l Branch



                CREATININE, CA)                                 

 

                BASIC METABOLIC PANEL (NA, 2022 09:25:00 Rachel Bailey  U

[facility]



                K, CL, CO2, GLUCOSE, BUN,                                 Medica

l Branch



                CREATININE, CA)                                 

 

                CBC WITH DIFF   2022 09:25:00 Leo [facility]

 

                LACTIC ACID WHOLE BLOOD 2022 09:25:00 Octavio [facility]

 

                POCT GLUCOSE (AUTOMATED) 2022 08:56:00 Leo [facility]

 

                POCT GLUCOSE (AUTOMATED) 2022 08:56:00 Leo [facility]

 

                POCT GLUCOSE (AUTOMATED) 2022 04:51:00 Leo [facility]

 

                POCT GLUCOSE (AUTOMATED) 2022 04:51:00 Leo [facility]

 

                URINALYSIS      2022 02:47:00 Octavio [facility]

 

                URINALYSIS      2022 02:47:00 Octavio [facility]

 

                POCT GLUCOSE (AUTOMATED) 2022 02:29:00 Octavio [facility]

 

                POCT GLUCOSE (AUTOMATED) 2022 02:29:00 Octavio [facility]

 

                HB ECG ROUTINE & RHYTHM 2022 00:33:[address] Octav[address] [facility] [place]

 

                HB ECG ROUTINE & RHYTHM 2022 00:33:38 Octavio [facility] [place]

 

                URINALYSIS      2022 00:28:00 Octavoi [facility]

 

                URINE CULTURE   2022 00:28:00 Octavio [facility]

 

                URINALYSIS      2022 00:28:00 Octavio [facility]

 

                URINE CULTURE   2022 00:28:00 Octavio [facility]

 

                CBC WITH DIFF   2022 00:25:00 Octavio [facility]

 

                COMP. METABOLIC PANEL 2022 00:25:00 Octavio [facility]



                ([id number])                                         Medical Branch

 

                LIPASE          2022 00:25:00 Octavio [facility]

 

                LACTIC ACID WHOLE BLOOD 2022 00:25:00 Octavio [facility]

 

                ACUTE CARE VENOUS BLOOD 2022 00:25:00 Octavio [facility]

 

                BLOOD CULTURE SCREEN 2022 00:25:00 Octavio [facility]

 

                BLOOD CULTURE SCREEN 2022 00:25:00 Octavio [facility]

 

                LIPASE          2022 00:25:00 Octavio [facility]

 

                COMP. METABOLIC PANEL 2022 00:25:00 Octavio [facility]



                ([id number])                                         [facility]

 

                ACUTE CARE VENOUS BLOOD 2022 00:25:00 Octavio [facility]

 

                CBC WITH DIFF   2022 00:25:00 Octavio [facility]

 

                LACTIC ACID WHOLE BLOOD 2022 00:25:00 Octavio [facility]

 

                EMERGENCY DEPARTMENT 2022 05:01:00 Doctor Unassigned, [facility]



                DOCUMENTS                       [place]         Medical Branch

 

                HOSPITAL ADMISSION 2022 05:01:00 Doctor Unassigned, [facility]

 

                CBC WITH DIFF   2022 17:57:00 Alan [facility]

 

                BASIC METABOLIC PANEL (NA, 2022 17:57:00 Simran Phillips    [facility]



                K, CL, CO2, GLUCOSE, BUN,                                 Medica

l Branch



                CREATININE, CA)                                 

 

                MAGNESIUM       2022 17:57:00 Alan [facility]

 

                MAGNESIUM       2022 17:57:00 Alan [facility]

 

                BASIC METABOLIC PANEL (NA, 2022 17:57:00 Simran Phillips    [facility]



                K, CL, CO2, GLUCOSE, BUN,                                 Medica

l Branch



                CREATININE, CA)                                 

 

                CBC WITH DIFF   2022 17:57:00 Simran Phillips o

f [facility]

 

                POCT GLUCOSE (AUTOMATED) 2022 16:49:00 Terminella, Efrain U

niversity of 

[facility]

 

                POCT GLUCOSE (AUTOMATED) 2022 16:49:00 Terminella, Efrain U

niversity of 

[facility]

 

                POCT GLUCOSE (AUTOMATED) 2022 12:56:00 Terminella, Efrain U

niversity of 

[facility]

 

                POCT GLUCOSE (AUTOMATED) 2022 12:56:00 Terminella, Efrain U

niversity of 

[facility]

 

                POCT GLUCOSE (AUTOMATED) 2022 09:41:00 Terminella, Efrain U

niversity of 

[facility]

 

                POCT GLUCOSE (AUTOMATED) 2022 09:41:00 Terminella, Efrain U

niversity of 

[facility]

 

                POCT GLUCOSE (AUTOMATED) 2022 05:08:00 Terminella, Efrain U

niversity of 

[facility]

 

                POCT GLUCOSE (AUTOMATED) 2022 05:08:00 Terminella, Efrain U

niversity of 

[facility]

 

                POCT GLUCOSE (AUTOMATED) 2022 01:26:00 Terminella, Efrain U

niversity of 

[facility]

 

                POCT GLUCOSE (AUTOMATED) 2022 01:26:00 Terminella, Efrain U

niversity of 

[facility]

 

                POCT GLUCOSE (AUTOMATED) 2022 21:41:00 Terminella, Efrain U

niversity of 

[facility]

 

                POCT GLUCOSE (AUTOMATED) 2022 21:41:00 Terminella, Efrain U

niversity of 

[facility]

 

                POCT GLUCOSE (AUTOMATED) 2022 17:07:00 Terminella, Efrain U

niversity of 

[facility]

 

                POCT GLUCOSE (AUTOMATED) 2022 17:07:00 Terminella, Efrain U

niversity of 

[facility]

 

                POCT GLUCOSE (AUTOMATED) 2022 14:01:00 Terminella, Efrain U

niversity of 

[facility]

 

                POCT GLUCOSE (AUTOMATED) 2022 14:01:00 Terminella, Efrain U

niversity of 

[facility]

 

                POCT GLUCOSE (AUTOMATED) 2022 09:18:00 Terminella, Efrain U

niversity of 

[facility]

 

                POCT GLUCOSE (AUTOMATED) 2022 09:18:00 Terminella, Efrain U

niversity of 

[facility]

 

                POCT GLUCOSE (AUTOMATED) 2022 01:43:00 Terminella, Efrain U

niversity of 

[facility]

 

                POCT GLUCOSE (AUTOMATED) 2022 01:43:00 Terminella, Efrain U

niversity of 

[facility]

 

                POCT GLUCOSE (AUTOMATED) 2022 21:24:00 AlbdamianamiTobias Uni

versity of [facility]

 

                POCT GLUCOSE (AUTOMATED) 2022 21:24:00 AlbdamianamiTobias Uni

versity of [facility]

 

                POCT GLUCOSE (AUTOMATED) 2022 16:25:00 AlbustamiTobias Uni

versity of [facility]

 

                POCT GLUCOSE (AUTOMATED) 2022 16:25:00 AlbTobias gupta Uni

versity of [facility]

 

                URINALYSIS      2022 15:46:00 Phillips, [facility]

 

                URINE CULTURE   2022 15:46:00 Phillips, [facility]

 

                URINALYSIS      2022 15:46:00 Phillips, [facility]

 

                URINE CULTURE   2022 15:46:00 Phillips, [facility]

 

                POCT GLUCOSE (AUTOMATED) 2022 15:36:00 AlbTobias gupta Uni

versity of [facility]

 

                POCT GLUCOSE (AUTOMATED) 2022 15:36:00 AlbdamianamiTobias Uni

versity of [facility]

 

                POCT GLUCOSE (AUTOMATED) 2022 14:25:00 AlbdamianamiTobias Uni

versity of [facility]

 

                POCT GLUCOSE (AUTOMATED) 2022 14:25:00 AlbTobias gupta Uni

versity [facility]

 

                BASIC METABOLIC PANEL (NA, 2022 13:45:00 Albustami, Tobias U

niversity of 

[place]



                K, CL, CO2, GLUCOSE, BUN,                                 Medica

l Branch



                CREATININE, CA)                                 

 

                BASIC METABOLIC PANEL (NA, 2022 13:45:00 Albustami, Tobias U

niversity of 

[place]



                K, CL, CO2, GLUCOSE, BUN,                                 Medica

l Branch



                CREATININE, CA)                                 

 

                POCT GLUCOSE (AUTOMATED) 2022 13:34:00 Albustami, Tobias Uni

versity [facility]

 

                POCT GLUCOSE (AUTOMATED) 2022 13:34:00 Albustami, Tobias Uni

[facility]

 

                CRITICAL CARE   2022 13:05:05 [facility]

 

                CRITICAL CARE   2022 13:05:05 [facility]

 

                POCT GLUCOSE (AUTOMATED) 2022 12:23:00 AlbustamiTobias Uni

versity [facility]

 

                POCT GLUCOSE (AUTOMATED) 2022 12:23:00 Albustami, Tobias Uni

versity [facility]

 

                POCT GLUCOSE (AUTOMATED) 2022 11:24:00 Albustami, Tobias Uni

versity of [facility]

 

                POCT GLUCOSE (AUTOMATED) 2022 11:24:00 Albustami, Tobias Uni

versity [facility]

 

                POCT GLUCOSE (AUTOMATED) 2022 10:33:00 Albustami, Tobias Uni

versity of [facility]

 

                POCT GLUCOSE (AUTOMATED) 2022 10:33:00 Albustami, Tobias Uni

versity [facility]

 

                BASIC METABOLIC PANEL (NA, 2022 09:32:00 Albustami, Tobias U

niversity of 

[place]



                K, CL, CO2, GLUCOSE, BUN,                                 Medica

l Branch



                CREATININE, CA)                                 

 

                BASIC METABOLIC PANEL (NA, 2022 09:32:00 Albustami, Tobias U

niversity of 

[place]



                K, CL, CO2, GLUCOSE, BUN,                                 Medica

l Branch



                CREATININE, CA)                                 

 

                POCT GLUCOSE (AUTOMATED) 2022 09:30:00 Albustami, Tobias Uni

versity [facility]

 

                POCT GLUCOSE (AUTOMATED) 2022 09:30:00 Albustami, Tobias Uni

versity of [place]



                                                                Medical Branch

 

                POCT GLUCOSE (AUTOMATED) 2022 08:27:00 Albustami, Tobias Uni

[facility]ity of [place]



                                                                Medical Branch

 

                POCT GLUCOSE (AUTOMATED) 2022 08:27:00 Albdamianami, Tobias Uni

[facility] of [place]



                                                                Medical Branch

 

                POCT GLUCOSE (AUTOMATED) 2022 07:23:00 Albustami, Tobias Uni

[facility]ity of [place]



                                                                Medical Branch

 

                POCT GLUCOSE (AUTOMATED) 2022 07:23:00 Albustami, Tobias Uni

versity of [facility]

 

                POCT GLUCOSE (AUTOMATED) 2022 06:22:00 Albustami, Tobias Uni

versity of [facility]

 

                POCT GLUCOSE (AUTOMATED) 2022 06:22:00 AlbdamianamiTobias Uni

versity of [facility]

 

                BASIC METABOLIC PANEL (NA, 2022 05:30:00 Albustami, Tobias U

niversity of 

[place]



                K, CL, CO2, GLUCOSE, BUN,                                 Medica

l Branch



                CREATININE, CA)                                 

 

                BASIC METABOLIC PANEL (NA, 2022 05:30:00 Albustami, Tobias U

niversity of 

[place]



                K, CL, CO2, GLUCOSE, BUN,                                 Medica

l Branch



                CREATININE, CA)                                 

 

                POCT GLUCOSE (AUTOMATED) 2022 05:27:00 AlbTobias gupta Uni

versity of [facility]

 

                POCT GLUCOSE (AUTOMATED) 2022 05:27:00 AlbustamiTobias Uni

versity of [place]



                                                                Medical Branch

 

                POCT GLUCOSE (AUTOMATED) 2022 04:23:00 Albustami, Tobias Uni

[facility]ity of [place]



                                                                Medical Branch

 

                POCT GLUCOSE (AUTOMATED) 2022 04:23:00 AlbustamiAdamsar Uni

versity of [facility]

 

                POCT GLUCOSE (AUTOMATED) 2022 03:19:00 Albustami, Tobias Uni

versity of [place]



                                                                Medical Branch

 

                POCT GLUCOSE (AUTOMATED) 2022 03:19:00 Albustami, Tobias Uni

versity of [place]



                                                                Medical Branch

 

                POCT GLUCOSE (AUTOMATED) 2022 02:24:00 AlbTobias gupta Uni

versity [facility]

 

                POCT GLUCOSE (AUTOMATED) 2022 02:24:00 AlbTobias gupta Uni

[facility]

 

                KETONES URINE   2022 01:49:00 Albcandy [facility]

 

                KETONES URINE   2022 01:49:00 AlbRUST, [facility]

 

                BETA HYDROXY-BUTYRATE 2022 01:44:00 Phillips, [facility]

[facility]

 

                BASIC METABOLIC PANEL (NA, 2022 01:44:00 Albustami, Tobias U

niversity of 

[place]



                K, CL, CO2, GLUCOSE, BUN,                                 Medica

l Branch



                CREATININE, CA)                                 

 

                BETA HYDROXY-BUTYRATE 2022 01:44:00 [facility], [facility]

 

                BASIC METABOLIC PANEL (NA, 2022 01:44:00 Albustami, Tobias U

niversity of 

[place]



                K, CL, CO2, GLUCOSE, BUN,                                 Medica

l Branch



                CREATININE, CA)                                 

 

                POCT GLUCOSE (AUTOMATED) 2022 01:24:00 AlbustamiTobias Uni

versity [facility]

 

                POCT GLUCOSE (AUTOMATED) 2022 01:24:00 Albustami, Tobias Uni

versity of [facility]

 

                POCT GLUCOSE (AUTOMATED) 2022 00:18:00 AlbustamiTobias Uni

versity of [facility]

 

                POCT GLUCOSE (AUTOMATED) 2022 00:18:00 Albustami, Tobias Uni

versity of [facility]

 

                POCT GLUCOSE (AUTOMATED) 2022 22:56:00 Albustami, Tobias Uni

versity of [facility]

 

                POCT GLUCOSE (AUTOMATED) 2022 22:56:00 Albustami, Tobias Uni

versity [facility]

 

                BASIC METABOLIC PANEL (NA, 2022 22:03:00 Albustami, Tobias U

niversity of 

[place]



                K, CL, CO2, GLUCOSE, BUN,                                 Medica

l Branch



                CREATININE, CA)                                 

 

                BASIC METABOLIC PANEL (NA, 2022 22:03:00 Albustami, Tobias U

niversity of 

[place]



                K, CL, CO2, GLUCOSE, BUN,                                 Medica

l Branch



                CREATININE, CA)                                 

 

                POCT GLUCOSE (AUTOMATED) 2022 21:50:00 Albustami, Tobias Uni

versity of [place]



                                                                Medical Branch

 

                POCT GLUCOSE (AUTOMATED) 2022 21:50:00 Albustami, Tobias Uni

versity of [facility]

 

                POCT GLUCOSE (AUTOMATED) 2022 20:39:00 Albustami, Tobias Uni

versity of [facility]

 

                POCT GLUCOSE (AUTOMATED) 2022 20:39:00 Albustami, Tobias Uni

versity of [facility]

 

                POCT GLUCOSE (AUTOMATED) 2022 18:58:00 Albustami, Tobias Uni

versity of [facility]

 

                POCT GLUCOSE (AUTOMATED) 2022 18:58:00 Albustami, Tobias Uni

versity of [facility]

 

                BASIC METABOLIC PANEL (NA, 2022 17:46:00 Albustami, Tobias U

niversity of 

[place]



                K, CL, CO2, GLUCOSE, BUN,                                 Medica

l Branch



                CREATININE, CA)                                 

 

                BASIC METABOLIC PANEL (NA, 2022 17:46:00 Albustami, Tobias U

niversity of 

[place]



                K, CL, CO2, GLUCOSE, BUN,                                 Medica

l Branch



                CREATININE, CA)                                 

 

                POCT GLUCOSE (AUTOMATED) 2022 17:39:00 Albustami, Tobias Uni

versity of [facility]

 

                POCT GLUCOSE (AUTOMATED) 2022 17:39:00 Albustami, Tobias Uni

versity of [facility]

 

                POCT GLUCOSE (AUTOMATED) 2022 16:22:00 Albustami, Tobias Uni

versity of [facility]

 

                POCT GLUCOSE (AUTOMATED) 2022 16:22:00 Albustami, Tobias Uni

versity of [place]



                                                                Medical Branch

 

                POCT GLUCOSE (AUTOMATED) 2022 15:27:00 Albustami, Tobias Uni

versity of [facility]

 

                POCT GLUCOSE (AUTOMATED) 2022 15:27:00 Albustami, Tobias Uni

versity of [facility]

 

                POCT GLUCOSE (AUTOMATED) 2022 14:17:00 Albustami, Tobias [facility]

 

                POCT GLUCOSE (AUTOMATED) 2022 14:17:00 Albcandy [facility]

 

                CBC WITHOUT DIFF 2022 13:33:00 Albusttrupti [facility]

 

                CBC WITHOUT DIFF 2022 13:33:00 Albcandy [facility]

 

                POCT GLUCOSE (AUTOMATED) 2022 13:20:00 Albustami [facility]

 

                POCT GLUCOSE (AUTOMATED) 2022 13:20:00 Albustami [facility]

 

                POCT GLUCOSE (AUTOMATED) 2022 12:13:00 Albusttrupti [facility]

 

                POCT GLUCOSE (AUTOMATED) 2022 12:13:00 Albcandy [facility]

 

                XR CHEST 1 VW   2022 11:46:06 Albcandy [facility]

 

                XR CHEST 1 VW   2022 11:46:06 Albcandy [facility]

 

                MAGNESIUM       2022 10:54:00 [facility]

 

                MRSA / MSSA SCREEN BY PCR, 2022 10:54:00 Tobias Hernandez [facility]

 

                BASIC METABOLIC PANEL (NA, 2022 10:54:00 Tobias Hernandez [facility]



                K, CL, CO2, GLUCOSE, BUN,                                 Medica

l Branch



                CREATININE, CA)                                 

 

                OSMOLALITY, SERUM OR 2022 10:54:00 Tobias Hernandez [facility]



                PLASMA                                          [facility]

 

                PHOSPHORUS      2022 10:54:00 Yong [facility]

 

                BETA HYDROXY-BUTYRATE 2022 10:54:00 Albcandy Tobias [facility]

 

                PHOSPHORUS      2022 10:54:00 Albcandy [facility]

 

                MAGNESIUM       2022 10:54:00 Albcandy [facility]

 

                OSMOLALITY, SERUM OR 2022 10:54:00 Tobias Hernandez [facility]



                PLASMA                                          [facility]

 

                BETA HYDROXY-BUTYRATE 2022 10:54:00 Tobias Hernandez [facility]

[facility]

 

                BASIC METABOLIC PANEL (NA, 2022 10:54:00 Tobias Hernandez U

[facility]



                K, CL, CO2, GLUCOSE, BUN,                                 Medica

l Branch



                CREATININE, CA)                                 

 

                MRSA / MSSA SCREEN BY PCR, 2022 10:54:00 Tobias Hernandez U

[facility]

 

                POCT GLUCOSE (AUTOMATED) 2022 10:53:00 Tobias Hernandez Uni

[facility]

 

                POCT GLUCOSE (AUTOMATED) 2022 10:53:00 Tobias Hernandez Uni

versity of [facility]

 

                POCT GLUCOSE (AUTOMATED) 2022 08:46:00 Radha Fofana

versity of [facility]

 

                POCT GLUCOSE (AUTOMATED) 2022 08:46:00 Radha Fofana

versity of [facility]

 

                POCT GLUCOSE (AUTOMATED) 2022 07:39:00 Radha Fofana

versity of [facility]

 

                POCT GLUCOSE (AUTOMATED) 2022 07:39:00 Radha Fofana

versity of [facility]

 

                POCT GLUCOSE (AUTOMATED) 2022 07:02:00 Radha Fofana

versity of [facility]

 

                POCT GLUCOSE (AUTOMATED) 2022 07:02:00 Radha Fofana

versity of [facility]

 

                POCT GLUCOSE (AUTOMATED) 2022 06:17:00 Radha Fofana

versity of [facility]

 

                POCT GLUCOSE (AUTOMATED) 2022 06:17:00 Radha Fofana

versity of [facility]

 

                AC PANEL 21 + LACTIC ACID 2022 05:36:00 Radha Fofana

iversity [facility]

 

                AC PANEL 21 + LACTIC ACID 2022 05:36:00 Radha Fofana

iversity [facility]

 

                POCT GLUCOSE (AUTOMATED) 2022 04:58:00 Radha Fofana

[facility]

 

                POCT GLUCOSE (AUTOMATED) 2022 04:58:00 Radha Fofana [facility]

 

                CT ABDOMEN PELVIS W 2022 04:33:00 Radha Fofana [facility]



                CONTRAST                                        [facility]

 

                CT ABDOMEN PELVIS W 2022 04:33:00 Radha Fofana [facility]



                CONTRAST                                        [facility]

 

                COMP. METABOLIC PANEL 2022 04:20:00 Radha Fofana [facility]



                ([id number])                                         [facility]

 

                COMP. METABOLIC PANEL 2022 04:20:00 Radha Fofana [facility]



                ([id number])                                         [facility]

 

                CBC WITH DIFF   2022 03:14:00 Radha Fofana [facility]

 

                BLOOD CULTURE SCREEN 2022 03:14:00 Radha Fofana [facility]

 

                BLOOD CULTURE SCREEN 2022 03:14:00 Radha Fofana [facility]

 

                CBC WITH DIFF   2022 03:14:00 Radha Fofana [facility]

 

                ACTIVATED PARTIAL THRMPLAS 2022 03:13:00 [facility]

 

                PROTHROMBIN TIME / INR 2022 03:13:00 [facility]

[facility]

 

                LIPASE          2022 03:13:00 Radha Fofana [facility]

 

                TROPONIN I      2022 03:13:00 Radha Fofana [facility]

 

                N-TERMINAL PRO-BNP 2022 03:13:00 Radha Fofana [facility]

 

                LIPASE          2022 03:13:00 Radha Fofana [facility]

 

                TROPONIN I      2022 03:13:00 Radha Fofana [facility]

 

                PROTHROMBIN TIME / INR 2022 03:13:00 [facility]

[facility]

 

                ACTIVATED PARTIAL THRMPLAS 2022 03:13:00 Radha Fofana

[facility]

 

                N-TERMINAL PRO-BNP 2022 03:13:00 Radha Fofana [facility]

 

                LACTIC ACID WHOLE BLOOD 2022 03:12:00 Radha Fofana [facility]

 

                LACTIC ACID WHOLE BLOOD 2022 03:12:00 Radha Fofana [facility]

 

                EKG-12 LEAD     2022 01:55:16 Doctor Unassigned, [facility] Name         [facility]

 

                EKG-12 LEAD     2022 01:55:16 Doctor Unassigned, [facility] Name         [facility]

 

                EMERGENCY DEPARTMENT 2022 05:01:00 Doctor Unassigned, [facility]



                DOCUMENTS                       [place]         [facility]

 

                HOSPITAL ADMISSION 2022 05:01:00 Doctor Unassigned, [facility]

 

                BASIC METABOLIC PANEL (NA, 2022 09:31:00 [facility]



                K, CL, CO2, GLUCOSE, BUN,                                 Medica

l Branch



                CREATININE, CA)                                 

 

                CBC WITH DIFF   2022 09:31:00 [facility]

 

                BASIC METABOLIC PANEL (NA, 2022 09:31:00 [facility] [address]



                K, CL, CO2, GLUCOSE, BUN,                                 Medica

l Branch



                CREATININE, CA)                                 

 

                CBC WITH DIFF   2022 09:31:00 [facility]

 

                BASIC METABOLIC PANEL (NA, 2022 08:57:00 [facility] [address]



                K, CL, CO2, GLUCOSE, BUN,                                 Medica

l Branch



                CREATININE, CA)                                 

 

                CBC WITH DIFF   2022 08:57:00 [facility]

 

                BASIC METABOLIC PANEL (NA, 2022 08:57:00 Eveline [facility]



                K, CL, CO2, GLUCOSE, BUN,                                 Medica

l Branch



                CREATININE, CA)                                 

 

                CBC WITH DIFF   2022 08:57:00 [facility]

 

                CBC WITH DIFF   2022 10:55:00 Rand Dong [facility]

 

                COMP. METABOLIC PANEL 2022 10:55:00 Rand Dong  [facility]



                ([id number])                                         [facility]

 

                N-TERMINAL PRO-BNP 2022 10:55:00 Rand Dong  [facility]

 

                COMP. METABOLIC PANEL 2022 10:55:00 Letitia reymundo  [facility]



                ([id number])                                         [facility]

 

                CBC WITH DIFF   2022 10:55:00 Rand Dong  [facility]

 

                N-TERMINAL PRO-BNP 2022 10:55:00 Rand Dong  [facility]

 

                CBC WITH DIFF   2022 11:33:00 Ashly Lees [facility]

 

                COMP. METABOLIC PANEL 2022 11:33:00 Letitia reymundo  [facility]



                ([id number])                                         [facility]

 

                N-TERMINAL PRO-BNP 2022 11:33:00 Letitia reymundo  [facility]

 

                MAGNESIUM       2022 11:33:00 Rand Dong  [facility]

 

                MAGNESIUM       2022 11:33:00 Letitia reymundo  [facility]

 

                COMP. METABOLIC PANEL 2022 11:33:00 Rand Dong  [facility]



                ([id number])                                         [facility]

 

                CBC WITH DIFF   2022 11:33:00 Ashly Lees [facility]

 

                N-TERMINAL PRO-BNP 2022 11:33:00 Rand Dong  [facility]

 

                ASPIRATE OR ABSCESS 2022 21:31:00 Coleen Cerna   [facility] [place]



                CULTURE(AEROBIC/ANAEROBIC)                                 Medic

al Branch

 

                ASPIRATE OR ABSCESS 2022 21:31:00 Coleen Cerna   [facility] [place]



                CULTURE(AEROBIC/ANAEROBIC)                                 Medic

al Branch

 

                PROTEIN CREAT RATIO URINE 2022 11:11:00 Rand Dong  [facility]                                          Medical Branch

 

                SODIUM, URINE RANDOM 2022 11:11:00 Rand Dong  [facility]

 

                SODIUM, URINE RANDOM 2022 11:11:00 Rand Dong  [facility]

 

                PROTEIN CREAT RATIO URINE 2022 11:11:00 [facility]



                RANDOM                                          Medical [facility]

 

                XR CHEST 1 VW   2022 11:07:43 Rand Dong  [facility]

 

                XR FOOT 3+ VW LEFT 2022 11:07:43 Rand Dong  [facility]

 

                XR CHEST 1 VW   2022 11:07:43 Letitia reymundo  [facility]

 

                XR FOOT 3+ VW LEFT 2022 11:07:43 Rand Dong  [facility]

 

                URINE DRUG (IMMUNOASSAY) - 2022 11:07:00 Rand Dong

[facility]



                COMPREHENSIVE DRUG SCREEN                                 [facility]

 

                URINE DRUG (LCMSMS) - 2022 11:07:00 Rand Dong  [facility]



                OPIATES PANEL                                   [facility]

 

                URINE DRUG (IMMUNOASSAY) - 2022 11:07:00 Rand Dong

[facility]



                COMPREHENSIVE DRUG SCREEN                                 [facility]

 

                URINE DRUG (LCMSMS) - 2022 11:07:00 Rand Dong  [facility]



                SYNTHETIC OPIATES PANEL                                 [facility] 

Branch

 

                SEDIMENTATION RATE 2022 11:03:00 Rand Dong  [facility]

 

                PROCALCITONIN   2022 11:03:00 Rand Dong  [facility]

 

                CBC WITH DIFF   2022 11:03:00 Letitia reymundo  [facility]

 

                COMP. METABOLIC PANEL 2022 11:03:00 Rand Dong  [facility]



                ([id number])                                         [facility]

 

                N-TERMINAL PRO-BNP 2022 11:03:00 Rand Dong  [facility]

 

                MAGNESIUM       2022 11:03:00 Letitia reymundo  [facility]

 

                PHOSPHORUS      2022 11:03:00 Letitia [facility]

 

                CREATINE KINASE 2022 11:03:00 Letitia [facility]

 

                VITAMIN D, 25-OH 2022 11:03:00 Letitia [facility]

 

                VITAMIN B12, LEVEL 2022 11:03:00 Letitia [facility]

 

                GLYCOSYLATED HEMOGLOBIN 2022 11:03:00 Letitia Adnan  [facility]

[facility] [place]



                ([facility])                                           [facility]

 

                PHOSPHORUS      2022 11:03:00 Letitia [facility]

 

                CREATINE KINASE 2022 11:03:00 Letitia [facility]

 

                MAGNESIUM       2022 11:03:00 Letitia [facility]

 

                VITAMIN B12, LEVEL 2022 11:03:00 Letitia [facility]

 

                COMP. METABOLIC PANEL 2022 11:03:00 Letitia reymundo  [facility]



                ([id number])                                         [facility]

 

                SEDIMENTATION RATE 2022 11:03:00 Letitia [facility]

 

                CBC WITH DIFF   2022 11:03:00 Letitia [facility]

 

                GLYCOSYLATED HEMOGLOBIN 2022 11:03:00 Letitia Adnan  [facility]

[facility] [place]



                ([facility])                                           [facility]

 

                N-TERMINAL PRO-BNP 2022 11:03:00 Letitia [facility]

 

                VITAMIN D, 25-OH 2022 11:03:00 Letitia [facility]

 

                PROCALCITONIN   2022 11:03:00 Letitia [facility]

 

                CT ANGIOGRAM LOWER 2022 03:39:00 Radha Fofana [facility]



                EXTREMITY LEFT W CONTRAST                                 Medica

l Branch

 

                CT ANGIOGRAM LOWER 2022 03:39:00 Radha Fofana [facility]



                EXTREMITY LEFT W CONTRAST                                 Medica

l Branch

 

                CT HEAD WO CONTRAST 2022 03:25:00 Radha Fofana [facility]

 

                CT HEAD WO CONTRAST 2022 03:25:00 Radha Fofana [facility]

 

                URINALYSIS      2022 01:22:00 Radha Fofana [facility]

 

                URINE CULTURE   2022 01:22:00 Radha Fofana [facility]

 

                URINALYSIS      2022 01:22:00 Radha Fofana [facility]

 

                URINE CULTURE   2022 01:22:00 Radha Fofana [facility]

 

                CBC WITH DIFF   2022 00:58:00 Radha Fofana [facility]

 

                ACTIVATED PARTIAL THRMPLAS 2022 00:58:00 Radha Fofana [facility]

 

                PROTHROMBIN TIME / INR 2022 00:58:00 Radha Fofana [facility]

 

                COMP. METABOLIC PANEL 2022 00:58:00 Radha Fofana [facility]



                ([id number])                                         [facility]

 

                BLOOD CULTURE SCREEN 2022 00:58:00 Radha Fofana [facility]

 

                LACTIC ACID WHOLE BLOOD 2022 00:58:00 Radha Fofana [facility]

 

                N-TERMINAL PRO-BNP 2022 00:58:00 Rand Dong  [facility]

 

                MAGNESIUM       2022 00:58:00 Letitia reymundo  [facility]

 

                PHOSPHORUS      2022 00:58:00 Letitia reymundo  [facility]

 

                FERRITIN SERUM  2022 00:58:00 Letitia [facility]

 

                IRON PANEL      2022 00:58:00 Letitia [facility]

 

                THYROID STIMULATING 2022 00:58:00 Rand Dong  [facility]



                HORMONE                                         [facility]

 

                LIPID PANEL ([id number])(TOTAL 2022 00:58:00 Rand Dong  [facility]



                [facility]



                TRIGLYCERIDES, HDL)                                 

 

                BLOOD CULTURE SCREEN 2022 00:58:00 Radha Fofana [facility]

 

                PHOSPHORUS      2022 00:58:00 Letitia [facility]

 

                MAGNESIUM       2022 00:58:00 Letitia [facility]

 

                FERRITIN SERUM  2022 00:58:00 Letitia [facility]

 

                THYROID STIMULATING 2022 00:58:00 Rand Dong  [facility]



                HORMONE                                         [facility]

 

                COMP. METABOLIC PANEL 2022 00:58:00 Radha Fofana [facility]



                ([id number])                                         [facility]

 

                LIPID PANEL (48540)(TOTAL 2022 00:58:00 Rand Dong  [facility]



                CHOLESTEROL,                                    [facility]



                TRIGLYCERIDES, HDL)                                 

 

                IRON PANEL      2022 00:58:00 Letitia [facility]

 

                CBC WITH DIFF   2022 00:58:00 Radha Fofana [facility]

 

                PROTHROMBIN TIME / INR 2022 00:58:00 Radha Fofana [facility]

 

                ACTIVATED PARTIAL THRMPLAS 2022 00:58:00 Radha Fofana [facility]

 

                N-TERMINAL PRO-BNP 2022 00:58:00 Rand Dong  [facility]

 

                LACTIC ACID WHOLE BLOOD 2022 00:58:00 Radha Fofana [facility]

 

                EMERGENCY DEPARTMENT 2022-05-10 05:01:00 Doctor Derrell, [facility]



                DOCUMENTS                       [place]         [facility]

 

                HOSPITAL ADM - [facility] 2022-05-10 05:01:00 Doctor Derrell [facility]

[facility] [place]



                                                [place]         [facility]

 

                HOSPITAL ADMISSION 2022-05-10 05:01:00 Doctor Unassigned, [facility]

[facility] [place]



                                                [place]         [facility]

 

                EMERGENCY DEPARTMENT 2022-05-10 05:01:00 Doctor Derrell, [facility]



                DOCUMENTS                       [place]         [facility]

 

                HOSPITAL ADM - MISC 2022-05-10 05:01:00 Doctor Derrell [facility]

[facility] [place]



                                                [place]         [facility]

 

                Spinal puncture, lumbar, 2022 20:30:00                 Patience Garcia



                diagnostic; with                                 



                fluoroscopic or CT                                 



                guidance                                        

 

                MAGNESIUM       2022 04:12:00 [facility]

 

                COMP. METABOLIC PANEL 2022 04:12:00 [facility]



                ([id number])                                         [facility]

 

                CBC WITH DIFF   2022 04:12:00 [facility]

 

                CT HEAD WO CONTRAST 2022 00:15:29 ALLISON Romeo [facility]

 

                URINALYSIS      2022 23:53:00 ALLISON Romeo Elena [facility]

 

                COMP. METABOLIC PANEL 2022 23:52:00 ALLISON Romeo Elena [facility]

[facility] [place]



                ([id number])                                         [facility]

 

                CBC WITH DIFF   2022 23:52:00 ALLISON Romeo [facility]

 

                XR CHEST 1 VW   2022 03:03:32 Rand Dong  [facility]

 

                COMP. METABOLIC PANEL 2022 11:57:00 [facility]



                ([id number])                                         [facility]

 

                CBC WITH DIFF   2022 11:57:00 [facility]

 

                BASIC METABOLIC PANEL (NA, 2022 12:10:00 Edionwe, [facility]  [facility]



                K, CL, CO2, GLUCOSE, BUN,                                 Medica

l Branch



                CREATININE, CA)                                 

 

                CBC WITH DIFF   2022 12:09:00 Loe [facility]

 

                URINALYSIS      2022 00:40:00 Suly Luna [facility]

 

                URINE CULTURE   2022 00:40:00 Suly Luna [facility]

 

                FECES CULTURE   2022 14:58:00 Leo [facility]

 

                OCCULT (GUAIAC) BLOOD 2022 [facility]:58[facility]00 [facility]

 

                CLOSTRIDIUM DIFFICILE 2022 [facility]58:00 [facility]



                TOXIN                                           [facility]

 

                FECAL PATHOGENS BY PCR 2022 14:58:00 [facility]

[facility]

 

                URINE DRUG (IMMUNOASSAY) - 2022 10:11:00 Rachel Bailey

[facility]



                COMPREHENSIVE DRUG SCREEN                                 Medica

l Branch

 

                COVID-19 (ID NOW RAPID 2022 07:33:00 Ashley Garay [facility]



                TESTING)                                        [facility]

 

                LAB ONLY COVID  2022 07:33:00 Ashley Garay [facility]



                INTERPRETATION                                  [facility]

 

                COMP. METABOLIC PANEL 2022 06:18:00 Ashley Garay [facility]



                ([id number])                                         [facility]

 

                CBC WITH DIFF   2022 05:40:00 Ashley Garay [facility]

 

                URINALYSIS      2022 01:43:00 Alma Villaseñor [facility]

 

                LIPASE          2022 01:40:00 Alma Villaseñor [facility]

 

                COMP. METABOLIC PANEL 2022 01:40:00 Alma Villaseñor [facility]



                ([id number])                                         [facility]

 

                CBC WITH DIFF   2022 01:38:00 Alma Villaseñor [facility]

 

                XR CHEST 1 VW   2022 23:40:19 Alma Villaseñor [facility]

 

                ASSIGNMENT OF BENEFITS 2022 22:05:40 Doctor Unassigned, [facility]

 

                NOTICE OF PRIVACY 2022 22:04:58 Doctor Patricioigned, [facility]



                PRACTICES                       [place]         [facility]

 

                CONSENT/REFUSAL FOR 2022 22:04:33 Doctor Derrell, [facility]



                DIAGNOSIS AND TREATMENT                 [facility]

 

                URINALYSIS      2022-01-15 23:09:00 Neeta Maria [facility]

 

                BLOOD CULTURE SCREEN 2022-01-15 23:04:00 Neeta Maria [facility]

 

                D-DIMER         2022-01-15 22:49:00 Neeta Maria [facility]

 

                COVID-19 (ID NOW RAPID 2022-01-15 22:48:00 Neeta Maria [facility]

[facility] [place]



                TESTING)                                        [facility]

 

                COMP. METABOLIC PANEL 2022-01-15 22:17:00 Neeta Maria [facility]

[facility] [place]



                ([id number])                                         [facility]

 

                CBC WITH DIFF   2022-01-15 22:17:00 Neeta Maria [facility]

 

                XR CHEST 1 VW   2022-01-15 21:47:19 Neeta Maria [facility]

 

                COMP. METABOLIC PANEL 2021 22:20:00 Chris Sol [facility]

[facility] [place]



                ([id number])                                         [facility]

 

                CBC WITH DIFF   2021 22:20:00 Singer, Chris [place] [facility]

[facility]

 

                CT ABDOMEN PELVIS WO 2021-05-10 00:39:40 Acacia Villa [facility]

[facility] [place]



                CONTRAST                                        [facility]

 

                LIPASE          2021-05-10 00:06:00 Acacia Villa [facility]

 

                COMP. METABOLIC PANEL 2021-05-10 00:06:00 Acacia Villa Un

[facility]



                ([id number])                                         Medical [facility]

 

                CBC WITH DIFF   2021-05-10 00:06:00 Acacia Villa F [facility]

 

                URINALYSIS      2021-05-10 00:00:00 Soni Villao F [facility]

 

                COVID-19 (ID NOW RAPID 2021-05-10 00:00:00 Acacia Villa U

[facility]



                TESTING)                                        Medical [facility]

 

                Cholecystectomy                                 [facility]

 

                Shunt of cerebral                                 [facility]

nn



                ventricle to extracranial                                 



                site                                            







Plan of Care







             Planned Activity Planned Date Details      Comments     Source

 

             Future Scheduled 2023-01-10   Lipid panel               [facility]

s



             Test         00:00:00     (procedure) [code =              Medical 

Center



                                       91109052]                 

 

             Future Scheduled 2023-01-10   Lipid panel               [facility]

s



             Test         00:00:00     (procedure) [code =              Medical 

Center



                                       43627480]                 

 

             Future Scheduled 2022   INFLUENZA VACCINE (#1)              C

[facility]



             Test         00:00:00     [code = INFLUENZA              Medical Ce

nter



                                       VACCINE (#1)]              

 

             Future Scheduled 2022   DEPRESSION SCREENING              [facility]



             Test         00:00:00     (12+) [code =              Medical Center



                                       DEPRESSION SCREENING              



                                       (12+)]                    

 

             Future Scheduled 2021   INFLUENZA VACCINE              [facility]



             Test         00:00:00     (Season Ended) [code =              Medic

al Center



                                       INFLUENZA VACCINE              



                                       (Season Ended)]              

 

             Future Scheduled 2021   DEPRESSION SCREENING              [facility]



             Test         00:00:00     (12+) [code =              Medical Center



                                       DEPRESSION SCREENING              



                                       (12+)]                    

 

             Future Scheduled 2020-04-10   Hemoglobin A1c              [facility]

kes



             Test         00:00:00     measurement               Medical Center



                                       (procedure) [code =              



                                       23453965]                 

 

             Future Scheduled 2020-04-10   Hemoglobin A1c              [facility]

[facility]



             Test         00:00:00     measurement               Medical Center



                                       (procedure) [code =              



                                       32805565]                 

 

             Future Scheduled 2004   DTAP/TDAP/TD VACCINES              [facility]



             Test         00:00:00     (1 - Tdap) [code =              Medical C

enter



                                       DTAP/TDAP/TD VACCINES              



                                       (1 - Tdap)]               

 

             Future Scheduled 2004   DTAP/TDAP/TD VACCINES              [facility]



             Test         00:00:00     (1 - Tdap) [code =              Medical C

enter



                                       DTAP/TDAP/TD VACCINES              



                                       (1 - Tdap)]               

 

             Future Scheduled 2003   HEPATITIS C SCREENING              [facility]



             Test         00:00:00     [code = HEPATITIS C              Medical 

Center



                                       SCREENING]                

 

             Future Scheduled 2003   HEPATITIS C SCREENING              [facility]



             Test         00:00:00     [code = HEPATITIS C              Medical 

Center



                                       SCREENING]                

 

             Future Scheduled 1997   COVID-19 VACCINE (1)              [facility]



             Test         00:00:00     [code = COVID-19              Medical Abilio

ter



                                       VACCINE (1)]              

 

             Future Scheduled 1995   DIABETIC EYE EXAM              [facility]



             Test         00:00:00     [code = DIABETIC EYE              Medical

 Center



                                       EXAM]                     

 

             Future Scheduled 1995   Urine screening for              [facility]



             Test         00:00:00     protein (procedure)              Medical 

Center



                                       [code = 420928582]              

 

             Future Scheduled 1995   DIABETIC EYE EXAM              [facility]



             Test         00:00:00     [code = DIABETIC EYE              Medical

 Center



                                       EXAM]                     

 

             Future Scheduled 1995   Urine screening for              [facility]



             Test         00:00:00     protein (procedure)              Medical 

Center



                                       [code = 224479496]              

 

             Future Scheduled 1991   PNEUMOCOCCAL VACCINE              [facility]



             Test         00:00:00     0-64 YRS (1 of 1 -              Medical C

enter



                                       PPSV23) [code =              



                                       PNEUMOCOCCAL VACCINE              



                                       0-64 YRS (1 of 1 -              



                                       PPSV23)]                  

 

             Future Scheduled 1991   PNEUMOCOCCAL VACCINE              [facility]



             Test         00:00:00     0-64 YRS (1 - PCV)              Medical C

enter



                                       [code = PNEUMOCOCCAL              



                                       VACCINE 0-64 YRS (1 -              



                                       PCV)]                     

 

             Future Scheduled 1986   COVID-19 VACCINE (#1)              [facility]



             Test         00:00:00     [code = COVID-19              Medical Abilio

ter



                                       VACCINE (#1)]              







Encounters







        Start   End     Encounter Admission Attending Care    Care    Encounter 

Source



        Date/Time Date/Time Type    Type    Clinicians Facility Department ID   

   

 

        2022         Outpatient                 [facility]     -[id number]

 [place]



        08:22:27                                                 [id number]    [facility]

 

        2022         Outpatient                 [facility]     -[id number]

 [place]



        11:10:01                                                 [id number]    [facility]

 

        2022         Outpatient         Jesusita,  STLMLC  STLMLC  [id number]

 Common



        13:45:16                         Domingo                  [id number]   [facility]

 

        2022         Outpatient         Jesusita,  STLMLC  STLMLC  [id number]

 Common



        13:17:39                         Domingo                  [id number]   [facility]

 

        2022         Outpatient         Jesusita,  STLMLC  STLMLC  [id number]

 Common



        13:16:34                         Domingo                  [id number]   [facility]

 

        2022 Outpatient R       MARY ANNE RAMIREZ [facility]    [id number]

-20 Univers



        13:00:00 13:00:00                 MARY ANNE RAMIREZ                 [id number]  [facility]

 

        2022-10-10 2022-10-10 Outpatient R       VAMSHI [facility]    [id number]

8P-20 Univers



        08:00:00 08:00:00                 SAPPHIRE                 [id number]  [facility]

 

        2022 Outpatient R       MARY ANNE RAMIREZ [facility]    [id number]

-20 Univers



        14:00:00 14:00:00                 MARY ANNE RAMIREZ                 [id number]  [facility]

 

        2022 Outpatient R       ASHLEY NORTH [facility]    [id number]

[id number] Univers



        14:00:00 14:00:00                 ASHLEY NORTH                         ity [facility]

 

        2022 Outpatient R       VAMSHI [facility]    [id number]

8P-20 Univers



        08:30:00 08:30:00                 SAPPHIRE                 [id number]  ity

 [facility]

 

        2022 Outpatient R       SOFIAHEATHER [facility]    [id number]

[id number] Univers



        08:30:00 08:30:00                 SAPPHIRE                         ity

 [facility]

 

        2022 Outpatient R       RAKANSALOME [facility]    [id number]

8P-20 Univers



        08:00:00 08:00:00                 SAPPHIRE                 [id number]  ity

 [facility]

 

        2022 Outpatient R       VAMSHI [facility]    [id number]

[id number] Univers



        08:00:00 08:00:00                 SAPPHIRE                         ity

 [facility]

 

        2022 Outpatient R       SOFIAHEATHER [facility]    [id number]

8P-20 Univers



        11:30:00 11:30:00                 SAPPHIRE                 [id number]  ity

 [facility]

 

        2022 Outpatient R       VAMSHI [facility]    [id number]

[id number] Univers



        11:30:00 11:30:00                 SAPPHIRE                         ity

 [facility]

 

        2022 Telephone         EDEN Helms    [phone] [id number]

[id number] Univers



        00:00:00 00:00:00                 Yessica BROWN   [id number]         i

ty [facility] [id number]         Eric

as



                                                        [phone]         [address]

 

        2022 Telephone         Topete   [facility]    [phone] [id number]

[id number] Univers



        00:00:00 00:00:00                 Jessi KEARNSPEC [id number]       

  ity [facility]   [id number]         [facility]  [phone]         Medi

sarah



                                                [place] [address]             Branch



                                                DIABETES                 



                                                CLINIC                  

 

        2022-08-15 2022-08-15 Transition         IMTIAZ Rodney  1.2.840.114 958

96231 Univers



        00:00:00 00:00:00 of Care         Stephany ARAUJO DUNN   350.1.13.10         i

ty of



                                                [place]   4.2.7.2.686         [place]

[place]



                                                        [phone]         Medi

sarah



                                                        403             Branch

 

        2022 Inpatient        SCOOBY SHARPE [facility]     [id number]

07533 Univers



        22:57:00 15:56:00                                                 ity of



                                                                        [facility]

 

        2022 Hospital         Yo Law  1.2.840.

114 [id number] 

Univers



        22:57:00 15:56:00 Encounter         Keenan Uribe   350.1

.13.10         ity of



                                        [facility] 4.2.7.2.686     

    [place]



                                        Liz Phillips          [phone]     

    Scoboy Agustin         095           

  Branch

 

        2022 Anesthesia         Sheridan Delarosa  1.2.84

0.114 [id number] 

Univers



        11:25:00 12:55:00 Event           [place]   350.1.13.10    

     ity of



                                                HOSPITAL 4.2.7.2.686         Eric

as



                                                        [phone]         Medi

sarah



                                                        103             Branch

 

        2022 Surgery         DAVID Bonds  1.2.469.412 3257

3251 Univers



        10:12:00 11:55:00                 Sapphire SEALY   350.1.13.10        

 ity of



                                               HOSPITAL 4.2.7.2.686         Eric

as



                                                        [phone]         Medi

sarah



                                                        103             Branch

 

        2022 Travel                  1.2.840.1 1.2.597.911 5449

9911 Univers



        00:00:00 00:00:00                         28185.1.1 350.1.13.10         

ity of



                                                3.104.2.7 4.2.7.3.698         [place]

[place]



                                                .3.834433 084.8           Medica

l



                                                .8                      Branch

 

        2022 Transition         Thomas,  1.2.840.9 [id number]

[id number] Univers



        00:00:00 00:00:00 of Care         Dulce 72916.1.1                 i

ty of



                                                3.104.2.7                 [place]



                                                .3.880974                 Medica

l



                                                .8                      [place]

 

        2022 Inpatient X       TABITHA [facility]     [id number]

04 Univers



        18:31:00 18:27:00                 KAYLYNN                          [facility]

 

        2022 [facility]         Ashly Ortega 1.2.840.1 [id number]

[id number] 

Univers



        18:31:00 18:27:00 Encounter         BishopSwapna blacklissa 36287.1.1            

     ity of



                                        Kaylynn Lu 3.104.2.7                

 [place]



                                                .3.390505                 Medica

l



                                                .8                      [place]

 

        2022 [facility] R       [facility]    6513

28P-20 Univers



        13:00:00 13:00:00                 RADHA                 [id number]  ity o

[facility]

 

        [facility]    1041

[id number] Univers



        13:00:00 13:00:00                 RADHA melton o

[facility]

 

        2022 Travel                  1.2.840.1 1.2.350.611 9403

5846 Univers



        00:00:00 00:00:00                         42768.1.1 350.1.13.10         

ity of



                                                3.104.2.7 4.2.7.3.698         [place]

[place]



                                                .3.132197 084.8           Medica

l



                                                .8                      [place]

 

        2022-07-15 2022-07-15 Emergency X       SINGER, [facility]    ERT     [id number]

93 Univers



        17:06:00 23:29:00                 [facility]

 

        2022-07-15 2022-07-15 Emergency         Alondra Santos 1.2.840.1 1008

[id number]

                                        Univers



        17:06:00 23:29:00                 Chris Sol 57466.1.1             

    ity of



                                                3.104.2.7                 [place]



                                                .3.786775                 Medica

l



                                                .8                      [place]

 

        2022-07-15 2022-07-15 Travel                  1.2.840.1 1.2.307.630 8362

2220 Univers



        00:00:00 00:00:00                         34452.1.1 350.1.13.10         

ity of



                                                3.104.2.7 4.2.7.3.698         [place]

[place]



                                                .3.094569 084.8           Medica

l



                                                .8                      [place]

 

        2022 [facility] R       [facility]    6513

28P-20 Univers



        14:00:00 14:00:00                 RADHA                 [id number]  daniely o

[facility]

 

        2022 Outpatient R       [facility]    1040

[id number] Univers



        14:00:00 14:00:00                 RADHA melton o

[facility]

 

        2022 Emergency X       FOFANA, [facility]    ERT     [id number]

86 Univers



        04:52:00 08:20:00                 RADHA                         ity of



                                                                        [facility]

 

        2022 Emergency         Brigido, 1.2.840.8 [id number] 947

30328 Univers



        04:52:00 08:20:00                 Radha 99483.1.1                 ity 

of



                                                3.104.2.7                 [place]



                                                .3.252842                 Medica

l



                                                .8                      [place]

 

        2022 Travel                  1.2.840.1 1.2.922.998 8000

5013 Univers



        00:00:00 00:00:00                         17305.1.1 350.1.13.10         

ity of



                                                3.104.2.7 4.2.7.3.698         [place]

[place]



                                                .3.796418 084.8           Medica

l



                                                .8                      [place]

 

        2022 Transition         Guzman,  1.2.840.7 [id number] 94

[id number] Univers



        00:00:00 00:00:00 of Care         Dulce 91674.1.1                 i

ty of



                                                3.104.2.7                 [place]



                                                .3.998310                 Medica

l



                                                .8                      [place]

 

        2022 Inpatient X       ABDIEL [facility]    [place]     31067

40636 Univers



        20:31:00 21:18:00                 TREY                           ity of



                                                                        [facility]

 

        2022 [facility]         Radha Fofana 1.2.840.1 [id number]

036 [id number] 

Univers



        20:31:00 21:18:00 Encounter         Noe Phillips 89215.1.1              

   ity of



                                        Trey Meadows 3.104.2.7              

   [place]



                                                .3.596181                 Medica

l



                                                .8                      [place]

 

        2022 Transition         Thomas,  1.2.840.1 [id number] 94

[id number] Univers



        00:00:00 00:00:00 of Care         Dulce 04512.1.1                 i

ty of



                                                3.104.2.7                 [place]



                                                .3.341111                 Medica

l



                                                .8                      [place]

 

        2022 Travel                  1.2.840.1 1.2.995.340 1012

6235 Univers



        00:00:00 00:00:00                         04082.1.1 350.1.13.10         

ity of



                                                3.104.2.7 4.2.7.3.698         [place]

[place]



                                                .3.572901 084.8           Medica

l



                                                .8                      [place]

 

        2022 Transition         Thomas,  1.2.840.9 [id number] 94

323395 Univers



        00:00:00 00:00:00 of Care         Dulce 18239.1.1                 i

ty of



                                                3.104.2.7                 [place]



                                                .3.733657                 Medica

l



                                                .8                      [place]

 

        2022 Inpatient X       JOHN [facility]    [place]     [id number]

48 Univers



        22:21:00 16:16:00                 EDWARDO                           ity of



                                                                        [facility]

 

        2022 [facility]         GilesJosse 1.2.840.1 [id number]

1081 [id number] 

Univers



        22:21:00 16:16:00 Encounter         Edwardo Lopez 79328.1.1             

    ity of



                                                3.104.2.7                 [place]



                                                .3.128403                 Medica

l



                                                .8                      Branch

 

        2022 Travel                  1.2.840.1 1.2.889.706 1555

3062 Univers



        00:00:00 00:00:00                         85177.1.1 350.1.13.10         

ity of



                                                3.104.2.7 4.2.7.3.698         [place]

[place]



                                                .3.094359 084.8           Medica

l



                                                .8                      Branch

 

        2022 2022-06-10 Outpatient X       LEO, [facility]     [id number]

8802 Univers



        18:25:00 19:30:00                 MERCY                           ity of



                                                                        [facility]

 

        2022 2022-06-10 Emergency         Jose RamonFilipe gorescooby 1.2.840.1 [id number]

[id number] 

[facility]



        18:25:00 19:30:00                 BishoppapoRachel llanes 92839.1.1              

   ity of



                                                3.104.2.7                 [place]



                                                .3.744062                 Medica

l



                                                .8                      Branch

 

        2022 Travel                  1.2.840.1 1.2.012.107 9191

2645 Univers



        00:00:00 00:00:00                         60403.1.1 350.1.13.10         

ity of



                                                3.104.2.7 4.2.7.3.698         [place]

[place]



                                                .3.246719 084.8           Medica

l



                                                .8                      Branch

 

        2022 Travel                  1.2.840.1 1.2.438.434 7607

5045 Univers



        00:00:00 00:00:00                         25697.1.1 350.1.13.10         

ity of



                                                3.104.2.7 4.2.7.3.698         [place]

[place]



                                                .3.514759 084.8           Medica

l



                                                .8                      Branch

 

        2022 Transition         Rodney, 1.2.840.2 [id number] 94

[id number] Univers



        00:00:00 00:00:00 of Milka ARAUJO 99486.1.1                 ity

 of



                                                3.104.2.7                 [place]



                                                .3.046744                 Medica

l



                                                .8                      Branch

 

        2022 Inpatient X       SHAIKH [facility]    [place]     [id number]

83 Univers



        20:53:00 18:25:00                 DEAN                         itlissa o

f



                                                                        [facility]

 

        2022 [facility]         Radha Fofana 1.2.840.1 [id number]

036 [id number] 

Univers



        20:53:00 18:25:00 Encounter         Tobias Hernandez 51773.1.1           

      ity of



                                        Terminella, Efrain 3.104.2.7             

    [place]



                                        Powell, Martha H .3.154441               

  Medical



                                        Dean Segovia .8                     

 Branch

 

        2022 Travel                  1.2.840.1 1.2.772.137 4621

3172 Univers



        00:00:00 00:00:00                         61510.1.1 350.1.13.10         

ity of



                                                3.104.2.7 4.2.7.3.698         [place]

[place]



                                                .3.853227 084.8           Medica

l



                                                .8                      Branch

 

        2022 Transition         Guzman,  1.2.840.4 [id number]

[id number] Univers



        00:00:00 00:00:00 of Care         Dulce 74377.1.1                 i

ty of



                                                3.104.2.7                 [place]



                                                .3.603775                 Medica

l



                                                .8                      [place]

 

        2022-05-10 2022 Inpatient X       LETITIA [facility]    [place]     [id number]

201 Univers



        19:10:00 17:32:00                 ADNAN                           ity of



                                                                        [facility]

 

        2022-05-10 2022 [facility]         Brigido Radha 1.2.840.1 [id number]

081 [id number] 

Univers



        19:10:00 17:32:00 Encounter         Rand Dong 76166.1.1            

     ity of



                                                3.104.2.7                 [place]



                                                .3.222037                 Medica

l



                                                .8                      Branch

 

        2022-05-10 2022-05-10 Travel                  1.2.840.1 1.2.505.676 8562

6685 Univers



        00:00:00 00:00:00                         62037.1.1 350.1.13.10         

ity of



                                                3.104.2.7 4.2.7.3.698         [place]

[place]



                                                .3.652829 084.8           Medica

l



                                                .8                      [place]

 

        2022 Observatio                 [facility] 5510

[id number] Memoria



        12:00:00 22:50:00 n                       leslie Garcia 98      l



                                                        [facility]

 

        2022 Outpatient         [place], [facility]   5510

[id number] 



        07:00:00 17:50:00                 [place] [address]      

 

        2022 Outpatient U       [place], [facility]    

    [facility]



        07:00:00 17:50:00                 TREY                          

 

        2022 Outpatient         [place], [facility]   5510

[id number] 



        18:14:00 18:14:00                 Trey Crooks                       

 

        2022 [facility]       [facility]     [id number]

29 Univers



        22:45:00 01:52:00                 CHRIS melton of



                                                                        [facility]

 

        2022 Emergency         [facility]    1.2.289.721 9294

2685 Univers



        22:45:00 01:52:00                 Crhis MCCARTHY 350.1.13.10         [facility] 4.2.7.2.686         [facility]  [phone]         Medi

sarah



                                                        084             [place]

 

        2022 Inpatient                 [facility] 63152

65292 Memoria



        05:12:00 15:30:00                         r       Jose 84      l



                                                        [facility]

 

        2022 Outpatient         [facility],  [facility]   [id number]

620 



        00:12:00 10:30:00                 Bernardo Guajardo                         

 

        2022 Emergency                 [facility] 32660

43591 Memoria



        02:51:00 02:51:00                         leslie Garcia 83      l



                                                        [facility]

 

        2022 Outpatient         Ap,  [facility]   [id number]

620 



        00:12:00 00:12:00                 Bernardo Guajardo                         

 

        2022 Outpatient         Ap,  [facility]   [id number]

620 



        21:51:00 21:51:00                 Bernardo Guajardo                         

 

        2022 Emergency X       ALLISON ROMEO [facility]    ERT     [id number]

5665 Univers



        18:08:00 22:51:00                                                 ity of



                                                                        [facility]

 

        2022 Emergency         ALLISON Romeo [facility]    1.2.840.114 [id number]

[id number] Univers



        18:08:00 22:51:00                 Elena MCCARTHY 350.1.13.10         i

ty of



                                                [place] 4.2.7.2.686         [facility]  [phone]         Medi

sarah



                                                        084             Branch

 

        2022 Transition         IMTIAZ Guzman  1.2.840.114 907

86898 Univers



        00:00:00 00:00:00 of Care         Dulce DUNN   350.1.13.10        

 ity of



                                                [place]   4.2.7.2.686         [place]

[place]



                                                        [phone]         Medi

sarah



                                                        403             Branch

 

        2022 Inpatient X       [facility]    [place]     [id number]

27 Univers



        19:07:00 19:39:00                 [facility]                          ity [facility]

 

        2022 [facility]    1.2.840.

114 [id number] 

Univers



        19:07:00 19:39:00 Encounter         Rachel Bailey 350.1.13.10 

        ity of



                                                [facility] 4.2.7.2.686         [facility]  [phone]         Medi

sarah



                                                        081             Branch

 

        2022 Emergency X       ERUM, [facility]    ERT     [id number]

54 Univers



        14:41:00 22:07:00                 ALMA melton [facility]

 

        2022 Emergency         [place], [facility]    1.2.057.889 2299

7030 Univers



        14:41:00 22:07:00                 Alma MCCARTHY 350.1.13.10         

ity of



                                                [place] 4.2.7.2.686         [facility]  [phone]         [address]

 

        2022-01-15 2022-01-15 Emergency X       CANDACE [facility]    ERT     [id number]

25 Univers



        14:49:00 21:33:00                 NEETA melton [facility]

 

        2022-01-15 2022-01-15 Emergency         [facility]    1.2.523.471 5988

0725 Univers



        14:49:00 21:33:00                 Neeta MCCARTHY 350.1.13.10         

ity of



                                                [facility] 4.2.7.2.686         [facility]  [phone]         [address]

 

        2021 Laboratory         Only, Ang Db Test [facility]    1.2.8

40.114 [id number] 

Univers



        18:00:00 18:15:00 Only            Mario Alberto Nicole [facility]  350.1.13.10      

   ity of



                                                [place] 4.2.7.2.686         Eric

as



                                                HÉCTOR?BLEA [phone]         [address]



                                                MEDICAL                 



                                                OFFICE                  



                                                BUILDING                 

 

        2021 Outpatient R       [facility]    [id number]

787 Univers



        18:00:00 18:00:00                 NICOLE                          [facility]

 

        2021 ambulatory                 STLMLC  STLMLC  [id number]

 Common



        00:00:00 00:00:00                                                 [facility]

 

        2021 ambulatory                 STLMLC  STLMLC  [id number]

 Common



        00:00:00 00:00:00                                                 [facility]

 

        2021 Emergency X       [facility]     [id number]

74 Univers



        15:54:00 18:34:00                 CHRIS melton [facility]

 

        2021 Emergency         [facility]    1.2.547.090 9110

8236 Univers



        15:54:00 18:34:00                 Chris MCCARTHY 350.1.13.10         i

ty of



                                                [facility] 4.2.7.2.686         [facility]  [phone]         [address]

 

        2021-10-12 2021-10-12 Outpatient                 STLMLC  STLMLC  [id number]

 Common



        00:00:00 00:00:00                                                 [facility]

 

        2021 Outpatient                 STLMLC  STLMLC  [id number]

 Common



        00:00:00 00:00:00                                                 [facility]

 

        2021-08-10 2021-08-10 Outpatient                 STLMLC  STLMLC  [id number]

 Common



        00:00:00 00:00:00                                                 [facility]

 

        2021 Outpatient                 STLMLC  STLMLC  [id number]

 Common



        00:00:00 00:00:00                                                 [facility]

 

        2021 Outpatient                 STLMLC  STLMLC  [id number]

 Common



        00:00:00 00:00:00                                                 [facility]

 

        2021 Emergency         [facility]    [id number]

[id number] 



        18:22:00 22:21:00                 Acacia TRENT [place] [id number]        

 



                                                [place] [id number]         



                                                [place]  [phone]         



                                                        [facility]             

 

        2021 Emergency         [facility]    [id number]

[id number] [facility]



        18:22:00 22:21:00                 Acacia TRENT [place] 350.1.13.10        

 ity of



                                                [place] [id number]         [facility]  [phone]         [address]

 

        2021 Emergency X       [facility]     [id number]

[id number] [facility]



        18:22:00 18:22:00                 FOLUSHO                         ity of



                                                                        [facility]

 

        2019 Phone                   nullFlavo MNA     [id number]

55 Memoria



        16:11:26 04:59:59 Message                 r       Neurosurger 08      l



                                                        y [facility]

 

        2019 Phone                   nullFlavo MNA     [id number]

55 [facility]



        16:11:26 04:59:59 Message                 r       Neurosurger 08      l



                                                        y [facility]

 

        2019 Outpatient                 MHMISCHER MHMISCHER 551

[id number] 



        11:11:26 23:59:59                                         08      

 

        2019 Phone                   nullFlavo MNA     [id number]

55 [facility]



        16:16:47 04:59:59 Message                 r       Neurosurger 07      l



                                                        y [facility]

 

        2019 Phone                   nullFlavo MNA     [id number]

55 [facility]



        16:16:47 04:59:59 Message                 r       Neurosurger 07      l



                                                        y [facility]

 

        2019 [facility]                 [facility]

[id number] 



        11:16:47 23:59:59                                         07      

 

        2019-07-15 2019 Phone                   nullFlavo MNA     [id number]

55 [facility]



        15:52:03 04:59:59 Message                 r       Neurosurger 06      l



                                                        y [facility]

 

        2019-07-15 2019 Phone                   nullFlavo MNA     [id number]

55 [facility]



        15:52:03 04:59:59 Message                 r       Neurosurger 06      l



                                                        y [facility]

 

        2019-07-15 2019 [facility]                 [facility]

[id number] 



        10:52:03 23:59:59                                         06      

 

        2019 Phone                   nullFlavo MNA     [id number]

55 Memoria



        18:55:03 04:59:59 Message                 r       Neurosurger 05      l



                                                        y [facility]

 

        2019 Phone                   nullFlavo MNA     [id number]

55 [facility]oria



        18:55:03 04:59:59 Message                 r       Neurosurger 05      l



                                                        y [facility]

 

        2019 [facility]                 [facility]

[id number] 



        13:55:03 23:59:59                                               

 

        2018 Emergency                 [facility]avo [facility] [id number]

[id number] Memoria



        10:12:00 18:24:00                         leslie       [place] [address] [place] [address]

 

        2018 Emergency                 [facility] [id number]

[id number] [facility]



        10:12:00 18:24:00                         leslie       [place] [address] [place] [address]

 

        2018 Outpatient         [facility]   5510

[id number] 



        04:12:00 12:24:00                 Deisy Dial      

 

        2018 Outpatient                 [facility]

[id number] Common



        11:15:00 11:15:00                         [place]   [address]         Spir

it



                                                Drive   [facility]

 

        2018 Outpatient                 [facility]

[id number] Common



        11:30:00 11:30:00                         [place]   [address]         Spir

it



                                                Drive   [facility]

 

        2018 Outpatient                 [facility]

[id number] Common



        15:41:00 15:41:00                         [place]   [address]         Spir

it



                                                Drive   [facility]

 

        2018 Outpatient                 [facility]

[id number] Common



        15:42:00 15:42:00                         [place]   [address]         Spir

it



                                                Drive   [facility]

 

        2018 Outpatient                 [facility]

[id number] Common



        11:00:00 11:00:00                         [place]   [address]         Spir

it



                                                Drive   [facility]

 

        2018 Inpatient                 [facility] [address]

[id number] Memoria



        22:40:00 17:28:00                         r       [address]

 

        2018 Inpatient                 [facility] [address]

[id number] Memoria



        22:40:00 17:28:00                         [address]

 

        2018 Outpatient         Deedee Reinoso [facility]   551

[id number] 



        17:40:00 12:28:00                 Jamie                   23      







Results







           Test Description Test Time  Test Comments Results    Result Comments 

Source









                    TISSUE CULTURE(AEROBIC/ANAEROBIC) 2022 20:34:19 









                      Test Item  Value      Reference Range Interpretation Comme

nts









             TISSUE CULTURE (test code = 20474-3) No aerobic/anaerobic organisms

 isolated                           

 

             Gram stain (test code = 664-3) No PMNs or Mononuclear cells observe

d                           



[facility] GLUCOSE (AUTOMATED)2022 18:36:41





             Test Item    Value        Reference Range Interpretation Comments

 

             POCT GLU (test code = 3294556167) 162 mg/dL           H      

      

 

             Lab Interpretation (test code = Abnormal                           

    



             39822-8)                                            



[facility] GLUCOSE (AUTOMATED)2022 14:48:29





             Test Item    Value        Reference Range Interpretation Comments

 

             POCT GLU (test code = 3668906717) 191 mg/dL           H      

      

 

             Lab Interpretation (test code = Abnormal                           

    



             56207-8)                                            



[facility] GLUCOSE (AUTOMATED)2022 10:56:47





             Test Item    Value        Reference Range Interpretation Comments

 

             POCT GLU (test code = 4534981970) 242 mg/dL           H      

      

 

             Lab Interpretation (test code = Abnormal                           

    



             19529-5)                                            



[facility] GLUCOSE (AUTOMATED)2022 05:47:30





             Test Item    Value        Reference Range Interpretation Comments

 

             POCT GLU (test code = 0666527838) 327 mg/dL           H      

      

 

             Lab Interpretation (test code = Abnormal                           

    



             58003-2)                                            



[facility] GLUCOSE (AUTOMATED)2022 02:18:34





             Test Item    Value        Reference Range Interpretation Comments

 

             POCT GLU (test code = 8566136377) 309 mg/dL           H      

      

 

             Lab Interpretation (test code = Abnormal                           

    



             25581-5)                                            



[facility] GLUCOSE (AUTOMATED)2022 23:17:38





             Test Item    Value        Reference Range Interpretation Comments

 

             POCT GLU (test code = 0472075878) 260 mg/dL           H      

      

 

             Lab Interpretation (test code = Abnormal                           

    



             87639-6)                                            



[facility] GLUCOSE (AUTOMATED)2022 18:33:41





             Test Item    Value        Reference Range Interpretation Comments

 

             POCT GLU (test code = 1047989339) 264 mg/dL           H      

      

 

             Lab Interpretation (test code = Abnormal                           

    



             84397-5)                                            



[facility] GLUCOSE (AUTOMATED)2022 15:02:50





             Test Item    Value        Reference Range Interpretation Comments

 

             POCT GLU (test code = 4633540629) 219 mg/dL           H      

      

 

             Lab Interpretation (test code = Abnormal                           

    



             44634-1)                                            



[facility] METABOLIC PANEL (NA, K, CL, CO2, 
GLUCOSE, BUN, CREATININE, CA)2022 10:44:27





             Test Item    Value        Reference Range Interpretation Comments

 

             NA (test code = 132 mmol/L   135-145      L            



             7822905833)                                         

 

             K (test code = 4.4 mmol/L   3.5-5                     



             7266146882)                                         

 

             CL (test code = 103 mmol/L                       



             2007524961)                                         

 

             CO2 TOTAL (test code = 25 mmol/L    23-31                     



             9561143206)                                         

 

             AGAP (test code =              2-16                      



             6488155324)                                         

 

             BUN (test code = 15 mg/dL     7-23                      



             9130526187)                                         

 

             GLUCOSE (test code = 262 mg/dL           H            



             5104289795)                                         

 

             CREATININE (test code = 0.52 mg/dL   0.6-1.25     L            



             8083151878)                                         

 

             CALCIUM (test code = 8.3 mg/dL    8.6-10.6     L            



             2222237378)                                         

 

             eGFR (test code =              mL/min/1.73m2              



             0448789979)                                         

 

             MAL (test code = MAL) Association of                           



                          Glomerular Filtration                           



                          Rate (GFR) and Staging                           



                          of Kidney Disease*                           



                          +---------------------                           



                          --+-------------------                           



                          --+-------------------                           



                          ------+| GFR                           



                          (mL/min/1.73 m2) ?|                           



                          With Kidney Damage ?|                           



                          ?Without Kidney                           



                          Damage+---------------                           



                          --------+-------------                           



                          --------+-------------                           



                          ------------+| ?>90 ?                           



                          ? ? ? ? ? ? ? ?|                           



                          ?Stage one ? ? ? ? ?|                           



                          ? Normal ? ? ? ? ? ? ?                           



                          ?+--------------------                           



                          ---+------------------                           



                          ---+------------------                           



                          -------+| ?60-89 ? ? ?                           



                          ? ? ? ? ?| ?Stage two                           



                          ? ? ? ? ?| ? Decreased                           



                          GFR ? ? ? ?                            



                          +---------------------                           



                          --+-------------------                           



                          --+-------------------                           



                          ------+| ?30-59 ? ? ?                           



                          ? ? ? ? ?| ?Stage                           



                          three ? ? ? ?| ? Stage                           



                          three ? ? ? ? ?                           



                          +---------------------                           



                          --+-------------------                           



                          --+-------------------                           



                          ------+| ?15-29 ? ? ?                           



                          ? ? ? ? ?| ?Stage four                           



                          ? ? ? ? | ? Stage four                           



                          ? ? ? ? ?                              



                          ?+--------------------                           



                          ---+------------------                           



                          ---+------------------                           



                          -------+| ?<15 (or                           



                          dialysis) ? ?| ?Stage                           



                          five ? ? ? ? | ? Stage                           



                          five ? ? ? ? ?                           



                          ?+--------------------                           



                          ---+------------------                           



                          ---+------------------                           



                          -------+ *Each stage                           



                          assumes the associated                           



                          GFR level has been in                           



                          effect for at least                           



                          three months. ?Stages                           



                          1 to 5, with or                           



                          without kidney                           



                          disease, indicate                           



                          chronic kidney                           



                          disease. Notes:                           



                          Determination of                           



                          stages one and two                           



                          (with eGFR                             



                          >59mL/min/1.73 m2)                           



                          requires estimation of                           



                          kidney damage for at                           



                          least three months as                           



                          defined by structural                           



                          or functional                           



                          abnormalities of the                           



                          kidney, manifested by                           



                          either:Pathological                           



                          abnormalities or                           



                          Markers of kidney                           



                          damage (including                           



                          abnormalities in the                           



                          composition of the                           



                          blood or urine or                           



                          abnormalities in                           



                          imaging tests).                           

 

             Lab Interpretation Abnormal                               



             (test code = 17612-8)                                        



[facility] WITH [id number] 10:22:05





             Test Item    Value        Reference Range Interpretation Comments

 

             WBC (test code =              See_Comment                [Automated



             6690-2)                                             message] The sy

stem



                                                                 which generated



                                                                 this result



                                                                 transmitted



                                                                 reference range

:



                                                                 4.20 - 10.70



                                                                 10*3/?L. The



                                                                 reference range

 was



                                                                 not used to



                                                                 interpret this



                                                                 result as



                                                                 normal/abnormal

.

 

             RBC (test code =              See_Comment  L             [Automated



             789-8)                                              message] The sy

stem



                                                                 which generated



                                                                 this result



                                                                 transmitted



                                                                 reference range

:



                                                                 4.26 - 5.52



                                                                 10*6/?L. The



                                                                 reference range

 was



                                                                 not used to



                                                                 interpret this



                                                                 result as



                                                                 normal/abnormal

.

 

             HGB (test code = 10.5 g/dL    12.2-16.4    L            



             718-7)                                              

 

             HCT (test code = 31.2 %       38.4-49.3    L            



             4544-3)                                             

 

             MCV (test code = 88.9 fL      81.7-95.6                 



             787-2)                                              

 

             MCH (test code = 29.9 pg      26.1-32.7                 



             785-6)                                              

 

             MCHC (test code = 33.7 g/dL    31.2-35                   



             786-4)                                              

 

             RDW-SD (test code = 49.8 fL      38.5-51.6                 



             13830-6)                                            

 

             RDW-CV (test code = 15.9 %       12.1-15.4    H            



             788-0)                                              

 

             PLT (test code =              See_Comment  H             [Automated



             777-3)                                              message] The sy

stem



                                                                 which generated



                                                                 this result



                                                                 transmitted



                                                                 reference range

:



                                                                 150 - 328 10*3/

?L.



                                                                 The reference r

zhen



                                                                 was not used to



                                                                 interpret this



                                                                 result as



                                                                 normal/abnormal

.

 

             MPV (test code = 10.4 fL      9.8-13                    



             80626-1)                                            

 

             NRBC/100 WBC (test              See_Comment                [Automat

ed



             code = 9646781252)                                        message] 

The system



                                                                 which generated



                                                                 this result



                                                                 transmitted



                                                                 reference range

:



                                                                 0.0 - 10.0 /100



                                                                 WBCs. The refer

ence



                                                                 range was not u

sed



                                                                 to interpret th

is



                                                                 result as



                                                                 normal/abnormal

.

 

             NRBC x10^3 (test code              See_Comment                [Auto

mated



             = [id number])                                        message] The s

ystem



                                                                 which generated



                                                                 this result



                                                                 transmitted



                                                                 reference range

:



                                                                 10*3/?L. The



                                                                 reference range

 was



                                                                 not used to



                                                                 interpret this



                                                                 result as



                                                                 normal/abnormal

.

 

             GRAN MAT (NEUT) % 59.8 %                                 



             (test code = 770-8)                                        

 

             IMM GRAN % (test code 1.20 %                                 



             = [id number])                                        

 

             LYMPH % (test code = 28.2 %                                 



             736-9)                                              

 

             MONO % (test code = 8.4 %                                  



             5905-5)                                             

 

             EOS % (test code = 2.2 %                                  



             713-8)                                              

 

             BASO % (test code = 0.2 %                                  



             706-2)                                              

 

             GRAN MAT x10^3(ANC) 5.34 10*3/uL 1.99-6.95                 



             (test code =                                        



             6733433732)                                         

 

             IMM GRAN x10^3 (test 0.11 10*3/uL 0-0.06       H            



             code = 4221655394)                                        

 

             LYMPH x10^3 (test code 2.52 10*3/uL 1.09-3.23                 



             = 731-0)                                            

 

             MONO x10^3 (test code 0.75 10*3/uL 0.36-1.02                 



             = 742-7)                                            

 

             EOS x10^3 (test code = 0.20 10*3/uL 0.06-0.53                 



             711-2)                                              

 

             BASO x10^3 (test code              0.01-0.09                 



             = 704-7)                                            

 

             Lab Interpretation Abnormal                               



             (test code = 51241-7)                                        



[facility] GLUCOSE (AUTOMATED)2022 02:20:10





             Test Item    Value        Reference Range Interpretation Comments

 

             POCT GLU (test code = 2139504318) 281 mg/dL           H      

      

 

             Lab Interpretation (test code = Abnormal                           

    



             50636-9)                                            



[facility] GLUCOSE (AUTOMATED)2022-08-10 22:27:37





             Test Item    Value        Reference Range Interpretation Comments

 

             POCT GLU (test code = 7966107965) 187 mg/dL           H      

      

 

             Lab Interpretation (test code = Abnormal                           

    



             68010-8)                                            



[facility] GLUCOSE (AUTOMATED)2022-08-10 19:00:16





             Test Item    Value        Reference Range Interpretation Comments

 

             POCT GLU (test code = 7370920840) 167 mg/dL           H      

      

 

             Lab Interpretation (test code = Abnormal                           

    



             05572-5)                                            



[facility] GLUCOSE (AUTOMATED)2022-08-10 19:00:16





             Test Item    Value        Reference Range Interpretation Comments

 

             POCT GLU (test code = 2520199569) 167 mg/dL           H      

      

 

             Lab Interpretation (test code = Abnormal                           

    



             37452-5)                                            



[facility] GLUCOSE (AUTOMATED)2022-08-10 15:22:04





             Test Item    Value        Reference Range Interpretation Comments

 

             POCT GLU (test code = 9809857377) 138 mg/dL           H      

      

 

             Lab Interpretation (test code = Abnormal                           

    



             51450-9)                                            



[facility] GLUCOSE (AUTOMATED)2022-08-10 15:22:04





             Test Item    Value        Reference Range Interpretation Comments

 

             POCT GLU (test code = 3737496786) 138 mg/dL           H      

      

 

             Lab Interpretation (test code = Abnormal                           

    



             33713-4)                                            



[facility] GLUCOSE (AUTOMATED)2022-08-10 02:45:31





             Test Item    Value        Reference Range Interpretation Comments

 

             POCT GLU (test code = 2721744055) 142 mg/dL           H      

      

 

             Lab Interpretation (test code = Abnormal                           

    



             94124-2)                                            



[facility] GLUCOSE (AUTOMATED)2022-08-10 02:45:31





             Test Item    Value        Reference Range Interpretation Comments

 

             POCT GLU (test code = 9386842127) 142 mg/dL           H      

      

 

             Lab Interpretation (test code = Abnormal                           

    



             02975-5)                                            



[facility] GLUCOSE (AUTOMATED)2022 22:04:41





             Test Item    Value        Reference Range Interpretation Comments

 

             POCT GLU (test code = 9060918998) 260 mg/dL           H      

      

 

             Lab Interpretation (test code = Abnormal                           

    



             74675-4)                                            



[facility] GLUCOSE (AUTOMATED)2022 22:04:41





             Test Item    Value        Reference Range Interpretation Comments

 

             POCT GLU (test code = 8695127020) 260 mg/dL           H      

      

 

             Lab Interpretation (test code = Abnormal                           

    



             94767-6)                                            



[facility]  
[facility]





             Test Item    Value        Reference Range Interpretation Comments

 

             Blood Culture-Aerobic No organisms No growth                 Previo

us



             (test code = 17928-3) isolated                               prelim

inary



                                                                 verified result



                                                                 was Culture In



                                                                 Progress on



                                                                 2022 at 190

1



                                                                 CDTPrevious



                                                                 preliminary



                                                                 verified result



                                                                 was No growth a

t



                                                                 24 hours on



                                                                 2022 at 160

1



                                                                 CDTPrevious



                                                                 preliminary



                                                                 verified result



                                                                 was No growth a

t



                                                                 48 hours on



                                                                 2022 at 160

1



                                                                 CDTPrevious



                                                                 preliminary



                                                                 verified result



                                                                 was No growth a

t



                                                                 72 hours on



                                                                 2022 at 160

1



                                                                 CDT

 

             Blood        No organisms No growth                 Previous



             Culture-Anaerobic isolated                               preliminar

y



             (test code = 78786-8)                                        verifi

ed result



                                                                 was Culture In



                                                                 Progress on



                                                                 2022 at 190

1



                                                                 CDTPrevious



                                                                 preliminary



                                                                 verified result



                                                                 was No growth a

t



                                                                 24 hours on



                                                                 2022 at 160

1



                                                                 CDTPrevious



                                                                 preliminary



                                                                 verified result



                                                                 was No growth a

t



                                                                 48 hours on



                                                                 2022 at 160

1



                                                                 CDTPrevious



                                                                 preliminary



                                                                 verified result



                                                                 was No growth a

t



                                                                 72 hours on



                                                                 2022 at 160

1



                                                                 CDT

 

             Lab Interpretation Normal                                 



             (test code = 57974-7)                                        



[facility] [id number] 
21:01:35





             Test Item    Value        Reference Range Interpretation Comments

 

             Blood Culture-Aerobic No organisms No growth                 Previo

us



             (test code = 17928-3) isolated                               prelim

inary



                                                                 verified result



                                                                 was Culture In



                                                                 Progress on



                                                                 2022 at 190

1



                                                                 CDTPrevious



                                                                 preliminary



                                                                 verified result



                                                                 was No growth a

t



                                                                 24 hours on



                                                                 2022 at 160

1



                                                                 CDTPrevious



                                                                 preliminary



                                                                 verified result



                                                                 was No growth a

t



                                                                 48 hours on



                                                                 2022 at 160

1



                                                                 CDTPrevious



                                                                 preliminary



                                                                 verified result



                                                                 was No growth a

t



                                                                 72 hours on



                                                                 2022 at 160

1



                                                                 CDT

 

             Blood        No organisms No growth                 Previous



             Culture-Anaerobic isolated                               preliminar

y



             (test code = 38111-3)                                        verifi

ed result



                                                                 was Culture In



                                                                 Progress on



                                                                 2022 at 190

1



                                                                 CDTPrevious



                                                                 preliminary



                                                                 verified result



                                                                 was No growth a

t



                                                                 24 hours on



                                                                 2022 at 160

1



                                                                 CDTPrevious



                                                                 preliminary



                                                                 verified result



                                                                 was No growth a

t



                                                                 48 hours on



                                                                 2022 at 160

1



                                                                 CDTPrevious



                                                                 preliminary



                                                                 verified result



                                                                 was No growth a

t



                                                                 72 hours on



                                                                 2022 at 160

1



                                                                 CDT

 

             Lab Interpretation Normal                                 



             (test code = 50147-9)                                        



[facility] Culture - Peripheral Vein #  
21:01:35





             Test Item    Value        Reference Range Interpretation Comments

 

             Blood Culture-Aerobic No organisms No growth                 Previo

us



             (test code = 17928-3) isolated                               prelim

inary



                                                                 verified result



                                                                 was Culture In



                                                                 Progress on



                                                                 2022 at 190

1



                                                                 CDTPrevious



                                                                 preliminary



                                                                 verified result



                                                                 was No growth a

t



                                                                 24 hours on



                                                                 2022 at 160

1



                                                                 CDTPrevious



                                                                 preliminary



                                                                 verified result



                                                                 was No growth a

t



                                                                 48 hours on



                                                                 2022 at 160

1



                                                                 CDTPrevious



                                                                 preliminary



                                                                 verified result



                                                                 was No growth a

t



                                                                 72 hours on



                                                                 2022 at 160

1



                                                                 CDT

 

             Blood        No organisms No growth                 Previous



             Culture-Anaerobic isolated                               preliminar

y



             (test code = 15079-8)                                        verifi

ed result



                                                                 was Culture In



                                                                 Progress on



                                                                 2022 at 190

1



                                                                 CDTPrevious



                                                                 preliminary



                                                                 verified result



                                                                 was No growth a

t



                                                                 24 hours on



                                                                 2022 at 160

1



                                                                 CDTPrevious



                                                                 preliminary



                                                                 verified result



                                                                 was No growth a

t



                                                                 48 hours on



                                                                 2022 at 160

1



                                                                 CDTPrevious



                                                                 preliminary



                                                                 verified result



                                                                 was No growth a

t



                                                                 72 hours on



                                                                 2022 at 160

1



                                                                 CDT

 

             Lab Interpretation Normal                                 



             (test code = 28841-0)                                        



[facility] Culture - Peripheral [id number] 
21:01:35





             Test Item    Value        Reference Range Interpretation Comments

 

             Blood Culture-Aerobic No organisms No growth                 Previo

us



             (test code = 17928-3) isolated                               prelim

inary



                                                                 verified result



                                                                 was Culture In



                                                                 Progress on



                                                                 2022 at 190

1



                                                                 CDTPrevious



                                                                 preliminary



                                                                 verified result



                                                                 was No growth a

t



                                                                 24 hours on



                                                                 2022 at 160

1



                                                                 CDTPrevious



                                                                 preliminary



                                                                 verified result



                                                                 was No growth a

t



                                                                 48 hours on



                                                                 2022 at 160

1



                                                                 CDTPrevious



                                                                 preliminary



                                                                 verified result



                                                                 was No growth a

t



                                                                 72 hours on



                                                                 2022 at 160

1



                                                                 CDT

 

             Blood        No organisms No growth                 Previous



             Culture-Anaerobic isolated                               preliminar

y



             (test code = 59525-7)                                        verifi

ed result



                                                                 was Culture In



                                                                 Progress on



                                                                 2022 at 190

1



                                                                 CDTPrevious



                                                                 preliminary



                                                                 verified result



                                                                 was No growth a

t



                                                                 24 hours on



                                                                 2022 at 160

1



                                                                 CDTPrevious



                                                                 preliminary



                                                                 verified result



                                                                 was No growth a

t



                                                                 48 hours on



                                                                 2022 at 160

1



                                                                 CDTPrevious



                                                                 preliminary



                                                                 verified result



                                                                 was No growth a

t



                                                                 72 hours on



                                                                 2022 at 160

1



                                                                 CDT

 

             Lab Interpretation Normal                                 



             (test code = 62352-4)                                        



[facility] GLUCOSE (AUTOMATED)2022 14:13:49





             Test Item    Value        Reference Range Interpretation Comments

 

             POCT GLU (test code = 3635491290) 176 mg/dL           H      

      

 

             Lab Interpretation (test code = Abnormal                           

    



             48076-0)                                            



[facility] GLUCOSE (AUTOMATED)2022 14:13:49





             Test Item    Value        Reference Range Interpretation Comments

 

             POCT GLU (test code = 3102576739) 176 mg/dL           H      

      

 

             Lab Interpretation (test code = Abnormal                           

    



             30380-4)                                            



[facility] GLUCOSE (AUTOMATED)2022 02:30:54





             Test Item    Value        Reference Range Interpretation Comments

 

             POCT GLU (test code = 7330547871) 113 mg/dL           H      

      

 

             Lab Interpretation (test code = Abnormal                           

    



             71512-7)                                            



[facility] GLUCOSE (AUTOMATED)2022 02:30:54





             Test Item    Value        Reference Range Interpretation Comments

 

             POCT GLU (test code = 8007691484) 113 mg/dL           H      

      

 

             Lab Interpretation (test code = Abnormal                           

    



             54892-2)                                            



[facility] GLUCOSE (AUTOMATED)2022 23:41:12





             Test Item    Value        Reference Range Interpretation Comments

 

             POCT GLU (test code = 1049360380) 135 mg/dL           H      

      

 

             Lab Interpretation (test code = Abnormal                           

    



             35575-0)                                            



[facility] GLUCOSE (AUTOMATED)2022 23:41:12





             Test Item    Value        Reference Range Interpretation Comments

 

             POCT GLU (test code = 6902753455) 135 mg/dL           H      

      

 

             Lab Interpretation (test code = Abnormal                           

    



             37031-3)                                            



[facility] GLUCOSE (AUTOMATED)2022 17:13:18





             Test Item    Value        Reference Range Interpretation Comments

 

             POCT GLU (test code = 5488516917) 238 mg/dL           H      

      

 

             Lab Interpretation (test code = Abnormal                           

    



             21387-2)                                            



[facility] GLUCOSE (AUTOMATED)2022 17:13:18





             Test Item    Value        Reference Range Interpretation Comments

 

             POCT GLU (test code = 1027481923) 238 mg/dL           H      

      

 

             Lab Interpretation (test code = Abnormal                           

    



             17788-9)                                            



[facility] GLUCOSE (AUTOMATED)2022 17:13:18





             Test Item    Value        Reference Range Interpretation Comments

 

             POCT GLU (test code = 3985388544) 238 mg/dL           H      

      

 

             Lab Interpretation (test code = Abnormal                           

    



             08583-3)                                            



[id number]53





             Test Item    Value        Reference Range Interpretation Comments

 

             CRP (test code = 0.9 mg/dL    See_Comment  H             [Automated

 message]



             8171362681)                                         The system [employer]



                                                                 generated this



                                                                 result transmit

huma



                                                                 reference range

:



                                                                 <=0.8. The refe

rence



                                                                 range was not u

sed



                                                                 to interpret th

is



                                                                 result as



                                                                 normal/abnormal

.

 

             Lab Interpretation (test Abnormal                               



             code = 14622-8)                                        



[facility] [id number] 16:50:53





             Test Item    Value        Reference Range Interpretation Comments

 

             CRP (test code = 0.9 mg/dL    See_Comment  H             [Automated

 message]



             0487356002)                                         The system [employer]



                                                                 generated this



                                                                 result transmit

huma



                                                                 reference range

:



                                                                 <=0.8. The refe

rence



                                                                 range was not u

sed



                                                                 to interpret th

is



                                                                 result as



                                                                 normal/abnormal

.

 

             Lab Interpretation (test Abnormal                               



             code = 51830-3)                                        



[facility] [id number] 16:50:53





             Test Item    Value        Reference Range Interpretation Comments

 

             CRP (test code = 0.9 mg/dL    See_Comment  H             [Automated

 message]



             3350719189)                                         The system [employer]



                                                                 generated this



                                                                 result transmit

huma



                                                                 reference range

:



                                                                 <=0.8. The refe

rence



                                                                 range was not u

sed



                                                                 to interpret th

is



                                                                 result as



                                                                 normal/abnormal

.

 

             Lab Interpretation (test Abnormal                               



             code = 21390-5)                                        



[facility] GLUCOSE (AUTOMATED)2022 15:55:49





             Test Item    Value        Reference Range Interpretation Comments

 

             POCT GLU (test code = 3410162428) 209 mg/dL           H      

      

 

             Lab Interpretation (test code = Abnormal                           

    



             57879-3)                                            



[facility] GLUCOSE (AUTOMATED)2022 15:55:49





             Test Item    Value        Reference Range Interpretation Comments

 

             POCT GLU (test code = 8008231149) 209 mg/dL           H      

      

 

             Lab Interpretation (test code = Abnormal                           

    



             96707-2)                                            



[facility] GLUCOSE (AUTOMATED)2022 00:58:44





             Test Item    Value        Reference Range Interpretation Comments

 

             POCT GLU (test code = 7423730020) 300 mg/dL           H      

      

 

             Lab Interpretation (test code = Abnormal                           

    



             05228-0)                                            



[facility] GLUCOSE (AUTOMATED)2022 00:58:44





             Test Item    Value        Reference Range Interpretation Comments

 

             POCT GLU (test code = 0246072846) 300 mg/dL           H      

      

 

             Lab Interpretation (test code = Abnormal                           

    



             52488-9)                                            



[facility] GLUCOSE (AUTOMATED)2022 21:28:03





             Test Item    Value        Reference Range Interpretation Comments

 

             POCT GLU (test code = 9731711451) 119 mg/dL           H      

      

 

             Lab Interpretation (test code = Abnormal                           

    



             14156-2)                                            



[facility] GLUCOSE (AUTOMATED)2022 21:28:03





             Test Item    Value        Reference Range Interpretation Comments

 

             POCT GLU (test code = 7420457724) 119 mg/dL           H      

      

 

             Lab Interpretation (test code = Abnormal                           

    



             39885-1)                                            



[facility] GLUCOSE (AUTOMATED)2022 16:51:05





             Test Item    Value        Reference Range Interpretation Comments

 

             POCT GLU (test code = 6159510869) 275 mg/dL           H      

      

 

             Lab Interpretation (test code = Abnormal                           

    



             59075-4)                                            



[facility] GLUCOSE (AUTOMATED)2022 16:51:05





             Test Item    Value        Reference Range Interpretation Comments

 

             POCT GLU (test code = 2106715567) 275 mg/dL           H      

      

 

             Lab Interpretation (test code = Abnormal                           

    



             28179-2)                                            



[facility] METABOLIC PANEL (NA, K, CL, CO2, 
GLUCOSE, BUN, CREATININE, CA)2022 15:51:39





             Test Item    Value        Reference Range Interpretation Comments

 

             NA (test code = 136 mmol/L   135-145                   



             4535597756)                                         

 

             K (test code = 3.7 mmol/L   3.5-5                     



             6561700634)                                         

 

             CL (test code = 111 mmol/L          H            



             3571726274)                                         

 

             CO2 TOTAL (test code = 21 mmol/L    23-31        L            



             5918290254)                                         

 

             AGAP (test code =              2-16                      



             7980032704)                                         

 

             BUN (test code = 9 mg/dL      7-23                      



             1708749251)                                         

 

             GLUCOSE (test code = 278 mg/dL           H            



             9721231071)                                         

 

             CREATININE (test code = 0.46 mg/dL   0.6-1.25     L            



             7714981835)                                         

 

             CALCIUM (test code = 8.2 mg/dL    8.6-10.6     L            



             3945289172)                                         

 

             eGFR (test code =              mL/min/1.73m2              



             0864822458)                                         

 

             MAL (test code = MAL) Association of                           



                          Glomerular Filtration                           



                          Rate (GFR) and Staging                           



                          of Kidney Disease*                           



                          +---------------------                           



                          --+-------------------                           



                          --+-------------------                           



                          ------+| GFR                           



                          (mL/min/1.73 m2) ?|                           



                          With Kidney Damage ?|                           



                          ?Without Kidney                           



                          Damage+---------------                           



                          --------+-------------                           



                          --------+-------------                           



                          ------------+| ?>90 ?                           



                          ? ? ? ? ? ? ? ?|                           



                          ?Stage one ? ? ? ? ?|                           



                          ? Normal ? ? ? ? ? ? ?                           



                          ?+--------------------                           



                          ---+------------------                           



                          ---+------------------                           



                          -------+| ?60-89 ? ? ?                           



                          ? ? ? ? ?| ?Stage two                           



                          ? ? ? ? ?| ? Decreased                           



                          GFR ? ? ? ?                            



                          +---------------------                           



                          --+-------------------                           



                          --+-------------------                           



                          ------+| ?30-59 ? ? ?                           



                          ? ? ? ? ?| ?Stage                           



                          three ? ? ? ?| ? Stage                           



                          three ? ? ? ? ?                           



                          +---------------------                           



                          --+-------------------                           



                          --+-------------------                           



                          ------+| ?15-29 ? ? ?                           



                          ? ? ? ? ?| ?Stage four                           



                          ? ? ? ? | ? Stage four                           



                          ? ? ? ? ?                              



                          ?+--------------------                           



                          ---+------------------                           



                          ---+------------------                           



                          -------+| ?<15 (or                           



                          dialysis) ? ?| ?Stage                           



                          five ? ? ? ? | ? Stage                           



                          five ? ? ? ? ?                           



                          ?+--------------------                           



                          ---+------------------                           



                          ---+------------------                           



                          -------+ *Each stage                           



                          assumes the associated                           



                          GFR level has been in                           



                          effect for at least                           



                          three months. ?Stages                           



                          1 to 5, with or                           



                          without kidney                           



                          disease, indicate                           



                          chronic kidney                           



                          disease. Notes:                           



                          Determination of                           



                          stages one and two                           



                          (with eGFR                             



                          >59mL/min/1.73 m2)                           



                          requires estimation of                           



                          kidney damage for at                           



                          least three months as                           



                          defined by structural                           



                          or functional                           



                          abnormalities of the                           



                          kidney, manifested by                           



                          either:Pathological                           



                          abnormalities or                           



                          Markers of kidney                           



                          damage (including                           



                          abnormalities in the                           



                          composition of the                           



                          blood or urine or                           



                          abnormalities in                           



                          imaging tests).                           

 

             Lab Interpretation Abnormal                               



             (test code = 73873-1)                                        



[facility] METABOLIC PANEL (NA, K, CL, CO2, 
GLUCOSE, BUN, CREATININE, CA)2022 15:51:39





             Test Item    Value        Reference Range Interpretation Comments

 

             NA (test code = 136 mmol/L   135-145                   



             8278643264)                                         

 

             K (test code = 3.7 mmol/L   3.5-5                     



             7542737371)                                         

 

             CL (test code = 111 mmol/L          H            



             1848097585)                                         

 

             CO2 TOTAL (test code = 21 mmol/L    23-31        L            



             7517448316)                                         

 

             AGAP (test code =              2-16                      



             4073603537)                                         

 

             BUN (test code = 9 mg/dL      7-23                      



             1593776964)                                         

 

             GLUCOSE (test code = 278 mg/dL           H            



             4673977503)                                         

 

             CREATININE (test code = 0.46 mg/dL   0.6-1.25     L            



             6503741145)                                         

 

             CALCIUM (test code = 8.2 mg/dL    8.6-10.6     L            



             7309153303)                                         

 

             eGFR (test code =              mL/min/1.73m2              



             6233769469)                                         

 

             MAL (test code = MAL) Association of                           



                          Glomerular Filtration                           



                          Rate (GFR) and Staging                           



                          of Kidney Disease*                           



                          +---------------------                           



                          --+-------------------                           



                          --+-------------------                           



                          ------+| GFR                           



                          (mL/min/1.73 m2) ?|                           



                          With Kidney Damage ?|                           



                          ?Without Kidney                           



                          Damage+---------------                           



                          --------+-------------                           



                          --------+-------------                           



                          ------------+| ?>90 ?                           



                          ? ? ? ? ? ? ? ?|                           



                          ?Stage one ? ? ? ? ?|                           



                          ? Normal ? ? ? ? ? ? ?                           



                          ?+--------------------                           



                          ---+------------------                           



                          ---+------------------                           



                          -------+| ?60-89 ? ? ?                           



                          ? ? ? ? ?| ?Stage two                           



                          ? ? ? ? ?| ? Decreased                           



                          GFR ? ? ? ?                            



                          +---------------------                           



                          --+-------------------                           



                          --+-------------------                           



                          ------+| ?30-59 ? ? ?                           



                          ? ? ? ? ?| ?Stage                           



                          three ? ? ? ?| ? Stage                           



                          three ? ? ? ? ?                           



                          +---------------------                           



                          --+-------------------                           



                          --+-------------------                           



                          ------+| ?15-29 ? ? ?                           



                          ? ? ? ? ?| ?Stage four                           



                          ? ? ? ? | ? Stage four                           



                          ? ? ? ? ?                              



                          ?+--------------------                           



                          ---+------------------                           



                          ---+------------------                           



                          -------+| ?<15 (or                           



                          dialysis) ? ?| ?Stage                           



                          five ? ? ? ? | ? Stage                           



                          five ? ? ? ? ?                           



                          ?+--------------------                           



                          ---+------------------                           



                          ---+------------------                           



                          -------+ *Each stage                           



                          assumes the associated                           



                          GFR level has been in                           



                          effect for at least                           



                          three months. ?Stages                           



                          1 to 5, with or                           



                          without kidney                           



                          disease, indicate                           



                          chronic kidney                           



                          disease. Notes:                           



                          Determination of                           



                          stages one and two                           



                          (with eGFR                             



                          >59mL/min/1.73 m2)                           



                          requires estimation of                           



                          kidney damage for at                           



                          least three months as                           



                          defined by structural                           



                          or functional                           



                          abnormalities of the                           



                          kidney, manifested by                           



                          either:Pathological                           



                          abnormalities or                           



                          Markers of kidney                           



                          damage (including                           



                          abnormalities in the                           



                          composition of the                           



                          blood or urine or                           



                          abnormalities in                           



                          imaging tests).                           

 

             Lab Interpretation Abnormal                               



             (test code = 18103-4)                                        



[facility] GLUCOSE (AUTOMATED)2022 12:36:29





             Test Item    Value        Reference Range Interpretation Comments

 

             POCT GLU (test code = 8365573900) 276 mg/dL           H      

      

 

             Lab Interpretation (test code = Abnormal                           

    



             81659-0)                                            



[facility] GLUCOSE (AUTOMATED)2022 12:36:29





             Test Item    Value        Reference Range Interpretation Comments

 

             POCT GLU (test code = 9839039154) 276 mg/dL           H      

      

 

             Lab Interpretation (test code = Abnormal                           

    



             48458-1)                                            



[facility]





             Test Item    Value        Reference Range Interpretation Comments

 

             POCT GLU (test code = 7838815919) 289 mg/dL           H      

      

 

             Lab Interpretation (test code = Abnormal                           

    



             01449-6)                                            



[facility] GLUCOSE (AUTOMATED)2022 01:29:51





             Test Item    Value        Reference Range Interpretation Comments

 

             POCT GLU (test code = 1668377897) 289 mg/dL           H      

      

 

             Lab Interpretation (test code = Abnormal                           

    



             90905-3)                                            



[facility] GLUCOSE (AUTOMATED)2022 21:24:38





             Test Item    Value        Reference Range Interpretation Comments

 

             POCT GLU (test code = 3190780947) 173 mg/dL           H      

      

 

             Lab Interpretation (test code = Abnormal                           

    



             19659-9)                                            



[facility] GLUCOSE (AUTOMATED)2022 21:24:38





             Test Item    Value        Reference Range Interpretation Comments

 

             POCT GLU (test code = 6726606285) 173 mg/dL           H      

      

 

             Lab Interpretation (test code = Abnormal                           

    



             29755-0)                                            



[facility]49





             Test Item    Value        Reference Range Interpretation Comments

 

             POCT GLU (test code = 6563881201) 279 mg/dL           H      

      

 

             Lab Interpretation (test code = Abnormal                           

    



             85538-6)                                            



[facility] GLUCOSE (AUTOMATED)2022 16:50:49





             Test Item    Value        Reference Range Interpretation Comments

 

             POCT GLU (test code = 4924089360) 279 mg/dL           H      

      

 

             Lab Interpretation (test code = Abnormal                           

    



             69931-7)                                            



[facility] 13[facility]31[facility]23





             Test Item    Value        Reference Range Interpretation Comments

 

             POCT GLU (test code = 8380088578) 281 mg/dL           H      

      

 

             Lab Interpretation (test code = Abnormal                           

    



             63985-3)                                            



[facility] 13[facility]31:23





             Test Item    Value        Reference Range Interpretation Comments

 

             POCT GLU (test code = 4671566605) 281 mg/dL           H      

      

 

             Lab Interpretation (test code = Abnormal                           

    



             78943-8)                                            



[facility] GLUCOSE (AUTOMATED)2022 10:02:35





             Test Item    Value        Reference Range Interpretation Comments

 

             POCT GLU (test code = 4389267653) 339 mg/dL           H      

      

 

             Lab Interpretation (test code = Abnormal                           

    



             95747-3)                                            



[facility] GLUCOSE (AUTOMATED)2022 10:02:35





             Test Item    Value        Reference Range Interpretation Comments

 

             POCT GLU (test code = 2685024185) 339 mg/dL           H      

      

 

             Lab Interpretation (test code = Abnormal                           

    



             60602-7)                                            



[facility] GLUCOSE (AUTOMATED)2022 06:33:22





             Test Item    Value        Reference Range Interpretation Comments

 

             POCT GLU (test code = 9855678935) 295 mg/dL           H      

      

 

             Lab Interpretation (test code = Abnormal                           

    



             03653-8)                                            



[facility] GLUCOSE (AUTOMATED)2022 06:33:22





             Test Item    Value        Reference Range Interpretation Comments

 

             POCT GLU (test code = 3644979426) 295 mg/dL           H      

      

 

             Lab Interpretation (test code = Abnormal                           

    



             83201-7)                                            



[facility] GLUCOSE (AUTOMATED)2022 01:54:18





             Test Item    Value        Reference Range Interpretation Comments

 

             POCT GLU (test code = 7367331942) 258 mg/dL           H      

      

 

             Lab Interpretation (test code = Abnormal                           

    



             96984-7)                                            



[facility] GLUCOSE (AUTOMATED)2022 01:54:18





             Test Item    Value        Reference Range Interpretation Comments

 

             POCT GLU (test code = 5315916368) 258 mg/dL           H      

      

 

             Lab Interpretation (test code = Abnormal                           

    



             87883-4)                                            



[facility] GLUCOSE (AUTOMATED)2022 23:03:09





             Test Item    Value        Reference Range Interpretation Comments

 

             POCT GLU (test code = 5414578521) 286 mg/dL           H      

      

 

             Lab Interpretation (test code = Abnormal                           

    



             53988-0)                                            



[facility] GLUCOSE (AUTOMATED)2022 23:03:09





             Test Item    Value        Reference Range Interpretation Comments

 

             POCT GLU (test code = 2169341188) 286 mg/dL           H      

      

 

             Lab Interpretation (test code = Abnormal                           

    



             42470-7)                                            



[facility] GLUCOSE (AUTOMATED)2022 20:48:08





             Test Item    Value        Reference Range Interpretation Comments

 

             POCT GLU (test code = 9402212366) 249 mg/dL           H      

      

 

             Lab Interpretation (test code = Abnormal                           

    



             94138-9)                                            



[facility] GLUCOSE (AUTOMATED)2022 20:48:08





             Test Item    Value        Reference Range Interpretation Comments

 

             POCT GLU (test code = 2692686991) 249 mg/dL           H      

      

 

             Lab Interpretation (test code = Abnormal                           

    



             16177-5)                                            



[id number] 17:01:47





             Test Item    Value        Reference Range Interpretation Comments

 

             BOH (test code = 1.0 mmol/L                             



             2683240586)                                         

 

             MAL (test code = Normal Ranges: ? ?                           



             MAL)         Nonfasting ? ? ? ? ? Less                           



                          than 0.1 mmol/L ? ?                           



                          Overnight Fast ? ? ? Less                           



                          than 0.4 mmol/L ? ? Fasting                           



                          (1-2 weeks) ?6-8 mmol/L Test                          

 



                          developed and                           



                          characteristics determined                           



                          by [facility].                          

 



[id number] 17:01:47





             Test Item    Value        Reference Range Interpretation Comments

 

             BOH (test code = 1.0 mmol/L                             



             4178179054)                                         

 

             MAL (test code = Normal Ranges: ? ?                           



             MAL)         Nonfasting ? ? ? ? ? Less                           



                          than 0.1 mmol/L ? ?                           



                          Overnight Fast ? ? ? Less                           



                          than 0.4 mmol/L ? ? Fasting                           



                          (1-2 weeks) ?6-8 mmol/L Test                          

 



                          developed and                           



                          characteristics determined                           



                          by [facility].                          

 



[id number] 17:01:47





             Test Item    Value        Reference Range Interpretation Comments

 

             BOH (test code = 1.0 mmol/L                             



             4625773517)                                         

 

             MAL (test code = Normal Ranges: ? ?                           



             MAL)         Nonfasting ? ? ? ? ? Less                           



                          than 0.1 mmol/L ? ?                           



                          Overnight Fast ? ? ? Less                           



                          than 0.4 mmol/L ? ? Fasting                           



                          (1-2 weeks) ?6-8 mmol/L Test                          

 



                          developed and                           



                          characteristics determined                           



                          by [facility].                          

 



[facility] GLUCOSE (AUTOMATED)2022 16:42:59





             Test Item    Value        Reference Range Interpretation Comments

 

             POCT GLU (test code = 9110239118) 264 mg/dL           H      

      

 

             Lab Interpretation (test code = Abnormal                           

    



             80861-4)                                            



[facility] GLUCOSE (AUTOMATED)2022 16:42:59





             Test Item    Value        Reference Range Interpretation Comments

 

             POCT GLU (test code = 1826423728) 264 mg/dL           H      

      

 

             Lab Interpretation (test code = Abnormal                           

    



             83777-8)                                            



[facility] Metabolic Panel (Na, K, Cl, CO2, 
Glucose, BUN, Creatinine, Ca)2022 16:16:20





             Test Item    Value        Reference Range Interpretation Comments

 

             NA (test code = 137 mmol/L   135-145                   



             1956973052)                                         

 

             K (test code = 3.5 mmol/L   3.5-5                     



             3630284142)                                         

 

             CL (test code = 115 mmol/L          H            



             1385253047)                                         

 

             CO2 TOTAL (test code = 17 mmol/L    23-31        L            



             4546775109)                                         

 

             AGAP (test code =              2-16                      



             5055677385)                                         

 

             BUN (test code = 5 mg/dL      7-23         L            



             3965399072)                                         

 

             GLUCOSE (test code = 271 mg/dL           H            



             2172636021)                                         

 

             CREATININE (test code = 0.45 mg/dL   0.6-1.25     L            



             8921343457)                                         

 

             CALCIUM (test code = 8.5 mg/dL    8.6-10.6     L            



             3037542881)                                         

 

             eGFR (test code =              mL/min/1.73m2              



             7669596199)                                         

 

             MAL (test code = MAL) Association of                           



                          Glomerular Filtration                           



                          Rate (GFR) and Staging                           



                          of Kidney Disease*                           



                          +---------------------                           



                          --+-------------------                           



                          --+-------------------                           



                          ------+| GFR                           



                          (mL/min/1.73 m2) ?|                           



                          With Kidney Damage ?|                           



                          ?Without Kidney                           



                          Damage+---------------                           



                          --------+-------------                           



                          --------+-------------                           



                          ------------+| ?>90 ?                           



                          ? ? ? ? ? ? ? ?|                           



                          ?Stage one ? ? ? ? ?|                           



                          ? Normal ? ? ? ? ? ? ?                           



                          ?+--------------------                           



                          ---+------------------                           



                          ---+------------------                           



                          -------+| ?60-89 ? ? ?                           



                          ? ? ? ? ?| ?Stage two                           



                          ? ? ? ? ?| ? Decreased                           



                          GFR ? ? ? ?                            



                          +---------------------                           



                          --+-------------------                           



                          --+-------------------                           



                          ------+| ?30-59 ? ? ?                           



                          ? ? ? ? ?| ?Stage                           



                          three ? ? ? ?| ? Stage                           



                          three ? ? ? ? ?                           



                          +---------------------                           



                          --+-------------------                           



                          --+-------------------                           



                          ------+| ?15-29 ? ? ?                           



                          ? ? ? ? ?| ?Stage four                           



                          ? ? ? ? | ? Stage four                           



                          ? ? ? ? ?                              



                          ?+--------------------                           



                          ---+------------------                           



                          ---+------------------                           



                          -------+| ?<15 (or                           



                          dialysis) ? ?| ?Stage                           



                          five ? ? ? ? | ? Stage                           



                          five ? ? ? ? ?                           



                          ?+--------------------                           



                          ---+------------------                           



                          ---+------------------                           



                          -------+ *Each stage                           



                          assumes the associated                           



                          GFR level has been in                           



                          effect for at least                           



                          three months. ?Stages                           



                          1 to 5, with or                           



                          without kidney                           



                          disease, indicate                           



                          chronic kidney                           



                          disease. Notes:                           



                          Determination of                           



                          stages one and two                           



                          (with eGFR                             



                          >59mL/min/1.73 m2)                           



                          requires estimation of                           



                          kidney damage for at                           



                          least three months as                           



                          defined by structural                           



                          or functional                           



                          abnormalities of the                           



                          kidney, manifested by                           



                          either:Pathological                           



                          abnormalities or                           



                          Markers of kidney                           



                          damage (including                           



                          abnormalities in the                           



                          composition of the                           



                          blood or urine or                           



                          abnormalities in                           



                          imaging tests).                           

 

             Lab Interpretation Abnormal                               



             (test code = 94302-9)                                        



[facility] Metabolic Panel (Na, K, Cl, CO2, 
Glucose, BUN, Creatinine, Ca)2022 16:16:20





             Test Item    Value        Reference Range Interpretation Comments

 

             NA (test code = 137 mmol/L   135-145                   



             2942361609)                                         

 

             K (test code = 3.5 mmol/L   3.5-5                     



             2364044767)                                         

 

             CL (test code = 115 mmol/L          H            



             1737289495)                                         

 

             CO2 TOTAL (test code = 17 mmol/L    23-31        L            



             2611366585)                                         

 

             AGAP (test code =              2-16                      



             6016769990)                                         

 

             BUN (test code = 5 mg/dL      7-23         L            



             9435469533)                                         

 

             GLUCOSE (test code = 271 mg/dL           H            



             0414063365)                                         

 

             CREATININE (test code = 0.45 mg/dL   0.6-1.25     L            



             9333789519)                                         

 

             CALCIUM (test code = 8.5 mg/dL    8.6-10.6     L            



             6995891664)                                         

 

             eGFR (test code =              mL/min/1.73m2              



             1711526138)                                         

 

             MAL (test code = MAL) Association of                           



                          Glomerular Filtration                           



                          Rate (GFR) and Staging                           



                          of Kidney Disease*                           



                          +---------------------                           



                          --+-------------------                           



                          --+-------------------                           



                          ------+| GFR                           



                          (mL/min/1.73 m2) ?|                           



                          With Kidney Damage ?|                           



                          ?Without Kidney                           



                          Damage+---------------                           



                          --------+-------------                           



                          --------+-------------                           



                          ------------+| ?>90 ?                           



                          ? ? ? ? ? ? ? ?|                           



                          ?Stage one ? ? ? ? ?|                           



                          ? Normal ? ? ? ? ? ? ?                           



                          ?+--------------------                           



                          ---+------------------                           



                          ---+------------------                           



                          -------+| ?60-89 ? ? ?                           



                          ? ? ? ? ?| ?Stage two                           



                          ? ? ? ? ?| ? Decreased                           



                          GFR ? ? ? ?                            



                          +---------------------                           



                          --+-------------------                           



                          --+-------------------                           



                          ------+| ?30-59 ? ? ?                           



                          ? ? ? ? ?| ?Stage                           



                          three ? ? ? ?| ? Stage                           



                          three ? ? ? ? ?                           



                          +---------------------                           



                          --+-------------------                           



                          --+-------------------                           



                          ------+| ?15-29 ? ? ?                           



                          ? ? ? ? ?| ?Stage four                           



                          ? ? ? ? | ? Stage four                           



                          ? ? ? ? ?                              



                          ?+--------------------                           



                          ---+------------------                           



                          ---+------------------                           



                          -------+| ?<15 (or                           



                          dialysis) ? ?| ?Stage                           



                          five ? ? ? ? | ? Stage                           



                          five ? ? ? ? ?                           



                          ?+--------------------                           



                          ---+------------------                           



                          ---+------------------                           



                          -------+ *Each stage                           



                          assumes the associated                           



                          GFR level has been in                           



                          effect for at least                           



                          three months. ?Stages                           



                          1 to 5, with or                           



                          without kidney                           



                          disease, indicate                           



                          chronic kidney                           



                          disease. Notes:                           



                          Determination of                           



                          stages one and two                           



                          (with eGFR                             



                          >59mL/min/1.73 m2)                           



                          requires estimation of                           



                          kidney damage for at                           



                          least three months as                           



                          defined by structural                           



                          or functional                           



                          abnormalities of the                           



                          kidney, manifested by                           



                          either:Pathological                           



                          abnormalities or                           



                          Markers of kidney                           



                          damage (including                           



                          abnormalities in the                           



                          composition of the                           



                          blood or urine or                           



                          abnormalities in                           



                          imaging tests).                           

 

             Lab Interpretation Abnormal                               



             (test code = 40303-7)                                        



[facility] GLUCOSE (AUTOMATED)2022 14:21:27





             Test Item    Value        Reference Range Interpretation Comments

 

             POCT GLU (test code = 4429928768) 286 mg/dL           H      

      

 

             Lab Interpretation (test code = Abnormal                           

    



             19542-6)                                            



[facility] GLUCOSE (AUTOMATED)2022 14:21:27





             Test Item    Value        Reference Range Interpretation Comments

 

             POCT GLU (test code = 1955077625) 286 mg/dL           H      

      

 

             Lab Interpretation (test code = Abnormal                           

    



             71811-1)                                            



[facility] 
[id number] 13:29:25





             Test Item    Value        Reference Range Interpretation Comments

 

             Coagulase Negative Positive     Negative, See A            



             Staphylococcus (test              Comment/Narrative              



             code = 09490-7)                                        

 

             MAL (test code = MAL)  Coagulase negative                          

 



                          Staphylococcus (CoNS)                           



                          detected by DNA probe.                           



                          ?CoNS often                            



                          contaminate blood                           



                          cultures from skin                           



                          colonization during                           



                          phlebotomy. ?Preferred                           



                          management is to                           



                          repeat blood cultures,                           



                          and monitor off                           



                          antibiotics.                           



                          ?Contamination is                           



                          suggested by culture                           



                          growth after 48 hours,                           



                          or growth in single                           



                          culture (i.e., one of                           



                          two sets). ?True                           



                          bacteremia is                           



                          suggested by the                           



                          fever, hypotension,                           



                          and leukocytosis that                           



                          are not explained by                           



                          an alternative                           



                          infection, or                           



                          indwelling foreign                           



                          devices that appear                           



                          infected (catheters,                           



                          lines, or prostheses).                           



                          Consider Infectious                           



                          Diseases consultation                           



                          if differentiation of                           



                          CoNS bacteremia from                           



                          contamination is                           



                          uncertain. If clinical                           



                          context suggests true                           



                          bacteremia, preferred                           



                          therapy is vancomycin.                           



                          Please contact the                           



                          Antimicrobial                           



                          Stewardship Program                           



                          with questions.Pager:                           



                          [phone] Testing                           



                          included eleven                           



                          identification and                           



                          three resistance                           



                          marker                                 



                          targets.______________                           



                          ______________________                           



                          ______________________                           



                          _____________                           

 

             Lab Interpretation Abnormal                               



             (test code = 39151-1)                                        



[facility] POSITIVE BLOOD PATHOGENS DNA 
[id number] 13:29:25





             Test Item    Value        Reference Range Interpretation Comments

 

             Coagulase Negative Positive     Negative, See A            



             Staphylococcus (test              Comment/Narrative              



             code = 40882-9)                                        

 

             MAL (test code = MAL)  Coagulase negative                          

 



                          Staphylococcus (CoNS)                           



                          detected by DNA probe.                           



                          ?CoNS often                            



                          contaminate blood                           



                          cultures from skin                           



                          colonization during                           



                          phlebotomy. ?Preferred                           



                          management is to                           



                          repeat blood cultures,                           



                          and monitor off                           



                          antibiotics.                           



                          ?Contamination is                           



                          suggested by culture                           



                          growth after 48 hours,                           



                          or growth in single                           



                          culture (i.e., one of                           



                          two sets). ?True                           



                          bacteremia is                           



                          suggested by the                           



                          fever, hypotension,                           



                          and leukocytosis that                           



                          are not explained by                           



                          an alternative                           



                          infection, or                           



                          indwelling foreign                           



                          devices that appear                           



                          infected (catheters,                           



                          lines, or prostheses).                           



                          Consider Infectious                           



                          Diseases consultation                           



                          if differentiation of                           



                          CoNS bacteremia from                           



                          contamination is                           



                          uncertain. If clinical                           



                          context suggests true                           



                          bacteremia, preferred                           



                          therapy is vancomycin.                           



                          Please contact the                           



                          Antimicrobial                           



                          Stewardship Program                           



                          with questions.Pager:                           



                          [phone] Testing                           



                          included eleven                           



                          identification and                           



                          three resistance                           



                          marker                                 



                          targets.______________                           



                          ______________________                           



                          ______________________                           



                          _____________                           

 

             Lab Interpretation Abnormal                               



             (test code = 41619-1)                                        



[facility] POSITIVE BLOOD PATHOGENS DNA 
[id number] 13:29:25





             Test Item    Value        Reference Range Interpretation Comments

 

             Coagulase Negative Positive     Negative, See A            



             Staphylococcus (test              Comment/Narrative              



             code = 40196-2)                                        

 

             MAL (test code = MAL)  Coagulase negative                          

 



                          Staphylococcus (CoNS)                           



                          detected by DNA probe.                           



                          ?CoNS often                            



                          contaminate blood                           



                          cultures from skin                           



                          colonization during                           



                          phlebotomy. ?Preferred                           



                          management is to                           



                          repeat blood cultures,                           



                          and monitor off                           



                          antibiotics.                           



                          ?Contamination is                           



                          suggested by culture                           



                          growth after 48 hours,                           



                          or growth in single                           



                          culture (i.e., one of                           



                          two sets). ?True                           



                          bacteremia is                           



                          suggested by the                           



                          fever, hypotension,                           



                          and leukocytosis that                           



                          are not explained by                           



                          an alternative                           



                          infection, or                           



                          indwelling foreign                           



                          devices that appear                           



                          infected (catheters,                           



                          lines, or prostheses).                           



                          Consider Infectious                           



                          Diseases consultation                           



                          if differentiation of                           



                          CoNS bacteremia from                           



                          contamination is                           



                          uncertain. If clinical                           



                          context suggests true                           



                          bacteremia, preferred                           



                          therapy is vancomycin.                           



                          Please contact the                           



                          Antimicrobial                           



                          Stewardship Program                           



                          with questions.Pager:                           



                          [phone] Testing                           



                          included eleven                           



                          identification and                           



                          three resistance                           



                          marker                                 



                          targets.______________                           



                          ______________________                           



                          ______________________                           



                          _____________                           

 

             Lab Interpretation Abnormal                               



             (test code = 95661-7)                                        



[facility] GLUCOSE (AUTOMATED)2022 13:03:22





             Test Item    Value        Reference Range Interpretation Comments

 

             POCT GLU (test code = 5199953824) 307 mg/dL           H      

      

 

             Lab Interpretation (test code = Abnormal                           

    



             81883-4)                                            



[facility] GLUCOSE (AUTOMATED)2022 13:03:22





             Test Item    Value        Reference Range Interpretation Comments

 

             POCT GLU (test code = 7575445998) 307 mg/dL           H      

      

 

             Lab Interpretation (test code = Abnormal                           

    



             87538-9)                                            



[facility] GLUCOSE (AUTOMATED)2022 09:05:19





             Test Item    Value        Reference Range Interpretation Comments

 

             POCT GLU (test code = 4292343885) 326 mg/dL           H      

      

 

             Lab Interpretation (test code = Abnormal                           

    



             33336-0)                                            



[facility] GLUCOSE (AUTOMATED)2022 09:05:19





             Test Item    Value        Reference Range Interpretation Comments

 

             POCT GLU (test code = 8870873295) 326 mg/dL           H      

      

 

             Lab Interpretation (test code = Abnormal                           

    



             04430-9)                                            



[facility] GLUCOSE (AUTOMATED)2022 05:46:58





             Test Item    Value        Reference Range Interpretation Comments

 

             POCT GLU (test code = 4391726410) 341 mg/dL           H      

      

 

             Lab Interpretation (test code = Abnormal                           

    



             20594-3)                                            



[facility] GLUCOSE (AUTOMATED)2022 05:46:58





             Test Item    Value        Reference Range Interpretation Comments

 

             POCT GLU (test code = 3364977289) 341 mg/dL           H      

      

 

             Lab Interpretation (test code = Abnormal                           

    



             53767-5)                                            



[facility] GLUCOSE (AUTOMATED)2022 01:37:20





             Test Item    Value        Reference Range Interpretation Comments

 

             POCT GLU (test code = 0495643114) 196 mg/dL           H      

      

 

             Lab Interpretation (test code = Abnormal                           

    



             34644-4)                                            



[facility] GLUCOSE (AUTOMATED)2022 01:37:20





             Test Item    Value        Reference Range Interpretation Comments

 

             POCT GLU (test code = 4458662099) 196 mg/dL           H      

      

 

             Lab Interpretation (test code = Abnormal                           

    



             15371-3)                                            



[facility] GLUCOSE (AUTOMATED)2022 23:44:41





             Test Item    Value        Reference Range Interpretation Comments

 

             POCT GLU (test code = 3290908409) 138 mg/dL           H      

      

 

             Lab Interpretation (test code = Abnormal                           

    



             66838-1)                                            



[facility] GLUCOSE (AUTOMATED)2022 23:44:41





             Test Item    Value        Reference Range Interpretation Comments

 

             POCT GLU (test code = 2721967454) 138 mg/dL           H      

      

 

             Lab Interpretation (test code = Abnormal                           

    



             78261-4)                                            



[facility] Metabolic Panel (Na, K, Cl, CO2, 
Glucose, BUN, Creatinine, Ca)2022 23:12:45





             Test Item    Value        Reference Range Interpretation Comments

 

             NA (test code = 138 mmol/L   135-145                   



             3796672782)                                         

 

             K (test code = 3.7 mmol/L   3.5-5                     



             1103822972)                                         

 

             CL (test code = 115 mmol/L          H            



             4098744030)                                         

 

             CO2 TOTAL (test code = 17 mmol/L    23-31        L            



             3051228201)                                         

 

             AGAP (test code =              2-16                      



             9193803329)                                         

 

             BUN (test code = 5 mg/dL      7-23         L            



             8052584216)                                         

 

             GLUCOSE (test code = 86 mg/dL                         



             4262900892)                                         

 

             CREATININE (test code = 0.46 mg/dL   0.6-1.25     L            



             2475878784)                                         

 

             CALCIUM (test code = 8.4 mg/dL    8.6-10.6     L            



             0218587723)                                         

 

             eGFR (test code =              mL/min/1.73m2              



             3816903813)                                         

 

             MAL (test code = MAL) Association of                           



                          Glomerular Filtration                           



                          Rate (GFR) and Staging                           



                          of Kidney Disease*                           



                          +---------------------                           



                          --+-------------------                           



                          --+-------------------                           



                          ------+| GFR                           



                          (mL/min/1.73 m2) ?|                           



                          With Kidney Damage ?|                           



                          ?Without Kidney                           



                          Damage+---------------                           



                          --------+-------------                           



                          --------+-------------                           



                          ------------+| ?>90 ?                           



                          ? ? ? ? ? ? ? ?|                           



                          ?Stage one ? ? ? ? ?|                           



                          ? Normal ? ? ? ? ? ? ?                           



                          ?+--------------------                           



                          ---+------------------                           



                          ---+------------------                           



                          -------+| ?60-89 ? ? ?                           



                          ? ? ? ? ?| ?Stage two                           



                          ? ? ? ? ?| ? Decreased                           



                          GFR ? ? ? ?                            



                          +---------------------                           



                          --+-------------------                           



                          --+-------------------                           



                          ------+| ?30-59 ? ? ?                           



                          ? ? ? ? ?| ?Stage                           



                          three ? ? ? ?| ? Stage                           



                          three ? ? ? ? ?                           



                          +---------------------                           



                          --+-------------------                           



                          --+-------------------                           



                          ------+| ?15-29 ? ? ?                           



                          ? ? ? ? ?| ?Stage four                           



                          ? ? ? ? | ? Stage four                           



                          ? ? ? ? ?                              



                          ?+--------------------                           



                          ---+------------------                           



                          ---+------------------                           



                          -------+| ?<15 (or                           



                          dialysis) ? ?| ?Stage                           



                          five ? ? ? ? | ? Stage                           



                          five ? ? ? ? ?                           



                          ?+--------------------                           



                          ---+------------------                           



                          ---+------------------                           



                          -------+ *Each stage                           



                          assumes the associated                           



                          GFR level has been in                           



                          effect for at least                           



                          three months. ?Stages                           



                          1 to 5, with or                           



                          without kidney                           



                          disease, indicate                           



                          chronic kidney                           



                          disease. Notes:                           



                          Determination of                           



                          stages one and two                           



                          (with eGFR                             



                          >59mL/min/1.73 m2)                           



                          requires estimation of                           



                          kidney damage for at                           



                          least three months as                           



                          defined by structural                           



                          or functional                           



                          abnormalities of the                           



                          kidney, manifested by                           



                          either:Pathological                           



                          abnormalities or                           



                          Markers of kidney                           



                          damage (including                           



                          abnormalities in the                           



                          composition of the                           



                          blood or urine or                           



                          abnormalities in                           



                          imaging tests).                           

 

             Lab Interpretation Abnormal                               



             (test code = 07023-3)                                        



[facility]Na, K, Cl, CO2, 
Glucose, BUN, Creatinine, Ca)2022 23:12:45





             Test Item    Value        Reference Range Interpretation Comments

 

             NA (test code = 138 mmol/L   135-145                   



             [id number])                                         

 

             K (test code = 3.7 mmol/L   3.5-5                     



             7265155741)                                         

 

             CL (test code = 115 mmol/L          H            



             4103521186)                                         

 

             CO2 TOTAL (test code = 17 mmol/L    23-31        L            



             6873872837)                                         

 

             AGAP (test code =              2-16                      



             0922505130)                                         

 

             BUN (test code = 5 mg/dL      7-23         L            



             3485374199)                                         

 

             GLUCOSE (test code = 86 mg/dL                         



             5638983909)                                         

 

             CREATININE (test code = 0.46 mg/dL   0.6-1.25     L            



             5111336581)                                         

 

             CALCIUM (test code = 8.4 mg/dL    8.6-10.6     L            



             6287628995)                                         

 

             eGFR (test code =              mL/min/1.73m2              



             4555396043)                                         

 

             MAL (test code = MAL) Association of                           



                          Glomerular Filtration                           



                          Rate (GFR) and Staging                           



                          of Kidney Disease*                           



                          +---------------------                           



                          --+-------------------                           



                          --+-------------------                           



                          ------+| GFR                           



                          (mL/min/1.73 m2) ?|                           



                          With Kidney Damage ?|                           



                          ?Without Kidney                           



                          Damage+---------------                           



                          --------+-------------                           



                          --------+-------------                           



                          ------------+| ?>90 ?                           



                          ? ? ? ? ? ? ? ?|                           



                          ?Stage one ? ? ? ? ?|                           



                          ? Normal ? ? ? ? ? ? ?                           



                          ?+--------------------                           



                          ---+------------------                           



                          ---+------------------                           



                          -------+| ?60-89 ? ? ?                           



                          ? ? ? ? ?| ?Stage two                           



                          ? ? ? ? ?| ? Decreased                           



                          GFR ? ? ? ?                            



                          +---------------------                           



                          --+-------------------                           



                          --+-------------------                           



                          ------+| ?30-59 ? ? ?                           



                          ? ? ? ? ?| ?Stage                           



                          three ? ? ? ?| ? Stage                           



                          three ? ? ? ? ?                           



                          +---------------------                           



                          --+-------------------                           



                          --+-------------------                           



                          ------+| ?15-29 ? ? ?                           



                          ? ? ? ? ?| ?Stage four                           



                          ? ? ? ? | ? Stage four                           



                          ? ? ? ? ?                              



                          ?+--------------------                           



                          ---+------------------                           



                          ---+------------------                           



                          -------+| ?<15 (or                           



                          dialysis) ? ?| ?Stage                           



                          five ? ? ? ? | ? Stage                           



                          five ? ? ? ? ?                           



                          ?+--------------------                           



                          ---+------------------                           



                          ---+------------------                           



                          -------+ *Each stage                           



                          assumes the associated                           



                          GFR level has been in                           



                          effect for at least                           



                          three months. ?Stages                           



                          1 to 5, with or                           



                          without kidney                           



                          disease, indicate                           



                          chronic kidney                           



                          disease. Notes:                           



                          Determination of                           



                          stages one and two                           



                          (with eGFR                             



                          >59mL/min/1.73 m2)                           



                          requires estimation of                           



                          kidney damage for at                           



                          least three months as                           



                          defined by structural                           



                          or functional                           



                          abnormalities of the                           



                          kidney, manifested by                           



                          either:Pathological                           



                          abnormalities or                           



                          Markers of kidney                           



                          damage (including                           



                          abnormalities in the                           



                          composition of the                           



                          blood or urine or                           



                          abnormalities in                           



                          imaging tests).                           

 

             Lab Interpretation Abnormal                               



             (test code = 43470-8)                                        



[facility] GLUCOSE (AUTOMATED)2022 22:38:52





             Test Item    Value        Reference Range Interpretation Comments

 

             POCT GLU (test code = 5725262470) 94 mg/dL                   

      

 

             Lab Interpretation (test code = Normal                             

    



             59204-4)                                            



[facility] GLUCOSE (AUTOMATED)2022 22:38:52





             Test Item    Value        Reference Range Interpretation Comments

 

             POCT GLU (test code = 3409109055) 94 mg/dL                   

      

 

             Lab Interpretation (test code = Normal                             

    



             55956-4)                                            



[facility] GLUCOSE (AUTOMATED)2022 21:31:35





             Test Item    Value        Reference Range Interpretation Comments

 

             POCT GLU (test code = 9871914370) 119 mg/dL           H      

      

 

             Lab Interpretation (test code = Abnormal                           

    



             36893-3)                                            



[facility] GLUCOSE (AUTOMATED)2022 21:31:35





             Test Item    Value        Reference Range Interpretation Comments

 

             POCT GLU (test code = 2012129964) 119 mg/dL           H      

      

 

             Lab Interpretation (test code = Abnormal                           

    



             67402-0)                                            



[facility] GLUCOSE (AUTOMATED)2022 20:06:03





             Test Item    Value        Reference Range Interpretation Comments

 

             POCT GLU (test code = 3403234226) 177 mg/dL           H      

      

 

             Lab Interpretation (test code = Abnormal                           

    



             80411-8)                                            



[facility] GLUCOSE (AUTOMATED)2022 20:06:03





             Test Item    Value        Reference Range Interpretation Comments

 

             POCT GLU (test code = 9443887196) 177 mg/dL           H      

      

 

             Lab Interpretation (test code = Abnormal                           

    



             80363-4)                                            



[facility] GLUCOSE (AUTOMATED)2022 19:07:20





             Test Item    Value        Reference Range Interpretation Comments

 

             POCT GLU (test code = 2449872134) 185 mg/dL           H      

      

 

             Lab Interpretation (test code = Abnormal                           

    



             72339-7)                                            



[facility] GLUCOSE (AUTOMATED)2022 19:07:20





             Test Item    Value        Reference Range Interpretation Comments

 

             POCT GLU (test code = 6342557745) 185 mg/dL           H      

      

 

             Lab Interpretation (test code = Abnormal                           

    



             28234-3)                                            



[facility]





             Test Item    Value        Reference    Interpretation Comments



                                       Range                     

 

             TROPONIN I (test 0.000 ng/mL  See_Comment                [Automated



             code = 4946541793)                                        message] 

The



                                                                 system which



                                                                 generated this



                                                                 result



                                                                 transmitted



                                                                 reference range

:



                                                                 <=0.034. The



                                                                 reference range



                                                                 was not used to



                                                                 interpret this



                                                                 result as



                                                                 normal/abnormal

.

 

             MAL (test code = Reference (Normal)                           



             MAL)         Range (defined by                           



                          the 99th percentile                           



                          reference limit): <=                           



                          0.034 ng/mL Note:                           



                          Cardiac troponin                           



                          begins to rise 3-4                           



                          hours after the                           



                          onset of ischemia.                           



                          Repeat in 4-6 hours                           



                          if the sample was                           



                          drawn within 3-4                           



                          hours of the onset                           



                          of the symptom and                           



                          found normal.                           



                          Diagnosis of                           



                          myocardial injury is                           



                          made with acute                           



                          changes in cTn                           



                          concentrations with                           



                          at least one serial                           



                          sample above the                           



                          99th percentile                           



                          upper reference                           



                          limit (URL), taken                           



                          together with the                           



                          patient's clinical                           



                          presentation. Biotin                           



                          has been reported to                           



                          cause a negative                           



                          bias, interpret                           



                          results relative to                           



                          patient's use of                           



                          biotin.                                

 

             Lab Interpretation Normal                                 



             (test code =                                        



             09566-8)                                            



[facility] -07-01 18:44:53





             Test Item    Value        Reference    Interpretation Comments



                                       Range                     

 

             TROPONIN I (test 0.000 ng/mL  See_Comment                [Automated



             code = 6732561706)                                        message] 

The



                                                                 system which



                                                                 generated this



                                                                 result



                                                                 transmitted



                                                                 reference range

:



                                                                 <=0.034. The



                                                                 reference range



                                                                 was not used to



                                                                 interpret this



                                                                 result as



                                                                 normal/abnormal

.

 

             MAL (test code = Reference (Normal)                           



             MAL)         Range (defined by                           



                          the 99th percentile                           



                          reference limit): <=                           



                          0.034 ng/mL Note:                           



                          Cardiac troponin                           



                          begins to rise 3-4                           



                          hours after the                           



                          onset of ischemia.                           



                          Repeat in 4-6 hours                           



                          if the sample was                           



                          drawn within 3-4                           



                          hours of the onset                           



                          of the symptom and                           



                          found normal.                           



                          Diagnosis of                           



                          myocardial injury is                           



                          made with acute                           



                          changes in cTn                           



                          concentrations with                           



                          at least one serial                           



                          sample above the                           



                          99th percentile                           



                          upper reference                           



                          limit (URL), taken                           



                          together with the                           



                          patient's clinical                           



                          presentation. Biotin                           



                          has been reported to                           



                          cause a negative                           



                          bias, interpret                           



                          results relative to                           



                          patient's use of                           



                          biotin.                                

 

             Lab Interpretation Normal                                 



             (test code =                                        



             13109-5)                                            



[facility] -23-69 18:44:53





             Test Item    Value        Reference    Interpretation Comments



                                       Range                     

 

             TROPONIN I (test 0.000 ng/mL  See_Comment                [Automated



             code = 2459011582)                                        message] 

The



                                                                 system which



                                                                 generated this



                                                                 result



                                                                 transmitted



                                                                 reference range

:



                                                                 <=0.034. The



                                                                 reference range



                                                                 was not used to



                                                                 interpret this



                                                                 result as



                                                                 normal/abnormal

.

 

             MAL (test code = Reference (Normal)                           



             MAL)         Range (defined by                           



                          the 99th percentile                           



                          reference limit): <=                           



                          0.034 ng/mL Note:                           



                          Cardiac troponin                           



                          begins to rise 3-4                           



                          hours after the                           



                          onset of ischemia.                           



                          Repeat in 4-6 hours                           



                          if the sample was                           



                          drawn within 3-4                           



                          hours of the onset                           



                          of the symptom and                           



                          found normal.                           



                          Diagnosis of                           



                          myocardial injury is                           



                          made with acute                           



                          changes in cTn                           



                          concentrations with                           



                          at least one serial                           



                          sample above the                           



                          99th percentile                           



                          upper reference                           



                          limit (URL), taken                           



                          together with the                           



                          patient's clinical                           



                          presentation. Biotin                           



                          has been reported to                           



                          cause a negative                           



                          bias, interpret                           



                          results relative to                           



                          patient's use of                           



                          biotin.                                

 

             Lab Interpretation Normal                                 



             (test code =                                        



             84690-3)                                            



[facility] Metabolic Panel (Na, K, Cl, CO2, 
Glucose, BUN, Creatinine, Ca)2022 18:27:49





             Test Item    Value        Reference Range Interpretation Comments

 

             NA (test code = 138 mmol/L   135-145                   



             [id number])                                         

 

             K (test code = 3.7 mmol/L   3.5-5                     



             1805572045)                                         

 

             CL (test code = 115 mmol/L          H            



             [id number])                                         

 

             CO2 TOTAL (test code = 16 mmol/L    23-31        L            



             7453256639)                                         

 

             AGAP (test code =              2-16                      



             [id number])                                         

 

             BUN (test code = 7 mg/dL      7-23                      



             [id number])                                         

 

             GLUCOSE (test code = 195 mg/dL           H            



             [id number])                                         

 

             CREATININE (test code = 0.52 mg/dL   0.6-1.25     L            



             [id number])                                         

 

             CALCIUM (test code = 8.2 mg/dL    8.6-10.6     L            



             6931911486)                                         

 

             eGFR (test code =              mL/min/1.73m2              



             6075064173)                                         

 

             MAL (test code = MAL) Association of                           



                          Glomerular Filtration                           



                          Rate (GFR) and Staging                           



                          of Kidney Disease*                           



                          +---------------------                           



                          --+-------------------                           



                          --+-------------------                           



                          ------+| GFR                           



                          (mL/min/1.73 m2) ?|                           



                          With Kidney Damage ?|                           



                          ?Without Kidney                           



                          Damage+---------------                           



                          --------+-------------                           



                          --------+-------------                           



                          ------------+| ?>90 ?                           



                          ? ? ? ? ? ? ? ?|                           



                          ?Stage one ? ? ? ? ?|                           



                          ? Normal ? ? ? ? ? ? ?                           



                          ?+--------------------                           



                          ---+------------------                           



                          ---+------------------                           



                          -------+| ?60-89 ? ? ?                           



                          ? ? ? ? ?| ?Stage two                           



                          ? ? ? ? ?| ? Decreased                           



                          GFR ? ? ? ?                            



                          +---------------------                           



                          --+-------------------                           



                          --+-------------------                           



                          ------+| ?30-59 ? ? ?                           



                          ? ? ? ? ?| ?Stage                           



                          three ? ? ? ?| ? Stage                           



                          three ? ? ? ? ?                           



                          +---------------------                           



                          --+-------------------                           



                          --+-------------------                           



                          ------+| ?15-29 ? ? ?                           



                          ? ? ? ? ?| ?Stage four                           



                          ? ? ? ? | ? Stage four                           



                          ? ? ? ? ?                              



                          ?+--------------------                           



                          ---+------------------                           



                          ---+------------------                           



                          -------+| ?<15 (or                           



                          dialysis) ? ?| ?Stage                           



                          five ? ? ? ? | ? Stage                           



                          five ? ? ? ? ?                           



                          ?+--------------------                           



                          ---+------------------                           



                          ---+------------------                           



                          -------+ *Each stage                           



                          assumes the associated                           



                          GFR level has been in                           



                          effect for at least                           



                          three months. ?Stages                           



                          1 to 5, with or                           



                          without kidney                           



                          disease, indicate                           



                          chronic kidney                           



                          disease. Notes:                           



                          Determination of                           



                          stages one and two                           



                          (with eGFR                             



                          >59mL/min/1.73 m2)                           



                          requires estimation of                           



                          kidney damage for at                           



                          least three months as                           



                          defined by structural                           



                          or functional                           



                          abnormalities of the                           



                          kidney, manifested by                           



                          either:Pathological                           



                          abnormalities or                           



                          Markers of kidney                           



                          damage (including                           



                          abnormalities in the                           



                          composition of the                           



                          blood or urine or                           



                          abnormalities in                           



                          imaging tests).                           

 

             Lab Interpretation Abnormal                               



             (test code = 02241-5)                                        



[facility] Metabolic Panel (Na, K, Cl, CO2, 
Glucose, BUN, Creatinine, Ca)2022 18:27:49





             Test Item    Value        Reference Range Interpretation Comments

 

             NA (test code = 138 mmol/L   135-145                   



             [id number])                                         

 

             K (test code = 3.7 mmol/L   3.5-5                     



             2185933991)                                         

 

             CL (test code = 115 mmol/L          H            



             8304219960)                                         

 

             CO2 TOTAL (test code = 16 mmol/L    23-31        L            



             5774332342)                                         

 

             AGAP (test code =              2-16                      



             3752129592)                                         

 

             BUN (test code = 7 mg/dL      7-23                      



             1988071417)                                         

 

             GLUCOSE (test code = 195 mg/dL           H            



             3400150115)                                         

 

             CREATININE (test code = 0.52 mg/dL   0.6-1.25     L            



             8206939299)                                         

 

             CALCIUM (test code = 8.2 mg/dL    8.6-10.6     L            



             9863692786)                                         

 

             eGFR (test code =              mL/min/1.73m2              



             1041400390)                                         

 

             MAL (test code = MAL) Association of                           



                          Glomerular Filtration                           



                          Rate (GFR) and Staging                           



                          of Kidney Disease*                           



                          +---------------------                           



                          --+-------------------                           



                          --+-------------------                           



                          ------+| GFR                           



                          (mL/min/1.73 m2) ?|                           



                          With Kidney Damage ?|                           



                          ?Without Kidney                           



                          Damage+---------------                           



                          --------+-------------                           



                          --------+-------------                           



                          ------------+| ?>90 ?                           



                          ? ? ? ? ? ? ? ?|                           



                          ?Stage one ? ? ? ? ?|                           



                          ? Normal ? ? ? ? ? ? ?                           



                          ?+--------------------                           



                          ---+------------------                           



                          ---+------------------                           



                          -------+| ?60-89 ? ? ?                           



                          ? ? ? ? ?| ?Stage two                           



                          ? ? ? ? ?| ? Decreased                           



                          GFR ? ? ? ?                            



                          +---------------------                           



                          --+-------------------                           



                          --+-------------------                           



                          ------+| ?30-59 ? ? ?                           



                          ? ? ? ? ?| ?Stage                           



                          three ? ? ? ?| ? Stage                           



                          three ? ? ? ? ?                           



                          +---------------------                           



                          --+-------------------                           



                          --+-------------------                           



                          ------+| ?15-29 ? ? ?                           



                          ? ? ? ? ?| ?Stage four                           



                          ? ? ? ? | ? Stage four                           



                          ? ? ? ? ?                              



                          ?+--------------------                           



                          ---+------------------                           



                          ---+------------------                           



                          -------+| ?<15 (or                           



                          dialysis) ? ?| ?Stage                           



                          five ? ? ? ? | ? Stage                           



                          five ? ? ? ? ?                           



                          ?+--------------------                           



                          ---+------------------                           



                          ---+------------------                           



                          -------+ *Each stage                           



                          assumes the associated                           



                          GFR level has been in                           



                          effect for at least                           



                          three months. ?Stages                           



                          1 to 5, with or                           



                          without kidney                           



                          disease, indicate                           



                          chronic kidney                           



                          disease. Notes:                           



                          Determination of                           



                          stages one and two                           



                          (with eGFR                             



                          >59mL/min/1.73 m2)                           



                          requires estimation of                           



                          kidney damage for at                           



                          least three months as                           



                          defined by structural                           



                          or functional                           



                          abnormalities of the                           



                          kidney, manifested by                           



                          either:Pathological                           



                          abnormalities or                           



                          Markers of kidney                           



                          damage (including                           



                          abnormalities in the                           



                          composition of the                           



                          blood or urine or                           



                          abnormalities in                           



                          imaging tests).                           

 

             Lab Interpretation Abnormal                               



             (test code = 29855-4)                                        



[facility]





             Test Item    Value        Reference Range Interpretation Comments

 

             BOH (test code = 3.3 mmol/L                             



             1703850347)                                         

 

             MAL (test code = Normal Ranges:  ? ?                           



             MAL)         Nonfasting ? ? ? ? ? Less                           



                          than 0.1 mmol/L ? ?                           



                          Overnight Fast ? ? ? Less                           



                          than 0.4 mmol/L ? ? Fasting                           



                          (1-2 weeks) ?6-8 mmol/L Test                          

 



                          developed and                           



                          characteristics determined                           



                          by [facility].                          

 



[id number]43[facility]01





             Test Item    Value        Reference Range Interpretation Comments

 

             BOH (test code = 3.3 mmol/L                             



             1698798770)                                         

 

             MAL (test code = Normal Ranges: ? ?                           



             MAL)         Nonfasting ? ? ? ? ? Less                           



                          than 0.1 mmol/L ? ?                           



                          Overnight Fast ? ? ? Less                           



                          than 0.4 mmol/L ? ? Fasting                           



                          (1-2 weeks) ?6-8 mmol/L Test                          

 



                          developed and                           



                          characteristics determined                           



                          by [facility].                          

 



[facility]





             Test Item    Value        Reference Range Interpretation Comments

 

             POCT GLU (test code = 8878231339) 192 mg/dL           H      

      

 

             Lab Interpretation (test code = Abnormal                           

    



             85751-7)                                            



[facility] GLUCOSE (AUTOMATED)2022 17:29:05





             Test Item    Value        Reference Range Interpretation Comments

 

             POCT GLU (test code = 8253642068) 192 mg/dL           H      

      

 

             Lab Interpretation (test code = Abnormal                           

    



             66461-2)                                            



[facility] [id number] 16:28:42





             Test Item    Value        Reference Range Interpretation Comments

 

             OSMOLALITY (test code =              See_Comment  H             [Au

tomated message]



             2692-2)                                             The system [employer]



                                                                 generated this 

result



                                                                 transmitted ref

erence



                                                                 range: 278 - 30

5



                                                                 mOsm/kg. The



                                                                 reference range

 was



                                                                 not used to int

erpret



                                                                 this result as



                                                                 normal/abnormal

.

 

             Lab Interpretation (test Abnormal                               



             code = 85277-7)                                        



[facility] [id number] 16:28:42





             Test Item    Value        Reference Range Interpretation Comments

 

             OSMOLALITY (test code =              See_Comment  H             [Au

tomated message]



             2692-2)                                             The system [employer]



                                                                 generated this 

result



                                                                 transmitted ref

erence



                                                                 range: 278 - 30

5



                                                                 mOsm/kg. The



                                                                 reference range

 was



                                                                 not used to int

erpret



                                                                 this result as



                                                                 normal/abnormal

.

 

             Lab Interpretation (test Abnormal                               



             code = 22360-5)                                        



[facility] [id number] 16:28:42





             Test Item    Value        Reference Range Interpretation Comments

 

             OSMOLALITY (test code =              See_Comment  H             [Au

tomated message]



             2692-2)                                             The system [employer]



                                                                 generated this 

result



                                                                 transmitted ref

erence



                                                                 range: 278 - 30

5



                                                                 mOsm/kg. The



                                                                 reference range

 was



                                                                 not used to int

erpret



                                                                 this result as



                                                                 normal/abnormal

.

 

             Lab Interpretation (test Abnormal                               



             code = 83530-8)                                        



[facility] Metabolic Panel (Na, K, Cl, CO2, 
Glucose, BUN, Creatinine, Ca)2022 16:02:27





             Test Item    Value        Reference Range Interpretation Comments

 

             NA (test code = 138 mmol/L   135-145                   



             [id number])                                         

 

             K (test code = 3.7 mmol/L   3.5-5                     



             3716277450)                                         

 

             CL (test code = 117 mmol/L          H            



             8154916523)                                         

 

             CO2 TOTAL (test code = 15 mmol/L    23-31        L            



             4531439612)                                         

 

             AGAP (test code =              2-16                      



             1611514759)                                         

 

             BUN (test code = 7 mg/dL      7-23                      



             6881100058)                                         

 

             GLUCOSE (test code = 204 mg/dL           H            



             8268037838)                                         

 

             CREATININE (test code = 0.41 mg/dL   0.6-1.25     L            



             0709217584)                                         

 

             CALCIUM (test code = 8.3 mg/dL    8.6-10.6     L            



             7378500319)                                         

 

             eGFR (test code =              mL/min/1.73m2              



             4870575378)                                         

 

             MAL (test code = MAL) Association of                           



                          Glomerular Filtration                           



                          Rate (GFR) and Staging                           



                          of Kidney Disease*                           



                          +---------------------                           



                          --+-------------------                           



                          --+-------------------                           



                          ------+| GFR                           



                          (mL/min/1.73 m2) ?|                           



                          With Kidney Damage ?|                           



                          ?Without Kidney                           



                          Damage+---------------                           



                          --------+-------------                           



                          --------+-------------                           



                          ------------+| ?>90 ?                           



                          ? ? ? ? ? ? ? ?|                           



                          ?Stage one ? ? ? ? ?|                           



                          ? Normal ? ? ? ? ? ? ?                           



                          ?+--------------------                           



                          ---+------------------                           



                          ---+------------------                           



                          -------+| ?60-89 ? ? ?                           



                          ? ? ? ? ?| ?Stage two                           



                          ? ? ? ? ?| ? Decreased                           



                          GFR ? ? ? ?                            



                          +---------------------                           



                          --+-------------------                           



                          --+-------------------                           



                          ------+| ?30-59 ? ? ?                           



                          ? ? ? ? ?| ?Stage                           



                          three ? ? ? ?| ? Stage                           



                          three ? ? ? ? ?                           



                          +---------------------                           



                          --+-------------------                           



                          --+-------------------                           



                          ------+| ?15-29 ? ? ?                           



                          ? ? ? ? ?| ?Stage four                           



                          ? ? ? ? | ? Stage four                           



                          ? ? ? ? ?                              



                          ?+--------------------                           



                          ---+------------------                           



                          ---+------------------                           



                          -------+| ?<15 (or                           



                          dialysis) ? ?| ?Stage                           



                          five ? ? ? ? | ? Stage                           



                          five ? ? ? ? ?                           



                          ?+--------------------                           



                          ---+------------------                           



                          ---+------------------                           



                          -------+ *Each stage                           



                          assumes the associated                           



                          GFR level has been in                           



                          effect for at least                           



                          three months. ?Stages                           



                          1 to 5, with or                           



                          without kidney                           



                          disease, indicate                           



                          chronic kidney                           



                          disease. Notes:                           



                          Determination of                           



                          stages one and two                           



                          (with eGFR                             



                          >59mL/min/1.73 m2)                           



                          requires estimation of                           



                          kidney damage for at                           



                          least three months as                           



                          defined by structural                           



                          or functional                           



                          abnormalities of the                           



                          kidney, manifested by                           



                          either:Pathological                           



                          abnormalities or                           



                          Markers of kidney                           



                          damage (including                           



                          abnormalities in the                           



                          composition of the                           



                          blood or urine or                           



                          abnormalities in                           



                          imaging tests).                           

 

             Lab Interpretation Abnormal                               



             (test code = 40252-0)                                        



[facility] Metabolic Panel (Na, K, Cl, CO2, 
Glucose, BUN, Creatinine, Ca)2022 16:02:27





             Test Item    Value        Reference Range Interpretation Comments

 

             NA (test code = 138 mmol/L   135-145                   



             8595723465)                                         

 

             K (test code = 3.7 mmol/L   3.5-5                     



             1829533553)                                         

 

             CL (test code = 117 mmol/L          H            



             0810500544)                                         

 

             CO2 TOTAL (test code = 15 mmol/L    23-31        L            



             1469947273)                                         

 

             AGAP (test code =              2-16                      



             3346372158)                                         

 

             BUN (test code = 7 mg/dL      7-23                      



             5691920009)                                         

 

             GLUCOSE (test code = 204 mg/dL           H            



             0755531511)                                         

 

             CREATININE (test code = 0.41 mg/dL   0.6-1.25     L            



             4870465894)                                         

 

             CALCIUM (test code = 8.3 mg/dL    8.6-10.6     L            



             1024524372)                                         

 

             eGFR (test code =              mL/min/1.73m2              



             7209477016)                                         

 

             MAL (test code = MAL) Association of                           



                          Glomerular Filtration                           



                          Rate (GFR) and Staging                           



                          of Kidney Disease*                           



                          +---------------------                           



                          --+-------------------                           



                          --+-------------------                           



                          ------+| GFR                           



                          (mL/min/1.73 m2) ?|                           



                          With Kidney Damage ?|                           



                          ?Without Kidney                           



                          Damage+---------------                           



                          --------+-------------                           



                          --------+-------------                           



                          ------------+| ?>90 ?                           



                          ? ? ? ? ? ? ? ?|                           



                          ?Stage one ? ? ? ? ?|                           



                          ? Normal ? ? ? ? ? ? ?                           



                          ?+--------------------                           



                          ---+------------------                           



                          ---+------------------                           



                          -------+| ?60-89 ? ? ?                           



                          ? ? ? ? ?| ?Stage two                           



                          ? ? ? ? ?| ? Decreased                           



                          GFR ? ? ? ?                            



                          +---------------------                           



                          --+-------------------                           



                          --+-------------------                           



                          ------+| ?30-59 ? ? ?                           



                          ? ? ? ? ?| ?Stage                           



                          three ? ? ? ?| ? Stage                           



                          three ? ? ? ? ?                           



                          +---------------------                           



                          --+-------------------                           



                          --+-------------------                           



                          ------+| ?15-29 ? ? ?                           



                          ? ? ? ? ?| ?Stage four                           



                          ? ? ? ? | ? Stage four                           



                          ? ? ? ? ?                              



                          ?+--------------------                           



                          ---+------------------                           



                          ---+------------------                           



                          -------+| ?<15 (or                           



                          dialysis) ? ?| ?Stage                           



                          five ? ? ? ? | ? Stage                           



                          five ? ? ? ? ?                           



                          ?+--------------------                           



                          ---+------------------                           



                          ---+------------------                           



                          -------+ *Each stage                           



                          assumes the associated                           



                          GFR level has been in                           



                          effect for at least                           



                          three months. ?Stages                           



                          1 to 5, with or                           



                          without kidney                           



                          disease, indicate                           



                          chronic kidney                           



                          disease. Notes:                           



                          Determination of                           



                          stages one and two                           



                          (with eGFR                             



                          >59mL/min/1.73 m2)                           



                          requires estimation of                           



                          kidney damage for at                           



                          least three months as                           



                          defined by structural                           



                          or functional                           



                          abnormalities of the                           



                          kidney, manifested by                           



                          either:Pathological                           



                          abnormalities or                           



                          Markers of kidney                           



                          damage (including                           



                          abnormalities in the                           



                          composition of the                           



                          blood or urine or                           



                          abnormalities in                           



                          imaging tests).                           

 

             Lab Interpretation Abnormal                               



             (test code = 63557-2)                                        



[facility] GLUCOSE (AUTOMATED)2022 15:54:35





             Test Item    Value        Reference Range Interpretation Comments

 

             POCT GLU (test code = 9470390289) 200 mg/dL           H      

      

 

             Lab Interpretation (test code = Abnormal                           

    



             80925-3)                                            



[facility] GLUCOSE (AUTOMATED)2022 15:54:35





             Test Item    Value        Reference Range Interpretation Comments

 

             POCT GLU (test code = 1139938633) 200 mg/dL           H      

      

 

             Lab Interpretation (test code = Abnormal                           

    



             16243-3)                                            



[facility] GLUCOSE (AUTOMATED)2022 14:57:17





             Test Item    Value        Reference Range Interpretation Comments

 

             POCT GLU (test code = 9500534224) 211 mg/dL           H      

      

 

             Lab Interpretation (test code = Abnormal                           

    



             51262-1)                                            



[facility] GLUCOSE (AUTOMATED)2022 14:57:17





             Test Item    Value        Reference Range Interpretation Comments

 

             POCT GLU (test code = 6387622714) 211 mg/dL           H      

      

 

             Lab Interpretation (test code = Abnormal                           

    



             69437-1)                                            



[facility] GLUCOSE (AUTOMATED)2022 13:50:06





             Test Item    Value        Reference Range Interpretation Comments

 

             POCT GLU (test code = 1302101162) 216 mg/dL           H      

      

 

             Lab Interpretation (test code = Abnormal                           

    



             65979-7)                                            



[facility] GLUCOSE (AUTOMATED)2022 13:50:06





             Test Item    Value        Reference Range Interpretation Comments

 

             POCT GLU (test code = 5489465347) 216 mg/dL           H      

      

 

             Lab Interpretation (test code = Abnormal                           

    



             95647-6)                                            



[facility] GLUCOSE (AUTOMATED)2022 10:43:13





             Test Item    Value        Reference Range Interpretation Comments

 

             POCT GLU (test code = 9934861416) 199 mg/dL           H      

      

 

             Lab Interpretation (test code = Abnormal                           

    



             31637-4)                                            



[facility] GLUCOSE (AUTOMATED)2022 10:43:13





             Test Item    Value        Reference Range Interpretation Comments

 

             POCT GLU (test code = 8082098188) 199 mg/dL           H      

      

 

             Lab Interpretation (test code = Abnormal                           

    



             68493-4)                                            



[facility] GLUCOSE (AUTOMATED)2022 09:31:59





             Test Item    Value        Reference Range Interpretation Comments

 

             POCT GLU (test code = 4680208857) 172 mg/dL           H      

      

 

             Lab Interpretation (test code = Abnormal                           

    



             59680-9)                                            



[facility]





             Test Item    Value        Reference Range Interpretation Comments

 

             POCT GLU (test code = 1686201077) 172 mg/dL           H      

      

 

             Lab Interpretation (test code = Abnormal                           

    



             98238-5)                                            



[facility] Metabolic Panel (Na, K, Cl, CO2, 
Glucose, BUN, Creatinine, Ca)2022 08:02:13





             Test Item    Value        Reference Range Interpretation Comments

 

             NA (test code = 140 mmol/L   135-145                   



             1435898760)                                         

 

             K (test code = 3.8 mmol/L   3.5-5                     



             2509466103)                                         

 

             CL (test code = 110 mmol/L          H            



             4445497281)                                         

 

             CO2 TOTAL (test code = 13 mmol/L    23-31        L            



             1617620167)                                         

 

             AGAP (test code =              2-16         H            



             2485913086)                                         

 

             BUN (test code = 10 mg/dL     7-23                      



             2427548944)                                         

 

             GLUCOSE (test code = 223 mg/dL           H            



             8559000492)                                         

 

             CREATININE (test code = 0.58 mg/dL   0.6-1.25     L            



             5686381354)                                         

 

             CALCIUM (test code = 8.8 mg/dL    8.6-10.6                  



             6304225015)                                         

 

             eGFR (test code =              mL/min/1.73m2              



             6299684592)                                         

 

             MAL (test code = MAL) Association of                           



                          Glomerular Filtration                           



                          Rate (GFR) and Staging                           



                          of Kidney Disease*                           



                          +---------------------                           



                          --+-------------------                           



                          --+-------------------                           



                          ------+| GFR                           



                          (mL/min/1.73 m2) ?|                           



                          With Kidney Damage ?|                           



                          ?Without Kidney                           



                          Damage+---------------                           



                          --------+-------------                           



                          --------+-------------                           



                          ------------+| ?>90 ?                           



                          ? ? ? ? ? ? ? ?|                           



                          ?Stage one ? ? ? ? ?|                           



                          ? Normal ? ? ? ? ? ? ?                           



                          ?+--------------------                           



                          ---+------------------                           



                          ---+------------------                           



                          -------+| ?60-89 ? ? ?                           



                          ? ? ? ? ?| ?Stage two                           



                          ? ? ? ? ?| ? Decreased                           



                          GFR ? ? ? ?                            



                          +---------------------                           



                          --+-------------------                           



                          --+-------------------                           



                          ------+| ?30-59 ? ? ?                           



                          ? ? ? ? ?| ?Stage                           



                          three ? ? ? ?| ? Stage                           



                          three ? ? ? ? ?                           



                          +---------------------                           



                          --+-------------------                           



                          --+-------------------                           



                          ------+| ?15-29 ? ? ?                           



                          ? ? ? ? ?| ?Stage four                           



                          ? ? ? ? | ? Stage four                           



                          ? ? ? ? ?                              



                          ?+--------------------                           



                          ---+------------------                           



                          ---+------------------                           



                          -------+| ?<15 (or                           



                          dialysis) ? ?| ?Stage                           



                          five ? ? ? ? | ? Stage                           



                          five ? ? ? ? ?                           



                          ?+--------------------                           



                          ---+------------------                           



                          ---+------------------                           



                          -------+ *Each stage                           



                          assumes the associated                           



                          GFR level has been in                           



                          effect for at least                           



                          three months. ?Stages                           



                          1 to 5, with or                           



                          without kidney                           



                          disease, indicate                           



                          chronic kidney                           



                          disease. Notes:                           



                          Determination of                           



                          stages one and two                           



                          (with eGFR                             



                          >59mL/min/1.73 m2)                           



                          requires estimation of                           



                          kidney damage for at                           



                          least three months as                           



                          defined by structural                           



                          or functional                           



                          abnormalities of the                           



                          kidney, manifested by                           



                          either:Pathological                           



                          abnormalities or                           



                          Markers of kidney                           



                          damage (including                           



                          abnormalities in the                           



                          composition of the                           



                          blood or urine or                           



                          abnormalities in                           



                          imaging tests).                           

 

             Lab Interpretation Abnormal                               



             (test code = 39296-8)                                        



[facility] Metabolic Panel (Na, K, Cl, CO2, 
Glucose, BUN, Creatinine, Ca)2022 08:02:13





             Test Item    Value        Reference Range Interpretation Comments

 

             NA (test code = 140 mmol/L   135-145                   



             3040099986)                                         

 

             K (test code = 3.8 mmol/L   3.5-5                     



             7956336784)                                         

 

             CL (test code = 110 mmol/L          H            



             2988665194)                                         

 

             CO2 TOTAL (test code = 13 mmol/L    23-31        L            



             8880105594)                                         

 

             AGAP (test code =              2-16         H            



             4603022106)                                         

 

             BUN (test code = 10 mg/dL     7-23                      



             6508355515)                                         

 

             GLUCOSE (test code = 223 mg/dL           H            



             2459212461)                                         

 

             CREATININE (test code = 0.58 mg/dL   0.6-1.25     L            



             0378030127)                                         

 

             CALCIUM (test code = 8.8 mg/dL    8.6-10.6                  



             5427067677)                                         

 

             eGFR (test code =              mL/min/1.73m2              



             1477871547)                                         

 

             MAL (test code = MAL) Association of                           



                          Glomerular Filtration                           



                          Rate (GFR) and Staging                           



                          of Kidney Disease*                           



                          +---------------------                           



                          --+-------------------                           



                          --+-------------------                           



                          ------+| GFR                           



                          (mL/min/1.73 m2) ?|                           



                          With Kidney Damage ?|                           



                          ?Without Kidney                           



                          Damage+---------------                           



                          --------+-------------                           



                          --------+-------------                           



                          ------------+| ?>90 ?                           



                          ? ? ? ? ? ? ? ?|                           



                          ?Stage one ? ? ? ? ?|                           



                          ? Normal ? ? ? ? ? ? ?                           



                          ?+--------------------                           



                          ---+------------------                           



                          ---+------------------                           



                          -------+| ?60-89 ? ? ?                           



                          ? ? ? ? ?| ?Stage two                           



                          ? ? ? ? ?| ? Decreased                           



                          GFR ? ? ? ?                            



                          +---------------------                           



                          --+-------------------                           



                          --+-------------------                           



                          ------+| ?30-59 ? ? ?                           



                          ? ? ? ? ?| ?Stage                           



                          three ? ? ? ?| ? Stage                           



                          three ? ? ? ? ?                           



                          +---------------------                           



                          --+-------------------                           



                          --+-------------------                           



                          ------+| ?15-29 ? ? ?                           



                          ? ? ? ? ?| ?Stage four                           



                          ? ? ? ? | ? Stage four                           



                          ? ? ? ? ?                              



                          ?+--------------------                           



                          ---+------------------                           



                          ---+------------------                           



                          -------+| ?<15 (or                           



                          dialysis) ? ?| ?Stage                           



                          five ? ? ? ? | ? Stage                           



                          five ? ? ? ? ?                           



                          ?+--------------------                           



                          ---+------------------                           



                          ---+------------------                           



                          -------+ *Each stage                           



                          assumes the associated                           



                          GFR level has been in                           



                          effect for at least                           



                          three months. ?Stages                           



                          1 to 5, with or                           



                          without kidney                           



                          disease, indicate                           



                          chronic kidney                           



                          disease. Notes:                           



                          Determination of                           



                          stages one and two                           



                          (with eGFR                             



                          >59mL/min/1.73 m2)                           



                          requires estimation of                           



                          kidney damage for at                           



                          least three months as                           



                          defined by structural                           



                          or functional                           



                          abnormalities of the                           



                          kidney, manifested by                           



                          either:Pathological                           



                          abnormalities or                           



                          Markers of kidney                           



                          damage (including                           



                          abnormalities in the                           



                          composition of the                           



                          blood or urine or                           



                          abnormalities in                           



                          imaging tests).                           

 

             Lab Interpretation Abnormal                               



             (test code = 59660-2)                                        



[facility] GLUCOSE (AUTOMATED)2022 07:26:01





             Test Item    Value        Reference Range Interpretation Comments

 

             POCT GLU (test code = 7354166491) 245 mg/dL           H      

      

 

             Lab Interpretation (test code = Abnormal                           

    



             39046-6)                                            



[facility] GLUCOSE (AUTOMATED)2022 07:26:01





             Test Item    Value        Reference Range Interpretation Comments

 

             POCT GLU (test code = 2524507306) 245 mg/dL           H      

      

 

             Lab Interpretation (test code = Abnormal                           

    



             28350-6)                                            



[facility] GLUCOSE(AGE &gt;30DAYS)2022 
07:11:00





             Test Item    Value        Reference Range Interpretation Comments

 

             POCT Glu (age>30days) (test code = 245 mg/dL                 

       



             3342)                                               

 

             Lab Interpretation (test code = Normal                             

    



             81204-9)                                            



[facility] GLUCOSE(AGE &gt;30DAYS)2022 
07:11:00





             Test Item    Value        Reference Range Interpretation Comments

 

             POCT Glu (age>30days) (test code = 245 mg/dL                 

       



             3342)                                               

 

             Lab Interpretation (test code = Normal                             

    



             07513-3)                                            



[facility] GLUCOSE(AGE &gt;30DAYS)2022 
07:11:00





             Test Item    Value        Reference Range Interpretation Comments

 

             POCT Glu (age>30days) (test code = 245 mg/dL                 

       



             3342)                                               

 

             Lab Interpretation (test code = Normal                             

    



             58843-8)                                            



[facility]





             Test Item    Value        Reference Range Interpretation Comments

 

             HGB A1C (test code = 12.7 %       4-5.7        H            



             4548-4)                                             

 

             MAL (test code = MAL) Reference                              



                          RangesNormal:                           



                          <5.7%Prediabetes:                           



                          5.7 - 6.4%Diabetes:                           



                          > 6.5%                                 

 

             Lab Interpretation (test Abnormal                               



             code = 69596-6)                                        



[facility] 
[facility]57:25





             Test Item    Value        Reference Range Interpretation Comments

 

             HGB A1C (test code = 12.7 %       4-5.7        H            



             4548-4)                                             

 

             MAL (test code = MAL) Reference                              



                          RangesNormal:                           



                          <5.7%Prediabetes:                           



                          5.7 - 6.4%Diabetes:                           



                          > 6.5%                                 

 

             Lab Interpretation (test Abnormal                               



             code = 11884-3)                                        



[facility] 
[facility]:57:25





             Test Item    Value        Reference Range Interpretation Comments

 

             HGB A1C (test code = 12.7 %       4-5.7        H            



             4548-4)                                             

 

             MAL (test code = MAL) Reference                              



                          RangesNormal:                           



                          <5.7%Prediabetes:                           



                          5.7 - 6.4%Diabetes:                           



                          > 6.5%                                 

 

             Lab Interpretation (test Abnormal                               



             code = 58047-7)                                        



[id number] 05:46:44





             Test Item    Value        Reference Range Interpretation Comments

 

             MAGNESIUM (test code = 6485512932) 1.7 mg/dL    1.7-2.4            

       

 

             Lab Interpretation (test code = Normal                             

    



             86342-7)                                            



[id number] 05:46:44





             Test Item    Value        Reference Range Interpretation Comments

 

             MAGNESIUM (test code = 9764250253) 1.7 mg/dL    1.7-2.4            

       

 

             Lab Interpretation (test code = Normal                             

    



             93024-3)                                            



[id number] 05:46:24





             Test Item    Value        Reference Range Interpretation Comments

 

             PHOSPHORUS (test code = 4778454459) 4.0 mg/dL    2.5-5             

        

 

             Lab Interpretation (test code = Normal                             

    



             80709-4)                                            



[facility] [id number] 05:46:24





             Test Item    Value        Reference Range Interpretation Comments

 

             PHOSPHORUS (test code = 7393600280) 4.0 mg/dL    2.5-5             

        

 

             Lab Interpretation (test code = Normal                             

    



             13389-3)                                            



[facility] [id number] 05:46:24





             Test Item    Value        Reference Range Interpretation Comments

 

             PHOSPHORUS (test code = 6698411814) 4.0 mg/dL    2.5-5             

        

 

             Lab Interpretation (test code = Normal                             

    



             55515-7)                                            



[facility] GLUCOSE (AUTOMATED)2022 05:11:10





             Test Item    Value        Reference Range Interpretation Comments

 

             POCT GLU (test code = 5594475110) 410 mg/dL           H      

      

 

             Lab Interpretation (test code = Abnormal                           

    



             76310-6)                                            



[facility] GLUCOSE (AUTOMATED)2022 05:11:10





             Test Item    Value        Reference Range Interpretation Comments

 

             POCT GLU (test code = 6512216576) 410 mg/dL           H      

      

 

             Lab Interpretation (test code = Abnormal                           

    



             47590-3)                                            



[facility] GLUCOSE(AGE &gt;30DAYS)2022 
05:11:00





             Test Item    Value        Reference Range Interpretation Comments

 

             POCT Glu (age>30days) (test code = 410 mg/dL           A     

       



             3342)                                               

 

             Lab Interpretation (test code = Abnormal                           

    



             76857-4)                                            



[facility] GLUCOSE(AGE &gt;30DAYS)2022 
05:11:00





             Test Item    Value        Reference Range Interpretation Comments

 

             POCT Glu (age>30days) (test code = 410 mg/dL           A     

       



             3342)                                               

 

             Lab Interpretation (test code = Abnormal                           

    



             63459-1)                                            



[facility] WITH [id number] 05:03:57





             Test Item    Value        Reference Range Interpretation Comments

 

             WBC (test code =              See_Comment                [Automated



             5490-2)                                             message] The sy

stem



                                                                 which generated



                                                                 this result



                                                                 transmitted



                                                                 reference range

:



                                                                 4.20 - 10.70



                                                                 10*3/?L. The



                                                                 reference range

 was



                                                                 not used to



                                                                 interpret this



                                                                 result as



                                                                 normal/abnormal

.

 

             RBC (test code =              See_Comment                [Automated



             469-8)                                              message] The sy

stem



                                                                 which generated



                                                                 this result



                                                                 transmitted



                                                                 reference range

:



                                                                 4.26 - 5.52



                                                                 10*6/?L. The



                                                                 reference range

 was



                                                                 not used to



                                                                 interpret this



                                                                 result as



                                                                 normal/abnormal

.

 

             HGB (test code = 13.9 g/dL    12.2-16.4                 



             718-7)                                              

 

             HCT (test code = 42.6 %       38.4-49.3                 



             4544-3)                                             

 

             MCV (test code = 88.2 fL      81.7-95.6                 



             787-2)                                              

 

             MCH (test code = 28.8 pg      26.1-32.7                 



             785-6)                                              

 

             MCHC (test code = 32.6 g/dL    31.2-35                   



             786-4)                                              

 

             RDW-SD (test code = 44.1 fL      38.5-51.6                 



             50736-9)                                            

 

             RDW-CV (test code = 13.8 %       12.1-15.4                 



             788-0)                                              

 

             PLT (test code =              See_Comment  H             [Automated



             777-3)                                              message] The sy

stem



                                                                 which generated



                                                                 this result



                                                                 transmitted



                                                                 reference range

:



                                                                 150 - 328 10*3/

?L.



                                                                 The reference r

zhen



                                                                 was not used to



                                                                 interpret this



                                                                 result as



                                                                 normal/abnormal

.

 

             MPV (test code = 12.2 fL      9.8-13                    



             89932-7)                                            

 

             IPF % (test code = 9.4 %        1.2-10.7                  Platelet 

count



             [id number])                                         measured by



                                                                 fluorescence



                                                                 method.

 

             NRBC/100 WBC (test              See_Comment                [Automat

ed



             code = 6937456731)                                        message] 

The system



                                                                 which generated



                                                                 this result



                                                                 transmitted



                                                                 reference range

:



                                                                 0.0 - 10.0 /100



                                                                 WBCs. The refer

ence



                                                                 range was not u

sed



                                                                 to interpret th

is



                                                                 result as



                                                                 normal/abnormal

.

 

             NRBC x10^3 (test code              See_Comment                [Auto

mated



             = [id number])                                        message] The s

ystem



                                                                 which generated



                                                                 this result



                                                                 transmitted



                                                                 reference range

:



                                                                 10*3/?L. The



                                                                 reference range

 was



                                                                 not used to



                                                                 interpret this



                                                                 result as



                                                                 normal/abnormal

.

 

             GRAN MAT (NEUT) % 65.1 %                                 



             (test code = 770-8)                                        

 

             IMM GRAN % (test code 1.10 %                                 



             = [id number])                                        

 

             LYMPH % (test code = 22.4 %                                 



             736-9)                                              

 

             MONO % (test code = 9.8 %                                  



             5905-5)                                             

 

             EOS % (test code = 1.4 %                                  



             713-8)                                              

 

             BASO % (test code = 0.2 %                                  



             706-2)                                              

 

             GRAN MAT x10^3(ANC) 6.15 10*3/uL 1.99-6.95                 



             (test code =                                        



             [id number])                                         

 

             IMM GRAN x10^3 (test 0.10 10*3/uL 0-0.06       H            



             code = 5320529755)                                        

 

             LYMPH x10^3 (test code 2.11 10*3/uL 1.09-3.23                 



             = 731-0)                                            

 

             MONO x10^3 (test code 0.92 10*3/uL 0.36-1.02                 



             = 742-7)                                            

 

             EOS x10^3 (test code = 0.13 10*3/uL 0.06-0.53                 



             711-2)                                              

 

             BASO x10^3 (test code              0.01-0.09                 



             = 704-7)                                            

 

             Lab Interpretation Abnormal                               



             (test code = 82877-2)                                        



[facility] WITH [id number] 05:03:57





             Test Item    Value        Reference Range Interpretation Comments

 

             WBC (test code =              See_Comment                [Automated



             1690-2)                                             message] The sy

stem



                                                                 which generated



                                                                 this result



                                                                 transmitted



                                                                 reference range

:



                                                                 4.20 - 10.70



                                                                 10*3/?L. The



                                                                 reference range

 was



                                                                 not used to



                                                                 interpret this



                                                                 result as



                                                                 normal/abnormal

.

 

             RBC (test code =              See_Comment                [Automated



             649-8)                                              message] The sy

stem



                                                                 which generated



                                                                 this result



                                                                 transmitted



                                                                 reference range

:



                                                                 4.26 - 5.52



                                                                 10*6/?L. The



                                                                 reference range

 was



                                                                 not used to



                                                                 interpret this



                                                                 result as



                                                                 normal/abnormal

.

 

             HGB (test code = 13.9 g/dL    12.2-16.4                 



             718-7)                                              

 

             HCT (test code = 42.6 %       38.4-49.3                 



             4544-3)                                             

 

             MCV (test code = 88.2 fL      81.7-95.6                 



             787-2)                                              

 

             MCH (test code = 28.8 pg      26.1-32.7                 



             785-6)                                              

 

             MCHC (test code = 32.6 g/dL    31.2-35                   



             786-4)                                              

 

             RDW-SD (test code = 44.1 fL      38.5-51.6                 



             16039-4)                                            

 

             RDW-CV (test code = 13.8 %       12.1-15.4                 



             788-0)                                              

 

             PLT (test code =              See_Comment  H             [Automated



             837-3)                                              message] The sy

stem



                                                                 which generated



                                                                 this result



                                                                 transmitted



                                                                 reference range

:



                                                                 150 - 328 10*3/

?L.



                                                                 The reference r

zhen



                                                                 was not used to



                                                                 interpret this



                                                                 result as



                                                                 normal/abnormal

.

 

             MPV (test code = 12.2 fL      9.8-13                    



             53055-5)                                            

 

             IPF % (test code = 9.4 %        1.2-10.7                  Platelet 

count



             7376458787)                                         measured by



                                                                 fluorescence



                                                                 method.

 

             NRBC/100 WBC (test              See_Comment                [Automat

ed



             code = 7330712551)                                        message] 

The system



                                                                 which generated



                                                                 this result



                                                                 transmitted



                                                                 reference range

:



                                                                 0.0 - 10.0 /100



                                                                 WBCs. The refer

ence



                                                                 range was not u

sed



                                                                 to interpret th

is



                                                                 result as



                                                                 normal/abnormal

.

 

             NRBC x10^3 (test code              See_Comment                [Auto

mated



             = [id number])                                        message] The s

ystem



                                                                 which generated



                                                                 this result



                                                                 transmitted



                                                                 reference range

:



                                                                 10*3/?L. The



                                                                 reference range

 was



                                                                 not used to



                                                                 interpret this



                                                                 result as



                                                                 normal/abnormal

.

 

             GRAN MAT (NEUT) % 65.1 %                                 



             (test code = 770-8)                                        

 

             IMM GRAN % (test code 1.10 %                                 



             = 5244150004)                                        

 

             LYMPH % (test code = 22.4 %                                 



             736-9)                                              

 

             MONO % (test code = 9.8 %                                  



             5905-5)                                             

 

             EOS % (test code = 1.4 %                                  



             713-8)                                              

 

             BASO % (test code = 0.2 %                                  



             706-2)                                              

 

             GRAN MAT x10^3(ANC) 6.15 10*3/uL 1.99-6.95                 



             (test code =                                        



             6457010999)                                         

 

             IMM GRAN x10^3 (test 0.10 10*3/uL 0-0.06       H            



             code = 5344882027)                                        

 

             LYMPH x10^3 (test code 2.11 10*3/uL 1.09-3.23                 



             = 731-0)                                            

 

             MONO x10^3 (test code 0.92 10*3/uL 0.36-1.02                 



             = 742-7)                                            

 

             EOS x10^3 (test code = 0.13 10*3/uL 0.06-0.53                 



             711-2)                                              

 

             BASO x10^3 (test code              0.01-0.09                 



             = 704-7)                                            

 

             Lab Interpretation Abnormal                               



             (test code = 48894-5)                                        



[facility] METABOLIC PANEL (NA, K, CL, CO2, 
GLUCOSE, BUN, CREATININE, CA)2022 04:46:12





             Test Item    Value        Reference Range Interpretation Comments

 

             NA (test code = 136 mmol/L   135-145                   



             5292879280)                                         

 

             K (test code = 5.1 mmol/L   3.5-5        H            



             9792760498)                                         

 

             CL (test code = 106 mmol/L                       



             0383238922)                                         

 

             CO2 TOTAL (test code = 9 mmol/L     23-31        L            



             7353123277)                                         

 

             AGAP (test code =              2-16         H            



             5249009889)                                         

 

             BUN (test code = 11 mg/dL     7-23                      



             6745732988)                                         

 

             GLUCOSE (test code = 405 mg/dL           H            



             9749459633)                                         

 

             CREATININE (test code = 0.62 mg/dL   0.6-1.25                  



             7465303522)                                         

 

             CALCIUM (test code = 9.5 mg/dL    8.6-10.6                  



             3627927891)                                         

 

             eGFR (test code =              mL/min/1.73m2              



             8855326890)                                         

 

             MAL (test code = MAL) Association of                           



                          Glomerular Filtration                           



                          Rate (GFR) and Staging                           



                          of Kidney Disease*                           



                          +---------------------                           



                          --+-------------------                           



                          --+-------------------                           



                          ------+| GFR                           



                          (mL/min/1.73 m2) ?|                           



                          With Kidney Damage ?|                           



                          ?Without Kidney                           



                          Damage+---------------                           



                          --------+-------------                           



                          --------+-------------                           



                          ------------+| ?>90 ?                           



                          ? ? ? ? ? ? ? ?|                           



                          ?Stage one ? ? ? ? ?|                           



                          ? Normal ? ? ? ? ? ? ?                           



                          ?+--------------------                           



                          ---+------------------                           



                          ---+------------------                           



                          -------+| ?60-89 ? ? ?                           



                          ? ? ? ? ?| ?Stage two                           



                          ? ? ? ? ?| ? Decreased                           



                          GFR ? ? ? ?                            



                          +---------------------                           



                          --+-------------------                           



                          --+-------------------                           



                          ------+| ?30-59 ? ? ?                           



                          ? ? ? ? ?| ?Stage                           



                          three ? ? ? ?| ? Stage                           



                          three ? ? ? ? ?                           



                          +---------------------                           



                          --+-------------------                           



                          --+-------------------                           



                          ------+| ?15-29 ? ? ?                           



                          ? ? ? ? ?| ?Stage four                           



                          ? ? ? ? | ? Stage four                           



                          ? ? ? ? ?                              



                          ?+--------------------                           



                          ---+------------------                           



                          ---+------------------                           



                          -------+| ?<15 (or                           



                          dialysis) ? ?| ?Stage                           



                          five ? ? ? ? | ? Stage                           



                          five ? ? ? ? ?                           



                          ?+--------------------                           



                          ---+------------------                           



                          ---+------------------                           



                          -------+ *Each stage                           



                          assumes the associated                           



                          GFR level has been in                           



                          effect for at least                           



                          three months. ?Stages                           



                          1 to 5, with or                           



                          without kidney                           



                          disease, indicate                           



                          chronic kidney                           



                          disease. Notes:                           



                          Determination of                           



                          stages one and two                           



                          (with eGFR                             



                          >59mL/min/1.73 m2)                           



                          requires estimation of                           



                          kidney damage for at                           



                          least three months as                           



                          defined by structural                           



                          or functional                           



                          abnormalities of the                           



                          kidney, manifested by                           



                          either:Pathological                           



                          abnormalities or                           



                          Markers of kidney                           



                          damage (including                           



                          abnormalities in the                           



                          composition of the                           



                          blood or urine or                           



                          abnormalities in                           



                          imaging tests).                           

 

             Lab Interpretation Abnormal                               



             (test code = 74813-9)                                        



[facility] METABOLIC PANEL (NA, K, CL, CO2, 
GLUCOSE, BUN, CREATININE, CA)2022 04:46:12





             Test Item    Value        Reference Range Interpretation Comments

 

             NA (test code = 136 mmol/L   135-145                   



             0215281085)                                         

 

             K (test code = 5.1 mmol/L   3.5-5        H            



             4201506046)                                         

 

             CL (test code = 106 mmol/L                       



             2210359525)                                         

 

             CO2 TOTAL (test code = 9 mmol/L     23-31        L            



             9982200169)                                         

 

             AGAP (test code =              2-16         H            



             6824996458)                                         

 

             BUN (test code = 11 mg/dL     7-23                      



             4566711083)                                         

 

             GLUCOSE (test code = 405 mg/dL           H            



             8532989420)                                         

 

             CREATININE (test code = 0.62 mg/dL   0.6-1.25                  



             8762361408)                                         

 

             CALCIUM (test code = 9.5 mg/dL    8.6-10.6                  



             3835590438)                                         

 

             eGFR (test code =              mL/min/1.73m2              



             6717847996)                                         

 

             MAL (test code = MAL) Association of                           



                          Glomerular Filtration                           



                          Rate (GFR) and Staging                           



                          of Kidney Disease*                           



                          +---------------------                           



                          --+-------------------                           



                          --+-------------------                           



                          ------+| GFR                           



                          (mL/min/1.73 m2) ?|                           



                          With Kidney Damage ?|                           



                          ?Without Kidney                           



                          Damage+---------------                           



                          --------+-------------                           



                          --------+-------------                           



                          ------------+| ?>90 ?                           



                          ? ? ? ? ? ? ? ?|                           



                          ?Stage one ? ? ? ? ?|                           



                          ? Normal ? ? ? ? ? ? ?                           



                          ?+--------------------                           



                          ---+------------------                           



                          ---+------------------                           



                          -------+| ?60-89 ? ? ?                           



                          ? ? ? ? ?| ?Stage two                           



                          ? ? ? ? ?| ? Decreased                           



                          GFR ? ? ? ?                            



                          +---------------------                           



                          --+-------------------                           



                          --+-------------------                           



                          ------+| ?30-59 ? ? ?                           



                          ? ? ? ? ?| ?Stage                           



                          three ? ? ? ?| ? Stage                           



                          three ? ? ? ? ?                           



                          +---------------------                           



                          --+-------------------                           



                          --+-------------------                           



                          ------+| ?15-29 ? ? ?                           



                          ? ? ? ? ?| ?Stage four                           



                          ? ? ? ? | ? Stage four                           



                          ? ? ? ? ?                              



                          ?+--------------------                           



                          ---+------------------                           



                          ---+------------------                           



                          -------+| ?<15 (or                           



                          dialysis) ? ?| ?Stage                           



                          five ? ? ? ? | ? Stage                           



                          five ? ? ? ? ?                           



                          ?+--------------------                           



                          ---+------------------                           



                          ---+------------------                           



                          -------+ *Each stage                           



                          assumes the associated                           



                          GFR level has been in                           



                          effect for at least                           



                          three months. ?Stages                           



                          1 to 5, with or                           



                          without kidney                           



                          disease, indicate                           



                          chronic kidney                           



                          disease. Notes:                           



                          Determination of                           



                          stages one and two                           



                          (with eGFR                             



                          >59mL/min/1.73 m2)                           



                          requires estimation of                           



                          kidney damage for at                           



                          least three months as                           



                          defined by structural                           



                          or functional                           



                          abnormalities of the                           



                          kidney, manifested by                           



                          either:Pathological                           



                          abnormalities or                           



                          Markers of kidney                           



                          damage (including                           



                          abnormalities in the                           



                          composition of the                           



                          blood or urine or                           



                          abnormalities in                           



                          imaging tests).                           

 

             Lab Interpretation Abnormal                               



             (test code = 16054-3)                                        



[id number] [facility]43:36





             Test Item    Value        Reference Range Interpretation Comments

 

             PROTIME PATIENT (test              See_Comment                [Auto

mated message]



             code = 5964-2)                                        The system 

[employer]



                                                                 generated this 

result



                                                                 transmitted ref

erence



                                                                 range: 12.0 - 1

4.7



                                                                 Seconds. The re

ference



                                                                 range was not u

sed to



                                                                 interpret this 

result



                                                                 as normal/abnor

mal.

 

             INR (test code = 6301-6)                                        Nor

mal INR <1.1;



                                                                 Warfarin Therap

eutic



                                                                 range 2.0 to 3.

0 or



                                                                 2.5 to 3.5, dep

ending



                                                                 upon the indica

tions.

 

             Lab Interpretation (test Normal                                 



             code = 01871-0)                                        



[facility] [id number] 04:43:36





             Test Item    Value        Reference Range Interpretation Comments

 

             PROTIME PATIENT (test              See_Comment                [Auto

mated message]



             code = 5964-2)                                        The system 

[employer]



                                                                 generated this 

result



                                                                 transmitted ref

erence



                                                                 range: 12.0 - 1

4.7



                                                                 Seconds. The re

ference



                                                                 range was not u

sed to



                                                                 interpret this 

result



                                                                 as normal/abnor

mal.

 

             INR (test code = 6301-6)                                        Nor

mal INR <1.1;



                                                                 Warfarin Therap

eutic



                                                                 range 2.0 to 3.

0 or



                                                                 2.5 to 3.5, dep

ending



                                                                 upon the indica

tions.

 

             Lab Interpretation (test Normal                                 



             code = 66896-4)                                        



[facility] [id number] 04:43:36





             Test Item    Value        Reference Range Interpretation Comments

 

             PROTIME PATIENT (test              See_Comment                [Auto

mated message]



             code = 5964-2)                                        The system 

[employer]



                                                                 generated this 

result



                                                                 transmitted ref

erence



                                                                 range: 12.0 - 1

4.7



                                                                 Seconds. The re

ference



                                                                 range was not u

sed to



                                                                 interpret this 

result



                                                                 as normal/abnor

mal.

 

             INR (test code = 6301-6)                                        Nor

mal INR <1.1;



                                                                 Warfarin Therap

eutic



                                                                 range 2.0 to 3.

0 or



                                                                 2.5 to 3.5, dep

ending



                                                                 upon the indica

tions.

 

             Lab Interpretation (test Normal                                 



             code = 29492-8)                                        



[facility] (ALB,T.PRO,BILI
T,BU/BC,ALT,AST,ALK PHOS)2022 04:40:15





             Test Item    Value        Reference Range Interpretation Comments

 

             TOTAL BILI (test code = 0457598305) 0.9 mg/dL    0.1-1.1           

        

 

             BILI UNCON (test code = 2961683083) 0.3 mg/dL    0.1-1.1           

        

 

             BILI CONJ (test code = 9896567165) 0.0 mg/dL    0-0.3              

       

 

             T PROTEIN (test code = 4950170321) 7.5 g/dL     6.3-8.2            

       

 

             ALBUMIN (test code = 7258916212) 4.0 g/dL     3.5-5                

     

 

             ALK PHOS (test code = 6037585213) 166 U/L             H      

      

 

             ALTv (test code = 1742-6) 28 U/L       5-50                      

 

             AST(SGOT) (test code = 2517511327) 22 U/L       13-40              

       

 

             Lab Interpretation (test code = Abnormal                           

    



             98976-2)                                            



[facility] (ALB,T.PRO,BILI
T,BU/BC,ALT,AST,ALK PHOS)2022 04:40:15





             Test Item    Value        Reference Range Interpretation Comments

 

             TOTAL BILI (test code = 4803772226) 0.9 mg/dL    0.1-1.1           

        

 

             BILI UNCON (test code = 5833938942) 0.3 mg/dL    0.1-1.1           

        

 

             BILI CONJ (test code = 8973922780) 0.0 mg/dL    0-0.3              

       

 

             T PROTEIN (test code = 1900018763) 7.5 g/dL     6.3-8.2            

       

 

             ALBUMIN (test code = 1726053137) 4.0 g/dL     3.5-5                

     

 

             ALK PHOS (test code = 2606904512) 166 U/L             H      

      

 

             ALTv (test code = 1742-6) 28 U/L       5-50                      

 

             AST(SGOT) (test code = 8809684260) 22 U/L       13-40              

       

 

             Lab Interpretation (test code = Abnormal                           

    



             55182-7)                                            



[facility]38[facility] (ALB,T.PRO,BILI
T,BU/BC,ALT,AST,ALK PHOS)2022 04:40:15





             Test Item    Value        Reference Range Interpretation Comments

 

             TOTAL BILI (test code = 0648886126) 0.9 mg/dL    0.1-1.1           

        

 

             BILI UNCON (test code = 2578482866) 0.3 mg/dL    0.1-1.1           

        

 

             BILI CONJ (test code = 9160883395) 0.0 mg/dL    0-0.3              

       

 

             T PROTEIN (test code = 0897291104) 7.5 g/dL     6.3-8.2            

       

 

             ALBUMIN (test code = 4403838691) 4.0 g/dL     3.5-5                

     

 

             ALK PHOS (test code = 7597817745) 166 U/L             H      

      

 

             ALTv (test code = 1742-6) 28 U/L       5-50                      

 

             AST(SGOT) (test code = 8712771669) 22 U/L       13-40              

       

 

             Lab Interpretation (test code = Abnormal                           

    



             75816-9)                                            



[facility]





             Test Item    Value        Reference Range Interpretation Comments

 

             LIPASE (test code = 7234638086) 239 U/L      0-220        H        

    

 

             Lab Interpretation (test code = Abnormal                           

    



             14702-0)                                            



[facility]





             Test Item    Value        Reference Range Interpretation Comments

 

             LIPASE (test code = 7171473750) 239 U/L      0-220        H        

    

 

             Lab Interpretation (test code = Abnormal                           

    



             75906-8)                                            



[facility]





             Test Item    Value        Reference Range Interpretation Comments

 

             LIPASE (test code = 4538447755) 239 U/L      0-220        H        

    

 

             Lab Interpretation (test code = Abnormal                           

    



             52134-6)                                            



[facility] [id number] 02:01:26





             Test Item    Value        Reference Range Interpretation Comments

 

             Blood Culture-Aerobic No organisms No growth                 Previo

us



             (test code = 17928-3) isolated                               prelim

inary



                                                                 verified result



                                                                 was Culture In



                                                                 Progress on



                                                                 2022 at 00

02



                                                                 CDTPrevious



                                                                 preliminary



                                                                 verified result



                                                                 was No growth a

t



                                                                 24 hours on



                                                                 2022 at 21

01



                                                                 CDTPrevious



                                                                 preliminary



                                                                 verified result



                                                                 was No growth a

t



                                                                 48 hours on



                                                                 2022 at 21

01



                                                                 CDTPrevious



                                                                 preliminary



                                                                 verified result



                                                                 was No growth a

t



                                                                 72 hours on



                                                                 2022 at 21

01



                                                                 CDT

 

             Blood        No organisms No growth                 Previous



             Culture-Anaerobic isolated                               preliminar

y



             (test code = 21400-6)                                        verifi

ed result



                                                                 was Culture In



                                                                 Progress on



                                                                 2022 at 00

02



                                                                 CDTPrevious



                                                                 preliminary



                                                                 verified result



                                                                 was No growth a

t



                                                                 24 hours on



                                                                 2022 at 21

01



                                                                 CDTPrevious



                                                                 preliminary



                                                                 verified result



                                                                 was No growth a

t



                                                                 48 hours on



                                                                 2022 at 21

01



                                                                 CDTPrevious



                                                                 preliminary



                                                                 verified result



                                                                 was No growth a

t



                                                                 72 hours on



                                                                 2022 at 21

01



                                                                 CDT

 

             Lab Interpretation Normal                                 



             (test code = 00167-9)                                        



[facility] GLUCOSE (AUTOMATED)2022 19:39:16





             Test Item    Value        Reference Range Interpretation Comments

 

             POCT GLU (test code = 4056167707) 153 mg/dL           H      

      

 

             Lab Interpretation (test code = Abnormal                           

    



             49692-4)                                            



[facility] GLUCOSE (AUTOMATED)2022 16:42:59





             Test Item    Value        Reference Range Interpretation Comments

 

             POCT GLU (test code = 5654721469) 311 mg/dL           H      

      

 

             Lab Interpretation (test code = Abnormal                           

    



             09383-6)                                            



[facility] GLUCOSE (AUTOMATED)2022 01:48:17





             Test Item    Value        Reference Range Interpretation Comments

 

             POCT GLU (test code = 4053644258) 253 mg/dL           H      

      

 

             Lab Interpretation (test code = Abnormal                           

    



             37772-8)                                            



[facility] GLUCOSE (AUTOMATED)2022 21:48:07





             Test Item    Value        Reference Range Interpretation Comments

 

             POCT GLU (test code = 9678431541) 279 mg/dL           H      

      

 

             Lab Interpretation (test code = Abnormal                           

    



             53490-2)                                            



[facility] GLUCOSE (AUTOMATED)2022 16:48:09





             Test Item    Value        Reference Range Interpretation Comments

 

             POCT GLU (test code = 8100841269) 275 mg/dL           H      

      

 

             Lab Interpretation (test code = Abnormal                           

    



             82461-6)                                            



[facility] METABOLIC PANEL (NA, K, CL, CO2, 
GLUCOSE, BUN, CREATININE, CA)2022 15:41:54





             Test Item    Value        Reference Range Interpretation Comments

 

             NA (test code = 148 mmol/L   135-145      H            



             4920004688)                                         

 

             K (test code = 4.3 mmol/L   3.5-5                     



             1866891137)                                         

 

             CL (test code = 123 mmol/L          H            



             2965148308)                                         

 

             CO2 TOTAL (test code = 19 mmol/L    23-31        L            



             7207654616)                                         

 

             AGAP (test code =              2-16                      



             1538430005)                                         

 

             BUN (test code = 23 mg/dL     7-23                      



             5273834587)                                         

 

             GLUCOSE (test code = 305 mg/dL           H            



             4071049295)                                         

 

             CREATININE (test code = 0.61 mg/dL   0.6-1.25                  



             1947817188)                                         

 

             CALCIUM (test code = 8.5 mg/dL    8.6-10.6     L            



             2099694572)                                         

 

             eGFR (test code =              mL/min/1.73m2              



             1290783005)                                         

 

             MAL (test code = MAL) Association of                           



                          Glomerular Filtration                           



                          Rate (GFR) and Staging                           



                          of Kidney Disease*                           



                          +---------------------                           



                          --+-------------------                           



                          --+-------------------                           



                          ------+| GFR                           



                          (mL/min/1.73 m2) ?|                           



                          With Kidney Damage ?|                           



                          ?Without Kidney                           



                          Damage+---------------                           



                          --------+-------------                           



                          --------+-------------                           



                          ------------+| ?>90 ?                           



                          ? ? ? ? ? ? ? ?|                           



                          ?Stage one ? ? ? ? ?|                           



                          ? Normal ? ? ? ? ? ? ?                           



                          ?+--------------------                           



                          ---+------------------                           



                          ---+------------------                           



                          -------+| ?60-89 ? ? ?                           



                          ? ? ? ? ?| ?Stage two                           



                          ? ? ? ? ?| ? Decreased                           



                          GFR ? ? ? ?                            



                          +---------------------                           



                          --+-------------------                           



                          --+-------------------                           



                          ------+| ?30-59 ? ? ?                           



                          ? ? ? ? ?| ?Stage                           



                          three ? ? ? ?| ? Stage                           



                          three ? ? ? ? ?                           



                          +---------------------                           



                          --+-------------------                           



                          --+-------------------                           



                          ------+| ?15-29 ? ? ?                           



                          ? ? ? ? ?| ?Stage four                           



                          ? ? ? ? | ? Stage four                           



                          ? ? ? ? ?                              



                          ?+--------------------                           



                          ---+------------------                           



                          ---+------------------                           



                          -------+| ?<15 (or                           



                          dialysis) ? ?| ?Stage                           



                          five ? ? ? ? | ? Stage                           



                          five ? ? ? ? ?                           



                          ?+--------------------                           



                          ---+------------------                           



                          ---+------------------                           



                          -------+ *Each stage                           



                          assumes the associated                           



                          GFR level has been in                           



                          effect for at least                           



                          three months. ?Stages                           



                          1 to 5, with or                           



                          without kidney                           



                          disease, indicate                           



                          chronic kidney                           



                          disease. Notes:                           



                          Determination of                           



                          stages one and two                           



                          (with eGFR                             



                          >59mL/min/1.73 m2)                           



                          requires estimation of                           



                          kidney damage for at                           



                          least three months as                           



                          defined by structural                           



                          or functional                           



                          abnormalities of the                           



                          kidney, manifested by                           



                          either:Pathological                           



                          abnormalities or                           



                          Markers of kidney                           



                          damage (including                           



                          abnormalities in the                           



                          composition of the                           



                          blood or urine or                           



                          abnormalities in                           



                          imaging tests).                           

 

             Lab Interpretation Abnormal                               



             (test code = 83436-7)                                        



[facility]





             Test Item    Value        Reference Range Interpretation Comments

 

             MAGNESIUM (test code = 6119632251) 1.8 mg/dL    1.7-2.4            

       

 

             Lab Interpretation (test code = Normal                             

    



             32142-8)                                            



[facility]





             Test Item    Value        Reference Range Interpretation Comments

 

             PHOSPHORUS (test code = 1037116083) 2.2 mg/dL    2.5-5        L    

        

 

             Lab Interpretation (test code = Abnormal                           

    



             82316-3)                                            



[facility] GLUCOSE (AUTOMATED)2022 12:48:51





             Test Item    Value        Reference Range Interpretation Comments

 

             POCT GLU (test code = 7677884055) 274 mg/dL           H      

      

 

             Lab Interpretation (test code = Abnormal                           

    



             22112-6)                                            



[facility] GLUCOSE (AUTOMATED)2022 01:10:30





             Test Item    Value        Reference Range Interpretation Comments

 

             POCT GLU (test code = 3297616509) 248 mg/dL           H      

      

 

             Lab Interpretation (test code = Abnormal                           

    



             88068-2)                                            



[facility] GLUCOSE (AUTOMATED)2022 01:10:30





             Test Item    Value        Reference Range Interpretation Comments

 

             POCT GLU (test code = 2646189392) 300 mg/dL           H      

      

 

             Lab Interpretation (test code = Abnormal                           

    



             05301-7)                                            



[facility] GLUCOSE (AUTOMATED)2022 16:52:44





             Test Item    Value        Reference Range Interpretation Comments

 

             POCT GLU (test code = 7247183435) 296 mg/dL           H      

      

 

             Lab Interpretation (test code = Abnormal                           

    



             15040-6)                                            



[facility] GLUCOSE (AUTOMATED)2022 16:52:43





             Test Item    Value        Reference Range Interpretation Comments

 

             POCT GLU (test code = 1824823351) 345 mg/dL           H      

      

 

             Lab Interpretation (test code = Abnormal                           

    



             54265-0)                                            



[facility] GLUCOSE (AUTOMATED)2022 16:52:38





             Test Item    Value        Reference Range Interpretation Comments

 

             POCT GLU (test code = 4965288477) 363 mg/dL           H      

      

 

             Lab Interpretation (test code = Abnormal                           

    



             48073-6)                                            



[facility] GLUCOSE (AUTOMATED)2022 16:52:38





             Test Item    Value        Reference Range Interpretation Comments

 

             POCT GLU (test code = 3880390567) 444 mg/dL           H      

      

 

             Lab Interpretation (test code = Abnormal                           

    



             15534-2)                                            



[facility] GLUCOSE (AUTOMATED)2022 16:52:38





             Test Item    Value        Reference Range Interpretation Comments

 

             POCT GLU (test code = 5506116688) 324 mg/dL           H      

      

 

             Lab Interpretation (test code = Abnormal                           

    



             42607-1)                                            



[facility] GLUCOSE (AUTOMATED)2022 16:52:38





             Test Item    Value        Reference Range Interpretation Comments

 

             POCT GLU (test code = 8948813546) 303 mg/dL           H      

      

 

             Lab Interpretation (test code = Abnormal                           

    



             04341-5)                                            



[facility] GLUCOSE (AUTOMATED)2022 16:52:38





             Test Item    Value        Reference Range Interpretation Comments

 

             POCT GLU (test code = 8186732814) 288 mg/dL           H      

      

 

             Lab Interpretation (test code = Abnormal                           

    



             25445-8)                                            



[facility] GLUCOSE (AUTOMATED)2022 16:37:12





             Test Item    Value        Reference Range Interpretation Comments

 

             POCT GLU (test code = 4856579517) 241 mg/dL           H      

      

 

             Lab Interpretation (test code = Abnormal                           

    



             69690-5)                                            



[facility] GLUCOSE (AUTOMATED)2022 13:14:11





             Test Item    Value        Reference Range Interpretation Comments

 

             POCT GLU (test code = 5777481814) 264 mg/dL           H      

      

 

             Lab Interpretation (test code = Abnormal                           

    



             66127-8)                                            



[facility] GLUCOSE (AUTOMATED)2022 11:04:52





             Test Item    Value        Reference Range Interpretation Comments

 

             POCT GLU (test code = 5017857381) 257 mg/dL           H      

      

 

             Lab Interpretation (test code = Abnormal                           

    



             48398-8)                                            



[facility] GLUCOSE (AUTOMATED)2022 07:51:27





             Test Item    Value        Reference Range Interpretation Comments

 

             POCT GLU (test code = 6273900039) 228 mg/dL           H      

      

 

             Lab Interpretation (test code = Abnormal                           

    



             03515-0)                                            



[facility] GLUCOSE (AUTOMATED)2022 03:43:14





             Test Item    Value        Reference Range Interpretation Comments

 

             POCT GLU (test code = 9652918543) 269 mg/dL           H      

      

 

             Lab Interpretation (test code = Abnormal                           

    



             27985-6)                                            



[facility] GLUCOSE (AUTOMATED)2022 00:53:27





             Test Item    Value        Reference Range Interpretation Comments

 

             POCT GLU (test code = 1364909218) 342 mg/dL           H      

      

 

             Lab Interpretation (test code = Abnormal                           

    



             22296-3)                                            



[facility] Metabolic Panel (Na, K, Cl, CO2, 
Glucose, BUN, Creatinine, Ca)2022 00:26:35





             Test Item    Value        Reference Range Interpretation Comments

 

             NA (test code = 161 mmol/L   135-145      HH           



             4831815906)                                         

 

             K (test code = 5.3 mmol/L   3.5-5        H            



             2872560118)                                         

 

             CL (test code = 131 mmol/L          H            



             7116165225)                                         

 

             CO2 TOTAL (test code = 14 mmol/L    23-31        L            



             5015792874)                                         

 

             AGAP (test code =              2-16                      



             8845003762)                                         

 

             BUN (test code = 40 mg/dL     7-23         H            



             7154455232)                                         

 

             GLUCOSE (test code = 363 mg/dL           H            



             8243740441)                                         

 

             CREATININE (test code = 1.31 mg/dL   0.6-1.25     H            



             1684886668)                                         

 

             CALCIUM (test code = 9.0 mg/dL    8.6-10.6                  



             3096540731)                                         

 

             eGFR (test code =              mL/min/1.73m2              



             4254110317)                                         

 

             MAL (test code = MAL) Association of                           



                          Glomerular Filtration                           



                          Rate (GFR) and Staging                           



                          of Kidney Disease*                           



                          +---------------------                           



                          --+-------------------                           



                          --+-------------------                           



                          ------+| GFR                           



                          (mL/min/1.73 m2) ?|                           



                          With Kidney Damage ?|                           



                          ?Without Kidney                           



                          Damage+---------------                           



                          --------+-------------                           



                          --------+-------------                           



                          ------------+| ?>90 ?                           



                          ? ? ? ? ? ? ? ?|                           



                          ?Stage one ? ? ? ? ?|                           



                          ? Normal ? ? ? ? ? ? ?                           



                          ?+--------------------                           



                          ---+------------------                           



                          ---+------------------                           



                          -------+| ?60-89 ? ? ?                           



                          ? ? ? ? ?| ?Stage two                           



                          ? ? ? ? ?| ? Decreased                           



                          GFR ? ? ? ?                            



                          +---------------------                           



                          --+-------------------                           



                          --+-------------------                           



                          ------+| ?30-59 ? ? ?                           



                          ? ? ? ? ?| ?Stage                           



                          three ? ? ? ?| ? Stage                           



                          three ? ? ? ? ?                           



                          +---------------------                           



                          --+-------------------                           



                          --+-------------------                           



                          ------+| ?15-29 ? ? ?                           



                          ? ? ? ? ?| ?Stage four                           



                          ? ? ? ? | ? Stage four                           



                          ? ? ? ? ?                              



                          ?+--------------------                           



                          ---+------------------                           



                          ---+------------------                           



                          -------+| ?<15 (or                           



                          dialysis) ? ?| ?Stage                           



                          five ? ? ? ? | ? Stage                           



                          five ? ? ? ? ?                           



                          ?+--------------------                           



                          ---+------------------                           



                          ---+------------------                           



                          -------+ *Each stage                           



                          assumes the associated                           



                          GFR level has been in                           



                          effect for at least                           



                          three months. ?Stages                           



                          1 to 5, with or                           



                          without kidney                           



                          disease, indicate                           



                          chronic kidney                           



                          disease. Notes:                           



                          Determination of                           



                          stages one and two                           



                          (with eGFR                             



                          >59mL/min/1.73 m2)                           



                          requires estimation of                           



                          kidney damage for at                           



                          least three months as                           



                          defined by structural                           



                          or functional                           



                          abnormalities of the                           



                          kidney, manifested by                           



                          either:Pathological                           



                          abnormalities or                           



                          Markers of kidney                           



                          damage (including                           



                          abnormalities in the                           



                          composition of the                           



                          blood or urine or                           



                          abnormalities in                           



                          imaging tests).                           

 

             Lab Interpretation Abnormal                               



             (test code = 36161-0)                                        



[facility] GLUCOSE (AUTOMATED)2022 22:31:31





             Test Item    Value        Reference Range Interpretation Comments

 

             POCT GLU (test code = 8984384572) 349 mg/dL           H      

      

 

             Lab Interpretation (test code = Abnormal                           

    



             81251-3)                                            



[facility] GLUCOSE (AUTOMATED)2022 20:57:05





             Test Item    Value        Reference Range Interpretation Comments

 

             POCT GLU (test code = 6877181890)                     HH     

      

 

             Lab Interpretation (test code = Abnormal                           

    



             71045-3)                                            



[facility] GLUCOSE (AUTOMATED)2022 20:57:00





             Test Item    Value        Reference Range Interpretation Comments

 

             POCT GLU (test code = 1089817882)                     HH     

      

 

             Lab Interpretation (test code = Abnormal                           

    



             67426-3)                                            



[facility] GLUCOSE (AUTOMATED)2022 20:57:00





             Test Item    Value        Reference Range Interpretation Comments

 

             POCT GLU (test code = 8976995500)                     HH     

      

 

             Lab Interpretation (test code = Abnormal                           

    



             33190-0)                                            



[facility] GLUCOSE (AUTOMATED)2022 20:57:00





             Test Item    Value        Reference Range Interpretation Comments

 

             POCT GLU (test code = 8164511748)                     HH     

      

 

             Lab Interpretation (test code = Abnormal                           

    



             62305-3)                                            



[facility] 12[facility]58:42





             Test Item    Value        Reference Range Interpretation Comments

 

             LACTIC ACID (test code = 4.32 mmol/L  0.5-2.2      H            



             9165583806)                                         

 

             Lab Interpretation (test code = Abnormal                           

    



             35473-6)                                            



[facility] 03:51:48





             Test Item    Value        Reference Range Interpretation Comments

 

             PHOSPHORUS (test code = 6.7 mg/dL    2.5-5        H            Slig

ht hemolysis



             9043643693)                                         

 

             Lab Interpretation (test Abnormal                               



             code = 72646-8)                                        



[facility]3601-19-33 03:51:48





             Test Item    Value        Reference Range Interpretation Comments

 

             MAGNESIUM (test code = 9740349737) 2.8 mg/dL    1.7-2.4      H     

       

 

             Lab Interpretation (test code = Abnormal                           

    



             63310-0)                                            



[facility]38097[facility] 
02:16:52





             Test Item    Value        Reference Range Interpretation Comments

 

             NA (test code = 166 mmol/L   135-145      HH           



             1822512442)                                         

 

             K (test code = 5.2 mmol/L   3.5-5        H            



             7603296575)                                         

 

             CL (test code = 123 mmol/L          H            



             3737370252)                                         

 

             CO2 TOTAL (test code = 12 mmol/L    23-31        L            



             5826182313)                                         

 

             AGAP (test code =              2-16         H            



             7330404911)                                         

 

             BUN (test code = 35 mg/dL     7-23         H            



             2284432923)                                         

 

             GLUCOSE (test code = 941 mg/dL           HH           



             6811303963)                                         

 

             CREATININE (test code = 1.51 mg/dL   0.6-1.25     H            



             9273774514)                                         

 

             TOTAL BILI (test code = 1.1 mg/dL    0.1-1.1                   



             6978785327)                                         

 

             CALCIUM (test code = 10.7 mg/dL   8.6-10.6     H            



             0239505255)                                         

 

             T PROTEIN (test code = 8.2 g/dL     6.3-8.2                   



             3364051189)                                         

 

             ALBUMIN (test code = 4.5 g/dL     3.5-5                     



             6120407931)                                         

 

             ALK PHOS (test code = 201 U/L             H            



             6468064183)                                         

 

             ALTv (test code = 43 U/L       5-50                      



             1742-6)                                             

 

             AST(SGOT) (test code = 27 U/L       13-40                     



             8980557949)                                         

 

             eGFR (test code =              mL/min/1.73m2              



             1683070015)                                         

 

             MAL (test code = MAL) Association of                           



                          Glomerular Filtration                           



                          Rate (GFR) and Staging                           



                          of Kidney Disease*                           



                          +---------------------                           



                          --+-------------------                           



                          --+-------------------                           



                          ------+| GFR                           



                          (mL/min/1.73 m2) ?|                           



                          With Kidney Damage ?|                           



                          ?Without Kidney                           



                          Damage+---------------                           



                          --------+-------------                           



                          --------+-------------                           



                          ------------+| ?>90 ?                           



                          ? ? ? ? ? ? ? ?|                           



                          ?Stage one ? ? ? ? ?|                           



                          ? Normal ? ? ? ? ? ? ?                           



                          ?+--------------------                           



                          ---+------------------                           



                          ---+------------------                           



                          -------+| ?60-89 ? ? ?                           



                          ? ? ? ? ?| ?Stage two                           



                          ? ? ? ? ?| ? Decreased                           



                          GFR ? ? ? ?                            



                          +---------------------                           



                          --+-------------------                           



                          --+-------------------                           



                          ------+| ?30-59 ? ? ?                           



                          ? ? ? ? ?| ?Stage                           



                          three ? ? ? ?| ? Stage                           



                          three ? ? ? ? ?                           



                          +---------------------                           



                          --+-------------------                           



                          --+-------------------                           



                          ------+| ?15-29 ? ? ?                           



                          ? ? ? ? ?| ?Stage four                           



                          ? ? ? ? | ? Stage four                           



                          ? ? ? ? ?                              



                          ?+--------------------                           



                          ---+------------------                           



                          ---+------------------                           



                          -------+| ?<15 (or                           



                          dialysis) ? ?| ?Stage                           



                          five ? ? ? ? | ? Stage                           



                          five ? ? ? ? ?                           



                          ?+--------------------                           



                          ---+------------------                           



                          ---+------------------                           



                          -------+ *Each stage                           



                          assumes the associated                           



                          GFR level has been in                           



                          effect for at least                           



                          three months. ?Stages                           



                          1 to 5, with or                           



                          without kidney                           



                          disease, indicate                           



                          chronic kidney                           



                          disease. Notes:                           



                          Determination of                           



                          stages one and two                           



                          (with eGFR                             



                          >59mL/min/1.73 m2)                           



                          requires estimation of                           



                          kidney damage for at                           



                          least three months as                           



                          defined by structural                           



                          or functional                           



                          abnormalities of the                           



                          kidney, manifested by                           



                          either:Pathological                           



                          abnormalities or                           



                          Markers of kidney                           



                          damage (including                           



                          abnormalities in the                           



                          composition of the                           



                          blood or urine or                           



                          abnormalities in                           



                          imaging tests).                           

 

             Lab Interpretation Abnormal                               



             (test code = 54521-5)                                        



[facility]





             Test Item    Value        Reference    Interpretation Comments



                                       Range                     

 

             TROPONIN I (test 0.005 ng/mL  See_Comment                [Automated



             code = 0231508838)                                        message] 

The



                                                                 system which



                                                                 generated this



                                                                 result



                                                                 transmitted



                                                                 reference range

:



                                                                 <=0.034. The



                                                                 reference range



                                                                 was not used to



                                                                 interpret this



                                                                 result as



                                                                 normal/abnormal

.

 

             MAL (test code = Reference (Normal)                           



             MAL)         Range (defined by                           



                          the 99th percentile                           



                          reference limit): <=                           



                          0.034 ng/mL Note:                           



                          Cardiac troponin                           



                          begins to rise 3-4                           



                          hours after the                           



                          onset of ischemia.                           



                          Repeat in 4-6 hours                           



                          if the sample was                           



                          drawn within 3-4                           



                          hours of the onset                           



                          of the symptom and                           



                          found normal.                           



                          Diagnosis of                           



                          myocardial injury is                           



                          made with acute                           



                          changes in cTn                           



                          concentrations with                           



                          at least one serial                           



                          sample above the                           



                          99th percentile                           



                          upper reference                           



                          limit (URL), taken                           



                          together with the                           



                          patient's clinical                           



                          presentation. Biotin                           



                          has been reported to                           



                          cause a negative                           



                          bias, interpret                           



                          results relative to                           



                          patient's use of                           



                          biotin.                                

 

             Lab Interpretation Normal                                 



             (test code =                                        



             47402-0)                                            



[facility]
SALICYLATE&lt;10mg/ 9:06 PM [facility] 
LABORATORYTherapeutic Range: ? ?? ? ? Analgesic and Antipyretic Use ? ? ? ? 20-
100 mg/L ? ? Anti-Inflammatory Use ? ? ? ? ? ? ? ? 100-250 mg/L Toxic Range: ? ?
? ? ? ? ? ? ? ? ? ? ? ? ? Greater than 300 mg/[facility]UnB[facility] CenterSALICYLATE20[facility] 02:06:11SALICYLATE&lt;10mg/ 9:06 PM 
[facility] LABORATORYTherapeutic Range: ? ?? ? ? Analgesic and
Antipyretic Use ? ? ? ?  mg/L ? ? Anti-Inflammatory Use ? ? ? ? ? ? ? ? 
100-250 mg/L Toxic Range: ? ? ? ? ? ? ? ? ? ? ? ? ? ? ? Greater than 300 mg/L
[facility]
ALCOHOL&lt;10mg/dL2022 9:05 PM [facility] 
LABORATORY&lt;10 [id number] Toxic&gt;100 Depression of CNS&gt;400 Fatalities
[facility]UnB[facility] CenterETHANOL2022-[facility] 02:05:51
ALCOHOL&lt;10mg/dL2022 9:05 PM [facility] 
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
[facility]UnB[facility] CenterACETAMINOPHEN2022-07-27 02:03:04





             Test Item    Value        Reference Range Interpretation Comments

 

             ACETAMINOP (test code =              10-30        L            



             3656007131)                                         

 

             MAL (test code = MAL) Toxic: Greater than                          

 



                          200 ug/mL @ 4 hour                           



                          post ingestion or                           



                          greater than 50                           



                          ug/mL @ 12 hour post                           



                          ingestion                              

 

             Lab Interpretation (test Abnormal                               



             code = 97876-4)                                        



[facility]:04





             Test Item    Value        Reference Range Interpretation Comments

 

             ACETAMINOP (test code =              10-30        L            



             4545472663)                                         

 

             MAL (test code = MAL) Toxic: Greater than                          

 



                          200 ug/mL @ 4 hour                           



                          post ingestion or                           



                          greater than 50                           



                          ug/mL @ 12 hour post                           



                          ingestion                              

 

             Lab Interpretation (test Abnormal                               



             code = 64925-0)                                        



[facility]





             Test Item    Value        Reference Range Interpretation Comments

 

             LIPASE (test code = 5658928579) 246 U/L      0-220        H        

    

 

             Lab Interpretation (test code = Abnormal                           

    



             10702-1)                                            



[facility] Hospital[id number] 01:12:41





             Test Item    Value        Reference Range Interpretation Comments

 

             WBC (test code =              See_Comment  H             [Automated



             6690-2)                                             message] The



                                                                 system which



                                                                 generated this



                                                                 result transmit

huma



                                                                 reference range

:



                                                                 4.20 - 10.70



                                                                 10*3/?L. The



                                                                 reference range



                                                                 was not used to



                                                                 interpret this



                                                                 result as



                                                                 normal/abnormal

.

 

             RBC (test code =              See_Comment  H             [Automated



             789-8)                                              message] The



                                                                 system which



                                                                 generated this



                                                                 result transmit

huma



                                                                 reference range

:



                                                                 4.26 - 5.52



                                                                 10*6/?L. The



                                                                 reference range



                                                                 was not used to



                                                                 interpret this



                                                                 result as



                                                                 normal/abnormal

.

 

             HGB (test code = 18.3 g/dL    12.2-16.4    H            



             718-7)                                              

 

             HCT (test code = 57.6 %       38.4-49.3    H            



             4544-3)                                             

 

             MCV (test code = 90.7 fL      81.7-95.6                 



             787-2)                                              

 

             MCH (test code = 28.8 pg      26.1-32.7                 



             785-6)                                              

 

             MCHC (test code = 31.8 g/dL    31.2-35                   



             786-4)                                              

 

             RDW-SD (test code = 50.8 fL      38.5-51.6                 



             50504-6)                                            

 

             RDW-CV (test code = 16.4 %       12.1-15.4    H            



             788-0)                                              

 

             PLT (test code =              See_Comment  H             [Automated



             777-3)                                              message] The



                                                                 system which



                                                                 generated this



                                                                 result transmit

huma



                                                                 reference range

:



                                                                 150 - 328 10*3/

?L.



                                                                 The reference



                                                                 range was not u

sed



                                                                 to interpret th

is



                                                                 result as



                                                                 normal/abnormal

.

 

             MPV (test code = 11.6 fL      9.8-13                    



             97573-3)                                            

 

             NRBC/100 WBC (test              See_Comment                [Automat

ed



             code = 9517241967)                                        message] 

The



                                                                 system which



                                                                 generated this



                                                                 result transmit

huma



                                                                 reference range

:



                                                                 0.0 - 10.0 /100



                                                                 WBCs. The



                                                                 reference range



                                                                 was not used to



                                                                 interpret this



                                                                 result as



                                                                 normal/abnormal

.

 

             NRBC x10^3 (test code              See_Comment                [Auto

mated



             = 8427437311)                                        message] The



                                                                 system which



                                                                 generated this



                                                                 result transmit

huma



                                                                 reference range

:



                                                                 10*3/?L. The



                                                                 reference range



                                                                 was not used to



                                                                 interpret this



                                                                 result as



                                                                 normal/abnormal

.

 

             SEG % (test code = 77 %         33-76        H            



             14822-5)                                            

 

             BAND % (test code = 9 %          0-1          H            



             98637-9)                                            

 

             LYMPH % (test code = 7 %          14-54        L            



             47823-8)                                            

 

             MONO % (test code = 7 %          0-4          H            



             26485-3)                                            

 

             ANC (test code = 15.98 10*3/uL 1.99-6.95    H            



             753-4)                                              

 

             OLENA CELLS (test code 2+           See_Comment  A             [Auto

mated



             = 7790-9)                                           message] The



                                                                 system which



                                                                 generated this



                                                                 result transmit

huma



                                                                 reference range

:



                                                                 (none). The



                                                                 reference range



                                                                 was not used to



                                                                 interpret this



                                                                 result as



                                                                 normal/abnormal

.

 

             Lab Interpretation Abnormal                               



             (test code = 08988-1)                                        



[facility] GLUCOSE (AUTOMATED)2022 01:30:22





             Test Item    Value        Reference Range Interpretation Comments

 

             POCT GLU (test code = 6130062799) 392 mg/dL           H      

      

 

             Lab Interpretation (test code = Abnormal                           

    



             32699-6)                                            



[facility] GLUCOSE(AGE &gt;30DAYS)2022 
01:30:00





             Test Item    Value        Reference Range Interpretation Comments

 

             POCT Glu (age>30days) 392 mg/dL,                      



             (test code = 3342) Singer informed                           

 

             Lab Interpretation (test Normal                                 



             code = 49345-5)                                        



[facility] GLUCOSE (AUTOMATED)2022 00:43:22





             Test Item    Value        Reference Range Interpretation Comments

 

             POCT GLU (test code = 8566596330) 430 mg/dL           H      

      

 

             Lab Interpretation (test code = Abnormal                           

    



             81034-8)                                            



[facility] GLUCOSE (AUTOMATED)2022-07-15 23:29:00





             Test Item    Value        Reference Range Interpretation Comments

 

             POCT GLU (test code = 3708012667) 493 mg/dL           HH     

      

 

             Lab Interpretation (test code = Abnormal                           

    



             41610-9)                                            



[facility] METABOLIC PANEL (NA, K, CL, CO2, 
GLUCOSE, BUN, CREATININE, CA)2022-07-15 22:56:10





             Test Item    Value        Reference Range Interpretation Comments

 

             NA (test code = 132 mmol/L   135-145      L            



             8085549037)                                         

 

             K (test code = 4.4 mmol/L   3.5-5                     



             5142180722)                                         

 

             CL (test code = 98 mmol/L                        



             3237503417)                                         

 

             CO2 TOTAL (test code = 19 mmol/L    23-31        L            



             6672756304)                                         

 

             AGAP (test code =              2-16                      



             7683055586)                                         

 

             BUN (test code = 11 mg/dL     7-23                      



             4514737182)                                         

 

             GLUCOSE (test code = 519 mg/dL           HH           



             4384186573)                                         

 

             CREATININE (test code = 0.55 mg/dL   0.6-1.25     L            



             9796991523)                                         

 

             CALCIUM (test code = 9.5 mg/dL    8.6-10.6                  



             5033875707)                                         

 

             eGFR (test code =              mL/min/1.73m2              



             4440266725)                                         

 

             MAL (test code = MAL) Association of                           



                          Glomerular Filtration                           



                          Rate (GFR) and Staging                           



                          of Kidney Disease*                           



                          +---------------------                           



                          --+-------------------                           



                          --+-------------------                           



                          ------+| GFR                           



                          (mL/min/1.73 m2) ?|                           



                          With Kidney Damage ?|                           



                          ?Without Kidney                           



                          Damage+---------------                           



                          --------+-------------                           



                          --------+-------------                           



                          ------------+| ?>90 ?                           



                          ? ? ? ? ? ? ? ?|                           



                          ?Stage one ? ? ? ? ?|                           



                          ? Normal ? ? ? ? ? ? ?                           



                          ?+--------------------                           



                          ---+------------------                           



                          ---+------------------                           



                          -------+| ?60-89 ? ? ?                           



                          ? ? ? ? ?| ?Stage two                           



                          ? ? ? ? ?| ? Decreased                           



                          GFR ? ? ? ?                            



                          +---------------------                           



                          --+-------------------                           



                          --+-------------------                           



                          ------+| ?30-59 ? ? ?                           



                          ? ? ? ? ?| ?Stage                           



                          three ? ? ? ?| ? Stage                           



                          three ? ? ? ? ?                           



                          +---------------------                           



                          --+-------------------                           



                          --+-------------------                           



                          ------+| ?15-29 ? ? ?                           



                          ? ? ? ? ?| ?Stage four                           



                          ? ? ? ? | ? Stage four                           



                          ? ? ? ? ?                              



                          ?+--------------------                           



                          ---+------------------                           



                          ---+------------------                           



                          -------+| ?<15 (or                           



                          dialysis) ? ?| ?Stage                           



                          five ? ? ? ? | ? Stage                           



                          five ? ? ? ? ?                           



                          ?+--------------------                           



                          ---+------------------                           



                          ---+------------------                           



                          -------+ *Each stage                           



                          assumes the associated                           



                          GFR level has been in                           



                          effect for at least                           



                          three months. ?Stages                           



                          1 to 5, with or                           



                          without kidney                           



                          disease, indicate                           



                          chronic kidney                           



                          disease. Notes:                           



                          Determination of                           



                          stages one and two                           



                          (with eGFR                             



                          >59mL/min/1.73 m2)                           



                          requires estimation of                           



                          kidney damage for at                           



                          least three months as                           



                          defined by structural                           



                          or functional                           



                          abnormalities of the                           



                          kidney, manifested by                           



                          either:Pathological                           



                          abnormalities or                           



                          Markers of kidney                           



                          damage (including                           



                          abnormalities in the                           



                          composition of the                           



                          blood or urine or                           



                          abnormalities in                           



                          imaging tests).                           

 

             Lab Interpretation Abnormal                               



             (test code = 58769-6)                                        



[facility] WITH [id number] 22:45:19





             Test Item    Value        Reference Range Interpretation Comments

 

             WBC (test code =              See_Comment                [Automated



             6590-2)                                             message] The sy

stem



                                                                 which generated



                                                                 this result



                                                                 transmitted



                                                                 reference range

:



                                                                 4.20 - 10.70



                                                                 10*3/?L. The



                                                                 reference range

 was



                                                                 not used to



                                                                 interpret this



                                                                 result as



                                                                 normal/abnormal

.

 

             RBC (test code =              See_Comment                [Automated



             559-8)                                              message] The sy

stem



                                                                 which generated



                                                                 this result



                                                                 transmitted



                                                                 reference range

:



                                                                 4.26 - 5.52



                                                                 10*6/?L. The



                                                                 reference range

 was



                                                                 not used to



                                                                 interpret this



                                                                 result as



                                                                 normal/abnormal

.

 

             HGB (test code = 15.7 g/dL    12.2-16.4                 



             718-7)                                              

 

             HCT (test code = 46.0 %       38.4-49.3                 



             4544-3)                                             

 

             MCV (test code = 85.0 fL      81.7-95.6                 



             787-2)                                              

 

             MCH (test code = 29.0 pg      26.1-32.7                 



             785-6)                                              

 

             MCHC (test code = 34.1 g/dL    31.2-35                   



             786-4)                                              

 

             RDW-SD (test code = 41.7 fL      38.5-51.6                 



             00959-7)                                            

 

             RDW-CV (test code = 13.6 %       12.1-15.4                 



             788-0)                                              

 

             PLT (test code =              See_Comment  H             [Automated



             027-3)                                              message] The sy

stem



                                                                 which generated



                                                                 this result



                                                                 transmitted



                                                                 reference range

:



                                                                 150 - 328 10*3/

?L.



                                                                 The reference r

zhen



                                                                 was not used to



                                                                 interpret this



                                                                 result as



                                                                 normal/abnormal

.

 

             MPV (test code = 10.6 fL      9.8-13                    



             12042-7)                                            

 

             NRBC/100 WBC (test              See_Comment                [Automat

ed



             code = 4228804775)                                        message] 

The system



                                                                 which generated



                                                                 this result



                                                                 transmitted



                                                                 reference range

:



                                                                 0.0 - 10.0 /100



                                                                 WBCs. The refer

ence



                                                                 range was not u

sed



                                                                 to interpret th

is



                                                                 result as



                                                                 normal/abnormal

.

 

             NRBC x10^3 (test code              See_Comment                [Auto

mated



             = [id number])                                        message] The s

ystem



                                                                 which generated



                                                                 this result



                                                                 transmitted



                                                                 reference range

:



                                                                 10*3/?L. The



                                                                 reference range

 was



                                                                 not used to



                                                                 interpret this



                                                                 result as



                                                                 normal/abnormal

.

 

             GRAN MAT (NEUT) % 66.6 %                                 



             (test code = 770-8)                                        

 

             IMM GRAN % (test code 0.40 %                                 



             = [id number])                                        

 

             LYMPH % (test code = 20.5 %                                 



             736-9)                                              

 

             MONO % (test code = 10.9 %                                 



             5905-5)                                             

 

             EOS % (test code = 1.5 %                                  



             713-8)                                              

 

             BASO % (test code = 0.1 %                                  



             706-2)                                              

 

             GRAN MAT x10^3(ANC) 4.88 10*3/uL 1.99-6.95                 



             (test code =                                        



             2242115923)                                         

 

             IMM GRAN x10^3 (test 0.03 10*3/uL 0-0.06                    



             code = [id number])                                        

 

             LYMPH x10^3 (test code 1.50 10*3/uL 1.09-3.23                 



             = 731-0)                                            

 

             MONO x10^3 (test code 0.80 10*3/uL 0.36-1.02                 



             = 742-7)                                            

 

             EOS x10^3 (test code = 0.11 10*3/uL 0.06-0.53                 



             711-2)                                              

 

             BASO x10^3 (test code              0.01-0.09                 



             = 704-7)                                            

 

             Lab Interpretation Abnormal                               



             (test code = 94303-2)                                        



[facility] WITH [id number] 11:05:43





             Test Item    Value        Reference Range Interpretation Comments

 

             WBC (test code =              See_Comment  H             [Automated



             6690-2)                                             message] The sy

stem



                                                                 which generated



                                                                 this result



                                                                 transmitted



                                                                 reference range

:



                                                                 4.20 - 10.70



                                                                 10*3/?L. The



                                                                 reference range

 was



                                                                 not used to



                                                                 interpret this



                                                                 result as



                                                                 normal/abnormal

.

 

             RBC (test code =              See_Comment                [Automated



             789-8)                                              message] The sy

stem



                                                                 which generated



                                                                 this result



                                                                 transmitted



                                                                 reference range

:



                                                                 4.26 - 5.52



                                                                 10*6/?L. The



                                                                 reference range

 was



                                                                 not used to



                                                                 interpret this



                                                                 result as



                                                                 normal/abnormal

.

 

             HGB (test code = 15.6 g/dL    12.2-16.4                 



             718-7)                                              

 

             HCT (test code = 46.3 %       38.4-49.3                 



             4544-3)                                             

 

             MCV (test code = 85.4 fL      81.7-95.6                 



             787-2)                                              

 

             MCH (test code = 28.8 pg      26.1-32.7                 



             785-6)                                              

 

             MCHC (test code = 33.7 g/dL    31.2-35.0                 



             786-4)                                              

 

             RDW-SD (test code = 41.7 fL      38.5-51.6                 



             68055-1)                                            

 

             RDW-CV (test code = 13.4 %       12.1-15.4                 



             788-0)                                              

 

             PLT (test code =              See_Comment  H             [Automated



             777-3)                                              message] The sy

stem



                                                                 which generated



                                                                 this result



                                                                 transmitted



                                                                 reference range

:



                                                                 150 - 328 10*3/

?L.



                                                                 The reference r

zhen



                                                                 was not used to



                                                                 interpret this



                                                                 result as



                                                                 normal/abnormal

.

 

             MPV (test code = 10.3 fL      9.8-13.0                  



             98199-1)                                            

 

             NRBC/100 WBC (test              See_Comment                [Automat

ed



             code = 9638926560)                                        message] 

The system



                                                                 which generated



                                                                 this result



                                                                 transmitted



                                                                 reference range

:



                                                                 0.0 - 10.0 /100



                                                                 WBCs. The refer

ence



                                                                 range was not u

sed



                                                                 to interpret th

is



                                                                 result as



                                                                 normal/abnormal

.

 

             NRBC x10^3 (test code <0.01        See_Comment                [Auto

mated



             = [id number])                                        message] The s

ystem



                                                                 which generated



                                                                 this result



                                                                 transmitted



                                                                 reference range

:



                                                                 10*3/?L. The



                                                                 reference range

 was



                                                                 not used to



                                                                 interpret this



                                                                 result as



                                                                 normal/abnormal

.

 

             GRAN MAT (NEUT) % 71.2 %                                 



             (test code = 770-8)                                        

 

             IMM GRAN % (test code 1.30 %                                 



             = 0591917004)                                        

 

             LYMPH % (test code = 18.2 %                                 



             736-9)                                              

 

             MONO % (test code = 7.1 %                                  



             5905-5)                                             

 

             EOS % (test code = 1.9 %                                  



             713-8)                                              

 

             BASO % (test code = 0.3 %                                  



             706-2)                                              

 

             GRAN MAT x10^3(ANC) 8.37 10*3/uL 1.99-6.95    H            



             (test code =                                        



             1768084926)                                         

 

             IMM GRAN x10^3 (test 0.15 10*3/uL 0.00-0.06    H            



             code = 6457137380)                                        

 

             LYMPH x10^3 (test code 2.14 10*3/uL 1.09-3.23                 



             = 731-0)                                            

 

             MONO x10^3 (test code 0.84 10*3/uL 0.36-1.02                 



             = 742-7)                                            

 

             EOS x10^3 (test code = 0.22 10*3/uL 0.06-0.53                 



             711-2)                                              

 

             BASO x10^3 (test code 0.04 10*3/uL 0.01-0.09                 



             = 704-7)                                            

 

             Lab Interpretation Abnormal                               



             (test code = 26952-6)                                        



[facility]. METABOLIC PANEL [id number] 
11:03:21





             Test Item    Value        Reference Range Interpretation Comments

 

             NA (test code = 133 mmol/L   135-145      L            



             8825224143)                                         

 

             K (test code = 4.3 mmol/L   3.5-5.0                   



             5315155350)                                         

 

             CL (test code = 99 mmol/L                        



             6963677404)                                         

 

             CO2 TOTAL (test code = 20 mmol/L    23-31        L            



             8754160668)                                         

 

             AGAP (test code =              2-16                      



             0812360778)                                         

 

             BUN (test code = 11 mg/dL     7-23                      



             5728013708)                                         

 

             GLUCOSE (test code = 357 mg/dL           H            



             2082316747)                                         

 

             CREATININE (test code = 0.52 mg/dL   0.60-1.25    L            



             [id number])                                         

 

             TOTAL BILI (test code = 0.7 mg/dL    0.1-1.1                   



             6903754203)                                         

 

             CALCIUM (test code = 9.6 mg/dL    8.6-10.6                  



             7950026169)                                         

 

             T PROTEIN (test code = 7.5 g/dL     6.3-8.2                   



             0984942570)                                         

 

             ALBUMIN (test code = 4.2 g/dL     3.5-5.0                   



             1915637347)                                         

 

             ALK PHOS (test code = 185 U/L             H            



             9850442666)                                         

 

             ALTv (test code = 78 U/L       5-50         H            



             1742-6)                                             

 

             AST(SGOT) (test code = 18 U/L       13-40                     



             7660932794)                                         

 

             eGFR (test code =              mL/min/1.73m2              



             9882266083)                                         

 

             MAL (test code = MAL) Association of                           



                          Glomerular Filtration                           



                          Rate (GFR) and Staging                           



                          of Kidney Disease*                           



                          +---------------------                           



                          --+-------------------                           



                          --+-------------------                           



                          ------+| GFR                           



                          (mL/min/1.73 m2) ?|                           



                          With Kidney Damage ?|                           



                          ?Without Kidney                           



                          Damage+---------------                           



                          --------+-------------                           



                          --------+-------------                           



                          ------------+| ?>90 ?                           



                          ? ? ? ? ? ? ? ?|                           



                          ?Stage one ? ? ? ? ?|                           



                          ? Normal ? ? ? ? ? ? ?                           



                          ?+--------------------                           



                          ---+------------------                           



                          ---+------------------                           



                          -------+| ?60-89 ? ? ?                           



                          ? ? ? ? ?| ?Stage two                           



                          ? ? ? ? ?| ? Decreased                           



                          GFR ? ? ? ?                            



                          +---------------------                           



                          --+-------------------                           



                          --+-------------------                           



                          ------+| ?30-59 ? ? ?                           



                          ? ? ? ? ?| ?Stage                           



                          three ? ? ? ?| ? Stage                           



                          three ? ? ? ? ?                           



                          +---------------------                           



                          --+-------------------                           



                          --+-------------------                           



                          ------+| ?15-29 ? ? ?                           



                          ? ? ? ? ?| ?Stage four                           



                          ? ? ? ? | ? Stage four                           



                          ? ? ? ? ?                              



                          ?+--------------------                           



                          ---+------------------                           



                          ---+------------------                           



                          -------+| ?<15 (or                           



                          dialysis) ? ?| ?Stage                           



                          five ? ? ? ? | ? Stage                           



                          five ? ? ? ? ?                           



                          ?+--------------------                           



                          ---+------------------                           



                          ---+------------------                           



                          -------+ *Each stage                           



                          assumes the associated                           



                          GFR level has been in                           



                          effect for at least                           



                          three months. ?Stages                           



                          1 to 5, with or                           



                          without kidney                           



                          disease, indicate                           



                          chronic kidney                           



                          disease. Notes:                           



                          Determination of                           



                          stages one and two                           



                          (with eGFR                             



                          >59mL/min/1.73 m2)                           



                          requires estimation of                           



                          kidney damage for at                           



                          least three months as                           



                          defined by structural                           



                          or functional                           



                          abnormalities of the                           



                          kidney, manifested by                           



                          either:Pathological                           



                          abnormalities or                           



                          Markers of kidney                           



                          damage (including                           



                          abnormalities in the                           



                          composition of the                           



                          blood or urine or                           



                          abnormalities in                           



                          imaging tests).                           

 

             Lab Interpretation Abnormal                               



             (test code = 58445-6)                                        



[facility]





             Test Item    Value        Reference Range Interpretation Comments

 

             LIPASE (test code = 3015590751) 63 U/L       0-220                 

    

 

             Lab Interpretation (test code = Normal                             

    



             58929-2)                                            



[facility]





             Test Item    Value        Reference Range Interpretation Comments

 

             POCT GLU (test code = 6091889591) 361 mg/dL           H      

      

 

             Lab Interpretation (test code = Abnormal                           

    



             79067-7)                                            



[facility] 12[facility]19:07





             Test Item    Value        Reference Range Interpretation Comments

 

             POCT GLU (test code = 2564016767) 390 mg/dL           H      

      

 

             Lab Interpretation (test code = Abnormal                           

    



             39022-2)                                            



[facility] GLUCOSE (AUTOMATED)2022 04:48:35





             Test Item    Value        Reference Range Interpretation Comments

 

             POCT GLU (test code = 7260010469) 412 mg/dL           H      

      

 

             Lab Interpretation (test code = Abnormal                           

    



             10637-9)                                            



[facility] GLUCOSE (AUTOMATED)2022 01:44:33





             Test Item    Value        Reference Range Interpretation Comments

 

             POCT GLU (test code = 3690557026) 376 mg/dL           H      

      

 

             Lab Interpretation (test code = Abnormal                           

    



             58587-0)                                            



[facility] GLUCOSE (AUTOMATED)2022 21:51:37





             Test Item    Value        Reference Range Interpretation Comments

 

             POCT GLU (test code = 9806799640) 267 mg/dL           H      

      

 

             Lab Interpretation (test code = Abnormal                           

    



             76207-8)                                            



[facility] GLUCOSE (AUTOMATED)2022 17:05:21





             Test Item    Value        Reference Range Interpretation Comments

 

             POCT GLU (test code = 6463313109) 377 mg/dL           H      

      

 

             Lab Interpretation (test code = Abnormal                           

    



             52254-6)                                            



[facility] GLUCOSE (AUTOMATED)2022 13:10:54





             Test Item    Value        Reference Range Interpretation Comments

 

             POCT GLU (test code = 5963374647) 495 mg/dL           HH     

      

 

             Lab Interpretation (test code = Abnormal                           

    



             42511-9)                                            



[facility] GLUCOSE (AUTOMATED)2022 08:34:04





             Test Item    Value        Reference Range Interpretation Comments

 

             POCT GLU (test code = 3611170330) 427 mg/dL           H      

      

 

             Lab Interpretation (test code = Abnormal                           

    



             39144-2)                                            



[facility] GLUCOSE (AUTOMATED)2022 05:46:02





             Test Item    Value        Reference Range Interpretation Comments

 

             POCT GLU (test code = 5563695541) 451 mg/dL           HH     

      

 

             Lab Interpretation (test code = Abnormal                           

    



             95083-2)                                            



[facility] GLUCOSE (AUTOMATED)2022 02:44:38





             Test Item    Value        Reference Range Interpretation Comments

 

             POCT GLU (test code = 3495108065) 429 mg/dL           H      

      

 

             Lab Interpretation (test code = Abnormal                           

    



             49293-1)                                            



[facility] GLUCOSE (AUTOMATED)2022 22:38:24





             Test Item    Value        Reference Range Interpretation Comments

 

             POCT GLU (test code = 4886039008) 404 mg/dL           H      

      

 

             Lab Interpretation (test code = Abnormal                           

    



             49685-8)                                            



[facility] GLUCOSE (AUTOMATED)2022 18:05:20





             Test Item    Value        Reference Range Interpretation Comments

 

             POCT GLU (test code = 6704098696) 423 mg/dL           H      

      

 

             Lab Interpretation (test code = Abnormal                           

    



             72315-3)                                            



[facility] METABOLIC PANEL (NA, K, CL, CO2, 
GLUCOSE, BUN, CREATININE, CA)2022 14:56:25





             Test Item    Value        Reference Range Interpretation Comments

 

             NA (test code = 135 mmol/L   135-145                   



             7077087857)                                         

 

             K (test code = 4.0 mmol/L   3.5-5.0                   



             7516152458)                                         

 

             CL (test code = 102 mmol/L                       



             0813226705)                                         

 

             CO2 TOTAL (test code = 22 mmol/L    23-31        L            



             5945363784)                                         

 

             AGAP (test code =              2-16                      



             0097451568)                                         

 

             BUN (test code = 13 mg/dL     7-23                      



             1050405388)                                         

 

             GLUCOSE (test code = 547 mg/dL           HH           



             7654060250)                                         

 

             CREATININE (test code = 0.65 mg/dL   0.60-1.25                 



             1705839944)                                         

 

             CALCIUM (test code = 8.6 mg/dL    8.6-10.6                  



             1495454972)                                         

 

             eGFR (test code =              mL/min/1.73m2              



             6364185517)                                         

 

             MAL (test code = MAL) Association of                           



                          Glomerular Filtration                           



                          Rate (GFR) and Staging                           



                          of Kidney Disease*                           



                          +---------------------                           



                          --+-------------------                           



                          --+-------------------                           



                          ------+| GFR                           



                          (mL/min/1.73 m2) ?|                           



                          With Kidney Damage ?|                           



                          ?Without Kidney                           



                          Damage+---------------                           



                          --------+-------------                           



                          --------+-------------                           



                          ------------+| ?>90 ?                           



                          ? ? ? ? ? ? ? ?|                           



                          ?Stage one ? ? ? ? ?|                           



                          ? Normal ? ? ? ? ? ? ?                           



                          ?+--------------------                           



                          ---+------------------                           



                          ---+------------------                           



                          -------+| ?60-89 ? ? ?                           



                          ? ? ? ? ?| ?Stage two                           



                          ? ? ? ? ?| ? Decreased                           



                          GFR ? ? ? ?                            



                          +---------------------                           



                          --+-------------------                           



                          --+-------------------                           



                          ------+| ?30-59 ? ? ?                           



                          ? ? ? ? ?| ?Stage                           



                          three ? ? ? ?| ? Stage                           



                          three ? ? ? ? ?                           



                          +---------------------                           



                          --+-------------------                           



                          --+-------------------                           



                          ------+| ?15-29 ? ? ?                           



                          ? ? ? ? ?| ?Stage four                           



                          ? ? ? ? | ? Stage four                           



                          ? ? ? ? ?                              



                          ?+--------------------                           



                          ---+------------------                           



                          ---+------------------                           



                          -------+| ?<15 (or                           



                          dialysis) ? ?| ?Stage                           



                          five ? ? ? ? | ? Stage                           



                          five ? ? ? ? ?                           



                          ?+--------------------                           



                          ---+------------------                           



                          ---+------------------                           



                          -------+ *Each stage                           



                          assumes the associated                           



                          GFR level has been in                           



                          effect for at least                           



                          three months. ?Stages                           



                          1 to 5, with or                           



                          without kidney                           



                          disease, indicate                           



                          chronic kidney                           



                          disease. Notes:                           



                          Determination of                           



                          stages one and two                           



                          (with eGFR                             



                          >59mL/min/1.73 m2)                           



                          requires estimation of                           



                          kidney damage for at                           



                          least three months as                           



                          defined by structural                           



                          or functional                           



                          abnormalities of the                           



                          kidney, manifested by                           



                          either:Pathological                           



                          abnormalities or                           



                          Markers of kidney                           



                          damage (including                           



                          abnormalities in the                           



                          composition of the                           



                          blood or urine or                           



                          abnormalities in                           



                          imaging tests).                           

 

             Lab Interpretation Abnormal                               



             (test code = 99306-6)                                        



[facility] GLUCOSE (AUTOMATED)2022 13:36:27





             Test Item    Value        Reference Range Interpretation Comments

 

             POCT GLU (test code = 5566536913) 526 mg/dL           HH     

      

 

             Lab Interpretation (test code = Abnormal                           

    



             66104-0)                                            



[facility] GLUCOSE (AUTOMATED)2022 12:52:44





             Test Item    Value        Reference Range Interpretation Comments

 

             POCT GLU (test code = 7049087839) >600                HH     

      

 

             Lab Interpretation (test code = Abnormal                           

    



             10806-4)                                            



[facility] GLUCOSE (AUTOMATED)2022 12:52:44





             Test Item    Value        Reference Range Interpretation Comments

 

             POCT GLU (test code = 5383522267) >600                HH     

      

 

             Lab Interpretation (test code = Abnormal                           

    



             04139-9)                                            



[facility] GLUCOSE (AUTOMATED)2022 11:02:02





             Test Item    Value        Reference Range Interpretation Comments

 

             POCT GLU (test code = 8785954317) 521 mg/dL           HH     

      

 

             Lab Interpretation (test code = Abnormal                           

    



             81969-8)                                            



[facility] GLUCOSE (AUTOMATED)2022 07:22:18





             Test Item    Value        Reference Range Interpretation Comments

 

             POCT GLU (test code = 4350424124) 534 mg/dL           HH     

      

 

             Lab Interpretation (test code = Abnormal                           

    



             89627-6)                                            



[facility], K, CL, CO2, 
GLUCOSE, BUN, CREATININE, CA)2022 06:34:51





             Test Item    Value        Reference Range Interpretation Comments

 

             NA (test code = 133 mmol/L   135-145      L            



             6512284742)                                         

 

             K (test code = 4.9 mmol/L   3.5-5.0                   Slight



             0800252886)                                         hemolysis

 

             CL (test code = 96 mmol/L           L            



             8009350634)                                         

 

             CO2 TOTAL (test code 21 mmol/L    23-31        L            



             = 5984818095)                                        

 

             AGAP (test code =              2-16                      



             6970388467)                                         

 

             BUN (test code = 12 mg/dL     7-23                      Slight



             6408373050)                                         hemolysis

 

             GLUCOSE (test code = 639 mg/dL           HH           



             0052207351)                                         

 

             CREATININE (test code 0.46 mg/dL   0.60-1.25    L            



             = 8661406584)                                        

 

             CALCIUM (test code = 9.7 mg/dL    8.6-10.6                  



             2875907625)                                         

 

             eGFR (test code =              mL/min/1.73m2              



             8335339844)                                         

 

             MAL (test code = MAL) Association of                           



                          Glomerular                             



                          Filtration Rate                           



                          (GFR) and Staging                           



                          of Kidney Disease*                           



                          +------------------                           



                          -----+-------------                           



                          --------+----------                           



                          ---------------+|                           



                          GFR (mL/min/1.73                           



                          m2) ?| With Kidney                           



                          Damage ?| ?Without                           



                          Kidney                                 



                          Damage+------------                           



                          -----------+-------                           



                          --------------+----                           



                          -------------------                           



                          --+| ?>90 ? ? ? ? ?                           



                          ? ? ? ?| ?Stage one                           



                          ? ? ? ? ?| ? Normal                           



                          ? ? ? ? ? ? ?                           



                          ?+-----------------                           



                          ------+------------                           



                          ---------+---------                           



                          ----------------+|                           



                          ?60-89 ? ? ? ? ? ?                           



                          ? ?| ?Stage two ? ?                           



                          ? ? ?| ? Decreased                           



                          GFR ? ? ? ?                            



                          +------------------                           



                          -----+-------------                           



                          --------+----------                           



                          ---------------+|                           



                          ?30-59 ? ? ? ? ? ?                           



                          ? ?| ?Stage three ?                           



                          ? ? ?| ? Stage                           



                          three ? ? ? ? ?                           



                          +------------------                           



                          -----+-------------                           



                          --------+----------                           



                          ---------------+|                           



                          ?15-29 ? ? ? ? ? ?                           



                          ? ?| ?Stage four ?                           



                          ? ? ? | ? Stage                           



                          four ? ? ? ? ?                           



                          ?+-----------------                           



                          ------+------------                           



                          ---------+---------                           



                          ----------------+|                           



                          ?<15 (or dialysis)                           



                          ? ?| ?Stage five ?                           



                          ? ? ? | ? Stage                           



                          five ? ? ? ? ?                           



                          ?+-----------------                           



                          ------+------------                           



                          ---------+---------                           



                          ----------------+                           



                          *Each stage assumes                           



                          the associated GFR                           



                          level has been in                           



                          effect for at least                           



                          three months.                           



                          ?Stages 1 to 5,                           



                          with or without                           



                          kidney disease,                           



                          indicate chronic                           



                          kidney disease.                           



                          Notes:                                 



                          Determination of                           



                          stages one and two                           



                          (with eGFR                             



                          >59mL/min/1.73 m2)                           



                          requires estimation                           



                          of kidney damage                           



                          for at least three                           



                          months as defined                           



                          by structural or                           



                          functional                             



                          abnormalities of                           



                          the kidney,                            



                          manifested by                           



                          either:Pathological                           



                          abnormalities or                           



                          Markers of kidney                           



                          damage (including                           



                          abnormalities in                           



                          the composition of                           



                          the blood or urine                           



                          or abnormalities in                           



                          imaging tests).                           

 

             Lab Interpretation Abnormal                               



             (test code = 08196-4)                                        



[facility]. METABOLIC PANEL ([id number])2022 
02:59:57





             Test Item    Value        Reference Range Interpretation Comments

 

             NA (test code = 126 mmol/L   135-145      L            



             3763479732)                                         

 

             K (test code = 5.2 mmol/L   3.5-5.0      H            



             2335057908)                                         

 

             CL (test code = 89 mmol/L           L            



             1018932555)                                         

 

             CO2 TOTAL (test code = 20 mmol/L    23-31        L            



             3788862685)                                         

 

             AGAP (test code =              2-16         H            



             5969025137)                                         

 

             BUN (test code = 12 mg/dL     7-23                      



             1970888549)                                         

 

             GLUCOSE (test code = 856 mg/dL           HH           



             9207328400)                                         

 

             CREATININE (test code = 0.49 mg/dL   0.60-1.25    L            



             8454101734)                                         

 

             TOTAL BILI (test code = 0.7 mg/dL    0.1-1.1                   



             0760196054)                                         

 

             CALCIUM (test code = 10.2 mg/dL   8.6-10.6                  



             0069195773)                                         

 

             T PROTEIN (test code = 8.2 g/dL     6.3-8.2                   



             8530596172)                                         

 

             ALBUMIN (test code = 4.5 g/dL     3.5-5.0                   



             4030773694)                                         

 

             ALK PHOS (test code = 186 U/L             H            



             5105860292)                                         

 

             ALTv (test code = 27 U/L       5-50                      



             1742-6)                                             

 

             AST(SGOT) (test code = 18 U/L       13-40                     



             2165018158)                                         

 

             eGFR (test code =              mL/min/1.73m2              



             8349161294)                                         

 

             MAL (test code = MAL) Association of                           



                          Glomerular Filtration                           



                          Rate (GFR) and Staging                           



                          of Kidney Disease*                           



                          +---------------------                           



                          --+-------------------                           



                          --+-------------------                           



                          ------+| GFR                           



                          (mL/min/1.73 m2) ?|                           



                          With Kidney Damage ?|                           



                          ?Without Kidney                           



                          Damage+---------------                           



                          --------+-------------                           



                          --------+-------------                           



                          ------------+| ?>90 ?                           



                          ? ? ? ? ? ? ? ?|                           



                          ?Stage one ? ? ? ? ?|                           



                          ? Normal ? ? ? ? ? ? ?                           



                          ?+--------------------                           



                          ---+------------------                           



                          ---+------------------                           



                          -------+| ?60-89 ? ? ?                           



                          ? ? ? ? ?| ?Stage two                           



                          ? ? ? ? ?| ? Decreased                           



                          GFR ? ? ? ?                            



                          +---------------------                           



                          --+-------------------                           



                          --+-------------------                           



                          ------+| ?30-59 ? ? ?                           



                          ? ? ? ? ?| ?Stage                           



                          three ? ? ? ?| ? Stage                           



                          three ? ? ? ? ?                           



                          +---------------------                           



                          --+-------------------                           



                          --+-------------------                           



                          ------+| ?15-29 ? ? ?                           



                          ? ? ? ? ?| ?Stage four                           



                          ? ? ? ? | ? Stage four                           



                          ? ? ? ? ?                              



                          ?+--------------------                           



                          ---+------------------                           



                          ---+------------------                           



                          -------+| ?<15 (or                           



                          dialysis) ? ?| ?Stage                           



                          five ? ? ? ? | ? Stage                           



                          five ? ? ? ? ?                           



                          ?+--------------------                           



                          ---+------------------                           



                          ---+------------------                           



                          -------+ *Each stage                           



                          assumes the associated                           



                          GFR level has been in                           



                          effect for at least                           



                          three months. ?Stages                           



                          1 to 5, with or                           



                          without kidney                           



                          disease, indicate                           



                          chronic kidney                           



                          disease. Notes:                           



                          Determination of                           



                          stages one and two                           



                          (with eGFR                             



                          >59mL/min/1.73 m2)                           



                          requires estimation of                           



                          kidney damage for at                           



                          least three months as                           



                          defined by structural                           



                          or functional                           



                          abnormalities of the                           



                          kidney, manifested by                           



                          either:Pathological                           



                          abnormalities or                           



                          Markers of kidney                           



                          damage (including                           



                          abnormalities in the                           



                          composition of the                           



                          blood or urine or                           



                          abnormalities in                           



                          imaging tests).                           

 

             Lab Interpretation Abnormal                               



             (test code = 92175-3)                                        



[facility]





             Test Item    Value        Reference Range Interpretation Comments

 

             LIPASE (test code = 6496958848) 146 U/L      0-220                 

    

 

             Lab Interpretation (test code = Normal                             

    



             41706-9)                                            



[id number]07





             Test Item    Value        Reference Range Interpretation Comments

 

             WBC (test code =              See_Comment                [Automated



             0985-2)                                             message] The sy

stem



                                                                 which generated



                                                                 this result



                                                                 transmitted



                                                                 reference range

:



                                                                 4.20 - 10.70



                                                                 10*3/?L. The



                                                                 reference range

 was



                                                                 not used to



                                                                 interpret this



                                                                 result as



                                                                 normal/abnormal

.

 

             RBC (test code =              See_Comment                [Automated



             310-9)                                              message] The sy

stem



                                                                 which generated



                                                                 this result



                                                                 transmitted



                                                                 reference range

:



                                                                 4.26 - 5.52



                                                                 10*6/?L. The



                                                                 reference range

 was



                                                                 not used to



                                                                 interpret this



                                                                 result as



                                                                 normal/abnormal

.

 

             HGB (test code = 16.1 g/dL    12.2-16.4                 



             718-7)                                              

 

             HCT (test code = 47.5 %       38.4-49.3                 



             4544-3)                                             

 

             MCV (test code = 86.2 fL      81.7-95.6                 



             787-2)                                              

 

             MCH (test code = 29.2 pg      26.1-32.7                 



             785-6)                                              

 

             MCHC (test code = 33.9 g/dL    31.2-35.0                 



             786-4)                                              

 

             RDW-SD (test code = 42.0 fL      38.5-51.6                 



             97514-3)                                            

 

             RDW-CV (test code = 13.5 %       12.1-15.4                 



             788-0)                                              

 

             PLT (test code =              See_Comment  H             [Automated



             777-3)                                              message] The sy

stem



                                                                 which generated



                                                                 this result



                                                                 transmitted



                                                                 reference range

:



                                                                 150 - 328 10*3/

?L.



                                                                 The reference r

zhen



                                                                 was not used to



                                                                 interpret this



                                                                 result as



                                                                 normal/abnormal

.

 

             MPV (test code = 10.5 fL      9.8-13.0                  



             56219-4)                                            

 

             NRBC/100 WBC (test              See_Comment                [Automat

ed



             code = 9217069502)                                        message] 

The system



                                                                 which generated



                                                                 this result



                                                                 transmitted



                                                                 reference range

:



                                                                 0.0 - 10.0 /100



                                                                 WBCs. The refer

ence



                                                                 range was not u

sed



                                                                 to interpret th

is



                                                                 result as



                                                                 normal/abnormal

.

 

             NRBC x10^3 (test code <0.01        See_Comment                [Auto

mated



             = 6010213271)                                        message] The s

ystem



                                                                 which generated



                                                                 this result



                                                                 transmitted



                                                                 reference range

:



                                                                 10*3/?L. The



                                                                 reference range

 was



                                                                 not used to



                                                                 interpret this



                                                                 result as



                                                                 normal/abnormal

.

 

             GRAN MAT (NEUT) % 67.5 %                                 



             (test code = 770-8)                                        

 

             IMM GRAN % (test code 0.70 %                                 



             = 0715709567)                                        

 

             LYMPH % (test code = 21.7 %                                 



             736-9)                                              

 

             MONO % (test code = 8.4 %                                  



             5905-5)                                             

 

             EOS % (test code = 1.3 %                                  



             713-8)                                              

 

             BASO % (test code = 0.4 %                                  



             706-2)                                              

 

             GRAN MAT x10^3(ANC) 6.61 10*3/uL 1.99-6.95                 



             (test code =                                        



             5310718982)                                         

 

             IMM GRAN x10^3 (test 0.07 10*3/uL 0.00-0.06    H            



             code = 4295061416)                                        

 

             LYMPH x10^3 (test code 2.12 10*3/uL 1.09-3.23                 



             = 731-0)                                            

 

             MONO x10^3 (test code 0.82 10*3/uL 0.36-1.02                 



             = 742-7)                                            

 

             EOS x10^3 (test code = 0.13 10*3/uL 0.06-0.53                 



             711-2)                                              

 

             BASO x10^3 (test code 0.04 10*3/uL 0.01-0.09                 



             = 704-7)                                            

 

             Lab Interpretation Abnormal                               



             (test code = 30300-9)                                        



[id number]ALB,T.PRO,BILI
T,BU/BC,ALT,AST,ALK PHOS)2022 13:57:09





             Test Item    Value        Reference Range Interpretation Comments

 

             TOTAL BILI (test code = 6932298594) 0.7 mg/dL    0.1-1.1           

        

 

             BILI UNCON (test code = 3085582169) 0.3 mg/dL    0.1-1.1           

        

 

             BILI CONJ (test code = 8768449880) 0.0 mg/dL    0.0-0.3            

       

 

             T PROTEIN (test code = 0318677306) 7.5 g/dL     6.3-8.2            

       

 

             ALBUMIN (test code = 9571312832) 3.8 g/dL     3.5-5.0              

     

 

             ALK PHOS (test code = 0442233486) 161 U/L             H      

      

 

             ALTv (test code = 1742-6) 44 U/L       5-50                      

 

             AST(SGOT) (test code = 3778682837) 47 U/L       13-40        H     

       

 

             Lab Interpretation (test code = Abnormal                           

    



             72820-8)                                            



[facility] CL, CO2, 
GLUCOSE, BUN, CREATININE, CA)2022 10:48:59





             Test Item    Value        Reference Range Interpretation Comments

 

             NA (test code = 136 mmol/L   135-145                   



             6974553030)                                         

 

             K (test code = 3.8 mmol/L   3.5-5.0                   



             7719125483)                                         

 

             CL (test code = 101 mmol/L                       



             1009910863)                                         

 

             CO2 TOTAL (test code = 22 mmol/L    23-31        L            



             5531766990)                                         

 

             AGAP (test code =              2-16                      



             2244408637)                                         

 

             BUN (test code = 16 mg/dL     7-23                      



             9107688603)                                         

 

             GLUCOSE (test code = 358 mg/dL           H            



             5887197822)                                         

 

             CREATININE (test code = 0.63 mg/dL   0.60-1.25                 



             9443559348)                                         

 

             CALCIUM (test code = 9.2 mg/dL    8.6-10.6                  



             9274138482)                                         

 

             eGFR (test code =              mL/min/1.73m2              



             5075519701)                                         

 

             MAL (test code = MAL) Association of                           



                          Glomerular Filtration                           



                          Rate (GFR) and Staging                           



                          of Kidney Disease*                           



                          +---------------------                           



                          --+-------------------                           



                          --+-------------------                           



                          ------+| GFR                           



                          (mL/min/1.73 m2) ?|                           



                          With Kidney Damage ?|                           



                          ?Without Kidney                           



                          Damage+---------------                           



                          --------+-------------                           



                          --------+-------------                           



                          ------------+| ?>90 ?                           



                          ? ? ? ? ? ? ? ?|                           



                          ?Stage one ? ? ? ? ?|                           



                          ? Normal ? ? ? ? ? ? ?                           



                          ?+--------------------                           



                          ---+------------------                           



                          ---+------------------                           



                          -------+| ?60-89 ? ? ?                           



                          ? ? ? ? ?| ?Stage two                           



                          ? ? ? ? ?| ? Decreased                           



                          GFR ? ? ? ?                            



                          +---------------------                           



                          --+-------------------                           



                          --+-------------------                           



                          ------+| ?30-59 ? ? ?                           



                          ? ? ? ? ?| ?Stage                           



                          three ? ? ? ?| ? Stage                           



                          three ? ? ? ? ?                           



                          +---------------------                           



                          --+-------------------                           



                          --+-------------------                           



                          ------+| ?15-29 ? ? ?                           



                          ? ? ? ? ?| ?Stage four                           



                          ? ? ? ? | ? Stage four                           



                          ? ? ? ? ?                              



                          ?+--------------------                           



                          ---+------------------                           



                          ---+------------------                           



                          -------+| ?<15 (or                           



                          dialysis) ? ?| ?Stage                           



                          five ? ? ? ? | ? Stage                           



                          five ? ? ? ? ?                           



                          ?+--------------------                           



                          ---+------------------                           



                          ---+------------------                           



                          -------+ *Each stage                           



                          assumes the associated                           



                          GFR level has been in                           



                          effect for at least                           



                          three months. ?Stages                           



                          1 to 5, with or                           



                          without kidney                           



                          disease, indicate                           



                          chronic kidney                           



                          disease. Notes:                           



                          Determination of                           



                          stages one and two                           



                          (with eGFR                             



                          >59mL/min/1.73 m2)                           



                          requires estimation of                           



                          kidney damage for at                           



                          least three months as                           



                          defined by structural                           



                          or functional                           



                          abnormalities of the                           



                          kidney, manifested by                           



                          either:Pathological                           



                          abnormalities or                           



                          Markers of kidney                           



                          damage (including                           



                          abnormalities in the                           



                          composition of the                           



                          blood or urine or                           



                          abnormalities in                           



                          imaging tests).                           

 

             Lab Interpretation Abnormal                               



             (test code = 41051-2)                                        



[facility] WITH [id number] 10:01:35





             Test Item    Value        Reference Range Interpretation Comments

 

             WBC (test code =              See_Comment                [Automated



             6690-2)                                             message] The sy

stem



                                                                 which generated



                                                                 this result



                                                                 transmitted



                                                                 reference range

:



                                                                 4.20 - 10.70



                                                                 10*3/?L. The



                                                                 reference range

 was



                                                                 not used to



                                                                 interpret this



                                                                 result as



                                                                 normal/abnormal

.

 

             RBC (test code =              See_Comment                [Automated



             789-8)                                              message] The sy

stem



                                                                 which generated



                                                                 this result



                                                                 transmitted



                                                                 reference range

:



                                                                 4.26 - 5.52



                                                                 10*6/?L. The



                                                                 reference range

 was



                                                                 not used to



                                                                 interpret this



                                                                 result as



                                                                 normal/abnormal

.

 

             HGB (test code = 14.9 g/dL    12.2-16.4                 



             718-7)                                              

 

             HCT (test code = 43.2 %       38.4-49.3                 



             4544-3)                                             

 

             MCV (test code = 86.1 fL      81.7-95.6                 



             787-2)                                              

 

             MCH (test code = 29.7 pg      26.1-32.7                 



             785-6)                                              

 

             MCHC (test code = 34.5 g/dL    31.2-35.0                 



             786-4)                                              

 

             RDW-SD (test code = 41.6 fL      38.5-51.6                 



             25108-7)                                            

 

             RDW-CV (test code = 13.4 %       12.1-15.4                 



             788-0)                                              

 

             PLT (test code =              See_Comment  H             [Automated



             777-3)                                              message] The sy

stem



                                                                 which generated



                                                                 this result



                                                                 transmitted



                                                                 reference range

:



                                                                 150 - 328 10*3/

?L.



                                                                 The reference r

zhen



                                                                 was not used to



                                                                 interpret this



                                                                 result as



                                                                 normal/abnormal

.

 

             MPV (test code = 11.0 fL      9.8-13.0                  



             80355-9)                                            

 

             NRBC/100 WBC (test              See_Comment                [Automat

ed



             code = 1101904656)                                        message] 

The system



                                                                 which generated



                                                                 this result



                                                                 transmitted



                                                                 reference range

:



                                                                 0.0 - 10.0 /100



                                                                 WBCs. The refer

ence



                                                                 range was not u

sed



                                                                 to interpret th

is



                                                                 result as



                                                                 normal/abnormal

.

 

             NRBC x10^3 (test code <0.01        See_Comment                [Auto

mated



             = 6526200641)                                        message] The s

ystem



                                                                 which generated



                                                                 this result



                                                                 transmitted



                                                                 reference range

:



                                                                 10*3/?L. The



                                                                 reference range

 was



                                                                 not used to



                                                                 interpret this



                                                                 result as



                                                                 normal/abnormal

.

 

             GRAN MAT (NEUT) % 62.6 %                                 



             (test code = 770-8)                                        

 

             IMM GRAN % (test code 0.40 %                                 



             = 9860112795)                                        

 

             LYMPH % (test code = 23.2 %                                 



             736-9)                                              

 

             MONO % (test code = 9.6 %                                  



             5905-5)                                             

 

             EOS % (test code = 3.8 %                                  



             713-8)                                              

 

             BASO % (test code = 0.4 %                                  



             706-2)                                              

 

             GRAN MAT x10^3(ANC) 4.76 10*3/uL 1.99-6.95                 



             (test code =                                        



             7960818436)                                         

 

             IMM GRAN x10^3 (test 0.03 10*3/uL 0.00-0.06                 



             code = 3731479639)                                        

 

             LYMPH x10^3 (test code 1.76 10*3/uL 1.09-3.23                 



             = 731-0)                                            

 

             MONO x10^3 (test code 0.73 10*3/uL 0.36-1.02                 



             = 742-7)                                            

 

             EOS x10^3 (test code = 0.29 10*3/uL 0.06-0.53                 



             711-2)                                              

 

             BASO x10^3 (test code 0.03 10*3/uL 0.01-0.09                 



             = 704-7)                                            

 

             Lab Interpretation Abnormal                               



             (test code = 68605-4)                                        



[id number] 
04:36:59





             Test Item    Value        Reference Range Interpretation Comments

 

             NA (test code = 138 mmol/L   135-145                   



             2800054857)                                         

 

             K (test code = 5.4 mmol/L   3.5-5.0      H            



             6407135640)                                         

 

             CL (test code = 98 mmol/L                        



             2739637449)                                         

 

             CO2 TOTAL (test code = 17 mmol/L    23-31        L            



             3367702829)                                         

 

             AGAP (test code =              2-16         H            



             6253040877)                                         

 

             BUN (test code = 19 mg/dL     7-23                      



             6115787381)                                         

 

             GLUCOSE (test code = 469 mg/dL           HH           



             3284904352)                                         

 

             CREATININE (test code = 0.74 mg/dL   0.60-1.25                 



             6129939313)                                         

 

             TOTAL BILI (test code = 1.0 mg/dL    0.1-1.1                   



             6821652116)                                         

 

             CALCIUM (test code = 10.1 mg/dL   8.6-10.6                  



             9035147739)                                         

 

             T PROTEIN (test code = 8.2 g/dL     6.3-8.2                   



             3559919712)                                         

 

             ALBUMIN (test code = 4.6 g/dL     3.5-5.0                   



             9768684773)                                         

 

             ALK PHOS (test code = 208 U/L             H            



             7664773249)                                         

 

             ALTv (test code = 51 U/L       5-50         H            



             1742-6)                                             

 

             AST(SGOT) (test code = 18 U/L       13-40                     



             6916117871)                                         

 

             eGFR (test code =              mL/min/1.73m2              



             5343855156)                                         

 

             MAL (test code = MAL) Association of                           



                          Glomerular Filtration                           



                          Rate (GFR) and Staging                           



                          of Kidney Disease*                           



                          +---------------------                           



                          --+-------------------                           



                          --+-------------------                           



                          ------+| GFR                           



                          (mL/min/1.73 m2) ?|                           



                          With Kidney Damage ?|                           



                          ?Without Kidney                           



                          Damage+---------------                           



                          --------+-------------                           



                          --------+-------------                           



                          ------------+| ?>90 ?                           



                          ? ? ? ? ? ? ? ?|                           



                          ?Stage one ? ? ? ? ?|                           



                          ? Normal ? ? ? ? ? ? ?                           



                          ?+--------------------                           



                          ---+------------------                           



                          ---+------------------                           



                          -------+| ?60-89 ? ? ?                           



                          ? ? ? ? ?| ?Stage two                           



                          ? ? ? ? ?| ? Decreased                           



                          GFR ? ? ? ?                            



                          +---------------------                           



                          --+-------------------                           



                          --+-------------------                           



                          ------+| ?30-59 ? ? ?                           



                          ? ? ? ? ?| ?Stage                           



                          three ? ? ? ?| ? Stage                           



                          three ? ? ? ? ?                           



                          +---------------------                           



                          --+-------------------                           



                          --+-------------------                           



                          ------+| ?15-29 ? ? ?                           



                          ? ? ? ? ?| ?Stage four                           



                          ? ? ? ? | ? Stage four                           



                          ? ? ? ? ?                              



                          ?+--------------------                           



                          ---+------------------                           



                          ---+------------------                           



                          -------+| ?<15 (or                           



                          dialysis) ? ?| ?Stage                           



                          five ? ? ? ? | ? Stage                           



                          five ? ? ? ? ?                           



                          ?+--------------------                           



                          ---+------------------                           



                          ---+------------------                           



                          -------+ *Each stage                           



                          assumes the associated                           



                          GFR level has been in                           



                          effect for at least                           



                          three months. ?Stages                           



                          1 to 5, with or                           



                          without kidney                           



                          disease, indicate                           



                          chronic kidney                           



                          disease. Notes:                           



                          Determination of                           



                          stages one and two                           



                          (with eGFR                             



                          >59mL/min/1.73 m2)                           



                          requires estimation of                           



                          kidney damage for at                           



                          least three months as                           



                          defined by structural                           



                          or functional                           



                          abnormalities of the                           



                          kidney, manifested by                           



                          either:Pathological                           



                          abnormalities or                           



                          Markers of kidney                           



                          damage (including                           



                          abnormalities in the                           



                          composition of the                           



                          blood or urine or                           



                          abnormalities in                           



                          imaging tests).                           

 

             Lab Interpretation Abnormal                               



             (test code = 70205-2)                                        



[facility]





             Test Item    Value        Reference Range Interpretation Comments

 

             LIPASE (test code = 4494964648) 137 U/L      0-220                 

    

 

             Lab Interpretation (test code = Normal                             

    



             92450-7)                                            



[facility] WITH [id number] 04:07:59





             Test Item    Value        Reference Range Interpretation Comments

 

             WBC (test code =              See_Comment  H             [Automated



             6690-2)                                             message] The sy

stem



                                                                 which generated



                                                                 this result



                                                                 transmitted



                                                                 reference range

:



                                                                 4.20 - 10.70



                                                                 10*3/?L. The



                                                                 reference range

 was



                                                                 not used to



                                                                 interpret this



                                                                 result as



                                                                 normal/abnormal

.

 

             RBC (test code =              See_Comment  H             [Automated



             789-8)                                              message] The sy

stem



                                                                 which generated



                                                                 this result



                                                                 transmitted



                                                                 reference range

:



                                                                 4.26 - 5.52



                                                                 10*6/?L. The



                                                                 reference range

 was



                                                                 not used to



                                                                 interpret this



                                                                 result as



                                                                 normal/abnormal

.

 

             HGB (test code = 16.8 g/dL    12.2-16.4    H            



             718-7)                                              

 

             HCT (test code = 49.2 %       38.4-49.3                 



             4544-3)                                             

 

             MCV (test code = 86.2 fL      81.7-95.6                 



             787-2)                                              

 

             MCH (test code = 29.4 pg      26.1-32.7                 



             785-6)                                              

 

             MCHC (test code = 34.1 g/dL    31.2-35.0                 



             786-4)                                              

 

             RDW-SD (test code = 42.6 fL      38.5-51.6                 



             77954-2)                                            

 

             RDW-CV (test code = 13.6 %       12.1-15.4                 



             788-0)                                              

 

             PLT (test code =              See_Comment  H             [Automated



             777-3)                                              message] The sy

stem



                                                                 which generated



                                                                 this result



                                                                 transmitted



                                                                 reference range

:



                                                                 150 - 328 10*3/

?L.



                                                                 The reference r

zhen



                                                                 was not used to



                                                                 interpret this



                                                                 result as



                                                                 normal/abnormal

.

 

             MPV (test code = 10.7 fL      9.8-13.0                  



             26443-5)                                            

 

             NRBC/100 WBC (test              See_Comment                [Automat

ed



             code = 7164259402)                                        message] 

The system



                                                                 which generated



                                                                 this result



                                                                 transmitted



                                                                 reference range

:



                                                                 0.0 - 10.0 /100



                                                                 WBCs. The refer

ence



                                                                 range was not u

sed



                                                                 to interpret th

is



                                                                 result as



                                                                 normal/abnormal

.

 

             NRBC x10^3 (test code <0.01        See_Comment                [Auto

mated



             = 8097987577)                                        message] The s

ystem



                                                                 which generated



                                                                 this result



                                                                 transmitted



                                                                 reference range

:



                                                                 10*3/?L. The



                                                                 reference range

 was



                                                                 not used to



                                                                 interpret this



                                                                 result as



                                                                 normal/abnormal

.

 

             GRAN MAT (NEUT) % 74.2 %                                 



             (test code = 770-8)                                        

 

             IMM GRAN % (test code 0.50 %                                 



             = 5493083272)                                        

 

             LYMPH % (test code = 14.8 %                                 



             736-9)                                              

 

             MONO % (test code = 8.3 %                                  



             5905-5)                                             

 

             EOS % (test code = 1.9 %                                  



             713-8)                                              

 

             BASO % (test code = 0.3 %                                  



             706-2)                                              

 

             GRAN MAT x10^3(ANC) 8.01 10*3/uL 1.99-6.95    H            



             (test code =                                        



             5487649512)                                         

 

             IMM GRAN x10^3 (test 0.05 10*3/uL 0.00-0.06                 



             code = 6668708817)                                        

 

             LYMPH x10^3 (test code 1.59 10*3/uL 1.09-3.23                 



             = 731-0)                                            

 

             MONO x10^3 (test code 0.89 10*3/uL 0.36-1.02                 



             = 742-7)                                            

 

             EOS x10^3 (test code = 0.20 10*3/uL 0.06-0.53                 



             711-2)                                              

 

             BASO x10^3 (test code 0.03 10*3/uL 0.01-0.09                 



             = 704-7)                                            

 

             Lab Interpretation Abnormal                               



             (test code = 20878-1)                                        



[facility] GLUCOSE (AUTOMATED)2022-06-10 17:25:00





             Test Item    Value        Reference Range Interpretation Comments

 

             POCT GLU (test code = 0483609007) 249 mg/dL           H      

      

 

             Lab Interpretation (test code = Abnormal                           

    



             41963-8)                                            



[facility] GLUCOSE (AUTOMATED)2022-06-10 12:54:01





             Test Item    Value        Reference Range Interpretation Comments

 

             POCT GLU (test code = 7971305892) 188 mg/dL           H      

      

 

             Lab Interpretation (test code = Abnormal                           

    



             17016-1)                                            



[facility] METABOLIC PANEL (NA, K, CL, CO2, 
GLUCOSE, BUN, CREATININE, CA)2022-06-10 11:48:23





             Test Item    Value        Reference Range Interpretation Comments

 

             NA (test code = 137 mmol/L   135-145                   



             5560383525)                                         

 

             K (test code = 4.3 mmol/L   3.5-5.0                   



             7274271694)                                         

 

             CL (test code = 105 mmol/L                       



             4068770920)                                         

 

             CO2 TOTAL (test code = 23 mmol/L    23-31                     



             4036947718)                                         

 

             AGAP (test code =              2-16                      



             3767401688)                                         

 

             BUN (test code = 19 mg/dL     7-23                      



             7459895618)                                         

 

             GLUCOSE (test code = 243 mg/dL           H            



             6599101635)                                         

 

             CREATININE (test code = 0.50 mg/dL   0.60-1.25    L            



             6544951283)                                         

 

             CALCIUM (test code = 8.9 mg/dL    8.6-10.6                  



             2260353814)                                         

 

             eGFR (test code =              mL/min/1.73m2              



             8966231174)                                         

 

             MAL (test code = MAL) Association of                           



                          Glomerular Filtration                           



                          Rate (GFR) and Staging                           



                          of Kidney Disease*                           



                          +---------------------                           



                          --+-------------------                           



                          --+-------------------                           



                          ------+| GFR                           



                          (mL/min/1.73 m2) ?|                           



                          With Kidney Damage ?|                           



                          ?Without Kidney                           



                          Damage+---------------                           



                          --------+-------------                           



                          --------+-------------                           



                          ------------+| ?>90 ?                           



                          ? ? ? ? ? ? ? ?|                           



                          ?Stage one ? ? ? ? ?|                           



                          ? Normal ? ? ? ? ? ? ?                           



                          ?+--------------------                           



                          ---+------------------                           



                          ---+------------------                           



                          -------+| ?60-89 ? ? ?                           



                          ? ? ? ? ?| ?Stage two                           



                          ? ? ? ? ?| ? Decreased                           



                          GFR ? ? ? ?                            



                          +---------------------                           



                          --+-------------------                           



                          --+-------------------                           



                          ------+| ?30-59 ? ? ?                           



                          ? ? ? ? ?| ?Stage                           



                          three ? ? ? ?| ? Stage                           



                          three ? ? ? ? ?                           



                          +---------------------                           



                          --+-------------------                           



                          --+-------------------                           



                          ------+| ?15-29 ? ? ?                           



                          ? ? ? ? ?| ?Stage four                           



                          ? ? ? ? | ? Stage four                           



                          ? ? ? ? ?                              



                          ?+--------------------                           



                          ---+------------------                           



                          ---+------------------                           



                          -------+| ?<15 (or                           



                          dialysis) ? ?| ?Stage                           



                          five ? ? ? ? | ? Stage                           



                          five ? ? ? ? ?                           



                          ?+--------------------                           



                          ---+------------------                           



                          ---+------------------                           



                          -------+ *Each stage                           



                          assumes the associated                           



                          GFR level has been in                           



                          effect for at least                           



                          three months. ?Stages                           



                          1 to 5, with or                           



                          without kidney                           



                          disease, indicate                           



                          chronic kidney                           



                          disease. Notes:                           



                          Determination of                           



                          stages one and two                           



                          (with eGFR                             



                          >59mL/min/1.73 m2)                           



                          requires estimation of                           



                          kidney damage for at                           



                          least three months as                           



                          defined by structural                           



                          or functional                           



                          abnormalities of the                           



                          kidney, manifested by                           



                          either:Pathological                           



                          abnormalities or                           



                          Markers of kidney                           



                          damage (including                           



                          abnormalities in the                           



                          composition of the                           



                          blood or urine or                           



                          abnormalities in                           



                          imaging tests).                           

 

             Lab Interpretation Abnormal                               



             (test code = 08984-5)                                        



[facility]





             Test Item    Value        Reference Range Interpretation Comments

 

             MAGNESIUM (test code = 6871668004) 2.1 mg/dL    1.7-2.4            

       

 

             Lab Interpretation (test code = Normal                             

    



             64916-8)                                            



[facility] WITH DIFF2022-06-10 11:09:44





             Test Item    Value        Reference Range Interpretation Comments

 

             WBC (test code =              See_Comment                [Automated

 message]



             6690-2)                                             The system [employer]



                                                                 generated this 

result



                                                                 transmitted ref

erence



                                                                 range: 4.20 - 1

0.70



                                                                 10*3/?L. The re

ference



                                                                 range was not u

sed to



                                                                 interpret this 

result



                                                                 as normal/abnor

mal.

 

             RBC (test code =              See_Comment                [Automated

 message]



             789-8)                                              The system [employer]



                                                                 generated this 

result



                                                                 transmitted ref

erence



                                                                 range: 4.26 - 5

.52



                                                                 10*6/?L. The re

ference



                                                                 range was not u

sed to



                                                                 interpret this 

result



                                                                 as normal/abnor

mal.

 

             HGB (test code = 15.9 g/dL    12.2-16.4                 



             718-7)                                              

 

             HCT (test code = 46.8 %       38.4-49.3                 



             4544-3)                                             

 

             MCV (test code = 87.5 fL      81.7-95.6                 



             787-2)                                              

 

             MCH (test code = 29.7 pg      26.1-32.7                 



             785-6)                                              

 

             MCHC (test code = 34.0 g/dL    31.2-35.0                 



             786-4)                                              

 

             RDW-SD (test code 43.6 fL      38.5-51.6                 



             = 92581-6)                                          

 

             RDW-CV (test code 13.7 %       12.1-15.4                 



             = 788-0)                                            

 

             PLT (test code =              See_Comment                [Automated

 message]



             777-3)                                              The system [employer]



                                                                 generated this 

result



                                                                 transmitted ref

erence



                                                                 range: 150 - 32

8



                                                                 10*3/?L. The re

ference



                                                                 range was not u

sed to



                                                                 interpret this 

result



                                                                 as normal/abnor

mal.

 

             MPV (test code = 11.0 fL      9.8-13.0                  



             92119-5)                                            

 

             NRBC/100 WBC (test              See_Comment                [Automat

ed message]



             code = 0018547749)                                        The syste

[employer] which



                                                                 generated this 

result



                                                                 transmitted ref

erence



                                                                 range: 0.0 - 10

.0 /100



                                                                 WBCs. The refer

ence



                                                                 range was not u

sed to



                                                                 interpret this 

result



                                                                 as normal/abnor

mal.

 

             NRBC x10^3 (test <0.01        See_Comment                [Automated

 message]



             code = 3221766004)                                        The syste

m which



                                                                 generated this 

result



                                                                 transmitted ref

erence



                                                                 range: 10*3/?L.

 The



                                                                 reference range

 was not



                                                                 used to interpr

et this



                                                                 result as



                                                                 normal/abnormal

.

 

             GRAN MAT (NEUT) % 57.9 %                                 



             (test code =                                        



             770-8)                                              

 

             IMM GRAN % (test 0.60 %                                 



             code = 4815303388)                                        

 

             LYMPH % (test code 30.7 %                                 



             = 736-9)                                            

 

             MONO % (test code 8.5 %                                  



             = 5905-5)                                           

 

             EOS % (test code = 2.0 %                                  



             713-8)                                              

 

             BASO % (test code 0.3 %                                  



             = 706-2)                                            

 

             GRAN MAT     5.14 10*3/uL 1.99-6.95                 



             x10^3(ANC) (test                                        



             code = 9183881567)                                        

 

             IMM GRAN x10^3 0.05 10*3/uL 0.00-0.06                 



             (test code =                                        



             9885647006)                                         

 

             LYMPH x10^3 (test 2.73 10*3/uL 1.09-3.23                 



             code = 731-0)                                        

 

             MONO x10^3 (test 0.76 10*3/uL 0.36-1.02                 



             code = 742-7)                                        

 

             EOS x10^3 (test 0.18 10*3/uL 0.06-0.53                 



             code = 711-2)                                        

 

             BASO x10^3 (test 0.03 10*3/uL 0.01-0.09                 



             code = 704-7)                                        



[facility] GLUCOSE (AUTOMATED)2022-06-10 10:34:14





             Test Item    Value        Reference Range Interpretation Comments

 

             POCT GLU (test code = 6214402638) 230 mg/dL           H      

      

 

             Lab Interpretation (test code = Abnormal                           

    



             33360-7)                                            



[facility] GLUCOSE (AUTOMATED)2022-06-10 05:56:50





             Test Item    Value        Reference Range Interpretation Comments

 

             POCT GLU (test code = 8851641562) 314 mg/dL           H      

      

 

             Lab Interpretation (test code = Abnormal                           

    



             26642-4)                                            



[facility] GLUCOSE (AUTOMATED)2022-06-10 04:40:22





             Test Item    Value        Reference Range Interpretation Comments

 

             POCT GLU (test code = 7114436880) 324 mg/dL           H      

      

 

             Lab Interpretation (test code = Abnormal                           

    



             14488-5)                                            



[facility] GLUCOSE (AUTOMATED)2022-06-10 02:37:33





             Test Item    Value        Reference Range Interpretation Comments

 

             POCT GLU (test code = 8749863592) 296 mg/dL           H      

      

 

             Lab Interpretation (test code = Abnormal                           

    



             88459-8)                                            



[facility] GLUCOSE (AUTOMATED)2022-06-10 01:05:02





             Test Item    Value        Reference Range Interpretation Comments

 

             POCT GLU (test code = 4037935946) 319 mg/dL           H      

      

 

             Lab Interpretation (test code = Abnormal                           

    



             44215-3)                                            



[facility] GLUCOSE (AUTOMATED)2022 21:59:09





             Test Item    Value        Reference Range Interpretation Comments

 

             POCT GLU (test code = 3521763046) 257 mg/dL           H      

      

 

             Lab Interpretation (test code = Abnormal                           

    



             20904-8)                                            



[facility] GLUCOSE (AUTOMATED)2022 17:21:41





             Test Item    Value        Reference Range Interpretation Comments

 

             POCT GLU (test code = 0457676235) 214 mg/dL           H      

      

 

             Lab Interpretation (test code = Abnormal                           

    



             15179-7)                                            



[facility] GLUCOSE (AUTOMATED)2022 13:04:04





             Test Item    Value        Reference Range Interpretation Comments

 

             POCT GLU (test code = 1497599575) 234 mg/dL           H      

      

 

             Lab Interpretation (test code = Abnormal                           

    



             37912-3)                                            



[facility] METABOLIC PANEL (NA, K, CL, CO2, 
GLUCOSE, BUN, CREATININE, CA)2022 12:23:33





             Test Item    Value        Reference Range Interpretation Comments

 

             NA (test code = 136 mmol/L   135-145                   



             9970935650)                                         

 

             K (test code = 4.1 mmol/L   3.5-5.0                   



             3756395769)                                         

 

             CL (test code = 109 mmol/L          H            



             2538127869)                                         

 

             CO2 TOTAL (test code = 20 mmol/L    23-31        L            



             5695197468)                                         

 

             AGAP (test code =              2-16                      



             2272279582)                                         

 

             BUN (test code = 16 mg/dL     7-23                      



             1882941619)                                         

 

             GLUCOSE (test code = 281 mg/dL           H            



             9003842613)                                         

 

             CREATININE (test code = 0.50 mg/dL   0.60-1.25    L            



             0202221967)                                         

 

             CALCIUM (test code = 8.3 mg/dL    8.6-10.6     L            



             8387564992)                                         

 

             eGFR (test code =              mL/min/1.73m2              



             9652215582)                                         

 

             MAL (test code = MAL) Association of                           



                          Glomerular Filtration                           



                          Rate (GFR) and Staging                           



                          of Kidney Disease*                           



                          +---------------------                           



                          --+-------------------                           



                          --+-------------------                           



                          ------+| GFR                           



                          (mL/min/1.73 m2) ?|                           



                          With Kidney Damage ?|                           



                          ?Without Kidney                           



                          Damage+---------------                           



                          --------+-------------                           



                          --------+-------------                           



                          ------------+| ?>90 ?                           



                          ? ? ? ? ? ? ? ?|                           



                          ?Stage one ? ? ? ? ?|                           



                          ? Normal ? ? ? ? ? ? ?                           



                          ?+--------------------                           



                          ---+------------------                           



                          ---+------------------                           



                          -------+| ?60-89 ? ? ?                           



                          ? ? ? ? ?| ?Stage two                           



                          ? ? ? ? ?| ? Decreased                           



                          GFR ? ? ? ?                            



                          +---------------------                           



                          --+-------------------                           



                          --+-------------------                           



                          ------+| ?30-59 ? ? ?                           



                          ? ? ? ? ?| ?Stage                           



                          three ? ? ? ?| ? Stage                           



                          three ? ? ? ? ?                           



                          +---------------------                           



                          --+-------------------                           



                          --+-------------------                           



                          ------+| ?15-29 ? ? ?                           



                          ? ? ? ? ?| ?Stage four                           



                          ? ? ? ? | ? Stage four                           



                          ? ? ? ? ?                              



                          ?+--------------------                           



                          ---+------------------                           



                          ---+------------------                           



                          -------+| ?<15 (or                           



                          dialysis) ? ?| ?Stage                           



                          five ? ? ? ? | ? Stage                           



                          five ? ? ? ? ?                           



                          ?+--------------------                           



                          ---+------------------                           



                          ---+------------------                           



                          -------+ *Each stage                           



                          assumes the associated                           



                          GFR level has been in                           



                          effect for at least                           



                          three months. ?Stages                           



                          1 to 5, with or                           



                          without kidney                           



                          disease, indicate                           



                          chronic kidney                           



                          disease. Notes:                           



                          Determination of                           



                          stages one and two                           



                          (with eGFR                             



                          >59mL/min/1.73 m2)                           



                          requires estimation of                           



                          kidney damage for at                           



                          least three months as                           



                          defined by structural                           



                          or functional                           



                          abnormalities of the                           



                          kidney, manifested by                           



                          either:Pathological                           



                          abnormalities or                           



                          Markers of kidney                           



                          damage (including                           



                          abnormalities in the                           



                          composition of the                           



                          blood or urine or                           



                          abnormalities in                           



                          imaging tests).                           

 

             Lab Interpretation Abnormal                               



             (test code = 25748-3)                                        



[facility]





             Test Item    Value        Reference Range Interpretation Comments

 

             MAGNESIUM (test code = 0888279530) 1.6 mg/dL    1.7-2.4      L     

       

 

             Lab Interpretation (test code = Abnormal                           

    



             14159-4)                                            



[facility]





             Test Item    Value        Reference Range Interpretation Comments

 

             PHOSPHORUS (test code = 8600522345) 3.2 mg/dL    2.5-5.0           

        

 

             Lab Interpretation (test code = Normal                             

    



             75692-7)                                            



[facility] 
10:04:37





             Test Item    Value        Reference Range Interpretation Comments

 

             HGB A1C (test code = 9.6 %        4.0-5.7      H            



             4548-4)                                             

 

             MAL (test code = MAL) Reference                              



                          RangesNormal:                           



                          <5.7%Prediabetes:                           



                          5.7 - 6.4%Diabetes:                           



                          > 6.5%                                 

 

             Lab Interpretation (test Abnormal                               



             code = 97337-7)                                        



[facility] WITH [id number] 09:29:41





             Test Item    Value        Reference Range Interpretation Comments

 

             WBC (test code =              See_Comment                [Automated

 message]



             6690-2)                                             The system [employer]



                                                                 generated this 

result



                                                                 transmitted ref

erence



                                                                 range: 4.20 - 1

0.70



                                                                 10*3/?L. The re

ference



                                                                 range was not u

sed to



                                                                 interpret this 

result



                                                                 as normal/abnor

mal.

 

             RBC (test code =              See_Comment                [Automated

 message]



             919-8)                                              The system [employer]



                                                                 generated this 

result



                                                                 transmitted ref

erence



                                                                 range: 4.26 - 5

.52



                                                                 10*6/?L. The re

ference



                                                                 range was not u

sed to



                                                                 interpret this 

result



                                                                 as normal/abnor

mal.

 

             HGB (test code = 14.7 g/dL    12.2-16.4                 



             718-7)                                              

 

             HCT (test code = 43.3 %       38.4-49.3                 



             4544-3)                                             

 

             MCV (test code = 86.9 fL      81.7-95.6                 



             787-2)                                              

 

             MCH (test code = 29.5 pg      26.1-32.7                 



             785-6)                                              

 

             MCHC (test code = 33.9 g/dL    31.2-35.0                 



             786-4)                                              

 

             RDW-SD (test code 42.5 fL      38.5-51.6                 



             = 66493-5)                                          

 

             RDW-CV (test code 13.5 %       12.1-15.4                 



             = 788-0)                                            

 

             PLT (test code =              See_Comment                [Automated

 message]



             777-3)                                              The system [employer]



                                                                 generated this 

result



                                                                 transmitted ref

erence



                                                                 range: 150 - 32

8



                                                                 10*3/?L. The re

ference



                                                                 range was not u

sed to



                                                                 interpret this 

result



                                                                 as normal/abnor

mal.

 

             MPV (test code = 10.8 fL      9.8-13.0                  



             59074-5)                                            

 

             NRBC/100 WBC (test              See_Comment                [Automat

ed message]



             code = 0704291006)                                        The [employer] which



                                                                 generated this 

result



                                                                 transmitted ref

erence



                                                                 range: 0.0 - 10

.0 /100



                                                                 WBCs. The refer

ence



                                                                 range was not u

sed to



                                                                 interpret this 

result



                                                                 as normal/abnor

mal.

 

             NRBC x10^3 (test <0.01        See_Comment                [Automated

 message]



             code = 5700241267)                                        The syste

m which



                                                                 generated this 

result



                                                                 transmitted ref

erence



                                                                 range: 10*3/?L.

 The



                                                                 reference range

 was not



                                                                 used to interpr

et this



                                                                 result as



                                                                 normal/abnormal

.

 

             GRAN MAT (NEUT) % 58.6 %                                 



             (test code =                                        



             770-8)                                              

 

             IMM GRAN % (test 0.50 %                                 



             code = 6388710614)                                        

 

             LYMPH % (test code 31.3 %                                 



             = 736-9)                                            

 

             MONO % (test code 6.9 %                                  



             = 5905-5)                                           

 

             EOS % (test code = 2.4 %                                  



             713-8)                                              

 

             BASO % (test code 0.3 %                                  



             = 706-2)                                            

 

             GRAN MAT     5.39 10*3/uL 1.99-6.95                 



             x10^3(ANC) (test                                        



             code = 2841197807)                                        

 

             IMM GRAN x10^3 0.05 10*3/uL 0.00-0.06                 



             (test code =                                        



             1512491751)                                         

 

             LYMPH x10^3 (test 2.89 10*3/uL 1.09-3.23                 



             code = 731-0)                                        

 

             MONO x10^3 (test 0.64 10*3/uL 0.36-1.02                 



             code = 742-7)                                        

 

             EOS x10^3 (test 0.22 10*3/uL 0.06-0.53                 



             code = 711-2)                                        

 

             BASO x10^3 (test 0.03 10*3/uL 0.01-0.09                 



             code = 704-7)                                        



[facility] GLUCOSE (AUTOMATED)2022 09:06:33





             Test Item    Value        Reference Range Interpretation Comments

 

             POCT GLU (test code = 9978546457) 206 mg/dL           H      

      

 

             Lab Interpretation (test code = Abnormal                           

    



             82578-2)                                            



[facility] GLUCOSE (AUTOMATED)2022 04:38:41





             Test Item    Value        Reference Range Interpretation Comments

 

             POCT GLU (test code = 0955121331) 272 mg/dL           H      

      

 

             Lab Interpretation (test code = Abnormal                           

    



             92879-8)                                            



[facility] GLUCOSE (AUTOMATED)2022 01:14:47





             Test Item    Value        Reference Range Interpretation Comments

 

             POCT GLU (test code = 6108005831) 256 mg/dL           H      

      

 

             Lab Interpretation (test code = Abnormal                           

    



             49571-5)                                            



[facility] GLUCOSE (AUTOMATED)2022 21:11:19





             Test Item    Value        Reference Range Interpretation Comments

 

             POCT GLU (test code = 3771937547) 254 mg/dL           H      

      

 

             Lab Interpretation (test code = Abnormal                           

    



             92289-7)                                            



[facility] GLUCOSE (AUTOMATED)2022 17:10:33





             Test Item    Value        Reference Range Interpretation Comments

 

             POCT GLU (test code = 3582645236) 350 mg/dL           H      

      

 

             Lab Interpretation (test code = Abnormal                           

    



             28196-4)                                            



[facility] GLUCOSE (AUTOMATED)2022 12:50:18





             Test Item    Value        Reference Range Interpretation Comments

 

             POCT GLU (test code = 3691315389) 269 mg/dL           H      

      

 

             Lab Interpretation (test code = Abnormal                           

    



             18668-4)                                            



[facility] Metabolic Panel (NA, K, CL, CO2, 
GLUCOSE, BUN, CREATININE, CA)2022 10:18:27





             Test Item    Value        Reference Range Interpretation Comments

 

             NA (test code = 137 mmol/L   135-145                   



             0105094652)                                         

 

             K (test code = 4.0 mmol/L   3.5-5.0                   



             2261608190)                                         

 

             CL (test code = 108 mmol/L                       



             9644663440)                                         

 

             CO2 TOTAL (test code = 23 mmol/L    23-31                     



             4076602172)                                         

 

             AGAP (test code =              2-16                      



             0141843478)                                         

 

             BUN (test code = 16 mg/dL     7-23                      



             3208452077)                                         

 

             GLUCOSE (test code = 386 mg/dL           H            



             0870753049)                                         

 

             CREATININE (test code = 0.70 mg/dL   0.60-1.25                 



             3264748641)                                         

 

             CALCIUM (test code = 8.6 mg/dL    8.6-10.6                  



             0754006422)                                         

 

             eGFR (test code =              mL/min/1.73m2              



             4873125209)                                         

 

             MAL (test code = MAL) Association of                           



                          Glomerular Filtration                           



                          Rate (GFR) and Staging                           



                          of Kidney Disease*                           



                          +---------------------                           



                          --+-------------------                           



                          --+-------------------                           



                          ------+| GFR                           



                          (mL/min/1.73 m2) ?|                           



                          With Kidney Damage ?|                           



                          ?Without Kidney                           



                          Damage+---------------                           



                          --------+-------------                           



                          --------+-------------                           



                          ------------+| ?>90 ?                           



                          ? ? ? ? ? ? ? ?|                           



                          ?Stage one ? ? ? ? ?|                           



                          ? Normal ? ? ? ? ? ? ?                           



                          ?+--------------------                           



                          ---+------------------                           



                          ---+------------------                           



                          -------+| ?60-89 ? ? ?                           



                          ? ? ? ? ?| ?Stage two                           



                          ? ? ? ? ?| ? Decreased                           



                          GFR ? ? ? ?                            



                          +---------------------                           



                          --+-------------------                           



                          --+-------------------                           



                          ------+| ?30-59 ? ? ?                           



                          ? ? ? ? ?| ?Stage                           



                          three ? ? ? ?| ? Stage                           



                          three ? ? ? ? ?                           



                          +---------------------                           



                          --+-------------------                           



                          --+-------------------                           



                          ------+| ?15-29 ? ? ?                           



                          ? ? ? ? ?| ?Stage four                           



                          ? ? ? ? | ? Stage four                           



                          ? ? ? ? ?                              



                          ?+--------------------                           



                          ---+------------------                           



                          ---+------------------                           



                          -------+| ?<15 (or                           



                          dialysis) ? ?| ?Stage                           



                          five ? ? ? ? | ? Stage                           



                          five ? ? ? ? ?                           



                          ?+--------------------                           



                          ---+------------------                           



                          ---+------------------                           



                          -------+ *Each stage                           



                          assumes the associated                           



                          GFR level has been in                           



                          effect for at least                           



                          three months. ?Stages                           



                          1 to 5, with or                           



                          without kidney                           



                          disease, indicate                           



                          chronic kidney                           



                          disease. Notes:                           



                          Determination of                           



                          stages one and two                           



                          (with eGFR                             



                          >59mL/min/1.73 m2)                           



                          requires estimation of                           



                          kidney damage for at                           



                          least three months as                           



                          defined by structural                           



                          or functional                           



                          abnormalities of the                           



                          kidney, manifested by                           



                          either:Pathological                           



                          abnormalities or                           



                          Markers of kidney                           



                          damage (including                           



                          abnormalities in the                           



                          composition of the                           



                          blood or urine or                           



                          abnormalities in                           



                          imaging tests).                           

 

             Lab Interpretation Abnormal                               



             (test code = 73716-3)                                        



[facility] with [id number] 10:03:31





             Test Item    Value        Reference Range Interpretation Comments

 

             WBC (test code =              See_Comment                [Automated

 message]



             6690-2)                                             The system [employer]



                                                                 generated this 

result



                                                                 transmitted ref

erence



                                                                 range: 4.20 - 1

0.70



                                                                 10*3/?L. The re

ference



                                                                 range was not u

sed to



                                                                 interpret this 

result



                                                                 as normal/abnor

mal.

 

             RBC (test code =              See_Comment                [Automated

 message]



             789-8)                                              The system [employer]



                                                                 generated this 

result



                                                                 transmitted ref

erence



                                                                 range: 4.26 - 5

.52



                                                                 10*6/?L. The re

ference



                                                                 range was not u

sed to



                                                                 interpret this 

result



                                                                 as normal/abnor

mal.

 

             HGB (test code = 15.5 g/dL    12.2-16.4                 



             718-7)                                              

 

             HCT (test code = 45.0 %       38.4-49.3                 



             4544-3)                                             

 

             MCV (test code = 85.9 fL      81.7-95.6                 



             787-2)                                              

 

             MCH (test code = 29.6 pg      26.1-32.7                 



             785-6)                                              

 

             MCHC (test code = 34.4 g/dL    31.2-35.0                 



             786-4)                                              

 

             RDW-SD (test code 42.5 fL      38.5-51.6                 



             = 68640-2)                                          

 

             RDW-CV (test code 13.5 %       12.1-15.4                 



             = 788-0)                                            

 

             PLT (test code =              See_Comment                [Automated

 message]



             777-3)                                              The system whic

h



                                                                 generated this 

result



                                                                 transmitted ref

erence



                                                                 range: 150 - 32

8



                                                                 10*3/?L. The re

ference



                                                                 range was not u

sed to



                                                                 interpret this 

result



                                                                 as normal/abnor

mal.

 

             MPV (test code = 11.2 fL      9.8-13.0                  



             83485-8)                                            

 

             NRBC/100 WBC (test              See_Comment                [Automat

ed message]



             code = 8528963085)                                        The syste

m which



                                                                 generated this 

result



                                                                 transmitted ref

erence



                                                                 range: 0.0 - 10

.0 /100



                                                                 WBCs. The refer

ence



                                                                 range was not u

sed to



                                                                 interpret this 

result



                                                                 as normal/abnor

mal.

 

             NRBC x10^3 (test <0.01        See_Comment                [Automated

 message]



             code = 5350731169)                                        The syste

m which



                                                                 generated this 

result



                                                                 transmitted ref

erence



                                                                 range: 10*3/?L.

 The



                                                                 reference range

 was not



                                                                 used to interpr

et this



                                                                 result as



                                                                 normal/abnormal

.

 

             GRAN MAT (NEUT) % 63.5 %                                 



             (test code =                                        



             770-8)                                              

 

             IMM GRAN % (test 0.30 %                                 



             code = 9877137475)                                        

 

             LYMPH % (test code 24.8 %                                 



             = 736-9)                                            

 

             MONO % (test code 8.7 %                                  



             = 5905-5)                                           

 

             EOS % (test code = 2.5 %                                  



             713-8)                                              

 

             BASO % (test code 0.2 %                                  



             = 706-2)                                            

 

             GRAN MAT     6.05 10*3/uL 1.99-6.95                 



             x10^3(ANC) (test                                        



             code = 9519337163)                                        

 

             IMM GRAN x10^3 0.03 10*3/uL 0.00-0.06                 



             (test code =                                        



             7389677108)                                         

 

             LYMPH x10^3 (test 2.37 10*3/uL 1.09-3.23                 



             code = 731-0)                                        

 

             MONO x10^3 (test 0.83 10*3/uL 0.36-1.02                 



             code = 742-7)                                        

 

             EOS x10^3 (test 0.24 10*3/uL 0.06-0.53                 



             code = 711-2)                                        

 

             BASO x10^3 (test <0.03        0.01-0.09                 



             code = 704-7)                                        



[facility] GLUCOSE (AUTOMATED)2022 09:00:15





             Test Item    Value        Reference Range Interpretation Comments

 

             POCT GLU (test code = 5649481809) 402 mg/dL           H      

      

 

             Lab Interpretation (test code = Abnormal                           

    



             91405-8)                                            



[facility] GLUCOSE (AUTOMATED)2022 04:54:36





             Test Item    Value        Reference Range Interpretation Comments

 

             POCT GLU (test code = 2525707083) 368 mg/dL           H      

      

 

             Lab Interpretation (test code = Abnormal                           

    



             91576-1)                                            



[facility] GLUCOSE (AUTOMATED)2022 03:13:40





             Test Item    Value        Reference Range Interpretation Comments

 

             POCT GLU (test code = 0630395265) 438 mg/dL           H      

      

 

             Lab Interpretation (test code = Abnormal                           

    



             96306-9)                                            



[facility]. METABOLIC PANEL ([id number])2022 
01:05:39





             Test Item    Value        Reference Range Interpretation Comments

 

             NA (test code = 133 mmol/L   135-145      L            



             9607277382)                                         

 

             K (test code = 4.7 mmol/L   3.5-5.0                   



             1155114466)                                         

 

             CL (test code = 99 mmol/L                        



             4656289011)                                         

 

             CO2 TOTAL (test code = 21 mmol/L    23-31        L            



             3324261470)                                         

 

             AGAP (test code =              2-16                      



             3893782275)                                         

 

             BUN (test code = 18 mg/dL     7-23                      



             4156360606)                                         

 

             GLUCOSE (test code = 660 mg/dL           HH           



             4319085621)                                         

 

             CREATININE (test code = 0.64 mg/dL   0.60-1.25                 



             7683703882)                                         

 

             TOTAL BILI (test code = 1.0 mg/dL    0.1-1.1                   



             6936211893)                                         

 

             CALCIUM (test code = 9.7 mg/dL    8.6-10.6                  



             3657189984)                                         

 

             T PROTEIN (test code = 7.9 g/dL     6.3-8.2                   



             6628288705)                                         

 

             ALBUMIN (test code = 4.4 g/dL     3.5-5.0                   



             2873510099)                                         

 

             ALK PHOS (test code = 143 U/L             H            



             1364836439)                                         

 

             ALTv (test code = 47 U/L       5-50                      



             1742-6)                                             

 

             AST(SGOT) (test code = 30 U/L       13-40                     



             6879783735)                                         

 

             eGFR (test code =              mL/min/1.73m2              



             1680595277)                                         

 

             MAL (test code = MAL) Association of                           



                          Glomerular Filtration                           



                          Rate (GFR) and Staging                           



                          of Kidney Disease*                           



                          +---------------------                           



                          --+-------------------                           



                          --+-------------------                           



                          ------+| GFR                           



                          (mL/min/1.73 m2) ?|                           



                          With Kidney Damage ?|                           



                          ?Without Kidney                           



                          Damage+---------------                           



                          --------+-------------                           



                          --------+-------------                           



                          ------------+| ?>90 ?                           



                          ? ? ? ? ? ? ? ?|                           



                          ?Stage one ? ? ? ? ?|                           



                          ? Normal ? ? ? ? ? ? ?                           



                          ?+--------------------                           



                          ---+------------------                           



                          ---+------------------                           



                          -------+| ?60-89 ? ? ?                           



                          ? ? ? ? ?| ?Stage two                           



                          ? ? ? ? ?| ? Decreased                           



                          GFR ? ? ? ?                            



                          +---------------------                           



                          --+-------------------                           



                          --+-------------------                           



                          ------+| ?30-59 ? ? ?                           



                          ? ? ? ? ?| ?Stage                           



                          three ? ? ? ?| ? Stage                           



                          three ? ? ? ? ?                           



                          +---------------------                           



                          --+-------------------                           



                          --+-------------------                           



                          ------+| ?15-29 ? ? ?                           



                          ? ? ? ? ?| ?Stage four                           



                          ? ? ? ? | ? Stage four                           



                          ? ? ? ? ?                              



                          ?+--------------------                           



                          ---+------------------                           



                          ---+------------------                           



                          -------+| ?<15 (or                           



                          dialysis) ? ?| ?Stage                           



                          five ? ? ? ? | ? Stage                           



                          five ? ? ? ? ?                           



                          ?+--------------------                           



                          ---+------------------                           



                          ---+------------------                           



                          -------+ *Each stage                           



                          assumes the associated                           



                          GFR level has been in                           



                          effect for at least                           



                          three months. ?Stages                           



                          1 to 5, with or                           



                          without kidney                           



                          disease, indicate                           



                          chronic kidney                           



                          disease. Notes:                           



                          Determination of                           



                          stages one and two                           



                          (with eGFR                             



                          >59mL/min/1.73 m2)                           



                          requires estimation of                           



                          kidney damage for at                           



                          least three months as                           



                          defined by structural                           



                          or functional                           



                          abnormalities of the                           



                          kidney, manifested by                           



                          either:Pathological                           



                          abnormalities or                           



                          Markers of kidney                           



                          damage (including                           



                          abnormalities in the                           



                          composition of the                           



                          blood or urine or                           



                          abnormalities in                           



                          imaging tests).                           

 

             Lab Interpretation Abnormal                               



             (test code = 07314-7)                                        



[facility]





             Test Item    Value        Reference Range Interpretation Comments

 

             LIPASE (test code = 3322003220) 255 U/L      0-220        H        

    

 

             Lab Interpretation (test code = Abnormal                           

    



             58318-0)                                            



[facility] WITH [id number] 00:38:14





             Test Item    Value        Reference Range Interpretation Comments

 

             WBC (test code =              See_Comment  H             [Automated



             6690-2)                                             message] The sy

stem



                                                                 which generated



                                                                 this result



                                                                 transmitted



                                                                 reference range

:



                                                                 4.20 - 10.70



                                                                 10*3/?L. The



                                                                 reference range

 was



                                                                 not used to



                                                                 interpret this



                                                                 result as



                                                                 normal/abnormal

.

 

             RBC (test code =              See_Comment  H             [Automated



             789-8)                                              message] The sy

stem



                                                                 which generated



                                                                 this result



                                                                 transmitted



                                                                 reference range

:



                                                                 4.26 - 5.52



                                                                 10*6/?L. The



                                                                 reference range

 was



                                                                 not used to



                                                                 interpret this



                                                                 result as



                                                                 normal/abnormal

.

 

             HGB (test code = 16.5 g/dL    12.2-16.4    H            



             718-7)                                              

 

             HCT (test code = 48.9 %       38.4-49.3                 



             4544-3)                                             

 

             MCV (test code = 87.2 fL      81.7-95.6                 



             787-2)                                              

 

             MCH (test code = 29.4 pg      26.1-32.7                 



             785-6)                                              

 

             MCHC (test code = 33.7 g/dL    31.2-35.0                 



             786-4)                                              

 

             RDW-SD (test code = 43.8 fL      38.5-51.6                 



             56693-4)                                            

 

             RDW-CV (test code = 13.6 %       12.1-15.4                 



             788-0)                                              

 

             PLT (test code =              See_Comment                [Automated



             777-3)                                              message] The sy

stem



                                                                 which generated



                                                                 this result



                                                                 transmitted



                                                                 reference range

:



                                                                 150 - 328 10*3/

?L.



                                                                 The reference r

zhen



                                                                 was not used to



                                                                 interpret this



                                                                 result as



                                                                 normal/abnormal

.

 

             MPV (test code = 11.4 fL      9.8-13.0                  



             01903-4)                                            

 

             NRBC/100 WBC (test              See_Comment                [Automat

ed



             code = 8557196835)                                        message] 

The system



                                                                 which generated



                                                                 this result



                                                                 transmitted



                                                                 reference range

:



                                                                 0.0 - 10.0 /100



                                                                 WBCs. The refer

ence



                                                                 range was not u

sed



                                                                 to interpret th

is



                                                                 result as



                                                                 normal/abnormal

.

 

             NRBC x10^3 (test code <0.01        See_Comment                [Auto

mated



             = 8738730756)                                        message] The s

ystem



                                                                 which generated



                                                                 this result



                                                                 transmitted



                                                                 reference range

:



                                                                 10*3/?L. The



                                                                 reference range

 was



                                                                 not used to



                                                                 interpret this



                                                                 result as



                                                                 normal/abnormal

.

 

             GRAN MAT (NEUT) % 76.0 %                                 



             (test code = 770-8)                                        

 

             IMM GRAN % (test code 0.50 %                                 



             = 4083563994)                                        

 

             LYMPH % (test code = 15.2 %                                 



             736-9)                                              

 

             MONO % (test code = 6.5 %                                  



             5905-5)                                             

 

             EOS % (test code = 1.6 %                                  



             713-8)                                              

 

             BASO % (test code = 0.2 %                                  



             706-2)                                              

 

             GRAN MAT x10^3(ANC) 8.55 10*3/uL 1.99-6.95    H            



             (test code =                                        



             4869135055)                                         

 

             IMM GRAN x10^3 (test 0.06 10*3/uL 0.00-0.06                 



             code = 5885895480)                                        

 

             LYMPH x10^3 (test code 1.71 10*3/uL 1.09-3.23                 



             = 731-0)                                            

 

             MONO x10^3 (test code 0.73 10*3/uL 0.36-1.02                 



             = 742-7)                                            

 

             EOS x10^3 (test code = 0.18 10*3/uL 0.06-0.53                 



             711-2)                                              

 

             BASO x10^3 (test code <0.03        0.01-0.09                 



             = 704-7)                                            

 

             Lab Interpretation Abnormal                               



             (test code = 27676-4)                                        



[facility]





             Test Item    Value        Reference Range Interpretation Comments

 

             MAGNESIUM (test code = 7302006212) 1.7 mg/dL    1.7-2.4            

       

 

             Lab Interpretation (test code = Normal                             

    



             25491-2)                                            



[facility]NA, K, CL, CO2, 
GLUCOSE, BUN, CREATININE, CA)2022 18:32:38





             Test Item    Value        Reference Range Interpretation Comments

 

             NA (test code = 137 mmol/L   135-145                   



             2999982247)                                         

 

             K (test code = 4.3 mmol/L   3.5-5.0                   



             4977768419)                                         

 

             CL (test code = 105 mmol/L                       



             3573991193)                                         

 

             CO2 TOTAL (test code = 23 mmol/L    23-31                     



             1683607901)                                         

 

             AGAP (test code =              2-16                      



             8430977961)                                         

 

             BUN (test code = 17 mg/dL     7-23                      



             4695357794)                                         

 

             GLUCOSE (test code = 317 mg/dL           H            



             0743382084)                                         

 

             CREATININE (test code = 0.57 mg/dL   0.60-1.25    L            



             8041972919)                                         

 

             CALCIUM (test code = 9.3 mg/dL    8.6-10.6                  



             7533628256)                                         

 

             eGFR (test code =              mL/min/1.73m2              



             9389862748)                                         

 

             MAL (test code = MAL) Association of                           



                          Glomerular Filtration                           



                          Rate (GFR) and Staging                           



                          of Kidney Disease*                           



                          +---------------------                           



                          --+-------------------                           



                          --+-------------------                           



                          ------+| GFR                           



                          (mL/min/1.73 m2) ?|                           



                          With Kidney Damage ?|                           



                          ?Without Kidney                           



                          Damage+---------------                           



                          --------+-------------                           



                          --------+-------------                           



                          ------------+| ?>90 ?                           



                          ? ? ? ? ? ? ? ?|                           



                          ?Stage one ? ? ? ? ?|                           



                          ? Normal ? ? ? ? ? ? ?                           



                          ?+--------------------                           



                          ---+------------------                           



                          ---+------------------                           



                          -------+| ?60-89 ? ? ?                           



                          ? ? ? ? ?| ?Stage two                           



                          ? ? ? ? ?| ? Decreased                           



                          GFR ? ? ? ?                            



                          +---------------------                           



                          --+-------------------                           



                          --+-------------------                           



                          ------+| ?30-59 ? ? ?                           



                          ? ? ? ? ?| ?Stage                           



                          three ? ? ? ?| ? Stage                           



                          three ? ? ? ? ?                           



                          +---------------------                           



                          --+-------------------                           



                          --+-------------------                           



                          ------+| ?15-29 ? ? ?                           



                          ? ? ? ? ?| ?Stage four                           



                          ? ? ? ? | ? Stage four                           



                          ? ? ? ? ?                              



                          ?+--------------------                           



                          ---+------------------                           



                          ---+------------------                           



                          -------+| ?<15 (or                           



                          dialysis) ? ?| ?Stage                           



                          five ? ? ? ? | ? Stage                           



                          five ? ? ? ? ?                           



                          ?+--------------------                           



                          ---+------------------                           



                          ---+------------------                           



                          -------+ *Each stage                           



                          assumes the associated                           



                          GFR level has been in                           



                          effect for at least                           



                          three months. ?Stages                           



                          1 to 5, with or                           



                          without kidney                           



                          disease, indicate                           



                          chronic kidney                           



                          disease. Notes:                           



                          Determination of                           



                          stages one and two                           



                          (with eGFR                             



                          >59mL/min/1.73 m2)                           



                          requires estimation of                           



                          kidney damage for at                           



                          least three months as                           



                          defined by structural                           



                          or functional                           



                          abnormalities of the                           



                          kidney, manifested by                           



                          either:Pathological                           



                          abnormalities or                           



                          Markers of kidney                           



                          damage (including                           



                          abnormalities in the                           



                          composition of the                           



                          blood or urine or                           



                          abnormalities in                           



                          imaging tests).                           

 

             Lab Interpretation Abnormal                               



             (test code = 49891-4)                                        



[facility] WITH [id number] 18:10:18





             Test Item    Value        Reference Range Interpretation Comments

 

             WBC (test code =              See_Comment                [Automated

 message]



             6690-2)                                             The system [employer]



                                                                 generated this 

result



                                                                 transmitted ref

erence



                                                                 range: 4.20 - 1

0.70



                                                                 10*3/?L. The re

ference



                                                                 range was not u

sed to



                                                                 interpret this 

result



                                                                 as normal/abnor

mal.

 

             RBC (test code =              See_Comment                [Automated

 message]



             789-8)                                              The system [employer]



                                                                 generated this 

result



                                                                 transmitted ref

erence



                                                                 range: 4.26 - 5

.52



                                                                 10*6/?L. The re

ference



                                                                 range was not u

sed to



                                                                 interpret this 

result



                                                                 as normal/abnor

mal.

 

             HGB (test code = 16.0 g/dL    12.2-16.4                 



             718-7)                                              

 

             HCT (test code = 47.3 %       38.4-49.3                 



             4544-3)                                             

 

             MCV (test code = 87.1 fL      81.7-95.6                 



             787-2)                                              

 

             MCH (test code = 29.5 pg      26.1-32.7                 



             785-6)                                              

 

             MCHC (test code = 33.8 g/dL    31.2-35.0                 



             786-4)                                              

 

             RDW-SD (test code 44.4 fL      38.5-51.6                 



             = 62307-0)                                          

 

             RDW-CV (test code 13.8 %       12.1-15.4                 



             = 788-0)                                            

 

             PLT (test code =              See_Comment                [Automated

 message]



             777-3)                                              The system [employer]



                                                                 generated this 

result



                                                                 transmitted ref

erence



                                                                 range: 150 - 32

8



                                                                 10*3/?L. The re

ference



                                                                 range was not u

sed to



                                                                 interpret this 

result



                                                                 as normal/abnor

mal.

 

             MPV (test code = 10.9 fL      9.8-13.0                  



             08987-0)                                            

 

             NRBC/100 WBC (test              See_Comment                [Automat

ed message]



             code = 0817272653)                                        The syste

m which



                                                                 generated this 

result



                                                                 transmitted ref

erence



                                                                 range: 0.0 - 10

.0 /100



                                                                 WBCs. The refer

ence



                                                                 range was not u

sed to



                                                                 interpret this 

result



                                                                 as normal/abnor

mal.

 

             NRBC x10^3 (test <0.01        See_Comment                [Automated

 message]



             code = 9333960041)                                        The syste

m which



                                                                 generated this 

result



                                                                 transmitted ref

erence



                                                                 range: 10*3/?L.

 The



                                                                 reference range

 was not



                                                                 used to interpr

et this



                                                                 result as



                                                                 normal/abnormal

.

 

             GRAN MAT (NEUT) % 53.4 %                                 



             (test code =                                        



             770-8)                                              

 

             IMM GRAN % (test 0.30 %                                 



             code = 0699033665)                                        

 

             LYMPH % (test code 35.3 %                                 



             = 736-9)                                            

 

             MONO % (test code 7.4 %                                  



             = 5905-5)                                           

 

             EOS % (test code = 3.3 %                                  



             713-8)                                              

 

             BASO % (test code 0.3 %                                  



             = 706-2)                                            

 

             GRAN MAT     3.59 10*3/uL 1.99-6.95                 



             x10^3(ANC) (test                                        



             code = 1296862007)                                        

 

             IMM GRAN x10^3 <0.03        0.00-0.06                 



             (test code =                                        



             3964857357)                                         

 

             LYMPH x10^3 (test 2.37 10*3/uL 1.09-3.23                 



             code = 731-0)                                        

 

             MONO x10^3 (test 0.50 10*3/uL 0.36-1.02                 



             code = 742-7)                                        

 

             EOS x10^3 (test 0.22 10*3/uL 0.06-0.53                 



             code = 711-2)                                        

 

             BASO x10^3 (test <0.03        0.01-0.09                 



             code = 704-7)                                        



[facility] GLUCOSE (AUTOMATED)2022 16:50:23





             Test Item    Value        Reference Range Interpretation Comments

 

             POCT GLU (test code = 9769691190) 304 mg/dL           H      

      

 

             Lab Interpretation (test code = Abnormal                           

    



             71052-2)                                            



[facility] GLUCOSE (AUTOMATED)2022 12:58:13





             Test Item    Value        Reference Range Interpretation Comments

 

             POCT GLU (test code = 7647402809) 364 mg/dL           H      

      

 

             Lab Interpretation (test code = Abnormal                           

    



             26323-8)                                            



[facility] GLUCOSE (AUTOMATED)2022 09:42:05





             Test Item    Value        Reference Range Interpretation Comments

 

             POCT GLU (test code = 4637213155) 369 mg/dL           H      

      

 

             Lab Interpretation (test code = Abnormal                           

    



             52327-8)                                            



[facility] GLUCOSE (AUTOMATED)2022 05:09:33





             Test Item    Value        Reference Range Interpretation Comments

 

             POCT GLU (test code = 8371151629) 332 mg/dL           H      

      

 

             Lab Interpretation (test code = Abnormal                           

    



             08574-6)                                            



[facility] GLUCOSE (AUTOMATED)2022 01:27:31





             Test Item    Value        Reference Range Interpretation Comments

 

             POCT GLU (test code = 2224491036) 349 mg/dL           H      

      

 

             Lab Interpretation (test code = Abnormal                           

    



             90619-7)                                            



[facility] GLUCOSE (AUTOMATED)2022 21:42:26





             Test Item    Value        Reference Range Interpretation Comments

 

             POCT GLU (test code = 1500600924) 372 mg/dL           H      

      

 

             Lab Interpretation (test code = Abnormal                           

    



             10550-9)                                            



[facility] GLUCOSE (AUTOMATED)2022 17:17:16





             Test Item    Value        Reference Range Interpretation Comments

 

             POCT GLU (test code = 5092104015) 329 mg/dL           H      

      

 

             Lab Interpretation (test code = Abnormal                           

    



             46583-1)                                            



[facility] GLUCOSE (AUTOMATED)2022 14:09:34





             Test Item    Value        Reference Range Interpretation Comments

 

             POCT GLU (test code = 0234329391) 350 mg/dL           H      

      

 

             Lab Interpretation (test code = Abnormal                           

    



             54852-9)                                            



[facility] GLUCOSE (AUTOMATED)2022 09:59:20





             Test Item    Value        Reference Range Interpretation Comments

 

             POCT GLU (test code = 9240570707) 342 mg/dL           H      

      

 

             Lab Interpretation (test code = Abnormal                           

    



             80638-6)                                            



[facility] GLUCOSE (AUTOMATED)2022 01:46:05





             Test Item    Value        Reference Range Interpretation Comments

 

             POCT GLU (test code = 3495112126) 318 mg/dL           H      

      

 

             Lab Interpretation (test code = Abnormal                           

    



             27069-4)                                            



[facility] GLUCOSE (AUTOMATED)2022 21:25:33





             Test Item    Value        Reference Range Interpretation Comments

 

             POCT GLU (test code = 0290923888) 212 mg/dL           H      

      

 

             Lab Interpretation (test code = Abnormal                           

    



             35361-2)                                            



[facility] GLUCOSE (AUTOMATED)2022 16:27:52





             Test Item    Value        Reference Range Interpretation Comments

 

             POCT GLU (test code = 4853256583) 226 mg/dL           H      

      

 

             Lab Interpretation (test code = Abnormal                           

    



             88600-3)                                            



[facility] GLUCOSE (AUTOMATED)2022 15:37:46





             Test Item    Value        Reference Range Interpretation Comments

 

             POCT GLU (test code = 4684551239) 204 mg/dL           H      

      

 

             Lab Interpretation (test code = Abnormal                           

    



             86710-6)                                            



[facility] GLUCOSE (AUTOMATED)2022 14:39:25





             Test Item    Value        Reference Range Interpretation Comments

 

             POCT GLU (test code = 1579826552) 185 mg/dL           H      

      

 

             Lab Interpretation (test code = Abnormal                           

    



             03483-6)                                            



[facility] Metabolic Panel (Na, K, Cl, CO2, 
Glucose, BUN, Creatinine, Ca)2022 14:07:03





             Test Item    Value        Reference Range Interpretation Comments

 

             NA (test code = 137 mmol/L   135-145                   



             6148980084)                                         

 

             K (test code = 4.6 mmol/L   3.5-5.0                   



             2369845506)                                         

 

             CL (test code = 111 mmol/L          H            



             1901365000)                                         

 

             CO2 TOTAL (test code = 22 mmol/L    23-31        L            



             7625162303)                                         

 

             AGAP (test code =              2-16                      



             4538399466)                                         

 

             BUN (test code = 12 mg/dL     7-23                      



             7415820475)                                         

 

             GLUCOSE (test code = 181 mg/dL           H            



             3639655076)                                         

 

             CREATININE (test code = 0.70 mg/dL   0.60-1.25                 



             4283789093)                                         

 

             CALCIUM (test code = 8.3 mg/dL    8.6-10.6     L            



             1260385440)                                         

 

             eGFR (test code =              mL/min/1.73m2              



             9026274685)                                         

 

             MAL (test code = MAL) Association of                           



                          Glomerular Filtration                           



                          Rate (GFR) and Staging                           



                          of Kidney Disease*                           



                          +---------------------                           



                          --+-------------------                           



                          --+-------------------                           



                          ------+| GFR                           



                          (mL/min/1.73 m2) ?|                           



                          With Kidney Damage ?|                           



                          ?Without Kidney                           



                          Damage+---------------                           



                          --------+-------------                           



                          --------+-------------                           



                          ------------+| ?>90 ?                           



                          ? ? ? ? ? ? ? ?|                           



                          ?Stage one ? ? ? ? ?|                           



                          ? Normal ? ? ? ? ? ? ?                           



                          ?+--------------------                           



                          ---+------------------                           



                          ---+------------------                           



                          -------+| ?60-89 ? ? ?                           



                          ? ? ? ? ?| ?Stage two                           



                          ? ? ? ? ?| ? Decreased                           



                          GFR ? ? ? ?                            



                          +---------------------                           



                          --+-------------------                           



                          --+-------------------                           



                          ------+| ?30-59 ? ? ?                           



                          ? ? ? ? ?| ?Stage                           



                          three ? ? ? ?| ? Stage                           



                          three ? ? ? ? ?                           



                          +---------------------                           



                          --+-------------------                           



                          --+-------------------                           



                          ------+| ?15-29 ? ? ?                           



                          ? ? ? ? ?| ?Stage four                           



                          ? ? ? ? | ? Stage four                           



                          ? ? ? ? ?                              



                          ?+--------------------                           



                          ---+------------------                           



                          ---+------------------                           



                          -------+| ?<15 (or                           



                          dialysis) ? ?| ?Stage                           



                          five ? ? ? ? | ? Stage                           



                          five ? ? ? ? ?                           



                          ?+--------------------                           



                          ---+------------------                           



                          ---+------------------                           



                          -------+ *Each stage                           



                          assumes the associated                           



                          GFR level has been in                           



                          effect for at least                           



                          three months. ?Stages                           



                          1 to 5, with or                           



                          without kidney                           



                          disease, indicate                           



                          chronic kidney                           



                          disease. Notes:                           



                          Determination of                           



                          stages one and two                           



                          (with eGFR                             



                          >59mL/min/1.73 m2)                           



                          requires estimation of                           



                          kidney damage for at                           



                          least three months as                           



                          defined by structural                           



                          or functional                           



                          abnormalities of the                           



                          kidney, manifested by                           



                          either:Pathological                           



                          abnormalities or                           



                          Markers of kidney                           



                          damage (including                           



                          abnormalities in the                           



                          composition of the                           



                          blood or urine or                           



                          abnormalities in                           



                          imaging tests).                           

 

             Lab Interpretation Abnormal                               



             (test code = 27404-5)                                        



[facility] GLUCOSE (AUTOMATED)2022 13:45:48





             Test Item    Value        Reference Range Interpretation Comments

 

             POCT GLU (test code = 8070224947) 170 mg/dL           H      

      

 

             Lab Interpretation (test code = Abnormal                           

    



             28161-0)                                            



[facility] GLUCOSE (AUTOMATED)2022 12:28:12





             Test Item    Value        Reference Range Interpretation Comments

 

             POCT GLU (test code = 0467923449) 109 mg/dL                  

      

 

             Lab Interpretation (test code = Normal                             

    



             73387-7)                                            



[facility] GLUCOSE (AUTOMATED)2022 11:25:37





             Test Item    Value        Reference Range Interpretation Comments

 

             POCT GLU (test code = 6288465917) 134 mg/dL           H      

      

 

             Lab Interpretation (test code = Abnormal                           

    



             24239-5)                                            



[facility] Metabolic Panel (Na, K, Cl, CO2, 
Glucose, BUN, Creatinine, Ca)2022 10:50:17





             Test Item    Value        Reference Range Interpretation Comments

 

             NA (test code = 138 mmol/L   135-145                   



             6074422428)                                         

 

             K (test code = 5.1 mmol/L   3.5-5.0      H            Slight



             7784980865)                                         hemolysis

 

             CL (test code = 110 mmol/L          H            



             2763614526)                                         

 

             CO2 TOTAL (test code 23 mmol/L    23-31                     



             = 0676962358)                                        

 

             AGAP (test code =              2-16                      



             8953199772)                                         

 

             BUN (test code = 13 mg/dL     7-23                      Slight



             9049456643)                                         hemolysis

 

             GLUCOSE (test code = 131 mg/dL           H            



             1984390743)                                         

 

             CREATININE (test code 0.73 mg/dL   0.60-1.25                 



             = 9094234020)                                        

 

             CALCIUM (test code = 8.9 mg/dL    8.6-10.6                  



             1025177439)                                         

 

             eGFR (test code =              mL/min/1.73m2              



             4684346088)                                         

 

             MAL (test code = MAL) Association of                           



                          Glomerular                             



                          Filtration Rate                           



                          (GFR) and Staging                           



                          of Kidney Disease*                           



                          +------------------                           



                          -----+-------------                           



                          --------+----------                           



                          ---------------+|                           



                          GFR (mL/min/1.73                           



                          m2) ?| With Kidney                           



                          Damage ?| ?Without                           



                          Kidney                                 



                          Damage+------------                           



                          -----------+-------                           



                          --------------+----                           



                          -------------------                           



                          --+| ?>90 ? ? ? ? ?                           



                          ? ? ? ?| ?Stage one                           



                          ? ? ? ? ?| ? Normal                           



                          ? ? ? ? ? ? ?                           



                          ?+-----------------                           



                          ------+------------                           



                          ---------+---------                           



                          ----------------+|                           



                          ?60-89 ? ? ? ? ? ?                           



                          ? ?| ?Stage two ? ?                           



                          ? ? ?| ? Decreased                           



                          GFR ? ? ? ?                            



                          +------------------                           



                          -----+-------------                           



                          --------+----------                           



                          ---------------+|                           



                          ?30-59 ? ? ? ? ? ?                           



                          ? ?| ?Stage three ?                           



                          ? ? ?| ? Stage                           



                          three ? ? ? ? ?                           



                          +------------------                           



                          -----+-------------                           



                          --------+----------                           



                          ---------------+|                           



                          ?15-29 ? ? ? ? ? ?                           



                          ? ?| ?Stage four ?                           



                          ? ? ? | ? Stage                           



                          four ? ? ? ? ?                           



                          ?+-----------------                           



                          ------+------------                           



                          ---------+---------                           



                          ----------------+|                           



                          ?<15 (or dialysis)                           



                          ? ?| ?Stage five ?                           



                          ? ? ? | ? Stage                           



                          five ? ? ? ? ?                           



                          ?+-----------------                           



                          ------+------------                           



                          ---------+---------                           



                          ----------------+                           



                          *Each stage assumes                           



                          the associated GFR                           



                          level has been in                           



                          effect for at least                           



                          three months.                           



                          ?Stages 1 to 5,                           



                          with or without                           



                          kidney disease,                           



                          indicate chronic                           



                          kidney disease.                           



                          Notes:                                 



                          Determination of                           



                          stages one and two                           



                          (with eGFR                             



                          >59mL/min/1.73 m2)                           



                          requires estimation                           



                          of kidney damage                           



                          for at least three                           



                          months as defined                           



                          by structural or                           



                          functional                             



                          abnormalities of                           



                          the kidney,                            



                          manifested by                           



                          either:Pathological                           



                          abnormalities or                           



                          Markers of kidney                           



                          damage (including                           



                          abnormalities in                           



                          the composition of                           



                          the blood or urine                           



                          or abnormalities in                           



                          imaging tests).                           

 

             Lab Interpretation Abnormal                               



             (test code = 31700-8)                                        



[facility] GLUCOSE (AUTOMATED)2022 10:35:11





             Test Item    Value        Reference Range Interpretation Comments

 

             POCT GLU (test code = 8208731724) 125 mg/dL           H      

      

 

             Lab Interpretation (test code = Abnormal                           

    



             89223-6)                                            



[facility] GLUCOSE (AUTOMATED)2022 09:31:05





             Test Item    Value        Reference Range Interpretation Comments

 

             POCT GLU (test code = 3858477004) 137 mg/dL           H      

      

 

             Lab Interpretation (test code = Abnormal                           

    



             85230-9)                                            



[facility] GLUCOSE (AUTOMATED)2022 08:28:48





             Test Item    Value        Reference Range Interpretation Comments

 

             POCT GLU (test code = 4058504922) 168 mg/dL           H      

      

 

             Lab Interpretation (test code = Abnormal                           

    



             93100-4)                                            



[facility]





             Test Item    Value        Reference Range Interpretation Comments

 

             POCT GLU (test code = 2671380637) 165 mg/dL           H      

      

 

             Lab Interpretation (test code = Abnormal                           

    



             42039-8)                                            



[facility] Metabolic Panel (Na, K, Cl, CO2, 
Glucose, BUN, Creatinine, Ca)2022 07:18:59





             Test Item    Value        Reference Range Interpretation Comments

 

             NA (test code = 136 mmol/L   135-145                   



             [id number])                                         

 

             K (test code = 5.1 mmol/L   3.5-5.0      H            



             1549815987)                                         

 

             CL (test code = 108 mmol/L                       



             5539466324)                                         

 

             CO2 TOTAL (test code = 24 mmol/L    23-31                     



             5161106466)                                         

 

             AGAP (test code =              2-16                      



             3832362524)                                         

 

             BUN (test code = 14 mg/dL     7-23                      



             9468623299)                                         

 

             GLUCOSE (test code = 202 mg/dL           H            



             0827896744)                                         

 

             CREATININE (test code = 0.79 mg/dL   0.60-1.25                 



             3479684783)                                         

 

             CALCIUM (test code = 8.9 mg/dL    8.6-10.6                  



             5282586436)                                         

 

             eGFR (test code =              mL/min/1.73m2              



             7581165376)                                         

 

             MAL (test code = MAL) Association of                           



                          Glomerular Filtration                           



                          Rate (GFR) and Staging                           



                          of Kidney Disease*                           



                          +---------------------                           



                          --+-------------------                           



                          --+-------------------                           



                          ------+| GFR                           



                          (mL/min/1.73 m2) ?|                           



                          With Kidney Damage ?|                           



                          ?Without Kidney                           



                          Damage+---------------                           



                          --------+-------------                           



                          --------+-------------                           



                          ------------+| ?>90 ?                           



                          ? ? ? ? ? ? ? ?|                           



                          ?Stage one ? ? ? ? ?|                           



                          ? Normal ? ? ? ? ? ? ?                           



                          ?+--------------------                           



                          ---+------------------                           



                          ---+------------------                           



                          -------+| ?60-89 ? ? ?                           



                          ? ? ? ? ?| ?Stage two                           



                          ? ? ? ? ?| ? Decreased                           



                          GFR ? ? ? ?                            



                          +---------------------                           



                          --+-------------------                           



                          --+-------------------                           



                          ------+| ?30-59 ? ? ?                           



                          ? ? ? ? ?| ?Stage                           



                          three ? ? ? ?| ? Stage                           



                          three ? ? ? ? ?                           



                          +---------------------                           



                          --+-------------------                           



                          --+-------------------                           



                          ------+| ?15-29 ? ? ?                           



                          ? ? ? ? ?| ?Stage four                           



                          ? ? ? ? | ? Stage four                           



                          ? ? ? ? ?                              



                          ?+--------------------                           



                          ---+------------------                           



                          ---+------------------                           



                          -------+| ?<15 (or                           



                          dialysis) ? ?| ?Stage                           



                          five ? ? ? ? | ? Stage                           



                          five ? ? ? ? ?                           



                          ?+--------------------                           



                          ---+------------------                           



                          ---+------------------                           



                          -------+ *Each stage                           



                          assumes the associated                           



                          GFR level has been in                           



                          effect for at least                           



                          three months. ?Stages                           



                          1 to 5, with or                           



                          without kidney                           



                          disease, indicate                           



                          chronic kidney                           



                          disease. Notes:                           



                          Determination of                           



                          stages one and two                           



                          (with eGFR                             



                          >59mL/min/1.73 m2)                           



                          requires estimation of                           



                          kidney damage for at                           



                          least three months as                           



                          defined by structural                           



                          or functional                           



                          abnormalities of the                           



                          kidney, manifested by                           



                          either:Pathological                           



                          abnormalities or                           



                          Markers of kidney                           



                          damage (including                           



                          abnormalities in the                           



                          composition of the                           



                          blood or urine or                           



                          abnormalities in                           



                          imaging tests).                           

 

             Lab Interpretation Abnormal                               



             (test code = 88069-3)                                        



[id number]





             Test Item    Value        Reference Range Interpretation Comments

 

             BOH (test code = <0.1         mmol/L                    



             5066053207)                                         

 

             MAL (test code = Normal Ranges: ? ?                           



             MAL)         Nonfasting ? ? ? ? ? Less                           



                          than 0.1 mmol/L ? ?                           



                          Overnight Fast ? ? ? Less                           



                          than 0.4 mmol/L ? ? Fasting                           



                          (1-2 weeks) ?6-8 mmol/L Test                          

 



                          developed and                           



                          characteristics determined                           



                          by [facility].                          

 



[facility] GLUCOSE (AUTOMATED)2022 06:24:07





             Test Item    Value        Reference Range Interpretation Comments

 

             POCT GLU (test code = 8869315972) 212 mg/dL           H      

      

 

             Lab Interpretation (test code = Abnormal                           

    



             42577-2)                                            



[facility] GLUCOSE (AUTOMATED)2022 05:28:19





             Test Item    Value        Reference Range Interpretation Comments

 

             POCT GLU (test code = 1867255436) 239 mg/dL           H      

      

 

             Lab Interpretation (test code = Abnormal                           

    



             93187-7)                                            



[facility] GLUCOSE (AUTOMATED)2022 04:29:31





             Test Item    Value        Reference Range Interpretation Comments

 

             POCT GLU (test code = 5062516178) 251 mg/dL           H      

      

 

             Lab Interpretation (test code = Abnormal                           

    



             96608-6)                                            



[facility], [facility], Cl, CO2, 
Glucose, BUN, Creatinine, Ca)2022 03:49:55





             Test Item    Value        Reference Range Interpretation Comments

 

             NA (test code = 141 mmol/L   135-145                   



             3463937350)                                         

 

             K (test code = 4.2 mmol/L   3.5-5.0                   



             7649058536)                                         

 

             CL (test code = 108 mmol/L                       



             8310598372)                                         

 

             CO2 TOTAL (test code = 26 mmol/L    23-31                     



             3348380933)                                         

 

             AGAP (test code =              2-16                      



             0121325500)                                         

 

             BUN (test code = 14 mg/dL     7-23                      



             9644113173)                                         

 

             GLUCOSE (test code = 164 mg/dL           H            



             3688865937)                                         

 

             CREATININE (test code = 0.84 mg/dL   0.60-1.25                 



             2980774002)                                         

 

             CALCIUM (test code = 9.3 mg/dL    8.6-10.6                  



             7860162958)                                         

 

             eGFR (test code =              mL/min/1.73m2              



             1497970808)                                         

 

             MAL (test code = MAL) Association of                           



                          Glomerular Filtration                           



                          Rate (GFR) and Staging                           



                          of Kidney Disease*                           



                          +---------------------                           



                          --+-------------------                           



                          --+-------------------                           



                          ------+| GFR                           



                          (mL/min/1.73 m2) ?|                           



                          With Kidney Damage ?|                           



                          ?Without Kidney                           



                          Damage+---------------                           



                          --------+-------------                           



                          --------+-------------                           



                          ------------+| ?>90 ?                           



                          ? ? ? ? ? ? ? ?|                           



                          ?Stage one ? ? ? ? ?|                           



                          ? Normal ? ? ? ? ? ? ?                           



                          ?+--------------------                           



                          ---+------------------                           



                          ---+------------------                           



                          -------+| ?60-89 ? ? ?                           



                          ? ? ? ? ?| ?Stage two                           



                          ? ? ? ? ?| ? Decreased                           



                          GFR ? ? ? ?                            



                          +---------------------                           



                          --+-------------------                           



                          --+-------------------                           



                          ------+| ?30-59 ? ? ?                           



                          ? ? ? ? ?| ?Stage                           



                          three ? ? ? ?| ? Stage                           



                          three ? ? ? ? ?                           



                          +---------------------                           



                          --+-------------------                           



                          --+-------------------                           



                          ------+| ?15-29 ? ? ?                           



                          ? ? ? ? ?| ?Stage four                           



                          ? ? ? ? | ? Stage four                           



                          ? ? ? ? ?                              



                          ?+--------------------                           



                          ---+------------------                           



                          ---+------------------                           



                          -------+| ?<15 (or                           



                          dialysis) ? ?| ?Stage                           



                          five ? ? ? ? | ? Stage                           



                          five ? ? ? ? ?                           



                          ?+--------------------                           



                          ---+------------------                           



                          ---+------------------                           



                          -------+ *Each stage                           



                          assumes the associated                           



                          GFR level has been in                           



                          effect for at least                           



                          three months. ?Stages                           



                          1 to 5, with or                           



                          without kidney                           



                          disease, indicate                           



                          chronic kidney                           



                          disease. Notes:                           



                          Determination of                           



                          stages one and two                           



                          (with eGFR                             



                          >59mL/min/1.73 m2)                           



                          requires estimation of                           



                          kidney damage for at                           



                          least three months as                           



                          defined by structural                           



                          or functional                           



                          abnormalities of the                           



                          kidney, manifested by                           



                          either:Pathological                           



                          abnormalities or                           



                          Markers of kidney                           



                          damage (including                           



                          abnormalities in the                           



                          composition of the                           



                          blood or urine or                           



                          abnormalities in                           



                          imaging tests).                           

 

             Lab Interpretation Abnormal                               



             (test code = 48410-8)                                        



[facility] GLUCOSE (AUTOMATED)2022 03:20:15





             Test Item    Value        Reference Range Interpretation Comments

 

             POCT GLU (test code = 8911870488) 174 mg/dL           H      

      

 

             Lab Interpretation (test code = Abnormal                           

    



             45934-1)                                            



[facility] GLUCOSE (AUTOMATED)2022 02:25:54





             Test Item    Value        Reference Range Interpretation Comments

 

             POCT GLU (test code = 7915313484) 158 mg/dL           H      

      

 

             Lab Interpretation (test code = Abnormal                           

    



             68210-7)                                            



[facility] GLUCOSE (AUTOMATED)2022 01:37:09





             Test Item    Value        Reference Range Interpretation Comments

 

             POCT GLU (test code = 6213952806) 219 mg/dL           H      

      

 

             Lab Interpretation (test code = Abnormal                           

    



             30475-6)                                            



[facility] GLUCOSE (AUTOMATED)2022 00:19:48





             Test Item    Value        Reference Range Interpretation Comments

 

             POCT GLU (test code = 6430865213) 345 mg/dL           H      

      

 

             Lab Interpretation (test code = Abnormal                           

    



             57391-0)                                            



[facility] GLUCOSE (AUTOMATED)2022 22:59:57





             Test Item    Value        Reference Range Interpretation Comments

 

             POCT GLU (test code = 3254025919) 318 mg/dL           H      

      

 

             Lab Interpretation (test code = Abnormal                           

    



             54439-3)                                            



[facility] Metabolic Panel (Na, K, Cl, CO2, 
Glucose, BUN, Creatinine, Ca)2022 22:41:00





             Test Item    Value        Reference Range Interpretation Comments

 

             NA (test code = 140 mmol/L   135-145                   



             8917815755)                                         

 

             K (test code = 3.1 mmol/L   3.5-5.0      L            



             9785576685)                                         

 

             CL (test code = 116 mmol/L          H            



             0373377426)                                         

 

             CO2 TOTAL (test code = 21 mmol/L    23-31        L            



             8832571333)                                         

 

             AGAP (test code =              2-16                      



             0970436520)                                         

 

             BUN (test code = 12 mg/dL     7-23                      



             7992838276)                                         

 

             GLUCOSE (test code = 281 mg/dL           H            



             8914145173)                                         

 

             CREATININE (test code = 0.62 mg/dL   0.60-1.25                 



             8863213730)                                         

 

             CALCIUM (test code = 7.2 mg/dL    8.6-10.6     L            



             0961264558)                                         

 

             eGFR (test code =              mL/min/1.73m2              



             7274074411)                                         

 

             MAL (test code = MAL) Association of                           



                          Glomerular Filtration                           



                          Rate (GFR) and Staging                           



                          of Kidney Disease*                           



                          +---------------------                           



                          --+-------------------                           



                          --+-------------------                           



                          ------+| GFR                           



                          (mL/min/1.73 m2) ?|                           



                          With Kidney Damage ?|                           



                          ?Without Kidney                           



                          Damage+---------------                           



                          --------+-------------                           



                          --------+-------------                           



                          ------------+| ?>90 ?                           



                          ? ? ? ? ? ? ? ?|                           



                          ?Stage one ? ? ? ? ?|                           



                          ? Normal ? ? ? ? ? ? ?                           



                          ?+--------------------                           



                          ---+------------------                           



                          ---+------------------                           



                          -------+| ?60-89 ? ? ?                           



                          ? ? ? ? ?| ?Stage two                           



                          ? ? ? ? ?| ? Decreased                           



                          GFR ? ? ? ?                            



                          +---------------------                           



                          --+-------------------                           



                          --+-------------------                           



                          ------+| ?30-59 ? ? ?                           



                          ? ? ? ? ?| ?Stage                           



                          three ? ? ? ?| ? Stage                           



                          three ? ? ? ? ?                           



                          +---------------------                           



                          --+-------------------                           



                          --+-------------------                           



                          ------+| ?15-29 ? ? ?                           



                          ? ? ? ? ?| ?Stage four                           



                          ? ? ? ? | ? Stage four                           



                          ? ? ? ? ?                              



                          ?+--------------------                           



                          ---+------------------                           



                          ---+------------------                           



                          -------+| ?<15 (or                           



                          dialysis) ? ?| ?Stage                           



                          five ? ? ? ? | ? Stage                           



                          five ? ? ? ? ?                           



                          ?+--------------------                           



                          ---+------------------                           



                          ---+------------------                           



                          -------+ *Each stage                           



                          assumes the associated                           



                          GFR level has been in                           



                          effect for at least                           



                          three months. ?Stages                           



                          1 to 5, with or                           



                          without kidney                           



                          disease, indicate                           



                          chronic kidney                           



                          disease. Notes:                           



                          Determination of                           



                          stages one and two                           



                          (with eGFR                             



                          >59mL/min/1.73 m2)                           



                          requires estimation of                           



                          kidney damage for at                           



                          least three months as                           



                          defined by structural                           



                          or functional                           



                          abnormalities of the                           



                          kidney, manifested by                           



                          either:Pathological                           



                          abnormalities or                           



                          Markers of kidney                           



                          damage (including                           



                          abnormalities in the                           



                          composition of the                           



                          blood or urine or                           



                          abnormalities in                           



                          imaging tests).                           

 

             Lab Interpretation Abnormal                               



             (test code = 81350-2)                                        



[facility] GLUCOSE (AUTOMATED)2022 21:53:29





             Test Item    Value        Reference Range Interpretation Comments

 

             POCT GLU (test code = 4246784971) 368 mg/dL           H      

      

 

             Lab Interpretation (test code = Abnormal                           

    



             61867-2)                                            



[facility] GLUCOSE (AUTOMATED)2022 20:41:51





             Test Item    Value        Reference Range Interpretation Comments

 

             POCT GLU (test code = 6250883949) 458 mg/dL           HH     

      

 

             Lab Interpretation (test code = Abnormal                           

    



             79649-3)                                            



[facility] GLUCOSE (AUTOMATED)2022 19:00:13





             Test Item    Value        Reference Range Interpretation Comments

 

             POCT GLU (test code = 9648406898) 369 mg/dL           H      

      

 

             Lab Interpretation (test code = Abnormal                           

    



             68742-9)                                            



[facility]Na, K, Cl, CO2, 
Glucose, BUN, Creatinine, Ca)2022 18:32:29





             Test Item    Value        Reference Range Interpretation Comments

 

             NA (test code = 145 mmol/L   135-145                   



             2481745551)                                         

 

             K (test code = 4.8 mmol/L   3.5-5.0                   Slight



             6759756802)                                         hemolysis

 

             CL (test code = 112 mmol/L          H            



             4564458180)                                         

 

             CO2 TOTAL (test code 26 mmol/L    23-31                     



             = 1984299594)                                        

 

             AGAP (test code =              2-16                      



             8299189823)                                         

 

             BUN (test code = 16 mg/dL     7-23                      Slight



             8307863402)                                         hemolysis

 

             GLUCOSE (test code = 282 mg/dL           H            



             0874001711)                                         

 

             CREATININE (test code 0.83 mg/dL   0.60-1.25                 



             = 2337086235)                                        

 

             CALCIUM (test code = 10.0 mg/dL   8.6-10.6                  



             6087754813)                                         

 

             eGFR (test code =              mL/min/1.73m2              



             0580858262)                                         

 

             MAL (test code = MAL) Association of                           



                          Glomerular                             



                          Filtration Rate                           



                          (GFR) and Staging                           



                          of Kidney Disease*                           



                          +------------------                           



                          -----+-------------                           



                          --------+----------                           



                          ---------------+|                           



                          GFR (mL/min/1.73                           



                          m2) ?| With Kidney                           



                          Damage ?| ?Without                           



                          Kidney                                 



                          Damage+------------                           



                          -----------+-------                           



                          --------------+----                           



                          -------------------                           



                          --+| ?>90 ? ? ? ? ?                           



                          ? ? ? ?| ?Stage one                           



                          ? ? ? ? ?| ? Normal                           



                          ? ? ? ? ? ? ?                           



                          ?+-----------------                           



                          ------+------------                           



                          ---------+---------                           



                          ----------------+|                           



                          ?60-89 ? ? ? ? ? ?                           



                          ? ?| ?Stage two ? ?                           



                          ? ? ?| ? Decreased                           



                          GFR ? ? ? ?                            



                          +------------------                           



                          -----+-------------                           



                          --------+----------                           



                          ---------------+|                           



                          ?30-59 ? ? ? ? ? ?                           



                          ? ?| ?Stage three ?                           



                          ? ? ?| ? Stage                           



                          three ? ? ? ? ?                           



                          +------------------                           



                          -----+-------------                           



                          --------+----------                           



                          ---------------+|                           



                          ?15-29 ? ? ? ? ? ?                           



                          ? ?| ?Stage four ?                           



                          ? ? ? | ? Stage                           



                          four ? ? ? ? ?                           



                          ?+-----------------                           



                          ------+------------                           



                          ---------+---------                           



                          ----------------+|                           



                          ?<15 (or dialysis)                           



                          ? ?| ?Stage five ?                           



                          ? ? ? | ? Stage                           



                          five ? ? ? ? ?                           



                          ?+-----------------                           



                          ------+------------                           



                          ---------+---------                           



                          ----------------+                           



                          *Each stage assumes                           



                          the associated GFR                           



                          level has been in                           



                          effect for at least                           



                          three months.                           



                          ?Stages 1 to 5,                           



                          with or without                           



                          kidney disease,                           



                          indicate chronic                           



                          kidney disease.                           



                          Notes:                                 



                          Determination of                           



                          stages one and two                           



                          (with eGFR                             



                          >59mL/min/1.73 m2)                           



                          requires estimation                           



                          of kidney damage                           



                          for at least three                           



                          months as defined                           



                          by structural or                           



                          functional                             



                          abnormalities of                           



                          the kidney,                            



                          manifested by                           



                          either:Pathological                           



                          abnormalities or                           



                          Markers of kidney                           



                          damage (including                           



                          abnormalities in                           



                          the composition of                           



                          the blood or urine                           



                          or abnormalities in                           



                          imaging tests).                           

 

             Lab Interpretation Abnormal                               



             (test code = 21832-4)                                        



[facility] GLUCOSE (AUTOMATED)2022 17:45:09





             Test Item    Value        Reference Range Interpretation Comments

 

             POCT GLU (test code = 0620295668) 284 mg/dL           H      

      

 

             Lab Interpretation (test code = Abnormal                           

    



             72569-3)                                            



[id number]13





             Test Item    Value        Reference Range Interpretation Comments

 

             BOH (test code = 0.9 mmol/L                             



             3521057400)                                         

 

             MAL (test code = Normal Ranges: ? ?                           



             MAL)         Nonfasting ? ? ? ? ? Less                           



                          than 0.1 mmol/L ? ?                           



                          Overnight Fast ? ? ? Less                           



                          than 0.4 mmol/L ? ? Fasting                           



                          (1-2 weeks) ?6-8 mmol/L Test                          

 



                          developed and                           



                          characteristics determined                           



                          by [facility].                          

 



[facility] GLUCOSE (AUTOMATED)2022 16:33:41





             Test Item    Value        Reference Range Interpretation Comments

 

             POCT GLU (test code = 1721734822) 266 mg/dL           H      

      

 

             Lab Interpretation (test code = Abnormal                           

    



             38701-9)                                            



[facility] GLUCOSE (AUTOMATED)2022 15:30:41





             Test Item    Value        Reference Range Interpretation Comments

 

             POCT GLU (test code = 3048514376) 294 mg/dL           H      

      

 

             Lab Interpretation (test code = Abnormal                           

    



             51996-7)                                            



[facility] GLUCOSE (AUTOMATED)2022 14:23:40





             Test Item    Value        Reference Range Interpretation Comments

 

             POCT GLU (test code = 9722249747) 511 mg/dL           HH     

      

 

             Lab Interpretation (test code = Abnormal                           

    



             04127-0)                                            



[id number] 13:55:53





             Test Item    Value        Reference Range Interpretation Comments

 

             WBC (test code = 6690-2)              See_Comment  H             [A

utomated message]



                                                                 The system [employer]



                                                                 generated this



                                                                 result transmit

huma



                                                                 reference range

:



                                                                 4.20 - 10.70



                                                                 10*3/?L. The



                                                                 reference range

 was



                                                                 not used to



                                                                 interpret this



                                                                 result as



                                                                 normal/abnormal

.

 

             RBC (test code = 789-8)              See_Comment  H             [Au

tomated message]



                                                                 The system [facility]



                                                                 generated this



                                                                 result transmit

huma



                                                                 reference range

:



                                                                 4.26 - 5.52 10*

6/?L.



                                                                 The reference r

zhen



                                                                 was not used to



                                                                 interpret this



                                                                 result as



                                                                 normal/abnormal

.

 

             HGB (test code = 718-7) 16.2 g/dL    12.2-16.4                 

 

             HCT (test code = 4544-3) 48.4 %       38.4-49.3                 

 

             MCH (test code = 785-6) 29.1 pg      26.1-32.7                 

 

             MCV (test code = 787-2) 86.9 fL      81.7-95.6                 

 

             MCHC (test code = 786-4) 33.5 g/dL    31.2-35.0                 

 

             PLT (test code = 777-3)              See_Comment  H             [Au

tomated message]



                                                                 The system [facility]



                                                                 generated this



                                                                 result transmit

huma



                                                                 reference range

: 150



                                                                 - 328 10*3/?L. 

The



                                                                 reference range

 was



                                                                 not used to



                                                                 interpret this



                                                                 result as



                                                                 normal/abnormal

.

 

             MPV (test code = 11.0 fL      9.8-13.0                  



             66026-9)                                            

 

             RDW-CV (test code = 14.2 %       12.1-15.4                 



             788-0)                                              

 

             RDW-SD (test code = 44.8 fL      38.5-51.6                 



             39945-3)                                            

 

             NRBC x10^3 (test code = <0.01        See_Comment                [Au

tomated message]



             1793329530)                                         The system [facility]



                                                                 generated this



                                                                 result transmit

huma



                                                                 reference range

:



                                                                 10*3/?L. The



                                                                 reference range

 was



                                                                 not used to



                                                                 interpret this



                                                                 result as



                                                                 normal/abnormal

.

 

             NRBC/100 WBC (test code              See_Comment                [Au

tomated message]



             = 4397100748)                                        The system [facility]



                                                                 generated this



                                                                 result transmit

huma



                                                                 reference range

: 0.0



                                                                 - 10.0 /100 WBC

s.



                                                                 The reference r

zhen



                                                                 was not used to



                                                                 interpret this



                                                                 result as



                                                                 normal/abnormal

.

 

             IPF % (test code =                                        



             7872240871)                                         

 

             Lab Interpretation (test Abnormal                               



             code = 83362-7)                                        



[facility] GLUCOSE (AUTOMATED)2022 13:34:21





             Test Item    Value        Reference Range Interpretation Comments

 

             POCT GLU (test code = 3790938952) 538 mg/dL           HH     

      

 

             Lab Interpretation (test code = Abnormal                           

    



             92182-8)                                            



[facility] GLUCOSE (AUTOMATED)2022 13:30:43





             Test Item    Value        Reference Range Interpretation Comments

 

             POCT GLU (test code = 5002012921) >600                HH     

      

 

             Lab Interpretation (test code = Abnormal                           

    



             35419-9)                                            



[facility] GLUCOSE (AUTOMATED)2022 13:30:43





             Test Item    Value        Reference Range Interpretation Comments

 

             POCT GLU (test code = 0857401766) >600                HH     

      

 

             Lab Interpretation (test code = Abnormal                           

    



             38890-4)                                            



[facility] GLUCOSE (AUTOMATED)2022 13:30:43





             Test Item    Value        Reference Range Interpretation Comments

 

             POCT GLU (test code = >600                HH           Notifi

ed Provider



             1021710671)                                         

 

             Lab Interpretation (test Abnormal                               



             code = 69625-9)                                        



[facility] GLUCOSE (AUTOMATED)2022 13:30:43





             Test Item    Value        Reference Range Interpretation Comments

 

             POCT GLU (test code = 4950628392) >600                HH     

      

 

             Lab Interpretation (test code = Abnormal                           

    



             90941-6)                                            



[facility] GLUCOSE (AUTOMATED)2022 13:30:38





             Test Item    Value        Reference Range Interpretation Comments

 

             POCT GLU (test code = 4237735031) >600                HH     

      

 

             Lab Interpretation (test code = Abnormal                           

    



             40829-3)                                            



[facility] GLUCOSE (AUTOMATED)2022 13:30:38





             Test Item    Value        Reference Range Interpretation Comments

 

             POCT GLU (test code = 6711621174) >600                HH     

      

 

             Lab Interpretation (test code = Abnormal                           

    



             79733-2)                                            



[facility] HospitalOsmolality [id number] 12:37:44





             Test Item    Value        Reference Range Interpretation Comments

 

             OSMOLALITY (test code =              See_Comment  HH            [Au

tomated message]



             2692-2)                                             The system [employer]



                                                                 generated this 

result



                                                                 transmitted ref

erence



                                                                 range: 278 - 30

5



                                                                 mOsm/kg. The



                                                                 reference range

 was



                                                                 not used to int

erpret



                                                                 this result as



                                                                 normal/abnormal

.

 

             Lab Interpretation (test Abnormal                               



             code = 49892-2)                                        



[facility] Metabolic Panel (Na, K, Cl, CO2, 
Glucose, BUN, Creatinine, Ca)2022 12:23:35





             Test Item    Value        Reference Range Interpretation Comments

 

             NA (test code = 145 mmol/L   135-145                   



             9330722274)                                         

 

             K (test code = 4.4 mmol/L   3.5-5.0                   



             4719810381)                                         

 

             CL (test code = 109 mmol/L          H            



             7229229259)                                         

 

             CO2 TOTAL (test code = 21 mmol/L    23-31        L            



             0375040800)                                         

 

             AGAP (test code =              2-16                      



             1647690180)                                         

 

             BUN (test code = 15 mg/dL     7-23                      



             4267267654)                                         

 

             GLUCOSE (test code = 753 mg/dL           HH           



             6797879728)                                         

 

             CREATININE (test code = 0.79 mg/dL   0.60-1.25                 



             3275151598)                                         

 

             CALCIUM (test code = 10.9 mg/dL   8.6-10.6     H            



             1102154800)                                         

 

             eGFR (test code =              mL/min/1.73m2              



             3087351593)                                         

 

             MAL (test code = MAL) Association of                           



                          Glomerular Filtration                           



                          Rate (GFR) and Staging                           



                          of Kidney Disease*                           



                          +---------------------                           



                          --+-------------------                           



                          --+-------------------                           



                          ------+| GFR                           



                          (mL/min/1.73 m2) ?|                           



                          With Kidney Damage ?|                           



                          ?Without Kidney                           



                          Damage+---------------                           



                          --------+-------------                           



                          --------+-------------                           



                          ------------+| ?>90 ?                           



                          ? ? ? ? ? ? ? ?|                           



                          ?Stage one ? ? ? ? ?|                           



                          ? Normal ? ? ? ? ? ? ?                           



                          ?+--------------------                           



                          ---+------------------                           



                          ---+------------------                           



                          -------+| ?60-89 ? ? ?                           



                          ? ? ? ? ?| ?Stage two                           



                          ? ? ? ? ?| ? Decreased                           



                          GFR ? ? ? ?                            



                          +---------------------                           



                          --+-------------------                           



                          --+-------------------                           



                          ------+| ?30-59 ? ? ?                           



                          ? ? ? ? ?| ?Stage                           



                          three ? ? ? ?| ? Stage                           



                          three ? ? ? ? ?                           



                          +---------------------                           



                          --+-------------------                           



                          --+-------------------                           



                          ------+| ?15-29 ? ? ?                           



                          ? ? ? ? ?| ?Stage four                           



                          ? ? ? ? | ? Stage four                           



                          ? ? ? ? ?                              



                          ?+--------------------                           



                          ---+------------------                           



                          ---+------------------                           



                          -------+| ?<15 (or                           



                          dialysis) ? ?| ?Stage                           



                          five ? ? ? ? | ? Stage                           



                          five ? ? ? ? ?                           



                          ?+--------------------                           



                          ---+------------------                           



                          ---+------------------                           



                          -------+ *Each stage                           



                          assumes the associated                           



                          GFR level has been in                           



                          effect for at least                           



                          three months. ?Stages                           



                          1 to 5, with or                           



                          without kidney                           



                          disease, indicate                           



                          chronic kidney                           



                          disease. Notes:                           



                          Determination of                           



                          stages one and two                           



                          (with eGFR                             



                          >59mL/min/1.73 m2)                           



                          requires estimation of                           



                          kidney damage for at                           



                          least three months as                           



                          defined by structural                           



                          or functional                           



                          abnormalities of the                           



                          kidney, manifested by                           



                          either:Pathological                           



                          abnormalities or                           



                          Markers of kidney                           



                          damage (including                           



                          abnormalities in the                           



                          composition of the                           



                          blood or urine or                           



                          abnormalities in                           



                          imaging tests).                           

 

             Lab Interpretation Abnormal                               



             (test code = 08734-2)                                        



[facility] 12:14:56





             Test Item    Value        Reference Range Interpretation Comments

 

             POCT GLU (test code = 2317582288) 564 mg/dL           HH     

      

 

             Lab Interpretation (test code = Abnormal                           

    



             17606-7)                                            



[facility] HospitalMagnesium [id number] 12:05:40





             Test Item    Value        Reference Range Interpretation Comments

 

             MAGNESIUM (test code = 9777279926) 2.5 mg/dL    1.7-2.4      H     

       

 

             Lab Interpretation (test code = Abnormal                           

    



             60872-0)                                            



[facility]





             Test Item    Value        Reference Range Interpretation Comments

 

             MAGNESIUM (test code = 0733186441) 2.5 mg/dL    1.7-2.4      H     

       

 

             Lab Interpretation (test code = Abnormal                           

    



             54344-7)                                            



[facility] Hospital[facility]osph[facility] [id number] 12:05:20





             Test Item    Value        Reference Range Interpretation Comments

 

             PHOSPHORUS (test code = 0609394288) 6.2 mg/dL    2.5-5.0      H    

        

 

             Lab Interpretation (test code = Abnormal                           

    



             23663-0)                                            



[facility] + LACTIC [id number] 05:53:48





             Test Item    Value        Reference Range Interpretation Comments

 

             PH (test code =              7.32-7.42                 



             7276463579)                                         

 

             PCO2 MARCELINA (test code              See_Comment                [Automa

huma



             = 3441448848)                                        message] The



                                                                 system which



                                                                 generated this



                                                                 result



                                                                 transmitted



                                                                 reference range

:



                                                                 41 - 51 mmHg. T

he



                                                                 reference range



                                                                 was not used to



                                                                 interpret this



                                                                 result as



                                                                 normal/abnormal

.

 

             PO2 MARCELINA (test code =              See_Comment  HH            [Autom

ated



             3085529201)                                         message] The



                                                                 system which



                                                                 generated this



                                                                 result



                                                                 transmitted



                                                                 reference range

:



                                                                 25 - 40 mmHg. T

he



                                                                 reference range



                                                                 was not used to



                                                                 interpret this



                                                                 result as



                                                                 normal/abnormal

.

 

             HCO3 MARCELINA (test code              See_Comment  L             [Automa

huma



             = 2296174807)                                        message] The



                                                                 system which



                                                                 generated this



                                                                 result



                                                                 transmitted



                                                                 reference range

:



                                                                 24 - 28 mEq/L.



                                                                 The reference



                                                                 range was not



                                                                 used to interpr

et



                                                                 this result as



                                                                 normal/abnormal

.

 

             AC VBE(BEAKER) (test              mEq/L                     



             code = 9810981059)                                        

 

             THB MARCELINA (test code = 17.7 g/dL    13.5-18.0                 



             6089878530)                                         

 

             %O2HB MARCELINA (test code 93.8 %       52.0-63.0    H            



             = 1418836648)                                        

 

             %COHB MARCELINA (test code 2.1 %        0.0-1.5      H            



             = 8535394007)                                        

 

             %METHB MARCELINA (test 0.1 %        0.4-1.5      L            



             code = 7189833089)                                        

 

             VOL%O2 MARCELINA (test 23.3 %       6.0-12.0     H            



             code = 4457974907)                                        

 

             NA (test code = 142 mmol/L   135-145                   



             3451626776)                                         

 

             K+ (test code = 4.4 mmol/L   3.5-5.0                   



             6564630070)                                         

 

             AC CA IONZ (test 5.40 mg/dL   4.50-5.30    H            



             code = 1278319928)                                        

 

             GLUCOSE (test code = <20                 LL           



             5518220744)                                         

 

             LACTIC ACID (test 3.37 mmol/L  0.50-2.20    H            



             code = 4140265556)                                        

 

             MAL (test code = *ac gluc                               



             MAL)         unmeasurable                           

 

             Lab Interpretation Abnormal                               



             (test code =                                        



             40219-8)                                            



[facility]. METABOLIC PANEL (14236)2022 
04:59:53





             Test Item    Value        Reference Range Interpretation Comments

 

             NA (test code = 140 mmol/L   135-145                   



             3354529922)                                         

 

             K (test code = 4.8 mmol/L   3.5-5.0                   



             9981885891)                                         

 

             CL (test code = 100 mmol/L                       



             8552032270)                                         

 

             CO2 TOTAL (test code = 20 mmol/L    23-31        L            



             1292222388)                                         

 

             AGAP (test code =              2-16         H            



             4724286236)                                         

 

             BUN (test code = 14 mg/dL     7-23                      



             5233031743)                                         

 

             GLUCOSE (test code = 1083 mg/dL          HH           



             7541004961)                                         

 

             CREATININE (test code = 0.80 mg/dL   0.60-1.25                 



             4198323877)                                         

 

             TOTAL BILI (test code = 1.0 mg/dL    0.1-1.1                   



             8936145016)                                         

 

             CALCIUM (test code = 12.0 mg/dL   8.6-10.6     H            



             2256359246)                                         

 

             T PROTEIN (test code = 8.1 g/dL     6.3-8.2                   



             3877043741)                                         

 

             ALBUMIN (test code = 4.7 g/dL     3.5-5.0                   



             6216760924)                                         

 

             ALK PHOS (test code = 173 U/L             H            



             6490730694)                                         

 

             ALTv (test code = 36 U/L       5-50                      



             1742-6)                                             

 

             AST(SGOT) (test code = 18 U/L       13-40                     



             9335573188)                                         

 

             eGFR (test code =              mL/min/1.73m2              



             6889080699)                                         

 

             MAL (test code = MAL) Association of                           



                          Glomerular Filtration                           



                          Rate (GFR) and Staging                           



                          of Kidney Disease*                           



                          +---------------------                           



                          --+-------------------                           



                          --+-------------------                           



                          ------+| GFR                           



                          (mL/min/1.73 m2) ?|                           



                          With Kidney Damage ?|                           



                          ?Without Kidney                           



                          Damage+---------------                           



                          --------+-------------                           



                          --------+-------------                           



                          ------------+| ?>90 ?                           



                          ? ? ? ? ? ? ? ?|                           



                          ?Stage one ? ? ? ? ?|                           



                          ? Normal ? ? ? ? ? ? ?                           



                          ?+--------------------                           



                          ---+------------------                           



                          ---+------------------                           



                          -------+| ?60-89 ? ? ?                           



                          ? ? ? ? ?| ?Stage two                           



                          ? ? ? ? ?| ? Decreased                           



                          GFR ? ? ? ?                            



                          +---------------------                           



                          --+-------------------                           



                          --+-------------------                           



                          ------+| ?30-59 ? ? ?                           



                          ? ? ? ? ?| ?Stage                           



                          three ? ? ? ?| ? Stage                           



                          three ? ? ? ? ?                           



                          +---------------------                           



                          --+-------------------                           



                          --+-------------------                           



                          ------+| ?15-29 ? ? ?                           



                          ? ? ? ? ?| ?Stage four                           



                          ? ? ? ? | ? Stage four                           



                          ? ? ? ? ?                              



                          ?+--------------------                           



                          ---+------------------                           



                          ---+------------------                           



                          -------+| ?<15 (or                           



                          dialysis) ? ?| ?Stage                           



                          five ? ? ? ? | ? Stage                           



                          five ? ? ? ? ?                           



                          ?+--------------------                           



                          ---+------------------                           



                          ---+------------------                           



                          -------+ *Each stage                           



                          assumes the associated                           



                          GFR level has been in                           



                          effect for at least                           



                          three months. ?Stages                           



                          1 to 5, with or                           



                          without kidney                           



                          disease, indicate                           



                          chronic kidney                           



                          disease. Notes:                           



                          Determination of                           



                          stages one and two                           



                          (with eGFR                             



                          >59mL/min/1.73 m2)                           



                          requires estimation of                           



                          kidney damage for at                           



                          least three months as                           



                          defined by structural                           



                          or functional                           



                          abnormalities of the                           



                          kidney, manifested by                           



                          either:Pathological                           



                          abnormalities or                           



                          Markers of kidney                           



                          damage (including                           



                          abnormalities in the                           



                          composition of the                           



                          blood or urine or                           



                          abnormalities in                           



                          imaging tests).                           

 

             Lab Interpretation Abnormal                               



             (test code = 55115-8)                                        



[id number]





             Test Item    Value        Reference    Interpretation Comments



                                       Range                     

 

             TROPONIN I (test <0.012       See_Comment                [Automated



             code = 7203993794)                                        message] 

The



                                                                 system which



                                                                 generated this



                                                                 result



                                                                 transmitted



                                                                 reference range

:



                                                                 <=0.034 ng/mL.



                                                                 The reference



                                                                 range was not



                                                                 used to



                                                                 interpret this



                                                                 result as



                                                                 normal/abnormal

.

 

             MAL (test code = Reference (Normal)                           



             MAL)         Range (defined by                           



                          the 99th percentile                           



                          reference limit): <=                           



                          0.034 ng/mL Note:                           



                          Cardiac troponin                           



                          begins to rise 3-4                           



                          hours after the                           



                          onset of ischemia.                           



                          Repeat in 4-6 hours                           



                          if the sample was                           



                          drawn within 3-4                           



                          hours of the onset                           



                          of the symptom and                           



                          found normal.                           



                          Diagnosis of                           



                          myocardial injury is                           



                          made with acute                           



                          changes in cTn                           



                          concentrations with                           



                          at least one serial                           



                          sample above the                           



                          99th percentile                           



                          upper reference                           



                          limit (URL), taken                           



                          together with the                           



                          patient's clinical                           



                          presentation. Biotin                           



                          has been reported to                           



                          cause a negative                           



                          bias, interpret                           



                          results relative to                           



                          patient's use of                           



                          biotin.                                

 

             Lab Interpretation Normal                                 



             (test code =                                        



             31123-2)                                            



[id number]23





             Test Item    Value        Reference Range Interpretation Comments

 

             NT-proBNP (test code 71 pg/mL     See_Comment                [Autom

ated



             = 5428264413)                                        message] The



                                                                 system which



                                                                 generated this



                                                                 result



                                                                 transmitted



                                                                 reference range

:



                                                                 <=125. The



                                                                 reference range



                                                                 was not used to



                                                                 interpret this



                                                                 result as



                                                                 normal/abnormal

.

 

             MAL (test code = MAL) Biotin has been                           



                          reported to                            



                          cause a negative                           



                          bias, interpret                           



                          results relative                           



                          to patient's use                           



                          of biotin.                             

 

             Lab Interpretation Normal                                 



             (test code = 56741-0)                                        



[id number] 03:48:23





             Test Item    Value        Reference Range Interpretation Comments

 

             NT-proBNP (test code 71 pg/mL     See_Comment                [Autom

ated



             = 7749679918)                                        message] The



                                                                 system which



                                                                 generated this



                                                                 result



                                                                 transmitted



                                                                 reference range

:



                                                                 <=125. The



                                                                 reference range



                                                                 was not used to



                                                                 interpret this



                                                                 result as



                                                                 normal/abnormal

.

 

             MAL (test code = MAL) Biotin has been                           



                          reported to                            



                          cause a negative                           



                          bias, interpret                           



                          results relative                           



                          to patient's use                           



                          of biotin.                             

 

             Lab Interpretation Normal                                 



             (test code = 72454-4)                                        



[facility]





             Test Item    Value        Reference Range Interpretation Comments

 

             LIPASE (test code = 6418147068) 120 U/L      0-220                 

    

 

             Lab Interpretation (test code = Normal                             

    



             22887-3)                                            



[facility] [id number] 
03:38:23





             Test Item    Value        Reference Range Interpretation Comments

 

             APTT Patient (test              See_Comment                [Automat

ed



             code = 3173-2)                                        message] The



                                                                 system which



                                                                 generated this



                                                                 result



                                                                 transmitted



                                                                 reference range

:



                                                                 23 - 38 Seconds

.



                                                                 The reference



                                                                 range was not



                                                                 used to interpr

et



                                                                 this result as



                                                                 normal/abnormal

.

 

             MAL (test code = MAL) The [facility] patient                           



                          population mean                           



                          normal value for                           



                          aPTT is 30                             



                          seconds.                               

 

             Lab Interpretation Normal                                 



             (test code = 41379-9)                                        



[facility] [id number] 
03:38:23





             Test Item    Value        Reference Range Interpretation Comments

 

             APTT Patient (test              See_Comment                [Automat

ed



             code = 3173-2)                                        message] The



                                                                 system which



                                                                 generated this



                                                                 result



                                                                 transmitted



                                                                 reference range

:



                                                                 23 - 38 Seconds

.



                                                                 The reference



                                                                 range was not



                                                                 used to interpr

et



                                                                 this result as



                                                                 normal/abnormal

.

 

             MAL (test code = MAL) The [facility] patient                           



                          population mean                           



                          normal value for                           



                          aPTT is 30                             



                          seconds.                               

 

             Lab Interpretation Normal                                 



             (test code = 04671-3)                                        



[id number] 03:36:22





             Test Item    Value        Reference Range Interpretation Comments

 

             PROTIME PATIENT (test              See_Comment                [Auto

mated message]



             code = 5964-2)                                        The system wh

ich



                                                                 generated this 

result



                                                                 transmitted ref

erence



                                                                 range: 12.0 - 1

4.7



                                                                 Seconds. The re

ference



                                                                 range was not u

sed to



                                                                 interpret this 

result



                                                                 as normal/abnor

mal.

 

             INR (test code = 6301-6)                                        Nor

mal INR <1.1;



                                                                 Warfarin Therap

eutic



                                                                 range 2.0 to 3.

0 or



                                                                 2.5 to 3.5, dep

ending



                                                                 upon the indica

tions.

 

             Lab Interpretation (test Normal                                 



             code = 83329-5)                                        



[id number] 03:35:42





             Test Item    Value        Reference Range Interpretation Comments

 

             WBC (test code =              See_Comment  H             [Automated



             7690-2)                                             message] The



                                                                 system which



                                                                 generated this



                                                                 result transmit

huma



                                                                 reference range

:



                                                                 4.20 - 10.70



                                                                 10*3/?L. The



                                                                 reference range



                                                                 was not used to



                                                                 interpret this



                                                                 result as



                                                                 normal/abnormal

.

 

             RBC (test code =              See_Comment  H             [Automated



             089-8)                                              message] The



                                                                 system which



                                                                 generated this



                                                                 result transmit

huma



                                                                 reference range

:



                                                                 4.26 - 5.52



                                                                 10*6/?L. The



                                                                 reference range



                                                                 was not used to



                                                                 interpret this



                                                                 result as



                                                                 normal/abnormal

.

 

             HGB (test code = 17.8 g/dL    12.2-16.4    H            



             718-7)                                              

 

             HCT (test code = 51.7 %       38.4-49.3    H            



             4544-3)                                             

 

             MCV (test code = 86.9 fL      81.7-95.6                 



             787-2)                                              

 

             MCH (test code = 29.9 pg      26.1-32.7                 



             785-6)                                              

 

             MCHC (test code = 34.4 g/dL    31.2-35.0                 



             786-4)                                              

 

             RDW-SD (test code = 44.7 fL      38.5-51.6                 



             47523-2)                                            

 

             RDW-CV (test code = 14.2 %       12.1-15.4                 



             788-0)                                              

 

             PLT (test code =              See_Comment  H             [Automated



             777-3)                                              message] The



                                                                 system which



                                                                 generated this



                                                                 result transmit

huma



                                                                 reference range

:



                                                                 150 - 328 10*3/

?L.



                                                                 The reference



                                                                 range was not u

sed



                                                                 to interpret th

is



                                                                 result as



                                                                 normal/abnormal

.

 

             MPV (test code = 11.5 fL      9.8-13.0                  



             84370-1)                                            

 

             NRBC/100 WBC (test              See_Comment                [Automat

ed



             code = 4695552430)                                        message] 

The



                                                                 system which



                                                                 generated this



                                                                 result transmit

huma



                                                                 reference range

:



                                                                 0.0 - 10.0 /100



                                                                 WBCs. The



                                                                 reference range



                                                                 was not used to



                                                                 interpret this



                                                                 result as



                                                                 normal/abnormal

.

 

             NRBC x10^3 (test code <0.01        See_Comment                [Auto

mated



             = 1126200911)                                        message] The



                                                                 system which



                                                                 generated this



                                                                 result transmit

huma



                                                                 reference range

:



                                                                 10*3/?L. The



                                                                 reference range



                                                                 was not used to



                                                                 interpret this



                                                                 result as



                                                                 normal/abnormal

.

 

             GRAN MAT (NEUT) % 85.2 %                                 



             (test code = 770-8)                                        

 

             IMM GRAN % (test code 0.90 %                                 



             = 0498914810)                                        

 

             LYMPH % (test code = 5.9 %                                  



             736-9)                                              

 

             MONO % (test code = 7.8 %                                  



             5905-5)                                             

 

             EOS % (test code = 0.0 %                                  



             713-8)                                              

 

             BASO % (test code = 0.2 %                                  



             706-2)                                              

 

             GRAN MAT x10^3(ANC) 12.85 10*3/uL 1.99-6.95    H            



             (test code =                                        



             2846977420)                                         

 

             IMM GRAN x10^3 (test 0.13 10*3/uL 0.00-0.06    H            



             code = 0034409019)                                        

 

             LYMPH x10^3 (test code 0.89 10*3/uL 1.09-3.23    L            



             = 731-0)                                            

 

             MONO x10^3 (test code 1.17 10*3/uL 0.36-1.02    H            



             = 742-7)                                            

 

             EOS x10^3 (test code = <0.03        0.06-0.53    L            



             711-2)                                              

 

             BASO x10^3 (test code 0.03 10*3/uL 0.01-0.09                 



             = 704-7)                                            

 

             Lab Interpretation Abnormal                               



             (test code = 56817-7)                                        



[id number] 03:22:23





             Test Item    Value        Reference Range Interpretation Comments

 

             LACTIC ACID (test code = 4.52 mmol/L  0.50-2.20    H            



             4123198419)                                         

 

             Lab Interpretation (test code = Abnormal                           

    



             70126-8)                                            



[facility] METABOLIC PANEL (NA, K, CL, CO2, 
GLUCOSE, BUN, CREATININE, CA)2022 11:13:09





             Test Item    Value        Reference Range Interpretation Comments

 

             NA (test code = 137 mmol/L   135-145                   



             1281554857)                                         

 

             K (test code = 4.8 mmol/L   3.5-5.0                   



             4048016895)                                         

 

             CL (test code = 101 mmol/L                       



             5207227585)                                         

 

             CO2 TOTAL (test code = 24 mmol/L    23-31                     



             3580781862)                                         

 

             AGAP (test code =              2-16                      



             0978240160)                                         

 

             BUN (test code = 17 mg/dL     7-23                      



             5682267662)                                         

 

             GLUCOSE (test code = 323 mg/dL           H            



             5951688665)                                         

 

             CREATININE (test code = 0.56 mg/dL   0.60-1.25    L            



             7846209905)                                         

 

             CALCIUM (test code = 9.6 mg/dL    8.6-10.6                  



             6513374167)                                         

 

             eGFR (test code =              mL/min/1.73m2              



             [id number])                                         

 

             MAL (test code = MAL) Association of                           



                          Glomerular Filtration                           



                          Rate (GFR) and Staging                           



                          of Kidney Disease*                           



                          +---------------------                           



                          --+-------------------                           



                          --+-------------------                           



                          ------+| GFR                           



                          (mL/min/1.73 m2) ?|                           



                          With Kidney Damage ?|                           



                          ?Without Kidney                           



                          Damage+---------------                           



                          --------+-------------                           



                          --------+-------------                           



                          ------------+| ?>90 ?                           



                          ? ? ? ? ? ? ? ?|                           



                          ?Stage one ? ? ? ? ?|                           



                          ? Normal ? ? ? ? ? ? ?                           



                          ?+--------------------                           



                          ---+------------------                           



                          ---+------------------                           



                          -------+| ?60-89 ? ? ?                           



                          ? ? ? ? ?| ?Stage two                           



                          ? ? ? ? ?| ? Decreased                           



                          GFR ? ? ? ?                            



                          +---------------------                           



                          --+-------------------                           



                          --+-------------------                           



                          ------+| ?30-59 ? ? ?                           



                          ? ? ? ? ?| ?Stage                           



                          three ? ? ? ?| ? Stage                           



                          three ? ? ? ? ?                           



                          +---------------------                           



                          --+-------------------                           



                          --+-------------------                           



                          ------+| ?15-29 ? ? ?                           



                          ? ? ? ? ?| ?Stage four                           



                          ? ? ? ? | ? Stage four                           



                          ? ? ? ? ?                              



                          ?+--------------------                           



                          ---+------------------                           



                          ---+------------------                           



                          -------+| ?<15 (or                           



                          dialysis) ? ?| ?Stage                           



                          five ? ? ? ? | ? Stage                           



                          five ? ? ? ? ?                           



                          ?+--------------------                           



                          ---+------------------                           



                          ---+------------------                           



                          -------+ *Each stage                           



                          assumes the associated                           



                          GFR level has been in                           



                          effect for at least                           



                          three months. ?Stages                           



                          1 to 5, with or                           



                          without kidney                           



                          disease, indicate                           



                          chronic kidney                           



                          disease. Notes:                           



                          Determination of                           



                          stages one and two                           



                          (with eGFR                             



                          >59mL/min/1.73 m2)                           



                          requires estimation of                           



                          kidney damage for at                           



                          least three months as                           



                          defined by structural                           



                          or functional                           



                          abnormalities of the                           



                          kidney, manifested by                           



                          either:Pathological                           



                          abnormalities or                           



                          Markers of kidney                           



                          damage (including                           



                          abnormalities in the                           



                          composition of the                           



                          blood or urine or                           



                          abnormalities in                           



                          imaging tests).                           

 

             Lab Interpretation Abnormal                               



             (test code = 45376-1)                                        



[facility] WITH [id number] 10:49:07





             Test Item    Value        Reference Range Interpretation Comments

 

             WBC (test code =              See_Comment  H             [Automated



             7785-2)                                             message] The sy

stem



                                                                 which generated



                                                                 this result



                                                                 transmitted



                                                                 reference range

:



                                                                 4.20 - 10.70



                                                                 10*3/?L. The



                                                                 reference range

 was



                                                                 not used to



                                                                 interpret this



                                                                 result as



                                                                 normal/abnormal

.

 

             RBC (test code =              See_Comment                [Automated



             086-8)                                              message] The sy

stem



                                                                 which generated



                                                                 this result



                                                                 transmitted



                                                                 reference range

:



                                                                 4.26 - 5.52



                                                                 10*6/?L. The



                                                                 reference range

 was



                                                                 not used to



                                                                 interpret this



                                                                 result as



                                                                 normal/abnormal

.

 

             HGB (test code = 15.6 g/dL    12.2-16.4                 



             718-7)                                              

 

             HCT (test code = 46.6 %       38.4-49.3                 



             4544-3)                                             

 

             MCV (test code = 88.3 fL      81.7-95.6                 



             787-2)                                              

 

             MCH (test code = 29.5 pg      26.1-32.7                 



             785-6)                                              

 

             MCHC (test code = 33.5 g/dL    31.2-35.0                 



             786-4)                                              

 

             RDW-SD (test code = 46.7 fL      38.5-51.6                 



             39861-3)                                            

 

             RDW-CV (test code = 14.5 %       12.1-15.4                 



             788-0)                                              

 

             PLT (test code =              See_Comment  H             [Automated



             777-3)                                              message] The sy

stem



                                                                 which generated



                                                                 this result



                                                                 transmitted



                                                                 reference range

:



                                                                 150 - 328 10*3/

?L.



                                                                 The reference r

zhen



                                                                 was not used to



                                                                 interpret this



                                                                 result as



                                                                 normal/abnormal

.

 

             MPV (test code = 10.2 fL      9.8-13.0                  



             82742-2)                                            

 

             NRBC/100 WBC (test              See_Comment                [Automat

ed



             code = [id number])                                        message] 

The system



                                                                 which generated



                                                                 this result



                                                                 transmitted



                                                                 reference range

:



                                                                 0.0 - 10.0 /100



                                                                 WBCs. The refer

ence



                                                                 range was not u

sed



                                                                 to interpret th

is



                                                                 result as



                                                                 normal/abnormal

.

 

             NRBC x10^3 (test code <0.01        See_Comment                [Auto

mated



             = [id number])                                        message] The s

ystem



                                                                 which generated



                                                                 this result



                                                                 transmitted



                                                                 reference range

:



                                                                 10*3/?L. The



                                                                 reference range

 was



                                                                 not used to



                                                                 interpret this



                                                                 result as



                                                                 normal/abnormal

.

 

             GRAN MAT (NEUT) % 70.2 %                                 



             (test code = 770-8)                                        

 

             IMM GRAN % (test code 0.80 %                                 



             = [id number])                                        

 

             LYMPH % (test code = 18.0 %                                 



             736-9)                                              

 

             MONO % (test code = 8.5 %                                  



             5905-5)                                             

 

             EOS % (test code = 2.1 %                                  



             713-8)                                              

 

             BASO % (test code = 0.4 %                                  



             706-2)                                              

 

             GRAN MAT x10^3(ANC) 7.63 10*3/uL 1.99-6.95    H            



             (test code =                                        



             1382438470)                                         

 

             IMM GRAN x10^3 (test 0.09 10*3/uL 0.00-0.06    H            



             code = 5449202631)                                        

 

             LYMPH x10^3 (test code 1.96 10*3/uL 1.09-3.23                 



             = 731-0)                                            

 

             MONO x10^3 (test code 0.92 10*3/uL 0.36-1.02                 



             = 742-7)                                            

 

             EOS x10^3 (test code = 0.23 10*3/uL 0.06-0.53                 



             711-2)                                              

 

             BASO x10^3 (test code 0.04 10*3/uL 0.01-0.09                 



             = 704-7)                                            

 

             Lab Interpretation Abnormal                               



             (test code = 84754-3)                                        



[facility] METABOLIC PANEL (NA, K, CL, CO2, 
GLUCOSE, BUN, CREATININE, CA)2022 09:48:58





             Test Item    Value        Reference Range Interpretation Comments

 

             NA (test code = 135 mmol/L   135-145                   



             [id number])                                         

 

             K (test code = 4.7 mmol/L   3.5-5.0                   



             1335993424)                                         

 

             CL (test code = 101 mmol/L                       



             4646362095)                                         

 

             CO2 TOTAL (test code = 23 mmol/L    23-31                     



             7933357461)                                         

 

             AGAP (test code =              2-16                      



             7483592943)                                         

 

             BUN (test code = 18 mg/dL     7-23                      



             8144842106)                                         

 

             GLUCOSE (test code = 346 mg/dL           H            



             9264282864)                                         

 

             CREATININE (test code = 0.64 mg/dL   0.60-1.25                 



             3636384394)                                         

 

             CALCIUM (test code = 9.1 mg/dL    8.6-10.6                  



             3705275158)                                         

 

             eGFR (test code =              mL/min/1.73m2              



             7690016951)                                         

 

             MAL (test code = MAL) Association of                           



                          Glomerular Filtration                           



                          Rate (GFR) and Staging                           



                          of Kidney Disease*                           



                          +---------------------                           



                          --+-------------------                           



                          --+-------------------                           



                          ------+| GFR                           



                          (mL/min/1.73 m2) ?|                           



                          With Kidney Damage ?|                           



                          ?Without Kidney                           



                          Damage+---------------                           



                          --------+-------------                           



                          --------+-------------                           



                          ------------+| ?>90 ?                           



                          ? ? ? ? ? ? ? ?|                           



                          ?Stage one ? ? ? ? ?|                           



                          ? Normal ? ? ? ? ? ? ?                           



                          ?+--------------------                           



                          ---+------------------                           



                          ---+------------------                           



                          -------+| ?60-89 ? ? ?                           



                          ? ? ? ? ?| ?Stage two                           



                          ? ? ? ? ?| ? Decreased                           



                          GFR ? ? ? ?                            



                          +---------------------                           



                          --+-------------------                           



                          --+-------------------                           



                          ------+| ?30-59 ? ? ?                           



                          ? ? ? ? ?| ?Stage                           



                          three ? ? ? ?| ? Stage                           



                          three ? ? ? ? ?                           



                          +---------------------                           



                          --+-------------------                           



                          --+-------------------                           



                          ------+| ?15-29 ? ? ?                           



                          ? ? ? ? ?| ?Stage four                           



                          ? ? ? ? | ? Stage four                           



                          ? ? ? ? ?                              



                          ?+--------------------                           



                          ---+------------------                           



                          ---+------------------                           



                          -------+| ?<15 (or                           



                          dialysis) ? ?| ?Stage                           



                          five ? ? ? ? | ? Stage                           



                          five ? ? ? ? ?                           



                          ?+--------------------                           



                          ---+------------------                           



                          ---+------------------                           



                          -------+ *Each stage                           



                          assumes the associated                           



                          GFR level has been in                           



                          effect for at least                           



                          three months. ?Stages                           



                          1 to 5, with or                           



                          without kidney                           



                          disease, indicate                           



                          chronic kidney                           



                          disease. Notes:                           



                          Determination of                           



                          stages one and two                           



                          (with eGFR                             



                          >59mL/min/1.73 m2)                           



                          requires estimation of                           



                          kidney damage for at                           



                          least three months as                           



                          defined by structural                           



                          or functional                           



                          abnormalities of the                           



                          kidney, manifested by                           



                          either:Pathological                           



                          abnormalities or                           



                          Markers of kidney                           



                          damage (including                           



                          abnormalities in the                           



                          composition of the                           



                          blood or urine or                           



                          abnormalities in                           



                          imaging tests).                           

 

             Lab Interpretation Abnormal                               



             (test code = 76586-1)                                        



[facility] WITH [id number] 09:06:55





             Test Item    Value        Reference Range Interpretation Comments

 

             WBC (test code =              See_Comment                [Automated



             7739-2)                                             message] The sy

stem



                                                                 which generated



                                                                 this result



                                                                 transmitted



                                                                 reference range

:



                                                                 4.20 - 10.70



                                                                 10*3/?L. The



                                                                 reference range

 was



                                                                 not used to



                                                                 interpret this



                                                                 result as



                                                                 normal/abnormal

.

 

             RBC (test code =              See_Comment                [Automated



             419-8)                                              message] The sy

stem



                                                                 which generated



                                                                 this result



                                                                 transmitted



                                                                 reference range

:



                                                                 4.26 - 5.52



                                                                 10*6/?L. The



                                                                 reference range

 was



                                                                 not used to



                                                                 interpret this



                                                                 result as



                                                                 normal/abnormal

.

 

             HGB (test code = 15.0 g/dL    12.2-16.4                 



             718-7)                                              

 

             HCT (test code = 44.8 %       38.4-49.3                 



             4544-3)                                             

 

             MCV (test code = 88.2 fL      81.7-95.6                 



             787-2)                                              

 

             MCH (test code = 29.5 pg      26.1-32.7                 



             785-6)                                              

 

             MCHC (test code = 33.5 g/dL    31.2-35.0                 



             786-4)                                              

 

             RDW-SD (test code = 47.7 fL      38.5-51.6                 



             26363-4)                                            

 

             RDW-CV (test code = 14.6 %       12.1-15.4                 



             788-0)                                              

 

             PLT (test code =              See_Comment                [Automated



             777-3)                                              message] The sy

stem



                                                                 which generated



                                                                 this result



                                                                 transmitted



                                                                 reference range

:



                                                                 150 - 328 10*3/

?L.



                                                                 The reference r

zhen



                                                                 was not used to



                                                                 interpret this



                                                                 result as



                                                                 normal/abnormal

.

 

             MPV (test code = 9.9 fL       9.8-13.0                  



             48903-1)                                            

 

             NRBC/100 WBC (test              See_Comment                [Automat

ed



             code = 4907649057)                                        message] 

The system



                                                                 which generated



                                                                 this result



                                                                 transmitted



                                                                 reference range

:



                                                                 0.0 - 10.0 /100



                                                                 WBCs. The refer

ence



                                                                 range was not u

sed



                                                                 to interpret th

is



                                                                 result as



                                                                 normal/abnormal

.

 

             NRBC x10^3 (test code <0.01        See_Comment                [Auto

mated



             = [id number])                                        message] The s

ystem



                                                                 which generated



                                                                 this result



                                                                 transmitted



                                                                 reference range

:



                                                                 10*3/?L. The



                                                                 reference range

 was



                                                                 not used to



                                                                 interpret this



                                                                 result as



                                                                 normal/abnormal

.

 

             GRAN MAT (NEUT) % 62.6 %                                 



             (test code = 770-8)                                        

 

             IMM GRAN % (test code 1.30 %                                 



             = 2347874691)                                        

 

             LYMPH % (test code = 23.2 %                                 



             736-9)                                              

 

             MONO % (test code = 9.6 %                                  



             5905-5)                                             

 

             EOS % (test code = 2.9 %                                  



             713-8)                                              

 

             BASO % (test code = 0.4 %                                  



             706-2)                                              

 

             GRAN MAT x10^3(ANC) 5.85 10*3/uL 1.99-6.95                 



             (test code =                                        



             6358867392)                                         

 

             IMM GRAN x10^3 (test 0.12 10*3/uL 0.00-0.06    H            



             code = 0052441698)                                        

 

             LYMPH x10^3 (test code 2.17 10*3/uL 1.09-3.23                 



             = 731-0)                                            

 

             MONO x10^3 (test code 0.90 10*3/uL 0.36-1.02                 



             = 742-7)                                            

 

             EOS x10^3 (test code = 0.27 10*3/uL 0.06-0.53                 



             711-2)                                              

 

             BASO x10^3 (test code 0.04 10*3/uL 0.01-0.09                 



             = 704-7)                                            

 

             Lab Interpretation Abnormal                               



             (test code = 90483-5)                                        



[id number]





             Test Item    Value        Reference Range Interpretation Comments

 

             Blood Culture-Aerobic No organisms No growth                 Previo

us



             (test code = 17928-3) isolated                               prelim

inary



                                                                 verified result



                                                                 was Culture In



                                                                 Progress on



                                                                 2022 at 00

01



                                                                 CDTPrevious



                                                                 preliminary



                                                                 verified result



                                                                 was No growth a

t



                                                                 24 hours on



                                                                 2022 at 21

01



                                                                 CDTPrevious



                                                                 preliminary



                                                                 verified result



                                                                 was No growth a

t



                                                                 48 hours on



                                                                 2022 at 



                                                                 CDTPrevious



                                                                 preliminary



                                                                 verified result



                                                                 was No growth a

t



                                                                 72 hours on



                                                                 2022 at 21

01



                                                                 CDT

 

             Blood        No organisms No growth                 Previous



             Culture-Anaerobic isolated                               preliminar

y



             (test code = 57958-3)                                        verifi

ed result



                                                                 was Culture In



                                                                 Progress on



                                                                 2022 at 00





                                                                 CDTPrevious



                                                                 preliminary



                                                                 verified result



                                                                 was No growth a

t



                                                                 24 hours on



                                                                 2022 at 21





                                                                 CDTPrevious



                                                                 preliminary



                                                                 verified result



                                                                 was No growth a

t



                                                                 48 hours on



                                                                 2022 at 



                                                                 CDTPrevious



                                                                 preliminary



                                                                 verified result



                                                                 was No growth a

t



                                                                 72 hours on



                                                                 2022 at 



                                                                 CDT

 

             Lab Interpretation Normal                                 



             (test code = 07284-9)                                        



[id number]:01:46





             Test Item    Value        Reference Range Interpretation Comments

 

             Blood Culture-Aerobic No organisms No growth                 Previo

us



             (test code = 17928-3) isolated                               prelim

inary



                                                                 verified result



                                                                 was Culture In



                                                                 Progress on



                                                                 2022 at 00





                                                                 CDTPrevious



                                                                 preliminary



                                                                 verified result



                                                                 was No growth a

t



                                                                 24 hours on



                                                                 2022 at 



                                                                 CDTPrevious



                                                                 preliminary



                                                                 verified result



                                                                 was No growth a

t



                                                                 48 hours on



                                                                 2022 at 



                                                                 CDTPrevious



                                                                 preliminary



                                                                 verified result



                                                                 was No growth a

t



                                                                 72 hours on



                                                                 2022 at 21

01



                                                                 CDT

 

             Blood        No organisms No growth                 Previous



             Culture-Anaerobic isolated                               preliminar

y



             (test code = 96396-5)                                        verifi

ed result



                                                                 was Culture In



                                                                 Progress on



                                                                 2022 at 00

01



                                                                 CDTPrevious



                                                                 preliminary



                                                                 verified result



                                                                 was No growth a

t



                                                                 24 hours on



                                                                 2022 at 



                                                                 CDTPrevious



                                                                 preliminary



                                                                 verified result



                                                                 was No growth a

t



                                                                 48 hours on



                                                                 2022 at 



                                                                 CDTPrevious



                                                                 preliminary



                                                                 verified result



                                                                 was No growth a

t



                                                                 72 hours on



                                                                 2022 at 21

01



                                                                 CDT

 

             Lab Interpretation Normal                                 



             (test code = 22299-0)                                        



[id number]49





             Test Item    Value        Reference Range Interpretation Comments

 

             NT-proBNP (test code 117 pg/mL    See_Comment                [Autom

ated



             = 3802264783)                                        message] The



                                                                 system which



                                                                 generated this



                                                                 result



                                                                 transmitted



                                                                 reference range

:



                                                                 <=125. The



                                                                 reference range



                                                                 was not used to



                                                                 interpret this



                                                                 result as



                                                                 normal/abnormal

.

 

             MAL (test code = MAL) Biotin has been                           



                          reported to                            



                          cause a negative                           



                          bias, interpret                           



                          results relative                           



                          to patient's use                           



                          of biotin.                             

 

             Lab Interpretation Normal                                 



             (test code = 76807-9)                                        



[facility]. METABOLIC PANEL ([id number] 
12:10:25





             Test Item    Value        Reference Range Interpretation Comments

 

             NA (test code = 137 mmol/L   135-145                   



             8499886350)                                         

 

             K (test code = 4.6 mmol/L   3.5-5.0                   



             1094261662)                                         

 

             CL (test code = 108 mmol/L                       



             5366885802)                                         

 

             CO2 TOTAL (test code = 20 mmol/L    23-31        L            



             4214599937)                                         

 

             AGAP (test code =              2-16                      



             7074773677)                                         

 

             BUN (test code = 12 mg/dL     7-23                      



             4906760492)                                         

 

             GLUCOSE (test code = 186 mg/dL           H            



             8550540495)                                         

 

             CREATININE (test code = 0.49 mg/dL   0.60-1.25    L            



             7493986941)                                         

 

             TOTAL BILI (test code = 0.7 mg/dL    0.1-1.1                   



             4992460713)                                         

 

             CALCIUM (test code = 8.7 mg/dL    8.6-10.6                  



             4675967917)                                         

 

             T PROTEIN (test code = 6.9 g/dL     6.3-8.2                   



             4139593511)                                         

 

             ALBUMIN (test code = 3.7 g/dL     3.5-5.0                   



             4033085112)                                         

 

             ALK PHOS (test code = 114 U/L                          



             3357069462)                                         

 

             ALTv (test code = 75 U/L       5-50         H            



             1742-6)                                             

 

             AST(SGOT) (test code = 27 U/L       13-40                     



             3782703167)                                         

 

             eGFR (test code =              mL/min/1.73m2              



             1610115075)                                         

 

             MAL (test code = MAL) Association of                           



                          Glomerular Filtration                           



                          Rate (GFR) and Staging                           



                          of Kidney Disease*                           



                          +---------------------                           



                          --+-------------------                           



                          --+-------------------                           



                          ------+| GFR                           



                          (mL/min/1.73 m2) ?|                           



                          With Kidney Damage ?|                           



                          ?Without Kidney                           



                          Damage+---------------                           



                          --------+-------------                           



                          --------+-------------                           



                          ------------+| ?>90 ?                           



                          ? ? ? ? ? ? ? ?|                           



                          ?Stage one ? ? ? ? ?|                           



                          ? Normal ? ? ? ? ? ? ?                           



                          ?+--------------------                           



                          ---+------------------                           



                          ---+------------------                           



                          -------+| ?60-89 ? ? ?                           



                          ? ? ? ? ?| ?Stage two                           



                          ? ? ? ? ?| ? Decreased                           



                          GFR ? ? ? ?                            



                          +---------------------                           



                          --+-------------------                           



                          --+-------------------                           



                          ------+| ?30-59 ? ? ?                           



                          ? ? ? ? ?| ?Stage                           



                          three ? ? ? ?| ? Stage                           



                          three ? ? ? ? ?                           



                          +---------------------                           



                          --+-------------------                           



                          --+-------------------                           



                          ------+| ?15-29 ? ? ?                           



                          ? ? ? ? ?| ?Stage four                           



                          ? ? ? ? | ? Stage four                           



                          ? ? ? ? ?                              



                          ?+--------------------                           



                          ---+------------------                           



                          ---+------------------                           



                          -------+| ?<15 (or                           



                          dialysis) ? ?| ?Stage                           



                          five ? ? ? ? | ? Stage                           



                          five ? ? ? ? ?                           



                          ?+--------------------                           



                          ---+------------------                           



                          ---+------------------                           



                          -------+ *Each stage                           



                          assumes the associated                           



                          GFR level has been in                           



                          effect for at least                           



                          three months. ?Stages                           



                          1 to 5, with or                           



                          without kidney                           



                          disease, indicate                           



                          chronic kidney                           



                          disease. Notes:                           



                          Determination of                           



                          stages one and two                           



                          (with eGFR                             



                          >59mL/min/1.73 m2)                           



                          requires estimation of                           



                          kidney damage for at                           



                          least three months as                           



                          defined by structural                           



                          or functional                           



                          abnormalities of the                           



                          kidney, manifested by                           



                          either:Pathological                           



                          abnormalities or                           



                          Markers of kidney                           



                          damage (including                           



                          abnormalities in the                           



                          composition of the                           



                          blood or urine or                           



                          abnormalities in                           



                          imaging tests).                           

 

             Lab Interpretation Abnormal                               



             (test code = 94407-6)                                        



[facility] WITH [id number] 11:14:25





             Test Item    Value        Reference Range Interpretation Comments

 

             WBC (test code =              See_Comment                [Automated



             2625-2)                                             message] The sy

stem



                                                                 which generated



                                                                 this result



                                                                 transmitted



                                                                 reference range

:



                                                                 4.20 - 10.70



                                                                 10*3/?L. The



                                                                 reference range

 was



                                                                 not used to



                                                                 interpret this



                                                                 result as



                                                                 normal/abnormal

.

 

             RBC (test code =              See_Comment                [Automated



             597-8)                                              message] The sy

stem



                                                                 which generated



                                                                 this result



                                                                 transmitted



                                                                 reference range

:



                                                                 4.26 - 5.52



                                                                 10*6/?L. The



                                                                 reference range

 was



                                                                 not used to



                                                                 interpret this



                                                                 result as



                                                                 normal/abnormal

.

 

             HGB (test code = 14.8 g/dL    12.2-16.4                 



             718-7)                                              

 

             HCT (test code = 44.8 %       38.4-49.3                 



             4544-3)                                             

 

             MCV (test code = 89.1 fL      81.7-95.6                 



             787-2)                                              

 

             MCH (test code = 29.4 pg      26.1-32.7                 



             785-6)                                              

 

             MCHC (test code = 33.0 g/dL    31.2-35.0                 



             786-4)                                              

 

             RDW-SD (test code = 48.7 fL      38.5-51.6                 



             01293-7)                                            

 

             RDW-CV (test code = 15.0 %       12.1-15.4                 



             788-0)                                              

 

             PLT (test code =              See_Comment                [Automated



             777-3)                                              message] The sy

stem



                                                                 which generated



                                                                 this result



                                                                 transmitted



                                                                 reference range

:



                                                                 150 - 328 10*3/

?L.



                                                                 The reference r

zhen



                                                                 was not used to



                                                                 interpret this



                                                                 result as



                                                                 normal/abnormal

.

 

             MPV (test code = 10.4 fL      9.8-13.0                  



             41668-4)                                            

 

             NRBC/100 WBC (test              See_Comment                [Automat

ed



             code = 0112034735)                                        message] 

The system



                                                                 which generated



                                                                 this result



                                                                 transmitted



                                                                 reference range

:



                                                                 0.0 - 10.0 /100



                                                                 WBCs. The refer

ence



                                                                 range was not u

sed



                                                                 to interpret th

is



                                                                 result as



                                                                 normal/abnormal

.

 

             NRBC x10^3 (test code <0.01        See_Comment                [Auto

mated



             = [id number])                                        message] The s

ystem



                                                                 which generated



                                                                 this result



                                                                 transmitted



                                                                 reference range

:



                                                                 10*3/?L. The



                                                                 reference range

 was



                                                                 not used to



                                                                 interpret this



                                                                 result as



                                                                 normal/abnormal

.

 

             GRAN MAT (NEUT) % 65.5 %                                 



             (test code = 770-8)                                        

 

             IMM GRAN % (test code 0.80 %                                 



             = 3635431659)                                        

 

             LYMPH % (test code = 20.9 %                                 



             736-9)                                              

 

             MONO % (test code = 9.7 %                                  



             5905-5)                                             

 

             EOS % (test code = 2.7 %                                  



             713-8)                                              

 

             BASO % (test code = 0.4 %                                  



             706-2)                                              

 

             GRAN MAT x10^3(ANC) 5.41 10*3/uL 1.99-6.95                 



             (test code =                                        



             6613097508)                                         

 

             IMM GRAN x10^3 (test 0.07 10*3/uL 0.00-0.06    H            



             code = 6253637193)                                        

 

             LYMPH x10^3 (test code 1.73 10*3/uL 1.09-3.23                 



             = 731-0)                                            

 

             MONO x10^3 (test code 0.80 10*3/uL 0.36-1.02                 



             = 742-7)                                            

 

             EOS x10^3 (test code = 0.22 10*3/uL 0.06-0.53                 



             711-2)                                              

 

             BASO x10^3 (test code 0.03 10*3/uL 0.01-0.09                 



             = 704-7)                                            

 

             Lab Interpretation Abnormal                               



             (test code = 98512-9)                                        



[id number]





             Test Item    Value        Reference Range Interpretation Comments

 

             NT-proBNP (test code 157 pg/mL    See_Comment  H             [Autom

ated



             = [id number])                                        message] The



                                                                 system which



                                                                 generated this



                                                                 result



                                                                 transmitted



                                                                 reference range

:



                                                                 <=125. The



                                                                 reference range



                                                                 was not used to



                                                                 interpret this



                                                                 result as



                                                                 normal/abnormal

.

 

             MAL (test code = MAL) Biotin has been                           



                          reported to                            



                          cause a negative                           



                          bias, interpret                           



                          results relative                           



                          to patient's use                           



                          of biotin.                             

 

             Lab Interpretation Abnormal                               



             (test code = 78869-2)                                        



[id number] 
[facility]:08:45





             Test Item    Value        Reference Range Interpretation Comments

 

             NA (test code = 141 mmol/L   135-145                   



             3297585090)                                         

 

             K (test code = 4.2 mmol/L   3.5-5.0                   



             7041924487)                                         

 

             CL (test code = 110 mmol/L          H            



             7443442464)                                         

 

             CO2 TOTAL (test code = 24 mmol/L    23-31                     



             5173190543)                                         

 

             AGAP (test code =              2-16                      



             2302520872)                                         

 

             BUN (test code = 11 mg/dL     7-23                      



             3945714478)                                         

 

             GLUCOSE (test code = 142 mg/dL           H            



             5398568538)                                         

 

             CREATININE (test code = 0.61 mg/dL   0.60-1.25                 



             5662990578)                                         

 

             TOTAL BILI (test code = 0.7 mg/dL    0.1-1.1                   



             9554758570)                                         

 

             CALCIUM (test code = 8.5 mg/dL    8.6-10.6     L            



             6837438998)                                         

 

             T PROTEIN (test code = 6.7 g/dL     6.3-8.2                   



             4620671029)                                         

 

             ALBUMIN (test code = 3.6 g/dL     3.5-5.0                   



             9148401209)                                         

 

             ALK PHOS (test code = 120 U/L                          



             4276412938)                                         

 

             ALTv (test code = 88 U/L       5-50         H            



             1742-6)                                             

 

             AST(SGOT) (test code = 27 U/L       13-40                     



             0646691379)                                         

 

             eGFR (test code =              mL/min/1.73m2              



             7364321503)                                         

 

             MAL (test code = MAL) Association of                           



                          Glomerular Filtration                           



                          Rate (GFR) and Staging                           



                          of Kidney Disease*                           



                          +---------------------                           



                          --+-------------------                           



                          --+-------------------                           



                          ------+| GFR                           



                          (mL/min/1.73 m2) ?|                           



                          With Kidney Damage ?|                           



                          ?Without Kidney                           



                          Damage+---------------                           



                          --------+-------------                           



                          --------+-------------                           



                          ------------+| ?>90 ?                           



                          ? ? ? ? ? ? ? ?|                           



                          ?Stage one ? ? ? ? ?|                           



                          ? Normal ? ? ? ? ? ? ?                           



                          ?+--------------------                           



                          ---+------------------                           



                          ---+------------------                           



                          -------+| ?60-89 ? ? ?                           



                          ? ? ? ? ?| ?Stage two                           



                          ? ? ? ? ?| ? Decreased                           



                          GFR ? ? ? ?                            



                          +---------------------                           



                          --+-------------------                           



                          --+-------------------                           



                          ------+| ?30-59 ? ? ?                           



                          ? ? ? ? ?| ?Stage                           



                          three ? ? ? ?| ? Stage                           



                          three ? ? ? ? ?                           



                          +---------------------                           



                          --+-------------------                           



                          --+-------------------                           



                          ------+| ?15-29 ? ? ?                           



                          ? ? ? ? ?| ?Stage four                           



                          ? ? ? ? | ? Stage four                           



                          ? ? ? ? ?                              



                          ?+--------------------                           



                          ---+------------------                           



                          ---+------------------                           



                          -------+| ?<15 (or                           



                          dialysis) ? ?| ?Stage                           



                          five ? ? ? ? | ? Stage                           



                          five ? ? ? ? ?                           



                          ?+--------------------                           



                          ---+------------------                           



                          ---+------------------                           



                          -------+ *Each stage                           



                          assumes the associated                           



                          GFR level has been in                           



                          effect for at least                           



                          three months. ?Stages                           



                          1 to 5, with or                           



                          without kidney                           



                          disease, indicate                           



                          chronic kidney                           



                          disease. Notes:                           



                          Determination of                           



                          stages one and two                           



                          (with eGFR                             



                          >59mL/min/1.73 m2)                           



                          requires estimation of                           



                          kidney damage for at                           



                          least three months as                           



                          defined by structural                           



                          or functional                           



                          abnormalities of the                           



                          kidney, manifested by                           



                          either:Pathological                           



                          abnormalities or                           



                          Markers of kidney                           



                          damage (including                           



                          abnormalities in the                           



                          composition of the                           



                          blood or urine or                           



                          abnormalities in                           



                          imaging tests).                           

 

             Lab Interpretation Abnormal                               



             (test code = 89676-2)                                        



[facility]





             Test Item    Value        Reference Range Interpretation Comments

 

             MAGNESIUM (test code = 5624228305) 1.7 mg/dL    1.7-2.4            

       

 

             Lab Interpretation (test code = Normal                             

    



             09179-9)                                            



[facility] WITH [id number] 11:57:56





             Test Item    Value        Reference Range Interpretation Comments

 

             WBC (test code =              See_Comment                [Automated



             6290-2)                                             message] The sy

stem



                                                                 which generated



                                                                 this result



                                                                 transmitted



                                                                 reference range

:



                                                                 4.20 - 10.70



                                                                 10*3/?L. The



                                                                 reference range

 was



                                                                 not used to



                                                                 interpret this



                                                                 result as



                                                                 normal/abnormal

.

 

             RBC (test code =              See_Comment                [Automated



             789-8)                                              message] The sy

stem



                                                                 which generated



                                                                 this result



                                                                 transmitted



                                                                 reference range

:



                                                                 4.26 - 5.52



                                                                 10*6/?L. The



                                                                 reference range

 was



                                                                 not used to



                                                                 interpret this



                                                                 result as



                                                                 normal/abnormal

.

 

             HGB (test code = 14.6 g/dL    12.2-16.4                 



             718-7)                                              

 

             HCT (test code = 43.5 %       38.4-49.3                 



             4544-3)                                             

 

             MCV (test code = 88.4 fL      81.7-95.6                 



             787-2)                                              

 

             MCH (test code = 29.7 pg      26.1-32.7                 



             785-6)                                              

 

             MCHC (test code = 33.6 g/dL    31.2-35.0                 



             786-4)                                              

 

             RDW-SD (test code = 48.9 fL      38.5-51.6                 



             00631-9)                                            

 

             RDW-CV (test code = 14.9 %       12.1-15.4                 



             788-0)                                              

 

             PLT (test code =              See_Comment                [Automated



             777-3)                                              message] The sy

stem



                                                                 which generated



                                                                 this result



                                                                 transmitted



                                                                 reference range

:



                                                                 150 - 328 10*3/

?L.



                                                                 The reference r

zhen



                                                                 was not used to



                                                                 interpret this



                                                                 result as



                                                                 normal/abnormal

.

 

             MPV (test code = 9.4 fL       9.8-13.0     L            



             80405-0)                                            

 

             NRBC/100 WBC (test              See_Comment                [Automat

ed



             code = 4814485441)                                        message] 

The system



                                                                 which generated



                                                                 this result



                                                                 transmitted



                                                                 reference range

:



                                                                 0.0 - 10.0 /100



                                                                 WBCs. The refer

ence



                                                                 range was not u

sed



                                                                 to interpret th

is



                                                                 result as



                                                                 normal/abnormal

.

 

             NRBC x10^3 (test code <0.01        See_Comment                [Auto

mated



             = [id number])                                        message] The s

ystem



                                                                 which generated



                                                                 this result



                                                                 transmitted



                                                                 reference range

:



                                                                 10*3/?L. The



                                                                 reference range

 was



                                                                 not used to



                                                                 interpret this



                                                                 result as



                                                                 normal/abnormal

.

 

             GRAN MAT (NEUT) % 63.9 %                                 



             (test code = 770-8)                                        

 

             IMM GRAN % (test code 0.70 %                                 



             = [id number])                                        

 

             LYMPH % (test code = 22.7 %                                 



             736-9)                                              

 

             MONO % (test code = 9.6 %                                  



             5905-5)                                             

 

             EOS % (test code = 2.7 %                                  



             713-8)                                              

 

             BASO % (test code = 0.4 %                                  



             706-2)                                              

 

             GRAN MAT x10^3(ANC) 4.75 10*3/uL 1.99-6.95                 



             (test code =                                        



             [id number])                                         

 

             IMM GRAN x10^3 (test 0.05 10*3/uL 0.00-0.06                 



             code = 3993578016)                                        

 

             LYMPH x10^3 (test code 1.69 10*3/uL 1.09-3.23                 



             = 731-0)                                            

 

             MONO x10^3 (test code 0.71 10*3/uL 0.36-1.02                 



             = 742-7)                                            

 

             EOS x10^3 (test code = 0.20 10*3/uL 0.06-0.53                 



             711-2)                                              

 

             BASO x10^3 (test code 0.03 10*3/uL 0.01-0.09                 



             = 704-7)                                            

 

             Lab Interpretation Abnormal                               



             (test code = 23483-7)                                        



[id number]





             Test Item    Value        Reference Range Interpretation Comments

 

             VIT B12 (test code = 249 pg/mL    240-930                   



             [id number])                                         

 

             MAL (test code = MAL) Biotin has been                           



                          reported to cause a                           



                          positive bias,                           



                          interpret results                           



                          relative to                            



                          patient's use of                           



                          biotin.                                

 

             Lab Interpretation (test Normal                                 



             code = 30155-1)                                        



[id number]18:39





             Test Item    Value        Reference Range Interpretation Comments

 

             VIT B12 (test code = 249 pg/mL    240-930                   



             [id number])                                         

 

             MAL (test code = MAL) Biotin has been                           



                          reported to cause a                           



                          positive bias,                           



                          interpret results                           



                          relative to                            



                          patient's use of                           



                          biotin.                                

 

             Lab Interpretation (test Normal                                 



             code = 10495-7)                                        



[id number] 17:30:28





             Test Item    Value        Reference Range Interpretation Comments

 

             VIT D 25OH (test code = 16 ng/mL     25-80        L            



             22555-6)                                            

 

             MAL (test code = MAL) Deficiency: <20                           



                          ng/mLInsufficiency:                           



                          20-24 ng/mLOptimal:                           



                          25-80 ng/mL                            

 

             Lab Interpretation (test Abnormal                               



             code = 20523-3)                                        



[id number] 17:30:28





             Test Item    Value        Reference Range Interpretation Comments

 

             VIT D 25OH (test code = 16 ng/mL     25-80        L            



             13340-4)                                            

 

             MAL (test code = MAL) Deficiency: <20                           



                          ng/mLInsufficiency:                           



                          20-24 ng/mLOptimal:                           



                          25-80 ng/mL                            

 

             Lab Interpretation (test Abnormal                               



             code = 16789-2)                                        



[id number]





             Test Item    Value        Reference    Interpretation Comments



                                       Range                     

 

             Procalcitonin (test 0.04 ng/mL   See_Comment                [Automa

huma



             code = [id number])                                        message] 

The



                                                                 system which



                                                                 generated this



                                                                 result



                                                                 transmitted



                                                                 reference



                                                                 range: <=0.07.



                                                                 The reference



                                                                 range was not



                                                                 used to



                                                                 interpret this



                                                                 result as



                                                                 normal/abnormal



                                                                 .

 

             MAL (test code = INTERPRETATION OF                           



             MAL)         PROCALCITONIN RESULTS                           



                          IN ADULTS >= 18 YEARS                           



                          OF AGE Initiation and                           



                          discontinuation of                           



                          antibiotics on                           



                          patients with                           



                          suspected or confirmed                           



                          Lower Respiratory                           



                          Tract Infection in                           



                          Adults >= 18 years of                           



                          age.                                   



                          +--------------+------                           



                          ----------+-----------                           



                          ----+-----------------                           



                          -----------+|Procalcit                           



                          onin |Interpretation                           



                          ?|Antibiotic ? ?                           



                          |Considerations ? ? ?                           



                          ? ? ? ? |ng/mL ? ? ? ?                           



                          | ? ? ? ? ? ? ?                           



                          ?|recommendation | ? ?                           



                          ? ? ? ? ? ? ? ? ? ? ?                           



                          ?                                      



                          +--------------+------                           



                          ----------+-----------                           



                          ----+-----------------                           



                          -----------+| <0.1 ? ?                           



                          ? ? | Bacterial ? ? ?|                           



                          Strongly ? ? ?| ? ? ?                           



                          ? ? ? ? ? ? ? ? ? ? ?                           



                          | ? ? ? ? ? ? ?|                           



                          infection very |                           



                          discouraged ? |                           



                          Overruling: ? ? ? ? ?                           



                          ? ? ? | ? ? ? ? ? ? ?|                           



                          unlikely ? ? ? | ? ? ?                           



                          ? ? ? ? | ? Clinically                           



                          unstable ? ? ?                           



                          +--------------+------                           



                          ----------+-----------                           



                          ----+ ? High risk for                           



                          adverse ? ? | <0.25 ?                           



                          ? ? ?| Bacterial ? ?                           



                          ?| Discouraged ? | ?                           



                          outcome ? ? ? ? ? ? ?                           



                          ? ? | ? ? ? ? ? ? ?|                           



                          infection ? ? ?| ? ? ?                           



                          ? ? ? ? | ? SEE                           



                          IMPORTANT NOTE ? ? ?                           



                          ?| ? ? ? ? ? ? ?|                           



                          unlikely ? ? ? | ? ? ?                           



                          ? ? ? ? | ? ? ? ? ? ?                           



                          ? ? ? ? ? ? ? ?                           



                          +--------------+------                           



                          ----------+-----------                           



                          ----+-----------------                           



                          -----------+| >=0.25 ?                           



                          ? ? | Bacterial ? ? ?|                           



                          Encouraged ? ?| ? ? ?                           



                          ? ? ? ? ? ? ? ? ? ? ?                           



                          | ? ? ? ? ? ? ?|                           



                          infection ? ? ?| ? ? ?                           



                          ? ? ? ? | ? ? ? ? ? ?                           



                          ? ? ? ? ? ? ? ? | ? ?                           



                          ? ? ? ? ?| likely ? ?                           



                          ? ? | ? ? ? ? ? ? ? |                           



                          Consider treatment                           



                          failure                                



                          ?+--------------+-----                           



                          -----------+----------                           



                          -----+ if levels does                           



                          not decrease | >0.5 ?                           



                          ? ? ? | Bacterial ? ?                           



                          ?| Strongly ? ? ?|                           



                          appropriately ? ? ? ?                           



                          ? ? ? | ? ? ? ? ? ? ?|                           



                          infection very |                           



                          encouraged ? ?| ? ? ?                           



                          ? ? ? ? ? ? ? ? ? ? ?                           



                          | ? ? ? ? ? ? ?|                           



                          likely ? ? ? ? | ? ? ?                           



                          ? ? ? ? | ? ? ? ? ? ?                           



                          ? ? ? ? ? ? ? ?                           



                          +--------------+------                           



                          ----------+-----------                           



                          ----+-----------------                           



                          -----------+                           



                          Discontinuation of                           



                          antibiotics in                           



                          high-acuity patients                           



                          with suspected or                           



                          confirmed sepsis in                           



                          Adults >= 18 years of                           



                          age.                                   



                          +--------------+------                           



                          ----------+-----------                           



                          ----+-----------------                           



                          -----------+|Procalcit                           



                          onin |Interpretation                           



                          ?|Antibiotic ? ?                           



                          |Considerations ? ? ?                           



                          ? ? ? ? |ng/mL ? ? ? ?                           



                          | ? ? ? ? ? ? ?                           



                          ?|recommendation | ? ?                           



                          ? ? ? ? ? ? ? ? ? ? ?                           



                          ?                                      



                          +--------------+------                           



                          ----------+-----------                           



                          ----+-----------------                           



                          -----------+| <0.25 ?                           



                          ? ? ?| Bacterial ? ?                           



                          ?| Strongly ? ? ?| ? ?                           



                          ? ? ? ? ? ? ? ? ? ? ?                           



                          ? | ? ? ? ? ? ? ?|                           



                          infection very |                           



                          discouraged ? |                           



                          Overruling: ? ? ? ? ?                           



                          ? ? ? | ? ? ? ? ? ? ?|                           



                          unlikely ? ? ? | ? ? ?                           



                          ? ? ? ? | ? Clinically                           



                          unstable ? ? ?                           



                          +--------------+------                           



                          ----------+-----------                           



                          ----+ ? High risk for                           



                          adverse ? ? | <0.5 or                           



                          drop | Bacterial ? ?                           



                          ?| Discouraged ? | ?                           



                          outcome ? ? ? ? ? ? ?                           



                          ? ? | >80% from ? ?|                           



                          infection ? ? ?| ? ? ?                           



                          ? ? ? ? | ? SEE                           



                          IMPORTANT NOTE ? ? ?                           



                          ?| highest PCT ?|                           



                          unlikely ? ? ? | ? ? ?                           



                          ? ? ? ? | ? ? ? ? ? ?                           



                          ? ? ? ? ? ? ? ? |                           



                          level ? ? ? ?| ? ? ? ?                           



                          ? ? ? ?| ? ? ? ? ? ? ?                           



                          | ? ? ? ? ? ? ? ? ? ?                           



                          ? ? ? ?                                



                          +--------------+------                           



                          ----------+-----------                           



                          ----+-----------------                           



                          -----------+| >=0.5 ?                           



                          ? ? ?| Bacterial ? ?                           



                          ?| Encouraged ? ?| ? ?                           



                          ? ? ? ? ? ? ? ? ? ? ?                           



                          ? | ? ? ? ? ? ? ?|                           



                          infection ? ? ?| ? ? ?                           



                          ? ? ? ? | ? ? ? ? ? ?                           



                          ? ? ? ? ? ? ? ? | ? ?                           



                          ? ? ? ? ?| likely ? ?                           



                          ? ? | ? ? ? ? ? ? ? |                           



                          Consider treatment                           



                          failure                                



                          ?+--------------+-----                           



                          -----------+----------                           



                          -----+ if levels does                           



                          not decrease | >1.0 ?                           



                          ? ? ? | Bacterial ? ?                           



                          ?| Strongly ? ? ?|                           



                          appropriately ? ? ? ?                           



                          ? ? ? | ? ? ? ? ? ? ?|                           



                          infection very |                           



                          encouraged ? ?| ? ? ?                           



                          ? ? ? ? ? ? ? ? ? ? ?                           



                          | ? ? ? ? ? ? ?|                           



                          likely ? ? ? ? | ? ? ?                           



                          ? ? ? ? | ? ? ? ? ? ?                           



                          ? ? ? ? ? ? ? ?                           



                          +--------------+------                           



                          ----------+-----------                           



                          ----+-----------------                           



                          -----------+                           



                          Percentage of drop of                           



                          Procalcitonin                           



                          calculation for                           



                          Discontinuation of                           



                          antibiotics in                           



                          high-acuity patients                           



                          with suspected or                           



                          confirmed sepsis in                           



                          Adults >= 18 years of                           



                          age. ? ? ? ? ? ? ? ? ?                           



                          ? ? Procalcitonin                           



                          highest{}-Procalcitoni                           



                          n current{}Delta                           



                          Procalcitonin =                           



                          ______________________                           



                          ______________________                           



                          ___ x100% ? ? ? ? ? ?                           



                          ? ? ? ? ? ? ? ? ? ?                           



                          Procalcitonin current                           



                          {} IMPORTANT NOTE:                           



                          Procalcitonin may be                           



                          elevated without                           



                          bacterial infection by                           



                          physiologic stress                           



                          related to trauma,                           



                          burns, chronic                           



                          dialysis, metastatic                           



                          cancer, surgery in the                           



                          past seven days,                           



                          malaria, some fungal                           



                          infections, and some                           



                          forms of vasculitis.                           



                          The interpretation                           



                          algorithm may not                           



                          apply to patients with                           



                          immunosuppression                           



                          (equivalent of >10 mg                           



                          of prednisone daily),                           



                          HIV with CD4 cell                           



                          count < 350 cells/mm3,                           



                          active malignancy on                           



                          systemic chemotherapy,                           



                          solid organ transplant                           



                          or hematopoietic stem                           



                          cell transplantation,                           



                          or hospital acquired                           



                          pneumonia.                             



                          Additionally, some                           



                          clinical trials of                           



                          procalcitonin have                           



                          excluded patients with                           



                          shock requiring                           



                          vasopressor use, acute                           



                          respiratory failure                           



                          requiring mechanical                           



                          ventilation, or those                           



                          with known lung                           



                          abscess/empyema. For                           



                          further information                           



                          please refer                           



                          to:[email or web address]                           



                          [email or web address]                           



                          p                                      

 

             Lab Interpretation Normal                                 



             (test code =                                        



             56584-8)                                            



[facility]





             Test Item    Value        Reference Range Interpretation Comments

 

             Procalcitonin (test 0.04 ng/mL   <0.07                     



             code = 4447468021)                                        

 

             MAL (test code = MAL) INTERPRETATION OF                           



                          PROCALCITONIN RESULTS IN                           



                          ADULTS >= 18 YEARS OF                           



                          AGE Initiation and                           



                          discontinuation of                           



                          antibiotics on patients                           



                          with suspected or                           



                          confirmed Lower                           



                          Respiratory Tract                           



                          Infection in Adults >=                           



                          18 years of age.                           



                          +--------------+--------                           



                          --------+---------------                           



                          +-----------------------                           



                          -----+|Procalcitonin                           



                          |Interpretation                           



                          ?|Antibiotic ? ?                           



                          |Considerations ? ? ? ?                           



                          ? ? ? |ng/mL ? ? ? ? | ?                           



                          ? ? ? ? ? ?                            



                          ?|recommendation | ? ? ?                           



                          ? ? ? ? ? ? ? ? ? ? ?                           



                          +--------------+--------                           



                          --------+---------------                           



                          +-----------------------                           



                          -----+| <0.1 ? ? ? ? |                           



                          Bacterial ? ? ?|                           



                          Strongly ? ? ?| ? ? ? ?                           



                          ? ? ? ? ? ? ? ? ? ? | ?                           



                          ? ? ? ? ? ?| infection                           



                          very | discouraged ? |                           



                          Overruling: ? ? ? ? ? ?                           



                          ? ? | ? ? ? ? ? ? ?|                           



                          unlikely ? ? ? | ? ? ? ?                           



                          ? ? ? | ? Clinically                           



                          unstable ? ? ?                           



                          +--------------+--------                           



                          --------+---------------                           



                          + ? High risk for                           



                          adverse ? ? | <0.25 ? ?                           



                          ? ?| Bacterial ? ? ?|                           



                          Discouraged ? | ?                           



                          outcome ? ? ? ? ? ? ? ?                           



                          ? | ? ? ? ? ? ? ?|                           



                          infection ? ? ?| ? ? ? ?                           



                          ? ? ? | ? SEE IMPORTANT                           



                          NOTE ? ? ? ?| ? ? ? ? ?                           



                          ? ?| unlikely ? ? ? | ?                           



                          ? ? ? ? ? ? | ? ? ? ? ?                           



                          ? ? ? ? ? ? ? ? ?                           



                          +--------------+--------                           



                          --------+---------------                           



                          +-----------------------                           



                          -----+| >=0.25 ? ? ? |                           



                          Bacterial ? ? ?|                           



                          Encouraged ? ?| ? ? ? ?                           



                          ? ? ? ? ? ? ? ? ? ? | ?                           



                          ? ? ? ? ? ?| infection ?                           



                          ? ?| ? ? ? ? ? ? ? | ? ?                           



                          ? ? ? ? ? ? ? ? ? ? ? ?                           



                          | ? ? ? ? ? ? ?| likely                           



                          ? ? ? ? | ? ? ? ? ? ? ?                           



                          | Consider treatment                           



                          failure                                



                          ?+--------------+-------                           



                          ---------+--------------                           



                          -+ if levels does not                           



                          decrease | >0.5 ? ? ? ?                           



                          | Bacterial ? ? ?|                           



                          Strongly ? ? ?|                           



                          appropriately ? ? ? ? ?                           



                          ? ? | ? ? ? ? ? ? ?|                           



                          infection very |                           



                          encouraged ? ?| ? ? ? ?                           



                          ? ? ? ? ? ? ? ? ? ? | ?                           



                          ? ? ? ? ? ?| likely ? ?                           



                          ? ? | ? ? ? ? ? ? ? | ?                           



                          ? ? ? ? ? ? ? ? ? ? ? ?                           



                          ?                                      



                          +--------------+--------                           



                          --------+---------------                           



                          +-----------------------                           



                          -----+ Discontinuation                           



                          of antibiotics in                           



                          high-acuity patients                           



                          with suspected or                           



                          confirmed sepsis in                           



                          Adults >= 18 years of                           



                          age.                                   



                          +--------------+--------                           



                          --------+---------------                           



                          +-----------------------                           



                          -----+|Procalcitonin                           



                          |Interpretation                           



                          ?|Antibiotic ? ?                           



                          |Considerations ? ? ? ?                           



                          ? ? ? |ng/mL ? ? ? ? | ?                           



                          ? ? ? ? ? ?                            



                          ?|recommendation | ? ? ?                           



                          ? ? ? ? ? ? ? ? ? ? ?                           



                          +--------------+--------                           



                          --------+---------------                           



                          +-----------------------                           



                          -----+| <0.25 ? ? ? ?|                           



                          Bacterial ? ? ?|                           



                          Strongly ? ? ?| ? ? ? ?                           



                          ? ? ? ? ? ? ? ? ? ? | ?                           



                          ? ? ? ? ? ?| infection                           



                          very | discouraged ? |                           



                          Overruling: ? ? ? ? ? ?                           



                          ? ? | ? ? ? ? ? ? ?|                           



                          unlikely ? ? ? | ? ? ? ?                           



                          ? ? ? | ? Clinically                           



                          unstable ? ? ?                           



                          +--------------+--------                           



                          --------+---------------                           



                          + ? High risk for                           



                          adverse ? ? | <0.5 or                           



                          drop | Bacterial ? ? ?|                           



                          Discouraged ? | ?                           



                          outcome ? ? ? ? ? ? ? ?                           



                          ? | >80% from ? ?|                           



                          infection ? ? ?| ? ? ? ?                           



                          ? ? ? | ? SEE IMPORTANT                           



                          NOTE ? ? ? ?| highest                           



                          PCT ?| unlikely ? ? ? |                           



                          ? ? ? ? ? ? ? | ? ? ? ?                           



                          ? ? ? ? ? ? ? ? ? ? |                           



                          level ? ? ? ?| ? ? ? ? ?                           



                          ? ? ?| ? ? ? ? ? ? ? | ?                           



                          ? ? ? ? ? ? ? ? ? ? ? ?                           



                          ?                                      



                          +--------------+--------                           



                          --------+---------------                           



                          +-----------------------                           



                          -----+| >=0.5 ? ? ? ?|                           



                          Bacterial ? ? ?|                           



                          Encouraged ? ?| ? ? ? ?                           



                          ? ? ? ? ? ? ? ? ? ? | ?                           



                          ? ? ? ? ? ?| infection ?                           



                          ? ?| ? ? ? ? ? ? ? | ? ?                           



                          ? ? ? ? ? ? ? ? ? ? ? ?                           



                          | ? ? ? ? ? ? ?| likely                           



                          ? ? ? ? | ? ? ? ? ? ? ?                           



                          | Consider treatment                           



                          failure                                



                          ?+--------------+-------                           



                          ---------+--------------                           



                          -+ if levels does not                           



                          decrease | >1.0 ? ? ? ?                           



                          | Bacterial ? ? ?|                           



                          Strongly ? ? ?|                           



                          appropriately ? ? ? ? ?                           



                          ? ? | ? ? ? ? ? ? ?|                           



                          infection very |                           



                          encouraged ? ?| ? ? ? ?                           



                          ? ? ? ? ? ? ? ? ? ? | ?                           



                          ? ? ? ? ? ?| likely ? ?                           



                          ? ? | ? ? ? ? ? ? ? | ?                           



                          ? ? ? ? ? ? ? ? ? ? ? ?                           



                          ?                                      



                          +--------------+--------                           



                          --------+---------------                           



                          +-----------------------                           



                          -----+ Percentage of                           



                          drop of Procalcitonin                           



                          calculation for                           



                          Discontinuation of                           



                          antibiotics in                           



                          high-acuity patients                           



                          with suspected or                           



                          confirmed sepsis in                           



                          Adults >= 18 years of                           



                          age. ? ? ? ? ? ? ? ? ? ?                           



                          ? Procalcitonin                           



                          highest{}-Procalcitonin                           



                          current{}Delta                           



                          Procalcitonin =                           



                          ________________________                           



                          _______________________                           



                          x100% ? ? ? ? ? ? ? ? ?                           



                          ? ? ? ? ? ? ?                           



                          Procalcitonin current {}                           



                          IMPORTANT NOTE:                           



                          Procalcitonin may be                           



                          elevated without                           



                          bacterial infection by                           



                          physiologic stress                           



                          related to trauma,                           



                          burns, chronic dialysis,                           



                          metastatic cancer,                           



                          surgery in the past                           



                          seven days, malaria,                           



                          some fungal infections,                           



                          and some forms of                           



                          vasculitis. The                           



                          interpretation algorithm                           



                          may not apply to                           



                          patients with                           



                          immunosuppression                           



                          (equivalent of >10 mg of                           



                          prednisone daily), HIV                           



                          with CD4 cell count <                           



                          350 cells/mm3, active                           



                          malignancy on systemic                           



                          chemotherapy, solid                           



                          organ transplant or                           



                          hematopoietic stem cell                           



                          transplantation, or                           



                          hospital acquired                           



                          pneumonia. Additionally,                           



                          some clinical trials of                           



                          procalcitonin have                           



                          excluded patients with                           



                          shock requiring                           



                          vasopressor use, acute                           



                          respiratory failure                           



                          requiring mechanical                           



                          ventilation, or those                           



                          with known lung                           



                          abscess/empyema. For                           



                          further information                           



                          please refer                           



                          to:[email or web address]                           



                          [email or web address]                           

 

             Lab Interpretation Normal                                 



             (test code = 04954-7)                                        



[id number]21:10





             Test Item    Value        Reference Range Interpretation Comments

 

             ESR (test code =              See_Comment  H             [Automated

 message]



             [id number])                                         The system whic

h



                                                                 generated this 

result



                                                                 transmitted ref

erence



                                                                 range: 0 - 10 m

m/HR.



                                                                 The reference r

zhen



                                                                 was not used to



                                                                 interpret this 

result



                                                                 as normal/abnor

mal.

 

             Lab Interpretation (test Abnormal                               



             code = 77092-4)                                        



[facility] 
13:12:27





             Test Item    Value        Reference Range Interpretation Comments

 

             HGB A1C (test code = 6.3 %        4.0-5.7      H            



             4548-4)                                             

 

             MAL (test code = MAL) Reference                              



                          RangesNormal:                           



                          <5.7%Prediabetes:                           



                          5.7 - 6.4%Diabetes:                           



                          > 6.5%                                 

 

             Lab Interpretation (test Abnormal                               



             code = 75718-8)                                        



[id number] 11:53:14





             Test Item    Value        Reference Range Interpretation Comments

 

             NT-proBNP (test code 16 pg/mL     See_Comment                [Autom

ated



             = [id number])                                        message] The



                                                                 system which



                                                                 generated this



                                                                 result



                                                                 transmitted



                                                                 reference range

:



                                                                 <=125. The



                                                                 reference range



                                                                 was not used to



                                                                 interpret this



                                                                 result as



                                                                 normal/abnormal

.

 

             MAL (test code = MAL) Biotin has been                           



                          reported to                            



                          cause a negative                           



                          bias, interpret                           



                          results relative                           



                          to patient's use                           



                          of biotin.                             

 

             Lab Interpretation Normal                                 



             (test code = 67865-7)                                        



[id number] 
[facility]44:55





             Test Item    Value        Reference Range Interpretation Comments

 

             NA (test code = 142 mmol/L   135-145                   



             2335682816)                                         

 

             K (test code = 3.9 mmol/L   3.5-5.0                   



             5165056483)                                         

 

             CL (test code = 108 mmol/L                       



             4503809012)                                         

 

             CO2 TOTAL (test code = 24 mmol/L    23-31                     



             5201431038)                                         

 

             AGAP (test code =              2-16                      



             0937182862)                                         

 

             BUN (test code = 11 mg/dL     7-23                      



             8400396657)                                         

 

             GLUCOSE (test code = 205 mg/dL           H            



             6377920309)                                         

 

             CREATININE (test code = 0.71 mg/dL   0.60-1.25                 



             6552548661)                                         

 

             TOTAL BILI (test code = 0.7 mg/dL    0.1-1.1                   



             5148774470)                                         

 

             CALCIUM (test code = 8.9 mg/dL    8.6-10.6                  



             8814133786)                                         

 

             T PROTEIN (test code = 7.3 g/dL     6.3-8.2                   



             7624562753)                                         

 

             ALBUMIN (test code = 3.8 g/dL     3.5-5.0                   



             5616389546)                                         

 

             ALK PHOS (test code = 135 U/L             H            



             4215501706)                                         

 

             ALTv (test code = 120 U/L      5-50         H            



             2-6)                                             

 

             AST(SGOT) (test code = 33 U/L       13-40                     



             1619305344)                                         

 

             eGFR (test code =              mL/min/1.73m2              



             1640050090)                                         

 

             MAL (test code = MAL) Association of                           



                          Glomerular Filtration                           



                          Rate (GFR) and Staging                           



                          of Kidney Disease*                           



                          +---------------------                           



                          --+-------------------                           



                          --+-------------------                           



                          ------+| GFR                           



                          (mL/min/1.73 m2) ?|                           



                          With Kidney Damage ?|                           



                          ?Without Kidney                           



                          Damage+---------------                           



                          --------+-------------                           



                          --------+-------------                           



                          ------------+| ?>90 ?                           



                          ? ? ? ? ? ? ? ?|                           



                          ?Stage one ? ? ? ? ?|                           



                          ? Normal ? ? ? ? ? ? ?                           



                          ?+--------------------                           



                          ---+------------------                           



                          ---+------------------                           



                          -------+| ?60-89 ? ? ?                           



                          ? ? ? ? ?| ?Stage two                           



                          ? ? ? ? ?| ? Decreased                           



                          GFR ? ? ? ?                            



                          +---------------------                           



                          --+-------------------                           



                          --+-------------------                           



                          ------+| ?30-59 ? ? ?                           



                          ? ? ? ? ?| ?Stage                           



                          three ? ? ? ?| ? Stage                           



                          three ? ? ? ? ?                           



                          +---------------------                           



                          --+-------------------                           



                          --+-------------------                           



                          ------+| ?15-29 ? ? ?                           



                          ? ? ? ? ?| ?Stage four                           



                          ? ? ? ? | ? Stage four                           



                          ? ? ? ? ?                              



                          ?+--------------------                           



                          ---+------------------                           



                          ---+------------------                           



                          -------+| ?<15 (or                           



                          dialysis) ? ?| ?Stage                           



                          five ? ? ? ? | ? Stage                           



                          five ? ? ? ? ?                           



                          ?+--------------------                           



                          ---+------------------                           



                          ---+------------------                           



                          -------+ *Each stage                           



                          assumes the associated                           



                          GFR level has been in                           



                          effect for at least                           



                          three months. ?Stages                           



                          1 to 5, with or                           



                          without kidney                           



                          disease, indicate                           



                          chronic kidney                           



                          disease. Notes:                           



                          Determination of                           



                          stages one and two                           



                          (with eGFR                             



                          >59mL/min/1.73 m2)                           



                          requires estimation of                           



                          kidney damage for at                           



                          least three months as                           



                          defined by structural                           



                          or functional                           



                          abnormalities of the                           



                          kidney, manifested by                           



                          either:Pathological                           



                          abnormalities or                           



                          Markers of kidney                           



                          damage (including                           



                          abnormalities in the                           



                          composition of the                           



                          blood or urine or                           



                          abnormalities in                           



                          imaging tests).                           

 

             Lab Interpretation Abnormal                               



             (test code = 87715-1)                                        



[facility]





             Test Item    Value        Reference Range Interpretation Comments

 

             MAGNESIUM (test code = 0751891310) 1.6 mg/dL    1.7-2.4      L     

       

 

             Lab Interpretation (test code = Abnormal                           

    



             75721-7)                                            



[facility]





             Test Item    Value        Reference Range Interpretation Comments

 

             PHOSPHORUS (test code = 7589005498) 4.1 mg/dL    2.5-5.0           

        

 

             Lab Interpretation (test code = Normal                             

    



             38650-6)                                            



[id number] 11:44:15





             Test Item    Value        Reference Range Interpretation Comments

 

             CK (test code = 3434264101) 50 U/L                           

 

             Lab Interpretation (test code = Normal                             

    



             76397-3)                                            



[facility] [id number] 11:44:15





             Test Item    Value        Reference Range Interpretation Comments

 

             CK (test code = 7048043778) 50 U/L                           

 

             Lab Interpretation (test code = Normal                             

    



             19138-7)                                            



[facility] [id number] 11:28:14





             Test Item    Value        Reference Range Interpretation Comments

 

             WBC (test code =              See_Comment                [Automated



             7090-2)                                             message] The sy

stem



                                                                 which generated



                                                                 this result



                                                                 transmitted



                                                                 reference range

:



                                                                 4.20 - 10.70



                                                                 10*3/?L. The



                                                                 reference range

 was



                                                                 not used to



                                                                 interpret this



                                                                 result as



                                                                 normal/abnormal

.

 

             RBC (test code =              See_Comment                [Automated



             209-8)                                              message] The sy

stem



                                                                 which generated



                                                                 this result



                                                                 transmitted



                                                                 reference range

:



                                                                 4.26 - 5.52



                                                                 10*6/?L. The



                                                                 reference range

 was



                                                                 not used to



                                                                 interpret this



                                                                 result as



                                                                 normal/abnormal

.

 

             HGB (test code = 15.0 g/dL    12.2-16.4                 



             718-7)                                              

 

             HCT (test code = 44.6 %       38.4-49.3                 



             4544-3)                                             

 

             MCV (test code = 87.6 fL      81.7-95.6                 



             787-2)                                              

 

             MCH (test code = 29.5 pg      26.1-32.7                 



             785-6)                                              

 

             MCHC (test code = 33.6 g/dL    31.2-35.0                 



             786-4)                                              

 

             RDW-SD (test code = 48.4 fL      38.5-51.6                 



             54806-1)                                            

 

             RDW-CV (test code = 15.1 %       12.1-15.4                 



             788-0)                                              

 

             PLT (test code =              See_Comment                [Automated



             257-3)                                              message] The sy

stem



                                                                 which generated



                                                                 this result



                                                                 transmitted



                                                                 reference range

:



                                                                 150 - 328 10*3/

?L.



                                                                 The reference r

zhen



                                                                 was not used to



                                                                 interpret this



                                                                 result as



                                                                 normal/abnormal

.

 

             MPV (test code = 10.2 fL      9.8-13.0                  



             31376-3)                                            

 

             NRBC/100 WBC (test              See_Comment                [Automat

ed



             code = 4840649341)                                        message] 

The system



                                                                 which generated



                                                                 this result



                                                                 transmitted



                                                                 reference range

:



                                                                 0.0 - 10.0 /100



                                                                 WBCs. The refer

ence



                                                                 range was not u

sed



                                                                 to interpret th

is



                                                                 result as



                                                                 normal/abnormal

.

 

             NRBC x10^3 (test code <0.01        See_Comment                [Auto

mated



             = 2306624459)                                        message] The s

ystem



                                                                 which generated



                                                                 this result



                                                                 transmitted



                                                                 reference range

:



                                                                 10*3/?L. The



                                                                 reference range

 was



                                                                 not used to



                                                                 interpret this



                                                                 result as



                                                                 normal/abnormal

.

 

             GRAN MAT (NEUT) % 67.8 %                                 



             (test code = 770-8)                                        

 

             IMM GRAN % (test code 0.80 %                                 



             = 1372303501)                                        

 

             LYMPH % (test code = 19.8 %                                 



             736-9)                                              

 

             MONO % (test code = 8.7 %                                  



             5905-5)                                             

 

             EOS % (test code = 2.7 %                                  



             713-8)                                              

 

             BASO % (test code = 0.2 %                                  



             706-2)                                              

 

             GRAN MAT x10^3(ANC) 6.56 10*3/uL 1.99-6.95                 



             (test code =                                        



             4207179542)                                         

 

             IMM GRAN x10^3 (test 0.08 10*3/uL 0.00-0.06    H            



             code = 6475253087)                                        

 

             LYMPH x10^3 (test code 1.91 10*3/uL 1.09-3.23                 



             = 731-0)                                            

 

             MONO x10^3 (test code 0.84 10*3/uL 0.36-1.02                 



             = 742-7)                                            

 

             EOS x10^3 (test code = 0.26 10*3/uL 0.06-0.53                 



             711-2)                                              

 

             BASO x10^3 (test code <0.03        0.01-0.09                 



             = 704-7)                                            

 

             Lab Interpretation Abnormal                               



             (test code = 84751-7)                                        



[id number]56[facility]10





             Test Item    Value        Reference Range Interpretation Comments

 

             IRON (test code = 1725809489) 46 ug/dL            L          

  

 

             TIBC (test code = 4943389086) 299 ug/dL    250-410                 

  

 

             % FE SAT (test code = 1062000425) 15 %         20-50        L      

      

 

             Lab Interpretation (test code = Abnormal                           

    



             28354-0)                                            



[id number]:56:10





             Test Item    Value        Reference Range Interpretation Comments

 

             IRON (test code = 1415425308) 46 ug/dL            L          

  

 

             TIBC (test code = 7968468715) 299 ug/dL    250-410                 

  

 

             % FE SAT (test code = 0623564212) 15 %         20-50        L      

      

 

             Lab Interpretation (test code = Abnormal                           

    



             18804-0)                                            



[id number] 10:13:03





             Test Item    Value        Reference Range Interpretation Comments

 

             FERRITIN (test code = 89.2 ng/mL                       



             9951521355)                                         

 

             MAL (test code = MAL) Biotin has been                           



                          reported to cause a                           



                          negative bias,                           



                          interpret results                           



                          relative to                            



                          patient's use of                           



                          biotin.                                

 

             Lab Interpretation (test Normal                                 



             code = 35704-3)                                        



[facility] [id number] 10:13:03





             Test Item    Value        Reference Range Interpretation Comments

 

             FERRITIN (test code = 89.2 ng/mL   18.0-464.0                



             8301903753)                                         

 

             MAL (test code = MAL) Biotin has been                           



                          reported to cause a                           



                          negative bias,                           



                          interpret results                           



                          relative to                            



                          patient's use of                           



                          biotin.                                

 

             Lab Interpretation (test Normal                                 



             code = 54195-5)                                        



[facility] [id number] 10:09:06





             Test Item    Value        Reference Range Interpretation Comments

 

             TSH (test code =              See_Comment                [Automated

 message]



             1848075549)                                         The system [employer]



                                                                 generated this 

result



                                                                 transmitted ref

erence



                                                                 range: 0.45 - 4

.70



                                                                 mIU/L. The refe

rence



                                                                 range was not u

sed to



                                                                 interpret this 

result



                                                                 as normal/abnor

mal.

 

             Lab Interpretation (test Normal                                 



             code = 70860-8)                                        



[facility] [id number] 10:09:06





             Test Item    Value        Reference Range Interpretation Comments

 

             TSH (test code =              See_Comment                [Automated

 message]



             8730642329)                                         The system [employer]



                                                                 generated this 

result



                                                                 transmitted ref

erence



                                                                 range: 0.45 - 4

.70



                                                                 mIU/L. The refe

rence



                                                                 range was not u

sed to



                                                                 interpret this 

result



                                                                 as normal/abnor

mal.

 

             Lab Interpretation (test Normal                                 



             code = 59199-6)                                        



[id number]





             Test Item    Value        Reference Range Interpretation Comments

 

             NT-proBNP (test code 12 pg/mL     See_Comment                [Autom

ated



             = [id number])                                        message] The



                                                                 system which



                                                                 generated this



                                                                 result



                                                                 transmitted



                                                                 reference range

:



                                                                 <=125. The



                                                                 reference range



                                                                 was not used to



                                                                 interpret this



                                                                 result as



                                                                 normal/abnormal

.

 

             MAL (test code = MAL) Biotin has been                           



                          reported to                            



                          cause a negative                           



                          bias, interpret                           



                          results relative                           



                          to patient's use                           



                          of biotin.                             

 

             Lab Interpretation Normal                                 



             (test code = 10925-5)                                        



[id number]TOTAL CHOLESTEROL, 
TRIGLYCERIDES, HDL)2022 09:35:58





             Test Item    Value        Reference Range Interpretation Comments

 

             CHOL (test code = 250 mg/dL    120-200      H            



             [id number])                                         

 

             HDL (test code = 34 mg/dL     See_Comment  L             [Automated

 message]



             3443694094)                                         The system [employer]



                                                                 generated this



                                                                 result transmit

huma



                                                                 reference range

:



                                                                 >=40. The refer

ence



                                                                 range was not u

sed



                                                                 to interpret th

is



                                                                 result as



                                                                 normal/abnormal

.

 

             HDLC RATIO (test code =              See_Comment  H             [Au

tomated message]



             1464558774)                                         The system [employer]



                                                                 generated this



                                                                 result transmit

huma



                                                                 reference range

:



                                                                 <=5.0. The refe

rence



                                                                 range was not u

sed



                                                                 to interpret th

is



                                                                 result as



                                                                 normal/abnormal

.

 

             TRIG (test code = 394 mg/dL           H            



             9447854639)                                         

 

             LDL CHOL (test code = 137 mg/dL    See_Comment                [Auto

mated message]



             78460-0)                                            The system [employer]



                                                                 generated this



                                                                 result transmit

huma



                                                                 reference range

:



                                                                 <=160. The refe

rence



                                                                 range was not u

sed



                                                                 to interpret th

is



                                                                 result as



                                                                 normal/abnormal

.

 

             VLDL (test code = 79 mg/dL     5-60         H            



             0223219765)                                         

 

             Lab Interpretation (test Abnormal                               



             code = 06155-8)                                        



[id number]TOTAL CHOLESTEROL, 
TRIGLYCERIDES, HDL)2022 09:35:58





             Test Item    Value        Reference Range Interpretation Comments

 

             CHOL (test code = 250 mg/dL    120-200      H            



             0343883976)                                         

 

             HDL (test code = 34 mg/dL     >40          L            



             5082111668)                                         

 

             HDLC RATIO (test code =              See_Comment  H             [Au

tomated message]



             7902110359)                                         The system [employer]



                                                                 generated this



                                                                 result transmit

huma



                                                                 reference range

:



                                                                 <=5.0. The refe

rence



                                                                 range was not u

sed



                                                                 to interpret th

is



                                                                 result as



                                                                 normal/abnormal

.

 

             TRIG (test code = 394 mg/dL           H            



             9226116973)                                         

 

             LDL CHOL (test code = 137 mg/dL    See_Comment                [Auto

mated message]



             22374-5)                                            The system [employer]



                                                                 generated this



                                                                 result transmit

huma



                                                                 reference range

:



                                                                 <=160. The refe

rence



                                                                 range was not u

sed



                                                                 to interpret th

is



                                                                 result as



                                                                 normal/abnormal

.

 

             VLDL (test code = 79 mg/dL     5-60         H            



             0947397571)                                         

 

             Lab Interpretation (test Abnormal                               



             code = 12499-9)                                        



[facility]





             Test Item    Value        Reference Range Interpretation Comments

 

             MAGNESIUM (test code = 2560546191) 1.5 mg/dL    1.7-2.4      L     

       

 

             Lab Interpretation (test code = Abnormal                           

    



             41214-7)                                            



[facility]42





             Test Item    Value        Reference Range Interpretation Comments

 

             PHOSPHORUS (test code = 8133483458) 3.3 mg/dL    2.5-5.0           

        

 

             Lab Interpretation (test code = Normal                             

    



             74485-3)                                            



[id number][facility] 
01:29:09





             Test Item    Value        Reference Range Interpretation Comments

 

             NA (test code = 142 mmol/L   135-145                   



             4344078204)                                         

 

             K (test code = 3.7 mmol/L   3.5-5.0                   



             3144972463)                                         

 

             CL (test code = 103 mmol/L                       



             5452197258)                                         

 

             CO2 TOTAL (test code = 27 mmol/L    23-31                     



             7808357129)                                         

 

             AGAP (test code =              2-16                      



             4148859745)                                         

 

             BUN (test code = 11 mg/dL     7-23                      



             4757721138)                                         

 

             GLUCOSE (test code = 166 mg/dL           H            



             1014338564)                                         

 

             CREATININE (test code = 0.61 mg/dL   0.60-1.25                 



             7733174040)                                         

 

             TOTAL BILI (test code = 0.8 mg/dL    0.1-1.1                   



             3706390913)                                         

 

             CALCIUM (test code = 9.3 mg/dL    8.6-10.6                  



             2867037428)                                         

 

             T PROTEIN (test code = 7.4 g/dL     6.3-8.2                   



             7476710769)                                         

 

             ALBUMIN (test code = 4.0 g/dL     3.5-5.0                   



             7342218601)                                         

 

             ALK PHOS (test code = 164 U/L             H            



             6636027295)                                         

 

             ALTv (test code = 149 U/L      5-50         H            



             1742-6)                                             

 

             AST(SGOT) (test code = 29 U/L       13-40                     



             7117997510)                                         

 

             eGFR (test code =              mL/min/1.73m2              



             7163933861)                                         

 

             MAL (test code = MAL) Association of                           



                          Glomerular Filtration                           



                          Rate (GFR) and Staging                           



                          of Kidney Disease*                           



                          +---------------------                           



                          --+-------------------                           



                          --+-------------------                           



                          ------+| GFR                           



                          (mL/min/1.73 m2) ?|                           



                          With Kidney Damage ?|                           



                          ?Without Kidney                           



                          Damage+---------------                           



                          --------+-------------                           



                          --------+-------------                           



                          ------------+| ?>90 ?                           



                          ? ? ? ? ? ? ? ?|                           



                          ?Stage one ? ? ? ? ?|                           



                          ? Normal ? ? ? ? ? ? ?                           



                          ?+--------------------                           



                          ---+------------------                           



                          ---+------------------                           



                          -------+| ?60-89 ? ? ?                           



                          ? ? ? ? ?| ?Stage two                           



                          ? ? ? ? ?| ? Decreased                           



                          GFR ? ? ? ?                            



                          +---------------------                           



                          --+-------------------                           



                          --+-------------------                           



                          ------+| ?30-59 ? ? ?                           



                          ? ? ? ? ?| ?Stage                           



                          three ? ? ? ?| ? Stage                           



                          three ? ? ? ? ?                           



                          +---------------------                           



                          --+-------------------                           



                          --+-------------------                           



                          ------+| ?15-29 ? ? ?                           



                          ? ? ? ? ?| ?Stage four                           



                          ? ? ? ? | ? Stage four                           



                          ? ? ? ? ?                              



                          ?+--------------------                           



                          ---+------------------                           



                          ---+------------------                           



                          -------+| ?<15 (or                           



                          dialysis) ? ?| ?Stage                           



                          five ? ? ? ? | ? Stage                           



                          five ? ? ? ? ?                           



                          ?+--------------------                           



                          ---+------------------                           



                          ---+------------------                           



                          -------+ *Each stage                           



                          assumes the associated                           



                          GFR level has been in                           



                          effect for at least                           



                          three months. ?Stages                           



                          1 to 5, with or                           



                          without kidney                           



                          disease, indicate                           



                          chronic kidney                           



                          disease. Notes:                           



                          Determination of                           



                          stages one and two                           



                          (with eGFR                             



                          >59mL/min/1.73 m2)                           



                          requires estimation of                           



                          kidney damage for at                           



                          least three months as                           



                          defined by structural                           



                          or functional                           



                          abnormalities of the                           



                          kidney, manifested by                           



                          either:Pathological                           



                          abnormalities or                           



                          Markers of kidney                           



                          damage (including                           



                          abnormalities in the                           



                          composition of the                           



                          blood or urine or                           



                          abnormalities in                           



                          imaging tests).                           

 

             Lab Interpretation Abnormal                               



             (test code = 27558-6)                                        



[facility] [id number] 
01:23:46





             Test Item    Value        Reference Range Interpretation Comments

 

             APTT Patient (test              See_Comment                [Automat

ed



             code = 3173-2)                                        message] The



                                                                 system which



                                                                 generated this



                                                                 result



                                                                 transmitted



                                                                 reference range

:



                                                                 23 - 38 Seconds

.



                                                                 The reference



                                                                 range was not



                                                                 used to interpr

et



                                                                 this result as



                                                                 normal/abnormal

.

 

             MAL (test code = MAL) The [facility] patient                           



                          population mean                           



                          normal value for                           



                          aPTT is 30                             



                          seconds.                               

 

             Lab Interpretation Normal                                 



             (test code = 14569-5)                                        



[facility] [id number] 01:21:51





             Test Item    Value        Reference Range Interpretation Comments

 

             PROTIME PATIENT (test              See_Comment                [Auto

mated message]



             code = 5964-2)                                        The system wh

ich



                                                                 generated this 

result



                                                                 transmitted ref

erence



                                                                 range: 12.0 - 1

4.7



                                                                 Seconds. The re

ference



                                                                 range was not u

sed to



                                                                 interpret this 

result



                                                                 as normal/abnor

mal.

 

             INR (test code = 6301-6)                                        Nor

mal INR <1.1;



                                                                 Warfarin Therap

eutic



                                                                 range 2.0 to 3.

0 or



                                                                 2.5 to 3.5, dep

ending



                                                                 upon the indica

tions.

 

             Lab Interpretation (test Normal                                 



             code = 51274-7)                                        



[id number] 01:15:28





             Test Item    Value        Reference Range Interpretation Comments

 

             WBC (test code =              See_Comment  H             [Automated



             7490-2)                                             message] The sy

stem



                                                                 which generated



                                                                 this result



                                                                 transmitted



                                                                 reference range

:



                                                                 4.20 - 10.70



                                                                 10*3/?L. The



                                                                 reference range

 was



                                                                 not used to



                                                                 interpret this



                                                                 result as



                                                                 normal/abnormal

.

 

             RBC (test code =              See_Comment  H             [Automated



             049-8)                                              message] The sy

stem



                                                                 which generated



                                                                 this result



                                                                 transmitted



                                                                 reference range

:



                                                                 4.26 - 5.52



                                                                 10*6/?L. The



                                                                 reference range

 was



                                                                 not used to



                                                                 interpret this



                                                                 result as



                                                                 normal/abnormal

.

 

             HGB (test code = 16.5 g/dL    12.2-16.4    H            



             718-7)                                              

 

             HCT (test code = 49.1 %       38.4-49.3                 



             4544-3)                                             

 

             MCV (test code = 87.7 fL      81.7-95.6                 



             787-2)                                              

 

             MCH (test code = 29.5 pg      26.1-32.7                 



             785-6)                                              

 

             MCHC (test code = 33.6 g/dL    31.2-35.0                 



             786-4)                                              

 

             RDW-SD (test code = 48.4 fL      38.5-51.6                 



             69722-8)                                            

 

             RDW-CV (test code = 15.1 %       12.1-15.4                 



             788-0)                                              

 

             PLT (test code =              See_Comment                [Automated



             777-3)                                              message] The sy

stem



                                                                 which generated



                                                                 this result



                                                                 transmitted



                                                                 reference range

:



                                                                 150 - 328 10*3/

?L.



                                                                 The reference r

zhen



                                                                 was not used to



                                                                 interpret this



                                                                 result as



                                                                 normal/abnormal

.

 

             MPV (test code = 10.1 fL      9.8-13.0                  



             25453-2)                                            

 

             NRBC/100 WBC (test              See_Comment                [Automat

ed



             code = [id number])                                        message] 

The system



                                                                 which generated



                                                                 this result



                                                                 transmitted



                                                                 reference range

:



                                                                 0.0 - 10.0 /100



                                                                 WBCs. The refer

ence



                                                                 range was not u

sed



                                                                 to interpret th

is



                                                                 result as



                                                                 normal/abnormal

.

 

             NRBC x10^3 (test code <0.01        See_Comment                [Auto

mated



             = [id number])                                        message] The s

ystem



                                                                 which generated



                                                                 this result



                                                                 transmitted



                                                                 reference range

:



                                                                 10*3/?L. The



                                                                 reference range

 was



                                                                 not used to



                                                                 interpret this



                                                                 result as



                                                                 normal/abnormal

.

 

             GRAN MAT (NEUT) % 72.7 %                                 



             (test code = 770-8)                                        

 

             IMM GRAN % (test code 0.60 %                                 



             = [id number])                                        

 

             LYMPH % (test code = 15.5 %                                 



             736-9)                                              

 

             MONO % (test code = 8.2 %                                  



             5905-5)                                             

 

             EOS % (test code = 2.6 %                                  



             713-8)                                              

 

             BASO % (test code = 0.4 %                                  



             706-2)                                              

 

             GRAN MAT x10^3(ANC) 7.83 10*3/uL 1.99-6.95    H            



             (test code =                                        



             [id number])                                         

 

             IMM GRAN x10^3 (test 0.07 10*3/uL 0.00-0.06    H            



             code = [id number])                                        

 

             LYMPH x10^3 (test code 1.67 10*3/uL 1.09-3.23                 



             = 731-0)                                            

 

             MONO x10^3 (test code 0.88 10*3/uL 0.36-1.02                 



             = 742-7)                                            

 

             EOS x10^3 (test code = 0.28 10*3/uL 0.06-0.53                 



             711-2)                                              

 

             BASO x10^3 (test code 0.04 10*3/uL 0.01-0.09                 



             = 704-7)                                            

 

             Lab Interpretation Abnormal                               



             (test code = 72276-0)                                        



[id number]:32





             Test Item    Value        Reference Range Interpretation Comments

 

             LACTIC ACID (test code = 1.86 mmol/L  0.50-2.20                 



             [id number])                                         

 

             Lab Interpretation (test code = Normal                             

    



             44600-2)                                            



[facility]2022-04-12 12:58:00





             Test Item    Value        Reference Range Interpretation Comments

 

             Glucose Lvl (test code = Glucose Lvl) 228          70-99           

          



[facility]2022-04-12 12:58:00





             Test Item    Value        Reference Range Interpretation Comments

 

             BUN (test code = BUN) 23           7-22                      



[facility]2022-04-12 12:58:00





             Test Item    Value        Reference Range Interpretation Comments

 

             Creatinine Lvl (test code = Creatinine 0.78         0.50-1.40      

           



             Lvl)                                                



[facility]2022-04-12 12:58:00





             Test Item    Value        Reference Range Interpretation Comments

 

             Sodium Lvl (test code = Sodium Lvl) 138          135-145           

        



[facility]2022-04-12 12:58:00





             Test Item    Value        Reference Range Interpretation Comments

 

             Potassium Lvl (test code = Potassium 4.6          3.5-5.1          

         



             Lvl)                                                



[facility]2022-04-12 12:58:00





             Test Item    Value        Reference Range Interpretation Comments

 

             Chloride Lvl (test code = Chloride Lvl) 108                  

            



[facility]2022-04-12 12:58:00





             Test Item    Value        Reference Range Interpretation Comments

 

             CO2 (test code = CO2) 23           24-32                     



[facility]2022-04-12 12:58:00





             Test Item    Value        Reference Range Interpretation Comments

 

             Calcium Lvl (test code = Calcium Lvl) 9.0          8.5-10.5        

          



[facility]2022-04-12 12:58:00





             Test Item    Value        Reference Range Interpretation Comments

 

             AGAP (test code = AGAP) 11.6         10.0-20.0                 



[facility]2022-04-12 12:58:00





             Test Item    Value        Reference Range Interpretation Comments

 

             eGFR (test code = eGFR) 116                                    



[facility] CenterIkwymbnHLJOTALBAZ2117-04-[facility] 12:58:00





             Test Item    Value        Reference Range Interpretation Comments

 

             WBC (test code = WBC) 10.5         3.7-10.4                  



[facility] CenterIoefipdXELIJLIQFK0061-31-25 12:58:00





             Test Item    Value        Reference Range Interpretation Comments

 

             RBC (test code = RBC) 5.48         4.70-6.10                 



[facility] CenterJlkpmdjPBPSMDZCZB3602-48-38 12:58:00





             Test Item    Value        Reference Range Interpretation Comments

 

             Hgb (test code = Hgb) 16.0         14.0-18.0                 



[facility] CenterXgrqbcuGVSLKSFDKM2118-67-60 12:58:00





             Test Item    Value        Reference Range Interpretation Comments

 

             Hct (test code = Hct) 49.8         42.0-54.0                 



[facility] CenterUlsnkffUSUQSTDOSM6416-23-83 12:58:00





             Test Item    Value        Reference Range Interpretation Comments

 

             MCV (test code = MCV) 90.8         80.0-94.0                 



[facility] CenterBuobothAOPMRARQLH4773-74-62 12:58:00





             Test Item    Value        Reference Range Interpretation Comments

 

             MCH (test code = MCH) 29.1 pg      27.0-31.0                 



[facility] CenterBghuxqnEHEXQQSOIK5624-79-96 12:58:00





             Test Item    Value        Reference Range Interpretation Comments

 

             MCHC (test code = MCHC) 32.1         32.0-36.0                 



[facility] CenterUcmkvukXFYMNAVVRM2456-32-43 12:58:00





             Test Item    Value        Reference Range Interpretation Comments

 

             RDW (test code = RDW) 15.5         11.5-14.5                 



[facility] CenterIzfhsooASJGCMDDLS1937-61-38 12:58:00





             Test Item    Value        Reference Range Interpretation Comments

 

             Platelet (test code = Platelet) 312          133-450               

    



[facility] CenterDdcrrafBEPLZDSWCB1463-17-08 12:58:00





             Test Item    Value        Reference Range Interpretation Comments

 

             MPV (test code = MPV) 8.3          7.4-10.4                  



[facility] CenterRddlydtGSEREIYMTV0855-20-44 12:58:00





             Test Item    Value        Reference Range Interpretation Comments

 

             PT (test code = PT) 12.9 s       12.0-14.7                 



[facility] CenterXikhtacEHFGXGNBXC3269-02-51 12:58:00





             Test Item    Value        Reference Range Interpretation Comments

 

             INR (test code = INR) 0.98 1       0.85-1.17                 



[facility] CenterWbighloUWALYIEJEF4486-09-24 12:58:00





             Test Item    Value        Reference Range Interpretation Comments

 

             PTT (test code = PTT) 27.3 s       22.9-35.8                 



[place]22712-04-12 12:58:00





             Test Item    Value        Reference Range Interpretation Comments

 

             Segs (test code = Segs) 71.2         45.0-75.0                 



[place] 822712-04-12 12:58:00





             Test Item    Value        Reference Range Interpretation Comments

 

             Lymphocytes (test code = Lymphocytes) 21.1         20.0-40.0       

          



[place] [address]-04-12 12:58:00





             Test Item    Value        Reference Range Interpretation Comments

 

             Monocytes (test code = Monocytes) 6.8          2.0-12.0            

      



[place] [address]-04-12 12:58:00





             Test Item    Value        Reference Range Interpretation Comments

 

             Eosinophils (test code = 0.1          See_Comment                [A

utomated message] The



             Eosinophils)                                        system which ge

nerated



                                                                 this result tra

nsmitted



                                                                 reference range

: <=4.0.



                                                                 The reference r

zhen was



                                                                 not used to int

erpret



                                                                 this result as



                                                                 normal/abnormal

.



[place] 822712-04-12 12:58:00





             Test Item    Value        Reference Range Interpretation Comments

 

             Basophils (test code = 0.8          See_Comment                [Aut

omated message] The



             Basophils)                                          system which ge

nerated



                                                                 this result tra

nsmitted



                                                                 reference range

: <=1.0.



                                                                 The reference r

zhen was



                                                                 not used to int

erpret



                                                                 this result as



                                                                 normal/abnormal

.



[place] 822712-04-12 12:58:00





             Test Item    Value        Reference Range Interpretation Comments

 

             Neutrophils # (test code = Neutrophils 7.5          1.5-8.1        

           



             #)                                                  



[place] 822712-04-12 12:58:00





             Test Item    Value        Reference Range Interpretation Comments

 

             Lymphocytes # (test code = Lymphocytes 2.2          1.0-5.5        

           



             #)                                                  



[place] [address]-04-12 12:58:00





             Test Item    Value        Reference Range Interpretation Comments

 

             Monocytes # (test code 0.7          See_Comment                [Aut

omated message] The



             = Monocytes #)                                        system which 

generated



                                                                 this result tra

nsmitted



                                                                 reference range

: <=0.8.



                                                                 The reference r

zhen was



                                                                 not used to int

erpret



                                                                 this result as



                                                                 normal/abnormal

.



[place] [address]-04-12 12:58:00





             Test Item    Value        Reference Range Interpretation Comments

 

             Basophils # (test code 0.1          See_Comment                [Aut

omated message] The



             = Basophils #)                                        system which 

generated



                                                                 this result tra

nsmitted



                                                                 reference range

: <=0.2.



                                                                 The reference r

zhen was



                                                                 not used to int

erpret



                                                                 this result as



                                                                 normal/abnormal

.



[facility] [id number] 11:59:00





             Test Item    Value        Reference Range Interpretation Comments

 

             Tube Num CSF (test code = Tube Num CSF) 3 1                        

            



[facility]2022-04-12 11:59:00





             Test Item    Value        Reference Range Interpretation Comments

 

             Color CSF (test code Colorless (22 6:59                       

    



             = Color CSF) AM)                                    



[facility]2022-04-12 11:59:00





             Test Item    Value        Reference Range Interpretation Comments

 

             Clarity CSF (test code = Clear (22 6:59                       

    



             Clarity CSF) AM)                                    



[facility]2022-04-12 11:59:00





             Test Item    Value        Reference Range Interpretation Comments

 

             Supernat CSF (test Colorless (22 6:59                         

  



             code = Supernat CSF) AM)                                    



[facility]2022-04-12 11:59:00





             Test Item    Value        Reference Range Interpretation Comments

 

             Nucleated Cells CSF 3            See_Comment                [Automa

huma message] The



             (test code = Nucleated                                        syste

m which generated



             Cells CSF)                                          this result tra

nsmitted



                                                                 reference range

: <=53.



                                                                 The reference r

zhen was



                                                                 not used to int

erpret



                                                                 this result as



                                                                 normal/abnormal

.



[facility] [id number] 11:59:00





             Test Item    Value        Reference Range Interpretation Comments

 

             RBC CSF (test code = 64           See_Comment                [Autom

ated message] The



             RBC CSF)                                            system which ge

nerated this



                                                                 result transmit

huma



                                                                 reference range

: <=03. The



                                                                 reference range

 was not



                                                                 used to interpr

et this



                                                                 result as zayda

l/abnormal.



[facility] HHCMIE5622-69-03 11:59:00





             Test Item    Value        Reference Range Interpretation Comments

 

             Comment CSF (test Differential not performed                       

    



             code = Comment CSF) on WBC count of less than                      

     



                          5. Cell counts performed                           



                          on CSF greater than two                           



                          hours after collection may                           



                          not be representative due                           



                          to cellular degradation.                           



[facility] [id number] 11:59:00





             Test Item    Value        Reference Range Interpretation Comments

 

             Glucose CSF (test code = Glucose CSF) 129          45-80           

          



[facility] [id number] 11:59:00





             Test Item    Value        Reference Range Interpretation Comments

 

             Protein CSF (test code = Protein CSF) 110          15-45           

          



[facility] Oeppuo6931-91-66 11:59:00





             Test Item    Value        Reference Range Interpretation Comments

 

             Gram Stain Report Gram Stain Performed By:                         

  



             (test code = Gram [facility]                           



             Stain Report) [facility]Gram Puasl1121-08-10 11:59:00





             Test Item    Value        Reference Range Interpretation Comments

 

             Culture: CSF w/Gram 48 Hour Report - No                           



             Stain (test code = Growth, Holding                           



             Culture: CSF w/Gram                                        



             Stain)                                              



[facility] [id number] 14:46:00





             Test Item    Value        Reference Range Interpretation Comments

 

             Hgb A1C (test code = Hgb A1C) 5.6                                  

  



[facility] CenterTourjive ODJZI1348-12-08 11:43:00





             Test Item    Value        Reference Range Interpretation Comments

 

             Glucose Lvl (test code = Glucose Lvl) 418          70-99           

          



[facility] [id number] 11:43:00





             Test Item    Value        Reference Range Interpretation Comments

 

             BUN (test code = BUN) 22           7-22                      



[facility]2022-04-11 11:43:00





             Test Item    Value        Reference Range Interpretation Comments

 

             Creatinine Lvl (test code = Creatinine 1.10         0.50-1.40      

           



             Lvl)                                                



[facility] [id number] 11:43:00





             Test Item    Value        Reference Range Interpretation Comments

 

             Sodium Lvl (test code = Sodium Lvl) 138          135-145           

        



[facility]2022-04-11 11:43:00





             Test Item    Value        Reference Range Interpretation Comments

 

             Potassium Lvl (test code = Potassium 4.9          3.5-5.1          

         



             Lvl)                                                



[facility] [id number] 11:43:00





             Test Item    Value        Reference Range Interpretation Comments

 

             Chloride Lvl (test code = Chloride Lvl) 113                  

            



[facility] [id number] 11:43:00





             Test Item    Value        Reference Range Interpretation Comments

 

             CO2 (test code = CO2) 16           24-32                     



[facility]2022-04-11 11:43:00





             Test Item    Value        Reference Range Interpretation Comments

 

             AGAP (test code = AGAP) 13.9         10.0-20.0                 



[facility] [id number] 11:43:00





             Test Item    Value        Reference Range Interpretation Comments

 

             Calcium Lvl (test code = Calcium Lvl) 9.0          8.5-10.5        

          



[facility]2022-04-11 11:43:00





             Test Item    Value        Reference Range Interpretation Comments

 

             eGFR (test code = eGFR) 86                                     



[facility] CenterFnztlvfRUSGYCFITX9296-71-18 11:43:00





             Test Item    Value        Reference Range Interpretation Comments

 

             WBC (test code = WBC) 10.2         3.7-10.4                  



[facility] CenterHdmkrshUDCMWXUNOY9515-34-69 11:43:00





             Test Item    Value        Reference Range Interpretation Comments

 

             RBC (test code = RBC) 5.46         4.70-6.10                 



[facility] CenterRzdveaqBBRLPFLCWG3117-17-07 11:43:00





             Test Item    Value        Reference Range Interpretation Comments

 

             Hgb (test code = Hgb) 16.3         14.0-18.0                 



[facility] CenterJgyhgbfMTQWEMYZMW3782-41-63 11:43:00





             Test Item    Value        Reference Range Interpretation Comments

 

             Hct (test code = Hct) 50.0         42.0-54.0                 



[facility] [facility] [id number] 11:43:00





             Test Item    Value        Reference Range Interpretation Comments

 

             MCV (test code = MCV) 91.4         80.0-94.0                 



[facility] C[id number] 11:43:00





             Test Item    Value        Reference Range Interpretation Comments

 

             MCH (test code = MCH) 29.9 pg      27.0-31.0                 



[facility] Cent[id number] 11:43:00





             Test Item    Value        Reference Range Interpretation Comments

 

             MCHC (test code = MCHC) 32.7         32.0-36.0                 



[facility] CenterTwdsnexOEATSTOSST0040-21-06 11:43:00





             Test Item    Value        Reference Range Interpretation Comments

 

             RDW (test code = RDW) 15.7         11.5-14.5                 



[facility] CenterXkuktccDTBXROWOKC6462-65-88 11:43:00





             Test Item    Value        Reference Range Interpretation Comments

 

             Platelet (test code = Platelet) 321          133-450               

    



[facility] Cent[id number] 11:43:00





             Test Item    Value        Reference Range Interpretation Comments

 

             MPV (test code = MPV) 8.6          7.4-10.4                  



[facility] CenterDaecdwtJBVVQXYPQY5514-48-05 11:43:00





             Test Item    Value        Reference Range Interpretation Comments

 

             Segs (test code = Segs) 92.0         45.0-75.0                 



[facility] CenterRbtqpafJGBCYMFDTX6964-92-43 11:43:00





             Test Item    Value        Reference Range Interpretation Comments

 

             Lymphocytes (test code = Lymphocytes) 5.2          20.0-40.0       

          



[facility] CenterMnzsebtJDQMQPWLYD8625-86-31 11:43:00





             Test Item    Value        Reference Range Interpretation Comments

 

             Monocytes (test code = Monocytes) 1.6          2.0-12.0            

      



[facility] CenterDpuvvumYIPJCWCCPZ1511-10-44 11:43:00





             Test Item    Value        Reference Range Interpretation Comments

 

             Basophils (test code = 1.2          See_Comment                [Aut

omated message] The



             Basophils)                                          system which ge

nerated



                                                                 this result tra

nsmitted



                                                                 reference range

: <=1.0.



                                                                 The reference r

zhen was



                                                                 not used to int

erpret



                                                                 this result as



                                                                 normal/abnormal

.



[facility] [id number] 11:43:00





             Test Item    Value        Reference Range Interpretation Comments

 

             Neutrophils # (test code = Neutrophils 9.4          1.5-8.1        

           



             #)                                                  



[facility] CenterNunwfyuDPZBBEDGFU0469-30-54 11:43:00





             Test Item    Value        Reference Range Interpretation Comments

 

             Lymphocytes # (test code = Lymphocytes 0.5          1.0-5.5        

           



             #)                                                  



[facility] CenterUimsdnkCXGMYQZQXC6441-40-93 11:43:00





             Test Item    Value        Reference Range Interpretation Comments

 

             Monocytes # (test code 0.2          See_Comment                [Aut

omated message] The



             = Monocytes #)                                        system which 

generated



                                                                 this result tra

nsmitted



                                                                 reference range

: <=0.8.



                                                                 The reference r

zhen was



                                                                 not used to int

erpret



                                                                 this result as



                                                                 normal/abnormal

.



[facility] CenterIpzdaggGYDRNTRMHR3180-72-03 11:43:00





             Test Item    Value        Reference Range Interpretation Comments

 

             Basophils # (test code 0.1          See_Comment                [Aut

omated message] The



             = Basophils #)                                        system which 

generated



                                                                 this result tra

nsmitted



                                                                 reference range

: <=0.2.



                                                                 The reference r

zhen was



                                                                 not used to int

erpret



                                                                 this result as



                                                                 normal/abnormal

.



[facility] CenterTcjtiwmNNPZDDUYIV6489-83-07 09:18:00





             Test Item    Value        Reference Range Interpretation Comments

 

             Coronavirus (COVID-19) Not Detected (22                        

   



             ZEYAD (test code = 4:18 AM)                               



             Coronavirus (COVID-19)                                        



             ZEYAD)                                                



[facility]JENN[facility]PT:ISOLATE:ORDQN:[id number] 08:19:00





             Test Item    Value        Reference Range Interpretation Comments

 

             Culture: Urine (test >100,000 CFU/mL                           



             code = Culture: Providencia stuartii                           



             Urine)                                              



[facility] CenterCEFTRIAXONE:SUSC:PT:ISOLATE:ORDQN:[id number] 08:19:00





             Test Item    Value        Reference Range Interpretation Comments

 

             Providencia stuartii Providencia stuartii                          

 



             (test code = Providencia                                        



             stuartii)                                           



[facility] AND [id number] 08:19:00





             Test Item    Value        Reference Range Interpretation Comments

 

             UA Color (test code = Yellow *NA*(22 3:19                      

     



             UA Color)    AM)                                    



[facility] AND [id number] 08:19:00





             Test Item    Value        Reference Range Interpretation Comments

 

             UA Turbidity (test code Marked *ABN*(22                        

   



             = UA Turbidity) 3:19 AM)                               



[facility] AND [id number] 08:19:00





             Test Item    Value        Reference Range Interpretation Comments

 

             UA Spec Grav (test code = UA Spec 1.013 1                          

      



             Grav)                                               



[facility] AND [id number] 08:19:00





             Test Item    Value        Reference Range Interpretation Comments

 

             UA pH (test code = UA pH) 5.0 1        5.0-8.0                   



[facility] AND [id number] 08:19:00





             Test Item    Value        Reference Range Interpretation Comments

 

             UA Protein (test code = UA Negative mg/dL                          

 



             Protein)                                            



[facility] AND [id number] 08:19:00





             Test Item    Value        Reference Range Interpretation Comments

 

             UA Glucose (test code = UA Negative mg/dL                          

 



             Glucose)                                            



[facility] AND [id number] 08:19:00





             Test Item    Value        Reference Range Interpretation Comments

 

             UA Ketones (test code = UA Negative mg/dL                          

 



             Ketones)                                            



[facility] AND [id number] 08:19:00





             Test Item    Value        Reference Range Interpretation Comments

 

             UA Bili (test code = Negative *NA*(22                          

 



             UA Bili)     3:19 AM)                               



[facility] AND [id number] 08:19:00





             Test Item    Value        Reference Range Interpretation Comments

 

             UA Blood (test code = Small *ABN*(22                           



             UA Blood)    3:19 AM)                               



[facility] AND [id number] 08:19:00





             Test Item    Value        Reference Range Interpretation Comments

 

             UA Urobilinogen (test code = UA no gt        0.1-1.0               

    



             Urobilinogen)                                        



[facility] AND [id number] 08:19:00





             Test Item    Value        Reference Range Interpretation Comments

 

             UA Nitrite (test code Positive *ABN*(22                        

   



             = UA Nitrite) 3:19 AM)                               



[facility] AND [id number] 08:19:00





             Test Item    Value        Reference Range Interpretation Comments

 

             UA Leuk Est (test code Large *ABN*(22 3:19                     

      



             = UA Leuk Est) AM)                                    



[facility] AND [id number] 08:19:00





             Test Item    Value        Reference Range Interpretation Comments

 

             UA WBC (test code = no gt        See_Comment                [Automa

huma message] The



             UA WBC)                                             system which ge

nerated this



                                                                 result transmit

huma



                                                                 reference range

: <=5. The



                                                                 reference range

 was not



                                                                 used to interpr

et this



                                                                 result as zayda

l/abnormal.



[facility] AND [id number] 08:19:00





             Test Item    Value        Reference Range Interpretation Comments

 

             UA RBC (test code = 8            See_Comment                [Automa

huma message] The



             UA RBC)                                             system which ge

nerated this



                                                                 result transmit

huma



                                                                 reference range

: <=2. The



                                                                 reference range

 was not



                                                                 used to interpr

et this



                                                                 result as zayda

l/abnormal.



[facility] AND [id number] 08:19:00





             Test Item    Value        Reference Range Interpretation Comments

 

             UA Bacteria (test code = UA Occasional /HPF                        

   



             Bacteria)                                           



[facility] AND [id number] 08:19:00





             Test Item    Value        Reference Range Interpretation Comments

 

             UA Mucus (test code = UA Mucus) Few /LPF                           

    



[facility] AND [id number] 08:19:00





             Test Item    Value        Reference Range Interpretation Comments

 

             UA Amorph Ivonne (test code = Occasional /HPF                        

   



             UA Amorph Ivonne)                                        



[facility] AND [id number] 08:19:00





             Test Item    Value        Reference Range Interpretation Comments

 

             UA Sq Epi (test code = UA Sq Epi) None Seen                        

      



[facility] CenterCulture: [id number] 08:19:00





             Test Item    Value        Reference Range Interpretation Comments

 

             Culture: Urine (test Holding For Better                           



             code = Culture: Urine) Growth                                 



[facility] [id number] 00:20:00





             Test Item    Value        Reference Range Interpretation Comments

 

             ABO/Rh (test code = ABO/Rh) AB POS                                 



[facility] [id number] 00:20:00





             Test Item    Value        Reference Range Interpretation Comments

 

             Antibody Scrn (test Negative (22 7:20                          

 



             code = Antibody Scrn) PM)                                    



[facility]2022-04-09 00:20:00





             Test Item    Value        Reference Range Interpretation Comments

 

             Glucose Lvl (test code = Glucose Lvl) 74           70-99           

          



[facility]2022-04-09 00:20:00





             Test Item    Value        Reference Range Interpretation Comments

 

             BUN (test code = BUN) 15           7-22                      



[facility]2022-04-09 00:20:00





             Test Item    Value        Reference Range Interpretation Comments

 

             Creatinine Lvl (test code = Creatinine 0.61         0.50-1.40      

           



             Lvl)                                                



[facility]2022-04-09 00:20:00





             Test Item    Value        Reference Range Interpretation Comments

 

             Sodium Lvl (test code = Sodium Lvl) 139          135-145           

        



[facility]2022-04-09 00:20:00





             Test Item    Value        Reference Range Interpretation Comments

 

             Potassium Lvl (test code = Potassium 4.9          3.5-5.1          

         



             Lvl)                                                



[facility]2022-04-09 00:20:00





             Test Item    Value        Reference Range Interpretation Comments

 

             Chloride Lvl (test code = Chloride Lvl) 105                  

            



[facility] CenterTourjive [id number] 00:20:00





             Test Item    Value        Reference Range Interpretation Comments

 

             CO2 (test code = CO2) 30           24-32                     



[facility]2022-04-09 00:20:00





             Test Item    Value        Reference Range Interpretation Comments

 

             Calcium Lvl (test code = Calcium Lvl) 9.5          8.5-10.5        

          



[facility]2022-04-09 00:20:00





             Test Item    Value        Reference Range Interpretation Comments

 

             AGAP (test code = AGAP) 8.9          10.0-20.0                 



[facility] [id number] 00:20:00





             Test Item    Value        Reference Range Interpretation Comments

 

             eGFR (test code = eGFR) 129                                    



[facility]2022-04-09 00:20:00





             Test Item    Value        Reference Range Interpretation Comments

 

             Lactic Acid Lvl (test code = Lactic 1.2          0.5-2.2           

        



             Acid Lvl)                                           



[facility]2022-04-09 00:20:00





             Test Item    Value        Reference Range Interpretation Comments

 

             Procalcitonin Lvl (test 0.09         See_Comment                [Au

tomated message]



             code = Procalcitonin Lvl)                                        Th

e system which



                                                                 generated this 

result



                                                                 transmitted ref

erence



                                                                 range: <=0.10. 

The



                                                                 reference range

 was not



                                                                 used to interpr

et this



                                                                 result as



                                                                 normal/abnormal

.



[facility] [id number] 00:20:00





             Test Item    Value        Reference Range Interpretation Comments

 

             WBC (test code = WBC) 10.1         3.7-10.4                  



[facility] [id number] 00:20:00





             Test Item    Value        Reference Range Interpretation Comments

 

             RBC (test code = RBC) 5.14         4.70-6.10                 



[facility] [id number] 00:20:00





             Test Item    Value        Reference Range Interpretation Comments

 

             Hgb (test code = Hgb) 15.5         14.0-18.0                 



[facility] C[id number] 00:20:00





             Test Item    Value        Reference Range Interpretation Comments

 

             Hct (test code = Hct) 46.5         42.0-54.0                 



[facility] CenterSvnbovvFAVXXMWHQQ9579-07-15 00:20:00





             Test Item    Value        Reference Range Interpretation Comments

 

             MCV (test code = MCV) 90.5         80.0-94.0                 



[facility] [id number] 00:20:00





             Test Item    Value        Reference Range Interpretation Comments

 

             MCH (test code = MCH) 30.1 pg      27.0-31.0                 



[facility] CenterPkcsutkMYQZRKHLFS4226-08-29 00:20:00





             Test Item    Value        Reference Range Interpretation Comments

 

             MCHC (test code = MCHC) 33.3         32.0-36.0                 



[facility] [id number] 00:20:00





             Test Item    Value        Reference Range Interpretation Comments

 

             RDW (test code = RDW) 15.7         11.5-14.5                 



[facility] Cent[id number] 00:20:00





             Test Item    Value        Reference Range Interpretation Comments

 

             Platelet (test code = Platelet) 302          133-450               

    



[facility] [id number] 00:20:00





             Test Item    Value        Reference Range Interpretation Comments

 

             MPV (test code = MPV) 8.9          7.4-10.4                  



[facility] C[id number] 00:20:00





             Test Item    Value        Reference Range Interpretation Comments

 

             Sed Rate (test code = 17           See_Comment                [Auto

mated message] The



             Sed Rate)                                           system which ge

nerated this



                                                                 result transmit

huma



                                                                 reference range

: <=15. The



                                                                 reference range

 was not



                                                                 used to interpr

et this



                                                                 result as zayda

l/abnormal.



[facility] [id number] 00:20:00





             Test Item    Value        Reference Range Interpretation Comments

 

             PT (test code = PT) 13.1 s       12.0-14.7                 



[facility] CenterSpmuclyGTFPYSRABG7694-01-56 00:20:00





             Test Item    Value        Reference Range Interpretation Comments

 

             INR (test code = INR) 1.00 1       0.85-1.17                 



[place] [id number] 00:20:00





             Test Item    Value        Reference Range Interpretation Comments

 

             PTT (test code = PTT) 31.5 s       22.9-35.8                 



[place] [id number] 00:20:00





             Test Item    Value        Reference Range Interpretation Comments

 

             Segs (test code = Segs) 68.7         45.0-75.0                 



[facility] [id number] 00:20:00





             Test Item    Value        Reference Range Interpretation Comments

 

             Lymphocytes (test code = Lymphocytes) 19.6         20.0-40.0       

          



[facility] CenterLflvrvsGMSJGDXFCE7574-56-22 00:20:00





             Test Item    Value        Reference Range Interpretation Comments

 

             Monocytes (test code = Monocytes) 9.4          2.0-12.0            

      



[facility] CenterGameqmcCUBHQNLLMS1678-16-01 00:20:00





             Test Item    Value        Reference Range Interpretation Comments

 

             Eosinophils (test code = 1.4          See_Comment                [A

utomated message] The



             Eosinophils)                                        system which ge

nerated



                                                                 this result tra

nsmitted



                                                                 reference range

: <=4.0.



                                                                 The reference r

zhen was



                                                                 not used to int

erpret



                                                                 this result as



                                                                 normal/abnormal

.



[facility] [id number] 00:20:00





             Test Item    Value        Reference Range Interpretation Comments

 

             Basophils (test code = 0.9          See_Comment                [Aut

omated message] The



             Basophils)                                          system which ge

nerated



                                                                 this result tra

nsmitted



                                                                 reference range

: <=1.0.



                                                                 The reference r

zhen was



                                                                 not used to int

erpret



                                                                 this result as



                                                                 normal/abnormal

.



[facility] CenterYtcumatBAYCDVAKZM2275-84-53 00:20:00





             Test Item    Value        Reference Range Interpretation Comments

 

             Neutrophils # (test code = Neutrophils 6.9          1.5-8.1        

           



             #)                                                  



[place] [id number] 00:20:00





             Test Item    Value        Reference Range Interpretation Comments

 

             Lymphocytes # (test code = Lymphocytes 2.0          1.0-5.5        

           



             #)                                                  



[facility] CenterGiytwguXKBOGEJMEN6429-14-82 00:20:00





             Test Item    Value        Reference Range Interpretation Comments

 

             Monocytes # (test code 1.0          See_Comment                [Aut

omated message] The



             = Monocytes #)                                        system which 

generated



                                                                 this result tra

nsmitted



                                                                 reference range

: <=0.8.



                                                                 The reference r

zhen was



                                                                 not used to int

erpret



                                                                 this result as



                                                                 normal/abnormal

.



[facility] CenterDllvvlqNHMQMBEDKC6125-57-06 00:20:00





             Test Item    Value        Reference Range Interpretation Comments

 

             Eosinophils # (test code 0.1          See_Comment                [A

utomated message] The



             = Eosinophils #)                                        system ic

h generated



                                                                 this result tra

nsmitted



                                                                 reference range

: <=0.5.



                                                                 The reference r

zhen was



                                                                 not used to int

erpret



                                                                 this result as



                                                                 normal/abnormal

.



[facility] CenterXqzekykSLSDBIVCZI9444-94-10 00:20:00





             Test Item    Value        Reference Range Interpretation Comments

 

             Basophils # (test code 0.1          See_Comment                [Aut

omated message] The



             = Basophils #)                                        system which 

generated



                                                                 this result tra

nsmitted



                                                                 reference range

: <=0.2.



                                                                 The reference r

zhen was



                                                                 not used to int

erpret



                                                                 this result as



                                                                 normal/abnormal

.



[facility]zq[facility]otAFWNWOSGPV9609[facility]37-66 [facility]





             Test Item    Value        Reference Range Interpretation Comments

 

             C-REACTIVE PROTEIN (test code = 13.2                               

    



             C-REACTIVE PROTEIN)                                        



[facility] WITH [id number] 04:47:32





             Test Item    Value        Reference Range Interpretation Comments

 

             WBC (test code =              See_Comment  H             [Automated



             6690-2)                                             message] The sy

stem



                                                                 which generated



                                                                 this result



                                                                 transmitted



                                                                 reference range

:



                                                                 4.20 - 10.70



                                                                 10*3/?L. The



                                                                 reference range

 was



                                                                 not used to



                                                                 interpret this



                                                                 result as



                                                                 normal/abnormal

.

 

             RBC (test code =              See_Comment                [Automated



             789-8)                                              message] The sy

stem



                                                                 which generated



                                                                 this result



                                                                 transmitted



                                                                 reference range

:



                                                                 4.26 - 5.52



                                                                 10*6/?L. The



                                                                 reference range

 was



                                                                 not used to



                                                                 interpret this



                                                                 result as



                                                                 normal/abnormal

.

 

             HGB (test code = 16.1 g/dL    12.2-16.4                 



             718-7)                                              

 

             HCT (test code = 47.2 %       38.4-49.3                 



             4544-3)                                             

 

             MCV (test code = 86.1 fL      81.7-95.6                 



             787-2)                                              

 

             MCH (test code = 29.4 pg      26.1-32.7                 



             785-6)                                              

 

             MCHC (test code = 34.1 g/dL    31.2-35.0                 



             786-4)                                              

 

             RDW-SD (test code = 45.7 fL      38.5-51.6                 



             15254-2)                                            

 

             RDW-CV (test code = 14.6 %       12.1-15.4                 



             788-0)                                              

 

             PLT (test code =              See_Comment                [Automated



             777-3)                                              message] The sy

stem



                                                                 which generated



                                                                 this result



                                                                 transmitted



                                                                 reference range

:



                                                                 150 - 328 10*3/

?L.



                                                                 The reference r

zhen



                                                                 was not used to



                                                                 interpret this



                                                                 result as



                                                                 normal/abnormal

.

 

             MPV (test code = 11.0 fL      9.8-13.0                  



             91939-8)                                            

 

             NRBC/100 WBC (test              See_Comment                [Automat

ed



             code = 3430034339)                                        message] 

The system



                                                                 which generated



                                                                 this result



                                                                 transmitted



                                                                 reference range

:



                                                                 0.0 - 10.0 /100



                                                                 WBCs. The refer

ence



                                                                 range was not u

sed



                                                                 to interpret th

is



                                                                 result as



                                                                 normal/abnormal

.

 

             NRBC x10^3 (test code <0.01        See_Comment                [Auto

mated



             = [id number])                                        message] The s

ystem



                                                                 which generated



                                                                 this result



                                                                 transmitted



                                                                 reference range

:



                                                                 10*3/?L. The



                                                                 reference range

 was



                                                                 not used to



                                                                 interpret this



                                                                 result as



                                                                 normal/abnormal

.

 

             GRAN MAT (NEUT) % 72.2 %                                 



             (test code = 770-8)                                        

 

             IMM GRAN % (test code 0.60 %                                 



             = 8558487071)                                        

 

             LYMPH % (test code = 17.7 %                                 



             736-9)                                              

 

             MONO % (test code = 7.4 %                                  



             5905-5)                                             

 

             EOS % (test code = 1.8 %                                  



             713-8)                                              

 

             BASO % (test code = 0.3 %                                  



             706-2)                                              

 

             GRAN MAT x10^3(ANC) 9.09 10*3/uL 1.99-6.95    H            



             (test code =                                        



             4980945575)                                         

 

             IMM GRAN x10^3 (test 0.07 10*3/uL 0.00-0.06    H            



             code = 9872023261)                                        

 

             LYMPH x10^3 (test code 2.22 10*3/uL 1.09-3.23                 



             = 731-0)                                            

 

             MONO x10^3 (test code 0.93 10*3/uL 0.36-1.02                 



             = 742-7)                                            

 

             EOS x10^3 (test code = 0.22 10*3/uL 0.06-0.53                 



             711-2)                                              

 

             BASO x10^3 (test code 0.04 10*3/uL 0.01-0.09                 



             = 704-7)                                            

 

             Lab Interpretation Abnormal                               



             (test code = 02024-0)                                        



[facility]. METABOLIC PANEL ([id number])2022 
04:36:41





             Test Item    Value        Reference Range Interpretation Comments

 

             NA (test code = 138 mmol/L   135-145                   



             0959347458)                                         

 

             K (test code = 4.6 mmol/L   3.5-5.0                   



             9711816779)                                         

 

             CL (test code = 105 mmol/L                       



             7964551758)                                         

 

             CO2 TOTAL (test code = 21 mmol/L    23-31        L            



             6934228071)                                         

 

             AGAP (test code =              2-16                      



             7222216976)                                         

 

             BUN (test code = 13 mg/dL     7-23                      



             0917227742)                                         

 

             GLUCOSE (test code = 167 mg/dL           H            



             9448382045)                                         

 

             CREATININE (test code = 0.48 mg/dL   0.60-1.25    L            



             1182522119)                                         

 

             TOTAL BILI (test code = 0.8 mg/dL    0.1-1.1                   



             6394014120)                                         

 

             CALCIUM (test code = 9.3 mg/dL    8.6-10.6                  



             9805285989)                                         

 

             T PROTEIN (test code = 7.6 g/dL     6.3-8.2                   



             0757552821)                                         

 

             ALBUMIN (test code = 4.2 g/dL     3.5-5.0                   



             9933188117)                                         

 

             ALK PHOS (test code = 98 U/L                           



             3636442409)                                         

 

             ALTv (test code = 71 U/L       5-50         H            



             1742-6)                                             

 

             AST(SGOT) (test code = 36 U/L       13-40                     



             9892032877)                                         

 

             eGFR (test code =              mL/min/1.73m2              



             2707621642)                                         

 

             MAL (test code = MAL) Association of                           



                          Glomerular Filtration                           



                          Rate (GFR) and Staging                           



                          of Kidney Disease*                           



                          +---------------------                           



                          --+-------------------                           



                          --+-------------------                           



                          ------+| GFR                           



                          (mL/min/1.73 m2) ?|                           



                          With Kidney Damage ?|                           



                          ?Without Kidney                           



                          Damage+---------------                           



                          --------+-------------                           



                          --------+-------------                           



                          ------------+| ?>90 ?                           



                          ? ? ? ? ? ? ? ?|                           



                          ?Stage one ? ? ? ? ?|                           



                          ? Normal ? ? ? ? ? ? ?                           



                          ?+--------------------                           



                          ---+------------------                           



                          ---+------------------                           



                          -------+| ?60-89 ? ? ?                           



                          ? ? ? ? ?| ?Stage two                           



                          ? ? ? ? ?| ? Decreased                           



                          GFR ? ? ? ?                            



                          +---------------------                           



                          --+-------------------                           



                          --+-------------------                           



                          ------+| ?30-59 ? ? ?                           



                          ? ? ? ? ?| ?Stage                           



                          three ? ? ? ?| ? Stage                           



                          three ? ? ? ? ?                           



                          +---------------------                           



                          --+-------------------                           



                          --+-------------------                           



                          ------+| ?15-29 ? ? ?                           



                          ? ? ? ? ?| ?Stage four                           



                          ? ? ? ? | ? Stage four                           



                          ? ? ? ? ?                              



                          ?+--------------------                           



                          ---+------------------                           



                          ---+------------------                           



                          -------+| ?<15 (or                           



                          dialysis) ? ?| ?Stage                           



                          five ? ? ? ? | ? Stage                           



                          five ? ? ? ? ?                           



                          ?+--------------------                           



                          ---+------------------                           



                          ---+------------------                           



                          -------+ *Each stage                           



                          assumes the associated                           



                          GFR level has been in                           



                          effect for at least                           



                          three months. ?Stages                           



                          1 to 5, with or                           



                          without kidney                           



                          disease, indicate                           



                          chronic kidney                           



                          disease. Notes:                           



                          Determination of                           



                          stages one and two                           



                          (with eGFR                             



                          >59mL/min/1.73 m2)                           



                          requires estimation of                           



                          kidney damage for at                           



                          least three months as                           



                          defined by structural                           



                          or functional                           



                          abnormalities of the                           



                          kidney, manifested by                           



                          either:Pathological                           



                          abnormalities or                           



                          Markers of kidney                           



                          damage (including                           



                          abnormalities in the                           



                          composition of the                           



                          blood or urine or                           



                          abnormalities in                           



                          imaging tests).                           

 

             Lab Interpretation Abnormal                               



             (test code = 95335-8)                                        



[facility]





             Test Item    Value        Reference Range Interpretation Comments

 

             MAGNESIUM (test code = 2607286249) 1.6 mg/dL    1.7-2.4      L     

       

 

             Lab Interpretation (test code = Abnormal                           

    



             25863-9)                                            



[facility] [id number] 07:52:00





             Test Item    Value        Reference Range Interpretation Comments

 

             ABO/Rh (test code = ABO/Rh) AB POS                                 



[facility] [employer] [id number] 07:52:00





             Test Item    Value        Reference Range Interpretation Comments

 

             Antibody Scrn (test Negative (3/28/22 2:52                         

  



             code = Antibody Scrn) AM)                                    



[employer] ITYSS2014-12-33 07:52:00





             Test Item    Value        Reference Range Interpretation Comments

 

             Glucose Lvl (test code = Glucose Lvl) 291          70-99           

          



[employer]2022-03-28 07:52:00





             Test Item    Value        Reference Range Interpretation Comments

 

             BUN (test code = BUN) 16           7-22                      



[place] 427312-03-28 07:52:00





             Test Item    Value        Reference Range Interpretation Comments

 

             Creatinine Lvl (test code = Creatinine 0.83         0.50-1.40      

           



             Lvl)                                                



[place] 427312-03-28 07:52:00





             Test Item    Value        Reference Range Interpretation Comments

 

             Sodium Lvl (test code = Sodium Lvl) 138          135-145           

        



[place] 427312-03-28 07:52:00





             Test Item    Value        Reference Range Interpretation Comments

 

             Potassium Lvl (test code = Potassium 4.7          3.5-5.1          

         



             Lvl)                                                



[place] 427312-03-28 07:52:00





             Test Item    Value        Reference Range Interpretation Comments

 

             Chloride Lvl (test code = Chloride Lvl) 113                  

            



[place] 427312-03-28 07:52:00





             Test Item    Value        Reference Range Interpretation Comments

 

             CO2 (test code = CO2) 18           24-32                     



[place] 427312-03-28 07:52:00





             Test Item    Value        Reference Range Interpretation Comments

 

             AGAP (test code = AGAP) 11.7         10.0-20.0                 



[place] 427312-03-28 07:52:00





             Test Item    Value        Reference Range Interpretation Comments

 

             Calcium Lvl (test code = Calcium Lvl) 9.4          8.5-10.5        

          



[place] 427312-03-28 07:52:00





             Test Item    Value        Reference Range Interpretation Comments

 

             eGFR (test code = eGFR) 113                                    



[place] 822712-03-28 07:52:00





             Test Item    Value        Reference Range Interpretation Comments

 

             WBC (test code = WBC) 5.5          3.7-10.4                  



[place] 34950-03-28 07:52:00





             Test Item    Value        Reference Range Interpretation Comments

 

             RBC (test code = RBC) 5.53         4.70-6.10                 



[place] [address]-03-28 07:52:00





             Test Item    Value        Reference Range Interpretation Comments

 

             Hgb (test code = Hgb) 16.3         14.0-18.0                 



[place] [address]-03-28 07:52:00





             Test Item    Value        Reference Range Interpretation Comments

 

             Hct (test code = Hct) 50.5         42.0-54.0                 



[place] [address]-03-28 07:52:00





             Test Item    Value        Reference Range Interpretation Comments

 

             MCV (test code = MCV) 91.3         80.0-94.0                 



[place] [address]-03-28 07:52:00





             Test Item    Value        Reference Range Interpretation Comments

 

             MCH (test code = MCH) 29.5 pg      27.0-31.0                 



[place] 822712-03-28 07:52:00





             Test Item    Value        Reference Range Interpretation Comments

 

             MCHC (test code = MCHC) 32.3         32.0-36.0                 



[place] 822712-03-28 07:52:00





             Test Item    Value        Reference Range Interpretation Comments

 

             RDW (test code = RDW) 15.4         11.5-14.5                 



[place] 822712-03-28 07:52:00





             Test Item    Value        Reference Range Interpretation Comments

 

             Platelet (test code = Platelet) 210          133-450               

    



[place] 822712-03-28 07:52:00





             Test Item    Value        Reference Range Interpretation Comments

 

             MPV (test code = MPV) 9.3          7.4-10.4                  



[facility]2022-03-27 10:12:00





             Test Item    Value        Reference Range Interpretation Comments

 

             Glucose Lvl (test code = Glucose Lvl) 115          70-99           

          



[facility]2022-03-27 10:12:00





             Test Item    Value        Reference Range Interpretation Comments

 

             BUN (test code = BUN) 18           7-22                      



[facility]2022-03-27 10:12:00





             Test Item    Value        Reference Range Interpretation Comments

 

             Creatinine Lvl (test code = Creatinine 0.78         0.50-1.40      

           



             Lvl)                                                



[facility]2022-03-27 10:12:00





             Test Item    Value        Reference Range Interpretation Comments

 

             Sodium Lvl (test code = Sodium Lvl) 138          135-145           

        



[place] 427312-03-27 10:12:00





             Test Item    Value        Reference Range Interpretation Comments

 

             Potassium Lvl (test code = Potassium 4.6          3.5-5.1          

         



             Lvl)                                                



[place] 427312-03-27 10:12:00





             Test Item    Value        Reference Range Interpretation Comments

 

             Chloride Lvl (test code = Chloride Lvl) 111                  

            



[place] 427312-03-27 10:12:00





             Test Item    Value        Reference Range Interpretation Comments

 

             CO2 (test code = CO2) 21           24-32                     



[place] 427312-03-27 10:12:00





             Test Item    Value        Reference Range Interpretation Comments

 

             AGAP (test code = AGAP) 10.6         10.0-20.0                 



[facility]2022-03-27 10:12:00





             Test Item    Value        Reference Range Interpretation Comments

 

             Calcium Lvl (test code = Calcium Lvl) 8.6          8.5-10.5        

          



[facility]2022-03-27 10:12:00





             Test Item    Value        Reference Range Interpretation Comments

 

             eGFR (test code = eGFR) 116                                    



[facility] [id number] 10:12:00





             Test Item    Value        Reference Range Interpretation Comments

 

             WBC (test code = WBC) 8.2          3.7-10.4                  



[place] 822712-03-27 10:12:00





             Test Item    Value        Reference Range Interpretation Comments

 

             RBC (test code = RBC) 5.14         4.70-6.10                 



[facility] [id number] 10:12:00





             Test Item    Value        Reference Range Interpretation Comments

 

             Hgb (test code = Hgb) 15.5         14.0-18.0                 



[facility] CenterOaomkytWXURUXCMYQ5270-42-92 10:12:00





             Test Item    Value        Reference Range Interpretation Comments

 

             Hct (test code = Hct) 46.0         42.0-54.0                 



[facility] CenterXvypjguJUHQQBSPNR1713-19-54 10:12:00





             Test Item    Value        Reference Range Interpretation Comments

 

             MCV (test code = MCV) 89.5         80.0-94.0                 



[place] 822712-03-27 10:12:00





             Test Item    Value        Reference Range Interpretation Comments

 

             MCH (test code = MCH) 30.2 pg      27.0-31.0                 



[facility] CenterPwovwmeEORTGBJYEP6091-28-24 10:12:00





             Test Item    Value        Reference Range Interpretation Comments

 

             MCHC (test code = MCHC) 33.8         32.0-36.0                 



[place] 822712-03-27 10:12:00





             Test Item    Value        Reference Range Interpretation Comments

 

             RDW (test code = RDW) 15.3         11.5-14.5                 



[place] 822712-03-27 10:12:00





             Test Item    Value        Reference Range Interpretation Comments

 

             Platelet (test code = Platelet) 358          133-450               

    



[facility] CenterOlpwckwVICSQSBKGP3379-09-37 10:12:00





             Test Item    Value        Reference Range Interpretation Comments

 

             MPV (test code = MPV) 8.3          7.4-10.4                  



[place] 427312-03-27 06:53:00





             Test Item    Value        Reference Range Interpretation Comments

 

             Glucose Lvl (test code = Glucose Lvl) 167          70-99           

          



[place] 427312-03-27 06:53:00





             Test Item    Value        Reference Range Interpretation Comments

 

             BUN (test code = BUN) 16           7-22                      



[place] 427312-03-27 06:53:00





             Test Item    Value        Reference Range Interpretation Comments

 

             Creatinine Lvl (test code = Creatinine 0.99         0.50-1.40      

           



             Lvl)                                                



[place] 427312-03-27 06:53:00





             Test Item    Value        Reference Range Interpretation Comments

 

             Sodium Lvl (test code = Sodium Lvl) 141          135-145           

        



[place] 427312-03-27 06:53:00





             Test Item    Value        Reference Range Interpretation Comments

 

             Potassium Lvl (test code = Potassium 4.5          3.5-5.1          

         



             Lvl)                                                



[place] 427312-03-27 06:53:00





             Test Item    Value        Reference Range Interpretation Comments

 

             Chloride Lvl (test code = Chloride Lvl) 111                  

            



[facility]2022-03-27 06:53:00





             Test Item    Value        Reference Range Interpretation Comments

 

             CO2 (test code = CO2) 22           24-32                     



[place] 427312-03-27 06:53:00





             Test Item    Value        Reference Range Interpretation Comments

 

             AGAP (test code = AGAP) 12.5         10.0-20.0                 



[place] 427312-03-27 06:53:00





             Test Item    Value        Reference Range Interpretation Comments

 

             Calcium Lvl (test code = Calcium Lvl) 8.6          8.5-10.5        

          



[facility]2022-03-27 06:53:00





             Test Item    Value        Reference Range Interpretation Comments

 

             eGFR (test code = eGFR) 98                                     



[facility]NIT[facility]SUSC:PT:ISOLATE:ORDQN:[id number] 22:59:00





             Test Item    Value        Reference Range Interpretation Comments

 

             Culture: Urine (test >100,000 CFU/mL Proteus                       

    



             code = Culture: mirabilis >100,000 CFU/mL                          

 



             Urine)       Providencia stuartii                           



[facility]AaronOFURANTOIN:SUSC:PT:ISOLATE:ORDQN:[id number] 22:59:00





             Test Item    Value        Reference Range Interpretation Comments

 

             Providencia stuartii Providencia stuartii                          

 



             (test code = Providencia                                        



             stuartii)                                           



[facility] CenterNIT[facility]URANT[facility]IN:SUSC:PT:ISOLATE:ORDQN:[id number] 22:59:00





             Test Item    Value        Reference Range Interpretation Comments

 

             Proteus mirabilis (test Proteus mirabilis                          

 



             code = Proteus mirabilis)                                        



[facility] CenterCulture: [id number] 22:59:00





             Test Item    Value        Reference Range Interpretation Comments

 

             Culture: Urine (test Holding For Better                           



             code = Culture: Urine) Growth                                 



[facility] CenterTourjive [id number] 21:03:00





             Test Item    Value        Reference Range Interpretation Comments

 

             Glucose Lvl (test code = Glucose Lvl) 126          70-99           

          



[facility] CenterTour[facility] [id number] 21:03:00





             Test Item    Value        Reference Range Interpretation Comments

 

             BUN (test code = BUN) 16           7-22                      



[facility] CenterTour[facility] [id number] 21:03:00





             Test Item    Value        Reference Range Interpretation Comments

 

             Creatinine Lvl (test code = Creatinine 0.76         0.50-1.40      

           



             Lvl)                                                



[facility]hamWalld[facility] [id number] 21:03:00





             Test Item    Value        Reference Range Interpretation Comments

 

             Sodium Lvl (test code = Sodium Lvl) 140          135-145           

        



[facility] Center[facility] [id number] 21:03:00





             Test Item    Value        Reference Range Interpretation Comments

 

             Potassium Lvl (test code = Potassium 3.3          3.5-5.1          

         



             Lvl)                                                



[facility] Center[facility] [id number] 21:03:00





             Test Item    Value        Reference Range Interpretation Comments

 

             Chloride Lvl (test code = Chloride Lvl) 111                  

            



[facility] [id number] 21:03:00





             Test Item    Value        Reference Range Interpretation Comments

 

             CO2 (test code = CO2) 22           24-32                     



[facility] CenterTour[facility] [id number] 21:03:00





             Test Item    Value        Reference Range Interpretation Comments

 

             Calcium Lvl (test code = Calcium Lvl) 8.5          8.5-10.5        

          



[facility] Center[facility] [id number] 21:03:00





             Test Item    Value        Reference Range Interpretation Comments

 

             AGAP (test code = AGAP) 10.3         10.0-20.0                 



[place] 427312-03-26 21:03:00





             Test Item    Value        Reference Range Interpretation Comments

 

             eGFR (test code = eGFR) 117                                    



[place] 427312-03-26 21:03:00





             Test Item    Value        Reference Range Interpretation Comments

 

             Lactic Acid Lvl (test code = Lactic 1.1          0.5-2.2           

        



             Acid Lvl)                                           



[place] 822712-03-26 21:03:00





             Test Item    Value        Reference Range Interpretation Comments

 

             Segs (test code = Segs) 68.9         45.0-75.0                 



[place] 822712-03-26 21:03:00





             Test Item    Value        Reference Range Interpretation Comments

 

             Lymphocytes (test code = Lymphocytes) 20.4         20.0-40.0       

          



[place] 822712-03-26 21:03:00





             Test Item    Value        Reference Range Interpretation Comments

 

             Monocytes (test code = Monocytes) 8.2          2.0-12.0            

      



[place] 822712-03-26 21:03:00





             Test Item    Value        Reference Range Interpretation Comments

 

             Eosinophils (test code = 2.2          See_Comment                [A

utomated message] The



             Eosinophils)                                        system which ge

nerated



                                                                 this result tra

nsmitted



                                                                 reference range

: <=4.0.



                                                                 The reference r

zhen was



                                                                 not used to int

erpret



                                                                 this result as



                                                                 normal/abnormal

.



[place] [address]-03-26 21:03:00





             Test Item    Value        Reference Range Interpretation Comments

 

             Basophils (test code = 0.3          See_Comment                [Aut

omated message] The



             Basophils)                                          system which ge

nerated



                                                                 this result tra

nsmitted



                                                                 reference range

: <=1.0.



                                                                 The reference r

zhen was



                                                                 not used to int

erpret



                                                                 this result as



                                                                 normal/abnormal

.



[place] 822712-03-26 21:03:00





             Test Item    Value        Reference Range Interpretation Comments

 

             Neutrophils # (test code = Neutrophils 5.5          1.5-8.1        

           



             #)                                                  



[place] 822712-03-26 21:03:00





             Test Item    Value        Reference Range Interpretation Comments

 

             Lymphocytes # (test code = Lymphocytes 1.6          1.0-5.5        

           



             #)                                                  



[place] 822712-03-26 21:03:00





             Test Item    Value        Reference Range Interpretation Comments

 

             Monocytes # (test code 0.7          See_Comment                [Aut

omated message] The



             = Monocytes #)                                        system which 

generated



                                                                 this result tra

nsmitted



                                                                 reference range

: <=0.8.



                                                                 The reference r

zhen was



                                                                 not used to int

erpret



                                                                 this result as



                                                                 normal/abnormal

.



[facility] [id number] 21:03:00





             Test Item    Value        Reference Range Interpretation Comments

 

             Eosinophils # (test code 0.2          See_Comment                [A

utomated message] The



             = Eosinophils #)                                        system whic

h generated



                                                                 this result tra

nsmitted



                                                                 reference range

: <=0.5.



                                                                 The reference r

zhen was



                                                                 not used to int

erpret



                                                                 this result as



                                                                 normal/abnormal

.



[facility] [id number] 21:03:00





             Test Item    Value        Reference Range Interpretation Comments

 

             WBC (test code = WBC) 8.0          3.7-10.4                  



[facility] CenterCmrheajHKAWTUJDEW7120-02-12 21:03:00





             Test Item    Value        Reference Range Interpretation Comments

 

             RBC (test code = RBC) 5.37         4.70-6.10                 



[facility] [id number] 21:03:00





             Test Item    Value        Reference Range Interpretation Comments

 

             Hgb (test code = Hgb) 15.9         14.0-18.0                 



[facility] Cent[id number] 21:03:00





             Test Item    Value        Reference Range Interpretation Comments

 

             Hct (test code = Hct) 47.3         42.0-54.0                 



[facility] CenterQacaktkLJVNZCLXDY9058-08-30 21:03:00





             Test Item    Value        Reference Range Interpretation Comments

 

             MCV (test code = MCV) 88.1         80.0-94.0                 



[facility] [id number] 21:03:00





             Test Item    Value        Reference Range Interpretation Comments

 

             MCH (test code = MCH) 29.6 pg      27.0-31.0                 



[facility] C[id number] 21:03:00





             Test Item    Value        Reference Range Interpretation Comments

 

             MCHC (test code = MCHC) 33.6         32.0-36.0                 



[facility] [id number] 21:03:00





             Test Item    Value        Reference Range Interpretation Comments

 

             RDW (test code = RDW) 15.3         11.5-14.5                 



[place] 822712-03-26 21:03:00





             Test Item    Value        Reference Range Interpretation Comments

 

             Platelet (test code = Platelet) 370          133-450               

    



[facility] [id number] 21:03:00





             Test Item    Value        Reference Range Interpretation Comments

 

             MPV (test code = MPV) 8.1          7.4-10.4                  



[facility] AND [id number] 21:03:00





             Test Item    Value        Reference Range Interpretation Comments

 

             UA Comment 1 (test Suboptimal specimen                           



             code = UA Comment 1) received. Results may be                      

     



                          inaccurate due to the                           



                          age of the specimen.                           



                          Interpret results with                           



                          caution.                               



[facility] AND [id number] 21:03:00





             Test Item    Value        Reference Range Interpretation Comments

 

             UA Color (test code = Yellow *NA*(3/26/22                          

 



             UA Color)    4:03 PM)                               



[facility] AND [id number] 21:03:00





             Test Item    Value        Reference Range Interpretation Comments

 

             UA Turbidity (test code Slight *ABN*(3/26/22                       

    



             = UA Turbidity) 4:03 PM)                               



[facility] AND [id number] 21:03:00





             Test Item    Value        Reference Range Interpretation Comments

 

             UA Spec Grav (test code = UA Spec 1.015 1                          

      



             Grav)                                               



[facility] AND [id number] 21:03:00





             Test Item    Value        Reference Range Interpretation Comments

 

             UA pH (test code = UA pH) 5.0 1        5.0-8.0                   



[facility] AND [id number] 21:03:00





             Test Item    Value        Reference Range Interpretation Comments

 

             UA Protein (test code = UA Negative mg/dL                          

 



             Protein)                                            



[facility] AND [id number] 21:03:00





             Test Item    Value        Reference Range Interpretation Comments

 

             UA Glucose (test code = UA Negative mg/dL                          

 



             Glucose)                                            



[facility] AND [id number] 21:03:00





             Test Item    Value        Reference Range Interpretation Comments

 

             UA Ketones (test code = UA Negative mg/dL                          

 



             Ketones)                                            



[facility] AND [id number] 21:03:00





             Test Item    Value        Reference Range Interpretation Comments

 

             UA Bili (test code = Negative *NA*(3/26/22                         

  



             UA Bili)     4:03 PM)                               



[facility] AND [id number] 21:03:00





             Test Item    Value        Reference Range Interpretation Comments

 

             UA Blood (test code = Negative (3/26/22 4:03                       

    



             UA Blood)    PM)                                    



[facility] AND [id number] 21:03:00





             Test Item    Value        Reference Range Interpretation Comments

 

             UA Urobilinogen (test code = UA no gt        0.1-1.0               

    



             Urobilinogen)                                        



[facility] AND [id number] 21:03:00





             Test Item    Value        Reference Range Interpretation Comments

 

             UA Nitrite (test code Negative (3/26/22 4:03                       

    



             = UA Nitrite) PM)                                    



[facility] AND [id number] 21:03:00





             Test Item    Value        Reference Range Interpretation Comments

 

             UA Leuk Est (test code Large *ABN*(3/26/22                         

  



             = UA Leuk Est) 4:03 PM)                               



[facility] AND [id number] 21:03:00





             Test Item    Value        Reference Range Interpretation Comments

 

             UA WBC (test code = 64           See_Comment                [Automa

huma message] The



             UA WBC)                                             system which ge

nerated this



                                                                 result transmit

huma



                                                                 reference range

: <=5. The



                                                                 reference range

 was not



                                                                 used to interpr

et this



                                                                 result as zayda

l/abnormal.



[facility] AND [id number] 21:03:00





             Test Item    Value        Reference Range Interpretation Comments

 

             UA RBC (test code = 2            See_Comment                [Automa

huma message] The



             UA RBC)                                             system which ge

nerated this



                                                                 result transmit

huma



                                                                 reference range

: <=2. The



                                                                 reference range

 was not



                                                                 used to interpr

et this



                                                                 result as zayda

l/abnormal.



[facility] AND [id number] 21:03:00





             Test Item    Value        Reference Range Interpretation Comments

 

             UA Mucus (test code = UA Mucus) Few /LPF                           

    



[facility] AND [id number] 21:03:00





             Test Item    Value        Reference Range Interpretation Comments

 

             UA Sq Epi (test code = UA Sq Epi) None Seen                        

      



[facility] [id number] 09:58:00





             Test Item    Value        Reference Range Interpretation Comments

 

             Lactic Acid Lvl (test code = Lactic 2.4          0.5-2.2           

        



             Acid Lvl)                                           



[facility]148[facility]58[facility]





             Test Item    Value        Reference Range Interpretation Comments

 

             Sed Rate (test code = 13           See_Comment                [Auto

mated message] The



             Sed Rate)                                           system which ge

nerated this



                                                                 result transmit

huma



                                                                 reference range

: <=15. The



                                                                 reference range

 was not



                                                                 used to interpr

et this



                                                                 result as zayda

l/abnormal.



[facility]lwqopyEQLGTSRYBF0344-19-76 09:58:00





             Test Item    Value        Reference Range Interpretation Comments

 

             C-REACTIVE PROTEIN (test code = 10.6                               

    



             C-REACTIVE PROTEIN)                                        



[facility] [id number] 18:36:00





             Test Item    Value        Reference Range Interpretation Comments

 

             Protein CSF (test code = Protein CSF) 14           15-45           

          



[facility] [id number] 18:36:00





             Test Item    Value        Reference Range Interpretation Comments

 

             Glucose CSF (test code = Glucose CSF) 67           45-80           

          



[facility]2022-03-25 18:36:00





             Test Item    Value        Reference Range Interpretation Comments

 

             Tube Num CSF (test xxxxxxx (3/25/22 1:36                           



             code = Tube Num CSF) PM)                                    



[facility] [id number] 18:36:00





             Test Item    Value        Reference Range Interpretation Comments

 

             Color CSF (test code Colorless (3/25/22 1:36                       

    



             = Color CSF) PM)                                    



[facility]2022-03-25 18:36:00





             Test Item    Value        Reference Range Interpretation Comments

 

             Clarity CSF (test code = Clear (3/25/22 1:36                       

    



             Clarity CSF) PM)                                    



[facility]2022-03-25 18:36:00





             Test Item    Value        Reference Range Interpretation Comments

 

             Supernat CSF (test Colorless (3/25/22 1:36                         

  



             code = Supernat CSF) PM)                                    



[facility]2022-03-25 18:36:00





             Test Item    Value        Reference Range Interpretation Comments

 

             Nucleated Cells CSF 0            See_Comment                [Automa

huma message] The



             (test code = Nucleated                                        syste

m which generated



             Cells CSF)                                          this result tra

nsmitted



                                                                 reference range

: <=53.



                                                                 The reference r

zhen was



                                                                 not used to int

erpret



                                                                 this result as



                                                                 normal/abnormal

.



[facility] [id number] 18:36:00





             Test Item    Value        Reference Range Interpretation Comments

 

             RBC CSF (test code = 0            See_Comment                [Autom

ated message] The



             RBC CSF)                                            system which ge

nerated this



                                                                 result transmit

huma



                                                                 reference range

: <=03. The



                                                                 reference range

 was not



                                                                 used to interpr

et this



                                                                 result as zayda

l/abnormal.



[facility] [id number] 18:36:00





             Test Item    Value        Reference Range Interpretation Comments

 

             Comment CSF (test Differential not                           



             code = Comment CSF) performed on WBC count of                      

     



                          less than 5.                           



[facility] [id number] 18:36:00





             Test Item    Value        Reference Range Interpretation Comments

 

             Gram Stain Report Gram Stain Performed By:                         

  



             (test code = [facility] [place]                           



             Stain Report) [facility] w/Gram [id number] 18:36:00





             Test Item    Value        Reference Range Interpretation Comments

 

             Culture: CSF w/Gram Stain (test No Growth                          

    



             code = Culture: CSF w/Gram Stain)                                  

      



[facility] CenterYjbeugvUXQFFNOLGY3215-07-64 08:28:00





             Test Item    Value        Reference Range Interpretation Comments

 

             Coronavirus (COVID-19) Not Detected (3/25/22                       

    



             ZEYAD (test code = 3:28 AM)                               



             Coronavirus (COVID-19)                                        



             ZEYAD)                                                



[facility] [id number] 06:48:00





             Test Item    Value        Reference Range Interpretation Comments

 

             Antibody Scrn (test Negative (3/25/22 1:48                         

  



             code = Antibody Scrn) AM)                                    



[facility] [id number] 06:48:00





             Test Item    Value        Reference Range Interpretation Comments

 

             ABO/Rh (test code = ABO/Rh) AB POS                                 



[facility] CenterCrhbhgaWAIYIPBWKE6436-39-07 06:48:00





             Test Item    Value        Reference Range Interpretation Comments

 

             Segs (test code = Segs) 70.8         45.0-75.0                 



[facility] CenterPsjourjQZVDXVDCRB9304-37-83 06:48:00





             Test Item    Value        Reference Range Interpretation Comments

 

             Lymphocytes (test code = Lymphocytes) 20.8         20.0-40.0       

          



[facility] CenterRintnkrTPBZMLDKMY5963-75-29 06:48:00





             Test Item    Value        Reference Range Interpretation Comments

 

             Monocytes (test code = Monocytes) 5.8          2.0-12.0            

      



[facility] CenterN[id number] 06:48:00





             Test Item    Value        Reference Range Interpretation Comments

 

             Eosinophils (test code = 2.3          See_Comment                [A

utomated message] The



             Eosinophils)                                        system which ge

nerated



                                                                 this result tra

nsmitted



                                                                 reference range

: <=4.0.



                                                                 The reference r

zhen was



                                                                 not used to int

erpret



                                                                 this result as



                                                                 normal/abnormal

.



[facility] CenterWpcrykjOZRRYQYGQH6404-12-61 06:48:00





             Test Item    Value        Reference Range Interpretation Comments

 

             Basophils (test code = 0.3          See_Comment                [Aut

omated message] The



             Basophils)                                          system which ge

nerated



                                                                 this result tra

nsmitted



                                                                 reference range

: <=1.0.



                                                                 The reference r

zhen was



                                                                 not used to int

erpret



                                                                 this result as



                                                                 normal/abnormal

.



[facility] CenterLnmuhlcFBKSLYZKJR0159-97-36 06:48:00





             Test Item    Value        Reference Range Interpretation Comments

 

             Neutrophils # (test code = Neutrophils 8.4          1.5-8.1        

           



             #)                                                  



[facility] CenterWilmsmpEICLXAOVGZ5800-55-29 06:48:00





             Test Item    Value        Reference Range Interpretation Comments

 

             Lymphocytes # (test code = Lymphocytes 2.5          1.0-5.5        

           



             #)                                                  



[place] 822712-03-25 06:48:00





             Test Item    Value        Reference Range Interpretation Comments

 

             Monocytes # (test code 0.7          See_Comment                [Aut

omated message] The



             = Monocytes #)                                        system which 

generated



                                                                 this result tra

nsmitted



                                                                 reference range

: <=0.8.



                                                                 The reference r

zhen was



                                                                 not used to int

erpret



                                                                 this result as



                                                                 normal/abnormal

.



[place] 822712-03-25 06:48:00





             Test Item    Value        Reference Range Interpretation Comments

 

             Eosinophils # (test code 0.3          See_Comment                [A

utomated message] The



             = Eosinophils #)                                        system whic

h generated



                                                                 this result tra

nsmitted



                                                                 reference range

: <=0.5.



                                                                 The reference r

zhen was



                                                                 not used to int

erpret



                                                                 this result as



                                                                 normal/abnormal

.



[place] 822712-03-25 06:48:00





             Test Item    Value        Reference Range Interpretation Comments

 

             WBC X 10x3 (test code = WBC X 10x3) 11.9         3.7-10.4          

        



[place] 822712-03-25 06:48:00





             Test Item    Value        Reference Range Interpretation Comments

 

             RBC X 10x6 (test code = RBC X 10x6) 5.95         4.70-6.10         

        



[place] 822712-03-25 06:48:00





             Test Item    Value        Reference Range Interpretation Comments

 

             Hgb (test code = Hgb) 17.9         14.0-18.0                 



[place] 822712-03-25 06:48:00





             Test Item    Value        Reference Range Interpretation Comments

 

             Hct (test code = Hct) 52.0         42.0-54.0                 



[place] [address]-03-25 06:48:00





             Test Item    Value        Reference Range Interpretation Comments

 

             MCV (test code = MCV) 87.5         80.0-94.0                 



[place] [address]-03-25 06:48:00





             Test Item    Value        Reference Range Interpretation Comments

 

             MCH (test code = MCH) 30.2 pg      27.0-31.0                 



[place] 822712-03-25 06:48:00





             Test Item    Value        Reference Range Interpretation Comments

 

             MCHC (test code = MCHC) 34.5         32.0-36.0                 



[facility] CenterAjzydptVKNDJCVIDW4762-34-57 06:48:00





             Test Item    Value        Reference Range Interpretation Comments

 

             RDW (test code = RDW) 15.4         11.5-14.5                 



[facility]59[facility]48:00





             Test Item    Value        Reference Range Interpretation Comments

 

             Platelet (test code = Platelet) 414          133-450               

    



[facility] HospitalEnkakhmHVBTJPCJDB7531-94-94 06:48:00





             Test Item    Value        Reference Range Interpretation Comments

 

             MPV (test code = MPV) 8.5          7.4-10.4                  



[facility] CenterMlwwtmdYYENJCWSUS6087-99-90 06:48:00





             Test Item    Value        Reference Range Interpretation Comments

 

             PT (test code = PT) 12.9 s       12.0-14.7                 



[facility] HospitalXfjyypkSVWLPTQNFN6620-51-50 06:48:00





             Test Item    Value        Reference Range Interpretation Comments

 

             INR (test code = INR) 0.98 1       0.85-1.17                 



[facility] HospitalJuhafhpCCOJDXTNSJ1510-75-51 06:48:00





             Test Item    Value        Reference Range Interpretation Comments

 

             PTT (test code = PTT) 31.4 s       22.9-35.8                 



[facility] WITH [id number] 00:23:28





             Test Item    Value        Reference Range Interpretation Comments

 

             WBC (test code =              See_Comment  H             [Automated



             0290-2)                                             message] The sy

stem



                                                                 which generated



                                                                 this result



                                                                 transmitted



                                                                 reference range

:



                                                                 4.20 - 10.70



                                                                 10*3/?L. The



                                                                 reference range

 was



                                                                 not used to



                                                                 interpret this



                                                                 result as



                                                                 normal/abnormal

.

 

             RBC (test code =              See_Comment  H             [Automated



             969-8)                                              message] The sy

stem



                                                                 which generated



                                                                 this result



                                                                 transmitted



                                                                 reference range

:



                                                                 4.26 - 5.52



                                                                 10*6/?L. The



                                                                 reference range

 was



                                                                 not used to



                                                                 interpret this



                                                                 result as



                                                                 normal/abnormal

.

 

             HGB (test code = 18.3 g/dL    12.2-16.4    H            



             718-7)                                              

 

             HCT (test code = 55.6 %       38.4-49.3    H            



             4544-3)                                             

 

             MCV (test code = 89.0 fL      81.7-95.6                 



             787-2)                                              

 

             MCH (test code = 29.3 pg      26.1-32.7                 



             785-6)                                              

 

             MCHC (test code = 32.9 g/dL    31.2-35.0                 



             786-4)                                              

 

             RDW-SD (test code = 47.6 fL      38.5-51.6                 



             59979-5)                                            

 

             RDW-CV (test code = 15.1 %       12.1-15.4                 



             788-0)                                              

 

             PLT (test code =              See_Comment  H             [Automated



             777-3)                                              message] The sy

stem



                                                                 which generated



                                                                 this result



                                                                 transmitted



                                                                 reference range

:



                                                                 150 - 328 10*3/

?L.



                                                                 The reference r

zhen



                                                                 was not used to



                                                                 interpret this



                                                                 result as



                                                                 normal/abnormal

.

 

             MPV (test code = 10.5 fL      9.8-13.0                  



             39144-8)                                            

 

             NRBC/100 WBC (test              See_Comment                [Automat

ed



             code = [id number])                                        message] 

The system



                                                                 which generated



                                                                 this result



                                                                 transmitted



                                                                 reference range

:



                                                                 0.0 - 10.0 /100



                                                                 WBCs. The refer

ence



                                                                 range was not u

sed



                                                                 to interpret th

is



                                                                 result as



                                                                 normal/abnormal

.

 

             NRBC x10^3 (test code <0.01        See_Comment                [Auto

mated



             = [id number])                                        message] The s

ystem



                                                                 which generated



                                                                 this result



                                                                 transmitted



                                                                 reference range

:



                                                                 10*3/?L. The



                                                                 reference range

 was



                                                                 not used to



                                                                 interpret this



                                                                 result as



                                                                 normal/abnormal

.

 

             GRAN MAT (NEUT) % 66.7 %                                 



             (test code = 770-8)                                        

 

             IMM GRAN % (test code 0.40 %                                 



             = [id number])                                        

 

             LYMPH % (test code = 24.4 %                                 



             736-9)                                              

 

             MONO % (test code = 6.6 %                                  



             5905-5)                                             

 

             EOS % (test code = 1.5 %                                  



             713-8)                                              

 

             BASO % (test code = 0.4 %                                  



             706-2)                                              

 

             GRAN MAT x10^3(ANC) 7.43 10*3/uL 1.99-6.95    H            



             (test code =                                        



             [id number])                                         

 

             IMM GRAN x10^3 (test 0.04 10*3/uL 0.00-0.06                 



             code = 7539868178)                                        

 

             LYMPH x10^3 (test code 2.72 10*3/uL 1.09-3.23                 



             = 731-0)                                            

 

             MONO x10^3 (test code 0.74 10*3/uL 0.36-1.02                 



             = 742-7)                                            

 

             EOS x10^3 (test code = 0.17 10*3/uL 0.06-0.53                 



             711-2)                                              

 

             BASO x10^3 (test code 0.04 10*3/uL 0.01-0.09                 



             = 704-7)                                            

 

             Lab Interpretation Abnormal                               



             (test code = 61000-8)                                        



[facility]. METABOLIC PANEL ([id number] 
00:18:07





             Test Item    Value        Reference Range Interpretation Comments

 

             NA (test code = 139 mmol/L   135-145                   



             0150765918)                                         

 

             K (test code = 4.8 mmol/L   3.5-5.0                   



             9376322686)                                         

 

             CL (test code = 104 mmol/L                       



             9218536626)                                         

 

             CO2 TOTAL (test code = 22 mmol/L    23-31        L            



             0060487407)                                         

 

             AGAP (test code =              2-16                      



             5908003978)                                         

 

             BUN (test code = 15 mg/dL     7-23                      



             3639070177)                                         

 

             GLUCOSE (test code = 93 mg/dL                         



             3761047208)                                         

 

             CREATININE (test code = 0.67 mg/dL   0.60-1.25                 



             9598655024)                                         

 

             TOTAL BILI (test code = 0.7 mg/dL    0.1-1.1                   



             6165700998)                                         

 

             CALCIUM (test code = 9.8 mg/dL    8.6-10.6                  



             3726436655)                                         

 

             T PROTEIN (test code = 8.9 g/dL     6.3-8.2      H            



             9167135310)                                         

 

             ALBUMIN (test code = 4.9 g/dL     3.5-5.0                   



             6400998605)                                         

 

             ALK PHOS (test code = 126 U/L             H            



             2081587557)                                         

 

             ALTv (test code = 43 U/L       5-50                      



             1742-6)                                             

 

             AST(SGOT) (test code = 28 U/L       13-40                     



             6317948690)                                         

 

             eGFR (test code =              mL/min/1.73m2              



             4170957525)                                         

 

             MAL (test code = MAL) Association of                           



                          Glomerular Filtration                           



                          Rate (GFR) and Staging                           



                          of Kidney Disease*                           



                          +---------------------                           



                          --+-------------------                           



                          --+-------------------                           



                          ------+| GFR                           



                          (mL/min/1.73 m2) ?|                           



                          With Kidney Damage ?|                           



                          ?Without Kidney                           



                          Damage+---------------                           



                          --------+-------------                           



                          --------+-------------                           



                          ------------+| ?>90 ?                           



                          ? ? ? ? ? ? ? ?|                           



                          ?Stage one ? ? ? ? ?|                           



                          ? Normal ? ? ? ? ? ? ?                           



                          ?+--------------------                           



                          ---+------------------                           



                          ---+------------------                           



                          -------+| ?60-89 ? ? ?                           



                          ? ? ? ? ?| ?Stage two                           



                          ? ? ? ? ?| ? Decreased                           



                          GFR ? ? ? ?                            



                          +---------------------                           



                          --+-------------------                           



                          --+-------------------                           



                          ------+| ?30-59 ? ? ?                           



                          ? ? ? ? ?| ?Stage                           



                          three ? ? ? ?| ? Stage                           



                          three ? ? ? ? ?                           



                          +---------------------                           



                          --+-------------------                           



                          --+-------------------                           



                          ------+| ?15-29 ? ? ?                           



                          ? ? ? ? ?| ?Stage four                           



                          ? ? ? ? | ? Stage four                           



                          ? ? ? ? ?                              



                          ?+--------------------                           



                          ---+------------------                           



                          ---+------------------                           



                          -------+| ?<15 (or                           



                          dialysis) ? ?| ?Stage                           



                          five ? ? ? ? | ? Stage                           



                          five ? ? ? ? ?                           



                          ?+--------------------                           



                          ---+------------------                           



                          ---+------------------                           



                          -------+ *Each stage                           



                          assumes the associated                           



                          GFR level has been in                           



                          effect for at least                           



                          three months. ?Stages                           



                          1 to 5, with or                           



                          without kidney                           



                          disease, indicate                           



                          chronic kidney                           



                          disease. Notes:                           



                          Determination of                           



                          stages one and two                           



                          (with eGFR                             



                          >59mL/min/1.73 m2)                           



                          requires estimation of                           



                          kidney damage for at                           



                          least three months as                           



                          defined by structural                           



                          or functional                           



                          abnormalities of the                           



                          kidney, manifested by                           



                          either:Pathological                           



                          abnormalities or                           



                          Markers of kidney                           



                          damage (including                           



                          abnormalities in the                           



                          composition of the                           



                          blood or urine or                           



                          abnormalities in                           



                          imaging tests).                           

 

             Lab Interpretation Abnormal                               



             (test code = 90359-2)                                        



[facility]. METABOLIC PANEL ([id number] 
12:48:40





             Test Item    Value        Reference Range Interpretation Comments

 

             NA (test code = 137 mmol/L   135-145                   



             3030693950)                                         

 

             K (test code = 4.5 mmol/L   3.5-5.0                   



             4675375780)                                         

 

             CL (test code = 107 mmol/L                       



             6136034940)                                         

 

             CO2 TOTAL (test code = 24 mmol/L    23-31                     



             1092597061)                                         

 

             AGAP (test code =              2-16                      



             1648190694)                                         

 

             BUN (test code = 16 mg/dL     7-23                      



             5269402078)                                         

 

             GLUCOSE (test code = 116 mg/dL           H            



             4894826587)                                         

 

             CREATININE (test code = 0.62 mg/dL   0.60-1.25                 



             9932905095)                                         

 

             TOTAL BILI (test code = 0.4 mg/dL    0.1-1.1                   



             7485792169)                                         

 

             CALCIUM (test code = 8.7 mg/dL    8.6-10.6                  



             0306277281)                                         

 

             T PROTEIN (test code = 7.5 g/dL     6.3-8.2                   



             9969038042)                                         

 

             ALBUMIN (test code = 3.9 g/dL     3.5-5.0                   



             8775787218)                                         

 

             ALK PHOS (test code = 93 U/L                           



             4272483917)                                         

 

             ALTv (test code = 40 U/L       5-50                      



             1742-6)                                             

 

             AST(SGOT) (test code = 51 U/L       13-40        H            



             4075439426)                                         

 

             eGFR (test code =              mL/min/1.73m2              



             6026411468)                                         

 

             MAL (test code = MAL) Association of                           



                          Glomerular Filtration                           



                          Rate (GFR) and Staging                           



                          of Kidney Disease*                           



                          +---------------------                           



                          --+-------------------                           



                          --+-------------------                           



                          ------+| GFR                           



                          (mL/min/1.73 m2) ?|                           



                          With Kidney Damage ?|                           



                          ?Without Kidney                           



                          Damage+---------------                           



                          --------+-------------                           



                          --------+-------------                           



                          ------------+| ?>90 ?                           



                          ? ? ? ? ? ? ? ?|                           



                          ?Stage one ? ? ? ? ?|                           



                          ? Normal ? ? ? ? ? ? ?                           



                          ?+--------------------                           



                          ---+------------------                           



                          ---+------------------                           



                          -------+| ?60-89 ? ? ?                           



                          ? ? ? ? ?| ?Stage two                           



                          ? ? ? ? ?| ? Decreased                           



                          GFR ? ? ? ?                            



                          +---------------------                           



                          --+-------------------                           



                          --+-------------------                           



                          ------+| ?30-59 ? ? ?                           



                          ? ? ? ? ?| ?Stage                           



                          three ? ? ? ?| ? Stage                           



                          three ? ? ? ? ?                           



                          +---------------------                           



                          --+-------------------                           



                          --+-------------------                           



                          ------+| ?15-29 ? ? ?                           



                          ? ? ? ? ?| ?Stage four                           



                          ? ? ? ? | ? Stage four                           



                          ? ? ? ? ?                              



                          ?+--------------------                           



                          ---+------------------                           



                          ---+------------------                           



                          -------+| ?<15 (or                           



                          dialysis) ? ?| ?Stage                           



                          five ? ? ? ? | ? Stage                           



                          five ? ? ? ? ?                           



                          ?+--------------------                           



                          ---+------------------                           



                          ---+------------------                           



                          -------+ *Each stage                           



                          assumes the associated                           



                          GFR level has been in                           



                          effect for at least                           



                          three months. ?Stages                           



                          1 to 5, with or                           



                          without kidney                           



                          disease, indicate                           



                          chronic kidney                           



                          disease. Notes:                           



                          Determination of                           



                          stages one and two                           



                          (with eGFR                             



                          >59mL/min/1.73 m2)                           



                          requires estimation of                           



                          kidney damage for at                           



                          least three months as                           



                          defined by structural                           



                          or functional                           



                          abnormalities of the                           



                          kidney, manifested by                           



                          either:Pathological                           



                          abnormalities or                           



                          Markers of kidney                           



                          damage (including                           



                          abnormalities in the                           



                          composition of the                           



                          blood or urine or                           



                          abnormalities in                           



                          imaging tests).                           

 

             Lab Interpretation Abnormal                               



             (test code = 76821-1)                                        



[facility] WITH [id number] 12:21:36





             Test Item    Value        Reference Range Interpretation Comments

 

             WBC (test code =              See_Comment                [Automated



             1427-2)                                             message] The sy

stem



                                                                 which generated



                                                                 this result



                                                                 transmitted



                                                                 reference range

:



                                                                 4.20 - 10.70



                                                                 10*3/?L. The



                                                                 reference range

 was



                                                                 not used to



                                                                 interpret this



                                                                 result as



                                                                 normal/abnormal

.

 

             RBC (test code =              See_Comment                [Automated



             789-8)                                              message] The sy

stem



                                                                 which generated



                                                                 this result



                                                                 transmitted



                                                                 reference range

:



                                                                 4.26 - 5.52



                                                                 10*6/?L. The



                                                                 reference range

 was



                                                                 not used to



                                                                 interpret this



                                                                 result as



                                                                 normal/abnormal

.

 

             HGB (test code = 15.7 g/dL    12.2-16.4                 



             718-7)                                              

 

             HCT (test code = 48.0 %       38.4-49.3                 



             4544-3)                                             

 

             MCV (test code = 90.1 fL      81.7-95.6                 



             787-2)                                              

 

             MCH (test code = 29.5 pg      26.1-32.7                 



             785-6)                                              

 

             MCHC (test code = 32.7 g/dL    31.2-35.0                 



             786-4)                                              

 

             RDW-SD (test code = 50.6 fL      38.5-51.6                 



             25111-9)                                            

 

             RDW-CV (test code = 15.3 %       12.1-15.4                 



             788-0)                                              

 

             PLT (test code =              See_Comment  H             [Automated



             777-3)                                              message] The sy

stem



                                                                 which generated



                                                                 this result



                                                                 transmitted



                                                                 reference range

:



                                                                 150 - 328 10*3/

?L.



                                                                 The reference r

zhen



                                                                 was not used to



                                                                 interpret this



                                                                 result as



                                                                 normal/abnormal

.

 

             MPV (test code = 10.3 fL      9.8-13.0                  



             74190-3)                                            

 

             NRBC/100 WBC (test              See_Comment                [Automat

ed



             code = 6805821694)                                        message] 

The system



                                                                 which generated



                                                                 this result



                                                                 transmitted



                                                                 reference range

:



                                                                 0.0 - 10.0 /100



                                                                 WBCs. The refer

ence



                                                                 range was not u

sed



                                                                 to interpret th

is



                                                                 result as



                                                                 normal/abnormal

.

 

             NRBC x10^3 (test code <0.01        See_Comment                [Auto

mated



             = [id number])                                        message] The s

ystem



                                                                 which generated



                                                                 this result



                                                                 transmitted



                                                                 reference range

:



                                                                 10*3/?L. The



                                                                 reference range

 was



                                                                 not used to



                                                                 interpret this



                                                                 result as



                                                                 normal/abnormal

.

 

             GRAN MAT (NEUT) % 61.5 %                                 



             (test code = 770-8)                                        

 

             IMM GRAN % (test code 0.80 %                                 



             = [id number])                                        

 

             LYMPH % (test code = 27.8 %                                 



             736-9)                                              

 

             MONO % (test code = 6.9 %                                  



             5905-5)                                             

 

             EOS % (test code = 2.4 %                                  



             713-8)                                              

 

             BASO % (test code = 0.6 %                                  



             706-2)                                              

 

             GRAN MAT x10^3(ANC) 6.07 10*3/uL 1.99-6.95                 



             (test code =                                        



             3245260959)                                         

 

             IMM GRAN x10^3 (test 0.08 10*3/uL 0.00-0.06    H            



             code = 6071975768)                                        

 

             LYMPH x10^3 (test code 2.75 10*3/uL 1.09-3.23                 



             = 731-0)                                            

 

             MONO x10^3 (test code 0.68 10*3/uL 0.36-1.02                 



             = 742-7)                                            

 

             EOS x10^3 (test code = 0.24 10*3/uL 0.06-0.53                 



             711-2)                                              

 

             BASO x10^3 (test code 0.06 10*3/uL 0.01-0.09                 



             = 704-7)                                            

 

             Lab Interpretation Abnormal                               



             (test code = 39213-2)                                        



[facility] Metabolic Panel (NA, K, CL, CO2, 
GLUCOSE, BUN, CREATININE, CA)2022 12:40:30





             Test Item    Value        Reference Range Interpretation Comments

 

             NA (test code = 139 mmol/L   135-145                   



             9068232273)                                         

 

             K (test code = 3.9 mmol/L   3.5-5.0                   



             3994474069)                                         

 

             CL (test code = 108 mmol/L                       



             6034322952)                                         

 

             CO2 TOTAL (test code = 25 mmol/L    23-31                     



             7794179942)                                         

 

             AGAP (test code =              2-16                      



             6146840001)                                         

 

             BUN (test code = 14 mg/dL     7-23                      



             0095827985)                                         

 

             GLUCOSE (test code = 107 mg/dL                        



             2935308991)                                         

 

             CREATININE (test code = 0.61 mg/dL   0.60-1.25                 



             0179058513)                                         

 

             CALCIUM (test code = 8.1 mg/dL    8.6-10.6     L            



             7762170478)                                         

 

             eGFR (test code =              mL/min/1.73m2              



             7289945772)                                         

 

             MAL (test code = MAL) Association of                           



                          Glomerular Filtration                           



                          Rate (GFR) and Staging                           



                          of Kidney Disease*                           



                          +---------------------                           



                          --+-------------------                           



                          --+-------------------                           



                          ------+| GFR                           



                          (mL/min/1.73 m2) ?|                           



                          With Kidney Damage ?|                           



                          ?Without Kidney                           



                          Damage+---------------                           



                          --------+-------------                           



                          --------+-------------                           



                          ------------+| ?>90 ?                           



                          ? ? ? ? ? ? ? ?|                           



                          ?Stage one ? ? ? ? ?|                           



                          ? Normal ? ? ? ? ? ? ?                           



                          ?+--------------------                           



                          ---+------------------                           



                          ---+------------------                           



                          -------+| ?60-89 ? ? ?                           



                          ? ? ? ? ?| ?Stage two                           



                          ? ? ? ? ?| ? Decreased                           



                          GFR ? ? ? ?                            



                          +---------------------                           



                          --+-------------------                           



                          --+-------------------                           



                          ------+| ?30-59 ? ? ?                           



                          ? ? ? ? ?| ?Stage                           



                          three ? ? ? ?| ? Stage                           



                          three ? ? ? ? ?                           



                          +---------------------                           



                          --+-------------------                           



                          --+-------------------                           



                          ------+| ?15-29 ? ? ?                           



                          ? ? ? ? ?| ?Stage four                           



                          ? ? ? ? | ? Stage four                           



                          ? ? ? ? ?                              



                          ?+--------------------                           



                          ---+------------------                           



                          ---+------------------                           



                          -------+| ?<15 (or                           



                          dialysis) ? ?| ?Stage                           



                          five ? ? ? ? | ? Stage                           



                          five ? ? ? ? ?                           



                          ?+--------------------                           



                          ---+------------------                           



                          ---+------------------                           



                          -------+ *Each stage                           



                          assumes the associated                           



                          GFR level has been in                           



                          effect for at least                           



                          three months. ?Stages                           



                          1 to 5, with or                           



                          without kidney                           



                          disease, indicate                           



                          chronic kidney                           



                          disease. Notes:                           



                          Determination of                           



                          stages one and two                           



                          (with eGFR                             



                          >59mL/min/1.73 m2)                           



                          requires estimation of                           



                          kidney damage for at                           



                          least three months as                           



                          defined by structural                           



                          or functional                           



                          abnormalities of the                           



                          kidney, manifested by                           



                          either:Pathological                           



                          abnormalities or                           



                          Markers of kidney                           



                          damage (including                           



                          abnormalities in the                           



                          composition of the                           



                          blood or urine or                           



                          abnormalities in                           



                          imaging tests).                           

 

             Lab Interpretation Abnormal                               



             (test code = 55884-4)                                        



[facility] with [id number] 12:23:51





             Test Item    Value        Reference Range Interpretation Comments

 

             WBC (test code =              See_Comment                [Automated



             3779-2)                                             message] The sy

stem



                                                                 which generated



                                                                 this result



                                                                 transmitted



                                                                 reference range

:



                                                                 4.20 - 10.70



                                                                 10*3/?L. The



                                                                 reference range

 was



                                                                 not used to



                                                                 interpret this



                                                                 result as



                                                                 normal/abnormal

.

 

             RBC (test code =              See_Comment                [Automated



             104-8)                                              message] The sy

stem



                                                                 which generated



                                                                 this result



                                                                 transmitted



                                                                 reference range

:



                                                                 4.26 - 5.52



                                                                 10*6/?L. The



                                                                 reference range

 was



                                                                 not used to



                                                                 interpret this



                                                                 result as



                                                                 normal/abnormal

.

 

             HGB (test code = 14.9 g/dL    12.2-16.4                 



             718-7)                                              

 

             HCT (test code = 44.2 %       38.4-49.3                 



             4544-3)                                             

 

             MCV (test code = 88.4 fL      81.7-95.6                 



             787-2)                                              

 

             MCH (test code = 29.8 pg      26.1-32.7                 



             785-6)                                              

 

             MCHC (test code = 33.7 g/dL    31.2-35.0                 



             786-4)                                              

 

             RDW-SD (test code = 49.3 fL      38.5-51.6                 



             16015-5)                                            

 

             RDW-CV (test code = 15.3 %       12.1-15.4                 



             788-0)                                              

 

             PLT (test code =              See_Comment  H             [Automated



             777-3)                                              message] The sy

stem



                                                                 which generated



                                                                 this result



                                                                 transmitted



                                                                 reference range

:



                                                                 150 - 328 10*3/

?L.



                                                                 The reference r

zhen



                                                                 was not used to



                                                                 interpret this



                                                                 result as



                                                                 normal/abnormal

.

 

             MPV (test code = 10.2 fL      9.8-13.0                  



             27664-6)                                            

 

             NRBC/100 WBC (test              See_Comment                [Automat

ed



             code = 9612365369)                                        message] 

The system



                                                                 which generated



                                                                 this result



                                                                 transmitted



                                                                 reference range

:



                                                                 0.0 - 10.0 /100



                                                                 WBCs. The refer

ence



                                                                 range was not u

sed



                                                                 to interpret th

is



                                                                 result as



                                                                 normal/abnormal

.

 

             NRBC x10^3 (test code <0.01        See_Comment                [Auto

mated



             = 2266637562)                                        message] The s

ystem



                                                                 which generated



                                                                 this result



                                                                 transmitted



                                                                 reference range

:



                                                                 10*3/?L. The



                                                                 reference range

 was



                                                                 not used to



                                                                 interpret this



                                                                 result as



                                                                 normal/abnormal

.

 

             GRAN MAT (NEUT) % 56.7 %                                 



             (test code = 770-8)                                        

 

             IMM GRAN % (test code 0.60 %                                 



             = 3990366852)                                        

 

             LYMPH % (test code = 31.1 %                                 



             736-9)                                              

 

             MONO % (test code = 8.7 %                                  



             5905-5)                                             

 

             EOS % (test code = 2.4 %                                  



             713-8)                                              

 

             BASO % (test code = 0.5 %                                  



             706-2)                                              

 

             GRAN MAT x10^3(ANC) 4.83 10*3/uL 1.99-6.95                 



             (test code =                                        



             2760454182)                                         

 

             IMM GRAN x10^3 (test 0.05 10*3/uL 0.00-0.06                 



             code = 7575914492)                                        

 

             LYMPH x10^3 (test code 2.64 10*3/uL 1.09-3.23                 



             = 731-0)                                            

 

             MONO x10^3 (test code 0.74 10*3/uL 0.36-1.02                 



             = 742-7)                                            

 

             EOS x10^3 (test code = 0.20 10*3/uL 0.06-0.53                 



             711-2)                                              

 

             BASO x10^3 (test code 0.04 10*3/uL 0.01-0.09                 



             = 704-7)                                            

 

             Lab Interpretation Abnormal                               



             (test code = 08913-7)                                        



[facility]. METABOLIC PANEL (81037)2022 
06:32:14





             Test Item    Value        Reference Range Interpretation Comments

 

             NA (test code = 139 mmol/L   135-145                   



             8998212409)                                         

 

             K (test code = 4.5 mmol/L   3.5-5.0                   



             7837433318)                                         

 

             CL (test code = 102 mmol/L                       



             0557201070)                                         

 

             CO2 TOTAL (test code = 26 mmol/L    23-31                     



             2290739008)                                         

 

             AGAP (test code =              2-16                      



             2006314496)                                         

 

             BUN (test code = 16 mg/dL     7-23                      



             0063207291)                                         

 

             GLUCOSE (test code = 99 mg/dL                         



             3689231615)                                         

 

             CREATININE (test code = 0.83 mg/dL   0.60-1.25                 



             0031507401)                                         

 

             TOTAL BILI (test code = 0.6 mg/dL    0.1-1.1                   



             3878146069)                                         

 

             CALCIUM (test code = 9.5 mg/dL    8.6-10.6                  



             1130443449)                                         

 

             T PROTEIN (test code = 8.6 g/dL     6.3-8.2      H            



             3001521501)                                         

 

             ALBUMIN (test code = 4.6 g/dL     3.5-5.0                   



             3232839797)                                         

 

             ALK PHOS (test code = 121 U/L                          



             4303611702)                                         

 

             ALTv (test code = 58 U/L       5-50         H            



             1742-6)                                             

 

             AST(SGOT) (test code = 32 U/L       13-40                     



             0735757779)                                         

 

             eGFR (test code =              mL/min/1.73m2              



             5733667386)                                         

 

             MAL (test code = MAL) Association of                           



                          Glomerular Filtration                           



                          Rate (GFR) and Staging                           



                          of Kidney Disease*                           



                          +---------------------                           



                          --+-------------------                           



                          --+-------------------                           



                          ------+| GFR                           



                          (mL/min/1.73 m2) ?|                           



                          With Kidney Damage ?|                           



                          ?Without Kidney                           



                          Damage+---------------                           



                          --------+-------------                           



                          --------+-------------                           



                          ------------+| ?>90 ?                           



                          ? ? ? ? ? ? ? ?|                           



                          ?Stage one ? ? ? ? ?|                           



                          ? Normal ? ? ? ? ? ? ?                           



                          ?+--------------------                           



                          ---+------------------                           



                          ---+------------------                           



                          -------+| ?60-89 ? ? ?                           



                          ? ? ? ? ?| ?Stage two                           



                          ? ? ? ? ?| ? Decreased                           



                          GFR ? ? ? ?                            



                          +---------------------                           



                          --+-------------------                           



                          --+-------------------                           



                          ------+| ?30-59 ? ? ?                           



                          ? ? ? ? ?| ?Stage                           



                          three ? ? ? ?| ? Stage                           



                          three ? ? ? ? ?                           



                          +---------------------                           



                          --+-------------------                           



                          --+-------------------                           



                          ------+| ?15-29 ? ? ?                           



                          ? ? ? ? ?| ?Stage four                           



                          ? ? ? ? | ? Stage four                           



                          ? ? ? ? ?                              



                          ?+--------------------                           



                          ---+------------------                           



                          ---+------------------                           



                          -------+| ?<15 (or                           



                          dialysis) ? ?| ?Stage                           



                          five ? ? ? ? | ? Stage                           



                          five ? ? ? ? ?                           



                          ?+--------------------                           



                          ---+------------------                           



                          ---+------------------                           



                          -------+ *Each stage                           



                          assumes the associated                           



                          GFR level has been in                           



                          effect for at least                           



                          three months. ?Stages                           



                          1 to 5, with or                           



                          without kidney                           



                          disease, indicate                           



                          chronic kidney                           



                          disease. Notes:                           



                          Determination of                           



                          stages one and two                           



                          (with eGFR                             



                          >59mL/min/1.73 m2)                           



                          requires estimation of                           



                          kidney damage for at                           



                          least three months as                           



                          defined by structural                           



                          or functional                           



                          abnormalities of the                           



                          kidney, manifested by                           



                          either:Pathological                           



                          abnormalities or                           



                          Markers of kidney                           



                          damage (including                           



                          abnormalities in the                           



                          composition of the                           



                          blood or urine or                           



                          abnormalities in                           



                          imaging tests).                           

 

             Lab Interpretation Abnormal                               



             (test code = 12898-2)                                        



[facility] WITH [id number] 05:48:31





             Test Item    Value        Reference Range Interpretation Comments

 

             WBC (test code =              See_Comment  H             [Automated



             3790-2)                                             message] The sy

stem



                                                                 which generated



                                                                 this result



                                                                 transmitted



                                                                 reference range

:



                                                                 4.20 - 10.70



                                                                 10*3/?L. The



                                                                 reference range

 was



                                                                 not used to



                                                                 interpret this



                                                                 result as



                                                                 normal/abnormal

.

 

             RBC (test code =              See_Comment  H             [Automated



             549-8)                                              message] The sy

stem



                                                                 which generated



                                                                 this result



                                                                 transmitted



                                                                 reference range

:



                                                                 4.26 - 5.52



                                                                 10*6/?L. The



                                                                 reference range

 was



                                                                 not used to



                                                                 interpret this



                                                                 result as



                                                                 normal/abnormal

.

 

             HGB (test code = 17.3 g/dL    12.2-16.4    H            



             718-7)                                              

 

             HCT (test code = 51.4 %       38.4-49.3    H            



             4544-3)                                             

 

             MCV (test code = 87.7 fL      81.7-95.6                 



             787-2)                                              

 

             MCH (test code = 29.5 pg      26.1-32.7                 



             785-6)                                              

 

             MCHC (test code = 33.7 g/dL    31.2-35.0                 



             786-4)                                              

 

             RDW-SD (test code = 49.1 fL      38.5-51.6                 



             16244-8)                                            

 

             RDW-CV (test code = 15.5 %       12.1-15.4    H            



             788-0)                                              

 

             PLT (test code =              See_Comment  H             [Automated



             777-3)                                              message] The sy

stem



                                                                 which generated



                                                                 this result



                                                                 transmitted



                                                                 reference range

:



                                                                 150 - 328 10*3/

?L.



                                                                 The reference r

zhen



                                                                 was not used to



                                                                 interpret this



                                                                 result as



                                                                 normal/abnormal

.

 

             MPV (test code = 10.4 fL      9.8-13.0                  



             53031-3)                                            

 

             NRBC/100 WBC (test              See_Comment                [Automat

ed



             code = 6332183744)                                        message] 

The system



                                                                 which generated



                                                                 this result



                                                                 transmitted



                                                                 reference range

:



                                                                 0.0 - 10.0 /100



                                                                 WBCs. The refer

ence



                                                                 range was not u

sed



                                                                 to interpret th

is



                                                                 result as



                                                                 normal/abnormal

.

 

             NRBC x10^3 (test code <0.01        See_Comment                [Auto

mated



             = [id number])                                        message] The s

ystem



                                                                 which generated



                                                                 this result



                                                                 transmitted



                                                                 reference range

:



                                                                 10*3/?L. The



                                                                 reference range

 was



                                                                 not used to



                                                                 interpret this



                                                                 result as



                                                                 normal/abnormal

.

 

             GRAN MAT (NEUT) % 64.8 %                                 



             (test code = 770-8)                                        

 

             IMM GRAN % (test code 0.70 %                                 



             = 0668284459)                                        

 

             LYMPH % (test code = 25.2 %                                 



             736-9)                                              

 

             MONO % (test code = 6.9 %                                  



             5905-5)                                             

 

             EOS % (test code = 1.9 %                                  



             713-8)                                              

 

             BASO % (test code = 0.5 %                                  



             706-2)                                              

 

             GRAN MAT x10^3(ANC) 7.80 10*3/uL 1.99-6.95    H            



             (test code =                                        



             9492619178)                                         

 

             IMM GRAN x10^3 (test 0.08 10*3/uL 0.00-0.06    H            



             code = 9651722209)                                        

 

             LYMPH x10^3 (test code 3.04 10*3/uL 1.09-3.23                 



             = 731-0)                                            

 

             MONO x10^3 (test code 0.83 10*3/uL 0.36-1.02                 



             = 742-7)                                            

 

             EOS x10^3 (test code = 0.23 10*3/uL 0.06-0.53                 



             711-2)                                              

 

             BASO x10^3 (test code 0.06 10*3/uL 0.01-0.09                 



             = 704-7)                                            

 

             Lab Interpretation Abnormal                               



             (test code = 92566-0)                                        



[facility]. METABOLIC PANEL ([id number] 
02:19:08





             Test Item    Value        Reference Range Interpretation Comments

 

             NA (test code = 136 mmol/L   135-145                   



             9048421925)                                         

 

             K (test code = 4.4 mmol/L   3.5-5.0                   



             1010805778)                                         

 

             CL (test code = 99 mmol/L                        



             1613326401)                                         

 

             CO2 TOTAL (test code = 25 mmol/L    23-31                     



             1537211810)                                         

 

             AGAP (test code =              2-16                      



             0298978453)                                         

 

             BUN (test code = 19 mg/dL     7-23                      



             2996439261)                                         

 

             GLUCOSE (test code = 103 mg/dL                        



             8174532255)                                         

 

             CREATININE (test code = 0.70 mg/dL   0.60-1.25                 



             3818140584)                                         

 

             TOTAL BILI (test code = 0.7 mg/dL    0.1-1.1                   



             6045134083)                                         

 

             CALCIUM (test code = 9.2 mg/dL    8.6-10.6                  



             5308040092)                                         

 

             T PROTEIN (test code = 9.4 g/dL     6.3-8.2      H            



             5038730896)                                         

 

             ALBUMIN (test code = 4.8 g/dL     3.5-5.0                   



             8698478112)                                         

 

             ALK PHOS (test code = 146 U/L             H            



             1741652029)                                         

 

             ALTv (test code = 75 U/L       5-50         H            



             1742-6)                                             

 

             AST(SGOT) (test code = 37 U/L       13-40                     



             1474339113)                                         

 

             eGFR (test code =              mL/min/1.73m2              



             9685708115)                                         

 

             MAL (test code = MAL) Association of                           



                          Glomerular Filtration                           



                          Rate (GFR) and Staging                           



                          of Kidney Disease*                           



                          +---------------------                           



                          --+-------------------                           



                          --+-------------------                           



                          ------+| GFR                           



                          (mL/min/1.73 m2) ?|                           



                          With Kidney Damage ?|                           



                          ?Without Kidney                           



                          Damage+---------------                           



                          --------+-------------                           



                          --------+-------------                           



                          ------------+| ?>90 ?                           



                          ? ? ? ? ? ? ? ?|                           



                          ?Stage one ? ? ? ? ?|                           



                          ? Normal ? ? ? ? ? ? ?                           



                          ?+--------------------                           



                          ---+------------------                           



                          ---+------------------                           



                          -------+| ?60-89 ? ? ?                           



                          ? ? ? ? ?| ?Stage two                           



                          ? ? ? ? ?| ? Decreased                           



                          GFR ? ? ? ?                            



                          +---------------------                           



                          --+-------------------                           



                          --+-------------------                           



                          ------+| ?30-59 ? ? ?                           



                          ? ? ? ? ?| ?Stage                           



                          three ? ? ? ?| ? Stage                           



                          three ? ? ? ? ?                           



                          +---------------------                           



                          --+-------------------                           



                          --+-------------------                           



                          ------+| ?15-29 ? ? ?                           



                          ? ? ? ? ?| ?Stage four                           



                          ? ? ? ? | ? Stage four                           



                          ? ? ? ? ?                              



                          ?+--------------------                           



                          ---+------------------                           



                          ---+------------------                           



                          -------+| ?<15 (or                           



                          dialysis) ? ?| ?Stage                           



                          five ? ? ? ? | ? Stage                           



                          five ? ? ? ? ?                           



                          ?+--------------------                           



                          ---+------------------                           



                          ---+------------------                           



                          -------+ *Each stage                           



                          assumes the associated                           



                          GFR level has been in                           



                          effect for at least                           



                          three months. ?Stages                           



                          1 to 5, with or                           



                          without kidney                           



                          disease, indicate                           



                          chronic kidney                           



                          disease. Notes:                           



                          Determination of                           



                          stages one and two                           



                          (with eGFR                             



                          >59mL/min/1.73 m2)                           



                          requires estimation of                           



                          kidney damage for at                           



                          least three months as                           



                          defined by structural                           



                          or functional                           



                          abnormalities of the                           



                          kidney, manifested by                           



                          either:Pathological                           



                          abnormalities or                           



                          Markers of kidney                           



                          damage (including                           



                          abnormalities in the                           



                          composition of the                           



                          blood or urine or                           



                          abnormalities in                           



                          imaging tests).                           

 

             Lab Interpretation Abnormal                               



             (test code = 86756-5)                                        



[facility]





             Test Item    Value        Reference Range Interpretation Comments

 

             LIPASE (test code = 3708977470) 86 U/L       0-220                 

    

 

             Lab Interpretation (test code = Normal                             

    



             29499-2)                                            



[facility] WITH [id number] 01:55:49





             Test Item    Value        Reference Range Interpretation Comments

 

             WBC (test code =              See_Comment  H             [Automated



             6690-2)                                             message] The sy

stem



                                                                 which generated



                                                                 this result



                                                                 transmitted



                                                                 reference range

:



                                                                 4.20 - 10.70



                                                                 10*3/?L. The



                                                                 reference range

 was



                                                                 not used to



                                                                 interpret this



                                                                 result as



                                                                 normal/abnormal

.

 

             RBC (test code =              See_Comment  H             [Automated



             789-8)                                              message] The sy

stem



                                                                 which generated



                                                                 this result



                                                                 transmitted



                                                                 reference range

:



                                                                 4.26 - 5.52



                                                                 10*6/?L. The



                                                                 reference range

 was



                                                                 not used to



                                                                 interpret this



                                                                 result as



                                                                 normal/abnormal

.

 

             HGB (test code = 18.0 g/dL    12.2-16.4    H            



             718-7)                                              

 

             HCT (test code = 53.9 %       38.4-49.3    H            



             4544-3)                                             

 

             MCV (test code = 87.2 fL      81.7-95.6                 



             787-2)                                              

 

             MCH (test code = 29.1 pg      26.1-32.7                 



             785-6)                                              

 

             MCHC (test code = 33.4 g/dL    31.2-35.0                 



             786-4)                                              

 

             RDW-SD (test code = 48.7 fL      38.5-51.6                 



             08160-1)                                            

 

             RDW-CV (test code = 15.4 %       12.1-15.4                 



             788-0)                                              

 

             PLT (test code =              See_Comment  H             [Automated



             777-3)                                              message] The sy

stem



                                                                 which generated



                                                                 this result



                                                                 transmitted



                                                                 reference range

:



                                                                 150 - 328 10*3/

?L.



                                                                 The reference r

zhen



                                                                 was not used to



                                                                 interpret this



                                                                 result as



                                                                 normal/abnormal

.

 

             MPV (test code = 9.9 fL       9.8-13.0                  



             78290-6)                                            

 

             NRBC/100 WBC (test              See_Comment                [Automat

ed



             code = 2496612866)                                        message] 

The system



                                                                 which generated



                                                                 this result



                                                                 transmitted



                                                                 reference range

:



                                                                 0.0 - 10.0 /100



                                                                 WBCs. The refer

ence



                                                                 range was not u

sed



                                                                 to interpret th

is



                                                                 result as



                                                                 normal/abnormal

.

 

             NRBC x10^3 (test code <0.01        See_Comment                [Auto

mated



             = 4038239838)                                        message] The s

ystem



                                                                 which generated



                                                                 this result



                                                                 transmitted



                                                                 reference range

:



                                                                 10*3/?L. The



                                                                 reference range

 was



                                                                 not used to



                                                                 interpret this



                                                                 result as



                                                                 normal/abnormal

.

 

             GRAN MAT (NEUT) % 69.8 %                                 



             (test code = 770-8)                                        

 

             IMM GRAN % (test code 0.50 %                                 



             = 3709951042)                                        

 

             LYMPH % (test code = 20.5 %                                 



             736-9)                                              

 

             MONO % (test code = 6.8 %                                  



             5905-5)                                             

 

             EOS % (test code = 1.9 %                                  



             713-8)                                              

 

             BASO % (test code = 0.5 %                                  



             706-2)                                              

 

             GRAN MAT x10^3(ANC) 7.72 10*3/uL 1.99-6.95    H            



             (test code =                                        



             4019729745)                                         

 

             IMM GRAN x10^3 (test 0.05 10*3/uL 0.00-0.06                 



             code = 8826057121)                                        

 

             LYMPH x10^3 (test code 2.26 10*3/uL 1.09-3.23                 



             = 731-0)                                            

 

             MONO x10^3 (test code 0.75 10*3/uL 0.36-1.02                 



             = 742-7)                                            

 

             EOS x10^3 (test code = 0.21 10*3/uL 0.06-0.53                 



             711-2)                                              

 

             BASO x10^3 (test code 0.06 10*3/uL 0.01-0.09                 



             = 704-7)                                            

 

             Lab Interpretation Abnormal                               



             (test code = 56459-1)                                        



[id number]





             Test Item    Value        Reference    Interpretation Comments



                                       Range                     

 

             D-DIMER (test code =              See_Comment                [Autom

ated



             [id number])                                         message] The



                                                                 system which



                                                                 generated this



                                                                 result



                                                                 transmitted



                                                                 reference range

:



                                                                 <0.41 ?g/mL



                                                                 (FEU). The



                                                                 reference range



                                                                 was not used to



                                                                 interpret this



                                                                 result as



                                                                 normal/abnormal

.

 

             AML (test code = This test may be                           



             MAL)         used in conjunction                           



                          with a clinical                           



                          pretest probability                           



                          (PTP) assessment                           



                          model to exclude                           



                          venous                                 



                          thromboembolism                           



                          (VTE) in patients                           



                          suspected of deep                           



                          venous thrombosis                           



                          (DVT) and pulmonary                           



                          embolism (PE) A                           



                          D-Dimer value less                           



                          than 0.50 ?g/ml                           



                          (FEU) has a negative                           



                          predicative value of                           



                          96 to 100% (95%                           



                          CI)and 97 to 100%                           



                          (95% CI) as an aid                           



                          in the diagnosis of                           



                          deep vein thrombosis                           



                          (DVT) and pulmonary                           



                          embolism when there                           



                          is low or moderate                           



                          pretest probability                           



                          of PE or DVT.                           



                          D-Dimer values are                           



                          expressed in initial                           



                          fibrinogen                             



                          equivalent units                           



                          (FEU)" The assay                           



                          results should be                           



                          used with other                           



                          information,                           



                          including the                           



                          clinical context, in                           



                          forming a diagnosis.                           

 

             Lab Interpretation Normal                                 



             (test code =                                        



             51308-0)                                            



[id number] 
22:42:48





             Test Item    Value        Reference Range Interpretation Comments

 

             NA (test code = 135 mmol/L   135-145                   



             6535814503)                                         

 

             K (test code = 4.6 mmol/L   3.5-5.0                   



             2828554813)                                         

 

             CL (test code = 102 mmol/L                       



             1020047530)                                         

 

             CO2 TOTAL (test code = 24 mmol/L    23-31                     



             5002885364)                                         

 

             AGAP (test code =              2-16                      



             2040119797)                                         

 

             BUN (test code = 17 mg/dL     7-23                      



             5864459495)                                         

 

             GLUCOSE (test code = 107 mg/dL                        



             2654452009)                                         

 

             CREATININE (test code = 0.60 mg/dL   0.60-1.25                 



             1247819286)                                         

 

             TOTAL BILI (test code = 1.0 mg/dL    0.1-1.1                   



             8763947798)                                         

 

             CALCIUM (test code = 9.4 mg/dL    8.6-10.6                  



             1805205813)                                         

 

             T PROTEIN (test code = 8.6 g/dL     6.3-8.2      H            



             0232444974)                                         

 

             ALBUMIN (test code = 4.6 g/dL     3.5-5.0                   



             6657980355)                                         

 

             ALK PHOS (test code = 159 U/L             H            



             3276986101)                                         

 

             ALTv (test code = 77 U/L       5-50         H            



             2-6)                                             

 

             AST(SGOT) (test code = 38 U/L       13-40                     



             0101148156)                                         

 

             eGFR (test code =              mL/min/1.73m2              



             0217166507)                                         

 

             MAL (test code = MAL) Association of                           



                          Glomerular Filtration                           



                          Rate (GFR) and Staging                           



                          of Kidney Disease*                           



                          +---------------------                           



                          --+-------------------                           



                          --+-------------------                           



                          ------+| GFR                           



                          (mL/min/1.73 m2) ?|                           



                          With Kidney Damage ?|                           



                          ?Without Kidney                           



                          Damage+---------------                           



                          --------+-------------                           



                          --------+-------------                           



                          ------------+| ?>90 ?                           



                          ? ? ? ? ? ? ? ?|                           



                          ?Stage one ? ? ? ? ?|                           



                          ? Normal ? ? ? ? ? ? ?                           



                          ?+--------------------                           



                          ---+------------------                           



                          ---+------------------                           



                          -------+| ?60-89 ? ? ?                           



                          ? ? ? ? ?| ?Stage two                           



                          ? ? ? ? ?| ? Decreased                           



                          GFR ? ? ? ?                            



                          +---------------------                           



                          --+-------------------                           



                          --+-------------------                           



                          ------+| ?30-59 ? ? ?                           



                          ? ? ? ? ?| ?Stage                           



                          three ? ? ? ?| ? Stage                           



                          three ? ? ? ? ?                           



                          +---------------------                           



                          --+-------------------                           



                          --+-------------------                           



                          ------+| ?15-29 ? ? ?                           



                          ? ? ? ? ?| ?Stage four                           



                          ? ? ? ? | ? Stage four                           



                          ? ? ? ? ?                              



                          ?+--------------------                           



                          ---+------------------                           



                          ---+------------------                           



                          -------+| ?<15 (or                           



                          dialysis) ? ?| ?Stage                           



                          five ? ? ? ? | ? Stage                           



                          five ? ? ? ? ?                           



                          ?+--------------------                           



                          ---+------------------                           



                          ---+------------------                           



                          -------+ *Each stage                           



                          assumes the associated                           



                          GFR level has been in                           



                          effect for at least                           



                          three months. ?Stages                           



                          1 to 5, with or                           



                          without kidney                           



                          disease, indicate                           



                          chronic kidney                           



                          disease. Notes:                           



                          Determination of                           



                          stages one and two                           



                          (with eGFR                             



                          >59mL/min/1.73 m2)                           



                          requires estimation of                           



                          kidney damage for at                           



                          least three months as                           



                          defined by structural                           



                          or functional                           



                          abnormalities of the                           



                          kidney, manifested by                           



                          either:Pathological                           



                          abnormalities or                           



                          Markers of kidney                           



                          damage (including                           



                          abnormalities in the                           



                          composition of the                           



                          blood or urine or                           



                          abnormalities in                           



                          imaging tests).                           

 

             Lab Interpretation Abnormal                               



             (test code = 68102-1)                                        



[facility] WITH DIFF2022-01-15 22:30:27





             Test Item    Value        Reference Range Interpretation Comments

 

             WBC (test code =              See_Comment  H             [Automated



             6690-2)                                             message] The



                                                                 system which



                                                                 generated this



                                                                 result transmit

huma



                                                                 reference range

:



                                                                 4.20 - 10.70



                                                                 10*3/?L. The



                                                                 reference range



                                                                 was not used to



                                                                 interpret this



                                                                 result as



                                                                 normal/abnormal

.

 

             RBC (test code =              See_Comment  H             [Automated



             789-8)                                              message] The



                                                                 system which



                                                                 generated this



                                                                 result transmit

huma



                                                                 reference range

:



                                                                 4.26 - 5.52



                                                                 10*6/?L. The



                                                                 reference range



                                                                 was not used to



                                                                 interpret this



                                                                 result as



                                                                 normal/abnormal

.

 

             HGB (test code = 17.5 g/dL    12.2-16.4    H            



             718-7)                                              

 

             HCT (test code = 51.0 %       38.4-49.3    H            



             4544-3)                                             

 

             MCV (test code = 86.7 fL      81.7-95.6                 



             787-2)                                              

 

             MCH (test code = 29.8 pg      26.1-32.7                 



             785-6)                                              

 

             MCHC (test code = 34.3 g/dL    31.2-35.0                 



             786-4)                                              

 

             RDW-SD (test code = 48.0 fL      38.5-51.6                 



             36502-0)                                            

 

             RDW-CV (test code = 15.1 %       12.1-15.4                 



             788-0)                                              

 

             PLT (test code =              See_Comment  H             [Automated



             777-3)                                              message] The



                                                                 system which



                                                                 generated this



                                                                 result transmit

huma



                                                                 reference range

:



                                                                 150 - 328 10*3/

?L.



                                                                 The reference



                                                                 range was not u

sed



                                                                 to interpret th

is



                                                                 result as



                                                                 normal/abnormal

.

 

             MPV (test code = 10.3 fL      9.8-13.0                  



             63824-3)                                            

 

             NRBC/100 WBC (test              See_Comment                [Automat

ed



             code = 9866667675)                                        message] 

The



                                                                 system which



                                                                 generated this



                                                                 result transmit

huma



                                                                 reference range

:



                                                                 0.0 - 10.0 /100



                                                                 WBCs. The



                                                                 reference range



                                                                 was not used to



                                                                 interpret this



                                                                 result as



                                                                 normal/abnormal

.

 

             NRBC x10^3 (test code <0.01        See_Comment                [Auto

mated



             = 2412988273)                                        message] The



                                                                 system which



                                                                 generated this



                                                                 result transmit

huma



                                                                 reference range

:



                                                                 10*3/?L. The



                                                                 reference range



                                                                 was not used to



                                                                 interpret this



                                                                 result as



                                                                 normal/abnormal

.

 

             GRAN MAT (NEUT) % 79.9 %                                 



             (test code = 770-8)                                        

 

             IMM GRAN % (test code 0.50 %                                 



             = 2147213489)                                        

 

             LYMPH % (test code = 11.8 %                                 



             736-9)                                              

 

             MONO % (test code = 6.6 %                                  



             5905-5)                                             

 

             EOS % (test code = 0.9 %                                  



             713-8)                                              

 

             BASO % (test code = 0.3 %                                  



             706-2)                                              

 

             GRAN MAT x10^3(ANC) 10.70 10*3/uL 1.99-6.95    H            



             (test code =                                        



             8656150100)                                         

 

             IMM GRAN x10^3 (test 0.07 10*3/uL 0.00-0.06    H            



             code = 1110884688)                                        

 

             LYMPH x10^3 (test code 1.58 10*3/uL 1.09-3.23                 



             = 731-0)                                            

 

             MONO x10^3 (test code 0.89 10*3/uL 0.36-1.02                 



             = 742-7)                                            

 

             EOS x10^3 (test code = 0.12 10*3/uL 0.06-0.53                 



             711-2)                                              

 

             BASO x10^3 (test code 0.04 10*3/uL 0.01-0.09                 



             = 704-7)                                            

 

             Lab Interpretation Abnormal                               



             (test code = 18074-1)                                        



[facility]. METABOLIC PANEL ([id number])2021 
23:02:15





             Test Item    Value        Reference Range Interpretation Comments

 

             NA (test code = 137 mmol/L   135-145                   



             3530126282)                                         

 

             K (test code = 4.5 mmol/L   3.5-5.0                   



             9846456457)                                         

 

             CL (test code = 109 mmol/L          H            



             8616403340)                                         

 

             CO2 TOTAL (test code = 22 mmol/L    23-31        L            



             5843809198)                                         

 

             AGAP (test code =              2-16                      



             2683196105)                                         

 

             BUN (test code = 7 mg/dL      7-23                      



             4748000821)                                         

 

             GLUCOSE (test code = 87 mg/dL                         



             5205488987)                                         

 

             CREATININE (test code = 0.61 mg/dL   0.60-1.25                 



             3108483465)                                         

 

             TOTAL BILI (test code = 0.5 mg/dL    0.1-1.1                   



             3454077583)                                         

 

             CALCIUM (test code = 9.0 mg/dL    8.6-10.6                  



             6393745489)                                         

 

             T PROTEIN (test code = 6.9 g/dL     6.3-8.2                   



             8521180455)                                         

 

             ALBUMIN (test code = 3.5 g/dL     3.5-5.0                   



             9361121757)                                         

 

             ALK PHOS (test code = 112 U/L                          



             4165650568)                                         

 

             ALTv (test code = 35 U/L       5-50                      



             2-6)                                             

 

             AST(SGOT) (test code = 21 U/L       13-40                     



             3943086194)                                         

 

             eGFR (test code =              mL/min/1.73m2              



             9458344008)                                         

 

             MAL (test code = MAL) Association of                           



                          Glomerular Filtration                           



                          Rate (GFR) and Staging                           



                          of Kidney Disease*                           



                          +---------------------                           



                          --+-------------------                           



                          --+-------------------                           



                          ------+| GFR                           



                          (mL/min/1.73 m2) ?|                           



                          With Kidney Damage ?|                           



                          ?Without Kidney                           



                          Damage+---------------                           



                          --------+-------------                           



                          --------+-------------                           



                          ------------+| ?>90 ?                           



                          ? ? ? ? ? ? ? ?|                           



                          ?Stage one ? ? ? ? ?|                           



                          ? Normal ? ? ? ? ? ? ?                           



                          ?+--------------------                           



                          ---+------------------                           



                          ---+------------------                           



                          -------+| ?60-89 ? ? ?                           



                          ? ? ? ? ?| ?Stage two                           



                          ? ? ? ? ?| ? Decreased                           



                          GFR ? ? ? ?                            



                          +---------------------                           



                          --+-------------------                           



                          --+-------------------                           



                          ------+| ?30-59 ? ? ?                           



                          ? ? ? ? ?| ?Stage                           



                          three ? ? ? ?| ? Stage                           



                          three ? ? ? ? ?                           



                          +---------------------                           



                          --+-------------------                           



                          --+-------------------                           



                          ------+| ?15-29 ? ? ?                           



                          ? ? ? ? ?| ?Stage four                           



                          ? ? ? ? | ? Stage four                           



                          ? ? ? ? ?                              



                          ?+--------------------                           



                          ---+------------------                           



                          ---+------------------                           



                          -------+| ?<15 (or                           



                          dialysis) ? ?| ?Stage                           



                          five ? ? ? ? | ? Stage                           



                          five ? ? ? ? ?                           



                          ?+--------------------                           



                          ---+------------------                           



                          ---+------------------                           



                          -------+ *Each stage                           



                          assumes the associated                           



                          GFR level has been in                           



                          effect for at least                           



                          three months. ?Stages                           



                          1 to 5, with or                           



                          without kidney                           



                          disease, indicate                           



                          chronic kidney                           



                          disease. Notes:                           



                          Determination of                           



                          stages one and two                           



                          (with eGFR                             



                          >59mL/min/1.73 m2)                           



                          requires estimation of                           



                          kidney damage for at                           



                          least three months as                           



                          defined by structural                           



                          or functional                           



                          abnormalities of the                           



                          kidney, manifested by                           



                          either:Pathological                           



                          abnormalities or                           



                          Markers of kidney                           



                          damage (including                           



                          abnormalities in the                           



                          composition of the                           



                          blood or urine or                           



                          abnormalities in                           



                          imaging tests).                           

 

             Lab Interpretation Abnormal                               



             (test code = 98575-6)                                        



[facility] WITH [id number] 22:51:57





             Test Item    Value        Reference Range Interpretation Comments

 

             WBC (test code =              See_Comment  H             [Automated



             6690-2)                                             message] The sy

stem



                                                                 which generated



                                                                 this result



                                                                 transmitted



                                                                 reference range

:



                                                                 4.20 - 10.70



                                                                 10*3/?L. The



                                                                 reference range

 was



                                                                 not used to



                                                                 interpret this



                                                                 result as



                                                                 normal/abnormal

.

 

             RBC (test code =              See_Comment                [Automated



             789-8)                                              message] The sy

stem



                                                                 which generated



                                                                 this result



                                                                 transmitted



                                                                 reference range

:



                                                                 4.26 - 5.52



                                                                 10*6/?L. The



                                                                 reference range

 was



                                                                 not used to



                                                                 interpret this



                                                                 result as



                                                                 normal/abnormal

.

 

             HGB (test code = 14.7 g/dL    12.2-16.4                 



             718-7)                                              

 

             HCT (test code = 43.7 %       38.4-49.3                 



             4544-3)                                             

 

             MCV (test code = 85.9 fL      81.7-95.6                 



             787-2)                                              

 

             MCH (test code = 28.9 pg      26.1-32.7                 



             785-6)                                              

 

             MCHC (test code = 33.6 g/dL    31.2-35.0                 



             786-4)                                              

 

             RDW-SD (test code = 42.4 fL      38.5-51.6                 



             77055-6)                                            

 

             RDW-CV (test code = 13.6 %       12.1-15.4                 



             788-0)                                              

 

             PLT (test code =              See_Comment  H             [Automated



             777-3)                                              message] The sy

stem



                                                                 which generated



                                                                 this result



                                                                 transmitted



                                                                 reference range

:



                                                                 150 - 328 10*3/

?L.



                                                                 The reference r

zhen



                                                                 was not used to



                                                                 interpret this



                                                                 result as



                                                                 normal/abnormal

.

 

             MPV (test code = 9.4 fL       9.8-13.0     L            



             78786-8)                                            

 

             NRBC/100 WBC (test              See_Comment                [Automat

ed



             code = 1108882085)                                        message] 

The system



                                                                 which generated



                                                                 this result



                                                                 transmitted



                                                                 reference range

:



                                                                 0.0 - 10.0 /100



                                                                 WBCs. The refer

ence



                                                                 range was not u

sed



                                                                 to interpret th

is



                                                                 result as



                                                                 normal/abnormal

.

 

             NRBC x10^3 (test code <0.01        See_Comment                [Auto

mated



             = 2530279305)                                        message] The s

ystem



                                                                 which generated



                                                                 this result



                                                                 transmitted



                                                                 reference range

:



                                                                 10*3/?L. The



                                                                 reference range

 was



                                                                 not used to



                                                                 interpret this



                                                                 result as



                                                                 normal/abnormal

.

 

             GRAN MAT (NEUT) % 76.4 %                                 



             (test code = 770-8)                                        

 

             IMM GRAN % (test code 0.40 %                                 



             = 7042712800)                                        

 

             LYMPH % (test code = 16.3 %                                 



             736-9)                                              

 

             MONO % (test code = 5.6 %                                  



             5905-5)                                             

 

             EOS % (test code = 1.0 %                                  



             713-8)                                              

 

             BASO % (test code = 0.3 %                                  



             706-2)                                              

 

             GRAN MAT x10^3(ANC) 8.81 10*3/uL 1.99-6.95    H            



             (test code =                                        



             7955929538)                                         

 

             IMM GRAN x10^3 (test 0.05 10*3/uL 0.00-0.06                 



             code = 4383922298)                                        

 

             LYMPH x10^3 (test code 1.88 10*3/uL 1.09-3.23                 



             = 731-0)                                            

 

             MONO x10^3 (test code 0.64 10*3/uL 0.36-1.02                 



             = 742-7)                                            

 

             EOS x10^3 (test code = 0.12 10*3/uL 0.06-0.53                 



             711-2)                                              

 

             BASO x10^3 (test code 0.03 10*3/uL 0.01-0.09                 



             = 704-7)                                            

 

             Lab Interpretation Abnormal                               



             (test code = 65829-7)                                        



[facility]:01:33





             Test Item    Value        Reference Range Interpretation Comments

 

             SARS-CoV-2 Rapid ID NOW Not Detected Not Detected              



             (test code = 26792-1)                                        

 

             MAL (test code = MAL) ID NOW COVID-19 Assay                        

   



                          is an isothermal                           



                          nucleic acid                           



                          amplification test                           



                          intended for the                           



                          qualitative detection                           



                          of nucleic acid from                           



                          SARS-CoV-2 viral RNA                           



                          in nasopharyngeal (NP)                           



                          specimens. It is used                           



                          under Emergency Use                           



                          Authorization (EUA) by                           



                          FDA. The limit of                           



                          detection (LOD) of the                           



                          assay is 125 Genome                           



                          Equivalents/mL. A                           



                          positive result is                           



                          indicative of the                           



                          presence of SARS-CoV-2                           



                          RNA. ?Clinical                           



                          correlation with                           



                          patient history and                           



                          other diagnostic                           



                          information is                           



                          necessary to determine                           



                          patient infection                           



                          status. A negative                           



                          (Not Detected) result                           



                          does not preclude                           



                          SARS-CoV-2 infection.                           



                          In patients with                           



                          clinical symptoms and                           



                          other tests that are                           



                          consistent with                           



                          SARS-CoV-2 infection,                           



                          negative results                           



                          should be treated as                           



                          presumptive negative                           



                          and a new specimen                           



                          should be tested with                           



                          alternative PCR                           



                          molecular test.                           



                          Invalid: Please                           



                          collect a new specimen                           



                          for repeat patient                           



                          testing if clinically                           



                          indicated.                             

 

             Lab Interpretation Normal                                 



             (test code = 68174-5)                                        



[id number] 
01:00:33





             Test Item    Value        Reference Range Interpretation Comments

 

             NA (test code = 140 mmol/L   135-145                   



             9485115652)                                         

 

             K (test code = 4.4 mmol/L   3.5-5.0                   



             0741671070)                                         

 

             CL (test code = 103 mmol/L                       



             8293641763)                                         

 

             CO2 TOTAL (test code = 28 mmol/L    23-31                     



             8188272206)                                         

 

             AGAP (test code =              2-16                      



             7187440778)                                         

 

             BUN (test code = 15 mg/dL     7-23                      



             7667814913)                                         

 

             GLUCOSE (test code = 85 mg/dL                         



             3613011610)                                         

 

             CREATININE (test code = 0.60 mg/dL   0.60-1.25                 



             6129910075)                                         

 

             TOTAL BILI (test code = 1.1 mg/dL    0.1-1.1                   



             2455416682)                                         

 

             CALCIUM (test code = 9.0 mg/dL    8.6-10.6                  



             2327136548)                                         

 

             T PROTEIN (test code = 7.8 g/dL     6.3-8.2                   



             0414263119)                                         

 

             ALBUMIN (test code = 4.0 g/dL     3.5-5.0                   



             4427906127)                                         

 

             ALK PHOS (test code = 166 U/L             H            



             5920976748)                                         

 

             ALTv (test code = 42 U/L       5-50                      



             1742-6)                                             

 

             AST(SGOT) (test code = 32 U/L       13-40                     



             6554969504)                                         

 

             eGFR (test code =              mL/min/1.73m2              



             9750939530)                                         

 

             MAL (test code = MAL) Association of                           



                          Glomerular Filtration                           



                          Rate (GFR) and Staging                           



                          of Kidney Disease*                           



                          +---------------------                           



                          --+-------------------                           



                          --+-------------------                           



                          ------+| GFR                           



                          (mL/min/1.73 m2) ?|                           



                          With Kidney Damage ?|                           



                          ?Without Kidney                           



                          Damage+---------------                           



                          --------+-------------                           



                          --------+-------------                           



                          ------------+| ?>90 ?                           



                          ? ? ? ? ? ? ? ?|                           



                          ?Stage one ? ? ? ? ?|                           



                          ? Normal ? ? ? ? ? ? ?                           



                          ?+--------------------                           



                          ---+------------------                           



                          ---+------------------                           



                          -------+| ?60-89 ? ? ?                           



                          ? ? ? ? ?| ?Stage two                           



                          ? ? ? ? ?| ? Decreased                           



                          GFR ? ? ? ?                            



                          +---------------------                           



                          --+-------------------                           



                          --+-------------------                           



                          ------+| ?30-59 ? ? ?                           



                          ? ? ? ? ?| ?Stage                           



                          three ? ? ? ?| ? Stage                           



                          three ? ? ? ? ?                           



                          +---------------------                           



                          --+-------------------                           



                          --+-------------------                           



                          ------+| ?15-29 ? ? ?                           



                          ? ? ? ? ?| ?Stage four                           



                          ? ? ? ? | ? Stage four                           



                          ? ? ? ? ?                              



                          ?+--------------------                           



                          ---+------------------                           



                          ---+------------------                           



                          -------+| ?<15 (or                           



                          dialysis) ? ?| ?Stage                           



                          five ? ? ? ? | ? Stage                           



                          five ? ? ? ? ?                           



                          ?+--------------------                           



                          ---+------------------                           



                          ---+------------------                           



                          -------+ *Each stage                           



                          assumes the associated                           



                          GFR level has been in                           



                          effect for at least                           



                          three months. ?Stages                           



                          1 to 5, with or                           



                          without kidney                           



                          disease, indicate                           



                          chronic kidney                           



                          disease. Notes:                           



                          Determination of                           



                          stages one and two                           



                          (with eGFR                             



                          >59mL/min/1.73 m2)                           



                          requires estimation of                           



                          kidney damage for at                           



                          least three months as                           



                          defined by structural                           



                          or functional                           



                          abnormalities of the                           



                          kidney, manifested by                           



                          either:Pathological                           



                          abnormalities or                           



                          Markers of kidney                           



                          damage (including                           



                          abnormalities in the                           



                          composition of the                           



                          blood or urine or                           



                          abnormalities in                           



                          imaging tests).                           

 

             Lab Interpretation Abnormal                               



             (test code = 71210-2)                                        



[facility]





             Test Item    Value        Reference Range Interpretation Comments

 

             LIPASE (test code = 8681549035) 57 U/L       0-220                 

    

 

             Lab Interpretation (test code = Normal                             

    



             12763-2)                                            



[facility]





             Test Item    Value        Reference Range Interpretation Comments

 

             APPEARANCE (test code = Turbid       Clear        A            



             6751155450)                                         

 

             COLOR (test code = Yellow       Yellow                    



             1026394444)                                         

 

             PH (test code =              4.8-8.0                   



             4588130965)                                         

 

             SP GRAVITY (test code =              1.003-1.030               



             5446840992)                                         

 

             GLU U QUAL (test code = Normal       Normal                    



             8101068502)                                         

 

             BLOOD (test code = 1+           Negative     A            



             4382392143)                                         

 

             KETONES (test code = 20 mg/dL     Negative     A            



             8736109300)                                         

 

             PROTEIN (test code = 100 mg/dL    Negative     A            



             2887-8)                                             

 

             UROBILIN (test code = 2.0 mg/dL    Normal       A            



             5393916993)                                         

 

             BILIRUBIN (test code = Negative     Negative                  



             3764490495)                                         

 

             NITRITE (test code = Positive     Negative     A            



             5448942447)                                         

 

             LEUK FRANCE (test code = 250/uL       Negative     A            



             0558320803)                                         

 

             RBC/HPF (test code =              See_Comment  H             [Autom

ated message]



             7915969950)                                         The system [employer]



                                                                 generated this



                                                                 result transmit

huma



                                                                 reference range

: 0 -



                                                                 3 HPF. The refe

rence



                                                                 range was not u

sed



                                                                 to interpret th

is



                                                                 result as



                                                                 normal/abnormal

.

 

             WBC/HPF (test code = >182         See_Comment  H             [Autom

ated message]



             1871811773)                                         The system [employer]



                                                                 generated this



                                                                 result transmit

huma



                                                                 reference range

: 0 -



                                                                 5 HPF. The refe

rence



                                                                 range was not u

sed



                                                                 to interpret th

is



                                                                 result as



                                                                 normal/abnormal

.

 

             BACTERIA (test code = Many         Negative     A            



             1956559844)                                         

 

             MUCOUS (test code = Moderate     Negative LPF A            



             2368807902)                                         

 

             WBC CLUMPS (test code =              See_Comment  H             [Au

tomated message]



             8979232415)                                         The system [employer]



                                                                 generated this



                                                                 result transmit

huma



                                                                 reference range

: <=1



                                                                 HPF. The refere

nce



                                                                 range was not u

sed



                                                                 to interpret th

is



                                                                 result as



                                                                 normal/abnormal

.

 

             Lab Interpretation (test Abnormal                               



             code = 71127-3)                                        



[facility] WITH DIFF2021-05-10 00:46:14





             Test Item    Value        Reference Range Interpretation Comments

 

             WBC (test code =              See_Comment                [Automated



             6690-2)                                             message] The sy

stem



                                                                 which generated



                                                                 this result



                                                                 transmitted



                                                                 reference range

:



                                                                 4.20 - 10.70



                                                                 10*3/?L. The



                                                                 reference range

 was



                                                                 not used to



                                                                 interpret this



                                                                 result as



                                                                 normal/abnormal

.

 

             RBC (test code =              See_Comment                [Automated



             789-8)                                              message] The sy

stem



                                                                 which generated



                                                                 this result



                                                                 transmitted



                                                                 reference range

:



                                                                 4.26 - 5.52



                                                                 10*6/?L. The



                                                                 reference range

 was



                                                                 not used to



                                                                 interpret this



                                                                 result as



                                                                 normal/abnormal

.

 

             HGB (test code = 15.9 g/dL    12.2-16.4                 



             718-7)                                              

 

             HCT (test code = 47.1 %       38.4-49.3                 



             4544-3)                                             

 

             MCV (test code = 86.6 fL      81.7-95.6                 



             787-2)                                              

 

             MCH (test code = 29.2 pg      26.1-32.7                 



             785-6)                                              

 

             MCHC (test code = 33.8 g/dL    31.2-35.0                 



             786-4)                                              

 

             RDW-SD (test code = 47.6 fL      38.5-51.6                 



             27757-5)                                            

 

             RDW-CV (test code = 15.2 %       12.1-15.4                 



             788-0)                                              

 

             PLT (test code =              See_Comment  H             [Automated



             777-3)                                              message] The sy

stem



                                                                 which generated



                                                                 this result



                                                                 transmitted



                                                                 reference range

:



                                                                 150 - 328 10*3/

?L.



                                                                 The reference r

zhen



                                                                 was not used to



                                                                 interpret this



                                                                 result as



                                                                 normal/abnormal

.

 

             MPV (test code = 9.4 fL       9.8-13.0     L            



             13969-5)                                            

 

             NRBC/100 WBC (test              See_Comment                [Automat

ed



             code = 9630836633)                                        message] 

The system



                                                                 which generated



                                                                 this result



                                                                 transmitted



                                                                 reference range

:



                                                                 0.0 - 10.0 /100



                                                                 WBCs. The refer

ence



                                                                 range was not u

sed



                                                                 to interpret th

is



                                                                 result as



                                                                 normal/abnormal

.

 

             NRBC x10^3 (test code <0.01        See_Comment                [Auto

mated



             = 7037950329)                                        message] The s

ystem



                                                                 which generated



                                                                 this result



                                                                 transmitted



                                                                 reference range

:



                                                                 10*3/?L. The



                                                                 reference range

 was



                                                                 not used to



                                                                 interpret this



                                                                 result as



                                                                 normal/abnormal

.

 

             GRAN MAT (NEUT) % 61.3 %                                 



             (test code = 770-8)                                        

 

             IMM GRAN % (test code 0.70 %                                 



             = 2962744231)                                        

 

             LYMPH % (test code = 26.4 %                                 



             736-9)                                              

 

             MONO % (test code = 9.9 %                                  



             5905-5)                                             

 

             EOS % (test code = 1.3 %                                  



             713-8)                                              

 

             BASO % (test code = 0.4 %                                  



             706-2)                                              

 

             GRAN MAT x10^3(ANC) 6.39 10*3/uL 1.99-6.95                 



             (test code =                                        



             3176872134)                                         

 

             IMM GRAN x10^3 (test 0.07 10*3/uL 0.00-0.06    H            



             code = 8408984236)                                        

 

             LYMPH x10^3 (test code 2.75 10*3/uL 1.09-3.23                 



             = 731-0)                                            

 

             MONO x10^3 (test code 1.03 10*3/uL 0.36-1.02    H            



             = 742-7)                                            

 

             EOS x10^3 (test code = 0.14 10*3/uL 0.06-0.53                 



             711-2)                                              

 

             BASO x10^3 (test code 0.04 10*3/uL 0.01-0.09                 



             = 704-7)                                            

 

             Lab Interpretation Abnormal                               



             (test code = 88123-6)                                        



[id number]03:00





             Test Item    Value        Reference Range Interpretation Comments

 

             POC-GLUCOSE METER 140 mg/dL           H            : TESTED A

T [facility] [address]



             (BEAKER) (test code =                                        [place],



             [address])                                               [address]:



                                                                 /Techni

christina ID



                                                                 = [id number] for AMAYA ACOSTA



                                                                 ANANT



POCT-GLUCOSE [id number] 09:15:00





             Test Item    Value        Reference Range Interpretation Comments

 

             POC-GLUCOSE METER 142 mg/dL           H            : TESTED A

T [facility] [address]



             (BEAKER) (test code =                                        [facility],



             [facility])                                               [address]:



                                                                 /Techni

christina ID



                                                                 = [id number] for ANANT CATES



POCT-GLUCOSE METER2020-01-15 20:56:00





             Test Item    Value        Reference Range Interpretation Comments

 

             POC-GLUCOSE METER 134 mg/dL           H            : TESTED A

T [facility] [address]



             (BEAKER) (test code =                                        [facility],



             [facility])                                               [address]:



                                                                 /Techni

christina ID



                                                                 = [id number] for SHERRILL GUARDADO



POCT-GLUCOSE METER2020-01-15 16:46:00





             Test Item    Value        Reference Range Interpretation Comments

 

             POC-GLUCOSE METER 91 mg/dL                         : TESTED A

T [facility] [address]



             (BEAKER) (test code =                                        [facility],



             [facility])                                               [address]:



                                                                 /Techni

christina ID =



                                                                 [id number] for ANANT HAMILTON



POCT-GLUCOSE METER2020-01-15 12:19:00





             Test Item    Value        Reference Range Interpretation Comments

 

             POC-GLUCOSE METER 237 mg/dL           H            : TESTED A

T [facility] [address]



             (BEAKER) (test code =                                        [facility],



             [facility])                                               [address]:



                                                                 /Techni

christina ID



                                                                 = [id number] for ANANT CATES



MR, EXTREMITY, LOWER, WITHOUT CONTRAST, RIGHT2020-01-15 09:12:00FINAL REPORT 
PATIENT ID: [id number] MRI of the right and left hindfoot without intravenous 
contrast History: Osteomyelitis, diabetic foot Comparison: Radiograph dated 
2020 Technique: Multiplanar multisequence MRI of the right and left 
hindfoot was performed without intravenous contrast using the hindfoot 
osteomyelitis protocol Findings: Right foot: Marked T1 and T2 hypointense soft 
tissue thickening is noted at the medial aspect of the right heel, likely to 
reflect scar tissue. There is also mild subcutaneous edema at the lateral aspect
of the mid foot and forefoot, incompletely imaged. Nodiscrete drainable fluid 
collection is identified. There is no marrow signal abnormality to suggest o
steomyelitis. There is a small nonspecific joint effusion at the ankle and 
subtalar joint. Scattereddegenerative changes are noted. There is marked fatty 
atrophy of the distal leg and foot musculature. Severe flexor hallucis longus 
tendinopathy. No tenosynovitis. Left foot: There is a large area of soft tissue 
defect and granulation tissue at the posteromedial aspect of the heel, reaching 
the calcaneus, but there is no drainable fluid collection. Deformity is noted in
the hindfoot, and the forefootappears adducted. No acute fracture. No marrow 
signal abnormality is identified to indicate acute osteomyelitis. There is no 
significant joint effusion. There is severe atrophy of the foot and distal leg 
musculature. No significant tenosynovitis identified. IMPRESSION: 
Granulation/scar tissue at the medial aspect of the heel bilaterally. No MR 
evidence of osteomyelitis. Severe muscle atrophy. Signed:Jorge Cornejoort 
Verified Date/Time: 01/15/2020 09:12:01 Reading Location: [facility] [id number] Ortho 
Consult Reading Room Electronically signed by: JORGE CORNEJO M.D. on 01/15/2020 
09:12 AMMR, EXTREMITY, LOWER, WITHOUT CONTRAST, LEFT2020-01-15 09:12:00FINAL 
REPORT PATIENT ID: [id number] MRI of the right and left hindfoot without 
intravenous contrast History: Osteomyelitis, diabetic foot Comparison: 
Radiograph dated 2020 Technique: Multiplanar multisequence MRI of the
right and left hindfoot was performed without intravenous contrast using the 
hindfoot osteomyelitis protocol Findings: Right foot: Marked T1 and T2 
hypointense soft tissue thickening is noted at the medial aspect of the right 
heel, likely to reflect scar tissue. There is also mild subcutaneous edema at 
the lateral aspect of the mid foot and forefoot, incompletely imaged. Nodiscrete
drainable fluid collection is identified. There is no marrow signal abnormality 
to suggest osteomyelitis. There is a small nonspecific joint effusion at the 
ankle and subtalar joint. Scattereddegenerative changes are noted. There is 
marked fatty atrophy of the distal leg and foot musculature. Severe flexor 
hallucis longus tendinopathy. No tenosynovitis. Left foot: There is a large area
of soft tissue defect and granulation tissue at the posteromedial aspect of the 
heel, reaching the calcaneus, but there is no drainable fluid collection. 
Deformity is noted in the hindfoot, and the forefootappears adducted. No acute 
fracture. No marrow signal abnormality is identified to indicate acute ost
eomyelitis. There is no significant joint effusion. There is severe atrophy of 
the foot and distal leg musculature. No significant tenosynovitis identified. 
IMPRESSION: Granulation/scar tissue at the medial aspect of the heel 
bilaterally. No MR evidence of osteomyelitis. Severe muscle atrophy. Signed:Jorge Cornejo Verified Date/Time: 01/15/2020 09:12:01 Reading Location: [address] Ortho Consult Reading Room Electronically signed by: JORGE CORNEJO M.D. on 
01/15/2020 09:12 AMPOCT-GLUCOSE METER2020-01-15 08:47:00





             Test Item    Value        Reference Range Interpretation Comments

 

             POC-GLUCOSE METER 264 mg/dL           H            : TESTED A

T [facility] [address]



             (BEAKER) (test code =                                        [facility],



             153)                                               [id number]:



                                                                 /Techni

christina ID



                                                                 = [id number] for ANANT CATES



ISLET CELL AB SCR2020-01-15 07:43:00





             Test Item    Value        Reference Range Interpretation Comments

 

             ISLET CELL AB Refer to individual                           



             AUTOVERIFICATION (test Islet Cell Ab                           



             code = 2556) and/or Islet Cell                           



                          Ab Titer results.                           



POCT-GLUCOSE [id number] 21:27:00





             Test Item    Value        Reference Range Interpretation Comments

 

             POC-GLUCOSE METER 130 mg/dL           H            : TESTED A

T [facility] [address]



             (BEAKER) (test code =                                        [facility],



             1538)                                               [address]:



                                                                 /Techni

christina ID



                                                                 = [id number] for JESICA

SHAQKRANTHI



BLOOD [id number] 13:00:00





             Test Item    Value        Reference Range Interpretation Comments

 

             CULTURE (BEAKER) (test No growth in 5 days                         

  



             code = 1095)                                        



BLOOD [id number] 13:00:00





             Test Item    Value        Reference Range Interpretation Comments

 

             CULTURE (BEAKER) (test No growth in 5 days                         

  



             code = 1095)                                        



POCT-GLUCOSE [id number] 12:57:00





             Test Item    Value        Reference Range Interpretation Comments

 

             POC-GLUCOSE METER 138 mg/dL           H            : TESTED A

T [facility] [address]



             (BEAKER) (test code =                                        [facility],



             1538)                                               [address]:



                                                                 /Techni

christina ID



                                                                 = [id number] for BARBARA HARKINS



POCT-GLUCOSE [id number] 08:43:00





             Test Item    Value        Reference Range Interpretation Comments

 

             POC-GLUCOSE METER 226 mg/dL           H            : TESTED A

[facility]



             ([facility]) (test code =                                        [facility],



             153)                                               [address]:



                                                                 /Techni

christina ID



                                                                 = [id number] for [place]

CRESENCIO,



                                                                 BARBARA



POCT-GLUCOSE [id number] 21:11:00





             Test Item    Value        Reference Range Interpretation Comments

 

             POC-GLUCOSE METER 254 mg/dL           H            : Notified

 RN/MD:



             ([facility]) (test code =                                        TESTED

 AT [facility] [address]



             1538)                                               [facility],



                                                                 [address]:



                                                                 /Techni

christina ID



                                                                 = [id number] for KRANTHI PIERRE



POCT-GLUCOSE [id number] 21:02:00





             Test Item    Value        Reference Range Interpretation Comments

 

             POC-GLUCOSE METER 177 mg/dL           H            : TESTED A

[facility]



             ([facility]) (test code =                                        [facility],



             153)                                               [address]:



                                                                 /Techni

christina ID



                                                                 = [id number] for [place]

LLNIURKA,



                                                                 BARBARA



POCT-GLUCOSE [id number] 12:31:00





             Test Item    Value        Reference Range Interpretation Comments

 

             POC-GLUCOSE METER 215 mg/dL           H            : TESTED A

[facility]



             ([facility]) (test code =                                        [facility],



             153)                                               [address]:



                                                                 /Techni

christina ID



                                                                 = [id number] for CAROL YANCEYIS,



                                                                 BARBARA



WOUND CULTURE + GRAM [id number] 10:10:00





             Test Item    Value        Reference    Interpretation Comments



                                       Range                     

 

             CULTURE ([facility]) PROTEUS MIRABILIS              A            1+ Pro

teus



             (test code = 1095)                                        mirabilis

 

             Amikacin (test code =                           S            



             1)                                                  

 

             Ampicillin +                           S            



             Sulbactam (test code                                        



             = 6)                                                

 

             Aztreonam (test code                           S            



             = 32)                                               

 

             Cefepime (test code =                           S            



             51)                                                 

 

             Cefoxitin (test code                           R            



             = 68)                                               

 

             Ceftazidime (test                           S            



             code = 27)                                          

 

             Ceftriaxone (test                           S            



             code = 52)                                          

 

             Ertapenem (test code                           S            



             = 38)                                               

 

             Gentamicin (test code                           S            



             = 18)                                               

 

             Levofloxacin (test                           R            



             code = 22)                                          

 

             Meropenem (test code                           S            



             = 34)                                               

 

             Piperacillin +                           S            



             Tazobactam (test code                                        



             = 29)                                               

 

             Tetracycline (test                           R            



             code = 2)                                           

 

             Tobramycin (test code                           S            



             = 25)                                               

 

             Trimethoprim +                           R            



             Sulfamethoxazole                                        



             (test code = 47)                                        

 

             CULTURE (BEAKER) METHICILLIN               A            1+ Methicil

bryn



             (test code = 1095) RESISTANT                              resistant



                          STAPHYLOCOCCUS                           Staphylococcu

s



                          AUREUS                                 aureus

 

             Clindamycin (test                           R            



             code = 10)                                          

 

             Erythromycin (test                           R            



             code = 4)                                           

 

             Linezolid (test code                           S            



             = 40)                                               

 

             Nitrofurantoin (test                           S            



             code = 23)                                          

 

             Oxacillin (test code                           R            



             = 14)                                               

 

             Rifampin (test code =                           S            



             43)                                                 

 

             Tetracycline (test                           S            



             code = 2)                                           

 

             Trimethoprim +                           S            



             Sulfamethoxazole                                        



             (test code = 47)                                        

 

             Vancomycin (test code                           S            



             = 13)                                               

 

             CULTURE (BEAKER) ESCHERICHIA COLI              A            <1+ Esc

herichia



             (test code = 1095)                                        coliESBL 

Positive

 

             Amikacin (test code =                           S            



             1)                                                  

 

             Ampicillin +                           R            



             Sulbactam (test code                                        



             = 6)                                                

 

             Aztreonam (test code                           R            



             = 32)                                               

 

             Cefepime (test code =                           R            



             51)                                                 

 

             Cefoxitin (test code                           R            



             = 68)                                               

 

             Ceftazidime (test                           R            



             code = 27)                                          

 

             Ceftriaxone (test                           R            



             code = 52)                                          

 

             Ertapenem (test code                           S            



             = 38)                                               

 

             Gentamicin (test code                           S            



             = 18)                                               

 

             Levofloxacin (test                           R            



             code = 22)                                          

 

             Meropenem (test code                           S            



             = 34)                                               

 

             Nitrofurantoin (test                           S            



             code = 23)                                          

 

             Piperacillin +                           R            



             Tazobactam (test code                                        



             = 29)                                               

 

             Tetracycline (test                           S            



             code = 2)                                           

 

             Tobramycin (test code                           S            



             = 25)                                               

 

             Trimethoprim +                           R            



             Sulfamethoxazole                                        



             (test code = 47)                                        

 

             CULTURE (BEAKER)                           A            Beta-hemoly

tic



             (test code = 1095)                                        streptoco

ccus



                                                                 group B, by



                                                                 serological



                                                                 grouping

 

             GRAM STAIN RESULT 3+ WBCs                                



             (BEAKER) (test code =                                        



             1123)                                               

 

             GRAM STAIN RESULT 1+ gram negative                           



             (BEAKER) (test code = rods                                   



             521607)                                             

 

             GRAM STAIN RESULT 2+ gram positive                           



             (BEAKER) (test code = rods                                   



             437898)                                             

 

             GRAM STAIN RESULT <1+ gram negative                           



             (BEAKER) (test code = coccobacilli                           



             985915)                                             

 

             GRAM STAIN RESULT 2+ gram positive                           



             (BEAKER) (test code = cocci in pairs                           



             706236)                                             



POCT-GLUCOSE [id number] 08:52:00





             Test Item    Value        Reference Range Interpretation Comments

 

             POC-GLUCOSE METER 209 mg/dL           H            : TESTED A

T [facility] [address]



             (BEAKER) (test code =                                        [facility],



             153)                                               [address]:



                                                                 /Techni

christina ID



                                                                 = [id number] for ADRIEL HARKINSE



POCT-GLUCOSE [id number] 21:26:00





             Test Item    Value        Reference Range Interpretation Comments

 

             POC-GLUCOSE METER 255 mg/dL           H            : TESTED A

T [facility] [address]



             (BEAKER) (test code =                                        [facility],



             153)                                               [address]:



                                                                 /Techni

christina ID



                                                                 = [id number] for CR

ISWELL,



                                                                 TIA



POCT-GLUCOSE [id number] 17:34:00





             Test Item    Value        Reference Range Interpretation Comments

 

             POC-GLUCOSE METER 227 mg/dL           H            : TESTED A

T [facility] [address]



             (BEAKER) (test code =                                        [facility],



             153)                                               [address]:



                                                                 /Techni

christina ID



                                                                 = [id number] for WASSERMAN

NNY,



                                                                 NAKITA



POCT-GLUCOSE [id number] 11:28:00





             Test Item    Value        Reference Range Interpretation Comments

 

             POC-GLUCOSE METER 282 mg/dL           H            : TESTED A

T [facility] [address]



             (BEAKER) (test code =                                        [facility],



             153)                                               [address]:



                                                                 /Techni

christina ID



                                                                 = [id number] for WASSERMAN

NNY,



                                                                 NAKITA



POCT-GLUCOSE [id number] 08:19:00





             Test Item    Value        Reference Range Interpretation Comments

 

             POC-GLUCOSE METER 329 mg/dL           H            : TESTED A

T [facility] [address]



             (BEAKER) (test code =                                        [facility],



             153)                                               [address]:



                                                                 /Techni

christina ID



                                                                 = [id number] for ANANT CATES



POCT-GLUCOSE [id number] 21:32:00





             Test Item    Value        Reference Range Interpretation Comments

 

             POC-GLUCOSE METER 275 mg/dL           H            : TESTED A

T [facility] [address]



             (BEAKER) (test code =                                        [facility],



             153)                                               [address]:



                                                                 /Techni

christina ID



                                                                 = [id number] for CR

ISWELL,



                                                                 TIA



POCT-GLUCOSE [id number] 17:47:00





             Test Item    Value        Reference Range Interpretation Comments

 

             POC-GLUCOSE METER 304 mg/dL           H            : TESTED A

T [facility] [address]



             (BEAKER) (test code =                                        [facility],



             1538)                                               [address]:



                                                                 /Techni

christina ID



                                                                 = [id number] for LUIZA FIGUEROA



URINE [id number] 15:21:00





             Test Item    Value        Reference Range Interpretation Comments

 

             CULTURE (BEAKER)                           A            >100,000 co

l/mL



             (test code = 1095)                                        Beta-hemo

lytic



                                                                 streptococcus g

roup



                                                                 B, by serologic

al



                                                                 grouping

 

             CULTURE (BEAKER) PROTEUS                   A            80-89,000 c

ol/mL



             (test code = 1095) MIRABILIS                              Proteus



                                                                 mirabilisESBL



                                                                 Positive

 

             Amikacin (test code =                           S            



             1)                                                  

 

             Ampicillin +                           R            



             Sulbactam (test code                                        



             = 6)                                                

 

             Aztreonam (test code                           R            



             = 32)                                               

 

             Cefepime (test code =                           R            



             51)                                                 

 

             Cefoxitin (test code                           R            



             = 68)                                               

 

             Ceftazidime (test                           R            



             code = 27)                                          

 

             Ceftriaxone (test                           R            



             code = 52)                                          

 

             Ertapenem (test code                           S            



             = 38)                                               

 

             Gentamicin (test code                           R            



             = 18)                                               

 

             Levofloxacin (test                           R            



             code = 22)                                          

 

             Meropenem (test code                           S            



             = 34)                                               

 

             Nitrofurantoin (test                           R            



             code = 23)                                          

 

             Tetracycline (test                           R            



             code = 2)                                           

 

             Tobramycin (test code                           R            



             = 25)                                               



POCT-GLUCOSE [id number] 12:16:00





             Test Item    Value        Reference Range Interpretation Comments

 

             POC-GLUCOSE METER 337 mg/dL           H            : TESTED A

[facility]



             (AKER) (test code =                                        [facility],



             1538)                                               [id number]:



                                                                 /Techni

christina ID



                                                                 = [id number] for LUIZA FIGUEROA



BASIC METABOLIC [id number] 10:39:00





             Test Item    Value        Reference Range Interpretation Comments

 

             SODIUM (BEAKER) 134 meq/L    136-145      L            



             (test code = 381)                                        

 

             POTASSIUM (BEAKER) 4.6 meq/L    3.5-5.1                   



             (test code = 379)                                        

 

             CHLORIDE (BEAKER) 105 meq/L                        



             (test code = 382)                                        

 

             CO2 (BEAKER) (test 20 meq/L     22-29        L            



             code = 355)                                         

 

             BLOOD UREA NITROGEN 14 mg/dL     7-21                      



             (BEAKER) (test code                                        



             = 354)                                              

 

             CREATININE (BEAKER) 0.97 mg/dL   0.57-1.25                 



             (test code = 358)                                        

 

             GLUCOSE RANDOM 429 mg/dL           HH           



             (BEAKER) (test code                                        



             = 652)                                              

 

             CALCIUM (BEAKER) 8.9 mg/dL    8.4-10.2                  



             (test code = 697)                                        

 

             EGFR (BEAKER) (test 107 mL/min/1.73                           ESTIM

ATED GFR IS



             code = 1092) sq m                                   NOT AS ACCURATE

 AS



                                                                 CREATININE



                                                                 CLEARANCE IN



                                                                 PREDICTING



                                                                 GLOMERULAR



                                                                 FILTRATION RATE

.



                                                                 ESTIMATED GFR I

S



                                                                 NOT APPLICABLE 

FOR



                                                                 DIALYSIS PATIEN

TS.



 ID - MAGAN FPOCT-GLUCOSE [id number] 08:29:00





             Test Item    Value        Reference Range Interpretation Comments

 

             POC-GLUCOSE METER 395 mg/dL           H            : TESTED A

T [facility] [address]



             (BEAKER) (test code =                                        [place],



             1538)                                               [id number]:



                                                                 /Techni

christina ID



                                                                 = [id number] for LUIZA FIGUEROA



CBC W/PLT COUNT &amp; AUTO [id number] 06:40:00





             Test Item    Value        Reference Range Interpretation Comments

 

             WHITE BLOOD CELL COUNT (BEAKER) 7.2 K/ L     3.5-10.5              

    



             (test code = 775)                                        

 

             RED BLOOD CELL COUNT (BEAKER) 5.48 M/ L    4.63-6.08               

  



             (test code = 761)                                        

 

             HEMOGLOBIN (BEAKER) (test code = 15.1 GM/DL   13.7-17.5            

     



             410)                                                

 

             HEMATOCRIT (BEAKER) (test code = 46.4 %       40.1-51.0            

     



             411)                                                

 

             MEAN CORPUSCULAR VOLUME (BEAKER) 84.7 fL      79.0-92.2            

     



             (test code = 753)                                        

 

             MEAN CORPUSCULAR HEMOGLOBIN 27.6 pg      25.7-32.2                 



             (BEAKER) (test code = 751)                                        

 

             MEAN CORPUSCULAR HEMOGLOBIN CONC 32.5 GM/DL   32.3-36.5            

     



             (BEAKER) (test code = 752)                                        

 

             RED CELL DISTRIBUTION WIDTH 15.5 %       11.6-14.4    H            



             (BEAKER) (test code = 412)                                        

 

             PLATELET COUNT (BEAKER) (test 315 K/CU MM  150-450                 

  



             code = 756)                                         

 

             MEAN PLATELET VOLUME (BEAKER) 10.5 fL      9.4-12.4                

  



             (test code = 754)                                        

 

             NUCLEATED RED BLOOD CELLS 0 /100 WBC   0-0                       



             (BEAKER) (test code = 413)                                        

 

             NEUTROPHILS RELATIVE PERCENT 62 %                                  

 



             (BEAKER) (test code = 429)                                        

 

             LYMPHOCYTES RELATIVE PERCENT 26 %                                  

 



             (BEAKER) (test code = 430)                                        

 

             MONOCYTES RELATIVE PERCENT 9 %                                    



             (BEAKER) (test code = 431)                                        

 

             EOSINOPHILS RELATIVE PERCENT 2 %                                   

 



             (BEAKER) (test code = 432)                                        

 

             BASOPHILS RELATIVE PERCENT 0 %                                    



             (BEAKER) (test code = 437)                                        

 

             NEUTROPHILS ABSOLUTE COUNT 4.48 K/ L    1.78-5.38                 



             (BEAKER) (test code = 670)                                        

 

             LYMPHOCYTES ABSOLUTE COUNT 1.87 K/ L    1.32-3.57                 



             (BEAKER) (test code = 414)                                        

 

             MONOCYTES ABSOLUTE COUNT (BEAKER) 0.62 K/ L    0.30-0.82           

      



             (test code = 415)                                        

 

             EOSINOPHILS ABSOLUTE COUNT 0.17 K/ L    0.04-0.54                 



             (BEAKER) (test code = 416)                                        

 

             BASOPHILS ABSOLUTE COUNT (BEAKER) 0.03 K/ L    0.01-0.08           

      



             (test code = 417)                                        

 

             IMMATURE GRANULOCYTES-RELATIVE 1 %          0-1                    

   



             PERCENT (BEAKER) (test code =                                      

  



             2801)                                               



POCT-GLUCOSE METER2020-01-10 19:55:00





             Test Item    Value        Reference Range Interpretation Comments

 

             POC-GLUCOSE METER 369 mg/dL           H            : TESTED A

T [facility] [address]



             (BEAKER) (test code =                                        [facility],



             153)                                               [id number]:



                                                                 /Techni

christina ID



                                                                 = [id number] for SHERRILL GUARDADO



POCT-GLUCOSE METER2020-01-10 16:45:00





             Test Item    Value        Reference Range Interpretation Comments

 

             POC-GLUCOSE METER 272 mg/dL           H            : TESTED A

T [facility] [address]



             (BEAKER) (test code =                                        [facility],



             1538)                                               [id number]:



                                                                 /Techni

christina ID



                                                                 = [id number] for TH

SARAH ANDERSON



POCT-GLUCOSE METER2020-01-10 11:40:00





             Test Item    Value        Reference Range Interpretation Comments

 

             POC-GLUCOSE METER 277 mg/dL           H            : TESTED A

T [facility] [address]



             (BEAKER) (test code =                                        [facility],



             153)                                               [id number]:



                                                                 /Techni

christina ID



                                                                 = [id number] for TH

ADAMSAS,



                                                                 SARAH



BASIC METABOLIC PANEL2020-01-10 10:22:00





             Test Item    Value        Reference Range Interpretation Comments

 

             SODIUM (BEAKER) 132 meq/L    136-145      L            



             (test code = 381)                                        

 

             POTASSIUM (BEAKER) 4.4 meq/L    3.5-5.1                   



             (test code = 379)                                        

 

             CHLORIDE (BEAKER) 103 meq/L                        



             (test code = 382)                                        

 

             CO2 (BEAKER) (test 21 meq/L     22-29        L            



             code = 355)                                         

 

             BLOOD UREA NITROGEN 14 mg/dL     7-21                      



             (BEAKER) (test code                                        



             = 354)                                              

 

             CREATININE (BEAKER) 0.89 mg/dL   0.57-1.25                 



             (test code = 358)                                        

 

             GLUCOSE RANDOM 428 mg/dL           HH           



             (BEAKER) (test code                                        



             = 652)                                              

 

             CALCIUM (BEAKER) 9.2 mg/dL    8.4-10.2                  



             (test code = 697)                                        

 

             EGFR (BEAKER) (test 119 mL/min/1.73                           ESTIM

ATED GFR IS



             code = 1092) sq m                                   NOT AS ACCURATE

 AS



                                                                 CREATININE



                                                                 CLEARANCE IN



                                                                 PREDICTING



                                                                 GLOMERULAR



                                                                 FILTRATION RATE

.



                                                                 ESTIMATED GFR I

S



                                                                 NOT APPLICABLE 

FOR



                                                                 DIALYSIS PATIEN

TS.



 ID - NTPLIPID PANEL2020-01-10 10:17:00





             Test Item    Value        Reference Range Interpretation Comments

 

             TRIGLYCERIDES (BEAKER) (test code = 692 mg/dL                      

        



             540)                                                

 

             CHOLESTEROL (BEAKER) (test code = 196 mg/dL                        

      



             631)                                                

 

             HDL CHOLESTEROL (BEAKER) (test code 24 mg/dL                       

        



             = 976)                                              



Calculated LDL not valid if triglyceride &gt;400 mg/dLTriglyceride Reference 
Range: Low Risk &lt;150Borderline 150-199 High Risk  200-499 Very High Risk 
&gt;=500Cholesterol Reference Range: Low Risk &lt;200 Borderline 200-239 High 
Risk &gt;240HDL Cholesterol Reference Range: Low Risk  &gt;=60 High Risk 
&lt;40LDL Cholesterol Reference Range: Optimal &lt;100 Near Optimal 100-129 
Borderline 130-159 High 160-189 Very High &gt;=190  ID - [id number]-GLUCOSE
METER2020-01-10 08:28:00





             Test Item    Value        Reference Range Interpretation Comments

 

             POC-GLUCOSE METER 388 mg/dL           H            : TESTED SALOME ARREOLA [facility] [address]



             (BEAKER) (test code =                                        [place],



             1538)                                               [id number]:



                                                                 /Techni

christina ID



                                                                 = [id number] for ANANT CATES



HEMOGLOBIN A1C2020-01-10 07:54:00





             Test Item    Value        Reference Range Interpretation Comments

 

             HEMOGLOBIN A1C (BEAKER) (test code = 10.4 %       4.3-6.1      H   

         



             368)                                                



CBC W/PLT COUNT &amp; AUTO DIFFERENTIAL2020-01-10 05:56:00





             Test Item    Value        Reference Range Interpretation Comments

 

             WHITE BLOOD CELL COUNT (BEAKER) 6.5 K/ L     3.5-10.5              

    



             (test code = 775)                                        

 

             RED BLOOD CELL COUNT (BEAKER) 5.21 M/ L    4.63-6.08               

  



             (test code = 761)                                        

 

             HEMOGLOBIN (BEAKER) (test code = 14.6 GM/DL   13.7-17.5            

     



             410)                                                

 

             HEMATOCRIT (BEAKER) (test code = 44.3 %       40.1-51.0            

     



             411)                                                

 

             MEAN CORPUSCULAR VOLUME (BEAKER) 85.0 fL      79.0-92.2            

     



             (test code = 753)                                        

 

             MEAN CORPUSCULAR HEMOGLOBIN 28.0 pg      25.7-32.2                 



             (BEAKER) (test code = 751)                                        

 

             MEAN CORPUSCULAR HEMOGLOBIN CONC 33.0 GM/DL   32.3-36.5            

     



             (BEAKER) (test code = 752)                                        

 

             RED CELL DISTRIBUTION WIDTH 15.6 %       11.6-14.4    H            



             (BEAKER) (test code = 412)                                        

 

             PLATELET COUNT (BEAKER) (test 294 K/CU MM  150-450                 

  



             code = 756)                                         

 

             MEAN PLATELET VOLUME (BEAKER) 11.2 fL      9.4-12.4                

  



             (test code = 754)                                        

 

             NUCLEATED RED BLOOD CELLS 0 /100 WBC   0-0                       



             (BEAKER) (test code = 413)                                        

 

             NEUTROPHILS RELATIVE PERCENT 56 %                                  

 



             (BEAKER) (test code = 429)                                        

 

             LYMPHOCYTES RELATIVE PERCENT 31 %                                  

 



             (BEAKER) (test code = 430)                                        

 

             MONOCYTES RELATIVE PERCENT 10 %                                   



             (BEAKER) (test code = 431)                                        

 

             EOSINOPHILS RELATIVE PERCENT 2 %                                   

 



             (BEAKER) (test code = 432)                                        

 

             BASOPHILS RELATIVE PERCENT 1 %                                    



             (BEAKER) (test code = 437)                                        

 

             NEUTROPHILS ABSOLUTE COUNT 3.66 K/ L    1.78-5.38                 



             (BEAKER) (test code = 670)                                        

 

             LYMPHOCYTES ABSOLUTE COUNT 2.03 K/ L    1.32-3.57                 



             (BEAKER) (test code = 414)                                        

 

             MONOCYTES ABSOLUTE COUNT (BEAKER) 0.63 K/ L    0.30-0.82           

      



             (test code = 415)                                        

 

             EOSINOPHILS ABSOLUTE COUNT 0.15 K/ L    0.04-0.54                 



             (BEAKER) (test code = 416)                                        

 

             BASOPHILS ABSOLUTE COUNT (BEAKER) 0.03 K/ L    0.01-0.08           

      



             (test code = 417)                                        

 

             IMMATURE GRANULOCYTES-RELATIVE 1 %          0-1                    

   



             PERCENT ([facility]) (test code =                                      

  



             2801)                                               



POCT-GLUCOSE [id number] 23:47:00





             Test Item    Value        Reference Range Interpretation Comments

 

             POC-GLUCOSE METER 359 mg/dL           H            : Notified

 RN/MD:



             ([facility]) (test code =                                        TESTED

 AT [place] [address]



             153)                                               [facility],



                                                                 [address]:



                                                                 /Techni

christina ID



                                                                 = [id number] for



                                                                 LATGIBRANRIDJOHNNY, ALESSIA

ICE



POCT-GLUCOSE [id number] 21:13:00





             Test Item    Value        Reference Range Interpretation Comments

 

             POC-GLUCOSE METER 315 mg/dL           H            : TESTED A

T [facility] [address]



             ([facility]) (test code =                                        [facility],



             153)                                               [id number]:



                                                                 /Techni

christina ID



                                                                 = [id number] for Sm

ith,



                                                                 Nicki



POCT-GLUCOSE [id number] 21:13:00





             Test Item    Value        Reference Range Interpretation Comments

 

             POC-GLUCOSE METER 331 mg/dL           H            : TESTED A

[facility]



             ([facility]) (test code =                                        [facility],



             153)                                               [address]:



                                                                 /Techni

christina ID



                                                                 = [id number] for RO

SHIELA,



                                                                 DAVIDECA



POCT-GLUCOSE [id number] 10:30:00





             Test Item    Value        Reference Range Interpretation Comments

 

             POC-GLUCOSE METER 341 mg/dL           H            : TESTED A

T [facility] [address]



             ([facility]) (test code =                                        [facility],



             153)                                               [address]:



                                                                 /Techni

christina ID



                                                                 = [id number] for Sm

ith,



                                                                 Nicki



CBC W/PLT COUNT &amp; AUTO [id number] 08:48:00





             Test Item    Value        Reference Range Interpretation Comments

 

             WHITE BLOOD CELL COUNT ([facility]) 6.7 K/ L     3.5-10.5              

    



             (test code = 775)                                        

 

             RED BLOOD CELL COUNT (AKER) 5.28 M/ L    4.63-6.08               

  



             (test code = 761)                                        

 

             HEMOGLOBIN ([facility]) (test code = 14.6 GM/DL   13.7-17.5            

     



             410)                                                

 

             HEMATOCRIT ([facility]) (test code = 44.9 %       40.1-51.0            

     



             411)                                                

 

             MEAN CORPUSCULAR VOLUME (BEAKER) 85.0 fL      79.0-92.2            

     



             (test code = 753)                                        

 

             MEAN CORPUSCULAR HEMOGLOBIN 27.7 pg      25.7-32.2                 



             (BEAKER) (test code = 751)                                        

 

             MEAN CORPUSCULAR HEMOGLOBIN CONC 32.5 GM/DL   32.3-36.5            

     



             (BEAKER) (test code = 752)                                        

 

             RED CELL DISTRIBUTION WIDTH 15.3 %       11.6-14.4    H            



             (BEAKER) (test code = 412)                                        

 

             PLATELET COUNT (BEAKER) (test 300 K/CU MM  150-450                 

  



             code = 756)                                         

 

             MEAN PLATELET VOLUME (BEAKER) 10.4 fL      9.4-12.4                

  



             (test code = 754)                                        

 

             NUCLEATED RED BLOOD CELLS 0 /100 WBC   0-0                       



             (BEAKER) (test code = 413)                                        



(MANUAL DIFFERENTIAL)2020 08:48:00





             Test Item    Value        Reference Range Interpretation Comments

 

             NEUTROPHILS - REL (DIFF) (BEAKER) 69 %                             

      



             (test code = 1359)                                        

 

             LYMPHOCYTES - REL (DIFF) (BEAKER) 25 %                             

      



             (test code = 1360)                                        

 

             MONOCYTES - REL (DIFF) (BEAKER) 3 %                                

    



             (test code = 1361)                                        

 

             EOSINOPHILS - REL (DIFF) (BEAKER) 3 %                              

      



             (test code = 1362)                                        

 

             BASOPHILS - REL (DIFF) (BEAKER) 0 %                                

    



             (test code = 1363)                                        

 

             NEUTROPHILS - ABS (DIFF) (BEAKER) 4.62 K/ L    1.80-8.00           

      



             (test code = 1365)                                        

 

             LYMPHOCYTES - ABS (DIFF) (BEAKER) 1.68 K/ L    1.48-4.50           

      



             (test code = 1366)                                        

 

             MONOCYTES - ABS (DIFF) (BEAKER) 0.20 K/ L    0.00-1.30             

    



             (test code = 1367)                                        

 

             EOSINOPHILS - ABS (DIFF) (BEAKER) 0.20 K/ L    0.00-0.50           

      



             (test code = 1368)                                        

 

             BASOPHILS - ABS (DIFF) (BEAKER) 0.00 K/ L    0.00-0.20             

    



             (test code = 1369)                                        

 

             TOTAL COUNTED (BEAKER) (test code = 100                            

        



             1351)                                               

 

             PLT MORPHOLOGY (BEAKER) (test code Normal                          

       



             = 486)                                              

 

             RBC MORPHOLOGY (BEAKER) (test code Normal                          

       



             = 762)                                              

 

             SMUDGE CELLS (BEAKER) (test code = Present                         

       



             1371)                                               



HEMOGLOBIN [id number] 06:39:00





             Test Item    Value        Reference Range Interpretation Comments

 

             HEMOGLOBIN A1C (BEAKER) (test code = 10.5 %       4.3-6.1      H   

         



             368)                                                



LIPID [id number] 04:57:00





             Test Item    Value        Reference Range Interpretation Comments

 

             TRIGLYCERIDES (BEAKER) 1251 mg/dL                             Speci

men moderately



             (test code = 540)                                        hemolyzed

 

             CHOLESTEROL (BEAKER) 215 mg/dL                              Specime

n moderately



             (test code = 631)                                        hemolyzed

 

             HDL CHOLESTEROL 22 mg/dL                               



             (BEAKER) (test code =                                        



             976)                                                



Calculated LDL not valid if triglyceride &gt;400 mg/dLTriglyceride Reference 
Range: Low Risk &lt;150Borderline 150-199 High Risk 200-499 Very High Risk 
&gt;=500Cholesterol Reference Range: Low Risk &lt;200 Borderline 200-239 High 
Risk &gt;240HDL Cholesterol Reference Range: Low Risk &gt;=60 High Risk&lt;40LDL
Cholesterol Reference Range: Optimal &lt;100 Near Optimal 100-129 Borderline 
130-159 High 160-189 Very High &gt;=190 Specimen moderately lipemicBASIC 
METABOLIC [id number] 04:56:00





             Test Item    Value        Reference Range Interpretation Comments

 

             SODIUM (BEAKER) 137 meq/L    136-145                   



             (test code = 381)                                        

 

             POTASSIUM (BEAKER) 4.6 meq/L    3.5-5.1                   Specimen 

moderately



             (test code = 379)                                        hemolyzed

 

             CHLORIDE (BEAKER) 106 meq/L                        



             (test code = 382)                                        

 

             CO2 (BEAKER) (test 20 meq/L     22-29        L            



             code = 355)                                         

 

             BLOOD UREA NITROGEN 12 mg/dL     7-21                      



             (BEAKER) (test code                                        



             = 354)                                              

 

             CREATININE (BEAKER) 0.95 mg/dL   0.57-1.25                 Specimen

 moderately



             (test code = 358)                                        hemolyzed

 

             GLUCOSE RANDOM 398 mg/dL           H            



             (BEAKER) (test code                                        



             = 652)                                              

 

             CALCIUM (BEAKER) 8.8 mg/dL    8.4-10.2                  



             (test code = 697)                                        

 

             EGFR (BEAKER) (test 110 mL/min/1.73                           ESTIM

ATED GFR IS



             code = 1092) sq m                                   NOT AS ACCURATE

 AS



                                                                 CREATININE



                                                                 CLEARANCE IN



                                                                 PREDICTING



                                                                 GLOMERULAR



                                                                 FILTRATION RATE

.



                                                                 ESTIMATED GFR I

S



                                                                 NOT APPLICABLE 

FOR



                                                                 DIALYSIS PATIEN

TS.



POCT-GLUCOSE [id number] 21:53:00





             Test Item    Value        Reference Range Interpretation Comments

 

             POC-GLUCOSE METER > mg/dL             HH           : Notified

 RN/MD: TESTED



             (BEAKER) (test code =                                        AT [facility] [address] [facility]



             1538)                                               [facility], [address]

30:



                                                                 /Techni

christina ID =



                                                                 [id number] for DENN

IS, ZECHARIAH



U/S, ABDOMINAL, [id number] 19:54:00Reason for exam:-&gt;Evaluation of  
shunt and for possible cyst or pseudocyst Should this be performed at the 
bedside?-&gt;YesFINAL REPORT PATIENT ID: [id number] Limited abdominal ultrasound. 
CLINICAL HISTORY: Evaluation of  shunt for possible cyst or pseudocyst. 
COMPARISON STUDY: None available. FINDINGS: Sonographic assessment of the 
abdomen was performed assessing for fluid in the region of the patient's shunts.
No fluid collections are seen. However, the study is limited by the patient's 
body habitus. CT scan would be more sensitive. Signed: Madison Grewal MDReport 
Verified Date/Time: 2020 19:54:10 Reading Location: [address] Consult 
Reading Room Electronically signed by: MADISON GREWAL M.D. on 2020 07:54 
PMPOCT-GLUCOSE [id number] 19:34:00





             Test Item    Value        Reference Range Interpretation Comments

 

             POC-GLUCOSE METER 474 mg/dL           HH           : Notified

 RN/MD:



             (KUN) (test code =                                        TESTED

 AT [place] [address]



             1535)                                               [facility],



                                                                 [id number]:



                                                                 /Techni

christina ID



                                                                 = [id number] for AK

SENAITULONA



URINALYSIS W/ REFLEX URINE [id number] 17:48:00





             Test Item    Value        Reference Range Interpretation Comments

 

             COLOR (BEAKER) (test code = 470) Yellow                            

     

 

             CLARITY (BEAKER) (test code = Hazy                                 

  



             469)                                                

 

             SPECIFIC GRAVITY UA (BEAKER) 1.020        1.001-1.035              

 



             (test code = 468)                                        

 

             PH UA (BEAKER) (test code = 467) 6.0          5.0-8.0              

     

 

             PROTEIN UA (BEAKER) (test code = 20 mg/dL     Negative     A       

     



             464)                                                

 

             GLUCOSE UA (BEAKER) (test code = >1000 mg/dL  Negative     A       

     



             365)                                                

 

             KETONES UA (BEAKER) (test code = 40 mg/dL     Negative     A       

     



             371)                                                

 

             BILIRUBIN UA (BEAKER) (test code Negative     Negative             

     



             = 462)                                              

 

             BLOOD UA (BEAKER) (test code = Moderate     Negative     A         

   



             461)                                                

 

             NITRITE UA (BEAKER) (test code = Positive     Negative     A       

     



             465)                                                

 

             LEUKOCYTE ESTERASE UA (BEAKER) Large        Negative     A         

   



             (test code = 466)                                        

 

             UROBILINOGEN UA (BEAKER) (test 0.2 mg/dL    0.2-1.0                

   



             code = 463)                                         

 

             RBC UA (BEAKER) (test code = 519) 0 /HPF                           

      

 

             WBC UA (BEAKER) (test code = 520) 267 /HPF                         

      

 

             BACTERIA (BEAKER) (test code = Moderate                            

   



             517)                                                

 

             SOURCE(BEAKER) (test code = 1995)                                  

      



CBC W/PLT COUNT &amp; AUTO [id number] 17:38:00





             Test Item    Value        Reference Range Interpretation Comments

 

             WHITE BLOOD CELL COUNT (BEAKER) 7.8 K/ L     3.5-10.5              

    



             (test code = 775)                                        

 

             RED BLOOD CELL COUNT (BEAKER) 5.12 M/ L    4.63-6.08               

  



             (test code = 761)                                        

 

             HEMOGLOBIN (BEAKER) (test code = 14.7 GM/DL   13.7-17.5            

     



             410)                                                

 

             HEMATOCRIT (BEAKER) (test code = 43.3 %       40.1-51.0            

     



             411)                                                

 

             MEAN CORPUSCULAR VOLUME (BEAKER) 84.6 fL      79.0-92.2            

     



             (test code = 753)                                        

 

             MEAN CORPUSCULAR HEMOGLOBIN 28.7 pg      25.7-32.2                 



             (BEAKER) (test code = 751)                                        

 

             MEAN CORPUSCULAR HEMOGLOBIN CONC 33.9 GM/DL   32.3-36.5            

     



             (BEAKER) (test code = 752)                                        

 

             RED CELL DISTRIBUTION WIDTH 15.3 %       11.6-14.4    H            



             (BEAKER) (test code = 412)                                        

 

             PLATELET COUNT (BEAKER) (test 344 K/CU MM  150-450                 

  



             code = 756)                                         

 

             MEAN PLATELET VOLUME (BEAKER) 11.0 fL      9.4-12.4                

  



             (test code = 754)                                        

 

             NUCLEATED RED BLOOD CELLS 0 /100 WBC   0-0                       



             (BEAKER) (test code = 413)                                        



(CELLAVISION MANUAL DIFF)2020 17:38:00





             Test Item    Value        Reference Range Interpretation Comments

 

             NEUTROPHILS - REL 63 %                                   



             (CELLAVISION)(BEAKER) (test code                                   

     



             = 2816)                                             

 

             LYMPHOCYTES - REL 23 %                                   



             (CELLAVISION)(BEAKER) (test code                                   

     



             = 2817)                                             

 

             MONOCYTES - REL 6 %                                    



             (CELLAVISION)(BEAKER) (test code                                   

     



             = 2818)                                             

 

             EOSINOPHILS - REL 5 %                                    



             (CELLAVISION)(BEAKER) (test code                                   

     



             = 2819)                                             

 

             BASOPHILS - REL 1 %                                    



             (CELLAVISION)(BEAKER) (test code                                   

     



             = 2820)                                             

 

             BANDS - REL (CELLAVISION)(BEAKER) 2 %          0-10                

      



             (test code = 2826)                                        

 

             NEUTROPHILS - ABS 4.91 K/ul    1.78-5.38                 



             (CELLAVISION)(BEAKER) (test code                                   

     



             = 2830)                                             

 

             LYMPHOCYTES - ABS 1.79 K/ul    1.32-3.57                 



             (CELLAVISION)(BEAKER) (test code                                   

     



             = 2831)                                             

 

             MONOCYTES - ABS 0.47 K/uL    0.30-0.82                 



             (CELLAVISION)(BEAKER) (test code                                   

     



             = 2832)                                             

 

             EOSINOPHILS - ABS 0.39 K/uL    0.04-0.54                 



             (CELLAVISION)(BEAKER) (test code                                   

     



             = 2834)                                             

 

             BASOPHILS - ABS 0.08 K/uL    0.01-0.08                 



             (CELLAVISION)(BEAKER) (test code                                   

     



             = 2835)                                             

 

             BANDS - ABS (CELLAVISION)(BEAKER) 0.16 K/uL    0.00-0.80           

      



             (test code = 2840)                                        

 

             TOTAL COUNTED (BEAKER) (test code 100                              

      



             = 1351)                                             

 

             SMUDGE CELLS (BEAKER) (test code Present                           

     



             = 1371)                                             

 

             GIANT PLATELETS (BEAKER) (test Present                             

   



             code = 313)                                         

 

             ANISOCYTOSIS (BEAKER) (test code 1+ few                            

     



             = 961)                                              

 

             POIKILOCYTES (BEAKER) (test code 2+ moderate                       

     



             = 966)                                              

 

             SPHEROCYTES (BEAKER) (test code = 1+ few                           

      



             768)                                                

 

             OVALOCYTES (BEAKER) (test code = 1+ few                            

     



             477)                                                

 

             TEAR DROP CELLS (BEAKER) (test 1+ few                              

   



             code = 481)                                         

 

             ARTIFACT (CELLAVISION)(BEAKER) Present                             

   



             (test code = 3432)                                        

 

             PLATELET CONCENTRATION Adequate                               



             (CELLAVISION)(BEAKER) (test code                                   

     



             = 3438)                                             



Received comment: User comments: Slide comments:POCT-GLUCOSE [id number] 
16:27:00





             Test Item    Value        Reference Range Interpretation Comments

 

             POC-GLUCOSE METER 424 mg/dL           HH           : Notified

 RN/MD:



             (KUN) (test code =                                        TESTED

 AT [facility] [address] [address])                                               [place] [facility],



                                                                 [address]:



                                                                 /Techni

christina ID



                                                                 = [id number] for [place]

LONA DUNNE



CT, BRAIN, WITHOUT [id number] 15:31:00FINAL REPORT PATIENT ID: [id number] 
CT, BRAIN, WITHOUT CONTRAST CLINICAL INDICATION: Hydrocephalus COMPARISON: None 
TECHNIQUE: Noncontrast axial CT imaging of the brain and skull. DOSE REDUCTION: 
Dose modulation, iterative reconstruction, and/or weight-based adjustment of the
mA/kV was utilized to reduce the radiation dose to as low as reasonably 
achievable. FINDINGS:A total of two ventricular drainagecatheters are present. A
right transverse temporal catheter terminates across the midline just abovethe 
level of the foramen of Monro. A right parietal approach catheter terminates in 
the pineal region. Supratentorial ventricular configuration is slitlike. There 
is no herniation. The basilar cisternsare preserved. There is no identifiable 
recent infarct. 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 
partial callosal agenesis. Calvarial configuration escape of cephalic. There is 
no acute osseous abnormality. IMPRESSION: Chronic, likely congenital parenchymal
changes without recent infarct or hemorrhage. A total of two ventricular 
drainage catheters are present. Supratentorial ventricular configuration is 
slitlike and may represent over shunting. Correlation recommended. Signed:
JR Rae Robert MDReport Verified Date/Time: 2020 15:31:08 
Reading Location: [address] Neuro Reading Room Electronically signed by: 
ALONDRA RAE on 2020 03:31 PMRAD, SHUNT [id number] 
15:13:00Reason for exam:-&gt;concern for VPS malfunctionFINAL REPORT PATIENT ID:
[id number] RAD, SHUNT SERIES INDICATION: concern for VPS malfunction COMPARISON: 
None TECHNIQUE: AP and lateral radiographs of the skull, abdomen and chest were 
acquired to evaluate shunt catheter FINDINGS:An abandoned shunt catheter is 
present within the right neck. Medial to this a second shunt catheter is present
which descends along the left chest wall, terminating within the mid pelvis. 
Radiopaque portions of the catheter appear contiguous. Signed: Lisa Reyesort Verified Date/Time: 2020 15:13:54 Reading Location: [facility]
Radiology Reading Room Electronically signed by: LISA REYES MD on
2020 03:13 PMPOCT-GLUCOSE [id number] 11:38:00





             Test Item    Value        Reference Range Interpretation Comments

 

             POC-GLUCOSE METER 394 mg/dL           H            : TESTED A

T [facility] [address]



             (BEAKER) (test code =                                        [facility]

[place] [facility],



             [address])                                               [id number]:



                                                                 /Techni

christina ID



                                                                 = [id number] for LONA URIOSTEGUI



HEMOGLOBIN [id number] 09:15:00





             Test Item    Value        Reference Range Interpretation Comments

 

             HEMOGLOBIN A1C (BEAKER) (test code = 10.4 %       4.3-6.1      H   

         



             368)                                                



TSH/FREE T4 IF [id number] 07:54:00





             Test Item    Value        Reference Range Interpretation Comments

 

             THYROID STIMULATING HORMONE 1.40 uIU/mL  0.35-4.94                 



             (BEAKER) (test code = 772)                                        



POCT-GLUCOSE [id number] 07:48:00





             Test Item    Value        Reference Range Interpretation Comments

 

             POC-GLUCOSE METER > mg/dL             HH           : Notified

 RN/MD: TESTED



             (BEAKER) (test code =                                        AT [facility] [address] [facility]



             [address]38)                                               [facility], [address]

30:



                                                                 /Techni

christina ID =



                                                                 [id number] for LONA GOLDSMITH



BASIC METABOLIC [id number] 07:44:00





             Test Item    Value        Reference Range Interpretation Comments

 

             SODIUM (BEAKER) 134 meq/L    136-145      L            



             (test code = 381)                                        

 

             POTASSIUM (BEAKER) 4.4 meq/L    3.5-5.1                   



             (test code = 379)                                        

 

             CHLORIDE (BEAKER) 103 meq/L                        



             (test code = 382)                                        

 

             CO2 (BEAKER) (test 20 meq/L     22-29        L            



             code = 355)                                         

 

             BLOOD UREA NITROGEN 17 mg/dL     7-21                      



             (BEAKER) (test code                                        



             = 354)                                              

 

             CREATININE (BEAKER) 1.09 mg/dL   0.57-1.25                 



             (test code = 358)                                        

 

             GLUCOSE RANDOM 464 mg/dL           HH           



             (BEAKER) (test code                                        



             = 652)                                              

 

             CALCIUM (BEAKER) 8.6 mg/dL    8.4-10.2                  



             (test code = 697)                                        

 

             EGFR (BEAKER) (test 94 mL/min/1.73                           ESTIMA

HUMA GFR IS



             code = 1092) sq m                                   NOT AS ACCURATE

 AS



                                                                 CREATININE



                                                                 CLEARANCE IN



                                                                 PREDICTING



                                                                 GLOMERULAR



                                                                 FILTRATION RATE

.



                                                                 ESTIMATED GFR I

S



                                                                 NOT APPLICABLE 

FOR



                                                                 DIALYSIS PATIEN

TS.



LIPID [id number] 07:34:00





             Test Item    Value        Reference Range Interpretation Comments

 

             TRIGLYCERIDES (BEAKER) (test code = 750 mg/dL                      

        



             540)                                                

 

             CHOLESTEROL (BEAKER) (test code = 196 mg/dL                        

      



             631)                                                

 

             HDL CHOLESTEROL (BEAKER) (test code 22 mg/dL                       

        



             = 976)                                              



Calculated LDL not valid if triglyceride &gt;400 mg/dLTriglyceride Reference 
Range: Low Risk &lt;150Borderline 150-199 High Risk 200-499 Very High Risk 
&gt;=500Cholesterol Reference Range: Low Risk &lt;200 Borderline 200-239 High 
Risk &gt;240HDL Cholesterol Reference Range: Low Risk &gt;=60 High Risk&lt;40LDL
Cholesterol Reference Range: Optimal &lt;100 Near Optimal 100-129 Borderline 
130-159 High 160-189 Very High &gt;=190POCT-GLUCOSE [id number] 00:49:00





             Test Item    Value        Reference Range Interpretation Comments

 

             POC-GLUCOSE METER 399 mg/dL           H            : TESTED A

T [facility] [address]



             (BEAKER) (test code =                                        MILY NELSON [facility],



             1538)                                               [address]:



                                                                 /Techni

christina ID



                                                                 = [id number] for CLAY BATRES



RAD, FOOT, 2 VIEWS, [id number] 22:28:00Reason for exam:-&gt;osteomyletitis
FINAL REPORT PATIENT ID: [id number] TECHNIQUE: Two views each of the bilateral 
feet HISTORY: osteomyletitis. COMPARISON: None. IMPRESSION:No acute displaced 
fracture or dislocation. Joint spaces are within normal limits.Severe soft 
tissue swelling.On the right subcentimeter nonspecific calcifications adjacent 
to the heel plantar aspect. Correlate with physical exam. Severely limited exam 
due to patientbody habitus. No definite cortical irregularity.There is clinical 
concern for osteomyelitis, recommend MRI with contrast. Signed: Sriram Valeraeport Verified Date/Time: 2020 22:28:25 Reading Location: [facility] C013W 
Consult Reading Room Electronically signed by: SRIRAM VALERA DO on
2020 10:28 PMRAD, FOOT, 2 VIEWS, [id number] 22:28:00Reason for 
exam:-&gt;osteomyletitsFINAL REPORT PATIENT ID: [id number] TECHNIQUE: Two views 
each of the bilateral feet HISTORY: osteomyletitis. COMPARISON: None. 
IMPRESSION:No acute displaced fracture or dislocation. Joint spaces are within 
normal limits.Severe soft tissue swelling.On the right subcentimeter nonspecific
calcifications adjacent to the heel plantar aspect. Correlate with physical 
exam. Severely limited exam due to patientbody habitus. No definite cortical 
irregularity.There is clinical concern for osteomyelitis, recommend MRI with 
contrast. Signed: Sriram Valeraort Verified Date/Time: 2020 22:28:25
Reading Location: [facility] C013W Consult Reading Room Electronically signed by: 
SRIRAM VALERA DO on2020 10:28 PMPOCT-GLUCOSE [id number] 
21:04:00





             Test Item    Value        Reference Range Interpretation Comments

 

             POC-GLUCOSE METER 430 mg/dL           HH           : Notified

 RN/MD:



             (KUN) (test code =                                        TESTED

 AT [facility] [address]



             1538)                                               [facility],



                                                                 [id number]:



                                                                 /Techni

christina ID



                                                                 = [id number] for DEYSI ADRIAN



ERTAPENEM:SUSC:PT:ISOLATE:ORDQN:[id number] 16:48:00





             Test Item    Value        Reference Range Interpretation Comments

 

             Culture: Urine (test >100,000 CFU/mL Proteus                       

    



             code = Culture: mirabilis 10,000 -                           



             Urine)       50,000 CFU/mL Skin Jaime                           



[facility]ATE:ORDQN:[id number] 16:48:00





             Test Item    Value        Reference Range Interpretation Comments

 

             Proteus mirabilis (test Proteus mirabilis                          

 



             code = Proteus mirabilis)                                        



[facility] [id number] 16:48:00





             Test Item    Value        Reference Range Interpretation Comments

 

             UA Nitrite (test code Negative (18 10:48                      

     



             = UA Nitrite) AM)                                    



[facility] AND [id number] 16:48:00





             Test Item    Value        Reference Range Interpretation Comments

 

             UA Bili (test code = Negative *NA*(18                         

  



             UA Bili)     10:48 AM)                              



[facility] AND [id number] 16:48:00





             Test Item    Value        Reference Range Interpretation Comments

 

             UA Ketones (test code Negative *NA*(18                        

   



             = UA Ketones) 10:48 AM)                              



[facility] AND [id number] 16:48:00





             Test Item    Value        Reference Range Interpretation Comments

 

             UA Blood (test code = Trace *ABN*(18                          

 



             UA Blood)    10:48 AM)                              



[facility] AND [id number] 16:48:00





             Test Item    Value        Reference Range Interpretation Comments

 

             UA Urobilinogen (test code = UA 0.2          0.1-1.0               

    



             Urobilinogen)                                        



[facility] AND [id number] 16:48:00





             Test Item    Value        Reference Range Interpretation Comments

 

             UA Leuk Est (test code Large *ABN*(18                         

  



             = UA Leuk Est) 10:48 AM)                              



[facility] AND [id number] 16:48:00





             Test Item    Value        Reference Range Interpretation Comments

 

             UA Protein (test code Negative (18 10:48                      

     



             = UA Protein) AM)                                    



[facility] AND [id number] 16:48:00





             Test Item    Value        Reference Range Interpretation Comments

 

             UA Glucose (test code Negative (18 10:48                      

     



             = UA Glucose) AM)                                    



[facility] AND [id number] 16:48:00





             Test Item    Value        Reference Range Interpretation Comments

 

             UA pH (test code = UA pH) 7.0 1        5.0-8.0                   



[facility] AND [id number] 16:48:00





             Test Item    Value        Reference Range Interpretation Comments

 

             UA Spec Grav (test code = UA Spec 1.015 1                          

      



             Grav)                                               



[facility] AND [id number] 16:48:00





             Test Item    Value        Reference Range Interpretation Comments

 

             UA Color (test code = Yellow *NA*(18                          

 



             UA Color)    10:48 AM)                              



[facility] AND [id number] 16:48:00





             Test Item    Value        Reference Range Interpretation Comments

 

             UA Turbidity (test code = Clear (18 10:48                     

      



             UA Turbidity) AM)                                    



[facility] AND [id number] 16:48:00





             Test Item    Value        Reference Range Interpretation Comments

 

             UA Mucus (test code = UA Mucus) Few /LPF                           

    



[facility] AND [id number] 16:48:00





             Test Item    Value        Reference Range Interpretation Comments

 

             UA Bacteria (test code = UA Few /HPF                               



             Bacteria)                                           



[facility] AND [id number] 16:48:00





             Test Item    Value        Reference Range Interpretation Comments

 

             UA RBC (test code = 0-2 /HPF     See_Comment                [Automa

huma message] The



             UA RBC)                                             system which ge

nerated



                                                                 this result tra

nsmitted



                                                                 reference range

: <=2. The



                                                                 reference range

 was not



                                                                 used to interpr

et this



                                                                 result as zayda

l/abnormal.



[facility] AND [id number] 16:48:00





             Test Item    Value        Reference Range Interpretation Comments

 

             UA Sq Epi (test code = None Seen (18                          

 



             UA Sq Epi)   10:48 AM)                              



[facility] AND [id number] 16:48:00





             Test Item    Value        Reference Range Interpretation Comments

 

             UA WBC (test code = UA WBC)  /HPF                            



[facility]ORDQN:[id number] 16:48:00





             Test Item    Value        Reference Range Interpretation Comments

 

             Culture: Urine (test >100,000 CFU/mL Proteus                       

    



             code = Culture: mirabilis 10,000 -                           



             Urine)       50,000 CFU/mL Skin Jaime                           



[facility]:ISOLATE:ORDQN:[id number] 16:48:00





             Test Item    Value        Reference Range Interpretation Comments

 

             Proteus mirabilis (test Proteus mirabilis                          

 



             code = Proteus mirabilis)                                        



[facility] AND [id number] 16:48:00





             Test Item    Value        Reference Range Interpretation Comments

 

             UA Nitrite (test code Negative (18 10:48                      

     



             = UA Nitrite) AM)                                    



[facility] AND [id number] 16:48:00





             Test Item    Value        Reference Range Interpretation Comments

 

             UA Bili (test code = Negative *NA*(18                         

  



             UA Bili)     10:48 AM)                              



[facility] AND [id number] 16:48:00





             Test Item    Value        Reference Range Interpretation Comments

 

             UA Ketones (test code Negative *NA*(18                        

   



             = UA Ketones) 10:48 AM)                              



[facility] AND [id number] 16:48:00





             Test Item    Value        Reference Range Interpretation Comments

 

             UA Blood (test code = Trace *ABN*(18                          

 



             UA Blood)    10:48 AM)                              



[facility] AND [id number] 16:48:00





             Test Item    Value        Reference Range Interpretation Comments

 

             UA Urobilinogen (test code = UA 0.2          0.1-1.0               

    



             Urobilinogen)                                        



[facility] AND [id number] 16:48:00





             Test Item    Value        Reference Range Interpretation Comments

 

             UA Leuk Est (test code Large *ABN*(18                         

  



             = UA Leuk Est) 10:48 AM)                              



[facility] AND [id number] 16:48:00





             Test Item    Value        Reference Range Interpretation Comments

 

             UA Protein (test code Negative (18 10:48                      

     



             = UA Protein) AM)                                    



[facility] AND [id number] 16:48:00





             Test Item    Value        Reference Range Interpretation Comments

 

             UA Glucose (test code Negative (18 10:48                      

     



             = UA Glucose) AM)                                    



[facility] AND [id number] 16:48:00





             Test Item    Value        Reference Range Interpretation Comments

 

             UA pH (test code = UA pH) 7.0 1        5.0-8.0                   



[facility] AND [id number] 16:48:00





             Test Item    Value        Reference Range Interpretation Comments

 

             UA Spec Grav (test code = UA Spec 1.015 1                          

      



             Grav)                                               



[facility] AND [id number] 16:48:00





             Test Item    Value        Reference Range Interpretation Comments

 

             UA Color (test code = Yellow *NA*(18                          

 



             UA Color)    10:48 AM)                              



[facility] AND [id number] 16:48:00





             Test Item    Value        Reference Range Interpretation Comments

 

             UA Turbidity (test code = Clear (18 10:48                     

      



             UA Turbidity) AM)                                    



[facility] AND [id number] 16:48:00





             Test Item    Value        Reference Range Interpretation Comments

 

             UA Mucus (test code = UA Mucus) Few /LPF                           

    



[facility] AND [id number] 16:48:00





             Test Item    Value        Reference Range Interpretation Comments

 

             UA Bacteria (test code = UA Few /HPF                               



             Bacteria)                                           



[facility] AND [id number] 16:48:00





             Test Item    Value        Reference Range Interpretation Comments

 

             UA RBC (test code = 0-2 /HPF     See_Comment                [Automa

huma message] The



             UA RBC)                                             system which ge

nerated



                                                                 this result tra

nsmitted



                                                                 reference range

: <=2. The



                                                                 reference range

 was not



                                                                 used to interpr

et this



                                                                 result as zayda

l/abnormal.



[facility] AND [id number] 16:48:00





             Test Item    Value        Reference Range Interpretation Comments

 

             UA Sq Epi (test code = None Seen (18                          

 



             UA Sq Epi)   10:48 AM)                              



[facility] AND [id number] 16:48:00





             Test Item    Value        Reference Range Interpretation Comments

 

             UA WBC (test code = UA WBC)  /HPF                            



[facility] [employer] [id number] 11:57:00





             Test Item    Value        Reference Range Interpretation Comments

 

             Antibody Scrn (test Negative (18 5:57                         

  



             code = Antibody Scrn) AM)                                    



[facility] [employer] [id number] 11:57:00





             Test Item    Value        Reference Range Interpretation Comments

 

             ABO/Rh (test code = ABO/Rh) AB POS                                 



[facility] [id number] 11:57:00





             Test Item    Value        Reference Range Interpretation Comments

 

             PTT (test code = PTT) 33.4 s       22.9-35.8                 



[facility] [id number] 11:57:00





             Test Item    Value        Reference Range Interpretation Comments

 

             PT (test code = PT) 13.7 s       12.0-14.7                 



[facility] [id number] 11:57:00





             Test Item    Value        Reference Range Interpretation Comments

 

             INR (test code = INR) 1.05 1       0.85-1.17                 



[facility] [employer] [id number] 11:57:00





             Test Item    Value        Reference Range Interpretation Comments

 

             Antibody Scrn (test Negative (18 5:57                         

  



             code = Antibody Scrn) AM)                                    



[employer] [id number] 11:57:00





             Test Item    Value        Reference Range Interpretation Comments

 

             ABO/Rh (test code = ABO/Rh) AB POS                                 



[id number] 11:57:00





             Test Item    Value        Reference Range Interpretation Comments

 

             PTT (test code = PTT) 33.4 s       22.9-35.8                 



[place] [id number] 11:57:00





             Test Item    Value        Reference Range Interpretation Comments

 

             PT (test code = PT) 13.7 s       12.0-14.7                 



[place] [address]-11-08 11:57:00





             Test Item    Value        Reference Range Interpretation Comments

 

             INR (test code = INR) 1.05 1       0.85-1.17                 



[facility]2018-11-08 10:50:01





             Test Item    Value        Reference Range Interpretation Comments

 

             Potassium Lvl (test code = Potassium 4.2          3.5-5.1          

         



             Lvl)                                                



[facility]2018-11-08 10:50:01





             Test Item    Value        Reference Range Interpretation Comments

 

             Sodium Lvl (test code = Sodium Lvl) 137          135-145           

        



[facility]2018-11-08 10:50:01





             Test Item    Value        Reference Range Interpretation Comments

 

             Creatinine Lvl (test code = Creatinine 0.85         0.50-1.40      

           



             Lvl)                                                



[facility]2018-11-08 10:50:01





             Test Item    Value        Reference Range Interpretation Comments

 

             AGAP (test code = AGAP) 9.2          10.0-20.0                 



[place] 034048-11-08 10:50:01





             Test Item    Value        Reference Range Interpretation Comments

 

             ACT (TEG) Rapid (test code = ACT (TEG) 136 s                 

           



             Rapid)                                              



[facility] [id number] 10:50:01





             Test Item    Value        Reference Range Interpretation Comments

 

             Split Point Rapid (test code = Split 0.6 min                       

         



             Point Rapid)                                        



[facility] [id number] 10:50:01





             Test Item    Value        Reference Range Interpretation Comments

 

             R-time Rapid (test code = R-time 0.9 min      0.4-0.7              

     



             Rapid)                                              



[facility] [id number] 10:50:01





             Test Item    Value        Reference Range Interpretation Comments

 

             K-time Rapid (test code = K-time 1.4 min      0.6-2.3              

     



             Rapid)                                              



[facility] [id number] 10:50:01





             Test Item    Value        Reference Range Interpretation Comments

 

             Angle Rapid (test code = Angle 71 degrees   64-80                  

   



             Rapid)                                              



[facility] [id number] 10:50:01





             Test Item    Value        Reference Range Interpretation Comments

 

             G-value Rapid (test code = G-value 12.7         5.0-11.6           

       



             Rapid)                                              



[facility] [id number] 10:50:01





             Test Item    Value        Reference Range Interpretation Comments

 

             Max Amplitude Rapid (test code = Max 72 mm        52-71            

         



             Amplitude Rapid)                                        



[facility] [id number] 10:50:01





             Test Item    Value        Reference Range Interpretation Comments

 

             Estimated % Lysis Rapid 0.1          See_Comment                [Au

tomated message] The



             (test code = Estimated                                        syste

m which generated



             % Lysis Rapid)                                        this result t

ransmitted



                                                                 reference range

: <=7.5.



                                                                 The reference r

zhen was



                                                                 not used to int

erpret



                                                                 this result as



                                                                 normal/abnormal

.



[facility] [id number] 10:50:01





             Test Item    Value        Reference Range Interpretation Comments

 

             Platelet (test code = Platelet) 367          133-450               

    



[facility] [id number] 10:50:01





             Test Item    Value        Reference Range Interpretation Comments

 

             MPV (test code = MPV) 7.8          7.4-10.4                  



[facility] Cent[id number] 10:50:01





             Test Item    Value        Reference Range Interpretation Comments

 

             MCH (test code = MCH) 27.4 pg      27.0-31.0                 



[facility] Cent[id number] 10:50:01





             Test Item    Value        Reference Range Interpretation Comments

 

             MCV (test code = MCV) 80.7         80.0-94.0                 



[facility] [id number] 10:50:01





             Test Item    Value        Reference Range Interpretation Comments

 

             MCHC (test code = MCHC) 34.0         32.0-36.0                 



[facility] Cent[id number] 10:50:01





             Test Item    Value        Reference Range Interpretation Comments

 

             RDW (test code = RDW) 18.9         11.5-14.5                 



[facility] CenterFcizbsgJVNYWZXNML1306-23-70 10:50:01





             Test Item    Value        Reference Range Interpretation Comments

 

             Hct (test code = Hct) 43.3         42.0-54.0                 



[facility] Cent[id number] 10:50:01





             Test Item    Value        Reference Range Interpretation Comments

 

             WBC (test code = WBC) 9.3          3.7-10.4                  



[facility] CenterTjtpdvdSOGZZIRZMR7211-40-96 10:50:01





             Test Item    Value        Reference Range Interpretation Comments

 

             Hgb (test code = Hgb) 14.7         14.0-18.0                 



[facility] [id number] 10:50:01





             Test Item    Value        Reference Range Interpretation Comments

 

             RBC (test code = RBC) 5.36         4.70-6.10                 



[facility] [id number] 10:50:01





             Test Item    Value        Reference Range Interpretation Comments

 

             Eosinophils # (test code 0.2          See_Comment                [A

utomated message] The



             = Eosinophils #)                                        system whic

h generated



                                                                 this result tra

nsmitted



                                                                 reference range

: <=0.5.



                                                                 The reference r

zhen was



                                                                 not used to int

erpret



                                                                 this result as



                                                                 normal/abnormal

.



[facility] C[id number] 10:50:01





             Test Item    Value        Reference Range Interpretation Comments

 

             Basophils # (test code 0.1          See_Comment                [Aut

omated message] The



             = Basophils #)                                        system which 

generated



                                                                 this result tra

nsmitted



                                                                 reference range

: <=0.2.



                                                                 The reference r

zhen was



                                                                 not used to int

erpret



                                                                 this result as



                                                                 normal/abnormal

.



[facility] [id number] 10:50:01





             Test Item    Value        Reference Range Interpretation Comments

 

             Lymphocytes # (test code = Lymphocytes 1.8          1.0-5.5        

           



             #)                                                  



[facility] Cent[id number] 10:50:01





             Test Item    Value        Reference Range Interpretation Comments

 

             Monocytes # (test code 0.9          See_Comment                [Aut

omated message] The



             = Monocytes #)                                        system which 

generated



                                                                 this result tra

nsmitted



                                                                 reference range

: <=0.8.



                                                                 The reference r

zhen was



                                                                 not used to int

erpret



                                                                 this result as



                                                                 normal/abnormal

.



[facility] CenterG[id number] 10:50:01





             Test Item    Value        Reference Range Interpretation Comments

 

             Neutrophils # (test code = Neutrophils 6.3          1.5-8.1        

           



             #)                                                  



[facility] CenterYvafpnhLNNMNVYAIC2647-32-40 10:50:01





             Test Item    Value        Reference Range Interpretation Comments

 

             Eosinophils (test code = 2.0          See_Comment                [A

utomated message] The



             Eosinophils)                                        system which ge

nerated



                                                                 this result tra

nsmitted



                                                                 reference range

: <=4.0.



                                                                 The reference r

zhen was



                                                                 not used to int

erpret



                                                                 this result as



                                                                 normal/abnormal

.



[facility] Cent[id number] 10:50:01





             Test Item    Value        Reference Range Interpretation Comments

 

             Segs (test code = Segs) 67.4         45.0-75.0                 



[facility] CenterJriwoihTXXJTRMFAN2527-22-57 10:50:01





             Test Item    Value        Reference Range Interpretation Comments

 

             Lymphocytes (test code = Lymphocytes) 19.9         20.0-40.0       

          



[facility] CenterUnbqrnmOTNUUPPIBK6622-62-25 10:50:01





             Test Item    Value        Reference Range Interpretation Comments

 

             Basophils (test code = 1.0          See_Comment                [Aut

omated message] The



             Basophils)                                          system which ge

nerated



                                                                 this result tra

nsmitted



                                                                 reference range

: <=1.0.



                                                                 The reference r

zhen was



                                                                 not used to int

erpret



                                                                 this result as



                                                                 normal/abnormal

.



[facility] Cent[id number] 10:50:01





             Test Item    Value        Reference Range Interpretation Comments

 

             Monocytes (test code = Monocytes) 9.7          2.0-12.0            

      



[facility]2018-11-08 10:50:01





             Test Item    Value        Reference Range Interpretation Comments

 

             eGFR (test code = eGFR) 133                                    



[facility]2018-11-08 10:50:01





             Test Item    Value        Reference Range Interpretation Comments

 

             Calcium Lvl (test code = Calcium Lvl) 9.4          8.5-10.5        

          



[facility]2018-11-08 10:50:01





             Test Item    Value        Reference Range Interpretation Comments

 

             CO2 (test code = CO2) 28           24-32                     



[facility]2018-11-08 10:50:01





             Test Item    Value        Reference Range Interpretation Comments

 

             BUN (test code = BUN) 14           7-22                      



[facility]2018-11-08 10:50:01





             Test Item    Value        Reference Range Interpretation Comments

 

             Glucose Lvl (test code = Glucose Lvl) 89           70-99           

          



[facility]2018-11-08 10:50:01





             Test Item    Value        Reference Range Interpretation Comments

 

             Chloride Lvl (test code = Chloride Lvl) 104                  

            



[facility]2018-11-08 10:50:01





             Test Item    Value        Reference Range Interpretation Comments

 

             Potassium Lvl (test code = Potassium 4.2          3.5-5.1          

         



             Lvl)                                                



[facility]2018-11-08 10:50:01





             Test Item    Value        Reference Range Interpretation Comments

 

             Sodium Lvl (test code = Sodium Lvl) 137          135-145           

        



[facility]2018-11-08 10:50:01





             Test Item    Value        Reference Range Interpretation Comments

 

             Creatinine Lvl (test code = Creatinine 0.85         0.50-1.40      

           



             Lvl)                                                



[facility]2018-11-08 10:50:01





             Test Item    Value        Reference Range Interpretation Comments

 

             AGAP (test code = AGAP) 9.2          10.0-20.0                 



[facility] CenterHssqubvDCJZCGQGJM3607-30-97 10:50:01





             Test Item    Value        Reference Range Interpretation Comments

 

             ACT (TEG) Rapid (test code = ACT (TEG) 136 s                 

           



             Rapid)                                              



[facility] Cent[id number] 10:50:01





             Test Item    Value        Reference Range Interpretation Comments

 

             Split Point Rapid (test code = Split 0.6 min                       

         



             Point Rapid)                                        



[facility] CenterQriplkcHDAIQZGIUB5197-08-60 10:50:01





             Test Item    Value        Reference Range Interpretation Comments

 

             R-time Rapid (test code = R-time 0.9 min      0.4-0.7              

     



             Rapid)                                              



[facility] Cent[id number] 10:50:01





             Test Item    Value        Reference Range Interpretation Comments

 

             K-time Rapid (test code = K-time 1.4 min      0.6-2.3              

     



             Rapid)                                              



[facility] Cent[id number] 10:50:01





             Test Item    Value        Reference Range Interpretation Comments

 

             Angle Rapid (test code = Angle 71 degrees   64-80                  

   



             Rapid)                                              



[facility] CenterIfccpdbEXIVKJDLIQ2155-22-97 10:50:01





             Test Item    Value        Reference Range Interpretation Comments

 

             G-value Rapid (test code = G-value 12.7         5.0-11.6           

       



             Rapid)                                              



[facility] [id number] 10:50:01





             Test Item    Value        Reference Range Interpretation Comments

 

             Max Amplitude Rapid (test code = Max 72 mm        52-71            

         



             Amplitude Rapid)                                        



[facility] CenterDnynvmbUTKVYKZECW6367-88-21 10:50:01





             Test Item    Value        Reference Range Interpretation Comments

 

             Estimated % Lysis Rapid 0.1          See_Comment                [Au

tomated message] The



             (test code = Estimated                                        syste

m which generated



             % Lysis Rapid)                                        this result t

ransmitted



                                                                 reference range

: <=7.5.



                                                                 The reference r

zhen was



                                                                 not used to int

erpret



                                                                 this result as



                                                                 normal/abnormal

.



[facility] Cent[id number] 10:50:01





             Test Item    Value        Reference Range Interpretation Comments

 

             Platelet (test code = Platelet) 367          133-450               

    



[facility] CenterIfloiilZQIDTBAIQV7835-32-48 10:50:01





             Test Item    Value        Reference Range Interpretation Comments

 

             MPV (test code = MPV) 7.8          7.4-10.4                  



[facility] CenterHbckwvdVDPUNXRXCI4338-87-07 10:50:01





             Test Item    Value        Reference Range Interpretation Comments

 

             MCH (test code = MCH) 27.4 pg      27.0-31.0                 



[facility] CenterGgyvqevUYUTWXNLYO5769-72-50 10:50:01





             Test Item    Value        Reference Range Interpretation Comments

 

             MCV (test code = MCV) 80.7         80.0-94.0                 



[facility] CenterSmrywllZDQEVHRESE7094-71-43 10:50:01





             Test Item    Value        Reference Range Interpretation Comments

 

             MCHC (test code = MCHC) 34.0         32.0-36.0                 



[facility] CenterHxyzapuXBBBHSYSGI2775-69-24 10:50:01





             Test Item    Value        Reference Range Interpretation Comments

 

             RDW (test code = RDW) 18.9         11.5-14.5                 



[facility] CenterWlpomfeWGYNHIDRFB4539-15-04 10:50:01





             Test Item    Value        Reference Range Interpretation Comments

 

             Hct (test code = Hct) 43.3         42.0-54.0                 



[facility] CenterAnqvynmSCAGOBKREF7410-49-49 10:50:01





             Test Item    Value        Reference Range Interpretation Comments

 

             WBC (test code = WBC) 9.3          3.7-10.4                  



[facility] CenterRuxwaciTRXQZQGJHR5264-22-46 10:50:01





             Test Item    Value        Reference Range Interpretation Comments

 

             Hgb (test code = Hgb) 14.7         14.0-18.0                 



[facility] CenterQdtivelYESZEXXNLF6856-07-39 10:50:01





             Test Item    Value        Reference Range Interpretation Comments

 

             RBC (test code = RBC) 5.36         4.70-6.10                 



[facility] CenterZpbkayqGHKRUWPHPJ8968-33-12 10:50:01





             Test Item    Value        Reference Range Interpretation Comments

 

             Eosinophils # (test code 0.2          See_Comment                [A

utomated message] The



             = Eosinophils #)                                        system whic

h generated



                                                                 this result tra

nsmitted



                                                                 reference range

: <=0.5.



                                                                 The reference r

zhen was



                                                                 not used to int

erpret



                                                                 this result as



                                                                 normal/abnormal

.



[facility] Cent[id number] 10:50:01





             Test Item    Value        Reference Range Interpretation Comments

 

             Basophils # (test code 0.1          See_Comment                [Aut

omated message] The



             = Basophils #)                                        system which 

generated



                                                                 this result tra

nsmitted



                                                                 reference range

: <=0.2.



                                                                 The reference r

zhen was



                                                                 not used to int

erpret



                                                                 this result as



                                                                 normal/abnormal

.



[facility] CenterKgkasstCORNVDTFLN1422-43-42 10:50:01





             Test Item    Value        Reference Range Interpretation Comments

 

             Lymphocytes # (test code = Lymphocytes 1.8          1.0-5.5        

           



             #)                                                  



[facility] CenterUwzwijqACLDZXOFTR0445-21-73 10:50:01





             Test Item    Value        Reference Range Interpretation Comments

 

             Monocytes # (test code 0.9          See_Comment                [Aut

omated message] The



             = Monocytes #)                                        system which 

generated



                                                                 this result tra

nsmitted



                                                                 reference range

: <=0.8.



                                                                 The reference r

zhen was



                                                                 not used to int

erpret



                                                                 this result as



                                                                 normal/abnormal

.



[facility] CenterBlvsfhpGDWOAGFQOF6576-23-01 10:50:01





             Test Item    Value        Reference Range Interpretation Comments

 

             Neutrophils # (test code = Neutrophils 6.3          1.5-8.1        

           



             #)                                                  



[facility] CenterEwasvwoQRSZIMFTSE1300-54-97 10:50:01





             Test Item    Value        Reference Range Interpretation Comments

 

             Eosinophils (test code = 2.0          See_Comment                [A

utomated message] The



             Eosinophils)                                        system which ge

nerated



                                                                 this result tra

nsmitted



                                                                 reference range

: <=4.0.



                                                                 The reference r

zhen was



                                                                 not used to int

erpret



                                                                 this result as



                                                                 normal/abnormal

.



[facility] Cent[id number] 10:50:01





             Test Item    Value        Reference Range Interpretation Comments

 

             Segs (test code = Segs) 67.4         45.0-75.0                 



[facility] CenterSgsxcxbPMJCLNZXMS2822-74-86 10:50:01





             Test Item    Value        Reference Range Interpretation Comments

 

             Lymphocytes (test code = Lymphocytes) 19.9         20.0-40.0       

          



[facility] CenterEpxwihlZEAGGYNMBC7089-04-18 10:50:01





             Test Item    Value        Reference Range Interpretation Comments

 

             Basophils (test code = 1.0          See_Comment                [Aut

omated message] The



             Basophils)                                          system which ge

nerated



                                                                 this result tra

nsmitted



                                                                 reference range

: <=1.0.



                                                                 The reference r

zhen was



                                                                 not used to int

erpret



                                                                 this result as



                                                                 normal/abnormal

.



[facility] CenterDexepgzTAJLGOHDDZ0479-90-96 10:50:01





             Test Item    Value        Reference Range Interpretation Comments

 

             Monocytes (test code = Monocytes) 9.7          2.0-12.0            

      



[facility]2018-11-08 10:50:01





             Test Item    Value        Reference Range Interpretation Comments

 

             eGFR (test code = eGFR) 133                                    



[facility]2018-11-08 10:50:01





             Test Item    Value        Reference Range Interpretation Comments

 

             Calcium Lvl (test code = Calcium Lvl) 9.4          8.5-10.5        

          



[facility]2018-11-08 10:50:01





             Test Item    Value        Reference Range Interpretation Comments

 

             CO2 (test code = CO2) 28           24-32                     



[facility]2018-11-08 10:50:01





             Test Item    Value        Reference Range Interpretation Comments

 

             BUN (test code = BUN) 14           7-22                      



[facility]2018-11-08 10:50:01





             Test Item    Value        Reference Range Interpretation Comments

 

             Glucose Lvl (test code = Glucose Lvl) 89           70-99           

          



[facility]2018-11-08 10:50:01





             Test Item    Value        Reference Range Interpretation Comments

 

             Chloride Lvl (test code = Chloride Lvl) 104                  

            



[facility]2018-05-08 05:42:00





             Test Item    Value        Reference Range Interpretation Comments

 

             B/C Ratio (test code = B/C Ratio) 17 1         6-25                

      



[facility]2018-05-08 05:42:00





             Test Item    Value        Reference Range Interpretation Comments

 

             Globulin (test code = Globulin) 4.3          2.7-4.2               

    



[facility]2018-05-08 05:42:00





             Test Item    Value        Reference Range Interpretation Comments

 

             A/G Ratio (test code = A/G Ratio) 0.7 1        0.7-1.6             

      



[facility]2018-05-08 05:42:00





             Test Item    Value        Reference Range Interpretation Comments

 

             AGAP (test code = AGAP) 14.4         10.0-20.0                 



[facility]2018-05-08 05:42:00





             Test Item    Value        Reference Range Interpretation Comments

 

             eGFR (test code = eGFR) 113                                    



[facility]2018-05-08 05:42:00





             Test Item    Value        Reference Range Interpretation Comments

 

             Alk Phos (test code = Alk Phos) 76                           

    



[facility]2018-05-08 05:42:00





             Test Item    Value        Reference Range Interpretation Comments

 

             ALT (test code = ALT) 35           See_Comment                [Auto

mated message] The



                                                                 system which ge

nerated this



                                                                 result transmit

huma



                                                                 reference range

: <=65. The



                                                                 reference range

 was not



                                                                 used to interpr

et this



                                                                 result as zayda

l/abnormal.



[facility]2018-05-08 05:42:00





             Test Item    Value        Reference Range Interpretation Comments

 

             Albumin Lvl (test code = Albumin Lvl) 2.8          3.5-5.0         

          



[facility]2018-05-08 05:42:00





             Test Item    Value        Reference Range Interpretation Comments

 

             Total Protein (test code = Total 7.1          6.4-8.4              

     



             Protein)                                            



[facility]2018-05-08 05:42:00





             Test Item    Value        Reference Range Interpretation Comments

 

             Calcium Lvl (test code = Calcium Lvl) 8.7          8.5-10.5        

          



[facility]2018-05-08 05:42:00





             Test Item    Value        Reference Range Interpretation Comments

 

             AST (test code = AST) 18           See_Comment                [Auto

mated message] The



                                                                 system which ge

nerated this



                                                                 result transmit

huma



                                                                 reference range

: <=37. The



                                                                 reference range

 was not



                                                                 used to interpr

et this



                                                                 result as zayda

l/abnormal.



[facility]2018-05-08 05:42:00





             Test Item    Value        Reference Range Interpretation Comments

 

             Bili Total (test code = Bili Total) 0.3          0.2-1.3           

        



[place] [id number] 05:42:00





             Test Item    Value        Reference Range Interpretation Comments

 

             Potassium Lvl (test code = Potassium 4.4          3.5-5.1          

         



             Lvl)                                                



[facility]2018-05-08 05:42:00





             Test Item    Value        Reference Range Interpretation Comments

 

             Chloride Lvl (test code = Chloride Lvl) 109                  

            



[facility]2018-05-08 05:42:00





             Test Item    Value        Reference Range Interpretation Comments

 

             CO2 (test code = CO2) 23           24-32                     



[facility]2018-05-08 05:42:00





             Test Item    Value        Reference Range Interpretation Comments

 

             Glucose Lvl (test code = Glucose Lvl) 114          70-99           

          



[facility]2018-05-08 05:42:00





             Test Item    Value        Reference Range Interpretation Comments

 

             Creatinine Lvl (test code = Creatinine 1.01         0.50-1.40      

           



             Lvl)                                                



[facility]2018-05-08 05:42:00





             Test Item    Value        Reference Range Interpretation Comments

 

             BUN (test code = BUN) 17           7-22                      



[facility]2018-05-08 05:42:00





             Test Item    Value        Reference Range Interpretation Comments

 

             Sodium Lvl (test code = Sodium Lvl) 142          135-145           

        



[facility] CenterWkyynylGGWVGMHCLX6872-56-64 05:42:00





             Test Item    Value        Reference Range Interpretation Comments

 

             Basophils (test code = 0.6          See_Comment                [Aut

omated message] The



             Basophils)                                          system which ge

nerated



                                                                 this result tra

nsmitted



                                                                 reference range

: <=1.0.



                                                                 The reference r

zhen was



                                                                 not used to int

erpret



                                                                 this result as



                                                                 normal/abnormal

.



[facility] CenterLnrydajURYLFZGMUL6368-66-33 05:42:00





             Test Item    Value        Reference Range Interpretation Comments

 

             Segs-Bands # (test code = Segs-Bands #) 4.8          1.5-8.1       

            



[facility] CenterCkpiskuXAGGMQLMTU6990-24-08 05:42:00





             Test Item    Value        Reference Range Interpretation Comments

 

             Monocytes # (test code 0.7          See_Comment                [Aut

omated message] The



             = Monocytes #)                                        system which 

generated



                                                                 this result tra

nsmitted



                                                                 reference range

: <=0.8.



                                                                 The reference r

zhen was



                                                                 not used to int

erpret



                                                                 this result as



                                                                 normal/abnormal

.



[facility] CenterOkmcaxqWYAPLIOSPM7137-04-01 05:42:00





             Test Item    Value        Reference Range Interpretation Comments

 

             Lymphocytes # (test code = Lymphocytes 1.9          1.0-5.5        

           



             #)                                                  



[facility] CenterKqsyjhpYLPEMCQVJK3323-73-00 05:42:00





             Test Item    Value        Reference Range Interpretation Comments

 

             Monocytes (test code = Monocytes) 9.4          2.0-12.0            

      



[facility] CenterVluoyarOXUUSFSWPU9617-30-52 05:42:00





             Test Item    Value        Reference Range Interpretation Comments

 

             Eosinophils # (test code 0.2          See_Comment                [A

utomated message] The



             = Eosinophils #)                                        system whic

h generated



                                                                 this result tra

nsmitted



                                                                 reference range

: <=0.5.



                                                                 The reference r

zhen was



                                                                 not used to int

erpret



                                                                 this result as



                                                                 normal/abnormal

.



[facility] CenterQdvlcqbDRFQKUPBLL0134-93-02 05:42:00





             Test Item    Value        Reference Range Interpretation Comments

 

             Eosinophils (test code = 2.9          See_Comment                [A

utomated message] The



             Eosinophils)                                        system which ge

nerated



                                                                 this result tra

nsmitted



                                                                 reference range

: <=4.0.



                                                                 The reference r

zhen was



                                                                 not used to int

erpret



                                                                 this result as



                                                                 normal/abnormal

.



[facility] CenterZqcrjvyOILKQNILDK6971-21-88 05:42:00





             Test Item    Value        Reference Range Interpretation Comments

 

             Segs (test code = Segs) 62.2         45.0-75.0                 



[facility]yizdwwWFHFZJUXEA7780[facility]





             Test Item    Value        Reference Range Interpretation Comments

 

             Lymphocytes (test code = Lymphocytes) 24.9         20.0-40.0       

          



[facility] CenterAbgtploFQEOCAAVMU4058-45-44 05:42:00





             Test Item    Value        Reference Range Interpretation Comments

 

             MCH (test code = MCH) 27.5 pg      27.0-31.0                 



[facility]8389[facility]





             Test Item    Value        Reference Range Interpretation Comments

 

             MCV (test code = MCV) 85.3         80.0-94.0                 



[facility] CenterXjzfnruBFTBPTVEQL1185-17-99 05:42:00





             Test Item    Value        Reference Range Interpretation Comments

 

             Hct (test code = Hct) 43.9         42.0-54.0                 



[facility] CenterEtnubjbMXMEOVUMOY9946-78-87 05:42:00





             Test Item    Value        Reference Range Interpretation Comments

 

             Hgb (test code = Hgb) 14.2         14.0-18.0                 



[facility]WjtzxojWWUNANUFPD3552-60-46 05:42:00





             Test Item    Value        Reference Range Interpretation Comments

 

             WBC (test code = WBC) 7.7          3.7-10.4                  



[facility] CenterNqbhibfJSIGZZGFJM8634-49-94 05:42:00





             Test Item    Value        Reference Range Interpretation Comments

 

             RBC (test code = RBC) 5.15         4.70-6.10                 



[facility] CenterPuvkidwWWIHYJJLEL0832-69-98 05:42:00





             Test Item    Value        Reference Range Interpretation Comments

 

             MPV (test code = MPV) 8.4          7.4-10.4                  



[facility] HospitalZxyprfeVNVBADILFM3025-30-58 05:42:00





             Test Item    Value        Reference Range Interpretation Comments

 

             MCHC (test code = MCHC) 32.3         32.0-36.0                 



[facility] CenterKbjgdqgMCXKRLSNAL6379-89-76 05:42:00





             Test Item    Value        Reference Range Interpretation Comments

 

             RDW (test code = RDW) 17.3         11.5-14.5                 



[facility] CenterPaqkzslMMSZXLEEAB9876-06-12 05:42:00





             Test Item    Value        Reference Range Interpretation Comments

 

             Platelet (test code = Platelet) 317          133-450               

    



[facility]2018-05-08 05:42:00





             Test Item    Value        Reference Range Interpretation Comments

 

             B/C Ratio (test code = B/C Ratio) 17 1         6-25                

      



[facility]2018-05-08 05:42:00





             Test Item    Value        Reference Range Interpretation Comments

 

             Globulin (test code = Globulin) 4.3          2.7-4.2               

    



[facility]2018-05-08 05:42:00





             Test Item    Value        Reference Range Interpretation Comments

 

             A/G Ratio (test code = A/G Ratio) 0.7 1        0.7-1.6             

      



[facility]2018-05-08 05:42:00





             Test Item    Value        Reference Range Interpretation Comments

 

             AGAP (test code = AGAP) 14.4         10.0-20.0                 



[facility]2018-05-08 05:42:00





             Test Item    Value        Reference Range Interpretation Comments

 

             eGFR (test code = eGFR) 113                                    



[facility]2018-05-08 05:42:00





             Test Item    Value        Reference Range Interpretation Comments

 

             Alk Phos (test code = Alk Phos) 76                           

    



[facility]2018-05-08 05:42:00





             Test Item    Value        Reference Range Interpretation Comments

 

             ALT (test code = ALT) 35           See_Comment                [Auto

mated message] The



                                                                 system which ge

nerated this



                                                                 result transmit

huma



                                                                 reference range

: <=65. The



                                                                 reference range

 was not



                                                                 used to interpr

et this



                                                                 result as zayda

l/abnormal.



[facility]2018-05-08 05:42:00





             Test Item    Value        Reference Range Interpretation Comments

 

             Albumin Lvl (test code = Albumin Lvl) 2.8          3.5-5.0         

          



[facility]2018-05-08 05:42:00





             Test Item    Value        Reference Range Interpretation Comments

 

             Total Protein (test code = Total 7.1          6.4-8.4              

     



             Protein)                                            



[facility]2018-05-08 05:42:00





             Test Item    Value        Reference Range Interpretation Comments

 

             Calcium Lvl (test code = Calcium Lvl) 8.7          8.5-10.5        

          



[facility]2018-05-08 05:42:00





             Test Item    Value        Reference Range Interpretation Comments

 

             AST (test code = AST) 18           See_Comment                [Auto

mated message] The



                                                                 system which ge

nerated this



                                                                 result transmit

huma



                                                                 reference range

: <=37. The



                                                                 reference range

 was not



                                                                 used to interpr

et this



                                                                 result as zayda

l/abnormal.



[place] [id number] 05:42:00





             Test Item    Value        Reference Range Interpretation Comments

 

             Bili Total (test code = Bili Total) 0.3          0.2-1.3           

        



[facility]2018-05-08 05:42:00





             Test Item    Value        Reference Range Interpretation Comments

 

             Potassium Lvl (test code = Potassium 4.4          3.5-5.1          

         



             Lvl)                                                



[facility]2018-05-08 05:42:00





             Test Item    Value        Reference Range Interpretation Comments

 

             Chloride Lvl (test code = Chloride Lvl) 109                  

            



[facility]2018-05-08 05:42:00





             Test Item    Value        Reference Range Interpretation Comments

 

             CO2 (test code = CO2) 23           24-32                     



[facility]2018-05-08 05:42:00





             Test Item    Value        Reference Range Interpretation Comments

 

             Glucose Lvl (test code = Glucose Lvl) 114          70-99           

          



[facility]2018-05-08 05:42:00





             Test Item    Value        Reference Range Interpretation Comments

 

             Creatinine Lvl (test code = Creatinine 1.01         0.50-1.40      

           



             Lvl)                                                



[facility]2018-05-08 05:42:00





             Test Item    Value        Reference Range Interpretation Comments

 

             BUN (test code = BUN) 17           7-22                      



[facility]2018-05-08 05:42:00





             Test Item    Value        Reference Range Interpretation Comments

 

             Sodium Lvl (test code = Sodium Lvl) 142          135-145           

        



[facility] CenterYnozkhlPZSUVTKUBY7945-91-62 05:42:00





             Test Item    Value        Reference Range Interpretation Comments

 

             Basophils (test code = 0.6          See_Comment                [Aut

omated message] The



             Basophils)                                          system which ge

nerated



                                                                 this result tra

nsmitted



                                                                 reference range

: <=1.0.



                                                                 The reference r

zhen was



                                                                 not used to int

erpret



                                                                 this result as



                                                                 normal/abnormal

.



[facility] CenterWzisuqqEBEJSOPXMB9211-63-34 05:42:00





             Test Item    Value        Reference Range Interpretation Comments

 

             Segs-Bands # (test code = Segs-Bands #) 4.8          1.5-8.1       

            



[facility] CenterEbqzkpmGWFIKCAFJP8477-46-69 05:42:00





             Test Item    Value        Reference Range Interpretation Comments

 

             Monocytes # (test code 0.7          See_Comment                [Aut

omated message] The



             = Monocytes #)                                        system which 

generated



                                                                 this result tra

nsmitted



                                                                 reference range

: <=0.8.



                                                                 The reference r

zhen was



                                                                 not used to int

erpret



                                                                 this result as



                                                                 normal/abnormal

.



[facility] CenterNoovlcpMHBNJKJMQP9490-86-93 05:42:00





             Test Item    Value        Reference Range Interpretation Comments

 

             Lymphocytes # (test code = Lymphocytes 1.9          1.0-5.5        

           



             #)                                                  



[facility] CenterRkxdzcbHFYSXMQAQI0689-13-84 05:42:00





             Test Item    Value        Reference Range Interpretation Comments

 

             Monocytes (test code = Monocytes) 9.4          2.0-12.0            

      



[facility] CenterRodemrsRUIBTJVAKM9645-09-60 05:42:00





             Test Item    Value        Reference Range Interpretation Comments

 

             Eosinophils # (test code 0.2          See_Comment                [A

utomated message] The



             = Eosinophils #)                                        system whic

h generated



                                                                 this result tra

nsmitted



                                                                 reference range

: <=0.5.



                                                                 The reference r

zhen was



                                                                 not used to int

erpret



                                                                 this result as



                                                                 normal/abnormal

.



[facility] CenterGjnxxblJLGXMZFCOC9205-19-25 05:42:00





             Test Item    Value        Reference Range Interpretation Comments

 

             Eosinophils (test code = 2.9          See_Comment                [A

utomated message] The



             Eosinophils)                                        system which ge

nerated



                                                                 this result tra

nsmitted



                                                                 reference range

: <=4.0.



                                                                 The reference r

zhen was



                                                                 not used to int

erpret



                                                                 this result as



                                                                 normal/abnormal

.



[facility] CenterJbrwbdjWLHQODEQOG3595-21-67 05:42:00





             Test Item    Value        Reference Range Interpretation Comments

 

             Segs (test code = Segs) 62.2         45.0-75.0                 



[facility] CenterLvxrbteYTDCSXIXTF4222-77-76 05:42:00





             Test Item    Value        Reference Range Interpretation Comments

 

             Lymphocytes (test code = Lymphocytes) 24.9         20.0-40.0       

          



[facility] CenterDmxpqknAGPDQLQJUI7520-79-67 05:42:00





             Test Item    Value        Reference Range Interpretation Comments

 

             MCH (test code = MCH) 27.5 pg      27.0-31.0                 



[facility] CenterRqkhkrcZOIYZDIVYP7553-52-68 05:42:00





             Test Item    Value        Reference Range Interpretation Comments

 

             MCV (test code = MCV) 85.3         80.0-94.0                 



[facility] CenterLeavklqOWMXMWQTKV2279-08-66 05:42:00





             Test Item    Value        Reference Range Interpretation Comments

 

             Hct (test code = Hct) 43.9         42.0-54.0                 



[facility] CenterPbzurqrJYFOKVWIWG1619-43-84 05:42:00





             Test Item    Value        Reference Range Interpretation Comments

 

             Hgb (test code = Hgb) 14.2         14.0-18.0                 



[place] [id number] 05:42:00





             Test Item    Value        Reference Range Interpretation Comments

 

             WBC (test code = WBC) 7.7          3.7-10.4                  



[facility] CenterFqzxugdDNXWJNFTID7348-24-77 05:42:00





             Test Item    Value        Reference Range Interpretation Comments

 

             RBC (test code = RBC) 5.15         4.70-6.10                 



[facility] CenterEsvegscTBOUXHAIHB4882-85-04 05:42:00





             Test Item    Value        Reference Range Interpretation Comments

 

             MPV (test code = MPV) 8.4          7.4-10.4                  



[place] 7[address]-05-08 05:42:00





             Test Item    Value        Reference Range Interpretation Comments

 

             MCHC (test code = MCHC) 32.3         32.0-36.0                 



[facility] CenterHsnbbaiLGNBYPIWVZ6268-82-55 05:42:00





             Test Item    Value        Reference Range Interpretation Comments

 

             RDW (test code = RDW) 17.3         11.5-14.5                 



[facility] CenterNuhubvkRHKTGCGSUW5052-54-19 05:42:00





             Test Item    Value        Reference Range Interpretation Comments

 

             Platelet (test code = Platelet) 317          133-450               

    



[facility]2018-05-07 09:36:00





             Test Item    Value        Reference Range Interpretation Comments

 

             Globulin (test code = Globulin) 4.4          2.7-4.2               

    



[facility]2018-05-07 09:36:00





             Test Item    Value        Reference Range Interpretation Comments

 

             A/G Ratio (test code = A/G Ratio) 0.6 1        0.7-1.6             

      



[facility]2018-05-07 09:36:00





             Test Item    Value        Reference Range Interpretation Comments

 

             B/C Ratio (test code = B/C Ratio) 17 1         6-25                

      



[facility]2018-05-07 09:36:00





             Test Item    Value        Reference Range Interpretation Comments

 

             AGAP (test code = AGAP) 11.3         10.0-20.0                 



[facility]2018-05-07 09:36:00





             Test Item    Value        Reference Range Interpretation Comments

 

             eGFR (test code = eGFR) 134                                    



[facility]2018-05-07 09:36:00





             Test Item    Value        Reference Range Interpretation Comments

 

             Creatinine Lvl (test code = Creatinine 0.84         0.50-1.40      

           



             Lvl)                                                



[facility]2018-05-07 09:36:00





             Test Item    Value        Reference Range Interpretation Comments

 

             Sodium Lvl (test code = Sodium Lvl) 142          135-145           

        



[facility]2018-05-07 09:36:00





             Test Item    Value        Reference Range Interpretation Comments

 

             Glucose Lvl (test code = Glucose Lvl) 99           70-99           

          



[facility]2018-05-07 09:36:00





             Test Item    Value        Reference Range Interpretation Comments

 

             BUN (test code = BUN) 14           7-22                      



[facility]2018-05-07 09:36:00





             Test Item    Value        Reference Range Interpretation Comments

 

             Alk Phos (test code = Alk Phos) 79                           

    



[place] [id number] 09:36:00





             Test Item    Value        Reference Range Interpretation Comments

 

             Bili Total (test code = Bili Total) 0.3          0.2-1.3           

        



[facility]2018-05-07 09:36:00





             Test Item    Value        Reference Range Interpretation Comments

 

             AST (test code = AST) 14           See_Comment                [Auto

mated message] The



                                                                 system which ge

nerated this



                                                                 result transmit

huma



                                                                 reference range

: <=37. The



                                                                 reference range

 was not



                                                                 used to interpr

et this



                                                                 result as zayda

l/abnormal.



[facility]2018-05-07 09:36:00





             Test Item    Value        Reference Range Interpretation Comments

 

             ALT (test code = ALT) 43           See_Comment                [Auto

mated message] The



                                                                 system which ge

nerated this



                                                                 result transmit

huma



                                                                 reference range

: <=65. The



                                                                 reference range

 was not



                                                                 used to interpr

et this



                                                                 result as zayda

l/abnormal.



[facility]2018-05-07 09:36:00





             Test Item    Value        Reference Range Interpretation Comments

 

             Total Protein (test code = Total 7.1          6.4-8.4              

     



             Protein)                                            



[facility]2018-05-07 09:36:00





             Test Item    Value        Reference Range Interpretation Comments

 

             Albumin Lvl (test code = Albumin Lvl) 2.7          3.5-5.0         

          



[facility]2018-05-07 09:36:00





             Test Item    Value        Reference Range Interpretation Comments

 

             Calcium Lvl (test code = Calcium Lvl) 9.2          8.5-10.5        

          



[facility]2018-05-07 09:36:00





             Test Item    Value        Reference Range Interpretation Comments

 

             CO2 (test code = CO2) 21           24-32                     



[place] [id number] 09:36:00





             Test Item    Value        Reference Range Interpretation Comments

 

             Potassium Lvl (test code = Potassium 4.3          3.5-5.1          

         



             Lvl)                                                



[facility]2018-05-07 09:36:00





             Test Item    Value        Reference Range Interpretation Comments

 

             Chloride Lvl (test code = Chloride Lvl) 114                  

            



[facility] CenterLdosypcHDHWAZHCLU4947-83-27 09:36:00





             Test Item    Value        Reference Range Interpretation Comments

 

             MCHC (test code = MCHC) 32.5         32.0-36.0                 



[facility] CenterPwnieooIUYKRCOZPO0945-92-23 09:36:00





             Test Item    Value        Reference Range Interpretation Comments

 

             RDW (test code = RDW) 17.5         11.5-14.5                 



[facility] CenterWmcsmnzMEYSQFFCUH5020-34-79 09:36:00





             Test Item    Value        Reference Range Interpretation Comments

 

             Platelet (test code = Platelet) 400          133-450               

    



[facility] CenterRclxfwfIWPZIZUKLU5487-18-03 09:36:00





             Test Item    Value        Reference Range Interpretation Comments

 

             MPV (test code = MPV) 8.5          7.4-10.4                  



[facility] CenterNesvrphKVGQXMANIK9479-41-18 09:36:00





             Test Item    Value        Reference Range Interpretation Comments

 

             WBC (test code = WBC) 6.6          3.7-10.4                  



[facility] CenterLmxfnpcSNNXNJQNPK1723-75-91 09:36:00





             Test Item    Value        Reference Range Interpretation Comments

 

             RBC (test code = RBC) 5.17         4.70-6.10                 



[facility] CenterDnjkkwqCEXPWAIMKD3531-26-30 09:36:00





             Test Item    Value        Reference Range Interpretation Comments

 

             MCV (test code = MCV) 86.1         80.0-94.0                 



[facility] CenterEibhmnhVGNZQVTEUL3626-23-67 09:36:00





             Test Item    Value        Reference Range Interpretation Comments

 

             Hct (test code = Hct) 44.5         42.0-54.0                 



[facility] CenterMrkpeqbRHYYAIULUM6234-98-22 09:36:00





             Test Item    Value        Reference Range Interpretation Comments

 

             MCH (test code = MCH) 28.0 pg      27.0-31.0                 



[facility] CenterVgsvggpCIHMPVMDUR9197-96-00 09:36:00





             Test Item    Value        Reference Range Interpretation Comments

 

             Hgb (test code = Hgb) 14.5         14.0-18.0                 



[facility] CenterApljxdnNTEERMYGQC0145-53-43 09:36:00





             Test Item    Value        Reference Range Interpretation Comments

 

             Lymphocytes # (test code = Lymphocytes 1.7          1.0-5.5        

           



             #)                                                  



[facility] CenterYguadupTMGCIIBOZM5575-95-54 09:36:00





             Test Item    Value        Reference Range Interpretation Comments

 

             Monocytes # (test code 0.7          See_Comment                [Aut

omated message] The



             = Monocytes #)                                        system which 

generated



                                                                 this result tra

nsmitted



                                                                 reference range

: <=0.8.



                                                                 The reference r

zhen was



                                                                 not used to int

erpret



                                                                 this result as



                                                                 normal/abnormal

.



[facility] CenterBwwtmvfVWRFXEPBZU1499-35-66 09:36:00





             Test Item    Value        Reference Range Interpretation Comments

 

             Eosinophils # (test code 0.2          See_Comment                [A

utomated message] The



             = Eosinophils #)                                        system whic

h generated



                                                                 this result tra

nsmitted



                                                                 reference range

: <=0.5.



                                                                 The reference r

zhen was



                                                                 not used to int

erpret



                                                                 this result as



                                                                 normal/abnormal

.



[facility] CenterVwoqjsuJNXEORJGIQ1310-56-58 09:36:00





             Test Item    Value        Reference Range Interpretation Comments

 

             Lymphocytes (test code = Lymphocytes) 26.1         20.0-40.0       

          



[facility] CenterSyvrgonANQXHAVCKQ8914-08-31 09:36:00





             Test Item    Value        Reference Range Interpretation Comments

 

             Segs (test code = Segs) 59.3         45.0-75.0                 



[facility] CenterZhxnnmcYQXTSRBWTY5304-85-96 09:36:00





             Test Item    Value        Reference Range Interpretation Comments

 

             Basophils (test code = 0.8          See_Comment                [Aut

omated message] The



             Basophils)                                          system which ge

nerated



                                                                 this result tra

nsmitted



                                                                 reference range

: <=1.0.



                                                                 The reference r

zhen was



                                                                 not used to int

erpret



                                                                 this result as



                                                                 normal/abnormal

.



[facility] CenterJddbcihCURNBRHDNG9733-28-07 09:36:00





             Test Item    Value        Reference Range Interpretation Comments

 

             Monocytes (test code = Monocytes) 10.5         2.0-12.0            

      



[facility] CenterBugqcynSZWKRRLALP0395-84-15 09:36:00





             Test Item    Value        Reference Range Interpretation Comments

 

             Eosinophils (test code = 3.3          See_Comment                [A

utomated message] The



             Eosinophils)                                        system which ge

nerated



                                                                 this result tra

nsmitted



                                                                 reference range

: <=4.0.



                                                                 The reference r

zhen was



                                                                 not used to int

erpret



                                                                 this result as



                                                                 normal/abnormal

.



[facility] CenterKopqpwsSQAUXJRPFU6190-61-52 09:36:00





             Test Item    Value        Reference Range Interpretation Comments

 

             Segs-Bands # (test code = Segs-Bands #) 3.9          1.5-8.1       

            



[facility]2018-05-07 09:36:00





             Test Item    Value        Reference Range Interpretation Comments

 

             Globulin (test code = Globulin) 4.4          2.7-4.2               

    



[facility]2018-05-07 09:36:00





             Test Item    Value        Reference Range Interpretation Comments

 

             A/G Ratio (test code = A/G Ratio) 0.6 1        0.7-1.6             

      



[place] 028598-05-07 09:36:00





             Test Item    Value        Reference Range Interpretation Comments

 

             B/C Ratio (test code = B/C Ratio) 17 1         6-25                

      



[place] 028598-05-07 09:36:00





             Test Item    Value        Reference Range Interpretation Comments

 

             AGAP (test code = AGAP) 11.3         10.0-20.0                 



[place] 028598-05-07 09:36:00





             Test Item    Value        Reference Range Interpretation Comments

 

             eGFR (test code = eGFR) 134                                    



[facility]2018-05-07 09:36:00





             Test Item    Value        Reference Range Interpretation Comments

 

             Creatinine Lvl (test code = Creatinine 0.84         0.50-1.40      

           



             Lvl)                                                



[facility]2018-05-07 09:36:00





             Test Item    Value        Reference Range Interpretation Comments

 

             Sodium Lvl (test code = Sodium Lvl) 142          135-145           

        



[facility]2018-05-07 09:36:00





             Test Item    Value        Reference Range Interpretation Comments

 

             Glucose Lvl (test code = Glucose Lvl) 99           70-99           

          



[facility]2018-05-07 09:36:00





             Test Item    Value        Reference Range Interpretation Comments

 

             BUN (test code = BUN) 14           7-22                      



[facility]2018-05-07 09:36:00





             Test Item    Value        Reference Range Interpretation Comments

 

             Alk Phos (test code = Alk Phos) 79                           

    



[facility]2018-05-07 09:36:00





             Test Item    Value        Reference Range Interpretation Comments

 

             Bili Total (test code = Bili Total) 0.3          0.2-1.3           

        



[place] 028598-05-07 09:36:00





             Test Item    Value        Reference Range Interpretation Comments

 

             AST (test code = AST) 14           See_Comment                [Auto

mated message] The



                                                                 system which ge

nerated this



                                                                 result transmit

huma



                                                                 reference range

: <=37. The



                                                                 reference range

 was not



                                                                 used to interpr

et this



                                                                 result as zayda

l/abnormal.



[facility]2018-05-07 09:36:00





             Test Item    Value        Reference Range Interpretation Comments

 

             ALT (test code = ALT) 43           See_Comment                [Auto

mated message] The



                                                                 system which ge

nerated this



                                                                 result transmit

huma



                                                                 reference range

: <=65. The



                                                                 reference range

 was not



                                                                 used to interpr

et this



                                                                 result as zayda

l/abnormal.



[facility]2018-05-07 09:36:00





             Test Item    Value        Reference Range Interpretation Comments

 

             Total Protein (test code = Total 7.1          6.4-8.4              

     



             Protein)                                            



[facility]2018-05-07 09:36:00





             Test Item    Value        Reference Range Interpretation Comments

 

             Albumin Lvl (test code = Albumin Lvl) 2.7          3.5-5.0         

          



[place] 028598-05-07 09:36:00





             Test Item    Value        Reference Range Interpretation Comments

 

             Calcium Lvl (test code = Calcium Lvl) 9.2          8.5-10.5        

          



[place] 028598-05-07 09:36:00





             Test Item    Value        Reference Range Interpretation Comments

 

             CO2 (test code = CO2) 21           24-32                     



[place] 028598-05-07 09:36:00





             Test Item    Value        Reference Range Interpretation Comments

 

             Potassium Lvl (test code = Potassium 4.3          3.5-5.1          

         



             Lvl)                                                



[place] 028598-05-07 09:36:00





             Test Item    Value        Reference Range Interpretation Comments

 

             Chloride Lvl (test code = Chloride Lvl) 114                  

            



[facility] CenterDxkvptaRDBMSERUHN3503-65-47 09:36:00





             Test Item    Value        Reference Range Interpretation Comments

 

             MCHC (test code = MCHC) 32.5         32.0-36.0                 



[facility] CenterCpddagfYVAZUAZYZX9930-52-45 09:36:00





             Test Item    Value        Reference Range Interpretation Comments

 

             RDW (test code = RDW) 17.5         11.5-14.5                 



[facility] CenterSendvetGBGMPDECTV8994-60-41 09:36:00





             Test Item    Value        Reference Range Interpretation Comments

 

             Platelet (test code = Platelet) 400          133-450               

    



[facility] CenterCvdbmgnFRITRUFWUW0753-60-31 09:36:00





             Test Item    Value        Reference Range Interpretation Comments

 

             MPV (test code = MPV) 8.5          7.4-10.4                  



[facility] CenterUdmksfdUBZAJXBSHN0046-78-89 09:36:00





             Test Item    Value        Reference Range Interpretation Comments

 

             WBC (test code = WBC) 6.6          3.7-10.4                  



[facility] CenterAuvpieiKHGCDBJZBE1953-59-20 09:36:00





             Test Item    Value        Reference Range Interpretation Comments

 

             RBC (test code = RBC) 5.17         4.70-6.10                 



[facility] CenterIcbepbtEIZNPLUAYL1518-04-01 09:36:00





             Test Item    Value        Reference Range Interpretation Comments

 

             MCV (test code = MCV) 86.1         80.0-94.0                 



[facility] CenterZyzmeagTWPBXNFXVX2798-13-05 09:36:00





             Test Item    Value        Reference Range Interpretation Comments

 

             Hct (test code = Hct) 44.5         42.0-54.0                 



[facility] CenterGtnbdqpASRXMBISLA3976-64-07 09:36:00





             Test Item    Value        Reference Range Interpretation Comments

 

             MCH (test code = MCH) 28.0 pg      27.0-31.0                 



[facility] CenterInsnxkhJUUDSCGJUH6298-13-54 09:36:00





             Test Item    Value        Reference Range Interpretation Comments

 

             Hgb (test code = Hgb) 14.5         14.0-18.0                 



[facility] CenterZtvlwwyYXPGXCDDHU8310-94-25 09:36:00





             Test Item    Value        Reference Range Interpretation Comments

 

             Lymphocytes # (test code = Lymphocytes 1.7          1.0-5.5        

           



             #)                                                  



[facility] CenterVsdoivaBZAREFHFMW2943-79-51 09:36:00





             Test Item    Value        Reference Range Interpretation Comments

 

             Monocytes # (test code 0.7          See_Comment                [Aut

omated message] The



             = Monocytes #)                                        system which 

generated



                                                                 this result tra

nsmitted



                                                                 reference range

: <=0.8.



                                                                 The reference r

zhen was



                                                                 not used to int

erpret



                                                                 this result as



                                                                 normal/abnormal

.



[facility] CenterKuiysgaLYLEJXQFHB3769-74-65 09:36:00





             Test Item    Value        Reference Range Interpretation Comments

 

             Eosinophils # (test code 0.2          See_Comment                [A

utomated message] The



             = Eosinophils #)                                        system whic

h generated



                                                                 this result tra

nsmitted



                                                                 reference range

: <=0.5.



                                                                 The reference r

zhen was



                                                                 not used to int

erpret



                                                                 this result as



                                                                 normal/abnormal

.



[facility] CenterUoyarwfTKCBSBYBEW8580-95-97 09:36:00





             Test Item    Value        Reference Range Interpretation Comments

 

             Lymphocytes (test code = Lymphocytes) 26.1         20.0-40.0       

          



[place] 034048-05-07 09:36:00





             Test Item    Value        Reference Range Interpretation Comments

 

             Segs (test code = Segs) 59.3         45.0-75.0                 



[facility] CenterXttqivpMGZMTQHBMF3833-23-17 09:36:00





             Test Item    Value        Reference Range Interpretation Comments

 

             Basophils (test code = 0.8          See_Comment                [Aut

omated message] The



             Basophils)                                          system which ge

nerated



                                                                 this result tra

nsmitted



                                                                 reference range

: <=1.0.



                                                                 The reference r

zhen was



                                                                 not used to int

erpret



                                                                 this result as



                                                                 normal/abnormal

.



[facility] CenterCozesxzZJESLVYCLB9821-34-22 09:36:00





             Test Item    Value        Reference Range Interpretation Comments

 

             Monocytes (test code = Monocytes) 10.5         2.0-12.0            

      



[facility] CenterGxaywyiPEBULBSRYF8760-69-21 09:36:00





             Test Item    Value        Reference Range Interpretation Comments

 

             Eosinophils (test code = 3.3          See_Comment                [A

utomated message] The



             Eosinophils)                                        system which ge

nerated



                                                                 this result tra

nsmitted



                                                                 reference range

: <=4.0.



                                                                 The reference r

zhen was



                                                                 not used to int

erpret



                                                                 this result as



                                                                 normal/abnormal

.



[facility] CenterAjnklxjFAZDFHPULA2071-82-67 09:36:00





             Test Item    Value        Reference Range Interpretation Comments

 

             Segs-Bands # (test code = Segs-Bands #) 3.9          1.5-8.1       

            



[place] [address]2018-05-06 21:02:00





             Test Item    Value        Reference Range Interpretation Comments

 

             Vanco Tr TND (test code = Vanco Tr 15:30pm                         

       



             TND)                                                



[place] [address]2018-05-06 21:02:00





             Test Item    Value        Reference Range Interpretation Comments

 

             Vanco Tr (test code = Vanco Tr) 9.1                                

    



[facility]36[facility]





             Test Item    Value        Reference Range Interpretation Comments

 

             Vanco Tr TND (test code = Vanco Tr 15:30pm                         

       



             TND)                                                



[place] [id number] 21:02:00





             Test Item    Value        Reference Range Interpretation Comments

 

             Vanco Tr (test code = Vanco Tr) 9.1                                

    



[facility] [id number]07:[facility]





             Test Item    Value        Reference Range Interpretation Comments

 

             Glucose Lvl (test code = Glucose Lvl) 74           70-99           

          



[place] 028598-05-06 07:07:00





             Test Item    Value        Reference Range Interpretation Comments

 

             BUN (test code = BUN) 12           7-22                      



[place] 028598-05-06 07:07:00





             Test Item    Value        Reference Range Interpretation Comments

 

             Creatinine Lvl (test code = Creatinine 0.75         0.50-1.40      

           



             Lvl)                                                



[place] 028598-05-06 07:07:00





             Test Item    Value        Reference Range Interpretation Comments

 

             eGFR (test code = eGFR) 140                                    



[place] 028598-05-06 07:07:00





             Test Item    Value        Reference Range Interpretation Comments

 

             Albumin Lvl (test code = Albumin Lvl) 2.9          3.5-5.0         

          



[place] [address]-05-06 07:07:00





             Test Item    Value        Reference Range Interpretation Comments

 

             Globulin (test code = Globulin) 4.4          2.7-4.2               

    



[place] [address]-05-06 07:07:00





             Test Item    Value        Reference Range Interpretation Comments

 

             A/G Ratio (test code = A/G Ratio) 0.7 1        0.7-1.6             

      



[place] [address]-05-06 07:07:00





             Test Item    Value        Reference Range Interpretation Comments

 

             Bili Total (test code = Bili Total) 0.4          0.2-1.3           

        



[place] [address]-05-06 07:07:00





             Test Item    Value        Reference Range Interpretation Comments

 

             Alk Phos (test code = Alk Phos) 71                           

    



[place] 028598-05-06 07:07:00





             Test Item    Value        Reference Range Interpretation Comments

 

             AST (test code = AST) 15           See_Comment                [Auto

mated message] The



                                                                 system which ge

nerated this



                                                                 result transmit

huma



                                                                 reference range

: <=37. The



                                                                 reference range

 was not



                                                                 used to interpr

et this



                                                                 result as zayda

l/abnormal.



[place] 028598-05-06 07:07:00





             Test Item    Value        Reference Range Interpretation Comments

 

             ALT (test code = ALT) 28           See_Comment                [Auto

mated message] The



                                                                 system which ge

nerated this



                                                                 result transmit

huma



                                                                 reference range

: <=65. The



                                                                 reference range

 was not



                                                                 used to interpr

et this



                                                                 result as zayda

l/abnormal.



[place] 028598-05-06 07:07:00





             Test Item    Value        Reference Range Interpretation Comments

 

             Potassium Lvl (test code = Potassium 4.4          3.5-5.1          

         



             Lvl)                                                



[facility]2018-05-06 07:07:00





             Test Item    Value        Reference Range Interpretation Comments

 

             Sodium Lvl (test code = Sodium Lvl) 147          135-145           

        



[facility]2018-05-06 07:07:00





             Test Item    Value        Reference Range Interpretation Comments

 

             CO2 (test code = CO2) 25           24-32                     



[facility]2018-05-06 07:07:00





             Test Item    Value        Reference Range Interpretation Comments

 

             Chloride Lvl (test code = Chloride Lvl) 114                  

            



[place] 028598-05-06 07:07:00





             Test Item    Value        Reference Range Interpretation Comments

 

             B/C Ratio (test code = B/C Ratio) 16 1         6-25                

      



[place] 028598-05-06 07:07:00





             Test Item    Value        Reference Range Interpretation Comments

 

             Calcium Lvl (test code = Calcium Lvl) 8.7          8.5-10.5        

          



[facility]2018-05-06 07:07:00





             Test Item    Value        Reference Range Interpretation Comments

 

             AGAP (test code = AGAP) 12.4         10.0-20.0                 



[facility]2018-05-06 07:07:00





             Test Item    Value        Reference Range Interpretation Comments

 

             Total Protein (test code = Total 7.3          6.4-8.4              

     



             Protein)                                            



[facility] CenterLtgsiltGHBXPXBMOG4278-15-83 07:07:00





             Test Item    Value        Reference Range Interpretation Comments

 

             Platelet (test code = Platelet) 345          133-450               

    



[facility] CenterIjeflpnGJFAAPCGAY4628-29-39 07:07:00





             Test Item    Value        Reference Range Interpretation Comments

 

             MPV (test code = MPV) 8.7          7.4-10.4                  



[facility] CenterSuqrjelJQVCIENGYE6269-48-23 07:07:00





             Test Item    Value        Reference Range Interpretation Comments

 

             WBC (test code = WBC) 6.9          3.7-10.4                  



[facility] CenterXnzlyruDQWQRLUZFK1800-52-71 07:07:00





             Test Item    Value        Reference Range Interpretation Comments

 

             RBC (test code = RBC) 5.30         4.70-6.10                 



[facility] CenterMabojyjLAWLGCSXKG9251-39-85 07:07:00





             Test Item    Value        Reference Range Interpretation Comments

 

             MCHC (test code = MCHC) 32.8         32.0-36.0                 



[place] 034048-05-06 07:07:00





             Test Item    Value        Reference Range Interpretation Comments

 

             MCH (test code = MCH) 27.9 pg      27.0-31.0                 



[facility] CenterYuipisxHWDOCPNOLW8111-70-65 07:07:00





             Test Item    Value        Reference Range Interpretation Comments

 

             Hgb (test code = Hgb) 14.8         14.0-18.0                 



[facility] CenterGnojlcyHMYTOBUKMU4453-38-60 07:07:00





             Test Item    Value        Reference Range Interpretation Comments

 

             MCV (test code = MCV) 85.0         80.0-94.0                 



[facility] CenterYvzohvoYGOFUSZUBJ2445-27-86 07:07:00





             Test Item    Value        Reference Range Interpretation Comments

 

             Hct (test code = Hct) 45.1         42.0-54.0                 



[facility] CenterRzzongoUUDORLYUFF8914-42-26 07:07:00





             Test Item    Value        Reference Range Interpretation Comments

 

             RDW (test code = RDW) 17.5         11.5-14.5                 



[facility] CenterIenbzelUXMTVGMDII4483-47-37 07:07:00





             Test Item    Value        Reference Range Interpretation Comments

 

             Eosinophils # (test code 0.2          See_Comment                [A

utomated message] The



             = Eosinophils #)                                        system whic

h generated



                                                                 this result tra

nsmitted



                                                                 reference range

: <=0.5.



                                                                 The reference r

zhen was



                                                                 not used to int

erpret



                                                                 this result as



                                                                 normal/abnormal

.



[facility] CenterXcqnauuAPSZQZFDKG1704-75-07 07:07:00





             Test Item    Value        Reference Range Interpretation Comments

 

             Lymphocytes # (test code = Lymphocytes 2.0          1.0-5.5        

           



             #)                                                  



[facility] CenterLfxsnalMHPVMQDMKV7265-09-25 07:07:00





             Test Item    Value        Reference Range Interpretation Comments

 

             Monocytes # (test code 0.7          See_Comment                [Aut

omated message] The



             = Monocytes #)                                        system which 

generated



                                                                 this result tra

nsmitted



                                                                 reference range

: <=0.8.



                                                                 The reference r

zhen was



                                                                 not used to int

erpret



                                                                 this result as



                                                                 normal/abnormal

.



[facility] CenterIcfwmavELMBDBGQVD2666-23-45 07:07:00





             Test Item    Value        Reference Range Interpretation Comments

 

             Eosinophils (test code = 2.4          See_Comment                [A

utomated message] The



             Eosinophils)                                        system which ge

nerated



                                                                 this result tra

nsmitted



                                                                 reference range

: <=4.0.



                                                                 The reference r

zhen was



                                                                 not used to int

erpret



                                                                 this result as



                                                                 normal/abnormal

.



[facility] CenterOgirgjoHXNLNUTDUN7868-46-02 07:07:00





             Test Item    Value        Reference Range Interpretation Comments

 

             Basophils (test code = 0.6          See_Comment                [Aut

omated message] The



             Basophils)                                          system which ge

nerated



                                                                 this result tra

nsmitted



                                                                 reference range

: <=1.0.



                                                                 The reference r

zhen was



                                                                 not used to int

erpret



                                                                 this result as



                                                                 normal/abnormal

.



[facility] CenterPshdpusOTQDEEYLIT9822-44-68 07:07:00





             Test Item    Value        Reference Range Interpretation Comments

 

             Segs-Bands # (test code = Segs-Bands #) 4.0          1.5-8.1       

            



[facility] CenterUupgfxrIMGGSAOVMF7097-45-13 07:07:00





             Test Item    Value        Reference Range Interpretation Comments

 

             Monocytes (test code = Monocytes) 10.4         2.0-12.0            

      



[facility] CenterFkdjfrjCOTTAINGMK0435-60-05 07:07:00





             Test Item    Value        Reference Range Interpretation Comments

 

             RBC Morph (test code = Normal (18 2:07 AM)                     

      



             RBC Morph)                                          



[facility] CenterSgumljuQONDZGWTNQ3728-32-31 07:07:00





             Test Item    Value        Reference Range Interpretation Comments

 

             Segs (test code = Segs) 58.1         45.0-75.0                 



[facility] CenterIxbdgrgEABUSUUAUG2504-20-89 07:07:00





             Test Item    Value        Reference Range Interpretation Comments

 

             Plt Morph (test code = Normal (18 2:07 AM)                     

      



             Plt Morph)                                          



[facility] CenterElvdrwsRCTQQEEGZD0270-40-88 07:07:00





             Test Item    Value        Reference Range Interpretation Comments

 

             Lymphocytes (test code = Lymphocytes) 28.5         20.0-40.0       

          



[facility]2018-05-06 07:07:00





             Test Item    Value        Reference Range Interpretation Comments

 

             Glucose Lvl (test code = Glucose Lvl) 74           70-99           

          



[facility]2018-05-06 07:07:00





             Test Item    Value        Reference Range Interpretation Comments

 

             BUN (test code = BUN) 12           7-22                      



[facility]2018-05-06 07:07:00





             Test Item    Value        Reference Range Interpretation Comments

 

             Creatinine Lvl (test code = Creatinine 0.75         0.50-1.40      

           



             Lvl)                                                



[facility]2018-05-06 07:07:00





             Test Item    Value        Reference Range Interpretation Comments

 

             eGFR (test code = eGFR) 140                                    



[facility]2018-05-06 07:07:00





             Test Item    Value        Reference Range Interpretation Comments

 

             Albumin Lvl (test code = Albumin Lvl) 2.9          3.5-5.0         

          



[facility]2018-05-06 07:07:00





             Test Item    Value        Reference Range Interpretation Comments

 

             Globulin (test code = Globulin) 4.4          2.7-4.2               

    



[facility]2018-05-06 07:07:00





             Test Item    Value        Reference Range Interpretation Comments

 

             A/G Ratio (test code = A/G Ratio) 0.7 1        0.7-1.6             

      



[place] 028598-05-06 07:07:00





             Test Item    Value        Reference Range Interpretation Comments

 

             Bili Total (test code = Bili Total) 0.4          0.2-1.3           

        



[place] 028598-05-06 07:07:00





             Test Item    Value        Reference Range Interpretation Comments

 

             Alk Phos (test code = Alk Phos) 71                           

    



[place] 028598-05-06 07:07:00





             Test Item    Value        Reference Range Interpretation Comments

 

             AST (test code = AST) 15           See_Comment                [Auto

mated message] The



                                                                 system which ge

nerated this



                                                                 result transmit

huma



                                                                 reference range

: <=37. The



                                                                 reference range

 was not



                                                                 used to interpr

et this



                                                                 result as zayda

l/abnormal.



[facility]2018-05-06 07:07:00





             Test Item    Value        Reference Range Interpretation Comments

 

             ALT (test code = ALT) 28           See_Comment                [Auto

mated message] The



                                                                 system which ge

nerated this



                                                                 result transmit

huma



                                                                 reference range

: <=65. The



                                                                 reference range

 was not



                                                                 used to interpr

et this



                                                                 result as zayda

l/abnormal.



[facility]2018-05-06 07:07:00





             Test Item    Value        Reference Range Interpretation Comments

 

             Potassium Lvl (test code = Potassium 4.4          3.5-5.1          

         



             Lvl)                                                



[facility]2018-05-06 07:07:00





             Test Item    Value        Reference Range Interpretation Comments

 

             Sodium Lvl (test code = Sodium Lvl) 147          135-145           

        



[place] 028598-05-06 07:07:00





             Test Item    Value        Reference Range Interpretation Comments

 

             CO2 (test code = CO2) 25           24-32                     



[place] 028598-05-06 07:07:00





             Test Item    Value        Reference Range Interpretation Comments

 

             Chloride Lvl (test code = Chloride Lvl) 114                  

            



[place] 028598-05-06 07:07:00





             Test Item    Value        Reference Range Interpretation Comments

 

             B/C Ratio (test code = B/C Ratio) 16 1         6-25                

      



[facility]2018-05-06 07:07:00





             Test Item    Value        Reference Range Interpretation Comments

 

             Calcium Lvl (test code = Calcium Lvl) 8.7          8.5-10.5        

          



[facility]2018-05-06 07:07:00





             Test Item    Value        Reference Range Interpretation Comments

 

             AGAP (test code = AGAP) 12.4         10.0-20.0                 



[facility]2018-05-06 07:07:00





             Test Item    Value        Reference Range Interpretation Comments

 

             Total Protein (test code = Total 7.3          6.4-8.4              

     



             Protein)                                            



[facility] CenterZpsvowmYUSKPIXNNP1041-15-41 07:07:00





             Test Item    Value        Reference Range Interpretation Comments

 

             Platelet (test code = Platelet) 345          133450               

    



[facility] CenterThjfgumQIGBBMNRMI1517-77-21 07:07:00





             Test Item    Value        Reference Range Interpretation Comments

 

             MPV (test code = MPV) 8.7          7.4-10.4                  



[facility] CenterDurpgjgLLSZUIPCDT3164-78-25 07:07:00





             Test Item    Value        Reference Range Interpretation Comments

 

             WBC (test code = WBC) 6.9          3.7-10.4                  



[facility] CenterVuezlqmWPROPABAXV8979-51-95 07:07:00





             Test Item    Value        Reference Range Interpretation Comments

 

             RBC (test code = RBC) 5.30         4.70-6.10                 



[facility] CenterQrsojxqTPYNNWIFKU5235-50-89 07:07:00





             Test Item    Value        Reference Range Interpretation Comments

 

             MCHC (test code = MCHC) 32.8         32.0-36.0                 



[facility] CenterJdsmwtyUNHTOYOEJU2383-36-49 07:07:00





             Test Item    Value        Reference Range Interpretation Comments

 

             MCH (test code = MCH) 27.9 pg      27.0-31.0                 



[facility] CenterCrqlihmELXNRDZYQR4276-44-39 07:07:00





             Test Item    Value        Reference Range Interpretation Comments

 

             Hgb (test code = Hgb) 14.8         14.0-18.0                 



[facility] CenterKzdoaijZITKTEBPSZ8004-39-27 07:07:00





             Test Item    Value        Reference Range Interpretation Comments

 

             MCV (test code = MCV) 85.0         80.0-94.0                 



[place] 034048-05-06 07:07:00





             Test Item    Value        Reference Range Interpretation Comments

 

             Hct (test code = Hct) 45.1         42.0-54.0                 



[facility] CenterYhbylmuLCEMAZYEMT7217-31-12 07:07:00





             Test Item    Value        Reference Range Interpretation Comments

 

             RDW (test code = RDW) 17.5         11.5-14.5                 



[facility] CenterJhprmpfYCRCCFNUMH7661-68-92 07:07:00





             Test Item    Value        Reference Range Interpretation Comments

 

             Eosinophils # (test code 0.2          See_Comment                [A

utomated message] The



             = Eosinophils #)                                        system whic

h generated



                                                                 this result tra

nsmitted



                                                                 reference range

: <=0.5.



                                                                 The reference r

zhen was



                                                                 not used to int

erpret



                                                                 this result as



                                                                 normal/abnormal

.



[facility] CenterUzuzsifVOMZHHSJGZ0914-76-80 07:07:00





             Test Item    Value        Reference Range Interpretation Comments

 

             Lymphocytes # (test code = Lymphocytes 2.0          1.0-5.5        

           



             #)                                                  



[facility] CenterBzlfctcEKSGGKHOMO4843-08-62 07:07:00





             Test Item    Value        Reference Range Interpretation Comments

 

             Monocytes # (test code 0.7          See_Comment                [Aut

omated message] The



             = Monocytes #)                                        system which 

generated



                                                                 this result tra

nsmitted



                                                                 reference range

: <=0.8.



                                                                 The reference r

zhen was



                                                                 not used to int

erpret



                                                                 this result as



                                                                 normal/abnormal

.



[facility] Cent[id number] 07:07:00





             Test Item    Value        Reference Range Interpretation Comments

 

             Eosinophils (test code = 2.4          See_Comment                [A

utomated message] The



             Eosinophils)                                        system which ge

nerated



                                                                 this result tra

nsmitted



                                                                 reference range

: <=4.0.



                                                                 The reference r

zhen was



                                                                 not used to int

erpret



                                                                 this result as



                                                                 normal/abnormal

.



[facility] CenterThsoqccEYPZWNGLAR7042-03-54 07:07:00





             Test Item    Value        Reference Range Interpretation Comments

 

             Basophils (test code = 0.6          See_Comment                [Aut

omated message] The



             Basophils)                                          system which ge

nerated



                                                                 this result tra

nsmitted



                                                                 reference range

: <=1.0.



                                                                 The reference r

zhen was



                                                                 not used to int

erpret



                                                                 this result as



                                                                 normal/abnormal

.



[facility] Cent[id number] 07:07:00





             Test Item    Value        Reference Range Interpretation Comments

 

             Segs-Bands # (test code = Segs-Bands #) 4.0          1.5-8.1       

            



[facility] Cent[id number] 07:07:00





             Test Item    Value        Reference Range Interpretation Comments

 

             Monocytes (test code = Monocytes) 10.4         2.0-12.0            

      



[facility] CenterEabekryKWILIKAYMG9713-91-38 07:07:00





             Test Item    Value        Reference Range Interpretation Comments

 

             RBC Morph (test code = Normal (18 2:07 AM)                     

      



             RBC Morph)                                          



[facility] CenterAkkptxeFRBOURPUNA5627-09-10 07:07:00





             Test Item    Value        Reference Range Interpretation Comments

 

             Segs (test code = Segs) 58.1         45.0-75.0                 



[facility] CenterNlcuxbiOZASGTNBQD1613-12-65 07:07:00





             Test Item    Value        Reference Range Interpretation Comments

 

             Plt Morph (test code = Normal (18 2:07 AM)                     

      



             Plt Morph)                                          



[facility] CenterDqmbvbfQFWFFTMHOX9970-92-54 07:07:00





             Test Item    Value        Reference Range Interpretation Comments

 

             Lymphocytes (test code = Lymphocytes) 28.5         20.0-40.0       

          



[facility] CenterBdyavzoKUEFJEIEBW8105-52-29 16:07:00





             Test Item    Value        Reference Range Interpretation Comments

 

             Basophils # (test code 0.1          See_Comment                [Aut

omated message] The



             = Basophils #)                                        system which 

generated



                                                                 this result tra

nsmitted



                                                                 reference range

: <=0.2.



                                                                 The reference r

zhen was



                                                                 not used to int

erpret



                                                                 this result as



                                                                 normal/abnormal

.



[facility] CenterYyhywkuPRFHOFPLDC5101-89-51 16:07:00





             Test Item    Value        Reference Range Interpretation Comments

 

             Polychrom (test code = Moderate *ABN*(18                       

    



             Polychrom)   11:07 AM)                              



[facility] CenterTyvnzsrAIMWJJAIKU9315-41-02 16:07:00





             Test Item    Value        Reference Range Interpretation Comments

 

             Basophils # (test code 0.1          See_Comment                [Aut

omated message] The



             = Basophils #)                                        system which 

generated



                                                                 this result tra

nsmitted



                                                                 reference range

: <=0.2.



                                                                 The reference r

zhen was



                                                                 not used to int

erpret



                                                                 this result as



                                                                 normal/abnormal

.



[facility] CenterLfeztzgMCBJDPBOSK8176-33-57 16:07:00





             Test Item    Value        Reference Range Interpretation Comments

 

             Polychrom (test code = Moderate *ABN*(18                       

    



             Polychrom)   11:07 AM)                              



[place] [address]2018-05-05 06:43:00





             Test Item    Value        Reference Range Interpretation Comments

 

             Vanco Tr TND (test code = Vanco Tr TND) *                          

            



[place] [address]2018-05-05 06:43:00





             Test Item    Value        Reference Range Interpretation Comments

 

             Vanco Tr (test code = Vanco Tr) 22.3                               

    



[place] [address]2018-05-05 06:43:00





             Test Item    Value        Reference Range Interpretation Comments

 

             Vanco Tr TND (test code = Vanco Tr TND) *                          

            



[place] [address]2018-05-05 06:43:00





             Test Item    Value        Reference Range Interpretation Comments

 

             Vanco Tr (test code = Vanco Tr) 22.3                               

    



[facility]2018-05-04 11:45:00





             Test Item    Value        Reference Range Interpretation Comments

 

             Magnesium Lvl (test code = Magnesium 2.3          1.8-2.4          

         



             Lvl)                                                



[facility]2018-05-04 11:45:00





             Test Item    Value        Reference Range Interpretation Comments

 

             Phosphorus (test code = Phosphorus) 3.5          2.5-4.5           

        



[facility] CenterAffdffmRAVLQUPFTR3937-96-62 11:45:00





             Test Item    Value        Reference Range Interpretation Comments

 

             PT (test code = PT) 14.0 s       12.0-14.7                 



[facility] CenterTbigkssPVIQKZUOAG2419-44-51 11:45:00





             Test Item    Value        Reference Range Interpretation Comments

 

             PTT (test code = PTT) 37.6 s       22.9-35.8                 



[facility] CenterRamcmzxAQFODOJPER8791-22-81 11:45:00





             Test Item    Value        Reference Range Interpretation Comments

 

             INR (test code = INR) 1.08 1       0.85-1.17                 



[facility]RrygtpzCQDUQXVBAA1857-91-17 11:45:00





             Test Item    Value        Reference Range Interpretation Comments

 

             C-REACTIVE PROTEIN (test code = 13.1                               

    



             C-REACTIVE PROTEIN)                                        



[facility] CenterNalpbvyWJUGBEHELM4565-26-18 11:45:00





             Test Item    Value        Reference Range Interpretation Comments

 

             Prealbumin (test code = Prealbumin) 25.2         18.0-45.0         

        



[facility]2018-05-04 11:45:00





             Test Item    Value        Reference Range Interpretation Comments

 

             Magnesium Lvl (test code = Magnesium 2.3          1.8-2.4          

         



             Lvl)                                                



[facility]2018-05-04 11:45:00





             Test Item    Value        Reference Range Interpretation Comments

 

             Phosphorus (test code = Phosphorus) 3.5          2.5-4.5           

        



[facility] CenterWqwbtndTNZPXSNILL3094-63-16 11:45:00





             Test Item    Value        Reference Range Interpretation Comments

 

             PT (test code = PT) 14.0 s       12.0-14.7                 



[facility] CenterPmynshkRSZGTRNRCG1659-44-30 11:45:00





             Test Item    Value        Reference Range Interpretation Comments

 

             PTT (test code = PTT) 37.6 s       22.9-35.8                 



[facility] CenterIjzbmwnWCNOFIWOMC3475-72-26 11:45:00





             Test Item    Value        Reference Range Interpretation Comments

 

             INR (test code = INR) 1.08 1       0.85-1.17                 



[facility] CenterBtwbsjaKQOEESGBMX0945-14-14 11:45:00





             Test Item    Value        Reference Range Interpretation Comments

 

             C-REACTIVE PROTEIN (test code = 13.1                               

    



             C-REACTIVE PROTEIN)                                        



[facility] CenterWdvqieqEHMJPRDUVD5070-01-32 11:45:00





             Test Item    Value        Reference Range Interpretation Comments

 

             Prealbumin (test code = Prealbumin) 25.2         18.0-45.0         

        



[facility]2018-05-04 03:06:00





             Test Item    Value        Reference Range Interpretation Comments

 

             Lactic Acid Lvl (test code = Lactic 0.9          0.5-2.2           

        



             Acid Lvl)                                           



[facility] CenterEndigwyZOUTEEMJKZ3322-33-87 03:06:00





             Test Item    Value        Reference Range Interpretation Comments

 

             Sed Rate (test code = 5            See_Comment                [Auto

mated message] The



             Sed Rate)                                           system which ge

nerated this



                                                                 result transmit

huma



                                                                 reference range

: <=15. The



                                                                 reference range

 was not



                                                                 used to interpr

et this



                                                                 result as zayda

l/abnormal.



[facility] CenterWopilbhRFTWKKDCET8088-11-76 03:06:00





             Test Item    Value        Reference Range Interpretation Comments

 

             C-REACTIVE PROTEIN (test code = 15.8                               

    



             C-REACTIVE PROTEIN)                                        



[facility]2018-05-04 03:06:00





             Test Item    Value        Reference Range Interpretation Comments

 

             Lactic Acid Lvl (test code = Lactic 0.9          0.5-2.2           

        



             Acid Lvl)                                           



[facility] CenterKesbvmuGPHSTAZZAV8130-06-21 03:06:00





             Test Item    Value        Reference Range Interpretation Comments

 

             Sed Rate (test code = 5            See_Comment                [Auto

mated message] The



             Sed Rate)                                           system which ge

nerated this



                                                                 result transmit

huma



                                                                 reference range

: <=15. The



                                                                 reference range

 was not



                                                                 used to interpr

et this



                                                                 result as zayda

l/abnormal.



[facility] CenterAncdwvkLYTRCTIVJF4487-97-90 03:06:00





             Test Item    Value        Reference Range Interpretation Comments

 

             C-REACTIVE PROTEIN (test code = 15.8                               

    



             C-REACTIVE PROTEIN)                                        



[facility] CenterEcrnorwXXPLHFYKQY9615-16-81 00:44:00





             Test Item    Value        Reference Range Interpretation Comments

 

             PT (test code = PT) 13.0 s       12.0-14.7                 



[facility] CenterAodfvzbHOPWBENIEU3900-87-21 00:44:00





             Test Item    Value        Reference Range Interpretation Comments

 

             INR (test code = INR) 0.98 1       0.85-1.17                 



[facility] CenterRfiscjcMLWXITTGXM4331-02-41 00:44:00





             Test Item    Value        Reference Range Interpretation Comments

 

             PTT (test code = PTT) 33.2 s       22.9-35.8                 



[facility] CenterSnrdtkkKIQYUDPHBY4903-11-59 00:44:00





             Test Item    Value        Reference Range Interpretation Comments

 

             PT (test code = PT) 13.0 s       12.0-14.7                 



[facility] CenterStdqdtoWERWVJKIJO9766-07-28 00:44:00





             Test Item    Value        Reference Range Interpretation Comments

 

             INR (test code = INR) 0.98 1       0.85-1.17                 



[facility] CenterGuogyofSMSTVJYCXP4501-23-76 00:44:00





             Test Item    Value        Reference Range Interpretation Comments

 

             PTT (test code = PTT) 33.2 s       22.9-35.8                 



[facility] VUMUZWS3212-79-15 23:51:00





             Test Item    Value        Reference Range Interpretation Comments

 

             Antibody Scrn (test Negative (5/3/18 6:51                          

 



             code = Antibody Scrn) PM)                                    



[facility] RIHZYQY6326-99-81 23:51:00





             Test Item    Value        Reference Range Interpretation Comments

 

             ABO/Rh (test code = ABO/Rh) AB POS                                 



[facility] [id number] 23:51:00





             Test Item    Value        Reference Range Interpretation Comments

 

             Antibody Scrn (test Negative (5/3/18 6:51                          

 



             code = Antibody Scrn) PM)                                    



[facility] BANK [id number] 23:51:00





             Test Item    Value        Reference Range Interpretation Comments

 

             ABO/Rh (test code = ABO/Rh) AB POS                                 



[facility] AND [id number] 23:35:00





             Test Item    Value        Reference Range Interpretation Comments

 

             UA Urobilinogen (test code = UA 0.2          0.1-1.0               

    



             Urobilinogen)                                        



[facility] AND [id number] 23:35:00





             Test Item    Value        Reference Range Interpretation Comments

 

             UA Nitrite (test code Negative (5/3/18 6:35                        

   



             = UA Nitrite) PM)                                    



[facility] AND  23:35:00





             Test Item    Value        Reference Range Interpretation Comments

 

             UA Glucose (test code Negative (5/3/18 6:35                        

   



             = UA Glucose) PM)                                    



[facility] AND  23:35:00





             Test Item    Value        Reference Range Interpretation Comments

 

             UA Ketones (test code Negative *NA*(5/3/18                         

  



             = UA Ketones) 6:35 PM)                               



[facility] AND  23:35:00





             Test Item    Value        Reference Range Interpretation Comments

 

             UA Bili (test code = Negative *NA*(5/3/18                          

 



             UA Bili)     6:35 PM)                               



[facility] AND  23:35:00





             Test Item    Value        Reference Range Interpretation Comments

 

             UA Blood (test code = Trace *ABN*(5/3/18                           



             UA Blood)    6:35 PM)                               



[facility] AND [id number] 23:35:00





             Test Item    Value        Reference Range Interpretation Comments

 

             UA Leuk Est (test code Small *ABN*(5/3/18 6:35                     

      



             = UA Leuk Est) PM)                                    



[facility] AND [id number] 23:35:00





             Test Item    Value        Reference Range Interpretation Comments

 

             UA Spec Grav (test code = UA Spec 1.020 1                          

      



             Grav)                                               



[facility] AND [id number] 23:35:00





             Test Item    Value        Reference Range Interpretation Comments

 

             UA pH (test code = UA pH) 6.0 1        5.0-8.0                   



[facility] AND [id number] 23:35:00





             Test Item    Value        Reference Range Interpretation Comments

 

             UA Color (test code = Yellow *NA*(5/3/18 6:35                      

     



             UA Color)    PM)                                    



[facility] AND  23:35:00





             Test Item    Value        Reference Range Interpretation Comments

 

             UA Protein (test code = Trace *ABN*(5/3/18                         

  



             UA Protein)  6:35 PM)                               



[facility] AND [id number] 23:35:00





             Test Item    Value        Reference Range Interpretation Comments

 

             UA Turbidity (test code Slight Cloudy (5/3/18                      

     



             = UA Turbidity) 6:35 PM)                               



[facility] AND [id number] 23:35:00





             Test Item    Value        Reference Range Interpretation Comments

 

             UA Hyal Cast 0-2 (5/3/18 6:35 See_Comment                [Automated

 message]



             (test code = UA PM)                                    The system w

dona



             Hyal Cast)                                          generated this 

result



                                                                 transmitted ref

erence



                                                                 range: <=2. The



                                                                 reference range

 was



                                                                 not used to int

erpret



                                                                 this result as



                                                                 normal/abnormal

.



[facility] AND [id number] 23:35:00





             Test Item    Value        Reference Range Interpretation Comments

 

             UA Bacteria (test code = UA Occasional /HPF                        

   



             Bacteria)                                           



[facility] AND [id number] 23:35:00





             Test Item    Value        Reference Range Interpretation Comments

 

             UA RBC (test code 11-20 /HPF   See_Comment                [Automate

d message] The



             = UA RBC)                                           system which ge

nerated



                                                                 this result tra

nsmitted



                                                                 reference range

: <=2. The



                                                                 reference range

 was not



                                                                 used to interpr

et this



                                                                 result as



                                                                 normal/abnormal

.



[facility] AND [id number] 23:35:00





             Test Item    Value        Reference Range Interpretation Comments

 

             UA Sq Epi (test code = UA Sq Occasional /LPF                       

    



             Epi)                                                



[facility] AND [id number] 23:35:00





             Test Item    Value        Reference Range Interpretation Comments

 

             UA WBC (test code = UA WBC)  /HPF                            



[facility] AND [id number] 23:35:00





             Test Item    Value        Reference Range Interpretation Comments

 

             UA Urobilinogen (test code = UA 0.2          0.1-1.0               

    



             Urobilinogen)                                        



[facility] AND [id number] 23:35:00





             Test Item    Value        Reference Range Interpretation Comments

 

             UA Nitrite (test code Negative (5/3/18 6:35                        

   



             = UA Nitrite) PM)                                    



[facility] AND [id number] 23:35:00





             Test Item    Value        Reference Range Interpretation Comments

 

             UA Glucose (test code Negative (5/3/18 6:35                        

   



             = UA Glucose) PM)                                    



[facility] AND [id number] 23:35:00





             Test Item    Value        Reference Range Interpretation Comments

 

             UA Ketones (test code Negative *NA*(5/3/18                         

  



             = UA Ketones) 6:35 PM)                               



[facility] AND [id number] 23:35:00





             Test Item    Value        Reference Range Interpretation Comments

 

             UA Bili (test code = Negative *NA*(5/3/18                          

 



             UA Bili)     6:35 PM)                               



[facility] AND [id number] 23:35:00





             Test Item    Value        Reference Range Interpretation Comments

 

             UA Blood (test code = Trace *ABN*(5/3/18                           



             UA Blood)    6:35 PM)                               



[facility] AND [id number] 23:35:00





             Test Item    Value        Reference Range Interpretation Comments

 

             UA Leuk Est (test code Small *ABN*(5/3/18 6:35                     

      



             = UA Leuk Est) PM)                                    



[facility] AND [id number] 23:35:00





             Test Item    Value        Reference Range Interpretation Comments

 

             UA Spec Grav (test code = UA Spec 1.020 1                          

      



             Grav)                                               



[facility] AND [id number] 23:35:00





             Test Item    Value        Reference Range Interpretation Comments

 

             UA pH (test code = UA pH) 6.0 1        5.0-8.0                   



[facility] AND  23:35:00





             Test Item    Value        Reference Range Interpretation Comments

 

             UA Color (test code = Yellow *NA*(5/3/18 6:35                      

     



             UA Color)    PM)                                    



[facility] AND [id number] 23:35:00





             Test Item    Value        Reference Range Interpretation Comments

 

             UA Protein (test code = Trace *ABN*(5/3/18                         

  



             UA Protein)  6:35 PM)                               



[facility] AND [id number] 23:35:00





             Test Item    Value        Reference Range Interpretation Comments

 

             UA Turbidity (test code Slight Cloudy (5/3/18                      

     



             = UA Turbidity) 6:35 PM)                               



[facility] AND [id number] 23:35:00





             Test Item    Value        Reference Range Interpretation Comments

 

             UA Hyal Cast 0-2 (5/3/18 6:35 See_Comment                [Automated

 message]



             (test code = UA PM)                                    The system w

Thounds



             Hyal Cast)                                          generated this 

result



                                                                 transmitted ref

erence



                                                                 range: <=2. The



                                                                 reference range

 was



                                                                 not used to int

erpret



                                                                 this result as



                                                                 normal/abnormal

.



[facility] AND [id number] 23:35:00





             Test Item    Value        Reference Range Interpretation Comments

 

             UA Bacteria (test code = UA Occasional /HPF                        

   



             Bacteria)                                           



[facility] AND [id number] 23:35:00





             Test Item    Value        Reference Range Interpretation Comments

 

             UA RBC (test code 11-20 /HPF   See_Comment                [Automate

d message] The



             = UA RBC)                                           system which ge

nerated



                                                                 this result tra

nsmitted



                                                                 reference range

: <=2. The



                                                                 reference range

 was not



                                                                 used to interpr

et this



                                                                 result as



                                                                 normal/abnormal

.



[facility] AND [id number] 23:35:00





             Test Item    Value        Reference Range Interpretation Comments

 

             UA Sq Epi (test code = UA Sq Occasional /LPF                       

    



             Epi)                                                



[facility] AND [id number] 23:35:00





             Test Item    Value        Reference Range Interpretation Comments

 

             UA WBC (test code = UA WBC)  /HPF                            



[id number] 23:20:00





             Test Item    Value        Reference Range Interpretation Comments

 

             Basophils # (test code 0.1          See_Comment                [Aut

omated message] The



             = Basophils #)                                        system which 

generated



                                                                 this result tra

nsmitted



                                                                 reference range

: <=0.2.



                                                                 The reference r

zhen was



                                                                 not used to int

erpret



                                                                 this result as



                                                                 normal/abnormal

.



[facility] [id number] 23:20:00





             Test Item    Value        Reference Range Interpretation Comments

 

             Polychrom (test code = Polychrom) Slight                           

      



[facility] [id number] 23:20:00





             Test Item    Value        Reference Range Interpretation Comments

 

             Plt Morph (test code = Normal (5/3/18 6:20 PM)                     

      



             Plt Morph)                                          



[facility] [id number] 23:20:00





             Test Item    Value        Reference Range Interpretation Comments

 

             Basophils # (test code 0.1          See_Comment                [Aut

omated message] The



             = Basophils #)                                        system which 

generated



                                                                 this result tra

nsmitted



                                                                 reference range

: <=0.2.



                                                                 The reference r

zhen was



                                                                 not used to int

erpret



                                                                 this result as



                                                                 normal/abnormal

.



[facility] [id number] 23:20:00





             Test Item    Value        Reference Range Interpretation Comments

 

             Polychrom (test code = Polychrom) Slight                           

      



[facility] [id number] 23:20:00





             Test Item    Value        Reference Range Interpretation Comments

 

             Plt Morph (test code = Normal (5/3/18 6:20 PM)                     

      



             Plt Morph)                                          



[id number]22:00





             Test Item    Value        Reference Range Interpretation Comments

 

             CULTURE (BEAKER) (test No growth in 5 days                         

  



             code = 1095)                                        



BLOOD [id number] 16:22:00





             Test Item    Value        Reference Range Interpretation Comments

 

             CULTURE (BEAKER) (test No growth in 5 days                         

  



             code = 1095)                                        



(MANUAL DIFFERENTIAL)2017 22:29:00





             Test Item    Value        Reference Range Interpretation Comments

 

             TOTAL COUNTED (BEAKER) (test code =                                

        



             1351)                                               

 

             WBC MORPHOLOGY (BEAKER) (test code = Normal                        

         



             487)                                                

 

             PLT MORPHOLOGY (BEAKER) (test code = Normal                        

         



             486)                                                

 

             RBC MORPHOLOGY (BEAKER) (test code = Normal                        

         



             762)                                                



CBC W/PLT COUNT &amp; AUTO [id number] 22:28:00





             Test Item    Value        Reference Range Interpretation Comments

 

             WHITE BLOOD CELL COUNT (BEAKER) 7.3 K/ L     4.0-10.0              

    



             (test code = 775)                                        

 

             RED BLOOD CELL COUNT (BEAKER) 5.09 M/ L    4.20-5.80               

  



             (test code = 761)                                        

 

             HEMOGLOBIN (BEAKER) (test code = 13.0 GM/DL   13.0-16.8            

     



             410)                                                

 

             HEMATOCRIT (BEAKER) (test code = 42.3 %       40.0-50.0            

     



             411)                                                

 

             MEAN CORPUSCULAR VOLUME (BEAKER) 83.0 fL      82.0-98.0            

     



             (test code = 753)                                        

 

             MEAN CORPUSCULAR HEMOGLOBIN 25.6 pg      27.0-33.0    L            



             (BEAKER) (test code = 751)                                        

 

             MEAN CORPUSCULAR HEMOGLOBIN CONC 30.8 GM/DL   32.0-36.0    L       

     



             (BEAKER) (test code = 752)                                        

 

             RED CELL DISTRIBUTION WIDTH 18.0 %       10.3-14.2    H            



             (BEAKER) (test code = 412)                                        

 

             PLATELET COUNT (BEAKER) (test 331 K/CU MM  150-430                 

  



             code = 756)                                         

 

             MEAN PLATELET VOLUME (BEAKER) 8.5 fL       6.5-10.5                

  



             (test code = 754)                                        

 

             NUCLEATED RED BLOOD CELLS 0 /100 WBC   0-0                       



             (BEAKER) (test code = 413)                                        

 

             NEUTROPHILS RELATIVE PERCENT 65 %                                  

 



             (BEAKER) (test code = 429)                                        

 

             LYMPHOCYTES RELATIVE PERCENT 23 %                                  

 



             (BEAKER) (test code = 430)                                        

 

             MONOCYTES RELATIVE PERCENT 8 %                                    



             (BEAKER) (test code = 431)                                        

 

             EOSINOPHILS RELATIVE PERCENT 3 %                                   

 



             (BEAKER) (test code = 432)                                        

 

             BASOPHILS RELATIVE PERCENT 1 %                                    



             (BEAKER) (test code = 437)                                        

 

             NEUTROPHILS ABSOLUTE COUNT 4.75 K/ L    1.80-8.00                 



             (BEAKER) (test code = 670)                                        

 

             LYMPHOCYTES ABSOLUTE COUNT 1.68 K/ L    1.48-4.50                 



             (BEAKER) (test code = 414)                                        

 

             MONOCYTES ABSOLUTE COUNT (BEAKER) 0.55 K/ L    0.00-1.30           

      



             (test code = 415)                                        

 

             EOSINOPHILS ABSOLUTE COUNT 0.24 K/ L    0.00-0.50                 



             (BEAKER) (test code = 416)                                        

 

             BASOPHILS ABSOLUTE COUNT (BEAKER) 0.05 K/ L    0.00-0.20           

      



             (test code = 417)                                        



[id number] METABOLIC [id number] 11:11:00





             Test Item    Value        Reference Range Interpretation Comments

 

             SODIUM (BEAKER) 138 meq/L    136-145                   



             (test code = 381)                                        

 

             POTASSIUM (BEAKER) 4.0 meq/L    3.5-5.1                   



             (test code = 379)                                        

 

             CHLORIDE (BEAKER) 108 meq/L           H            



             (test code = 382)                                        

 

             CO2 (BEAKER) (test 23 meq/L     22-29                     



             code = 355)                                         

 

             BLOOD UREA NITROGEN 11 mg/dL     7-21                      



             (BEAKER) (test code                                        



             = 354)                                              

 

             CREATININE (BEAKER) 0.74 mg/dL   0.57-1.25                 



             (test code = 358)                                        

 

             GLUCOSE RANDOM 124 mg/dL           H            



             (BEAKER) (test code                                        



             = 652)                                              

 

             CALCIUM (BEAKER) 8.9 mg/dL    8.4-10.2                  



             (test code = 697)                                        

 

             EGFR (BEAKER) (test 150 mL/min/1.73                           ESTIM

ATED GFR IS



             code = 1092) sq m                                   NOT AS ACCURATE

 AS



                                                                 CREATININE



                                                                 CLEARANCE IN



                                                                 PREDICTING



                                                                 GLOMERULAR



                                                                 FILTRATION RATE

.



                                                                 ESTIMATED GFR I

S



                                                                 NOT APPLICABLE 

FOR



                                                                 DIALYSIS PATIEN

TS.



URINE [id number] 09:56:00





             Test Item    Value        Reference Range Interpretation Comments

 

             CULTURE (BEAKER) (test <10,000 col/mL skin                         

  



             code = 1095) jaime                                  



COMPREHENSIVE METABOLIC [id number] 08:49:00





             Test Item    Value        Reference Range Interpretation Comments

 

             TOTAL PROTEIN 7.3 gm/dL    6.0-8.3                   



             (BEAKER) (test code =                                        



             770)                                                

 

             ALBUMIN (BEAKER) 3.3 g/dL     3.5-5.0      L            



             (test code = 1145)                                        

 

             ALKALINE PHOSPHATASE 89 U/L                           



             (BEAKER) (test code =                                        



             346)                                                

 

             BILIRUBIN TOTAL 1.4 mg/dL    0.2-1.2      H            



             (BEAKER) (test code =                                        



             377)                                                

 

             SODIUM (BEAKER) (test 137 meq/L    136-145                   



             code = 381)                                         

 

             POTASSIUM (BEAKER) 3.7 meq/L    3.5-5.1                   



             (test code = 379)                                        

 

             CHLORIDE (BEAKER) 108 meq/L           H            



             (test code = 382)                                        

 

             CO2 (BEAKER) (test 17 meq/L     22-29        L            



             code = 355)                                         

 

             BLOOD UREA NITROGEN 12 mg/dL     7-21                      



             (BEAKER) (test code =                                        



             354)                                                

 

             CREATININE (BEAKER) 0.78 mg/dL   0.57-1.25                 



             (test code = 358)                                        

 

             GLUCOSE RANDOM 119 mg/dL           H            



             (BEAKER) (test code =                                        



             652)                                                

 

             CALCIUM (BEAKER) 8.6 mg/dL    8.4-10.2                  



             (test code = 697)                                        

 

             AST (SGOT) (BEAKER) 13 U/L       5-34                      



             (test code = 353)                                        

 

             ALT (SGPT) (BEAKER) 28 U/L       6-55                      



             (test code = 347)                                        

 

             EGFR (BEAKER) (test 141                                    ESTIMATE

D GFR IS



             code = 1092) mL/min/1.73 sq                           NOT AS ACCURA

TE AS



                          m                                      CREATININE



                                                                 CLEARANCE IN



                                                                 PREDICTING



                                                                 GLOMERULAR



                                                                 FILTRATION RATE

.



                                                                 ESTIMATED GFR I

S



                                                                 NOT APPLICABLE 

FOR



                                                                 DIALYSIS PATIEN

TS.



CBC W/PLT COUNT &amp; AUTO [id number] 08:46:00





             Test Item    Value        Reference Range Interpretation Comments

 

             WHITE BLOOD CELL COUNT (BEAKER) 9.4 K/ L     4.0-10.0              

    



             (test code = 775)                                        

 

             RED BLOOD CELL COUNT (BEAKER) 4.79 M/ L    4.20-5.80               

  



             (test code = 761)                                        

 

             HEMOGLOBIN (BEAKER) (test code = 12.8 GM/DL   13.0-16.8    L       

     



             410)                                                

 

             HEMATOCRIT (BEAKER) (test code = 40.0 %       40.0-50.0            

     



             411)                                                

 

             MEAN CORPUSCULAR VOLUME (BEAKER) 83.4 fL      82.0-98.0            

     



             (test code = 753)                                        

 

             MEAN CORPUSCULAR HEMOGLOBIN 26.7 pg      27.0-33.0    L            



             (BEAKER) (test code = 751)                                        

 

             MEAN CORPUSCULAR HEMOGLOBIN CONC 32.0 GM/DL   32.0-36.0            

     



             (BEAKER) (test code = 752)                                        

 

             RED CELL DISTRIBUTION WIDTH 18.2 %       10.3-14.2    H            



             (BEAKER) (test code = 412)                                        

 

             PLATELET COUNT (BEAKER) (test 313 K/CU MM  150-430                 

  



             code = 756)                                         

 

             MEAN PLATELET VOLUME (BEAKER) 8.6 fL       6.5-10.5                

  



             (test code = 754)                                        

 

             NUCLEATED RED BLOOD CELLS 0 /100 WBC   0-0                       



             (BEAKER) (test code = 413)                                        

 

             NEUTROPHILS RELATIVE PERCENT 70 %                                  

 



             (BEAKER) (test code = 429)                                        

 

             LYMPHOCYTES RELATIVE PERCENT 16 %                                  

 



             (BEAKER) (test code = 430)                                        

 

             MONOCYTES RELATIVE PERCENT 12 %                                   



             (BEAKER) (test code = 431)                                        

 

             EOSINOPHILS RELATIVE PERCENT 1 %                                   

 



             (BEAKER) (test code = 432)                                        

 

             BASOPHILS RELATIVE PERCENT 1 %                                    



             (BEAKER) (test code = 437)                                        

 

             NEUTROPHILS ABSOLUTE COUNT 6.54 K/ L    1.80-8.00                 



             (BEAKER) (test code = 670)                                        

 

             LYMPHOCYTES ABSOLUTE COUNT 1.50 K/ L    1.48-4.50                 



             (BEAKER) (test code = 414)                                        

 

             MONOCYTES ABSOLUTE COUNT (BEAKER) 1.13 K/ L    0.00-1.30           

      



             (test code = 415)                                        

 

             EOSINOPHILS ABSOLUTE COUNT 0.13 K/ L    0.00-0.50                 



             (BEAKER) (test code = 416)                                        

 

             BASOPHILS ABSOLUTE COUNT (BEAKER) 0.06 K/ L    0.00-0.20           

      



             (test code = 417)                                        



0.00URINALYSIS W/ [id number] 20:36:00





             Test Item    Value        Reference Range Interpretation Comments

 

             COLOR (BEAKER) (test code = 470) Yellow                            

     

 

             CLARITY (BEAKER) (test code = 469) Hazy                            

       

 

             SPECIFIC GRAVITY UA (BEAKER) (test 1.012        1.001-1.035        

       



             code = 468)                                         

 

             PH UA (BEAKER) (test code = 467) 5.5          5.0-8.0              

     

 

             PROTEIN UA (BEAKER) (test code = 100 mg/dL    Negative     A       

     



             464)                                                

 

             GLUCOSE UA (BEAKER) (test code = Negative     Negative             

     



             365)                                                

 

             KETONES UA (BEAKER) (test code = Negative     Negative             

     



             371)                                                

 

             BILIRUBIN UA (BEAKER) (test code = Negative     Negative           

       



             462)                                                

 

             BLOOD UA (BEAKER) (test code = 461) Moderate     Negative     A    

        

 

             NITRITE UA (BEAKER) (test code = Negative     Negative             

     



             465)                                                

 

             LEUKOCYTE ESTERASE UA (BEAKER) Large        Negative     A         

   



             (test code = 466)                                        

 

             UROBILINOGEN UA (BEAKER) (test code 3.0 mg/dL    0.2-1.0      H    

        



             = 463)                                              

 

             RBC UA (BEAKER) (test code = 519) 19 /HPF                          

      

 

             WBC UA (BEAKER) (test code = 520) 182 /HPF                         

      

 

             SOURCE(BEAKER) (test code = 2795)                                  

      



BASIC METABOLIC [id number] 17:00:00





             Test Item    Value        Reference Range Interpretation Comments

 

             SODIUM (BEAKER) 140 meq/L    136-145                   



             (test code = 381)                                        

 

             POTASSIUM (BEAKER) 3.7 meq/L    3.5-5.1                   



             (test code = 379)                                        

 

             CHLORIDE (BEAKER) 112 meq/L           H            



             (test code = 382)                                        

 

             CO2 (BEAKER) (test 17 meq/L     22-29        L            



             code = 355)                                         

 

             BLOOD UREA NITROGEN 23 mg/dL     7-21         H            



             (BEAKER) (test code                                        



             = 354)                                              

 

             CREATININE (BEAKER) 1.00 mg/dL   0.57-1.25                 



             (test code = 358)                                        

 

             GLUCOSE RANDOM 102 mg/dL                        



             (BEAKER) (test code                                        



             = 652)                                              

 

             CALCIUM (BEAKER) 8.7 mg/dL    8.4-10.2                  



             (test code = 697)                                        

 

             EGFR (BEAKER) (test 106 mL/min/1.73                           ESTIM

ATED GFR IS



             code = 1092) sq m                                   NOT AS ACCURATE

 AS



                                                                 CREATININE



                                                                 CLEARANCE IN



                                                                 PREDICTING



                                                                 GLOMERULAR



                                                                 FILTRATION RATE

.



                                                                 ESTIMATED GFR I

S



                                                                 NOT APPLICABLE 

FOR



                                                                 DIALYSIS PATIEN

TS.



Specimen slightly ictericCBC W/PLT COUNT &amp; AUTO [id number] 
12:18:00





             Test Item    Value        Reference Range Interpretation Comments

 

             WHITE BLOOD CELL COUNT (BEAKER) 16.4 K/ L    4.0-10.0     H        

    



             (test code = 775)                                        

 

             RED BLOOD CELL COUNT (BEAKER) 5.22 M/ L    4.20-5.80               

  



             (test code = 761)                                        

 

             HEMOGLOBIN (BEAKER) (test code = 14.4 GM/DL   13.0-16.8            

     



             410)                                                

 

             HEMATOCRIT (BEAKER) (test code = 42.9 %       40.0-50.0            

     



             411)                                                

 

             MEAN CORPUSCULAR VOLUME (BEAKER) 82.2 fL      82.0-98.0            

     



             (test code = 753)                                        

 

             MEAN CORPUSCULAR HEMOGLOBIN 27.5 pg      27.0-33.0                 



             (BEAKER) (test code = 751)                                        

 

             MEAN CORPUSCULAR HEMOGLOBIN CONC 33.5 GM/DL   32.0-36.0            

     



             (BEAKER) (test code = 752)                                        

 

             RED CELL DISTRIBUTION WIDTH 16.2 %       10.3-14.2    H            



             (BEAKER) (test code = 412)                                        

 

             PLATELET COUNT (BEAKER) (test 329 K/CU MM  150-430                 

  



             code = 756)                                         

 

             MEAN PLATELET VOLUME (BEAKER) 8.2 fL       6.5-10.5                

  



             (test code = 754)                                        

 

             NUCLEATED RED BLOOD CELLS 0 /100 WBC   0-0                       



             (BEAKER) (test code = 413)                                        

 

             NEUTROPHILS RELATIVE PERCENT 83 %                                  

 



             (BEAKER) (test code = 429)                                        

 

             LYMPHOCYTES RELATIVE PERCENT 7 %                                   

 



             (BEAKER) (test code = 430)                                        

 

             MONOCYTES RELATIVE PERCENT 9 %                                    



             (BEAKER) (test code = 431)                                        

 

             EOSINOPHILS RELATIVE PERCENT 0 %                                   

 



             (BEAKER) (test code = 432)                                        

 

             BASOPHILS RELATIVE PERCENT 0 %                                    



             (BEAKER) (test code = 437)                                        

 

             NEUTROPHILS ABSOLUTE COUNT 1.62 K/ L    1.80-8.00    L            



             (BEAKER) (test code = 670)                                        

 

             LYMPHOCYTES ABSOLUTE COUNT 1.15 K/ L    1.48-4.50    L            



             (BEAKER) (test code = 414)                                        

 

             MONOCYTES ABSOLUTE COUNT (BEAKER) 1.56 K/ L    0.00-1.30    H      

      



             (test code = 415)                                        

 

             EOSINOPHILS ABSOLUTE COUNT 0.01 K/ L    0.00-0.50                 



             (BEAKER) (test code = 416)                                        

 

             BASOPHILS ABSOLUTE COUNT (BEAKER) 0.02 K/ L    0.00-0.20           

      



             (test code = 417)                                        



(MANUAL DIFFERENTIAL)2017 12:18:00





             Test Item    Value        Reference Range Interpretation Comments

 

             TOTAL COUNTED (BEAKER) (test code =                                

        



             1351)                                               

 

             WBC MORPHOLOGY (BEAKER) (test code = Normal                        

         



             487)                                                

 

             PLT MORPHOLOGY (BEAKER) (test code = Normal                        

         



             486)                                                

 

             RBC MORPHOLOGY (BEAKER) (test code = Normal                        

         



             762)

## 2022-10-02 NOTE — ER
Nurse's Notes                                                                                     

 [facility]                                                                 

Name: Redd Dale Jr                                                                            

Age: 36 yrs                                                                                       

Sex: Male                                                                                         

: 1985                                                                                   

MRN: [id number]                                                                                   

Arrival Date: 10/02/2022                                                                          

Time: 17:13                                                                                       

Account#: [id number]                                                                            

Bed 15                                                                                            

Private MD:                                                                                       

Diagnosis: UTI/ Urinary tract infection, site not specified;Headache- SHUNT , UNCHANGED         

                                                                                                  

Presentation:                                                                                     

10/02                                                                                             

17:15 Chief complaint: EMS states: RAZIA flank pain, headache, catheter pain. Pt reports        ld1 

      catheter not being changed in 60 days - orange/dark colored urine. Coronavirus screen:      

      At this time, the client does not indicate any symptoms associated with coronavirus-19.     

      Ebola Screen: No symptoms or risks identified at this time. Initial Sepsis Screen: Does     

      the patient meet any 2 criteria? No. Patient's initial sepsis screen is negative. Does      

      the patient have a suspected source of infection? No. Patient's initial sepsis screen       

      is negative. Risk Assessment: Do you want to hurt yourself or someone else? Patient         

      reports no desire to harm self or others. Onset of symptoms was 2022.           

17:15 Method Of Arrival: EMS: [facility]                                                     ld1 

17:15 Acuity: AYESHA 3                                                                           ld1 

                                                                                                  

Triage Assessment:                                                                                

17:17 General: Appears in no apparent distress. comfortable, Behavior is calm, cooperative,   ld1 

      appropriate for age. Pain: Complains of pain in low back area Pain does not radiate.        

      Pain currently is 6 out of 10 on a pain scale. Quality of pain is described as              

      throbbing. EENT: No signs and/or symptoms were reported regarding the EENT system.          

      Neuro: Level of Consciousness is awake, alert, obeys commands, Oriented to person,          

      place, time, situation. Cardiovascular: Capillary refill < 3 seconds Patient's skin is      

      warm and dry. Respiratory: Airway is patent Respiratory effort is even, unlabored. GI:      

      Abdomen is round non-distended. : Reports pain in bilateral flank(s). Derm: No signs      

      and/or symptoms reported regarding the dermatologic system. Musculoskeletal: No signs       

      and/or symptoms reported regarding the musculoskeletal system.                              

                                                                                                  

Historical:                                                                                       

- Allergies:                                                                                      

17:17 Amoxicillin;                                                                            ld1 

17:17 Bactrim;                                                                                ld1 

17:17 Ciprofloxacin;                                                                          ld1 

17:17 CLAVULANIC ACID;                                                                        ld1 

17:17 Demerol;                                                                                ld1 

17:17 Doxycycline;                                                                            ld1 

17:17 Levofloxacin;                                                                           ld1 

17:17 PENICILLINS;                                                                            ld1 

17:17 Morphine;                                                                               ld1 

17:17 Toradol;                                                                                ld1 

17:17 TRIMETHOPRIM;                                                                           ld1 

17:17 Vancomycin;                                                                             ld1 

17:17 Zofran;                                                                                 ld1 

- PMHx:                                                                                           

17:17 Asthma; Cerebral Palsy; cluster headaches; decubitus ulcers on feet; GERD;              ld1 

      Hydrocephalus; Hypertension; spina bifida;                                                  

                                                                                                  

- Immunization history:: Adult Immunizations up to date, Client reports receiving the             

  2nd dose of the Covid vaccine.                                                                  

- Social history:: Smoking status: Patient denies any tobacco usage or history of.                

  Patient/guardian denies using alcohol.                                                          

- Family history:: not pertinent.                                                                 

                                                                                                  

                                                                                                  

Screenin:19 Abuse screen: Denies threats or abuse. Denies injuries from another. Nutritional        bp  

      screening: No deficits noted. Tuberculosis screening: No symptoms or risk factors           

      identified. Fall Risk None identified.                                                      

                                                                                                  

Assessment:                                                                                       

17:30 General: SEE TRIAGE NOTE.                                                               bp  

18:45 Reassessment: PT TO CT.                                                                 bp  

                                                                                                  

Vital Signs:                                                                                      

17:15  / 82; Pulse 86; Resp 22; Temp 98.1(TE); Pulse Ox 97% on R/A; Weight 113.4 kg;    ld1 

      Pain 6/10;                                                                                  

18:46  / 77; Pulse 95; Resp 17; Pulse Ox 98% ;                                          bp  

19:00 Pain 0/10;                                                                              ke1 

20:01  / 78; Pulse 94; Resp 19; Temp 98.5(O); Pulse Ox 96% on R/A; Pain 0/10;           ld1 

                                                                                                  

Koir Coma Score:                                                                               

18:38 Eye Response: spontaneous(4). Verbal Response: oriented(5). Motor Response: obeys       arlin 

      commands(6). Total: 15.                                                                     

                                                                                                  

ED Course:                                                                                        

17:13 Patient arrived in ED.                                                                  bp  

17:17 Triage completed.                                                                       ld1 

17:17 Arm band placed on right wrist.                                                         ld1 

17:51 Luis Cruz MD is Attending Physician.                                             arlin 

18:18 Huan Martinez, YADIEL is Primary Nurse.                                                    bp  

18:19 Patient has correct armband on for positive identification. Bed in low position. Call   bp  

      light in reach. Side rails up X2.                                                           

18:45 CT Traumagram (Head C Spine CAP wo con) Sent.                                           bp  

19:30 SPT 16 fr 50 ml returned.                                                               ld1 

19:32 Fredy Gordon MD is Referral Physician.                                              [facility] 

19:32 Cuco Syed MD is Referral Physician.                                             [facility] 

                                                                                                  

Administered Medications:                                                                         

18:45 Drug: Dilaudid 3 mg Route: IM; Site: right deltoid;                                     bp  

19:00 Follow up: Pain 0/10 Adult; Response: Pain is decreased                                 ke1 

18:45 Drug: Phenergan (promethazine) 25 mg Route: IM; Site: right deltoid;                    bp  

19:00 Follow up: Response: Nausea is decreased                                                ke1 

19:45 Drug: Rocephin (cefTRIAXone) 1 grams Route: IM; Site: right ventrogluteal;              ld1 

20:47 Follow up: Response: No adverse reaction                                                ke1 

                                                                                                  

                                                                                                  

Outcome:                                                                                          

19:32 Discharge ordered by MD.                                                                [facility] 

20:48 Patient left the ED.                                                                    ke1 

                                                                                                  

Signatures:                                                                                       

Luis Cruz MD MD cha Peltier, Brian RN                      RN                                                      

Suly Jang RN                     RN   ld1                                                  

Karla Gutierrez RN                   RN   ke1                                                  

                                                                                                  

Corrections: (The following items were deleted from the chart)                                    

20:00 19:30 Ortega cath inserted, using sterile technique, returned cloudy urine. Patient      ld1 

      tolerated well. Suprapubic 50 ml urine returned ld1                                         

20:01 19:30 Ortega cath inserted, using sterile technique, 16 Fr., returned cloudy urine.      ld1 

      Patient tolerated well. Suprapubic 50 ml urine returned ld1                                 

                                                                                                  

**************************************************************************************************

## 2022-10-02 NOTE — RAD REPORT
EXAM DESCRIPTION:  CT - Head C Spine Cap Wo Con - 10/2/2022 6:59 pm

 

CLINICAL HISTORY:  Trauma, head and neck injury.  Chest, abdomen and pelvis pain.

PAIN

 

COMPARISON:  <Comparisons>

 

TECHNIQUE:  CT head without contrast.

 

CT cervical spine without contrast with coronal and sagittal reformatted images.

 

CT chest, abdomen and pelvis without contrast with coronal and sagittal reformatted images of the spi
ne.

 

All CT scans are performed using dose optimization technique as appropriate and may include automated
 exposure control or mA/KV adjustment according to patient size.

 

FINDINGS:  CT HEAD WITHOUT CONTRAST:

 

No intracranial hemorrhage, hydrocephalus or extra-axial fluid collection.  Congenital midline brain 
anomalies noted with 2 ventriculostomy tubes present.

 

The paranasal sinuses and mastoids are clear. The calvarium is intact.

 

 

CT CERVICAL SPINE WITHOUT CONTRAST:

 

No fracture or subluxation.  The prevertebral soft tissues are normal in thickness.

 

 

CT CHEST, ABDOMEN, PELVIS WITHOUT CONTRAST:

 

NOTE: Lack of contrast is a significant limitation in the assessment of trauma related findings. Spec
ifically, solid organ, vascular and bowel evaluation is significantly limited.

 

The lungs are clear.No pneumothorax or pericardial/pleural fluid.

 

No evidence of intra-abdominal visceral injury, free fluid or free air is seen within the above detai
led limitations. Ventriculostomy tubing coils in the abdomen. Suprapubic catheter noted. The a part o
f the tubing appears to extend into the prostatic urethra.

 

Spina bifida changes.

 

IMPRESSION:  Negative for acute traumatic findings within the above detailed limitations.

 

A small portion of the suprapubic catheter tubing appears to extend into prostatic urethra.

## 2022-10-02 NOTE — EDPHYS
Physician Documentation                                                                           

 [facility]                                                                 

Name: Redd Dale Jr                                                                            

Age: 36 yrs                                                                                       

Sex: Male                                                                                         

: 1985                                                                                   

MRN: [id number]                                                                                   

Arrival Date: 10/02/2022                                                                          

Time: 17:13                                                                                       

Account#: [id number]                                                                            

Bed 15                                                                                            

Private MD:                                                                                       

KRZYSZTOF Physician Luis Cruz                                                                      

HPI:                                                                                              

10/02                                                                                             

18:34 This 36 yrs old Black Male presents to ER via EMS with complaints of CHRONIC PAIN.      arlin 

18:34 The patient complains of pain to the top of head, forehead, left frontal area, left     arlin 

      side of the back of head, left occipital area, left base of the skull, right frontal        

      area, right side of the back of head, right occipital area and right base of the skull.     

      The patient describes the headache as aching. Onset: The symptoms/episode                   

      began/occurred 3 day(s) ago. The patient presents with abdominal pain right lower           

      quadrant, in the left lower quadrant. Onset: The symptoms/episode began/occurred 3          

      day(s) ago. The symptoms do not radiate. Associated signs and symptoms: The patient has     

      no apparent associated signs or symptoms. Severity of symptoms: At its worst the pain       

      was mild, moderate, in the emergency department the pain is unchanged. Headache             

      History: The patient has had previous headaches and this one is similar to previous         

      episodes.                                                                                   

                                                                                                  

Historical:                                                                                       

- Allergies:                                                                                      

17:17 Amoxicillin;                                                                            ld1 

17:17 Bactrim;                                                                                ld1 

17:17 Ciprofloxacin;                                                                          ld1 

17:17 CLAVULANIC ACID;                                                                        ld1 

17:17 Demerol;                                                                                ld1 

17:17 Doxycycline;                                                                            ld1 

17:17 Levofloxacin;                                                                           ld1 

17:17 PENICILLINS;                                                                            ld1 

17:17 Morphine;                                                                               ld1 

17:17 Toradol;                                                                                ld1 

17:17 TRIMETHOPRIM;                                                                           ld1 

17:17 Vancomycin;                                                                             ld1 

17:17 Zofran;                                                                                 ld1 

- PMHx:                                                                                           

17:17 Asthma; Cerebral Palsy; cluster headaches; decubitus ulcers on feet; GERD;              ld1 

      Hydrocephalus; Hypertension; spina bifida;                                                  

                                                                                                  

- Immunization history:: Adult Immunizations up to date, Client reports receiving the             

  2nd dose of the Covid vaccine.                                                                  

- Social history:: Smoking status: Patient denies any tobacco usage or history of.                

  Patient/guardian denies using alcohol.                                                          

- Family history:: not pertinent.                                                                 

                                                                                                  

                                                                                                  

ROS:                                                                                              

18:34 Constitutional: Negative for fever, chills, and weight loss, Eyes: Negative for injury, arlin 

      pain, redness, and discharge, ENT: Negative for injury, pain, and discharge, Neck:          

      Negative for injury, pain, and swelling, Cardiovascular: Negative for chest pain,           

      palpitations, and edema, Respiratory: Negative for shortness of breath, cough,              

      wheezing, and pleuritic chest pain, Back: Negative for injury and pain, : Negative        

      for injury, bleeding, discharge, and swelling, Skin: Negative for injury, rash, and         

      discoloration, Psych: Negative for depression, anxiety, suicide ideation, homicidal         

      ideation, and hallucinations, Allergy/Immunology: Negative for hives, rash, and             

      allergies, Endocrine: Negative for neck swelling, polydipsia, polyuria, polyphagia, and     

      marked weight changes, Hematologic/Lymphatic: Negative for swollen nodes, abnormal          

      bleeding, and unusual bruising.                                                             

18:34 Abdomen/GI: Positive for abdominal pain, abdominal cramps, abdominal distension, of the     

      right lower quadrant and left lower quadrant.                                               

18:34 MS/extremity: Positive for PARAPLEGIC.                                                      

                                                                                                  

Exam:                                                                                             

18:34 Constitutional:  This is a well developed, well nourished patient who is awake, alert,  arlin 

      and in no acute distress. Head/Face:  Normocephalic, atraumatic. Eyes:  Pupils equal        

      round and reactive to light, extra-ocular motions intact.  Lids and lashes normal.          

      Conjunctiva and sclera are non-icteric and not injected.  Cornea within normal limits.      

      Periorbital areas with no swelling, redness, or edema. ENT:  Nares patent. No nasal         

      discharge, no septal abnormalities noted.  Tympanic membranes are normal and external       

      auditory canals are clear.  Oropharynx with no redness, swelling, or masses, exudates,      

      or evidence of obstruction, uvula midline.  Mucous membranes moist. Neck:  Trachea          

      midline, no thyromegaly or masses palpated, and no cervical lymphadenopathy.  Supple,       

      full range of motion without nuchal rigidity, or vertebral point tenderness.  No            

      Meningismus. Chest/axilla:  Normal chest wall appearance and motion.  Nontender with no     

      deformity.  No lesions are appreciated. Cardiovascular:  Regular rate and rhythm with a     

      normal S1 and S2.  No gallops, murmurs, or rubs.  Normal PMI, no JVD.  No pulse             

      deficits. Respiratory:  Lungs have equal breath sounds bilaterally, clear to                

      auscultation and percussion.  No rales, rhonchi or wheezes noted.  No increased work of     

      breathing, no retractions or nasal flaring. Back:  No spinal tenderness.  No                

      costovertebral tenderness.  Full range of motion. Male :  Normal genitalia with no        

      discharge or lesions. Skin:  Warm, dry with normal turgor.  Normal color with no            

      rashes, no lesions, and no evidence of cellulitis. MS/ Extremity:  Pulses equal, no         

      cyanosis.  Neurovascular intact.  Full, normal range of motion. Neuro:  Awake and           

      alert, GCS 15, oriented to person, place, time, and situation.  Cranial nerves II-XII       

      grossly intact.  Motor strength 5/5 in all extremities.  Sensory grossly intact.            

      Cerebellar exam normal.  Normal gait. Psych:  Awake, alert, with orientation to person,     

      place and time.  Behavior, mood, and affect are within normal limits.                       

18:34 Abdomen/GI: Inspection: distension, that is mild, Bowel sounds: normal, Palpation: mild     

      abdominal tenderness, in the right lower quadrant and left lower quadrant.                  

19:29 Head/face: RIGHT SIDED SHUNT RESERVOIR SOFT AND BALLOTTABLE.                            arlin 

19:29 Neck: ROM/movement: is normal, is supple, without pain, no range of motions                 

      limitations, no meningismus, no nuchal rigidity, negative Brudzinski's sign, negative       

      Kernig's sign, limited range of motion, is not appreciated, Meningeal signs: are not        

      present, Kernig's sign is negative, Brudzinski's sign is negative, nuchal rigidity, is      

      not appreciated, Lymph nodes: no appreciated lymphadenopathy.                               

                                                                                                  

Vital Signs:                                                                                      

17:15  / 82; Pulse 86; Resp 22; Temp 98.1(TE); Pulse Ox 97% on R/A; Weight 113.4 kg;    ld1 

      Pain 6/10;                                                                                  

18:46  / 77; Pulse 95; Resp 17; Pulse Ox 98% ;                                          bp  

19:00 Pain 0/10;                                                                              ke1 

20:01  / 78; Pulse 94; Resp 19; Temp 98.5(O); Pulse Ox 96% on R/A; Pain 0/10;           ld1 

                                                                                                  

Kori Coma Score:                                                                               

18:38 Eye Response: spontaneous(4). Verbal Response: oriented(5). Motor Response: obeys       arlin 

      commands(6). Total: 15.                                                                     

                                                                                                  

MDM:                                                                                              

17:51 Patient medically screened.                                                             arlin 

18:38 Differential diagnosis: cluster headache, migraine, tension headache, non-specific abd  arlin 

      pain, urinary tract infection. Data reviewed: vital signs, nurses notes, lab test           

      result(s), radiologic studies. Data interpreted: Cardiac monitor: rate is 86 beats/min,     

      rhythm is regular. Counseling: I had a detailed discussion with the patient and/or          

      guardian regarding: the historical points, exam findings, and any diagnostic results        

      supporting the discharge/admit diagnosis, lab results, radiology results, the need for      

      outpatient follow up, for definitive care, a family practitioner, a pain management         

      specialist.                                                                                 

                                                                                                  

10/02                                                                                             

18:33 Order name: Urine Microscopic Only                                                      [facility] 

10/02                                                                                             

19:39 Order name: Urine Culture                                                               [facility] 

10/02                                                                                             

18:33 Order name: CT Traumagram (Head C Spine CAP wo con)                                     [facility] 

10/02                                                                                             

19:11 Order name: CT; Complete Time: 19:29                                                    [facility]

10/02                                                                                             

18:33 Order name: Urine Dipstick-Ancillary (obtain specimen); Complete Time: 20:48            [facility] 

10/02                                                                                             

18:33 Order name: Ortega Leg Bag; Complete Time: 19:58                                         arlin 

                                                                                                  

Administered Medications:                                                                         

18:45 Drug: Dilaudid 3 mg Route: IM; Site: right deltoid;                                     bp  

19:00 Follow up: Pain 0/10 Adult; Response: Pain is decreased                                 ke1 

18:45 Drug: Phenergan (promethazine) 25 mg Route: IM; Site: right deltoid;                    bp  

19:00 Follow up: Response: Nausea is decreased                                                ke1 

19:45 Drug: Rocephin (cefTRIAXone) 1 grams Route: IM; Site: right ventrogluteal;              ld1 

20:47 Follow up: Response: No adverse reaction                                                ke1 

                                                                                                  

                                                                                                  

Disposition Summary:                                                                              

10/02/22 19:32                                                                                    

Discharge Ordered                                                                                 

      Location: Home                                                                          arlin 

      Problem: new                                                                            arlin 

      Symptoms: have improved                                                                 arlin 

      Condition: Stable                                                                       arlin 

      Diagnosis                                                                                   

        - UTI/ Urinary tract infection, site not specified                                    arlin 

        - Headache -  SHUNT , UNCHANGED(10/02/22 19:33)                                     arlin 

      Followup:                                                                               arlin 

        - With: Private Physician                                                                  

        - When: 2 - 3 days                                                                         

        - Reason: Recheck today's complaints, Continuance of care, Re-evaluation by your           

      physician                                                                                   

      Followup:                                                                               arlin 

        - With:                                                                                    

        - When: 2 - 3 days                                                                         

        - Reason: Recheck today's complaints, Re-evaluation by your physician                      

      Followup:                                                                               arlin 

        - With:                                                                                    

        - When: 2 - 3 days                                                                         

        - Reason: Recheck today's complaints, Re-evaluation by your physician                      

      Discharge Instructions:                                                                     

        - Discharge Summary Sheet                                                             arlin 

        - Dysuria                                                                             arlin 

        - Urinary Tract Infection, Adult                                                      arlin 

        - Urinary Tract Infection, Adult, Easy-to-Read                                        arlin 

        - Brain Shunt Home Guide                                                              arlin 

        - Brain Shunt Placement, Care After                                                   arlin 

      Forms:                                                                                      

        - Medication Reconciliation Form                                                      arlin 

        - Thank You Letter                                                                    arlin 

        - Antibiotic Education                                                                arlin 

        - Prescription Opioid Use                                                             arlin 

      Prescriptions:                                                                              

        - cefpodoxime 200 mg Oral Tablet                                                           

            - take 2 tablets by ORAL route every 12 hours with food; 28 tablet; Refills: 0,   arlin 

      Product Selection Permitted                                                                 

Signatures:                                                                                       

Dispatcher [employer]                           EDMS                                                 

Luis Cruz MD MD cha Peltier, Brian RN                      RN   bp                                                   

Suly Jang RN                     RN   ld1                                                  

Karla Gutierrez RN   ke1                                                  

                                                                                                  

Corrections: (The following items were deleted from the chart)                                    

19:33 19:32 Headache arlin                                                                      arlin 

                                                                                                  

**************************************************************************************************

## 2022-11-25 ENCOUNTER — HOSPITAL ENCOUNTER (INPATIENT)
Dept: HOSPITAL 97 - ER | Age: 37
LOS: 3 days | Discharge: HOME | DRG: 699 | End: 2022-11-28
Attending: INTERNAL MEDICINE | Admitting: INTERNAL MEDICINE
Payer: COMMERCIAL

## 2022-11-25 VITALS — BODY MASS INDEX: 57.9 KG/M2

## 2022-11-25 DIAGNOSIS — E11.621: ICD-10-CM

## 2022-11-25 DIAGNOSIS — Z88.1: ICD-10-CM

## 2022-11-25 DIAGNOSIS — Z20.822: ICD-10-CM

## 2022-11-25 DIAGNOSIS — E66.9: ICD-10-CM

## 2022-11-25 DIAGNOSIS — B96.4: ICD-10-CM

## 2022-11-25 DIAGNOSIS — Z90.49: ICD-10-CM

## 2022-11-25 DIAGNOSIS — N39.0: ICD-10-CM

## 2022-11-25 DIAGNOSIS — Z88.5: ICD-10-CM

## 2022-11-25 DIAGNOSIS — L89.512: ICD-10-CM

## 2022-11-25 DIAGNOSIS — F17.200: ICD-10-CM

## 2022-11-25 DIAGNOSIS — G89.4: ICD-10-CM

## 2022-11-25 DIAGNOSIS — Z88.0: ICD-10-CM

## 2022-11-25 DIAGNOSIS — J45.909: ICD-10-CM

## 2022-11-25 DIAGNOSIS — Q05.7: ICD-10-CM

## 2022-11-25 DIAGNOSIS — Z79.4: ICD-10-CM

## 2022-11-25 DIAGNOSIS — Z79.899: ICD-10-CM

## 2022-11-25 DIAGNOSIS — K21.9: ICD-10-CM

## 2022-11-25 DIAGNOSIS — Z99.3: ICD-10-CM

## 2022-11-25 DIAGNOSIS — T83.510A: Primary | ICD-10-CM

## 2022-11-25 DIAGNOSIS — I10: ICD-10-CM

## 2022-11-25 DIAGNOSIS — Z88.8: ICD-10-CM

## 2022-11-25 LAB
ALBUMIN SERPL BCP-MCNC: 3.7 G/DL (ref 3.4–5)
ALP SERPL-CCNC: 127 U/L (ref 45–117)
ALT SERPL W P-5'-P-CCNC: 41 U/L (ref 12–78)
AST SERPL W P-5'-P-CCNC: 21 U/L (ref 15–37)
BUN BLD-MCNC: 15 MG/DL (ref 7–18)
GLUCOSE SERPLBLD-MCNC: 118 MG/DL (ref 74–106)
HCT VFR BLD CALC: 52.7 % (ref 39.6–49)
LIPASE SERPL-CCNC: 113 U/L (ref 73–393)
LYMPHOCYTES # SPEC AUTO: 2.6 K/UL (ref 0.7–4.9)
MCV RBC: 84.8 FL (ref 80–100)
PMV BLD: 7.9 FL (ref 7.6–11.3)
POTASSIUM SERPL-SCNC: 4.8 MMOL/L (ref 3.5–5.1)
RBC # BLD: 6.21 M/UL (ref 4.33–5.43)

## 2022-11-25 PROCEDURE — 87040 BLOOD CULTURE FOR BACTERIA: CPT

## 2022-11-25 PROCEDURE — 83690 ASSAY OF LIPASE: CPT

## 2022-11-25 PROCEDURE — 80053 COMPREHEN METABOLIC PANEL: CPT

## 2022-11-25 PROCEDURE — 71045 X-RAY EXAM CHEST 1 VIEW: CPT

## 2022-11-25 PROCEDURE — 87088 URINE BACTERIA CULTURE: CPT

## 2022-11-25 PROCEDURE — 74176 CT ABD & PELVIS W/O CONTRAST: CPT

## 2022-11-25 PROCEDURE — 81015 MICROSCOPIC EXAM OF URINE: CPT

## 2022-11-25 PROCEDURE — 82947 ASSAY GLUCOSE BLOOD QUANT: CPT

## 2022-11-25 PROCEDURE — 87086 URINE CULTURE/COLONY COUNT: CPT

## 2022-11-25 PROCEDURE — 87811 SARS-COV-2 COVID19 W/OPTIC: CPT

## 2022-11-25 PROCEDURE — 87186 SC STD MICRODIL/AGAR DIL: CPT

## 2022-11-25 PROCEDURE — 83605 ASSAY OF LACTIC ACID: CPT

## 2022-11-25 PROCEDURE — 36415 COLL VENOUS BLD VENIPUNCTURE: CPT

## 2022-11-25 PROCEDURE — 85025 COMPLETE CBC W/AUTO DIFF WBC: CPT

## 2022-11-25 PROCEDURE — 99251: CPT

## 2022-11-25 PROCEDURE — 99285 EMERGENCY DEPT VISIT HI MDM: CPT

## 2022-11-25 PROCEDURE — 80048 BASIC METABOLIC PNL TOTAL CA: CPT

## 2022-11-25 PROCEDURE — 87077 CULTURE AEROBIC IDENTIFY: CPT

## 2022-11-25 RX ADMIN — Medication SCH ML: at 21:00

## 2022-11-25 RX ADMIN — SODIUM CHLORIDE SCH MLS: 0.9 INJECTION, SOLUTION INTRAVENOUS at 22:00

## 2022-11-25 NOTE — XMS REPORT
Continuity of Care Document

                          Created on:2022



Patient:LAMBERT REDDAJITH

Sex:Male

:1985

External Reference #:[id number]





Demographics







                          Address                   [address]



                                                    [place] [address]

 

                          Home Phone                [phone]

 

                          Work Phone                [phone]

 

                          Mobile Phone              [phone]

 

                          Email Address             [address]

 

                          Preferred Language        English

 

                          Marital Status            Unknown

 

                          Hindu Affiliation     Unknown

 

                          Race                      Unknown

 

                          Additional Race(s)        Unavailable



                                                    Black or 

 

                          Ethnic Group              Unknown









Author







                          Organization              [facility]

t

 

                          Address                   [address]



                                                    [place] [address]

 

                          Phone                     [phone]









Support







                Name            Relationship    Address         Phone

 

                SABRINA VERDIN              [address]           [phone]



                                                [address] [place] [id number] 

 

                SABRINA VERDIN  [place]              [address] [phone]



                                                [place] [id number] 

 

                one else per patient, No Unavailable     Unavailable     Unavail

able

 

                Redd Verdin  Unavailable     [address] [phone]



                                                [place] [address] 

 

                Cecelia Verdin Unavailable     Unavailable     [phone]

 

                Lambert PressleyReddajith Unavailable     [address]

 [phone]



                                                [place] [address] 

 

                LambertSonnySabrina  Mother          [address] [phone]

[phone]



                                                [place] [id number] 

 

                PATIENT, NO ONE ELSE PER Unavailable     Unavailable     Unavail

able

 

                PHYILLIS        Grandparent     Unavailable     Unavailable

 

                SABRINA VERDIN M               [address]    Unavailabl

e



                                                [place] [id number] 

 

                Gabrieladalila VerdinSabrina Mother          [address]    [phone]



                                                [place] [id number] 

 

                YAZ, SMITA  Grandparent     Unavailable     [phone]

 

                REDD VERDIN  F               [address]    Unavailable



                                                [place] [address] 

 

                O'GREG, SMITA LAURA Grandparent     [address]       Unavailable



                                                [place] [address] 

 

                Alura O'Greg, Smita Grandparent     [address]       [phone]



                                                [place] [id number] 

 

                Lambert Sr., Redd Father          [address]    [phone]

[phone]



                                                [place] [address] 









Care Team Providers







                    Name                Role                Phone

 

                    Sharpneil           Primary Care Physician [phone]

 

                    Domingo Mc       Attending Clinician Unavailable

 

                    SUREKHA HUYNH      Attending Clinician Unavailable

 

                    MARY ANNE RAMIREZ             Attending Clinician Unavailable

 

                    MARY ANNE RAMIREZ             Attending Clinician Unavailable

 

                    EDWARDO LOPEZ       Attending Clinician Unavailable

 

                    Maryellen NP, Neeta NAM Attending Clinician [phone]

 

                    Edwardo Lopez DO    Attending Clinician [phone]

 

                    ALMA VILLASEÑOR    Attending Clinician Unavailable

 

                    Kasi FNP, Alma PAN Attending Clinician [phone]

 

                    ASHLEY GARAY    Attending Clinician Unavailable

 

                    Ashley Garay MD Attending Clinician [phone]

 

                    Dulce Guzman LVN Attending Clinician [phone]

 

                    RACHEL BAILEY      Attending Clinician Unavailable

 

                    Yoni Bangura Attending Clinician [phone]

 

                    Rachel Bailey MD   Attending Clinician [phone]

 

                    VAMSHI, SAPPHIRE A Attending Clinician Unavailable

 

                    Yessica Helms RN Attending Clinician Unavailable

 

                    Jessi Topete MD  Attending Clinician [phone]

 

                    Ronel COTTO, Stephany ARAUJO Attending Clinician [phone]

 

                    SCOOBY SHARPE   Attending Clinician Unavailable

 

                    Thanh CLARKP, Yo B Attending Clinician [phone]

 

                    Jojo MEADOWS, Keenan Olmedo Attending Clinician [phone]

[phone]

 

                    Trish MEADOWS, An OH   Attending Clinician [phone]

 

                    Alan MEADOWS, Lzi G  Attending Clinician [phone]

 

                    Scooby Sharpe MD Attending Clinician [phone]

 

                    Sheridan Delarosa MD Attending Clinician [phone]

 

                    Shahid MEADOWS, Mac ARAUJO   Attending Clinician [phone]

 

                    Vamshi ALEGRE, Sapphire A Attending Clinician [phone]

 

                    KAYLYNN LU      Attending Clinician Unavailable

 

                    Ashly Greene S  Attending Clinician [phone]

 

                    Tabitha MEADOWS, Kaylynn   Attending Clinician [phone]

 

                    RADHA MEADOWS Attending Clinician Unavailable

 

                    CHRIS SOL     Attending Clinician Unavailable

 

                    Alondra Santos MD Attending Clinician [phone]

 

                    Chris Sol DO  Attending Clinician [phone]

 

                    RADHA FOFANA     Attending Clinician Unavailable

 

                    Radha Fofana MD  Attending Clinician [phone]

 

                    TREY MEADOWS    Attending Clinician Unavailable

 

                    Noe Phillips MD     Attending Clinician [phone]

 

                    Trey Meadows MD Attending Clinician [phone]

 

                    Josse Alonso Attending Clinician [phone]

 

                    Shelton Pradhan Attending Clinician [phone]

 

                    DEAN SEGOVIA    Attending Clinician Unavailable

 

                    Tobias Hernandez MD  Attending Clinician [phone]

 

                    Efrain Myles MD Attending Clinician [phone]

 

                    Martha Powell MD  Attending Clinician [phone]

 

                    Dean Segovia MD Attending Clinician Unavailable

 

                    RAND DONG      Attending Clinician Unavailable

 

                    Rand Dong MD   Attending Clinician [phone]

 

                    TREY FAIRCHILD Attending Clinician Unavailable

 

                    Trey Fairchild Attending Clinician [phone]

 

                    Bernardo De Souza Attending Clinician [phone]

 

                    ALLISON ROMEO     Attending Clinician Unavailable

 

                    ALLISON Carver Attending Clinician [phone]

 

                    NEETA MAIRA    Attending Clinician Unavailable

 

                    Only, Ang Db Test   Attending Clinician Unavailable

 

                    Nicole Llanes MD     Attending Clinician [phone]

 

                    NICOLE LLANES        Attending Clinician Unavailable

 

                    Acacia Contreras Attending Clinician [phone]

 

                    ACACIA VILLA Attending Clinician Unavailable

 

                    MARTY RIVERS    Attending Clinician Unavailable

 

                    Deisy Linares   Attending Clinician [phone]

 

                    Deedee Reinoso   Attending Clinician [phone]

 

                    RAMU TORRES Attending Clinician Unavailable

 

                    JOHN, EDWARDO       Admitting Clinician Unavailable

 

                    Edwardo Lopez DO    Admitting Clinician [phone]

 

                    ASHLEY GARAY    Admitting Clinician Unavailable

 

                    RACHEL BAILEY      Admitting Clinician Unavailable

 

                    Rachel Bailey MD   Admitting Clinician [phone]

 

                    AN CHAMBERS      Admitting Clinician Unavailable

 

                    An Chambers MD   Admitting Clinician [phone]

 

                    KAYLYNN LU      Admitting Clinician Unavailable

 

                    Kaylynn Lu MD   Admitting Clinician [phone]

 

                    CHRIS SOL     Admitting Clinician Unavailable

 

                    TREY MEADOWS    Admitting Clinician Unavailable

 

                    Trey Meadows MD Admitting Clinician [phone]

 

                    TOBIAS HERNANDEZ     Admitting Clinician Unavailable

 

                    Tobias Hernandez MD  Admitting Clinician [phone]

 

                    RAND DONG      Admitting Clinician Unavailable

 

                    Rand Dong MD   Admitting Clinician [phone]

 

                    DEISY SUTTON Admitting Clinician Unavailable

 

                    Deisy Sutton Admitting Clinician [phone]

 

                    Bernardo De Souza Admitting Clinician [phone]

 

                    ALLISON ROMEO     Admitting Clinician Unavailable

 

                    ALMA VILLASEÑOR    Admitting Clinician Unavailable

 

                    NEETA MARIA    Admitting Clinician Unavailable

 

                    NURIA PEREIRA        Admitting Clinician Unavailable

 

                    Peter De Leon Admitting Clinician [phone]

 

                    Deedee Reinoso   Admitting Clinician [phone]

 

                    RAMU TORRES Admitting Clinician Unavailable









Payers







           Payer Name Policy Type Policy Number Effective Date Expiration Date DARWIN barnes

 

           [facility] STAR            [id number]  2021            



           PLUS                             00:00:00              

 

           [facility] ST[facility]            [id number]  2022            



                                            00:00:00              

 

           [facility]         [id number]                        Common



           (Medicaid)                                             [facility]         [id number]                        Common



           (Medicaid)                                             [facility]         [id number]                        Common



           (Medicaid)                                             [facility]         [id number]                        Common



           (Medicaid)                                             [facility]

 

           [facility]         [id number]                        Common



           (Medicaid)                                             [facility]







Problems







       Condition Condition Condition Status Onset  Resolution Last   Treating Co

mments 

Source



       Name   Details Category        Date   Date   Treatment Clinician        



                                                 Date                 

 

       Lactic Lactic Disease Active 2022                             [facility]



       acidosis acidosis               1-06                               ity of



                                   00:00:                             [place]



                                   00                                 Medical



                                                                      Branch

 

       Nausea and Nausea and Disease Active 2022                             U

nivers



       vomiting, vomiting,               0-21                               ity 

of



       unspecifie unspecifie               00:00:                             [facility]

[place]



       d vomiting d vomiting               00                                 Me

dical



       type   type                                                    Branch

 

       Chest pain Chest pain Disease Active                              U

nivers



                                   8-04                               ity of



                                   00:00:                             [place]



                                   00                                 Medical



                                                                      Branch

 

       Foot ulcer Foot ulcer Disease Active                       Overview

: Univers



       with fat with fat                                       Formattin ity

 of



       layer  layer                00:00:                      g of this [place]



       exposed, exposed,               00                          note   Medica

l



       left   left                                             might be Branch



                                                               different 



                                                               from the 



                                                               original. 



                                                               Added  



                                                               automatic 



                                                               ally from 



                                                               request 



                                                               for    



                                                               surgery 



                                                               [id number] 

 

       Right foot Right foot Disease Active                       Overview

: Univers



       ulcer, ulcer,                                       Formattin ity of



       with fat with fat               00:00:                      g of this Eric

as



       layer  layer                00                          note   Medical



       exposed exposed                                           might be Branch



                                                               different 



                                                               from the 



                                                               original. 



                                                               Added  



                                                               automatic 



                                                               ally from 



                                                               request 



                                                               for    



                                                               surgery 



                                                               [id number] 

 

       Diabetic Diabetic Disease Active                              Unive

rs



       ketoacidos ketoacidos               7-                               it

y of



       is without is without               00:00:                             Te

[place]



       coma   coma                 00                                 Medical



       associated associated                                                  Br

anch



       with type with type                                                  



       1 diabetes 1 diabetes                                                  



       mellitus mellitus                                                  

 

       Abdominal Abdominal Disease Active                              Uni

vers



       pain,  pain,                6-24                               ity of



       unspecifie unspecifie               00:00:                             Te

[place]



       d      d                    00                                 Medical



       abdominal abdominal                                                  Bran

ch



       location location                                                  

 

       Hyperglyce Hyperglyce Disease Active                              U

nivers



       jarvis    jarvis                  6-07                               ity of



                                   00:00:                             [place]



                                   00                                 Medical



                                                                      Branch

 

       Decubitus Decubitus Disease Active                              Uni

vers



       ulcer of ulcer of               6-05                               ity of



       heel,  heel,                00:00:                             [place]



       left,  left,                00                                 Medical



       unstageabl unstageabl                                                  Br

anch



       e      e                                                       

 

       Diabetic Diabetic Disease Active                              Unive

rs



       ketoacidos ketoacidos               6-05                               it

y of



       is without is without               00:00:                             Te

[place]



       coma   coma                 00                                 Medical



       associated associated                                                  Br

anch



       with type with type                                                  



       2 diabetes 2 diabetes                                                  



       mellitus mellitus                                                  

 

       Decubitus Decubitus Disease Active                              Uni

vers



       ulcer of ulcer of               05                               ity of



       heel,  heel,                00:00:                             [place]



       left,  left,                00                                 Medical



       unstageabl unstageabl                                                  Br

anch



       e      e                                                       

 

       Pyuria Pyuria Disease Active                              Univers



                                   6-05                               ity of



                                   00:00:                             [place]



                                   00                                 [facility]

 

       Chronic Chronic Disease Active                              Univers



       suprapubic suprapubic               6                               it

y of



       catheter catheter               00:00:                             [place]



                                                                    Medical



                                                                      Branch

 

       Uncontroll Uncontroll Disease Active                              U

jeferson



       ed     ed                   6                               ity of



       diabetes diabetes               00:00:                             [place]



       mellitus mellitus               00                                 Medica

l



       with   with                                                    Branch



       complicati complicati                                                  



       ons    ons                                                     

 

       Spina  Spina  Disease Recurre                              Univers



       bifida bifida        nce                                   ity of



                                   00:00:                             [place]



                                                                    Medical



                                                                      Branch

 

       Wound  Wound  Disease Active                              Univers



       infection infection               5-                               ity 

of



                                   00:00:                             [place]



                                                                    Medical



                                                                      Branch

 

       Chills Chills Disease Active                              Univers



                                   5-                               ity of



                                   00:00:                             [place]



                                                                    [facility]

 

       POSSIBLE  POSSIBLE Diagnosis Active 2022             

  [facility]



        SHUNT  SHUNT                         21:48:00               l



       MALFUNCTIO MALFUNCTIO               00:00:                             Edson milan



       N      N Active               00                                 



              2022                                                  



               [facility]                                                  

 

        SHUNT   SHUNT Diagnosis Active 2022             

  [facility]



       MALFUNCTIO MALFUNCTIO               3-25          14:12:00               

l



       N      N Active               00:00:                             Jose



              2022                                 



               [facility]                                                  

 

        SHUNT   SHUNT Diagnosis Active 2022             

  [facility]



       MALFUCTION MALFUCTION               3-24          23:59:00               

l



              Active               00:00:                             Jose



              2022                                 



              [facility]                                                  

 

       Complicate Complicate Disease Active                              U

niv[place]ale



       d urinary d urinary               1-20                               ity 

of



       tract  tract                00:00:                             [place]



       infection infection               00                                 [facility]

 

       Hyperglyce Hyperglyce Disease Active                              C

[facility]



       jarvis    jarvis                  1-07                               [facility]



       without without               00:00:                             Medical



       ketosis ketosis               00                                 Center

 

       HEADACHE  HEADACHE Diagnosis Active 2018             

  [facility]



              Active                         22:05:00               l



              2018               00:00:                             Miguel malloy



              [address]                                                  

 

       SHUNT   SHUNT Diagnosis Active 2018               Mem

oria



       MALFUNCTIO MALFUNCTIO                         20:00:00               

l



       N      N Active               00:00:                             Jose



              2018               00                                 



              [facility]                                                  

 

       ACUTE   ACUTE Diagnosis Active 2018               Mem

oria



       HEADACHE HEADACHE                         09:20:00               l



              Active               00:00:                             Jose



              2018               00                                 



               [facility]                                                  

 

       Spina  Spina  Disease Active                              [facility]



       bifida bifida               6-12                               [facility]



                                   00:00[facility]                             [facility]



                                   00                                 [place]

 

       Pyelonephr Pyelonephr Disease Active                              [facility]

[facility]



       itis   itis                 6-11                               [facility]



                                   00:00:                             [facility]



                                   00                                 [place]

 

       Morbid Morbid Disease Active                              [facility]



       obesity obesity               1-04                               ity of



       with body with body               00:00:                             [place]

s



       mass index mass index               00                                 Me

dical



       of     of                                                      Branch



       40.0-49.9 40.0-49.9                                                  

 

       Spina   Spina Problem                      2019               [facility]

ia



       bifida, bifida,                             14:14:02               l



       unspecifie unspecifie                                                  He

rmann



       d      d                                                       



              2019                                                  



              [facility]                                                  

 

       Nausea  Nausea Problem                      2019               [facility]

rajeev



       with   with                               14:14:02               l



       vomiting, vomiting,                                                  Herm

jorge



       unspecifie unspecifie                                                  



       d      d                                                       



              2019                                                  



              [facility]                                                  

 

       Diplopia  Diplopia Problem                      2019               

[facility]



              2019                             14:14:02               l



               [facility]                                                  

 

       Cerebral  Cerebral Problem                      2019               

[facility]



       palsy, palsy,                             14:14:02               l



       unspecifie unspecifie                                                  He

rmann



       d      d                                                       



              2019                                                  



              [facility]                                                  

 

       Acquired  Acquired Problem                      2019               

[facility]



       absence of absence of                             14:14:02               

l



       other  other                                                   [place]



       specified specified                                                  



       parts of parts of                                                  



       digestive digestive                                                  



       tract  tract                                                   



              2019                                                  



              [facility]                                                  

 

       Nicotine  Nicotine Problem                      2019               

[facility]



       dependence dependence                             14:14:02               

l



       ,      ,                                                       [place]



       cigarettes cigarettes                                                  



       ,      ,                                                       



       uncomplica uncomplica                                                  



       huma    huma                                                     



              2019                                                  



              [facility]                                                  

 

       Presence  Presence Problem                      2019               

[facility]



       of     of                                 14:14:02               l



       cerebrospi cerebrospi                                                  He

rmann



       nal fluid nal fluid                                                  



       drainage drainage                                                  



       device device                                                  



              2019                                                  



              [facility]                                                  

 

       Allergy  Allergy Problem                      2019               Me

moria



       status to status to                             14:14:02               l



       other  other                                                   Jose



       antibiotic antibiotic                                                  



       agents agents                                                  



       status status                                                  



              2019                                                  



               [facility]                                                  

 

       Allergy  Allergy Problem                      2019               Me

moria



       status to status to                             14:14:02               l



       other  other                                                   Jose



       drugs, drugs,                                                  



       medicament medicament                                                  



       s and  s and                                                   



       biological biological                                                  



       substances substances                                                  



       status status                                                  



              2019                                                  



              [facility]                                                  

 

       Allergy   Allergy Problem                      2019               M

emoria



       status to status to                             14:14:02               l



       narcotic narcotic                                                  Miguel

n



       agent  agent                                                   



       status status                                                  



              2019                                                  



               [facility]                                                  

 

       Asthma  Asthma Problem Resolve               2022-04-15               Mem

oria



       (disorder) (disorder)        d                    23:48:47               

l



              Resolved                                                  [place]



              Problem                                                  



              04/15/2022                                                  



              [facility]                                                  

 

       Bronchitis  Bronchiti Problem Resolve               2022-04-15           

    [facility]



       (disorder) s             d                    23:48:47               l



              (disorder)                                                  Miguel

n



              Resolved                                                  



              Problem                                                  



              04/15/2022                                                  



              [facility]                                                  

 

       Cerebral  Cerebral Problem Resolve               2022-04-15              

 [facility]



       palsy  palsy         d                    23:48:47               l



       (disorder) (disorder)                                                  He

rmann



              Resolved                                                  



              Problem                                                  



              04/15/2022                                                  



              [facility]                                                  

 

       Hydrocepha  Hydroceph Problem Resolve               2022-04-15           

    [facility]



       ozzy    alus          d                    23:48:47               l



       (disorder) (disorder)                                                  He

rmann



              Resolved                                                  



              Problem                                                  



              04/15/2022                                                  



               [facility]                                                  

 

       Osteomyeli  Osteomyel Problem Resolve               2022-04-15           

    [facility]



       tis    itis          d                    23:48:47               l



       (disorder) (disorder)                                                  He

rmann



              Resolved                                                  



              Problem                                                  



              04/15/2022                                                  



              [facility]                                                  

 

       Acute pain  Acute Problem Active               2022-04-15               M

emoria



       (finding) pain                               23:48:47               l



              (finding)                                                  [place]



              Active                                                  



              Problem                                                  



              04/15/2022                                                  



              [facility]                                                  

 

       Morbid  Morbid Problem Active               2022-04-15               [facility]

rajeev



       obesity obesity                             23:48:47               l



       (disorder) (disorder)                                                  He

rmann



              Active                                                  



              Problem                                                  



              04/15/2022                                                  



              [facility]                                                  

 

       Providenci  Providenc Problem Active               2022-04-15            

   [facility]



       a      ia                                 23:48:47               l



       (organism) (organism)                                                  He

rmann



              Active                                                  



              Problem                                                  



              04/15/2022                                                  



              Problem                                                  



              added by                                                  



              Discern                                                  



              Expert. [facility]                                                  

 

       Lumbar        Problem Active                                    Common



       spina                                                          [facility]



       bifida                                                         - [facility]



       with                                                           St



       [facility]

 

       Dependence        Problem Active                                    Commo

n



       on                                                             [facility]



       wheelchair                                                         - [facility]

 

       Paraplegia        Problem Active                                    Commo

n



                                                                      [facility]



                                                                      [facility]

 

       Constipati        Problem Active                                    Commo

n



       on                                                             [facility]



                                                                      [facility]

 

       Incontinen        Problem Active                                    Commo

n



       ce of                                                          [facility]



       urine                                                          [facility]

 

       Nausea        Problem Active                                    Common



                                                                      [facility]

 

       Mixed         Problem Active                                    Common



       anxiety                                                         [facility]



       and                                                            - [facility]



       depressive                                                         [facility]

 

       [id number]        Problem Active                                    Common



                                                                      [facility]



                                                                      [facility]

 

       Bowel         Problem Active                                    Common



       incontinen                                                         [facility]



       [facility]

 

       [id number]        Problem Active                                    Common



                                                                      [facility]

 

       [id number]        Problem Active                                    Common



                                                                      [facility]

 

       [id number]        Problem Active                                    Common



                                                                      [facility]

 

       [id number]        Problem Active                                    Commo

n



       [id number]                                                         [facility]

 

       Foot ulcer        Problem Active                                    Commo

n



                                                                      [facility]



                                                                      [facility]

 

       Myelocele        Problem Active                                    Common



       with                                                           [facility]



       hydrocepha                                                         [facility]

 

       [id number]        Problem Active                                    Common



                                                                      [facility]

 

       [id number]        Problem Active                                    Common



                                                                      [facility]

 

       Nicotine        Problem Active                                    Common



       dependence                                                         [facility]

 

       [id number]        Problem Active                                    Common



                                                                      [facility]

 

       [id number]        Problem Active                                    Common



                                                                      [facility]

 

       [id number]        Problem Active                                    Common



                                                                      [facility]

 

       [id number]        Problem Active                                    Common



                                                                      [facility]







History of Past Illness







       Condition Condition Condition Status Onset  Resolution Last   Treating Co

mments 

Source



       Name   Details Category        Date   Date   Treatment Clinician        



                                                 Date                 

 

       Headache  Headache Problem        2019           

    [facility]



              2018   14:14:02 14:14:02               l



              2019               06:00:                             Miguel malloy



               [address]                                                  







Allergies, Adverse Reactions, Alerts







       Allergy Allergy Status Severity Reaction(s) Onset  Inactive Treating Comm

ents 

Source



       Name   Type                        Date   Date   Clinician        

 

       Amoxicil Propensi Active        Hives                        [facility]

s



       bryn    ty to                                               ity of



              adverse                      00:00:                      [place]



              reaction                      00                          Medical



              s                                                       Branch

 

       AMOXICIL DRUG   Active        Hives  2018-0                      [facility]



       BRYN    INGREDI                      7-27                        ity of



                                          00:00:                      [place]



                                          00                          Medical



                                                                      [facility]anch

 

       Metoclop Propensi Active        Nausea 2017-                      [facility]

s



       ramide ty to                and/or 2-20                        ity of



       Hcl    adverse               Vomiting 00:00:                      [place]



              reaction                      00                          Medical



              s                                                       Branch

 

       METOCLOP DRUG   Active        N/V    2017-1                      [facility]



       RAMIDE INGREDI                      2-20                        ity of



       HCL                                00:00:                      [place]



                                          00                          Medical



                                                                      Branch

 

       SULFAMET Allergy Active High   Hives  2017-0                      [facility]



       HOXAZOLE                             6-11                        [facility]



       -[facility]                             00:00:                      Medical



       OPRIM                              00                          Center

 

       LEVOFLOX Allergy Active High   Hives  2017-0                      [facility]



       ACIN                               6-11                        [facility]



                                          00[facility]00[facility]                      [facility]



                                          00                          Center

 

       MORPHINE Allergy Active High   Hives  2017-0                      [facility]



                                          6-11                        [facility]



                                          00[facility]                      [facility]



                                          00                          Center

 

       KETOROLA Allergy Active High   Rash   2017-0                      [facility]



       C                                  6-11                        [facility]



                                          00:00[facility]                      [facility]



                                          00                          Center

 

       VANCOMYC Allergy Active High   Rash   2017-0                      [facility]



       IN                                 6-11                        [facility]



       ANALOGUE                             00:00:                      Medical



       S                                  00                          Center

 

       SESAME Allergy Active        Hives  2017-0                      [facility]



       SEED                               6-11                        [facility]



                                          00:00:                      [facility]



                                          00                          Center

 

       ONDANSET Allergy Active        N\T\V  2017-0                      [facility]



       EVIN HCL                             6-11                        [facility]



       (PF)                               00:00:                      Medical



                                          00                          Center

 

       Sulfamet Propensi Active        Hives  2017-0                      [facility]



       hoxazole ty to                       6-11                        [facility]



       -Trimeth adverse                      00:00:                      Medical



       oprim  reaction                      00                          Center



              s                                                       

 

       Levoflox Propensi Active        Hives  2017-0                      [facility]



       acin   ty to                       6-11                        [facility]



              adverse                      00:00:                      Medical



              reaction                      00                          Center



              s                                                       

 

       Morphine Propensi Active        Hives  2017-0                      [facility]



              ty to                       6-11                        [facility]



              adverse                      00:00:                      Medical



              reaction                      00                          Center



              s                                                       

 

       Sesame Propensi Active        Hives  2017-0                      [facility]



       Seed   ty to                       6-11                        [facility]



              adverse                      00:00:                      Medical



              reaction                      00                          Center



              s                                                       

 

       Ketorola Propensi Active        Rash   2017-0                      [facility]



       c      ty to                       6-11                        [facility]



              adverse                      00:00:                      Medical



              reaction                      00                          Center



              s                                                       

 

       Vancomyc Propensi Active        Rash   2017-0                      [facility]



       in     ty to                       6-11                        [facility]



       [employer] adverse                      00:00:                      Medical



       s      reaction                      00                          Center



              s                                                       

 

       Ondanset Propensi Active        Nausea And 2017-0                      [facility]

[facility]



       evin Hcl ty to                Vomiting 6-11                        [facility]



       (Pf)   adverse                      00:00:                      Medical



              reaction                      00                          Center



              s                                                       

 

       Sulfa  Propensi Active        Other - See                       Uni

vers



       (Sulfona ty to                comments 1-                        ity of



       mide   adverse                      00:00:                      [place]



       Antibiot reaction                      00                          Medica

l



       ics)   s                                                       Branch

 

       Sulfamet Propensi Active        Other - See                       U

nivers



       hoprim ty to                comments                         ity of



       Ds     adverse                      00:00:                      [place]



              reaction                                                Medical



              s                                                       Branch

 

       Vancomyc Propensi Active        Other - See                       U

nivers



       in     ty to                comments                         ity of



              adverse                      00:00:                      [place]



              reaction                      00                          Medical



              s                                                       Branch

 

       Sulfa  Propensi Active        Other - See                       Uni

vers



       (Sulfona ty to                comments                         ity of



       mide   adverse                      00:00:                      [place]



       Antibiot reaction                      00                          Medica

l



       ics)   s                                                       Branch

 

       SULFA  Drug   Active        Other-Cmnt                       Univer

s



       (SULFONA Class                       -                        ity of



       MIDE                               00:00:                      [place]



       ANTIBIOT                             00                          Medical



       ICS)                                                           Branch

 

       SULFAMET DRUG   Active        Other-Cmnt                       Univ

ers



       HOPRIM                             1                        ity of



       DS                                 00:00:                      [place]



                                          00                          Medical



                                                                      Branch

 

       VANCOMYC DRUG   Active        Other-Cmnt                       Univ

ers



       IN     INGREDI                      1-                        ity of



                                          00:00:                      [place]



                                          00                          Medical



                                                                      Branch

 

       Sulfamet Propensi Active        Rash                         Univer

s



       hoxazole ty to                       7-19                        ity of



              adverse                      00:00:                      [place]



              reaction                      00                          Medical



              s                                                       Branch

 

       Ondanset Propensi Active        Nausea                       Univer

s



       evin Hcl ty to                and/or                         ity of



       (Pf)   adverse               Vomiting 00:00:                      [place]



              reaction                      00                          Medical



              s                                                       Branch

 

       SULFAMET DRUG   Active        Rash                         Univers



       HOXAZOLE INGREDI                      7-19                        ity of



                                          00:00:                      [place]



                                          00                          Medical



                                                                      Branch

 

       ONDANSET DRUG   Active        N/V                          Univers



       EVIN HCL                             7-19                        ity of



       (PF)                               00:00:                      [place]



                                          00                          Medical



                                                                      Branch

 

       Levoflox Propensi Active        Hives                        Univer

s



       acin   ty to                       5-23                        ity of



              adverse                      00:00:                      [place]



              reaction                      00                          Medical



              s                                                       Branch

 

       Morphine Propensi Active        Hives                        Univer

s



              ty to                       5-23                        ity of



              adverse                      00:00:                      [place]



              reaction                      00                          Medical



              s                                                       Branch

 

       Ketorola Propensi Active        Nausea                       Univer

s



       c      ty to                and/or                         ity of



       Trometha adverse               Vomiting 00:00:                      [place]



       mine   reaction                      00                          Medical



              s                                                       Branch

 

       LEVOFLOX DRUG   Active        Hives                        Univers



       ACIN   INGREDI                                              ity of



                                          00:00:                      [place]



                                          00                          Medical



                                                                      Branch

 

       MORPHINE DRUG   Active        Hives                        Univers



              INGREDI                                              ity of



                                          00:00:                      [place]



                                          00                          Medical



                                                                      Branch

 

       KETOROLA DRUG   Active        N/V    -                      Univers



       C      INGREDI                                              ity of



       TROMETHA                             00:00:                      [place]



       MINE                               00                          Medical



                                                                      Branch

 

       morphine Drug   Active                                           Common



              allergy                                                  [facility]

 

       ondanset Drug   Active                                           Common



       evin    allergy                                                  [facility]

 

       [address]  Drug   Active                                           Common



              allergy                                                  [facility]

 

       amoxicil amoxicil Active                                           [facility]

a



       bryn    bryn                                                     l



                                                                      [place]

 

       morphine morphine Active                                           [facility]

a



                                                                      l



                                                                      [place]

 

       Toradol Toradol Active                                           [facility]



                                                                      l



                                                                      [place]

 

       Minocin Minocin Active                                           [facility]



                                                                      l



                                                                      [facility]

 

       Zofran Zofran Active                                           [facility]



                                                                      l



                                                                      [place]

 

       Levaquin Levaquin Active                                           [facility]

a



                                                                      l



                                                                      [facility]

 

       Bactrim Bactrim Active                                           [facility]



                                                                      l



                                                                      [facility]

 

       Reglan Reglan Active                                           [facility]



                                                                      l



                                                                      [facility]







Family History







           Family Member Diagnosis  Comments   Start Date Stop Date  Source

 

           Natural father Diabetes                                    [facility]

 

           Natural mother No Significant                                  Univer

sity of [facility] Problems                                  Medical 

Branch







Social History







           Social Habit Start Date Stop Date  Quantity   Comments   Source

 

           History [facility] o

f



           Alcohol Frequency                                             [facility]

edical



                                                                  Branch

 

           History [facility] o

f



           Alcohol Std Drinks                                             [facility]



                                                                  Branch

 

           History [facility] o

f



           Alcohol Binge                                             [place] [facility]

al



                                                                  Branch

 

           History of tobacco                       Cigarette Smoker            

[facility]



           [facility]

 

           Exposure to 2022-10-27 2022 Not sure              [facility]



           SARS-CoV-2 (event) 00:00:00   19:56:00                         [facility]

 

           Alcohol intake 2022 Current drinker            Unive

rsity of



                      00:00:00   00:00:00   of alcohol            [facility]



                                            (finding)             Branch

 

           Education  2022 21                    [facility]



                      00:00:00   00:00:00                         [facility]

 

           Tobacco use and 2022-10-21 2022-10-21 Smokeless             Universit

y of



           exposure   00:00:00   00:00:00   tobacco non-user            [place] [facility]

dical



                                                                  [place]

 

           Alcohol Comment 2022 once a year            Universi

ty of



                      00:00:00   00:00:00                         [facility]

 

           Social History 2022                       [facility]

ermann



                      05:57:00   05:57:00                         

 

           Cigarettes smoked 2017                       FRANCINE Dubois



           current (pack per 00:00:00   00:00:00                         Medical

 Center



           day) - Reported                                             

 

           Sex Assigned At 1985                       FRANCINE Rivass



           [facility]      [facility]   [facility]00                         [facility]









                Smoking Status  Start Date      Stop Date       Source

 

                Social History  2018 11:26:10                 [facility]

[facility]







Medications







       Ordered Filled Start  Stop   Current Ordering Indication Dosage Frequency

 Signature

                    Comments            Components          Source



     Medication Medication Date Date Medication? Clinician                (SIG) 

          



     Name Name                                                   

 

     Nitrofurant      2022- Yes            100mg      100 mg,           U

nivers



     oin&Nit.      13                          Oral, BID,           ity 

of



     Macrocryst      02:00: 01:59                          10 doses,           T

exas



     (MACROBID)      00   :00                           First dose           Med

ical



     100 mg                                         on Mon           Branch



     capsule 100                                         22 at           



     mg                                           2000, Last           



                                                  dose on           



                                                  Sat            



                                                  22           



                                                  at 0800,           



                                                  Routine<br           



                                                  >Reason           



                                                  for            



                                                  Anti-Infec           



                                                  tive:           



                                                  Empiric           



                                                  Therapy           



                                                  for            



                                                  Suspected           



                                                  Infection<           



                                                  br>Empiric           



                                                  Therapy           



                                                  Site:           



                                                  Urine<br>D           



                                                  uration of           



                                                  therapy:           



                                                  72 hours           

 

     ceFEPIme      2022- No             1000mg      1,000 mg,           U

nivers



     (MAXIPIME)                                IV             ity of



     1,000 mg in      21:15: 21:34                          [place], [place]



     NaCl 0.9%      00   :48                           ONCE, 1           Medical



     (NS) 50 mL                                         dose, On           [facility]



     MINI-BAG                                         22 at           



                                                  1515,           



                                                  Administer           



                                                  over 30           



                                                  Minutes,           



                                                  50             



                                                  mL<br>Reas           



                                                  on for           



                                                  Anti-Infec           



                                                  tive:           



                                                  Documented           



                                                  Infection<           



                                                  br>Documen           



                                                  huma            



                                                  Infection           



                                                  Site:           



                                                  Urine<br&g           



                                                  t;Duration           



                                                  of             



                                                  Therapy: 7           



                                                  days           

 

     HYDROmorpho      2022      Yes            4mg       Take 4 mg           U

nivers



     ne 4 mg      -07                               by mouth 3           ity of



     tablet      17:55:                               (three)           [place]



               40                                 times           Medical



                                                  daily as           Branch



                                                  needed.           

 

     enoxaparin      2022      Yes            40mg      40 mg,           Unive

rs



     (LOVENOX)                                     Subcutaneo           ity 

of



     injection      15:00:                               us, DAILY,           [place]

[place]



     40 mg      00                                 First dose           Medical



                                                  on Mon           [place]



                                                  22 at           



                                                  0900,           



                                                  Until           



                                                  Discontinu           



                                                  ed,            



                                                  Routine           

 

     Sliding      2022      Yes                      Subcutaneo           Univ

ers



     Scale      -07                               us, TID           ity of



     Insulin -      03:00:                               MEALS+HS,           Eric

as



     Lispro      00                                 First dose           Medical



     (HumaLOG) +                                         on [facility]



     Fsbg                                         22 at           



     Testing                                         2100,           



                                                  Until           



                                                  Discontinu           



                                                  ed,            



                                                  Routine           

 

     FENTanyl PF      2022- No             75ug      75 mcg,           Un

yoselyn



     (SUBLIMAZE                                Slow IV           ity o

f



     (PF))      01:15: 00:23                          Push,           [place]



     injection      00   :00                           ONCE, 1           Medical



     75 mcg                                         dose, On           [facility]            



                                                  22 at           



                                                  1915,           



                                                  Routine           

 

     dextrose      2022      Yes            250mL      250 mL, IV           Un

yoselyn



     10% (D10W)                                     Infusion,           ity 

of



     bolus      01:12:                               PRN - SEE           [place]



     infusion      23                                 INSTRUCTIO           Medic

al



     250 mL                                         NS,            Branch



                                                  Administer           



                                                  over 60           



                                                  Minutes,           



                                                  Other, If           



                                                  blood           



                                                  glucose is           



                                                  &amp;lt;           



                                                  or = 70           



                                                  mg/dL and           



                                                  patient is           



                                                  unable to           



                                                  swallow or           



                                                  has mental           



                                                  status           



                                                  changes,           



                                                  Starting           



                                                  on Sun           



                                                  22 at           



                                                  1912<br>If           



                                                  blood           



                                                  glucose is           



                                                  < or = 70           



                                                  mg/dL and           



                                                  patient is           



                                                  unable to           



                                                  swallow or           



                                                  has mental           



                                                  status           



                                                  changes           



                                                  (Give           



                                                  glucagon           



                                                  order if           



                                                  patient           



                                                  needs           



                                                  fluid           



                                                  restrictio           



                                                  n):            



                                                  &nbsp;IF           



                                                  IV access           



                                                  available:           



                                                  Dextrose           



                                                  10%.           



                                                  &nbsp;1.           



                                                  125 mL (?           



                                                  bag) of           



                                                  D10W IV           



                                                  infusion -           



                                                  equivalent           



                                                  to 12.5 g           



                                                  dextrose           



                                                  &nbsp;2.           



                                                  Blood           



                                                  glucose -           



                                                  draw blood           



                                                  glucose 15           



                                                  minutes           



                                                  after D10W           



                                                  Administra           



                                                  tion.           



                                                  &amp;nbsp;           



                                                  3. If           



                                                  blood           



                                                  glucose is           



                                                  < 80           



                                                  mg/dL,           



                                                  repeat.<br           



                                                  >              

 

     glucagon      2022      Yes            1mg       1 mg,           [facility]



     (GLUCAGEN                                     Intramuscu           ity 

of



     DIAGNOSTIC      01:12:                               lar, PRN,           [facility]

[place]



     KIT)      20                                 Starting           Medical



     injection 1                                         on [facility]



     mg                                           22 at           



                                                  1912,           



                                                  Until           



                                                  Discontinu           



                                                  ed, ASAP,           



                                                  Blood           



                                                  Glucose           



                                                  &amp;lt;           



                                                  or = 70           



                                                  mg/dL and           



                                                  patient is           



                                                  unable to           



                                                  swallow or           



                                                  has mental           



                                                  changes.           

 

     HYDROmorpho      2022      Yes            4mg       4 mg,           Unive

rs



     ne                                       Oral,           ity of



     (DILAUDID)      01:08:                               Q6HPRN,           [place]

[place]



     tablet 4 mg      42                                 Starting           Medi

[facility]



                                                  on Sun           Branch



                                                  22 at           



                                                  1908,           



                                                  Until           



                                                  Discontinu           



                                                  ed,            



                                                  Routine,           



                                                  Pain           



                                                  (scale           



                                                  7-10)           

 

     proMETHazin      2022      Yes            12.5mg      12.5 mg,           

[facility]



     e                                        Oral,           ity of



     (PHENERGAN)      01:06:                               Q4HPRN,           [facility]

as



     tablet 12.5      57                                 Starting           Medi

[facility]



     mg                                           on Sun           Branch



                                                  22 at           



                                                  1906,           



                                                  Until           



                                                  Discontinu           



                                                  ed,            



                                                  Routine,           



                                                  Nausea and           



                                                  Vomiting           



                                                  (N/V)           

 

     acetaminoph      2022      Yes            650mg      650 mg,           Un

[facility]



     en                                       Oral,           ity of



     (TYLENOL)      01:05:                               Q6HPRN,           [place]



     tablet 650      57                                 Starting           Medic

al



     mg                                           on Sun           Branch



                                                  22 at           



                                                  1905,           



                                                  Until           



                                                  Discontinu           



                                                  ed,            



                                                  Routine,           



                                                  Pain           



                                                  (scale           



                                                  1-3)           

 

     NaCl 0.9%      2022- No             1000mL      at 999           Uni

vers



     (NS) bolus      07                          mL/hr,           ity of



     infusion      00:30: 00:35                          1,000 mL,           Eric

as



     1,000 mL      00   :00                           IV             Medical



                                                  Infusion,           Branch



                                                  ONCE, 1           



                                                  dose, On           



                                                  Sun            



                                                  22 at           



                                                  1830, STAT           

 

     Nitrofurant      2022- Yes       [id number] 100mg      Take 1         

  [facility]



     oin&Nit.       11-15                          capsule by           ity 

of



     Macrocryst      00:00: 05:59                          mouth in           Te

[place]



     100 mg      00   :00                           the            Medical



     capsule                                         morning           Branch



                                                  and 1           



                                                  capsule in           



                                                  the            



                                                  evening.           



                                                  Do all           



                                                  this for 7           



                                                  days.           

 

     NaCl 0.9%      2022- No             1000mL      at 999           Uni

vers



     (NS) bolus      07                          mL/hr,           ity of



     infusion      23:45: 00:36                          1,000 mL,           Eric

as



     1,000 mL      00   :00                           IV             Medical



                                                  Infusion,           Branch



                                                  ONCE, 1           



                                                  dose, On           



                                                  Sun            



                                                  22 at           



                                                  1745, ASAP           

 

     FENTanyl PF      2022- No             75ug      75 mcg,           Un

[facility]



     (SUBLIMAZE                                Slow IV           ity o

f



     (PF))      23:45: 23:19                          Push,           [place]



     injection      00   :00                           ONCE, 1           Medical



     75 mcg                                         dose, On           Branch



                                                  Sun            



                                                  22 at           



                                                  1745,           



                                                  Routine           

 

     iopamidol      2022- No        [id number] 85mL      85 mL,           

[facility]



     (ISOVUE      0-30 10-30                          Intravenou           ity o

f



     370-500 mL)      03:00: 02:09                          s, ONCE, 1          

 [place]



     injection      00   :00                           dose, On           Medica

l



     85 mL                                         Sat            Branch



                                                  10/29/22           



                                                  at 2200,           



                                                  Routine           

 

     FENTanyl PF      2022- No             50ug      50 mcg,           Un

[facility]



     (SUBLIMAZE      0-30 10-30                          Slow IV           ity o

f



     (PF))      02:45: 01:47                          Push,           [place]



     injection      00   :00                           ONCE, 1           Medical



     50 mcg                                         dose, On           Branch



                                                  Sat            



                                                  10/29/22           



                                                  at 2145,           



                                                  Routine           

 

     cefdinir      2022- No             300mg      300 mg,           Univ

ers



     (OMNICEF)      0-30 10-30                          Oral,           ity of



     capsule 300      02:15: 03:14                          ONCE, 1           [facility]

[place]



     mg        00   :00                           dose, On           Medical



                                                  Sat            Branch



                                                  10/29/22           



                                                  at 2115,           



                                                  ASAP<br>Re           



                                                  ason for           



                                                  Anti-Infec           



                                                  tive:           



                                                  Documented           



                                                  Infection<           



                                                  br>Documen           



                                                  huma            



                                                  Infection           



                                                  Site:           



                                                  Urine<br>D           



                                                  uration of           



                                                  Therapy:           



                                                  Other (see           



                                                  Comments)           

 

     proMETHazin      2022 No             25mg      25 mg, IV           

[facility]



     e         0-30 10-30                          Piggyback,           ity of



     (PHENERGAN)      01:45: 01:47                          ONCE, 1           Te

[place]



     25 mg in      00   :00                           dose, On           Medical



     NaCl 0.9%                                         Sat            Branch



     (NS) 50 mL                                         10/29/22           



     IV                                           at 2045,           



     piggyback                                         ASAP           

 

     cefdinir      2022      Yes       [id number] 300mg      Take 1           Un

[facility]



     300 mg      0-29                               capsule by           ity of



     capsule      00:00:                               mouth           [place]



               00                                 every 12           Medical



                                                  (twelve)           Branch



                                                  hours.           

 

     cefdinir      2022      Yes       [id number] 300mg      Take 1           Un

[facility]



     300 mg      0-29                               capsule by           ity of



     capsule      00:00:                               mouth           [place]



               00                                 every 12           Medical



                                                  (twelve)           Branch



                                                  hours.           

 

     cefdinir      2022- No        [id number] 300mg      Take 1           U

nivers



     300 mg      0-29 -                          capsule by           ity of



     capsule      00:00: 00:00                          mouth           [place]



               00   :00                           every 12           Medical



                                                  (twelve)           Branch



                                                  hours.           

 

     HYDROmorpho      -      Yes            4mg       Take 4 mg           U

nivers



     ne 4 mg      0-23                               by mouth 3           ity of



     tablet      12:52:                               (three)           [place]



               28                                 times           Medical



                                                  daily as           Branch



                                                  needed.           

 

     HYDROmorpho      -1      Yes            4mg       Take 4 mg           U

nivers



     ne 4 mg      0-23                               by mouth 3           ity of



     tablet      12:52:                               (three)           [place]



               28                                 times           Medical



                                                  daily as           Branch



                                                  needed.           

 

     HYDROmorpho      -1      Yes            4mg       Take 4 mg           U

nivers



     ne 4 mg      0-23                               by mouth 3           ity of



     tablet      12:52:                               (three)           [place]



               28                                 times           Medical



                                                  daily as           Branch



                                                  needed.           

 

     HYDROmorpho      -1      Yes            4mg       Take 4 mg           U

nivers



     ne 4 mg      0-23                               by mouth 3           ity of



     tablet      12:52:                               (three)           [place]



               28                                 times           Medical



                                                  daily as           Branch



                                                  needed.           

 

     HYDROmorpho      2022- No             .5mg      0.5 mg,           Un

[facility]



     ne        0-23 10-                          Slow IV           ity of



     (DILAUDID)      03:15: 03:23                          Push,           [place]



     injection      00   :00                           ONCE, 1           Medical



     0.5 mg                                         dose, On           Branch



                                                  Sat            



                                                  10/22/22           



                                                  at 2215,           



                                                  Routine<br           



                                                  >Use           



                                                  approved           



                                                  by             



                                                  (Faculty):           



                                                  [facility]            



                                                  PROVIDER           

 

     HYDROmorpho      2022- No             .5mg      0.5 mg,           Un

[place]



     ne        0-22 10-                          Slow IV           ity of



     (DILAUDID)      02:00: 02:00                          Push,           [place]



     injection      00   :00                           ONCE, 1           Medical



     0.5 mg                                         dose, On           Branch



                                                  Fri            



                                                  10/21/22           



                                                  at 2100,           



                                                  Routine<br           



                                                  >Use           



                                                  approved           



                                                  by             



                                                  (Faculty):           



                                                  [facility]            



                                                  PROVIDER           

 

     doxycycline      2022- No             100mg      100 mg, IV         

  [facility]



     (VIBRAMYCIN      0-21 10-22                          Piggyback,           i

ty of



     ) 100 mg in      23:30: 20:05                          Q12H ABX,           

[place]



     NaCl 0.9%      00   :47                           10 doses,           Medic

al



     (NS) 100 mL                                         First dose           Br

anch



     MINI-BAG                                         on Fri           



                                                  10/21/22           



                                                  at 1830,           



                                                  Last dose           



                                                  on Wed           



                                                  10/26/22           



                                                  at 0630,           



                                                  Administer           



                                                  over 60           



                                                  Minutes,           



                                                  100            



                                                  mL<br>Reas           



                                                  on for           



                                                  Anti-Infec           



                                                  tive:           



                                                  Empiric           



                                                  Therapy           



                                                  for            



                                                  Suspected           



                                                  Infection<           



                                                  br>Empiric           



                                                  Therapy           



                                                  Site: Skin           



                                                  / Soft           



                                                  tissue<br>           



                                                  Duration           



                                                  of             



                                                  therapy:           



                                                  72 hours           

 

     enoxaparin      2022      Yes            40mg      40 mg,           Unive

rs



     (LOVENOX)      0-21                               Subcutaneo           ity 

of



     injection      22:00:                               us, DAILY,           Te

[place]



     40 mg      00                                 First dose           Medical



                                                  on Fri           Branch



                                                  10/21/22           



                                                  at 1700,           



                                                  Until           



                                                  Discontinu           



                                                  ed,            



                                                  Routine           

 

     ceFEPIme      2022- Yes            2000mg      2,000 mg,           U

nivers



     (MAXIPIME)      0-21 10-26                          IV             ity of



     2,000 mg in      21:00: 20:59                          [place], [place]



     NaCl 0.9%      00   :00                           Q8H ABX,           Medica

l



     (NS) 50 mL                                         15 doses,           [facility]





     MINI-BAG                                         First dose           



                                                  on Fri           



                                                  10/21/22           



                                                  at 1600,           



                                                  Last dose           



                                                  on Wed           



                                                  10/26/22           



                                                  at 0800,           



                                                  Administer           



                                                  over 4           



                                                  Hours, 50           



                                                  mL<br>Reas           



                                                  on for           



                                                  Anti-Infec           



                                                  tive:           



                                                  Empiric           



                                                  Therapy           



                                                  for            



                                                  Suspected           



                                                  Infection<           



                                                  br>Empiric           



                                                  Therapy           



                                                  Site:           



                                                  Abdominal<           



                                                  br>Duratio           



                                                  n of           



                                                  therapy: 5           



                                                  days           

 

     HYDROmorpho      2022      Yes            4mg       4 mg,           Unive

rs



     ne        0-21                               Oral,           ity of



     (DILAUDID)      19:38:                               Q4HPRN,           [place]

[place]



     tablet 4 mg      15                                 Starting           Medi

[facility]



                                                  on Fri           Branch



                                                  10/21/22           



                                                  at 1438,           



                                                  Until           



                                                  Discontinu           



                                                  ed,            



                                                  Routine,           



                                                  Pain           



                                                  (scale           



                                                  7-10)           

 

     sodium      2022      Yes                      Topical,           [facility]



     hypochlorit      0-21                               BID, First           it

y of



     e 0.125 %      18:30:                               dose on           [place]



     ([facility]S                  Medical



     SOLUTION)                                         10/21/22           [facility]



     solution                                         at 1330,           



                                                  Until           



                                                  Discontinu           



                                                  ed,            



                                                  Routine           

 

     ceFEPIme      2022- No             2000mg      2,000 mg,           U

nivers



     (MAXIPIME)      0- 10-                          IV             ity of



     2,000 mg in      13:15: 14:55                          [place], [place]



     NaCl 0.9%      00   :00                           ONCE, 1           Medical



     (NS) 50 mL                                         dose, On           [facility]

h



     MINI-BAG                                         Fri            



                                                  10/21/22           



                                                  at 0815,           



                                                  Administer           



                                                  over 30           



                                                  Minutes,           



                                                  50             



                                                  mL<br>Reas           



                                                  on for           



                                                  Anti-Infec           



                                                  tive:           



                                                  Empiric           



                                                  Therapy           



                                                  for            



                                                  Suspected           



                                                  Infection<           



                                                  br>Empiric           



                                                  Therapy           



                                                  Site:           



                                                  Urine<br>D           



                                                  uration of           



                                                  therapy: 5           



                                                  days           

 

     Sliding      2022      Yes                      Subcutaneo           Univ

ers



     Scale      0-21                               us, AC+HS,           ity of



     Insulin-Reg      12:30:                               First dose           

[place]



     ular + Fsbg      00                                 on Fri           [facility]

l



     Testing                                         10/21/22           [facility]



                                                  at 0730,           



                                                  Until           



                                                  Discontinu           



                                                  ed,            



                                                  Routine           

 

     acetaminoph      2022      Yes            650mg      650 mg,           Un

[facility]



     en        0-                               Oral,           ity of



     (TYLENOL)      12:08:                               Q6HPRN,           [place]



     tablet 650      12                                 Starting           Medic

al



     mg                                           on Fri           [facility]



                                                  10/21/22           



                                                  at 0708,           



                                                  Until           



                                                  Discontinu           



                                                  ed,            



                                                  Routine,           



                                                  Pain           



                                                  (scale           



                                                  1-3)           

 

     HYDROmorpho      2022 No             .5mg      0.5 mg,           Un

[facility]



     ne        0- 10-                          Slow IV           ity of



     (DILAUDID)      12:07: 16:23                          Push,           [place]



     injection      21   :00                           Q4HPRN, 2           Medic

al



     0.5 mg                                         doses,           [facility]



                                                  Starting           



                                                  on Fri           



                                                  10/21/22           



                                                  at 0707,           



                                                  Until           



                                                  Discontinu           



                                                  ed,            



                                                  Routine,           



                                                  Pain           



                                                  (scale           



                                                  7-10)<br>U           



                                                  se             



                                                  approved           



                                                  by             



                                                  (Faculty):           



                                                  ADC            



                                                  PROVIDER           

 

     proMETHazin      2022      Yes            12.5mg      12.5 mg,           

[facility]



     e         0-21                               IV             ity of



     (PHENERGAN)      12:07:                               Piggyback,           

[place]



     12.5 mg in      04                                 Q4HPRN,           [facility]

l



     NaCl 0.9%                                         Starting           [facility]



     (NS) 50 mL                                         on Fri           



     IV                                           10/21/22           



     piggyback                                         at 0707,           



                                                  Until           



                                                  Discontinu           



                                                  ed,            



                                                  Routine,           



                                                  Nausea and           



                                                  Vomiting           



                                                  (N/V)           

 

     dextrose      2022      Yes            250mL      250 mL, IV           Un

[facility]



     10% (D10W)      0-21                               Infusion,           ity 

of



     bolus      11:02:                               PRN - SEE           [place]



     infusion      05                                 INSTRUCTIO           Medic

al



     250 mL                                         NS,            [facility]



                                                  Administer           



                                                  over 60           



                                                  Minutes,           



                                                  Other, If           



                                                  blood           



                                                  glucose is           



                                                  &amp;lt;           



                                                  or = 70           



                                                  mg/dL and           



                                                  patient is           



                                                  unable to           



                                                  swallow or           



                                                  has mental           



                                                  status           



                                                  changes,           



                                                  Starting           



                                                  on Fri           



                                                  10/21/22           



                                                  at             



                                                  0602<br>If           



                                                  blood           



                                                  glucose is           



                                                  < or = 70           



                                                  mg/dL and           



                                                  patient is           



                                                  unable to           



                                                  swallow or           



                                                  has mental           



                                                  status           



                                                  changes           



                                                  (Give           



                                                  glucagon           



                                                  order if           



                                                  patient           



                                                  needs           



                                                  fluid           



                                                  restrictio           



                                                  n):            



                                                  &nbsp;IF           



                                                  IV access           



                                                  available:           



                                                  Dextrose           



                                                  10%.           



                                                  &nbsp;1.           



                                                  125 mL (?           



                                                  bag) of           



                                                  D10W IV           



                                                  infusion -           



                                                  equivalent           



                                                  to 12.5 g           



                                                  dextrose           



                                                  &nbsp;2.           



                                                  Blood           



                                                  glucose -           



                                                  draw blood           



                                                  glucose 15           



                                                  minutes           



                                                  after D10W           



                                                  Administra           



                                                  tion.           



                                                  &amp;nbsp;           



                                                  3. If           



                                                  blood           



                                                  glucose is           



                                                  < 80           



                                                  mg/dL,           



                                                  repeat.<br           



                                                  >              

 

     acetaminoph      2022      Yes            650mg      650 mg,           Un

[facility]



     en        0                               Oral,           ity of



     (TYLENOL)      11:01:                               Q6HPRN,           [place]



     tablet 650      26                                 Starting           Medic

al



     mg                                           on Fri           [facility]



                                                  10/21/22           



                                                  at 0601,           



                                                  Until           



                                                  Discontinu           



                                                  ed,            



                                                  Routine,           



                                                  Pain           



                                                  (scale           



                                                  1-3)           

 

     iopamidol      2022- No        [id number] 100mL      100 mL,          

 [facility]



     (ISOVUE      0-21 10-21                          Intravenou           ity o

f



     370-500 mL)      08:00: 08:00                          s, ONCE, 1          

 [place]



     injection      00   :00                           dose, On           Med[facility]

l



     100 mL                                         Fri            [facility]



                                                  10/21/22           



                                                  at 0300,           



                                                  Routine           

 

     ceFEPIme      2022- No             1000mg      1,000 mg,           U

nivers



     (MAXIPIME)      0-21 10-21                          IV             ity of



     injection      07:15: 07:36                          Piggyback,           T

[facility]



     1,000 mg      00   :00                           ONCE, 1           Medical



                                                  dose, On           [facility]



                                                  Fri            



                                                  10/21/22           



                                                  at 0215,           



                                                  STAT<br>Re           



                                                  ason for           



                                                  Anti-Infec           



                                                  tive:           



                                                  Documented           



                                                  Infection<           



                                                  br>Documen           



                                                  huma            



                                                  Infection           



                                                  Site: Skin           



                                                  / Soft           



                                                  Tissue<br>           



                                                  Duration           



                                                  of             



                                                  Therapy:           



                                                  Other (see           



                                                  Comments)           

 

     FENTanyl PF      2022- No             50ug      50 mcg,           Un

[facility]



     (SUBLIMAZE      0-21 10-21                          Slow IV           ity o

f



     (PF))      06:30: 06:12                          Push,           [place]



     injection      00   :00                           ONCE, 1           Medical



     50 mcg                                         dose, On           [facility]



                                                  Fri            



                                                  10/21/22           



                                                  at 0130,           



                                                  ASAP           

 

     proMETHazin      2022- No             12.5mg      12.5 mg,          

 [facility]



     e         0-21 10-21                          IV             ity of



     (PHENERGAN)      06:15: 06:12                          Piggyback,          

 [place]



     12.5 mg in      00   :00                           ONCE, 1           Medica

l



     NaCl 0.9%                                         dose, On           [facility]



     (NS) 50 mL                                         Fri            



     IV                                           10/21/22           



     piggyback                                         at 0115,           



                                                  ASAP           

 

     insulin            Yes            26U       26 Units,           Unive

rs



     glargine      8-14                               Subcutaneo           ity o

f



     (LANTUS      02:00:                               us, [facility],           [place]



     U-100)      00                                 First dose           Medical



     injection                                         (after           [facility]



     26 Units                                         last           



                                                  modificati           



                                                  on) on Sat           



                                                  22 at           



                                                  2100,           



                                                  Until           



                                                  Discontinu           



                                                  ed,            



                                                  Routine           

 

     insulin      2022- No             24U       24 Units,           Univ

ers



     glargine      -                          Subcutaneo           ity 

of



     (LANTUS      20:38: 20:43                          us, ONCE,           [place]

[place]



     U-100)      00   :00                           1 dose, On           Medical



     injection                                         Fri            [facility]



     24 Units                                         22 at           



                                                  1545, ASAP           

 

     sennosides-            Yes            1{tbl}      1 tablet,          

 [facility]



     docusate                                     Oral,           ity of



     sodium      14:00:                               DAILY,           [place]



     (SENOKOT-S)      00                                 First dose           Me

dical



     8.6-50 mg                                         on Fri           [facility]



     per tablet                                         22 at           



     1 tablet                                         0900,           



                                                  Until           



                                                  Discontinu           



                                                  ed,            



                                                  Routine           

 

     polyethylen            Yes            17g       17 g,           Unive

rs



     e glycol                                     Oral,           ity of



     3350 powder      14:00:                               DAILY,           [place]

[place]



     17 g      00                                 First dose           Medical



                                                  on Fri           [facility]



                                                  22 at           



                                                  0900,           



                                                  Until           



                                                  Discontinu           



                                                  ed,            



                                                  Routine           

 

     insulin NPH       No             16U       16 Units,           

[facility]



     (HUMULIN N)                                Subcutaneo           i

ty of



     injection      14:00: 17:23                          us,            [place]



     16 Units      00   :36                           QAM+HS,           Medical



                                                  First dose           [facility]



                                                  (after           



                                                  last           



                                                  modificati           



                                                  on) on Fri           



                                                  22 at           



                                                  0900,           



                                                  Until           



                                                  Discontinu           



                                                  ed,            



                                                  Routine           

 

     Sliding            Yes                      Subcutaneo           Univ

ers



     Scale                                     us, TID           ity of



     Insulin -      13:00:                               MEALS+HS,           [facility]

as



     Lispro      00                                 First dose           Medical



     (HumaLOG) +                                         (after           [facility]



     Fsbg                                         last           



     Testing                                         modificati           



                                                  on) on Fri           



                                                  22 at           



                                                  0800,           



                                                  Until           



                                                  Discontinu           



                                                  ed,            



                                                  Routine           

 

     insulin NPH       No             8U        8 Units,           U

nivers



     (HUMULIN N)      12                          Subcutaneo           i

ty of



     injection 8      02:00: 12:34                          us, QHS,           T

[facility]



     Units      00   :51                           First dose           Medical



                                                  (after           [facility]



                                                  last           



                                                  modificati           



                                                  on) on Thu           



                                                  22 at           



                                                  2100,           



                                                  Until           



                                                  Discontinu           



                                                  ed,            



                                                  Routine           

 

     repaglinide      -0      Yes       [id number] .5mg      Take 1          

 [facility]



     0.5 mg      8-12                               tablet by           ity of



     tablet      00:00:                               mouth in           [address]



                                                  and 1           



                                                  tablet at           



                                                  noon and 1           



                                                  tablet in           



                                                  the            



                                                  evening.           



                                                  Take           



                                                  before           



                                                  meals.           

 

     sodium      -0      Yes       [id number]           Apply to           [facility]

[facility]



     hypochlorit      8-12                               area(s)           ity o

f



     e 0.25%      00:00:                               daily.           [address]

 

     repaglinide      -0      Yes       [id number] .5mg      Take 1          

 [facility]



     0.5 mg      8-12                               tablet by           ity of



     tablet      00:00:                               mouth in           [address]



                                                  and 1           



                                                  tablet at           



                                                  noon and 1           



                                                  tablet in           



                                                  the            



                                                  evening.           



                                                  Take           



                                                  before           



                                                  meals.           

 

     sodium      -0      Yes       [id number]           Apply to           Un

[facility]



     hypochlorit      8-12                               area(s)           ity o

f



     e 0.25%      00:00:                               daily.           [address]

 

     repaglinide      -0      Yes       [id number] .5mg      Take 1          

 [facility]



     0.5 mg      8-12                               tablet by           ity of



     tablet      00:00:                               mouth in           [address]



                                                  and 1           



                                                  tablet at           



                                                  noon and 1           



                                                  tablet in           



                                                  the            



                                                  evening.           



                                                  Take           



                                                  before           



                                                  meals.           

 

     sodium      -0      Yes       [id number]           Apply to           Un

[facility]



     hypochlorit      8-12                               area(s)           ity o

f



     e 0.25%      00:00:                               daily.           [address]

 

     repaglinide      -0      Yes       [id number] .5mg      Take 1          

 [facility]



     0.5 mg      8-12                               tablet by           ity of



     tablet      00:00:                               mouth in           [address]



                                                  and 1           



                                                  tablet at           



                                                  noon and 1           



                                                  tablet in           



                                                  the            



                                                  evening.           



                                                  Take           



                                                  before           



                                                  meals.           

 

     sodium      2022-0      Yes       [id number]           Apply to           [facility]

[facility]



     hypochlorit      8-12                               area(s)           ity o

f



     e 0.25%      00:00:                               daily.           [place]



     solution      00                                                Medical



                                                                 [facility]

 

     repaglinide      2022-0      Yes       [id number] .5mg      Take 1          

 [facility]



     0.5 mg      8-12                               tablet by           ity of



     tablet      00:00:                               mouth in           [place]



               00                                 the            Medical



                                                  morning           [place]



                                                  and 1           



                                                  tablet at           



                                                  noon and 1           



                                                  tablet in           



                                                  the            



                                                  evening.           



                                                  Take           



                                                  before           



                                                  meals.           

 

     sodium      2022-0      Yes       [id number]           Apply to           Un

[facility]



     hypochlorit      8-12                               area(s)           ity o

f



     e 0.25%      00:00:                               daily.           [place]



     solution      00                                                Medical



                                                                 [facility]

 

     repaglinide      2022-0      Yes       [id number] .5mg      Take 1          

 [facility]



     0.5 mg      8-12                               tablet by           ity of



     tablet      00:00:                               mouth in           [place]



               00                                 the            Medical



                                                  morning           [place]



                                                  and 1           



                                                  tablet at           



                                                  noon and 1           



                                                  tablet in           



                                                  the            



                                                  evening.           



                                                  Take           



                                                  before           



                                                  meals.           

 

     sodium      2022-0      Yes       [id number]           Apply to           [facility]

[facility]



     hypochlorit      8-12                               area(s)           ity o

f



     e 0.25%      00:00:                               daily.           [place]



     solution      00                                                Medical



                                                                 [facility]

 

     repaglinide      2022-0      Yes       [id number] .5mg      Take 1          

 [facility]



     0.5 mg      8-12                               tablet by           ity of



     tablet      00:00:                               mouth in           [place]



               00                                 the            Medical



                                                  morning           [place]



                                                  and 1           



                                                  tablet at           



                                                  noon and 1           



                                                  tablet in           



                                                  the            



                                                  evening.           



                                                  Take           



                                                  before           



                                                  meals.           

 

     sodium      2022-0      Yes       [id number]           Apply to           Un

[facility]



     hypochlorit      8-12                               area(s)           ity o

f



     e 0.25%      00:00:                               daily.           [place]



     solution      00                                                Medical



                                                                 [facility]

 

     repaglinide      2022-0      Yes       [id number] .5mg      Take 1          

 [facility]



     0.5 mg      8-12                               tablet by           ity of



     tablet      00:00:                               mouth in           [place]



               00                                 the            Medical



                                                  morning           [place]



                                                  and 1           



                                                  tablet at           



                                                  noon and 1           



                                                  tablet in           



                                                  the            



                                                  evening.           



                                                  Take           



                                                  before           



                                                  meals.           

 

     sodium      2022-0      Yes       [id number]           Apply to           Un

[facility]



     hypochlorit      8-12                               area(s)           ity o

f



     e 0.25%      00:00:                               daily.           [place]



     solution      00                                                Medical



                                                                 [place]

 

     repaglinide      2022-0      Yes       [id number] .5mg      Take 1          

 [facility]



     0.5 mg      8-12                               tablet by           ity of



     tablet      00:00:                               mouth in           [place]



               00                                 the            Medical



                                                  morning           [place]



                                                  and 1           



                                                  tablet at           



                                                  noon and 1           



                                                  tablet in           



                                                  the            



                                                  evening.           



                                                  Take           



                                                  before           



                                                  meals.           

 

     sodium      2022-0      Yes       [id number]           Apply to           Un

[facility]



     hypochlorit                                     area(s)           ity o

f



     e 0.25%      00:00:                               daily.           [place]



     solution      00                                                [facility]

 

     apixaban 5      2022- No             5mg       Take 1           Un[facility]

ers



     mg tablet                                tablet by           ity 

of



               00:00: 04:59                          mouth in           [place]



               00   :00                           the            Medical



                                                  morning           [place]



                                                  and 1           



                                                  tablet in           



                                                  the            



                                                  evening.           



                                                  Do all           



                                                  this for           



                                                  30 days.           



                                                  Indication           



                                                  s:             



                                                  Symtomatic           



                                                  Superficia           



                                                  l Vein           



                                                  thrombosis           

 

     insulin      2022- No        [id number] 24U       inject 24          

 [facility]



     glargine                                Units           ity of



     100 unit/mL      00:00: 04:59                          under the           

[place]



     injection      00   :00                           skin [facility]



                                                  for 30           



                                                  days.           

 

     metFORMIN      2022- No        [id number] 500mg      Take 1          

 [facility]



     500 mg                                tablet by           ity of



     tablet      00:00: 04:59                          mouth in           [place]



               00   :00                           [facility]



                                                  and 1           



                                                  tablet in           



                                                  the            



                                                  evening.           



                                                  Take with           



                                                  meals. Do           



                                                  all this           



                                                  for 30           



                                                  days.           

 

     dulaglutide      2022- No        [id number] .75mg      inject 1      

     [facility]



     (TRULICITY)                                Pen under           it

y of



     0.75 mg/0.5      00:00: 04:59                          the skin           [facility]

[facility]



     mL PnIj      00   :00                           weekly for           [facility]

l



                                                  30 days.           [facility]

 

     apixaban 5      2022- No             5mg       Take 1           Un[facility]

ers



     mg tablet                                tablet by           ity 

of



               00:00: 04:59                          mouth in           [place]



               00   :00                           [facility]



                                                  and 1           



                                                  tablet in           



                                                  the            



                                                  evening.           



                                                  Do all           



                                                  this for           



                                                  30 days.           



                                                  Indication           



                                                  s:             



                                                  Symtomatic           



                                                  Superficia           



                                                  l Vein           



                                                  thrombosis           

 

     insulin      2022- No        [id number] 24U       inject 24          

 [facility]



     glargine                                Units           ity of



     100 unit/mL      00:00: 04:59                          under the           

[place]



     injection      00   :00                           skin [facility]



                                                  for 30           



                                                  days.           

 

     metFORMIN      2022- No        [id number] 500mg      Take 1          

 [facility]



     500 mg                                tablet by           ity of



     tablet      00:00: 04:59                          mouth in           [place]



               00   :00                           the            Medical



                                                  morning           [facility]



                                                  and 1           



                                                  tablet in           



                                                  the            



                                                  evening.           



                                                  Take with           



                                                  meals. Do           



                                                  all this           



                                                  for 30           



                                                  days.           

 

     dulaglutide      2022- No        [id number] .75mg      inject 1      

     [facility]



     (TRULICITY)                                Pen under           it

y of



     0.75 mg/0.5      00:00: 04:59                          the skin           [facility]

[facility]



     mL PnIj      00   :00                           weekly for           [facility]

l



                                                  30 days.           [facility]

 

     apixaban 5      -2- No             5mg       Take 1           Univ

ers



     mg tablet                                tablet by           ity 

of



               00:00: 04:59                          mouth in           [place]



               00   :00                           the            Medical



                                                  morning           [facility]



                                                  and 1           



                                                  tablet in           



                                                  the            



                                                  evening.           



                                                  Do all           



                                                  this for           



                                                  30 days.           



                                                  Indication           



                                                  s:             



                                                  Symtomatic           



                                                  Superficia           



                                                  l Vein           



                                                  thrombosis           

 

     insulin      2022- No        [id number] 24U       inject 24          

 [facility]



     glargine                                Units           ity of



     100 unit/mL      00:00: 04:59                          under the           

[place]



     injection      00   :00                           skin [facility]



                                                  for 30           



                                                  days.           

 

     metFORMIN      2022- No        [id number] 500mg      Take 1          

 [facility]



     500 mg                                tablet by           ity of



     tablet      00:00: 04:59                          mouth in           [place]



               00   :00                           the            Medical



                                                  morning           [facility]



                                                  and 1           



                                                  tablet in           



                                                  the            



                                                  evening.           



                                                  Take with           



                                                  meals. Do           



                                                  all this           



                                                  for 30           



                                                  days.           

 

     dulaglutide      2022- No        [id number] .75mg      inject 1      

     [facility]



     (TRULICITY)                                Pen under           it

y of



     0.75 mg/0.5      00:00: 04:59                          the skin           [facility]

[facility]



     mL PnIj      00   :00                           weekly for           [facility]

l



                                                  30 days.           [facility]

 

     apixaban 5      2022- No             5mg       Take 1           Univ

ers



     mg tablet                                tablet by           ity 

of



               00:00: 04:59                          mouth in           [place]



               00   :00                           the            Medical



                                                  morning           [facility]



                                                  and 1           



                                                  tablet in           



                                                  the            



                                                  evening.           



                                                  Do all           



                                                  this for           



                                                  30 days.           



                                                  Indication           



                                                  s:             



                                                  Symtomatic           



                                                  Superficia           



                                                  l Vein           



                                                  thrombosis           

 

     insulin      2022- No        [id number] 24U       inject 24          

 [facility]



     glargine                                Units           ity of



     100 unit/mL      00:00: 04:59                          under the           

[place]



     injection      00   :00                           skin [facility]



                                                  for 30           



                                                  days.           

 

     metFORMIN      2022- No        [id number] 500mg      Take 1          

 [facility]



     500 mg                                tablet by           ity of



     tablet      00:00: 04:59                          mouth in           [place]



               00   :00                           the            Medical



                                                  morning           [facility]



                                                  and 1           



                                                  tablet in           



                                                  the            



                                                  evening.           



                                                  Take with           



                                                  meals. Do           



                                                  all this           



                                                  for 30           



                                                  days.           

 

     dulaglutide      2022- No        [id number] .75mg      inject 1      

     [facility]



     (TRULICITY)                                Pen under           it

y of



     0.75 mg/0.5      00:00: 04:59                          the skin           [facility]

[place]



     mL PnIj      00   :00                           weekly for           [facility]

l



                                                  30 days.           [facility]

 

     linezolid      2022- No        [id number] 600mg      Take 1          

 [facility]



     600 mg                                tablet by           ity of



     tablet      00:00: 04:59                          mouth           [place]



               00   :00                           every 12           [facility]



                                                  (twelve)           [place]



                                                  hours for           



                                                  14 days.           

 

     fluconazole      2022- No        [id number] 400mg      Take 2        

   [facility]



     200 mg                                tablets by           ity of



     tablet      00:00: 04:59                          mouth in           [place]



               00   :00                           the            [facility]



                                                  for 14           



                                                  days.           

 

     ciprofloxac      2022- No        [id number] 500mg      Take 2        

   [facility]



     in HCl 250                                tablets by           it

y of



     mg tablet      00:00: 04:59                          mouth in           [place]

as



               00   :00                           the            [facility]



                                                  and 2           



                                                  tablets in           



                                                  the            



                                                  evening.           



                                                  Do all           



                                                  this for           



                                                  14 days.           

 

     linezolid      2022- No        [id number] 600mg      Take 1          

 [facility]



     600 mg                                tablet by           ity of



     tablet      00:00: 04:59                          mouth           [place]



               00   :00                           every 12           [facility]



                                                  (twelve)           [place]



                                                  hours for           



                                                  14 days.           

 

     fluconazole      2022- No        [id number] 400mg      Take 2        

   [facility]



     200 mg      -                          tablets by           ity of



     tablet      00:00: 04:59                          mouth in           [place]



               00   :00                           the            [facility]



                                                  for 14           



                                                  days.           

 

     ciprofloxac      2022- No        [id number] 500mg      Take 2        

   [facility]



     in HCl 250                                tablets by           it

y of



     mg tablet      00:00: 04:59                          mouth in           [place]

as



               00   :00                           the            [facility]



                                                  and 2           



                                                  tablets in           



                                                  the            



                                                  evening.           



                                                  Do all           



                                                  this for           



                                                  14 days.           

 

     linezolid      0 - No        [id number] 600mg      Take 1          

 [facility]



     600 mg      8- 08-27                          tablet by           ity of



     tablet      00:00: 04:59                          mouth           [place]



               00   :00                           every 12           [facility]



                                                  hours for           



                                                  14 days.           

 

     fluconazole      -0 - No        [id number] 400mg      Take 2        

   [facility]



     200 mg      8-12 08-27                          tablets by           ity of



     tablet      00:00: 04:59                          mouth in           [place]



               00   :00                           the            [facility]



                                                  morning           [facility]



                                                  for 14           



                                                  days.           

 

     ciprofloxac      -0 - No        [id number] 500mg      Take 2        

   [facility]



     in HCl 250      8-27                          tablets by           it

y of



     mg tablet      00:00: 04:59                          mouth in           [place]

as



               00   :00                           the            [facility]



                                                  morning           [facility]



                                                  and 2           



                                                  tablets in           



                                                  the            



                                                  evening.           



                                                  Do all           



                                                  this for           



                                                  14 days.           

 

     linezolid      -0 - No        [id number] 600mg      Take 1          

 [facility]



     600 mg      8--27                          tablet by           ity of



     tablet      00:00: 04:59                          mouth           [place]



               00   :00                           every 12           [facility]



                                                  hours for           



                                                  14 days.           

 

     fluconazole      -2022- No        [id number] 400mg      Take 2        

   [facility]



     200 mg      8--27                          tablets by           ity of



     tablet      00:00: 04:59                          mouth in           [place]



               00   :00                           the            [facility]



                                                  for 14           



                                                  days.           

 

     ciprofloxac      -0 - No        [id number] 500mg      Take 2        

   [facility]



     in HCl 250      8-27                          tablets by           it

y of



     mg tablet      00:00: 04:59                          mouth in           [place]

as



               00   :00                           the            Medical



                                                  morning           [facility]



                                                  and 2           



                                                  tablets in           



                                                  the            



                                                  evening.           



                                                  Do all           



                                                  this for           



                                                  14 days.           

 

     gabapentin      -0 - No        [id number] 300mg      Take 1         

  [facility]



     300 mg      8- 08-20                          capsule by           ity of



     capsule      00:00: 04:59                          mouth in           [place]



               00   :00                           the            Medical



                                                  morning           [facility]



                                                  and 1           



                                                  capsule at           



                                                  noon and 1           



                                                  capsule in           



                                                  the            



                                                  evening.           



                                                  Do all           



                                                  this for 7           



                                                  days.           

 

     gabapentin      -0 - No        [id number] 300mg      Take 1         

  [facility]



     300 mg      8- 08-20                          capsule by           ity of



     capsule      00:00: 04:59                          mouth in           [place]



               00   :00                           the            Medical



                                                  morning           [facility]



                                                  and 1           



                                                  capsule at           



                                                  noon and 1           



                                                  capsule in           



                                                  the            



                                                  evening.           



                                                  Do all           



                                                  this for 7           



                                                  days.           

 

     gabapentin      2022- No        [id number] 300mg      Take 1         

  [facility]



     300 mg                                capsule by           ity of



     capsule      00:00: 04:59                          mouth in           [place]



               00   :00                           [facility]



                                                  and 1           



                                                  capsule at           



                                                  noon and 1           



                                                  capsule in           



                                                  the            



                                                  evening.           



                                                  Do all           



                                                  this for 7           



                                                  days.           

 

     gabapentin      2022- No        [id number] 300mg      Take 1         

  [facility]



     300 mg                                capsule by           ity of



     capsule      00:00: 04:59                          mouth in           [place]



               00   :00                           [facility]



                                                  and 1           



                                                  capsule at           



                                                  noon and 1           



                                                  capsule in           



                                                  the            



                                                  evening.           



                                                  Do all           



                                                  this for 7           



                                                  days.           

 

     sodium      2022- No        [id number]           [facility]           [facility]



     hypochlorit                                area(s)           ity 

of



     e 0.25%      00:00: 00:00                          daily.           [place]



     solution      00   :00                                          [facility]

 

     repaglinide       No        [id number] .5mg      Take 1         

  [facility]



     0.5 mg                                tablet by           ity of



     tablet      00:00: 00:00                          mouth in           [place]



               00   :00                           [facility]



                                                  and 1           



                                                  tablet at           



                                                  noon and 1           



                                                  tablet in           



                                                  the            



                                                  evening.           



                                                  Take           



                                                  before           



                                                  meals. Do           



                                                  all this           



                                                  for 30           



                                                  days.           

 

     Sliding      2022- No                       Subcutaneo           Uni

vers



     Scale                                , TID           ity of



     Insulin -      22:00: 12:34                          MEALS+HS,           [place]

[place]



     Lispro      00   :51                           First dose           Medical



     (HumaLOG) +                                         (after           [place]



     Fsbg                                         last           



     Testing                                         modificati           



                                                  on) on u           



                                                  22 at           



                                                  1700,           



                                                  Until           



                                                  Discontinu           



                                                  ed,            



                                                  Routine           

 

     acetaminoph            Yes            1000mg      1,000 mg,          

 [facility]



     en        8-11                               Oral, Q8H,           ity of



     (TYLENOL)      19:00:                               First dose           [facility]

[place]



     tablet      00                                 on Thu           Medical



     1,000 mg                                         22 at           [facility]

h



                                                  1400,           



                                                  Until           



                                                  Discontinu           



                                                  ed,            



                                                  Routine           

 

     methocarbam            Yes            500mg      500 mg,           Un

[place]



     oL        8-11                               Oral, Q6H,           ity of



     (ROBAXIN)      17:00:                               First dose           Te

[place]



     tablet 500      00                                 on u           Medical



     mg                                           22 at           [facility]



                                                  1200,           



                                                  Until           



                                                  Discontinu           



                                                  ed,            



                                                  Routine           

 

     Sliding      2022-0 2022- No                       Subcutaneo           Uni

vers



     Scale                                us, TID           ity of



     Insulin -      17:00: 19:31                          MEALS+HS,           [facility]

[place]



     Lispro      00   :30                           First dose           Medical



     (HumaLOG) +                                         (after           [facility]



     Fsbg                                         last           



     Testing                                         modificati           



                                                  on) on Thu           



                                                  22 at           



                                                  1200,           



                                                  Until           



                                                  Discontinu           



                                                  ed,            



                                                  Routine           

 

     insulin NPH            Yes            24U       24 Units,           U

nivers



     (HUMULIN N)                                     Subcutaneo           it

y of



     injection      14:00:                               us, QAM,           [place]

s



     24 Units      00                                 First dose           Medic

al



                                                  (after           [facility]



                                                  last           



                                                  modificati           



                                                  on) on Thu           



                                                  22 at           



                                                  0900,           



                                                  Until           



                                                  Discontinu           



                                                  ed,            



                                                  Routine           

 

     insulin NPH                   20U       20 Units,           

[facility]



     (HUMULIN N)                                Subcutaneo           i

ty of



     injection      14:00: 19:31                          us, QAM,           [place]

as



     20 Units      00   :02                           First dose           Medic

al



                                                  (after           [facility]



                                                  last           



                                                  modificati           



                                                  on) on Thu           



                                                  22 at           



                                                  0900,           



                                                  Until           



                                                  Discontinu           



                                                  ed,            



                                                  Routine           

 

     insulin            Yes            5U        5 Units,           Univer

s



     lispro                                     Subcutaneo           ity of



     (human)      13:45:                               us, TID           [place]



     (HumaLOG      00                                 MEALS,           Medical



     U-100)                                         First dose           [facility]



     injection 5                                         (after           



     Units                                         last           



                                                  modificati           



                                                  on) on Thu           



                                                  22 at           



                                                  0845,           



                                                  Until           



                                                  Discontinu           



                                                  ed,            



                                                  Routine           

 

     apixaban            Yes            5mg       5 mg,           [facility]



     (ELIQUIS)                                     Oral, BID,           ity 

of



     tablet 5 mg      13:00:                               First dose           

[place]



               00                                 on Thu           Medical



                                                  22 at           [facility]



                                                  0800,           



                                                  Until           



                                                  Discontinu           



                                                  ed,            



                                                  Routine<br           



                                                  >Indicatio           



                                                  ns: DVT/PE           

 

     celecoxib            Yes            100mg      100 mg,           Univ

ers



     (CELEBREX)                                     Oral, BID           ity 

of



     capsule 100      13:00:                               MEALS,           [place]

s



     mg        00                                 First dose           Medical



                                                  on Thu           [facility]



                                                  22 at           



                                                  0800,           



                                                  Until           



                                                  Discontinu           



                                                  ed,            



                                                  Routine           

 

     gabapentin            Yes            300mg      300 mg,           Uni

vers



     (NEURONTIN)                                     Oral, TID,           it

y of



     capsule 300      13:00:                               First dose           

[place]



     mg        00                                 on Thu           Medical



                                                  22 at           [facility]



                                                  0800,           



                                                  Until           



                                                  Discontinu           



                                                  ed,            



                                                  Routine           

 

     HYDROmorpho            Yes            4mg       4 mg,           Unive

rs



     ne                                       Oral, TID,           ity of



     (DILAUDID)      13:00:                               First dose           [facility]

[place]



     tablet 4 mg      00                                 (after           [facility]

l



                                                  last           [facility]



                                                  modificati           



                                                  on) on Thu           



                                                  22 at           



                                                  0800,           



                                                  Until           



                                                  Discontinu           



                                                  ed,            



                                                  Routine           

 

     linezolid      2022- No             600mg      600 mg,           Uni

vers



     (ZYVOX)                                Oral,           ity of



     tablet 600      03:15: 03:49                          Q12H, 1           Eric

as



     mg        00   :00                           dose,           Medical



                                                  First dose           [facility]



                                                  on Wed           



                                                  8/10/22 at           



                                                  2215,           



                                                  ASAP<br>Re           



                                                  ason for           



                                                  Anti-Infec           



                                                  tive:           



                                                  Documented           



                                                  Infection<           



                                                  br>Documen           



                                                  huma            



                                                  Infection           



                                                  Site: Skin           



                                                  / Soft           



                                                  Tissue<br>           



                                                  Duration           



                                                  of             



                                                  Therapy: 7           



                                                  days<br>Re           



                                                  stricted           



                                                  use            



                                                  approved           



                                                  by: AUSTIN SPANGLER,           



                                                  ADULT ID           

 

     heparin      2022- No             5000U      5,000           [facility]



     (porcine)                                Units,           ity of



     injection      01:00: 12:21                          Subcutaneo           T

[place]



     5,000 Units      00   :15                           us, BID,           Medi

sarah



                                                  First dose           [facility]



                                                  on Wed           



                                                  8/10/22 at           



                                                  2000,           



                                                  Until           



                                                  Discontinu           



                                                  ed,            



                                                  Routine           

 

     insulin            Yes            10U       10 Units,           Unive

rs



     lispro      8-10                               Subcutaneo           ity of



     (human)      22:00:                               us, TID           [place]



     (HumaLOG      00                                 MEALS,           Medical



     U-100)                                         First dose           [facility]



     injection                                         (after           



     10 Units                                         last           



                                                  modificati           



                                                  on) on Wed           



                                                  8/10/22 at           



                                                  1700,           



                                                  Until           



                                                  Discontinu           



                                                  ed,            



                                                  Routine           

 

     insulin NPH            Yes            20U       20 Units,           U

nivers



     (HUMULIN N)      8-10                               Subcutaneo           it

y of



     injection      22:00:                               us, QPM,           [place]

[facility]



     20 Units      00                                 First dose           Medic

al



                                                  (after           [facility]



                                                  last           



                                                  modificati           



                                                  on) on Wed           



                                                  8/10/22 at           



                                                  1700,           



                                                  Until           



                                                  Discontinu           



                                                  ed,            



                                                  Routine           

 

     insulin NPH      2022- No             20U       20 Units,           

[facility]



     (HUMULIN N)      8-10 08-11                          Subcutaneo           i

ty of



     injection      22:00: 13:38                          us, QPM,           [facility]

as



     20 Units      00   :17                           First dose           Medic

al



                                                  (after           [facility]



                                                  last           



                                                  modificati           



                                                  on) on Wed           



                                                  8/10/22 at           



                                                  1700,           



                                                  Until           



                                                  Discontinu           



                                                  ed,            



                                                  Routine           

 

     HYDROmorpho            Yes            4mg       4 mg,           Unive

rs



     ne        8-10                               Oral,           ity of



     (DILAUDID)      15:00:                               TIDPRN,           [place]

[facility]



     tablet 4 mg      00                                 Starting           Medi

sar[facility]



                                                  on Wed           [facility]



                                                  8/10/22 at           



                                                  1000,           



                                                  Until           



                                                  Discontinu           



                                                  ed,            



                                                  Routine,           



                                                  Pain           



                                                  (scale           



                                                  7-10)           

 

     HYDROmorpho      2022- No             4mg       4 mg,           Univ

ers



     ne        8-10 08-11                          Oral,           ity of



     (DILAUDID)      15:00: 11:04                          TIDPRN,           [facility]

as



     tablet 4 mg      00   :00                           Starting           Medi

sarah



                                                  on Wed           [facility]



                                                  8/10/22 at           



                                                  1000,           



                                                  Until Thu           



                                                  22 at           



                                                  0604,           



                                                  Routine,           



                                                  Pain           



                                                  (scale           



                                                  7-10)           

 

     insulin NPH      2022- No             20U       20 Units,           

[facility]



     (HUMULIN N)      8-10 08-10                          Subcutaneo           i

ty of



     injection      14:00: 18:34                          us, QAM,           [facility]

as



     20 Units      00   :03                           First dose           Medic

al



                                                  (after           [facility]



                                                  last           



                                                  modificati           



                                                  on) on Wed           



                                                  8/10/22 at           



                                                  0900,           



                                                  Until           



                                                  Discontinu           



                                                  ed,            



                                                  Routine           

 

     Sliding            Yes                      Subcutaneo           Univ

ers



     Scale      8-10                               us, TID           ity of



     Insulin -      13:00:                               MEALS+HS,           [facility]

as



     Lispro      00                                 First dose           Medical



     (HumaLOG) +                                         (after           [facility]



     Fsbg                                         last           



     Testing                                         modificati           



                                                  on) on Wed           



                                                  8/10/22 at           



                                                  0800,           



                                                  Until           



                                                  Discontinu           



                                                  ed,            



                                                  Routine           

 

     Sliding                             Subcutaneo           Uni

vers



     Scale      8-10 -11                          us, TID           ity of



     Insulin -      13:00: 13:39                          MEALS+HS,           [facility]

[facility]



     Lispro      00   :23                           First dose           Medical



     (HumaLOG) +                                         (after           [facility]



     Fsbg                                         last           



     Testing                                         modificati           



                                                  on) on Wed           



                                                  8/10/22 at           



                                                  0800,           



                                                  Until           



                                                  Discontinu           



                                                  ed,            



                                                  Routine           

 

     FENTanyl PF      2022- No             25ug      25 mcg,           Un

[facility]



     (SUBLIMAZE      8-10 08-10                          Slow IV           ity o

f



     (PF))      03:00: 03:26                          Push,           [place]



     injection      00   :00                           ONCE, 1           Medical



     25 mcg                                         dose, On           [facility]



                                                  22           



                                                  at 2200,           



                                                  Routine           

 

     Sliding      2022- No                       Subcutaneo           Uni

vers



     Scale      8-09 08-10                          us, TID           ity of



     Insulin -      22:00: 12:44                          MEALS+HS,           [facility]

[facility]



     Lispro      00   :50                           First dose           Medical



     (HumaLOG) +                                         (after           [facility]



     Fsbg                                         last           



     Testing                                         modificati           



                                                  on) on 22 at           



                                                  1700,           



                                                  Until           



                                                  Discontinu           



                                                  ed,            



                                                  Routine           

 

     insulin NPH      2022- No             30U       30 Units,           

[facility]



     (HUMULIN N)      8-09 08-10                          Subcutaneo           i

ty of



     injection      22:00: 12:44                          us, QPM,           [facility]

as



     30 Units      00   :50                           First dose           Medic

al



                                                  (after           [facility]



                                                  last           



                                                  modificati           



                                                  on) on 22 at           



                                                  1700,           



                                                  Until           



                                                  Discontinu           



                                                  ed,            



                                                  Routine           

 

     FENTanyl PF      2022- No             25ug      25 mcg,           Un

[facility]



     (SUBLIMAZE                                Slow IV           ity o

f



     (PF))      17:54: 18:30                          Push,           [place]



     injection      44   :23                           Q5MIN PRN,           Medi

sar[facility]



     25 mcg                                         4 doses,           [facility]



                                                  Starting           



                                                  on 22 at           



                                                  1254,           



                                                  Until 22 at           



                                                  1330,           



                                                  Routine,           



                                                  Pain           



                                                  (scale           



                                                  7-10),           



                                                  PACU           

 

     ketamine      2022- No                       Intravenou           Un

[place]



     (KETALAR)                                s, ONCE           ity of



     injection      17:26: 17:56                          INTRA           [place]



               00   :00                           PROCEDURE,           Medical



                                                  Starting           [facility]



                                                  on 22 at           



                                                  1226,           



                                                  Until 22 at           



                                                  1256,           



                                                  Routine,           



                                                  Intra-op           

 

     clindamycin      2022- No                       IV             Unive

rs



     in 5 %                                Piggyback,           ity of



     dextrose      17:18: 17:56                          ONCE INTRA           Te

[facility]



     (CLEOCIN)      00   :00                           PROCEDURE,           Medi

sarah



     900 mg/50                                         Starting           [facility]



     mL IV                                         on Tue           



     piggyback                                         22 at           



     RTU                                          1218,           



                                                  Until 22 at           



                                                  1256,           



                                                  Administer           



                                                  over 30           



                                                  Minutes,           



                                                  Intra-op           

 

     bupivacaine      2022- No                       PRN,           Unive

rs



     (preserv                                Starting           ity of



     free) 0.5%      17:00: 17:54                          on Tue           [place]

[facility]



     (SENSORCAIN      00   :05                           22 at           Med

ical



     E MPF) 0.5                                         1200,           [facility]



     % (5 mg/mL)                                         Intra-op           



     10 mL,                                                        



     lidocaine                                                        



     1% (PF)                                                        



     (XYLOCAINE)                                                        



     10 mL                                                        

 

     phenylephri      2022- No                       Intravenou          

 [facility]



     ne                                  s, ONCE           ity of



     (VAZCULEP)      16:41: 17:56                          INTRA           [place]



     injection      00   :00                           PROCEDURE,           Medi

[facility]



                                                  Starting           [facility]



                                                  on 22 at           



                                                  1141,           



                                                  Until 22 at           



                                                  1256,           



                                                  Routine,           



                                                  Intra-op           

 

     dexmedeTOMI      2022- No                       Intravenou          

 [facility]



     Dine                                s, ONCE           ity of



     (PRECEDEX)      16:31: 17:56                          INTRA           [place]



     injection      00   :00                           PROCEDURE,           Medi

sarah



                                                  Starting           [facility]



                                                  on 22 at           



                                                  1131,           



                                                  Until 22 at           



                                                  1256,           



                                                  Routine,           



                                                  Intra-op           

 

     propofoL IV      2022- No                       IV             Unive

rs



     infusion                                Infusion,           ity o

f



               16:31: 17:56                          CONTINUOUS           [place]



               00   :00                           PRN,           Medical



                                                  Starting           [facility]



                                                  on 22 at           



                                                  1131,           



                                                  Until 22 at           



                                                  1256,           



                                                  Routine,           



                                                  Intra-op           

 

     FENTanyl PF      2022- No                       Epidural,           

[facility]



     (SUBLIMAZE                                ONCE INTRA           it

y of



     (PF))      16:31: 17:56                          PROCEDURE,           [place]



     injection      00   :00                           Starting           Medica

l



                                                  on Tue           [facility]



                                                  22 at           



                                                  1131,           



                                                  Until 22 at           



                                                  1256,           



                                                  Routine,           



                                                  Intra-op           

 

     midazolam      2022- No                       IV Push,           Uni

vers



     (VERSED)                                ONCE INTRA           ity 

of



     injection      16:31: 17:56                          PROCEDURE,           [facility]

[place]



               00   :00                           Starting           Medical



                                                  on Tue           [facility]



                                                  22 at           



                                                  1131,           



                                                  Until 22 at           



                                                  1256,           



                                                  Routine,           



                                                  Intra-op           

 

     lactated      2022- No                       IV             [facility]



     ringers IV      8-09 08-09                          Infusion,           ity

 of



     infusion      16:25: 17:56                          CONTINUOUS           [facility]

[facility]



               00   :00                           PRN,           Medical



                                                  Starting           [facility]



                                                  on 22 at           



                                                  1125,           



                                                  Until 22 at           



                                                  1256,           



                                                  Routine,           



                                                  Intra-op           

 

     insulin NPH       No             34U       34 Units,           

[facility]



     (HUMULIN N)      8-09 08-10                          Subcutaneo           i

ty of



     injection      14:00: 12:44                          us, QAM,           [facility]

as



     34 Units      00   :50                           First dose           Medic

al



                                                  (after           [facility]



                                                  last           



                                                  modificati           



                                                  on) on 22 at           



                                                  0900,           



                                                  Until           



                                                  Discontinu           



                                                  ed,            



                                                  Routine           

 

     insulin       No             14U       14 Units,           Univ

ers



     lispro      8-09 08-10                          Subcutaneo           ity of



     (human)      13:00: 12:44                          us, TID           [place]



     (HumaLOG      00   :50                           MEALS,           Medical



     U-100)                                         First dose           [facility]



     injection                                         (after           



     14 Units                                         last           



                                                  modificati           



                                                  on) on 22 at           



                                                  0800,           



                                                  Until           



                                                  Discontinu           



                                                  ed,            



                                                  Routine           

 

     insulin NPH       No             32U       32 Units,           

[facility]



     (HUMULIN N)                                Subcutaneo           i

ty of



     injection      22:00: 12:53                          us, QPM,           [facility]

as



     32 Units      00   :44                           First dose           Medic

al



                                                  (after           [facility]



                                                  last           



                                                  modificati           



                                                  on) on 22 at           



                                                  1700,           



                                                  Until           



                                                  Discontinu           



                                                  ed,            



                                                  Routine           

 

     HYDROmorpho      2022- No             4mg       4 mg,           Univ

ers



     ne        8-08 08-10                          Oral,           ity of



     (DILAUDID)      16:30: 14:59                          BIDPRN,           [facility]

as



     tablet 4 mg      00   :38                           Starting           Medi

[facility]



                                                  on 22 at           



                                                  1130,           



                                                  Until Wed           



                                                  8/10/22 at           



                                                  0959,           



                                                  Routine,           



                                                  Pain           



                                                  (scale           



                                                  7-10)           

 

     insulin NPH      2022- No             36U       36 Units,           

[facility]



     (HUMULIN N)                                Subcutaneo           i

ty of



     injection      14:00: 12:53                          us, QAM,           [facility]

as



     36 Units      00   :44                           First dose           Medic

al



                                                  (after           [facility]



                                                  last           



                                                  modificati           



                                                  on) on 22 at           



                                                  0900,           



                                                  Until           



                                                  Discontinu           



                                                  ed,            



                                                  Routine           

 

     insulin      2022- No             15U       15 Units,           Univ

ers



     lispro                                Subcutaneo           ity of



     (human)      13:30: 12:53                          us, TID           [place]



     (HumaLOG      00   :44                           MEALS,           Medical



     U-100)                                         First dose           [facility]



     injection                                         (after           



     15 Units                                         last           



                                                  modificati           



                                                  on) on 22 at           



                                                  0830,           



                                                  Until           



                                                  Discontinu           



                                                  ed,            



                                                  Routine           

 

     cholestyram            Yes                      Topical           Uni

vers



     ine-nystati                                     (Apply To           ity

 of



     n-zinc      13:00:                               Affected           [place]



     oxide      00                                 Areas),           Medical



     ointment                                         BID, First           [facility]

h



     1:1:1                                         dose           



     (COMPOUNDED                                         (after           



     )                                            last           



                                                  modificati           



                                                  on) on 22 at           



                                                  0800,           



                                                  Until           



                                                  Discontinu           



                                                  ed,            



                                                  Routine           

 

     cholestyram            Yes                      Topical           Uni

vers



     ine-nystati                                     (Apply To           ity

 of



     n-zinc      13:00:                               Affected           [place]



     oxide      00                                 Areas),           Medical



     ointment                                         BID, First           [facility]

h



     1:1:1                                         dose           



     (COMPOUNDED                                         (after           



     )                                            last           



                                                  modificati           



                                                  on) on 22 at           



                                                  0800,           



                                                  Until           



                                                  Discontinu           



                                                  ed,            



                                                  Routine           

 

     magnesium      2022- No             4g        4 g, IV           Univ

ers



     sulfate in                                Piggyback,           it

y of



     water 4      12:00: 15:57                          ONCE, 1           [place]



     gram/50 mL      00   :00                           dose, On           Medic

al



     (8 %) IV                                         22           [facility]

h



     Piggyback 4                                         at 0700,           



     g                                            Routine           

 

     eravacyclin      2022- No             1mg/kg      123 mg (1         

  [facility]



     e (XERAVA)                                mg/kg ?123           it

y of



     123 mg in      03:15: 00:10                          kg), IV           [place]

[facility]



     NaCl 0.9%      00   :03                           Infusion,           Medic

al



     (NS) 250 mL                                         Q12H ABX,           [facility]

nch



     IV infusion                                         Administer           



                                                  over 60           



                                                  Minutes,           



                                                  First dose           



                                                  on Sun           



                                                  8/7/22 at           



                                                  2215, For           



                                                  7 days           

 

     insulin NPH      2022- No             38U       38 Units,           

[facility]



     (HUMULIN N)                                Subcutaneo           i

ty of



     injection      22:00: 13:14                          us, QPM,           [facility]

as



     38 Units      00   :25                           First dose           Medic

al



                                                  (after           [facility]



                                                  last           



                                                  modificati           



                                                  on) on Sun           



                                                  8/7/22 at           



                                                  1700,           



                                                  Until           



                                                  Discontinu           



                                                  ed,            



                                                  Routine           

 

     insulin       No             18U       18 Units,           Univ

ers



     lispro                                Subcutaneo           ity of



     (human)      17:00: 13:15                          us, TID           [place]



     (HumaLOG      00   :02                           MEALS,           Medical



     U-100)                                         First dose           [facility]



     injection                                         (after           



     18 Units                                         last           



                                                  modificati           



                                                  on) on Sun           



                                                  22 at           



                                                  1200,           



                                                  Until           



                                                  Discontinu           



                                                  ed,            



                                                  Routine           

 

     insulin NPH       No             37U       37 Units,           

[facility]



     (HUMULIN N)                                Subcutaneo           i

ty of



     injection      14:00: 14:15                          us, QAM,           [facility]

as



     37 Units      00   :14                           First dose           Medic

al



                                                  (after           [facility]



                                                  last           



                                                  modificati           



                                                  on) on Sun           



                                                  22 at           



                                                  0900,           



                                                  Until           



                                                  Discontinu           



                                                  ed,            



                                                  Routine           

 

     Sliding                             Subcutaneo           Uni

vers



     Scale                                us, TID           ity of



     Insulin -      02:00: 12:53                          MEALS+HS,           [place]

[place]



     Lispro      00   :44                           First dose           Medical



     (HumaLOG) +                                         (after           [facility]



     Fsbg                                         last           



     Testing                                         modificati           



                                                  on) on Sat           



                                                  22 at           



                                                  2100,           



                                                  Until           



                                                  Discontinu           



                                                  ed,            



                                                  Routine           

 

     HYDROmorpho       No             2mg       2 mg,           Univ

ers



     ne                                  Oral,           ity of



     (DILAUDID)      00:17: 16:23                          Q8HPRN,           [facility]

as



     tablet 2 mg      00   :26                           Starting           Medi

[facility]



                                                  on Sat           [facility]



                                                  22 at           



                                                  1917,           



                                                  Until Mon           



                                                  22 at           



                                                  1123,           



                                                  Routine,           



                                                  Pain           



                                                  (scale           



                                                  7-10)           

 

     insulin      2022- No             17U       17 Units,           Univ

ers



     lispro                                Subcutaneo           ity of



     (human)      22:00: 14:15                          us, TID           [place]



     (HumaLOG      00   :14                           MEALS,           Medical



     U-100)                                         First dose           [facility]



     injection                                         (after           



     17 Units                                         last           



                                                  modificati           



                                                  on) on Sat           



                                                  22 at           



                                                  1700,           



                                                  Until           



                                                  Discontinu           



                                                  ed,            



                                                  Routine           

 

     insulin NPH      2022- No             35U       35 Units,           

[facility]



     (HUMULIN N)                                Subcutaneo           i

ty of



     injection      22:00: 13:38                          us, QPM,           [facility]

as



     35 Units      00   :53                           First dose           Medic

al



                                                  (after           [facility]



                                                  last           



                                                  modificati           



                                                  on) on Sat           



                                                  22 at           



                                                  1700,           



                                                  Until           



                                                  Discontinu           



                                                  ed,            



                                                  Routine           

 

     Sliding                             Subcutaneo           Uni

vers



     Scale       08-06                          us, Q4H,           ity of



     Insulin -      21:00: 22:06                          First dose           [facility]

[place]



     Lispro      00   :35                           (after           Medical



     (HumaLOG) +                                         last           [facility]



     Fsbg                                         modificati           



     Testing                                         on) on Sat           



                                                  22 at           



                                                  1600,           



                                                  Until           



                                                  Discontinu           



                                                  ed,            



                                                  Routine           

 

     HYDROmorpho       No             4mg       4 mg,           Univ

ers



     ne         08-07                          Oral,           ity of



     (DILAUDID)      17:24: 00:17                          Q8HPRN,           [facility]

as



     tablet 4 mg      27   :12                           Starting           Medi

[facility]



                                                  on Sat           [facility]



                                                  22 at           



                                                  1224,           



                                                  Until Sat           



                                                  22 at           



                                                  1917,           



                                                  Routine,           



                                                  Pain           



                                                  (scale           



                                                  7-10)           

 

     sodium            Yes                      Topical,           [facility]



     hypochlorit      8-06                               DAILY,           ity of



     e 0.25%      14:00:                               First dose           [place]

[facility]



     (DAKIN'S      00                                 on Sat           Medical



     SOLUTION)                                         22 at           [facility]

h



     solution                                         0900,           



                                                  Until           



                                                  Discontinu           



                                                  ed, ASAP           

 

     sodium            Yes                      Topical,           [facility]



     hypochlorit      8-06                               DAILY,           ity of



     e 0.25%      14:00:                               First dose           [place]

[facility]



     (DAKIN'S      00                                 on Sat           Medical



     SOLUTION)                                         22 at           [facility]

h



     solution                                         0900,           



                                                  Until           



                                                  Discontinu           



                                                  ed, ASAP           

 

     insulin       No             12U       12 Units,           Univ

ers



     lispro      -                          Subcutaneo           ity of



     (human)      14:00: 19:55                          us, TIDPC,           [facility]

as



     (HumaLOG      00   :31                           First dose           Medic

al



     U-100)                                         (after           [facility]



     injection                                         last           



     12 Units                                         modificati           



                                                  on) on Sat           



                                                  22 at           



                                                  0900,           



                                                  Until           



                                                  Discontinu           



                                                  ed,            



                                                  Routine           

 

     insulin NPH       No             26U       26 Units,           

[facility]



     (HUMULIN N)       08-                          Subcutaneo           i

ty of



     injection      14:00: 19:55                          us, QAM,           [facility]

as



     26 Units      00   :31                           First dose           Medic

al



                                                  (after           [facility]



                                                  last           



                                                  modificati           



                                                  on) on Sat           



                                                  22 at           



                                                  0900,           



                                                  Until           



                                                  Discontinu           



                                                  ed,            



                                                  Routine           

 

     Sliding      2022- No                       Subcutaneo           Uni

vers



     Scale                                us, TID           ity of



     Insulin -      13:00: 19:55                          MEALS+HS,           [facility]

[facility]



     Lispro      00   :31                           First dose           Medical



     (HumaLOG) +                                         on Sat           [facility]



     Fsbg                                         22 at           



     Testing                                         0800,           



                                                  Until           



                                                  Discontinu           



                                                  ed,            



                                                  Routine           

 

     glucagon            Yes            1mg       1 mg,           [facility]



     (GLUCAGEN                                     Intramuscu           ity 

of



     DIAGNOSTIC      12:22:                               lar, PRN,           [facility]

[place]



     KIT)      35                                 Starting           Medical



     injection 1                                         on Sat           [facility]



     mg                                           22 at           



                                                  0722,           



                                                  Until           



                                                  Discontinu           



                                                  ed, ASAP,           



                                                  Blood           



                                                  Glucose           



                                                  &amp;lt;           



                                                  or = 70           



                                                  mg/dL and           



                                                  patient is           



                                                  unable to           



                                                  swallow or           



                                                  has mental           



                                                  changes.           

 

     dextrose 50      0      Yes            25mL      25 mL,           Univ

ers



     % in water                                     Slow IV           ity of



     (D50W)      12:22:                               Push, PRN,           [place]



     injection      35                                 Starting           [facility]

l



     25 mL                                         on Sat           [facility]



                                                  22 at           



                                                  0722,           



                                                  Until           



                                                  Discontinu           



                                                  ed, ASAP,           



                                                  Blood           



                                                  Glucose           



                                                  &amp;lt;           



                                                  or = 70           



                                                  mg/dL and           



                                                  patient is           



                                                  unable to           



                                                  swallow or           



                                                  has mental           



                                                  status           



                                                  changes.           

 

     glucagon            Yes            1mg       1 mg,           [facility]



     (GLUCAGEN                                     Intramuscu           ity 

of



     DIAGNOSTIC      12:22:                               lar, PRN,           [facility]

[place]



     KIT)      35                                 Starting           Medical



     injection 1                                         on Sat           Branch



                                                22 at           



                                                  0722,           



                                                  Until           



                                                  Discontinu           



                                                  ed, ASAP,           



                                                  Blood           



                                                  Glucose           



                                                  &amp;lt;           



                                                  or = 70           



                                                  mg/dL and           



                                                  patient is           



                                                  unable to           



                                                  swallow or           



                                                  has mental           



                                                  changes.           

 

     dextrose 50      0      Yes            25mL      25 mL,           Un[facility]

ers



     % in water                                     Slow IV           ity of



     (D50W)      12:22:                               Push, PRN,           [place]



     injection      35                                 Starting           [facility]

l



     25 mL                                         on Sat           Br[facility]



                                                  22 at           



                                                  0722,           



                                                  Until           



                                                  Discontinu           



                                                  ed, ASAP,           



                                                  Blood           



                                                  Glucose           



                                                  &amp;lt;           



                                                  or = 70           



                                                  mg/dL and           



                                                  patient is           



                                                  unable to           



                                                  swallow or           



                                                  has mental           



                                                  status           



                                                  changes.           

 

     HYDROmorpho      2022- No             .2mg      0.2 mg,           Un

[place]



     ne                                  Slow IV           ity of



     (DILAUDID)      01:49: 02:17                          Push, PRN,           

[place]



     injection      34   :00                           1 dose,           Medical



     0.2 mg                                         Starting           Branch



                                                  on 22 at           



                                                  204,           



                                                  Until 22 at           



                                                  ,           



                                                  Routine,           



                                                  Pain           



                                                  (scale           



                                                  7-10)<br>U           



                                                  se             



                                                  approved           



                                                  by             



                                                  (Faculty):           



                                                  [facility]           



                                                  CARE UNIT           

 

     KCL 20       No             40meq      40 mEq,           Univer

s



     mEq/15 mL                                Oral,           ity of



     solution 40      23:30: 22:58                          ONCE, 1           [facility]

[place]



     mEq       00   :00                           dose, On           Medical



                                                  22           Branch



                                                  at 1830,           



                                                  Routine           

 

     Sliding                             Subcutaneo           Uni

vers



     Scale                                us, Q4H,           ity of



     Insulin -      22:15: 12:23                          First dose           [facility]

[place]



     Lispro      00   :48                           on Fri           Medical



     (HumaLOG) +                                         22 at           [facility]



     Fsbg                                         1715,           



     Testing                                         Until           



                                                  Discontinu           



                                                  ed,            



                                                  Routine           

 

     insulin NPH       No             22U       22 Units,           

[facility]



     (HUMULIN N)                                Subcutaneo           i

ty of



     injection      22:00: 19:55                          us, QPM,           [facility]

as



     22 Units      00   :31                           First dose           Medic

al



                                                  (after           Branch



                                                  last           



                                                  modificati           



                                                  on) on 22 at           



                                                  1700,           



                                                  Until           



                                                  Discontinu           



                                                  ed,            



                                                  Routine           

 

     magnesium       No             4g        4 g, IV           Univ

ers



     sulfate in                                Piggyback,           it

y of



     water 4      19:15: 20:22                          ONCE, 1           [place]



     gram/50 mL      00   :00                           dose, On           Medic

al



     (8 %) IV                                         22           Branc

h



     Piggyback 4                                         at 1415,           



     g                                            Routine           

 

     HYDROmorpho      2022- No             .2mg      0.2 mg,           Un

[place]



     ne                                  Slow IV           ity of



     (DILAUDID)      18:00: 18:07                          Push,           [place]



     injection      00   :00                           ONCE, 1           Medical



     0.2 mg                                         dose, On           Br[facility]



                                                  22           



                                                  at 1300,           



                                                  Routine<br           



                                                  >Use           



                                                  approved           



                                                  by             



                                                  (Faculty):           



                                                  [facility]ENS[facility]           



                                                  CARE UNIT           

 

     insulin      2022- No             6U        6 Units,           Unive

rs



     lispro                                Subcutaneo           ity of



     (human)      14:00: 00:41                          us, TIDPC,           [facility]

as



     (HumaLOG      00   :23                           First dose           Medic

al



     U-100)                                         (after           Branch



     injection 6                                         last           



     Units                                         modificati           



                                                  on) on 22 at           



                                                  0900,           



                                                  Until           



                                                  Discontinu           



                                                  ed,            



                                                  Routine           

 

     insulin NPH      2022- No             22U       22 Units,           

[facility]



     (HUMULIN N)                                Subcutaneo           i

ty of



     injection      14:00: 21:37                          us, QAM,           [facility]

as



     22 Units      00   :09                           First dose           Medic

al



                                                  on Fri           Branch



                                                  22 at           



                                                  0900,           



                                                  Until           



                                                  Discontinu           



                                                  ed,            



                                                  Routine           

 

     HYDROmorpho       No             .2mg      0.2 mg,           Un

[place]



     ne                                  Slow IV           ity of



     (DILAUDID)      08:15: 07:57                          Push,           [place]



     injection      00   :00                           ONCE, 1           Medical



     0.2 mg                                         dose, On           Branch



                                                  22           



                                                  at 0315,           



                                                  Routine<br           



                                                  >Use           



                                                  approved           



                                                  by             



                                                  (Faculty):           



                                                  INTENS[facility]           



                                                  CARE UNIT           

 

     HYDROcodone      2022- No             1{tbl}      1 tablet,         

  [facility]



     -acetaminop                                Oral,           ity of



     hen (NORCO)      01:32: 17:25                          Q6HPRN,           [facility]

[place]



      mg      23   :31                           Starting           Medica

l



     tablet 1                                         on Thu           Br[facility]



     tablet                                         22 at           



                                                  2032,           



                                                  Until Sat           



                                                  22 at           



                                                  1225,           



                                                  Routine,           



                                                  Pain           



                                                  (scale           



                                                  7-10)           

 

     Sliding                             Subcutaneo           Uni

vers



     Scale                                us, Q4H,           ity of



     Insulin -      01:00: 21:37                          First dose           [facility]

[place]



     lispro      00   :09                           on u           Medical



     (humaLOG) +                                         22 at           [facility]



     Fsbg                                         ,           



     Testing                                         Until           



                                                  Discontinu           



                                                  ed,            



                                                  Routine           

 

     meropenem       No             1000mg      1,000 mg,           

[facility]



     (MERREM)                                IV             ity of



     1,000 mg in      00:15: 02:14                          Piggyback,          

 [place]



     NaCl 0.9%      00   :44                           Q8H ABX,           Medica

l



     (NS) 50 mL                                         21 doses,           [facility]

ch



     MINI-BAG                                         First dose           



                                                  on u           



                                                  22 at           



                                                  1915, Last           



                                                  dose on           



                                                  u            



                                                  22 at           



                                                  1115,           



                                                  Administer           



                                                  over 3           



                                                  Hours, 50           



                                                  mL<br>Rest           



                                                  ricted use           



                                                  approved           



                                                  by: [facility]           



                                                  8TH            



                                                  FLOOR<br>R           



                                                  [place] for           



                                                  Anti-Infec           



                                                  tive:           



                                                  Documented           



                                                  Infection<           



                                                  br>Documen           



                                                  huma            



                                                  Infection           



                                                  Site:           



                                                  Urine<br>D           



                                                  uration of           



                                                  Therapy: 7           



                                                  days           

 

     HYDROmorpho      2022- No             .2mg      0.2 mg,           Un

[place]



     ne                                  Slow IV           ity of



     (DILAUDID)      00:00: 23:13                          Push,           [place]



     injection      00   :00                           ONCE, 1           Medical



     0.2 mg                                         dose, On           Branch



                                                  Thu 22           



                                                  at 1900,           



                                                  Routine<br           



                                                  >Use           



                                                  approved           



                                                  by             



                                                  (Faculty):           



                                                  INTENSIVE           



                                                  CARE UNIT           

 

     HYDROmorpho      2022- No             .2mg      0.2 mg,           Un

[place]



     ne                                  Slow IV           ity of



     (DILAUDID)      19:30: 18:59                          Push,           [place]



     injection      00   :00                           ONCE, 1           Medical



     0.2 mg                                         dose, On           Branch



                                                  Thu 22           



                                                  at 1430,           



                                                  Routine<br           



                                                  >Use           



                                                  approved           



                                                  by             



                                                  (Faculty):           



                                                  INTENSIVE           



                                                  CARE UNIT           

 

     cholestyram      2022- No                       Topical           Un

[place]



     ine-nystati                                (Apply To           it

y of



     n-zinc      18:29: 12:46                          Affected           [place]



     oxide      12   :35                           Areas),           Medical



     ointment                                         PRN,           Branch



     1:1:1                                         Starting           



     (COMPOUNDED                                         on u           



     )                                            22 at           



                                                  1329,           



                                                  Until Mon           



                                                  22 at           



                                                  0746,           



                                                  Routine,           



                                                  Wound           



                                                  care,           



                                                  Cuts,           



                                                  abrasions,           



                                                  and skin           



                                                  ulcers           

 

     iopamidol      2022- No        [id number] 60mL      60 mL,           

[facility]



     (ISOVUE                                Intravenou           ity o

f



     370-500 mL)      17:25: 05:25                          s, ONCE, 1          

 [place]



     injection      00   :00                           dose, On           Medica

l



     60 mL                                         Thu 22           Branch



                                                  at 1230,           



                                                  Routine           

 

     meropenem      2022- No             1000mg      1,000 mg,           

[facility]



     (MERREM)                                IV             ity of



     1,000 mg in      16:45: 20:14                          Piggyback,          

 [place]



     NaCl 0.9%      00   :00                           ONCE, 1           Medical



     (NS) 50 mL                                         dose, On           [facility]

h



     MINI-BAG                                         u 22           



                                                  at 1145,           



                                                  Administer           



                                                  over 30           



                                                  Minutes,           



                                                  50             



                                                  mL<br&gt;R           



                                                  estricted           



                                                  use            



                                                  approved           



                                                  by: [facility]           



                                                  8TH            



                                                  FLOOR<br>R           



                                                  [place] for           



                                                  Anti-Infec           



                                                  tive:           



                                                  Documented           



                                                  Infection<           



                                                  br>Documen           



                                                  huma            



                                                  Infection           



                                                  Site:           



                                                  Urine<br>D           



                                                  uration of           



                                                  Therapy: 7           



                                                  days           

 

     pantoprazol      -0      Yes            40mg      40 mg,           Univ

ers



     e                                        Oral,           ity of



     (PROTONIX)      14:00:                               DAILY,           [place]



     EC tablet      00                                 First dose           Medi

[place]



     40 mg                                         on u           Branch



                                                  22 at           



                                                  0900,           



                                                  Until           



                                                  Discontinu           



                                                  ed,            



                                                  Routine<br           



                                                  >Indicatio           



                                                  n for use:           



                                                  None of           



                                                  the above           

 

     pantoprazol      -0      Yes            40mg      40 mg,           [facility]

ers



     e                                        Oral,           ity of



     (PROTONIX)      14:00:                               DAILY,           [place]



     EC tablet      00                                 First dose           Medi

sarah



     40 mg                                         on u           [facility]



                                                  22 at           



                                                  0900,           



                                                  Until           



                                                  Discontinu           



                                                  ed,            



                                                  Routine<br           



                                                  >Indicatio           



                                                  n for use:           



                                                  None of           



                                                  the above           

 

     heparin      -0      Yes            5000U      5,000           [facility]



     (porcine)                                     Units,           ity of



     injection      13:00:                               Subcutaneo           [facility]

[place]



     5,000 Units      00                                 us, Q12H,           Med

ical



                                                  First dose           [facility]



                                                  on u           



                                                  22 at           



                                                  0800,           



                                                  Until           



                                                  Discontinu           



                                                  ed,            



                                                  Routine           

 

     heparin      -0 - No             5000U      5,000           [facility]



     (porcine)       08-10                          Units,           ity of



     injection      13:00: 21:57                          Subcutaneo           [facility]

[place]



     5,000 Units      00   :33                           us, Q12H,           Med

ical



                                                  First dose           [facility]



                                                  on u           



                                                  22 at           



                                                  0800,           



                                                  Until           



                                                  Discontinu           



                                                  ed,            



                                                  Routine           

 

     NaCl 0.9%      -0      Yes            10mL      10 mL,           Univer

s



     (NS)      8                               Slow IV           ity of



     injection      11:28:                               Push, PRN,           [facility]

[place]



     10 mL      11                                 Starting           Medical



                                                  on u           [facility]



                                                  22 at           



                                                  0628,           



                                                  Until           



                                                  Discontinu           



                                                  ed,            



                                                  Routine,           



                                                  line           



                                                  maintenanc           



                                                  e              

 

     lidocaine      -0      Yes            5mL       5 mL,           [facility]



     1% (PF)                                     Subcutaneo           ity of



     (XYLOCAINE)      11:28:                               us, PRN,           [facility]

[place]



     injection 5      11                                 Starting           Medi

sarah



     mL                                           on Thu           [facility]



                                                  22 at           



                                                  0628,           



                                                  Until           



                                                  Discontinu           



                                                  ed,            



                                                  Routine,           



                                                  Local           



                                                  anesthesia           

 

     NaCl 0.9%            Yes            10mL      10 mL,           Univer

s



     (NS)                                     Slow IV           ity of



     injection      11:28:                               Push, PRN,           Te

[place]



     10 mL      11                                 Starting           Medical



                                                  on u           [facility]



                                                  22 at           



                                                  0628,           



                                                  Until           



                                                  Discontinu           



                                                  ed,            



                                                  Routine,           



                                                  line           



                                                  maintenanc           



                                                  e              

 

     lidocaine            Yes            5mL       5 mL,           [facility]



     1% (PF)                                     Subcutaneo           ity of



     (XYLOCAINE)      11:28:                               us, PRN,           [facility]

[facility]



     injection 5      11                                 Starting           Medi

sarha



     mL                                           on Thu           [facility]



                                                  22 at           



                                                  0628,           



                                                  Until           



                                                  Discontinu           



                                                  ed,            



                                                  Routine,           



                                                  Local           



                                                  anesthesia           

 

     NaCl 0.9%      2022- No             1000mL      at 999           Uni

vers



     (NS) bolus                                mL/hr,           ity of



     infusion      07:30: 06:55                          1,000 mL,           [facility]

as



     1,000 mL      00   :00                           IV             Medical



                                                  Infusion,           [facility]



                                                  ONCE, 1           



                                                  dose, On           



                                                  u 22           



                                                  at 0230,           



                                                  ASAP           

 

     FENTanyl PF       No             50ug      50 mcg,           Un

[facility]



     (SUBLIMAZE                                Slow IV           ity o

f



     (PF))      06:45: 06:01                          Push,           [place]



     injection      00   :00                           ONCE, 1           Medical



     50 mcg                                         dose, On           [facility]



                                                  u 22           



                                                  at 0145,           



                                                  Routine           

 

     cefTRIAXone      2022- No             1000mg      1,000 mg,         

  [facility]



     (ROCEPHIN)                                IV             ity of



     1,000 mg in      05:30: 06:00                          PigNorco, [place]



     NaCl 0.9%      00   :00                           ONCE, 1           Medical



     (NS) 50 mL                                         dose, On           [facility]

h



     MINI-BAG                                         u 22           



                                                  at 0030,           



                                                  Administer           



                                                  over 30           



                                                  Minutes,           



                                                  50             



                                                  mL<br&gt;R           



                                                  elmira for           



                                                  Anti-Infec           



                                                  tive:           



                                                  Empiric           



                                                  Therapy           



                                                  for            



                                                  Suspected           



                                                  Infection<           



                                                  br>Empiric           



                                                  Therapy           



                                                  Site:           



                                                  Urine<br>D           



                                                  uration of           



                                                  therapy:           



                                                  72 hours           

 

     NaCl 0.9%      0 2- No             1000mL      at 999           Uni

vers



     (NS) bolus       08-04                          mL/hr,           ity of



     infusion      05:30: 07:58                          1,000 mL,           [facility]

as



     1,000 mL      00   :00                           IV             Medical



                                                  Infusion,           [facility]



                                                  ONCE, 1           



                                                  dose, On           



                                                  Thu 22           



                                                  at 0030,           



                                                  ASAP           

 

     NaCl 0.9%      2022- No             1000mL      at 999           Uni

vers



     (NS) bolus       08-04                          mL/hr,           ity of



     infusion      05:15: 07:58                          1,000 mL,           [facility]

as



     1,000 mL      00   :00                           IV             Medical



                                                  Infusion,           [facility]



                                                  ONCE, 1           



                                                  dose, On           



                                                  Thu 22           



                                                  at 0015,           



                                                  ASAP           

 

     D5W 0.45%      2022- No                       IV             [facility]



     NaCl      -                          Infusion,           ity of



     (1/2NS) 1 L      04:53: 22:22                          at 200           [facility]

as



     + KCL 20      49   :19                           mL/hr, PRN           Medic

al



     mEq                                          - SEE           [facility]



                                                  INSTRUCTIO           



                                                  NS,            



                                                  Starting           



                                                  on Wed           



                                                  8/3/22 at           



                                                  2353,           



                                                  Until 22 at           



                                                  1722,           



                                                  ASAP,           



                                                  Blood           



                                                  glucose           



                                                  control           

 

     proMETHazin      2022- No             25mg      25 mg, IV           

[facility]



     e                                   Piggyback,           ity of



     (PHENERGAN)      04:15: 04:22                          ONCE, 1           Te

[place]



     25 mg in      00   :00                           dose, On           Medical



     NaCl 0.9%                                         Wed 8/3/22           [facility]

ch



     (NS) 50 mL                                         at 2315,           



     IV                                           ASAP           



     piggyback                                                        

 

     proMETHazin      2022- No             12.5mg      12.5 mg,          

 [facility]



     e          07-30                          IV             ity of



     (PHENERGAN)      21:30: 22:14                          Piggyback,          

 [place]



     12.5 mg in      00   :00                           ONCE, 1           Medica

l



     NaCl 0.9%                                         dose, On           [facility]



     (NS) 50 mL                                         Sat            



     IV                                           22 at           



     piggyback                                         1630, 50           



                                                  mL             

 

     lactated            Yes            1000mL      at 50           Univer

s



     ringers IV      7-30                               mL/hr,           ity of



     infusion      19:15:                               1,000 mL,           [place]

[place]



     1,000 mL      00                                 IV             Medical



                                                  Infusion,           [facility]



                                                  CONTINUOUS           



                                                  , Starting           



                                                  on Sat           



                                                  22 at           



                                                  1415,           



                                                  Until           



                                                  Discontinu           



                                                  ed,            



                                                  Routine           

 

     insulin      2022-0      Yes            7U        7 Units,           Univer

s



     lispro      7-30                               Subcutaneo           ity of



     (human)      17:00:                               us, TID           [place]



     (HumaLOG      00                                 MEALS,           Medical



     U-100)                                         First dose           [facility]



     injection 7                                         (after           



     Units                                         last           



                                                  modificati           



                                                  on) on Sat           



                                                  22 at           



                                                  1200,           



                                                  Until           



                                                  Discontinu           



                                                  ed,            



                                                  Routine           

 

     insulin            Yes            40U       40 Units,           Unive

rs



     glargine      7-30                               Subcutaneo           ity o

f



     (LANTUS      14:00:                               us, DAILY,           [place]

[facility]



     U-100)      00                                 First dose           Medical



     injection                                         (after           [facility]



     40 Units                                         last           



                                                  modificati           



                                                  on) on Sat           



                                                  22 at           



                                                  0900,           



                                                  Until           



                                                  Discontinu           



                                                  ed,            



                                                  Routine           

 

     insulin NPH      2022- No        [id number] 50U       inject 50      

     [facility]



     and regular      7-30 08-30                          Units           ity of



     human 70-30      00:00: 04:59                          under the           

[place]



     100 unit/mL      00   :00                           skin every           Me

dical



     (70-30)                                         morning           [facility]



     injection                                         and            



                                                  evening           



                                                  for 30           



                                                  days.           

 

     proMETHazin      2022- No        [id number] 12.5mg      Insert 1     

      [facility]



     e 12.5 mg      7-30 08-30                          Suppositor           ity

 of



     suppository      00:00: 04:59                          y into           [facility]

as



               00   :00                           rectum           Medical



                                                  every 6           [facility]



                                                  (six)           



                                                  hours as           



                                                  needed for           



                                                  Nausea and           



                                                  Vomiting           



                                                  (N/V) for           



                                                  up to 30           



                                                  days.           

 

     proMETHazin      2022- No        [id number] 12.5mg      Take 0.5     

      [facility]



     e 25 mg      7-30 08-30                          tablets by           ity o

f



     tablet      00:00: 04:59                          mouth           [place]



               00   :00                           every 4           Medical



                                                  (four)           [facility]



                                                  hours as           



                                                  needed for           



                                                  Nausea and           



                                                  Vomiting           



                                                  (N/V) for           



                                                  up to 30           



                                                  days.           

 

     insulin NPH      2022- No        [id number] 50U       inject 50      

     [facility]



     and regular      7-30 08-30                          Units           ity of



     human 70-30      00:00: 04:59                          under the           

[place]



     100 unit/mL      00   :00                           skin every           Me

dical



     (70-30)                                         morning           [facility]



     injection                                         and            



                                                  evening           



                                                  for 30           



                                                  days.           

 

     proMETHazin      2022- No        [id number] 12.5mg      Insert 1     

      [facility]



     e 12.5 mg      7-30 08-30                          Suppositor           ity

 of



     suppository      00:00: 04:59                          y into           [facility]

as



               00   :00                           rectum           Medical



                                                  every 6           [facility]



                                                  (six)           



                                                  hours as           



                                                  needed for           



                                                  Nausea and           



                                                  Vomiting           



                                                  (N/V) for           



                                                  up to 30           



                                                  days.           

 

     proMETHazin      2022- No        [id number] 12.5mg      Take 0.5     

      [facility]



     e 25 mg      7-30 08-30                          tablets by           ity o

f



     tablet      00:00: 04:59                          mouth           [place]



               00   :00                           every 4           Medical



                                                  (four)           [facility]



                                                  hours as           



                                                  needed for           



                                                  Nausea and           



                                                  Vomiting           



                                                  (N/V) for           



                                                  up to 30           



                                                  days.           

 

     insulin NPH      2022- No        [id number] 50U       inject 50      

     [facility]



     and regular      7-30 08-30                          Units           ity of



     human 70-30      00:00: 04:59                          under the           

[place]



     100 unit/mL      00   :00                           skin every           Me

dical



     (70-30)                                         morning           [facility]



     injection                                         and            



                                                  evening           



                                                  for 30           



                                                  days.           

 

     proMETHazin      2022- No        [id number] 12.5mg      Insert 1     

      [facility]



     e 12.5 mg      7-30 08-30                          Suppositor           ity

 of



     suppository      00:00: 04:59                          y into           [facility]

as



               00   :00                           rectum           Medical



                                                  every 6           [facility]



                                                  (six)           



                                                  hours as           



                                                  needed for           



                                                  Nausea and           



                                                  Vomiting           



                                                  (N/V) for           



                                                  up to 30           



                                                  days.           

 

     proMETHazin      2022- No        [id number] 12.5mg      Take 0.5     

      [facility]



     e 25 mg      7-30 08-30                          tablets by           ity o

f



     tablet      00:00: 04:59                          mouth           [place]



               00   :00                           every 4           Medical



                                                  (four)           Branch



                                                  hours as           



                                                  needed for           



                                                  Nausea and           



                                                  Vomiting           



                                                  (N/V) for           



                                                  up to 30           



                                                  days.           

 

     insulin NPH      2022- No        [id number] 50U       inject 50      

     [facility]



     and regular      7-30 08-30                          Units           ity of



     human 70-30      00:00: 04:59                          under the           

[place]



     100 unit/mL      00   :00                           skin every           Me

dical



     (70-30)                                         morning           Branch



     injection                                         and            



                                                  evening           



                                                  for 30           



                                                  days.           

 

     proMETHazin      2022- No        [id number] 12.5mg      Insert 1     

      [facility]



     e 12.5 mg      7-30 08-30                          Suppositor           ity

 of



     suppository      00:00: 04:59                          y into           [facility]

as



               00   :00                           rectum           Medical



                                                  every 6           [facility]anch



                                                  (six)           



                                                  hours as           



                                                  needed for           



                                                  Nausea and           



                                                  Vomiting           



                                                  (N/V) for           



                                                  up to 30           



                                                  days.           

 

     proMETHazin      2022- No        [id number] 12.5mg      Take 0.5     

      [facility]



     e 25 mg      7-30 08-30                          tablets by           ity o

f



     tablet      00:00: 04:59                          mouth           [place]



               00   :00                           every 4           Medical



                                                  (four)           Branch



                                                  hours as           



                                                  needed for           



                                                  Nausea and           



                                                  Vomiting           



                                                  (N/V) for           



                                                  up to 30           



                                                  days.           

 

     insulin NPH      2022- No        [id number] 50U       inject 50      

     [facility]



     and regular      7-30 08-30                          Units           ity of



     human 70-30      00:00: 04:59                          under the           

[place]



     100 unit/mL      00   :00                           skin every           Me

dical



     (70-30)                                         morning           [facility]



     injection                                         and            



                                                  evening           



                                                  for 30           



                                                  days.           

 

     proMETHazin      2022- No        [id number] 12.5mg      Insert 1     

      [facility]



     e 12.5 mg      7-30 08-30                          Suppositor           ity

 of



     suppository      00:00: 04:59                          y into           [facility]

as



               00   :00                           rectum           Medical



                                                  every 6           [facility]



                                                  (six)           



                                                  hours as           



                                                  needed for           



                                                  Nausea and           



                                                  Vomiting           



                                                  (N/V) for           



                                                  up to 30           



                                                  days.           

 

     proMETHazin      2022- No        [id number] 12.5mg      Take 0.5     

      [facility]



     e 25 mg      7-30 08-30                          tablets by           ity o

f



     tablet      00:00: 04:59                          mouth           [place]



               00   :00                           every 4           Medical



                                                  (four)           Branch



                                                  hours as           



                                                  needed for           



                                                  Nausea and           



                                                  Vomiting           



                                                  (N/V) for           



                                                  up to 30           



                                                  days.           

 

     proMETHazin      2022- No        [id number] 12.5mg      Insert 1     

      [facility]



     e 12.5 mg      7-30 08-30                          Suppositor           ity

 of



     suppository      00:00: 04:59                          y into           [facility]

as



               00   :00                           rectum           Medical



                                                  every 6           [facility]anch



                                                  (six)           



                                                  hours as           



                                                  needed for           



                                                  Nausea and           



                                                  Vomiting           



                                                  (N/V) for           



                                                  up to 30           



                                                  days.           

 

     proMETHazin      2022- No        [id number] 12.5mg      Take 0.5     

      [facility]



     e 25 mg      7-30 08-30                          tablets by           ity o

f



     tablet      00:00: 04:59                          mouth           [place]



               00   :00                           every 4           Medical



                                                  (four)           Branch



                                                  hours as           



                                                  needed for           



                                                  Nausea and           



                                                  Vomiting           



                                                  (N/V) for           



                                                  up to 30           



                                                  days.           

 

     proMETHazin      2022- No        [id number] 12.5mg      Insert 1     

      [facility]



     e 12.5 mg      7-30 08-30                          Suppositor           ity

 of



     suppository      00:00: 04:59                          y into           [facility]

as



               00   :00                           rectum           Medical



                                                  every 6           Branch



                                                  (six)           



                                                  hours as           



                                                  needed for           



                                                  Nausea and           



                                                  Vomiting           



                                                  (N/V) for           



                                                  up to 30           



                                                  days.           

 

     proMETHazin      2022- No        [id number] 12.5mg      Take 0.5     

      [facility]



     e 25 mg      7-30 08-30                          tablets by           ity o

f



     tablet      00:00: 04:59                          mouth           [place]



               00   :00                           every 4           Medical



                                                  (four)           Branch



                                                  hours as           



                                                  needed for           



                                                  Nausea and           



                                                  Vomiting           



                                                  (N/V) for           



                                                  up to 30           



                                                  days.           

 

     proMETHazin      2022- No        [id number] 12.5mg      Insert 1     

      [facility]



     e 12.5 mg      7-30 08-30                          Suppositor           ity

 of



     suppository      00:00: 04:59                          y into           [facility]

as



               00   :00                           rectum           Medical



                                                  every 6           [facility]



                                                  (six)           



                                                  hours as           



                                                  needed for           



                                                  Nausea and           



                                                  Vomiting           



                                                  (N/V) for           



                                                  up to 30           



                                                  days.           

 

     proMETHazin      2022- No        [id number] 12.5mg      Take 0.5     

      [facility]



     e 25 mg      7-30 08-30                          tablets by           ity o

f



     tablet      00:00: 04:59                          mouth           [place]



               00   :00                           every 4           Medical



                                                  (four)           Branch



                                                  hours as           



                                                  needed for           



                                                  Nausea and           



                                                  Vomiting           



                                                  (N/V) for           



                                                  up to 30           



                                                  days.           

 

     proMETHazin      2022- No        [id number] 12.5mg      Insert 1     

      [facility]



     e 12.5 mg      7-30 08-30                          Suppositor           ity

 of



     suppository      00:00: 04:59                          y into           [facility]

as



               00   :00                           rectum           Medical



                                                  every 6           [facility]anch



                                                  (six)           



                                                  hours as           



                                                  needed for           



                                                  Nausea and           



                                                  Vomiting           



                                                  (N/V) for           



                                                  up to 30           



                                                  days.           

 

     proMETHazin      2022- No        [id number] 12.5mg      Take 0.5     

      [facility]



     e 25 mg      7-30 08-30                          tablets by           ity o

f



     tablet      00:00: 04:59                          mouth           [place]



               00   :00                           every 4           Medical



                                                  (four)           Branch



                                                  hours as           



                                                  needed for           



                                                  Nausea and           



                                                  Vomiting           



                                                  (N/V) for           



                                                  up to 30           



                                                  days.           

 

     insulin NPH      2022- No        [id number] 50U       inject 50      

     [facility]



     and regular      7-30 08-12                          Units           ity of



     human 70-30      00:00: 00:00                          under the           

[place]



     100 unit/mL      00   :00                           skin every           Me

dical



     (70-30)                                         morning           Branch



     injection                                         and            



                                                  evening           



                                                  for 30           



                                                  days.           

 

     insulin      2022- No             5U        5 Units,           Unive

rs



     lispro                                Subcutaneo           ity of



     (human)      17:00: 14:45                          us, TID           [place]



     (HumaLOG      00   :04                           MEALS,           Medical



     U-100)                                         First dose           [facility]



     injection 5                                         (after           



     Units                                         last           



                                                  modificati           



                                                  on) on 22 at           



                                                  1200,           



                                                  Until           



                                                  Discontinu           



                                                  ed,            



                                                  Routine           

 

     HYDROmorphO      2022- No             1mg       1 mg,           Univ

ers



     ne                                  Intravenou           ity of



     (DILAUDID)      16:54: 16:53                          s, Q6HPRN,           

[place]



     injection 1      28   :28                           Starting           Medi

[facility]



     mg                                           on 22 at           



                                                  1154,           



                                                  Until Sun           



                                                  22 at           



                                                  1153,           



                                                  Routine,           



                                                  Break[place]           



                                                  gh pain           



                                                  only<br>Us           



                                                  e approved           



                                                  by             



                                                  (Faculty):           



                                                  ADC            



                                                  PROVIDER           

 

     insulin      2022- No             35U       35 Units,           Univ

ers



     glargine                                Subcutaneo           ity 

of



     (LANTUS      14:00: 14:02                          us, DAILY,           [facility]

as



     U-100)      00   :21                           First dose           Medical



     injection                                         (after           [facility]



     35 Units                                         last           



                                                  modificati           



                                                  on) on 22 at           



                                                  0900,           



                                                  Until           



                                                  Discontinu           



                                                  ed,            



                                                  Routine           

 

     NaCl 0.9%            Yes            10mL      10 mL,           Univer

s



     (NS)                                     Slow IV           ity of



     injection      01:04:                               Push, PRN,           [place]

[place]



     10 mL      34                                 Starting           Medical



                                                  on Thu           [facility]anch



                                                  22 at           



                                                  ,           



                                                  Until           



                                                  Discontinu           



                                                  ed,            



                                                  Routine,           



                                                  line           



                                                  maintenanc           



                                                  e              

 

     lidocaine            Yes            5mL       5 mL,           [facility]



     1% (PF)                                     Subcutaneo           ity of



     (XYLOCAINE)      01:04:                               us, PRN,           [facility]

[place]



     injection 5      34                                 Starting           Medi

[facility]



     mL                                           on Thu           [facility]



                                                  22 at           



                                                  ,           



                                                  Until           



                                                  Discontinu           



                                                  ed,            



                                                  Routine,           



                                                  Local           



                                                  anesthesia           

 

     nystatin            Yes                      Topical,           Unive

rs



     (NYSTOP)                                     BID, First           ity o

f



     powder      01:00:                               dose on           [place]



               00                                 Thu            Medical



                                                  22 at           [facility]anch



                                                  2000,           



                                                  Until           



                                                  Discontinu           



                                                  ed,            



                                                  Routine           

 

     Sliding      2022-0      Yes                      Subcutaneo           Univ

ers



     Scale                                     us, TID           ity of



     Insulin -      22:00:                               MEALS+HS,           [facility]

as



     Lispro      00                                 First dose           Medical



     (HumaLOG) +                                         (after           [facility]anch



     Fsbg                                         last           



     Testing                                         modificati           



                                                  on) on u           



                                                  22 at           



                                                  1700,           



                                                  Until           



                                                  Discontinu           



                                                  ed,            



                                                  Routine           

 

     insulin      2022- No             3U        3 Units,           Unive

rs



     lispro       07-29                          Subcutaneo           ity of



     (human)      22:00: 14:02                          us, TID           [place]



     (HumaLOG      00   :21                           MEALS,           Medical



     U-100)                                         First dose           [facility]



     injection 3                                         on Thu           



     Units                                         22 at           



                                                  1700,           



                                                  Until           



                                                  Discontinu           



                                                  ed,            



                                                  Routine           

 

     mupirocin            Yes                                     [facility]



     (BACTROBAN                                                    ity of



     OINT) 2 %      21:45:                                              [place]



     skin      00                                                Medical



     ointment                                                        Branch

 

     collagenase            Yes                      Topical           Uni

vers



     (SANTYL)                                     (Apply To           ity of



     ointment      21:45:                               Affected           [place]



               00                                 Areas),           Medical



                                                  DAILY,           Branch



                                                  First dose           



                                                  (after           



                                                  last           



                                                  modificati           



                                                  on) on u           



                                                  22 at           



                                                  1645,           



                                                  Until           



                                                  Discontinu           



                                                  ed,            



                                                  Routine           

 

     HYDROcodone            Yes            1{tbl}      1 tablet,          

 [facility]



     -acetaminop                                     Oral,           ity of



     hen (NORCO      19:54:                               Q6HPRN,           [place]

[facility]



     5) 5-325 mg      07                                 Starting           Medi

sarah



     tablet 1                                         on u           Branch



     tablet                                         22 at           



                                                  1454,           



                                                  Until           



                                                  Discontinu           



                                                  ed,            



                                                  Routine,           



                                                  Pain           



                                                  (scale           



                                                  4-6)           

 

     HYDROcodone            Yes            1{tbl}      1 tablet,          

 [facility]



     -acetaminop                                     Oral,           ity of



     hen (NORCO)      19:47:                               Q6HPRN,           [facility]

as



      mg      33                                 Starting           Medica

l



     tablet 1                                         on Thu           Branch



     tablet                                         22 at           



                                                  1447,           



                                                  Until           



                                                  Discontinu           



                                                  ed,            



                                                  Routine,           



                                                  Pain           



                                                  (scale           



                                                  7-10)           

 

     lactated      2022- No             1000mL      at 100           [facility]

ers



     ringers IV       07-30                          mL/hr,           ity of



     infusion      18:30: 19:10                          1,000 mL,           Eric

as



     1,000 mL      00   :52                           IV             Medical



                                                  Infusion,           Branch



                                                  CONTINUOUS           



                                                  , Starting           



                                                  on Thu           



                                                  22 at           



                                                  1330,           



                                                  Until Sat           



                                                  22 at           



                                                  1410,           



                                                  Routine           

 

     fluconazole      2022- No             200mg      200 mg,           U

nivers



     (DIFLUCAN)                                Oral,           ity of



     tablet 200      14:00: 19:47                          DAILY,           [place]

[facility]



     mg        00   :17                           First dose           Medical



                                                  on Thu           Branch



                                                  22 at           



                                                  0900,           



                                                  Until           



                                                  Discontinu           



                                                  ed,            



                                                  ASAP<br>Re           



                                                  ason for           



                                                  Anti-Infec           



                                                  tive:           



                                                  Empiric           



                                                  Therapy           



                                                  for            



                                                  Suspected           



                                                  Infection<           



                                                  br>Empiric           



                                                  Therapy           



                                                  Site:           



                                                  Urine<br&g           



                                                  t;Duration           



                                                  of             



                                                  therapy:           



                                                  72 hours           

 

     NaCl 0.45%      2022- No             1000mL      at 150           Un

[facility]



     (1/2NS) IV                                mL/hr,           ity of



     infusion      02:15: 13:47                          1,000 mL,           Eric

as



     1,000 mL      00   :19                           IV             Medical



                                                  Infusion,           Branch



                                                  CONTINUOUS           



                                                  , Starting           



                                                  on Wed           



                                                  22 at           



                                                  2115,           



                                                  Until Thu           



                                                  22 at           



                                                  0847,           



                                                  Routine           

 

     proMETHazin      2022- No             12.5mg      12.5 mg,          

 [facility]



     e                                   IV             ity of



     (PHENERGAN)      02:15: 01:35                          [place], [place]



     12.5 mg in      00   :00                           ONCE, 1           Medica

l



     NaCl 0.9%                                         dose, On           Branch



     (NS) 50 mL                                         Wed            



     IV                                           22 at           



     piggyback                                         2115,           



                                                  Routine           

 

     insulin      2022- No             10U       10 Units,           Univ

ers



     lispro                                Subcutaneo           ity of



     (human)      01:30: 00:44                          , ONCE,           [place]

[facility]



     (HumaLOG      00   :00                           1 dose, On           Medic

al



     U-100)                                         Wed            Branch



     injection                                         22 at           



     10 Units                                         2030, ASAP           

 

     ertapenem      2022- No             1000mg      1,000 mg,           

[facility]



     (INVANZ)                                Intramuscu           ity 

of



     injection      01:30: 18:10                          lar, Q24H           [facility]

[place]



     1,000 mg      00   :43                           ABX, First           Medic

al



                                                  dose on           Branch



                                                  Wed            



                                                  22 at           



                                                  2030,           



                                                  Until           



                                                  Discontinu           



                                                  ed,            



                                                  ASAP<br&gt           



                                                  ;Reason           



                                                  for            



                                                  Anti-Infec           



                                                  tive:           



                                                  Empiric           



                                                  Therapy           



                                                  for            



                                                  Suspected           



                                                  Infection<           



                                                  br>Empiric           



                                                  Therapy           



                                                  Site:           



                                                  Urine<br>D           



                                                  uration of           



                                                  therapy:           



                                                  72             



                                                  hours<br>R           



                                                  estricted           



                                                  use            



                                                  approved           



                                                  by:            



                                                  History of           



                                                  ESBL           



                                                  infection           



                                                  in past 3           



                                                  months           

 

     HYDROmorphO      2022- No             1mg       1 mg,           Univ

ers



     ne                                  Intravenou           ity of



     (DILAUDID)      01:01: 19:45                          s, Q4HPRN,           

[place]



     injection 1      21   :38                           Starting           Medi

sarah



     mg                                           on Wed           Branch



                                                  22 at           



                                                  ,           



                                                  Until Thu           



                                                  22 at           



                                                  1445,           



                                                  Routine,           



                                                  Pain           



                                                  (scale           



                                                  7-10)<br>U           



                                                  se             



                                                  approved           



                                                  by             



                                                  (Faculty):           



                                                  ADC            



                                                  PROVIDER           

 

     Sliding      2022- No                       Subcutaneo           Uni

vers



     Scale                                us, Q3H,           ity of



     Insulin -      01:00: 18:35                          First dose           [facility]

[place]



     Lispro      00   :13                           (after           Medical



     (HumaLOG) +                                         last           Branch



     Fsbg                                         modificati           



     Testing                                         on) on 22 at           



                                                  ,           



                                                  Until           



                                                  Discontinu           



                                                  ed,            



                                                  Routine           

 

     pantoprazol            Yes            40mg      40 mg,           Univ

ers



     e                                        Oral,           ity of



     (PROTONIX)      00:30:                               DAILY,           [place]



     EC tablet      00                                 First dose           Medi

sarah



     40 mg                                         on Wed           Branch



                                                  22 at           



                                                  1930,           



                                                  Until           



                                                  Discontinu           



                                                  ed,            



                                                  Routine           

 

     FENTanyl PF      2022- No             50ug      50 mcg,           Un

[facility]



     (SUBLIMAZE                                Intramuscu           it

y of



     (PF))      22:51: 01:01                          lar,           [place]



     injection      14   :59                           Q4HPRN,           Medical



     50 mcg                                         Starting           Branch



                                                  on 22 at           



                                                  1751,           



                                                  Until 22 at           



                                                  ,           



                                                  Routine,           



                                                  Pain           



                                                  (scale           



                                                  7-10)           

 

     Sliding      2022- No                       Subcutaneo           Uni

vers



     Scale                                us, Q3H,           ity of



     Insulin -      22:00: 00:29                          First dose           T

[place]



     Lispro      00   :25                           on Wed           Medical



     (HumaLOG) +                                         22 at           Br

anch



     Fsbg                                         1700,           



     Testing                                         Until           



                                                  Discontinu           



                                                  ed,            



                                                  Routine           

 

     acetaminoph            Yes            1000mg      1,000 mg,          

 [facility]



     en                                       Oral, Q8H,           ity of



     (TYLENOL)      21:15:                               First dose           Te

[place]



     tablet      00                                 on Wed           Medical



     1,000 mg                                         22 at           [facility]

h



                                                  1615,           



                                                  Until           



                                                  Discontinu           



                                                  ed,            



                                                  Routine           

 

     FENTanyl PF       No             50ug      50 mcg,           Un

[facility]



     (SUBLIMAZE                                Slow IV           ity o

f



     (PF))      21:07: 22:51                          Push,           [place]



     injection      07   :27                           Q4HPRN,           Medical



     50 mcg                                         Starting           Branch



                                                  on 22 at           



                                                  1607,           



                                                  Until 22 at           



                                                  1751,           



                                                  Routine,           



                                                  Pain           



                                                  (scale           



                                                  7-10)           

 

     insulin      2022- No             30U       30 Units,           Univ

ers



     glargine                                Subcutaneo           ity 

of



     (LANTUS      19:30: 18:47                          us, DAILY,           Eric

as



     U-100)      00   :44                           First dose           Medical



     injection                                         (after           Branch



     30 Units                                         last           



                                                  modificati           



                                                  on) on 22 at           



                                                  1430,           



                                                  Until           



                                                  Discontinu           



                                                  ed,            



                                                  Routine           

 

     glucagon            Yes            1mg       1 mg,           [facility]



     (GLUCAGEN                                     Intramuscu           ity 

of



     DIAGNOSTIC      19:19:                               lar, PRN,           Te

[place]



     KIT)      57                                 Starting           Medical



     injection 1                                         on Wed           Branch



     mg                                           22 at           



                                                  1419,           



                                                  Until           



                                                  Discontinu           



                                                  ed, ASAP,           



                                                  Blood           



                                                  Glucose           



                                                  &amp;lt;           



                                                  or = 70           



                                                  mg/dL and           



                                                  patient is           



                                                  unable to           



                                                  swallow or           



                                                  has mental           



                                                  changes.           

 

     dextrose 50            Yes            25mL      25 mL,           Univ

ers



     % in water                                     Slow IV           ity of



     (D50W)      19:19:                               Push, PRN,           [place]



     injection      57                                 Starting           Medica

l



     25 mL                                         on Wed           Branch



                                                  22 at           



                                                  1419,           



                                                  Until           



                                                  Discontinu           



                                                  ed, ASAP,           



                                                  Blood           



                                                  Glucose           



                                                  &amp;lt;           



                                                  or = 70           



                                                  mg/dL and           



                                                  patient is           



                                                  unable to           



                                                  swallow or           



                                                  has mental           



                                                  status           



                                                  changes.           

 

     FENTanyl PF      2022- No             25ug      25 mcg,           Un

[facility]



     (SUBLIMAZE                                Slow IV           ity o

f



     (PF))      18:57: 21:09                          Push,           [place]



     injection      03   :29                           Q4HPRN,           Medical



     25 mcg                                         Starting           Branch



                                                  on 22 at           



                                                  1357,           



                                                  Until 22 at           



                                                  1609,           



                                                  Routine,           



                                                  Pain           



                                                  (scale           



                                                  7-10)           

 

     D5W IV      2022- No             1000mL      at 500           Univer

s



     infusion                                mL/hr, IV           ity o

f



     1,000 mL      18:30: 20:00                          Infusion,           Eric

as



               00   :00                           ONCE, 1           Medical



                                                  dose, On           Branch



                                                  22 at           



                                                  1330,           



                                                  Routine           

 

     NaCl 0.9%      2022- No             1000mL      at 999           Uni

vers



     (NS) bolus                                mL/hr,           ity of



     infusion      18:15: 20:19                          1,000 mL,           Eric

as



     1,000 mL      00   :00                           IV             Medical



                                                  Infusion,           Branch



                                                  ONCE, 1           



                                                  dose, On           



                                                  22 at           



                                                  1315, STAT           

 

     potassium      2022- No             20meq      20 mEq, IV           

[facility]



     chloride 20                                Piggyback,           i

ty of



     mEq/100 mL      17:45: 21:20                          Q1H, 4           [place]

[facility]



     (KCL) 20      00   :00                           doses,           Medical



     mEq/100 mL                                         First dose           Bra

[facility]



     RTU IVPB 20                                         on Wed           



     mEq                                          22 at           



                                                  1245, Last           



                                                  dose on           



                                                  22 at           



                                                  1500, 100           



                                                  mL             

 

     KCL       2022- No                       IV             [facility]



     (POTASSIUM                                Infusion,           ity

 of



     CHLORIDE)      17:30: 00:27                          CONTINUOUS           T

[facility]



     40 mEq in      00   :47                           , Starting           Medi

[facility]



     NaCl 0.45%                                         on Wed           [facility]



     (1/2NS) IV                                         22 at           



     Solution                                         1230,           



                                                  Until 22 at           



                                                  1927,           



                                                  1,000 mL,           



                                                  at 200           



                                                  mL/hr           

 

     KCL       2022- No                       IV             [facility]



     (POTASSIUM                                Infusion,           ity

 of



     CHLORIDE)      15:45: 17:15                          CONTINUOUS           T

[facility]



     40 mEq in      00   :42                           , Starting           Medi

[facility]



     NaCl 0.45%                                         on Wed           [facility]



     (1/2NS) IV                                         22 at           



     Solution                                         1045,           



                                                  Until 22 at           



                                                  1215,           



                                                  1,000 mL,           



                                                  at 150           



                                                  mL/hr           

 

     ondansetron            Yes            4mg       4 mg, Slow           

[facility]



     (ZOFRAN                                     IV Push,           ity of



     (PF))      15:38:                               Q6HPRN,           [place]



     injection 4      50                                 Nausea and           Me

dical



     mg                                           Vomiting           Branch



                                                  (N/V),           



                                                  Starting           



                                                  on 22 at           



                                                  1038<br>Do           



                                                  ses of           



                                                  ondansetro           



                                                  n 16 mg           



                                                  and above           



                                                  need to be           



                                                  administer           



                                                  ed via IV           



                                                  piggyback.           



                                                  For Dose           



                                                  >=24mg ECG           



                                                  monitoring           



                                                  is             



                                                  advisable.           



                                                  <br>           

 

     enoxaparin            Yes            40mg      40 mg,           Un[facility]

rs



     (LOVENOX)                                     Subcutaneo           ity 

of



     injection      14:00:                               us, DAILY,           [facility]

[facility]



     40 mg      00                                 First dose           Medical



                                                  on Wed           [facility]



                                                  22 at           



                                                  0900,           



                                                  Until           



                                                  Discontinu           



                                                  ed,            



                                                  Routine           

 

     fluconazole       No             200mg      at 100           Un

[facility]



     (DIFLUCAN)       0728                          mL/hr, IV           ity

 of



     Piggyback      13:45: 00:25                          Piggyback,           T

[facility]



     200 mg      00   :36                           Q24H ABX,           Medical



                                                  First dose           [facility]



                                                  on 22 at           



                                                  0845,           



                                                  Until           



                                                  Discontinu           



                                                  ed,            



                                                  ASAP<br>Do           



                                                  Not            



                                                  Refrigerat           



                                                  e.<br>           

 

     NORepinephr                   .05ug/k      0.05-1.5         

  [facility]



     ine 4 mg in                      g/min      mcg/kg/min           

ity of



     0.9% NaCl      12:21: 22:51                          ?127 kg           [place]

[facility]



     250 mL      22   :36                           (23.8125-7           Medical



     infusion                                         14.375           [facility]



     RTU                                          mL/hr,           



                                                  rounded to           



                                                  23..           



                                                  38 mL/hr),           



                                                  IV             



                                                  Infusion,           



                                                  TITRATE,           



                                                  MAP Goal >           



                                                  or = 65           



                                                  mmHg,           



                                                  Starting           



                                                  on 22 at           



                                                  0721<br>In           



                                                  itiate           



                                                  titration           



                                                  at 0.05           



                                                  mcg/kg/min           



                                                  .&nbsp;&nb           



                                                  sp;Increas           



                                                  e by 0.01           



                                                  mcg/kg/min           



                                                  every 30           



                                                  seconds to           



                                                  5 minutes           



                                                  as needed           



                                                  to reach           



                                                  and            



                                                  maintain           



                                                  goal blood           



                                                  pressure.&           



                                                  nbsp;&nbsp           



                                                  ;Maximum           



                                                  dose = 1.5           



                                                  mcg/kg/min           



                                                  .&nbsp;&nb           



                                                  sp;If goal           



                                                  not            



                                                  maintained           



                                                  at maximum           



                                                  allowed           



                                                  dose,           



                                                  contact           



                                                  prescriber           



                                                  .<br>           

 

     potassium                   10meq      10 mEq, IV           

[facility]



     chloride in                                Piggyback,           i

ty of



     water 10      12:00: 14:44                          Q1H, 3           [place]



     mEq/100 mL      00   :00                           doses,           Medical



     RTU 10 mEq                                         First dose           [facility]

nc



                                                  on 22 at           



                                                  0700, Last           



                                                  dose on           



                                                  22 at           



                                                  0900,           



                                                  Administer           



                                                  over 60           



                                                  Minutes,           



                                                  100 mL           

 

     aztreonam      2022- No             1000mg      1,000 mg,           

[facility]



     (AZACTAM)                                Slow IV           ity of



     injection      12:00: 12:41                          Push, Q8H           [facility]

[facility]



     1,000 mg      00   :55                           ABX, First           Medic

al



                                                  dose on           [facility]



                                                  22 at           



                                                  0700,           



                                                  Until           



                                                  Discontinu           



                                                  ed<br>Reas           



                                                  on for           



                                                  Anti-Infec           



                                                  tive:           



                                                  Empiric           



                                                  Therapy           



                                                  for            



                                                  Suspected           



                                                  Infection<           



                                                  br>Empiric           



                                                  Therapy           



                                                  Site:           



                                                  Urine&lt;b           



                                                  r>Duration           



                                                  of             



                                                  therapy: 7           



                                                  days           

 

     NaCl 0.9%      2022- No             1000mL      at 999           Uni

vers



     (NS) IV                                mL/hr, IV           ity of



     infusion      11:45: 11:49                          Infusion,           [facility]

as



     1,000 mL      00   :30                           ONCE, 1           Medical



                                                  dose, On           [facility]



                                                  22 at           



                                                  0645,           



                                                  Routine           

 

     NaCl 0.45%      2022- No             1000mL      at 250           Un

[facility]



     (1/2NS) IV                                mL/hr,           ity of



     infusion      11:30: 14:41                          1,000 mL,           [facility]

as



     1,000 mL      00   :42                           IV             Medical



                                                  Infusion,           [facility]



                                                  CONTINUOUS           



                                                  , Starting           



                                                  on 22 at           



                                                  0630,           



                                                  Until 22 at           



                                                  0941, ASAP           

 

     insulin      2022- No             0U/kg/h      0-0.3           Unive

rs



     regular in                                Units/kg/h           it

y of



     0.9 % NaCl      11:19: 19:18                          r ?127 kg           T

[facility]



     (MYXREDLIN)      08   :18                           (0-38.1           Medic

al



     100                                          mL/hr), IV           [facility]



     unit/100 mL                                         Infusion,           



     (1 unit/mL)                                         TITRATE,           



     RTU IV                                         Parameters           



     infusion                                         in Admin.           



                                                  Instr.,           



                                                  Follow DKA           



                                                  Insulin           



                                                  Rate           



                                                  Adjustment           



                                                  Protocol,           



                                                  Starting           



                                                  on 22 at           



                                                  0619<br>-           



                                                  Follow           



                                                  'DKA           



                                                  Insulin           



                                                  Rate           



                                                  Adjustment           



                                                  Protocol'           



                                                  &nbsp;-           



                                                  Start           



                                                  insulin           



                                                  infusion           



                                                  at 0.1           



                                                  units/kg           



                                                  /hr. When           



                                                  adjusting           



                                                  rate, do           



                                                  not            



                                                  increase           



                                                  rate above           



                                                  0.3            



                                                  units/kg/h           



                                                  our.&nbsp;           



                                                  - Hold           



                                                  insulin           



                                                  and [facility]           



                                                  STAT if           



                                                  potassium           



                                                  is less           



                                                  than 3.3           



                                                  mEq/L.&nbs           



                                                  p;- [facility]           



                                                  STAT if           



                                                  blood           



                                                  glucose           



                                                  less than           



                                                  100 mg/dL           



                                                  or greater           



                                                  than 600           



                                                  mg/dL or           



                                                  if blood           



                                                  glucose           



                                                  not            



                                                  decreased           



                                                  by 50           



                                                  mg/dL in           



                                                  the first           



                                                  hour of           



                                                  insulin           



                                                  infusion.&           



                                                  nbsp;-           



                                                  Once blood           



                                                  glucose is           



                                                  less than           



                                                  or equal           



                                                  to 200           



                                                  mg/dL in           



                                                  patient           



                                                  with DKA           



                                                  or blood           



                                                  glucose is           



                                                  less than           



                                                  or equal           



                                                  to 300           



                                                  mg/dL in           



                                                  patient           



                                                  with HHS,           



                                                  change to           



                                                  fluids           



                                                  with           



                                                  dextrose.&           



                                                  nbsp;&amp;           



                                                  nbsp;&nbsp           



                                                  ;- If           



                                                  during           



                                                  insulin           



                                                  infusion           



                                                  and on           



                                                  dextrose           



                                                  fluids           



                                                  blood           



                                                  glucose is           



                                                  less than           



                                                  150 mg/dL           



                                                  in DKA or           



                                                  less than           



                                                  200 mg/dL           



                                                  in HHS,           



                                                  [facility]O for           



                                                  increase           



                                                  in             



                                                  dextrose           



                                                  provided           



                                                  in             



                                                  continuous           



                                                  fluids.&nb           



                                                  sp;- Once           



                                                  AGAP less           



                                                  than 12,           



                                                  blood           



                                                  glucose           



                                                  less than           



                                                  200 mg/dL,           



                                                  TCO2           



                                                  greater           



                                                  than 15 x           



                                                  2 and           



                                                  patient           



                                                  ready to           



                                                  eat, [facility]O           



                                                  for SubQ           



                                                  glargine           



                                                  order two           



                                                  hours           



                                                  before           



                                                  stopping           



                                                  insulin           



                                                  infusion.<           



                                                  br>            

 

     NaCl 0.9%      2022- No             1000mL      at 999           Uni

vers



     (NS) IV                                mL/hr, IV           ity of



     infusion      08:30: 11:00                          Infusion,           [facility]

as



     1,000 mL      00   :00                           ONCE, 1           Medical



                                                  dose, On           [facility]



                                                  22 at           



                                                  0330,           



                                                  Routine           

 

     NaCl 0.45%      2022- No             1000mL      at 250           Un

[facility]



     (1/2NS) IV                                mL/hr,           ity of



     infusion      06:00: 11:20                          1,000 mL,           [facility]

as



     1,000 mL      00   :23                           IV             Medical



                                                  Infusion,           [facility]



                                                  CONTINUOUS           



                                                  , Starting           



                                                  on 22 at           



                                                  0100,           



                                                  Until 22 at           



                                                  0620, ASAP           

 

     FENTanyl PF      2022- No             50ug      50 mcg,           Un

[facility]



     (SUBLIMAZE                                Slow IV           ity o

f



     (PF))      04:15: 03:07                          Push,           [place]



     injection      00   :00                           ONCE, 1           Medical



     50 mcg                                         dose, On           [facility]



                                                  22 at           



                                                  2315,           



                                                  Routine           

 

     cefTRIAXone      2022- No             1000mg      1,000 mg,         

  [facility]



     (ROCEPHIN)                                Intravenou           it

y of



     1,000 mg in      04:00: 06:06                          s, ONCE, 1          

 [place]



     NaCl 0.9%      00   :00                           dose, On           Medica

l



     (NS) 50 mL                                         Tue            [facility]



     MINI-BAG                                         22 at           



                                                  2300,           



                                                  Administer           



                                                  over 30           



                                                  Minutes,           



                                                  50             



                                                  mL<br&gt;R           



                                                  elmira for           



                                                  Anti-Infec           



                                                  tive:           



                                                  Documented           



                                                  Infection<           



                                                  br>Documen           



                                                  huma            



                                                  Infection           



                                                  Site:           



                                                  Urine<br&g           



                                                  t;Duration           



                                                  of             



                                                  Therapy:           



                                                  Other (see           



                                                  Comments)           

 

     NaCl 0.9%      2022- No             1000mL      at 999           Uni

vers



     (NS) bolus                                mL/hr,           ity of



     infusion      03:00: 04:14                          1,000 mL,           [facility]

as



     1,000 mL      00   :00                           IV             Medical



                                                  Infusion,           [facility]



                                                  ONCE, 1           



                                                  dose, On           



                                                  22 at           



                                                  2200, STAT           

 

     insulin      2022- No             10U       10 Units,           Univ

ers



     regular                                Subcutaneo           ity o

f



     human      01:30: 00:37                          us, ONCE,           [place]



     (HUMULIN R)      00   :00                           1 dose, On           Me

dical



     injection                                         Tue            [facility]



     10 Units                                         22 at           



                                                  2030,           



                                                  Routine           

 

     FENTanyl PF      2022- No             50ug      50 mcg,           Un

[facility]



     (SUBLIMAZE                                Slow IV           ity o

f



     (PF))      01:30: 00:28                          Push,           [place]



     injection      00   :00                           ONCE, 1           Medical



     50 mcg                                         dose, On           [facility]



                                                  Tue            



                                                  22 at           



                                                  2030,           



                                                  Routine           

 

     NaCl 0.9%      2022- No             1000mL      at 999           Uni

vers



     (NS) bolus                                mL/hr,           ity of



     infusion      00:30: 02:09                          1,000 mL,           Eric

as



     1,000 mL      00   :00                           IV             Medical



                                                  Infusion,           [facility]



                                                  ONCE, 1           



                                                  dose, On           



                                                  Tue            



                                                  22 at           



                                                  1930, STAT           

 

     iopamidol      2022- No        [id number] 65mL      65 mL,         

  [facility]



     (ISOVUE                 [id number]           Intravenou           ity

 of



     370-500 mL)      02:29: 02:45                          s, ONCE, 1          

 [place]



     injection      00   :00                           dose, On           Medica

l



     65 mL                                         Fri            [facility]



                                                  7/15/22 at           



                                                  2145,           



                                                  Routine           

 

     FENTanyl PF      2022- No             150ug      150 mcg,           

[facility]



     (SUBLIMAZE                                Slow IV           ity o

f



     (PF))      01:32: 01:44                          Push,           [place]



     injection      00   :00                           ONCE, 1           Medical



     150 mcg                                         dose, On           [facility]



                                                  Fri            



                                                  7/15/22 at           



                                                  ,           



                                                  Routine           

 

     FENTanyl PF      2022- No             50ug      50 mcg,           Un

[facility]



     (SUBLIMAZE      7-16 07-15                          Slow IV           ity o

f



     (PF))      00:30: 23:32                          Push,           [place]



     injection      00   :00                           ONCE, 1           Medical



     50 mcg                                         dose, On           [facility]



                                                  Fri            



                                                  7/15/22 at           



                                                  1930,           



                                                  Routine           

 

     insulin      2022- No             10U       10 Units,           Univ

ers



     regular      7-16 07-15                          Subcutaneo           ity o

f



     human      00:30: 23:28                          , ONCE,           [place]



     (HUMULIN R)      00   :00                           1 dose, On           Me

dical



     injection                                         Fri            [facility]



     10 Units                                         7/15/22 at           



                                                  193,           



                                                  Routine           

 

     clindamycin      2022- No        [id number] 600mg      Take 2      

     [facility]



     300 mg      7-15 07-23           [id number]           capsules           ity of



     capsule      00:00: 04:59                          by mouth 4           [facility]

as



               00   :00                           (four)           Medical



                                                  times           [place]



                                                  daily for           



                                                  7 days.           

 

     clindamycin      2022- No        [id number] 600mg      Take 2      

     [facility]



     300 mg      7-15 07-23           [id number]           capsules           ity of



     capsule      00:00: 04:59                          by mouth 4           [facility]

as



               00   :00                           (four)           Medical



                                                  times           [place]



                                                  daily for           



                                                  7 days.           

 

     insulin NPH      2022- No             8U        8 Units,           U

nivers



     and regular      -06                          Subcutaneo           i

ty of



     human 70-30      12:30: 11:30                          us, ONCE,           

[place]



     (70-30      00   :00                           1 dose, On           Medical



     U-100                                         Wed 22           [facility]



     INSULIN)                                         at 0730,           



     100 unit/mL                                         ASAP           



     (70-30)                                                        



     injection 8                                                        



     Units                                                        

 

     insulin            Yes       [id number] 20U       inject 20           U

nivers



     glargine      7-02                               Units           ity of



     100 unit/mL      00:00:                               under the           [facility]

[facility]



     injection      00                                 skin           Medical



                                                  daily.           [facility]

 

     insulin            Yes       [id number] 20U       inject 20           U

nivers



     glargine      7-02                               Units           ity of



     100 unit/mL      00:00:                               under the           T

[facility]



     injection      00                                 skin           Medical



                                                  daily.           [facility]

 

     insulin            Yes       [id number] 20U       inject 20           U

nivers



     glargine      7-02                               Units           ity of



     100 unit/mL      00:00:                               under the           T

[facility]



     injection      00                                 skin           Medical



                                                  daily.           [facility]

 

     insulin            Yes       [id number] 20U       inject 20           U

nivers



     glargine      7-02                               Units           ity of



     100 unit/mL      00:00:                               under the           T

[facility]



     injection      00                                 skin           Medical



                                                  daily.           [facility]

 

     insulin      2022- No        [id number] 20U       inject 20           

[facility]



     glargine       07-30                          Units           ity of



     100 unit/mL      00:00: 00:00                          under the           

[place]



     injection      00   :00                           skin           Medical



                                                  daily.           [facility]

 

     insulin      2022- No        [id number] 20U       inject 20           

[facility]



     glargine       07-30                          Units           ity of



     100 unit/mL      00:00: 00:00                          under the           

[place]



     injection      00   :00                           skin           Medical



                                                  daily.           [facility]

 

     insulin            Yes            20U       20 Units,           Unive

rs



     glargine                                     Subcutaneo           ity o

f



     (LANTUS      14:00:                               us, DAILY,           [place]

[place]



     U-100)      00                                 First dose           Medical



     injection                                         (after           [facility]



     20 Units                                         last           



                                                  modificati           



                                                  on) on 22 at           



                                                  0900,           



                                                  Until           



                                                  Discontinu           



                                                  ed,            



                                                  Routine           

 

     insulin      2022- No        [id number] 26U       inject 26           

[facility]



     lispro,       08                          Units           ity of



     human, 100      00:00: 04:59                          under the           T

[facility]



     unit/mL      00   :00                           skin 3           Medical



     injection                                         (three)           [facility]



                                                  times           



                                                  daily           



                                                  before           



                                                  meals for           



                                                  30 days.           

 

     insulin      2022- No        [id number] 26U       inject 26           

[facility]



     lispro,       08-                          Units           ity of



     human, 100      00:00: 04:59                          under the           T

[facility]



     unit/mL      00   :00                           skin 3           Medical



     injection                                         (three)           [facility]



                                                  times           



                                                  daily           



                                                  before           



                                                  meals for           



                                                  30 days.           

 

     insulin      2022- No        [id number] 26U       inject 26           

[facility]



     lispro,       08                          Units           ity of



     human, 100      00:00: 04:59                          under the           T

[facility]



     unit/mL      00   :00                           skin 3           Medical



     injection                                         (three)           [facility]



                                                  times           



                                                  daily           



                                                  before           



                                                  meals for           



                                                  30 days.           

 

     insulin      2022- No        [id number] 26U       inject 26           

[facility]



     lispro,       08-01                          Units           ity of



     human, 100      00:00: 04:59                          under the           T

[facility]



     unit/mL      00   :00                           skin 3           Medical



     injection                                         (three)           [facility]



                                                  times           



                                                  daily           



                                                  before           



                                                  meals for           



                                                  30 days.           

 

     insulin      2022- No        [id number] 26U       inject 26           

[facility]



     lispro,       07-30                          Units           ity of



     human, 100      00:00: 00:00                          under the           T

[facility]



     unit/mL      00   :00                           skin 3           Medical



     injection                                         (three)           [facility]



                                                  times           



                                                  daily           



                                                  before           



                                                  meals for           



                                                  30 days.           

 

     insulin      2022- No        [id number] 26U       inject 26           

[facility]



     lispro,       07-30                          Units           ity of



     human, 100      00:00: 00:00                          under the           T

[facility]



     unit/mL      00   :00                           skin 3           Medical



     injection                                         (three)           [facility]



                                                  times           



                                                  daily           



                                                  before           



                                                  meals for           



                                                  30 days.           

 

     insulin            Yes            26U       26 Units,           Unive

rs



     lispro      -30                               Subcutaneo           ity of



     (human)      22:15:                               us, TIDAC,           [place]

[place]



     (HumaLOG      00                                 First dose           Medic

al



     U-100)                                         (after           [facility]



     injection                                         last           



     26 Units                                         modificati           



                                                  on) on Thu           



                                                  22 at           



                                                  1715,           



                                                  Until           



                                                  Discontinu           



                                                  ed,            



                                                  Routine           

 

     insulin      2022- No             22U       22 Units,           Univ

ers



     lispro      --30                          Subcutaneo           ity of



     (human)      16:30: 21:36                          us, TIDAC,           [facility]

as



     (HumaLOG      00   :36                           First dose           Medic

al



     U-100)                                         (after           [facility]



     injection                                         last           



     22 Units                                         modificati           



                                                  on) on Thu           



                                                  22 at           



                                                  1130,           



                                                  Until           



                                                  Discontinu           



                                                  ed,            



                                                  Routine           

 

     insulin      2022- No             10U       10 Units,           Univ

ers



     glargine      6-30 06-30                          Subcutaneo           ity 

of



     (LANTUS      14:00: 17:40                          us, DAILY,           [facility]

as



     U-100)      00   :01                           First dose           Medical



     injection                                         on Thu           [facility]



     10 Units                                         22 at           



                                                  0900,           



                                                  Until           



                                                  Discontinu           



                                                  ed,            



                                                  Routine           

 

     insulin            Yes            52U       52 Units,           Unive

rs



     glargine      6-30                               Subcutaneo           ity o

f



     (LANTUS      02:00:                               us, [facility],           [place]



     U-100)      00                                 First dose           Medical



     injection                                         on Wed           [facility]



     52 Units                                         22 at           



                                                  2100,           



                                                  Until           



                                                  Discontinu           



                                                  ed,            



                                                  Routine           

 

     enoxaparin            Yes            40mg      40 mg,           Unive

rs



     (LOVENOX)                                     Subcutaneo           ity 

of



     injection      22:00:                               us, DAILY,           [place]

[place]



     40 mg      00                                 First dose           Medical



                                                  on Wed           [facility]



                                                  22 at           



                                                  1700,           



                                                  Until           



                                                  Discontinu           



                                                  ed,            



                                                  Routine           

 

     Sliding            Yes                      Subcutaneo           Univ

ers



     Scale                                     us, TID           ity of



     Insulin -      17:00:                               MEALS+HS,           [facility]

as



     Lispro      00                                 First dose           Medical



     (HumaLOG) +                                         (after           [facility]



     Fsbg                                         last           



     Testing                                         modificati           



                                                  on) on 22 at           



                                                  1200,           



                                                  Until           



                                                  Discontinu           



                                                  ed,            



                                                  Routine           

 

     insulin       No             18U       18 Units,           Univ

ers



     lispro                                Subcutaneo           ity of



     (human)      16:30: 14:46                          us, TIDAC,           [facility]

as



     (HumaLOG      00   :32                           First dose           Medic

al



     U-100)                                         (after           [facility]



     injection                                         last           



     18 Units                                         modificati           



                                                  on) on 22 at           



                                                  1130,           



                                                  Until           



                                                  Discontinu           



                                                  ed,            



                                                  Routine           

 

     amLODIPine            Yes            10mg      10 mg,           Unive

rs



     (NORVASC)                                     Oral,           ity of



     tablet 10      14:00:                               DAILY,           [place]



     mg        00                                 First dose           Medical



                                                  on Wed           Branch



                                                  22 at           



                                                  0900,           



                                                  Until           



                                                  Discontinu           



                                                  ed,            



                                                  Routine           

 

     cefTRIAXone            Yes            1000mg      1,000 mg,          

 [facility]



     (ROCEPHIN)                                     IV             ity of



     1,000 mg in      13:00:                               Piggyback,           

[place]



     NaCl 0.9%      00                                 Q24H ABX,           Medic

al



     (NS) 50 mL                                         First dose           Bra

[facility]



     MINI-BAG                                         on 22 at           



                                                  0800,           



                                                  Until           



                                                  Discontinu           



                                                  ed,            



                                                  Administer           



                                                  over 30           



                                                  Minutes,           



                                                  50             



                                                  mL<br>Reas           



                                                  on for           



                                                  Anti-Infec           



                                                  tive:           



                                                  Documented           



                                                  Infection<           



                                                  br>Documen           



                                                  huma            



                                                  Infection           



                                                  Site:           



                                                  Urine<br>D           



                                                  uration of           



                                                  Therapy: 7           



                                                  days           

 

     Sliding      2022- No                       Subcutaneo           Uni

vers



     Scale                                us, TID           ity of



     Insulin -      13:00: 15:19                          MEALS+HS,           [place]

[place]



     Lispro      00   :51                           First dose           Medical



     (HumaLOG) +                                         on Wed           Branch



     Fsbg                                         22 at           



     Testing                                         0800,           



                                                  Until           



                                                  Discontinu           



                                                  ed,            



                                                  Routine           

 

     insulin       No             15U       15 Units,           Univ

ers



     lispro                                Subcutaneo           ity of



     (human)      12:30: 15:19                          us, TIDAC,           Eric

as



     (HumaLOG      00   :51                           First dose           Medic

al



     U-100)                                         on Wed           [facility]



     injection                                         22 at           



     15 Units                                         0730,           



                                                  Until           



                                                  Discontinu           



                                                  ed,            



                                                  Routine           

 

     HYDROmorpho      2022- No             1mg       1 mg, Slow          

 [facility]



     ne         07                          IV Push,           ity of



     (DILAUDID)      11:32: 11:31                          Q6HPRN,           Eric

as



     injection 1      00   :00                           Starting           Medi

[place]



     mg                                           on Wed           Branch



                                                  22 at           



                                                  0632,           



                                                  Until 22 at           



                                                  0631,           



                                                  Routine,           



                                                  Pain           



                                                  (scale           



                                                  7-10)<br>U           



                                                  se             



                                                  approved           



                                                  by             



                                                  (Faculty):           



                                                  [facility]            



                                                  PROVIDER           

 

     NaCl 0.9%       No             1000mL      at 999           Uni

vers



     (NS) bolus                                mL/hr,           ity of



     infusion      11:00: 11:04                          1,000 mL,           Eric

as



     1,000 mL      00   :00                           IV             Medical



                                                  Infusion,           [facility]



                                                  ONCE, 1           



                                                  dose, On           



                                                  22 at           



                                                  0600, STAT           

 

     HYDROcodone            Yes            1{tbl}      1 tablet,          

 [facility]



     -acetaminop                                     Oral,           ity of



     hen (NORCO      10:54:                               Q6HPRN,           [place]

s



     5) 5-325 mg      27                                 Starting           Medi

sarah



     tablet 1                                         on Wed           Branch



     tablet                                         22 at           



                                                  0554,           



                                                  Until           



                                                  Discontinu           



                                                  ed,            



                                                  Routine,           



                                                  Pain           



                                                  (scale           



                                                  4-6)           

 

     dextrose            Yes            250mL      250 mL, IV           Un

[place]



     10% (D10W)                                     Infusion,           ity 

of



     bolus      10:53:                               PRN - SEE           [place]



     infusion      43                                 INSTRUCTIO           Medic

al



     250 mL                                         NS,            Branch



                                                  Administer           



                                                  over 60           



                                                  Minutes,           



                                                  hypoglycem           



                                                  ia,            



                                                  Starting           



                                                  on 22 at           



                                                  0553<br>De           



                                                  xtrose 10%           



                                                  250 mL bag           



                                                  contains:&           



                                                  nbsp;10 gm           



                                                  = 100           



                                                  mL&nbsp;20           



                                                  gm = 200           



                                                  mL&nbsp;25           



                                                  gm = 250           



                                                  mL (whole           



                                                  bag)&nbsp;           



                                                  &nbsp;The           



                                                  maximum           



                                                  rate at           



                                                  which           



                                                  dextrose           



                                                  can be           



                                                  infused           



                                                  without           



                                                  producing           



                                                  glycosuria           



                                                  is 0.5           



                                                  g/kg/hour.           



                                                  &nbsp;&nbs           



                                                  p;BUD: If           



                                                  wrapper is           



                                                  open bag           



                                                  is good           



                                                  for 30           



                                                  days at           



                                                  room           



                                                  temperatur           



                                                  e.&nbsp;<b           



                                                  r>             

 

     glucagon            Yes            1mg       1 mg,           [facility]



     (GLUCAGEN                                     Intramuscu           ity 

of



     DIAGNOSTIC      10:53:                               lar, PRN,           [place]

[place]



     KIT)      40                                 Starting           Medical



     injection 1                                         on Wed           Branch



     mg                                           22 at           



                                                  0553,           



                                                  Until           



                                                  Discontinu           



                                                  ed, ASAP,           



                                                  Blood           



                                                  Glucose           



                                                  &amp;lt;           



                                                  or = 70           



                                                  mg/dL and           



                                                  patient is           



                                                  unable to           



                                                  swallow or           



                                                  has mental           



                                                  changes.           

 

     acetaminoph            Yes            650mg      650 mg,           Un

[place]



     en                                       Oral,           ity of



     (TYLENOL)      10:52:                               Q6HPRN,           [place]



     tablet 650      33                                 Starting           Medic

al



     mg                                           on Wed           Branch



                                                  22 at           



                                                  0552,           



                                                  Until           



                                                  Discontinu           



                                                  ed,            



                                                  Routine,           



                                                  Pain           



                                                  (scale           



                                                  1-3)           

 

     NaCl 0.9%      2022- No             1000mL      at 999           Uni

vers



     (NS) bolus                                mL/hr,           ity of



     infusion      09:00: 08:10                          1,000 mL,           Eric

as



     1,000 mL      00   :00                           IV             Medical



                                                  Infusion,           [place]



                                                  ONCE, 1           



                                                  dose, On           



                                                  22 at           



                                                  0400, STAT           

 

     FENTanyl PF      2022- No             50ug      50 mcg,           Un

[place]



     (SUBLIMAZE                                Slow IV           ity o

f



     (PF))      08:22: 08:27                          Push,           [place]



     injection      00   :00                           ONCE, 1           Medical



     50 mcg                                         dose, On           Branch



                                                  22 at           



                                                  0330,           



                                                  Routine           

 

     insulin      2022- No             10U       10 Units,           Univ

ers



     regular                                Slow IV           ity of



     human      07:00: 05:53                          Push,           [place]



     (HUMULIN R)      00   :00                           ONCE, 1           Medic

al



     injection                                         dose, On           Branch



     10 Units                                         Wed            



                                                  22 at           



                                                  0200, STAT           

 

     FENTanyl PF      2022- No             50ug      50 mcg,           Un

[place]



     (SUBLIMAZE                                Slow IV           ity o

f



     (PF))      04:15: 04:04                          Push,           [place]



     injection      00   :00                           ONCE, 1           Medical



     50 mcg                                         dose, On           Branch



                                                  e            



                                                  22 at           



                                                  2315, STAT           

 

     NaCl 0.9%                   1000mL      at 999           Uni

vers



     (NS) bolus                                mL/hr,           ity of



     infusion      04:00: 05:53                          1,000 mL,           Eric

as



     1,000 mL      00   :00                           IV             Medical



                                                  Infusion,           [place]



                                                  ONCE, 1           



                                                  dose, On           



                                                  22 at           



                                                  2300, STAT           

 

     insulin       No             10U       10 Units,           Univ

ers



     regular                                Slow IV           ity of



     human      04:00: 03:48                          Push,           [place]



     (HUMULIN R)      00   :00                           ONCE, 1           Medic

al



     injection                                         dose, On           Branch



     10 Units                                         22 at           



                                                  2300, STAT           

 

     amLODIPine      2022- No        [id number] 10mg      Take 1           

[facility]



     10 mg      6-26 07-30                          tablet by           ity of



     tablet      00:00: 00:00                          mouth           [place]



               00   :00                           daily for           Medical



                                                  30 days.           [place]

 

     amLODIPine       No        [id number] 10mg      Take 1           

[facility]



     10 mg      6-26 07-30                          tablet by           ity of



     tablet      00:00: 00:00                          mouth           [place]



               00   :00                           daily for           Medical



                                                  30 days.           [place]

 

     amLODIPine       No        [id number] 10mg      Take 1           

[facility]



     10 mg      6-26 07-27                          tablet by           ity of



     tablet      00:00: 04:59                          mouth           [place]



               00   :00                           daily for           Medical



                                                  30 days.           [place]

 

     amLODIPine      2022- No        [id number] 10mg      Take 1           

[facility]



     10 mg      6-26 07-27                          tablet by           ity of



     tablet      00:00: 04:59                          mouth           [place]



               00   :00                           daily for           Medical



                                                  30 days.           [place]

 

     amLODIPine       No        [id number] 10mg      Take 1           

[facility]



     10 mg      6-26 07-27                          tablet by           ity of



     tablet      00:00: 04:59                          mouth           [place]



               00   :00                           daily for           Medical



                                                  30 days.           [place]

 

     amLODIPine       No        [id number] 10mg      Take 1           

[facility]



     10 mg      6-26 07-27                          tablet by           ity of



     tablet      00:00: 04:59                          mouth           [place]



               00   :00                           daily for           Medical



                                                  30 days.           [place]

 

     amLODIPine      2022- No        [id number] 10mg      Take 1           

[facility]



     10 mg      --                          tablet by           ity of



     tablet      00:00: 04:59                          mouth           [place]



               00   :00                           daily for           Medical



                                                  30 days.           [facility]

 

     amLODIPine      2022- No        [id number] 10mg      Take 1           

[facility]



     10 mg      --27                          tablet by           ity of



     tablet      00:00: 04:59                          mouth           [place]



               00   :00                           daily for           Medical



                                                  30 days.           [facility]

 

     amLODIPine      2022- No        [id number] 10mg      Take 1           

[facility]



     10 mg      --                          tablet by           ity of



     tablet      00:00: 04:59                          mouth           [place]



               00   :00                           daily for           Medical



                                                  30 days.           Branch

 

     HYDROmorpho       No             .5mg      0.5 mg,           Un

[place]



     ne        -                          Slow IV           ity of



     (DILAUDID)      18:00: 17:11                          Push,           [place]



     injection      00   :00                           ONCE, 1           Medical



     0.5 mg                                         dose, On           [facility]



                                                  Sat            



                                                  22 at           



                                                  1300,           



                                                  Routine<br           



                                                  >Use           



                                                  approved           



                                                  by             



                                                  (Faculty):           



                                                  [facility]            



                                                  PROVIDER           

 

     cefTRIAXone            Yes            1000mg      1,000 mg,          

 [facility]



     (ROCEPHIN)                                     IV             ity of



     1,000 mg in      15:00:                               Piggyback,           

[place]



     NaCl 0.9%      00                                 Q24H ABX,           Medic

al



     (NS) 50 mL                                         First dose           Bra

[facility]



     MINI-BAG                                         on Sat           



                                                  22 at           



                                                  1000,           



                                                  Until           



                                                  Discontinu           



                                                  ed,            



                                                  Administer           



                                                  over 30           



                                                  Minutes,           



                                                  50             



                                                  mL<br>Reas           



                                                  on for           



                                                  Anti-Infec           



                                                  tive:           



                                                  Documented           



                                                  Infection<           



                                                  br>Documen           



                                                  huma            



                                                  Infection           



                                                  Site:           



                                                  Urine<br>D           



                                                  uration of           



                                                  Therapy: 7           



                                                  days           

 

     fluconazole            Yes            200mg      200 mg,           Un

[place]



     (DIFLUCAN)                                     Oral,           ity of



     tablet 200      14:00:                               DAILY,           [place]



     mg        00                                 First dose           Medical



                                                  on [facility]



                                                  22 at           



                                                  0900,           



                                                  Until           



                                                  Discontinu           



                                                  ed,            



                                                  ASAP<br>Re           



                                                  ason for           



                                                  Anti-Infec           



                                                  tive:           



                                                  Documented           



                                                  Infection<           



                                                  br>Documen           



                                                  huma            



                                                  Infection           



                                                  Site:           



                                                  Urine<br>D           



                                                  uration of           



                                                  Therapy: 7           



                                                  days           

 

     Sliding            Yes                      Subcutaneo           Univ

ers



     Scale      -                               us, TID           ity of



     Insulin -      13:00:                               MEALS+HS,           [facility]

as



     Lispro      00                                 First dose           Medical



     (HumaLOG) +                                         on Sat           [place]



     Fsbg                                         22 at           



     Testing                                         0800,           



                                                  Until           



                                                  Discontinu           



                                                  ed,            



                                                  Routine           

 

     hydromorpho      2022- No             4ug       Take 4 mcg          

 [facility]



     ne HCl                                by mouth           ity of



     (HYDROMORPH      11:55: 00:00                          every 12           [facility]

[place]



     ONE ORAL)      19   :00                           (twelve)           Medica

l



                                                  hours.           Branch

 

     insulin      2022- No             10U       10 Units,           Univ

ers



     lispro                                Subcutaneo           ity of



     (human)      07:00: 06:53                          us, ONCE,           [place]

[place]



     (HumaLOG      00   :00                           1 dose, On           Medic

al



     U-100)                                         Sat            Branch



     injection                                         22 at           



     10 Units                                         0200,           



                                                  Routine           

 

     glucagon            Yes            1mg       1 mg,           [facility]



     (GLUCAGEN                                     Intramuscu           ity 

of



     DIAGNOSTIC      05:50:                               lar, PRN,           [facility]

[place]



     KIT)      51                                 Starting           Medical



     injection 1                                         on Sat           Branch



     mg                                           22 at           



                                                  0050,           



                                                  Until           



                                                  Discontinu           



                                                  ed, ASAP,           



                                                  Blood           



                                                  Glucose           



                                                  &amp;lt;           



                                                  or = 70           



                                                  mg/dL and           



                                                  patient is           



                                                  unable to           



                                                  swallow or           



                                                  has mental           



                                                  changes.           

 

     dextrose            Yes            250mL      250 mL, IV           Un

[place]



     10% (D10W)                                     Infusion,           ity 

of



     bolus      05:50:                               PRN - SEE           [place]



     infusion      51                                 INSTRUCTIO           Medic

al



     250 mL                                         NS,            Branch



                                                  Administer           



                                                  over 60           



                                                  Minutes,           



                                                  BS < 70,           



                                                  Starting           



                                                  on Sat           



                                                  22 at           



                                                  0050<br>De           



                                                  xtrose 10%           



                                                  250 mL bag           



                                                  contains:&           



                                                  nbsp;10 gm           



                                                  = 100           



                                                  mL&nbsp;20           



                                                  gm = 200           



                                                  mL&nbsp;25           



                                                  gm = 250           



                                                  mL (whole           



                                                  bag)&nbsp;           



                                                  &nbsp;The           



                                                  maximum           



                                                  rate at           



                                                  which           



                                                  dextrose           



                                                  can be           



                                                  infused           



                                                  without           



                                                  producing           



                                                  glycosuria           



                                                  is 0.5           



                                                  g/kg/hour.           



                                                  &nbsp;&nbs           



                                                  p;BUD: If           



                                                  wrapper is           



                                                  open bag           



                                                  is good           



                                                  for 30           



                                                  days at           



                                                  room           



                                                  temperatur           



                                                  e.&nbsp;<b           



                                                  r>             

 

     insulin            Yes            52U       52 Units,           Unive

rs



     glargine                                     Subcutaneo           ity o

f



     (LANTUS      02:00:                               us, [facility],           [place]



     U-100)      00                                 First dose           Medical



     injection                                         on Fri           Branch



     52 Units                                         22 at           



                                                  2100,           



                                                  Until           



                                                  Discontinu           



                                                  ed,            



                                                  Routine           

 

     Sliding      2022- No                       Subcutaneo           Uni

vers



     Scale      6- 06-25                          us, TID           ity of



     Insulin -      02:00: 05:50                          MEALS+HS,           [facility]

[place]



     Lispro      00   :40                           First dose           Medical



     (HumaLOG) +                                         on Fri           Branch



     Fsbg                                         22 at           



     Testing                                         2100,           



                                                  Until           



                                                  Discontinu           



                                                  ed,            



                                                  Routine           

 

     ciprofloxac      2022- No        [id number] 500mg      Take 1         

  [facility]



     in HCl 500      -06                          tablet by           ity

 of



     mg tablet      00:00: 04:59                          mouth           [place]



               00   :00                           every 12           Medical



                                                  (twelve)           Branch



                                                  hours for           



                                                  10 days.           

 

     ciprofloxac      2022- No        [id number] 500mg      Take 1         

  [facility]



     in HCl 500      -                          tablet by           ity

 of



     mg tablet      00:00: 04:59                          mouth           [place]



               00   :00                           every 12           Medical



                                                  (twelve)           Branch



                                                  hours for           



                                                  10 days.           

 

     ciprofloxac      2022- No        [id number] 500mg      Take 1         

  [facility]



     in HCl 500      -                          tablet by           ity

 of



     mg tablet      00:00: 04:59                          mouth           [place]



               00   :00                           every 12           Medical



                                                  (twelve)           Branch



                                                  hours for           



                                                  10 days.           

 

     HYDROmorpho      2022- No        4647 4mg       Take 1           Uni

vers



     ne 4 mg      -25 -03                          tablet by           ity of



     tablet      00:00: 04:59                          mouth           [place]



               00   :00                           every 12           Medical



                                                  (twelve)           Branch



                                                  hours for           



                                                  7 days.           



                                                  Indication           



                                                  s: acute           



                                                  pain           

 

     HYDROmorpho      2022- No        4647 4mg       Take 1           Uni

vers



     ne 4 mg      6-25 07-03                          tablet by           ity of



     tablet      00:00: 04:59                          mouth           [place]



               00   :00                           every 12           Medical



                                                  (twelve)           Branch



                                                  hours for           



                                                  7 days.           



                                                  Indication           



                                                  s: acute           



                                                  pain           

 

     HYDROmorpho      2022- No        4647 4mg       Take 1           Uni

vers



     ne 4 mg      6-25 07-03                          tablet by           ity of



     tablet      00:00: 04:59                          mouth           [place]



               00   :00                           every 12           Medical



                                                  (twelve)           Branch



                                                  hours for           



                                                  7 days.           



                                                  Indication           



                                                  s: acute           



                                                  pain           

 

     HYDROmorpho      2022- No        4647 4mg       Take 1           Uni

vers



     ne 4 mg      6-25 07-03                          tablet by           ity of



     tablet      00:00: 04:59                          mouth           [place]



               00   :00                           every 12           Medical



                                                  (twelve)           Branch



                                                  hours for           



                                                  7 days.           



                                                  Indication           



                                                  s: acute           



                                                  pain           

 

     ciprofloxac      2022- No        [id number] 500mg      Take 1         

  [facility]



     in HCl 500                                tablet by           ity

 of



     mg tablet      00:00: 00:00                          mouth           [place]



               00   :00                           every 12           Medical



                                                  (twelve)           Branch



                                                  hours for           



                                                  10 days.           

 

     NaCl 0.9%      2022- No             1000mL      at 150           Uni

vers



     (NS) bolus                                mL/hr,           ity of



     infusion      23:15: 23:38                          1,000 mL,           Eric

as



     1,000 mL      00   :00                           IV             Medical



                                                  Piggyback,           Branch



                                                  ONCE, 1           



                                                  dose, On           



                                                  22 at           



                                                  1815, STAT           

 

     HYDROmorpho      2022- No             1mg       1 mg, Slow          

 Univers



     ne                                  IV Push,           ity of



     (DILAUDID)      22:15: 21:46                          ONCE, 1           Eric

as



     injection 1      00   :00                           dose, On           Medi

[place]



     mg                                           Fri            [place]



                                                  22 at           



                                                  1715,           



                                                  Routine<br           



                                                  >Use           



                                                  approved           



                                                  by             



                                                  (Faculty):           



                                                  ADC            



                                                  PROVIDER           

 

     enoxaparin      0      Yes            40mg      40 mg,           Unive

rs



     (LOVENOX)                                     Subcutaneo           ity 

of



     injection      22:00:                               us, DAILY,           Te

[place]



     40 mg      00                                 First dose           Medical



                                                  on 22 at           



                                                  1700,           



                                                  Until           



                                                  Discontinu           



                                                  ed,            



                                                  Routine           

 

     glucagon      -0      Yes            1mg       1 mg,           [facility]



     (GLUCAGEN                                     Intravenou           ity 

of



     DIAGNOSTIC      21:57:                               s, PRN -           Eric

as



     KIT)      53                                 SEE            Medical



     injection 1                                         INSTRUCTIO           Br

anch



     mg                                           NS,            



                                                  Starting           



                                                  on 22 at           



                                                  1657,           



                                                  Until           



                                                  Discontinu           



                                                  ed,            



                                                  Routine,           



                                                  hypoglycem           



                                                  ia             

 

     glucagon      -0      Yes            1mg       1 mg,           [facility]



     (GLUCAGEN      24                               Intramuscu           ity 

of



     DIAGNOSTIC      21:57:                               lar, PRN,           Te

[place]



     KIT)      44                                 Starting           Medical



     injection 1                                         on Fri           Branch



                                                22 at           



                                                  1657,           



                                                  Until           



                                                  Discontinu           



                                                  ed, ASAP,           



                                                  Blood           



                                                  Glucose           



                                                  &amp;lt;           



                                                  or = 70           



                                                  mg/dL and           



                                                  patient is           



                                                  unable to           



                                                  swallow or           



                                                  has mental           



                                                  changes.           

 

     HYDROmorpho      -0      Yes            4mg       4 mg,           Unive

rs



     ne                                       Oral,           ity of



     (DILAUDID)      13:00:                               Q12H,           [place]



     tablet 4 mg      00                                 First dose           Me

dical



                                                  on Fri           Branch



                                                  22 at           



                                                  0800,           



                                                  Until           



                                                  Discontinu           



                                                  ed             

 

     insulin            Yes            15U       15 Units,           Unive

rs



     lispro                                     Subcutaneo           ity of



     (human)      12:30:                               us, TIDAC,           [place]

[place]



     (HumaLOG      00                                 First dose           Medic

al



     U-100)                                         on Fri           Branch



     injection                                         22 at           



     15 Units                                         0730,           



                                                  Until           



                                                  Discontinu           



                                                  ed,            



                                                  Routine           

 

     amLODIPine            Yes            10mg      10 mg,           Unive

rs



     (NORVASC)                                     Oral,           ity of



     tablet 10      08:45:                               DAILY,           [place]



     mg        00                                 First dose           Medical



                                                  on Fri           Branch



                                                  22 at           



                                                  0345,           



                                                  Until           



                                                  Discontinu           



                                                  ed,            



                                                  Routine           

 

     ertapenem       No             1000mg      1,000 mg,           

[facility]



     (INVANZ)      25                          IV             ity of



     1,000 mg in      08:45: 13:51                          Piggyback,          

 [place]



     NaCl 0.9%      00   :26                           Q24H ABX,           Medic

al



     (NS) 50 mL                                         First dose           Bra

nch



     MINI-BAG                                         on 22 at           



                                                  0345,           



                                                  Until           



                                                  Discontinu           



                                                  ed,            



                                                  Administer           



                                                  over 30           



                                                  Minutes,           



                                                  50             



                                                  mL<br>Reas           



                                                  on for           



                                                  Anti-Infec           



                                                  tive:           



                                                  [facility]           



                                                  Therapy           



                                                  for            



                                                  Suspected           



                                                  Infection<           



                                                  br>[facility]           



                                                  Therapy           



                                                  Site:           



                                                  Urine<br>D           



                                                  uration of           



                                                  therapy: 7           



                                                  days<br>Re           



                                                  stricted           



                                                  use            



                                                  approved           



                                                  by:            



                                                  History of           



                                                  ESBL           



                                                  infection           



                                                  in past 3           



                                                  months           

 

     hydralAZINE            Yes            10mg      10 mg,           Univ

ers



     (APRESOLINE                                     Slow IV           ity o

f



     ) injection      08:33:                               Push,           [place]



     10 mg      28                                 Q6HPRN,           Medical



                                                  Starting           [facility]



                                                  on 22 at           



                                                  0333,           



                                                  Until           



                                                  Discontinu           



                                                  ed,            



                                                  Routine,           



                                                  DBP=&gt;10           



                                                  0;             



                                                  SBP=>160,           



                                                  For SBP >           



                                                  160            

 

     acetaminoph            Yes            650mg      650 mg,           Un

[facility]



     en                                       Oral,           ity of



     (TYLENOL)      07:37:                               Q6HPRN,           [place]



     tablet 650      37                                 Starting           Medic

al



     mg                                           on Fri           [facility]



                                                  22 at           



                                                  0237,           



                                                  Until           



                                                  Discontinu           



                                                  ed,            



                                                  Routine,           



                                                  Pain           



                                                  (scale           



                                                  1-3)           

 

     iopamidol      2022- No        [id number] 100mL      100 mL,          

 [facility]



     (ISOVUE                                Intravenou           ity o

f



     370-500 mL)      06:15: 05:05                          s, ONCE, 1          

 [place]



     injection      00   :00                           dose, On           Medica

l



     100 mL                                         Fri            [facility]



                                                  22 at           



                                                  0115,           



                                                  Routine           

 

     insulin       No             10U       10 Units,           Univ

ers



     regular                                IV Push,           ity of



     human      06:00: 05:20                          ONCE, 1           [place]



     (HUMULIN R)      00   :00                           dose, On           Medi

[facility]



     injection                                         Fri            [facility]



     10 Units                                         22 at           



                                                  0100, ASAP           

 

     FENTanyl PF       No             50ug      50 mcg,           Un

[facility]



     (SUBLIMAZE                                Slow IV           ity o

f



     (PF))      05:45: 04:47                          Push,           [place]



     injection      00   :00                           ONCE, 1           Medical



     50 mcg                                         dose, On           [facility]



                                                  Fri            



                                                  22 at           



                                                  0045, STAT           

 

     FENTanyl PF       No             50ug      50 mcg,           Un

[facility]



     (SUBLIMAZE                                Slow IV           ity o

f



     (PF))      04:15: 04:00                          Push,           [place]



     injection      00   :00                           ONCE, 1           Medical



     50 mcg                                         dose, On           [facility]



                                                  u            



                                                  22 at           



                                                  2315, STAT           

 

     NaCl 0.9%       No             1000mL      at 999           Uni

vers



     (NS) bolus                                mL/hr,           ity of



     infusion      04:15: 06:41                          1,000 mL,           [facility]

as



     1,000 mL      00   :00                           IV             Medical



                                                  Infusion,           [facility]



                                                  ONCE, 1           



                                                  dose, On           



                                                  u            



                                                  22 at           



                                                  2315, STAT           

 

     hydromorpho            Yes            4ug       Take 4 mcg           

[facility]



     ne HCl      6-10                               by mouth           ity of



     (HYDROMORPH      19:50:                               every 12           [facility]

[place]



     ONE ORAL)      08                                 (twelve)           Medica

l



                                                  hours.           [facility]

 

     magnesium            Yes            400mg      400 mg,           Univ

ers



     oxide      6-10                               Oral,           ity of



     (MAG-OX      14:00:                               DAILY,           [place]



     400) tablet      00                                 First dose           Me

dical



     400 mg                                         on Fri           [facility]



                                                  6/10/22 at           



                                                  0900,           



                                                  Until           



                                                  Discontinu           



                                                  ed,            



                                                  Routine           

 

     HYDROmorpho      2022- No             1mg       1 mg, Slow          

 [facility]



     ne        6-10 06-10                          IV Push,           ity of



     (DILAUDID)      05:15: 04:42                          ONCE, 1           [facility]

as



     injection 1      00   :00                           dose, On           Medi

[facility]



     mg                                           Fri            [place]



                                                  6/10/22 at           



                                                  0015,           



                                                  Routine<br           



                                                  >Use           



                                                  approved           



                                                  by             



                                                  (Faculty):           



                                                  ADC            



                                                  PROVIDER           

 

     acetaminoph            Yes            1000mg      1,000 mg,          

 [facility]



     en        6-10                               Oral, Q8H,           ity of



     (TYLENOL)      03:00:                               First dose           [facility]

[facility]



     tablet      00                                 on Thu           Medical



     1,000 mg                                         22 at           [facility]



                                                  2200,           



                                                  Until           



                                                  Discontinu           



                                                  ed,            



                                                  Routine           

 

     insulin            Yes       [id number] 52U       inject 52           U

nivers



     glargine      6-10                               Units           ity of



     100 unit/mL      00:00:                               under the           T

[facility]



     injection      00                                 skin at           Medical



                                                  bedtime.           [place]

 

     insulin      0      Yes       [id number] 52U       inject 52           U

nivers



     glargine      6-10                               Units           ity of



     100 unit/mL      00:00:                               under the           T

[facility]



     injection      00                                 skin at           Medical



                                                  bedtime.           [place]

 

     insulin      0      Yes       [id number] 52U       inject 52           U

nivers



     glargine      6-10                               Units           ity of



     100 unit/mL      00:00:                               under the           T

[facility]



     injection      00                                 skin at           Medical



                                                  bedtime.           [place]

 

     insulin      -0      Yes        52U       inject 52           U

nivers



     glargine      6-10                               Units           ity of



     100 unit/mL      00:00:                               under the           T

[facility]



     injection      00                                 skin at           Medical



                                                  bedtime.           [place]

 

     insulin      -0      Yes       [id number] 52U       inject 52           U

nivers



     glargine      6-10                               Units           ity of



     100 unit/mL      00:00:                               under the           T

[facility]



     injection      00                                 skin at           Medical



                                                  bedtime.           [facility]

 

     insulin      -0      Yes       [id number] 52U       inject 52           U

nivers



     glargine      6-10                               Units           ity of



     100 unit/mL      00:00:                               under the           T

[facility]



     injection      00                                 skin at           Medical



                                                  bedtime.           [facility]

 

     insulin      -0      Yes       [id number] 52U       inject 52           U

nivers



     glargine      6-10                               Units           ity of



     100 unit/mL      00:00:                               under the           T

[facility]



     injection      00                                 skin at           Medical



                                                  bedtime.           [facility]

 

     insulin      -0      Yes       [id number] 52U       inject 52           U

nivers



     glargine      6-10                               Units           ity of



     100 unit/mL      00:00:                               under the           T

[facility]



     injection      00                                 skin at           Medical



                                                  bedtime.           [facility]

 

     insulin      2022- No        [id number] 52U       inject 52           

[facility]



     glargine      6-10 07-30                          Units           ity of



     100 unit/mL      00:00: 00:00                          under the           

[place]



     injection      00   :00                           skin at           Medical



                                                  bedtime.           [facility]

 

     insulin      2022- No        [id number] 52U       inject 52           

[facility]



     glargine      6-10 07-30                          Units           ity of



     100 unit/mL      00:00: 00:00                          under the           

[place]



     injection      00   :00                           skin at           Medical



                                                  bedtime.           [facility]

 

     Insulin      2022- No        [id number]           Use as           Uni

vers



     Safety      6-10 07-09                          directed           ity of



     [place],      00:00: 04:59                                         [place]



     Disp, 29      00   :00                                          Medical



     gauge x                                                        [facility]



     3/16" Ndle                                                        

 

     Insulin      -0 - No        [id number]           Use as           Uni

vers



     Safety      6-10 07-09                          directed           ity of



     [place],      00:00: 04:59                                         [place]



     Disp, 29      00   :00                                          Medical



     gauge x                                                        [facility]



     3/16" Ndle                                                        

 

     Insulin      -0 - No        [id number]           Use as           Uni

vers



     Safety      6-10 07-09                          directed           ity of



     [place],      00:00: 04:59                                         [place]



     Disp, 29      00   :00                                          Medical



     gauge x                                                        Br[facility]



     3/16" Ndle                                                        

 

     Insulin      -0 - No        [id number]           Use as           Uni

vers



     Safety      6-10 07-09                          directed           ity of



     [place],      00:00: 04:59                                         [place]



     Disp, 29      00   :00                                          Medical



     gauge x                                                        Br[facility]



     3/16" Ndle                                                        

 

     Insulin      -0 - No        [id number]           Use as           Uni

vers



     Safety      6-10 07-09                          directed           ity of



     [place],      00:00: 04:59                                         [place]



     Disp, 29      00   :00                                          Medical



     gauge x                                                        Br[facility]



     3/16" Ndle                                                        

 

     Insulin      -0 - No        [id number]           Use as           Uni

vers



     Safety      6-10 07-09                          directed           ity of



     [place],      00:00: 04:59                                         [place]



     Disp, 29      00   :00                                          Medical



     gauge x                                                        Br[facility]



     3/16" Ndle                                                        

 

     Insulin      -0 - No        [id number]           Use as           Uni

vers



     Safety      6-10 07-09                          directed           ity of



     [place],      00:00: 04:59                                         [place]



     Disp, 29      00   :00                                          Medical



     gauge x                                                        Br[facility]



     3/16" Ndle                                                        

 

     fluconazole      2022- No        [id number] 200mg      Take 1         

  [facility]



     200 mg      6-10 06-23                          tablet by           ity of



     tablet      00:00: 04:59                          mouth           [place]



               00   :00                           daily for           Medical



                                                  12 days.           [facility]

 

     magnesium      2022- No             4g        4 g, IV           Univ

ers



     sulfate in                                Piggyback,           it

y of



     water 4      20:45: 21:58                          ONCE, 1           [place]



     gram/50 mL      00   :00                           dose, On           Medic

al



     (8 %) IV                                         Thu 22           Branc

h



     Piggyback 4                                         at 1545,           



     g                                            Routine           

 

     HYDROmorpho            Yes            4mg       4 mg,           Unive

rs



     ne                                       Oral,           ity of



     (DILAUDID)      19:32:                               Q6HPRN,           [place]

[place]



     tablet 4 mg      47                                 Starting           Medi

[facility]



                                                  on Th           [facility]



                                                  22 at           



                                                  1432,           



                                                  Until           



                                                  Discontinu           



                                                  ed,            



                                                  Routine,           



                                                  Pain           



                                                  (scale           



                                                  7-10)           

 

     insulin            Yes            30U       30 Units,           Unive

rs



     glargine                                     Subcutaneo           ity o

f



     (LANTUS      02:00:                               us, [facility],           [place]



     U-100)      00                                 First dose           Medical



     injection                                         on Wed           [facility]



     30 Units                                         22 at           



                                                  2100,           



                                                  Until           



                                                  Discontinu           



                                                  ed,            



                                                  Routine           

 

     HYDROmorpho      2022- No             .5mg      0.5 mg,           Un

[facility]



     ne        09                          Slow IV           ity of



     (DILAUDID)      16:28: 19:33                          Push,           [place]



     injection      03   :06                           Q4HPRN,           Medical



     0.5 mg                                         Starting           Br[facility]



                                                  on 22 at           



                                                  1128,           



                                                  Until Thu           



                                                  22 at           



                                                  1433,           



                                                  Routine,           



                                                  Pain           



                                                  (scale           



                                                  7-10)<br>U           



                                                  se             



                                                  approved           



                                                  by             



                                                  (Faculty):           



                                                  ADC            



                                                  PROVIDER           

 

     fluconazole            Yes            200mg      200 mg,           Un

[facility]



     (DIFLUCAN)                                     Oral,           ity of



     tablet 200      14:45:                               DAILY,           [place]



     mg        00                                 First dose           Medical



                                                  on Wed           Branch



                                                  22 at           



                                                  0945,           



                                                  Until           



                                                  Discontinu           



                                                  ed,            



                                                  ASAP<br>Re           



                                                  ason for           



                                                  Anti-Infec           



                                                  tive:           



                                                  [facility]           



                                                  Therapy           



                                                  for            



                                                  Suspected           



                                                  Infection<           



                                                  br>[facility]           



                                                  Therapy           



                                                  Site:           



                                                  Urine<br>D           



                                                  uration of           



                                                  therapy:           



                                                  72 hours           

 

     enoxaparin            Yes            40mg      40 mg,           Unive

rs



     (LOVENOX)                                     Subcutaneo           ity 

of



     injection      14:00:                               us, DAILY,           [facility]

[facility]



     40 mg      00                                 First dose           Medical



                                                  on Wed           [facility]



                                                  22 at           



                                                  0900,           



                                                  Until           



                                                  Discontinu           



                                                  ed,            



                                                  Routine           

 

     tamsulosin            Yes            .4mg      0.4 mg,           Univ

ers



     (FLOMAX)                                     Oral,           ity of



     capsule 0.4      14:00:                               DAILY,           [place]

[facility]



     mg        00                                 First dose           Medical



                                                  on Wed           [facility]



                                                  22 at           



                                                  0900,           



                                                  Until           



                                                  Discontinu           



                                                  ed,            



                                                  Routine           

 

     insulin       No             10U       10 Units,           Univ

ers



     glargine      -                          Subcutaneo           ity 

of



     (LANTUS      12:00: 12:51                          us, ONCE,           [place]

[facility]



     U-100)      00   :00                           1 dose, On           Medical



     injection                                         22           [facility]

ch



     10 Units                                         at 0700,           



                                                  Routine           

 

     HYDROmorpho       No             1mg       1 mg, Slow          

 [facility]



     ne                                  IV Push,           ity of



     (DILAUDID)      10:00: 09:23                          ONCE, 1           [facility]

as



     injection 1      00   :00                           dose, On           Medi

sar[facility]



     mg                                           22           [facility]



                                                  at 0500,           



                                                  Routine<br           



                                                  >Use           



                                                  approved           



                                                  by             



                                                  (Faculty):           



                                                  ADC            



                                                  PROVIDER           

 

     HYDROmorpho       No             4mg       4 mg,           Univ

ers



     ne        -                          Oral,           ity of



     (DILAUDID)      08:40: 16:28                          [id number],           [facility]

[facility]



     tablet 4 mg      04   :14                           Starting           Medi

sar[facility]



                                                  on 22 at           



                                                  0340,           



                                                  Until 22 at           



                                                  1128,           



                                                  Routine,           



                                                  Pain           



                                                  (scale           



                                                  7-10)           

 

     HYDROmorpho       No             1mg       1 mg, Slow          

 [facility]



     ne        -                          IV Push,           ity of



     (DILAUDID)      06:15: 05:41                          ONCE, 1           [facility]

as



     injection 1      00   :00                           dose, On           Medi

sar[facility]



     mg                                           22           [facility]



                                                  at 0115,           



                                                  Routine<br           



                                                  >Use           



                                                  approved           



                                                  by             



                                                  (Faculty):           



                                                  ADC            



                                                  PROVIDER           

 

     aztreonam      2022- No             1000mg      1,000 mg,           

[facility]



     (AZACTAM)       06-10                          IV             ity of



     1,000 mg in      05:30: 16:58                          Piggyback,          

 [place]



     NaCl 0.9%      00   :42                           Q8H ABX,           Medica

l



     (NS) 100 mL                                         First dose           Br

anch



     MINI-BAG                                         on 22 at           



                                                  0030,           



                                                  Until           



                                                  Discontinu           



                                                  ed,            



                                                  Administer           



                                                  over 30           



                                                  Minutes,           



                                                  100            



                                                  mL<br>Reas           



                                                  on for           



                                                  Anti-Infec           



                                                  tive:           



                                                  Empiric           



                                                  Therapy           



                                                  for            



                                                  Suspected           



                                                  Infection<           



                                                  br>Empiric           



                                                  Therapy           



                                                  Site:           



                                                  Urine<br>D           



                                                  uration of           



                                                  therapy: 7           



                                                  days           

 

     Sliding            Yes                      Subcutaneo           Univ

ers



     Scale      6-08                               us, Q4H,           ity of



     Insulin-Reg      05:00:                               First dose           

[place]



     ular + Fsbg      00                                 on Wed           Medica

l



     Testing                                         22 at           [facility]



                                                  0000,           



                                                  Until           



                                                  Discontinu           



                                                  ed,            



                                                  Routine           

 

     NaCl 0.9%      2022- No             1000mL      at 125           Uni

vers



     (NS) IV      -08                          mL/hr, IV           ity of



     infusion      04:30: 16:28                          Infusion,           [facility]

as



     1,000 mL      00   :27                           CONTINUOUS           Medic

al



                                                  , Starting           [facility]



                                                  on 22 at           



                                                  2330,           



                                                  Until 22 at           



                                                  1128,           



                                                  Routine           

 

     glucagon            Yes            1mg       1 mg,           [facility]



     (GLUCAGEN                                     Intravenou           ity 

of



     DIAGNOSTIC      04:10:                               s, PRN -           [facility]

as



     KIT)      53                                 SEE            Medical



     injection 1                                         INSTRUCTIO           Br

anch



     mg                                           NS,            



                                                  Starting           



                                                  on 22 at           



                                                  2310,           



                                                  Until           



                                                  Discontinu           



                                                  ed,            



                                                  Routine,           



                                                  For Blood           



                                                  glucose <           



                                                  70             

 

     proCHLORper            Yes            10mg      10 mg,           Univ

ers



     azine                                     Slow IV           ity of



     (COMPAZINE)      04:10:                               Push,           [place]



     injection      40                                 Q6HPRN,           Medical



     10 mg                                         Starting           [facility]



                                                  on 22 at           



                                                  2310,           



                                                  Until           



                                                  Discontinu           



                                                  ed,            



                                                  Routine,           



                                                  Nausea and           



                                                  Vomiting           



                                                  (N/V)           

 

     FENTanyl PF      2022- No             50ug      50 mcg,           Un

[facility]



     (SUBLIMAZE       06-08                          Slow IV           ity o

f



     (PF))      03:04: 03:37                          Push,           [place]



     injection      00   :00                           ONCE, 1           Medical



     50 mcg                                         dose, On           [facility]



                                                  22           



                                                  at 2215,           



                                                  STAT           

 

     NaCl 0.9%      2022- No             1000mL      at 999           Uni

vers



     (NS) IV       06-08                          mL/hr,           ity of



     infusion      01:16: 01:54                          Intravenou           [facility]

[facility]



     1,000 mL      00   :00                           s, ONCE, 1           Medic

al



                                                  dose, On           [facility]



                                                  22           



                                                  at 2030,           



                                                  Routine           

 

     insulin      2022- No             .1U/kg      13.7 Units           U

nivers



     regular      -08                          (rounded           ity of



     human      01:15: 01:51                          from 1374           [place]



     (HUMULIN R)      00   :00                           Units =           Medic

al



     injection                                         0.1            [facility]



     13.7 Units                                         Units/kg           



                                                  ?137.4           



                                                  kg), Slow           



                                                  IV Push,           



                                                  ONCE, 1           



                                                  dose, On           



                                                  22           



                                                  at 2030,           



                                                  STAT           

 

     FENTanyl PF      2022- No             50ug      50 mcg,           Un

[facility]



     (SUBLIMAZE                                Slow IV           ity o

f



     (PF))      01:00: 01:52                          Push,           [place]



     injection      00   :00                           ONCE, 1           Medical



     50 mcg                                         dose, On           [facility]



                                                  22           



                                                  at 2015,           



                                                  ASAP           

 

     NaCl 0.9%      2022- No             1000mL      at 999           Uni

vers



     (NS) IV      -08                          mL/hr,           ity of



     infusion      23:47: 00:53                          Intravenou           Te

[facility]



     1,000 mL      00   :00                           s, ONCE, 1           Medic

al



                                                  dose, On           [facility]



                                                  22           



                                                  at 1900,           



                                                  ASAP           

 

     hydromorpho            Yes            4ug       Take 4 mcg           

[facility]



     ne HCl      6-07                               by mouth           ity of



     (HYDROMORPH      23:17:                               every 12           Te

[facility]



     ONE ORAL)      17                                 (twelve)           Medica

l



                                                  hours.           [facility]

 

     hydromorpho            Yes            4ug       Take 4 mcg           

[facility]



     ne HCl      6-06                               by mouth           ity of



     (HYDROMORPH      18:29:                               every 12           Te

[facility]



     ONE ORAL)      31                                 (twelve)           Medica

l



                                                  hours.           [facility]

 

     insulin            Yes       [id number] 15U       inject 15           U

nivers



     lispro,      6-06                               Units           ity of



     human, 100      00:00:                               under the           Te

[facility]



     unit/mL      00                                 skin 3           Medical



     injection                                         (three)           [facility]



                                                  times           



                                                  daily           



                                                  before           



                                                  meals.           

 

     insulin      -0      Yes        15U       inject 15           U

nivers



     lispro,      6-06                               Units           ity of



     human, 100      00:00:                               under the           Te

[facility]



     unit/mL      00                                 skin 3           Medical



     injection                                         (three)           [facility]



                                                  times           



                                                  daily           



                                                  before           



                                                  meals.           

 

     insulin      -0      Yes        15U       inject 15           U

nivers



     lispro,      6-06                               Units           ity of



     human, 100      00:00:                               under the           Te

[facility]



     unit/mL      00                                 skin 3           Medical



     injection                                         (three)           [facility]



                                                  times           



                                                  daily           



                                                  before           



                                                  meals.           

 

     insulin      0      Yes        15U       inject 15           U

nivers



     lispro,      6-06                               Units           ity of



     human, 100      00:00:                               under the           Te

[facility]



     unit/mL      00                                 skin 3           Medical



     injection                                         (three)           [facility]



                                                  times           



                                                  daily           



                                                  before           



                                                  meals.           

 

     insulin      0      Yes        15U       inject 15           U

nivers



     lispro,      6-06                               Units           ity of



     human, 100      00:00:                               under the           Te

[facility]



     unit/mL      00                                 skin 3           Medical



     injection                                         (three)           [facility]



                                                  times           



                                                  daily           



                                                  before           



                                                  meals.           

 

     insulin      0      Yes        15U       inject 15           U

nivers



     lispro,      6-06                               Units           ity of



     human, 100      00:00:                               under the           Te

[facility]



     unit/mL      00                                 skin 3           Medical



     injection                                         (three)           [facility]



                                                  times           



                                                  daily           



                                                  before           



                                                  meals.           

 

     insulin      -0      Yes        52U       inject 52           U

nivers



     glargine      6-06                               Units           ity of



     100 unit/mL      00:00:                               under the           T

[facility]



     injection      00                                 skin at           Medical



                                                  bedtime.           [facility]

 

     insulin      -0      Yes        15U       inject 15           U

nivers



     lispro,      6-06                               Units           ity of



     human, 100      00:00:                               under the           Te

[facility]



     unit/mL      00                                 skin 3           Medical



     injection                                         (three)           [facility]



                                                  times           



                                                  daily           



                                                  before           



                                                  meals.           

 

     insulin      -0      Yes        52U       inject 52           U

nivers



     glargine      6-06                               Units           ity of



     100 unit/mL      00:00:                               under the           T

[facility]



     injection      00                                 skin at           Medical



                                                  bedtime.           Branch

 

     insulin      -0      Yes        15U       inject 15           U

nivers



     lispro,      6-06                               Units           ity of



     human, 100      00:00:                               under the           Te

[facility]



     unit/mL      00                                 skin 3           Medical



     injection                                         (three)           [facility]



                                                  times           



                                                  daily           



                                                  before           



                                                  meals.           

 

     insulin      -0      Yes        15U       inject 15           U

nivers



     lispro,      6-06                               Units           ity of



     human, 100      00:00:                               under the           Te

[facility]



     unit/mL      00                                 skin 3           Medical



     injection                                         (three)           [facility]



                                                  times           



                                                  daily           



                                                  before           



                                                  meals.           

 

     insulin            Yes       [id number] 15U       inject 15           U

nivers



     lispro,      6-06                               Units           ity of



     human, 100      00:00:                               under the           Te

[facility]



     unit/mL      00                                 skin 3           Medical



     injection                                         (three)           [facility]



                                                  times           



                                                  daily           



                                                  before           



                                                  meals.           

 

     insulin      2022- No        [id number] 15U       inject 15           

[facility]



     lispro,      6 07-30                          Units           ity of



     human, 100      00:00: 00:00                          under the           T

[facility]



     unit/mL      00   :00                           skin 3           Medical



     injection                                         (three)           [facility]



                                                  times           



                                                  daily           



                                                  before           



                                                  meals.           

 

     insulin      2022- No        [id number] 15U       inject 15           

[facility]



     lispro,      6-06 07-30                          Units           ity of



     human, 100      00:00: 00:00                          under the           T

[facility]



     unit/mL      00   :00                           skin 3           Medical



     injection                                         (three)           Br[facility]



                                                  times           



                                                  daily           



                                                  before           



                                                  meals.           

 

     insulin      2022- No        [id number]       inject 52           

[facility]



     glargine       06-10                          Units           ity of



     100 unit/mL      00:00: 00:00                          under the           

[place]



     injection      00   :00                           skin at           Medical



                                                  bedtime.           Branch

 

     HYDROmorpho      2022- No             .5mg      0.5 mg,           Un

[facility]



     ne         06-06                          Slow IV           ity of



     (DILAUDID)      18:15: 18:14                          Push,           [place]



     injection      00   :00                           Q8HPRN,           Medical



     0.5 mg                                         Starting           Branch



                                                  on Sun           



                                                  22 at           



                                                  1315,           



                                                  Until Mon           



                                                  22 at           



                                                  1314,           



                                                  Routine,           



                                                  Pain           



                                                  (scale           



                                                  7-10)<br>U           



                                                  se             



                                                  approved           



                                                  by             



                                                  (Faculty):           



                                                  [facility]            



                                                  PROVIDER           

 

     insulin            Yes            .4U/kg/      52 Units           Uni

vers



     glargine      6-05                     d         (rounded           ity of



     (LANTUS      14:43:                               from 51.6           [place]



     U-100)      43                                 Units =           Medical



     injection                                         0.4            Branch



     52 Units                                         Units/kg/d           



                                                  ay ?129           



                                                  kg),           



                                                  Subcutaneo           



                                                  us, Q24H,           



                                                  First dose           



                                                  on Sun           



                                                  22 at           



                                                  0944,           



                                                  Until           



                                                  Discontinu           



                                                  ed,            



                                                  Routine           

 

     HYDROmorpho      2022-0      Yes            4mg       4 mg,           Unive

rs



     ne        6-05                               Oral,           ity of



     (DILAUDID)      02:21:                               Q6HPRN,           [place]

[facility]



     tablet 4 mg      55                                 Starting           Medi

sarah



                                                  on Sat           Branch



                                                  22 at           



                                                  1,           



                                                  Until           



                                                  Discontinu           



                                                  ed,            



                                                  Routine,           



                                                  Pain           



                                                  (scale           



                                                  4-6)           

 

     HYDROmorpho      2022- No             1mg       1 mg, Slow          

 [facility]



     ne         06-05                          IV Push,           ity of



     (DILAUDID)      02:21: 18:05                          Q4HPRN,           Eric

as



     injection 1      39   :59                           Starting           Medi

sarah



     mg                                           on Sat           Branch



                                                  22 at           



                                                  2121,           



                                                  Until Sun           



                                                  22 at           



                                                  1305,           



                                                  Routine,           



                                                  Pain           



                                                  (scale           



                                                  7-10)<br>U           



                                                  se             



                                                  approved           



                                                  by             



                                                  (Faculty):           



                                                  [facility]            



                                                  PROVIDER           

 

     Sliding            Yes                      Subcutaneo           [facility]

ers



     Scale      6-04                               us, Q4H,           ity of



     Insulin -      17:00:                               First dose           [facility]

[place]



     lispro      00                                 on Sat           Medical



     (humaLOG) +                                         22 at           [facility]



     Fsbg                                         1200,           



     Testing                                         Until           



                                                  Discontinu           



                                                  ed,            



                                                  Routine           

 

     insulin            Yes            .35U/kg      15 Units           Uni

vers



     lispro      6-04                     /d        (rounded           ity of



     (human)      17:00:                               from 15.05           [place]

s



     (HumaLOG      00                                 Units =           Medical



     U-100)                                         0.35           Branch



     injection                                         Units/kg/d           



     15 Units                                         ay ?129           



                                                  kg),           



                                                  Subcutaneo           



                                                  us, TID           



                                                  MEALS,           



                                                  First dose           



                                                  on Sat           



                                                  22 at           



                                                  1200,           



                                                  Until           



                                                  Discontinu           



                                                  ed,            



                                                  Routine           

 

     proMETHazin            Yes            25mg      25 mg,           Univ

ers



     e         6-04                               Oral,           ity of



     (PHENERGAN)      15:09:                               Q6HPRN,           Eric

as



     tablet 25      59                                 Starting           Medica

l



     mg                                           on Sat           Branch



                                                  22 at           



                                                  1009,           



                                                  Until           



                                                  Discontinu           



                                                  ed,            



                                                  Routine,           



                                                  Nausea and           



                                                  Vomiting           



                                                  (N/V)           

 

     HYDROmorpho      2022- No             4mg       4 mg,           Univ

ers



     ne         06-05                          Oral,           ity of



     (DILAUDID)      14:47: 02:22                          Q6HPRN,           Eric

as



     tablet 4 mg      19   :08                           Starting           Medi

sarah



                                                  on Sat           Branch



                                                  22 at           



                                                  0947,           



                                                  Until Sat           



                                                  22 at           



                                                  2122,           



                                                  Routine,           



                                                  Pain           



                                                  (scale           



                                                  7-10)           

 

     potassium      -0 - No             20meq      20 mEq, IV           

Univers



     chloride 20                                Piggyback,           i

ty of



     mEq/100 mL      23:45: 03:11                          Q2H, 2           [place]

[facility]



     (KCL) 20      00   :00                           doses,           Medical



     mEq/100 mL                                         First dose           Bra

nch



     RTU IVPB 20                                         on Fri           



     mEq                                          6/3/22 at           



                                                  1845, Last           



                                                  dose on           



                                                  Fri 6/3/22           



                                                  at 2000,           



                                                  100 mL           

 

     HYDROmorpho      2022- No             1mg       1 mg, Slow          

 Univers



     ne                                  IV Push,           ity of



     (DILAUDID)      18:08: 14:45                          Q4HPRN,           Eric

as



     injection 1      31   :34                           Starting           Medi

sarah



     mg                                           on Fri           Branch



                                                  6/3/22 at           



                                                  1308,           



                                                  Until Sat           



                                                  22 at           



                                                  0945,           



                                                  Routine,           



                                                  Pain           



                                                  (scale           



                                                  7-10)<br>U           



                                                  se             



                                                  approved           



                                                  by             



                                                  (Faculty):           



                                                  [facility]            



                                                  PROVIDER           

 

     enoxaparin            Yes            40mg      40 mg,           Unive

rs



     (LOVENOX)                                     Subcutaneo           ity 

of



     injection      14:00:                               us, DAILY,           [facility]

[place]



     40 mg      00                                 First dose           Medical



                                                  on Fri           Branch



                                                  6/3/22 at           



                                                  0900,           



                                                  Until           



                                                  Discontinu           



                                                  ed,            



                                                  Routine           

 

     lactobacill            Yes            .5mg      0.5 mg,           Uni

vers



     us                                       Oral, BID,           ity of



     acidophilus      13:00:                               First dose           

[place]



     tablet 0.5      00                                 on Fri           Medical



     mg                                           6/3/22 at           Branch



                                                  0800,           



                                                  Until           



                                                  Discontinu           



                                                  ed,            



                                                  Routine           

 

     docusate            Yes            100mg      100 mg,           Unive

rs



     (COLACE)                                     Oral, BID,           ity o

f



     capsule 100      13:00:                               First dose           

[place]



     mg        00                                 on Fri           Medical



                                                  6/3/22 at           Branch



                                                  0800,           



                                                  Until           



                                                  Discontinu           



                                                  ed,            



                                                  Routine           

 

     cefTRIAXone      2022- No             1000mg      1,000 mg,         

  Univers



     (ROCEPHIN)                                IV             ity of



     1,000 mg in      11:30: 16:04                          Piggyback,          

 [place]



     NaCl 0.9%      00   :40                           Q24H ABX,           Medic

al



     (NS) 50 mL                                         First dose           Bra

nch



     MINI-BAG                                         on Fri           



                                                  6/3/22 at           



                                                  0630,           



                                                  Until           



                                                  Discontinu           



                                                  ed,            



                                                  Administer           



                                                  over 30           



                                                  Minutes,           



                                                  50             



                                                  mL<br>Reas           



                                                  on for           



                                                  Anti-Infec           



                                                  tive:           



                                                  Documented           



                                                  Infection<           



                                                  br>Documen           



                                                  huma            



                                                  Infection           



                                                  Site:           



                                                  Urine<br>D           



                                                  uration of           



                                                  Therapy: 7           



                                                  days           

 

     proMETHazin       No             25mg      25 mg, IV           

Univers



     e                                   Piggyback,           ity of



     (PHENERGAN)      11:22: 15:10                          Q6HPRN,           Te

[place]



     25 mg in      38   :15                           Starting           Medical



     NaCl 0.9%                                         on Fri           Branch



     (NS) 50 mL                                         6/3/22 at           



     IV                                           0622,           



     piggyback                                         Until Sat           



                                                  22 at           



                                                  1010,           



                                                  Routine,           



                                                  Nausea and           



                                                  Vomiting           



                                                  (N/V)           

 

     acetaminoph            Yes            650mg      650 mg,           Un

[facility]



     en                                       Oral,           ity of



     (TYLENOL)      11:04:                               Q6HPRN,           [place]



     tablet 650      03                                 Starting           Medic

al



     mg                                           on Fri           Branch



                                                  6/3/22 at           



                                                  0604,           



                                                  Until           



                                                  Discontinu           



                                                  ed,            



                                                  Routine,           



                                                  Pain           



                                                  (scale           



                                                  1-3), Temp           



                                                  > 38.5 C           

 

     HYDROmorpho       No             2mg       2 mg, Slow          

 Univers



     ne                                  IV Push,           ity of



     (DILAUDID)      11:03: 18:08                          Q4HPRN,           [facility]

as



     injection 2      22   :44                           Starting           Medi

sarah



     mg                                           on Fri           Branch



                                                  6/3/22 at           



                                                  0603,           



                                                  Until Fri           



                                                  6/3/22 at           



                                                  1308,           



                                                  Routine,           



                                                  Pain           



                                                  (scale           



                                                  7-10)<br>U           



                                                  se             



                                                  approved           



                                                  by             



                                                  (Faculty):           



                                                  [facility]            



                                                  PROVIDER           

 

     NaCl 0.9%            Yes            10mL      10 mL,           Univer

s



     (NS)                                     Slow IV           ity of



     injection      10:58:                               Push, PRN,           Te

[place]



     10 mL      34                                 Starting           Medical



                                                  on Fri           Branch



                                                  6/3/22 at           



                                                  0558,           



                                                  Until           



                                                  Discontinu           



                                                  ed,            



                                                  Routine,           



                                                  line           



                                                  maintenanc           



                                                  e              

 

     NaCl 0.45%      2022- No             1000mL                     Univ

ers



     (1/2NS)                                               ity of



     1000 mL +      10:30: 14:45                                         [place]



     KCL 20 mEq      00   :34                                          Medical



                                                                 Branch

 

     D5W 0.45%      2022- No                       IV             Univers



     NaCl                                Infusion,           ity of



     (1/2NS) 1 L      10:21: 14:45                          at 200           Eric

as



     + KCL 20      33   :34                           mL/hr, PRN           Medic

al



     mEq                                          - SEE           Branch



                                                  INSTRUCTIO           



                                                  NS,            



                                                  Starting           



                                                  on Fri           



                                                  6/3/22 at           



                                                  0521,           



                                                  Until Sat           



                                                  22 at           



                                                  0945,           



                                                  ASAP,           



                                                  Blood           



                                                  glucose           



                                                  control           

 

     acetaminoph       No             1000mg      1,000 mg,         

  Univers



     en        03                          Oral,           ity of



     (TYLENOL)      08:45: 07:36                          ONCE, 1           [place]

s



     tablet      00   :00                           dose, On           Medical



     1,000 mg                                         Fri 6/3/22           Branc

h



                                                  at 0345,           



                                                  [facility]           

 

     insulin      2022- No             8U        8 Units,           Unive

rs



     regular                                Slow IV           ity of



     human      08:15: 07:13                          Push,           [place]



     (HUMULIN R)      00   :00                           ONCE, 1           Medic

al



     injection 8                                         dose, On           [facility]

ch



     Units                                         Fri 6/3/22           



                                                  at 0315,           



                                                  Routine           

 

     NaCl 0.9%       No             1000mL      at 999           Uni

vers



     (NS) bolus                                mL/hr,           ity of



     infusion      07:15: 06:12                          1,000 mL,           Eric

as



     1,000 mL      00   :00                           IV             Medical



                                                  Infusion,           [facility]



                                                  ONCE, 1           



                                                  dose, On           



                                                  Fri 6/3/22           



                                                  at 0215,           



                                                  STAT           

 

     insulin       No             8U        8 Units,           Unive

rs



     regular                                Slow IV           ity of



     human      07:15: 06:21                          Push,           [place]



     (HUMULIN R)      00   :00                           ONCE, 1           Medic

al



     injection 8                                         dose, On           [facility]

ch



     Units                                         Fri 6/3/22           



                                                  at 0215,           



                                                  STAT           

 

     iopamidol      2022- No        [id number] 100mL      100 mL,          

 [facility]



     (ISOVUE                                Intravenou           ity o

f



     370-500 mL)      05:45: 04:31                          s, ONCE, 1          

 [place]



     injection      00   :00                           dose, On           Medica

l



     100 mL                                         Fri 6/3/22           [facility]



                                                  at 0045,           



                                                  Routine           

 

     meropenem       No             500mg      500 mg, IV           

[facility]



     (MERREM)                                Piggyback,           ity 

of



     500 mg in      05:00: 05:32                          ONCE, 1           [place]

s



     NaCl 0.9%      00   :00                           dose, On           Medica

l



     (NS) 50 mL                                         Fri 6/3/22           [facility]



     MINI-BAG                                         at 0000,           



                                                  Administer           



                                                  over 30           



                                                  Minutes,           



                                                  50             



                                                  mL<br&gt;R           



                                                  estricted           



                                                  use            



                                                  approved           



                                                  by:            



                                                  EMERGENCY           



                                                  DEPARTMENT           



                                                  PRESCRIBER           



                                                  <br>Reason           



                                                  for            



                                                  Anti-Infec           



                                                  tive:           



                                                  Empiric           



                                                  Therapy           



                                                  for            



                                                  Suspected           



                                                  Infection<           



                                                  br>Empiric           



                                                  Therapy           



                                                  Site:           



                                                  Abdominal<           



                                                  br>Duratio           



                                                  n of           



                                                  therapy:           



                                                  72 hours           

 

     proMETHazin      2022- No             25mg      25 mg, IV           

[facility]



     e                                   Piggyback,           ity of



     (PHENERGAN)      04:45: 03:49                          ONCE, 1           [facility]

[place]



     25 mg in      00   :00                           dose, On           Medical



     NaCl 0.9%                                         Thu 22           Bran

ch



     (NS) 50 mL                                         at 2345,           



     IV                                           [facility]           



     piggyback                                                        

 

     NaCl 0.9%      2022- No             1000mL      at 999           Uni

vers



     (NS) bolus                                mL/hr,           ity of



     infusion      04:30: 06:04                          1,000 mL,           Eric

as



     1,000 mL      00   :00                           IV             Medical



                                                  Infusion,           [place]



                                                  ONCE, 1           



                                                  dose, On           



                                                  22           



                                                  at 2330,           



                                                  STAT           

 

     FENTanyl PF      2022- No             100ug      100 mcg,           

[facility]



     (SUBLIMAZE                                Slow IV           ity o

f



     (PF))      03:30: 03:24                          Push,           [place]



     injection      00   :00                           ONCE, 1           Medical



     100 mcg                                         dose, On           [facility] 22           



                                                  at 2230,           



                                                  STAT           

 

     cefTRIAXone            Yes            2000mg      2,000 mg,          

 [facility]



     (ROCEPHIN)      5-17                               Intramuscu           ity

 of



     injection      21:00:                               lar, Q24H,           [facility]

[place]



     2,000 mg      00                                 First dose           Medic

al



                                                  on Tue           [place]



                                                  22 at           



                                                  1600,           



                                                  Until           



                                                  Discontinu           



                                                  ed,            



                                                  ASAP<br>Re           



                                                  ason for           



                                                  Anti-Infec           



                                                  tive:           



                                                  Documented           



                                                  Infection<           



                                                  br>Documen           



                                                  huma            



                                                  Infection           



                                                  Site: Skin           



                                                  / Soft           



                                                  Tissue<br>           



                                                  Duration           



                                                  of             



                                                  Therapy:           



                                                  Other (see           



                                                  Comments)           

 

     hydromorpho            Yes            4ug       Take 4 mcg           

[facility]



     ne HCl      5-17                               by mouth           ity of



     (HYDROMORPH      17:43:                               every 12           Te

[facility]



     ONE ORAL)      55                                 (twelve)           Medica

l



                                                  hours.           

 

     hydromorpho      2022-0      Yes            4ug       Take 4 mcg           

[facility]



     ne HCl      5-17                               by mouth           ity of



     (HYDROMORPH      17:43:                               every 12           Te

[facility]



     ONE ORAL)      55                                 (twelve)           Medica

l



                                                  hours.           [facility]

 

     collagenase      -0      Yes       [id number]           Apply to         

  [facility]



     250       5-17                               affected           ity of



     unit/gram      00:00:                               area(s)           [place]



     ointment      00                                 daily.           Medical



                                                                 Branch

 

     collagenase      -0      Yes       [id number]           Apply to         

  [facility]



     250       5-17                               affected           ity of



     unit/gram      00:00:                               area(s)           [place]



     ointment      00                                 daily.           Medical



                                                                 Branch

 

     collagenase      -0      Yes       [id number]           Apply to         

  [facility]



     250       5-17                               affected           ity of



     unit/gram      00:00:                               area(s)           [place]



     ointment      00                                 daily.           Medical



                                                                 Branch

 

     collagenase      -0      Yes       [id number]           Apply to         

  [facility]



     250       5-17                               affected           ity of



     unit/gram      00:00:                               area(s)           [place]



     ointment      00                                 daily.           Medical



                                                                 Branch

 

     collagenase      -0      Yes       [id number]           Apply to         

  [facility]



     250       5-17                               affected           ity of



     unit/gram      00:00:                               area(s)           [place]



     ointment      00                                 daily.           Medical



                                                                 Branch

 

     collagenase      -0      Yes       [id number]           Apply to         

  [facility]



     250       5-17                               affected           ity of



     unit/gram      00:00:                               area(s)           [place]



     ointment      00                                 daily.           Medical



                                                                 Branch

 

     collagenase      -0      Yes       [id number]           Apply to         

  [facility]



     250       5-17                               affected           ity of



     unit/gram      00:00:                               area(s)           [place]



     ointment      00                                 daily.           Medical



                                                                 Branch

 

     collagenase      -0      Yes       [id number]           Apply to         

  [facility]



     250       5-17                               affected           ity of



     unit/gram      00:00:                               area(s)           [place]



     ointment      00                                 daily.           Medical



                                                                 Branch

 

     collagenase      -0      Yes       [id number]           Apply to         

  [facility]



     250       5-17                               affected           ity of



     unit/gram      00:00:                               area(s)           [place]



     ointment      00                                 daily.           Medical



                                                                 Branch

 

     collagenase      -0      Yes       [id number]           Apply to         

  [facility]



     250       5-17                               affected           ity of



     unit/gram      00:00:                               area(s)           [place]



     ointment      00                                 daily.           Medical



                                                                 Branch

 

     collagenase      -0      Yes       [id number]           Apply to         

  [facility]



     250       5-17                               affected           ity of



     unit/gram      00:00:                               area(s)           [place]



     ointment      00                                 daily.           Medical



                                                                 Branch

 

     collagenase      2022-0      Yes       [id number]           Apply to         

  [facility]



     250       5-17                               affected           ity of



     unit/gram      00:00:                               area(s)           [place]



     ointment      00                                 daily.           Medical



                                                                 Branch

 

     collagenase      -0      Yes       [id number]           Apply to         

  [facility]



     250       5-17                               affected           ity of



     unit/gram      00:00:                               area(s)           [place]



     ointment      00                                 daily.           Medical



                                                                 Branch

 

     collagenase      -0      Yes       [id number]           Apply to         

  [facility]



     250       5-17                               affected           ity of



     unit/gram      00:00:                               area(s)           [place]



     ointment      00                                 daily.           Medical



                                                                 Branch

 

     collagenase      -0      Yes       [id number]           Apply to         

  [facility]



     250       5-17                               affected           ity of



     unit/gram      00:00:                               area(s)           [place]



     ointment      00                                 daily.           Medical



                                                                 Branch

 

     collagenase      -0      Yes       [id number]           Apply to         

  [facility]



     250       5-17                               affected           ity of



     unit/gram      00:00:                               area(s)           [place]



     ointment      00                                 daily.           Medical



                                                                 Branch

 

     collagenase      -0      Yes       [id number]           Apply to         

  [facility]



     250       5-17                               affected           ity of



     unit/gram      00:00:                               area(s)           [place]



     ointment      00                                 daily.           Medical



                                                                 Branch

 

     collagenase      -0      Yes       [id number]           Apply to         

  [facility]



     250       5-17                               affected           ity of



     unit/gram      00:00:                               area(s)           [place]



     ointment      00                                 daily.           Medical



                                                                 Branch

 

     collagenase      -0      Yes       [id number]           Apply to         

  [facility]



     250       5-17                               affected           ity of



     unit/gram      00:00:                               area(s)           [place]



     ointment      00                                 daily.           Medical



                                                                 Branch

 

     collagenase      -0      Yes       [id number]           Apply to         

  [facility]



     250       5-17                               affected           ity of



     unit/gram      00:00:                               area(s)           [place]



     ointment      00                                 daily.           Medical



                                                                 Branch

 

     collagenase      -0      Yes       [id number]           Apply to         

  [facility]



     250       5-17                               affected           ity of



     unit/gram      00:00:                               area(s)           [place]



     ointment      00                                 daily.           Medical



                                                                 Branch

 

     collagenase      -0      Yes       [id number]           Apply to         

  [facility]



     250       5-17                               affected           ity of



     unit/gram      00:00:                               area(s)           [place]



     ointment      00                                 daily.           Medical



                                                                 Branch

 

     collagenase      -0      Yes       [id number]           Apply to         

  [facility]



     250       5-17                               affected           ity of



     unit/gram      00:00:                               area(s)           [place]



     ointment      00                                 daily.           Medical



                                                                 Branch

 

     collagenase      -0      Yes       [id number]           Apply to         

  [facility]



     250       5-17                               affected           ity of



     unit/gram      00:00:                               area(s)           [place]



     ointment      00                                 daily.           Medical



                                                                 Branch

 

     collagenase            Yes       [id number]           Apply to         

  [facility]



     250       5-17                               affected           ity of



     unit/gram      00:00:                               area(s)           [place]



     ointment      00                                 daily.           Medical



                                                                 Branch

 

     collagenase            Yes       [id number]           Apply  to        

   [facility]



     250       5-17                               affected           ity of



     unit/gram      00:00:                               area(s)           [place]



     ointment      00                                 daily.           Medical



                                                                 Branch

 

     docusate      2022- No        [id number] 100mg      Take 1           U

nivers



     100 mg      5-17 06-17                          capsule by           ity of



     capsule      00:00: 04:59                          mouth 2           [place]



               00   :00                           (two)           Medical



                                                  times           [facility]anch



                                                  daily for           



                                                  30 days.           

 

     lactobacill      2022- No        [id number] .5mg      Take 1          

 [facility]



     us        5-17 06-17                          tablet by           ity of



     acidophilus      00:00: 04:59                          mouth 2           Te

[place]



               00   :00                           (two)           Medical



                                                  times           [facility]anch



                                                  daily for           



                                                  30 days.           

 

     sennosides      2022- No        [id number] 8.6mg      Take 1          

 [facility]



     8.6 mg      5-17 06-17                          tablet by           ity of



     tablet      00:00: 04:59                          mouth 2           [place]



               00   :00                           (two)           Medical



                                                  times           [facility]



                                                  daily for           



                                                  30 days.           

 

     tamsulosin      2022- No        [id number] .4mg      Take 1           

[facility]



     0.4 mg 24      5-17 06-17                          capsule by           ity

 of



     hr capsule      00:00: 04:59                          mouth           [place]



               00   :00                           daily for           Medical



                                                  30 days.           Branch

 

     docusate      2022- No        [id number] 100mg      Take 1           U

nivers



     100 mg      5-17 06-17                          capsule by           ity of



     capsule      00:00: 04:59                          mouth 2           [place]



               00   :00                           (two)           Medical



                                                  times           [facility]



                                                  daily for           



                                                  30 days.           

 

     lactobacill      2022- No        [id number] .5mg      Take 1          

 [facility]



     us        5-17 06-17                          tablet by           ity of



     acidophilus      00:00: 04:59                          mouth 2           [facility]

[place]



               00   :00                           (two)           Medical



                                                  times           [facility]



                                                  daily for           



                                                  30 days.           

 

     sennosides      2022- No        [id number] 8.6mg      Take 1          

 [facility]



     8.6 mg      5-17 06-17                          tablet by           ity of



     tablet      00:00: 04:59                          mouth 2           [place]



               00   :00                           (two)           Medical



                                                  times           [facility]



                                                  daily for           



                                                  30 days.           

 

     tamsulosin      2022- No        [id number] .4mg      Take 1           

[facility]



     0.4 mg 24      5-17 06-17                          capsule by           [facility]

 of



     hr capsule      00:00: 04:59                          mouth           [place]



               00   :00                           daily for           Medical



                                                  30 days.           [facility]

 

     docusate      2022- No        [id number] 100mg      Take 1           [facility]

[facility]



     100 mg      5-17 06-17                          capsule by           ity of



     capsule      00:00: 04:59                          mouth 2           [place]



               00   :00                           (two)           Medical



                                                  times           [place]



                                                  daily for           



                                                  30 days.           

 

     lactobacill      2022- No        [id number] .5mg      Take 1          

 [facility]



     us        5-17 06-17                          tablet by           [facility] of



     acidophilus      00:00: 04:59                          mouth 2           [place]



               00   :00                           (two)           [facility]



                                                  times           [place]



                                                  daily for           



                                                  30 days.           

 

     sennosides      2022- No        [id number] 8.6mg      Take 1          

 [facility]



     8.6 mg      5-17 06-17                          tablet by           [facility] of



     tablet      00:00: 04:59                          mouth 2           [place]



               00   :00                           (two)           Medical



                                                  times           [facility]



                                                  daily for           



                                                  30 days.           

 

     tamsulosin      2022- No        [id number] .4mg      Take 1           

[facility]



     0.4 mg 24      5-17 06-17                          capsule by           ity

 of



     hr capsule      00:00: 04:59                          mouth           [place]



               00   :00                           daily for           Medical



                                                  30 days.           [facility]

 

     docusate      2022- No        [id number] 100mg      Take 1           [facility]

[facility]



     100 mg      5-17 06-17                          capsule by           ity of



     capsule      00:00: 04:59                          mouth 2           [place]



               00   :00                           (two)           Medical



                                                  times           [place]



                                                  daily for           



                                                  30 days.           

 

     lactobacill      2022- No        [id number] .5mg      Take 1          

 [facility]



     us        5-17 06-17                          tablet by           [facility] of



     acidophilus      00:00: 04:59                          mouth 2           [facility]

[place]



               00   :00                           (two)           [facility]



                                                  times           [facility]



                                                  daily for           



                                                  30 days.           

 

     sennosides      2022- No        [id number] 8.6mg      Take 1          

 [facility]



     8.6 mg      5-17 06-17                          tablet by           ity of



     tablet      00:00: 04:59                          mouth 2           [place]



               00   :00                           (two)           Medical



                                                  times           [facility]



                                                  daily for           



                                                  30 days.           

 

     tamsulosin      2022- No        [id number] .4mg      Take 1           

[facility]



     0.4 mg 24      5-17 06-17                          capsule by           ity

 of



     hr capsule      00:00: 04:59                          mouth           [place]



               00   :00                           daily for           Medical



                                                  30 days.           [facility]

 

     docusate      2022- No        [id number] 100mg      Take 1           [facility]

[facility]



     100 mg      5-17 06-17                          capsule by           ity of



     capsule      00:00: 04:59                          mouth 2           [place]



               00   :00                           (two)           Medical



                                                  times           [facility]



                                                  daily for           



                                                  30 days.           

 

     lactobacill      2022- No        [id number] .5mg      Take 1          

 [facility]



     us        5-17 06-17                          tablet by           ity of



     acidophilus      00:00: 04:59                          mouth 2           [facility]

[place]



               00   :00                           (two)           Medical



                                                  times           [facility]



                                                  daily for           



                                                  30 days.           

 

     sennosides      2022- No        [id number] 8.6mg      Take 1          

 [facility]



     8.6 mg      5-17 -17                          tablet by           ity of



     tablet      00:00: 04:59                          mouth 2           [place]



               00   :00                           (two)           Medical



                                                  times           [facility]



                                                  daily for           



                                                  30 days.           

 

     tamsulosin      2022- No        [id number] .4mg      Take 1           

[facility]



     0.4 mg 24      5-17 -17                          capsule by           [facility]

 of



     hr capsule      00:00: 04:59                          mouth           [place]



               00   :00                           daily for           Medical



                                                  30 days.           [facility]

 

     metroNIDAZO      2022- No        [id number] 500mg      Take 1         

  [facility]



      mg      5-17 05-21                          tablet by           ity 

of



     tablet      00:00: 04:59                          mouth           [place]



               00   :00                           every 12           Medical



                                                  (twelve)           [facility]



                                                  hours for           



                                                  3 days.           

 

     metroNIDAZO      2022- No        [id number] 500mg      Take 1         

  [facility]



      mg      5-17 05-21                          tablet by           ity 

of



     tablet      00:00: 04:59                          mouth           [place]



               00   :00                           every 12           Medical



                                                  (twelve)           [facility]



                                                  hours for           



                                                  3 days.           

 

     metroNIDAZO      2022- No             500mg      500 mg,           U

n[facility]



     LE (FLAGYL)      5-15 05-20                          Oral, Q12H           i

ty of



     tablet 500      22:00: 21:59                          ABX, 10           [facility]

[facility]



     mg        00   :00                           doses,           Medical



                                                  First dose           [facility]



                                                  on Sun           



                                                  5/15/22 at           



                                                  1700, Last           



                                                  dose on           



                                                  22 at           



                                                  0500,           



                                                  Routine<br           



                                                  >Reason           



                                                  for            



                                                  Anti-Infec           



                                                  tive:           



                                                  Documented           



                                                  Infection<           



                                                  br>Documen           



                                                  huma            



                                                  Infection           



                                                  Site: Skin           



                                                  / Soft           



                                                  Tissue<br>           



                                                  Duration           



                                                  of             



                                                  Therapy:           



                                                  Other (see           



                                                  Comments)           

 

     cefTRIAXone      2022- No             2g        2 g, IV           [facility]

[facility]



     (ROCEPHIN)      5-15 05-17                          Piggyback,           it

y of



     2 g in NaCl      20:00: 20:03                          Q24H ABX,           

[place]



     0.9% (NS)      00   :21                           3 doses,           [facility]

l



     100 mL                                         First dose           [facility]



     MINI-BAG                                         on Sun           



                                                  5/15/22 at           



                                                  1500, Last           



                                                  dose on           



                                                  22 at           



                                                  1500,           



                                                  Administer           



                                                  over 30           



                                                  Minutes,           



                                                  100            



                                                  mL<br>Reas           



                                                  on for           



                                                  Anti-Infec           



                                                  tive:           



                                                  Documented           



                                                  Infection<           



                                                  br>Documen           



                                                  huma            



                                                  Infection           



                                                  Site: Skin           



                                                  / Soft           



                                                  Tissue<br>           



                                                  Duration           



                                                  of             



                                                  Therapy:           



                                                  Other (see           



                                                  Comments)           

 

     enoxaparin            Yes            30mg      30 mg,           Unive

rs



     (LOVENOX)                                     Subcutaneo           ity 

of



     injection      01:00:                               us, Q12H,           [facility]

[facility]



     30 mg      00                                 First dose           Medical



                                                  on Wed           [facility]



                                                  22 at           



                                                  2000,           



                                                  Until           



                                                  Discontinu           



                                                  ed,            



                                                  Routine           

 

     proMETHazin            Yes            25mg      25 mg, IV           U

nivers



     e                                        Piggyback,           ity of



     (PHENERGAN)      22:58:                               Q4HPRN,           [facility]

as



     25 mg in      13                                 Starting           Medical



     NaCl 0.9%                                         on 



     (NS) 50 mL                                         22 at           



     IV                                           1758,           



     piggyback                                         Until           



                                                  Discontinu           



                                                  ed,            



                                                  Routine,           



                                                  Nausea and           



                                                  Vomiting           



                                                  (N/V)           

 

     collagenase            Yes                      Topical           Uni

vers



     (SANTYL)                                     (Apply To           [facility] of



     ointment      22:15:                               Affected           [place]



               00                                 Areas),           Medical



                                                  DAILY,           [facility]



                                                  First dose           



                                                  on 22 at           



                                                  1715,           



                                                  Until           



                                                  Discontinu           



                                                  ed,            



                                                  Routine           

 

     HYDROmorpho      2022- No             1mg       1 mg, Slow          

 [facility]



     ne                                  IV Push,           [facility] of



     (DILAUDID)      21:45: 21:25                          ONCE, 1           [facility]

as



     injection 1      00   :00                           dose, On           Medi

[facility]



     mg                                           22 at           



                                                  1645,           



                                                  Routine<br           



                                                  >Use           



                                                  approved           



                                                  by             



                                                  (Faculty):           



                                                  ADC            



                                                  PROVIDER           

 

     magnesium      2022- No             2g        2 g, IV           [facility]

ers



     sulfate in                                Piggyback,           it

y of



     water 2      15:15: 16:11                          Administer           [facility]

as



     gram/50 mL      00   :00                           over 60           [facility]

l



     (4 %)                                         Minutes,           [facility]



     infusion 2                                         ONCE, 1           



     g                                            dose, On           



                                                  22 at           



                                                  1015,           



                                                  Routine           

 

     lactulose      0      Yes            15mL      15 mL,           [facility]

s



     (CEPHULAC)                                     Oral,           [facility]



     solution 15      14:00:                               DAILY,           [place]

[place]



     mL        00                                 First dose           Medical



                                                  on Wed           [facility]



                                                  22 at           



                                                  0900,           



                                                  Until           



                                                  Discontinu           



                                                  ed,            



                                                  Routine           

 

     vancomycin      2022- No             125mg      125 mg,           [facility]

[facility]



     (FIRVANQ)                                Oral,           ity of



     50 mg/mL      14:00: 16:27                          Q24H,           [place]



     oral      00   :02                           First dose           Medical



     solution                                         on 



     125 mg                                         22 at           



                                                  0900,           



                                                  Until           



                                                  Discontinu           



                                                  ed,            



                                                  Routine<br           



                                                  >Reason           



                                                  for            



                                                  Anti-Infec           



                                                  tive:           



                                                  Empiric           



                                                  Non-Surgic           



                                                  al             



                                                  Prophylaxi           



                                                  s<br>Durat           



                                                  ion of           



                                                  therapy: 7           



                                                  days           

 

     sennosides            Yes            8.6mg      8.6 mg,           Uni

vers



     (SENOKOT)                                     Oral, BID,           ity 

of



     tablet 8.6      13:00:                               First dose           [facility]

[place]



     mg        00                                 on Wed           [facility]



                                                  22 at           [facility]



                                                  0800,           



                                                  Until           



                                                  Discontinu           



                                                  ed,            



                                                  Routine           

 

     docusate      0      Yes            100mg      100 mg,           Unive

rs



     (COLACE)                                     Oral, BID,           ity o

f



     capsule 100      13:00:                               First dose           

[place]



     mg        00                                 on Wed           Medical



                                                  22 at           [facility]



                                                  0800,           



                                                  Until           



                                                  Discontinu           



                                                  ed,            



                                                  Routine           

 

     HYDROmorpho      0      Yes            2mg       2 mg,           Unive

rs



     ne                                       Oral, TID,           ity of



     (DILAUDID)      13:00:                               First dose           T

[facility]



     tablet 2 mg      00                                 on Wed           [facility]

l



                                                  22 at           [facility]



                                                  0800,           



                                                  Until           



                                                  Discontinu           



                                                  ed             

 

     lactobacill      0      Yes            .5mg      0.5 mg,           Uni

vers



     us        -                               Oral, BID,           ity of



     acidophilus      13:00:                               First dose           

[place]



     tablet 0.5      00                                 on Wed           Medical



     mg                                           22 at           [facility]



                                                  0800,           



                                                  Until           



                                                  Discontinu           



                                                  ed,            



                                                  Routine           

 

     ceFEPIme      2022- No             2g        2 g, IV           Unive

rs



     (MAXIPIME)      15                          Piggyback,           it

y of



     2 g in NaCl      11:00: 19:23                          Q8H ABX,           [facility]

[place]



     0.9% (NS)      00   :40                           First dose           Medi

[facility]



     100 mL                                         on Wed           [facility]



     MINI-BAG                                         22 at           



                                                  0600,           



                                                  Until           



                                                  Discontinu           



                                                  ed,            



                                                  Administer           



                                                  over 30           



                                                  Minutes,           



                                                  100            



                                                  mL<br>Reas           



                                                  on for           



                                                  Anti-Infec           



                                                  tive:           



                                                  Empiric           



                                                  Therapy           



                                                  for            



                                                  Suspected           



                                                  Infection<           



                                                  br&gt;Empi           



                                                  alda            



                                                  Therapy           



                                                  Site:           



                                                  Bone<br>Du           



                                                  ration of           



                                                  therapy:           



                                                  72 hours           

 

     NaCl 0.9%      2022- No             1000mL      at 50           Univ

ers



     (NS) IV       05-16                          mL/hr, IV           ity of



     infusion      10:00: 16:41                          Infusion,           Eric

as



     1,000 mL      00   :28                           CONTINUOUS           Medic

al



                                                  , Starting           Branch



                                                  on 22 at           



                                                  0500,           



                                                  Until 22 at           



                                                  1141,           



                                                  Routine           

 

     doxycycline      2022- No             100mg      100 mg, IV         

  [facility]



     (VIBRAMYCIN      14                          Piggyback,           i

ty of



     ) 100 mg in      09:15: 23:11                          Q12H ABX,           

[place]



     NaCl 0.9%      00   :48                           First dose           Medi

sarah



     (NS) 100 mL                                         on Wed           Branch



     MINI-BAG                                         22 at           



                                                  0415,           



                                                  Until           



                                                  Discontinu           



                                                  ed,            



                                                  Administer           



                                                  over 60           



                                                  Minutes,           



                                                  100            



                                                  mL<br>Reas           



                                                  on for           



                                                  Anti-Infec           



                                                  tive:           



                                                  Empiric           



                                                  Therapy           



                                                  for            



                                                  Suspected           



                                                  Infection<           



                                                  br>Empiric           



                                                  Therapy           



                                                  Site:           



                                                  Wound<br>D           



                                                  uration of           



                                                  therapy: 7           



                                                  days           

 

     tamsulosin            Yes            .4mg      0.4 mg,           Univ

ers



     (FLOMAX)                                     Oral,           ity of



     capsule 0.4      08:30:                               DAILY,           [place]

[facility]



     mg        00                                 First dose           Medical



                                                  on Wed           Branch



                                                  22 at           



                                                  0330,           



                                                  Until           



                                                  Discontinu           



                                                  ed,            



                                                  Routine           

 

     HYDROmorpho            Yes            1mg       1 mg, Slow           

[facility]



     ne                                       IV Push,           ity of



     (DILAUDID)      06:04:                               Q4HPRN,           [place]

[facility]



     injection 1      07                                 Starting           Medi

sarah



     mg                                           on 22 at           



                                                  0104,           



                                                  Until           



                                                  Discontinu           



                                                  ed,            



                                                  Routine,           



                                                  Pain           



                                                  (scale           



                                                  7-10)<br>U           



                                                  se             



                                                  approved           



                                                  by             



                                                  (Faculty):           



                                                  ADC            



                                                  PROVIDER           

 

     FENTanyl PF       No             50ug      50 mcg,           Un

yos[facility]



     (SUBLIMAZE                                Slow IV           ity o

f



     (PF))      05:30: 05:01                          Push,           [place]



     injection      00   :00                           ONCE, 1           Medical



     50 mcg                                         dose, On           Branch



                                                  22 at           



                                                  0030, STAT           

 

     iopamidol      2022- No        [id number] 150mL      150 mL,         

  [facility]



     (ISOVUE                                Intravenou           ity o

f



     370-500 mL)      04:45: 03:34                          s, ONCE, 1          

 [place]



     injection      00   :00                           dose, On           Medica

l



     150 mL                                         e            [place]



                                                  5/10/22 at           



                                                  2345,           



                                                  Routine           

 

     ceFEPIme      2022- No             2000mg      2,000 mg,           U

nivers



     (MAXIPIME)                                IV             ity of



     injection      04:15: 04:15                          Piggyback,           T

[place]



     2,000 mg      00   :00                           ONCE, 1           Medical



                                                  dose, On           Branch



                                                  Tue            



                                                  5/10/22 at           



                                                  2315,           



                                                  STAT<br>Re           



                                                  ason for           



                                                  Anti-Infec           



                                                  tive:           



                                                  Empiric           



                                                  Therapy           



                                                  for            



                                                  Suspected           



                                                  Infection<           



                                                  br>Empiric           



                                                  Therapy           



                                                  Site:           



                                                  Bone<br>Du           



                                                  ration of           



                                                  therapy:           



                                                  72 hours           

 

     FENTanyl PF       No             75ug      75 mcg,           Un

yoselyn



     (SUBLIMAZE                                Slow IV           ity o

f



     (PF))      03:45: 02:47                          Push,           [place]



     injection      00   :00                           ONCE, 1           Medical



     75 mcg                                         dose, On           Branch



                                                  Tue            



                                                  5/10/22 at           



                                                  2245, STAT           

 

     OXYCODONE      2022- No                       Take by           [facility]

ers



     HCL/ACETAMI                                mouth.           ity o

f



     NOPHEN      03:22: 00:00                                         [place]



     (PERCOCET      50   :00                                          Medical



     ORAL)                                                        [place]

 

     FENTanyl PF      2022- No             100ug      100 mcg,           

[facility]



     (SUBLIMAZE                                Slow IV           ity o

f



     (PF))      01:30: 01:12                          Push,           [place]



     injection      00   :00                           ONCE, 1           Medical



     100 mcg                                         dose, On           Branch



                                                  Tue            



                                                  5/10/22 at           



                                                  2030, STAT           

 

     Lovenox            No                       Notes:           [facility]



               4-13                               (Same as:           l



               17:00:                               Lovenox)                                                           

 

     Lovenox            No                       Notes:           [facility]



               4-13                               (Same as:           l



               17:00:                               Lovenox)                                                           

 

     promethazin            No                       Notes:           [facility]

rajeev



     e         4-13                               (Same as:           l



               16:47:                               Phenergan)                                                           

 

     promethazin            No                       Notes:           [facility]

rajeev



     e         4-13                               (Same as:           l



               16:47:                               Phenergan)                                                           

 

     albuterol            No                       Notes: SEE           Me

moria



     0.083%      4-13                               RT             l



     inhalation      16:46:                               DOCUMENTAT           H

ermann



     solution      00                                 ION (Same           



                                                  as:            



                                                  Proventil)           

 

     albuterol            No                       Notes: SEE           Me

moria



     0.083%      4-13                               RT             l



     inhalation      16:46:                               DOCUMENTAT           H

ermann



     solution      00                                 ION (Same           



                                                  as:            



                                                  Proventil)           

 

     hydromorpho            Yes                      4 mg = 1           Me

moria



     ne 4 mg      4-13                               tab, PO,           l



     oral tablet      16:43:                               Q12H, 0           Her

child



               00                                 Refill(s)           

 

     hydromorpho            Yes                      4 mg = 1           Me

moria



     ne 4 mg      4-13                               tab, PO,           l



     oral tablet      16:43:                               Q12H, 0           Her

child



               00                                 Refill(s)           

 

     Lantus 100      0      No                       10 unit,           Mem

oria



     units/mL      4-13                               SUB-Q,           l



               16:40:                               Daily, 0           [place]



               00                                 Refill(s)           

 

     Lantus 100      0      No                       10 unit,           Mem

[facility]



     units/mL      4-13                               SUB-Q,           l



               16:40:                               Daily, 0           [place]



               00                                 Refill(s)           

 

     Lantus 100      0      No                       10 unit,           Mem

oria



     units/mL      4-12                               0.1 mL,           l



               14:00:                               Route:           [facility]



               00                                 SUB-Q,           



                                                  Drug form:           



                                                  SOLN,           



                                                  Daily,           



                                                  Dosing           



                                                  Weight           



                                                  127.727,           



                                                  kg, Start           



                                                  date:           



                                                  22           



                                                  9:00:00           



                                                  CDT,           



                                                  Duration:           



                                                  30 day,           



                                                  Stop date:           



                                                  22           



                                                  9:00:00           



                                                  CDT,           



                                                  Infuse           



                                                  over: 0           



                                                  hr, 0           

 

     Lantus 100            No                       10 unit,           Mem

[facility]



     units/mL      4-12                               0.1 mL,           l



               14:00:                               Route:           [facility]



               00                                 SUB-Q,           



                                                  Drug form:           



                                                  SOLN,           



                                                  Daily,           



                                                  Dosing           



                                                  Weight           



                                                  127.727,           



                                                  kg, Start           



                                                  date:           



                                                  22           



                                                  9:00:00           



                                                  CDT,           



                                                  Duration:           



                                                  30 day,           



                                                  Stop date:           



                                                  22           



                                                  9:00:00           



                                                  CDT,           



                                                  Infuse           



                                                  over: 0           



                                                  hr, 0           

 

     cefepime +            No                       Notes:           [facility]

ia



     sterile      4-12                               (Same As:           l



     water 10 mL      06:00:                               Maxipime)           H

erm



               00                                 ***            



                                                  MEDICATION           



                                                  WASTE ***           



                                                  Product           



                                                  Size: 1000           



                                                  mg Product           



                                                  Wasted:           



                                                  _0__ mg           

 

     cefepime +            No                       Notes:           [facility]

ia



     sterile      4-12                               (Same As:           l



     water 10 mL      06:00:                               Maxipime)           H

erm                                 ***            



                                                  MEDICATION           



                                                  WASTE ***           



                                                  Product           



                                                  Size: 1000           



                                                  mg Product           



                                                  Wasted:           



                                                  _0__ mg           

 

     cefepime +            No                       Notes:           [facility]

ia



     sterile      4-12                               (Same As:           l



     water 10 mL      05:00:                               Maxipime)           H

erm                                 ***            



                                                  MEDICATION           



                                                  WASTE ***           



                                                  Product           



                                                  Size: 1000           



                                                  mg Product           



                                                  Wasted:           



                                                  _0__ mg           

 

     insulin            No                       Notes:           [facility]



     lispro      4-12                               (Same as:           l



               05:00:                               Humalog)                                            Roll in           



                                                  palms of           



                                                  hands           



                                                  gently; Do           



                                                  not shake           



                                                  vigorously           



                                                  . WASTE:           



                                                  F/P -           



                                                  Black; E -           



                                                  Municipal           



                                                  Trash Bin           



                                                  Stable for           



                                                  28 days at           



                                                  room           



                                                  temperatur           



                                                  e. Expires           



                                                  in _____           



                                                  days from           



                                                  __________           



                                                  ____Date           

 

     cefepime +            No                       Notes:           [facility]

ia



     sterile      4-12                               (Same As:           l



     water 10 mL      05:00:                               Maxipime)           H

erm                                 ***            



                                                  MEDICATION           



                                                  WASTE ***           



                                                  Product           



                                                  Size: 1000           



                                                  mg Product           



                                                  Wasted:           



                                                  _0__ mg           

 

     insulin            No                       Notes:           [facility]



     lispro      4-12                               (Same as:           l



               05:00:                               Humalog)           [place]                                 Roll in           



                                                  palms of           



                                                  hands           



                                                  gently; Do           



                                                  not shake           



                                                  vigorously           



                                                  . WASTE:           



                                                  F/P -           



                                                  Black; E -           



                                                  Municipal           



                                                  Trash Bin           



                                                  Stable for           



                                                  28 days at           



                                                  room           



                                                  temperatur           



                                                  e. Expires           



                                                  in _____           



                                                  days from           



                                                  __________           



                                                  ____Date           

 

     Dilaudid            No                       Notes:           [facility]



               4-11                               (Same as:           l



               16:53:                               Dilaudid)                                                           

 

     Dilaudid            No                       Notes:           [facility]



               4-11                               (Same as:           l



               16:53:                               Dilaudid)                                                           

 

     Lantus 100            No                       10 unit,           Mem

oria



     units/mL      4-11                               0.1 mL,           l



               16:24:                               Route:                                            SUB-Q,           



                                                  Drug form:           



                                                  SOLN,           



                                                  ONCE,           



                                                  Dosing           



                                                  Weight           



                                                  127.727,           



                                                  kg, Start           



                                                  date:           



                                                  22           



                                                  11:24:00           



                                                  CDT, Stop           



                                                  date:           



                                                  22           



                                                  11:24:00           



                                                  CDT,           



                                                  Infuse           



                                                  over: 0           



                                                  hr, 0           

 

     Lantus 100            No                       10 unit,           [facility]

[facility]



     units/mL      4-11                               0.1 mL,           l



               16:24:                               Route:                                            SUB-Q,           



                                                  Drug form:           



                                                  SOLN,           



                                                  ONCE,           



                                                  Dosing           



                                                  Weight           



                                                  127.727,           



                                                  kg, Start           



                                                  date:           



                                                  22           



                                                  11:24:00           



                                                  CDT, Stop           



                                                  date:           



                                                  22           



                                                  11:24:00           



                                                  CDT,           



                                                  Infuse           



                                                  over: 0           



                                                  hr, 0           

 

     NS (Bolus)            No                       500 mL,           [facility]

[facility]



     IV        4-11                               500 ml/hr,           l



               15:15:                               Infuse                                            Over: 1           



                                                  hr, Route:           



                                                  IV, 500,           



                                                  Drug form:           



                                                  INJ, ONCE,           



                                                  Priority:           



                                                  STAT,           



                                                  Dosing           



                                                  Weight           



                                                  127.727           



                                                  kg, Start           



                                                  date:           



                                                  22           



                                                  10:15:00           



                                                  CDT, Stop           



                                                  date:           



                                                  22           



                                                  10:15:00           



                                                  CDT, 0           

 

     NS (Bolus)            No                       500 mL,           [facility]

[facility]



     IV        4-11                               500 ml/hr,           l



               15:15:                               Infuse                                            Over: 1           



                                                  hr, Route:           



                                                  IV, 500,           



                                                  Drug form:           



                                                  INJ, ONCE,           



                                                  Priority:           



                                                  STAT,           



                                                  Dosing           



                                                  Weight           



                                                  127.727           



                                                  kg, Start           



                                                  date:           



                                                  22           



                                                  10:15:00           



                                                  CDT, Stop           



                                                  date:           



                                                  22           



                                                  10:15:00           



                                                  CDT, 0           

 

     Dextrose            No                       12.5 gm,           [facility]

[facility]



     50% Syringe      4-11                               25 mL,           l



     (D50W)      13:59:                               Route:           [place]



               00                                 IVP, Drug           



                                                  Form: INJ,           



                                                  Dosing           



                                                  Weight           



                                                  127.727,           



                                                  kg, PRN,           



                                                  PRN Blood           



                                                  Glucose           



                                                  Results,           



                                                  Start           



                                                  date:           



                                                  22           



                                                  8:59:00           



                                                  CDT,           



                                                  Duration:           



                                                  30 day,           



                                                  Stop date:           



                                                  22           



                                                  8:58:00           



                                                  CDT, 0           

 

     glucagon      202-0      No                       1 mg,           [facility]



               4-11                               Route: IM,           l



               13:59:                               Drug form:           [place]



               00                                 PDR/INJ,           



                                                  PRN,           



                                                  Dosing           



                                                  Weight           



                                                  127.727,           



                                                  kg, PRN           



                                                  Blood           



                                                  Glucose           



                                                  Results,           



                                                  Start           



                                                  date:           



                                                  22           



                                                  8:59:00           



                                                  CDT,           



                                                  Duration:           



                                                  30 day,           



                                                  Stop date:           



                                                  22           



                                                  8:58:00           



                                                  CDT, 0           

 

     insulin      -0      No                       Notes:           [facility]



     lispro      4-11                               (Same as:           l



               13:59:                               Humalog)           [facility]                                 Roll in           



                                                  palms of           



                                                  hands           



                                                  gently; Do           



                                                  not shake           



                                                  vigorously           



                                                  . WASTE:           



                                                  F/P -           



                                                  Black; E -           



                                                  Municipal           



                                                  Trash Bin           



                                                  Stable for           



                                                  28 days at           



                                                  room           



                                                  temperatur           



                                                  e. Expires           



                                                  in _____           



                                                  days from           



                                                  __________           



                                                  ____Date           

 

     Dextrose      -0      No                       12.5 gm,           [facility]

[facility]



     50% Syringe      4-11                               25 mL,           l



     (D50W)      13:59:                               Route:           [facility]



               00                                 IVP, Drug           



                                                  Form: INJ,           



                                                  Dosing           



                                                  Weight           



                                                  127.727,           



                                                  kg, PRN,           



                                                  PRN Blood           



                                                  Glucose           



                                                  Results,           



                                                  Start           



                                                  date:           



                                                  22           



                                                  8:59:00           



                                                  CDT,           



                                                  Duration:           



                                                  30 day,           



                                                  Stop date:           



                                                  22           



                                                  8:58:00           



                                                  CDT, 0           

 

     glucagon      -0      No                       1 mg,           [facility]



               4-11                               Route: IM,           l



               13:59:                               Drug form:           [facility]



               00                                 PDR/INJ,           



                                                  PRN,           



                                                  Dosing           



                                                  Weight           



                                                  127.727,           



                                                  kg, PRN           



                                                  Blood           



                                                  Glucose           



                                                  Results,           



                                                  Start           



                                                  date:           



                                                  22           



                                                  8:59:00           



                                                  CDT,           



                                                  Duration:           



                                                  30 day,           



                                                  Stop date:           



                                                  22           



                                                  8:58:00           



                                                  CDT, 0           

 

     insulin      -0      No                       Notes:           [facility]



     lispro      4-11                               (Same as:           l



               13:59:                               Humalog)           [facility]



               00                                 Roll in           



                                                  palms of           



                                                  hands           



                                                  gently; Do           



                                                  not shake           



                                                  vigorously           



                                                  . WASTE:           



                                                  F/P -           



                                                  Black; E -           



                                                  Municipal           



                                                  Trash Bin           



                                                  Stable for           



                                                  28 days at           



                                                  room           



                                                  temperatur           



                                                  e. Expires           



                                                  in _____           



                                                  days from           



                                                  __________           



                                                  ____Date           

 

     Lyrica            No                       Notes:           [facility]



               4-11                               (Same as:           l



               02:00:                               Lyrica)                                                           

 

     Lyrica            No                       Notes:           [facility]



               4-11                               (Same as:           l



               02:00:                               Lyrica)                                                           

 

     valproic            No                       Notes:           [facility]



     acid 100      4-10                               Dilute in           l



     mg/mL      17:00:                               at least           [facility]



     intravenous      00                                 50ml D5W           



     solution +                                         or NS.           



     Sodium                                         Infusion           



     Chloride                                         rate = 20           



     0.9% IV 50                                         mg/min           



     mL                                           (Same As:           



                                                  [facility])           



                                                  Hazardous           



                                                  Drug Group           



                                                  3:Reproduc           



                                                  tive risk           



                                                  Hazardous           



                                                  Drug --           



                                                  Refer to           



                                                  safe           



                                                  handling           



                                                  procedure           



                                                  P[facility] [employer]           

 

     valproic            No                       Notes:           [facility]



     acid 100      4-10                               Dilute in           l



     mg/mL      17:00:                               at least           [place]



     intravenous      00                                 50ml D5W           



     solution +                                         or NS.           



     Sodium                                         Infusion           



     Chloride                                         rate = 20           



     0.9% IV 50                                         mg/min           



     mL                                           (Same As:           



                                                  [facility])           



                                                  Hazardous           



                                                  Drug Group           



                                                  3:Reproduc           



                                                  tive risk           



                                                  Hazardous           



                                                  Drug --           



                                                  Refer to           



                                                  safe           



                                                  handling           



                                                  procedure           



                                                  PPE [employer]           

 

     Solu-MEDROL            No                       Notes:           [facility]

[facility]



               4-10                               (Same           l



               16:43:                               as:Solu-ME           [facility]



               00                                 DROL,           



                                                  A-Methapre           



                                                  d) ***           



                                                  MEDICATION           



                                                  WASTE ***           



                                                  Product           



                                                  Size: 1000           



                                                  mg Product           



                                                  Wasted:           



                                                  _0__ mg           

 

     magnesium            No                       Notes:           [facility]

a



     sulfate      4-10                               WASTE: F/P           l



               16:43:                               - Sink; E                                            -              



                                                  Municipal           



                                                  Trash Bin           

 

     Solu-MEDROL            No                       Notes:           [facility]

[facility]



               4-10                               (Same           l



               16:43:                               as:Solu-ME           [facility]



               00                                 DROL,           



                                                  A-Methapre           



                                                  d) ***           



                                                  MEDICATION           



                                                  WASTE ***           



                                                  Product           



                                                  Size: 1000           



                                                  mg Product           



                                                  Wasted:           



                                                  _0__ mg           

 

     magnesium            No                       Notes:           [facility]

a



     sulfate      4-10                               WASTE: F/P           l



               16:43:                               - Sink; E                                            -              



                                                  Municipal           



                                                  Trash Bin           

 

     amitriptyli            No                       Notes:           [facility]

[facility]



     ne        4-10                               (Same as:           l



               02:00:                               Elavil)                                                           

 

     amitriptyli            No                       Notes:           [facility]

[facility]



     ne        4-10                               (Same as:           l



               02:00:                               Elavil)                                                           

 

     SUMAtriptan            No                       Notes:           [facility]

[facility]



               4-09                               (Same As:           l



               18:45:                               Imitrex)                                                           

 

     SUMAtriptan            No                       Notes:           [facility]

[facility]



               4-09                               (Same As:           l



               18:45:                               Imitrex)                                                           

 

     promethazin            No                       6.25 mg,           [facility]

[facility]



     e         4-09                               Route:           l



               18:44:                               IVPB,           [place]



               00                                 Q12H,           



                                                  Dosing           



                                                  Weight           



                                                  127.727,           



                                                  kg, PRN           



                                                  Nausea &           



                                                  Vomiting,           



                                                  Start           



                                                  date:           



                                                  22           



                                                  13:44:00           



                                                  CDT,           



                                                  Duration:           



                                                  30 day,           



                                                  Stop date:           



                                                  22           



                                                  13:43:00           



                                                  CDT            

 

     promethazin            No                       6.25 mg,           Me

moria



     e         4-09                               Route:           l



               18:44:                               IVPB,           [facility]



               00                                 Q12H,           



                                                  Dosing           



                                                  Weight           



                                                  127.727,           



                                                  kg, PRN           



                                                  Nausea &           



                                                  Vomiting,           



                                                  Start           



                                                  date:           



                                                  22           



                                                  13:44:00           



                                                  CDT,           



                                                  Duration:           



                                                  30 day,           



                                                  Stop date:           



                                                  22           



                                                  13:43:00           



                                                  CDT            

 

     Benadryl            No                       Notes:           [facility]



               4-09                               (Same as:           l



               18:37:                               Benadryl)                                                           

 

     Benadryl            No                       Notes:           [facility]



               4-09                               (Same as:           l



               18:37:                               Benadryl)                                                           

 

     Dilaudid            No                       Notes:           [facility]



               4-09                               (Same as:           l



               18:36:                               Dilaudid)                                                           

 

     Dilaudid            No                       Notes:           [facility]



               4-09                               (Same as:           l



               18:36:                               Dilaudid)                                                           

 

     Phenergan            No                       Notes:           [facility]

a



               4-09                               (Same as:           l



               18:33:                               Phenergan)                                            6.25 mg =           



                                                  1/2 x 12.5           



                                                  mg TAB           

 

     Phenergan            No                       Notes:           [facility]

a



               4-09                               (Same as:           l



               18:33:                               Phenergan)           [place]



               00                                 6.25 mg =           



                                                  1/2 x 12.5           



                                                  mg TAB           

 

     ascorbic            No                       Notes:           [facility]



     acid      4-09                               (Same as:           l



               14:00:                               Vitamin C)           [place]



                                                               

 

     celecoxib            No                       Notes:           [facility]

a



               4-09                               NSAID.           l



               14:00:                               Please           [facility]



               00                                 check           



                                                  indication           



                                                  . Not for           



                                                  seizure.           



                                                  (Same As:           



                                                  CeleBREX)           

 

     Lidoderm 5%            No                       Notes:           [facility]

[facility]



     topical                                     Apply only           l



     film      14:00:                               once for           [facility]



     (patch)      00                                 up to 12           



                                                  hours in a           



                                                  24-hour           



                                                  period (12           



                                                  hours on           



                                                  and 12           



                                                  hours           



                                                  off).           



                                                  (Same as:           



                                                  Aspercreme           



                                                  Lidocaine           



                                                  Patch)           



                                                  "Remove           



                                                  old patch           



                                                  before           



                                                  applicatio           



                                                  n of new           



                                                  patch"           

 

     zinc            No                       Notes:           [facility]



     sulfate                                     (Zinc           l



               14:00:                               sulfate           [facility]



               00                                 capsule) -           



                                                  220 mg           



                                                  Zinc           



                                                  sulfate =           



                                                  50 mg           



                                                  elemental           



                                                  zinc Same           



                                                  as Zinc           



                                                  Sulfate           

 

     ascorbic            No                       Notes:           [facility]



     acid      -                               (Same as:           l



               14:00:                               Vitamin C)           [facility]



                                                               

 

     celecoxib            No                       Notes:           [facility]

a



               4-09                               NSAID.           l



               14:00:                               Please           [place]



               00                                 check           



                                                  indication           



                                                  . Not for           



                                                  seizure.           



                                                  (Same As:           



                                                  CeleBREX)           

 

     Lidoderm 5%            No                       Notes:           [facility]

[facility]



     topical      -                               Apply only           l



     film      14:00:                               once for           [facility]



     (patch)      00                                 up to 12           



                                                  hours in a           



                                                  24-hour           



                                                  period (12           



                                                  hours on           



                                                  and 12           



                                                  hours           



                                                  off).           



                                                  (Same as:           



                                                  Aspercreme           



                                                  Lidocaine           



                                                  Patch)           



                                                  "Remove           



                                                  old patch           



                                                  before           



                                                  applicatio           



                                                  n of new           



                                                  patch"           

 

     zinc            No                       Notes:           [facility]



     sulfate      -                               (Zinc           l



               14:00:                               sulfate           [facility]



               00                                 capsule) -           



                                                  220 mg           



                                                  Zinc           



                                                  sulfate =           



                                                  50 mg           



                                                  elemental           



                                                  zinc Same           



                                                  as Zinc           



                                                  Sulfate           

 

     cefepime +            No                       Notes:           [facility]

[facility]



     sterile      -                               (Same As:           l



     water 10 mL      12:00:                               Maxipime)           [facility]

[facility]



               00                                 ***            



                                                  MEDICATION           



                                                  WASTE ***           



                                                  Product           



                                                  Size: 1000           



                                                  mg Product           



                                                  Wasted:           



                                                  _0__ mg           

 

     cefepime +            No                       Notes:           [facility]

[facility]



     sterile      4-                               (Same As:           l



     water 10 mL      12:00:                               Maxipime)           [facility]

erm



               00                                 ***            



                                                  MEDICATION           



                                                  WASTE ***           



                                                  Product           



                                                  Size: 1000           



                                                  mg Product           



                                                  Wasted:           



                                                  _0__ mg           

 

     hydromorpho            No                       Notes:           [facility]

[facility]



     ne        4-09                               (Same as:           l



               09:39:                               Dilaudid)           [facility]



               00                                                

 

     hydromorpho            No                       Notes:           [facility]

[facility]



     ne        4-09                               (Same as:           l



               09:39:                               Dilaudid)           [facility]



               00                                                

 

     methocarbam            No                       Notes:           [facility]

[facility]



     ol        4-09                               (Same           l



               05:00:                               as:Robaxin           [place]



               00                                 )              

 

     methocarbam            No                       Notes:           [facility]

[facility]



     ol        4-09                               (Same           l



               05:00:                               as:Robaxin           [place]



               00                                 )              

 

     docusate            No                       Notes:           [facility]



               4-09                               (Same as:           l



               02:00:                               Colace)           [place]



               00                                 (Do Not           



                                                  Crush)           

 

     senna            No                       Notes:           [facility]



               4-09                               (Same as:           l



               02:00:                               Senokot)           [facility]



               00                                                

 

     Saline            No                       Notes:           [facility]



     Flush 0.9%      4-09                               (Same as:           l



               02:00:                               BD             [place]



               00                                 Posiflush)           

 

     topiramate            No                       Notes:           [facility]

[facility]



               4-09                               (Same As:           l



               02:00:                               Topamax)           [facility]



               00                                 "Do Not           



                                                  Crush"           



                                                  Hazardous           



                                                  Drug Group           



                                                  3:Reproduc           



                                                  tive risk           



                                                  Hazardous           



                                                  Drug --           



                                                  Refer to           



                                                  safe           



                                                  handling           



                                                  procedure           



                                                  PPE Matrix           

 

     remove            No                       Notes:           [facility]



     patch      4-09                               Remove           l



               02:00:                               patch 12           [facility]



               00                                 hours           



                                                  after           



                                                  applicatio           



                                                  n each           



                                                  day.           

 

     docusate            No                       Notes:           [facility]



               4-09                               (Same as:           l



               02:00:                               Colace)           [facility]



               00                                 (Do Not           



                                                  Crush)           

 

     senna            No                       Notes:           [facility]



               4-09                               (Same as:           l



               02:00:                               Senokot)           [place]



               00                                                

 

     Saline            No                       Notes:           [facility]



     Flush 0.9%      4-09                               (Same as:           l



               02:00:                               BD             [place]



               00                                 Posiflush)           

 

     topiramate            No                       Notes:           [facility]

[facility]



               4-09                               (Same As:           l



               02:00:                               Topamax)           [place]



               00                                 "Do Not           



                                                  Crush"           



                                                  Hazardous           



                                                  Drug Group           



                                                  3:Reproduc           



                                                  tive risk           



                                                  Hazardous           



                                                  Drug --           



                                                  Refer to           



                                                  safe           



                                                  handling           



                                                  procedure           



                                                  PPE Matrix           

 

     remove            No                       Notes:           [facility]



     patch      4-09                               Remove           l



               02:00:                               patch 12           [facility]



               00                                 hours           



                                                  after           



                                                  applicatio           



                                                  n each           



                                                  day.           

 

     acetaminoph            No                       Notes: [facility]           

[facility]



     en        -09                               acetaminop           l



               01:00:                               hen 4000           [place]



               00                                 mg/day (4           



                                                  gm/day).           



                                                  (Same as:           



                                                  Tylenol           



                                                  Extra           



                                                  Strength)           

 

     acetaminoph            No                       Notes: [facility]           

[facility]



     en        -                               acetaminop           l



               01:00:                               hen 4000           [place]



               00                                 mg/day (4           



                                                  gm/day).           



                                                  (Same as:           



                                                  Tylenol           



                                                  Extra           



                                                  Strength)           

 

     oxyCODONE            No                       Notes:           [facility]

a



     immediate      4-09                               (Same as:           l



     release      00:09:                               Roxicodone           [facility]

[facility]



               00                                 )              

 

     acetaminoph            No                       Notes: Do           [facility]

[facility]



     en-hydrocod                                     not exceed           l



     one 325      00:09:                               4gm/day of           [facility]

jorge



     mg-10 mg      00                                 acetaminop           



     oral tablet                                         hen. (Same           



                                                  as: Norco           



                                                  325/10)           

 

     oxyCODONE            No                       Notes:           [facility]

a



     immediate      -                               (Same as:           l



     release      00:09:                               Roxicodone           [facility]

[facility]



               00                                 )              

 

     acetaminoph            No                       Notes: Do           [facility]

[facility]



     en-hydrocod                                     not exceed           l



     one 325      00:09:                               4gm/day of           [facility]

[facility]



     mg-10 mg      00                                 acetaminop           



     oral tablet                                         hen. (Same           



                                                  as: Norco           



                                                  325/10)           

 

     LORazepam            No                       Notes:           [facility]

a



               4-09                               (Same as:           l



               00:06:                               Ativan)           [place]



               00                                                

 

     oxyCODONE            No                       Notes:           [facility]

a



     immediate      4-                               (Same as:           l



     release      00:06:                               Roxicodone           [facility]

[facility]



               00                                 )              

 

     LORazepam            No                       Notes:           [facility]

a



               4-09                               (Same as:           l



               00:06:                               Ativan)           [facility]



               00                                                

 

     oxyCODONE            No                       Notes:           [facility]

a



     immediate      4-09                               (Same as:           l



     release      00:06:                               Roxicodone           [facility]

[facility]



               00                                 )              

 

     Sodium            No                       1,000 mL,           [facility]

a



     Chloride                                     Rate: 75           l



     0.9% IV      23:43:                               ml/hr,           [facility]



     1,000 mL      00                                 Infuse           



                                                  over: 13.3           



                                                  hr, Route:           



                                                  IV, Dosing           



                                                  Weight           



                                                  127.727           



                                                  kg, Total           



                                                  Volume:           



                                                  1,000,           



                                                  Start           



                                                  date:           



                                                  22           



                                                  18:43:00           



                                                  CDT,           



                                                  Duration:           



                                                  30 day,           



                                                  Stop date:           



                                                  22           



                                                  18:42:00           



                                                  CDT, BSA:           



                                                  2.37 m2, 0           

 

     acetaminoph            No                       Notes: Do           [facility]

[facility]



     en        4-08                               not exceed           l



               23:43:                               4 gm/day.           [place]



               00                                 (Same as:           



                                                  Tylenol)           

 

     acetaminoph            No                       Notes:           [facility]

[facility]



     en-hydrocod      4-08                               (Same as:           l



     one 325      23:43:                               Norco           [place]



     mg-5 mg      00                                 325/5) Do           



     oral tablet                                         not exceed           



                                                  4gm/day of           



                                                  acetaminop           



                                                  hen.           

 

     acetaminoph            No                       Notes: Do           M

[facility]



     en-hydrocod      4-08                               not exceed           l



     one 325      23:43:                               4gm/day of           [facility]

jorge



     mg-10 mg      00                                 acetaminop           



     oral tablet                                         hen. (Same           



                                                  as: Norco           



                                                  325/10)           

 

     bisacodyl            No                       Notes:           [facility]

a



               4-08                               (Same As:           l



               23:43:                               Dulcolax,           [facility]                                 Bisco-Lax)           

 

     hydrALAZINE            No                       Notes:           [facility]

ra[facility]



               4-08                               (Same as:           l



               23:43:                               Apresoline                                            ) Push           



                                                  over 5           



                                                  minutes           

 

     labetalol            No                       10 mg, 2           [facility]

[facility]



               4-08                               mL, Route:           l



               23:43:                               IVP, Drug                                            form: INJ,           



                                                  Q15Min,           



                                                  Dosing           



                                                  Weight           



                                                  127.727,           



                                                  kg, Start           



                                                  date:           



                                                  22           



                                                  18:43:00           



                                                  CDT,           



                                                  Duration:           



                                                  3 doses or           



                                                  times,           



                                                  Stop date:           



                                                  22           



                                                  19:13:00           



                                                  CDT, 0           

 

     Saline            No                       Notes:           [facility]



     Flush 0.9%      4-08                               (Same as:           l



               23:43:                               BD             [place]                                 Posiflush)           

 

     Sodium            No                       1,000 mL,           [facility]

a



     Chloride      4-08                               Rate: 75           l



     0.9% IV      23:43:                               ml/hr,           [facility]



     1,000 mL      00                                 Infuse           



                                                  over: 13.3           



                                                  hr, Route:           



                                                  IV, Dosing           



                                                  Weight           



                                                  127.727           



                                                  kg, Total           



                                                  Volume:           



                                                  1,000,           



                                                  Start           



                                                  date:           



                                                  22           



                                                  18:43:00           



                                                  CDT,           



                                                  Duration:           



                                                  30 day,           



                                                  Stop date:           



                                                  22           



                                                  18:42:00           



                                                  CDT, BSA:           



                                                  2.37 m2, 0           

 

     acetaminoph            No                       Notes: Do           M

[facility]



     en        4-08                               not exceed           l



               23:43:                               4 gm/day.           [place]



                                                (Same as:           



                                                  Tylenol)           

 

     acetaminoph            No                       Notes:           [facility]

[facility]



     en-hydrocod      -08                               (Same as:           l



     one 325      23:43:                               Norco           [place]



     mg-5 mg      00                                 325/5) Do           



     oral tablet                                         not exceed           



                                                  4gm/day of           



                                                  acetaminop           



                                                  hen.           

 

     acetaminoph            No                       Notes: Do           [facility]

[facility]



     en-hydrocod      4-08                               not exceed           l



     one 325      23:43:                               4gm/day of           [facility]

jorge



     mg-10 mg      00                                 acetaminop           



     oral tablet                                         hen. (Same           



                                                  as: Norco           



                                                  325/10)           

 

     bisacodyl            No                       Notes:           [facility]

a



               -08                               (Same As:           l



               23:43:                               Dulcolax,                                            Bisco-Lax)           

 

     hydrALAZINE            No                       Notes:           [facility]

ra[facility]



               4-08                               (Same as:           l



               23:43:                               Apresoline                                            ) Push           



                                                  over 5           



                                                  minutes           

 

     labetalol            No                       10 mg, 2           [facility]

ra[facility]



               4-08                               mL, Route:           l



               23:43:                               IVP, Drug                                            form: INJ,           



                                                  Q15Min,           



                                                  Dosing           



                                                  Weight           



                                                  127.727,           



                                                  kg, Start           



                                                  date:           



                                                  22           



                                                  18:43:00           



                                                  CDT,           



                                                  Duration:           



                                                  3 doses or           



                                                  times,           



                                                  Stop date:           



                                                  22           



                                                  19:13:00           



                                                  CDT, 0           

 

     Saline            No                       Notes:           [facility]



     Flush 0.9%                                     (Same as:           l



               23:43:                               BD                                              Posiflush)           

 

     NaCl 0.9%                   1000mL      at 999           [facility]

vers



     (NS) bolus       04-04                          mL/hr,           ity of



     infusion      05:00: 05:59                          1,000 mL,           Eric

as



     1,000 mL      00   :00                           IV             [facility]



                                                  Piggyback,           Branch



                                                  ONCE, 1           



                                                  dose, On           



                                                  22           



                                                  at 0000,           



                                                  STAT           

 

     diphenhydrA      2022- No             25mg      25 mg,           [facility]i

vers



     MINE      -                          Slow IV           ity of



     (BENADRYL)      05:00: 04:47                          Push,           [place]



     injection      00   :00                           ONCE, 1           Medical



     25 mg                                         dose, On           [facility]



                                                  22           



                                                  at 0000,           



                                                  STAT           

 

     haloperidol      2022- No             2.5mg      2.5 mg,           U

nivers



     lactate                                Intravenou           ity o

f



     (HALDOL)      05:00: 04:47                          s, ONCE, 1           [facility]

[facility]



     injection      00   :00                           dose, On           [facility]

l



     2.5 mg                                         22           [facility]



                                                  at 0000,           



                                                  STAT           

 

     topiramate            Yes                      25 mg = 1           Me

moria



     25 mg oral      3-29                               cap, PO,           l



     capsule      13:44:                               Q12H, # 60           [facility]

jorge



               00                                 cap, 0           



                                                  Refill(s),           



                                                  Pharmacy:           



                                                  [facility] #[id number],           



                                                  149.86,           



                                                  cm,            



                                                  22           



                                                  1:18:00           



                                                  CDT,           



                                                  Height,           



                                                  127.727,           



                                                  kg,            



                                                  22           



                                                  1:18:00           



                                                  CDT,           



                                                  Weight           

 

     topiramate            Yes                      25 mg = 1           Me

moria



     25 mg oral      3-29                               cap, PO,           l



     capsule      13:44:                               Q12H, # 60           [facility]

jorge



               00                                 cap, 0           



                                                  Refill(s),           



                                                  Pharmacy:           



                                                  [facility] #[id number],           



                                                  149.86,           



                                                  cm,            



                                                  22           



                                                  1:18:00           



                                                  CDT,           



                                                  Height,           



                                                  127.727,           



                                                  kg,            



                                                  22           



                                                  1:18:00           



                                                  CDT,           



                                                  Weight           

 

     topiramate            No                       Notes:           [facility]

ia



               3-28                               Hazardous           l



               22:05:                               Drug Group           [place]                                 3:Reproduc           



                                                  tive risk           



                                                  Hazardous           



                                                  Drug --           



                                                  Refer to           



                                                  safe           



                                                  handling           



                                                  procedure           



                                                  PPE Matrix           



                                                  Sprinkle           



                                                  formulatio           



                                                  n. (Same           



                                                  As:            



                                                  Topamax)           

 

     topiramate            No                       Notes:           [facility]

ia



               3-28                               Hazardous           l



               22:05:                               Drug Group           [facility]                                 3:Reproduc           



                                                  tive risk           



                                                  Hazardous           



                                                  Drug --           



                                                  Refer to           



                                                  safe           



                                                  handling           



                                                  procedure           



                                                  PPE Matrix           



                                                  Sprinkle           



                                                  formulatio           



                                                  n. (Same           



                                                  As:            



                                                  Topamax)           

 

     Sonia            No                       Notes:           [facility]



     packet      3-28                               (Same as:           l



               21:30:                               Sonia           [place]                                 Unflavored           



                                                  )              



                                                  Administer           



                                                  ing SONIA           



                                                  orally:           



                                                  Mix into           



                                                  8-10 fl oz           



                                                  of room           



                                                  temperatur           



                                                  e juice,           



                                                  soda, or           



                                                  water.           



                                                  Also mixes           



                                                  well with           



                                                  warm           



                                                  beverages,           



                                                  like           



                                                  coffee or           



                                                  tea. Can           



                                                  be mixed           



                                                  with           



                                                  applesauce           



                                                  .              



                                                  Thoroughly           



                                                  stir for           



                                                  30-60           



                                                  seconds or           



                                                  until           



                                                  completely           



                                                  dissolved           

 

     Sonia            No                       Notes:           [facility]



     packet      3-28                               (Same as:           l



               21:30:                               [facility]                                            Unflavored           



                                                  )              



                                                  Administer           



                                                  ing SONIA           



                                                  orally:           



                                                  Mix into           



                                                  8-10 fl oz           



                                                  of room           



                                                  temperatur           



                                                  e juice,           



                                                  soda, or           



                                                  water.           



                                                  Also mixes           



                                                  well with           



                                                  warm           



                                                  beverages,           



                                                  like           



                                                  coffee or           



                                                  tea. Can           



                                                  be mixed           



                                                  with           



                                                  applesauce           



                                                  .              



                                                  Thoroughly           



                                                  stir for           



                                                  30-60           



                                                  seconds or           



                                                  until           



                                                  completely           



                                                  dissolved           

 

     Lovenox            No                       Notes:           [facility]



               3-27                               (Same as:           l



               16:00:                               Lovenox)                                                           

 

     Lovenox            No                       Notes:           [facility]



               3-27                               (Same as:           l



               16:00:                               Lovenox)                                                           

 

     Magnesium            No                       Notes:           [facility]

a



     Sulfate      3-27                               WASTE: F/P           l



               15:54:                               - Sink; E                                            -              



                                                  Municipal           



                                                  Trash Bin           

 

     methylPREDN            No                       Notes:           [facility]

[facility]



     ISolone      3-27                               (Same           l



     SODium      15:54:                               as:Solu-ME           [facility]

nn



     SUCCinate      00                                 DROL,           



                                                  A-Methapre           



                                                  d) ***           



                                                  MEDICATION           



                                                  WASTE ***           



                                                  Product           



                                                  Size: 1000           



                                                  mg Product           



                                                  Wasted:           



                                                  ___ mg           

 

     Magnesium            No                       Notes:           [facility]

a



     Sulfate      3-27                               WASTE: F/P           l



               15:54:                               - Sink; E                                            -              



                                                  Municipal           



                                                  Trash Bin           

 

     methylPREDN            No                       Notes:           [facility]

[facility]



     ISolone      3-27                               (Same           l



     SODium      15:54:                               as:Solu-ME           [facility]

nn



     SUCCinate      00                                 DROL,           



                                                  A-Methapre           



                                                  d) ***           



                                                  MEDICATION           



                                                  WASTE ***           



                                                  Product           



                                                  Size: 1000           



                                                  mg Product           



                                                  Wasted:           



                                                  ___ mg           

 

     Dilaudid            No                       Notes:           [facility]



               3-27                               (Same as:           l



               13:28:                               Dilaudid)                                                           

 

     Dilaudid            No                       Notes:           [facility]



               3-27                               (Same as:           l



               13:28:                               Dilaudid)                                                           

 

     remove            No                       Notes:           [facility]



     patch      3-27                               Remove           l



               02:00:                               patch 12           [place]



               00                                 hours           



                                                  after           



                                                  applicatio           



                                                  n each           



                                                  day.           

 

     remove            No                       Notes:           [facility]



     patch      3-27                               Remove           l



               02:00:                               patch 12           [place]



               00                                 hours           



                                                  after           



                                                  applicatio           



                                                  n each           



                                                  day.           

 

     Lyrica            No                       Notes:           [facility]



               3-26                               (Same as:           l



               21:58:                               Lyrica)           [place]



               00                                                

 

     Naproxen            No                       Notes:           [facility]



               3-26                               (Same as:           l



               21:58:                               Naprosyn)           [facility]



               00                                 Take with           



                                                  food.           

 

     Lyrica            No                       Notes:           [facility]



               3-26                               (Same as:           l



               21:58:                               Lyrica)           [place]



               00                                                

 

     Naproxen            No                       Notes:           [facility]



               3-26                               (Same as:           l



               21:58:                               Naprosyn)           [place]



               00                                 Take with           



                                                  food.           

 

     Ascorbic            No                       Notes:           [facility]



     Acid      3-26                               (Same as:           l



               14:00:                               Vitamin C)           [facility]



               00                                                

 

     Zinc            No                       Notes:           [facility]



     Sulfate      3-26                               (Zinc           l



               14:00:                               sulfate           [facility]



               00                                 capsule) -           



                                                  220 mg           



                                                  Zinc           



                                                  sulfate =           



                                                  50 mg           



                                                  elemental           



                                                  zinc Same           



                                                  as Zinc           



                                                  Sulfate           

 

     multivitami            No                       Notes:           [facility]

[facility]



     n         3-26                               (Same           l



               14:00:                               as:Thera)           [place]



               00                                 WASTE: F/P           



                                                  - Black; E           



                                                  -              



                                                  Municipal           



                                                  Trash Bin           



                                                  Take with           



                                                  food.           

 

     Lidocaine            No                       Notes:           [facility]

a



     0.05 MG/MG      3-26                               Apply only           l



     Transdermal      14:00:                               once for           [facility]

[facility]



     Patch      00                                 up to 12           



                                                  hours in a           



                                                  24-hour           



                                                  period (12           



                                                  hours on           



                                                  and 12           



                                                  hours           



                                                  off).           



                                                  (Same as:           



                                                  Aspercreme           



                                                  Lidocaine           



                                                  Patch)           



                                                  "Remove           



                                                  old patch           



                                                  before           



                                                  applicatio           



                                                  n of new           



                                                  patch"           

 

     Ascorbic            No                       Notes:           [facility]



     Acid      3-26                               (Same as:           l



               14:00:                               Vitamin C)           [place]



               00                                                

 

     Zinc            No                       Notes:           [facility]



     Sulfate      3-26                               (Zinc           l



               14:00:                               sulfate           [place]



               00                                 capsule) -           



                                                  220 mg           



                                                  Zinc           



                                                  sulfate =           



                                                  50 mg           



                                                  elemental           



                                                  zinc Same           



                                                  as Zinc           



                                                  Sulfate           

 

     multivitami            No                       Notes:           [facility]

[facility]



     n         3-26                               (Same           l



               14:00:                               as:Thera)           [place]



               00                                 WASTE: F/P           



                                                  - Black; E           



                                                  -              



                                                  Municipal           



                                                  Trash Bin           



                                                  Take with           



                                                  food.           

 

     Lidocaine            No                       Notes:           [facility]

a



     0.05 MG/MG      3-26                               Apply only           l



     Transdermal      14:00:                               once for           [facility]

[facility]



     Patch      00                                 up to 12           



                                                  hours in a           



                                                  24-hour           



                                                  period (12           



                                                  hours on           



                                                  and 12           



                                                  hours           



                                                  off).           



                                                  (Same as:           



                                                  Aspercreme           



                                                  Lidocaine           



                                                  Patch)           



                                                  "Remove           



                                                  old patch           



                                                  before           



                                                  applicatio           



                                                  n of new           



                                                  patch"           

 

     Celebrex            No                       Notes:           [facility]



               3-26                               NSAID.           l



               03:15:                               Please           [facility]



               00                                 check           



                                                  indication           



                                                  . Not for           



                                                  seizure.           



                                                  (Same As:           



                                                  CeleBREX)           

 

     Robaxin            No                       Notes:           [facility]



               3-26                               (Same           l



               03:15:                               as:Robaxin           [place]



               00                                 )              

 

     gabapentin            No                       Notes:           [facility]

ia



               3-                               (Same as:           l



               03:15:                               Neurontin)           [place]



               00                                                

 

     Tramadol            No                       Notes: Not           [facility]

[facility]



               3-26                               to exceed           l



               03:15:                               400mg/day.           [facility]



               00                                 (Same As:           



                                                  Ultram)           

 

     Celebrex            No                       Notes:           [facility]



               3-                               NSAID.           l



               03:15:                               Please           [place]



               00                                 check           



                                                  indication           



                                                  . Not for           



                                                  seizure.           



                                                  (Same As:           



                                                  CeleBREX)           

 

     Robaxin            No                       Notes:           [facility]



               3-26                               (Same           l



               03:15:                               as:Robaxin           [facility]



               00                                 )              

 

     gabapentin            No                       Notes:           [facility]

[facility]



               3-                               (Same as:           l



               03:15:                               Neurontin)           [facility]



               00                                                

 

     Tramadol            No                       Notes: Not           [facility]

[facility]



               3-26                               to exceed           l



               03:15:                               400mg/day.           [facility]



               00                                 (Same As:           



                                                  Ultram)           

 

     Dexamethaso            No                       Notes: ***           

[facility]



     ne        3-26                               MEDICATION           l



               03:00:                               WASTE ***           [facility]



               00                                 Product           



                                                  Size: 10           



                                                  mg Product           



                                                  Wasted:           



                                                  ___ mg           

 

     Dexamethaso            No                       Notes: ***           

[facility]



     ne        3-26                               MEDICATION           l



               03:00:                               WASTE ***           [place]



               00                                 Product           



                                                  Size: 10           



                                                  mg Product           



                                                  Wasted:           



                                                  ___ mg           

 

     Dilaudid            No                       Notes:           [facility]



               3-26                               Same as           l



               02:58:                               Dilaudid           [facility]



               00                                                

 

     Dilaudid            No                       Notes:           [facility]



               3-26                               Same as           l



               02:58:                               Dilaudid           [place]



               00                                                

 

     Acetaminoph            No                       Notes: Max           

[facility]



     en        3-25                               acetaminop           l



               22:38:                               hen =           [place]



               00                                 4000mg/day           



                                                  (4             



                                                  gm/day).           



                                                  (Same as:           



                                                  Tylenol)           

 

     Acetaminoph            No                       Notes: Max           

[facility]



     en        3-25                               acetaminop           l



               22:38:                               hen =           [facility]



               00                                 4000mg/day           



                                                  (4             



                                                  gm/day).           



                                                  (Same as:           



                                                  Tylenol)           

 

     Isolyte S            No                       Notes:           [facility]

a



     PH-7.4      3-25                               (Same as:           l



     (Bolus) IV      22:37:                               Isolyte S           [facility]

[facility]



               00                                 PH7.4,           



                                                  Normosol-R           



                                                  PH 7.4,           



                                                  Plasma-Lyt           



                                                  e A )           

 

     Magnesium            No                       Notes:           [facility]

a



     Sulfate      3-25                               WASTE: F/P           l



               22:37:                               - Sink; E           [facility]



                                                -              



                                                  Municipal           



                                                  Trash Bin           

 

     Isolyte S            No                       Notes:           [facility]

a



     PH-7.4      3-25                               (Same as:           l



     (Bolus) IV      22:37:                               Isolyte S           [facility]

[facility]



               00                                 PH7.4,           



                                                  Normosol-R           



                                                  PH 7.4,           



                                                  Plasma-Lyt           



                                                  e A )           

 

     Magnesium            No                       Notes:           [facility]

a



     Sulfate      3-25                               WASTE: F/P           l



               22:37:                               - Sink; E           [facility]



                                                -              



                                                  Municipal           



                                                  Trash Bin           

 

     Iohexol            No                       100 mL,           [facility]



               3-25                               Route:           l



               20:04:                               IVP, Drug           [place]



               00                                 Form:           



                                                  SOLN,           



                                                  Dosing           



                                                  Weight           



                                                  127.727,           



                                                  kg,            



                                                  ONCALL,           



                                                  STAT,           



                                                  Start           



                                                  date:           



                                                  22           



                                                  15:04:00           



                                                  CDT,           



                                                  Duration:           



                                                  1 doses or           



                                                  times,           



                                                  Dose =           



                                                  2.2ml/kg,           



                                                  Max dose =           



                                                  100ml --           



                                                  "To be           



                                                  infused by           



                                                  Radiology           



                                                  Staff           



                                                  ONLY"           

 

     Iohexol            No                       100 mL,           [facility]



               3-25                               Route:           l



               20:04:                               IVP, Drug           [facility]



                                                Form:           



                                                  SOLN,           



                                                  Dosing           



                                                  Weight           



                                                  127.727,           



                                                  kg,            



                                                  ONCALL,           



                                                  STAT,           



                                                  Start           



                                                  date:           



                                                  22           



                                                  15:04:00           



                                                  CDT,           



                                                  Duration:           



                                                  1 doses or           



                                                  times,           



                                                  Dose =           



                                                  2.2ml/kg,           



                                                  Max dose =           



                                                  100ml --           



                                                  "To be           



                                                  infused by           



                                                  Radiology           



                                                  Staff           



                                                  ONLY"           

 

     Docusate            No                       Notes:           [facility]



               3-25                               (Same as:           l



               14:00:                               Colace)                                            (Do Not           



                                                  Crush)           

 

     sennosides,            No                       Notes:           [facility]

raje[facility]



     USP       3-25                               (Same as:           l



               14:00:                               Senokot)                                                           

 

     Saline            No                       Notes:           [facility]



     Flush 0.9%      3-25                               preservati           l



               14:00:                               ve free.                                                           

 

     Docusate            No                       Notes:           [facility]



               3-25                               (Same as:           l



               14:00:                               Colace)                                            (Do Not           



                                                  Crush)           

 

     sennosides,            No                       Notes:           [facility]

[facility]



     USP       3-25                               (Same as:           l



               14:00:                               Senokot)                                                           

 

     Saline            No                       Notes:           [facility]



     Flush 0.9%      3-25                               preservati           l



               14:00:                               ve free.                                                           

 

     influenza            Yes                      Notes:           [facility]

a



     virus      3-25                               (Same as:           l



     vaccine,      06:10:                               Fluzone           [facility]

n



     inactivated      26                                 [place]           



                                                  nt,            



                                                  Fluarix           



                                                  [place]           



                                                  nt) For           



                                                  patients 6           



                                                  - 35           



                                                  months of           



                                                  age (0.5           



                                                  mL IM) For           



                                                  3 years of           



                                                  age and           



                                                  older (0.5           



                                                  mL IM)           



                                                  Shake well           



                                                  before use           

 

     influenza            Yes                      Notes:           [facility]

a



     virus      3-25                               (Same as:           l



     vaccine,      06:10:                               Fluzone           [facility]

n



     inactivated      26                                 [place]           



                                                  nt,            



                                                  Fluarix           



                                                  [place]           



                                                  nt) For           



                                                  patients 6           



                                                  - 35           



                                                  months of           



                                                  age (0.5           



                                                  mL IM) For           



                                                  3 years of           



                                                  age and           



                                                  older (0.5           



                                                  mL IM)           



                                                  Shake well           



                                                  before use           

 

     Lorazepam            No                       Notes:           [facility]

a



               3-25                               (Same as:           l



               06:05:                               Ativan)                                                           

 

     Methocarbam            No                       Notes:           [facility]

[facility]



     ol        3-25                               (Same           l



               06:05:                               as:Robaxin           [place]                                 )              

 

     Lorazepam            No                       Notes:           [facility]

a



               3-25                               (Same as:           l



               06:05:                               Ativan)                                                           

 

     Methocarbam            No                       Notes:           [facility]

[facility]



     ol        3-25                               (Same           l



               06:05:                               as:Robaxin           [place]                                 )              

 

     Sodium            No                       1,000 mL,           [facility]

a



     Chloride      3-25                               Rate: 50           l



     0.9% IV      06:03:                               ml/hr,           [facility]



     1,000 mL      00                                 Infuse           



                                                  over: 20           



                                                  hr, Route:           



                                                  IV, Dosing           



                                                  Weight           



                                                  131.818           



                                                  kg, Total           



                                                  Volume:           



                                                  1,000,           



                                                  Start           



                                                  date:           



                                                  22           



                                                  1:03:00           



                                                  CDT,           



                                                  Duration:           



                                                  30 day,           



                                                  Stop date:           



                                                  22           



                                                  1:02:00           



                                                  CDT, BSA:           



                                                  2.4 m2, 0           

 

     Labetalol            No                       10 mg, 2           [facility]

[facility]



               3-25                               mL, Route:           l



               06:03:                               IVP, Drug                                            form: INJ,           



                                                  Q15Min,           



                                                  Dosing           



                                                  Weight           



                                                  131.818,           



                                                  kg, Start           



                                                  date:           



                                                  22           



                                                  1:03:00           



                                                  CDT,           



                                                  Duration:           



                                                  3 doses or           



                                                  times,           



                                                  Stop date:           



                                                  22           



                                                  1:33:00           



                                                  CDT, 0           

 

     Hydralazine            No                       Notes:           [facility]

[facility]



               3-25                               (Same as:           l



               06:03:                               Apresoline                                            ) Push           



                                                  over 5           



                                                  minutes           

 

     Ondansetron            No                       /= 4           [facility]

a



               3-25                               years,           l



               06:03:                               Pediatric                                            Dosing           

 

     Acetaminoph            No                       Notes: Do           [facility]

[facility]



     en 325 MG /      3-25                               not exceed           l



     Hydrocodone      06:03:                               4gm/day of           

[facility]



     Bitartrate      00                                 acetaminop           



     10 MG Oral                                         hen. (Same           



     Tablet                                         as: Norco           



     [Norco                                         325/10)           



     10/325]                                                        

 

     Dilaudid            No                       Notes:           [facility]



               3-25                               Same as           l



               06:03:                               Dilaudid                                                           

 

     Benadryl            No                       Notes:           [facility]



               3-25                               (Same as:           l



               06:03:                               Benadryl)           [facility]



               00                                                

 

     phenol            No                       Notes:           [facility]



               3-25                               Chlorasept           l



               06:03:                               ic Spray                                            (Same as:           



                                                  Chlorasept           



                                                  ic, Sore           



                                                  Throat           



                                                  Spray)           



                                                  WASTE: F/P           



                                                  - Black; E           



                                                  -              



                                                  Municipal           



                                                  Trash Bin           

 

     Bisacodyl            No                       Notes:           [facility]

a



               3-25                               (Same As:           l



               06:03:                               Dulcolax,           [place]



               00                                 Bisco-Lax)           

 

     Robaxin            No                       Notes:           [facility]



               3-25                               (Same           l



               06:03:                               as:Robaxin                                            )              

 

     Melatonin 3            No                       Notes:           [facility]

[facility]



     MG Extended      3-25                               (Same as:           l



     Release      06:03:                               Melatonin)           [facility]

[facility]



     Tablet      00                                                

 

     Tylenol            No                       Notes: Do           [facility]

ia



               3-25                               not exceed           l



               06:03:                               4 gm/day.           [facility]



               00                                 (Same as:           



                                                  Tylenol)           

 

     Reglan            No                       Notes:           [facility]



               3-25                               (Same as:           l



               06:03:                               Reglan)           [facility]



                                                               

 

     Phenergan            No                       Notes: Do           [facility]

[facility]



               3-25                               not give           l



               06:03:                               IV push.           [facility]



                                                (Same as:           



                                                  Phenergan)           

 

     Saline            No                       Notes:           [facility]



     Flush 0.9%      3-25                               preservati           l



               06:03:                               ve free.           [facility]



               00                                                

 

     Sodium            No                       1,000 mL,           [facility]

a



     Chloride      3-25                               Rate: 50           l



     0.9% IV      06:03:                               ml/hr,           [facility]



     1,000 mL      00                                 Infuse           



                                                  over: 20           



                                                  hr, Route:           



                                                  IV, Dosing           



                                                  Weight           



                                                  131.818           



                                                  kg, Total           



                                                  Volume:           



                                                  1,000,           



                                                  Start           



                                                  date:           



                                                  22           



                                                  1:03:00           



                                                  CDT,           



                                                  Duration:           



                                                  30 day,           



                                                  Stop date:           



                                                  22           



                                                  1:02:00           



                                                  CDT, BSA:           



                                                  2.4 m2, 0           

 

     Labetalol            No                       10 mg, 2           [facility]

[facility]



               3-25                               mL, Route:           l



               06:03:                               IVP, Drug                                            form: INJ,           



                                                  Q15Min,           



                                                  Dosing           



                                                  Weight           



                                                  131.818,           



                                                  kg, Start           



                                                  date:           



                                                  22           



                                                  1:03:00           



                                                  CDT,           



                                                  Duration:           



                                                  3 doses or           



                                                  times,           



                                                  Stop date:           



                                                  22           



                                                  1:33:00           



                                                  CDT, 0           

 

     Hydralazine            No                       Notes:           [facility]

[facility]



               3-25                               (Same as:           l



               06:03:                               Apresoline                                            ) Push           



                                                  over 5           



                                                  minutes           

 

     Ondansetron            No                       /= 4           [facility]

a



               3-25                               years,           l



               06:03:                               Pediatric           [facility]



               00                                 Dosing           

 

     Acetaminoph            No                       Notes: Do           [facility]

[facility]



     en 325 MG /      3-25                               not exceed           l



     Hydrocodone      06:03:                               4gm/day of           

[facility]



     Bitartrate      00                                 acetaminop           



     10 MG Oral                                         hen. (Same           



     Tablet                                         as: Norco           



     [Norco                                         325/10)           



     10/325]                                                        

 

     Dilaudid            No                       Notes:           [facility]



               3-25                               Same as           l



               06:03:                               Dilaudid                                                           

 

     Benadryl            No                       Notes:           [facility]



               3-25                               (Same as:           l



               06:03:                               Benadryl)           [place]



                                                               

 

     phenol            No                       Notes:           [facility]



               3-25                               Chlorasept           l



               06:03:                               ic Spray                                            (Same as:           



                                                  Chlorasept           



                                                  ic, Sore           



                                                  Throat           



                                                  Spray)           



                                                  WASTE: F/P           



                                                  - Black; E           



                                                  -              



                                                  Municipal           



                                                  Trash Bin           

 

     Bisacodyl            No                       Notes:           [facility]

a



               3-25                               (Same As:           l



               06:03:                               Dulcolax,                                            Bisco-Lax)           

 

     Robaxin            No                       Notes:           [facility]



               3-25                               (Same           l



               06:03:                               as:Robaxin                                            )              

 

     Melatonin 3            No                       Notes:           [facility]

[facility]



     MG Extended      3-25                               (Same as:           l



     Release      06:03:                               Melatonin)           

[facility]



     Tablet      00                                                

 

     Tylenol            No                       Notes: Do           [facility]

ia



               3-25                               not exceed           l



               06:03:                               4 gm/day.                                            (Same as:           



                                                  Tylenol)           

 

     Reglan            No                       Notes:           [facility]



               3-25                               (Same as:           l



               06:03:                               Reglan)                                                           

 

     Phenergan            No                       Notes: Do           [facility]

[facility]



               3-25                               not give           l



               06:03:                               IV push.                                            (Same as:           



                                                  Phenergan)           

 

     Saline            No                       Notes:           [facility]



     Flush 0.9%      3-25                               preservati           l



               06:03:                               ve free.                                                           

 

     butalbital-      2022- No             2{tbl}      2 tablet,         

  [facility]



     acetaminoph      3-18 03-18                          Oral,           [facility] of



     en-caff      03:45: 03:10                          ONCE, 1           [place]



     (ESGIC)      00   :00                           dose, On           [facility]



     -40                                         Thu            [facility]



     mg tablet 2                                         3/17/22 at           



     tablet                                         2245, ASAP           

 

     NaCl 0.9%      2022- No             500mL      at 999           [facility]

[facility]



     (NS) bolus      3-18 03-18                          mL/hr, 500           it

y of



     infusion      02:30: 03:17                          mL, IV           [place]



     500 mL      00   :00                           Infusion,           [facility]



                                                  ONCE, 1           [facility]



                                                  dose, On           



                                                  Thu            



                                                  3/17/22 at           



                                                  2130, STAT           

 

     diphenhydrA      2022- No             25mg      25 mg,           [facility]

vers



     MINE      3-18 03-18                          Slow IV           [facility] of



     (BENADRYL)      02:30: 01:46                          Push,           [place]



     injection      00   :00                           ONCE, 1           [facility]



     25 mg                                         dose, On           [facility]



                                                  Thu            



                                                  3/17/22 at           



                                                  2130, STAT           

 

     magnesium      2022- No             2g        2 g, IV           [facility]

ers



     sulfate in      3-18 03-18                          Piggyback,           it

y of



     water 2      02:15: 03:17                          Administer           [facility]

as



     gram/50 mL      00   :00                           over 30           [facility]

l



     (4 %)                                         Minutes,           [facility]



     infusion 2                                         ONCE, 1           



     g                                            dose, On           



                                                  u            



                                                  3/17/22 at           



                                                  2115,           



                                                  Routine           

 

     HYDROmorpho      2022- No             .5mg      0.5 mg,           Un

[facility]



     ne                                  Slow IV           ity of



     (DILAUDID)      01:30: 01:25                          Push,           [place]



     injection      00   :00                           ONCE, 1           Medical



     0.5 mg                                         dose, On           [facility]



                                                  Tue            



                                                  22 at           



                                                  1930,           



                                                  Routine<br           



                                                  >Use           



                                                  approved           



                                                  by             



                                                  (Faculty):           



                                                  ADC            



                                                  PROVIDER           

 

     levoFLOXaci      2022- No        [id number] 750mg      Take 1        

   [facility]



     n 750 mg                                tablet by           ity o

f



     tablet      00:00: 05:59                          mouth           [place]



               00   :00                           daily for           Medical



                                                  4 days.           [facility]anch

 

     levoFLOXaci      2022- No        [id number] 750mg      Take 1        

   [facility]



     n 750 mg                                tablet by           ity o

f



     tablet      00:00: 05:59                          mouth           [place]



               00   :00                           daily for           Medical



                                                  4 days.           Branch

 

     OXYCODONE            Yes                      Take by           Unive

rs



     HCL/ACETAMI      1-25                               mouth.           ity of



     NOPHEN      19:49:                                              [place]



     (PERCOCET      27                                                Medical



     ORAL)                                                        Branch

 

     OXYCODONE      0      Yes                      Take by           Unive

rs



     HCL/ACETAMI      1-25                               mouth.           ity of



     NOPHEN      19:49:                                              [place]



     (PERCOCET      27                                                Medical



     ORAL)                                                        Branch

 

     OXYCODONE            Yes                      Take by           Unive

rs



     HCL/ACETAMI      1-25                               mouth.           ity of



     NOPHEN      19:49:                                              [place]



     (PERCOCET      27                                                Medical



     ORAL)                                                        [place]

 

     OXYCODONE            Yes                      Take by           Univ

rs



     HCL/ACETAMI      25                               mouth.           ity of



     NOPHEN      19:49:                                              [place]



     (PERCOCET      27                                                Medical



     ORAL)                                                        [place]

 

     vancomycin      2022- No             125mg      125 mg,           Un

[facility]



     (VANCOCIN)                                Oral, QID,           it

y of



     capsule 125      18:00: 21:59                          25 doses,           

[place]



     mg        00   :00                           First dose           Medical



                                                  (after           [facility]



                                                  last           



                                                  modificati           



                                                  on) on 22 at           



                                                  1200, Last           



                                                  dose on           



                                                  22 at           



                                                  1200,           



                                                  ASAP<br>Fa           



                                                  culty           



                                                  member           



                                                  approving           



                                                  Non-formul           



                                                  maryanne            



                                                  medication           



                                                  :              



                                                  MEGADC<br&           



                                                  gt;Reason           



                                                  for            



                                                  non-formul           



                                                  maryanne use:           



                                                  SPECIFIC           



                                                  INDICATION           



                                                  FOR            



                                                  NONFORMULA           



                                                  RY             



                                                  PRODUCT<br           



                                                  >Reason           



                                                  for            



                                                  Anti-Infec           



                                                  tive:           



                                                  Documented           



                                                  Infection<           



                                                  br>Documen           



                                                  huma            



                                                  Infection           



                                                  Site:           



                                                  Abdominal<           



                                                  br>Duratio           



                                                  n of           



                                                  Therapy:           



                                                  14 days           

 

     levoFLOXaci            Yes            750mg      750 mg,           Un

[facility]



     n         25                               Oral,           ity of



     (LEVAQUIN)      15:00:                               DAILY,           [place]



     tablet 750      00                                 First dose           Med

ical



     mg                                           (after           [facility]



                                                  last           



                                                  modificati           



                                                  on) on 22 at           



                                                  0900,           



                                                  Until           



                                                  Discontinu           



                                                  ed,            



                                                  ASAP<br>Re           



                                                  ason for           



                                                  Anti-Infec           



                                                  tive:           



                                                  Empiric           



                                                  Therapy           



                                                  for            



                                                  Suspected           



                                                  Infection<           



                                                  br>Empiric           



                                                  Therapy           



                                                  Site:           



                                                  Urine<br>D           



                                                  uration of           



                                                  therapy:           



                                                  72 hours           

 

     lactobacill      2022- No        [id number] .5mg      Take 1         

  [facility]



     us        -25                          tablet by           ity of



     acidophilus      00:00: 05:59                          mouth 2           [place]

[place]



               00   :00                           (two)           Medical



                                                  times           [facility]



                                                  daily for           



                                                  30 days.           

 

     lactobacill      2022- No        [id number] .5mg      Take 1         

  [facility]



     us         02-25                          tablet by           ity of



     acidophilus      00:00: 05:59                          mouth 2           [place]

[place]



               00   :00                           (two)           Medical



                                                  times           [facility]



                                                  daily for           



                                                  30 days.           

 

     vancomycin      2022- No        [id number] 125mg      Take 1         

  [facility]



     125 mg                                capsule by           ity of



     capsule      00:00: 05:59                          mouth 4           [place]



               00   :00                           (four)           Medical



                                                  times           [facility]



                                                  daily for           



                                                  5 days.           

 

     vancomycin      2022- No        [id number] 125mg      Take 1         

  [facility]



     125 mg                                capsule by           ity of



     capsule      00:00: 05:59                          mouth 4           [place]



               00   :00                           (four)           Medical



                                                  times           [facility]



                                                  daily for           



                                                  5 days.           

 

     traMADoL            Yes            50mg      50 mg,           [facility]



     (ULTRAM)                                     Oral,           ity of



     tablet 50      21:26:                               Q6HPRN,           [place]



     mg        10                                 Starting           Medical



                                                  on Mon           [facility]



                                                  22 at           



                                                  1526,           



                                                  Until           



                                                  Discontinu           



                                                  ed,            



                                                  Routine,           



                                                  Pain           



                                                  (scale           



                                                  4-6)           

 

     levoFLOXaci      2022- No             250mg      250 mg,           U

[facility]



     n                                   Oral, Q24H           ity of



     (LEVAQUIN)      19:30: 23:18                          ABX, First           

[place]



     tablet 250      00   :17                           dose           Medical



     mg                                           (after           [facility]



                                                  last           



                                                  modificati           



                                                  on) on 22 at           



                                                  1330,           



                                                  Until           



                                                  Discontinu           



                                                  ed,            



                                                  ASAP<br>Re           



                                                  ason for           



                                                  Anti-Infec           



                                                  tive:           



                                                  Empiric           



                                                  Therapy           



                                                  for            



                                                  Suspected           



                                                  Infection<           



                                                  br>Empiric           



                                                  Therapy           



                                                  Site:           



                                                  Urine<br>D           



                                                  uration of           



                                                  therapy:           



                                                  72 hours           

 

     HYDROmorpho      2022- No             .5mg      0.5 mg,           Un

[facility]



     ne                                  Slow IV           ity of



     (DILAUDID)      20:45: 18:23                          Push,           [place]



     injection      00   :44                           Q6HPRN,           Medical



     0.5 mg                                         Starting           [facility]



                                                  on Sun           



                                                  22 at           



                                                  1445,           



                                                  Until 22 at           



                                                  1223,           



                                                  Routine,           



                                                  Pain           



                                                  (scale           



                                                  7-10),           



                                                  breakthrou           



                                                  gh             



                                                  pain<br>Us           



                                                  e approved           



                                                  by             



                                                  (Faculty):           



                                                  ADC            



                                                  PROVIDER           

 

     oxyCODONE-a            Yes            2{tbl}      2 tablet,          

 [facility]



     cetaminophe                                     Oral,           ity of



     n         20:39:                               Q6HPRN,           [place]



     (PERCOCET)      03                                 Starting           Medic

al



     5-325 mg                                         on Sun           [facility]



     per tablet                                         22 at           



     2 tablet                                         1439,           



                                                  Until           



                                                  Discontinu           



                                                  ed,            



                                                  Routine,           



                                                  Pain           



                                                  (scale           



                                                  7-10)           

 

     HYDROmorpho      2022- No             .5mg      0.5 mg,           Un

[place]



     ne                                  Slow IV           ity of



     (DILAUDID)      00:00: 23:41                          Push,           [place]



     injection      00   :00                           ONCE, 1           Medical



     0.5 mg                                         dose, On           [facility]



                                                  Sat            



                                                  22 at           



                                                  1800,           



                                                  Routine<br           



                                                  >Use           



                                                  approved           



                                                  by             



                                                  (Faculty):           



                                                  ADC            



                                                  PROVIDER           

 

     ertapenem      2022- No             1000mg      1,000 mg,           

[facility]



     (INVANZ)                                IV             ity of



     1,000 mg in      15:45: 18:30                          Piggyback,          

 [place]



     NaCl 0.9%      00   :00                           Q24H ABX,           Medic

al



     (NS) 50 mL                                         First dose           [facility]

[facility]



     MINI-BAG                                         on Sat           



                                                  22 at           



                                                  0945,           



                                                  Until           



                                                  Discontinu           



                                                  ed,            



                                                  Administer           



                                                  over 30           



                                                  Minutes,           



                                                  50             



                                                  mL<br>Reas           



                                                  on for           



                                                  Anti-Infec           



                                                  tive:           



                                                  Empiric           



                                                  Therapy           



                                                  for            



                                                  Suspected           



                                                  Infection<           



                                                  br>Empiric           



                                                  Therapy           



                                                  Site:           



                                                  Urine<br>D           



                                                  uration of           



                                                  therapy:           



                                                  72             



                                                  hours<br>R           



                                                  estricted           



                                                  use            



                                                  approved           



                                                  by: ADC           



                                                  PROVIDER           

 

     lactobacill            Yes            .5mg      0.5 mg,           Uni

vers



     us                                       Oral, BID,           ity of



     acidophilus      02:00:                               First dose           

[place]



     tablet 0.5      00                                 on Fri           Medical



     mg                                           22 at           [facility]



                                                  2000,           



                                                  Until           



                                                  Discontinu           



                                                  ed,            



                                                  Routine           

 

     vancomycin      2022- No             250mg      250 mg,           Un

[facility]



     (VANCOCIN)                                Oral, QID,           it

y of



     capsule 250      02:00: 16:20                          First dose          

 [place]



     mg        00   :40                           (after           Medical



                                                  last           Br[facility]



                                                  reorder)           



                                                  on Fri           



                                                  22 at           



                                                  2000,           



                                                  Until           



                                                  Discontinu           



                                                  ed,            



                                                  ASAP&lt;br           



                                                  >Faculty           



                                                  member           



                                                  approving           



                                                  Non-formul           



                                                  maryanne            



                                                  medication           



                                                  :              



                                                  MEGADC<br>           



                                                  Reason for           



                                                  non-formul           



                                                  maryanne use:           



                                                  SPECIFIC           



                                                  INDICATION           



                                                  FOR            



                                                  NONFORMULA           



                                                  RY             



                                                  PRODUCT<br           



                                                  >Reason           



                                                  for            



                                                  Anti-Infec           



                                                  tive:           



                                                  Documented           



                                                  Infection<           



                                                  br>Documen           



                                                  huma            



                                                  Infection           



                                                  Site:           



                                                  Abdominal<           



                                                  br>Duratio           



                                                  n of           



                                                  Therapy:           



                                                  14 days           

 

     vancomycin       No             250mg      250 mg,           Un

[facility]



     (VANCOCIN)                                Oral,           ity of



     capsule 250      00:45: 00:34                          ONCE, 1           [facility]

[facility]



     mg        00   :00                           dose, On           Medical



                                                  Fri            Branch



                                                  22 at           



                                                  1845,           



                                                  ASAP<br>Fa           



                                                  culty           



                                                  member           



                                                  approving           



                                                  Non-formul           



                                                  maryanne            



                                                  medication           



                                                  :              



                                                  MEGADC<br>           



                                                  Reason for           



                                                  non-formul           



                                                  maryanne use:           



                                                  SPECIFIC           



                                                  INDICATION           



                                                  FOR            



                                                  NONFORMULA           



                                                  RY             



                                                  PRODUCT<br           



                                                  >Reason           



                                                  for            



                                                  Anti-Infec           



                                                  tive:           



                                                  Documented           



                                                  Infection<           



                                                  br>Documen           



                                                  huma            



                                                  Infection           



                                                  Site:           



                                                  Abdominal<           



                                                  br>Duratio           



                                                  n of           



                                                  Therapy:           



                                                  14 days           

 

     lidocaine      2022- No             5mL       5 mL,           Univer

s



     1% (PF)                                Subcutaneo           ity o

f



     (XYLOCAINE)      23:45: 02:25                          us, ONCE,           

[place]



     injection 5      00   :00                           1 dose, On           Me

dical



     mL                                           Fri            [facility]



                                                  22 at           



                                                  1745,           



                                                  Routine           

 

     NaCl 0.9%            Yes            10mL      10 mL,           Univer

s



     (NS)                                     Slow IV           ity of



     injection      23:38:                               Push, PRN,           [facility]

[facility]



     10 mL      41                                 Starting           Medical



                                                  on Fri           [facility]



                                                  22 at           



                                                  1738,           



                                                  Until           



                                                  Discontinu           



                                                  ed,            



                                                  Routine,           



                                                  line           



                                                  maintenanc           



                                                  e              

 

     meropenem      2022- No             1g        1 g, IV           Univ

ers



     (MERREM)                           Piggyback,           it

y of



     g in NaCl      23:15: 14:33                          Q8H ABX,           Eric

as



     0.9% (NS)      00   :35                           First dose           Medi

sarah



     100 mL                                         (after           Branch



     MINI-BAG                                         last           



                                                  modificati           



                                                  on) on Thu           



                                                  22 at           



                                                  1715,           



                                                  Until           



                                                  Discontinu           



                                                  ed,            



                                                  Administer           



                                                  over 60           



                                                  Minutes,           



                                                  100            



                                                  mL<br>Rest           



                                                  ricted use           



                                                  approved           



                                                  by: ADC           



                                                  PROVIDER<b           



                                                  r&gt;Reaso           



                                                  n for           



                                                  Anti-Infec           



                                                  tive:           



                                                  Documented           



                                                  Infection<           



                                                  br>Documen           



                                                  huma            



                                                  Infection           



                                                  Site:           



                                                  Urine<br>D           



                                                  uration of           



                                                  Therapy: 7           



                                                  days           

 

     enoxaparin            Yes            40mg      40 mg,           Unive

rs



     (LOVENOX)                                     Subcutaneo           ity 

of



     injection      23:00:                               us, DAILY,           Te

[facility]



     40 mg      00                                 First dose           Medical



                                                  on Thu           Branch



                                                  22 at           



                                                  1700,           



                                                  Until           



                                                  Discontinu           



                                                  ed,            



                                                  Routine           

 

     meropenem      2022- No             1g        1 g, IV           Univ

ers



     (MERREM)                           Piggyback,           it

y of



     g in NaCl      16:00: 20:33                          Q8H ABX,           Eric

as



     0.9% (NS)      00   :04                           First dose           Medi

sarah



     100 mL                                         (after           Branch



     MINI-BAG                                         last           



                                                  reorder)           



                                                  on Thu           



                                                  22 at           



                                                  1000,           



                                                  Until           



                                                  Discontinu           



                                                  ed,            



                                                  Administer           



                                                  over 60           



                                                  Minutes,           



                                                  100            



                                                  mL<br>Rest           



                                                  ricted use           



                                                  approved           



                                                  by: ADC           



                                                  PROVIDER<b           



                                                  r>Reason           



                                                  for            



                                                  Anti-Infec           



                                                  tive:           



                                                  Documented           



                                                  Infection<           



                                                  br>Documen           



                                                  huma            



                                                  Infection           



                                                  Site:           



                                                  Urine<br>D           



                                                  uration of           



                                                  Therapy:           



                                                  Other (see           



                                                  Comments)           

 

     HYDROmorpho      0 2022- No             .5mg      0.5 mg,           Un

[facility]



     ne                                  Slow IV           ity of



     (DILAUDID)      14:29: 20:41                          Push,           [place]



     injection      58   :17                           Q4HPRN,           Medical



     0.5 mg                                         Starting           [facility]



                                                  on u           



                                                  22 at           



                                                  0829,           



                                                  Until Sun           



                                                  22 at           



                                                  1441,           



                                                  Routine,           



                                                  Pain           



                                                  (scale           



                                                  7-10)<br>U           



                                                  se             



                                                  approved           



                                                  by             



                                                  (Faculty):           



                                                  ADC            



                                                  PROVIDER           

 

     proMETHazin            Yes            25mg      25 mg,           Univ

ers



     e         1-                               Oral,           ity of



     (PHENERGAN)      09:42:                               Q4HPRN,           Eric

as



     tablet 25      07                                 Starting           Medica

l



     mg                                           on Thu           [facility]



                                                  22 at           



                                                  0342,           



                                                  Until           



                                                  Discontinu           



                                                  ed,            



                                                  Routine,           



                                                  Nausea and           



                                                  Vomiting           



                                                  (N/V)           

 

     HYDROmorpho      2022- No             .5mg      0.5 mg,           Un

[facility]



     ne                                  Slow IV           ity of



     (DILAUDID)      09:41: 12:04                          Push,           [place]



     injection      36   :38                           Q4HPRN,           Medical



     0.5 mg                                         Starting           [facility]



                                                  on u           



                                                  22 at           



                                                  0341,           



                                                  Until u           



                                                  22 at           



                                                  0604,           



                                                  Routine,           



                                                  Pain           



                                                  (scale           



                                                  7-10)<br>U           



                                                  se             



                                                  approved           



                                                  by             



                                                  (Faculty):           



                                                  ADC            



                                                  PROVIDER           

 

     proCHLORper      0      Yes            10mg      10 mg,           Univ

ers



     azine      1-20                               Slow IV           ity of



     (COMPAZINE)      09:07:                               Push,           [place]



     injection      27                                 Q6HPRN,           Medical



     10 mg                                         Starting           [facility]



                                                  on Thu           



                                                  22 at           



                                                  0307,           



                                                  Until           



                                                  Discontinu           



                                                  ed,            



                                                  Routine,           



                                                  Nausea and           



                                                  Vomiting           



                                                  (N/V)           

 

     acetaminoph            Yes            650mg      650 mg,           Un

[facility]



     en        1-20                               Oral,           ity of



     (TYLENOL)      09:07:                               Q6HPRN,           [place]



     tablet 650      10                                 Starting           Medic

al



     mg                                           on Thu           [facility]



                                                  22 at           



                                                  0307,           



                                                  Until           



                                                  Discontinu           



                                                  ed,            



                                                  Routine,           



                                                  Pain           



                                                  (scale           



                                                  1-3)           

 

     meropenem      2022- No             1g        1 g, IV           Univ

ers



     (MERREM) 1                                Piggyback,           it

y of



     g in NaCl      07:15: 08:49                          ONCE, 1           [place]

[place]



     0.9% (NS)      00   :00                           dose, On           Medica

l



     100 mL                                         u            [facility]



     MINI-BAG                                         22 at           



                                                  0115,           



                                                  Administer           



                                                  over 60           



                                                  Minutes,           



                                                  100            



                                                  mL<br>Rest           



                                                  ricted use           



                                                  approved           



                                                  by: ADC           



                                                  PROVIDER<b           



                                                  r>Reason           



                                                  for            



                                                  Anti-Infec           



                                                  tive:           



                                                  Documented           



                                                  Infection<           



                                                  br>Documen           



                                                  huma            



                                                  Infection           



                                                  Site:           



                                                  Urine<br>D           



                                                  uration of           



                                                  Therapy:           



                                                  Other (see           



                                                  Comments)           

 

     cefdinir      2022- No             300mg      300 mg,           Univ

ers



     (OMNICEF)                                Oral,           ity of



     capsule 300      03:30: 02:55                          ONCE, 1           [facility]

[facility]



     mg        00   :00                           dose, On           Medical



                                                  Mon            [facility]



                                                  22 at           



                                                  2130,           



                                                  ASAP<br>Re           



                                                  ason for           



                                                  Anti-Infec           



                                                  tive:           



                                                  Documented           



                                                  Infection<           



                                                  br>Documen           



                                                  huma            



                                                  Infection           



                                                  Site:           



                                                  Urine<br>D           



                                                  uration of           



                                                  Therapy: 7           



                                                  days           

 

     FENTanyl PF      2022- No             50ug      50 mcg,           Un

[facility]



     (SUBLIMAZE                                Slow IV           ity o

f



     (PF))      01:15: 03:26                          Push,           [place]



     injection      00   :00                           ONCE, 1           Medical



     50 mcg                                         dose, On           [facility]



                                                  Mon            



                                                  22 at           



                                                  1915, STAT           

 

     proMETHazin      2022- No             25mg      25 mg, IV           

Univers



     e                                   Piggyback,           ity of



     (PHENERGAN)      01:15: 03:26                          ONCE, 1           [facility]

[facility]



     25 mg in      00   :00                           dose, On           Medical



     NaCl 0.9%                                         Mon            [facility]



     (NS) 50 mL                                         22 at           



     piggyback                                         1915, 50           



                                                  mL             

 

     cefdinir      2022- No        [id number] 300mg      Take 1           U

nivers



     300 mg                                capsule by           ity of



     capsule      00:00: 05:59                          mouth           [place]



               00   :00                           every 12           Medical



                                                  (twelve)           [facility]



                                                  hours for           



                                                  10 days.           

 

     cefdinir      2022- No        [id number] 300mg      Take 1           U

nivers



     300 mg                                capsule by           ity of



     capsule      00:00: 00:00                          mouth           [place]



               00   :00                           every 12           Medical



                                                  (twelve)           [facility]



                                                  hours for           



                                                  10 days.           

 

     FENTanyl PF      2022- No             50ug      50 mcg,           Un

[facility]



     (SUBLIMAZE                                Slow IV           ity o

f



     (PF))      02:00: 01:19                          Push,           [place]



     injection      00   :00                           ONCE, 1           Medical



     50 mcg                                         dose, On           [facility]



                                                  Sat            



                                                  1/15/22 at           



                                                  2000,           



                                                  Routine           

 

     methocarbam      2022- No             1000mg      1,000 mg,         

  [facility]



     oL                                  Oral,           ity of



     (ROBAXIN)      02:00: 01:19                          ONCE, 1           [place]

[facility]



     tablet      00   :00                           dose, On           Medical



     1,000 mg                                         Sat            [facility]



                                                  1/15/22 at           



                                                  2000, ASAP           

 

     proMETHazin      2022- No             12.5mg      12.5 mg,          

 [facility]



     e         1-15 01-15                          IV             ity of



     (PHENERGAN)      22:46: 23:00                          Piggyback,          

 [place]



     12.5 mg in      00   :00                           ONCE, 1           [facility]

l



     NaCl 0.9%                                         dose, On           [facility]



     (NS) 50 mL                                         Sat            



     piggyback                                         1/15/22 at           



                                                  1700, 50           



                                                  mL             

 

     FENTanyl PF      2022- No             50ug      50 mcg,           [facility]

[facility]



     (SUBLIMAZE      1-15 01-15                          Slow IV           ity o

f



     (PF))      22:45: 23:00                          Push,           [place]



     injection      00   :00                           ONCE, 1           Medical



     50 mcg                                         dose, On           [facility]



                                                  Sat            



                                                  1/15/22 at           



                                                  1645,           



                                                  Routine           

 

     methocarbam      -0      Yes       [id number] 1000mg      Take 2         

  [facility]



     oL 500 mg      1-15                               tablets by           ity 

of



     tablet      00:00:                               mouth 4           [place]



               00                                 (four)           Medical



                                                  times           [facility]



                                                  daily as           



                                                  needed for           



                                                  Pain           



                                                  (scale           



                                                  1-3).           

 

     methocarbam      202-0      Yes       [id number] 1000mg      Take 2         

  [facility]



     oL 500 mg      1-15                               tablets by           ity 

of



     tablet      00:00:                               mouth 4           [place]



               00                                 (four)           Medical



                                                  times           [facility]



                                                  daily as           



                                                  needed for           



                                                  Pain           



                                                  (scale           



                                                  1-3).           

 

     methocarbam            Yes       [id number] 1000mg      Take 2         

  [facility]



     oL 500 mg      1-15                               tablets by           ity 

of



     tablet      00:00:                               mouth 4           [place]



               00                                 (four)           Medical



                                                  times           [facility]



                                                  daily as           



                                                  needed for           



                                                  Pain           



                                                  (scale           



                                                  1-3).           

 

     methocarbam            Yes       [id number] 1000mg      Take 2         

  [facility]



     oL 500 mg      1-15                               tablets by           ity 

of



     tablet      00:00:                               mouth 4           [place]



               00                                 (four)           Medical



                                                  times           [facility]



                                                  daily as           



                                                  needed for           



                                                  Pain           



                                                  (scale           



                                                  1-3).           

 

     methocarbam            Yes       [id number] 1000mg      Take 2         

  [facility]



     oL 500 mg      1-15                               tablets by           ity 

of



     tablet      00:00:                               mouth 4           [place]



               00                                 (four)           Medical



                                                  times           [facility]



                                                  daily as           



                                                  needed for           



                                                  Pain           



                                                  (scale           



                                                  1-3).           

 

     methocarbam            Yes       [id number] 1000mg      Take 2         

  [facility]



     oL 500 mg      1-15                               tablets by           ity 

of



     tablet      00:00:                               mouth 4           [place]



               00                                 (four)           Medical



                                                  times           [facility]



                                                  daily as           



                                                  needed for           



                                                  Pain           



                                                  (scale           



                                                  1-3).           

 

     methocarbam      2022- No        [id number] 1000mg      Take 2        

   [facility]



     oL 500 mg      1-15 05-11                          tablets by           ity

 of



     tablet      00:00: 00:00                          mouth 4           [place]



               00   :00                           (four)           Medical



                                                  times           [facility]



                                                  daily as           



                                                  needed for           



                                                  Pain           



                                                  (scale           



                                                  1-3).           

 

     proMETHazin      2021- No             25mg      25 mg, IV           

[facility]



     e                                   Piggyback,           ity of



     (PHENERGAN)      23:15: 22:20                          ONCE, 1           [facility]

[facility]



     25 mg in      00   :00                           dose, On           Medical



     NaCl 0.9%                                         Wed            [facility]



     (NS) 50 mL                                         21           



     piggyback                                         at 1715,           



                                                  50 mL           

 

     FENTanyl PF      2021- No             75ug      75 mcg,           [facility]

[facility]



     (SUBLIMAZE                                Slow IV           ity o

f



     (PF))      22:15: 22:20                          Push,           [place]



     injection      00   :00                           ONCE, 1           Medical



     75 mcg                                         dose, On           [facility]



                                                  Wed            



                                                  21           



                                                  at 1615,           



                                                  Routine           

 

     fidaxomicin      2021      Yes       [id number] 200mg      Take 1          

 [facility]



     200 mg      1-24                               tablet by           ity of



     tablet      00:00:                               mouth 2           [place]



               00                                 (two)           Medical



                                                  times           [facility]



                                                  daily.           

 

     proMETHazin      2021      Yes       [id number] 25mg      Take 1           

[facility]



     e 25 mg      1-24                               tablet by           ity of



     tablet      00:00:                               mouth           [place]



               00                                 every 6           Medical



                                                  (six)           [facility]



                                                  hours as           



                                                  needed for           



                                                  Nausea and           



                                                  Vomiting           



                                                  (N/V).           

 

     fidaxomicin      2021      Yes       [id number] 200mg      Take 1          

 [facility]



     200 mg      1-24                               tablet by           ity of



     tablet      00:00:                               mouth 2           [place]



               00                                 (two)           Medical



                                                  times           [facility]



                                                  daily.           

 

     proMETHazin      2021      Yes       [id number] 25mg      Take 1           

[facility]



     e 25 mg      1-24                               tablet by           ity of



     tablet      00:00:                               mouth           [place]



               00                                 every 6           Medical



                                                  (six)           [facility]



                                                  hours as           



                                                  needed for           



                                                  Nausea and           



                                                  Vomiting           



                                                  (N/V).           

 

     cholestyram      2021      Yes                                     [facility]

s



     ine 4 gram      1-24                                              ity of



     packet      00:00:                                              [place]



                                                               [facility]



                                                                 [facility]

 

     fidaxomicin      2021      Yes       [id number] 200mg      Take 1          

 [facility]



     200 mg      1-24                               tablet by           ity of



     tablet      00:00:                               mouth 2           [place]



               00                                 (two)           Medical



                                                  times           [facility]



                                                  daily.           

 

     proMETHazin      2021      Yes       [id number] 25mg      Take 1           

[facility]



     e 25 mg      1-24                               tablet by           ity of



     tablet      00:00:                               mouth           [place]



               00                                 every 6           Medical



                                                  (six)           [facility]



                                                  hours as           



                                                  needed for           



                                                  Nausea and           



                                                  Vomiting           



                                                  (N/V).           

 

     cholestyram      2021      Yes                                     [facility]

s



     ine 4 gram      1-24                                              ity of



     packet      00:00:                                              [place]



                                                               [facility]



                                                                 [facility]

 

     cholestyram      2021      Yes                                     [facility]

s



     ine 4 gram      1-24                                              ity of



     packet      00:00:                                              [place]



               00                                                Medical



                                                                 [facility]

 

     cholestyram      2021      Yes                                     [facility]

s



     ine 4 gram      1-24                                              ity of



     packet      00:00:                                              [place]



                                                               [facility]



                                                                 [facility]

 

     cholestyram      2021      Yes                                     Un[facility]

s



     ine 4 gram      1-24                                              ity of



     packet      00:00:                                              [place]



               00                                                Medical



                                                                 [facility]

 

     cholestyram      2021      Yes                                     Univer

s



     ine 4 gram      1-24                                              ity of



     packet      00:00:                                              [place]



                                                               Medical



                                                                 [facility]

 

     fidaxomicin      2021      Yes       [id number] 200mg      Take 1          

 [facility]



     200 mg      1-24                               tablet by           ity of



     tablet      00:00:                               mouth 2           [place]



               00                                 (two)           Medical



                                                  times           [facility]



                                                  daily.           

 

     proMETHazin      2021      Yes       [id number] 25mg      Take 1           

[facility]



     e 25 mg      1-24                               tablet by           ity of



     tablet      00:00:                               mouth           [place]



               00                                 every 6           Medical



                                                  (six)           [facility]



                                                  hours as           



                                                  needed for           



                                                  Nausea and           



                                                  Vomiting           



                                                  (N/V).           

 

     cholestyram      2021 No                                      Un[facility]

rs



     ine 4 gram      -                                         ity of



     packet      00:00: 00:00                                         [place]



               00   :00                                          Medical



                                                                 [facility]

 

     fidaxomicin      2021- No        [id number] 200mg      Take 1         

  [facility]



     200 mg                                tablet by           ity of



     tablet      00:00: 00:00                          mouth 2           [place]



               00   :00                           (two)           Medical



                                                  times           [facility]



                                                  daily.           

 

     proMETHazin      2021- No        [id number] 25mg      Take 1          

 [facility]



     e 25 mg      25                          tablet by           ity of



     tablet      00:00: 00:00                          mouth           [place]



               00   :00                           every 6           Medical



                                                  (six)           [facility]



                                                  hours as           



                                                  needed for           



                                                  Nausea and           



                                                  Vomiting           



                                                  (N/V).           

 

     FENTanyl PF      2021- No             50ug      50 mcg,           Un

[facility]



     (SUBLIMAZE      5-10 05-10                          Intravenou           it

y of



     (PF))      02:45: 01:34                          s, ONCE, 1           [place]



     injection      00   :00                           dose, Sun           Medic

al



     50 mcg                                         21 at           [facility]



                                                  2145,           



                                                  Routine           

 

     cefTRIAXone       No             1000mg      1,000 mg,         

  [facility]



     (ROCEPHIN)      5-10 05-10                          IV             ity of



     1,000 mg in      02:30: 01:55                          Piggyback,          

 [place]



     NaCl 0.9%      00   :00                           ONCE, 1           Medical



     (NS) 50 mL                                         dose, [facility]

ch



     MINI-BAG                                         21 at           



                                                  2130, 50           



                                                  mL<br>Reas           



                                                  on for           



                                                  Anti-Infec           



                                                  tive:           



                                                  Documented           



                                                  Infection<           



                                                  br>Documen           



                                                  huma            



                                                  Infection           



                                                  Site:           



                                                  Urine<br>D           



                                                  uration of           



                                                  Therapy: 7           



                                                  days           

 

     famotidine       No             20mg      20 mg,           Univ

ers



     (PEPCID      5-10 05-10                          Slow IV           ity of



     (PF))      01:00: 00:12                          Push,           [place]



     injection      00   :00                           ONCE, 1           Medical



     20 mg                                         dose, Sun           [facility]



                                                  21 at           



                                                  2000, ASAP           

 

     proMETHazin      2021- No             25mg      25 mg, IV           

[facility]



     e         5-10 05-10                          Piggyback,           ity of



     (PHENERGAN)      00:45: 00:11                          ONCE, 1           [facility]

[facility]



     25 mg in      00   :00                           dose, Sun           [facility]

l



     NaCl 0.9%                                         21 at           [facility]

h



     (NS) 50 mL                                         1945, 50           



     piggyback                                         mL             

 

     FENTanyl PF       No             50ug      50 mcg,           Un

[facility]



     (SUBLIMAZE      5-10 05-10                          Intravenou           it

y of



     (PF))      00:45: 00:12                          s, ONCE, 1           [place]



     injection      00   :00                           dose, Sun           Medic

al



     50 mcg                                         21 at           [facility]



                                                  1945,           



                                                  Routine           

 

     NaCl 0.9%      2021- No             1000mL      at 999           Uni

vers



     (NS) bolus      5-10 05-10                          mL/hr,           ity of



     infusion      00:00: 01:47                          1,000 mL,           [facility]

as



     1,000 mL      00   :00                           IV             Medical



                                                  Infusion,           [place]



                                                  ONCE, 1           



                                                  dose, [place]           



                                                  21 at           



                                                  1900, ASAP           

 

     cefdinir      2021- No        [id number] 300mg      Take 1           U

nivers



     300 mg       05-20                          capsule by           ity of



     capsule      00:00: 04:59                          mouth 2           [place]



               00   :00                           (two)           Medical



                                                  times           [place]



                                                  daily for           



                                                  10 days.           

 

     buPROPion       No             150mg QD   Take 1           [facility]



     (WELLBUTRIN      1-17 01-16                          tablet           [facility]



     XL) 150 MG      00:00: 23:59                          (150 mg           Med

ical



     24 hr      00   :00                           total) by           Center



     tablet                                         mouth           



                                                  daily.           

 

     citalopram       No             40mg QD   Take 1           [facility]



     (CELEXA) 40      1-17 01-16                          tablet (40           [facility]

[facility]



     MG tablet      00:00: 23:59                          mg total)           Me

dical



               00   :00                           by mouth           Center



                                                  daily.           

 

     oxyCODONE-a            Yes            1{tbl}      Take 1           [facility]



     cetaminophe      1-16                               tablet by           Ni

es



     n         14:44:                               mouth           Medical



     (PERCOCET)      50                                 every 4           Center



      mg                                         (four)           



     per tablet                                         hours as           



                                                  needed for           



                                                  Pain.           

 

     oxyCODONE-a            Yes            1{tbl}      Take 1           [facility]



     cetaminophe      1-16                               tablet by           [facility]

es



     n         14:44:                               mouth           Medical



     (PERCOCET)      50                                 every 4           Center



      mg                                         (four)           



     per tablet                                         hours as           



                                                  needed for           



                                                  Pain.           

 

     oxyCODONE-a      2020-0      Yes            1{tbl}      Take 1           [facility]



     cetaminophe      1-16                               tablet by           [facility]

es



     n         14:44:                               mouth           Medical



     (PERCOCET)      50                                 every 4           [facility]



      mg                                         (four)           



     per tablet                                         hours as           



                                                  needed for           



                                                  Pain.           

 

     zinc      2020-0      Yes            5g   Q.5D Apply 5 g           [facility]



     oxide-yuan      1-16                               topically           [facility]

es



     latum      00:00:                               2 (two)           [facility]



     (CRITIC-AID      00                                 times           [facility]



     ) 20-51 %                                         daily.           



     Pste                                                        



     topical                                                        



     paste                                                        

 

     meropenem      2020-0      Yes            1g        Inject 1 g           [facility]



     (MERREM)      1-16                               intravenou           [facility]



     MBP 1 gm in      00:00:                               sly every           [facility]

[facility]



     100 mL NS      00                                 8 (eight)           [facility]

r



                                                  hours.           

 

     insulin      2020-0      Yes            58U  Q.5D Inject 58           [facility]

[facility]



     glargine      1-16                               Units           [facility]



     (LANTUS)      00:00:                               subcutaneo           Med

ical



     100 unit/mL      00                                 usly 2           [facility]



     injection                                         (two)           



                                                  times           



                                                  daily Use           



                                                  as             



                                                  directed.           

 

     zinc      2020-0      Yes            5g   Q.5D Apply 5 g           [facility]



     oxide-yuan      1-16                               topically           [facility]

es



     latum      00:00:                               2 (two)           [facility]



     (CRITIC-AID      00                                 times           [facility]



     ) 20-51 %                                         daily.           



     Pste                                                        



     topical                                                        



     paste                                                        

 

     meropenem      2020-0      Yes            1g        Inject 1 g           [facility]



     (MERREM)      1-16                               intravenou           [facility]



     MBP 1 gm in      00:00:                               sly every           [facility]

edical



     100 mL NS      00                                 8 (eight)           [facility]

r



                                                  hours.           

 

     insulin      2020-0      Yes            58U  Q.5D Inject 58           [facility]

t



     glargine      1-16                               Units           [facility]



     (LANTUS)      00:00:                               subcutaneo           [facility]

ical



     100 unit/mL      00                                 usly 2           [facility]



     injection                                         (two)           



                                                  times           



                                                  daily Use           



                                                  as             



                                                  directed.           

 

     zinc      2020-0      Yes            5g   Q.5D Apply 5 g           [facility]



     oxide-yuan      1-16                               topically           [facility]

es



     latum      00:00:                               2 (two)           [facility]



     (CRITIC-AID      00                                 times           [facility]



     ) 20-51 %                                         daily.           



     Pste                                                        



     topical                                                        



     paste                                                        

 

     meropenem      2020-0      Yes            1g        Inject 1 g           [facility]



     (MERREM)      1-16                               intravenou           [facility]



     MBP 1 gm in      00:00:                               sly every           [facility]

ed[facility]



     100 mL NS      00                                 8 (eight)           [facility]

r



                                                  hours.           

 

     insulin      2020-0      Yes            58U  Q.5D Inject 58           [facility]

t



     glargine      1-16                               Units           [facility]



     (LANTUS)      00:00:                               subcutaneo           Med

ical



     100 unit/mL      00                                 usly 2           [facility]



     injection                                         (two)           



                                                  times           



                                                  daily Use           



                                                  as             



                                                  directed.           

 

     insulin      2021- No             0U        Inject           [facility]



     lispro      1-16 01-15                          0-12 Units           [facility]



     (HUMALOG)      00:00: 23:59                          subcutaneo           [facility]

[facility]



     100 unit/mL      00   :00                           usly 3           [facility]



     injection                                         (three)           



                                                  times           



                                                  daily           



                                                  before           



                                                  meals.           

 

     insulin      2021- No             25U       Inject 25           [facility]



     lispro      1-16 01-15                          Units           [facility]



     (HUMALOG)      00:00: 23:59                          subcutaneo           [facility]

edical



     100 unit/mL      00   :00                           usly 3           [facility]



     injection                                         (three)           



                                                  times           



                                                  daily           



                                                  before           



                                                  meals.           

 

     normal      2018      No                       1,000 mL,           [facility]

[facility]



     saline 0.9%      08                               Rate: 100           l



     IV 1,000 mL      11:04:                               ml/hr,           [facility]

[facility]



               00                                 Infuse           



                                                  over: 10           



                                                  hr, Route:           



                                                  IV, Dosing           



                                                  Weight           



                                                  131.818           



                                                  kg, Total           



                                                  Volume:           



                                                  1,000,           



                                                  Start           



                                                  date:           



                                                  18           



                                                  5:04:00           



                                                  CST,           



                                                  Duration:           



                                                  30 day,           



                                                  Stop date:           



                                                  18           



                                                  5:03:00           



                                                  CST, 2.4,           



                                                  m2             

 

     normal      2018      No                       1,000 mL,           [facility]

a



     saline 0.9%      08                               Rate: 100           l



     IV 1,000 mL      11:04:                               ml/hr,           [facility]

[facility]



               00                                 Infuse           



                                                  over: 10           



                                                  hr, Route:           



                                                  IV, Dosing           



                                                  Weight           



                                                  131.818           



                                                  kg, Total           



                                                  Volume:           



                                                  1,000,           



                                                  Start           



                                                  date:           



                                                  18           



                                                  5:04:00           



                                                  CST,           



                                                  Duration:           



                                                  30 day,           



                                                  Stop date:           



                                                  18           



                                                  5:03:00           



                                                  CST, 2.4,           



                                                  m2             

 

     Magnesium      2018      No                       Notes:           [facility]

a



     Sulfate                                     WASTE: F/P           l



               10:36:                               - Sink; E           [facility]



                                                -              



                                                  Municipal           



                                                  Trash Bin           

 

     Isolyte S      2018      No                       Notes:           [facility]

a



     PH-7.4                                     (Same as:           l



     (Bolus) IV      10:36:                               Isolyte S           

rm                                 PH 7.4)           

 

     Magnesium      2018      No                       Notes:           [facility]

a



     Sulfate                                     WASTE: F/P           l



               10:36:                               - Sink; E           [place]



                                                -              



                                                  Municipal           



                                                  Trash Bin           

 

     Lubna S      2018      No                       Notes:           [facility]

a



     PH-7.4                                     (Same as:           l



     (Bolus) IV      10:36:                               Isolseth S           

rm



               00                                 PH 7.4)           

 

     Phenergan      2018      No                       12.5 mg,           [facility]

[facility]



               1-08                               0.5 mL,           l



               10:35:                               Route:           [place]                                 IVPB, Drug           



                                                  form: INJ,           



                                                  ONCE,           



                                                  Dosing           



                                                  Weight           



                                                  131.818,           



                                                  kg,            



                                                  Priority:           



                                                  STAT,           



                                                  Start           



                                                  date:           



                                                  18           



                                                  4:35:00           



                                                  CST, Stop           



                                                  date:           



                                                  18           



                                                  4:35:00           



                                                  CST            

 

     Phenergan      2018      No                       12.5 mg,           [facility]

[facility]



               1-08                               0.5 mL,           l



               10:35:                               Route:           [place]                                 IVPB, Drug           



                                                  form: INJ,           



                                                  ONCE,           



                                                  Dosing           



                                                  Weight           



                                                  131.818,           



                                                  kg,            



                                                  Priority:           



                                                  STAT,           



                                                  Start           



                                                  date:           



                                                  18           



                                                  4:35:00           



                                                  CST, Stop           



                                                  date:           



                                                  18           



                                                  4:35:00           



                                                  CST            

 

     Wellbutrin Wellbutrin 2018      No             1{table BID  Wellbutrin   

        



      MG  MG 0-04                     t_in_th       MG        

   



               00:00:                     e_morni                     



               00                       ng}                      

 

     Wellbutrin Wellbutrin 2018      No             1{table BID  Wellbutrin   

        



      MG  MG 0-04                     t_in_th       MG        

   



               00:00:                     e_morni                     



               00                       ng}                      

 

     Wellbutrin Wellbutrin 2018      No             1{table BID               

  



      MG  MG 0-04                     t_in_th                     



               00:00:                     e_morni                     



               00                       ng}                      

 

     Wellbutrin Wellbutrin 2018      No             1{table BID  Wellbutrin   

        



      MG  MG 0-04                     t_in_th       MG        

   



               00:00:                     e_morni                     



               00                       ng}                      

 

     Wellbutrin Wellbutrin -      No             1{table BID  Wellbutrin   

        



      MG  MG 0-04                     t_in_th       MG        

   



               00:00:                     e_morni                     



               00                       ng}                      

 

     Wellbutrin Wellbutrin 2018      No             1{table BID  Wellbutrin   

        



      MG  MG 0-04                     t_in_th       MG        

   



               00:00:                     e_morni                     



               00                       ng}                      

 

     Wellbutrin Wellbutrin 2018      No             1{table BID  Wellbutrin   

        



      MG  MG 0-04                     t_in_th       MG        

   



               00:00:                     e_morni                     



               00                       ng}                      

 

     Citalopram Citalopram       Yes  [facility] [place]                1 tablet     

      [facility]



     Hydrobromid Hydrobromid 7-16                                              [facility]

[facility]



     e    e    00:00:                                              - [facility]



                                                               [facility]

 

     Seroquel Seroquel       Yes  [facility] [place]                1 tablet         

  [facility]



               7-16                                              [facility]



               00:00:                                              - [facility]



                                                               [facility]

 

     Docusate            Yes                      100 mg = 1           [facility]

[facility]



     Sodium 100      5-08                               cap, PO,           l



     MG Oral      14:56:                               BID, 0           [facility]



     Capsule      00                                 Refill(s)           

 

     Zosyn            Yes                      0              [facility]



               5-08                               Refill(s)           l



               14:56:                                              [facility]



               00                                                

 

     celecoxib            Yes                      200 mg = 1           [facility]

[facility]



     200 mg oral      5-08                               cap, PO,           l



     capsule      14:56:                               BID, 0           [facility]



               00                                 Refill(s)           

 

     ascorbic            Yes                      500 mg = 1           [facility]

[facility]



     acid      5-08                               tab, PO,           l



               14:56:                               BID, 0           [facility]



               00                                 Refill(s)           

 

     acetaminoph            Yes                      1,000 mg =           

[facility]



     en 500 mg      5-08                               2 tab, PO,           l



     oral tablet      14:56:                               Q6Hnow, 0           [facility]

[facility]



               00                                 Refill(s)           

 

     Oxycodone            Yes                      5 mg = 1           [facility]

[facility]



     Hydrochlori      5-08                               tab, PO,           l



     de 5 MG      14:56:                               Q4H, PRN           [facility]

n



     Oral Tablet      00                                 Pain Score           



                                                  4-6, 0           



                                                  Refill(s)           

 

     zinc            Yes                      220 mg = 1           [facility]



     sulfate 220      5-08                               cap, PO,           l



     mg oral      14:56:                               Daily, 0           [facility]

n



     capsule      00                                 Refill(s)           

 

     multivitami      0      Yes                      1 tab, PO,           

[facility]



     n         5-08                               Daily, 0           l



               14:56:                               Refill(s)           [place]



               00                                                

 

     methocarbam            Yes                      1,000 mg =           

[facility]



     ol 500 mg      5-08                               2 tab, PO,           l



     oral tablet      14:56:                               Q8H, 0           [facility]

[facility]



               00                                 Refill(s)           

 

     LORazepam            Yes                      0.5 mg = 1           [facility]

[facility]



     0.5 mg oral      5-08                               tab, PO,           l



     tablet      14:56:                               Q8H, PRN           [facility]



               00                                 Anxiety, 0           



                                                  Refill(s)           

 

     Lidocaine            Yes                      3 patch,           [facility]

[facility]



     Hydrochlori      5-08                               TOP,           l



     de 0.05      14:56:                               Daily,           [place]



     MG/MG      00                                 Remove           



     Transdermal                                         after 12           



     Patch                                         hours, 0           



     [Lidoderm]                                         Refill(s)           

 

     Docusate            Yes                      100 mg = 1           [facility]

[facility]



     Sodium 100      5-08                               cap, PO,           l



     MG Oral      14:56:                               BID, 0           [facility]



     Capsule      00                                 Refill(s)           

 

     Zosyn            Yes                      0              [facility]



               5-08                               Refill(s)           l



               14:56:                                              [facility]



               00                                                

 

     celecoxib            Yes                      200 mg = 1           [facility]

[facility]



     200 mg oral      5-08                               cap, PO,           l



     capsule      14:56:                               BID, 0           [place]



               00                                 Refill(s)           

 

     ascorbic            Yes                      500 mg = 1           [facility]

[facility]



     acid      5-08                               tab, PO,           l



               14:56:                               BID, 0           [facility]



               00                                 Refill(s)           

 

     acetaminoph            Yes                      1,000 mg =           

[facility]



     en 500 mg      5-08                               2 tab, PO,           l



     oral tablet      14:56:                               Q6Hnow, 0           [facility]

[facility]



               00                                 Refill(s)           

 

     Oxycodone            Yes                      5 mg = 1           [facility]

[facility]



     Hydrochlori      5-08                               tab, PO,           l



     de 5 MG      14:56:                               Q4H, PRN           [facility]

n



     Oral Tablet      00                                 Pain Score           



                                                  4-6, 0           



                                                  Refill(s)           

 

     zinc            Yes                      220 mg = 1           [facility]



     sulfate 220      5-08                               cap, PO,           l



     mg oral      14:56:                               Daily, 0           [facility]

n



     capsule      00                                 Refill(s)           

 

     multivitami            Yes                      1 tab, PO,           

[facility]



     n         5-08                               Daily, 0           l



               14:56:                               Refill(s)           [facility]



               00                                                

 

     methocarbam            Yes                      1,000 mg =           

[facility]



     ol 500 mg      5-08                               2 tab, PO,           l



     oral tablet      14:56:                               Q8H, 0           [facility]

[facility]



               00                                 Refill(s)           

 

     LORazepam            Yes                      0.5 mg = 1           [facility]

[facility]



     0.5 mg oral      5-08                               tab, PO,           l



     tablet      14:56:                               Q8H, PRN           [facility]



               00                                 Anxiety, 0           



                                                  Refill(s)           

 

     Lidocaine            Yes                      3 patch,           [facility]

[facility]



     Hydrochlori      5-08                               TOP,           l



     de 0.05      14:56:                               Daily,           [facility]



     MG/MG      00                                 Remove           



     Transdermal                                         after 12           



     Patch                                         hours, 0           



     [Lidoderm]                                         Refill(s)           

 

     Robaxin            No                       Notes:           [facility]



               5-06                               (Same           l



               21:00:                               as:Robaxin           [facility]



                                                )              

 

     Robaxin            No                       Notes:           [facility]



               5-06                               (Same           l



               21:00:                               as:Robaxin           [facility]



                                                )              

 

     Oxycodone            No                       Notes:           [facility]

a



     Hydrochlori      5-06                               (Same as:           l



     de 5 MG      18:06:                               Roxicodone           [facility]

[facility]



     Oral Tablet      00                                 )              

 

     Oxycodone            No                       Notes:           [facility]

a



     Hydrochlori      5-06                               (Same as:           l



     de 5 MG      18:06:                               Roxicodone           [facility]

[facility]



     Oral Tablet      00                                 )              

 

     Ativan            No                       Notes:           [facility]



               5-06                               (Same as:           l



               15:18:                               Ativan)           [facility]



               00                                                

 

     Ativan            No                       Notes:           [facility]



               5-06                               (Same as:           l



               15:18:                               Ativan)           [facility]



               00                                                

 

     Trazodone            No                       Notes:           [facility]

a



     Hydrochlori      5-06                               (Same As:           l



     de 50 MG      02:00:                               Desyrel)           [facility]

nn



     Oral Tablet      00                                                

 

     remove            No                       Notes:           [facility]



     patch      5-06                               Remove           l



               02:00:                               patch 12           [facility]



               00                                 hours           



                                                  after           



                                                  applicatio           



                                                  n each           



                                                  day.           

 

     Trazodone            No                       Notes:           [facility]

a



     Hydrochlori      5-06                               (Same As:           l



     de 50 MG      02:00:                               Desyrel)           [facility]

nn



     Oral Tablet      00                                                

 

     remove            No                       Notes:           [facility]



     patch      5-06                               Remove           l



               02:00:                               patch 12           [facility]



               00                                 hours           



                                                  after           



                                                  applicatio           



                                                  n each           



                                                  day.           

 

     Oxycodone            No                       Notes:           [facility]

a



     Hydrochlori      5-05                               (Same as:           l



     de 5 MG      22:01:                               Roxicodone           [facility]

[facility]



     Oral Tablet      00                                 )              

 

     Oxycodone            No                       Notes:           [facility]

a



     Hydrochlori      5-05                               (Same as:           l



     de 5 MG      22:01:                               Roxicodone           [facility]

[facility]



     Oral Tablet      00                                 )              

 

     Celebrex            No                       Notes:           [facility]



               5-05                               NSAID.           l



               22:00:                               Please           [facility]



               00                                 check           



                                                  indication           



                                                  . Not for           



                                                  seizure.           



                                                  (Same As:           



                                                  CeleBREX)           

 

     Celebrex            No                       Notes:           [facility]



               5-05                               NSAID.           l



               22:00:                               Please           [facility]



               00                                 check           



                                                  indication           



                                                  . Not for           



                                                  seizure.           



                                                  (Same As:           



                                                  CeleBREX)           

 

     Vancomycin            No                         mg:           [facility]

[facility]



               5-05                               infuse           l



               21:00:                               over 2.5           [facility]



               00                                 hours           

 

     Vancomycin            No                         mg:           [facility]

[facility]



               5-05                               infuse           l



               21:00:                               over 2.5           [facility]



               00                                 hours           

 

     Lidocaine            No                       Notes:           [facility]

a



     Hydrochlori      5-05                               Apply only           l



     de 0.05      14:00:                               once for           [facility]

n



     MG/MG      00                                 up to 12           



     Transdermal                                         hours in a           



     Patch                                         24-hour           



     [Lidoderm]                                         period (12           



                                                  hours on           



                                                  and 12           



                                                  hours           



                                                  off).           



                                                  (Same as:           



                                                  Lidoderm)           



                                                  "Remove           



                                                  old patch           



                                                  before           



                                                  applicatio           



                                                  n of new           



                                                  patch"           

 

     Lidocaine            No                       Notes:           [facility]

a



     Hydrochlori      5-05                               Apply only           l



     de 0.05      14:00:                               once for           [facility]

n



     MG/MG      00                                 up to 12           



     Transdermal                                         hours in a           



     Patch                                         24-hour           



     [Lidoderm]                                         period (12           



                                                  hours on           



                                                  and 12           



                                                  hours           



                                                  off).           



                                                  (Same as:           



                                                  Lidoderm)           



                                                  "Remove           



                                                  old patch           



                                                  before           



                                                  applicatio           



                                                  n of new           



                                                  patch"           

 

     Phenergan            No                       Notes: Do           [facility]

[facility]



               5-04                               not give           l



               22:40:                               IV push.           [facility]



                                                (Same as:           



                                                  Phenergan)           

 

     Dilaudid            No                       Notes:           [facility]



               5-04                               Same as           l



               22:40:                               Dilaudid           [facility]                                                

 

     Phenergan            No                       Notes: Do           [facility]

[facility]



               5-04                               not give           l



               22:40:                               IV push.           [facility]



                                                (Same as:           



                                                  Phenergan)           

 

     Dilaudid            No                       Notes:           [facility]



               5-04                               Same as           l



               22:40:                               Dilaudid                                                           

 

     Tramadol            No                       Notes: Not           [facility]

[facility]



               5-04                               to exceed           l



               22:00:                               400mg/day.           [facility]



                                                (Same As:           



                                                  Ultram)           

 

     gabapentin            No                       Notes:           [facility]

[facility]



               5-04                               (Same as:           l



               22:00:                               Neurontin)                                                           

 

     Acetaminoph            No                       Notes: Max           

[facility]



     en        -04                               acetaminop           l



               22:00:                               hen 4000           [facility]                                 mg/day (4           



                                                  gm/day).           



                                                  (Same as:           



                                                  Tylenol           



                                                  Extra           



                                                  Strength)           

 

     Robaxin            No                       Notes:           [facility]



               5-04                               (Same           l



               22:00:                               as:Robaxin           [facility]



                                                )              

 

     Tramadol            No                       Notes: Not           [facility]

[facility]



               5-04                               to exceed           l



               22:00:                               400mg/day.           [facility]



               00                                 (Same As:           



                                                  Ultram)           

 

     gabapentin            No                       Notes:           [facility]

[facility]



               5-04                               (Same as:           l



               22:00:                               Neurontin)           [facility]



                                                               

 

     Acetaminoph            No                       Notes: Max           

[facility]



     en        5-04                               acetaminop           l



               22:00:                               hen 4000           [facility]



               00                                 mg/day (4           



                                                  gm/day).           



                                                  (Same as:           



                                                  Tylenol           



                                                  Extra           



                                                  Strength)           

 

     Robaxin            No                       Notes:           [facility]



               5-04                               (Same           l



               22:00:                               as:Robaxin           [facility]



                                                )              

 

     Oxycodone            No                       Notes:           [facility]

a



     Hydrochlori      5-04                               (Same as:           l



     de 5 MG      21:33:                               Roxicodone           [facility]

[facility]



     Oral Tablet      00                                 )              

 

     Oxycodone            No                       Notes:           [facility]

a



     Hydrochlori      5-04                               (Same as:           l



     de 5 MG      21:33:                               Roxicodone           [facility]

[facility]



     Oral Tablet      00                                 )              

 

     Beneprotein            No                       Notes:           [facility]

[facility]



     7 gm pkt      5-04                               (Same as:           l



               21:30:                               Beneprotei           [facility]



               00                                 n)             

 

     Beneprotein            No                       Notes:           [facility]

[facility]



     7 gm pkt      5-04                               (Same as:           l



               21:30:                               Beneprotei           [facility]



               00                                 n)             

 

     Acetaminoph            No                       1 tab, PO,           

[facility]



     en 325 MG /      5-04                               TID, 0           l



     Oxycodone      17:12:                               Refill(s)           [facility]

[facility]



     Hydrochlori      00                                                



     de 10 MG                                                        



     Oral Tablet                                                        



     [Percocet                                                        



     10/325]                                                        

 

     Acetaminoph            No                       1 tab, PO,           

[facility]



     en 325 MG /      5-04                               TID, 0           l



     Oxycodone      17:12:                               Refill(s)           [facility]

[facility]



     Hydrochlori      00                                                



     de 10 MG                                                        



     Oral Tablet                                                        



     [Percocet                                                        



     10/325]                                                        

 

     Dilaudid            No                       0.5 mg,           [facility]

a



               5-04                               0.25 mL,           l



               16:01:                               Route:                                            IVP, Drug           



                                                  form: INJ,           



                                                  ONCE,           



                                                  Start           



                                                  date:           



                                                  18           



                                                  11:01:00           



                                                  CDT, Stop           



                                                  date:           



                                                  18           



                                                  11:01:00           



                                                  CDT            

 

     Dilaudid            No                       0.5 mg,           [facility]

a



               5-04                               0.25 mL,           l



               16:01:                               Route:           [facility]                                 IVP, Drug           



                                                  form: INJ,           



                                                  ONCE,           



                                                  Start           



                                                  date:           



                                                  18           



                                                  11:01:00           



                                                  CDT, Stop           



                                                  date:           



                                                  18           



                                                  11:01:00           



                                                  CDT            

 

     Phenergan            No                       Notes:           [facility]

a



               5-04                               (Same as:           l



               15:43:                               Phenergan)                                                           

 

     Dilaudid            No                       2 mg,           [facility]



               5-04                               Route:           l



               15:43:                               IVP, ONCE,                                            Dosing           



                                                  Weight           



                                                  127.027,           



                                                  kg,            



                                                  Priority:           



                                                  STAT,           



                                                  Start           



                                                  date:           



                                                  18           



                                                  10:43:00           



                                                  CDT, Stop           



                                                  date:           



                                                  18           



                                                  10:43:00           



                                                  CDT            

 

     Phenergan            No                       Notes:           [facility]

a



               5-04                               (Same as:           l



               15:43:                               Phenergan)                                                           

 

     Dilaudid            No                       2 mg,           [facility]



               5-04                               Route:           l



               15:43:                               IVP, ONCE,                                            Dosing           



                                                  Weight           



                                                  127.027,           



                                                  kg,            



                                                  Priority:           



                                                  STAT,           



                                                  Start           



                                                  date:           



                                                  18           



                                                  10:43:00           



                                                  CDT, Stop           



                                                  date:           



                                                  18           



                                                  10:43:00           



                                                  CDT            

 

     Docusate            No                       Notes:           [facility]



               5-04                               (Same as:           l



               14:00:                               Colace)                                            (Do Not           



                                                  Crush)           

 

     Zinc            No                       Notes:           [facility]



     Sulfate      5-04                               (Zinc           l



               14:00:                               sulfate                                            capsule) -           



                                                  220 mg           



                                                  Zinc           



                                                  sulfate =           



                                                  50 mg           



                                                  elemental           



                                                  zinc Same           



                                                  as Zinc           



                                                  Sulfate           

 

     ascorbic            No                       Notes:           [facility]



     acid      5-04                               (Same as:           l



               14:00:                               Vitamin C)                                                           

 

     multivitami            No                       Notes:           [facility]

[facility]



     n         5-04                               (Same           l



               14:00:                               as:[facility])                                            WASTE: F/P           



                                                  - Black; E           



                                                  -              



                                                  Municipal           



                                                  Trash Bin           



                                                  Take with           



                                                  food.           

 

     Docusate            No                       Notes:           [facility]



               5-04                               (Same as:           l



               14:00:                               Colace)                                            (Do Not           



                                                  Crush)           

 

     Zinc            No                       Notes:           [facility]



     Sulfate      5-04                               (Zinc           l



               14:00:                               sulfate           [facility]                                 capsule) -           



                                                  220 mg           



                                                  Zinc           



                                                  sulfate =           



                                                  50 mg           



                                                  elemental           



                                                  zinc Same           



                                                  as Zinc           



                                                  Sulfate           

 

     ascorbic            No                       Notes:           [facility]



     acid      -04                               (Same as:           l



               14:00:                               Vitamin C)           [place]                                                

 

     multivitami            No                       Notes:           [facility]

[facility]



     n         -                               (Same           l



               14:00:                               as:[facility]ra)                                            WASTE: F/P           



                                                  - Black; E           



                                                  -              



                                                  Municipal           



                                                  Trash Bin           



                                                  Take with           



                                                  food.           

 

     Naproxen            No                       Notes:           [facility]



               5-04                               (Same as:           l



               07:00:                               Naprosyn)           [facility]                                 Take with           



                                                  food.           

 

     Zosyn            No                       Notes:           [facility]



               5-04                               (Same as:           l



               07:00:                               Zosyn)           [place]                                 Dosing           



                                                  based on           



                                                  Piperac[place]           



                                                  in             



                                                  component           



                                                  ***            



                                                  MEDICATION           



                                                  WASTE ***           



                                                  Product           



                                                  Size: 3375           



                                                  mg Product           



                                                  Wasted:           



                                                  ___ mg           

 

     Vancomycin            No                        2001 mg:           [facility]

[facility]



               5-04                               infuse           l



               07:00:                               over 2.5           [facility]



               00                                 hours For           



                                                  adult           



                                                  patients           



                                                  only:           



                                                  Round to           



                                                  nearest           



                                                  250 mg per           



                                                  Medical           



                                                  Staff           



                                                  approval           



                                                  ***            



                                                  MEDICATION           



                                                  WASTE ***           



                                                  Product           



                                                  Size: 1000           



                                                  mg Product           



                                                  Wasted:           



                                                  ___ mg           

 

     Enoxaparin            No                       Notes:           [facility]

[facility]



               -04                               (Same as:           l



               07:00:                               Lovenox)           [facility]



                                                               

 

     Naproxen            No                       Notes:           [facility]



               5-04                               (Same as:           l



               07:00:                               Naprosyn)           [facility]



                                                Take with           



                                                  food.           

 

     Zosyn            No                       Notes:           [facility]



               -04                               (Same as:           l



               07:00:                               Zosyn)           [facility]



                                                Dosing           



                                                  based on           



                                                  Piperacill           



                                                  in             



                                                  component           



                                                  ***            



                                                  MEDICATION           



                                                  WASTE ***           



                                                  Product           



                                                  Size: 3375           



                                                  mg Product           



                                                  Wasted:           



                                                  ___ mg           

 

     Vancomycin      -      No                        2001 mg:           [facility]

[facility]



               5-04                               infuse           l



               07:00:                               over 2.5           [place]



               00                                 hours For           



                                                  adult           



                                                  patients           



                                                  only:           



                                                  Round to           



                                                  nearest           



                                                  250 mg per           



                                                  Medical           



                                                  Staff           



                                                  approval           



                                                  ***            



                                                  MEDICATION           



                                                  WASTE ***           



                                                  Product           



                                                  Size: 1000           



                                                  mg Product           



                                                  Wasted:           



                                                  ___ mg           

 

     Enoxaparin            No                       Notes:           [facility]

[facility]



               5-04                               (Same as:           l



               07:00:                               Lovenox)           [place]



               00                                                

 

     Sodium            No                       1,000 mL,           [facility]

a



     Chloride      5-04                               Rate: 125           l



     0.9% IV      06:46:                               ml/hr,           [facility]



     1,000 mL      00                                 Infuse           



                                                  over: 8           



                                                  hr, Route:           



                                                  IV, Dosing           



                                                  Weight           



                                                  127.27 kg,           



                                                  Total           



                                                  Volume:           



                                                  1,000,           



                                                  Start           



                                                  date:           



                                                  18           



                                                  1:46:00           



                                                  CDT,           



                                                  Duration:           



                                                  30 day,           



                                                  Stop date:           



                                                  18           



                                                  1:45:00           



                                                  CDT, 2.44,           



                                                  m2             

 

     Saline            No                       Notes:           [facility]



     Flush 0.9%      5-04                               (Same as:           l



               06:46:                               BD             [place]



               00                                 Posiflush)           

 

     Acetaminoph            No                       Notes: Do           [facility]

[facility]



     en        5-04                               not exceed           l



               06:46:                               4 gm/day.           [facility]



               00                                 (Same as:           



                                                  Tylenol)           

 

     Acetaminoph            No                       Notes:           [facility]

[facility]



     en 325 MG /      5-04                               (Same as:           l



     Hydrocodone      06:46:                               Norco           [facility]

nn



     Bitartrate      00                                 325/5) Do           



     5 MG Oral                                         not exceed           



     Tablet                                         4gm/day of           



                                                  acetaminop           



                                                  hen.           

 

     Sodium            No                       1,000 mL,           [facility]

a



     Chloride      5-04                               Rate: 125           l



     0.9% IV      06:46:                               ml/hr,           [facility]



     1,000 mL      00                                 Infuse           



                                                  over: 8           



                                                  hr, Route:           



                                                  IV, Dosing           



                                                  Weight           



                                                  127.27 kg,           



                                                  Total           



                                                  Volume:           



                                                  1,000,           



                                                  Start           



                                                  date:           



                                                  18           



                                                  1:46:00           



                                                  CDT,           



                                                  Duration:           



                                                  30 day,           



                                                  Stop date:           



                                                  18           



                                                  1:45:00           



                                                  CDT, 2.44,           



                                                  m2             

 

     Saline            No                       Notes:           [facility]



     Flush 0.9%      5-04                               (Same as:           l



               06:46:                               BD             [place]



               00                                 Posiflush)           

 

     Acetaminoph            No                       Notes: Do           [facility]

[facility]



     en        5-04                               not exceed           l



               06:46:                               4 gm/day.           [place]



               00                                 (Same as:           



                                                  Tylenol)           

 

     Acetaminoph            No                       Notes:           [facility]

[facility]



     en 325 MG /      5-04                               (Same as:           l



     Hydrocodone      06:46:                               Norco           [facility]

nn



     Bitartrate      00                                 325/5) Do           



     5 MG Oral                                         not exceed           



     Tablet                                         4gm/day of           



                                                  acetaminop           



                                                  hen.           

 

     Reglan            No                       Notes:           [facility]



               5-04                               (Same as:           l



               04:27:                               Reglan)           [facility]



               00                                                

 

     Benadryl            No                       Notes:           [facility]



               5-04                               (Same as:           l



               04:27:                               Benadryl)           [facility]



                                                               

 

     Reglan            No                       Notes:           [facility]



               5-04                               (Same as:           l



               04:27:                               Reglan)           [place]



                                                               

 

     Benadryl            No                       Notes:           [facility]



               5-04                               (Same as:           l



               04:27:                               Benadryl)           [facility]



                                                               

 

     Magnesium            No                       Notes:           [facility]

a



     Sulfate      5-04                               WASTE: F/P           l



               04:26:                               - Sink; E           [place]



                                                -              



                                                  Municipal           



                                                  Trash Bin           

 

     Sodium            No                       1,000 mL,           [facility]

a



     Chloride      5-04                               1000           l



     0.9%      04:26:                               ml/hr,           [facility]



     (Bolus) IV      00                                 Infuse           



                                                  Over: 1           



                                                  hr, Route:           



                                                  IV, 1,000,           



                                                  Drug form:           



                                                  INJ, ONCE,           



                                                  Priority:           



                                                  STAT,           



                                                  Dosing           



                                                  Weight           



                                                  127.273           



                                                  kg, Start           



                                                  date:           



                                                  18           



                                                  23:26:00           



                                                  CDT, Stop           



                                                  date:           



                                                  18           



                                                  23:26:00           



                                                  CDT            

 

     Magnesium            No                       Notes:           [facility]

a



     Sulfate      5-04                               WASTE: F/P           l



               04:26:                               - Sink; E           [place]



                                                -              



                                                  Municipal           



                                                  Trash Bin           

 

     Sodium            No                       1,000 mL,           [facility]

a



     Chloride      5-04                               1000           l



     0.9%      04:26:                               ml/hr,           [place]



     (Bolus) IV      00                                 Infuse           



                                                  Over: 1           



                                                  hr, Route:           



                                                  IV, 1,000,           



                                                  Drug form:           



                                                  INJ, ONCE,           



                                                  Priority:           



                                                  STAT,           



                                                  Dosing           



                                                  Weight           



                                                  127.273           



                                                  kg, Start           



                                                  date:           



                                                  18           



                                                  23:26:00           



                                                  CDT, Stop           



                                                  date:           



                                                  18           



                                                  23:26:00           



                                                  CDT            

 

     Zosyn      -      No                       4.5 gm,           [facility]



               5-04                               Route:           l



               04:10:                               IVPB,           [place]



               00                                 ONCE,           



                                                  Dosing           



                                                  Weight           



                                                  127.273,           



                                                  kg,            



                                                  Priority:           



                                                  STAT,           



                                                  Start           



                                                  date:           



                                                  18           



                                                  23:10:00           



                                                  CDT, Stop           



                                                  date:           



                                                  18           



                                                  23:10:00           



                                                  CDT, ABX           



                                                  Indication           



                                                  :              



                                                  Bacteremia           

 

     Vancomycin      -      No                         mg:           [facility]

[facility]



               5-04                               infuse           l



               04:10:                               over 2.5           [facility]



               00                                 hours For           



                                                  adult           



                                                  patients           



                                                  only:           



                                                  Round to           



                                                  nearest           



                                                  250 mg per           



                                                  Medical           



                                                  Staff           



                                                  approval           



                                                  ***            



                                                  MEDICATION           



                                                  WASTE ***           



                                                  Product           



                                                  Size: 1000           



                                                  mg Product           



                                                  Wasted:           



                                                  ___ mg           

 

     Zosyn            No                       4.5 gm,           [facility]



                                              Route:           l



               04:10:                               IVPB,           [facility]



               00                                 ONCE,           



                                                  Dosing           



                                                  Weight           



                                                  127.273,           



                                                  kg,            



                                                  Priority:           



                                                  STAT,           



                                                  Start           



                                                  date:           



                                                  18           



                                                  23:10:00           



                                                  CDT, Stop           



                                                  date:           



                                                  18           



                                                  23:10:00           



                                                  CDT, ABX           



                                                  Indication           



                                                  :              



                                                  Bacteremia           

 

     Vancomycin            No                         mg:           [facility]

[facility]



                                              infuse           l



               04:10:                               over 2.5           [place]



               00                                 hours For           



                                                  adult           



                                                  patients           



                                                  only:           



                                                  Round to           



                                                  nearest           



                                                  250 mg per           



                                                  Medical           



                                                  Staff           



                                                  approval           



                                                  ***            



                                                  MEDICATION           



                                                  WASTE ***           



                                                  Product           



                                                  Size: 1000           



                                                  mg Product           



                                                  Wasted:           



                                                  ___ mg           

 

     normal            No                       1,000 mL,           [facility]

a



     saline 0.9%                                     Rate: 75           l



     IV 1,000 mL      03:39:                               ml/hr,           [facility]

[facility]



               00                                 Infuse           



                                                  over: 13.3           



                                                  hr, Route:           



                                                  IV, Dosing           



                                                  Weight           



                                                  127.273           



                                                  kg, Total           



                                                  Volume:           



                                                  1,000,           



                                                  Start           



                                                  date:           



                                                  18           



                                                  22:39:00           



                                                  CDT,           



                                                  Duration:           



                                                  30 day,           



                                                  Stop date:           



                                                  18           



                                                  22:38:00           



                                                  CDT, 2.36,           



                                                  m2             

 

     normal            No                       1,000 mL,           [facility]

a



     saline 0.9%                                     Rate: 75           l



     IV 1,000 mL      03:39:                               ml/hr,           [facility]

j[facility]



               00                                 Infuse           



                                                  over: 13.3           



                                                  hr, Route:           



                                                  IV, Dosing           



                                                  Weight           



                                                  127.273           



                                                  kg, Total           



                                                  Volume:           



                                                  1,000,           



                                                  Start           



                                                  date:           



                                                  18           



                                                  22:39:00           



                                                  CDT,           



                                                  Duration:           



                                                  30 day,           



                                                  Stop date:           



                                                  18           



                                                  22:38:00           



                                                  CDT, 2.36,           



                                                  m2             

 

     Acetaminoph            No                       Notes: Max           

[facility]



     en        -                               acetaminop           l



               02:56:                               hen 4000           [place]



               00                                 mg/day (4           



                                                  gm/day).           



                                                  (Same as:           



                                                  Tylenol           



                                                  Extra           



                                                  Strength)           

 

     Acetaminoph            No                       Notes: Max           

[facility]



     en        -04                               acetaminop           l



               02:56:                               hen 4000           [facility]



               00                                 mg/day (4           



                                                  gm/day).           



                                                  (Same as:           



                                                  Tylenol           



                                                  Extra           



                                                  Strength)           

 

     Rocephin            No                       Notes:           [facility]



               -04                               (Same As:           l



               02:21:                               Rocephin).           [facility]                                 ***            



                                                  MEDICATION           



                                                  WASTE ***           



                                                  Product           



                                                  Size: 1000           



                                                  mg Product           



                                                  Wasted:           



                                                  _0__ mg           

 

     Rocephin      -0      No                       Notes:           [facility]



               5-04                               (Same As:           l



               02:21:                               Rocephin).           [place]                                 ***            



                                                  MEDICATION           



                                                  WASTE ***           



                                                  Product           



                                                  Size: 1000           



                                                  mg Product           



                                                  Wasted:           



                                                  _0__ mg           

 

     Morphine      2018-0      No                       4 mg,           [facility]



               5-                               Route:           l



               00:05:                               IVP, ONCE,                                            Dosing           



                                                  Weight           



                                                  127.273,           



                                                  kg,            



                                                  Priority:           



                                                  STAT,           



                                                  Start           



                                                  date:           



                                                  18           



                                                  19:05:00           



                                                  CDT, Stop           



                                                  date:           



                                                  18           



                                                  19:05:00           



                                                  CDT            

 

     Morphine      -0      No                       4 mg,           [facility]



               5-                               Route:           l



               00:05:                               IVP, ONCE,                                            Dosing           



                                                  Weight           



                                                  127.273,           



                                                  kg,            



                                                  Priority:           



                                                  STAT,           



                                                  Start           



                                                  date:           



                                                  18           



                                                  19:05:00           



                                                  CDT, Stop           



                                                  date:           



                                                  18           



                                                  19:05:00           



                                                  CDT            

 

     Benadryl      -0      No                       Notes:           [facility]



               5-03                               (Same as:           l



               23:14:                               Benadryl)                                                           

 

     Benadryl            No                       Notes:           [facility]



               5-03                               (Same as:           l



               23:14:                               Benadryl)                                                           

 

     Benadryl      0      No                       Notes:           [facility]



               5-03                               (Same as:           l



               22:56:                               Benadryl)                                                           

 

     Morphine      -0      No                       4 mg, 1           [facility]

a



               5-03                               mL, Route:           l



               22:56:                               IVP, Drug                                            form:           



                                                  SOLN,           



                                                  ONCE,           



                                                  Dosing           



                                                  Weight           



                                                  127.273,           



                                                  kg,            



                                                  Priority:           



                                                  STAT,           



                                                  Start           



                                                  date:           



                                                  18           



                                                  17:56:00           



                                                  CDT, Stop           



                                                  date:           



                                                  18           



                                                  17:56:00           



                                                  CDT            

 

     Benadryl      -0      No                       Notes:           [facility]



               5-03                               (Same as:           l



               22:56:                               Benadryl)                                                           

 

     Morphine      -0      No                       4 mg, 1           [facility]

a



               5-03                               mL, Route:           l



               22:56:                               IVP, Drug                                            form:           



                                                  SOLN,           



                                                  ONCE,           



                                                  Dosing           



                                                  Weight           



                                                  127.273,           



                                                  kg,            



                                                  Priority:           



                                                  STAT,           



                                                  Start           



                                                  date:           



                                                  18           



                                                  17:56:00           



                                                  CDT, Stop           



                                                  date:           



                                                  18           



                                                  17:56:00           



                                                  CDT            

 

     OXYCODONE      2017      Yes                      Take by           [facility]

[facility]



     HCL/ACETAMI      2-20                               mouth.           [facility] of



     NOPHEN      10:03:                                              [place]



     (PERCOCET      17                                                Medical



     [facility])                                                        [place]

 

     OXYCODONE      2017      Yes                      Take by           [facility]

rs



     HCL/ACETAMI      2-20                               mouth.           ity of



     NOPHEN      04:03:                                              [place]



     (PERCOCET      17                                                Medical



     [facility])                                                        [facility]

 

     OXYCODONE      2017      Yes                      Take by           [facility]

rs



     HCL/ACETAMI      2-20                               mouth.           ity of



     NOPHEN      04:03:                                              [place]



     (PERCOCET      17                                                Medical



     [facility])                                                        Branch

 

     OXYCODONE      2017      Yes                      Take by           [facility]

rs



     HCL/ACETAMI      2-20                               mouth.           ity of



     NOPHEN      04:03:                                              [place]



     (PERCOCET      17                                                Medical



     [facility])                                                        [place]

 

     OXYCODONE      2017      Yes                      Take by           [facility]

rs



     HCL/ACETAMI      2-20                               mouth.           ity of



     NOPHEN      04:03:                                              [place]



     (PERCOCET      17                                                Medical



     [facility])                                                        Branch

 

     dicyclomine      2017      Yes            20mg      Take 1           [facility]

ers



     (BENTYL) 20      2-20                               tablet by           [facility]

 of



     mg tablet      00:00:                               mouth 4           [place]



               00                                 (four)           Medical



                                                  times           Branch



                                                  daily.           

 

     proMETHazin      2017      Yes            25mg      Take 1           [facility]

ers



     e 25 mg      2-20                               tablet by           [facility] of



     tablet      00:00:                               mouth           [place]



               00                                 every 6           Medical



                                                  (six)           Branch



                                                  hours as           



                                                  needed for           



                                                  Nausea and           



                                                  Vomiting           



                                                  (N/V).           

 

     proMETHazin      2017      Yes            25mg      Take 1           [facility]

ers



     e 25 mg      2-20                               tablet by           ity of



     tablet      00:00:                               mouth           [place]



               00                                 every 6           Medical



                                                  (six)           Branch



                                                  hours as           



                                                  needed for           



                                                  Nausea and           



                                                  Vomiting           



                                                  (N/V).           

 

     proMETHazin      2017      Yes            25mg      Take 1           [facility]

ers



     e 25 mg      2-20                               tablet by           ity of



     tablet      00:00:                               mouth           [place]



               00                                 every 6           Medical



                                                  (six)           Branch



                                                  hours as           



                                                  needed for           



                                                  Nausea and           



                                                  Vomiting           



                                                  (N/V).           

 

     dicyclomine      2017      Yes            20mg      Take 1           [facility]

ers



     (BENTYL) 20      2-20                               tablet by           ity

 of



     mg tablet      00:00:                               mouth 4           [place]



               00                                 (four)           Medical



                                                  times           Branch



                                                  daily.           

 

     proMETHazin      2017      Yes            25mg      Take 1           [facility]

ers



     e 25 mg      2-20                               tablet by           ity of



     tablet      00:00:                               mouth           [place]



               00                                 every 6           Medical



                                                  (six)           Branch



                                                  hours as           



                                                  needed for           



                                                  Nausea and           



                                                  Vomiting           



                                                  (N/V).           

 

     dicyclomine      -      Yes            20mg      Take 1           [facility]

ers



     (BENTYL) 20      2-20                               tablet by           ity

 of



     mg tablet      00:00:                               mouth 4           [place]



               00                                 (four)           Medical



                                                  times           Branch



                                                  daily.           

 

     proMETHazin      -      Yes            25mg      Take 1           [facility]

ers



     e 25 mg      2-20                               tablet by           ity of



     tablet      00:00:                               mouth           [place]



               00                                 every 6           Medical



                                                  (six)           Branch



                                                  hours as           



                                                  needed for           



                                                  Nausea and           



                                                  Vomiting           



                                                  (N/V).           

 

     proMETHazin      2017- No             25mg      Take 1           [facility]

vers



     e 25 mg      2-20 01-25                          tablet by           ity of



     tablet      00:00: 00:00                          mouth           [place]



               00   :00                           every 6           Medical



                                                  (six)           [facility]



                                                  hours as           



                                                  needed for           



                                                  Nausea and           



                                                  Vomiting           



                                                  (N/V).           

 

     dicyclomine      2017- No             20mg      Take 1           [facility]

vers



     (BENTYL) 20      2-20 01-15                          tablet by           it

y of



     mg tablet      00:00: 00:00                          mouth 4           [place]

s



               00   :00                           (four)           Medical



                                                  times           [facility]



                                                  daily.           

 

     proMETHazin      2017-0      Yes            25mg      Take 1           Univ

ers



     e 25 mg      1-04                               tablet by           ity of



     tablet      00:00:                               mouth           [place]



               00                                 every 6           Medical



                                                  (six)           Branch



                                                  hours as           



                                                  needed for           



                                                  Nausea and           



                                                  Vomiting           



                                                  (N/V) for           



                                                  up to 12           



                                                  doses.           

 

     proMETHazin      2017-0      Yes            25mg      Take 1           Un[facility]

ers



     e 25 mg      1-04                               tablet by           ity of



     tablet      00:00:                               mouth           [place]



               00                                 every 6           Medical



                                                  (six)           Branch



                                                  hours as           



                                                  needed for           



                                                  Nausea and           



                                                  Vomiting           



                                                  (N/V) for           



                                                  up to 12           



                                                  doses.           

 

     proMETHazin      2017-0      Yes            25mg      Take 1           Un[facility]

ers



     e 25 mg      1-04                               tablet by           ity of



     tablet      00:00:                               mouth           [place]



               00                                 every 6           Medical



                                                  (six)           Branch



                                                  hours as           



                                                  needed for           



                                                  Nausea and           



                                                  Vomiting           



                                                  (N/V) for           



                                                  up to 12           



                                                  doses.           

 

     proMETHazin      2017-0      Yes            25mg      Take 1           Univ

ers



     e 25 mg      1-04                               tablet by           ity of



     tablet      00:00:                               mouth           [place]



               00                                 every 6           Medical



                                                  (six)           Branch



                                                  hours as           



                                                  needed for           



                                                  Nausea and           



                                                  Vomiting           



                                                  (N/V) for           



                                                  up to 12           



                                                  doses.           

 

     proMETHazin      2017-0      Yes            25mg      Take 1           Un[facility]

ers



     e 25 mg      1-04                               tablet by           ity of



     tablet      00:00:                               mouth           [place]



               00                                 every 6           Medical



                                                  (six)           Branch



                                                  hours as           



                                                  needed for           



                                                  Nausea and           



                                                  Vomiting           



                                                  (N/V) for           



                                                  up to 12           



                                                  doses.           

 

     proMETHazin      2017-0      Yes            25mg      Take 1           Un[facility]

ers



     e 25 mg      1-04                               tablet by           ity of



     tablet      00:00:                               mouth           [place]



               00                                 every 6           Medical



                                                  (six)           Branch



                                                  hours as           



                                                  needed for           



                                                  Nausea and           



                                                  Vomiting           



                                                  (N/V) for           



                                                  up to 12           



                                                  doses.           

 

     proMETHazin      -      Yes            25mg      Take 1           Univ

ers



     e 25 mg      1-04                               tablet by           ity of



     tablet      00:00:                               mouth           [place]



               00                                 every 6           Medical



                                                  (six)           Branch



                                                  hours as           



                                                  needed for           



                                                  Nausea and           



                                                  Vomiting           



                                                  (N/V) for           



                                                  up to 12           



                                                  doses.           

 

     proMETHazin      2017-0      Yes            25mg      Take 1           Un[facility]

ers



     e 25 mg      1-04                               tablet by           ity of



     tablet      00:00:                               mouth           [place]



               00                                 every 6           Medical



                                                  (six)           Branch



                                                  hours as           



                                                  needed for           



                                                  Nausea and           



                                                  Vomiting           



                                                  (N/V) for           



                                                  up to 12           



                                                  doses.           

 

     proMETHazin      2017-0      Yes            25mg      Take 1           Univ

ers



     e 25 mg      1-04                               tablet by           ity of



     tablet      00:00:                               mouth           [place]



               00                                 every 6           Medical



                                                  (six)           Branch



                                                  hours as           



                                                  needed for           



                                                  Nausea and           



                                                  Vomiting           



                                                  (N/V) for           



                                                  up to 12           



                                                  doses.           

 

     proMETHazin      -2022- No             25mg      Take 1           Uni

vers



     e 25 mg      1-04 05-11                          tablet by           ity of



     tablet      00:00: 00:00                          mouth           [place]



               00   :00                           every 6           Medical



                                                  (six)           Branch



                                                  hours as           



                                                  needed for           



                                                  Nausea and           



                                                  Vomiting           



                                                  (N/V) for           



                                                  up to 12           



                                                  doses.           

 

     SEROquel 25 SEROquel 25           No             1{table      SEROquel     

      



     MG   MG                            t}        25 MG           

 

     Macrobid Macrobid           No             1{capsu BID  Macrobid           



     100  MG                          le_with      100 MG           



                                        _food}                     

 

     Pantoprazol Pantoprazol           No             1{table QD   Pantoprazo   

        



     e Sodium 40 e Sodium 40                          t}        le Sodium       

    



     MG   MG                                      40 MG           

 

     Tylenol Tylenol           No             1{table QID  Tylenol           



     with with                          t_as_ne      with           



     Codeine #4 Codeine #4                          eded}      Codeine #4       

    



     300-60 -60 MG                                    300-60 MG           

 

     Collagenase Collagenase           No             1{appli QD   Collagenas   

        



     250 UNIT/ UNIT/GM                          cation_      e 250        

   



                                        to_affe      UNIT/GM           



                                        cted_ar                     



                                        ea}                      

 

     SEROquel 25 SEROquel 25           No             1{table      SEROquel     

      



     MG   MG                            t}        25 MG           

 

     Macrobid Macrobid           No             1{capsu BID  Macrobid           



     100  MG                          le_with      100 MG           



                                        _food}                     

 

     Tylenol Tylenol           Yes  [facility] [place]                1 tablet           [facility]

mmon



     with with                                    as needed           [facility]



     Codeine #4 Codeine #4                                                   - C

HI



                                                                 [facility]

 

     Macrobid Macrobid           Yes  [facility] [place]                1 capsule          

 Common



                                                  with food           [facility]

 

     Pantoprazol Pantoprazol           Yes  [facility] [place]                1 tablet     

      Common



     e Sodium e Sodium                                                   [facility]



                                                                 [facility]

 

     Collagenase Collagenase           Yes  [facility] [place]                1            

  Common



                                                  applicatio           [facility]



                                                  n to           - [facility]



                                                  affected           [facility]

 

     Pantoprazol Pantoprazol           No             1{table QD   Pantoprazo   

        



     e Sodium 40 e Sodium 40                          t}        le Sodium       

    



     MG   MG                                      40 MG           

 

     Tylenol Tylenol           No             1{table QID  Tylenol           



     with with                          t_as_ne      with           



     Codeine #4 Codeine #4                          eded}      Codeine #4       

    



     300-60 -60 MG                                    300-60 MG           

 

     Collagenase Collagenase           No             1{appli QD   [facility]   

        



     250 UNIT/ UNIT/GM                          cation_      e 250        

   



                                        to_affe      UNIT/GM           



                                        cted_ar                     



                                        ea}                      

 

     Tylenol Tylenol           No             1{table QID  Tylenol           



     with with                          t_as_ne      with           



     Codeine #4 Codeine #4                          eded}      Codeine #4       

    



     300-60 -60 MG                                    300-60 MG           

 

     Pantoprazol Pantoprazol           No             1{table QD   Pantoprazo   

        



     e Sodium 40 e Sodium 40                          t}        le Sodium       

    



     MG   MG                                      40 MG           

 

     SEROquel 25 SEROquel 25           No             1{table      SEROquel     

      



     MG   MG                            t}        25 MG           

 

     Collagenase Collagenase           No             1{appli QD   [facility]   

        



     250 UNIT/ UNIT/GM                          cation_      e 250        

   



                                        to_affe      UNIT/GM           



                                        cted_ar                     



                                        ea}                      

 

     Macrobid Macrobid           No             1{capsu BID  Macrobid           



     100  MG                          le_with      100 MG           



                                        _food}                     

 

     Tylenol Tylenol           No             1{table QID                 



     with with                          t_as_ne                     



     Codeine #4 Codeine #4                          eded}                     



     300-60 -60 MG                                                   

 

     Pantoprazol Pantoprazol           No             1{table QD                

  



     e Sodium 40 e Sodium 40                          t}                       



     MG   MG                                                     

 

     SEROquel 25 SEROquel 25           No             1{table                   

  



     MG   MG                            t}                       

 

     Collagenase Collagenase           No             1{appli QD                

  



     250 UNIT/ UNIT/GM                          cation_                   

  



                                        to_affe                     



                                        cted_ar                     



                                        ea}                      

 

     Macrobid Macrobid           No             1{capsu BID                 



     100  MG                          le_with                     



                                        _food}                     

 

     SEROquel 25 SEROquel 25           No             1{table      SEROquel     

      



     MG   MG                            t}        25 MG           

 

     Macrobid Macrobid           No             1{capsu BID  Macrobid           



     100  MG                          le_with      100 MG           



                                        _food}                     

 

     Collagenase Collagenase           No             1{appli QD   [facility]   

        



     250 UNIT/ UNIT/GM                          cation_      e 250        

   



                                        to_affe      UNIT/GM           



                                        cted_ar                     



                                        ea}                      

 

     Pantoprazol Pantoprazol           No             1{table QD   Pantoprazo   

        



     e Sodium 40 e Sodium 40                          t}        le Sodium       

    



     MG   MG                                      40 MG           

 

     Tylenol Tylenol           No             1{table QID  Tylenol           



     with with                          t_as_ne      with           



     Codeine #4 Codeine #4                          eded}      Codeine #4       

    



     300-60 -60 MG                                    300-60 MG           

 

     Tylenol Tylenol           No             1{table QID  Tylenol           



     with with                          t_as_ne      with           



     Codeine #4 Codeine #4                          eded}      Codeine #4       

    



     300-60 -60 MG                                    300-60 MG           

 

     Collagenase Collagenase           No             1{appli QD   [facility]   

        



     250 UNIT/ UNIT/GM                          cation_      e 250        

   



                                        to_affe      UNIT/GM           



                                        cted_ar                     



                                        ea}                      

 

     Pantoprazol Pantoprazol           No             1{table QD   Pantoprazo   

        



     e Sodium 40 e Sodium 40                          t}        le Sodium       

    



     MG   MG                                      40 MG           

 

     Macrobid Macrobid           No             1{capsu BID  Macrobid           



     100  MG                          le_with      100 MG           



                                        _food}                     

 

     SEROquel 25 SEROquel 25           No             1{table      SEROquel     

      



     MG   MG                            t}        25 MG           

 

     Tylenol Tylenol           No             1{table QID  Tylenol           



     with with                          t_as_ne      with           



     Codeine #4 Codeine #4                          eded}      Codeine #4       

    



     300-60 -60 MG                                    300-60 MG           

 

     Collagenase Collagenase           No             1{appli QD   [facility]   

        



     250 UNIT/ UNIT/GM                          cation_      e 250        

   



                                        to_affe      UNIT/GM           



                                        cted_ar                     



                                        ea}                      

 

     Pantoprazol Pantoprazol           No             1{table QD   Pantoprazo   

        



     e Sodium 40 e Sodium 40                          t}        le Sodium       

    



     MG   MG                                      40 MG           

 

     Macrobid Macrobid           No             1{capsu BID  Macrobid           



     100  MG                          le_with      100 MG           



                                        _food}                     

 

     SEROquel 25 SEROquel 25           No             1{table      SEROquel     

      



     MG   MG                            t}        25 MG           







Immunizations







           Ordered    Filled Immunization Date       Status     Comments   Sourc

e



           Immunization Name Name                                        

 

           SARS-COV-2 COVID-19            2021 Completed             Un[facility]

rsity of



           MODERNA, [id number],            00:00:00                         [place] 

[facility]



           0.25ML VACCINE                                             Branch

 

           SARS-COV-2 COVID-19            2020 Completed             Unive

rsity of



           MODERNA 12+ YRS            00:00:00                         [place] [facility]

ical



           VACCINE                                                Branch

 

           SARS-COV-2 COVID-19            2020 Completed             Unive

rsity of



           MODERNA 12+ YRS            00:00:00                         [place] [facility]

ical



           VACCINE                                                Branch







Vital Signs







             Vital Name   Observation Time Observation Value Comments     Source

 

             Systolic blood 2022 18:00:00 136 mm[Hg]                [facility]

sity of



             pressure                                            [facility]

 

             Diastolic blood 2022 18:00:00 92 mm[Hg]                 Unive

rsity of



             pressure                                            [facility]

 

             Heart rate   2022 18:00:00 99 /min                   [facility]

 

             Body temperature 2022 18:00:00 35.83 Tiffanie                 Univ

ersity [facility]

 

             Respiratory rate 2022 18:00:00 18 /min                   [facility]

 

             Oxygen saturation in 2022 18:00:00 95 /min                   

[facility]



             Arterial blood by                                        [place] [facility]

sarah



             Pulse oximetry                                        Branch

 

             Body weight  2022 10:45:00 135.988 kg                [facility]

 

             BMI          2022 10:45:00 60.55 kg/m2               [facility]

 

             Body height  2022 02:02:00 149.9 cm                  [facility]

 

             Systolic blood 2022 01:11:00 166 mm[Hg]                Univer

sity of



             pressure                                            [facility]

 

             Diastolic blood 2022 01:11:00 93 mm[Hg]                 Unive

rsity of



             pressure                                            [facility]

 

             Heart rate   2022 01:09:00 106 /min                  [facility]

 

             Body temperature 2022 01:09:00 36.89 Tiffanie                 Univ

ersity of



                                                                 [facility]

 

             Respiratory rate 2022 01:09:00 20 /min                   Univ

ersity of



                                                                 [place] [facility]



                                                                 Branch

 

             Body weight  2022 01:09:00 127.007 kg                Univers[facility]

ty of



                                                                 [place] [facility]



                                                                 Branch

 

             BMI          2022 01:09:00 56.55 kg/m2               Universi

ty of



                                                                 [facility]



                                                                 [facility]

 

             Oxygen saturation in 2022 01:09:00 96 /min                   

[facility]



             Arterial blood by                                        [facility]

[facility]



             Pulse oximetry                                        [facility]

 

             Systolic blood 2022-10-30 03:00:00 138 mm[Hg]                [facility]

[facility]



             pressure                                            [facility]



                                                                 [facility]

 

             Diastolic blood 2022-10-30 03:00:00 82 mm[Hg]                 [facility]

[facility]



             pressure                                            [facility]



                                                                 [facility]

 

             Heart rate   2022-10-30 03:00:00 102 /min                  [facility]

[facility]



                                                                 [facility]



                                                                 [facility]

 

             Respiratory rate 2022-10-30 03:00:00 20 /min                   [facility]

ers[facility]



                                                                 [facility]



                                                                 [facility]

 

             Oxygen saturation in 2022-10-30 03:00:00 97 /min                   

[facility]



             Arterial blood by                                        [facility]

[facility]



             Pulse oximetry                                        [facility]

 

             Body temperature 2022-10-30 00:13:00 36.5 Tiffanie                  [facility]

[facility]



                                                                 [facility]



                                                                 [facility]

 

             Body weight  2022-10-30 00:13:00 132.904 kg                [facility]

[facility]



                                                                 [facility]



                                                                 [facility]

 

             BMI          2022-10-30 00:13:00 59.18 kg/m2               [facility]

[facility]



                                                                 [facility]



                                                                 [facility]

 

             Systolic blood 2022-10-23 12:42:00 128 mm[Hg]                [facility]

[facility] of



             pressure                                            [facility]



                                                                 [facility]

 

             Diastolic blood 2022-10-23 12:42:00 83 mm[Hg]                 [facility]

[facility]



             pressure                                            [facility]



                                                                 [facility]

 

             Heart rate   2022-10-23 12:42:00 111 /min                  [facility]

[facility]



                                                                 [facility]



                                                                 [facility]

 

             Body temperature 2022-10-23 12:42:00 35.94 Tiffanie                 [facility]

[facility]



                                                                 [facility]



                                                                 [facility]

 

             Respiratory rate 2022-10-23 12:42:00 18 /min                   [facility]

[facility]



                                                                 [facility]



                                                                 [facility]

 

             Oxygen saturation in 2022-10-23 12:42:00 95 /min                   

[facility]



             Arterial blood by                                        [facility]

[facility]



             Pulse oximetry                                        [facility]

 

             Body weight  2022-10-23 10:22:00 132.995 kg                [facility]

[facility]



                                                                 [facility]



                                                                 [facility]

 

             BMI          2022-10-23 10:22:00 59.22 kg/m2               [facility]

[facility]



                                                                 [facility]



                                                                 [facility]

 

             Body height  2022-10-21 11:00:00 149.9 cm                  [facility]

[facility]



                                                                 [facility]



                                                                 [facility]

 

             Systolic blood 2022 17:14:00 130 mm[Hg]                [facility]

[facility]



             pressure                                            [facility]



                                                                 [facility]

 

             Diastolic blood 2022 17:14:00 83 mm[Hg]                 [facility]

[facility]



             pressure                                            [facility]



                                                                 [facility]

 

             Heart rate   2022 17:14:00 100 /min                  [facility]

[facility]



                                                                 [facility]



                                                                 [facility]

 

             Body temperature 2022 17:14:00 36.72 Tiffanie                 [facility]

[facility]



                                                                 [facility]



                                                                 [facility]

 

             Respiratory rate 2022 17:14:00 20 /min                   [facility]

[facility]



                                                                 [facility]



                                                                 [facility]

 

             Oxygen saturation in 2022 17:14:00 96 /min                   

[facility]



             Arterial blood by                                        [facility]

[facility]



             Pulse oximetry                                        [facility]

 

             Body height  2022 10:00:00 149.9 cm                  [facility]

[facility]



                                                                 [facility]



                                                                 [facility]

 

             Body weight  2022 10:00:00 123 kg                    [facility]

[facility]



                                                                 [facility]



                                                                 [facility]

 

             BMI          2022 10:00:00 54.77 kg/m2               [facility]

[facility]



                                                                 [facility]



                                                                 [facility]

 

             Systolic blood 2022 12:40:00 107 mm[Hg]                [facility]

[facility] of



             pressure                                            [facility]



                                                                 [facility]

 

             Diastolic blood 2022 12:40:00 64 mm[Hg]                 [facility]

[facility]



             pressure                                            [facility]



                                                                 [facility]

 

             Heart rate   2022 12:40:00 99 /min                   [facility]

[facility]



                                                                 [facility]



                                                                 [facility]

 

             Body temperature 2022 12:40:00 36.83 Tiffanie                 [facility]

[facility]



                                                                 [facility]



                                                                 [facility]

 

             Respiratory rate 2022 12:40:00 18 /min                   [facility]

[facility]



                                                                 [facility]



                                                                 [facility]

 

             Oxygen saturation in 2022 12:40:00 95 /min                   

[facility]



             Arterial blood by                                        [facility]

[facility]



             Pulse oximetry                                        [facility]

 

             Body height  2022 10:00:00 149.9 cm                  [facility]

[facility]



                                                                 [facility]



                                                                 [facility]

 

             Body weight  2022 10:00:00 123 kg                    [facility]

[facility]



                                                                 [facility]



                                                                 [facility]

 

             BMI          2022 10:00:00 54.77 kg/m2               [facility]

[facility]



                                                                 [facility]



                                                                 [facility]

 

             Heart rate   2022 23:00:00 91 /min                   [facility]

[facility]



                                                                 [facility]



                                                                 [facility]

 

             Oxygen saturation in 2022 23:00:00 95 /min                   

[facility]



             Arterial blood by                                        [facility]

[facility]



             Pulse oximetry                                        [facility]

 

             Systolic blood 2022 22:02:00 134 mm[Hg]                [facility]

[facility]



             pressure                                            [facility]



                                                                 [facility]

 

             Diastolic blood 2022 22:02:00 83 mm[Hg]                 [facility]

[facility]



             pressure                                            [facility]



                                                                 [facility]

 

             Body temperature 2022 21:00:00 36.83 Tiffanie                 [facility]

[facility]



                                                                 [facility]



                                                                 [facility]

 

             Respiratory rate 2022 21:00:00 28 /min                   [facility]

[facility]



                                                                 [facility]



                                                                 [facility]

 

             Body weight  2022 09:00:00 134.99 kg                 [facility]

[facility]



                                                                 [facility]



                                                                 [facility]

 

             BMI          2022 09:00:00 60.08 kg/m2               [facility]

[facility]



                                                                 [facility]



                                                                 [facility]

 

             Body height  2022 22:22:00 149.9 cm                  [facility]

[facility]



                                                                 [facility]



                                                                 [facility]

 

             Systolic blood 2022 03:38:00 126 mm[Hg]                [facility]

[facility]



             pressure                                            [facility]



                                                                 [facility]

 

             Diastolic blood 2022 03:38:00 90 mm[Hg]                 [facility]

[facility]



             pressure                                            [facility]



                                                                 [facility]

 

             Heart rate   2022 03:38:00 97 /min                   [facility]

[facility]



                                                                 [facility]



                                                                 [facility]

 

             Respiratory rate 2022 03:38:00 18 /min                   [facility]

[facility]



                                                                 [facility]



                                                                 [facility]

 

             Oxygen saturation in 2022 03:38:00 97 /min                   

[facility]



             Arterial blood by                                        [facility]

[facility]



             Pulse oximetry                                        [facility]

 

             Body temperature 2022-07-15 22:09:00 36.94 Tiffanie                 [facility]

[facility]



                                                                 [facility]



                                                                 [facility]

 

             Body height  2022-07-15 22:09:00 149.9 cm                  [facility]

[facility]



                                                                 [facility]



                                                                 [facility]

 

             Body weight  2022-07-15 22:09:00 127.007 kg                [facility]

[facility]



                                                                 [facility]



                                                                 [facility]

 

             BMI          2022-07-15 22:09:00 56.55 kg/m2               [facility]

[facility]



                                                                 [facility]



                                                                 [facility]

 

             Systolic blood 2022 11:00:00 143 mm[Hg]                [facility]

[facility]



             pressure                                            [facility]



                                                                 [facility]

 

             Diastolic blood 2022 11:00:00 91 mm[Hg]                 [facility]

[facility]



             pressure                                            [facility]



                                                                 [facility]

 

             Heart rate   2022 11:00:00 100 /min                  [facility]

[facility]



                                                                 [facility]



                                                                 [facility]

 

             Respiratory rate 2022 11:00:00 16 /min                   [facility]

[facility]



                                                                 [facility]



                                                                 [facility]

 

             Oxygen saturation in 2022 11:00:00 96 /min                   

[facility]



             Arterial blood by                                        [facility]

[facility]



             Pulse oximetry                                        [facility]

 

             Body temperature 2022 09:55:00 36.89 Tiffanie                 [facility]

[facility]



                                                                 [facility]



                                                                 [facility]

 

             Body height  2022 09:55:00 149.9 cm                  [facility]

[facility]



                                                                 [facility]



                                                                 [facility]

 

             Body weight  2022 09:55:00 127.007 kg                [facility]

[facility]



                                                                 [facility]



                                                                 [facility]

 

             BMI          2022 09:55:00 56.55 kg/m2               [facility]

[facility]



                                                                 [facility]



                                                                 [facility]

 

             Systolic blood 2022 00:43:00 132 mm[Hg]                [facility]

[facility] of



             pressure                                            [facility]



                                                                 [facility]

 

             Diastolic blood 2022 00:43:00 88 mm[Hg]                 [facility]

[facility]



             pressure                                            [facility]



                                                                 [facility]

 

             Heart rate   2022 00:43:00 107 /min                  [facility]

[facility]



                                                                 [facility]



                                                                 [facility]

 

             Body temperature 2022 00:43:00 36.33 Tiffanie                 [facility]

[facility]



                                                                 [facility]



                                                                 [facility]

 

             Respiratory rate 2022 00:43:00 18 /min                   [facility]

[facility]



                                                                 [facility]



                                                                 [facility]

 

             Oxygen saturation in 2022 00:43:00 95 /min                   

[facility]



             Arterial blood by                                        [facility]

[facility]



             Pulse oximetry                                        [facility]

 

             Body height  2022 11:31:00 149.9 cm                  [facility]

[facility]



                                                                 [facility]



                                                                 [facility]

 

             Body weight  2022 11:31:00 127.007 kg                [facility]

[facility]



                                                                 [facility]



                                                                 [facility]

 

             BMI          2022 11:31:00 56.55 kg/m2               [facility]

[facility]



                                                                 [facility]



                                                                 [facility]

 

             Body temperature 2022 16:10:00 36.22 Tiffanie                 [facility]

[facility]



                                                                 [facility]



                                                                 [facility]

 

             Respiratory rate 2022 16:10:00 16 /min                   [facility]

[facility] of



                                                                 [facility]



                                                                 [facility]

 

             Oxygen saturation in 2022 16:10:00 96 /min                   

[facility]



             Arterial blood by                                        [facility]

[facility]



             Pulse oximetry                                        [facility]

 

             Systolic blood 2022 16:10:00 127 mm[Hg]                [facility]

[facility] of



             pressure                                            [facility]



                                                                 [facility]

 

             Diastolic blood 2022 16:10:00 84 mm[Hg]                 [facility]

[facility]



             pressure                                            [facility]



                                                                 [facility]

 

             Heart rate   2022 16:10:00 98 /min                   [facility]

[facility]



                                                                 [facility]



                                                                 [facility]

 

             Body weight  2022 08:54:00 133.494 kg                [facility]

[facility]



                                                                 [facility]



                                                                 [facility]

 

             BMI          2022 08:54:00 50.52 kg/m2               [facility]

[facility]



                                                                 [facility]



                                                                 [facility]

 

             Body height  2022 03:01:00 162.6 cm                  [facility]

[facility]



                                                                 [facility]



                                                                 [facility]

 

             Systolic blood 2022-06-10 17:03:00 132 mm[Hg]                [facility]

[facility] of



             pressure                                            [facility]



                                                                 [facility]

 

             Diastolic blood 2022-06-10 17:03:00 90 mm[Hg]                 [facility]

[facility] of



             pressure                                            [facility]



                                                                 [facility]

 

             Heart rate   2022-06-10 17:03:00 78 /min                   [facility]

[facility]



                                                                 [facility]



                                                                 [facility]

 

             Body temperature 2022-06-10 17:03:00 35.83 Tiffanie                 [facility]

[facility]



                                                                 [facility]



                                                                 [facility]

 

             Respiratory rate 2022-06-10 17:03:00 14 /min                   [facility]

[facility] of



                                                                 [facility]



                                                                 [facility]

 

             Oxygen saturation in 2022-06-10 17:03:00 93 /min                   

[facility]



             Arterial blood by                                        [facility]

[facility]



             Pulse oximetry                                        [facility]

 

             Body weight  2022-06-10 09:00:00 140.933 kg                [facility]

[facility]



                                                                 [facility]



                                                                 [facility]

 

             BMI          2022-06-10 09:00:00 62.75 kg/m2               [facility]

[facility]



                                                                 [facility]



                                                                 [facility]

 

             Body height  2022 12:47:00 149.9 cm                  [facility]

[facility]



                                                                 [facility]



                                                                 [facility]

 

             Systolic blood 2022 20:40:00 125 mm[Hg]                [facility]

[facility] of



             pressure                                            [facility]



                                                                 [facility]

 

             Diastolic blood 2022 20:40:00 75 mm[Hg]                 [facility]

[facility] of



             pressure                                            [facility]



                                                                 [facility]

 

             Heart rate   2022 20:40:00 99 /min                   [facility]

[facility]



                                                                 [facility]



                                                                 [facility]

 

             Body temperature 2022 20:40:00 36.67 Tiffanie                 [facility]

[facility]



                                                                 [facility]



                                                                 [facility]

 

             Respiratory rate 2022 20:40:00 20 /min                   [facility]

[facility]



                                                                 [facility]



                                                                 [facility]

 

             Oxygen saturation in 2022 20:40:00 93 /min                   

[facility]



             Arterial blood by                                        [facility]

[facility]



             Pulse oximetry                                        [facility]

 

             Body weight  2022 22:13:00 137.485 kg                [facility]

[facility]



                                                                 [facility]



                                                                 [facility]

 

             BMI          2022 22:13:00 61.22 kg/m2               [facility]

ty of



                                                                 [facility]



                                                                 [facility]

 

             Body height  2022 09:10:00 149.9 cm                  [facility]

ty of



                                                                 [facility]



                                                                 [facility]

 

             Systolic blood 2022 15:49:00 111 mm[Hg]                Univer

sity of



             pressure                                            [facility]



                                                                 [facility]

 

             Diastolic blood 2022 15:49:00 76 mm[Hg]                 Unive

rsity of



             pressure                                            [facility]



                                                                 [facility]

 

             Heart rate   2022 15:49:00 109 /min                  Univers[facility]

ty of



                                                                 [facility]



                                                                 [facility]

 

             Body temperature 2022 15:49:00 36.06 Tiffanie                 Univ

ersity of



                                                                 [facility]



                                                                 [facility]

 

             Respiratory rate 2022 15:49:00 18 /min                   Univ

ersity of



                                                                 [facility]



                                                                 [facility]

 

             Oxygen saturation in 2022 15:49:00 92 /min                   

[facility]



             Arterial blood by                                        [facility]

[facility]



             Pulse oximetry                                        [facility]anch

 

             Body weight  2022 10:47:00 139.481 kg                [facility]

ty of



                                                                 [facility]



                                                                 [facility]

 

             BMI          2022 10:47:00 62.11 kg/m2               Universi

ty of



                                                                 [facility]



                                                                 [facility]

 

             Body height  2022 06:57:00 149.9 cm                  [facility]

ty of



                                                                 [facility]



                                                                 [facility]

 

             Systolic blood 2022 06:00:00 144 mm[Hg]                Univer

sity of



             pressure                                            [facility]



                                                                 [facility]

 

             Diastolic blood 2022 06:00:00 93 mm[Hg]                 Unive

rsity of



             pressure                                            [facility]



                                                                 [facility]

 

             Heart rate   2022 06:00:00 92 /min                   [facility]

ty of



                                                                 [facility]



                                                                 [facility]

 

             Respiratory rate 2022 06:00:00 22 /min                   Univ

ersity of



                                                                 [facility]



                                                                 [facility]

 

             Oxygen saturation in 2022 04:00:00 94 /min                   

[facility]



             Arterial blood by                                        [facility]

[facility]



             Pulse oximetry                                        [facility]

 

             Body temperature 2022 03:45:00 37.06 Tiffanie                 Univ

ersity of



                                                                 [facility]



                                                                 [facility]

 

             Body height  2022 03:45:00 149.9 cm                  [facility]

ty of



                                                                 [facility]



                                                                 [facility]

 

             Body weight  2022 03:45:00 127.461 kg                [facility]

ty of



                                                                 [facility]



                                                                 [facility]

 

             BMI          2022 03:45:00 56.76 kg/m2               [facility]i

ty of



                                                                 [facility]



                                                                 [facility]

 

             Systolic blood 2022 03:18:00 113 mm[Hg]                Univer

sity of



             pressure                                            [facility]



                                                                 [facility]

 

             Diastolic blood 2022 03:18:00 85 mm[Hg]                 Unive

rsity of



             pressure                                            [facility]



                                                                 [facility]

 

             Heart rate   2022 03:18:00 96 /min                   [facility]

ty of



                                                                 [facility]



                                                                 [facility]

 

             Respiratory rate 2022 03:18:00 18 /min                   Univ

ersity of



                                                                 [facility]



                                                                 [facility]

 

             Oxygen saturation in 2022 03:18:00 93 /min                   

[facility]



             Arterial blood by                                        [facility]

[facility]



             Pulse oximetry                                        [facility]anch

 

             Body temperature 2022 01:01:16 36.89 Tiffanie                 Univ

ersity of



                                                                 [facility]



                                                                 [facility]

 

             Body weight  2022 23:13:00 127.461 kg                [facility]

ty of



                                                                 [facility]



                                                                 [facility]

 

             BMI          2022 23:13:00 56.76 kg/m2               Univers[facility]

ty of



                                                                 [facility]



                                                                 [facility]

 

             Systolic blood 2022 21:53:00 142 mm[Hg]                Univer

sity of



             pressure                                            [facility]



                                                                 [facility]

 

             Diastolic blood 2022 21:53:00 88 mm[Hg]                 Unive

rsity of



             pressure                                            [facility]



                                                                 [facility]

 

             Heart rate   2022 21:53:00 96 /min                   [facility]

ty of



                                                                 [facility]



                                                                 [facility]

 

             Body temperature 2022 21:53:00 36.06 Tiffanie                 Univ

ersity of



                                                                 [facility]



                                                                 [facility]

 

             Respiratory rate 2022 21:53:00 18 /min                   Univ

ersity of



                                                                 [facility]



                                                                 [facility]

 

             Oxygen saturation in 2022 21:53:00 96 /min                   

[facility]



             Arterial blood by                                        [facility]

[facility]



             Pulse oximetry                                        [facility]

 

             Body weight  2022 09:48:00 138.801 kg                [facility]

ty of



                                                                 [facility]



                                                                 [facility]

 

             BMI          2022 09:48:00 61.80 kg/m2               Univers[facility]

ty of



                                                                 [facility]



                                                                 [facility]

 

             Body height  2022 10:27:00 149.9 cm                  [facility]

ty of



                                                                 [facility]



                                                                 [facility]

 

             Systolic blood 2022 03:50:00 142 mm[Hg]                Univer

sity of



             pressure                                            [facility]



                                                                 [facility]

 

             Diastolic blood 2022 03:50:00 95 mm[Hg]                 Unive

rsity of



             pressure                                            [facility]



                                                                 [facility]

 

             Heart rate   2022 03:50:00 93 /min                   Univers[facility]

ty of



                                                                 [facility]



                                                                 [facility]

 

             Respiratory rate 2022 03:50:00 17 /min                   Univ

ersity of



                                                                 [facility]

 

             Oxygen saturation in 2022 03:50:00 98 /min                   

[facility]



             Arterial blood by                                        [facility]

[facility]



             Pulse oximetry                                        [facility]anch

 

             Body temperature 2022 20:39:00 36.5 Tiffanie                  Univ

ersity of



                                                                 [facility]

 

             Body weight  2022 20:39:00 136.079 kg                Universi

ty of



                                                                 [facility]

 

             BMI          2022 20:39:00 60.59 kg/m2               Universi

ty [facility]

 

             Systolic blood 2022 01:46:00 134 mm[Hg]                Univer

sity of



             pressure                                            [facility]

 

             Diastolic blood 2022 01:46:00 100 mm[Hg]                Unive

rsity of



             [facility]

 

             Heart rate   2022 01:46:00 102 /min                  Universi

ty of



                                                                 [facility]

 

             Respiratory rate 2022 01:46:00 22 /min                   Univ

ersity of



                                                                 [facility]

 

             Oxygen saturation in 2022 01:46:00 96 /min                   

[facility]



             Arterial blood by                                        [facility]

[facility]



             Pulse oximetry                                        [facility]anch

 

             Body temperature 2022-01-15 20:52:00 36.67 Tiffanie                 Univ

ersity of



                                                                 [facility]

 

             Body weight  2022-01-15 20:52:00 136.079 kg                Universi

ty [facility]

 

             BMI          2022-01-15 20:52:00 60.59 kg/m2               Universi

[facility]

 

             height       2021 11:20:00 59 [in_i]                 [facility]

 

             weight       2021 11:20:00 350 [lb_av]               [facility]

 

             temperature  2021 11:20:00 97.8 [degF]               [facility]

 

             bmi          2021 11:20:00 70.68 kg/m2               [facility]

 

             oximetry     2021 11:20:00 94 %                      [facility]

 

             blood pressure 2021 11:20:00 160 mm[Hg]                [facility]

 [facility]



             systolic                                            [facility]

 

             blood pressure 2021 11:20:00 80 mm[Hg]                 [facility]

 [facility]



             diastolic                                           [facility]

 

             Systolic blood 2021 23:30:00 175 mm[Hg]                Univer

sity of



             [facility]

 

             Diastolic blood 2021 23:30:00 107 mm[Hg]                [facility]

 

             Heart rate   2021 23:30:00 81 /min                   [facility]

 

             Respiratory rate 2021 23:30:00 21 /min                   [facility]

 

             Oxygen saturation in 2021 23:30:00 97 /min                   

[facility] blood by                                        [facility]

[facility]



             Pulse oximetry                                        Branch

 

             Body weight  2021 21:55:00 136.079 kg                [facility]

 

             BMI          2021 21:55:00 60.59 kg/m2               [facility]

 

             Body temperature 2021 21:55:00 37.44 Tiffanie                 [facility]

 

             height       2021 13:00:00 59 [in_i]                 [facility]

 

             weight       2021 13:00:00 350 [lb_av]               [facility]

 

             temperature  2021 13:00:00 96.8 [degF]               [facility]

 

             bmi          2021 13:00:00 70.68 kg/m2               [facility]

 

             oximetry     2021 13:00:00 96 %                      [facility]

[facility]

 

             blood pressure 2021 13:00:00 120 mm[Hg]                [facility]

 [facility]



             systolic                                            [facility]

 

             blood pressure 2021 13:00:00 77 mm[Hg]                 [facility]

 [facility]



             diastolic                                           [facility]

 

             height       2021 14:20:00 59 [in_i]                 [facility]

 

             weight       2021 14:20:00 350 [lb_av]               [facility]

 

             temperature  2021 14:20:00 95 [degF]                 [facility]

 

             bmi          2021 14:20:00 70.68 kg/m2               [facility]

 

             oximetry     2021 14:20:00 98 %                      [facility]

 

             blood pressure 2021 14:20:00 128 mm[Hg]                [facility]

 [facility]



             systolic                                            [facility]

 

             blood pressure 2021 14:20:00 82 mm[Hg]                 [facility]

 [facility]



             diastolic                                           [facility]

 

             height       2021 13:40:00 59 [in_i]                 [facility]

 

             weight       2021 13:40:00 350 [lb_av]               [facility]

 

             temperature  2021 13:40:00 97.4 [degF]               [facility]

 

             bmi          2021 13:40:00 70.68 kg/m2               [facility]

 

             oximetry     2021 13:40:00 91 %                      [facility]

 

             blood pressure 2021 13:40:00 132 mm[Hg]                [facility]

 [facility]



             systolic                                            [facility]

 

             blood pressure 2021 13:40:00 87 mm[Hg]                 [facility]

 [facility]



             diastolic                                           [facility]

 

             Systolic blood 2021-05-10 01:30:00 163 mm[Hg]                Univer

sity of



             pressure                                            [facility]

 

             Diastolic blood 2021-05-10 01:30:00 106 mm[Hg]                Unive

rsity of



             pressure                                            [facility]

 

             Heart rate   2021-05-10 01:30:00 97 /min                   [facility]

 

             Respiratory rate 2021-05-10 01:30:00 21 /min                   [facility]

 

             Oxygen saturation in 2021-05-10 01:30:00 97 /min                   

[facility]



             Arterial blood by                                        [facility]

[facility]



             Pulse oximetry                                        Branch

 

             Body temperature 2021 23:27:00 36.83 Tiffanie                 [facility]

 

             Body height  2021 23:27:00 149.9 cm                  [facility]

ty of



                                                                 [facility]



                                                                 [facility]

 

             Body weight  2021 23:27:00 136.079 kg                [facility]

ty of



                                                                 [facility]



                                                                 [facility]

 

             BMI          2021 23:27:00 60.59 kg/m2               Univers[facility]

ty of



                                                                 [facility]



                                                                 [facility]

 

             Systolic blood 2021-05-10 01:30:00 163 mm[Hg]                Univer

sity of



             pressure                                            [facility]



                                                                 [facility]

 

             Diastolic blood 2021-05-10 01:30:00 106 mm[Hg]                Unive

rsity of



             pressure                                            [facility]



                                                                 [facility]

 

             Heart rate   2021-05-10 01:30:00 97 /min                   Universi

ty of



                                                                 [facility]



                                                                 [facility]

 

             Respiratory rate 2021-05-10 01:30:00 21 /min                   Univ

ersity of



                                                                 [facility]



                                                                 [facility]

 

             Oxygen saturation in 2021-05-10 01:30:00 97 /min                   

[facility]



             Arterial blood by                                        [facility]

[facility]



             Pulse oximetry                                        Branch

 

             Body temperature 2021 23:27:00 36.83 Tiffanie                 Univ

ersity of



                                                                 [facility]



                                                                 [facility]

 

             Body height  2021 23:27:00 149.9 cm                  [facility]

ty of



                                                                 [facility]



                                                                 [facility]

 

             Body weight  2021 23:27:00 136.079 kg                Univers[facility]

ty of



                                                                 [facility]



                                                                 [facility]

 

             BMI          2021 23:27:00 60.59 kg/m2               Universi

ty of



                                                                 [facility]



                                                                 [facility]

 

             Systolic blood 2022 16:49:00 127 mm[Hg]                Univer

sity of



             pressure                                            [facility]



                                                                 [facility]

 

             Diastolic blood 2022 16:49:00 86 mm[Hg]                 Unive

rsity of



             pressure                                            [facility]



                                                                 [facility]

 

             Heart rate   2022 16:49:00 101 /min                  Univers[facility]

ty of



                                                                 [facility]



                                                                 [facility]

 

             Body temperature 2022 16:49:00 36.83 Tiffanie                 Univ

ersity of



                                                                 [facility]



                                                                 [facility]

 

             Respiratory rate 2022 16:49:00 20 /min                   Univ

ersity of



                                                                 [facility]



                                                                 [facility]

 

             Oxygen saturation in 2022 16:49:00 95 /min                   

[facility]



             Arterial blood by                                        [facility]

[facility]



             Pulse oximetry                                        Branch

 

             Body height  2022 10:00:00 149.9 cm                  [facility]

ty of



                                                                 [facility]



                                                                 [facility]

 

             Body weight  2022 10:00:00 123 kg                    Univers[facility]

ty of



                                                                 [facility]



                                                                 [facility]

 

             BMI          2022 10:00:00 54.77 kg/m2               [facility]

 

             Temperature Oral (F) 2022 21:12:00 98.0 F                    

[facility]

 

             Heart [facility]   2022 21:12:00                           [facility]

 [place]

 

             Respitory Rate 2022 21:12:00                           [facility] [place]

 

             Systolic (mm Hg) 2022 21:12:00                           [facility] [place]

 

             Diastolic (mm Hg) 2022 21:12:00                           [facility] [place]

 

             Heart Rate   2022 17:09:26                           [facility]

 [place]

 

             Respitory Rate 2022 17:09:26                           [facility] [place]

 

             Temperature Oral (F) 2022 17:09:08 98.2 F                    

[facility]

 

             Systolic (mm Hg) 2022 17:08:50                           [facility] [place]

 

             Diastolic (mm Hg) 2022 17:08:50                           [facility] [place]

 

             Heart Rate   2022 17:08:50                           [facility]

 

             Respitory Rate 2022 13:07:22                           [facility] [place]

 

             Temperature Oral (F) 2022 13:07:04 97.7 F                    

[facility]

 

             Systolic (mm Hg) 2022 13:06:57                           [facility] [place]

 

             Diastolic (mm Hg) 2022 13:06:57                           [facility]

[facility] [place]

 

             Heart Rate   2022 10:00:21                           [facility]

 [place]

 

             Respitory Rate 2022 10:00:21                           [facility]

[facility] [place]

 

             Temperature Oral (F) 2022 09:59:28 97.5 F                    

[facility]

 

             Systolic (mm Hg) 2022 09:58:18                           [facility]

[facility] [place]

 

             Diastolic (mm Hg) 2022 09:58:18                           [facility]

[facility] [place]

 

             Heart Rate   2022 09:58:18                           [facility]

 [place]

 

             Heart Rate   2022 04:58:41                           [facility]

 [place]

 

             Respitory Rate 2022 04:58:41                           [facility]

[facility] [place]

 

             Temperature Oral (F) 2022 04:58:34 97.2 F                    

[facility]

 

             Systolic (mm Hg) 2022 04:57:48                           [facility]

[facility] [place]

 

             Diastolic (mm Hg) 2022 04:57:48                           [facility]

[facility] [place]

 

             Respitory Rate 2022 00:54:28                           [facility]

[facility] [place]

 

             Systolic (mm Hg) 2022 00:54:19                           [facility]

[facility] [place]

 

             Diastolic (mm Hg) 2022 00:54:19                           [facility]

[facility] [place]

 

             Temperature Oral (F) 2022 00:53:24 98.1 F                    

[facility]

 

             Height       2022 23:39:00 149.86 cm                 [facility]

 [place]

 

             Weight       2022 23:39:00                           [facility]

 [place]

 

             BMI Calculated 2022 23:39:00                           [facility]

[facility] [place]

 

             Systolic (mm Hg) 2022 14:00:00                           [facility]

[facility] [place]

 

             Diastolic (mm Hg) 2022 14:00:00                           [facility]

[facility] [place]

 

             Respitory Rate 2022 14:00:00                           [facility]

[facility] [place]

 

             Respitory Rate 2022 13:00:00                           [facility]

[facility] [place]

 

             Systolic (mm Hg) 2022 13:00:00                           [facility]

[facility] [place]

 

             Diastolic (mm Hg) 2022 13:00:00                           [facility]

[facility] [place]

 

             Respitory Rate 2022 12:00:00                           [facility]

[facility] [place]

 

             Systolic (mm Hg) 2022 12:00:00                           [facility]

[facility] [place]

 

             Diastolic (mm Hg) 2022 12:00:00                           [facility]

[facility] [place]

 

             Respitory Rate 2022 05:00:00                           [facility]

[facility] [place]

 

             Systolic (mm Hg) 2022 05:00:00                           [facility]

[facility] [place]

 

             Diastolic (mm Hg) 2022 05:00:00                           [facility]

[facility] [place]

 

             Respitory Rate 2022 04:00:00                           [facility]

[facility] [place]

 

             Systolic (mm Hg) 2022 04:00:00                           [facility]

[facility] [place]

 

             Diastolic (mm Hg) 2022 04:00:00                           [facility]

[facility] [place]

 

             Respitory Rate 2022 03:00:00                           [facility]

[facility] [place]

 

             Systolic (mm Hg) 2022 03:00:00                           [facility]

[facility] [place]

 

             Diastolic (mm Hg) 2022 03:00:00                           [facility]

[facility] [place]

 

             Heart Rate   2022 15:55:41                           [facility]

 [place]

 

             Temperature Oral (F) 2022 15:55:32 98.3 F                    

[facility]

 

             Heart Rate   2022 15:54:37                           [facility]

 [place]

 

             Heart Rate   2022 12:20:12                           [facility]

 [place]

 

             Temperature Oral (F) 2022 12:19:51 97.7 F                    

[facility]

 

             Temperature Oral (F) 2022 09:20:54 97.9 F                    

[facility] [place]

 

             Height       2022 06:18:00 149.86 cm                 [facility]

 [place]

 

             Weight       2022 06:18:00                           [facility]

 [place]

 

             BMI Calculated 2022 06:18:00                           [facility]

[facility] [place]

 

             Respitory Rate 2018 17:30:00                           [facility]

[facility] [place]

 

             Systolic (mm Hg) 2018 17:30:00                           [facility]

[facility] [place]

 

             Diastolic (mm Hg) 2018 17:30:00                           [facility]

[facility] [place]

 

             Temperature Oral (F) 2018 17:30:00 98.4 F                    

[facility]

 

             Temperature Oral (F) 2018 16:23:00 98.6 F                    

[facility]

 

             Systolic (mm Hg) 2018 16:23:00                           [facility]

[facility] [place]

 

             Diastolic (mm Hg) 2018 16:23:00                           [facility]

[facility] [place]

 

             Respitory Rate 2018 14:00:00                           [facility]

[facility] [place]

 

             Systolic (mm Hg) 2018 14:00:00                           [facility]

[facility] [place]

 

             Diastolic (mm Hg) 2018 14:00:00                           [facility]

[facility] [place]

 

             Respitory Rate 2018 13:30:00                           [facility]

[facility] [place]

 

             BMI Calculated 2018 10:16:00                           [facility]

[facility] [place]

 

             Height       2018 10:16:00 149.86 cm                 [facility]

 [place]

 

             Weight       2018 10:16:00                           [facility]

 [place]

 

             Heart Rate   2018 10:16:00                           [facility]

 [place]

 

             Temperature Oral (F) 2018 10:16:00 97.9 F                    

[facility]

 

             Temperature Oral (F) 2018 13:40:00 97.2 F                    

[facility] [place]

 

             Systolic (mm Hg) 2018 13:40:00                           [facility]

[facility] [place]

 

             Diastolic (mm Hg) 2018 13:40:00                           [facility]

[facility] [place]

 

             Heart Rate   2018 13:40:00                           [facility]

 [place]

 

             Respitory Rate 2018 13:40:00                           [facility]

[facility] [place]

 

             Heart Rate   2018 05:24:00                           [facility]

 [place]

 

             Systolic (mm Hg) 2018 05:24:00                           [facility]

[facility] [place]

 

             Diastolic (mm Hg) 2018 05:24:00                           [facility]

[facility] [place]

 

             Respitory Rate 2018 05:24:00                           [facility]

[facility] [place]

 

             Temperature Oral (F) 2018 05:24:00 98.1 F                    

[facility] [place]

 

             Respitory Rate 2018 01:15:00                           [facility]

[facility] [place]

 

             Systolic (mm Hg) 2018 01:15:00                           [facility]

[facility] [place]

 

             Diastolic (mm Hg) 2018 01:15:00                           [facility]

[facility] [place]

 

             Heart Rate   2018 01:15:00                           [facility]

 [place]

 

             Temperature Oral (F) 2018 01:15:00 98.1 F                    

[facility]

 

             Weight       2018 14:00:00                           [facility]

 [place]

 

             Weight       2018 14:00:00                           [facility]

 [place]

 

             Weight       2018 14:00:00                           [facility]

 [place]

 

             BMI Calculated 2018 05:43:00                           [facility]

[facility] [place]

 

             Height       2018 05:43:00 162.56 cm                 [facility]

 [place]

 

             Height       2018 22:41:00 149.86 cm                 [facility]

 [place]

 

             BMI Calculated 2018 22:41:00                           [facility]

[facility] [place]







Procedures







                Procedure       Date / Time     Performing Clinician Source



                                Performed                       

 

                POCT GLUCOSE (AUTOMATED) 2022 22:58:00 Edwardo Lopez

[facility]

 

                POCT GLUCOSE (AUTOMATED) 2022 18:00:00 Edwardo Lopez

[facility]

 

                POCT GLUCOSE (AUTOMATED) 2022 13:46:00 Edwardo Lopez

[facility]

 

                BASIC METABOLIC PANEL 2022 10:49:00 Tabitha Kaylynn  [facility]

[facility] [place]



                (NA, K, CL, CO2, GLUCOSE,                                 Medica

l Branch



                BUN, CREATININE, CA)                                 

 

                CBC WITH DIFF   2022 10:49:00 Tabitha Kaylynn  [place]

f [facility]

 

                LACTIC ACID WHOLE BLOOD 2022 [facility]31[facility]

 

                MRSA / MSSA SCREEN BY 2022 02:31:00 Edwardo Lopez   [facility]



                PCR, [facility], [place]

 

                POCT GLUCOSE (AUTOMATED) 2022 02:09:00 Edwardo Lopez

[facility]

 

                BLOOD CULTURE SCREEN 2022 00:35:00 Neeta Maria [facility]

 

                URINALYSIS      2022 23:26:00 Neeta Maria [facility]

 

                BLOOD CULTURE SCREEN 2022 23:15:00 Neeta Maria [facility]

 

                COMP. METABOLIC PANEL 2022 23:08:00 Neeta Maria [facility]

[facility] [place]



                ([id number])                                         [facility]

 

                CBC WITH DIFF   2022 23:08:00 Neeta Maria [facility]

 

                LACTIC ACID WHOLE BLOOD 2022 23:07:00 eNeta Maria [facility]

 

                CT ABDOMEN PELVIS W 2022-10-30 02:06:00 Ashley Garay [facility]



                CONTRAST                                        [facility]

 

                LIPASE          2022-10-30 00:59:00 Ashley Garay [facility]

 

                COMP. METABOLIC PANEL 2022-10-30 00:59:00 Ashley Garay [facility]



                ([id number])                                         [facility]

 

                CBC WITH DIFF   2022-10-30 00:59:00 Ashley Garay [facility]

 

                URINALYSIS      2022-10-30 00:49:00 Ashley Garay [facility]

 

                POCT GLUCOSE (AUTOMATED) 2022-10-23 13:14:00 Rachel Bailey  [facility]

 

                POCT GLUCOSE (AUTOMATED) 2022-10-23 01:14:00 [facility]  [facility]

 

                POCT GLUCOSE (AUTOMATED) 2022-10-22 21:19:00 Rachel Bailey  [facility]

 

                BASIC METABOLIC PANEL 2022-10-22 11:23:00 Leo [facility]



                (NA, K, CL, CO2, GLUCOSE,                                 Medica

l Branch



                BUN, CREATININE, CA)                                 

 

                CBC WITH DIFF   2022-10-22 11:16:00 Leo [facility] o

f [facility]

 

                MRSA / MSSA SCREEN BY 2022-10-22 01:13:00 Edwardo Lopez   [facility]



                PCR, [facility], [place]

 

                POCT GLUCOSE (AUTOMATED) 2022-10-22 01:11:00 Leo [facility]  [facility]

 

                POCT GLUCOSE (AUTOMATED) 2022-10-21 21:32:00 Leo [facility]

 

                URINE CULTURE   2022-10-21 16:17:00 Leo [facility] o

f [facility]

 

                POCT GLUCOSE (AUTOMATED) 2022-10-21 16:08:00 Leo [facility]

 

                POCT GLUCOSE (AUTOMATED) 2022-10-21 12:43:00 Leo [facility]

 

                ASPIRATE OR ABSCESS 2022-10-21 07:35:00 Davey [facility]

[facility] [place]



                CULTURE(AEROBIC/ANAEROBIC                                 [facility]

[facility]



                )                                               

 

                URINALYSIS      2022-10-21 07:32:00 Davey [facility]

 

                CT ABDOMEN PELVIS W 2022-10-21 07:25:15 Davey [facility]

[facility] [place]



                CONTRAST                                        [facility]

 

                BLOOD CULTURE SCREEN 2022-10-21 06:01:00 Yoni Mariscal [facility]

 

                LIPASE          2022-10-21 06:01:00 Davey [facility]

 

                COMP. METABOLIC PANEL 2022-10-21 06:01:00 Yoni Mariscal [facility]



                ([id number])                                         Medical Branch

 

                CBC WITH DIFF   2022-10-21 06:01:00 Yoni Mariscal [facility]

 

                POCT GLUCOSE (AUTOMATED) 2022 18:34:00 Scooby Sharpe

[facility]

 

                POCT GLUCOSE (AUTOMATED) 2022 14:37:00 Scooby Sharpe

[facility]

 

                BASIC METABOLIC PANEL 2022 [facility]:22:00 [facility]



                (NA, K, CL, CO2, GLUCOSE,                                 Medica

l Branch



                BUN, CREATININE, CA)                                 

 

                CBC WITH DIFF   2022 13:22:00 Finn [facility]

 

                POCT GLUCOSE (AUTOMATED) 2022 10:56:00 Scooby Sharpe

[facility]

 

                POCT GLUCOSE (AUTOMATED) 2022 05:46:00 Scooby Sharpe

[facility]

 

                POCT GLUCOSE (AUTOMATED) 2022 02:17:00 Scooby Sharpe

[facility]

 

                POCT GLUCOSE (AUTOMATED) 2022 23:13:00 Scoboy Sharpe

[facility]

 

                POCT GLUCOSE (AUTOMATED) 2022 18:22:00 Scooby Sharpe

[facility]

 

                POCT GLUCOSE (AUTOMATED) 2022 14:56:00 Scooby Sharpe

[facility]

 

                BASIC METABOLIC PANEL 2022 10:03:00 Matt Briseno [facility]

[facility] [place]



                (NA, K, CL, CO2, GLUCOSE,                                 Medica

l Branch



                BUN, CREATININE, CA)                                 

 

                CBC WITH DIFF   2022 10:03:00 Yvrose Brisenonathan [facility]

 

                POCT GLUCOSE (AUTOMATED) 2022 02:18:00 Scooby Sharpe

[facility]

 

                POCT GLUCOSE (AUTOMATED) 2022-08-10 22:26:00 Scooby Sharpe

[facility]

 

                POCT GLUCOSE (AUTOMATED) 2022-08-10 18:57:00 Scooby Shrape [facility]

 

                POCT GLUCOSE (AUTOMATED) 2022-08-10 18:57:00 [facility]

 

                US DUPLEX VENOUS ARMS 2022-08-10 17:28:45 Finn Matt [facility]

[facility] [place]



                BILATERAL - BY VASCULAR                                 [facility]



                LAB                                             

 

                US DUPLEX VENOUS ARMS 2022-08-10 17:28:45 Finn Matt [facility]

[facility] [place]



                BILATERAL - BY VASCULAR                                 [facility]



                LAB                                             

 

                POCT GLUCOSE (AUTOMATED) 2022-08-10 15:20:00 [facility]

 

                POCT GLUCOSE (AUTOMATED) 2022-08-10 15:20:00 [facility]

 

                MAGNESIUM       2022-08-10 10:19:00 [facility]

 

                BASIC METABOLIC PANEL 2022-08-10 10:19:00 Stas Edwardo Un[facility]

[facility] [place]



                (NA, K, CL, CO2, GLUCOSE,                 Robin         Medica

l Branch



                BUN, CREATININE, CA)                                 

 

                CBC WITH DIFF   2022-08-10 10:19:00 [facility]

 

                MAGNESIUM       2022-08-10 10:19:00 [facility]

 

                BASIC METABOLIC PANEL 2022-08-10 10:19:00 Stas Edwardo Un[facility]

[facility] [place]



                (NA, K, CL, CO2, GLUCOSE,                 Robin         Medica

l Branch



                BUN, CREATININE, CA)                                 

 

                CBC WITH DIFF   2022-08-10 10:19:00 [facility]

 

                POCT GLUCOSE (AUTOMATED) 2022-08-10 02:44:00 [facility] Shar[facility]

 

                POCT GLUCOSE (AUTOMATED) 2022-08-10 02:44:00 [facility]

 

                POCT GLUCOSE (AUTOMATED) 2022 22:02:00 Scooby Sharpe

niversity [facility]

 

                POCT GLUCOSE (AUTOMATED) 2022 22:02:00 [facility]

 

                FUNGUS (ROUTINE) CULTURE 2022 17:03:00 Merry Bonds [facility]

 

                TISSUE          2022 17:03:00 Sapphire Bonds [facility]



                CULTURE(AEROBIC/ANAEROBIC                 A               Medica

l Branch



                )                                               

 

                FUNGUS (ROUTINE) CULTURE 2022 17:03:00 Merry Bonds [facility]

 

                TISSUE          2022 17:03:00 Sapphire Bonds [facility]



                CULTURE(AEROBIC/ANAEROBIC                 A               Medica

l Branch



                )                                               

 

                FUNGUS (ROUTINE) CULTURE 2022 17:00:00 Merry Bonds [facility]

 

                TISSUE          2022 17:00:00 Sapphire Bonds [facility]



                CULTURE(AEROBIC/ANAEROBIC                 A               Medica

l Branch



                )                                               

 

                FUNGUS (ROUTINE) CULTURE 2022 17:00:00 Merry Bonds [facility]

 

                TISSUE          2022 17:00:00 Sapphire Bonds [facility]



                CULTURE(AEROBIC/ANAEROBIC                 A               Medica

l Branch



                )                                               

 

                FUNGUS (ROUTINE) CULTURE 2022 16:59:00 Merry Bonds [facility]

 

                TISSUE          2022 16:59:00 Sapphire Bonds [facility]



                CULTURE(AEROBIC/ANAEROBIC                 A               Medica

l Branch



                )                                               

 

                FUNGUS (ROUTINE) CULTURE 2022 16:59:00 Merry Bonds [facility]

 

                TISSUE          2022 16:59:00 Sapphire Bonds [facility]



                CULTURE(AEROBIC/ANAEROBIC                 A               Medica

l Branch



                )                                               

 

                FOOT DEBRIDEMENT 2022 16:11:00 Sapphire Bonds [facility]               Medical [place]

 

                FOOT DEBRIDEMENT 2022 16:11:00 Sapphire Bonds [facility]

 

                COVID-19 (ID NOW RAPID 2022 14:36:00 Matt Briseno [facility]

[facility] [place]



                TESTING)                                        Medical Branch

 

                LAB ONLY COVID  2022 14:36:00 Matt Briseno [facility]



                INTERPRETATION                                  [facility]

 

                COVID-19 (ID NOW RAPID 2022 14:36:00 Matt Briseno [facility]

[facility] [place]



                TESTING)                                        Medical Branch

 

                LAB ONLY COVID  2022 14:36:00 Matt Briseno [facility]

 

                POCT GLUCOSE (AUTOMATED) 2022 14:07:00 An Chambers  [facility]

 

                POCT GLUCOSE (AUTOMATED) 2022 14:07:00 An Chambers  [facility]

 

                HB ECG ROUTINE & RHYTHM 2022 13:27:17 Y[facility]

 

                CBC WITH DIFF   2022 10:55:00 [facility]

 

                CBC WITH DIFF   2022 10:55:00 [facility]

 

                MAGNESIUM       2022 10:40:00 [facility]

 

                BASIC METABOLIC PANEL 2022 10:40:00 Edwardo Mcclelland Un[facility]

[facility] [place]



                (NA, K, CL, CO2, GLUCOSE,                 Robin         Medica

l Branch



                BUN, CREATININE, CA)                                 

 

                COVID-19 (ID NOW RAPID 2022 10:40:00 [facility]



                TESTING)                                        Medical Branch

 

                LAB ONLY COVID  2022 10:40:00 Valeriy [facility]                                  Medical Branch

 

                MAGNESIUM       2022 10:40:00 [facility]

 

                BASIC METABOLIC PANEL 2022 10:40:00 Edwardo Mcclelland Un[facility]

[facility] [place]



                (NA, K, CL, CO2, GLUCOSE,                 Robin         Medica

l Branch



                BUN, CREATININE, CA)                                 

 

                COVID-19 (ID NOW RAPID 2022 10:40:00 [facility]



                TESTING)                                        Medical Branch

 

                LAB ONLY COVID  2022 10:40:00 [place] [facility]

 

                POCT GLUCOSE (AUTOMATED) 2022 02:29:00 An Chambers  [facility]

 

                POCT GLUCOSE (AUTOMATED) 2022 02:29:00 An Chambers  Uni

versity of [facility]

 

                POCT GLUCOSE (AUTOMATED) 2022 23:39:00 An Chambers  Uni

versity of [facility]

 

                POCT GLUCOSE (AUTOMATED) 2022 23:39:00 An Chambers  Uni

versity of [facility]

 

                POCT GLUCOSE (AUTOMATED) 2022 17:10:00 An Chambers  Uni

versity of [facility]

 

                POCT GLUCOSE (AUTOMATED) 2022 17:10:00 An Chambers  Uni

versity of [facility]

 

                POCT GLUCOSE (AUTOMATED) 2022 17:10:00 An Chambers  Uni

versity of [facility]

 

                POCT GLUCOSE (AUTOMATED) 2022 15:54:00 An Chambers  Uni

versity of [facility]

 

                POCT GLUCOSE (AUTOMATED) 2022 15:54:00 An Chambers  Uni

versity of [facility]

 

                POCT GLUCOSE (AUTOMATED) 2022 15:54:00 An Chambers  Uni

versity [facility]

 

                SEDIMENTATION RATE 2022 08:36:00 Isabell [facility]

 

                BASIC METABOLIC PANEL 2022 08:36:00 [facility]



                (NA, K, CL, CO2, GLUCOSE,                                 Medica

l Branch



                BUN, CREATININE, CA)                                 

 

                CBC WITH DIFF   2022 08:36:00 Zaki [facility]

 

                MAGNESIUM       2022 08:36:00 Zaki [facility]

 

                MAGNESIUM       2022 08:36:00 Zaki [facility]

 

                BASIC METABOLIC PANEL 2022 08:36:00 Zaki [facility]



                (NA, K, CL, CO2, GLUCOSE,                                 Medica

l Branch



                BUN, CREATININE, CA)                                 

 

                SEDIMENTATION RATE 2022 08:36:00 Isabell [facility]

 

                CBC WITH DIFF   2022 08:36:00 Zaki [facility]

 

                MAGNESIUM       2022 08:36:00 Zaki [facility]

 

                BASIC METABOLIC PANEL 2022 08:36:00 Zaki [facility]



                (NA, K, CL, CO2, GLUCOSE,                                 Medica

l Branch



                BUN, CREATININE, CA)                                 

 

                SEDIMENTATION RATE 2022 08:36:00 Karina Whyte     [facility]

 

                CBC WITH DIFF   2022 08:36:00 Zaki [facility]

 

                POCT GLUCOSE (AUTOMATED) 2022 00:57:00 Yo Law Un

iversity of 

[facility]

 

                POCT GLUCOSE (AUTOMATED) 2022 00:57:00 Yo Law B Un

iversity of 

[facility]

 

                POCT GLUCOSE (AUTOMATED) 2022 00:57:00 Yo Law Un

iversity of 

[facility]

 

                POCT GLUCOSE (AUTOMATED) 2022 21:26:00 Yo Law B Un

iversity of 

[facility]

 

                POCT GLUCOSE (AUTOMATED) 2022 21:26:00 Yo Law Un

iversity of 

[facility]

 

                POCT GLUCOSE (AUTOMATED) 2022 21:26:00 Yo Law B Un

iversity of 

[facility]

 

                POCT GLUCOSE (AUTOMATED) 2022 16:47:00 Yo Law Un

iversity of 

[facility]

 

                POCT GLUCOSE (AUTOMATED) 2022 16:47:00 Yo Law B Un

iversity of 

[facility]

 

                POCT GLUCOSE (AUTOMATED) 2022 16:47:00 Yo Law Un

iversity of 

[facility]

 

                BASIC METABOLIC PANEL 2022 14:21:00 Carolann Haines [facility]



                (NA, K, CL, CO2, GLUCOSE,                                 Medica

l Branch



                BUN, CREATININE, CA)                                 

 

                C-REACTIVE PROTEIN 2022 14:21:00 Karina Whyte     [facility]

 

                C-REACTIVE PROTEIN 2022 14:21:00 Isabell [facility]

 

                BASIC METABOLIC PANEL 2022 14:21:00 Carolann Haines [facility]



                (NA, K, CL, CO2, GLUCOSE,                                 Medica

l Branch



                BUN, CREATININE, CA)                                 

 

                C-REACTIVE PROTEIN 2022 14:21:00 [facility]

 

                BASIC METABOLIC PANEL 2022 14:21:00 Carolann Haines [facility]



                (NA, K, CL, CO2, GLUCOSE,                                 Medica

l Branch



                BUN, CREATININE, CA)                                 

 

                POCT GLUCOSE (AUTOMATED) 2022 12:34:00 Yo Law B Un

iversity of 

[facility]

 

                POCT GLUCOSE (AUTOMATED) 2022 12:34:00 Abdirahman Lawy B Un

iversity of 

[place]



                                                                Medical Branch

 

                POCT GLUCOSE (AUTOMATED) 2022 12:34:00 Yo Law B Un

iversity of 

[place]



                                                                Medical Branch

 

                POCT GLUCOSE (AUTOMATED) 2022 01:28:00 Abdirahman Lawy B Un

iversity of 

[place]



                                                                Medical Branch

 

                POCT GLUCOSE (AUTOMATED) 2022 01:28:00 ThanhAbdirahmany B Un

iversity of 

[place]



                                                                Medical Branch

 

                POCT GLUCOSE (AUTOMATED) 2022 01:28:00 Abdirahman Lawy B Un

iversity of 

[place]



                                                                Medical Branch

 

                POCT GLUCOSE (AUTOMATED) 2022 21:13:00 MifflintownAbdirahmany B Un

iversity of 

[place]



                                                                Medical Branch

 

                POCT GLUCOSE (AUTOMATED) 2022 21:13:00 [place] Yo B Un

iversity of 

[place]



                                                                Medical Branch

 

                POCT GLUCOSE (AUTOMATED) 2022 21:13:00 [place] Yo B Un

iversity of 

[place]



                                                                Medical Branch

 

                POCT GLUCOSE (AUTOMATED) 2022 16:39:00 Thanh Yo B Un

iversity of 

[place]



                                                                Medical Branch

 

                POCT GLUCOSE (AUTOMATED) 2022 16:39:00 MifflintownAbdirahmany B Un

iversity of 

[place]



                                                                Medical Branch

 

                POCT GLUCOSE (AUTOMATED) 2022 16:39:00 Yo Law Un

iversity of 

[facility]

 

                POCT GLUCOSE (AUTOMATED) 2022 13:11:00 Yo Law Un

iversity of 

[facility]

 

                POCT GLUCOSE (AUTOMATED) 2022 13:11:00 Yo Law Un

iversity of 

[facility]

 

                POCT GLUCOSE (AUTOMATED) 2022 13:11:00 Yo Law Un

iversity of 

[facility]

 

                MAGNESIUM       2022 10:28:00 Shaq [facility]

 

                BASIC METABOLIC PANEL 2022 10:28:00 [facility]



                (NA, K, CL, CO2, GLUCOSE,                                 Medica

l Branch



                BUN, CREATININE, CA)                                 

 

                MAGNESIUM       2022 10:28:00 Shaq [facility]

 

                BASIC METABOLIC PANEL 2022 10:28:00 [facility]



                (NA, K, CL, CO2, GLUCOSE,                                 Medica

l Branch



                BUN, CREATININE, CA)                                 

 

                MAGNESIUM       2022 10:28:00 [facility]

 

                BASIC METABOLIC PANEL 2022 10:28:00 [facility]



                (NA, K, CL, CO2, GLUCOSE,                                 Medica

l Branch



                BUN, CREATININE, CA)                                 

 

                POCT GLUCOSE (AUTOMATED) 2022 10:01:00 Yo Law Un

iversity of 

[facility]

 

                POCT GLUCOSE (AUTOMATED) 2022 10:01:00 Yo Law Un

iversity of 

[facility]

 

                POCT GLUCOSE (AUTOMATED) 2022 10:01:00 Yo Law Un

iversity of 

[facility]

 

                POCT GLUCOSE (AUTOMATED) 2022 06:32:00 Yo Law Un

iversity of 

[facility]

 

                POCT GLUCOSE (AUTOMATED) 2022 06:32:00 Yo Law Un

iversity of 

[facility]

 

                POCT GLUCOSE (AUTOMATED) 2022 06:32:00 Yo Law Un

iversity of 

[facility]

 

                BASIC METABOLIC PANEL 2022 02:01:00 Chana [facility]



                (NA, K, CL, CO2, GLUCOSE,                                 Medica

l Branch



                BUN, CREATININE, CA)                                 

 

                BASIC METABOLIC PANEL 2022 02:01:00 [facility]



                (NA, K, CL, CO2, GLUCOSE,                                 Medica

l Branch



                BUN, CREATININE, CA)                                 

 

                BASIC METABOLIC PANEL 2022 02:01:00 Shaq rubén [facility]



                (NA, K, CL, CO2, GLUCOSE,                                 Medica

l Branch



                BUN, CREATININE, CA)                                 

 

                POCT GLUCOSE (AUTOMATED) 2022 01:53:00 Yo Law Un

iversity of 

[facility]

 

                POCT GLUCOSE (AUTOMATED) 2022 01:53:00 Yo Law Un

iversity of 

[facility]

 

                POCT GLUCOSE (AUTOMATED) 2022 01:53:00 Yo Law Un

iversity of 

[facility]

 

                POCT GLUCOSE (AUTOMATED) 2022 23:02:00 Yo Law Un

iversity of 

[facility]

 

                POCT GLUCOSE (AUTOMATED) 2022 23:02:00 Yo Law Un

iversity of 

[place]



                                                                Medical Branch

 

                POCT GLUCOSE (AUTOMATED) 2022 23:02:00 Yo Law Un

iversity of 

[place]



                                                                Medical Branch

 

                XR FOOT 3+ VW BILATERAL 2022 21:35:00 Vamshi Sapphire

 [facility]               Medical [place]

 

                XR FOOT 3+ VW BILATERAL 2022 21:35:00 Vamshi [facility]



                                                A               Medical Branch

 

                XR FOOT 3+ VW BILATERAL 2022 21:35:00 [facility]

 

                POCT GLUCOSE (AUTOMATED) 2022 20:45:00 Yo Law Un

iversity of 

[facility]

 

                POCT GLUCOSE (AUTOMATED) 2022 20:45:00 Yo Law Un

iversity of 

[facility]

 

                POCT GLUCOSE (AUTOMATED) 2022 20:45:00 Yo Law Un

iversity of 

[place]



                                                                Medical Branch

 

                POCT GLUCOSE (AUTOMATED) 2022 16:41:00 Yo Law Un

iversity of 

[facility]

 

                POCT GLUCOSE (AUTOMATED) 2022 16:41:00 Yo Law Un

iversity of 

[facility]

 

                POCT GLUCOSE (AUTOMATED) 2022 16:41:00 Yo Law Un

iversity of 

[facility]

 

                BASIC METABOLIC PANEL 2022 15:35:00 Yo Law Unive

rsity [facility] [place]



                (NA, K, CL, CO2, GLUCOSE,                                 Medica

l Branch



                BUN, CREATININE, CA)                                 

 

                BASIC METABOLIC PANEL 2022 15:35:00 [facility]



                (NA, K, CL, CO2, GLUCOSE,                                 Medica

l Branch



                BUN, CREATININE, CA)                                 

 

                BASIC METABOLIC PANEL 2022 15:35:00 Yo Law Unive

rsity [facility]



                (NA, K, CL, CO2, GLUCOSE,                                 Medica

l Branch



                BUN, CREATININE, CA)                                 

 

                BETA HYDROXY-BUTYRATE 2022 15:34:00 Hi Collazo     [facility]

 

                BETA HYDROXY-BUTYRATE 2022 15:34:00 Hi Collazo     [facility]

 

                BETA HYDROXY-BUTYRATE 2022 15:34:00 Hi Collazo     [facility]

 

                POCT GLUCOSE (AUTOMATED) 2022 14:20:00 Yo Law Un

iversity of 

[facility]

 

                POCT GLUCOSE (AUTOMATED) 2022 14:20:00 Yo Law Un

iversity of 

[facility]

 

                POCT GLUCOSE (AUTOMATED) 2022 14:20:00 Yo Law Un

iversity of 

[facility]

 

                POCT GLUCOSE (AUTOMATED) 2022 12:52:00 Yo Law Un

iversity of 

[facility]

 

                POCT GLUCOSE (AUTOMATED) 2022 12:52:00 Yo Law Un

iversity [facility]

 

                POCT GLUCOSE (AUTOMATED) 2022 12:52:00 Yo Law Un

iversity of 

[facility]

 

                POCT GLUCOSE (AUTOMATED) 2022 09:04:00 Yo Law Un

iversity of 

[facility]

 

                POCT GLUCOSE (AUTOMATED) 2022 09:04:00 Yo Law Un

iversity of 

[facility]

 

                POCT GLUCOSE (AUTOMATED) 2022 09:04:00 Yo Law Un

iversity [facility]

 

                BASIC METABOLIC PANEL 2022 06:13:00 Skylar Tinajero  [facility]



                (NA, K, CL, CO2, GLUCOSE,                                 Medica

l Branch



                BUN, CREATININE, CA)                                 

 

                GLUTAMIC ACID   2022 06:13:00 Skylar Tinajero  [facility]



                DECARBOXYLASE [facility]

 

                CBC WITHOUT DIFF 2022 06:13:00 [facility]

 

                MAGNESIUM       2022 06:13:00 [facility]

 

                MAGNESIUM       2022 06:13:00 Tanvi [facility]

 

                BASIC METABOLIC PANEL 2022 06:13:00 [facility]



                (NA, K, CL, CO2, GLUCOSE,                                 Medica

l Branch



                BUN, CREATININE, CA)                                 

 

                CBC WITHOUT DIFF 2022 06:13:00 [facility]

 

                GLUTAMIC ACID   2022 06:13:00 [facility]



                DECARBOXYLASE [facility]

 

                MAGNESIUM       2022 06:13:00 Tanvi [facility]

 

                BASIC METABOLIC PANEL 2022 06:13:00 [facility]



                (NA, K, CL, CO2, GLUCOSE,                                 Medica

l Branch



                BUN, CREATININE, CA)                                 

 

                CBC WITHOUT DIFF 2022 06:13:00 Tanvi [facility]

 

                GLUTAMIC ACID   2022 06:13:00 Skylar Tinajero  [place] [facility]

[facility] [place]



                DECARBOXYLASE AB                                 [facility]

 

                POCT GLUCOSE (AUTOMATED) 2022 05:45:00 Yo Law Un

iversity of 

[facility]

 

                POCT GLUCOSE (AUTOMATED) 2022 05:45:00 Yo Law Un

iversity of 

[facility]

 

                POCT GLUCOSE (AUTOMATED) 2022 05:45:00 Yo Law Un

iversity of 

[facility]

 

                POCT GLUCOSE (AUTOMATED) 2022 01:26:00 Yo Law Un

iversity of 

[facility]

 

                POCT GLUCOSE (AUTOMATED) 2022 01:26:00 Yo Law Un

iversity of 

[facility]

 

                POCT GLUCOSE (AUTOMATED) 2022 01:26:00 Yo Law Un

iversity of 

[facility]

 

                POCT GLUCOSE (AUTOMATED) 2022 23:42:00 Yo Law Un

iversity of 

[facility]

 

                POCT GLUCOSE (AUTOMATED) 2022 23:42:00 Yo Law Un

iversity of 

[facility]

 

                POCT GLUCOSE (AUTOMATED) 2022 23:42:00 Yo Law Un

iversity of 

[facility]

 

                BASIC METABOLIC PANEL 2022 22:51:00 Yo Law Unive

rsity [facility] [place]



                (NA, K, CL, CO2, GLUCOSE,                                 Medica

l Branch



                BUN, CREATININE, CA)                                 

 

                BASIC METABOLIC PANEL 2022 22:51:00 Yo Law Unive

rsity [facility] [place]



                (NA, K, CL, CO2, GLUCOSE,                                 Medica

l Branch



                BUN, CREATININE, CA)                                 

 

                BASIC METABOLIC PANEL 2022 22:51:00 Yo Law Unive

rsity of [place]



                (NA, K, CL, CO2, GLUCOSE,                                 Medica

l Branch



                BUN, CREATININE, CA)                                 

 

                POCT GLUCOSE (AUTOMATED) 2022 22:37:00 Yo Law Un

iversity of 

[facility]

 

                POCT GLUCOSE (AUTOMATED) 2022 22:37:00 Yo Law Un

iversity of 

[place]



                                                                Medical Branch

 

                POCT GLUCOSE (AUTOMATED) 2022 22:37:00 Yo Law Un

iversity of 

[place]



                                                                Medical Branch

 

                POCT GLUCOSE (AUTOMATED) 2022 21:30:00 Yo Law Un

iversity of 

[place]



                                                                Medical Branch

 

                POCT GLUCOSE (AUTOMATED) 2022 21:30:00 Yo Law Un

iversity of 

[place]



                                                                Medical Branch

 

                POCT GLUCOSE (AUTOMATED) 2022 21:30:00 Yo Law Un

iversity of 

[place]



                                                                Medical Branch

 

                POCT GLUCOSE (AUTOMATED) 2022 20:05:00 Yo Law Un

iversity of 

[facility]

 

                POCT GLUCOSE (AUTOMATED) 2022 20:05:00 Yo Law Un

iversity of 

[facility]

 

                POCT GLUCOSE (AUTOMATED) 2022 20:05:00 Yo Law Un

iversity of 

[facility]

 

                HB ECG ROUTINE & RHYTHM 2022 19:59:13 Chana [place] [facility]

versity of [facility]

 

                HB ECG ROUTINE & RHYTHM 2022 19:59:13 Shaq Ahrubén Chon Uni

versity of [facility]                                           Medical Branch

 

                HB ECG ROUTINE & RHYTHM 2022 19:59:13 Chana [place] Uni

versity of [facility]

 

                POCT GLUCOSE (AUTOMATED) 2022 19:06:00 Yo Law Un

iversity of 

[facility]

 

                POCT GLUCOSE (AUTOMATED) 2022 19:06:00 Yo Law Un

iversity of 

[facility]

 

                POCT GLUCOSE (AUTOMATED) 2022 19:06:00 Yo Law Un

iversity of 

[facility]

 

                BLOOD CULTURE SCREEN 2022 18:49:00 Carolann Haines of [facility]

 

                BLOOD CULTURE SCREEN 2022 18:49:00 Carolann Haines of [facility]

 

                BLOOD CULTURE SCREEN 2022 18:49:00 Haines, Carolann [facility]

 

                BLOOD CULTURE SCREEN 2022 18:48:00 Carolann Haines [facility]

 

                BLOOD CULTURE SCREEN 2022 18:48:00 Carolann Haines [facility]

 

                BLOOD CULTURE SCREEN 2022 18:48:00 Haines, Anni [facility]

 

                XR CHEST 1 VW   2022 18:34:00 Butt, [facility]

 

                XR CHEST 1 VW   2022 18:34:00 Butt, [facility]

 

                XR CHEST 1 VW   2022 18:34:00 Butt, [facility]

 

                BASIC METABOLIC PANEL 2022 17:49:00 [facility]

[facility]



                (NA, K, CL, CO2, GLUCOSE,                                 Medica

l Branch



                BUN, CREATININE, CA)                                 

 

                TROPONIN I      2022 17:49:00 [facility]

 

                TROPONIN I      2022 17:49:00 [facility]

 

                BASIC METABOLIC PANEL 2022 17:49:00 Yo Law [facility]

[facility]



                (NA, K, CL, CO2, GLUCOSE,                                 Medica

l Branch



                BUN, CREATININE, CA)                                 

 

                TROPONIN I      2022 17:49:00 [facility]

 

                BASIC METABOLIC PANEL 2022 17:49:00 [facility] Law [facility]

[facility]



                (NA, K, CL, CO2, GLUCOSE,                                 Medica

l Branch



                BUN, CREATININE, CA)                                 

 

                POCT GLUCOSE (AUTOMATED) 2022 17:27:00 Yo Law B Un

iversity [facility]

 

                POCT GLUCOSE (AUTOMATED) 2022 17:27:00 Yo Law B Un

iversity [facility]

 

                POCT GLUCOSE (AUTOMATED) 2022 17:27:00 Yo Law Un

[facility]

 

                POCT GLUCOSE (AUTOMATED) 2022 15:53:00 Yo Law Un

iversity of 

[place]



                                                                Medical Branch

 

                POCT GLUCOSE (AUTOMATED) 2022 15:53:00 Yo Lwa Un

iversity of 

[place]



                                                                Medical Branch

 

                POCT GLUCOSE (AUTOMATED) 2022 15:53:00 Yo Law Un

iversity of 

[place]



                                                                Medical Branch

 

                BASIC METABOLIC PANEL 2022 15:32:00 Yo Law Unive

rsity of [place]



                (NA, K, CL, CO2, GLUCOSE,                                 Medica

l Branch



                BUN, CREATININE, CA)                                 

 

                BASIC METABOLIC PANEL 2022 15:32:00 Yo Law Unive

rsity of [place]



                (NA, K, CL, CO2, GLUCOSE,                                 Medica

l Branch



                BUN, CREATININE, CA)                                 

 

                BASIC METABOLIC PANEL 2022 15:32:00 Yo Law Unive

rsity [facility] [place]



                (NA, K, CL, CO2, GLUCOSE,                                 Medica

l Branch



                BUN, CREATININE, CA)                                 

 

                POCT GLUCOSE (AUTOMATED) 2022 14:56:00 Yo Law Un

iversity of 

[place]



                                                                Medical Branch

 

                POCT GLUCOSE (AUTOMATED) 2022 14:56:00 Yo Law Un

iversity of 

[place]



                                                                Medical Branch

 

                POCT GLUCOSE (AUTOMATED) 2022 14:56:00 Yo Law Un

iversity of 

[place]



                                                                Medical Branch

 

                POCT GLUCOSE (AUTOMATED) 2022 13:48:00 Yo Law Un

iversity of 

[place]



                                                                Medical Branch

 

                POCT GLUCOSE (AUTOMATED) 2022 13:48:00 Yo Law Un

iversity of 

[place]



                                                                Medical Branch

 

                POCT GLUCOSE (AUTOMATED) 2022 13:48:00 Yo Law Un

iversity of 

[place]



                                                                Medical Branch

 

                POCT GLUCOSE (AUTOMATED) 2022 10:42:00 Yo Law Un

iversity of 

[place]



                                                                Medical Branch

 

                POCT GLUCOSE (AUTOMATED) 2022 10:42:00 Yo Law Un

iversity of 

[place]



                                                                Medical Branch

 

                POCT GLUCOSE (AUTOMATED) 2022 10:42:00 Yo Law Un

[facility]

 

                BASIC METABOLIC PANEL 2022 10:14:00 [facility]



                (NA, K, CL, CO2, GLUCOSE,                                 Medica

l Branch



                BUN, CREATININE, CA)                                 

 

                MAGNESIUM       2022 10:14:00 [facility]

 

                MAGNESIUM       2022 10:14:00 [facility]

 

                BASIC METABOLIC PANEL 2022 10:14:00 [facility]



                (NA, K, CL, CO2, GLUCOSE,                                 Medica

l Branch



                BUN, CREATININE, CA)                                 

 

                MAGNESIUM       2022 10:14:00 [facility]

 

                BASIC METABOLIC PANEL 2022 10:14:00 [facility]



                (NA, K, CL, CO2, GLUCOSE,                                 Medica

l Branch



                BUN, CREATININE, CA)                                 

 

                POCT GLUCOSE (AUTOMATED) 2022 09:30:00 Yo Law Un

[facility]

 

                POCT GLUCOSE (AUTOMATED) 2022 09:30:00 Yo Law Un

[facility]

 

                POCT GLUCOSE (AUTOMATED) 2022 09:30:00 Yo Law Un

[facility]

 

                POCT GLUCOSE (AUTOMATED) 2022 07:11:00 Yo Law Un

[facility]

 

                POCT GLUCOSE (AUTOMATED) 2022 07:11:00 Yo Law Un

[facility]

 

                POCT GLUCOSE (AUTOMATED) 2022 07:11:00 Yo Law Un

[facility]

 

                OSMOLALITY, SERUM OR 2022 07:07:00 Yo Law [facility]



                PLASMA                                          [facility]

 

                BETA HYDROXY-BUTYRATE 2022 07:07:00 [facility]

[facility]

 

                OSMOLALITY, SERUM OR 2022 07:07:00 Yo Law Univer

[facility]

 

                BETA HYDROXY-BUTYRATE 2022 07:07:00 Yo Law Univrandell

[facility]

 

                OSMOLALITY, SERUM OR 2022 07:07:00 Yo Law

[facility]

 

                BETA HYDROXY-BUTYRATE 2022 07:07:00 Yo Law Unive

[facility]

 

                BASIC METABOLIC PANEL 2022 07:06:00 Yo Law Unive

[facility]



                (NA, K, CL, CO2, GLUCOSE,                                 Medica

l Branch



                BUN, CREATININE, CA)                                 

 

                BASIC METABOLIC PANEL 2022 07:06:00 Yo Law Unive

[facility]



                (NA, K, CL, CO2, GLUCOSE,                                 Medica

l Branch



                BUN, CREATININE, CA)                                 

 

                BASIC METABOLIC PANEL 2022 07:06:00 Yo Law Unive

[facility]



                (NA, K, CL, CO2, GLUCOSE,                                 Medica

l Branch



                BUN, CREATININE, CA)                                 

 

                CT ABDOMEN PELVIS W 2022 05:28:46 [facility] Law [facility]



                CONTRAST                                        [facility]

 

                CT ABDOMEN PELVIS W 2022 05:28:46 Yo Law Univers

ity [facility]



                CONTRAST                                        [facility]

 

                CT ABDOMEN PELVIS W 2022 05:28:46 Yo Law Univers

[facility]



                CONTRAST                                        [facility]

 

                POCT GLUCOSE(AGE >30DAYS) 2022 05:11:00 Yo Law [facility]

[facility]

 

                POCT GLUCOSE(AGE >30DAYS) 2022 05:11:00 Yo Law [facility]

[facility]

 

                POCT GLUCOSE(AGE >30DAYS) 2022 05:11:00 Yo Law [facility]

[facility]

 

                POCT GLUCOSE (AUTOMATED) 2022 05:08:00 Yo Law Un

iversity [facility]

 

                POCT GLUCOSE (AUTOMATED) 2022 05:08:00 [facility] Law [facility]

 

                POCT GLUCOSE (AUTOMATED) 2022 05:08:00 Yo Law Un

[facility]

 

                BLOOD CULTURE SCREEN 2022 04:31:00 Yo Law [facility]

 

                BLOOD CULTURE WORKUP 2022 04:31:00 Yo Law [facility]

 

                GRAM POSITIVE BLOOD 2022 04:31:00 Yo Law [facility]



                [facility]



                PROBE-AEROBIC                                   

 

                BLOOD CULTURE SCREEN 2022 04:31:00 Yo Law [facility]

 

                BLOOD CULTURE WORKUP 2022 04:31:00 Yo Law [facility]

 

                GRAM POSITIVE BLOOD 2022 04:31:00 Yo Law [facility]



                [facility]



                PROBE-AEROBIC                                   

 

                BLOOD CULTURE SCREEN 2022 04:31:00 Yo Law [facility]

 

                BLOOD CULTURE WORKUP 2022 04:31:00 Yo Law [facility]

 

                GRAM POSITIVE BLOOD 2022 04:31:00 Yo Law [facility]



                PROBE-AEROBIC                                   

 

                URINALYSIS      2022 04:21:00 Yo Law [facility]

 

                URINE CULTURE   2022 04:21:00 Yo Law [facility]

 

                URINALYSIS      2022 04:21:00 Yo Law [facility]

 

                URINE CULTURE   2022 04:21:00 Yo Law [facility]

 

                URINALYSIS      2022 04:21:00 Yo Law [facility]

 

                URINE CULTURE   2022 04:21:00 Yo Law [facility]

 

                COVID-19 (ID NOW RAPID 2022 04:20:00 Yo Law Univ

[facility] [place]



                TESTING)                                        Medical Branch

 

                LAB ONLY COVID  2022 04:20:00 Yo Law [facility]

 

                COVID-19 (ID NOW RAPID 2022 04:20:00 Thanh Yo B Univ

[facility] [place]



                TESTING)                                        Medical Branch

 

                LAB ONLY COVID  2022 04:20:00 Yo Law [facility]

 

                COVID-19 (ID NOW RAPID 2022 04:20:00 Thanh Yo B Univ

[facility] [place]



                TESTING)                                        Medical Branch

 

                LAB ONLY COVID  2022 04:20:00 Yo Law [facility]

 

                CBC WITH DIFF   2022 04:18:00 Yo Law [facility]

 

                PROTHROMBIN TIME / INR 2022 04:18:00 Thanh Yo [facility]

 

                BASIC METABOLIC PANEL 2022 04:18:00 Yo Law [facility]



                (NA, K, CL, CO2, GLUCOSE,                                 Medica

l Branch



                BUN, CREATININE, CA)                                 

 

                HEPATIC FUNCTION PANEL 2022 04:18:00 Thanh Yo B Univ

[facility] [place]



                ([id number]) (ALB,T.PRO,BILI                                 [facility] 

Branch



                T,BU/BC,ALT,AST,ALK PHOS)                                 

 

                LIPASE          2022 04:18:00 Yo Law [facility]

 

                ACUTE CARE VENOUS BLOOD 2022 04:18:00 Yo Law Uni

[facility] [place]



                GAS                                             [facility]

 

                LACTIC ACID WHOLE BLOOD 2022 04:18:00 Yo Law

[facility]

 

                MAGNESIUM       2022 04:18:00 Yo Law [facility]

 

                PHOSPHORUS      2022 04:18:00 Yo Law [facility]

 

                GLYCOSYLATED HEMOGLOBIN 2022 04:18:00 Yo Law Uni

[facility] [place]



                (A1C)                                           [facility]

 

                PHOSPHORUS      2022 04:18:00 Yo Law [facility]

 

                LIPASE          2022 04:18:00 Yo Law [facility]

 

                MAGNESIUM       2022 04:18:00 Yo Law [facility]

 

                HEPATIC FUNCTION PANEL 2022 04:18:00 Yo Law Univ

[facility] [place]



                ([id number]) (ALB,T.PRO,[facility]                                 Medical 

Branch



                T,BU/BC,ALT,AST,ALK PHOS)                                 

 

                BASIC METABOLIC PANEL 2022 04:18:00 Yo Law [facility]



                (NA, K, CL, CO2, GLUCOSE,                                 Medica

l Branch



                BUN, CREATININE, CA)                                 

 

                ACUTE CARE VENOUS BLOOD 2022 04:18:00 Yo Law [facility]

 

                CBC WITH DIFF   2022 04:18:00 Yo Law [facility]

 

                GLYCOSYLATED HEMOGLOBIN 2022 04:18:00 Yo Law Uni

[facility] [place]



                (A1C)                                           [facility]

 

                PROTHROMBIN TIME / INR 2022 04:18:00 Yo Law [facility]

 

                LACTIC ACID WHOLE BLOOD 2022 04:18:00 Yo Law [facility]

 

                PHOSPHORUS      2022 04:18:00 Yo Law [facility]

 

                LIPASE          2022 04:18:00 Yo Law [facility]

 

                MAGNESIUM       2022 04:18:00 Yo Law [facility]

 

                HEPATIC FUNCTION PANEL 2022 04:18:00 Yo Law Univ

[facility] [place]



                ([id number]) (ALB,T.PRO,[facility]



                T,BU/BC,ALT,AST,ALK PHOS)                                 

 

                BASIC METABOLIC PANEL 2022 04:18:00 Yo Law [facility]



                (NA, K, CL, CO2, GLUCOSE,                                 Medica

l Branch



                BUN, CREATININE, CA)                                 

 

                ACUTE CARE VENOUS BLOOD 2022 04:18:00 Yo Law [facility]

 

                CBC WITH DIFF   2022 04:18:00 Yo Law [facility]

 

                GLYCOSYLATED HEMOGLOBIN 2022 04:18:00 Yo Law Uni

[facility] [place]



                (A1C)                                           [facility]

 

                PROTHROMBIN TIME / INR 2022 04:18:00 Yo Law Univ

[facility]

 

                LACTIC ACID WHOLE BLOOD 2022 04:18:00 Yo Law Uni

versity of [facility]

 

                EMERGENCY DEPARTMENT 2022 05:01:00 Doctor Unassigned, No U

niversity of 

[facility]

 

                HOSPITAL ADMISSION 2022 05:01:00 Doctor Unassigned, No Uni

versity of [facility]

 

                EMERGENCY DEPARTMENT 2022 05:01:00 Doctor Unassigned, No U

niversity of 

[facility]

 

                HOSPITAL ADMISSION 2022 05:01:00 Doctor Unassigned, No Uni

versity of [facility]

 

                EMERGENCY DEPARTMENT 2022 05:01:00 Doctor Unassigned, No U

niversity of 

[facility]

 

                HOSPITAL ADMISSION 2022 05:01:00 Doctor Unassigned, No Uni

[facility]ity of [facility]

 

                POCT GLUCOSE (AUTOMATED) 2022 19:34:00 Rachel Bailey  Uni

versity of [facility]

 

                POCT GLUCOSE (AUTOMATED) 2022 19:34:00 Rachel Bailey  Uni

versity of [facility]

 

                POCT GLUCOSE (AUTOMATED) 2022 16:39:00 Rachel Bailey  Uni

versity of [facility]

 

                POCT GLUCOSE (AUTOMATED) 2022 16:39:00 Rachel Bailey  Uni

versity of [facility]

 

                BASIC METABOLIC PANEL 2022 09:13:00 Livan Lui  Univer

[facility] [place]



                (NA, K, CL, CO2, GLUCOSE,                                 Medica

l Branch



                BUN, CREATININE, CA)                                 

 

                BASIC METABOLIC PANEL 2022 09:13:00 Tabitha Kaylynn  Univer

[facility] [place]



                (NA, K, CL, CO2, GLUCOSE,                                 Medica

l Branch



                BUN, CREATININE, CA)                                 

 

                POCT GLUCOSE (AUTOMATED) 2022 01:45:00 Edionwe, Mercy  Uni

versity [facility]

 

                POCT GLUCOSE (AUTOMATED) 2022 01:45:00 Leo Mercy  Uni

versity of [facility]

 

                POCT GLUCOSE (AUTOMATED) 2022 21:36:00 Leo, Mercy  Uni

versity of [facility]

 

                POCT GLUCOSE (AUTOMATED) 2022 21:36:00 Leo Mercy  Uni

versity [facility]

 

                POCT GLUCOSE (AUTOMATED) 2022 16:45:00 Leo Mercy  Uni

versity [facility]

 

                POCT GLUCOSE (AUTOMATED) 2022 16:45:00 Leo Mercy  Bella

[facility]

 

                PHOSPHORUS      2022 13:17:00 Tabitha [facility]

 

                MAGNESIUM       2022 13:17:00 Tabitha [facility]

 

                BASIC METABOLIC PANEL 2022 13:17:00 Tabitha Kaylynn  Univer

[facility] [place]



                (NA, K, CL, CO2, GLUCOSE,                                 Medica

l Branch



                BUN, CREATININE, CA)                                 

 

                BASIC METABOLIC PANEL 2022 13:17:00 Tabitha Kaylynn  Univer

[facility] [place]



                (NA, K, CL, CO2, GLUCOSE,                                 Medica

l Branch



                BUN, CREATININE, CA)                                 

 

                MAGNESIUM       2022 13:17:00 [facility]

 

                PHOSPHORUS      2022 13:17:00 Tabitha [facility]

 

                POCT GLUCOSE (AUTOMATED) 2022 12:42:00 Leo [facility]  [facility]

 

                POCT GLUCOSE (AUTOMATED) 2022 12:42:00 Leo Mercy  [facility]

[facility]

 

                POCT GLUCOSE (AUTOMATED) 2022 01:08:00 Leo Mercy  Uni

versity [facility]

 

                POCT GLUCOSE (AUTOMATED) 2022 01:08:00 Leo Mercy  [facility]

[facility]

 

                URINALYSIS      2022 22:52:00 Tabitha [facility]

 

                URINE CULTURE   2022 22:52:00 Tabitha [facility]

 

                URINALYSIS      2022 22:52:00 Tabitha [facility]

 

                URINE CULTURE   2022 22:52:00 Tabitha [facility]

 

                POCT GLUCOSE (AUTOMATED) 2022 21:40:00 Edsofia, Mercy  [facility]ed

[facility]

 

                POCT GLUCOSE (AUTOMATED) 2022 21:40:00 Edionwe, Mercy  Hooked

versity [facility]

 

                POCT GLUCOSE (AUTOMATED) 2022 16:02:00 Edpapowe, Mercy  [facility]

[facility]

 

                POCT GLUCOSE (AUTOMATED) 2022 16:02:00 Leo, Mercy  [facility]

[facility]

 

                POCT GLUCOSE (AUTOMATED) 2022 13:08:00 Edsofia, Mercy  [facility]

[facility]

 

                POCT GLUCOSE (AUTOMATED) 2022 13:08:00 Edsofia Mercy  Hooked

versity [facility]

 

                POCT GLUCOSE (AUTOMATED) 2022 10:58:00 Edsofia, Mercy  [facility]

[facility]

 

                POCT GLUCOSE (AUTOMATED) 2022 10:58:00 Leo Mercy  Hooked

[facility]

 

                LACTIC ACID WHOLE BLOOD 2022 09:47:00 Refugio Moran [facility]

 

                LACTIC ACID WHOLE BLOOD 2022 09:47:00 Refugio Moran [facility]

 

                PHOSPHORUS      2022 09:46:00 Refugio Moran [facility]

 

                MAGNESIUM       2022 09:46:00 Refugio Moran [facility]

 

                COMP. METABOLIC PANEL 2022 09:46:00 Refugio Moran [facility]



                ([id number])                                         [facility]

 

                CBC WITH DIFF   2022 09:46:00 Refugio Moran [facility]

 

                CBC WITH DIFF   2022 09:46:00 Refugio Moran [facility]

 

                COMP. METABOLIC PANEL 2022 09:46:00 Refugio Moran [facility]



                ([id number])                                         Medical Branch

 

                MAGNESIUM       2022 09:46:00 Refugio Moran [facility]

 

                PHOSPHORUS      2022 09:46:00 Refugio Moran [facility]

 

                POCT GLUCOSE (AUTOMATED) 2022 07:47:00 Leo Mercy  Uni

[facility]

 

                POCT GLUCOSE (AUTOMATED) 2022 07:47:00 Edsofia Mercy  Hooked

[facility]

 

                POCT GLUCOSE (AUTOMATED) 2022 03:40:00 Edsofia Mercy  Hooked

[facility]

 

                POCT GLUCOSE (AUTOMATED) 2022 03:40:00 Leo Mercy  Hooked

[facility]

 

                POCT GLUCOSE (AUTOMATED) 2022 00:43:00 Leo Mercy  Hooked

[facility]

 

                POCT GLUCOSE (AUTOMATED) 2022 00:43:00 Leo Mercy  Hooked

[facility]

 

                BASIC METABOLIC PANEL 2022 23:29:00 Refugio Moran [facility]



                (NA, K, CL, CO2, GLUCOSE,                                 Medica

l Branch



                BUN, CREATININE, CA)                                 

 

                BASIC METABOLIC PANEL 2022 23:29:00 Refugio Moran [facility]



                (NA, K, CL, CO2, GLUCOSE,                                 Medica

l Branch



                BUN, CREATININE, CA)                                 

 

                POCT GLUCOSE (AUTOMATED) 2022 22:28:00 Leo Mercy  Hooked

[facility]

 

                POCT GLUCOSE (AUTOMATED) 2022 22:28:00 Rachel Bailey  Hooked

[facility]

 

                US RETROPERITONEAL 2022 22:27:00 Refugio Moran [facility]

 

                US RETROPERITONEAL 2022 22:27:00 Refugio Moran [facility]



                COMPLETE                                        [facility]

 

                CT ABDOMEN PELVIS WO 2022 20:05:00 Refugio Moran [facility]



                CONTRAST                                        Medical Branch

 

                CT ABDOMEN PELVIS WO 2022 20:05:00 Refugio Moran [facility]



                CONTRAST                                        [facility]

 

                POCT GLUCOSE (AUTOMATED) 2022 19:07:00 LeoRachel  [facility]

[facility]

 

                POCT GLUCOSE (AUTOMATED) 2022 19:07:00 Edsofia [facility]  Hook[facility]

[facility]

 

                POCT GLUCOSE (AUTOMATED) 2022 18:00:00 Edsofia [facility]  [facility]

[facility]

 

                POCT GLUCOSE (AUTOMATED) 2022 18:00:00 Leo Mercy  Hooked

[facility]

 

                PROTEIN CREAT RATIO URINE 2022 17:45:00 Corby Peña 

[facility]



                RANDOM                                          [facility]

 

                PROTEIN CREAT RATIO URINE 2022 17:45:00 Corby Peña 

[facility]



                RANDOM                                          [facility]

 

                POCT GLUCOSE (AUTOMATED) 2022 17:25:00 Leo Mercy  Hooked

[facility]

 

                POCT GLUCOSE (AUTOMATED) 2022 17:25:00 Leo Mercy  Hooked

[facility]

 

                LACTIC ACID WHOLE BLOOD 2022 17:23:00 Refugio Moran [facility]

 

                LACTIC ACID WHOLE BLOOD 2022 17:23:00 Refugio Moran [facility]

 

                BASIC METABOLIC PANEL 2022 17:22:00 Refugio Moran [facility]



                (NA, K, CL, CO2, GLUCOSE,                                 Medica

l Branch



                BUN, CREATININE, CA)                                 

 

                BASIC METABOLIC PANEL 2022 17:22:00 Refugio Moran [facility]



                (NA, K, CL, CO2, GLUCOSE,                                 Medica

l Branch



                BUN, CREATININE, CA)                                 

 

                POCT GLUCOSE (AUTOMATED) 2022 16:07:00 Leo [facility]  [facility]

 

                POCT GLUCOSE (AUTOMATED) 2022 16:07:00 Edionwe Mercy  Hooked

[facility]

 

                POCT GLUCOSE (AUTOMATED) 2022 15:04:00 Edionwe, Mercy  Hooked

[facility]

 

                POCT GLUCOSE (AUTOMATED) 2022 15:04:00 Edsofia Mercy  Hooked

[facility]

 

                POCT GLUCOSE (AUTOMATED) 2022 13:57:00 Leo Mercy  Hooked

[facility]

 

                POCT GLUCOSE (AUTOMATED) 2022 13:57:00 Leo Mercy  Hooked

[facility]

 

                URINE CULTURE   2022 13:50:00 [facility]

 

                URINE CULTURE   2022 13:50:00 [facility]

 

                LACTIC ACID WHOLE BLOOD 2022 12:57:00 Edsofia [facility]

 

                LACTIC ACID WHOLE BLOOD 2022 12:57:00 Leo [facility]

 

                POCT GLUCOSE (AUTOMATED) 2022 12:55:00 Edsofia Mercy  Hooked

[facility]

 

                POCT GLUCOSE (AUTOMATED) 2022 12:55:00 Edsofia Mercy  Hooked

[facility]

 

                BASIC METABOLIC PANEL 2022 12:51:00 Leo [facility]



                (NA, K, CL, CO2, GLUCOSE,                                 Medica

l Branch



                BUN, CREATININE, CA)                                 

 

                BASIC METABOLIC PANEL 2022 12:51:00 Leo [facility]



                (NA, K, CL, CO2, GLUCOSE,                                 Medica

l Branch



                BUN, CREATININE, CA)                                 

 

                MRSA / MSSA SCREEN BY 2022 10:37:00 Edsofia [employer]  [facility]



                PCR, [facility] [place]

 

                MRSA / MSSA SCREEN BY 2022 10:37:00 Edsofia [facility]



                PCR, [facility] [place]

 

                URINE DRUG (IMMUNOASSAY) 2022 10:36:00 Leo Mercy  Hooked

[facility]



                SCREEN                                          

 

                LACTIC ACID WHOLE BLOOD 2022 10:36:00 Edsofia [facility]

 

                URINE DRUG (LCMSMS) - 2022 [facility]:36:00 [facility]



                SYNTHETIC OPIATES PANEL                                 [facility]

 

                LACTIC ACID WHOLE BLOOD 2022 [facility]:36[facility]00 [facility]

 

                URINE DRUG (IMMUNOASSAY) [facility]22 [facility]:36:[facility]



                SCREEN                                          

 

                URINE DRUG (LCMSMS) - 2022 10:36:00 [facility]



                OPIATES PANEL                                   [facility]

 

                PHOSPHORUS      2022 08:58:00 [facility]

 

                MAGNESIUM       2022 08:58:00 [facility]

 

                BETA HYDROXY-BUTYRATE 2022 08:58:00 Ashly Ortega [facility]

 

                BASIC METABOLIC PANEL 2022 08:58:00 [facility]



                (NA, K, CL, CO2, GLUCOSE,                                 Medica

l Branch



                BUN, CREATININE, CA)                                 

 

                BETA HYDROXY-BUTYRATE 2022 08:58:00 Ashly Ortega  [facility]

 

                BASIC METABOLIC PANEL 2022 08:58:00 [facility]



                (NA, K, CL, CO2, GLUCOSE,                                 Medica

l Branch



                BUN, CREATININE, CA)                                 

 

                MAGNESIUM       2022 08:58:00 Leo [facility]

 

                PHOSPHORUS      2022 08:58:00 [facility]

 

                CBC WITH DIFF   2022 06:40:00 [facility]

 

                LACTIC ACID WHOLE BLOOD 2022 06:40:00 [facility]

 

                LACTIC ACID WHOLE BLOOD 2022 06:40:00 [facility]

 

                CBC WITH DIFF   2022 06:40:00 [facility]

 

                POCT GLUCOSE (AUTOMATED) 2022 05:14:00 LeoSwapnay  Bella

[facility]

 

                POCT GLUCOSE (AUTOMATED) 2022 05:14:00 Rachel Bailey  [facility]

 

                ED BLADDER      2022 04:35:00 Ashly Ortega  [facility]

 

                ED BLADDER      2022 04:35:00 Ashly Ortega  [facility]



                CATHETERIZATION                                 [facility]

 

                POCT GLUCOSE (AUTOMATED) 2022 03:51:00 Leo, Mercy  [facility]

 

                POCT GLUCOSE (AUTOMATED) 2022 03:51:00 Rachel Bailey  [facility]

 

                XR CHEST 1 VW   2022 02:43:07 Ashly Ortega  [facility] [facility]

 

                XR ANKLE 3+ VW LEFT 2022 02:43:07 Ashly Ortega  [facility]

 

                XR CHEST 1 VW   2022 02:43:07 Ashly Ortega  [facility]

 

                XR ANKLE 3+ VW LEFT 2022 02:43:07 Ashly Ortega  [facility]

 

                LACTIC ACID WHOLE BLOOD 2022 02:30:00 Ashly Ortega  [facility]

 

                LACTIC ACID WHOLE BLOOD 2022 02:30:00 Ashly Ortega  [facility]

 

                URINALYSIS      2022 01:38:00 Ashly Ortega  [facility]

 

                URINALYSIS      2022 01:38:00 Ashly Ortega  [facility]

 

                POCT GLUCOSE (AUTOMATED) 2022 01:36:00 Ashly Ortega  [facility]

 

                POCT GLUCOSE (AUTOMATED) 2022 01:36:00 Ashly Ortega  [facility]

 

                PHOSPHORUS      2022 01:24:00 Ashly Ortega  [facility]

 

                LIPASE          2022 01:24:00 Ashly Ortega  [facility]

 

                MAGNESIUM       2022 01:24:00 Ashly Ortega  [facility]

 

                TROPONIN I      2022 01:24:00 Ashly Ortega  [facility]

 

                COMP. METABOLIC PANEL 2022 01:24:00 Ashly Ortega  [facility]



                ([id number])                                         [facility]

 

                SALICYLATE      2022 01:24:00 Ashly Ortega  [facility]

 

                ETHANOL         2022 01:24:00 Ashly Ortega  [facility]

 

                COMP. METABOLIC PANEL 2022 01:24:00 Ashly Ortega  [facility]



                ([id number])                                         [facility]

 

                LIPASE          2022 01:24:00 Ashly Ortega  [facility]

 

                TROPONIN I      2022 01:24:00 Ashly Ortega  [facility]

 

                ETHANOL         2022 01:24:00 Ashly Ortega  [facility]

 

                ACETAMINOPHEN   2022 01:24:00 Ashly Ortega  [facility]

 

                PHOSPHORUS      2022 01:24:00 Ashly Ortega  [facility]

 

                MAGNESIUM       2022 01:24:00 Ashly Ortega  [facility]

 

                CT LUMBAR SPINE WO 2022 01:16:52 Ashly Ortega  [facility]



                CONTRAST                                        [facility]

 

                CT THORACIC SPINE WO 2022 01:16:52 Ashly Ortega  [facility]



                CONTRAST                                        [facility]

 

                CT THORACIC SPINE WO 2022 01:16:52 Ashly Ortega  [facility]



                CONTRAST                                        [facility]

 

                CT LUMBAR SPINE WO 2022 01:16:52 Ashly Ortega  [facility]



                CONTRAST                                        [facility]

 

                CT CERVICAL SPINE WO 2022 01:16:24 Ashly Ortega  [facility]



                CONTRAST                                        [facility]

 

                CT HEAD WO CONTRAST 2022 01:16:24 Ashly Ortega  [facility]

 

                CT HEAD WO CONTRAST 2022 01:16:24 Ashly Ortega  [facility]

 

                CT CERVICAL SPINE WO 2022 01:16:24 Ashly Ortega  [facility]



                CONTRAST                                        [facility]

 

                BLOOD CULTURE SCREEN 2022 00:41:00 Ashly Ortega  [facility]

 

                BLOOD CULTURE SCREEN 2022 00:41:00 Ashly Ortega  [facility]

 

                COVID-19 (ID NOW RAPID 2022 00:30:00 Ashly Ortega  [facility]



                TESTING)                                        Medical Branch

 

                LAB ONLY COVID  2022 00:30:00 Ashly Ortega  [facility]

 

                COVID-19 (ID NOW RAPID 2022 00:30:00 Ashly Ortega  [facility]



                TESTING)                                        Medical Branch

 

                LAB ONLY COVID  2022 00:30:00 Ashly Ortega  [facility]

 

                POCT GLUCOSE (AUTOMATED) 2022 00:05:00 Ashly Ortega  [facility]

 

                POCT GLUCOSE (AUTOMATED) 2022 00:05:00 Ashly Ortega  [facility]

 

                ACUTE CARE VENOUS BLOOD 2022 23:53:00 Ashly Ortega  [facility]

 

                ACUTE CARE VENOUS BLOOD 2022 23:53:00 Ashly Ortega  [facility]

 

                CBC WITH DIFF   2022 23:48:00 Ashly Ortega  [facility]

 

                CBC WITH DIFF   2022 23:48:00 Ashly Ortega  [facility]

 

                LACTIC ACID WHOLE BLOOD 2022 23:47:00 Ashly Ortega  [facility]

 

                LACTIC ACID WHOLE BLOOD 2022 23:47:00 Ashly Ortega  [facility]

 

                HB ECG ROUTINE & RHYTHM 2022 23:31:40 Ashly Ortega  [facility]

 

                HB ECG ROUTINE & RHYTHM 2022 23:31:40 Ashly Ortega  [facility]

 

                EMERGENCY DEPARTMENT 2022 05:01:00 Doctor Unassigned, No U

[facility]



                DOCUMENTS                       Name            [facility]

 

                EMERGENCY DEPARTMENT 2022 05:01:00 Doctor Unassigned, No U

[facility]



                DOCUMENTS                       Name            [facility]

 

                HOSPITAL ADMISSION 2022 05:01:00 Doctor Unassigned, No Uni

[facility]

 

                CT FEMUR LEFT W CONTRAST 2022 02:34:07 [facility]

 

                [facility] FEM[facility] LEFT W CONTRAST 2022 02:34:07 [facility]

 

                POCT GLUCOSE(AGE >30DAYS) 2022 01:30:00 [facility]

 

                POCT GLUCOSE(AGE >30DAYS) 2022 01:30:00 [facility]

 

                POCT GLUCOSE (AUTOMATED) 2022 01:28:00 Alondra Santos [facility]

 

                POCT GLUCOSE (AUTOMATED) 2022 01:28:00 Alondra Santos [facility]

 

                POCT GLUCOSE (AUTOMATED) 2022 00:38:00 Alondra Santos [facility]

 

                POCT GLUCOSE (AUTOMATED) 2022 00:38:00 Alondra Santos [facility]

 

                URINALYSIS      2022-07-15 23:58:00 Tom [facility]

 

                URINALYSIS      2022-07-15 23:58:00 Alondra Santos [facility]

 

                POCT GLUCOSE (AUTOMATED) 2022-07-15 23:26:00 Alondra Santos [facility]

 

                POCT GLUCOSE (AUTOMATED) 2022-07-15 23:26:00 Alondra Santos [facility]

 

                BASIC METABOLIC PANEL 2022-07-15 22:24:00 Alondra Santos [facility]



                (NA, K, CL, CO2, GLUCOSE,                                 Medica

l Branch



                BUN, CREATININE, CA)                                 

 

                CBC WITH DIFF   2022-07-15 22:24:00 Tom [facility]

 

                COVID-19 (ID NOW RAPID 2022-07-15 22:24:00 Alondra Santos Uni

versity [facility]



                TESTING)                                        Medical Branch

 

                CBC WITH DIFF   2022-07-15 22:24:00 Alondra Santos [facility]

 

                BASIC METABOLIC PANEL 2022-07-15 22:24:00 Alondra Santos [facility]



                (NA, K, CL, CO2, GLUCOSE,                                 Medica

l Branch



                BUN, CREATININE, CA)                                 

 

                COVID-19 (ID NOW RAPID 2022-07-15 22:24:00 Alondra Santos [facility]



                TESTING)                                        Medical Branch

 

                LAB ONLY COVID  2022-07-15 22:24:00 Tom [facility]



                INTERPRETATION                                  [facility]

 

                EMERGENCY SERVICES 2022-07-15 05:01:00 Doctor Unassigned, No Uni

[facility] [place]



                AGREEMENTS AND                  Name            [facility]



                AUTHORIZATIONS                                  

 

                EMERGENCY DEPARTMENT 2022-07-15 05:01:00 Doctor Unassigned, No U

niversity [facility]



                DOCUMENTS                       Name            Medical Branch

 

                LIPASE          2022 10:37:00 Radha Fofana [facility]

 

                COMP. METABOLIC PANEL 2022 10:37:00 Radha Fofana [facility]



                ([id number])                                         [facility]

 

                CBC WITH DIFF   2022 10:37:00 Brigido Radha [facility]

 

                AC PANEL 21 + LACTIC ACID 2022 10:37:00 Radha Fofana

[facility]

 

                CBC WITH DIFF   2022 10:37:00 Radha Fofana [facility]

 

                COMP. METABOLIC PANEL 2022 10:37:00 Radha Fofana [facility]



                ([id number])                                         [facility]

 

                AC PANEL 21 + LACTIC ACID 2022 10:37:00 Radha Fofana

[facility]

 

                LIPASE          2022 10:37:00 Radha Fofana [facility]

 

                URINALYSIS      2022 10:24:00 Radha Fofana [facility]

 

                URINALYSIS      2022 10:24:00 Radha Fofana [facility]

 

                EMERGENCY SERVICES 2022 05:01:00 Doctor Unassigned, No Uni

[facility] [place]



                AGREEMENTS AND                  Name            Medical Branch



                AUTHORIZATIONS                                  

 

                POCT GLUCOSE (AUTOMATED) 2022 22:53:00 Radha Fofana Uni

[facility] [place]



                                                                [facility]

 

                POCT GLUCOSE (AUTOMATED) 2022 22:53:00 Radha Fofana Uni

[facility] [place]



                                                                Medical [facility]

 

                POCT GLUCOSE (AUTOMATED) 2022 12:17:00 Radha Fofana Uni

[facility] [place]



                                                                Medical [facility]

 

                POCT GLUCOSE (AUTOMATED) 2022 12:17:00 Radha Fofana Uni

[facility] [place]



                                                                [facility]

 

                POCT GLUCOSE (AUTOMATED) 2022 04:47:00 Radha Fofana Uni

[facility] [place]



                                                                [facility] Branch

 

                POCT GLUCOSE (AUTOMATED) 2022 04:47:00 Radha Fofana Uni

[facility] [place]



                                                                [facility] Branch

 

                POCT GLUCOSE (AUTOMATED) 2022 01:43:00 Radha Fofana Uni

[facility] [place]



                                                                [facility]

 

                POCT GLUCOSE (AUTOMATED) 2022 01:43:00 Radha Fofana Uni

[facility] [place]



                                                                Medical Branch

 

                POCT GLUCOSE (AUTOMATED) 2022 21:51:00 Radha Fofana Uni

[facility] [place]



                                                                Medical Branch

 

                POCT GLUCOSE (AUTOMATED) 2022 21:51:00 Radha Fofana Uni

[facility] [place]



                                                                Medical [facility]

 

                POCT GLUCOSE (AUTOMATED) 2022 17:04:00 Radha Fofana Uni

[facility] [place]



                                                                Medical Branch

 

                POCT GLUCOSE (AUTOMATED) 2022 17:04:00 Radha Fofana Uni

[facility] [place]



                                                                Medical Branch

 

                POCT GLUCOSE (AUTOMATED) 2022 13:09:00 Radha Fofana Uni

[facility] [place]



                                                                Medical Branch

 

                POCT GLUCOSE (AUTOMATED) 2022 13:09:00 Radha Fofana Uni

[facility] [place]



                                                                Medical Branch

 

                POCT GLUCOSE (AUTOMATED) 2022 08:32:00 Radha Fofana Uni

[facility] [place]



                                                                Medical Branch

 

                POCT GLUCOSE (AUTOMATED) 2022 08:32:00 Radha Fofana Uni

[facility] [place]



                                                                Medical Branch

 

                POCT GLUCOSE (AUTOMATED) 2022 05:45:00 Radha Fofana Uni

[facility] [place]



                                                                Medical Branch

 

                POCT GLUCOSE (AUTOMATED) 2022 05:45:00 Radha Fofana

[facility] of [facility]

 

                POCT GLUCOSE (AUTOMATED) 2022 02:43:00 Radha Fofana

versity of [facility]

 

                POCT GLUCOSE (AUTOMATED) 2022 02:43:00 Radha Fofana

[facility] of [facility]

 

                POCT GLUCOSE (AUTOMATED) 2022 22:37:00 Radha Fofana

[facility] of [facility]

 

                POCT GLUCOSE (AUTOMATED) 2022 22:37:00 Radha Fofana

[facility] of [facility]

 

                POCT GLUCOSE (AUTOMATED) 2022 18:04:00 Radha Fofana

[facility] of [facility]

 

                POCT GLUCOSE (AUTOMATED) 2022 18:04:00 Radha Fofana

[facility] of [facility]

 

                BASIC METABOLIC PANEL 2022 14:22:00 [facility]



                (NA, K, CL, CO2, GLUCOSE,                                 Medica

l Branch



                BUN, CREATININE, CA)                                 

 

                BASIC METABOLIC PANEL 2022 14:22:00 [facility]



                (NA, K, CL, CO2, GLUCOSE,                                 Medica

l Branch



                BUN, CREATININE, CA)                                 

 

                POCT GLUCOSE (AUTOMATED) 2022 13:30:00 Radha Fofana

[facility] of [facility]

 

                POCT GLUCOSE (AUTOMATED) 2022 13:30:00 Radha Fofana

[facility] of [facility]

 

                CRITICAL CARE   2022 11:11:00 Radha Fofana o

[facility]

 

                CRITICAL CARE   2022 11:11:00 Radha Fofana [facility]

 

                POCT GLUCOSE (AUTOMATED) 2022 11:01:00 Radha Fofana

[facility] of [facility]

 

                POCT GLUCOSE (AUTOMATED) 2022 11:01:00 Radha Fofana

[facility] of [facility]

 

                POCT GLUCOSE (AUTOMATED) 2022 07:19:00 Radha Fofana

[facility] of [facility]

 

                POCT GLUCOSE (AUTOMATED) 2022 07:19:00 Radha Fofana

[facility] of [facility]

 

                BASIC METABOLIC PANEL 2022 05:53:00 Radha Fofana [facility]



                (NA, K, CL, CO2, GLUCOSE,                                 Medica

l Branch



                BUN, CREATININE, CA)                                 

 

                BASIC METABOLIC PANEL 2022 05:53:00 Radha Fofana [facility]



                (NA, K, CL, CO2, GLUCOSE,                                 Medica

l Branch



                BUN, CREATININE, CA)                                 

 

                POCT GLUCOSE (AUTOMATED) 2022 04:41:00 Radha Fofana [facility]

 

                POCT GLUCOSE (AUTOMATED) 2022 04:41:00 Radha Fofana [facility]

 

                URINALYSIS      2022 03:59:00 Radha Fofana [facility]

 

                URINE CULTURE   2022 03:59:00 Radha Fofana [facility]

 

                URINALYSIS      2022 03:59:00 Radha Fofana [facility]

 

                URINE CULTURE   2022 03:59:00 Radha Fofana [facility]

 

                POCT GLUCOSE (AUTOMATED) 2022 03:48:00 Radha Fofana [facility]

 

                POCT GLUCOSE (AUTOMATED) 2022 03:48:00 Radha Fofana [facility]

 

                AC PANEL 21 + LACTIC ACID 2022 02:23:00 Radha Fofana [facility]

 

                AC PANEL 21 + LACTIC ACID 2022 02:23:00 Radha Fofana [facility]

 

                LIPASE          2022 02:22:00 Radha Fofana [facility]

 

                COMP. METABOLIC PANEL 2022 02:22:00 Radha Fofana [facility]



                ([id number])                                         Medical Branch

 

                CBC WITH DIFF   2022 02:22:00 Radha Fofana [facility]

 

                COVID-19 (ID NOW RAPID 2022 02:22:00 Radha Fofana [facility]



                TESTING)                                        Medical Branch

 

                LAB ONLY COVID  2022 02:22:00 Radha Fofana [facility]



                INTERPRETATION                                  [facility]

 

                CBC WITH DIFF   2022 02:22:00 Radha Fofana [facility]

 

                COMP. METABOLIC PANEL 2022 02:22:00 Radha Fofana [facility]



                ([id number])                                         Medical Branch

 

                LIPASE          2022 02:22:00 Radha Fofana [facility]

 

                COVID-19 (ID NOW RAPID 2022 02:22:00 Radha Fofana [facility]



                TESTING)                                        Medical Branch

 

                LAB ONLY COVID  2022 02:22:00 Radha Fofana [facility]



                INTERPRETATION                                  [facility]

 

                HOSPITAL ADMISSION 2022 05:01:00 Doctor Unassigned, No Uni

[facility]

 

                EMERGENCY DEPARTMENT 2022 05:01:00 Doctor Unassigned, No U

[facility] 

[place]



                DOCUMENTS                       [facility]

 

                HOSPITAL ADMISSION 2022 05:01:00 Doctor Unassigned, No Uni

[facility] of [facility]

 

                POCT GLUCOSE (AUTOMATED) 2022 17:03:00 Edwardo Lopez   [facility]

 

                POCT GLUCOSE (AUTOMATED) 2022 16:09:00 Edwardo Lopez   [facility]

[facility]

 

                HEPATIC FUNCTION PANEL 2022 09:01:00 Ashly Lees [facility]



                ([id number]) (ALB,T.PRO,BILI                                 Medical 

Branch



                T,BU/BC,ALT,AST,ALK PHOS)                                 

 

                BASIC METABOLIC PANEL 2022 09:01:00 [facility] 

[facility]



                (NA, K, CL, CO2, GLUCOSE,                                 Medica

l Branch



                BUN, CREATININE, CA)                                 

 

                CBC WITH DIFF   2022 09:01:00 Ashly Lees [facility]

 

                CBC WITH DIFF   2022 09:01:00 Ashly Lees [facility]

 

                BASIC METABOLIC PANEL 2022 09:01:00 [facility]es 

[facility]



                (NA, K, CL, CO2, GLUCOSE,                                 Medica

l Branch



                BUN, CREATININE, CA)                                 

 

                HEPATIC FUNCTION PANEL 2022 09:01:00 Ashly Lees [facility]



                ([id number]) (ALB,T.PRO,BILI                                 Medical 

Branch



                T,BU/BC,ALT,AST,ALK PHOS)                                 

 

                POCT GLUCOSE (AUTOMATED) 2022 08:58:00 Edwardo Lopez   [facility]

 

                POCT GLUCOSE (AUTOMATED) 2022 06:53:00 Edwardo Lopez   [facility]

 

                POCT GLUCOSE (AUTOMATED) 2022 05:31:00 Edwardo Lopez   [facility]

 

                POCT GLUCOSE (AUTOMATED) 2022 02:16:00 Edwardo Lopez   [facility]

 

                POCT GLUCOSE (AUTOMATED) 2022 21:50:00 Edwardo Lopez

[facility]

 

                BASIC METABOLIC PANEL 2022 20:45:00 [facility]



                (NA, K, CL, CO2, GLUCOSE,                                 Medica

l Branch



                BUN, CREATININE, CA)                                 

 

                CBC WITH DIFF   2022 [facility]:45:[facility]

 

                BASIC METABOLIC PANEL 2022 20:45:00 [facility]



                (NA, K, CL, CO2, GLUCOSE,                                 Medica

l Branch



                BUN, CREATININE, CA)                                 

 

                CBC WITH DIFF   2022 20:45:00 [facility]

 

                POCT GLUCOSE (AUTOMATED) 2022 16:44:00 Edwardo Lopez

[facility]

 

                POCT GLUCOSE (AUTOMATED) 2022 13:41:00 Edwardo Lopez   [facility]

 

                URINE CULTURE   2022 06:41:00 Josse Giles [facility]

 

                URINE CULTURE   2022 06:41:00 Josse Giles [facility]

 

                POCT GLUCOSE (AUTOMATED) 2022 06:10:00 Josse Giles [facility]

 

                CT ABDOMEN PELVIS W 2022 05:08:00 Josse Giles [facility]

 

                CT ABDOMEN PELVIS W 2022 05:08:00 Josse Giles [facility]

 

                URINALYSIS      2022 04:34:00 Josse Giles [facility]

 

                COVID-19 (ID NOW RAPID 2022 04:34:00 Josse Giles [facility]



                TESTING)                                        Medical Branch

 

                LAB ONLY COVID  2022 04:34:00 Josse Giles [facility]

 

                URINALYSIS      2022 04:34:00 Josse Giles [facility]

 

                COVID-19 (ID NOW RAPID 2022 04:34:00 Josse Giles [facility]



                TESTING)                                        Medical Branch

 

                LAB ONLY COVID  2022 04:34:00 Josse Giles [facility]



                INTERPRETATION                                  [facility]

 

                LIPASE          2022 03:57:00 Josse Giles [facility]

 

                COMP. METABOLIC PANEL 2022 03:57:00 Josse Giles [facility]



                ([id number])                                         [facility]

 

                CBC WITH DIFF   2022 03:57:00 Josse Giles [facility]

 

                CBC WITH DIFF   2022 03:57:00 Josse Giles [facility]

 

                COMP. METABOLIC PANEL 2022 03:57:00 Josse Giles [facility]



                ([id number])                                         [facility]

 

                LIPASE          2022 03:57:00 Josse Giles [facility]

 

                EMERGENCY DEPARTMENT 2022 05:01:00 Doctor Unassigned, No U

[facility]



                [facility]                       [facility]

 

                HOSPITAL ADMISSION 2022 05:01:00 Doctor Unassigned, No Uni

versity of [facility]

 

                POCT GLUCOSE (AUTOMATED) 2022-06-10 21:48:00 Rachel [facility]  [facility]

 

                POCT GLUCOSE (AUTOMATED) 2022-06-10 17:03:00 Rachel Bailey  Uni

versity [facility]

 

                POCT GLUCOSE (AUTOMATED) 2022-06-10 17:03:00 Rachel Bailey  [facility]

 

                POCT GLUCOSE (AUTOMATED) 2022-06-10 12:44:00 Rachel Bailey

versity of [facility]

 

                POCT GLUCOSE (AUTOMATED) 2022-06-10 12:44:00 EdRachel black  Uni

versity of [facility]

 

                BASIC METABOLIC PANEL 2022-06-10 10:29:00 Edwardo Lopez   [facility]



                (NA, K, CL, CO2, GLUCOSE,                                 Medica

l Branch



                BUN, CREATININE, CA)                                 

 

                CBC WITH DIFF   2022-06-10 10:29:00 [facility]

 

                MAGNESIUM       2022-06-10 10:29:00 [facility]

 

                MAGNESIUM       2022-06-10 10:29:00 [facility]

 

                BASIC METABOLIC PANEL 2022-06-10 10:29:00 Edwardo Lopez   [facility]



                (NA, K, CL, CO2, GLUCOSE,                                 Medica

l Branch



                BUN, CREATININE, CA)                                 

 

                CBC WITH DIFF   2022-06-10 10:29:00 [facility]

 

                POCT GLUCOSE (AUTOMATED) 2022-06-10 10:28:00 Edsofia Mercy  Uni

versity of [facility]

 

                POCT GLUCOSE (AUTOMATED) 2022-06-10 10:28:00 Edpapowe Mercy  Uni

versity of [facility]

 

                POCT GLUCOSE (AUTOMATED) 2022-06-10 05:33:00 Edionwe Mercy  Uni

versity of [place]



                                                                Medical Branch

 

                POCT GLUCOSE (AUTOMATED) 2022-06-10 05:33:00 Edionwe Mercy  Uni

[facility]ity of [place]



                                                                Medical Branch

 

                POCT GLUCOSE (AUTOMATED) 2022-06-10 04:37:00 Edionwe Mercy  Uni

[facility]ity of [place]



                                                                Medical Branch

 

                POCT GLUCOSE (AUTOMATED) 2022-06-10 04:37:00 Edionwe, Mercy  Uni

[facility]ity of [place]



                                                                Medical Branch

 

                POCT GLUCOSE (AUTOMATED) 2022-06-10 02:29:00 Edionwe Mercy  Uni

[facility]ity [facility] [place]



                                                                Medical Branch

 

                POCT GLUCOSE (AUTOMATED) 2022-06-10 02:29:00 Edionwe, Mercy  Uni

[facility] [place]



                                                                Medical Branch

 

                POCT GLUCOSE (AUTOMATED) 2022-06-10 00:52:00 Edionwe Mercy  Uni

[facility]ity of [place]



                                                                Medical Branch

 

                POCT GLUCOSE (AUTOMATED) 2022-06-10 00:52:00 Edionwe, Mercy  Uni

[facility]

 

                POCT GLUCOSE (AUTOMATED) 2022 21:52:00 Bishoppaposhraddha, Mercy  Uni

[facility]

 

                POCT GLUCOSE (AUTOMATED) 2022 21:52:00 Bishoppaposhraddha, Mercy  Uni

[facility]

 

                POCT GLUCOSE (AUTOMATED) 2022 16:42:00 Leo, Mercy  [facility]

 

                POCT GLUCOSE (AUTOMATED) 2022 16:42:00 Leo Mercy  [facility]

 

                XR CHEST 1 VW   2022 14:05:00 [facility]

 

                XR SKULL <4 VW  2022 14:05:00 [facility]

 

                XR ABDOMEN 2 VW 2022 14:05:00 [facility]

 

                XR ABDOMEN 2 VW 2022 14:05:00 [facility]

 

                XR CHEST 1 VW   2022 14:05:00 [facility]

 

                XR SKULL <4 VW  2022 14:05:00 [facility]

 

                POCT GLUCOSE (AUTOMATED) 2022 12:47:00 Leo, Mercy  [facility]

 

                POCT GLUCOSE (AUTOMATED) 2022 12:47:00 Leo [facility]  [facility]

 

                POCT GLUCOSE (AUTOMATED) 2022 08:59:00 Leo [facility]  [facility]

 

                POCT GLUCOSE (AUTOMATED) 2022 08:59:00 Leo [facility]

 

                GLYCOSYLATED HEMOGLOBIN 2022 08:56:00 Refugio Moran [facility]



                (A1C)                                           [facility]

 

                CBC WITH DIFF   2022 08:56:00 [facility]

 

                BASIC METABOLIC PANEL 2022 08:56:00 Refugio Moran [facility]



                (NA, K, CL, CO2, GLUCOSE,                                 Medica

l Branch



                BUN, CREATININE, CA)                                 

 

                MAGNESIUM       2022 08:56:00 [facility]

 

                PHOSPHORUS      2022 08:56:00 [facility]

 

                PHOSPHORUS      2022 08:56:00 [facility]

 

                MAGNESIUM       2022 08:56:00 [facility]

 

                BASIC METABOLIC PANEL 2022 08:56:00 Refugio Moran [facility]



                (NA, K, CL, CO2, GLUCOSE,                                 Medica

l Branch



                BUN, CREATININE, CA)                                 

 

                CBC WITH DIFF   2022 08:56:00 [facility]

 

                GLYCOSYLATED HEMOGLOBIN 2022 08:56:00 Dean [facility]



                (A1C)                                           [facility]

 

                POCT GLUCOSE (AUTOMATED) 2022 04:27:00 Rachel Bailey  Uni

versity of [facility]

 

                POCT GLUCOSE (AUTOMATED) 2022 04:27:00 Rachel Bailey  Uni

versity of [facility]

 

                POCT GLUCOSE (AUTOMATED) 2022 01:11:00 Rachel Bailey  Uni

versity of [facility]

 

                POCT GLUCOSE (AUTOMATED) 2022 01:11:00 Rachel Bailey  Uni

versity of [facility]

 

                POCT GLUCOSE (AUTOMATED) 2022 21:02:00 Rachel Bailey  Uni

versity of [facility]

 

                POCT GLUCOSE (AUTOMATED) 2022 21:02:00 Swapna Baileyy  Uni

versity of [facility]

 

                POCT GLUCOSE (AUTOMATED) 2022 16:08:00 Rachel Bailey  Uni

versity of [facility]

 

                POCT GLUCOSE (AUTOMATED) 2022 16:08:00 Rachel Bailey  Uni

versity of [facility]

 

                POCT GLUCOSE (AUTOMATED) 2022 12:39:00 Rachel Bailey  Uni

versity of [facility]

 

                POCT GLUCOSE (AUTOMATED) 2022 12:39:00 [facility]

 

                LACTIC ACID WHOLE BLOOD 2022 09:25:00 [facility]

 

                CBC WITH DIFF   2022 09:25:00 Leo [facility]

 

                BASIC METABOLIC PANEL 2022 09:25:00 Leo [facility]



                (NA, K, CL, CO2, GLUCOSE,                                 Medica

l Branch



                BUN, CREATININE, CA)                                 

 

                BASIC METABOLIC PANEL 2022 09:25:00 Leo [facility]



                (NA, K, CL, CO2, GLUCOSE,                                 Medica

l Branch



                BUN, CREATININE, CA)                                 

 

                CBC WITH DIFF   2022 09:25:00 Leo [facility]

 

                LACTIC ACID WHOLE BLOOD 2022 09:25:00 Octavio [facility]

 

                POCT GLUCOSE (AUTOMATED) 2022 08:56:00 Leo [facility]

 

                POCT GLUCOSE (AUTOMATED) 2022 08:56:00 Leo [facility]

 

                POCT GLUCOSE (AUTOMATED) 2022 04:51:00 Edsofia [facility]

 

                POCT GLUCOSE (AUTOMATED) 2022 04:51:00 Leo [facility]

 

                URINALYSIS      2022 02:47:00 Octavio [facility]

 

                URINALYSIS      2022 02:47:00 [facility]

 

                POCT GLUCOSE (AUTOMATED) 2022 02:29:00 [facility]

 

                POCT GLUCOSE (AUTOMATED) 2022 02:29:00 Shelton Cunningham [facility]

 

                HB ECG ROUTINE & RHYTHM 2022 00:33:38 Octavio [facility]

 

                HB ECG ROUTINE & RHYTHM 2022 00:33:38 Octavio [facility]

 

                URINALYSIS      2022 00:28:00 [facility]

 

                URINE CULTURE   2022 00:28:00 Octavio [facility]

 

                URINALYSIS      2022 00:28:00 [facility]

 

                URINE CULTURE   2022 00:28:00 Octavio [facility]

 

                CBC WITH DIFF   2022 00:25:00 Octavio [facility]

 

                COMP. METABOLIC PANEL 2022 00:25:00 Octavio [facility]



                ([id number])                                         Medical Branch

 

                LIPASE          2022 00:25:00 Octavio [facility]

 

                LACTIC ACID WHOLE BLOOD 2022 00:25:00 Octavio [facility]

 

                ACUTE CARE VENOUS BLOOD 2022 00:25:00 Octavio [facility]

 

                BLOOD CULTURE SCREEN 2022 00:25:00 [facility]

 

                BLOOD CULTURE SCREEN 2022 00:25:00 [facility]

 

                LIPASE          2022 00:25:00 Octavio [facility]

 

                COMP. METABOLIC PANEL 2022 00:25:00 [facility]



                ([id number])                                         [facility]

 

                ACUTE CARE VENOUS BLOOD 2022 00:25:00 Octavio [facility]

 

                CBC WITH DIFF   2022 00:25:00 [facility]

 

                LACTIC ACID WHOLE BLOOD 2022 00:25:00 Octavio [facility]

 

                EMERGENCY DEPARTMENT 2022 05:01:00 Doctor Unassigned, No U

niversity of 

[facility]

 

                HOSPITAL ADMISSION 2022 05:01:00 Doctor Unassigned, No Uni

versity of [facility]

 

                CBC WITH DIFF   2022 17:57:00 Simran Phillips    [facility]

 

                BASIC METABOLIC PANEL 2022 17:57:00 Alan [facility]



                (NA, K, CL, CO2, GLUCOSE,                                 Medica

l Branch



                BUN, CREATININE, CA)                                 

 

                MAGNESIUM       2022 17:57:00 Alan [facility]

 

                MAGNESIUM       2022 17:57:00 Alan [facility]

 

                BASIC METABOLIC PANEL 2022 17:57:00 [facility]



                (NA, K, CL, CO2, GLUCOSE,                                 Medica

l Branch



                BUN, CREATININE, CA)                                 

 

                CBC WITH DIFF   2022 17:57:00 [facility]

 

                POCT GLUCOSE (AUTOMATED) 2022 16:49:00 Terminella, Efrain U

niversity of 

[facility]

 

                POCT GLUCOSE (AUTOMATED) 2022 16:49:00 Terminella, Efrain U

niversity of 

[facility]

 

                POCT GLUCOSE (AUTOMATED) 2022 12:56:00 Terminella, Efrain U

niversity of 

[facility]

 

                POCT GLUCOSE (AUTOMATED) 2022 12:56:00 Terminella, Efrain U

niversity of 

[facility]

 

                POCT GLUCOSE (AUTOMATED) 2022 09:41:00 Terminella, Efrain U

niversity of 

[facility]

 

                POCT GLUCOSE (AUTOMATED) 2022 09:41:00 Terminella, Efrain U

niversity of 

[facility]

 

                POCT GLUCOSE (AUTOMATED) 2022 05:08:00 Terminella, Efrain U

niversity of 

[facility]

 

                POCT GLUCOSE (AUTOMATED) 2022 05:08:00 Terminella, Efrain U

niversity of 

[facility]

 

                POCT GLUCOSE (AUTOMATED) 2022 01:26:00 Terminella, Efrain U

niversity of 

[facility]

 

                POCT GLUCOSE (AUTOMATED) 2022 01:26:00 Terminella, Efrain U

niversity of 

[facility]

 

                POCT GLUCOSE (AUTOMATED) 2022 21:41:00 Terminella, Efrain U

niversity of 

[facility]

 

                POCT GLUCOSE (AUTOMATED) 2022 21:41:00 Terminella, Efrain U

niversity of 

[facility]

 

                POCT GLUCOSE (AUTOMATED) 2022 17:07:00 Terminella, Efrain U

niversity of 

[facility]

 

                POCT GLUCOSE (AUTOMATED) 2022 17:07:00 Terminella, Efrain U

niversity of 

[facility]

 

                POCT GLUCOSE (AUTOMATED) 2022 14:01:00 Terminella, Efrain U

niversity of 

[facility]

 

                POCT GLUCOSE (AUTOMATED) 2022 14:01:00 Terminella, Efrain U

niversity of 

[facility]

 

                POCT GLUCOSE (AUTOMATED) 2022 09:18:00 Terminella, Efrain U

niversity of 

[facility]

 

                POCT GLUCOSE (AUTOMATED) 2022 09:18:00 Terminella, Efrain U

niversity of 

[facility]

 

                POCT GLUCOSE (AUTOMATED) 2022 01:43:00 Terminella, Efrain U

niversity of 

[facility]

 

                POCT GLUCOSE (AUTOMATED) 2022 01:43:00 Terminella, Efrain U

niversity of 

[facility]

 

                POCT GLUCOSE (AUTOMATED) 2022 21:24:00 Albustami Tobias Uni

versity of [facility]

 

                POCT GLUCOSE (AUTOMATED) 2022 21:24:00 Albustami Tobias Uni

versity of [facility]

 

                POCT GLUCOSE (AUTOMATED) 2022 16:25:00 AlbustamiTobias Uni

versity of [facility]

 

                POCT GLUCOSE (AUTOMATED) 2022 16:25:00 AlbustamiTobias Uni

versity of [facility]

 

                URINALYSIS      2022 15:46:00 Simran Phillips [facility]

 

                URINE CULTURE   2022 15:46:00 [facility]

 

                URINALYSIS      2022 15:46:00 [facility]

 

                URINE CULTURE   2022 15:46:00 [facility]

 

                POCT GLUCOSE (AUTOMATED) 2022 15:36:00 Tobias Hernandez Uni

versity of [facility]

 

                POCT GLUCOSE (AUTOMATED) 2022 15:36:00 AlbTobias gupta Uni

versity of [facility]

 

                POCT GLUCOSE (AUTOMATED) 2022 14:25:00 AlbTobias gupta Uni

versity of [facility]

 

                POCT GLUCOSE (AUTOMATED) 2022 14:25:00 Tobias Hernandez Uni

versity of [facility]

 

                BASIC METABOLIC PANEL 2022 13:45:00 Tobias Hernandez Univer

[facility] [place]



                (NA, K, CL, CO2, GLUCOSE,                                 Medica

l Branch



                BUN, CREATININE, CA)                                 

 

                BASIC METABOLIC PANEL 2022 13:45:00 Albcandy Tobias Univer

[facility] [place]



                (NA, K, CL, CO2, GLUCOSE,                                 Medica

l Branch



                BUN, CREATININE, CA)                                 

 

                POCT GLUCOSE (AUTOMATED) 2022 13:34:00 Tobias Hernandez Uni

versity of [facility]

 

                POCT GLUCOSE (AUTOMATED) 2022 13:34:00 AlbTobias gupta Uni

versity of [facility]

 

                CRITICAL CARE   2022 13:05:05 Brigido [facility]

 

                CRITICAL CARE   2022 13:05:05 [facility]

 

                POCT GLUCOSE (AUTOMATED) 2022 12:23:00 Tobias Hernandez Uni

versity of [facility]

 

                POCT GLUCOSE (AUTOMATED) 2022 12:23:00 AlbdamianamiTobias Uni

versity of [facility]

 

                POCT GLUCOSE (AUTOMATED) 2022 11:24:00 AlbdamianamiTobias Uni

versity of [facility]

 

                POCT GLUCOSE (AUTOMATED) 2022 11:24:00 AlbTobias gupta Uni

versity of [facility]

 

                POCT GLUCOSE (AUTOMATED) 2022 10:33:00 AlbdamianamiTobias Uni

versity of [facility]

 

                POCT GLUCOSE (AUTOMATED) 2022 10:33:00 AlbTobias gupta Uni

versity of [facility]

 

                BASIC METABOLIC PANEL 2022 09:32:00 YongCarniar Univer

[facility] [place]



                (NA, K, CL, CO2, GLUCOSE,                                 Medica

l Branch



                BUN, CREATININE, CA)                                 

 

                BASIC METABOLIC PANEL 2022 09:32:00 Yong Tobias Univer

[facility] [place]



                (NA, K, CL, CO2, GLUCOSE,                                 Medica

l Branch



                BUN, CREATININE, CA)                                 

 

                POCT GLUCOSE (AUTOMATED) 2022 09:30:00 Tobias Hernandez Uni

versity [facility]

 

                POCT GLUCOSE (AUTOMATED) 2022 09:30:00 AlbTobias gupta [facility]

 

                POCT GLUCOSE (AUTOMATED) 2022 08:27:00 AlbTobias gupta Uni

versity [facility]

 

                POCT GLUCOSE (AUTOMATED) 2022 08:27:00 Tobias Hernandez Uni

versity [facility]

 

                POCT GLUCOSE (AUTOMATED) 2022 07:23:00 AlbTobias gupta [facility]

 

                POCT GLUCOSE (AUTOMATED) 2022 07:23:00 AlbTobias gupta [facility]

 

                POCT GLUCOSE (AUTOMATED) 2022 06:22:00 AlbTobias gupta [facility]

 

                POCT GLUCOSE (AUTOMATED) 2022 06:22:00 AlbTobias gupta [facility]

 

                BASIC METABOLIC PANEL 2022 05:30:00 Tobias Hernandez Univer

[facility] [place]



                (NA, K, CL, CO2, GLUCOSE,                                 Medica

l Branch



                BUN, CREATININE, CA)                                 

 

                BASIC METABOLIC PANEL 2022 05:30:00 Yong Tobias Univer

[facility] [place]



                (NA, K, CL, CO2, GLUCOSE,                                 Medica

l Branch



                BUN, CREATININE, CA)                                 

 

                POCT GLUCOSE (AUTOMATED) 2022 05:27:00 Tobias Hernandez Uni

versity [facility]

 

                POCT GLUCOSE (AUTOMATED) 2022 05:27:00 AlbTobias gupta Uni

versity [facility]

 

                POCT GLUCOSE (AUTOMATED) 2022 04:23:00 AlbTobias gupta

[facility]

 

                POCT GLUCOSE (AUTOMATED) 2022 04:23:00 AlbTobias gupta [facility]

 

                POCT GLUCOSE (AUTOMATED) 2022 03:19:00 AlbTobias gupta [facility]

[facility]

 

                POCT GLUCOSE (AUTOMATED) 2022 03:19:00 AlbTobias gupta [facility]

 

                POCT GLUCOSE (AUTOMATED) 2022 02:24:00 AlbTobias gupta [facility]

 

                POCT GLUCOSE (AUTOMATED) 2022 02:24:00 Yong Tobias [facility]

 

                KETONES URINE   2022 01:49:00 [facility]

 

                KETONES URINE   2022 01:49:00 [facility]

 

                BETA HYDROXY-BUTYRATE 2022 01:44:00 Phillips [facility]

 

                BASIC METABOLIC PANEL 2022 01:44:00 [facility]



                (NA, K, CL, CO2, GLUCOSE,                                 Medica

l Branch



                BUN, CREATININE, CA)                                 

 

                BETA HYDROXY-BUTYRATE 2022 01:44:00 Phillips [facility]

 

                BASIC METABOLIC PANEL 2022 01:44:00 [facility]



                (NA, K, CL, CO2, GLUCOSE,                                 Medica

l Branch



                BUN, CREATININE, CA)                                 

 

                POCT GLUCOSE (AUTOMATED) 2022 01:24:00 Tobias Hernandez [facility]

 

                POCT GLUCOSE (AUTOMATED) 2022 01:24:00 AlbTobias gupta [facility]

 

                POCT GLUCOSE (AUTOMATED) 2022 00:18:00 AlbTobias gupta [facility]

 

                POCT GLUCOSE (AUTOMATED) 2022 00:18:00 Tobias Hernandez [facility]

 

                POCT GLUCOSE (AUTOMATED) 2022 22:56:00 Tobias Hernandez

[facility]

 

                POCT GLUCOSE (AUTOMATED) 2022 22:56:00 Tobias Hernandez Bella

[facility]

 

                BASIC METABOLIC PANEL 2022 22:03:00 Tobias Hernandez Univer

[facility] [place]



                (NA, K, CL, CO2, GLUCOSE,                                 Medica

l Branch



                BUN, CREATININE, CA)                                 

 

                BASIC METABOLIC PANEL 2022 22:03:00 Tobias Hernandez Univer

[facility] [place]



                (NA, K, CL, CO2, GLUCOSE,                                 Medica

l Branch



                BUN, CREATININE, CA)                                 

 

                POCT GLUCOSE (AUTOMATED) 2022 21:50:00 Tobias Hernandez Bella

[facility]

 

                POCT GLUCOSE (AUTOMATED) 2022 21:50:00 Tobias Hernandez [facility]

[facility]

 

                POCT GLUCOSE (AUTOMATED) 2022 20:39:00 Tobias Hernandez [facility]

[facility]

 

                POCT GLUCOSE (AUTOMATED) 2022 20:39:00 Tobias Hernandez Bella

[facility]

 

                POCT GLUCOSE (AUTOMATED) 2022 18:58:00 Tobias Hernandez [facility]

 

                POCT GLUCOSE (AUTOMATED) 2022 18:58:00 Tobias Hernandez [facility]

 

                BASIC METABOLIC PANEL 2022 17:46:00 Tobias Hernandez Univer

[facility] [place]



                (NA, K, CL, CO2, GLUCOSE,                                 Medica

l Branch



                BUN, CREATININE, CA)                                 

 

                BASIC METABOLIC PANEL 2022 17:46:00 Tobias Hernandez Univer

[facility] [place]



                (NA, K, CL, CO2, GLUCOSE,                                 Medica

l Branch



                BUN, CREATININE, CA)                                 

 

                POCT GLUCOSE (AUTOMATED) 2022 17:39:00 Tobias Hernandez [facility]

 

                POCT GLUCOSE (AUTOMATED) 2022 17:39:00 AlbTobias gupta Uni

[facility]

 

                POCT GLUCOSE (AUTOMATED) 2022 16:22:00 Tobias Hernandez [facility]

 

                POCT GLUCOSE (AUTOMATED) 2022 16:22:00 Albcandy Tobias [facility]

 

                POCT GLUCOSE (AUTOMATED) 2022 15:27:00 Albcandy Tboias [facility]

 

                POCT GLUCOSE (AUTOMATED) 2022 15:27:00 Albcandy Tobias [facility]

 

                POCT GLUCOSE (AUTOMATED) 2022 14:17:00 Albustami Tobias [facility]

 

                POCT GLUCOSE (AUTOMATED) 2022 14:17:00 Albustami [facility]

 

                CBC WITHOUT DIFF 2022 13:33:00 Albustami [facility]

 

                CBC WITHOUT DIFF 2022 13:33:00 Yong [facility]

 

                POCT GLUCOSE (AUTOMATED) 2022 13:20:00 Yong Tobias [facility]

 

                POCT GLUCOSE (AUTOMATED) 2022 13:20:00 Albustami [facility]

 

                POCT GLUCOSE (AUTOMATED) 2022 12:13:00 Albdamianami [facility]

 

                POCT GLUCOSE (AUTOMATED) 2022 12:13:00 Albcandy [facility]

 

                XR CHEST 1 VW   2022 11:46:06 Yong [facility]

 

                XR CHEST 1 VW   2022 11:46:06 Yong [facility]

 

                MAGNESIUM       2022 10:54:00 Yong [facility]

 

                MRSA / MSSA SCREEN BY 2022 10:54:00 Yong [facility]ias [facility]

[facility] [place]



                PCR, [facility] [place]

 

                BASIC METABOLIC PANEL 2022 10:54:00 Yong Tobias [facility]

[facility] [place]



                (NA, K, CL, CO2, GLUCOSE,                                 Medica

l Branch



                BUN, CREATININE, CA)                                 

 

                OSMOLALITY, SERUM OR 2022 10:54:00 Yong [facility]

 

                PHOSPHORUS      2022 10:54:00 [facility]

 

                BETA HYDROXY-BUTYRATE 2022 10:54:00 Yong [facility]

 

                PHOSPHORUS      2022 10:54:00 Yong [facility]

 

                MAGNESIUM       2022 10:54:00 Yong [facility]

 

                OSMOLALITY, SERUM OR 2022 10:54:00 Yong [facility]

 

                BETA HYDROXY-BUTYRATE 2022 10:54:00 [facility]

 

                BASIC METABOLIC PANEL 2022 10:54:00 [facility]



                (NA, K, CL, CO2, GLUCOSE,                                 Medica

l Branch



                BUN, CREATININE, CA)                                 

 

                MRSA / MSSA SCREEN BY 2022 10:54:00 [facility] Tobias [facility]

[facility] [place]



                PCR, [facility] [place]

 

                POCT GLUCOSE (AUTOMATED) 2022 10:53:00 Tobias Hernandez

[facility]

 

                POCT GLUCOSE (AUTOMATED) 2022 10:53:00 [facility]

 

                POCT GLUCOSE (AUTOMATED) 2022 08:46:00 Rad[facility] Fof[facility]

[facility]

 

                POCT GLUCOSE (AUTOMATED) 2022 08:46:00 Radha Fofana

versity of [facility]

 

                POCT GLUCOSE (AUTOMATED) 2022 07:39:00 Radha Fofana

versity of [facility]

 

                POCT GLUCOSE (AUTOMATED) 2022 07:39:00 Radha Fofana

versity [facility]

 

                POCT GLUCOSE (AUTOMATED) 2022 07:02:00 Radha Fofana

versity of [facility]

 

                POCT GLUCOSE (AUTOMATED) 2022 07:02:00 Radha Fofana

versity of [facility]

 

                POCT GLUCOSE (AUTOMATED) 2022 06:17:00 Fofana, Radha [facility]

 

                POCT GLUCOSE (AUTOMATED) 2022 06:17:00 Radha Fofana [facility]

 

                AC PANEL 21 + LACTIC ACID 2022 05:36:00 [facility] 

[facility]

 

                AC PANEL 21 + LACTIC ACID 2022 05:36:00 Radha Fofana 

[facility]

 

                POCT GLUCOSE (AUTOMATED) 2022 04:58:00 Radha Fofana [facility]

 

                POCT GLUCOSE (AUTOMATED) 2022 04:58:00 Radha Fofana [facility]

 

                CT ABDOMEN PELVIS W 2022 04:33:00 Radha Fofana [facility]



                CONTRAST                                        [facility]

 

                CT ABDOMEN PELVIS W 2022 04:33:00 Radha Fofana [facility]



                CONTRAST                                        [facility]

 

                COMP. METABOLIC PANEL 2022 04:20:00 Radha Fofana [facility]



                ([id number])                                         [facility]

 

                COMP. METABOLIC PANEL 2022 04:20:00 Radha Fofana [facility]



                ([id number])                                         [facility]

 

                CBC WITH DIFF   2022 03:14:00 Radha Fofana [facility]

 

                BLOOD CULTURE SCREEN 2022 03:14:00 Radha Fofana [facility]

 

                BLOOD CULTURE SCREEN 2022 03:14:00 Radha Fofana [facility]

 

                CBC WITH DIFF   2022 03:14:00 Radha Fofana [facility]

 

                ACTIVATED PARTIAL 2022 03:13:00 Radha Fofana [facility]



                [facility]

 

                PROTHROMBIN TIME / INR 2022 03:13:00 Radha Fofana [facility]

 

                LIPASE          2022 03:13:00 Radha Fofana [facility]

 

                TROPONIN I      2022 03:13:00 Radha Fofana [facility]

 

                N-TERMINAL PRO-BNP 2022 03:13:00 Radha Fofana [facility]

 

                LIPASE          2022 03:13:00 Radha Fofana [facility]

[facility]

 

                TROPONIN I      2022 03:13:00 Radha Fofana [place]

f [facility]

 

                PROTHROMBIN TIME / INR 2022 03:13:00 Radha Ffoana [facility]

 

                ACTIVATED PARTIAL 2022 03:13:00 [facility]



                THRMPLAS [facility]

 

                N-TERMINAL PRO-BNP 2022 03:13:00 Radha Fofana [facility]

 

                LACTIC ACID WHOLE BLOOD 2022 03:12:00 Radha Fofana [facility]

 

                LACTIC ACID WHOLE BLOOD 2022 03:12:00 Brigido [facility]

 

                EKG-12 LEAD     2022 01:55:16 Doctor Unassigned, No Univer

sity [facility]

 

                EKG-12 LEAD     2022 01:55:16 Doctor Unassigned, No Univer

sity of [facility]

 

                EMERGENCY DEPARTMENT 2022 05:01:00 Doctor Unassigned, No [facility]

 

                HOSPITAL ADMISSION 2022 05:01:00 Doctor Unassigned, No Uni

versity [facility]

 

                BASIC METABOLIC PANEL 2022 09:31:00 [facility]



                (NA, K, CL, CO2, GLUCOSE,                                 Medica

l Branch



                BUN, CREATININE, CA)                                 

 

                CBC WITH DIFF   2022 09:31:00 [facility]

 

                BASIC METABOLIC PANEL 2022 09:31:00 Eveline [facility]



                (NA, K, CL, CO2, GLUCOSE,                                 Medica

l Branch



                BUN, CREATININE, CA)                                 

 

                CBC WITH DIFF   2022 09:31:00 Eveline [facility]

 

                BASIC METABOLIC PANEL 2022 08:57:00 Eveline [facility]



                (NA, K, CL, CO2, GLUCOSE,                                 Medica

l Branch



                BUN, CREATININE, CA)                                 

 

                CBC WITH DIFF   2022 08:57:00 Eveline [facility]

 

                BASIC METABOLIC PANEL 2022 08:57:00 Moose Mosquera [facility]



                (NA, K, CL, CO2, GLUCOSE,                                 Medica

l Branch



                BUN, CREATININE, CA)                                 

 

                CBC WITH DIFF   2022 08:57:00 Eveline [facility]

 

                CBC WITH DIFF   2022 10:55:00 Letitia reymundo  [facility]

 

                COMP. METABOLIC PANEL 2022 10:55:00 Letitia reymundo  [facility]



                ([id number])                                         [facility]

 

                N-TERMINAL PRO-BNP 2022 10:55:00 Letitia [facility]

 

                COMP. METABOLIC PANEL 2022 10:55:00 Letitia reymundo  [facility]



                ([id number])                                         [facility]

 

                CBC WITH DIFF   2022 10:55:00 Letitia [facility]

 

                N-TERMINAL PRO-BNP 2022 10:55:00 Letitia [facility]

 

                CBC WITH DIFF   2022 11:33:00 Ashly Lees [facility]

 

                COMP. METABOLIC PANEL 2022 11:33:00 Letitia reymundo  [facility]



                ([id number])                                         [facility]

 

                N-TERMINAL PRO-BNP 2022 11:33:00 Letitia reymundo  [facility]

 

                MAGNESIUM       2022 11:33:00 Letitia reymundo  [facility]

 

                MAGNESIUM       2022 11:33:00 Letitia [facility]

 

                COMP. METABOLIC PANEL 2022 11:33:00 Letitia reymundo  [facility]



                ([id number])                                         [facility]

 

                CBC WITH DIFF   2022 11:33:00 Ashly Lees [facility]

 

                N-TERMINAL PRO-BNP 2022 11:33:00 Letitia reymundo  [facility]

 

                ASPIRATE OR ABSCESS 2022 21:31:00 Coleen Cerna   [facility] [place]



                CULTURE(AEROBIC/ANAEROBIC                                 Medica

l Branch



                )                                               

 

                ASPIRATE OR ABSCESS 2022 21:31:00 Coleen Cerna   [facility] [place]



                CULTURE(AEROBIC/ANAEROBIC                                 Medica

l Branch



                )                                               

 

                PROTEIN CREAT RATIO URINE 2022 11:11:00 Rand Dong  Un

[facility]

 

                SODIUM, URINE RANDOM 2022 11:11:00 Rand Dong  [facility]

 

                SODIUM, URINE RANDOM 2022 11:11:00 Rand Dong  [facility]

 

                PROTEIN CREAT RATIO URINE 2022 11:11:00 Rand Dong  [facility]

[facility]

 

                XR CHEST 1 VW   2022 11:07:43 Rand Dong  [facility]

 

                XR FOOT 3+ VW LEFT 2022 11:07:43 Rand Dong  [facility]

 

                XR CHEST 1 VW   2022 11:07:43 Rand Dong  [facility]

 

                XR FOOT 3+ VW LEFT 2022 11:07:43 Rand Dong  [facility]

 

                URINE DRUG (IMMUNOASSAY) 2022 11:07:00 Rand Dong

[facility]



                SCREEN                                          

 

                URINE DRUG (LCMSMS) - 2022 11:07:00 [facility]

[facility]



                OPIATES PANEL                                   Medical Branch

 

                URINE DRUG (IMMUNOASSAY) 2022 11:07:00 Rand Dong

[facility]



                SCREEN                                          

 

                URINE DRUG (LCMSMS) - 2022 11:07:00 Rand Dong  [facility]



                SYNTHETIC OPIATES PANEL                                 Medical 

Branch

 

                SEDIMENTATION RATE 2022 11:03:00 Rand Dong  [facility]

 

                PROCALCITONIN   2022 11:03:00 Rand Dong  [facility]

 

                CBC WITH DIFF   2022 11:03:00 Letitia, [facility]

 

                COMP. METABOLIC PANEL 2022 11:03:00 Letitia reymundo  [facility]



                ([id number])                                         [facility]

 

                N-TERMINAL PRO-BNP 2022 11:03:00 Letitia [facility]

 

                MAGNESIUM       2022 11:03:00 Letitai [facility]

 

                PHOSPHORUS      2022 11:03:00 Letitia [facility]

 

                CREATINE KINASE 2022 11:03:00 Letitia [facility]

 

                VITAMIN D, 25-OH 2022 11:03:00 Letitia [facility]

 

                VITAMIN B12, LEVEL 2022 11:03:00 Letitia [facility]

 

                GLYCOSYLATED HEMOGLOBIN 2022 11:03:00 Letitia Adnan  [facility]

[facility] [place]



                ([address]

 

                PHOSPHORUS      2022 11:03:00 Letitia [facility]

 

                CREATINE KINASE 2022 11:03:00 Letitia [facility]

 

                MAGNESIUM       2022 11:03:00 Letitia [facility]

 

                VITAMIN B12, LEVEL 2022 11:03:00 Letitia [facility]

 

                COMP. METABOLIC PANEL 2022 11:03:00 Letitia reymundo  [facility]



                ([id number])                                         [facility]

 

                SEDIMENTATION RATE 2022 11:03:00 Letitia [facility]

 

                CBC WITH DIFF   2022 11:03:00 Letitia [facility]

 

                GLYCOSYLATED HEMOGLOBIN 2022 11:03:00 Letitia Adnan  [facility]iv

[facility] [place]



                ([address]

 

                N-TERMINAL PRO-BNP 2022 11:03:00 Letitia [facility]

 

                VITAMIN D, 25-OH 2022 11:03:00 Letitia [facility]

 

                PROCALCITONIN   2022 11:03:00 Letitia reymundo  [facility]

 

                CT ANGIOGRAM LOWER 2022 03:39:00 Radha Fofana [facility]



                EXTREMITY LEFT W CONTRAST                                 Medica

l Branch

 

                CT ANGIOGRAM LOWER 2022 03:39:00 Radha Fofana [facility]



                EXTREMITY LEFT W CONTRAST                                 Medica

l [place]

 

                CT HEAD WO CONTRAST 2022 03:25:00 Radha Fofana [facility]

 

                CT HEAD WO CONTRAST 2022 03:25:00 Radha Fofana [facility]

 

                URINALYSIS      2022 01:22:00 Radha Fofana [facility]

 

                URINE CULTURE   2022 01:22:00 Radha Fofana [facility]

 

                URINALYSIS      2022 01:22:00 Radha Fofana [facility]

 

                URINE CULTURE   2022 01:22:00 Radha Fofana [facility]

 

                CBC WITH DIFF   2022 00:58:00 Radha Fofana [facility]

 

                ACTIVATED PARTIAL 2022 00:58:00 Radha Fofana [facility]

 

                PROTHROMBIN TIME / INR 2022 00:58:00 Radha Fofana [facility]

 

                COMP. METABOLIC PANEL 2022 00:58:00 Radha Fofana [facility]



                ([id number])                                         [facility]

 

                BLOOD CULTURE SCREEN 2022 00:58:00 Radha Fofana [facility]

 

                LACTIC ACID WHOLE BLOOD 2022 00:58:00 Radha Fofana [facility]

 

                N-TERMINAL PRO-BNP 2022 00:58:00 Rand Dong  [facility]

 

                MAGNESIUM       2022 00:58:00 Letitia [facility]

 

                PHOSPHORUS      2022 00:58:00 Letitia [facility]

 

                FERRITIN SERUM  2022 00:58:00 Letitia [facility]

 

                IRON PANEL      2022 00:58:00 Letitia [facility]

 

                THYROID STIMULATING 2022 00:58:00 Letitia [facility]

 

                LIPID PANEL ([id number])(TOTAL 2022 00:58:00 Rand Dong  [facility]

[facility]



                CHOLESTEROL,                                    [facility]



                TRIGLYCERIDES, HDL)                                 

 

                BLOOD CULTURE SCREEN 2022 00:58:00 Radha Fofana [facility]

 

                PHOSPHORUS      2022 00:58:00 Letitia reymundo  [facility]

 

                MAGNESIUM       2022 00:58:00 Letitia [facility]

 

                FERRITIN SERUM  2022 00:58:00 Letitia [facility]

 

                THYROID STIMULATING 2022 00:58:00 Letitia [facility]

 

                COMP. METABOLIC PANEL 2022 00:58:00 Radha Fofana [facility]



                ([id number])                                         Medical Branch

 

                LIPID PANEL ([id number])(TOTAL 2022 00:58:00 Rand Dong

[facility]



                CHOLESTEROL,                                    [facility]



                TRIGLYCERIDES, HDL)                                 

 

                IRON PANEL      2022 00:58:00 Letitia [facility]

 

                CBC WITH DIFF   2022 00:58:00 Radha Fofana [facility]

 

                PROTHROMBIN TIME / INR 2022 00:58:00 Radha Fofana [facility]

 

                ACTIVATED PARTIAL 2022 00:58:00 Radha Fofana [facility]



                THRMPLAS [facility]

 

                N-TERMINAL PRO-BNP 2022 00:58:00 Rand Dong  [facility]

 

                LACTIC ACID WHOLE BLOOD 2022 00:58:00 Radha Fofana [facility]

 

                EMERGENCY DEPARTMENT 2022-05-10 05:01:00 Doctor Unassigned, No [facility]

[facility]

 

                HOSPITAL ADM - MISC 2022-05-10 05:01:00 Doctor Unassigned, No [facility]

 

                HOSPITAL ADMISSION 2022-05-10 05:01:00 Doctor Unassigned, No Uni

versity of [facility]

 

                EMERGENCY DEPARTMENT 2022-05-10 05:01:00 Doctor Unassigned, No U

niversity of 

[facility]

 

                HOSPITAL ADM - MISC 2022-05-10 05:01:00 Doctor Unassigned, No Un

iversity of 

[facility]

 

                Spinal puncture, lumbar, 2022 20:30:00                 Patience Garcia



                diagnostic; with                                 



                fluoroscopic or CT                                 



                guidance                                        

 

                MAGNESIUM       2022 04:12:00 [facility]

 

                COMP. METABOLIC PANEL 2022 04:12:00 [facility]



                ([id number])                                         [facility]

 

                CBC WITH DIFF   2022 04:12:00 [facility]

 

                CT HEAD WO CONTRAST 2022 00:15:29 [address] [facility]

 

                URINALYSIS      2022 23:53:00 ALLISON Romeo Elena [facility]

 

                COMP. METABOLIC PANEL 2022 23:52:00 ALLISON Romeo Elena [facility]

[facility] [place]



                ([id number])                                         [facility]

 

                CBC WITH DIFF   2022 23:52:00 ALLISON Romeo [facility]

 

                XR CHEST 1 VW   2022 03:03:32 Rand Dong  [facility]

 

                COMP. METABOLIC PANEL 2022 11:57:00 [facility]



                ([id number])                                         [facility]

 

                CBC WITH DIFF   2022 11:57:00 [facility]

 

                BASIC METABOLIC PANEL 2022 12:10:00 [facility]



                (NA, K, CL, CO2, GLUCOSE,                                 Medica

l Branch



                BUN, CREATININE, CA)                                 

 

                CBC WITH DIFF   2022 12:09:00 Leo [facility]

 

                URINALYSIS      2022 00:40:00 Suly Luna [facility]

 

                URINE CULTURE   2022 00:40:00 Suly Luna [facility]

 

                FECES CULTURE   2022 14:58:00 Leo [facility]

 

                OCCULT (GUAIAC) BLOOD 2022 14:58:00 Leo [facility]

 

                CLOSTRIDIUM DIFFICILE 2022 14:58:00 Leo [facility]

 

                FECAL PATHOGENS BY PCR 2022 14:58:00 [facility]

 

                URINE DRUG (IMMUNOASSAY) 2022 10:11:00 [facility]



                SCREEN                                          

 

                COVID-19 (ID NOW RAPID 2022 07:33:00 Ashley Garay [facility]



                TESTING)                                        Medical Branch

 

                LAB ONLY COVID  2022 07:33:00 Ashley Garay [facility]



                INTERPRETATION                                  [facility]

 

                COMP. METABOLIC PANEL 2022 06:18:00 Ashley Garay [facility]



                ([id number])                                         [facility]

 

                CBC WITH DIFF   2022 05:40:00 Ashley Garay [facility]

 

                URINALYSIS      2022 01:43:00 Alma Villaseñor [facility]

 

                LIPASE          2022 01:40:00 Alma Villaseñor [facility]

 

                COMP. METABOLIC PANEL 2022 01:40:00 Alma Villaseñor [facility]



                ([id number])                                         [facility]

 

                CBC WITH DIFF   2022 01:38:00 Alma Villaseñor [facility]

 

                XR CHEST 1 VW   2022 23:40:19 Alma Villaseñor [facility]

 

                ASSIGNMENT OF BENEFITS 2022 22:05:40 Doctor Unassigned, No

 [facility]

 

                NOTICE OF PRIVACY 2022 22:04:58 Doctor Unassigned, No [facility]

 

                CONSENT/REFUSAL FOR 2022 22:04:33 Doctor Unassigned, No Un

[facility]



                DIAGNOSIS AND TREATMENT                 Name            Medical 

Branch

 

                URINALYSIS      2022-01-15 23:09:00 Neeta Maria [facility]

 

                BLOOD CULTURE SCREEN 2022-01-15 23:04:00 Neeta Maria [facility]

 

                D-DIMER         2022-01-15 22:49:00 Neeta Maria [facility]

 

                COVID-19 (ID NOW RAPID 2022-01-15 22:48:00 Neeta Maria [facility]

[facility] [place]



                TESTING)                                        [facility]

 

                COMP. METABOLIC PANEL 2022-01-15 22:17:00 Neeta Maria [facility]ive

[facility] [place]



                ([id number])                                         [facility]

 

                CBC WITH DIFF   2022-01-15 22:17:00 Neeta Maria [facility]

 

                XR CHEST 1 VW   2022-01-15 21:47:19 [facility] [place]

 

                COMP. METABOLIC PANEL 2021 22:20:00 Chris Sol [facility]

[facility] [place]



                ([id number])                                         [facility]

 

                CBC WITH DIFF   2021 22:20:00 Singer, [facility] o

f [facility]

 

                CT ABDOMEN PELVIS WO 2021-05-10 00:39:40 Acacia Villa Uni

[facility] [place]



                CONTRAST                                        [facility]

 

                LIPASE          2021-05-10 00:06:00 Soni Villao F [facility]

 

                COMP. METABOLIC PANEL 2021-05-10 00:06:00 Acacia Villa F Un

[facility]



                ([id number])                                         [facility]

 

                CBC WITH DIFF   2021-05-10 00:06:00 IbKodi cantuusho F [facility]

 

                URINALYSIS      2021-05-10 00:00:00 Soni Villao F [facility]

 

                COVID-19 (ID NOW RAPID 2021-05-10 00:00:00 IbsalvadorunKodi oseguerausho F [facility]

[facility]



                TESTING)                                        [facility]

 

                Cholecystectomy                                 [facility]

 

                Shunt of cerebral                                 [facility]

nn



                ventricle to extracranial                                 



                site                                            







Plan of Care







             Planned Activity Planned Date Details      Comments     Source

 

             Future Scheduled 2023-01-10   Lipid panel               [facility]

s



             Test         00:00:00     (procedure) [code =              [facility] 

Center



                                       87950883]                 

 

             Future Scheduled 2023-01-10   Lipid panel               [facility]

s



             Test         00:00:00     (procedure) [code =              [facility] 

Center



                                       [id number]]                 

 

             Future Scheduled 2023-01-10   Lipid panel               [facility]

s



             Test         00:00:00     (procedure) [code =              [facility] 

Center



                                       29466277]                 

 

             Future Scheduled 2022   INFLUENZA VACCINE (#1)              C

[facility]



             Test         00:00:00     [code = INFLUENZA              Medical Ce

nter



                                       VACCINE (#1)]              

 

             Future Scheduled 2022   INFLUENZA VACCINE (#1)              C

[facility]



             Test         00:00:00     [code = INFLUENZA              Medical Ce

nter



                                       VACCINE (#1)]              

 

             Future Scheduled 2022   DEPRESSION SCREENING              [facility]



             Test         00:00:00     (12+) [code =              Medical Center



                                       DEPRESSION SCREENING              



                                       (12+)]                    

 

             Future Scheduled 2022   DEPRESSION SCREENING              [facility]



             Test         00:00:00     (12+) [code =              Medical Center



                                       DEPRESSION SCREENING              



                                       (12+)]                    

 

             Future Scheduled 2021   INFLUENZA VACCINE              [facility]



             Test         00:00:00     (Season Ended) [code =              Medic

al Center



                                       INFLUENZA VACCINE              



                                       (Season Ended)]              

 

             Future Scheduled 2021   DEPRESSION SCREENING              [facility]



             Test         00:00:00     (12+) [code =              Medical Center



                                       DEPRESSION SCREENING              



                                       (12+)]                    

 

             Future Scheduled 2020-04-10   Hemoglobin A1c              [facility]

kes



             Test         00:00:00     measurement               Medical Center



                                       (procedure) [code =              



                                       12234659]                 

 

             Future Scheduled 2020-04-10   Hemoglobin A1c              [facility]

kes



             Test         00:00:00     measurement               Medical Center



                                       (procedure) [code =              



                                       88899067]                 

 

             Future Scheduled 2020-04-10   Hemoglobin A1c              [facility]

kes



             Test         00:00:00     measurement               Medical Center



                                       (procedure) [code =              



                                       42342398]                 

 

             Future Scheduled 2004   DTAP/TDAP/TD VACCINES              [facility]

[facility]



             Test         00:00:00     (1 - Tdap) [code =              Medical C

enter



                                       DTAP/TDAP/TD VACCINES              



                                       (1 - Tdap)]               

 

             Future Scheduled 2004   DTAP/TDAP/TD VACCINES              [facility]

[facility]



             Test         00:00:00     (1 - Tdap) [code =              Medical C

enter



                                       DTAP/TDAP/TD VACCINES              



                                       (1 - Tdap)]               

 

             Future Scheduled 2004   DTAP/TDAP/TD VACCINES              [facility]



             Test         00:00:00     (1 - Tdap) [code =              Medical C

enter



                                       DTAP/TDAP/TD VACCINES              



                                       (1 - Tdap)]               

 

             Future Scheduled 2003   HEPATITIS C SCREENING              [facility]



             Test         00[facility]00:00     [code = HEPATITIS C              Medical 

Center



                                       SCREENING]                

 

             Future Scheduled 2003   HEPATITIS C SCREENING              [facility]



             Test         00:00:00     [code = HEPATITIS C              Medical 

Center



                                       SCREENING]                

 

             Future Scheduled 2003   HEPATITIS C SCREENING              [facility]



             Test         00:00:00     [code = HEPATITIS C              Medical 

Center



                                       SCREENING]                

 

             Future Scheduled 1997   Tobacco Cessation              [facility]



             Test         00:00:00     Counseling and              Medical Cente

r



                                       Screening (12+) [code              



                                       = Tobacco Cessation              



                                       Counseling and              



                                       Screening (12+)]              

 

             Future Scheduled 1997   COVID-19 VACCINE (1)              [facility]



             Test         00:00:00     [code = COVID-19              Medical Abilio

ter



                                       VACCINE (1)]              

 

             Future Scheduled 1995   DIABETIC EYE EXAM              [facility]



             Test         00:00:00     [code = DIABETIC EYE              Medical

 Center



                                       EXAM]                     

 

             Future Scheduled 1995   Urine screening for              [facility]



             Test         00:00:00     protein (procedure)              Medical 

Center



                                       [code = 380278281]              

 

             Future Scheduled 1995   DIABETIC EYE EXAM              [facility]



             Test         00:00:00     [code = DIABETIC EYE              Medical

 Center



                                       EXAM]                     

 

             Future Scheduled 1995   Urine screening for              [facility]



             Test         00:00:00     protein (procedure)              Medical 

Center



                                       [code = 604650052]              

 

             Future Scheduled 1995   DIABETIC EYE EXAM              [facility]



             Test         00:00:00     [code = DIABETIC EYE              Medical

 Center



                                       EXAM]                     

 

             Future Scheduled 1995   Urine screening for              [facility]



             Test         00:00:00     protein (procedure)              Medical 

Center



                                       [code = 255448659]              

 

             Future Scheduled 1991   PNEUMOCOCCAL VACCINE              [facility]



             Test         00:00:00     0-64 YRS (1 - PCV)              Medical C

enter



                                       [code = PNEUMOCOCCAL              



                                       VACCINE 0-64 YRS (1 -              



                                       PCV)]                     

 

             Future Scheduled 1991   PNEUMOCOCCAL VACCINE              [facility]



             Test         00:00:00     0-64 YRS (1 of 1 -              Medical C

enter



                                       PPSV23) [code =              



                                       PNEUMOCOCCAL VACCINE              



                                       0-64 YRS (1 of 1 -              



                                       PPSV23)]                  

 

             Future Scheduled 1991   PNEUMOCOCCAL VACCINE              [facility]



             Test         00:00:00     0-64 YRS (1 - PCV)              Medical C

enter



                                       [code = PNEUMOCOCCAL              



                                       VACCINE 0-64 YRS (1 -              



                                       PCV)]                     

 

             Future Scheduled 1986   COVID-19 VACCINE (#1)              [facility]



             Test         00:00:00     [code = COVID-19              Medical Abilio

ter



                                       VACCINE (#1)]              

 

             Future Scheduled 1986   COVID-19 VACCINE (#1)              [facility]



             Test         00:00:00     [code = COVID-19              Medical Abilio

ter



                                       VACCINE (#1)]              







Encounters







        Start   End     Encounter Admission Attending Care    Care    Encounter 

Source



        Date/Time Date/Time Type    Type    Clinicians Facility Department ID   

   

 

        2022         Outpatient                 [facility]     -[id number]

 [place]



        08:22:27                                                 0411    [facility]

 

        2022         Outpatient                 [facility]     -[id number]

 [place]



        11:10:01                                                 [id number]    [facility]

 

        2022         Outpatient         Jesusita,  STLMLC  STLC  [id number]

 Common



        13:45:16                         Domingo                  [id number]   [facility]

 

        2022         Outpatient         Jesusita,  STLMLC  STLMLC  [id number]

 Common



        13:17:39                         Domingo                  [address]192   [facility]

 

        2022         Outpatient         Jesusita,  STLMLC  STLMLC  [id number]

 Common



        13:16:34                         Domingo                  [address]236   [facility]

 

        2022 Outpatient BERNARD       JOHN [facility]     [id number]

[id number] Univers



        16:26:00 17:36:00                 EDWARDO melton [facility]

 

        2022 Emergency         Eugenekrista Neeta VIV [facility]    1.2.840

.114 [id number] 

[facility]



        16:26:00 17:36:00                 Edwardo Lopez 350.1.13.10    

     [facility] 4.2.7.2.686         [facility]  [phone]         Medi

sarah



                                                        081             Branch

 

        2022-10-31 2022-10-31 Emergency X       KASI, [facility]    ERT     [id number]

[id number] Univers



        20:12:00 22:15:00                 ALMA melton [facility]

 

        2022-10-31 2022-10-31 Emergency         Kasi, [facility]    1.2.918.915 [id number]

[id number] Univers



        20:12:00 22:15:00                 Alma PAN SHARI 350.1.13.10         

ity of



                                                [place] 4.2.7.2.686         [place]

[facility]  [phone]         Medi

sarah



                                                        084             Branch

 

        2022-10-29 2022-10-29 Emergency X       JARROD, [facility]    ERT     [id number]

67 Univers



        19:14:00 22:40:00                 ASHLEY                          ity [facility]

 

        2022-10-29 2022-10-29 Emergency         JarrodRUST    1.2.641.970 [id number]

9139 Univers



        19:14:00 22:40:00                 Ashley MCCARTHY 350.1.13.10         

ity of



                                                [facility] 4.2.7.2.686         [facility]  [phone]         Medi

sarah



                                                        084             [place]

 

        2022-10-25 2022-10-25 Transition         IMTIAZ Guzman  1.2.840.114 [id number]

[id number] Univers



        00:00:00 00:00:00 of Milka DUNN   350.1.13.10        

 ity of



                                                [place]   4.2.7.2.686         [place]

[place]



                                                        [phone]         Medi

sarah



                                                        403             Branch

 

        2022-10-21 2022-10-23 Inpatient X       LEO [facility]     [id number]

387 Univers



        00:19:00 12:45:00                 MERCY                           ity [facility]

 

        2022-10-21 2022-10-23 [facility]         Davey Janitrevin [facility]    1.2.8

40.114 [id number] 

Univers



        00:19:00 12:45:00 Encounter         Rachel Bailey 350.1.13.10 

        ity of



                                                [place] 4.2.7.2.686         [facility]  [phone]         Medi

sarah



                                                        081             Branch

 

        2022-10-18 2022-10-18 Outpatient R       VAMSHI [facility]    [id number]

[id number] Univers



        08:00:00 08:00:00                 SAPPHIRE                         ity

 [facility]

 

        2022-10-10 2022-10-10 Outpatient R       VAMSHI [facility]    [id number]

[id number] Univers



        08:00:00 08:00:00                 SAPPHIRE                         ity

 of



                                                                        [facility]

 

        2022 Outpatient R       ASHLEY MARY ANNE [facility]    [id number]

271 Univers



        14:00:00 14:00:00                 MARY ANNE RAMIREZ                         ity [facility]

 

        2022 Outpatient R       VAMSHI [facility]    [id number]

[id number] Univers



        08:30:00 08:30:00                 SAPPHIRE                         ity

 [facility]

 

        2022 Outpatient R       VAMSHI [facility]    [id number]

[id number] Univers



        08:00:00 08:00:00                 SAPPHIRE                         ity

 [facility]

 

        2022 Outpatient R       VAMSHI [facility]    [id number]

[id number] Univers



        11:30:00 11:30:00                 SAPPHIRE                         ity

 [facility]

 

        2022 Telephone         EDEN Helms    1.2.840.114 [id number]

[id number] Univers



        00:00:00 00:00:00                 Yessica BROWN   350.1.13.10         i

ty of



                                                [facility] 4.2.7.2.686         Eric

as



                                                        [phone]         Medi

sarah



                                                        025             Branch

 

        2022 Telephone         Yoselyn   [facility]    1.2.883.312 [id number]

1965 Univers



        00:00:00 00:00:00                 Jessi MULTISPEC 350.1.13.10       

  ity of



                                                [place]   4.2.7.2.686         [place]

[place]



                                                [place]  [phone]         Medi

sarah



                                                AND [place] 389             Branch



                                                DIABETES                 



                                                CLINIC                  

 

        2022-08-15 2022-08-15 Transition         IMTIAZ Rodney  1.2.840.114 [id number]

[id number] Univers



        00:00:00 00:00:00 of Care         Stephany DUNN   350.1.13.10         i

ty of



                                                [place]   4.2.7.2.686         [place]

[place]



                                                        [phone]         Medi

sarah



                                                        403             Branch

 

        2022 Inpatient X       SCOOBY SHARPE [facility]    KRISH     [id number]

[id number] Univers



        22:57:00 15:56:00                                                 ity of



                                                                        [facility]

 

        2022 Hospital         Yo Law MITRA HERNANDEZ  1.2.840.

114 [id number] 

Univers



        22:57:00 15:56:00 Encounter         Keenan Uribe STEPHANIE   350.1

.13.10         ity of



                                        Trish An [facility] 4.2.7.2.686     

    [place]



                                        Phillips, [facility] G         [phone]     

    [facility]



                                        Scooby Sharpe         095           

  Branch

 

        2022 Anesthesia         Sheridan Delarosa  1.2.84

0.114 [id number] 

Univers



        11:25:00 12:55:00 Event           [place]   350.1.13.10    

     ity of



                                                HOSPITAL 4.2.7.2.686         Eric

as



                                                        [phone]         Medi

sarah



                                                        103             Branch

 

        2022 Surgery         DAVID Bonds  1.2.572.515 [id number]

3251 Univers



        10:12:00 11:55:00                 Sapphire SEALY   350.1.13.10        

 ity of



                                        [facility] 4.2.7.2.686         Eric

as



                                                        [phone]         Medi

sarah



                                                        103             Branch

 

        2022 Travel                  1.2.840.1 1.2.803.277 [id number]

[id number] Univers



        00:00:00 00:00:00                         12492.1.1 350.1.13.10         

ity of



                                                3.104.2.7 4.2.7.3.698         [place]

[place]



                                                .3.948101 084.8           Medica

l



                                                .8                      Branch

 

        2022 Transition         Thomas,  1.2.840.1 [id number]

[id number] Univers



        00:00:00 00:00:00 of Care         Dulce 01141.1.1                 i

ty of



                                                3.104.2.7                 [place]



                                                .3.196021                 Medica

l



                                                .8                      Branch

 

        2022 Inpatient X       TABITHA [facility]     [id number]

04 Univers



        18:31:00 18:27:00                 KAYLYNN                          ity of



                                                                        [facility]

 

        2022 Hospital         Ashly Ortega 1.2.840.1 [id number]

[id number] 

Univers



        18:31:00 18:27:00 Encounter         Rachel Bailey 62251.1.1            

     ity of



                                        Kaylynn Lu 3.104.2.7                

 [place]



                                                .3.287280                 Medica

l



                                                .8                      [place]

 

        2022 [facility]patient R       [facility]    1041

[id number] Univers



        13:00:00 13:00:00                 RADHA melton o

f



                                                                        [facility]

 

        2022 Travel                  1.2.840.1 1.2.192.985 [id number]

[id number] Univers



        00:00:00 00:00:00                         37836.1.1 350.1.13.10         

ity of



                                                3.104.2.7 4.2.7.3.698         [place]

[place]



                                                .3.953428 084.8           Medica

l



                                                .8                      [place]

 

        2022-07-15 2022-07-15 Emergency X       SINGER, [facility]    ERT     [id number]

93 Univers



        17:06:00 23:29:00                 CHRIS                         ity [facility]

 

        2022-07-15 2022-07-15 Emergency         Alondra Santos 1.2.840.1 [id number]

[id number]

                                        Univers



        17:06:00 23:29:00                 Chris Sol 62217.1.1             

    ity of



                                                3.104.2.7                 [place]



                                                .3.650735                 Medica

l



                                                .8                      [place]

 

        2022-07-15 2022-07-15 Travel                  1.2.840.1 1.2.207.202 [id number]

2220 Univers



        00:00:00 00:00:00                         44842.1.1 350.1.13.10         

ity of



                                                3.104.2.7 4.2.7.3.698         [place]

[place]



                                                .3.060255 084.8           Medica

l



                                                .8                      [place]

 

        20[facility]    [id number]

[id number] Univers



        14:00:00 14:00:00                 RADHA melton o

f



                                                                        [facility]

 

        2022 Emergency X       BRIGIDORUST    ERT     [id number]

[id number] Univers



        04:52:00 08:20:00                 RADHA melton of



                                                                        [facility]

 

        2022 Emergency         Brigido, 1.2.840.8 [id number]

[id number] Univers



        04:52:00 08:20:00                 Radha 84584.1.1                 ity 

of



                                                3.104.2.7                 [place]



                                                .3.343321                 Medica

l



                                                .8                      [place]

 

        2022 Travel                  1.2.840.1 1.2.420.289 [id number]

5013 Univers



        00:00:00 00:00:00                         73712.1.1 350.1.13.10         

ity of



                                                3.104.2.7 4.2.7.3.698         [place]

[place]



                                                .3.874053 084.8           Medica

l



                                                .8                      [place]

 

        2022 Transition         Guzman,  1.2.840.5 [id number]

[id number] Univers



        00:00:00 00:00:00 of Care         Dulce 48973.1.1                 i

ty of



                                                3.104.2.7                 [place]



                                                .3.781638                 Medica

l



                                                .8                      [place]

 

        2022 [facility] X       [facility]     [id number]

02419 Univers



        20:31:00 21:18:00                 TREY                           geronimo [facility]

 

        2022 Hospital         Radha Fofana 1.2.840.1 [id number]

[id number] 

Univers



        20:31:00 21:18:00 Encounter         Noe Phillips 57239.1.1              

   ity of



                                        Aidan Meadowsin 3.104.2.7              

   [place]



                                                .3.157062                 Medica

l



                                                .8                      [place]

 

        2022 Transition         Guzman,  1.2.840.5 [id number]

[id number] Univers



        00:00:00 00:00:00 of Care         Dulce 64068.1.1                 i

ty of



                                                3.104.2.7                 [place]



                                                .3.994901                 Medica

l



                                                .8                      [place]

 

        2022 Travel                  1.2.840.1 1.2.510.548 4972

6235 Univers



        00:00:00 00:00:00                         59195.1.1 350.1.13.10         

ity of



                                                3.104.2.7 4.2.7.3.698         [place]

[place]



                                                .3.647795 084.8           Medica

l



                                                .8                      [place]

 

        2022 Transition         Guzman,  1.2.840.1 [id number]

[id number] Univers



        00:00:00 00:00:00 of Care         Dulce 30844.1.1                 i

ty of



                                                3.104.2.7                 [place]



                                                .3.065335                 Medica

l



                                                .8                      [place]

 

        2022 [facility] X       [facility]     [id number]

48 Univers



        22:21:00 16:16:00                 EDWARDO                           [facility]

 

        2022 Hospital         Josse Giles LIAN 1.2.840.1 [id number]

[id number] 

Univers



        22:21:00 16:16:00 Encounter         Edwardo Lopez 99486.1.1             

    ity of



                                                3.104.2.7                 [place]



                                                .3.639280                 Medica

l



                                                .8                      [place]

 

        2022 Travel                  1.2.840.1 1.2.982.049 4292

3062 Univers



        00:00:00 00:00:00                         00782.1.1 350.1.13.10         

ity of



                                                3.104.2.7 4.2.7.3.698         [place]

[place]



                                                .3.675418 084.8           Medica

l



                                                .8                      [place]

 

        2022 2022-06-10 [facility]patient X       [facility]     [id number]

8802 Univers



        18:25:00 19:30:00                 MERCY                           [facility]

 

        2022 2022-06-10 Emergency         Shelton Cunningham 1.2.840.1 [id number]

[id number] 

Univers



        18:25:00 19:30:00                 Rachel Bailey 53828.1.1              

   ity of



                                                3.104.2.7                 [place]



                                                .3.200345                 Medica

l



                                                .8                      [place]

 

        2022 Travel                  1.2.840.1 1.2.209.741 5958

2645 Univers



        00:00:00 00:00:00                         43711.1.1 350.1.13.10         

ity of



                                                3.104.2.7 4.2.7.3.698         [place]

[place]



                                                .3.831826 084.8           Medica

l



                                                .8                      [place]

 

        2022 Travel                  1.2.840.1 1.2.791.361 5038

5045 Univers



        00:00:00 00:00:00                         63954.1.1 350.1.13.10         

ity of



                                                3.104.2.7 4.2.7.3.698         [place]

[place]



                                                .3.161082 084.8           Medica

l



                                                .8                      [place]

 

        2022 Transition         Rodney, 1.2.840.2 [id number]

[id number] Univers



        00:00:00 00:00:00 of Care         Stephany ARAUJO 33066.1.1                 ity

 of



                                                3.104.2.7                 [place]



                                                .3.372405                 Medica

l



                                                .8                      [place]

 

        2022 Inpatient X       SHAIKH [facility]     [id number]

83 Univers



        20:53:00 18:25:00                 DEAN spangler



                                                                        [facility]

 

        2022 [facility]         Radha Fofana 1.2.840.1 [id number]

[id number] 

Univers



        20:53:00 18:25:00 Encounter         Tobias Hernandez 65367.1.1           

      ity of



                                        Efrain Myles 3.104.2.7             

    [place]



                                        Martha Powell H .3.666322               

  Medical



                                        Dean Segovia .8                     

 [place]

 

        2022 Travel                  1.2.840.1 1.2.057.234 6036

3172 Univers



        00:00:00 00:00:00                         34693.1.1 350.1.13.10         

ity of



                                                3.104.2.7 4.2.7.3.698         [place]

[place]



                                                .3.460806 084.8           Medica

l



                                                .8                      [place]

 

        2022 Transition         Guzman,  1.2.840.8 [id number]

[id number] Univers



        00:00:00 00:00:00 of Care         Dulce 68332.1.1                 i

ty of



                                                3.104.2.7                 [place]



                                                .3.708182                 Medica

l



                                                .8                      [place]

 

        2022-05-10 2022 Inpatient X       LETITIA [facility]    KRISH     [id number]

201 Univers



        19:10:00 17:32:00                 ADNAN                           ity of



                                                                        [facility]

 

        2022-05-10 2022 Hospital         Radha Fofana 1.2.840.1 [id number]

[id number] 

Univers



        19:10:00 17:32:00 Encounter         Rand Dong 12822.1.1            

     ity of



                                                3.104.2.7                 [place]



                                                .3.364687                 Medica

l



                                                .8                      [place]

 

        2022-05-10 2022-05-10 Travel                  1.2.840.1 1.2.196.018 0958

6685 Univers



        00:00:00 00:00:00                         22663.1.1 350.1.13.10         

ity of



                                                3.104.2.7 4.2.7.3.698         [place]

[place]



                                                .3.448337 084.8           Medica

l



                                                .8                      [place]

 

        2022 Observatio                 nullFlavo [facility] 5510

[id number] Memoria



        12:00:00 22:50:00 mellissa Garcia [address] [place] [address]

 

        2022 Observatio                 nullFlavo [facility] 5510

[id number] Memoria



        12:00:00 22:50:00 mellissa Garcia [address] [place] [address]

 

        2022 Outpatient U       [place], [facility]    

    [facility]



        07:00:00 17:50:00                 TREY                          

 

        2022 Outpatient         Donny [facility]   5510

[id number] 



        07:00:00 17:50:00                 Trey Blas      

 

        2022 Outpatient         Donny, [facility]   5510

[id number] 



        18:14:00 18:14:00                 Trey Blas      

 

        2022 Emergency X       SINGER, [facility]    ERT     [id number]

29 Univers



        22:45:00 01:52:00                 CHRIS melton [facility]

 

        2022 Emergency         [facility]inger[facility]UST    1.2.363.085 3182

2685 Univers



        22:45:00 01:52:00                 Chris MCCARTHY 350.1.13.10         i

ty [facility] 4.2.7.2.686         [facility]  [phone]         [address]

 

        2022 Inpatient                 [facility]Flavo [facility] 48323

11267 Memoria



        05:12:00 15:30:00                         r       [address]

 

        2022 Inpatient                 nullFlavo [facility] 91055

14740 Memoria



        05:12:00 15:30:00                         r       [address]

 

        2022 Outpatient         Ap,  [facility]   [id number]

[id number] 



        00:12:00 10:30:00                 Bernardo Guajardo                         

 

        2022 Emergency                 [facility] 29126

40335 Memoria



        02:51:00 02:51:00                         r       [address]

 

        2022 Emergency                 [facility] 10205

68156 Memoria



        02:51:00 02:51:00                         r       [address]

 

        2022 Outpatient         Ap,  [facility]   [id number]

620 



        00:12:00 00:12:00                 Bernardo Chaparro      



                                        Roby-Hirsch                         

 

        2022 Outpatient         Ap,  [facility]   [id number]

620 



        21:51:00 21:51:00                 Bernardo                  Margarette      



                                        RobySydnieHirsch                         

 

        2022 Emergency X       ALLISON ROMEO [facility]    ERT     [id number]

5665 Univers



        18:08:00 22:51:00                                                 ity of



                                                                        [facility]

 

        2022 Emergency         ALLISON Romeo [facility]    1.2.840.114 [id number]

[id number] Univers



        18:08:00 22:51:00                 Elena MCCARTHY 350.1.13.10         i

ty of



                                                [place] 4.2.7.2.686         [facility]  [phone]         Medi

sarah



                                                        084             [place]

 

        2022 Transition         IMTIAZ Guzman  1.2.840.114 907

31968 Univers



        00:00:00 00:00:00 of Care         Dulce DUNN   350.1.13.10        

 ity of



                                                [place]   4.2.7.2.686         [place]

[place]



                                                        [phone]         Medi

sarah



                                                        403             [place]

 

        2022 Inpatient X       [facility]    KR[place]     [id number]

27 Univers



        19:07:00 19:39:00                 [facility]

 

        2022 [facility]    1.2.840.

114 [id number] 

Univers



        19:07:00 19:39:00 Encounter         Rachel Bailey 350.1.13.10 

        ity [facility] 4.2.7.2.[address] [place] [address]  [phone]         [address]

 

        2022 Emergency X       [facility]    ERT     [id number]

54 Univers



        14:41:00 22:07:00                 ALMA                          [facility]

 

        2022 Emergency         Cac[facility]UST    1.2.696.859 2555

7030 Univers



        14:41:00 22:07:00                 Alma MCCARTHY 350.1.13.10         

ity [facility] 4.2.7.2.686         [facility]  [phone]         [address]

 

        2022-01-15 2022-01-15 Emergency X       [facility]     [id number]

25 Univers



        14:49:00 21:33:00                 NEETA                          [facility]

 

        2022-01-15 2022-01-15 Emergency         [facility]    1.2.704.794 2175

0725 Univers



        14:49:00 21:33:00                 Neeta MCCARTHY 350.1.13.10         

ity of



                                                [place] 4.2.7.2.686         [facility]  [phone]         [address]

 

        2021 Laboratory         Only, Ang Db Test [facility]    1.2.8

40.114 [id number] 

Univers



        18:00:00 18:15:00 Only            Mario Alberto Nicole [facility]  350.1.13.10      

   ity of



                                                [place] 4.2.7.2.686         Eric

as



                                                HÉCTOR?BLEA [phone]         [address]



                                                MEDICAL                 



                                                OFFICE                  



                                                BUILDING                 

 

        2021 Outpatient R       MARIO ALBERTO   [facility]    [id number]

787 Univers



        18:00:00 18:00:00                 NICOLE                          [facility]

 

        2021 (TEL)                   STLMLC  STLMLC  [id number] Co

mmon



        00:00:00 00:00:00                                                 Spirit



                                                                        [facility]

 

        2021 (ESTPT)                 STLMLC  STLMLC  [id number] Co

mmon



        00:00:00 00:00:00 Abhi gaona Patient                                         - [facility]



                                                                        [facility]

 

        2021 Emergency X       [facility]UST    ERT     [id number]

74 Univers



        15:54:00 18:34:00                 CHRIS                         [facility]

 

        2021 Emergency         [facility]    1.2.467.915 2560

8236 Univers



        15:54:00 18:34:00                 Chris MCCARTHY 350.1.13.10         i

ty of



                                                [facility] 4.2.7.2.686         [facility]  [phone]         [address]

 

        2021-10-12 2021-10-12 (TEL)                   STLMLC  STLMLC  [id number] Co

mmon



        00:00:00 00:00:00                                                 Spirit



                                                                         [facility]



                                                                        [facility]

 

        2021 (ESTPT)                 STLMLC  STLMLC  [id number] Co

mmon



        00:00:00 00:00:00 Establishe                                         Spi

rit



                        d Patient                                         - [facility]



                                                                        [facility]

 

        2021-08-10 2021-08-10 (TEL)                   [facility]  [id number]

mmon



        00:00:00 00:00:00                                                 Spirit



                                                                        - [facility]



                                                                        [facility]

 

        2021 (ESTPT)                 STLMLC  STLC  [id number] Co

mmon



        00:00:00 00:00:00 Establishe                                         Spi

rit



                        d Patient                                         - [facility]



                                                                        [facility]

 

        2021 [facility]                  [facility]  [id number] Co

mmon



        00:00:00 00:00:00 VISIT NEW                                         Spir

it



                        PT LEVEL 3                                         - [facility]

 

        2021 Emergency         [facility]    1.2.840.114 [id number]

[id number] [facility]



        18:22:00 22:21:00                 Acacia Mccarthy 350.1.13.10        

 [facility] 4.2.7.2.686         [facility]  [phone]         [address]

 

        2021 Emergency         [facility]    1.2.840.114 [id number]

[id number] 



        18:22:00 22:21:00                 Acacia Mccarthy 350.1.13.10        

 



                                                [place] 4.2.7.2.686         



                                                [place]  [phone]         



                                                        [facility]             

 

        2021 Emergency X       [facility]    ERT     [id number]

4744 Univers



        18:22:00 18:22:00                 ACACIA                         ity [facility]

 

        2019 Phone                   nullFlavo MNA     [id number]

55 Memoria



        16:11:26 04:59:59 Message                 r       Neurosurger 08      l



                                                        y [facility]

 

        2019 Phone                   nullFlavo MNA     [id number]

55 Memoria



        16:11:26 04:59:59 Message                 r       Neurosurger 08      l



                                                        y [facility]

 

        2019 Outpatient                 [facility]

[id number] 



        11:11:26 23:59:59                                         2019 Phone                   nullFlavo MNA     [id number]

55 Memoria



        16:16:47 04:59:59 Message                 r       Neurosurger 07      l



                                                        y [facility]

 

        2019 Phone                   nullFlavo MNA     [id number]

55 Memoria



        16:16:47 04:59:59 Message                 r       Neurosurger 07      l



                                                        y [facility]

 

        2019 [facility]                 [facility]

[id number] 



        11:16:47 23:59:59                                         07      

 

        2019-07-15 2019 Phone                   nullFlavo MNA     [id number]

55 Memoria



        15:52:03 04:59:59 Message                 r       Neurosurger 06      l



                                                        y [facility]

 

        2019-07-15 2019 Phone                   nullFlavo MNA     [id number]

55 Memoria



        15:52:03 04:59:59 Message                 r       Neurosurger 06      l



                                                        y [facility]

 

        2019-07-15 2019 Outpatient                 [facility]

[id number] 



        10:52:03 23:59:59                                         2019 Phone                   nullFlavo MNA     [id number]

55 Memoria



        18:55:03 04:59:59 Message                 r       Neurosurger 05      l



                                                        y [facility]

 

        2019 Phone                   nullFlavo MNA     [id number]

55 Memoria



        18:55:03 04:59:59 Message                 r       Neurosurger 05      l



                                                        y [facility]

 

        2019 Outpatient                 [facility]

[id number] 



        13:55:03 23:59:59                                               

 

        2018 Emergency                 [facility]Flavo [facility] 85016

60393 Memoria



        10:12:00 18:24:00                         r       [address]

 

        2018 Emergency                 [facility]Flavo [facility] [address]8210

82116 Memoria



        10:12:00 18:24:00                         r       [address]

 

        2018 Outpatient         Vijay [facility]   5510

[id number] 



        04:12:00 12:24:00                 Deisy Dial      

 

        2018 Outpatient                 [facility]

[id number] Common



        11:15:00 11:15:00                         [place]   [address]         Spir

it



                                                Drive   [facility]

 

        2018 Outpatient                 [facility]

[id number] Common



        11:30:00 11:30:00                         [place]   [address]         Spir

it



                                                Drive   [facility]

 

        2018 Outpatient                 [facility]

[id number] Common



        15:41:00 15:41:00                         [place]   [address]         Spir

it



                                                Drive   [facility]

 

        2018 Outpatient                 [facility]

[id number] Common



        15:42:00 15:42:00                         [place]   [address]         Spir

it



                                                Drive   [facility]

 

        2018 Outpatient                 [facility]

[id number] Common



        11:00:00 11:00:00                         [place]   [address]         Spir

it



                                                Drive   [facility]

 

        2018 Inpatient                 [facility] [address]6473

96362 Memoria



        22:40:00 17:28:00                         r       [address]

 

        2018 Inpatient                 [facility] [address]86

26975 Memoria



        22:40:00 17:28:00                         [address]

 

        2018 Outpatient         Dedeee Reinoso [facility]   551

[id number] 



        17:40:00 12:28:00                 Jamie                   23      







Results







           Test Description Test Time  Test Comments Results    Result Comments 

Source









                    POCT GLUCOSE (AUTOMATED) 2022 23:04:10 









                      Test Item  Value      Reference Range Interpretation Comme

nts









             POCT GLU (test code = 3558036528) 142 mg/dL           H      

      

 

             Lab Interpretation (test code = 34019-0) Abnormal                  

             



General acute hospital GLUCOSE (AUTOMATED)2022 18:07:54





             Test Item    Value        Reference Range Interpretation Comments

 

             POCT GLU (test code = 2451830921) 198 mg/dL           H      

      

 

             Lab Interpretation (test code = Abnormal                           

    



             37026-1)                                            



[facility] GLUCOSE (AUTOMATED)2022 13:54:10





             Test Item    Value        Reference Range Interpretation Comments

 

             POCT GLU (test code = 1915905503) 141 mg/dL           H      

      

 

             Lab Interpretation (test code = Abnormal                           

    



             05587-4)                                            



[facility]





             Test Item    Value        Reference Range Interpretation Comments

 

             POCT GLU (test code = 0470350964) 150 mg/dL           H      

      

 

             Lab Interpretation (test code = Abnormal                           

    



             47218-6)                                            



[facility] 
23:49:17





             Test Item    Value        Reference Range Interpretation Comments

 

             NA (test code = 139 mmol/L   135-145                   



             3616920610)                                         

 

             K (test code = 4.5 mmol/L   3.5-5.0                   



             5531076005)                                         

 

             CL (test code = 104 mmol/L                       



             6802900338)                                         

 

             CO2 TOTAL (test code = 25 mmol/L    23-31                     



             4686102558)                                         

 

             AGAP (test code =              2-16                      



             6932847160)                                         

 

             BUN (test code = 13 mg/dL     7-23                      



             9082067884)                                         

 

             GLUCOSE (test code = 228 mg/dL           H            



             5742629165)                                         

 

             CREATININE (test code = 0.56 mg/dL   0.60-1.25    L            



             8094941895)                                         

 

             TOTAL BILI (test code = 0.6 mg/dL    0.1-1.1                   



             6447478708)                                         

 

             CALCIUM (test code = 9.7 mg/dL    8.6-10.6                  



             5662770013)                                         

 

             T PROTEIN (test code = 7.8 g/dL     6.3-8.2                   



             0404869249)                                         

 

             ALBUMIN (test code = 4.4 g/dL     3.5-5.0                   



             1848779494)                                         

 

             ALK PHOS (test code = 132 U/L             H            



             0655053802)                                         

 

             ALTv (test code = 31 U/L       5-50                      



             1742-6)                                             

 

             AST(SGOT) (test code = 20 U/L       13-40                     



             9303079256)                                         

 

             eGFR (test code =              mL/min/1.73m2              



             7485985374)                                         

 

             MAL (test code = MAL) Association of                           



                          Glomerular Filtration                           



                          Rate (GFR) and Staging                           



                          of Kidney Disease*                           



                          +---------------------                           



                          --+-------------------                           



                          --+-------------------                           



                          ------+| GFR                           



                          (mL/min/1.73 m2) ?|                           



                          With Kidney Damage ?|                           



                          ?Without Kidney                           



                          Damage+---------------                           



                          --------+-------------                           



                          --------+-------------                           



                          ------------+| ?>90 ?                           



                          ? ? ? ? ? ? ? ?|                           



                          ?Stage one ? ? ? ? ?|                           



                          ? Normal ? ? ? ? ? ? ?                           



                          ?+--------------------                           



                          ---+------------------                           



                          ---+------------------                           



                          -------+| ?60-89 ? ? ?                           



                          ? ? ? ? ?| ?Stage two                           



                          ? ? ? ? ?| ? Decreased                           



                          GFR ? ? ? ?                            



                          +---------------------                           



                          --+-------------------                           



                          --+-------------------                           



                          ------+| ?30-59 ? ? ?                           



                          ? ? ? ? ?| ?Stage                           



                          three ? ? ? ?| ? Stage                           



                          three ? ? ? ? ?                           



                          +---------------------                           



                          --+-------------------                           



                          --+-------------------                           



                          ------+| ?15-29 ? ? ?                           



                          ? ? ? ? ?| ?Stage four                           



                          ? ? ? ? | ? Stage four                           



                          ? ? ? ? ?                              



                          ?+--------------------                           



                          ---+------------------                           



                          ---+------------------                           



                          -------+| ?<15 (or                           



                          dialysis) ? ?| ?Stage                           



                          five ? ? ? ? | ? Stage                           



                          five ? ? ? ? ?                           



                          ?+--------------------                           



                          ---+------------------                           



                          ---+------------------                           



                          -------+ *Each stage                           



                          assumes the associated                           



                          GFR level has been in                           



                          effect for at least                           



                          three months. ?Stages                           



                          1 to 5, with or                           



                          without kidney                           



                          disease, indicate                           



                          chronic kidney                           



                          disease. Notes:                           



                          Determination of                           



                          stages one and two                           



                          (with eGFR                             



                          >59mL/min/1.73 m2)                           



                          requires estimation of                           



                          kidney damage for at                           



                          least three months as                           



                          defined by structural                           



                          or functional                           



                          abnormalities of the                           



                          kidney, manifested by                           



                          either:Pathological                           



                          abnormalities or                           



                          Markers of kidney                           



                          damage (including                           



                          abnormalities in the                           



                          composition of the                           



                          blood or urine or                           



                          abnormalities in                           



                          imaging tests).                           

 

             Lab Interpretation Abnormal                               



             (test code = 92524-8)                                        



[facility] WITH [id number] 23:45:35





             Test Item    Value        Reference Range Interpretation Comments

 

             WBC (test code =              See_Comment                [Automated



             3970-2)                                             message] The sy

stem



                                                                 which generated



                                                                 this result



                                                                 transmitted



                                                                 reference range

:



                                                                 4.20 - 10.70



                                                                 10*3/?L. The



                                                                 reference range

 was



                                                                 not used to



                                                                 interpret this



                                                                 result as



                                                                 normal/abnormal

.

 

             RBC (test code =              See_Comment  H             [Automated



             843-8)                                              message] The sy

stem



                                                                 which generated



                                                                 this result



                                                                 transmitted



                                                                 reference range

:



                                                                 4.26 - 5.52



                                                                 10*6/?L. The



                                                                 reference range

 was



                                                                 not used to



                                                                 interpret this



                                                                 result as



                                                                 normal/abnormal

.

 

             HGB (test code = 16.4 g/dL    12.2-16.4                 



             718-7)                                              

 

             HCT (test code = 49.3 %       38.4-49.3                 



             4544-3)                                             

 

             MCV (test code = 83.6 fL      81.7-95.6                 



             787-2)                                              

 

             MCH (test code = 27.8 pg      26.1-32.7                 



             785-6)                                              

 

             MCHC (test code = 33.3 g/dL    31.2-35.0                 



             786-4)                                              

 

             RDW-SD (test code = 45.1 fL      38.5-51.6                 



             11638-7)                                            

 

             RDW-CV (test code = 14.9 %       12.1-15.4                 



             788-0)                                              

 

             PLT (test code =              See_Comment                [Automated



             777-3)                                              message] The sy

stem



                                                                 which generated



                                                                 this result



                                                                 transmitted



                                                                 reference range

:



                                                                 150 - 328 10*3/

?L.



                                                                 The reference r

zhen



                                                                 was not used to



                                                                 interpret this



                                                                 result as



                                                                 normal/abnormal

.

 

             MPV (test code = 10.9 fL      9.8-13.0                  



             07248-2)                                            

 

             IPF % (test code = 9.9 %        1.2-10.7                  Platelet 

count



             [id number])                                         measured by



                                                                 fluorescence



                                                                 method.

 

             NRBC/100 WBC (test              See_Comment                [Automat

ed



             code = 6483427758)                                        message] 

The system



                                                                 which generated



                                                                 this result



                                                                 transmitted



                                                                 reference range

:



                                                                 0.0 - 10.0 /100



                                                                 WBCs. The refer

ence



                                                                 range was not u

sed



                                                                 to interpret th

is



                                                                 result as



                                                                 normal/abnormal

.

 

             NRBC x10^3 (test code              See_Comment                [Auto

mated



             = 5776023123)                                        message] The s

ystem



                                                                 which generated



                                                                 this result



                                                                 transmitted



                                                                 reference range

:



                                                                 10*3/?L. The



                                                                 reference range

 was



                                                                 not used to



                                                                 interpret this



                                                                 result as



                                                                 normal/abnormal

.

 

             GRAN MAT (NEUT) % 65.4 %                                 



             (test code = 770-8)                                        

 

             IMM GRAN % (test code 0.60 %                                 



             = 3206197612)                                        

 

             LYMPH % (test code = 23.1 %                                 



             736-9)                                              

 

             MONO % (test code = 7.9 %                                  



             5905-5)                                             

 

             EOS % (test code = 2.6 %                                  



             713-8)                                              

 

             BASO % (test code = 0.4 %                                  



             706-2)                                              

 

             GRAN MAT x10^3(ANC) 6.34 10*3/uL 1.99-6.95                 



             (test code =                                        



             6187105115)                                         

 

             IMM GRAN x10^3 (test 0.06 10*3/uL 0.00-0.06                 



             code = 9057729733)                                        

 

             LYMPH x10^3 (test code 2.24 10*3/uL 1.09-3.23                 



             = 731-0)                                            

 

             MONO x10^3 (test code 0.77 10*3/uL 0.36-1.02                 



             = 742-7)                                            

 

             EOS x10^3 (test code = 0.25 10*3/uL 0.06-0.53                 



             711-2)                                              

 

             BASO x10^3 (test code 0.04 10*3/uL 0.01-0.09                 



             = 704-7)                                            

 

             Lab Interpretation Abnormal                               



             (test code = 83662-7)                                        



[id number]16:59





             Test Item    Value        Reference Range Interpretation Comments

 

             LACTIC ACID (test code = 3.00 mmol/L  0.50-2.20    H            



             6779550722)                                         

 

             Lab Interpretation (test code = Abnormal                           

    



             39662-0)                                            



[facility] WITH DIFF2022-10-30 01:28:46





             Test Item    Value        Reference Range Interpretation Comments

 

             WBC (test code =              See_Comment                [Automated



             6690-2)                                             message] The sy

stem



                                                                 which generated



                                                                 this result



                                                                 transmitted



                                                                 reference range

:



                                                                 4.20 - 10.70



                                                                 10*3/?L. The



                                                                 reference range

 was



                                                                 not used to



                                                                 interpret this



                                                                 result as



                                                                 normal/abnormal

.

 

             RBC (test code =              See_Comment  H             [Automated



             789-8)                                              message] The sy

stem



                                                                 which generated



                                                                 this result



                                                                 transmitted



                                                                 reference range

:



                                                                 4.26 - 5.52



                                                                 10*6/?L. The



                                                                 reference range

 was



                                                                 not used to



                                                                 interpret this



                                                                 result as



                                                                 normal/abnormal

.

 

             HGB (test code = 17.4 g/dL    12.2-16.4    H            



             718-7)                                              

 

             HCT (test code = 52.0 %       38.4-49.3    H            



             4544-3)                                             

 

             MCV (test code = 85.5 fL      81.7-95.6                 



             787-2)                                              

 

             MCH (test code = 28.6 pg      26.1-32.7                 



             785-6)                                              

 

             MCHC (test code = 33.5 g/dL    31.2-35.0                 



             786-4)                                              

 

             RDW-SD (test code = 47.6 fL      38.5-51.6                 



             31464-5)                                            

 

             RDW-CV (test code = 15.4 %       12.1-15.4                 



             788-0)                                              

 

             PLT (test code =              See_Comment  L             [Automated



             777-3)                                              message] The sy

stem



                                                                 which generated



                                                                 this result



                                                                 transmitted



                                                                 reference range

:



                                                                 150 - 328 10*3/

?L.



                                                                 The reference r

zhen



                                                                 was not used to



                                                                 interpret this



                                                                 result as



                                                                 normal/abnormal

.

 

             MPV (test code = 10.6 fL      9.8-13.0                  



             24504-9)                                            

 

             NRBC/100 WBC (test              See_Comment                [Automat

ed



             code = 4319879586)                                        message] 

The system



                                                                 which generated



                                                                 this result



                                                                 transmitted



                                                                 reference range

:



                                                                 0.0 - 10.0 /100



                                                                 WBCs. The refer

ence



                                                                 range was not u

sed



                                                                 to interpret th

is



                                                                 result as



                                                                 normal/abnormal

.

 

             NRBC x10^3 (test code              See_Comment                [Auto

mated



             = 5785002507)                                        message] The s

ystem



                                                                 which generated



                                                                 this result



                                                                 transmitted



                                                                 reference range

:



                                                                 10*3/?L. The



                                                                 reference range

 was



                                                                 not used to



                                                                 interpret this



                                                                 result as



                                                                 normal/abnormal

.

 

             GRAN MAT (NEUT) % 70.8 %                                 



             (test code = 770-8)                                        

 

             IMM GRAN % (test code 0.50 %                                 



             = 7623014138)                                        

 

             LYMPH % (test code = 20.4 %                                 



             736-9)                                              

 

             MONO % (test code = 5.9 %                                  



             5905-5)                                             

 

             EOS % (test code = 2.0 %                                  



             713-8)                                              

 

             BASO % (test code = 0.4 %                                  



             706-2)                                              

 

             GRAN MAT x10^3(ANC) 7.23 10*3/uL 1.99-6.95    H            



             (test code =                                        



             3321307314)                                         

 

             IMM GRAN x10^3 (test 0.05 10*3/uL 0.00-0.06                 



             code = 0572239441)                                        

 

             LYMPH x10^3 (test code 2.08 10*3/uL 1.09-3.23                 



             = 731-0)                                            

 

             MONO x10^3 (test code 0.60 10*3/uL 0.36-1.02                 



             = 742-7)                                            

 

             EOS x10^3 (test code = 0.20 10*3/uL 0.06-0.53                 



             711-2)                                              

 

             BASO x10^3 (test code 0.04 10*3/uL 0.01-0.09                 



             = 704-7)                                            

 

             Lab Interpretation Abnormal                               



             (test code = 32040-6)                                        



[facility]. METABOLIC PANEL ([id number])2022-10-30 
01:23:44





             Test Item    Value        Reference Range Interpretation Comments

 

             NA (test code = 141 mmol/L   135-145                   



             5361175654)                                         

 

             K (test code = 4.7 mmol/L   3.5-5.0                   



             0287616924)                                         

 

             CL (test code = 107 mmol/L                       



             1088247895)                                         

 

             CO2 TOTAL (test code = 23 mmol/L    23-31                     



             4074265883)                                         

 

             AGAP (test code =              2-16                      



             6078838601)                                         

 

             BUN (test code = 16 mg/dL     7-23                      



             3063985954)                                         

 

             GLUCOSE (test code = 144 mg/dL           H            



             3657272834)                                         

 

             CREATININE (test code = 0.54 mg/dL   0.60-1.25    L            



             7493953340)                                         

 

             TOTAL BILI (test code = 0.6 mg/dL    0.1-1.1                   



             7804550103)                                         

 

             CALCIUM (test code = 9.5 mg/dL    8.6-10.6                  



             4328567791)                                         

 

             T PROTEIN (test code = 7.7 g/dL     6.3-8.2                   



             8379294468)                                         

 

             ALBUMIN (test code = 4.4 g/dL     3.5-5.0                   



             2062501787)                                         

 

             ALK PHOS (test code = 101 U/L                          



             6469127437)                                         

 

             ALTv (test code = 28 U/L       5-50                      



             2-6)                                             

 

             AST(SGOT) (test code = 20 U/L       13-40                     



             7492108905)                                         

 

             eGFR (test code =              mL/min/1.73m2              



             4003705492)                                         

 

             MAL (test code = MAL) Association of                           



                          Glomerular Filtration                           



                          Rate (GFR) and Staging                           



                          of Kidney Disease*                           



                          +---------------------                           



                          --+-------------------                           



                          --+-------------------                           



                          ------+| GFR                           



                          (mL/min/1.73 m2) ?|                           



                          With Kidney Damage ?|                           



                          ?Without Kidney                           



                          Damage+---------------                           



                          --------+-------------                           



                          --------+-------------                           



                          ------------+| ?>90 ?                           



                          ? ? ? ? ? ? ? ?|                           



                          ?Stage one ? ? ? ? ?|                           



                          ? Normal ? ? ? ? ? ? ?                           



                          ?+--------------------                           



                          ---+------------------                           



                          ---+------------------                           



                          -------+| ?60-89 ? ? ?                           



                          ? ? ? ? ?| ?Stage two                           



                          ? ? ? ? ?| ? Decreased                           



                          GFR ? ? ? ?                            



                          +---------------------                           



                          --+-------------------                           



                          --+-------------------                           



                          ------+| ?30-59 ? ? ?                           



                          ? ? ? ? ?| ?Stage                           



                          three ? ? ? ?| ? Stage                           



                          three ? ? ? ? ?                           



                          +---------------------                           



                          --+-------------------                           



                          --+-------------------                           



                          ------+| ?15-29 ? ? ?                           



                          ? ? ? ? ?| ?Stage four                           



                          ? ? ? ? | ? Stage four                           



                          ? ? ? ? ?                              



                          ?+--------------------                           



                          ---+------------------                           



                          ---+------------------                           



                          -------+| ?<15 (or                           



                          dialysis) ? ?| ?Stage                           



                          five ? ? ? ? | ? Stage                           



                          five ? ? ? ? ?                           



                          ?+--------------------                           



                          ---+------------------                           



                          ---+------------------                           



                          -------+ *Each stage                           



                          assumes the associated                           



                          GFR level has been in                           



                          effect for at least                           



                          three months. ?Stages                           



                          1 to 5, with or                           



                          without kidney                           



                          disease, indicate                           



                          chronic kidney                           



                          disease. Notes:                           



                          Determination of                           



                          stages one and two                           



                          (with eGFR                             



                          >59mL/min/1.73 m2)                           



                          requires estimation of                           



                          kidney damage for at                           



                          least three months as                           



                          defined by structural                           



                          or functional                           



                          abnormalities of the                           



                          kidney, manifested by                           



                          either:Pathological                           



                          abnormalities or                           



                          Markers of kidney                           



                          damage (including                           



                          abnormalities in the                           



                          composition of the                           



                          blood or urine or                           



                          abnormalities in                           



                          imaging tests).                           

 

             Lab Interpretation Abnormal                               



             (test code = 58791-7)                                        



[facility]





             Test Item    Value        Reference Range Interpretation Comments

 

             LIPASE (test code = 8442554778) 99 U/L       0-220                 

    

 

             Lab Interpretation (test code = Normal                             

    



             55005-0)                                            



[facility] GLUCOSE (AUTOMATED)2022-10-23 13:16:26





             Test Item    Value        Reference Range Interpretation Comments

 

             POCT GLU (test code = 6625735059) 162 mg/dL           H      

      

 

             Lab Interpretation (test code = Abnormal                           

    



             63298-5)                                            



[facility] hospital GLUCOSE (AUTOMATED)2022-10-23 01:23:12





             Test Item    Value        Reference Range Interpretation Comments

 

             POCT GLU (test code = 7539520208) 248 mg/dL           H      

      

 

             Lab Interpretation (test code = Abnormal                           

    



             46460-5)                                            



[facility] GLUCOSE (AUTOMATED)2022-10-22 21:32:18





             Test Item    Value        Reference Range Interpretation Comments

 

             POCT GLU (test code = 4207422939) 239 mg/dL           H      

      

 

             Lab Interpretation (test code = Abnormal                           

    



             78413-3)                                            



[facility] hospital GLUCOSE (AUTOMATED)2022-10-22 01:49:48





             Test Item    Value        Reference Range Interpretation Comments

 

             POCT GLU (test code = 9854690773) 317 mg/dL           H      

      

 

             Lab Interpretation (test code = Abnormal                           

    



             19683-4)                                            



[facility] GLUCOSE (AUTOMATED)2022-10-21 21:38:03





             Test Item    Value        Reference Range Interpretation Comments

 

             POCT GLU (test code = 6564514084) 139 mg/dL           H      

      

 

             Lab Interpretation (test code = Abnormal                           

    



             08232-4)                                            



[facility] hospital GLUCOSE (AUTOMATED)2022-10-21 16:14:31





             Test Item    Value        Reference Range Interpretation Comments

 

             POCT GLU (test code = 5708242547) 164 mg/dL           H      

      

 

             Lab Interpretation (test code = Abnormal                           

    



             87858-5)                                            



[facility] 12[facility]46:40





             Test Item    Value        Reference Range Interpretation Comments

 

             POCT GLU (test code = 7292256747) 154 mg/dL           H      

      

 

             Lab Interpretation (test code = Abnormal                           

    



             28731-4)                                            



[facility]52





             Test Item    Value        Reference Range Interpretation Comments

 

             LIPASE (test code = 4276488840) 216 U/L      0-220                 

    

 

             Lab Interpretation (test code = Normal                             

    



             60261-2)                                            



[facility]7624[facility] 
06:39:52





             Test Item    Value        Reference Range Interpretation Comments

 

             NA (test code = 139 mmol/L   135-145                   



             5114906630)                                         

 

             K (test code = 4.7 mmol/L   3.5-5                     



             5036907608)                                         

 

             CL (test code = 106 mmol/L                       



             1293934838)                                         

 

             CO2 TOTAL (test code = 20 mmol/L    23-31        L            



             7516378493)                                         

 

             AGAP (test code =              2-16                      



             7944843498)                                         

 

             BUN (test code = 16 mg/dL     7-23                      



             4599402637)                                         

 

             GLUCOSE (test code = 224 mg/dL           H            



             2106773608)                                         

 

             CREATININE (test code = 0.72 mg/dL   0.6-1.25                  



             3155169211)                                         

 

             TOTAL BILI (test code = 0.4 mg/dL    0.1-1.1                   



             5030105695)                                         

 

             CALCIUM (test code = 9.3 mg/dL    8.6-10.6                  



             3674938194)                                         

 

             T PROTEIN (test code = 7.2 g/dL     6.3-8.2                   



             0587721958)                                         

 

             ALBUMIN (test code = 4.1 g/dL     3.5-5                     



             9864961425)                                         

 

             ALK PHOS (test code = 118 U/L                          



             9242783409)                                         

 

             ALTv (test code = 46 U/L       5-50                      



             1742-6)                                             

 

             AST(SGOT) (test code = 18 U/L       13-40                     



             4898076919)                                         

 

             eGFR (test code =              mL/min/1.73m2              



             8019031525)                                         

 

             MAL (test code = MAL) Association of                           



                          Glomerular Filtration                           



                          Rate (GFR) and Staging                           



                          of Kidney Disease*                           



                          +---------------------                           



                          --+-------------------                           



                          --+-------------------                           



                          ------+| GFR                           



                          (mL/min/1.73 m2) ?|                           



                          With Kidney Damage ?|                           



                          ?Without Kidney                           



                          Damage+---------------                           



                          --------+-------------                           



                          --------+-------------                           



                          ------------+| ?>90 ?                           



                          ? ? ? ? ? ? ? ?|                           



                          ?Stage one ? ? ? ? ?|                           



                          ? Normal ? ? ? ? ? ? ?                           



                          ?+--------------------                           



                          ---+------------------                           



                          ---+------------------                           



                          -------+| ?60-89 ? ? ?                           



                          ? ? ? ? ?| ?Stage two                           



                          ? ? ? ? ?| ? Decreased                           



                          GFR ? ? ? ?                            



                          +---------------------                           



                          --+-------------------                           



                          --+-------------------                           



                          ------+| ?30-59 ? ? ?                           



                          ? ? ? ? ?| ?Stage                           



                          three ? ? ? ?| ? Stage                           



                          three ? ? ? ? ?                           



                          +---------------------                           



                          --+-------------------                           



                          --+-------------------                           



                          ------+| ?15-29 ? ? ?                           



                          ? ? ? ? ?| ?Stage four                           



                          ? ? ? ? | ? Stage four                           



                          ? ? ? ? ?                              



                          ?+--------------------                           



                          ---+------------------                           



                          ---+------------------                           



                          -------+| ?<15 (or                           



                          dialysis) ? ?| ?Stage                           



                          five ? ? ? ? | ? Stage                           



                          five ? ? ? ? ?                           



                          ?+--------------------                           



                          ---+------------------                           



                          ---+------------------                           



                          -------+ *Each stage                           



                          assumes the associated                           



                          GFR level has been in                           



                          effect for at least                           



                          three months. ?Stages                           



                          1 to 5, with or                           



                          without kidney                           



                          disease, indicate                           



                          chronic kidney                           



                          disease. Notes:                           



                          Determination of                           



                          stages one and two                           



                          (with eGFR                             



                          >59mL/min/1.73 m2)                           



                          requires estimation of                           



                          kidney damage for at                           



                          least three months as                           



                          defined by structural                           



                          or functional                           



                          abnormalities of the                           



                          kidney, manifested by                           



                          either:Pathological                           



                          abnormalities or                           



                          Markers of kidney                           



                          damage (including                           



                          abnormalities in the                           



                          composition of the                           



                          blood or urine or                           



                          abnormalities in                           



                          imaging tests).                           

 

             Lab Interpretation Abnormal                               



             (test code = 74353-0)                                        



[facility] WITH DIFF2022-10-21 06:28:46





             Test Item    Value        Reference Range Interpretation Comments

 

             WBC (test code =              See_Comment                [Automated



             7754-2)                                             message] The sy

stem



                                                                 which generated



                                                                 this result



                                                                 transmitted



                                                                 reference range

:



                                                                 4.20 - 10.70



                                                                 10*3/?L. The



                                                                 reference range

 was



                                                                 not used to



                                                                 interpret this



                                                                 result as



                                                                 normal/abnormal

.

 

             RBC (test code =              See_Comment                [Automated



             714-9)                                              message] The sy

stem



                                                                 which generated



                                                                 this result



                                                                 transmitted



                                                                 reference range

:



                                                                 4.26 - 5.52



                                                                 10*6/?L. The



                                                                 reference range

 was



                                                                 not used to



                                                                 interpret this



                                                                 result as



                                                                 normal/abnormal

.

 

             HGB (test code = 15.6 g/dL    12.2-16.4                 



             718-7)                                              

 

             HCT (test code = 46.3 %       38.4-49.3                 



             4544-3)                                             

 

             MCV (test code = 84.6 fL      81.7-95.6                 



             787-2)                                              

 

             MCH (test code = 28.5 pg      26.1-32.7                 



             785-6)                                              

 

             MCHC (test code = 33.7 g/dL    31.2-35                   



             786-4)                                              

 

             RDW-SD (test code = 45.7 fL      38.5-51.6                 



             64517-0)                                            

 

             RDW-CV (test code = 14.8 %       12.1-15.4                 



             788-0)                                              

 

             PLT (test code =              See_Comment  H             [Automated



             777-3)                                              message] The sy

stem



                                                                 which generated



                                                                 this result



                                                                 transmitted



                                                                 reference range

:



                                                                 150 - 328 10*3/

?L.



                                                                 The reference r

zhen



                                                                 was not used to



                                                                 interpret this



                                                                 result as



                                                                 normal/abnormal

.

 

             MPV (test code = 11.0 fL      9.8-13                    



             39930-5)                                            

 

             NRBC/100 WBC (test              See_Comment                [Automat

ed



             code = 9827782539)                                        message] 

The system



                                                                 which generated



                                                                 this result



                                                                 transmitted



                                                                 reference range

:



                                                                 0.0 - 10.0 /100



                                                                 WBCs. The refer

ence



                                                                 range was not u

sed



                                                                 to interpret th

is



                                                                 result as



                                                                 normal/abnormal

.

 

             NRBC x10^3 (test code              See_Comment                [Auto

mated



             = 8139552774)                                        message] The s

ystem



                                                                 which generated



                                                                 this result



                                                                 transmitted



                                                                 reference range

:



                                                                 10*3/?L. The



                                                                 reference range

 was



                                                                 not used to



                                                                 interpret this



                                                                 result as



                                                                 normal/abnormal

.

 

             GRAN MAT (NEUT) % 56.7 %                                 



             (test code = 770-8)                                        

 

             IMM GRAN % (test code 0.70 %                                 



             = 7295156879)                                        

 

             LYMPH % (test code = 31.5 %                                 



             736-9)                                              

 

             MONO % (test code = 8.6 %                                  



             5905-5)                                             

 

             EOS % (test code = 2.0 %                                  



             713-8)                                              

 

             BASO % (test code = 0.5 %                                  



             706-2)                                              

 

             GRAN MAT x10^3(ANC) 4.84 10*3/uL 1.99-6.95                 



             (test code =                                        



             7067129845)                                         

 

             IMM GRAN x10^3 (test 0.06 10*3/uL 0-0.06                    



             code = 9536556495)                                        

 

             LYMPH x10^3 (test code 2.69 10*3/uL 1.09-3.23                 



             = 731-0)                                            

 

             MONO x10^3 (test code 0.73 10*3/uL 0.36-1.02                 



             = 742-7)                                            

 

             EOS x10^3 (test code = 0.17 10*3/uL 0.06-0.53                 



             711-2)                                              

 

             BASO x10^3 (test code 0.04 10*3/uL 0.01-0.09                 



             = 704-7)                                            

 

             Lab Interpretation Abnormal                               



             (test code = 08576-3)                                        



[facility] CULTURE(AEROBIC/ANAEROBIC)2022 
20:34:19





             Test Item    Value        Reference Range Interpretation Comments

 

             TISSUE CULTURE (test No aerobic/anaerobic                          

 



             code = 20474-3) organisms isolated                           

 

             Gram stain (test code No PMNs or Mononuclear                       

    



             = 664-3)     cells observed                           



[facility] GLUCOSE (AUTOMATED)2022 18:36:41





             Test Item    Value        Reference Range Interpretation Comments

 

             POCT GLU (test code = 3250571955) 162 mg/dL           H      

      

 

             Lab Interpretation (test code = Abnormal                           

    



             69459-3)                                            



[facility] GLUCOSE (AUTOMATED)2022 14:48:29





             Test Item    Value        Reference Range Interpretation Comments

 

             POCT GLU (test code = 2530120148) 191 mg/dL           H      

      

 

             Lab Interpretation (test code = Abnormal                           

    



             29274-2)                                            



[facility] GLUCOSE (AUTOMATED)2022 10:56:47





             Test Item    Value        Reference Range Interpretation Comments

 

             POCT GLU (test code = 5105845211) 242 mg/dL           H      

      

 

             Lab Interpretation (test code = Abnormal                           

    



             00277-9)                                            



[facility] GLUCOSE (AUTOMATED)2022 05:47:30





             Test Item    Value        Reference Range Interpretation Comments

 

             POCT GLU (test code = 7218078705) 327 mg/dL           H      

      

 

             Lab Interpretation (test code = Abnormal                           

    



             56156-7)                                            



[facility] GLUCOSE (AUTOMATED)2022 02:18:34





             Test Item    Value        Reference Range Interpretation Comments

 

             POCT GLU (test code = 9452393806) 309 mg/dL           H      

      

 

             Lab Interpretation (test code = Abnormal                           

    



             61030-1)                                            



[facility] GLUCOSE (AUTOMATED)2022 23:17:38





             Test Item    Value        Reference Range Interpretation Comments

 

             POCT GLU (test code = 0335130636) 260 mg/dL           H      

      

 

             Lab Interpretation (test code = Abnormal                           

    



             79447-4)                                            



[facility] GLUCOSE (AUTOMATED)2022 18:33:41





             Test Item    Value        Reference Range Interpretation Comments

 

             POCT GLU (test code = 9623474622) 264 mg/dL           H      

      

 

             Lab Interpretation (test code = Abnormal                           

    



             19974-8)                                            



[facility] GLUCOSE (AUTOMATED)2022 15:02:50





             Test Item    Value        Reference Range Interpretation Comments

 

             POCT GLU (test code = 8428634060) 219 mg/dL           H      

      

 

             Lab Interpretation (test code = Abnormal                           

    



             62642-1)                                            



[facility] METABOLIC PANEL (NA, K, CL, CO2, 
GLUCOSE, BUN, CREATININE, CA)2022 10:44:27





             Test Item    Value        Reference Range Interpretation Comments

 

             NA (test code = 132 mmol/L   135-145      L            



             6024185446)                                         

 

             K (test code = 4.4 mmol/L   3.5-5                     



             8972500350)                                         

 

             CL (test code = 103 mmol/L                       



             2918008518)                                         

 

             CO2 TOTAL (test code = 25 mmol/L    23-31                     



             7642425272)                                         

 

             AGAP (test code =              2-16                      



             9781000550)                                         

 

             BUN (test code = 15 mg/dL     7-23                      



             5494174000)                                         

 

             GLUCOSE (test code = 262 mg/dL           H            



             9655853941)                                         

 

             CREATININE (test code = 0.52 mg/dL   0.6-1.25     L            



             2820171212)                                         

 

             CALCIUM (test code = 8.3 mg/dL    8.6-10.6     L            



             2031152677)                                         

 

             eGFR (test code =              mL/min/1.73m2              



             3171297581)                                         

 

             MAL (test code = MAL) Association of                           



                          Glomerular Filtration                           



                          Rate (GFR) and Staging                           



                          of Kidney Disease*                           



                          +---------------------                           



                          --+-------------------                           



                          --+-------------------                           



                          ------+| GFR                           



                          (mL/min/1.73 m2) ?|                           



                          With Kidney Damage ?|                           



                          ?Without Kidney                           



                          Damage+---------------                           



                          --------+-------------                           



                          --------+-------------                           



                          ------------+| ?>90 ?                           



                          ? ? ? ? ? ? ? ?|                           



                          ?Stage one ? ? ? ? ?|                           



                          ? Normal ? ? ? ? ? ? ?                           



                          ?+--------------------                           



                          ---+------------------                           



                          ---+------------------                           



                          -------+| ?60-89 ? ? ?                           



                          ? ? ? ? ?| ?Stage two                           



                          ? ? ? ? ?| ? Decreased                           



                          GFR ? ? ? ?                            



                          +---------------------                           



                          --+-------------------                           



                          --+-------------------                           



                          ------+| ?30-59 ? ? ?                           



                          ? ? ? ? ?| ?Stage                           



                          three ? ? ? ?| ? Stage                           



                          three ? ? ? ? ?                           



                          +---------------------                           



                          --+-------------------                           



                          --+-------------------                           



                          ------+| ?15-29 ? ? ?                           



                          ? ? ? ? ?| ?Stage four                           



                          ? ? ? ? | ? Stage four                           



                          ? ? ? ? ?                              



                          ?+--------------------                           



                          ---+------------------                           



                          ---+------------------                           



                          -------+| ?<15 (or                           



                          dialysis) ? ?| ?Stage                           



                          five ? ? ? ? | ? Stage                           



                          five ? ? ? ? ?                           



                          ?+--------------------                           



                          ---+------------------                           



                          ---+------------------                           



                          -------+ *Each stage                           



                          assumes the associated                           



                          GFR level has been in                           



                          effect for at least                           



                          three months. ?Stages                           



                          1 to 5, with or                           



                          without kidney                           



                          disease, indicate                           



                          chronic kidney                           



                          disease. Notes:                           



                          Determination of                           



                          stages one and two                           



                          (with eGFR                             



                          >59mL/min/1.73 m2)                           



                          requires estimation of                           



                          kidney damage for at                           



                          least three months as                           



                          defined by structural                           



                          or functional                           



                          abnormalities of the                           



                          kidney, manifested by                           



                          either:Pathological                           



                          abnormalities or                           



                          Markers of kidney                           



                          damage (including                           



                          abnormalities in the                           



                          composition of the                           



                          blood or urine or                           



                          abnormalities in                           



                          imaging tests).                           

 

             Lab Interpretation Abnormal                               



             (test code = 69673-8)                                        



[facility] WITH [id number] 10:22:05





             Test Item    Value        Reference Range Interpretation Comments

 

             WBC (test code =              See_Comment                [Automated



             6690-2)                                             message] The sy

stem



                                                                 which generated



                                                                 this result



                                                                 transmitted



                                                                 reference range

:



                                                                 4.20 - 10.70



                                                                 10*3/?L. The



                                                                 reference range

 was



                                                                 not used to



                                                                 interpret this



                                                                 result as



                                                                 normal/abnormal

.

 

             RBC (test code =              See_Comment  L             [Automated



             789-8)                                              message] The sy

stem



                                                                 which generated



                                                                 this result



                                                                 transmitted



                                                                 reference range

:



                                                                 4.26 - 5.52



                                                                 10*6/?L. The



                                                                 reference range

 was



                                                                 not used to



                                                                 interpret this



                                                                 result as



                                                                 normal/abnormal

.

 

             HGB (test code = 10.5 g/dL    12.2-16.4    L            



             718-7)                                              

 

             HCT (test code = 31.2 %       38.4-49.3    L            



             4544-3)                                             

 

             MCV (test code = 88.9 fL      81.7-95.6                 



             787-2)                                              

 

             MCH (test code = 29.9 pg      26.1-32.7                 



             785-6)                                              

 

             MCHC (test code = 33.7 g/dL    31.2-35                   



             786-4)                                              

 

             RDW-SD (test code = 49.8 fL      38.5-51.6                 



             68246-1)                                            

 

             RDW-CV (test code = 15.9 %       12.1-15.4    H            



             788-0)                                              

 

             PLT (test code =              See_Comment  H             [Automated



             777-3)                                              message] The sy

stem



                                                                 which generated



                                                                 this result



                                                                 transmitted



                                                                 reference range

:



                                                                 150 - 328 10*3/

?L.



                                                                 The reference r

zhen



                                                                 was not used to



                                                                 interpret this



                                                                 result as



                                                                 normal/abnormal

.

 

             MPV (test code = 10.4 fL      9.8-13                    



             67726-2)                                            

 

             NRBC/100 WBC (test              See_Comment                [Automat

ed



             code = 6524085495)                                        message] 

The system



                                                                 which generated



                                                                 this result



                                                                 transmitted



                                                                 reference range

:



                                                                 0.0 - 10.0 /100



                                                                 WBCs. The refer

ence



                                                                 range was not u

sed



                                                                 to interpret th

is



                                                                 result as



                                                                 normal/abnormal

.

 

             NRBC x10^3 (test code              See_Comment                [Auto

mated



             = 7786426837)                                        message] The s

ystem



                                                                 which generated



                                                                 this result



                                                                 transmitted



                                                                 reference range

:



                                                                 10*3/?L. The



                                                                 reference range

 was



                                                                 not used to



                                                                 interpret this



                                                                 result as



                                                                 normal/abnormal

.

 

             GRAN MAT (NEUT) % 59.8 %                                 



             (test code = 770-8)                                        

 

             IMM GRAN % (test code 1.20 %                                 



             = 2644331311)                                        

 

             LYMPH % (test code = 28.2 %                                 



             736-9)                                              

 

             MONO % (test code = 8.4 %                                  



             5905-5)                                             

 

             EOS % (test code = 2.2 %                                  



             713-8)                                              

 

             BASO % (test code = 0.2 %                                  



             706-2)                                              

 

             GRAN MAT x10^3(ANC) 5.34 10*3/uL 1.99-6.95                 



             (test code =                                        



             8967998802)                                         

 

             IMM GRAN x10^3 (test 0.11 10*3/uL 0-0.06       H            



             code = 8345785338)                                        

 

             LYMPH x10^3 (test code 2.52 10*3/uL 1.09-3.23                 



             = 731-0)                                            

 

             MONO x10^3 (test code 0.75 10*3/uL 0.36-1.02                 



             = 742-7)                                            

 

             EOS x10^3 (test code = 0.20 10*3/uL 0.06-0.53                 



             711-2)                                              

 

             BASO x10^3 (test code              0.01-0.09                 



             = 704-7)                                            

 

             Lab Interpretation Abnormal                               



             (test code = 40739-4)                                        



[facility] GLU[facility]SE (AUTOMATED)2022 02:20:10





             Test Item    Value        Reference Range Interpretation Comments

 

             POCT GLU (test code = 5118236316) 281 mg/dL           H      

      

 

             Lab Interpretation (test code = Abnormal                           

    



             86224-0)                                            



[facility] acute hospital GLUCOSE (AUTOMATED)2022-08-10 22:27:37





             Test Item    Value        Reference Range Interpretation Comments

 

             POCT GLU (test code = 0949789698) 187 mg/dL           H      

      

 

             Lab Interpretation (test code = Abnormal                           

    



             52140-0)                                            



[facility] acute hospital GLUCOSE (AUTOMATED)2022-08-10 19:00:16





             Test Item    Value        Reference Range Interpretation Comments

 

             POCT GLU (test code = 3550246043) 167 mg/dL           H      

      

 

             Lab Interpretation (test code = Abnormal                           

    



             60256-4)                                            



General acute hospital GLUCOSE (AUTOMATED)2022-08-10 19:00:16





             Test Item    Value        Reference Range Interpretation Comments

 

             POCT GLU (test code = 9928225832) 167 mg/dL           H      

      

 

             Lab Interpretation (test code = Abnormal                           

    



             61649-3)                                            



[facility] acute hospital GLUCOSE (AUTOMATED)2022-08-10 15:22:04





             Test Item    Value        Reference Range Interpretation Comments

 

             POCT GLU (test code = 7257320580) 138 mg/dL           H      

      

 

             Lab Interpretation (test code = Abnormal                           

    



             01883-6)                                            



[facility] acute hospital GLUCOSE (AUTOMATED)2022-08-10 15:22:04





             Test Item    Value        Reference Range Interpretation Comments

 

             POCT GLU (test code = 0188597464) 138 mg/dL           H      

      

 

             Lab Interpretation (test code = Abnormal                           

    



             81587-9)                                            



[facility] acute hospital GLUCOSE (AUTOMATED)2022-08-10 02:45:31





             Test Item    Value        Reference Range Interpretation Comments

 

             POCT GLU (test code = 8514723276) 142 mg/dL           H      

      

 

             Lab Interpretation (test code = Abnormal                           

    



             19623-1)                                            



[facility] acute hospital GLUCOSE (AUTOMATED)2022-08-10 02:45:31





             Test Item    Value        Reference Range Interpretation Comments

 

             POCT GLU (test code = 3685406813) 142 mg/dL           H      

      

 

             Lab Interpretation (test code = Abnormal                           

    



             16616-0)                                            



General acute hospital GLUCOSE (AUTOMATED)2022 22:04:41





             Test Item    Value        Reference Range Interpretation Comments

 

             POCT GLU (test code = 1413153758) 260 mg/dL           H      

      

 

             Lab Interpretation (test code = Abnormal                           

    



             53655-2)                                            



General acute hospital GLUCOSE (AUTOMATED)2022 22:04:41





             Test Item    Value        Reference Range Interpretation Comments

 

             POCT GLU (test code = 7330560639) 260 mg/dL           H      

      

 

             Lab Interpretation (test code = Abnormal                           

    



             23416-2)                                            



[facility] Culture - Peripheral Vein #  
21:01:35





             Test Item    Value        Reference Range Interpretation Comments

 

             Blood Culture-Aerobic No organisms No growth                 Previo

us



             (test code = 17928-3) isolated                               prelim

inary



                                                                 verified result



                                                                 was Culture In



                                                                 Progress on



                                                                 2022 at 190

1



                                                                 CDTPrevious



                                                                 preliminary



                                                                 verified result



                                                                 was No growth a

t



                                                                 24 hours on



                                                                 2022 at 160

1



                                                                 CDTPrevious



                                                                 preliminary



                                                                 verified result



                                                                 was No growth a

t



                                                                 48 hours on



                                                                 2022 at 160

1



                                                                 CDTPrevious



                                                                 preliminary



                                                                 verified result



                                                                 was No growth a

t



                                                                 72 hours on



                                                                 2022 at 160

1



                                                                 CDT

 

             Blood        No organisms No growth                 Previous



             Culture-Anaerobic isolated                               preliminar

y



             (test code = 84407-1)                                        verifi

ed result



                                                                 was Culture In



                                                                 Progress on



                                                                 2022 at 190

1



                                                                 CDTPrevious



                                                                 preliminary



                                                                 verified result



                                                                 was No growth a

t



                                                                 24 hours on



                                                                 2022 at 160

1



                                                                 CDTPrevious



                                                                 preliminary



                                                                 verified result



                                                                 was No growth a

t



                                                                 48 hours on



                                                                 2022 at 160

1



                                                                 CDTPrevious



                                                                 preliminary



                                                                 verified result



                                                                 was No growth a

t



                                                                 72 hours on



                                                                 2022 at 160

1



                                                                 CDT

 

             Lab Interpretation Normal                                 



             (test code = 80852-1)                                        



[facility] Culture - Peripheral [id number] 
21:01:35





             Test Item    Value        Reference Range Interpretation Comments

 

             Blood Culture-Aerobic No organisms No growth                 Previo

us



             (test code = 17928-3) isolated                               prelim

inary



                                                                 verified result



                                                                 was Culture In



                                                                 Progress on



                                                                 2022 at 190

1



                                                                 CDTPrevious



                                                                 preliminary



                                                                 verified result



                                                                 was No growth a

t



                                                                 24 hours on



                                                                 2022 at 160

1



                                                                 CDTPrevious



                                                                 preliminary



                                                                 verified result



                                                                 was No growth a

t



                                                                 48 hours on



                                                                 2022 at 160

1



                                                                 CDTPrevious



                                                                 preliminary



                                                                 verified result



                                                                 was No growth a

t



                                                                 72 hours on



                                                                 2022 at 160

1



                                                                 CDT

 

             Blood        No organisms No growth                 Previous



             Culture-Anaerobic isolated                               preliminar

y



             (test code = 33254-1)                                        verifi

ed result



                                                                 was Culture In



                                                                 Progress on



                                                                 2022 at 190

1



                                                                 CDTPrevious



                                                                 preliminary



                                                                 verified result



                                                                 was No growth a

t



                                                                 24 hours on



                                                                 2022 at 160

1



                                                                 CDTPrevious



                                                                 preliminary



                                                                 verified result



                                                                 was No growth a

t



                                                                 48 hours on



                                                                 2022 at 160

1



                                                                 CDTPrevious



                                                                 preliminary



                                                                 verified result



                                                                 was No growth a

t



                                                                 72 hours on



                                                                 2022 at 160

1



                                                                 CDT

 

             Lab Interpretation Normal                                 



             (test code = 55778-1)                                        



[facility] Culture - Peripheral Vein #  
21:01:35





             Test Item    Value        Reference Range Interpretation Comments

 

             Blood Culture-Aerobic No organisms No growth                 Previo

us



             (test code = 17928-3) isolated                               prelim

inary



                                                                 verified result



                                                                 was Culture In



                                                                 Progress on



                                                                 2022 at 190

1



                                                                 CDTPrevious



                                                                 preliminary



                                                                 verified result



                                                                 was No growth a

t



                                                                 24 hours on



                                                                 2022 at 160

1



                                                                 CDTPrevious



                                                                 preliminary



                                                                 verified result



                                                                 was No growth a

t



                                                                 48 hours on



                                                                 2022 at 160

1



                                                                 CDTPrevious



                                                                 preliminary



                                                                 verified result



                                                                 was No growth a

t



                                                                 72 hours on



                                                                 2022 at 160

1



                                                                 CDT

 

             Blood        No organisms No growth                 Previous



             Culture-Anaerobic isolated                               preliminar

y



             (test code = 41675-0)                                        verifi

ed result



                                                                 was Culture In



                                                                 Progress on



                                                                 2022 at 190

1



                                                                 CDTPrevious



                                                                 preliminary



                                                                 verified result



                                                                 was No growth a

t



                                                                 24 hours on



                                                                 2022 at 160

1



                                                                 CDTPrevious



                                                                 preliminary



                                                                 verified result



                                                                 was No growth a

t



                                                                 48 hours on



                                                                 2022 at 160

1



                                                                 CDTPrevious



                                                                 preliminary



                                                                 verified result



                                                                 was No growth a

t



                                                                 72 hours on



                                                                 2022 at 160

1



                                                                 CDT

 

             Lab Interpretation Normal                                 



             (test code = 82028-5)                                        



[facility] Culture - Peripheral [id number] 
21:01:35





             Test Item    Value        Reference Range Interpretation Comments

 

             Blood Culture-Aerobic No organisms No growth                 Previo

us



             (test code = 17928-3) isolated                               prelim

inary



                                                                 verified result



                                                                 was Culture In



                                                                 Progress on



                                                                 2022 at 190

1



                                                                 CDTPrevious



                                                                 preliminary



                                                                 verified result



                                                                 was No growth a

t



                                                                 24 hours on



                                                                 2022 at 160

1



                                                                 CDTPrevious



                                                                 preliminary



                                                                 verified result



                                                                 was No growth a

t



                                                                 48 hours on



                                                                 2022 at 160

1



                                                                 CDTPrevious



                                                                 preliminary



                                                                 verified result



                                                                 was No growth a

t



                                                                 72 hours on



                                                                 2022 at 160

1



                                                                 CDT

 

             Blood        No organisms No growth                 Previous



             Culture-Anaerobic isolated                               preliminar

y



             (test code = 81201-8)                                        verifi

ed result



                                                                 was Culture In



                                                                 Progress on



                                                                 2022 at 190

1



                                                                 CDTPrevious



                                                                 preliminary



                                                                 verified result



                                                                 was No growth a

t



                                                                 24 hours on



                                                                 2022 at 160

1



                                                                 CDTPrevious



                                                                 preliminary



                                                                 verified result



                                                                 was No growth a

t



                                                                 48 hours on



                                                                 2022 at 160

1



                                                                 CDTPrevious



                                                                 preliminary



                                                                 verified result



                                                                 was No growth a

t



                                                                 72 hours on



                                                                 2022 at 160

1



                                                                 CDT

 

             Lab Interpretation Normal                                 



             (test code = 24706-0)                                        



[facility] acute hospital GLUCOSE (AUTOMATED)2022 14:13:49





             Test Item    Value        Reference Range Interpretation Comments

 

             POCT GLU (test code = 6056522115) 176 mg/dL           H      

      

 

             Lab Interpretation (test code = Abnormal                           

    



             36152-3)                                            



[facility] acute hospital GLUCOSE (AUTOMATED)2022 14:13:49





             Test Item    Value        Reference Range Interpretation Comments

 

             POCT GLU (test code = 2216744026) 176 mg/dL           H      

      

 

             Lab Interpretation (test code = Abnormal                           

    



             11917-5)                                            



General acute hospital GLUCOSE (AUTOMATED)2022 02:30:54





             Test Item    Value        Reference Range Interpretation Comments

 

             POCT GLU (test code = 9888896273) 113 mg/dL           H      

      

 

             Lab Interpretation (test code = Abnormal                           

    



             74156-9)                                            



General acute hospital GLUCOSE (AUTOMATED)2022 02:30:54





             Test Item    Value        Reference Range Interpretation Comments

 

             POCT GLU (test code = 3101002879) 113 mg/dL           H      

      

 

             Lab Interpretation (test code = Abnormal                           

    



             42446-0)                                            



General acute hospital GLUCOSE (AUTOMATED)2022 23:41:12





             Test Item    Value        Reference Range Interpretation Comments

 

             POCT GLU (test code = 2389538302) 135 mg/dL           H      

      

 

             Lab Interpretation (test code = Abnormal                           

    



             42793-8)                                            



General acute hospital GLUCOSE (AUTOMATED)2022 23:41:12





             Test Item    Value        Reference Range Interpretation Comments

 

             POCT GLU (test code = 5702780971) 135 mg/dL           H      

      

 

             Lab Interpretation (test code = Abnormal                           

    



             81419-4)                                            



General acute hospital GLUCOSE (AUTOMATED)2022 17:13:18





             Test Item    Value        Reference Range Interpretation Comments

 

             POCT GLU (test code = 1667527753) 238 mg/dL           H      

      

 

             Lab Interpretation (test code = Abnormal                           

    



             16977-9)                                            



General acute hospital GLUCOSE (AUTOMATED)2022 17:13:18





             Test Item    Value        Reference Range Interpretation Comments

 

             POCT GLU (test code = 3806084192) 238 mg/dL           H      

      

 

             Lab Interpretation (test code = Abnormal                           

    



             38516-9)                                            



General acute hospital GLUCOSE (AUTOMATED)2022 17:13:18





             Test Item    Value        Reference Range Interpretation Comments

 

             POCT GLU (test code = 9129901379) 238 mg/dL           H      

      

 

             Lab Interpretation (test code = Abnormal                           

    



             36795-5)                                            



[facility]-REACTIVE [id number] 16:50:53





             Test Item    Value        Reference Range Interpretation Comments

 

             CRP (test code = 0.9 mg/dL    See_Comment  H             [Automated

 message]



             [id number])                                         The system [employer]



                                                                 generated this



                                                                 result transmit

huma



                                                                 reference range

:



                                                                 <=0.8. The refe

rence



                                                                 range was not u

sed



                                                                 to interpret th

is



                                                                 result as



                                                                 normal/abnormal

.

 

             Lab Interpretation (test Abnormal                               



             code = 62271-0)                                        



[facility]-REACTIVE [id number] 16:50:53





             Test Item    Value        Reference Range Interpretation Comments

 

             CRP (test code = 0.9 mg/dL    See_Comment  H             [Automated

 message]



             2239189496)                                         The system [employer]



                                                                 generated this



                                                                 result transmit

huma



                                                                 reference range

:



                                                                 <=0.8. The refe

rence



                                                                 range was not u

sed



                                                                 to interpret th

is



                                                                 result as



                                                                 normal/abnormal

.

 

             Lab Interpretation (test Abnormal                               



             code = 92941-5)                                        



[facility]-REACTIVE [id number] 16:50:53





             Test Item    Value        Reference Range Interpretation Comments

 

             CRP (test code = 0.9 mg/dL    See_Comment  H             [Automated

 message]



             2675173361)                                         The system [employer]



                                                                 generated this



                                                                 result transmit

huma



                                                                 reference range

:



                                                                 <=0.8. The refe

rence



                                                                 range was not u

sed



                                                                 to interpret th

is



                                                                 result as



                                                                 normal/abnormal

.

 

             Lab Interpretation (test Abnormal                               



             code = 39249-5)                                        



General acute hospital GLUCOSE (AUTOMATED)2022 15:55:49





             Test Item    Value        Reference Range Interpretation Comments

 

             POCT GLU (test code = 5451061594) 209 mg/dL           H      

      

 

             Lab Interpretation (test code = Abnormal                           

    



             45893-3)                                            



General acute hospital GLUCOSE (AUTOMATED)2022 15:55:49





             Test Item    Value        Reference Range Interpretation Comments

 

             POCT GLU (test code = 9475944991) 209 mg/dL           H      

      

 

             Lab Interpretation (test code = Abnormal                           

    



             34098-4)                                            



General acute hospital GLUCOSE (AUTOMATED)2022 00:58:44





             Test Item    Value        Reference Range Interpretation Comments

 

             POCT GLU (test code = 5803385268) 300 mg/dL           H      

      

 

             Lab Interpretation (test code = Abnormal                           

    



             25029-3)                                            



General acute hospital GLUCOSE (AUTOMATED)2022 00:58:44





             Test Item    Value        Reference Range Interpretation Comments

 

             POCT GLU (test code = 7249293148) 300 mg/dL           H      

      

 

             Lab Interpretation (test code = Abnormal                           

    



             12029-9)                                            



General acute hospital GLUCOSE (AUTOMATED)2022 21:28:03





             Test Item    Value        Reference Range Interpretation Comments

 

             POCT GLU (test code = 5617253966) 119 mg/dL           H      

      

 

             Lab Interpretation (test code = Abnormal                           

    



             52695-9)                                            



General acute hospital GLUCOSE (AUTOMATED)2022 21:28:03





             Test Item    Value        Reference Range Interpretation Comments

 

             POCT GLU (test code = 2617228343) 119 mg/dL           H      

      

 

             Lab Interpretation (test code = Abnormal                           

    



             76893-9)                                            



General acute hospital GLUCOSE (AUTOMATED)2022 16:51:05





             Test Item    Value        Reference Range Interpretation Comments

 

             POCT GLU (test code = 6486271479) 275 mg/dL           H      

      

 

             Lab Interpretation (test code = Abnormal                           

    



             47726-4)                                            



General acute hospital GLUCOSE (AUTOMATED)2022 16:51:05





             Test Item    Value        Reference Range Interpretation Comments

 

             POCT GLU (test code = 7818956262) 275 mg/dL           H      

      

 

             Lab Interpretation (test code = Abnormal                           

    



             77783-3)                                            



[facility] METABOLIC PANEL (NA, K, CL, CO2, 
GLUCOSE, BUN, CREATININE, CA)2022 15:51:39





             Test Item    Value        Reference Range Interpretation Comments

 

             NA (test code = 136 mmol/L   135-145                   



             3489697245)                                         

 

             K (test code = 3.7 mmol/L   3.5-5                     



             1150850890)                                         

 

             CL (test code = 111 mmol/L          H            



             7087836633)                                         

 

             CO2 TOTAL (test code = 21 mmol/L    23-31        L            



             1233127013)                                         

 

             AGAP (test code =              2-16                      



             1413274972)                                         

 

             BUN (test code = 9 mg/dL      7-23                      



             3579561763)                                         

 

             GLUCOSE (test code = 278 mg/dL           H            



             5614860408)                                         

 

             CREATININE (test code = 0.46 mg/dL   0.6-1.25     L            



             3829071616)                                         

 

             CALCIUM (test code = 8.2 mg/dL    8.6-10.6     L            



             6253407628)                                         

 

             eGFR (test code =              mL/min/1.73m2              



             2328561357)                                         

 

             MAL (test code = MAL) Association of                           



                          Glomerular Filtration                           



                          Rate (GFR) and Staging                           



                          of Kidney Disease*                           



                          +---------------------                           



                          --+-------------------                           



                          --+-------------------                           



                          ------+| GFR                           



                          (mL/min/1.73 m2) ?|                           



                          With Kidney Damage ?|                           



                          ?Without Kidney                           



                          Damage+---------------                           



                          --------+-------------                           



                          --------+-------------                           



                          ------------+| ?>90 ?                           



                          ? ? ? ? ? ? ? ?|                           



                          ?Stage one ? ? ? ? ?|                           



                          ? Normal ? ? ? ? ? ? ?                           



                          ?+--------------------                           



                          ---+------------------                           



                          ---+------------------                           



                          -------+| ?60-89 ? ? ?                           



                          ? ? ? ? ?| ?Stage two                           



                          ? ? ? ? ?| ? Decreased                           



                          GFR ? ? ? ?                            



                          +---------------------                           



                          --+-------------------                           



                          --+-------------------                           



                          ------+| ?30-59 ? ? ?                           



                          ? ? ? ? ?| ?Stage                           



                          three ? ? ? ?| ? Stage                           



                          three ? ? ? ? ?                           



                          +---------------------                           



                          --+-------------------                           



                          --+-------------------                           



                          ------+| ?15-29 ? ? ?                           



                          ? ? ? ? ?| ?Stage four                           



                          ? ? ? ? | ? Stage four                           



                          ? ? ? ? ?                              



                          ?+--------------------                           



                          ---+------------------                           



                          ---+------------------                           



                          -------+| ?<15 (or                           



                          dialysis) ? ?| ?Stage                           



                          five ? ? ? ? | ? Stage                           



                          five ? ? ? ? ?                           



                          ?+--------------------                           



                          ---+------------------                           



                          ---+------------------                           



                          -------+ *Each stage                           



                          assumes the associated                           



                          GFR level has been in                           



                          effect for at least                           



                          three months. ?Stages                           



                          1 to 5, with or                           



                          without kidney                           



                          disease, indicate                           



                          chronic kidney                           



                          disease. Notes:                           



                          Determination of                           



                          stages one and two                           



                          (with eGFR                             



                          >59mL/min/1.73 m2)                           



                          requires estimation of                           



                          kidney damage for at                           



                          least three months as                           



                          defined by structural                           



                          or functional                           



                          abnormalities of the                           



                          kidney, manifested by                           



                          either:Pathological                           



                          abnormalities or                           



                          Markers of kidney                           



                          damage (including                           



                          abnormalities in the                           



                          composition of the                           



                          blood or urine or                           



                          abnormalities in                           



                          imaging tests).                           

 

             Lab Interpretation Abnormal                               



             (test code = 48268-6)                                        



[facility] METABOLIC PANEL (NA, K, CL, CO2, 
GLUCOSE, BUN, CREATININE, CA)2022 15:51:39





             Test Item    Value        Reference Range Interpretation Comments

 

             NA (test code = 136 mmol/L   135-145                   



             4446956544)                                         

 

             K (test code = 3.7 mmol/L   3.5-5                     



             6805867412)                                         

 

             CL (test code = 111 mmol/L          H            



             4699300931)                                         

 

             CO2 TOTAL (test code = 21 mmol/L    23-31        L            



             0085310095)                                         

 

             AGAP (test code =              2-16                      



             9437708314)                                         

 

             BUN (test code = 9 mg/dL      7-23                      



             5198328403)                                         

 

             GLUCOSE (test code = 278 mg/dL           H            



             8643127687)                                         

 

             CREATININE (test code = 0.46 mg/dL   0.6-1.25     L            



             1523686472)                                         

 

             CALCIUM (test code = 8.2 mg/dL    8.6-10.6     L            



             4686019305)                                         

 

             eGFR (test code =              mL/min/1.73m2              



             1976576647)                                         

 

             MAL (test code = MAL) Association of                           



                          Glomerular Filtration                           



                          Rate (GFR) and Staging                           



                          of Kidney Disease*                           



                          +---------------------                           



                          --+-------------------                           



                          --+-------------------                           



                          ------+| GFR                           



                          (mL/min/1.73 m2) ?|                           



                          With Kidney Damage ?|                           



                          ?Without Kidney                           



                          Damage+---------------                           



                          --------+-------------                           



                          --------+-------------                           



                          ------------+| ?>90 ?                           



                          ? ? ? ? ? ? ? ?|                           



                          ?Stage one ? ? ? ? ?|                           



                          ? Normal ? ? ? ? ? ? ?                           



                          ?+--------------------                           



                          ---+------------------                           



                          ---+------------------                           



                          -------+| ?60-89 ? ? ?                           



                          ? ? ? ? ?| ?Stage two                           



                          ? ? ? ? ?| ? Decreased                           



                          GFR ? ? ? ?                            



                          +---------------------                           



                          --+-------------------                           



                          --+-------------------                           



                          ------+| ?30-59 ? ? ?                           



                          ? ? ? ? ?| ?Stage                           



                          three ? ? ? ?| ? Stage                           



                          three ? ? ? ? ?                           



                          +---------------------                           



                          --+-------------------                           



                          --+-------------------                           



                          ------+| ?15-29 ? ? ?                           



                          ? ? ? ? ?| ?Stage four                           



                          ? ? ? ? | ? Stage four                           



                          ? ? ? ? ?                              



                          ?+--------------------                           



                          ---+------------------                           



                          ---+------------------                           



                          -------+| ?<15 (or                           



                          dialysis) ? ?| ?Stage                           



                          five ? ? ? ? | ? Stage                           



                          five ? ? ? ? ?                           



                          ?+--------------------                           



                          ---+------------------                           



                          ---+------------------                           



                          -------+ *Each stage                           



                          assumes the associated                           



                          GFR level has been in                           



                          effect for at least                           



                          three months. ?Stages                           



                          1 to 5, with or                           



                          without kidney                           



                          disease, indicate                           



                          chronic kidney                           



                          disease. Notes:                           



                          Determination of                           



                          stages one and two                           



                          (with eGFR                             



                          >59mL/min/1.73 m2)                           



                          requires estimation of                           



                          kidney damage for at                           



                          least three months as                           



                          defined by structural                           



                          or functional                           



                          abnormalities of the                           



                          kidney, manifested by                           



                          either:Pathological                           



                          abnormalities or                           



                          Markers of kidney                           



                          damage (including                           



                          abnormalities in the                           



                          composition of the                           



                          blood or urine or                           



                          abnormalities in                           



                          imaging tests).                           

 

             Lab Interpretation Abnormal                               



             (test code = 44809-0)                                        



[facility] (AUTOMATED)2022 12:36:29





             Test Item    Value        Reference Range Interpretation Comments

 

             POCT GLU (test code = 5711998007) 276 mg/dL           H      

      

 

             Lab Interpretation (test code = Abnormal                           

    



             86383-3)                                            



[facility] acute hospital GLUCOSE (AUTOMATED)2022 12:36:29





             Test Item    Value        Reference Range Interpretation Comments

 

             POCT GLU (test code = 7649995812) 276 mg/dL           H      

      

 

             Lab Interpretation (test code = Abnormal                           

    



             32802-0)                                            



[facility] acute hospital GLUCOSE (AUTOMATED)2022 01:29:51





             Test Item    Value        Reference Range Interpretation Comments

 

             POCT GLU (test code = 7732346964) 289 mg/dL           H      

      

 

             Lab Interpretation (test code = Abnormal                           

    



             29602-9)                                            



[facility] acute hospital GLUCOSE (AUTOMATED)2022 01:29:51





             Test Item    Value        Reference Range Interpretation Comments

 

             POCT GLU (test code = 5119962652) 289 mg/dL           H      

      

 

             Lab Interpretation (test code = Abnormal                           

    



             19717-8)                                            



[facility] acute hospital GLUCOSE (AUTOMATED)2022 21:24:38





             Test Item    Value        Reference Range Interpretation Comments

 

             POCT GLU (test code = 1776146912) 173 mg/dL           H      

      

 

             Lab Interpretation (test code = Abnormal                           

    



             67436-8)                                            



[facility] acute hospital GLUCOSE (AUTOMATED)2022 21:24:38





             Test Item    Value        Reference Range Interpretation Comments

 

             POCT GLU (test code = 3100147368) 173 mg/dL           H      

      

 

             Lab Interpretation (test code = Abnormal                           

    



             98031-6)                                            



[facility] hospital GLUCOSE (AUTOMATED)2022 16:50:49





             Test Item    Value        Reference Range Interpretation Comments

 

             POCT GLU (test code = 8593173549) 279 mg/dL           H      

      

 

             Lab Interpretation (test code = Abnormal                           

    



             39859-8)                                            



[facility]





             Test Item    Value        Reference Range Interpretation Comments

 

             POCT GLU (test code = 5554057027) 279 mg/dL           H      

      

 

             Lab Interpretation (test code = Abnormal                           

    



             87670-7)                                            



[facility] acute hospital GLUCOSE (AUTOMATED)2022 13:31:23





             Test Item    Value        Reference Range Interpretation Comments

 

             POCT GLU (test code = 4094405280) 281 mg/dL           H      

      

 

             Lab Interpretation (test code = Abnormal                           

    



             35588-3)                                            



[facility] acute hospital GLUCOSE (AUTOMATED)2022 13:31:23





             Test Item    Value        Reference Range Interpretation Comments

 

             POCT GLU (test code = 4747386135) 281 mg/dL           H      

      

 

             Lab Interpretation (test code = Abnormal                           

    



             12703-0)                                            



[facility] acute hospital GLUCOSE (AUTOMATED)2022 10:02:35





             Test Item    Value        Reference Range Interpretation Comments

 

             POCT GLU (test code = 3893267127) 339 mg/dL           H      

      

 

             Lab Interpretation (test code = Abnormal                           

    



             21388-5)                                            



[facility]





             Test Item    Value        Reference Range Interpretation Comments

 

             POCT GLU (test code = 0552520211) 339 mg/dL           H      

      

 

             Lab Interpretation (test code = Abnormal                           

    



             60484-2)                                            



[facility] acute hospital GLUCOSE (AUTOMATED)2022 06:33:22





             Test Item    Value        Reference Range Interpretation Comments

 

             POCT GLU (test code = 7319446279) 295 mg/dL           H      

      

 

             Lab Interpretation (test code = Abnormal                           

    



             34781-2)                                            



[facility]





             Test Item    Value        Reference Range Interpretation Comments

 

             POCT GLU (test code = 3500511070) 295 mg/dL           H      

      

 

             Lab Interpretation (test code = Abnormal                           

    



             45753-9)                                            



[facility] 01:54:18





             Test Item    Value        Reference Range Interpretation Comments

 

             POCT GLU (test code = 7296394284) 258 mg/dL           H      

      

 

             Lab Interpretation (test code = Abnormal                           

    



             51263-3)                                            



[facility] acute hospital GLUCOSE (AUTOMATED)2022 01:54:18





             Test Item    Value        Reference Range Interpretation Comments

 

             POCT GLU (test code = 0789780296) 258 mg/dL           H      

      

 

             Lab Interpretation (test code = Abnormal                           

    



             47262-4)                                            



[facility] acute hospital GLUCOSE (AUTOMATED)2022 23:03:09





             Test Item    Value        Reference Range Interpretation Comments

 

             POCT GLU (test code = 8210234133) 286 mg/dL           H      

      

 

             Lab Interpretation (test code = Abnormal                           

    



             50460-8)                                            



[facility] acute hospital GLUCOSE (AUTOMATED)2022 23:03:09





             Test Item    Value        Reference Range Interpretation Comments

 

             POCT GLU (test code = 1897385932) 286 mg/dL           H      

      

 

             Lab Interpretation (test code = Abnormal                           

    



             74520-0)                                            



[facility] acute hospital GLUCOSE (AUTOMATED)2022 20:48:08





             Test Item    Value        Reference Range Interpretation Comments

 

             POCT GLU (test code = 2892706395) 249 mg/dL           H      

      

 

             Lab Interpretation (test code = Abnormal                           

    



             95992-8)                                            



[facility] acute hospital GLUCOSE (AUTOMATED)2022 20:48:08





             Test Item    Value        Reference Range Interpretation Comments

 

             POCT GLU (test code = 2693029990) 249 mg/dL           H      

      

 

             Lab Interpretation (test code = Abnormal                           

    



             12679-1)                                            



[id number] 17:01:47





             Test Item    Value        Reference Range Interpretation Comments

 

             BOH (test code = 1.0 mmol/L                             



             6759268926)                                         

 

             MAL (test code = Normal Ranges: ? ?                           



             MAL)         Nonfasting ? ? ? ? ? Less                           



                          than 0.1 mmol/L ? ?                           



                          Overnight Fast ? ? ? Less                           



                          than 0.4 mmol/L ? ? Fasting                           



                          (1-2 weeks) ?6-8 mmol/L Test                          

 



                          developed and                           



                          characteristics determined                           



                          by [facility].                          

 



[id number] 17:01:47





             Test Item    Value        Reference Range Interpretation Comments

 

             BOH (test code = 1.0 mmol/L                             



             0197809997)                                         

 

             MAL (test code = Normal Ranges: ? ?                           



             MAL)         Nonfasting ? ? ? ? ? Less                           



                          than 0.1 mmol/L ? ?                           



                          Overnight Fast ? ? ? Less                           



                          than 0.4 mmol/L ? ? Fasting                           



                          (1-2 weeks) ?6-8 mmol/L Test                          

 



                          developed and                           



                          characteristics determined                           



                          by [facility].                          

 



[id number] 17:01:47





             Test Item    Value        Reference Range Interpretation Comments

 

             BOH (test code = 1.0 mmol/L                             



             2080568374)                                         

 

             MAL (test code = Normal Ranges: ? ?                           



             MAL)         Nonfasting ? ? ? ? ? Less                           



                          than 0.1 mmol/L ? ?                           



                          Overnight Fast ? ? ? Less                           



                          than 0.4 mmol/L ? ? Fasting                           



                          (1-2 weeks) ?6-8 mmol/L Test                          

 



                          developed and                           



                          characteristics determined                           



                          by [facility].                          

 



General acute hospital GLUCOSE (AUTOMATED)2022 16:42:59





             Test Item    Value        Reference Range Interpretation Comments

 

             POCT GLU (test code = 9627932679) 264 mg/dL           H      

      

 

             Lab Interpretation (test code = Abnormal                           

    



             74384-6)                                            



General acute hospital GLUCOSE (AUTOMATED)2022 16:42:59





             Test Item    Value        Reference Range Interpretation Comments

 

             POCT GLU (test code = 8004145619) 264 mg/dL           H      

      

 

             Lab Interpretation (test code = Abnormal                           

    



             34065-3)                                            



[facility]Na, K, Cl, CO2, 
Glucose, BUN, Creatinine, Ca)2022 16:16:20





             Test Item    Value        Reference Range Interpretation Comments

 

             NA (test code = 137 mmol/L   135-145                   



             4989370756)                                         

 

             K (test code = 3.5 mmol/L   3.5-5                     



             0035009415)                                         

 

             CL (test code = 115 mmol/L          H            



             8326818506)                                         

 

             CO2 TOTAL (test code = 17 mmol/L    23-31        L            



             9522079714)                                         

 

             AGAP (test code =              2-16                      



             1904117872)                                         

 

             BUN (test code = 5 mg/dL      7-23         L            



             7878913827)                                         

 

             GLUCOSE (test code = 271 mg/dL           H            



             4146000232)                                         

 

             CREATININE (test code = 0.45 mg/dL   0.6-1.25     L            



             4570130884)                                         

 

             CALCIUM (test code = 8.5 mg/dL    8.6-10.6     L            



             1742390528)                                         

 

             eGFR (test code =              mL/min/1.73m2              



             1765622959)                                         

 

             MAL (test code = MAL) Association of                           



                          Glomerular Filtration                           



                          Rate (GFR) and Staging                           



                          of Kidney Disease*                           



                          +---------------------                           



                          --+-------------------                           



                          --+-------------------                           



                          ------+| GFR                           



                          (mL/min/1.73 m2) ?|                           



                          With Kidney Damage ?|                           



                          ?Without Kidney                           



                          Damage+---------------                           



                          --------+-------------                           



                          --------+-------------                           



                          ------------+| ?>90 ?                           



                          ? ? ? ? ? ? ? ?|                           



                          ?Stage one ? ? ? ? ?|                           



                          ? Normal ? ? ? ? ? ? ?                           



                          ?+--------------------                           



                          ---+------------------                           



                          ---+------------------                           



                          -------+| ?60-89 ? ? ?                           



                          ? ? ? ? ?| ?Stage two                           



                          ? ? ? ? ?| ? Decreased                           



                          GFR ? ? ? ?                            



                          +---------------------                           



                          --+-------------------                           



                          --+-------------------                           



                          ------+| ?30-59 ? ? ?                           



                          ? ? ? ? ?| ?Stage                           



                          three ? ? ? ?| ? Stage                           



                          three ? ? ? ? ?                           



                          +---------------------                           



                          --+-------------------                           



                          --+-------------------                           



                          ------+| ?15-29 ? ? ?                           



                          ? ? ? ? ?| ?Stage four                           



                          ? ? ? ? | ? Stage four                           



                          ? ? ? ? ?                              



                          ?+--------------------                           



                          ---+------------------                           



                          ---+------------------                           



                          -------+| ?<15 (or                           



                          dialysis) ? ?| ?Stage                           



                          five ? ? ? ? | ? Stage                           



                          five ? ? ? ? ?                           



                          ?+--------------------                           



                          ---+------------------                           



                          ---+------------------                           



                          -------+ *Each stage                           



                          assumes the associated                           



                          GFR level has been in                           



                          effect for at least                           



                          three months. ?Stages                           



                          1 to 5, with or                           



                          without kidney                           



                          disease, indicate                           



                          chronic kidney                           



                          disease. Notes:                           



                          Determination of                           



                          stages one and two                           



                          (with eGFR                             



                          >59mL/min/1.73 m2)                           



                          requires estimation of                           



                          kidney damage for at                           



                          least three months as                           



                          defined by structural                           



                          or functional                           



                          abnormalities of the                           



                          kidney, manifested by                           



                          either:Pathological                           



                          abnormalities or                           



                          Markers of kidney                           



                          damage (including                           



                          abnormalities in the                           



                          composition of the                           



                          blood or urine or                           



                          abnormalities in                           



                          imaging tests).                           

 

             Lab Interpretation Abnormal                               



             (test code = 61334-0)                                        



[facility] Metabolic Panel (Na, K, Cl, CO2, 
Glucose, BUN, Creatinine, Ca)2022 16:16:20





             Test Item    Value        Reference Range Interpretation Comments

 

             NA (test code = 137 mmol/L   135-145                   



             1136692246)                                         

 

             K (test code = 3.5 mmol/L   3.5-5                     



             5109165363)                                         

 

             CL (test code = 115 mmol/L          H            



             3658830082)                                         

 

             CO2 TOTAL (test code = 17 mmol/L    23-31        L            



             1800949703)                                         

 

             AGAP (test code =              2-16                      



             6970750818)                                         

 

             BUN (test code = 5 mg/dL      7-23         L            



             6934413819)                                         

 

             GLUCOSE (test code = 271 mg/dL           H            



             9397889865)                                         

 

             CREATININE (test code = 0.45 mg/dL   0.6-1.25     L            



             5540383271)                                         

 

             CALCIUM (test code = 8.5 mg/dL    8.6-10.6     L            



             0690616002)                                         

 

             eGFR (test code =              mL/min/1.73m2              



             4113439038)                                         

 

             MAL (test code = MAL) Association of                           



                          Glomerular Filtration                           



                          Rate (GFR) and Staging                           



                          of Kidney Disease*                           



                          +---------------------                           



                          --+-------------------                           



                          --+-------------------                           



                          ------+| GFR                           



                          (mL/min/1.73 m2) ?|                           



                          With Kidney Damage ?|                           



                          ?Without Kidney                           



                          Damage+---------------                           



                          --------+-------------                           



                          --------+-------------                           



                          ------------+| ?>90 ?                           



                          ? ? ? ? ? ? ? ?|                           



                          ?Stage one ? ? ? ? ?|                           



                          ? Normal ? ? ? ? ? ? ?                           



                          ?+--------------------                           



                          ---+------------------                           



                          ---+------------------                           



                          -------+| ?60-89 ? ? ?                           



                          ? ? ? ? ?| ?Stage two                           



                          ? ? ? ? ?| ? Decreased                           



                          GFR ? ? ? ?                            



                          +---------------------                           



                          --+-------------------                           



                          --+-------------------                           



                          ------+| ?30-59 ? ? ?                           



                          ? ? ? ? ?| ?Stage                           



                          three ? ? ? ?| ? Stage                           



                          three ? ? ? ? ?                           



                          +---------------------                           



                          --+-------------------                           



                          --+-------------------                           



                          ------+| ?15-29 ? ? ?                           



                          ? ? ? ? ?| ?Stage four                           



                          ? ? ? ? | ? Stage four                           



                          ? ? ? ? ?                              



                          ?+--------------------                           



                          ---+------------------                           



                          ---+------------------                           



                          -------+| ?<15 (or                           



                          dialysis) ? ?| ?Stage                           



                          five ? ? ? ? | ? Stage                           



                          five ? ? ? ? ?                           



                          ?+--------------------                           



                          ---+------------------                           



                          ---+------------------                           



                          -------+ *Each stage                           



                          assumes the associated                           



                          GFR level has been in                           



                          effect for at least                           



                          three months. ?Stages                           



                          1 to 5, with or                           



                          without kidney                           



                          disease, indicate                           



                          chronic kidney                           



                          disease. Notes:                           



                          Determination of                           



                          stages one and two                           



                          (with eGFR                             



                          >59mL/min/1.73 m2)                           



                          requires estimation of                           



                          kidney damage for at                           



                          least three months as                           



                          defined by structural                           



                          or functional                           



                          abnormalities of the                           



                          kidney, manifested by                           



                          either:Pathological                           



                          abnormalities or                           



                          Markers of kidney                           



                          damage (including                           



                          abnormalities in the                           



                          composition of the                           



                          blood or urine or                           



                          abnormalities in                           



                          imaging tests).                           

 

             Lab Interpretation Abnormal                               



             (test code = 99057-6)                                        



[facility] (AUTOMATED)2022 14:21:27





             Test Item    Value        Reference Range Interpretation Comments

 

             POCT GLU (test code = 3280366900) 286 mg/dL           H      

      

 

             Lab Interpretation (test code = Abnormal                           

    



             99205-6)                                            



[facility] hospital GLUCOSE (AUTOMATED)2022 14:21:27





             Test Item    Value        Reference Range Interpretation Comments

 

             POCT GLU (test code = 0277215705) 286 mg/dL           H      

      

 

             Lab Interpretation (test code = Abnormal                           

    



             53042-1)                                            



[facility] POSITIVE BLOOD PATHOGENS DNA 
[id number] 13:29:25





             Test Item    Value        Reference Range Interpretation Comments

 

             Coagulase Negative Positive     Negative, See A            



             Staphylococcus (test              Comment/Narrative              



             code = 43995-5)                                        

 

             MAL (test code = MAL)  Coagulase negative                          

 



                          Staphylococcus (CoNS)                           



                          detected by DNA probe.                           



                          ?CoNS often                            



                          contaminate blood                           



                          cultures from skin                           



                          colonization during                           



                          phlebotomy. ?Preferred                           



                          management is to                           



                          repeat blood cultures,                           



                          and monitor off                           



                          antibiotics.                           



                          ?Contamination is                           



                          suggested by culture                           



                          growth after 48 hours,                           



                          or growth in single                           



                          culture (i.e., one of                           



                          two sets). ?True                           



                          bacteremia is                           



                          suggested by the                           



                          fever, hypotension,                           



                          and leukocytosis that                           



                          are not explained by                           



                          an alternative                           



                          infection, or                           



                          indwelling foreign                           



                          devices that appear                           



                          infected (catheters,                           



                          lines, or prostheses).                           



                          Consider Infectious                           



                          Diseases consultation                           



                          if differentiation of                           



                          CoNS bacteremia from                           



                          contamination is                           



                          uncertain. If clinical                           



                          context suggests true                           



                          bacteremia, preferred                           



                          therapy is vancomycin.                           



                          Please contact the                           



                          Antimicrobial                           



                          Stewardship Program                           



                          with questions.Pager:                           



                          [phone] Testing                           



                          included eleven                           



                          identification and                           



                          three resistance                           



                          marker                                 



                          targets.______________                           



                          ______________________                           



                          ______________________                           



                          _____________                           

 

             Lab Interpretation Abnormal                               



             (test code = 07736-1)                                        



[facility] 
[id number] 13:29:25





             Test Item    Value        Reference Range Interpretation Comments

 

             Coagulase Negative Positive     Negative, See A            



             Staphylococcus (test              Comment/Narrative              



             code = 00152-9)                                        

 

             MAL (test code = MAL)  Coagulase negative                          

 



                          Staphylococcus (CoNS)                           



                          detected by DNA probe.                           



                          ?CoNS often                            



                          contaminate blood                           



                          cultures from skin                           



                          colonization during                           



                          phlebotomy. ?Preferred                           



                          management is to                           



                          repeat blood cultures,                           



                          and monitor off                           



                          antibiotics.                           



                          ?Contamination is                           



                          suggested by culture                           



                          growth after 48 hours,                           



                          or growth in single                           



                          culture (i.e., one of                           



                          two sets). ?True                           



                          bacteremia is                           



                          suggested by the                           



                          fever, hypotension,                           



                          and leukocytosis that                           



                          are not explained by                           



                          an alternative                           



                          infection, or                           



                          indwelling foreign                           



                          devices that appear                           



                          infected (catheters,                           



                          lines, or prostheses).                           



                          Consider Infectious                           



                          Diseases consultation                           



                          if differentiation of                           



                          CoNS bacteremia from                           



                          contamination is                           



                          uncertain. If clinical                           



                          context suggests true                           



                          bacteremia, preferred                           



                          therapy is vancomycin.                           



                          Please contact the                           



                          Antimicrobial                           



                          Stewardship Program                           



                          with questions.Pager:                           



                          [phone] Testing                           



                          included eleven                           



                          identification and                           



                          three resistance                           



                          marker                                 



                          targets.______________                           



                          ______________________                           



                          ______________________                           



                          _____________                           

 

             Lab Interpretation Abnormal                               



             (test code = 89205-5)                                        



[facility] 
[id number] 13:29:25





             Test Item    Value        Reference Range Interpretation Comments

 

             Coagulase Negative Positive     Negative, See A            



             Staphylococcus (test              Comment/Narrative              



             code = 31176-5)                                        

 

             MAL (test code = MAL)  Coagulase negative                          

 



                          Staphylococcus (CoNS)                           



                          detected by DNA probe.                           



                          ?CoNS often                            



                          contaminate blood                           



                          cultures from skin                           



                          colonization during                           



                          phlebotomy. ?Preferred                           



                          management is to                           



                          repeat blood cultures,                           



                          and monitor off                           



                          antibiotics.                           



                          ?Contamination is                           



                          suggested by culture                           



                          growth after 48 hours,                           



                          or growth in single                           



                          culture (i.e., one of                           



                          two sets). ?True                           



                          bacteremia is                           



                          suggested by the                           



                          fever, hypotension,                           



                          and leukocytosis that                           



                          are not explained by                           



                          an alternative                           



                          infection, or                           



                          indwelling foreign                           



                          devices that appear                           



                          infected (catheters,                           



                          lines, or prostheses).                           



                          Consider Infectious                           



                          Diseases consultation                           



                          if differentiation of                           



                          CoNS bacteremia from                           



                          contamination is                           



                          uncertain. If clinical                           



                          context suggests true                           



                          bacteremia, preferred                           



                          therapy is vancomycin.                           



                          Please contact the                           



                          Antimicrobial                           



                          Stewardship Program                           



                          with questions.Pager:                           



                          [phone] Testing                           



                          included eleven                           



                          identification and                           



                          three resistance                           



                          marker                                 



                          targets.______________                           



                          ______________________                           



                          ______________________                           



                          _____________                           

 

             Lab Interpretation Abnormal                               



             (test code = 25843-2)                                        



[facility] hospital GLUCOSE (AUTOMATED)2022 13:03:22





             Test Item    Value        Reference Range Interpretation Comments

 

             POCT GLU (test code = 7629030406) 307 mg/dL           H      

      

 

             Lab Interpretation (test code = Abnormal                           

    



             68718-5)                                            



[facility] acute hospital GLUCOSE (AUTOMATED)2022 13:03:22





             Test Item    Value        Reference Range Interpretation Comments

 

             POCT GLU (test code = 6180038474) 307 mg/dL           H      

      

 

             Lab Interpretation (test code = Abnormal                           

    



             25742-7)                                            



[facility] hospital GLUCOSE (AUTOMATED)2022 09:05:19





             Test Item    Value        Reference Range Interpretation Comments

 

             POCT GLU (test code = 6373883976) 326 mg/dL           H      

      

 

             Lab Interpretation (test code = Abnormal                           

    



             28719-2)                                            



[facility] GLUCOSE (AUTOMATED)2022 09:05:19





             Test Item    Value        Reference Range Interpretation Comments

 

             POCT GLU (test code = 0145358175) 326 mg/dL           H      

      

 

             Lab Interpretation (test code = Abnormal                           

    



             72776-5)                                            



[facility] hospital GLUCOSE (AUTOMATED)2022 05:46:58





             Test Item    Value        Reference Range Interpretation Comments

 

             POCT GLU (test code = 2710669888) 341 mg/dL           H      

      

 

             Lab Interpretation (test code = Abnormal                           

    



             70379-7)                                            



[facility] acute hospital GLUCOSE (AUTOMATED)2022 05:46:58





             Test Item    Value        Reference Range Interpretation Comments

 

             POCT GLU (test code = 6214623704) 341 mg/dL           H      

      

 

             Lab Interpretation (test code = Abnormal                           

    



             34257-8)                                            



[facility] acute hospital GLUCOSE (AUTOMATED)2022 01:37:20





             Test Item    Value        Reference Range Interpretation Comments

 

             POCT GLU (test code = 2442217969) 196 mg/dL           H      

      

 

             Lab Interpretation (test code = Abnormal                           

    



             10205-1)                                            



[facility] acute hospital GLUCOSE (AUTOMATED)2022 01:37:20





             Test Item    Value        Reference Range Interpretation Comments

 

             POCT GLU (test code = 5101472572) 196 mg/dL           H      

      

 

             Lab Interpretation (test code = Abnormal                           

    



             86731-8)                                            



[facility] acute hospital GLUCOSE (AUTOMATED)2022 23:44:41





             Test Item    Value        Reference Range Interpretation Comments

 

             POCT GLU (test code = 2493129846) 138 mg/dL           H      

      

 

             Lab Interpretation (test code = Abnormal                           

    



             68506-7)                                            



[facility] acute hospital GLUCOSE (AUTOMATED)2022 23:44:41





             Test Item    Value        Reference Range Interpretation Comments

 

             POCT GLU (test code = 8687665118) 138 mg/dL           H      

      

 

             Lab Interpretation (test code = Abnormal                           

    



             12415-9)                                            



[facility] Metabolic Panel (Na, K, Cl, CO2, 
Glucose, BUN, Creatinine, Ca)2022 23:12:45





             Test Item    Value        Reference Range Interpretation Comments

 

             NA (test code = 138 mmol/L   135-145                   



             5029015116)                                         

 

             K (test code = 3.7 mmol/L   3.5-5                     



             8733091813)                                         

 

             CL (test code = 115 mmol/L          H            



             3031192069)                                         

 

             CO2 TOTAL (test code = 17 mmol/L    23-31        L            



             1130007648)                                         

 

             AGAP (test code =              2-16                      



             3672144683)                                         

 

             BUN (test code = 5 mg/dL      7-23         L            



             5983528172)                                         

 

             GLUCOSE (test code = 86 mg/dL                         



             4721997642)                                         

 

             CREATININE (test code = 0.46 mg/dL   0.6-1.25     L            



             3407964539)                                         

 

             CALCIUM (test code = 8.4 mg/dL    8.6-10.6     L            



             0994452175)                                         

 

             eGFR (test code =              mL/min/1.73m2              



             5116075018)                                         

 

             MAL (test code = MAL) Association of                           



                          Glomerular Filtration                           



                          Rate (GFR) and Staging                           



                          of Kidney Disease*                           



                          +---------------------                           



                          --+-------------------                           



                          --+-------------------                           



                          ------+| GFR                           



                          (mL/min/1.73 m2) ?|                           



                          With Kidney Damage ?|                           



                          ?Without Kidney                           



                          Damage+---------------                           



                          --------+-------------                           



                          --------+-------------                           



                          ------------+| ?>90 ?                           



                          ? ? ? ? ? ? ? ?|                           



                          ?Stage one ? ? ? ? ?|                           



                          ? Normal ? ? ? ? ? ? ?                           



                          ?+--------------------                           



                          ---+------------------                           



                          ---+------------------                           



                          -------+| ?60-89 ? ? ?                           



                          ? ? ? ? ?| ?Stage two                           



                          ? ? ? ? ?| ? Decreased                           



                          GFR ? ? ? ?                            



                          +---------------------                           



                          --+-------------------                           



                          --+-------------------                           



                          ------+| ?30-59 ? ? ?                           



                          ? ? ? ? ?| ?Stage                           



                          three ? ? ? ?| ? Stage                           



                          three ? ? ? ? ?                           



                          +---------------------                           



                          --+-------------------                           



                          --+-------------------                           



                          ------+| ?15-29 ? ? ?                           



                          ? ? ? ? ?| ?Stage four                           



                          ? ? ? ? | ? Stage four                           



                          ? ? ? ? ?                              



                          ?+--------------------                           



                          ---+------------------                           



                          ---+------------------                           



                          -------+| ?<15 (or                           



                          dialysis) ? ?| ?Stage                           



                          five ? ? ? ? | ? Stage                           



                          five ? ? ? ? ?                           



                          ?+--------------------                           



                          ---+------------------                           



                          ---+------------------                           



                          -------+ *Each stage                           



                          assumes the associated                           



                          GFR level has been in                           



                          effect for at least                           



                          three months. ?Stages                           



                          1 to 5, with or                           



                          without kidney                           



                          disease, indicate                           



                          chronic kidney                           



                          disease. Notes:                           



                          Determination of                           



                          stages one and two                           



                          (with eGFR                             



                          >59mL/min/1.73 m2)                           



                          requires estimation of                           



                          kidney damage for at                           



                          least three months as                           



                          defined by structural                           



                          or functional                           



                          abnormalities of the                           



                          kidney, manifested by                           



                          either:Pathological                           



                          abnormalities or                           



                          Markers of kidney                           



                          damage (including                           



                          abnormalities in the                           



                          composition of the                           



                          blood or urine or                           



                          abnormalities in                           



                          imaging tests).                           

 

             Lab Interpretation Abnormal                               



             (test code = 56245-9)                                        



[facility] Metabolic Panel (Na, K, Cl, CO2, 
Glucose, BUN, Creatinine, Ca)2022 23:12:45





             Test Item    Value        Reference Range Interpretation Comments

 

             NA (test code = 138 mmol/L   135-145                   



             [id number])                                         

 

             K (test code = 3.7 mmol/L   3.5-5                     



             5720475395)                                         

 

             CL (test code = 115 mmol/L          H            



             3886967722)                                         

 

             CO2 TOTAL (test code = 17 mmol/L    23-31        L            



             2160410482)                                         

 

             AGAP (test code =              2-16                      



             0911100957)                                         

 

             BUN (test code = 5 mg/dL      7-23         L            



             6422984268)                                         

 

             GLUCOSE (test code = 86 mg/dL                         



             3739889583)                                         

 

             CREATININE (test code = 0.46 mg/dL   0.6-1.25     L            



             9925246887)                                         

 

             CALCIUM (test code = 8.4 mg/dL    8.6-10.6     L            



             9903924648)                                         

 

             eGFR (test code =              mL/min/1.73m2              



             8241728009)                                         

 

             MAL (test code = MAL) Association of                           



                          Glomerular Filtration                           



                          Rate (GFR) and Staging                           



                          of Kidney Disease*                           



                          +---------------------                           



                          --+-------------------                           



                          --+-------------------                           



                          ------+| GFR                           



                          (mL/min/1.73 m2) ?|                           



                          With Kidney Damage ?|                           



                          ?Without Kidney                           



                          Damage+---------------                           



                          --------+-------------                           



                          --------+-------------                           



                          ------------+| ?>90 ?                           



                          ? ? ? ? ? ? ? ?|                           



                          ?Stage one ? ? ? ? ?|                           



                          ? Normal ? ? ? ? ? ? ?                           



                          ?+--------------------                           



                          ---+------------------                           



                          ---+------------------                           



                          -------+| ?60-89 ? ? ?                           



                          ? ? ? ? ?| ?Stage two                           



                          ? ? ? ? ?| ? Decreased                           



                          GFR ? ? ? ?                            



                          +---------------------                           



                          --+-------------------                           



                          --+-------------------                           



                          ------+| ?30-59 ? ? ?                           



                          ? ? ? ? ?| ?Stage                           



                          three ? ? ? ?| ? Stage                           



                          three ? ? ? ? ?                           



                          +---------------------                           



                          --+-------------------                           



                          --+-------------------                           



                          ------+| ?15-29 ? ? ?                           



                          ? ? ? ? ?| ?Stage four                           



                          ? ? ? ? | ? Stage four                           



                          ? ? ? ? ?                              



                          ?+--------------------                           



                          ---+------------------                           



                          ---+------------------                           



                          -------+| ?<15 (or                           



                          dialysis) ? ?| ?Stage                           



                          five ? ? ? ? | ? Stage                           



                          five ? ? ? ? ?                           



                          ?+--------------------                           



                          ---+------------------                           



                          ---+------------------                           



                          -------+ *Each stage                           



                          assumes the associated                           



                          GFR level has been in                           



                          effect for at least                           



                          three months. ?Stages                           



                          1 to 5, with or                           



                          without kidney                           



                          disease, indicate                           



                          chronic kidney                           



                          disease. Notes:                           



                          Determination of                           



                          stages one and two                           



                          (with eGFR                             



                          >59mL/min/1.73 m2)                           



                          requires estimation of                           



                          kidney damage for at                           



                          least three months as                           



                          defined by structural                           



                          or functional                           



                          abnormalities of the                           



                          kidney, manifested by                           



                          either:Pathological                           



                          abnormalities or                           



                          Markers of kidney                           



                          damage (including                           



                          abnormalities in the                           



                          composition of the                           



                          blood or urine or                           



                          abnormalities in                           



                          imaging tests).                           

 

             Lab Interpretation Abnormal                               



             (test code = 26736-4)                                        



[facility])2022 22:38:52





             Test Item    Value        Reference Range Interpretation Comments

 

             POCT GLU (test code = 3952815733) 94 mg/dL                   

      

 

             Lab Interpretation (test code = Normal                             

    



             63764-2)                                            



[facility] GLU[facility]SE (AUTOMATED)2022 22:38:52





             Test Item    Value        Reference Range Interpretation Comments

 

             POCT GLU (test code = 4130342742) 94 mg/dL                   

      

 

             Lab Interpretation (test code = Normal                             

    



             67931-0)                                            



[facility] GLUCOSE (AUTOMATED)2022 21:31:35





             Test Item    Value        Reference Range Interpretation Comments

 

             POCT GLU (test code = 7765923314) 119 mg/dL           H      

      

 

             Lab Interpretation (test code = Abnormal                           

    



             91918-5)                                            



[facility] GLUCOSE (AUTOMATED)2022 21:31:35





             Test Item    Value        Reference Range Interpretation Comments

 

             POCT GLU (test code = 7914722407) 119 mg/dL           H      

      

 

             Lab Interpretation (test code = Abnormal                           

    



             95482-2)                                            



[facility] GLUCOSE (AUTOMATED)2022 20:06:03





             Test Item    Value        Reference Range Interpretation Comments

 

             POCT GLU (test code = 8723979687) 177 mg/dL           H      

      

 

             Lab Interpretation (test code = Abnormal                           

    



             18307-2)                                            



[facility] GLUCOSE (AUTOMATED)2022 20:06:03





             Test Item    Value        Reference Range Interpretation Comments

 

             POCT GLU (test code = 1452161058) 177 mg/dL           H      

      

 

             Lab Interpretation (test code = Abnormal                           

    



             85878-2)                                            



[facility] GLUCOSE (AUTOMATED)2022 19:07:20





             Test Item    Value        Reference Range Interpretation Comments

 

             POCT GLU (test code = 4014636393) 185 mg/dL           H      

      

 

             Lab Interpretation (test code = Abnormal                           

    



             81300-2)                                            



[facility]





             Test Item    Value        Reference Range Interpretation Comments

 

             POCT GLU (test code = 2750439920) 185 mg/dL           H      

      

 

             Lab Interpretation (test code = Abnormal                           

    



             23345-7)                                            



[facility] -32-64 18:44:53





             Test Item    Value        Reference    Interpretation Comments



                                       Range                     

 

             TROPONIN I (test 0.000 ng/mL  See_Comment                [Automated



             code = 3815921911)                                        message] 

The



                                                                 system which



                                                                 generated this



                                                                 result



                                                                 transmitted



                                                                 reference range

:



                                                                 <=0.034. The



                                                                 reference range



                                                                 was not used to



                                                                 interpret this



                                                                 result as



                                                                 normal/abnormal

.

 

             MAL (test code = Reference (Normal)                           



             MAL)         Range (defined by                           



                          the 99th percentile                           



                          reference limit): <=                           



                          0.034 ng/mL Note:                           



                          Cardiac troponin                           



                          begins to rise 3-4                           



                          hours after the                           



                          onset of ischemia.                           



                          Repeat in 4-6 hours                           



                          if the sample was                           



                          drawn within 3-4                           



                          hours of the onset                           



                          of the symptom and                           



                          found normal.                           



                          Diagnosis of                           



                          myocardial injury is                           



                          made with acute                           



                          changes in cTn                           



                          concentrations with                           



                          at least one serial                           



                          sample above the                           



                          99th percentile                           



                          upper reference                           



                          limit (URL), taken                           



                          together with the                           



                          patient's clinical                           



                          presentation. Biotin                           



                          has been reported to                           



                          cause a negative                           



                          bias, interpret                           



                          results relative to                           



                          patient's use of                           



                          biotin.                                

 

             Lab Interpretation Normal                                 



             (test code =                                        



             13361-2)                                            



[facility] -67-89 18:44:53





             Test Item    Value        Reference    Interpretation Comments



                                       Range                     

 

             TROPONIN I (test 0.000 ng/mL  See_Comment                [Automated



             code = 1683477968)                                        message] 

The



                                                                 system which



                                                                 generated this



                                                                 result



                                                                 transmitted



                                                                 reference range

:



                                                                 <=0.034. The



                                                                 reference range



                                                                 was not used to



                                                                 interpret this



                                                                 result as



                                                                 normal/abnormal

.

 

             MAL (test code = Reference (Normal)                           



             MAL)         Range (defined by                           



                          the 99th percentile                           



                          reference limit): <=                           



                          0.034 ng/mL Note:                           



                          Cardiac troponin                           



                          begins to rise 3-4                           



                          hours after the                           



                          onset of ischemia.                           



                          Repeat in 4-6 hours                           



                          if the sample was                           



                          drawn within 3-4                           



                          hours of the onset                           



                          of the symptom and                           



                          found normal.                           



                          Diagnosis of                           



                          myocardial injury is                           



                          made with acute                           



                          changes in cTn                           



                          concentrations with                           



                          at least one serial                           



                          sample above the                           



                          99th percentile                           



                          upper reference                           



                          limit (URL), taken                           



                          together with the                           



                          patient's clinical                           



                          presentation. Biotin                           



                          has been reported to                           



                          cause a negative                           



                          bias, interpret                           



                          results relative to                           



                          patient's use of                           



                          biotin.                                

 

             Lab Interpretation Normal                                 



             (test code =                                        



             24961-8)                                            



[id number]





             Test Item    Value        Reference    Interpretation Comments



                                       Range                     

 

             TROPONIN I (test 0.000 ng/mL  See_Comment                [Automated



             code = 7693995386)                                        message] 

The



                                                                 system which



                                                                 generated this



                                                                 result



                                                                 transmitted



                                                                 reference range

:



                                                                 <=0.034. The



                                                                 reference range



                                                                 was not used to



                                                                 interpret this



                                                                 result as



                                                                 normal/abnormal

.

 

             MAL (test code = Reference (Normal)                           



             MAL)         Range (defined by                           



                          the 99th percentile                           



                          reference limit): <=                           



                          0.034 ng/mL Note:                           



                          Cardiac troponin                           



                          begins to rise 3-4                           



                          hours after the                           



                          onset of ischemia.                           



                          Repeat in 4-6 hours                           



                          if the sample was                           



                          drawn within 3-4                           



                          hours of the onset                           



                          of the symptom and                           



                          found normal.                           



                          Diagnosis of                           



                          myocardial injury is                           



                          made with acute                           



                          changes in cTn                           



                          concentrations with                           



                          at least one serial                           



                          sample above the                           



                          99th percentile                           



                          upper reference                           



                          limit (URL), taken                           



                          together with the                           



                          patient's clinical                           



                          presentation. Biotin                           



                          has been reported to                           



                          cause a negative                           



                          bias, interpret                           



                          results relative to                           



                          patient's use of                           



                          biotin.                                

 

             Lab Interpretation Normal                                 



             (test code =                                        



             93467-4)                                            



[facility]Na, K, Cl, CO2, 
Glucose, BUN, Creatinine, Ca)2022 18:27:49





             Test Item    Value        Reference Range Interpretation Comments

 

             NA (test code = 138 mmol/L   135-145                   



             [id number])                                         

 

             K (test code = 3.7 mmol/L   3.5-5                     



             5092608851)                                         

 

             CL (test code = 115 mmol/L          H            



             4350865334)                                         

 

             CO2 TOTAL (test code = 16 mmol/L    23-31        L            



             0309841715)                                         

 

             AGAP (test code =              2-16                      



             6274279545)                                         

 

             BUN (test code = 7 mg/dL      7-23                      



             1323994219)                                         

 

             GLUCOSE (test code = 195 mg/dL           H            



             [id number])                                         

 

             CREATININE (test code = 0.52 mg/dL   0.6-1.25     L            



             7628469623)                                         

 

             CALCIUM (test code = 8.2 mg/dL    8.6-10.6     L            



             5491922115)                                         

 

             eGFR (test code =              mL/min/1.73m2              



             9154794101)                                         

 

             MAL (test code = MAL) Association of                           



                          Glomerular Filtration                           



                          Rate (GFR) and Staging                           



                          of Kidney Disease*                           



                          +---------------------                           



                          --+-------------------                           



                          --+-------------------                           



                          ------+| GFR                           



                          (mL/min/1.73 m2) ?|                           



                          With Kidney Damage ?|                           



                          ?Without Kidney                           



                          Damage+---------------                           



                          --------+-------------                           



                          --------+-------------                           



                          ------------+| ?>90 ?                           



                          ? ? ? ? ? ? ? ?|                           



                          ?Stage one ? ? ? ? ?|                           



                          ? Normal ? ? ? ? ? ? ?                           



                          ?+--------------------                           



                          ---+------------------                           



                          ---+------------------                           



                          -------+| ?60-89 ? ? ?                           



                          ? ? ? ? ?| ?Stage two                           



                          ? ? ? ? ?| ? Decreased                           



                          GFR ? ? ? ?                            



                          +---------------------                           



                          --+-------------------                           



                          --+-------------------                           



                          ------+| ?30-59 ? ? ?                           



                          ? ? ? ? ?| ?Stage                           



                          three ? ? ? ?| ? Stage                           



                          three ? ? ? ? ?                           



                          +---------------------                           



                          --+-------------------                           



                          --+-------------------                           



                          ------+| ?15-29 ? ? ?                           



                          ? ? ? ? ?| ?Stage four                           



                          ? ? ? ? | ? Stage four                           



                          ? ? ? ? ?                              



                          ?+--------------------                           



                          ---+------------------                           



                          ---+------------------                           



                          -------+| ?<15 (or                           



                          dialysis) ? ?| ?Stage                           



                          five ? ? ? ? | ? Stage                           



                          five ? ? ? ? ?                           



                          ?+--------------------                           



                          ---+------------------                           



                          ---+------------------                           



                          -------+ *Each stage                           



                          assumes the associated                           



                          GFR level has been in                           



                          effect for at least                           



                          three months. ?Stages                           



                          1 to 5, with or                           



                          without kidney                           



                          disease, indicate                           



                          chronic kidney                           



                          disease. Notes:                           



                          Determination of                           



                          stages one and two                           



                          (with eGFR                             



                          >59mL/min/1.73 m2)                           



                          requires estimation of                           



                          kidney damage for at                           



                          least three months as                           



                          defined by structural                           



                          or functional                           



                          abnormalities of the                           



                          kidney, manifested by                           



                          either:Pathological                           



                          abnormalities or                           



                          Markers of kidney                           



                          damage (including                           



                          abnormalities in the                           



                          composition of the                           



                          blood or urine or                           



                          abnormalities in                           



                          imaging tests).                           

 

             Lab Interpretation Abnormal                               



             (test code = 07624-8)                                        



[facility] (Na, K, Cl, CO2, 
Glucose, BUN, Creatinine, Ca)2022 18:27:49





             Test Item    Value        Reference Range Interpretation Comments

 

             NA (test code = 138 mmol/L   135-145                   



             [id number])                                         

 

             K (test code = 3.7 mmol/L   3.5-5                     



             6217522182)                                         

 

             CL (test code = 115 mmol/L          H            



             2956637448)                                         

 

             CO2 TOTAL (test code = 16 mmol/L    23-31        L            



             7898982663)                                         

 

             AGAP (test code =              2-16                      



             8397162479)                                         

 

             BUN (test code = 7 mg/dL      7-23                      



             0310600319)                                         

 

             GLUCOSE (test code = 195 mg/dL           H            



             0097637024)                                         

 

             CREATININE (test code = 0.52 mg/dL   0.6-1.25     L            



             9533270503)                                         

 

             CALCIUM (test code = 8.2 mg/dL    8.6-10.6     L            



             6980120123)                                         

 

             eGFR (test code =              mL/min/1.73m2              



             1033626761)                                         

 

             MAL (test code = MAL) Association of                           



                          Glomerular Filtration                           



                          Rate (GFR) and Staging                           



                          of Kidney Disease*                           



                          +---------------------                           



                          --+-------------------                           



                          --+-------------------                           



                          ------+| GFR                           



                          (mL/min/1.73 m2) ?|                           



                          With Kidney Damage ?|                           



                          ?Without Kidney                           



                          Damage+---------------                           



                          --------+-------------                           



                          --------+-------------                           



                          ------------+| ?>90 ?                           



                          ? ? ? ? ? ? ? ?|                           



                          ?Stage one ? ? ? ? ?|                           



                          ? Normal ? ? ? ? ? ? ?                           



                          ?+--------------------                           



                          ---+------------------                           



                          ---+------------------                           



                          -------+| ?60-89 ? ? ?                           



                          ? ? ? ? ?| ?Stage two                           



                          ? ? ? ? ?| ? Decreased                           



                          GFR ? ? ? ?                            



                          +---------------------                           



                          --+-------------------                           



                          --+-------------------                           



                          ------+| ?30-59 ? ? ?                           



                          ? ? ? ? ?| ?Stage                           



                          three ? ? ? ?| ? Stage                           



                          three ? ? ? ? ?                           



                          +---------------------                           



                          --+-------------------                           



                          --+-------------------                           



                          ------+| ?15-29 ? ? ?                           



                          ? ? ? ? ?| ?Stage four                           



                          ? ? ? ? | ? Stage four                           



                          ? ? ? ? ?                              



                          ?+--------------------                           



                          ---+------------------                           



                          ---+------------------                           



                          -------+| ?<15 (or                           



                          dialysis) ? ?| ?Stage                           



                          five ? ? ? ? | ? Stage                           



                          five ? ? ? ? ?                           



                          ?+--------------------                           



                          ---+------------------                           



                          ---+------------------                           



                          -------+ *Each stage                           



                          assumes the associated                           



                          GFR level has been in                           



                          effect for at least                           



                          three months. ?Stages                           



                          1 to 5, with or                           



                          without kidney                           



                          disease, indicate                           



                          chronic kidney                           



                          disease. Notes:                           



                          Determination of                           



                          stages one and two                           



                          (with eGFR                             



                          >59mL/min/1.73 m2)                           



                          requires estimation of                           



                          kidney damage for at                           



                          least three months as                           



                          defined by structural                           



                          or functional                           



                          abnormalities of the                           



                          kidney, manifested by                           



                          either:Pathological                           



                          abnormalities or                           



                          Markers of kidney                           



                          damage (including                           



                          abnormalities in the                           



                          composition of the                           



                          blood or urine or                           



                          abnormalities in                           



                          imaging tests).                           

 

             Lab Interpretation Abnormal                               



             (test code = 75601-0)                                        



[id number]43:01





             Test Item    Value        Reference Range Interpretation Comments

 

             BOH (test code = 3.3 mmol/L                             



             3353952536)                                         

 

             MAL (test code = Normal Ranges: ? ?                           



             MAL)         Nonfasting ? ? ? ? ? Less                           



                          than 0.1 mmol/L ? ?                           



                          Overnight Fast ? ? ? Less                           



                          than 0.4 mmol/L ? ? Fasting                           



                          (1-2 weeks) ?6-8 mmol/L Test                          

 



                          developed and                           



                          characteristics determined                           



                          by [facility].                          

 



[facility]-[id number] 17:43:01





             Test Item    Value        Reference Range Interpretation Comments

 

             BOH (test code = 3.3 mmol/L                             



             5916789586)                                         

 

             MAL (test code = Normal Ranges: ? ?                           



             MAL)         Nonfasting ? ? ? ? ? Less                           



                          than 0.1 mmol/L ? ?                           



                          Overnight Fast ? ? ? Less                           



                          than 0.4 mmol/L ? ? Fasting                           



                          (1-2 weeks) ?6-8 mmol/L                           



                          Test developed and                           



                          characteristics determined                           



                          by [facility].                          

 



[facility] 17[facility]05





             Test Item    Value        Reference Range Interpretation Comments

 

             POCT GLU (test code = 0779140946) 192 mg/dL           H      

      

 

             Lab Interpretation (test code = Abnormal                           

    



             07990-7)                                            



[facility]2022 17:29:05





             Test Item    Value        Reference Range Interpretation Comments

 

             POCT GLU (test code = 5992284234) 192 mg/dL           H      

      

 

             Lab Interpretation (test code = Abnormal                           

    



             84438-1)                                            



[facility] [id number] 16:28:42





             Test Item    Value        Reference Range Interpretation Comments

 

             OSMOLALITY (test code =              See_Comment  H             [Au

tomated message]



             2692-2)                                             The system [employer]



                                                                 generated this 

result



                                                                 transmitted ref

erence



                                                                 range: 278 - 30

5



                                                                 mOsm/kg. The



                                                                 reference range

 was



                                                                 not used to int

erpret



                                                                 this result as



                                                                 normal/abnormal

.

 

             Lab Interpretation (test Abnormal                               



             code = 98399-8)                                        



[facility] [id number] 16:28:42





             Test Item    Value        Reference Range Interpretation Comments

 

             OSMOLALITY (test code =              See_Comment  H             [Au

tomated message]



             2692-2)                                             The system [employer]



                                                                 generated this 

result



                                                                 transmitted ref

erence



                                                                 range: 278 - 30

5



                                                                 mOsm/kg. The



                                                                 reference range

 was



                                                                 not used to int

erpret



                                                                 this result as



                                                                 normal/abnormal

.

 

             Lab Interpretation (test Abnormal                               



             code = 08135-8)                                        



[id number]42





             Test Item    Value        Reference Range Interpretation Comments

 

             OSMOLALITY (test code =              See_Comment  H             [Au

tomated message]



             2692-2)                                             The system [employer]



                                                                 generated this 

result



                                                                 transmitted ref

erence



                                                                 range: 278 - 30

5



                                                                 mOsm/kg. The



                                                                 reference range

 was



                                                                 not used to int

erpret



                                                                 this result as



                                                                 normal/abnormal

.

 

             Lab Interpretation (test Abnormal                               



             code = 05757-2)                                        



[facility] Metabolic Panel (Na, K, Cl, CO2, 
Glucose, BUN, Creatinine, Ca)2022 16:02:27





             Test Item    Value        Reference Range Interpretation Comments

 

             NA (test code = 138 mmol/L   135-145                   



             [id number])                                         

 

             K (test code = 3.7 mmol/L   3.5-5                     



             1765661085)                                         

 

             CL (test code = 117 mmol/L          H            



             7490573421)                                         

 

             CO2 TOTAL (test code = 15 mmol/L    23-31        L            



             2275993775)                                         

 

             AGAP (test code =              2-16                      



             6264042179)                                         

 

             BUN (test code = 7 mg/dL      7-23                      



             5388763560)                                         

 

             GLUCOSE (test code = 204 mg/dL           H            



             6103701692)                                         

 

             CREATININE (test code = 0.41 mg/dL   0.6-1.25     L            



             9893413527)                                         

 

             CALCIUM (test code = 8.3 mg/dL    8.6-10.6     L            



             6299986405)                                         

 

             eGFR (test code =              mL/min/1.73m2              



             1168154513)                                         

 

             MAL (test code = MAL) Association of                           



                          Glomerular Filtration                           



                          Rate (GFR) and Staging                           



                          of Kidney Disease*                           



                          +---------------------                           



                          --+-------------------                           



                          --+-------------------                           



                          ------+| GFR                           



                          (mL/min/1.73 m2) ?|                           



                          With Kidney Damage ?|                           



                          ?Without Kidney                           



                          Damage+---------------                           



                          --------+-------------                           



                          --------+-------------                           



                          ------------+| ?>90 ?                           



                          ? ? ? ? ? ? ? ?|                           



                          ?Stage one ? ? ? ? ?|                           



                          ? Normal ? ? ? ? ? ? ?                           



                          ?+--------------------                           



                          ---+------------------                           



                          ---+------------------                           



                          -------+| ?60-89 ? ? ?                           



                          ? ? ? ? ?| ?Stage two                           



                          ? ? ? ? ?| ? Decreased                           



                          GFR ? ? ? ?                            



                          +---------------------                           



                          --+-------------------                           



                          --+-------------------                           



                          ------+| ?30-59 ? ? ?                           



                          ? ? ? ? ?| ?Stage                           



                          three ? ? ? ?| ? Stage                           



                          three ? ? ? ? ?                           



                          +---------------------                           



                          --+-------------------                           



                          --+-------------------                           



                          ------+| ?15-29 ? ? ?                           



                          ? ? ? ? ?| ?Stage four                           



                          ? ? ? ? | ? Stage four                           



                          ? ? ? ? ?                              



                          ?+--------------------                           



                          ---+------------------                           



                          ---+------------------                           



                          -------+| ?<15 (or                           



                          dialysis) ? ?| ?Stage                           



                          five ? ? ? ? | ? Stage                           



                          five ? ? ? ? ?                           



                          ?+--------------------                           



                          ---+------------------                           



                          ---+------------------                           



                          -------+ *Each stage                           



                          assumes the associated                           



                          GFR level has been in                           



                          effect for at least                           



                          three months. ?Stages                           



                          1 to 5, with or                           



                          without kidney                           



                          disease, indicate                           



                          chronic kidney                           



                          disease. Notes:                           



                          Determination of                           



                          stages one and two                           



                          (with eGFR                             



                          >59mL/min/1.73 m2)                           



                          requires estimation of                           



                          kidney damage for at                           



                          least three months as                           



                          defined by structural                           



                          or functional                           



                          abnormalities of the                           



                          kidney, manifested by                           



                          either:Pathological                           



                          abnormalities or                           



                          Markers of kidney                           



                          damage (including                           



                          abnormalities in the                           



                          composition of the                           



                          blood or urine or                           



                          abnormalities in                           



                          imaging tests).                           

 

             Lab Interpretation Abnormal                               



             (test code = 00554-7)                                        



[facility] Metabolic Panel (Na, K, Cl, CO2, 
Glucose, BUN, Creatinine, Ca)2022 16:02:27





             Test Item    Value        Reference Range Interpretation Comments

 

             NA (test code = 138 mmol/L   135-145                   



             6430706003)                                         

 

             K (test code = 3.7 mmol/L   3.5-5                     



             9938413111)                                         

 

             CL (test code = 117 mmol/L          H            



             6987177792)                                         

 

             CO2 TOTAL (test code = 15 mmol/L    23-31        L            



             5840347022)                                         

 

             AGAP (test code =              2-16                      



             7671199576)                                         

 

             BUN (test code = 7 mg/dL      7-23                      



             2374246634)                                         

 

             GLUCOSE (test code = 204 mg/dL           H            



             6192439918)                                         

 

             CREATININE (test code = 0.41 mg/dL   0.6-1.25     L            



             7593130246)                                         

 

             CALCIUM (test code = 8.3 mg/dL    8.6-10.6     L            



             6638271021)                                         

 

             eGFR (test code =              mL/min/1.73m2              



             2931996110)                                         

 

             MAL (test code = MAL) Association of                           



                          Glomerular Filtration                           



                          Rate (GFR) and Staging                           



                          of Kidney Disease*                           



                          +---------------------                           



                          --+-------------------                           



                          --+-------------------                           



                          ------+| GFR                           



                          (mL/min/1.73 m2) ?|                           



                          With Kidney Damage ?|                           



                          ?Without Kidney                           



                          Damage+---------------                           



                          --------+-------------                           



                          --------+-------------                           



                          ------------+| ?>90 ?                           



                          ? ? ? ? ? ? ? ?|                           



                          ?Stage one ? ? ? ? ?|                           



                          ? Normal ? ? ? ? ? ? ?                           



                          ?+--------------------                           



                          ---+------------------                           



                          ---+------------------                           



                          -------+| ?60-89 ? ? ?                           



                          ? ? ? ? ?| ?Stage two                           



                          ? ? ? ? ?| ? Decreased                           



                          GFR ? ? ? ?                            



                          +---------------------                           



                          --+-------------------                           



                          --+-------------------                           



                          ------+| ?30-59 ? ? ?                           



                          ? ? ? ? ?| ?Stage                           



                          three ? ? ? ?| ? Stage                           



                          three ? ? ? ? ?                           



                          +---------------------                           



                          --+-------------------                           



                          --+-------------------                           



                          ------+| ?15-29 ? ? ?                           



                          ? ? ? ? ?| ?Stage four                           



                          ? ? ? ? | ? Stage four                           



                          ? ? ? ? ?                              



                          ?+--------------------                           



                          ---+------------------                           



                          ---+------------------                           



                          -------+| ?<15 (or                           



                          dialysis) ? ?| ?Stage                           



                          five ? ? ? ? | ? Stage                           



                          five ? ? ? ? ?                           



                          ?+--------------------                           



                          ---+------------------                           



                          ---+------------------                           



                          -------+ *Each stage                           



                          assumes the associated                           



                          GFR level has been in                           



                          effect for at least                           



                          three months. ?Stages                           



                          1 to 5, with or                           



                          without kidney                           



                          disease, indicate                           



                          chronic kidney                           



                          disease. Notes:                           



                          Determination of                           



                          stages one and two                           



                          (with eGFR                             



                          >59mL/min/1.73 m2)                           



                          requires estimation of                           



                          kidney damage for at                           



                          least three months as                           



                          defined by structural                           



                          or functional                           



                          abnormalities of the                           



                          kidney, manifested by                           



                          either:Pathological                           



                          abnormalities or                           



                          Markers of kidney                           



                          damage (including                           



                          abnormalities in the                           



                          composition of the                           



                          blood or urine or                           



                          abnormalities in                           



                          imaging tests).                           

 

             Lab Interpretation Abnormal                               



             (test code = 75133-9)                                        



[facility]2022 15:54:35





             Test Item    Value        Reference Range Interpretation Comments

 

             POCT GLU (test code = 8036133257) 200 mg/dL           H      

      

 

             Lab Interpretation (test code = Abnormal                           

    



             56902-9)                                            



[facility] (AUTOM[facility])2022 15:54:35





             Test Item    Value        Reference Range Interpretation Comments

 

             POCT GLU (test code = 8429306051) 200 mg/dL           H      

      

 

             Lab Interpretation (test code = Abnormal                           

    



             50219-5)                                            



[facility] (AUTOM[facility])2022 14:57:17





             Test Item    Value        Reference Range Interpretation Comments

 

             POCT GLU (test code = 0512412422) 211 mg/dL           H      

      

 

             Lab Interpretation (test code = Abnormal                           

    



             30727-2)                                            



[facility]SE (AUTOMATED)2022 14:57:17





             Test Item    Value        Reference Range Interpretation Comments

 

             POCT GLU (test code = 7362264367) 211 mg/dL           H      

      

 

             Lab Interpretation (test code = Abnormal                           

    



             42052-8)                                            



[facility] GLUCOSE (AUTOMATED)2022 13:50:06





             Test Item    Value        Reference Range Interpretation Comments

 

             POCT GLU (test code = 5615403079) 216 mg/dL           H      

      

 

             Lab Interpretation (test code = Abnormal                           

    



             98903-8)                                            



[facility] GLUCOSE (AUTOMATED)2022 13:50:06





             Test Item    Value        Reference Range Interpretation Comments

 

             POCT GLU (test code = 1341208197) 216 mg/dL           H      

      

 

             Lab Interpretation (test code = Abnormal                           

    



             09859-0)                                            



[facility] GLUCOSE (AUTOMATED)2022 10:43:13





             Test Item    Value        Reference Range Interpretation Comments

 

             POCT GLU (test code = 0906762803) 199 mg/dL           H      

      

 

             Lab Interpretation (test code = Abnormal                           

    



             71937-4)                                            



[facility] GLUCOSE (AUTOMATED)2022 10:43:13





             Test Item    Value        Reference Range Interpretation Comments

 

             POCT GLU (test code = 1628541942) 199 mg/dL           H      

      

 

             Lab Interpretation (test code = Abnormal                           

    



             59035-9)                                            



[facility] GLUCOSE (AUTOMATED)2022 09:31:59





             Test Item    Value        Reference Range Interpretation Comments

 

             POCT GLU (test code = 7626268368) 172 mg/dL           H      

      

 

             Lab Interpretation (test code = Abnormal                           

    



             49264-4)                                            



[facility] GLUCOSE (AUTOMATED)2022 09:31:59





             Test Item    Value        Reference Range Interpretation Comments

 

             POCT GLU (test code = 2422017594) 172 mg/dL           H      

      

 

             Lab Interpretation (test code = Abnormal                           

    



             86503-8)                                            



[facility] Metabolic Panel (Na, K, Cl, CO2, 
Glucose, BUN, Creatinine, Ca)2022 08:02:13





             Test Item    Value        Reference Range Interpretation Comments

 

             NA (test code = 140 mmol/L   135-145                   



             2997140586)                                         

 

             K (test code = 3.8 mmol/L   3.5-5                     



             9775112061)                                         

 

             CL (test code = 110 mmol/L          H            



             3609653400)                                         

 

             CO2 TOTAL (test code = 13 mmol/L    23-31        L            



             0899818298)                                         

 

             AGAP (test code =              2-16         H            



             2952280947)                                         

 

             BUN (test code = 10 mg/dL     7-23                      



             4274177480)                                         

 

             GLUCOSE (test code = 223 mg/dL           H            



             1275607948)                                         

 

             CREATININE (test code = 0.58 mg/dL   0.6-1.25     L            



             4522491577)                                         

 

             CALCIUM (test code = 8.8 mg/dL    8.6-10.6                  



             0812484050)                                         

 

             eGFR (test code =              mL/min/1.73m2              



             6564948385)                                         

 

             MAL (test code = MAL) Association of                           



                          Glomerular Filtration                           



                          Rate (GFR) and Staging                           



                          of Kidney Disease*                           



                          +---------------------                           



                          --+-------------------                           



                          --+-------------------                           



                          ------+| GFR                           



                          (mL/min/1.73 m2) ?|                           



                          With Kidney Damage ?|                           



                          ?Without Kidney                           



                          Damage+---------------                           



                          --------+-------------                           



                          --------+-------------                           



                          ------------+| ?>90 ?                           



                          ? ? ? ? ? ? ? ?|                           



                          ?Stage one ? ? ? ? ?|                           



                          ? Normal ? ? ? ? ? ? ?                           



                          ?+--------------------                           



                          ---+------------------                           



                          ---+------------------                           



                          -------+| ?60-89 ? ? ?                           



                          ? ? ? ? ?| ?Stage two                           



                          ? ? ? ? ?| ? Decreased                           



                          GFR ? ? ? ?                            



                          +---------------------                           



                          --+-------------------                           



                          --+-------------------                           



                          ------+| ?30-59 ? ? ?                           



                          ? ? ? ? ?| ?Stage                           



                          three ? ? ? ?| ? Stage                           



                          three ? ? ? ? ?                           



                          +---------------------                           



                          --+-------------------                           



                          --+-------------------                           



                          ------+| ?15-29 ? ? ?                           



                          ? ? ? ? ?| ?Stage four                           



                          ? ? ? ? | ? Stage four                           



                          ? ? ? ? ?                              



                          ?+--------------------                           



                          ---+------------------                           



                          ---+------------------                           



                          -------+| ?<15 (or                           



                          dialysis) ? ?| ?Stage                           



                          five ? ? ? ? | ? Stage                           



                          five ? ? ? ? ?                           



                          ?+--------------------                           



                          ---+------------------                           



                          ---+------------------                           



                          -------+ *Each stage                           



                          assumes the associated                           



                          GFR level has been in                           



                          effect for at least                           



                          three months. ?Stages                           



                          1 to 5, with or                           



                          without kidney                           



                          disease, indicate                           



                          chronic kidney                           



                          disease. Notes:                           



                          Determination of                           



                          stages one and two                           



                          (with eGFR                             



                          >59mL/min/1.73 m2)                           



                          requires estimation of                           



                          kidney damage for at                           



                          least three months as                           



                          defined by structural                           



                          or functional                           



                          abnormalities of the                           



                          kidney, manifested by                           



                          either:Pathological                           



                          abnormalities or                           



                          Markers of kidney                           



                          damage (including                           



                          abnormalities in the                           



                          composition of the                           



                          blood or urine or                           



                          abnormalities in                           



                          imaging tests).                           

 

             Lab Interpretation Abnormal                               



             (test code = 56644-7)                                        



[facility] Metabolic Panel (Na, K, Cl, CO2, 
Glucose, BUN, Creatinine, Ca)2022 08:02:13





             Test Item    Value        Reference Range Interpretation Comments

 

             NA (test code = 140 mmol/L   135-145                   



             [id number])                                         

 

             K (test code = 3.8 mmol/L   3.5-5                     



             2165849376)                                         

 

             CL (test code = 110 mmol/L          H            



             9190410667)                                         

 

             CO2 TOTAL (test code = 13 mmol/L    23-31        L            



             5006690617)                                         

 

             AGAP (test code =              2-16         H            



             2837703882)                                         

 

             BUN (test code = 10 mg/dL     7-23                      



             7660419401)                                         

 

             GLUCOSE (test code = 223 mg/dL           H            



             3257362242)                                         

 

             CREATININE (test code = 0.58 mg/dL   0.6-1.25     L            



             4747665726)                                         

 

             CALCIUM (test code = 8.8 mg/dL    8.6-10.6                  



             4837635272)                                         

 

             eGFR (test code =              mL/min/1.73m2              



             1252651926)                                         

 

             MAL (test code = MAL) Association of                           



                          Glomerular Filtration                           



                          Rate (GFR) and Staging                           



                          of Kidney Disease*                           



                          +---------------------                           



                          --+-------------------                           



                          --+-------------------                           



                          ------+| GFR                           



                          (mL/min/1.73 m2) ?|                           



                          With Kidney Damage ?|                           



                          ?Without Kidney                           



                          Damage+---------------                           



                          --------+-------------                           



                          --------+-------------                           



                          ------------+| ?>90 ?                           



                          ? ? ? ? ? ? ? ?|                           



                          ?Stage one ? ? ? ? ?|                           



                          ? Normal ? ? ? ? ? ? ?                           



                          ?+--------------------                           



                          ---+------------------                           



                          ---+------------------                           



                          -------+| ?60-89 ? ? ?                           



                          ? ? ? ? ?| ?Stage two                           



                          ? ? ? ? ?| ? Decreased                           



                          GFR ? ? ? ?                            



                          +---------------------                           



                          --+-------------------                           



                          --+-------------------                           



                          ------+| ?30-59 ? ? ?                           



                          ? ? ? ? ?| ?Stage                           



                          three ? ? ? ?| ? Stage                           



                          three ? ? ? ? ?                           



                          +---------------------                           



                          --+-------------------                           



                          --+-------------------                           



                          ------+| ?15-29 ? ? ?                           



                          ? ? ? ? ?| ?Stage four                           



                          ? ? ? ? | ? Stage four                           



                          ? ? ? ? ?                              



                          ?+--------------------                           



                          ---+------------------                           



                          ---+------------------                           



                          -------+| ?<15 (or                           



                          dialysis) ? ?| ?Stage                           



                          five ? ? ? ? | ? Stage                           



                          five ? ? ? ? ?                           



                          ?+--------------------                           



                          ---+------------------                           



                          ---+------------------                           



                          -------+ *Each stage                           



                          assumes the associated                           



                          GFR level has been in                           



                          effect for at least                           



                          three months. ?Stages                           



                          1 to 5, with or                           



                          without kidney                           



                          disease, indicate                           



                          chronic kidney                           



                          disease. Notes:                           



                          Determination of                           



                          stages one and two                           



                          (with eGFR                             



                          >59mL/min/1.73 m2)                           



                          requires estimation of                           



                          kidney damage for at                           



                          least three months as                           



                          defined by structural                           



                          or functional                           



                          abnormalities of the                           



                          kidney, manifested by                           



                          either:Pathological                           



                          abnormalities or                           



                          Markers of kidney                           



                          damage (including                           



                          abnormalities in the                           



                          composition of the                           



                          blood or urine or                           



                          abnormalities in                           



                          imaging tests).                           

 

             Lab Interpretation Abnormal                               



             (test code = 43016-7)                                        



[facility]2022 07:26:01





             Test Item    Value        Reference Range Interpretation Comments

 

             POCT GLU (test code = 5442772880) 245 mg/dL           H      

      

 

             Lab Interpretation (test code = Abnormal                           

    



             67358-7)                                            



[facility] GLU[facility]SE (AUTOMATED)2022 07:26:01





             Test Item    Value        Reference Range Interpretation Comments

 

             POCT GLU (test code = 5328368513) 245 mg/dL           H      

      

 

             Lab Interpretation (test code = Abnormal                           

    



             42367-6)                                            



[facility] hospital GLUCOSE(AGE &gt;30DAYS)2022 
07:11:00





             Test Item    Value        Reference Range Interpretation Comments

 

             POCT Glu (age>30days) (test code = 245 mg/dL                 

       



             3342)                                               

 

             Lab Interpretation (test code = Normal                             

    



             43253-8)                                            



[facility] GLUCOSE(AGE &gt;30DAYS)2022 
07:11:00





             Test Item    Value        Reference Range Interpretation Comments

 

             POCT Glu (age>30days) (test code = 245 mg/dL                 

       



             3342)                                               

 

             Lab Interpretation (test code = Normal                             

    



             98007-6)                                            



[facility] GLUCOSE(AGE &gt;30DAYS)2022 
07:11:00





             Test Item    Value        Reference Range Interpretation Comments

 

             POCT Glu (age>30days) (test code = 245 mg/dL                 

       



             3342)                                               

 

             Lab Interpretation (test code = Normal                             

    



             15254-7)                                            



[facility]





             Test Item    Value        Reference Range Interpretation Comments

 

             HGB A1C (test code = 12.7 %       4-5.7        H            



             4548-4)                                             

 

             MAL (test code = MAL) Reference                              



                          RangesNormal:                           



                          <5.7%Prediabetes:                           



                          5.7 - 6.4%Diabetes:                           



                          > 6.5%                                 

 

             Lab Interpretation (test Abnormal                               



             code = 14258-7)                                        



[facility]:57:25





             Test Item    Value        Reference Range Interpretation Comments

 

             HGB A1C (test code = 12.7 %       4-5.7        H            



             4548-4)                                             

 

             MAL (test code = MAL) Reference                              



                          RangesNormal:                           



                          <5.7%Prediabetes:                           



                          5.7 - 6.4%Diabetes:                           



                          > 6.5%                                 

 

             Lab Interpretation (test Abnormal                               



             code = 14736-6)                                        



[facility]:57:25





             Test Item    Value        Reference Range Interpretation Comments

 

             HGB A1C (test code = 12.7 %       4-5.7        H            



             4548-4)                                             

 

             MAL (test code = MAL) Reference                              



                          RangesNormal:                           



                          <5.7%Prediabetes:                           



                          5.7 - 6.4%Diabetes:                           



                          > 6.5%                                 

 

             Lab Interpretation (test Abnormal                               



             code = 01886-2)                                        



[facility] [id number] 05:46:44





             Test Item    Value        Reference Range Interpretation Comments

 

             MAGNESIUM (test code = 7907533649) 1.7 mg/dL    1.7-2.4            

       

 

             Lab Interpretation (test code = Normal                             

    



             59928-0)                                            



[facility] [id number] 05:46:44





             Test Item    Value        Reference Range Interpretation Comments

 

             MAGNESIUM (test code = 5186974814) 1.7 mg/dL    1.7-2.4            

       

 

             Lab Interpretation (test code = Normal                             

    



             05892-0)                                            



[facility] [id number] 05:46:24





             Test Item    Value        Reference Range Interpretation Comments

 

             PHOSPHORUS (test code = 3423952668) 4.0 mg/dL    2.5-5             

        

 

             Lab Interpretation (test code = Normal                             

    



             03103-0)                                            



[facility] [id number] 05:46:24





             Test Item    Value        Reference Range Interpretation Comments

 

             PHOSPHORUS (test code = 8516061832) 4.0 mg/dL    2.5-5             

        

 

             Lab Interpretation (test code = Normal                             

    



             82622-7)                                            



[facility] [id number] 05:46:24





             Test Item    Value        Reference Range Interpretation Comments

 

             PHOSPHORUS (test code = 4167058529) 4.0 mg/dL    2.5-5             

        

 

             Lab Interpretation (test code = Normal                             

    



             79213-5)                                            



[facility]SE (AUTOM[facility])2022 05:11:10





             Test Item    Value        Reference Range Interpretation Comments

 

             POCT GLU (test code = 1764533636) 410 mg/dL           H      

      

 

             Lab Interpretation (test code = Abnormal                           

    



             23982-1)                                            



[facility]SE (AUTOM[facility])2022 05:11:10





             Test Item    Value        Reference Range Interpretation Comments

 

             POCT GLU (test code = 7056385304) 410 mg/dL           H      

      

 

             Lab Interpretation (test code = Abnormal                           

    



             27463-5)                                            



[facility] GLUCOSE(AGE &gt;30DAYS)2022 
05:11:00





             Test Item    Value        Reference Range Interpretation Comments

 

             POCT Glu (age>30days) (test code = 410 mg/dL           A     

       



             3342)                                               

 

             Lab Interpretation (test code = Abnormal                           

    



             18281-1)                                            



[facility] 
05:11:00





             Test Item    Value        Reference Range Interpretation Comments

 

             POCT Glu (age>30days) (test code = 410 mg/dL           A     

       



             3342)                                               

 

             Lab Interpretation (test code = Abnormal                           

    



             97703-1)                                            



[facility] WITH [id number] 05:03:57





             Test Item    Value        Reference Range Interpretation Comments

 

             WBC (test code =              See_Comment                [Automated



             6790-2)                                             message] The sy

stem



                                                                 which generated



                                                                 this result



                                                                 transmitted



                                                                 reference range

:



                                                                 4.20 - 10.70



                                                                 10*3/?L. The



                                                                 reference range

 was



                                                                 not used to



                                                                 interpret this



                                                                 result as



                                                                 normal/abnormal

.

 

             RBC (test code =              See_Comment                [Automated



             789-8)                                              message] The sy

stem



                                                                 which generated



                                                                 this result



                                                                 transmitted



                                                                 reference range

:



                                                                 4.26 - 5.52



                                                                 10*6/?L. The



                                                                 reference range

 was



                                                                 not used to



                                                                 interpret this



                                                                 result as



                                                                 normal/abnormal

.

 

             HGB (test code = 13.9 g/dL    12.2-16.4                 



             718-7)                                              

 

             HCT (test code = 42.6 %       38.4-49.3                 



             4544-3)                                             

 

             MCV (test code = 88.2 fL      81.7-95.6                 



             787-2)                                              

 

             MCH (test code = 28.8 pg      26.1-32.7                 



             785-6)                                              

 

             MCHC (test code = 32.6 g/dL    31.2-35                   



             786-4)                                              

 

             RDW-SD (test code = 44.1 fL      38.5-51.6                 



             00634-1)                                            

 

             RDW-CV (test code = 13.8 %       12.1-15.4                 



             788-0)                                              

 

             PLT (test code =              See_Comment  H             [Automated



             777-3)                                              message] The sy

stem



                                                                 which generated



                                                                 this result



                                                                 transmitted



                                                                 reference range

:



                                                                 150 - 328 10*3/

?L.



                                                                 The reference r

zhen



                                                                 was not used to



                                                                 interpret this



                                                                 result as



                                                                 normal/abnormal

.

 

             MPV (test code = 12.2 fL      9.8-13                    



             79701-4)                                            

 

             IPF % (test code = 9.4 %        1.2-10.7                  Platelet 

count



             [id number])                                         measured by



                                                                 fluorescence



                                                                 method.

 

             NRBC/100 WBC (test              See_Comment                [Automat

ed



             code = [id number])                                        message] 

The system



                                                                 which generated



                                                                 this result



                                                                 transmitted



                                                                 reference range

:



                                                                 0.0 - 10.0 /100



                                                                 WBCs. The refer

ence



                                                                 range was not u

sed



                                                                 to interpret th

is



                                                                 result as



                                                                 normal/abnormal

.

 

             NRBC x10^3 (test code              See_Comment                [Auto

mated



             = [id number])                                        message] The s

ystem



                                                                 which generated



                                                                 this result



                                                                 transmitted



                                                                 reference range

:



                                                                 10*3/?L. The



                                                                 reference range

 was



                                                                 not used to



                                                                 interpret this



                                                                 result as



                                                                 normal/abnormal

.

 

             GRAN MAT (NEUT) % 65.1 %                                 



             (test code = 770-8)                                        

 

             IMM GRAN % (test code 1.10 %                                 



             = 8705879176)                                        

 

             LYMPH % (test code = 22.4 %                                 



             736-9)                                              

 

             MONO % (test code = 9.8 %                                  



             5905-5)                                             

 

             EOS % (test code = 1.4 %                                  



             713-8)                                              

 

             BASO % (test code = 0.2 %                                  



             706-2)                                              

 

             GRAN MAT x10^3(ANC) 6.15 10*3/uL 1.99-6.95                 



             (test code =                                        



             7561584284)                                         

 

             IMM GRAN x10^3 (test 0.10 10*3/uL 0-0.06       H            



             code = 9614332022)                                        

 

             LYMPH x10^3 (test code 2.11 10*3/uL 1.09-3.23                 



             = 731-0)                                            

 

             MONO x10^3 (test code 0.92 10*3/uL 0.36-1.02                 



             = 742-7)                                            

 

             EOS x10^3 (test code = 0.13 10*3/uL 0.06-0.53                 



             711-2)                                              

 

             BASO x10^3 (test code              0.01-0.09                 



             = 704-7)                                            

 

             Lab Interpretation Abnormal                               



             (test code = 34860-3)                                        



[facility] WITH [id number] 05:03:57





             Test Item    Value        Reference Range Interpretation Comments

 

             WBC (test code =              See_Comment                [Automated



             6690-2)                                             message] The sy

stem



                                                                 which generated



                                                                 this result



                                                                 transmitted



                                                                 reference range

:



                                                                 4.20 - 10.70



                                                                 10*3/?L. The



                                                                 reference range

 was



                                                                 not used to



                                                                 interpret this



                                                                 result as



                                                                 normal/abnormal

.

 

             RBC (test code =              See_Comment                [Automated



             789-8)                                              message] The sy

stem



                                                                 which generated



                                                                 this result



                                                                 transmitted



                                                                 reference range

:



                                                                 4.26 - 5.52



                                                                 10*6/?L. The



                                                                 reference range

 was



                                                                 not used to



                                                                 interpret this



                                                                 result as



                                                                 normal/abnormal

.

 

             HGB (test code = 13.9 g/dL    12.2-16.4                 



             718-7)                                              

 

             HCT (test code = 42.6 %       38.4-49.3                 



             4544-3)                                             

 

             MCV (test code = 88.2 fL      81.7-95.6                 



             787-2)                                              

 

             MCH (test code = 28.8 pg      26.1-32.7                 



             785-6)                                              

 

             MCHC (test code = 32.6 g/dL    31.2-35                   



             786-4)                                              

 

             RDW-SD (test code = 44.1 fL      38.5-51.6                 



             57738-1)                                            

 

             RDW-CV (test code = 13.8 %       12.1-15.4                 



             788-0)                                              

 

             PLT (test code =              See_Comment  H             [Automated



             777-3)                                              message] The sy

stem



                                                                 which generated



                                                                 this result



                                                                 transmitted



                                                                 reference range

:



                                                                 150 - 328 10*3/

?L.



                                                                 The reference r

zhen



                                                                 was not used to



                                                                 interpret this



                                                                 result as



                                                                 normal/abnormal

.

 

             MPV (test code = 12.2 fL      9.8-13                    



             29860-4)                                            

 

             IPF % (test code = 9.4 %        1.2-10.7                  Platelet 

count



             [id number])                                         measured by



                                                                 fluorescence



                                                                 method.

 

             NRBC/100 WBC (test              See_Comment                [Automat

ed



             code = 4491743216)                                        message] 

The system



                                                                 which generated



                                                                 this result



                                                                 transmitted



                                                                 reference range

:



                                                                 0.0 - 10.0 /100



                                                                 WBCs. The refer

ence



                                                                 range was not u

sed



                                                                 to interpret th

is



                                                                 result as



                                                                 normal/abnormal

.

 

             NRBC x10^3 (test code              See_Comment                [Auto

mated



             = 7160694946)                                        message] The s

ystem



                                                                 which generated



                                                                 this result



                                                                 transmitted



                                                                 reference range

:



                                                                 10*3/?L. The



                                                                 reference range

 was



                                                                 not used to



                                                                 interpret this



                                                                 result as



                                                                 normal/abnormal

.

 

             GRAN MAT (NEUT) % 65.1 %                                 



             (test code = 770-8)                                        

 

             IMM GRAN % (test code 1.10 %                                 



             = 9788027170)                                        

 

             LYMPH % (test code = 22.4 %                                 



             736-9)                                              

 

             MONO % (test code = 9.8 %                                  



             5905-5)                                             

 

             EOS % (test code = 1.4 %                                  



             713-8)                                              

 

             BASO % (test code = 0.2 %                                  



             706-2)                                              

 

             GRAN MAT x10^3(ANC) 6.15 10*3/uL 1.99-6.95                 



             (test code =                                        



             6006902508)                                         

 

             IMM GRAN x10^3 (test 0.10 10*3/uL 0-0.06       H            



             code = 6105447455)                                        

 

             LYMPH x10^3 (test code 2.11 10*3/uL 1.09-3.23                 



             = 731-0)                                            

 

             MONO x10^3 (test code 0.92 10*3/uL 0.36-1.02                 



             = 742-7)                                            

 

             EOS x10^3 (test code = 0.13 10*3/uL 0.06-0.53                 



             711-2)                                              

 

             BASO x10^3 (test code              0.01-0.09                 



             = 704-7)                                            

 

             Lab Interpretation Abnormal                               



             (test code = 17933-8)                                        



[facility] METABOLIC PANEL (NA, K, CL, CO2, 
GLUCOSE, BUN, CREATININE, CA)2022 04:46:12





             Test Item    Value        Reference Range Interpretation Comments

 

             NA (test code = 136 mmol/L   135-145                   



             6638698277)                                         

 

             K (test code = 5.1 mmol/L   3.5-5        H            



             1662896094)                                         

 

             CL (test code = 106 mmol/L                       



             1108380630)                                         

 

             CO2 TOTAL (test code = 9 mmol/L     23-31        L            



             1401780971)                                         

 

             AGAP (test code =              2-16         H            



             6739888644)                                         

 

             BUN (test code = 11 mg/dL     7-23                      



             0039608410)                                         

 

             GLUCOSE (test code = 405 mg/dL           H            



             0445646474)                                         

 

             CREATININE (test code = 0.62 mg/dL   0.6-1.25                  



             0936332772)                                         

 

             CALCIUM (test code = 9.5 mg/dL    8.6-10.6                  



             8131571210)                                         

 

             eGFR (test code =              mL/min/1.73m2              



             5079437013)                                         

 

             MAL (test code = MAL) Association of                           



                          Glomerular Filtration                           



                          Rate (GFR) and Staging                           



                          of Kidney Disease*                           



                          +---------------------                           



                          --+-------------------                           



                          --+-------------------                           



                          ------+| GFR                           



                          (mL/min/1.73 m2) ?|                           



                          With Kidney Damage ?|                           



                          ?Without Kidney                           



                          Damage+---------------                           



                          --------+-------------                           



                          --------+-------------                           



                          ------------+| ?>90 ?                           



                          ? ? ? ? ? ? ? ?|                           



                          ?Stage one ? ? ? ? ?|                           



                          ? Normal ? ? ? ? ? ? ?                           



                          ?+--------------------                           



                          ---+------------------                           



                          ---+------------------                           



                          -------+| ?60-89 ? ? ?                           



                          ? ? ? ? ?| ?Stage two                           



                          ? ? ? ? ?| ? Decreased                           



                          GFR ? ? ? ?                            



                          +---------------------                           



                          --+-------------------                           



                          --+-------------------                           



                          ------+| ?30-59 ? ? ?                           



                          ? ? ? ? ?| ?Stage                           



                          three ? ? ? ?| ? Stage                           



                          three ? ? ? ? ?                           



                          +---------------------                           



                          --+-------------------                           



                          --+-------------------                           



                          ------+| ?15-29 ? ? ?                           



                          ? ? ? ? ?| ?Stage four                           



                          ? ? ? ? | ? Stage four                           



                          ? ? ? ? ?                              



                          ?+--------------------                           



                          ---+------------------                           



                          ---+------------------                           



                          -------+| ?<15 (or                           



                          dialysis) ? ?| ?Stage                           



                          five ? ? ? ? | ? Stage                           



                          five ? ? ? ? ?                           



                          ?+--------------------                           



                          ---+------------------                           



                          ---+------------------                           



                          -------+ *Each stage                           



                          assumes the associated                           



                          GFR level has been in                           



                          effect for at least                           



                          three months. ?Stages                           



                          1 to 5, with or                           



                          without kidney                           



                          disease, indicate                           



                          chronic kidney                           



                          disease. Notes:                           



                          Determination of                           



                          stages one and two                           



                          (with eGFR                             



                          >59mL/min/1.73 m2)                           



                          requires estimation of                           



                          kidney damage for at                           



                          least three months as                           



                          defined by structural                           



                          or functional                           



                          abnormalities of the                           



                          kidney, manifested by                           



                          either:Pathological                           



                          abnormalities or                           



                          Markers of kidney                           



                          damage (including                           



                          abnormalities in the                           



                          composition of the                           



                          blood or urine or                           



                          abnormalities in                           



                          imaging tests).                           

 

             Lab Interpretation Abnormal                               



             (test code = 56573-8)                                        



[facility] METABOLIC PANEL (NA, K, CL, CO2, 
GLUCOSE, BUN, CREATININE, CA)2022 04:46:12





             Test Item    Value        Reference Range Interpretation Comments

 

             NA (test code = 136 mmol/L   135-145                   



             7421769296)                                         

 

             K (test code = 5.1 mmol/L   3.5-5        H            



             6212775193)                                         

 

             CL (test code = 106 mmol/L                       



             3862579518)                                         

 

             CO2 TOTAL (test code = 9 mmol/L     23-31        L            



             1678413192)                                         

 

             AGAP (test code =              2-16         H            



             8628885595)                                         

 

             BUN (test code = 11 mg/dL     7-23                      



             2351386926)                                         

 

             GLUCOSE (test code = 405 mg/dL           H            



             3660549774)                                         

 

             CREATININE (test code = 0.62 mg/dL   0.6-1.25                  



             8316650022)                                         

 

             CALCIUM (test code = 9.5 mg/dL    8.6-10.6                  



             4297062012)                                         

 

             eGFR (test code =              mL/min/1.73m2              



             1463799968)                                         

 

             MAL (test code = MAL) Association of                           



                          Glomerular Filtration                           



                          Rate (GFR) and Staging                           



                          of Kidney Disease*                           



                          +---------------------                           



                          --+-------------------                           



                          --+-------------------                           



                          ------+| GFR                           



                          (mL/min/1.73 m2) ?|                           



                          With Kidney Damage ?|                           



                          ?Without Kidney                           



                          Damage+---------------                           



                          --------+-------------                           



                          --------+-------------                           



                          ------------+| ?>90 ?                           



                          ? ? ? ? ? ? ? ?|                           



                          ?Stage one ? ? ? ? ?|                           



                          ? Normal ? ? ? ? ? ? ?                           



                          ?+--------------------                           



                          ---+------------------                           



                          ---+------------------                           



                          -------+| ?60-89 ? ? ?                           



                          ? ? ? ? ?| ?Stage two                           



                          ? ? ? ? ?| ? Decreased                           



                          GFR ? ? ? ?                            



                          +---------------------                           



                          --+-------------------                           



                          --+-------------------                           



                          ------+| ?30-59 ? ? ?                           



                          ? ? ? ? ?| ?Stage                           



                          three ? ? ? ?| ? Stage                           



                          three ? ? ? ? ?                           



                          +---------------------                           



                          --+-------------------                           



                          --+-------------------                           



                          ------+| ?15-29 ? ? ?                           



                          ? ? ? ? ?| ?Stage four                           



                          ? ? ? ? | ? Stage four                           



                          ? ? ? ? ?                              



                          ?+--------------------                           



                          ---+------------------                           



                          ---+------------------                           



                          -------+| ?<15 (or                           



                          dialysis) ? ?| ?Stage                           



                          five ? ? ? ? | ? Stage                           



                          five ? ? ? ? ?                           



                          ?+--------------------                           



                          ---+------------------                           



                          ---+------------------                           



                          -------+ *Each stage                           



                          assumes the associated                           



                          GFR level has been in                           



                          effect for at least                           



                          three months. ?Stages                           



                          1 to 5, with or                           



                          without kidney                           



                          disease, indicate                           



                          chronic kidney                           



                          disease. Notes:                           



                          Determination of                           



                          stages one and two                           



                          (with eGFR                             



                          >59mL/min/1.73 m2)                           



                          requires estimation of                           



                          kidney damage for at                           



                          least three months as                           



                          defined by structural                           



                          or functional                           



                          abnormalities of the                           



                          kidney, manifested by                           



                          either:Pathological                           



                          abnormalities or                           



                          Markers of kidney                           



                          damage (including                           



                          abnormalities in the                           



                          composition of the                           



                          blood or urine or                           



                          abnormalities in                           



                          imaging tests).                           

 

             Lab Interpretation Abnormal                               



             (test code = 76487-3)                                        



[id number]:36





             Test Item    Value        Reference Range Interpretation Comments

 

             PROTIME PATIENT (test              See_Comment                [Auto

mated message]



             code = 5964-2)                                        The system [facility]



                                                                 generated this 

result



                                                                 transmitted ref

erence



                                                                 range: 12.0 - 1

4.7



                                                                 Seconds. The re

ference



                                                                 range was not u

sed to



                                                                 interpret this 

result



                                                                 as normal/abnor

mal.

 

             INR (test code = 6301-6)                                        Nor

mal INR <1.1;



                                                                 Warfarin Therap

eutic



                                                                 range 2.0 to 3.

0 or



                                                                 2.5 to 3.5, dep

ending



                                                                 upon the indica

tions.

 

             Lab Interpretation (test Normal                                 



             code = 55313-4)                                        



[facility] [id number] 04:43:36





             Test Item    Value        Reference Range Interpretation Comments

 

             PROTIME PATIENT (test              See_Comment                [Auto

mated message]



             code = 5964-2)                                        The system 

[employer]



                                                                 generated this 

result



                                                                 transmitted ref

erence



                                                                 range: 12.0 - 1

4.7



                                                                 Seconds. The re

ference



                                                                 range was not u

sed to



                                                                 interpret this 

result



                                                                 as normal/abnor

mal.

 

             INR (test code = 6301-6)                                        Nor

mal INR <1.1;



                                                                 Warfarin Therap

eutic



                                                                 range 2.0 to 3.

0 or



                                                                 2.5 to 3.5, dep

ending



                                                                 upon the indica

tions.

 

             Lab Interpretation (test Normal                                 



             code = 40870-2)                                        



[facility] [id number] 04:43:36





             Test Item    Value        Reference Range Interpretation Comments

 

             PROTIME PATIENT (test              See_Comment                [Auto

mated message]



             code = 5964-2)                                        The system 

[employer]



                                                                 generated this 

result



                                                                 transmitted ref

erence



                                                                 range: 12.0 - 1

4.7



                                                                 Seconds. The re

ference



                                                                 range was not u

sed to



                                                                 interpret this 

result



                                                                 as normal/abnor

mal.

 

             INR (test code = 6301-6)                                        Nor

mal INR <1.1;



                                                                 Warfarin Therap

eutic



                                                                 range 2.0 to 3.

0 or



                                                                 2.5 to 3.5, dep

ending



                                                                 upon the indica

tions.

 

             Lab Interpretation (test Normal                                 



             code = 09587-0)                                        



[facility] (ALB,T.PRO,BILI
T,BU/BC,ALT,AST,ALK PHOS)2022 04:40:15





             Test Item    Value        Reference Range Interpretation Comments

 

             TOTAL BILI (test code = 7168340115) 0.9 mg/dL    0.1-1.1           

        

 

             BILI UNCON (test code = 3350575011) 0.3 mg/dL    0.1-1.1           

        

 

             BILI CONJ (test code = 1069457237) 0.0 mg/dL    0-0.3              

       

 

             T PROTEIN (test code = 3798055383) 7.5 g/dL     6.3-8.2            

       

 

             ALBUMIN (test code = 2762492036) 4.0 g/dL     3.5-5                

     

 

             ALK PHOS (test code = 0582771759) 166 U/L             H      

      

 

             ALTv (test code = 1742-6) 28 U/L       5-50                      

 

             AST(SGOT) (test code = 5170082460) 22 U/L       13-40              

       

 

             Lab Interpretation (test code = Abnormal                           

    



             68094-4)                                            



[facility] (ALB,T.PRO,BILI
T,BU/BC,ALT,AST,ALK PHOS)2022 04:40:15





             Test Item    Value        Reference Range Interpretation Comments

 

             TOTAL BILI (test code = 1223134105) 0.9 mg/dL    0.1-1.1           

        

 

             BILI UNCON (test code = 7940408541) 0.3 mg/dL    0.1-1.1           

        

 

             BILI CONJ (test code = 7611845972) 0.0 mg/dL    0-0.3              

       

 

             T PROTEIN (test code = 0090070768) 7.5 g/dL     6.3-8.2            

       

 

             ALBUMIN (test code = 6171197513) 4.0 g/dL     3.5-5                

     

 

             ALK PHOS (test code = 1904626393) 166 U/L             H      

      

 

             ALTv (test code = 1742-6) 28 U/L       5-50                      

 

             AST(SGOT) (test code = 7640351406) 22 U/L       13-40              

       

 

             Lab Interpretation (test code = Abnormal                           

    



             00302-4)                                            



[facility]74[facility] (ALB,T.PRO,BILI
T,BU/BC,ALT,AST,ALK PHOS)2022 04:40:15





             Test Item    Value        Reference Range Interpretation Comments

 

             TOTAL BILI (test code = 3453345219) 0.9 mg/dL    0.1-1.1           

        

 

             BILI UNCON (test code = 4194125204) 0.3 mg/dL    0.1-1.1           

        

 

             BILI CONJ (test code = 0665504250) 0.0 mg/dL    0-0.3              

       

 

             T PROTEIN (test code = 8512254287) 7.5 g/dL     6.3-8.2            

       

 

             ALBUMIN (test code = 5606457028) 4.0 g/dL     3.5-5                

     

 

             ALK PHOS (test code = 1466442752) 166 U/L             H      

      

 

             ALTv (test code = 1742-6) 28 U/L       5-50                      

 

             AST(SGOT) (test code = 6418764687) 22 U/L       13-40              

       

 

             Lab Interpretation (test code = Abnormal                           

    



             18003-0)                                            



[facility]





             Test Item    Value        Reference Range Interpretation Comments

 

             LIPASE (test code = 3677173118) 239 U/L      0-220        H        

    

 

             Lab Interpretation (test code = Abnormal                           

    



             12751-7)                                            



[facility]





             Test Item    Value        Reference Range Interpretation Comments

 

             LIPASE (test code = 2932678194) 239 U/L      0-220        H        

    

 

             Lab Interpretation (test code = Abnormal                           

    



             33456-3)                                            



[facility]





             Test Item    Value        Reference Range Interpretation Comments

 

             LIPASE (test code = 6892022181) 239 U/L      0-220        H        

    

 

             Lab Interpretation (test code = Abnormal                           

    



             80328-1)                                            



[facility] [id number] 02:01:26





             Test Item    Value        Reference Range Interpretation Comments

 

             Blood Culture-Aerobic No organisms No growth                 Previo

us



             (test code = 17928-3) isolated                               prelim

inary



                                                                 verified result



                                                                 was Culture In



                                                                 Progress on



                                                                 2022 at 00

02



                                                                 CDTPrevious



                                                                 preliminary



                                                                 verified result



                                                                 was No growth a

t



                                                                 24 hours on



                                                                 2022 at 21

01



                                                                 CDTPrevious



                                                                 preliminary



                                                                 verified result



                                                                 was No growth a

t



                                                                 48 hours on



                                                                 2022 at 21

01



                                                                 CDTPrevious



                                                                 preliminary



                                                                 verified result



                                                                 was No growth a

t



                                                                 72 hours on



                                                                 2022 at 21

01



                                                                 CDT

 

             Blood        No organisms No growth                 Previous



             Culture-Anaerobic isolated                               preliminar

y



             (test code = 83519-4)                                        verifi

ed result



                                                                 was Culture In



                                                                 Progress on



                                                                 2022 at 00

02



                                                                 CDTPrevious



                                                                 preliminary



                                                                 verified result



                                                                 was No growth a

t



                                                                 24 hours on



                                                                 2022 at 21

01



                                                                 CDTPrevious



                                                                 preliminary



                                                                 verified result



                                                                 was No growth a

t



                                                                 48 hours on



                                                                 2022 at 21

01



                                                                 CDTPrevious



                                                                 preliminary



                                                                 verified result



                                                                 was No growth a

t



                                                                 72 hours on



                                                                 2022 at 21

01



                                                                 CDT

 

             Lab Interpretation Normal                                 



             (test code = 12501-8)                                        



[facility]2022 19:39:16





             Test Item    Value        Reference Range Interpretation Comments

 

             POCT GLU (test code = 2823044531) 153 mg/dL           H      

      

 

             Lab Interpretation (test code = Abnormal                           

    



             13059-3)                                            



[facility] (AUTOM[facility])2022 16:42:59





             Test Item    Value        Reference Range Interpretation Comments

 

             POCT GLU (test code = 1958026946) 311 mg/dL           H      

      

 

             Lab Interpretation (test code = Abnormal                           

    



             95554-3)                                            



[facility]SE (AUTOMATED)2022 01:48:17





             Test Item    Value        Reference Range Interpretation Comments

 

             POCT GLU (test code = 0505320154) 253 mg/dL           H      

      

 

             Lab Interpretation (test code = Abnormal                           

    



             85527-9)                                            



[facility] GLU[facility]SE (AUTOMATED)2022 21:48:07





             Test Item    Value        Reference Range Interpretation Comments

 

             POCT GLU (test code = 5770753728) 279 mg/dL           H      

      

 

             Lab Interpretation (test code = Abnormal                           

    



             66951-9)                                            



[facility] GLUCOSE (AUTOMATED)2022 16:48:09





             Test Item    Value        Reference Range Interpretation Comments

 

             POCT GLU (test code = 9727962504) 275 mg/dL           H      

      

 

             Lab Interpretation (test code = Abnormal                           

    



             46243-5)                                            



[facility] METABOLIC PANEL (NA, K, CL, CO2, 
GLUCOSE, BUN, CREATININE, CA)2022 15:41:54





             Test Item    Value        Reference Range Interpretation Comments

 

             NA (test code = 148 mmol/L   135-145      H            



             2978925707)                                         

 

             K (test code = 4.3 mmol/L   3.5-5                     



             7932827576)                                         

 

             CL (test code = 123 mmol/L          H            



             5074476288)                                         

 

             CO2 TOTAL (test code = 19 mmol/L    23-31        L            



             3438102200)                                         

 

             AGAP (test code =              2-16                      



             4090030644)                                         

 

             BUN (test code = 23 mg/dL     7-23                      



             9462122218)                                         

 

             GLUCOSE (test code = 305 mg/dL           H            



             0530213135)                                         

 

             CREATININE (test code = 0.61 mg/dL   0.6-1.25                  



             7811223771)                                         

 

             CALCIUM (test code = 8.5 mg/dL    8.6-10.6     L            



             4510860580)                                         

 

             eGFR (test code =              mL/min/1.73m2              



             2345464866)                                         

 

             MAL (test code = MAL) Association of                           



                          Glomerular Filtration                           



                          Rate (GFR) and Staging                           



                          of Kidney Disease*                           



                          +---------------------                           



                          --+-------------------                           



                          --+-------------------                           



                          ------+| GFR                           



                          (mL/min/1.73 m2) ?|                           



                          With Kidney Damage ?|                           



                          ?Without Kidney                           



                          Damage+---------------                           



                          --------+-------------                           



                          --------+-------------                           



                          ------------+| ?>90 ?                           



                          ? ? ? ? ? ? ? ?|                           



                          ?Stage one ? ? ? ? ?|                           



                          ? Normal ? ? ? ? ? ? ?                           



                          ?+--------------------                           



                          ---+------------------                           



                          ---+------------------                           



                          -------+| ?60-89 ? ? ?                           



                          ? ? ? ? ?| ?Stage two                           



                          ? ? ? ? ?| ? Decreased                           



                          GFR ? ? ? ?                            



                          +---------------------                           



                          --+-------------------                           



                          --+-------------------                           



                          ------+| ?30-59 ? ? ?                           



                          ? ? ? ? ?| ?Stage                           



                          three ? ? ? ?| ? Stage                           



                          three ? ? ? ? ?                           



                          +---------------------                           



                          --+-------------------                           



                          --+-------------------                           



                          ------+| ?15-29 ? ? ?                           



                          ? ? ? ? ?| ?Stage four                           



                          ? ? ? ? | ? Stage four                           



                          ? ? ? ? ?                              



                          ?+--------------------                           



                          ---+------------------                           



                          ---+------------------                           



                          -------+| ?<15 (or                           



                          dialysis) ? ?| ?Stage                           



                          five ? ? ? ? | ? Stage                           



                          five ? ? ? ? ?                           



                          ?+--------------------                           



                          ---+------------------                           



                          ---+------------------                           



                          -------+ *Each stage                           



                          assumes the associated                           



                          GFR level has been in                           



                          effect for at least                           



                          three months. ?Stages                           



                          1 to 5, with or                           



                          without kidney                           



                          disease, indicate                           



                          chronic kidney                           



                          disease. Notes:                           



                          Determination of                           



                          stages one and two                           



                          (with eGFR                             



                          >59mL/min/1.73 m2)                           



                          requires estimation of                           



                          kidney damage for at                           



                          least three months as                           



                          defined by structural                           



                          or functional                           



                          abnormalities of the                           



                          kidney, manifested by                           



                          either:Pathological                           



                          abnormalities or                           



                          Markers of kidney                           



                          damage (including                           



                          abnormalities in the                           



                          composition of the                           



                          blood or urine or                           



                          abnormalities in                           



                          imaging tests).                           

 

             Lab Interpretation Abnormal                               



             (test code = 16010-6)                                        



[facility]





             Test Item    Value        Reference Range Interpretation Comments

 

             MAGNESIUM (test code = 1586483618) 1.8 mg/dL    1.7-2.4            

       

 

             Lab Interpretation (test code = Normal                             

    



             10393-3)                                            



[facility]





             Test Item    Value        Reference Range Interpretation Comments

 

             PHOSPHORUS (test code = 2922204151) 2.2 mg/dL    2.5-5        L    

        

 

             Lab Interpretation (test code = Abnormal                           

    



             29215-1)                                            



[facility]2022 12:48:51





             Test Item    Value        Reference Range Interpretation Comments

 

             POCT GLU (test code = 2446817687) 274 mg/dL           H      

      

 

             Lab Interpretation (test code = Abnormal                           

    



             42488-8)                                            



[facility])2022 01:10:30





             Test Item    Value        Reference Range Interpretation Comments

 

             POCT GLU (test code = 8444310875) 248 mg/dL           H      

      

 

             Lab Interpretation (test code = Abnormal                           

    



             73276-1)                                            



[facility])2022 01:10:30





             Test Item    Value        Reference Range Interpretation Comments

 

             POCT GLU (test code = 4663493468) 300 mg/dL           H      

      

 

             Lab Interpretation (test code = Abnormal                           

    



             47572-5)                                            



[facility] (AUTOM[facility])2022 16:52:44





             Test Item    Value        Reference Range Interpretation Comments

 

             POCT GLU (test code = 6975194255) 296 mg/dL           H      

      

 

             Lab Interpretation (test code = Abnormal                           

    



             97311-6)                                            



[facility]SE (AUTOMATED)2022 16:52:43





             Test Item    Value        Reference Range Interpretation Comments

 

             POCT GLU (test code = 7553534255) 345 mg/dL           H      

      

 

             Lab Interpretation (test code = Abnormal                           

    



             19394-7)                                            



[facility] (AUTOM[facility])2022 16:52:38





             Test Item    Value        Reference Range Interpretation Comments

 

             POCT GLU (test code = 2955508248) 363 mg/dL           H      

      

 

             Lab Interpretation (test code = Abnormal                           

    



             71755-2)                                            



[facility]SE (AUTOMATED)2022 16:52:38





             Test Item    Value        Reference Range Interpretation Comments

 

             POCT GLU (test code = 2476257301) 444 mg/dL           H      

      

 

             Lab Interpretation (test code = Abnormal                           

    



             96864-1)                                            



[facility]UCOSE (AUTOMATED)2022 16:52:38





             Test Item    Value        Reference Range Interpretation Comments

 

             POCT GLU (test code = 8524695385) 324 mg/dL           H      

      

 

             Lab Interpretation (test code = Abnormal                           

    



             14076-9)                                            



[facility] (AUTOMATED)2022 16:52:38





             Test Item    Value        Reference Range Interpretation Comments

 

             POCT GLU (test code = 2574924255) 303 mg/dL           H      

      

 

             Lab Interpretation (test code = Abnormal                           

    



             67787-0)                                            



[facility]SE (AUTOMATED)2022 16:52:38





             Test Item    Value        Reference Range Interpretation Comments

 

             POCT GLU (test code = 7701993278) 288 mg/dL           H      

      

 

             Lab Interpretation (test code = Abnormal                           

    



             77596-0)                                            



[facility] GLUCOSE (AUTOMATED)2022 16:37:12





             Test Item    Value        Reference Range Interpretation Comments

 

             POCT GLU (test code = 1943541939) 241 mg/dL           H      

      

 

             Lab Interpretation (test code = Abnormal                           

    



             52246-5)                                            



[facility] GLUCOSE (AUTOMATED)2022 13:14:11





             Test Item    Value        Reference Range Interpretation Comments

 

             POCT GLU (test code = 6747840430) 264 mg/dL           H      

      

 

             Lab Interpretation (test code = Abnormal                           

    



             22678-8)                                            



[facility] GLUCOSE (AUTOMATED)2022 11:04:52





             Test Item    Value        Reference Range Interpretation Comments

 

             POCT GLU (test code = 2830878837) 257 mg/dL           H      

      

 

             Lab Interpretation (test code = Abnormal                           

    



             74410-3)                                            



[facility] GLUCOSE (AUTOMATED)2022 07:51:27





             Test Item    Value        Reference Range Interpretation Comments

 

             POCT GLU (test code = 1735705909) 228 mg/dL           H      

      

 

             Lab Interpretation (test code = Abnormal                           

    



             18608-6)                                            



[facility] GLUCOSE (AUTOMATED)2022 03:43:14





             Test Item    Value        Reference Range Interpretation Comments

 

             POCT GLU (test code = 8043346864) 269 mg/dL           H      

      

 

             Lab Interpretation (test code = Abnormal                           

    



             31232-9)                                            



[facility] GLUCOSE (AUTOMATED)2022 00:53:27





             Test Item    Value        Reference Range Interpretation Comments

 

             POCT GLU (test code = 8553679125) 342 mg/dL           H      

      

 

             Lab Interpretation (test code = Abnormal                           

    



             88465-6)                                            



[facility] Metabolic Panel (Na, K, Cl, CO2, 
Glucose, BUN, Creatinine, Ca)2022 00:26:35





             Test Item    Value        Reference Range Interpretation Comments

 

             NA (test code = 161 mmol/L   135-145      HH           



             0356881777)                                         

 

             K (test code = 5.3 mmol/L   3.5-5        H            



             4883293677)                                         

 

             CL (test code = 131 mmol/L          H            



             4775235662)                                         

 

             CO2 TOTAL (test code = 14 mmol/L    23-31        L            



             5745988668)                                         

 

             AGAP (test code =              2-16                      



             5578050621)                                         

 

             BUN (test code = 40 mg/dL     7-23         H            



             0924181379)                                         

 

             GLUCOSE (test code = 363 mg/dL           H            



             7561688894)                                         

 

             CREATININE (test code = 1.31 mg/dL   0.6-1.25     H            



             9185567705)                                         

 

             CALCIUM (test code = 9.0 mg/dL    8.6-10.6                  



             2910517555)                                         

 

             eGFR (test code =              mL/min/1.73m2              



             6545350813)                                         

 

             MAL (test code = MAL) Association of                           



                          Glomerular Filtration                           



                          Rate (GFR) and Staging                           



                          of Kidney Disease*                           



                          +---------------------                           



                          --+-------------------                           



                          --+-------------------                           



                          ------+| GFR                           



                          (mL/min/1.73 m2) ?|                           



                          With Kidney Damage ?|                           



                          ?Without Kidney                           



                          Damage+---------------                           



                          --------+-------------                           



                          --------+-------------                           



                          ------------+| ?>90 ?                           



                          ? ? ? ? ? ? ? ?|                           



                          ?Stage one ? ? ? ? ?|                           



                          ? Normal ? ? ? ? ? ? ?                           



                          ?+--------------------                           



                          ---+------------------                           



                          ---+------------------                           



                          -------+| ?60-89 ? ? ?                           



                          ? ? ? ? ?| ?Stage two                           



                          ? ? ? ? ?| ? Decreased                           



                          GFR ? ? ? ?                            



                          +---------------------                           



                          --+-------------------                           



                          --+-------------------                           



                          ------+| ?30-59 ? ? ?                           



                          ? ? ? ? ?| ?Stage                           



                          three ? ? ? ?| ? Stage                           



                          three ? ? ? ? ?                           



                          +---------------------                           



                          --+-------------------                           



                          --+-------------------                           



                          ------+| ?15-29 ? ? ?                           



                          ? ? ? ? ?| ?Stage four                           



                          ? ? ? ? | ? Stage four                           



                          ? ? ? ? ?                              



                          ?+--------------------                           



                          ---+------------------                           



                          ---+------------------                           



                          -------+| ?<15 (or                           



                          dialysis) ? ?| ?Stage                           



                          five ? ? ? ? | ? Stage                           



                          five ? ? ? ? ?                           



                          ?+--------------------                           



                          ---+------------------                           



                          ---+------------------                           



                          -------+ *Each stage                           



                          assumes the associated                           



                          GFR level has been in                           



                          effect for at least                           



                          three months. ?Stages                           



                          1 to 5, with or                           



                          without kidney                           



                          disease, indicate                           



                          chronic kidney                           



                          disease. Notes:                           



                          Determination of                           



                          stages one and two                           



                          (with eGFR                             



                          >59mL/min/1.73 m2)                           



                          requires estimation of                           



                          kidney damage for at                           



                          least three months as                           



                          defined by structural                           



                          or functional                           



                          abnormalities of the                           



                          kidney, manifested by                           



                          either:Pathological                           



                          abnormalities or                           



                          Markers of kidney                           



                          damage (including                           



                          abnormalities in the                           



                          composition of the                           



                          blood or urine or                           



                          abnormalities in                           



                          imaging tests).                           

 

             Lab Interpretation Abnormal                               



             (test code = 13948-4)                                        



[facility]SE (AUTOMATED)2022 22:31:31





             Test Item    Value        Reference Range Interpretation Comments

 

             POCT GLU (test code = 3521511483) 349 mg/dL           H      

      

 

             Lab Interpretation (test code = Abnormal                           

    



             59135-2)                                            



[facility] GLUCOSE (AUTOMATED)2022 20:57:05





             Test Item    Value        Reference Range Interpretation Comments

 

             POCT GLU (test code = 8741999372)                     HH     

      

 

             Lab Interpretation (test code = Abnormal                           

    



             62956-6)                                            



[facility] GLUCOSE (AUTOMATED)2022 20:57:00





             Test Item    Value        Reference Range Interpretation Comments

 

             POCT GLU (test code = 8932046948)                     HH     

      

 

             Lab Interpretation (test code = Abnormal                           

    



             81061-5)                                            



[facility] GLUCOSE (AUTOMATED)2022 20:57:00





             Test Item    Value        Reference Range Interpretation Comments

 

             POCT GLU (test code = 1491404976)                     HH     

      

 

             Lab Interpretation (test code = Abnormal                           

    



             96405-3)                                            



[facility] GLUCOSE (AUTOMATED)2022 20:57:00





             Test Item    Value        Reference Range Interpretation Comments

 

             POCT GLU (test code = 4058196254)                     HH     

      

 

             Lab Interpretation (test code = Abnormal                           

    



             35126-6)                                            



[id number] 12:58:42





             Test Item    Value        Reference Range Interpretation Comments

 

             LACTIC ACID (test code = 4.32 mmol/L  0.5-2.2      H            



             0347956518)                                         

 

             Lab Interpretation (test code = Abnormal                           

    



             42667-2)                                            



[facility] [id number] 03:51:48





             Test Item    Value        Reference Range Interpretation Comments

 

             PHOSPHORUS (test code = 6.7 mg/dL    2.5-5        H            Slig

ht hemolysis



             1328187265)                                         

 

             Lab Interpretation (test Abnormal                               



             code = 20908-1)                                        



[facility] [id number] 03:51:48





             Test Item    Value        Reference Range Interpretation Comments

 

             MAGNESIUM (test code = 9539494209) 2.8 mg/dL    1.7-2.4      H     

       

 

             Lab Interpretation (test code = Abnormal                           

    



             33081-0)                                            



[facility]61025[facility] 
02:16:52





             Test Item    Value        Reference Range Interpretation Comments

 

             NA (test code = 166 mmol/L   135-145      HH           



             5923455700)                                         

 

             K (test code = 5.2 mmol/L   3.5-5        H            



             2380580322)                                         

 

             CL (test code = 123 mmol/L          H            



             0276728041)                                         

 

             CO2 TOTAL (test code = 12 mmol/L    23-31        L            



             3399753983)                                         

 

             AGAP (test code =              2-16         H            



             7634087127)                                         

 

             BUN (test code = 35 mg/dL     7-23         H            



             6610494136)                                         

 

             GLUCOSE (test code = 941 mg/dL           HH           



             5073407412)                                         

 

             CREATININE (test code = 1.51 mg/dL   0.6-1.25     H            



             1623819347)                                         

 

             TOTAL BILI (test code = 1.1 mg/dL    0.1-1.1                   



             3193538898)                                         

 

             CALCIUM (test code = 10.7 mg/dL   8.6-10.6     H            



             8494441815)                                         

 

             T PROTEIN (test code = 8.2 g/dL     6.3-8.2                   



             6933360872)                                         

 

             ALBUMIN (test code = 4.5 g/dL     3.5-5                     



             3512340128)                                         

 

             ALK PHOS (test code = 201 U/L             H            



             2493210669)                                         

 

             ALTv (test code = 43 U/L       5-50                      



             1742-6)                                             

 

             AST(SGOT) (test code = 27 U/L       13-40                     



             3468154593)                                         

 

             eGFR (test code =              mL/min/1.73m2              



             3072216046)                                         

 

             MAL (test code = MAL) Association of                           



                          Glomerular Filtration                           



                          Rate (GFR) and Staging                           



                          of Kidney Disease*                           



                          +---------------------                           



                          --+-------------------                           



                          --+-------------------                           



                          ------+| GFR                           



                          (mL/min/1.73 m2) ?|                           



                          With Kidney Damage ?|                           



                          ?Without Kidney                           



                          Damage+---------------                           



                          --------+-------------                           



                          --------+-------------                           



                          ------------+| ?>90 ?                           



                          ? ? ? ? ? ? ? ?|                           



                          ?Stage one ? ? ? ? ?|                           



                          ? Normal ? ? ? ? ? ? ?                           



                          ?+--------------------                           



                          ---+------------------                           



                          ---+------------------                           



                          -------+| ?60-89 ? ? ?                           



                          ? ? ? ? ?| ?Stage two                           



                          ? ? ? ? ?| ? Decreased                           



                          GFR ? ? ? ?                            



                          +---------------------                           



                          --+-------------------                           



                          --+-------------------                           



                          ------+| ?30-59 ? ? ?                           



                          ? ? ? ? ?| ?Stage                           



                          three ? ? ? ?| ? Stage                           



                          three ? ? ? ? ?                           



                          +---------------------                           



                          --+-------------------                           



                          --+-------------------                           



                          ------+| ?15-29 ? ? ?                           



                          ? ? ? ? ?| ?Stage four                           



                          ? ? ? ? | ? Stage four                           



                          ? ? ? ? ?                              



                          ?+--------------------                           



                          ---+------------------                           



                          ---+------------------                           



                          -------+| ?<15 (or                           



                          dialysis) ? ?| ?Stage                           



                          five ? ? ? ? | ? Stage                           



                          five ? ? ? ? ?                           



                          ?+--------------------                           



                          ---+------------------                           



                          ---+------------------                           



                          -------+ *Each stage                           



                          assumes the associated                           



                          GFR level has been in                           



                          effect for at least                           



                          three months. ?Stages                           



                          1 to 5, with or                           



                          without kidney                           



                          disease, indicate                           



                          chronic kidney                           



                          disease. Notes:                           



                          Determination of                           



                          stages one and two                           



                          (with eGFR                             



                          >59mL/min/1.73 m2)                           



                          requires estimation of                           



                          kidney damage for at                           



                          least three months as                           



                          defined by structural                           



                          or functional                           



                          abnormalities of the                           



                          kidney, manifested by                           



                          either:Pathological                           



                          abnormalities or                           



                          Markers of kidney                           



                          damage (including                           



                          abnormalities in the                           



                          composition of the                           



                          blood or urine or                           



                          abnormalities in                           



                          imaging tests).                           

 

             Lab Interpretation Abnormal                               



             (test code = 02531-9)                                        



[id number]





             Test Item    Value        Reference    Interpretation Comments



                                       Range                     

 

             TROPONIN I (test 0.005 ng/mL  See_Comment                [Automated



             code = 6466081139)                                        message] 

The



                                                                 system which



                                                                 generated this



                                                                 result



                                                                 transmitted



                                                                 reference range

:



                                                                 <=0.034. The



                                                                 reference range



                                                                 was not used to



                                                                 interpret this



                                                                 result as



                                                                 normal/abnormal

.

 

             MAL (test code = Reference (Normal)                           



             MAL)         Range (defined by                           



                          the 99th percentile                           



                          reference limit): <=                           



                          0.034 ng/mL Note:                           



                          Cardiac troponin                           



                          begins to rise 3-4                           



                          hours after the                           



                          onset of ischemia.                           



                          Repeat in 4-6 hours                           



                          if the sample was                           



                          drawn within 3-4                           



                          hours of the onset                           



                          of the symptom and                           



                          found normal.                           



                          Diagnosis of                           



                          myocardial injury is                           



                          made with acute                           



                          changes in cTn                           



                          concentrations with                           



                          at least one serial                           



                          sample above the                           



                          99th percentile                           



                          upper reference                           



                          limit (URL), taken                           



                          together with the                           



                          patient's clinical                           



                          presentation. Biotin                           



                          has been reported to                           



                          cause a negative                           



                          bias, interpret                           



                          results relative to                           



                          patient's use of                           



                          biotin.                                

 

             Lab Interpretation Normal                                 



             (test code =                                        



             33616-5)                                            



[facility]
SALICYLATE&lt;10mg/ 9:06 PM [facility] 
LABORATORYTherapeutic Range: ? ?? ? ? Analgesic and Antipyretic Use ? ? ? ? 20-
100 mg/L ? ? Anti-Inflammatory Use ? ? ? ? ? ? ? ? 100-250 mg/L Toxic Range: ? ?
? ? ? ? ? ? ? ? ? ? ? ? ? Greater than 300 mg/LUnTexas [facility]ICYLATE[facility] 02:06:11SALICYLATE&lt;10mg/ 9:06 PM 
[facility] LABORATORYTherapeutic Range: ? ?? ? ? Analgesic and
Antipyretic Use ? ? ? ?  mg/L ? ? Anti-Inflammatory Use ? ? ? ? ? ? ? ? 
100-250 mg/L Toxic Range: ? ? ? ? ? ? ? ? ? ? ? ? ? ? ? Greater than 300 mg/L
[facility]2022-07-27 02:05:51
ALCOHOL&lt;10mg/dL2022 9:05 PM [facility] 
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
[facility]ortedUnTexas [facility]fieldETHANOL2022-07-27 02:05:51
ALCOHOL&lt;10mg/dL2022 9:05 PM [facility] 
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
[facility]04





             Test Item    Value        Reference Range Interpretation Comments

 

             ACETAMINOP (test code =              10-30        L            



             3546853738)                                         

 

             MAL (test code = MAL) Toxic: Greater than                          

 



                          200 ug/mL @ 4 hour                           



                          post ingestion or                           



                          greater than 50                           



                          ug/mL @ 12 hour post                           



                          ingestion                              

 

             Lab Interpretation (test Abnormal                               



             code = 04044-1)                                        



[facility]:04





             Test Item    Value        Reference Range Interpretation Comments

 

             ACETAMINOP (test code =              10-30        L            



             1114091056)                                         

 

             MAL (test code = MAL) Toxic: Greater than                          

 



                          200 ug/mL @ 4 hour                           



                          post ingestion or                           



                          greater than 50                           



                          ug/mL @ 12 hour post                           



                          ingestion                              

 

             Lab Interpretation (test Abnormal                               



             code = 88244-4)                                        



[facility]





             Test Item    Value        Reference Range Interpretation Comments

 

             LIPASE (test code = 7635397915) 246 U/L      0-220        H        

    

 

             Lab Interpretation (test code = Abnormal                           

    



             81039-1)                                            



[facility] WITH [id number] 01:12:41





             Test Item    Value        Reference Range Interpretation Comments

 

             WBC (test code =              See_Comment  H             [Automated



             2194-2)                                             message] The



                                                                 system which



                                                                 generated this



                                                                 result transmit

huma



                                                                 reference range

:



                                                                 4.20 - 10.70



                                                                 10*3/?L. The



                                                                 reference range



                                                                 was not used to



                                                                 interpret this



                                                                 result as



                                                                 normal/abnormal

.

 

             RBC (test code =              See_Comment  H             [Automated



             789-8)                                              message] The



                                                                 system which



                                                                 generated this



                                                                 result transmit

huma



                                                                 reference range

:



                                                                 4.26 - 5.52



                                                                 10*6/?L. The



                                                                 reference range



                                                                 was not used to



                                                                 interpret this



                                                                 result as



                                                                 normal/abnormal

.

 

             HGB (test code = 18.3 g/dL    12.2-16.4    H            



             718-7)                                              

 

             HCT (test code = 57.6 %       38.4-49.3    H            



             4544-3)                                             

 

             MCV (test code = 90.7 fL      81.7-95.6                 



             787-2)                                              

 

             MCH (test code = 28.8 pg      26.1-32.7                 



             785-6)                                              

 

             MCHC (test code = 31.8 g/dL    31.2-35                   



             786-4)                                              

 

             RDW-SD (test code = 50.8 fL      38.5-51.6                 



             07455-8)                                            

 

             RDW-CV (test code = 16.4 %       12.1-15.4    H            



             788-0)                                              

 

             PLT (test code =              See_Comment  H             [Automated



             777-3)                                              message] The



                                                                 system which



                                                                 generated this



                                                                 result transmit

huma



                                                                 reference range

:



                                                                 150 - 328 10*3/

?L.



                                                                 The reference



                                                                 range was not u

sed



                                                                 to interpret th

is



                                                                 result as



                                                                 normal/abnormal

.

 

             MPV (test code = 11.6 fL      9.8-13                    



             02220-6)                                            

 

             NRBC/100 WBC (test              See_Comment                [Automat

ed



             code = 5406824085)                                        message] 

The



                                                                 system which



                                                                 generated this



                                                                 result transmit

huma



                                                                 reference range

:



                                                                 0.0 - 10.0 /100



                                                                 WBCs. The



                                                                 reference range



                                                                 was not used to



                                                                 interpret this



                                                                 result as



                                                                 normal/abnormal

.

 

             NRBC x10^3 (test code              See_Comment                [Auto

mated



             = 1810177875)                                        message] The



                                                                 system which



                                                                 generated this



                                                                 result transmit

huma



                                                                 reference range

:



                                                                 10*3/?L. The



                                                                 reference range



                                                                 was not used to



                                                                 interpret this



                                                                 result as



                                                                 normal/abnormal

.

 

             SEG % (test code = 77 %         33-76        H            



             23175-9)                                            

 

             BAND % (test code = 9 %          0-1          H            



             17570-5)                                            

 

             LYMPH % (test code = 7 %          14-54        L            



             23733-6)                                            

 

             MONO % (test code = 7 %          0-4          H            



             26485-3)                                            

 

             ANC (test code = 15.98 10*3/uL 1.99-6.95    H            



             753-4)                                              

 

             OLENA CELLS (test code 2+           See_Comment  A             [Auto

mated



             = 7790-9)                                           message] The



                                                                 system which



                                                                 generated this



                                                                 result transmit

huma



                                                                 reference range

:



                                                                 (none). The



                                                                 reference range



                                                                 was not used to



                                                                 interpret this



                                                                 result as



                                                                 normal/abnormal

.

 

             Lab Interpretation Abnormal                               



             (test code = 85428-7)                                        



[facility]SE (AUTOMATED)2022 01:30:22





             Test Item    Value        Reference Range Interpretation Comments

 

             POCT GLU (test code = 7198975519) 392 mg/dL           H      

      

 

             Lab Interpretation (test code = Abnormal                           

    



             91077-7)                                            



[facility] acute hospital GLUCOSE(AGE &gt;30DAYS)2022 
01:30:00





             Test Item    Value        Reference Range Interpretation Comments

 

             POCT Glu (age>30days) 392 mg/dL,                      



             (test code = 3342) Singer informed                           

 

             Lab Interpretation (test Normal                                 



             code = 50836-9)                                        



[facility] acute hospital GLUCOSE (AUTOMATED)2022 00:43:22





             Test Item    Value        Reference Range Interpretation Comments

 

             POCT GLU (test code = 6625484944) 430 mg/dL           H      

      

 

             Lab Interpretation (test code = Abnormal                           

    



             06753-3)                                            



[facility] acute hospital GLUCOSE (AUTOMATED)2022-07-15 23:29:00





             Test Item    Value        Reference Range Interpretation Comments

 

             POCT GLU (test code = 7887740264) 493 mg/dL           HH     

      

 

             Lab Interpretation (test code = Abnormal                           

    



             16407-6)                                            



[facility] METABOLIC PANEL (NA, K, CL, CO2, 
GLUCOSE, BUN, CREATININE, CA)2022-07-15 22:56:10





             Test Item    Value        Reference Range Interpretation Comments

 

             NA (test code = 132 mmol/L   135-145      L            



             5859525542)                                         

 

             K (test code = 4.4 mmol/L   3.5-5                     



             5600860419)                                         

 

             CL (test code = 98 mmol/L                        



             9168250341)                                         

 

             CO2 TOTAL (test code = 19 mmol/L    23-31        L            



             9029023054)                                         

 

             AGAP (test code =              2-16                      



             [id number])                                         

 

             BUN (test code = 11 mg/dL     7-23                      



             9556277941)                                         

 

             GLUCOSE (test code = 519 mg/dL           HH           



             [id number])                                         

 

             CREATININE (test code = 0.55 mg/dL   0.6-1.25     L            



             1172966826)                                         

 

             CALCIUM (test code = 9.5 mg/dL    8.6-10.6                  



             4581829716)                                         

 

             eGFR (test code =              mL/min/1.73m2              



             0223674973)                                         

 

             MAL (test code = MAL) Association of                           



                          Glomerular Filtration                           



                          Rate (GFR) and Staging                           



                          of Kidney Disease*                           



                          +---------------------                           



                          --+-------------------                           



                          --+-------------------                           



                          ------+| GFR                           



                          (mL/min/1.73 m2) ?|                           



                          With Kidney Damage ?|                           



                          ?Without Kidney                           



                          Damage+---------------                           



                          --------+-------------                           



                          --------+-------------                           



                          ------------+| ?>90 ?                           



                          ? ? ? ? ? ? ? ?|                           



                          ?Stage one ? ? ? ? ?|                           



                          ? Normal ? ? ? ? ? ? ?                           



                          ?+--------------------                           



                          ---+------------------                           



                          ---+------------------                           



                          -------+| ?60-89 ? ? ?                           



                          ? ? ? ? ?| ?Stage two                           



                          ? ? ? ? ?| ? Decreased                           



                          GFR ? ? ? ?                            



                          +---------------------                           



                          --+-------------------                           



                          --+-------------------                           



                          ------+| ?30-59 ? ? ?                           



                          ? ? ? ? ?| ?Stage                           



                          three ? ? ? ?| ? Stage                           



                          three ? ? ? ? ?                           



                          +---------------------                           



                          --+-------------------                           



                          --+-------------------                           



                          ------+| ?15-29 ? ? ?                           



                          ? ? ? ? ?| ?Stage four                           



                          ? ? ? ? | ? Stage four                           



                          ? ? ? ? ?                              



                          ?+--------------------                           



                          ---+------------------                           



                          ---+------------------                           



                          -------+| ?<15 (or                           



                          dialysis) ? ?| ?Stage                           



                          five ? ? ? ? | ? Stage                           



                          five ? ? ? ? ?                           



                          ?+--------------------                           



                          ---+------------------                           



                          ---+------------------                           



                          -------+ *Each stage                           



                          assumes the associated                           



                          GFR level has been in                           



                          effect for at least                           



                          three months. ?Stages                           



                          1 to 5, with or                           



                          without kidney                           



                          disease, indicate                           



                          chronic kidney                           



                          disease. Notes:                           



                          Determination of                           



                          stages one and two                           



                          (with eGFR                             



                          >59mL/min/1.73 m2)                           



                          requires estimation of                           



                          kidney damage for at                           



                          least three months as                           



                          defined by structural                           



                          or functional                           



                          abnormalities of the                           



                          kidney, manifested by                           



                          either:Pathological                           



                          abnormalities or                           



                          Markers of kidney                           



                          damage (including                           



                          abnormalities in the                           



                          composition of the                           



                          blood or urine or                           



                          abnormalities in                           



                          imaging tests).                           

 

             Lab Interpretation Abnormal                               



             (test code = 59653-1)                                        



[facility] WITH [id number] 22:45:19





             Test Item    Value        Reference Range Interpretation Comments

 

             WBC (test code =              See_Comment                [Automated



             9097-2)                                             message] The sy

stem



                                                                 which generated



                                                                 this result



                                                                 transmitted



                                                                 reference range

:



                                                                 4.20 - 10.70



                                                                 10*3/?L. The



                                                                 reference range

 was



                                                                 not used to



                                                                 interpret this



                                                                 result as



                                                                 normal/abnormal

.

 

             RBC (test code =              See_Comment                [Automated



             624-8)                                              message] The sy

stem



                                                                 which generated



                                                                 this result



                                                                 transmitted



                                                                 reference range

:



                                                                 4.26 - 5.52



                                                                 10*6/?L. The



                                                                 reference range

 was



                                                                 not used to



                                                                 interpret this



                                                                 result as



                                                                 normal/abnormal

.

 

             HGB (test code = 15.7 g/dL    12.2-16.4                 



             718-7)                                              

 

             HCT (test code = 46.0 %       38.4-49.3                 



             4544-3)                                             

 

             MCV (test code = 85.0 fL      81.7-95.6                 



             787-2)                                              

 

             MCH (test code = 29.0 pg      26.1-32.7                 



             785-6)                                              

 

             MCHC (test code = 34.1 g/dL    31.2-35                   



             786-4)                                              

 

             RDW-SD (test code = 41.7 fL      38.5-51.6                 



             63319-3)                                            

 

             RDW-CV (test code = 13.6 %       12.1-15.4                 



             788-0)                                              

 

             PLT (test code =              See_Comment  H             [Automated



             777-3)                                              message] The sy

stem



                                                                 which generated



                                                                 this result



                                                                 transmitted



                                                                 reference range

:



                                                                 150 - 328 10*3/

?L.



                                                                 The reference r

zhen



                                                                 was not used to



                                                                 interpret this



                                                                 result as



                                                                 normal/abnormal

.

 

             MPV (test code = 10.6 fL      9.8-13                    



             79004-2)                                            

 

             NRBC/100 WBC (test              See_Comment                [Automat

ed



             code = [id number])                                        message] 

The system



                                                                 which generated



                                                                 this result



                                                                 transmitted



                                                                 reference range

:



                                                                 0.0 - 10.0 /100



                                                                 WBCs. The refer

ence



                                                                 range was not u

sed



                                                                 to interpret th

is



                                                                 result as



                                                                 normal/abnormal

.

 

             NRBC x10^3 (test code              See_Comment                [Auto

mated



             = [id number])                                        message] The s

ystem



                                                                 which generated



                                                                 this result



                                                                 transmitted



                                                                 reference range

:



                                                                 10*3/?L. The



                                                                 reference range

 was



                                                                 not used to



                                                                 interpret this



                                                                 result as



                                                                 normal/abnormal

.

 

             GRAN MAT (NEUT) % 66.6 %                                 



             (test code = 770-8)                                        

 

             IMM GRAN % (test code 0.40 %                                 



             = 2344307416)                                        

 

             LYMPH % (test code = 20.5 %                                 



             736-9)                                              

 

             MONO % (test code = 10.9 %                                 



             5905-5)                                             

 

             EOS % (test code = 1.5 %                                  



             713-8)                                              

 

             BASO % (test code = 0.1 %                                  



             706-2)                                              

 

             GRAN MAT x10^3(ANC) 4.88 10*3/uL 1.99-6.95                 



             (test code =                                        



             1028247152)                                         

 

             IMM GRAN x10^3 (test 0.03 10*3/uL 0-0.06                    



             code = 0115616981)                                        

 

             LYMPH x10^3 (test code 1.50 10*3/uL 1.09-3.23                 



             = 731-0)                                            

 

             MONO x10^3 (test code 0.80 10*3/uL 0.36-1.02                 



             = 742-7)                                            

 

             EOS x10^3 (test code = 0.11 10*3/uL 0.06-0.53                 



             711-2)                                              

 

             BASO x10^3 (test code              0.01-0.09                 



             = 704-7)                                            

 

             Lab Interpretation Abnormal                               



             (test code = 03073-3)                                        



[facility] WITH [id number] 11:05:43





             Test Item    Value        Reference Range Interpretation Comments

 

             WBC (test code =              See_Comment  H             [Automated



             7390-2)                                             message] The sy

stem



                                                                 which generated



                                                                 this result



                                                                 transmitted



                                                                 reference range

:



                                                                 4.20 - 10.70



                                                                 10*3/?L. The



                                                                 reference range

 was



                                                                 not used to



                                                                 interpret this



                                                                 result as



                                                                 normal/abnormal

.

 

             RBC (test code =              See_Comment                [Automated



             669-8)                                              message] The sy

stem



                                                                 which generated



                                                                 this result



                                                                 transmitted



                                                                 reference range

:



                                                                 4.26 - 5.52



                                                                 10*6/?L. The



                                                                 reference range

 was



                                                                 not used to



                                                                 interpret this



                                                                 result as



                                                                 normal/abnormal

.

 

             HGB (test code = 15.6 g/dL    12.2-16.4                 



             718-7)                                              

 

             HCT (test code = 46.3 %       38.4-49.3                 



             4544-3)                                             

 

             MCV (test code = 85.4 fL      81.7-95.6                 



             787-2)                                              

 

             MCH (test code = 28.8 pg      26.1-32.7                 



             785-6)                                              

 

             MCHC (test code = 33.7 g/dL    31.2-35.0                 



             786-4)                                              

 

             RDW-SD (test code = 41.7 fL      38.5-51.6                 



             75763-3)                                            

 

             RDW-CV (test code = 13.4 %       12.1-15.4                 



             788-0)                                              

 

             PLT (test code =              See_Comment  H             [Automated



             777-3)                                              message] The sy

stem



                                                                 which generated



                                                                 this result



                                                                 transmitted



                                                                 reference range

:



                                                                 150 - 328 10*3/

?L.



                                                                 The reference r

zhen



                                                                 was not used to



                                                                 interpret this



                                                                 result as



                                                                 normal/abnormal

.

 

             MPV (test code = 10.3 fL      9.8-13.0                  



             12063-0)                                            

 

             NRBC/100 WBC (test              See_Comment                [Automat

ed



             code = 0004438147)                                        message] 

The system



                                                                 which generated



                                                                 this result



                                                                 transmitted



                                                                 reference range

:



                                                                 0.0 - 10.0 /100



                                                                 WBCs. The refer

ence



                                                                 range was not u

sed



                                                                 to interpret th

is



                                                                 result as



                                                                 normal/abnormal

.

 

             NRBC x10^3 (test code <0.01        See_Comment                [Auto

mated



             = [id number])                                        message] The s

ystem



                                                                 which generated



                                                                 this result



                                                                 transmitted



                                                                 reference range

:



                                                                 10*3/?L. The



                                                                 reference range

 was



                                                                 not used to



                                                                 interpret this



                                                                 result as



                                                                 normal/abnormal

.

 

             GRAN MAT (NEUT) % 71.2 %                                 



             (test code = 770-8)                                        

 

             IMM GRAN % (test code 1.30 %                                 



             = 1334110387)                                        

 

             LYMPH % (test code = 18.2 %                                 



             736-9)                                              

 

             MONO % (test code = 7.1 %                                  



             5905-5)                                             

 

             EOS % (test code = 1.9 %                                  



             713-8)                                              

 

             BASO % (test code = 0.3 %                                  



             706-2)                                              

 

             GRAN MAT x10^3(ANC) 8.37 10*3/uL 1.99-6.95    H            



             (test code =                                        



             9690608167)                                         

 

             IMM GRAN x10^3 (test 0.15 10*3/uL 0.00-0.06    H            



             code = 5716622754)                                        

 

             LYMPH x10^3 (test code 2.14 10*3/uL 1.09-3.23                 



             = 731-0)                                            

 

             MONO x10^3 (test code 0.84 10*3/uL 0.36-1.02                 



             = 742-7)                                            

 

             EOS x10^3 (test code = 0.22 10*3/uL 0.06-0.53                 



             711-2)                                              

 

             BASO x10^3 (test code 0.04 10*3/uL 0.01-0.09                 



             = 704-7)                                            

 

             Lab Interpretation Abnormal                               



             (test code = 53120-1)                                        



[facility]. METABOLIC PANEL ([id number] 
11:03:21





             Test Item    Value        Reference Range Interpretation Comments

 

             NA (test code = 133 mmol/L   135-145      L            



             [id number])                                         

 

             K (test code = 4.3 mmol/L   3.5-5.0                   



             7434661734)                                         

 

             CL (test code = 99 mmol/L                        



             0500151343)                                         

 

             CO2 TOTAL (test code = 20 mmol/L    23-31        L            



             1404203997)                                         

 

             AGAP (test code =              2-16                      



             9721885986)                                         

 

             BUN (test code = 11 mg/dL     7-23                      



             5382270178)                                         

 

             GLUCOSE (test code = 357 mg/dL           H            



             4467333193)                                         

 

             CREATININE (test code = 0.52 mg/dL   0.60-1.25    L            



             2436478190)                                         

 

             TOTAL BILI (test code = 0.7 mg/dL    0.1-1.1                   



             5165412109)                                         

 

             CALCIUM (test code = 9.6 mg/dL    8.6-10.6                  



             6717598312)                                         

 

             T PROTEIN (test code = 7.5 g/dL     6.3-8.2                   



             4366605695)                                         

 

             ALBUMIN (test code = 4.2 g/dL     3.5-5.0                   



             0960831273)                                         

 

             ALK PHOS (test code = 185 U/L             H            



             3405407705)                                         

 

             ALTv (test code = 78 U/L       5-50         H            



             1742-6)                                             

 

             AST(SGOT) (test code = 18 U/L       13-40                     



             4935787826)                                         

 

             eGFR (test code =              mL/min/1.73m2              



             2265502318)                                         

 

             MAL (test code = MAL) Association of                           



                          Glomerular Filtration                           



                          Rate (GFR) and Staging                           



                          of Kidney Disease*                           



                          +---------------------                           



                          --+-------------------                           



                          --+-------------------                           



                          ------+| GFR                           



                          (mL/min/1.73 m2) ?|                           



                          With Kidney Damage ?|                           



                          ?Without Kidney                           



                          Damage+---------------                           



                          --------+-------------                           



                          --------+-------------                           



                          ------------+| ?>90 ?                           



                          ? ? ? ? ? ? ? ?|                           



                          ?Stage one ? ? ? ? ?|                           



                          ? Normal ? ? ? ? ? ? ?                           



                          ?+--------------------                           



                          ---+------------------                           



                          ---+------------------                           



                          -------+| ?60-89 ? ? ?                           



                          ? ? ? ? ?| ?Stage two                           



                          ? ? ? ? ?| ? Decreased                           



                          GFR ? ? ? ?                            



                          +---------------------                           



                          --+-------------------                           



                          --+-------------------                           



                          ------+| ?30-59 ? ? ?                           



                          ? ? ? ? ?| ?Stage                           



                          three ? ? ? ?| ? Stage                           



                          three ? ? ? ? ?                           



                          +---------------------                           



                          --+-------------------                           



                          --+-------------------                           



                          ------+| ?15-29 ? ? ?                           



                          ? ? ? ? ?| ?Stage four                           



                          ? ? ? ? | ? Stage four                           



                          ? ? ? ? ?                              



                          ?+--------------------                           



                          ---+------------------                           



                          ---+------------------                           



                          -------+| ?<15 (or                           



                          dialysis) ? ?| ?Stage                           



                          five ? ? ? ? | ? Stage                           



                          five ? ? ? ? ?                           



                          ?+--------------------                           



                          ---+------------------                           



                          ---+------------------                           



                          -------+ *Each stage                           



                          assumes the associated                           



                          GFR level has been in                           



                          effect for at least                           



                          three months. ?Stages                           



                          1 to 5, with or                           



                          without kidney                           



                          disease, indicate                           



                          chronic kidney                           



                          disease. Notes:                           



                          Determination of                           



                          stages one and two                           



                          (with eGFR                             



                          >59mL/min/1.73 m2)                           



                          requires estimation of                           



                          kidney damage for at                           



                          least three months as                           



                          defined by structural                           



                          or functional                           



                          abnormalities of the                           



                          kidney, manifested by                           



                          either:Pathological                           



                          abnormalities or                           



                          Markers of kidney                           



                          damage (including                           



                          abnormalities in the                           



                          composition of the                           



                          blood or urine or                           



                          abnormalities in                           



                          imaging tests).                           

 

             Lab Interpretation Abnormal                               



             (test code = 59702-2)                                        



[facility]





             Test Item    Value        Reference Range Interpretation Comments

 

             LIPASE (test code = 5744096808) 63 U/L       0-220                 

    

 

             Lab Interpretation (test code = Normal                             

    



             27140-5)                                            



[facility] GLU[facility]SE (AUTOMATED)2022 22:54:04





             Test Item    Value        Reference Range Interpretation Comments

 

             POCT GLU (test code = 8731211211) 361 mg/dL           H      

      

 

             Lab Interpretation (test code = Abnormal                           

    



             55302-7)                                            



[facility] GLUCOSE (AUTOMATED)2022 12:19:07





             Test Item    Value        Reference Range Interpretation Comments

 

             POCT GLU (test code = 5601944400) 390 mg/dL           H      

      

 

             Lab Interpretation (test code = Abnormal                           

    



             90407-6)                                            



[facility] hospital GLUCOSE (AUTOMATED)2022 04:48:35





             Test Item    Value        Reference Range Interpretation Comments

 

             POCT GLU (test code = 0153889324) 412 mg/dL           H      

      

 

             Lab Interpretation (test code = Abnormal                           

    



             49012-2)                                            



[facility] hospital GLUCOSE (AUTOMATED)2022 01:44:33





             Test Item    Value        Reference Range Interpretation Comments

 

             POCT GLU (test code = 8414725718) 376 mg/dL           H      

      

 

             Lab Interpretation (test code = Abnormal                           

    



             64898-6)                                            



[facility] GLUCOSE (AUTOMATED)2022 21:51:37





             Test Item    Value        Reference Range Interpretation Comments

 

             POCT GLU (test code = 2261137422) 267 mg/dL           H      

      

 

             Lab Interpretation (test code = Abnormal                           

    



             34335-1)                                            



[facility] hospital GLUCOSE (AUTOMATED)2022 17:05:21





             Test Item    Value        Reference Range Interpretation Comments

 

             POCT GLU (test code = 6306844353) 377 mg/dL           H      

      

 

             Lab Interpretation (test code = Abnormal                           

    



             99352-8)                                            



[facility] hospital GLUCOSE (AUTOMATED)2022 13:10:54





             Test Item    Value        Reference Range Interpretation Comments

 

             POCT GLU (test code = 4595571120) 495 mg/dL           HH     

      

 

             Lab Interpretation (test code = Abnormal                           

    



             99628-7)                                            



[facility] hospital GLUCOSE (AUTOMATED)2022 08:34:04





             Test Item    Value        Reference Range Interpretation Comments

 

             POCT GLU (test code = 7636321486) 427 mg/dL           H      

      

 

             Lab Interpretation (test code = Abnormal                           

    



             60106-8)                                            



[facility] hospital GLUCOSE (AUTOMATED)2022 05:46:02





             Test Item    Value        Reference Range Interpretation Comments

 

             POCT GLU (test code = 9272250687) 451 mg/dL           HH     

      

 

             Lab Interpretation (test code = Abnormal                           

    



             17974-3)                                            



[facility] hospital GLUCOSE (AUTOMATED)2022 02:44:38





             Test Item    Value        Reference Range Interpretation Comments

 

             POCT GLU (test code = 7554680154) 429 mg/dL           H      

      

 

             Lab Interpretation (test code = Abnormal                           

    



             34170-6)                                            



[facility] acute hospital GLUCOSE (AUTOMATED)2022 22:38:24





             Test Item    Value        Reference Range Interpretation Comments

 

             POCT GLU (test code = 0057800073) 404 mg/dL           H      

      

 

             Lab Interpretation (test code = Abnormal                           

    



             65106-3)                                            



[facility] acute hospital GLUCOSE (AUTOMATED)2022 18:05:20





             Test Item    Value        Reference Range Interpretation Comments

 

             POCT GLU (test code = 8861531635) 423 mg/dL           H      

      

 

             Lab Interpretation (test code = Abnormal                           

    



             73070-3)                                            



[facility] METABOLIC PANEL (NA, K, CL, CO2, 
GLUCOSE, BUN, CREATININE, CA)2022 14:56:25





             Test Item    Value        Reference Range Interpretation Comments

 

             NA (test code = 135 mmol/L   135-145                   



             9012062259)                                         

 

             K (test code = 4.0 mmol/L   3.5-5.0                   



             0479894266)                                         

 

             CL (test code = 102 mmol/L                       



             5057277005)                                         

 

             CO2 TOTAL (test code = 22 mmol/L    23-31        L            



             8121894580)                                         

 

             AGAP (test code =              2-16                      



             9754942898)                                         

 

             BUN (test code = 13 mg/dL     7-23                      



             2452461429)                                         

 

             GLUCOSE (test code = 547 mg/dL                      



             2483538911)                                         

 

             CREATININE (test code = 0.65 mg/dL   0.60-1.25                 



             6518772382)                                         

 

             CALCIUM (test code = 8.6 mg/dL    8.6-10.6                  



             9781418385)                                         

 

             eGFR (test code =              mL/min/1.73m2              



             9725575904)                                         

 

             MAL (test code = MAL) Association of                           



                          Glomerular Filtration                           



                          Rate (GFR) and Staging                           



                          of Kidney Disease*                           



                          +---------------------                           



                          --+-------------------                           



                          --+-------------------                           



                          ------+| GFR                           



                          (mL/min/1.73 m2) ?|                           



                          With Kidney Damage ?|                           



                          ?Without Kidney                           



                          Damage+---------------                           



                          --------+-------------                           



                          --------+-------------                           



                          ------------+| ?>90 ?                           



                          ? ? ? ? ? ? ? ?|                           



                          ?Stage one ? ? ? ? ?|                           



                          ? Normal ? ? ? ? ? ? ?                           



                          ?+--------------------                           



                          ---+------------------                           



                          ---+------------------                           



                          -------+| ?60-89 ? ? ?                           



                          ? ? ? ? ?| ?Stage two                           



                          ? ? ? ? ?| ? Decreased                           



                          GFR ? ? ? ?                            



                          +---------------------                           



                          --+-------------------                           



                          --+-------------------                           



                          ------+| ?30-59 ? ? ?                           



                          ? ? ? ? ?| ?Stage                           



                          three ? ? ? ?| ? Stage                           



                          three ? ? ? ? ?                           



                          +---------------------                           



                          --+-------------------                           



                          --+-------------------                           



                          ------+| ?15-29 ? ? ?                           



                          ? ? ? ? ?| ?Stage four                           



                          ? ? ? ? | ? Stage four                           



                          ? ? ? ? ?                              



                          ?+--------------------                           



                          ---+------------------                           



                          ---+------------------                           



                          -------+| ?<15 (or                           



                          dialysis) ? ?| ?Stage                           



                          five ? ? ? ? | ? Stage                           



                          five ? ? ? ? ?                           



                          ?+--------------------                           



                          ---+------------------                           



                          ---+------------------                           



                          -------+ *Each stage                           



                          assumes the associated                           



                          GFR level has been in                           



                          effect for at least                           



                          three months. ?Stages                           



                          1 to 5, with or                           



                          without kidney                           



                          disease, indicate                           



                          chronic kidney                           



                          disease. Notes:                           



                          Determination of                           



                          stages one and two                           



                          (with eGFR                             



                          >59mL/min/1.73 m2)                           



                          requires estimation of                           



                          kidney damage for at                           



                          least three months as                           



                          defined by structural                           



                          or functional                           



                          abnormalities of the                           



                          kidney, manifested by                           



                          either:Pathological                           



                          abnormalities or                           



                          Markers of kidney                           



                          damage (including                           



                          abnormalities in the                           



                          composition of the                           



                          blood or urine or                           



                          abnormalities in                           



                          imaging tests).                           

 

             Lab Interpretation Abnormal                               



             (test code = 46150-9)                                        



[facility] hospital GLUCOSE (AUTOMATED)2022 13:36:27





             Test Item    Value        Reference Range Interpretation Comments

 

             POCT GLU (test code = 1323165889) 526 mg/dL           HH     

      

 

             Lab Interpretation (test code = Abnormal                           

    



             39645-8)                                            



[facility] acute hospital GLUCOSE (AUTOMATED)2022 12:52:44





             Test Item    Value        Reference Range Interpretation Comments

 

             POCT GLU (test code = 6773223494) >600                HH     

      

 

             Lab Interpretation (test code = Abnormal                           

    



             59721-3)                                            



[facility] acute hospital GLUCOSE (AUTOMATED)2022 12:52:44





             Test Item    Value        Reference Range Interpretation Comments

 

             POCT GLU (test code = 7420406617) >600                HH     

      

 

             Lab Interpretation (test code = Abnormal                           

    



             35237-6)                                            



[facility] acute hospital GLUCOSE (AUTOMATED)2022 11:02:02





             Test Item    Value        Reference Range Interpretation Comments

 

             POCT GLU (test code = 1121972858) 521 mg/dL           HH     

      

 

             Lab Interpretation (test code = Abnormal                           

    



             06942-9)                                            



[facility] acute hospital GLUCOSE (AUTOMATED)2022 07:22:18





             Test Item    Value        Reference Range Interpretation Comments

 

             POCT GLU (test code = 2704345347) 534 mg/dL           HH     

      

 

             Lab Interpretation (test code = Abnormal                           

    



             96392-6)                                            



[facility] METABOLIC PANEL (NA, K, CL, CO2, 
GLUCOSE, BUN, CREATININE, CA)2022 06:34:51





             Test Item    Value        Reference Range Interpretation Comments

 

             NA (test code = 133 mmol/L   135-145      L            



             8587636303)                                         

 

             K (test code = 4.9 mmol/L   3.5-5.0                   Slight



             0819059365)                                         hemolysis

 

             CL (test code = 96 mmol/L           L            



             7113316649)                                         

 

             CO2 TOTAL (test code 21 mmol/L    23-31        L            



             = 8102508707)                                        

 

             AGAP (test code =              2-16                      



             3056999366)                                         

 

             BUN (test code = 12 mg/dL     7-23                      Slight



             7455953902)                                         hemolysis

 

             GLUCOSE (test code = 639 mg/dL           HH           



             5775764841)                                         

 

             CREATININE (test code 0.46 mg/dL   0.60-1.25    L            



             = 1271304325)                                        

 

             CALCIUM (test code = 9.7 mg/dL    8.6-10.6                  



             6786939296)                                         

 

             eGFR (test code =              mL/min/1.73m2              



             4386279914)                                         

 

             MAL (test code = MAL) Association of                           



                          Glomerular                             



                          Filtration Rate                           



                          (GFR) and Staging                           



                          of Kidney Disease*                           



                          +------------------                           



                          -----+-------------                           



                          --------+----------                           



                          ---------------+|                           



                          GFR (mL/min/1.73                           



                          m2) ?| With Kidney                           



                          Damage ?| ?Without                           



                          Kidney                                 



                          Damage+------------                           



                          -----------+-------                           



                          --------------+----                           



                          -------------------                           



                          --+| ?>90 ? ? ? ? ?                           



                          ? ? ? ?| ?Stage one                           



                          ? ? ? ? ?| ? Normal                           



                          ? ? ? ? ? ? ?                           



                          ?+-----------------                           



                          ------+------------                           



                          ---------+---------                           



                          ----------------+|                           



                          ?60-89 ? ? ? ? ? ?                           



                          ? ?| ?Stage two ? ?                           



                          ? ? ?| ? Decreased                           



                          GFR ? ? ? ?                            



                          +------------------                           



                          -----+-------------                           



                          --------+----------                           



                          ---------------+|                           



                          ?30-59 ? ? ? ? ? ?                           



                          ? ?| ?Stage three ?                           



                          ? ? ?| ? Stage                           



                          three ? ? ? ? ?                           



                          +------------------                           



                          -----+-------------                           



                          --------+----------                           



                          ---------------+|                           



                          ?15-29 ? ? ? ? ? ?                           



                          ? ?| ?Stage four ?                           



                          ? ? ? | ? Stage                           



                          four ? ? ? ? ?                           



                          ?+-----------------                           



                          ------+------------                           



                          ---------+---------                           



                          ----------------+|                           



                          ?<15 (or dialysis)                           



                          ? ?| ?Stage five ?                           



                          ? ? ? | ? Stage                           



                          five ? ? ? ? ?                           



                          ?+-----------------                           



                          ------+------------                           



                          ---------+---------                           



                          ----------------+                           



                          *Each stage assumes                           



                          the associated GFR                           



                          level has been in                           



                          effect for at least                           



                          three months.                           



                          ?Stages 1 to 5,                           



                          with or without                           



                          kidney disease,                           



                          indicate chronic                           



                          kidney disease.                           



                          Notes:                                 



                          Determination of                           



                          stages one and two                           



                          (with eGFR                             



                          >59mL/min/1.73 m2)                           



                          requires estimation                           



                          of kidney damage                           



                          for at least three                           



                          months as defined                           



                          by structural or                           



                          functional                             



                          abnormalities of                           



                          the kidney,                            



                          manifested by                           



                          either:Pathological                           



                          abnormalities or                           



                          Markers of kidney                           



                          damage (including                           



                          abnormalities in                           



                          the composition of                           



                          the blood or urine                           



                          or abnormalities in                           



                          imaging tests).                           

 

             Lab Interpretation Abnormal                               



             (test code = 74930-8)                                        



[facility]. METABOLIC PANEL ([id number] 
02:59:57





             Test Item    Value        Reference Range Interpretation Comments

 

             NA (test code = 126 mmol/L   135-145      L            



             6053683966)                                         

 

             K (test code = 5.2 mmol/L   3.5-5.0      H            



             6169502024)                                         

 

             CL (test code = 89 mmol/L           L            



             8439410632)                                         

 

             CO2 TOTAL (test code = 20 mmol/L    23-31        L            



             6349687417)                                         

 

             AGAP (test code =              2-16         H            



             7000039776)                                         

 

             BUN (test code = 12 mg/dL     7-23                      



             2780131764)                                         

 

             GLUCOSE (test code = 856 mg/dL           HH           



             9887474532)                                         

 

             CREATININE (test code = 0.49 mg/dL   0.60-1.25    L            



             4438008566)                                         

 

             TOTAL BILI (test code = 0.7 mg/dL    0.1-1.1                   



             3409747741)                                         

 

             CALCIUM (test code = 10.2 mg/dL   8.6-10.6                  



             1989379583)                                         

 

             T PROTEIN (test code = 8.2 g/dL     6.3-8.2                   



             3653982285)                                         

 

             ALBUMIN (test code = 4.5 g/dL     3.5-5.0                   



             4715698271)                                         

 

             ALK PHOS (test code = 186 U/L             H            



             3313666509)                                         

 

             ALTv (test code = 27 U/L       5-50                      



             1742-6)                                             

 

             AST(SGOT) (test code = 18 U/L       13-40                     



             0436455610)                                         

 

             eGFR (test code =              mL/min/1.73m2              



             0788348871)                                         

 

             MAL (test code = MAL) Association of                           



                          Glomerular Filtration                           



                          Rate (GFR) and Staging                           



                          of Kidney Disease*                           



                          +---------------------                           



                          --+-------------------                           



                          --+-------------------                           



                          ------+| GFR                           



                          (mL/min/1.73 m2) ?|                           



                          With Kidney Damage ?|                           



                          ?Without Kidney                           



                          Damage+---------------                           



                          --------+-------------                           



                          --------+-------------                           



                          ------------+| ?>90 ?                           



                          ? ? ? ? ? ? ? ?|                           



                          ?Stage one ? ? ? ? ?|                           



                          ? Normal ? ? ? ? ? ? ?                           



                          ?+--------------------                           



                          ---+------------------                           



                          ---+------------------                           



                          -------+| ?60-89 ? ? ?                           



                          ? ? ? ? ?| ?Stage two                           



                          ? ? ? ? ?| ? Decreased                           



                          GFR ? ? ? ?                            



                          +---------------------                           



                          --+-------------------                           



                          --+-------------------                           



                          ------+| ?30-59 ? ? ?                           



                          ? ? ? ? ?| ?Stage                           



                          three ? ? ? ?| ? Stage                           



                          three ? ? ? ? ?                           



                          +---------------------                           



                          --+-------------------                           



                          --+-------------------                           



                          ------+| ?15-29 ? ? ?                           



                          ? ? ? ? ?| ?Stage four                           



                          ? ? ? ? | ? Stage four                           



                          ? ? ? ? ?                              



                          ?+--------------------                           



                          ---+------------------                           



                          ---+------------------                           



                          -------+| ?<15 (or                           



                          dialysis) ? ?| ?Stage                           



                          five ? ? ? ? | ? Stage                           



                          five ? ? ? ? ?                           



                          ?+--------------------                           



                          ---+------------------                           



                          ---+------------------                           



                          -------+ *Each stage                           



                          assumes the associated                           



                          GFR level has been in                           



                          effect for at least                           



                          three months. ?Stages                           



                          1 to 5, with or                           



                          without kidney                           



                          disease, indicate                           



                          chronic kidney                           



                          disease. Notes:                           



                          Determination of                           



                          stages one and two                           



                          (with eGFR                             



                          >59mL/min/1.73 m2)                           



                          requires estimation of                           



                          kidney damage for at                           



                          least three months as                           



                          defined by structural                           



                          or functional                           



                          abnormalities of the                           



                          kidney, manifested by                           



                          either:Pathological                           



                          abnormalities or                           



                          Markers of kidney                           



                          damage (including                           



                          abnormalities in the                           



                          composition of the                           



                          blood or urine or                           



                          abnormalities in                           



                          imaging tests).                           

 

             Lab Interpretation Abnormal                               



             (test code = 43778-2)                                        



[facility]





             Test Item    Value        Reference Range Interpretation Comments

 

             LIPASE (test code = 0258720975) 146 U/L      0-220                 

    

 

             Lab Interpretation (test code = Normal                             

    



             88475-2)                                            



[facility] WITH [id number] 02:34:07





             Test Item    Value        Reference Range Interpretation Comments

 

             WBC (test code =              See_Comment                [Automated



             6690-2)                                             message] The sy

stem



                                                                 which generated



                                                                 this result



                                                                 transmitted



                                                                 reference range

:



                                                                 4.20 - 10.70



                                                                 10*3/?L. The



                                                                 reference range

 was



                                                                 not used to



                                                                 interpret this



                                                                 result as



                                                                 normal/abnormal

.

 

             RBC (test code =              See_Comment                [Automated



             789-8)                                              message] The sy

stem



                                                                 which generated



                                                                 this result



                                                                 transmitted



                                                                 reference range

:



                                                                 4.26 - 5.52



                                                                 10*6/?L. The



                                                                 reference range

 was



                                                                 not used to



                                                                 interpret this



                                                                 result as



                                                                 normal/abnormal

.

 

             HGB (test code = 16.1 g/dL    12.2-16.4                 



             718-7)                                              

 

             HCT (test code = 47.5 %       38.4-49.3                 



             4544-3)                                             

 

             MCV (test code = 86.2 fL      81.7-95.6                 



             787-2)                                              

 

             MCH (test code = 29.2 pg      26.1-32.7                 



             785-6)                                              

 

             MCHC (test code = 33.9 g/dL    31.2-35.0                 



             786-4)                                              

 

             RDW-SD (test code = 42.0 fL      38.5-51.6                 



             64768-4)                                            

 

             RDW-CV (test code = 13.5 %       12.1-15.4                 



             788-0)                                              

 

             PLT (test code =              See_Comment  H             [Automated



             777-3)                                              message] The sy

stem



                                                                 which generated



                                                                 this result



                                                                 transmitted



                                                                 reference range

:



                                                                 150 - 328 10*3/

?L.



                                                                 The reference r

[place]



                                                                 was not used to



                                                                 interpret this



                                                                 result as



                                                                 normal/abnormal

.

 

             MPV (test code = 10.5 fL      9.8-13.0                  



             40101-5)                                            

 

             NRBC/100 WBC (test              See_Comment                [Automat

ed



             code = 6697113679)                                        message] 

The system



                                                                 which generated



                                                                 this result



                                                                 transmitted



                                                                 reference range

:



                                                                 0.0 - 10.0 /100



                                                                 WBCs. The refer

ence



                                                                 range was not u

sed



                                                                 to interpret th

is



                                                                 result as



                                                                 normal/abnormal

.

 

             NRBC x10^3 (test code <0.01        See_Comment                [Auto

mated



             = [id number])                                        message] The s

[employer]



                                                                 which generated



                                                                 this result



                                                                 transmitted



                                                                 reference range

:



                                                                 10*3/?L. The



                                                                 reference range

 was



                                                                 not used to



                                                                 interpret this



                                                                 result as



                                                                 normal/abnormal

.

 

             GRAN MAT (NEUT) % 67.5 %                                 



             (test code = 770-8)                                        

 

             IMM GRAN % (test code 0.70 %                                 



             = [id number])                                        

 

             LYMPH % (test code = 21.7 %                                 



             736-9)                                              

 

             MONO % (test code = 8.4 %                                  



             5905-5)                                             

 

             EOS % (test code = 1.3 %                                  



             713-8)                                              

 

             BASO % (test code = 0.4 %                                  



             706-2)                                              

 

             GRAN MAT x10^3(ANC) 6.61 10*3/uL 1.99-6.95                 



             (test code =                                        



             5595964716)                                         

 

             IMM GRAN x10^3 (test 0.07 10*3/uL 0.00-0.06    H            



             code = 6331344288)                                        

 

             LYMPH x10^3 (test code 2.12 10*3/uL 1.09-3.23                 



             = 731-0)                                            

 

             MONO x10^3 (test code 0.82 10*3/uL 0.36-1.02                 



             = 742-7)                                            

 

             EOS x10^3 (test code = 0.13 10*3/uL 0.06-0.53                 



             711-2)                                              

 

             BASO x10^3 (test code 0.04 10*3/uL 0.01-0.09                 



             = 704-7)                                            

 

             Lab Interpretation Abnormal                               



             (test code = 61908-9)                                        



[id number] (ALB,T.PRO,BILI
T,BU/BC,ALT,AST,ALK PHOS)2022 13:57:09





             Test Item    Value        Reference Range Interpretation Comments

 

             TOTAL BILI (test code = 0372746994) 0.7 mg/dL    0.1-1.1           

        

 

             BILI UNCON (test code = 0618497893) 0.3 mg/dL    0.1-1.1           

        

 

             BILI CONJ (test code = 6639465151) 0.0 mg/dL    0.0-0.3            

       

 

             T PROTEIN (test code = 9879847379) 7.5 g/dL     6.3-8.2            

       

 

             ALBUMIN (test code = 9600209465) 3.8 g/dL     3.5-5.0              

     

 

             ALK PHOS (test code = 8147703983) 161 U/L             H      

      

 

             ALTv (test code = 1742-6) 44 U/L       5-50                      

 

             AST(SGOT) (test code = 5345475957) 47 U/L       13-40        H     

       

 

             Lab Interpretation (test code = Abnormal                           

    



             47066-6)                                            



[facility] METABOLIC PANEL (NA, K, CL, CO2, 
GLUCOSE, BUN, CREATININE, CA)2022 10:48:59





             Test Item    Value        Reference Range Interpretation Comments

 

             NA (test code = 136 mmol/L   135-145                   



             9274798316)                                         

 

             K (test code = 3.8 mmol/L   3.5-5.0                   



             0732907126)                                         

 

             CL (test code = 101 mmol/L                       



             7831520835)                                         

 

             CO2 TOTAL (test code = 22 mmol/L    23-31        L            



             3624692175)                                         

 

             AGAP (test code =              2-16                      



             9876296544)                                         

 

             BUN (test code = 16 mg/dL     7-23                      



             0216691414)                                         

 

             GLUCOSE (test code = 358 mg/dL           H            



             7320462304)                                         

 

             CREATININE (test code = 0.63 mg/dL   0.60-1.25                 



             1385625279)                                         

 

             CALCIUM (test code = 9.2 mg/dL    8.6-10.6                  



             1627842980)                                         

 

             eGFR (test code =              mL/min/1.73m2              



             4820620755)                                         

 

             MAL (test code = MAL) Association of                           



                          Glomerular Filtration                           



                          Rate (GFR) and Staging                           



                          of Kidney Disease*                           



                          +---------------------                           



                          --+-------------------                           



                          --+-------------------                           



                          ------+| GFR                           



                          (mL/min/1.73 m2) ?|                           



                          With Kidney Damage ?|                           



                          ?Without Kidney                           



                          Damage+---------------                           



                          --------+-------------                           



                          --------+-------------                           



                          ------------+| ?>90 ?                           



                          ? ? ? ? ? ? ? ?|                           



                          ?Stage one ? ? ? ? ?|                           



                          ? Normal ? ? ? ? ? ? ?                           



                          ?+--------------------                           



                          ---+------------------                           



                          ---+------------------                           



                          -------+| ?60-89 ? ? ?                           



                          ? ? ? ? ?| ?Stage two                           



                          ? ? ? ? ?| ? Decreased                           



                          GFR ? ? ? ?                            



                          +---------------------                           



                          --+-------------------                           



                          --+-------------------                           



                          ------+| ?30-59 ? ? ?                           



                          ? ? ? ? ?| ?Stage                           



                          three ? ? ? ?| ? Stage                           



                          three ? ? ? ? ?                           



                          +---------------------                           



                          --+-------------------                           



                          --+-------------------                           



                          ------+| ?15-29 ? ? ?                           



                          ? ? ? ? ?| ?Stage four                           



                          ? ? ? ? | ? Stage four                           



                          ? ? ? ? ?                              



                          ?+--------------------                           



                          ---+------------------                           



                          ---+------------------                           



                          -------+| ?<15 (or                           



                          dialysis) ? ?| ?Stage                           



                          five ? ? ? ? | ? Stage                           



                          five ? ? ? ? ?                           



                          ?+--------------------                           



                          ---+------------------                           



                          ---+------------------                           



                          -------+ *Each stage                           



                          assumes the associated                           



                          GFR level has been in                           



                          effect for at least                           



                          three months. ?Stages                           



                          1 to 5, with or                           



                          without kidney                           



                          disease, indicate                           



                          chronic kidney                           



                          disease. Notes:                           



                          Determination of                           



                          stages one and two                           



                          (with eGFR                             



                          >59mL/min/1.73 m2)                           



                          requires estimation of                           



                          kidney damage for at                           



                          least three months as                           



                          defined by structural                           



                          or functional                           



                          abnormalities of the                           



                          kidney, manifested by                           



                          either:Pathological                           



                          abnormalities or                           



                          Markers of kidney                           



                          damage (including                           



                          abnormalities in the                           



                          composition of the                           



                          blood or urine or                           



                          abnormalities in                           



                          imaging tests).                           

 

             Lab Interpretation Abnormal                               



             (test code = 39487-2)                                        



[facility] WITH [id number] 10:01:35





             Test Item    Value        Reference Range Interpretation Comments

 

             WBC (test code =              See_Comment                [Automated



             4990-2)                                             message] The sy

stem



                                                                 which generated



                                                                 this result



                                                                 transmitted



                                                                 reference range

:



                                                                 4.20 - 10.70



                                                                 10*3/?L. The



                                                                 reference range

 was



                                                                 not used to



                                                                 interpret this



                                                                 result as



                                                                 normal/abnormal

.

 

             RBC (test code =              See_Comment                [Automated



             359-8)                                              message] The sy

stem



                                                                 which generated



                                                                 this result



                                                                 transmitted



                                                                 reference range

:



                                                                 4.26 - 5.52



                                                                 10*6/?L. The



                                                                 reference range

 was



                                                                 not used to



                                                                 interpret this



                                                                 result as



                                                                 normal/abnormal

.

 

             HGB (test code = 14.9 g/dL    12.2-16.4                 



             718-7)                                              

 

             HCT (test code = 43.2 %       38.4-49.3                 



             4544-3)                                             

 

             MCV (test code = 86.1 fL      81.7-95.6                 



             787-2)                                              

 

             MCH (test code = 29.7 pg      26.1-32.7                 



             785-6)                                              

 

             MCHC (test code = 34.5 g/dL    31.2-35.0                 



             786-4)                                              

 

             RDW-SD (test code = 41.6 fL      38.5-51.6                 



             06954-8)                                            

 

             RDW-CV (test code = 13.4 %       12.1-15.4                 



             788-0)                                              

 

             PLT (test code =              See_Comment  H             [Automated



             777-3)                                              message] The sy

stem



                                                                 which generated



                                                                 this result



                                                                 transmitted



                                                                 reference range

:



                                                                 150 - 328 10*3/

?L.



                                                                 The reference r

zhen



                                                                 was not used to



                                                                 interpret this



                                                                 result as



                                                                 normal/abnormal

.

 

             MPV (test code = 11.0 fL      9.8-13.0                  



             14338-9)                                            

 

             NRBC/100 WBC (test              See_Comment                [Automat

ed



             code = 8528771516)                                        message] 

The system



                                                                 which generated



                                                                 this result



                                                                 transmitted



                                                                 reference range

:



                                                                 0.0 - 10.0 /100



                                                                 WBCs. The refer

ence



                                                                 range was not u

sed



                                                                 to interpret th

is



                                                                 result as



                                                                 normal/abnormal

.

 

             NRBC x10^3 (test code <0.01        See_Comment                [Auto

mated



             = 1835730030)                                        message] The s

ystem



                                                                 which generated



                                                                 this result



                                                                 transmitted



                                                                 reference range

:



                                                                 10*3/?L. The



                                                                 reference range

 was



                                                                 not used to



                                                                 interpret this



                                                                 result as



                                                                 normal/abnormal

.

 

             GRAN MAT (NEUT) % 62.6 %                                 



             (test code = 770-8)                                        

 

             IMM GRAN % (test code 0.40 %                                 



             = 5651734670)                                        

 

             LYMPH % (test code = 23.2 %                                 



             736-9)                                              

 

             MONO % (test code = 9.6 %                                  



             5905-5)                                             

 

             EOS % (test code = 3.8 %                                  



             713-8)                                              

 

             BASO % (test code = 0.4 %                                  



             706-2)                                              

 

             GRAN MAT x10^3(ANC) 4.76 10*3/uL 1.99-6.95                 



             (test code =                                        



             2281898134)                                         

 

             IMM GRAN x10^3 (test 0.03 10*3/uL 0.00-0.06                 



             code = 3573181684)                                        

 

             LYMPH x10^3 (test code 1.76 10*3/uL 1.09-3.23                 



             = 731-0)                                            

 

             MONO x10^3 (test code 0.73 10*3/uL 0.36-1.02                 



             = 742-7)                                            

 

             EOS x10^3 (test code = 0.29 10*3/uL 0.06-0.53                 



             711-2)                                              

 

             BASO x10^3 (test code 0.03 10*3/uL 0.01-0.09                 



             = 704-7)                                            

 

             Lab Interpretation Abnormal                               



             (test code = 19974-7)                                        



[facility]. METABOLIC PANEL ([id number])2022 
04:36:59





             Test Item    Value        Reference Range Interpretation Comments

 

             NA (test code = 138 mmol/L   135-145                   



             2503144421)                                         

 

             K (test code = 5.4 mmol/L   3.5-5.0      H            



             9383842744)                                         

 

             CL (test code = 98 mmol/L                        



             2777094699)                                         

 

             CO2 TOTAL (test code = 17 mmol/L    23-31        L            



             0052506618)                                         

 

             AGAP (test code =              2-16         H            



             1052600914)                                         

 

             BUN (test code = 19 mg/dL     7-23                      



             3792647129)                                         

 

             GLUCOSE (test code = 469 mg/dL           HH           



             5769857170)                                         

 

             CREATININE (test code = 0.74 mg/dL   0.60-1.25                 



             5685002686)                                         

 

             TOTAL BILI (test code = 1.0 mg/dL    0.1-1.1                   



             1700326378)                                         

 

             CALCIUM (test code = 10.1 mg/dL   8.6-10.6                  



             9701777632)                                         

 

             T PROTEIN (test code = 8.2 g/dL     6.3-8.2                   



             2306430299)                                         

 

             ALBUMIN (test code = 4.6 g/dL     3.5-5.0                   



             1307358248)                                         

 

             ALK PHOS (test code = 208 U/L             H            



             0316322208)                                         

 

             ALTv (test code = 51 U/L       5-50         H            



             1742-6)                                             

 

             AST(SGOT) (test code = 18 U/L       13-40                     



             1828232689)                                         

 

             eGFR (test code =              mL/min/1.73m2              



             8996993166)                                         

 

             MLA (test code = MAL) Association of                           



                          Glomerular Filtration                           



                          Rate (GFR) and Staging                           



                          of Kidney Disease*                           



                          +---------------------                           



                          --+-------------------                           



                          --+-------------------                           



                          ------+| GFR                           



                          (mL/min/1.73 m2) ?|                           



                          With Kidney Damage ?|                           



                          ?Without Kidney                           



                          Damage+---------------                           



                          --------+-------------                           



                          --------+-------------                           



                          ------------+| ?>90 ?                           



                          ? ? ? ? ? ? ? ?|                           



                          ?Stage one ? ? ? ? ?|                           



                          ? Normal ? ? ? ? ? ? ?                           



                          ?+--------------------                           



                          ---+------------------                           



                          ---+------------------                           



                          -------+| ?60-89 ? ? ?                           



                          ? ? ? ? ?| ?Stage two                           



                          ? ? ? ? ?| ? Decreased                           



                          GFR ? ? ? ?                            



                          +---------------------                           



                          --+-------------------                           



                          --+-------------------                           



                          ------+| ?30-59 ? ? ?                           



                          ? ? ? ? ?| ?Stage                           



                          three ? ? ? ?| ? Stage                           



                          three ? ? ? ? ?                           



                          +---------------------                           



                          --+-------------------                           



                          --+-------------------                           



                          ------+| ?15-29 ? ? ?                           



                          ? ? ? ? ?| ?Stage four                           



                          ? ? ? ? | ? Stage four                           



                          ? ? ? ? ?                              



                          ?+--------------------                           



                          ---+------------------                           



                          ---+------------------                           



                          -------+| ?<15 (or                           



                          dialysis) ? ?| ?Stage                           



                          five ? ? ? ? | ? Stage                           



                          five ? ? ? ? ?                           



                          ?+--------------------                           



                          ---+------------------                           



                          ---+------------------                           



                          -------+ *Each stage                           



                          assumes the associated                           



                          GFR level has been in                           



                          effect for at least                           



                          three months. ?Stages                           



                          1 to 5, with or                           



                          without kidney                           



                          disease, indicate                           



                          chronic kidney                           



                          disease. Notes:                           



                          Determination of                           



                          stages one and two                           



                          (with eGFR                             



                          >59mL/min/1.73 m2)                           



                          requires estimation of                           



                          kidney damage for at                           



                          least three months as                           



                          defined by structural                           



                          or functional                           



                          abnormalities of the                           



                          kidney, manifested by                           



                          either:Pathological                           



                          abnormalities or                           



                          Markers of kidney                           



                          damage (including                           



                          abnormalities in the                           



                          composition of the                           



                          blood or urine or                           



                          abnormalities in                           



                          imaging tests).                           

 

             Lab Interpretation Abnormal                               



             (test code = 81338-6)                                        



[facility]





             Test Item    Value        Reference Range Interpretation Comments

 

             LIPASE (test code = 4529135582) 137 U/L      0-220                 

    

 

             Lab Interpretation (test code = Normal                             

    



             48049-2)                                            



[id number] 04[facility]07[facility]59





             Test Item    Value        Reference Range Interpretation Comments

 

             WBC (test code =              See_Comment  H             [Automated



             2290-2)                                             message] The sy

stem



                                                                 which generated



                                                                 this result



                                                                 transmitted



                                                                 reference range

:



                                                                 4.20 - 10.70



                                                                 10*3/?L. The



                                                                 reference range

 was



                                                                 not used to



                                                                 interpret this



                                                                 result as



                                                                 normal/abnormal

.

 

             RBC (test code =              See_Comment  H             [Automated



             469-8)                                              message] The sy

stem



                                                                 which generated



                                                                 this result



                                                                 transmitted



                                                                 reference range

:



                                                                 4.26 - 5.52



                                                                 10*6/?L. The



                                                                 reference range

 was



                                                                 not used to



                                                                 interpret this



                                                                 result as



                                                                 normal/abnormal

.

 

             HGB (test code = 16.8 g/dL    12.2-16.4    H            



             718-7)                                              

 

             HCT (test code = 49.2 %       38.4-49.3                 



             4544-3)                                             

 

             MCV (test code = 86.2 fL      81.7-95.6                 



             787-2)                                              

 

             MCH (test code = 29.4 pg      26.1-32.7                 



             785-6)                                              

 

             MCHC (test code = 34.1 g/dL    31.2-35.0                 



             786-4)                                              

 

             RDW-SD (test code = 42.6 fL      38.5-51.6                 



             09739-8)                                            

 

             RDW-CV (test code = 13.6 %       12.1-15.4                 



             788-0)                                              

 

             PLT (test code =              See_Comment  H             [Automated



             777-3)                                              message] The sy

stem



                                                                 which generated



                                                                 this result



                                                                 transmitted



                                                                 reference range

:



                                                                 150 - 328 10*3/

?L.



                                                                 The reference r

zhen



                                                                 was not used to



                                                                 interpret this



                                                                 result as



                                                                 normal/abnormal

.

 

             MPV (test code = 10.7 fL      9.8-13.0                  



             72078-5)                                            

 

             NRBC/100 WBC (test              See_Comment                [Automat

ed



             code = 2447017367)                                        message] 

The system



                                                                 which generated



                                                                 this result



                                                                 transmitted



                                                                 reference range

:



                                                                 0.0 - 10.0 /100



                                                                 WBCs. The refer

ence



                                                                 range was not u

sed



                                                                 to interpret th

is



                                                                 result as



                                                                 normal/abnormal

.

 

             NRBC x10^3 (test code <0.01        See_Comment                [Auto

mated



             = 9695505938)                                        message] The s

ystem



                                                                 which generated



                                                                 this result



                                                                 transmitted



                                                                 reference range

:



                                                                 10*3/?L. The



                                                                 reference range

 was



                                                                 not used to



                                                                 interpret this



                                                                 result as



                                                                 normal/abnormal

.

 

             GRAN MAT (NEUT) % 74.2 %                                 



             (test code = 770-8)                                        

 

             IMM GRAN % (test code 0.50 %                                 



             = 1591234138)                                        

 

             LYMPH % (test code = 14.8 %                                 



             736-9)                                              

 

             MONO % (test code = 8.3 %                                  



             5905-5)                                             

 

             EOS % (test code = 1.9 %                                  



             713-8)                                              

 

             BASO % (test code = 0.3 %                                  



             706-2)                                              

 

             GRAN MAT x10^3(ANC) 8.01 10*3/uL 1.99-6.95    H            



             (test code =                                        



             5611072458)                                         

 

             IMM GRAN x10^3 (test 0.05 10*3/uL 0.00-0.06                 



             code = 5376141023)                                        

 

             LYMPH x10^3 (test code 1.59 10*3/uL 1.09-3.23                 



             = 731-0)                                            

 

             MONO x10^3 (test code 0.89 10*3/uL 0.36-1.02                 



             = 742-7)                                            

 

             EOS x10^3 (test code = 0.20 10*3/uL 0.06-0.53                 



             711-2)                                              

 

             BASO x10^3 (test code 0.03 10*3/uL 0.01-0.09                 



             = 704-7)                                            

 

             Lab Interpretation Abnormal                               



             (test code = 27552-9)                                        



[facility]2022-06-10 17:25:00





             Test Item    Value        Reference Range Interpretation Comments

 

             POCT GLU (test code = 5060187497) 249 mg/dL           H      

      

 

             Lab Interpretation (test code = Abnormal                           

    



             17553-1)                                            



[facility] GLU[facility]SE (AUTOMATED)2022-06-10 12:54:01





             Test Item    Value        Reference Range Interpretation Comments

 

             POCT GLU (test code = 3520680548) 188 mg/dL           H      

      

 

             Lab Interpretation (test code = Abnormal                           

    



             25323-6)                                            



[facility] METABOLIC PANEL (NA, K, CL, CO2, 
GLUCOSE, BUN, CREATININE, CA)2022-06-10 11:48:23





             Test Item    Value        Reference Range Interpretation Comments

 

             NA (test code = 137 mmol/L   135-145                   



             8789980391)                                         

 

             K (test code = 4.3 mmol/L   3.5-5.0                   



             5275681801)                                         

 

             CL (test code = 105 mmol/L                       



             4060317428)                                         

 

             CO2 TOTAL (test code = 23 mmol/L    23-31                     



             6064651988)                                         

 

             AGAP (test code =              2-16                      



             8037280209)                                         

 

             BUN (test code = 19 mg/dL     7-23                      



             6973200406)                                         

 

             GLUCOSE (test code = 243 mg/dL           H            



             3815977664)                                         

 

             CREATININE (test code = 0.50 mg/dL   0.60-1.25    L            



             2297893049)                                         

 

             CALCIUM (test code = 8.9 mg/dL    8.6-10.6                  



             2770582525)                                         

 

             eGFR (test code =              mL/min/1.73m2              



             8466527659)                                         

 

             MAL (test code = MAL) Association of                           



                          Glomerular Filtration                           



                          Rate (GFR) and Staging                           



                          of Kidney Disease*                           



                          +---------------------                           



                          --+-------------------                           



                          --+-------------------                           



                          ------+| GFR                           



                          (mL/min/1.73 m2) ?|                           



                          With Kidney Damage ?|                           



                          ?Without Kidney                           



                          Damage+---------------                           



                          --------+-------------                           



                          --------+-------------                           



                          ------------+| ?>90 ?                           



                          ? ? ? ? ? ? ? ?|                           



                          ?Stage one ? ? ? ? ?|                           



                          ? Normal ? ? ? ? ? ? ?                           



                          ?+--------------------                           



                          ---+------------------                           



                          ---+------------------                           



                          -------+| ?60-89 ? ? ?                           



                          ? ? ? ? ?| ?Stage two                           



                          ? ? ? ? ?| ? Decreased                           



                          GFR ? ? ? ?                            



                          +---------------------                           



                          --+-------------------                           



                          --+-------------------                           



                          ------+| ?30-59 ? ? ?                           



                          ? ? ? ? ?| ?Stage                           



                          three ? ? ? ?| ? Stage                           



                          three ? ? ? ? ?                           



                          +---------------------                           



                          --+-------------------                           



                          --+-------------------                           



                          ------+| ?15-29 ? ? ?                           



                          ? ? ? ? ?| ?Stage four                           



                          ? ? ? ? | ? Stage four                           



                          ? ? ? ? ?                              



                          ?+--------------------                           



                          ---+------------------                           



                          ---+------------------                           



                          -------+| ?<15 (or                           



                          dialysis) ? ?| ?Stage                           



                          five ? ? ? ? | ? Stage                           



                          five ? ? ? ? ?                           



                          ?+--------------------                           



                          ---+------------------                           



                          ---+------------------                           



                          -------+ *Each stage                           



                          assumes the associated                           



                          GFR level has been in                           



                          effect for at least                           



                          three months. ?Stages                           



                          1 to 5, with or                           



                          without kidney                           



                          disease, indicate                           



                          chronic kidney                           



                          disease. Notes:                           



                          Determination of                           



                          stages one and two                           



                          (with eGFR                             



                          >59mL/min/1.73 m2)                           



                          requires estimation of                           



                          kidney damage for at                           



                          least three months as                           



                          defined by structural                           



                          or functional                           



                          abnormalities of the                           



                          kidney, manifested by                           



                          either:Pathological                           



                          abnormalities or                           



                          Markers of kidney                           



                          damage (including                           



                          abnormalities in the                           



                          composition of the                           



                          blood or urine or                           



                          abnormalities in                           



                          imaging tests).                           

 

             Lab Interpretation Abnormal                               



             (test code = 83004-9)                                        



[facility]





             Test Item    Value        Reference Range Interpretation Comments

 

             MAGNESIUM (test code = 2599271846) 2.1 mg/dL    1.7-2.4            

       

 

             Lab Interpretation (test code = Normal                             

    



             72163-7)                                            



[facility] 11:09:44





             Test Item    Value        Reference Range Interpretation Comments

 

             WBC (test code =              See_Comment                [Automated

 message]



             5690-2)                                             The system [employer]



                                                                 generated this 

result



                                                                 transmitted ref

erence



                                                                 range: 4.20 - 1

0.70



                                                                 10*3/?L. The re

ference



                                                                 range was not u

sed to



                                                                 interpret this 

result



                                                                 as normal/abnor

mal.

 

             RBC (test code =              See_Comment                [Automated

 message]



             359-8)                                              The system [employer]



                                                                 generated this 

result



                                                                 transmitted ref

erence



                                                                 range: 4.26 - 5

.52



                                                                 10*6/?L. The re

ference



                                                                 range was not u

sed to



                                                                 interpret this 

result



                                                                 as normal/abnor

mal.

 

             HGB (test code = 15.9 g/dL    12.2-16.4                 



             718-7)                                              

 

             HCT (test code = 46.8 %       38.4-49.3                 



             4544-3)                                             

 

             MCV (test code = 87.5 fL      81.7-95.6                 



             787-2)                                              

 

             MCH (test code = 29.7 pg      26.1-32.7                 



             785-6)                                              

 

             MCHC (test code = 34.0 g/dL    31.2-35.0                 



             786-4)                                              

 

             RDW-SD (test code 43.6 fL      38.5-51.6                 



             = 54699-3)                                          

 

             RDW-CV (test code 13.7 %       12.1-15.4                 



             = 788-0)                                            

 

             PLT (test code =              See_Comment                [Automated

 message]



             777-3)                                              The system whic

h



                                                                 generated this 

result



                                                                 transmitted ref

erence



                                                                 range: 150 - 32

8



                                                                 10*3/?L. The re

ference



                                                                 range was not u

sed to



                                                                 interpret this 

result



                                                                 as normal/abnor

mal.

 

             MPV (test code = 11.0 fL      9.8-13.0                  



             96059-3)                                            

 

             NRBC/100 WBC (test              See_Comment                [Automat

ed message]



             code = 6549299388)                                        The syste

m which



                                                                 generated this 

result



                                                                 transmitted ref

erence



                                                                 range: 0.0 - 10

.0 /100



                                                                 WBCs. The refer

ence



                                                                 range was not u

sed to



                                                                 interpret this 

result



                                                                 as normal/abnor

mal.

 

             NRBC x10^3 (test <0.01        See_Comment                [Automated

 message]



             code = 5896808666)                                        The syste

m which



                                                                 generated this 

result



                                                                 transmitted ref

erence



                                                                 range: 10*3/?L.

 The



                                                                 reference range

 was not



                                                                 used to interpr

et this



                                                                 result as



                                                                 normal/abnormal

.

 

             GRAN MAT (NEUT) % 57.9 %                                 



             (test code =                                        



             770-8)                                              

 

             IMM GRAN % (test 0.60 %                                 



             code = 1838704404)                                        

 

             LYMPH % (test code 30.7 %                                 



             = 736-9)                                            

 

             MONO % (test code 8.5 %                                  



             = 5905-5)                                           

 

             EOS % (test code = 2.0 %                                  



             713-8)                                              

 

             BASO % (test code 0.3 %                                  



             = 706-2)                                            

 

             GRAN MAT     5.14 10*3/uL 1.99-6.95                 



             x10^3(ANC) (test                                        



             code = 8676093652)                                        

 

             IMM GRAN x10^3 0.05 10*3/uL 0.00-0.06                 



             (test code =                                        



             3873878518)                                         

 

             LYMPH x10^3 (test 2.73 10*3/uL 1.09-3.23                 



             code = 731-0)                                        

 

             MONO x10^3 (test 0.76 10*3/uL 0.36-1.02                 



             code = 742-7)                                        

 

             EOS x10^3 (test 0.18 10*3/uL 0.06-0.53                 



             code = 711-2)                                        

 

             BASO x10^3 (test 0.03 10*3/uL 0.01-0.09                 



             code = 704-7)                                        



[facility]AUT[facility])2022-06-10 10:34:14





             Test Item    Value        Reference Range Interpretation Comments

 

             POCT GLU (test code = 6739394032) 230 mg/dL           H      

      

 

             Lab Interpretation (test code = Abnormal                           

    



             54698-5)                                            



[facility]SE (AUTOMATED)2022-06-10 05:56:50





             Test Item    Value        Reference Range Interpretation Comments

 

             POCT GLU (test code = 2645466935) 314 mg/dL           H      

      

 

             Lab Interpretation (test code = Abnormal                           

    



             90033-3)                                            



[facility] GLUCOSE (AUTOMATED)2022-06-10 04:40:22





             Test Item    Value        Reference Range Interpretation Comments

 

             POCT GLU (test code = 8754659198) 324 mg/dL           H      

      

 

             Lab Interpretation (test code = Abnormal                           

    



             74843-5)                                            



[facility] GLUCOSE (AUTOMATED)2022-06-10 02:37:33





             Test Item    Value        Reference Range Interpretation Comments

 

             POCT GLU (test code = 5425374826) 296 mg/dL           H      

      

 

             Lab Interpretation (test code = Abnormal                           

    



             20460-0)                                            



[facility] GLUCOSE (AUTOMATED)2022-06-10 01:05:02





             Test Item    Value        Reference Range Interpretation Comments

 

             POCT GLU (test code = 1741185978) 319 mg/dL           H      

      

 

             Lab Interpretation (test code = Abnormal                           

    



             12183-5)                                            



[facility] GLUCOSE (AUTOMATED)2022 21:59:09





             Test Item    Value        Reference Range Interpretation Comments

 

             POCT GLU (test code = 8907249738) 257 mg/dL           H      

      

 

             Lab Interpretation (test code = Abnormal                           

    



             78039-6)                                            



[facility] GLUCOSE (AUTOMATED)2022 17:21:41





             Test Item    Value        Reference Range Interpretation Comments

 

             POCT GLU (test code = 2971927952) 214 mg/dL           H      

      

 

             Lab Interpretation (test code = Abnormal                           

    



             95782-8)                                            



[facility] GLUCOSE (AUTOMATED)2022 13:04:04





             Test Item    Value        Reference Range Interpretation Comments

 

             POCT GLU (test code = 2091063996) 234 mg/dL           H      

      

 

             Lab Interpretation (test code = Abnormal                           

    



             31002-9)                                            



[facility] METABOLIC PANEL (NA, K, CL, CO2, 
GLUCOSE, BUN, CREATININE, CA)2022 12:23:33





             Test Item    Value        Reference Range Interpretation Comments

 

             NA (test code = 136 mmol/L   135-145                   



             2763641966)                                         

 

             K (test code = 4.1 mmol/L   3.5-5.0                   



             6499187759)                                         

 

             CL (test code = 109 mmol/L          H            



             1255889498)                                         

 

             CO2 TOTAL (test code = 20 mmol/L    23-31        L            



             1371239748)                                         

 

             AGAP (test code =              2-16                      



             9299975111)                                         

 

             BUN (test code = 16 mg/dL     7-23                      



             0400012260)                                         

 

             GLUCOSE (test code = 281 mg/dL           H            



             1260310871)                                         

 

             CREATININE (test code = 0.50 mg/dL   0.60-1.25    L            



             8865393226)                                         

 

             CALCIUM (test code = 8.3 mg/dL    8.6-10.6     L            



             6583939764)                                         

 

             eGFR (test code =              mL/min/1.73m2              



             9652028154)                                         

 

             MAL (test code = MAL) Association of                           



                          Glomerular Filtration                           



                          Rate (GFR) and Staging                           



                          of Kidney Disease*                           



                          +---------------------                           



                          --+-------------------                           



                          --+-------------------                           



                          ------+| GFR                           



                          (mL/min/1.73 m2) ?|                           



                          With Kidney Damage ?|                           



                          ?Without Kidney                           



                          Damage+---------------                           



                          --------+-------------                           



                          --------+-------------                           



                          ------------+| ?>90 ?                           



                          ? ? ? ? ? ? ? ?|                           



                          ?Stage one ? ? ? ? ?|                           



                          ? Normal ? ? ? ? ? ? ?                           



                          ?+--------------------                           



                          ---+------------------                           



                          ---+------------------                           



                          -------+| ?60-89 ? ? ?                           



                          ? ? ? ? ?| ?Stage two                           



                          ? ? ? ? ?| ? Decreased                           



                          GFR ? ? ? ?                            



                          +---------------------                           



                          --+-------------------                           



                          --+-------------------                           



                          ------+| ?30-59 ? ? ?                           



                          ? ? ? ? ?| ?Stage                           



                          three ? ? ? ?| ? Stage                           



                          three ? ? ? ? ?                           



                          +---------------------                           



                          --+-------------------                           



                          --+-------------------                           



                          ------+| ?15-29 ? ? ?                           



                          ? ? ? ? ?| ?Stage four                           



                          ? ? ? ? | ? Stage four                           



                          ? ? ? ? ?                              



                          ?+--------------------                           



                          ---+------------------                           



                          ---+------------------                           



                          -------+| ?<15 (or                           



                          dialysis) ? ?| ?Stage                           



                          five ? ? ? ? | ? Stage                           



                          five ? ? ? ? ?                           



                          ?+--------------------                           



                          ---+------------------                           



                          ---+------------------                           



                          -------+ *Each stage                           



                          assumes the associated                           



                          GFR level has been in                           



                          effect for at least                           



                          three months. ?Stages                           



                          1 to 5, with or                           



                          without kidney                           



                          disease, indicate                           



                          chronic kidney                           



                          disease. Notes:                           



                          Determination of                           



                          stages one and two                           



                          (with eGFR                             



                          >59mL/min/1.73 m2)                           



                          requires estimation of                           



                          kidney damage for at                           



                          least three months as                           



                          defined by structural                           



                          or functional                           



                          abnormalities of the                           



                          kidney, manifested by                           



                          either:Pathological                           



                          abnormalities or                           



                          Markers of kidney                           



                          damage (including                           



                          abnormalities in the                           



                          composition of the                           



                          blood or urine or                           



                          abnormalities in                           



                          imaging tests).                           

 

             Lab Interpretation Abnormal                               



             (test code = 08514-1)                                        



[facility]





             Test Item    Value        Reference Range Interpretation Comments

 

             MAGNESIUM (test code = 4194977328) 1.6 mg/dL    1.7-2.4      L     

       

 

             Lab Interpretation (test code = Abnormal                           

    



             87139-9)                                            



[facility]





             Test Item    Value        Reference Range Interpretation Comments

 

             PHOSPHORUS (test code = 0669351305) 3.2 mg/dL    2.5-5.0           

        

 

             Lab Interpretation (test code = Normal                             

    



             13756-1)                                            



[facility] 
10:04:37





             Test Item    Value        Reference Range Interpretation Comments

 

             HGB A1C (test code = 9.6 %        4.0-5.7      H            



             4548-4)                                             

 

             MAL (test code = MAL) Reference                              



                          RangesNormal:                           



                          <5.7%Prediabetes:                           



                          5.7 - 6.4%Diabetes:                           



                          > 6.5%                                 

 

             Lab Interpretation (test Abnormal                               



             code = 83609-1)                                        



[facility]41





             Test Item    Value        Reference Range Interpretation Comments

 

             WBC (test code =              See_Comment                [Automated

 message]



             6690-2)                                             The system [employer]



                                                                 generated this 

result



                                                                 transmitted ref

erence



                                                                 range: 4.20 - 1

0.70



                                                                 10*3/?L. The re

ference



                                                                 range was not u

sed to



                                                                 interpret this 

result



                                                                 as normal/abnor

mal.

 

             RBC (test code =              See_Comment                [Automated

 message]



             789-8)                                              The system [employer]



                                                                 generated this 

result



                                                                 transmitted ref

erence



                                                                 range: 4.26 - 5

.52



                                                                 10*6/?L. The re

ference



                                                                 range was not u

sed to



                                                                 interpret this 

result



                                                                 as normal/abnor

mal.

 

             HGB (test code = 14.7 g/dL    12.2-16.4                 



             718-7)                                              

 

             HCT (test code = 43.3 %       38.4-49.3                 



             4544-3)                                             

 

             MCV (test code = 86.9 fL      81.7-95.6                 



             787-2)                                              

 

             MCH (test code = 29.5 pg      26.1-32.7                 



             785-6)                                              

 

             MCHC (test code = 33.9 g/dL    31.2-35.0                 



             786-4)                                              

 

             RDW-SD (test code 42.5 fL      38.5-51.6                 



             = 72405-5)                                          

 

             RDW-CV (test code 13.5 %       12.1-15.4                 



             = 788-0)                                            

 

             PLT (test code =              See_Comment                [Automated

 message]



             777-3)                                              The system whic

h



                                                                 generated this 

result



                                                                 transmitted ref

erence



                                                                 range: 150 - 32

8



                                                                 10*3/?L. The re

ference



                                                                 range was not u

sed to



                                                                 interpret this 

result



                                                                 as normal/abnor

mal.

 

             MPV (test code = 10.8 fL      9.8-13.0                  



             80170-8)                                            

 

             NRBC/100 WBC (test              See_Comment                [Automat

ed message]



             code = 4466808147)                                        The syste

m which



                                                                 generated this 

result



                                                                 transmitted ref

erence



                                                                 range: 0.0 - 10

.0 /100



                                                                 WBCs. The refer

ence



                                                                 range was not u

sed to



                                                                 interpret this 

result



                                                                 as normal/abnor

mal.

 

             NRBC x10^3 (test <0.01        See_Comment                [Automated

 message]



             code = 1438919860)                                        The syste

m which



                                                                 generated this 

result



                                                                 transmitted ref

erence



                                                                 range: 10*3/?L.

 The



                                                                 reference range

 was not



                                                                 used to interpr

et this



                                                                 result as



                                                                 normal/abnormal

.

 

             GRAN MAT (NEUT) % 58.6 %                                 



             (test code =                                        



             770-8)                                              

 

             IMM GRAN % (test 0.50 %                                 



             code = 0198275160)                                        

 

             LYMPH % (test code 31.3 %                                 



             = 736-9)                                            

 

             MONO % (test code 6.9 %                                  



             = 5905-5)                                           

 

             EOS % (test code = 2.4 %                                  



             713-8)                                              

 

             BASO % (test code 0.3 %                                  



             = 706-2)                                            

 

             GRAN MAT     5.39 10*3/uL 1.99-6.95                 



             x10^3(ANC) (test                                        



             code = 7460026720)                                        

 

             IMM GRAN x10^3 0.05 10*3/uL 0.00-0.06                 



             (test code =                                        



             4956833832)                                         

 

             LYMPH x10^3 (test 2.89 10*3/uL 1.09-3.23                 



             code = 731-0)                                        

 

             MONO x10^3 (test 0.64 10*3/uL 0.36-1.02                 



             code = 742-7)                                        

 

             EOS x10^3 (test 0.22 10*3/uL 0.06-0.53                 



             code = 711-2)                                        

 

             BASO x10^3 (test 0.03 10*3/uL 0.01-0.09                 



             code = 704-7)                                        



[facility] GLU[facility]SE (AUTOMATED)2022 09:06:33





             Test Item    Value        Reference Range Interpretation Comments

 

             POCT GLU (test code = 6188699560) 206 mg/dL           H      

      

 

             Lab Interpretation (test code = Abnormal                           

    



             74165-8)                                            



[facility] GLU[facility]SE (AUTOMATED)2022 04:38:41





             Test Item    Value        Reference Range Interpretation Comments

 

             POCT GLU (test code = 3187470673) 272 mg/dL           H      

      

 

             Lab Interpretation (test code = Abnormal                           

    



             13890-0)                                            



[facility] GLUCOSE (AUTOMATED)2022 01:14:47





             Test Item    Value        Reference Range Interpretation Comments

 

             POCT GLU (test code = 8011749776) 256 mg/dL           H      

      

 

             Lab Interpretation (test code = Abnormal                           

    



             32300-7)                                            



[facility] GLUCOSE (AUTOMATED)2022 21:11:19





             Test Item    Value        Reference Range Interpretation Comments

 

             POCT GLU (test code = 5072593483) 254 mg/dL           H      

      

 

             Lab Interpretation (test code = Abnormal                           

    



             93557-0)                                            



[facility] GLUCOSE (AUTOMATED)2022 17:10:33





             Test Item    Value        Reference Range Interpretation Comments

 

             POCT GLU (test code = 6266413596) 350 mg/dL           H      

      

 

             Lab Interpretation (test code = Abnormal                           

    



             63135-1)                                            



[facility] GLUCOSE (AUTOMATED)2022 12:50:18





             Test Item    Value        Reference Range Interpretation Comments

 

             POCT GLU (test code = [id number]) 269 mg/dL           H      

      

 

             Lab Interpretation (test code = Abnormal                           

    



             91552-3)                                            



[facility] Metabolic Panel (NA, K, CL, CO2, 
GLUCOSE, BUN, CREATININE, CA)2022 10:18:27





             Test Item    Value        Reference Range Interpretation Comments

 

             NA (test code = 137 mmol/L   135-145                   



             0300125084)                                         

 

             K (test code = 4.0 mmol/L   3.5-5.0                   



             7911979048)                                         

 

             CL (test code = 108 mmol/L                       



             6094055327)                                         

 

             CO2 TOTAL (test code = 23 mmol/L    23-31                     



             3397683335)                                         

 

             AGAP (test code =              2-16                      



             6482992799)                                         

 

             BUN (test code = 16 mg/dL     7-23                      



             1152183922)                                         

 

             GLUCOSE (test code = 386 mg/dL           H            



             3035991951)                                         

 

             CREATININE (test code = 0.70 mg/dL   0.60-1.25                 



             2680871837)                                         

 

             CALCIUM (test code = 8.6 mg/dL    8.6-10.6                  



             0020168502)                                         

 

             eGFR (test code =              mL/min/1.73m2              



             0304181721)                                         

 

             MAL (test code = MAL) Association of                           



                          Glomerular Filtration                           



                          Rate (GFR) and Staging                           



                          of Kidney Disease*                           



                          +---------------------                           



                          --+-------------------                           



                          --+-------------------                           



                          ------+| GFR                           



                          (mL/min/1.73 m2) ?|                           



                          With Kidney Damage ?|                           



                          ?Without Kidney                           



                          Damage+---------------                           



                          --------+-------------                           



                          --------+-------------                           



                          ------------+| ?>90 ?                           



                          ? ? ? ? ? ? ? ?|                           



                          ?Stage one ? ? ? ? ?|                           



                          ? Normal ? ? ? ? ? ? ?                           



                          ?+--------------------                           



                          ---+------------------                           



                          ---+------------------                           



                          -------+| ?60-89 ? ? ?                           



                          ? ? ? ? ?| ?Stage two                           



                          ? ? ? ? ?| ? Decreased                           



                          GFR ? ? ? ?                            



                          +---------------------                           



                          --+-------------------                           



                          --+-------------------                           



                          ------+| ?30-59 ? ? ?                           



                          ? ? ? ? ?| ?Stage                           



                          three ? ? ? ?| ? Stage                           



                          three ? ? ? ? ?                           



                          +---------------------                           



                          --+-------------------                           



                          --+-------------------                           



                          ------+| ?15-29 ? ? ?                           



                          ? ? ? ? ?| ?Stage four                           



                          ? ? ? ? | ? Stage four                           



                          ? ? ? ? ?                              



                          ?+--------------------                           



                          ---+------------------                           



                          ---+------------------                           



                          -------+| ?<15 (or                           



                          dialysis) ? ?| ?Stage                           



                          five ? ? ? ? | ? Stage                           



                          five ? ? ? ? ?                           



                          ?+--------------------                           



                          ---+------------------                           



                          ---+------------------                           



                          -------+ *Each stage                           



                          assumes the associated                           



                          GFR level has been in                           



                          effect for at least                           



                          three months. ?Stages                           



                          1 to 5, with or                           



                          without kidney                           



                          disease, indicate                           



                          chronic kidney                           



                          disease. Notes:                           



                          Determination of                           



                          stages one and two                           



                          (with eGFR                             



                          >59mL/min/1.73 m2)                           



                          requires estimation of                           



                          kidney damage for at                           



                          least three months as                           



                          defined by structural                           



                          or functional                           



                          abnormalities of the                           



                          kidney, manifested by                           



                          either:Pathological                           



                          abnormalities or                           



                          Markers of kidney                           



                          damage (including                           



                          abnormalities in the                           



                          composition of the                           



                          blood or urine or                           



                          abnormalities in                           



                          imaging tests).                           

 

             Lab Interpretation Abnormal                               



             (test code = 47559-5)                                        



[facility] with [id number] 10:03:31





             Test Item    Value        Reference Range Interpretation Comments

 

             WBC (test code =              See_Comment                [Automated

 message]



             6690-2)                                             The system [employer]



                                                                 generated this 

result



                                                                 transmitted ref

erence



                                                                 range: 4.20 - 1

0.70



                                                                 10*3/?L. The re

ference



                                                                 range was not u

sed to



                                                                 interpret this 

result



                                                                 as normal/abnor

mal.

 

             RBC (test code =              See_Comment                [Automated

 message]



             789-8)                                              The system [employer]



                                                                 generated this 

result



                                                                 transmitted ref

erence



                                                                 range: 4.26 - 5

.52



                                                                 10*6/?L. The re

ference



                                                                 range was not u

sed to



                                                                 interpret this 

result



                                                                 as normal/abnor

mal.

 

             HGB (test code = 15.5 g/dL    12.2-16.4                 



             718-7)                                              

 

             HCT (test code = 45.0 %       38.4-49.3                 



             4544-3)                                             

 

             MCV (test code = 85.9 fL      81.7-95.6                 



             787-2)                                              

 

             MCH (test code = 29.6 pg      26.1-32.7                 



             785-6)                                              

 

             MCHC (test code = 34.4 g/dL    31.2-35.0                 



             786-4)                                              

 

             RDW-SD (test code 42.5 fL      38.5-51.6                 



             = 89581-1)                                          

 

             RDW-CV (test code 13.5 %       12.1-15.4                 



             = 788-0)                                            

 

             PLT (test code =              See_Comment                [Automated

 message]



             777-3)                                              The system [employer]



                                                                 generated this 

result



                                                                 transmitted ref

erence



                                                                 range: 150 - 32

8



                                                                 10*3/?L. The re

ference



                                                                 range was not u

sed to



                                                                 interpret this 

result



                                                                 as normal/abnor

mal.

 

             MPV (test code = 11.2 fL      9.8-13.0                  



             55716-7)                                            

 

             NRBC/100 WBC (test              See_Comment                [Automat

ed message]



             code = 2827378901)                                        The syste

[employer] which



                                                                 generated this 

result



                                                                 transmitted ref

erence



                                                                 range: 0.0 - 10

.0 /100



                                                                 WBCs. The refer

ence



                                                                 range was not u

sed to



                                                                 interpret this 

result



                                                                 as normal/abnor

mal.

 

             NRBC x10^3 (test <0.01        See_Comment                [Automated

 message]



             code = 4243229623)                                        The syste

m which



                                                                 generated this 

result



                                                                 transmitted ref

erence



                                                                 range: 10*3/?L.

 The



                                                                 reference range

 was not



                                                                 used to interpr

et this



                                                                 result as



                                                                 normal/abnormal

.

 

             GRAN MAT (NEUT) % 63.5 %                                 



             (test code =                                        



             770-8)                                              

 

             IMM GRAN % (test 0.30 %                                 



             code = 3188575266)                                        

 

             LYMPH % (test code 24.8 %                                 



             = 736-9)                                            

 

             MONO % (test code 8.7 %                                  



             = 5905-5)                                           

 

             EOS % (test code = 2.5 %                                  



             713-8)                                              

 

             BASO % (test code 0.2 %                                  



             = 706-2)                                            

 

             GRAN MAT     6.05 10*3/uL 1.99-6.95                 



             x10^3(ANC) (test                                        



             code = 7558244892)                                        

 

             IMM GRAN x10^3 0.03 10*3/uL 0.00-0.06                 



             (test code =                                        



             9473701655)                                         

 

             LYMPH x10^3 (test 2.37 10*3/uL 1.09-3.23                 



             code = 731-0)                                        

 

             MONO x10^3 (test 0.83 10*3/uL 0.36-1.02                 



             code = 742-7)                                        

 

             EOS x10^3 (test 0.24 10*3/uL 0.06-0.53                 



             code = 711-2)                                        

 

             BASO x10^3 (test <0.03        0.01-0.09                 



             code = 704-7)                                        



[facility] GLU[facility] (AUTOMATED)2022 09:00:15





             Test Item    Value        Reference Range Interpretation Comments

 

             POCT GLU (test code = 2403492340) 402 mg/dL           H      

      

 

             Lab Interpretation (test code = Abnormal                           

    



             45683-2)                                            



[facility] GLUCOSE (AUTOMATED)2022 04:54:36





             Test Item    Value        Reference Range Interpretation Comments

 

             POCT GLU (test code = 3331296204) 368 mg/dL           H      

      

 

             Lab Interpretation (test code = Abnormal                           

    



             39876-0)                                            



[facility] GLUCOSE (AUTOMATED)2022 03:13:40





             Test Item    Value        Reference Range Interpretation Comments

 

             POCT GLU (test code = 2866457973) 438 mg/dL           H      

      

 

             Lab Interpretation (test code = Abnormal                           

    



             22393-8)                                            



[facility]. METABOLIC PANEL (42596)2022 
01:05:39





             Test Item    Value        Reference Range Interpretation Comments

 

             NA (test code = 133 mmol/L   135-145      L            



             0030549526)                                         

 

             K (test code = 4.7 mmol/L   3.5-5.0                   



             0354806707)                                         

 

             CL (test code = 99 mmol/L                        



             9131355091)                                         

 

             CO2 TOTAL (test code = 21 mmol/L    23-31        L            



             2943897643)                                         

 

             AGAP (test code =              2-16                      



             9126160425)                                         

 

             BUN (test code = 18 mg/dL     7-23                      



             6424465605)                                         

 

             GLUCOSE (test code = 660 mg/dL           HH           



             4378017935)                                         

 

             CREATININE (test code = 0.64 mg/dL   0.60-1.25                 



             6282570383)                                         

 

             TOTAL BILI (test code = 1.0 mg/dL    0.1-1.1                   



             5408305520)                                         

 

             CALCIUM (test code = 9.7 mg/dL    8.6-10.6                  



             1195011363)                                         

 

             T PROTEIN (test code = 7.9 g/dL     6.3-8.2                   



             0898587963)                                         

 

             ALBUMIN (test code = 4.4 g/dL     3.5-5.0                   



             6788470737)                                         

 

             ALK PHOS (test code = 143 U/L             H            



             3063897971)                                         

 

             ALTv (test code = 47 U/L       5-50                      



             1742-6)                                             

 

             AST(SGOT) (test code = 30 U/L       13-40                     



             9397903557)                                         

 

             eGFR (test code =              mL/min/1.73m2              



             6568227620)                                         

 

             MAL (test code = MAL) Association of                           



                          Glomerular Filtration                           



                          Rate (GFR) and Staging                           



                          of Kidney Disease*                           



                          +---------------------                           



                          --+-------------------                           



                          --+-------------------                           



                          ------+| GFR                           



                          (mL/min/1.73 m2) ?|                           



                          With Kidney Damage ?|                           



                          ?Without Kidney                           



                          Damage+---------------                           



                          --------+-------------                           



                          --------+-------------                           



                          ------------+| ?>90 ?                           



                          ? ? ? ? ? ? ? ?|                           



                          ?Stage one ? ? ? ? ?|                           



                          ? Normal ? ? ? ? ? ? ?                           



                          ?+--------------------                           



                          ---+------------------                           



                          ---+------------------                           



                          -------+| ?60-89 ? ? ?                           



                          ? ? ? ? ?| ?Stage two                           



                          ? ? ? ? ?| ? Decreased                           



                          GFR ? ? ? ?                            



                          +---------------------                           



                          --+-------------------                           



                          --+-------------------                           



                          ------+| ?30-59 ? ? ?                           



                          ? ? ? ? ?| ?Stage                           



                          three ? ? ? ?| ? Stage                           



                          three ? ? ? ? ?                           



                          +---------------------                           



                          --+-------------------                           



                          --+-------------------                           



                          ------+| ?15-29 ? ? ?                           



                          ? ? ? ? ?| ?Stage four                           



                          ? ? ? ? | ? Stage four                           



                          ? ? ? ? ?                              



                          ?+--------------------                           



                          ---+------------------                           



                          ---+------------------                           



                          -------+| ?<15 (or                           



                          dialysis) ? ?| ?Stage                           



                          five ? ? ? ? | ? Stage                           



                          five ? ? ? ? ?                           



                          ?+--------------------                           



                          ---+------------------                           



                          ---+------------------                           



                          -------+ *Each stage                           



                          assumes the associated                           



                          GFR level has been in                           



                          effect for at least                           



                          three months. ?Stages                           



                          1 to 5, with or                           



                          without kidney                           



                          disease, indicate                           



                          chronic kidney                           



                          disease. Notes:                           



                          Determination of                           



                          stages one and two                           



                          (with eGFR                             



                          >59mL/min/1.73 m2)                           



                          requires estimation of                           



                          kidney damage for at                           



                          least three months as                           



                          defined by structural                           



                          or functional                           



                          abnormalities of the                           



                          kidney, manifested by                           



                          either:Pathological                           



                          abnormalities or                           



                          Markers of kidney                           



                          damage (including                           



                          abnormalities in the                           



                          composition of the                           



                          blood or urine or                           



                          abnormalities in                           



                          imaging tests).                           

 

             Lab Interpretation Abnormal                               



             (test code = 33111-1)                                        



[facility]





             Test Item    Value        Reference Range Interpretation Comments

 

             LIPASE (test code = 6091147863) 255 U/L      0-220        H        

    

 

             Lab Interpretation (test code = Abnormal                           

    



             54596-7)                                            



[id number] 00:38:14





             Test Item    Value        Reference Range Interpretation Comments

 

             WBC (test code =              See_Comment  H             [Automated



             6690-2)                                             message] The sy

stem



                                                                 which generated



                                                                 this result



                                                                 transmitted



                                                                 reference range

:



                                                                 4.20 - 10.70



                                                                 10*3/?L. The



                                                                 reference range

 was



                                                                 not used to



                                                                 interpret this



                                                                 result as



                                                                 normal/abnormal

.

 

             RBC (test code =              See_Comment  H             [Automated



             249-8)                                              message] The sy

stem



                                                                 which generated



                                                                 this result



                                                                 transmitted



                                                                 reference range

:



                                                                 4.26 - 5.52



                                                                 10*6/?L. The



                                                                 reference range

 was



                                                                 not used to



                                                                 interpret this



                                                                 result as



                                                                 normal/abnormal

.

 

             HGB (test code = 16.5 g/dL    12.2-16.4    H            



             718-7)                                              

 

             HCT (test code = 48.9 %       38.4-49.3                 



             4544-3)                                             

 

             MCV (test code = 87.2 fL      81.7-95.6                 



             787-2)                                              

 

             MCH (test code = 29.4 pg      26.1-32.7                 



             785-6)                                              

 

             MCHC (test code = 33.7 g/dL    31.2-35.0                 



             786-4)                                              

 

             RDW-SD (test code = 43.8 fL      38.5-51.6                 



             16131-7)                                            

 

             RDW-CV (test code = 13.6 %       12.1-15.4                 



             788-0)                                              

 

             PLT (test code =              See_Comment                [Automated



             777-3)                                              message] The sy

stem



                                                                 which generated



                                                                 this result



                                                                 transmitted



                                                                 reference range

:



                                                                 150 - 328 10*3/

?L.



                                                                 The reference r

zhen



                                                                 was not used to



                                                                 interpret this



                                                                 result as



                                                                 normal/abnormal

.

 

             MPV (test code = 11.4 fL      9.8-13.0                  



             18748-7)                                            

 

             NRBC/100 WBC (test              See_Comment                [Automat

ed



             code = 0265153260)                                        message] 

The system



                                                                 which generated



                                                                 this result



                                                                 transmitted



                                                                 reference range

:



                                                                 0.0 - 10.0 /100



                                                                 WBCs. The refer

ence



                                                                 range was not u

sed



                                                                 to interpret th

is



                                                                 result as



                                                                 normal/abnormal

.

 

             NRBC x10^3 (test code <0.01        See_Comment                [Auto

mated



             = 1090880804)                                        message] The s

ystem



                                                                 which generated



                                                                 this result



                                                                 transmitted



                                                                 reference range

:



                                                                 10*3/?L. The



                                                                 reference range

 was



                                                                 not used to



                                                                 interpret this



                                                                 result as



                                                                 normal/abnormal

.

 

             GRAN MAT (NEUT) % 76.0 %                                 



             (test code = 770-8)                                        

 

             IMM GRAN % (test code 0.50 %                                 



             = 4379698242)                                        

 

             LYMPH % (test code = 15.2 %                                 



             736-9)                                              

 

             MONO % (test code = 6.5 %                                  



             5905-5)                                             

 

             EOS % (test code = 1.6 %                                  



             713-8)                                              

 

             BASO % (test code = 0.2 %                                  



             706-2)                                              

 

             GRAN MAT x10^3(ANC) 8.55 10*3/uL 1.99-6.95    H            



             (test code =                                        



             0454655019)                                         

 

             IMM GRAN x10^3 (test 0.06 10*3/uL 0.00-0.06                 



             code = 7315535267)                                        

 

             LYMPH x10^3 (test code 1.71 10*3/uL 1.09-3.23                 



             = 731-0)                                            

 

             MONO x10^3 (test code 0.73 10*3/uL 0.36-1.02                 



             = 742-7)                                            

 

             EOS x10^3 (test code = 0.18 10*3/uL 0.06-0.53                 



             711-2)                                              

 

             BASO x10^3 (test code <0.03        0.01-0.09                 



             = 704-7)                                            

 

             Lab Interpretation Abnormal                               



             (test code = 71311-3)                                        



[facility]





             Test Item    Value        Reference Range Interpretation Comments

 

             MAGNESIUM (test code = 0569033872) 1.7 mg/dL    1.7-2.4            

       

 

             Lab Interpretation (test code = Normal                             

    



             38085-6)                                            



[facility] METABOLIC PANEL (NA, K, CL, CO2, 
GLUCOSE, BUN, CREATININE, CA)2022 18:32:38





             Test Item    Value        Reference Range Interpretation Comments

 

             NA (test code = 137 mmol/L   135-145                   



             8462926056)                                         

 

             K (test code = 4.3 mmol/L   3.5-5.0                   



             7673592974)                                         

 

             CL (test code = 105 mmol/L                       



             2048526841)                                         

 

             CO2 TOTAL (test code = 23 mmol/L    23-31                     



             3301903030)                                         

 

             AGAP (test code =              2-16                      



             0498209254)                                         

 

             BUN (test code = 17 mg/dL     7-23                      



             5553821868)                                         

 

             GLUCOSE (test code = 317 mg/dL           H            



             9061815843)                                         

 

             CREATININE (test code = 0.57 mg/dL   0.60-1.25    L            



             5272236237)                                         

 

             CALCIUM (test code = 9.3 mg/dL    8.6-10.6                  



             3918200058)                                         

 

             eGFR (test code =              mL/min/1.73m2              



             0102194420)                                         

 

             MAL (test code = MAL) Association of                           



                          Glomerular Filtration                           



                          Rate (GFR) and Staging                           



                          of Kidney Disease*                           



                          +---------------------                           



                          --+-------------------                           



                          --+-------------------                           



                          ------+| GFR                           



                          (mL/min/1.73 m2) ?|                           



                          With Kidney Damage ?|                           



                          ?Without Kidney                           



                          Damage+---------------                           



                          --------+-------------                           



                          --------+-------------                           



                          ------------+| ?>90 ?                           



                          ? ? ? ? ? ? ? ?|                           



                          ?Stage one ? ? ? ? ?|                           



                          ? Normal ? ? ? ? ? ? ?                           



                          ?+--------------------                           



                          ---+------------------                           



                          ---+------------------                           



                          -------+| ?60-89 ? ? ?                           



                          ? ? ? ? ?| ?Stage two                           



                          ? ? ? ? ?| ? Decreased                           



                          GFR ? ? ? ?                            



                          +---------------------                           



                          --+-------------------                           



                          --+-------------------                           



                          ------+| ?30-59 ? ? ?                           



                          ? ? ? ? ?| ?Stage                           



                          three ? ? ? ?| ? Stage                           



                          three ? ? ? ? ?                           



                          +---------------------                           



                          --+-------------------                           



                          --+-------------------                           



                          ------+| ?15-29 ? ? ?                           



                          ? ? ? ? ?| ?Stage four                           



                          ? ? ? ? | ? Stage four                           



                          ? ? ? ? ?                              



                          ?+--------------------                           



                          ---+------------------                           



                          ---+------------------                           



                          -------+| ?<15 (or                           



                          dialysis) ? ?| ?Stage                           



                          five ? ? ? ? | ? Stage                           



                          five ? ? ? ? ?                           



                          ?+--------------------                           



                          ---+------------------                           



                          ---+------------------                           



                          -------+ *Each stage                           



                          assumes the associated                           



                          GFR level has been in                           



                          effect for at least                           



                          three months. ?Stages                           



                          1 to 5, with or                           



                          without kidney                           



                          disease, indicate                           



                          chronic kidney                           



                          disease. Notes:                           



                          Determination of                           



                          stages one and two                           



                          (with eGFR                             



                          >59mL/min/1.73 m2)                           



                          requires estimation of                           



                          kidney damage for at                           



                          least three months as                           



                          defined by structural                           



                          or functional                           



                          abnormalities of the                           



                          kidney, manifested by                           



                          either:Pathological                           



                          abnormalities or                           



                          Markers of kidney                           



                          damage (including                           



                          abnormalities in the                           



                          composition of the                           



                          blood or urine or                           



                          abnormalities in                           



                          imaging tests).                           

 

             Lab Interpretation Abnormal                               



             (test code = 49297-1)                                        



[facility] WITH [id number] 18:10:18





             Test Item    Value        Reference Range Interpretation Comments

 

             WBC (test code =              See_Comment                [Automated

 message]



             7390-2)                                             The system [employer]



                                                                 generated this 

result



                                                                 transmitted ref

erence



                                                                 range: 4.20 - 1

0.70



                                                                 10*3/?L. The re

ference



                                                                 range was not u

sed to



                                                                 interpret this 

result



                                                                 as normal/abnor

mal.

 

             RBC (test code =              See_Comment                [Automated

 message]



             569-8)                                              The system [employer]



                                                                 generated this 

result



                                                                 transmitted ref

erence



                                                                 range: 4.26 - 5

.52



                                                                 10*6/?L. The re

ference



                                                                 range was not u

sed to



                                                                 interpret this 

result



                                                                 as normal/abnor

mal.

 

             HGB (test code = 16.0 g/dL    12.2-16.4                 



             718-7)                                              

 

             HCT (test code = 47.3 %       38.4-49.3                 



             4544-3)                                             

 

             MCV (test code = 87.1 fL      81.7-95.6                 



             787-2)                                              

 

             MCH (test code = 29.5 pg      26.1-32.7                 



             785-6)                                              

 

             MCHC (test code = 33.8 g/dL    31.2-35.0                 



             786-4)                                              

 

             RDW-SD (test code 44.4 fL      38.5-51.6                 



             = 19664-4)                                          

 

             RDW-CV (test code 13.8 %       12.1-15.4                 



             = 788-0)                                            

 

             PLT (test code =              See_Comment                [Automated

 message]



             527-3)                                              The system [employer]



                                                                 generated this 

result



                                                                 transmitted ref

erence



                                                                 range: 150 - 32

8



                                                                 10*3/?L. The re

ference



                                                                 range was not u

sed to



                                                                 interpret this 

result



                                                                 as normal/abnor

mal.

 

             MPV (test code = 10.9 fL      9.8-13.0                  



             59850-2)                                            

 

             NRBC/100 WBC (test              See_Comment                [Automat

ed message]



             code = 9037507641)                                        The syste

m which



                                                                 generated this 

result



                                                                 transmitted ref

erence



                                                                 range: 0.0 - 10

.0 /100



                                                                 WBCs. The refer

ence



                                                                 range was not u

sed to



                                                                 interpret this 

result



                                                                 as normal/abnor

mal.

 

             NRBC x10^3 (test <0.01        See_Comment                [Automated

 message]



             code = 3293240894)                                        The syste

m which



                                                                 generated this 

result



                                                                 transmitted ref

erence



                                                                 range: 10*3/?L.

 The



                                                                 reference range

 was not



                                                                 used to interpr

et this



                                                                 result as



                                                                 normal/abnormal

.

 

             GRAN MAT (NEUT) % 53.4 %                                 



             (test code =                                        



             770-8)                                              

 

             IMM GRAN % (test 0.30 %                                 



             code = 5916056248)                                        

 

             LYMPH % (test code 35.3 %                                 



             = 736-9)                                            

 

             MONO % (test code 7.4 %                                  



             = 5905-5)                                           

 

             EOS % (test code = 3.3 %                                  



             713-8)                                              

 

             BASO % (test code 0.3 %                                  



             = 706-2)                                            

 

             GRAN MAT     3.59 10*3/uL 1.99-6.95                 



             x10^3(ANC) (test                                        



             code = 0243670034)                                        

 

             IMM GRAN x10^3 <0.03        0.00-0.06                 



             (test code =                                        



             2572836459)                                         

 

             LYMPH x10^3 (test 2.37 10*3/uL 1.09-3.23                 



             code = 731-0)                                        

 

             MONO x10^3 (test 0.50 10*3/uL 0.36-1.02                 



             code = 742-7)                                        

 

             EOS x10^3 (test 0.22 10*3/uL 0.06-0.53                 



             code = 711-2)                                        

 

             BASO x10^3 (test <0.03        0.01-0.09                 



             code = 704-7)                                        



[facility] GLUCOSE (AUTOMATED)2022 16:50:23





             Test Item    Value        Reference Range Interpretation Comments

 

             POCT GLU (test code = 9857269047) 304 mg/dL           H      

      

 

             Lab Interpretation (test code = Abnormal                           

    



             01145-0)                                            



[facility] GLUCOSE (AUTOMATED)2022 12:58:13





             Test Item    Value        Reference Range Interpretation Comments

 

             POCT GLU (test code = 3254070507) 364 mg/dL           H      

      

 

             Lab Interpretation (test code = Abnormal                           

    



             88439-6)                                            



[facility] acute hospital GLUCOSE (AUTOMATED)2022 09:42:05





             Test Item    Value        Reference Range Interpretation Comments

 

             POCT GLU (test code = 2363868831) 369 mg/dL           H      

      

 

             Lab Interpretation (test code = Abnormal                           

    



             06325-7)                                            



[facility] acute hospital GLUCOSE (AUTOMATED)2022 05:09:33





             Test Item    Value        Reference Range Interpretation Comments

 

             POCT GLU (test code = 1075009613) 332 mg/dL           H      

      

 

             Lab Interpretation (test code = Abnormal                           

    



             23220-6)                                            



[facility] acute hospital GLUCOSE (AUTOMATED)2022 01:27:31





             Test Item    Value        Reference Range Interpretation Comments

 

             POCT GLU (test code = 0578986235) 349 mg/dL           H      

      

 

             Lab Interpretation (test code = Abnormal                           

    



             27691-6)                                            



[facility] acute hospital GLUCOSE (AUTOMATED)2022 21:42:26





             Test Item    Value        Reference Range Interpretation Comments

 

             POCT GLU (test code = 8847504599) 372 mg/dL           H      

      

 

             Lab Interpretation (test code = Abnormal                           

    



             13515-4)                                            



[facility] hospital GLUCOSE (AUTOMATED)2022 17:17:16





             Test Item    Value        Reference Range Interpretation Comments

 

             POCT GLU (test code = 6584121041) 329 mg/dL           H      

      

 

             Lab Interpretation (test code = Abnormal                           

    



             97658-3)                                            



[facility] acute hospital GLUCOSE (AUTOMATED)2022 14:09:34





             Test Item    Value        Reference Range Interpretation Comments

 

             POCT GLU (test code = 5601014036) 350 mg/dL           H      

      

 

             Lab Interpretation (test code = Abnormal                           

    



             41047-6)                                            



[facility] acute hospital GLUCOSE (AUTOMATED)2022 09:59:20





             Test Item    Value        Reference Range Interpretation Comments

 

             POCT GLU (test code = 2275497455) 342 mg/dL           H      

      

 

             Lab Interpretation (test code = Abnormal                           

    



             61134-6)                                            



[facility] acute hospital GLUCOSE (AUTOMATED)2022 01:46:05





             Test Item    Value        Reference Range Interpretation Comments

 

             POCT GLU (test code = 1981579603) 318 mg/dL           H      

      

 

             Lab Interpretation (test code = Abnormal                           

    



             25016-2)                                            



[facility] acute hospital GLUCOSE (AUTOMATED)2022 21:25:33





             Test Item    Value        Reference Range Interpretation Comments

 

             POCT GLU (test code = 7729152763) 212 mg/dL           H      

      

 

             Lab Interpretation (test code = Abnormal                           

    



             22642-4)                                            



[facility] acute hospital GLUCOSE (AUTOMATED)2022 16:27:52





             Test Item    Value        Reference Range Interpretation Comments

 

             POCT GLU (test code = 7784754789) 226 mg/dL           H      

      

 

             Lab Interpretation (test code = Abnormal                           

    



             56221-6)                                            



[facility] acute hospital GLUCOSE (AUTOMATED)2022 15:37:46





             Test Item    Value        Reference Range Interpretation Comments

 

             POCT GLU (test code = 5883723707) 204 mg/dL           H      

      

 

             Lab Interpretation (test code = Abnormal                           

    



             28807-2)                                            



[facility] acute hospital GLUCOSE (AUTOMATED)2022 14:39:25





             Test Item    Value        Reference Range Interpretation Comments

 

             POCT GLU (test code = 5397162904) 185 mg/dL           H      

      

 

             Lab Interpretation (test code = Abnormal                           

    



             79405-7)                                            



[facility] Metabolic Panel (Na, K, Cl, CO2, 
Glucose, BUN, Creatinine, Ca)2022 14:07:03





             Test Item    Value        Reference Range Interpretation Comments

 

             NA (test code = 137 mmol/L   135-145                   



             8824333131)                                         

 

             K (test code = 4.6 mmol/L   3.5-5.0                   



             5287548677)                                         

 

             CL (test code = 111 mmol/L          H            



             1916859841)                                         

 

             CO2 TOTAL (test code = 22 mmol/L    23-31        L            



             5422651275)                                         

 

             AGAP (test code =              2-16                      



             6699274972)                                         

 

             BUN (test code = 12 mg/dL     7-23                      



             2531210227)                                         

 

             GLUCOSE (test code = 181 mg/dL           H            



             0327268103)                                         

 

             CREATININE (test code = 0.70 mg/dL   0.60-1.25                 



             6815974948)                                         

 

             CALCIUM (test code = 8.3 mg/dL    8.6-10.6     L            



             5473178911)                                         

 

             eGFR (test code =              mL/min/1.73m2              



             5332046836)                                         

 

             MAL (test code = MAL) Association of                           



                          Glomerular Filtration                           



                          Rate (GFR) and Staging                           



                          of Kidney Disease*                           



                          +---------------------                           



                          --+-------------------                           



                          --+-------------------                           



                          ------+| GFR                           



                          (mL/min/1.73 m2) ?|                           



                          With Kidney Damage ?|                           



                          ?Without Kidney                           



                          Damage+---------------                           



                          --------+-------------                           



                          --------+-------------                           



                          ------------+| ?>90 ?                           



                          ? ? ? ? ? ? ? ?|                           



                          ?Stage one ? ? ? ? ?|                           



                          ? Normal ? ? ? ? ? ? ?                           



                          ?+--------------------                           



                          ---+------------------                           



                          ---+------------------                           



                          -------+| ?60-89 ? ? ?                           



                          ? ? ? ? ?| ?Stage two                           



                          ? ? ? ? ?| ? Decreased                           



                          GFR ? ? ? ?                            



                          +---------------------                           



                          --+-------------------                           



                          --+-------------------                           



                          ------+| ?30-59 ? ? ?                           



                          ? ? ? ? ?| ?Stage                           



                          three ? ? ? ?| ? Stage                           



                          three ? ? ? ? ?                           



                          +---------------------                           



                          --+-------------------                           



                          --+-------------------                           



                          ------+| ?15-29 ? ? ?                           



                          ? ? ? ? ?| ?Stage four                           



                          ? ? ? ? | ? Stage four                           



                          ? ? ? ? ?                              



                          ?+--------------------                           



                          ---+------------------                           



                          ---+------------------                           



                          -------+| ?<15 (or                           



                          dialysis) ? ?| ?Stage                           



                          five ? ? ? ? | ? Stage                           



                          five ? ? ? ? ?                           



                          ?+--------------------                           



                          ---+------------------                           



                          ---+------------------                           



                          -------+ *Each stage                           



                          assumes the associated                           



                          GFR level has been in                           



                          effect for at least                           



                          three months. ?Stages                           



                          1 to 5, with or                           



                          without kidney                           



                          disease, indicate                           



                          chronic kidney                           



                          disease. Notes:                           



                          Determination of                           



                          stages one and two                           



                          (with eGFR                             



                          >59mL/min/1.73 m2)                           



                          requires estimation of                           



                          kidney damage for at                           



                          least three months as                           



                          defined by structural                           



                          or functional                           



                          abnormalities of the                           



                          kidney, manifested by                           



                          either:Pathological                           



                          abnormalities or                           



                          Markers of kidney                           



                          damage (including                           



                          abnormalities in the                           



                          composition of the                           



                          blood or urine or                           



                          abnormalities in                           



                          imaging tests).                           

 

             Lab Interpretation Abnormal                               



             (test code = 03390-6)                                        



[facility] acute hospital GLUCOSE (AUTOMATED)2022 13:45:48





             Test Item    Value        Reference Range Interpretation Comments

 

             POCT GLU (test code = 5106521274) 170 mg/dL           H      

      

 

             Lab Interpretation (test code = Abnormal                           

    



             79669-3)                                            



[facility] acute hospital GLUCOSE (AUTOMATED)2022 12:28:12





             Test Item    Value        Reference Range Interpretation Comments

 

             POCT GLU (test code = 9447777056) 109 mg/dL                  

      

 

             Lab Interpretation (test code = Normal                             

    



             93251-4)                                            



[facility] acute hospital GLUCOSE (AUTOMATED)2022 11:25:37





             Test Item    Value        Reference Range Interpretation Comments

 

             POCT GLU (test code = 8161024032) 134 mg/dL           H      

      

 

             Lab Interpretation (test code = Abnormal                           

    



             10867-1)                                            



[facility] Metabolic Panel (Na, K, Cl, CO2, 
Glucose, BUN, Creatinine, Ca)2022 10:50:17





             Test Item    Value        Reference Range Interpretation Comments

 

             NA (test code = 138 mmol/L   135-145                   



             3944890361)                                         

 

             K (test code = 5.1 mmol/L   3.5-5.0      H            Slight



             2406181830)                                         hemolysis

 

             CL (test code = 110 mmol/L          H            



             9565534092)                                         

 

             CO2 TOTAL (test code 23 mmol/L    23-31                     



             = 5855886872)                                        

 

             AGAP (test code =              2-16                      



             1667553809)                                         

 

             BUN (test code = 13 mg/dL     7-23                      Slight



             2805331728)                                         hemolysis

 

             GLUCOSE (test code = 131 mg/dL           H            



             4410107302)                                         

 

             CREATININE (test code 0.73 mg/dL   0.60-1.25                 



             = 5634780290)                                        

 

             CALCIUM (test code = 8.9 mg/dL    8.6-10.6                  



             6310444332)                                         

 

             eGFR (test code =              mL/min/1.73m2              



             2912055018)                                         

 

             MAL (test code = MAL) Association of                           



                          Glomerular                             



                          Filtration Rate                           



                          (GFR) and Staging                           



                          of Kidney Disease*                           



                          +------------------                           



                          -----+-------------                           



                          --------+----------                           



                          ---------------+|                           



                          GFR (mL/min/1.73                           



                          m2) ?| With Kidney                           



                          Damage ?| ?Without                           



                          Kidney                                 



                          Damage+------------                           



                          -----------+-------                           



                          --------------+----                           



                          -------------------                           



                          --+| ?>90 ? ? ? ? ?                           



                          ? ? ? ?| ?Stage one                           



                          ? ? ? ? ?| ? Normal                           



                          ? ? ? ? ? ? ?                           



                          ?+-----------------                           



                          ------+------------                           



                          ---------+---------                           



                          ----------------+|                           



                          ?60-89 ? ? ? ? ? ?                           



                          ? ?| ?Stage two ? ?                           



                          ? ? ?| ? Decreased                           



                          GFR ? ? ? ?                            



                          +------------------                           



                          -----+-------------                           



                          --------+----------                           



                          ---------------+|                           



                          ?30-59 ? ? ? ? ? ?                           



                          ? ?| ?Stage three ?                           



                          ? ? ?| ? Stage                           



                          three ? ? ? ? ?                           



                          +------------------                           



                          -----+-------------                           



                          --------+----------                           



                          ---------------+|                           



                          ?15-29 ? ? ? ? ? ?                           



                          ? ?| ?Stage four ?                           



                          ? ? ? | ? Stage                           



                          four ? ? ? ? ?                           



                          ?+-----------------                           



                          ------+------------                           



                          ---------+---------                           



                          ----------------+|                           



                          ?<15 (or dialysis)                           



                          ? ?| ?Stage five ?                           



                          ? ? ? | ? Stage                           



                          five ? ? ? ? ?                           



                          ?+-----------------                           



                          ------+------------                           



                          ---------+---------                           



                          ----------------+                           



                          *Each stage assumes                           



                          the associated GFR                           



                          level has been in                           



                          effect for at least                           



                          three months.                           



                          ?Stages 1 to 5,                           



                          with or without                           



                          kidney disease,                           



                          indicate chronic                           



                          kidney disease.                           



                          Notes:                                 



                          Determination of                           



                          stages one and two                           



                          (with eGFR                             



                          >59mL/min/1.73 m2)                           



                          requires estimation                           



                          of kidney damage                           



                          for at least three                           



                          months as defined                           



                          by structural or                           



                          functional                             



                          abnormalities of                           



                          the kidney,                            



                          manifested by                           



                          either:Pathological                           



                          abnormalities or                           



                          Markers of kidney                           



                          damage (including                           



                          abnormalities in                           



                          the composition of                           



                          the blood or urine                           



                          or abnormalities in                           



                          imaging tests).                           

 

             Lab Interpretation Abnormal                               



             (test code = 69390-5)                                        



[facility] acute hospital GLUCOSE (AUTOMATED)2022 10:35:11





             Test Item    Value        Reference Range Interpretation Comments

 

             POCT GLU (test code = 3361046934) 125 mg/dL           H      

      

 

             Lab Interpretation (test code = Abnormal                           

    



             32265-1)                                            



[facility] acute hospital GLUCOSE (AUTOMATED)2022 09:31:05





             Test Item    Value        Reference Range Interpretation Comments

 

             POCT GLU (test code = 8335083830) 137 mg/dL           H      

      

 

             Lab Interpretation (test code = Abnormal                           

    



             33322-4)                                            



[facility] acute hospital GLUCOSE (AUTOMATED)2022 08:28:48





             Test Item    Value        Reference Range Interpretation Comments

 

             POCT GLU (test code = 7469888708) 168 mg/dL           H      

      

 

             Lab Interpretation (test code = Abnormal                           

    



             46608-2)                                            



[facility] GLUCOSE (AUTOMATED)2022 07:26:17





             Test Item    Value        Reference Range Interpretation Comments

 

             POCT GLU (test code = 2993902098) 165 mg/dL           H      

      

 

             Lab Interpretation (test code = Abnormal                           

    



             30276-3)                                            



[facility] Metabolic Panel (Na, K, Cl, CO2, 
Glucose, BUN, Creatinine, Ca)2022 07:18:59





             Test Item    Value        Reference Range Interpretation Comments

 

             NA (test code = 136 mmol/L   135-145                   



             [id number])                                         

 

             K (test code = 5.1 mmol/L   3.5-5.0      H            



             2146170647)                                         

 

             CL (test code = 108 mmol/L                       



             8340330527)                                         

 

             CO2 TOTAL (test code = 24 mmol/L    23-31                     



             4428649319)                                         

 

             AGAP (test code =              2-16                      



             6272952846)                                         

 

             BUN (test code = 14 mg/dL     7-23                      



             9510841996)                                         

 

             GLUCOSE (test code = 202 mg/dL           H            



             2994476413)                                         

 

             CREATININE (test code = 0.79 mg/dL   0.60-1.25                 



             1356578072)                                         

 

             CALCIUM (test code = 8.9 mg/dL    8.6-10.6                  



             8976876453)                                         

 

             eGFR (test code =              mL/min/1.73m2              



             3494218866)                                         

 

             MAL (test code = MAL) Association of                           



                          Glomerular Filtration                           



                          Rate (GFR) and Staging                           



                          of Kidney Disease*                           



                          +---------------------                           



                          --+-------------------                           



                          --+-------------------                           



                          ------+| GFR                           



                          (mL/min/1.73 m2) ?|                           



                          With Kidney Damage ?|                           



                          ?Without Kidney                           



                          Damage+---------------                           



                          --------+-------------                           



                          --------+-------------                           



                          ------------+| ?>90 ?                           



                          ? ? ? ? ? ? ? ?|                           



                          ?Stage one ? ? ? ? ?|                           



                          ? Normal ? ? ? ? ? ? ?                           



                          ?+--------------------                           



                          ---+------------------                           



                          ---+------------------                           



                          -------+| ?60-89 ? ? ?                           



                          ? ? ? ? ?| ?Stage two                           



                          ? ? ? ? ?| ? Decreased                           



                          GFR ? ? ? ?                            



                          +---------------------                           



                          --+-------------------                           



                          --+-------------------                           



                          ------+| ?30-59 ? ? ?                           



                          ? ? ? ? ?| ?Stage                           



                          three ? ? ? ?| ? Stage                           



                          three ? ? ? ? ?                           



                          +---------------------                           



                          --+-------------------                           



                          --+-------------------                           



                          ------+| ?15-29 ? ? ?                           



                          ? ? ? ? ?| ?Stage four                           



                          ? ? ? ? | ? Stage four                           



                          ? ? ? ? ?                              



                          ?+--------------------                           



                          ---+------------------                           



                          ---+------------------                           



                          -------+| ?<15 (or                           



                          dialysis) ? ?| ?Stage                           



                          five ? ? ? ? | ? Stage                           



                          five ? ? ? ? ?                           



                          ?+--------------------                           



                          ---+------------------                           



                          ---+------------------                           



                          -------+ *Each stage                           



                          assumes the associated                           



                          GFR level has been in                           



                          effect for at least                           



                          three months. ?Stages                           



                          1 to 5, with or                           



                          without kidney                           



                          disease, indicate                           



                          chronic kidney                           



                          disease. Notes:                           



                          Determination of                           



                          stages one and two                           



                          (with eGFR                             



                          >59mL/min/1.73 m2)                           



                          requires estimation of                           



                          kidney damage for at                           



                          least three months as                           



                          defined by structural                           



                          or functional                           



                          abnormalities of the                           



                          kidney, manifested by                           



                          either:Pathological                           



                          abnormalities or                           



                          Markers of kidney                           



                          damage (including                           



                          abnormalities in the                           



                          composition of the                           



                          blood or urine or                           



                          abnormalities in                           



                          imaging tests).                           

 

             Lab Interpretation Abnormal                               



             (test code = 95344-5)                                        



[id number]





             Test Item    Value        Reference Range Interpretation Comments

 

             BOH (test code = <0.1         mmol/L                    



             1354624196)                                         

 

             MAL (test code = Normal Ranges: ? ?                           



             MAL)         Nonfasting ? ? ? ? ? Less                           



                          than 0.1 mmol/L ? ?                           



                          Overnight Fast ? ? ? Less                           



                          than 0.4 mmol/L ? ? Fasting                           



                          (1-2 weeks) ?6-8 mmol/L Test                          

 



                          developed and                           



                          characteristics determined                           



                          by [facility].                          

 



[facility]





             Test Item    Value        Reference Range Interpretation Comments

 

             POCT GLU (test code = 8136865304) 212 mg/dL           H      

      

 

             Lab Interpretation (test code = Abnormal                           

    



             33621-6)                                            



[facility] hospital GLUCOSE (AUTOMATED)2022 05:28:19





             Test Item    Value        Reference Range Interpretation Comments

 

             POCT GLU (test code = 1568186676) 239 mg/dL           H      

      

 

             Lab Interpretation (test code = Abnormal                           

    



             69381-7)                                            



[facility] acute hospital GLUCOSE (AUTOMATED)2022 04:29:31





             Test Item    Value        Reference Range Interpretation Comments

 

             POCT GLU (test code = 2923981617) 251 mg/dL           H      

      

 

             Lab Interpretation (test code = Abnormal                           

    



             25697-6)                                            



[facility] Metabolic Panel (Na, K, Cl, CO2, 
Glucose, BUN, Creatinine, Ca)2022 03:49:55





             Test Item    Value        Reference Range Interpretation Comments

 

             NA (test code = 141 mmol/L   135-145                   



             6287287476)                                         

 

             K (test code = 4.2 mmol/L   3.5-5.0                   



             7010858972)                                         

 

             CL (test code = 108 mmol/L                       



             4858064164)                                         

 

             CO2 TOTAL (test code = 26 mmol/L    23-31                     



             8333817570)                                         

 

             AGAP (test code =              2-16                      



             7715818614)                                         

 

             BUN (test code = 14 mg/dL     7-23                      



             1106363811)                                         

 

             GLUCOSE (test code = 164 mg/dL           H            



             2030608219)                                         

 

             CREATININE (test code = 0.84 mg/dL   0.60-1.25                 



             7506480382)                                         

 

             CALCIUM (test code = 9.3 mg/dL    8.6-10.6                  



             7439869884)                                         

 

             eGFR (test code =              mL/min/1.73m2              



             4151478998)                                         

 

             MAL (test code = MAL) Association of                           



                          Glomerular Filtration                           



                          Rate (GFR) and Staging                           



                          of Kidney Disease*                           



                          +---------------------                           



                          --+-------------------                           



                          --+-------------------                           



                          ------+| GFR                           



                          (mL/min/1.73 m2) ?|                           



                          With Kidney Damage ?|                           



                          ?Without Kidney                           



                          Damage+---------------                           



                          --------+-------------                           



                          --------+-------------                           



                          ------------+| ?>90 ?                           



                          ? ? ? ? ? ? ? ?|                           



                          ?Stage one ? ? ? ? ?|                           



                          ? Normal ? ? ? ? ? ? ?                           



                          ?+--------------------                           



                          ---+------------------                           



                          ---+------------------                           



                          -------+| ?60-89 ? ? ?                           



                          ? ? ? ? ?| ?Stage two                           



                          ? ? ? ? ?| ? Decreased                           



                          GFR ? ? ? ?                            



                          +---------------------                           



                          --+-------------------                           



                          --+-------------------                           



                          ------+| ?30-59 ? ? ?                           



                          ? ? ? ? ?| ?Stage                           



                          three ? ? ? ?| ? Stage                           



                          three ? ? ? ? ?                           



                          +---------------------                           



                          --+-------------------                           



                          --+-------------------                           



                          ------+| ?15-29 ? ? ?                           



                          ? ? ? ? ?| ?Stage four                           



                          ? ? ? ? | ? Stage four                           



                          ? ? ? ? ?                              



                          ?+--------------------                           



                          ---+------------------                           



                          ---+------------------                           



                          -------+| ?<15 (or                           



                          dialysis) ? ?| ?Stage                           



                          five ? ? ? ? | ? Stage                           



                          five ? ? ? ? ?                           



                          ?+--------------------                           



                          ---+------------------                           



                          ---+------------------                           



                          -------+ *Each stage                           



                          assumes the associated                           



                          GFR level has been in                           



                          effect for at least                           



                          three months. ?Stages                           



                          1 to 5, with or                           



                          without kidney                           



                          disease, indicate                           



                          chronic kidney                           



                          disease. Notes:                           



                          Determination of                           



                          stages one and two                           



                          (with eGFR                             



                          >59mL/min/1.73 m2)                           



                          requires estimation of                           



                          kidney damage for at                           



                          least three months as                           



                          defined by structural                           



                          or functional                           



                          abnormalities of the                           



                          kidney, manifested by                           



                          either:Pathological                           



                          abnormalities or                           



                          Markers of kidney                           



                          damage (including                           



                          abnormalities in the                           



                          composition of the                           



                          blood or urine or                           



                          abnormalities in                           



                          imaging tests).                           

 

             Lab Interpretation Abnormal                               



             (test code = 30320-8)                                        



[facility] acute hospital GLUCOSE (AUTOMATED)2022 03:20:15





             Test Item    Value        Reference Range Interpretation Comments

 

             POCT GLU (test code = 5307904385) 174 mg/dL           H      

      

 

             Lab Interpretation (test code = Abnormal                           

    



             56479-0)                                            



[facility] hospital GLUCOSE (AUTOMATED)2022 02:25:54





             Test Item    Value        Reference Range Interpretation Comments

 

             POCT GLU (test code = 6197740553) 158 mg/dL           H      

      

 

             Lab Interpretation (test code = Abnormal                           

    



             63178-2)                                            



[facility] acute hospital GLUCOSE (AUTOMATED)2022 01:37:09





             Test Item    Value        Reference Range Interpretation Comments

 

             POCT GLU (test code = 0845159926) 219 mg/dL           H      

      

 

             Lab Interpretation (test code = Abnormal                           

    



             98597-7)                                            



[facility] hospital GLUCOSE (AUTOMATED)2022 00:19:48





             Test Item    Value        Reference Range Interpretation Comments

 

             POCT GLU (test code = 9555103425) 345 mg/dL           H      

      

 

             Lab Interpretation (test code = Abnormal                           

    



             99072-4)                                            



[facility] hospital GLUCOSE (AUTOMATED)2022 22:59:57





             Test Item    Value        Reference Range Interpretation Comments

 

             POCT GLU (test code = 7448585086) 318 mg/dL           H      

      

 

             Lab Interpretation (test code = Abnormal                           

    



             60653-2)                                            



[facility], [facility], Cl, CO2, 
Glucose, BUN, Creatinine, Ca)2022 22:41:00





             Test Item    Value        Reference Range Interpretation Comments

 

             NA (test code = 140 mmol/L   135-145                   



             0393427343)                                         

 

             K (test code = 3.1 mmol/L   3.5-5.0      L            



             3752439036)                                         

 

             CL (test code = 116 mmol/L          H            



             5396198975)                                         

 

             CO2 TOTAL (test code = 21 mmol/L    23-31        L            



             7110152336)                                         

 

             AGAP (test code =              2-16                      



             1637706337)                                         

 

             BUN (test code = 12 mg/dL     7-23                      



             3238993886)                                         

 

             GLUCOSE (test code = 281 mg/dL           H            



             7050262473)                                         

 

             CREATININE (test code = 0.62 mg/dL   0.60-1.25                 



             0312625213)                                         

 

             CALCIUM (test code = 7.2 mg/dL    8.6-10.6     L            



             4916170585)                                         

 

             eGFR (test code =              mL/min/1.73m2              



             9560221208)                                         

 

             MAL (test code = MAL) Association of                           



                          Glomerular Filtration                           



                          Rate (GFR) and Staging                           



                          of Kidney Disease*                           



                          +---------------------                           



                          --+-------------------                           



                          --+-------------------                           



                          ------+| GFR                           



                          (mL/min/1.73 m2) ?|                           



                          With Kidney Damage ?|                           



                          ?Without Kidney                           



                          Damage+---------------                           



                          --------+-------------                           



                          --------+-------------                           



                          ------------+| ?>90 ?                           



                          ? ? ? ? ? ? ? ?|                           



                          ?Stage one ? ? ? ? ?|                           



                          ? Normal ? ? ? ? ? ? ?                           



                          ?+--------------------                           



                          ---+------------------                           



                          ---+------------------                           



                          -------+| ?60-89 ? ? ?                           



                          ? ? ? ? ?| ?Stage two                           



                          ? ? ? ? ?| ? Decreased                           



                          GFR ? ? ? ?                            



                          +---------------------                           



                          --+-------------------                           



                          --+-------------------                           



                          ------+| ?30-59 ? ? ?                           



                          ? ? ? ? ?| ?Stage                           



                          three ? ? ? ?| ? Stage                           



                          three ? ? ? ? ?                           



                          +---------------------                           



                          --+-------------------                           



                          --+-------------------                           



                          ------+| ?15-29 ? ? ?                           



                          ? ? ? ? ?| ?Stage four                           



                          ? ? ? ? | ? Stage four                           



                          ? ? ? ? ?                              



                          ?+--------------------                           



                          ---+------------------                           



                          ---+------------------                           



                          -------+| ?<15 (or                           



                          dialysis) ? ?| ?Stage                           



                          five ? ? ? ? | ? Stage                           



                          five ? ? ? ? ?                           



                          ?+--------------------                           



                          ---+------------------                           



                          ---+------------------                           



                          -------+ *Each stage                           



                          assumes the associated                           



                          GFR level has been in                           



                          effect for at least                           



                          three months. ?Stages                           



                          1 to 5, with or                           



                          without kidney                           



                          disease, indicate                           



                          chronic kidney                           



                          disease. Notes:                           



                          Determination of                           



                          stages one and two                           



                          (with eGFR                             



                          >59mL/min/1.73 m2)                           



                          requires estimation of                           



                          kidney damage for at                           



                          least three months as                           



                          defined by structural                           



                          or functional                           



                          abnormalities of the                           



                          kidney, manifested by                           



                          either:Pathological                           



                          abnormalities or                           



                          Markers of kidney                           



                          damage (including                           



                          abnormalities in the                           



                          composition of the                           



                          blood or urine or                           



                          abnormalities in                           



                          imaging tests).                           

 

             Lab Interpretation Abnormal                               



             (test code = 04563-5)                                        



[facility]SE (AUTOMATED)2022 21:53:29





             Test Item    Value        Reference Range Interpretation Comments

 

             POCT GLU (test code = 0886699100) 368 mg/dL           H      

      

 

             Lab Interpretation (test code = Abnormal                           

    



             80574-8)                                            



[facility] hospital GLUCOSE (AUTOMATED)2022 20:41:51





             Test Item    Value        Reference Range Interpretation Comments

 

             POCT GLU (test code = 8457917941) 458 mg/dL           HH     

      

 

             Lab Interpretation (test code = Abnormal                           

    



             58967-6)                                            



[facility] acute hospital GLUCOSE (AUTOMATED)2022 19:00:13





             Test Item    Value        Reference Range Interpretation Comments

 

             POCT GLU (test code = 9154059330) 369 mg/dL           H      

      

 

             Lab Interpretation (test code = Abnormal                           

    



             43891-1)                                            



[facility]Na, K, Cl, CO2, 
Glucose, BUN, Creatinine, Ca)2022 18:32:29





             Test Item    Value        Reference Range Interpretation Comments

 

             NA (test code = 145 mmol/L   135-145                   



             [id number])                                         

 

             K (test code = 4.8 mmol/L   3.5-5.0                   Slight



             [id number])                                         hemolysis

 

             CL (test code = 112 mmol/L          H            



             4194506668)                                         

 

             CO2 TOTAL (test code 26 mmol/L    23-31                     



             = 3056652925)                                        

 

             AGAP (test code =              2-16                      



             6382490993)                                         

 

             BUN (test code = 16 mg/dL     7-23                      Slight



             6183931511)                                         hemolysis

 

             GLUCOSE (test code = 282 mg/dL           H            



             2876103071)                                         

 

             CREATININE (test code 0.83 mg/dL   0.60-1.25                 



             = 9464936923)                                        

 

             CALCIUM (test code = 10.0 mg/dL   8.6-10.6                  



             9391064541)                                         

 

             eGFR (test code =              mL/min/1.73m2              



             9024170980)                                         

 

             MAL (test code = MAL) Association of                           



                          Glomerular                             



                          Filtration Rate                           



                          (GFR) and Staging                           



                          of Kidney Disease*                           



                          +------------------                           



                          -----+-------------                           



                          --------+----------                           



                          ---------------+|                           



                          GFR (mL/min/1.73                           



                          m2) ?| With Kidney                           



                          Damage ?| ?Without                           



                          Kidney                                 



                          Damage+------------                           



                          -----------+-------                           



                          --------------+----                           



                          -------------------                           



                          --+| ?>90 ? ? ? ? ?                           



                          ? ? ? ?| ?Stage one                           



                          ? ? ? ? ?| ? Normal                           



                          ? ? ? ? ? ? ?                           



                          ?+-----------------                           



                          ------+------------                           



                          ---------+---------                           



                          ----------------+|                           



                          ?60-89 ? ? ? ? ? ?                           



                          ? ?| ?Stage two ? ?                           



                          ? ? ?| ? Decreased                           



                          GFR ? ? ? ?                            



                          +------------------                           



                          -----+-------------                           



                          --------+----------                           



                          ---------------+|                           



                          ?30-59 ? ? ? ? ? ?                           



                          ? ?| ?Stage three ?                           



                          ? ? ?| ? Stage                           



                          three ? ? ? ? ?                           



                          +------------------                           



                          -----+-------------                           



                          --------+----------                           



                          ---------------+|                           



                          ?15-29 ? ? ? ? ? ?                           



                          ? ?| ?Stage four ?                           



                          ? ? ? | ? Stage                           



                          four ? ? ? ? ?                           



                          ?+-----------------                           



                          ------+------------                           



                          ---------+---------                           



                          ----------------+|                           



                          ?<15 (or dialysis)                           



                          ? ?| ?Stage five ?                           



                          ? ? ? | ? Stage                           



                          five ? ? ? ? ?                           



                          ?+-----------------                           



                          ------+------------                           



                          ---------+---------                           



                          ----------------+                           



                          *Each stage assumes                           



                          the associated GFR                           



                          level has been in                           



                          effect for at least                           



                          three months.                           



                          ?Stages 1 to 5,                           



                          with or without                           



                          kidney disease,                           



                          indicate chronic                           



                          kidney disease.                           



                          Notes:                                 



                          Determination of                           



                          stages one and two                           



                          (with eGFR                             



                          >59mL/min/1.73 m2)                           



                          requires estimation                           



                          of kidney damage                           



                          for at least three                           



                          months as defined                           



                          by structural or                           



                          functional                             



                          abnormalities of                           



                          the kidney,                            



                          manifested by                           



                          either:Pathological                           



                          abnormalities or                           



                          Markers of kidney                           



                          damage (including                           



                          abnormalities in                           



                          the composition of                           



                          the blood or urine                           



                          or abnormalities in                           



                          imaging tests).                           

 

             Lab Interpretation Abnormal                               



             (test code = 11309-1)                                        



[facility] acute hospital GLUCOSE (AUTOMATED)2022 17:45:09





             Test Item    Value        Reference Range Interpretation Comments

 

             POCT GLU (test code = 2768188462) 284 mg/dL           H      

      

 

             Lab Interpretation (test code = Abnormal                           

    



             83329-3)                                            



[id number]





             Test Item    Value        Reference Range Interpretation Comments

 

             BOH (test code = 0.9 mmol/L                             



             6084546725)                                         

 

             MAL (test code = Normal Ranges: ? ?                           



             MAL)         Nonfasting ? ? ? ? ? Less                           



                          than 0.1 mmol/L ? ?                           



                          Overnight Fast ? ? ? Less                           



                          than 0.4 mmol/L ? ? Fasting                           



                          (1-2 weeks) ?6-8 mmol/L Test                          

 



                          developed and                           



                          characteristics determined                           



                          by [facility].                          

 



General acute hospital GLUCOSE (AUTOMATED)2022 16:33:41





             Test Item    Value        Reference Range Interpretation Comments

 

             POCT GLU (test code = 4288878182) 266 mg/dL           H      

      

 

             Lab Interpretation (test code = Abnormal                           

    



             12676-0)                                            



[facility] acute hospital GLUCOSE (AUTOMATED)2022 15:30:41





             Test Item    Value        Reference Range Interpretation Comments

 

             POCT GLU (test code = 9934574642) 294 mg/dL           H      

      

 

             Lab Interpretation (test code = Abnormal                           

    



             25994-6)                                            



General acute hospital GLUCOSE (AUTOMATED)2022 14:23:40





             Test Item    Value        Reference Range Interpretation Comments

 

             POCT GLU (test code = 9742984093) 511 mg/dL           HH     

      

 

             Lab Interpretation (test code = Abnormal                           

    



             13548-2)                                            



[facility] WITHOUT [id number] 13:55:53





             Test Item    Value        Reference Range Interpretation Comments

 

             WBC (test code = 6690-2)              See_Comment  H             [A

utomated message]



                                                                 The system [employer]



                                                                 generated this



                                                                 result transmit

huma



                                                                 reference range

:



                                                                 4.20 - 10.70



                                                                 10*3/?L. The



                                                                 reference range

 was



                                                                 not used to



                                                                 interpret this



                                                                 result as



                                                                 normal/abnormal

.

 

             RBC (test code = 789-8)              See_Comment  H             [Au

tomated message]



                                                                 The system [employer]



                                                                 generated this



                                                                 result transmit

huma



                                                                 reference range

:



                                                                 4.26 - 5.52 10*

6/?L.



                                                                 The reference r

zhen



                                                                 was not used to



                                                                 interpret this



                                                                 result as



                                                                 normal/abnormal

.

 

             HGB (test code = 718-7) 16.2 g/dL    12.2-16.4                 

 

             HCT (test code = 4544-3) 48.4 %       38.4-49.3                 

 

             MCH (test code = 785-6) 29.1 pg      26.1-32.7                 

 

             MCV (test code = 787-2) 86.9 fL      81.7-95.6                 

 

             MCHC (test code = 786-4) 33.5 g/dL    31.2-35.0                 

 

             PLT (test code = 777-3)              See_Comment  H             [Au

tomated message]



                                                                 The system [employer]



                                                                 generated this



                                                                 result transmit

huma



                                                                 reference range

: 150



                                                                 - 328 10*3/?L. 

The



                                                                 reference range

 was



                                                                 not used to



                                                                 interpret this



                                                                 result as



                                                                 normal/abnormal

.

 

             MPV (test code = 11.0 fL      9.8-13.0                  



             73447-8)                                            

 

             RDW-CV (test code = 14.2 %       12.1-15.4                 



             788-0)                                              

 

             RDW-SD (test code = 44.8 fL      38.5-51.6                 



             35650-1)                                            

 

             NRBC x10^3 (test code = <0.01        See_Comment                [Au

tomated message]



             9273767226)                                         The system [employer]

h



                                                                 generated this



                                                                 result transmit

huma



                                                                 reference range

:



                                                                 10*3/?L. The



                                                                 reference range

 was



                                                                 not used to



                                                                 interpret this



                                                                 result as



                                                                 normal/abnormal

.

 

             NRBC/100 WBC (test code              See_Comment                [Au

tomated message]



             = 7394694611)                                        The system [employer]

ch



                                                                 generated this



                                                                 result transmit

huma



                                                                 reference range

: 0.0



                                                                 - 10.0 /100 WBC

s.



                                                                 The reference r

zhen



                                                                 was not used to



                                                                 interpret this



                                                                 result as



                                                                 normal/abnormal

.

 

             IPF % (test code =                                        



             0751396759)                                         

 

             Lab Interpretation (test Abnormal                               



             code = 15853-1)                                        



[facility] acute hospital GLUCOSE (AUTOMATED)2022 13:34:21





             Test Item    Value        Reference Range Interpretation Comments

 

             POCT GLU (test code = 3638723118) 538 mg/dL           HH     

      

 

             Lab Interpretation (test code = Abnormal                           

    



             76179-3)                                            



[facility] acute hospital GLUCOSE (AUTOMATED)2022 13:30:43





             Test Item    Value        Reference Range Interpretation Comments

 

             POCT GLU (test code = 1446016710) >600                HH     

      

 

             Lab Interpretation (test code = Abnormal                           

    



             22133-6)                                            



[facility] acute hospital GLUCOSE (AUTOMATED)2022 13:30:43





             Test Item    Value        Reference Range Interpretation Comments

 

             POCT GLU (test code = 8171934903) >600                HH     

      

 

             Lab Interpretation (test code = Abnormal                           

    



             88874-6)                                            



[facility] acute hospital GLUCOSE (AUTOMATED)2022 13:30:43





             Test Item    Value        Reference Range Interpretation Comments

 

             POCT GLU (test code = >600                HH           Notifi

ed Provider



             [id number])                                         

 

             Lab Interpretation (test Abnormal                               



             code = 50618-7)                                        



[facility] acute hospital GLUCOSE (AUTOMATED)2022 13:30:43





             Test Item    Value        Reference Range Interpretation Comments

 

             POCT GLU (test code = 5703428315) >600                HH     

      

 

             Lab Interpretation (test code = Abnormal                           

    



             93288-4)                                            



General acute hospital GLUCOSE (AUTOMATED)2022 13:30:38





             Test Item    Value        Reference Range Interpretation Comments

 

             POCT GLU (test code = 9612960791) >600                HH     

      

 

             Lab Interpretation (test code = Abnormal                           

    



             10339-1)                                            



[facility]38





             Test Item    Value        Reference Range Interpretation Comments

 

             POCT GLU (test code = 3810024264) >600                HH     

      

 

             Lab Interpretation (test code = Abnormal                           

    



             55163-5)                                            



[facility] [id number] 12:37:44





             Test Item    Value        Reference Range Interpretation Comments

 

             OSMOLALITY (test code =              See_Comment  HH            [Au

tomated message]



             2692-2)                                             The system [employer]



                                                                 generated this 

result



                                                                 transmitted ref

erence



                                                                 range: 278 - 30

5



                                                                 mOsm/kg. The



                                                                 reference range

 was



                                                                 not used to int

erpret



                                                                 this result as



                                                                 normal/abnormal

.

 

             Lab Interpretation (test Abnormal                               



             code = 78138-7)                                        



[facility] Metabolic Panel (Na, K, Cl, CO2, 
Glucose, BUN, Creatinine, Ca)2022 12:23:35





             Test Item    Value        Reference Range Interpretation Comments

 

             NA (test code = 145 mmol/L   135-145                   



             0792829400)                                         

 

             K (test code = 4.4 mmol/L   3.5-5.0                   



             1693789460)                                         

 

             CL (test code = 109 mmol/L          H            



             3877816088)                                         

 

             CO2 TOTAL (test code = 21 mmol/L    23-31        L            



             1173791967)                                         

 

             AGAP (test code =              2-16                      



             4949567110)                                         

 

             BUN (test code = 15 mg/dL     7-23                      



             1308520048)                                         

 

             GLUCOSE (test code = 753 mg/dL           HH           



             1771724611)                                         

 

             CREATININE (test code = 0.79 mg/dL   0.60-1.25                 



             8442960663)                                         

 

             CALCIUM (test code = 10.9 mg/dL   8.6-10.6     H            



             8547619366)                                         

 

             eGFR (test code =              mL/min/1.73m2              



             0900827279)                                         

 

             MAL (test code = MAL) Association of                           



                          Glomerular Filtration                           



                          Rate (GFR) and Staging                           



                          of Kidney Disease*                           



                          +---------------------                           



                          --+-------------------                           



                          --+-------------------                           



                          ------+| GFR                           



                          (mL/min/1.73 m2) ?|                           



                          With Kidney Damage ?|                           



                          ?Without Kidney                           



                          Damage+---------------                           



                          --------+-------------                           



                          --------+-------------                           



                          ------------+| ?>90 ?                           



                          ? ? ? ? ? ? ? ?|                           



                          ?Stage one ? ? ? ? ?|                           



                          ? Normal ? ? ? ? ? ? ?                           



                          ?+--------------------                           



                          ---+------------------                           



                          ---+------------------                           



                          -------+| ?60-89 ? ? ?                           



                          ? ? ? ? ?| ?Stage two                           



                          ? ? ? ? ?| ? Decreased                           



                          GFR ? ? ? ?                            



                          +---------------------                           



                          --+-------------------                           



                          --+-------------------                           



                          ------+| ?30-59 ? ? ?                           



                          ? ? ? ? ?| ?Stage                           



                          three ? ? ? ?| ? Stage                           



                          three ? ? ? ? ?                           



                          +---------------------                           



                          --+-------------------                           



                          --+-------------------                           



                          ------+| ?15-29 ? ? ?                           



                          ? ? ? ? ?| ?Stage four                           



                          ? ? ? ? | ? Stage four                           



                          ? ? ? ? ?                              



                          ?+--------------------                           



                          ---+------------------                           



                          ---+------------------                           



                          -------+| ?<15 (or                           



                          dialysis) ? ?| ?Stage                           



                          five ? ? ? ? | ? Stage                           



                          five ? ? ? ? ?                           



                          ?+--------------------                           



                          ---+------------------                           



                          ---+------------------                           



                          -------+ *Each stage                           



                          assumes the associated                           



                          GFR level has been in                           



                          effect for at least                           



                          three months. ?Stages                           



                          1 to 5, with or                           



                          without kidney                           



                          disease, indicate                           



                          chronic kidney                           



                          disease. Notes:                           



                          Determination of                           



                          stages one and two                           



                          (with eGFR                             



                          >59mL/min/1.73 m2)                           



                          requires estimation of                           



                          kidney damage for at                           



                          least three months as                           



                          defined by structural                           



                          or functional                           



                          abnormalities of the                           



                          kidney, manifested by                           



                          either:Pathological                           



                          abnormalities or                           



                          Markers of kidney                           



                          damage (including                           



                          abnormalities in the                           



                          composition of the                           



                          blood or urine or                           



                          abnormalities in                           



                          imaging tests).                           

 

             Lab Interpretation Abnormal                               



             (test code = 92771-7)                                        



[facility] 12:14:56





             Test Item    Value        Reference Range Interpretation Comments

 

             POCT GLU (test code = 3275706075) 564 mg/dL           HH     

      

 

             Lab Interpretation (test code = Abnormal                           

    



             51869-4)                                            



[facility] [id number] 12:05:40





             Test Item    Value        Reference Range Interpretation Comments

 

             MAGNESIUM (test code = 7705645120) 2.5 mg/dL    1.7-2.4      H     

       

 

             Lab Interpretation (test code = Abnormal                           

    



             92044-6)                                            



[facility] 12:05:20





             Test Item    Value        Reference Range Interpretation Comments

 

             MAGNESIUM (test code = 1281877206) 2.5 mg/dL    1.7-2.4      H     

       

 

             Lab Interpretation (test code = Abnormal                           

    



             76946-1)                                            



[facility] [id number] 12:05:20





             Test Item    Value        Reference Range Interpretation Comments

 

             PHOSPHORUS (test code = 2375823095) 6.2 mg/dL    2.5-5.0      H    

        

 

             Lab Interpretation (test code = Abnormal                           

    



             01804-7)                                            



[facility] + LACTIC [id number] 05:53:48





             Test Item    Value        Reference Range Interpretation Comments

 

             PH (test code =              7.32-7.42                 



             4722185089)                                         

 

             PCO2 MARCELINA (test code              See_Comment                [Automa

huma



             = 7744059065)                                        message] The



                                                                 system which



                                                                 generated this



                                                                 result



                                                                 transmitted



                                                                 reference range

:



                                                                 41 - 51 mmHg. T

he



                                                                 reference range



                                                                 was not used to



                                                                 interpret this



                                                                 result as



                                                                 normal/abnormal

.

 

             PO2 MARCELINA (test code =              See_Comment  HH            [Autom

ated



             2903875321)                                         message] The



                                                                 system which



                                                                 generated this



                                                                 result



                                                                 transmitted



                                                                 reference range

:



                                                                 25 - 40 mmHg. T

he



                                                                 reference range



                                                                 was not used to



                                                                 interpret this



                                                                 result as



                                                                 normal/abnormal

.

 

             HCO3 MARCELINA (test code              See_Comment  L             [Automa

huma



             = 2962249553)                                        message] The



                                                                 system which



                                                                 generated this



                                                                 result



                                                                 transmitted



                                                                 reference range

:



                                                                 24 - 28 mEq/L.



                                                                 The reference



                                                                 range was not



                                                                 used to interpr

et



                                                                 this result as



                                                                 normal/abnormal

.

 

             AC VBE(BEAKER) (test              mEq/L                     



             code = 3356231256)                                        

 

             THB MARCELINA (test code = 17.7 g/dL    13.5-18.0                 



             6967415572)                                         

 

             %O2HB MARCELINA (test code 93.8 %       52.0-63.0    H            



             = 9234848777)                                        

 

             %COHB MARCELINA (test code 2.1 %        0.0-1.5      H            



             = 4323981470)                                        

 

             %METHB MARCELINA (test 0.1 %        0.4-1.5      L            



             code = 5521629028)                                        

 

             VOL%O2 MARCELINA (test 23.3 %       6.0-12.0     H            



             code = 7429799391)                                        

 

             NA (test code = 142 mmol/L   135-145                   



             8989700339)                                         

 

             K+ (test code = 4.4 mmol/L   3.5-5.0                   



             0653802435)                                         

 

             AC CA IONZ (test 5.40 mg/dL   4.50-5.30    H            



             code = 7575691177)                                        

 

             GLUCOSE (test code = <20                 LL           



             7312579937)                                         

 

             LACTIC ACID (test 3.37 mmol/L  0.50-2.20    H            



             code = 6005901531)                                        

 

             MAL (test code = *ac gluc                               



             MAL)         unmeasurable                           

 

             Lab Interpretation Abnormal                               



             (test code =                                        



             86264-0)                                            



[facility]91246[facility]2022 
04:59:53





             Test Item    Value        Reference Range Interpretation Comments

 

             NA (test code = 140 mmol/L   135-145                   



             0200757495)                                         

 

             K (test code = 4.8 mmol/L   3.5-5.0                   



             9485114796)                                         

 

             CL (test code = 100 mmol/L                       



             1061698114)                                         

 

             CO2 TOTAL (test code = 20 mmol/L    23-31        L            



             8404386643)                                         

 

             AGAP (test code =              2-16         H            



             6289177754)                                         

 

             BUN (test code = 14 mg/dL     7-23                      



             5485130841)                                         

 

             GLUCOSE (test code = 1083 mg/dL          HH           



             2628026933)                                         

 

             CREATININE (test code = 0.80 mg/dL   0.60-1.25                 



             4855329829)                                         

 

             TOTAL BILI (test code = 1.0 mg/dL    0.1-1.1                   



             0105688441)                                         

 

             CALCIUM (test code = 12.0 mg/dL   8.6-10.6     H            



             2486518893)                                         

 

             T PROTEIN (test code = 8.1 g/dL     6.3-8.2                   



             6987964083)                                         

 

             ALBUMIN (test code = 4.7 g/dL     3.5-5.0                   



             4832304945)                                         

 

             ALK PHOS (test code = 173 U/L             H            



             1544069907)                                         

 

             ALTv (test code = 36 U/L       5-50                      



             1742-6)                                             

 

             AST(SGOT) (test code = 18 U/L       13-40                     



             9499198399)                                         

 

             eGFR (test code =              mL/min/1.73m2              



             9438108311)                                         

 

             MAL (test code = MAL) Association of                           



                          Glomerular Filtration                           



                          Rate (GFR) and Staging                           



                          of Kidney Disease*                           



                          +---------------------                           



                          --+-------------------                           



                          --+-------------------                           



                          ------+| GFR                           



                          (mL/min/1.73 m2) ?|                           



                          With Kidney Damage ?|                           



                          ?Without Kidney                           



                          Damage+---------------                           



                          --------+-------------                           



                          --------+-------------                           



                          ------------+| ?>90 ?                           



                          ? ? ? ? ? ? ? ?|                           



                          ?Stage one ? ? ? ? ?|                           



                          ? Normal ? ? ? ? ? ? ?                           



                          ?+--------------------                           



                          ---+------------------                           



                          ---+------------------                           



                          -------+| ?60-89 ? ? ?                           



                          ? ? ? ? ?| ?Stage two                           



                          ? ? ? ? ?| ? Decreased                           



                          GFR ? ? ? ?                            



                          +---------------------                           



                          --+-------------------                           



                          --+-------------------                           



                          ------+| ?30-59 ? ? ?                           



                          ? ? ? ? ?| ?Stage                           



                          three ? ? ? ?| ? Stage                           



                          three ? ? ? ? ?                           



                          +---------------------                           



                          --+-------------------                           



                          --+-------------------                           



                          ------+| ?15-29 ? ? ?                           



                          ? ? ? ? ?| ?Stage four                           



                          ? ? ? ? | ? Stage four                           



                          ? ? ? ? ?                              



                          ?+--------------------                           



                          ---+------------------                           



                          ---+------------------                           



                          -------+| ?<15 (or                           



                          dialysis) ? ?| ?Stage                           



                          five ? ? ? ? | ? Stage                           



                          five ? ? ? ? ?                           



                          ?+--------------------                           



                          ---+------------------                           



                          ---+------------------                           



                          -------+ *Each stage                           



                          assumes the associated                           



                          GFR level has been in                           



                          effect for at least                           



                          three months. ?Stages                           



                          1 to 5, with or                           



                          without kidney                           



                          disease, indicate                           



                          chronic kidney                           



                          disease. Notes:                           



                          Determination of                           



                          stages one and two                           



                          (with eGFR                             



                          >59mL/min/1.73 m2)                           



                          requires estimation of                           



                          kidney damage for at                           



                          least three months as                           



                          defined by structural                           



                          or functional                           



                          abnormalities of the                           



                          kidney, manifested by                           



                          either:Pathological                           



                          abnormalities or                           



                          Markers of kidney                           



                          damage (including                           



                          abnormalities in the                           



                          composition of the                           



                          blood or urine or                           



                          abnormalities in                           



                          imaging tests).                           

 

             Lab Interpretation Abnormal                               



             (test code = 13549-3)                                        



[id number]





             Test Item    Value        Reference    Interpretation Comments



                                       Range                     

 

             TROPONIN I (test <0.012       See_Comment                [Automated



             code = 1910326923)                                        message] 

The



                                                                 system which



                                                                 generated this



                                                                 result



                                                                 transmitted



                                                                 reference range

:



                                                                 <=0.034 ng/mL.



                                                                 The reference



                                                                 range was not



                                                                 used to



                                                                 interpret this



                                                                 result as



                                                                 normal/abnormal

.

 

             MAL (test code = Reference (Normal)                           



             MAL)         Range (defined by                           



                          the 99th percentile                           



                          reference limit): <=                           



                          0.034 ng/mL Note:                           



                          Cardiac troponin                           



                          begins to rise 3-4                           



                          hours after the                           



                          onset of ischemia.                           



                          Repeat in 4-6 hours                           



                          if the sample was                           



                          drawn within 3-4                           



                          hours of the onset                           



                          of the symptom and                           



                          found normal.                           



                          Diagnosis of                           



                          myocardial injury is                           



                          made with acute                           



                          changes in cTn                           



                          concentrations with                           



                          at least one serial                           



                          sample above the                           



                          99th percentile                           



                          upper reference                           



                          limit (URL), taken                           



                          together with the                           



                          patient's clinical                           



                          presentation. Biotin                           



                          has been reported to                           



                          cause a negative                           



                          bias, interpret                           



                          results relative to                           



                          patient's use of                           



                          biotin.                                

 

             Lab Interpretation Normal                                 



             (test code =                                        



             57115-9)                                            



[id number]48:23





             Test Item    Value        Reference Range Interpretation Comments

 

             NT-proBNP (test code 71 pg/mL     See_Comment                [Autom

ated



             = [id number])                                        message] The



                                                                 system which



                                                                 generated this



                                                                 result



                                                                 transmitted



                                                                 reference range

:



                                                                 <=125. The



                                                                 reference range



                                                                 was not used to



                                                                 interpret this



                                                                 result as



                                                                 normal/abnormal

.

 

             MAL (test code = MAL) Biotin has been                           



                          reported to                            



                          cause a negative                           



                          bias, interpret                           



                          results relative                           



                          to patient's use                           



                          of biotin.                             

 

             Lab Interpretation Normal                                 



             (test code = 43033-0)                                        



[id number] 03:48:23





             Test Item    Value        Reference Range Interpretation Comments

 

             NT-proBNP (test code 71 pg/mL     See_Comment                [Autom

ated



             = 2573370314)                                        message] The



                                                                 system which



                                                                 generated this



                                                                 result



                                                                 transmitted



                                                                 reference range

:



                                                                 <=125. The



                                                                 reference range



                                                                 was not used to



                                                                 interpret this



                                                                 result as



                                                                 normal/abnormal

.

 

             MAL (test code = MAL) Biotin has been                           



                          reported to                            



                          cause a negative                           



                          bias, interpret                           



                          results relative                           



                          to patient's use                           



                          of biotin.                             

 

             Lab Interpretation Normal                                 



             (test code = 27823-3)                                        



[facility]





             Test Item    Value        Reference Range Interpretation Comments

 

             LIPASE (test code = 1065864991) 120 U/L      0-220                 

    

 

             Lab Interpretation (test code = Normal                             

    



             31686-6)                                            



[facility] [id number] 
03:38:23





             Test Item    Value        Reference Range Interpretation Comments

 

             APTT Patient (test              See_Comment                [Automat

ed



             code = 3173-2)                                        message] The



                                                                 system which



                                                                 generated this



                                                                 result



                                                                 transmitted



                                                                 reference range

:



                                                                 23 - 38 Seconds

.



                                                                 The reference



                                                                 range was not



                                                                 used to interpr

et



                                                                 this result as



                                                                 normal/abnormal

.

 

             MAL (test code = MAL) The [facility] patient                           



                          population mean                           



                          normal value for                           



                          aPTT is 30                             



                          seconds.                               

 

             Lab Interpretation Normal                                 



             (test code = 71296-9)                                        



[facility] [id number] 
03:38:23





             Test Item    Value        Reference Range Interpretation Comments

 

             APTT Patient (test              See_Comment                [Automat

ed



             code = 3173-2)                                        message] The



                                                                 system which



                                                                 generated this



                                                                 result



                                                                 transmitted



                                                                 reference range

:



                                                                 23 - 38 Seconds

.



                                                                 The reference



                                                                 range was not



                                                                 used to interpr

et



                                                                 this result as



                                                                 normal/abnormal

.

 

             MAL (test code = MAL) The [facility] patient                           



                          population mean                           



                          normal value for                           



                          aPTT is 30                             



                          seconds.                               

 

             Lab Interpretation Normal                                 



             (test code = 67303-1)                                        



[facility] [id number] 03:36:22





             Test Item    Value        Reference Range Interpretation Comments

 

             PROTIME PATIENT (test              See_Comment                [Auto

mated message]



             code = 5964-2)                                        The system wh

ich



                                                                 generated this 

result



                                                                 transmitted ref

erence



                                                                 range: 12.0 - 1

4.7



                                                                 Seconds. The re

ference



                                                                 range was not u

sed to



                                                                 interpret this 

result



                                                                 as normal/abnor

mal.

 

             INR (test code = 6301-6)                                        Nor

mal INR <1.1;



                                                                 Warfarin Therap

eutic



                                                                 range 2.0 to 3.

0 or



                                                                 2.5 to 3.5, dep

ending



                                                                 upon the indica

tions.

 

             Lab Interpretation (test Normal                                 



             code = 75684-0)                                        



[facility] WITH [id number] 03:35:42





             Test Item    Value        Reference Range Interpretation Comments

 

             WBC (test code =              See_Comment  H             [Automated



             2090-2)                                             message] The



                                                                 system which



                                                                 generated this



                                                                 result transmit

huma



                                                                 reference range

:



                                                                 4.20 - 10.70



                                                                 10*3/?L. The



                                                                 reference range



                                                                 was not used to



                                                                 interpret this



                                                                 result as



                                                                 normal/abnormal

.

 

             RBC (test code =              See_Comment  H             [Automated



             789-8)                                              message] The



                                                                 system which



                                                                 generated this



                                                                 result transmit

huma



                                                                 reference range

:



                                                                 4.26 - 5.52



                                                                 10*6/?L. The



                                                                 reference range



                                                                 was not used to



                                                                 interpret this



                                                                 result as



                                                                 normal/abnormal

.

 

             HGB (test code = 17.8 g/dL    12.2-16.4    H            



             718-7)                                              

 

             HCT (test code = 51.7 %       38.4-49.3    H            



             4544-3)                                             

 

             MCV (test code = 86.9 fL      81.7-95.6                 



             787-2)                                              

 

             MCH (test code = 29.9 pg      26.1-32.7                 



             785-6)                                              

 

             MCHC (test code = 34.4 g/dL    31.2-35.0                 



             786-4)                                              

 

             RDW-SD (test code = 44.7 fL      38.5-51.6                 



             90215-4)                                            

 

             RDW-CV (test code = 14.2 %       12.1-15.4                 



             788-0)                                              

 

             PLT (test code =              See_Comment  H             [Automated



             777-3)                                              message] The



                                                                 system which



                                                                 generated this



                                                                 result transmit

huma



                                                                 reference range

:



                                                                 150 - 328 10*3/

?L.



                                                                 The reference



                                                                 range was not u

sed



                                                                 to interpret th

is



                                                                 result as



                                                                 normal/abnormal

.

 

             MPV (test code = 11.5 fL      9.8-13.0                  



             35226-1)                                            

 

             NRBC/100 WBC (test              See_Comment                [Automat

ed



             code = 0036147555)                                        message] 

The



                                                                 system which



                                                                 generated this



                                                                 result transmit

huma



                                                                 reference range

:



                                                                 0.0 - 10.0 /100



                                                                 WBCs. The



                                                                 reference range



                                                                 was not used to



                                                                 interpret this



                                                                 result as



                                                                 normal/abnormal

.

 

             NRBC x10^3 (test code <0.01        See_Comment                [Auto

mated



             = 0810653833)                                        message] The



                                                                 system which



                                                                 generated this



                                                                 result transmit

huma



                                                                 reference range

:



                                                                 10*3/?L. The



                                                                 reference range



                                                                 was not used to



                                                                 interpret this



                                                                 result as



                                                                 normal/abnormal

.

 

             GRAN MAT (NEUT) % 85.2 %                                 



             (test code = 770-8)                                        

 

             IMM GRAN % (test code 0.90 %                                 



             = 5996616722)                                        

 

             LYMPH % (test code = 5.9 %                                  



             736-9)                                              

 

             MONO % (test code = 7.8 %                                  



             5905-5)                                             

 

             EOS % (test code = 0.0 %                                  



             713-8)                                              

 

             BASO % (test code = 0.2 %                                  



             706-2)                                              

 

             GRAN MAT x10^3(ANC) 12.85 10*3/uL 1.99-6.95    H            



             (test code =                                        



             2890211305)                                         

 

             IMM GRAN x10^3 (test 0.13 10*3/uL 0.00-0.06    H            



             code = 5596265772)                                        

 

             LYMPH x10^3 (test code 0.89 10*3/uL 1.09-3.23    L            



             = 731-0)                                            

 

             MONO x10^3 (test code 1.17 10*3/uL 0.36-1.02    H            



             = 742-7)                                            

 

             EOS x10^3 (test code = <0.03        0.06-0.53    L            



             711-2)                                              

 

             BASO x10^3 (test code 0.03 10*3/uL 0.01-0.09                 



             = 704-7)                                            

 

             Lab Interpretation Abnormal                               



             (test code = 34893-2)                                        



[id number] 03:22:23





             Test Item    Value        Reference Range Interpretation Comments

 

             LACTIC ACID (test code = 4.52 mmol/L  0.50-2.20    H            



             5772830697)                                         

 

             Lab Interpretation (test code = Abnormal                           

    



             76330-1)                                            



[facility] METABOLIC PANEL (NA, K, CL, CO2, 
GLUCOSE, BUN, CREATININE, CA)2022 11:13:09





             Test Item    Value        Reference Range Interpretation Comments

 

             NA (test code = 137 mmol/L   135-145                   



             [id number])                                         

 

             K (test code = 4.8 mmol/L   3.5-5.0                   



             9665814478)                                         

 

             CL (test code = 101 mmol/L                       



             0278195193)                                         

 

             CO2 TOTAL (test code = 24 mmol/L    23-31                     



             7578243357)                                         

 

             AGAP (test code =              2-16                      



             1803765067)                                         

 

             BUN (test code = 17 mg/dL     7-23                      



             3859041861)                                         

 

             GLUCOSE (test code = 323 mg/dL           H            



             7437710961)                                         

 

             CREATININE (test code = 0.56 mg/dL   0.60-1.25    L            



             [id number])                                         

 

             CALCIUM (test code = 9.6 mg/dL    8.6-10.6                  



             [id number])                                         

 

             eGFR (test code =              mL/min/1.73m2              



             2003173912)                                         

 

             MAL (test code = MAL) Association of                           



                          Glomerular Filtration                           



                          Rate (GFR) and Staging                           



                          of Kidney Disease*                           



                          +---------------------                           



                          --+-------------------                           



                          --+-------------------                           



                          ------+| GFR                           



                          (mL/min/1.73 m2) ?|                           



                          With Kidney Damage ?|                           



                          ?Without Kidney                           



                          Damage+---------------                           



                          --------+-------------                           



                          --------+-------------                           



                          ------------+| ?>90 ?                           



                          ? ? ? ? ? ? ? ?|                           



                          ?Stage one ? ? ? ? ?|                           



                          ? Normal ? ? ? ? ? ? ?                           



                          ?+--------------------                           



                          ---+------------------                           



                          ---+------------------                           



                          -------+| ?60-89 ? ? ?                           



                          ? ? ? ? ?| ?Stage two                           



                          ? ? ? ? ?| ? Decreased                           



                          GFR ? ? ? ?                            



                          +---------------------                           



                          --+-------------------                           



                          --+-------------------                           



                          ------+| ?30-59 ? ? ?                           



                          ? ? ? ? ?| ?Stage                           



                          three ? ? ? ?| ? Stage                           



                          three ? ? ? ? ?                           



                          +---------------------                           



                          --+-------------------                           



                          --+-------------------                           



                          ------+| ?15-29 ? ? ?                           



                          ? ? ? ? ?| ?Stage four                           



                          ? ? ? ? | ? Stage four                           



                          ? ? ? ? ?                              



                          ?+--------------------                           



                          ---+------------------                           



                          ---+------------------                           



                          -------+| ?<15 (or                           



                          dialysis) ? ?| ?Stage                           



                          five ? ? ? ? | ? Stage                           



                          five ? ? ? ? ?                           



                          ?+--------------------                           



                          ---+------------------                           



                          ---+------------------                           



                          -------+ *Each stage                           



                          assumes the associated                           



                          GFR level has been in                           



                          effect for at least                           



                          three months. ?Stages                           



                          1 to 5, with or                           



                          without kidney                           



                          disease, indicate                           



                          chronic kidney                           



                          disease. Notes:                           



                          Determination of                           



                          stages one and two                           



                          (with eGFR                             



                          >59mL/min/1.73 m2)                           



                          requires estimation of                           



                          kidney damage for at                           



                          least three months as                           



                          defined by structural                           



                          or functional                           



                          abnormalities of the                           



                          kidney, manifested by                           



                          either:Pathological                           



                          abnormalities or                           



                          Markers of kidney                           



                          damage (including                           



                          abnormalities in the                           



                          composition of the                           



                          blood or urine or                           



                          abnormalities in                           



                          imaging tests).                           

 

             Lab Interpretation Abnormal                               



             (test code = 00471-7)                                        



[facility] WITH [id number] 10:49:07





             Test Item    Value        Reference Range Interpretation Comments

 

             WBC (test code =              See_Comment  H             [Automated



             6690-2)                                             message] The sy

stem



                                                                 which generated



                                                                 this result



                                                                 transmitted



                                                                 reference range

:



                                                                 4.20 - 10.70



                                                                 10*3/?L. The



                                                                 reference range

 was



                                                                 not used to



                                                                 interpret this



                                                                 result as



                                                                 normal/abnormal

.

 

             RBC (test code =              See_Comment                [Automated



             789-8)                                              message] The sy

stem



                                                                 which generated



                                                                 this result



                                                                 transmitted



                                                                 reference range

:



                                                                 4.26 - 5.52



                                                                 10*6/?L. The



                                                                 reference range

 was



                                                                 not used to



                                                                 interpret this



                                                                 result as



                                                                 normal/abnormal

.

 

             HGB (test code = 15.6 g/dL    12.2-16.4                 



             718-7)                                              

 

             HCT (test code = 46.6 %       38.4-49.3                 



             4544-3)                                             

 

             MCV (test code = 88.3 fL      81.7-95.6                 



             787-2)                                              

 

             MCH (test code = 29.5 pg      26.1-32.7                 



             785-6)                                              

 

             MCHC (test code = 33.5 g/dL    31.2-35.0                 



             786-4)                                              

 

             RDW-SD (test code = 46.7 fL      38.5-51.6                 



             84863-0)                                            

 

             RDW-CV (test code = 14.5 %       12.1-15.4                 



             788-0)                                              

 

             PLT (test code =              See_Comment  H             [Automated



             777-3)                                              message] The sy

stem



                                                                 which generated



                                                                 this result



                                                                 transmitted



                                                                 reference range

:



                                                                 150 - 328 10*3/

?L.



                                                                 The reference r

zhen



                                                                 was not used to



                                                                 interpret this



                                                                 result as



                                                                 normal/abnormal

.

 

             MPV (test code = 10.2 fL      9.8-13.0                  



             86625-1)                                            

 

             NRBC/100 WBC (test              See_Comment                [Automat

ed



             code = [id number])                                        message] 

The system



                                                                 which generated



                                                                 this result



                                                                 transmitted



                                                                 reference range

:



                                                                 0.0 - 10.0 /100



                                                                 WBCs. The refer

ence



                                                                 range was not u

sed



                                                                 to interpret th

is



                                                                 result as



                                                                 normal/abnormal

.

 

             NRBC x10^3 (test code <0.01        See_Comment                [Auto

mated



             = [id number])                                        message] The s

ystem



                                                                 which generated



                                                                 this result



                                                                 transmitted



                                                                 reference range

:



                                                                 10*3/?L. The



                                                                 reference range

 was



                                                                 not used to



                                                                 interpret this



                                                                 result as



                                                                 normal/abnormal

.

 

             GRAN MAT (NEUT) % 70.2 %                                 



             (test code = 770-8)                                        

 

             IMM GRAN % (test code 0.80 %                                 



             = [id number])                                        

 

             LYMPH % (test code = 18.0 %                                 



             736-9)                                              

 

             MONO % (test code = 8.5 %                                  



             5905-5)                                             

 

             EOS % (test code = 2.1 %                                  



             713-8)                                              

 

             BASO % (test code = 0.4 %                                  



             706-2)                                              

 

             GRAN MAT x10^3(ANC) 7.63 10*3/uL 1.99-6.95    H            



             (test code =                                        



             4743736209)                                         

 

             IMM GRAN x10^3 (test 0.09 10*3/uL 0.00-0.06    H            



             code = 3947065941)                                        

 

             LYMPH x10^3 (test code 1.96 10*3/uL 1.09-3.23                 



             = 731-0)                                            

 

             MONO x10^3 (test code 0.92 10*3/uL 0.36-1.02                 



             = 742-7)                                            

 

             EOS x10^3 (test code = 0.23 10*3/uL 0.06-0.53                 



             711-2)                                              

 

             BASO x10^3 (test code 0.04 10*3/uL 0.01-0.09                 



             = 704-7)                                            

 

             Lab Interpretation Abnormal                               



             (test code = 11369-4)                                        



[facility] METABOLIC PANEL (NA, K, CL, CO2, 
GLUCOSE, BUN, CREATININE, CA)2022 09:48:58





             Test Item    Value        Reference Range Interpretation Comments

 

             NA (test code = 135 mmol/L   135-145                   



             7318381959)                                         

 

             K (test code = 4.7 mmol/L   3.5-5.0                   



             7153031049)                                         

 

             CL (test code = 101 mmol/L                       



             2115330961)                                         

 

             CO2 TOTAL (test code = 23 mmol/L    23-31                     



             7468059006)                                         

 

             AGAP (test code =              2-16                      



             0776502080)                                         

 

             BUN (test code = 18 mg/dL     7-23                      



             7490740372)                                         

 

             GLUCOSE (test code = 346 mg/dL           H            



             9133684015)                                         

 

             CREATININE (test code = 0.64 mg/dL   0.60-1.25                 



             5758825497)                                         

 

             CALCIUM (test code = 9.1 mg/dL    8.6-10.6                  



             4680010026)                                         

 

             eGFR (test code =              mL/min/1.73m2              



             4713184022)                                         

 

             MAL (test code = MAL) Association of                           



                          Glomerular Filtration                           



                          Rate (GFR) and Staging                           



                          of Kidney Disease*                           



                          +---------------------                           



                          --+-------------------                           



                          --+-------------------                           



                          ------+| GFR                           



                          (mL/min/1.73 m2) ?|                           



                          With Kidney Damage ?|                           



                          ?Without Kidney                           



                          Damage+---------------                           



                          --------+-------------                           



                          --------+-------------                           



                          ------------+| ?>90 ?                           



                          ? ? ? ? ? ? ? ?|                           



                          ?Stage one ? ? ? ? ?|                           



                          ? Normal ? ? ? ? ? ? ?                           



                          ?+--------------------                           



                          ---+------------------                           



                          ---+------------------                           



                          -------+| ?60-89 ? ? ?                           



                          ? ? ? ? ?| ?Stage two                           



                          ? ? ? ? ?| ? Decreased                           



                          GFR ? ? ? ?                            



                          +---------------------                           



                          --+-------------------                           



                          --+-------------------                           



                          ------+| ?30-59 ? ? ?                           



                          ? ? ? ? ?| ?Stage                           



                          three ? ? ? ?| ? Stage                           



                          three ? ? ? ? ?                           



                          +---------------------                           



                          --+-------------------                           



                          --+-------------------                           



                          ------+| ?15-29 ? ? ?                           



                          ? ? ? ? ?| ?Stage four                           



                          ? ? ? ? | ? Stage four                           



                          ? ? ? ? ?                              



                          ?+--------------------                           



                          ---+------------------                           



                          ---+------------------                           



                          -------+| ?<15 (or                           



                          dialysis) ? ?| ?Stage                           



                          five ? ? ? ? | ? Stage                           



                          five ? ? ? ? ?                           



                          ?+--------------------                           



                          ---+------------------                           



                          ---+------------------                           



                          -------+ *Each stage                           



                          assumes the associated                           



                          GFR level has been in                           



                          effect for at least                           



                          three months. ?Stages                           



                          1 to 5, with or                           



                          without kidney                           



                          disease, indicate                           



                          chronic kidney                           



                          disease. Notes:                           



                          Determination of                           



                          stages one and two                           



                          (with eGFR                             



                          >59mL/min/1.73 m2)                           



                          requires estimation of                           



                          kidney damage for at                           



                          least three months as                           



                          defined by structural                           



                          or functional                           



                          abnormalities of the                           



                          kidney, manifested by                           



                          either:Pathological                           



                          abnormalities or                           



                          Markers of kidney                           



                          damage (including                           



                          abnormalities in the                           



                          composition of the                           



                          blood or urine or                           



                          abnormalities in                           



                          imaging tests).                           

 

             Lab Interpretation Abnormal                               



             (test code = 39584-7)                                        



[facility] WITH [id number] 09:06:55





             Test Item    Value        Reference Range Interpretation Comments

 

             WBC (test code =              See_Comment                [Automated



             4959-2)                                             message] The sy

stem



                                                                 which generated



                                                                 this result



                                                                 transmitted



                                                                 reference range

:



                                                                 4.20 - 10.70



                                                                 10*3/?L. The



                                                                 reference range

 was



                                                                 not used to



                                                                 interpret this



                                                                 result as



                                                                 normal/abnormal

.

 

             RBC (test code =              See_Comment                [Automated



             316-8)                                              message] The sy

stem



                                                                 which generated



                                                                 this result



                                                                 transmitted



                                                                 reference range

:



                                                                 4.26 - 5.52



                                                                 10*6/?L. The



                                                                 reference range

 was



                                                                 not used to



                                                                 interpret this



                                                                 result as



                                                                 normal/abnormal

.

 

             HGB (test code = 15.0 g/dL    12.2-16.4                 



             718-7)                                              

 

             HCT (test code = 44.8 %       38.4-49.3                 



             4544-3)                                             

 

             MCV (test code = 88.2 fL      81.7-95.6                 



             787-2)                                              

 

             MCH (test code = 29.5 pg      26.1-32.7                 



             785-6)                                              

 

             MCHC (test code = 33.5 g/dL    31.2-35.0                 



             786-4)                                              

 

             RDW-SD (test code = 47.7 fL      38.5-51.6                 



             66381-2)                                            

 

             RDW-CV (test code = 14.6 %       12.1-15.4                 



             788-0)                                              

 

             PLT (test code =              See_Comment                [Automated



             777-3)                                              message] The sy

stem



                                                                 which generated



                                                                 this result



                                                                 transmitted



                                                                 reference range

:



                                                                 150 - 328 10*3/

?L.



                                                                 The reference r

zhen



                                                                 was not used to



                                                                 interpret this



                                                                 result as



                                                                 normal/abnormal

.

 

             MPV (test code = 9.9 fL       9.8-13.0                  



             99626-9)                                            

 

             NRBC/100 WBC (test              See_Comment                [Automat

ed



             code = 6984652918)                                        message] 

The system



                                                                 which generated



                                                                 this result



                                                                 transmitted



                                                                 reference range

:



                                                                 0.0 - 10.0 /100



                                                                 WBCs. The refer

ence



                                                                 range was not u

sed



                                                                 to interpret th

is



                                                                 result as



                                                                 normal/abnormal

.

 

             NRBC x10^3 (test code <0.01        See_Comment                [Auto

mated



             = [id number])                                        message] The s

ystem



                                                                 which generated



                                                                 this result



                                                                 transmitted



                                                                 reference range

:



                                                                 10*3/?L. The



                                                                 reference range

 was



                                                                 not used to



                                                                 interpret this



                                                                 result as



                                                                 normal/abnormal

.

 

             GRAN MAT (NEUT) % 62.6 %                                 



             (test code = 770-8)                                        

 

             IMM GRAN % (test code 1.30 %                                 



             = 9839379802)                                        

 

             LYMPH % (test code = 23.2 %                                 



             736-9)                                              

 

             MONO % (test code = 9.6 %                                  



             5905-5)                                             

 

             EOS % (test code = 2.9 %                                  



             713-8)                                              

 

             BASO % (test code = 0.4 %                                  



             706-2)                                              

 

             GRAN MAT x10^3(ANC) 5.85 10*3/uL 1.99-6.95                 



             (test code =                                        



             9805367179)                                         

 

             IMM GRAN x10^3 (test 0.12 10*3/uL 0.00-0.06    H            



             code = 6488736317)                                        

 

             LYMPH x10^3 (test code 2.17 10*3/uL 1.09-3.23                 



             = 731-0)                                            

 

             MONO x10^3 (test code 0.90 10*3/uL 0.36-1.02                 



             = 742-7)                                            

 

             EOS x10^3 (test code = 0.27 10*3/uL 0.06-0.53                 



             711-2)                                              

 

             BASO x10^3 (test code 0.04 10*3/uL 0.01-0.09                 



             = 704-7)                                            

 

             Lab Interpretation Abnormal                               



             (test code = 75472-7)                                        



[facility] CULTURE [id number] 02:01:46





             Test Item    Value        Reference Range Interpretation Comments

 

             Blood Culture-Aerobic No organisms No growth                 Previo

us



             (test code = 17928-3) isolated                               prelim

inary



                                                                 verified result



                                                                 was Culture In



                                                                 Progress on



                                                                 2022 at 00

01



                                                                 CDTPrevious



                                                                 preliminary



                                                                 verified result



                                                                 was No growth a

t



                                                                 24 hours on



                                                                 2022 at 21

01



                                                                 CDTPrevious



                                                                 preliminary



                                                                 verified result



                                                                 was No growth a

t



                                                                 48 hours on



                                                                 2022 at 21

01



                                                                 CDTPrevious



                                                                 preliminary



                                                                 verified result



                                                                 was No growth a

t



                                                                 72 hours on



                                                                 2022 at 21

01



                                                                 CDT

 

             Blood        No organisms No growth                 Previous



             Culture-Anaerobic isolated                               preliminar

y



             (test code = 50627-5)                                        verifi

ed result



                                                                 was Culture In



                                                                 Progress on



                                                                 2022 at 00

01



                                                                 CDTPrevious



                                                                 preliminary



                                                                 verified result



                                                                 was No growth a

t



                                                                 24 hours on



                                                                 2022 at 21

01



                                                                 CDTPrevious



                                                                 preliminary



                                                                 verified result



                                                                 was No growth a

t



                                                                 48 hours on



                                                                 2022 at 21

01



                                                                 CDTPrevious



                                                                 preliminary



                                                                 verified result



                                                                 was No growth a

t



                                                                 72 hours on



                                                                 2022 at 21

01



                                                                 CDT

 

             Lab Interpretation Normal                                 



             (test code = 41728-9)                                        



[facility] CULTURE [id number] 02:01:46





             Test Item    Value        Reference Range Interpretation Comments

 

             Blood Culture-Aerobic No organisms No growth                 Previo

us



             (test code = 17928-3) isolated                               prelim

inary



                                                                 verified result



                                                                 was Culture In



                                                                 Progress on



                                                                 2022 at 00

01



                                                                 CDTPrevious



                                                                 preliminary



                                                                 verified result



                                                                 was No growth a

t



                                                                 24 hours on



                                                                 2022 at 21

01



                                                                 CDTPrevious



                                                                 preliminary



                                                                 verified result



                                                                 was No growth a

t



                                                                 48 hours on



                                                                 2022 at 21

01



                                                                 CDTPrevious



                                                                 preliminary



                                                                 verified result



                                                                 was No growth a

t



                                                                 72 hours on



                                                                 2022 at 21

01



                                                                 CDT

 

             Blood        No organisms No growth                 Previous



             Culture-Anaerobic isolated                               preliminar

y



             (test code = 80621-6)                                        verifi

ed result



                                                                 was Culture In



                                                                 Progress on



                                                                 2022 at 00

01



                                                                 CDTPrevious



                                                                 preliminary



                                                                 verified result



                                                                 was No growth a

t



                                                                 24 hours on



                                                                 2022 at 21

01



                                                                 CDTPrevious



                                                                 preliminary



                                                                 verified result



                                                                 was No growth a

t



                                                                 48 hours on



                                                                 2022 at 21

01



                                                                 CDTPrevious



                                                                 preliminary



                                                                 verified result



                                                                 was No growth a

t



                                                                 72 hours on



                                                                 2022 at 21

01



                                                                 CDT

 

             Lab Interpretation Normal                                 



             (test code = 29412-6)                                        



[id number]49





             Test Item    Value        Reference Range Interpretation Comments

 

             NT-proBNP (test code 117 pg/mL    See_Comment                [Autom

ated



             = 8102437191)                                        message] The



                                                                 system which



                                                                 generated this



                                                                 result



                                                                 transmitted



                                                                 reference range

:



                                                                 <=125. The



                                                                 reference range



                                                                 was not used to



                                                                 interpret this



                                                                 result as



                                                                 normal/abnormal

.

 

             MAL (test code = MAL) Biotin has been                           



                          reported to                            



                          cause a negative                           



                          bias, interpret                           



                          results relative                           



                          to patient's use                           



                          of biotin.                             

 

             Lab Interpretation Normal                                 



             (test code = 60900-6)                                        



[facility]. METABOLIC PANEL ([id number] 
12:10:25





             Test Item    Value        Reference Range Interpretation Comments

 

             NA (test code = 137 mmol/L   135-145                   



             3453691782)                                         

 

             K (test code = 4.6 mmol/L   3.5-5.0                   



             8830686061)                                         

 

             CL (test code = 108 mmol/L                       



             4451894316)                                         

 

             CO2 TOTAL (test code = 20 mmol/L    23-31        L            



             4444516614)                                         

 

             AGAP (test code =              2-16                      



             6159460960)                                         

 

             BUN (test code = 12 mg/dL     7-23                      



             4691294834)                                         

 

             GLUCOSE (test code = 186 mg/dL           H            



             4549946140)                                         

 

             CREATININE (test code = 0.49 mg/dL   0.60-1.25    L            



             7521048722)                                         

 

             TOTAL BILI (test code = 0.7 mg/dL    0.1-1.1                   



             1234955810)                                         

 

             CALCIUM (test code = 8.7 mg/dL    8.6-10.6                  



             8605385478)                                         

 

             T PROTEIN (test code = 6.9 g/dL     6.3-8.2                   



             0833004755)                                         

 

             ALBUMIN (test code = 3.7 g/dL     3.5-5.0                   



             8518800698)                                         

 

             ALK PHOS (test code = 114 U/L                          



             9914420114)                                         

 

             ALTv (test code = 75 U/L       5-50         H            



             1742-6)                                             

 

             AST(SGOT) (test code = 27 U/L       13-40                     



             5503372718)                                         

 

             eGFR (test code =              mL/min/1.73m2              



             3754004852)                                         

 

             MAL (test code = MAL) Association of                           



                          Glomerular Filtration                           



                          Rate (GFR) and Staging                           



                          of Kidney Disease*                           



                          +---------------------                           



                          --+-------------------                           



                          --+-------------------                           



                          ------+| GFR                           



                          (mL/min/1.73 m2) ?|                           



                          With Kidney Damage ?|                           



                          ?Without Kidney                           



                          Damage+---------------                           



                          --------+-------------                           



                          --------+-------------                           



                          ------------+| ?>90 ?                           



                          ? ? ? ? ? ? ? ?|                           



                          ?Stage one ? ? ? ? ?|                           



                          ? Normal ? ? ? ? ? ? ?                           



                          ?+--------------------                           



                          ---+------------------                           



                          ---+------------------                           



                          -------+| ?60-89 ? ? ?                           



                          ? ? ? ? ?| ?Stage two                           



                          ? ? ? ? ?| ? Decreased                           



                          GFR ? ? ? ?                            



                          +---------------------                           



                          --+-------------------                           



                          --+-------------------                           



                          ------+| ?30-59 ? ? ?                           



                          ? ? ? ? ?| ?Stage                           



                          three ? ? ? ?| ? Stage                           



                          three ? ? ? ? ?                           



                          +---------------------                           



                          --+-------------------                           



                          --+-------------------                           



                          ------+| ?15-29 ? ? ?                           



                          ? ? ? ? ?| ?Stage four                           



                          ? ? ? ? | ? Stage four                           



                          ? ? ? ? ?                              



                          ?+--------------------                           



                          ---+------------------                           



                          ---+------------------                           



                          -------+| ?<15 (or                           



                          dialysis) ? ?| ?Stage                           



                          five ? ? ? ? | ? Stage                           



                          five ? ? ? ? ?                           



                          ?+--------------------                           



                          ---+------------------                           



                          ---+------------------                           



                          -------+ *Each stage                           



                          assumes the associated                           



                          GFR level has been in                           



                          effect for at least                           



                          three months. ?Stages                           



                          1 to 5, with or                           



                          without kidney                           



                          disease, indicate                           



                          chronic kidney                           



                          disease. Notes:                           



                          Determination of                           



                          stages one and two                           



                          (with eGFR                             



                          >59mL/min/1.73 m2)                           



                          requires estimation of                           



                          kidney damage for at                           



                          least three months as                           



                          defined by structural                           



                          or functional                           



                          abnormalities of the                           



                          kidney, manifested by                           



                          either:Pathological                           



                          abnormalities or                           



                          Markers of kidney                           



                          damage (including                           



                          abnormalities in the                           



                          composition of the                           



                          blood or urine or                           



                          abnormalities in                           



                          imaging tests).                           

 

             Lab Interpretation Abnormal                               



             (test code = 57890-6)                                        



[facility] WITH [id number] 11:14:25





             Test Item    Value        Reference Range Interpretation Comments

 

             WBC (test code =              See_Comment                [Automated



             6690-2)                                             message] The sy

stem



                                                                 which generated



                                                                 this result



                                                                 transmitted



                                                                 reference range

:



                                                                 4.20 - 10.70



                                                                 10*3/?L. The



                                                                 reference range

 was



                                                                 not used to



                                                                 interpret this



                                                                 result as



                                                                 normal/abnormal

.

 

             RBC (test code =              See_Comment                [Automated



             789-8)                                              message] The sy

stem



                                                                 which generated



                                                                 this result



                                                                 transmitted



                                                                 reference range

:



                                                                 4.26 - 5.52



                                                                 10*6/?L. The



                                                                 reference range

 was



                                                                 not used to



                                                                 interpret this



                                                                 result as



                                                                 normal/abnormal

.

 

             HGB (test code = 14.8 g/dL    12.2-16.4                 



             718-7)                                              

 

             HCT (test code = 44.8 %       38.4-49.3                 



             4544-3)                                             

 

             MCV (test code = 89.1 fL      81.7-95.6                 



             787-2)                                              

 

             MCH (test code = 29.4 pg      26.1-32.7                 



             785-6)                                              

 

             MCHC (test code = 33.0 g/dL    31.2-35.0                 



             786-4)                                              

 

             RDW-SD (test code = 48.7 fL      38.5-51.6                 



             71226-6)                                            

 

             RDW-CV (test code = 15.0 %       12.1-15.4                 



             788-0)                                              

 

             PLT (test code =              See_Comment                [Automated



             777-3)                                              message] The sy

stem



                                                                 which generated



                                                                 this result



                                                                 transmitted



                                                                 reference range

:



                                                                 150 - 328 10*3/

?L.



                                                                 The reference r

zhen



                                                                 was not used to



                                                                 interpret this



                                                                 result as



                                                                 normal/abnormal

.

 

             MPV (test code = 10.4 fL      9.8-13.0                  



             71314-2)                                            

 

             NRBC/100 WBC (test              See_Comment                [Automat

ed



             code = 3562544375)                                        message] 

The system



                                                                 which generated



                                                                 this result



                                                                 transmitted



                                                                 reference range

:



                                                                 0.0 - 10.0 /100



                                                                 WBCs. The refer

ence



                                                                 range was not u

sed



                                                                 to interpret th

is



                                                                 result as



                                                                 normal/abnormal

.

 

             NRBC x10^3 (test code <0.01        See_Comment                [Auto

mated



             = [id number])                                        message] The s

ystem



                                                                 which generated



                                                                 this result



                                                                 transmitted



                                                                 reference range

:



                                                                 10*3/?L. The



                                                                 reference range

 was



                                                                 not used to



                                                                 interpret this



                                                                 result as



                                                                 normal/abnormal

.

 

             GRAN MAT (NEUT) % 65.5 %                                 



             (test code = 770-8)                                        

 

             IMM GRAN % (test code 0.80 %                                 



             = 6416395719)                                        

 

             LYMPH % (test code = 20.9 %                                 



             736-9)                                              

 

             MONO % (test code = 9.7 %                                  



             5905-5)                                             

 

             EOS % (test code = 2.7 %                                  



             713-8)                                              

 

             BASO % (test code = 0.4 %                                  



             706-2)                                              

 

             GRAN MAT x10^3(ANC) 5.41 10*3/uL 1.99-6.95                 



             (test code =                                        



             7417880952)                                         

 

             IMM GRAN x10^3 (test 0.07 10*3/uL 0.00-0.06    H            



             code = 7960324025)                                        

 

             LYMPH x10^3 (test code 1.73 10*3/uL 1.09-3.23                 



             = 731-0)                                            

 

             MONO x10^3 (test code 0.80 10*3/uL 0.36-1.02                 



             = 742-7)                                            

 

             EOS x10^3 (test code = 0.22 10*3/uL 0.06-0.53                 



             711-2)                                              

 

             BASO x10^3 (test code 0.03 10*3/uL 0.01-0.09                 



             = 704-7)                                            

 

             Lab Interpretation Abnormal                               



             (test code = 33231-9)                                        



[id number]44





             Test Item    Value        Reference Range Interpretation Comments

 

             NT-proBNP (test code 157 pg/mL    See_Comment  H             [Autom

ated



             = 1017870263)                                        message] The



                                                                 system which



                                                                 generated this



                                                                 result



                                                                 transmitted



                                                                 reference range

:



                                                                 <=125. The



                                                                 reference range



                                                                 was not used to



                                                                 interpret this



                                                                 result as



                                                                 normal/abnormal

.

 

             MAL (test code = MAL) Biotin has been                           



                          reported to                            



                          cause a negative                           



                          bias, interpret                           



                          results relative                           



                          to patient's use                           



                          of biotin.                             

 

             Lab Interpretation Abnormal                               



             (test code = 97120-6)                                        



[id number] 
13:08:45





             Test Item    Value        Reference Range Interpretation Comments

 

             NA (test code = 141 mmol/L   135-145                   



             4766472353)                                         

 

             K (test code = 4.2 mmol/L   3.5-5.0                   



             5185555143)                                         

 

             CL (test code = 110 mmol/L          H            



             4700873231)                                         

 

             CO2 TOTAL (test code = 24 mmol/L    23-31                     



             7786420522)                                         

 

             AGAP (test code =              2-16                      



             4003584646)                                         

 

             BUN (test code = 11 mg/dL     7-23                      



             8065379590)                                         

 

             GLUCOSE (test code = 142 mg/dL           H            



             5601798093)                                         

 

             CREATININE (test code = 0.61 mg/dL   0.60-1.25                 



             0735658740)                                         

 

             TOTAL BILI (test code = 0.7 mg/dL    0.1-1.1                   



             8557123480)                                         

 

             CALCIUM (test code = 8.5 mg/dL    8.6-10.6     L            



             2215581364)                                         

 

             T PROTEIN (test code = 6.7 g/dL     6.3-8.2                   



             2495733546)                                         

 

             ALBUMIN (test code = 3.6 g/dL     3.5-5.0                   



             1057391699)                                         

 

             ALK PHOS (test code = 120 U/L                          



             0296312290)                                         

 

             ALTv (test code = 88 U/L       5-50         H            



             2-6)                                             

 

             AST(SGOT) (test code = 27 U/L       13-40                     



             0148132079)                                         

 

             eGFR (test code =              mL/min/1.73m2              



             4437837552)                                         

 

             MAL (test code = MAL) Association of                           



                          Glomerular Filtration                           



                          Rate (GFR) and Staging                           



                          of Kidney Disease*                           



                          +---------------------                           



                          --+-------------------                           



                          --+-------------------                           



                          ------+| GFR                           



                          (mL/min/1.73 m2) ?|                           



                          With Kidney Damage ?|                           



                          ?Without Kidney                           



                          Damage+---------------                           



                          --------+-------------                           



                          --------+-------------                           



                          ------------+| ?>90 ?                           



                          ? ? ? ? ? ? ? ?|                           



                          ?Stage one ? ? ? ? ?|                           



                          ? Normal ? ? ? ? ? ? ?                           



                          ?+--------------------                           



                          ---+------------------                           



                          ---+------------------                           



                          -------+| ?60-89 ? ? ?                           



                          ? ? ? ? ?| ?Stage two                           



                          ? ? ? ? ?| ? Decreased                           



                          GFR ? ? ? ?                            



                          +---------------------                           



                          --+-------------------                           



                          --+-------------------                           



                          ------+| ?30-59 ? ? ?                           



                          ? ? ? ? ?| ?Stage                           



                          three ? ? ? ?| ? Stage                           



                          three ? ? ? ? ?                           



                          +---------------------                           



                          --+-------------------                           



                          --+-------------------                           



                          ------+| ?15-29 ? ? ?                           



                          ? ? ? ? ?| ?Stage four                           



                          ? ? ? ? | ? Stage four                           



                          ? ? ? ? ?                              



                          ?+--------------------                           



                          ---+------------------                           



                          ---+------------------                           



                          -------+| ?<15 (or                           



                          dialysis) ? ?| ?Stage                           



                          five ? ? ? ? | ? Stage                           



                          five ? ? ? ? ?                           



                          ?+--------------------                           



                          ---+------------------                           



                          ---+------------------                           



                          -------+ *Each stage                           



                          assumes the associated                           



                          GFR level has been in                           



                          effect for at least                           



                          three months. ?Stages                           



                          1 to 5, with or                           



                          without kidney                           



                          disease, indicate                           



                          chronic kidney                           



                          disease. Notes:                           



                          Determination of                           



                          stages one and two                           



                          (with eGFR                             



                          >59mL/min/1.73 m2)                           



                          requires estimation of                           



                          kidney damage for at                           



                          least three months as                           



                          defined by structural                           



                          or functional                           



                          abnormalities of the                           



                          kidney, manifested by                           



                          either:Pathological                           



                          abnormalities or                           



                          Markers of kidney                           



                          damage (including                           



                          abnormalities in the                           



                          composition of the                           



                          blood or urine or                           



                          abnormalities in                           



                          imaging tests).                           

 

             Lab Interpretation Abnormal                               



             (test code = 81874-1)                                        



[facility]





             Test Item    Value        Reference Range Interpretation Comments

 

             MAGNESIUM (test code = 3257511361) 1.7 mg/dL    1.7-2.4            

       

 

             Lab Interpretation (test code = Normal                             

    



             28584-8)                                            



[id number] 11:57:56





             Test Item    Value        Reference Range Interpretation Comments

 

             WBC (test code =              See_Comment                [Automated



             6690-2)                                             message] The sy

stem



                                                                 which generated



                                                                 this result



                                                                 transmitted



                                                                 reference range

:



                                                                 4.20 - 10.70



                                                                 10*3/?L. The



                                                                 reference range

 was



                                                                 not used to



                                                                 interpret this



                                                                 result as



                                                                 normal/abnormal

.

 

             RBC (test code =              See_Comment                [Automated



             789-8)                                              message] The sy

stem



                                                                 which generated



                                                                 this result



                                                                 transmitted



                                                                 reference range

:



                                                                 4.26 - 5.52



                                                                 10*6/?L. The



                                                                 reference range

 was



                                                                 not used to



                                                                 interpret this



                                                                 result as



                                                                 normal/abnormal

.

 

             HGB (test code = 14.6 g/dL    12.2-16.4                 



             718-7)                                              

 

             HCT (test code = 43.5 %       38.4-49.3                 



             4544-3)                                             

 

             MCV (test code = 88.4 fL      81.7-95.6                 



             787-2)                                              

 

             MCH (test code = 29.7 pg      26.1-32.7                 



             785-6)                                              

 

             MCHC (test code = 33.6 g/dL    31.2-35.0                 



             786-4)                                              

 

             RDW-SD (test code = 48.9 fL      38.5-51.6                 



             78575-9)                                            

 

             RDW-CV (test code = 14.9 %       12.1-15.4                 



             788-0)                                              

 

             PLT (test code =              See_Comment                [Automated



             777-3)                                              message] The sy

stem



                                                                 which generated



                                                                 this result



                                                                 transmitted



                                                                 reference range

:



                                                                 150 - 328 10*3/

?L.



                                                                 The reference r

zhen



                                                                 was not used to



                                                                 interpret this



                                                                 result as



                                                                 normal/abnormal

.

 

             MPV (test code = 9.4 fL       9.8-13.0     L            



             28841-3)                                            

 

             NRBC/100 WBC (test              See_Comment                [Automat

ed



             code = 6372737963)                                        message] 

The system



                                                                 which generated



                                                                 this result



                                                                 transmitted



                                                                 reference range

:



                                                                 0.0 - 10.0 /100



                                                                 WBCs. The refer

ence



                                                                 range was not u

sed



                                                                 to interpret th

is



                                                                 result as



                                                                 normal/abnormal

.

 

             NRBC x10^3 (test code <0.01        See_Comment                [Auto

mated



             = [id number])                                        message] The s

ystem



                                                                 which generated



                                                                 this result



                                                                 transmitted



                                                                 reference range

:



                                                                 10*3/?L. The



                                                                 reference range

 was



                                                                 not used to



                                                                 interpret this



                                                                 result as



                                                                 normal/abnormal

.

 

             GRAN MAT (NEUT) % 63.9 %                                 



             (test code = 770-8)                                        

 

             IMM GRAN % (test code 0.70 %                                 



             = [id number])                                        

 

             LYMPH % (test code = 22.7 %                                 



             736-9)                                              

 

             MONO % (test code = 9.6 %                                  



             5905-5)                                             

 

             EOS % (test code = 2.7 %                                  



             713-8)                                              

 

             BASO % (test code = 0.4 %                                  



             706-2)                                              

 

             GRAN MAT x10^3(ANC) 4.75 10*3/uL 1.99-6.95                 



             (test code =                                        



             5848579714)                                         

 

             IMM GRAN x10^3 (test 0.05 10*3/uL 0.00-0.06                 



             code = 7271472508)                                        

 

             LYMPH x10^3 (test code 1.69 10*3/uL 1.09-3.23                 



             = 731-0)                                            

 

             MONO x10^3 (test code 0.71 10*3/uL 0.36-1.02                 



             = 742-7)                                            

 

             EOS x10^3 (test code = 0.20 10*3/uL 0.06-0.53                 



             711-2)                                              

 

             BASO x10^3 (test code 0.03 10*3/uL 0.01-0.09                 



             = 704-7)                                            

 

             Lab Interpretation Abnormal                               



             (test code = 21382-9)                                        



[id number]





             Test Item    Value        Reference Range Interpretation Comments

 

             VIT B12 (test code = 249 pg/mL    240-930                   



             [id number])                                         

 

             MAL (test code = MAL) Biotin has been                           



                          reported to cause a                           



                          positive bias,                           



                          interpret results                           



                          relative to                            



                          patient's use of                           



                          biotin.                                

 

             Lab Interpretation (test Normal                                 



             code = 97276-4)                                        



[facility] [id number]:18:39





             Test Item    Value        Reference Range Interpretation Comments

 

             VIT B12 (test code = 249 pg/mL    240-930                   



             [id number])                                         

 

             MAL (test code = MAL) Biotin has been                           



                          reported to cause a                           



                          positive bias,                           



                          interpret results                           



                          relative to                            



                          patient's use of                           



                          biotin.                                

 

             Lab Interpretation (test Normal                                 



             code = 69356-7)                                        



[id number] 17:30:28





             Test Item    Value        Reference Range Interpretation Comments

 

             VIT D 25OH (test code = 16 ng/mL     25-80        L            



             45890-8)                                            

 

             MAL (test code = MAL) Deficiency: <20                           



                          ng/mLInsufficiency:                           



                          20-24 ng/mLOptimal:                           



                          25-80 ng/mL                            

 

             Lab Interpretation (test Abnormal                               



             code = 79014-9)                                        



[id number] 17:30:28





             Test Item    Value        Reference Range Interpretation Comments

 

             VIT D 25OH (test code = 16 ng/mL     25-80        L            



             29431-2)                                            

 

             MAL (test code = MAL) Deficiency: <20                           



                          ng/mLInsufficiency:                           



                          20-24 ng/mLOptimal:                           



                          25-80 ng/mL                            

 

             Lab Interpretation (test Abnormal                               



             code = 80948-5)                                        



[id number]





             Test Item    Value        Reference    Interpretation Comments



                                       Range                     

 

             Procalcitonin (test 0.04 ng/mL   See_Comment                [Automa

huma



             code = 9144578479)                                        message] 

The



                                                                 system which



                                                                 generated this



                                                                 result



                                                                 transmitted



                                                                 reference



                                                                 range: <=0.07.



                                                                 The reference



                                                                 range was not



                                                                 used to



                                                                 interpret this



                                                                 result as



                                                                 normal/abnormal



                                                                 .

 

             MAL (test code = INTERPRETATION OF                           



             MAL)         PROCALCITONIN RESULTS                           



                          IN ADULTS >= 18 YEARS                           



                          OF AGE Initiation and                           



                          discontinuation of                           



                          antibiotics on                           



                          patients with                           



                          suspected or confirmed                           



                          Lower Respiratory                           



                          Tract Infection in                           



                          Adults >= 18 years of                           



                          age.                                   



                          +--------------+------                           



                          ----------+-----------                           



                          ----+-----------------                           



                          -----------+|Procalcit                           



                          onin |Interpretation                           



                          ?|Antibiotic ? ?                           



                          |Considerations ? ? ?                           



                          ? ? ? ? |ng/mL ? ? ? ?                           



                          | ? ? ? ? ? ? ?                           



                          ?|recommendation | ? ?                           



                          ? ? ? ? ? ? ? ? ? ? ?                           



                          ?                                      



                          +--------------+------                           



                          ----------+-----------                           



                          ----+-----------------                           



                          -----------+| <0.1 ? ?                           



                          ? ? | Bacterial ? ? ?|                           



                          Strongly ? ? ?| ? ? ?                           



                          ? ? ? ? ? ? ? ? ? ? ?                           



                          | ? ? ? ? ? ? ?|                           



                          infection very |                           



                          discouraged ? |                           



                          Overruling: ? ? ? ? ?                           



                          ? ? ? | ? ? ? ? ? ? ?|                           



                          unlikely ? ? ? | ? ? ?                           



                          ? ? ? ? | ? Clinically                           



                          unstable ? ? ?                           



                          +--------------+------                           



                          ----------+-----------                           



                          ----+ ? High risk for                           



                          adverse ? ? | <0.25 ?                           



                          ? ? ?| Bacterial ? ?                           



                          ?| Discouraged ? | ?                           



                          outcome ? ? ? ? ? ? ?                           



                          ? ? | ? ? ? ? ? ? ?|                           



                          infection ? ? ?| ? ? ?                           



                          ? ? ? ? | ? SEE                           



                          IMPORTANT NOTE ? ? ?                           



                          ?| ? ? ? ? ? ? ?|                           



                          unlikely ? ? ? | ? ? ?                           



                          ? ? ? ? | ? ? ? ? ? ?                           



                          ? ? ? ? ? ? ? ?                           



                          +--------------+------                           



                          ----------+-----------                           



                          ----+-----------------                           



                          -----------+| >=0.25 ?                           



                          ? ? | Bacterial ? ? ?|                           



                          Encouraged ? ?| ? ? ?                           



                          ? ? ? ? ? ? ? ? ? ? ?                           



                          | ? ? ? ? ? ? ?|                           



                          infection ? ? ?| ? ? ?                           



                          ? ? ? ? | ? ? ? ? ? ?                           



                          ? ? ? ? ? ? ? ? | ? ?                           



                          ? ? ? ? ?| likely ? ?                           



                          ? ? | ? ? ? ? ? ? ? |                           



                          Consider treatment                           



                          failure                                



                          ?+--------------+-----                           



                          -----------+----------                           



                          -----+ if levels does                           



                          not decrease | >0.5 ?                           



                          ? ? ? | Bacterial ? ?                           



                          ?| Strongly ? ? ?|                           



                          appropriately ? ? ? ?                           



                          ? ? ? | ? ? ? ? ? ? ?|                           



                          infection very |                           



                          encouraged ? ?| ? ? ?                           



                          ? ? ? ? ? ? ? ? ? ? ?                           



                          | ? ? ? ? ? ? ?|                           



                          likely ? ? ? ? | ? ? ?                           



                          ? ? ? ? | ? ? ? ? ? ?                           



                          ? ? ? ? ? ? ? ?                           



                          +--------------+------                           



                          ----------+-----------                           



                          ----+-----------------                           



                          -----------+                           



                          Discontinuation of                           



                          antibiotics in                           



                          high-acuity patients                           



                          with suspected or                           



                          confirmed sepsis in                           



                          Adults >= 18 years of                           



                          age.                                   



                          +--------------+------                           



                          ----------+-----------                           



                          ----+-----------------                           



                          -----------+|Procalcit                           



                          onin |Interpretation                           



                          ?|Antibiotic ? ?                           



                          |Considerations ? ? ?                           



                          ? ? ? ? |ng/mL ? ? ? ?                           



                          | ? ? ? ? ? ? ?                           



                          ?|recommendation | ? ?                           



                          ? ? ? ? ? ? ? ? ? ? ?                           



                          ?                                      



                          +--------------+------                           



                          ----------+-----------                           



                          ----+-----------------                           



                          -----------+| <0.25 ?                           



                          ? ? ?| Bacterial ? ?                           



                          ?| Strongly ? ? ?| ? ?                           



                          ? ? ? ? ? ? ? ? ? ? ?                           



                          ? | ? ? ? ? ? ? ?|                           



                          infection very |                           



                          discouraged ? |                           



                          Overruling: ? ? ? ? ?                           



                          ? ? ? | ? ? ? ? ? ? ?|                           



                          unlikely ? ? ? | ? ? ?                           



                          ? ? ? ? | ? Clinically                           



                          unstable ? ? ?                           



                          +--------------+------                           



                          ----------+-----------                           



                          ----+ ? High risk for                           



                          adverse ? ? | <0.5 or                           



                          drop | Bacterial ? ?                           



                          ?| Discouraged ? | ?                           



                          outcome ? ? ? ? ? ? ?                           



                          ? ? | >80% from ? ?|                           



                          infection ? ? ?| ? ? ?                           



                          ? ? ? ? | ? SEE                           



                          IMPORTANT NOTE ? ? ?                           



                          ?| highest PCT ?|                           



                          unlikely ? ? ? | ? ? ?                           



                          ? ? ? ? | ? ? ? ? ? ?                           



                          ? ? ? ? ? ? ? ? |                           



                          level ? ? ? ?| ? ? ? ?                           



                          ? ? ? ?| ? ? ? ? ? ? ?                           



                          | ? ? ? ? ? ? ? ? ? ?                           



                          ? ? ? ?                                



                          +--------------+------                           



                          ----------+-----------                           



                          ----+-----------------                           



                          -----------+| >=0.5 ?                           



                          ? ? ?| Bacterial ? ?                           



                          ?| Encouraged ? ?| ? ?                           



                          ? ? ? ? ? ? ? ? ? ? ?                           



                          ? | ? ? ? ? ? ? ?|                           



                          infection ? ? ?| ? ? ?                           



                          ? ? ? ? | ? ? ? ? ? ?                           



                          ? ? ? ? ? ? ? ? | ? ?                           



                          ? ? ? ? ?| likely ? ?                           



                          ? ? | ? ? ? ? ? ? ? |                           



                          Consider treatment                           



                          failure                                



                          ?+--------------+-----                           



                          -----------+----------                           



                          -----+ if levels does                           



                          not decrease | >1.0 ?                           



                          ? ? ? | Bacterial ? ?                           



                          ?| Strongly ? ? ?|                           



                          appropriately ? ? ? ?                           



                          ? ? ? | ? ? ? ? ? ? ?|                           



                          infection very |                           



                          encouraged ? ?| ? ? ?                           



                          ? ? ? ? ? ? ? ? ? ? ?                           



                          | ? ? ? ? ? ? ?|                           



                          likely ? ? ? ? | ? ? ?                           



                          ? ? ? ? | ? ? ? ? ? ?                           



                          ? ? ? ? ? ? ? ?                           



                          +--------------+------                           



                          ----------+-----------                           



                          ----+-----------------                           



                          -----------+                           



                          Percentage of drop of                           



                          Procalcitonin                           



                          calculation for                           



                          Discontinuation of                           



                          antibiotics in                           



                          high-acuity patients                           



                          with suspected or                           



                          confirmed sepsis in                           



                          Adults >= 18 years of                           



                          age. ? ? ? ? ? ? ? ? ?                           



                          ? ? Procalcitonin                           



                          highest{}-Procalcitoni                           



                          n current{}Delta                           



                          Procalcitonin =                           



                          ______________________                           



                          ______________________                           



                          ___ x100% ? ? ? ? ? ?                           



                          ? ? ? ? ? ? ? ? ? ?                           



                          Procalcitonin current                           



                          {} IMPORTANT NOTE:                           



                          Procalcitonin may be                           



                          elevated without                           



                          bacterial infection by                           



                          physiologic stress                           



                          related to trauma,                           



                          burns, chronic                           



                          dialysis, metastatic                           



                          cancer, surgery in the                           



                          past seven days,                           



                          malaria, some fungal                           



                          infections, and some                           



                          forms of vasculitis.                           



                          The interpretation                           



                          algorithm may not                           



                          apply to patients with                           



                          immunosuppression                           



                          (equivalent of >10 mg                           



                          of prednisone daily),                           



                          HIV with CD4 cell                           



                          count < 350 cells/mm3,                           



                          active malignancy on                           



                          systemic chemotherapy,                           



                          solid organ transplant                           



                          or hematopoietic stem                           



                          cell transplantation,                           



                          or hospital acquired                           



                          pneumonia.                             



                          Additionally, some                           



                          clinical trials of                           



                          procalcitonin have                           



                          excluded patients with                           



                          shock requiring                           



                          vasopressor use, acute                           



                          respiratory failure                           



                          requiring mechanical                           



                          ventilation, or those                           



                          with known lung                           



                          abscess/empyema. For                           



                          further information                           



                          please refer                           



                          to:[email or web address]                           



                          [email or web address]                           



                          p                                      

 

             Lab Interpretation Normal                                 



             (test code =                                        



             98542-6)                                            



[facility]





             Test Item    Value        Reference Range Interpretation Comments

 

             Procalcitonin (test 0.04 ng/mL   <0.07                     



             code = [id number])                                        

 

             MAL (test code = MAL) INTERPRETATION OF                           



                          PROCALCITONIN RESULTS IN                           



                          ADULTS >= 18 YEARS OF                           



                          AGE Initiation and                           



                          discontinuation of                           



                          antibiotics on patients                           



                          with suspected or                           



                          confirmed Lower                           



                          Respiratory Tract                           



                          Infection in Adults >=                           



                          18 years of age.                           



                          +--------------+--------                           



                          --------+---------------                           



                          +-----------------------                           



                          -----+|Procalcitonin                           



                          |Interpretation                           



                          ?|Antibiotic ? ?                           



                          |Considerations ? ? ? ?                           



                          ? ? ? |ng/mL ? ? ? ? | ?                           



                          ? ? ? ? ? ?                            



                          ?|recommendation | ? ? ?                           



                          ? ? ? ? ? ? ? ? ? ? ?                           



                          +--------------+--------                           



                          --------+---------------                           



                          +-----------------------                           



                          -----+| <0.1 ? ? ? ? |                           



                          Bacterial ? ? ?|                           



                          Strongly ? ? ?| ? ? ? ?                           



                          ? ? ? ? ? ? ? ? ? ? | ?                           



                          ? ? ? ? ? ?| infection                           



                          very | discouraged ? |                           



                          Overruling: ? ? ? ? ? ?                           



                          ? ? | ? ? ? ? ? ? ?|                           



                          unlikely ? ? ? | ? ? ? ?                           



                          ? ? ? | ? Clinically                           



                          unstable ? ? ?                           



                          +--------------+--------                           



                          --------+---------------                           



                          + ? High risk for                           



                          adverse ? ? | <0.25 ? ?                           



                          ? ?| Bacterial ? ? ?|                           



                          Discouraged ? | ?                           



                          outcome ? ? ? ? ? ? ? ?                           



                          ? | ? ? ? ? ? ? ?|                           



                          infection ? ? ?| ? ? ? ?                           



                          ? ? ? | ? SEE IMPORTANT                           



                          NOTE ? ? ? ?| ? ? ? ? ?                           



                          ? ?| unlikely ? ? ? | ?                           



                          ? ? ? ? ? ? | ? ? ? ? ?                           



                          ? ? ? ? ? ? ? ? ?                           



                          +--------------+--------                           



                          --------+---------------                           



                          +-----------------------                           



                          -----+| >=0.25 ? ? ? |                           



                          Bacterial ? ? ?|                           



                          Encouraged ? ?| ? ? ? ?                           



                          ? ? ? ? ? ? ? ? ? ? | ?                           



                          ? ? ? ? ? ?| infection ?                           



                          ? ?| ? ? ? ? ? ? ? | ? ?                           



                          ? ? ? ? ? ? ? ? ? ? ? ?                           



                          | ? ? ? ? ? ? ?| likely                           



                          ? ? ? ? | ? ? ? ? ? ? ?                           



                          | Consider treatment                           



                          failure                                



                          ?+--------------+-------                           



                          ---------+--------------                           



                          -+ if levels does not                           



                          decrease | >0.5 ? ? ? ?                           



                          | Bacterial ? ? ?|                           



                          Strongly ? ? ?|                           



                          appropriately ? ? ? ? ?                           



                          ? ? | ? ? ? ? ? ? ?|                           



                          infection very |                           



                          encouraged ? ?| ? ? ? ?                           



                          ? ? ? ? ? ? ? ? ? ? | ?                           



                          ? ? ? ? ? ?| likely ? ?                           



                          ? ? | ? ? ? ? ? ? ? | ?                           



                          ? ? ? ? ? ? ? ? ? ? ? ?                           



                          ?                                      



                          +--------------+--------                           



                          --------+---------------                           



                          +-----------------------                           



                          -----+ Discontinuation                           



                          of antibiotics in                           



                          high-acuity patients                           



                          with suspected or                           



                          confirmed sepsis in                           



                          Adults >= 18 years of                           



                          age.                                   



                          +--------------+--------                           



                          --------+---------------                           



                          +-----------------------                           



                          -----+|Procalcitonin                           



                          |Interpretation                           



                          ?|Antibiotic ? ?                           



                          |Considerations ? ? ? ?                           



                          ? ? ? |ng/mL ? ? ? ? | ?                           



                          ? ? ? ? ? ?                            



                          ?|recommendation | ? ? ?                           



                          ? ? ? ? ? ? ? ? ? ? ?                           



                          +--------------+--------                           



                          --------+---------------                           



                          +-----------------------                           



                          -----+| <0.25 ? ? ? ?|                           



                          Bacterial ? ? ?|                           



                          Strongly ? ? ?| ? ? ? ?                           



                          ? ? ? ? ? ? ? ? ? ? | ?                           



                          ? ? ? ? ? ?| infection                           



                          very | discouraged ? |                           



                          Overruling: ? ? ? ? ? ?                           



                          ? ? | ? ? ? ? ? ? ?|                           



                          unlikely ? ? ? | ? ? ? ?                           



                          ? ? ? | ? Clinically                           



                          unstable ? ? ?                           



                          +--------------+--------                           



                          --------+---------------                           



                          + ? High risk for                           



                          adverse ? ? | <0.5 or                           



                          drop | Bacterial ? ? ?|                           



                          Discouraged ? | ?                           



                          outcome ? ? ? ? ? ? ? ?                           



                          ? | >80% from ? ?|                           



                          infection ? ? ?| ? ? ? ?                           



                          ? ? ? | ? SEE IMPORTANT                           



                          NOTE ? ? ? ?| highest                           



                          PCT ?| unlikely ? ? ? |                           



                          ? ? ? ? ? ? ? | ? ? ? ?                           



                          ? ? ? ? ? ? ? ? ? ? |                           



                          level ? ? ? ?| ? ? ? ? ?                           



                          ? ? ?| ? ? ? ? ? ? ? | ?                           



                          ? ? ? ? ? ? ? ? ? ? ? ?                           



                          ?                                      



                          +--------------+--------                           



                          --------+---------------                           



                          +-----------------------                           



                          -----+| >=0.5 ? ? ? ?|                           



                          Bacterial ? ? ?|                           



                          Encouraged ? ?| ? ? ? ?                           



                          ? ? ? ? ? ? ? ? ? ? | ?                           



                          ? ? ? ? ? ?| infection ?                           



                          ? ?| ? ? ? ? ? ? ? | ? ?                           



                          ? ? ? ? ? ? ? ? ? ? ? ?                           



                          | ? ? ? ? ? ? ?| likely                           



                          ? ? ? ? | ? ? ? ? ? ? ?                           



                          | Consider treatment                           



                          failure                                



                          ?+--------------+-------                           



                          ---------+--------------                           



                          -+ if levels does not                           



                          decrease | >1.0 ? ? ? ?                           



                          | Bacterial ? ? ?|                           



                          Strongly ? ? ?|                           



                          appropriately ? ? ? ? ?                           



                          ? ? | ? ? ? ? ? ? ?|                           



                          infection very |                           



                          encouraged ? ?| ? ? ? ?                           



                          ? ? ? ? ? ? ? ? ? ? | ?                           



                          ? ? ? ? ? ?| likely ? ?                           



                          ? ? | ? ? ? ? ? ? ? | ?                           



                          ? ? ? ? ? ? ? ? ? ? ? ?                           



                          ?                                      



                          +--------------+--------                           



                          --------+---------------                           



                          +-----------------------                           



                          -----+ Percentage of                           



                          drop of Procalcitonin                           



                          calculation for                           



                          Discontinuation of                           



                          antibiotics in                           



                          high-acuity patients                           



                          with suspected or                           



                          confirmed sepsis in                           



                          Adults >= 18 years of                           



                          age. ? ? ? ? ? ? ? ? ? ?                           



                          ? Procalcitonin                           



                          highest{}-Procalcitonin                           



                          current{}Delta                           



                          Procalcitonin =                           



                          ________________________                           



                          _______________________                           



                          x100% ? ? ? ? ? ? ? ? ?                           



                          ? ? ? ? ? ? ?                           



                          Procalcitonin current {}                           



                          IMPORTANT NOTE:                           



                          Procalcitonin may be                           



                          elevated without                           



                          bacterial infection by                           



                          physiologic stress                           



                          related to trauma,                           



                          burns, chronic dialysis,                           



                          metastatic cancer,                           



                          surgery in the past                           



                          seven days, malaria,                           



                          some fungal infections,                           



                          and some forms of                           



                          vasculitis. The                           



                          interpretation algorithm                           



                          may not apply to                           



                          patients with                           



                          immunosuppression                           



                          (equivalent of >10 mg of                           



                          prednisone daily), HIV                           



                          with CD4 cell count <                           



                          350 cells/mm3, active                           



                          malignancy on systemic                           



                          chemotherapy, solid                           



                          organ transplant or                           



                          hematopoietic stem cell                           



                          transplantation, or                           



                          hospital acquired                           



                          pneumonia. Additionally,                           



                          some clinical trials of                           



                          procalcitonin have                           



                          excluded patients with                           



                          shock requiring                           



                          vasopressor use, acute                           



                          respiratory failure                           



                          requiring mechanical                           



                          ventilation, or those                           



                          with known lung                           



                          abscess/empyema. For                           



                          further information                           



                          please refer                           



                          to:[email or web address]                           



                          [email or web address]                           

 

             Lab Interpretation Normal                                 



             (test code = 33668-8)                                        



[id number]10





             Test Item    Value        Reference Range Interpretation Comments

 

             ESR (test code =              See_Comment  H             [Automated

 message]



             [id number])                                         The system [employer]



                                                                 generated this 

result



                                                                 transmitted ref

erence



                                                                 range: 0 - 10 m

m/HR.



                                                                 The reference r

zhen



                                                                 was not used to



                                                                 interpret this 

result



                                                                 as normal/abnor

mal.

 

             Lab Interpretation (test Abnormal                               



             code = 21454-4)                                        



[facility] 
13:12:27





             Test Item    Value        Reference Range Interpretation Comments

 

             HGB A1C (test code = 6.3 %        4.0-5.7      H            



             4548-4)                                             

 

             MAL (test code = MAL) Reference                              



                          RangesNormal:                           



                          <5.7%Prediabetes:                           



                          5.7 - 6.4%Diabetes:                           



                          > 6.5%                                 

 

             Lab Interpretation (test Abnormal                               



             code = 48607-3)                                        



[id number] 11:53:14





             Test Item    Value        Reference Range Interpretation Comments

 

             NT-proBNP (test code 16 pg/mL     See_Comment                [Autom

ated



             = [id number])                                        message] The



                                                                 system which



                                                                 generated this



                                                                 result



                                                                 transmitted



                                                                 reference range

:



                                                                 <=125. The



                                                                 reference range



                                                                 was not used to



                                                                 interpret this



                                                                 result as



                                                                 normal/abnormal

.

 

             MAL (test code = MAL) Biotin has been                           



                          reported to                            



                          cause a negative                           



                          bias, interpret                           



                          results relative                           



                          to patient's use                           



                          of biotin.                             

 

             Lab Interpretation Normal                                 



             (test code = 35208-9)                                        



[id number] 
[facility]44:55





             Test Item    Value        Reference Range Interpretation Comments

 

             NA (test code = 142 mmol/L   135-145                   



             7713618533)                                         

 

             K (test code = 3.9 mmol/L   3.5-5.0                   



             3294490103)                                         

 

             CL (test code = 108 mmol/L                       



             1126682901)                                         

 

             CO2 TOTAL (test code = 24 mmol/L    23-31                     



             8844518117)                                         

 

             AGAP (test code =              2-16                      



             5283955031)                                         

 

             BUN (test code = 11 mg/dL     7-23                      



             0027720098)                                         

 

             GLUCOSE (test code = 205 mg/dL           H            



             2827712218)                                         

 

             CREATININE (test code = 0.71 mg/dL   0.60-1.25                 



             1681050275)                                         

 

             TOTAL BILI (test code = 0.7 mg/dL    0.1-1.1                   



             7646369314)                                         

 

             CALCIUM (test code = 8.9 mg/dL    8.6-10.6                  



             0379262216)                                         

 

             T PROTEIN (test code = 7.3 g/dL     6.3-8.2                   



             0350480118)                                         

 

             ALBUMIN (test code = 3.8 g/dL     3.5-5.0                   



             8798517111)                                         

 

             ALK PHOS (test code = 135 U/L             H            



             2799946460)                                         

 

             ALTv (test code = 120 U/L      5-50         H            



             1742-6)                                             

 

             AST(SGOT) (test code = 33 U/L       13-40                     



             6703994299)                                         

 

             eGFR (test code =              mL/min/1.73m2              



             8166140282)                                         

 

             MAL (test code = MAL) Association of                           



                          Glomerular Filtration                           



                          Rate (GFR) and Staging                           



                          of Kidney Disease*                           



                          +---------------------                           



                          --+-------------------                           



                          --+-------------------                           



                          ------+| GFR                           



                          (mL/min/1.73 m2) ?|                           



                          With Kidney Damage ?|                           



                          ?Without Kidney                           



                          Damage+---------------                           



                          --------+-------------                           



                          --------+-------------                           



                          ------------+| ?>90 ?                           



                          ? ? ? ? ? ? ? ?|                           



                          ?Stage one ? ? ? ? ?|                           



                          ? Normal ? ? ? ? ? ? ?                           



                          ?+--------------------                           



                          ---+------------------                           



                          ---+------------------                           



                          -------+| ?60-89 ? ? ?                           



                          ? ? ? ? ?| ?Stage two                           



                          ? ? ? ? ?| ? Decreased                           



                          GFR ? ? ? ?                            



                          +---------------------                           



                          --+-------------------                           



                          --+-------------------                           



                          ------+| ?30-59 ? ? ?                           



                          ? ? ? ? ?| ?Stage                           



                          three ? ? ? ?| ? Stage                           



                          three ? ? ? ? ?                           



                          +---------------------                           



                          --+-------------------                           



                          --+-------------------                           



                          ------+| ?15-29 ? ? ?                           



                          ? ? ? ? ?| ?Stage four                           



                          ? ? ? ? | ? Stage four                           



                          ? ? ? ? ?                              



                          ?+--------------------                           



                          ---+------------------                           



                          ---+------------------                           



                          -------+| ?<15 (or                           



                          dialysis) ? ?| ?Stage                           



                          five ? ? ? ? | ? Stage                           



                          five ? ? ? ? ?                           



                          ?+--------------------                           



                          ---+------------------                           



                          ---+------------------                           



                          -------+ *Each stage                           



                          assumes the associated                           



                          GFR level has been in                           



                          effect for at least                           



                          three months. ?Stages                           



                          1 to 5, with or                           



                          without kidney                           



                          disease, indicate                           



                          chronic kidney                           



                          disease. Notes:                           



                          Determination of                           



                          stages one and two                           



                          (with eGFR                             



                          >59mL/min/1.73 m2)                           



                          requires estimation of                           



                          kidney damage for at                           



                          least three months as                           



                          defined by structural                           



                          or functional                           



                          abnormalities of the                           



                          kidney, manifested by                           



                          either:Pathological                           



                          abnormalities or                           



                          Markers of kidney                           



                          damage (including                           



                          abnormalities in the                           



                          composition of the                           



                          blood or urine or                           



                          abnormalities in                           



                          imaging tests).                           

 

             Lab Interpretation Abnormal                               



             (test code = 69704-5)                                        



[facility]





             Test Item    Value        Reference Range Interpretation Comments

 

             MAGNESIUM (test code = 9372746003) 1.6 mg/dL    1.7-2.4      L     

       

 

             Lab Interpretation (test code = Abnormal                           

    



             02480-2)                                            



[facility] 11[facility]35





             Test Item    Value        Reference Range Interpretation Comments

 

             PHOSPHORUS (test code = 8200197784) 4.1 mg/dL    2.5-5.0           

        

 

             Lab Interpretation (test code = Normal                             

    



             72194-3)                                            



[id number] 11:44:15





             Test Item    Value        Reference Range Interpretation Comments

 

             CK (test code = 0676605917) 50 U/L                           

 

             Lab Interpretation (test code = Normal                             

    



             24701-1)                                            



[facility] [id number] 11:44:15





             Test Item    Value        Reference Range Interpretation Comments

 

             CK (test code = 1927408253) 50 U/L                           

 

             Lab Interpretation (test code = Normal                             

    



             60299-0)                                            



[facility] [id number] 11:28:14





             Test Item    Value        Reference Range Interpretation Comments

 

             WBC (test code =              See_Comment                [Automated



             7474-2)                                             message] The sy

stem



                                                                 which generated



                                                                 this result



                                                                 transmitted



                                                                 reference range

:



                                                                 4.20 - 10.70



                                                                 10*3/?L. The



                                                                 reference range

 was



                                                                 not used to



                                                                 interpret this



                                                                 result as



                                                                 normal/abnormal

.

 

             RBC (test code =              See_Comment                [Automated



             621-8)                                              message] The sy

stem



                                                                 which generated



                                                                 this result



                                                                 transmitted



                                                                 reference range

:



                                                                 4.26 - 5.52



                                                                 10*6/?L. The



                                                                 reference range

 was



                                                                 not used to



                                                                 interpret this



                                                                 result as



                                                                 normal/abnormal

.

 

             HGB (test code = 15.0 g/dL    12.2-16.4                 



             718-7)                                              

 

             HCT (test code = 44.6 %       38.4-49.3                 



             4544-3)                                             

 

             MCV (test code = 87.6 fL      81.7-95.6                 



             787-2)                                              

 

             MCH (test code = 29.5 pg      26.1-32.7                 



             785-6)                                              

 

             MCHC (test code = 33.6 g/dL    31.2-35.0                 



             786-4)                                              

 

             RDW-SD (test code = 48.4 fL      38.5-51.6                 



             01104-9)                                            

 

             RDW-CV (test code = 15.1 %       12.1-15.4                 



             788-0)                                              

 

             PLT (test code =              See_Comment                [Automated



             777-3)                                              message] The sy

stem



                                                                 which generated



                                                                 this result



                                                                 transmitted



                                                                 reference range

:



                                                                 150 - 328 10*3/

?L.



                                                                 The reference r

zhen



                                                                 was not used to



                                                                 interpret this



                                                                 result as



                                                                 normal/abnormal

.

 

             MPV (test code = 10.2 fL      9.8-13.0                  



             50319-9)                                            

 

             NRBC/100 WBC (test              See_Comment                [Automat

ed



             code = 3318862644)                                        message] 

The system



                                                                 which generated



                                                                 this result



                                                                 transmitted



                                                                 reference range

:



                                                                 0.0 - 10.0 /100



                                                                 WBCs. The refer

ence



                                                                 range was not u

sed



                                                                 to interpret th

is



                                                                 result as



                                                                 normal/abnormal

.

 

             NRBC x10^3 (test code <0.01        See_Comment                [Auto

mated



             = 1136882587)                                        message] The s

ystem



                                                                 which generated



                                                                 this result



                                                                 transmitted



                                                                 reference range

:



                                                                 10*3/?L. The



                                                                 reference range

 was



                                                                 not used to



                                                                 interpret this



                                                                 result as



                                                                 normal/abnormal

.

 

             GRAN MAT (NEUT) % 67.8 %                                 



             (test code = 770-8)                                        

 

             IMM GRAN % (test code 0.80 %                                 



             = 0027823650)                                        

 

             LYMPH % (test code = 19.8 %                                 



             736-9)                                              

 

             MONO % (test code = 8.7 %                                  



             5905-5)                                             

 

             EOS % (test code = 2.7 %                                  



             713-8)                                              

 

             BASO % (test code = 0.2 %                                  



             706-2)                                              

 

             GRAN MAT x10^3(ANC) 6.56 10*3/uL 1.99-6.95                 



             (test code =                                        



             8355016754)                                         

 

             IMM GRAN x10^3 (test 0.08 10*3/uL 0.00-0.06    H            



             code = 4772177559)                                        

 

             LYMPH x10^3 (test code 1.91 10*3/uL 1.09-3.23                 



             = 731-0)                                            

 

             MONO x10^3 (test code 0.84 10*3/uL 0.36-1.02                 



             = 742-7)                                            

 

             EOS x10^3 (test code = 0.26 10*3/uL 0.06-0.53                 



             711-2)                                              

 

             BASO x10^3 (test code <0.03        0.01-0.09                 



             = 704-7)                                            

 

             Lab Interpretation Abnormal                               



             (test code = 99043-2)                                        



[facility] [id number] 10:56:10





             Test Item    Value        Reference Range Interpretation Comments

 

             IRON (test code = 5316024979) 46 ug/dL            L          

  

 

             TIBC (test code = 9540902700) 299 ug/dL    250-410                 

  

 

             % FE SAT (test code = 0343135097) 15 %         20-50        L      

      

 

             Lab Interpretation (test code = Abnormal                           

    



             69508-5)                                            



[facility] [id number] 10:56:10





             Test Item    Value        Reference Range Interpretation Comments

 

             IRON (test code = 1386624550) 46 ug/dL            L          

  

 

             TIBC (test code = 1367324433) 299 ug/dL    250-410                 

  

 

             % FE SAT (test code = 3275666213) 15 %         20-50        L      

      

 

             Lab Interpretation (test code = Abnormal                           

    



             45726-1)                                            



[facility] [id number] 10:13:03





             Test Item    Value        Reference Range Interpretation Comments

 

             FERRITIN (test code = 89.2 ng/mL                       



             3316094859)                                         

 

             MAL (test code = MAL) Biotin has been                           



                          reported to cause a                           



                          negative bias,                           



                          interpret results                           



                          relative to                            



                          patient's use of                           



                          biotin.                                

 

             Lab Interpretation (test Normal                                 



             code = 16326-2)                                        



[facility] [id number] 10:13:03





             Test Item    Value        Reference Range Interpretation Comments

 

             FERRITIN (test code = 89.2 ng/mL   18.0-464.0                



             2339895098)                                         

 

             MAL (test code = MAL) Biotin has been                           



                          reported to cause a                           



                          negative bias,                           



                          interpret results                           



                          relative to                            



                          patient's use of                           



                          biotin.                                

 

             Lab Interpretation (test Normal                                 



             code = 96680-0)                                        



[facility] [id number] 10:09:06





             Test Item    Value        Reference Range Interpretation Comments

 

             TSH (test code =              See_Comment                [Automated

 message]



             4350565488)                                         The system [employer]



                                                                 generated this 

result



                                                                 transmitted ref

erence



                                                                 range: 0.45 - 4

.70



                                                                 mIU/L. The refe

rence



                                                                 range was not u

sed to



                                                                 interpret this 

result



                                                                 as normal/abnor

mal.

 

             Lab Interpretation (test Normal                                 



             code = 95603-8)                                        



[facility] [id number] 10:09:06





             Test Item    Value        Reference Range Interpretation Comments

 

             TSH (test code =              See_Comment                [Automated

 message]



             9630402141)                                         The system [employer]



                                                                 generated this 

result



                                                                 transmitted ref

erence



                                                                 range: 0.45 - 4

.70



                                                                 mIU/L. The refe

rence



                                                                 range was not u

sed to



                                                                 interpret this 

result



                                                                 as normal/abnor

mal.

 

             Lab Interpretation (test Normal                                 



             code = 82191-5)                                        



[id number]44





             Test Item    Value        Reference Range Interpretation Comments

 

             NT-proBNP (test code 12 pg/mL     See_Comment                [Autom

ated



             = [id number])                                        message] The



                                                                 system which



                                                                 generated this



                                                                 result



                                                                 transmitted



                                                                 reference range

:



                                                                 <=125. The



                                                                 reference range



                                                                 was not used to



                                                                 interpret this



                                                                 result as



                                                                 normal/abnormal

.

 

             MAL (test code = MAL) Biotin has been                           



                          reported to                            



                          cause a negative                           



                          bias, interpret                           



                          results relative                           



                          to patient's use                           



                          of biotin.                             

 

             Lab Interpretation Normal                                 



             (test code = 40926-2)                                        



[id number]TOTAL CHOLESTEROL, 
TRIGLYCERIDES, HDL)2022 09:35:58





             Test Item    Value        Reference Range Interpretation Comments

 

             CHOL (test code = 250 mg/dL    120-200      H            



             [id number])                                         

 

             HDL (test code = 34 mg/dL     See_Comment  L             [Automated

 message]



             0536062501)                                         The system [employer]



                                                                 generated this



                                                                 result transmit

huma



                                                                 reference range

:



                                                                 >=40. The refer

ence



                                                                 range was not u

sed



                                                                 to interpret th

is



                                                                 result as



                                                                 normal/abnormal

.

 

             HDLC RATIO (test code =              See_Comment  H             [Au

tomated message]



             [id number])                                         The system [employer]



                                                                 generated this



                                                                 result transmit

huma



                                                                 reference range

:



                                                                 <=5.0. The refe

rence



                                                                 range was not u

sed



                                                                 to interpret th

is



                                                                 result as



                                                                 normal/abnormal

.

 

             TRIG (test code = 394 mg/dL           H            



             0615724876)                                         

 

             LDL CHOL (test code = 137 mg/dL    See_Comment                [Auto

mated message]



             65233-6)                                            The system [employer]



                                                                 generated this



                                                                 result transmit

huma



                                                                 reference range

:



                                                                 <=160. The refe

rence



                                                                 range was not u

sed



                                                                 to interpret th

is



                                                                 result as



                                                                 normal/abnormal

.

 

             VLDL (test code = 79 mg/dL     5-60         H            



             9535356362)                                         

 

             Lab Interpretation (test Abnormal                               



             code = 66565-2)                                        



[id number]TOTAL CHOLESTEROL, 
TRIGLYCERIDES, HDL)2022 09:35:58





             Test Item    Value        Reference Range Interpretation Comments

 

             CHOL (test code = 250 mg/dL    120-200      H            



             2899732635)                                         

 

             HDL (test code = 34 mg/dL     >40          L            



             5416235227)                                         

 

             HDLC RATIO (test code =              See_Comment  H             [Au

tomated message]



             3981601217)                                         The system [employer]



                                                                 generated this



                                                                 result transmit

huma



                                                                 reference range

:



                                                                 <=5.0. The refe

rence



                                                                 range was not u

sed



                                                                 to interpret th

is



                                                                 result as



                                                                 normal/abnormal

.

 

             TRIG (test code = 394 mg/dL           H            



             3466734490)                                         

 

             LDL CHOL (test code = 137 mg/dL    See_Comment                [Auto

mated message]



             89671-9)                                            The system [employer]



                                                                 generated this



                                                                 result transmit

huma



                                                                 reference range

:



                                                                 <=160. The refe

rence



                                                                 range was not u

sed



                                                                 to interpret th

is



                                                                 result as



                                                                 normal/abnormal

.

 

             VLDL (test code = 79 mg/dL     5-60         H            



             0960015393)                                         

 

             Lab Interpretation (test Abnormal                               



             code = 27253-1)                                        



[facility]





             Test Item    Value        Reference Range Interpretation Comments

 

             MAGNESIUM (test code = 9629883808) 1.5 mg/dL    1.7-2.4      L     

       

 

             Lab Interpretation (test code = Abnormal                           

    



             87552-1)                                            



[facility] 09:[facility]42





             Test Item    Value        Reference Range Interpretation Comments

 

             PHOSPHORUS (test code = 0879606445) 3.3 mg/dL    2.5-5.0           

        

 

             Lab Interpretation (test code = Normal                             

    



             98570-6)                                            



[facility]. METABOLIC PANEL ([id number])2022 
01:29:09





             Test Item    Value        Reference Range Interpretation Comments

 

             NA (test code = 142 mmol/L   135-145                   



             6326830813)                                         

 

             K (test code = 3.7 mmol/L   3.5-5.0                   



             5064416803)                                         

 

             CL (test code = 103 mmol/L                       



             1201333586)                                         

 

             CO2 TOTAL (test code = 27 mmol/L    23-31                     



             1665744032)                                         

 

             AGAP (test code =              2-16                      



             5702476402)                                         

 

             BUN (test code = 11 mg/dL     7-23                      



             4612457202)                                         

 

             GLUCOSE (test code = 166 mg/dL           H            



             0723470398)                                         

 

             CREATININE (test code = 0.61 mg/dL   0.60-1.25                 



             8536136503)                                         

 

             TOTAL BILI (test code = 0.8 mg/dL    0.1-1.1                   



             2671529769)                                         

 

             CALCIUM (test code = 9.3 mg/dL    8.6-10.6                  



             2392321351)                                         

 

             T PROTEIN (test code = 7.4 g/dL     6.3-8.2                   



             8137606376)                                         

 

             ALBUMIN (test code = 4.0 g/dL     3.5-5.0                   



             1835740999)                                         

 

             ALK PHOS (test code = 164 U/L             H            



             0464598372)                                         

 

             ALTv (test code = 149 U/L      5-50         H            



             1742-6)                                             

 

             AST(SGOT) (test code = 29 U/L       13-40                     



             0340310964)                                         

 

             eGFR (test code =              mL/min/1.73m2              



             8033169110)                                         

 

             MAL (test code = MAL) Association of                           



                          Glomerular Filtration                           



                          Rate (GFR) and Staging                           



                          of Kidney Disease*                           



                          +---------------------                           



                          --+-------------------                           



                          --+-------------------                           



                          ------+| GFR                           



                          (mL/min/1.73 m2) ?|                           



                          With Kidney Damage ?|                           



                          ?Without Kidney                           



                          Damage+---------------                           



                          --------+-------------                           



                          --------+-------------                           



                          ------------+| ?>90 ?                           



                          ? ? ? ? ? ? ? ?|                           



                          ?Stage one ? ? ? ? ?|                           



                          ? Normal ? ? ? ? ? ? ?                           



                          ?+--------------------                           



                          ---+------------------                           



                          ---+------------------                           



                          -------+| ?60-89 ? ? ?                           



                          ? ? ? ? ?| ?Stage two                           



                          ? ? ? ? ?| ? Decreased                           



                          GFR ? ? ? ?                            



                          +---------------------                           



                          --+-------------------                           



                          --+-------------------                           



                          ------+| ?30-59 ? ? ?                           



                          ? ? ? ? ?| ?Stage                           



                          three ? ? ? ?| ? Stage                           



                          three ? ? ? ? ?                           



                          +---------------------                           



                          --+-------------------                           



                          --+-------------------                           



                          ------+| ?15-29 ? ? ?                           



                          ? ? ? ? ?| ?Stage four                           



                          ? ? ? ? | ? Stage four                           



                          ? ? ? ? ?                              



                          ?+--------------------                           



                          ---+------------------                           



                          ---+------------------                           



                          -------+| ?<15 (or                           



                          dialysis) ? ?| ?Stage                           



                          five ? ? ? ? | ? Stage                           



                          five ? ? ? ? ?                           



                          ?+--------------------                           



                          ---+------------------                           



                          ---+------------------                           



                          -------+ *Each stage                           



                          assumes the associated                           



                          GFR level has been in                           



                          effect for at least                           



                          three months. ?Stages                           



                          1 to 5, with or                           



                          without kidney                           



                          disease, indicate                           



                          chronic kidney                           



                          disease. Notes:                           



                          Determination of                           



                          stages one and two                           



                          (with eGFR                             



                          >59mL/min/1.73 m2)                           



                          requires estimation of                           



                          kidney damage for at                           



                          least three months as                           



                          defined by structural                           



                          or functional                           



                          abnormalities of the                           



                          kidney, manifested by                           



                          either:Pathological                           



                          abnormalities or                           



                          Markers of kidney                           



                          damage (including                           



                          abnormalities in the                           



                          composition of the                           



                          blood or urine or                           



                          abnormalities in                           



                          imaging tests).                           

 

             Lab Interpretation Abnormal                               



             (test code = 64208-3)                                        



[facility] [id number] 
01:23:46





             Test Item    Value        Reference Range Interpretation Comments

 

             APTT Patient (test              See_Comment                [Automat

ed



             code = 3173-2)                                        message] The



                                                                 system which



                                                                 generated this



                                                                 result



                                                                 transmitted



                                                                 reference range

:



                                                                 23 - 38 Seconds

.



                                                                 The reference



                                                                 range was not



                                                                 used to interpr

et



                                                                 this result as



                                                                 normal/abnormal

.

 

             MAL (test code = MAL) The [facility] patient                           



                          population mean                           



                          normal value for                           



                          aPTT is 30                             



                          seconds.                               

 

             Lab Interpretation Normal                                 



             (test code = 48950-8)                                        



[facility] [id number] 01:21:51





             Test Item    Value        Reference Range Interpretation Comments

 

             PROTIME PATIENT (test              See_Comment                [Auto

mated message]



             code = 5964-2)                                        The system wh

ich



                                                                 generated this 

result



                                                                 transmitted ref

erence



                                                                 range: 12.0 - 1

4.7



                                                                 Seconds. The re

ference



                                                                 range was not u

sed to



                                                                 interpret this 

result



                                                                 as normal/abnor

mal.

 

             INR (test code = 6301-6)                                        Nor

mal INR <1.1;



                                                                 Warfarin Therap

eutic



                                                                 range 2.0 to 3.

0 or



                                                                 2.5 to 3.5, dep

ending



                                                                 upon the indica

tions.

 

             Lab Interpretation (test Normal                                 



             code = 43546-1)                                        



[id number] 01:15:28





             Test Item    Value        Reference Range Interpretation Comments

 

             WBC (test code =              See_Comment  H             [Automated



             6690-2)                                             message] The sy

stem



                                                                 which generated



                                                                 this result



                                                                 transmitted



                                                                 reference range

:



                                                                 4.20 - 10.70



                                                                 10*3/?L. The



                                                                 reference range

 was



                                                                 not used to



                                                                 interpret this



                                                                 result as



                                                                 normal/abnormal

.

 

             RBC (test code =              See_Comment  H             [Automated



             789-8)                                              message] The sy

stem



                                                                 which generated



                                                                 this result



                                                                 transmitted



                                                                 reference range

:



                                                                 4.26 - 5.52



                                                                 10*6/?L. The



                                                                 reference range

 was



                                                                 not used to



                                                                 interpret this



                                                                 result as



                                                                 normal/abnormal

.

 

             HGB (test code = 16.5 g/dL    12.2-16.4    H            



             718-7)                                              

 

             HCT (test code = 49.1 %       38.4-49.3                 



             4544-3)                                             

 

             MCV (test code = 87.7 fL      81.7-95.6                 



             787-2)                                              

 

             MCH (test code = 29.5 pg      26.1-32.7                 



             785-6)                                              

 

             MCHC (test code = 33.6 g/dL    31.2-35.0                 



             786-4)                                              

 

             RDW-SD (test code = 48.4 fL      38.5-51.6                 



             08384-3)                                            

 

             RDW-CV (test code = 15.1 %       12.1-15.4                 



             788-0)                                              

 

             PLT (test code =              See_Comment                [Automated



             777-3)                                              message] The sy

stem



                                                                 which generated



                                                                 this result



                                                                 transmitted



                                                                 reference range

:



                                                                 150 - 328 10*3/

?L.



                                                                 The reference r

zhen



                                                                 was not used to



                                                                 interpret this



                                                                 result as



                                                                 normal/abnormal

.

 

             MPV (test code = 10.1 fL      9.8-13.0                  



             01330-2)                                            

 

             NRBC/100 WBC (test              See_Comment                [Automat

ed



             code = [id number])                                        message] 

The system



                                                                 which generated



                                                                 this result



                                                                 transmitted



                                                                 reference range

:



                                                                 0.0 - 10.0 /100



                                                                 WBCs. The refer

ence



                                                                 range was not u

sed



                                                                 to interpret th

is



                                                                 result as



                                                                 normal/abnormal

.

 

             NRBC x10^3 (test code <0.01        See_Comment                [Auto

mated



             = [id number])                                        message] The s

ystem



                                                                 which generated



                                                                 this result



                                                                 transmitted



                                                                 reference range

:



                                                                 10*3/?L. The



                                                                 reference range

 was



                                                                 not used to



                                                                 interpret this



                                                                 result as



                                                                 normal/abnormal

.

 

             GRAN MAT (NEUT) % 72.7 %                                 



             (test code = 770-8)                                        

 

             IMM GRAN % (test code 0.60 %                                 



             = [id number])                                        

 

             LYMPH % (test code = 15.5 %                                 



             736-9)                                              

 

             MONO % (test code = 8.2 %                                  



             5905-5)                                             

 

             EOS % (test code = 2.6 %                                  



             713-8)                                              

 

             BASO % (test code = 0.4 %                                  



             706-2)                                              

 

             GRAN MAT x10^3(ANC) 7.83 10*3/uL 1.99-6.95    H            



             (test code =                                        



             [id number])                                         

 

             IMM GRAN x10^3 (test 0.07 10*3/uL 0.00-0.06    H            



             code = [id number])                                        

 

             LYMPH x10^3 (test code 1.67 10*3/uL 1.09-3.23                 



             = 731-0)                                            

 

             MONO x10^3 (test code 0.88 10*3/uL 0.36-1.02                 



             = 742-7)                                            

 

             EOS x10^3 (test code = 0.28 10*3/uL 0.06-0.53                 



             711-2)                                              

 

             BASO x10^3 (test code 0.04 10*3/uL 0.01-0.09                 



             = 704-7)                                            

 

             Lab Interpretation Abnormal                               



             (test code = 43704-0)                                        



[facility]32





             Test Item    Value        Reference Range Interpretation Comments

 

             LACTIC ACID (test code = 1.86 mmol/L  0.50-2.20                 



             [id number])                                         

 

             Lab Interpretation (test code = Normal                             

    



             23892-5)                                            



[facility] PPAYP8143-22-27 12:58:00





             Test Item    Value        Reference Range Interpretation Comments

 

             Glucose Lvl (test code = Glucose Lvl) 228          70-99           

          



[facility]2022-04-12 12:58:00





             Test Item    Value        Reference Range Interpretation Comments

 

             BUN (test code = BUN) 23           7-22                      



[facility]2022-04-12 12:58:00





             Test Item    Value        Reference Range Interpretation Comments

 

             Creatinine Lvl (test code = Creatinine 0.78         0.50-1.40      

           



             Lvl)                                                



[facility]2022-04-12 12:58:00





             Test Item    Value        Reference Range Interpretation Comments

 

             Sodium Lvl (test code = Sodium Lvl) 138          135-145           

        



[facility]2022-04-12 12:58:00





             Test Item    Value        Reference Range Interpretation Comments

 

             Potassium Lvl (test code = Potassium 4.6          3.5-5.1          

         



             Lvl)                                                



[place] 422952-04-12 12:58:00





             Test Item    Value        Reference Range Interpretation Comments

 

             Chloride Lvl (test code = Chloride Lvl) 108                  

            



[place] 422952-04-12 12:58:00





             Test Item    Value        Reference Range Interpretation Comments

 

             CO2 (test code = CO2) 23           24-32                     



[place] 422952-04-12 12:58:00





             Test Item    Value        Reference Range Interpretation Comments

 

             Calcium Lvl (test code = Calcium Lvl) 9.0          8.5-10.5        

          



[place] 422952-04-12 12:58:00





             Test Item    Value        Reference Range Interpretation Comments

 

             AGAP (test code = AGAP) 11.6         10.0-20.0                 



[place] 422952-04-12 12:58:00





             Test Item    Value        Reference Range Interpretation Comments

 

             eGFR (test code = eGFR) 116                                    



[place] 731572-04-12 12:58:00





             Test Item    Value        Reference Range Interpretation Comments

 

             WBC (test code = WBC) 10.5         3.7-10.4                  



[place] 731572-04-12 12:58:00





             Test Item    Value        Reference Range Interpretation Comments

 

             RBC (test code = RBC) 5.48         4.70-6.10                 



[place] [address]-04-12 12:58:00





             Test Item    Value        Reference Range Interpretation Comments

 

             Hgb (test code = Hgb) 16.0         14.0-18.0                 



[place] [address]-04-12 12:58:00





             Test Item    Value        Reference Range Interpretation Comments

 

             Hct (test code = Hct) 49.8         42.0-54.0                 



[place] [address]-04-12 12:58:00





             Test Item    Value        Reference Range Interpretation Comments

 

             MCV (test code = MCV) 90.8         80.0-94.0                 



[place] [address]-04-12 12:58:00





             Test Item    Value        Reference Range Interpretation Comments

 

             MCH (test code = MCH) 29.1 pg      27.0-31.0                 



[place] 731572-04-12 12:58:00





             Test Item    Value        Reference Range Interpretation Comments

 

             MCHC (test code = MCHC) 32.1         32.0-36.0                 



[place] [address]-04-12 12:58:00





             Test Item    Value        Reference Range Interpretation Comments

 

             RDW (test code = RDW) 15.5         11.5-14.5                 



[facility]





             Test Item    Value        Reference Range Interpretation Comments

 

             Platelet (test code = Platelet) 312          133-450               

    



[place] 731572-04-12 12:58:00





             Test Item    Value        Reference Range Interpretation Comments

 

             MPV (test code = MPV) 8.3          7.4-10.4                  



[place] 731572-04-12 12:58:00





             Test Item    Value        Reference Range Interpretation Comments

 

             PT (test code = PT) 12.9 s       12.0-14.7                 



[place] 731572-04-12 12:58:00





             Test Item    Value        Reference Range Interpretation Comments

 

             INR (test code = INR) 0.98 1       0.85-1.17                 



[place] 731572-04-12 12:58:00





             Test Item    Value        Reference Range Interpretation Comments

 

             PTT (test code = PTT) 27.3 s       22.9-35.8                 



[place] 731572-04-12 12:58:00





             Test Item    Value        Reference Range Interpretation Comments

 

             Segs (test code = Segs) 71.2         45.0-75.0                 



[place] 731572-04-12 12:58:00





             Test Item    Value        Reference Range Interpretation Comments

 

             Lymphocytes (test code = Lymphocytes) 21.1         20.0-40.0       

          



[facility] 12[facility]58:00





             Test Item    Value        Reference Range Interpretation Comments

 

             Monocytes (test code = Monocytes) 6.8          2.0-12.0            

      



[place] 731572-04-12 12:58:00





             Test Item    Value        Reference Range Interpretation Comments

 

             Eosinophils (test code = 0.1          See_Comment                [A

utomated message] The



             Eosinophils)                                        system which ge

nerated



                                                                 this result tra

nsmitted



                                                                 reference range

: <=4.0.



                                                                 The reference r

zhen was



                                                                 not used to int

erpret



                                                                 this result as



                                                                 normal/abnormal

.



[place] 731572-04-12 12:58:00





             Test Item    Value        Reference Range Interpretation Comments

 

             Basophils (test code = 0.8          See_Comment                [Aut

omated message] The



             Basophils)                                          system which ge

nerated



                                                                 this result tra

nsmitted



                                                                 reference range

: <=1.0.



                                                                 The reference r

zhen was



                                                                 not used to int

erpret



                                                                 this result as



                                                                 normal/abnormal

.



[place] [address]-04-12 12:58:00





             Test Item    Value        Reference Range Interpretation Comments

 

             Neutrophils # (test code = Neutrophils 7.5          1.5-8.1        

           



             #)                                                  



[place] 731572-04-12 12:58:00





             Test Item    Value        Reference Range Interpretation Comments

 

             Lymphocytes # (test code = Lymphocytes 2.2          1.0-5.5        

           



             #)                                                  



[place] 731572-04-12 12:58:00





             Test Item    Value        Reference Range Interpretation Comments

 

             Monocytes # (test code 0.7          See_Comment                [Aut

omated message] The



             = Monocytes #)                                        system which 

generated



                                                                 this result tra

nsmitted



                                                                 reference range

: <=0.8.



                                                                 The reference r

zhen was



                                                                 not used to int

erpret



                                                                 this result as



                                                                 normal/abnormal

.



[place] [address]-04-12 12:58:00





             Test Item    Value        Reference Range Interpretation Comments

 

             Basophils # (test code 0.1          See_Comment                [Aut

omated message] The



             = Basophils #)                                        system which 

generated



                                                                 this result tra

nsmitted



                                                                 reference range

: <=0.2.



                                                                 The reference r

zhen was



                                                                 not used to int

erpret



                                                                 this result as



                                                                 normal/abnormal

.



[place] 422952-04-12 12:58:00





             Test Item    Value        Reference Range Interpretation Comments

 

             Glucose Lvl (test code = Glucose Lvl) 228          70-99           

          



[place] 422952-04-12 12:58:00





             Test Item    Value        Reference Range Interpretation Comments

 

             BUN (test code = BUN) 23           7-22                      



[place] 422952-04-12 12:58:00





             Test Item    Value        Reference Range Interpretation Comments

 

             Creatinine Lvl (test code = Creatinine 0.78         0.50-1.40      

           



             Lvl)                                                



[place] 422952-04-12 12:58:00





             Test Item    Value        Reference Range Interpretation Comments

 

             Sodium Lvl (test code = Sodium Lvl) 138          135-145           

        



[place] 422952-04-12 12:58:00





             Test Item    Value        Reference Range Interpretation Comments

 

             Potassium Lvl (test code = Potassium 4.6          3.5-5.1          

         



             Lvl)                                                



[place] 422952-04-12 12:58:00





             Test Item    Value        Reference Range Interpretation Comments

 

             Chloride Lvl (test code = Chloride Lvl) 108                  

            



[place] 422952-04-12 12:58:00





             Test Item    Value        Reference Range Interpretation Comments

 

             CO2 (test code = CO2) 23           24-32                     



[facility]2022-04-12 12:58:00





             Test Item    Value        Reference Range Interpretation Comments

 

             Calcium Lvl (test code = Calcium Lvl) 9.0          8.5-10.5        

          



[facility]2022-04-12 12:58:00





             Test Item    Value        Reference Range Interpretation Comments

 

             AGAP (test code = AGAP) 11.6         10.0-20.0                 



[facility]2022-04-12 12:58:00





             Test Item    Value        Reference Range Interpretation Comments

 

             eGFR (test code = eGFR) 116                                    



[facility]zif[facility]ckWYAVYTEPSU3065[facility]02-59 12:58:00





             Test Item    Value        Reference Range Interpretation Comments

 

             WBC (test code = WBC) 10.5         3.7-10.4                  



[facility] ArlingtonVvksouqMSAYYXTNET9709-08-49 12:58:00





             Test Item    Value        Reference Range Interpretation Comments

 

             RBC (test code = RBC) 5.48         4.70-6.10                 



[facility]vnpalUYCCCMWYTG4018-00-56 12:58:00





             Test Item    Value        Reference Range Interpretation Comments

 

             Hgb (test code = Hgb) 16.0         14.0-18.0                 



[facility] ArlingtonKbecbncKACNNLVEFI9424-35-19 12:58:00





             Test Item    Value        Reference Range Interpretation Comments

 

             Hct (test code = Hct) 49.8         42.0-54.0                 



[facility] ArlingtonHutuoqsWHWDRCFURQ6596-48-80 12:58:00





             Test Item    Value        Reference Range Interpretation Comments

 

             MCV (test code = MCV) 90.8         80.0-94.0                 



[place] 731572-04-12 12:58:00





             Test Item    Value        Reference Range Interpretation Comments

 

             MCH (test code = MCH) 29.1 pg      27.0-31.0                 



[place] 731572-04-12 12:58:00





             Test Item    Value        Reference Range Interpretation Comments

 

             MCHC (test code = MCHC) 32.1         32.0-36.0                 



[place] 731572-04-12 12:58:00





             Test Item    Value        Reference Range Interpretation Comments

 

             RDW (test code = RDW) 15.5         11.5-14.5                 



[facility] Arlington[facility]zgmzewJEBEKLNAWM4159-83-91 12:58:00





             Test Item    Value        Reference Range Interpretation Comments

 

             Platelet (test code = Platelet) 312          133-450               

    



[place] 731572-04-12 12:58:00





             Test Item    Value        Reference Range Interpretation Comments

 

             MPV (test code = MPV) 8.3          7.4-10.4                  



[place] [address]-04-12 12:58:00





             Test Item    Value        Reference Range Interpretation Comments

 

             PT (test code = PT) 12.9 s       12.0-14.7                 



[place] [address]-04-12 12:58:00





             Test Item    Value        Reference Range Interpretation Comments

 

             INR (test code = INR) 0.98 1       0.85-1.17                 



[place] [address]-04-12 12:58:00





             Test Item    Value        Reference Range Interpretation Comments

 

             PTT (test code = PTT) 27.3 s       22.9-35.8                 



[place] [address]-04-12 12:58:00





             Test Item    Value        Reference Range Interpretation Comments

 

             Segs (test code = Segs) 71.2         45.0-75.0                 



[place] [address]-04-12 12:58:00





             Test Item    Value        Reference Range Interpretation Comments

 

             Lymphocytes (test code = Lymphocytes) 21.1         20.0-40.0       

          



[place] [address]-04-12 12:58:00





             Test Item    Value        Reference Range Interpretation Comments

 

             Monocytes (test code = Monocytes) 6.8          2.0-12.0            

      



[place] [address]-04-12 12:58:00





             Test Item    Value        Reference Range Interpretation Comments

 

             Eosinophils (test code = 0.1          See_Comment                [A

utomated message] The



             Eosinophils)                                        system which ge

nerated



                                                                 this result tra

nsmitted



                                                                 reference range

: <=4.0.



                                                                 The reference r

zhen was



                                                                 not used to int

erpret



                                                                 this result as



                                                                 normal/abnormal

.



[place] [address]-04-12 12:58:00





             Test Item    Value        Reference Range Interpretation Comments

 

             Basophils (test code = 0.8          See_Comment                [Aut

omated message] The



             Basophils)                                          system which ge

nerated



                                                                 this result tra

nsmitted



                                                                 reference range

: <=1.0.



                                                                 The reference r

zhen was



                                                                 not used to int

erpret



                                                                 this result as



                                                                 normal/abnormal

.



[place] [address]-04-12 12:58:00





             Test Item    Value        Reference Range Interpretation Comments

 

             Neutrophils # (test code = Neutrophils 7.5          1.5-8.1        

           



             #)                                                  



[place]1572-04-12 12:58:00





             Test Item    Value        Reference Range Interpretation Comments

 

             Lymphocytes # (test code = Lymphocytes 2.2          1.0-5.5        

           



             #)                                                  



[facility] ArlingtonHwgnxreZYYUWCKHFT7241-04-68 12:58:00





             Test Item    Value        Reference Range Interpretation Comments

 

             Monocytes # (test code 0.7          See_Comment                [Aut

omated message] The



             = Monocytes #)                                        system which 

generated



                                                                 this result tra

nsmitted



                                                                 reference range

: <=0.8.



                                                                 The reference r

zhen was



                                                                 not used to int

erpret



                                                                 this result as



                                                                 normal/abnormal

.



[facility]jmovlhZFSCDPHOXQ7292-[facility]-33 12:58:00





             Test Item    Value        Reference Range Interpretation Comments

 

             Basophils # (test code 0.1          See_Comment                [Aut

omated message] The



             = Basophils #)                                        system which 

generated



                                                                 this result tra

nsmitted



                                                                 reference range

: <=0.2.



                                                                 The reference r

zhen was



                                                                 not used to int

erpret



                                                                 this result as



                                                                 normal/abnormal

.



[facility] [id number] 11:59:00





             Test Item    Value        Reference Range Interpretation Comments

 

             Tube Num CSF (test code = Tube Num CSF) 3 1                        

            



[facility]2022-04-12 11:59:00





             Test Item    Value        Reference Range Interpretation Comments

 

             Color CSF (test code Colorless (22 6:59                       

    



             = Color CSF) AM)                                    



[facility]2022-04-12 11:59:00





             Test Item    Value        Reference Range Interpretation Comments

 

             Clarity CSF (test code = Clear (22 6:59                       

    



             Clarity CSF) AM)                                    



[facility]2022-04-12 11:59:00





             Test Item    Value        Reference Range Interpretation Comments

 

             Supernat CSF (test Colorless (22 6:59                         

  



             code = Supernat CSF) AM)                                    



[place] [id number] 11:59:00





             Test Item    Value        Reference Range Interpretation Comments

 

             Nucleated Cells CSF 3            See_Comment                [Automa

huma message] The



             (test code = Nucleated                                        syste

m which generated



             Cells CSF)                                          this result tra

nsmitted



                                                                 reference range

: <=53.



                                                                 The reference r

zhen was



                                                                 not used to int

erpret



                                                                 this result as



                                                                 normal/abnormal

.



[facility] [id number] 11:59:00





             Test Item    Value        Reference Range Interpretation Comments

 

             RBC CSF (test code = 64           See_Comment                [Autom

ated message] The



             RBC CSF)                                            system which ge

nerated this



                                                                 result transmit

huma



                                                                 reference range

: <=03. The



                                                                 reference range

 was not



                                                                 used to interpr

et this



                                                                 result as zayda

l/abnormal.



[facility] [id number] 11:59:00





             Test Item    Value        Reference Range Interpretation Comments

 

             Comment CSF (test Differential not performed                       

    



             code = Comment CSF) on WBC count of less than                      

     



                          5. Cell counts performed                           



                          on CSF greater than two                           



                          hours after collection may                           



                          not be representative due                           



                          to cellular degradation.                           



[facility] [id number] 11:59:00





             Test Item    Value        Reference Range Interpretation Comments

 

             Glucose CSF (test code = Glucose CSF) 129          45-80           

          



[facility] [id number] 11:59:00





             Test Item    Value        Reference Range Interpretation Comments

 

             Protein CSF (test code = Protein CSF) 110          15-45           

          



[facility] [id number] 11:59:00





             Test Item    Value        Reference Range Interpretation Comments

 

             Gram Stain Report Gram Stain Performed By:                         

  



             (test code = Gram [facility]                           



             Stain Report) [facility] w/Gram [id number] 11:59:00





             Test Item    Value        Reference Range Interpretation Comments

 

             Culture: CSF w/Gram 48 Hour Report - No                           



             Stain (test code = Growth, Holding                           



             Culture: CSF w/Gram                                        



             Stain)                                              



[facility]2022-04-12 11:59:00





             Test Item    Value        Reference Range Interpretation Comments

 

             Tube Num CSF (test code = Tube Num CSF) 3 1                        

            



[facility] [id number] 11:59:00





             Test Item    Value        Reference Range Interpretation Comments

 

             Color CSF (test code Colorless (22 6:59                       

    



             = Color CSF) AM)                                    



[facility]2022-04-12 11:59:00





             Test Item    Value        Reference Range Interpretation Comments

 

             Clarity CSF (test code = Clear (22 6:59                       

    



             Clarity CSF) AM)                                    



[facility]2022-04-12 11:59:00





             Test Item    Value        Reference Range Interpretation Comments

 

             Supernat CSF (test Colorless (22 6:59                         

  



             code = Supernat CSF) AM)                                    



[facility]2022-04-12 11:59:00





             Test Item    Value        Reference Range Interpretation Comments

 

             Nucleated Cells CSF 3            See_Comment                [Automa

huma message] The



             (test code = Nucleated                                        syste

m which generated



             Cells CSF)                                          this result tra

nsmitted



                                                                 reference range

: <=53.



                                                                 The reference r

zhen was



                                                                 not used to int

erpret



                                                                 this result as



                                                                 normal/abnormal

.



[facility] [id number] 11:59:00





             Test Item    Value        Reference Range Interpretation Comments

 

             RBC CSF (test code = 64           See_Comment                [Autom

ated message] The



             RBC CSF)                                            system which ge

nerated this



                                                                 result transmit

huma



                                                                 reference range

: <=03. The



                                                                 reference range

 was not



                                                                 used to interpr

et this



                                                                 result as zayda

l/abnormal.



[facility] [id number] 11:59:00





             Test Item    Value        Reference Range Interpretation Comments

 

             Comment CSF (test Differential not performed                       

    



             code = Comment CSF) on WBC count of less than                      

     



                          5. Cell counts performed                           



                          on CSF greater than two                           



                          hours after collection may                           



                          not be representative due                           



                          to cellular degradation.                           



[facility] [id number] 11:59:00





             Test Item    Value        Reference Range Interpretation Comments

 

             Glucose CSF (test code = Glucose CSF) 129          45-80           

          



[facility] [id number] 11:59:00





             Test Item    Value        Reference Range Interpretation Comments

 

             Protein CSF (test code = Protein CSF) 110          15-45           

          



[facility] Mzimre1509-19-26 11:59:00





             Test Item    Value        Reference Range Interpretation Comments

 

             Gram Stain Report Gram Stain Performed By:                         

  



             (test code = Gram [facility]                           



             Stain Report) [facility] w/Gram [id number] 11:59:00





             Test Item    Value        Reference Range Interpretation Comments

 

             Culture: CSF w/Gram 48 Hour Report - No                           



             Stain (test code = Growth, Holding                           



             Culture: CSF w/Gram                                        



             Stain)                                              



[facility] [id number] 14:46:00





             Test Item    Value        Reference Range Interpretation Comments

 

             Hgb A1C (test code = Hgb A1C) 5.6                                  

  



[facility] [id number] 14:46:00





             Test Item    Value        Reference Range Interpretation Comments

 

             Hgb A1C (test code = Hgb A1C) 5.6                                  

  



[facility] CenterTradeSyncCHEM [id number] 11:43:00





             Test Item    Value        Reference Range Interpretation Comments

 

             Glucose Lvl (test code = Glucose Lvl) 418          70-99           

          



[facility] HospitalGrid[facility] [id number] 11:43:00





             Test Item    Value        Reference Range Interpretation Comments

 

             BUN (test code = BUN) 22           7-22                      



[facility]Log[facility] [id number] 11:43:00





             Test Item    Value        Reference Range Interpretation Comments

 

             Creatinine Lvl (test code = Creatinine 1.10         0.50-1.40      

           



             Lvl)                                                



[facility]2022-04-11 11:43:00





             Test Item    Value        Reference Range Interpretation Comments

 

             Sodium Lvl (test code = Sodium Lvl) 138          135-145           

        



[facility]2022-04-11 11:43:00





             Test Item    Value        Reference Range Interpretation Comments

 

             Potassium Lvl (test code = Potassium 4.9          3.5-5.1          

         



             Lvl)                                                



[place] 422952-04-11 11:43:00





             Test Item    Value        Reference Range Interpretation Comments

 

             Chloride Lvl (test code = Chloride Lvl) 113                  

            



[place] 422952-04-11 11:43:00





             Test Item    Value        Reference Range Interpretation Comments

 

             CO2 (test code = CO2) 16           24-32                     



[place] 422952-04-11 11:43:00





             Test Item    Value        Reference Range Interpretation Comments

 

             AGAP (test code = AGAP) 13.9         10.0-20.0                 



[place] 422952-04-11 11:43:00





             Test Item    Value        Reference Range Interpretation Comments

 

             Calcium Lvl (test code = Calcium Lvl) 9.0          8.5-10.5        

          



[facility]2022-04-11 11:43:00





             Test Item    Value        Reference Range Interpretation Comments

 

             eGFR (test code = eGFR) 86                                     



[facility] ArlingtonBlkfzqwHQPUWPSUQN9890-41-72 11:43:00





             Test Item    Value        Reference Range Interpretation Comments

 

             WBC (test code = WBC) 10.2         3.7-10.4                  



[facility] ArlingtonBewvfruUYUKCWVXQN8796-30-93 11:43:00





             Test Item    Value        Reference Range Interpretation Comments

 

             RBC (test code = RBC) 5.46         4.70-6.10                 



[facility] ArlingtonDrpqfguFPRZWXTSSM5072-99-75 11:43:00





             Test Item    Value        Reference Range Interpretation Comments

 

             Hgb (test code = Hgb) 16.3         14.0-18.0                 



[place] [address]-04-11 11:43:00





             Test Item    Value        Reference Range Interpretation Comments

 

             Hct (test code = Hct) 50.0         42.0-54.0                 



[place] 731572-04-11 11:43:00





             Test Item    Value        Reference Range Interpretation Comments

 

             MCV (test code = MCV) 91.4         80.0-94.0                 



[place] 731572-04-11 11:43:00





             Test Item    Value        Reference Range Interpretation Comments

 

             MCH (test code = MCH) 29.9 pg      27.0-31.0                 



[facility] ArlingtonKylvsknOXKHRLGIJE0708-29-26 11:43:00





             Test Item    Value        Reference Range Interpretation Comments

 

             MCHC (test code = MCHC) 32.7         32.0-36.0                 



[facility] ArlingtonAitettoBZSRMFPNDO8591-29-58 11:43:00





             Test Item    Value        Reference Range Interpretation Comments

 

             RDW (test code = RDW) 15.7         11.5-14.5                 



[facility] ArlingtonMegfuifOYENPAMUPZ8825-28-70 11:43:00





             Test Item    Value        Reference Range Interpretation Comments

 

             Platelet (test code = Platelet) 321          133-450               

    



[facility] ArlingtonOcmvgvjPLWGLVAUUS7541-18-76 11:43:00





             Test Item    Value        Reference Range Interpretation Comments

 

             MPV (test code = MPV) 8.6          7.4-10.4                  



[facility] ArlingtonMgwaieyDATZWJOEEI9643-32-58 11:43:00





             Test Item    Value        Reference Range Interpretation Comments

 

             Segs (test code = Segs) 92.0         45.0-75.0                 



[facility] ArlingtonThhmwatKITYIQAVGR9482-30-48 11:43:00





             Test Item    Value        Reference Range Interpretation Comments

 

             Lymphocytes (test code = Lymphocytes) 5.2          20.0-40.0       

          



[facility] ArlingtonMbrmsnrSHKFFNKRYN2402-24-59 11:43:00





             Test Item    Value        Reference Range Interpretation Comments

 

             Monocytes (test code = Monocytes) 1.6          2.0-12.0            

      



[facility] ArlingtonVdmxgfrMXMZLUHYBI2841-71-45 11:43:00





             Test Item    Value        Reference Range Interpretation Comments

 

             Basophils (test code = 1.2          See_Comment                [Aut

omated message] The



             Basophils)                                          system which ge

nerated



                                                                 this result tra

nsmitted



                                                                 reference range

: <=1.0.



                                                                 The reference r

zhen was



                                                                 not used to int

erpret



                                                                 this result as



                                                                 normal/abnormal

.



[facility] ArlingtonEdweffpJXRKRNQNMV6093-35-57 11:43:00





             Test Item    Value        Reference Range Interpretation Comments

 

             Neutrophils # (test code = Neutrophils 9.4          1.5-8.1        

           



             #)                                                  



[facility] ArlingtonCzdnpvuQTJLNTHZSX4836-03-90 11:43:00





             Test Item    Value        Reference Range Interpretation Comments

 

             Lymphocytes # (test code = Lymphocytes 0.5          1.0-5.5        

           



             #)                                                  



[facility] ArlingtonBnavqoeIZVFRAOWYT9331-75-34 11:43:00





             Test Item    Value        Reference Range Interpretation Comments

 

             Monocytes # (test code 0.2          See_Comment                [Aut

omated message] The



             = Monocytes #)                                        system which 

generated



                                                                 this result tra

nsmitted



                                                                 reference range

: <=0.8.



                                                                 The reference r

zhen was



                                                                 not used to int

erpret



                                                                 this result as



                                                                 normal/abnormal

.



[facility] ArlingtonMivgvhoARKQPZMYQP8520-01-31 11:43:00





             Test Item    Value        Reference Range Interpretation Comments

 

             Basophils # (test code 0.1          See_Comment                [Aut

omated message] The



             = Basophils #)                                        system which 

generated



                                                                 this result tra

nsmitted



                                                                 reference range

: <=0.2.



                                                                 The reference r

zhen was



                                                                 not used to int

erpret



                                                                 this result as



                                                                 normal/abnormal

.



[facility]2022-04-11 11:43:00





             Test Item    Value        Reference Range Interpretation Comments

 

             Glucose Lvl (test code = Glucose Lvl) 418          70-99           

          



[facility]2022-04-11 11:43:00





             Test Item    Value        Reference Range Interpretation Comments

 

             BUN (test code = BUN) 22           7-22                      



[facility]2022-04-11 11:43:00





             Test Item    Value        Reference Range Interpretation Comments

 

             Creatinine Lvl (test code = Creatinine 1.10         0.50-1.40      

           



             Lvl)                                                



[facility]2022-04-11 11:43:00





             Test Item    Value        Reference Range Interpretation Comments

 

             Sodium Lvl (test code = Sodium Lvl) 138          135-145           

        



[facility]2022-04-11 11:43:00





             Test Item    Value        Reference Range Interpretation Comments

 

             Potassium Lvl (test code = Potassium 4.9          3.5-5.1          

         



             Lvl)                                                



[facility]2022-04-11 11:43:00





             Test Item    Value        Reference Range Interpretation Comments

 

             Chloride Lvl (test code = Chloride Lvl) 113                  

            



[facility]2022-04-11 11:43:00





             Test Item    Value        Reference Range Interpretation Comments

 

             CO2 (test code = CO2) 16           24-32                     



[facility] [id number] 11:43:00





             Test Item    Value        Reference Range Interpretation Comments

 

             AGAP (test code = AGAP) 13.9         10.0-20.0                 



[place] 422952-04-11 11:43:00





             Test Item    Value        Reference Range Interpretation Comments

 

             Calcium Lvl (test code = Calcium Lvl) 9.0          8.5-10.5        

          



[place] 422952-04-11 11:43:00





             Test Item    Value        Reference Range Interpretation Comments

 

             eGFR (test code = eGFR) 86                                     



[facility] ArlingtonLfokviwJOYJOVQYGC5150-37-58 11:43:00





             Test Item    Value        Reference Range Interpretation Comments

 

             WBC (test code = WBC) 10.2         3.7-10.4                  



[facility] ArlingtonZalpfsvWPZUEOSPDX0922-15-21 11:43:00





             Test Item    Value        Reference Range Interpretation Comments

 

             RBC (test code = RBC) 5.46         4.70-6.10                 



[facility] ArlingtonHiohssaSAWLYSCZCZ1465-10-60 11:43:00





             Test Item    Value        Reference Range Interpretation Comments

 

             Hgb (test code = Hgb) 16.3         14.0-18.0                 



[facility] ArlingtonTglfpdaFWAWGWJAMA2503-37-87 11:43:00





             Test Item    Value        Reference Range Interpretation Comments

 

             Hct (test code = Hct) 50.0         42.0-54.0                 



[facility] ArlingtonAdwempjBYNOORRAJI9436-51-16 11:43:00





             Test Item    Value        Reference Range Interpretation Comments

 

             MCV (test code = MCV) 91.4         80.0-94.0                 



[facility] ArlingtonQkbcgxwBYDKLHUAGQ2293-87-18 11:43:00





             Test Item    Value        Reference Range Interpretation Comments

 

             MCH (test code = MCH) 29.9 pg      27.0-31.0                 



[facility] ArlingtonIswhzjoIYDLPSWHGW3002-25-79 11:43:00





             Test Item    Value        Reference Range Interpretation Comments

 

             MCHC (test code = MCHC) 32.7         32.0-36.0                 



[facility] ArlingtonGxwusykPYUXOBCJMS1242-72-73 11:43:00





             Test Item    Value        Reference Range Interpretation Comments

 

             RDW (test code = RDW) 15.7         11.5-14.5                 



[facility] ArlingtonBbwfvhuQOXLWYGCDP0375-26-18 11:43:00





             Test Item    Value        Reference Range Interpretation Comments

 

             Platelet (test code = Platelet) 321          133-450               

    



[facility]





             Test Item    Value        Reference Range Interpretation Comments

 

             MPV (test code = MPV) 8.6          7.4-10.4                  



[facility]KwowvviGLVBZQGXZR8829-25-05 11:43:00





             Test Item    Value        Reference Range Interpretation Comments

 

             Segs (test code = Segs) 92.0         45.0-75.0                 



[facility] ArlingtonUjowqbsGRCTCXKCSW0933-82-03 11:43:00





             Test Item    Value        Reference Range Interpretation Comments

 

             Lymphocytes (test code = Lymphocytes) 5.2          20.0-40.0       

          



[place]1572-04-11 11:43:00





             Test Item    Value        Reference Range Interpretation Comments

 

             Monocytes (test code = Monocytes) 1.6          2.0-12.0            

      



[place] 731572-04-11 11:43:00





             Test Item    Value        Reference Range Interpretation Comments

 

             Basophils (test code = 1.2          See_Comment                [Aut

omated message] The



             Basophils)                                          system which ge

nerated



                                                                 this result tra

nsmitted



                                                                 reference range

: <=1.0.



                                                                 The reference r

zhen was



                                                                 not used to int

erpret



                                                                 this result as



                                                                 normal/abnormal

.



[facility]dbTCMGWSAGIO0284-[facility] 11:43:00





             Test Item    Value        Reference Range Interpretation Comments

 

             Neutrophils # (test code = Neutrophils 9.4          1.5-8.1        

           



             #)                                                  



[place] 731572-04-11 11:43:00





             Test Item    Value        Reference Range Interpretation Comments

 

             Lymphocytes # (test code = Lymphocytes 0.5          1.0-5.5        

           



             #)                                                  



[place] 731572-04-11 11:43:00





             Test Item    Value        Reference Range Interpretation Comments

 

             Monocytes # (test code 0.2          See_Comment                [Aut

omated message] The



             = Monocytes #)                                        system which 

generated



                                                                 this result tra

nsmitted



                                                                 reference range

: <=0.8.



                                                                 The reference r

zhen was



                                                                 not used to int

erpret



                                                                 this result as



                                                                 normal/abnormal

.



[place] 731572-04-11 11:43:00





             Test Item    Value        Reference Range Interpretation Comments

 

             Basophils # (test code 0.1          See_Comment                [Aut

omated message] The



             = Basophils #)                                        system which 

generated



                                                                 this result tra

nsmitted



                                                                 reference range

: <=0.2.



                                                                 The reference r

zhen was



                                                                 not used to int

erpret



                                                                 this result as



                                                                 normal/abnormal

.



[facility]





             Test Item    Value        Reference Range Interpretation Comments

 

             Coronavirus (COVID-19) Not Detected (22                        

   



             ZEYAD (test code = 4:18 AM)                               



             Coronavirus (COVID-19)                                        



             ZEYAD)                                                



[place] [address]-04-09 09:18:00





             Test Item    Value        Reference Range Interpretation Comments

 

             Coronavirus (COVID-19) Not Detected (22                        

   



             ZEYAD (test code = 4:18 AM)                               



             Coronavirus (COVID-19)                                        



             ZEYAD)                                                



[facility]PT:ISOLATE:ORDQN:[id number] 08:19:00





             Test Item    Value        Reference Range Interpretation Comments

 

             Culture: Urine (test >100,000 CFU/mL                           



             code = Culture: Providencia stuartii                           



             Urine)                                              



Tamia GarciaKARYNAFTRIAXONE:SUSC:PT:ISOLATE:ORDQN:[id number] 08:19:00





             Test Item    Value        Reference Range Interpretation Comments

 

             Providencia stuartii Providencia stuartii                          

 



             (test code = Providencia                                        



             stuartii)                                           



[facility] AND [id number] 08:19:00





             Test Item    Value        Reference Range Interpretation Comments

 

             UA Color (test code = Yellow *NA*(22 3:19                      

     



             UA Color)    AM)                                    



[facility] AND [id number] 08:19:00





             Test Item    Value        Reference Range Interpretation Comments

 

             UA Turbidity (test code Marked *ABN*(22                        

   



             = UA Turbidity) 3:19 AM)                               



[facility] AND [id number] 08:19:00





             Test Item    Value        Reference Range Interpretation Comments

 

             UA Spec Grav (test code = UA Spec 1.013 1                          

      



             Grav)                                               



[facility] AND [id number] 08:19:00





             Test Item    Value        Reference Range Interpretation Comments

 

             UA pH (test code = UA pH) 5.0 1        5.0-8.0                   



[facility] AND [id number] 08:19:00





             Test Item    Value        Reference Range Interpretation Comments

 

             UA Protein (test code = UA Negative mg/dL                          

 



             Protein)                                            



[facility] AND [id number] 08:19:00





             Test Item    Value        Reference Range Interpretation Comments

 

             UA Glucose (test code = UA Negative mg/dL                          

 



             Glucose)                                            



[facility] AND [id number] 08:19:00





             Test Item    Value        Reference Range Interpretation Comments

 

             UA Ketones (test code = UA Negative mg/dL                          

 



             Ketones)                                            



[facility] AND [id number] 08:19:00





             Test Item    Value        Reference Range Interpretation Comments

 

             UA Bili (test code = Negative *NA*(22                          

 



             UA Bili)     3:19 AM)                               



[facility] AND [id number] 08:19:00





             Test Item    Value        Reference Range Interpretation Comments

 

             UA Blood (test code = Small *ABN*(22                           



             UA Blood)    3:19 AM)                               



[facility] [id number] 08:19:00





             Test Item    Value        Reference Range Interpretation Comments

 

             UA Urobilinogen (test code = UA no gt        0.1-1.0               

    



             Urobilinogen)                                        



[facility] [id number] 08:19:00





             Test Item    Value        Reference Range Interpretation Comments

 

             UA Nitrite (test code Positive *ABN*(22                        

   



             = UA Nitrite) 3:19 AM)                               



[facility] [id number] 08:19:00





             Test Item    Value        Reference Range Interpretation Comments

 

             UA Leuk Est (test code Large *ABN*(22 3:19                     

      



             = UA Leuk Est) AM)                                    



[facility] [id number] 08:19:00





             Test Item    Value        Reference Range Interpretation Comments

 

             UA WBC (test code = no gt        See_Comment                [Automa

huma message] The



             UA WBC)                                             system which ge

nerated this



                                                                 result transmit

huma



                                                                 reference range

: <=5. The



                                                                 reference range

 was not



                                                                 used to interpr

et this



                                                                 result as zayda

l/abnormal.



[facility] [id number] 08:19:00





             Test Item    Value        Reference Range Interpretation Comments

 

             UA RBC (test code = 8            See_Comment                [Automa

huma message] The



             UA RBC)                                             system which ge

nerated this



                                                                 result transmit

huma



                                                                 reference range

: <=2. The



                                                                 reference range

 was not



                                                                 used to interpr

et this



                                                                 result as zayda

l/abnormal.



[facility] [id number] 08:19:00





             Test Item    Value        Reference Range Interpretation Comments

 

             UA Bacteria (test code = UA Occasional /HPF                        

   



             Bacteria)                                           



[facility] [id number] 08:19:00





             Test Item    Value        Reference Range Interpretation Comments

 

             UA Mucus (test code = UA Mucus) Few /LPF                           

    



[facility] [id number] 08:19:00





             Test Item    Value        Reference Range Interpretation Comments

 

             UA Amorph Ivonne (test code = Occasional /HPF                        

   



             UA Amorph Ivonne)                                        



[facility] [id number] 08:19:00





             Test Item    Value        Reference Range Interpretation Comments

 

             UA Sq Epi (test code = UA Sq Epi) None Seen                        

      



[facility] [id number] 08:19:00





             Test Item    Value        Reference Range Interpretation Comments

 

             Culture: Urine (test Holding For Better                           



             code = Culture: Urine) Growth                                 



[facility]riqueFTRIAX[facility]:SUSC:PT:ISOLATE:ORDQN:[id number] 08:19:00





             Test Item    Value        Reference Range Interpretation Comments

 

             Culture: Urine (test >100,000 CFU/mL                           



             code = Culture: Providencia stuartii                           



             Urine)                                              



[facility] KatieRIAXONE:SUSC:PT:ISOLATE:ORDQN:[id number] 08:19:00





             Test Item    Value        Reference Range Interpretation Comments

 

             Providencia stuartii Providencia stuartii                          

 



             (test code = Providencia                                        



             stuartii)                                           



[facility] [id number] 08:19:00





             Test Item    Value        Reference Range Interpretation Comments

 

             UA Color (test code = Yellow *NA*(22 3:19                      

     



             UA Color)    AM)                                    



[facility] [id number] 08:19:00





             Test Item    Value        Reference Range Interpretation Comments

 

             UA Turbidity (test code Marked *ABN*(22                        

   



             = UA Turbidity) 3:19 AM)                               



[facility] [id number] 08:19:00





             Test Item    Value        Reference Range Interpretation Comments

 

             UA Spec Grav (test code = UA Spec 1.013 1                          

      



             Grav)                                               



[facility] [id number] 08:19:00





             Test Item    Value        Reference Range Interpretation Comments

 

             UA pH (test code = UA pH) 5.0 1        5.0-8.0                   



[facility] [id number] 08:19:00





             Test Item    Value        Reference Range Interpretation Comments

 

             UA Protein (test code = UA Negative mg/dL                          

 



             Protein)                                            



[facility] [id number] 08:19:00





             Test Item    Value        Reference Range Interpretation Comments

 

             UA Glucose (test code = UA Negative mg/dL                          

 



             Glucose)                                            



[facility] [id number] 08:19:00





             Test Item    Value        Reference Range Interpretation Comments

 

             UA Ketones (test code = UA Negative mg/dL                          

 



             Ketones)                                            



[facility] [id number] 08:19:00





             Test Item    Value        Reference Range Interpretation Comments

 

             UA Bili (test code = Negative *NA*(22                          

 



             UA Bili)     3:19 AM)                               



[facility] [id number] 08:19:00





             Test Item    Value        Reference Range Interpretation Comments

 

             UA Blood (test code = Small *ABN*(22                           



             UA Blood)    3:19 AM)                               



[facility] [id number] 08:19:00





             Test Item    Value        Reference Range Interpretation Comments

 

             UA Urobilinogen (test code = UA no gt        0.1-1.0               

    



             Urobilinogen)                                        



[facility] [id number] 08:19:00





             Test Item    Value        Reference Range Interpretation Comments

 

             UA Nitrite (test code Positive *ABN*(22                        

   



             = UA Nitrite) 3:19 AM)                               



[facility] [id number] 08:19:00





             Test Item    Value        Reference Range Interpretation Comments

 

             UA Leuk Est (test code Large *ABN*(22 3:19                     

      



             = UA Leuk Est) AM)                                    



[facility] [id number] 08:19:00





             Test Item    Value        Reference Range Interpretation Comments

 

             UA WBC (test code = no gt        See_Comment                [Automa

huam message] The



             UA WBC)                                             system which ge

nerated this



                                                                 result transmit

huma



                                                                 reference range

: <=5. The



                                                                 reference range

 was not



                                                                 used to interpr

et this



                                                                 result as zayda

l/abnormal.



[facility] [id number] 08:19:00





             Test Item    Value        Reference Range Interpretation Comments

 

             UA RBC (test code = 8            See_Comment                [Automa

huma message] The



             UA RBC)                                             system which ge

nerated this



                                                                 result transmit

huma



                                                                 reference range

: <=2. The



                                                                 reference range

 was not



                                                                 used to interpr

et this



                                                                 result as zayda

l/abnormal.



[facility] [id number] 08:19:00





             Test Item    Value        Reference Range Interpretation Comments

 

             UA Bacteria (test code = UA Occasional /HPF                        

   



             Bacteria)                                           



[facility] [id number] 08:19:00





             Test Item    Value        Reference Range Interpretation Comments

 

             UA Mucus (test code = UA Mucus) Few /LPF                           

    



[facility] [id number] 08:19:00





             Test Item    Value        Reference Range Interpretation Comments

 

             UA Amorph Ivonne (test code = Occasional /HPF                        

   



             UA Amorph Ivonne)                                        



[facility] [id number] 08:19:00





             Test Item    Value        Reference Range Interpretation Comments

 

             UA Sq Epi (test code = UA Sq Epi) None Seen                        

      



[facility] [id number] 08:19:00





             Test Item    Value        Reference Range Interpretation Comments

 

             Culture: Urine (test Holding For Better                           



             code = Culture: Urine) Growth                                 



[facility] [id number]:00





             Test Item    Value        Reference Range Interpretation Comments

 

             ABO/Rh (test code = ABO/Rh) AB POS                                 



[facility] [id number] 00:20:00





             Test Item    Value        Reference Range Interpretation Comments

 

             Antibody Scrn (test Negative (22 7:20                          

 



             code = Antibody Scrn) PM)                                    



[facility] [employer] [id number] 00:20:00





             Test Item    Value        Reference Range Interpretation Comments

 

             Glucose Lvl (test code = Glucose Lvl) 74           70-99           

          



[facility] [employer] [id number] 00:20:00





             Test Item    Value        Reference Range Interpretation Comments

 

             BUN (test code = BUN) 15           -22                      



[facility] [id number] 00:20:00





             Test Item    Value        Reference Range Interpretation Comments

 

             Creatinine Lvl (test code = Creatinine 0.61         0.50-1.40      

           



             Lvl)                                                



[facility] [employer] [id number] 00:20:00





             Test Item    Value        Reference Range Interpretation Comments

 

             Sodium Lvl (test code = Sodium Lvl) 139          135-145           

        



[facility] [employer] [id number] 00:20:00





             Test Item    Value        Reference Range Interpretation Comments

 

             Potassium Lvl (test code = Potassium 4.9          3.5-5.1          

         



             Lvl)                                                



[facility] [employer] [id number] 00:20:00





             Test Item    Value        Reference Range Interpretation Comments

 

             Chloride Lvl (test code = Chloride Lvl) 105                  

            



[facility] [employer] [id number] 00:20:00





             Test Item    Value        Reference Range Interpretation Comments

 

             CO2 (test code = CO2) 30           24-32                     



[facility] [employer] [id number] 00:20:00





             Test Item    Value        Reference Range Interpretation Comments

 

             Calcium Lvl (test code = Calcium Lvl) 9.5          8.5-10.5        

          



[facility] [employer] [id number] 00:20:00





             Test Item    Value        Reference Range Interpretation Comments

 

             AGAP (test code = AGAP) 8.9          10.0-20.0                 



[facility] [employer] [id number] 00:20:00





             Test Item    Value        Reference Range Interpretation Comments

 

             eGFR (test code = eGFR) 129                                    



[facility] [id number] 00:20:00





             Test Item    Value        Reference Range Interpretation Comments

 

             ABO/Rh (test code = ABO/Rh) AB POS                                 



[facility] [id number] 00:20:00





             Test Item    Value        Reference Range Interpretation Comments

 

             Antibody Scrn (test Negative (22 7:20                          

 



             code = Antibody Scrn) PM)                                    



[facility] [id number] 00:20:00





             Test Item    Value        Reference Range Interpretation Comments

 

             Lactic Acid Lvl (test code = Lactic 1.2          0.5-2.2           

        



             Acid Lvl)                                           



[facility] [id number] 00:20:00





             Test Item    Value        Reference Range Interpretation Comments

 

             Glucose Lvl (test code = Glucose Lvl) 74           70-99           

          



[facility] [id number] 00:20:00





             Test Item    Value        Reference Range Interpretation Comments

 

             BUN (test code = BUN) 15           -22                      



[facility]2022-04-09 00:20:00





             Test Item    Value        Reference Range Interpretation Comments

 

             Creatinine Lvl (test code = Creatinine 0.61         0.50-1.40      

           



             Lvl)                                                



[id number] [facility]





             Test Item    Value        Reference Range Interpretation Comments

 

             Sodium Lvl (test code = Sodium Lvl) 139          135-145           

        



[facility] [id number] 00:20:00





             Test Item    Value        Reference Range Interpretation Comments

 

             Potassium Lvl (test code = Potassium 4.9          3.5-5.1          

         



             Lvl)                                                



[facility] [id number] 00:20:00





             Test Item    Value        Reference Range Interpretation Comments

 

             Chloride Lvl (test code = Chloride Lvl) 105                  

            



[id number]





             Test Item    Value        Reference Range Interpretation Comments

 

             CO2 (test code = CO2) 30           24-32                     



[facility] [id number] 00:20:00





             Test Item    Value        Reference Range Interpretation Comments

 

             Calcium Lvl (test code = Calcium Lvl) 9.5          8.5-10.5        

          



[facility] NDEGU6478-66-96 [facility]20:00





             Test Item    Value        Reference Range Interpretation Comments

 

             AGAP (test code = AGAP) 8.9          10.0-20.0                 



[facility] [id number] [facility]20:00





             Test Item    Value        Reference Range Interpretation Comments

 

             eGFR (test code = eGFR) 129                                    



[facility]2022-04-09 00:20:00





             Test Item    Value        Reference Range Interpretation Comments

 

             Lactic Acid Lvl (test code = Lactic 1.2          0.5-2.2           

        



             Acid Lvl)                                           



[facility]2022-04-09 00:20:00





             Test Item    Value        Reference Range Interpretation Comments

 

             Procalcitonin Lvl (test 0.09         See_Comment                [Au

tomated message]



             code = Procalcitonin Lvl)                                        

e system which



                                                                 generated this 

result



                                                                 transmitted ref

erence



                                                                 range: <=0.10. 

The



                                                                 reference range

 was not



                                                                 used to interpr

et this



                                                                 result as



                                                                 normal/abnormal

.



[facility]gqluqMYYOJJYTOM4961[facility]39-28 [facility]





             Test Item    Value        Reference Range Interpretation Comments

 

             WBC (test code = WBC) 10.1         3.7-10.4                  



[place]1572-04-09 00:20:00





             Test Item    Value        Reference Range Interpretation Comments

 

             RBC (test code = RBC) 5.14         4.70-6.10                 



[facility] ArlingtonJhapbarAVFGPEZHMT8557-43-37 00:20:00





             Test Item    Value        Reference Range Interpretation Comments

 

             Hgb (test code = Hgb) 15.5         14.0-18.0                 



[facility] ArlingtonDkllmbuAMVEDTUVSC8548-78-81 00:20:00





             Test Item    Value        Reference Range Interpretation Comments

 

             Hct (test code = Hct) 46.5         42.0-54.0                 



[place] 731572-04-09 00:20:00





             Test Item    Value        Reference Range Interpretation Comments

 

             MCV (test code = MCV) 90.5         80.0-94.0                 



[place] 731572-04-09 00:20:00





             Test Item    Value        Reference Range Interpretation Comments

 

             MCH (test code = MCH) 30.1 pg      27.0-31.0                 



[facility] ArlingtonIpbqtqtAMMJAMFEMU6302-91-78 00:20:00





             Test Item    Value        Reference Range Interpretation Comments

 

             MCHC (test code = MCHC) 33.3         32.0-36.0                 



[facility] ArlingtonFhmhihrCQMCARVOKS0339-68-11 00:20:00





             Test Item    Value        Reference Range Interpretation Comments

 

             RDW (test code = RDW) 15.7         11.5-14.5                 



[facility]2022-04-09 00:20:00





             Test Item    Value        Reference Range Interpretation Comments

 

             Procalcitonin Lvl (test 0.09         See_Comment                [Au

tomated message]



             code = Procalcitonin Lvl)                                        Th

e system which



                                                                 generated this 

result



                                                                 transmitted ref

erence



                                                                 range: <=0.10. 

The



                                                                 reference range

 was not



                                                                 used to interpr

et this



                                                                 result as



                                                                 normal/abnormal

.



[facility] ArlingtonVouiwtbZILRRULGDI6643-92-85 00:20:00





             Test Item    Value        Reference Range Interpretation Comments

 

             Platelet (test code = Platelet) 302          133-450               

    



[facility] ArlingtonGgdmxabMQWTYXWWRC5121-51-85 00:20:00





             Test Item    Value        Reference Range Interpretation Comments

 

             MPV (test code = MPV) 8.9          7.4-10.4                  



[facility] ArlingtonDhgxezrPXRZHRYROM3310-25-31 00:20:00





             Test Item    Value        Reference Range Interpretation Comments

 

             Sed Rate (test code = 17           See_Comment                [Auto

mated message] The



             Sed Rate)                                           system which ge

nerated this



                                                                 result transmit

huma



                                                                 reference range

: <=15. The



                                                                 reference range

 was not



                                                                 used to interpr

et this



                                                                 result as zayda

l/abnormal.



[facility] ArlingtonBgwuyiuIRPZDKKICL9302-65-26 00:20:00





             Test Item    Value        Reference Range Interpretation Comments

 

             PT (test code = PT) 13.1 s       12.0-14.7                 



[facility] ArlingtonGnccoreEBNXJQOFRM3248-05-09 00:20:00





             Test Item    Value        Reference Range Interpretation Comments

 

             INR (test code = INR) 1.00 1       0.85-1.17                 



[facility] ArlingtonLqbfzrzYAZIOWPEHL3032-19-09 00:20:00





             Test Item    Value        Reference Range Interpretation Comments

 

             PTT (test code = PTT) 31.5 s       22.9-35.8                 



[place] 731572-04-09 00:20:00





             Test Item    Value        Reference Range Interpretation Comments

 

             Segs (test code = Segs) 68.7         45.0-75.0                 



[place] 731572-04-09 00:20:00





             Test Item    Value        Reference Range Interpretation Comments

 

             Lymphocytes (test code = Lymphocytes) 19.6         20.0-40.0       

          



[place] 731572-04-09 00:20:00





             Test Item    Value        Reference Range Interpretation Comments

 

             Monocytes (test code = Monocytes) 9.4          2.0-12.0            

      



[place] 731572-04-09 00:20:00





             Test Item    Value        Reference Range Interpretation Comments

 

             Eosinophils (test code = 1.4          See_Comment                [A

utomated message] The



             Eosinophils)                                        system which ge

nerated



                                                                 this result tra

nsmitted



                                                                 reference range

: <=4.0.



                                                                 The reference r

zhen was



                                                                 not used to int

erpret



                                                                 this result as



                                                                 normal/abnormal

.



[facility] ArlingtonIldzsmoMBOMCTMKJG0287-77-57 00:20:00





             Test Item    Value        Reference Range Interpretation Comments

 

             Basophils (test code = 0.9          See_Comment                [Aut

omated message] The



             Basophils)                                          system which ge

nerated



                                                                 this result tra

nsmitted



                                                                 reference range

: <=1.0.



                                                                 The reference r

zhen was



                                                                 not used to int

erpret



                                                                 this result as



                                                                 normal/abnormal

.



[facility] ArlingtonBpoflhsQGHAYHWJNE5396-39-47 00:20:00





             Test Item    Value        Reference Range Interpretation Comments

 

             Neutrophils # (test code = Neutrophils 6.9          1.5-8.1        

           



             #)                                                  



[facility] ArlingtonSnvpvgxTOJOLTLFPR8621-85-28 00:20:00





             Test Item    Value        Reference Range Interpretation Comments

 

             Lymphocytes # (test code = Lymphocytes 2.0          1.0-5.5        

           



             #)                                                  



[facility] ArlingtonLtsolnxTWEVZDHPUR3778-13-43 00:20:00





             Test Item    Value        Reference Range Interpretation Comments

 

             Monocytes # (test code 1.0          See_Comment                [Aut

omated message] The



             = Monocytes #)                                        system which 

generated



                                                                 this result tra

nsmitted



                                                                 reference range

: <=0.8.



                                                                 The reference r

zhen was



                                                                 not used to int

erpret



                                                                 this result as



                                                                 normal/abnormal

.



[facility] ArlingtonNifwjnoTHHTIQZWKU3757-54-10 00:20:00





             Test Item    Value        Reference Range Interpretation Comments

 

             Eosinophils # (test code 0.1          See_Comment                [A

utomated message] The



             = Eosinophils #)                                        system [facility]

h generated



                                                                 this result tra

nsmitted



                                                                 reference range

: <=0.5.



                                                                 The reference r

zhen was



                                                                 not used to int

erpret



                                                                 this result as



                                                                 normal/abnormal

.



[facility] ArlingtonYfkjkfpAHJCJBJEXL3055-09-90 00:20:00





             Test Item    Value        Reference Range Interpretation Comments

 

             Basophils # (test code 0.1          See_Comment                [Aut

omated message] The



             = Basophils #)                                        system which 

generated



                                                                 this result tra

nsmitted



                                                                 reference range

: <=0.2.



                                                                 The reference r

zhen was



                                                                 not used to int

erpret



                                                                 this result as



                                                                 normal/abnormal

.



[facility] HospitalXfczaweBHQJVVPXAV7729[facility]41-23 [facility]





             Test Item    Value        Reference Range Interpretation Comments

 

             C-REACTIVE PROTEIN (test code = 13.2                               

    



             C-REACTIVE PROTEIN)                                        



[facility] ArlingtonZsbyaapZWHTOPMWCN6864-08-72 00:20:00





             Test Item    Value        Reference Range Interpretation Comments

 

             WBC (test code = WBC) 10.1         3.7-10.4                  



[facility] ArlingtonHyfoxvvNJHOGBXUMB3453-73-86 00:20:00





             Test Item    Value        Reference Range Interpretation Comments

 

             RBC (test code = RBC) 5.14         4.70-6.10                 



[facility] ArlingtonTwgzeouSKFSZBHPIZ6719-66-00 00:20:00





             Test Item    Value        Reference Range Interpretation Comments

 

             Hgb (test code = Hgb) 15.5         14.0-18.0                 



[facility] ArlingtonEbvfaatAPZIYSPACQ6359-17-97 00:20:00





             Test Item    Value        Reference Range Interpretation Comments

 

             Hct (test code = Hct) 46.5         42.0-54.0                 



[facility] ArlingtonZeszoguJSWXUSWDOR0803-53-86 00:20:00





             Test Item    Value        Reference Range Interpretation Comments

 

             MCV (test code = MCV) 90.5         80.0-94.0                 



[facility] ArlingtonYeaisprHNBVTSWJLE0554-18-55 00:20:00





             Test Item    Value        Reference Range Interpretation Comments

 

             MCH (test code = MCH) 30.1 pg      27.0-31.0                 



[facility] ArlingtonCqheifzAXTULNVQUF0032-28-77 00:20:00





             Test Item    Value        Reference Range Interpretation Comments

 

             MCHC (test code = MCHC) 33.3         32.0-36.0                 



[facility] ArlingtonKzoqgrlDUHENITBRA2921-52-21 00:20:00





             Test Item    Value        Reference Range Interpretation Comments

 

             RDW (test code = RDW) 15.7         11.5-14.5                 



[facility] ArlingtonFvwusxmAZJQSZFZLA6676-90-93 00:20:00





             Test Item    Value        Reference Range Interpretation Comments

 

             Platelet (test code = Platelet) 302          133-450               

    



[facility] ArlingtonDofjftaQDGDZLWWON1241-94-05 00:20:00





             Test Item    Value        Reference Range Interpretation Comments

 

             MPV (test code = MPV) 8.9          7.4-10.4                  



[facility] ArlingtonCaptvwhWKDMQVCXPS1592-06-01 00:20:00





             Test Item    Value        Reference Range Interpretation Comments

 

             Sed Rate (test code = 17           See_Comment                [Auto

mated message] The



             Sed Rate)                                           system which ge

nerated this



                                                                 result transmit

huma



                                                                 reference range

: <=15. The



                                                                 reference range

 was not



                                                                 used to interpr

et this



                                                                 result as zayda

l/abnormal.



[facility] ArlingtonQkrzfyaRQKFIFBBFY9222-67-66 00:20:00





             Test Item    Value        Reference Range Interpretation Comments

 

             PT (test code = PT) 13.1 s       12.0-14.7                 



[facility] ArlingtonVqidndbVANMFRDAHR6451-95-47 00:20:00





             Test Item    Value        Reference Range Interpretation Comments

 

             INR (test code = INR) 1.00 1       0.85-1.17                 



[facility] ArlingtonUqwuxfpHIKHFVXGYG9905-26-06 00:20:00





             Test Item    Value        Reference Range Interpretation Comments

 

             PTT (test code = PTT) 31.5 s       22.9-35.8                 



[facility] ArlingtonTqlckutMGBUZLJUZP8470-24-10 00:20:00





             Test Item    Value        Reference Range Interpretation Comments

 

             Segs (test code = Segs) 68.7         45.0-75.0                 



[facility] ArlingtonDunutwuXZBBAJYXXL0059-11-61 00:20:00





             Test Item    Value        Reference Range Interpretation Comments

 

             Lymphocytes (test code = Lymphocytes) 19.6         20.0-40.0       

          



[facility] ArlingtonFqgnyveBXHRYOYYUT3033-55-49 00:20:00





             Test Item    Value        Reference Range Interpretation Comments

 

             Monocytes (test code = Monocytes) 9.4          2.0-12.0            

      



[facility] ArlingtonWonxubrCPTSDYOJIJ8808-01-31 00:20:00





             Test Item    Value        Reference Range Interpretation Comments

 

             Eosinophils (test code = 1.4          See_Comment                [A

utomated message] The



             Eosinophils)                                        system which ge

nerated



                                                                 this result tra

nsmitted



                                                                 reference range

: <=4.0.



                                                                 The reference r

zhen was



                                                                 not used to int

erpret



                                                                 this result as



                                                                 normal/abnormal

.



[facility] ArlingtonIgmdjqkUSLAHUNVQK1602-95-60 00:20:00





             Test Item    Value        Reference Range Interpretation Comments

 

             Basophils (test code = 0.9          See_Comment                [Aut

omated message] The



             Basophils)                                          system which ge

nerated



                                                                 this result tra

nsmitted



                                                                 reference range

: <=1.0.



                                                                 The reference r

zhen was



                                                                 not used to int

erpret



                                                                 this result as



                                                                 normal/abnormal

.



[facility] ArlingtonKwoznmaMUZGHVTMVQ0223-23-76 00:20:00





             Test Item    Value        Reference Range Interpretation Comments

 

             Neutrophils # (test code = Neutrophils 6.9          1.5-8.1        

           



             #)                                                  



[facility] ArlingtonMqezcwkKNYSHLQKUG1759-68-90 00:20:00





             Test Item    Value        Reference Range Interpretation Comments

 

             Lymphocytes # (test code = Lymphocytes 2.0          1.0-5.5        

           



             #)                                                  



[facility] ArlingtonIutbkkiVJUKOSNEOM1597-99-99 00:20:00





             Test Item    Value        Reference Range Interpretation Comments

 

             Monocytes # (test code 1.0          See_Comment                [Aut

omated message] The



             = Monocytes #)                                        system which 

generated



                                                                 this result tra

nsmitted



                                                                 reference range

: <=0.8.



                                                                 The reference r

zhen was



                                                                 not used to int

erpret



                                                                 this result as



                                                                 normal/abnormal

.



[facility] ArlingtonDyplybnPPPJVLEMAW2769-47-49 00:20:00





             Test Item    Value        Reference Range Interpretation Comments

 

             Eosinophils # (test code 0.1          See_Comment                [A

utomated message] The



             = Eosinophils #)                                        system ic

h generated



                                                                 this result tra

nsmitted



                                                                 reference range

: <=0.5.



                                                                 The reference r

zhen was



                                                                 not used to int

erpret



                                                                 this result as



                                                                 normal/abnormal

.



[facility] ArlingtonEddeplxTTGJFYYNVP6195-38-13 00:20:00





             Test Item    Value        Reference Range Interpretation Comments

 

             Basophils # (test code 0.1          See_Comment                [Aut

omated message] The



             = Basophils #)                                        system which 

generated



                                                                 this result tra

nsmitted



                                                                 reference range

: <=0.2.



                                                                 The reference r

zhen was



                                                                 not used to int

erpret



                                                                 this result as



                                                                 normal/abnormal

.



[facility] HospitalDrwhznaQXOWKTMKAE4742-50-90 [facility]:00





             Test Item    Value        Reference Range Interpretation Comments

 

             C-REACTIVE PROTEIN (test code = 13.2                               

    



             C-REACTIVE PROTEIN)                                        



[facility] WITH ZDNV3797-42-00 04:47:32





             Test Item    Value        Reference Range Interpretation Comments

 

             WBC (test code =              See_Comment  H             [Automated



             4890-2)                                             message] The sy

stem



                                                                 which generated



                                                                 this result



                                                                 transmitted



                                                                 reference range

:



                                                                 4.20 - 10.70



                                                                 10*3/?L. The



                                                                 reference range

 was



                                                                 not used to



                                                                 interpret this



                                                                 result as



                                                                 normal/abnormal

.

 

             RBC (test code =              See_Comment                [Automated



             339-8)                                              message] The sy

stem



                                                                 which generated



                                                                 this result



                                                                 transmitted



                                                                 reference range

:



                                                                 4.26 - 5.52



                                                                 10*6/?L. The



                                                                 reference range

 was



                                                                 not used to



                                                                 interpret this



                                                                 result as



                                                                 normal/abnormal

.

 

             HGB (test code = 16.1 g/dL    12.2-16.4                 



             718-7)                                              

 

             HCT (test code = 47.2 %       38.4-49.3                 



             4544-3)                                             

 

             MCV (test code = 86.1 fL      81.7-95.6                 



             787-2)                                              

 

             MCH (test code = 29.4 pg      26.1-32.7                 



             785-6)                                              

 

             MCHC (test code = 34.1 g/dL    31.2-35.0                 



             786-4)                                              

 

             RDW-SD (test code = 45.7 fL      38.5-51.6                 



             57787-7)                                            

 

             RDW-CV (test code = 14.6 %       12.1-15.4                 



             788-0)                                              

 

             PLT (test code =              See_Comment                [Automated



             777-3)                                              message] The sy

stem



                                                                 which generated



                                                                 this result



                                                                 transmitted



                                                                 reference range

:



                                                                 150 - 328 10*3/

?L.



                                                                 The reference r

zhen



                                                                 was not used to



                                                                 interpret this



                                                                 result as



                                                                 normal/abnormal

.

 

             MPV (test code = 11.0 fL      9.8-13.0                  



             72587-5)                                            

 

             NRBC/100 WBC (test              See_Comment                [Automat

ed



             code = 3321132579)                                        message] 

The system



                                                                 which generated



                                                                 this result



                                                                 transmitted



                                                                 reference range

:



                                                                 0.0 - 10.0 /100



                                                                 WBCs. The refer

ence



                                                                 range was not u

sed



                                                                 to interpret th

is



                                                                 result as



                                                                 normal/abnormal

.

 

             NRBC x10^3 (test code <0.01        See_Comment                [Auto

mated



             = [id number])                                        message] The s

ystem



                                                                 which generated



                                                                 this result



                                                                 transmitted



                                                                 reference range

:



                                                                 10*3/?L. The



                                                                 reference range

 was



                                                                 not used to



                                                                 interpret this



                                                                 result as



                                                                 normal/abnormal

.

 

             GRAN MAT (NEUT) % 72.2 %                                 



             (test code = 770-8)                                        

 

             IMM GRAN % (test code 0.60 %                                 



             = 8134476786)                                        

 

             LYMPH % (test code = 17.7 %                                 



             736-9)                                              

 

             MONO % (test code = 7.4 %                                  



             5905-5)                                             

 

             EOS % (test code = 1.8 %                                  



             713-8)                                              

 

             BASO % (test code = 0.3 %                                  



             706-2)                                              

 

             GRAN MAT x10^3(ANC) 9.09 10*3/uL 1.99-6.95    H            



             (test code =                                        



             1157850311)                                         

 

             IMM GRAN x10^3 (test 0.07 10*3/uL 0.00-0.06    H            



             code = 9804434106)                                        

 

             LYMPH x10^3 (test code 2.22 10*3/uL 1.09-3.23                 



             = 731-0)                                            

 

             MONO x10^3 (test code 0.93 10*3/uL 0.36-1.02                 



             = 742-7)                                            

 

             EOS x10^3 (test code = 0.22 10*3/uL 0.06-0.53                 



             711-2)                                              

 

             BASO x10^3 (test code 0.04 10*3/uL 0.01-0.09                 



             = 704-7)                                            

 

             Lab Interpretation Abnormal                               



             (test code = 68724-5)                                        



[facility]. METABOLIC PANEL ([id number])2022 
04:36:41





             Test Item    Value        Reference Range Interpretation Comments

 

             NA (test code = 138 mmol/L   135-145                   



             4275822229)                                         

 

             K (test code = 4.6 mmol/L   3.5-5.0                   



             5726321313)                                         

 

             CL (test code = 105 mmol/L                       



             0244586112)                                         

 

             CO2 TOTAL (test code = 21 mmol/L    23-31        L            



             6022449964)                                         

 

             AGAP (test code =              2-16                      



             4452937518)                                         

 

             BUN (test code = 13 mg/dL     7-23                      



             1796523481)                                         

 

             GLUCOSE (test code = 167 mg/dL           H            



             2224448649)                                         

 

             CREATININE (test code = 0.48 mg/dL   0.60-1.25    L            



             6500979431)                                         

 

             TOTAL BILI (test code = 0.8 mg/dL    0.1-1.1                   



             4956512505)                                         

 

             CALCIUM (test code = 9.3 mg/dL    8.6-10.6                  



             7523574601)                                         

 

             T PROTEIN (test code = 7.6 g/dL     6.3-8.2                   



             3520977716)                                         

 

             ALBUMIN (test code = 4.2 g/dL     3.5-5.0                   



             5059606416)                                         

 

             ALK PHOS (test code = 98 U/L                           



             1880506221)                                         

 

             ALTv (test code = 71 U/L       5-50         H            



             1742-6)                                             

 

             AST(SGOT) (test code = 36 U/L       13-40                     



             9335110867)                                         

 

             eGFR (test code =              mL/min/1.73m2              



             0685150535)                                         

 

             MAL (test code = MAL) Association of                           



                          Glomerular Filtration                           



                          Rate (GFR) and Staging                           



                          of Kidney Disease*                           



                          +---------------------                           



                          --+-------------------                           



                          --+-------------------                           



                          ------+| GFR                           



                          (mL/min/1.73 m2) ?|                           



                          With Kidney Damage ?|                           



                          ?Without Kidney                           



                          Damage+---------------                           



                          --------+-------------                           



                          --------+-------------                           



                          ------------+| ?>90 ?                           



                          ? ? ? ? ? ? ? ?|                           



                          ?Stage one ? ? ? ? ?|                           



                          ? Normal ? ? ? ? ? ? ?                           



                          ?+--------------------                           



                          ---+------------------                           



                          ---+------------------                           



                          -------+| ?60-89 ? ? ?                           



                          ? ? ? ? ?| ?Stage two                           



                          ? ? ? ? ?| ? Decreased                           



                          GFR ? ? ? ?                            



                          +---------------------                           



                          --+-------------------                           



                          --+-------------------                           



                          ------+| ?30-59 ? ? ?                           



                          ? ? ? ? ?| ?Stage                           



                          three ? ? ? ?| ? Stage                           



                          three ? ? ? ? ?                           



                          +---------------------                           



                          --+-------------------                           



                          --+-------------------                           



                          ------+| ?15-29 ? ? ?                           



                          ? ? ? ? ?| ?Stage four                           



                          ? ? ? ? | ? Stage four                           



                          ? ? ? ? ?                              



                          ?+--------------------                           



                          ---+------------------                           



                          ---+------------------                           



                          -------+| ?<15 (or                           



                          dialysis) ? ?| ?Stage                           



                          five ? ? ? ? | ? Stage                           



                          five ? ? ? ? ?                           



                          ?+--------------------                           



                          ---+------------------                           



                          ---+------------------                           



                          -------+ *Each stage                           



                          assumes the associated                           



                          GFR level has been in                           



                          effect for at least                           



                          three months. ?Stages                           



                          1 to 5, with or                           



                          without kidney                           



                          disease, indicate                           



                          chronic kidney                           



                          disease. Notes:                           



                          Determination of                           



                          stages one and two                           



                          (with eGFR                             



                          >59mL/min/1.73 m2)                           



                          requires estimation of                           



                          kidney damage for at                           



                          least three months as                           



                          defined by structural                           



                          or functional                           



                          abnormalities of the                           



                          kidney, manifested by                           



                          either:Pathological                           



                          abnormalities or                           



                          Markers of kidney                           



                          damage (including                           



                          abnormalities in the                           



                          composition of the                           



                          blood or urine or                           



                          abnormalities in                           



                          imaging tests).                           

 

             Lab Interpretation Abnormal                               



             (test code = 64115-9)                                        



[facility]





             Test Item    Value        Reference Range Interpretation Comments

 

             MAGNESIUM (test code = 6030429313) 1.6 mg/dL    1.7-2.4      L     

       

 

             Lab Interpretation (test code = Abnormal                           

    



             56841-6)                                            



[facility] [place] [place] [id number] 07:52:00





             Test Item    Value        Reference Range Interpretation Comments

 

             ABO/Rh (test code = ABO/Rh) AB POS                                 



[facility] [id number] 07:52:00





             Test Item    Value        Reference Range Interpretation Comments

 

             Antibody Scrn (test Negative (3/28/22 2:52                         

  



             code = Antibody Scrn) AM)                                    



[facility] [id number] 07:52:00





             Test Item    Value        Reference Range Interpretation Comments

 

             Glucose Lvl (test code = Glucose Lvl) 291          70-99           

          



[id number]





             Test Item    Value        Reference Range Interpretation Comments

 

             BUN (test code = BUN) 16           7-                      



[facility] [id number] 07:52:00





             Test Item    Value        Reference Range Interpretation Comments

 

             Creatinine Lvl (test code = Creatinine 0.83         0.50-1.40      

           



             Lvl)                                                



[facility] [id number] 07:52:00





             Test Item    Value        Reference Range Interpretation Comments

 

             Sodium Lvl (test code = Sodium Lvl) 138          135-145           

        



[place] 422952-03-28 07:52:00





             Test Item    Value        Reference Range Interpretation Comments

 

             Potassium Lvl (test code = Potassium 4.7          3.5-5.1          

         



             Lvl)                                                



[place] 422952-03-28 07:52:00





             Test Item    Value        Reference Range Interpretation Comments

 

             Chloride Lvl (test code = Chloride Lvl) 113                  

            



[place] 422952-03-28 07:52:00





             Test Item    Value        Reference Range Interpretation Comments

 

             CO2 (test code = CO2) 18           24-32                     



[place] 422952-03-28 07:52:00





             Test Item    Value        Reference Range Interpretation Comments

 

             AGAP (test code = AGAP) 11.7         10.0-20.0                 



[place] 422952-03-28 07:52:00





             Test Item    Value        Reference Range Interpretation Comments

 

             Calcium Lvl (test code = Calcium Lvl) 9.4          8.5-10.5        

          



[place] 422952-03-28 07:52:00





             Test Item    Value        Reference Range Interpretation Comments

 

             eGFR (test code = eGFR) 113                                    



[place] 731572-03-28 07:52:00





             Test Item    Value        Reference Range Interpretation Comments

 

             WBC (test code = WBC) 5.5          3.7-10.4                  



[place] 731572-03-28 07:52:00





             Test Item    Value        Reference Range Interpretation Comments

 

             RBC (test code = RBC) 5.53         4.70-6.10                 



[place] 731572-03-28 07:52:00





             Test Item    Value        Reference Range Interpretation Comments

 

             Hgb (test code = Hgb) 16.3         14.0-18.0                 



[place] 78976-03-28 07:52:00





             Test Item    Value        Reference Range Interpretation Comments

 

             Hct (test code = Hct) 50.5         42.0-54.0                 



[place] 78976-03-28 07:52:00





             Test Item    Value        Reference Range Interpretation Comments

 

             MCV (test code = MCV) 91.3         80.0-94.0                 



[place] 731572-03-28 07:52:00





             Test Item    Value        Reference Range Interpretation Comments

 

             MCH (test code = MCH) 29.5 pg      27.0-31.0                 



[place] 731572-03-28 07:52:00





             Test Item    Value        Reference Range Interpretation Comments

 

             MCHC (test code = MCHC) 32.3         32.0-36.0                 



[id number]





             Test Item    Value        Reference Range Interpretation Comments

 

             RDW (test code = RDW) 15.4         11.5-14.5                 



[facility]wtYGKZV[facility]IPCO0573[facility]75-31 07:52:00





             Test Item    Value        Reference Range Interpretation Comments

 

             Platelet (test code = Platelet) 210          133-450               

    



[facility]bejxuqRXFYGODYAB1423-36-42 07:52:00





             Test Item    Value        Reference Range Interpretation Comments

 

             MPV (test code = MPV) 9.3          7.4-10.4                  



[facility] [employer] [id number] 07:52:00





             Test Item    Value        Reference Range Interpretation Comments

 

             ABO/Rh (test code = ABO/Rh) AB POS                                 



[facility] [employer] [id number] 07:52:00





             Test Item    Value        Reference Range Interpretation Comments

 

             Antibody Scrn (test Negative (3/28/22 2:52                         

  



             code = Antibody Scrn) AM)                                    



[facility] [id number] 07:52:00





             Test Item    Value        Reference Range Interpretation Comments

 

             Glucose Lvl (test code = Glucose Lvl) 291          70-99           

          



[facility] [employer] [id number] 07:52:00





             Test Item    Value        Reference Range Interpretation Comments

 

             BUN (test code = BUN) 16           7-22                      



[facility] [employer] [id number] 07:52:00





             Test Item    Value        Reference Range Interpretation Comments

 

             Creatinine Lvl (test code = Creatinine 0.83         0.50-1.40      

           



             Lvl)                                                



[facility] [id number] 07:52:00





             Test Item    Value        Reference Range Interpretation Comments

 

             Sodium Lvl (test code = Sodium Lvl) 138          135-145           

        



[facility] [id number] 07:52:00





             Test Item    Value        Reference Range Interpretation Comments

 

             Potassium Lvl (test code = Potassium 4.7          3.5-5.1          

         



             Lvl)                                                



[facility] [id number] 07:52:00





             Test Item    Value        Reference Range Interpretation Comments

 

             Chloride Lvl (test code = Chloride Lvl) 113                  

            



[facility] [id number] 07:52:00





             Test Item    Value        Reference Range Interpretation Comments

 

             CO2 (test code = CO2) 18           24-32                     



[facility]2022-03-28 07:52:00





             Test Item    Value        Reference Range Interpretation Comments

 

             AGAP (test code = AGAP) 11.7         10.0-20.0                 



[facility]2022-03-28 07:52:00





             Test Item    Value        Reference Range Interpretation Comments

 

             Calcium Lvl (test code = Calcium Lvl) 9.4          8.5-10.5        

          



[facility]2022-03-28 07:52:00





             Test Item    Value        Reference Range Interpretation Comments

 

             eGFR (test code = eGFR) 113                                    



[facility]





             Test Item    Value        Reference Range Interpretation Comments

 

             WBC (test code = WBC) 5.5          3.7-10.4                  



[place]1572-03-28 07:52:00





             Test Item    Value        Reference Range Interpretation Comments

 

             RBC (test code = RBC) 5.53         4.70-6.10                 



[place] 731572-03-28 07:52:00





             Test Item    Value        Reference Range Interpretation Comments

 

             Hgb (test code = Hgb) 16.3         14.0-18.0                 



[place] 731572-03-28 07:52:00





             Test Item    Value        Reference Range Interpretation Comments

 

             Hct (test code = Hct) 50.5         42.0-54.0                 



[place] 731572-03-28 07:52:00





             Test Item    Value        Reference Range Interpretation Comments

 

             MCV (test code = MCV) 91.3         80.0-94.0                 



[place] 731572-03-28 07:52:00





             Test Item    Value        Reference Range Interpretation Comments

 

             MCH (test code = MCH) 29.5 pg      27.0-31.0                 



[place] 731572-03-28 07:52:00





             Test Item    Value        Reference Range Interpretation Comments

 

             MCHC (test code = MCHC) 32.3         32.0-36.0                 



[place] 731572-03-28 07:52:00





             Test Item    Value        Reference Range Interpretation Comments

 

             RDW (test code = RDW) 15.4         11.5-14.5                 



[place] 731572-03-28 07:52:00





             Test Item    Value        Reference Range Interpretation Comments

 

             Platelet (test code = Platelet) 210          133-450               

    



[place] 731572-03-28 07:52:00





             Test Item    Value        Reference Range Interpretation Comments

 

             MPV (test code = MPV) 9.3          7.4-10.4                  



[place] 422952-03-27 10:12:00





             Test Item    Value        Reference Range Interpretation Comments

 

             Glucose Lvl (test code = Glucose Lvl) 115          70-99           

          



[place] 422952-03-27 10:12:00





             Test Item    Value        Reference Range Interpretation Comments

 

             BUN (test code = BUN) 18           7-22                      



[place] 422952-03-27 10:12:00





             Test Item    Value        Reference Range Interpretation Comments

 

             Creatinine Lvl (test code = Creatinine 0.78         0.50-1.40      

           



             Lvl)                                                



[place] 422952-03-27 10:12:00





             Test Item    Value        Reference Range Interpretation Comments

 

             Sodium Lvl (test code = Sodium Lvl) 138          135-145           

        



[place] 422952-03-27 10:12:00





             Test Item    Value        Reference Range Interpretation Comments

 

             Potassium Lvl (test code = Potassium 4.6          3.5-5.1          

         



             Lvl)                                                



[place] 422952-03-27 10:12:00





             Test Item    Value        Reference Range Interpretation Comments

 

             Chloride Lvl (test code = Chloride Lvl) 111                  

            



[place] 422952-03-27 10:12:00





             Test Item    Value        Reference Range Interpretation Comments

 

             CO2 (test code = CO2) 21           24-32                     



[place] 422952-03-27 10:12:00





             Test Item    Value        Reference Range Interpretation Comments

 

             AGAP (test code = AGAP) 10.6         10.0-20.0                 



[place] 422952-03-27 10:12:00





             Test Item    Value        Reference Range Interpretation Comments

 

             Calcium Lvl (test code = Calcium Lvl) 8.6          8.5-10.5        

          



[place] 422952-03-27 10:12:00





             Test Item    Value        Reference Range Interpretation Comments

 

             eGFR (test code = eGFR) 116                                    



[place] 731572-03-27 10:12:00





             Test Item    Value        Reference Range Interpretation Comments

 

             WBC (test code = WBC) 8.2          3.7-10.4                  



[place] 731572-03-27 10:12:00





             Test Item    Value        Reference Range Interpretation Comments

 

             RBC (test code = RBC) 5.14         4.70-6.10                 



[address]03-27 10:12:00





             Test Item    Value        Reference Range Interpretation Comments

 

             Hgb (test code = Hgb) 15.5         14.0-18.0                 



[place] [address]-03-27 10:12:00





             Test Item    Value        Reference Range Interpretation Comments

 

             Hct (test code = Hct) 46.0         42.0-54.0                 



[place] [address]-03-27 10:12:00





             Test Item    Value        Reference Range Interpretation Comments

 

             MCV (test code = MCV) 89.5         80.0-94.0                 



[place] [address]-03-27 10:12:00





             Test Item    Value        Reference Range Interpretation Comments

 

             MCH (test code = MCH) 30.2 pg      27.0-31.0                 



[place] [address]-03-27 10:12:00





             Test Item    Value        Reference Range Interpretation Comments

 

             MCHC (test code = MCHC) 33.8         32.0-36.0                 



[place] 731572-03-27 10:12:00





             Test Item    Value        Reference Range Interpretation Comments

 

             RDW (test code = RDW) 15.3         11.5-14.5                 



[place] [address]-03-27 10:12:00





             Test Item    Value        Reference Range Interpretation Comments

 

             Platelet (test code = Platelet) 358          133-450               

    



[place] 731572-03-27 10:12:00





             Test Item    Value        Reference Range Interpretation Comments

 

             MPV (test code = MPV) 8.3          7.4-10.4                  



[facility]2022-03-27 10:12:00





             Test Item    Value        Reference Range Interpretation Comments

 

             Glucose Lvl (test code = Glucose Lvl) 115          70-99           

          



[facility]2022-03-27 10:12:00





             Test Item    Value        Reference Range Interpretation Comments

 

             BUN (test code = BUN) 18           7-22                      



[place] 422952-03-27 10:12:00





             Test Item    Value        Reference Range Interpretation Comments

 

             Creatinine Lvl (test code = Creatinine 0.78         0.50-1.40      

           



             Lvl)                                                



[facility]2022-03-27 10:12:00





             Test Item    Value        Reference Range Interpretation Comments

 

             Sodium Lvl (test code = Sodium Lvl) 138          135-145           

        



[place] 422952-03-27 10:12:00





             Test Item    Value        Reference Range Interpretation Comments

 

             Potassium Lvl (test code = Potassium 4.6          3.5-5.1          

         



             Lvl)                                                



[place] 422952-03-27 10:12:00





             Test Item    Value        Reference Range Interpretation Comments

 

             Chloride Lvl (test code = Chloride Lvl) 111                  

            



[place] 422952-03-27 10:12:00





             Test Item    Value        Reference Range Interpretation Comments

 

             CO2 (test code = CO2) 21           24-32                     



[place] 422952-03-27 10:12:00





             Test Item    Value        Reference Range Interpretation Comments

 

             AGAP (test code = AGAP) 10.6         10.0-20.0                 



[place] 422952-03-27 10:12:00





             Test Item    Value        Reference Range Interpretation Comments

 

             Calcium Lvl (test code = Calcium Lvl) 8.6          8.5-10.5        

          



[place] 422952-03-27 10:12:00





             Test Item    Value        Reference Range Interpretation Comments

 

             eGFR (test code = eGFR) 116                                    



[place] 731572-03-27 10:12:00





             Test Item    Value        Reference Range Interpretation Comments

 

             WBC (test code = WBC) 8.2          3.7-10.4                  



[place] [address]-03-27 10:12:00





             Test Item    Value        Reference Range Interpretation Comments

 

             RBC (test code = RBC) 5.14         4.70-6.10                 



[place] 731572-03-27 10:12:00





             Test Item    Value        Reference Range Interpretation Comments

 

             Hgb (test code = Hgb) 15.5         14.0-18.0                 



[place] [address]-03-27 10:12:00





             Test Item    Value        Reference Range Interpretation Comments

 

             Hct (test code = Hct) 46.0         42.0-54.0                 



[place] [address]-03-27 10:12:00





             Test Item    Value        Reference Range Interpretation Comments

 

             MCV (test code = MCV) 89.5         80.0-94.0                 



[place] [address]-03-27 10:12:00





             Test Item    Value        Reference Range Interpretation Comments

 

             MCH (test code = MCH) 30.2 pg      27.0-31.0                 



[place] [address]-03-27 10:12:00





             Test Item    Value        Reference Range Interpretation Comments

 

             MCHC (test code = MCHC) 33.8         32.0-36.0                 



[place] 731572-03-27 10:12:00





             Test Item    Value        Reference Range Interpretation Comments

 

             RDW (test code = RDW) 15.3         11.5-14.5                 



[place] 731572-03-27 10:12:00





             Test Item    Value        Reference Range Interpretation Comments

 

             Platelet (test code = Platelet) 358          133-450               

    



[place] 731572-03-27 10:12:00





             Test Item    Value        Reference Range Interpretation Comments

 

             MPV (test code = MPV) 8.3          7.4-10.4                  



[place] 422952-03-27 06:53:00





             Test Item    Value        Reference Range Interpretation Comments

 

             Glucose Lvl (test code = Glucose Lvl) 167          70-99           

          



[place] 422952-03-27 06:53:00





             Test Item    Value        Reference Range Interpretation Comments

 

             BUN (test code = BUN) 16           7-22                      



[place] 422952-03-27 06:53:00





             Test Item    Value        Reference Range Interpretation Comments

 

             Creatinine Lvl (test code = Creatinine 0.99         0.50-1.40      

           



             Lvl)                                                



[place] 422952-03-27 06:53:00





             Test Item    Value        Reference Range Interpretation Comments

 

             Sodium Lvl (test code = Sodium Lvl) 141          135-145           

        



[place] 422952-03-27 06:53:00





             Test Item    Value        Reference Range Interpretation Comments

 

             Potassium Lvl (test code = Potassium 4.5          3.5-5.1          

         



             Lvl)                                                



[place] 422952-03-27 06:53:00





             Test Item    Value        Reference Range Interpretation Comments

 

             Chloride Lvl (test code = Chloride Lvl) 111                  

            



[place] 422952-03-27 06:53:00





             Test Item    Value        Reference Range Interpretation Comments

 

             CO2 (test code = CO2) 22           24-32                     



[place] 422952-03-27 06:53:00





             Test Item    Value        Reference Range Interpretation Comments

 

             AGAP (test code = AGAP) 12.5         10.0-20.0                 



[place] 422952-03-27 06:53:00





             Test Item    Value        Reference Range Interpretation Comments

 

             Calcium Lvl (test code = Calcium Lvl) 8.6          8.5-10.5        

          



[place] 422952-03-27 06:53:00





             Test Item    Value        Reference Range Interpretation Comments

 

             eGFR (test code = eGFR) 98                                     



[place] 422952-03-27 06:53:00





             Test Item    Value        Reference Range Interpretation Comments

 

             Glucose Lvl (test code = Glucose Lvl) 167          70-99           

          



[place] 422952-03-27 06:53:00





             Test Item    Value        Reference Range Interpretation Comments

 

             BUN (test code = BUN) 16           7-22                      



[place] 422952-03-27 06:53:00





             Test Item    Value        Reference Range Interpretation Comments

 

             Creatinine Lvl (test code = Creatinine 0.99         0.50-1.40      

           



             Lvl)                                                



[facility]2022-03-27 06:53:00





             Test Item    Value        Reference Range Interpretation Comments

 

             Sodium Lvl (test code = Sodium Lvl) 141          135-145           

        



[place] 422952-03-27 06:53:00





             Test Item    Value        Reference Range Interpretation Comments

 

             Potassium Lvl (test code = Potassium 4.5          3.5-5.1          

         



             Lvl)                                                



[place] 422952-03-27 06:53:00





             Test Item    Value        Reference Range Interpretation Comments

 

             Chloride Lvl (test code = Chloride Lvl) 111                  

            



[place] 422952-03-27 06:53:00





             Test Item    Value        Reference Range Interpretation Comments

 

             CO2 (test code = CO2) 22           24-32                     



[place] 422952-03-27 06:53:00





             Test Item    Value        Reference Range Interpretation Comments

 

             AGAP (test code = AGAP) 12.5         10.0-20.0                 



[place] 422952-03-27 06:53:00





             Test Item    Value        Reference Range Interpretation Comments

 

             Calcium Lvl (test code = Calcium Lvl) 8.6          8.5-10.5        

          



[facility]2022-03-27 06:53:00





             Test Item    Value        Reference Range Interpretation Comments

 

             eGFR (test code = eGFR) 98                                     



[facility]SUSC:PT:ISOLATE:ORDQN:[id number] 22:59:00





             Test Item    Value        Reference Range Interpretation Comments

 

             Culture: Urine (test >100,000 CFU/mL Proteus                       

    



             code = Culture: mirabilis >100,000 CFU/mL                          

 



             Urine)       Providencia stuartii                           



[facility]:ISOLATE:ORDQN:[id number] 22:59:00





             Test Item    Value        Reference Range Interpretation Comments

 

             Providencia stuartii Providencia stuartii                          

 



             (test code = Providencia                                        



             stuartii)                                           



[facility]ITR[facility]IN:SUSC:PT:ISOLATE:ORDQN:[id number] 22:59:00





             Test Item    Value        Reference Range Interpretation Comments

 

             Proteus mirabilis (test Proteus mirabilis                          

 



             code = Proteus mirabilis)                                        



[facility] [id number] 22:59:00





             Test Item    Value        Reference Range Interpretation Comments

 

             Culture: Urine (test Holding For Better                           



             code = Culture: Urine) Growth                                 



[facility]SUSC:PT:ISOLATE:ORDQN:[id number] 22:59:00





             Test Item    Value        Reference Range Interpretation Comments

 

             Culture: Urine (test >100,000 CFU/mL Proteus                       

    



             code = Culture: mirabilis >100,000 CFU/mL                          

 



             Urine)       Providencia stuartii                           



[facility]annN[facility]OFURANT[facility]IN:SUSC:PT:ISOLATE:ORDQN:[id number] 22:59:00





             Test Item    Value        Reference Range Interpretation Comments

 

             Providencia stuartii Providencia stuartii                          

 



             (test code = Providencia                                        



             stuartii)                                           



[facility]annNITROFURANTOIN:SUSC:PT:ISOLATE:ORDQN:[id number] 22:59:00





             Test Item    Value        Reference Range Interpretation Comments

 

             Proteus mirabilis (test Proteus mirabilis                          

 



             code = Proteus mirabilis)                                        



[facility] [id number] 22:59:00





             Test Item    Value        Reference Range Interpretation Comments

 

             Culture: Urine (test Holding For Better                           



             code = Culture: Urine) Growth                                 



[facility] [employer] [id number] 21:03:00





             Test Item    Value        Reference Range Interpretation Comments

 

             Glucose Lvl (test code = Glucose Lvl) 126          70-99           

          



[facility] [id number] 21:03:00





             Test Item    Value        Reference Range Interpretation Comments

 

             BUN (test code = BUN) 16           7-22                      



[facility] [id number] 21:03:00





             Test Item    Value        Reference Range Interpretation Comments

 

             Creatinine Lvl (test code = Creatinine 0.76         0.50-1.40      

           



             Lvl)                                                



[facility] [id number] 21:03:00





             Test Item    Value        Reference Range Interpretation Comments

 

             Sodium Lvl (test code = Sodium Lvl) 140          135-145           

        



[place] 422952-03-26 21:03:00





             Test Item    Value        Reference Range Interpretation Comments

 

             Potassium Lvl (test code = Potassium 3.3          3.5-5.1          

         



             Lvl)                                                



[place] 422952-03-26 21:03:00





             Test Item    Value        Reference Range Interpretation Comments

 

             Chloride Lvl (test code = Chloride Lvl) 111                  

            



[place] 422952-03-26 21:03:00





             Test Item    Value        Reference Range Interpretation Comments

 

             CO2 (test code = CO2) 22           24-32                     



[place] 422952-03-26 21:03:00





             Test Item    Value        Reference Range Interpretation Comments

 

             Calcium Lvl (test code = Calcium Lvl) 8.5          8.5-10.5        

          



[place] 422952-03-26 21:03:00





             Test Item    Value        Reference Range Interpretation Comments

 

             AGAP (test code = AGAP) 10.3         10.0-20.0                 



[place] 422952-03-26 21:03:00





             Test Item    Value        Reference Range Interpretation Comments

 

             eGFR (test code = eGFR) 117                                    



[place] 422952-03-26 21:03:00





             Test Item    Value        Reference Range Interpretation Comments

 

             Lactic Acid Lvl (test code = Lactic 1.1          0.5-2.2           

        



             Acid Lvl)                                           



[place] 731572-03-26 21:03:00





             Test Item    Value        Reference Range Interpretation Comments

 

             Segs (test code = Segs) 68.9         45.0-75.0                 



[place] [address]-03-26 21:03:00





             Test Item    Value        Reference Range Interpretation Comments

 

             Lymphocytes (test code = Lymphocytes) 20.4         20.0-40.0       

          



[place] [address]-03-26 21:03:00





             Test Item    Value        Reference Range Interpretation Comments

 

             Monocytes (test code = Monocytes) 8.2          2.0-12.0            

      



[place] [address]-03-26 21:03:00





             Test Item    Value        Reference Range Interpretation Comments

 

             Eosinophils (test code = 2.2          See_Comment                [A

utomated message] The



             Eosinophils)                                        system which ge

nerated



                                                                 this result tra

nsmitted



                                                                 reference range

: <=4.0.



                                                                 The reference r

zhen was



                                                                 not used to int

erpret



                                                                 this result as



                                                                 normal/abnormal

.



[id number]03:00





             Test Item    Value        Reference Range Interpretation Comments

 

             Basophils (test code = 0.3          See_Comment                [Aut

omated message] The



             Basophils)                                          system which ge

nerated



                                                                 this result tra

nsmitted



                                                                 reference range

: <=1.0.



                                                                 The reference r

zhen was



                                                                 not used to int

erpret



                                                                 this result as



                                                                 normal/abnormal

.



[facility] [id number] 21:03:00





             Test Item    Value        Reference Range Interpretation Comments

 

             Neutrophils # (test code = Neutrophils 5.5          1.5-8.1        

           



             #)                                                  



[facility] [id number] 21:03:00





             Test Item    Value        Reference Range Interpretation Comments

 

             Lymphocytes # (test code = Lymphocytes 1.6          1.0-5.5        

           



             #)                                                  



[facility]YRG[facility]GCH2730[facility]60 [facility]00





             Test Item    Value        Reference Range Interpretation Comments

 

             Monocytes # (test code 0.7          See_Comment                [Aut

omated message] The



             = Monocytes #)                                        system which 

generated



                                                                 this result tra

nsmitted



                                                                 reference range

: <=0.8.



                                                                 The reference r

zhen was



                                                                 not used to int

erpret



                                                                 this result as



                                                                 normal/abnormal

.



[facility]JmzhnidHMUHEUGWYY5528-84-32 21:03:00





             Test Item    Value        Reference Range Interpretation Comments

 

             Eosinophils # (test code 0.2          See_Comment                [A

utomated message] The



             = Eosinophils #)                                        system whic

h generated



                                                                 this result tra

nsmitted



                                                                 reference range

: <=0.5.



                                                                 The reference r

zhen was



                                                                 not used to int

erpret



                                                                 this result as



                                                                 normal/abnormal

.



[facility] ArlingtonXblufilFPDPAWBEDA1476-02-64 21:03:00





             Test Item    Value        Reference Range Interpretation Comments

 

             WBC (test code = WBC) 8.0          3.7-10.4                  



[facility] ArlingtonJklviflTAACRJGWCS0609-22-55 21:03:00





             Test Item    Value        Reference Range Interpretation Comments

 

             RBC (test code = RBC) 5.37         4.70-6.10                 



[place]1572-03-26 21:03:00





             Test Item    Value        Reference Range Interpretation Comments

 

             Hgb (test code = Hgb) 15.9         14.0-18.0                 



[facility] ArlingtonYvetrcrISJFBLGHND1669-35-95 21:03:00





             Test Item    Value        Reference Range Interpretation Comments

 

             Hct (test code = Hct) 47.3         42.0-54.0                 



[facility] ArlingtonJuqezkzATRTYBGDHN0831-03-20 21:03:00





             Test Item    Value        Reference Range Interpretation Comments

 

             MCV (test code = MCV) 88.1         80.0-94.0                 



[facility] ArlingtonMvixbaiCFGEWIGTJB5960-25-16 21:03:00





             Test Item    Value        Reference Range Interpretation Comments

 

             MCH (test code = MCH) 29.6 pg      27.0-31.0                 



[facility] ArlingtonAwuvueyDOFJHCGIYQ4837-68-06 21:03:00





             Test Item    Value        Reference Range Interpretation Comments

 

             MCHC (test code = MCHC) 33.6         32.0-36.0                 



[facility] ArlingtonXksmfcyWSCUUJVRBN0025-40-94 21:03:00





             Test Item    Value        Reference Range Interpretation Comments

 

             RDW (test code = RDW) 15.3         11.5-14.5                 



[facility] ArlingtonYayeeyrPVYAZIWRIR2687-17-74 21:03:00





             Test Item    Value        Reference Range Interpretation Comments

 

             Platelet (test code = Platelet) 370          133-450               

    



[facility] ArlingtonRrzicwfXYYCEXPFKU2889-31-24 21:03:00





             Test Item    Value        Reference Range Interpretation Comments

 

             MPV (test code = MPV) 8.1          7.4-10.4                  



[facility]2022-03-26 21:03:00





             Test Item    Value        Reference Range Interpretation Comments

 

             UA Comment 1 (test Suboptimal specimen                           



             code = UA Comment 1) received. Results may be                      

     



                          inaccurate due to the                           



                          age of the specimen.                           



                          Interpret results with                           



                          caution.                               



[facility] [id number] 21:03:00





             Test Item    Value        Reference Range Interpretation Comments

 

             UA Color (test code = Yellow *NA*(3/26/22                          

 



             UA Color)    4:03 PM)                               



[facility] [id number] 21:03:00





             Test Item    Value        Reference Range Interpretation Comments

 

             UA Turbidity (test code Slight *ABN*(3/26/22                       

    



             = UA Turbidity) 4:03 PM)                               



[facility] [id number] 21:03:00





             Test Item    Value        Reference Range Interpretation Comments

 

             UA Spec Grav (test code = UA Spec 1.015 1                          

      



             Grav)                                               



[facility] [id number] 21:03:00





             Test Item    Value        Reference Range Interpretation Comments

 

             UA pH (test code = UA pH) 5.0 1        5.0-8.0                   



[facility] [id number] 21:03:00





             Test Item    Value        Reference Range Interpretation Comments

 

             UA Protein (test code = UA Negative mg/dL                          

 



             Protein)                                            



[facility] [id number] 21:03:00





             Test Item    Value        Reference Range Interpretation Comments

 

             UA Glucose (test code = UA Negative mg/dL                          

 



             Glucose)                                            



[facility] [id number] 21:03:00





             Test Item    Value        Reference Range Interpretation Comments

 

             UA Ketones (test code = UA Negative mg/dL                          

 



             Ketones)                                            



[facility] [id number] 21:03:00





             Test Item    Value        Reference Range Interpretation Comments

 

             UA Bili (test code = Negative *NA*(3/26/22                         

  



             UA Bili)     4:03 PM)                               



[facility] [id number] 21:03:00





             Test Item    Value        Reference Range Interpretation Comments

 

             UA Blood (test code = Negative (3/26/22 4:03                       

    



             UA Blood)    PM)                                    



[facility] [id number] 21:03:00





             Test Item    Value        Reference Range Interpretation Comments

 

             UA Urobilinogen (test code = UA no gt        0.1-1.0               

    



             Urobilinogen)                                        



[facility] [id number] 21:03:00





             Test Item    Value        Reference Range Interpretation Comments

 

             UA Nitrite (test code Negative (3/26/22 4:03                       

    



             = UA Nitrite) PM)                                    



[facility] [id number] 21:03:00





             Test Item    Value        Reference Range Interpretation Comments

 

             UA Leuk Est (test code Large *ABN*(3/26/22                         

  



             = UA Leuk Est) 4:03 PM)                               



[facility] [id number] 21:03:00





             Test Item    Value        Reference Range Interpretation Comments

 

             UA WBC (test code = 64           See_Comment                [Automa

huma message] The



             UA WBC)                                             system which ge

nerated this



                                                                 result transmit

huma



                                                                 reference range

: <=5. The



                                                                 reference range

 was not



                                                                 used to interpr

et this



                                                                 result as zayda

l/abnormal.



[facility] [id number] 21:03:00





             Test Item    Value        Reference Range Interpretation Comments

 

             UA RBC (test code = 2            See_Comment                [Automa

huma message] The



             UA RBC)                                             system which ge

nerated this



                                                                 result transmit

huma



                                                                 reference range

: <=2. The



                                                                 reference range

 was not



                                                                 used to interpr

et this



                                                                 result as zayda

l/abnormal.



[facility] [id number] 21:03:00





             Test Item    Value        Reference Range Interpretation Comments

 

             UA Mucus (test code = UA Mucus) Few /LPF                           

    



[facility] [id number] 21:03:00





             Test Item    Value        Reference Range Interpretation Comments

 

             UA Sq Epi (test code = UA Sq Epi) None Seen                        

      



[facility]2022-03-26 21:03:00





             Test Item    Value        Reference Range Interpretation Comments

 

             Glucose Lvl (test code = Glucose Lvl) 126          70-99           

          



[facility]2022-03-26 21:03:00





             Test Item    Value        Reference Range Interpretation Comments

 

             BUN (test code = BUN) 16           7-22                      



[facility]2022-03-26 21:03:00





             Test Item    Value        Reference Range Interpretation Comments

 

             Creatinine Lvl (test code = Creatinine 0.76         0.50-1.40      

           



             Lvl)                                                



[facility]2022-03-26 21:03:00





             Test Item    Value        Reference Range Interpretation Comments

 

             Sodium Lvl (test code = Sodium Lvl) 140          135-145           

        



[facility]2022-03-26 21:03:00





             Test Item    Value        Reference Range Interpretation Comments

 

             Potassium Lvl (test code = Potassium 3.3          3.5-5.1          

         



             Lvl)                                                



[facility]2022-03-26 21:03:00





             Test Item    Value        Reference Range Interpretation Comments

 

             Chloride Lvl (test code = Chloride Lvl) 111                  

            



[facility]2022-03-26 21:03:00





             Test Item    Value        Reference Range Interpretation Comments

 

             CO2 (test code = CO2) 22           24-32                     



[facility]2022-03-26 21:03:00





             Test Item    Value        Reference Range Interpretation Comments

 

             Calcium Lvl (test code = Calcium Lvl) 8.5          8.5-10.5        

          



[facility]2022-03-26 21:03:00





             Test Item    Value        Reference Range Interpretation Comments

 

             AGAP (test code = AGAP) 10.3         10.0-20.0                 



[facility]2022-03-26 21:03:00





             Test Item    Value        Reference Range Interpretation Comments

 

             eGFR (test code = eGFR) 117                                    



[facility]2022-03-26 21:03:00





             Test Item    Value        Reference Range Interpretation Comments

 

             Lactic Acid Lvl (test code = Lactic 1.1          0.5-2.2           

        



             Acid Lvl)                                           



[facility]





             Test Item    Value        Reference Range Interpretation Comments

 

             Segs (test code = Segs) 68.9         45.0-75.0                 



[place] 731572-03-26 21:03:00





             Test Item    Value        Reference Range Interpretation Comments

 

             Lymphocytes (test code = Lymphocytes) 20.4         20.0-40.0       

          



[place]1572-03-26 21:03:00





             Test Item    Value        Reference Range Interpretation Comments

 

             Monocytes (test code = Monocytes) 8.2          2.0-12.0            

      



[place]1572-03-26 21:03:00





             Test Item    Value        Reference Range Interpretation Comments

 

             Eosinophils (test code = 2.2          See_Comment                [A

utomated message] The



             Eosinophils)                                        system which ge

nerated



                                                                 this result tra

nsmitted



                                                                 reference range

: <=4.0.



                                                                 The reference r

zhen was



                                                                 not used to int

erpret



                                                                 this result as



                                                                 normal/abnormal

.



[place]1572-03-26 21:03:00





             Test Item    Value        Reference Range Interpretation Comments

 

             Basophils (test code = 0.3          See_Comment                [Aut

omated message] The



             Basophils)                                          system which ge

nerated



                                                                 this result tra

nsmitted



                                                                 reference range

: <=1.0.



                                                                 The reference r

zhen was



                                                                 not used to int

erpret



                                                                 this result as



                                                                 normal/abnormal

.



[place]1572-03-26 21:03:00





             Test Item    Value        Reference Range Interpretation Comments

 

             Neutrophils # (test code = Neutrophils 5.5          1.5-8.1        

           



             #)                                                  



[place]1572-03-26 21:03:00





             Test Item    Value        Reference Range Interpretation Comments

 

             Lymphocytes # (test code = Lymphocytes 1.6          1.0-5.5        

           



             #)                                                  



[place]1572-03-26 21:03:00





             Test Item    Value        Reference Range Interpretation Comments

 

             Monocytes # (test code 0.7          See_Comment                [Aut

omated message] The



             = Monocytes #)                                        system which 

generated



                                                                 this result tra

nsmitted



                                                                 reference range

: <=0.8.



                                                                 The reference r

zhen was



                                                                 not used to int

erpret



                                                                 this result as



                                                                 normal/abnormal

.



[place]1572-03-26 21:03:00





             Test Item    Value        Reference Range Interpretation Comments

 

             Eosinophils # (test code 0.2          See_Comment                [A

utomated message] The



             = Eosinophils #)                                        system whic

h generated



                                                                 this result tra

nsmitted



                                                                 reference range

: <=0.5.



                                                                 The reference r

zhen was



                                                                 not used to int

erpret



                                                                 this result as



                                                                 normal/abnormal

.



[facility]xoiqaqXCLZZPSNHB6778-83-38 21:03:00





             Test Item    Value        Reference Range Interpretation Comments

 

             WBC (test code = WBC) 8.0          3.7-10.4                  



[facility] ArlingtonVsgjyuvZIEQWTTUUY8827-06-72 21:03:00





             Test Item    Value        Reference Range Interpretation Comments

 

             RBC (test code = RBC) 5.37         4.70-6.10                 



[facility] ArlingtonJpguhbnXBXFFFVFPQ0158-97-75 21:03:00





             Test Item    Value        Reference Range Interpretation Comments

 

             Hgb (test code = Hgb) 15.9         14.0-18.0                 



[facility] ArlingtonUthiqygJDTNRZGOWD7575-39-17 21:03:00





             Test Item    Value        Reference Range Interpretation Comments

 

             Hct (test code = Hct) 47.3         42.0-54.0                 



[facility] ArlingtonMlczftgAGPDQSUUTQ9225-67-92 21:03:00





             Test Item    Value        Reference Range Interpretation Comments

 

             MCV (test code = MCV) 88.1         80.0-94.0                 



[facility] ArlingtonSeqvwjnXEFFYUDQHN4374-78-40 21:03:00





             Test Item    Value        Reference Range Interpretation Comments

 

             MCH (test code = MCH) 29.6 pg      27.0-31.0                 



[facility] ArlingtonSmwpowyEKHKYUYCYV9288-16-31 21:03:00





             Test Item    Value        Reference Range Interpretation Comments

 

             MCHC (test code = MCHC) 33.6         32.0-36.0                 



[facility] ArlingtonLtfgilvVYBUPERYNK6108-69-22 21:03:00





             Test Item    Value        Reference Range Interpretation Comments

 

             RDW (test code = RDW) 15.3         11.5-14.5                 



[facility] ArlingtonJjmhrdfSLYSNOILEW6464-27-33 21:03:00





             Test Item    Value        Reference Range Interpretation Comments

 

             Platelet (test code = Platelet) 370          133-450               

    



[facility] ArlingtonBsosommQEQUPSMTTB1124-95-12 21:03:00





             Test Item    Value        Reference Range Interpretation Comments

 

             MPV (test code = MPV) 8.1          7.4-10.4                  



[facility]2022-03-26 21:03:00





             Test Item    Value        Reference Range Interpretation Comments

 

             UA Comment 1 (test Suboptimal specimen                           



             code = UA Comment 1) received. Results may be                      

     



                          inaccurate due to the                           



                          age of the specimen.                           



                          Interpret results with                           



                          caution.                               



[facility]2022-03-26 21:03:00





             Test Item    Value        Reference Range Interpretation Comments

 

             UA Color (test code = Yellow *NA*(3/26/22                          

 



             UA Color)    4:03 PM)                               



[facility] [id number] 21:03:00





             Test Item    Value        Reference Range Interpretation Comments

 

             UA Turbidity (test code Slight *ABN*(3/26/22                       

    



             = UA Turbidity) 4:03 PM)                               



[facility] [id number] 21:03:00





             Test Item    Value        Reference Range Interpretation Comments

 

             UA Spec Grav (test code = UA Spec 1.015 1                          

      



             Grav)                                               



[facility] [id number] 21:03:00





             Test Item    Value        Reference Range Interpretation Comments

 

             UA pH (test code = UA pH) 5.0 1        5.0-8.0                   



[facility] [id number] 21:03:00





             Test Item    Value        Reference Range Interpretation Comments

 

             UA Protein (test code = UA Negative mg/dL                          

 



             Protein)                                            



[facility] [id number] 21:03:00





             Test Item    Value        Reference Range Interpretation Comments

 

             UA Glucose (test code = UA Negative mg/dL                          

 



             Glucose)                                            



[facility] [id number] 21:03:00





             Test Item    Value        Reference Range Interpretation Comments

 

             UA Ketones (test code = UA Negative mg/dL                          

 



             Ketones)                                            



[facility] [id number] 21:03:00





             Test Item    Value        Reference Range Interpretation Comments

 

             UA Bili (test code = Negative *NA*(3/26/22                         

  



             UA Bili)     4:03 PM)                               



[facility] [id number] 21:03:00





             Test Item    Value        Reference Range Interpretation Comments

 

             UA Blood (test code = Negative (3/26/22 4:03                       

    



             UA Blood)    PM)                                    



[facility] [id number] 21:03:00





             Test Item    Value        Reference Range Interpretation Comments

 

             UA Urobilinogen (test code = UA no gt        0.1-1.0               

    



             Urobilinogen)                                        



[facility] [id number] 21:03:00





             Test Item    Value        Reference Range Interpretation Comments

 

             UA Nitrite (test code Negative (3/26/22 4:03                       

    



             = UA Nitrite) PM)                                    



[facility] [id number] 21:03:00





             Test Item    Value        Reference Range Interpretation Comments

 

             UA Leuk Est (test code Large *ABN*(3/26/22                         

  



             = UA Leuk Est) 4:03 PM)                               



[facility] [id number] 21:03:00





             Test Item    Value        Reference Range Interpretation Comments

 

             UA WBC (test code = 64           See_Comment                [Automa

huma message] The



             UA WBC)                                             system which ge

nerated this



                                                                 result transmit

huma



                                                                 reference range

: <=5. The



                                                                 reference range

 was not



                                                                 used to interpr

et this



                                                                 result as zayda

l/abnormal.



[facility] AND [id number] 21:03:00





             Test Item    Value        Reference Range Interpretation Comments

 

             UA RBC (test code = 2            See_Comment                [Automa

huma message] The



             UA RBC)                                             system which ge

nerated this



                                                                 result transmit

huma



                                                                 reference range

: <=2. The



                                                                 reference range

 was not



                                                                 used to interpr

et this



                                                                 result as zayda

l/abnormal.



[facility] AND [id number] 21:03:00





             Test Item    Value        Reference Range Interpretation Comments

 

             UA Mucus (test code = UA Mucus) Few /LPF                           

    



[facility] AND [id number] 21:03:00





             Test Item    Value        Reference Range Interpretation Comments

 

             UA Sq Epi (test code = UA Sq Epi) None Seen                        

      



[id number]





             Test Item    Value        Reference Range Interpretation Comments

 

             Lactic Acid Lvl (test code = Lactic 2.4          0.5-2.2           

        



             Acid Lvl)                                           



[id number]58[facility]00





             Test Item    Value        Reference Range Interpretation Comments

 

             Sed Rate (test code = 13           See_Comment                [Auto

mated message] The



             Sed Rate)                                           system which ge

nerated this



                                                                 result transmit

huma



                                                                 reference range

: <=15. The



                                                                 reference range

 was not



                                                                 used to interpr

et this



                                                                 result as zayda

l/abnormal.



[id number] 09:58:00





             Test Item    Value        Reference Range Interpretation Comments

 

             C-REACTIVE PROTEIN (test code = 10.6                               

    



             C-REACTIVE PROTEIN)                                        



[facility] [id number] 09:58:00





             Test Item    Value        Reference Range Interpretation Comments

 

             Lactic Acid Lvl (test code = Lactic 2.4          0.5-2.2           

        



             Acid Lvl)                                           



[facility] Hos[id number] 09:58:00





             Test Item    Value        Reference Range Interpretation Comments

 

             Sed Rate (test code = 13           See_Comment                [Auto

mated message] The



             Sed Rate)                                           system which ge

nerated this



                                                                 result transmit

huma



                                                                 reference range

: <=15. The



                                                                 reference range

 was not



                                                                 used to interpr

et this



                                                                 result as zayda

l/abnormal.



[facility] H[id number] 09:58:00





             Test Item    Value        Reference Range Interpretation Comments

 

             C-REACTIVE PROTEIN (test code = 10.6                               

    



             C-REACTIVE PROTEIN)                                        



[facility]2022-03-25 18:36:00





             Test Item    Value        Reference Range Interpretation Comments

 

             Protein CSF (test code = Protein CSF) 14           15-45           

          



[place] [id number] 18:36:00





             Test Item    Value        Reference Range Interpretation Comments

 

             Glucose CSF (test code = Glucose CSF) 67           45-80           

          



[facility]2022-03-25 18:36:00





             Test Item    Value        Reference Range Interpretation Comments

 

             Tube Num CSF (test xxxxxxx (3/25/22 1:36                           



             code = Tube Num CSF) PM)                                    



[facility]2022-03-25 18:36:00





             Test Item    Value        Reference Range Interpretation Comments

 

             Color CSF (test code Colorless (3/25/22 1:36                       

    



             = Color CSF) PM)                                    



[facility]2022-03-25 18:36:00





             Test Item    Value        Reference Range Interpretation Comments

 

             Clarity CSF (test code = Clear (3/25/22 1:36                       

    



             Clarity CSF) PM)                                    



[facility]2022-03-25 18:36:00





             Test Item    Value        Reference Range Interpretation Comments

 

             Supernat CSF (test Colorless (3/25/22 1:36                         

  



             code = Supernat CSF) PM)                                    



[facility]2022-03-25 18:36:00





             Test Item    Value        Reference Range Interpretation Comments

 

             Nucleated Cells CSF 0            See_Comment                [Automa

huma message] The



             (test code = Nucleated                                        syste

m which generated



             Cells CSF)                                          this result tra

nsmitted



                                                                 reference range

: <=53.



                                                                 The reference r

zhen was



                                                                 not used to int

erpret



                                                                 this result as



                                                                 normal/abnormal

.



[facility]2022-03-25 18:36:00





             Test Item    Value        Reference Range Interpretation Comments

 

             RBC CSF (test code = 0            See_Comment                [Autom

ated message] The



             RBC CSF)                                            system which ge

nerated this



                                                                 result transmit

huma



                                                                 reference range

: <=03. The



                                                                 reference range

 was not



                                                                 used to interpr

et this



                                                                 result as zayda

l/abnormal.



[place] [id number] 18:36:00





             Test Item    Value        Reference Range Interpretation Comments

 

             Comment CSF (test Differential not                           



             code = Comment CSF) performed on WBC count of                      

     



                          less than 5.                           



[facility] [id number] 18:36:00





             Test Item    Value        Reference Range Interpretation Comments

 

             Gram Stain Report Gram Stain Performed By:                         

  



             (test code = Gram [facility]                           



             Stain Report) [facility] w/Gram [id number] 18:36:00





             Test Item    Value        Reference Range Interpretation Comments

 

             Culture: CSF w/Gram Stain (test No Growth                          

    



             code = Culture: CSF w/Gram Stain)                                  

      



[facility] [id number] 18:36:00





             Test Item    Value        Reference Range Interpretation Comments

 

             Protein CSF (test code = Protein CSF) 14           15-45           

          



[facility]2022-03-25 18:36:00





             Test Item    Value        Reference Range Interpretation Comments

 

             Glucose CSF (test code = Glucose CSF) 67           45-80           

          



[facility]2022-03-25 18:36:00





             Test Item    Value        Reference Range Interpretation Comments

 

             Tube Num CSF (test xxxxxxx (3/25/22 1:36                           



             code = Tube Num CSF) PM)                                    



[facility]2022-03-25 18:36:00





             Test Item    Value        Reference Range Interpretation Comments

 

             Color CSF (test code Colorless (3/25/22 1:36                       

    



             = Color CSF) PM)                                    



[facility]2022-03-25 18:36:00





             Test Item    Value        Reference Range Interpretation Comments

 

             Clarity CSF (test code = Clear (3/25/22 1:36                       

    



             Clarity CSF) PM)                                    



[facility]2022-03-25 18:36:00





             Test Item    Value        Reference Range Interpretation Comments

 

             Supernat CSF (test Colorless (3/25/22 1:36                         

  



             code = Supernat CSF) PM)                                    



[facility]2022-03-25 18:36:00





             Test Item    Value        Reference Range Interpretation Comments

 

             Nucleated Cells CSF 0            See_Comment                [Automa

huma message] The



             (test code = Nucleated                                        syste

m which generated



             Cells CSF)                                          this result tra

nsmitted



                                                                 reference range

: <=53.



                                                                 The reference r

hzen was



                                                                 not used to int

erpret



                                                                 this result as



                                                                 normal/abnormal

.



[facility]2022-03-25 18:36:00





             Test Item    Value        Reference Range Interpretation Comments

 

             RBC CSF (test code = 0            See_Comment                [Autom

ated message] The



             RBC CSF)                                            system which ge

nerated this



                                                                 result transmit

huma



                                                                 reference range

: <=03. The



                                                                 reference range

 was not



                                                                 used to interpr

et this



                                                                 result as zayda

l/abnormal.



[facility] [id number] 18:36:00





             Test Item    Value        Reference Range Interpretation Comments

 

             Comment CSF (test Differential not                           



             code = Comment CSF) performed on WBC count of                      

     



                          less than 5.                           



[facility] [id number] 18:36:00





             Test Item    Value        Reference Range Interpretation Comments

 

             Gram Stain Report Gram Stain Performed By:                         

  



             (test code = Gram [facility]                           



             Stain Report) [facility] w/Gram [id number] 18:36:00





             Test Item    Value        Reference Range Interpretation Comments

 

             Culture: CSF w/Gram Stain (test No Growth                          

    



             code = Culture: CSF w/Gram Stain)                                  

      



[facility] H[id number] 08:28:00





             Test Item    Value        Reference Range Interpretation Comments

 

             Coronavirus (COVID-19) Not Detected (3/25/22                       

    



             ZEYAD (test code = 3:28 AM)                               



             Coronavirus (COVID-19)                                        



             ZEYAD)                                                



[id number]:28:00





             Test Item    Value        Reference Range Interpretation Comments

 

             Coronavirus (COVID-19) Not Detected (3/25/22                       

    



             ZEYAD (test code = 3:28 AM)                               



             Coronavirus (COVID-19)                                        



             ZEYAD)                                                



[facility] BANK [id number] 06:48:00





             Test Item    Value        Reference Range Interpretation Comments

 

             Antibody Scrn (test Negative (3/25/22 1:48                         

  



             code = Antibody Scrn) AM)                                    



[facility] [id number] 06:48:00





             Test Item    Value        Reference Range Interpretation Comments

 

             ABO/Rh (test code = ABO/Rh) AB POS                                 



[facility]dd[facility]suRRG[facility]XEDMQY6982[facility]48[facility]





             Test Item    Value        Reference Range Interpretation Comments

 

             Segs (test code = Segs) 70.8         45.0-75.0                 



[facility] HospitalYnlfzcxTVTDNJOQMD9297-32-78 06:48:00





             Test Item    Value        Reference Range Interpretation Comments

 

             Lymphocytes (test code = Lymphocytes) 20.8         20.0-40.0       

          



[facility] HospitalSzgaispLIAVRQTCWD4587-55-52 06:48:00





             Test Item    Value        Reference Range Interpretation Comments

 

             Monocytes (test code = Monocytes) 5.8          2.0-12.0            

      



[place] 731572-03-25 06:48:00





             Test Item    Value        Reference Range Interpretation Comments

 

             Eosinophils (test code = 2.3          See_Comment                [A

utomated message] The



             Eosinophils)                                        system which ge

nerated



                                                                 this result tra

nsmitted



                                                                 reference range

: <=4.0.



                                                                 The reference r

zhen was



                                                                 not used to int

erpret



                                                                 this result as



                                                                 normal/abnormal

.



[place] 731572-03-25 06:48:00





             Test Item    Value        Reference Range Interpretation Comments

 

             Basophils (test code = 0.3          See_Comment                [Aut

omated message] The



             Basophils)                                          system which ge

nerated



                                                                 this result tra

nsmitted



                                                                 reference range

: <=1.0.



                                                                 The reference r

zhen was



                                                                 not used to int

erpret



                                                                 this result as



                                                                 normal/abnormal

.



[place] 731572-03-25 06:48:00





             Test Item    Value        Reference Range Interpretation Comments

 

             Neutrophils # (test code = Neutrophils 8.4          1.5-8.1        

           



             #)                                                  



[place] 731572-03-25 06:48:00





             Test Item    Value        Reference Range Interpretation Comments

 

             Lymphocytes # (test code = Lymphocytes 2.5          1.0-5.5        

           



             #)                                                  



[place] 731572-03-25 06:48:00





             Test Item    Value        Reference Range Interpretation Comments

 

             Monocytes # (test code 0.7          See_Comment                [Aut

omated message] The



             = Monocytes #)                                        system which 

generated



                                                                 this result tra

nsmitted



                                                                 reference range

: <=0.8.



                                                                 The reference r

zhen was



                                                                 not used to int

erpret



                                                                 this result as



                                                                 normal/abnormal

.



[facility]UN[facility]KLWSYX6744[facility]





             Test Item    Value        Reference Range Interpretation Comments

 

             Eosinophils # (test code 0.3          See_Comment                [A

utomated message] The



             = Eosinophils #)                                        system whic

h generated



                                                                 this result tra

nsmitted



                                                                 reference range

: <=0.5.



                                                                 The reference r

zhen was



                                                                 not used to int

erpret



                                                                 this result as



                                                                 normal/abnormal

.



[facility] ArlingtonFirsmozUWCFKNEKLV8834-61-36 06:48:00





             Test Item    Value        Reference Range Interpretation Comments

 

             WBC X 10x3 (test code = WBC X 10x3) 11.9         3.7-10.4          

        



[place] 731572-03-25 06:48:00





             Test Item    Value        Reference Range Interpretation Comments

 

             RBC X 10x6 (test code = RBC X 10x6) 5.95         4.70-6.10         

        



[facility] ArlingtonOlfmfnsKFNHIVZFNR8754-57-15 06:48:00





             Test Item    Value        Reference Range Interpretation Comments

 

             Hgb (test code = Hgb) 17.9         14.0-18.0                 



[facility] ArlingtonTioxochGRIBQZIAHX5775-78-76 06:48:00





             Test Item    Value        Reference Range Interpretation Comments

 

             Hct (test code = Hct) 52.0         42.0-54.0                 



[facility] ArlingtonTwmvrzzNLQVVMKUXP5312-72-26 06:48:00





             Test Item    Value        Reference Range Interpretation Comments

 

             MCV (test code = MCV) 87.5         80.0-94.0                 



[facility] ArlingtonMxdvkdiSILVFLBSCR0865-67-12 06:48:00





             Test Item    Value        Reference Range Interpretation Comments

 

             MCH (test code = MCH) 30.2 pg      27.0-31.0                 



[facility] ArlingtonCeoxwdiNBLRHKEINE4797-52-60 06:48:00





             Test Item    Value        Reference Range Interpretation Comments

 

             MCHC (test code = MCHC) 34.5         32.0-36.0                 



[facility] ArlingtonXxbvjxgUCTQMFQSER0736-31-52 06:48:00





             Test Item    Value        Reference Range Interpretation Comments

 

             RDW (test code = RDW) 15.4         11.5-14.5                 



[facility] ArlingtonUsloqbbSOQVBEHJDP4508-75-03 06:48:00





             Test Item    Value        Reference Range Interpretation Comments

 

             Platelet (test code = Platelet) 414          133-450               

    



[facility] ArlingtonAoxcmnaXEIOJUZBSG4190-00-79 06:48:00





             Test Item    Value        Reference Range Interpretation Comments

 

             MPV (test code = MPV) 8.5          7.4-10.4                  



[facility] ArlingtonDfwvatcBXROWLTJTE3424-17-24 06:48:00





             Test Item    Value        Reference Range Interpretation Comments

 

             PT (test code = PT) 12.9 s       12.0-14.7                 



[facility] ArlingtonTawgapgJLAWRJDVSD6989-93-55 06:48:00





             Test Item    Value        Reference Range Interpretation Comments

 

             INR (test code = INR) 0.98 1       0.85-1.17                 



[facility] ArlingtonXfifxnxRAOSQRSBFQ8283-46-04 06:48:00





             Test Item    Value        Reference Range Interpretation Comments

 

             PTT (test code = PTT) 31.4 s       22.9-35.8                 



[facility] [id number] 06:48:00





             Test Item    Value        Reference Range Interpretation Comments

 

             Antibody Scrn (test Negative (3/25/22 1:48                         

  



             code = Antibody Scrn) AM)                                    



[facility] [id number] 06:48:00





             Test Item    Value        Reference Range Interpretation Comments

 

             ABO/Rh (test code = ABO/Rh) AB POS                                 



[facility] ArlingtonXllcqmhIKLLLMTHQK5285-25-86 06:48:00





             Test Item    Value        Reference Range Interpretation Comments

 

             Segs (test code = Segs) 70.8         45.0-75.0                 



[facility] ArlingtonQsnsavuFUSRDLNUYZ7697-89-13 06:48:00





             Test Item    Value        Reference Range Interpretation Comments

 

             Lymphocytes (test code = Lymphocytes) 20.8         20.0-40.0       

          



[facility] ArlingtonHygbpqlPZSJFHHZYL3145-45-65 06:48:00





             Test Item    Value        Reference Range Interpretation Comments

 

             Monocytes (test code = Monocytes) 5.8          2.0-12.0            

      



[facility] ArlingtonKdmkwoeRSIVXPQUMW0130-49-19 06:48:00





             Test Item    Value        Reference Range Interpretation Comments

 

             Eosinophils (test code = 2.3          See_Comment                [A

utomated message] The



             Eosinophils)                                        system which ge

nerated



                                                                 this result tra

nsmitted



                                                                 reference range

: <=4.0.



                                                                 The reference r

zhen was



                                                                 not used to int

erpret



                                                                 this result as



                                                                 normal/abnormal

.



[facility]00





             Test Item    Value        Reference Range Interpretation Comments

 

             Basophils (test code = 0.3          See_Comment                [Aut

omated message] The



             Basophils)                                          system which ge

nerated



                                                                 this result tra

nsmitted



                                                                 reference range

: <=1.0.



                                                                 The reference r

zhen was



                                                                 not used to int

erpret



                                                                 this result as



                                                                 normal/abnormal

.



[facility]GGLBC[facility]GXRJ6479[facility]27-29 06:48:00





             Test Item    Value        Reference Range Interpretation Comments

 

             Neutrophils # (test code = Neutrophils 8.4          1.5-8.1        

           



             #)                                                  



[facility]mylbilETUOOLRMGW4189-50-75 06:48:00





             Test Item    Value        Reference Range Interpretation Comments

 

             Lymphocytes # (test code = Lymphocytes 2.5          1.0-5.5        

           



             #)                                                  



[facility]tmjhtwHTBNCXAQJY0744-95-20 06:48:00





             Test Item    Value        Reference Range Interpretation Comments

 

             Monocytes # (test code 0.7          See_Comment                [Aut

omated message] The



             = Monocytes #)                                        system which 

generated



                                                                 this result tra

nsmitted



                                                                 reference range

: <=0.8.



                                                                 The reference r

zhen was



                                                                 not used to int

erpret



                                                                 this result as



                                                                 normal/abnormal

.



[facility]gstcfNJQHISAYGV2055-44-10 06:48:00





             Test Item    Value        Reference Range Interpretation Comments

 

             Eosinophils # (test code 0.3          See_Comment                [A

utomated message] The



             = Eosinophils #)                                        system [employer]

[employer] generated



                                                                 this result tra

nsmitted



                                                                 reference range

: <=0.5.



                                                                 The reference r

zhen was



                                                                 not used to int

erpret



                                                                 this result as



                                                                 normal/abnormal

.



[facility]klsvqtEVAWOPXVLU7620-70-30 06:48:00





             Test Item    Value        Reference Range Interpretation Comments

 

             WBC X 10x3 (test code = WBC X 10x3) 11.9         3.7-10.4          

        



[place]1572-03-25 06:48:00





             Test Item    Value        Reference Range Interpretation Comments

 

             RBC X 10x6 (test code = RBC X 10x6) 5.95         4.70-6.10         

        



[place] 731572-03-25 06:48:00





             Test Item    Value        Reference Range Interpretation Comments

 

             Hgb (test code = Hgb) 17.9         14.0-18.0                 



[place] 731572-03-25 06:48:00





             Test Item    Value        Reference Range Interpretation Comments

 

             Hct (test code = Hct) 52.0         42.0-54.0                 



[place] 731572-03-25 06:48:00





             Test Item    Value        Reference Range Interpretation Comments

 

             MCV (test code = MCV) 87.5         80.0-94.0                 



[place] 731572-03-25 06:48:00





             Test Item    Value        Reference Range Interpretation Comments

 

             MCH (test code = MCH) 30.2 pg      27.0-31.0                 



[facility]yvyagoBCCCQXHAOT7041-24-87 06:48:00





             Test Item    Value        Reference Range Interpretation Comments

 

             MCHC (test code = MCHC) 34.5         32.0-36.0                 



[place] 731572-03-25 06:48:00





             Test Item    Value        Reference Range Interpretation Comments

 

             RDW (test code = RDW) 15.4         11.5-14.5                 



[place] 731572-03-25 06:48:00





             Test Item    Value        Reference Range Interpretation Comments

 

             Platelet (test code = Platelet) 414          133-450               

    



[place] 731572-03-25 06:48:00





             Test Item    Value        Reference Range Interpretation Comments

 

             MPV (test code = MPV) 8.5          7.4-10.4                  



[place] 731572-03-25 06:48:00





             Test Item    Value        Reference Range Interpretation Comments

 

             PT (test code = PT) 12.9 s       12.0-14.7                 



[facility]





             Test Item    Value        Reference Range Interpretation Comments

 

             INR (test code = INR) 0.98 1       0.85-1.17                 



[facility]bmyquoBNVKLSJOUV9263-32-57 06:48:00





             Test Item    Value        Reference Range Interpretation Comments

 

             PTT (test code = PTT) 31.4 s       22.9-35.8                 



[facility] WITH AYLQ9266-52-39 00:23:28





             Test Item    Value        Reference Range Interpretation Comments

 

             WBC (test code =              See_Comment  H             [Automated



             6690-2)                                             message] The sy

stem



                                                                 which generated



                                                                 this result



                                                                 transmitted



                                                                 reference range

:



                                                                 4.20 - 10.70



                                                                 10*3/?L. The



                                                                 reference range

 was



                                                                 not used to



                                                                 interpret this



                                                                 result as



                                                                 normal/abnormal

.

 

             RBC (test code =              See_Comment  H             [Automated



             789-8)                                              message] The sy

stem



                                                                 which generated



                                                                 this result



                                                                 transmitted



                                                                 reference range

:



                                                                 4.26 - 5.52



                                                                 10*6/?L. The



                                                                 reference range

 was



                                                                 not used to



                                                                 interpret this



                                                                 result as



                                                                 normal/abnormal

.

 

             HGB (test code = 18.3 g/dL    12.2-16.4    H            



             718-7)                                              

 

             HCT (test code = 55.6 %       38.4-49.3    H            



             4544-3)                                             

 

             MCV (test code = 89.0 fL      81.7-95.6                 



             787-2)                                              

 

             MCH (test code = 29.3 pg      26.1-32.7                 



             785-6)                                              

 

             MCHC (test code = 32.9 g/dL    31.2-35.0                 



             786-4)                                              

 

             RDW-SD (test code = 47.6 fL      38.5-51.6                 



             71295-2)                                            

 

             RDW-CV (test code = 15.1 %       12.1-15.4                 



             788-0)                                              

 

             PLT (test code =              See_Comment  H             [Automated



             777-3)                                              message] The sy

stem



                                                                 which generated



                                                                 this result



                                                                 transmitted



                                                                 reference range

:



                                                                 150 - 328 10*3/

?L.



                                                                 The reference r

zhen



                                                                 was not used to



                                                                 interpret this



                                                                 result as



                                                                 normal/abnormal

.

 

             MPV (test code = 10.5 fL      9.8-13.0                  



             68224-9)                                            

 

             NRBC/100 WBC (test              See_Comment                [Automat

ed



             code = 0354724255)                                        message] 

The system



                                                                 which generated



                                                                 this result



                                                                 transmitted



                                                                 reference range

:



                                                                 0.0 - 10.0 /100



                                                                 WBCs. The refer

ence



                                                                 range was not u

sed



                                                                 to interpret th

is



                                                                 result as



                                                                 normal/abnormal

.

 

             NRBC x10^3 (test code <0.01        See_Comment                [Auto

mated



             = 0189390176)                                        message] The s

ystem



                                                                 which generated



                                                                 this result



                                                                 transmitted



                                                                 reference range

:



                                                                 10*3/?L. The



                                                                 reference range

 was



                                                                 not used to



                                                                 interpret this



                                                                 result as



                                                                 normal/abnormal

.

 

             GRAN MAT (NEUT) % 66.7 %                                 



             (test code = 770-8)                                        

 

             IMM GRAN % (test code 0.40 %                                 



             = 4404826703)                                        

 

             LYMPH % (test code = 24.4 %                                 



             736-9)                                              

 

             MONO % (test code = 6.6 %                                  



             5905-5)                                             

 

             EOS % (test code = 1.5 %                                  



             713-8)                                              

 

             BASO % (test code = 0.4 %                                  



             706-2)                                              

 

             GRAN MAT x10^3(ANC) 7.43 10*3/uL 1.99-6.95    H            



             (test code =                                        



             5408666914)                                         

 

             IMM GRAN x10^3 (test 0.04 10*3/uL 0.00-0.06                 



             code = 5908376698)                                        

 

             LYMPH x10^3 (test code 2.72 10*3/uL 1.09-3.23                 



             = 731-0)                                            

 

             MONO x10^3 (test code 0.74 10*3/uL 0.36-1.02                 



             = 742-7)                                            

 

             EOS x10^3 (test code = 0.17 10*3/uL 0.06-0.53                 



             711-2)                                              

 

             BASO x10^3 (test code 0.04 10*3/uL 0.01-0.09                 



             = 704-7)                                            

 

             Lab Interpretation Abnormal                               



             (test code = 64671-1)                                        



[facility]. METABOLIC PANEL ([id number])2022 
00:18:07





             Test Item    Value        Reference Range Interpretation Comments

 

             NA (test code = 139 mmol/L   135-145                   



             7898203529)                                         

 

             K (test code = 4.8 mmol/L   3.5-5.0                   



             7660101722)                                         

 

             CL (test code = 104 mmol/L                       



             1192759980)                                         

 

             CO2 TOTAL (test code = 22 mmol/L    23-31        L            



             4894286832)                                         

 

             AGAP (test code =              2-16                      



             9177613775)                                         

 

             BUN (test code = 15 mg/dL     7-23                      



             1095186600)                                         

 

             GLUCOSE (test code = 93 mg/dL                         



             9920759015)                                         

 

             CREATININE (test code = 0.67 mg/dL   0.60-1.25                 



             7284771861)                                         

 

             TOTAL BILI (test code = 0.7 mg/dL    0.1-1.1                   



             1897173012)                                         

 

             CALCIUM (test code = 9.8 mg/dL    8.6-10.6                  



             1040354617)                                         

 

             T PROTEIN (test code = 8.9 g/dL     6.3-8.2      H            



             7144752062)                                         

 

             ALBUMIN (test code = 4.9 g/dL     3.5-5.0                   



             8057582489)                                         

 

             ALK PHOS (test code = 126 U/L             H            



             7523001663)                                         

 

             ALTv (test code = 43 U/L       5-50                      



             1742-6)                                             

 

             AST(SGOT) (test code = 28 U/L       13-40                     



             3486525879)                                         

 

             eGFR (test code =              mL/min/1.73m2              



             6589921531)                                         

 

             MAL (test code = MAL) Association of                           



                          Glomerular Filtration                           



                          Rate (GFR) and Staging                           



                          of Kidney Disease*                           



                          +---------------------                           



                          --+-------------------                           



                          --+-------------------                           



                          ------+| GFR                           



                          (mL/min/1.73 m2) ?|                           



                          With Kidney Damage ?|                           



                          ?Without Kidney                           



                          Damage+---------------                           



                          --------+-------------                           



                          --------+-------------                           



                          ------------+| ?>90 ?                           



                          ? ? ? ? ? ? ? ?|                           



                          ?Stage one ? ? ? ? ?|                           



                          ? Normal ? ? ? ? ? ? ?                           



                          ?+--------------------                           



                          ---+------------------                           



                          ---+------------------                           



                          -------+| ?60-89 ? ? ?                           



                          ? ? ? ? ?| ?Stage two                           



                          ? ? ? ? ?| ? Decreased                           



                          GFR ? ? ? ?                            



                          +---------------------                           



                          --+-------------------                           



                          --+-------------------                           



                          ------+| ?30-59 ? ? ?                           



                          ? ? ? ? ?| ?Stage                           



                          three ? ? ? ?| ? Stage                           



                          three ? ? ? ? ?                           



                          +---------------------                           



                          --+-------------------                           



                          --+-------------------                           



                          ------+| ?15-29 ? ? ?                           



                          ? ? ? ? ?| ?Stage four                           



                          ? ? ? ? | ? Stage four                           



                          ? ? ? ? ?                              



                          ?+--------------------                           



                          ---+------------------                           



                          ---+------------------                           



                          -------+| ?<15 (or                           



                          dialysis) ? ?| ?Stage                           



                          five ? ? ? ? | ? Stage                           



                          five ? ? ? ? ?                           



                          ?+--------------------                           



                          ---+------------------                           



                          ---+------------------                           



                          -------+ *Each stage                           



                          assumes the associated                           



                          GFR level has been in                           



                          effect for at least                           



                          three months. ?Stages                           



                          1 to 5, with or                           



                          without kidney                           



                          disease, indicate                           



                          chronic kidney                           



                          disease. Notes:                           



                          Determination of                           



                          stages one and two                           



                          (with eGFR                             



                          >59mL/min/1.73 m2)                           



                          requires estimation of                           



                          kidney damage for at                           



                          least three months as                           



                          defined by structural                           



                          or functional                           



                          abnormalities of the                           



                          kidney, manifested by                           



                          either:Pathological                           



                          abnormalities or                           



                          Markers of kidney                           



                          damage (including                           



                          abnormalities in the                           



                          composition of the                           



                          blood or urine or                           



                          abnormalities in                           



                          imaging tests).                           

 

             Lab Interpretation Abnormal                               



             (test code = 52182-3)                                        



[facility]. METABOLIC PANEL ([id number])2022 
12:48:40





             Test Item    Value        Reference Range Interpretation Comments

 

             NA (test code = 137 mmol/L   135-145                   



             1728852461)                                         

 

             K (test code = 4.5 mmol/L   3.5-5.0                   



             8070456514)                                         

 

             CL (test code = 107 mmol/L                       



             8399893293)                                         

 

             CO2 TOTAL (test code = 24 mmol/L    23-31                     



             7407277230)                                         

 

             AGAP (test code =              2-16                      



             8951276724)                                         

 

             BUN (test code = 16 mg/dL     7-23                      



             5847070665)                                         

 

             GLUCOSE (test code = 116 mg/dL           H            



             8856670180)                                         

 

             CREATININE (test code = 0.62 mg/dL   0.60-1.25                 



             7423078743)                                         

 

             TOTAL BILI (test code = 0.4 mg/dL    0.1-1.1                   



             4454394580)                                         

 

             CALCIUM (test code = 8.7 mg/dL    8.6-10.6                  



             3937400104)                                         

 

             T PROTEIN (test code = 7.5 g/dL     6.3-8.2                   



             0773550537)                                         

 

             ALBUMIN (test code = 3.9 g/dL     3.5-5.0                   



             0186126515)                                         

 

             ALK PHOS (test code = 93 U/L                           



             2798039298)                                         

 

             ALTv (test code = 40 U/L       5-50                      



             1742-6)                                             

 

             AST(SGOT) (test code = 51 U/L       13-40        H            



             7881208958)                                         

 

             eGFR (test code =              mL/min/1.73m2              



             2186890391)                                         

 

             MAL (test code = MAL) Association of                           



                          Glomerular Filtration                           



                          Rate (GFR) and Staging                           



                          of Kidney Disease*                           



                          +---------------------                           



                          --+-------------------                           



                          --+-------------------                           



                          ------+| GFR                           



                          (mL/min/1.73 m2) ?|                           



                          With Kidney Damage ?|                           



                          ?Without Kidney                           



                          Damage+---------------                           



                          --------+-------------                           



                          --------+-------------                           



                          ------------+| ?>90 ?                           



                          ? ? ? ? ? ? ? ?|                           



                          ?Stage one ? ? ? ? ?|                           



                          ? Normal ? ? ? ? ? ? ?                           



                          ?+--------------------                           



                          ---+------------------                           



                          ---+------------------                           



                          -------+| ?60-89 ? ? ?                           



                          ? ? ? ? ?| ?Stage two                           



                          ? ? ? ? ?| ? Decreased                           



                          GFR ? ? ? ?                            



                          +---------------------                           



                          --+-------------------                           



                          --+-------------------                           



                          ------+| ?30-59 ? ? ?                           



                          ? ? ? ? ?| ?Stage                           



                          three ? ? ? ?| ? Stage                           



                          three ? ? ? ? ?                           



                          +---------------------                           



                          --+-------------------                           



                          --+-------------------                           



                          ------+| ?15-29 ? ? ?                           



                          ? ? ? ? ?| ?Stage four                           



                          ? ? ? ? | ? Stage four                           



                          ? ? ? ? ?                              



                          ?+--------------------                           



                          ---+------------------                           



                          ---+------------------                           



                          -------+| ?<15 (or                           



                          dialysis) ? ?| ?Stage                           



                          five ? ? ? ? | ? Stage                           



                          five ? ? ? ? ?                           



                          ?+--------------------                           



                          ---+------------------                           



                          ---+------------------                           



                          -------+ *Each stage                           



                          assumes the associated                           



                          GFR level has been in                           



                          effect for at least                           



                          three months. ?Stages                           



                          1 to 5, with or                           



                          without kidney                           



                          disease, indicate                           



                          chronic kidney                           



                          disease. Notes:                           



                          Determination of                           



                          stages one and two                           



                          (with eGFR                             



                          >59mL/min/1.73 m2)                           



                          requires estimation of                           



                          kidney damage for at                           



                          least three months as                           



                          defined by structural                           



                          or functional                           



                          abnormalities of the                           



                          kidney, manifested by                           



                          either:Pathological                           



                          abnormalities or                           



                          Markers of kidney                           



                          damage (including                           



                          abnormalities in the                           



                          composition of the                           



                          blood or urine or                           



                          abnormalities in                           



                          imaging tests).                           

 

             Lab Interpretation Abnormal                               



             (test code = 83732-5)                                        



[facility] WITH [id number] 12:21:36





             Test Item    Value        Reference Range Interpretation Comments

 

             WBC (test code =              See_Comment                [Automated



             3853-2)                                             message] The sy

stem



                                                                 which generated



                                                                 this result



                                                                 transmitted



                                                                 reference range

:



                                                                 4.20 - 10.70



                                                                 10*3/?L. The



                                                                 reference range

 was



                                                                 not used to



                                                                 interpret this



                                                                 result as



                                                                 normal/abnormal

.

 

             RBC (test code =              See_Comment                [Automated



             478-8)                                              message] The sy

stem



                                                                 which generated



                                                                 this result



                                                                 transmitted



                                                                 reference range

:



                                                                 4.26 - 5.52



                                                                 10*6/?L. The



                                                                 reference range

 was



                                                                 not used to



                                                                 interpret this



                                                                 result as



                                                                 normal/abnormal

.

 

             HGB (test code = 15.7 g/dL    12.2-16.4                 



             718-7)                                              

 

             HCT (test code = 48.0 %       38.4-49.3                 



             4544-3)                                             

 

             MCV (test code = 90.1 fL      81.7-95.6                 



             787-2)                                              

 

             MCH (test code = 29.5 pg      26.1-32.7                 



             785-6)                                              

 

             MCHC (test code = 32.7 g/dL    31.2-35.0                 



             786-4)                                              

 

             RDW-SD (test code = 50.6 fL      38.5-51.6                 



             46527-7)                                            

 

             RDW-CV (test code = 15.3 %       12.1-15.4                 



             788-0)                                              

 

             PLT (test code =              See_Comment  H             [Automated



             777-3)                                              message] The sy

stem



                                                                 which generated



                                                                 this result



                                                                 transmitted



                                                                 reference range

:



                                                                 150 - 328 10*3/

?L.



                                                                 The reference r

zhen



                                                                 was not used to



                                                                 interpret this



                                                                 result as



                                                                 normal/abnormal

.

 

             MPV (test code = 10.3 fL      9.8-13.0                  



             25587-7)                                            

 

             NRBC/100 WBC (test              See_Comment                [Automat

ed



             code = 3716580950)                                        message] 

The system



                                                                 which generated



                                                                 this result



                                                                 transmitted



                                                                 reference range

:



                                                                 0.0 - 10.0 /100



                                                                 WBCs. The refer

ence



                                                                 range was not u

sed



                                                                 to interpret th

is



                                                                 result as



                                                                 normal/abnormal

.

 

             NRBC x10^3 (test code <0.01        See_Comment                [Auto

mated



             = 7405760130)                                        message] The s

ystem



                                                                 which generated



                                                                 this result



                                                                 transmitted



                                                                 reference range

:



                                                                 10*3/?L. The



                                                                 reference range

 was



                                                                 not used to



                                                                 interpret this



                                                                 result as



                                                                 normal/abnormal

.

 

             GRAN MAT (NEUT) % 61.5 %                                 



             (test code = 770-8)                                        

 

             IMM GRAN % (test code 0.80 %                                 



             = 4145767833)                                        

 

             LYMPH % (test code = 27.8 %                                 



             736-9)                                              

 

             MONO % (test code = 6.9 %                                  



             5905-5)                                             

 

             EOS % (test code = 2.4 %                                  



             713-8)                                              

 

             BASO % (test code = 0.6 %                                  



             706-2)                                              

 

             GRAN MAT x10^3(ANC) 6.07 10*3/uL 1.99-6.95                 



             (test code =                                        



             6992835137)                                         

 

             IMM GRAN x10^3 (test 0.08 10*3/uL 0.00-0.06    H            



             code = 0921190226)                                        

 

             LYMPH x10^3 (test code 2.75 10*3/uL 1.09-3.23                 



             = 731-0)                                            

 

             MONO x10^3 (test code 0.68 10*3/uL 0.36-1.02                 



             = 742-7)                                            

 

             EOS x10^3 (test code = 0.24 10*3/uL 0.06-0.53                 



             711-2)                                              

 

             BASO x10^3 (test code 0.06 10*3/uL 0.01-0.09                 



             = 704-7)                                            

 

             Lab Interpretation Abnormal                               



             (test code = 80306-1)                                        



[facility] Metabolic Panel (NA, K, CL, CO2, 
GLUCOSE, BUN, CREATININE, CA)2022 12:40:30





             Test Item    Value        Reference Range Interpretation Comments

 

             NA (test code = 139 mmol/L   135-145                   



             8432935748)                                         

 

             K (test code = 3.9 mmol/L   3.5-5.0                   



             3796381934)                                         

 

             CL (test code = 108 mmol/L                       



             2843128604)                                         

 

             CO2 TOTAL (test code = 25 mmol/L    23-31                     



             5415869298)                                         

 

             AGAP (test code =              2-16                      



             9847925000)                                         

 

             BUN (test code = 14 mg/dL     7-23                      



             8673724175)                                         

 

             GLUCOSE (test code = 107 mg/dL                        



             9708189650)                                         

 

             CREATININE (test code = 0.61 mg/dL   0.60-1.25                 



             1044625478)                                         

 

             CALCIUM (test code = 8.1 mg/dL    8.6-10.6     L            



             8233476827)                                         

 

             eGFR (test code =              mL/min/1.73m2              



             8050463901)                                         

 

             MAL (test code = MAL) Association of                           



                          Glomerular Filtration                           



                          Rate (GFR) and Staging                           



                          of Kidney Disease*                           



                          +---------------------                           



                          --+-------------------                           



                          --+-------------------                           



                          ------+| GFR                           



                          (mL/min/1.73 m2) ?|                           



                          With Kidney Damage ?|                           



                          ?Without Kidney                           



                          Damage+---------------                           



                          --------+-------------                           



                          --------+-------------                           



                          ------------+| ?>90 ?                           



                          ? ? ? ? ? ? ? ?|                           



                          ?Stage one ? ? ? ? ?|                           



                          ? Normal ? ? ? ? ? ? ?                           



                          ?+--------------------                           



                          ---+------------------                           



                          ---+------------------                           



                          -------+| ?60-89 ? ? ?                           



                          ? ? ? ? ?| ?Stage two                           



                          ? ? ? ? ?| ? Decreased                           



                          GFR ? ? ? ?                            



                          +---------------------                           



                          --+-------------------                           



                          --+-------------------                           



                          ------+| ?30-59 ? ? ?                           



                          ? ? ? ? ?| ?Stage                           



                          three ? ? ? ?| ? Stage                           



                          three ? ? ? ? ?                           



                          +---------------------                           



                          --+-------------------                           



                          --+-------------------                           



                          ------+| ?15-29 ? ? ?                           



                          ? ? ? ? ?| ?Stage four                           



                          ? ? ? ? | ? Stage four                           



                          ? ? ? ? ?                              



                          ?+--------------------                           



                          ---+------------------                           



                          ---+------------------                           



                          -------+| ?<15 (or                           



                          dialysis) ? ?| ?Stage                           



                          five ? ? ? ? | ? Stage                           



                          five ? ? ? ? ?                           



                          ?+--------------------                           



                          ---+------------------                           



                          ---+------------------                           



                          -------+ *Each stage                           



                          assumes the associated                           



                          GFR level has been in                           



                          effect for at least                           



                          three months. ?Stages                           



                          1 to 5, with or                           



                          without kidney                           



                          disease, indicate                           



                          chronic kidney                           



                          disease. Notes:                           



                          Determination of                           



                          stages one and two                           



                          (with eGFR                             



                          >59mL/min/1.73 m2)                           



                          requires estimation of                           



                          kidney damage for at                           



                          least three months as                           



                          defined by structural                           



                          or functional                           



                          abnormalities of the                           



                          kidney, manifested by                           



                          either:Pathological                           



                          abnormalities or                           



                          Markers of kidney                           



                          damage (including                           



                          abnormalities in the                           



                          composition of the                           



                          blood or urine or                           



                          abnormalities in                           



                          imaging tests).                           

 

             Lab Interpretation Abnormal                               



             (test code = 49686-2)                                        



[facility] with [id number] 12:23:51





             Test Item    Value        Reference Range Interpretation Comments

 

             WBC (test code =              See_Comment                [Automated



             0590-2)                                             message] The sy

stem



                                                                 which generated



                                                                 this result



                                                                 transmitted



                                                                 reference range

:



                                                                 4.20 - 10.70



                                                                 10*3/?L. The



                                                                 reference range

 was



                                                                 not used to



                                                                 interpret this



                                                                 result as



                                                                 normal/abnormal

.

 

             RBC (test code =              See_Comment                [Automated



             789-8)                                              message] The sy

stem



                                                                 which generated



                                                                 this result



                                                                 transmitted



                                                                 reference range

:



                                                                 4.26 - 5.52



                                                                 10*6/?L. The



                                                                 reference range

 was



                                                                 not used to



                                                                 interpret this



                                                                 result as



                                                                 normal/abnormal

.

 

             HGB (test code = 14.9 g/dL    12.2-16.4                 



             718-7)                                              

 

             HCT (test code = 44.2 %       38.4-49.3                 



             4544-3)                                             

 

             MCV (test code = 88.4 fL      81.7-95.6                 



             787-2)                                              

 

             MCH (test code = 29.8 pg      26.1-32.7                 



             785-6)                                              

 

             MCHC (test code = 33.7 g/dL    31.2-35.0                 



             786-4)                                              

 

             RDW-SD (test code = 49.3 fL      38.5-51.6                 



             22925-0)                                            

 

             RDW-CV (test code = 15.3 %       12.1-15.4                 



             788-0)                                              

 

             PLT (test code =              See_Comment  H             [Automated



             777-3)                                              message] The sy

stem



                                                                 which generated



                                                                 this result



                                                                 transmitted



                                                                 reference range

:



                                                                 150 - 328 10*3/

?L.



                                                                 The reference r

zhen



                                                                 was not used to



                                                                 interpret this



                                                                 result as



                                                                 normal/abnormal

.

 

             MPV (test code = 10.2 fL      9.8-13.0                  



             72786-1)                                            

 

             NRBC/100 WBC (test              See_Comment                [Automat

ed



             code = 1503399313)                                        message] 

The system



                                                                 which generated



                                                                 this result



                                                                 transmitted



                                                                 reference range

:



                                                                 0.0 - 10.0 /100



                                                                 WBCs. The refer

ence



                                                                 range was not u

sed



                                                                 to interpret th

is



                                                                 result as



                                                                 normal/abnormal

.

 

             NRBC x10^3 (test code <0.01        See_Comment                [Auto

mated



             = [id number])                                        message] The s

ystem



                                                                 which generated



                                                                 this result



                                                                 transmitted



                                                                 reference range

:



                                                                 10*3/?L. The



                                                                 reference range

 was



                                                                 not used to



                                                                 interpret this



                                                                 result as



                                                                 normal/abnormal

.

 

             GRAN MAT (NEUT) % 56.7 %                                 



             (test code = 770-8)                                        

 

             IMM GRAN % (test code 0.60 %                                 



             = 5198412050)                                        

 

             LYMPH % (test code = 31.1 %                                 



             736-9)                                              

 

             MONO % (test code = 8.7 %                                  



             5905-5)                                             

 

             EOS % (test code = 2.4 %                                  



             713-8)                                              

 

             BASO % (test code = 0.5 %                                  



             706-2)                                              

 

             GRAN MAT x10^3(ANC) 4.83 10*3/uL 1.99-6.95                 



             (test code =                                        



             5148180971)                                         

 

             IMM GRAN x10^3 (test 0.05 10*3/uL 0.00-0.06                 



             code = 4042120611)                                        

 

             LYMPH x10^3 (test code 2.64 10*3/uL 1.09-3.23                 



             = 731-0)                                            

 

             MONO x10^3 (test code 0.74 10*3/uL 0.36-1.02                 



             = 742-7)                                            

 

             EOS x10^3 (test code = 0.20 10*3/uL 0.06-0.53                 



             711-2)                                              

 

             BASO x10^3 (test code 0.04 10*3/uL 0.01-0.09                 



             = 704-7)                                            

 

             Lab Interpretation Abnormal                               



             (test code = 83434-2)                                        



[id number] 
[facility]14





             Test Item    Value        Reference Range Interpretation Comments

 

             NA (test code = 139 mmol/L   135-145                   



             1128575132)                                         

 

             K (test code = 4.5 mmol/L   3.5-5.0                   



             7788091848)                                         

 

             CL (test code = 102 mmol/L                       



             5533586875)                                         

 

             CO2 TOTAL (test code = 26 mmol/L    23-31                     



             4356514732)                                         

 

             AGAP (test code =              2-16                      



             5545067624)                                         

 

             BUN (test code = 16 mg/dL     7-23                      



             7816362544)                                         

 

             GLUCOSE (test code = 99 mg/dL                         



             0417451914)                                         

 

             CREATININE (test code = 0.83 mg/dL   0.60-1.25                 



             9237677724)                                         

 

             TOTAL BILI (test code = 0.6 mg/dL    0.1-1.1                   



             5294520964)                                         

 

             CALCIUM (test code = 9.5 mg/dL    8.6-10.6                  



             3642366169)                                         

 

             T PROTEIN (test code = 8.6 g/dL     6.3-8.2      H            



             7371434272)                                         

 

             ALBUMIN (test code = 4.6 g/dL     3.5-5.0                   



             2361852774)                                         

 

             ALK PHOS (test code = 121 U/L                          



             7026135235)                                         

 

             ALTv (test code = 58 U/L       5-50         H            



             -)                                             

 

             AST(SGOT) (test code = 32 U/L       13-40                     



             7314154073)                                         

 

             eGFR (test code =              mL/min/1.73m2              



             2395594993)                                         

 

             MAL (test code = MAL) Association of                           



                          Glomerular Filtration                           



                          Rate (GFR) and Staging                           



                          of Kidney Disease*                           



                          +---------------------                           



                          --+-------------------                           



                          --+-------------------                           



                          ------+| GFR                           



                          (mL/min/1.73 m2) ?|                           



                          With Kidney Damage ?|                           



                          ?Without Kidney                           



                          Damage+---------------                           



                          --------+-------------                           



                          --------+-------------                           



                          ------------+| ?>90 ?                           



                          ? ? ? ? ? ? ? ?|                           



                          ?Stage one ? ? ? ? ?|                           



                          ? Normal ? ? ? ? ? ? ?                           



                          ?+--------------------                           



                          ---+------------------                           



                          ---+------------------                           



                          -------+| ?60-89 ? ? ?                           



                          ? ? ? ? ?| ?Stage two                           



                          ? ? ? ? ?| ? Decreased                           



                          GFR ? ? ? ?                            



                          +---------------------                           



                          --+-------------------                           



                          --+-------------------                           



                          ------+| ?30-59 ? ? ?                           



                          ? ? ? ? ?| ?Stage                           



                          three ? ? ? ?| ? Stage                           



                          three ? ? ? ? ?                           



                          +---------------------                           



                          --+-------------------                           



                          --+-------------------                           



                          ------+| ?15-29 ? ? ?                           



                          ? ? ? ? ?| ?Stage four                           



                          ? ? ? ? | ? Stage four                           



                          ? ? ? ? ?                              



                          ?+--------------------                           



                          ---+------------------                           



                          ---+------------------                           



                          -------+| ?<15 (or                           



                          dialysis) ? ?| ?Stage                           



                          five ? ? ? ? | ? Stage                           



                          five ? ? ? ? ?                           



                          ?+--------------------                           



                          ---+------------------                           



                          ---+------------------                           



                          -------+ *Each stage                           



                          assumes the associated                           



                          GFR level has been in                           



                          effect for at least                           



                          three months. ?Stages                           



                          1 to 5, with or                           



                          without kidney                           



                          disease, indicate                           



                          chronic kidney                           



                          disease. Notes:                           



                          Determination of                           



                          stages one and two                           



                          (with eGFR                             



                          >59mL/min/1.73 m2)                           



                          requires estimation of                           



                          kidney damage for at                           



                          least three months as                           



                          defined by structural                           



                          or functional                           



                          abnormalities of the                           



                          kidney, manifested by                           



                          either:Pathological                           



                          abnormalities or                           



                          Markers of kidney                           



                          damage (including                           



                          abnormalities in the                           



                          composition of the                           



                          blood or urine or                           



                          abnormalities in                           



                          imaging tests).                           

 

             Lab Interpretation Abnormal                               



             (test code = 64921-3)                                        



[facility] WITH [id number] 05:48:31





             Test Item    Value        Reference Range Interpretation Comments

 

             WBC (test code =              See_Comment  H             [Automated



             6690-2)                                             message] The sy

stem



                                                                 which generated



                                                                 this result



                                                                 transmitted



                                                                 reference range

:



                                                                 4.20 - 10.70



                                                                 10*3/?L. The



                                                                 reference range

 was



                                                                 not used to



                                                                 interpret this



                                                                 result as



                                                                 normal/abnormal

.

 

             RBC (test code =              See_Comment  H             [Automated



             789-8)                                              message] The sy

stem



                                                                 which generated



                                                                 this result



                                                                 transmitted



                                                                 reference range

:



                                                                 4.26 - 5.52



                                                                 10*6/?L. The



                                                                 reference range

 was



                                                                 not used to



                                                                 interpret this



                                                                 result as



                                                                 normal/abnormal

.

 

             HGB (test code = 17.3 g/dL    12.2-16.4    H            



             718-7)                                              

 

             HCT (test code = 51.4 %       38.4-49.3    H            



             4544-3)                                             

 

             MCV (test code = 87.7 fL      81.7-95.6                 



             787-2)                                              

 

             MCH (test code = 29.5 pg      26.1-32.7                 



             785-6)                                              

 

             MCHC (test code = 33.7 g/dL    31.2-35.0                 



             786-4)                                              

 

             RDW-SD (test code = 49.1 fL      38.5-51.6                 



             62635-5)                                            

 

             RDW-CV (test code = 15.5 %       12.1-15.4    H            



             788-0)                                              

 

             PLT (test code =              See_Comment  H             [Automated



             777-3)                                              message] The sy

stem



                                                                 which generated



                                                                 this result



                                                                 transmitted



                                                                 reference range

:



                                                                 150 - 328 10*3/

?L.



                                                                 The reference r

zhen



                                                                 was not used to



                                                                 interpret this



                                                                 result as



                                                                 normal/abnormal

.

 

             MPV (test code = 10.4 fL      9.8-13.0                  



             43781-1)                                            

 

             NRBC/100 WBC (test              See_Comment                [Automat

ed



             code = 1009354132)                                        message] 

The system



                                                                 which generated



                                                                 this result



                                                                 transmitted



                                                                 reference range

:



                                                                 0.0 - 10.0 /100



                                                                 WBCs. The refer

ence



                                                                 range was not u

sed



                                                                 to interpret th

is



                                                                 result as



                                                                 normal/abnormal

.

 

             NRBC x10^3 (test code <0.01        See_Comment                [Auto

mated



             = 8828892948)                                        message] The s

ystem



                                                                 which generated



                                                                 this result



                                                                 transmitted



                                                                 reference range

:



                                                                 10*3/?L. The



                                                                 reference range

 was



                                                                 not used to



                                                                 interpret this



                                                                 result as



                                                                 normal/abnormal

.

 

             GRAN MAT (NEUT) % 64.8 %                                 



             (test code = 770-8)                                        

 

             IMM GRAN % (test code 0.70 %                                 



             = 8578235843)                                        

 

             LYMPH % (test code = 25.2 %                                 



             736-9)                                              

 

             MONO % (test code = 6.9 %                                  



             5905-5)                                             

 

             EOS % (test code = 1.9 %                                  



             713-8)                                              

 

             BASO % (test code = 0.5 %                                  



             706-2)                                              

 

             GRAN MAT x10^3(ANC) 7.80 10*3/uL 1.99-6.95    H            



             (test code =                                        



             [id number])                                         

 

             IMM GRAN x10^3 (test 0.08 10*3/uL 0.00-0.06    H            



             code = 3127279687)                                        

 

             LYMPH x10^3 (test code 3.04 10*3/uL 1.09-3.23                 



             = 731-0)                                            

 

             MONO x10^3 (test code 0.83 10*3/uL 0.36-1.02                 



             = 742-7)                                            

 

             EOS x10^3 (test code = 0.23 10*3/uL 0.06-0.53                 



             711-2)                                              

 

             BASO x10^3 (test code 0.06 10*3/uL 0.01-0.09                 



             = 704-7)                                            

 

             Lab Interpretation Abnormal                               



             (test code = 63241-9)                                        



[id number] 
[facility]19:08





             Test Item    Value        Reference Range Interpretation Comments

 

             NA (test code = 136 mmol/L   135-145                   



             3704650309)                                         

 

             K (test code = 4.4 mmol/L   3.5-5.0                   



             9116512478)                                         

 

             CL (test code = 99 mmol/L                        



             0968946981)                                         

 

             CO2 TOTAL (test code = 25 mmol/L    23-31                     



             4421447978)                                         

 

             AGAP (test code =              2-16                      



             2334099821)                                         

 

             BUN (test code = 19 mg/dL     7-23                      



             0677271664)                                         

 

             GLUCOSE (test code = 103 mg/dL                        



             6175452688)                                         

 

             CREATININE (test code = 0.70 mg/dL   0.60-1.25                 



             4092977613)                                         

 

             TOTAL BILI (test code = 0.7 mg/dL    0.1-1.1                   



             2251690582)                                         

 

             CALCIUM (test code = 9.2 mg/dL    8.6-10.6                  



             6545325181)                                         

 

             T PROTEIN (test code = 9.4 g/dL     6.3-8.2      H            



             5156943812)                                         

 

             ALBUMIN (test code = 4.8 g/dL     3.5-5.0                   



             7857386879)                                         

 

             ALK PHOS (test code = 146 U/L             H            



             9018191510)                                         

 

             ALTv (test code = 75 U/L       5-50         H            



             1742-6)                                             

 

             AST(SGOT) (test code = 37 U/L       13-40                     



             9490535123)                                         

 

             eGFR (test code =              mL/min/1.73m2              



             2688795182)                                         

 

             MAL (test code = AML) Association of                           



                          Glomerular Filtration                           



                          Rate (GFR) and Staging                           



                          of Kidney Disease*                           



                          +---------------------                           



                          --+-------------------                           



                          --+-------------------                           



                          ------+| GFR                           



                          (mL/min/1.73 m2) ?|                           



                          With Kidney Damage ?|                           



                          ?Without Kidney                           



                          Damage+---------------                           



                          --------+-------------                           



                          --------+-------------                           



                          ------------+| ?>90 ?                           



                          ? ? ? ? ? ? ? ?|                           



                          ?Stage one ? ? ? ? ?|                           



                          ? Normal ? ? ? ? ? ? ?                           



                          ?+--------------------                           



                          ---+------------------                           



                          ---+------------------                           



                          -------+| ?60-89 ? ? ?                           



                          ? ? ? ? ?| ?Stage two                           



                          ? ? ? ? ?| ? Decreased                           



                          GFR ? ? ? ?                            



                          +---------------------                           



                          --+-------------------                           



                          --+-------------------                           



                          ------+| ?30-59 ? ? ?                           



                          ? ? ? ? ?| ?Stage                           



                          three ? ? ? ?| ? Stage                           



                          three ? ? ? ? ?                           



                          +---------------------                           



                          --+-------------------                           



                          --+-------------------                           



                          ------+| ?15-29 ? ? ?                           



                          ? ? ? ? ?| ?Stage four                           



                          ? ? ? ? | ? Stage four                           



                          ? ? ? ? ?                              



                          ?+--------------------                           



                          ---+------------------                           



                          ---+------------------                           



                          -------+| ?<15 (or                           



                          dialysis) ? ?| ?Stage                           



                          five ? ? ? ? | ? Stage                           



                          five ? ? ? ? ?                           



                          ?+--------------------                           



                          ---+------------------                           



                          ---+------------------                           



                          -------+ *Each stage                           



                          assumes the associated                           



                          GFR level has been in                           



                          effect for at least                           



                          three months. ?Stages                           



                          1 to 5, with or                           



                          without kidney                           



                          disease, indicate                           



                          chronic kidney                           



                          disease. Notes:                           



                          Determination of                           



                          stages one and two                           



                          (with eGFR                             



                          >59mL/min/1.73 m2)                           



                          requires estimation of                           



                          kidney damage for at                           



                          least three months as                           



                          defined by structural                           



                          or functional                           



                          abnormalities of the                           



                          kidney, manifested by                           



                          either:Pathological                           



                          abnormalities or                           



                          Markers of kidney                           



                          damage (including                           



                          abnormalities in the                           



                          composition of the                           



                          blood or urine or                           



                          abnormalities in                           



                          imaging tests).                           

 

             Lab Interpretation Abnormal                               



             (test code = 76745-8)                                        



[facility]





             Test Item    Value        Reference Range Interpretation Comments

 

             LIPASE (test code = 2055611313) 86 U/L       0-220                 

    

 

             Lab Interpretation (test code = Normal                             

    



             61267-0)                                            



[id number] 01:55:49





             Test Item    Value        Reference Range Interpretation Comments

 

             WBC (test code =              See_Comment  H             [Automated



             6690-2)                                             message] The sy

stem



                                                                 which generated



                                                                 this result



                                                                 transmitted



                                                                 reference range

:



                                                                 4.20 - 10.70



                                                                 10*3/?L. The



                                                                 reference range

 was



                                                                 not used to



                                                                 interpret this



                                                                 result as



                                                                 normal/abnormal

.

 

             RBC (test code =              See_Comment  H             [Automated



             789-8)                                              message] The sy

stem



                                                                 which generated



                                                                 this result



                                                                 transmitted



                                                                 reference range

:



                                                                 4.26 - 5.52



                                                                 10*6/?L. The



                                                                 reference range

 was



                                                                 not used to



                                                                 interpret this



                                                                 result as



                                                                 normal/abnormal

.

 

             HGB (test code = 18.0 g/dL    12.2-16.4    H            



             718-7)                                              

 

             HCT (test code = 53.9 %       38.4-49.3    H            



             4544-3)                                             

 

             MCV (test code = 87.2 fL      81.7-95.6                 



             787-2)                                              

 

             MCH (test code = 29.1 pg      26.1-32.7                 



             785-6)                                              

 

             MCHC (test code = 33.4 g/dL    31.2-35.0                 



             786-4)                                              

 

             RDW-SD (test code = 48.7 fL      38.5-51.6                 



             39717-6)                                            

 

             RDW-CV (test code = 15.4 %       12.1-15.4                 



             788-0)                                              

 

             PLT (test code =              See_Comment  H             [Automated



             777-3)                                              message] The sy

stem



                                                                 which generated



                                                                 this result



                                                                 transmitted



                                                                 reference range

:



                                                                 150 - 328 10*3/

?L.



                                                                 The reference r

zhen



                                                                 was not used to



                                                                 interpret this



                                                                 result as



                                                                 normal/abnormal

.

 

             MPV (test code = 9.9 fL       9.8-13.0                  



             70181-5)                                            

 

             NRBC/100 WBC (test              See_Comment                [Automat

ed



             code = 4481372883)                                        message] 

The system



                                                                 which generated



                                                                 this result



                                                                 transmitted



                                                                 reference range

:



                                                                 0.0 - 10.0 /100



                                                                 WBCs. The refer

ence



                                                                 range was not u

sed



                                                                 to interpret th

is



                                                                 result as



                                                                 normal/abnormal

.

 

             NRBC x10^3 (test code <0.01        See_Comment                [Auto

mated



             = 7574242569)                                        message] The s

[employer]



                                                                 which generated



                                                                 this result



                                                                 transmitted



                                                                 reference range

:



                                                                 10*3/?L. The



                                                                 reference range

 was



                                                                 not used to



                                                                 interpret this



                                                                 result as



                                                                 normal/abnormal

.

 

             GRAN MAT (NEUT) % 69.8 %                                 



             (test code = 770-8)                                        

 

             IMM GRAN % (test code 0.50 %                                 



             = 1747572556)                                        

 

             LYMPH % (test code = 20.5 %                                 



             736-9)                                              

 

             MONO % (test code = 6.8 %                                  



             5905-5)                                             

 

             EOS % (test code = 1.9 %                                  



             713-8)                                              

 

             BASO % (test code = 0.5 %                                  



             706-2)                                              

 

             GRAN MAT x10^3(ANC) 7.72 10*3/uL 1.99-6.95    H            



             (test code =                                        



             8791746513)                                         

 

             IMM GRAN x10^3 (test 0.05 10*3/uL 0.00-0.06                 



             code = 2527975091)                                        

 

             LYMPH x10^3 (test code 2.26 10*3/uL 1.09-3.23                 



             = 731-0)                                            

 

             MONO x10^3 (test code 0.75 10*3/uL 0.36-1.02                 



             = 742-7)                                            

 

             EOS x10^3 (test code = 0.21 10*3/uL 0.06-0.53                 



             711-2)                                              

 

             BASO x10^3 (test code 0.06 10*3/uL 0.01-0.09                 



             = 704-7)                                            

 

             Lab Interpretation Abnormal                               



             (test code = 15826-9)                                        



[id number]52





             Test Item    Value        Reference    Interpretation Comments



                                       Range                     

 

             D-DIMER (test code =              See_Comment                [Autom

ated



             8322928191)                                         message] The



                                                                 system which



                                                                 generated this



                                                                 result



                                                                 transmitted



                                                                 reference range

:



                                                                 <0.41 ?g/mL



                                                                 (FEU). The



                                                                 reference range



                                                                 was not used to



                                                                 interpret this



                                                                 result as



                                                                 normal/abnormal

.

 

             MAL (test code = This test may be                           



             AML)         used in conjunction                           



                          with a clinical                           



                          pretest probability                           



                          (PTP) assessment                           



                          model to exclude                           



                          venous                                 



                          thromboembolism                           



                          (VTE) in patients                           



                          suspected of deep                           



                          venous thrombosis                           



                          (DVT) and pulmonary                           



                          embolism (PE) A                           



                          D-Dimer value less                           



                          than 0.50 ?g/ml                           



                          (FEU) has a negative                           



                          predicative value of                           



                          96 to 100% (95%                           



                          CI)and 97 to 100%                           



                          (95% CI) as an aid                           



                          in the diagnosis of                           



                          deep vein thrombosis                           



                          (DVT) and pulmonary                           



                          embolism when there                           



                          is low or moderate                           



                          pretest probability                           



                          of PE or DVT.                           



                          D-Dimer values are                           



                          expressed in initial                           



                          fibrinogen                             



                          equivalent units                           



                          (FEU)" The assay                           



                          results should be                           



                          used with other                           



                          information,                           



                          including the                           



                          clinical context, in                           



                          forming a diagnosis.                           

 

             Lab Interpretation Normal                                 



             (test code =                                        



             89797-9)                                            



[facility]. METABOLIC PANEL ([id number])2022-01-15 
22:42:48





             Test Item    Value        Reference Range Interpretation Comments

 

             NA (test code = 135 mmol/L   135-145                   



             6808536016)                                         

 

             K (test code = 4.6 mmol/L   3.5-5.0                   



             3260528160)                                         

 

             CL (test code = 102 mmol/L                       



             9769116424)                                         

 

             CO2 TOTAL (test code = 24 mmol/L    23-31                     



             5473769616)                                         

 

             AGAP (test code =              2-16                      



             9693781655)                                         

 

             BUN (test code = 17 mg/dL     7-23                      



             9365366547)                                         

 

             GLUCOSE (test code = 107 mg/dL                        



             4869065142)                                         

 

             CREATININE (test code = 0.60 mg/dL   0.60-1.25                 



             3531900779)                                         

 

             TOTAL BILI (test code = 1.0 mg/dL    0.1-1.1                   



             0847678087)                                         

 

             CALCIUM (test code = 9.4 mg/dL    8.6-10.6                  



             8571947292)                                         

 

             T PROTEIN (test code = 8.6 g/dL     6.3-8.2      H            



             4306027346)                                         

 

             ALBUMIN (test code = 4.6 g/dL     3.5-5.0                   



             9001848648)                                         

 

             ALK PHOS (test code = 159 U/L             H            



             2769878167)                                         

 

             ALTv (test code = 77 U/L       5-50         H            



             1742-6)                                             

 

             AST(SGOT) (test code = 38 U/L       13-40                     



             7463511056)                                         

 

             eGFR (test code =              mL/min/1.73m2              



             3008442157)                                         

 

             MAL (test code = MAL) Association of                           



                          Glomerular Filtration                           



                          Rate (GFR) and Staging                           



                          of Kidney Disease*                           



                          +---------------------                           



                          --+-------------------                           



                          --+-------------------                           



                          ------+| GFR                           



                          (mL/min/1.73 m2) ?|                           



                          With Kidney Damage ?|                           



                          ?Without Kidney                           



                          Damage+---------------                           



                          --------+-------------                           



                          --------+-------------                           



                          ------------+| ?>90 ?                           



                          ? ? ? ? ? ? ? ?|                           



                          ?Stage one ? ? ? ? ?|                           



                          ? Normal ? ? ? ? ? ? ?                           



                          ?+--------------------                           



                          ---+------------------                           



                          ---+------------------                           



                          -------+| ?60-89 ? ? ?                           



                          ? ? ? ? ?| ?Stage two                           



                          ? ? ? ? ?| ? Decreased                           



                          GFR ? ? ? ?                            



                          +---------------------                           



                          --+-------------------                           



                          --+-------------------                           



                          ------+| ?30-59 ? ? ?                           



                          ? ? ? ? ?| ?Stage                           



                          three ? ? ? ?| ? Stage                           



                          three ? ? ? ? ?                           



                          +---------------------                           



                          --+-------------------                           



                          --+-------------------                           



                          ------+| ?15-29 ? ? ?                           



                          ? ? ? ? ?| ?Stage four                           



                          ? ? ? ? | ? Stage four                           



                          ? ? ? ? ?                              



                          ?+--------------------                           



                          ---+------------------                           



                          ---+------------------                           



                          -------+| ?<15 (or                           



                          dialysis) ? ?| ?Stage                           



                          five ? ? ? ? | ? Stage                           



                          five ? ? ? ? ?                           



                          ?+--------------------                           



                          ---+------------------                           



                          ---+------------------                           



                          -------+ *Each stage                           



                          assumes the associated                           



                          GFR level has been in                           



                          effect for at least                           



                          three months. ?Stages                           



                          1 to 5, with or                           



                          without kidney                           



                          disease, indicate                           



                          chronic kidney                           



                          disease. Notes:                           



                          Determination of                           



                          stages one and two                           



                          (with eGFR                             



                          >59mL/min/1.73 m2)                           



                          requires estimation of                           



                          kidney damage for at                           



                          least three months as                           



                          defined by structural                           



                          or functional                           



                          abnormalities of the                           



                          kidney, manifested by                           



                          either:Pathological                           



                          abnormalities or                           



                          Markers of kidney                           



                          damage (including                           



                          abnormalities in the                           



                          composition of the                           



                          blood or urine or                           



                          abnormalities in                           



                          imaging tests).                           

 

             Lab Interpretation Abnormal                               



             (test code = 12995-8)                                        



[facility] WITH [id number] 22:30:27





             Test Item    Value        Reference Range Interpretation Comments

 

             WBC (test code =              See_Comment  H             [Automated



             9590-2)                                             message] The



                                                                 system which



                                                                 generated this



                                                                 result transmit

huma



                                                                 reference range

:



                                                                 4.20 - 10.70



                                                                 10*3/?L. The



                                                                 reference range



                                                                 was not used to



                                                                 interpret this



                                                                 result as



                                                                 normal/abnormal

.

 

             RBC (test code =              See_Comment  H             [Automated



             299-8)                                              message] The



                                                                 system which



                                                                 generated this



                                                                 result transmit

huma



                                                                 reference range

:



                                                                 4.26 - 5.52



                                                                 10*6/?L. The



                                                                 reference range



                                                                 was not used to



                                                                 interpret this



                                                                 result as



                                                                 normal/abnormal

.

 

             HGB (test code = 17.5 g/dL    12.2-16.4    H            



             718-7)                                              

 

             HCT (test code = 51.0 %       38.4-49.3    H            



             4544-3)                                             

 

             MCV (test code = 86.7 fL      81.7-95.6                 



             787-2)                                              

 

             MCH (test code = 29.8 pg      26.1-32.7                 



             785-6)                                              

 

             MCHC (test code = 34.3 g/dL    31.2-35.0                 



             786-4)                                              

 

             RDW-SD (test code = 48.0 fL      38.5-51.6                 



             09426-6)                                            

 

             RDW-CV (test code = 15.1 %       12.1-15.4                 



             788-0)                                              

 

             PLT (test code =              See_Comment  H             [Automated



             777-3)                                              message] The



                                                                 system which



                                                                 generated this



                                                                 result transmit

huma



                                                                 reference range

:



                                                                 150 - 328 10*3/

?L.



                                                                 The reference



                                                                 range was not u

sed



                                                                 to interpret th

is



                                                                 result as



                                                                 normal/abnormal

.

 

             MPV (test code = 10.3 fL      9.8-13.0                  



             45375-2)                                            

 

             NRBC/100 WBC (test              See_Comment                [Automat

ed



             code = [id number])                                        message] 

The



                                                                 system which



                                                                 generated this



                                                                 result transmit

huma



                                                                 reference range

:



                                                                 0.0 - 10.0 /100



                                                                 WBCs. The



                                                                 reference range



                                                                 was not used to



                                                                 interpret this



                                                                 result as



                                                                 normal/abnormal

.

 

             NRBC x10^3 (test code <0.01        See_Comment                [Auto

mated



             = [id number])                                        message] The



                                                                 system which



                                                                 generated this



                                                                 result transmit

huma



                                                                 reference range

:



                                                                 10*3/?L. The



                                                                 reference range



                                                                 was not used to



                                                                 interpret this



                                                                 result as



                                                                 normal/abnormal

.

 

             GRAN MAT (NEUT) % 79.9 %                                 



             (test code = 770-8)                                        

 

             IMM GRAN % (test code 0.50 %                                 



             = 2760526488)                                        

 

             LYMPH % (test code = 11.8 %                                 



             736-9)                                              

 

             MONO % (test code = 6.6 %                                  



             5905-5)                                             

 

             EOS % (test code = 0.9 %                                  



             713-8)                                              

 

             BASO % (test code = 0.3 %                                  



             706-2)                                              

 

             GRAN MAT x10^3(ANC) 10.70 10*3/uL 1.99-6.95    H            



             (test code =                                        



             2064652368)                                         

 

             IMM GRAN x10^3 (test 0.07 10*3/uL 0.00-0.06    H            



             code = 0296148817)                                        

 

             LYMPH x10^3 (test code 1.58 10*3/uL 1.09-3.23                 



             = 731-0)                                            

 

             MONO x10^3 (test code 0.89 10*3/uL 0.36-1.02                 



             = 742-7)                                            

 

             EOS x10^3 (test code = 0.12 10*3/uL 0.06-0.53                 



             711-2)                                              

 

             BASO x10^3 (test code 0.04 10*3/uL 0.01-0.09                 



             = 704-7)                                            

 

             Lab Interpretation Abnormal                               



             (test code = 08001-4)                                        



[facility]. METABOLIC PANEL [id number] 
23:02:15





             Test Item    Value        Reference Range Interpretation Comments

 

             NA (test code = 137 mmol/L   135-145                   



             2493785830)                                         

 

             K (test code = 4.5 mmol/L   3.5-5.0                   



             1707376994)                                         

 

             CL (test code = 109 mmol/L          H            



             8594678359)                                         

 

             CO2 TOTAL (test code = 22 mmol/L    23-31        L            



             6904853485)                                         

 

             AGAP (test code =              2-16                      



             1181429567)                                         

 

             BUN (test code = 7 mg/dL      7-23                      



             3598384189)                                         

 

             GLUCOSE (test code = 87 mg/dL                         



             1117352286)                                         

 

             CREATININE (test code = 0.61 mg/dL   0.60-1.25                 



             4677663023)                                         

 

             TOTAL BILI (test code = 0.5 mg/dL    0.1-1.1                   



             5218634267)                                         

 

             CALCIUM (test code = 9.0 mg/dL    8.6-10.6                  



             6494668211)                                         

 

             T PROTEIN (test code = 6.9 g/dL     6.3-8.2                   



             8003165671)                                         

 

             ALBUMIN (test code = 3.5 g/dL     3.5-5.0                   



             3401393284)                                         

 

             ALK PHOS (test code = 112 U/L                          



             8656056262)                                         

 

             ALTv (test code = 35 U/L       5-50                      



             1742-6)                                             

 

             AST(SGOT) (test code = 21 U/L       13-40                     



             4681945759)                                         

 

             eGFR (test code =              mL/min/1.73m2              



             4488832568)                                         

 

             MAL (test code = MAL) Association of                           



                          Glomerular Filtration                           



                          Rate (GFR) and Staging                           



                          of Kidney Disease*                           



                          +---------------------                           



                          --+-------------------                           



                          --+-------------------                           



                          ------+| GFR                           



                          (mL/min/1.73 m2) ?|                           



                          With Kidney Damage ?|                           



                          ?Without Kidney                           



                          Damage+---------------                           



                          --------+-------------                           



                          --------+-------------                           



                          ------------+| ?>90 ?                           



                          ? ? ? ? ? ? ? ?|                           



                          ?Stage one ? ? ? ? ?|                           



                          ? Normal ? ? ? ? ? ? ?                           



                          ?+--------------------                           



                          ---+------------------                           



                          ---+------------------                           



                          -------+| ?60-89 ? ? ?                           



                          ? ? ? ? ?| ?Stage two                           



                          ? ? ? ? ?| ? Decreased                           



                          GFR ? ? ? ?                            



                          +---------------------                           



                          --+-------------------                           



                          --+-------------------                           



                          ------+| ?30-59 ? ? ?                           



                          ? ? ? ? ?| ?Stage                           



                          three ? ? ? ?| ? Stage                           



                          three ? ? ? ? ?                           



                          +---------------------                           



                          --+-------------------                           



                          --+-------------------                           



                          ------+| ?15-29 ? ? ?                           



                          ? ? ? ? ?| ?Stage four                           



                          ? ? ? ? | ? Stage four                           



                          ? ? ? ? ?                              



                          ?+--------------------                           



                          ---+------------------                           



                          ---+------------------                           



                          -------+| ?<15 (or                           



                          dialysis) ? ?| ?Stage                           



                          five ? ? ? ? | ? Stage                           



                          five ? ? ? ? ?                           



                          ?+--------------------                           



                          ---+------------------                           



                          ---+------------------                           



                          -------+ *Each stage                           



                          assumes the associated                           



                          GFR level has been in                           



                          effect for at least                           



                          three months. ?Stages                           



                          1 to 5, with or                           



                          without kidney                           



                          disease, indicate                           



                          chronic kidney                           



                          disease. Notes:                           



                          Determination of                           



                          stages one and two                           



                          (with eGFR                             



                          >59mL/min/1.73 m2)                           



                          requires estimation of                           



                          kidney damage for at                           



                          least three months as                           



                          defined by structural                           



                          or functional                           



                          abnormalities of the                           



                          kidney, manifested by                           



                          either:Pathological                           



                          abnormalities or                           



                          Markers of kidney                           



                          damage (including                           



                          abnormalities in the                           



                          composition of the                           



                          blood or urine or                           



                          abnormalities in                           



                          imaging tests).                           

 

             Lab Interpretation Abnormal                               



             (test code = 94439-1)                                        



[facility] WITH [id number] 22:51:57





             Test Item    Value        Reference Range Interpretation Comments

 

             WBC (test code =              See_Comment  H             [Automated



             2120-2)                                             message] The sy

stem



                                                                 which generated



                                                                 this result



                                                                 transmitted



                                                                 reference range

:



                                                                 4.20 - 10.70



                                                                 10*3/?L. The



                                                                 reference range

 was



                                                                 not used to



                                                                 interpret this



                                                                 result as



                                                                 normal/abnormal

.

 

             RBC (test code =              See_Comment                [Automated



             337-8)                                              message] The sy

stem



                                                                 which generated



                                                                 this result



                                                                 transmitted



                                                                 reference range

:



                                                                 4.26 - 5.52



                                                                 10*6/?L. The



                                                                 reference range

 was



                                                                 not used to



                                                                 interpret this



                                                                 result as



                                                                 normal/abnormal

.

 

             HGB (test code = 14.7 g/dL    12.2-16.4                 



             718-7)                                              

 

             HCT (test code = 43.7 %       38.4-49.3                 



             4544-3)                                             

 

             MCV (test code = 85.9 fL      81.7-95.6                 



             787-2)                                              

 

             MCH (test code = 28.9 pg      26.1-32.7                 



             785-6)                                              

 

             MCHC (test code = 33.6 g/dL    31.2-35.0                 



             786-4)                                              

 

             RDW-SD (test code = 42.4 fL      38.5-51.6                 



             04565-2)                                            

 

             RDW-CV (test code = 13.6 %       12.1-15.4                 



             788-0)                                              

 

             PLT (test code =              See_Comment  H             [Automated



             777-3)                                              message] The sy

stem



                                                                 which generated



                                                                 this result



                                                                 transmitted



                                                                 reference range

:



                                                                 150 - 328 10*3/

?L.



                                                                 The reference r

zhen



                                                                 was not used to



                                                                 interpret this



                                                                 result as



                                                                 normal/abnormal

.

 

             MPV (test code = 9.4 fL       9.8-13.0     L            



             68548-7)                                            

 

             NRBC/100 WBC (test              See_Comment                [Automat

ed



             code = [id number])                                        message] 

The system



                                                                 which generated



                                                                 this result



                                                                 transmitted



                                                                 reference range

:



                                                                 0.0 - 10.0 /100



                                                                 WBCs. The refer

ence



                                                                 range was not u

sed



                                                                 to interpret th

is



                                                                 result as



                                                                 normal/abnormal

.

 

             NRBC x10^3 (test code <0.01        See_Comment                [Auto

mated



             = [id number])                                        message] The s

ystem



                                                                 which generated



                                                                 this result



                                                                 transmitted



                                                                 reference range

:



                                                                 10*3/?L. The



                                                                 reference range

 was



                                                                 not used to



                                                                 interpret this



                                                                 result as



                                                                 normal/abnormal

.

 

             GRAN MAT (NEUT) % 76.4 %                                 



             (test code = 770-8)                                        

 

             IMM GRAN % (test code 0.40 %                                 



             = 1307692497)                                        

 

             LYMPH % (test code = 16.3 %                                 



             736-9)                                              

 

             MONO % (test code = 5.6 %                                  



             5905-5)                                             

 

             EOS % (test code = 1.0 %                                  



             713-8)                                              

 

             BASO % (test code = 0.3 %                                  



             706-2)                                              

 

             GRAN MAT x10^3(ANC) 8.81 10*3/uL 1.99-6.95    H            



             (test code =                                        



             3309346720)                                         

 

             IMM GRAN x10^3 (test 0.05 10*3/uL 0.00-0.06                 



             code = 7121660092)                                        

 

             LYMPH x10^3 (test code 1.88 10*3/uL 1.09-3.23                 



             = 731-0)                                            

 

             MONO x10^3 (test code 0.64 10*3/uL 0.36-1.02                 



             = 742-7)                                            

 

             EOS x10^3 (test code = 0.12 10*3/uL 0.06-0.53                 



             711-2)                                              

 

             BASO x10^3 (test code 0.03 10*3/uL 0.01-0.09                 



             = 704-7)                                            

 

             Lab Interpretation Abnormal                               



             (test code = 84936-4)                                        



[facility] 
01:01:33





             Test Item    Value        Reference Range Interpretation Comments

 

             SARS-CoV-2 Rapid ID NOW Not Detected Not Detected              



             (test code = 16614-2)                                        

 

             MAL (test code = MAL) ID NOW COVID-19 Assay                        

   



                          is an isothermal                           



                          nucleic acid                           



                          amplification test                           



                          intended for the                           



                          qualitative detection                           



                          of nucleic acid from                           



                          SARS-CoV-2 viral RNA                           



                          in nasopharyngeal (NP)                           



                          specimens. It is used                           



                          under Emergency Use                           



                          Authorization (EUA) by                           



                          FDA. The limit of                           



                          detection (LOD) of the                           



                          assay is 125 Genome                           



                          Equivalents/mL. A                           



                          positive result is                           



                          indicative of the                           



                          presence of SARS-CoV-2                           



                          RNA. ?Clinical                           



                          correlation with                           



                          patient history and                           



                          other diagnostic                           



                          information is                           



                          necessary to determine                           



                          patient infection                           



                          status. A negative                           



                          (Not Detected) result                           



                          does not preclude                           



                          SARS-CoV-2 infection.                           



                          In patients with                           



                          clinical symptoms and                           



                          other tests that are                           



                          consistent with                           



                          SARS-CoV-2 infection,                           



                          negative results                           



                          should be treated as                           



                          presumptive negative                           



                          and a new specimen                           



                          should be tested with                           



                          alternative PCR                           



                          molecular test.                           



                          Invalid: Please                           



                          collect a new specimen                           



                          for repeat patient                           



                          testing if clinically                           



                          indicated.                             

 

             Lab Interpretation Normal                                 



             (test code = 63668-3)                                        



[facility] 
01:00:33





             Test Item    Value        Reference Range Interpretation Comments

 

             NA (test code = 140 mmol/L   135-145                   



             0132670596)                                         

 

             K (test code = 4.4 mmol/L   3.5-5.0                   



             1093160305)                                         

 

             CL (test code = 103 mmol/L                       



             8365069909)                                         

 

             CO2 TOTAL (test code = 28 mmol/L    23-31                     



             3930773677)                                         

 

             AGAP (test code =              2-16                      



             9777687892)                                         

 

             BUN (test code = 15 mg/dL     7-23                      



             5384642011)                                         

 

             GLUCOSE (test code = 85 mg/dL                         



             9689923960)                                         

 

             CREATININE (test code = 0.60 mg/dL   0.60-1.25                 



             7112266094)                                         

 

             TOTAL BILI (test code = 1.1 mg/dL    0.1-1.1                   



             3778536159)                                         

 

             CALCIUM (test code = 9.0 mg/dL    8.6-10.6                  



             6632739720)                                         

 

             T PROTEIN (test code = 7.8 g/dL     6.3-8.2                   



             9097606709)                                         

 

             ALBUMIN (test code = 4.0 g/dL     3.5-5.0                   



             1628490799)                                         

 

             ALK PHOS (test code = 166 U/L             H            



             7731895540)                                         

 

             ALTv (test code = 42 U/L       5-50                      



             1742-6)                                             

 

             AST(SGOT) (test code = 32 U/L       13-40                     



             6439069833)                                         

 

             eGFR (test code =              mL/min/1.73m2              



             7677475188)                                         

 

             MAL (test code = MAL) Association of                           



                          Glomerular Filtration                           



                          Rate (GFR) and Staging                           



                          of Kidney Disease*                           



                          +---------------------                           



                          --+-------------------                           



                          --+-------------------                           



                          ------+| GFR                           



                          (mL/min/1.73 m2) ?|                           



                          With Kidney Damage ?|                           



                          ?Without Kidney                           



                          Damage+---------------                           



                          --------+-------------                           



                          --------+-------------                           



                          ------------+| ?>90 ?                           



                          ? ? ? ? ? ? ? ?|                           



                          ?Stage one ? ? ? ? ?|                           



                          ? Normal ? ? ? ? ? ? ?                           



                          ?+--------------------                           



                          ---+------------------                           



                          ---+------------------                           



                          -------+| ?60-89 ? ? ?                           



                          ? ? ? ? ?| ?Stage two                           



                          ? ? ? ? ?| ? Decreased                           



                          GFR ? ? ? ?                            



                          +---------------------                           



                          --+-------------------                           



                          --+-------------------                           



                          ------+| ?30-59 ? ? ?                           



                          ? ? ? ? ?| ?Stage                           



                          three ? ? ? ?| ? Stage                           



                          three ? ? ? ? ?                           



                          +---------------------                           



                          --+-------------------                           



                          --+-------------------                           



                          ------+| ?15-29 ? ? ?                           



                          ? ? ? ? ?| ?Stage four                           



                          ? ? ? ? | ? Stage four                           



                          ? ? ? ? ?                              



                          ?+--------------------                           



                          ---+------------------                           



                          ---+------------------                           



                          -------+| ?<15 (or                           



                          dialysis) ? ?| ?Stage                           



                          five ? ? ? ? | ? Stage                           



                          five ? ? ? ? ?                           



                          ?+--------------------                           



                          ---+------------------                           



                          ---+------------------                           



                          -------+ *Each stage                           



                          assumes the associated                           



                          GFR level has been in                           



                          effect for at least                           



                          three months. ?Stages                           



                          1 to 5, with or                           



                          without kidney                           



                          disease, indicate                           



                          chronic kidney                           



                          disease. Notes:                           



                          Determination of                           



                          stages one and two                           



                          (with eGFR                             



                          >59mL/min/1.73 m2)                           



                          requires estimation of                           



                          kidney damage for at                           



                          least three months as                           



                          defined by structural                           



                          or functional                           



                          abnormalities of the                           



                          kidney, manifested by                           



                          either:Pathological                           



                          abnormalities or                           



                          Markers of kidney                           



                          damage (including                           



                          abnormalities in the                           



                          composition of the                           



                          blood or urine or                           



                          abnormalities in                           



                          imaging tests).                           

 

             Lab Interpretation Abnormal                               



             (test code = 80127-9)                                        



[facility]





             Test Item    Value        Reference Range Interpretation Comments

 

             LIPASE (test code = 3645546823) 57 U/L       0-220                 

    

 

             Lab Interpretation (test code = Normal                             

    



             64434-5)                                            



[facility]





             Test Item    Value        Reference Range Interpretation Comments

 

             APPEARANCE (test code = Turbid       Clear        A            



             7648930093)                                         

 

             COLOR (test code = Yellow       Yellow                    



             5483423302)                                         

 

             PH (test code =              4.8-8.0                   



             0296694044)                                         

 

             SP GRAVITY (test code =              1.003-1.030               



             4614795647)                                         

 

             GLU U QUAL (test code = Normal       Normal                    



             5225750859)                                         

 

             BLOOD (test code = 1+           Negative     A            



             8450874388)                                         

 

             KETONES (test code = 20 mg/dL     Negative     A            



             5684351299)                                         

 

             PROTEIN (test code = 100 mg/dL    Negative     A            



             2887-8)                                             

 

             UROBILIN (test code = 2.0 mg/dL    Normal       A            



             7667010221)                                         

 

             BILIRUBIN (test code = Negative     Negative                  



             8276696145)                                         

 

             NITRITE (test code = Positive     Negative     A            



             8932980757)                                         

 

             LEUK FRANCE (test code = 250/uL       Negative     A            



             2225287062)                                         

 

             RBC/HPF (test code =              See_Comment  H             [Autom

ated message]



             9260107005)                                         The system [employer]



                                                                 generated this



                                                                 result transmit

huma



                                                                 reference range

: 0 -



                                                                 3 HPF. The refe

rence



                                                                 range was not u

sed



                                                                 to interpret th

is



                                                                 result as



                                                                 normal/abnormal

.

 

             WBC/HPF (test code = >182         See_Comment  H             [Autom

ated message]



             0333887281)                                         The system [employer]



                                                                 generated this



                                                                 result transmit

huma



                                                                 reference range

: 0 -



                                                                 5 HPF. The refe

rence



                                                                 range was not u

sed



                                                                 to interpret th

is



                                                                 result as



                                                                 normal/abnormal

.

 

             BACTERIA (test code = Many         Negative     A            



             8067905948)                                         

 

             MUCOUS (test code = Moderate     Negative LPF A            



             2248918423)                                         

 

             WBC CLUMPS (test code =              See_Comment  H             [Au

tomated message]



             9232674532)                                         The system [employer]



                                                                 generated this



                                                                 result transmit

huma



                                                                 reference range

: <=1



                                                                 HPF. The refere

nce



                                                                 range was not u

sed



                                                                 to interpret th

is



                                                                 result as



                                                                 normal/abnormal

.

 

             Lab Interpretation (test Abnormal                               



             code = 60091-7)                                        



[facility]





             Test Item    Value        Reference Range Interpretation Comments

 

             WBC (test code =              See_Comment                [Automated



             6690-2)                                             message] The sy

stem



                                                                 which generated



                                                                 this result



                                                                 transmitted



                                                                 reference range

:



                                                                 4.20 - 10.70



                                                                 10*3/?L. The



                                                                 reference range

 was



                                                                 not used to



                                                                 interpret this



                                                                 result as



                                                                 normal/abnormal

.

 

             RBC (test code =              See_Comment                [Automated



             789-8)                                              message] The sy

stem



                                                                 which generated



                                                                 this result



                                                                 transmitted



                                                                 reference range

:



                                                                 4.26 - 5.52



                                                                 10*6/?L. The



                                                                 reference range

 was



                                                                 not used to



                                                                 interpret this



                                                                 result as



                                                                 normal/abnormal

.

 

             HGB (test code = 15.9 g/dL    12.2-16.4                 



             718-7)                                              

 

             HCT (test code = 47.1 %       38.4-49.3                 



             4544-3)                                             

 

             MCV (test code = 86.6 fL      81.7-95.6                 



             787-2)                                              

 

             MCH (test code = 29.2 pg      26.1-32.7                 



             785-6)                                              

 

             MCHC (test code = 33.8 g/dL    31.2-35.0                 



             786-4)                                              

 

             RDW-SD (test code = 47.6 fL      38.5-51.6                 



             43754-2)                                            

 

             RDW-CV (test code = 15.2 %       12.1-15.4                 



             788-0)                                              

 

             PLT (test code =              See_Comment  H             [Automated



             777-3)                                              message] The sy

stem



                                                                 which generated



                                                                 this result



                                                                 transmitted



                                                                 reference range

:



                                                                 150 - 328 10*3/

?L.



                                                                 The reference r

zhen



                                                                 was not used to



                                                                 interpret this



                                                                 result as



                                                                 normal/abnormal

.

 

             MPV (test code = 9.4 fL       9.8-13.0     L            



             38360-9)                                            

 

             NRBC/100 WBC (test              See_Comment                [Automat

ed



             code = 0637138348)                                        message] 

The system



                                                                 which generated



                                                                 this result



                                                                 transmitted



                                                                 reference range

:



                                                                 0.0 - 10.0 /100



                                                                 WBCs. The refer

ence



                                                                 range was not u

sed



                                                                 to interpret th

is



                                                                 result as



                                                                 normal/abnormal

.

 

             NRBC x10^3 (test code <0.01        See_Comment                [Auto

mated



             = [id number])                                        message] The s

ystem



                                                                 which generated



                                                                 this result



                                                                 transmitted



                                                                 reference range

:



                                                                 10*3/?L. The



                                                                 reference range

 was



                                                                 not used to



                                                                 interpret this



                                                                 result as



                                                                 normal/abnormal

.

 

             GRAN MAT (NEUT) % 61.3 %                                 



             (test code = 770-8)                                        

 

             IMM GRAN % (test code 0.70 %                                 



             = [id number])                                        

 

             LYMPH % (test code = 26.4 %                                 



             736-9)                                              

 

             MONO % (test code = 9.9 %                                  



             5905-5)                                             

 

             EOS % (test code = 1.3 %                                  



             713-8)                                              

 

             BASO % (test code = 0.4 %                                  



             706-2)                                              

 

             GRAN MAT x10^3(ANC) 6.39 10*3/uL 1.99-6.95                 



             (test code =                                        



             8202466355)                                         

 

             IMM GRAN x10^3 (test 0.07 10*3/uL 0.00-0.06    H            



             code = 3832350450)                                        

 

             LYMPH x10^3 (test code 2.75 10*3/uL 1.09-3.23                 



             = 731-0)                                            

 

             MONO x10^3 (test code 1.03 10*3/uL 0.36-1.02    H            



             = 742-7)                                            

 

             EOS x10^3 (test code = 0.14 10*3/uL 0.06-0.53                 



             711-2)                                              

 

             BASO x10^3 (test code 0.04 10*3/uL 0.01-0.09                 



             = 704-7)                                            

 

             Lab Interpretation Abnormal                               



             (test code = 58856-1)                                        



[id number] [facility]03:00





             Test Item    Value        Reference Range Interpretation Comments

 

             POC-GLUCOSE METER 140 mg/dL           H            : TESTED A

T [facility] [address]



             (BEAKER) (test code =                                        [facility],



             153)                                               [address]:



                                                                 /Techni

christina ID



                                                                 = [id number] for ANANT CATES



POCT-GLUCOSE [id number] 09:15:00





             Test Item    Value        Reference Range Interpretation Comments

 

             POC-GLUCOSE METER 142 mg/dL           H            : TESTED A

T [facility] [address]



             (BEAKER) (test code =                                        [facility],



             153)                                               [address]:



                                                                 /Techni

christina ID



                                                                 = [id number] for ANANT CATES



POCT-GLUCOSE METER2020-01-15 20:56:00





             Test Item    Value        Reference Range Interpretation Comments

 

             POC-GLUCOSE METER 134 mg/dL           H            : TESTED A

T [facility] [address]



             (BEAKER) (test code =                                        [facility],



             153)                                               [address]:



                                                                 /Techni

christina ID



                                                                 = [id number] for SHERRILL GUARDADO



POCT-GLUCOSE METER2020-01-15 16:46:00





             Test Item    Value        Reference Range Interpretation Comments

 

             POC-GLUCOSE METER 91 mg/dL                         : TESTED A

T [facility] [address]



             (BEAKER) (test code =                                        MILY NELSON [facility],



             1538)                                               [address]:



                                                                 /Techni

christina ID =



                                                                 [id number] for ANANT HAMILTON



POCT-GLUCOSE METER2020-01-15 12:19:00





             Test Item    Value        Reference Range Interpretation Comments

 

             POC-GLUCOSE METER 237 mg/dL           H            : TESTED A

T [facility] [address]



             (BEAKER) (test code =                                        MILY NELSON [facility],



             1538)                                               [address]:



                                                                 /Techni

christina ID



                                                                 = [id number] for ANANT CATES



MR, EXTREMITY, LOWER, WITHOUT CONTRAST, RIGHT2020-01-15 09:12:00FINAL REPORT 
PATIENT ID: [id number] MRI of the right and left hindfoot without intravenous 
contrast History: Osteomyelitis, diabetic foot Comparison: Radiograph dated 
2020 Technique: Multiplanar multisequence MRI of the right and left 
hindfoot was performed without intravenous contrast using the hindfoot 
osteomyelitis protocol Findings: Right foot: Marked T1 and T2 hypointense soft 
tissue thickening is noted at the medial aspect of the right heel, likely to 
reflect scar tissue. There is also mild subcutaneous edema at the lateral aspect
of the mid foot and forefoot, incompletely imaged. Nodiscrete drainable fluid 
collection is identified. There is no marrow signal abnormality to suggest o
steomyelitis. There is a small nonspecific joint effusion at the ankle and 
subtalar joint. Scattereddegenerative changes are noted. There is marked fatty 
atrophy of the distal leg and foot musculature. Severe flexor hallucis longus 
tendinopathy. No tenosynovitis. Left foot: There is a large area of soft tissue 
defect and granulation tissue at the posteromedial aspect of the heel, reaching 
the calcaneus, but there is no drainable fluid collection. Deformity is noted in
the hindfoot, and the forefootappears adducted. No acute fracture. No marrow 
signal abnormality is identified to indicate acute osteomyelitis. There is no 
significant joint effusion. There is severe atrophy of the foot and distal leg 
musculature. No significant tenosynovitis identified. IMPRESSION: 
Granulation/scar tissue at the medial aspect of the heel bilaterally. No MR 
evidence of osteomyelitis. Severe muscle atrophy. Signed:Jorge Cornejo 
Verified Date/Time: 01/15/2020 09:12:01 Reading Location: [facility] C0X Ortho 
Consult Reading Room Electronically signed by: JORGE CORNEJO M.D. on 01/15/2020 
09:12 AMMR, EXTREMITY, LOWER, WITHOUT CONTRAST, LEFT2020-01-15 09:12:00FINAL 
REPORT PATIENT ID: [id number] MRI of the right and left hindfoot without 
intravenous contrast History: Osteomyelitis, diabetic foot Comparison: 
Radiograph dated 2020 Technique: Multiplanar multisequence MRI of the
right and left hindfoot was performed without intravenous contrast using the 
hindfoot osteomyelitis protocol Findings: Right foot: Marked T1 and T2 
hypointense soft tissue thickening is noted at the medial aspect of the right 
heel, likely to reflect scar tissue. There is also mild subcutaneous edema at 
the lateral aspect of the mid foot and forefoot, incompletely imaged. Nodiscrete
drainable fluid collection is identified. There is no marrow signal abnormality 
to suggest osteomyelitis. There is a small nonspecific joint effusion at the 
ankle and subtalar joint. Scattereddegenerative changes are noted. There is 
marked fatty atrophy of the distal leg and foot musculature. Severe flexor 
hallucis longus tendinopathy. No tenosynovitis. Left foot: There is a large area
of soft tissue defect and granulation tissue at the posteromedial aspect of the 
heel, reaching the calcaneus, but there is no drainable fluid collection. 
Deformity is noted in the hindfoot, and the forefootappears adducted. No acute 
fracture. No marrow signal abnormality is identified to indicate acute ost
eomyelitis. There is no significant joint effusion. There is severe atrophy of 
the foot and distal leg musculature. No significant tenosynovitis identified. 
IMPRESSION: Granulation/scar tissue at the medial aspect of the heel 
bilaterally. No MR evidence of osteomyelitis. Severe muscle atrophy. Signed:Jorge Cornejoeport Verified Date/Time: 01/15/2020 09:12:01 Reading Location: [facility] 
C013X Ortho Consult Reading Room Electronically signed by: JORGE CORNEJO M.D. on 
01/15/2020 09:12 AMPOCT-GLUCOSE METER2020-01-15 08:47:00





             Test Item    Value        Reference Range Interpretation Comments

 

             POC-GLUCOSE METER 264 mg/dL           H            : TESTED A

T [facility] [address]



             ([facility]) (test code =                                        [facility],



             153)                                               [address]:



                                                                 /Techni

christina ID



                                                                 = [id number] for ANANT CATES



ISLET CELL AB SCR2020-01-15 07:43:00





             Test Item    Value        Reference Range Interpretation Comments

 

             ISLET CELL AB Refer to individual                           



             AUTOVERIFICATION (test Islet Cell Ab                           



             code = 2556) and/or Islet Cell                           



                          Ab Titer results.                           



POCT-GLUCOSE [id number] 21:27:00





             Test Item    Value        Reference Range Interpretation Comments

 

             POC-GLUCOSE METER 130 mg/dL           H            : [facility]ED A

[facility]



             ([facility]) (test code =                                        [facility],



             153)                                               [address]:



                                                                 /Techni

christina ID



                                                                 = [id number] for KRANTHI PIERRE



BLOOD [id number] 13:00:00





             Test Item    Value        Reference Range Interpretation Comments

 

             CULTURE (BEAKER) (test No growth in 5 days                         

  



             code = 1095)                                        



BLOOD [id number] 13:00:00





             Test Item    Value        Reference Range Interpretation Comments

 

             CULTURE (BEAKER) (test No growth in 5 days                         

  



             code = 1095)                                        



POCT-GLUCOSE [id number] 12:57:00





             Test Item    Value        Reference Range Interpretation Comments

 

             POC-GLUCOSE METER 138 mg/dL           H            : TESTED A

[facility]



             ([facility]) (test code =                                        [facility],



             153)                                               [address]:



                                                                 /Techni

christina ID



                                                                 = [id number] for [place]

LLIS,



                                                                 BARBARA



POCT-GLUCOSE [id number] 08:43:00





             Test Item    Value        Reference Range Interpretation Comments

 

             POC-GLUCOSE METER 226 mg/dL           H            : TESTED A

[facility]



             ([facility]) (test code =                                        [facility],



             153)                                               [address]:



                                                                 /Techni

christina ID



                                                                 = [id number] for [place]

LLIS,



                                                                 BARBARA



POCT-GLUCOSE [id number] 21:11:00





             Test Item    Value        Reference Range Interpretation Comments

 

             POC-GLUCOSE METER 254 mg/dL           H            : Notified

 RN/MD:



             ([facility]) (test code =                                        TESTED

 AT [place] [address]



             153)                                               [facility],



                                                                 [address]:



                                                                 /Techni

christina ID



                                                                 = [id number] for KRANTHI PIERRE



POCT-GLUCOSE [id number] 21:02:00





             Test Item    Value        Reference Range Interpretation Comments

 

             POC-GLUCOSE METER 177 mg/dL           H            : TESTED A

T [facility] [address]



             (BEAKER) (test code =                                        MILY NELSON [facility],



             1538)                                               [address]:



                                                                 /Techni

christina ID



                                                                 = [id number] for [place]

LLIS,



                                                                 BARBARA



POCT-GLUCOSE [id number] 12:31:00





             Test Item    Value        Reference Range Interpretation Comments

 

             POC-GLUCOSE METER 215 mg/dL           H            : TESTED A

T [facility] [address]



             (BEAKER) (test code =                                        MILY NELSON [facility],



             1538)                                               [address]:



                                                                 /Techni

christina ID



                                                                 = [id number] for [place]

LLIS,



                                                                 BARBARA



WOUND CULTURE + GRAM [id number] 10:10:00





             Test Item    Value        Reference    Interpretation Comments



                                       Range                     

 

             CULTURE (BEAKER) PROTEUS MIRABILIS              A            1+ Pro

teus



             (test code = 1095)                                        mirabilis

 

             Amikacin (test code =                           S            



             1)                                                  

 

             Ampicillin +                           S            



             Sulbactam (test code                                        



             = 6)                                                

 

             Aztreonam (test code                           S            



             = 32)                                               

 

             Cefepime (test code =                           S            



             51)                                                 

 

             Cefoxitin (test code                           R            



             = 68)                                               

 

             Ceftazidime (test                           S            



             code = 27)                                          

 

             Ceftriaxone (test                           S            



             code = 52)                                          

 

             Ertapenem (test code                           S            



             = 38)                                               

 

             Gentamicin (test code                           S            



             = 18)                                               

 

             Levofloxacin (test                           R            



             code = 22)                                          

 

             Meropenem (test code                           S            



             = 34)                                               

 

             Piperacillin +                           S            



             Tazobactam (test code                                        



             = 29)                                               

 

             Tetracycline (test                           R            



             code = 2)                                           

 

             Tobramycin (test code                           S            



             = 25)                                               

 

             Trimethoprim +                           R            



             Sulfamethoxazole                                        



             (test code = 47)                                        

 

             CULTURE (BEAKER) METHICILLIN               A            1+ Methicil

bryn



             (test code = 1095) RESISTANT                              resistant



                          STAPHYLOCOCCUS                           Staphylococcu

s



                          AUREUS                                 aureus

 

             Clindamycin (test                           R            



             code = 10)                                          

 

             Erythromycin (test                           R            



             code = 4)                                           

 

             Linezolid (test code                           S            



             = 40)                                               

 

             Nitrofurantoin (test                           S            



             code = 23)                                          

 

             Oxacillin (test code                           R            



             = 14)                                               

 

             Rifampin (test code =                           S            



             43)                                                 

 

             Tetracycline (test                           S            



             code = 2)                                           

 

             Trimethoprim +                           S            



             Sulfamethoxazole                                        



             (test code = 47)                                        

 

             Vancomycin (test code                           S            



             = 13)                                               

 

             CULTURE (BEAKER) ESCHERICHIA COLI              A            <1+ Esc

herichia



             (test code = 1095)                                        coliESBL 

Positive

 

             Amikacin (test code =                           S            



             1)                                                  

 

             Ampicillin +                           R            



             Sulbactam (test code                                        



             = 6)                                                

 

             Aztreonam (test code                           R            



             = 32)                                               

 

             Cefepime (test code =                           R            



             51)                                                 

 

             Cefoxitin (test code                           R            



             = 68)                                               

 

             Ceftazidime (test                           R            



             code = 27)                                          

 

             Ceftriaxone (test                           R            



             code = 52)                                          

 

             Ertapenem (test code                           S            



             = 38)                                               

 

             Gentamicin (test code                           S            



             = 18)                                               

 

             Levofloxacin (test                           R            



             code = 22)                                          

 

             Meropenem (test code                           S            



             = 34)                                               

 

             Nitrofurantoin (test                           S            



             code = 23)                                          

 

             Piperacillin +                           R            



             Tazobactam (test code                                        



             = 29)                                               

 

             Tetracycline (test                           S            



             code = 2)                                           

 

             Tobramycin (test code                           S            



             = 25)                                               

 

             Trimethoprim +                           R            



             Sulfamethoxazole                                        



             (test code = 47)                                        

 

             CULTURE (BEAKER)                           A            Beta-hemoly

tic



             (test code = 1095)                                        streptoco

ccus



                                                                 group B, by



                                                                 serological



                                                                 grouping

 

             GRAM STAIN RESULT 3+ WBCs                                



             (BEAKER) (test code =                                        



             1123)                                               

 

             GRAM STAIN RESULT 1+ gram negative                           



             (BEAKER) (test code = rods                                   



             652988)                                             

 

             GRAM STAIN RESULT 2+ gram positive                           



             (BEAKER) (test code = rods                                   



             624962)                                             

 

             GRAM STAIN RESULT <1+ gram negative                           



             (BEAKER) (test code = coccobacilli                           



             205008)                                             

 

             GRAM STAIN RESULT 2+ gram positive                           



             (BEAKER) (test code = cocci in pairs                           



             519740)                                             



POCT-GLUCOSE [id number] 08:52:00





             Test Item    Value        Reference Range Interpretation Comments

 

             POC-GLUCOSE METER 209 mg/dL           H            : TESTED A

T [facility] [address]



             ([employer]) (test code =                                        [facility],



             153)                                               [address]:



                                                                 /Techni

christina ID



                                                                 = [id number] for [place]

LLNIURKA,



                                                                 BARBARA



POCT-GLUCOSE [id number] 21:26:00





             Test Item    Value        Reference Range Interpretation Comments

 

             POC-GLUCOSE METER 255 mg/dL           H            : TESTED A

T [facility] [address]



             (BEAKER) (test code =                                        [facility],



             153)                                               [address]:



                                                                 /Techni

christina ID



                                                                 = [id number] for CR

RADHA,



                                                                 TIA



POCT-GLUCOSE [id number] 17:34:00





             Test Item    Value        Reference Range Interpretation Comments

 

             POC-GLUCOSE METER 227 mg/dL           H            : TESTED A

T [facility] [address]



             (BEAKER) (test code =                                        [facility],



             [facility])                                               [address]:



                                                                 /Techni

christina ID



                                                                 = [id number] for WASSERMAN

DALTON,



                                                                 NAKITA



POCT-GLUCOSE [id number] 11:28:00





             Test Item    Value        Reference Range Interpretation Comments

 

             POC-GLUCOSE METER 282 mg/dL           H            : TESTED A

T [facility] [address]



             (BEAKER) (test code =                                        [facility],



             [facility])                                               [address]:



                                                                 /Techni

christina ID



                                                                 = [id number] for WASSERMAN

DALTON,



                                                                 NAKITA



POCT-GLUCOSE [id number] 08:19:00





             Test Item    Value        Reference Range Interpretation Comments

 

             POC-GLUCOSE METER 329 mg/dL           H            : TESTED A

T [facility] [address]



             (BEAKER) (test code =                                        [facility],



             [facility])                                               [address]:



                                                                 /Techni

christina ID



                                                                 = [id number] for ANANT CATES



POCT-GLUCOSE [id number] 21:32:00





             Test Item    Value        Reference Range Interpretation Comments

 

             POC-GLUCOSE METER 275 mg/dL           H            : TESTED A

T [facility] [address]



             (BEAKER) (test code =                                        [facility],



             [facility])                                               [address]:



                                                                 /Techni

christina ID



                                                                 = [id number] for SHERRILL GUARDADO



POCT-GLUCOSE [id number] 17:47:00





             Test Item    Value        Reference Range Interpretation Comments

 

             POC-GLUCOSE METER 304 mg/dL           H            : TESTED A

T [facility] [address]



             (BEAKER) (test code =                                        [facility],



             [facility])                                               [address]:



                                                                 /Techni

christina ID



                                                                 = [id number] for MA

SAVAGE,



                                                                 LUIZA



URINE [id number] 15:21:00





             Test Item    Value        Reference Range Interpretation Comments

 

             [facility]                           A            >100,000 co

l/mL



             (test code = 1095)                                        Beta-hemo

lytic



                                                                 streptococcus g

roup



                                                                 B, by serologic

al



                                                                 grouping

 

             [facility] PROTEUS                   A            80-89,000 c

ol/mL



             (test code = 1095) MIRABILIS                              Proteus



                                                                 mirabilisESBL



                                                                 Positive

 

             Amikacin (test code =                           S            



             1)                                                  

 

             Ampicillin +                           R            



             Sulbactam (test code                                        



             = 6)                                                

 

             Aztreonam (test code                           R            



             = 32)                                               

 

             Cefepime (test code =                           R            



             51)                                                 

 

             Cefoxitin (test code                           R            



             = 68)                                               

 

             Ceftazidime (test                           R            



             code = 27)                                          

 

             Ceftriaxone (test                           R            



             code = 52)                                          

 

             Ertapenem (test code                           S            



             = 38)                                               

 

             Gentamicin (test code                           R            



             = 18)                                               

 

             Levofloxacin (test                           R            



             code = 22)                                          

 

             Meropenem (test code                           S            



             = 34)                                               

 

             Nitrofurantoin (test                           R            



             code = 23)                                          

 

             Tetracycline (test                           R            



             code = 2)                                           

 

             Tobramycin (test code                           R            



             = 25)                                               



POCT-GLUCOSE [id number] 12:16:00





             Test Item    Value        Reference Range Interpretation Comments

 

             POC-GLUCOSE METER 337 mg/dL           H            : TESTED A

T [facility] [address]



             (BEAKER) (test code =                                        [facility]

[employer] [facility],



             [address]38)                                               [address]:



                                                                 /Techni

christina ID



                                                                 = [id number] for LUIZA FIGUEROA



BASIC METABOLIC [id number] 10:39:00





             Test Item    Value        Reference Range Interpretation Comments

 

             SODIUM (BEAKER) 134 meq/L    136-145      L            



             (test code = 381)                                        

 

             POTASSIUM (BEAKER) 4.6 meq/L    3.5-5.1                   



             (test code = 379)                                        

 

             CHLORIDE (BEAKER) 105 meq/L                        



             (test code = 382)                                        

 

             CO2 (BEAKER) (test 20 meq/L     22-29        L            



             code = 355)                                         

 

             BLOOD UREA NITROGEN 14 mg/dL     7-21                      



             (BEAKER) (test code                                        



             = 354)                                              

 

             CREATININE (BEAKER) 0.97 mg/dL   0.57-1.25                 



             (test code = 358)                                        

 

             GLUCOSE RANDOM 429 mg/dL           HH           



             (BEAKER) (test code                                        



             = 652)                                              

 

             CALCIUM (BEAKER) 8.9 mg/dL    8.4-10.2                  



             (test code = 697)                                        

 

             EGFR (BEAKER) (test 107 mL/min/1.73                           ESTIM

ATED GFR IS



             code = 1092) sq m                                   NOT AS ACCURATE

 AS



                                                                 CREATININE



                                                                 CLEARANCE IN



                                                                 PREDICTING



                                                                 GLOMERULAR



                                                                 FILTRATION RATE

.



                                                                 ESTIMATED GFR I

S



                                                                 NOT APPLICABLE 

FOR



                                                                 DIALYSIS PATIEN

TS.



 ID - MAGAN FPOCT-GLUCOSE [id number] 08:29:00





             Test Item    Value        Reference Range Interpretation Comments

 

             POC-GLUCOSE METER 395 mg/dL           H            : TESTED A

T [facility] [address]



             (BEAKER) (test code =                                        [facility]

[employer] [place],



             [address]38)                                               [address]:



                                                                 /Techni

christina ID



                                                                 = [id number] for LUIZA FIGUEROA



CBC W/PLT COUNT &amp; AUTO [id number] 06:40:00





             Test Item    Value        Reference Range Interpretation Comments

 

             WHITE BLOOD CELL COUNT (BEAKER) 7.2 K/ L     3.5-10.5              

    



             (test code = 775)                                        

 

             RED BLOOD CELL COUNT (BEAKER) 5.48 M/ L    4.63-6.08               

  



             (test code = 761)                                        

 

             HEMOGLOBIN (BEAKER) (test code = 15.1 GM/DL   13.7-17.5            

     



             410)                                                

 

             HEMATOCRIT (BEAKER) (test code = 46.4 %       40.1-51.0            

     



             411)                                                

 

             MEAN CORPUSCULAR VOLUME (BEAKER) 84.7 fL      79.0-92.2            

     



             (test code = 753)                                        

 

             MEAN CORPUSCULAR HEMOGLOBIN 27.6 pg      25.7-32.2                 



             (BEAKER) (test code = 751)                                        

 

             MEAN CORPUSCULAR HEMOGLOBIN CONC 32.5 GM/DL   32.3-36.5            

     



             (BEAKER) (test code = 752)                                        

 

             RED CELL DISTRIBUTION WIDTH 15.5 %       11.6-14.4    H            



             (BEAKER) (test code = 412)                                        

 

             PLATELET COUNT (BEAKER) (test 315 K/CU MM  150-450                 

  



             code = 756)                                         

 

             MEAN PLATELET VOLUME (BEAKER) 10.5 fL      9.4-12.4                

  



             (test code = 754)                                        

 

             NUCLEATED RED BLOOD CELLS 0 /100 WBC   0-0                       



             (BEAKER) (test code = 413)                                        

 

             NEUTROPHILS RELATIVE PERCENT 62 %                                  

 



             (BEAKER) (test code = 429)                                        

 

             LYMPHOCYTES RELATIVE PERCENT 26 %                                  

 



             (BEAKER) (test code = 430)                                        

 

             MONOCYTES RELATIVE PERCENT 9 %                                    



             (BEAKER) (test code = 431)                                        

 

             EOSINOPHILS RELATIVE PERCENT 2 %                                   

 



             (BEAKER) (test code = 432)                                        

 

             BASOPHILS RELATIVE PERCENT 0 %                                    



             (BEAKER) (test code = 437)                                        

 

             NEUTROPHILS ABSOLUTE COUNT 4.48 K/ L    1.78-5.38                 



             (BEAKER) (test code = 670)                                        

 

             LYMPHOCYTES ABSOLUTE COUNT 1.87 K/ L    1.32-3.57                 



             (BEAKER) (test code = 414)                                        

 

             MONOCYTES ABSOLUTE COUNT (BEAKER) 0.62 K/ L    0.30-0.82           

      



             (test code = 415)                                        

 

             EOSINOPHILS ABSOLUTE COUNT 0.17 K/ L    0.04-0.54                 



             (BEAKER) (test code = 416)                                        

 

             BASOPHILS ABSOLUTE COUNT (BEAKER) 0.03 K/ L    0.01-0.08           

      



             (test code = 417)                                        

 

             IMMATURE GRANULOCYTES-RELATIVE 1 %          0-1                    

   



             PERCENT (BEAKER) (test code =                                      

  



             2801)                                               



POCT-GLUCOSE [id number] 19:55:00





             Test Item    Value        Reference Range Interpretation Comments

 

             POC-GLUCOSE METER 369 mg/dL           H            : TESTED A

T [facility] [address]



             (BEAKER) (test code =                                        [facility],



             [address]38)                                               [address]:



                                                                 /Techni

christina ID



                                                                 = [id number] for SHERRILL GUARDADO



POCT-GLUCOSE [id number] 16:45:00





             Test Item    Value        Reference Range Interpretation Comments

 

             POC-GLUCOSE METER 272 mg/dL           H            : TESTED A

T [facility] [address]



             (BEAKER) (test code =                                        [facility],



             [address])                                               [address]:



                                                                 /Techni

christina ID



                                                                 = [id number] for SARAH VIDES



POCT-GLUCOSE [id number] 11:40:00





             Test Item    Value        Reference Range Interpretation Comments

 

             POC-GLUCOSE METER 277 mg/dL           H            : TESTED A

T [facility] [address]



             (BEAKER) (test code =                                        [facility],



             [address]38)                                               [address]:



                                                                 /Techni

christina ID



                                                                 = [id number] for SARAH VIDES



BASIC METABOLIC PANEL2020-01-10 10:22:00





             Test Item    Value        Reference Range Interpretation Comments

 

             SODIUM (BEAKER) 132 meq/L    136-145      L            



             (test code = 381)                                        

 

             POTASSIUM (BEAKER) 4.4 meq/L    3.5-5.1                   



             (test code = 379)                                        

 

             CHLORIDE (BEAKER) 103 meq/L                        



             (test code = 382)                                        

 

             CO2 (BEAKER) (test 21 meq/L     22-29        L            



             code = 355)                                         

 

             BLOOD UREA NITROGEN 14 mg/dL     7-21                      



             (BEAKER) (test code                                        



             = 354)                                              

 

             CREATININE (BEAKER) 0.89 mg/dL   0.57-1.25                 



             (test code = 358)                                        

 

             GLUCOSE RANDOM 428 mg/dL           HH           



             (BEAKER) (test code                                        



             = 652)                                              

 

             CALCIUM (BEAKER) 9.2 mg/dL    8.4-10.2                  



             (test code = 697)                                        

 

             EGFR (BEAKER) (test 119 mL/min/1.73                           ESTIM

ATED GFR IS



             code = 1092) sq m                                   NOT AS ACCURATE

 AS



                                                                 CREATININE



                                                                 CLEARANCE IN



                                                                 PREDICTING



                                                                 GLOMERULAR



                                                                 FILTRATION RATE

.



                                                                 ESTIMATED GFR I

S



                                                                 NOT APPLICABLE 

FOR



                                                                 DIALYSIS PATIEN

TS.



 ID - NTPLIPID PANEL2020-01-10 10:17:00





             Test Item    Value        Reference Range Interpretation Comments

 

             TRIGLYCERIDES (BEAKER) (test code = 692 mg/dL                      

        



             540)                                                

 

             CHOLESTEROL (BEAKER) (test code = 196 mg/dL                        

      



             631)                                                

 

             HDL CHOLESTEROL (BEAKER) (test code 24 mg/dL                       

        



             = 976)                                              



Calculated LDL not valid if triglyceride &gt;400 mg/dLTriglyceride Reference 
Range: Low Risk &lt;150Borderline 150-199 High Risk 200-499 Very High Risk 
&gt;=500Cholesterol Reference Range: Low Risk &lt;200 Borderline 200-239 High 
Risk &gt;240HDL Cholesterol Reference Range: Low Risk &gt;=60 High Risk&lt;40LDL
Cholesterol Reference Range: Optimal &lt;100 Near Optimal 100-129 Borderline 
130-159 High 160-189 Very High &gt;=190  ID - NTPPOCT-GLUCOSE METER
2020-01-10 08:28:00





             Test Item    Value        Reference Range Interpretation Comments

 

             POC-GLUCOSE METER 388 mg/dL           H            : TESTED A

T [facility] [address]



             (BEAKER) (test code =                                        [place],



             1538)                                               [id number]:



                                                                 /Techni

christina ID



                                                                 = [id number] for ANANT CATES



HEMOGLOBIN A1C2020-01-10 07:54:00





             Test Item    Value        Reference Range Interpretation Comments

 

             HEMOGLOBIN A1C (BEAKER) (test code = 10.4 %       4.3-6.1      H   

         



             368)                                                



CBC W/PLT COUNT &amp; AUTO DIFFERENTIAL2020-01-10 05:56:00





             Test Item    Value        Reference Range Interpretation Comments

 

             WHITE BLOOD CELL COUNT (BEAKER) 6.5 K/ L     3.5-10.5              

    



             (test code = 775)                                        

 

             RED BLOOD CELL COUNT (BEAKER) 5.21 M/ L    4.63-6.08               

  



             (test code = 761)                                        

 

             HEMOGLOBIN (BEAKER) (test code = 14.6 GM/DL   13.7-17.5            

     



             410)                                                

 

             HEMATOCRIT (BEAKER) (test code = 44.3 %       40.1-51.0            

     



             411)                                                

 

             MEAN CORPUSCULAR VOLUME (BEAKER) 85.0 fL      79.0-92.2            

     



             (test code = 753)                                        

 

             MEAN CORPUSCULAR HEMOGLOBIN 28.0 pg      25.7-32.2                 



             (BEAKER) (test code = 751)                                        

 

             MEAN CORPUSCULAR HEMOGLOBIN CONC 33.0 GM/DL   32.3-36.5            

     



             (BEAKER) (test code = 752)                                        

 

             RED CELL DISTRIBUTION WIDTH 15.6 %       11.6-14.4    H            



             (BEAKER) (test code = 412)                                        

 

             PLATELET COUNT (BEAKER) (test 294 K/CU MM  150-450                 

  



             code = 756)                                         

 

             MEAN PLATELET VOLUME (BEAKER) 11.2 fL      9.4-12.4                

  



             (test code = 754)                                        

 

             NUCLEATED RED BLOOD CELLS 0 /100 WBC   0-0                       



             (BEAKER) (test code = 413)                                        

 

             NEUTROPHILS RELATIVE PERCENT 56 %                                  

 



             (BEAKER) (test code = 429)                                        

 

             LYMPHOCYTES RELATIVE PERCENT 31 %                                  

 



             (BEAKER) (test code = 430)                                        

 

             MONOCYTES RELATIVE PERCENT 10 %                                   



             (BEAKER) (test code = 431)                                        

 

             EOSINOPHILS RELATIVE PERCENT 2 %                                   

 



             (BEAKER) (test code = 432)                                        

 

             BASOPHILS RELATIVE PERCENT 1 %                                    



             (BEAKER) (test code = 437)                                        

 

             NEUTROPHILS ABSOLUTE COUNT 3.66 K/ L    1.78-5.38                 



             (BEAKER) (test code = 670)                                        

 

             LYMPHOCYTES ABSOLUTE COUNT 2.03 K/ L    1.32-3.57                 



             (BEAKER) (test code = 414)                                        

 

             MONOCYTES ABSOLUTE COUNT (BEAKER) 0.63 K/ L    0.30-0.82           

      



             (test code = 415)                                        

 

             EOSINOPHILS ABSOLUTE COUNT 0.15 K/ L    0.04-0.54                 



             (BEAKER) (test code = 416)                                        

 

             BASOPHILS ABSOLUTE COUNT (BEAKER) 0.03 K/ L    0.01-0.08           

      



             (test code = 417)                                        

 

             IMMATURE GRANULOCYTES-RELATIVE 1 %          0-1                    

   



             PERCENT (BEAKER) (test code =                                      

  



             2801)                                               



POCT-GLUCOSE [id number] 23:47:00





             Test Item    Value        Reference Range Interpretation Comments

 

             POC-GLUCOSE METER 359 mg/dL           H            : Notified

 RN/MD:



             (KUN) (test code =                                        TESTED

 AT [facility] [address]



             1538)                                               [facility],



                                                                 [id number]:



                                                                 /Techni

christina ID



                                                                 = [id number] for



                                                                 ALESSIA HUIZAR



POCT-GLUCOSE [id number] 21:13:00





             Test Item    Value        Reference Range Interpretation Comments

 

             POC-GLUCOSE METER 315 mg/dL           H            : TESTED A

T [facility] [address]



             (BEAKER) (test code =                                        [facility],



             1538)                                               [id number]:



                                                                 /Techni

christina ID



                                                                 = [id number] for Sm

marga,



                                                                 Nicki



POCT-GLUCOSE [id number] 21:13:00





             Test Item    Value        Reference Range Interpretation Comments

 

             POC-GLUCOSE METER 331 mg/dL           H            : TESTED A

T [facility] [address]



             (BEAKER) (test code =                                        [facility],



             1538)                                               [id number]:



                                                                 /Techni

christina ID



                                                                 = [id number] for RO

DGCHANTALE,



                                                                 DAVIDECA



POCT-GLUCOSE [id number] 10:30:00





             Test Item    Value        Reference Range Interpretation Comments

 

             POC-GLUCOSE METER 341 mg/dL           H            : TESTED A

T [facility] [address]



             (BEAKER) (test code =                                        [facility],



             1538)                                               [address]:



                                                                 /Techni

christina ID



                                                                 = [id number] for Sm

ith,



                                                                 Nicki



CBC W/PLT COUNT &amp; AUTO [id number] 08:48:00





             Test Item    Value        Reference Range Interpretation Comments

 

             WHITE BLOOD CELL COUNT (BEAKER) 6.7 K/ L     3.5-10.5              

    



             (test code = 775)                                        

 

             RED BLOOD CELL COUNT (BEAKER) 5.28 M/ L    4.63-6.08               

  



             (test code = 761)                                        

 

             HEMOGLOBIN (BEAKER) (test code = 14.6 GM/DL   13.7-17.5            

     



             410)                                                

 

             HEMATOCRIT (BEAKER) (test code = 44.9 %       40.1-51.0            

     



             411)                                                

 

             MEAN CORPUSCULAR VOLUME (BEAKER) 85.0 fL      79.0-92.2            

     



             (test code = 753)                                        

 

             MEAN CORPUSCULAR HEMOGLOBIN 27.7 pg      25.7-32.2                 



             (BEAKER) (test code = 751)                                        

 

             MEAN CORPUSCULAR HEMOGLOBIN CONC 32.5 GM/DL   32.3-36.5            

     



             (BEAKER) (test code = 752)                                        

 

             RED CELL DISTRIBUTION WIDTH 15.3 %       11.6-14.4    H            



             (BEAKER) (test code = 412)                                        

 

             PLATELET COUNT (BEAKER) (test 300 K/CU MM  150-450                 

  



             code = 756)                                         

 

             MEAN PLATELET VOLUME (BEAKER) 10.4 fL      9.4-12.4                

  



             (test code = 754)                                        

 

             NUCLEATED RED BLOOD CELLS 0 /100 WBC   0-0                       



             (BEAKER) (test code = 413)                                        



(MANUAL DIFFERENTIAL)2020 08:48:00





             Test Item    Value        Reference Range Interpretation Comments

 

             NEUTROPHILS - REL (DIFF) (BEAKER) 69 %                             

      



             (test code = 1359)                                        

 

             LYMPHOCYTES - REL (DIFF) (BEAKER) 25 %                             

      



             (test code = 1360)                                        

 

             MONOCYTES - REL (DIFF) (BEAKER) 3 %                                

    



             (test code = 1361)                                        

 

             EOSINOPHILS - REL (DIFF) (BEAKER) 3 %                              

      



             (test code = 1362)                                        

 

             BASOPHILS - REL (DIFF) (BEAKER) 0 %                                

    



             (test code = 1363)                                        

 

             NEUTROPHILS - ABS (DIFF) (BEAKER) 4.62 K/ L    1.80-8.00           

      



             (test code = 1365)                                        

 

             LYMPHOCYTES - ABS (DIFF) (BEAKER) 1.68 K/ L    1.48-4.50           

      



             (test code = 1366)                                        

 

             MONOCYTES - ABS (DIFF) (BEAKER) 0.20 K/ L    0.00-1.30             

    



             (test code = 1367)                                        

 

             EOSINOPHILS - ABS (DIFF) (BEAKER) 0.20 K/ L    0.00-0.50           

      



             (test code = 1368)                                        

 

             BASOPHILS - ABS (DIFF) (BEAKER) 0.00 K/ L    0.00-0.20             

    



             (test code = 1369)                                        

 

             TOTAL COUNTED (BEAKER) (test code = 100                            

        



             1351)                                               

 

             PLT MORPHOLOGY (BEAKER) (test code Normal                          

       



             = 486)                                              

 

             RBC MORPHOLOGY (BEAKER) (test code Normal                          

       



             = 762)                                              

 

             SMUDGE CELLS (BEAKER) (test code = Present                         

       



             1371)                                               



HEMOGLOBIN [id number] 06:39:00





             Test Item    Value        Reference Range Interpretation Comments

 

             HEMOGLOBIN A1C (BEAKER) (test code = 10.5 %       4.3-6.1      H   

         



             368)                                                



LIPID [id number] 04:57:00





             Test Item    Value        Reference Range Interpretation Comments

 

             TRIGLYCERIDES (BEAKER) 1251 mg/dL                             Speci

men moderately



             (test code = 540)                                        hemolyzed

 

             CHOLESTEROL (BEAKER) 215 mg/dL                              Specime

n moderately



             (test code = 631)                                        hemolyzed

 

             HDL CHOLESTEROL 22 mg/dL                               



             (BEAKER) (test code =                                        



             976)                                                



Calculated LDL not valid if triglyceride &gt;400 mg/dLTriglyceride Reference 
Range: Low Risk &lt;150Borderline 150-199 High Risk 200-499 Very High Risk 
&gt;=500Cholesterol Reference Range: Low Risk &lt;200 Borderline 200-239 High 
Risk &gt;240HDL Cholesterol Reference Range: Low Risk &gt;=60 High Risk&lt;40LDL
Cholesterol Reference Range: Optimal  &lt;100 Near Optimal 100-129 Borderline 
130-159 Fbhl202-867 Very High &gt;=190 Specimen moderately lipemicBASIC 
METABOLIC [id number] 04:56:00





             Test Item    Value        Reference Range Interpretation Comments

 

             SODIUM (BEAKER) 137 meq/L    136-145                   



             (test code = 381)                                        

 

             POTASSIUM (BEAKER) 4.6 meq/L    3.5-5.1                   Specimen 

moderately



             (test code = 379)                                        hemolyzed

 

             CHLORIDE (BEAKER) 106 meq/L                        



             (test code = 382)                                        

 

             CO2 (BEAKER) (test 20 meq/L     22-29        L            



             code = 355)                                         

 

             BLOOD UREA NITROGEN 12 mg/dL     7-21                      



             (BEAKER) (test code                                        



             = 354)                                              

 

             CREATININE (BEAKER) 0.95 mg/dL   0.57-1.25                 Specimen

 moderately



             (test code = 358)                                        hemolyzed

 

             GLUCOSE RANDOM 398 mg/dL           H            



             (BEAKER) (test code                                        



             = 652)                                              

 

             CALCIUM (BEAKER) 8.8 mg/dL    8.4-10.2                  



             (test code = 697)                                        

 

             EGFR (BEAKER) (test 110 mL/min/1.73                           ESTIM

ATED GFR IS



             code = 1092) sq m                                   NOT AS ACCURATE

 AS



                                                                 CREATININE



                                                                 CLEARANCE IN



                                                                 PREDICTING



                                                                 GLOMERULAR



                                                                 FILTRATION RATE

.



                                                                 ESTIMATED GFR I

S



                                                                 NOT APPLICABLE 

FOR



                                                                 DIALYSIS PATIEN

TS.



POCT-GLUCOSE [id number] 21:53:00





             Test Item    Value        Reference Range Interpretation Comments

 

             POC-GLUCOSE METER > mg/dL             HH           : Notified

 RN/MD: TESTED



             (BEAKER) (test code =                                        AT [facility] [address] [facility]



             2027)                                               [facility], [address]

[address]:



                                                                 /Techni

christian ID =



                                                                 [id number] for ZECHARIAH TRACY



U/S, ABDOMINAL, [id number] 19:54:00Reason for exam:-&gt;Evaluation of  
shunt and for possible cyst or pseudocyst Should this be performed at the 
bedside?-&gt;YesFINAL REPORT PATIENT ID: [id number] Limited abdominal ultrasound. 
CLINICAL HISTORY: Evaluation of  shunt for possible cyst or pseudocyst. 
COMPARISON STUDY: None available. FINDINGS: Sonographic assessment of the 
abdomen was performed assessing for fluid in the region of the patient's shunts.
No fluid collections are seen. However, the study is limited by the patient's 
body habitus. CT scan would be more sensitive. Signed: Madison Grewal MDReport 
Verified Date/Time: 2020 19:54:10 Reading Location: [address] Consult 
Reading Room Electronically signed by: MADISON GREWAL M.D. on 2020 07:54 
PMPOCT-GLUCOSE [id number] 19:34:00





             Test Item    Value        Reference Range Interpretation Comments

 

             POC-GLUCOSE METER 474 mg/dL           HH           : Notified

 RN/MD:



             (BEAKER) (test code =                                        TESTED

 AT [facility] [address]



             1538)                                               [facility],



                                                                 [address]:



                                                                 /Techni

christina ID



                                                                 = [id number] for AK

LONA DUNNE



URINALYSIS W/ REFLEX URINE [id number] 17:48:00





             Test Item    Value        Reference Range Interpretation Comments

 

             COLOR (BEAKER) (test code = 470) Yellow                            

     

 

             CLARITY (BEAKER) (test code = Hazy                                 

  



             469)                                                

 

             SPECIFIC GRAVITY UA (BEAKER) 1.020        1.001-1.035              

 



             (test code = 468)                                        

 

             PH UA (BEAKER) (test code = 467) 6.0          5.0-8.0              

     

 

             PROTEIN UA (BEAKER) (test code = 20 mg/dL     Negative     A       

     



             464)                                                

 

             GLUCOSE UA (BEAKER) (test code = >1000 mg/dL  Negative     A       

     



             365)                                                

 

             KETONES UA (BEAKER) (test code = 40 mg/dL     Negative     A       

     



             371)                                                

 

             BILIRUBIN UA (BEAKER) (test code Negative     Negative             

     



             = 462)                                              

 

             BLOOD UA (BEAKER) (test code = Moderate     Negative     A         

   



             461)                                                

 

             NITRITE UA (BEAKER) (test code = Positive     Negative     A       

     



             465)                                                

 

             LEUKOCYTE ESTERASE UA (BEAKER) Large        Negative     A         

   



             (test code = 466)                                        

 

             UROBILINOGEN UA (BEAKER) (test 0.2 mg/dL    0.2-1.0                

   



             code = 463)                                         

 

             RBC UA (BEAKER) (test code = 519) 0 /HPF                           

      

 

             WBC UA (BEAKER) (test code = 520) 267 /HPF                         

      

 

             BACTERIA (BEAKER) (test code = Moderate                            

   



             517)                                                

 

             SOURCE(BEAKER) (test code = 2795)                                  

      



CBC W/PLT COUNT &amp; AUTO [id number] 17:38:00





             Test Item    Value        Reference Range Interpretation Comments

 

             WHITE BLOOD CELL COUNT (BEAKER) 7.8 K/ L     3.5-10.5              

    



             (test code = 775)                                        

 

             RED BLOOD CELL COUNT (BEAKER) 5.12 M/ L    4.63-6.08               

  



             (test code = 761)                                        

 

             HEMOGLOBIN (BEAKER) (test code = 14.7 GM/DL   13.7-17.5            

     



             410)                                                

 

             HEMATOCRIT (BEAKER) (test code = 43.3 %       40.1-51.0            

     



             411)                                                

 

             MEAN CORPUSCULAR VOLUME (BEAKER) 84.6 fL      79.0-92.2            

     



             (test code = 753)                                        

 

             MEAN CORPUSCULAR HEMOGLOBIN 28.7 pg      25.7-32.2                 



             (BEAKER) (test code = 751)                                        

 

             MEAN CORPUSCULAR HEMOGLOBIN CONC 33.9 GM/DL   32.3-36.5            

     



             (BEAKER) (test code = 752)                                        

 

             RED CELL DISTRIBUTION WIDTH 15.3 %       11.6-14.4    H            



             (BEAKER) (test code = 412)                                        

 

             PLATELET COUNT (BEAKER) (test 344 K/CU MM  150-450                 

  



             code = 756)                                         

 

             MEAN PLATELET VOLUME (BEAKER) 11.0 fL      9.4-12.4                

  



             (test code = 754)                                        

 

             NUCLEATED RED BLOOD CELLS 0 /100 WBC   0-0                       



             (BEAKER) (test code = 413)                                        



(CELLAVISION MANUAL DIFF)2020 17:38:00





             Test Item    Value        Reference Range Interpretation Comments

 

             NEUTROPHILS - REL 63 %                                   



             (CELLAVISION)(BEAKER) (test code                                   

     



             = 2816)                                             

 

             LYMPHOCYTES - REL 23 %                                   



             (CELLAVISION)(BEAKER) (test code                                   

     



             = 2817)                                             

 

             MONOCYTES - REL 6 %                                    



             (CELLAVISION)(BEAKER) (test code                                   

     



             = 2818)                                             

 

             EOSINOPHILS - REL 5 %                                    



             (CELLAVISION)(BEAKER) (test code                                   

     



             = 2819)                                             

 

             BASOPHILS - REL 1 %                                    



             (CELLAVISION)(BEAKER) (test code                                   

     



             = 2820)                                             

 

             BANDS - REL (CELLAVISION)(BEAKER) 2 %          0-10                

      



             (test code = 2826)                                        

 

             NEUTROPHILS - ABS 4.91 K/ul    1.78-5.38                 



             (CELLAVISION)(BEAKER) (test code                                   

     



             = 2830)                                             

 

             LYMPHOCYTES - ABS 1.79 K/ul    1.32-3.57                 



             (CELLAVISION)(BEAKER) (test code                                   

     



             = 2831)                                             

 

             MONOCYTES - ABS 0.47 K/uL    0.30-0.82                 



             (CELLAVISION)(BEAKER) (test code                                   

     



             = 2832)                                             

 

             EOSINOPHILS - ABS 0.39 K/uL    0.04-0.54                 



             (CELLAVISION)(BEAKER) (test code                                   

     



             = 2834)                                             

 

             BASOPHILS - ABS 0.08 K/uL    0.01-0.08                 



             (CELLAVISION)(BEAKER) (test code                                   

     



             = 2835)                                             

 

             BANDS - ABS (CELLAVISION)(BEAKER) 0.16 K/uL    0.00-0.80           

      



             (test code = 2840)                                        

 

             TOTAL COUNTED (BEAKER) (test code 100                              

      



             = 1351)                                             

 

             SMUDGE CELLS (BEAKER) (test code Present                           

     



             = 1371)                                             

 

             GIANT PLATELETS (BEAKER) (test Present                             

   



             code = 313)                                         

 

             ANISOCYTOSIS (BEAKER) (test code 1+ few                            

     



             = 961)                                              

 

             POIKILOCYTES (BEAKER) (test code 2+ moderate                       

     



             = 966)                                              

 

             SPHEROCYTES (BEAKER) (test code = 1+ few                           

      



             768)                                                

 

             OVALOCYTES (BEAKER) (test code = 1+ few                            

     



             477)                                                

 

             TEAR DROP CELLS (BEAKER) (test 1+ few                              

   



             code = 481)                                         

 

             ARTIFACT (CELLAVISION)(BEAKER) Present                             

   



             (test code = 3432)                                        

 

             PLATELET CONCENTRATION Adequate                               



             (CELLAVISION)(BEAKER) (test code                                   

     



             = 3438)                                             



Received comment: User comments: Slide comments:POCT-GLUCOSE [id number] 
16:27:00





             Test Item    Value        Reference Range Interpretation Comments

 

             POC-GLUCOSE METER 424 mg/dL           HH           : Notified

 RN/MD:



             (KUN) (test code =                                        TESTED

 AT [facility] 0807 3691)                                               [facility],



                                                                 [address]:



                                                                 /Techni

christina ID



                                                                 = [id number] for AK

SENAITFAMILIA



                                                                 LONA



CT, BRAIN, WITHOUT [id number] 15:31:00FINAL REPORT PATIENT ID: [id number] 
CT, BRAIN, WITHOUT CONTRAST CLINICAL INDICATION: Hydrocephalus COMPARISON: None 
TECHNIQUE: Noncontrast axial CT imaging of the brain and skull. DOSE REDUCTION: 
Dose modulation, iterative reconstruction, and/or weight-based adjustment of the
mA/kV was utilized to reduce the radiation dose to as low as reasonably 
achievable. FINDINGS:A total of two ventricular drainagecatheters are present. A
right transverse temporal catheter terminates across the midline just abovethe 
level of the foramen of Monro. A right parietal approach catheter terminates in 
the pineal region. Supratentorial ventricular configuration is slitlike. There 
is no herniation. The basilar cisternsare preserved. There is no identifiable 
recent infarct. 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 
partial callosal agenesis. Calvarial configuration escape of cephalic. There is 
no acute osseous abnormality. IMPRESSION: Chronic, likely congenital parenchymal
changes without recent infarct or hemorrhage. A total of two ventricular 
drainage catheters are present. Supratentorial ventricular configuration is 
slitlike and may represent over shunting. Correlation recommended. Signed:
JR Rae Robert MDReport Verified Date/Time: 2020 15:31:08 
Reading Location: [address] Neuro Reading Room Electronically signed by: 
ALONDRA RAE on 2020 03:31 PMRAD, SHUNT [id number] 
15:13:00Reason for exam:-&gt;concern for VPS malfunctionFINAL REPORT PATIENT ID:
[id number] RAD, SHUNT SERIES INDICATION: concern for VPS malfunction COMPARISON: 
None TECHNIQUE: AP and lateral radiographs of the skull, abdomen and chest were 
acquired to evaluate shunt catheter FINDINGS:An abandoned shunt catheter is 
present within the right neck. Medial to this a second shunt catheter is present
which descends along the left chest wall, terminating within the mid pelvis. 
Radiopaque portions of the catheter appear contiguous. Signed: Lisa Reyes Verified Date/Time: 2020 15:13:54 [facility]
Radiology Reading Room Electronically signed by: LISA REYES MD on
2020 03:13 PMPOCT-GLUCOSE [id number] 11:38:00





             Test Item    Value        Reference Range Interpretation Comments

 

             POC-GLUCOSE METER 394 mg/dL           H            : TESTED A

T [facility] [address]



             (BEAKER) (test code =                                        [place],



             [address]38)                                               [id number]:



                                                                 /Techni

christina ID



                                                                 = [id number] for LONA URIOSTEGUI



HEMOGLOBIN [id number] 09:15:00





             Test Item    Value        Reference Range Interpretation Comments

 

             HEMOGLOBIN A1C (BEAKER) (test code = 10.4 %       4.3-6.1      H   

         



             368)                                                



TSH/FREE T4 IF [id number] 07:54:00





             Test Item    Value        Reference Range Interpretation Comments

 

             THYROID STIMULATING HORMONE 1.40 uIU/mL  0.35-4.94                 



             (BEAKER) (test code = 772)                                        



POCT-GLUCOSE [id number] 07:48:00





             Test Item    Value        Reference Range Interpretation Comments

 

             POC-GLUCOSE METER > mg/dL             HH           : Notified

 RN/MD: TESTED



             (BEAKER) (test code =                                        AT [facility] [address] [facility]



             6685)                                               [facility], [facility]

30:



                                                                 /Techni

christina ID =



                                                                 [id number] for LONA GOLDSMITH



BASIC METABOLIC [id number] 07:44:00





             Test Item    Value        Reference Range Interpretation Comments

 

             SODIUM (BEAKER) 134 meq/L    136-145      L            



             (test code = 381)                                        

 

             POTASSIUM (BEAKER) 4.4 meq/L    3.5-5.1                   



             (test code = 379)                                        

 

             CHLORIDE (BEAKER) 103 meq/L                        



             (test code = 382)                                        

 

             CO2 (BEAKER) (test 20 meq/L     22-29        L            



             code = 355)                                         

 

             BLOOD UREA NITROGEN 17 mg/dL     7-21                      



             (BEAKER) (test code                                        



             = 354)                                              

 

             CREATININE (BEAKER) 1.09 mg/dL   0.57-1.25                 



             (test code = 358)                                        

 

             GLUCOSE RANDOM 464 mg/dL           HH           



             (BEAKER) (test code                                        



             = 652)                                              

 

             CALCIUM (BEAKER) 8.6 mg/dL    8.4-10.2                  



             (test code = 697)                                        

 

             EGFR (BEAKER) (test 94 mL/min/1.73                           ESTIMA

HUMA GFR IS



             code = 1092) sq m                                   NOT AS ACCURATE

 AS



                                                                 CREATININE



                                                                 CLEARANCE IN



                                                                 PREDICTING



                                                                 GLOMERULAR



                                                                 FILTRATION RATE

.



                                                                 ESTIMATED GFR I

S



                                                                 NOT APPLICABLE 

FOR



                                                                 DIALYSIS PATIEN

TS.



LIPID [id number] 07:34:00





             Test Item    Value        Reference Range Interpretation Comments

 

             TRIGLYCERIDES (BEAKER) (test code = 750 mg/dL                      

        



             540)                                                

 

             CHOLESTEROL (BEAKER) (test code = 196 mg/dL                        

      



             631)                                                

 

             HDL CHOLESTEROL (BEAKER) (test code 22 mg/dL                       

        



             = 976)                                              



Calculated LDL not valid if triglyceride &gt;400 mg/dLTriglyceride Reference 
Range: Low Risk &lt;150Borderline 150-199 High Risk 200-499 Very High Risk 
&gt;=500Cholesterol Reference Range:  Low Risk &lt;200 Borderline 200-239 High 
Risk &gt;240HDL Cholesterol Reference Range: Low Risk &gt;=60 High Risk 
&lt;40LDL Cholesterol Reference Range: Optimal &lt;100 Near Optimal 100-129 
Borderline 130-159 [id number] Very High &gt;=190POCT-GLUCOSE [id number] 
00:49:00





             Test Item    Value        Reference Range Interpretation Comments

 

             POC-GLUCOSE METER 399 mg/dL           H            : TESTED A

T [facility] [address]



             ([place]) (test code =                                        [place],



             1538)                                               [id number]:



                                                                 /Techni

christina ID



                                                                 = [id number] for CLAY BATRES, FOOT, 2 VIEWS, [id number] 22:28:00Reason for exam:-&gt;osteomyletitis
FINAL REPORT PATIENT ID: [id number] TECHNIQUE: Two views each of the bilateral 
feet HISTORY: osteomyletitis. COMPARISON: None. IMPRESSION:No acute displaced 
fracture or dislocation. Joint spaces are within normal limits.Severe soft 
tissue swelling.On the right subcentimeter nonspecific calcifications adjacent 
to the heel plantar aspect. Correlate with physical exam. Severely limited exam 
due to patientbody habitus. No definite cortical irregularity.There is clinical 
concern for osteomyelitis, recommend MRI with contrast. Signed: Sriram Valera 
MDReport Verified Date/Time: 2020 22:28:25 Reading Location: [facility] C013W 
Consult Reading Room Electronically signed by: SRIRAM VALERA DO on
2020 10:28 PMRAD, FOOT, 2 VIEWS, [id number] 22:28:00Reason for 
exam:-&gt;osteomyletitsFINAL REPORT PATIENT ID: [id number] TECHNIQUE: Two views 
each of the bilateral feet HISTORY: osteomyletitis. COMPARISON: None. 
IMPRESSION:No acute displaced fracture or dislocation. Joint spaces are within 
normal limits.Severe soft tissue swelling.On the right subcentimeter nonspecific
calcifications adjacent to the heel plantar aspect. Correlate with physical 
exam. Severely limited exam due to patientbody habitus. No definite cortical 
irregularity.There is clinical concern for osteomyelitis, recommend MRI with 
contrast. Signed: Sriram Valera MDReport Verified Date/Time: 2020 22:28:25
Reading Location: [facility] B1 C013W Consult Reading Room Electronically signed by: 
SRIRAM VALERA DO on2020 10:28 PMPOCT-GLUCOSE [id number] 
21:04:00





             Test Item    Value        Reference Range Interpretation Comments

 

             POC-GLUCOSE METER 430 mg/dL           HH           : Notified

 RN/MD:



             (KUN) (test code =                                        TESTED

 AT [facility] [address]



             1538)                                               [facility],



                                                                 [address]:



                                                                 /Techni

christina ID



                                                                 = [id number] for DEYSI ADRIAN



ERTAPENEM:SUSC:PT:ISOLATE:ORDQN:[id number] 16:48:00





             Test Item    Value        Reference Range Interpretation Comments

 

             Culture: Urine (test >100,000 CFU/mL Proteus                       

    



             code = Culture: mirabilis 10,000 -                           



             Urine)       50,000 CFU/mL Skin Jaime                           



[facility]:ORDQN:[id number] 16:48:00





             Test Item    Value        Reference Range Interpretation Comments

 

             Proteus mirabilis (test Proteus mirabilis                          

 



             code = Proteus mirabilis)                                        



[facility] AND [id number] 16:48:00





             Test Item    Value        Reference Range Interpretation Comments

 

             UA Nitrite (test code Negative (18 10:48                      

     



             = UA Nitrite) AM)                                    



[facility] AND [id number] 16:48:00





             Test Item    Value        Reference Range Interpretation Comments

 

             UA Bili (test code = Negative *NA*(18                         

  



             UA Bili)     10:48 AM)                              



[facility] AND [id number] 16:48:00





             Test Item    Value        Reference Range Interpretation Comments

 

             UA Ketones (test code Negative *NA*(18                        

   



             = UA Ketones) 10:48 AM)                              



[facility] [id number] 16:48:00





             Test Item    Value        Reference Range Interpretation Comments

 

             UA Blood (test code = Trace *ABN*(18                          

 



             UA Blood)    10:48 AM)                              



[facility] AND [id number] 16:48:00





             Test Item    Value        Reference Range Interpretation Comments

 

             UA Urobilinogen (test code = UA 0.2          0.1-1.0               

    



             Urobilinogen)                                        



[facility] AND [id number] 16:48:00





             Test Item    Value        Reference Range Interpretation Comments

 

             UA Leuk Est (test code Large *ABN*(18                         

  



             = UA Leuk Est) 10:48 AM)                              



[facility] AND [id number] 16:48:00





             Test Item    Value        Reference Range Interpretation Comments

 

             UA Protein (test code Negative (18 10:48                      

     



             = UA Protein) AM)                                    



[facility] AND [id number] 16:48:00





             Test Item    Value        Reference Range Interpretation Comments

 

             UA Glucose (test code Negative (18 10:48                      

     



             = UA Glucose) AM)                                    



[facility] AND [id number] 16:48:00





             Test Item    Value        Reference Range Interpretation Comments

 

             UA pH (test code = UA pH) 7.0 1        5.0-8.0                   



[facility] AND [id number] 16:48:00





             Test Item    Value        Reference Range Interpretation Comments

 

             UA Spec Grav (test code = UA Spec 1.015 1                          

      



             Grav)                                               



[facility] AND [id number] 16:48:00





             Test Item    Value        Reference Range Interpretation Comments

 

             UA Color (test code = Yellow *NA*(18                          

 



             UA Color)    10:48 AM)                              



[facility] AND [id number] 16:48:00





             Test Item    Value        Reference Range Interpretation Comments

 

             UA Turbidity (test code = Clear (18 10:48                     

      



             UA Turbidity) AM)                                    



[facility] AND [id number] 16:48:00





             Test Item    Value        Reference Range Interpretation Comments

 

             UA Mucus (test code = UA Mucus) Few /LPF                           

    



[facility] AND [id number] 16:48:00





             Test Item    Value        Reference Range Interpretation Comments

 

             UA Bacteria (test code = UA Few /HPF                               



             Bacteria)                                           



[facility] AND [id number] 16:48:00





             Test Item    Value        Reference Range Interpretation Comments

 

             UA RBC (test code = 0-2 /HPF     See_Comment                [Automa

huma message] The



             UA RBC)                                             system which ge

nerated



                                                                 this result tra

nsmitted



                                                                 reference range

: <=2. The



                                                                 reference range

 was not



                                                                 used to interpr

et this



                                                                 result as zayda

l/abnormal.



[facility] AND [id number] 16:48:00





             Test Item    Value        Reference Range Interpretation Comments

 

             UA Sq Epi (test code = None Seen (18                          

 



             UA Sq Epi)   10:48 AM)                              



[facility] AND [id number] 16:48:00





             Test Item    Value        Reference Range Interpretation Comments

 

             UA WBC (test code = UA WBC)  /HPF                            



[facility]:ORDQN:[id number] 16:48:00





             Test Item    Value        Reference Range Interpretation Comments

 

             Culture: Urine (test >100,000 CFU/mL Proteus                       

    



             code = Culture: mirabilis 10,000 -                           



             Urine)       50,000 CFU/mL Skin Jaime                           



[facility]PT:ISOLATE:ORDQN:[id number] 16:48:00





             Test Item    Value        Reference Range Interpretation Comments

 

             Proteus mirabilis (test Proteus mirabilis                          

 



             code = Proteus mirabilis)                                        



[facility] AND [id number] 16:48:00





             Test Item    Value        Reference Range Interpretation Comments

 

             UA Nitrite (test code Negative (18 10:48                      

     



             = UA Nitrite) AM)                                    



[facility] AND [id number] 16:48:00





             Test Item    Value        Reference Range Interpretation Comments

 

             UA Bili (test code = Negative *NA*(18                         

  



             UA Bili)     10:48 AM)                              



[facility] AND [id number] 16:48:00





             Test Item    Value        Reference Range Interpretation Comments

 

             UA Ketones (test code Negative *NA*(18                        

   



             = UA Ketones) 10:48 AM)                              



[facility] [id number] 16:48:00





             Test Item    Value        Reference Range Interpretation Comments

 

             UA Blood (test code = Trace *ABN*(18                          

 



             UA Blood)    10:48 AM)                              



[facility] [id number] 16:48:00





             Test Item    Value        Reference Range Interpretation Comments

 

             UA Urobilinogen (test code = UA 0.2          0.1-1.0               

    



             Urobilinogen)                                        



[facility] [id number] 16:48:00





             Test Item    Value        Reference Range Interpretation Comments

 

             UA Leuk Est (test code Large *ABN*(18                         

  



             = UA Leuk Est) 10:48 AM)                              



[facility] [id number] 16:48:00





             Test Item    Value        Reference Range Interpretation Comments

 

             UA Protein (test code Negative (18 10:48                      

     



             = UA Protein) AM)                                    



[facility] [id number] 16:48:00





             Test Item    Value        Reference Range Interpretation Comments

 

             UA Glucose (test code Negative (18 10:48                      

     



             = UA Glucose) AM)                                    



[facility] [id number] 16:48:00





             Test Item    Value        Reference Range Interpretation Comments

 

             UA pH (test code = UA pH) 7.0 1        5.0-8.0                   



[facility] [id number] 16:48:00





             Test Item    Value        Reference Range Interpretation Comments

 

             UA Spec Grav (test code = UA Spec 1.015 1                          

      



             Grav)                                               



[facility] [id number] 16:48:00





             Test Item    Value        Reference Range Interpretation Comments

 

             UA Color (test code = Yellow *NA*(18                          

 



             UA Color)    10:48 AM)                              



[facility] [id number] 16:48:00





             Test Item    Value        Reference Range Interpretation Comments

 

             UA Turbidity (test code = Clear (18 10:48                     

      



             UA Turbidity) AM)                                    



[facility] [id number] 16:48:00





             Test Item    Value        Reference Range Interpretation Comments

 

             UA Mucus (test code = UA Mucus) Few /LPF                           

    



[facility] [id number] 16:48:00





             Test Item    Value        Reference Range Interpretation Comments

 

             UA Bacteria (test code = UA Few /HPF                               



             Bacteria)                                           



[facility] [id number] 16:48:00





             Test Item    Value        Reference Range Interpretation Comments

 

             UA RBC (test code = 0-2 /HPF     See_Comment                [Automa

huma message] The



             UA RBC)                                             system which ge

nerated



                                                                 this result tra

nsmitted



                                                                 reference range

: <=2. The



                                                                 reference range

 was not



                                                                 used to interpr

et this



                                                                 result as zayda

l/abnormal.



[facility] [id number] 16:48:00





             Test Item    Value        Reference Range Interpretation Comments

 

             UA Sq Epi (test code = None Seen (18                          

 



             UA Sq Epi)   10:48 AM)                              



[facility] [id number] 16:48:00





             Test Item    Value        Reference Range Interpretation Comments

 

             UA WBC (test code = UA WBC)  /HPF                            



[facility] [id number] 11:57:00





             Test Item    Value        Reference Range Interpretation Comments

 

             Antibody Scrn (test Negative (18 5:57                         

  



             code = Antibody Scrn) AM)                                    



[facility] [id number] 11:57:00





             Test Item    Value        Reference Range Interpretation Comments

 

             ABO/Rh (test code = ABO/Rh) AB POS                                 



[facility] 11:57:00





             Test Item    Value        Reference Range Interpretation Comments

 

             PTT (test code = PTT) 33.4 s       22.9-35.8                 



[facility] ArlingtonJrjffbeILJVWRDHCN0444-08-58 11:57:00





             Test Item    Value        Reference Range Interpretation Comments

 

             PT (test code = PT) 13.7 s       12.0-14.7                 



[facility] ArlingtonWgdehnsXYJNKHMRQQ3451-67-13 11:57:00





             Test Item    Value        Reference Range Interpretation Comments

 

             INR (test code = INR) 1.05 1       0.85-1.17                 



[facility] [id number] 11:57:00





             Test Item    Value        Reference Range Interpretation Comments

 

             Antibody Scrn (test Negative (18 5:57                         

  



             code = Antibody Scrn) AM)                                    



[facility] [id number] 11:57:00





             Test Item    Value        Reference Range Interpretation Comments

 

             ABO/Rh (test code = ABO/Rh) AB POS                                 



[facility] ArlingtonFxfxbksFNQNMDUWOG4125-07-01 11:57:00





             Test Item    Value        Reference Range Interpretation Comments

 

             PTT (test code = PTT) 33.4 s       22.9-35.8                 



[facility] ArlingtonFskowxcIDGYKNGBHU0580-03-21 11:57:00





             Test Item    Value        Reference Range Interpretation Comments

 

             PT (test code = PT) 13.7 s       12.0-14.7                 



[facility] ArlingtonWzwmkadNHYWSGYPAU7171-39-18 11:57:00





             Test Item    Value        Reference Range Interpretation Comments

 

             INR (test code = INR) 1.05 1       0.85-1.17                 



[facility]2018-11-08 10:50:01





             Test Item    Value        Reference Range Interpretation Comments

 

             eGFR (test code = eGFR) 133                                    



[id number]:50:01





             Test Item    Value        Reference Range Interpretation Comments

 

             Calcium Lvl (test code = Calcium Lvl) 9.4          8.5-10.5        

          



[facility]2018-11-08 10:50:01





             Test Item    Value        Reference Range Interpretation Comments

 

             CO2 (test code = CO2) 28           24-32                     



[facility]2018-11-08 10:50:01





             Test Item    Value        Reference Range Interpretation Comments

 

             BUN (test code = BUN) 14           7-22                      



[facility]2018-11-08 10:50:01





             Test Item    Value        Reference Range Interpretation Comments

 

             Glucose Lvl (test code = Glucose Lvl) 89           70-99           

          



[facility]2018-11-08 10:50:01





             Test Item    Value        Reference Range Interpretation Comments

 

             Chloride Lvl (test code = Chloride Lvl) 104                  

            



[facility]2018-11-08 10:50:01





             Test Item    Value        Reference Range Interpretation Comments

 

             Potassium Lvl (test code = Potassium 4.2          3.5-5.1          

         



             Lvl)                                                



[facility]2018-11-08 10:50:01





             Test Item    Value        Reference Range Interpretation Comments

 

             Sodium Lvl (test code = Sodium Lvl) 137          135-145           

        



[facility]2018-11-08 10:50:01





             Test Item    Value        Reference Range Interpretation Comments

 

             Creatinine Lvl (test code = Creatinine 0.85         0.50-1.40      

           



             Lvl)                                                



[facility]2018-11-08 10:50:01





             Test Item    Value        Reference Range Interpretation Comments

 

             AGAP (test code = AGAP) 9.2          10.0-20.0                 



[facility] ArlingtonYmrxssiLGJWTCPPXG1774-80-66 10:50:01





             Test Item    Value        Reference Range Interpretation Comments

 

             ACT (TEG) Rapid (test code = ACT (TEG) 136 s                 

           



             Rapid)                                              



[facility] ArlingtonPtcmrwkRFKOFMIQOX2415-27-51 10:50:01





             Test Item    Value        Reference Range Interpretation Comments

 

             Split Point Rapid (test code = Split 0.6 min                       

         



             Point Rapid)                                        



[facility] ArlingtonRbxyocgADBOHIZGHG5480-09-57 10:50:01





             Test Item    Value        Reference Range Interpretation Comments

 

             R-time Rapid (test code = R-time 0.9 min      0.4-0.7              

     



             Rapid)                                              



[facility] ArlingtonMjrrdqwYPBFQECPWM6528-90-92 10:50:01





             Test Item    Value        Reference Range Interpretation Comments

 

             K-time Rapid (test code = K-time 1.4 min      0.6-2.3              

     



             Rapid)                                              



[facility] ArlingtonCynpuwyPJOZBRWYLB6406-77-97 10:50:01





             Test Item    Value        Reference Range Interpretation Comments

 

             Angle Rapid (test code = Angle 71 degrees   64-80                  

   



             Rapid)                                              



[facility] ArlingtonRapezkoUXKTWZYRSK8138-31-57 10:50:01





             Test Item    Value        Reference Range Interpretation Comments

 

             G-value Rapid (test code = G-value 12.7         5.0-11.6           

       



             Rapid)                                              



[facility] ArlingtonTdwrpzcBOOCMCBVTE5950-50-29 10:50:01





             Test Item    Value        Reference Range Interpretation Comments

 

             Max Amplitude Rapid (test code = Max 72 mm        52-71            

         



             Amplitude Rapid)                                        



[facility] ArlingtonPbcajztSJLTVYYEAM0745-23-57 10:50:01





             Test Item    Value        Reference Range Interpretation Comments

 

             Estimated % Lysis Rapid 0.1          See_Comment                [Au

tomated message] The



             (test code = Estimated                                        syste

m which generated



             % Lysis Rapid)                                        this result t

ransmitted



                                                                 reference range

: <=7.5.



                                                                 The reference r

zhen was



                                                                 not used to int

erpret



                                                                 this result as



                                                                 normal/abnormal

.



[facility] ArlingtonHyqaaifQGCEFPOSVE2743-02-85 10:50:01





             Test Item    Value        Reference Range Interpretation Comments

 

             Platelet (test code = Platelet) 367          133-450               

    



[facility] ArlingtonGrpcwzeVJVTEKLBNK0435-35-38 10:50:01





             Test Item    Value        Reference Range Interpretation Comments

 

             MPV (test code = MPV) 7.8          7.4-10.4                  



[facility] ArlingtonGqpflbeGJIVCNCXYR3352-61-30 10:50:01





             Test Item    Value        Reference Range Interpretation Comments

 

             MCH (test code = MCH) 27.4 pg      27.0-31.0                 



[facility] ArlingtonQcwwrswEVNIOOEFKE0280-51-95 10:50:01





             Test Item    Value        Reference Range Interpretation Comments

 

             MCV (test code = MCV) 80.7         80.0-94.0                 



[facility] ArlingtonPmfawhsNQZMQBTNJV8449-04-73 10:50:01





             Test Item    Value        Reference Range Interpretation Comments

 

             MCHC (test code = MCHC) 34.0         32.0-36.0                 



[facility] ArlingtonMovidqtNXPXRDTCPU6925-54-31 10:50:01





             Test Item    Value        Reference Range Interpretation Comments

 

             RDW (test code = RDW) 18.9         11.5-14.5                 



[facility] ArlingtonQlswbdwNUWXEGTSPX7558-41-81 10:50:01





             Test Item    Value        Reference Range Interpretation Comments

 

             Hct (test code = Hct) 43.3         42.0-54.0                 



[facility] ArlingtonJcxafoqMOTXWRZZBZ7519-19-01 10:50:01





             Test Item    Value        Reference Range Interpretation Comments

 

             WBC (test code = WBC) 9.3          3.7-10.4                  



[facility] ArlingtonBwzkpjeHBCXGTKQTG1038-08-07 10:50:01





             Test Item    Value        Reference Range Interpretation Comments

 

             Hgb (test code = Hgb) 14.7         14.0-18.0                 



[facility] ArlingtonEucyuvpCKZLXFDSQN2106-80-75 10:50:01





             Test Item    Value        Reference Range Interpretation Comments

 

             RBC (test code = RBC) 5.36         4.70-6.10                 



[facility] ArlingtonZdojucdPWIAOQNEEM7283-76-42 10:50:01





             Test Item    Value        Reference Range Interpretation Comments

 

             Eosinophils # (test code 0.2          See_Comment                [A

utomated message] The



             = Eosinophils #)                                        system whic

h generated



                                                                 this result tra

nsmitted



                                                                 reference range

: <=0.5.



                                                                 The reference r

zhen was



                                                                 not used to int

erpret



                                                                 this result as



                                                                 normal/abnormal

.



[facility] ArlingtonVpucqbiLVEGUPGMFM8930-58-33 10:50:01





             Test Item    Value        Reference Range Interpretation Comments

 

             Basophils # (test code 0.1          See_Comment                [Aut

omated message] The



             = Basophils #)                                        system which 

generated



                                                                 this result tra

nsmitted



                                                                 reference range

: <=0.2.



                                                                 The reference r

zhen was



                                                                 not used to int

erpret



                                                                 this result as



                                                                 normal/abnormal

.



[facility] ArlingtonVuffstpNFFQQYMMNS6468-16-10 10:50:01





             Test Item    Value        Reference Range Interpretation Comments

 

             Lymphocytes # (test code = Lymphocytes 1.8          1.0-5.5        

           



             #)                                                  



[facility] ArlingtonIcliagpFNGLPPMWMJ2438-25-14 10:50:01





             Test Item    Value        Reference Range Interpretation Comments

 

             Monocytes # (test code 0.9          See_Comment                [Aut

omated message] The



             = Monocytes #)                                        system which 

generated



                                                                 this result tra

nsmitted



                                                                 reference range

: <=0.8.



                                                                 The reference r

zhen was



                                                                 not used to int

erpret



                                                                 this result as



                                                                 normal/abnormal

.



[facility] ArlingtonPikheyeKXOKJWYNOS8045-15-16 10:50:01





             Test Item    Value        Reference Range Interpretation Comments

 

             Neutrophils # (test code = Neutrophils 6.3          1.5-8.1        

           



             #)                                                  



[facility] ArlingtonHywdtikXODAXXUCJN6245-34-05 10:50:01





             Test Item    Value        Reference Range Interpretation Comments

 

             Eosinophils (test code = 2.0          See_Comment                [A

utomated message] The



             Eosinophils)                                        system which ge

nerated



                                                                 this result tra

nsmitted



                                                                 reference range

: <=4.0.



                                                                 The reference r

zhen was



                                                                 not used to int

erpret



                                                                 this result as



                                                                 normal/abnormal

.



[facility] ArlingtonAvowqxnMHXHMTLRWY4794-70-06 10:50:01





             Test Item    Value        Reference Range Interpretation Comments

 

             Segs (test code = Segs) 67.4         45.0-75.0                 



[facility] ArlingtonImpccgoYQVTJQXIUO2314-79-04 10:50:01





             Test Item    Value        Reference Range Interpretation Comments

 

             Lymphocytes (test code = Lymphocytes) 19.9         20.0-40.0       

          



[facility] ArlingtonCqbdjjjEHKAFOUFWA8969-38-60 10:50:01





             Test Item    Value        Reference Range Interpretation Comments

 

             Basophils (test code = 1.0          See_Comment                [Aut

omated message] The



             Basophils)                                          system which ge

nerated



                                                                 this result tra

nsmitted



                                                                 reference range

: <=1.0.



                                                                 The reference r

zhen was



                                                                 not used to int

erpret



                                                                 this result as



                                                                 normal/abnormal

.



[facility] ArlingtonOwqyppdKULCRFFTLC3635-75-94 10:50:01





             Test Item    Value        Reference Range Interpretation Comments

 

             Monocytes (test code = Monocytes) 9.7          2.0-12.0            

      



[facility]2018-11-08 10:50:01





             Test Item    Value        Reference Range Interpretation Comments

 

             eGFR (test code = eGFR) 133                                    



[facility]2018-11-08 10:50:01





             Test Item    Value        Reference Range Interpretation Comments

 

             Calcium Lvl (test code = Calcium Lvl) 9.4          8.5-10.5        

          



[facility]2018-11-08 10:50:01





             Test Item    Value        Reference Range Interpretation Comments

 

             CO2 (test code = CO2) 28           24-32                     



[facility]2018-11-08 10:50:01





             Test Item    Value        Reference Range Interpretation Comments

 

             BUN (test code = BUN) 14           7-22                      



[facility]2018-11-08 10:50:01





             Test Item    Value        Reference Range Interpretation Comments

 

             Glucose Lvl (test code = Glucose Lvl) 89           70-99           

          



[facility]2018-11-08 10:50:01





             Test Item    Value        Reference Range Interpretation Comments

 

             Chloride Lvl (test code = Chloride Lvl) 104                  

            



[facility]2018-11-08 10:50:01





             Test Item    Value        Reference Range Interpretation Comments

 

             Potassium Lvl (test code = Potassium 4.2          3.5-5.1          

         



             Lvl)                                                



[facility]2018-11-08 10:50:01





             Test Item    Value        Reference Range Interpretation Comments

 

             Sodium Lvl (test code = Sodium Lvl) 137          135-145           

        



[facility]2018-11-08 10:50:01





             Test Item    Value        Reference Range Interpretation Comments

 

             Creatinine Lvl (test code = Creatinine 0.85         0.50-1.40      

           



             Lvl)                                                



[facility]2018-11-08 10:50:01





             Test Item    Value        Reference Range Interpretation Comments

 

             AGAP (test code = AGAP) 9.2          10.0-20.0                 



[place] [address]-11-08 10:50:01





             Test Item    Value        Reference Range Interpretation Comments

 

             ACT (TEG) Rapid (test code = ACT (TEG) 136 s                 

           



             Rapid)                                              



[place] [address]-11-08 10:50:01





             Test Item    Value        Reference Range Interpretation Comments

 

             Split Point Rapid (test code = Split 0.6 min                       

         



             Point Rapid)                                        



[facility] ArlingtonNoobulbBRAYUPTEYH0744-30-57 10:50:01





             Test Item    Value        Reference Range Interpretation Comments

 

             R-time Rapid (test code = R-time 0.9 min      0.4-0.7              

     



             Rapid)                                              



[place] [address]-11-08 10:50:01





             Test Item    Value        Reference Range Interpretation Comments

 

             K-time Rapid (test code = K-time 1.4 min      0.6-2.3              

     



             Rapid)                                              



[place] [address]-11-08 10:50:01





             Test Item    Value        Reference Range Interpretation Comments

 

             Angle Rapid (test code = Angle 71 degrees   64-80                  

   



             Rapid)                                              



[facility]dxjvfrWUWVYZGAJV1275-27-69 10:50:01





             Test Item    Value        Reference Range Interpretation Comments

 

             G-value Rapid (test code = G-value 12.7         5.0-11.6           

       



             Rapid)                                              



[facility] ArlingtonEgizrxxBBUMXIFHCK3338-19-16 10:50:01





             Test Item    Value        Reference Range Interpretation Comments

 

             Max Amplitude Rapid (test code = Max 72 mm        52-71            

         



             Amplitude Rapid)                                        



[place] [id number] 10:50:01





             Test Item    Value        Reference Range Interpretation Comments

 

             Estimated % Lysis Rapid 0.1          See_Comment                [Au

tomated message] The



             (test code = Estimated                                        syste

m which generated



             % Lysis Rapid)                                        this result t

ransmitted



                                                                 reference range

: <=7.5.



                                                                 The reference r

zhen was



                                                                 not used to int

erpret



                                                                 this result as



                                                                 normal/abnormal

.



[facility] ArlingtonGgztbvsBQYXCALJWK8385-50-17 10:50:01





             Test Item    Value        Reference Range Interpretation Comments

 

             Platelet (test code = Platelet) 367          133-450               

    



[facility] ArlingtonNwyzuslPEHTYXMIOE6137-82-02 10:50:01





             Test Item    Value        Reference Range Interpretation Comments

 

             MPV (test code = MPV) 7.8          7.4-10.4                  



[facility] ArlingtonTaovcjjRDUGRQFSKR6995-65-25 10:50:01





             Test Item    Value        Reference Range Interpretation Comments

 

             MCH (test code = MCH) 27.4 pg      27.0-31.0                 



[facility] ArlingtonBaazixyWTYAMENGIL7120-34-97 10:50:01





             Test Item    Value        Reference Range Interpretation Comments

 

             MCV (test code = MCV) 80.7         80.0-94.0                 



[facility] ArlingtonCnasrfbWKBFZCICCA7592-72-15 10:50:01





             Test Item    Value        Reference Range Interpretation Comments

 

             MCHC (test code = MCHC) 34.0         32.0-36.0                 



[facility] ArlingtonOnxvhiwTNMJTZKJFZ0261-33-22 10:50:01





             Test Item    Value        Reference Range Interpretation Comments

 

             RDW (test code = RDW) 18.9         11.5-14.5                 



[facility] ArlingtonIjbmbzeJHZXTTPRNS6461-94-61 10:50:01





             Test Item    Value        Reference Range Interpretation Comments

 

             Hct (test code = Hct) 43.3         42.0-54.0                 



[facility] ArlingtonPjhwvmdJWGPXCCFZB3401-30-42 10:50:01





             Test Item    Value        Reference Range Interpretation Comments

 

             WBC (test code = WBC) 9.3          3.7-10.4                  



[facility] ArlingtonXpxafbkOGYKNLNCMX2863-92-07 10:50:01





             Test Item    Value        Reference Range Interpretation Comments

 

             Hgb (test code = Hgb) 14.7         14.0-18.0                 



[facility] ArlingtonKnwrzdlECGHLWDYMA2472-77-54 10:50:01





             Test Item    Value        Reference Range Interpretation Comments

 

             RBC (test code = RBC) 5.36         4.70-6.10                 



[facility] ArlingtonOdqzlmjSVGFPUGSXO9779-13-63 10:50:01





             Test Item    Value        Reference Range Interpretation Comments

 

             Eosinophils # (test code 0.2          See_Comment                [A

utomated message] The



             = Eosinophils #)                                        system whic

h generated



                                                                 this result tra

nsmitted



                                                                 reference range

: <=0.5.



                                                                 The reference r

zhen was



                                                                 not used to int

erpret



                                                                 this result as



                                                                 normal/abnormal

.



[facility] ArlingtonLxresbeKPVKCGZVBQ7820-45-69 10:50:01





             Test Item    Value        Reference Range Interpretation Comments

 

             Basophils # (test code 0.1          See_Comment                [Aut

omated message] The



             = Basophils #)                                        system which 

generated



                                                                 this result tra

nsmitted



                                                                 reference range

: <=0.2.



                                                                 The reference r

zhen was



                                                                 not used to int

erpret



                                                                 this result as



                                                                 normal/abnormal

.



[facility] ArlingtonTolqtvyOGIHSDPSCH5164-51-28 10:50:01





             Test Item    Value        Reference Range Interpretation Comments

 

             Lymphocytes # (test code = Lymphocytes 1.8          1.0-5.5        

           



             #)                                                  



[facility] ArlingtonNjomrucPAKXMLRPWF9092-37-68 10:50:01





             Test Item    Value        Reference Range Interpretation Comments

 

             Monocytes # (test code 0.9          See_Comment                [Aut

omated message] The



             = Monocytes #)                                        system which 

generated



                                                                 this result tra

nsmitted



                                                                 reference range

: <=0.8.



                                                                 The reference r

zhen was



                                                                 not used to int

erpret



                                                                 this result as



                                                                 normal/abnormal

.



[facility] ArlingtonHtqhkpzWJXEHYSDUS5133-80-78 10:50:01





             Test Item    Value        Reference Range Interpretation Comments

 

             Neutrophils # (test code = Neutrophils 6.3          1.5-8.1        

           



             #)                                                  



[facility] ArlingtonOqjluynPGEPPZJGEX4376-70-99 10:50:01





             Test Item    Value        Reference Range Interpretation Comments

 

             Eosinophils (test code = 2.0          See_Comment                [A

utomated message] The



             Eosinophils)                                        system which ge

nerated



                                                                 this result tra

nsmitted



                                                                 reference range

: <=4.0.



                                                                 The reference r

zhen was



                                                                 not used to int

erpret



                                                                 this result as



                                                                 normal/abnormal

.



[facility] ArlingtonTfqcqzmOGUANIVMQB4932-57-16 10:50:01





             Test Item    Value        Reference Range Interpretation Comments

 

             Segs (test code = Segs) 67.4         45.0-75.0                 



[facility] ArlingtonZlkqbwlIBLFHDSIIA8989-96-91 10:50:01





             Test Item    Value        Reference Range Interpretation Comments

 

             Lymphocytes (test code = Lymphocytes) 19.9         20.0-40.0       

          



[facility] ArlingtonJnpzoeyOUYYALXWVG9172-09-52 10:50:01





             Test Item    Value        Reference Range Interpretation Comments

 

             Basophils (test code = 1.0          See_Comment                [Aut

omated message] The



             Basophils)                                          system which ge

nerated



                                                                 this result tra

nsmitted



                                                                 reference range

: <=1.0.



                                                                 The reference r

zhen was



                                                                 not used to int

erpret



                                                                 this result as



                                                                 normal/abnormal

.



[facility] ArlingtonQkxhdmdALZTDLPGKU8517-85-47 10:50:01





             Test Item    Value        Reference Range Interpretation Comments

 

             Monocytes (test code = Monocytes) 9.7          2.0-12.0            

      



[facility]2018-05-08 05:42:00





             Test Item    Value        Reference Range Interpretation Comments

 

             B/C Ratio (test code = B/C Ratio) 17 1         6-25                

      



[facility]2018-05-08 05:42:00





             Test Item    Value        Reference Range Interpretation Comments

 

             Globulin (test code = Globulin) 4.3          2.7-4.2               

    



[facility]2018-05-08 05:42:00





             Test Item    Value        Reference Range Interpretation Comments

 

             A/G Ratio (test code = A/G Ratio) 0.7 1        0.7-1.6             

      



[facility]2018-05-08 05:42:00





             Test Item    Value        Reference Range Interpretation Comments

 

             AGAP (test code = AGAP) 14.4         10.0-20.0                 



[facility]2018-05-08 05:42:00





             Test Item    Value        Reference Range Interpretation Comments

 

             eGFR (test code = eGFR) 113                                    



[facility]2018-05-08 05:42:00





             Test Item    Value        Reference Range Interpretation Comments

 

             Alk Phos (test code = Alk Phos) 76                           

    



[facility]2018-05-08 05:42:00





             Test Item    Value        Reference Range Interpretation Comments

 

             ALT (test code = ALT) 35           See_Comment                [Auto

mated message] The



                                                                 system which ge

nerated this



                                                                 result transmit

huma



                                                                 reference range

: <=65. The



                                                                 reference range

 was not



                                                                 used to interpr

et this



                                                                 result as zayda

l/abnormal.



[facility]2018-05-08 05:42:00





             Test Item    Value        Reference Range Interpretation Comments

 

             Albumin Lvl (test code = Albumin Lvl) 2.8          3.5-5.0         

          



[facility]2018-05-08 05:42:00





             Test Item    Value        Reference Range Interpretation Comments

 

             Total Protein (test code = Total 7.1          6.4-8.4              

     



             Protein)                                            



[facility]2018-05-08 05:42:00





             Test Item    Value        Reference Range Interpretation Comments

 

             Calcium Lvl (test code = Calcium Lvl) 8.7          8.5-10.5        

          



[facility]2018-05-08 05:42:00





             Test Item    Value        Reference Range Interpretation Comments

 

             AST (test code = AST) 18           See_Comment                [Auto

mated message] The



                                                                 system which ge

nerated this



                                                                 result transmit

huma



                                                                 reference range

: <=37. The



                                                                 reference range

 was not



                                                                 used to interpr

et this



                                                                 result as zayda

l/abnormal.



[facility]2018-05-08 05:42:00





             Test Item    Value        Reference Range Interpretation Comments

 

             Bili Total (test code = Bili Total) 0.3          0.2-1.3           

        



[place] 983248-05-08 05:42:00





             Test Item    Value        Reference Range Interpretation Comments

 

             Potassium Lvl (test code = Potassium 4.4          3.5-5.1          

         



             Lvl)                                                



[facility]2018-05-08 05:42:00





             Test Item    Value        Reference Range Interpretation Comments

 

             Chloride Lvl (test code = Chloride Lvl) 109                  

            



[facility]2018-05-08 05:42:00





             Test Item    Value        Reference Range Interpretation Comments

 

             CO2 (test code = CO2) 23           24-32                     



[facility]2018-05-08 05:42:00





             Test Item    Value        Reference Range Interpretation Comments

 

             Glucose Lvl (test code = Glucose Lvl) 114          70-99           

          



[facility]2018-05-08 05:42:00





             Test Item    Value        Reference Range Interpretation Comments

 

             Creatinine Lvl (test code = Creatinine 1.01         0.50-1.40      

           



             Lvl)                                                



[facility]2018-05-08 05:42:00





             Test Item    Value        Reference Range Interpretation Comments

 

             BUN (test code = BUN) 17           7-22                      



[facility]2018-05-08 05:42:00





             Test Item    Value        Reference Range Interpretation Comments

 

             Sodium Lvl (test code = Sodium Lvl) 142          135-145           

        



[facility] ArlingtonEvqqqrnYGDMSAJWSO0889-88-92 05:42:00





             Test Item    Value        Reference Range Interpretation Comments

 

             Basophils (test code = 0.6          See_Comment                [Aut

omated message] The



             Basophils)                                          system which ge

nerated



                                                                 this result tra

nsmitted



                                                                 reference range

: <=1.0.



                                                                 The reference r

zhen was



                                                                 not used to int

erpret



                                                                 this result as



                                                                 normal/abnormal

.



[facility] ArlingtonSmqwpznKUQNEIFTMP4632-19-14 05:42:00





             Test Item    Value        Reference Range Interpretation Comments

 

             Segs-Bands # (test code = Segs-Bands #) 4.8          1.5-8.1       

            



[facility] ArlingtonTbywagtGWPVDCQVSB8860-99-94 05:42:00





             Test Item    Value        Reference Range Interpretation Comments

 

             Monocytes # (test code 0.7          See_Comment                [Aut

omated message] The



             = Monocytes #)                                        system which 

generated



                                                                 this result tra

nsmitted



                                                                 reference range

: <=0.8.



                                                                 The reference r

zhen was



                                                                 not used to int

erpret



                                                                 this result as



                                                                 normal/abnormal

.



[facility] ArlingtonJchtnsqNUEQYJHKCM6208-99-89 05:42:00





             Test Item    Value        Reference Range Interpretation Comments

 

             Lymphocytes # (test code = Lymphocytes 1.9          1.0-5.5        

           



             #)                                                  



[facility] ArlingtonGxbegalVNZWFTAHRV1145-61-38 05:42:00





             Test Item    Value        Reference Range Interpretation Comments

 

             Monocytes (test code = Monocytes) 9.4          2.0-12.0            

      



[facility] ArlingtonRklxbhjBDSKCQGELQ5304-40-24 05:42:00





             Test Item    Value        Reference Range Interpretation Comments

 

             Eosinophils # (test code 0.2          See_Comment                [A

utomated message] The



             = Eosinophils #)                                        system whic

h generated



                                                                 this result tra

nsmitted



                                                                 reference range

: <=0.5.



                                                                 The reference r

zhen was



                                                                 not used to int

erpret



                                                                 this result as



                                                                 normal/abnormal

.



[facility] ArlingtonAizfrbnOQGJTPNKCD0786-13-72 05:42:00





             Test Item    Value        Reference Range Interpretation Comments

 

             Eosinophils (test code = 2.9          See_Comment                [A

utomated message] The



             Eosinophils)                                        system which ge

nerated



                                                                 this result tra

nsmitted



                                                                 reference range

: <=4.0.



                                                                 The reference r

zhen was



                                                                 not used to int

erpret



                                                                 this result as



                                                                 normal/abnormal

.



[facility] ArlingtonKgsobinCZBNCCRGOS1993-77-16 05:42:00





             Test Item    Value        Reference Range Interpretation Comments

 

             Segs (test code = Segs) 62.2         45.0-75.0                 



[facility] ArlingtonDcufuwvKLKNJGOUON9620-77-62 05:42:00





             Test Item    Value        Reference Range Interpretation Comments

 

             Lymphocytes (test code = Lymphocytes) 24.9         20.0-40.0       

          



[facility] ArlingtonVelqxhcKYHTBJVOMA4431-21-97 05:42:00





             Test Item    Value        Reference Range Interpretation Comments

 

             MCH (test code = MCH) 27.5 pg      27.0-31.0                 



[facility] ArlingtonArdarhqYSGUZRPDHM6881-04-43 05:42:00





             Test Item    Value        Reference Range Interpretation Comments

 

             MCV (test code = MCV) 85.3         80.0-94.0                 



[facility] ArlingtonOsldheePIDHZQNDQS2889-06-46 05:42:00





             Test Item    Value        Reference Range Interpretation Comments

 

             Hct (test code = Hct) 43.9         42.0-54.0                 



[facility] ArlingtonZvkzeuePAODKCLQVO8005-33-50 05:42:00





             Test Item    Value        Reference Range Interpretation Comments

 

             Hgb (test code = Hgb) 14.2         14.0-18.0                 



[facility] ArlingtonDgibowfPFHFAWSNEF3194-43-52 05:42:00





             Test Item    Value        Reference Range Interpretation Comments

 

             WBC (test code = WBC) 7.7          3.7-10.4                  



[facility] ArlingtonXxavvtoBFUDBUZXCR9817-02-13 05:42:00





             Test Item    Value        Reference Range Interpretation Comments

 

             RBC (test code = RBC) 5.15         4.70-6.10                 



[facility] ArlingtonLkrkdggSVOIKCLIAS3327-81-34 05:42:00





             Test Item    Value        Reference Range Interpretation Comments

 

             MPV (test code = MPV) 8.4          7.4-10.4                  



[facility] ArlingtonTsznmeoULCWQHJWKN2153-14-18 05:42:00





             Test Item    Value        Reference Range Interpretation Comments

 

             MCHC (test code = MCHC) 32.3         32.0-36.0                 



[facility] ArlingtonLlnoxdvVCSGZIWHBD8836-83-23 05:42:00





             Test Item    Value        Reference Range Interpretation Comments

 

             RDW (test code = RDW) 17.3         11.5-14.5                 



[facility] ArlingtonPmuamtoXGRIUVWYBO3852-35-92 05:42:00





             Test Item    Value        Reference Range Interpretation Comments

 

             Platelet (test code = Platelet) 317          133-450               

    



[facility]2018-05-08 05:42:00





             Test Item    Value        Reference Range Interpretation Comments

 

             B/C Ratio (test code = B/C Ratio) 17 1         6-25                

      



[facility]2018-05-08 05:42:00





             Test Item    Value        Reference Range Interpretation Comments

 

             Globulin (test code = Globulin) 4.3          2.7-4.2               

    



[facility]2018-05-08 05:42:00





             Test Item    Value        Reference Range Interpretation Comments

 

             A/G Ratio (test code = A/G Ratio) 0.7 1        0.7-1.6             

      



[facility]2018-05-08 05:42:00





             Test Item    Value        Reference Range Interpretation Comments

 

             AGAP (test code = AGAP) 14.4         10.0-20.0                 



[facility]2018-05-08 05:42:00





             Test Item    Value        Reference Range Interpretation Comments

 

             eGFR (test code = eGFR) 113                                    



[facility]2018-05-08 05:42:00





             Test Item    Value        Reference Range Interpretation Comments

 

             Alk Phos (test code = Alk Phos) 76                           

    



[facility]2018-05-08 05:42:00





             Test Item    Value        Reference Range Interpretation Comments

 

             ALT (test code = ALT) 35           See_Comment                [Auto

mated message] The



                                                                 system which ge

nerated this



                                                                 result transmit

huma



                                                                 reference range

: <=65. The



                                                                 reference range

 was not



                                                                 used to interpr

et this



                                                                 result as zayda

l/abnormal.



[facility]2018-05-08 05:42:00





             Test Item    Value        Reference Range Interpretation Comments

 

             Albumin Lvl (test code = Albumin Lvl) 2.8          3.5-5.0         

          



[facility]2018-05-08 05:42:00





             Test Item    Value        Reference Range Interpretation Comments

 

             Total Protein (test code = Total 7.1          6.4-8.4              

     



             Protein)                                            



[facility]2018-05-08 05:42:00





             Test Item    Value        Reference Range Interpretation Comments

 

             Calcium Lvl (test code = Calcium Lvl) 8.7          8.5-10.5        

          



[facility]2018-05-08 05:42:00





             Test Item    Value        Reference Range Interpretation Comments

 

             AST (test code = AST) 18           See_Comment                [Auto

mated message] The



                                                                 system which ge

nerated this



                                                                 result transmit

huma



                                                                 reference range

: <=37. The



                                                                 reference range

 was not



                                                                 used to interpr

et this



                                                                 result as zayda

l/abnormal.



[facility]2018-05-08 05:42:00





             Test Item    Value        Reference Range Interpretation Comments

 

             Bili Total (test code = Bili Total) 0.3          0.2-1.3           

        



[facility]2018-05-08 05:42:00





             Test Item    Value        Reference Range Interpretation Comments

 

             Potassium Lvl (test code = Potassium 4.4          3.5-5.1          

         



             Lvl)                                                



[facility]2018-05-08 05:42:00





             Test Item    Value        Reference Range Interpretation Comments

 

             Chloride Lvl (test code = Chloride Lvl) 109                  

            



[facility]2018-05-08 05:42:00





             Test Item    Value        Reference Range Interpretation Comments

 

             CO2 (test code = CO2) 23           24-32                     



[facility]2018-05-08 05:42:00





             Test Item    Value        Reference Range Interpretation Comments

 

             Glucose Lvl (test code = Glucose Lvl) 114          70-99           

          



[facility]2018-05-08 05:42:00





             Test Item    Value        Reference Range Interpretation Comments

 

             Creatinine Lvl (test code = Creatinine 1.01         0.50-1.40      

           



             Lvl)                                                



[facility]2018-05-08 05:42:00





             Test Item    Value        Reference Range Interpretation Comments

 

             BUN (test code = BUN) 17           7-22                      



[facility]2018-05-08 05:42:00





             Test Item    Value        Reference Range Interpretation Comments

 

             Sodium Lvl (test code = Sodium Lvl) 142          135-145           

        



[facility]





             Test Item    Value        Reference Range Interpretation Comments

 

             Basophils (test code = 0.6          See_Comment                [Aut

omated message] The



             Basophils)                                          system which ge

nerated



                                                                 this result tra

nsmitted



                                                                 reference range

: <=1.0.



                                                                 The reference r

zhen was



                                                                 not used to int

erpret



                                                                 this result as



                                                                 normal/abnormal

.



[facility]otvmrNMFLKRHBDI1185[facility]14-37 [facility]:42:00





             Test Item    Value        Reference Range Interpretation Comments

 

             Segs-Bands # (test code = Segs-Bands #) 4.8          1.5-8.1       

            



[facility]SfflptnESGTDRHDJT5738-24-44 05:42:00





             Test Item    Value        Reference Range Interpretation Comments

 

             Monocytes # (test code 0.7          See_Comment                [Aut

omated message] The



             = Monocytes #)                                        system which 

generated



                                                                 this result tra

nsmitted



                                                                 reference range

: <=0.8.



                                                                 The reference r

zhen was



                                                                 not used to int

erpret



                                                                 this result as



                                                                 normal/abnormal

.



[facility]shxtiaTHCQTZEJVF5267-17-04 05:42:00





             Test Item    Value        Reference Range Interpretation Comments

 

             Lymphocytes # (test code = Lymphocytes 1.9          1.0-5.5        

           



             #)                                                  



[facility] ArlingtonSdgmygaTZOUCGJMYW4835-72-69 05:42:00





             Test Item    Value        Reference Range Interpretation Comments

 

             Monocytes (test code = Monocytes) 9.4          2.0-12.0            

      



[facility] ArlingtonVmvjymbMOUHIEQGSH2881-70-10 05:42:00





             Test Item    Value        Reference Range Interpretation Comments

 

             Eosinophils # (test code 0.2          See_Comment                [A

utomated message] The



             = Eosinophils #)                                        system whic

h generated



                                                                 this result tra

nsmitted



                                                                 reference range

: <=0.5.



                                                                 The reference r

zhen was



                                                                 not used to int

erpret



                                                                 this result as



                                                                 normal/abnormal

.



[facility] ArlingtonYftbpijLFXKBVEVMJ0193-66-09 05:42:00





             Test Item    Value        Reference Range Interpretation Comments

 

             Eosinophils (test code = 2.9          See_Comment                [A

utomated message] The



             Eosinophils)                                        system which ge

nerated



                                                                 this result tra

nsmitted



                                                                 reference range

: <=4.0.



                                                                 The reference r

zhen was



                                                                 not used to int

erpret



                                                                 this result as



                                                                 normal/abnormal

.



[facility] ArlingtonGullxfiUPMTFTXILL6672-13-86 05:42:00





             Test Item    Value        Reference Range Interpretation Comments

 

             Segs (test code = Segs) 62.2         45.0-75.0                 



[facility] ArlingtonPttdypiBOQTFVQLLG9787-03-14 05:42:00





             Test Item    Value        Reference Range Interpretation Comments

 

             Lymphocytes (test code = Lymphocytes) 24.9         20.0-40.0       

          



[facility] ArlingtonKaniewpSCIWRJLFKW5498-84-11 05:42:00





             Test Item    Value        Reference Range Interpretation Comments

 

             MCH (test code = MCH) 27.5 pg      27.0-31.0                 



[facility] ArlingtonNqlzpmwGAGOHAQNZS1691-16-14 05:42:00





             Test Item    Value        Reference Range Interpretation Comments

 

             MCV (test code = MCV) 85.3         80.0-94.0                 



[facility] ArlingtonTnsqbtfONVDPCEPHN2523-17-57 05:42:00





             Test Item    Value        Reference Range Interpretation Comments

 

             Hct (test code = Hct) 43.9         42.0-54.0                 



[facility] ArlingtonOrkyjokKFCYTPCAIC7076-77-82 05:42:00





             Test Item    Value        Reference Range Interpretation Comments

 

             Hgb (test code = Hgb) 14.2         14.0-18.0                 



[facility] ArlingtonSnemlcuVSTMHCGIVC5438-80-16 05:42:00





             Test Item    Value        Reference Range Interpretation Comments

 

             WBC (test code = WBC) 7.7          3.7-10.4                  



[facility] ArlingtonDpkenmwQMZFZWNLFI6904-87-14 05:42:00





             Test Item    Value        Reference Range Interpretation Comments

 

             RBC (test code = RBC) 5.15         4.70-6.10                 



[facility] ArlingtonIdqyflqCGNICHRFUN9306-43-32 05:42:00





             Test Item    Value        Reference Range Interpretation Comments

 

             MPV (test code = MPV) 8.4          7.4-10.4                  



[facility] ArlingtonBsnvzfwWXYOAPMEEU4940-59-82 05:42:00





             Test Item    Value        Reference Range Interpretation Comments

 

             MCHC (test code = MCHC) 32.3         32.0-36.0                 



[facility] ArlingtonRyzitfvKPSMDFCHYO5670-21-82 05:42:00





             Test Item    Value        Reference Range Interpretation Comments

 

             RDW (test code = RDW) 17.3         11.5-14.5                 



[facility] ArlingtonGdgwunkJBKXDUKFZP8312-26-09 05:42:00





             Test Item    Value        Reference Range Interpretation Comments

 

             Platelet (test code = Platelet) 317          133-450               

    



[facility]2018-05-07 09:36:00





             Test Item    Value        Reference Range Interpretation Comments

 

             Globulin (test code = Globulin) 4.4          2.7-4.2               

    



[facility]2018-05-07 09:36:00





             Test Item    Value        Reference Range Interpretation Comments

 

             A/G Ratio (test code = A/G Ratio) 0.6 1        0.7-1.6             

      



[place] 983248-05-07 09:36:00





             Test Item    Value        Reference Range Interpretation Comments

 

             B/C Ratio (test code = B/C Ratio) 17 1         6-25                

      



[facility]2018-05-07 09:36:00





             Test Item    Value        Reference Range Interpretation Comments

 

             AGAP (test code = AGAP) 11.3         10.0-20.0                 



[facility]2018-05-07 09:36:00





             Test Item    Value        Reference Range Interpretation Comments

 

             eGFR (test code = eGFR) 134                                    



[facility]2018-05-07 09:36:00





             Test Item    Value        Reference Range Interpretation Comments

 

             Creatinine Lvl (test code = Creatinine 0.84         0.50-1.40      

           



             Lvl)                                                



[facility]2018-05-07 09:36:00





             Test Item    Value        Reference Range Interpretation Comments

 

             Sodium Lvl (test code = Sodium Lvl) 142          135-145           

        



[facility]2018-05-07 09:36:00





             Test Item    Value        Reference Range Interpretation Comments

 

             Glucose Lvl (test code = Glucose Lvl) 99           70-99           

          



[facility]2018-05-07 09:36:00





             Test Item    Value        Reference Range Interpretation Comments

 

             BUN (test code = BUN) 14           7-22                      



[facility]2018-05-07 09:36:00





             Test Item    Value        Reference Range Interpretation Comments

 

             Alk Phos (test code = Alk Phos) 79                           

    



[facility]2018-05-07 09:36:00





             Test Item    Value        Reference Range Interpretation Comments

 

             Bili Total (test code = Bili Total) 0.3          0.2-1.3           

        



[facility]2018-05-07 09:36:00





             Test Item    Value        Reference Range Interpretation Comments

 

             AST (test code = AST) 14           See_Comment                [Auto

mated message] The



                                                                 system which ge

nerated this



                                                                 result transmit

huma



                                                                 reference range

: <=37. The



                                                                 reference range

 was not



                                                                 used to interpr

et this



                                                                 result as zayda

l/abnormal.



[place] 983248-05-07 09:36:00





             Test Item    Value        Reference Range Interpretation Comments

 

             ALT (test code = ALT) 43           See_Comment                [Auto

mated message] The



                                                                 system which ge

nerated this



                                                                 result transmit

huma



                                                                 reference range

: <=65. The



                                                                 reference range

 was not



                                                                 used to interpr

et this



                                                                 result as zayda

l/abnormal.



[facility]2018-05-07 09:36:00





             Test Item    Value        Reference Range Interpretation Comments

 

             Total Protein (test code = Total 7.1          6.4-8.4              

     



             Protein)                                            



[place] 983248-05-07 09:36:00





             Test Item    Value        Reference Range Interpretation Comments

 

             Albumin Lvl (test code = Albumin Lvl) 2.7          3.5-5.0         

          



[place] 983248-05-07 09:36:00





             Test Item    Value        Reference Range Interpretation Comments

 

             Calcium Lvl (test code = Calcium Lvl) 9.2          8.5-10.5        

          



[facility]2018-05-07 09:36:00





             Test Item    Value        Reference Range Interpretation Comments

 

             CO2 (test code = CO2) 21           24-32                     



[facility]2018-05-07 09:36:00





             Test Item    Value        Reference Range Interpretation Comments

 

             Potassium Lvl (test code = Potassium 4.3          3.5-5.1          

         



             Lvl)                                                



[place] 983248-05-07 09:36:00





             Test Item    Value        Reference Range Interpretation Comments

 

             Chloride Lvl (test code = Chloride Lvl) 114                  

            



[facility] ArlingtonEywruftBXPPESCTWI3314-49-58 09:36:00





             Test Item    Value        Reference Range Interpretation Comments

 

             MCHC (test code = MCHC) 32.5         32.0-36.0                 



[facility] ArlingtonXxgnsxdBEQWNRNONG4378-49-39 09:36:00





             Test Item    Value        Reference Range Interpretation Comments

 

             RDW (test code = RDW) 17.5         11.5-14.5                 



[address]05-07 09:36:00





             Test Item    Value        Reference Range Interpretation Comments

 

             Platelet (test code = Platelet) 400          133-450               

    



[facility] ArlingtonAxkrhyqFGSAVBWBXG9354-44-21 09:36:00





             Test Item    Value        Reference Range Interpretation Comments

 

             MPV (test code = MPV) 8.5          7.4-10.4                  



[facility] ArlingtonYffkyboRORMOEVUDG3769-41-10 09:36:00





             Test Item    Value        Reference Range Interpretation Comments

 

             WBC (test code = WBC) 6.6          3.7-10.4                  



[facility] ArlingtonRgucjncMJWXIKCULE9330-81-06 09:36:00





             Test Item    Value        Reference Range Interpretation Comments

 

             RBC (test code = RBC) 5.17         4.70-6.10                 



[facility] ArlingtonIkdsgjsREWTXYJUQV2256-20-54 09:36:00





             Test Item    Value        Reference Range Interpretation Comments

 

             MCV (test code = MCV) 86.1         80.0-94.0                 



[facility] ArlingtonTvtjekiBPXFDHWQOG9198-97-67 09:36:00





             Test Item    Value        Reference Range Interpretation Comments

 

             Hct (test code = Hct) 44.5         42.0-54.0                 



[facility] ArlingtonNyhcsqoZFGOLBHHJU8109-15-96 09:36:00





             Test Item    Value        Reference Range Interpretation Comments

 

             MCH (test code = MCH) 28.0 pg      27.0-31.0                 



[facility] ArlingtonQhwrrwhFYRQHUDNXV8360-97-75 09:36:00





             Test Item    Value        Reference Range Interpretation Comments

 

             Hgb (test code = Hgb) 14.5         14.0-18.0                 



[facility] ArlingtonKxabesgKEIHCJVBXL3682-13-46 09:36:00





             Test Item    Value        Reference Range Interpretation Comments

 

             Lymphocytes # (test code = Lymphocytes 1.7          1.0-5.5        

           



             #)                                                  



[facility] ArlingtonOlzbvceFKLGUHNRPT9268-68-60 09:36:00





             Test Item    Value        Reference Range Interpretation Comments

 

             Monocytes # (test code 0.7          See_Comment                [Aut

omated message] The



             = Monocytes #)                                        system which 

generated



                                                                 this result tra

nsmitted



                                                                 reference range

: <=0.8.



                                                                 The reference r

zhen was



                                                                 not used to int

erpret



                                                                 this result as



                                                                 normal/abnormal

.



[facility] ArlingtonQcimetjQUYXMTMHMU9937-35-18 09:36:00





             Test Item    Value        Reference Range Interpretation Comments

 

             Eosinophils # (test code 0.2          See_Comment                [A

utomated message] The



             = Eosinophils #)                                        system whic

h generated



                                                                 this result tra

nsmitted



                                                                 reference range

: <=0.5.



                                                                 The reference r

zhen was



                                                                 not used to int

erpret



                                                                 this result as



                                                                 normal/abnormal

.



[facility] ArlingtonUkdpaleRPUQDYRCAA0077-09-33 09:36:00





             Test Item    Value        Reference Range Interpretation Comments

 

             Lymphocytes (test code = Lymphocytes) 26.1         20.0-40.0       

          



[facility] ArlingtonLjktuarODBGFNENGG7386-77-06 09:36:00





             Test Item    Value        Reference Range Interpretation Comments

 

             Segs (test code = Segs) 59.3         45.0-75.0                 



[facility] ArlingtonVksvmyeHBUSQEOVBZ8189-85-91 09:36:00





             Test Item    Value        Reference Range Interpretation Comments

 

             Basophils (test code = 0.8          See_Comment                [Aut

omated message] The



             Basophils)                                          system which ge

nerated



                                                                 this result tra

nsmitted



                                                                 reference range

: <=1.0.



                                                                 The reference r

zhen was



                                                                 not used to int

erpret



                                                                 this result as



                                                                 normal/abnormal

.



[place] 889098-05-07 09:36:00





             Test Item    Value        Reference Range Interpretation Comments

 

             Monocytes (test code = Monocytes) 10.5         2.0-12.0            

      



[facility] ArlingtonSmtolzcFKQDVIHOFS5084-51-92 09:36:00





             Test Item    Value        Reference Range Interpretation Comments

 

             Eosinophils (test code = 3.3          See_Comment                [A

utomated message] The



             Eosinophils)                                        system which ge

nerated



                                                                 this result tra

nsmitted



                                                                 reference range

: <=4.0.



                                                                 The reference r

zhen was



                                                                 not used to int

erpret



                                                                 this result as



                                                                 normal/abnormal

.



[facility] ArlingtonJfymqpeJBNKNCMEKJ1239-40-46 09:36:00





             Test Item    Value        Reference Range Interpretation Comments

 

             Segs-Bands # (test code = Segs-Bands #) 3.9          1.5-8.1       

            



[facility] [facility] 09:36:00





             Test Item    Value        Reference Range Interpretation Comments

 

             Globulin (test code = Globulin) 4.4          2.7-4.2               

    



[facility]2018-05-07 09:36:00





             Test Item    Value        Reference Range Interpretation Comments

 

             A/G Ratio (test code = A/G Ratio) 0.6 1        0.7-1.6             

      



[place] 983248-05-07 09:36:00





             Test Item    Value        Reference Range Interpretation Comments

 

             B/C Ratio (test code = B/C Ratio) 17 1         6-25                

      



[place] 983248-05-07 09:36:00





             Test Item    Value        Reference Range Interpretation Comments

 

             AGAP (test code = AGAP) 11.3         10.0-20.0                 



[facility]2018-05-07 09:36:00





             Test Item    Value        Reference Range Interpretation Comments

 

             eGFR (test code = eGFR) 134                                    



[facility]2018-05-07 09:36:00





             Test Item    Value        Reference Range Interpretation Comments

 

             Creatinine Lvl (test code = Creatinine 0.84         0.50-1.40      

           



             Lvl)                                                



[place] 983248-05-07 09:36:00





             Test Item    Value        Reference Range Interpretation Comments

 

             Sodium Lvl (test code = Sodium Lvl) 142          135-145           

        



[place] [address]-05-07 09:36:00





             Test Item    Value        Reference Range Interpretation Comments

 

             Glucose Lvl (test code = Glucose Lvl) 99           70-99           

          



[facility]2018-05-07 09:36:00





             Test Item    Value        Reference Range Interpretation Comments

 

             BUN (test code = BUN) 14           7-22                      



[place] 983248-05-07 09:36:00





             Test Item    Value        Reference Range Interpretation Comments

 

             Alk Phos (test code = Alk Phos) 79                           

    



[place] 983248-05-07 09:36:00





             Test Item    Value        Reference Range Interpretation Comments

 

             Bili Total (test code = Bili Total) 0.3          0.2-1.3           

        



[place] 983248-05-07 09:36:00





             Test Item    Value        Reference Range Interpretation Comments

 

             AST (test code = AST) 14           See_Comment                [Auto

mated message] The



                                                                 system which ge

nerated this



                                                                 result transmit

huma



                                                                 reference range

: <=37. The



                                                                 reference range

 was not



                                                                 used to interpr

et this



                                                                 result as zayda

l/abnormal.



[place] 983248-05-07 09:36:00





             Test Item    Value        Reference Range Interpretation Comments

 

             ALT (test code = ALT) 43           See_Comment                [Auto

mated message] The



                                                                 system which ge

nerated this



                                                                 result transmit

huma



                                                                 reference range

: <=65. The



                                                                 reference range

 was not



                                                                 used to interpr

et this



                                                                 result as zayda

l/abnormal.



[place] [address]-05-07 09:36:00





             Test Item    Value        Reference Range Interpretation Comments

 

             Total Protein (test code = Total 7.1          6.4-8.4              

     



             Protein)                                            



[place] 983248-05-07 09:36:00





             Test Item    Value        Reference Range Interpretation Comments

 

             Albumin Lvl (test code = Albumin Lvl) 2.7          3.5-5.0         

          



[facility]2018-05-07 09:36:00





             Test Item    Value        Reference Range Interpretation Comments

 

             Calcium Lvl (test code = Calcium Lvl) 9.2          8.5-10.5        

          



[facility]2018-05-07 09:36:00





             Test Item    Value        Reference Range Interpretation Comments

 

             CO2 (test code = CO2) 21           24-32                     



[facility]2018-05-07 09:36:00





             Test Item    Value        Reference Range Interpretation Comments

 

             Potassium Lvl (test code = Potassium 4.3          3.5-5.1          

         



             Lvl)                                                



[facility]2018-05-07 09:36:00





             Test Item    Value        Reference Range Interpretation Comments

 

             Chloride Lvl (test code = Chloride Lvl) 114                  

            



[facility]75[facility]





             Test Item    Value        Reference Range Interpretation Comments

 

             MCHC (test code = MCHC) 32.5         32.0-36.0                 



[facility]oxpawlNNRQSOKZXT5269-14-22 09:36:00





             Test Item    Value        Reference Range Interpretation Comments

 

             RDW (test code = RDW) 17.5         11.5-14.5                 



[facility]howkddPGNRKXETPC7300-69-66 09:36:00





             Test Item    Value        Reference Range Interpretation Comments

 

             Platelet (test code = Platelet) 400          133-450               

    



[facility]HffokfxBINPIPWONW9697-51-45 09:36:00





             Test Item    Value        Reference Range Interpretation Comments

 

             MPV (test code = MPV) 8.5          7.4-10.4                  



[facility] ArlingtonJufnsugKVGQOLXGLN7176-24-54 09:36:00





             Test Item    Value        Reference Range Interpretation Comments

 

             WBC (test code = WBC) 6.6          3.7-10.4                  



[facility]IytiwztAEUMFRDIWV3000-06-79 09:36:00





             Test Item    Value        Reference Range Interpretation Comments

 

             RBC (test code = RBC) 5.17         4.70-6.10                 



[facility]ftbzgvFBYLEDFLBC5023-98-06 09:36:00





             Test Item    Value        Reference Range Interpretation Comments

 

             MCV (test code = MCV) 86.1         80.0-94.0                 



[place] 889098-05-07 09:36:00





             Test Item    Value        Reference Range Interpretation Comments

 

             Hct (test code = Hct) 44.5         42.0-54.0                 



[facility]jpplonULEUVNGMGL0441-13-30 09:36:00





             Test Item    Value        Reference Range Interpretation Comments

 

             MCH (test code = MCH) 28.0 pg      27.0-31.0                 



[facility] ArlingtonXaleygnMIFKSMYXBA0851-35-36 09:36:00





             Test Item    Value        Reference Range Interpretation Comments

 

             Hgb (test code = Hgb) 14.5         14.0-18.0                 



[facility]chmdcsQHIJIUVVJS9917-93-34 09:36:00





             Test Item    Value        Reference Range Interpretation Comments

 

             Lymphocytes # (test code = Lymphocytes 1.7          1.0-5.5        

           



             #)                                                  



[facility]QjxpnboFRFWWYJNUS1363-92-96 09:36:00





             Test Item    Value        Reference Range Interpretation Comments

 

             Monocytes # (test code 0.7          See_Comment                [Aut

omated message] The



             = Monocytes #)                                        system which 

generated



                                                                 this result tra

nsmitted



                                                                 reference range

: <=0.8.



                                                                 The reference r

zhen was



                                                                 not used to int

erpret



                                                                 this result as



                                                                 normal/abnormal

.



[facility] ArlingtonQkcxybzJWKPGDWFMR4962-52-99 09:36:00





             Test Item    Value        Reference Range Interpretation Comments

 

             Eosinophils # (test code 0.2          See_Comment                [A

utomated message] The



             = Eosinophils #)                                        system whic

h generated



                                                                 this result tra

nsmitted



                                                                 reference range

: <=0.5.



                                                                 The reference r

zhen was



                                                                 not used to int

erpret



                                                                 this result as



                                                                 normal/abnormal

.



[facility] ArlingtonVwisemnKWEKNOOCZP5209-12-60 09:36:00





             Test Item    Value        Reference Range Interpretation Comments

 

             Lymphocytes (test code = Lymphocytes) 26.1         20.0-40.0       

          



[facility] ArlingtonZvfygxfIYOWQLYMVD0405-12-12 09:36:00





             Test Item    Value        Reference Range Interpretation Comments

 

             Segs (test code = Segs) 59.3         45.0-75.0                 



[facility] ArlingtonCpfvzctXWQTZVNHYC6012-89-96 09:36:00





             Test Item    Value        Reference Range Interpretation Comments

 

             Basophils (test code = 0.8          See_Comment                [Aut

omated message] The



             Basophils)                                          system which ge

nerated



                                                                 this result tra

nsmitted



                                                                 reference range

: <=1.0.



                                                                 The reference r

zhen was



                                                                 not used to int

erpret



                                                                 this result as



                                                                 normal/abnormal

.



[facility] ArlingtonGqbmwcdPTEQSUOTGR6111-79-04 09:36:00





             Test Item    Value        Reference Range Interpretation Comments

 

             Monocytes (test code = Monocytes) 10.5         2.0-12.0            

      



[facility] ArlingtonFfotmxuTFDVYBSCZC7189-76-78 09:36:00





             Test Item    Value        Reference Range Interpretation Comments

 

             Eosinophils (test code = 3.3          See_Comment                [A

utomated message] The



             Eosinophils)                                        system which ge

nerated



                                                                 this result tra

nsmitted



                                                                 reference range

: <=4.0.



                                                                 The reference r

zhen was



                                                                 not used to int

erpret



                                                                 this result as



                                                                 normal/abnormal

.



[facility] ArlingtonKsfowyjWCMMUCDMFP5636-34-09 09:36:00





             Test Item    Value        Reference Range Interpretation Comments

 

             Segs-Bands # (test code = Segs-Bands #) 3.9          1.5-8.1       

            



[place] 818678-05-06 21:02:00





             Test Item    Value        Reference Range Interpretation Comments

 

             Vanco Tr TND (test code = Vanco Tr 15:30pm                         

       



             TND)                                                



[place] [id number] 21:02:00





             Test Item    Value        Reference Range Interpretation Comments

 

             Vanco Tr (test code = Vanco Tr) 9.1                                

    



[place] [id number] 21:02:00





             Test Item    Value        Reference Range Interpretation Comments

 

             Vanco Tr TND (test code = Vanco Tr 15:30pm                         

       



             TND)                                                



[place] [id number] 21:02:00





             Test Item    Value        Reference Range Interpretation Comments

 

             Vanco Tr (test code = Vanco Tr) 9.1                                

    



[facility]2018-05-06 07:07:00





             Test Item    Value        Reference Range Interpretation Comments

 

             Glucose Lvl (test code = Glucose Lvl) 74           70-99           

          



[facility]2018-05-06 07:07:00





             Test Item    Value        Reference Range Interpretation Comments

 

             BUN (test code = BUN) 12           7-22                      



[facility]2018-05-06 07:07:00





             Test Item    Value        Reference Range Interpretation Comments

 

             Creatinine Lvl (test code = Creatinine 0.75         0.50-1.40      

           



             Lvl)                                                



[facility]2018-05-06 07:07:00





             Test Item    Value        Reference Range Interpretation Comments

 

             eGFR (test code = eGFR) 140                                    



[facility]2018-05-06 07:07:00





             Test Item    Value        Reference Range Interpretation Comments

 

             Albumin Lvl (test code = Albumin Lvl) 2.9          3.5-5.0         

          



[facility]2018-05-06 07:07:00





             Test Item    Value        Reference Range Interpretation Comments

 

             Globulin (test code = Globulin) 4.4          2.7-4.2               

    



[place] 983248-05-06 07:07:00





             Test Item    Value        Reference Range Interpretation Comments

 

             A/G Ratio (test code = A/G Ratio) 0.7 1        0.7-1.6             

      



[place] [address]-05-06 07:07:00





             Test Item    Value        Reference Range Interpretation Comments

 

             Bili Total (test code = Bili Total) 0.4          0.2-1.3           

        



[place] [address]-05-06 07:07:00





             Test Item    Value        Reference Range Interpretation Comments

 

             Alk Phos (test code = Alk Phos) 71                           

    



[place] [address]-05-06 07:07:00





             Test Item    Value        Reference Range Interpretation Comments

 

             AST (test code = AST) 15           See_Comment                [Auto

mated message] The



                                                                 system which ge

nerated this



                                                                 result transmit

huma



                                                                 reference range

: <=37. The



                                                                 reference range

 was not



                                                                 used to interpr

et this



                                                                 result as zayda

l/abnormal.



[place] [address]-05-06 07:07:00





             Test Item    Value        Reference Range Interpretation Comments

 

             ALT (test code = ALT) 28           See_Comment                [Auto

mated message] The



                                                                 system which ge

nerated this



                                                                 result transmit

huma



                                                                 reference range

: <=65. The



                                                                 reference range

 was not



                                                                 used to interpr

et this



                                                                 result as zayda

l/abnormal.



[place] 983248-05-06 07:07:00





             Test Item    Value        Reference Range Interpretation Comments

 

             Potassium Lvl (test code = Potassium 4.4          3.5-5.1          

         



             Lvl)                                                



[place] 983248-05-06 07:07:00





             Test Item    Value        Reference Range Interpretation Comments

 

             Sodium Lvl (test code = Sodium Lvl) 147          135-145           

        



[place] 983248-05-06 07:07:00





             Test Item    Value        Reference Range Interpretation Comments

 

             CO2 (test code = CO2) 25           24-32                     



[place] 983248-05-06 07:07:00





             Test Item    Value        Reference Range Interpretation Comments

 

             Chloride Lvl (test code = Chloride Lvl) 114                  

            



[place] [address]-05-06 07:07:00





             Test Item    Value        Reference Range Interpretation Comments

 

             B/C Ratio (test code = B/C Ratio) 16 1         6-25                

      



[facility]2018-05-06 07:07:00





             Test Item    Value        Reference Range Interpretation Comments

 

             Calcium Lvl (test code = Calcium Lvl) 8.7          8.5-10.5        

          



[facility]2018-05-06 07:07:00





             Test Item    Value        Reference Range Interpretation Comments

 

             AGAP (test code = AGAP) 12.4         10.0-20.0                 



[facility]2018-05-06 07:07:00





             Test Item    Value        Reference Range Interpretation Comments

 

             Total Protein (test code = Total 7.3          6.4-8.4              

     



             Protein)                                            



[facility] ArlingtonNtvebbqOTMNVZRZNF7985-20-43 07:07:00





             Test Item    Value        Reference Range Interpretation Comments

 

             Platelet (test code = Platelet) 345          133-450               

    



[facility] ArlingtonKnvsbzaLRQAZXZTVQ9223-51-47 07:07:00





             Test Item    Value        Reference Range Interpretation Comments

 

             MPV (test code = MPV) 8.7          7.4-10.4                  



[facility] ArlingtonYffxgmiQZJAHROVUJ3048-37-39 07:07:00





             Test Item    Value        Reference Range Interpretation Comments

 

             WBC (test code = WBC) 6.9          3.7-10.4                  



[facility] ArlingtonKimgqesLMFFGCDJZE3983-76-48 07:07:00





             Test Item    Value        Reference Range Interpretation Comments

 

             RBC (test code = RBC) 5.30         4.70-6.10                 



[facility] ArlingtonPkpgivyZEYANLFKVC0782-45-53 07:07:00





             Test Item    Value        Reference Range Interpretation Comments

 

             MCHC (test code = MCHC) 32.8         32.0-36.0                 



[facility] ArlingtonZjkgqrsHIAVDTBQDG9423-01-57 07:07:00





             Test Item    Value        Reference Range Interpretation Comments

 

             MCH (test code = MCH) 27.9 pg      27.0-31.0                 



[facility] ArlingtonMndqrnfDPTDKPLMBT3957-03-78 07:07:00





             Test Item    Value        Reference Range Interpretation Comments

 

             Hgb (test code = Hgb) 14.8         14.0-18.0                 



[facility] ArlingtonGaqxdjhLURIFZXDVB8481-05-21 07:07:00





             Test Item    Value        Reference Range Interpretation Comments

 

             MCV (test code = MCV) 85.0         80.0-94.0                 



[facility] ArlingtonCvmegxmSUHFNUXAPX7153-20-69 07:07:00





             Test Item    Value        Reference Range Interpretation Comments

 

             Hct (test code = Hct) 45.1         42.0-54.0                 



[facility] ArlingtonByghlblOPPSYFIFRN3868-99-63 07:07:00





             Test Item    Value        Reference Range Interpretation Comments

 

             RDW (test code = RDW) 17.5         11.5-14.5                 



[facility] ArlingtonOoaaekiUEVPWMTJVN0566-17-12 07:07:00





             Test Item    Value        Reference Range Interpretation Comments

 

             Eosinophils # (test code 0.2          See_Comment                [A

utomated message] The



             = Eosinophils #)                                        system [facility] generated



                                                                 this result tra

nsmitted



                                                                 reference range

: <=0.5.



                                                                 The reference r

zhen was



                                                                 not used to int

erpret



                                                                 this result as



                                                                 normal/abnormal

.



[facility] ArlingtonKqbmhqdGHFEPOPMQB2898-12-78 07:07:00





             Test Item    Value        Reference Range Interpretation Comments

 

             Lymphocytes # (test code = Lymphocytes 2.0          1.0-5.5        

           



             #)                                                  



[facility] ArlingtonRtahukhIEGAOZPSVP8847-72-21 07:07:00





             Test Item    Value        Reference Range Interpretation Comments

 

             Monocytes # (test code 0.7          See_Comment                [Aut

omated message] The



             = Monocytes #)                                        system which 

generated



                                                                 this result tra

nsmitted



                                                                 reference range

: <=0.8.



                                                                 The reference r

zhen was



                                                                 not used to int

erpret



                                                                 this result as



                                                                 normal/abnormal

.



[facility] ArlingtonUeesbadHOMYNXWZOB3103-94-14 07:07:00





             Test Item    Value        Reference Range Interpretation Comments

 

             Eosinophils (test code = 2.4          See_Comment                [A

utomated message] The



             Eosinophils)                                        system which ge

nerated



                                                                 this result tra

nsmitted



                                                                 reference range

: <=4.0.



                                                                 The reference r

zhen was



                                                                 not used to int

erpret



                                                                 this result as



                                                                 normal/abnormal

.



[facility]ltqeauGDZMBTZJGX9697[facility]26-69 07:07:00





             Test Item    Value        Reference Range Interpretation Comments

 

             Basophils (test code = 0.6          See_Comment                [Aut

omated message] The



             Basophils)                                          system which ge

nerated



                                                                 this result tra

nsmitted



                                                                 reference range

: <=1.0.



                                                                 The reference r

zhen was



                                                                 not used to int

erpret



                                                                 this result as



                                                                 normal/abnormal

.



[facility] ArlingtonRlfruqoRHFPXKFJPL0671-95-72 07:07:00





             Test Item    Value        Reference Range Interpretation Comments

 

             Segs-Bands # (test code = Segs-Bands #) 4.0          1.5-8.1       

            



[facility] ArlingtonCirhxsdIGKUKQZIXN8404-81-00 07:07:00





             Test Item    Value        Reference Range Interpretation Comments

 

             Monocytes (test code = Monocytes) 10.4         2.0-12.0            

      



[facility] ArlingtonXhfhtgmICJFPIKKZY2570-98-69 07:07:00





             Test Item    Value        Reference Range Interpretation Comments

 

             RBC Morph (test code = Normal (18 2:07 AM)                     

      



             RBC Morph)                                          



[facility] ArlingtonTwkujxyJFAAGCFHBF1860-89-08 07:07:00





             Test Item    Value        Reference Range Interpretation Comments

 

             Segs (test code = Segs) 58.1         45.0-75.0                 



[facility] ArlingtonIkuwpdfGZDNPXFHHX9710-87-12 07:07:00





             Test Item    Value        Reference Range Interpretation Comments

 

             Plt Morph (test code = Normal (18 2:07 AM)                     

      



             Plt Morph)                                          



[facility] ArlingtonYdjgfijDSQVICVBXS2677-29-11 07:07:00





             Test Item    Value        Reference Range Interpretation Comments

 

             Lymphocytes (test code = Lymphocytes) 28.5         20.0-40.0       

          



[facility]2018-05-06 07:07:00





             Test Item    Value        Reference Range Interpretation Comments

 

             Glucose Lvl (test code = Glucose Lvl) 74           70-99           

          



[facility]2018-05-06 07:07:00





             Test Item    Value        Reference Range Interpretation Comments

 

             BUN (test code = BUN) 12           7-22                      



[facility]2018-05-06 07:07:00





             Test Item    Value        Reference Range Interpretation Comments

 

             Creatinine Lvl (test code = Creatinine 0.75         0.50-1.40      

           



             Lvl)                                                



[facility]2018-05-06 07:07:00





             Test Item    Value        Reference Range Interpretation Comments

 

             eGFR (test code = eGFR) 140                                    



[facility]2018-05-06 07:07:00





             Test Item    Value        Reference Range Interpretation Comments

 

             Albumin Lvl (test code = Albumin Lvl) 2.9          3.5-5.0         

          



[facility]2018-05-06 07:07:00





             Test Item    Value        Reference Range Interpretation Comments

 

             Globulin (test code = Globulin) 4.4          2.7-4.2               

    



[place] 983248-05-06 07:07:00





             Test Item    Value        Reference Range Interpretation Comments

 

             A/G Ratio (test code = A/G Ratio) 0.7 1        0.7-1.6             

      



[place] 983248-05-06 07:07:00





             Test Item    Value        Reference Range Interpretation Comments

 

             Bili Total (test code = Bili Total) 0.4          0.2-1.3           

        



[place] 983248-05-06 07:07:00





             Test Item    Value        Reference Range Interpretation Comments

 

             Alk Phos (test code = Alk Phos) 71                           

    



[facility]2018-05-06 07:07:00





             Test Item    Value        Reference Range Interpretation Comments

 

             AST (test code = AST) 15           See_Comment                [Auto

mated message] The



                                                                 system which ge

nerated this



                                                                 result transmit

huma



                                                                 reference range

: <=37. The



                                                                 reference range

 was not



                                                                 used to interpr

et this



                                                                 result as zayda

l/abnormal.



[facility]2018-05-06 07:07:00





             Test Item    Value        Reference Range Interpretation Comments

 

             ALT (test code = ALT) 28           See_Comment                [Auto

mated message] The



                                                                 system which ge

nerated this



                                                                 result transmit

huma



                                                                 reference range

: <=65. The



                                                                 reference range

 was not



                                                                 used to interpr

et this



                                                                 result as zayda

l/abnormal.



[facility]2018-05-06 07:07:00





             Test Item    Value        Reference Range Interpretation Comments

 

             Potassium Lvl (test code = Potassium 4.4          3.5-5.1          

         



             Lvl)                                                



[facility]2018-05-06 07:07:00





             Test Item    Value        Reference Range Interpretation Comments

 

             Sodium Lvl (test code = Sodium Lvl) 147          135-145           

        



[facility]2018-05-06 07:07:00





             Test Item    Value        Reference Range Interpretation Comments

 

             CO2 (test code = CO2) 25           24-32                     



[facility]2018-05-06 07:07:00





             Test Item    Value        Reference Range Interpretation Comments

 

             Chloride Lvl (test code = Chloride Lvl) 114                  

            



[facility]2018-05-06 07:07:00





             Test Item    Value        Reference Range Interpretation Comments

 

             B/C Ratio (test code = B/C Ratio) 16 1         6-25                

      



[place] 983248-05-06 07:07:00





             Test Item    Value        Reference Range Interpretation Comments

 

             Calcium Lvl (test code = Calcium Lvl) 8.7          8.5-10.5        

          



[facility]2018-05-06 07:07:00





             Test Item    Value        Reference Range Interpretation Comments

 

             AGAP (test code = AGAP) 12.4         10.0-20.0                 



[facility]2018-05-06 07:07:00





             Test Item    Value        Reference Range Interpretation Comments

 

             Total Protein (test code = Total 7.3          6.4-8.4              

     



             Protein)                                            



[place] 889098-05-06 07:07:00





             Test Item    Value        Reference Range Interpretation Comments

 

             Platelet (test code = Platelet) 345          133-450               

    



[facility] ArlingtonZndgsfsHNKVOYPQGW5222-03-08 07:07:00





             Test Item    Value        Reference Range Interpretation Comments

 

             MPV (test code = MPV) 8.7          7.4-10.4                  



[facility] ArlingtonNuuzhcxJFKFCOANPG8354-79-40 07:07:00





             Test Item    Value        Reference Range Interpretation Comments

 

             WBC (test code = WBC) 6.9          3.7-10.4                  



[facility] ArlingtonJbjfxdiCNBFOANEIV8778-32-08 07:07:00





             Test Item    Value        Reference Range Interpretation Comments

 

             RBC (test code = RBC) 5.30         4.70-6.10                 



[facility] ArlingtonFssdicsCWFRZASTZY9912-01-29 07:07:00





             Test Item    Value        Reference Range Interpretation Comments

 

             MCHC (test code = MCHC) 32.8         32.0-36.0                 



[facility] ArlingtonNujofgjZRDBNSLBYB1967-83-04 07:07:00





             Test Item    Value        Reference Range Interpretation Comments

 

             MCH (test code = MCH) 27.9 pg      27.0-31.0                 



[facility] ArlingtonCdtbwydPIPLNLWBWG3988-66-97 07:07:00





             Test Item    Value        Reference Range Interpretation Comments

 

             Hgb (test code = Hgb) 14.8         14.0-18.0                 



[facility] ArlingtonRpxggbvJYDAAGPBAA3384-77-86 07:07:00





             Test Item    Value        Reference Range Interpretation Comments

 

             MCV (test code = MCV) 85.0         80.0-94.0                 



[facility] ArlingtonJoyebcmECELIYQUPO0951-27-04 07:07:00





             Test Item    Value        Reference Range Interpretation Comments

 

             Hct (test code = Hct) 45.1         42.0-54.0                 



[facility] ArlingtonVrwlequKLVWKIEUJY8702-81-48 07:07:00





             Test Item    Value        Reference Range Interpretation Comments

 

             RDW (test code = RDW) 17.5         11.5-14.5                 



[facility] ArlingtonFelrvuzSDQFUFXKQT4395-64-64 07:07:00





             Test Item    Value        Reference Range Interpretation Comments

 

             Eosinophils # (test code 0.2          See_Comment                [A

utomated message] The



             = Eosinophils #)                                        system whic

h generated



                                                                 this result tra

nsmitted



                                                                 reference range

: <=0.5.



                                                                 The reference r

zhen was



                                                                 not used to int

erpret



                                                                 this result as



                                                                 normal/abnormal

.



[facility] ArlingtonAmpogzxNSGHHDGBKZ9749-62-19 07:07:00





             Test Item    Value        Reference Range Interpretation Comments

 

             Lymphocytes # (test code = Lymphocytes 2.0          1.0-5.5        

           



             #)                                                  



[facility] ArlingtonYpfxfriZJYCCIREFA5349-61-09 07:07:00





             Test Item    Value        Reference Range Interpretation Comments

 

             Monocytes # (test code 0.7          See_Comment                [Aut

omated message] The



             = Monocytes #)                                        system which 

generated



                                                                 this result tra

nsmitted



                                                                 reference range

: <=0.8.



                                                                 The reference r

zhen was



                                                                 not used to int

erpret



                                                                 this result as



                                                                 normal/abnormal

.



[facility] ArlingtonNwafxevJUJHGELZPR8588-80-01 07:07:00





             Test Item    Value        Reference Range Interpretation Comments

 

             Eosinophils (test code = 2.4          See_Comment                [A

utomated message] The



             Eosinophils)                                        system which ge

nerated



                                                                 this result tra

nsmitted



                                                                 reference range

: <=4.0.



                                                                 The reference r

zhen was



                                                                 not used to int

erpret



                                                                 this result as



                                                                 normal/abnormal

.



[facility] ArlingtonNqbdkwpABJZMZMACY5366-14-74 07:07:00





             Test Item    Value        Reference Range Interpretation Comments

 

             Basophils (test code = 0.6          See_Comment                [Aut

omated message] The



             Basophils)                                          system which ge

nerated



                                                                 this result tra

nsmitted



                                                                 reference range

: <=1.0.



                                                                 The reference r

zhen was



                                                                 not used to int

erpret



                                                                 this result as



                                                                 normal/abnormal

.



[facility] ArlingtonJreffgxXCYPNLHLFP9610-60-22 07:07:00





             Test Item    Value        Reference Range Interpretation Comments

 

             Segs-Bands # (test code = Segs-Bands #) 4.0          1.5-8.1       

            



[facility] ArlingtonRvqfybmLGCXKQJEAD0060-29-63 07:07:00





             Test Item    Value        Reference Range Interpretation Comments

 

             Monocytes (test code = Monocytes) 10.4         2.0-12.0            

      



[facility] ArlingtonXljmvugAEJUZTBZAN6448-83-37 07:07:00





             Test Item    Value        Reference Range Interpretation Comments

 

             RBC Morph (test code = Normal (18 2:07 AM)                     

      



             RBC Morph)                                          



[facility] ArlingtonPgqxgovGHXDEOJOLZ2210-18-73 07:07:00





             Test Item    Value        Reference Range Interpretation Comments

 

             Segs (test code = Segs) 58.1         45.0-75.0                 



[facility] ArlingtonBstnncwSRXRLTLAJF9973-89-82 07:07:00





             Test Item    Value        Reference Range Interpretation Comments

 

             Plt Morph (test code = Normal (18 2:07 AM)                     

      



             Plt Morph)                                          



[facility] ArlingtonFbzstmsCLZMXKQTLC5673-24-27 07:07:00





             Test Item    Value        Reference Range Interpretation Comments

 

             Lymphocytes (test code = Lymphocytes) 28.5         20.0-40.0       

          



[facility] ArlingtonXowgyhxUXYYWMDIJG5696-90-24 16:07:00





             Test Item    Value        Reference Range Interpretation Comments

 

             Basophils # (test code 0.1          See_Comment                [Aut

omated message] The



             = Basophils #)                                        system which 

generated



                                                                 this result tra

nsmitted



                                                                 reference range

: <=0.2.



                                                                 The reference r

zhen was



                                                                 not used to int

erpret



                                                                 this result as



                                                                 normal/abnormal

.



[facility] ArlingtonSzgolekJYQJSRDHRS2440-96-71 16:07:00





             Test Item    Value        Reference Range Interpretation Comments

 

             Polychrom (test code = Moderate *ABN*(18                       

    



             Polychrom)   11:07 AM)                              



[facility] ArlingtonXmpfbncOHVPSWQCNT6774-17-16 16:07:00





             Test Item    Value        Reference Range Interpretation Comments

 

             Basophils # (test code 0.1          See_Comment                [Aut

omated message] The



             = Basophils #)                                        system which 

generated



                                                                 this result tra

nsmitted



                                                                 reference range

: <=0.2.



                                                                 The reference r

zhen was



                                                                 not used to int

erpret



                                                                 this result as



                                                                 normal/abnormal

.



[facility] ArlingtonYmgqwjzNPJGGTSRTS1407-11-28 16:07:00





             Test Item    Value        Reference Range Interpretation Comments

 

             Polychrom (test code = Moderate *ABN*(18                       

    



             Polychrom)   11:07 AM)                              



[facility]CgigdgcUDEDTLTEDG3306[facility]52-64 [facility]:43:00





             Test Item    Value        Reference Range Interpretation Comments

 

             Vanco Tr TND (test code = Vanco Tr TND) *                          

            



[facility] MucjbadKPXOROIPTE0152-89-07 06:43:00





             Test Item    Value        Reference Range Interpretation Comments

 

             Vanco Tr (test code = Vanco Tr) 22.3                               

    



[facility] ZefwghwCCNLGLWLBM7524-94-58 06:43:00





             Test Item    Value        Reference Range Interpretation Comments

 

             Vanco Tr TND (test code = Vanco Tr TND) *                          

            



[facility] NxlfpswENWZEMLPBX8268-01-86 06:43:00





             Test Item    Value        Reference Range Interpretation Comments

 

             Vanco Tr (test code = Vanco Tr) 22.3                               

    



[facility]2018-05-04 11:45:00





             Test Item    Value        Reference Range Interpretation Comments

 

             Magnesium Lvl (test code = Magnesium 2.3          1.8-2.4          

         



             Lvl)                                                



[facility] NYQRS4789[facility]45[facility]00





             Test Item    Value        Reference Range Interpretation Comments

 

             Phosphorus (test code = Phosphorus) 3.5          2.5-4.5           

        



[facility] ArlingtonYvvfbdhOSHLXILQIZ8170-83-33 11:45:00





             Test Item    Value        Reference Range Interpretation Comments

 

             PT (test code = PT) 14.0 s       12.0-14.7                 



[facility] ArlingtonXsafozlFZFMVJIVWL1987-86-62 11:45:00





             Test Item    Value        Reference Range Interpretation Comments

 

             PTT (test code = PTT) 37.6 s       22.9-35.8                 



[facility] ArlingtonOyaiuulTCIRAHFFKA5015-22-56 11:45:00





             Test Item    Value        Reference Range Interpretation Comments

 

             INR (test code = INR) 1.08 1       0.85-1.17                 



[place] [address]-05-04 11:45:00





             Test Item    Value        Reference Range Interpretation Comments

 

             C-REACTIVE PROTEIN (test code = 13.1                               

    



             C-REACTIVE PROTEIN)                                        



[facility]MybtxalKFMJIWYHCH9316[facility]-47 11:45:00





             Test Item    Value        Reference Range Interpretation Comments

 

             Prealbumin (test code = Prealbumin) 25.2         18.0-45.0         

        



[facility]2018-05-04 11:45:00





             Test Item    Value        Reference Range Interpretation Comments

 

             Magnesium Lvl (test code = Magnesium 2.3          1.8-2.4          

         



             Lvl)                                                



[facility]2018-05-04 11:45:00





             Test Item    Value        Reference Range Interpretation Comments

 

             Phosphorus (test code = Phosphorus) 3.5          2.5-4.5           

        



[facility] ArlingtonUtjehdfFFDRIVPZUX8348-89-32 11:45:00





             Test Item    Value        Reference Range Interpretation Comments

 

             PT (test code = PT) 14.0 s       12.0-14.7                 



[facility] ArlingtonWyjyxshJTRIJOFSGG8679-99-27 11:45:00





             Test Item    Value        Reference Range Interpretation Comments

 

             PTT (test code = PTT) 37.6 s       22.9-35.8                 



[place] 889098-05-04 11:45:00





             Test Item    Value        Reference Range Interpretation Comments

 

             INR (test code = INR) 1.08 1       0.85-1.17                 



[place] 087778-05-04 11:45:00





             Test Item    Value        Reference Range Interpretation Comments

 

             C-REACTIVE PROTEIN (test code = 13.1                               

    



             C-REACTIVE PROTEIN)                                        



[facility]roeSnmbxsoTTJTUANKRI1760-90-07 11:45:00





             Test Item    Value        Reference Range Interpretation Comments

 

             Prealbumin (test code = Prealbumin) 25.2         18.0-45.0         

        



[facility]2018-05-04 03:06:00





             Test Item    Value        Reference Range Interpretation Comments

 

             Lactic Acid Lvl (test code = Lactic 0.9          0.5-2.2           

        



             Acid Lvl)                                           



[facility] ArlingtonOqcanrkRWORFFEZWK2446-10-31 03:06:00





             Test Item    Value        Reference Range Interpretation Comments

 

             Sed Rate (test code = 5            See_Comment                [Auto

mated message] The



             Sed Rate)                                           system which ge

nerated this



                                                                 result transmit

huma



                                                                 reference range

: <=15. The



                                                                 reference range

 was not



                                                                 used to interpr

et this



                                                                 result as zayda

l/abnormal.



[place] [address]-05-04 03:06:00





             Test Item    Value        Reference Range Interpretation Comments

 

             C-REACTIVE PROTEIN (test code = 15.8                               

    



             C-REACTIVE PROTEIN)                                        



[facility]2018-05-04 03:06:00





             Test Item    Value        Reference Range Interpretation Comments

 

             Lactic Acid Lvl (test code = Lactic 0.9          0.5-2.2           

        



             Acid Lvl)                                           



[facility]66[facility]:00





             Test Item    Value        Reference Range Interpretation Comments

 

             Sed Rate (test code = 5            See_Comment                [Auto

mated message] The



             Sed Rate)                                           system which ge

nerated this



                                                                 result transmit

huma



                                                                 reference range

: <=15. The



                                                                 reference range

 was not



                                                                 used to interpr

et this



                                                                 result as zayda

l/abnormal.



[place] [address]359-05-04 03:06:00





             Test Item    Value        Reference Range Interpretation Comments

 

             C-REACTIVE PROTEIN (test code = 15.8                               

    



             C-REACTIVE PROTEIN)                                        



[facility]ajhliETGYTRABJZ2163-92-03 00:44:00





             Test Item    Value        Reference Range Interpretation Comments

 

             PT (test code = PT) 13.0 s       12.0-14.7                 



[facility]hujrnqAGERLKUSIX0491-96-71 00:44:00





             Test Item    Value        Reference Range Interpretation Comments

 

             INR (test code = INR) 0.98 1       0.85-1.17                 



[facility] Arlington[facility]qbwtpbQBPWJUNHFS3608-72-47 00:44:00





             Test Item    Value        Reference Range Interpretation Comments

 

             PTT (test code = PTT) 33.2 s       22.9-35.8                 



[facility] Arlington[facility]ospwyfYGQRBFTOIC2456-91-18 00:44:00





             Test Item    Value        Reference Range Interpretation Comments

 

             PT (test code = PT) 13.0 s       12.0-14.7                 



[facility] ArlingtonAjurraqDUDYDQRIYT5686-28-21 00:44:00





             Test Item    Value        Reference Range Interpretation Comments

 

             INR (test code = INR) 0.98 1       0.85-1.17                 



[facility]zienjjCGHJDTOFYY1997-27-73 00:44:00





             Test Item    Value        Reference Range Interpretation Comments

 

             PTT (test code = PTT) 33.2 s       22.9-35.8                 



[facility] [id number] 23:51:00





             Test Item    Value        Reference Range Interpretation Comments

 

             Antibody Scrn (test Negative (5/3/18 6:51                          

 



             code = Antibody Scrn) PM)                                    



[facility]  23:51:00





             Test Item    Value        Reference Range Interpretation Comments

 

             ABO/Rh (test code = ABO/Rh) AB POS                                 



[facility] [id number] 23:51:00





             Test Item    Value        Reference Range Interpretation Comments

 

             Antibody Scrn (test Negative (5/3/18 6:51                          

 



             code = Antibody Scrn) PM)                                    



[facility] [id number] 23:51:00





             Test Item    Value        Reference Range Interpretation Comments

 

             ABO/Rh (test code = ABO/Rh) AB POS                                 



[facility] [id number] 23:35:00





             Test Item    Value        Reference Range Interpretation Comments

 

             UA Urobilinogen (test code = UA 0.2          0.1-1.0               

    



             Urobilinogen)                                        



[facility] [id number] 23:35:00





             Test Item    Value        Reference Range Interpretation Comments

 

             UA Nitrite (test code Negative (5/3/18 6:35                        

   



             = UA Nitrite) PM)                                    



[facility] [id number] 23:35:00





             Test Item    Value        Reference Range Interpretation Comments

 

             UA Glucose (test code Negative (5/3/18 6:35                        

   



             = UA Glucose) PM)                                    



[facility] [id number] 23:35:00





             Test Item    Value        Reference Range Interpretation Comments

 

             UA Ketones (test code Negative *NA*(5/3/18                         

  



             = UA Ketones) 6:35 PM)                               



[facility] [id number] 23:35:00





             Test Item    Value        Reference Range Interpretation Comments

 

             UA Bili (test code = Negative *NA*(5/3/18                          

 



             UA Bili)     6:35 PM)                               



[facility] [id number] 23:35:00





             Test Item    Value        Reference Range Interpretation Comments

 

             UA Blood (test code = Trace *ABN*(5/3/18                           



             UA Blood)    6:35 PM)                               



[facility] [id number] 23:35:00





             Test Item    Value        Reference Range Interpretation Comments

 

             UA Leuk Est (test code Small *ABN*(5/3/18 6:35                     

      



             = UA Leuk Est) PM)                                    



[facility] [id number] 23:35:00





             Test Item    Value        Reference Range Interpretation Comments

 

             UA Spec Grav (test code = UA Spec 1.020 1                          

      



             Grav)                                               



[facility] [id number] 23:35:00





             Test Item    Value        Reference Range Interpretation Comments

 

             UA pH (test code = UA pH) 6.0 1        5.0-8.0                   



[facility] [id number] 23:35:00





             Test Item    Value        Reference Range Interpretation Comments

 

             UA Color (test code = Yellow *NA*(5/3/18 6:35                      

     



             UA Color)    PM)                                    



[facility] [id number] 23:35:00





             Test Item    Value        Reference Range Interpretation Comments

 

             UA Protein (test code = Trace *ABN*(5/3/18                         

  



             UA Protein)  6:35 PM)                               



[facility] [id number] 23:35:00





             Test Item    Value        Reference Range Interpretation Comments

 

             UA Turbidity (test code Slight Cloudy (5/3/18                      

     



             = UA Turbidity) 6:35 PM)                               



[facility] [id number] 23:35:00





             Test Item    Value        Reference Range Interpretation Comments

 

             UA Hyal Cast 0-2 (5/3/18 6:35 See_Comment                [Automated

 message]



             (test code = UA PM)                                    The system w

[facility]



             Hyal Cast)                                          generated this 

result



                                                                 transmitted ref

erence



                                                                 range: <=2. The



                                                                 reference range

 was



                                                                 not used to int

erpret



                                                                 this result as



                                                                 normal/abnormal

.



[facility] [id number] 23:35:00





             Test Item    Value        Reference Range Interpretation Comments

 

             UA Bacteria (test code = UA Occasional /HPF                        

   



             Bacteria)                                           



[facility] [id number] 23:35:00





             Test Item    Value        Reference Range Interpretation Comments

 

             UA RBC (test code 11-20 /HPF   See_Comment                [Automate

d message] The



             = UA RBC)                                           system which ge

nerated



                                                                 this result tra

nsmitted



                                                                 reference range

: <=2. The



                                                                 reference range

 was not



                                                                 used to interpr

et this



                                                                 result as



                                                                 normal/abnormal

.



[facility] [id number] 23:35:00





             Test Item    Value        Reference Range Interpretation Comments

 

             UA Sq Epi (test code = UA Sq Occasional /LPF                       

    



             Epi)                                                



[facility] [id number] 23:35:00





             Test Item    Value        Reference Range Interpretation Comments

 

             UA WBC (test code = UA WBC)  /HPF                            



[facility] [id number] 23:35:00





             Test Item    Value        Reference Range Interpretation Comments

 

             UA Urobilinogen (test code = UA 0.2          0.1-1.0               

    



             Urobilinogen)                                        



[facility] [id number] 23:35:00





             Test Item    Value        Reference Range Interpretation Comments

 

             UA Nitrite (test code Negative (5/3/18 6:35                        

   



             = UA Nitrite) PM)                                    



[facility] [id number] 23:35:00





             Test Item    Value        Reference Range Interpretation Comments

 

             UA Glucose (test code Negative (5/3/18 6:35                        

   



             = UA Glucose) PM)                                    



[facility] [id number] 23:35:00





             Test Item    Value        Reference Range Interpretation Comments

 

             UA Ketones (test code Negative *NA*(5/3/18                         

  



             = UA Ketones) 6:35 PM)                               



[facility] [id number] 23:35:00





             Test Item    Value        Reference Range Interpretation Comments

 

             UA Bili (test code = Negative *NA*(5/3/18                          

 



             UA Bili)     6:35 PM)                               



[facility] [id number] 23:35:00





             Test Item    Value        Reference Range Interpretation Comments

 

             UA Blood (test code = Trace *ABN*(5/3/18                           



             UA Blood)    6:35 PM)                               



[facility] [id number] 23:35:00





             Test Item    Value        Reference Range Interpretation Comments

 

             UA Leuk Est (test code Small *ABN*(5/3/18 6:35                     

      



             = UA Leuk Est) PM)                                    



[facility] [id number] 23:35:00





             Test Item    Value        Reference Range Interpretation Comments

 

             UA Spec Grav (test code = UA Spec 1.020 1                          

      



             Grav)                                               



[facility] [id number] 23:35:00





             Test Item    Value        Reference Range Interpretation Comments

 

             UA pH (test code = UA pH) 6.0 1        5.0-8.0                   



[facility] [id number] 23:35:00





             Test Item    Value        Reference Range Interpretation Comments

 

             UA Color (test code = Yellow *NA*(5/3/18 6:35                      

     



             UA Color)    PM)                                    



[facility] [id number] 23:35:00





             Test Item    Value        Reference Range Interpretation Comments

 

             UA Protein (test code = Trace *ABN*(5/3/18                         

  



             UA Protein)  6:35 PM)                               



[facility] [id number] 23:35:00





             Test Item    Value        Reference Range Interpretation Comments

 

             UA Turbidity (test code Slight Cloudy (5/3/18                      

     



             = UA Turbidity) 6:35 PM)                               



[facility] [id number] 23:35:00





             Test Item    Value        Reference Range Interpretation Comments

 

             UA Hyal Cast 0-2 (5/3/18 6:35 See_Comment                [Automated

 message]



             (test code = UA PM)                                    The system w

[facility]



             Hyal Cast)                                          generated this 

result



                                                                 transmitted ref

erence



                                                                 range: <=2. The



                                                                 reference range

 was



                                                                 not used to int

erpret



                                                                 this result as



                                                                 normal/abnormal

.



[facility] [id number] 23:35:00





             Test Item    Value        Reference Range Interpretation Comments

 

             UA Bacteria (test code = UA Occasional /HPF                        

   



             Bacteria)                                           



[facility] [id number] 23:35:00





             Test Item    Value        Reference Range Interpretation Comments

 

             UA RBC (test code 11-20 /HPF   See_Comment                [Automate

d message] The



             = UA RBC)                                           system which ge

nerated



                                                                 this result tra

nsmitted



                                                                 reference range

: <=2. The



                                                                 reference range

 was not



                                                                 used to interpr

et this



                                                                 result as



                                                                 normal/abnormal

.



[facility] [id number] 23:35:00





             Test Item    Value        Reference Range Interpretation Comments

 

             UA Sq Epi (test code = UA Sq Occasional /LPF                       

    



             Epi)                                                



[facility] [id number] 23:35:00





             Test Item    Value        Reference Range Interpretation Comments

 

             UA WBC (test code = UA WBC)  /HPF                            



[id number]





             Test Item    Value        Reference Range Interpretation Comments

 

             Basophils # (test code 0.1          See_Comment                [Aut

omated message] The



             = Basophils #)                                        system which 

generated



                                                                 this result tra

nsmitted



                                                                 reference range

: <=0.2.



                                                                 The reference r

zhen was



                                                                 not used to int

erpret



                                                                 this result as



                                                                 normal/abnormal

.



[facility]zngMKFHTIYEKT9320-45-83 23:20:00





             Test Item    Value        Reference Range Interpretation Comments

 

             Polychrom (test code = Polychrom) Slight                           

      



[facility] Arlington[facility]pcwqbbANSNEHZNLC0621-16-99 23:20:00





             Test Item    Value        Reference Range Interpretation Comments

 

             Plt Morph (test code = Normal (5/3/18 6:20 PM)                     

      



             Plt Morph)                                          



[facility] Arlington[facility]lzurheDIINWNJSPO6063-56-22 23:20:00





             Test Item    Value        Reference Range Interpretation Comments

 

             Basophils # (test code 0.1          See_Comment                [Aut

omated message] The



             = Basophils #)                                        system which 

generated



                                                                 this result tra

nsmitted



                                                                 reference range

: <=0.2.



                                                                 The reference r

zhen was



                                                                 not used to int

erpret



                                                                 this result as



                                                                 normal/abnormal

.



[facility] ArlingtonKywbqpfCWBVZOEAVR2058-19-04 23:20:00





             Test Item    Value        Reference Range Interpretation Comments

 

             Polychrom (test code = Polychrom) Slight                           

      



[facility] ArlingtonHpsivplQKSXXZDENJ8228-39-44 23:20:00





             Test Item    Value        Reference Range Interpretation Comments

 

             Plt Morph (test code = Normal (5/3/18 6:20 PM)                     

      



             Plt Morph)                                          



[facility] [id number] 16:22:00





             Test Item    Value        Reference Range Interpretation Comments

 

             CULTURE (BEAKER) (test No growth in 5 days                         

  



             code = 1095)                                        



BLOOD [id number] 16:22:00





             Test Item    Value        Reference Range Interpretation Comments

 

             CULTURE (BEAKER) (test No growth in 5 days                         

  



             code = 1095)                                        



(MANUAL DIFFERENTIAL)2017 22:29:00





             Test Item    Value        Reference Range Interpretation Comments

 

             TOTAL COUNTED (BEAKER) (test code =                                

        



             1351)                                               

 

             WBC MORPHOLOGY (BEAKER) (test code = Normal                        

         



             487)                                                

 

             PLT MORPHOLOGY (BEAKER) (test code = Normal                        

         



             486)                                                

 

             RBC MORPHOLOGY (BEAKER) (test code = Normal                        

         



             762)                                                



CBC W/PLT COUNT &amp; AUTO [id number] 22:28:00





             Test Item    Value        Reference Range Interpretation Comments

 

             WHITE BLOOD CELL COUNT (BEAKER) 7.3 K/ L     4.0-10.0              

    



             (test code = 775)                                        

 

             RED BLOOD CELL COUNT (BEAKER) 5.09 M/ L    4.20-5.80               

  



             (test code = 761)                                        

 

             HEMOGLOBIN (BEAKER) (test code = 13.0 GM/DL   13.0-16.8            

     



             410)                                                

 

             HEMATOCRIT (BEAKER) (test code = 42.3 %       40.0-50.0            

     



             411)                                                

 

             MEAN CORPUSCULAR VOLUME (BEAKER) 83.0 fL      82.0-98.0            

     



             (test code = 753)                                        

 

             MEAN CORPUSCULAR HEMOGLOBIN 25.6 pg      27.0-33.0    L            



             (BEAKER) (test code = 751)                                        

 

             MEAN CORPUSCULAR HEMOGLOBIN CONC 30.8 GM/DL   32.0-36.0    L       

     



             (BEAKER) (test code = 752)                                        

 

             RED CELL DISTRIBUTION WIDTH 18.0 %       10.3-14.2    H            



             (BEAKER) (test code = 412)                                        

 

             PLATELET COUNT (BEAKER) (test 331 K/CU MM  150-430                 

  



             code = 756)                                         

 

             MEAN PLATELET VOLUME (BEAKER) 8.5 fL       6.5-10.5                

  



             (test code = 754)                                        

 

             NUCLEATED RED BLOOD CELLS 0 /100 WBC   0-0                       



             (BEAKER) (test code = 413)                                        

 

             NEUTROPHILS RELATIVE PERCENT 65 %                                  

 



             (BEAKER) (test code = 429)                                        

 

             LYMPHOCYTES RELATIVE PERCENT 23 %                                  

 



             (BEAKER) (test code = 430)                                        

 

             MONOCYTES RELATIVE PERCENT 8 %                                    



             (BEAKER) (test code = 431)                                        

 

             EOSINOPHILS RELATIVE PERCENT 3 %                                   

 



             (BEAKER) (test code = 432)                                        

 

             BASOPHILS RELATIVE PERCENT 1 %                                    



             (BEAKER) (test code = 437)                                        

 

             NEUTROPHILS ABSOLUTE COUNT 4.75 K/ L    1.80-8.00                 



             (BEAKER) (test code = 670)                                        

 

             LYMPHOCYTES ABSOLUTE COUNT 1.68 K/ L    1.48-4.50                 



             (BEAKER) (test code = 414)                                        

 

             MONOCYTES ABSOLUTE COUNT (BEAKER) 0.55 K/ L    0.00-1.30           

      



             (test code = 415)                                        

 

             EOSINOPHILS ABSOLUTE COUNT 0.24 K/ L    0.00-0.50                 



             (BEAKER) (test code = 416)                                        

 

             BASOPHILS ABSOLUTE COUNT (BEAKER) 0.05 K/ L    0.00-0.20           

      



             (test code = 417)                                        



[id number] METABOLIC [id number] 11:11:00





             Test Item    Value        Reference Range Interpretation Comments

 

             SODIUM (BEAKER) 138 meq/L    136-145                   



             (test code = 381)                                        

 

             POTASSIUM (BEAKER) 4.0 meq/L    3.5-5.1                   



             (test code = 379)                                        

 

             CHLORIDE (BEAKER) 108 meq/L           H            



             (test code = 382)                                        

 

             CO2 (BEAKER) (test 23 meq/L     22-29                     



             code = 355)                                         

 

             BLOOD UREA NITROGEN 11 mg/dL     7-21                      



             (BEAKER) (test code                                        



             = 354)                                              

 

             CREATININE (BEAKER) 0.74 mg/dL   0.57-1.25                 



             (test code = 358)                                        

 

             GLUCOSE RANDOM 124 mg/dL           H            



             (BEAKER) (test code                                        



             = 652)                                              

 

             CALCIUM (BEAKER) 8.9 mg/dL    8.4-10.2                  



             (test code = 697)                                        

 

             EGFR (BEAKER) (test 150 mL/min/1.73                           ESTIM

ATED GFR IS



             code = 1092) sq m                                   NOT AS ACCURATE

 AS



                                                                 CREATININE



                                                                 CLEARANCE IN



                                                                 PREDICTING



                                                                 GLOMERULAR



                                                                 FILTRATION RATE

.



                                                                 ESTIMATED GFR I

S



                                                                 NOT APPLICABLE 

FOR



                                                                 DIALYSIS PATIEN

TS.



URINE [id number] 09:56:00





             Test Item    Value        Reference Range Interpretation Comments

 

             CULTURE (BEAKER) (test <10,000 col/mL skin                         

  



             code = 1095) jaime                                  



COMPREHENSIVE METABOLIC [id number] 08:49:00





             Test Item    Value        Reference Range Interpretation Comments

 

             TOTAL PROTEIN 7.3 gm/dL    6.0-8.3                   



             (BEAKER) (test code =                                        



             770)                                                

 

             ALBUMIN (BEAKER) 3.3 g/dL     3.5-5.0      L            



             (test code = 1145)                                        

 

             ALKALINE PHOSPHATASE 89 U/L                           



             (BEAKER) (test code =                                        



             346)                                                

 

             BILIRUBIN TOTAL 1.4 mg/dL    0.2-1.2      H            



             (BEAKER) (test code =                                        



             377)                                                

 

             SODIUM (BEAKER) (test 137 meq/L    136-145                   



             code = 381)                                         

 

             POTASSIUM (BEAKER) 3.7 meq/L    3.5-5.1                   



             (test code = 379)                                        

 

             CHLORIDE (BEAKER) 108 meq/L           H            



             (test code = 382)                                        

 

             CO2 (BEAKER) (test 17 meq/L     22-29        L            



             code = 355)                                         

 

             BLOOD UREA NITROGEN 12 mg/dL     7-21                      



             (BEAKER) (test code =                                        



             354)                                                

 

             CREATININE (BEAKER) 0.78 mg/dL   0.57-1.25                 



             (test code = 358)                                        

 

             GLUCOSE RANDOM 119 mg/dL           H            



             (BEAKER) (test code =                                        



             652)                                                

 

             CALCIUM (BEAKER) 8.6 mg/dL    8.4-10.2                  



             (test code = 697)                                        

 

             AST (SGOT) (BEAKER) 13 U/L       5-34                      



             (test code = 353)                                        

 

             ALT (SGPT) (BEAKER) 28 U/L       6-55                      



             (test code = 347)                                        

 

             EGFR (BEAKER) (test 141                                    ESTIMATE

D GFR IS



             code = 1092) mL/min/1.73 sq                           NOT AS ACCURA

TE AS



                          m                                      CREATININE



                                                                 CLEARANCE IN



                                                                 PREDICTING



                                                                 GLOMERULAR



                                                                 FILTRATION RATE

.



                                                                 ESTIMATED GFR I

S



                                                                 NOT APPLICABLE 

FOR



                                                                 DIALYSIS PATIEN

TS.



CBC W/PLT COUNT &amp; AUTO [id number] 08:46:00





             Test Item    Value        Reference Range Interpretation Comments

 

             WHITE BLOOD CELL COUNT (BEAKER) 9.4 K/ L     4.0-10.0              

    



             (test code = 775)                                        

 

             RED BLOOD CELL COUNT (BEAKER) 4.79 M/ L    4.20-5.80               

  



             (test code = 761)                                        

 

             HEMOGLOBIN (BEAKER) (test code = 12.8 GM/DL   13.0-16.8    L       

     



             410)                                                

 

             HEMATOCRIT (BEAKER) (test code = 40.0 %       40.0-50.0            

     



             411)                                                

 

             MEAN CORPUSCULAR VOLUME (BEAKER) 83.4 fL      82.0-98.0            

     



             (test code = 753)                                        

 

             MEAN CORPUSCULAR HEMOGLOBIN 26.7 pg      27.0-33.0    L            



             (BEAKER) (test code = 751)                                        

 

             MEAN CORPUSCULAR HEMOGLOBIN CONC 32.0 GM/DL   32.0-36.0            

     



             (BEAKER) (test code = 752)                                        

 

             RED CELL DISTRIBUTION WIDTH 18.2 %       10.3-14.2    H            



             (BEAKER) (test code = 412)                                        

 

             PLATELET COUNT (BEAKER) (test 313 K/CU MM  150-430                 

  



             code = 756)                                         

 

             MEAN PLATELET VOLUME (BEAKER) 8.6 fL       6.5-10.5                

  



             (test code = 754)                                        

 

             NUCLEATED RED BLOOD CELLS 0 /100 WBC   0-0                       



             (BEAKER) (test code = 413)                                        

 

             NEUTROPHILS RELATIVE PERCENT 70 %                                  

 



             (BEAKER) (test code = 429)                                        

 

             LYMPHOCYTES RELATIVE PERCENT 16 %                                  

 



             (BEAKER) (test code = 430)                                        

 

             MONOCYTES RELATIVE PERCENT 12 %                                   



             (BEAKER) (test code = 431)                                        

 

             EOSINOPHILS RELATIVE PERCENT 1 %                                   

 



             (BEAKER) (test code = 432)                                        

 

             BASOPHILS RELATIVE PERCENT 1 %                                    



             (BEAKER) (test code = 437)                                        

 

             NEUTROPHILS ABSOLUTE COUNT 6.54 K/ L    1.80-8.00                 



             (BEAKER) (test code = 670)                                        

 

             LYMPHOCYTES ABSOLUTE COUNT 1.50 K/ L    1.48-4.50                 



             (BEAKER) (test code = 414)                                        

 

             MONOCYTES ABSOLUTE COUNT (BEAKER) 1.13 K/ L    0.00-1.30           

      



             (test code = 415)                                        

 

             EOSINOPHILS ABSOLUTE COUNT 0.13 K/ L    0.00-0.50                 



             (BEAKER) (test code = 416)                                        

 

             BASOPHILS ABSOLUTE COUNT (BEAKER) 0.06 K/ L    0.00-0.20           

      



             (test code = 417)                                        



0.00URINALYSIS W/ [id number] 20:36:00





             Test Item    Value        Reference Range Interpretation Comments

 

             COLOR (BEAKER) (test code = 470) Yellow                            

     

 

             CLARITY (BEAKER) (test code = 469) Hazy                            

       

 

             SPECIFIC GRAVITY UA (BEAKER) (test 1.012        1.001-1.035        

       



             code = 468)                                         

 

             PH UA (BEAKER) (test code = 467) 5.5          5.0-8.0              

     

 

             PROTEIN UA (BEAKER) (test code = 100 mg/dL    Negative     A       

     



             464)                                                

 

             GLUCOSE UA (BEAKER) (test code = Negative     Negative             

     



             365)                                                

 

             KETONES UA (BEAKER) (test code = Negative     Negative             

     



             371)                                                

 

             BILIRUBIN UA (BEAKER) (test code = Negative     Negative           

       



             462)                                                

 

             BLOOD UA (BEAKER) (test code = 461) Moderate     Negative     A    

        

 

             NITRITE UA (BEAKER) (test code = Negative     Negative             

     



             465)                                                

 

             LEUKOCYTE ESTERASE UA (BEAKER) Large        Negative     A         

   



             (test code = 466)                                        

 

             UROBILINOGEN UA (BEAKER) (test code 3.0 mg/dL    0.2-1.0      H    

        



             = 463)                                              

 

             RBC UA (BEAKER) (test code = 519) 19 /HPF                          

      

 

             WBC UA (BEAKER) (test code = 520) 182 /HPF                         

      

 

             SOURCE(BEAKER) (test code = 6946)                                  

      



BASIC METABOLIC [id number] 17:00:00





             Test Item    Value        Reference Range Interpretation Comments

 

             SODIUM (BEAKER) 140 meq/L    136-145                   



             (test code = 381)                                        

 

             POTASSIUM (BEAKER) 3.7 meq/L    3.5-5.1                   



             (test code = 379)                                        

 

             CHLORIDE (BEAKER) 112 meq/L           H            



             (test code = 382)                                        

 

             CO2 (BEAKER) (test 17 meq/L     22-29        L            



             code = 355)                                         

 

             BLOOD UREA NITROGEN 23 mg/dL     7-21         H            



             (BEAKER) (test code                                        



             = 354)                                              

 

             CREATININE (BEAKER) 1.00 mg/dL   0.57-1.25                 



             (test code = 358)                                        

 

             GLUCOSE RANDOM 102 mg/dL                        



             (BEAKER) (test code                                        



             = 652)                                              

 

             CALCIUM (BEAKER) 8.7 mg/dL    8.4-10.2                  



             (test code = 697)                                        

 

             EGFR (BEAKER) (test 106 mL/min/1.73                           ESTIM

ATED GFR IS



             code = 1092) sq m                                   NOT AS ACCURATE

 AS



                                                                 CREATININE



                                                                 CLEARANCE IN



                                                                 PREDICTING



                                                                 GLOMERULAR



                                                                 FILTRATION RATE

.



                                                                 ESTIMATED GFR I

S



                                                                 NOT APPLICABLE 

FOR



                                                                 DIALYSIS PATIEN

TS.



Specimen slightly ictericCBC W/PLT COUNT &amp; AUTO [id number] 
12:18:00





             Test Item    Value        Reference Range Interpretation Comments

 

             WHITE BLOOD CELL COUNT (BEAKER) 16.4 K/ L    4.0-10.0     H        

    



             (test code = 775)                                        

 

             RED BLOOD CELL COUNT (BEAKER) 5.22 M/ L    4.20-5.80               

  



             (test code = 761)                                        

 

             HEMOGLOBIN (BEAKER) (test code = 14.4 GM/DL   13.0-16.8            

     



             410)                                                

 

             HEMATOCRIT (BEAKER) (test code = 42.9 %       40.0-50.0            

     



             411)                                                

 

             MEAN CORPUSCULAR VOLUME (BEAKER) 82.2 fL      82.0-98.0            

     



             (test code = 753)                                        

 

             MEAN CORPUSCULAR HEMOGLOBIN 27.5 pg      27.0-33.0                 



             (BEAKER) (test code = 751)                                        

 

             MEAN CORPUSCULAR HEMOGLOBIN CONC 33.5 GM/DL   32.0-36.0            

     



             (BEAKER) (test code = 752)                                        

 

             RED CELL DISTRIBUTION WIDTH 16.2 %       10.3-14.2    H            



             (BEAKER) (test code = 412)                                        

 

             PLATELET COUNT (BEAKER) (test 329 K/CU MM  150-430                 

  



             code = 756)                                         

 

             MEAN PLATELET VOLUME (BEAKER) 8.2 fL       6.5-10.5                

  



             (test code = 754)                                        

 

             NUCLEATED RED BLOOD CELLS 0 /100 WBC   0-0                       



             (BEAKER) (test code = 413)                                        

 

             NEUTROPHILS RELATIVE PERCENT 83 %                                  

 



             (BEAKER) (test code = 429)                                        

 

             LYMPHOCYTES RELATIVE PERCENT 7 %                                   

 



             (BEAKER) (test code = 430)                                        

 

             MONOCYTES RELATIVE PERCENT 9 %                                    



             (BEAKER) (test code = 431)                                        

 

             EOSINOPHILS RELATIVE PERCENT 0 %                                   

 



             (BEAKER) (test code = 432)                                        

 

             BASOPHILS RELATIVE PERCENT 0 %                                    



             (BEAKER) (test code = 437)                                        

 

             NEUTROPHILS ABSOLUTE COUNT 1.62 K/ L    1.80-8.00    L            



             (BEAKER) (test code = 670)                                        

 

             LYMPHOCYTES ABSOLUTE COUNT 1.15 K/ L    1.48-4.50    L            



             (BEAKER) (test code = 414)                                        

 

             MONOCYTES ABSOLUTE COUNT (BEAKER) 1.56 K/ L    0.00-1.30    H      

      



             (test code = 415)                                        

 

             EOSINOPHILS ABSOLUTE COUNT 0.01 K/ L    0.00-0.50                 



             (BEAKER) (test code = 416)                                        

 

             BASOPHILS ABSOLUTE COUNT (BEAKER) 0.02 K/ L    0.00-0.20           

      



             (test code = 417)                                        



(MANUAL DIFFERENTIAL)2017 12:18:00





             Test Item    Value        Reference Range Interpretation Comments

 

             TOTAL COUNTED (BEAKER) (test code =                                

        



             1351)                                               

 

             WBC MORPHOLOGY (BEAKER) (test code = Normal                        

         



             487)                                                

 

             PLT MORPHOLOGY (BEAKER) (test code = Normal                        

         



             486)                                                

 

             RBC MORPHOLOGY (BEAKER) (test code = Normal                        

         



             762)

## 2022-11-25 NOTE — RAD REPORT
EXAM DESCRIPTION:  CT - Abdomen   Pelvis Wo Contrast - 11/25/2022 7:02 pm

 

CLINICAL HISTORY:  Abdominal  pain

 

COMPARISON:  March 2022

 

TECHNIQUE:  Computed axial tomography of the abdomen and pelvis was obtained. IV and oral contrast we
re not requested.

 

All CT scans are performed using dose optimization technique as appropriate and may include automated
 exposure control or mA/KV adjustment according to patient size.

 

FINDINGS:   The evaluation of solid organs, vessels and bowel is limited secondary to the lack of con
trast administration.

 

The liver, spleen, pancreas, adrenals and kidneys appear grossly normal.

 

The appendix is normal. There is no evidence of diverticulitis.

 

Dialysis catheter with its tip in the anterior right lower abdomen.

 

Spina bifida. Small umbilical hernia. Mild inguinal lymphadenopathy probably reactive

 

Suprapubic catheter in place with chronic bladder wall thickening

 

IMPRESSION:   No acute abnormality is displayed.

## 2022-11-25 NOTE — ER
Nurse's Notes                                                                                     

 [facility]                                                                 

Name: Redd Dale Jr                                                                            

Age: 37 yrs                                                                                       

Sex: Male                                                                                         

: 1985                                                                                   

MRN: [id number]                                                                                   

Arrival Date: 2022                                                                          

Time: 14:18                                                                                       

Account#: [id number]                                                                            

Bed 20                                                                                            

Private MD:                                                                                       

Diagnosis: Acute cystitis-suprapubic catherter;Elevated white blood cell count;Cerebral palsy,    

  unspecified;Obesity, unspecified                                                                

                                                                                                  

Presentation:                                                                                     

                                                                                             

14:26 Chief complaint: EMS states: Upper abdominal pain and N/V x 3 days. Coronavirus screen: hb  

      At this time, the client does not indicate any symptoms associated with coronavirus-19.     

      Ebola Screen: No symptoms or risks identified at this time. Risk Assessment: Do you         

      want to hurt yourself or someone else? Patient reports no desire to harm self or others.    

14:26 Method Of Arrival: EMS: [facility]                                                    hb  

14:27 Initial Sepsis Screen: Does the patient meet any 2 criteria? No. Patient's initial      hb  

      sepsis screen is negative. Does the patient have a suspected source of infection? No.       

      Patient's initial sepsis screen is negative. Onset of symptoms was 2022.       

14:27 Acuity: AYESHA 3                                                                           hb  

                                                                                                  

Historical:                                                                                       

- Allergies:                                                                                      

14:27 Amoxicillin;                                                                            hb  

14:27 Bactrim;                                                                                hb  

14:27 Ciprofloxacin;                                                                          hb  

14:27 CLAVULANIC ACID;                                                                        hb  

14:27 Demerol;                                                                                hb  

14:27 Doxycycline;                                                                            hb  

14:27 Levofloxacin;                                                                           hb  

14:27 Morphine;                                                                               hb  

14:27 PENICILLINS;                                                                            hb  

14:27 Toradol;                                                                                hb  

14:27 TRIMETHOPRIM;                                                                           hb  

14:27 Vancomycin;                                                                             hb  

14:27 Zofran;                                                                                 hb  

- PMHx:                                                                                           

17:51 Asthma; Cerebral Palsy; cluster headaches; decubitus ulcers on feet; GERD;              ss  

      Hydrocephalus; Hypertension; spina bifida;                                                  

                                                                                                  

- Immunization history:: Adult Immunizations unknown.                                             

- Family history:: not pertinent.                                                                 

- Social history:: Smoking status: Patient denies any tobacco usage or history of.                

                                                                                                  

                                                                                                  

Screening:                                                                                        

15:55 Abuse screen: Denies threats or abuse. Denies injuries from another. Nutritional        ko1 

      screening: No deficits noted. Tuberculosis screening: No symptoms or risk factors           

      identified. Fall Risk None identified.                                                      

                                                                                                  

Assessment:                                                                                       

18:10 General: Appears in no apparent distress. uncomfortable, Behavior is calm, cooperative, ko1 

      appropriate for age. Pain: Complains of pain in back and left leg and left calf and         

      left upper quadrant and right upper quadrant and epigastric area. Neuro: No deficits        

      noted. Cardiovascular: No deficits noted. Respiratory: No deficits noted. GI: Reports       

      lower abdominal pain, nausea. : to gravity drainage Urine is cloudy. EENT: No             

      deficits noted. Derm: Wound noted right foot and left foot and left calf Reports pain.      

      Musculoskeletal: No deficits noted.                                                         

19:10 Reassessment: pt. transferred by stretcher. with tech and nurse. report given to georgia Kearney 

      RN. General: Appears uncomfortable, Behavior is calm, cooperative. Pain: Complains of       

      pain in left foot Pain does not radiate. Pain currently is 4 out of 10 on a pain scale.     

      at worst was 9 out of 10 on a pain scale. Alleviated by medications. Neuro: Level of        

      Consciousness is awake, alert, obeys commands, Oriented to person, place, time,             

      situation. Cardiovascular: Capillary refill < 3 seconds fingers Patient's skin is warm      

      and dry. Respiratory: Airway is patent Trachea midline Respiratory effort is even,          

      unlabored, Respiratory pattern is regular, symmetrical. GI: No signs and/or symptoms        

      were reported involving the gastrointestinal system. Abdomen is non-distended, obese.       

      : Urine is cloudy. Derm: Wound noted right foot and left foot. Musculoskeletal: Range     

      of motion: limited in lower extremities.                                                    

                                                                                                  

Vital Signs:                                                                                      

14:26  / 88; Pulse 68; Resp 16; Temp 97.4; Pulse Ox 100% on R/A; Pain 8/10;             hb  

15:55  / 109; Pulse 72; Resp 18; Pulse Ox 99% on R/A;                                   ko1 

17:00  / 106; Pulse 85;                                                                 ko1 

18:00  / 102; Pulse 78; Pulse Ox 98% ;                                                  ko1 

19:00  / 89; Pulse 91; Resp 20 S; Pulse Ox 98% on R/A;                                  ha1 

                                                                                                  

ED Course:                                                                                        

14:18 Patient arrived in ED.                                                                  jj6 

14:27 Triage completed.                                                                       hb  

15:47 Lupe Rainey, RN is Primary Nurse.                                                     ko1 

15:55 Patient has correct armband on for positive identification. Bed in low position. Call   ko1 

      light in reach. Side rails up X 1. Client placed on continuous cardiac and pulse            

      oximetry monitoring. NIBP monitoring applied. Cardiac monitor on.                           

15:59 Luis Cruz MD is Attending Physician.                                             arlin 

17:05 Urine Microscopic Only Sent.                                                            ko1 

17:52 Missed attempt(s): 22 gauge in left upper arm. Bleeding controlled, band aid applied,   ss  

      catheter tip intact.                                                                        

17:52 Missed attempt(s): 22 gauge in right forearm. Bleeding controlled, band aid applied,    ss  

      catheter tip intact.                                                                        

18:34 Inserted saline lock: 22 gauge in right forearm, using aseptic technique. ,using        ss  

      aseptic technique. Insertion VIA US guided Blood collected.                                 

18:47 Himanshu Mckeon MD is Hospitalizing Provider.                                            arlin 

19:04 CT Abd/Pelvis - Without Contrast In Process Unspecified.                                EDMS

19:35 Chest Single View XRAY In Process Unspecified.                                          EDMS

19:52 Domingo Mc MD is Hospitalizing Provider.                                              

20:00 Arm band placed on.                                                                     ha1 

21:00 No provider procedures requiring assistance completed. Patient admitted, IV remains in  ha1 

      place.                                                                                      

                                                                                             

05:26 SARS RAPID Sent.                                                                        ha1 

                                                                                                  

Administered Medications:                                                                         

                                                                                             

18:39 Drug: Pepcid (famotidine) 20 mg Route: IVP; Site: right forearm;                        ko1 

18:39 Drug: Dilaudid (HYDROmorphone) 1 mg Route: IVP; Site: right forearm;                    ko1 

19:10 Follow up: Response: No adverse reaction; Pain is decreased; RASS: Alert and Calm (0)   ha1 

18:39 Drug: Phenergan (promethazine) 25 mg Route: IVP; Site: right forearm;                   ko1 

18:39 Drug: NS 0.9% 500 ml Route: IV; Rate: bolus; Site: right forearm;                       ko1 

20:14 Drug: NS 0.9% 1000 ml Route: IV; Rate: 1 bolus; Site: right forearm;                    ha1 

                                                                                             

03:50 Follow up: Response: No adverse reaction; IV Status: Completed infusion; IV Intake:     ha1 

      1000ml                                                                                      

                                                                                             

20:14 Drug: NS 0.9% 500 ml Route: IV; Rate: bolus; Site: right forearm;                       ha1 

23:50 Follow up: Response: No adverse reaction; IV Status: Completed infusion; IV Intake:     ha1 

      500ml                                                                                       

22:00 Drug: Meropenem 1 grams Route: IV; Rate: per protocol; Site: left forearm;              ha1 

                                                                                             

04:15 Not Given (Duplicate Order): NS 0.9% 1000 ml IV at 125 ml/hr continuous                 ha1 

                                                                                                  

                                                                                                  

Medication:                                                                                       

                                                                                             

15:55 VIS not applicable for this client.                                                     ko1 

                                                                                                  

Intake:                                                                                           

23:50 IV: 500ml; Total: 500ml.                                                                ha1 

                                                                                             

03:50 IV: 1000ml; Total: 1500ml.                                                              ha1 

                                                                                                  

Outcome:                                                                                          

                                                                                             

18:48 Decision to Hospitalize by Provider.                                                    [facility] 

21:00 Admitted to ER Hold.  Please see [facility] for further documentation.                    ha1 

21:00 Condition: stable                                                                           

21:00 Instructed on the need for admit.                                                           

                                                                                             

06:15 Patient left the ED.                                                                    [facility] 

                                                                                                  

Signatures:                                                                                       

Dispatcher [employer]                           EDMS                                                 

Luis Cruz MD MD cha Smirch, Shelby RN                      Luis Dey PA PA cp Baxter, Heather, RN RN hb Jeffries, Jennifer jj6                                                  

Ninoska Ordoñez RN RN   [facility]                                                  

Lupe Rainey RN RN   ko                                                  

                                                                                                  

Corrections: (The following items were deleted from the chart)                                    

07:15 06:13 Reassessment: pt. transferred by stretcher. with tech and nurse. report given to  [facility] 

      YADIEL Kearney. [facility]                                                                                

:15 06:53 General: Appears uncomfortable, Behavior is calm, cooperative, [place] [id number] 

07:15 06:53 Pain: Complains of pain in left foot Pain does not radiate. Pain currently is 4   ha1 

      out of 10 on a pain scale. at worst was 9 out of 10 on a pain scale. Alleviated by          

      medications, [facility]                                                                            

:15 06:53 Neuro: Level of Consciousness is awake, alert, obeys commands, Oriented to        ha1 

      person, place, time, situation, [facility]                                                         

:15 06:53 Cardiovascular: Capillary refill < 3 seconds fingers Patient's skin is warm and   ha1 

      dry. ha1                                                                                    

:15 06:53 Respiratory: Airway is patent Trachea midline Respiratory effort is even,         ha1 

      unlabored, Respiratory pattern is regular, symmetrical, [facility]                                 

:15 06:53 GI: No signs and/or symptoms were reported involving the gastrointestinal system. ha1 

      Abdomen is non-distended, obese, ha1                                                        

:15 06:53 : ha1                                                                           [facility] 

:15 06:53 : Urine is cloudy, ha1                                                          ha1 

07:15 06:53 Derm: Wound noted right foot and left foot ha1                                    ha1 

07:15 06:53 Musculoskeletal: Range of motion: ha1                                             ha1 

                                                                                                  

**************************************************************************************************

## 2022-11-25 NOTE — EDPHYS
Physician Documentation                                                                           

 [facility]                                                                 

Name: Redd Dale Jr                                                                            

Age: 37 yrs                                                                                       

Sex: Male                                                                                         

: 1985                                                                                   

MRN: [id number]                                                                                   

Arrival Date: 2022                                                                          

Time: 14:18                                                                                       

Account#: [id number]                                                                            

Bed 20                                                                                            

Private MD:                                                                                       

ED Physician Luis Cruz                                                                      

HPI:                                                                                              

                                                                                             

17:18 This 37 yrs old Black Male presents to ER via EMS with complaints of Low Back Pain.     arlin 

17:18 The patient presents with pain that is acute, with no known mechanism of injury, that   arlin 

      is chronic. The symptoms are located in the low back. The pain does not radiate. The        

      problem was sustained from unknown cause. Modifying factors: The patient symptoms are       

      alleviated by nothing, the patient symptoms are aggravated by nothing. Associated signs     

      and symptoms: Pertinent positives: abdominal pain. Severity of symptoms: At their worst     

      the symptoms were mild, moderate, in the emergency department the symptoms are              

      unchanged. The patient has experienced similar episodes in the past.                        

                                                                                                  

Historical:                                                                                       

- Allergies:                                                                                      

14:27 Amoxicillin;                                                                            hb  

14:27 Bactrim;                                                                                hb  

14:27 Ciprofloxacin;                                                                          hb  

14:27 CLAVULANIC ACID;                                                                        hb  

14:27 Demerol;                                                                                hb  

14:27 Doxycycline;                                                                            hb  

14:27 Levofloxacin;                                                                           hb  

14:27 Morphine;                                                                               hb  

14:27 PENICILLINS;                                                                            hb  

14:27 Toradol;                                                                                hb  

14:27 TRIMETHOPRIM;                                                                           hb  

14:27 Vancomycin;                                                                             hb  

14:27 Zofran;                                                                                 hb  

- PMHx:                                                                                           

17:51 Asthma; Cerebral Palsy; cluster headaches; decubitus ulcers on feet; GERD;              ss  

      Hydrocephalus; Hypertension; spina bifida;                                                  

                                                                                                  

- Immunization history:: Adult Immunizations unknown.                                             

- Family history:: not pertinent.                                                                 

- Social history:: Smoking status: Patient denies any tobacco usage or history of.                

                                                                                                  

                                                                                                  

ROS:                                                                                              

17:18 Constitutional: Negative for fever, chills, and weight loss, Eyes: Negative for injury, arlin 

      pain, redness, and discharge, ENT: Negative for injury, pain, and discharge, Neck:          

      Negative for injury, pain, and swelling, Cardiovascular: Negative for chest pain,           

      palpitations, and edema, Respiratory: Negative for shortness of breath, cough,              

      wheezing, and pleuritic chest pain, : Negative for injury, bleeding, discharge, and       

      swelling, Skin: Negative for injury, rash, and discoloration, Neuro: Negative for           

      headache, weakness, numbness, tingling, and seizure, Psych: Negative for depression,        

      anxiety, suicide ideation, homicidal ideation, and hallucinations, Allergy/Immunology:      

      Negative for hives, rash, and allergies, Endocrine: Negative for neck swelling,             

      polydipsia, polyuria, polyphagia, and marked weight changes, Hematologic/Lymphatic:         

      Negative for swollen nodes, abnormal bleeding, and unusual bruising.                        

17:18 Abdomen/GI: Positive for abdominal pain, of the epigastric area, right upper quadrant       

      and left upper quadrant.                                                                    

17:18 MS/extremity: Positive for laceration, tenderness, of the left calf.                        

                                                                                                  

Exam:                                                                                             

17:18 Constitutional:  This is a well developed, well nourished patient who is awake, alert,  arlin 

      and in no acute distress. Head/Face:  Normocephalic, atraumatic. Eyes:  Pupils equal        

      round and reactive to light, extra-ocular motions intact.  Lids and lashes normal.          

      Conjunctiva and sclera are non-icteric and not injected.  Cornea within normal limits.      

      Periorbital areas with no swelling, redness, or edema. ENT:  Nares patent. No nasal         

      discharge, no septal abnormalities noted.  Tympanic membranes are normal and external       

      auditory canals are clear.  Oropharynx with no redness, swelling, or masses, exudates,      

      or evidence of obstruction, uvula midline.  Mucous membranes moist. Neck:  Trachea          

      midline, no thyromegaly or masses palpated, and no cervical lymphadenopathy.  Supple,       

      full range of motion without nuchal rigidity, or vertebral point tenderness.  No            

      Meningismus. Chest/axilla:  Normal chest wall appearance and motion.  Nontender with no     

      deformity.  No lesions are appreciated. Cardiovascular:  Regular rate and rhythm with a     

      normal S1 and S2.  No gallops, murmurs, or rubs.  Normal PMI, no JVD.  No pulse             

      deficits. Respiratory:  Lungs have equal breath sounds bilaterally, clear to                

      auscultation and percussion.  No rales, rhonchi or wheezes noted.  No increased work of     

      breathing, no retractions or nasal flaring. Back:  No spinal tenderness.  No                

      costovertebral tenderness.  Full range of motion. Male :  Normal genitalia with no        

      discharge or lesions. Skin:  Warm, dry with normal turgor.  Normal color with no            

      rashes, no lesions, and no evidence of cellulitis. Neuro:  Awake and alert, GCS 15,         

      oriented to person, place, time, and situation.  Cranial nerves II-XII grossly intact.      

      Motor strength 5/5 in all extremities.  Sensory grossly intact.  Cerebellar exam            

      normal.  Normal gait. Psych:  Awake, alert, with orientation to person, place and time.     

       Behavior, mood, and affect are within normal limits.                                       

17:18 Abdomen/GI: Inspection: abdomen appears normal, distension, that is mild, Bowel sounds:     

      active, all quadrants, Palpation: mild abdominal tenderness, in the epigastric area,        

      right upper quadrant and left upper quadrant, Liver: no appreciated palpable                

      abnormalities, Hernia: not appreciated.                                                     

                                                                                                  

Vital Signs:                                                                                      

14:26  / 88; Pulse 68; Resp 16; Temp 97.4; Pulse Ox 100% on R/A; Pain 8/10;             hb  

15:55  / 109; Pulse 72; Resp 18; Pulse Ox 99% on R/A;                                   ko1 

17:00  / 106; Pulse 85;                                                                 ko1 

18:00  / 102; Pulse 78; Pulse Ox 98% ;                                                  ko1 

19:00  / 89; Pulse 91; Resp 20 S; Pulse Ox 98% on R/A;                                  ha1 

                                                                                                  

MDM:                                                                                              

15:59 Patient medically screened.                                                             [facility] 

17:22 Differential diagnosis: arthritis, strain, sciatica, UTI. Data reviewed: vital signs,   [facility] 

      nurses notes, lab test result(s), radiologic studies, CT scan. Data interpreted:            

      Cardiac monitor: rate is 72 beats/min, rhythm is regular, Pulse oximetry: on room air       

      is 99 %. Counseling: I had a detailed discussion with the patient and/or guardian           

      regarding: the historical points, exam findings, and any diagnostic results supporting      

      the discharge/admit diagnosis, the presence of at least one elevated blood pressure         

      reading (>120/80) during this emergency department visit, lab results, radiology            

      results.                                                                                    

                                                                                                  

                                                                                             

16:30 Order name: CBC with Diff; Complete Time: 18:43                                         [facility] 

                                                                                             

19:46 Interpretation: Normal except: WBC 13.90; RBC 6.21; HCT 52.7; ; RDW 16.9; NEUT A cp  

      10.1.                                                                                       

                                                                                             

16:30 Order name: CMP; Complete Time: 18:35                                                   [facility] 

                                                                                             

16:30 Order name: Lipase; Complete Time: 18:35                                                [facility] 

                                                                                             

16:30 Order name: Urine Microscopic Only; Complete Time: 17:37                                [facility] 

                                                                                             

19:46 Interpretation: Normal except: UWBC >50; URBC 21-50; UBACT 20-50; UWBC Clump Few.         

                                                                                             

16:55 Order name: Lactate w/ 2H reflex if indic.; Complete Time: 18:46                        [facility] 

                                                                                             

19:46 Interpretation: Abnormal: LAC 2.2.                                                        

                                                                                             

17:36 Order name: Urine Culture                                                               [facility]

                                                                                             

18:43 Order name: Blood Culture Adult (2)                                                     [facility] 

                                                                                             

19:58 Order name: Basic Metabolic Panel                                                       [facility]

                                                                                             

19:58 Order name: Basic Metabolic Panel                                                       [facility]

                                                                                             

19:58 Order name: CBC with Automated Diff                                                     [facility]

                                                                                             

19:58 Order name: CBC with Automated Diff                                                     [facility]

                                                                                             

22:14 Order name: Lactate Sepsis 2 HR Follow-up                                               [facility]

                                                                                             

05:11 Order name: SARS RAPID                                                                  [facility] 

                                                                                             

05:44 Order name: SARS-COV-2 Antigen Rapid                                                    [facility]

                                                                                             

16:30 Order name: CT Abd/Pelvis - Without Contrast; Complete Time: 19:45                      [facility] 

                                                                                             

16:30 Order name: IV Saline Lock; Complete Time: 18:34                                        [facility] 

                                                                                             

16:30 Order name: Labs collected and sent; Complete Time: 18:34                               [facility] 

                                                                                             

16:30 Order name: Urine Dipstick-Ancillary (obtain specimen); Complete Time: 18:34            [facility] 

                                                                                             

16:30 Order name: Wound dressing: LEFT POSTERIOR CALF; Complete Time: 05:31                   [facility] 

                                                                                             

18:03 Order name: Labs - recollect needed: please recollect the CBC; Complete Time: 18:34     [facility] 

                                                                                             

18:43 Order name: Chest Single View XRAY; Complete Time: 19:50                                [facility] 

                                                                                             

19:58 Order name: Regular                                                                     [facility]

                                                                                                  

Administered Medications:                                                                         

18:39 Drug: Pepcid (famotidine) 20 mg Route: IVP; Site: right forearm;                        ko1 

18:39 Drug: Dilaudid (HYDROmorphone) 1 mg Route: IVP; Site: right forearm;                    ko1 

19:10 Follow up: Response: No adverse reaction; Pain is decreased; RASS: Alert and Calm (0)   [facility] 

18:39 Drug: Phenergan (promethazine) 25 mg Route: IVP; Site: right forearm;                   ko1 

18:39 Drug: NS 0.9% 500 ml Route: IV; Rate: bolus; Site: right forearm;                       ko1 

20:14 Drug: NS 0.9% 1000 ml Route: IV; Rate: 1 bolus; Site: right forearm;                    ha1 

                                                                                             

03:50 Follow up: Response: No adverse reaction; IV Status: Completed infusion; IV Intake:     ha1 

      1000ml                                                                                      

                                                                                             

20:14 Drug: NS 0.9% 500 ml Route: IV; Rate: bolus; Site: right forearm;                       ha1 

23:50 Follow up: Response: No adverse reaction; IV Status: Completed infusion; IV Intake:     ha1 

      500ml                                                                                       

22:00 Drug: Meropenem 1 grams Route: IV; Rate: per protocol; Site: left forearm;              ha1 

                                                                                             

04:15 Not Given (Duplicate Order): NS 0.9% 1000 ml IV at 125 ml/hr continuous                 ha1 

                                                                                                  

                                                                                                  

Disposition Summary:                                                                              

22 18:48                                                                                    

Hospitalization Ordered                                                                           

      Hospitalization Status: Inpatient Admission                                             arlin 

      Condition: Fair                                                                         arlin 

      Problem: new                                                                            arlin 

      Symptoms: have improved                                                                 arlin 

      Bed/Room Type: Standard                                                                 arlin 

      Provider: Domingo Mc(22 19:52)                                                 cp  

      Location: Telemetry/MedSurg (Inpatient)(22 05:48)                                 cg  

      Room Assignment: [facility] [place][note 2](22 05:48)                                                      

      Diagnosis                                                                                   

        - Acute cystitis - suprapubic catherter                                               arlin 

        - Elevated white blood cell count                                                     arlin 

        - Cerebral palsy, unspecified                                                         arlin 

        - Obesity, unspecified                                                                arlin 

      Forms:                                                                                      

        - Medication Reconciliation Form                                                      arlin 

        - SBAR form                                                                           arlin 

Signatures:                                                                                       

Dispatcher [employer]                           EDLuis Messina MD MD cha Martinez, Eric                               em1                                                  

Crystal Phillip RN RN ss Page, Corey, PA PA cp Garcia, Cindy, RN RN                                                      

Josefa Bryson RN RN                                                      

Ninoska Ordoñez RN RN   [facility]                                                  

Lupe Rainey RN                       RN   ko1                                                  

                                                                                                  

Corrections: (The following items were deleted from the chart)                                    

                                                                                             

18:53 18:48 Telemetry/MedSurg (Inpatient) arlin                                                 cg  

18:53 18:48 arlin                                                                               cg  

19:52 18:48 Himanshu Mckeon cha                                                                 cp  

                                                                                             

05:48  18:53 [facility] ER HOLD cg                                                             cg  

                                                                                             

05:48 11/25 18:53 ERHOLD- [facility]  

                                                                                                  

**************************************************************************************************
normal...

## 2022-11-25 NOTE — RAD REPORT
EXAM DESCRIPTION:  [place] Single View11/25/2022 7:33 pm

 

CLINICAL HISTORY:  Cough

 

COMPARISON:  June 2022

 

FINDINGS:   The lungs appear clear of acute infiltrate. The heart is normal size.

 

A  shunt courses the chest

 

IMPRESSION:   No acute abnormalities displayed

## 2022-11-26 LAB
BUN BLD-MCNC: 16 MG/DL (ref 7–18)
GLUCOSE SERPLBLD-MCNC: 138 MG/DL (ref 74–106)
HCT VFR BLD CALC: 46.4 % (ref 39.6–49)
LYMPHOCYTES # SPEC AUTO: 1.7 K/UL (ref 0.7–4.9)
MCV RBC: 85.2 FL (ref 80–100)
PMV BLD: 7.7 FL (ref 7.6–11.3)
POTASSIUM SERPL-SCNC: 3.9 MMOL/L (ref 3.5–5.1)
RBC # BLD: 5.45 M/UL (ref 4.33–5.43)

## 2022-11-26 RX ADMIN — SODIUM CHLORIDE SCH: 0.9 INJECTION, SOLUTION INTRAVENOUS at 20:00

## 2022-11-26 RX ADMIN — SODIUM CHLORIDE SCH MLS: 9 INJECTION, SOLUTION INTRAVENOUS at 22:04

## 2022-11-26 RX ADMIN — Medication SCH ML: at 22:04

## 2022-11-26 RX ADMIN — SODIUM CHLORIDE SCH MLS: 0.9 INJECTION, SOLUTION INTRAVENOUS at 11:29

## 2022-11-26 RX ADMIN — SODIUM CHLORIDE SCH: 0.9 INJECTION, SOLUTION INTRAVENOUS at 04:00

## 2022-11-26 RX ADMIN — HYDROMORPHONE HYDROCHLORIDE PRN MG: 4 TABLET ORAL at 20:25

## 2022-11-26 RX ADMIN — HYDROMORPHONE HYDROCHLORIDE PRN MG: 4 TABLET ORAL at 11:24

## 2022-11-26 RX ADMIN — Medication SCH ML: at 09:04

## 2022-11-26 RX ADMIN — Medication SCH PKT: at 22:04

## 2022-11-26 RX ADMIN — SODIUM CHLORIDE SCH MLS: 9 INJECTION, SOLUTION INTRAVENOUS at 09:04

## 2022-11-26 NOTE — P.HP
Certification for Inpatient


Patient admitted to: Inpatient


With expected LOS: >2 Midnights


Patient will require the following post-hospital care: None


Practitioner: I am a practitioner with admitting privileges, knowledge of 

patient current condition, hospital course, and medical plan of care.


Services: Services provided to patient in accordance with Admission requirements

found in Title 42 Section 412.3 of the Code of Federal Regulations





Patient History


Date of Service: 11/26/22


Primary Care Provider: Jesusita


Reason for admission: UTI 


History of Present Illness: 





Patient is an office patient of mine.  He has a history of Spina Bifida.  He is 

wheelchair bound, chronic pressure ulcer, dm2 and chronic pain syndrome.  For 

the past few days he has been having some malasia.  Had fever and chills last 

night as well as some nausea.  Decided to come to the ER.  The patient was 

admitted as he had a wbc of 13 and leukocytes in his urine  As he has a chronic 

suprapubic catheter this is a complicated uti.  He has had esbl in the past. 


Allergies





levofloxacin [From Levaquin] Allergy (Intermediate, Verified 03/14/22 12:15)


   Hives


morphine Allergy (Intermediate, Verified 03/14/22 12:15)


   Hives


sulfamethoxazole [From Bactrim] Allergy (Intermediate, Verified 03/14/22 12:15)


   Hives


ketorolac tromethamine [From Toradol] Allergy (Verified 03/14/22 12:15)


   Nausea/Vomiting


Penicillins Allergy (Verified 03/14/22 12:15)


   Hives/Rash


vancomycin Allergy (Verified 03/14/22 12:15)


   Hives


ondansetron [From Zofran (as hydrochloride)] Adverse Reaction (Mild, Verified 

03/14/22 12:15)


   Nausea/Vomiting


amoxicillin [From Augmentin] Adverse Reaction (Verified 03/14/22 12:15)


   Hives/Rash


ciprofloxacin Adverse Reaction (Verified 03/14/22 12:15)


   Nausea/Vomiting


doxycycline Adverse Reaction (Verified 03/14/22 12:15)


   Nausea/Vomiting


sesame seed Adverse Reaction (Verified 03/14/22 12:15)


   diarrhea


pop corn Adverse Reaction (Severe, Uncoded 03/14/22 12:15)


   diarrhea





Home Medications: 








Hydromorphone [Dilaudid] 4 mg PO Q8HP PRN 03/14/22 


Insulin Glargine,Hum.rec.anlog [Semglee] 40 unit SQ DAILY 90 Days #40 ml 

06/16/22 


Yordan [Yordan*] 1 pkt PO BID 90 Days #90 powd.pack 06/16/22 


Medihoney [Medihoney Woundcare Gel*] 1 appl TOP DAILY 90 Days #3 tube 06/16/22 


Pen Needle, Diabetic, Safety [Assure Id Pen Needle] 1 each MC DAILY 90 Days #90 

dis.needle 06/16/22 








- Past Medical/Surgical History


Has patient received pneumonia vaccine in the past: No


Diabetic: No


-: Spina Bifida with hydrocephalus


-: Depression


-: Insomnia


-: Incontinence


-: GERD


-: Chronic pain syndrome


-: Muscle wasting


-: Paraplegia


-: Shunt revision


-: Cholecystectomy


-: Foot surgery


-: Hip sx BL


-: Bilateral hip surgery


-: Appendectomy


Psychosocial/ Personal History: Patient currently single.  He has no children.  

He is disabled.





- Family History


  ** Father


-: Hypertension





  ** Mother


-: Hypertension





- Social History


Smoking Status: Current some day smoker


Alcohol use: Yes


CD- Drugs: No


Caffeine use: Yes





Review of Systems


10-point ROS is otherwise unremarkable


General: Fever, Chills


Gastrointestinal: Nausea





Physical Examination





- Vital Signs


Temperature: 97.2 F


Blood Pressure: 113/72


Pulse: 93


Respirations: 16


Pulse Ox (%): 95





- Physical Exam


General: Alert, In no apparent distress


HEENT: Atraumatic, PERRLA, Mucous membr. moist/pink, EOMI, Sclerae nonicteric


Neck: Supple, 2+ carotid pulse no bruit, No LAD, Without JVD or thyroid 

abnormality


Respiratory: Clear to auscultation bilaterally, Normal air movement


Cardiovascular: Regular rate/rhythm, Normal S1 S2


Gastrointestinal: Normal bowel sounds, No tenderness


Musculoskeletal: No tenderness


Integumentary: No rashes


Neurological: Normal gait, Normal speech, Normal strength at 5/5 x4 extr, Normal

tone, Normal affect


Lymphatics: No axilla or inguinal lymphadenopathy





- Studies


Laboratory Data (last 24 hrs)





11/25/22 18:25: WBC 13.90 H, Hgb 17.3, Hct 52.7 H, Plt Count 513 H


11/25/22 17:47: Sodium 137, Potassium 4.8, BUN 15, Creatinine 0.73, Glucose 118 

H, Total Bilirubin 0.5, AST 21, ALT 41, Alkaline Phosphatase 127 H, Lipase 113








Assessment and Plan





- Problems (Diagnosis)


(1) Complicated UTI (urinary tract infection)


Current Visit: Yes   Status: Acute   


Plan: 


Will admit the patient. Start him on meropenem.  Await cultures. 








(2) Chronic indwelling Ortega catheter


Onset Date: 11/03/17   Current Visit: No   Status: Chronic   


Plan: 


Currently stable. May consult urology on Monday if it needs to be changed.  he 

has difficulty getting to offices. 








(3) Chronic pain disorder


Onset Date: 11/03/17   Current Visit: No   Status: Chronic   


Plan: 


restart his home dilaudid dosage.  he is under treatment by Dr. Lennon. 








(4) Diabetes


Current Visit: No   Status: Chronic   


Plan: 


will continue his lantus and start him on a sliding scale 


Qualifiers: 


   Diabetes mellitus type: type 2   Diabetes mellitus long term insulin use: 

with long term use   Diabetes mellitus complication status: with skin 

complications   Diabetes mellitus complication detail: with foot ulcer   

Qualified Code(s): E11.621 - Type 2 diabetes mellitus with foot ulcer; L97.509 -

Non-pressure chronic ulcer of other part of unspecified foot with unspecified 

severity; Z79.4 - Long term (current) use of insulin   





(5) Spina bifida


Onset Date: 11/03/17   Current Visit: No   Status: Chronic   


Plan: 


chronic wheelchair bound


Qualifiers: 


   Spinal region: lumbar   Presence of hydrocephalus: without hydrocephalus   

Qualified Code(s): Q05.7 - Lumbar spina bifida without hydrocephalus   





(6) Stage II pressure ulcer of right ankle


Current Visit: No   Status: Resolved   


Discharge Plan: Home


Plan to discharge in: 24 Hours





- Advance Directives


Does patient have a Living Will: No


Does patient have a Durable POA for Healthcare: No





- Code Status/Comfort Care


Code Status Assessed: No


Code Status: Full Code


Physician Review: Patient Assessed, Agree with Above Assessment and Plan


Critical Care: No


Time Spent Managing Pts Care (In Minutes): 45

## 2022-11-27 RX ADMIN — Medication SCH: at 09:00

## 2022-11-27 RX ADMIN — Medication SCH: at 20:51

## 2022-11-27 RX ADMIN — SODIUM CHLORIDE SCH MLS: 9 INJECTION, SOLUTION INTRAVENOUS at 09:14

## 2022-11-27 RX ADMIN — PROMETHAZINE HYDROCHLORIDE PRN MG: 25 TABLET ORAL at 17:24

## 2022-11-27 RX ADMIN — SODIUM CHLORIDE SCH MLS: 0.9 INJECTION, SOLUTION INTRAVENOUS at 16:46

## 2022-11-27 RX ADMIN — Medication SCH PKT: at 09:06

## 2022-11-27 RX ADMIN — Medication SCH ML: at 20:52

## 2022-11-27 RX ADMIN — SODIUM CHLORIDE SCH: 0.9 INJECTION, SOLUTION INTRAVENOUS at 20:00

## 2022-11-27 RX ADMIN — Medication SCH ML: at 09:06

## 2022-11-27 RX ADMIN — SODIUM CHLORIDE SCH MLS: 0.9 INJECTION, SOLUTION INTRAVENOUS at 06:32

## 2022-11-27 RX ADMIN — HYDROMORPHONE HYDROCHLORIDE PRN MG: 4 TABLET ORAL at 17:23

## 2022-11-27 RX ADMIN — HYDROMORPHONE HYDROCHLORIDE PRN MG: 4 TABLET ORAL at 04:39

## 2022-11-27 RX ADMIN — INSULIN GLARGINE SCH UNIT: 100 INJECTION, SOLUTION SUBCUTANEOUS at 09:16

## 2022-11-27 RX ADMIN — PROMETHAZINE HYDROCHLORIDE PRN MG: 25 TABLET ORAL at 09:14

## 2022-11-27 RX ADMIN — SODIUM CHLORIDE SCH MLS: 9 INJECTION, SOLUTION INTRAVENOUS at 20:49

## 2022-11-27 NOTE — P.PN
Subjective


Date of Service: 11/27/22


Primary Care Provider: Jesusita


Chief Complaint: UTI 


Subjective: No new changes, C/O voiced (nausea)





Review of Systems


10-point ROS is otherwise unremarkable


Gastrointestinal: Nausea





Physical Examination





- Vital Signs


Temperature: 97.1 F


Blood Pressure: 106/66


Pulse: 91


Respirations: 16


Pulse Ox (%): 96





- Studies


Microbiology Data (last 24 hrs): 








11/25/22 17:00   Clean Catch Urine   Jay Count - Final


                            >100,000 CFU/ML.


11/25/22 17:00   Clean Catch Urine    - Final


                            Proteus Mirabilis








Assessment And Plan





- Current Problems (Diagnosis)


(1) Complicated UTI (urinary tract infection)


Current Visit: Yes   Status: Acute   


Plan: 


Will admit the patient. Start him on meropenem.  Await cultures. 


11/27


awaiting cultures








(2) Chronic indwelling Ortega catheter


Onset Date: 11/03/17   Current Visit: No   Status: Chronic   


Plan: 


Currently stable. May consult urology on Monday if it needs to be changed.  he 

has difficulty getting to offices. 








(3) Chronic pain disorder


Onset Date: 11/03/17   Current Visit: No   Status: Chronic   


Plan: 


restart his home dilaudid dosage.  he is under treatment by Dr. Lennon. 








(4) Diabetes


Current Visit: No   Status: Chronic   


Plan: 


will continue his lantus and start him on a sliding scale 


Qualifiers: 


   Diabetes mellitus type: type 2   Diabetes mellitus long term insulin use: 

with long term use   Diabetes mellitus complication status: with skin complica

tions   Diabetes mellitus complication detail: with foot ulcer   Qualified 

Code(s): E11.621 - Type 2 diabetes mellitus with foot ulcer; L97.509 - Non-

pressure chronic ulcer of other part of unspecified foot with unspecified 

severity; Z79.4 - Long term (current) use of insulin   





(5) Spina bifida


Onset Date: 11/03/17   Current Visit: No   Status: Chronic   


Plan: 


chronic wheelchair bound


Qualifiers: 


   Spinal region: lumbar   Presence of hydrocephalus: without hydrocephalus   

Qualified Code(s): Q05.7 - Lumbar spina bifida without hydrocephalus   





(6) Stage II pressure ulcer of right ankle


Current Visit: No   Status: Resolved   


Discharge Plan: Home


Plan to discharge in: 24 Hours





- Code Status/Comfort Care


Code Status Assessed: No


Physician Review: Patient Assessed, Agree with Above Assessment and Plan


Critical Care: No


Time Spent Managing PTS Care (In Minutes): 20

## 2022-11-28 VITALS — SYSTOLIC BLOOD PRESSURE: 139 MMHG | DIASTOLIC BLOOD PRESSURE: 84 MMHG | TEMPERATURE: 97.8 F

## 2022-11-28 VITALS — OXYGEN SATURATION: 91 %

## 2022-11-28 RX ADMIN — PROMETHAZINE HYDROCHLORIDE PRN MG: 25 TABLET ORAL at 11:31

## 2022-11-28 RX ADMIN — HYDROMORPHONE HYDROCHLORIDE PRN MG: 4 TABLET ORAL at 01:42

## 2022-11-28 RX ADMIN — Medication SCH: at 07:40

## 2022-11-28 RX ADMIN — INSULIN GLARGINE SCH UNIT: 100 INJECTION, SOLUTION SUBCUTANEOUS at 08:58

## 2022-11-28 RX ADMIN — HYDROMORPHONE HYDROCHLORIDE PRN MG: 4 TABLET ORAL at 11:31

## 2022-11-28 RX ADMIN — Medication SCH ML: at 08:58

## 2022-11-28 RX ADMIN — SODIUM CHLORIDE SCH: 9 INJECTION, SOLUTION INTRAVENOUS at 09:00

## 2022-11-28 RX ADMIN — SODIUM CHLORIDE SCH MLS: 0.9 INJECTION, SOLUTION INTRAVENOUS at 01:41

## 2022-11-28 RX ADMIN — PROMETHAZINE HYDROCHLORIDE PRN MG: 25 TABLET ORAL at 01:42

## 2022-11-28 RX ADMIN — SODIUM CHLORIDE SCH MLS: 9 INJECTION, SOLUTION INTRAVENOUS at 08:59

## 2022-11-28 NOTE — P.DS
Admission Date: 11/25/22


Discharge Date: 11/28/22


Primary Care Provider: Jesusita


Reason for Admission: UTI 





- Problems


(1) Complicated UTI (urinary tract infection)


Current Visit: Yes   Status: Acute   





(2) Chronic indwelling Ortega catheter


Onset Date: 11/03/17   Current Visit: No   Status: Chronic   





(3) Chronic pain disorder


Onset Date: 11/03/17   Current Visit: No   Status: Chronic   





(4) Diabetes


Current Visit: No   Status: Chronic   


Qualifiers: 


   Diabetes mellitus type: type 2   Diabetes mellitus long term insulin use: 

with long term use   Diabetes mellitus complication status: with skin 

complications   Diabetes mellitus complication detail: with foot ulcer   

Qualified Code(s): E11.621 - Type 2 diabetes mellitus with foot ulcer; L97.509 -

Non-pressure chronic ulcer of other part of unspecified foot with unspecified 

severity; Z79.4 - Long term (current) use of insulin   





(5) Spina bifida


Onset Date: 11/03/17   Current Visit: No   Status: Chronic   


Qualifiers: 


   Spinal region: lumbar   Presence of hydrocephalus: without hydrocephalus   

Qualified Code(s): Q05.7 - Lumbar spina bifida without hydrocephalus   





(6) Stage II pressure ulcer of right ankle


Current Visit: No   Status: Resolved   


Brief History of Present Illness: 





Patient is an office patient of mine.  He has a history of Spina Bifida.  He is 

wheelchair bound, chronic pressure ulcer, dm2 and chronic pain syndrome.  For 

the past few days he has been having some malasia.  Had fever and chills last 

night as well as some nausea.  Decided to come to the ER.  The patient was 

admitted as he had a wbc of 13 and leukocytes in his urine  As he has a chronic 

suprapubic catheter this is a complicated uti.  He has had esbl in the past. 


Hospital Course: 





Patient was admitted for UTI.  Was found to be proteus mirableus.  Sensitive to 

ceftriaxone.  He is willing to give himself IM injections.  Will have wound care

look at his wounds.  Will need to screen him for sleep apnea and order a home 

sleep study for the patient.  He does not have a cpap at this time.  he wakes up

with headaches frequently 


Vital Signs/Physical Exam: 














Temp Pulse Resp BP Pulse Ox


 


 97.8 F   95 H  18   139/84   97 


 


 11/28/22 12:00  11/28/22 12:00  11/28/22 12:00  11/28/22 12:00  11/28/22 12:00








General: Alert, In no apparent distress


HEENT: Atraumatic, PERRLA, EOMI


Neck: Supple, JVD not distended


Respiratory: Clear to auscultation bilaterally, Normal air movement


Cardiovascular: Regular rate/rhythm, Normal S1 S2


Gastrointestinal: Normal bowel sounds, No tenderness


Musculoskeletal: No tenderness


Integumentary: No rashes


Neurological: Normal speech, Normal tone, Normal affect


Lymphatics: No axilla or inguinal lymphadenopathy


Laboratory Data at Discharge: 














WBC  9.30 K/uL (4.3-10.9)   11/26/22  06:58    


 


Hgb  15.5 g/dL (13.6-17.9)  D 11/26/22  06:58    


 


Hct  46.4 % (39.6-49.0)   11/26/22  06:58    


 


Plt Count  427 K/uL (152-406)  H  11/26/22  06:58    


 


Sodium  141 mmol/L (136-145)   11/26/22  06:58    


 


Potassium  3.9 mmol/L (3.5-5.1)  D 11/26/22  06:58    


 


BUN  16 mg/dL (7-18)   11/26/22  06:58    


 


Creatinine  0.63 mg/dL (0.55-1.3)   11/26/22  06:58    


 


Glucose  138 mg/dL ()  H  11/26/22  06:58    


 


Total Bilirubin  0.5 mg/dL (0.2-1.0)   11/25/22  17:47    


 


AST  21 U/L (15-37)   11/25/22  17:47    


 


ALT  41 U/L (12-78)   11/25/22  17:47    


 


Alkaline Phosphatase  127 U/L ()  H  11/25/22  17:47    


 


Lipase  113 U/L ()   11/25/22  17:47    








Home Medications: 








Hydromorphone [Dilaudid] 4 mg PO Q8HP PRN 03/14/22 


Insulin Glargine,Hum.rec.anlog [Semglee] 40 unit SQ DAILY 90 Days #40 ml 

06/16/22 


Yordan [Yordan*] 1 pkt PO BID 90 Days #90 powd.pack 06/16/22 


Medihoney [Medihoney Woundcare Gel*] 1 appl TOP DAILY 90 Days #3 tube 06/16/22 


Pen Needle, Diabetic, Safety [Assure Id Pen Needle] 1 each MC DAILY 90 Days #90 

dis.needle 06/16/22 


Amox/Clavulanate [Augmentin 875-125 Tab] 1 each PO BID 7 Days #14 tab 11/28/22 


Ceftriaxone [Rocephin*] 1,000 mg IM DAILY 7 Days #7 ml 11/28/22 


Smz./Tmp. [Bactrim Ds 800 MG/160 MG] 1 tab PO BID 7 Days #14 tab 11/28/22 





New Medications: 


Amox/Clavulanate [Augmentin 875-125 Tab] 1 each PO BID 7 Days #14 tab


Smz./Tmp. [Bactrim Ds 800 MG/160 MG] 1 tab PO BID 7 Days #14 tab


Ceftriaxone [Rocephin*] 1,000 mg IM DAILY 7 Days #7 ml


Diet: ADA


Activity: Ad rickie


Followup: 


Domingo Mc MD [Primary Care Provider] - 


Physician Review: Patient Assessed, Agree with Above Assessment and Plan


Time spent managing pt's care (in minutes): 50

## 2022-12-14 ENCOUNTER — HOSPITAL ENCOUNTER (EMERGENCY)
Dept: HOSPITAL 97 - ER | Age: 37
Discharge: HOME | End: 2022-12-14
Payer: COMMERCIAL

## 2022-12-14 VITALS — DIASTOLIC BLOOD PRESSURE: 90 MMHG | SYSTOLIC BLOOD PRESSURE: 142 MMHG

## 2022-12-14 VITALS — TEMPERATURE: 97.7 F

## 2022-12-14 VITALS — OXYGEN SATURATION: 97 %

## 2022-12-14 DIAGNOSIS — Z20.822: ICD-10-CM

## 2022-12-14 DIAGNOSIS — R19.7: Primary | ICD-10-CM

## 2022-12-14 DIAGNOSIS — E83.42: ICD-10-CM

## 2022-12-14 DIAGNOSIS — R53.1: ICD-10-CM

## 2022-12-14 LAB
ALBUMIN SERPL BCP-MCNC: 3.2 G/DL (ref 3.4–5)
ALP SERPL-CCNC: 179 U/L (ref 45–117)
ALT SERPL W P-5'-P-CCNC: 190 U/L (ref 16–61)
AST SERPL W P-5'-P-CCNC: 23 U/L (ref 15–37)
BUN BLD-MCNC: 11 MG/DL (ref 7–18)
GLUCOSE SERPLBLD-MCNC: 135 MG/DL (ref 74–106)
HCT VFR BLD CALC: 47.4 % (ref 39.6–49)
INR BLD: 1.11
LYMPHOCYTES # SPEC AUTO: 1.4 K/UL (ref 0.7–4.9)
MAGNESIUM SERPL-MCNC: 1.4 MG/DL (ref 1.6–2.4)
MCV RBC: 84.6 FL (ref 80–100)
NT-PROBNP SERPL-MCNC: 24 PG/ML (ref ?–125)
PMV BLD: 7.6 FL (ref 7.6–11.3)
POTASSIUM SERPL-SCNC: 3.5 MMOL/L (ref 3.5–5.1)
RBC # BLD: 5.6 M/UL (ref 4.33–5.43)
SARS-COV-2 RNA RESP QL NAA+PROBE: NEGATIVE
TROPONIN I SERPL HS-MCNC: 4.3 PG/ML (ref ?–58.9)

## 2022-12-14 PROCEDURE — 71045 X-RAY EXAM CHEST 1 VIEW: CPT

## 2022-12-14 PROCEDURE — 93005 ELECTROCARDIOGRAM TRACING: CPT

## 2022-12-14 PROCEDURE — 84484 ASSAY OF TROPONIN QUANT: CPT

## 2022-12-14 PROCEDURE — 87205 SMEAR GRAM STAIN: CPT

## 2022-12-14 PROCEDURE — 80053 COMPREHEN METABOLIC PANEL: CPT

## 2022-12-14 PROCEDURE — 83735 ASSAY OF MAGNESIUM: CPT

## 2022-12-14 PROCEDURE — 83880 ASSAY OF NATRIURETIC PEPTIDE: CPT

## 2022-12-14 PROCEDURE — 80076 HEPATIC FUNCTION PANEL: CPT

## 2022-12-14 PROCEDURE — 87070 CULTURE OTHR SPECIMN AEROBIC: CPT

## 2022-12-14 PROCEDURE — 83605 ASSAY OF LACTIC ACID: CPT

## 2022-12-14 PROCEDURE — 0240U: CPT

## 2022-12-14 PROCEDURE — 85610 PROTHROMBIN TIME: CPT

## 2022-12-14 PROCEDURE — 85730 THROMBOPLASTIN TIME PARTIAL: CPT

## 2022-12-14 PROCEDURE — 36415 COLL VENOUS BLD VENIPUNCTURE: CPT

## 2022-12-14 PROCEDURE — 85025 COMPLETE CBC W/AUTO DIFF WBC: CPT

## 2022-12-14 PROCEDURE — 87040 BLOOD CULTURE FOR BACTERIA: CPT

## 2022-12-14 PROCEDURE — 74176 CT ABD & PELVIS W/O CONTRAST: CPT

## 2022-12-14 PROCEDURE — 71250 CT THORAX DX C-: CPT

## 2022-12-14 NOTE — XMS REPORT
Continuity of Care Document

                          Created on:2022



Patient:REDD VERDIN JR

Sex:Male

:1985

External Reference #:[id number]





Demographics







                          Address                   [address]



                                                    [place] [id number]

 

                          Home Phone                [phone]

 

                          Work Phone                [phone]

 

                          Mobile Phone              [phone]

 

                          Email Address             [email or web address]

 

                          Preferred Language        English

 

                          Marital Status            Unknown

 

                          M[employer]on Affiliation     Unknown

 

                          Race                      Unknown

 

                          Additional Race(s)        Unavailable



                                                    Black or 

 

                          Ethnic Group              Unknown









Author







                          Organization              [facility]

t

 

                          Address                   [address]



                                                    [place] [address]

 

                          Phone                     [phone]









Support







                Name            Relationship    Address         Phone

 

                SABRINA VERDIN  Mother          [address]           [phone]



                                                [address] [place] [id number] 

 

                Sabrina Verdin  Mother          [address] [phone]



                                                [place] [address] 

 

                one else per patient, No Unavailable     Unavailable     Unavail

able

 

                Sabrina Verdin  Mother          [address] [phone]

[phone]



                                                [place] [address] 

 

                PATIENT, NO ONE ELSE PER Unavailable     Unavailable     Unavail

able

 

                PHYILLIS        Grandparent     Unavailable     Unavailable

 

                SABRINA VERDIN M               [address]    Unavailabl

e



                                                [place] [address] 

 

                Sabrina Bustillo Mother          [address]    [phone]

[phone]



                                                [place] [address] 

 

                YAZ, SMITA  Grandparent     Unavailable     [phone]

 

                REDD VERDIN  F               [address]    Unavailable



                                                [place] [address] 

 

                O'GREG, SMITA LAURA Grandparent     [address]       Unavailable



                                                [place] [address] 

 

                Laura O'Greg, Smita Grandparent     PO       [phone]



                                                [place] [address] 

 

                Chito OliverNenar Father          [address]    [phone]

[phone]



                                                [place] [address] 









Care Team Providers







                    Name                Role                Phone

 

                    Sharpless           Primary Care Physician [phone]

 

                    MARY ANNE RAMIREZ             Attending Clinician Unavailable

 

                    MARY ANNE RAMIREZ             Attending Clinician Unavailable

 

                    SUREKHA HUYNH      Attending Clinician Unavailable

 

                    Lab, Ang - Db       Attending Clinician Unavailable

 

                    EDWARDO LOPEZ       Attending Clinician Unavailable

 

                    Candace LAWRENCE, Neeta NAM Attending Clinician [phone]

 

                    Edwardo Lopez DO    Attending Clinician [phone]

 

                    ALMA VILLASEÑOR    Attending Clinician Unavailable

 

                    Kasi FNP, Alma PAN Attending Clinician [phone]

 

                    ASHLEY GARAY    Attending Clinician Unavailable

 

                    Ashley Garay MD Attending Clinician [phone]

 

                    Dulce Guzman LVN Attending Clinician [phone]

 

                    RACHEL BAILEY      Attending Clinician Unavailable

 

                    Davey HA, Yoni Attending Clinician [phone]

 

                    Rachel Bailey MD   Attending Clinician [phone]

 

                    SAPPHIRE BONDS A Attending Clinician Unavailable

 

                    Scooter COTTO, Yessica NELSON Attending Clinician Unavailable

 

                    Jessi Topete MD  Attending Clinician [phone]

 

                    Ronel COTTO, Stephany ARAUJO Attending Clinician [phone]

 

                    SCOOBY SHARPE   Attending Clinician Unavailable

 

                    Thanh FNP, Yo B Attending Clinician [phone]

 

                    Jojo MEADOWS, Keenan Olmedo Attending Clinician [phone]

[phone]

 

                    Trish MEADOWS, An OH   Attending Clinician [phone]

 

                    Alan MEADOWS, Liz G  Attending Clinician [phone]

 

                    Scooby Sharpe MD Attending Clinician [phone]

 

                    Sheridan Delarosa MD Attending Clinician [phone]

 

                    Shahid MEADOWS, Mac ARAUJO   Attending Clinician [phone]

 

                    Vamshi ALEGRE, Sapphire A Attending Clinician [phone]

 

                    KAYLYNN LU      Attending Clinician Unavailable

 

                    Ashly Greene  Attending Clinician [phone]

 

                    Kaylynn Lu MD   Attending Clinician [phone]

 

                    RADHA MEADOWS Attending Clinician Unavailable

 

                    CHRIS SOL     Attending Clinician Unavailable

 

                    Alondra Santos MD Attending Clinician [phone]

 

                    Chris Sol DO  Attending Clinician [phone]

 

                    RADHA FOFANA     Attending Clinician Unavailable

 

                    Radha Fofana MD  Attending Clinician [phone]

 

                    TREY MEADOWS    Attending Clinician Unavailable

 

                    Noe Phillips MD     Attending Clinician [phone]

 

                    Trey Meadows MD Attending Clinician [phone]

 

                    Josse Alonso Attending Clinician [phone]

 

                    Shelton Pradhan Attending Clinician [phone]

 

                    DEAN SEGOVIA    Attending Clinician Unavailable

 

                    Tobias Hernandez MD  Attending Clinician [phone]

 

                    Shar MEADOWS, Efrain Attending Clinician [phone]

 

                    Andre MEADOWS, Martha SMITH  Attending Clinician [phone]

 

                    Dean Segovia MD Attending Clinician Unavailable

 

                    RAND DONG      Attending Clinician Unavailable

 

                    Rand Dong MD   Attending Clinician [phone]

 

                    Trey Fairchild Attending Clinician [phone]

 

                    TREY FAIRCHILD Attending Clinician Unavailable

 

                    Bernardo De Souza Attending Clinician [phone]

 

                    ALLISON ROMEO     Attending Clinician Unavailable

 

                    ALLISON Carver Attending Clinician [phone]

 

                    NEETA MARIA    Attending Clinician Unavailable

 

                    Only, Ang Db Test   Attending Clinician Unavailable

 

                    Nicole Llanes MD     Attending Clinician [phone]

 

                    NICOLE LLANES        Attending Clinician Unavailable

 

                    Acacia Contreras Attending Clinician [phone]

 

                    ACACIA NAJERA Attending Clinician Unavailable

 

                    MARTY RIVERS    Attending Clinician Unavailable

 

                    Deisy Linares   Attending Clinician [phone]

 

                    Deedee Reinoso   Attending Clinician [phone]

 

                    RAMU TORRES Attending Clinician Unavailable

 

                    EDWARDO LOPEZ       Admitting Clinician Unavailable

 

                    Edwardo Lopez DO    Admitting Clinician [phone]

 

                    ASHLEY GARAY    Admitting Clinician Unavailable

 

                    RACHEL BAILEY      Admitting Clinician Unavailable

 

                    Rachel Bailey MD   Admitting Clinician [phone]

 

                    AN CHAMBERS      Admitting Clinician Unavailable

 

                    Trish MEADOWS, An [place]   Admitting Clinician [phone]

 

                    KAYLYNN LU      Admitting Clinician Unavailable

 

                    Tabitha MEADOWS, Kaylynn   Admitting Clinician [phone]

 

                    CHRIS SOL     Admitting Clinician Unavailable

 

                    TREY MEADOWS    Admitting Clinician Unavailable

 

                    Abdiel MEADOWS, Trey Admitting Clinician [phone]

 

                    TOBIAS HERNANDEZ     Admitting Clinician Unavailable

 

                    Tobias Hernandez MD  Admitting Clinician [phone]

 

                    RAND DONG      Admitting Clinician Unavailable

 

                    Rand Dong MD   Admitting Clinician [phone]

 

                    Deisy Sutton Admitting Clinician [phone]

 

                    DEISY SUTTON Admitting Clinician Unavailable

 

                    Bernardo De Souza Admitting Clinician [phone]

 

                    ALLISON ROMEO     Admitting Clinician Unavailable

 

                    ALMA [place]ASEÑOR    Admitting Clinician Unavailable

 

                    NEETA MARIA    Admitting Clinician Unavailable

 

                    NURIA PEREIRA        Admitting Clinician Unavailable

 

                    Peter De Leon Admitting Clinician [phone]

 

                    Deedee Reinoso   Admitting Clinician [phone]

 

                    RAMU TORRES Admitting Clinician Unavailable









Payers







           Payer Name Policy Type Policy Number Effective Date Expiration Date S

cameron

 

           [facility] STAR            [id number]  2021            



           PLUS                             00:00:00              

 

           [employer]            [id number]  2022            



                                            00:00:00              







Problems







       Condition Condition Condition Status Onset  Resolution Last   Treating Co

mments 

Source



       Name   Details Category        Date   Date   Treatment Clinician        



                                                 Date                 

 

       Lactic Lactic Disease Active 2022                             [facility]



       acidosis acidosis               1-06                               ity of



                                   00:00:                             [place]



                                   00                                 Medical



                                                                      Branch

 

       Nausea and Nausea and Disease Active 2022                             U

nivers



       vomiting, vomiting,               0-21                               ity 

of



       unspecifie unspecifie               00:00:                             [facility]

[place]



       d vomiting d vomiting               00                                 Me

dical



       type   type                                                    Branch

 

       Chest pain Chest pain Disease Active                              U

nivers



                                   8-04                               ity of



                                   00:00:                             [place]



                                   00                                 Medical



                                                                      Branch

 

       Foot ulcer Foot ulcer Disease Active                       Overview

: Univers



       with fat with fat                                       Formattin ity

 of



       layer  layer                00:00:                      g of this [place]



       exposed, exposed,               00                          note   Medica

l



       left   left                                             might be Branch



                                                               different 



                                                               from the 



                                                               original. 



                                                               Added  



                                                               automatic 



                                                               ally from 



                                                               request 



                                                               for    



                                                               surgery 



                                                               [id number] 

 

       Right foot Right foot Disease Active                       Overview

: Univers



       ulcer, ulcer,                                       Formattin ity of



       with fat with fat               00:00:                      g of this Eric

as



       layer  layer                00                          note   Medical



       exposed exposed                                           might be Branch



                                                               different 



                                                               from the 



                                                               original. 



                                                               Added  



                                                               automatic 



                                                               ally from 



                                                               request 



                                                               for    



                                                               surgery 



                                                               [id number] 

 

       Diabetic Diabetic Disease Active                              Unive

rs



       ketoacidos ketoacidos                                              it

y of



       is without is without               00:00:                             Te

[place]



       coma   coma                 00                                 Medical



       associated associated                                                  Br

anch



       with type with type                                                  



       1 diabetes 1 diabetes                                                  



       mellitus mellitus                                                  

 

       Abdominal Abdominal Disease Active                              Uni

vers



       pain,  pain,                6-24                               ity of



       unspecifie unspecifie               00:00:                             Te

[place]



       d      d                    00                                 Medical



       abdominal abdominal                                                  Bran

ch



       location location                                                  

 

       Hyperglyce Hyperglyce Disease Active                              U

jeferson



       jarvis    jarvis                                                 ity of



                                   00:00:                             [place]



                                                                    Medical



                                                                      Branch

 

       Decubitus Decubitus Disease Active                              Uni

vers



       ulcer of ulcer of               -05                               ity of



       heel,  heel,                00:00:                             [place]



       left,  left,                00                                 Medical



       unstageabl unstageabl                                                  Br

anch



       e      e                                                       

 

       Diabetic Diabetic Disease Active                              Unive

rs



       ketoacidos ketoacidos               6-05                               it

y of



       is without is without               00:00:                             Te

[place]



       coma   coma                 00                                 Medical



       associated associated                                                  Br

anch



       with type with type                                                  



       2 diabetes 2 diabetes                                                  



       mellitus mellitus                                                  

 

       Decubitus Decubitus Disease Active                              Uni

vers



       ulcer of ulcer of               6-05                               ity of



       heel,  heel,                00:00:                             [place]



       left,  left,                00                                 Medical



       unstageabl unstageabl                                                  Br

anch



       e      e                                                       

 

       Pyuria Pyuria Disease Active                              Univers



                                   6                               ity of



                                   00:00:                             [place]



                                   00                                 Medical



                                                                      Branch

 

       Chronic Chronic Disease Active                              Univers



       suprapubic suprapubic                                              it

y of



       catheter catheter               00:00:                             [place]



                                   00                                 Medical



                                                                      Branch

 

       Uncontroll Uncontroll Disease Active                              U

jeferson



       ed     ed                                                  ity of



       diabetes diabetes               00:00:                             [place]



       mellitus mellitus               00                                 Medica

l



       with   with                                                    Branch



       complicati complicati                                                  



       ons    ons                                                     

 

       Spina  Spina  Disease Recurre                              Univers



       bifida bifida        nce                                   ity of



                                   00:00:                             [place]



                                   00                                 Medical



                                                                      Branch

 

       Wound  Wound  Disease Active                              Univers



       infection infection               5-11                               ity 

of



                                   00:00:                             [address]



                                                                      Branch

 

       Chills Chills Disease Active                              Univers



                                   5-11                               ity of



                                   00:00:                             [address]

 

       POSSIBLE   POSSIBLE Diagnosis Active 2022            

   [facility]



        SHUNT  SHUNT               4-          21:48:00               l



       MALFUNCTIO MALFUNCTIO               00:00:                             Edson milan



       N      N Active               00                                 



              2022                                                  



               [facility]                                                  

 

        SHUNT   SHUNT Diagnosis Active 2022             

  [facility]



       MALFUNCTIO MALFUNCTIO               3-          14:12:00               

l



       N      N Active               00:00:                             Jose



              2022                                 



              [facility]                                                  

 

        SHUNT   SHUNT Diagnosis Active 2022             

  [facility]



       MALFUCTION MALFUCTION               3-24          23:59:00               

l



              Active               00:00:                             Jose



              2022                                 



              [facility]                                                  

 

       Complicate Complicate Disease Active                              U

nivers



       d urinary d urinary               1-20                               ity 

of



       tract  tract                00:00:                             [place]



       infection infection               00                                 [facility]

 

       Hyperglyce Hyperglyce Disease Active                              C

[facility]



       jarvis    jarvis                  [address]                               [facility]



       without without               00:00:                             Medical



       ketosis ketosis               00                                 [place]

 

       HEADACHE  HEADACHE Diagnosis Active 2018             

  [facility]



              Active                         22:05:00               l



              2018               00:00:                             Miguel malloy



              [address]                                                  

 

       SHUNT   SHUNT Diagnosis Active 2018               Mem

oria



       MALFUNCTIO MALFUNCTIO               -          20:00:00               

l



       N      N Active               00:00:                             Jose



              2018               00                                 



               [facility]                                                  

 

       ACUTE   ACUTE Diagnosis Active 2018               Mem

oria



       HEADACHE HEADACHE               -          09:20:00               l



              Active               00:00:                             Jose



              2018                                 



               [facility]                                                  

 

       Spina  Spina  Disease Active                              [facility]



       bifida bifida               6-                               [facility]



                                   00:00:                             [address]

 

       Pyelonephr Pyelonephr Disease Active                              C

[facility]



       itis   itis                 6-                               [facility]



                                   00:00:                             Medical



                                   [address] [place] [address]

 

       Morbid Morbid Disease Active                              [facility]



       obesity obesity               1-04                               ity of



       with body with body               00:00:                             [place]

[place]



       mass index mass index               00                                 Me

dical



       of     of                                                      Branch



       40.0-49.9 40.0-49.9                                                  

 

       Spina   Spina Problem                      2019               [facility]

ia



       bifida, bifida,                             14:14:02               l



       unspecifie unspecifie                                                  He

rmann



       d      d                                                       



              2019                                                  



              [facility]                                                  

 

       Nausea  Nausea Problem                      2019               [facility]

rajeev



       with   with                               14:14:02               l



       vomiting, vomiting,                                                  Herm

jorge



       unspecifie unspecifie                                                  



       d      d                                                       



              2019                                                  



              [facility]                                                  

 

       Diplopia   Diplopia Problem                      2019              

 [facility]



              2019                             14:14:02               l



              [facility]                                                  

 

       Cerebral  Cerebral Problem                      2019               

[facility]



       palsy, palsy,                             14:14:02               l



       unspecifie unspecifie                                                  He

rmann



       d      d                                                       



              2019                                                  



              [facility]                                                  

 

       Acquired  Acquired Problem                      2019               

[facility]



       absence of absence of                             14:14:02               

l



       other  other                                                   Jose



       specified specified                                                  



       parts of parts of                                                  



       digestive digestive                                                  



       tract  tract                                                   



              2019                                                  



              [facility]                                                  

 

       Nicotine  Nicotine Problem                      2019               

[facility]



       dependence dependence                             14:14:02               

l



       ,      ,                                                       [place]



       cigarettes cigarettes                                                  



       ,      ,                                                       



       uncomplica uncomplica                                                  



       huma    huma                                                     



              2019                                                  



              [facility]                                                  

 

       Presence  Presence Problem                      2019               

[facility]



       of     of                                 14:14:02               l



       cerebrospi cerebrospi                                                  He

rmann



       nal fluid nal fluid                                                  



       drainage drainage                                                  



       device device                                                  



              2019                                                  



               [facility]                                                  

 

       Allergy  Allergy Problem                      2019               Me

moria



       status to status to                             14:14:02               l



       other  other                                                   [place]



       antibiotic antibiotic                                                  



       agents agents                                                  



       status status                                                  



              2019                                                  



               [facility]                                                  

 

       Allergy  Allergy Problem                      2019               Me

moria



       status to status to                             14:14:02               l



       other  other                                                   [place]



       drugs, drugs,                                                  



       medicament medicament                                                  



       s and  s and                                                   



       biological biological                                                  



       substances substances                                                  



       status status                                                  



              2019                                                  



               [facility]                                                  

 

       Allergy  Allergy Problem                      2019               Me

moria



       status to status to                             14:14:02               l



       narcotic narcotic                                                  Miguel

n



       agent  agent                                                   



       status status                                                  



              2019                                                  



              [facility]                                                  

 

       Asthma  Asthma Problem Resolve               2022-04-15               Mem

oria



       (disorder) (disorder)        d                    23:48:47               

l



              Resolved                                                  [place]



              Problem                                                  



              04/15/2022                                                  



              [facility]                                                  

 

       Bronchitis  Bronchiti Problem Resolve               2022-04-15           

    [facility]



       (disorder) s             d                    23:48:47               l



              (disorder)                                                  Miguel

n



              Resolved                                                  



              Problem                                                  



              04/15/2022                                                  



              [facility]                                                  

 

       Cerebral  Cerebral Problem Resolve               2022-04-15              

 [facility]



       palsy  palsy         d                    23:48:47               l



       (disorder) (disorder)                                                  He

rmann



              Resolved                                                  



              Problem                                                  



              04/15/2022                                                  



               [facility]                                                  

 

       Hydrocepha  Hydroceph Problem Resolve               2022-04-15           

    [facility]



       ozzy    alus          d                    23:48:47               l



       (disorder) (disorder)                                                  He

rmann



              Resolved                                                  



              Problem                                                  



              04/15/2022                                                  



              [facility]                                                  

 

       Osteomyeli  Osteomyel Problem Resolve               2022-04-15           

    [facility]



       tis    itis          d                    23:48:47               l



       (disorder) (disorder)                                                  He

rmann



               Resolved                                                  



              Problem                                                  



              04/15/2022                                                  



              [facility]                                                  

 

       Acute pain  Acute Problem Active               2022-04-15               M

emoria



       (finding) pain                               23:48:47               l



              (finding)                                                  Jose



              Active                                                  



              Problem                                                  



              04/15/2022                                                  



              [facility]                                                  

 

       Headache  Headache Problem Active               2022-04-15               

[facility]



       (finding) (finding)                             23:48:47               l



              Active                                                  [place]



              Problem                                                  



              04/15/2022                                                  



              [facility]                                                  

 

       Morbid   Morbid Problem Active               2022-04-15               Mem

oria



       obesity obesity                             23:48:47               l



       (disorder) (disorder)                                                  He

rmann



              Active                                                  



              Problem                                                  



              04/15/2022                                                  



               [facility]                                                  

 

       Providenci  Providenc Problem Active               2022-04-15            

   [facility]



       a      ia                                 23:48:47               l



       (organism) (organism)                                                  He

rmann



               Active                                                  



              Problem                                                  



              04/15/2022                                                  



              Problem                                                  



              added by                                                  



              Discern                                                  



              Expert. [facility]                                                  







History of Past Illness







       Condition Condition Condition Status Onset  Resolution Last   Treating Co

mments 

Source



       Name   Details Category        Date   Date   Treatment Clinician        



                                                 Date                 

 

       Headache  Headache Problem        2019           

    [facility]



              2018-   14:14:02 14:14:02               l



              2019               06:00:                             Miguel malloy



              [address]                                                  







Allergies, Adverse Reactions, Alerts







       Allergy Allergy Status Severity Reaction(s) Onset  Inactive Treating Comm

ents 

Source



       Name   Type                        Date   Date   Clinician        

 

       Amoxicil Propensi Active        Hives                        Univer

s



       bryn    ty to                       7-27                        ity of



              adverse                      00:00:                      [place]



              reaction                      00                          Medical



              s                                                       Branch

 

       AMOXICIL DRUG   Active        Hives  2018-0                      [facility]



       BRYN    INGREDI                      7-27                        ity of



                                          00:00:                      [place]



                                          00                          Medical



                                                                      Branch

 

       Metoclop Propensi Active        Nausea 2017-1                      Univer

s



       ramide ty to                and/or 2-20                        ity of



       Hcl    adverse               Vomiting 00:00:                      [place]



              reaction                      00                          Medical



              s                                                       Branch

 

       METOCLOP DRUG   Active        N/V    2017-                      [facility]



       RAMIDE INGREDI                      2-20                        ity of



       HCL                                00:00:                      [place]



                                          00                          Medical



                                                                      Branch

 

       Sulfamet Propensi Active        Hives  2017-0                      [facility]



       hoxazole ty to                       6-11                        [facility]



       [facility] adverse                      00:00:                      [facility]



       oprim  reaction                      00                          Center



              s                                                       

 

       Levoflox Propensi Active        Hives  2017-0                      [facility]



       acin   ty to                       6-11                        [facility]



              adverse                      00:00:                      [facility]



              reaction                      00                          Center



              s                                                       

 

       Morphine Propensi Active        Hives  2017-0                      [facility]



              ty to                       6-11                        [facility]



              adverse                      00:00:                      [facility]



              reaction                      00                          Center



              s                                                       

 

       Sesame Propensi Active        Hives  2017-0                      [facility]



       Seed   ty to                       6-11                        [facility]



              adverse                      00:00:                      [facility]



              reaction                      00                          Center



              s                                                       

 

       Ketorola Propensi Active        Rash   2017-0                      [facility]



       c      ty to                       6-11                        [facility]



              adverse                      00:00:                      Medical



              reaction                      00                          Center



              s                                                       

 

       Vancomyc Propensi Active        Rash   2017-0                      [facility]



       in     ty to                       6-11                        [facility]



       [employer]ogue adverse                      00:00:                      Medical



       s      reaction                      00                          Center



              s                                                       

 

       Ondanset Propensi Active        Nausea And 2017-0                      [facility]

[facility]



       evin Hcl ty to                Vomiting 6-11                        [facility]



       (Pf)   adverse                      00:00:                      Medical



              reaction                      00                          Center



              s                                                       

 

       SULFAMET Allergy Active High   Hives  2017-0                      [facility]



       HOXAZOLE                             6-11                        [facility]



       [facility]                             00:00:                      Medical



       OPRIM                              00                          Center

 

       LEVOFLOX Allergy Active High   Hives  2017-0                      [facility]



       ACIN                               6-11                        [facility]



                                          00:00:                      Medical



                                          00                          Center

 

       MORPHINE Allergy Active High   Hives  2017-0                      [facility]



                                          6-11                        [facility]



                                          00:00:                      Medical



                                          00                          Center

 

       KETOROLA Allergy Active High   Rash   2017-0                      [facility]



       C                                  6-11                        [facility]



                                          00:00:                      Medical



                                          00                          Center

 

       VANCOMYC Allergy Active High   Rash   2017-0                      [facility]



       IN                                 6-11                        [facility]



       ANALOGUE                             00:00:                      Medical



       S                                  00                          Center

 

       SESAME Allergy Active        Hives  2017-0                      [facility]



       SEED                               6-11                        [facility]



                                          00:00:                      Medical



                                          00                          Center

 

       ONDANSET Allergy Active        N\T\V                        [facility]



       EVIN HCL                             6-11                        [facility]



       (PF)                               00:00:                      Medical



                                          00                          Center

 

       Sulfa  Propensi Active        Other - See                       Uni

vers



       (Sulfona ty to                comments 1-04                        ity of



       mide   adverse                      00:00:                      [place]



       Antibiot reaction                      00                          Medica

l



       ics)   s                                                       Branch

 

       Sulfamet Propensi Active        Other - See                       U

nivers



       hoprim ty to                comments                         ity of



       Ds     adverse                      00:00:                      [place]



              reaction                      00                          Medical



              s                                                       Branch

 

       Vancomyc Propensi Active        Other - See                       U

nivers



       in     ty to                comments                         ity of



              adverse                      00:00:                      [place]



              reaction                      00                          Medical



              s                                                       Branch

 

       Sulfa  Propensi Active        Other - See                       Uni

vers



       (Sulfona ty to                comments                         ity of



       mide   adverse                      00:00:                      [place]



       Antibiot reaction                      00                          Medica

l



       ics)   s                                                       Branch

 

       SULFA  Drug   Active        Other-Cmnt                       Univer

s



       (SULFONA Class                       1-04                        ity of



       MIDE                               00:00:                      [place]



       ANTIBIOT                             00                          Medical



       ICS)                                                           Branch

 

       SULFAMET DRUG   Active        Other-Cmnt                       Univ

ers



       HOPRIM                             1-04                        ity of



       DS                                 00:00:                      [place]



                                                                    Medical



                                                                      Branch

 

       VANCOMYC DRUG   Active        Other-Cmnt                       Univ

ers



       IN     INGREDI                      1-04                        ity of



                                          00:00:                      [place]



                                          00                          Medical



                                                                      Branch

 

       Sulfamet Propensi Active        Rash                         Univer

s



       hoxazole ty to                       7-19                        ity of



              adverse                      00:00:                      [place]



              reaction                      00                          Medical



              s                                                       Branch

 

       Ondanset Propensi Active        Nausea                       Univer

s



       evin Hcl ty to                and/or 719                        ity of



       (Pf)   adverse               Vomiting 00:00:                      [place]



              reaction                      00                          Medical



              s                                                       Branch

 

       SULFAMET DRUG   Active        Rash                         [facility]



       HOXAZOLE INGREDI                      7-19                        ity of



                                          00:00:                      [place]



                                                                    Medical



                                                                      Branch

 

       ONDANSET DRUG   Active        N/V                          [facility]



       EVIN HCL                             7-19                        ity of



       (PF)                               00:00:                      [place]



                                          00                          Medical



                                                                      Branch

 

       Levoflox Propensi Active        Hives                        Univer

s



       acin   ty to                       5-23                        ity of



              adverse                      00:00:                      [place]



              reaction                      00                          Medical



              s                                                       Branch

 

       Morphine Propensi Active        Hives                        Univer

s



              ty to                       5-23                        ity of



              adverse                      00:00:                      [place]



              reaction                      00                          Medical



              s                                                       Branch

 

       Ketorola Propensi Active        Nausea                       Univer

s



       c      ty to                and/or                         ity of



       Trometha adverse               Vomiting 00:00:                      [place]



       mine   reaction                      00                          Medical



              s                                                       Branch

 

       LEVOFLOX DRUG   Active        Hives                        Univers



       ACIN   INGREDI                                              ity of



                                          00:00:                      [place]



                                          00                          Medical



                                                                      Branch

 

       MORPHINE DRUG   Active        Hives                        Univers



              INGREDI                                              ity of



                                          00:00:                      [place]



                                          00                          Medical



                                                                      Branch

 

       KETOROLA DRUG   Active        N/V                          Univers



       C      INGREDI                                              ity of



       TROMETHA                             00:00:                      [place]



       MINE                               00                          Medical



                                                                      Branch

 

       amoxicil amoxicil Active                                           [facility]

a



       bryn    bryn                                                     l



                                                                      [place]

 

       morphine morphine Active                                           [facility]

a



                                                                      l



                                                                      [place]

 

       Toradol Toradol Active                                           [facility]



                                                                      l



                                                                      [place]

 

       Minocin Minocin Active                                           [facility]



                                                                      l



                                                                      [place]

 

       Zofran Zofran Active                                           [facility]



                                                                      l



                                                                      [place]

 

       Levaquin Levaquin Active                                           [facility]

a



                                                                      l



                                                                      [place]

 

       Bactrim Bactrim Active                                           [facility]



                                                                      l



                                                                      [place]

 

       Reglan Reglan Active                                           [facility]



                                                                      l



                                                                      [place]







Family History







           Family Member Diagnosis  Comments   Start Date Stop Date  Source

 

           Natural father Diabetes                                    [facility]

 [facility] [place]



                                                                  [facility] [place]

 

           Natural mother No Significant                                  [facility]iver

[facility]ity [facility] [place]



                      Medical Problems                                  Medical 

Branch







Social History







           Social Habit Start Date Stop Date  Quantity   Comments   Source

 

           History [facility]                                             University o

f



           Alcohol Frequency                                             [facility]

edical



                                                                  Branch

 

           History [facility]                                             University o

f



           Alcohol Std Drinks                                             [place] 

Medical



                                                                  Branch

 

           History [facility] o

f



           Alcohol Binge                                             [place] [facility]

al



                                                                  [place]

 

           History of tobacco                       Cigarette Smoker            

[facility]

 

           Exposure to 2022 Not sure              University of



           SARS-CoV-2 (event) 00:00:00   12:04:00                         [facility]

 

           Alcohol intake 2022 Current drinker            Unive

rsity of



                      00:00:00   00:00:00   of alcohol            [place] [facility]



                                            (finding)             Branch

 

           Education  2022 21                    [facility] of



                      00:00:00   00:00:00                         [facility]

 

           Tobacco use and 2022-10-21 2022-10-21 Smokeless             Universit

y of



           exposure   00:00:00   00:00:00   tobacco non-user            [place] [facility]

dical



                                                                  [place]

 

           Alcohol Comment 2022 once a year            Universi

ty of



                      00:00:00   00:00:00                         [facility]

 

           [facility]



                      05:57:00   05:57:00                         

 

           Cigarettes smoked 2017                       FRANCINE Dubois



           current (pack per 00:00:00   00:00:00                         Medical

 Center



           day) - Reported                                             

 

           Sex Assigned At 1985                       FRANCINE Rivass



           [facility]      00:00[facility]   [facility]:00                         [facility]









                Smoking Status  Start Date      Stop Date       Source

 

                Social History  2018 11:26:10                 [facility]

[facility]







Medications







       Ordered Filled Start  Stop   Current Ordering Indication Dosage Frequency

 Signature

                    Comments            Components          Source



     Medication Medication Date Date Medication? Clinician                (SIG) 

          



     Name Name                                                   

 

     Nitrofurant      2022- Yes            100mg      100 mg,           U

nivers



     oin&Nit.                                Oral, BID,           ity 

of



     Macrocryst      02:00: 01:59                          10 doses,           T

[place]



     (MACROBID)      00   :00                           First dose           Med

ical



     100 mg                                         on 



     capsule 100                                         22 at           



     mg                                           2000, Last           



                                                  dose on           



                                                  Sat            



                                                  22           



                                                  at 0800,           



                                                  Routine<br           



                                                  >Reason           



                                                  for            



                                                  Anti-Infec           



                                                  tive:           



                                                  Empiric           



                                                  Therapy           



                                                  for            



                                                  Suspected           



                                                  Infection<           



                                                  br>Empiric           



                                                  Therapy           



                                                  Site:           



                                                  Urine<br>D           



                                                  uration of           



                                                  therapy:           



                                                  72 hours           

 

     ceFEPIme      2022- No             1000mg      1,000 mg,           U

nivers



     (MAXIPIME)                                IV             ity of



     1,000 mg in      21:15: 21:34                          [place], [place]



     NaCl 0.9%      00   :48                           ONCE, 1           Medical



     (NS) 50 mL                                         dose, On           [facility]

h



     MINI-BAG                                         22 at           



                                                  1515,           



                                                  Administer           



                                                  over 30           



                                                  Minutes,           



                                                  50             



                                                  mL<br>Reas           



                                                  on for           



                                                  Anti-Infec           



                                                  tive:           



                                                  Documented           



                                                  Infection<           



                                                  br>Documen           



                                                  huma            



                                                  Infection           



                                                  Site:           



                                                  Urine<br&g           



                                                  t;Duration           



                                                  of             



                                                  Therapy: 7           



                                                  days           

 

     HYDROmorpho      2022      Yes            4mg       Take 4 mg           U

nivers



     ne 4 mg      1-07                               by mouth 3           ity of



     tablet      17:55:                               (three)           [place]



               40                                 times           Medical



                                                  daily as           Branch



                                                  needed.           

 

     HYDROmorpho      2022      Yes            4mg       Take 4 mg           U

nivers



     ne 4 mg      1-07                               by mouth 3           ity of



     tablet      17:55:                               (three)           [place]



               40                                 times           Medical



                                                  daily as           Branch



                                                  needed.           

 

     enoxaparin      2022      Yes            40mg      40 mg,           Unive

rs



     (LOVENOX)                                     Subcutaneo           ity 

of



     injection      15:00:                               us, DAILY,           Te

[place]



     40 mg      00                                 First dose           Medical



                                                  on Mon           Branch



                                                  22 at           



                                                  0900,           



                                                  Until           



                                                  Discontinu           



                                                  ed,            



                                                  Routine           

 

     Sliding      2022      Yes                      Subcutaneo           Univ

ers



     Scale      -07                               us, TID           ity of



     Insulin -      03:00:                               MEALS+HS,           Eric

as



     Lispro      00                                 First dose           Medical



     (HumaLOG) +                                         on [facility]



     Fsbg                                         22 at           



     Testing                                         2100,           



                                                  Until           



                                                  Discontinu           



                                                  ed,            



                                                  Routine           

 

     FENTanyl PF      2022- No             75ug      75 mcg,           Un

yoselyn



     (SUBLIMAZE                                Slow IV           ity o

f



     (PF))      01:15: 00:23                          Push,           [place]



     injection      00   :00                           ONCE, 1           Medical



     75 mcg                                         dose, On           [facility]            



                                                  22 at           



                                                  1915,           



                                                  Routine           

 

     dextrose      2022      Yes            250mL      250 mL, IV           Un

yoselyn



     10% (D10W)                                     Infusion,           ity 

of



     bolus      01:12:                               PRN - SEE           [place]



     infusion      23                                 INSTRUCTIO           Medic

al



     250 mL                                         NS,            Branch



                                                  Administer           



                                                  over 60           



                                                  Minutes,           



                                                  Other, If           



                                                  blood           



                                                  glucose is           



                                                  &amp;lt;           



                                                  or = 70           



                                                  mg/dL and           



                                                  patient is           



                                                  unable to           



                                                  swallow or           



                                                  has mental           



                                                  status           



                                                  changes,           



                                                  Starting           



                                                  on Sun           



                                                  22 at           



                                                  1912<br>If           



                                                  blood           



                                                  glucose is           



                                                  < or = 70           



                                                  mg/dL and           



                                                  patient is           



                                                  unable to           



                                                  swallow or           



                                                  has mental           



                                                  status           



                                                  changes           



                                                  (Give           



                                                  glucagon           



                                                  order if           



                                                  patient           



                                                  needs           



                                                  fluid           



                                                  restrictio           



                                                  n):            



                                                  &nbsp;IF           



                                                  IV access           



                                                  available:           



                                                  Dextrose           



                                                  10%.           



                                                  &nbsp;1.           



                                                  125 mL (?           



                                                  bag) of           



                                                  D10W IV           



                                                  infusion -           



                                                  equivalent           



                                                  to 12.5 g           



                                                  dextrose           



                                                  &nbsp;2.           



                                                  Blood           



                                                  glucose -           



                                                  draw blood           



                                                  glucose 15           



                                                  minutes           



                                                  after D10W           



                                                  Administra           



                                                  tion.           



                                                  &amp;nbsp;           



                                                  3. If           



                                                  blood           



                                                  glucose is           



                                                  < 80           



                                                  mg/dL,           



                                                  repeat.<br           



                                                  >              

 

     glucagon      2022      Yes            1mg       1 mg,           Univers



     (GLUCAGEN                                     Intramuscu           ity 

of



     DIAGNOSTIC      01:12:                               lar, PRN,           Te

xas



     KIT)      20                                 Starting           Medical



     injection 1                                         on [facility]



     mg                                           22 at           



                                                  1912,           



                                                  Until           



                                                  Discontinu           



                                                  ed, ASAP,           



                                                  Blood           



                                                  Glucose           



                                                  &amp;lt;           



                                                  or = 70           



                                                  mg/dL and           



                                                  patient is           



                                                  unable to           



                                                  swallow or           



                                                  has mental           



                                                  changes.           

 

     HYDROmorpho      2022      Yes            4mg       4 mg,           Unive

rs



     ne        07                               Oral,           ity of



     (DILAUDID)      01:08:                               Q6HPRN,           [place]

[place]



     tablet 4 mg      42                                 Starting           Medi

sarah



                                                  on Sun           Branch



                                                  22 at           



                                                  1908,           



                                                  Until           



                                                  Discontinu           



                                                  ed,            



                                                  Routine,           



                                                  Pain           



                                                  (scale           



                                                  7-10)           

 

     proMETHazin      2022      Yes            12.5mg      12.5 mg,           

Univers



     e                                        Oral,           ity of



     (PHENERGAN)      01:06:                               Q4HPRN,           Eric

as



     tablet 12.5      57                                 Starting           Medi

sarah



     mg                                           on Sun           Branch



                                                  22 at           



                                                  1906,           



                                                  Until           



                                                  Discontinu           



                                                  ed,            



                                                  Routine,           



                                                  Nausea and           



                                                  Vomiting           



                                                  (N/V)           

 

     acetaminoph      2022      Yes            650mg      650 mg,           Un

y[place]elyn



     en                                       Oral,           ity of



     (TYLENOL)      01:05:                               Q6HPRN,           [place]



     tablet 650      57                                 Starting           Medic

al



     mg                                           on Sun           Branch



                                                  22 at           



                                                  1905,           



                                                  Until           



                                                  Discontinu           



                                                  ed,            



                                                  Routine,           



                                                  Pain           



                                                  (scale           



                                                  1-3)           

 

     NaCl 0.9%      2022- No             1000mL      at 999           Uni

vers



     (NS) bolus      07                          mL/hr,           ity of



     infusion      00:30: 00:35                          1,000 mL,           Eric

as



     1,000 mL      00   :00                           IV             Medical



                                                  Infusion,           [facility]



                                                  ONCE, 1           



                                                  dose, On           



                                                  Sun            



                                                  22 at           



                                                  1830, STAT           

 

     Nitrofurant      2022- Yes       [id number] 100mg      Take 1         

  [facility]



     oin&Nit.      -15                          capsule by           ity 

of



     Macrocryst      00:00: 05:59                          mouth in           [facility]

[place]



     100 mg      00   :00                           the            Medical



     capsule                                         morning           [facility]



                                                  and 1           



                                                  capsule in           



                                                  the            



                                                  evening.           



                                                  Do all           



                                                  this for 7           



                                                  days.           

 

     NaCl 0.9%      2022- No             1000mL      at 999           Uni

vers



     (NS) bolus      -07                          mL/hr,           ity of



     infusion      23:45: 00:36                          1,000 mL,           Eric

as



     1,000 mL      00   :00                           IV             Medical



                                                  Infusion,           [facility]



                                                  ONCE, 1           



                                                  dose, On           



                                                  Sun            



                                                  22 at           



                                                  1745, ASAP           

 

     FENTanyl PF      2022- No             75ug      75 mcg,           Un

[facility]



     (SUBLIMAZE      106 11-06                          Slow IV           ity o

f



     (PF))      23:45: 23:19                          Push,           [place]



     injection      00   :00                           ONCE, 1           Medical



     75 mcg                                         dose, On           [facility]



                                                  Sun            



                                                  22 at           



                                                  1745,           



                                                  Routine           

 

     iopamidol      2022- No        [id number] 85mL      85 mL,           

[facility]



     (ISOVUE      0-30 10-30                          Intravenou           ity o

f



     370-500 mL)      03:00: 02:09                          s, ONCE, 1          

 [place]



     injection      00   :00                           dose, On           Medica

l



     85 mL                                         Sat            [facility]



                                                  10/29/22           



                                                  at 2200,           



                                                  Routine           

 

     FENTanyl PF      2022- No             50ug      50 mcg,           Un

[facility]



     (SUBLIMAZE      0-30 10-30                          Slow IV           ity o

f



     (PF))      02:45: 01:47                          Push,           [place]



     injection      00   :00                           ONCE, 1           Medical



     50 mcg                                         dose, On           [facility]



                                                  Sat            



                                                  10/29/22           



                                                  at 2145,           



                                                  Routine           

 

     cefdinir      2022- No             300mg      300 mg,           Univ

ers



     (OMNICEF)      0-30 10-30                          Oral,           ity of



     capsule 300      02:15: 03:14                          ONCE, 1           [facility]

[facility]



     mg        00   :00                           dose, On           Medical



                                                  Sat            [facility]



                                                  10/29/22           



                                                  at 2115,           



                                                  ASAP<br>Re           



                                                  ason for           



                                                  Anti-Infec           



                                                  tive:           



                                                  Documented           



                                                  Infection<           



                                                  br>Documen           



                                                  huma            



                                                  Infection           



                                                  Site:           



                                                  Urine<br>D           



                                                  uration of           



                                                  Therapy:           



                                                  Other (see           



                                                  Comments)           

 

     proMETHazin      2022- No             25mg      25 mg, IV           

[facility]



     e         0-30 10-30                          Piggyback,           ity of



     (PHENERGAN)      01:45: 01:47                          ONCE, 1           Te

[facility]



     25 mg in      00   :00                           dose, On           Medical



     NaCl 0.9%                                         Sat            [facility]



     (NS) 50 mL                                         10/29/22           



     IV                                           at 2045,           



     piggyback                                         ASAP           

 

     cefdinir      2022      Yes       [id number] 300mg      Take 1           Un

[facility]



     300 mg      0-29                               capsule by           ity of



     capsule      00:00:                               mouth           [place]



               00                                 every 12           Medical



                                                  (twelve)           [facility]



                                                  hours.           

 

     cefdinir      2022      Yes       [id number] 300mg      Take 1           Un

[facility]



     300 mg      0-29                               capsule by           ity of



     capsule      00:00:                               mouth           [place]



               00                                 every 12           Medical



                                                  (twelve)           [facility]



                                                  hours.           

 

     cefdinir      2022- No        [id number] 300mg      Take 1           U

nivers



     300 mg      0-29 -                          capsule by           ity of



     capsule      00:00: 00:00                          mouth           [place]



               00   :00                           every 12           Medical



                                                  (twelve)           [facility]



                                                  hours.           

 

     HYDROmorpho      2022      Yes            4mg       Take 4 mg           U

nivers



     ne 4 mg      0-23                               by mouth 3           ity of



     tablet      12:52:                               (three)           [place]



               28                                 times           Medical



                                                  daily as           [facility]



                                                  needed.           

 

     HYDROmorpho      -1      Yes            4mg       Take 4 mg           U

nivers



     ne 4 mg      0-23                               by mouth 3           ity of



     tablet      12:52:                               (three)           [place]



               28                                 times           Medical



                                                  daily as           [facility]



                                                  needed.           

 

     HYDROmorpho      -1      Yes            4mg       Take 4 mg           U

nivers



     ne 4 mg      0-23                               by mouth 3           ity of



     tablet      12:52:                               (three)           [place]



               28                                 times           Medical



                                                  daily as           [facility]



                                                  needed.           

 

     HYDROmorpho      2022      Yes            4mg       Take 4 mg           U

nivers



     ne 4 mg      0-23                               by mouth 3           ity of



     tablet      12:52:                               (three)           [place]



               28                                 times           Medical



                                                  daily as           [facility]



                                                  needed.           

 

     HYDROmorpho      2022- No             .5mg      0.5 mg,           Un

[facility]



     ne        0-23 10-                          Slow IV           ity of



     (DILAUDID)      03:15: 03:23                          Push,           [place]



     injection      00   :00                           ONCE, 1           Medical



     0.5 mg                                         dose, On           [facility]



                                                  Sat            



                                                  10/22/22           



                                                  at 2215,           



                                                  Routine<br           



                                                  >Use           



                                                  approved           



                                                  by             



                                                  (Faculty):           



                                                  [facility]            



                                                  PROVIDER           

 

     HYDROmorpho      2022- No             .5mg      0.5 mg,           Un

[facility]



     ne        0-22 10-                          Slow IV           ity of



     (DILAUDID)      02:00: 02:00                          Push,           [place]



     injection      00   :00                           ONCE, 1           Medical



     0.5 mg                                         dose, On           [facility]



                                                  Fri            



                                                  10/21/22           



                                                  at 2100,           



                                                  Routine<br           



                                                  >Use           



                                                  approved           



                                                  by             



                                                  (Faculty):           



                                                  [facility]            



                                                  PROVIDER           

 

     doxycycline      2022- No             100mg      100 mg, IV         

  [facility]



     (VIBRAMYCIN      0-21 10-                          Piggyback,           i

ty of



     ) 100 mg in      23:30: 20:05                          Q12H ABX,           

[place]



     NaCl 0.9%      00   :47                           10 doses,           Medic

al



     (NS) 100 mL                                         First dose           Br

anch



     MINI-BAG                                         on Fri           



                                                  10/21/22           



                                                  at 1830,           



                                                  Last dose           



                                                  on Wed           



                                                  10/26/22           



                                                  at 0630,           



                                                  Administer           



                                                  over 60           



                                                  Minutes,           



                                                  100            



                                                  mL<br>Reas           



                                                  on for           



                                                  Anti-Infec           



                                                  tive:           



                                                  Empiric           



                                                  Therapy           



                                                  for            



                                                  Suspected           



                                                  Infection<           



                                                  br>Empiric           



                                                  Therapy           



                                                  Site: Skin           



                                                  / Soft           



                                                  tissue<br>           



                                                  Duration           



                                                  of             



                                                  therapy:           



                                                  72 hours           

 

     enoxaparin      2022      Yes            40mg      40 mg,           Unive

rs



     (LOVENOX)      0-21                               Subcutaneo           ity 

of



     injection      22:00:                               us, DAILY,           [facility]

[facility]



     40 mg      00                                 First dose           Medical



                                                  on Fri           [facility]



                                                  10/21/22           



                                                  at 1700,           



                                                  Until           



                                                  Discontinu           



                                                  ed,            



                                                  Routine           

 

     ceFEPIme      2022- Yes            2000mg      2,000 mg,           U

nivers



     (MAXIPIME)      0-21 10-26                          IV             ity of



     2,000 mg in      21:00: 20:59                          Piggyback,          

 [place]



     NaCl 0.9%      00   :00                           Q8H ABX,           Medica

l



     (NS) 50 mL                                         15 doses,           [facility]





     MINI-BAG                                         First dose           



                                                  on Fri           



                                                  10/21/22           



                                                  at 1600,           



                                                  Last dose           



                                                  on Wed           



                                                  10/26/22           



                                                  at 0800,           



                                                  Administer           



                                                  over 4           



                                                  Hours, 50           



                                                  mL<br>Reas           



                                                  on for           



                                                  Anti-Infec           



                                                  tive:           



                                                  Empiric           



                                                  Therapy           



                                                  for            



                                                  Suspected           



                                                  Infection<           



                                                  br>Empiric           



                                                  Therapy           



                                                  Site:           



                                                  Abdominal<           



                                                  br>Duratio           



                                                  n of           



                                                  therapy: 5           



                                                  days           

 

     HYDROmorpho      2022      Yes            4mg       4 mg,           Unive

rs



     ne        0-21                               Oral,           ity of



     (DILAUDID)      19:38:                               Q4HPRN,           [place]

[place]



     tablet 4 mg      15                                 Starting           Medi

sarah



                                                  on Fri           [facility]



                                                  10/21/22           



                                                  at 1438,           



                                                  Until           



                                                  Discontinu           



                                                  ed,            



                                                  Routine,           



                                                  Pain           



                                                  (scale           



                                                  7-10)           

 

     sodium      2022      Yes                      Topical,           [facility]



     hypochlorit      0-21                               BID, First           it

y of



     e 0.125 %      18:30:                               dose on           [place]



     (DAKIN'S                  Medical



     SOLUTION)                                         10/21/22           [facility]



     solution                                         at 1330,           



                                                  Until           



                                                  Discontinu           



                                                  ed,            



                                                  Routine           

 

     ceFEPIme      2022- No             2000mg      2,000 mg,           U

nivers



     (MAXIPIME)      0-21 10-21                          IV             ity of



     2,000 mg in      13:15: 14:55                          [facility],          

 [place]



     NaCl 0.9%      00   :00                           ONCE, 1           Medical



     (NS) 50 mL                                         dose, On           Branc

h



     MINI-BAG                                         Fri            



                                                  10/21/22           



                                                  at 0815,           



                                                  Administer           



                                                  over 30           



                                                  Minutes,           



                                                  50             



                                                  mL<br>Reas           



                                                  on for           



                                                  Anti-Infec           



                                                  tive:           



                                                  Empiric           



                                                  Therapy           



                                                  for            



                                                  Suspected           



                                                  Infection<           



                                                  br>Empiric           



                                                  Therapy           



                                                  Site:           



                                                  Urine<br>D           



                                                  uration of           



                                                  therapy: 5           



                                                  days           

 

     Sliding      2022      Yes                      Subcutaneo           Univ

ers



     Scale      0-21                               us, AC+HS,           ity of



     Insulin-Reg      12:30:                               First dose           

[place]



     ular + Fsbg      00                                 on Fri           Medica

l



     Testing                                         10/21/22           [facility]



                                                  at 0730,           



                                                  Until           



                                                  Discontinu           



                                                  ed,            



                                                  Routine           

 

     acetaminoph      2022      Yes            650mg      650 mg,           Un

[facility]



     en        0-                               Oral,           ity of



     (TYLENOL)      12:08:                               Q6HPRN,           [place]



     tablet 650      12                                 Starting           Medic

al



     mg                                           on Fri           [facility]



                                                  10/21/22           



                                                  at 0708,           



                                                  Until           



                                                  Discontinu           



                                                  ed,            



                                                  Routine,           



                                                  Pain           



                                                  (scale           



                                                  1-3)           

 

     HYDROmorpho      2022- No             .5mg      0.5 mg,           Un

[facility]



     ne        0-21 10-21                          Slow IV           ity of



     (DILAUDID)      12:07: 16:23                          Push,           [place]



     injection      21   :00                           Q4HPRN, 2           Medic

al



     0.5 mg                                         doses,           [facility]



                                                  Starting           



                                                  on Fri           



                                                  10/21/22           



                                                  at 0707,           



                                                  Until           



                                                  Discontinu           



                                                  ed,            



                                                  Routine,           



                                                  Pain           



                                                  (scale           



                                                  7-10)<br>U           



                                                  se             



                                                  approved           



                                                  by             



                                                  (Faculty):           



                                                  ADC            



                                                  PROVIDER           

 

     proMETHazin      2022      Yes            12.5mg      12.5 mg,           

[facility]



     e         0                               IV             ity of



     (PHENERGAN)      12:07:                               Piggyback,           

[place]



     12.5 mg in      04                                 Q4HPRN,           [facility]

l



     NaCl 0.9%                                         Starting           [facility]



     (NS) 50 mL                                         on Fri           



     IV                                           10/21/22           



     piggyback                                         at 0707,           



                                                  Until           



                                                  Discontinu           



                                                  ed,            



                                                  Routine,           



                                                  Nausea and           



                                                  Vomiting           



                                                  (N/V)           

 

     dextrose      2022      Yes            250mL      250 mL, IV           Un

[facility]



     10% (D10W)      0-21                               Infusion,           ity 

of



     bolus      11:02:                               PRN - SEE           [place]



     infusion      05                                 INSTRUCTIO           Medic

al



     250 mL                                         NS,            [facility]



                                                  Administer           



                                                  over 60           



                                                  Minutes,           



                                                  Other, If           



                                                  blood           



                                                  glucose is           



                                                  &amp;lt;           



                                                  or = 70           



                                                  mg/dL and           



                                                  patient is           



                                                  unable to           



                                                  swallow or           



                                                  has mental           



                                                  status           



                                                  changes,           



                                                  Starting           



                                                  on Fri           



                                                  10/21/22           



                                                  at             



                                                  0602<br>If           



                                                  blood           



                                                  glucose is           



                                                  < or = 70           



                                                  mg/dL and           



                                                  patient is           



                                                  unable to           



                                                  swallow or           



                                                  has mental           



                                                  status           



                                                  changes           



                                                  (Give           



                                                  glucagon           



                                                  order if           



                                                  patient           



                                                  needs           



                                                  fluid           



                                                  restrictio           



                                                  n):            



                                                  &nbsp;IF           



                                                  IV access           



                                                  available:           



                                                  Dextrose           



                                                  10%.           



                                                  &nbsp;1.           



                                                  125 mL (?           



                                                  bag) of           



                                                  D10W IV           



                                                  infusion -           



                                                  equivalent           



                                                  to 12.5 g           



                                                  dextrose           



                                                  &nbsp;2.           



                                                  Blood           



                                                  glucose -           



                                                  draw blood           



                                                  glucose 15           



                                                  minutes           



                                                  after D10W           



                                                  Administra           



                                                  tion.           



                                                  &amp;nbsp;           



                                                  3. If           



                                                  blood           



                                                  glucose is           



                                                  < 80           



                                                  mg/dL,           



                                                  repeat.<br           



                                                  >              

 

     acetaminoph      2022      Yes            650mg      650 mg,           [facility]

[facility]



     en                                       Oral,           ity of



     (TYLENOL)      11:01:                               Q6HPRN,           [place]



     tablet 650      26                                 Starting           Medic

al



     mg                                           on Fri           [facility]



                                                  10/21/22           



                                                  at 0601,           



                                                  Until           



                                                  Discontinu           



                                                  ed,            



                                                  Routine,           



                                                  Pain           



                                                  (scale           



                                                  1-3)           

 

     iopamidol      2022- No        [id number] 100mL      100 mL,          

 [facility]



     (ISOVUE      0-21 10-21                          Intravenou           ity o

f



     370-500 mL)      08:00: 08:00                          s, ONCE, 1          

 [place]



     injection      00   :00                           dose, On           [facility]

l



     100 mL                                         Fri            [facility]



                                                  10/21/22           



                                                  at 0300,           



                                                  Routine           

 

     ceFEPIme      2022- No             1000mg      1,000 mg,           U

[facility]



     (MAXIPIME)      0-21 10-21                          IV             ity of



     injection      07:15: 07:36                          Piggyback,           [facility]

[facility]



     1,000 mg      00   :00                           ONCE, 1           Medical



                                                  dose, On           [facility]



                                                  Fri            



                                                  10/21/22           



                                                  at 0215,           



                                                  STAT<br>Re           



                                                  ason for           



                                                  Anti-Infec           



                                                  tive:           



                                                  Documented           



                                                  Infection<           



                                                  br>Documen           



                                                  huma            



                                                  Infection           



                                                  Site: Skin           



                                                  / Soft           



                                                  Tissue<br>           



                                                  Duration           



                                                  of             



                                                  Therapy:           



                                                  Other (see           



                                                  Comments)           

 

     FENTanyl PF      2022- No             50ug      50 mcg,           Un

[facility]



     (SUBLIMAZE      0-21 10-21                          Slow IV           ity o

f



     (PF))      06:30: 06:12                          Push,           [place]



     injection      00   :00                           ONCE, 1           Medical



     50 mcg                                         dose, On           [facility]



                                                  Fri            



                                                  10/21/22           



                                                  at 0130,           



                                                  ASAP           

 

     proMETHazin      2022- No             12.5mg      12.5 mg,          

 [facility]



     e         0-21 10-21                          IV             ity of



     (PHENERGAN)      06:15: 06:12                          Piggyback,          

 [place]



     12.5 mg in      00   :00                           ONCE, 1           Med[facility]

l



     NaCl 0.9%                                         dose, On           [facility]



     (NS) 50 mL                                         Fri            



     IV                                           10/21/22           



     piggyback                                         at 0115,           



                                                  ASAP           

 

     insulin            Yes            26U       26 Units,           Unive

rs



     glargine      8-14                               Subcutaneo           ity o

f



     (LANTUS      02:00:                               us, [facility],           [place]



     U-100)      00                                 First dose           Medical



     injection                                         (after           [facility]



     26 Units                                         last           



                                                  modificati           



                                                  on) on Sat           



                                                  22 at           



                                                  2100,           



                                                  Until           



                                                  Discontinu           



                                                  ed,            



                                                  Routine           

 

     insulin      2022- No             24U       24 Units,           Univ

ers



     glargine                                Subcutaneo           ity 

of



     (LANTUS      20:38: 20:43                          us, ONCE,           [place]

[facility]



     U-100)      00   :00                           1 dose, On           Medical



     injection                                         Fri            [facility]



     24 Units                                         22 at           



                                                  1545, ASAP           

 

     sennosides-            Yes            1{tbl}      1 tablet,          

 [facility]



     docusate                                     Oral,           ity of



     sodium      14:00:                               DAILY,           [place]



     (SENOKOT-S)      00                                 First dose           Me

dical



     8.6-50 mg                                         on Fri           Br[facility]



     per tablet                                         22 at           



     1 tablet                                         0900,           



                                                  Until           



                                                  Discontinu           



                                                  ed,            



                                                  Routine           

 

     polyethylen            Yes            17g       17 g,           Unive

rs



     e glycol                                     Oral,           ity of



     3350 powder      14:00:                               DAILY,           [place]

[place]



     17 g      00                                 First dose           Medical



                                                  on Fri           [facility]



                                                  22 at           



                                                  0900,           



                                                  Until           



                                                  Discontinu           



                                                  ed,            



                                                  Routine           

 

     insulin NPH      2022- No             16U       16 Units,           

[facility]



     (HUMULIN N)                                Subcutaneo           i

ty of



     injection      14:00: 17:23                          us,            [place]



     16 Units      00   :36                           QAM+HS,           Medical



                                                  First dose           [facility]



                                                  (after           



                                                  last           



                                                  modificati           



                                                  on) on Fri           



                                                  22 at           



                                                  0900,           



                                                  Until           



                                                  Discontinu           



                                                  ed,            



                                                  Routine           

 

     Sliding            Yes                      Subcutaneo           Univ

ers



     Scale      -12                               us, TID           ity of



     Insulin -      13:00:                               MEALS+HS,           [facility]

as



     Lispro      00                                 First dose           Medical



     (HumaLOG) +                                         (after           Branch



     Fsbg                                         last           



     Testing                                         modificati           



                                                  on) on 22 at           



                                                  0800,           



                                                  Until           



                                                  Discontinu           



                                                  ed,            



                                                  Routine           

 

     insulin NPH      2022- No             8U        8 Units,           U

nivers



     (HUMULIN N)                                Subcutaneo           i

ty of



     injection 8      02:00: 12:34                          us, QHS,           T

[facility]



     Units      00   :51                           First dose           Medical



                                                  (after           Branch



                                                  last           



                                                  modificati           



                                                  on) on Thu           



                                                  22 at           



                                                  2100,           



                                                  Until           



                                                  Discontinu           



                                                  ed,            



                                                  Routine           

 

     repaglinide      -0      Yes       [id number] .5mg      Take 1          

 [facility]



     0.5 mg      8-12                               tablet by           ity of



     tablet      00:00:                               mouth in           [address]



                                                  morning           [place]



                                                  and 1           



                                                  tablet at           



                                                  noon and 1           



                                                  tablet in           



                                                  the            



                                                  evening.           



                                                  Take           



                                                  before           



                                                  meals.           

 

     sodium      -0      Yes       [id number]           Apply to           Un

[facility]



     hypochlorit      8-12                               area(s)           ity o

f



     e 0.25%      00:00:                               daily.           [address]

 

     repaglinide      -0      Yes       [id number] .5mg      Take 1          

 [facility]



     0.5 mg      8-12                               tablet by           ity of



     tablet      00:00:                               mouth in           [address]



                                                  and 1           



                                                  tablet at           



                                                  noon and 1           



                                                  tablet in           



                                                  the            



                                                  evening.           



                                                  Take           



                                                  before           



                                                  meals.           

 

     sodium      -0      Yes       [id number]           Apply to           Un

[facility]



     hypochlorit      8-12                               area(s)           ity o

f



     e 0.25%      00:00:                               daily.           [address]

 

     repaglinide      -0      Yes       [id number] .5mg      Take 1          

 [facility]



     0.5 mg      8-12                               tablet by           ity of



     tablet      00:00:                               mouth in           [address]



                                                  and 1           



                                                  tablet at           



                                                  noon and 1           



                                                  tablet in           



                                                  the            



                                                  evening.           



                                                  Take           



                                                  before           



                                                  meals.           

 

     sodium      -0      Yes       [id number]           Apply to           Un

[facility]



     hypochlorit      8-12                               area(s)           ity o

f



     e 0.25%      00:00:                               daily.           [address]

 

     repaglinide      2-0      Yes       [id number] .5mg      Take 1          

 [facility]



     0.5 mg      8-12                               tablet by           ity of



     tablet      00:00:                               mouth in           [address]



                                                  morning           [place]



                                                  and 1           



                                                  tablet at           



                                                  noon and 1           



                                                  tablet in           



                                                  the            



                                                  evening.           



                                                  Take           



                                                  before           



                                                  meals.           

 

     sodium      -0      Yes       [id number]           Apply to           Un

[facility]



     hypochlorit      8-12                               area(s)           ity o

f



     e 0.25%      00:00:                               daily.           [place]



     solution      00                                                Medical



                                                                 Branch

 

     repaglinide      2022-0      Yes       [id number] .5mg      Take 1          

 [facility]



     0.5 mg      8-12                               tablet by           ity of



     tablet      00:00:                               mouth in           [place]



               00                                 the            Medical



                                                  morning           Branch



                                                  and 1           



                                                  tablet at           



                                                  noon and 1           



                                                  tablet in           



                                                  the            



                                                  evening.           



                                                  Take           



                                                  before           



                                                  meals.           

 

     sodium      2022-0      Yes       [id number]           Apply to           [facility]

[facility]



     hypochlorit      8-12                               area(s)           ity o

f



     e 0.25%      00:00:                               daily.           [place]



     solution      00                                                Medical



                                                                 [facility]

 

     repaglinide      2022-0      Yes       [id number] .5mg      Take 1          

 [facility]



     0.5 mg      8-12                               tablet by           ity of



     tablet      00:00:                               mouth in           [place]



               00                                 the            Medical



                                                  morning           [facility]



                                                  and 1           



                                                  tablet at           



                                                  noon and 1           



                                                  tablet in           



                                                  the            



                                                  evening.           



                                                  Take           



                                                  before           



                                                  meals.           

 

     sodium      2022-0      Yes       [id number]           Apply to           Un

[facility]



     hypochlorit      8-12                               area(s)           ity o

f



     e 0.25%      00:00:                               daily.           [place]



     solution      00                                                Medical



                                                                 [facility]

 

     repaglinide      2022-0      Yes       [id number] .5mg      Take 1          

 [facility]



     0.5 mg      8-12                               tablet by           ity of



     tablet      00:00:                               mouth in           [place]



               00                                 the            Medical



                                                  morning           [facility]



                                                  and 1           



                                                  tablet at           



                                                  noon and 1           



                                                  tablet in           



                                                  the            



                                                  evening.           



                                                  Take           



                                                  before           



                                                  meals.           

 

     sodium      2022-0      Yes       [id number]           Apply to           Un

[facility]



     hypochlorit      8-12                               area(s)           ity o

f



     e 0.25%      00:00:                               daily.           [place]



     solution      00                                                Medical



                                                                 Branch

 

     repaglinide      2022-0      Yes       [id number] .5mg      Take 1          

 [facility]



     0.5 mg      8-12                               tablet by           ity of



     tablet      00:00:                               mouth in           [place]



               00                                 the            Medical



                                                  morning           [place]



                                                  and 1           



                                                  tablet at           



                                                  noon and 1           



                                                  tablet in           



                                                  the            



                                                  evening.           



                                                  Take           



                                                  before           



                                                  meals.           

 

     sodium      2022-0      Yes       [id number]           Apply to           Un

[facility]



     hypochlorit      8-12                               area(s)           ity o

f



     e 0.25%      00:00:                               daily.           [place]



     solution      00                                                Medical



                                                                 Branch

 

     repaglinide      2022-0      Yes       [id number] .5mg      Take 1          

 [facility]



     0.5 mg      8-12                               tablet by           ity of



     tablet      00:00:                               mouth in           [place]



               00                                 the            Medical



                                                  morning           [place]



                                                  and 1           



                                                  tablet at           



                                                  noon and 1           



                                                  tablet in           



                                                  the            



                                                  evening.           



                                                  Take           



                                                  before           



                                                  meals.           

 

     sodium            Yes       [id number]           Apply to           Un

[facility]



     hypochlorit      8                               area(s)           ity o

f



     e 0.25%      00:00:                               daily.           [address]

 

     repaglinide            Yes       [id number] .5mg      Take 1          

 [facility]



     0.5 mg                                     tablet by           ity of



     tablet      00:00:                               mouth in           [place]



               00                                 the            [facility]



                                                  and 1           



                                                  tablet at           



                                                  noon and 1           



                                                  tablet in           



                                                  the            



                                                  evening.           



                                                  Take           



                                                  before           



                                                  meals.           

 

     sodium            Yes       [id number]           Apply to           Un

[facility]



     hypochlorit                                     area(s)           ity o

f



     e 0.25%      00:00:                               daily.           [address]

 

     apixaban 5      2022- No             5mg       Take 1           Univ

ers



     mg tablet                                tablet by           ity 

of



               00:00: 04:59                          mouth in           [place]



               00   :00                           [facility]



                                                  and 1           



                                                  tablet in           



                                                  the            



                                                  evening.           



                                                  Do all           



                                                  this for           



                                                  30 days.           



                                                  Indication           



                                                  s:             



                                                  Symtomatic           



                                                  Superficia           



                                                  l Vein           



                                                  thrombosis           

 

     insulin      2022- No        [id number] 24U       inject 24          

 [facility]



     glargine                                Units           ity of



     100 unit/mL      00:00: 04:59                          under the           

[place]



     injection      00   :00                           skin at           [facility]



                                                  bedtime           [place]



                                                  for 30           



                                                  days.           

 

     metFORMIN      2022- No        [id number] 500mg      Take 1          

 [facility]



     500 mg                                tablet by           ity of



     tablet      00:00: 04:59                          mouth in           [place]



               00   :00                           [facility]



                                                  and 1           



                                                  tablet in           



                                                  the            



                                                  evening.           



                                                  Take with           



                                                  meals. Do           



                                                  all this           



                                                  for 30           



                                                  days.           

 

     dulaglutide      2022- No        [id number] .75mg      inject 1      

     [facility]



     (TRULICITY)                                Pen under           it

y of



     0.75 mg/0.5      00:00: 04:59                          the skin           [facility]

[facility]



     mL PnIj      00   :00                           weekly for           [facility]

l



                                                  30 days.           [facility]

 

     apixaban 5      2022- No             5mg       Take 1           Univ

ers



     mg tablet                                tablet by           ity 

of



               00:00: 04:59                          mouth in           [place]



               00   :00                           [facility]



                                                  and 1           



                                                  tablet in           



                                                  the            



                                                  evening.           



                                                  Do all           



                                                  this for           



                                                  30 days.           



                                                  Indication           



                                                  s:             



                                                  Symtomatic           



                                                  Superficia           



                                                  l Vein           



                                                  thrombosis           

 

     insulin      2022- No        [id number] 24U       inject 24          

 [facility]



     glargine                                Units           ity of



     100 unit/mL      00:00: 04:59                          under the           

[place]



     injection      00   :00                           skin at           [facility]



                                                  for 30           



                                                  days.           

 

     metFORMIN      -2022- No        [id number] 500mg      Take 1          

 [facility]



     500 mg                                tablet by           ity of



     tablet      00:00: 04:59                          mouth in           [place]



               00   :00                           the            Medical



                                                  morning           [place]



                                                  and 1           



                                                  tablet in           



                                                  the            



                                                  evening.           



                                                  Take with           



                                                  meals. Do           



                                                  all this           



                                                  for 30           



                                                  days.           

 

     dulaglutide      2022- No        [id number] .75mg      inject 1      

     [facility]



     (TRULICITY)                                Pen under           it

y of



     0.75 mg/0.5      00:00: 04:59                          the skin           [facility]

[facility]



     mL PnIj      00   :00                           weekly for           [facility]

l



                                                  30 days.           [facility]

 

     apixaban 5      2022- No             5mg       Take 1           Univ

ers



     mg tablet                                tablet by           ity 

of



               00:00: 04:59                          mouth in           [place]



               00   :00                           the            [facility]



                                                  and 1           



                                                  tablet in           



                                                  the            



                                                  evening.           



                                                  Do all           



                                                  this for           



                                                  30 days.           



                                                  Indication           



                                                  s:             



                                                  Symtomatic           



                                                  Superficia           



                                                  l Vein           



                                                  thrombosis           

 

     insulin      2022- No        [id number] 24U       inject 24          

 [facility]



     glargine                                Units           ity of



     100 unit/mL      00:00: 04:59                          under the           

[place]



     injection      00   :00                           skin [facility]



                                                  for 30           



                                                  days.           

 

     metFORMIN      -2022- No        [id number] 500mg      Take 1          

 [facility]



     500 mg                                tablet by           ity of



     tablet      00:00: 04:59                          mouth in           [place]



               00   :00                           the            Medical



                                                  morning           [place]



                                                  and 1           



                                                  tablet in           



                                                  the            



                                                  evening.           



                                                  Take with           



                                                  meals. Do           



                                                  all this           



                                                  for 30           



                                                  days.           

 

     dulaglutide      -2022- No        [id number] .75mg      inject 1      

     [facility]



     (TRULICITY)                                Pen under           it

y of



     0.75 mg/0.5      00:00: 04:59                          the skin           [facility]

[facility]



     mL PnIj      00   :00                           weekly for           [facility]

l



                                                  30 days.           [facility]

 

     apixaban 5      2022- No             5mg       Take 1           Univ

ers



     mg tablet                                tablet by           ity 

of



               00:00: 04:59                          mouth in           [place]



               00   :00                           the            Medical



                                                  morning           [facility]



                                                  and 1           



                                                  tablet in           



                                                  the            



                                                  evening.           



                                                  Do all           



                                                  this for           



                                                  30 days.           



                                                  Indication           



                                                  s:             



                                                  Symtomatic           



                                                  Superficia           



                                                  l Vein           



                                                  thrombosis           

 

     insulin      2022- No        [id number] 24U       inject 24          

 [facility]



     glargine                                Units           ity of



     100 unit/mL      00:00: 04:59                          under the           

[place]



     injection      00   :00                           skin at           [facility]



                                                  bedtime           [facility]



                                                  for 30           



                                                  days.           

 

     metFORMIN      2022- No        [id number] 500mg      Take 1          

 [facility]



     500 mg                                tablet by           ity of



     tablet      00:00: 04:59                          mouth in           [place]



               00   :00                           the            Medical



                                                  morning           [facility]



                                                  and 1           



                                                  tablet in           



                                                  the            



                                                  evening.           



                                                  Take with           



                                                  meals. Do           



                                                  all this           



                                                  for 30           



                                                  days.           

 

     dulaglutide      2022- No        [id number] .75mg      inject 1      

     [facility]



     (TRULICITY)                                Pen under           it

y of



     0.75 mg/0.5      00:00: 04:59                          the skin           [facility]

[facility]



     mL PnIj      00   :00                           weekly for           [facility]

l



                                                  30 days.           [facility]

 

     linezolid      2022- No        [id number] 600mg      Take 1          

 [facility]



     600 mg                                tablet by           ity of



     tablet      00:00: 04:59                          mouth           [place]



               00   :00                           every 12           [facility]



                                                  (twelve)           [facility]



                                                  hours for           



                                                  14 days.           

 

     fluconazole      2022- No        [id number] 400mg      Take 2        

   [facility]



     200 mg                                tablets by           ity of



     tablet      00:00: 04:59                          mouth in           [place]



               00   :00                           the            Medical



                                                  morning           [facility]



                                                  for 14           



                                                  days.           

 

     ciprofloxac      2022- No        [id number] 500mg      Take 2        

   [facility]



     in HCl 250                                tablets by           it

y of



     mg tablet      00:00: 04:59                          mouth in           [place]

as



               00   :00                           the            Medical



                                                  morning           [facility]



                                                  and 2           



                                                  tablets in           



                                                  the            



                                                  evening.           



                                                  Do all           



                                                  this for           



                                                  14 days.           

 

     linezolid      2022- No        [id number] 600mg      Take 1          

 [facility]



     600 mg                                tablet by           ity of



     tablet      00:00: 04:59                          mouth           [place]



               00   :00                           every 12           Medical



                                                  (twelve)           [facility]



                                                  hours for           



                                                  14 days.           

 

     fluconazole      -2022- No        [id number] 400mg      Take 2        

   [facility]



     200 mg      8-12 08-27                          tablets by           ity of



     tablet      00:00: 04:59                          mouth in           [place]



               00   :00                           the            Medical



                                                  morning           [facility]



                                                  for 14           



                                                  days.           

 

     ciprofloxac      -0 - No        [id number] 500mg      Take 2        

   [facility]



     in HCl 250      8- 08-27                          tablets by           it

y of



     mg tablet      00:00: 04:59                          mouth in           [place]

as



               00   :00                           the            Medical



                                                  morning           [facility]



                                                  and 2           



                                                  tablets in           



                                                  the            



                                                  evening.           



                                                  Do all           



                                                  this for           



                                                  14 days.           

 

     linezolid      -0 - No        [id number] 600mg      Take 1          

 [facility]



     600 mg      8--27                          tablet by           ity of



     tablet      00:00: 04:59                          mouth           [place]



               00   :00                           every 12           [facility]



                                                  (twelve)           [place]



                                                  hours for           



                                                  14 days.           

 

     fluconazole      -0 - No        [id number] 400mg      Take 2        

   [facility]



     200 mg      8--27                          tablets by           ity of



     tablet      00:00: 04:59                          mouth in           [place]



               00   :00                           the            [facility]



                                                  for 14           



                                                  days.           

 

     ciprofloxac      -0 - No        [id number] 500mg      Take 2        

   [facility]



     in HCl 250      8--27                          tablets by           it

y of



     mg tablet      00:00: 04:59                          mouth in           [facility]

as



               00   :00                           the            Medical



                                                  morning           [facility]



                                                  and 2           



                                                  tablets in           



                                                  the            



                                                  evening.           



                                                  Do all           



                                                  this for           



                                                  14 days.           

 

     linezolid      -2022- No        [id number] 600mg      Take 1          

 [facility]



     600 mg      8--27                          tablet by           ity of



     tablet      00:00: 04:59                          mouth           [place]



               00   :00                           every 12           [facility]



                                                  (twelve)           [place]



                                                  hours for           



                                                  14 days.           

 

     fluconazole      -2022- No        [id number] 400mg      Take 2        

   [facility]



     200 mg      8- 08-27                          tablets by           ity of



     tablet      00:00: 04:59                          mouth in           [place]



               00   :00                           the            [facility]



                                                  for 14           



                                                  days.           

 

     ciprofloxac      -0 - No        [id number] 500mg      Take 2        

   [facility]



     in HCl 250      8--27                          tablets by           it

y of



     mg tablet      00:00: 04:59                          mouth in           [facility]

as



               00   :00                           the            Medical



                                                  morning           [facility]



                                                  and 2           



                                                  tablets in           



                                                  the            



                                                  evening.           



                                                  Do all           



                                                  this for           



                                                  14 days.           

 

     gabapentin      -0 - No        [id number] 300mg      Take 1         

  [facility]



     300 mg                                capsule by           ity of



     capsule      00:00: 04:59                          mouth in           [place]



               00   :00                           [facility]



                                                  and 1           



                                                  capsule at           



                                                  noon and 1           



                                                  capsule in           



                                                  the            



                                                  evening.           



                                                  Do all           



                                                  this for 7           



                                                  days.           

 

     gabapentin       No        [id number] 300mg      Take 1         

  [facility]



     300 mg                                capsule by           ity of



     capsule      00:00: 04:59                          mouth in           [place]



               00   :00                           [facility]



                                                  and 1           



                                                  capsule at           



                                                  noon and 1           



                                                  capsule in           



                                                  the            



                                                  evening.           



                                                  Do all           



                                                  this for 7           



                                                  days.           

 

     gabapentin       No        [id number] 300mg      Take 1         

  [facility]



     300 mg                                capsule by           ity of



     capsule      00:00: 04:59                          mouth in           [place]



               00   :00                           [facility]



                                                  and 1           



                                                  capsule at           



                                                  noon and 1           



                                                  capsule in           



                                                  the            



                                                  evening.           



                                                  Do all           



                                                  this for 7           



                                                  days.           

 

     gabapentin       No        [id number] 300mg      Take 1         

  [facility]



     300 mg                                capsule by           ity of



     capsule      00:00: 04:59                          mouth in           [place]



               00   :00                           [facility]



                                                  and 1           



                                                  capsule at           



                                                  noon and 1           



                                                  capsule in           



                                                  the            



                                                  evening.           



                                                  Do all           



                                                  this for 7           



                                                  days.           

 

     sodium      2022- No        [id number]           Apply to           U

[facility]



     hypochlorit                                area(s)           ity 

of



     e 0.25%      00:00: 00:00                          daily.           [place]



     solution      00   :00                                          [facility]

 

     repaglinide       No        [id number] .5mg      Take 1         

  [facility]



     0.5 mg                                tablet by           ity of



     tablet      00:00: 00:00                          mouth in           [place]



               00   :00                           [facility]



                                                  and 1           



                                                  tablet at           



                                                  noon and 1           



                                                  tablet in           



                                                  the            



                                                  evening.           



                                                  Take           



                                                  before           



                                                  meals. Do           



                                                  all this           



                                                  for 30           



                                                  days.           

 

     Sliding      2022- No                       Subcutaneo           Uni

vers



     Scale                                us, TID           ity of



     Insulin -      22:00: 12:34                          MEALS+HS,           [place]

[place]



     Lispro      00   :51                           First dose           Medical



     (HumaLOG) +                                         (after           [facility]



     Fsbg                                         last           



     Testing                                         modificati           



                                                  on) on Thu           



                                                  22 at           



                                                  1700,           



                                                  Until           



                                                  Discontinu           



                                                  ed,            



                                                  Routine           

 

     acetaminoph            Yes            1000mg      1,000 mg,          

 [facility]



     en                                       Oral, Q8H,           ity of



     (TYLENOL)      19:00:                               First dose           [facility]

[place]



     tablet      00                                 on Thu           Medical



     1,000 mg                                         22 at           [facility]

h



                                                  1400,           



                                                  Until           



                                                  Discontinu           



                                                  ed,            



                                                  Routine           

 

     methocarbam            Yes            500mg      500 mg,           Un

[place]



     oL                                       Oral, Q6H,           ity of



     (ROBAXIN)      17:00:                               First dose           [facility]

[place]



     tablet 500      00                                 on Thu           Medical



     mg                                           22 at           [facility]



                                                  1200,           



                                                  Until           



                                                  Discontinu           



                                                  ed,            



                                                  Routine           

 

     Sliding      2022- No                       Subcutaneo           Uni

vers



     Scale                                us, TID           ity of



     Insulin -      17:00: 19:31                          MEALS+HS,           [place]

[place]



     Lispro      00   :30                           First dose           Medical



     (HumaLOG) +                                         (after           [place]



     Fsbg                                         last           



     Testing                                         modificati           



                                                  on) on Thu           



                                                  22 at           



                                                  1200,           



                                                  Until           



                                                  Discontinu           



                                                  ed,            



                                                  Routine           

 

     insulin NPH            Yes            24U       24 Units,           U

nivers



     (HUMULIN N)                                     Subcutaneo           it

y of



     injection      14:00:                               us, QAM,           [place]

s



     24 Units      00                                 First dose           Medic

al



                                                  (after           [facility]



                                                  last           



                                                  modificati           



                                                  on) on Thu           



                                                  22 at           



                                                  0900,           



                                                  Until           



                                                  Discontinu           



                                                  ed,            



                                                  Routine           

 

     insulin NPH       No             20U       20 Units,           

[facility]



     (HUMULIN N)                                Subcutaneo           i

ty of



     injection      14:00: 19:31                          us, QAM,           [facility]

as



     20 Units      00   :02                           First dose           Medic

al



                                                  (after           [facility]



                                                  last           



                                                  modificati           



                                                  on) on Thu           



                                                  22 at           



                                                  0900,           



                                                  Until           



                                                  Discontinu           



                                                  ed,            



                                                  Routine           

 

     insulin            Yes            5U        5 Units,           Univer

s



     lispro                                     Subcutaneo           ity of



     (human)      13:45:                               us, TID           [place]



     (HumaLOG      00                                 MEALS,           Medical



     U-100)                                         First dose           [facility]



     injection 5                                         (after           



     Units                                         last           



                                                  modificati           



                                                  on) on Thu           



                                                  22 at           



                                                  0845,           



                                                  Until           



                                                  Discontinu           



                                                  ed,            



                                                  Routine           

 

     apixaban            Yes            5mg       5 mg,           [facility]



     (ELIQUIS)                                     Oral, BID,           ity 

of



     tablet 5 mg      13:00:                               First dose           

[place]



               00                                 on Thu           Medical



                                                  22 at           [facility]



                                                  0800,           



                                                  Until           



                                                  Discontinu           



                                                  ed,            



                                                  Routine<br           



                                                  >Indicatio           



                                                  ns: DVT/PE           

 

     celecoxib            Yes            100mg      100 mg,           Univ

ers



     (CELEBREX)                                     Oral, BID           ity 

of



     capsule 100      13:00:                               MEALS,           [place]

[place]



     mg        00                                 First dose           Medical



                                                  on Thu           [facility]



                                                  22 at           



                                                  0800,           



                                                  Until           



                                                  Discontinu           



                                                  ed,            



                                                  Routine           

 

     gabapentin            Yes            300mg      300 mg,           Uni

vers



     (NEURONTIN)                                     Oral, TID,           it

y of



     capsule 300      13:00:                               First dose           

[place]



     mg        00                                 on Thu           Medical



                                                  22 at           [facility]



                                                  0800,           



                                                  Until           



                                                  Discontinu           



                                                  ed,            



                                                  Routine           

 

     HYDROmorpho            Yes            4mg       4 mg,           Unive

rs



     ne                                       Oral, TID,           ity of



     (DILAUDID)      13:00:                               First dose           T

[place]



     tablet 4 mg      00                                 (after           Medica

l



                                                  last           [facility]



                                                  modificati           



                                                  on) on u           



                                                  22 at           



                                                  0800,           



                                                  Until           



                                                  Discontinu           



                                                  ed,            



                                                  Routine           

 

     linezolid      2022- No             600mg      600 mg,           Uni

vers



     (ZYVOX)                                Oral,           ity of



     tablet 600      03:15: 03:49                          Q12H, 1           [facility]

as



     mg        00   :00                           dose,           Medical



                                                  First dose           [facility]



                                                  on Wed           



                                                  8/10/22 at           



                                                  2215,           



                                                  ASAP<br>Re           



                                                  ason for           



                                                  Anti-Infec           



                                                  tive:           



                                                  Documented           



                                                  Infection<           



                                                  br>Documen           



                                                  huma            



                                                  Infection           



                                                  Site: Skin           



                                                  / Soft           



                                                  Tissue<br>           



                                                  Duration           



                                                  of             



                                                  Therapy: 7           



                                                  days<br>Re           



                                                  stricted           



                                                  use            



                                                  approved           



                                                  by: AUSTIN SPANGLER,           



                                                  ADULT ID           

 

     heparin      2022- No             5000U      5,000           [facility]



     (porcine)                                Units,           ity of



     injection      01:00: 12:21                          Subcutaneo           T

[place]



     5,000 Units      00   :15                           us, BID,           Medi

[place]



                                                  First dose           [facility]



                                                  on Wed           



                                                  8/10/22 at           



                                                  2000,           



                                                  Until           



                                                  Discontinu           



                                                  ed,            



                                                  Routine           

 

     insulin            Yes            10U       10 Units,           Unive

rs



     lispro      8-10                               Subcutaneo           ity of



     (human)      22:00:                               us, TID           [place]



     (HumaLOG      00                                 MEALS,           Medical



     U-100)                                         First dose           [facility]



     injection                                         (after           



     10 Units                                         last           



                                                  modificati           



                                                  on) on Wed           



                                                  8/10/22 at           



                                                  1700,           



                                                  Until           



                                                  Discontinu           



                                                  ed,            



                                                  Routine           

 

     insulin NPH            Yes            20U       20 Units,           U

nivers



     (HUMULIN N)      8-10                               Subcutaneo           it

y of



     injection      22:00:                               us, QPM,           [place]

[facility]



     20 Units      00                                 First dose           Medic

al



                                                  (after           [facility]anch



                                                  last           



                                                  modificati           



                                                  on) on Wed           



                                                  8/10/22 at           



                                                  1700,           



                                                  Until           



                                                  Discontinu           



                                                  ed,            



                                                  Routine           

 

     insulin NPH       No             20U       20 Units,           

[facility]



     (HUMULIN N)      8-10 08-11                          Subcutaneo           i

ty of



     injection      22:00: 13:38                          us, QPM,           [facility]

as



     20 Units      00   :17                           First dose           Medic

al



                                                  (after           Branch



                                                  last           



                                                  modificati           



                                                  on) on Wed           



                                                  8/10/22 at           



                                                  1700,           



                                                  Until           



                                                  Discontinu           



                                                  ed,            



                                                  Routine           

 

     HYDROmorpho            Yes            4mg       4 mg,           Unive

rs



     ne        8-10                               Oral,           ity of



     (DILAUDID)      15:00:                               TIDPRN,           [place]

[facility]



     tablet 4 mg      00                                 Starting           Medi

sarah



                                                  on Wed           [facility]anch



                                                  8/10/22 at           



                                                  1000,           



                                                  Until           



                                                  Discontinu           



                                                  ed,            



                                                  Routine,           



                                                  Pain           



                                                  (scale           



                                                  7-10)           

 

     HYDROmorpho      2022- No             4mg       4 mg,           Univ

ers



     ne        8-10 08-11                          Oral,           ity of



     (DILAUDID)      15:00: 11:04                          TIDPRN,           [facility]

as



     tablet 4 mg      00   :00                           Starting           Medi

sarah



                                                  on Wed           Branch



                                                  8/10/22 at           



                                                  1000,           



                                                  Until Thu           



                                                  22 at           



                                                  0604,           



                                                  Routine,           



                                                  Pain           



                                                  (scale           



                                                  7-10)           

 

     insulin NPH       No             20U       20 Units,           

[facility]



     (HUMULIN N)      8-10 08-10                          Subcutaneo           i

ty of



     injection      14:00: 18:34                          us, QAM,           [facility]

as



     20 Units      00   :03                           First dose           Medic

al



                                                  (after           Branch



                                                  last           



                                                  modificati           



                                                  on) on Wed           



                                                  8/10/22 at           



                                                  0900,           



                                                  Until           



                                                  Discontinu           



                                                  ed,            



                                                  Routine           

 

     Sliding            Yes                      Subcutaneo           Univ

ers



     Scale      8-10                               us, TID           ity of



     Insulin -      13:00:                               MEALS+HS,           [facility]

as



     Lispro      00                                 First dose           Medical



     (HumaLOG) +                                         (after           Branch



     Fsbg                                         last           



     Testing                                         modificati           



                                                  on) on Wed           



                                                  8/10/22 at           



                                                  0800,           



                                                  Until           



                                                  Discontinu           



                                                  ed,            



                                                  Routine           

 

     Sliding      2022- No                       Subcutaneo           Uni

vers



     Scale      8-10 08-11                          us, TID           ity of



     Insulin -      13:00: 13:39                          MEALS+HS,           [facility]

[place]



     Lispro      00   :23                           First dose           Medical



     (HumaLOG) +                                         (after           Branch



     Fsbg                                         last           



     Testing                                         modificati           



                                                  on) on Wed           



                                                  8/10/22 at           



                                                  0800,           



                                                  Until           



                                                  Discontinu           



                                                  ed,            



                                                  Routine           

 

     FENTanyl PF      2022- No             25ug      25 mcg,           Un

[facility]



     (SUBLIMAZE      8-10 08-10                          Slow IV           ity o

f



     (PF))      03:00: 03:26                          Push,           [place]



     injection      00   :00                           ONCE, 1           Medical



     25 mcg                                         dose, On           Branch



                                                  22           



                                                  at 2200,           



                                                  Routine           

 

     Sliding      2022- No                       Subcutaneo           Uni

vers



     Scale      8-09 08-10                          us, TID           ity of



     Insulin -      22:00: 12:44                          MEALS+HS,           [facility]

[place]



     Lispro      00   :50                           First dose           Medical



     (HumaLOG) +                                         (after           Branch



     Fsbg                                         last           



     Testing                                         modificati           



                                                  on) on 22 at           



                                                  1700,           



                                                  Until           



                                                  Discontinu           



                                                  ed,            



                                                  Routine           

 

     insulin NPH      2022- No             30U       30 Units,           

[facility]



     (HUMULIN N)      8-09 08-10                          Subcutaneo           i

ty of



     injection      22:00: 12:44                          us, QPM,           [facility]

as



     30 Units      00   :50                           First dose           Medic

al



                                                  (after           Branch



                                                  last           



                                                  modificati           



                                                  on) on 22 at           



                                                  1700,           



                                                  Until           



                                                  Discontinu           



                                                  ed,            



                                                  Routine           

 

     FENTanyl PF      2022- No             25ug      25 mcg,           Un

[place]



     (SUBLIMAZE                                Slow IV           ity o

f



     (PF))      17:54: 18:30                          Push,           [place]



     injection      44   :23                           Q5MIN PRN,           Medi

sarah



     25 mcg                                         4 doses,           [facility]



                                                  Starting           



                                                  on 22 at           



                                                  1254,           



                                                  Until 22 at           



                                                  1330,           



                                                  Routine,           



                                                  Pain           



                                                  (scale           



                                                  7-10),           



                                                  PACU           

 

     ketamine      2022- No                       Intravenou           Un

[place]



     (KETALAR)                                s, ONCE           ity of



     injection      17:26: 17:56                          INTRA           [place]



               00   :00                           PROCEDURE,           Medical



                                                  Starting           Branch



                                                  on 22 at           



                                                  1226,           



                                                  Until 22 at           



                                                  1256,           



                                                  Routine,           



                                                  Intra-op           

 

     clindamycin      2022- No                       IV             Unive

rs



     in 5 %                                Piggyback,           ity of



     dextrose      17:18: 17:56                          ONCE INTRA           [facility]

[place]



     (CLEOCIN)      00   :00                           PROCEDURE,           Medi

sarah



     900 mg/50                                         Starting           Branch



     mL IV                                         on Tue           



     piggyback                                         22 at           



     RTU                                          1218,           



                                                  Until 22 at           



                                                  1256,           



                                                  Administer           



                                                  over 30           



                                                  Minutes,           



                                                  Intra-op           

 

     bupivacaine      2022- No                       PRN,           Unive

rs



     (preserv                                Starting           ity of



     free) 0.5%      17:00: 17:54                          on Tue           [place]

[facility]



     (SENSORCAIN      00   :05                           22 at           Med

ical



     E MPF) 0.5                                         1200,           Branch



     % (5 mg/mL)                                         Intra-op           



     10 mL,                                                        



     lidocaine                                                        



     1% (PF)                                                        



     (XYLOCAINE)                                                        



     10 mL                                                        

 

     phenylephri      2022- No                       Intravenou          

 [facility]



     ne                                  s, ONCE           ity of



     (VAZCULEP)      16:41: 17:56                          INTRA           [place]



     injection      00   :00                           PROCEDURE,           Medi

sar[facility]



                                                  Starting           [facility]



                                                  on 22 at           



                                                  1141,           



                                                  Until 22 at           



                                                  1256,           



                                                  Routine,           



                                                  Intra-op           

 

     dexmedeTOMI      2022- No                       Intravenou          

 [facility]



     Dine                                s, ONCE           ity of



     (PRECEDEX)      16:31: 17:56                          INTRA           [place]



     injection      00   :00                           PROCEDURE,           Medi

sarah



                                                  Starting           [facility]



                                                  on 22 at           



                                                  1131,           



                                                  Until 22 at           



                                                  1256,           



                                                  Routine,           



                                                  Intra-op           

 

     propofoL IV      2022- No                       IV             Unive

rs



     infusion                                Infusion,           ity o

f



               16:31: 17:56                          CONTINUOUS           [place]



               00   :00                           PRN,           Medical



                                                  Starting           [facility]



                                                  on 22 at           



                                                  1131,           



                                                  Until 22 at           



                                                  1256,           



                                                  Routine,           



                                                  Intra-op           

 

     FENTanyl PF      2022- No                       Epidural,           

[facility]



     (SUBLIMAZE                                ONCE INTRA           it

y of



     (PF))      16:31: 17:56                          PROCEDURE,           [place]



     injection      00   :00                           Starting           Medica

l



                                                  on Tu           Branch



                                                  22 at           



                                                  1131,           



                                                  Until 22 at           



                                                  1256,           



                                                  Routine,           



                                                  Intra-op           

 

     midazolam                             IV Push,           Uni

vers



     (VERSED)                                ONCE INTRA           ity 

of



     injection      16:31: 17:56                          PROCEDURE,           [facility]

[place]



               00   :00                           Starting           Medical



                                                  on 22 at           



                                                  1131,           



                                                  Until 22 at           



                                                  1256,           



                                                  Routine,           



                                                  Intra-op           

 

     lactated                             IV             [facility]



     ringers IV                                Infusion,           ity

 of



     infusion      16:25: 17:56                          CONTINUOUS           [facility]

[place]



               00   :00                           PRN,           Medical



                                                  Starting           [facility]



                                                  on 22 at           



                                                  1125,           



                                                  Until 22 at           



                                                  1256,           



                                                  Routine,           



                                                  Intra-op           

 

     insulin NPH                   34U       34 Units,           

[facility]



     (HUMULIN N)      8-09 08-10                          Subcutaneo           i

ty of



     injection      14:00: 12:44                          us, QAM,           [facility]

as



     34 Units      00   :50                           First dose           Medic

al



                                                  (after           [facility]anch



                                                  last           



                                                  modificati           



                                                  on) on 22 at           



                                                  0900,           



                                                  Until           



                                                  Discontinu           



                                                  ed,            



                                                  Routine           

 

     insulin                   14U       14 Units,           Univ

ers



     lispro      8-09 08-10                          Subcutaneo           ity of



     (human)      13:00: 12:44                          us, TID           [place]



     (HumaLOG      00   :50                           MEALS,           Medical



     U-100)                                         First dose           [facility]



     injection                                         (after           



     14 Units                                         last           



                                                  modificati           



                                                  on) on 22 at           



                                                  0800,           



                                                  Until           



                                                  Discontinu           



                                                  ed,            



                                                  Routine           

 

     insulin NPH                   32U       32 Units,           

[facility]



     (HUMULIN N)                                Subcutaneo           i

ty of



     injection      22:00: 12:53                          us, QPM,           [facility]

as



     32 Units      00   :44                           First dose           Medic

al



                                                  (after           [facility]



                                                  last           



                                                  modificati           



                                                  on) on 22 at           



                                                  1700,           



                                                  Until           



                                                  Discontinu           



                                                  ed,            



                                                  Routine           

 

     HYDROmorpho       No             4mg       4 mg,           Univ

ers



     ne        8-08 08-10                          Oral,           ity of



     (DILAUDID)      16:30: 14:59                          BIDPRN,           [facility]

as



     tablet 4 mg      00   :38                           Starting           Medi

sarah



                                                  on 22 at           



                                                  1130,           



                                                  Until Wed           



                                                  8/10/22 at           



                                                  0959,           



                                                  Routine,           



                                                  Pain           



                                                  (scale           



                                                  7-10)           

 

     insulin NPH                   36U       36 Units,           

[facility]



     (HUMULIN N)                                Subcutaneo           i

ty of



     injection      14:00: 12:53                          us, QAM,           [facility]

as



     36 Units      00   :44                           First dose           Medic

al



                                                  (after           [facility]



                                                  last           



                                                  modificati           



                                                  on) on 22 at           



                                                  0900,           



                                                  Until           



                                                  Discontinu           



                                                  ed,            



                                                  Routine           

 

     insulin       No             15U       15 Units,           Univ

ers



     lispro                                Subcutaneo           ity of



     (human)      13:30: 12:53                          us, TID           [place]



     (HumaLOG      00   :44                           MEALS,           Medical



     U-100)                                         First dose           [facility]



     injection                                         (after           



     15 Units                                         last           



                                                  modificati           



                                                  on) on 22 at           



                                                  0830,           



                                                  Until           



                                                  Discontinu           



                                                  ed,            



                                                  Routine           

 

     cholestyram            Yes                      Topical           Uni

vers



     ine-nystati                                     (Apply To           ity

 of



     n-zinc      13:00:                               Affected           [place]



     oxide      00                                 Areas),           Medical



     ointment                                         BID, First           [facility]

h



     1:1:1                                         dose           



     (COMPOUNDED                                         (after           



     )                                            last           



                                                  modificati           



                                                  on) on 22 at           



                                                  0800,           



                                                  Until           



                                                  Discontinu           



                                                  ed,            



                                                  Routine           

 

     cholestyram            Yes                      Topical           Uni

vers



     ine-nystati                                     (Apply To           ity

 of



     n-zinc      13:00:                               Affected           [place]



     oxide      00                                 Areas),           Medical



     ointment                                         BID, First           [facility]

h



     1:1:1                                         dose           



     (COMPOUNDED                                         (after           



     )                                            last           



                                                  modificati           



                                                  on) on 22 at           



                                                  0800,           



                                                  Until           



                                                  Discontinu           



                                                  ed,            



                                                  Routine           

 

     magnesium       No             4g        4 g, IV           Univ

ers



     sulfate in                                Piggyback,           it

y of



     water 4      12:00: 15:57                          ONCE, 1           [place]



     gram/50 mL      00   :00                           dose, On           Medic

al



     (8 %) IV                                         22           [facility]

h



     Piggyback 4                                         at 0700,           



     g                                            Routine           

 

     eravacyclin      2022- No             1mg/kg      123 mg (1         

  [facility]



     e (XERAVA)                                mg/kg ?123           it

y of



     123 mg in      03:15: 00:10                          kg), IV           [place]

[facility]



     NaCl 0.9%      00   :03                           Infusion,           Medic

al



     (NS) 250 mL                                         Q12H ABX,           [facility]

nch



     IV infusion                                         Administer           



                                                  over 60           



                                                  Minutes,           



                                                  First dose           



                                                  on Sun           



                                                  22 at           



                                                  2215, For           



                                                  7 days           

 

     insulin NPH       No             38U       38 Units,           

[facility]



     (HUMULIN N)                                Subcutaneo           i

ty of



     injection      22:00: 13:14                          us, QPM,           [facility]

as



     38 Units      00   :25                           First dose           Medic

al



                                                  (after           [facility]



                                                  last           



                                                  modificati           



                                                  on) on Sun           



                                                  22 at           



                                                  1700,           



                                                  Until           



                                                  Discontinu           



                                                  ed,            



                                                  Routine           

 

     insulin       No             18U       18 Units,           Univ

ers



     lispro                                Subcutaneo           ity of



     (human)      17:00: 13:15                          us, TID           [place]



     (HumaLOG      00   :02                           MEALS,           Medical



     U-100)                                         First dose           [facility]



     injection                                         (after           



     18 Units                                         last           



                                                  modificati           



                                                  on) on Sun           



                                                  22 at           



                                                  1200,           



                                                  Until           



                                                  Discontinu           



                                                  ed,            



                                                  Routine           

 

     insulin NPH       No             37U       37 Units,           

[facility]



     (HUMULIN N)                                Subcutaneo           i

ty of



     injection      14:00: 14:15                          us, QAM,           [facility]

as



     37 Units      00   :14                           First dose           Medic

al



                                                  (after           [facility]



                                                  last           



                                                  modificati           



                                                  on) on Sun           



                                                  22 at           



                                                  0900,           



                                                  Until           



                                                  Discontinu           



                                                  ed,            



                                                  Routine           

 

     Sliding                             Subcutaneo           Uni

vers



     Scale                                us, TID           ity of



     Insulin -      02:00: 12:53                          MEALS+HS,           [facility]

[place]



     Lispro      00   :44                           First dose           Medical



     (HumaLOG) +                                         (after           [facility]



     Fsbg                                         last           



     Testing                                         modificati           



                                                  on) on Sat           



                                                  22 at           



                                                  2100,           



                                                  Until           



                                                  Discontinu           



                                                  ed,            



                                                  Routine           

 

     HYDROmorpho       No             2mg       2 mg,           Univ

ers



     ne                                  Oral,           ity of



     (DILAUDID)      00:17: 16:23                          Q8HPRN,           [facility]

as



     tablet 2 mg      00   :26                           Starting           Medi

sar[facility]



                                                  on Sat           [facility]



                                                  22 at           



                                                  1917,           



                                                  Until Mon           



                                                  22 at           



                                                  1123,           



                                                  Routine,           



                                                  Pain           



                                                  (scale           



                                                  7-10)           

 

     insulin      2022-0 2022- No             17U       17 Units,           Univ

ers



     lispro                                Subcutaneo           ity of



     (human)      22:00: 14:15                          us, TID           [place]



     (HumaLOG      00   :14                           MEALS,           Medical



     U-100)                                         First dose           [facility]



     injection                                         (after           



     17 Units                                         last           



                                                  modificati           



                                                  on) on Sat           



                                                  22 at           



                                                  1700,           



                                                  Until           



                                                  Discontinu           



                                                  ed,            



                                                  Routine           

 

     insulin NPH      2022- No             35U       35 Units,           

[facility]



     (HUMULIN N)                                Subcutaneo           i

ty of



     injection      22:00: 13:38                          us, QPM,           [facility]

as



     35 Units      00   :53                           First dose           Medic

al



                                                  (after           [facility]



                                                  last           



                                                  modificati           



                                                  on) on Sat           



                                                  22 at           



                                                  1700,           



                                                  Until           



                                                  Discontinu           



                                                  ed,            



                                                  Routine           

 

     Sliding                             Subcutaneo           Uni

vers



     Scale                                us, Q4H,           ity of



     Insulin -      21:00: 22:06                          First dose           [facility]

[place]



     Lispro      00   :35                           (after           Medical



     (HumaLOG) +                                         last           [facility]



     Fsbg                                         modificati           



     Testing                                         on) on Sat           



                                                  22 at           



                                                  1600,           



                                                  Until           



                                                  Discontinu           



                                                  ed,            



                                                  Routine           

 

     HYDROmorpho      2022- No             4mg       4 mg,           Univ

ers



     ne                                  Oral,           ity of



     (DILAUDID)      17:24: 00:17                          Q8HPRN,           [facility]

as



     tablet 4 mg      27   :12                           Starting           Medi

[facility]



                                                  on Sat           [facility]



                                                  22 at           



                                                  1224,           



                                                  Until Sat           



                                                  22 at           



                                                  1917,           



                                                  Routine,           



                                                  Pain           



                                                  (scale           



                                                  7-10)           

 

     sodium            Yes                      Topical,           [facility]



     hypochlorit      8-06                               DAILY,           ity of



     e 0.25%      14:00:                               First dose           [place]

[facility]



     (DAKIN'S      00                                 on Sat           Medical



     SOLUTION)                                         22 at           [facility]

h



     solution                                         0900,           



                                                  Until           



                                                  Discontinu           



                                                  ed, ASAP           

 

     sodium      -      Yes                      Topical,           [facility]



     hypochlorit      8-06                               DAILY,           ity of



     e 0.25%      14:00:                               First dose           [place]

[facility]



     (DAKIN'S      00                                 on Sat           Medical



     SOLUTION)                                         22 at           [facility]

h



     solution                                         0900,           



                                                  Until           



                                                  Discontinu           



                                                  ed, ASAP           

 

     insulin      2022- No             12U       12 Units,           Univ

ers



     lispro                                Subcutaneo           ity of



     (human)      14:00: 19:55                          us, TIDPC,           [facility]

as



     (HumaLOG      00   :31                           First dose           Medic

al



     U-100)                                         (after           [facility]



     injection                                         last           



     12 Units                                         modificati           



                                                  on) on Sat           



                                                  22 at           



                                                  0900,           



                                                  Until           



                                                  Discontinu           



                                                  ed,            



                                                  Routine           

 

     insulin NPH      2022- No             26U       26 Units,           

[facility]



     (HUMULIN N)                                Subcutaneo           i

ty of



     injection      14:00: 19:55                          us, QAM,           [facility]

as



     26 Units      00   :31                           First dose           Medic

al



                                                  (after           [facility]



                                                  last           



                                                  modificati           



                                                  on) on Sat           



                                                  22 at           



                                                  0900,           



                                                  Until           



                                                  Discontinu           



                                                  ed,            



                                                  Routine           

 

     Sliding      2022- No                       Subcutaneo           Uni

vers



     Scale                                us, TID           ity of



     Insulin -      13:00: 19:55                          MEALS+HS,           [facility]

[facility]



     Lispro      00   :31                           First dose           Medical



     (HumaLOG) +                                         on Sat           [facility]



     Fsbg                                         22 at           



     Testing                                         0800,           



                                                  Until           



                                                  Discontinu           



                                                  ed,            



                                                  Routine           

 

     glucagon            Yes            1mg       1 mg,           [facility]



     (GLUCAGEN                                     Intramuscu           ity 

of



     DIAGNOSTIC      12:22:                               lar, PRN,           [facility]

[facility]



     KIT)      35                                 Starting           Medical



     injection 1                                         on Sat           [facility]



                                                22 at           



                                                  0722,           



                                                  Until           



                                                  Discontinu           



                                                  ed, ASAP,           



                                                  Blood           



                                                  Glucose           



                                                  &amp;lt;           



                                                  or = 70           



                                                  mg/dL and           



                                                  patient is           



                                                  unable to           



                                                  swallow or           



                                                  has mental           



                                                  changes.           

 

     dextrose 50      0      Yes            25mL      25 mL,           Univ

ers



     % in water                                     Slow IV           ity of



     (D50W)      12:22:                               Push, PRN,           [place]



     injection      35                                 Starting           Medica

l



     25 mL                                         on Sat           [facility]



                                                  22 at           



                                                  0722,           



                                                  Until           



                                                  Discontinu           



                                                  ed, ASAP,           



                                                  Blood           



                                                  Glucose           



                                                  &amp;lt;           



                                                  or = 70           



                                                  mg/dL and           



                                                  patient is           



                                                  unable to           



                                                  swallow or           



                                                  has mental           



                                                  status           



                                                  changes.           

 

     glucagon      0      Yes            1mg       1 mg,           [facility]



     (GLUCAGEN                                     Intramuscu           ity 

of



     DIAGNOSTIC      12:22:                               lar, PRN,           [facility]

[facility]



     KIT)      35                                 Starting           Medical



     injection 1                                         on Sat           [facility]



     mg                                           22 at           



                                                  0722,           



                                                  Until           



                                                  Discontinu           



                                                  ed, ASAP,           



                                                  Blood           



                                                  Glucose           



                                                  &amp;lt;           



                                                  or = 70           



                                                  mg/dL and           



                                                  patient is           



                                                  unable to           



                                                  swallow or           



                                                  has mental           



                                                  changes.           

 

     dextrose 50      2022-0      Yes            25mL      25 mL,           Univ

ers



     % in water                                     Slow IV           ity of



     (D50W)      12:22:                               Push, PRN,           [place]



     injection      35                                 Starting           [facility]

l



     25 mL                                         on Sat           [facility]



                                                  22 at           



                                                  0722,           



                                                  Until           



                                                  Discontinu           



                                                  ed, ASAP,           



                                                  Blood           



                                                  Glucose           



                                                  &amp;lt;           



                                                  or = 70           



                                                  mg/dL and           



                                                  patient is           



                                                  unable to           



                                                  swallow or           



                                                  has mental           



                                                  status           



                                                  changes.           

 

     HYDROmorpho      2022- No             .2mg      0.2 mg,           Un

[facility]



     ne                                  Slow IV           ity of



     (DILAUDID)      01:49: 02:17                          Push, PRN,           

[place]



     injection      34   :00                           1 dose,           Medical



     0.2 mg                                         Starting           [facility]



                                                  on 22 at           



                                                  204,           



                                                  Until 22 at           



                                                  ,           



                                                  Routine,           



                                                  Pain           



                                                  (scale           



                                                  7-10)<br>U           



                                                  se             



                                                  approved           



                                                  by             



                                                  (Faculty):           



                                                  INTENSIVE           



                                                  CARE UNIT           

 

     KCL 20      2022- No             40meq      40 mEq,           Univer

s



     mEq/15 mL                                Oral,           ity of



     solution 40      23:30: 22:58                          ONCE, 1           [facility]

[place]



     mEq       00   :00                           dose, On           Medical



                                                  22           [facility]



                                                  at 1830,           



                                                  Routine           

 

     Sliding                             Subcutaneo           Uni

vers



     Scale                                us, Q4H,           ity of



     Insulin -      22:15: 12:23                          First dose           [facility]

[place]



     Lispro      00   :48                           on Fri           Medical



     (HumaLOG) +                                         22 at           [facility]



     Fsbg                                         1715,           



     Testing                                         Until           



                                                  Discontinu           



                                                  ed,            



                                                  Routine           

 

     insulin NPH      2022- No             22U       22 Units,           

[facility]



     (HUMULIN N)                                Subcutaneo           i

ty of



     injection      22:00: 19:55                          us, QPM,           Eric

as



     22 Units      00   :31                           First dose           Medic

al



                                                  (after           [facility]



                                                  last           



                                                  modificati           



                                                  on) on 22 at           



                                                  1700,           



                                                  Until           



                                                  Discontinu           



                                                  ed,            



                                                  Routine           

 

     magnesium       No             4g        4 g, IV           Univ

ers



     sulfate in                                Piggyback,           it

y of



     water 4      19:15: 20:22                          ONCE, 1           [place]



     gram/50 mL      00   :00                           dose, On           Medic

al



     (8 %) IV                                         Fri 22           [facility]

h



     Piggyback 4                                         at 1415,           



     g                                            Routine           

 

     HYDROmorpho      2022- No             .2mg      0.2 mg,           Un

[place]



     ne                                  Slow IV           ity of



     (DILAUDID)      18:00: 18:07                          Push,           [place]



     injection      00   :00                           ONCE, 1           Medical



     0.2 mg                                         dose, On           [facility]



                                                  Fri 22           



                                                  at 1300,           



                                                  Routine<br           



                                                  >Use           



                                                  approved           



                                                  by             



                                                  (Faculty):           



                                                  INTENSIVE           



                                                  CARE UNIT           

 

     insulin                   6U        6 Units,           Unive

rs



     lispro                                Subcutaneo           ity of



     (human)      14:00: 00:41                          us, TIDPC,           Eric

as



     (HumaLOG      00   :23                           First dose           Medic

al



     U-100)                                         (after           [facility]



     injection 6                                         last           



     Units                                         modificati           



                                                  on) on 22 at           



                                                  0900,           



                                                  Until           



                                                  Discontinu           



                                                  ed,            



                                                  Routine           

 

     insulin NPH       No             22U       22 Units,           

[facility]



     (HUMULIN N)                                Subcutaneo           i

ty of



     injection      14:00: 21:37                          us, QAM,           Eric

as



     22 Units      00   :09                           First dose           Medic

al



                                                  on Fri           [facility]



                                                  22 at           



                                                  0900,           



                                                  Until           



                                                  Discontinu           



                                                  ed,            



                                                  Routine           

 

     HYDROmorpho       No             .2mg      0.2 mg,           Un

[place]



     ne                                  Slow IV           ity of



     (DILAUDID)      08:15: 07:57                          Push,           [place]



     injection      00   :00                           ONCE, 1           Medical



     0.2 mg                                         dose, On           [facility]



                                                  22           



                                                  at 0315,           



                                                  Routine<br           



                                                  >Use           



                                                  approved           



                                                  by             



                                                  (Faculty):           



                                                  INTENSIVE           



                                                  CARE UNIT           

 

     HYDROcodone      2022- No             1{tbl}      1 tablet,         

  [facility]



     -acetaminop                                Oral,           ity of



     hen (NORCO)      01:32: 17:25                          Q6HPRN,           [facility]

[place]



      mg      23   :31                           Starting           Medica

l



     tablet 1                                         on Thu           [facility]



     tablet                                         22 at           



                                                  2032,           



                                                  Until Sat           



                                                  22 at           



                                                  1225,           



                                                  Routine,           



                                                  Pain           



                                                  (scale           



                                                  7-10)           

 

     Sliding      2022- No                       Subcutaneo           Uni

vers



     Scale                                us, Q4H,           ity of



     Insulin -      01:00: 21:37                          First dose           T

[place]



     lispro      00   :09                           on Thu           Medical



     (humaLOG) +                                         22 at           [facility]



     Fsbg                                         ,           



     Testing                                         Until           



                                                  Discontinu           



                                                  ed,            



                                                  Routine           

 

     meropenem      2022- No             1000mg      1,000 mg,           

[facility]



     (MERREM)                                IV             ity of



     1,000 mg in      00:15: 02:14                          [facility],          

 [place]



     NaCl 0.9%      00   :44                           Q8H ABX,           Medica

l



     (NS) 50 mL                                         21 doses,           [facility]

ch



     MINI-BAG                                         First dose           



                                                  on u           



                                                  22 at           



                                                  1915, Last           



                                                  dose on           



                                                  u            



                                                  22 at           



                                                  1115,           



                                                  Administer           



                                                  over 3           



                                                  Hours, 50           



                                                  mL<br>Rest           



                                                  ricted use           



                                                  approved           



                                                  by: [facility]           



                                                  8TH            



                                                  FLOOR<br>R           



                                                  [place] for           



                                                  Anti-Infec           



                                                  tive:           



                                                  Documented           



                                                  Infection<           



                                                  br>Documen           



                                                  huma            



                                                  Infection           



                                                  Site:           



                                                  Urine<br>D           



                                                  uration of           



                                                  Therapy: 7           



                                                  days           

 

     HYDROmorpho      2022- No             .2mg      0.2 mg,           Un

[place]



     ne                                  Slow IV           ity of



     (DILAUDID)      00:00: 23:13                          Push,           [place]



     injection      00   :00                           ONCE, 1           Medical



     0.2 mg                                         dose, On           [facility]



                                                  Thu 22           



                                                  at 1900,           



                                                  Routine<br           



                                                  >Use           



                                                  approved           



                                                  by             



                                                  (Faculty):           



                                                  INTENSIVE           



                                                  CARE UNIT           

 

     HYDROmorpho      2022- No             .2mg      0.2 mg,           Un

[place]



     ne                                  Slow IV           ity of



     (DILAUDID)      19:30: 18:59                          Push,           [place]



     injection      00   :00                           ONCE, 1           Medical



     0.2 mg                                         dose, On           [facility]



                                                  Thu 22           



                                                  at 1430,           



                                                  Routine<br           



                                                  >Use           



                                                  approved           



                                                  by             



                                                  (Faculty):           



                                                  INTENSIVE           



                                                  CARE UNIT           

 

     cholestyram      2022- No                       Topical           Un

[facility]



     ine-nystati                                (Apply To           it

y of



     n-zinc      18:29: 12:46                          Affected           [place]



     oxide      12   :35                           Areas),           Medical



     ointment                                         PRN,           [facility]



     1:1:1                                         Starting           



     (COMPOUNDED                                         on Thu           



     )                                            22 at           



                                                  1329,           



                                                  Until Mon           



                                                  22 at           



                                                  0746,           



                                                  Routine,           



                                                  Wound           



                                                  care,           



                                                  Cuts,           



                                                  abrasions,           



                                                  and skin           



                                                  ulcers           

 

     iopamidol      2022- No        [id number] 60mL      60 mL,           

[facility]



     (ISOVUE                                Intravenou           ity o

f



     370-500 mL)      17:25: 05:25                          s, ONCE, 1          

 [place]



     injection      00   :00                           dose, On           Medica

l



     60 mL                                         Thu 22           [facility]



                                                  at 1230,           



                                                  Routine           

 

     meropenem      2022- No             1000mg      1,000 mg,           

[facility]



     (MERREM)                                IV             ity of



     1,000 mg in      16:45: 20:14                          Piggyback,          

 [place]



     NaCl 0.9%      00   :00                           ONCE, 1           Medical



     (NS) 50 mL                                         dose, On           [facility]

h



     MINI-BAG                                         Thu 22           



                                                  at 1145,           



                                                  Administer           



                                                  over 30           



                                                  Minutes,           



                                                  50             



                                                  mL<br&gt;R           



                                                  estricted           



                                                  use            



                                                  approved           



                                                  by: [facility]           



                                                  8TH            



                                                  FLOOR<br>R           



                                                  [place] for           



                                                  Anti-Infec           



                                                  tive:           



                                                  Documented           



                                                  Infection<           



                                                  br>Documen           



                                                  huma            



                                                  Infection           



                                                  Site:           



                                                  Urine<br>D           



                                                  uration of           



                                                  Therapy: 7           



                                                  days           

 

     pantoprazol            Yes            40mg      40 mg,           Univ

ers



     e         8                               Oral,           ity of



     (PROTONIX)      14:00:                               DAILY,           [place]



     EC tablet      00                                 First dose           Medi

sar[facility]



     40 mg                                         on Thu           Branch



                                                  22 at           



                                                  0900,           



                                                  Until           



                                                  Discontinu           



                                                  ed,            



                                                  Routine<br           



                                                  >Indicatio           



                                                  n for use:           



                                                  None of           



                                                  the above           

 

     pantoprazol            Yes            40mg      40 mg,           Univ

ers



     e                                        Oral,           ity of



     (PROTONIX)      14:00:                               DAILY,           [place]



     EC tablet      00                                 First dose           Medi

sarah



     40 mg                                         on Thu           Branch



                                                  22 at           



                                                  0900,           



                                                  Until           



                                                  Discontinu           



                                                  ed,            



                                                  Routine<br           



                                                  >Indicatio           



                                                  n for use:           



                                                  None of           



                                                  the above           

 

     heparin            Yes            5000U      5,000           [facility]



     (porcine)                                     Units,           ity of



     injection      13:00:                               Subcutaneo           Te

[place]



     5,000 Units      00                                 us, Q12H,           Med

ical



                                                  First dose           Branch



                                                  on u           



                                                  22 at           



                                                  0800,           



                                                  Until           



                                                  Discontinu           



                                                  ed,            



                                                  Routine           

 

     heparin      2022- No             5000U      5,000           [facility]



     (porcine)       08-10                          Units,           ity of



     injection      13:00: 21:57                          Subcutaneo           T

[place]



     5,000 Units      00   :33                           us, Q12H,           Med

ical



                                                  First dose           [facility]



                                                  on Thu           



                                                  22 at           



                                                  0800,           



                                                  Until           



                                                  Discontinu           



                                                  ed,            



                                                  Routine           

 

     NaCl 0.9%      -0      Yes            10mL      10 mL,           Univer

s



     (NS)                                     Slow IV           ity of



     injection      11:28:                               Push, PRN,           Te

[place]



     10 mL      11                                 Starting           Medical



                                                  on Thu           [facility]



                                                  22 at           



                                                  0628,           



                                                  Until           



                                                  Discontinu           



                                                  ed,            



                                                  Routine,           



                                                  line           



                                                  maintenanc           



                                                  e              

 

     lidocaine      -0      Yes            5mL       5 mL,           [facility]



     1% (PF)                                     Subcutaneo           ity of



     (XYLOCAINE)      11:28:                               us, PRN,           Te

[place]



     injection 5      11                                 Starting           Medi

sarah



     mL                                           on Thu           Branch



                                                  22 at           



                                                  0628,           



                                                  Until           



                                                  Discontinu           



                                                  ed,            



                                                  Routine,           



                                                  Local           



                                                  anesthesia           

 

     NaCl 0.9%      -0      Yes            10mL      10 mL,           Un[facility]

s



     (NS)                                     Slow IV           ity of



     injection      11:28:                               Push, PRN,           [facility]

[place]



     10 mL      11                                 Starting           Medical



                                                  on u           [facility]



                                                  22 at           



                                                  0628,           



                                                  Until           



                                                  Discontinu           



                                                  ed,            



                                                  Routine,           



                                                  line           



                                                  maintenanc           



                                                  e              

 

     lidocaine      -0      Yes            5mL       5 mL,           [facility]



     1% (PF)                                     Subcutaneo           ity of



     (XYLOCAINE)      11:28:                               us, PRN,           [facility]

[place]



     injection 5      11                                 Starting           Medi

sarah



     mL                                           on Thu           [facility]



                                                  22 at           



                                                  0628,           



                                                  Until           



                                                  Discontinu           



                                                  ed,            



                                                  Routine,           



                                                  Local           



                                                  anesthesia           

 

     NaCl 0.9%      2022- No             1000mL      at 999           Uni

vers



     (NS) bolus                                mL/hr,           ity of



     infusion      07:30: 06:55                          1,000 mL,           [facility]

as



     1,000 mL      00   :00                           IV             Medical



                                                  Infusion,           [facility]



                                                  ONCE, 1           



                                                  dose, On           



                                                  u 22           



                                                  at 0230,           



                                                  ASAP           

 

     FENTanyl PF      -2022- No             50ug      50 mcg,           Un

[facility]



     (SUBLIMAZE                                Slow IV           ity o

f



     (PF))      06:45: 06:01                          Push,           [place]



     injection      00   :00                           ONCE, 1           Medical



     50 mcg                                         dose, On           [facility]



                                                  u 22           



                                                  at 0145,           



                                                  Routine           

 

     cefTRIAXone      -0 - No             1000mg      1,000 mg,         

  [facility]



     (ROCEPHIN)                                IV             ity of



     1,000 mg in      05:30: 06:00                          Piggyback,          

 [place]



     NaCl 0.9%      00   :00                           ONCE, 1           Medical



     (NS) 50 mL                                         dose, On           Branc

h



     MINI-BAG                                         u 22           



                                                  at 0030,           



                                                  Administer           



                                                  over 30           



                                                  Minutes,           



                                                  50             



                                                  mL<br&gt;R           



                                                  elmira for           



                                                  Anti-Infec           



                                                  tive:           



                                                  Empiric           



                                                  Therapy           



                                                  for            



                                                  Suspected           



                                                  Infection<           



                                                  br>Empiric           



                                                  Therapy           



                                                  Site:           



                                                  Urine<br>D           



                                                  uration of           



                                                  therapy:           



                                                  72 hours           

 

     NaCl 0.9%      2022- No             1000mL      at 999           Uni

vers



     (NS) bolus                                mL/hr,           ity of



     infusion      05:30: 07:58                          1,000 mL,           [facility]

as



     1,000 mL      00   :00                           IV             Medical



                                                  Infusion,           [facility]



                                                  ONCE, 1           



                                                  dose, On           



                                                  Thu 22           



                                                  at 0030,           



                                                  ASAP           

 

     NaCl 0.9%      0 - No             1000mL      at 999           Uni

vers



     (NS) bolus                                mL/hr,           ity of



     infusion      05:15: 07:58                          1,000 mL,           [facility]

as



     1,000 mL      00   :00                           IV             Medical



                                                  Infusion,           [facility]



                                                  ONCE, 1           



                                                  dose, On           



                                                  Thu 22           



                                                  at 0015,           



                                                  ASAP           

 

     D5W 0.45%      2022- No                       IV             [facility]



     NaCl                                Infusion,           ity of



     (1/2NS) 1 L      04:53: 22:22                          at 200           [facility]

as



     + KCL 20      49   :19                           mL/hr, PRN           Medic

al



     mEq                                          - SEE           [facility]



                                                  INSTRUCTIO           



                                                  NS,            



                                                  Starting           



                                                  on Wed           



                                                  8/3/22 at           



                                                  2353,           



                                                  Until 22 at           



                                                  1722,           



                                                  ASAP,           



                                                  Blood           



                                                  glucose           



                                                  control           

 

     proMETHazin      2022- No             25mg      25 mg, IV           

[facility]



     e                                   Piggyback,           ity of



     (PHENERGAN)      04:15: 04:22                          ONCE, 1           Te

[facility]



     25 mg in      00   :00                           dose, On           Medical



     NaCl 0.9%                                         Wed 8/3/22           [facility]

ch



     (NS) 50 mL                                         at 2315,           



     IV                                           ASAP           



     piggyback                                                        

 

     proMETHazin      2022- No             12.5mg      12.5 mg,          

 [facility]



     e                                   IV             ity of



     (PHENERGAN)      21:30: 22:14                          Piggyback,          

 [place]



     12.5 mg in      00   :00                           ONCE, 1           Medica

l



     NaCl 0.9%                                         dose, On           [facility]



     (NS) 50 mL                                         Sat            



     IV                                           22 at           



     piggyback                                         1630, 50           



                                                  mL             

 

     lactated            Yes            1000mL      at 50           Univer

s



     ringers IV      7-30                               mL/hr,           ity of



     infusion      19:15:                               1,000 mL,           [place]

[place]



     1,000 mL      00                                 IV             Medical



                                                  Infusion,           [facility]



                                                  CONTINUOUS           



                                                  , Starting           



                                                  on Sat           



                                                  22 at           



                                                  1415,           



                                                  Until           



                                                  Discontinu           



                                                  ed,            



                                                  Routine           

 

     insulin            Yes            7U        7 Units,           Univer

s



     lispro      7-30                               Subcutaneo           ity of



     (human)      17:00:                               us, TID           [place]



     (HumaLOG      00                                 MEALS,           Medical



     U-100)                                         First dose           [facility]



     injection 7                                         (after           



     Units                                         last           



                                                  modificati           



                                                  on) on Sat           



                                                  22 at           



                                                  1200,           



                                                  Until           



                                                  Discontinu           



                                                  ed,            



                                                  Routine           

 

     insulin            Yes            40U       40 Units,           Unive

rs



     glargine      7-30                               Subcutaneo           ity o

f



     (LANTUS      14:00:                               us, DAILY,           [place]

[place]



     U-100)      00                                 First dose           Medical



     injection                                         (after           [facility]



     40 Units                                         last           



                                                  modificati           



                                                  on) on Sat           



                                                  22 at           



                                                  0900,           



                                                  Until           



                                                  Discontinu           



                                                  ed,            



                                                  Routine           

 

     insulin NPH      2022- No        [id number] 50U       inject 50      

     [facility]



     and regular      7-30 08-30                          Units           ity of



     human 70-30      00:00: 04:59                          under the           

[place]



     100 unit/mL      00   :00                           skin every           Me

dical



     (70-30)                                         morning           [facility]



     injection                                         and            



                                                  evening           



                                                  for 30           



                                                  days.           

 

     proMETHazin      2022- No        [id number] 12.5mg      Insert 1     

      [facility]



     e 12.5 mg      7- 08-30                          Suppositor           ity

 of



     suppository      00:00: 04:59                          y into           [facility]

as



               00   :00                           rectum           Medical



                                                  every 6           [facility]



                                                  (six)           



                                                  hours as           



                                                  needed for           



                                                  Nausea and           



                                                  Vomiting           



                                                  (N/V) for           



                                                  up to 30           



                                                  days.           

 

     proMETHazin      2022- No        [id number] 12.5mg      Take 0.5     

      [facility]



     e 25 mg      7-30 08-30                          tablets by           ity o

f



     tablet      00:00: 04:59                          mouth           [place]



               00   :00                           every 4           Medical



                                                  (four)           [facility]



                                                  hours as           



                                                  needed for           



                                                  Nausea and           



                                                  Vomiting           



                                                  (N/V) for           



                                                  up to 30           



                                                  days.           

 

     insulin NPH      2022- No        [id number] 50U       inject 50      

     [facility]



     and regular      7-30 08-30                          Units           ity of



     human 70-30      00:00: 04:59                          under the           

[place]



     100 unit/mL      00   :00                           skin every           Me

dical



     (70-30)                                         morning           [facility]



     injection                                         and            



                                                  evening           



                                                  for 30           



                                                  days.           

 

     proMETHazin      2022- No        [id number] 12.5mg      Insert 1     

      [facility]



     e 12.5 mg      7-30 08-30                          Suppositor           ity

 of



     suppository      00:00: 04:59                          y into           [facility]

as



               00   :00                           rectum           Medical



                                                  every 6           [facility]



                                                  (six)           



                                                  hours as           



                                                  needed for           



                                                  Nausea and           



                                                  Vomiting           



                                                  (N/V) for           



                                                  up to 30           



                                                  days.           

 

     proMETHazin      2022- No        [id number] 12.5mg      Take 0.5     

      [facility]



     e 25 mg      7-30 08-30                          tablets by           ity o

f



     tablet      00:00: 04:59                          mouth           [place]



               00   :00                           every 4           Medical



                                                  (four)           [facility]



                                                  hours as           



                                                  needed for           



                                                  Nausea and           



                                                  Vomiting           



                                                  (N/V) for           



                                                  up to 30           



                                                  days.           

 

     insulin NPH      2022- No        [id number] 50U       inject 50      

     [facility]



     and regular      7-30 08-30                          Units           ity of



     human 70-30      00:00: 04:59                          under the           

[place]



     100 unit/mL      00   :00                           skin every           Me

dical



     (70-30)                                         morning           [facility]



     injection                                         and            



                                                  evening           



                                                  for 30           



                                                  days.           

 

     proMETHazin      2022- No        [id number] 12.5mg      Insert 1     

      [facility]



     e 12.5 mg      7-30 08-30                          Suppositor           ity

 of



     suppository      00:00: 04:59                          y into           [facility]

as



               00   :00                           rectum           Medical



                                                  every 6           [facility]



                                                  (six)           



                                                  hours as           



                                                  needed for           



                                                  Nausea and           



                                                  Vomiting           



                                                  (N/V) for           



                                                  up to 30           



                                                  days.           

 

     proMETHazin      2022- No        [id number] 12.5mg      Take 0.5     

      [facility]



     e 25 mg      7-30 08-30                          tablets by           ity o

f



     tablet      00:00: 04:59                          mouth           [place]



               00   :00                           every 4           Medical



                                                  (four)           [facility]



                                                  hours as           



                                                  needed for           



                                                  Nausea and           



                                                  Vomiting           



                                                  (N/V) for           



                                                  up to 30           



                                                  days.           

 

     insulin NPH      2022- No        [id number] 50U       inject 50      

     [facility]



     and regular      7-30 08-30                          Units           ity of



     human 70-30      00:00: 04:59                          under the           

[place]



     100 unit/mL      00   :00                           skin every           Me

dical



     (70-30)                                         morning           [facility]



     injection                                         and            



                                                  evening           



                                                  for 30           



                                                  days.           

 

     proMETHazin      2022- No        [id number] 12.5mg      Insert 1     

      [facility]



     e 12.5 mg      7-30 08-30                          Suppositor           ity

 of



     suppository      00:00: 04:59                          y into           [facility]

as



               00   :00                           rectum           Medical



                                                  every 6           [facility]



                                                  (six)           



                                                  hours as           



                                                  needed for           



                                                  Nausea and           



                                                  Vomiting           



                                                  (N/V) for           



                                                  up to 30           



                                                  days.           

 

     proMETHazin      2022- No        [id number] 12.5mg      Take 0.5     

      [facility]



     e 25 mg      7-30 08-30                          tablets by           ity o

f



     tablet      00:00: 04:59                          mouth           [place]



               00   :00                           every 4           Medical



                                                  (four)           [facility]



                                                  hours as           



                                                  needed for           



                                                  Nausea and           



                                                  Vomiting           



                                                  (N/V) for           



                                                  up to 30           



                                                  days.           

 

     insulin NPH      2022- No        [id number] 50U       inject 50      

     [facility]



     and regular      7-30 08-30                          Units           ity of



     human 70-30      00:00: 04:59                          under the           

[place]



     100 unit/mL      00   :00                           skin every           Me

dical



     (70-30)                                         morning           [facility]



     injection                                         and            



                                                  evening           



                                                  for 30           



                                                  days.           

 

     proMETHazin      2022- No        [id number] 12.5mg      Insert 1     

      [facility]



     e 12.5 mg      7-30 08-30                          Suppositor           ity

 of



     suppository      00:00: 04:59                          y into           [facility]

as



               00   :00                           rectum           Medical



                                                  every 6           Branch



                                                  (six)           



                                                  hours as           



                                                  needed for           



                                                  Nausea and           



                                                  Vomiting           



                                                  (N/V) for           



                                                  up to 30           



                                                  days.           

 

     proMETHazin      2022- No        [id number] 12.5mg      Take 0.5     

      [facility]



     e 25 mg      7-30 08-30                          tablets by           ity o

f



     tablet      00:00: 04:59                          mouth           [place]



               00   :00                           every 4           Medical



                                                  (four)           Branch



                                                  hours as           



                                                  needed for           



                                                  Nausea and           



                                                  Vomiting           



                                                  (N/V) for           



                                                  up to 30           



                                                  days.           

 

     proMETHazin      2022- No        [id number] 12.5mg      Insert 1     

      [facility]



     e 12.5 mg      7-30 08-30                          Suppositor           ity

 of



     suppository      00:00: 04:59                          y into           [facility]

as



               00   :00                           rectum           Medical



                                                  every 6           Branch



                                                  (six)           



                                                  hours as           



                                                  needed for           



                                                  Nausea and           



                                                  Vomiting           



                                                  (N/V) for           



                                                  up to 30           



                                                  days.           

 

     proMETHazin      2022- No        [id number] 12.5mg      Take 0.5     

      [facility]



     e 25 mg      7-30 08-30                          tablets by           ity o

f



     tablet      00:00: 04:59                          mouth           [place]



               00   :00                           every 4           Medical



                                                  (four)           Branch



                                                  hours as           



                                                  needed for           



                                                  Nausea and           



                                                  Vomiting           



                                                  (N/V) for           



                                                  up to 30           



                                                  days.           

 

     proMETHazin      2022- No        [id number] 12.5mg      Insert 1     

      [facility]



     e 12.5 mg      7-30 08-30                          Suppositor           ity

 of



     suppository      00:00: 04:59                          y into           [facility]

as



               00   :00                           rectum           Medical



                                                  every 6           Branch



                                                  (six)           



                                                  hours as           



                                                  needed for           



                                                  Nausea and           



                                                  Vomiting           



                                                  (N/V) for           



                                                  up to 30           



                                                  days.           

 

     proMETHazin      2022- No        [id number] 12.5mg      Take 0.5     

      [facility]



     e 25 mg      7-30 08-30                          tablets by           ity o

f



     tablet      00:00: 04:59                          mouth           [place]



               00   :00                           every 4           Medical



                                                  (four)           Branch



                                                  hours as           



                                                  needed for           



                                                  Nausea and           



                                                  Vomiting           



                                                  (N/V) for           



                                                  up to 30           



                                                  days.           

 

     proMETHazin      2022- No        [id number] 12.5mg      Insert 1     

      [facility]



     e 12.5 mg      7-30 08-30                          Suppositor           ity

 of



     suppository      00:00: 04:59                          y into           [facility]

as



               00   :00                           rectum           Medical



                                                  every 6           Branch



                                                  (six)           



                                                  hours as           



                                                  needed for           



                                                  Nausea and           



                                                  Vomiting           



                                                  (N/V) for           



                                                  up to 30           



                                                  days.           

 

     proMETHazin      2022- No        [id number] 12.5mg      Take 0.5     

      [facility]



     e 25 mg      7-30 08-30                          tablets by           ity o

f



     tablet      00:00: 04:59                          mouth           [place]



               00   :00                           every 4           Medical



                                                  (four)           Branch



                                                  hours as           



                                                  needed for           



                                                  Nausea and           



                                                  Vomiting           



                                                  (N/V) for           



                                                  up to 30           



                                                  days.           

 

     proMETHazin      2022- No        [id number] 12.5mg      Insert 1     

      [facility]



     e 12.5 mg      7-30 08-30                          Suppositor           ity

 of



     suppository      00:00: 04:59                          y into           [facility]

as



               00   :00                           rectum           Medical



                                                  every 6           Branch



                                                  (six)           



                                                  hours as           



                                                  needed for           



                                                  Nausea and           



                                                  Vomiting           



                                                  (N/V) for           



                                                  up to 30           



                                                  days.           

 

     proMETHazin      2022- No        [id number] 12.5mg      Take 0.5     

      [facility]



     e 25 mg       0830                          tablets by           ity o

f



     tablet      00:00: 04:59                          mouth           [place]



               00   :00                           every 4           Medical



                                                  (four)           Branch



                                                  hours as           



                                                  needed for           



                                                  Nausea and           



                                                  Vomiting           



                                                  (N/V) for           



                                                  up to 30           



                                                  days.           

 

     insulin NPH      2022- No        [id number] 50U       inject 50      

     [facility]



     and regular       08-12                          Units           ity of



     human 70-30      00:00: 00:00                          under the           

[place]



     100 unit/mL      00   :00                           skin every           Me

dical



     (70-30)                                         morning           Branch



     injection                                         and            



                                                  evening           



                                                  for 30           



                                                  days.           

 

     insulin      2022- No             5U        5 Units,           Unive

rs



     lispro                                Subcutaneo           ity of



     (human)      17:00: 14:45                          us, TID           [place]



     (HumaLOG      00   :04                           MEALS,           Medical



     U-100)                                         First dose           Branch



     injection 5                                         (after           



     Units                                         last           



                                                  modificati           



                                                  on) on 22 at           



                                                  1200,           



                                                  Until           



                                                  Discontinu           



                                                  ed,            



                                                  Routine           

 

     HYDROmorphO      2022- No             1mg       1 mg,           Univ

ers



     ne                                  Intravenou           ity of



     (DILAUDID)      16:54: 16:53                          s, Q6HPRN,           

[place]



     injection 1      28   :28                           Starting           Medi

[facility]



     mg                                           on Fri           Branch



                                                  22 at           



                                                  1154,           



                                                  Until Sun           



                                                  22 at           



                                                  1153,           



                                                  Routine,           



                                                  Breakthrou           



                                                  gh pain           



                                                  only<br>Us           



                                                  e approved           



                                                  by             



                                                  (Faculty):           



                                                  ADC            



                                                  PROVIDER           

 

     insulin      2022- No             35U       35 Units,           Univ

ers



     glargine                                Subcutaneo           ity 

of



     (LANTUS      14:00: 14:02                          us, DAILY,           [facility]

as



     U-100)      00   :21                           First dose           Medical



     injection                                         (after           Branch



     35 Units                                         last           



                                                  modificati           



                                                  on) on 22 at           



                                                  0900,           



                                                  Until           



                                                  Discontinu           



                                                  ed,            



                                                  Routine           

 

     NaCl 0.9%            Yes            10mL      10 mL,           Univer

s



     (NS)                                     Slow IV           ity of



     injection      01:04:                               Push, PRN,           [place]

[place]



     10 mL      34                                 Starting           Medical



                                                  on Thu           Branch



                                                  22 at           



                                                  2004,           



                                                  Until           



                                                  Discontinu           



                                                  ed,            



                                                  Routine,           



                                                  line           



                                                  maintenanc           



                                                  e              

 

     lidocaine            Yes            5mL       5 mL,           [facility]



     1% (PF)                                     Subcutaneo           ity of



     (XYLOCAINE)      01:04:                               us, PRN,           [facility]

[place]



     injection 5      34                                 Starting           Medi

[facility]



     mL                                           on Thu           Branch



                                                  22 at           



                                                  2004,           



                                                  Until           



                                                  Discontinu           



                                                  ed,            



                                                  Routine,           



                                                  Local           



                                                  anesthesia           

 

     nystatin      0      Yes                      Topical,           Unive

rs



     (NYSTOP)                                     BID, First           ity o

f



     powder      01:00:                               dose on           [place]



               00                                 Thu            Medical



                                                  22 at           Branch



                                                  2000,           



                                                  Until           



                                                  Discontinu           



                                                  ed,            



                                                  Routine           

 

     Sliding            Yes                      Subcutaneo           Univ

ers



     Scale      -28                               us, TID           ity of



     Insulin -      22:00:                               MEALS+HS,           [facility]

as



     Lispro      00                                 First dose           Medical



     (HumaLOG) +                                         (after           Branch



     Fsbg                                         last           



     Testing                                         modificati           



                                                  on) on Thu           



                                                  22 at           



                                                  1700,           



                                                  Until           



                                                  Discontinu           



                                                  ed,            



                                                  Routine           

 

     insulin      0 - No             3U        3 Units,           Unive

rs



     lispro       07                          Subcutaneo           ity of



     (human)      22:00: 14:02                          , TID           [place]



     (HumaLOG      00   :21                           MEALS,           Medical



     U-100)                                         First dose           Branch



     injection 3                                         on Thu           



     Units                                         22 at           



                                                  1700,           



                                                  Until           



                                                  Discontinu           



                                                  ed,            



                                                  Routine           

 

     mupirocin            Yes                                     [facility]



     (BACTROBAN                                                    ity of



     OINT) 2 %      21:45:                                              [place]



     skin      00                                                Medical



     ointment                                                        Branch

 

     collagenase            Yes                      Topical           Uni

vers



     (SANTYL)                                     (Apply To           ity of



     ointment      21:45:                               Affected           [place]



               00                                 Areas),           Medical



                                                  DAILY,           Branch



                                                  First dose           



                                                  (after           



                                                  last           



                                                  modificati           



                                                  on) on Thu           



                                                  22 at           



                                                  1645,           



                                                  Until           



                                                  Discontinu           



                                                  ed,            



                                                  Routine           

 

     HYDROcodone      0      Yes            1{tbl}      1 tablet,          

 [facility]



     -acetaminop                                     Oral,           ity of



     hen (NORCO      19:54:                               Q6HPRN,           [place]

[place]



     5) 5-325 mg      07                                 Starting           Medi

[facility]



     tablet 1                                         on Thu           [facility]anch



     tablet                                         22 at           



                                                  1454,           



                                                  Until           



                                                  Discontinu           



                                                  ed,            



                                                  Routine,           



                                                  Pain           



                                                  (scale           



                                                  4-6)           

 

     HYDROcodone      0      Yes            1{tbl}      1 tablet,          

 [facility]



     -acetaminop                                     Oral,           ity of



     hen (NORCO)      19:47:                               Q6HPRN,           [facility]

as



      mg      33                                 Starting           Medica

l



     tablet 1                                         on Thu           Branch



     tablet                                         22 at           



                                                  1447,           



                                                  Until           



                                                  Discontinu           



                                                  ed,            



                                                  Routine,           



                                                  Pain           



                                                  (scale           



                                                  7-10)           

 

     lactated      2022- No             1000mL      at 100           Univ

ers



     ringers IV       07-30                          mL/hr,           ity of



     infusion      18:30: 19:10                          1,000 mL,           [facility]

as



     1,000 mL      00   :52                           IV             Medical



                                                  Infusion,           Branch



                                                  CONTINUOUS           



                                                  , Starting           



                                                  on Thu           



                                                  22 at           



                                                  1330,           



                                                  Until Sat           



                                                  22 at           



                                                  1410,           



                                                  Routine           

 

     fluconazole       No             200mg      200 mg,           U

nivers



     (DIFLUCAN)                                Oral,           ity of



     tablet 200      14:00: 19:47                          DAILY,           [place]

[place]



     mg        00   :17                           First dose           Medical



                                                  on Thu           Branch



                                                  22 at           



                                                  0900,           



                                                  Until           



                                                  Discontinu           



                                                  ed,            



                                                  ASAP<br>Re           



                                                  ason for           



                                                  Anti-Infec           



                                                  tive:           



                                                  [facility]           



                                                  Therapy           



                                                  for            



                                                  Suspected           



                                                  Infection<           



                                                  br>[facility]           



                                                  Therapy           



                                                  Site:           



                                                  Urine<br&g           



                                                  t;Duration           



                                                  of             



                                                  therapy:           



                                                  72 hours           

 

     NaCl 0.45%       No             1000mL      at 150           Un

[facility]



     (1/2NS) IV                                mL/hr,           ity of



     infusion      02:15: 13:47                          1,000 mL,           Eric

as



     1,000 mL      00   :19                           IV             Medical



                                                  Infusion,           Branch



                                                  CONTINUOUS           



                                                  , Starting           



                                                  on Wed           



                                                  22 at           



                                                  2115,           



                                                  Until Thu           



                                                  22 at           



                                                  0847,           



                                                  Routine           

 

     proMETHazin       No             12.5mg      12.5 mg,          

 [facility]



     e                                   IV             ity of



     (PHENERGAN)      02:15: 01:35                          [place], [place]



     12.5 mg in      00   :00                           ONCE, 1           Medica

l



     NaCl 0.9%                                         dose, On           Branch



     (NS) 50 mL                                         Wed            



     IV                                           22 at           



     piggyback                                         211,           



                                                  Routine           

 

     insulin      2022- No             10U       10 Units,           Univ

ers



     lispro                                Subcutaneo           ity of



     (human)      01:30: 00:44                          , ONCE,           [place]

[place]



     (HumaLOG      00   :00                           1 dose, On           Medic

al



     U-100)                                         Wed            Branch



     injection                                         22 at           



     10 Units                                         2030, ASAP           

 

     ertapenem      2022- No             1000mg      1,000 mg,           

[facility]



     (INVANZ)                                Intramuscu           ity 

of



     injection      01:30: 18:10                          lar, Q24H           [facility]

[place]



     1,000 mg      00   :43                           ABX, First           Medic

al



                                                  dose on           Branch



                                                  Wed            



                                                  22 at           



                                                  2030,           



                                                  Until           



                                                  Discontinu           



                                                  ed,            



                                                  ASAP<br&gt           



                                                  ;Reason           



                                                  for            



                                                  Anti-Infec           



                                                  tive:           



                                                  [facility]           



                                                  Therapy           



                                                  for            



                                                  Suspected           



                                                  Infection<           



                                                  br>[facility]           



                                                  Therapy           



                                                  Site:           



                                                  Urine<br>D           



                                                  uration of           



                                                  therapy:           



                                                  72             



                                                  hours<br>R           



                                                  estricted           



                                                  use            



                                                  approved           



                                                  by:            



                                                  History of           



                                                  ESBL           



                                                  infection           



                                                  in past 3           



                                                  months           

 

     HYDROmorphO      2022- No             1mg       1 mg,           Univ

ers



     ne                                  Intravenou           ity of



     (DILAUDID)      01:01: 19:45                          s, Q4HPRN,           

[place]



     injection 1      21   :38                           Starting           Medi

sarah



     mg                                           on Wed           Branch



                                                  22 at           



                                                  ,           



                                                  Until Thu           



                                                  22 at           



                                                  1445,           



                                                  Routine,           



                                                  Pain           



                                                  (scale           



                                                  7-10)<br>U           



                                                  se             



                                                  approved           



                                                  by             



                                                  (Faculty):           



                                                  ADC            



                                                  PROVIDER           

 

     Sliding      2022- No                       Subcutaneo           Uni

vers



     Scale                                us, Q3H,           ity of



     Insulin -      01:00: 18:35                          First dose           [facility]

[place]



     Lispro      00   :13                           (after           Medical



     (HumaLOG) +                                         last           Branch



     Fsbg                                         modificati           



     Testing                                         on) on 22 at           



                                                  2000,           



                                                  Until           



                                                  Discontinu           



                                                  ed,            



                                                  Routine           

 

     pantoprazol            Yes            40mg      40 mg,           Univ

ers



     e                                        Oral,           ity of



     (PROTONIX)      00:30:                               DAILY,           [place]



     EC tablet      00                                 First dose           Medi

sarah



     40 mg                                         on Wed           Branch



                                                  22 at           



                                                  1930,           



                                                  Until           



                                                  Discontinu           



                                                  ed,            



                                                  Routine           

 

     FENTanyl PF      2022- No             50ug      50 mcg,           Un

[facility]



     (SUBLIMAZE                                Intramuscu           it

y of



     (PF))      22:51: 01:01                          lar,           [place]



     injection      14   :59                           Q4HPRN,           Medical



     50 mcg                                         Starting           Branch



                                                  on 22 at           



                                                  1751,           



                                                  Until 22 at           



                                                  ,           



                                                  Routine,           



                                                  Pain           



                                                  (scale           



                                                  7-10)           

 

     Sliding      2022- No                       Subcutaneo           Uni

vers



     Scale                                us, Q3H,           ity of



     Insulin -      22:00: 00:29                          First dose           T

[facility]



     Lispro      00   :25                           on Wed           Medical



     (HumaLOG) +                                         22 at           [facility]



     Fsbg                                         1700,           



     Testing                                         Until           



                                                  Discontinu           



                                                  ed,            



                                                  Routine           

 

     acetaminoph            Yes            1000mg      1,000 mg,          

 Univers



     en                                       Oral, Q8H,           ity of



     (TYLENOL)      21:15:                               First dose           Te

[place]



     tablet      00                                 on Wed           Medical



     1,000 mg                                         22 at           [facility]

h



                                                  1615,           



                                                  Until           



                                                  Discontinu           



                                                  ed,            



                                                  Routine           

 

     FENTanyl PF      2022- No             50ug      50 mcg,           Un

[facility]



     (SUBLIMAZE                                Slow IV           ity o

f



     (PF))      21:07: 22:51                          Push,           [place]



     injection      07   :27                           Q4HPRN,           Medical



     50 mcg                                         Starting           Branch



                                                  on 22 at           



                                                  1607,           



                                                  Until 22 at           



                                                  1751,           



                                                  Routine,           



                                                  Pain           



                                                  (scale           



                                                  7-10)           

 

     insulin      2022- No             30U       30 Units,           Univ

ers



     glargine                                Subcutaneo           ity 

of



     (LANTUS      19:30: 18:47                          us, DAILY,           Eric

as



     U-100)      00   :44                           First dose           Medical



     injection                                         (after           Branch



     30 Units                                         last           



                                                  modificati           



                                                  on) on 22 at           



                                                  1430,           



                                                  Until           



                                                  Discontinu           



                                                  ed,            



                                                  Routine           

 

     glucagon            Yes            1mg       1 mg,           Univers



     (GLUCAGEN                                     Intramuscu           ity 

of



     DIAGNOSTIC      19:19:                               lar, PRN,           Te

[facility]



     KIT)      57                                 Starting           Medical



     injection 1                                         on Wed           Branch



     mg                                           22 at           



                                                  1419,           



                                                  Until           



                                                  Discontinu           



                                                  ed, ASAP,           



                                                  Blood           



                                                  Glucose           



                                                  &amp;lt;           



                                                  or = 70           



                                                  mg/dL and           



                                                  patient is           



                                                  unable to           



                                                  swallow or           



                                                  has mental           



                                                  changes.           

 

     dextrose 50            Yes            25mL      25 mL,           Univ

ers



     % in water                                     Slow IV           ity of



     (D50W)      19:19:                               Push, PRN,           [place]



     injection      57                                 Starting           Medica

l



     25 mL                                         on Wed           Branch



                                                  22 at           



                                                  1419,           



                                                  Until           



                                                  Discontinu           



                                                  ed, ASAP,           



                                                  Blood           



                                                  Glucose           



                                                  &amp;lt;           



                                                  or = 70           



                                                  mg/dL and           



                                                  patient is           



                                                  unable to           



                                                  swallow or           



                                                  has mental           



                                                  status           



                                                  changes.           

 

     FENTanyl PF      2022- No             25ug      25 mcg,           Un

[facility]



     (SUBLIMAZE                                Slow IV           ity o

f



     (PF))      18:57: 21:09                          Push,           [place]



     injection      03   :29                           Q4HPRN,           Medical



     25 mcg                                         Starting           Branch



                                                  on 22 at           



                                                  1357,           



                                                  Until 22 at           



                                                  1609,           



                                                  Routine,           



                                                  Pain           



                                                  (scale           



                                                  7-10)           

 

     D5W IV      2022- No             1000mL      at 500           Univer

s



     infusion                                mL/hr, IV           ity o

f



     1,000 mL      18:30: 20:00                          Infusion,           Eric

as



               00   :00                           ONCE, 1           Medical



                                                  dose, On           Branch



                                                  22 at           



                                                  1330,           



                                                  Routine           

 

     NaCl 0.9%      2022- No             1000mL      at 999           Uni

vers



     (NS) bolus                                mL/hr,           ity of



     infusion      18:15: 20:19                          1,000 mL,           Eric

as



     1,000 mL      00   :00                           IV             Medical



                                                  Infusion,           Branch



                                                  ONCE, 1           



                                                  dose, On           



                                                  22 at           



                                                  1315, STAT           

 

     potassium      2022- No             20meq      20 mEq, IV           

[facility]



     chloride 20                                Piggyback,           i

ty of



     mEq/100 mL      17:45: 21:20                          Q1H, 4           [place]

[place]



     (KCL) 20      00   :00                           doses,           Medical



     mEq/100 mL                                         First dose           Bra

[facility]



     RTU IVPB 20                                         on Wed           



     mEq                                          22 at           



                                                  1245, Last           



                                                  dose on           



                                                  22 at           



                                                  1500, 100           



                                                  mL             

 

     KCL       2022- No                       IV             Univers



     (POTASSIUM                                Infusion,           ity

 of



     CHLORIDE)      17:30: 00:27                          CONTINUOUS           T

[place]



     40 mEq in      00   :47                           , Starting           Medi

sarah



     NaCl 0.45%                                         on Wed           Branch



     (1/2NS) IV                                         22 at           



     Solution                                         1230,           



                                                  Until 22 at           



                                                  1927,           



                                                  1,000 mL,           



                                                  at 200           



                                                  mL/hr           

 

     KCL       -2022- No                       IV             Univers



     (POTASSIUM                                Infusion,           ity

 of



     CHLORIDE)      15:45: 17:15                          CONTINUOUS           T

[place]



     40 mEq in      00   :42                           , Starting           Medi

sarah



     NaCl 0.45%                                         on Wed           Branch



     (1/2NS) IV                                         22 at           



     Solution                                         1045,           



                                                  Until 22 at           



                                                  1215,           



                                                  1,000 mL,           



                                                  at 150           



                                                  mL/hr           

 

     ondansetron            Yes            4mg       4 mg, Slow           

Univers



     (ZOFRAN                                     IV Push,           ity of



     (PF))      15:38:                               Q6HPRN,           [place]



     injection 4      50                                 Nausea and           Me

dical



     mg                                           Vomiting           Branch



                                                  (N/V),           



                                                  Starting           



                                                  on 22 at           



                                                  1038<br>Do           



                                                  ses of           



                                                  ondansetro           



                                                  n 16 mg           



                                                  and above           



                                                  need to be           



                                                  administer           



                                                  ed via IV           



                                                  piggyback.           



                                                  For Dose           



                                                  >=24mg ECG           



                                                  monitoring           



                                                  is             



                                                  advisable.           



                                                  <br>           

 

     enoxaparin            Yes            40mg      40 mg,           Unive

rs



     (LOVENOX)                                     Subcutaneo           ity 

of



     injection      14:00:                               us, DAILY,           Te

[place]



     40 mg      00                                 First dose           Medical



                                                  on Wed           Branch



                                                  22 at           



                                                  0900,           



                                                  Until           



                                                  Discontinu           



                                                  ed,            



                                                  Routine           

 

     fluconazole      2022- No             200mg      at 100           [facility]

[facility]



     (DIFLUCAN)       07-28                          mL/hr, IV           ity

 of



     Piggyback      13:45: 00:25                          Piggyback,           [facility]

[place]



     200 mg      00   :36                           Q24H ABX,           Medical



                                                  First dose           [facility]



                                                  on 22 at           



                                                  0845,           



                                                  Until           



                                                  Discontinu           



                                                  ed,            



                                                  ASAP<br>Do           



                                                  Not            



                                                  Refrigerat           



                                                  e.<br>           

 

     NORepinephr      2022- No             .05ug/k      0.05-1.5         

  [facility]



     ine 4 mg in       0727                g/min      mcg/kg/min           

ity of



     0.9% NaCl      12:21: 22:51                          ?127 kg           [place]

s



     250 mL      22   :36                           (23.8125-7           Medical



     infusion                                         14.375           [facility]



     RTU                                          mL/hr,           



                                                  rounded to           



                                                  23..           



                                                  38 mL/hr),           



                                                  IV             



                                                  Infusion,           



                                                  TITRATE,           



                                                  MAP Goal >           



                                                  or = 65           



                                                  mmHg,           



                                                  Starting           



                                                  on 22 at           



                                                  0721<br>In           



                                                  itiate           



                                                  titration           



                                                  at 0.05           



                                                  mcg/kg/min           



                                                  .&nbsp;&nb           



                                                  sp;Increas           



                                                  e by 0.01           



                                                  mcg/kg/min           



                                                  every 30           



                                                  seconds to           



                                                  5 minutes           



                                                  as needed           



                                                  to reach           



                                                  and            



                                                  maintain           



                                                  goal blood           



                                                  pressure.&           



                                                  nbsp;&nbsp           



                                                  ;Maximum           



                                                  dose = 1.5           



                                                  mcg/kg/min           



                                                  .&nbsp;&nb           



                                                  sp;If goal           



                                                  not            



                                                  maintained           



                                                  at maximum           



                                                  allowed           



                                                  dose,           



                                                  contact           



                                                  prescriber           



                                                  .<br>           

 

     potassium      2022- No             10meq      10 mEq, IV           

[facility]



     chloride in                                Piggyback,           i

ty of



     water 10      12:00: 14:44                          Q1H, 3           [place]



     mEq/100 mL      00   :00                           doses,           Medical



     RTU 10 mEq                                         First dose           [facility]

nch



                                                  on 22 at           



                                                  0700, Last           



                                                  dose on           



                                                  22 at           



                                                  0900,           



                                                  Administer           



                                                  over 60           



                                                  Minutes,           



                                                  100 mL           

 

     aztreonam      2022- No             1000mg      1,000 mg,           

[facility]



     (AZACTAM)                                Slow IV           ity of



     injection      12:00: 12:41                          Push, Q8H           [facility]

[place]



     1,000 mg      00   :55                           ABX, First           Medic

al



                                                  dose on           [facility]



                                                  22 at           



                                                  0700,           



                                                  Until           



                                                  Discontinu           



                                                  ed<br>Reas           



                                                  on for           



                                                  Anti-Infec           



                                                  tive:           



                                                  Empiric           



                                                  Therapy           



                                                  for            



                                                  Suspected           



                                                  Infection<           



                                                  br>Empiric           



                                                  Therapy           



                                                  Site:           



                                                  Urine&lt;b           



                                                  r>Duration           



                                                  of             



                                                  therapy: 7           



                                                  days           

 

     NaCl 0.9%      2022- No             1000mL      at 999           Uni

vers



     (NS) IV                                mL/hr, IV           ity of



     infusion      11:45: 11:49                          Infusion,           [facility]

as



     1,000 mL      00   :30                           ONCE, 1           Medical



                                                  dose, On           [facility]



                                                  22 at           



                                                  0645,           



                                                  Routine           

 

     NaCl 0.45%      2022- No             1000mL      at 250           Un

[place]



     (1/2NS) IV                                mL/hr,           ity of



     infusion      11:30: 14:41                          1,000 mL,           Eric

as



     1,000 mL      00   :42                           IV             Medical



                                                  Infusion,           [facility]



                                                  CONTINUOUS           



                                                  , Starting           



                                                  on 22 at           



                                                  0630,           



                                                  Until 22 at           



                                                  0941, ASAP           

 

     insulin      2022- No             0U/kg/h      0-0.3           Unive

rs



     regular in                                Units/kg/h           it

y of



     0.9 % NaCl      11:19: 19:18                          r ?127 kg           T

[facility]



     (MYXREDLIN)      08   :18                           (0-38.1           Medic

al



     100                                          mL/hr), IV           [facility]



     unit/100 mL                                         Infusion,           



     (1 unit/mL)                                         TITRATE,           



     RTU IV                                         Parameters           



     infusion                                         in Admin.           



                                                  Instr.,           



                                                  Follow DKA           



                                                  Insulin           



                                                  Rate           



                                                  Adjustment           



                                                  Protocol,           



                                                  Starting           



                                                  on 22 at           



                                                  0619<br>-           



                                                  Follow           



                                                  'DKA           



                                                  Insulin           



                                                  Rate           



                                                  Adjustment           



                                                  Protocol'           



                                                  &nbsp;-           



                                                  Start           



                                                  insulin           



                                                  infusion           



                                                  at 0.1           



                                                  units/kg           



                                                  /hr. When           



                                                  adjusting           



                                                  rate, do           



                                                  not            



                                                  increase           



                                                  rate above           



                                                  0.3            



                                                  units/kg/h           



                                                  our.&nbsp;           



                                                  - Hold           



                                                  insulin           



                                                  and [facility]O           



                                                  STAT if           



                                                  potassium           



                                                  is less           



                                                  than 3.3           



                                                  mEq/L.&nbs           



                                                  p;- [facility]O           



                                                  STAT if           



                                                  blood           



                                                  glucose           



                                                  less than           



                                                  100 mg/dL           



                                                  or greater           



                                                  than 600           



                                                  mg/dL or           



                                                  if blood           



                                                  glucose           



                                                  not            



                                                  decreased           



                                                  by 50           



                                                  mg/dL in           



                                                  the first           



                                                  hour of           



                                                  insulin           



                                                  infusion.&           



                                                  nbsp;-           



                                                  Once blood           



                                                  glucose is           



                                                  less than           



                                                  or equal           



                                                  to 200           



                                                  mg/dL in           



                                                  patient           



                                                  with DKA           



                                                  or blood           



                                                  glucose is           



                                                  less than           



                                                  or equal           



                                                  to 300           



                                                  mg/dL in           



                                                  patient           



                                                  with HHS,           



                                                  change to           



                                                  fluids           



                                                  with           



                                                  dextrose.&           



                                                  nbsp;&amp;           



                                                  nbsp;&nbsp           



                                                  ;- If           



                                                  during           



                                                  insulin           



                                                  infusion           



                                                  and on           



                                                  dextrose           



                                                  fluids           



                                                  blood           



                                                  glucose is           



                                                  less than           



                                                  150 mg/dL           



                                                  in DKA or           



                                                  less than           



                                                  200 mg/dL           



                                                  in HHS,           



                                                  [facility]O for           



                                                  increase           



                                                  in             



                                                  dextrose           



                                                  provided           



                                                  in             



                                                  continuous           



                                                  fluids.&nb           



                                                  sp;- Once           



                                                  AGAP less           



                                                  than 12,           



                                                  blood           



                                                  glucose           



                                                  less than           



                                                  200 mg/dL,           



                                                  TCO2           



                                                  greater           



                                                  than 15 x           



                                                  2 and           



                                                  patient           



                                                  ready to           



                                                  eat, [facility]O           



                                                  for SubQ           



                                                  glargine           



                                                  order two           



                                                  hours           



                                                  before           



                                                  stopping           



                                                  insulin           



                                                  infusion.<           



                                                  br>            

 

     NaCl 0.9%      2022- No             1000mL      at 999           Uni

vers



     (NS) IV                                mL/hr, IV           ity of



     infusion      08:30: 11:00                          Infusion,           [facility]

as



     1,000 mL      00   :00                           ONCE, 1           Medical



                                                  dose, On           [facility]



                                                  22 at           



                                                  0330,           



                                                  Routine           

 

     NaCl 0.45%      2022- No             1000mL      at 250           Un

[facility]



     (1/2NS) IV                                mL/hr,           ity of



     infusion      06:00: 11:20                          1,000 mL,           [facility]

as



     1,000 mL      00   :23                           IV             Medical



                                                  Infusion,           [facility]



                                                  CONTINUOUS           



                                                  , Starting           



                                                  on 22 at           



                                                  0100,           



                                                  Until 22 at           



                                                  0620, ASAP           

 

     FENTanyl PF      2022- No             50ug      50 mcg,           Un

[facility]



     (SUBLIMAZE                                Slow IV           ity o

f



     (PF))      04:15: 03:07                          Push,           [place]



     injection      00   :00                           ONCE, 1           Medical



     50 mcg                                         dose, On           [facility]



                                                  Tue            



                                                  22 at           



                                                  2315,           



                                                  Routine           

 

     cefTRIAXone      2022- No             1000mg      1,000 mg,         

  [facility]



     (ROCEPHIN)                                Intravenou           it

y of



     1,000 mg in      04:00: 06:06                          s, ONCE, 1          

 [place]



     NaCl 0.9%      00   :00                           dose, On           Medica

l



     (NS) 50 mL                                         Tue            [facility]



     MINI-BAG                                         22 at           



                                                  2300,           



                                                  Administer           



                                                  over 30           



                                                  Minutes,           



                                                  50             



                                                  mL<br&gt;R           



                                                  [place]ira for           



                                                  Anti-Infec           



                                                  tive:           



                                                  Documented           



                                                  Infection<           



                                                  br>Documen           



                                                  huma            



                                                  Infection           



                                                  Site:           



                                                  Urine<br&g           



                                                  t;Duration           



                                                  of             



                                                  Therapy:           



                                                  Other (see           



                                                  Comments)           

 

     NaCl 0.9%      2022- No             1000mL      at 999           Uni

vers



     (NS) bolus                                mL/hr,           ity of



     infusion      03:00: 04:14                          1,000 mL,           [facility]

as



     1,000 mL      00   :00                           IV             Medical



                                                  Infusion,           [facility]



                                                  ONCE, 1           



                                                  dose, On           



                                                  22 at           



                                                  2200, STAT           

 

     insulin      2022- No             10U       10 Units,           Univ

ers



     regular                                Subcutaneo           ity o

f



     human      01:30: 00:37                          , ONCE,           [place]



     (HUMULIN R)      00   :00                           1 dose, On           Me

dical



     injection                                         Tu            [facility]



     10 Units                                         22 at           



                                                  2030,           



                                                  Routine           

 

     FENTanyl PF      2022- No             50ug      50 mcg,           Un

[facility]



     (SUBLIMAZE                                Slow IV           ity o

f



     (PF))      01:30: 00:28                          Push,           [place]



     injection      00   :00                           ONCE, 1           Medical



     50 mcg                                         dose, On           [facility]



                                                  e            



                                                  22 at           



                                                  2030,           



                                                  Routine           

 

     NaCl 0.9%      2022- No             1000mL      at 999           Uni

vers



     (NS) bolus                                mL/hr,           ity of



     infusion      00:30: 02:09                          1,000 mL,           Eric

as



     1,000 mL      00   :00                           IV             Medical



                                                  Infusion,           [facility]



                                                  ONCE, 1           



                                                  dose, On           



                                                  Tue            



                                                  22 at           



                                                  1930, STAT           

 

     iopamidol      2022- No        [id number] 65mL      65 mL,         

  [facility]



     (ISOVUE                 [id number]           Intravenou           ity

 of



     370-500 mL)      02:29: 02:45                          s, ONCE, 1          

 [place]



     injection      00   :00                           dose, On           Medica

l



     65 mL                                         Fri            [facility]



                                                  7/15/22 at           



                                                  2145,           



                                                  Routine           

 

     FENTanyl PF      2022- No             150ug      150 mcg,           

[facility]



     (SUBLIMAZE                                Slow IV           ity o

f



     (PF))      01:32: 01:44                          Push,           [place]



     injection      00   :00                           ONCE, 1           Medical



     150 mcg                                         dose, On           [facility]



                                                  Fri            



                                                  7/15/22 at           



                                                  2045,           



                                                  Routine           

 

     FENTanyl PF      2022- No             50ug      50 mcg,           Un

[facility]



     (SUBLIMAZE      7-16 07-15                          Slow IV           ity o

f



     (PF))      00:30: 23:32                          Push,           [place]



     injection      00   :00                           ONCE, 1           Medical



     50 mcg                                         dose, On           [facility]



                                                  Fri            



                                                  7/15/22 at           



                                                  1930,           



                                                  Routine           

 

     insulin      2022- No             10U       10 Units,           Univ

ers



     regular      7-16 07-15                          Subcutaneo           ity o

f



     human      00:30: 23:28                          us, ONCE,           [place]



     (HUMULIN R)      00   :00                           1 dose, On           Me

dical



     injection                                         Fri            [facility]



     10 Units                                         7/15/22 at           



                                                  1930,           



                                                  Routine           

 

     clindamycin      2022- No        [id number] 600mg      Take 2      

     [facility]



     300 mg      7-15 07-23           [id number]           capsules           ity of



     capsule      00:00: 04:59                          by mouth 4           Eric

as



               00   :00                           (four)           Medical



                                                  times           [facility]



                                                  daily for           



                                                  7 days.           

 

     clindamycin      2022- No        [id number] 600mg      Take 2      

     [facility]



     300 mg      7-15 07-23           [id number]           capsules           ity of



     capsule      00:00: 04:59                          by mouth 4           [facility]

as



               00   :00                           (four)           Medical



                                                  times           [facility]



                                                  daily for           



                                                  7 days.           

 

     insulin NPH      2022- No             8U        8 Units,           U

nivers



     and regular      06                          Subcutaneo           i

ty of



     human 70-30      12:30: 11:30                          us, ONCE,           

[place]



     (70-30      00   :00                           1 dose, On           Medical



     U-100                                         Wed 22           [facility]



     INSULIN)                                         at 0730,           



     100 unit/mL                                         ASAP           



     (70-30)                                                        



     injection 8                                                        



     Units                                                        

 

     insulin            Yes       [id number] 20U       inject 20           U

nivers



     glargine      7-02                               Units           ity of



     100 unit/mL      00:00:                               under the           [facility]

[facility]



     injection      00                                 skin           Medical



                                                  daily.           [facility]

 

     insulin            Yes       [id number] 20U       inject 20           U

nivers



     glargine      7-02                               Units           ity of



     100 unit/mL      00:00:                               under the           [facility]

[facility]



     injection      00                                 skin           Medical



                                                  daily.           [facility]

 

     insulin            Yes       [id number] 20U       inject 20           U

nivers



     glargine      7-02                               Units           ity of



     100 unit/mL      00:00:                               under the           T

[facility]



     injection      00                                 skin           Medical



                                                  daily.           [facility]

 

     insulin            Yes       [id number] 20U       inject 20           U

nivers



     glargine      7-02                               Units           ity of



     100 unit/mL      00:00:                               under the           T

[facility]



     injection      00                                 skin           Medical



                                                  daily.           [facility]

 

     insulin      2022- No        [id number] 20U       inject 20           

[facility]



     glargine      702 07-30                          Units           ity of



     100 unit/mL      00:00: 00:00                          under the           

[place]



     injection      00   :00                           skin           Medical



                                                  daily.           [facility]

 

     insulin      2022- No        [id number] 20U       inject 20           

[facility]



     glargine      7-02 07-30                          Units           ity of



     100 unit/mL      00:00: 00:00                          under the           

[place]



     injection      00   :00                           skin           Medical



                                                  daily.           [facility]

 

     insulin            Yes            20U       20 Units,           Unive

rs



     glargine                                     Subcutaneo           ity o

f



     (LANTUS      14:00:                               [place], DAILY,           [place]

[place]



     U-100)      00                                 First dose           Medical



     injection                                         (after           [facility]



     20 Units                                         last           



                                                  modificati           



                                                  on) on 22 at           



                                                  0900,           



                                                  Until           



                                                  Discontinu           



                                                  ed,            



                                                  Routine           

 

     insulin      2022- No        [id number] 26U       inject 26           

[facility]



     lispro,       08-                          Units           ity of



     human, 100      00:00: 04:59                          under the           [facility]

[facility]



     unit/mL      00   :00                           skin 3           Medical



     injection                                         (three)           [facility]



                                                  times           



                                                  daily           



                                                  before           



                                                  meals for           



                                                  30 days.           

 

     insulin      2022- No        [id number] 26U       inject 26           

[facility]



     lispro,       08-                          Units           ity of



     human, 100      00:00: 04:59                          under the           T

[facility]



     unit/mL      00   :00                           skin 3           Medical



     injection                                         (three)           [facility]



                                                  times           



                                                  daily           



                                                  before           



                                                  meals for           



                                                  30 days.           

 

     insulin      2022- No        [id number] 26U       inject 26           

[facility]



     lispro,       08-                          Units           ity of



     human, 100      00:00: 04:59                          under the           T

[facility]



     unit/mL      00   :00                           skin 3           Medical



     injection                                         (three)           [facility]



                                                  times           



                                                  daily           



                                                  before           



                                                  meals for           



                                                  30 days.           

 

     insulin      2022- No        [id number] 26U       inject 26           

[facility]



     lispro,      -                          Units           ity of



     human, 100      00:00: 04:59                          under the           T

[facility]



     unit/mL      00   :00                           skin 3           Medical



     injection                                         (three)           [facility]



                                                  times           



                                                  daily           



                                                  before           



                                                  meals for           



                                                  30 days.           

 

     insulin      2022- No        [id number] 26U       inject 26           

[facility]



     lispro,       07-30                          Units           ity of



     human, 100      00:00: 00:00                          under the           T

[facility]



     unit/mL      00   :00                           skin 3           Medical



     injection                                         (three)           [facility]



                                                  times           



                                                  daily           



                                                  before           



                                                  meals for           



                                                  30 days.           

 

     insulin      2022- No        [id number] 26U       inject 26           

[facility]



     lispro,       07-30                          Units           ity of



     human, 100      00:00: 00:00                          under the           T

[facility]



     unit/mL      00   :00                           skin 3           Medical



     injection                                         (three)           [facility]



                                                  times           



                                                  daily           



                                                  before           



                                                  meals for           



                                                  30 days.           

 

     insulin            Yes            26U       26 Units,           Unive

rs



     lispro      30                               Subcutaneo           ity of



     (human)      22:15:                               [place] [facility]

[facility]



     (HumaLOG      00                                 First dose           Medic

al



     U-100)                                         (after           [facility]



     injection                                         last           



     26 Units                                         modificati           



                                                  on) on u           



                                                  22 at           



                                                  1715,           



                                                  Until           



                                                  Discontinu           



                                                  ed,            



                                                  Routine           

 

     insulin      2022- No             22U       22 Units,           Univ

ers



     lispro       06-30                          Subcutaneo           ity of



     (human)      16:30: 21:36                          ANGEL [facility] [place]

as



     (HumaLOG      00   :36                           First dose           Medic

al



     U-100)                                         (after           [facility]



     injection                                         last           



     22 Units                                         modificati           



                                                  on) on u           



                                                  22 at           



                                                  1130,           



                                                  Until           



                                                  Discontinu           



                                                  ed,            



                                                  Routine           

 

     insulin      2022- No             10U       10 Units,           Univ

ers



     glargine                                Subcutaneo           ity 

of



     (LANTUS      14:00: 17:40                          us, DAILY,           [facility]

as



     U-100)      00   :01                           First dose           Medical



     injection                                         on u           [facility]



     10 Units                                         22 at           



                                                  0900,           



                                                  Until           



                                                  Discontinu           



                                                  ed,            



                                                  Routine           

 

     insulin            Yes            52U       52 Units,           Unive

rs



     glargine                                     Subcutaneo           ity o

f



     (LANTUS      02:00:                               us, QHS,           [place]



     U-100)      00                                 First dose           Medical



     injection                                         on Wed           [facility]



     52 Units                                         22 at           



                                                  2100,           



                                                  Until           



                                                  Discontinu           



                                                  ed,            



                                                  Routine           

 

     enoxaparin            Yes            40mg      40 mg,           Unive

rs



     (LOVENOX)                                     Subcutaneo           ity 

of



     injection      22:00:                               us, DAILY,           [place]

[place]



     40 mg      00                                 First dose           Medical



                                                  on Wed           [facility]



                                                  22 at           



                                                  1700,           



                                                  Until           



                                                  Discontinu           



                                                  ed,            



                                                  Routine           

 

     Sliding            Yes                      Subcutaneo           Univ

ers



     Scale                                     us, TID           ity of



     Insulin -      17:00:                               MEALS+HS,           [facility]

as



     Lispro      00                                 First dose           Medical



     (HumaLOG) +                                         (after           [facility]



     Fsbg                                         last           



     Testing                                         modificati           



                                                  on) on 22 at           



                                                  1200,           



                                                  Until           



                                                  Discontinu           



                                                  ed,            



                                                  Routine           

 

     insulin      2022- No             18U       18 Units,           Univ

ers



     lispro                                Subcutaneo           ity of



     (human)      16:30: 14:46                          us, TIDAC,           [facility]

as



     (HumaLOG      00   :32                           First dose           Medic

al



     U-100)                                         (after           [facility]



     injection                                         last           



     18 Units                                         modificati           



                                                  on) on 22 at           



                                                  1130,           



                                                  Until           



                                                  Discontinu           



                                                  ed,            



                                                  Routine           

 

     amLODIPine            Yes            10mg      10 mg,           Unive

rs



     (NORVASC)                                     Oral,           ity of



     tablet 10      14:00:                               DAILY,           [place]



     mg        00                                 First dose           Medical



                                                  on Wed           [facility]



                                                  22 at           



                                                  0900,           



                                                  Until           



                                                  Discontinu           



                                                  ed,            



                                                  Routine           

 

     cefTRIAXone            Yes            1000mg      1,000 mg,          

 [facility]



     (ROCEPHIN)                                     IV             ity of



     1,000 mg in      13:00:                               Piggyback,           

[place]



     NaCl 0.9%      00                                 Q24H ABX,           Medic

al



     (NS) 50 mL                                         First dose           Bra

nch



     MINI-BAG                                         on 22 at           



                                                  0800,           



                                                  Until           



                                                  Discontinu           



                                                  ed,            



                                                  Administer           



                                                  over 30           



                                                  Minutes,           



                                                  50             



                                                  mL<br>Reas           



                                                  on for           



                                                  Anti-Infec           



                                                  tive:           



                                                  Documented           



                                                  Infection<           



                                                  br>Documen           



                                                  huma            



                                                  Infection           



                                                  Site:           



                                                  Urine<br>D           



                                                  uration of           



                                                  Therapy: 7           



                                                  days           

 

     Sliding       No                       Subcutaneo           Uni

vers



     Scale                                us, TID           ity of



     Insulin -      13:00: 15:19                          MEALS+HS,           [facility]

[place]



     Lispro      00   :51                           First dose           Medical



     (HumaLOG) +                                         on Wed           [facility]



     Fsbg                                         22 at           



     Testing                                         0800,           



                                                  Until           



                                                  Discontinu           



                                                  ed,            



                                                  Routine           

 

     insulin       No             15U       15 Units,           Univ

ers



     lispro                                Subcutaneo           ity of



     (human)      12:30: 15:19                          us, TIDAC,           Eric

as



     (HumaLOG      00   :51                           First dose           Medic

al



     U-100)                                         on Wed           Branch



     injection                                         22 at           



     15 Units                                         0730,           



                                                  Until           



                                                  Discontinu           



                                                  ed,            



                                                  Routine           

 

     HYDROmorpho       No             1mg       1 mg, Slow          

 [facility]



     ne         0701                          IV Push,           ity of



     (DILAUDID)      11:32: 11:31                          Q6HPRN,           Eric

as



     injection 1      00   :00                           Starting           Medi

sar[place]



     mg                                           on Wed           Branch



                                                  22 at           



                                                  0632,           



                                                  Until 22 at           



                                                  0631,           



                                                  Routine,           



                                                  Pain           



                                                  (scale           



                                                  7-10)<br>U           



                                                  se             



                                                  approved           



                                                  by             



                                                  (Faculty):           



                                                  [facility]            



                                                  PROVIDER           

 

     NaCl 0.9%       No             1000mL      at 999           Uni

vers



     (NS) bolus                                mL/hr,           ity of



     infusion      11:00: 11:04                          1,000 mL,           Eric

as



     1,000 mL      00   :00                           IV             Medical



                                                  Infusion,           Branch



                                                  ONCE, 1           



                                                  dose, On           



                                                  22 at           



                                                  0600, STAT           

 

     HYDROcodone            Yes            1{tbl}      1 tablet,          

 [facility]



     -acetaminop                                     Oral,           ity of



     hen (NORCO      10:54:                               Q6HPRN,           [place]

s



     5) 5-325 mg      27                                 Starting           Medi

sarah



     tablet 1                                         on Wed           Branch



     tablet                                         22 at           



                                                  0554,           



                                                  Until           



                                                  Discontinu           



                                                  ed,            



                                                  Routine,           



                                                  Pain           



                                                  (scale           



                                                  4-6)           

 

     dextrose            Yes            250mL      250 mL, IV           Un

[place]



     10% (D10W)                                     Infusion,           ity 

of



     bolus      10:53:                               PRN - SEE           [place]



     infusion      43                                 INSTRUCTIO           Medic

al



     250 mL                                         NS,            Branch



                                                  Administer           



                                                  over 60           



                                                  Minutes,           



                                                  hypoglycem           



                                                  ia,            



                                                  Starting           



                                                  on 22 at           



                                                  0553<br>De           



                                                  xtrose 10%           



                                                  250 mL bag           



                                                  contains:&           



                                                  nbsp;10 gm           



                                                  = 100           



                                                  mL&nbsp;20           



                                                  gm = 200           



                                                  mL&nbsp;25           



                                                  gm = 250           



                                                  mL (whole           



                                                  bag)&nbsp;           



                                                  &nbsp;The           



                                                  maximum           



                                                  rate at           



                                                  which           



                                                  dextrose           



                                                  can be           



                                                  infused           



                                                  without           



                                                  producing           



                                                  glycosuria           



                                                  is 0.5           



                                                  g/kg/hour.           



                                                  &nbsp;&nbs           



                                                  p;BUD: If           



                                                  wrapper is           



                                                  open bag           



                                                  is good           



                                                  for 30           



                                                  days at           



                                                  room           



                                                  temperatur           



                                                  e.&nbsp;<b           



                                                  r>             

 

     glucagon            Yes            1mg       1 mg,           [facility]



     (GLUCAGEN                                     Intramuscu           ity 

of



     DIAGNOSTIC      10:53:                               lar, PRN,           [place]

[place]



     KIT)      40                                 Starting           Medical



     injection 1                                         on Wed           Branch



     mg                                           22 at           



                                                  0553,           



                                                  Until           



                                                  Discontinu           



                                                  ed, ASAP,           



                                                  Blood           



                                                  Glucose           



                                                  &amp;lt;           



                                                  or = 70           



                                                  mg/dL and           



                                                  patient is           



                                                  unable to           



                                                  swallow or           



                                                  has mental           



                                                  changes.           

 

     acetaminoph            Yes            650mg      650 mg,           Un

[place]



     en                                       Oral,           ity of



     (TYLENOL)      10:52:                               Q6HPRN,           [place]



     tablet 650      33                                 Starting           Medic

al



     mg                                           on Wed           Branch



                                                  22 at           



                                                  0552,           



                                                  Until           



                                                  Discontinu           



                                                  ed,            



                                                  Routine,           



                                                  Pain           



                                                  (scale           



                                                  1-3)           

 

     NaCl 0.9%      2022- No             1000mL      at 999           Uni

vers



     (NS) bolus                                mL/hr,           ity of



     infusion      09:00: 08:10                          1,000 mL,           Eric

as



     1,000 mL      00   :00                           IV             Medical



                                                  Infusion,           Branch



                                                  ONCE, 1           



                                                  dose, On           



                                                  22 at           



                                                  0400, STAT           

 

     FENTanyl PF      2022- No             50ug      50 mcg,           Un

[place]



     (SUBLIMAZE                                Slow IV           ity o

f



     (PF))      08:22: 08:27                          Push,           [place]



     injection      00   :00                           ONCE, 1           Medical



     50 mcg                                         dose, On           Branch



                                                  22 at           



                                                  0330,           



                                                  Routine           

 

     insulin      2022- No             10U       10 Units,           Univ

ers



     regular                                Slow IV           ity of



     human      07:00: 05:53                          Push,           [place]



     (HUMULIN R)      00   :00                           ONCE, 1           Medic

al



     injection                                         dose, On           Branch



     10 Units                                         22 at           



                                                  0200, STAT           

 

     FENTanyl PF      2022- No             50ug      50 mcg,           Un

[place]



     (SUBLIMAZE                                Slow IV           ity o

f



     (PF))      04:15: 04:04                          Push,           [place]



     injection      00   :00                           ONCE, 1           Medical



     50 mcg                                         dose, On           Branch



                                                  22 at           



                                                  2315, STAT           

 

     NaCl 0.9%       No             1000mL      at 999           Uni

vers



     (NS) bolus                                mL/hr,           ity of



     infusion      04:00: 05:53                          1,000 mL,           Eric

as



     1,000 mL      00   :00                           IV             Medical



                                                  Infusion,           Branch



                                                  ONCE, 1           



                                                  dose, On           



                                                  22 at           



                                                  2300, STAT           

 

     insulin      2022- No             10U       10 Units,           Univ

ers



     regular                                Slow IV           ity of



     human      04:00: 03:48                          Push,           [place]



     (HUMULIN R)      00   :00                           ONCE, 1           Medic

al



     injection                                         dose, On           Branch



     10 Units                                         22 at           



                                                  2300, STAT           

 

     amLODIPine      -2022- No        [id number] 10mg      Take 1           

[facility]



     10 mg      6-26 07-30                          tablet by           ity of



     tablet      00:00: 00:00                          mouth           [place]



               00   :00                           daily for           Medical



                                                  30 days.           Branch

 

     amLODIPine      2022- No        [id number] 10mg      Take 1           

[facility]



     10 mg      6-26 07-30                          tablet by           ity of



     tablet      00:00: 00:00                          mouth           [place]



               00   :00                           daily for           Medical



                                                  30 days.           Branch

 

     amLODIPine      2022- No        [id number] 10mg      Take 1           

[facility]



     10 mg      6-26 07-27                          tablet by           ity of



     tablet      00:00: 04:59                          mouth           [place]



               00   :00                           daily for           Medical



                                                  30 days.           Branch

 

     amLODIPine      2022- No        [id number] 10mg      Take 1           

[facility]



     10 mg      6-26 07-27                          tablet by           ity of



     tablet      00:00: 04:59                          mouth           [place]



               00   :00                           daily for           Medical



                                                  30 days.           [place]

 

     amLODIPine      2022- No        [id number] 10mg      Take 1           

[facility]



     10 mg      6-26 07-27                          tablet by           ity of



     tablet      00:00: 04:59                          mouth           [place]



               00   :00                           daily for           Medical



                                                  30 days.           [place]

 

     amLODIPine      2022- No        [id number] 10mg      Take 1           

[facility]



     10 mg      6-26 07-27                          tablet by           ity of



     tablet      00:00: 04:59                          mouth           [place]



               00   :00                           daily for           Medical



                                                  30 days.           [place]

 

     amLODIPine      2022- No        [id number] 10mg      Take 1           

[facility]



     10 mg      6-26 07-27                          tablet by           ity of



     tablet      00:00: 04:59                          mouth           [place]



               00   :00                           daily for           Medical



                                                  30 days.           [place]

 

     amLODIPine      2022- No        [id number] 10mg      Take 1           

[facility]



     10 mg      6-26 07-27                          tablet by           ity of



     tablet      00:00: 04:59                          mouth           [place]



               00   :00                           daily for           Medical



                                                  30 days.           [place]

 

     amLODIPine      2022- No        [id number] 10mg      Take 1           

[facility]



     10 mg      6--27                          tablet by           ity of



     tablet      00:00: 04:59                          mouth           [place]



               00   :00                           daily for           Medical



                                                  30 days.           [place]

 

     HYDROmorpho      2022- No             .5mg      0.5 mg,           Un

[place]



     ne        -                          Slow IV           ity of



     (DILAUDID)      18:00: 17:11                          Push,           [place]



     injection      00   :00                           ONCE, 1           Medical



     0.5 mg                                         dose, On           Branch



                                                  Sat            



                                                  22 at           



                                                  1300,           



                                                  Routine<br           



                                                  >Use           



                                                  approved           



                                                  by             



                                                  (Faculty):           



                                                  [facility]            



                                                  PROVIDER           

 

     cefTRIAXone            Yes            1000mg      1,000 mg,          

 [facility]



     (ROCEPHIN)                                     IV             ity of



     1,000 mg in      15:00:                               Piggyback,           

[place]



     NaCl 0.9%      00                                 Q24H ABX,           Medic

al



     (NS) 50 mL                                         First dose           [facility]

[facility]



     MINI-BAG                                         on Sat           



                                                  22 at           



                                                  1000,           



                                                  Until           



                                                  Discontinu           



                                                  ed,            



                                                  Administer           



                                                  over 30           



                                                  Minutes,           



                                                  50             



                                                  mL<br>Reas           



                                                  on for           



                                                  Anti-Infec           



                                                  tive:           



                                                  Documented           



                                                  Infection<           



                                                  br>Documen           



                                                  huma            



                                                  Infection           



                                                  Site:           



                                                  Urine<br>D           



                                                  uration of           



                                                  Therapy: 7           



                                                  days           

 

     fluconazole            Yes            200mg      200 mg,           Un

[place]



     (DIFLUCAN)      6-25                               Oral,           ity of



     tablet 200      14:00:                               DAILY,           [place]



     mg        00                                 First dose           Medical



                                                  on Sat           Branch



                                                  22 at           



                                                  0900,           



                                                  Until           



                                                  Discontinu           



                                                  ed,            



                                                  ASAP<br>Re           



                                                  ason for           



                                                  Anti-Infec           



                                                  tive:           



                                                  Documented           



                                                  Infection<           



                                                  br>Documen           



                                                  huma            



                                                  Infection           



                                                  Site:           



                                                  Urine<br>D           



                                                  uration of           



                                                  Therapy: 7           



                                                  days           

 

     Sliding            Yes                      Subcutaneo           Univ

ers



     Scale      -                               us, TID           ity of



     Insulin -      13:00:                               MEALS+HS,           Eric

as



     Lispro      00                                 First dose           Medical



     (HumaLOG) +                                         on Sat           Branch



     Fsbg                                         22 at           



     Testing                                         0800,           



                                                  Until           



                                                  Discontinu           



                                                  ed,            



                                                  Routine           

 

     hydromorpho       No             4ug       Take 4 mcg          

 [facility]



     ne HCl                                by mouth           ity of



     (HYDROMORPH      11:55: 00:00                          every 12           [place]

[place]



     ONE ORAL)      19   :00                           (twelve)           Medica

l



                                                  hours.           [place]

 

     insulin       No             10U       10 Units,           Univ

ers



     lispro                                Subcutaneo           ity of



     (human)      07:00: 06:53                          us, ONCE,           [place]

[place]



     (HumaLOG      00   :00                           1 dose, On           Medic

al



     U-100)                                         Sat            Branch



     injection                                         22 at           



     10 Units                                         0200,           



                                                  Routine           

 

     glucagon            Yes            1mg       1 mg,           [facility]



     (GLUCAGEN                                     Intramuscu           ity 

of



     DIAGNOSTIC      05:50:                               lar, PRN,           [facility]

[place]



     KIT)      51                                 Starting           Medical



     injection 1                                         on Sat           [facility]



     mg                                           22 at           



                                                  0050,           



                                                  Until           



                                                  Discontinu           



                                                  ed, ASAP,           



                                                  Blood           



                                                  Glucose           



                                                  &amp;lt;           



                                                  or = 70           



                                                  mg/dL and           



                                                  patient is           



                                                  unable to           



                                                  swallow or           



                                                  has mental           



                                                  changes.           

 

     dextrose            Yes            250mL      250 mL, IV           Un

[place]



     10% (D10W)                                     Infusion,           ity 

of



     bolus      05:50:                               PRN - SEE           [place]



     infusion      51                                 INSTRUCTIO           Medic

al



     250 mL                                         NS,            Branch



                                                  Administer           



                                                  over 60           



                                                  Minutes,           



                                                  BS < 70,           



                                                  Starting           



                                                  on Sat           



                                                  22 at           



                                                  0050<br>De           



                                                  xtrose 10%           



                                                  250 mL bag           



                                                  contains:&           



                                                  nbsp;10 gm           



                                                  = 100           



                                                  mL&nbsp;20           



                                                  gm = 200           



                                                  mL&nbsp;25           



                                                  gm = 250           



                                                  mL (whole           



                                                  bag)&nbsp;           



                                                  &nbsp;The           



                                                  maximum           



                                                  rate at           



                                                  which           



                                                  dextrose           



                                                  can be           



                                                  infused           



                                                  without           



                                                  producing           



                                                  glycosuria           



                                                  is 0.5           



                                                  g/kg/hour.           



                                                  &nbsp;&nbs           



                                                  p;BUD: If           



                                                  wrapper is           



                                                  open bag           



                                                  is good           



                                                  for 30           



                                                  days at           



                                                  room           



                                                  temperatur           



                                                  e.&nbsp;<b           



                                                  r>             

 

     insulin            Yes            52U       52 Units,           Unive

rs



     glargine                                     Subcutaneo           ity o

f



     (LANTUS      02:00:                               us, [facility],           [place]



     U-100)      00                                 First dose           Medical



     injection                                         on Fri           Branch



     52 Units                                         22 at           



                                                  2100,           



                                                  Until           



                                                  Discontinu           



                                                  ed,            



                                                  Routine           

 

     Sliding      2022- No                       Subcutaneo           Uni

vers



     Scale       06-25                          us, TID           ity of



     Insulin -      02:00: 05:50                          MEALS+HS,           [facility]

[place]



     Lispro      00   :40                           First dose           Medical



     (HumaLOG) +                                         on Fri           Branch



     Fsbg                                         22 at           



     Testing                                         2100,           



                                                  Until           



                                                  Discontinu           



                                                  ed,            



                                                  Routine           

 

     ciprofloxac      2022- No        [id number] 500mg      Take 1         

  [facility]



     in HCl 500      -                          tablet by           ity

 of



     mg tablet      00:00: 04:59                          mouth           [place]



               00   :00                           every 12           Medical



                                                  (twelve)           Branch



                                                  hours for           



                                                  10 days.           

 

     ciprofloxac      2022- No        [id number] 500mg      Take 1         

  [facility]



     in HCl 500      -                          tablet by           ity

 of



     mg tablet      00:00: 04:59                          mouth           [place]



               00   :00                           every 12           Medical



                                                  (twelve)           Branch



                                                  hours for           



                                                  10 days.           

 

     ciprofloxac      2022- No        [id number] 500mg      Take 1         

  [facility]



     in HCl 500      -                          tablet by           ity

 of



     mg tablet      00:00: 04:59                          mouth           [place]



               00   :00                           every 12           Medical



                                                  (twelve)           Branch



                                                  hours for           



                                                  10 days.           

 

     HYDROmorpho      2022- No        4647 4mg       Take 1           Uni

vers



     ne 4 mg                                tablet by           ity of



     tablet      00:00: 04:59                          mouth           [place]



               00   :00                           every 12           Medical



                                                  (twelve)           Branch



                                                  hours for           



                                                  7 days.           



                                                  Indication           



                                                  s: acute           



                                                  pain           

 

     HYDROmorpho      2022- No        4647 4mg       Take 1           Uni

vers



     ne 4 mg                                tablet by           ity of



     tablet      00:00: 04:59                          mouth           [place]



               00   :00                           every 12           Medical



                                                  (twelve)           Branch



                                                  hours for           



                                                  7 days.           



                                                  Indication           



                                                  s: acute           



                                                  pain           

 

     HYDROmorpho      -2022- No        4647 4mg       Take 1           Uni

vers



     ne 4 mg                                tablet by           ity of



     tablet      00:00: 04:59                          mouth           [place]



               00   :00                           every 12           Medical



                                                  (twelve)           Branch



                                                  hours for           



                                                  7 days.           



                                                  Indication           



                                                  s: acute           



                                                  pain           

 

     HYDROmorpho      -0 - No        4647 4mg       Take 1           Uni

vers



     ne 4 mg                                tablet by           ity of



     tablet      00:00: 04:59                          mouth           [place]



               00   :00                           every 12           Medical



                                                  (twelve)           Branch



                                                  hours for           



                                                  7 days.           



                                                  Indication           



                                                  s: acute           



                                                  pain           

 

     ciprofloxac      2022- No        [id number] 500mg      Take 1         

  [facility]



     in HCl 500                                tablet by           ity

 of



     mg tablet      00:00: 00:00                          mouth           [place]



               00   :00                           every 12           Medical



                                                  (twelve)           Branch



                                                  hours for           



                                                  10 days.           

 

     NaCl 0.9%      2022- No             1000mL      at 150           Uni

vers



     (NS) bolus                                mL/hr,           ity of



     infusion      23:15: 23:38                          1,000 mL,           Eric

as



     1,000 mL      00   :00                           IV             Medical



                                                  Piggyback,           Branch



                                                  ONCE, 1           



                                                  dose, On           



                                                  22 at           



                                                  1815, STAT           

 

     HYDROmorpho      2022- No             1mg       1 mg, Slow          

 [facility]



     ne                                  IV Push,           ity of



     (DILAUDID)      22:15: 21:46                          ONCE, 1           [facility]

as



     injection 1      00   :00                           dose, On           Medi

[place]



     mg                                           22 at           



                                                  1715,           



                                                  Routine<br           



                                                  >Use           



                                                  approved           



                                                  by             



                                                  (Faculty):           



                                                  ADC            



                                                  PROVIDER           

 

     enoxaparin            Yes            40mg      40 mg,           Unive

rs



     (LOVENOX)                                     Subcutaneo           ity 

of



     injection      22:00:                               us, DAILY,           [facility]

[place]



     40 mg      00                                 First dose           Medical



                                                  on Fri           Branch



                                                  22 at           



                                                  1700,           



                                                  Until           



                                                  Discontinu           



                                                  ed,            



                                                  Routine           

 

     glucagon            Yes            1mg       1 mg,           [facility]



     (GLUCAGEN                                     Intravenou           ity 

of



     DIAGNOSTIC      21:57:                               s, PRN -           [facility]

as



     KIT)      53                                 SEE            Medical



     injection 1                                         INSTRUCTIO           Br

anch



     mg                                           NS,            



                                                  Starting           



                                                  on 22 at           



                                                  1657,           



                                                  Until           



                                                  Discontinu           



                                                  ed,            



                                                  Routine,           



                                                  hypoglycem           



                                                  ia             

 

     glucagon            Yes            1mg       1 mg,           [facility]



     (GLUCAGEN                                     Intramuscu           ity 

of



     DIAGNOSTIC      21:57:                               lar, PRN,           [facility]

[place]



     KIT)      44                                 Starting           Medical



     injection 1                                         on Fri           Branch



     mg                                           22 at           



                                                  1657,           



                                                  Until           



                                                  Discontinu           



                                                  ed, ASAP,           



                                                  Blood           



                                                  Glucose           



                                                  &amp;lt;           



                                                  or = 70           



                                                  mg/dL and           



                                                  patient is           



                                                  unable to           



                                                  swallow or           



                                                  has mental           



                                                  changes.           

 

     HYDROmorpho      0      Yes            4mg       4 mg,           Unive

rs



     ne                                       Oral,           ity of



     (DILAUDID)      13:00:                               Q12H,           [place]



     tablet 4 mg      00                                 First dose           Me

dical



                                                  on Fri           Branch



                                                  22 at           



                                                  0800,           



                                                  Until           



                                                  Discontinu           



                                                  ed             

 

     insulin            Yes            15U       15 Units,           Unive

rs



     lispro                                     Subcutaneo           ity of



     (human)      12:30:                               us, TIDACMariana

s



     (HumaLOG      00                                 First dose           Medic

al



     U-100)                                         on Fri           Branch



     injection                                         22 at           



     15 Units                                         0730,           



                                                  Until           



                                                  Discontinu           



                                                  ed,            



                                                  Routine           

 

     amLODIPine            Yes            10mg      10 mg,           Unive

rs



     (NORVASC)                                     Oral,           ity of



     tablet 10      08:45:                               DAILY,           [place]



     mg        00                                 First dose           Medical



                                                  on Fri           Branch



                                                  22 at           



                                                  0345,           



                                                  Until           



                                                  Discontinu           



                                                  ed,            



                                                  Routine           

 

     ertapenem      2022- No             1000mg      1,000 mg,           

[facility]



     (INVANZ)       06-25                          IV             ity of



     1,000 mg in      08:45: 13:51                          Piggyback,          

 [place]



     NaCl 0.9%      00   :26                           Q24H ABX,           Medic

al



     (NS) 50 mL                                         First dose           Bra

nch



     MINI-BAG                                         on 22 at           



                                                  0345,           



                                                  Until           



                                                  Discontinu           



                                                  ed,            



                                                  Administer           



                                                  over 30           



                                                  Minutes,           



                                                  50             



                                                  mL<br>Reas           



                                                  on for           



                                                  Anti-Infec           



                                                  tive:           



                                                  Empiric           



                                                  Therapy           



                                                  for            



                                                  Suspected           



                                                  Infection<           



                                                  br>Empiric           



                                                  Therapy           



                                                  Site:           



                                                  Urine<br>D           



                                                  uration of           



                                                  therapy: 7           



                                                  days<br>Re           



                                                  stricted           



                                                  use            



                                                  approved           



                                                  by:            



                                                  History of           



                                                  ESBL           



                                                  infection           



                                                  in past 3           



                                                  months           

 

     hydralAZINE            Yes            10mg      10 mg,           Univ

ers



     (APRESOLINE      24                               Slow IV           ity o

f



     ) injection      08:33:                               Push,           [place]



     10 mg      28                                 Q6HPRN,           Medical



                                                  Starting           Branch



                                                  on 22 at           



                                                  0333,           



                                                  Until           



                                                  Discontinu           



                                                  ed,            



                                                  Routine,           



                                                  DBP=&gt;10           



                                                  0;             



                                                  SBP=>160,           



                                                  For SBP >           



                                                  160            

 

     acetaminoph            Yes            650mg      650 mg,           Un

[place]



     en                                       Oral,           ity of



     (TYLENOL)      07:37:                               Q6HPRN,           [place]



     tablet 650      37                                 Starting           Medic

al



     mg                                           on Fri           Branch



                                                  22 at           



                                                  0237,           



                                                  Until           



                                                  Discontinu           



                                                  ed,            



                                                  Routine,           



                                                  Pain           



                                                  (scale           



                                                  1-3)           

 

     iopamidol      2022- No        [id number] 100mL      100 mL,          

 [facility]



     (ISOVUE                                Intravenou           ity o

f



     370-500 mL)      06:15: 05:05                          s, ONCE, 1          

 [place]



     injection      00   :00                           dose, On           Med[facility]

l



     100 mL                                         Fri            [facility]



                                                  22 at           



                                                  0115,           



                                                  Routine           

 

     insulin      2022- No             10U       10 Units,           Univ

ers



     regular                                IV Push,           ity of



     human      06:00: 05:20                          ONCE, 1           [place]



     (HUMULIN R)      00   :00                           dose, On           Medi

[facility]



     injection                                         Fri            [facility]



     10 Units                                         22 at           



                                                  0100, ASAP           

 

     FENTanyl PF      2022- No             50ug      50 mcg,           Un

[facility]



     (SUBLIMAZE                                Slow IV           ity o

f



     (PF))      05:45: 04:47                          Push,           [place]



     injection      00   :00                           ONCE, 1           Medical



     50 mcg                                         dose, On           [facility]



                                                  Fri            



                                                  22 at           



                                                  0045, STAT           

 

     FENTanyl PF      2022- No             50ug      50 mcg,           Un

[facility]



     (SUBLIMAZE                                Slow IV           ity o

f



     (PF))      04:15: 04:00                          Push,           [place]



     injection      00   :00                           ONCE, 1           Medical



     50 mcg                                         dose, On           [facility]



                                                  u            



                                                  22 at           



                                                  2315, STAT           

 

     NaCl 0.9%      2022- No             1000mL      at 999           Uni

vers



     (NS) bolus                                mL/hr,           ity of



     infusion      04:15: 06:41                          1,000 mL,           [facility]

as



     1,000 mL      00   :00                           IV             Medical



                                                  Infusion,           [facility]



                                                  ONCE, 1           



                                                  dose, On           



                                                  Thu            



                                                  22 at           



                                                  2315, STAT           

 

     hydromorpho            Yes            4ug       Take 4 mcg           

[facility]



     ne HCl      6-10                               by mouth           ity of



     (HYDROMORPH      19:50:                               every 12           [facility]

[facility]



     ONE ORAL)      08                                 (twelve)           Medica

l



                                                  hours.           [place]

 

     magnesium            Yes            400mg      400 mg,           Univ

ers



     oxide      6-10                               Oral,           ity of



     (MAG-OX      14:00:                               DAILY,           [place]



     400) tablet      00                                 First dose           Me

dical



     400 mg                                         on Fri           [place]



                                                  6/10/22 at           



                                                  0900,           



                                                  Until           



                                                  Discontinu           



                                                  ed,            



                                                  Routine           

 

     HYDROmorpho      2022- No             1mg       1 mg, Slow          

 [facility]



     ne        6-10 06-10                          IV Push,           ity of



     (DILAUDID)      05:15: 04:42                          ONCE, 1           [facility]

as



     injection 1      00   :00                           dose, On           Medi

sar[facility]



     mg                                           Fri            [place]



                                                  6/10/22 at           



                                                  0015,           



                                                  Routine<br           



                                                  >Use           



                                                  approved           



                                                  by             



                                                  (Faculty):           



                                                  ADC            



                                                  PROVIDER           

 

     acetaminoph            Yes            1000mg      1,000 mg,          

 [facility]



     en        6-10                               Oral, Q8H,           ity of



     (TYLENOL)      03:00:                               First dose           [facility]

[facility]



     tablet      00                                 on Thu           Medical



     1,000 mg                                         22 at           [facility]



                                                  2200,           



                                                  Until           



                                                  Discontinu           



                                                  ed,            



                                                  Routine           

 

     insulin            Yes       [id number] 52U       inject 52           U

nivers



     glargine      6-10                               Units           ity of



     100 unit/mL      00:00:                               under the           T

[facility]



     injection      00                                 skin at           Medical



                                                  bedtime.           [place]

 

     insulin      0      Yes        52U       inject 52           U

nivers



     glargine      6-10                               Units           ity of



     100 unit/mL      00:00:                               under the           T

[facility]



     injection      00                                 skin at           Medical



                                                  bedtime.           [place]

 

     insulin      0      Yes        52U       inject 52           U

nivers



     glargine      6-10                               Units           ity of



     100 unit/mL      00:00:                               under the           T

[facility]



     injection      00                                 skin at           Medical



                                                  bedtime.           [place]

 

     insulin      0      Yes       [id number] 52U       inject 52           U

nivers



     glargine      6-10                               Units           ity of



     100 unit/mL      00:00:                               under the           T

ex[facility]



     injection      00                                 skin at           Medical



                                                  bedtime.           [place]

 

     insulin      0      Yes       [id number] 52U       inject 52           U

nivers



     glargine      6-10                               Units           ity of



     100 unit/mL      00:00:                               under the           T

[facility]



     injection      00                                 skin at           Medical



                                                  bedtime.           [facility]

 

     insulin      -0      Yes       [id number] 52U       inject 52           U

nivers



     glargine      6-10                               Units           ity of



     100 unit/mL      00:00:                               under the           T

[facility]



     injection      00                                 skin at           Medical



                                                  bedtime.           [facility]

 

     insulin      -0      Yes       [id number] 52U       inject 52           U

nivers



     glargine      6-10                               Units           ity of



     100 unit/mL      00:00:                               under the           T

[facility]



     injection      00                                 skin at           Medical



                                                  bedtime.           [facility]

 

     insulin      -0      Yes       [id number] 52U       inject 52           U

nivers



     glargine      6-10                               Units           ity of



     100 unit/mL      00:00:                               under the           T

[facility]



     injection      00                                 skin at           Medical



                                                  bedtime.           [facility]

 

     insulin      -0 - No        [id number] 52U       inject 52           

[facility]



     glargine      6-10 07-30                          Units           ity of



     100 unit/mL      00:00: 00:00                          under the           

[place]



     injection      00   :00                           skin at           Medical



                                                  bedtime.           [facility]

 

     insulin      -0 - No        [id number] 52U       inject 52           

[facility]



     glargine      6-10 07-30                          Units           ity of



     100 unit/mL      00:00: 00:00                          under the           

[place]



     injection      00   :00                           skin at           Medical



                                                  bedtime.           [facility]

 

     Insulin      -2022- No        [id number]           Use as           Uni

vers



     Safety      6-10 07-09                          directed           ity of



     [place],      00:00: 04:59                                         [place]



     Disp, 29      00   :00                                          Medical



     gauge x                                                        [facility]



     3/16" Ndle                                                        

 

     Insulin      -2022- No        [id number]           Use as           Uni

vers



     Safety      6-10 07-09                          directed           ity of



     [place],      00:00: 04:59                                         [place]



     Disp, 29      00   :00                                          Medical



     gauge x                                                        [facility]



     3/16" Ndle                                                        

 

     Insulin      -0 - No        [id number]           Use as           Uni

vers



     Safety      6-10 07-09                          directed           ity of



     [place],      00:00: 04:59                                         [place]



     Disp, 29      00   :00                                          Medical



     gauge x                                                        [facility]



     3/16" Ndle                                                        

 

     Insulin      -0 - No        [id number]           Use as           Uni

vers



     Safety      6-10 07-09                          directed           ity of



     [place],      00:00: 04:59                                         [place]



     Disp, 29      00   :00                                          Medical



     gauge x                                                        [facility]



     3/16" Ndle                                                        

 

     Insulin      2022022- No        [id number]           Use as           Uni

vers



     Safety      6-10 07-09                          directed           ity of



     [place],      00:00: 04:59                                         [place]



     Disp, 29      00   :00                                          Medical



     gauge x                                                        [facility]



     3/16" Ndle                                                        

 

     Insulin      2022- No        [id number]           Use as           Uni

vers



     Safety      6-10 07-09                          directed           ity of



     [place],      00:00: 04:59                                         [place]



     Disp, 29      00   :00                                          Medical



     gauge x                                                        [facility]



     3/16" Ndle                                                        

 

     Insulin      2022- No        [id number]           Use as           Uni

vers



     Safety      6-10 07-09                          directed           ity of



     [place],      00:00: 04:59                                         [place]



     Disp, 29      00   :00                                          Medical



     gauge x                                                        [facility]



     3/16" Ndle                                                        

 

     fluconazole      2022- No        [id number] 200mg      Take 1         

  [facility]



     200 mg      6-10 06-23                          tablet by           ity of



     tablet      00:00: 04:59                          mouth           [place]



               00   :00                           daily for           Medical



                                                  12 days.           [facility]

 

     magnesium      2022- No             4g        4 g, IV           Univ

ers



     sulfate in                                Piggyback,           it

y of



     water 4      20:45: 21:58                          ONCE, 1           [place]



     gram/50 mL      00   :00                           dose, On           Medic

al



     (8 %) IV                                         Thu 22           [facility]

h



     Piggyback 4                                         at 1545,           



     g                                            Routine           

 

     HYDROmorpho            Yes            4mg       4 mg,           Unive

rs



     ne                                       Oral,           ity of



     (DILAUDID)      19:32:                               Q6HPRN,           [place]

[facility]



     tablet 4 mg      47                                 Starting           Medi

[facility]



                                                  on Thu           [facility]



                                                  22 at           



                                                  1432,           



                                                  Until           



                                                  Discontinu           



                                                  ed,            



                                                  Routine,           



                                                  Pain           



                                                  (scale           



                                                  7-10)           

 

     insulin            Yes            30U       30 Units,           Unive

rs



     glargine                                     Subcutaneo           ity o

f



     (LANTUS      02:00:                               us, [facility],           [place]



     U-100)      00                                 First dose           Medical



     injection                                         on Wed           [facility]



     30 Units                                         22 at           



                                                  2100,           



                                                  Until           



                                                  Discontinu           



                                                  ed,            



                                                  Routine           

 

     HYDROmorpho      2022- No             .5mg      0.5 mg,           Un

[facility]



     ne         06-09                          Slow IV           ity of



     (DILAUDID)      16:28: 19:33                          Push,           [place]



     injection      03   :06                           Q4HPRN,           Medical



     0.5 mg                                         Starting           [facility]



                                                  on 22 at           



                                                  1128,           



                                                  Until Thu           



                                                  22 at           



                                                  1433,           



                                                  Routine,           



                                                  Pain           



                                                  (scale           



                                                  7-10)<br>U           



                                                  se             



                                                  approved           



                                                  by             



                                                  (Faculty):           



                                                  ADC            



                                                  PROVIDER           

 

     fluconazole            Yes            200mg      200 mg,           Un

[facility]



     (DIFLUCAN)                                     Oral,           ity of



     tablet 200      14:45:                               DAILY,           [place]



     mg        00                                 First dose           Medical



                                                  on Wed           [facility]



                                                  22 at           



                                                  0945,           



                                                  Until           



                                                  Discontinu           



                                                  ed,            



                                                  ASAP<br>Re           



                                                  ason for           



                                                  Anti-Infec           



                                                  tive:           



                                                  Empiric           



                                                  Therapy           



                                                  for            



                                                  Suspected           



                                                  Infection<           



                                                  br>Empiric           



                                                  Therapy           



                                                  Site:           



                                                  Urine<br>D           



                                                  uration of           



                                                  therapy:           



                                                  72 hours           

 

     enoxaparin            Yes            40mg      40 mg,           Unive

rs



     (LOVENOX)                                     Subcutaneo           ity 

of



     injection      14:00:                               us, DAILY,           [facility]

[facility]



     40 mg      00                                 First dose           Medical



                                                  on Wed           [facility]



                                                  22 at           



                                                  0900,           



                                                  Until           



                                                  Discontinu           



                                                  ed,            



                                                  Routine           

 

     tamsulosin            Yes            .4mg      0.4 mg,           Univ

ers



     (FLOMAX)                                     Oral,           ity of



     capsule 0.4      14:00:                               [place]ILY,           [place]

[place]



     mg        00                                 First dose           Medical



                                                  on Wed           [facility]



                                                  22 at           



                                                  0900,           



                                                  Until           



                                                  Discontinu           



                                                  ed,            



                                                  Routine           

 

     insulin       No             10U       10 Units,           Univ

ers



     glargine                                Subcutaneo           ity 

of



     (LANTUS      12:00: 12:51                          us, ONCE,           [place]

[facility]



     U-100)      00   :00                           1 dose, On           Medical



     injection                                         22           [facility]

ch



     10 Units                                         at 0700,           



                                                  Routine           

 

     HYDROmorpho       No             1mg       1 mg, Slow          

 [facility]



     ne                                  IV Push,           ity of



     (DILAUDID)      10:00: 09:23                          ONCE, 1           [facility]

as



     injection 1      00   :00                           dose, On           Medi

[facility]



     mg                                           22           [facility]



                                                  at 0500,           



                                                  Routine<br           



                                                  >Use           



                                                  approved           



                                                  by             



                                                  (Faculty):           



                                                  ADC            



                                                  PROVIDER           

 

     HYDROmorpho       No             4mg       4 mg,           Univ

ers



     ne                                  Oral,           ity of



     (DILAUDID)      08:40: 16:28                          [id number],           Te

[facility]



     tablet 4 mg      04   :14                           Starting           Medi

sarah



                                                  on Wed           [facility]



                                                  22 at           



                                                  0340,           



                                                  Until 22 at           



                                                  1128,           



                                                  Routine,           



                                                  Pain           



                                                  (scale           



                                                  7-10)           

 

     HYDROmorpho      2022- No             1mg       1 mg, Slow          

 [facility]



     ne                                  IV Push,           ity of



     (DILAUDID)      06:15: 05:41                          ONCE, 1           [facility]

as



     injection 1      00   :00                           dose, On           Medi

sarah



     mg                                           Wed 22           [facility]



                                                  at 0115,           



                                                  Routine<br           



                                                  >Use           



                                                  approved           



                                                  by             



                                                  (Faculty):           



                                                  [facility]            



                                                  PROVIDER           

 

     aztreonam      2022- No             1000mg      1,000 mg,           

[facility]



     (AZACTAM)      6-08 06-10                          IV             ity of



     1,000 mg in      05:30: 16:58                          Piggyback,          

 [place]



     NaCl 0.9%      00   :42                           Q8H ABX,           [facility]

l



     (NS) 100 mL                                         First dose           Br

anch



     MINI-BAG                                         on 22 at           



                                                  0030,           



                                                  Until           



                                                  Discontinu           



                                                  ed,            



                                                  Administer           



                                                  over 30           



                                                  Minutes,           



                                                  100            



                                                  mL<br>Reas           



                                                  on for           



                                                  Anti-Infec           



                                                  tive:           



                                                  Empiric           



                                                  Therapy           



                                                  for            



                                                  Suspected           



                                                  Infection<           



                                                  br>Empiric           



                                                  Therapy           



                                                  Site:           



                                                  Urine<br>D           



                                                  uration of           



                                                  therapy: 7           



                                                  days           

 

     Sliding            Yes                      Subcutaneo           Univ

ers



     Scale      6-08                               us, Q4H,           ity of



     Insulin-Reg      05:00:                               First dose           

[place]



     ular + Fsbg      00                                 on Wed           Med[facility]

l



     Testing                                         22 at           [facility]



                                                  0000,           



                                                  Until           



                                                  Discontinu           



                                                  ed,            



                                                  Routine           

 

     NaCl 0.9%      2022- No             1000mL      at 125           Uni

vers



     (NS) IV      08                          mL/hr, IV           ity of



     infusion      04:30: 16:28                          Infusion,           [facility]

as



     1,000 mL      00   :27                           CONTINUOUS           Medic

al



                                                  , Starting           [facility]



                                                  on 22 at           



                                                  2330,           



                                                  Until 22 at           



                                                  1128,           



                                                  Routine           

 

     glucagon            Yes            1mg       1 mg,           [facility]



     (GLUCAGEN                                     Intravenou           ity 

of



     DIAGNOSTIC      04:10:                               s, PRN -           [facility]

as



     KIT)      53                                 SEE            Medical



     injection 1                                         INSTRUCTIO           Br

anch



     mg                                           NS,            



                                                  Starting           



                                                  on 22 at           



                                                  2310,           



                                                  Until           



                                                  Discontinu           



                                                  ed,            



                                                  Routine,           



                                                  For Blood           



                                                  glucose <           



                                                  70             

 

     proCHLORper      2022-0      Yes            10mg      10 mg,           Univ

ers



     azine                                     Slow IV           ity of



     (COMPAZINE)      04:10:                               Push,           [place]



     injection      40                                 Q6HPRN,           Medical



     10 mg                                         Starting           [facility]



                                                  on 22 at           



                                                  2310,           



                                                  Until           



                                                  Discontinu           



                                                  ed,            



                                                  Routine,           



                                                  Nausea and           



                                                  Vomiting           



                                                  (N/V)           

 

     FENTanyl PF      2022- No             50ug      50 mcg,           Un

[facility]



     (SUBLIMAZE                                Slow IV           ity o

f



     (PF))      03:04: 03:37                          Push,           [place]



     injection      00   :00                           ONCE, 1           Medical



     50 mcg                                         dose, On           [facility]



                                                  22           



                                                  at 2215,           



                                                  STAT           

 

     NaCl 0.9%      2022- No             1000mL      at 999           Uni

vers



     (NS) IV      08                          mL/hr,           ity of



     infusion      01:16: 01:54                          Intravenou           [facility]

[facility]



     1,000 mL      00   :00                           s, ONCE, 1           Medic

al



                                                  dose, On           [facility]



                                                  22           



                                                  at 2030,           



                                                  Routine           

 

     insulin      2022- No             .1U/kg      13.7 Units           U

nivers



     regular                                (rounded           ity of



     human      01:15: 01:51                          from 13.74           [place]



     (HUMULIN R)      00   :00                           Units =           Medic

al



     injection                                         0.1            [facility]



     13.7 Units                                         Units/kg           



                                                  ?137.4           



                                                  kg), Slow           



                                                  IV Push,           



                                                  ONCE, 1           



                                                  dose, On           



                                                  22           



                                                  at 2030,           



                                                  STAT           

 

     FENTanyl PF      2022- No             50ug      50 mcg,           Un

[facility]



     (SUBLIMAZE                                Slow IV           ity o

f



     (PF))      01:00: 01:52                          Push,           [place]



     injection      00   :00                           ONCE, 1           Medical



     50 mcg                                         dose, On           [facility]



                                                  22           



                                                  at 2015,           



                                                  ASAP           

 

     NaCl 0.9%      2022- No             1000mL      at 999           Uni

vers



     (NS) IV       06-08                          mL/hr,           ity of



     infusion      23:47: 00:53                          Intravenou           Te

[facility]



     1,000 mL      00   :00                           s, ONCE, 1           Medic

al



                                                  dose, On           [facility]



                                                  22           



                                                  at 1900,           



                                                  ASAP           

 

     hydromorpho            Yes            4ug       Take 4 mcg           

[facility]



     ne HCl                                     by mouth           ity of



     (HYDROMORPH      23:17:                               every 12           Te

x[facility]



     ONE ORAL)      17                                 (twelve)           Medica

l



                                                  hours.           [facility]

 

     hydromorpho            Yes            4ug       Take 4 mcg           

[facility]



     ne HCl                                     by mouth           [facility] of



     (HYDROMORPH      18:29:                               every 12           Te

[facility]



     ONE ORAL)      31                                 (twelve)           [facility]

l



                                                  hours.           [facility]

 

     insulin            Yes        15U       inject 15           U

nivers



     lispro,      6-06                               Units           ity of



     human, 100      00:00:                               under the           Te

[facility]



     unit/mL      00                                 skin 3           Medical



     injection                                         (three)           [facility]



                                                  times           



                                                  daily           



                                                  before           



                                                  meals.           

 

     insulin            Yes        15U       inject 15           U

nivers



     lispro,      6-06                               Units           ity of



     human, 100      00:00:                               under the           Te

[facility]



     unit/mL      00                                 skin 3           Medical



     injection                                         (three)           [facility]



                                                  times           



                                                  daily           



                                                  before           



                                                  meals.           

 

     insulin            Yes        15U       inject 15           U

nivers



     lispro,      6-06                               Units           ity of



     human, 100      00:00:                               under the           Te

[facility]



     unit/mL      00                                 skin 3           Medical



     injection                                         (three)           [facility]



                                                  times           



                                                  daily           



                                                  before           



                                                  meals.           

 

     insulin            Yes        15U       inject 15           U

nivers



     lispro,      6-06                               Units           ity of



     human, 100      00:00:                               under the           Te

[facility]



     unit/mL      00                                 skin 3           Medical



     injection                                         (three)           [facility]



                                                  times           



                                                  daily           



                                                  before           



                                                  meals.           

 

     insulin      0      Yes        15U       inject 15           U

nivers



     lispro,      6-06                               Units           ity of



     human, 100      00:00:                               under the           Te

[facility]



     unit/mL      00                                 skin 3           Medical



     injection                                         (three)           [facility]



                                                  times           



                                                  daily           



                                                  before           



                                                  meals.           

 

     insulin      0      Yes        15U       inject 15           U

nivers



     lispro,      6-06                               Units           ity of



     human, 100      00:00:                               under the           Te

[facility]



     unit/mL      00                                 skin 3           Medical



     injection                                         (three)           [facility]



                                                  times           



                                                  daily           



                                                  before           



                                                  meals.           

 

     insulin            Yes        52U       inject 52           U

nivers



     glargine      6-06                               Units           ity of



     100 unit/mL      00:00:                               under the           T

[facility]



     injection      00                                 skin at           Medical



                                                  bedtime.           [facility]

 

     insulin            Yes        15U       inject 15           U

nivers



     lispro,      6-06                               Units           ity of



     human, 100      00:00:                               under the           Te

[facility]



     unit/mL      00                                 skin 3           Medical



     injection                                         (three)           [facility]



                                                  times           



                                                  daily           



                                                  before           



                                                  meals.           

 

     insulin            Yes       [id number] 52U       inject 52           U

nivers



     glargine      6-06                               Units           ity of



     100 unit/mL      00:00:                               under the           T

[facility]



     injection      00                                 skin at           Medical



                                                  bedtime.           [facility]

 

     insulin            Yes       [id number] 15U       inject 15           U

nivers



     lispro,      6-06                               Units           ity of



     human, 100      00:00:                               under the           Te

[facility]



     unit/mL      00                                 skin 3           Medical



     injection                                         (three)           [facility]



                                                  times           



                                                  daily           



                                                  before           



                                                  meals.           

 

     insulin            Yes       [id number] 15U       inject 15           U

nivers



     lispro,      6-06                               Units           ity of



     human, 100      00:00:                               under the           Te

[facility]



     unit/mL      00                                 skin 3           Medical



     injection                                         (three)           [facility]



                                                  times           



                                                  daily           



                                                  before           



                                                  meals.           

 

     insulin            Yes       [id number] 15U       inject 15           U

nivers



     lispro,      6-06                               Units           ity of



     human, 100      00:00:                               under the           Te

[facility]



     unit/mL      00                                 skin 3           Medical



     injection                                         (three)           [facility]



                                                  times           



                                                  daily           



                                                  before           



                                                  meals.           

 

     insulin      2022- No        [id number] 15U       inject 15           

[facility]



     lispro,      6- 07-30                          Units           ity of



     human, 100      00:00: 00:00                          under the           T

[facility]



     unit/mL      00   :00                           skin 3           Medical



     injection                                         (three)           [facility]



                                                  times           



                                                  daily           



                                                  before           



                                                  meals.           

 

     insulin      2022- No        [id number] 15U       inject 15           

[facility]



     lispro,      6-06 07-30                          Units           ity of



     human, 100      00:00: 00:00                          under the           T

[facility]



     unit/mL      00   :00                           skin 3           Medical



     injection                                         (three)           [facility]



                                                  times           



                                                  daily           



                                                  before           



                                                  meals.           

 

     insulin      2022- No        [id number] 52U       inject 52           

[facility]



     glargine       06-10                          Units           ity of



     100 unit/mL      00:00: 00:00                          under the           

[place]



     injection      00   :00                           skin at           Medical



                                                  bedtime.           [facility]

 

     HYDROmorpho      2022- No             .5mg      0.5 mg,           Un

[facility]



     ne        -06                          Slow IV           ity of



     (DILAUDID)      18:15: 18:14                          Push,           [place]



     injection      00   :00                           Q8HPRN,           Medical



     0.5 mg                                         Starting           [facility]



                                                  on Sun           



                                                  22 at           



                                                  1315,           



                                                  Until Mon           



                                                  22 at           



                                                  1314,           



                                                  Routine,           



                                                  Pain           



                                                  (scale           



                                                  7-10)<br>U           



                                                  se             



                                                  approved           



                                                  by             



                                                  (Faculty):           



                                                  [facility]            



                                                  PROVIDER           

 

     insulin      0      Yes            .4U/kg/      52 Units           Uni

vers



     glargine      6-05                     d         (rounded           ity of



     (LANTUS      14:43:                               from 51.6           [place]



     U-100)      43                                 Units =           Medical



     injection                                         0.4            [facility]



     52 Units                                         Units/kg/d           



                                                  ay ?129           



                                                  kg),           



                                                  Subcutaneo           



                                                  us, Q24H,           



                                                  First dose           



                                                  on Sun           



                                                  22 at           



                                                  0944,           



                                                  Until           



                                                  Discontinu           



                                                  ed,            



                                                  Routine           

 

     HYDROmorpho            Yes            4mg       4 mg,           Unive

rs



     ne        6-05                               Oral,           ity of



     (DILAUDID)      02:21:                               Q6HPRN,           [place]

[facility]



     tablet 4 mg      55                                 Starting           Medi

[facility]



                                                  on Sat           [facility]



                                                  22 at           



                                                  ,           



                                                  Until           



                                                  Discontinu           



                                                  ed,            



                                                  Routine,           



                                                  Pain           



                                                  (scale           



                                                  4-6)           

 

     HYDROmorpho      2022- No             1mg       1 mg, Slow          

 [facility]



     ne         06-05                          IV Push,           ity of



     (DILAUDID)      02:21: 18:05                          Q4HPRN,           Eric

as



     injection 1      39   :59                           Starting           Medi

sarah



     mg                                           on Sat           Branch



                                                  22 at           



                                                  2121,           



                                                  Until Sun           



                                                  22 at           



                                                  1305,           



                                                  Routine,           



                                                  Pain           



                                                  (scale           



                                                  7-10)<br>U           



                                                  se             



                                                  approved           



                                                  by             



                                                  (Faculty):           



                                                  [facility]            



                                                  PROVIDER           

 

     Sliding            Yes                      Subcutaneo           Univ

ers



     Scale      6-04                               us, Q4H,           ity of



     Insulin -      17:00:                               First dose           [place]

[place]



     lispro      00                                 on Sat           Medical



     (humaLOG) +                                         22 at           [facility]



     Fsbg                                         1200,           



     Testing                                         Until           



                                                  Discontinu           



                                                  ed,            



                                                  Routine           

 

     insulin            Yes            .35U/kg      15 Units           Uni

vers



     lispro      6-04                     /d        (rounded           ity of



     (human)      17:00:                               from 15.05           [place]

s



     (HumaLOG      00                                 Units =           Medical



     U-100)                                         0.35           Branch



     injection                                         Units/kg/d           



     15 Units                                         ay ?129           



                                                  kg),           



                                                  Subcutaneo           



                                                  us, TID           



                                                  MEALS,           



                                                  First dose           



                                                  on Sat           



                                                  22 at           



                                                  1200,           



                                                  Until           



                                                  Discontinu           



                                                  ed,            



                                                  Routine           

 

     proMETHazin            Yes            25mg      25 mg,           Univ

ers



     e         6-04                               Oral,           ity of



     (PHENERGAN)      15:09:                               Q6HPRN,           Eric

as



     tablet 25      59                                 Starting           Medica

l



     mg                                           on Sat           Branch



                                                  22 at           



                                                  1009,           



                                                  Until           



                                                  Discontinu           



                                                  ed,            



                                                  Routine,           



                                                  Nausea and           



                                                  Vomiting           



                                                  (N/V)           

 

     HYDROmorpho      2022- No             4mg       4 mg,           Univ

ers



     ne         06-05                          Oral,           ity of



     (DILAUDID)      14:47: 02:22                          Q6HPRN,           Eric

as



     tablet 4 mg      19   :08                           Starting           Medi

sarah



                                                  on Sat           Branch



                                                  22 at           



                                                  0947,           



                                                  Until Sat           



                                                  22 at           



                                                  2122,           



                                                  Routine,           



                                                  Pain           



                                                  (scale           



                                                  7-10)           

 

     potassium      -2022- No             20meq      20 mEq, IV           

[facility]



     chloride 20      04                          Piggyback,           i

ty of



     mEq/100 mL      23:45: 03:11                          Q2H, 2           [place]

[facility]



     (KCL) 20      00   :00                           doses,           Medical



     mEq/100 mL                                         First dose           Bra

nch



     RTU IVPB 20                                         on Fri           



     mEq                                          6/3/22 at           



                                                  1845, Last           



                                                  dose on           



                                                  Fri 6/3/22           



                                                  at 2000,           



                                                  100 mL           

 

     HYDROmorpho      2022- No             1mg       1 mg, Slow          

 [facility]



     ne        04                          IV Push,           ity of



     (DILAUDID)      18:08: 14:45                          Q4HPRN,           Eric

as



     injection 1      31   :34                           Starting           Medi

sarah



     mg                                           on Fri           Branch



                                                  6/3/22 at           



                                                  1308,           



                                                  Until Sat           



                                                  22 at           



                                                  0945,           



                                                  Routine,           



                                                  Pain           



                                                  (scale           



                                                  7-10)<br>U           



                                                  se             



                                                  approved           



                                                  by             



                                                  (Faculty):           



                                                  [facility]            



                                                  PROVIDER           

 

     enoxaparin            Yes            40mg      40 mg,           Unive

rs



     (LOVENOX)                                     Subcutaneo           ity 

of



     injection      14:00:                               us, DAILY,           [facility]

[place]



     40 mg      00                                 First dose           Medical



                                                  on Fri           Branch



                                                  6/3/22 at           



                                                  0900,           



                                                  Until           



                                                  Discontinu           



                                                  ed,            



                                                  Routine           

 

     lactobacill            Yes            .5mg      0.5 mg,           Uni

vers



     us                                       Oral, BID,           ity of



     acidophilus      13:00:                               First dose           

[place]



     tablet 0.5      00                                 on Fri           Medical



     mg                                           6/3/22 at           Branch



                                                  0800,           



                                                  Until           



                                                  Discontinu           



                                                  ed,            



                                                  Routine           

 

     docusate            Yes            100mg      100 mg,           Unive

rs



     (COLACE)                                     Oral, BID,           ity o

f



     capsule 100      13:00:                               First dose           

[place]



     mg        00                                 on Fri           Medical



                                                  6/3/22 at           Branch



                                                  0800,           



                                                  Until           



                                                  Discontinu           



                                                  ed,            



                                                  Routine           

 

     cefTRIAXone       No             1000mg      1,000 mg,         

  [facility]



     (ROCEPHIN)                                IV             ity of



     1,000 mg in      11:30: 16:04                          Piggyback,          

 [place]



     NaCl 0.9%      00   :40                           Q24H ABX,           Medic

al



     (NS) 50 mL                                         First dose           Bra

nch



     MINI-BAG                                         on Fri           



                                                  6/3/22 at           



                                                  0630,           



                                                  Until           



                                                  Discontinu           



                                                  ed,            



                                                  Administer           



                                                  over 30           



                                                  Minutes,           



                                                  50             



                                                  mL<br>Reas           



                                                  on for           



                                                  Anti-Infec           



                                                  tive:           



                                                  Documented           



                                                  Infection<           



                                                  br>Documen           



                                                  huma            



                                                  Infection           



                                                  Site:           



                                                  Urine<br>D           



                                                  uration of           



                                                  Therapy: 7           



                                                  days           

 

     proMETHazin       No             25mg      25 mg, IV           

[facility]



     e                                   Piggyback,           ity of



     (PHENERGAN)      11:22: 15:10                          Q6HPRN,           [facility]

[place]



     25 mg in      38   :15                           Starting           Medical



     NaCl 0.9%                                         on Fri           Branch



     (NS) 50 mL                                         6/3/22 at           



     IV                                           0622,           



     piggyback                                         Until Sat           



                                                  22 at           



                                                  1010,           



                                                  Routine,           



                                                  Nausea and           



                                                  Vomiting           



                                                  (N/V)           

 

     acetaminoph            Yes            650mg      650 mg,           Un

[place]



     en                                       Oral,           ity of



     (TYLENOL)      11:04:                               Q6HPRN,           [place]



     tablet 650      03                                 Starting           Medic

al



     mg                                           on Fri           Branch



                                                  6/3/22 at           



                                                  0604,           



                                                  Until           



                                                  Discontinu           



                                                  ed,            



                                                  Routine,           



                                                  Pain           



                                                  (scale           



                                                  1-3), Temp           



                                                  > 38.5 C           

 

     HYDROmorpho       No             2mg       2 mg, Slow          

 [facility]



     ne                                  IV Push,           ity of



     (DILAUDID)      11:03: 18:08                          Q4HPRN,           Eric

as



     injection 2      22   :44                           Starting           Medi

sarah



     mg                                           on Fri           Branch



                                                  6/3/22 at           



                                                  0603,           



                                                  Until Fri           



                                                  6/3/22 at           



                                                  1308,           



                                                  Routine,           



                                                  Pain           



                                                  (scale           



                                                  7-10)<br>U           



                                                  se             



                                                  approved           



                                                  by             



                                                  (Faculty):           



                                                  [facility]            



                                                  PROVIDER           

 

     NaCl 0.9%            Yes            10mL      10 mL,           Univer

s



     (NS)                                     Slow IV           ity of



     injection      10:58:                               Push, PRN,           Te

[place]



     10 mL      34                                 Starting           Medical



                                                  on Fri           Branch



                                                  6/3/22 at           



                                                  0558,           



                                                  Until           



                                                  Discontinu           



                                                  ed,            



                                                  Routine,           



                                                  line           



                                                  maintenanc           



                                                  e              

 

     NaCl 0.45%      2022- No             1000mL                     Univ

ers



     (1/2NS)                                               ity of



     1000 mL +      10:30: 14:45                                         [place]



     KCL 20 mEq      00   :34                                          Medical



                                                                 Branch

 

     D5W 0.45%      2022- No                       IV             [facility]



     NaCl                                Infusion,           ity of



     (1/2NS) 1 L      10:21: 14:45                          at 200           Eric

as



     + KCL 20      33   :34                           mL/hr, PRN           Medic

al



     mEq                                          - SEE           Branch



                                                  INSTRUCTIO           



                                                  NS,            



                                                  Starting           



                                                  on Fri           



                                                  6/3/22 at           



                                                  0521,           



                                                  Until Sat           



                                                  22 at           



                                                  0945,           



                                                  ASAP,           



                                                  Blood           



                                                  glucose           



                                                  control           

 

     acetaminoph       No             1000mg      1,000 mg,         

  [facility]



     en                                  Oral,           ity of



     (TYLENOL)      08:45: 07:36                          ONCE, 1           [place]

[facility]



     tablet      00   :00                           dose, On           Medical



     1,000 mg                                         Fri 6/3/22           Branc

h



                                                  at 0345,           



                                                  ASAP           

 

     insulin      2022- No             8U        8 Units,           Unive

rs



     regular                                Slow IV           ity of



     human      08:15: 07:13                          Push,           [place]



     (HUMULIN R)      00   :00                           ONCE, 1           Medic

al



     injection 8                                         dose, On           Bran

ch



     Units                                         Fri 6/3/22           



                                                  at 0315,           



                                                  Routine           

 

     NaCl 0.9%      2022- No             1000mL      at 999           Uni

vers



     (NS) bolus                                mL/hr,           ity of



     infusion      07:15: 06:12                          1,000 mL,           Eric

as



     1,000 mL      00   :00                           IV             Medical



                                                  Infusion,           Branch



                                                  ONCE, 1           



                                                  dose, On           



                                                  Fri 6/3/22           



                                                  at 0215,           



                                                  STAT           

 

     insulin      2022- No             8U        8 Units,           Unive

rs



     regular                                Slow IV           ity of



     human      07:15: 06:21                          Push,           [place]



     (HUMULIN R)      00   :00                           ONCE, 1           Medic

al



     injection 8                                         dose, On           Bran

ch



     Units                                         Fri 6/3/22           



                                                  at 0215,           



                                                  STAT           

 

     iopamidol      2022- No        [id number] 100mL      100 mL,          

 [facility]



     (ISOVUE                                Intravenou           ity o

f



     370-500 mL)      05:45: 04:31                          s, ONCE, 1          

 [place]



     injection      00   :00                           dose, On           Medica

l



     100 mL                                         Fri 6/3/22           Branch



                                                  at 0045,           



                                                  Routine           

 

     meropenem       No             500mg      500 mg, IV           

[facility]



     (MERREM)                                Piggyback,           ity 

of



     500 mg in      05:00: 05:32                          ONCE, 1           [place]

s



     NaCl 0.9%      00   :00                           dose, On           Medica

l



     (NS) 50 mL                                         Fri 6/3/22           [facility]



     MINI-BAG                                         at 0000,           



                                                  Administer           



                                                  over 30           



                                                  Minutes,           



                                                  50             



                                                  mL<br&gt;R           



                                                  estricted           



                                                  use            



                                                  approved           



                                                  by:            



                                                  EMERGENCY           



                                                  DEPARTMENT           



                                                  PRESCRIBER           



                                                  <br>Reason           



                                                  for            



                                                  Anti-Infec           



                                                  tive:           



                                                  Empiric           



                                                  Therapy           



                                                  for            



                                                  Suspected           



                                                  Infection<           



                                                  br>Empiric           



                                                  Therapy           



                                                  Site:           



                                                  Abdominal<           



                                                  br>Duratio           



                                                  n of           



                                                  therapy:           



                                                  72 hours           

 

     proMETHazin       No             25mg      25 mg, IV           

[facility]



     e                                   Piggyback,           ity of



     (PHENERGAN)      04:45: 03:49                          ONCE, 1           Te

[facility]



     25 mg in      00   :00                           dose, On           Medical



     NaCl 0.9%                                         Thu 22           [facility]

ch



     (NS) 50 mL                                         at 2345,           



     IV                                           ASAP           



     piggyback                                                        

 

     NaCl 0.9%      2022- No             1000mL      at 999           Uni

vers



     (NS) bolus                                mL/hr,           ity of



     infusion      04:30: 06:04                          1,000 mL,           Eric

as



     1,000 mL      00   :00                           IV             Medical



                                                  Infusion,           Branch



                                                  ONCE, 1           



                                                  dose, On           



                                                  22           



                                                  at 2330,           



                                                  STAT           

 

     FENTanyl PF      2022- No             100ug      100 mcg,           

[facility]



     (SUBLIMAZE                                Slow IV           ity o

f



     (PF))      03:30: 03:24                          Push,           [place]



     injection      00   :00                           ONCE, 1           Medical



     100 mcg                                         dose, On           Branch



                                                  22           



                                                  at 2230,           



                                                  STAT           

 

     cefTRIAXone            Yes            2000mg      2,000 mg,          

 [facility]



     (ROCEPHIN)      5-17                               Intramuscu           ity

 of



     injection      21:00:                               lar, Q24H,           Te

[facility]



     2,000 mg      00                                 First dose           Medic

al



                                                  on [facility]



                                                  22 at           



                                                  1600,           



                                                  Until           



                                                  Discontinu           



                                                  ed,            



                                                  ASAP<br>Re           



                                                  ason for           



                                                  Anti-Infec           



                                                  tive:           



                                                  Documented           



                                                  Infection<           



                                                  br>Documen           



                                                  huma            



                                                  Infection           



                                                  Site: Skin           



                                                  / Soft           



                                                  Tissue<br>           



                                                  Duration           



                                                  of             



                                                  Therapy:           



                                                  Other (see           



                                                  Comments)           

 

     hydromorpho            Yes            4ug       Take 4 mcg           

[facility]



     ne HCl      5-17                               by mouth           ity of



     (HYDROMORPH      17:43:                               every 12           Te

[facility]



     ONE ORAL)      55                                 (twelve)           Medica

l



                                                  hours.           [place]

 

     hydromorpho            Yes            4ug       Take 4 mcg           

[facility]



     ne HCl      5-17                               by mouth           ity of



     (HYDROMORPH      17:43:                               every 12           Te

[facility]



     ONE ORAL)      55                                 (twelve)           Medica

l



                                                  hours.           [place]

 

     collagenase            Yes       [id number]           Apply to         

  [facility]



     250       5-17                               affected           ity of



     unit/gram      00:00:                               area(s)           [place]



     ointment      00                                 daily.           [facility]



                                                                 Branch

 

     collagenase            Yes       [id number]           Apply to         

  [facility]



     250       5-17                               affected           ity of



     unit/gram      00:00:                               area(s)           [place]



     ointment      00                                 daily.           [facility]



                                                                 Branch

 

     collagenase            Yes       [id number]           Apply to         

  [facility]



     250       5-17                               affected           ity of



     unit/gram      00:00:                               area(s)           [place]



     ointment      00                                 daily.           [facility]



                                                                 Branch

 

     collagenase            Yes       [id number]           Apply to         

  [facility]



     250       5-17                               affected           ity of



     unit/gram      00:00:                               area(s)           [place]



     ointment      00                                 daily.           [facility]

 

     collagenase            Yes       [id number]           Apply to         

  [facility]



     250       5-17                               affected           ity of



     unit/gram      00:00:                               area(s)           [place]



     ointment      00                                 daily.           [facility]

 

     collagenase            Yes       [id number]           Apply to         

  [facility]



     250       5-17                               affected           ity of



     unit/gram      00:00:                               area(s)           [place]



     ointment      00                                 daily.           [facility]

 

     collagenase      0      Yes       [id number]           Apply to         

  [facility]



     250       5-17                               affected           ity of



     unit/gram      00:00:                               area(s)           [place]



     ointment      00                                 daily.           [facility]

 

     collagenase      -      Yes       [id number]           Apply to         

  [facility]



     250       5-17                               affected           ity of



     unit/gram      00:00:                               area(s)           [place]



     ointment      00                                 daily.           Medical



                                                                 Branch

 

     collagenase      -0      Yes       [id number]           Apply to         

  [facility]



     250       5-17                               affected           ity of



     unit/gram      00:00:                               area(s)           [place]



     ointment      00                                 daily.           Medical



                                                                 Branch

 

     collagenase      -0      Yes       [id number]           Apply to         

  [facility]



     250       5-17                               affected           ity of



     unit/gram      00:00:                               area(s)           [place]



     ointment      00                                 daily.           Medical



                                                                 Branch

 

     collagenase      -0      Yes       [id number]           Apply to         

  [facility]



     250       5-17                               affected           ity of



     unit/gram      00:00:                               area(s)           [place]



     ointment      00                                 daily.           Medical



                                                                 Branch

 

     collagenase      -0      Yes       [id number]           Apply to         

  Univers



     250       5-17                               affected           ity of



     unit/gram      00:00:                               area(s)           [place]



     ointment      00                                 daily.           Medical



                                                                 Branch

 

     collagenase      -0      Yes       [id number]           Apply to         

  Univers



     250       5-17                               affected           ity of



     unit/gram      00:00:                               area(s)           [place]



     ointment      00                                 daily.           Medical



                                                                 Branch

 

     collagenase      -0      Yes       [id number]           Apply to         

  [facility]



     250       5-17                               affected           ity of



     unit/gram      00:00:                               area(s)           [place]



     ointment      00                                 daily.           Medical



                                                                 Branch

 

     collagenase      -0      Yes       [id number]           Apply to         

  Univers



     250       5-17                               affected           ity of



     unit/gram      00:00:                               area(s)           [place]



     ointment      00                                 daily.           Medical



                                                                 Branch

 

     collagenase      -0      Yes       [id number]           Apply to         

  [facility]



     250       5-17                               affected           ity of



     unit/gram      00:00:                               area(s)           [place]



     ointment      00                                 daily.           Medical



                                                                 Branch

 

     collagenase      -0      Yes       [id number]           Apply to         

  [facility]



     250       5-17                               affected           ity of



     unit/gram      00:00:                               area(s)           [place]



     ointment      00                                 daily.           Medical



                                                                 Branch

 

     collagenase      -0      Yes       [id number]           Apply to         

  [facility]



     250       5-17                               affected           ity of



     unit/gram      00:00:                               area(s)           [place]



     ointment      00                                 daily.           Medical



                                                                 Branch

 

     collagenase      -0      Yes       [id number]           Apply to         

  Univers



     250       5-17                               affected           ity of



     unit/gram      00:00:                               area(s)           [place]



     ointment      00                                 daily.           Medical



                                                                 Branch

 

     collagenase      -0      Yes       [id number]           Apply to         

  [facility]



     250       5-17                               affected           ity of



     unit/gram      00:00:                               area(s)           [place]



     ointment      00                                 daily.           Medical



                                                                 Branch

 

     collagenase      -0      Yes       [id number]           Apply to         

  [facility]



     250       5-17                               affected           ity of



     unit/gram      00:00:                               area(s)           [place]



     ointment      00                                 daily.           Medical



                                                                 Branch

 

     collagenase            Yes       [id number]           Apply to         

  [facility]



     250       5-17                               affected           ity of



     unit/gram      00:00:                               area(s)           [place]



     ointment      00                                 daily.           Medical



                                                                 Branch

 

     collagenase            Yes       [id number]           Apply to         

  Univers



     250       5-17                               affected           ity of



     unit/gram      00:00:                               area(s)           [place]



     ointment      00                                 daily.           Medical



                                                                 Branch

 

     collagenase            Yes       [id number]           Apply to         

  [facility]



     250       5-17                               affected           ity of



     unit/gram      00:00:                               area(s)           [place]



     ointment      00                                 daily.           Medical



                                                                 Branch

 

     collagenase            Yes       [id number]           Apply to         

  [facility]



     250       5-17                               affected           ity of



     unit/gram      00:00:                               area(s)           [place]



     ointment      00                                 daily.           Medical



                                                                 Branch

 

     collagenase            Yes       [id number]           Apply to         

  [facility]



     250       5-17                               affected           ity of



     unit/gram      00:00:                               area(s)           [place]



     ointment      00                                 daily.           Medical



                                                                 Branch

 

     collagenase            Yes       [id number]           Apply to         

  [facility]



     250       5-17                               affected           ity of



     unit/gram      00:00:                               area(s)           [place]



     ointment      00                                 daily.           Medical



                                                                 Branch

 

     docusate      2022- No        [id number] 100mg      Take 1           U

nivers



     100 mg      5-17 -17                          capsule by           ity of



     capsule      00:00: 04:59                          mouth 2           [place]



               00   :00                           (two)           Medical



                                                  times           Branch



                                                  daily for           



                                                  30 days.           

 

     lactobacill      2022- No        [id number] .5mg      Take 1          

 [facility]



     us        5-17 06-17                          tablet by           ity of



     acidophilus      00:00: 04:59                          mouth 2           [facility]

[place]



               00   :00                           (two)           Medical



                                                  times           [facility]



                                                  daily for           



                                                  30 days.           

 

     sennosides      2022- No        [id number] 8.6mg      Take 1          

 [facility]



     8.6 mg      5-17 -17                          tablet by           ity of



     tablet      00:00: 04:59                          mouth 2           [place]



               00   :00                           (two)           Medical



                                                  times           Br[facility]



                                                  daily for           



                                                  30 days.           

 

     tamsulosin      2022- No        [id number] .4mg      Take 1           

[facility]



     0.4 mg 24      5-17 -17                          capsule by           ity

 of



     hr capsule      00:00: 04:59                          mouth           [place]



               00   :00                           daily for           Medical



                                                  30 days.           Branch

 

     docusate      2022- No        [id number] 100mg      Take 1           U

nivers



     100 mg      5-17 06-17                          capsule by           ity of



     capsule      00:00: 04:59                          mouth 2           [place]



               00   :00                           (two)           Medical



                                                  times           Br[facility]



                                                  daily for           



                                                  30 days.           

 

     lactobacill      -2022- No        [id number] .5mg      Take 1          

 [facility]



     us        5-17 06-17                          tablet by           ity of



     acidophilus      00:00: 04:59                          mouth 2           [facility]

[facility]



               00   :00                           (two)           Medical



                                                  times           [facility]



                                                  daily for           



                                                  30 days.           

 

     sennosides      2022- No        [id number] 8.6mg      Take 1          

 [facility]



     8.6 mg      5-17 06-17                          tablet by           ity of



     tablet      00:00: 04:59                          mouth 2           [place]



               00   :00                           (two)           Medical



                                                  times           [facility]



                                                  daily for           



                                                  30 days.           

 

     tamsulosin      2022- No        [id number] .4mg      Take 1           

[facility]



     0.4 mg 24      5-17 06-17                          capsule by           ity

 of



     hr capsule      00:00: 04:59                          mouth           [place]



               00   :00                           daily for           Medical



                                                  30 days.           Branch

 

     docusate      2022- No        [id number] 100mg      Take 1           U

nivers



     100 mg      5-17 06-17                          capsule by           ity of



     capsule      00:00: 04:59                          mouth 2           [place]



               00   :00                           (two)           Medical



                                                  times           Br[facility]



                                                  daily for           



                                                  30 days.           

 

     lactobacill      2022- No        [id number] .5mg      Take 1          

 [facility]



     us        5-17 06-17                          tablet by           ity of



     acidophilus      00:00: 04:59                          mouth 2           [facility]

[place]



               00   :00                           (two)           Medical



                                                  times           [facility]



                                                  daily for           



                                                  30 days.           

 

     sennosides      2022- No        [id number] 8.6mg      Take 1          

 [facility]



     8.6 mg      5-17 06-17                          tablet by           ity of



     tablet      00:00: 04:59                          mouth 2           [place]



               00   :00                           (two)           Medical



                                                  times           Br[facility]



                                                  daily for           



                                                  30 days.           

 

     tamsulosin      2022- No        [id number] .4mg      Take 1           

[facility]



     0.4 mg 24      5-17 06-17                          capsule by           ity

 of



     hr capsule      00:00: 04:59                          mouth           [place]



               00   :00                           daily for           Medical



                                                  30 days.           Branch

 

     docusate      2022- No        [id number] 100mg      Take 1           U

nivers



     100 mg      5-17 06-17                          capsule by           ity of



     capsule      00:00: 04:59                          mouth 2           [place]



               00   :00                           (two)           Medical



                                                  times           [place]



                                                  daily for           



                                                  30 days.           

 

     lactobacill      2022- No        [id number] .5mg      Take 1          

 [facility]



     us        5-17 06-17                          tablet by           ity of



     acidophilus      00:00: 04:59                          mouth 2           [facility]

[facility]



               00   :00                           (two)           Medical



                                                  times           [facility]



                                                  daily for           



                                                  30 days.           

 

     sennosides      2022- No        [id number] 8.6mg      Take 1          

 [facility]



     8.6 mg      5-17 06-17                          tablet by           ity of



     tablet      00:00: 04:59                          mouth 2           [place]



               00   :00                           (two)           Medical



                                                  times           [place]



                                                  daily for           



                                                  30 days.           

 

     tamsulosin      2022- No        [id number] .4mg      Take 1           

[facility]



     0.4 mg 24      5-17 06-17                          capsule by           ity

 of



     hr capsule      00:00: 04:59                          mouth           [place]



               00   :00                           daily for           Medical



                                                  30 days.           [facility]

 

     docusate      2022- No        [id number] 100mg      Take 1           U

n[facility]



     100 mg      5-17 06-17                          capsule by           ity of



     capsule      00:00: 04:59                          mouth 2           [place]



               00   :00                           (two)           Medical



                                                  times           [place]



                                                  daily for           



                                                  30 days.           

 

     lactobacill      2022- No        [id number] .5mg      Take 1          

 [facility]



     us        5-17 06-17                          tablet by           ity of



     acidophilus      00:00: 04:59                          mouth 2           [place]



               00   :00                           (two)           [facility]



                                                  times           [place]



                                                  daily for           



                                                  30 days.           

 

     sennosides      2022- No        [id number] 8.6mg      Take 1          

 [facility]



     8.6 mg      5-17 06-17                          tablet by           ity of



     tablet      00:00: 04:59                          mouth 2           [place]



               00   :00                           (two)           Medical



                                                  times           [place]



                                                  daily for           



                                                  30 days.           

 

     tamsulosin      2022- No        [id number] .4mg      Take 1           

[facility]



     0.4 mg 24      5-17 06-17                          capsule by           ity

 of



     hr capsule      00:00: 04:59                          mouth           [place]



               00   :00                           daily for           Medical



                                                  30 days.           [facility]

 

     metroNIDAZO      2022- No        [id number] 500mg      Take 1         

  [facility]



      mg      5-17 05-21                          tablet by           ity 

of



     tablet      00:00: 04:59                          mouth           [place]



               00   :00                           every 12           Medical



                                                  (twelve)           [facility]



                                                  hours for           



                                                  3 days.           

 

     metroNIDAZO      2022- No        [id number] 500mg      Take 1         

  [facility]



      mg      -                          tablet by           ity 

of



     tablet      00:00: 04:59                          mouth           [place]



               00   :00                           every 12           Medical



                                                  (twelve)           [facility]



                                                  hours for           



                                                  3 days.           

 

     metroNIDAZO      2022- No             500mg      500 mg,           U

nivers



     LE (FLAGYL)      5-15 05-20                          Oral, Q12H           i

ty of



     tablet 500      22:00: 21:59                          ABX, 10           [facility]

as



     mg        00   :00                           doses,           Medical



                                                  First dose           [facility]



                                                  on Sun           



                                                  5/15/22 at           



                                                  1700, Last           



                                                  dose on           



                                                  22 at           



                                                  0500,           



                                                  Routine<br           



                                                  >Reason           



                                                  for            



                                                  Anti-Infec           



                                                  tive:           



                                                  Documented           



                                                  Infection<           



                                                  br>Documen           



                                                  huma            



                                                  Infection           



                                                  Site: Skin           



                                                  / Soft           



                                                  Tissue<br>           



                                                  Duration           



                                                  of             



                                                  Therapy:           



                                                  Other (see           



                                                  Comments)           

 

     cefTRIAXone      2022- No             2g        2 g, IV           Un

[facility]



     (ROCEPHIN)      5-15 05-17                          Piggyback,           it

y of



     2 g in NaCl      20:00: 20:03                          Q24H ABX,           

[place]



     0.9% (NS)      00   :21                           3 doses,           Medica

l



     100 mL                                         First dose           [facility]



     MINI-BAG                                         on Sun           



                                                  5/15/22 at           



                                                  1500, Last           



                                                  dose on           



                                                  22 at           



                                                  1500,           



                                                  Administer           



                                                  over 30           



                                                  Minutes,           



                                                  100            



                                                  mL<br>Reas           



                                                  on for           



                                                  Anti-Infec           



                                                  tive:           



                                                  Documented           



                                                  Infection<           



                                                  br>Documen           



                                                  huma            



                                                  Infection           



                                                  Site: Skin           



                                                  / Soft           



                                                  Tissue<br>           



                                                  Duration           



                                                  of             



                                                  Therapy:           



                                                  Other (see           



                                                  Comments)           

 

     enoxaparin            Yes            30mg      30 mg,           Unive

rs



     (LOVENOX)      512                               Subcutaneo           ity 

of



     injection      01:00:                               us, Q12H,           [facility]

as



     30 mg      00                                 First dose           Medical



                                                  on Wed           [facility]



                                                  22 at           



                                                  2000,           



                                                  Until           



                                                  Discontinu           



                                                  ed,            



                                                  Routine           

 

     proMETHazin            Yes            25mg      25 mg, IV           U

nivers



     e                                        Piggyback,           ity of



     (PHENERGAN)      22:58:                               Q4HPRN,           [facility]

as



     25 mg in      13                                 Starting           Medical



     NaCl 0.9%                                         on Wed           [facility]



     (NS) 50 mL                                         22 at           



     IV                                           1758,           



     piggyback                                         Until           



                                                  Discontinu           



                                                  ed,            



                                                  Routine,           



                                                  Nausea and           



                                                  Vomiting           



                                                  (N/V)           

 

     collagenase            Yes                      Topical           Uni

vers



     (SANTYL)                                     (Apply To           ity of



     ointment      22:15:                               Affected           [place]



               00                                 Areas),           Medical



                                                  DAILY,           [facility]



                                                  First dose           



                                                  on 22 at           



                                                  1715,           



                                                  Until           



                                                  Discontinu           



                                                  ed,            



                                                  Routine           

 

     HYDROmorpho      2022- No             1mg       1 mg, Slow          

 [facility]



     ne                                  IV Push,           ity of



     (DILAUDID)      21:45: 21:25                          ONCE, 1           [facility]

as



     injection 1      00   :00                           dose, On           Medi

[facility]



     mg                                           Wed            [facility]



                                                  22 at           



                                                  1645,           



                                                  Routine<br           



                                                  >Use           



                                                  approved           



                                                  by             



                                                  (Faculty):           



                                                  ADC            



                                                  PROVIDER           

 

     magnesium      2022- No             2g        2 g, IV           Un[facility]

ers



     sulfate in                                Piggyback,           it

y of



     water 2      15:15: 16:11                          Administer           [facility]

as



     gram/50 mL      00   :00                           over 60           [facility]

l



     (4 %)                                         Minutes,           [facility]



     infusion 2                                         ONCE, 1           



     g                                            dose, On           



                                                  22 at           



                                                  1015,           



                                                  Routine           

 

     lactulose            Yes            15mL      15 mL,           [facility]

s



     (CEPHULAC)                                     Oral,           ity of



     solution 15      14:00:                               DAILY,           [place]

[place]



     mL        00                                 First dose           Medical



                                                  on Wed           [facility]



                                                  22 at           



                                                  0900,           



                                                  Until           



                                                  Discontinu           



                                                  ed,            



                                                  Routine           

 

     vancomycin      2022- No             125mg      125 mg,           Un

[facility]



     (FIRVANQ)                                Oral,           ity of



     50 mg/mL      14:00: 16:27                          Q24H,           [place]



     oral      00   :02                           First dose           Medical



     solution                                         on Wed           [facility]



     125 mg                                         22 at           



                                                  0900,           



                                                  Until           



                                                  Discontinu           



                                                  ed,            



                                                  Routine<br           



                                                  >Reason           



                                                  for            



                                                  Anti-Infec           



                                                  tive:           



                                                  Empiric           



                                                  Non-Surgic           



                                                  al             



                                                  Prophylaxi           



                                                  s<br>Durat           



                                                  ion of           



                                                  therapy: 7           



                                                  days           

 

     sennosides            Yes            8.6mg      8.6 mg,           Uni

vers



     (SENOKOT)                                     Oral, BID,           ity 

of



     tablet 8.6      13:00:                               First dose           [facility]

[place]



     mg        00                                 on Wed           Medical



                                                  22 at           [facility]



                                                  0800,           



                                                  Until           



                                                  Discontinu           



                                                  ed,            



                                                  Routine           

 

     docusate            Yes            100mg      100 mg,           Unive

rs



     (COLACE)                                     Oral, BID,           ity o

f



     capsule 100      13:00:                               First dose           

[place]



     mg        00                                 on Wed           Medical



                                                  22 at           [facility]



                                                  0800,           



                                                  Until           



                                                  Discontinu           



                                                  ed,            



                                                  Routine           

 

     HYDROmorpho            Yes            2mg       2 mg,           Unive

rs



     ne                                       Oral, TID,           ity of



     (DILAUDID)      13:00:                               First dose           T

[facility]



     tablet 2 mg      00                                 on Wed           [facility]

l



                                                  22 at           [facility]



                                                  0800,           



                                                  Until           



                                                  Discontinu           



                                                  ed             

 

     lactobacill            Yes            .5mg      0.5 mg,           Uni

vers



     us                                       Oral, BID,           ity of



     acidophilus      13:00:                               First dose           

[place]



     tablet 0.5      00                                 on Wed           Medical



     mg                                           22 at           [facility]



                                                  0800,           



                                                  Until           



                                                  Discontinu           



                                                  ed,            



                                                  Routine           

 

     ceFEPIme      2022- No             2g        2 g, IV           Unive

rs



     (MAXIPIME)      15                          Piggyback,           it

y of



     2 g in NaCl      11:00: 19:23                          Q8H ABX,           [facility]

[facility]



     0.9% (NS)      00   :40                           First dose           Medi

[facility]



     100 mL                                         on 



     MINI-BAG                                         22 at           



                                                  0600,           



                                                  Until           



                                                  Discontinu           



                                                  ed,            



                                                  Administer           



                                                  over 30           



                                                  Minutes,           



                                                  100            



                                                  mL<br>Reas           



                                                  on for           



                                                  Anti-Infec           



                                                  tive:           



                                                  Empiric           



                                                  Therapy           



                                                  for            



                                                  Suspected           



                                                  Infection<           



                                                  br&gt;Empi           



                                                  alda            



                                                  Therapy           



                                                  Site:           



                                                  Bone<br>Du           



                                                  ration of           



                                                  therapy:           



                                                  72 hours           

 

     NaCl 0.9%      2022- No             1000mL      at 50           Univ

ers



     (NS) IV       05-16                          mL/hr, IV           ity of



     infusion      10:00: 16:41                          Infusion,           [facility]

as



     1,000 mL      00   :28                           CONTINUOUS           Medic

al



                                                  , Starting           [facility]



                                                  on 22 at           



                                                  0500,           



                                                  Until 22 at           



                                                  1141,           



                                                  Routine           

 

     doxycycline       No             100mg      100 mg, IV         

  [facility]



     (VIBRAMYCIN      14                          Piggyback,           i

ty of



     ) 100 mg in      09:15: 23:11                          Q12H ABX,           

[place]



     NaCl 0.9%      00   :48                           First dose           Medi

[facility]



     (NS) 100 mL                                         on 



     MINI-BAG                                         22 at           



                                                  0415,           



                                                  Until           



                                                  Discontinu           



                                                  ed,            



                                                  Administer           



                                                  over 60           



                                                  Minutes,           



                                                  100            



                                                  mL<br>Reas           



                                                  on for           



                                                  Anti-Infec           



                                                  tive:           



                                                  Empiric           



                                                  Therapy           



                                                  for            



                                                  Suspected           



                                                  Infection<           



                                                  br>Empiric           



                                                  Therapy           



                                                  Site:           



                                                  Wound<br>D           



                                                  uration of           



                                                  therapy: 7           



                                                  days           

 

     tamsulosin            Yes            .4mg      0.4 mg,           Univ

ers



     (FLOMAX)                                     Oral,           ity of



     capsule 0.4      08:30:                               DAILY,           [place]

[place]



     mg        00                                 First dose           Medical



                                                  on 22 at           



                                                  0330,           



                                                  Until           



                                                  Discontinu           



                                                  ed,            



                                                  Routine           

 

     HYDROmorpho            Yes            1mg       1 mg, Slow           

[facility]



     ne                                       IV Push,           ity of



     (DILAUDID)      06:04:                               Q4HPRN,           [place]

s



     injection 1      07                                 Starting           Medi

sarah



     mg                                           on 22 at           



                                                  0104,           



                                                  Until           



                                                  Discontinu           



                                                  ed,            



                                                  Routine,           



                                                  Pain           



                                                  (scale           



                                                  7-10)<br>U           



                                                  se             



                                                  approved           



                                                  by             



                                                  (Faculty):           



                                                  ADC            



                                                  PROVIDER           

 

     FENTanyl PF       No             50ug      50 mcg,           Un

yoselyn



     (SUBLIMAZE                                Slow IV           ity o

f



     (PF))      05:30: 05:01                          Push,           [place]



     injection      00   :00                           ONCE, 1           Medical



     50 mcg                                         dose, On           Branch



                                                  22 at           



                                                  0030, STAT           

 

     iopamidol      2022- No        [id number] 150mL      150 mL,         

  [facility]



     (ISOVUE                                Intravenou           ity o

f



     370-500 mL)      04:45: 03:34                          s, ONCE, 1          

 [place]



     injection      00   :00                           dose, On           [facility]

l



     150 mL                                         Tue            Branch



                                                  5/10/22 at           



                                                  2345,           



                                                  Routine           

 

     ceFEPIme       No             2000mg      2,000 mg,           U

[facility]ivers



     (MAXIPIME)                                IV             ity of



     injection      04:15: 04:15                          Piggyback,           T

[place]



     2,000 mg      00   :00                           ONCE, 1           Medical



                                                  dose, On           Branch



                                                  Tue            



                                                  5/10/22 at           



                                                  2315,           



                                                  STAT<br>Re           



                                                  ason for           



                                                  Anti-Infec           



                                                  tive:           



                                                  Empiric           



                                                  Therapy           



                                                  for            



                                                  Suspected           



                                                  Infection<           



                                                  br>Empiric           



                                                  Therapy           



                                                  Site:           



                                                  Bone<br>Du           



                                                  ration of           



                                                  therapy:           



                                                  72 hours           

 

     FENTanyl PF      2022- No             75ug      75 mcg,           Un

yoselyn



     (SUBLIMAZE                                Slow IV           ity o

f



     (PF))      03:45: 02:47                          Push,           [place]



     injection      00   :00                           ONCE, 1           Medical



     75 mcg                                         dose, On           Branch



                                                  Tue            



                                                  5/10/22 at           



                                                  2245, STAT           

 

     OXYCODONE      2022- No                       Take by           [facility]

ers



     HCL/ACETAMI                                mouth.           ity o

f



     NOPHEN      03:22: 00:00                                         [place]



     (PERCOCET      50   :00                                          Medical



     ORAL)                                                        [place]

 

     FENTanyl PF      2022- No             100ug      100 mcg,           

[facility]



     (SUBLIMAZE                                Slow IV           ity o

f



     (PF))      01:30: 01:12                          Push,           [place]



     injection      00   :00                           ONCE, 1           Medical



     100 mcg                                         dose, On           Branch



                                                  Tue            



                                                  5/10/22 at           



                                                  2030, STAT           

 

     Lovenox            No                       Notes:           [facility]



               4-13                               (Same as:           l



               17:00:                               Lovenox)                                                           

 

     Lovenox            No                       Notes:           [facility]



               4-13                               (Same as:           l



               17:00:                               Lovenox)           [facility]                                                

 

     Lovenox            No                       Notes:           [facility]



               4-13                               (Same as:           l



               17:00:                               Lovenox)                                                           

 

     promethazin            No                       Notes:           [facility]

rajeev



     e         4-13                               (Same as:           l



               16:47:                               Phenergan)                                                           

 

     promethazin            No                       Notes:           [facility]

rajeev



     e         4-13                               (Same as:           l



               16:47:                               Phenergan)                                                           

 

     promethazin            No                       Notes:           [facility]

rajeev



     e         4-13                               (Same as:           l



               16:47:                               Phenergan)                                                           

 

     albuterol            No                       Notes: SEE           Me

moria



     0.083%      4-13                               RT             l



     inhalation      16:46:                               DOCUMENTAT           H

ermann



     solution      00                                 ION (Same           



                                                  as:            



                                                  Proventil)           

 

     albuterol            No                       Notes: SEE           Me

moria



     0.083%      4-13                               RT             l



     inhalation      16:46:                               DOCUMENTAT           H

ermann



     solution      00                                 ION (Same           



                                                  as:            



                                                  Proventil)           

 

     albuterol            No                       Notes: SEE           Me

moria



     0.083%      4-13                               RT             l



     inhalation      16:46:                               DOCUMENTAT           H

ermann



     solution      00                                 ION (Same           



                                                  as:            



                                                  Proventil)           

 

     hydromorpho      2-0      Yes                      4 mg = 1           Me

moria



     ne 4 mg      4-13                               tab, PO,           l



     oral tablet      16:43:                               Q12H, 0           Her

child



               00                                 Refill(s)           

 

     hydromorpho      2-0      Yes                      4 mg = 1           Me

moria



     ne 4 mg      4-13                               tab, PO,           l



     oral tablet      16:43:                               Q12H, 0           Her

child



               00                                 Refill(s)           

 

     hydromorpho      2-0      Yes                      4 mg = 1           Me

moria



     ne 4 mg      4-13                               tab, PO,           l



     oral tablet      16:43:                               Q12H, 0           Her

child



               00                                 Refill(s)           

 

     Lantus 100      2022-0      No                       10 unit,           Mem

[facility]



     units/mL      4-13                               SUB-Q,           l



               16:40:                               Daily, 0           [facility]



               00                                 Refill(s)           

 

     Lantus 100      2-0      No                       10 unit,           Mem

oria



     units/mL      4-13                               SUB-Q,           l



               16:40:                               Daily, 0           [place]



               00                                 Refill(s)           

 

     Lantus 100      2-0      No                       10 unit,           Mem

oria



     units/mL      4-13                               SUB-Q,           l



               16:40:                               Daily, 0           [facility]



               00                                 Refill(s)           

 

     Lantus 100      2-0      No                       10 unit,           Mem

oria



     units/mL      4-12                               0.1 mL,           l



               14:00:                               Route:           [place]



               00                                 SUB-Q,           



                                                  Drug form:           



                                                  SOLN,           



                                                  Daily,           



                                                  Dosing           



                                                  Weight           



                                                  127.727,           



                                                  kg, Start           



                                                  date:           



                                                  22           



                                                  9:00:00           



                                                  CDT,           



                                                  Duration:           



                                                  30 day,           



                                                  Stop date:           



                                                  22           



                                                  9:00:00           



                                                  CDT,           



                                                  Infuse           



                                                  over: 0           



                                                  hr, 0           

 

     Lantus 100      2022-0      No                       10 unit,           Mem

[facility]



     units/mL      4-12                               0.1 mL,           l



               14:00:                               Route:           [place]



               00                                 SUB-Q,           



                                                  Drug form:           



                                                  SOLN,           



                                                  Daily,           



                                                  Dosing           



                                                  Weight           



                                                  127.727,           



                                                  kg, Start           



                                                  date:           



                                                  22           



                                                  9:00:00           



                                                  CDT,           



                                                  Duration:           



                                                  30 day,           



                                                  Stop date:           



                                                  22           



                                                  9:00:00           



                                                  CDT,           



                                                  Infuse           



                                                  over: 0           



                                                  hr, 0           

 

     Lantus 100      2022-0      No                       10 unit,           Mem

[facility]



     units/mL      4-12                               0.1 mL,           l



               14:00:                               Route:           [place]



               00                                 SUB-Q,           



                                                  Drug form:           



                                                  SOLN,           



                                                  Daily,           



                                                  Dosing           



                                                  Weight           



                                                  127.727,           



                                                  kg, Start           



                                                  date:           



                                                  22           



                                                  9:00:00           



                                                  CDT,           



                                                  Duration:           



                                                  30 day,           



                                                  Stop date:           



                                                  22           



                                                  9:00:00           



                                                  CDT,           



                                                  Infuse           



                                                  over: 0           



                                                  hr, 0           

 

     cefepime +            No                       Notes:           [facility]

ia



     sterile      4-12                               (Same As:           l



     water 10 mL      06:00:                               Maxipime)           H

                                 ***            



                                                  MEDICATION           



                                                  WASTE ***           



                                                  Product           



                                                  Size: 1000           



                                                  mg Product           



                                                  Wasted:           



                                                  _0__ mg           

 

     cefepime +            No                       Notes:           [facility]

ia



     sterile      4-12                               (Same As:           l



     water 10 mL      06:00:                               Maxipime)           H

                                 ***            



                                                  MEDICATION           



                                                  WASTE ***           



                                                  Product           



                                                  Size: 1000           



                                                  mg Product           



                                                  Wasted:           



                                                  _0__ mg           

 

     cefepime +            No                       Notes:           [facility]

ia



     sterile      4-12                               (Same As:           l



     water 10 mL      06:00:                               Maxipime)           H

                                 ***            



                                                  MEDICATION           



                                                  WASTE ***           



                                                  Product           



                                                  Size: 1000           



                                                  mg Product           



                                                  Wasted:           



                                                  _0__ mg           

 

     cefepime +      -      No                       Notes:           [facility]

ia



     sterile      4-12                               (Same As:           l



     water 10 mL      05:00:                               Maxipime)           H

                                 ***            



                                                  MEDICATION           



                                                  WASTE ***           



                                                  Product           



                                                  Size: 1000           



                                                  mg Product           



                                                  Wasted:           



                                                  _0__ mg           

 

     insulin      -      No                       Notes:           [facility]



     lispro      4-12                               (Same as:           l



               05:00:                               Humalog)                                            Roll in           



                                                  palms of           



                                                  hands           



                                                  gently; Do           



                                                  not shake           



                                                  vigorously           



                                                  . WASTE:           



                                                  F/P -           



                                                  Black; E -           



                                                  Municipal           



                                                  Trash Bin           



                                                  Stable for           



                                                  28 days at           



                                                  room           



                                                  temperatur           



                                                  e. Expires           



                                                  in _____           



                                                  days from           



                                                  __________           



                                                  ____Date           

 

     cefepime +            No                       Notes:           [facility]

ia



     sterile      4-12                               (Same As:           l



     water 10 mL      05:00:                               Maxipime)           H

                                 ***            



                                                  MEDICATION           



                                                  WASTE ***           



                                                  Product           



                                                  Size: 1000           



                                                  mg Product           



                                                  Wasted:           



                                                  _0__ mg           

 

     insulin      -      No                       Notes:           [facility]



     lispro      4-12                               (Same as:           l



               05:00:                               Humalog)           [place]                                 Roll in           



                                                  palms of           



                                                  hands           



                                                  gently; Do           



                                                  not shake           



                                                  vigorously           



                                                  . WASTE:           



                                                  F/P -           



                                                  Black; E -           



                                                  Municipal           



                                                  Trash Bin           



                                                  Stable for           



                                                  28 days at           



                                                  room           



                                                  temperatur           



                                                  e. Expires           



                                                  in _____           



                                                  days from           



                                                  __________           



                                                  ____Date           

 

     cefepime +            No                       Notes:           [facility]

ia



     sterile      4-12                               (Same As:           l



     water 10 mL      05:00:                               Maxipime)           H

                                 ***            



                                                  MEDICATION           



                                                  WASTE ***           



                                                  Product           



                                                  Size: 1000           



                                                  mg Product           



                                                  Wasted:           



                                                  _0__ mg           

 

     insulin            No                       Notes:           [facility]



     lispro      4-12                               (Same as:           l



               05:00:                               Humalog)                                            Roll in           



                                                  palms of           



                                                  hands           



                                                  gently; Do           



                                                  not shake           



                                                  vigorously           



                                                  . WASTE:           



                                                  F/P -           



                                                  Black; E -           



                                                  Municipal           



                                                  Trash Bin           



                                                  Stable for           



                                                  28 days at           



                                                  room           



                                                  temperatur           



                                                  e. Expires           



                                                  in _____           



                                                  days from           



                                                  __________           



                                                  ____Date           

 

     Dilaudid            No                       Notes:           [facility]



               4-11                               (Same as:           l



               16:53:                               Dilaudid)                                                           

 

     Dilaudid            No                       Notes:           [facility]



               4-11                               (Same as:           l



               16:53:                               Dilaudid)                                                           

 

     Dilaudid            No                       Notes:           [facility]



               4-11                               (Same as:           l



               16:53:                               Dilaudid)                                                           

 

     Lantus 100            No                       10 unit,           [facility]

[facility]



     units/mL      4-11                               0.1 mL,           l



               16:24:                               Route:                                            SUB-Q,           



                                                  Drug form:           



                                                  SOLN,           



                                                  ONCE,           



                                                  Dosing           



                                                  Weight           



                                                  127.727,           



                                                  kg, Start           



                                                  date:           



                                                  22           



                                                  11:24:00           



                                                  CDT, Stop           



                                                  date:           



                                                  22           



                                                  11:24:00           



                                                  CDT,           



                                                  Infuse           



                                                  over: 0           



                                                  hr, 0           

 

     Lantus 100            No                       10 unit,           [facility]

[facility]



     units/mL      4-11                               0.1 mL,           l



               16:24:                               Route:                                            SUB-Q,           



                                                  Drug form:           



                                                  SOLN,           



                                                  ONCE,           



                                                  Dosing           



                                                  Weight           



                                                  127.727,           



                                                  kg, Start           



                                                  date:           



                                                  22           



                                                  11:24:00           



                                                  CDT, Stop           



                                                  date:           



                                                  22           



                                                  11:24:00           



                                                  CDT,           



                                                  Infuse           



                                                  over: 0           



                                                  hr, 0           

 

     Lantus 100      -0      No                       10 unit,           [facility]

[facility]



     units/mL      4-11                               0.1 mL,           l



               16:24:                               Route:           [facility]



               00                                 SUB-Q,           



                                                  Drug form:           



                                                  SOLN,           



                                                  ONCE,           



                                                  Dosing           



                                                  Weight           



                                                  127.727,           



                                                  kg, Start           



                                                  date:           



                                                  22           



                                                  11:24:00           



                                                  CDT, Stop           



                                                  date:           



                                                  22           



                                                  11:24:00           



                                                  CDT,           



                                                  Infuse           



                                                  over: 0           



                                                  hr, 0           

 

     NS (Bolus)      -0      No                       500 mL,           [facility]

[facility]



     IV        4-11                               500 ml/hr,           l



               15:15:                               Infuse           [place]



               00                                 Over: 1           



                                                  hr, Route:           



                                                  IV, 500,           



                                                  Drug form:           



                                                  INJ, ONCE,           



                                                  Priority:           



                                                  STAT,           



                                                  Dosing           



                                                  Weight           



                                                  127.727           



                                                  kg, Start           



                                                  date:           



                                                  22           



                                                  10:15:00           



                                                  CDT, Stop           



                                                  date:           



                                                  22           



                                                  10:15:00           



                                                  CDT, 0           

 

     NS (Bolus)      -0      No                       500 mL,           [facility]

[facility]



     IV        4-11                               500 ml/hr,           l



               15:15:                               Infuse           [facility]



               00                                 Over: 1           



                                                  hr, Route:           



                                                  IV, 500,           



                                                  Drug form:           



                                                  INJ, ONCE,           



                                                  Priority:           



                                                  STAT,           



                                                  Dosing           



                                                  Weight           



                                                  127.727           



                                                  kg, Start           



                                                  date:           



                                                  22           



                                                  10:15:00           



                                                  CDT, Stop           



                                                  date:           



                                                  22           



                                                  10:15:00           



                                                  CDT, 0           

 

     NS (Bolus)      -0      No                       500 mL,           [facility]

[facility]



     IV        4-11                               500 ml/hr,           l



               15:15:                               Infuse           [place]



               00                                 Over: 1           



                                                  hr, Route:           



                                                  IV, 500,           



                                                  Drug form:           



                                                  INJ, ONCE,           



                                                  Priority:           



                                                  STAT,           



                                                  Dosing           



                                                  Weight           



                                                  127.727           



                                                  kg, Start           



                                                  date:           



                                                  22           



                                                  10:15:00           



                                                  CDT, Stop           



                                                  date:           



                                                  22           



                                                  10:15:00           



                                                  CDT, 0           

 

     Dextrose      -0      No                       12.5 gm,           [facility]

[facility]



     50% Syringe      4-11                               25 mL,           l



     (D50W)      13:59:                               Route:           [facility]



               00                                 IVP, Drug           



                                                  Form: INJ,           



                                                  Dosing           



                                                  Weight           



                                                  127.727,           



                                                  kg, PRN,           



                                                  PRN Blood           



                                                  Glucose           



                                                  Results,           



                                                  Start           



                                                  date:           



                                                  22           



                                                  8:59:00           



                                                  CDT,           



                                                  Duration:           



                                                  30 day,           



                                                  Stop date:           



                                                  22           



                                                  8:58:00           



                                                  CDT, 0           

 

     glucagon      -0      No                       1 mg,           [facility]



               4-11                               Route: IM,           l



               13:59:                               Drug form:           [place]



               00                                 PDR/INJ,           



                                                  PRN,           



                                                  Dosing           



                                                  Weight           



                                                  127.727,           



                                                  kg, PRN           



                                                  Blood           



                                                  Glucose           



                                                  Results,           



                                                  Start           



                                                  date:           



                                                  22           



                                                  8:59:00           



                                                  CDT,           



                                                  Duration:           



                                                  30 day,           



                                                  Stop date:           



                                                  22           



                                                  8:58:00           



                                                  CDT, 0           

 

     insulin      2022-0      No                       Notes:           [facility]



     lispro      4-11                               (Same as:           l



               13:59:                               Humalog)           [place]



               00                                 Roll in           



                                                  palms of           



                                                  hands           



                                                  gently; Do           



                                                  not shake           



                                                  vigorously           



                                                  . WASTE:           



                                                  F/P -           



                                                  Black; E -           



                                                  Municipal           



                                                  Trash Bin           



                                                  Stable for           



                                                  28 days at           



                                                  room           



                                                  temperatur           



                                                  e. Expires           



                                                  in _____           



                                                  days from           



                                                  __________           



                                                  ____Date           

 

     Dextrose      -0      No                       12.5 gm,           [facility]

[facility]



     50% Syringe      4-11                               25 mL,           l



     (D50W)      13:59:                               Route:           [facility]



               00                                 IVP, Drug           



                                                  Form: INJ,           



                                                  Dosing           



                                                  Weight           



                                                  127.727,           



                                                  kg, PRN,           



                                                  PRN Blood           



                                                  Glucose           



                                                  Results,           



                                                  Start           



                                                  date:           



                                                  22           



                                                  8:59:00           



                                                  CDT,           



                                                  Duration:           



                                                  30 day,           



                                                  Stop date:           



                                                  22           



                                                  8:58:00           



                                                  CDT, 0           

 

     glucagon      2022-0      No                       1 mg,           [facility]



               4-11                               Route: IM,           l



               13:59:                               Drug form:           [place]



               00                                 PDR/INJ,           



                                                  PRN,           



                                                  Dosing           



                                                  Weight           



                                                  127.727,           



                                                  kg, PRN           



                                                  Blood           



                                                  Glucose           



                                                  Results,           



                                                  Start           



                                                  date:           



                                                  22           



                                                  8:59:00           



                                                  CDT,           



                                                  Duration:           



                                                  30 day,           



                                                  Stop date:           



                                                  22           



                                                  8:58:00           



                                                  CDT, 0           

 

     insulin      202-0      No                       Notes:           [facility]



     lispro      4-11                               (Same as:           l



               13:59:                               Humalog)           [place]



               00                                 Roll in           



                                                  palms of           



                                                  hands           



                                                  gently; Do           



                                                  not shake           



                                                  vigorously           



                                                  . WASTE:           



                                                  F/P -           



                                                  Black; E -           



                                                  Municipal           



                                                  Trash Bin           



                                                  Stable for           



                                                  28 days at           



                                                  room           



                                                  temperatur           



                                                  e. Expires           



                                                  in _____           



                                                  days from           



                                                  __________           



                                                  ____Date           

 

     Dextrose      -0      No                       12.5 gm,           [facility]

[facility]



     50% Syringe      4-11                               25 mL,           l



     (D50W)      13:59:                               Route:           [facility]



               00                                 IVP, Drug           



                                                  Form: INJ,           



                                                  Dosing           



                                                  Weight           



                                                  127.727,           



                                                  kg, PRN,           



                                                  PRN Blood           



                                                  Glucose           



                                                  Results,           



                                                  Start           



                                                  date:           



                                                  22           



                                                  8:59:00           



                                                  CDT,           



                                                  Duration:           



                                                  30 day,           



                                                  Stop date:           



                                                  22           



                                                  8:58:00           



                                                  CDT, 0           

 

     glucagon            No                       1 mg,           [facility]



               4-11                               Route: IM,           l



               13:59:                               Drug form:           [place]                                 PDR/INJ,           



                                                  PRN,           



                                                  Dosing           



                                                  Weight           



                                                  127.727,           



                                                  kg, PRN           



                                                  Blood           



                                                  Glucose           



                                                  Results,           



                                                  Start           



                                                  date:           



                                                  22           



                                                  8:59:00           



                                                  CDT,           



                                                  Duration:           



                                                  30 day,           



                                                  Stop date:           



                                                  22           



                                                  8:58:00           



                                                  CDT, 0           

 

     insulin            No                       Notes:           [facility]



     lispro      4-11                               (Same as:           l



               13:59:                               Humalog)                                            Roll in           



                                                  palms of           



                                                  hands           



                                                  gently; Do           



                                                  not shake           



                                                  vigorously           



                                                  .  WASTE:           



                                                  F/P -           



                                                  Black; E -           



                                                  Municipal           



                                                  Trash Bin           



                                                  Stable for           



                                                  28 days at           



                                                  room           



                                                  temperatur           



                                                  e. Expires           



                                                  in _____           



                                                  days from           



                                                  __________           



                                                  ____Date           

 

     Lyrica            No                       Notes:           [facility]



               4-11                               (Same as:           l



               02:00:                               Lyrica)                                                           

 

     Lyrica            No                       Notes:           [facility]



               4-11                               (Same as:           l



               02:00:                               Lyrica)                                                           

 

     Lyrica            No                       Notes:           [facility]



               4-11                               (Same as:           l



               02:00:                               Lyrica)                                                           

 

     valproic            No                       Notes:           [facility]



     acid 100      4-10                               Dilute in           l



     mg/mL      17:00:                               at least           [place]



     intravenous      00                                 50ml D5W           



     solution +                                         or NS.           



     Sodium                                         Infusion           



     Chloride                                         rate = 20           



     0.9% IV 50                                         mg/min           



     mL                                           (Same As:           



                                                  [facility])           



                                                  Hazardous           



                                                  Drug Group           



                                                  3:Reproduc           



                                                  tive risk           



                                                  Hazardous           



                                                  Drug --           



                                                  Refer to           



                                                  safe           



                                                  handling           



                                                  procedure           



                                                  [facility]           

 

     valproic            No                       Notes:           [facility]



     acid 100      4-10                               Dilute in           l



     mg/mL      17:00:                               at least           [place]



     intravenous      00                                 50ml D5W           



     solution +                                         or NS.           



     Sodium                                         Infusion           



     Chloride                                         rate = 20           



     0.9% IV 50                                         mg/min           



     mL                                           (Same As:           



                                                  [facility])           



                                                  Hazardous           



                                                  Drug Group           



                                                  3:Reproduc           



                                                  tive risk           



                                                  Hazardous           



                                                  Drug --           



                                                  Refer to           



                                                  safe           



                                                  handling           



                                                  procedure           



                                                  [facility]           

 

     valproic            No                       Notes:           [facility]



     acid 100      4-10                               Dilute in           l



     mg/mL      17:00:                               at least           [place]



     intravenous      00                                 50ml D5W           



     solution +                                         or NS.           



     Sodium                                         Infusion           



     Chloride                                         rate = 20           



     0.9% IV 50                                         mg/min           



     mL                                           (Same As:           



                                                  [facility])           



                                                  Hazardous           



                                                  Drug Group           



                                                  3:Reproduc           



                                                  tive risk           



                                                  Hazardous           



                                                  Drug --           



                                                  Refer to           



                                                  safe           



                                                  handling           



                                                  procedure           



                                                  [facility]           

 

     Solu-MEDROL            No                       Notes:           [facility]

[facility]ev



               4-10                               (Same           l



               16:43:                               as:Solu-ME           [facility]



               00                                 DROL,           



                                                  A-Methapre           



                                                  d) ***           



                                                  MEDICATION           



                                                  WASTE ***           



                                                  Product           



                                                  Size: 1000           



                                                  mg Product           



                                                  Wasted:           



                                                  _0__ mg           

 

     magnesium            No                       Notes:           [facility]

a



     sulfate      4-10                               WASTE: F/P           l



               16:43:                               - Sink; E                                            -              



                                                  Municipal           



                                                  Trash Bin           

 

     Solu-MEDROL            No                       Notes:           [facility]

ra[facility]ev



               4-10                               (Same           l



               16:43:                               as:Solu-ME           [place]



               00                                 DROL,           



                                                  A-Methapre           



                                                  d) ***           



                                                  MEDICATION           



                                                  WASTE ***           



                                                  Product           



                                                  Size: 1000           



                                                  mg Product           



                                                  Wasted:           



                                                  _0__ mg           

 

     magnesium            No                       Notes:           [facility]

a



     sulfate      4-10                               WASTE: F/P           l



               16:43:                               - Sink; E                                            -              



                                                  Municipal           



                                                  Trash Bin           

 

     Solu-MEDROL            No                       Notes:           [facility]

rajeev



               4-10                               (Same           l



               16:43:                               as:Solu-ME           [place]



               00                                 DROL,           



                                                  A-Methapre           



                                                  d) ***           



                                                  MEDICATION           



                                                  WASTE ***           



                                                  Product           



                                                  Size: 1000           



                                                  mg Product           



                                                  Wasted:           



                                                  _0__ mg           

 

     magnesium            No                       Notes:           [facility]

a



     sulfate      4-10                               WASTE: F/P           l



               16:43:                               - Sink; E                                            -              



                                                  Municipal           



                                                  Trash Bin           

 

     amitriptyli            No                       Notes:           [facility]

[facility]



     ne        4-10                               (Same as:           l



               02:00:                               Elavil)                                                           

 

     amitriptyli            No                       Notes:           [facility]

[facility]



     ne        4-10                               (Same as:           l



               02:00:                               Elavil)                                                           

 

     amitriptyli            No                       Notes:           [facility]

[facility]



     ne        4-10                               (Same as:           l



               02:00:                               Elavil)                                                           

 

     SUMAtriptan            No                       Notes:           [facility]

[facility]



               4-09                               (Same As:           l



               18:45:                               Imitrex)                                                           

 

     SUMAtriptan            No                       Notes:           [facility]

[facility]



               4-09                               (Same As:           l



               18:45:                               Imitrex)                                                           

 

     SUMAtriptan            No                       Notes:           [facility]

[facility]



               4-09                               (Same As:           l



               18:45:                               Imitrex)                                                           

 

     promethazin            No                       6.25 mg,           [facility]

[facility]



     e         4-09                               Route:           l



               18:44:                               IVPB,           [place]



               00                                 Q12H,           



                                                  Dosing           



                                                  Weight           



                                                  127.727,           



                                                  kg, PRN           



                                                  Nausea &           



                                                  Vomiting,           



                                                  Start           



                                                  date:           



                                                  22           



                                                  13:44:00           



                                                  CDT,           



                                                  Duration:           



                                                  30 day,           



                                                  Stop date:           



                                                  22           



                                                  13:43:00           



                                                  CDT            

 

     promethazin            No                       6.25 mg,           [facility]

[facility]



     e         -                               Route:           l



               18:44:                               IVPB,           [facility]



               00                                 Q12H,           



                                                  Dosing           



                                                  Weight           



                                                  127.727,           



                                                  kg, PRN           



                                                  Nausea &           



                                                  Vomiting,           



                                                  Start           



                                                  date:           



                                                  22           



                                                  13:44:00           



                                                  CDT,           



                                                  Duration:           



                                                  30 day,           



                                                  Stop date:           



                                                  22           



                                                  13:43:00           



                                                  CDT            

 

     promethazin            No                       6.25 mg,           Me

[facility]



     e         -                               Route:           l



               18:44:                               IVPB,           [facility]



               00                                 Q12H,           



                                                  Dosing           



                                                  Weight           



                                                  127.727,           



                                                  kg, PRN           



                                                  Nausea &           



                                                  Vomiting,           



                                                  Start           



                                                  date:           



                                                  22           



                                                  13:44:00           



                                                  CDT,           



                                                  Duration:           



                                                  30 day,           



                                                  Stop date:           



                                                  22           



                                                  13:43:00           



                                                  CDT            

 

     Benadryl            No                       Notes:           [facility]



               4-09                               (Same as:           l



               18:37:                               Benadryl)                                                           

 

     Benadryl            No                       Notes:           [facility]



               4-09                               (Same as:           l



               18:37:                               Benadryl)                                                           

 

     Benadryl            No                       Notes:           [facility]



               4-09                               (Same as:           l



               18:37:                               Benadryl)                                                           

 

     Dilaudid            No                       Notes:           [facility]



               4-09                               (Same as:           l



               18:36:                               Dilaudid)                                                           

 

     Dilaudid            No                       Notes:           [facility]



               4-09                               (Same as:           l



               18:36:                               Dilaudid)           [place]



                                                               

 

     Dilaudid            No                       Notes:           [facility]



               4-09                               (Same as:           l



               18:36:                               Dilaudid)           [facility]                                                

 

     Phenergan            No                       Notes:           [facility]

a



               4-09                               (Same as:           l



               18:33:                               Phenergan)           [facility]



               00                                 6.25 mg =           



                                                  1/2 x 12.5           



                                                  mg TAB           

 

     Phenergan            No                       Notes:           [facility]

a



               4-09                               (Same as:           l



               18:33:                               Phenergan)           [place]



               00                                 6.25 mg =           



                                                  1/2 x 12.5           



                                                  mg TAB           

 

     Phenergan            No                       Notes:           [facility]

a



               4-09                               (Same as:           l



               18:33:                               Phenergan)           [place]



               00                                 6.25 mg =           



                                                  1/2 x 12.5           



                                                  mg TAB           

 

     ascorbic            No                       Notes:           [facility]



     acid      4-09                               (Same as:           l



               14:00:                               Vitamin C)                                                           

 

     celecoxib            No                       Notes:           [facility]

a



               4-09                               NSAID.           l



               14:00:                               Please           [place]



               00                                 check           



                                                  indication           



                                                  . Not for           



                                                  seizure.           



                                                  (Same As:           



                                                  CeleBREX)           

 

     Lidoderm 5%            No                       Notes:           [facility]

[facility]



     topical      -                               Apply only           l



     film      14:00:                               once for           [facility]



     (patch)      00                                 up to 12           



                                                  hours in a           



                                                  24-hour           



                                                  period (12           



                                                  hours on           



                                                  and 12           



                                                  hours           



                                                  off).           



                                                  (Same as:           



                                                  Aspercreme           



                                                  Lidocaine           



                                                  Patch)           



                                                  "Remove           



                                                  old patch           



                                                  before           



                                                  applicatio           



                                                  n of new           



                                                  patch"           

 

     zinc            No                       Notes:           [facility]



     sulfate      -                               (Zinc           l



               14:00:                               sulfate           [place]



               00                                 capsule) -           



                                                  220 mg           



                                                  Zinc           



                                                  sulfate =           



                                                  50 mg           



                                                  elemental           



                                                  zinc Same           



                                                  as Zinc           



                                                  Sulfate           

 

     ascorbic            No                       Notes:           [facility]



     acid      4-09                               (Same as:           l



               14:00:                               Vitamin C)           [place]



                                                               

 

     celecoxib            No                       Notes:           [facility]

a



               4-09                               NSAID.           l



               14:00:                               Please           [place]



               00                                 check           



                                                  indication           



                                                  . Not for           



                                                  seizure.           



                                                  (Same As:           



                                                  CeleBREX)           

 

     Lidoderm 5%            No                       Notes:           [facility]

[facility]



     topical      -09                               Apply only           l



     film      14:00:                               once for           [facility]



     (patch)      00                                 up to 12           



                                                  hours in a           



                                                  24-hour           



                                                  period (12           



                                                  hours on           



                                                  and 12           



                                                  hours           



                                                  off).           



                                                  (Same as:           



                                                  Aspercreme           



                                                  Lidocaine           



                                                  Patch)           



                                                  "Remove           



                                                  old patch           



                                                  before           



                                                  applicatio           



                                                  n of new           



                                                  patch"           

 

     zinc            No                       Notes:           [facility]



     sulfate                                     (Zinc           l



               14:00:                               sulfate           [facility]



               00                                 capsule) -           



                                                  220 mg           



                                                  Zinc           



                                                  sulfate =           



                                                  50 mg           



                                                  elemental           



                                                  zinc Same           



                                                  as Zinc           



                                                  Sulfate           

 

     ascorbic            No                       Notes:           [facility]



     acid                                     (Same as:           l



               14:00:                               Vitamin C)           [place]



               00                                                

 

     celecoxib            No                       Notes:           [facility]

a



               -                               NSAID.           l



               14:00:                               Please           [facility]



               00                                 check           



                                                  indication           



                                                  . Not for           



                                                  seizure.           



                                                  (Same As:           



                                                  CeleBREX)           

 

     Lidoderm 5%            No                       Notes:           [facility]

ra[facility]



     topical                                     Apply only           l



     film      14:00:                               once for           [facility]



     (patch)      00                                 up to 12           



                                                  hours in a           



                                                  24-hour           



                                                  period (12           



                                                  hours on           



                                                  and 12           



                                                  hours           



                                                  off).           



                                                  (Same as:           



                                                  Aspercreme           



                                                  Lidocaine           



                                                  Patch)           



                                                  "Remove           



                                                  old patch           



                                                  before           



                                                  applicatio           



                                                  n of new           



                                                  patch"           

 

     zinc            No                       Notes:           [facility]



     sulfate                                     (Zinc           l



               14:00:                               sulfate           [facility]



               00                                 capsule) -           



                                                  220 mg           



                                                  Zinc           



                                                  sulfate =           



                                                  50 mg           



                                                  elemental           



                                                  zinc Same           



                                                  as Zinc           



                                                  Sulfate           

 

     cefepime +            No                       Notes:           [facility]

ia



     sterile                                     (Same As:           l



     water 10 mL      12:00:                               Maxipime)           [facility]

[facility]



               00                                 ***            



                                                  MEDICATION           



                                                  WASTE ***           



                                                  Product           



                                                  Size: 1000           



                                                  mg Product           



                                                  Wasted:           



                                                  _0__ mg           

 

     cefepime +            No                       Notes:           [facility]

[facility]



     sterile      -                               (Same As:           l



     water 10 mL      12:00:                               Maxipime)           [facility]

[facility]



               00                                 ***            



                                                  MEDICATION           



                                                  WASTE ***           



                                                  Product           



                                                  Size: 1000           



                                                  mg Product           



                                                  Wasted:           



                                                  _0__ mg           

 

     cefepime +            No                       Notes:           [facility]

ia



     sterile      -                               (Same As:           l



     water 10 mL      12:00:                               Maxipime)           [facility]

[facility]



               00                                 ***            



                                                  MEDICATION           



                                                  WASTE ***           



                                                  Product           



                                                  Size: 1000           



                                                  mg Product           



                                                  Wasted:           



                                                  _0__ mg           

 

     hydromorpho            No                       Notes:           [facility]

[facility]



     ne                                       (Same as:           l



               09:39:                               Dilaudid)           [place]



               00                                                

 

     hydromorpho            No                       Notes:           [facility]

[facility]



     ne                                       (Same as:           l



               09:39:                               Dilaudid)           [facility]



               00                                                

 

     hydromorpho            No                       Notes:           [facility]

[facility]



     ne        4-09                               (Same as:           l



               09:39:                               Dilaudid)           [place]



                                                               

 

     methocarbam            No                       Notes:           [facility]

[facility]



     ol        4-09                               (Same           l



               05:00:                               as:Robaxin           [place]



                                                )              

 

     methocarbam            No                       Notes:           [facility]

[facility]



     ol        4-09                               (Same           l



               05:00:                               as:Robaxin           [place]



                                                )              

 

     methocarbam            No                       Notes:           [facility]

[facility]



     ol        4-09                               (Same           l



               05:00:                               as:Robaxin           [facility]



                                                )              

 

     docusate            No                       Notes:           [facility]



               4-09                               (Same as:           l



               02:00:                               Colace)           [facility]



               00                                 (Do Not           



                                                  Crush)           

 

     senna            No                       Notes:           [facility]



               4-09                               (Same as:           l



               02:00:                               Senokot)           [facility]



                                                               

 

     Saline            No                       Notes:           [facility]



     Flush 0.9%      4-09                               (Same as:           l



               02:00:                               BD             [facility]



               00                                 Posiflush)           

 

     topiramate            No                       Notes:           [facility]

[facility]



               4-09                               (Same As:           l



               02:00:                               Topamax)           [facility]



               00                                 "Do Not           



                                                  Crush"           



                                                  Hazardous           



                                                  Drug Group           



                                                  3:Reproduc           



                                                  tive risk           



                                                  Hazardous           



                                                  Drug --           



                                                  Refer to           



                                                  safe           



                                                  handling           



                                                  procedure           



                                                  PPE Matrix           

 

     remove            No                       Notes:           [facility]



     patch      4-09                               Remove           l



               02:00:                               patch 12           [facility]



               00                                 hours           



                                                  after           



                                                  applicatio           



                                                  n each           



                                                  day.           

 

     docusate            No                       Notes:           [facility]



               4-09                               (Same as:           l



               02:00:                               Colace)           [place]



               00                                 (Do Not           



                                                  Crush)           

 

     senna            No                       Notes:           [facility]



               4-09                               (Same as:           l



               02:00:                               Senokot)           [place]



               00                                                

 

     Saline            No                       Notes:           [facility]



     Flush 0.9%      4-09                               (Same as:           l



               02:00:                               BD             [facility]



               00                                 Posiflush)           

 

     topiramate            No                       Notes:           [facility]

[facility]



               4-09                               (Same As:           l



               02:00:                               Topamax)           [place]



               00                                 "Do Not           



                                                  Crush"           



                                                  Hazardous           



                                                  Drug Group           



                                                  3:Reproduc           



                                                  tive risk           



                                                  Hazardous           



                                                  Drug --           



                                                  Refer to           



                                                  safe           



                                                  handling           



                                                  procedure           



                                                  PPE Matrix           

 

     remove            No                       Notes:           [facility]



     patch      4-09                               Remove           l



               02:00:                               patch 12           [place]



               00                                 hours           



                                                  after           



                                                  applicatio           



                                                  n each           



                                                  day.           

 

     docusate            No                       Notes:           [facility]



               4-09                               (Same as:           l



               02:00:                               Colace)           [place]



               00                                 (Do Not           



                                                  Crush)           

 

     senna            No                       Notes:           [facility]



               4-09                               (Same as:           l



               02:00:                               Senokot)           [place]



               00                                                

 

     Saline            No                       Notes:           [facility]



     Flush 0.9%                                     (Same as:           l



               02:00:                               BD             [place]



               00                                 Posiflush)           

 

     topiramate            No                       Notes:           [facility]

[facility]



               -                               (Same As:           l



               02:00:                               Topamax)           [facility]



               00                                 "Do Not           



                                                  Crush"           



                                                  Hazardous           



                                                  Drug Group           



                                                  3:Reproduc           



                                                  tive risk           



                                                  Hazardous           



                                                  Drug --           



                                                  Refer to           



                                                  safe           



                                                  handling           



                                                  procedure           



                                                  PPE Matrix           

 

     remove            No                       Notes:           [facility]



     patch      4-09                               Remove           l



               02:00:                               patch 12           [place]



               00                                 hours           



                                                  after           



                                                  applicatio           



                                                  n each           



                                                  day.           

 

     acetaminoph            No                       Notes: Max           

[facility]



     en        4-09                               acetaminop           l



               01:00:                               hen 4000           [place]



               00                                 mg/day (4           



                                                  gm/day).           



                                                  (Same as:           



                                                  Tylenol           



                                                  Extra           



                                                  Strength)           

 

     acetaminoph            No                       Notes: [facility]           

[facility]



     en        4-09                               acetaminop           l



               01:00:                               hen 4000           [place]



               00                                 mg/day (4           



                                                  gm/day).           



                                                  (Same as:           



                                                  Tylenol           



                                                  Extra           



                                                  Strength)           

 

     acetaminoph            No                       Notes: Max           

[facility]



     en        4-09                               acetaminop           l



               01:00:                               hen 4000           [place]



               00                                 mg/day (4           



                                                  gm/day).           



                                                  (Same as:           



                                                  Tylenol           



                                                  Extra           



                                                  Strength)           

 

     oxyCODONE            No                       Notes:           [facility]

a



     immediate      -                               (Same as:           l



     release      00:09:                               Roxicodone           [facility]

[facility]



               00                                 )              

 

     acetaminoph            No                       Notes: Do           [facility]

[facility]



     en-hydrocod      -                               not exceed           l



     one 325      00:09:                               4gm/day of           [facility]

[facility]



     mg-10 mg      00                                 acetaminop           



     oral tablet                                         hen. (Same           



                                                  as: Norco           



                                                  325/10)           

 

     oxyCODONE            No                       Notes:           [facility]

a



     immediate      -09                               (Same as:           l



     release      00:09:                               Roxicodone           [facility]

[facility]



               00                                 )              

 

     acetaminoph            No                       Notes: Do           [facility]

[facility]



     en-hydrocod      4-09                               not exceed           l



     one 325      00:09:                               4gm/day of           [facility]

j[facility]



     mg-10 mg      00                                 acetaminop           



     oral tablet                                         hen. (Same           



                                                  as: Norco           



                                                  325/10)           

 

     oxyCODONE            No                       Notes:           [facility]

a



     immediate      4-09                               (Same as:           l



     release      00:09:                               Roxicodone           [facility]

[facility]



               00                                 )              

 

     acetaminoph            No                       Notes: Do           M

[facility]



     en-hydrocod      4-09                               not exceed           l



     one 325      00:09:                               4gm/day of           [facility]

j[facility]



     mg-10 mg      00                                 acetaminop           



     oral tablet                                         hen. (Same           



                                                  as: Norco           



                                                  325/10)           

 

     LORazepam            No                       Notes:           [facility]

a



               4-09                               (Same as:           l



               00:06:                               Ativan)           [place]



                                                               

 

     oxyCODONE            No                       Notes:           [facility]

a



     immediate      4-09                               (Same as:           l



     release      00:06:                               Roxicodone           [facility]

jorge



               00                                 )              

 

     LORazepam            No                       Notes:           [facility]

a



               4-09                               (Same as:           l



               00:06:                               Ativan)           [place]



                                                               

 

     oxyCODONE            No                       Notes:           [facility]

a



     immediate      4-09                               (Same as:           l



     release      00:06:                               Roxicodone           [facility]

jorge



               00                                 )              

 

     LORazepam            No                       Notes:           [facility]

a



               4-09                               (Same as:           l



               00:06:                               Ativan)           [facility]



                                                               

 

     oxyCODONE            No                       Notes:           [facility]

a



     immediate      4-09                               (Same as:           l



     release      00:06:                               Roxicodone           [facility]

jorge



               00                                 )              

 

     Sodium            No                       1,000 mL,           [facility]

a



     Chloride      08                               Rate: 75           l



     0.9% IV      23:43:                               ml/hr,           [place]



     1,000 mL      00                                 Infuse           



                                                  over: 13.3           



                                                  hr, Route:           



                                                  IV, Dosing           



                                                  Weight           



                                                  127.727           



                                                  kg, Total           



                                                  Volume:           



                                                  1,000,           



                                                  Start           



                                                  date:           



                                                  22           



                                                  18:43:00           



                                                  CDT,           



                                                  Duration:           



                                                  30 day,           



                                                  Stop date:           



                                                  22           



                                                  18:42:00           



                                                  CDT, BSA:           



                                                  2.37 m2, 0           

 

     acetaminoph            No                       Notes: Do           M

[facility]



     en        4-08                               not exceed           l



               23:43:                               4 gm/day.           [place]



               00                                 (Same as:           



                                                  Tylenol)           

 

     acetaminoph            No                       Notes:           [facility]

rajeev



     en-hydrocod      4-08                               (Same as:           l



     one 325      23:43:                               Norco           [facility]



     mg-5 mg      00                                 325/5) Do           



     oral tablet                                         not exceed           



                                                  4gm/day of           



                                                  acetaminop           



                                                  hen.           

 

     acetaminoph            No                       Notes: Do           M

[facility]



     en-hydrocod      4-08                               not exceed           l



     one 325      23:43:                               4gm/day of           [facility]

jorge



     mg-10 mg      00                                 acetaminop           



     oral tablet                                         hen. (Same           



                                                  as: Norco           



                                                  325/10)           

 

     bisacodyl            No                       Notes:           [facility]

a



               4-08                               (Same As:           l



               23:43:                               Dulcolax,           [facility]



                                                Bisco-Lax)           

 

     hydrALAZINE            No                       Notes:           [facility]

rajeev



               -08                               (Same as:           l



               23:43:                               Apresoline                                            ) Push           



                                                  over 5           



                                                  minutes           

 

     labetalol            No                       10 mg, 2           [facility]

rajeev



               -08                               mL, Route:           l



               23:43:                               IVP, Drug                                            form: INJ,           



                                                  Q15Min,           



                                                  Dosing           



                                                  Weight           



                                                  127.727,           



                                                  kg, Start           



                                                  date:           



                                                  22           



                                                  18:43:00           



                                                  CDT,           



                                                  Duration:           



                                                  3 doses or           



                                                  times,           



                                                  Stop date:           



                                                  22           



                                                  19:13:00           



                                                  CDT, 0           

 

     Saline            No                       Notes:           [facility]



     Flush 0.9%                                     (Same as:           l



               23:43:                               BD             [place]                                 Posiflush)           

 

     Sodium            No                       1,000 mL,           [facility]

a



     Chloride                                     Rate: 75           l



     0.9% IV      23:43:                               ml/hr,           [facility]



     1,000 mL      00                                 Infuse           



                                                  over: 13.3           



                                                  hr, Route:           



                                                  IV, Dosing           



                                                  Weight           



                                                  127.727           



                                                  kg, Total           



                                                  Volume:           



                                                  1,000,           



                                                  Start           



                                                  date:           



                                                  22           



                                                  18:43:00           



                                                  CDT,           



                                                  Duration:           



                                                  30 day,           



                                                  Stop date:           



                                                  22           



                                                  18:42:00           



                                                  CDT, BSA:           



                                                  2.37 m2, 0           

 

     acetaminoph            No                       Notes: Do           M

[facility]



     en        4-08                               not exceed           l



               23:43:                               4 gm/day.           [facility]



               00                                 (Same as:           



                                                  Tylenol)           

 

     acetaminoph            No                       Notes:           [facility]

rajeev



     en-hydrocod      4-08                               (Same as:           l



     one 325      23:43:                               Norco           [facility]



     mg-5 mg      00                                 325/5) Do           



     oral tablet                                         not exceed           



                                                  4gm/day of           



                                                  acetaminop           



                                                  hen.           

 

     acetaminoph            No                       Notes: Do           M

[facility]



     en-hydrocod      4-08                               not exceed           l



     one 325      23:43:                               4gm/day of           [facility]

jorge



     mg-10 mg      00                                 acetaminop           



     oral tablet                                         hen. (Same           



                                                  as: Norco           



                                                  325/10)           

 

     bisacodyl            No                       Notes:           [facility]

a



               4-08                               (Same As:           l



               23:43:                               Dulcolax,           [facility]



                                                Bisco-Lax)           

 

     hydrALAZINE            No                       Notes:           [facility]

rajeev



               4-08                               (Same as:           l



               23:43:                               Apresoline                                            ) Push           



                                                  over 5           



                                                  minutes           

 

     labetalol            No                       10 mg, 2           [facility]

rajeev



               4-08                               mL, Route:           l



               23:43:                               IVP, Drug                                            form: INJ,           



                                                  Q15Min,           



                                                  Dosing           



                                                  Weight           



                                                  127.727,           



                                                  kg, Start           



                                                  date:           



                                                  22           



                                                  18:43:00           



                                                  CDT,           



                                                  Duration:           



                                                  3 doses or           



                                                  times,           



                                                  Stop date:           



                                                  22           



                                                  19:13:00           



                                                  CDT, 0           

 

     Saline            No                       Notes:           [facility]



     Flush 0.9%      -08                               (Same as:           l



               23:43:                               BD             [facility]                                 Posiflush)           

 

     Sodium            No                       1,000 mL,           [facility]

a



     Chloride      -08                               Rate: 75           l



     0.9% IV      23:43:                               ml/hr,           [place]



     1,000 mL      00                                 Infuse           



                                                  over: 13.3           



                                                  hr, Route:           



                                                  IV, Dosing           



                                                  Weight           



                                                  127.727           



                                                  kg, Total           



                                                  Volume:           



                                                  1,000,           



                                                  Start           



                                                  date:           



                                                  22           



                                                  18:43:00           



                                                  CDT,           



                                                  Duration:           



                                                  30 day,           



                                                  Stop date:           



                                                  22           



                                                  18:42:00           



                                                  CDT, BSA:           



                                                  2.37 m2, 0           

 

     acetaminoph            No                       Notes: Do           M

[facility]



     en        4-08                               not exceed           l



               23:43:                               4 gm/day.           [facility]



               00                                 (Same as:           



                                                  Tylenol)           

 

     acetaminoph            No                       Notes:           [facility]

rajeev



     en-hydrocod      4-08                               (Same as:           l



     one 325      23:43:                               Norco           [place]



     mg-5 mg      00                                 325/5) Do           



     oral tablet                                         not exceed           



                                                  4gm/day of           



                                                  acetaminop           



                                                  hen.           

 

     acetaminoph            No                       Notes: Do           M

[facility]



     en-hydrocod      08                               not exceed           l



     one 325      23:43:                               4gm/day of           [facility]

jorge



     mg-10 mg      00                                 acetaminop           



     oral tablet                                         hen. (Same           



                                                  as: Norco           



                                                  325/10)           

 

     bisacodyl            No                       Notes:           [facility]

a



               08                               (Same As:           l



               23:43:                               Dulcolax,                                            Bisco-Lax)           

 

     hydrALAZINE            No                       Notes:           [facility]

rajeev



               08                               (Same as:           l



               23:43:                               Apresoline                                            ) Push           



                                                  over 5           



                                                  minutes           

 

     labetalol            No                       10 mg, 2           [facility]

rajeev



                                              mL, Route:           l



               23:43:                               IVP, Drug                                            form: INJ,           



                                                  Q15Min,           



                                                  Dosing           



                                                  Weight           



                                                  127.727,           



                                                  kg, Start           



                                                  date:           



                                                  22           



                                                  18:43:00           



                                                  CDT,           



                                                  Duration:           



                                                  3 doses or           



                                                  times,           



                                                  Stop date:           



                                                  22           



                                                  19:13:00           



                                                  CDT, 0           

 

     Saline            No                       Notes:           [facility]



     Flush 0.9%                                     (Same as:           l



               23:43:                               BD                                              Posiflush)           

 

     NaCl 0.9%                   1000mL      at 999           [facility]

vers



     (NS) bolus      04                          mL/hr,           ity of



     infusion      05:00: 05:59                          1,000 mL,           [facility]

as



     1,000 mL      00   :00                           IV             [facility]



                                                  Piggyback,           [facility]



                                                  ONCE, 1           



                                                  dose, On           



                                                  22           



                                                  at 0000,           



                                                  STAT           

 

     diphenhydrA       No             25mg      25 mg,           [facility]i

vers



     MINE      -04                          Slow IV           ity of



     (BENADRYL)      05:00: 04:47                          Push,           [place]



     injection      00   :00                           ONCE, 1           [facility]



     25 mg                                         dose, On           [facility]



                                                  22           



                                                  at 0000,           



                                                  STAT           

 

     haloperidol       No             2.5mg      2.5 mg,           U

nivers



     lactate      -04                          Intravenou           ity o

f



     (HALDOL)      05:00: 04:47                          s, ONCE, 1           [facility]

[facility]



     injection      00   :00                           dose, On           [facility]

l



     2.5 mg                                         Mon 22           [facility]



                                                  at 0000,           



                                                  STAT           

 

     topiramate            Yes                      25 mg = 1           Me

moria



     25 mg oral      3-29                               cap, PO,           l



     capsule      13:44:                               Q12H, # 60           [facility]

jorge



               00                                 cap, 0           



                                                  Refill(s),           



                                                  Pharmacy:           



                                                  [facility]           



                                                  cy #[id number],           



                                                  149.86,           



                                                  cm,            



                                                  22           



                                                  1:18:00           



                                                  CDT,           



                                                  Height,           



                                                  127.727,           



                                                  kg,            



                                                  22           



                                                  1:18:00           



                                                  CDT,           



                                                  Weight           

 

     topiramate            Yes                      25 mg = 1           Me

moria



     25 mg oral      3-29                               cap, PO,           l



     capsule      13:44:                               Q12H, # 60           [facility]

jorge



               00                                 cap, 0           



                                                  Refill(s),           



                                                  Pharmacy:           



                                                  [facility]           



                                                  cy #[id number],           



                                                  149.86,           



                                                  cm,            



                                                  22           



                                                  1:18:00           



                                                  CDT,           



                                                  Height,           



                                                  127.727,           



                                                  kg,            



                                                  22           



                                                  1:18:00           



                                                  CDT,           



                                                  Weight           

 

     topiramate            Yes                      25 mg = 1           Me

moria



     25 mg oral      3-29                               cap, PO,           l



     capsule      13:44:                               Q12H, # 60           [facility]

jorge



               00                                 cap, 0           



                                                  Refill(s),           



                                                  Pharmacy:           



                                                  [facility]           



                                                  cy #[id number],           



                                                  149.86,           



                                                  cm,            



                                                  22           



                                                  1:18:00           



                                                  CDT,           



                                                  Height,           



                                                  127.727,           



                                                  kg,            



                                                  22           



                                                  1:18:00           



                                                  CDT,           



                                                  Weight           

 

     topiramate            No                       Notes:           [facility]

ia



               3-28                               Hazardous           l



               22:05:                               Drug Group           [place]



                                                3:Reproduc           



                                                  tive risk           



                                                  Hazardous           



                                                  Drug --           



                                                  Refer to           



                                                  safe           



                                                  handling           



                                                  procedure           



                                                  PPE Matrix           



                                                  Sprinkle           



                                                  formulatio           



                                                  n. (Same           



                                                  As:            



                                                  Topamax)           

 

     topiramate            No                       Notes:           [facility]

ia



               3-28                               Hazardous           l



               22:05:                               Drug Group           [place]



                                                3:Reproduc           



                                                  tive risk           



                                                  Hazardous           



                                                  Drug --           



                                                  Refer to           



                                                  safe           



                                                  handling           



                                                  procedure           



                                                  PPE Matrix           



                                                  Sprinkle           



                                                  formulatio           



                                                  n. (Same           



                                                  As:            



                                                  Topamax)           

 

     topiramate            No                       Notes:           [facility]

ia



               3-28                               Hazardous           l



               22:05:                               Drug Group           [place]



                                                3:Reproduc           



                                                  tive risk           



                                                  Hazardous           



                                                  Drug --           



                                                  Refer to           



                                                  safe           



                                                  handling           



                                                  procedure           



                                                  PPE Matrix           



                                                  Sprinkle           



                                                  formulatio           



                                                  n. (Same           



                                                  As:            



                                                  Topamax)           

 

     Sonia            No                       Notes:           [facility]



     packet      3-28                               (Same as:           l



               21:30:                               Sonia           [place]



               00                                 Unflavored           



                                                  )              



                                                  Administer           



                                                  ing SONIA           



                                                  orally:           



                                                  Mix into           



                                                  8-10 fl oz           



                                                  of room           



                                                  temperatur           



                                                  e juice,           



                                                  soda, or           



                                                  water.           



                                                  Also mixes           



                                                  well with           



                                                  warm           



                                                  beverages,           



                                                  like           



                                                  coffee or           



                                                  tea. Can           



                                                  be mixed           



                                                  with           



                                                  applesauce           



                                                  .              



                                                  Thoroughly           



                                                  stir for           



                                                  30-60           



                                                  seconds or           



                                                  until           



                                                  completely           



                                                  dissolved           

 

     Sonia            No                       Notes:           [facility]



     packet      3-28                               (Same as:           l



               21:30:                               Sonia           [place]



               00                                 Unflavored           



                                                  )              



                                                  Administer           



                                                  ing SONIA           



                                                  orally:           



                                                  Mix into           



                                                  8-10 fl oz           



                                                  of room           



                                                  temperatur           



                                                  e juice,           



                                                  soda, or           



                                                  water.           



                                                  Also mixes           



                                                  well with           



                                                  warm           



                                                  beverages,           



                                                  like           



                                                  coffee or           



                                                  tea. Can           



                                                  be mixed           



                                                  with           



                                                  applesauce           



                                                  .              



                                                  Thoroughly           



                                                  stir for           



                                                  30-60           



                                                  seconds or           



                                                  until           



                                                  completely           



                                                  dissolved           

 

     Sonia            No                       Notes:           [facility]



     packet      3-28                               (Same as:           l



               21:30:                               Sonia           [place]



               00                                 Unflavored           



                                                  )              



                                                  Administer           



                                                  ing SONIA           



                                                  orally:           



                                                  Mix into           



                                                  8-10 fl oz           



                                                  of room           



                                                  temperatur           



                                                  e juice,           



                                                  soda, or           



                                                  water.           



                                                  Also mixes           



                                                  well with           



                                                  warm           



                                                  beverages,           



                                                  like           



                                                  coffee or           



                                                  tea. Can           



                                                  be mixed           



                                                  with           



                                                  applesauce           



                                                  .              



                                                  Thoroughly           



                                                  stir for           



                                                  30-60           



                                                  seconds or           



                                                  until           



                                                  completely           



                                                  dissolved           

 

     Lovenox            No                       Notes:           [facility]



               3-27                               (Same as:           l



               16:00:                               Lovenox)           [place]



               00                                                

 

     Lovenox            No                       Notes:           [facility]



               3-27                               (Same as:           l



               16:00:                               Lovenox)           [place]



               00                                                

 

     Lovenox            No                       Notes:           [facility]



               3-27                               (Same as:           l



               16:00:                               Lovenox)           [place]



               00                                                

 

     Magnesium            No                       Notes:           [facility]

a



     Sulfate      3-27                               WASTE: F/P           l



               15:54:                               - Sink; E           [place]



                                                -              



                                                  Municipal           



                                                  Trash Bin           

 

     methylPREDN            No                       Notes:           [facility]

raje[facility]



     ISolone      3-                               (Same           l



     SODium      15:54:                               as:Solu-ME           [place]

nn



     SUCCinate      00                                 DROL,           



                                                  A-Methapre           



                                                  d) ***           



                                                  MEDICATION           



                                                  WASTE ***           



                                                  Product           



                                                  Size: 1000           



                                                  mg Product           



                                                  Wasted:           



                                                  ___ mg           

 

     Magnesium            No                       Notes:           [facility]

a



     Sulfate      3-27                               WASTE: F/P           l



               15:54:                               - Sink; E           [place]



               00                                 -              



                                                  Municipal           



                                                  Trash Bin           

 

     methylPREDN            No                       Notes:           [facility]

rajeev



     ISolone      3-27                               (Same           l



     SODium      15:54:                               as:Solu-ME           [place]

nn



     SUCCinate      00                                 DROL,           



                                                  A-Methapre           



                                                  d) ***           



                                                  MEDICATION           



                                                  WASTE ***           



                                                  Product           



                                                  Size: 1000           



                                                  mg Product           



                                                  Wasted:           



                                                  ___ mg           

 

     Magnesium            No                       Notes:           [facility]

a



     Sulfate      3-27                               WASTE: F/P           l



               15:54:                               - Sink; E           [place]



               00                                 -              



                                                  Municipal           



                                                  Trash Bin           

 

     methylPREDN            No                       Notes:           [facility]

rajeev



     ISolone      3-27                               (Same           l



     SODium      15:54:                               as:Solu-ME           [place]

nn



     SUCCinate      00                                 DROL,           



                                                  A-Methapre           



                                                  d) ***           



                                                  MEDICATION           



                                                  WASTE ***           



                                                  Product           



                                                  Size: 1000           



                                                  mg Product           



                                                  Wasted:           



                                                  ___ mg           

 

     Dilaudid            No                       Notes:           [facility]



               3-27                               (Same as:           l



               13:28:                               Dilaudid)           [place]



               00                                                

 

     Dilaudid            No                       Notes:           [facility]



               3-27                               (Same as:           l



               13:28:                               Dilaudid)           [place]



               00                                                

 

     Dilaudid            No                       Notes:           [facility]



               3-27                               (Same as:           l



               13:28:                               Dilaudid)           [place]



               00                                                

 

     remove            No                       Notes:           [facility]



     patch      3-27                               Remove           l



               02:00:                               patch 12           [place]



               00                                 hours           



                                                  after           



                                                  applicatio           



                                                  n each           



                                                  day.           

 

     remove            No                       Notes:           [facility]



     patch      3-27                               Remove           l



               02:00:                               patch 12           [place]



               00                                 hours           



                                                  after           



                                                  applicatio           



                                                  n each           



                                                  day.           

 

     remove            No                       Notes:           [facility]



     patch      3-27                               Remove           l



               02:00:                               patch 12           [place]



               00                                 hours           



                                                  after           



                                                  applicatio           



                                                  n each           



                                                  day.           

 

     Lyrica            No                       Notes:           [facility]



               3-26                               (Same as:           l



               21:58:                               Lyrica)           [place]



               00                                                

 

     Naproxen            No                       Notes:           [facility]



               3-26                               (Same as:           l



               21:58:                               Naprosyn)           [place]



               00                                 Take with           



                                                  food.           

 

     Lyrica            No                       Notes:           [facility]



               3-26                               (Same as:           l



               21:58:                               Lyrica)           [place]



               00                                                

 

     Naproxen            No                       Notes:           [facility]



               3-26                               (Same as:           l



               21:58:                               Naprosyn)           [place]



               00                                 Take with           



                                                  food.           

 

     Lyrica            No                       Notes:           [facility]



               3-26                               (Same as:           l



               21:58:                               Lyrica)                                                           

 

     Naproxen            No                       Notes:           [facility]



               3-26                               (Same as:           l



               21:58:                               Naprosyn)                                            Take with           



                                                  food.           

 

     Ascorbic            No                       Notes:           [facility]



     Acid      3-26                               (Same as:           l



               14:00:                               Vitamin C)                                                           

 

     Zinc            No                       Notes:           [facility]



     Sulfate      3-26                               (Zinc           l



               14:00:                               sulfate           [place]



               00                                 capsule) -           



                                                  220 mg           



                                                  Zinc           



                                                  sulfate =           



                                                  50 mg           



                                                  elemental           



                                                  zinc Same           



                                                  as Zinc           



                                                  Sulfate           

 

     multivitami            No                       Notes:           [facility]

[facility]



     n         3-26                               (Same           l



               14:00:                               as:Thera)                                            WASTE: F/P           



                                                  - Black; E           



                                                  -              



                                                  Municipal           



                                                  Trash Bin           



                                                  Take with           



                                                  food.           

 

     Lidocaine            No                       Notes:           [facility]

a



     0.05 MG/MG      3-26                               Apply only           l



     Transdermal      14:00:                               once for           [facility]

[facility]



     Patch      00                                 up to 12           



                                                  hours in a           



                                                  24-hour           



                                                  period (12           



                                                  hours on           



                                                  and 12           



                                                  hours           



                                                  off).           



                                                  (Same as:           



                                                  Aspercreme           



                                                  Lidocaine           



                                                  Patch)           



                                                  "Remove           



                                                  old patch           



                                                  before           



                                                  applicatio           



                                                  n of new           



                                                  patch"           

 

     Ascorbic            No                       Notes:           [facility]



     Acid      3-26                               (Same as:           l



               14:00:                               Vitamin C)                                                           

 

     Zinc            No                       Notes:           [facility]



     Sulfate      3-26                               (Zinc           l



               14:00:                               sulfate           [facility]



               00                                 capsule) -           



                                                  220 mg           



                                                  Zinc           



                                                  sulfate =           



                                                  50 mg           



                                                  elemental           



                                                  zinc Same           



                                                  as Zinc           



                                                  Sulfate           

 

     multivitami            No                       Notes:           [facility]

rajeev



     n         3-26                               (Same           l



               14:00:                               as:Thera)                                            WASTE: F/P           



                                                  - Black; E           



                                                  -              



                                                  Municipal           



                                                  Trash Bin           



                                                  Take with           



                                                  food.           

 

     Lidocaine            No                       Notes:           [facility]

a



     0.05 MG/MG      3-26                               Apply only           l



     Transdermal      14:00:                               once for           He

rmann



     Patch      00                                 up to 12           



                                                  hours in a           



                                                  24-hour           



                                                  period (12           



                                                  hours on           



                                                  and 12           



                                                  hours           



                                                  off).           



                                                  (Same as:           



                                                  Aspercreme           



                                                  Lidocaine           



                                                  Patch)           



                                                  "Remove           



                                                  old patch           



                                                  before           



                                                  applicatio           



                                                  n of new           



                                                  patch"           

 

     Ascorbic            No                       Notes:           [facility]



     Acid      3-26                               (Same as:           l



               14:00:                               Vitamin C)           [facility]



               00                                                

 

     Zinc            No                       Notes:           [facility]



     Sulfate      3-26                               (Zinc           l



               14:00:                               sulfate                                            capsule) -           



                                                  220 mg           



                                                  Zinc           



                                                  sulfate =           



                                                  50 mg           



                                                  elemental           



                                                  zinc Same           



                                                  as Zinc           



                                                  Sulfate           

 

     multivitami            No                       Notes:           [facility]

rajeev



     n         3-                               (Same           l



               14:00:                               as:Thera)                                            WASTE: F/P           



                                                  - Black; E           



                                                  -              



                                                  Municipal           



                                                  Trash Bin           



                                                  Take with           



                                                  food.           

 

     Lidocaine            No                       Notes:           [facility]

a



     0.05 MG/MG      3-26                               Apply only           l



     Transdermal      14:00:                               once for           He

rmann



     Patch      00                                 up to 12           



                                                  hours in a           



                                                  24-hour           



                                                  period (12           



                                                  hours on           



                                                  and 12           



                                                  hours           



                                                  off).           



                                                  (Same as:           



                                                  Aspercreme           



                                                  Lidocaine           



                                                  Patch)           



                                                  "Remove           



                                                  old patch           



                                                  before           



                                                  applicatio           



                                                  n of new           



                                                  patch"           

 

     Celebrex            No                       Notes:           [facility]



               3-26                               NSAID.           l



               03:15:                               Please           [place]



                                                check           



                                                  indication           



                                                  . Not for           



                                                  seizure.           



                                                  (Same As:           



                                                  CeleBREX)           

 

     Robaxin            No                       Notes:           [facility]



               3-26                               (Same           l



               03:15:                               as:Robaxin                                            )              

 

     gabapentin            No                       Notes:           [facility]

ia



               3-26                               (Same as:           l



               03:15:                               Neurontin)                                                           

 

     Tramadol            No                       Notes: Not           [facility]

[facility]



               3-26                               to exceed           l



               03:15:                               400mg/day.           [facility]



                                                (Same As:           



                                                  Ultram)           

 

     Celebrex            No                       Notes:           [facility]



               3-26                               NSAID.           l



               03:15:                               Please           [facility]



               00                                 check           



                                                  indication           



                                                  . Not for           



                                                  seizure.           



                                                  (Same As:           



                                                  CeleBREX)           

 

     Robaxin            No                       Notes:           [facility]



               3-26                               (Same           l



               03:15:                               as:Robaxin           [facility]                                 )              

 

     gabapentin            No                       Notes:           [facility]

ia



               3-26                               (Same as:           l



               03:15:                               Neurontin)                                                           

 

     Tramadol            No                       Notes: Not           [facility]

[facility]



               3-26                               to exceed           l



               03:15:                               400mg/day.           [place]



                                                (Same As:           



                                                  Ultram)           

 

     Celebrex            No                       Notes:           [facility]



               3-26                               NSAID.           l



               03:15:                               Please           [facility]



               00                                 check           



                                                  indication           



                                                  . Not for           



                                                  seizure.           



                                                  (Same As:           



                                                  CeleBREX)           

 

     Robaxin            No                       Notes:           [facility]



               3-26                               (Same           l



               03:15:                               as:Robaxin           [place]                                 )              

 

     gabapentin            No                       Notes:           [facility]

ia



               3-26                               (Same as:           l



               03:15:                               Neurontin)           [facility]                                                

 

     Tramadol            No                       Notes: Not           [facility]

[facility]



               3-26                               to exceed           l



               03:15:                               400mg/day.           [place]                                 (Same As:           



                                                  Ultram)           

 

     Dexamethaso            No                       Notes: ***           

[facility]



     ne        3-                               MEDICATION           l



               03:00:                               WASTE ***           [facility]                                 Product           



                                                  Size: 10           



                                                  mg Product           



                                                  Wasted:           



                                                  ___ mg           

 

     Dexamethaso            No                       Notes: ***           

[facility]



     ne        3-                               MEDICATION           l



               03:00:                               WASTE ***           [place]                                 Product           



                                                  Size: 10           



                                                  mg Product           



                                                  Wasted:           



                                                  ___ mg           

 

     Dexamethaso            No                       Notes: ***           

[facility]



     ne        3-26                               MEDICATION           l



               03:00:                               WASTE ***           [facility]                                 Product           



                                                  Size: 10           



                                                  mg Product           



                                                  Wasted:           



                                                  ___ mg           

 

     Dilaudid            No                       Notes:           [facility]



               3-26                               Same as           l



               02:58:                               Dilaudid           [facility]



               00                                                

 

     Dilaudid            No                       Notes:           [facility]



               3-26                               Same as           l



               02:58:                               Dilaudid           [facility]



               00                                                

 

     Dilaudid            No                       Notes:           [facility]



               3-26                               Same as           l



               02:58:                               Dilaudid           [facility]



                                                               

 

     Acetaminoph            No                       Notes: Max           

[facility]



     en        3-25                               acetaminop           l



               22:38:                               hen =           [facility]



               00                                 4000mg/day           



                                                  (4             



                                                  gm/day).           



                                                  (Same as:           



                                                  Tylenol)           

 

     Acetaminoph            No                       Notes: Max           

[facility]



     en        3-25                               acetaminop           l



               22:38:                               hen =           [facility]



               00                                 4000mg/day           



                                                  (4             



                                                  gm/day).           



                                                  (Same as:           



                                                  Tylenol)           

 

     Acetaminoph            No                       Notes: Max           

[facility]



     en        3-25                               acetaminop           l



               22:38:                               hen =           [place]



               00                                 4000mg/day           



                                                  (4             



                                                  gm/day).           



                                                  (Same as:           



                                                  Tylenol)           

 

     Isolyte S            No                       Notes:           [facility]

a



     PH-7.4      3-25                               (Same as:           l



     (Bolus) IV      22:37:                               Isolyte S           He

rmann



               00                                 PH7.4,           



                                                  Normosol-R           



                                                  PH 7.4,           



                                                  Plasma-Lyt           



                                                  e A )           

 

     Magnesium            No                       Notes:           [facility]

a



     Sulfate      3-25                               WASTE: F/P           l



               22:37:                               - Sink; E           [facility]



                                                -              



                                                  Municipal           



                                                  Trash Bin           

 

     Isolyte S            No                       Notes:           [facility]

a



     PH-7.4      3-25                               (Same as:           l



     (Bolus) IV      22:37:                               Isolyte S           He

rmann



               00                                 PH7.4,           



                                                  Normosol-R           



                                                  PH 7.4,           



                                                  Plasma-Lyt           



                                                  e A )           

 

     Magnesium            No                       Notes:           [facility]

a



     Sulfate      3-25                               WASTE: F/P           l



               22:37:                               - Sink; E           [facility]



                                                -              



                                                  Municipal           



                                                  Trash Bin           

 

     Isolyte S            No                       Notes:           [facility]

a



     PH-7.4      3-25                               (Same as:           l



     (Bolus) IV      22:37:                               Isolyte S           He

rmann



               00                                 PH7.4,           



                                                  Normosol-R           



                                                  PH 7.4,           



                                                  Plasma-Lyt           



                                                  e A )           

 

     Magnesium            No                       Notes:           [facility]

a



     Sulfate      3-25                               WASTE: F/P           l



               22:37:                               - Sink; E           [facility]



                                                -              



                                                  Municipal           



                                                  Trash Bin           

 

     Iohexol            No                       100 mL,           [facility]



               3-25                               Route:           l



               20:04:                               IVP, Drug           [facility]



               00                                 Form:           



                                                  SOLN,           



                                                  Dosing           



                                                  Weight           



                                                  127.727,           



                                                  kg,            



                                                  ONCALL,           



                                                  STAT,           



                                                  Start           



                                                  date:           



                                                  22           



                                                  15:04:00           



                                                  CDT,           



                                                  Duration:           



                                                  1 doses or           



                                                  times,           



                                                  Dose =           



                                                  2.2ml/kg,           



                                                  Max dose =           



                                                  100ml --           



                                                  "To be           



                                                  infused by           



                                                  Radiology           



                                                  Staff           



                                                  ONLY"           

 

     Iohexol      0      No                       100 mL,           [facility]



               3-25                               Route:           l



               20:04:                               IVP, Drug           [facility]



               00                                 Form:           



                                                  SOLN,           



                                                  Dosing           



                                                  Weight           



                                                  127.727,           



                                                  kg,            



                                                  ONCALL,           



                                                  STAT,           



                                                  Start           



                                                  date:           



                                                  22           



                                                  15:04:00           



                                                  CDT,           



                                                  Duration:           



                                                  1 doses or           



                                                  times,           



                                                  Dose =           



                                                  2.2ml/kg,           



                                                  Max dose =           



                                                  100ml --           



                                                  "To be           



                                                  infused by           



                                                  Radiology           



                                                  Staff           



                                                  ONLY"           

 

     Iohexol      0      No                       100 mL,           [facility]



               3-25                               Route:           l



               20:04:                               IVP, Drug           [place]



               00                                 Form:           



                                                  SOLN,           



                                                  Dosing           



                                                  Weight           



                                                  127.727,           



                                                  kg,            



                                                  ONCALL,           



                                                  STAT,           



                                                  Start           



                                                  date:           



                                                  22           



                                                  15:04:00           



                                                  CDT,           



                                                  Duration:           



                                                  1 doses or           



                                                  times,           



                                                  Dose =           



                                                  2.2ml/kg,           



                                                  Max dose =           



                                                  100ml --           



                                                  "To be           



                                                  infused by           



                                                  Radiology           



                                                  Staff           



                                                  ONLY"           

 

     Docusate            No                       Notes:           [facility]



               3-25                               (Same as:           l



               14:00:                               Colace)           [facility]



                                                (Do Not           



                                                  Crush)           

 

     sennosides,            No                       Notes:           [facility]

[facility]



     USP       3-25                               (Same as:           l



               14:00:                               Senokot)           [facility]



                                                               

 

     Saline            No                       Notes:           [facility]



     Flush 0.9%      3-25                               preservati           l



               14:00:                               ve free.           [place]                                                

 

     Docusate            No                       Notes:           [facility]



               3-25                               (Same as:           l



               14:00:                               Colace)           [place]



                                                (Do Not           



                                                  Crush)           

 

     sennosides,            No                       Notes:           [facility]

rajeev



     USP       3-25                               (Same as:           l



               14:00:                               Senokot)           [place]



                                                               

 

     Saline            No                       Notes:           [facility]



     Flush 0.9%      3-25                               preservati           l



               14:00:                               ve free.           [place]



                                                               

 

     Docusate            No                       Notes:           [facility]



               3-25                               (Same as:           l



               14:00:                               Colace)           [facility]



                                                (Do Not           



                                                  Crush)           

 

     sennosides,            No                       Notes:           [facility]

rajeev



     USP       3-25                               (Same as:           l



               14:00:                               Senokot)           [facility]



                                                               

 

     Saline            No                       Notes:           [facility]



     Flush 0.9%      3-25                               preservati           l



               14:00:                               ve free.           [place]                                                

 

     influenza            Yes                      Notes:           [facility]

a



     virus      3-25                               (Same as:           l



     vaccine,      06:10:                               Fluzone           [facility]

n



     inactivated      26                                 [place]           



                                                  nt,            



                                                  Fluarix           



                                                  [place]           



                                                  nt) For           



                                                  patients 6           



                                                  - 35           



                                                  months of           



                                                  age (0.5           



                                                  mL IM) For           



                                                  3 years of           



                                                  age and           



                                                  older (0.5           



                                                  mL IM)           



                                                  Shake well           



                                                  before use           

 

     influenza            Yes                      Notes:           [facility]

a



     virus      3-25                               (Same as:           l



     vaccine,      06:10:                               Fluzone           [facility]

n



     inactivated      26                                 [place]           



                                                  nt,            



                                                  Fluarix           



                                                  [place]           



                                                  nt) For           



                                                  patients 6           



                                                  - 35           



                                                  months of           



                                                  age (0.5           



                                                  mL IM) For           



                                                  3 years of           



                                                  age and           



                                                  older (0.5           



                                                  mL IM)           



                                                  Shake well           



                                                  before use           

 

     influenza            Yes                      Notes:           [facility]

a



     virus      3-25                               (Same as:           l



     vaccine,      06:10:                               Fluzone           [facility]

n



     inactivated      26                                 [place]           



                                                  nt,            



                                                  Fluarix           



                                                  [place]           



                                                  nt) For           



                                                  patients 6           



                                                  - 35           



                                                  months of           



                                                  age (0.5           



                                                  mL IM) For           



                                                  3 years of           



                                                  age and           



                                                  older (0.5           



                                                  mL IM)           



                                                  Shake well           



                                                  before use           

 

     Lorazepam            No                       Notes:           [facility]

a



               3-25                               (Same as:           l



               06:05:                               Ativan)           [place]



                                                               

 

     Methocarbam            No                       Notes:           [facility]

[facility]



     ol        3-25                               (Same           l



               06:05:                               as:Robaxin           [facility]                                 )              

 

     Lorazepam            No                       Notes:           [facility]

a



               3-25                               (Same as:           l



               06:05:                               Ativan)           [facility]



                                                               

 

     Methocarbam            No                       Notes:           [facility]

ra[facility]



     ol        3-25                               (Same           l



               06:05:                               as:Robaxin           [place]                                 )              

 

     Lorazepam            No                       Notes:           [facility]

a



               3-25                               (Same as:           l



               06:05:                               Ativan)           [place]



                                                               

 

     Methocarbam            No                       Notes:           [facility]

[facility]



     ol        3-25                               (Same           l



               06:05:                               as:Robaxin           [facility]                                 )              

 

     Sodium            No                       1,000 mL,           [facility]

a



     Chloride      3-25                               Rate: 50           l



     0.9% IV      06:03:                               ml/hr,           [facility]



     1,000 mL      00                                 Infuse           



                                                  over: 20           



                                                  hr, Route:           



                                                  IV, Dosing           



                                                  Weight           



                                                  131.818           



                                                  kg, Total           



                                                  Volume:           



                                                  1,000,           



                                                  Start           



                                                  date:           



                                                  22           



                                                  1:03:00           



                                                  CDT,           



                                                  Duration:           



                                                  30 day,           



                                                  Stop date:           



                                                  22           



                                                  1:02:00           



                                                  CDT, BSA:           



                                                  2.4 m2, 0           

 

     Labetalol            No                       10 mg, 2           [facility]

[facility]



               3-25                               mL, Route:           l



               06:03:                               IVP, Drug                                            form: INJ,           



                                                  Q15Min,           



                                                  Dosing           



                                                  Weight           



                                                  131.818,           



                                                  kg, Start           



                                                  date:           



                                                  22           



                                                  1:03:00           



                                                  CDT,           



                                                  Duration:           



                                                  3 doses or           



                                                  times,           



                                                  Stop date:           



                                                  22           



                                                  1:33:00           



                                                  CDT, 0           

 

     Hydralazine            No                       Notes:           [facility]

[facility]



               3-25                               (Same as:           l



               06:03:                               Apresoline                                            ) Push           



                                                  over 5           



                                                  minutes           

 

     Ondansetron            No                       /= 4           [facility]

a



               3-25                               years,           l



               06:03:                               Pediatric           [place]



                                                Dosing           

 

     Acetaminoph            No                       Notes: Do           [facility]

[facility]



     en 325 MG /      3-25                               not exceed           l



     Hydrocodone      06:03:                               4gm/day of           





     Bitartrate      00                                 acetaminop           



     10 MG Oral                                         hen. (Same           



     Tablet                                         as: Norco           



     [Norco                                         325/10)           



     10/325]                                                        

 

     Dilaudid            No                       Notes:           [facility]



               3-25                               Same as           l



               06:03:                               Dilaudid                                                           

 

     Benadryl            No                       Notes:           [facility]



               3-25                               (Same as:           l



               06:03:                               Benadryl)           [facility]



                                                               

 

     phenol            No                       Notes:           [facility]



               3-25                               Chlorasept           l



               06:03:                               ic Spray                                            (Same as:           



                                                  Chlorasept           



                                                  ic, Sore           



                                                  Throat           



                                                  Spray)           



                                                  WASTE: F/P           



                                                  - Black; E           



                                                  -              



                                                  Municipal           



                                                  Trash Bin           

 

     Bisacodyl            No                       Notes:           [facility]

a



               3-25                               (Same As:           l



               06:03:                               Dulcolax,           [place]                                 [employer]isco-Lax)           

 

     Robaxin            No                       Notes:           [facility]



               3-25                               (Same           l



               06:03:                               as:Robaxin                                            )              

 

     Melatonin 3            No                       Notes:           [facility]

[facility]



     MG Extended      3-25                               (Same as:           l



     Release      06:03:                               Melatonin)           [facility]

jorge



     Tablet      00                                                

 

     Tylenol            No                       Notes: Do           [facility]

ia



               3-25                               not exceed           l



               06:03:                               4 gm/day.           [facility]



                                                (Same as:           



                                                  Tylenol)           

 

     Reglan            No                       Notes:           [facility]



               3-25                               (Same as:           l



               06:03:                               Reglan)           [place]                                                

 

     Phenergan            No                       Notes: Do           [facility]

[facility]



               3-25                               not give           l



               06:03:                               IV push.                                            (Same as:           



                                                  Phenergan)           

 

     Saline            No                       Notes:           [facility]



     Flush 0.9%      3-25                               preservati           l



               06:03:                               ve free.                                                           

 

     Sodium            No                       1,000 mL,           [facility]

a



     Chloride      3-25                               Rate: 50           l



     0.9% IV      06:03:                               ml/hr,           [facility]



     1,000 mL      00                                 Infuse           



                                                  over: 20           



                                                  hr, Route:           



                                                  IV, Dosing           



                                                  Weight           



                                                  131.818           



                                                  kg, Total           



                                                  Volume:           



                                                  1,000,           



                                                  Start           



                                                  date:           



                                                  22           



                                                  1:03:00           



                                                  CDT,           



                                                  Duration:           



                                                  30 day,           



                                                  Stop date:           



                                                  22           



                                                  1:02:00           



                                                  CDT, BSA:           



                                                  2.4 m2, 0           

 

     Labetalol            No                       10 mg, 2           [facility]

[facility]



               3-25                               mL, Route:           l



               06:03:                               IVP, Drug                                            form: INJ,           



                                                  Q15Min,           



                                                  Dosing           



                                                  Weight           



                                                  131.818,           



                                                  kg, Start           



                                                  date:           



                                                  22           



                                                  1:03:00           



                                                  CDT,           



                                                  Duration:           



                                                  3 doses or           



                                                  times,           



                                                  Stop date:           



                                                  22           



                                                  1:33:00           



                                                  CDT, 0           

 

     Hydralazine            No                       Notes:           [facility]

[facility]



               3-25                               (Same as:           l



               06:03:                               Apresoline                                            ) Push           



                                                  over 5           



                                                  minutes           

 

     Ondansetron            No                       /= 4           [facility]

a



               3-25                               years,           l



               06:03:                               Pediatric                                            Dosing           

 

     Acetaminoph            No                       Notes: Do           [facility]

[facility]



     en 325 MG /      3-25                               not exceed           l



     Hydrocodone      06:03:                               4gm/day of           

[place]



     Bitartrate      00                                 acetaminop           



     10 MG Oral                                         hen. (Same           



     Tablet                                         as: Norco           



     [Norco                                         325/10)           



     10/325]                                                        

 

     Dilaudid            No                       Notes:           [facility]



               3-25                               Same as           l



               06:03:                               Dilaudid                                                           

 

     Benadryl            No                       Notes:           [facility]



               3-25                               (Same as:           l



               06:03:                               Benadryl)           



               00                                                

 

     phenol            No                       Notes:           [facility]



               3-25                               Chlorasept           l



               06:03:                               ic Spray                                            (Same as:           



                                                  Chlorasept           



                                                  ic, Sore           



                                                  Throat           



                                                  Spray)           



                                                  WASTE: F/P           



                                                  - Black; E           



                                                  -              



                                                  Municipal           



                                                  Trash Bin           

 

     Bisacodyl            No                       Notes:           [facility]

a



               3-25                               (Same As:           l



               06:03:                               Dulcolax,           [place]                                 Bisco-Lax)           

 

     Robaxin            No                       Notes:           [facility]



               3-25                               (Same           l



               06:03:                               as:Robaxin                                            )              

 

     Melatonin 3            No                       Notes:           [facility]

rajeev



     MG Extended      3-25                               (Same as:           l



     Release      06:03:                               Melatonin)           [facility]

jorge



     Tablet      00                                                

 

     Tylenol            No                       Notes: Do           [facility]

ia



               3-25                               not exceed           l



               06:03:                               4 gm/day.           [place]



               00                                 (Same as:           



                                                  Tylenol)           

 

     Reglan            No                       Notes:           [facility]



               3-25                               (Same as:           l



               06:03:                               Reglan)           [place]



               00                                                

 

     Phenergan            No                       Notes: Do           [facility]

oria



               3-25                               not give           l



               06:03:                               IV push.           [facility]



               00                                 (Same as:           



                                                  Phenergan)           

 

     Saline            No                       Notes:           [facility]



     Flush 0.9%      3-25                               preservati           l



               06:03:                               ve free.           [place]



               00                                                

 

     Sodium            No                       1,000 mL,           [facility]

a



     Chloride      3-25                               Rate: 50           l



     0.9% IV      06:03:                               ml/hr,           [place]



     1,000 mL      00                                 Infuse           



                                                  over: 20           



                                                  hr, Route:           



                                                  IV, Dosing           



                                                  Weight           



                                                  131.818           



                                                  kg, Total           



                                                  Volume:           



                                                  1,000,           



                                                  Start           



                                                  date:           



                                                  22           



                                                  1:03:00           



                                                  CDT,           



                                                  Duration:           



                                                  30 day,           



                                                  Stop date:           



                                                  22           



                                                  1:02:00           



                                                  CDT, BSA:           



                                                  2.4 m2, 0           

 

     Labetalol            No                       10 mg, 2           [facility]

raje[facility]



               3-25                               mL, Route:           l



               06:03:                               IVP, Drug                                            form: INJ,           



                                                  Q15Min,           



                                                  Dosing           



                                                  Weight           



                                                  131.818,           



                                                  kg, Start           



                                                  date:           



                                                  22           



                                                  1:03:00           



                                                  CDT,           



                                                  Duration:           



                                                  3 doses or           



                                                  times,           



                                                  Stop date:           



                                                  22           



                                                  1:33:00           



                                                  CDT, 0           

 

     Hydralazine            No                       Notes:           [facility]

rajeev



               3-25                               (Same as:           l



               06:03:                               Apresoline                                            ) Push           



                                                  over 5           



                                                  minutes           

 

     Ondansetron            No                       /= 4           [facility]

a



               3-25                               years,           l



               06:03:                               Pediatric           [facility]



               00                                 Dosing           

 

     Acetaminoph            No                       Notes: Do           M

[facility]



     en 325 MG /      3-25                               not exceed           l



     Hydrocodone      06:03:                               4gm/day of           

[place]



     Bitartrate      00                                 acetaminop           



     10 MG Oral                                         hen. (Same           



     Tablet                                         as: Norco           



     [Norco                                         325/10)           



     10/325]                                                        

 

     Dilaudid            No                       Notes:           [facility]



               3-25                               Same as           l



               06:03:                               Dilaudid                                                           

 

     Benadryl            No                       Notes:           [facility]



               3-25                               (Same as:           l



               06:03:                               Benadryl)           [place]



                                                               

 

     phenol            No                       Notes:           [facility]



               3-25                               Chlorasept           l



               06:03:                               ic Spray                                            (Same as:           



                                                  Chlorasept           



                                                  ic, Sore           



                                                  Throat           



                                                  Spray)           



                                                  WASTE: F/P           



                                                  - Black; E           



                                                  -              



                                                  Municipal           



                                                  Trash Bin           

 

     Bisacodyl            No                       Notes:           [facility]

a



               3-25                               (Same As:           l



               06:03:                               Dulcolax,                                            Bisco-Lax)           

 

     Robaxin            No                       Notes:           [facility]



               3-25                               (Same           l



               06:03:                               as:Robaxin                                            )              

 

     Melatonin 3            No                       Notes:           [facility]

rajeev



     MG Extended      3-25                               (Same as:           l



     Release      06:03:                               Melatonin)           [facility]

jorge



     Tablet      00                                                

 

     Tylenol            No                       Notes: Do           [facility]

ia



               3-25                               not exceed           l



               06:03:                               4 gm/day.           [place]



                                                (Same as:           



                                                  Tylenol)           

 

     Reglan            No                       Notes:           [facility]



               3-25                               (Same as:           l



               06:03:                               Reglan)                                                           

 

     Phenergan            No                       Notes: Do           Mem

oria



               3-25                               not give           l



               06:03:                               IV push.                                            (Same as:           



                                                  Phenergan)           

 

     Saline            No                       Notes:           [facility]



     Flush 0.9%      3-25                               preservati           l



               06:03:                               ve free.                                                           

 

     butalbital-      2022- No             2{tbl}      2 tablet,         

  [facility]



     acetaminoph      3-18 03-18                          Oral,           [facility] of



     en-caff      03:45: 03:10                          ONCE, 1           [place]



     (ESGIC)      00   :00                           dose, On           Medical



     -40                                         Thu            [facility]



     mg tablet 2                                         3/17/22 at           



     tablet                                         2245, ASAP           

 

     NaCl 0.9%      2022- No             500mL      at 999           [facility]

ers



     (NS) bolus      3-18 03-18                          mL/hr, 500           it

y of



     infusion      02:30: 03:17                          mL, IV           [place]



     500 mL      00   :00                           Infusion,           Medical



                                                  ONCE, 1           [facility]



                                                  dose, On           



                                                  Thu            



                                                  3/17/22 at           



                                                  2130, STAT           

 

     diphenhydrA      2022- No             25mg      25 mg,           [facility]

vers



     MINE      3-18 03-18                          Slow IV           [facility] of



     (BENADRYL)      02:30: 01:46                          Push,           [place]



     injection      00   :00                           ONCE, 1           [facility]



     25 mg                                         dose, On           [facility]



                                                  Thu            



                                                  3/17/22 at           



                                                  2130, STAT           

 

     magnesium      2022- No             2g        2 g, IV           [facility]

ers



     sulfate in      3-18 03-18                          Piggyback,           it

y of



     water 2      02:15: 03:17                          Administer           [facility]

as



     gram/50 mL      00   :00                           over 30           [facility]

l



     (4 %)                                         Minutes,           [facility]



     infusion 2                                         ONCE, 1           



     g                                            dose, On           



                                                  u            



                                                  3/17/22 at           



                                                  2115,           



                                                  Routine           

 

     HYDROmorpho      2022- No             .5mg      0.5 mg,           Un

[facility]



     ne                                  Slow IV           ity of



     (DILAUDID)      01:30: 01:25                          Push,           [place]



     injection      00   :00                           ONCE, 1           Medical



     0.5 mg                                         dose, On           [facility]



                                                  Tue            



                                                  22 at           



                                                  1930,           



                                                  Routine<br           



                                                  >Use           



                                                  approved           



                                                  by             



                                                  (Faculty):           



                                                  ADC            



                                                  PROVIDER           

 

     levoFLOXaci      2022- No        [id number] 750mg      Take 1        

   [facility]



     n 750 mg                                tablet by           ity o

f



     tablet      00:00: 05:59                          mouth           [place]



               00   :00                           daily for           Medical



                                                  4 days.           [facility]

 

     levoFLOXaci      2022- No        [id number] 750mg      Take 1        

   [facility]



     n 750 mg                                tablet by           ity o

f



     tablet      00:00: 05:59                          mouth           [place]



               00   :00                           daily for           Medical



                                                  4 days.           [facility]

 

     OXYCODONE            Yes                      Take by           [facility]

rs



     HCL/ACETAMI      1-25                               mouth.           ity of



     NOPHEN      19:49:                                              [place]



     (PERCOCET      27                                                Medical



     ORAL)                                                        [facility]

 

     OXYCODONE            Yes                      Take by           [facility]

rs



     HCL/ACETAMI      1-25                               mouth.           ity of



     NOPHEN      19:49:                                              [place]



     (PERCOCET      27                                                Medical



     ORAL)                                                        Branch

 

     OXYCODONE            Yes                      Take by           Unive

rs



     HCL/ACETAMI      1-25                               mouth.           ity of



     NOPHEN      19:49:                                              [place]



     (PERCOCET      27                                                Medical



     ORAL)                                                        Branch

 

     OXYCODONE            Yes                      Take by           [facility]

rs



     HCL/ACETAMI      25                               mouth.           ity of



     NOPHEN      19:49:                                              [place]



     (PERCOCET      27                                                Medical



     ORAL)                                                        Branch

 

     vancomycin      2022- No             125mg      125 mg,           Un

[facility]



     (VANCOCIN)                                Oral, QID,           it

y of



     capsule 125      18:00: 21:59                          25 doses,           

[place]



     mg        00   :00                           First dose           Medical



                                                  (after           [facility]



                                                  last           



                                                  modificati           



                                                  on) on 22 at           



                                                  1200, Last           



                                                  dose on           



                                                  22 at           



                                                  1200,           



                                                  ASAP<br>Fa           



                                                  culty           



                                                  member           



                                                  approving           



                                                  Non-formul           



                                                  maryanne            



                                                  medication           



                                                  :              



                                                  MEGADC<br&           



                                                  gt;Reason           



                                                  for            



                                                  non-formul           



                                                  maryanne use:           



                                                  SPECIFIC           



                                                  INDICATION           



                                                  FOR            



                                                  NONFORMULA           



                                                  RY             



                                                  PRODUCT<br           



                                                  >Reason           



                                                  for            



                                                  Anti-Infec           



                                                  tive:           



                                                  Documented           



                                                  Infection<           



                                                  br>Documen           



                                                  huma            



                                                  Infection           



                                                  Site:           



                                                  Abdominal<           



                                                  br>Duratio           



                                                  n of           



                                                  Therapy:           



                                                  14 days           

 

     levoFLOXaci            Yes            750mg      750 mg,           Un

[facility]



     n                                        Oral,           ity of



     (LEVAQUIN)      15:00:                               DAILY,           [place]



     tablet 750      00                                 First dose           Med

ical



     mg                                           (after           [facility]



                                                  last           



                                                  modificati           



                                                  on) on 22 at           



                                                  0900,           



                                                  Until           



                                                  Discontinu           



                                                  ed,            



                                                  ASAP<br>Re           



                                                  ason for           



                                                  Anti-Infec           



                                                  tive:           



                                                  Empiric           



                                                  Therapy           



                                                  for            



                                                  Suspected           



                                                  Infection<           



                                                  br>Empiric           



                                                  Therapy           



                                                  Site:           



                                                  Urine<br>D           



                                                  uration of           



                                                  therapy:           



                                                  72 hours           

 

     lactobacill      2022- No        [id number] .5mg      Take 1         

  [facility]



     us        -25                          tablet by           ity of



     acidophilus      00:00: 05:59                          mouth 2           [facility]

[place]



               00   :00                           (two)           Medical



                                                  times           [facility]



                                                  daily for           



                                                  30 days.           

 

     lactobacill      2022- No        [id number] .5mg      Take 1         

  [facility]



     us         02-25                          tablet by           ity of



     acidophilus      00:00: 05:59                          mouth 2           [facility]

[place]



               00   :00                           (two)           Medical



                                                  times           [facility]



                                                  daily for           



                                                  30 days.           

 

     vancomycin      2022- No        [id number] 125mg      Take 1         

  [facility]



     125 mg                                capsule by           ity of



     capsule      00:00: 05:59                          mouth 4           [place]



               00   :00                           (four)           Medical



                                                  times           [facility]



                                                  daily for           



                                                  5 days.           

 

     vancomycin      2022- No        [id number] 125mg      Take 1         

  [facility]



     125 mg                                capsule by           [facility] of



     capsule      00:00: 05:59                          mouth 4           [place]



               00   :00                           (four)           [facility]



                                                  times           [facility]



                                                  daily for           



                                                  5 days.           

 

     traMADoL            Yes            50mg      50 mg,           [facility]



     (ULTRAM)                                     Oral,           ity of



     tablet 50      21:26:                               Q6HPRN,           [place]



     mg        10                                 Starting           Medical



                                                  on Mon           [facility]



                                                  22 at           



                                                  1526,           



                                                  Until           



                                                  Discontinu           



                                                  ed,            



                                                  Routine,           



                                                  Pain           



                                                  (scale           



                                                  4-6)           

 

     levoFLOXaci      2022- No             250mg      250 mg,           U

nivers



     n                                   Oral, Q24H           ity of



     (LEVAQUIN)      19:30: 23:18                          ABX, First           

[place]



     tablet 250      00   :17                           dose           Medical



     mg                                           (after           [facility]



                                                  last           



                                                  modificati           



                                                  on) on 22 at           



                                                  1330,           



                                                  Until           



                                                  Discontinu           



                                                  ed,            



                                                  ASAP<br>Re           



                                                  ason for           



                                                  Anti-Infec           



                                                  tive:           



                                                  Empiric           



                                                  Therapy           



                                                  for            



                                                  Suspected           



                                                  Infection<           



                                                  br>Empiric           



                                                  Therapy           



                                                  Site:           



                                                  Urine<br>D           



                                                  uration of           



                                                  therapy:           



                                                  72 hours           

 

     HYDROmorpho      2022- No             .5mg      0.5 mg,           Un

[facility]



     ne                                  Slow IV           ity of



     (DILAUDID)      20:45: 18:23                          Push,           [place]



     injection      00   :44                           Q6HPRN,           Medical



     0.5 mg                                         Starting           [facility]



                                                  on Sun           



                                                  22 at           



                                                  1445,           



                                                  Until 22 at           



                                                  1223,           



                                                  Routine,           



                                                  Pain           



                                                  (scale           



                                                  7-10),           



                                                  breakthrou           



                                                  gh             



                                                  pain<br>Us           



                                                  e approved           



                                                  by             



                                                  (Faculty):           



                                                  ADC            



                                                  PROVIDER           

 

     oxyCODONE-a            Yes            2{tbl}      2 tablet,          

 [facility]



     cetaminophe                                     Oral,           ity of



     n         20:39:                               Q6HPRN,           [place]



     (PERCOCET)      03                                 Starting           Medic

al



     5-325 mg                                         on Sun           [facility]



     per tablet                                         22 at           



     2 tablet                                         1439,           



                                                  Until           



                                                  Discontinu           



                                                  ed,            



                                                  Routine,           



                                                  Pain           



                                                  (scale           



                                                  7-10)           

 

     HYDROmorpho      2022- No             .5mg      0.5 mg,           Un

[facility]



     ne                                  Slow IV           ity of



     (DILAUDID)      00:00: 23:41                          Push,           [place]



     injection      00   :00                           ONCE, 1           Medical



     0.5 mg                                         dose, On           [facility]



                                                  Sat            



                                                  22 at           



                                                  1800,           



                                                  Routine<br           



                                                  >Use           



                                                  approved           



                                                  by             



                                                  (Faculty):           



                                                  ADC            



                                                  PROVIDER           

 

     ertapenem       No             1000mg      1,000 mg,           

[facility]



     (INVANZ)                                IV             ity of



     1,000 mg in      15:45: 18:30                          Piggyback,          

 [place]



     NaCl 0.9%      00   :00                           Q24H ABX,           Medic

al



     (NS) 50 mL                                         First dose           Bra

nch



     MINI-BAG                                         on Sat           



                                                  22 at           



                                                  0945,           



                                                  Until           



                                                  Discontinu           



                                                  ed,            



                                                  Administer           



                                                  over 30           



                                                  Minutes,           



                                                  50             



                                                  mL<br>Reas           



                                                  on for           



                                                  Anti-Infec           



                                                  tive:           



                                                  Empiric           



                                                  Therapy           



                                                  for            



                                                  Suspected           



                                                  Infection<           



                                                  br>Empiric           



                                                  Therapy           



                                                  Site:           



                                                  Urine<br>D           



                                                  uration of           



                                                  therapy:           



                                                  72             



                                                  hours<br>R           



                                                  estricted           



                                                  use            



                                                  approved           



                                                  by: [facility]           



                                                  PROVIDER           

 

     lactobacill            Yes            .5mg      0.5 mg,           Uni

vers



     us                                       Oral, BID,           ity of



     acidophilus      02:00:                               First dose           

[place]



     tablet 0.5      00                                 on Fri           Medical



     mg                                           22 at           [facility]



                                                  2000,           



                                                  Until           



                                                  Discontinu           



                                                  ed,            



                                                  Routine           

 

     vancomycin       No             250mg      250 mg,           Un

[facility]



     (VANCOCIN)                                Oral, QID,           it

y of



     capsule 250      02:00: 16:20                          First dose          

 [place]



     mg        00   :40                           (after           Medical



                                                  last           [facility]



                                                  reorder)           



                                                  on Fri           



                                                  22 at           



                                                  2000,           



                                                  Until           



                                                  Discontinu           



                                                  ed,            



                                                  ASAP&lt;br           



                                                  >Faculty           



                                                  member           



                                                  approving           



                                                  Non-formul           



                                                  maryanne            



                                                  medication           



                                                  :              



                                                  MEGADC<br>           



                                                  Reason for           



                                                  non-formul           



                                                  maryanne use:           



                                                  SPECIFIC           



                                                  INDICATION           



                                                  FOR            



                                                  NONFORMULA           



                                                  RY             



                                                  PRODUCT<br           



                                                  >Reason           



                                                  for            



                                                  Anti-Infec           



                                                  tive:           



                                                  Documented           



                                                  Infection<           



                                                  br>Documen           



                                                  huma            



                                                  Infection           



                                                  Site:           



                                                  Abdominal<           



                                                  br>Duratio           



                                                  n of           



                                                  Therapy:           



                                                  14 days           

 

     vancomycin       No             250mg      250 mg,           Un

[facility]



     (VANCOCIN)                                Oral,           ity of



     capsule 250      00:45: 00:34                          ONCE, 1           [facility]

[place]



     mg        00   :00                           dose, On           Medical



                                                  Fri            [facility]



                                                  22 at           



                                                  1845,           



                                                  ASAP<br>Fa           



                                                  culty           



                                                  member           



                                                  approving           



                                                  Non-formul           



                                                  maryanne            



                                                  medication           



                                                  :              



                                                  MEGADC<br>           



                                                  Reason for           



                                                  non-formul           



                                                  maryanne use:           



                                                  SPECIFIC           



                                                  INDICATION           



                                                  FOR            



                                                  NONFORMULA           



                                                  RY             



                                                  PRODUCT<br           



                                                  >Reason           



                                                  for            



                                                  Anti-Infec           



                                                  tive:           



                                                  Documented           



                                                  Infection<           



                                                  br>Documen           



                                                  huma            



                                                  Infection           



                                                  Site:           



                                                  Abdominal<           



                                                  br>Duratio           



                                                  n of           



                                                  Therapy:           



                                                  14 days           

 

     lidocaine      2022- No             5mL       5 mL,           Univer

s



     1% (PF)                                Subcutaneo           ity o

f



     (XYLOCAINE)      23:45: 02:25                          us, ONCE,           

[place]



     injection 5      00   :00                           1 dose, On           Me

dical



     mL                                           Fri            [facility]



                                                  22 at           



                                                  1745,           



                                                  Routine           

 

     NaCl 0.9%            Yes            10mL      10 mL,           Univer

s



     (NS)                                     Slow IV           ity of



     injection      23:38:                               Push, PRN,           [facility]

[place]



     10 mL      41                                 Starting           Medical



                                                  on Fri           [facility]



                                                  22 at           



                                                  1738,           



                                                  Until           



                                                  Discontinu           



                                                  ed,            



                                                  Routine,           



                                                  line           



                                                  maintenanc           



                                                  e              

 

     meropenem      2022- No             1g        1 g, IV           Univ

ers



     (MERREM)                           Piggyback,           it

y of



     g in NaCl      23:15: 14:33                          Q8H ABX,           [facility]

as



     0.9% (NS)      00   :35                           First dose           Medi

[facility]



     100 mL                                         (after           [facility]



     MINI-BAG                                         last           



                                                  modificati           



                                                  on) on Thu           



                                                  22 at           



                                                  1715,           



                                                  Until           



                                                  Discontinu           



                                                  ed,            



                                                  Administer           



                                                  over 60           



                                                  Minutes,           



                                                  100            



                                                  mL<br>Rest           



                                                  ricted use           



                                                  approved           



                                                  by: [facility]           



                                                  PROVIDER<b           



                                                  r&gt;Reaso           



                                                  n for           



                                                  Anti-Infec           



                                                  tive:           



                                                  Documented           



                                                  Infection<           



                                                  br>Documen           



                                                  huma            



                                                  Infection           



                                                  Site:           



                                                  Urine<br>D           



                                                  uration of           



                                                  Therapy: 7           



                                                  days           

 

     enoxaparin            Yes            40mg      40 mg,           Unive

rs



     (LOVENOX)                                     Subcutaneo           ity 

of



     injection      23:00:                               us, DAILY,           [facility]

[place]



     40 mg      00                                 First dose           Medical



                                                  on Thu           [facility]



                                                  22 at           



                                                  1700,           



                                                  Until           



                                                  Discontinu           



                                                  ed,            



                                                  Routine           

 

     meropenem      2022- No             1g        1 g, IV           Univ

ers



     (MERREM)                           Piggyback,           it

y of



     g in NaCl      16:00: 20:33                          Q8H ABX,           [facility]

as



     0.9% (NS)      00   :04                           First dose           Medi

[facility]



     100 mL                                         (after           [facility]



     MINI-BAG                                         last           



                                                  reorder)           



                                                  on u           



                                                  22 at           



                                                  1000,           



                                                  Until           



                                                  Discontinu           



                                                  ed,            



                                                  Administer           



                                                  over 60           



                                                  Minutes,           



                                                  100            



                                                  mL<br>Rest           



                                                  ricted use           



                                                  approved           



                                                  by: [facility]           



                                                  PROVIDER<b           



                                                  r>Reason           



                                                  for            



                                                  Anti-Infec           



                                                  tive:           



                                                  Documented           



                                                  Infection<           



                                                  br>Documen           



                                                  huma            



                                                  Infection           



                                                  Site:           



                                                  Urine<br>D           



                                                  uration of           



                                                  Therapy:           



                                                  Other (see           



                                                  Comments)           

 

     HYDROmorpho      0 202- No             .5mg      0.5 mg,           Un

[facility]



     ne                                  Slow IV           ity of



     (DILAUDID)      14:29: 20:41                          Push,           [place]



     injection      58   :17                           Q4HPRN,           Medical



     0.5 mg                                         Starting           [facility]



                                                  on u           



                                                  22 at           



                                                  0829,           



                                                  Until Sun           



                                                  22 at           



                                                  1441,           



                                                  Routine,           



                                                  Pain           



                                                  (scale           



                                                  7-10)<br>U           



                                                  se             



                                                  approved           



                                                  by             



                                                  (Faculty):           



                                                  ADC            



                                                  PROVIDER           

 

     proMETHazin            Yes            25mg      25 mg,           Univ

ers



     e         120                               Oral,           ity of



     (PHENERGAN)      09:42:                               Q4HPRN,           [facility]

as



     tablet 25      07                                 Starting           [facility]

l



     mg                                           on Thu           [facility]



                                                  22 at           



                                                  0342,           



                                                  Until           



                                                  Discontinu           



                                                  ed,            



                                                  Routine,           



                                                  Nausea and           



                                                  Vomiting           



                                                  (N/V)           

 

     HYDROmorpho       202- No             .5mg      0.5 mg,           Un

[facility]



     ne                                  Slow IV           ity of



     (DILAUDID)      09:41: 12:04                          Push,           [place]



     injection      36   :38                           Q4HPRN,           Medical



     0.5 mg                                         Starting           [facility]



                                                  on u           



                                                  22 at           



                                                  0341,           



                                                  Until Thu           



                                                  22 at           



                                                  0604,           



                                                  Routine,           



                                                  Pain           



                                                  (scale           



                                                  7-10)<br>U           



                                                  se             



                                                  approved           



                                                  by             



                                                  (Faculty):           



                                                  ADC            



                                                  PROVIDER           

 

     proCHLORper            Yes            10mg      10 mg,           Univ

ers



     azine      1-20                               Slow IV           ity of



     (COMPAZINE)      09:07:                               Push,           [place]



     injection      27                                 Q6HPRN,           Medical



     10 mg                                         Starting           [facility]



                                                  on u           



                                                  22 at           



                                                  0307,           



                                                  Until           



                                                  Discontinu           



                                                  ed,            



                                                  Routine,           



                                                  Nausea and           



                                                  Vomiting           



                                                  (N/V)           

 

     acetaminoph            Yes            650mg      650 mg,           Un

[facility]



     en        20                               Oral,           ity of



     (TYLENOL)      09:07:                               Q6HPRN,           [place]



     tablet 650      10                                 Starting           Medic

al



     mg                                           on Thu           [facility]



                                                  22 at           



                                                  0307,           



                                                  Until           



                                                  Discontinu           



                                                  ed,            



                                                  Routine,           



                                                  Pain           



                                                  (scale           



                                                  1-3)           

 

     meropenem      2022- No             1g        1 g, IV           Univ

ers



     (MERREM) 1                                Piggyback,           it

y of



     g in NaCl      07:15: 08:49                          ONCE, 1           [place]

[facility]



     0.9% (NS)      00   :00                           dose, On           [facility]

l



     100 mL                                         u            [facility]



     MINI-BAG                                         22 at           



                                                  0115,           



                                                  Administer           



                                                  over 60           



                                                  Minutes,           



                                                  100            



                                                  mL<br>Rest           



                                                  ricted use           



                                                  approved           



                                                  by: [facility]           



                                                  PROVIDER<b           



                                                  r>Reason           



                                                  for            



                                                  Anti-Infec           



                                                  tive:           



                                                  Documented           



                                                  Infection<           



                                                  br>Documen           



                                                  huma            



                                                  Infection           



                                                  Site:           



                                                  Urine<br>D           



                                                  uration of           



                                                  Therapy:           



                                                  Other (see           



                                                  Comments)           

 

     cefdinir      2022- No             300mg      300 mg,           Univ

ers



     (OMNICEF)                                Oral,           ity of



     capsule 300      03:30: 02:55                          ONCE, 1           [facility]

[facility]



     mg        00   :00                           dose, On           Medical



                                                  Mon            [facility]



                                                  22 at           



                                                  2130,           



                                                  ASAP<br>Re           



                                                  ason for           



                                                  Anti-Infec           



                                                  tive:           



                                                  Documented           



                                                  Infection<           



                                                  br>Documen           



                                                  huma            



                                                  Infection           



                                                  Site:           



                                                  Urine<br>D           



                                                  uration of           



                                                  Therapy: 7           



                                                  days           

 

     FENTanyl PF      2022- No             50ug      50 mcg,           Un

[facility]



     (SUBLIMAZE                                Slow IV           ity o

f



     (PF))      01:15: 03:26                          Push,           [place]



     injection      00   :00                           ONCE, 1           Medical



     50 mcg                                         dose, On           [facility]



                                                  Mon            



                                                  22 at           



                                                  1915, STAT           

 

     proMETHazin      2022- No             25mg      25 mg, IV           

[facility]



     e                                   Piggyback,           ity of



     (PHENERGAN)      01:15: 03:26                          ONCE, 1           [facility]

[facility]



     25 mg in      00   :00                           dose, On           Medical



     NaCl 0.9%                                         Mon            [facility]



     (NS) 50 mL                                         22 at           



     piggyback                                         1915, 50           



                                                  mL             

 

     cefdinir      2022- No        [id number] 300mg      Take 1           U

n[facility]



     300 mg                                capsule by           ity of



     capsule      00:00: 05:59                          mouth           [place]



               00   :00                           every 12           Medical



                                                  (twelve)           [facility]



                                                  hours for           



                                                  10 days.           

 

     cefdinir      2022- No        [id number] 300mg      Take 1           U

nivers



     300 mg                                capsule by           ity of



     capsule      00:00: 00:00                          mouth           [place]



               00   :00                           every 12           Medical



                                                  (twelve)           [facility]



                                                  hours for           



                                                  10 days.           

 

     FENTanyl PF      2022- No             50ug      50 mcg,           Un

[facility]



     (SUBLIMAZE                                Slow IV           ity o

f



     (PF))      02:00: 01:19                          Push,           [place]



     injection      00   :00                           ONCE, 1           Medical



     50 mcg                                         dose, On           [facility]



                                                  Sat            



                                                  1/15/22 at           



                                                  2000,           



                                                  Routine           

 

     methocarbam      2022- No             1000mg      1,000 mg,         

  [facility]



     oL                                  Oral,           ity of



     (ROBAXIN)      02:00: 01:19                          ONCE, 1           [place]

[facility]



     tablet      00   :00                           dose, On           Medical



     1,000 mg                                         Sat            [facility]



                                                  1/15/22 at           



                                                  2000, ASAP           

 

     proMETHazin      2022- No             12.5mg      12.5 mg,          

 [facility]



     e         1-15 01-15                          IV             ity of



     (PHENERGAN)      22:46: 23:00                          Piggyback,          

 [place]



     12.5 mg in      00   :00                           ONCE, 1           [facility]

l



     NaCl 0.9%                                         dose, On           [facility]



     (NS) 50 mL                                         Sat            



     piggyback                                         1/15/22 at           



                                                  1700, 50           



                                                  mL             

 

     FENTanyl PF      2022- No             50ug      50 mcg,           Un

[facility]



     (SUBLIMAZE      1-15 01-15                          Slow IV           ity o

f



     (PF))      22:45: 23:00                          Push,           [place]



     injection      00   :00                           ONCE, 1           Medical



     50 mcg                                         dose, On           [facility]



                                                  Sat            



                                                  1/15/22 at           



                                                  1645,           



                                                  Routine           

 

     methocarbam      -0      Yes       [id number] 1000mg      Take 2         

  [facility]



     oL 500 mg      1-15                               tablets by           ity 

of



     tablet      00:00:                               mouth 4           [place]



               00                                 (four)           Medical



                                                  times           [facility]



                                                  daily as           



                                                  needed for           



                                                  Pain           



                                                  (scale           



                                                  1-3).           

 

     methocarbam      -0      Yes       [id number] 1000mg      Take 2         

  [facility]



     oL 500 mg      1-15                               tablets by           ity 

of



     tablet      00:00:                               mouth 4           [place]



               00                                 (four)           Medical



                                                  times           [facility]



                                                  daily as           



                                                  needed for           



                                                  Pain           



                                                  (scale           



                                                  1-3).           

 

     methocarbam      -0      Yes       [id number] 1000mg      Take 2         

  [facility]



     oL 500 mg      1-15                               tablets by           ity 

of



     tablet      00:00:                               mouth 4           [place]



               00                                 (four)           Medical



                                                  times           [facility]



                                                  daily as           



                                                  needed for           



                                                  Pain           



                                                  (scale           



                                                  1-3).           

 

     methocarbam            Yes       [id number] 1000mg      Take 2         

  [facility]



     oL 500 mg      1-15                               tablets by           ity 

of



     tablet      00:00:                               mouth 4           [place]



               00                                 (four)           Medical



                                                  times           [facility]



                                                  daily as           



                                                  needed for           



                                                  Pain           



                                                  (scale           



                                                  1-3).           

 

     methocarbam            Yes       [id number] 1000mg      Take 2         

  [facility]



     oL 500 mg      1-15                               tablets by           ity 

of



     tablet      00:00:                               mouth 4           [place]



               00                                 (four)           Medical



                                                  times           [facility]



                                                  daily as           



                                                  needed for           



                                                  Pain           



                                                  (scale           



                                                  1-3).           

 

     methocarbam            Yes       [id number] 1000mg      Take 2         

  [facility]



     oL 500 mg      1-15                               tablets by           ity 

of



     tablet      00:00:                               mouth 4           [place]



               00                                 (four)           Medical



                                                  times           [facility]



                                                  daily as           



                                                  needed for           



                                                  Pain           



                                                  (scale           



                                                  1-3).           

 

     methocarbam      2022- No        [id number] 1000mg      Take 2        

   [facility]



     oL 500 mg      1-15 05-11                          tablets by           ity

 of



     tablet      00:00: 00:00                          mouth 4           [place]



               00   :00                           (four)           Medical



                                                  times           [facility]



                                                  daily as           



                                                  needed for           



                                                  Pain           



                                                  (scale           



                                                  1-3).           

 

     proMETHazin      2021 No             25mg      25 mg, IV           

[facility]



     e         -                          Piggyback,           ity of



     (PHENERGAN)      23:15: 22:20                          ONCE, 1           [facility]

[facility]



     25 mg in      00   :00                           dose, On           Medical



     NaCl 0.9%                                         Wed            [facility]



     (NS) 50 mL                                         21           



     piggyback                                         at 1715,           



                                                  50 mL           

 

     FENTanyl PF      2021- No             75ug      75 mcg,           Un

[facility]



     (SUBLIMAZE                                Slow IV           ity o

f



     (PF))      22:15: 22:20                          Push,           [place]



     injection      00   :00                           ONCE, 1           Medical



     75 mcg                                         dose, On           [facility]



                                                  Wed            



                                                  21           



                                                  at 1615,           



                                                  Routine           

 

     fidaxomicin      2021      Yes       [id number] 200mg      Take 1          

 [facility]



     200 mg      1-24                               tablet by           ity of



     tablet      00:00:                               mouth 2           [place]



               00                                 (two)           Medical



                                                  times           [facility]



                                                  daily.           

 

     proMETHazin      2021      Yes       [id number] 25mg      Take 1           

[facility]



     e 25 mg      1-24                               tablet by           ity of



     tablet      00:00:                               mouth           [place]



               00                                 every 6           Medical



                                                  (six)           [facility]



                                                  hours as           



                                                  needed for           



                                                  Nausea and           



                                                  Vomiting           



                                                  (N/V).           

 

     fidaxomicin      2021      Yes       [id number] 200mg      Take 1          

 [facility]



     200 mg      1-24                               tablet by           ity of



     tablet      00:00:                               mouth 2           [place]



               00                                 (two)           Medical



                                                  times           [facility]



                                                  daily.           

 

     proMETHazin      2021      Yes       [id number] 25mg      Take 1           

[facility]



     e 25 mg      1-24                               tablet by           ity of



     tablet      00:00:                               mouth           [place]



               00                                 every 6           Medical



                                                  (six)           Branch



                                                  hours as           



                                                  needed for           



                                                  Nausea and           



                                                  Vomiting           



                                                  (N/V).           

 

     cholestyram      2021      Yes                                     Univer

s



     ine 4 gram      1-24                                              ity of



     packet      00:00:                                              [place]



               00                                                Medical



                                                                 Branch

 

     fidaxomicin      2021      Yes       [id number] 200mg      Take 1          

 [facility]



     200 mg      1-24                               tablet by           ity of



     tablet      00:00:                               mouth 2           [place]



               00                                 (two)           Medical



                                                  times           Branch



                                                  daily.           

 

     proMETHazin      2021      Yes       [id number] 25mg      Take 1           

[facility]



     e 25 mg      1-24                               tablet by           ity of



     tablet      00:00:                               mouth           [place]



               00                                 every 6           Medical



                                                  (six)           Branch



                                                  hours as           



                                                  needed for           



                                                  Nausea and           



                                                  Vomiting           



                                                  (N/V).           

 

     cholestyram      2021      Yes                                     Univer

s



     ine 4 gram      1-24                                              ity of



     packet      00:00:                                              [place]



               00                                                Medical



                                                                 Branch

 

     cholestyram      2021      Yes                                     Univer

s



     ine 4 gram      1-24                                              ity of



     packet      00:00:                                              [place]



               00                                                Medical



                                                                 [facility]anch

 

     cholestyram      2021      Yes                                     Univer

s



     ine 4 gram      1-24                                              ity of



     packet      00:00:                                              [place]



                                                               Medical



                                                                 Branch

 

     cholestyram      2021      Yes                                     Univer

s



     ine 4 gram      1-24                                              ity of



     packet      00:00:                                              [place]



               00                                                Medical



                                                                 Branch

 

     cholestyram      2021      Yes                                     Univer

s



     ine 4 gram      1-24                                              ity of



     packet      00:00:                                              [place]



               00                                                Medical



                                                                 Branch

 

     fidaxomicin      2021      Yes       [id number] 200mg      Take 1          

 [facility]



     200 mg      1-24                               tablet by           ity of



     tablet      00:00:                               mouth 2           [place]



               00                                 (two)           Medical



                                                  times           Branch



                                                  daily.           

 

     proMETHazin      2021      Yes       [id number] 25mg      Take 1           

[facility]



     e 25 mg      1-24                               tablet by           ity of



     tablet      00:00:                               mouth           [place]



               00                                 every 6           Medical



                                                  (six)           Branch



                                                  hours as           



                                                  needed for           



                                                  Nausea and           



                                                  Vomiting           



                                                  (N/V).           

 

     cholestyram      2021 No                                      Unive

rs



     ine 4 gram       05-11                                         ity of



     packet      00:00: 00:00                                         [place]



               00   :00                                          Medical



                                                                 Branch

 

     fidaxomicin      2021- No        [id number] 200mg      Take 1         

  [facility]



     200 mg                                tablet by           ity of



     tablet      00:00: 00:00                          mouth 2           [place]



               00   :00                           (two)           Medical



                                                  times           Branch



                                                  daily.           

 

     proMETHazin      2021- No        [id number] 25mg      Take 1          

 [facility]



     e 25 mg                                tablet by           ity of



     tablet      00:00: 00:00                          mouth           [place]



               00   :00                           every 6           Medical



                                                  (six)           Branch



                                                  hours as           



                                                  needed for           



                                                  Nausea and           



                                                  Vomiting           



                                                  (N/V).           

 

     FENTanyl PF       No             50ug      50 mcg,           Un

[facility]



     (SUBLIMAZE      5-10 05-10                          Intravenou           it

y of



     (PF))      02:45: 01:34                          s, ONCE, 1           [place]



     injection      00   :00                           dose, [facility]           Medic

al



     50 mcg                                         21 at           Branch



                                                  2145,           



                                                  Routine           

 

     cefTRIAXone       No             1000mg      1,000 mg,         

  [facility]



     (ROCEPHIN)      5-10 05-10                          IV             ity of



     1,000 mg in      02:30: 01:55                          Piggyback,          

 [place]



     NaCl 0.9%      00   :00                           ONCE, 1           Medical



     (NS) 50 mL                                         dose, [facility]

ch



     MINI-BAG                                         21 at           



                                                  2130, 50           



                                                  mL<br>Reas           



                                                  on for           



                                                  Anti-Infec           



                                                  tive:           



                                                  Documented           



                                                  Infection<           



                                                  br>Documen           



                                                  huma            



                                                  Infection           



                                                  Site:           



                                                  Urine<br>D           



                                                  uration of           



                                                  Therapy: 7           



                                                  days           

 

     famotidine       No             20mg      20 mg,           Univ

ers



     (PEPCID      5-10 05-10                          Slow IV           ity of



     (PF))      01:00: 00:12                          Push,           [place]



     injection      00   :00                           ONCE, 1           Medical



     20 mg                                         dose, [facility]



                                                  21 at           



                                                  2000, ASAP           

 

     proMETHazin       No             25mg      25 mg, IV           

Univers



     e         5-10 05-10                          Piggyback,           ity of



     (PHENERGAN)      00:45: 00:11                          ONCE, 1           [facility]

[place]



     25 mg in      00   :00                           dose, [place]           Medica

l



     NaCl 0.9%                                         21 at           [facility]

h



     (NS) 50 mL                                         1945, 50           



     piggyback                                         mL             

 

     FENTanyl PF       No             50ug      50 mcg,           Un

[facility]



     (SUBLIMAZE      5-10 05-10                          Intravenou           it

y of



     (PF))      00:45: 00:12                          s, ONCE, 1           [place]



     injection      00   :00                           dose, [place]           Medic

al



     50 mcg                                         21 at           Branch



                                                  1945,           



                                                  Routine           

 

     NaCl 0.9%       No             1000mL      at 999           Uni

vers



     (NS) bolus      5-10 05-10                          mL/hr,           ity of



     infusion      00:00: 01:47                          1,000 mL,           Eric

as



     1,000 mL      00   :00                           IV             Medical



                                                  Infusion,           [place]



                                                  ONCE, 1           



                                                  dose, [place]           



                                                  21 at           



                                                  1900, ASAP           

 

     cefdinir      2021- No        [id number] 300mg      Take 1           U

nivers



     300 mg       05-20                          capsule by           ity of



     capsule      00:00: 04:59                          mouth 2           [place]



               00   :00                           (two)           Medical



                                                  times           [place]



                                                  daily for           



                                                  10 days.           

 

     buPROPion       No             150mg QD   Take 1           [facility]



     (WELLBUTRIN      1-17 01-16                          tablet           [facility]



     XL) 150 MG      00:00: 23:59                          (150 mg           Med

ical



     24 hr      00   :00                           total) by           Center



     tablet                                         mouth           



                                                  daily.           

 

     citalopram       No             40mg QD   Take 1           [facility]



     (CELEXA) 40      1-17 01-16                          tablet (40           [facility]

ukes



     MG tablet      00:00: 23:59                          mg total)           Me

dical



               00   :00                           by mouth           Center



                                                  daily.           

 

     oxyCODONE-a            Yes            1{tbl}      Take 1           [facility]



     cetaminophe      1-16                               tablet by           In

es



     n         14:44:                               mouth           Medical



     (PERCOCET)      50                                 every 4           Center



      mg                                         (four)           



     per tablet                                         hours as           



                                                  needed for           



                                                  Pain.           

 

     oxyCODONE-a            Yes            1{tbl}      Take 1           [facility]



     cetaminophe      1-16                               tablet by           Ni

es



     n         14:44:                               mouth           Medical



     (PERCOCET)      50                                 every 4           Center



      mg                                         (four)           



     per tablet                                         hours as           



                                                  needed for           



                                                  Pain.           

 

     oxyCODONE-a            Yes            1{tbl}      Take 1           [facility]



     cetaminophe      1-16                               tablet by           Ni

es



     n         14:44:                               mouth           Medical



     (PERCOCET)      50                                 every 4           Center



      mg                                         (four)           



     per tablet                                         hours as           



                                                  needed for           



                                                  Pain.           

 

     oxyCODONE-a      2020-0      Yes            1{tbl}      Take 1           [facility]



     cetaminophe      1-16                               tablet by           Ni

es



     n         14:44:                               mouth           Medical



     (PERCOCET)      50                                 every 4           Center



      mg                                         (four)           



     per tablet                                         hours as           



                                                  needed for           



                                                  Pain.           

 

     zinc      2020-0      Yes            5g   Q.5D Apply 5 g           [facility]



     oxide-yuan      1-16                               topically           Ni

es



     latum      00:00:                               2 (two)           Medical



     (CRITIC-AID      00                                 times           Center



     ) 20-51 %                                         daily.           



     Pste                                                        



     topical                                                        



     paste                                                        

 

     meropenem      2020-0      Yes            1g        Inject 1 g           [facility]



     (MERREM)      1-16                               intravenou           [facility]



     MBP 1 gm in      00:00:                               sly every           M

edical



     100 mL NS      00                                 8 (eight)           [facility]

r



                                                  hours.           

 

     insulin      2020-0      Yes            58U  Q.5D Inject 58           [facility]

[facility]



     glargine      1-16                               Units           [facility]



     (LANTUS)      00:00:                               subcutaneo           Med

ical



     100 unit/mL      00                                 usly 2           Center



     injection                                         (two)           



                                                  times           



                                                  daily Use           



                                                  as             



                                                  directed.           

 

     zinc      2020-0      Yes            5g   Q.5D Apply 5 g           [facility]



     oxide-yuan      1-16                               topically           Ni

es



     latum      00:00:                               2 (two)           Medical



     (CRITIC-AID      00                                 times           Center



     ) 20-51 %                                         daily.           



     Pste                                                        



     topical                                                        



     paste                                                        

 

     meropenem      2020-0      Yes            1g        Inject 1 g           [facility]



     (MERREM)      1-16                               intravenou           [facility]



     MBP 1 gm in      00:00:                               sly every           M

edical



     100 mL NS      00                                 8 (eight)           [facility]

r



                                                  hours.           

 

     insulin      2020-0      Yes            58U  Q.5D Inject 58           [facility]

t



     glargine      1-16                               Units           [facility]



     (LANTUS)      00:00:                               subcutaneo           Med

ical



     100 unit/mL      00                                 usly 2           Center



     injection                                         (two)           



                                                  times           



                                                  daily Use           



                                                  as             



                                                  directed.           

 

     zinc      2020-0      Yes            5g   Q.5D Apply 5 g           [facility]



     oxide-yuan      1-16                               topically           Ni

es



     latum      00:00:                               2 (two)           Medical



     (CRITIC-AID      00                                 times           Center



     ) 20-51 %                                         daily.           



     Pste                                                        



     topical                                                        



     paste                                                        

 

     meropenem      2020-0      Yes            1g        Inject 1 g           [facility]



     (MERREM)      1-16                               intravenou           [facility]



     MBP 1 gm in      00:00:                               sly every           M

edical



     100 mL NS      00                                 8 (eight)           [facility]

r



                                                  hours.           

 

     insulin      2020-0      Yes            58U  Q.5D Inject 58           [facility]

t



     glargine      1-16                               Units           [facility]



     (LANTUS)      00:00:                               subcutaneo           Med

ical



     100 unit/mL      00                                 usly 2           Center



     injection                                         (two)           



                                                  times           



                                                  daily Use           



                                                  as             



                                                  directed.           

 

     zinc      2020-0      Yes            5g   Q.5D Apply 5 g           [facility]



     oxide-yuan      1-16                               topically           Ni

es



     latum      00:00:                               2 (two)           Medical



     (CRITIC-AID      00                                 times           Center



     ) 20-51 %                                         daily.           



     Pste                                                        



     topical                                                        



     paste                                                        

 

     meropenem      2020-0      Yes            1g        Inject 1 g           [facility]



     (MERREM)      1-16                               intravenou           [facility]



     MBP 1 gm in      00:00:                               sly every           M

edical



     100 mL NS      00                                 8 (eight)           [facility]

r



                                                  hours.           

 

     insulin      2020-0      Yes            58U  Q.5D Inject 58           [facility]

t



     glargine      1-16                               Units           [facility]



     (LANTUS)      00:00:                               subcutaneo           Med

ical



     100 unit/mL      00                                 usly 2           Center



     injection                                         (two)           



                                                  times           



                                                  daily Use           



                                                  as             



                                                  directed.           

 

     insulin      -0 - No             0U        Inject           [facility]



     lispro      1-16 01-15                          0-12 Units           [facility]



     (HUMALOG)      00:00: 23:59                          subcutaneo           M

edical



     100 unit/mL      00   :00                           usly 3           Center



     injection                                         (three)           



                                                  times           



                                                  daily           



                                                  before           



                                                  meals.           

 

     insulin      -0 - No             25U       Inject 25           [facility]



     lispro      1-16 01-15                          Units           [facility]



     (HUMALOG)      00:00: 23:59                          subcutaneo           M

edical



     100 unit/mL      00   :00                           usly 3           Center



     injection                                         (three)           



                                                  times           



                                                  daily           



                                                  before           



                                                  meals.           

 

     normal      2018      No                       1,000 mL,           [facility]

a



     saline 0.9%      08                               Rate: 100           l



     IV 1,000 mL      11:04:                               ml/hr,           [facility]

j[facility]



               00                                 Infuse           



                                                  over: 10           



                                                  hr, Route:           



                                                  IV, Dosing           



                                                  Weight           



                                                  131.818           



                                                  kg, Total           



                                                  Volume:           



                                                  1,000,           



                                                  Start           



                                                  date:           



                                                  18           



                                                  5:04:00           



                                                  CST,           



                                                  Duration:           



                                                  30 day,           



                                                  Stop date:           



                                                  18           



                                                  5:03:00           



                                                  CST, 2.4,           



                                                  m2             

 

     normal      2018      No                       1,000 mL,           [facility]

a



     saline 0.9%      1-08                               Rate: 100           l



     IV 1,000 mL      11:04:                               ml/hr,           [facility]

[facility]



               00                                 Infuse           



                                                  over: 10           



                                                  hr, Route:           



                                                  IV, Dosing           



                                                  Weight           



                                                  131.818           



                                                  kg, Total           



                                                  Volume:           



                                                  1,000,           



                                                  Start           



                                                  date:           



                                                  18           



                                                  5:04:00           



                                                  CST,           



                                                  Duration:           



                                                  30 day,           



                                                  Stop date:           



                                                  18           



                                                  5:03:00           



                                                  CST, 2.4,           



                                                  m2             

 

     normal      2018      No                       1,000 mL,           [facility]

a



     saline 0.9%      1-08                               Rate: 100           l



     IV 1,000 mL      11:04:                               ml/hr,           [facility]

[facility]



               00                                 Infuse           



                                                  over: 10           



                                                  hr, Route:           



                                                  IV, Dosing           



                                                  Weight           



                                                  131.818           



                                                  kg, Total           



                                                  Volume:           



                                                  1,000,           



                                                  Start           



                                                  date:           



                                                  18           



                                                  5:04:00           



                                                  CST,           



                                                  Duration:           



                                                  30 day,           



                                                  Stop date:           



                                                  18           



                                                  5:03:00           



                                                  CST, 2.4,           



                                                  m2             

 

     Magnesium      2018      No                       Notes:           [facility]

a



     Sulfate      1-08                               WASTE: F/P           l



               10:36:                               - Sink; E           [place]



                                                -              



                                                  Municipal           



                                                  Trash Bin           

 

     Isolyte S      2018      No                       Notes:           [facility]

a



     PH-7.4      1-08                               (Same as:           l



     (Bolus) IV      10:36:                               Isolyte S           [facility]

[facility]



               00                                 PH 7.4)           

 

     Magnesium      2018      No                       Notes:           [facility]

a



     Sulfate      1-08                               WASTE: F/P           l



               10:36:                               - Sink; E           [facility]



                                                -              



                                                  Municipal           



                                                  Trash Bin           

 

     Isolyte S      2018      No                       Notes:           [facility]

a



     PH-7.4      1-08                               (Same as:           l



     (Bolus) IV      10:36:                               Isolyte S           He

rmann



               00                                 PH 7.4)           

 

     Magnesium      2018      No                       Notes:           [facility]

a



     Sulfate      1-08                               WASTE: F/P           l



               10:36:                               - Sink; E           [facility]



                                                -              



                                                  Municipal           



                                                  Trash Bin           

 

     Isolyte S      2018      No                       Notes:           [facility]

a



     PH-7.4      1-08                               (Same as:           l



     (Bolus) IV      10:36:                               Isolyte S           He

rmann



               00                                 PH 7.4)           

 

     Phenergan      2018      No                       12.5 mg,           [facility]

[facility]



               1-08                               0.5 mL,           l



               10:35:                               Route:           [place]



               00                                 IVPB, Drug           



                                                  form: INJ,           



                                                  ONCE,           



                                                  Dosing           



                                                  Weight           



                                                  131.818,           



                                                  kg,            



                                                  Priority:           



                                                  STAT,           



                                                  Start           



                                                  date:           



                                                  18           



                                                  4:35:00           



                                                  CST, Stop           



                                                  date:           



                                                  18           



                                                  4:35:00           



                                                  CST            

 

     Phenergan      2018-1      No                       12.5 mg,           [facility]

[facility]



               1-08                               0.5 mL,           l



               10:35:                               Route:           [place]



                                                IVPB, Drug           



                                                  form: INJ,           



                                                  ONCE,           



                                                  Dosing           



                                                  Weight           



                                                  131.818,           



                                                  kg,            



                                                  Priority:           



                                                  STAT,           



                                                  Start           



                                                  date:           



                                                  18           



                                                  4:35:00           



                                                  CST, Stop           



                                                  date:           



                                                  18           



                                                  4:35:00           



                                                  CST            

 

     Phenergan      -      No                       12.5 mg,           [facility]

ra[facility]



               1-08                               0.5 mL,           l



               10:35:                               Route:           [facility]



                                                IVPB, Drug           



                                                  form: INJ,           



                                                  ONCE,           



                                                  Dosing           



                                                  Weight           



                                                  131.818,           



                                                  kg,            



                                                  Priority:           



                                                  STAT,           



                                                  Start           



                                                  date:           



                                                  18           



                                                  4:35:00           



                                                  CST, Stop           



                                                  date:           



                                                  18           



                                                  4:35:00           



                                                  CST            

 

     Docusate      2018-0      Yes                      100 mg = 1           [facility]

[facility]



     Sodium 100      5-08                               cap, PO,           l



     MG Oral      14:56:                               BID, 0           [facility]



     Capsule      00                                 Refill(s)           

 

     Zosyn      2018-0      Yes                      0              [facility]



               5-08                               Refill(s)           l



               14:56:                                              [place]



               00                                                

 

     celecoxib      -0      Yes                      200 mg = 1           [facility]

[facility]



     200 mg oral      5-08                               cap, PO,           l



     capsule      14:56:                               BID, 0           [facility]



               00                                 Refill(s)           

 

     ascorbic      -0      Yes                      500 mg = 1           [facility]

[facility]



     acid      5-08                               tab, PO,           l



               14:56:                               BID, 0           [facility]



               00                                 Refill(s)           

 

     acetaminoph      2018-0      Yes                      1,000 mg =           

[facility]



     en 500 mg      5-08                               2 tab, PO,           l



     oral tablet      14:56:                               Q6Hnow, 0           [facility]

[facility]



               00                                 Refill(s)           

 

     Oxycodone      -0      Yes                      5 mg = 1           [facility]

[facility]



     Hydrochlori      5-08                               tab, PO,           l



     de 5 MG      14:56:                               Q4H, PRN           [facility]

n



     Oral Tablet      00                                 Pain Score           



                                                  4-6, 0           



                                                  Refill(s)           

 

     zinc      -0      Yes                      220 mg = 1           [facility]



     sulfate 220      5-08                               cap, PO,           l



     mg oral      14:56:                               Daily, 0           [facility]

n



     capsule      00                                 Refill(s)           

 

     multivitami            Yes                      1 tab, PO,           

[facility]



     n         5-08                               Daily, 0           l



               14:56:                               Refill(s)           [facility]



                                                               

 

     methocarbam            Yes                      1,000 mg =           

[facility]



     ol 500 mg      5-08                               2 tab, PO,           l



     oral tablet      14:56:                               Q8H, 0           [facility]

[facility]



               00                                 Refill(s)           

 

     LORazepam            Yes                      0.5 mg = 1           [facility]

[facility]



     0.5 mg oral      5-08                               tab, PO,           l



     tablet      14:56:                               Q8H, PRN           [facility]



               00                                 Anxiety, 0           



                                                  Refill(s)           

 

     Lidocaine            Yes                      3 patch,           [facility]

[facility]



     Hydrochlori      5-08                               TOP,           l



     de 0.05      14:56:                               Daily,           [place]



     MG/MG      00                                 Remove           



     Transdermal                                         after 12           



     Patch                                         hours, 0           



     [Lidoderm]                                         Refill(s)           

 

     Docusate            Yes                      100 mg = 1           [facility]

[facility]



     Sodium 100      5-08                               cap, PO,           l



     MG Oral      14:56:                               BID, 0           [place]



     Capsule      00                                 Refill(s)           

 

     Zosyn            Yes                      0              [facility]



               5-08                               Refill(s)           l



               14:56:                                              [facility]



               00                                                

 

     celecoxib            Yes                      200 mg = 1           [facility]

[facility]



     200 mg oral      5-08                               cap, PO,           l



     capsule      14:56:                               BID, 0           [place]



               00                                 Refill(s)           

 

     ascorbic            Yes                      500 mg = 1           [facility]

[facility]



     acid      5-08                               tab, PO,           l



               14:56:                               BID, 0           [facility]



               00                                 Refill(s)           

 

     acetaminoph            Yes                      1,000 mg =           

[facility]



     en 500 mg      5-08                               2 tab, PO,           l



     oral tablet      14:56:                               Q6Hnow, 0           [facility]

[facility]



               00                                 Refill(s)           

 

     Oxycodone            Yes                      5 mg = 1           [facility]

[facility]



     Hydrochlori      5-08                               tab, PO,           l



     de 5 MG      14:56:                               Q4H, PRN           [facility]

n



     Oral Tablet      00                                 Pain Score           



                                                  4-6, 0           



                                                  Refill(s)           

 

     zinc            Yes                      220 mg = 1           [facility]



     sulfate 220      5-08                               cap, PO,           l



     mg oral      14:56:                               Daily, 0           [facility]

n



     capsule      00                                 Refill(s)           

 

     multivitami            Yes                      1 tab, PO,           

[facility]



     n         5-08                               Daily, 0           l



               14:56:                               Refill(s)           [facility]



               00                                                

 

     methocarbam            Yes                      1,000 mg =           

[facility]



     ol 500 mg      5-08                               2 tab, PO,           l



     oral tablet      14:56:                               Q8H, 0           [facility]

[facility]



               00                                 Refill(s)           

 

     LORazepam            Yes                      0.5 mg = 1           [facility]

[facility]



     0.5 mg oral      5-08                               tab, PO,           l



     tablet      14:56:                               Q8H, PRN           [facility]



               00                                 Anxiety, 0           



                                                  Refill(s)           

 

     Lidocaine            Yes                      3 patch,           [facility]

[facility]



     Hydrochlori      5-08                               TOP,           l



     de 0.05      14:56:                               Daily,           [place]



     MG/MG      00                                 Remove           



     Transdermal                                         after 12           



     Patch                                         hours, 0           



     [Lidoderm]                                         Refill(s)           

 

     Docusate            Yes                      100 mg = 1           [facility]

[facility]



     Sodium 100      5-08                               cap, PO,           l



     MG Oral      14:56:                               BID, 0           [place]



     Capsule      00                                 Refill(s)           

 

     Zosyn            Yes                      0              [facility]



               5-08                               Refill(s)           l



               14:56:                                              [place]



               00                                                

 

     celecoxib            Yes                      200 mg = 1           [facility]

[facility]



     200 mg oral      5-08                               cap, PO,           l



     capsule      14:56:                               BID, 0           [place]



               00                                 Refill(s)           

 

     ascorbic            Yes                      500 mg = 1           [facility]

[facility]



     acid      5-08                               tab, PO,           l



               14:56:                               BID, 0           [place]



               00                                 Refill(s)           

 

     acetaminoph            Yes                      1,000 mg =           

[facility]



     en 500 mg      5-08                               2 tab, PO,           l



     oral tablet      14:56:                               Q6Hnow, 0           [facility]

[facility]



               00                                 Refill(s)           

 

     Oxycodone            Yes                      5 mg = 1           [facility]

[facility]



     Hydrochlori      5-08                               tab, PO,           l



     de 5 MG      14:56:                               Q4H, PRN           [facility]

n



     Oral Tablet      00                                 Pain Score           



                                                  4-6, 0           



                                                  Refill(s)           

 

     zinc            Yes                      220 mg = 1           [facility]



     sulfate 220      5-08                               cap, PO,           l



     mg oral      14:56:                               Daily, 0           [facility]

n



     capsule      00                                 Refill(s)           

 

     multivitami            Yes                      1 tab, PO,           

[facility]



     n         5-08                               Daily, 0           l



               14:56:                               Refill(s)           [place]



               00                                                

 

     methocarbam            Yes                      1,000 mg =           

[facility]



     ol 500 mg      5-08                               2 tab, PO,           l



     oral tablet      14:56:                               Q8H, 0           [facility]

[facility]



               00                                 Refill(s)           

 

     LORazepam            Yes                      0.5 mg = 1           [facility]

[facility]



     0.5 mg oral      508                               tab, PO,           l



     tablet      14:56:                               Q8H, PRN           [facility]



               00                                 Anxiety, 0           



                                                  Refill(s)           

 

     Lidocaine            Yes                      3 patch,           [facility]

[facility]



     Hydrochlori      5-08                               TOP,           l



     de 0.05      14:56:                               Daily,           [place]



     MG/MG      00                                 Remove           



     Transdermal                                         after 12           



     Patch                                         hours, 0           



     [Lidoderm]                                         Refill(s)           

 

     Robaxin            No                       Notes:           [facility]



               5-06                               (Same           l



               21:00:                               as:Robaxin           [place]



               00                                 )              

 

     Robaxin            No                       Notes:           [facility]



               5-06                               (Same           l



               21:00:                               as:Robaxin           [facility]



                                                )              

 

     Robaxin            No                       Notes:           [facility]



               5-06                               (Same           l



               21:00:                               as:Robaxin           [place]



               00                                 )              

 

     Oxycodone            No                       Notes:           [facility]

a



     Hydrochlori      5-06                               (Same as:           l



     de 5 MG      18:06:                               Roxicodone           [facility]

jorge



     Oral Tablet      00                                 )              

 

     Oxycodone            No                       Notes:           [facility]

a



     Hydrochlori      5-06                               (Same as:           l



     de 5 MG      18:06:                               Roxicodone           [facility]

jorge



     Oral Tablet      00                                 )              

 

     Oxycodone            No                       Notes:           [facility]

a



     Hydrochlori      5-06                               (Same as:           l



     de 5 MG      18:06:                               Roxicodone           [facility]

jorge



     Oral Tablet      00                                 )              

 

     Ativan            No                       Notes:           [facility]



               5-06                               (Same as:           l



               15:18:                               Ativan)           [facility]



               00                                                

 

     Ativan            No                       Notes:           [facility]



               5-06                               (Same as:           l



               15:18:                               Ativan)           [place]



               00                                                

 

     Ativan            No                       Notes:           [facility]



               5-06                               (Same as:           l



               15:18:                               Ativan)           [facility]



                                                               

 

     Trazodone            No                       Notes:           [facility]

a



     Hydrochlori      5-06                               (Same As:           l



     de 50 MG      02:00:                               Desyrel)           [facility]

nn



     Oral Tablet      00                                                

 

     remove            No                       Notes:           [facility]



     patch      5-06                               Remove           l



               02:00:                               patch 12           [place]



               00                                 hours           



                                                  after           



                                                  applicatio           



                                                  n each           



                                                  day.           

 

     Trazodone            No                       Notes:           [facility]

a



     Hydrochlori      5-06                               (Same As:           l



     de 50 MG      02:00:                               Desyrel)           [facility]

nn



     Oral Tablet      00                                                

 

     remove            No                       Notes:           [facility]



     patch      5-06                               Remove           l



               02:00:                               patch 12           [place]



               00                                 hours           



                                                  after           



                                                  applicatio           



                                                  n each           



                                                  day.           

 

     Trazodone            No                       Notes:           [facility]

a



     Hydrochlori      5-06                               (Same As:           l



     de 50 MG      02:00:                               Desyrel)           [facility]

nn



     Oral Tablet      00                                                

 

     remove            No                       Notes:           [facility]



     patch      5-06                               Remove           l



               02:00:                               patch 12           [place]



               00                                 hours           



                                                  after           



                                                  applicatio           



                                                  n each           



                                                  day.           

 

     Oxycodone            No                       Notes:           [facility]

a



     Hydrochlori      5-05                               (Same as:           l



     de 5 MG      22:01:                               Roxicodone           [facility]

jorge



     Oral Tablet      00                                 )              

 

     Oxycodone            No                       Notes:           [facility]

a



     Hydrochlori      5-05                               (Same as:           l



     de 5 MG      22:01:                               Roxicodone           [facility]

jorge



     Oral Tablet      00                                 )              

 

     Oxycodone            No                       Notes:           [facility]

a



     Hydrochlori      5-05                               (Same as:           l



     de 5 MG      22:01:                               Roxicodone           [facility]

jorge



     Oral Tablet      00                                 )              

 

     Celebrex            No                       Notes:           [facility]



               5-05                               NSAID.           l



               22:00:                               Please           [place]



               00                                 check           



                                                  indication           



                                                  . Not for           



                                                  seizure.           



                                                  (Same As:           



                                                  CeleBREX)           

 

     Celebrex      0      No                       Notes:           [facility]



               5-05                               NSAID.           l



               22:00:                               Please           [facility]



               00                                 check           



                                                  indication           



                                                  . Not for           



                                                  seizure.           



                                                  (Same As:           



                                                  CeleBREX)           

 

     Celebrex            No                       Notes:           [facility]



               5-05                               NSAID.           l



               22:00:                               Please           [facility]



               00                                 check           



                                                  indication           



                                                  . Not for           



                                                  seizure.           



                                                  (Same As:           



                                                  CeleBREX)           

 

     Vancomycin      2018-0      No                        2001 mg:           Me

moria



               5-05                               infuse           l



               21:00:                               over 2.5           [facility]



               00                                 hours           

 

     Vancomycin      2018-0      No                        2001 mg:           Me

moria



               5-05                               infuse           l



               21:00:                               over 2.5           [facility]



               00                                 hours           

 

     Vancomycin      2018-0      No                        2001 mg:           Me

moria



               5-05                               infuse           l



               21:00:                               over 2.5           [facility]



               00                                 hours           

 

     Lidocaine      -0      No                       Notes:           [facility]

a



     Hydrochlori      5-05                               Apply only           l



     de 0.05      14:00:                               once for           [facility]

n



     MG/MG      00                                 up to 12           



     Transdermal                                         hours in a           



     Patch                                         24-hour           



     [Lidoderm]                                         period (12           



                                                  hours on           



                                                  and 12           



                                                  hours           



                                                  off).           



                                                  (Same as:           



                                                  Lidoderm)           



                                                  "Remove           



                                                  old patch           



                                                  before           



                                                  applicatio           



                                                  n of new           



                                                  patch"           

 

     Lidocaine            No                       Notes:           [facility]

a



     Hydrochlori      5-05                               Apply only           l



     de 0.05      14:00:                               once for           [facility]

n



     MG/MG      00                                 up to 12           



     Transdermal                                         hours in a           



     Patch                                         24-hour           



     [Lidoderm]                                         period (12           



                                                  hours on           



                                                  and 12           



                                                  hours           



                                                  off).           



                                                  (Same as:           



                                                  Lidoderm)           



                                                  "Remove           



                                                  old patch           



                                                  before           



                                                  applicatio           



                                                  n of new           



                                                  patch"           

 

     Lidocaine            No                       Notes:           [facility]

a



     Hydrochlori      5-05                               Apply only           l



     de 0.05      14:00:                               once for           [facility]

n



     MG/MG      00                                 up to 12           



     Transdermal                                         hours in a           



     Patch                                         24-hour           



     [Lidoderm]                                         period (12           



                                                  hours on           



                                                  and 12           



                                                  hours           



                                                  off).           



                                                  (Same as:           



                                                  Lidoderm)           



                                                  "Remove           



                                                  old patch           



                                                  before           



                                                  applicatio           



                                                  n of new           



                                                  patch"           

 

     Phenergan            No                       Notes: Do           [facility]

[facility]



               5-04                               not give           l



               22:40:                               IV push.           [facility]



               00                                 (Same as:           



                                                  Phenergan)           

 

     Dilaudid            No                       Notes:           [facility]



               5-04                               Same as           l



               22:40:                               Dilaudid           [facility]



               00                                                

 

     Phenergan            No                       Notes: Do           [facility]

[facility]



               5-04                               not give           l



               22:40:                               IV push.           [facility]



               00                                 (Same as:           



                                                  Phenergan)           

 

     Dilaudid            No                       Notes:           [facility]



               5-04                               Same as           l



               22:40:                               Dilaudid           [place]



               00                                                

 

     Phenergan            No                       Notes: Do           [facility]

[facility]



               5-04                               not give           l



               22:40:                               IV push.           [place]



               00                                 (Same as:           



                                                  Phenergan)           

 

     Dilaudid            No                       Notes:           [facility]



               5-04                               Same as           l



               22:40:                               Dilaudid           [place]



                                                               

 

     Tramadol            No                       Notes: Not           [facility]

[facility]



               5-04                               to exceed           l



               22:00:                               400mg/day.           [place]



                                                (Same As:           



                                                  Ultram)           

 

     gabapentin            No                       Notes:           [facility]

ia



               5-04                               (Same as:           l



               22:00:                               Neurontin)                                                           

 

     Acetaminoph            No                       Notes: Max           

[facility]



     en        5-04                               acetaminop           l



               22:00:                               hen 4000           [place]



               00                                 mg/day (4           



                                                  gm/day).           



                                                  (Same as:           



                                                  Tylenol           



                                                  Extra           



                                                  Strength)           

 

     Robaxin            No                       Notes:           [facility]



               5-04                               (Same           l



               22:00:                               as:Robaxin           [place]



                                                )              

 

     Tramadol            No                       Notes: Not           [facility]

[facility]



               5-04                               to exceed           l



               22:00:                               400mg/day.           [facility]



               00                                 (Same As:           



                                                  Ultram)           

 

     gabapentin            No                       Notes:           [facility]

ia



               5-04                               (Same as:           l



               22:00:                               Neurontin)           [facility]



                                                               

 

     Acetaminoph            No                       Notes: Max           

[facility]



     en        5-04                               acetaminop           l



               22:00:                               hen 4000           [place]



               00                                 mg/day (4           



                                                  gm/day).           



                                                  (Same as:           



                                                  Tylenol           



                                                  Extra           



                                                  Strength)           

 

     Robaxin            No                       Notes:           [facility]



               5-04                               (Same           l



               22:00:                               as:Robaxin           [place]



                                                )              

 

     Tramadol            No                       Notes: Not           [facility]

[facility]



               5-04                               to exceed           l



               22:00:                               400mg/day.           [place]



               00                                 (Same As:           



                                                  Ultram)           

 

     gabapentin            No                       Notes:           [facility]

ia



               5-04                               (Same as:           l



               22:00:                               Neurontin)           [facility]



                                                               

 

     Acetaminoph            No                       Notes: Max           

[facility]



     en        5-04                               acetaminop           l



               22:00:                               hen 4000           [place]



               00                                 mg/day (4           



                                                  gm/day).           



                                                  (Same as:           



                                                  Tylenol           



                                                  Extra           



                                                  Strength)           

 

     Robaxin            No                       Notes:           [facility]



               5-04                               (Same           l



               22:00:                               as:Robaxin           [place]



               00                                 )              

 

     Oxycodone            No                       Notes:           [facility]

a



     Hydrochlori      5-04                               (Same as:           l



     de 5 MG      21:33:                               Roxicodone           [facility]

j[facility]



     Oral Tablet      00                                 )              

 

     Oxycodone            No                       Notes:           [facility]

a



     Hydrochlori      5-04                               (Same as:           l



     de 5 MG      21:33:                               Roxicodone           [facility]

j[facility]



     Oral Tablet      00                                 )              

 

     Oxycodone            No                       Notes:           [facility]

a



     Hydrochlori      5-04                               (Same as:           l



     de 5 MG      21:33:                               Roxicodone           [facility]

j[facility]



     Oral Tablet      00                                 )              

 

     Beneprotein            No                       Notes:           [facility]

rajeev



     7 gm pkt      5-04                               (Same as:           l



               21:30:                               Beneprotei           [place]



               00                                 n)             

 

     Beneprotein            No                       Notes:           [facility]

rajeev



     7 gm pkt      5-04                               (Same as:           l



               21:30:                               Beneprotei           [place]



               00                                 n)             

 

     Beneprotein      0      No                       Notes:           [facility]

rajeev



     7 gm pkt      -                               (Same as:           l



               21:30:                               Beneprotei                                            n)             

 

     Acetaminoph      -0      No                       1 tab, PO,           

[facility]



     en 325 MG /      5-04                               TID, 0           l



     Oxycodone      17:12:                               Refill(s)           [facility]

child



     Hydrochlori      00                                                



     de 10 MG                                                        



     Oral Tablet                                                        



     [Percocet                                                        



     10/325]                                                        

 

     Acetaminoph            No                       1 tab, PO,           

[facility]



     en 325 MG /      5-04                               TID, 0           l



     Oxycodone      17:12:                               Refill(s)           Her

child



     Hydrochlori      00                                                



     de 10 MG                                                        



     Oral Tablet                                                        



     [Percocet                                                        



     10/325]                                                        

 

     Acetaminoph            No                       1 tab, PO,           

[facility]



     en 325 MG /      5-04                               TID, 0           l



     Oxycodone      17:12:                               Refill(s)           Her

child



     Hydrochlori      00                                                



     de 10 MG                                                        



     Oral Tablet                                                        



     [Percocet                                                        



     10/325]                                                        

 

     Dilaudid      0      No                       0.5 mg,           [facility]

a



               5-04                               0.25 mL,           l



               16:01:                               Route:                                            IVP, Drug           



                                                  form: INJ,           



                                                  ONCE,           



                                                  Start           



                                                  date:           



                                                  18           



                                                  11:01:00           



                                                  CDT, Stop           



                                                  date:           



                                                  18           



                                                  11:01:00           



                                                  CDT            

 

     Dilaudid      -0      No                       0.5 mg,           [facility]

a



               5-04                               0.25 mL,           l



               16:01:                               Route:                                            IVP, Drug           



                                                  form: INJ,           



                                                  ONCE,           



                                                  Start           



                                                  date:           



                                                  18           



                                                  11:01:00           



                                                  CDT, Stop           



                                                  date:           



                                                  18           



                                                  11:01:00           



                                                  CDT            

 

     Dilaudid      -0      No                       0.5 mg,           [facility]

a



               5-04                               0.25 mL,           l



               16:01:                               Route:                                            IVP, Drug           



                                                  form: INJ,           



                                                  ONCE,           



                                                  Start           



                                                  date:           



                                                  18           



                                                  11:01:00           



                                                  CDT, Stop           



                                                  date:           



                                                  18           



                                                  11:01:00           



                                                  CDT            

 

     Phenergan      -0      No                       Notes:           [facility]

a



               5-04                               (Same as:           l



               15:43:                               Phenergan)                                                           

 

     Dilaudid      -0      No                       2 mg,           [facility]



               5-04                               Route:           l



               15:43:                               IVP, ONCE,                                            Dosing           



                                                  Weight           



                                                  127.027,           



                                                  kg,            



                                                  Priority:           



                                                  STAT,           



                                                  Start           



                                                  date:           



                                                  18           



                                                  10:43:00           



                                                  CDT, Stop           



                                                  date:           



                                                  18           



                                                  10:43:00           



                                                  CDT            

 

     Phenergan            No                       Notes:           [facility]

a



               5-04                               (Same as:           l



               15:43:                               Phenergan)                                                           

 

     Dilaudid            No                       2 mg,           [facility]



               5-04                               Route:           l



               15:43:                               IVP, ONCE,                                            Dosing           



                                                  Weight           



                                                  127.027,           



                                                  kg,            



                                                  Priority:           



                                                  STAT,           



                                                  Start           



                                                  date:           



                                                  18           



                                                  10:43:00           



                                                  CDT, Stop           



                                                  date:           



                                                  18           



                                                  10:43:00           



                                                  CDT            

 

     Phenergan            No                       Notes:           [facility]

a



               5-04                               (Same as:           l



               15:43:                               Phenergan)                                                           

 

     Dilaudid            No                       2 mg,           [facility]



               5-04                               Route:           l



               15:43:                               IVP, ONCE,           [place]                                 Dosing           



                                                  Weight           



                                                  127.027,           



                                                  kg,            



                                                  Priority:           



                                                  STAT,           



                                                  Start           



                                                  date:           



                                                  18           



                                                  10:43:00           



                                                  CDT, Stop           



                                                  date:           



                                                  18           



                                                  10:43:00           



                                                  CDT            

 

     Docusate            No                       Notes:           [facility]



               5-04                               (Same as:           l



               14:00:                               Colace)                                            (Do Not           



                                                  Crush)           

 

     Zinc            No                       Notes:           [facility]



     Sulfate      5-04                               (Zinc           l



               14:00:                               sulfate           [place]



                                                capsule) -           



                                                  220 mg           



                                                  Zinc           



                                                  sulfate =           



                                                  50 mg           



                                                  elemental           



                                                  zinc Same           



                                                  as Zinc           



                                                  Sulfate           

 

     ascorbic            No                       Notes:           [facility]



     acid      5-04                               (Same as:           l



               14:00:                               Vitamin C)                                                           

 

     multivitami            No                       Notes:           [facility]

[facility]



     n         5-04                               (Same           l



               14:00:                               as:[facility])                                            WASTE: F/P           



                                                  - Black; E           



                                                  -              



                                                  Municipal           



                                                  Trash Bin           



                                                  Take with           



                                                  food.           

 

     Docusate            No                       Notes:           [facility]



               5-04                               (Same as:           l



               14:00:                               Colace)           [facility]



                                                (Do Not           



                                                  Crush)           

 

     Zinc            No                       Notes:           [facility]



     Sulfate      5-04                               (Zinc           l



               14:00:                               sulfate           [facility]



                                                capsule) -           



                                                  220 mg           



                                                  Zinc           



                                                  sulfate =           



                                                  50 mg           



                                                  elemental           



                                                  zinc Same           



                                                  as Zinc           



                                                  Sulfate           

 

     ascorbic            No                       Notes:           [facility]



     acid      5-04                               (Same as:           l



               14:00:                               Vitamin C)           [place]                                                

 

     multivitami            No                       Notes:           [facility]

rajeev



     n         5-04                               (Same           l



               14:00:                               as:Thera)           [place]                                 WASTE: F/P           



                                                  - Black; E           



                                                  -              



                                                  Municipal           



                                                  Trash Bin           



                                                  Take with           



                                                  food.           

 

     Docusate            No                       Notes:           [facility]



               5-04                               (Same as:           l



               14:00:                               Colace)                                            (Do Not           



                                                  Crush)           

 

     Zinc            No                       Notes:           [facility]



     Sulfate      -04                               (Zinc           l



               14:00:                               sulfate           [place]                                 capsule) -           



                                                  220 mg           



                                                  Zinc           



                                                  sulfate =           



                                                  50 mg           



                                                  elemental           



                                                  zinc Same           



                                                  as Zinc           



                                                  Sulfate           

 

     ascorbic            No                       Notes:           [facility]



     acid      -04                               (Same as:           l



               14:00:                               Vitamin C)           [facility]                                                

 

     multivitami            No                       Notes:           [facility]

[facility]



     n         5-04                               (Same           l



               14:00:                               as:Thera)                                            WASTE: F/P           



                                                  - Black; E           



                                                  -              



                                                  Municipal           



                                                  Trash Bin           



                                                  Take with           



                                                  food.           

 

     Naproxen            No                       Notes:           [facility]



               5-04                               (Same as:           l



               07:00:                               Naprosyn)           [place]                                 Take with           



                                                  food.           

 

     Zosyn            No                       Notes:           [facility]



               5-04                               (Same as:           l



               07:00:                               Zosyn)           [place]                                 Dosing           



                                                  based on           



                                                  Piperacill           



                                                  in             



                                                  component           



                                                  ***            



                                                  MEDICATION           



                                                  WASTE ***           



                                                  Product           



                                                  Size: 3375           



                                                  mg Product           



                                                  Wasted:           



                                                  ___ mg           

 

     Vancomycin            No                        2001 mg:           [facility]

[facility]



               5-04                               infuse           l



               07:00:                               over 2.5           [place]



               00                                 hours For           



                                                  adult           



                                                  patients           



                                                  only:           



                                                  Round to           



                                                  nearest           



                                                  250 mg per           



                                                  Medical           



                                                  Staff           



                                                  approval           



                                                  ***            



                                                  MEDICATION           



                                                  WASTE ***           



                                                  Product           



                                                  Size: 1000           



                                                  mg Product           



                                                  Wasted:           



                                                  ___ mg           

 

     Enoxaparin            No                       Notes:           [facility]

[facility]



               -04                               (Same as:           l



               07:00:                               Lovenox)           [facility]



                                                               

 

     Naproxen            No                       Notes:           [facility]



               5-04                               (Same as:           l



               07:00:                               Naprosyn)           [facility]



                                                Take with           



                                                  food.           

 

     Zosyn            No                       Notes:           [facility]



               5-04                               (Same as:           l



               07:00:                               Zosyn)           [facility]                                 Dosing           



                                                  based on           



                                                  Piperacill           



                                                  in             



                                                  component           



                                                  ***            



                                                  MEDICATION           



                                                  WASTE ***           



                                                  Product           



                                                  Size: 3375           



                                                  mg Product           



                                                  Wasted:           



                                                  ___ mg           

 

     Vancomycin            No                        2001 mg:           [facility]

[facility]



               5-04                               infuse           l



               07:00:                               over 2.5           [facility]



               00                                 hours For           



                                                  adult           



                                                  patients           



                                                  only:           



                                                  Round to           



                                                  nearest           



                                                  250 mg per           



                                                  Medical           



                                                  Staff           



                                                  approval           



                                                  ***            



                                                  MEDICATION           



                                                  WASTE ***           



                                                  Product           



                                                  Size: 1000           



                                                  mg Product           



                                                  Wasted:           



                                                  ___ mg           

 

     Enoxaparin            No                       Notes:           [facility]

[facility]



               5-04                               (Same as:           l



               07:00:                               Lovenox)           [place]



                                                               

 

     Naproxen            No                       Notes:           [facility]



               -                               (Same as:           l



               07:00:                               Naprosyn)           [place]



                                                Take with           



                                                  food.           

 

     Zosyn            No                       Notes:           [facility]



               -                               (Same as:           l



               07:00:                               Zosyn)                                            Dosing           



                                                  based on           



                                                  Piperacill           



                                                  in             



                                                  component           



                                                  ***            



                                                  MEDICATION           



                                                  WASTE ***           



                                                  Product           



                                                  Size: 3375           



                                                  mg Product           



                                                  Wasted:           



                                                  ___ mg           

 

     Vancomycin            No                        2001 mg:           [facility]

[facility]



               -                               infuse           l



               07:00:                               over 2.5           [place]



               00                                 hours For           



                                                  adult           



                                                  patients           



                                                  only:           



                                                  Round to           



                                                  nearest           



                                                  250 mg per           



                                                  Medical           



                                                  Staff           



                                                  approval           



                                                  ***            



                                                  MEDICATION           



                                                  WASTE ***           



                                                  Product           



                                                  Size: 1000           



                                                  mg Product           



                                                  Wasted:           



                                                  ___ mg           

 

     Enoxaparin            No                       Notes:           [facility]

[facility]



                                              (Same as:           l



               07:00:                               Lovenox)           [place]



                                                               

 

     Sodium            No                       1,000 mL,           [facility]

a



     Chloride                                     Rate: 125           l



     0.9% IV      06:46:                               ml/hr,           [facility]



     1,000 mL      00                                 Infuse           



                                                  over: 8           



                                                  hr, Route:           



                                                  IV, Dosing           



                                                  Weight           



                                                  127.27 kg,           



                                                  Total           



                                                  Volume:           



                                                  1,000,           



                                                  Start           



                                                  date:           



                                                  18           



                                                  1:46:00           



                                                  CDT,           



                                                  Duration:           



                                                  30 day,           



                                                  Stop date:           



                                                  18           



                                                  1:45:00           



                                                  CDT, 2.44,           



                                                  m2             

 

     Saline            No                       Notes:           [facility]



     Flush 0.9%                                     (Same as:           l



               06:46:                               BD             [place]



               00                                 Posiflush)           

 

     Acetaminoph            No                       Notes: Do           [facility]

[facility]



     en        -04                               not exceed           l



               06:46:                               4 gm/day.           [place]



               00                                 (Same as:           



                                                  Tylenol)           

 

     Acetaminoph            No                       Notes:           [facility]

[facility]



     en 325 MG /      -04                               (Same as:           l



     Hydrocodone      06:46:                               Norco           [place]

nn



     Bitartrate      00                                 325/5) Do           



     5 MG Oral                                         not exceed           



     Tablet                                         4gm/day of           



                                                  acetaminop           



                                                  hen.           

 

     Sodium            No                       1,000 mL,           [facility]

a



     Chloride      5-04                               Rate: 125           l



     0.9% IV      06:46:                               ml/hr,           [facility]



     1,000 mL      00                                 Infuse           



                                                  over: 8           



                                                  hr, Route:           



                                                  IV, Dosing           



                                                  Weight           



                                                  127.27 kg,           



                                                  Total           



                                                  Volume:           



                                                  1,000,           



                                                  Start           



                                                  date:           



                                                  18           



                                                  1:46:00           



                                                  CDT,           



                                                  Duration:           



                                                  30 day,           



                                                  Stop date:           



                                                  18           



                                                  1:45:00           



                                                  CDT, 2.44,           



                                                  m2             

 

     Saline            No                       Notes:           [facility]



     Flush 0.9%      5-04                               (Same as:           l



               06:46:                               BD             [place]



               00                                 Posiflush)           

 

     Acetaminoph            No                       Notes: Do           [facility]

[facility]



     en        5-04                               not exceed           l



               06:46:                               4 gm/day.           [place]



               00                                 (Same as:           



                                                  Tylenol)           

 

     Acetaminoph            No                       Notes:           [facility]

[facility]



     en 325 MG /      5-04                               (Same as:           l



     Hydrocodone      06:46:                               Norco           [facility]

nn



     Bitartrate      00                                 325/5) Do           



     5 MG Oral                                         not exceed           



     Tablet                                         4gm/day of           



                                                  acetaminop           



                                                  hen.           

 

     Sodium            No                       1,000 mL,           [facility]

a



     Chloride      5-04                               Rate: 125           l



     0.9% IV      06:46:                               ml/hr,           [facility]



     1,000 mL      00                                 Infuse           



                                                  over: 8           



                                                  hr, Route:           



                                                  IV, Dosing           



                                                  Weight           



                                                  127.27 kg,           



                                                  Total           



                                                  Volume:           



                                                  1,000,           



                                                  Start           



                                                  date:           



                                                  18           



                                                  1:46:00           



                                                  CDT,           



                                                  Duration:           



                                                  30 day,           



                                                  Stop date:           



                                                  18           



                                                  1:45:00           



                                                  CDT, 2.44,           



                                                  m2             

 

     Saline            No                       Notes:           [facility]



     Flush 0.9%      5-04                               (Same as:           l



               06:46:                               BD             [facility]



               00                                 Posiflush)           

 

     Acetaminoph            No                       Notes: Do           [facility]

[facility]



     en        5-04                               not exceed           l



               06:46:                               4 gm/day.           [place]



               00                                 (Same as:           



                                                  Tylenol)           

 

     Acetaminoph            No                       Notes:           [facility]

[facility]



     en 325 MG /      5-04                               (Same as:           l



     Hydrocodone      06:46:                               Norco           [facility]

nn



     Bitartrate      00                                 325/5) Do           



     5 MG Oral                                         not exceed           



     Tablet                                         4gm/day of           



                                                  acetaminop           



                                                  hen.           

 

     Reglan            No                       Notes:           [facility]



               5-04                               (Same as:           l



               04:27:                               Reglan)           [place]



                                                               

 

     Benadryl            No                       Notes:           [facility]



               5-04                               (Same as:           l



               04:27:                               Benadryl)           [facility]                                                

 

     Reglan            No                       Notes:           [facility]



               5-04                               (Same as:           l



               04:27:                               Reglan)           [facility]



                                                               

 

     Benadryl            No                       Notes:           [facility]



               5-04                               (Same as:           l



               04:27:                               Benadryl)           [place]



                                                               

 

     Reglan            No                       Notes:           [facility]



               5-04                               (Same as:           l



               04:27:                               Reglan)           [place]



                                                               

 

     Benadryl            No                       Notes:           [facility]



               5-04                               (Same as:           l



               04:27:                               Benadryl)                                                           

 

     Magnesium            No                       Notes:           [facility]

a



     Sulfate      5-04                               WASTE: F/P           l



               04:26:                               - Sink; E           [facility]



                                                -              



                                                  Municipal           



                                                  Trash Bin           

 

     Sodium            No                       1,000 mL,           [facility]

a



     Chloride      5-04                               1000           l



     0.9%      04:26:                               ml/hr,           [place]



     (Bolus) IV      00                                 Infuse           



                                                  Over: 1           



                                                  hr, Route:           



                                                  IV, 1,000,           



                                                  Drug form:           



                                                  INJ, ONCE,           



                                                  Priority:           



                                                  STAT,           



                                                  Dosing           



                                                  Weight           



                                                  127.273           



                                                  kg, Start           



                                                  date:           



                                                  18           



                                                  23:26:00           



                                                  CDT, Stop           



                                                  date:           



                                                  18           



                                                  23:26:00           



                                                  CDT            

 

     Magnesium            No                       Notes:           [facility]

a



     Sulfate      5-04                               WASTE: F/P           l



               04:26:                               - Sink; E           [place]



                                                -              



                                                  Municipal           



                                                  Trash Bin           

 

     Sodium            No                       1,000 mL,           [facility]

a



     Chloride      5-04                               1000           l



     0.9%      04:26:                               ml/hr,           [place]



     (Bolus) IV      00                                 Infuse           



                                                  Over: 1           



                                                  hr, Route:           



                                                  IV, 1,000,           



                                                  Drug form:           



                                                  INJ, ONCE,           



                                                  Priority:           



                                                  STAT,           



                                                  Dosing           



                                                  Weight           



                                                  127.273           



                                                  kg, Start           



                                                  date:           



                                                  18           



                                                  23:26:00           



                                                  CDT, Stop           



                                                  date:           



                                                  18           



                                                  23:26:00           



                                                  CDT            

 

     Magnesium            No                       Notes:           [facility]

a



     Sulfate      5-04                               WASTE: F/P           l



               04:26:                               - Sink; E           [facility]



                                                -              



                                                  Municipal           



                                                  Trash Bin           

 

     Sodium            No                       1,000 mL,           [facility]

a



     Chloride      -                               1000           l



     0.9%      04:26:                               ml/hr,           [place]



     (Bolus) IV      00                                 Infuse           



                                                  Over: 1           



                                                  hr, Route:           



                                                  IV, 1,000,           



                                                  Drug form:           



                                                  INJ, ONCE,           



                                                  Priority:           



                                                  STAT,           



                                                  Dosing           



                                                  Weight           



                                                  127.273           



                                                  kg, Start           



                                                  date:           



                                                  18           



                                                  23:26:00           



                                                  CDT, Stop           



                                                  date:           



                                                  18           



                                                  23:26:00           



                                                  CDT            

 

     Zosyn      2018-0      No                       4.5 gm,           [facility]



               5-04                               Route:           l



               04:10:                               IVPB,           [place]



               00                                 ONCE,           



                                                  Dosing           



                                                  Weight           



                                                  127.273,           



                                                  kg,            



                                                  Priority:           



                                                  STAT,           



                                                  Start           



                                                  date:           



                                                  18           



                                                  23:10:00           



                                                  CDT, Stop           



                                                  date:           



                                                  18           



                                                  23:10:00           



                                                  CDT, ABX           



                                                  Indication           



                                                  :              



                                                  Bacteremia           

 

     Vancomycin      2018-0      No                         mg:           [facility]

[facility]



               5-04                               infuse           l



               04:10:                               over 2.5           [facility]



               00                                 hours For           



                                                  adult           



                                                  patients           



                                                  only:           



                                                  Round to           



                                                  nearest           



                                                  250 mg per           



                                                  Medical           



                                                  Staff           



                                                  approval           



                                                  ***            



                                                  MEDICATION           



                                                  WASTE ***           



                                                  Product           



                                                  Size: 1000           



                                                  mg Product           



                                                  Wasted:           



                                                  ___ mg           

 

     Zosyn      2018-0      No                       4.5 gm,           [facility]



               5-04                               Route:           l



               04:10:                               IVPB,           [facility]



               00                                 ONCE,           



                                                  Dosing           



                                                  Weight           



                                                  127.273,           



                                                  kg,            



                                                  Priority:           



                                                  STAT,           



                                                  Start           



                                                  date:           



                                                  18           



                                                  23:10:00           



                                                  CDT, Stop           



                                                  date:           



                                                  18           



                                                  23:10:00           



                                                  CDT, ABX           



                                                  Indication           



                                                  :              



                                                  Bacteremia           

 

     Vancomycin      2018-0      No                         mg:           [facility]

[facility]



               5-04                               infuse           l



               04:10:                               over 2.5           [facility]



               00                                 hours For           



                                                  adult           



                                                  patients           



                                                  only:           



                                                  Round to           



                                                  nearest           



                                                  250 mg per           



                                                  Medical           



                                                  Staff           



                                                  approval           



                                                  ***            



                                                  MEDICATION           



                                                  WASTE ***           



                                                  Product           



                                                  Size: 1000           



                                                  mg Product           



                                                  Wasted:           



                                                  ___ mg           

 

     Zosyn      2018-0      No                       4.5 gm,           [facility]



               5-04                               Route:           l



               04:10:                               IVPB,           [facility]



               00                                 ONCE,           



                                                  Dosing           



                                                  Weight           



                                                  127.273,           



                                                  kg,            



                                                  Priority:           



                                                  STAT,           



                                                  Start           



                                                  date:           



                                                  18           



                                                  23:10:00           



                                                  CDT, Stop           



                                                  date:           



                                                  18           



                                                  23:10:00           



                                                  CDT, ABX           



                                                  Indication           



                                                  :              



                                                  Bacteremia           

 

     Vancomycin      2018-0      No                         mg:           Me

moria



               5-04                               infuse           l



               04:10:                               over 2.5           [facility]



               00                                 hours For           



                                                  adult           



                                                  patients           



                                                  only:           



                                                  Round to           



                                                  nearest           



                                                  250 mg per           



                                                  Medical           



                                                  Staff           



                                                  approval           



                                                  ***            



                                                  MEDICATION           



                                                  WASTE ***           



                                                  Product           



                                                  Size: 1000           



                                                  mg Product           



                                                  Wasted:           



                                                  ___ mg           

 

     normal      2018-0      No                       1,000 mL,           [facility]

a



     saline 0.9%      5-04                               Rate: 75           l



     IV 1,000 mL      03:39:                               ml/hr,           [facility]

[facility]



               00                                 Infuse           



                                                  over: 13.3           



                                                  hr, Route:           



                                                  IV, Dosing           



                                                  Weight           



                                                  127.273           



                                                  kg, Total           



                                                  Volume:           



                                                  1,000,           



                                                  Start           



                                                  date:           



                                                  18           



                                                  22:39:00           



                                                  CDT,           



                                                  Duration:           



                                                  30 day,           



                                                  Stop date:           



                                                  18           



                                                  22:38:00           



                                                  CDT, 2.36,           



                                                  m2             

 

     normal      2018-0      No                       1,000 mL,           [facility]

a



     saline 0.9%      5-04                               Rate: 75           l



     IV 1,000 mL      03:39:                               ml/hr,           [facility]

[facility]



               00                                 Infuse           



                                                  over: 13.3           



                                                  hr, Route:           



                                                  IV, Dosing           



                                                  Weight           



                                                  127.273           



                                                  kg, Total           



                                                  Volume:           



                                                  1,000,           



                                                  Start           



                                                  date:           



                                                  18           



                                                  22:39:00           



                                                  CDT,           



                                                  Duration:           



                                                  30 day,           



                                                  Stop date:           



                                                  18           



                                                  22:38:00           



                                                  CDT, 2.36,           



                                                  m2             

 

     normal      2018      No                       1,000 mL,           [facility]

a



     saline 0.9%      5-04                               Rate: 75           l



     IV 1,000 mL      03:39:                               ml/hr,           [facility]

j[facility]



               00                                 Infuse           



                                                  over: 13.3           



                                                  hr, Route:           



                                                  IV, Dosing           



                                                  Weight           



                                                  127.273           



                                                  kg, Total           



                                                  Volume:           



                                                  1,000,           



                                                  Start           



                                                  date:           



                                                  18           



                                                  22:39:00           



                                                  CDT,           



                                                  Duration:           



                                                  30 day,           



                                                  Stop date:           



                                                  18           



                                                  22:38:00           



                                                  CDT, 2.36,           



                                                  m2             

 

     Acetaminoph            No                       Notes: Max           

[facility]



     en        5-04                               acetaminop           l



               02:56:                               hen 4000           [place]



               00                                 mg/day (4           



                                                  gm/day).           



                                                  (Same as:           



                                                  Tylenol           



                                                  Extra           



                                                  Strength)           

 

     Acetaminoph            No                       Notes: Max           

[facility]



     en        5-04                               acetaminop           l



               02:56:                               hen 4000           [place]



               00                                 mg/day (4           



                                                  gm/day).           



                                                  (Same as:           



                                                  Tylenol           



                                                  Extra           



                                                  Strength)           

 

     Acetaminoph            No                       Notes: Max           

[facility]



     en        5-04                               acetaminop           l



               02:56:                               hen 4000           [place]



               00                                 mg/day (4           



                                                  gm/day).           



                                                  (Same as:           



                                                  Tylenol           



                                                  Extra           



                                                  Strength)           

 

     Rocephin      0      No                       Notes:           [facility]



               5-04                               (Same As:           l



               02:21:                               Rocephin).           [facility]



               00                                 ***            



                                                  MEDICATION           



                                                  WASTE ***           



                                                  Product           



                                                  Size: 1000           



                                                  mg Product           



                                                  Wasted:           



                                                  _0__ mg           

 

     Rocephin      -0      No                       Notes:           [facility]



               5-04                               (Same As:           l



               02:21:                               Rocephin).           [facility]



               00                                 ***            



                                                  MEDICATION           



                                                  WASTE ***           



                                                  Product           



                                                  Size: 1000           



                                                  mg Product           



                                                  Wasted:           



                                                  _0__ mg           

 

     Rocephin            No                       Notes:           [facility]



               5-04                               (Same As:           l



               02:21:                               Rocephin).           [facility]



               00                                 ***            



                                                  MEDICATION           



                                                  WASTE ***           



                                                  Product           



                                                  Size: 1000           



                                                  mg Product           



                                                  Wasted:           



                                                  _0__ mg           

 

     Morphine      2018-0      No                       4 mg,           [facility]



               5-04                               Route:           l



               00:05:                               IVP, ONCE,                                            Dosing           



                                                  Weight           



                                                  127.273,           



                                                  kg,            



                                                  Priority:           



                                                  STAT,           



                                                  Start           



                                                  date:           



                                                  18           



                                                  19:05:00           



                                                  CDT, Stop           



                                                  date:           



                                                  18           



                                                  19:05:00           



                                                  CDT            

 

     Morphine      2018-0      No                       4 mg,           [facility]



               5-04                               Route:           l



               00:05:                               IVP, ONCE,                                            Dosing           



                                                  Weight           



                                                  127.273,           



                                                  kg,            



                                                  Priority:           



                                                  STAT,           



                                                  Start           



                                                  date:           



                                                  18           



                                                  19:05:00           



                                                  CDT, Stop           



                                                  date:           



                                                  18           



                                                  19:05:00           



                                                  CDT            

 

     Morphine      2018-0      No                       4 mg,           [facility]



               5-04                               Route:           l



               00:05:                               IVP, ONCE,                                            Dosing           



                                                  Weight           



                                                  127.273,           



                                                  kg,            



                                                  Priority:           



                                                  STAT,           



                                                  Start           



                                                  date:           



                                                  18           



                                                  19:05:00           



                                                  CDT, Stop           



                                                  date:           



                                                  18           



                                                  19:05:00           



                                                  CDT            

 

     Benadryl            No                       Notes:           [facility]



               5-03                               (Same as:           l



               23:14:                               Benadryl)                                                           

 

     Benadryl            No                       Notes:           [facility]



               5-03                               (Same as:           l



               23:14:                               Benadryl)                                                           

 

     Benadryl            No                       Notes:           [facility]



               5-03                               (Same as:           l



               23:14:                               Benadryl)                                                           

 

     Benadryl            No                       Notes:           [facility]



               5-03                               (Same as:           l



               22:56:                               Benadryl)                                                           

 

     Morphine      2018-0      No                       4 mg, 1           [facility]

a



               5-03                               mL, Route:           l



               22:56:                               IVP, Drug                                            form:           



                                                  SOLN,           



                                                  ONCE,           



                                                  Dosing           



                                                  Weight           



                                                  127.273,           



                                                  kg,            



                                                  Priority:           



                                                  STAT,           



                                                  Start           



                                                  date:           



                                                  18           



                                                  17:56:00           



                                                  CDT, Stop           



                                                  date:           



                                                  18           



                                                  17:56:00           



                                                  CDT            

 

     Benadryl      2018-0      No                       Notes:           [facility]



               5-03                               (Same as:           l



               22:56:                               Benadryl)                                                           

 

     Morphine      2018-0      No                       4 mg, 1           [facility]

a



               5-03                               mL, Route:           l



               22:56:                               IVP, Drug                                            form:           



                                                  SOLN,           



                                                  ONCE,           



                                                  Dosing           



                                                  Weight           



                                                  127.273,           



                                                  kg,            



                                                  Priority:           



                                                  STAT,           



                                                  Start           



                                                  date:           



                                                  18           



                                                  17:56:00           



                                                  CDT, Stop           



                                                  date:           



                                                  18           



                                                  17:56:00           



                                                  CDT            

 

     Benadryl      2018-0      No                       Notes:           [facility]



               5-03                               (Same as:           l



               22:56:                               Benadryl)                                                           

 

     Morphine      2018-0      No                       4 mg, 1           [facility]

a



               5-03                               mL, Route:           l



               22:56:                               IVP, Drug                                            form:           



                                                  SOLN,           



                                                  ONCE,           



                                                  Dosing           



                                                  Weight           



                                                  127.273,           



                                                  kg,            



                                                  Priority:           



                                                  STAT,           



                                                  Start           



                                                  date:           



                                                  18           



                                                  17:56:00           



                                                  CDT, Stop           



                                                  date:           



                                                  18           



                                                  17:56:00           



                                                  CDT            

 

     OXYCODONE      2017      Yes                      Take by           [facility]

[facility]



     HCL/ACETAMI      2-20                               mouth.           ity of



     NOPHEN      10:03:                                              [place]



     (PERCOCET      17                                                Medical



     ORAL)                                                        [place]

 

     OXYCODONE      2017      Yes                      Take by           [facility]

rs



     HCL/ACETAMI      2-20                               mouth.           ity of



     NOPHEN      04:03:                                              [place]



     (PERCOCET      17                                                Medical



     ORAL)                                                        [place]

 

     OXYCODONE      -      Yes                      Take by           [facility]

rs



     HCL/ACETAMI      2-20                               mouth.           ity of



     NOPHEN      04:03:                                              [place]



     (PERCOCET      17                                                Medical



     ORAL)                                                        [place]

 

     OXYCODONE      2017      Yes                      Take by           [facility]

rs



     HCL/ACETAMI      2-20                               mouth.           ity of



     NOPHEN      04:03:                                              [place]



     (PERCOCET      17                                                Medical



     ORAL)                                                        [place]

 

     OXYCODONE      2017      Yes                      Take by           [facility]

rs



     HCL/ACETAMI      2-20                               mouth.           ity of



     NOPHEN      04:03:                                              [place]



     (PERCOCET      17                                                Medical



     ORAL)                                                        [facility]

 

     dicyclomine      2017      Yes            20mg      Take 1           [facility]

[facility]



     (BENTYL) 20      2-20                               tablet by           ity

 of



     mg tablet      00:00:                               mouth 4           [place]



               00                                 (four)           Medical



                                                  times           [facility]



                                                  daily.           

 

     proMETHazin      2017      Yes            25mg      Take 1           [facility]

[facility]



     e 25 mg      2-20                               tablet by           ity of



     tablet      00:00:                               mouth           [place]



               00                                 every 6           Medical



                                                  (six)           [facility]



                                                  hours as           



                                                  needed for           



                                                  Nausea and           



                                                  Vomiting           



                                                  (N/V).           

 

     proMETHazin      2017      Yes            25mg      Take 1           [facility]

ers



     e 25 mg      2-20                               tablet by           [facility] of



     tablet      00:00:                               mouth           [place]



               00                                 every 6           Medical



                                                  (six)           Branch



                                                  hours as           



                                                  needed for           



                                                  Nausea and           



                                                  Vomiting           



                                                  (N/V).           

 

     proMETHazin      2017      Yes            25mg      Take 1           [facility]

ers



     e 25 mg      2-20                               tablet by           [facility] of



     tablet      00:00:                               mouth           [place]



               00                                 every 6           Medical



                                                  (six)           Branch



                                                  hours as           



                                                  needed for           



                                                  Nausea and           



                                                  Vomiting           



                                                  (N/V).           

 

     dicyclomine      2017      Yes            20mg      Take 1           [facility]

ers



     (BENTYL) 20      2-20                               tablet by           ity

 of



     mg tablet      00:00:                               mouth 4           [place]



               00                                 (four)           Medical



                                                  times           Branch



                                                  daily.           

 

     proMETHazin      2017      Yes            25mg      Take 1           [facility]

ers



     e 25 mg      2-20                               tablet by           ity of



     tablet      00:00:                               mouth           [place]



               00                                 every 6           Medical



                                                  (six)           Branch



                                                  hours as           



                                                  needed for           



                                                  Nausea and           



                                                  Vomiting           



                                                  (N/V).           

 

     dicyclomine      2017      Yes            20mg      Take 1           [facility]

ers



     (BENTYL) 20      2-20                               tablet by           ity

 of



     mg tablet      00:00:                               mouth            [place]



               00                                 (four)           Medical



                                                  times           Branch



                                                  daily.           

 

     proMETHazin      2017      Yes            25mg      Take 1           [facility]

ers



     e 25 mg      2-20                               tablet by           ity of



     tablet      00:00:                               mouth           [place]



               00                                 every 6           Medical



                                                  (six)           Branch



                                                  hours as           



                                                  needed for           



                                                  Nausea and           



                                                  Vomiting           



                                                  (N/V).           

 

     proMETHazin      2017- No             25mg      Take 1           [facility]i

vers



     e 25 mg      2-20 01-25                          tablet by           [facility] of



     tablet      00:00: 00:00                          mouth           [place]



               00   :00                           every 6           Medical



                                                  (six)           Branch



                                                  hours as           



                                                  needed for           



                                                  Nausea and           



                                                  Vomiting           



                                                  (N/V).           

 

     dicyclomine      2017- No             20mg      Take 1           [facility]

vers



     (BENTYL) 20      2-20 01-15                          tablet by           it

y of



     mg tablet      00:00: 00:00                          mouth 4           [place]

[facility]



               00   :00                           (four)           Medical



                                                  times           [facility]anch



                                                  daily.           

 

     proMETHazin      2017-0      Yes            25mg      Take 1           [facility]

ers



     e 25 mg      1-04                               tablet by           [facility] of



     tablet      00:00:                               mouth           [place]



               00                                 every 6           Medical



                                                  (six)           Branch



                                                  hours as           



                                                  needed for           



                                                  Nausea and           



                                                  Vomiting           



                                                  (N/V) for           



                                                  up to 12           



                                                  doses.           

 

     proMETHazin      2017-0      Yes            25mg      Take 1           [facility]

ers



     e 25 mg      1-04                               tablet by           [facility] of



     tablet      00:00:                               mouth           [place]



               00                                 every 6           Medical



                                                  (six)           Branch



                                                  hours as           



                                                  needed for           



                                                  Nausea and           



                                                  Vomiting           



                                                  (N/V) for           



                                                  up to 12           



                                                  doses.           

 

     proMETHazin      2017-0      Yes            25mg      Take 1           [facility]

ers



     e 25 mg      1-04                               tablet by           ity of



     tablet      00:00:                               mouth           [place]



               00                                 every 6           Medical



                                                  (six)           Branch



                                                  hours as           



                                                  needed for           



                                                  Nausea and           



                                                  Vomiting           



                                                  (N/V) for           



                                                  up to 12           



                                                  doses.           

 

     proMETHazin      2017-0      Yes            25mg      Take 1           [facility]

ers



     e 25 mg      1-04                               tablet by           [facility] of



     tablet      00:00:                               mouth           [place]



               00                                 every 6           Medical



                                                  (six)           Branch



                                                  hours as           



                                                  needed for           



                                                  Nausea and           



                                                  Vomiting           



                                                  (N/V) for           



                                                  up to 12           



                                                  doses.           

 

     proMETHazin      2017-0      Yes            25mg      Take 1           [facility]

ers



     e 25 mg      1-04                               tablet by           [facility] of



     tablet      00:00:                               mouth           [place]



               00                                 every 6           Medical



                                                  (six)           Branch



                                                  hours as           



                                                  needed for           



                                                  Nausea and           



                                                  Vomiting           



                                                  (N/V) for           



                                                  up to 12           



                                                  doses.           

 

     proMETHazin      2017-0      Yes            25mg      Take 1           [facility]

ers



     e 25 mg      1-04                               tablet by           ity of



     tablet      00:00:                               mouth           [place]



               00                                 every 6           Medical



                                                  (six)           Branch



                                                  hours as           



                                                  needed for           



                                                  Nausea and           



                                                  Vomiting           



                                                  (N/V) for           



                                                  up to 12           



                                                  doses.           

 

     proMETHazin      2017-0      Yes            25mg      Take 1           [facility]

ers



     e 25 mg      1-04                               tablet by           [facility] of



     tablet      00:00:                               mouth           [place]



               00                                 every 6           Medical



                                                  (six)           Branch



                                                  hours as           



                                                  needed for           



                                                  Nausea and           



                                                  Vomiting           



                                                  (N/V) for           



                                                  up to 12           



                                                  doses.           

 

     proMETHazin      2017-0      Yes            25mg      Take 1           [facility]

ers



     e 25 mg      1-04                               tablet by           ity of



     tablet      00:00:                               mouth           [place]



               00                                 every 6           Medical



                                                  (six)           Branch



                                                  hours as           



                                                  needed for           



                                                  Nausea and           



                                                  Vomiting           



                                                  (N/V) for           



                                                  up to 12           



                                                  doses.           

 

     proMETHazin      2017-0      Yes            25mg      Take 1           [facility]

ers



     e 25 mg      1-04                               tablet by           ity of



     tablet      00:00:                               mouth           [place]



               00                                 every 6           Medical



                                                  (six)           Branch



                                                  hours as           



                                                  needed for           



                                                  Nausea and           



                                                  Vomiting           



                                                  (N/V) for           



                                                  up to 12           



                                                  doses.           

 

     proMETHazin      - No             25mg      Take 1           Uni

vers



     e 25 mg      1-04 05-11                          tablet by           ity of



     tablet      00:00: 00:00                          mouth           [place]



               00   :00                           every 6           Medical



                                                  (six)           Branch



                                                  hours as           



                                                  needed for           



                                                  Nausea and           



                                                  Vomiting           



                                                  (N/V) for           



                                                  up to 12           



                                                  doses.           







Immunizations







           Ordered    Filled Immunization Date       Status     Comments   [facility]

e



           Immunization Name Name                                        

 

           SARS-COV-2 COVID-19            2021 Completed             [facility]

rsity of



           MODERNA, [id number],            00:00:00                         [place] 

[facility]



           0.25ML VACCINE                                             Branch

 

           SARS-COV-2 COVID-19            2021 Completed             [facility]

rsity of



           MODERNA, [id number],            00:00:00                         [place] 

[facility]



           0.25ML VACCINE                                             Branch

 

           SARS-COV-2 COVID-19            2020 Completed             Unive

rsity of



           MODERNA 12+ YRS            00:00:00                         [place] [facility]

ical



           VACCINE                                                Branch

 

           SARS-COV-2 COVID-19            2020 Completed             [facility]

rsity of



           MODERNA 12+ YRS            00:00:00                         [place] [facility]

ical



           VACCINE                                                Branch

 

           SARS-COV-2 COVID-19            2020 Completed             [facility]

rsity of



           MODERNA 12+ YRS            00:00:00                         [place] [facility]

ical



           VACCINE                                                Branch

 

           SARS-COV-2 COVID-19            2020 Completed             [facility]

rsity of



           MODERNA 12+ YRS            00:00:00                         [place] [facility]

ical



           VACCINE                                                Branch







Vital Signs







             Vital Name   Observation Time Observation Value Comments     Source

 

             Systolic blood 2022 18:00:00 136 mm[Hg]                [facility]

sity of



             pressure                                            [facility]

 

             Diastolic blood 2022 18:00:00 92 mm[Hg]                 Un[facility]

rsity of



             pressure                                            [facility]

 

             Heart rate   2022 18:00:00 99 /min                   [facility]

 

             Body temperature 2022 18:00:00 35.83 Tiffanie                 Univ

ersity [facility]

 

             Respiratory rate 2022 18:00:00 18 /min                   [facility]

 

             Oxygen saturation in 2022 18:00:00 95 /min                   

[facility]



             Arterial blood by                                        [place] [facility]

sarah



             Pulse oximetry                                        Branch

 

             Body weight  2022 10:45:00 135.988 kg                [facility]

ty of



                                                                 [place] [facility]



                                                                 Branch

 

             BMI          2022 10:45:00 60.55 kg/m2               [facility]

ty of



                                                                 [place] [facility]



                                                                 Branch

 

             Body height  2022 02:02:00 149.9 cm                  [facility]

ty of



                                                                 [place] [facility]



                                                                 Branch

 

             Systolic blood 2022 01:11:00 166 mm[Hg]                [facility]

sity of



             pressure                                            [place] [facility]



                                                                 Branch

 

             Diastolic blood 2022 01:11:00 93 mm[Hg]                 [facility]

rsity of



             pressure                                            [place] [facility]



                                                                 Branch

 

             Heart rate   2022 01:09:00 106 /min                  [facility]

ty of



                                                                 [place] [facility]



                                                                 [place]

 

             Body temperature 2022 01:09:00 36.89 Tiffanie                 [facility]iv

ersity of



                                                                 [place] [facility]



                                                                 Branch

 

             Respiratory rate 2022 01:09:00 20 /min                   [facility]iv

ersity of



                                                                 [place] [facility]



                                                                 Branch

 

             Body weight  2022 01:09:00 127.007 kg                [facility]

ty of



                                                                 [place] [facility]



                                                                 Branch

 

             BMI          2022 01:09:00 56.55 kg/m2               [facility]

ty of



                                                                 [place] [facility]



                                                                 Branch

 

             Oxygen saturation in 2022 01:09:00 96 /min                   

[facility]



             Arterial blood by                                        [place] [facility]

sarah



             Pulse oximetry                                        Branch

 

             Systolic blood 2022-10-30 03:00:00 138 mm[Hg]                [facility]

sity of



             pressure                                            [place] [facility]



                                                                 Branch

 

             Diastolic blood 2022-10-30 03:00:00 82 mm[Hg]                 Unive

rsity of



             pressure                                            [place] [facility]



                                                                 Branch

 

             Heart rate   2022-10-30 03:00:00 102 /min                  [facility]

ty of



                                                                 [place] [facility]



                                                                 Branch

 

             Respiratory rate 2022-10-30 03:00:00 20 /min                   Univ

ersity of



                                                                 [place] [facility]



                                                                 Branch

 

             Oxygen saturation in 2022-10-30 03:00:00 97 /min                   

[facility]



             Arterial blood by                                        [place] [facility]

sarah



             Pulse oximetry                                        Branch

 

             Body temperature 2022-10-30 00:13:00 36.5 Tiffanie                  Univ

ersity of



                                                                 [place] [facility]



                                                                 Branch

 

             Body weight  2022-10-30 00:13:00 132.904 kg                [facility]

ty of



                                                                 [place] [facility]



                                                                 Branch

 

             BMI          2022-10-30 00:13:00 59.18 kg/m2               [facility]

ty of



                                                                 [place] [facility]



                                                                 Branch

 

             Systolic blood 2022-10-23 12:42:00 128 mm[Hg]                [facility]

sity of



             pressure                                            [place] [facility]



                                                                 Branch

 

             Diastolic blood 2022-10-23 12:42:00 83 mm[Hg]                 Unive

rsity of



             pressure                                            [place] [facility]



                                                                 Branch

 

             Heart rate   2022-10-23 12:42:00 111 /min                  [facility]

ty of



                                                                 [place] [facility]



                                                                 Branch

 

             Body temperature 2022-10-23 12:42:00 35.94 Tiffanie                 Univ

ersity of



                                                                 [place] [facility]



                                                                 Branch

 

             Respiratory rate 2022-10-23 12:42:00 18 /min                   [facility]iv

ersity of



                                                                 [place] [facility]



                                                                 Branch

 

             Oxygen saturation in 2022-10-23 12:42:00 95 /min                   

[facility]



             Arterial blood by                                        [place] [facility]

sarah



             Pulse oximetry                                        Branch

 

             Body weight  2022-10-23 10:22:00 132.995 kg                [facility]

ty of



                                                                 [place] [facility]



                                                                 Branch

 

             BMI          2022-10-23 10:22:00 59.22 kg/m2               [facility]

ty of



                                                                 [place] [facility]



                                                                 Branch

 

             Body height  2022-10-21 11:00:00 149.9 cm                  [facility]

ty of



                                                                 [place] [facility]



                                                                 [facility]

 

             Systolic blood 2022 17:14:00 130 mm[Hg]                [facility]

[facility]



             pressure                                            [facility]



                                                                 [facility]

 

             Diastolic blood 2022 17:14:00 83 mm[Hg]                 [facility]

[facility]



             pressure                                            [facility]



                                                                 [facility]

 

             Heart rate   2022 17:14:00 100 /min                  [facility]

[facility]



                                                                 [facility]



                                                                 [facility]

 

             Body temperature 2022 17:14:00 36.72 Tiffanie                 [facility]

[facility]



                                                                 [facility]



                                                                 [facility]

 

             Respiratory rate 2022 17:14:00 20 /min                   [facility]

[facility]



                                                                 [facility]



                                                                 [facility]

 

             Oxygen saturation in 2022 17:14:00 96 /min                   

[facility]



             Arterial blood by                                        [facility]

[facility]



             Pulse oximetry                                        [facility]

 

             Body height  2022 10:00:00 149.9 cm                  [facility]

[facility]



                                                                 [facility]



                                                                 [facility]

 

             Body weight  2022 10:00:00 123 kg                    [facility]

[facility]



                                                                 [facility]



                                                                 [facility]

 

             BMI          2022 10:00:00 54.77 kg/m2               [facility]

[facility]



                                                                 [facility]



                                                                 [facility]

 

             Systolic blood 2022 12:40:00 107 mm[Hg]                [facility]

[facility]



             pressure                                            [facility]



                                                                 [facility]

 

             Diastolic blood 2022 12:40:00 64 mm[Hg]                 [facility]

[facility]



             pressure                                            [facility]



                                                                 [facility]

 

             Heart rate   2022 12:40:00 99 /min                   [facility]

[facility]



                                                                 [facility]



                                                                 [facility]

 

             Body temperature 2022 12:40:00 36.83 Tiffanie                 [facility]

[facility] of



                                                                 [facility]



                                                                 [facility]

 

             Respiratory rate 2022 12:40:00 18 /min                   [facility]

[facility]



                                                                 [facility]



                                                                 [facility]

 

             Oxygen saturation in 2022 12:40:00 95 /min                   

[facility]



             Arterial blood by                                        [facility]

[facility]



             Pulse oximetry                                        [facility]

 

             Body height  2022 10:00:00 149.9 cm                  [facility]

[facility]



                                                                 [facility]



                                                                 [facility]

 

             Body weight  2022 10:00:00 123 kg                    [facility]

[facility]



                                                                 [facility]



                                                                 [facility]

 

             BMI          2022 10:00:00 54.77 kg/m2               [facility]

[facility]



                                                                 [facility]



                                                                 [facility]

 

             Heart rate   2022 23:00:00 91 /min                   [facility]

[facility]



                                                                 [facility]



                                                                 [facility]

 

             Oxygen saturation in 2022 23:00:00 95 /min                   

[facility]



             Arterial blood by                                        [facility]

[facility]



             Pulse oximetry                                        [facility]

 

             Systolic blood 2022 22:02:00 134 mm[Hg]                [facility]

[facility]



             pressure                                            [facility]



                                                                 [facility]

 

             Diastolic blood 2022 22:02:00 83 mm[Hg]                 [facility]

[facility] of



             pressure                                            [facility]



                                                                 [facility]

 

             Body temperature 2022 21:00:00 36.83 Tiffanie                 [facility]

ers[facility]



                                                                 [facility]



                                                                 [facility]

 

             Respiratory rate 2022 21:00:00 28 /min                   [facility]

[facility] of



                                                                 [facility]



                                                                 [facility]

 

             Body weight  2022 09:00:00 134.99 kg                 [facility]

[facility]



                                                                 [facility]



                                                                 [facility]

 

             BMI          2022 09:00:00 60.08 kg/m2               [facility]

[facility]



                                                                 [facility]



                                                                 [facility]

 

             Body height  2022 22:22:00 149.9 cm                  [facility]

[facility]



                                                                 [facility]



                                                                 [facility]

 

             Systolic blood 2022 03:38:00 126 mm[Hg]                [facility]

[facility] of



             pressure                                            [facility]



                                                                 [facility]

 

             Diastolic blood 2022 03:38:00 90 mm[Hg]                 [facility]

[facility]



             pressure                                            [facility]



                                                                 [facility]

 

             Heart rate   2022 03:38:00 97 /min                   [facility]

[facility]



                                                                 [facility]



                                                                 [facility]

 

             Respiratory rate 2022 03:38:00 18 /min                   [facility]

[facility]



                                                                 [facility]



                                                                 [facility]

 

             Oxygen saturation in 2022 03:38:00 97 /min                   

[facility]



             Arterial blood by                                        [facility]

[facility]



             Pulse oximetry                                        [facility]

 

             Body temperature 2022-07-15 22:09:00 36.94 Tiffanie                 [facility]

[facility]



                                                                 [facility]



                                                                 [facility]

 

             Body height  2022-07-15 22:09:00 149.9 cm                  [facility]

[facility]



                                                                 [facility]



                                                                 [facility]

 

             Body weight  2022-07-15 22:09:00 127.007 kg                [facility]

[facility]



                                                                 [facility]



                                                                 [facility]

 

             BMI          2022-07-15 22:09:00 56.55 kg/m2               [facility]

[facility]



                                                                 [facility]



                                                                 [facility]

 

             Systolic blood 2022 11:00:00 143 mm[Hg]                [facility]

[facility]



             pressure                                            [facility]



                                                                 [facility]

 

             Diastolic blood 2022 11:00:00 91 mm[Hg]                 [facility]

[facility]



             pressure                                            [facility]



                                                                 [facility]

 

             Heart rate   2022 11:00:00 100 /min                  [facility]

[facility]



                                                                 [facility]



                                                                 [facility]

 

             Respiratory rate 2022 11:00:00 16 /min                   [facility]

[facility]



                                                                 [facility]



                                                                 [facility]

 

             Oxygen saturation in 2022 11:00:00 96 /min                   

[facility]



             Arterial blood by                                        [facility]

[facility]



             Pulse oximetry                                        [facility]

 

             Body temperature 2022 09:55:00 36.89 Tiffanie                 [facility]

[facility]



                                                                 [facility]



                                                                 [facility]

 

             Body height  2022 09:55:00 149.9 cm                  [facility]

[facility]



                                                                 [facility]



                                                                 [facility]

 

             Body weight  2022 09:55:00 127.007 kg                [facility]

[facility]



                                                                 [facility]



                                                                 [facility]

 

             BMI          2022 09:55:00 56.55 kg/m2               [facility]

[facility]



                                                                 [facility]



                                                                 [facility]

 

             Systolic blood 2022 00:43:00 132 mm[Hg]                [facility]

[facility]



             pressure                                            [facility]



                                                                 [facility]

 

             Diastolic blood 2022 00:43:00 88 mm[Hg]                 [facility]

[facility]



             pressure                                            [facility]



                                                                 [facility]

 

             Heart rate   2022 00:43:00 107 /min                  [facility]

[facility]



                                                                 [facility]



                                                                 [facility]

 

             Body temperature 2022 00:43:00 36.33 Tiffanie                 [facility]

ers[facility] of



                                                                 [facility]



                                                                 [facility]

 

             Respiratory rate 2022 00:43:00 18 /min                   [facility]

[facility] of



                                                                 [facility]



                                                                 [facility]

 

             Oxygen saturation in 2022 00:43:00 95 /min                   

[facility]



             Arterial blood by                                        [facility]

[facility]



             Pulse oximetry                                        [facility]

 

             Body height  2022 11:31:00 149.9 cm                  [facility]

[facility]



                                                                 [facility]



                                                                 [facility]

 

             Body weight  2022 11:31:00 127.007 kg                [facility]

[facility]



                                                                 [facility]



                                                                 [facility]

 

             BMI          2022 11:31:00 56.55 kg/m2               [facility]

[facility]



                                                                 [facility]



                                                                 [facility]

 

             Body temperature 2022 16:10:00 36.22 Tiffanie                 [facility]

ers[facility] of



                                                                 [facility]



                                                                 [facility]

 

             Respiratory rate 2022 16:10:00 16 /min                   [facility]

[facility] of



                                                                 [facility]



                                                                 [facility]

 

             Oxygen saturation in 2022 16:10:00 96 /min                   

[facility]



             Arterial blood by                                        [facility]

[facility]



             Pulse oximetry                                        [facility]

 

             Systolic blood 2022 16:10:00 127 mm[Hg]                [facility]

[facility] of



             pressure                                            [facility]



                                                                 [facility]

 

             Diastolic blood 2022 16:10:00 84 mm[Hg]                 [facility]

[facility] of



             pressure                                            [facility]



                                                                 [facility]

 

             Heart rate   2022 16:10:00 98 /min                   [facility]

[facility]



                                                                 [facility]



                                                                 [facility]

 

             Body weight  2022 08:54:00 133.494 kg                [facility]

[facility]



                                                                 [facility]



                                                                 [facility]

 

             BMI          2022 08:54:00 50.52 kg/m2               [facility]

[facility]



                                                                 [facility]



                                                                 [facility]

 

             Body height  2022 03:01:00 162.6 cm                  [facility]

[facility]



                                                                 [facility]



                                                                 [facility]

 

             Systolic blood 2022-06-10 17:03:00 132 mm[Hg]                [facility]

[facility] of



             pressure                                            [facility]



                                                                 [facility]

 

             Diastolic blood 2022-06-10 17:03:00 90 mm[Hg]                 [facility]

[facility] of



             pressure                                            [facility]



                                                                 [facility]

 

             Heart rate   2022-06-10 17:03:00 78 /min                   [facility]

[facility]



                                                                 [facility]



                                                                 [facility]

 

             Body temperature 2022-06-10 17:03:00 35.83 Tiffanie                 [facility]

ers[facility] of



                                                                 [facility]



                                                                 [facility]

 

             Respiratory rate 2022-06-10 17:03:00 14 /min                   [facility]

ers[facility] of



                                                                 [facility]



                                                                 [facility]

 

             Oxygen saturation in 2022-06-10 17:03:00 93 /min                   

[facility]



             Arterial blood by                                        [facility]

[facility]



             Pulse oximetry                                        [facility]

 

             Body weight  2022-06-10 09:00:00 140.933 kg                [facility]

[facility]



                                                                 [facility]



                                                                 [facility]

 

             BMI          2022-06-10 09:00:00 62.75 kg/m2               [facility]

[facility]



                                                                 [facility]



                                                                 [facility]

 

             Body height  2022 12:47:00 149.9 cm                  [facility]

[facility]



                                                                 [facility]



                                                                 [facility]

 

             Systolic blood 2022 20:40:00 125 mm[Hg]                [facility]

[facility] of



             pressure                                            [facility]



                                                                 [facility]

 

             Diastolic blood 2022 20:40:00 75 mm[Hg]                 [facility]

[facility] of



             pressure                                            [facility]



                                                                 [facility]

 

             Heart rate   2022 20:40:00 99 /min                   [facility]

[facility]



                                                                 [facility]



                                                                 [facility]

 

             Body temperature 2022 20:40:00 36.67 Tiffanie                 [facility]

ers[facility] of



                                                                 [facility]



                                                                 [facility]

 

             Respiratory rate 2022 20:40:00 20 /min                   [facility]

ers[facility] of



                                                                 [facility]



                                                                 [facility]

 

             Oxygen saturation in 2022 20:40:00 93 /min                   

[facility]



             Arterial blood by                                        [facility]

[facility]



             Pulse oximetry                                        [facility]

 

             Body weight  2022 22:13:00 137.485 kg                [facility]

[facility]



                                                                 [facility]



                                                                 [facility]

 

             BMI          2022 22:13:00 61.22 kg/m2               [facility]

[facility]



                                                                 [facility]



                                                                 [facility]

 

             Body height  2022 09:10:00 149.9 cm                  [facility]

[facility]



                                                                 [facility]



                                                                 [facility]

 

             Systolic blood 2022 15:49:00 111 mm[Hg]                [facility]

[facility] of



             pressure                                            [facility]



                                                                 [facility]

 

             Diastolic blood 2022 15:49:00 76 mm[Hg]                 [facility]

[facility] of



             pressure                                            [facility]



                                                                 [facility]

 

             Heart rate   2022 15:49:00 109 /min                  [facility]

[facility]



                                                                 [facility]



                                                                 [facility]

 

             Body temperature 2022 15:49:00 36.06 Tiffanie                 [facility]

ers[facility] of



                                                                 [facility]



                                                                 [facility]

 

             Respiratory rate 2022 15:49:00 18 /min                   [facility]

ers[facility] of



                                                                 [facility]



                                                                 [facility]

 

             Oxygen saturation in 2022 15:49:00 92 /min                   

[facility]



             Arterial blood by                                        [facility]

[facility]



             Pulse oximetry                                        [facility]

 

             Body weight  2022 10:47:00 139.481 kg                [facility]

[facility]



                                                                 [facility]



                                                                 [facility]

 

             BMI          2022 10:47:00 62.11 kg/m2               [facility]

[facility]



                                                                 [facility]



                                                                 [facility]

 

             Body height  2022 06:57:00 149.9 cm                  [facility]

[facility]



                                                                 [facility]



                                                                 [facility]

 

             Systolic blood 2022 06:00:00 144 mm[Hg]                [facility]

[facility] of



             pressure                                            [facility]



                                                                 [facility]

 

             Diastolic blood 2022 06:00:00 93 mm[Hg]                 [facility]

[facility] of



             pressure                                            [facility]



                                                                 [facility]

 

             Heart rate   2022 06:00:00 92 /min                   [facility]

[facility]



                                                                 [facility]



                                                                 [facility]

 

             Respiratory rate 2022 06:00:00 22 /min                   [facility]

ers[facility] of



                                                                 [facility]



                                                                 [facility]

 

             Oxygen saturation in 2022 04:00:00 94 /min                   

[facility]



             Arterial blood by                                        [facility]

[facility]



             Pulse oximetry                                        [facility]

 

             Body temperature 2022 03:45:00 37.06 Tiffanie                 [facility]

ers[facility] of



                                                                 [facility]



                                                                 [facility]

 

             Body height  2022 03:45:00 149.9 cm                  [facility]

[facility]



                                                                 [facility]



                                                                 [facility]

 

             Body weight  2022 03:45:00 127.461 kg                [facility]

[facility]



                                                                 [facility]



                                                                 [facility]

 

             BMI          2022 03:45:00 56.76 kg/m2               Univers[facility]

ty of



                                                                 [facility]



                                                                 [facility]

 

             Systolic blood 2022 03:18:00 113 mm[Hg]                [facility]

[facility] of



             pressure                                            [facility]



                                                                 [facility]

 

             Diastolic blood 2022 03:18:00 85 mm[Hg]                 [facility]

[facility] of



             pressure                                            [facility]



                                                                 [facility]

 

             Heart rate   2022 03:18:00 96 /min                   [facility]

[facility]



                                                                 [facility]



                                                                 [facility]

 

             Respiratory rate 2022 03:18:00 18 /min                   [facility]

ers[facility] of



                                                                 [facility]



                                                                 [facility]

 

             Oxygen saturation in 2022 03:18:00 93 /min                   

[facility]



             Arterial blood by                                        [facility]

[facility]



             Pulse oximetry                                        [facility]

 

             Body temperature 2022 01:01:16 36.89 Tiffanie                 [facility]

[facility] of



                                                                 [facility]



                                                                 [facility]

 

             Body weight  2022 23:13:00 127.461 kg                [facility]

[facility]



                                                                 [facility]



                                                                 [facility]

 

             BMI          2022 23:13:00 56.76 kg/m2               [facility]

ty of



                                                                 [facility]



                                                                 [facility]

 

             Systolic blood 2022 21:53:00 142 mm[Hg]                [facility]

[facility]ity of



             pressure                                            [facility]



                                                                 [facility]

 

             Diastolic blood 2022 21:53:00 88 mm[Hg]                 [facility]

rsity of



             pressure                                            [facility]



                                                                 [facility]

 

             Heart rate   2022 21:53:00 96 /min                   [facility]

[facility] of



                                                                 [facility]



                                                                 [facility]

 

             Body temperature 2022 21:53:00 36.06 Tiffanie                 [facility]

ersity of



                                                                 [facility]



                                                                 [facility]

 

             Respiratory rate 2022 21:53:00 18 /min                   [facility]

ersity of



                                                                 [facility]



                                                                 [facility]

 

             Oxygen saturation in 2022 21:53:00 96 /min                   

[facility]



             Arterial blood by                                        [facility]

[facility]



             Pulse oximetry                                        [facility]

 

             Body weight  2022 09:48:00 138.801 kg                [facility]

[facility] of



                                                                 [facility]



                                                                 [facility]

 

             BMI          2022 09:48:00 61.80 kg/m2               [facility]

[facility] of



                                                                 [facility]



                                                                 [facility]

 

             Body height  2022 10:27:00 149.9 cm                  [facility]

[facility] of



                                                                 [facility]



                                                                 [facility]

 

             Systolic blood 2022 03:50:00 142 mm[Hg]                [facility]

[facility]ity of



             pressure                                            [facility]



                                                                 [facility]

 

             Diastolic blood 2022 03:50:00 95 mm[Hg]                 [facility]

rsity of



             pressure                                            [facility]



                                                                 [facility]

 

             Heart rate   2022 03:50:00 93 /min                   [facility]

[facility] of



                                                                 [facility]



                                                                 [facility]

 

             Respiratory rate 2022 03:50:00 17 /min                   [facility]

ersity of



                                                                 [facility]



                                                                 [facility]

 

             Oxygen saturation in 2022 03:50:00 98 /min                   

[facility]



             Arterial blood by                                        [facility]

[facility]



             Pulse oximetry                                        [facility]

 

             Body temperature 2022 20:39:00 36.5 Tiffanie                  Un[facility]

ersity of



                                                                 [facility]



                                                                 [facility]

 

             Body weight  2022 20:39:00 136.079 kg                [facility]

[facility] of



                                                                 [facility]



                                                                 [facility]

 

             BMI          2022 20:39:00 60.59 kg/m2               [facility]

[facility] of



                                                                 [facility]



                                                                 [facility]

 

             Systolic blood 2022 01:46:00 134 mm[Hg]                [facility]

[facility]ity of



             pressure                                            [facility]



                                                                 [facility]

 

             Diastolic blood 2022 01:46:00 100 mm[Hg]                [facility]

rsity of



             pressure                                            [facility]



                                                                 [facility]

 

             Heart rate   2022 01:46:00 102 /min                  [facility]

[facility] of



                                                                 [facility]



                                                                 [facility]

 

             Respiratory rate 2022 01:46:00 22 /min                   [facility]

ersity of



                                                                 [facility]



                                                                 [facility]

 

             Oxygen saturation in 2022 01:46:00 96 /min                   

[facility]



             Arterial blood by                                        [facility]

[facility]



             Pulse oximetry                                        [facility]

 

             Body temperature 2022-01-15 20:52:00 36.67 Tiffanie                 [facility]

ersity of



                                                                 [facility]



                                                                 [facility]

 

             Body weight  2022-01-15 20:52:00 136.079 kg                [facility]

[facility] of



                                                                 [facility]



                                                                 [facility]

 

             BMI          2022-01-15 20:52:00 60.59 kg/m2               [facility]

ty of



                                                                 [facility]



                                                                 [facility]

 

             Systolic blood 2021 23:30:00 175 mm[Hg]                [facility]

[facility]ity of



             pressure                                            [facility]



                                                                 [facility]

 

             Diastolic blood 2021 23:30:00 107 mm[Hg]                [facility]

rsity of



             pressure                                            [facility]



                                                                 [facility]

 

             Heart rate   2021 23:30:00 81 /min                   [facility]

[facility] of



                                                                 [facility]



                                                                 [facility]

 

             Respiratory rate 2021 23:30:00 21 /min                   [facility]

ersity of



                                                                 [facility]



                                                                 [facility]

 

             Oxygen saturation in 2021 23:30:00 97 /min                   

[facility]



             Arterial blood by                                        [facility]

[facility]



             Pulse oximetry                                        [facility]

 

             Body weight  2021 21:55:00 136.079 kg                [facility]

[facility] of



                                                                 [facility]



                                                                 [facility]

 

             BMI          2021 21:55:00 60.59 kg/m2               [facility]

[facility] of



                                                                 [facility]



                                                                 [facility]

 

             Body temperature 2021 21:55:00 37.44 Tiffanie                 [facility]

ersity of



                                                                 [facility]



                                                                 [facility]

 

             Systolic blood 2021-05-10 01:30:00 163 mm[Hg]                [facility]

sity of



             pressure                                            [facility]



                                                                 [facility]

 

             Diastolic blood 2021-05-10 01:30:00 106 mm[Hg]                [facility]

rsity of



             pressure                                            [facility]



                                                                 [facility]

 

             Heart rate   2021-05-10 01:30:00 97 /min                   [facility]

[facility] of



                                                                 [facility]



                                                                 [facility]

 

             Respiratory rate 2021-05-10 01:30:00 21 /min                   [facility]

ersity of



                                                                 [facility]



                                                                 [facility]

 

             Oxygen saturation in 2021-05-10 01:30:00 97 /min                   

[facility]



             Arterial blood by                                        [facility]

[facility]



             Pulse oximetry                                        [facility]

 

             Body temperature 2021 23:27:00 36.83 Tiffanie                 [facility]iv

ersity of



                                                                 [facility]



                                                                 [facility]

 

             Body height  2021 23:27:00 149.9 cm                  [facility]

[facility] of



                                                                 [facility]



                                                                 [facility]

 

             Body weight  2021 23:27:00 136.079 kg                [facility]

[facility] of



                                                                 [facility]



                                                                 [facility]

 

             BMI          2021 23:27:00 60.59 kg/m2               [facility]

[facility] of



                                                                 [facility]



                                                                 [facility]

 

             Systolic blood 2021-05-10 01:30:00 163 mm[Hg]                [facility]

sity of



             pressure                                            [facility]



                                                                 [facility]

 

             Diastolic blood 2021-05-10 01:30:00 106 mm[Hg]                [facility]

rsity of



             pressure                                            [facility]



                                                                 [facility]

 

             Heart rate   2021-05-10 01:30:00 97 /min                   [facility]

[facility] of



                                                                 [facility]



                                                                 [facility]

 

             Respiratory rate 2021-05-10 01:30:00 21 /min                   [facility]

ersity of



                                                                 [facility]



                                                                 [facility]

 

             Oxygen saturation in 2021-05-10 01:30:00 97 /min                   

[facility]



             Arterial blood by                                        [facility]

[facility]



             Pulse oximetry                                        [facility]

 

             Body temperature 2021 23:27:00 36.83 Tiffanie                 Univ

ersity of



                                                                 [facility]



                                                                 [facility]

 

             Body height  2021 23:27:00 149.9 cm                  [facility]

[facility]



                                                                 [facility]



                                                                 [place]

 

             Body weight  2021 23:27:00 136.079 kg                [facility]

[facility] of



                                                                 [facility]



                                                                 [facility]

 

             BMI          2021 23:27:00 60.59 kg/m2               [facility]

[facility] of



                                                                 [facility]



                                                                 [facility]

 

             Systolic blood 2022 16:49:00 127 mm[Hg]                [facility]

sity of



             pressure                                            [facility]



                                                                 [facility]

 

             Diastolic blood 2022 16:49:00 86 mm[Hg]                 [facility]

rsity of



             pressure                                            [facility]



                                                                 [facility]

 

             Heart rate   2022 16:49:00 101 /min                  [facility]

[facility] of



                                                                 [facility]



                                                                 [place]

 

             Body temperature 2022 16:49:00 36.83 Tiffanie                 Univ

ersity of



                                                                 [facility]



                                                                 [facility]

 

             Respiratory rate 2022 16:49:00 20 /min                   [facility]

ersity of



                                                                 [facility]



                                                                 [facility]

 

             Oxygen saturation in 2022 16:49:00 95 /min                   

[facility]



             Arterial blood by                                        [facility]

[facility]



             Pulse oximetry                                        [facility]

 

             Body height  2022 10:00:00 149.9 cm                  [facility]

[facility] of



                                                                 [facility]



                                                                 [facility]

 

             Body weight  2022 10:00:00 123 kg                    [facility]

[facility] of



                                                                 [facility]



                                                                 [facility]

 

             BMI          2022 10:00:00 54.77 kg/m2               [facility]

[facility] of



                                                                 [facility]



                                                                 [facility]

 

             Temperature Oral (F) 2022 21:12:00 98.0 F                    

[facility]

 

             Heart Rate   2022 21:12:00                           [facility]

 [place]

 

             Respitory Rate 2022 21:12:00                           [facility]

 

             Systolic (mm Hg) 2022 21:12:00                           [facility]

[facility] [place]

 

             Diastolic (mm Hg) 2022 21:12:00                           [facility] [place]

 

             Heart Rate   2022 17:09:26                           [facility]

 [place]

 

             Respitory Rate 2022 17:09:26                           [facility]

[facility] [place]

 

             Temperature Oral (F) 2022 17:09:08 98.2 F                    

[facility] [place]

 

             Systolic (mm Hg) 2022 17:08:50                           [facility]

[facility] [place]

 

             Diastolic (mm Hg) 2022 17:08:50                           [facility]

[facility] [place]

 

             Heart Rate   2022 17:08:50                           [facility]

 [place]

 

             Respitory Rate 2022 13:07:22                           [facility]

[facility] [place]

 

             Temperature Oral (F) 2022 13:07:04 97.7 F                    

[facility] [place]

 

             Systolic (mm Hg) 2022 13:06:57                           [facility]

[facility] [place]

 

             Diastolic (mm Hg) 2022 13:06:57                           [facility]

[facility] [place]

 

             Heart Rate   2022 10:00:21                           [facility]

 [place]

 

             Respitory Rate 2022 10:00:21                           [facility]

[facility] [place]

 

             Temperature Oral (F) 2022 09:59:28 97.5 F                    

[facility] [place]

 

             Systolic (mm Hg) 2022 09:58:18                           [facility]

[facility] [place]

 

             Diastolic (mm Hg) 2022 09:58:18                           [facility]

[facility] [place]

 

             Heart Rate   2022 09:58:18                           [facility]

 [place]

 

             Heart Rate   2022 04:58:41                           [facility]

 [place]

 

             Respitory Rate 2022 04:58:41                           [facility]

[facility] [place]

 

             Temperature Oral (F) 2022 04:58:34 97.2 F                    

[facility]

 

             Systolic (mm Hg) 2022 04:57:48                           [facility]

[facility] [place]

 

             Diastolic (mm Hg) 2022 04:57:48                           [facility]

[facility] [place]

 

             Respitory Rate 2022 00:54:28                           [facility]

[facility] [place]

 

             Systolic (mm Hg) 2022 00:54:19                           [facility]

[facility] [place]

 

             Diastolic (mm Hg) 2022 00:54:19                           [facility]

[facility] [place]

 

             Temperature Oral (F) 2022 00:53:24 98.1 F                    

[facility] [place]

 

             Height       2022 23:39:00 149.86 cm                 [facility]

 [place]

 

             Weight       2022 23:39:00                           [facility]

 [place]

 

             BMI Calculated 2022 23:39:00                           [facility]

[facility] [place]

 

             Systolic (mm Hg) 2022 14:00:00                           [facility]

[facility] [place]

 

             Diastolic (mm Hg) 2022 14:00:00                           [facility]

[facility] [place]

 

             Respitory Rate 2022 14:00:00                           [facility]

[facility] [place]

 

             Respitory Rate 2022 13:00:00                           [facility]

[facility] [place]

 

             Systolic (mm Hg) 2022 13:00:00                           [facility]

[facility] [place]

 

             Diastolic (mm Hg) 2022 13:00:00                           [facility]

[facility] [place]

 

             Respitory Rate 2022 12:00:00                           [facility]

[facility] [place]

 

             Systolic (mm Hg) 2022 12:00:00                           [facility]

[facility] [place]

 

             Diastolic (mm Hg) 2022 12:00:00                           [facility]

[facility] [place]

 

             Respitory Rate 2022 05:00:00                           [facility]

[facility] [place]

 

             Systolic (mm Hg) 2022 05:00:00                           [facility]

[facility] [place]

 

             Diastolic (mm Hg) 2022 05:00:00                           [facility]

[facility] [place]

 

             Respitory Rate 2022 04:00:00                           [facility]

[facility] [place]

 

             Systolic (mm Hg) 2022 04:00:00                           [facility]

[facility] [place]

 

             Diastolic (mm Hg) 2022 04:00:00                           [facility]

[facility] [place]

 

             Respitory Rate 2022 03:00:00                           [facility]

[facility] [place]

 

             Systolic (mm Hg) 2022 03:00:00                           [facility]

[facility] [place]

 

             Diastolic (mm Hg) 2022 03:00:00                           [facility]

[facility] [place]

 

             Heart Rate   2022 15:55:41                           [facility]

 [place]

 

             Temperature Oral (F) 2022 15:55:32 98.3 F                    

[facility]

 

             Heart Rate   2022 15:54:37                           [facility]

 [place]

 

             Heart Rate   2022 12:20:12                           [facility]

 [place]

 

             Temperature Oral (F) 2022 12:19:51 97.7 F                    

[facility]

 

             Temperature Oral (F) 2022 09:20:54 97.9 F                    

[facility] [place]

 

             Height       2022 06:18:00 149.86 cm                 [facility]

 [place]

 

             Weight       2022 06:18:00                           [facility]

 [place]

 

             BMI Calculated 2022 06:18:00                           [facility]

[facility] [place]

 

             Respitory Rate 2018 17:30:00                           [facility]

al [place]

 

             Systolic (mm Hg) 2018 17:30:00                           [facility]

[facility] [place]

 

             Diastolic (mm Hg) 2018 17:30:00                           [facility]

[facility] [place]

 

             Temperature Oral (F) 2018 17:30:00 98.4 F                    

[facility] [place]

 

             Temperature Oral (F) 2018 16:23:00 98.6 F                    

[facility] [place]

 

             Systolic (mm Hg) 2018 16:23:00                           [facility]

[facility] [place]

 

             Diastolic (mm Hg) 2018 16:23:00                           [facility]

[facility] [place]

 

             Respitory Rate 2018 14:00:00                           [facility]

al [place]

 

             Systolic (mm Hg) 2018 14:00:00                           [facility]

[facility] [place]

 

             Diastolic (mm Hg) 2018 14:00:00                           [facility]

[facility] [place]

 

             Respitory Rate 2018 13:30:00                           [facility]

[facility] [place]

 

             BMI Calculated 2018 10:16:00                           [facility]

[facility] [place]

 

             Height       2018 10:16:00 149.86 cm                 [facility]

 [place]

 

             Weight       2018 10:16:00                           [facility]

 [place]

 

             Heart Rate   2018 10:16:00                           [facility]

 [place]

 

             Temperature Oral (F) 2018 10:16:00 97.9 F                    

[facility] [place]

 

             Temperature Oral (F) 2018 13:40:00 97.2 F                    

[facility]

 

             Systolic (mm Hg) 2018 13:40:00                           [facility]

[facility] [place]

 

             Diastolic (mm Hg) 2018 13:40:00                           [facility]

 

             Heart Rate   2018 13:40:00                           [facility]

 

             Respitory Rate 2018 13:40:00                           [facility]

[facility] [place]

 

             Heart Rate   2018 05:24:00                           [facility]

 [facility]

 

             Systolic (mm Hg) 2018 05:24:00                           [facility]

[facility] [place]

 

             Diastolic (mm Hg) 2018 05:24:00                           [facility]

[facility] [place]

 

             Respitory Rate 2018 05:24:00                           [facility]

al [place]

 

             Temperature Oral (F) 2018 05:24:00 98.1 F                    

[facility]

 

             Respitory Rate 2018 01:15:00                           [facility]

[facility] [place]

 

             Systolic (mm Hg) 2018 01:15:00                           [facility]

[facility] [place]

 

             Diastolic (mm Hg) 2018 01:15:00                           [facility]

[facility]

 

             Heart Rate   2018 01:15:00                           [facility]

 [facility]

 

             Temperature Oral (F) 2018 01:15:00 98.1 F                    

[facility] [place]

 

             Weight       2018 14:00:00                           [facility]

 [place]

 

             Weight       2018 14:00:00                           [facility]

 [place]

 

             Weight       2018 14:00:00                           [facility]

 [place]

 

             BMI Calculated 2018 05:43:00                           [facility]

[facility] [place]

 

             Height       2018 05:43:00 162.56 cm                 [facility]

 [place]

 

             Height       2018 22:41:00 149.86 cm                 [facility]

 [place]

 

             BMI Calculated 2018 22:41:00                           [facility]

al [facility]







Procedures







                Procedure       Date / Time     Performing Clinician Source



                                Performed                       

 

                POCT GLUCOSE (AUTOMATED) 2022 22:58:00 Edwardo Lopez

[facility]

 

                POCT GLUCOSE (AUTOMATED) 2022 18:00:00 Edwardo Lopez

[facility]

 

                POCT GLUCOSE (AUTOMATED) 2022 13:46:00 Edwardo Lopez

[facility]

 

                BASIC METABOLIC PANEL 2022 10:49:00 Livan Lui  [facility]

[facility] [place]



                (NA, K, CL, CO2, GLUCOSE,                                 Medica

l Branch



                BUN, CREATININE, CA)                                 

 

                CBC WITH DIFF   2022 10:49:00 [place], [facility]

 

                LACTIC ACID WHOLE BLOOD 2022 02:31:00 Kaylynn Lu  [facility]

 

                MRSA / MSSA SCREEN BY 2022 02:31:00 Edwardo Lopez   [facility]



                PCR, [facility]

 

                POCT GLUCOSE (AUTOMATED) 2022 02:09:00 Edwardo Lopez   [facility]

 

                BLOOD CULTURE SCREEN 2022 00:35:00 Neeta Maria [facility]

 

                URINALYSIS      2022 23:26:00 Neeta Maria [facility]

 

                BLOOD CULTURE SCREEN 2022 23:15:00 Neeta Maria [facility]

 

                COMP. METABOLIC PANEL 2022 23:08:00 Neeta Maria [facility]

[facility] [place]



                ([id number])                                         [facility]

 

                CBC WITH DIFF   2022 23:08:00 Neeta Maria [facility]

 

                LACTIC ACID WHOLE BLOOD 2022 23:07:00 Neeta Maria [facility]

 

                CT ABDOMEN PELVIS W 2022-10-30 02:06:00 Ashley Garay [facility]

 

                LIPASE          2022-10-30 00:59:00 Ashley Garay [facility]

 

                COMP. METABOLIC PANEL 2022-10-30 00:59:00 Ashley Garay [facility]



                ([id number])                                         [facility]

 

                CBC WITH DIFF   2022-10-30 00:59:00 Ashley Garay [facility]

 

                URINALYSIS      2022-10-30 00:49:00 Ashley Garay [facility]

 

                POCT GLUCOSE (AUTOMATED) 2022-10-23 13:14:00 Rachel Bailey  [facility]

 

                POCT GLUCOSE (AUTOMATED) 2022-10-23 01:14:00 Rachel Bailey  [facility]

 

                POCT GLUCOSE (AUTOMATED) 2022-10-22 21:19:00 Rachel Bailey  [facility]

 

                BASIC METABOLIC PANEL 2022-10-22 11:23:00 Leo [facility]



                (NA, K, CL, CO2, GLUCOSE,                                 Medica

l Branch



                BUN, CREATININE, CA)                                 

 

                CBC WITH DIFF   2022-10-22 11:16:00 [facility]

 

                MRSA / MSSA SCREEN BY 2022-10-22 01:13:00 Edwardo Lopez   [facility]



                PCR, [facility]

 

                POCT GLUCOSE (AUTOMATED) 2022-10-22 01:11:00 Leo [facility]

 

                POCT GLUCOSE (AUTOMATED) 2022-10-21 [facility]:32:[facility]

 

                URINE CULTURE   2022-10-21 16:17:00 Leo [facility]

 

                POCT GLUCOSE (AUTOMATED) 2022-10-21 16:08:00 Leo [facility]

 

                POCT GLUCOSE (AUTOMATED) 2022-10-21 12:43:00 Leo [facility]

 

                ASPIRATE OR ABSCESS 2022-10-21 07:35:00 Davey [facility]

[facility] [place]



                CULTURE(AEROBIC/ANAEROBIC                                 Medica

l Branch



                )                                               

 

                URINALYSIS      2022-10-21 07:32:00 Davey [facility]

 

                CT ABDOMEN PELVIS W 2022-10-21 07:25:15 Davey [facility]

[facility] [place]



                CONTRAST                                        [facility]

 

                BLOOD CULTURE SCREEN 2022-10-21 06:01:00 [facility]

 

                LIPASE          2022-10-21 06:01:00 Davey [facility]

 

                COMP. METABOLIC PANEL 2022-10-21 06:01:00 [facility]



                ([id number])                                         [facility]

 

                CBC WITH DIFF   2022-10-21 [facility]

 

                POCT GLUCOSE (AUTOMATED) 2022 18:34:00 Scooby Sharpe

[facility]

 

                POCT GLUCOSE (AUTOMATED) 2022 14:37:00 Scooby Sharpe

[facility]

 

                BASIC METABOLIC PANEL 2022 13:22:00 Jessi Topete [facility]

[facility] [place]



                (NA, K, CL, CO2, GLUCOSE,                                 Medica

l Branch



                BUN, CREATININE, CA)                                 

 

                CBC WITH DIFF   2022 13:22:00 Matt Briseno [facility]

 

                POCT GLUCOSE (AUTOMATED) 2022 10:56:00 Scooby Sharpe

[facility]

 

                POCT GLUCOSE (AUTOMATED) 2022 05:46:00 Scooby Sharpe

[facility]

 

                POCT GLUCOSE (AUTOMATED) 2022 02:17:00 Scooby Sharpe

[facility]

 

                POCT GLUCOSE (AUTOMATED) 2022 23:13:00 Scooby Sharpe

[facility]

 

                POCT GLUCOSE (AUTOMATED) 2022 18:22:00 Scooby Sharpe

[facility]

 

                POCT GLUCOSE (AUTOMATED) 2022 14:56:00 Scooby Sharpe

[facility]

 

                BASIC METABOLIC PANEL 2022 10:03:00 Matt Briseno [facility]

[facility] [place]



                (NA, K, CL, CO2, GLUCOSE,                                 Medica

l Branch



                BUN, CREATININE, CA)                                 

 

                CBC WITH DIFF   2022 10:03:00 Matt Briseno [facility]

 

                POCT GLUCOSE (AUTOMATED) 2022 02:18:00 Scooby Sharpe U

[facility]

 

                POCT GLUCOSE (AUTOMATED) 2022-08-10 22:26:00 Scooby Sharpe

[facility]

 

                POCT GLUCOSE (AUTOMATED) 2022-08-10 18:57:00 Scooby Sharpe U

[facility]

 

                POCT GLUCOSE (AUTOMATED) 2022-08-10 18:57:00 Scooby Sharpe U

[facility]

 

                US DUPLEX VENOUS ARMS 2022-08-10 17:28:45 Matt Briseno [facility]

[facility] [place]



                BILATERAL - BY VASCULAR                                 Medical 

Branch



                LAB                                             

 

                US DUPLEX VENOUS ARMS 2022-08-10 17:28:45 Matt Briseno [facility]

[facility] [place]



                BILATERAL - BY VASCULAR                                 Medical 

Branch



                LAB                                             

 

                POCT GLUCOSE (AUTOMATED) 2022-08-10 15:20:00 Scooby Sharpe

[facility]

 

                POCT GLUCOSE (AUTOMATED) 2022-08-10 15:20:00 Scooby Sharpe

[facility]

 

                MAGNESIUM       2022-08-10 10:19:00 Stas [facility]

 

                BASIC METABOLIC PANEL 2022-08-10 10:19:00 Stas Edwardo Un[facility]

[facility] [place]



                (NA, K, CL, CO2, GLUCOSE,                 Robin         Medica

l Branch



                BUN, CREATININE, CA)                                 

 

                CBC WITH DIFF   2022-08-10 10:19:00 Stas [facility]

 

                MAGNESIUM       2022-08-10 10:19:00 Stas [facility]

 

                BASIC METABOLIC PANEL 2022-08-10 10:19:00 Stas Edwardo Un[facility]

[facility] [place]



                (NA, K, CL, CO2, GLUCOSE,                 Robin         Medica

l Branch



                BUN, CREATININE, CA)                                 

 

                CBC WITH DIFF   2022-08-10 10:19:00 Stas [facility]

 

                POCT GLUCOSE (AUTOMATED) 2022-08-10 02:44:00 Scooby Sharpe

[facility]

 

                POCT GLUCOSE (AUTOMATED) 2022-08-10 02:44:00 Scooby Sharpe

[facility]

 

                POCT GLUCOSE (AUTOMATED) 2022 22:02:00 Scooby Sharpe

[facility]

 

                POCT GLUCOSE (AUTOMATED) 2022 22:02:00 Scooby Sharpe

[facility]

 

                FUNGUS (ROUTINE) CULTURE 2022 17:03:00 Merry Bonds [facility]

 

                TISSUE          2022 17:03:00 Sapphire Bonds [facility]



                CULTURE(AEROBIC/ANAEROBIC                 A               Medica

l Branch



                )                                               

 

                FUNGUS (ROUTINE) CULTURE 2022 17:03:00 Merry Bonds [facility]

 

                TISSUE          2022 17:03:00 Sapphire Bonds [facility]



                CULTURE(AEROBIC/ANAEROBIC                 A               Medica

l Branch



                )                                               

 

                FUNGUS (ROUTINE) CULTURE 2022 17:00:00 Merry Bonds [facility]

 

                TISSUE          2022 17:00:00 Sapphire Bonds [facility]



                CULTURE(AEROBIC/ANAEROBIC                 A               Medica

l Branch



                )                                               

 

                FUNGUS (ROUTINE) CULTURE 2022 17:00:00 Merry Bonds [facility]

 

                TISSUE          2022 17:00:00 Sapphire Bonds [facility]



                CULTURE(AEROBIC/ANAEROBIC                 A               Medica

l Branch



                )                                               

 

                FUNGUS (ROUTINE) CULTURE 2022 16:59:00 Merry Bonds [facility]

 

                TISSUE          2022 16:59:00 Sapphire Bonds [facility]



                CULTURE(AEROBIC/ANAEROBIC                 A               Medica

l Branch



                )                                               

 

                FUNGUS (ROUTINE) CULTURE 2022 16:59:00 Merry Bonds [facility]

 

                TISSUE          2022 16:59:00 Sapphire Bonds [facility]



                CULTURE(AEROBIC/ANAEROBIC                 A               Medica

l Branch



                )                                               

 

                FOOT DEBRIDEMENT 2022 16:11:00 Sapphire Bonds [facility]

 

                FOOT DEBRIDEMENT 2022 16:11:00 Sapphire Bonds [facility]

 

                COVID-19 (ID NOW RAPID 2022 14:36:00 Matt Briseno Uni

[facility] [place]



                TESTING)                                        Medical Branch

 

                LAB ONLY COVID  2022 14:36:00 Finn [facility]

 

                COVID-19 (ID NOW RAPID 2022 14:36:00 Matt Briseno Uni

[facility] [place]



                TESTING)                                        Medical Branch

 

                LAB ONLY COVID  2022 14:36:00 Finn [facility]

 

                POCT GLUCOSE (AUTOMATED) 2022 14:07:00 An [facility]  [facility]

 

                POCT GLUCOSE (AUTOMATED) 2022 14:07:00 An Chambers  Uni

[facility]

 

                HB ECG ROUTINE & RHYTHM 2022 13:27:17 Jesis Topete [facility]



                STRIP                                           [facility]

 

                CBC WITH DIFF   2022 10:55:00 Stas [facility]

 

                CBC WITH DIFF   2022 10:55:00 Stas [facility]

 

                MAGNESIUM       2022 10:40:00 Stas [facility]

 

                BASIC METABOLIC PANEL 2022 10:40:00 Edwardo Mcclelland Un[facility]

[facility] [place]



                (NA, K, CL, CO2, GLUCOSE,                 Robin         Medica

l Branch



                BUN, CREATININE, CA)                                 

 

                COVID-19 (ID NOW RAPID 2022 10:40:00 Valeriy Ankita    Unive

[facility] [place]



                TESTING)                                        Medical Branch

 

                LAB ONLY COVID  2022 10:40:00 Valeriy [facility] o

[facility]



                INTERPRETATION                                  Medical Branch

 

                MAGNESIUM       2022 10:40:00 Stas [facility]

 

                BASIC METABOLIC PANEL 2022 10:40:00 Edwardo Mcclelland Unive

[facility] [place]



                (NA, K, CL, CO2, GLUCOSE,                 Robin         Medica

l Branch



                BUN, CREATININE, CA)                                 

 

                COVID-19 (ID NOW RAPID 2022 10:40:00 Valeriy Ankita    Unive

[facility] [place]



                TESTING)                                        Medical Branch

 

                LAB ONLY COVID  2022 10:40:00 Valeriy [facility]

 

                POCT GLUCOSE (AUTOMATED) 2022 02:29:00 An Chambers  [facility]

[facility]

 

                POCT GLUCOSE (AUTOMATED) 2022 02:29:00 An Chambers  Uni

[facility]

 

                POCT GLUCOSE (AUTOMATED) 2022 23:39:00 An Chambers  Uni

[facility] [place] [facility]

 

                POCT GLUCOSE (AUTOMATED) 2022 23:39:00 An Chambers  [facility]

 

                POCT GLUCOSE (AUTOMATED) 2022 17:10:00 An Chambers  Uni

[facility] [place] [facility]

 

                POCT GLUCOSE (AUTOMATED) 2022 17:10:00 An Chambers  Uni

[facility] [place] [facility]

 

                POCT GLUCOSE (AUTOMATED) 2022 17:10:00 An Chambers  Uni

[facility]

 

                POCT GLUCOSE (AUTOMATED) 2022 15:54:00 An Chambers  Uni

[facility] [place] [facility]

 

                POCT GLUCOSE (AUTOMATED) 2022 15:54:00 An Chambers  Uni

[facility] [place] [facility]

 

                POCT GLUCOSE (AUTOMATED) 2022 15:54:00 An Chambers  [facility]

 

                SEDIMENTATION RATE 2022 08:36:00 Isabell [facility]

 

                BASIC METABOLIC PANEL 2022 08:36:00 Zaki [facility]



                (NA, K, CL, CO2, GLUCOSE,                                 Medica

l Branch



                BUN, CREATININE, CA)                                 

 

                CBC WITH DIFF   2022 08:36:00 Zaki [facility]

 

                MAGNESIUM       2022 08:36:00 Zaki [facility]

 

                MAGNESIUM       2022 08:36:00 Zaki [facility]

 

                BASIC METABOLIC PANEL 2022 [facility]:36:00 [facility]



                (NA, K, CL, CO2, GLUCOSE,                                 Medica

l Branch



                BUN, CREATININE, CA)                                 

 

                SEDIMENTATION RATE 2022 08:36:00 Isabell [facility]

 

                CBC WITH DIFF   2022 08:36:00 Zaki [facility]

 

                MAGNESIUM       2022 08:36:00 Zaki [facility]

 

                BASIC METABOLIC PANEL 2022 08:36:00 [facility]



                (NA, K, CL, CO2, GLUCOSE,                                 Medica

l Branch



                BUN, CREATININE, CA)                                 

 

                SEDIMENTATION RATE 2022 08:36:00 Isabell [facility]

 

                CBC WITH DIFF   2022 08:36:00 Carolann Haines [place] o

[facility]

 

                POCT GLUCOSE (AUTOMATED) 2022 00:57:00 Yo Law Un

[facility] 

[facility]

 

                POCT GLUCOSE (AUTOMATED) 2022 00:57:00 Yo Law Un

[facility] 

[facility]

 

                POCT GLUCOSE (AUTOMATED) 2022 00:57:00 Yo Law B Un

[facility] 

[facility]

 

                POCT GLUCOSE (AUTOMATED) 2022 21:26:00 Yo Law Un

[facility] 

[facility]

 

                POCT GLUCOSE (AUTOMATED) 2022 21:26:00 Yo Law B Un

[facility] 

[facility]

 

                POCT GLUCOSE (AUTOMATED) 2022 21:26:00 Yo Law Un

[facility] 

[facility]

 

                POCT GLUCOSE (AUTOMATED) 2022 16:47:00 Yo Law B Un

[facility] 

[facility]

 

                POCT GLUCOSE (AUTOMATED) 2022 16:47:00 Yo Law B Un

[facility] 

[facility]

 

                POCT GLUCOSE (AUTOMATED) 2022 16:47:00 Yo Law B Un

[facility] 

[facility]

 

                BASIC METABOLIC PANEL 2022 14:21:00 Carolann Haines [facility]



                (NA, K, CL, CO2, GLUCOSE,                                 Medica

l Branch



                BUN, CREATININE, CA)                                 

 

                C-REACTIVE PROTEIN 2022 14:21:00 Isabell [facility]

 

                C-REACTIVE PROTEIN 2022 14:21:00 Isabell [facility]

 

                BASIC METABOLIC PANEL 2022 14:21:00 Zaki [facility]



                (NA, K, CL, CO2, GLUCOSE,                                 Medica

l Branch



                BUN, CREATININE, CA)                                 

 

                C-REACTIVE PROTEIN 2022 14:21:00 Karina [facility]

 

                BASIC METABOLIC PANEL 2022 14:21:00 Carolann Haines

[facility]



                (NA, K, CL, CO2, GLUCOSE,                                 Medica

l Branch



                BUN, CREATININE, CA)                                 

 

                POCT GLUCOSE (AUTOMATED) 2022 12:34:00 Yo Law Un

[facility] 

[facility]

 

                POCT GLUCOSE (AUTOMATED) 2022 12:34:00 Yo Law B Un

[facility] 

[facility]

 

                POCT GLUCOSE (AUTOMATED) 2022 12:34:00 Yo Law B Un

[facility] 

[facility]

 

                POCT GLUCOSE (AUTOMATED) 2022 01:28:00 Yo Law B Un

[facility] 

[facility]

 

                POCT GLUCOSE (AUTOMATED) 2022 01:28:00 Yo Law Un

[facility] 

[facility]

 

                POCT GLUCOSE (AUTOMATED) 2022 01:28:00 Yo Law Un

[facility] 

[facility]

 

                POCT GLUCOSE (AUTOMATED) 2022 21:13:00 Yo Law Un

[facility] 

[facility]

 

                POCT GLUCOSE (AUTOMATED) 2022 21:13:00 Abdirahman Lawy B Un

[facility] 

[facility]

 

                POCT GLUCOSE (AUTOMATED) 2022 21:13:00 Yo Law Un

[facility] 

[facility]

 

                POCT GLUCOSE (AUTOMATED) 2022 16:39:00 Thanh Yo B Un

[facility] 

[facility]

 

                POCT GLUCOSE (AUTOMATED) 2022 16:39:00 Thanh Yo B Un

[facility] 

[facility]

 

                POCT GLUCOSE (AUTOMATED) 2022 16:39:00 Thanh, Yo B Un

[facility] 

[facility]

 

                POCT GLUCOSE (AUTOMATED) 2022 13:11:00 Abdirahman Lawy B Un

[facility] 

[facility]

 

                POCT GLUCOSE (AUTOMATED) 2022 13:11:00 Yo Law Un

[facility] 

[facility]

 

                POCT GLUCOSE (AUTOMATED) 2022 13:11:00 Yo Law [facility]

[facility] 

[place]



                                                                [facility]

 

                MAGNESIUM       2022 10:28:00 Shaq [facility]

 

                BASIC METABOLIC PANEL 2022 10:28:00 Butt, [facility]



                (NA, K, CL, CO2, GLUCOSE,                                 Medica

l Branch



                BUN, CREATININE, CA)                                 

 

                MAGNESIUM       2022 10:28:00 [facility], [facility]

 

                BASIC METABOLIC PANEL 2022 10:28:00 Butt, [facility]



                (NA, K, CL, CO2, GLUCOSE,                                 Medica

l Branch



                BUN, CREATININE, CA)                                 

 

                MAGNESIUM       2022 10:28:00 Butt, [facility]

 

                BASIC METABOLIC PANEL 2022 10:28:00 [facility]



                (NA, K, CL, CO2, GLUCOSE,                                 Medica

l Branch



                BUN, CREATININE, CA)                                 

 

                POCT GLUCOSE (AUTOMATED) 2022 10:01:00 oY Law Un

[facility] 

[facility]

 

                POCT GLUCOSE (AUTOMATED) 2022 10:01:00 Yo Law Un

[facility] 

[facility]

 

                POCT GLUCOSE (AUTOMATED) 2022 10:01:00 Yo Law Un

[facility] 

[facility]

 

                POCT GLUCOSE (AUTOMATED) 2022 06:32:00 Yo Law Un

[facility] 

[facility]

 

                POCT GLUCOSE (AUTOMATED) 2022 06:32:00 Yo Law Un

[facility] 

[facility]

 

                POCT GLUCOSE (AUTOMATED) 2022 06:32:00 Yo Law Un

[facility] 

[facility]

 

                BASIC METABOLIC PANEL 2022 02:01:00 Butt, [facility]



                (NA, K, CL, CO2, GLUCOSE,                                 Medica

l Branch



                BUN, CREATININE, CA)                                 

 

                BASIC METABOLIC PANEL 2022 02:01:00 Chana [facility]



                (NA, K, CL, CO2, GLUCOSE,                                 Medica

l Branch



                BUN, CREATININE, CA)                                 

 

                BASIC METABOLIC PANEL 2022 02:01:00 Chana [facility]



                (NA, K, CL, CO2, GLUCOSE,                                 Medica

l Branch



                BUN, CREATININE, CA)                                 

 

                POCT GLUCOSE (AUTOMATED) 2022 01:53:00 [facility] 

[place]



                                                                [facility]

 

                POCT GLUCOSE (AUTOMATED) 2022 01:53:00 [facility] 

[place]



                                                                Medical Branch

 

                POCT GLUCOSE (AUTOMATED) 2022 01:53:00 [facility] 

[place]



                                                                Medical Branch

 

                POCT GLUCOSE (AUTOMATED) 2022 23:02:00 [facility] Law [facility] 

[place]



                                                                Medical Branch

 

                POCT GLUCOSE (AUTOMATED) 2022 23:02:00 [facility] 

[place]



                                                                Medical Branch

 

                POCT GLUCOSE (AUTOMATED) 2022 23:02:00 [facility] Law [facility] 

[place]



                                                                Medical Branch

 

                XR FOOT 3+ VW BILATERAL 2022 21:35:00 [facility]



                                                A               Medical Branch

 

                XR FOOT 3+ VW BILATERAL 2022 21:35:00 [facility]



                                                A               Medical Branch

 

                XR FOOT 3+ VW BILATERAL 2022 21:35:00 [facility]



                                                A               Medical Branch

 

                POCT GLUCOSE (AUTOMATED) 2022 20:45:00 [facility] 

[place]



                                                                Medical Branch

 

                POCT GLUCOSE (AUTOMATED) 2022 20:45:00 [facility] 

[place]



                                                                Medical Branch

 

                POCT GLUCOSE (AUTOMATED) 2022 20:45:00 [facility] 

[place]



                                                                Medical Branch

 

                POCT GLUCOSE (AUTOMATED) 2022 16:41:00 Yo Law Un

[facility] of 

[facility]

 

                POCT GLUCOSE (AUTOMATED) 2022 16:41:00 [facility] Un

[facility] 

[facility]

 

                POCT GLUCOSE (AUTOMATED) 2022 16:41:00 [facility]

[facility] 

[facility]

 

                BASIC METABOLIC PANEL 2022 15:35:00 Yo Law [facility]

[facility]



                (NA, K, CL, CO2, GLUCOSE,                                 Medica

l Branch



                BUN, CREATININE, CA)                                 

 

                BASIC METABOLIC PANEL 2022 15:35:00 Yo Law [facility]

[facility]



                (NA, K, CL, CO2, GLUCOSE,                                 Medica

l Branch



                BUN, CREATININE, CA)                                 

 

                BASIC METABOLIC PANEL 2022 15:35:00 Yo [facility]

[facility]



                (NA, K, CL, CO2, GLUCOSE,                                 Medica

l Branch



                BUN, CREATININE, CA)                                 

 

                BETA HYDROXY-BUTYRATE 2022 15:34:00 Hi Collazo     [facility]

 

                BETA HYDROXY-BUTYRATE 2022 15:34:00 Hi Collazo     [facility]

 

                BETA HYDROXY-BUTYRATE 2022 15:34:00 Hi Collazo     [facility]

 

                POCT GLUCOSE (AUTOMATED) 2022 14:20:00 [facility] Law [facility]

[facility] 

[facility]

 

                POCT GLUCOSE (AUTOMATED) 2022 14:20:00 [facility]

[facility] 

[facility]

 

                POCT GLUCOSE (AUTOMATED) 2022 14:20:00 [facility]

[facility] 

[facility]

 

                POCT GLUCOSE (AUTOMATED) 2022 12:52:00 [facility] Un

[facility] 

[facility]

 

                POCT GLUCOSE (AUTOMATED) 2022 12:52:00 [facility] Un

[facility] 

[facility]

 

                POCT GLUCOSE (AUTOMATED) 2022 12:52:00 [facility] Un

[facility] 

[facility]

 

                POCT GLUCOSE (AUTOMATED) 2022 09:04:00 [facility]

[facility] 

[facility]

 

                POCT GLUCOSE (AUTOMATED) 2022 09:04:00 [facility]

[facility] 

[facility]

 

                POCT GLUCOSE (AUTOMATED) 2022 09:04:00 [facility]

[facility] 

[facility]

 

                BASIC METABOLIC PANEL 2022 06:13:00 Skylar Tinajero  [facility]



                (NA, K, CL, CO2, GLUCOSE,                                 Medica

l Branch



                BUN, CREATININE, CA)                                 

 

                GLUTAMIC ACID   2022 06:13:00 Tanvi [facility]



                DECARBOXYLASE AB                                 [facility]

 

                CBC WITHOUT DIFF 2022 06:13:00 [facility]

 

                MAGNESIUM       2022 06:13:00 [facility]

 

                MAGNESIUM       2022 06:13:00 [facility]

 

                BASIC METABOLIC PANEL 2022 06:13:00 [facility]



                (NA, K, CL, CO2, GLUCOSE,                                 Medica

l Branch



                BUN, CREATININE, CA)                                 

 

                CBC WITHOUT DIFF 2022 06:13:00 [facility]

 

                GLUTAMIC AC[facility]   2022 06:13:00 [facility]



                DECARBOXYLASE AB                                 [facility]

 

                MAGNESIUM       2022 06:13:00 [facility]

 

                BASIC METABOLIC PANEL 2022 06:13:00 Skylar Tinajero  [facility]



                (NA, K, CL, CO2, GLUCOSE,                                 Medica

l Branch



                BUN, CREATININE, CA)                                 

 

                CBC WITHOUT DIFF 2022 06:13:00 Tanvi [facility]

 

                GLUTAMIC ACID   2022 06:13:00 [facility]



                DECARBOXYLASE AB                                 [facility]

 

                POCT GLUCOSE (AUTOMATED) 2022 05:45:00 Yo Law Un

[facility] 

[place] [facility]

 

                POCT GLUCOSE (AUTOMATED) 2022 05:45:00 [facility] 

[place]



                                                                Medical Branch

 

                POCT GLUCOSE (AUTOMATED) 2022 05:45:00 [facility]

[facility] 

[place]



                                                                Medical Branch

 

                POCT GLUCOSE (AUTOMATED) 2022 01:26:00 [facility]

[facility] 

[place]



                                                                Medical Branch

 

                POCT GLUCOSE (AUTOMATED) 2022 01:26:00 [facility]

[facility] 

[place]



                                                                Medical Branch

 

                POCT GLUCOSE (AUTOMATED) 2022 01:26:00 Yo Law [facility]

[facility] 

[place]



                                                                Medical Branch

 

                POCT GLUCOSE (AUTOMATED) 2022 23:42:00 Yo Law Un

iversity of 

[facility]

 

                POCT GLUCOSE (AUTOMATED) 2022 23:42:00 Yo Law Un

[facility] 

[facility]

 

                POCT GLUCOSE (AUTOMATED) 2022 23:42:00 Yo [facility] Un

[facility] 

[facility]

 

                BASIC METABOLIC PANEL 2022 22:51:00 [facility] Law [facility]

[facility]



                (NA, K, CL, CO2, GLUCOSE,                                 Medica

l Branch



                BUN, CREATININE, CA)                                 

 

                BASIC METABOLIC PANEL 2022 22:51:00 Yo Law Unive

[facility]



                (NA, K, CL, CO2, GLUCOSE,                                 Medica

l Branch



                BUN, CREATININE, CA)                                 

 

                BASIC METABOLIC PANEL 2022 22:51:00 [facility]ive

[facility]



                (NA, K, CL, CO2, GLUCOSE,                                 Medica

l Branch



                BUN, CREATININE, CA)                                 

 

                POCT GLUCOSE (AUTOMATED) 2022 22:37:00 [facility] Law Un

[facility] 

[facility]

 

                POCT GLUCOSE (AUTOMATED) 2022 22:37:00 [facility]

[facility] 

[place]



                                                                Medical Branch

 

                POCT GLUCOSE (AUTOMATED) 2022 22:37:00 [facility]

[facility] 

[place]



                                                                Medical Branch

 

                POCT GLUCOSE (AUTOMATED) 2022 21:30:00 Yo Law Un

[facility] 

[facility]

 

                POCT GLUCOSE (AUTOMATED) 2022 21:30:00 [facility] Un

[facility] 

[facility]

 

                POCT GLUCOSE (AUTOMATED) 2022 21:30:00 [facility] Un

[facility] 

[facility]

 

                POCT GLUCOSE (AUTOMATED) 2022 20:05:00 [facility] Law Un

[facility] 

[facility]

 

                POCT GLUCOSE (AUTOMATED) 2022 20:05:00 [facility] Law Un

[facility] 

[facility]

 

                POCT GLUCOSE (AUTOMATED) 2022 20:05:00 [facility] Un

[facility] 

[facility]

 

                HB ECG ROUTINE & RHYTHM 2022 19:59:13 [place]ana [place] [facility]

[facility]

 

                HB ECG ROUTINE & RHYTHM 2022 19:59:13 [place]ana [place] [facility]

[facility]

 

                HB ECG ROUTINE & RHYTHM 2022 19:59:13 Shaq Ahrubén Chon Uni

[facility]

 

                POCT GLUCOSE (AUTOMATED) 2022 19:06:00 Yo Law Un

[facility] 

[facility]

 

                POCT GLUCOSE (AUTOMATED) 2022 19:06:00 [facility] Un

[facility] 

[facility]

 

                POCT GLUCOSE (AUTOMATED) 2022 19:06:00 [facility] Un

[facility] 

[facility]

 

                BLOOD CULTURE SCREEN 2022 18:49:00 Carolann Haines [facility]

 

                BLOOD CULTURE SCREEN 2022 18:49:00 Carolann Haines [facility]

 

                BLOOD CULTURE SCREEN 2022 18:49:00 Carolann Haines [facility]

 

                BLOOD CULTURE SCREEN 2022 18:48:00 Carolann Haines [facility]

 

                BLOOD CULTURE SCREEN 2022 18:48:00 Carolann Haines [facility]

 

                BLOOD CULTURE SCREEN 2022 18:48:00 [facility]

 

                XR CHEST 1 VW   2022 18:34:00 Shaq Chana Chon [facility]

 

                XR CHEST 1 VW   2022 18:34:00 Butt [facility]

 

                XR CHEST 1 VW   2022 18:34:00 Shaq [facility]

 

                BASIC METABOLIC PANEL 2022 17:49:00 Yo Law Unive

[facility] [place]



                (NA, K, CL, CO2, GLUCOSE,                                 Medica

l Branch



                BUN, CREATININE, CA)                                 

 

                TROPONIN I      2022 17:49:00 Vale [facility]

 

                TROPONIN I      2022 17:49:00 [facility]

 

                BASIC METABOLIC PANEL 2022 17:49:00 Yo Law Unive

[facility] [place]



                (NA, K, CL, CO2, GLUCOSE,                                 Medica

l Branch



                BUN, CREATININE, CA)                                 

 

                TROPONIN I      2022 17:49:00 Vale [facility]

 

                BASIC METABOLIC PANEL 2022 17:49:00 Yo Law Unive

[facility] [place]



                (NA, K, CL, CO2, GLUCOSE,                                 Medica

l Branch



                BUN, CREATININE, CA)                                 

 

                POCT GLUCOSE (AUTOMATED) 2022 17:27:00 Yo Law Un

iversity [facility]

 

                POCT GLUCOSE (AUTOMATED) 2022 17:27:00 Yo Law B Un

[facility] of 

[facility]

 

                POCT GLUCOSE (AUTOMATED) 2022 17:27:00 Yo Law B Un

[facility] of 

[facility]

 

                POCT GLUCOSE (AUTOMATED) 2022 15:53:00 Yo Law B Un

[facility] of 

[facility]

 

                POCT GLUCOSE (AUTOMATED) 2022 15:53:00 Yo Law B Un

[facility] of 

[facility]

 

                POCT GLUCOSE (AUTOMATED) 2022 15:53:00 Yo Law B Un

[facility] 

[facility]

 

                BASIC METABOLIC PANEL 2022 15:32:00 Yo Law [facility]



                (NA, K, CL, CO2, GLUCOSE,                                 Medica

l Branch



                BUN, CREATININE, CA)                                 

 

                BASIC METABOLIC PANEL 2022 15:32:00 Yo Law [facility]



                (NA, K, CL, CO2, GLUCOSE,                                 Medica

l Branch



                BUN, CREATININE, CA)                                 

 

                BASIC METABOLIC PANEL 2022 15:32:00 Yo Law [facility]



                (NA, K, CL, CO2, GLUCOSE,                                 Medica

l Branch



                BUN, CREATININE, CA)                                 

 

                POCT GLUCOSE (AUTOMATED) 2022 14:56:00 Yo Law Un

[facility] 

[facility]

 

                POCT GLUCOSE (AUTOMATED) 2022 14:56:00 Yo Law Un

[facility] 

[facility]

 

                POCT GLUCOSE (AUTOMATED) 2022 14:56:00 Yo Law Un

[facility] 

[facility]

 

                POCT GLUCOSE (AUTOMATED) 2022 13:48:00 Yo Law Un

[facility] of 

[facility]

 

                POCT GLUCOSE (AUTOMATED) 2022 13:48:00 Yo Law Un

[facility] 

[place]



                                                                [facility] [place]

 

                POCT GLUCOSE (AUTOMATED) 2022 13:48:00 Yo Law Un

[facility] 

[facility]

 

                POCT GLUCOSE (AUTOMATED) 2022 10:42:00 Yo Law Un

[facility] of 

[facility]

 

                POCT GLUCOSE (AUTOMATED) 2022 10:42:00 Yo Law Un

[facility] 

[facility]

 

                POCT GLUCOSE (AUTOMATED) 2022 10:42:00 Yo Law Un

[facility] 

[facility]

 

                BASIC METABOLIC PANEL 2022 10:14:00 Carolann Haines [facility]



                (NA, K, CL, CO2, GLUCOSE,                                 Medica

l Branch



                BUN, CREATININE, CA)                                 

 

                MAGNESIUM       2022 10:14:00 Haines, [facility]

 

                [facility]       [facility]

 

                BASIC METABOLIC PANEL 2022 10:14:00 Haines, [facility]



                (NA, K, CL, CO2, GLUCOSE,                                 Medica

l Branch



                BUN, CREATININE, CA)                                 

 

                MAGNESIUM       2022 10:14:00 [facility]

 

                BASIC METABOLIC PANEL 2022 10:14:00 Haines, [facility]



                (NA, K, CL, CO2, GLUCOSE,                                 Medica

l Branch



                BUN, CREATININE, CA)                                 

 

                POCT GLUCOSE (AUTOMATED) 2022 09:30:00 Yo Law Un

iversity [facility]

 

                POCT GLUCOSE (AUTOMATED) 2022 09:30:00 Yo Law Un

iversity [facility]

 

                POCT GLUCOSE (AUTOMATED) 2022 09:30:00 Yo Law Un

iversity [facility]

 

                POCT GLUCOSE (AUTOMATED) 2022 07:11:00 Yo Law Un

iversity [facility]

 

                POCT GLUCOSE (AUTOMATED) 2022 07:11:00 Yo Law Un

iversity [facility]

 

                POCT GLUCOSE (AUTOMATED) 2022 07:11:00 Yo Law Un

[facility]

 

                OSMOLALITY, SERUM OR 2022 07:07:00 Yo Law [facility]

 

                BETA HYDROXY-BUTYRATE 2022 07:07:00 Yo Law

[facility]

 

                OSMOLALITY, SERUM OR 2022 07:07:00 Yo Law

sitlissa [facility]

 

                BETA HYDROXY-BUTYRATE 2022 07:07:00 Yo Law

[facility]

 

                OSMOLALITY, SERUM OR 2022 07:07:00 Yo Law

[facility]

 

                BETA HYDROXY-BUTYRATE 2022 07:07:00 Yo Law Unive

rsity [facility]

 

                BASIC METABOLIC PANEL 2022 07:06:00 ThanhAbdirahmany B Unive

[facility]



                (NA, K, CL, CO2, GLUCOSE,                                 Medica

l Branch



                BUN, CREATININE, CA)                                 

 

                BASIC METABOLIC PANEL 2022 07:06:00 Yo Law B Unive

[facility]



                (NA, K, CL, CO2, GLUCOSE,                                 Medica

l Branch



                BUN, CREATININE, CA)                                 

 

                BASIC METABOLIC PANEL 2022 07:06:00 Yo Law B Unive

[facility]



                (NA, K, CL, CO2, GLUCOSE,                                 Medica

l Branch



                BUN, CREATININE, CA)                                 

 

                CT ABDOMEN PELVIS W 2022 05:28:46 Yo Law Univers

[facility]



                CONTRAST                                        [facility]

 

                CT ABDOMEN PELVIS W 2022 05:28:46 Yo Law Univers

[facility]



                CONTRAST                                        [facility]

 

                CT ABDOMEN PELVIS W 2022 05:28:46 Yo Law B Univers

[facility]



                CONTRAST                                        [facility]

 

                POCT GLUCOSE(AGE >30DAYS) 2022 05:11:00 Yo Law U

[facility]

 

                POCT GLUCOSE(AGE >30DAYS) 2022 05:11:00 Yo Law U

niversity [facility]

 

                POCT GLUCOSE(AGE >30DAYS) 2022 05:11:00 Yo Law U

[facility]

 

                POCT GLUCOSE (AUTOMATED) 2022 05:08:00 Yo Law Un

iversity [facility]

 

                POCT GLUCOSE (AUTOMATED) 2022 05:08:00 Yo Law B Un

iversity of 

[facility]

 

                POCT GLUCOSE (AUTOMATED) 2022 05:08:00 Yo Law Un

iversity [facility]

 

                BLOOD CULTURE SCREEN 2022 04:31:00 Yo Law Univer

sity [facility]

 

                BLOOD CULTURE WORKUP 2022 04:31:00 ThanhYo mills Univer

[facility]

 

                GRAM POSITIVE BLOOD 2022 04:31:00 Yo Law Univers

[facility]



                PATHOGENS DNA                                   [facility]



                PROBE-AEROBIC                                   

 

                BLOOD CULTURE SCREEN 2022 04:31:00 [place] Univer

[facility]

 

                BLOOD CULTURE WORKUP 2022 04:31:00 [place] Univer

[facility]

 

                GRAM POSITIVE BLOOD 2022 04:31:00 Yo Law [facility]



                PATHOGENS DNA                                   [facility]



                PROBE-AEROBIC                                   

 

                BLOOD CULTURE SCREEN 2022 04:31:00 Yo Law Univer

[facility]

 

                BLOOD CULTURE WORKUP 2022 04:31:00 [facility]

 

                GRAM POSITIVE BLOOD 2022 04:31:00 Yo Law [facility]



                PROBE-AEROBIC                                   

 

                URINALYSIS      2022 04:21:00 Yo Law [facility]

 

                URINE CULTURE   2022 04:21:00 Yo Law [facility]

 

                URINALYSIS      2022 04:21:00 Yo Law [facility]

 

                URINE CULTURE   2022 04:21:00 ThanhYo mills [facility]

 

                URINALYSIS      2022 04:21:00 Yo Law [facility]

 

                URINE CULTURE   2022 04:21:00 Yo Law [facility]

 

                COVID-19 (ID NOW RAPID 2022 04:20:00 Yo Law [facility]

[facility] [place]



                TESTING)                                        Medical Branch

 

                LAB ONLY COVID  2022 04:20:00 Yo Law [facility]



                INTERPRETATION                                  [facility]

 

                COVID-19 (ID NOW RAPID 2022 04:20:00 ThanhYo mills Univ

[facility] [place]



                TESTING)                                        Medical Branch

 

                LAB ONLY COVID  2022 04:20:00 Yo Law [facility]

 

                COVID-19 (ID NOW RAPID 2022 04:20:00 Yo Law Univ

[facility] [place]



                TESTING)                                        Medical Branch

 

                LAB ONLY COVID  2022 04:20:00 Yo Law [facility]

 

                CBC WITH DIFF   2022 04:18:00 Yo Law [facility]

 

                PROTHROMBIN TIME / INR 2022 04:18:00 Yo Law [facility]

 

                BASIC METABOLIC PANEL 2022 04:18:00 Yo Law [facility]



                (NA, K, CL, CO2, GLUCOSE,                                 Medica

l Branch



                BUN, CREATININE, CA)                                 

 

                HEPATIC FUNCTION PANEL 2022 04:18:00 Yo Law Univ

[facility] [place]



                ([id number]) (ALB,T.PRO,BILI                                 [facility] 

Branch



                T,BU/BC,ALT,AST,ALK PHOS)                                 

 

                LIPASE          2022 04:18:00 Yo Law [facility]

 

                ACUTE CARE VENOUS BLOOD 2022 04:18:00 Yo Law Uni

[facility] [place]



                GAS                                             [facility]

 

                LACTIC ACID WHOLE BLOOD 2022 04:18:00 Yo Law [facility]

 

                MAGNESIUM       2022 04:18:00 Yo Law [facility]

 

                PHOSPHORUS      2022 04:18:00 Yo Law [facility]

 

                GLYCOSYLATED HEMOGLOBIN 2022 04:18:00 [facility] Law Uni

[facility] [place]



                (A1C)                                           [facility]

 

                PHOSPHORUS      2022 04:18:00 Yo Law [facility]

 

                LIPASE          2022 04:18:00 Yo Law [facility]

 

                MAGNESIUM       2022 04:18:00 Yo Law [facility]

 

                HEPATIC FUNCTION PANEL 2022 04:18:00 Thanh Yo B Univ

[facility] [place]



                ([id number]) (ALB,T.PRO,[facility]                                 Medical 

Branch



                T,BU/BC,ALT,AST,ALK PHOS)                                 

 

                BASIC METABOLIC PANEL 2022 04:18:00 Yo Law [facility]



                (NA, K, CL, CO2, GLUCOSE,                                 Medica

l Branch



                BUN, CREATININE, CA)                                 

 

                ACUTE CARE VENOUS BLOOD 2022 04:18:00 Yo Law Uni

[facility]

 

                CBC WITH DIFF   2022 04:18:00 Yo Law [facility]

 

                GLYCOSYLATED HEMOGLOBIN 2022 04:18:00 oY Law Uni

[facility] [place]



                (A1C)                                           [facility]

 

                PROTHROMBIN TIME / INR 2022 04:18:00 Yo Law [facility]

 

                LACTIC ACID WHOLE BLOOD 2022 04:18:00 Yo Law Uni

[facility]

 

                PHOSPHORUS      2022 04:18:00 Yo Law [facility]

 

                LIPASE          2022 04:18:00 Yo Law [facility]

 

                MAGNESIUM       2022 04:18:00 Yo Law [facility]

 

                HEPATIC FUNCTION PANEL 2022 04:18:00 Yo Law Univ

[facility] [place]



                ([id number]) (ALB,T.PRO,[facility]                                 Medical 

Branch



                T,BU/BC,ALT,AST,ALK PHOS)                                 

 

                BASIC METABOLIC PANEL 2022 04:18:00 Yo Law [facility]



                (NA, K, CL, CO2, GLUCOSE,                                 Medica

l Branch



                BUN, CREATININE, CA)                                 

 

                ACUTE CARE VENOUS BLOOD 2022 04:18:00 Yo Law [facility]

 

                CBC WITH DIFF   2022 04:18:00 Yo Law [facility]

 

                GLYCOSYLATED HEMOGLOBIN 2022 04:18:00 Yo Law B Uni

[facility] [place]



                (A1C)                                           [facility]

 

                PROTHROMBIN TIME / INR 2022 04:18:00 Thanh Yo B [facility]

 

                LACTIC ACID WHOLE BLOOD 2022 04:18:00 Yo Law Uni

[facility] [place] [facility]

 

                EMERGENCY DEPARTMENT 2022 05:01:00 Doctor Unassigned, No U

[facility] 

[place]



                DOCUMENTS                       [facility]

 

                HOSPITAL ADMISSION 2022 05:01:00 Doctor Unassigned, No Uni

[facility]

 

                EMERGENCY DEPARTMENT 2022 05:01:00 Doctor Unassigned, No U

[facility] 

[place]



                DOCUMENTS                       [facility]

 

                HOSPITAL ADMISSION 2022 05:01:00 Doctor Unassigned, No Uni

[facility] of [facility]

 

                EMERGENCY DEPARTMENT 2022 05:01:00 Doctor Unassigned, No U

niversity of 

[facility]

 

                HOSPITAL ADMISSION 2022 05:01:00 Doctor Unassigned, No Uni

[facility] of [facility]

 

                POCT GLUCOSE (AUTOMATED) 2022 19:34:00 EdionRachel llanes  Uni

[facility] [place] [facility]

 

                POCT GLUCOSE (AUTOMATED) 2022 19:34:00 Edionwe, Mercy  Uni

[facility] [place] [facility]

 

                POCT GLUCOSE (AUTOMATED) 2022 16:39:00 Edionwe, Mercy  Uni

[facility] [place] [facility]

 

                POCT GLUCOSE (AUTOMATED) 2022 16:39:00 Edionwe, Mercy  Uni

[facility] [place] [facility]

 

                BASIC METABOLIC PANEL 2022 09:13:00 Tabitha Kaylynn  Univer

[facility] [place]



                (NA, K, CL, CO2, GLUCOSE,                                 Medica

l Branch



                BUN, CREATININE, CA)                                 

 

                BASIC METABOLIC PANEL 2022 09:13:00 Tabitha Kaylynn  Univer

[facility] [place]



                (NA, K, CL, CO2, GLUCOSE,                                 Medica

l Branch



                BUN, CREATININE, CA)                                 

 

                POCT GLUCOSE (AUTOMATED) 2022 01:45:00 EdionRachel llanes  Uni

[facility] [place] [facility]

 

                POCT GLUCOSE (AUTOMATED) 2022 01:45:00 Edionwe, Mercy  Uni

[facility] [place] [facility]

 

                POCT GLUCOSE (AUTOMATED) 2022 21:36:00 Edionwe, Mercy  Uni

[facility]

 

                POCT GLUCOSE (AUTOMATED) 2022 21:36:00 Leo Mercy  Bella

versity [facility]

 

                POCT GLUCOSE (AUTOMATED) 2022 16:45:00 Leo [facility]  [facility]

 

                POCT GLUCOSE (AUTOMATED) 2022 16:45:00 Leo Mercy  Bella

[facility]

 

                PHOSPHORUS      2022 13:17:00 Tabitha [facility]

 

                MAGNESIUM       2022 13:17:00 Tabitha [facility]

 

                BASIC METABOLIC PANEL 2022 13:17:00 Tabitha Kaylynn  Univer

[facility] [place]



                (NA, K, CL, CO2, GLUCOSE,                                 Medica

l Branch



                BUN, CREATININE, CA)                                 

 

                BASIC METABOLIC PANEL 2022 13:17:00 Tabitha Kaylynn  Univer

[facility] [place]



                (NA, K, CL, CO2, GLUCOSE,                                 Medica

l Branch



                BUN, CREATININE, CA)                                 

 

                MAGNESIUM       2022 13:17:00 Tabitha [facility]

 

                PHOSPHORUS      2022 13:17:00 Tabitha [facility]

 

                POCT GLUCOSE (AUTOMATED) 2022 12:42:00 Leo Mercy  Uni

[facility]

 

                POCT GLUCOSE (AUTOMATED) 2022 12:42:00 Rachel Bailey  [facility]ella

[facility]

 

                POCT GLUCOSE (AUTOMATED) 2022 01:08:00 Leo Mercy  Bella

[facility]

 

                POCT GLUCOSE (AUTOMATED) 2022 01:08:00 Rachel Bailey  [facility]ella

[facility]

 

                URINALYSIS      2022 22:52:00 Tabitha [facility]

 

                URINE CULTURE   2022 22:52:00 Tabitha [facility]

 

                URINALYSIS      2022 22:52:00 Tabitha [facility]

 

                URINE CULTURE   2022 22:52:00 Tabitha [facility]

 

                POCT GLUCOSE (AUTOMATED) 2022 21:40:00 Bishoppaposhraddha, Mercy  Uni

versity [facility]

 

                POCT GLUCOSE (AUTOMATED) 2022 21:40:00 Edionwe, Mercy  Uni

[facility] [place] [facility]

 

                POCT GLUCOSE (AUTOMATED) 2022 16:02:00 Edpaposhraddha, Mercy  Uni

[facility] [place] [facility]

 

                POCT GLUCOSE (AUTOMATED) 2022 16:02:00 Edpapowe, Mercy  Uni

[facility] [place] [facility]

 

                POCT GLUCOSE (AUTOMATED) 2022 13:08:00 Edpapowe, Mercy  Uni

[facility] [place] [facility]

 

                POCT GLUCOSE (AUTOMATED) 2022 13:08:00 Edpaposhraddha, Mercy  Uni

[facility]

 

                POCT GLUCOSE (AUTOMATED) 2022 10:58:00 Bishoppaposhraddha, Mercy  Uni

[facility]

 

                POCT GLUCOSE (AUTOMATED) 2022 10:58:00 Leo, Mercy  Plannify

[facility]

 

                LACTIC ACID WHOLE BLOOD 2022 09:47:00 [facility]

 

                LACTIC ACID WHOLE BLOOD 2022 09:47:00 Refugio Moran [facility]

 

                PHOSPHORUS      2022 09:46:00 Refugio Moran [facility]

 

                MAGNESIUM       2022 09:46:00 Refugio Moran [facility]

 

                COMP. METABOLIC PANEL 2022 09:46:00 Refugio Moran [facility]



                ([id number])                                         [facility]

 

                CBC WITH DIFF   2022 09:46:00 [facility]

 

                CBC WITH DIFF   2022 09:46:00 Refugio Moran [facility]

 

                COMP. METABOLIC PANEL 2022 09:46:00 Refugio Moran [facility]



                ([id number])                                         Medical Branch

 

                MAGNESIUM       2022 09:46:00 Refugio Moran [facility]

 

                PHOSPHORUS      2022 09:46:00 Refugio Moran o

[facility]

 

                POCT GLUCOSE (AUTOMATED) 2022 07:47:00 Leo, Mercy  Uni

[facility]

 

                POCT GLUCOSE (AUTOMATED) 2022 07:47:00 Leo [facility]  Uni

[facility]

 

                POCT GLUCOSE (AUTOMATED) 2022 03:40:00 Leo [facility]  Uni

[facility]

 

                POCT GLUCOSE (AUTOMATED) 2022 03:40:00 Leo [facility]  Uni

[facility]

 

                POCT GLUCOSE (AUTOMATED) 2022 00:43:00 Leo Mercy  Uni

[facility]

 

                POCT GLUCOSE (AUTOMATED) 2022 00:43:00 Leo Mercy  Bella

[facility]

 

                BASIC METABOLIC PANEL 2022 23:29:00 Refugio Moran [facility]



                (NA, K, CL, CO2, GLUCOSE,                                 Medica

l Branch



                BUN, CREATININE, CA)                                 

 

                BASIC METABOLIC PANEL 2022 23:29:00 Refugio Moran [facility]



                (NA, K, CL, CO2, GLUCOSE,                                 Medica

l Branch



                BUN, CREATININE, CA)                                 

 

                POCT GLUCOSE (AUTOMATED) 2022 22:28:00 Leo Mercy  Uni

[facility]

 

                POCT GLUCOSE (AUTOMATED) 2022 22:28:00 eLo Mercy  Bella

[facility]

 

                US RETROPERITONEAL 2022 22:27:00 Refugio Moran [facility]



                COMPLETE                                        Medical Branch

 

                US RETROPERITONEAL 2022 22:27:00 Refugio Moran [facility]



                COMPLETE                                        [facility]

 

                CT ABDOMEN PELVIS WO 2022 20:05:00 Refugio Moran [facility]



                CONTRAST                                        [facility]

 

                CT ABDOMEN PELVIS WO 2022 20:05:00 Refugio Moran [facility]



                CONTRAST                                        [facility]

 

                POCT GLUCOSE (AUTOMATED) 2022 19:07:00 Leo Mercy  [employer]

[facility]

 

                POCT GLUCOSE (AUTOMATED) 2022 19:07:00 Leo Swapnay  Uni

[facility] [place] [facility]

 

                POCT GLUCOSE (AUTOMATED) 2022 18:00:00 Leo Mercy  Uni

[facility] [place] [facility]

 

                POCT GLUCOSE (AUTOMATED) 2022 18:00:00 Rachel Bailey  Plann[employer]

[facility]

 

                PROTEIN CREAT RATIO URINE 2022 17:45:00 Corby Peña 

[facility]



                RANDOM                                          [facility]

 

                PROTEIN CREAT RATIO URINE 2022 17:45:00 Corby Peña 

[facility]

 

                POCT GLUCOSE (AUTOMATED) 2022 17:25:00 Leo [facility]  Uni

[facility]

 

                POCT GLUCOSE (AUTOMATED) 2022 17:25:00 Rachel Bailey  [employer]

[facility]

 

                LACTIC ACID WHOLE BLOOD 2022 17:23:00 [facility]

 

                LACTIC ACID WHOLE BLOOD 2022 17:23:00 [facility]

 

                BASIC METABOLIC PANEL 2022 17:22:00 MartyRefugio [facility]



                (NA, K, CL, CO2, GLUCOSE,                                 Medica

l Branch



                BUN, CREATININE, CA)                                 

 

                BASIC METABOLIC PANEL 2022 17:22:00 MartyRefugio [facility]



                (NA, K, CL, CO2, GLUCOSE,                                 Medica

l Branch



                BUN, CREATININE, CA)                                 

 

                POCT GLUCOSE (AUTOMATED) 2022 16:07:00 Leo [facility]cy  Uni

[facility]

 

                POCT GLUCOSE (AUTOMATED) 2022 16:07:00 Leo Mercy  Uni

[facility] [place] [facility]

 

                POCT GLUCOSE (AUTOMATED) 2022 15:04:00 Leo Mercy  Uni

[facility] [place] [facility]

 

                POCT GLUCOSE (AUTOMATED) 2022 15:04:00 Leo Mercy  Uni

versity [facility]

 

                POCT GLUCOSE (AUTOMATED) 2022 13:57:00 Leo Mercy  Uni

[facility]

 

                POCT GLUCOSE (AUTOMATED) 2022 13:57:00 Leo Mercy  [facility]

 

                URINE CULTURE   2022 13:50:00 [facility]

 

                URINE CULTURE   2022 13:50:00 [facility]

 

                LACTIC ACID WHOLE BLOOD 2022 12:57:00 Edsofia [facility]

 

                LACTIC ACID WHOLE BLOOD 2022 12:57:00 Leo [facility]

 

                POCT GLUCOSE (AUTOMATED) 2022 12:55:00 Leo [facility]

 

                POCT GLUCOSE (AUTOMATED) 2022 12:55:00 Rachel Bailey  [facility]

 

                BASIC METABOLIC PANEL 2022 12:51:00 Leo [facility]



                (NA, K, CL, CO2, GLUCOSE,                                 Medica

l Branch



                BUN, CREATININE, CA)                                 

 

                BASIC METABOLIC PANEL 2022 12:51:00 Leo [facility]



                (NA, K, CL, CO2, GLUCOSE,                                 Medica

l Branch



                BUN, CREATININE, CA)                                 

 

                MRSA / MSSA SCREEN BY 2022 10:37:00 Leo [facility]

 

                MRSA / MSSA SCREEN BY 2022 10:37:00 Leo [facility]

 

                URINE DRUG (IMMUNOASSAY) 2022 10:36:00 Rachel Bailey  [facility]



                - COMPREHENSIVE DRUG                                 [facility]



                SCREEN                                          

 

                LACTIC ACID WHOLE BLOOD 2022 10:36:00 Leo [facility]

 

                URINE DRUG (LCMSMS) - 2022 10:36:00 Leo [facility]



                SYNTHETIC OPIATES PANEL                                 [facility]

 

                LACTIC ACID WHOLE BLOOD 2022 10:36:00 Leo [facility]

 

                URINE DRUG (IMMUNOASSAY) 2022 10:36:00 Leo [facility] DRUG                                 [facility]



                SCREEN                                          

 

                URINE DRUG (LCMSMS) - 2022 [facility]:36[facility]



                OPIATES PANEL                                   Medical Branch

 

                PHOSPHORUS      2022 08:58:00 Leo [facility]

 

                MAGNESIUM       2022 08:58:00 [facility]

 

                BETA HYDROXY-BUTYRATE 2022 08:58:00 Ashly Ortega [facility]

 

                BASIC METABOLIC PANEL 2022 08:58:00 [facility]



                (NA, K, CL, CO2, GLUCOSE,                                 Medica

l Branch



                BUN, CREATININE, CA)                                 

 

                BETA HYDROXY-BUTYRATE 2022 08:58:00 Ashly Ortega  [facility]

 

                BASIC METABOLIC PANEL 2022 08:58:00 [facility]



                (NA, K, CL, CO2, GLUCOSE,                                 Medica

l Branch



                BUN, CREATININE, CA)                                 

 

                MAGNESIUM       2022 08:58:00 Leo [facility]

 

                PHOSPHORUS      2022 08:58:00 [facility]

 

                CBC WITH DIFF   2022 06:40:00 [facility]

 

                LACTIC [facility]LOOD [facility]

 

                LA[facility]LOOD 2022 [facility]:40:[facility]

 

                CBC WITH DIFF   2022 06:40:00 [facility]

 

                POCT GLUCOSE (AUTOMATED) 2022 [facility]

 

                POCT GLUCOSE (AUTOMATED) 2022 05:14:00 Leo [facility]

 

                ED BLADDER      2022 04:35:00 Ashly Ortega  [facility]

 

                ED BLADDER      2022 04:35:00 Ashly Ortega  [facility]

 

                POCT GLUCOSE (AUTOMATED) 2022 03:51:00 Soloshraddha, Mercy  [facility]

 

                POCT GLUCOSE (AUTOMATED) 2022 03:51:00 Leo Mercy  [facility]

 

                XR CHEST 1 VW   2022 02:43:07 Ashly Ortega  [facility]

 

                XR ANKLE 3+ VW LEFT 2022 02:43:07 Ashly Ortega  [facility]

 

                XR CHEST 1 VW   2022 02:43:07 Ashly Ortega  [facility]

 

                XR ANKLE 3+ VW LEFT 2022 02:43:07 Ashly Ortega  [facility]

 

                LACTIC ACID WHOLE BLOOD 2022 02:30:00 Ashly Ortega  [facility]

 

                LACTIC ACID WHOLE BLOOD 2022 02:30:00 Ashly Ortega  [facility]

 

                URINALYSIS      2022 01:38:00 Ashly Ortega  [facility]

 

                URINALYSIS      2022 01:38:00 Ashly Ortega  [facility]

 

                POCT GLUCOSE (AUTOMATED) 2022 01:36:00 Ashly Ortega  [facility]

 

                POCT GLUCOSE (AUTOMATED) 2022 01:36:00 Ashly Ortega  [facility]

 

                PHOSPHORUS      2022 01:24:00 Ashly Ortega  [facility]

 

                LIPASE          2022 01:24:00 Ashly Ortega  [facility]

 

                MAGNESIUM       2022 01:24:00 Ashly Ortega  [facility]

 

                TROPONIN I      2022 01:24:00 Ashly Ortega  [facility]

 

                COMP. METABOLIC PANEL 2022 01:24:00 Ashly Ortega  [facility]



                ([id number])                                         Medical Branch

 

                SALICYLATE      2022 01:24:00 Ashly Ortega  [facility]

 

                ETHANOL         2022 01:24:00 Ashly Ortega  [facility]

 

                COMP. METABOLIC PANEL 2022 01:24:00 Ashly Ortega  [facility]



                ([id number])                                         Medical Branch

 

                LIPASE          2022 01:24:00 Ashly Ortega  [facility]

 

                TROPONIN I      2022 01:24:00 Ashly Ortega  [facility]

 

                ETHANOL         2022 01:24:00 Ashly Ortega  [facility]

 

                ACETAMINOPHEN   2022 01:24:00 Ashly Ortega  [facility]

 

                PHOSPHORUS      2022 01:24:00 Ashly Ortega  [facility]

 

                MAGNESIUM       2022 01:24:00 Ashly Ortega  [facility]

 

                CT LUMBAR SPINE WO 2022 01:16:52 Ashly Ortega  [facility]



                CONTRAST                                        [facility]

 

                CT THORACIC SPINE WO 2022 01:16:52 Ashly Ortega  [facility]



                CONTRAST                                        [facility]

 

                CT THORACIC SPINE WO 2022 01:16:52 Ashly Otrega  [facility]



                CONTRAST                                        [facility]

 

                CT LUMBAR SPINE WO 2022 01:16:52 Ashly Ortega  [facility]



                CONTRAST                                        [facility]

 

                CT CERVICAL SPINE WO 2022 01:16:24 Ashly Ortega  [facility]



                CONTRAST                                        [facility]

 

                CT HEAD WO CONTRAST 2022 01:16:24 Ashly Ortega  [facility]

 

                CT HEAD WO CONTRAST 2022 01:16:24 Ashly Ortega  [facility]

 

                CT CERVICAL SPINE WO 2022 01:16:24 Ashly Ortega  [facility]



                CONTRAST                                        [facility]

 

                BLOOD CULTURE SCREEN 2022 00:41:00 Ashly Ortega  [facility]

 

                BLOOD CULTURE SCREEN 2022 00:41:00 Ashly Ortega  [facility]



                                                                [facility] [place]

 

                COVID-19 (ID NOW RAPID 2022 00:30:00 Ashly Ortega  [facility]



                TESTING)                                        Medical Branch

 

                LAB ONLY COVID  2022 00:30:00 Ashly Ortega  [place] [facility]

[facility]                                  Medical Branch

 

                COVID-19 (ID NOW RAPID 2022 00:30:00 Ashly Ortega  [facility]



                TESTING)                                        Medical Branch

 

                LAB ONLY COVID  2022 00:30:00 Ashly Ortega  [place] o

[facility]



                INTERPRETATION                                  [facility]

 

                POCT GLUCOSE (AUTOMATED) 2022 00:05:00 Ashly Ortega  [facility]

 

                POCT GLUCOSE (AUTOMATED) 2022 00:05:00 Ashly Ortega  [facility]

 

                ACUTE CARE VENOUS BLOOD 2022 23:53:00 Ashly Ortega  [facility]



                GAS                                             [facility]

 

                ACUTE CARE VENOUS BLOOD 2022 23:53:00 Ashly Ortega  [facility]

 

                CBC WITH DIFF   2022 23:48:00 Ashly Ortega  [facility]

 

                CBC WITH DIFF   2022 23:48:00 Ashly Ortega  [facility]

 

                LACTIC ACID WHOLE BLOOD 2022 23:47:00 Ashly Ortega  [facility]

 

                LACTIC ACID WHOLE BLOOD 2022 23:47:00 Ashly Ortega  [facility]

 

                HB ECG ROUTINE & RHYTHM 2022 23:31:40 Ashly Ortega  [facility]                                           [facility] [place]

 

                HB ECG ROUTINE & RHYTHM 2022 23:31:40 Ashly Ortega  [facility] [place]

 

                EMERGENCY DEPARTMENT 2022 05:01:00 Doctor Unassigned, No U

[facility] 

[place]



                DOCUMENTS                       Name            Medical Branch

 

                EMERGENCY DEPARTMENT 2022 05:01:00 Doctor Unassigned, No U

[facility] 

[place]



                DOCUMENTS                       Name            Medical Branch

 

                HOSPITAL ADMISSION 2022 05:01:00 Doctor Unassigned, No Uni

[facility]

 

                CT FEMUR LEFT W CONTRAST 2022 02:34:07 SingerMianip Uni

[facility]

 

                CT FEMUR LEFT W CONTRAST 2022 02:34:07 Singer Chris Uni

[facility]

 

                POCT GLUCOSE(AGE >30DAYS) 2022 01:30:00 Chris [facility]

[facility]

 

                POCT GLUCOSE(AGE >30DAYS) 2022 01:30:00 [facility]

[facility]

 

                POCT GLUCOSE (AUTOMATED) 2022 01:28:00 Alondra Santos [facility]

 

                POCT GLUCOSE (AUTOMATED) 2022 01:28:00 Alondra Santos [facility]

 

                POCT GLUCOSE (AUTOMATED) 2022 00:38:00 lAondra Santos [facility]

 

                POCT GLUCOSE (AUTOMATED) 2022 00:38:00 Alondra Santos [facility]

 

                URINALYSIS      2022-07-15 23:58:00 Tom Stephenson [facility]

 

                URINALYSIS      2022-07-15 23:58:00 Tom [facility]

 

                POCT GLUCOSE (AUTOMATED) 2022-07-15 23:26:00 Alondra Santos [facility]

 

                POCT GLUCOSE (AUTOMATED) 2022-07-15 23:26:00 Alondra Santos [facility]

 

                BASIC METABOLIC PANEL 2022-07-15 22:24:00 Alondra Santos [facility]



                (NA, K, CL, CO2, GLUCOSE,                                 Medica

l Branch



                BUN, CREATININE, CA)                                 

 

                CBC WITH DIFF   2022-07-15 22:24:00 Tom [facility]

 

                COVID-19 (ID NOW RAPID 2022-07-15 22:24:00 Alondra Santos [facility]



                [facility]

 

                CBC WITH DIFF   2022-07-15 22:24:00 Tom [facility]

 

                BASIC METABOLIC PANEL 2022-07-15 22:24:00 Kaale, Stephenson [facility]



                (NA, K, CL, CO2, GLUCOSE,                                 Medica

l Branch



                BUN, CREATININE, CA)                                 

 

                COVID-19 (ID NOW RAPID 2022-07-15 22:24:00 Alondra Santos [facility]



                TESTING)                                        Medical Branch

 

                LAB ONLY COVID  2022-07-15 22:24:00 Alondra Santos [facility]



                INTERPRETATION                                  Medical Branch

 

                EMERGENCY SERVICES 2022-07-15 05:01:00 Doctor Unassigned, No Uni

[facility] [place]



                AGREEMENTS AND                  Name            Medical Branch



                AUTHORIZATIONS                                  

 

                EMERGENCY DEPARTMENT 2022-07-15 05:01:00 Doctor Unassigned, No U

[facility]



                DOCUMENTS                       Name            Medical Branch

 

                LIPASE          2022 10:37:00 Radha Fofana [facility]

 

                COMP. METABOLIC PANEL 2022 10:37:00 Radha Fofana [facility]



                ([id number])                                         [facility] [place]

 

                CBC WITH DIFF   2022 10:37:00 Brigido Radha [facility]

 

                AC PANEL 21 + LACTIC ACID 2022 10:37:00 [facility] 

[facility]

 

                CBC WITH DIFF   2022 10:37:00 Radha Fofana [facility]

 

                COMP. METABOLIC PANEL 2022 10:37:00 Radha Fofana [facility]



                ([id number])                                         [facility]

 

                AC PANEL 21 + LACTIC ACID 2022 10:37:00 [facility]ana 

[facility]

 

                LIPASE          2022 10:37:00 Radha Fofana [facility]

 

                URINALYSIS      2022 10:24:00 Brigido Radha [facility]

 

                URINALYSIS      2022 10:24:00 Radha Fofana [facility]

 

                EMERGENCY SERVICES 2022 05:01:00 Doctor Unassigned, No Uni

[facility] [place]



                AGREEMENTS AND                  Name            [facility]



                AUTHORIZATIONS                                  

 

                POCT GLUCOSE (AUTOMATED) 2022 22:53:00 Radha Fofana [facility]

 

                POCT GLUCOSE (AUTOMATED) 2022 22:53:00 Radha Fofana [facility]

 

                POCT GLUCOSE (AUTOMATED) 2022 12:17:00 Radha Fofana Uni

[facility] [place]



                                                                Medical Branch

 

                POCT GLUCOSE (AUTOMATED) 2022 12:17:00 Radha Fofana Uni

[facility] [place]



                                                                Medical Branch

 

                POCT GLUCOSE (AUTOMATED) 2022 04:47:00 Radha Fofana Uni

[facility] [place]



                                                                Medical Branch

 

                POCT GLUCOSE (AUTOMATED) 2022 04:47:00 Radha Fofana Uni

[facility] [place]



                                                                Medical Branch

 

                POCT GLUCOSE (AUTOMATED) 2022 01:43:00 Radha Fofana Uni

[facility] [place]



                                                                Medical Branch

 

                POCT GLUCOSE (AUTOMATED) 2022 01:43:00 Radha Fofana Uni

[facility] [place]



                                                                Medical Branch

 

                POCT GLUCOSE (AUTOMATED) 2022 21:51:00 Radha Fofana Uni

[facility] [place]



                                                                Medical Branch

 

                POCT GLUCOSE (AUTOMATED) 2022 21:51:00 Radha Fofana Uni

[facility] [place]



                                                                Medical Branch

 

                POCT GLUCOSE (AUTOMATED) 2022 17:04:00 Radha Fofana Uni

[facility] [place]



                                                                Medical Branch

 

                POCT GLUCOSE (AUTOMATED) 2022 17:04:00 Radha Fofana Uni

[facility] [place]



                                                                Medical Branch

 

                POCT GLUCOSE (AUTOMATED) 2022 13:09:00 Radha Fofana Uni

[facility] [place]



                                                                Medical Branch

 

                POCT GLUCOSE (AUTOMATED) 2022 13:09:00 Radha Fofana Uni

[facility] [place]



                                                                Medical Branch

 

                POCT GLUCOSE (AUTOMATED) 2022 08:32:00 Radha Fofana Uni

[facility] [place]



                                                                Medical Branch

 

                POCT GLUCOSE (AUTOMATED) 2022 08:32:00 Radha Fofana Uni

[facility] [place]



                                                                Medical Branch

 

                POCT GLUCOSE (AUTOMATED) 2022 05:45:00 Radha Fofana Uni

[facility] [place]



                                                                Medical Branch

 

                POCT GLUCOSE (AUTOMATED) 2022 05:45:00 Radha Fofana Uni

[facility] [place]



                                                                Medical Branch

 

                POCT GLUCOSE (AUTOMATED) 2022 02:43:00 Radha Fofana Uni

[facility] [place]



                                                                Medical Branch

 

                POCT GLUCOSE (AUTOMATED) 2022 02:43:00 Radha Fofana Uni

[facility] [place]



                                                                Medical Branch

 

                POCT GLUCOSE (AUTOMATED) 2022 22:37:00 Radha Fofana

[facility]

 

                POCT GLUCOSE (AUTOMATED) 2022 22:37:00 Radha Fofana

[facility]

 

                POCT GLUCOSE (AUTOMATED) 2022 18:04:00 Radha Fofana

[facility]

 

                POCT GLUCOSE (AUTOMATED) 2022 18:04:00 Radha Fofana

[facility]

 

                BASIC METABOLIC PANEL 2022 14:22:00 Noe Phillips    [facility]



                (NA, K, CL, CO2, GLUCOSE,                                 Medica

l Branch



                BUN, CREATININE, CA)                                 

 

                BASIC METABOLIC PANEL 2022 14:22:00 Tyler Phillipsgar    [facility]

[facility] [place]



                (NA, K, CL, CO2, GLUCOSE,                                 Medica

l Branch



                BUN, CREATININE, CA)                                 

 

                POCT GLUCOSE (AUTOMATED) 2022 13:30:00 Radha Fofana [facility]

 

                POCT GLUCOSE (AUTOMATED) 2022 13:30:00 Radha Fofana

[facility]

 

                CRITICAL CARE   2022 11:11:00 Radha Fofana [facility]

 

                CRITICAL CARE   2022 11:11:00 Radha Fofana [facility]

 

                POCT GLUCOSE (AUTOMATED) 2022 11:01:00 Radha Fofana

[facility]

 

                POCT GLUCOSE (AUTOMATED) 2022 11:01:00 Radha Fofana

[facility]

 

                POCT GLUCOSE (AUTOMATED) 2022 07:19:00 Radha Fofana

[facility]

 

                POCT GLUCOSE (AUTOMATED) 2022 07:19:00 Radha Fofana

[facility]

 

                BASIC METABOLIC PANEL 2022 05:53:00 Radha Fofana [facility]



                (NA, K, CL, CO2, GLUCOSE,                                 Medica

l Branch



                BUN, CREATININE, CA)                                 

 

                BASIC METABOLIC PANEL 2022 05:53:00 Radha Fofana [facility]



                (NA, K, CL, CO2, GLUCOSE,                                 Medica

l Branch



                BUN, CREATININE, CA)                                 

 

                POCT GLUCOSE (AUTOMATED) 2022 04:41:00 Radha Fofana [facility]

 

                POCT GLUCOSE (AUTOMATED) 2022 04:41:00 Radha Fofana [facility]

 

                URINALYSIS      2022 03:59:00 Radha Fofana [facility]

 

                URINE CULTURE   2022 03:59:00 Radha Fofana [facility]

 

                URINALYSIS      2022 03:59:00 Radha Fofana [facility]

 

                URINE CULTURE   2022 03:59:00 Radha Fofana [facility]

 

                POCT GLUCOSE (AUTOMATED) 2022 03:48:00 Radha Fofana [facility]

 

                POCT GLUCOSE (AUTOMATED) 2022 03:48:00 Radha Fofana [facility]

 

                AC PANEL 21 + LACTIC ACID 2022 02:23:00 Radha Fofana [facility]

 

                AC PANEL 21 + LACTIC ACID 2022 02:23:00 Radha Fofana [facility]

 

                LIPASE          2022 02:22:00 Radha Fofana [facility]

 

                COMP. METABOLIC PANEL 2022 02:22:00 Radha Fofana [facility]



                ([id number])                                         [facility]

 

                CBC WITH DIFF   2022 02:22:00 Radha Fofana [facility]

 

                COVID-19 (ID NOW RAPID 2022 02:22:00 Radha Fofana [facility]



                TESTING)                                        [facility]

 

                LAB ONLY COVID  2022 02:22:00 Radha Ffoana [facility]



                INTERPRETATION                                  [facility]

 

                CBC WITH DIFF   2022 02:22:00 Radha Fofana [facility]

 

                COMP. METABOLIC PANEL 2022 02:22:00 Radha Fofana [facility]



                ([id number])                                         Medical Branch

 

                LIPASE          2022 02:22:00 Radha Fofana [facility]

 

                COVID-19 (ID NOW RAPID 2022 02:22:00 Radha Fofana [facility]



                TESTING)                                        Medical Branch

 

                LAB ONLY COVID  2022 02:22:00 Radha Fofana o

f [place]



                INTERPRETATION                                  [facility]

 

                HOSPITAL ADMISSION 2022 05:01:00 Doctor Unassigned, No Uni

[facility]

 

                EMERGENCY DEPARTMENT 2022 05:01:00 Doctor Unassigned, No U

[facility] 

[facility]

 

                HOSPITAL ADMISSION 2022 05:01:00 Doctor Unassigned, No Uni

[facility]

 

                POCT GLUCOSE (AUTOMATED) 2022 17:03:00 Edwardo Lopez   Uni

versity [facility]

 

                POCT GLUCOSE (AUTOMATED) 2022 16:09:00 Edwardo Lopez   [facility]

 

                HEPATIC FUNCTION PANEL 2022 09:01:00 Ashly Lees [facility]



                ([id number]) (ALB,T.PRO,BILI                                 Medical 

Branch



                T,BU/BC,ALT,AST,ALK PHOS)                                 

 

                BASIC METABOLIC PANEL 2022 09:01:00 [facility] 

[facility]



                (NA, K, CL, CO2, GLUCOSE,                                 Medica

l Branch



                BUN, CREATININE, CA)                                 

 

                CBC WITH DIFF   2022 09:01:00 Ashly Lees [facility]

 

                CBC WITH DIFF   2022 09:01:00 Ashly Lees [facility]

 

                BASIC METABOLIC PANEL 2022 09:01:00 [facility] 

[facility]



                (NA, K, CL, CO2, GLUCOSE,                                 Medica

l Branch



                BUN, CREATININE, CA)                                 

 

                HEPATIC FUNCTION PANEL 2022 09:01:00 Ashly Lees [facility]



                ([id number]) (ALB,T.PRO,BILI                                 Medical 

Branch



                T,BU/BC,ALT,AST,ALK PHOS)                                 

 

                POCT GLUCOSE (AUTOMATED) 2022 08:58:00 Edwardo Lopez   Uni

versity [facility]

 

                POCT GLUCOSE (AUTOMATED) 2022 06:53:00 Edwardo Lopez   Uni

versity [facility]

 

                POCT GLUCOSE (AUTOMATED) 2022 05:31:00 Edwardo Loepz   [facility]

 

                POCT GLUCOSE (AUTOMATED) 2022 02:16:00 Edwardo Lopez   [facility]

 

                POCT GLUCOSE (AUTOMATED) 2022 21:50:00 Edwardo Lopez

[facility]

 

                BASIC METABOLIC PANEL 2022 20:45:00 [facility]



                (NA, K, CL, CO2, GLUCOSE,                                 Medica

l Branch



                BUN, CREATININE, CA)                                 

 

                CBC WITH DIFF   2022 20:45:00 [facility]

 

                BASIC METABOLIC PANEL 2022 20:45:00 [facility]



                (NA, K, CL, CO2, GLUCOSE,                                 Medica

l Branch



                BUN, CREATININE, CA)                                 

 

                CBC WITH DIFF   2022 20:45:00 [facility]

 

                POCT GLUCOSE (AUTOMATED) 2022 16:44:00 Edwardo Lopez   [facility]

 

                POCT GLUCOSE (AUTOMATED) 2022 13:41:00 Edwardo Lopez   [facility]

 

                URINE CULTURE   2022 06:41:00 Josse Giles [facility]

 

                URINE CULTURE   2022 06:41:00 Josse Giles [facility]

 

                POCT GLUCOSE (AUTOMATED) 2022 06:10:00 Josse Giles [facility]

 

                CT ABDOMEN PELVIS W 2022 05:08:00 Josse Giles [facility]

 

                CT ABDOMEN PELVIS W 2022 05:08:00 Josse Giles [facility]

 

                URINALYSIS      2022 04:34:00 Josse Giles [facility]

 

                COVID-19 (ID NOW RAPID 2022 04:34:00 Josse Giles [facility]



                TESTING)                                        Medical Branch

 

                LAB ONLY COVID  2022 04:34:00 Josse Giles [facility]

 

                URINALYSIS      2022 04:34:00 Josse Giles [facility]

 

                COVID-19 (ID NOW RAPID 2022 04:34:00 Josse Giles [facility]



                TESTING)                                        [facility] [place]

 

                LAB ONLY COVID  2022 04:34:00 Josse Giles [facility]



                INTERPRETATION                                  [facility]

 

                LIPASE          2022 03:57:00 Josse Giles [facility]

 

                COMP. METABOLIC PANEL 2022 03:57:00 Josse Giles [facility]



                ([id number])                                         [facility]

 

                CBC WITH DIFF   2022 03:57:00 Josse Giles [facility]

 

                CBC WITH DIFF   2022 03:57:00 Josse Giles [facility]

 

                COMP. METABOLIC PANEL 2022 03:57:00 Josse Giles [facility]



                ([id number])                                         [facility]

 

                LIPASE          2022 03:57:00 Josse Giles [facility]

 

                EMERGENCY DEPARTMENT 2022 05:01:00 Doctor Unassigned, No U

[facility]



                DOCUMENTS                       [facility]

 

                HOSPITAL ADMISSION 2022 05:01:00 Doctor Unassigned, No Uni

[facility]

 

                POCT GLUCOSE (AUTOMATED) 2022-06-10 21:48:00 Rachel Bailey  Uni

[facility]

 

                POCT GLUCOSE (AUTOMATED) 2022-06-10 17:03:00 Edsofia, Mercy  Uni

[facility] [place] [facility]

 

                POCT GLUCOSE (AUTOMATED) 2022-06-10 17:03:00 EdionweSwapnay  Uni

[facility]

 

                POCT GLUCOSE (AUTOMATED) 2022-06-10 12:44:00 Edionshraddha Mercy  Uni

[facility] [place] [facility]

 

                POCT GLUCOSE (AUTOMATED) 2022-06-10 12:44:00 EdRachel black  Uni

[facility]

 

                BASIC METABOLIC PANEL 2022-06-10 10:29:00 Edwardo Lopez   [facility]



                (NA, K, CL, CO2, GLUCOSE,                                 Medica

l Branch



                BUN, CREATININE, CA)                                 

 

                CBC WITH DIFF   2022-06-10 10:29:00 [facility]

 

                MAGNESIUM       2022-06-10 10:29:00 [facility]

 

                MAGNESIUM       2022-06-10 10:29:00 [facility]

 

                BASIC METABOLIC PANEL 2022-06-10 10:29:00 Edwardo Lopez   [facility]



                (NA, K, CL, CO2, GLUCOSE,                                 Medica

l Branch



                BUN, CREATININE, CA)                                 

 

                CBC WITH DIFF   2022-06-10 10:29:00 [facility]

 

                POCT GLUCOSE (AUTOMATED) 2022-06-10 10:28:00 Edionwe, Mercy  Uni

[facility] [place] [facility]

 

                POCT GLUCOSE (AUTOMATED) 2022-06-10 10:28:00 Edionwe, Mercy  Uni

[facility] [place] [facility]

 

                POCT GLUCOSE (AUTOMATED) 2022-06-10 05:33:00 Edionwe, Mercy  Uni

[facility] [place]



                                                                [facility] [place]

 

                POCT GLUCOSE (AUTOMATED) 2022-06-10 05:33:00 Edionwe, Mercy  Uni

[facility] [place]



                                                                Medical Branch

 

                POCT GLUCOSE (AUTOMATED) 2022-06-10 04:37:00 Edionwe, Mercy  Uni

[facility] [place]



                                                                Medical [facility]

 

                POCT GLUCOSE (AUTOMATED) 2022-06-10 04:37:00 Edionwe, Mercy  Uni

[facility] [place]



                                                                Medical [facility]

 

                POCT GLUCOSE (AUTOMATED) 2022-06-10 02:29:00 Edionwe, Mercy  Uni

[facility] [place]



                                                                Medical [facility]

 

                POCT GLUCOSE (AUTOMATED) 2022-06-10 02:29:00 Edionwe, Mercy  Uni

[facility] [place]



                                                                Medical [facility]

 

                POCT GLUCOSE (AUTOMATED) 2022-06-10 00:52:00 Edionwe, Mercy  Uni

[facility] [place]



                                                                Medical Branch

 

                POCT GLUCOSE (AUTOMATED) 2022-06-10 00:52:00 Edionwe, Mercy  Uni

[facility] [place]



                                                                Medical [facility]

 

                POCT GLUCOSE (AUTOMATED) 2022 21:52:00 Edionwe, Mercy  Uni

[facility] [place]



                                                                Medical [facility]

 

                POCT GLUCOSE (AUTOMATED) 2022 21:52:00 Edionwe [facility]cy  [facility]

 

                POCT GLUCOSE (AUTOMATED) 2022 16:42:00 Leo, Mercy  Uni

[facility]

 

                POCT GLUCOSE (AUTOMATED) 2022 16:42:00 Leo, Mercy  Uni

[facility]

 

                XR CHEST 1 VW   2022 14:05:00 [facility]

 

                XR SKULL <4 VW  2022 14:05:00 [facility]

 

                XR ABDOMEN 2 VW 2022 14:05:00 [facility]

 

                XR ABDOMEN 2 VW 2022 14:05:00 [facility]

 

                XR CHEST 1 VW   2022 14:05:00 [facility]

 

                XR SKULL <4 VW  2022 14:05:00 [facility]

 

                POCT GLUCOSE (AUTOMATED) 2022 12:47:00 Leo Mercy  [facility]

 

                POCT GLUCOSE (AUTOMATED) 2022 12:47:00 Leo Mercy  Uni

[facility]

 

                POCT GLUCOSE (AUTOMATED) 2022 08:59:00 Leo [facility]cy  [facility]

 

                POCT GLUCOSE (AUTOMATED) 2022 08:59:00 Leo Mercy  [facility]

 

                GLYCOSYLATED HEMOGLOBIN 2022 08:56:00 Refugio Moran [facility]



                (A1C)                                           [facility]

 

                CBC WITH DIFF   2022 08:56:00 [facility]

 

                BASIC METABOLIC PANEL 2022 08:56:00 Refugio Moran [facility]



                (NA, K, CL, CO2, GLUCOSE,                                 Medica

l Branch



                BUN, CREATININE, CA)                                 

 

                MAGNESIUM       2022 08:56:00 Refugio Moran [facility]

 

                PHOSPHORUS      2022 08:56:00 [facility]

 

                PHOSPHORUS      2022 08:56:00 Abdullah, [facility]

 

                MAGNESIUM       2022 08:56:00 [facility]

 

                BASIC METABOLIC PANEL 2022 08:56:00 Refugio Moran [facility]



                (NA, K, CL, CO2, GLUCOSE,                                 Medica

l Branch



                BUN, CREATININE, CA)                                 

 

                CBC WITH DIFF   2022 08:56:00 [facility]

 

                GLYCOSYLATED HEMOGLOBIN 2022 08:56:00 Refugio Moran [facility]



                (A1C)                                           [facility]

 

                POCT GLUCOSE (AUTOMATED) 2022 04:27:00 Leo Mercy  [facility]

[facility]

 

                POCT GLUCOSE (AUTOMATED) 2022 04:27:00 Leo Mercy  [facility]

[facility]

 

                POCT GLUCOSE (AUTOMATED) 2022 01:11:00 Leo Mercy  Un[facility]

[facility]

 

                POCT GLUCOSE (AUTOMATED) 2022 01:11:00 Edsofia Mercy  Uni

[facility]

 

                POCT GLUCOSE (AUTOMATED) 2022 21:02:00 Edsofia, Mercy  Uni

[facility]

 

                POCT GLUCOSE (AUTOMATED) 2022 21:02:00 Edsofia, Mercy  Uni

[facility]

 

                POCT GLUCOSE (AUTOMATED) 2022 16:08:00 Edsofia Mercy  Uni

[facility]

 

                POCT GLUCOSE (AUTOMATED) 2022 16:08:00 Leo Mercy  Un[facility]

[facility]

 

                POCT GLUCOSE (AUTOMATED) 2022 12:39:00 Edsofia, Mercy  Uni

[facility]

 

                POCT GLUCOSE (AUTOMATED) 2022 12:39:00 Leo [employer]

[facility]

 

                LACTIC ACID WHOLE BLOOD 2022 09:25:00 Shelton Cunningham [facility]

 

                CBC WITH DIFF   2022 09:25:00 Leo [facility]

 

                BASIC METABOLIC PANEL 2022 09:25:00 Leo [facility]



                (NA, K, CL, CO2, GLUCOSE,                                 Medica

l Branch



                BUN, CREATININE, CA)                                 

 

                BASIC METABOLIC PANEL 2022 09:25:00 Soloshraddha [facility]



                (NA, K, CL, CO2, GLUCOSE,                                 Medica

l Branch



                BUN, CREATININE, CA)                                 

 

                CBC WITH DIFF   2022 09:25:00 Leo [facility]

 

                LACTIC ACID WHOLE BLOOD 2022 09:25:00 Octavio [facility]

 

                POCT GLUCOSE (AUTOMATED) 2022 08:56:00 Leo [facility]

 

                POCT GLUCOSE (AUTOMATED) 2022 08:56:00 Leo [facility]

 

                POCT GLUCOSE (AUTOMATED) 2022 04:51:00 Leo [facility]

 

                POCT GLUCOSE (AUTOMATED) 2022 04:51:00 Leo [facility]

 

                URINALYSIS      2022 02:47:00 Octavio [facility]

 

                URINALYSIS      2022 02:47:00 Octavio [facility]

 

                POCT GLUCOSE (AUTOMATED) 2022 02:29:00 Shelton Cunningham [facility]

 

                POCT GLUCOSE (AUTOMATED) 2022 02:29:00 Shelton Cunningham [facility]

 

                HB ECG ROUTINE & RHYTHM 2022 00:33:38 Octavio [facility]

 

                HB ECG ROUTINE & RHYTHM 2022 00:33:38 Octavio [facility]

 

                URIN[facility]      2022 00:28:00 [facility]

 

                URINE CULTURE   2022 00:28:00 Shelton Cunningham [facility]

 

                URINALYSIS      2022 00:28:00 [facility]

 

                URINE CULTURE   2022 00:28:00 Octavio [facility]

 

                CBC WITH DIFF   2022 00:25:00 Octavio [facility]

 

                COMP. METABOLIC PANEL 2022 00:25:00 Octavio [facility]



                ([id number])                                         Medical Branch

 

                LIPASE          2022 00:25:00 Octavio [facility]

 

                LACTIC ACID WHOLE BLOOD 2022 00:25:00 Octavio [facility]

 

                ACUTE CARE VENOUS BLOOD 2022 00:25:00 Octavio [facility]

 

                BLOOD CULTURE SCREEN 2022 00:25:00 Octavio [facility]

 

                BLOOD CULTURE SCREEN 2022 00:25:00 Octavio [facility]

 

                LIPASE          2022 00:25:00 Octavio [facility]

 

                COMP. METABOLIC PANEL 2022 00:25:00 Octavio [facility]



                ([id number])                                         [facility]

 

                ACUTE CARE VENOUS BLOOD 2022 00:25:00 Octavio [facility]

 

                CBC WITH DIFF   2022 00:25:00 Octavio [facility]

 

                LACTIC ACID WHOLE BLOOD 2022 00:25:00 Octavio [facility]

 

                EMERGENCY DEPARTMENT 2022 05:01:00 Doctor Unassigned, No U

[facility]



                DOCUMENTS                       [facility]

 

                HOSPITAL ADMISSION 2022 05:01:00 Doctor Unassigned, No Uni

[facility]

 

                CBC WITH DIFF   2022 17:57:00 Alan [facility]

 

                BASIC METABOLIC PANEL 2022 17:57:00 [facility]



                (NA, K, CL, CO2, GLUCOSE,                                 Medica

l Branch



                BUN, CREATININE, CA)                                 

 

                MAGNESIUM       2022 17:57:00 Alan [facility]

 

                MAGNESIUM       2022 17:57:00 [facility]

 

                BASIC METABOLIC PANEL 2022 17:57:00 [facility]



                (NA, K, CL, CO2, GLUCOSE,                                 Medica

l Branch



                BUN, CREATININE, CA)                                 

 

                CBC WITH DIFF   2022 17:57:00 [facility]

 

                POCT GLUCOSE (AUTOMATED) 2022 16:49:00 Terminella, Efrain U

[facility]iversity of 

[facility]

 

                POCT GLUCOSE (AUTOMATED) 2022 16:49:00 Terminella, Efrain U

[facility]iversity of 

[facility]

 

                POCT GLUCOSE (AUTOMATED) 2022 12:56:00 Terminella, Efrain U

[facility]iversity of 

[facility]

 

                POCT GLUCOSE (AUTOMATED) 2022 12:56:00 Terminella, Efrain U

[facility]iversity of 

[facility]

 

                POCT GLUCOSE (AUTOMATED) 2022 09:41:00 Terminella, Efrain U

[facility]iversity of 

[facility]

 

                POCT GLUCOSE (AUTOMATED) 2022 09:41:00 Terminella, Efrain U

[facility]iversity of 

[facility]

 

                POCT GLUCOSE (AUTOMATED) 2022 05:08:00 Terminella, Efrain U

[facility]iversity of 

[facility]

 

                POCT GLUCOSE (AUTOMATED) 2022 05:08:00 Terminella, Efrain U

[facility]iversity of 

[facility]

 

                POCT GLUCOSE (AUTOMATED) 2022 01:26:00 Terminella, Efrain U

[facility]iversity of 

[facility]

 

                POCT GLUCOSE (AUTOMATED) 2022 01:26:00 Terminella, Efrain U

[facility]iversity of 

[facility]

 

                POCT GLUCOSE (AUTOMATED) 2022 21:41:00 Terminella, Efrain U

[facility]iversity of 

[facility]

 

                POCT GLUCOSE (AUTOMATED) 2022 21:41:00 Terminella, Efrain U

[facility]iversity of 

[facility]

 

                POCT GLUCOSE (AUTOMATED) 2022 17:07:00 Terminella, Efrain U

[facility]iversity of 

[facility]

 

                POCT GLUCOSE (AUTOMATED) 2022 17:07:00 Terminella, Efrain U

[facility]iversity of 

[facility]

 

                POCT GLUCOSE (AUTOMATED) 2022 14:01:00 Terminella, Efrain U

niversity of 

[facility]

 

                POCT GLUCOSE (AUTOMATED) 2022 14:01:00 Terminella, Efrain U

[facility]iversity of 

[facility]

 

                POCT GLUCOSE (AUTOMATED) 2022 09:18:00 Terminella, Efrain U

[facility]iversity of 

[facility]

 

                POCT GLUCOSE (AUTOMATED) 2022 09:18:00 Terminella, Efrain U

[facility]iversity of 

[facility]

 

                POCT GLUCOSE (AUTOMATED) 2022 01:43:00 Terminella, Efrain U

niversity of 

[facility]

 

                POCT GLUCOSE (AUTOMATED) 2022 01:43:00 Terminella, Efrain U

niversity of 

[facility]

 

                POCT GLUCOSE (AUTOMATED) 2022 21:24:00 Tobias Hernandez Uni

[facility]

 

                POCT GLUCOSE (AUTOMATED) 2022 21:24:00 AlbdamianamiTobias Uni

[facility]

 

                POCT GLUCOSE (AUTOMATED) 2022 16:25:00 AlbdamianamiTobias Uni

[facility]

 

                POCT GLUCOSE (AUTOMATED) 2022 16:25:00 AlbTobias gupta Uni

[facility]

 

                URINALYSIS      2022 15:46:00 Phillips, [facility]

 

                URINE CULTURE   2022 15:46:00 Phillips [facility]

 

                URINALYSIS      2022 15:46:00 Phillips, [facility]

 

                URINE CULTURE   2022 15:46:00 Phillips, [facility]

 

                POCT GLUCOSE (AUTOMATED) 2022 15:36:00 AlbTobias gupta Uni

[facility]

 

                POCT GLUCOSE (AUTOMATED) 2022 15:36:00 AlbTobias gupta Uni

[facility]

 

                POCT GLUCOSE (AUTOMATED) 2022 14:25:00 AlbTobias gupta

versity [facility]

 

                POCT GLUCOSE (AUTOMATED) 2022 14:25:00 Tobias Hernandez

versity [facility]

 

                BASIC METABOLIC PANEL 2022 13:45:00 Tobias Hernandez [facility]

[facility] [place]



                (NA, K, CL, CO2, GLUCOSE,                                 Medica

l Branch



                BUN, CREATININE, CA)                                 

 

                BASIC METABOLIC PANEL 2022 13:45:00 Tobias Hernandez [facility]

[facility] [place]



                (NA, K, CL, CO2, GLUCOSE,                                 Medica

l Branch



                BUN, CREATININE, CA)                                 

 

                POCT GLUCOSE (AUTOMATED) 2022 13:34:00 Tobias Hernandez Uni

versity [facility]

 

                POCT GLUCOSE (AUTOMATED) 2022 13:34:00 Tobias Hernandez

[facility]

 

                CRITICAL CARE   2022 13:05:05 [facility]

 

                CRITICAL CARE   2022 13:05:05 [facility]

 

                POCT GLUCOSE (AUTOMATED) 2022 12:23:00 [facility]

 

                POCT GLUCOSE (AUTOMATED) 2022 12:23:00 Tobias Hernandez

versity [facility]

 

                POCT GLUCOSE (AUTOMATED) 2022 11:24:00 Tobias Hernandez [facility]

 

                POCT GLUCOSE (AUTOMATED) 2022 11:24:00 Tobias Hernandez

[facility]

 

                POCT GLUCOSE (AUTOMATED) 2022 10:33:00 AlbTobias gupta Uni

versity [facility]

 

                POCT GLUCOSE (AUTOMATED) 2022 10:33:00 Tobias Hernandez

[facility]

 

                BASIC METABOLIC PANEL 2022 09:32:00 Tobias Hernandez [facility]

[facility] [place]



                (NA, K, CL, CO2, GLUCOSE,                                 Medica

l Branch



                BUN, CREATININE, CA)                                 

 

                BASIC METABOLIC PANEL 2022 09:32:00 Tobias Hernandez [facility]

[facility] [place]



                (NA, K, CL, CO2, GLUCOSE,                                 Medica

l Branch



                BUN, CREATININE, CA)                                 

 

                POCT GLUCOSE (AUTOMATED) 2022 09:30:00 AlbTobias gupta Uni

[facility]

 

                POCT GLUCOSE (AUTOMATED) 2022 09:30:00 Albustami, Tobisa Uni

[facility]

 

                POCT GLUCOSE (AUTOMATED) 2022 08:27:00 AlbustamiTobias Uni

[facility]

 

                POCT GLUCOSE (AUTOMATED) 2022 08:27:00 Albustami, Tobias Uni

[facility]

 

                POCT GLUCOSE (AUTOMATED) 2022 07:23:00 Albustami, Tobias Uni

[facility]

 

                POCT GLUCOSE (AUTOMATED) 2022 07:23:00 Albustami, Tobias Uni

[facility]

 

                POCT GLUCOSE (AUTOMATED) 2022 06:22:00 AlbTobias gupta Uni

versity [facility]

 

                POCT GLUCOSE (AUTOMATED) 2022 06:22:00 AlbustamiTobias Uni

versity [facility]

 

                BASIC METABOLIC PANEL 2022 05:30:00 [facility]



                (NA, K, CL, CO2, GLUCOSE,                                 Medica

l Branch



                BUN, CREATININE, CA)                                 

 

                BASIC METABOLIC PANEL 2022 05:30:00 [facility]



                (NA, K, CL, CO2, GLUCOSE,                                 Medica

l Branch



                BUN, CREATININE, CA)                                 

 

                POCT GLUCOSE (AUTOMATED) 2022 05:27:00 AlbdamianamiTobias Uni

[facility]

 

                POCT GLUCOSE (AUTOMATED) 2022 05:27:00 AlbustamiTobias Uni

[facility]

 

                POCT GLUCOSE (AUTOMATED) 2022 04:23:00 AlbustamiTobias Uni

[facility]

 

                POCT GLUCOSE (AUTOMATED) 2022 04:23:00 Albdamianami, Tobias Uni

[facility]

 

                POCT GLUCOSE (AUTOMATED) 2022 03:19:00 AlbdamianamiTobias Uni

[facility]

 

                POCT GLUCOSE (AUTOMATED) 2022 03:19:00 Tobias Hernandez

[facility]

 

                POCT GLUCOSE (AUTOMATED) 2022 02:24:00 AlbTobias gupta

[facility]

 

                POCT GLUCOSE (AUTOMATED) 2022 02:24:00 Tobias Hernandez [facility]

 

                KETONES URINE   2022 01:49:00 [facility]

 

                KETONES URINE   2022 01:49:00 [facility]

 

                BETA HYDROXY-BUTYRATE 2022 01:44:00 Phillips, [facility]

 

                BASIC METABOLIC PANEL 2022 01:44:00 [facility]



                (NA, K, CL, CO2, GLUCOSE,                                 Medica

l Branch



                BUN, CREATININE, CA)                                 

 

                BETA HYDROXY-BUTYRATE 2022 01:44:00 Phillips, [facility]

 

                BASIC METABOLIC PANEL 2022 01:44:00 Albustami, Tobias [facility]

[facility] [place]



                (NA, K, CL, CO2, GLUCOSE,                                 Medica

l Branch



                BUN, CREATININE, CA)                                 

 

                POCT GLUCOSE (AUTOMATED) 2022 01:24:00 Tobias Hernandez

[facility]

 

                POCT GLUCOSE (AUTOMATED) 2022 01:24:00 Tobias Hernandez

[facility]

 

                POCT GLUCOSE (AUTOMATED) 2022 00:18:00 AlbTobias gupta Uni

[facility]

 

                POCT GLUCOSE (AUTOMATED) 2022 00:18:00 Tobias Hernandez [facility]

 

                POCT GLUCOSE (AUTOMATED) 2022 22:56:00 AlbTobias gupta [facility]

[facility]

 

                POCT GLUCOSE (AUTOMATED) 2022 22:56:00 AlbTobias gupta [facility]

 

                BASIC METABOLIC PANEL 2022 22:03:00 Yong Tobias [facility]

[facility] [place]



                (NA, K, CL, CO2, GLUCOSE,                                 Medica

l Branch



                BUN, CREATININE, CA)                                 

 

                BASIC METABOLIC PANEL 2022 22:03:00 [facility]

[facility] [place]



                (NA, K, CL, CO2, GLUCOSE,                                 Medica

l Branch



                BUN, CREATININE, CA)                                 

 

                POCT GLUCOSE (AUTOMATED) 2022 21:50:00 AlbTobias gupta Uni

[facility]

 

                POCT GLUCOSE (AUTOMATED) 2022 21:50:00 AlbustamiTobias Uni

[facility]

 

                POCT GLUCOSE (AUTOMATED) 2022 20:39:00 Albdamianami, Tobias Uni

[facility]

 

                POCT GLUCOSE (AUTOMATED) 2022 20:39:00 AlbdamianamiTobias Uni

[facility]

 

                POCT GLUCOSE (AUTOMATED) 2022 18:58:00 AlbdamianamiTobias Uni

versity [facility]

 

                POCT GLUCOSE (AUTOMATED) 2022 18:58:00 AlbTobias gupta Uni

[facility]

 

                BASIC METABOLIC PANEL 2022 17:46:00 [facility]



                (NA, K, CL, CO2, GLUCOSE,                                 Medica

l Branch



                BUN, CREATININE, CA)                                 

 

                BASIC METABOLIC PANEL 2022 17:46:00 [facility]

[facility] [place]



                (NA, K, CL, CO2, GLUCOSE,                                 Medica

l Branch



                BUN, CREATININE, CA)                                 

 

                POCT GLUCOSE (AUTOMATED) 2022 17:39:00 AlbTobias gupta Uni

versity [facility]

 

                POCT GLUCOSE (AUTOMATED) 2022 17:39:00 AlbdamianamiTobias Uni

[facility]

 

                POCT GLUCOSE (AUTOMATED) 2022 16:22:00 AlbdamianamiTobias Uni

[facility]

 

                POCT GLUCOSE (AUTOMATED) 2022 16:22:00 AlbdamianamiTobias Uni

[facility]

 

                POCT GLUCOSE (AUTOMATED) 2022 15:27:00 AlbdamianamiTobias Uni

[facility]

 

                POCT GLUCOSE (AUTOMATED) 2022 15:27:00 Albustami, Tobias [facility]

 

                POCT GLUCOSE (AUTOMATED) 2022 14:17:00 Albcandy [facility]

 

                POCT GLUCOSE (AUTOMATED) 2022 14:17:00 Yong [facility]

 

                CBC WITHOUT DIFF 2022 [facility]

 

                CBC WITHOUT DIFF 2022 13:33:00 [facility]

 

                POCT GLUCOSE (AUTOMATED) 2022 13:20:00 Albcandy [facility]

 

                POCT GLUCOSE (AUTOMATED) 2022 13:20:00 Yong [facility]

 

                POCT GLUCOSE (AUTOMATED) 2022 12:13:00 Yong [facility]

 

                POCT GLUCOSE (AUTOMATED) 2022 12:13:00 Yong [facility]

 

                XR CHEST 1 VW   2022 11:46:06 [facility]

 

                XR CHEST 1 VW   2022 11:46:06 Yong [facility]

 

                MAGNESIUM       2022 10:54:00 [facility]

 

                MRSA / MSSA SCREEN BY 2022 10:54:00 [facility] Tobias [facility]

[facility] [place]



                PCR, [facility]

 

                BASIC METABOLIC PANEL 2022 10:54:00 Yong Tobias [facility]

[facility] [place]



                (NA, K, CL, CO2, GLUCOSE,                                 Medica

l Branch



                BUN, CREATININE, CA)                                 

 

                OSMOLALITY, SERUM OR 2022 10:54:00 Yong Tobias [facility]



                PLASMA                                          [facility]

 

                PHOSPHORUS      2022 10:54:00 Yong [facility]

 

                BETA HYDROXY-BUTYRATE 2022 10:54:00 Yong [facility]

 

                PHOSPHORUS      2022 10:54:00 Yong [facility]

 

                MAGNESIUM       2022 10:54:00 Yong [facility]

 

                OSMOLALITY, SERUM OR 2022 10:54:00 Tobias Hernandez [facility]



                PLASMA                                          [facility]

 

                BETA HYDROXY-BUTYRATE 2022 10:54:00 Yong [facility]

 

                BASIC METABOLIC PANEL 2022 10:54:00 [facility]

[facility] [place]



                (NA, K, CL, CO2, GLUCOSE,                                 Medica

l Branch



                BUN, CREATININE, CA)                                 

 

                MRSA / MSSA SCREEN BY 2022 10:54:00 [facility] Tobias [facility]

[facility] [place]



                PCR, NARES                                      [facility]

 

                POCT GLUCOSE (AUTOMATED) 2022 10:53:00 Tobias Hernandez [facility]

 

                POCT GLUCOSE (AUTOMATED) 2022 10:53:00 Tobias Hernandez

[facility]

 

                POCT GLUCOSE (AUTOMATED) 2022 08:46:00 Radha Fofana

[facility]

 

                POCT GLUCOSE (AUTOMATED) 2022 08:46:00 Radha Fofana

[facility]

 

                POCT GLUCOSE (AUTOMATED) 2022 07:39:00 Radha Fofana

[facility]

 

                POCT GLUCOSE (AUTOMATED) 2022 07:39:00 Radha Fofana

[facility]

 

                POCT GLUCOSE (AUTOMATED) 2022 07:02:00 Radha Fofana

[facility]

 

                POCT GLUCOSE (AUTOMATED) 2022 07:02:00 Radha Fofana

[facility]

 

                POCT GLUCOSE (AUTOMATED) 2022 06:17:00 Radha Fofana

[facility]

 

                POCT GLUCOSE (AUTOMATED) 2022 06:17:00 Radha Fofana

[facility]

 

                AC PANEL 21 + LACTIC ACID 2022 05:36:00 Radha Fofana

iversity [facility]

 

                AC PANEL 21 + LACTIC ACID 2022 05:36:00 Radha Fofana

[facility]

 

                POCT GLUCOSE (AUTOMATED) 2022 04:58:00 Radha Fofana

[facility]

 

                POCT GLUCOSE (AUTOMATED) 2022 04:58:00 Radha Fofana [facility]

 

                CT ABDOMEN PELVIS W 2022 04:33:00 Radha Fofana [facility]



                CONTRAST                                        [facility]

 

                CT ABDOMEN PELVIS W 2022 04:33:00 Radha Fofana [facility]



                CONTRAST                                        [facility]

 

                COMP. METABOLIC PANEL 2022 04:20:00 Radha Fofana [facility]



                ([id number])                                         [facility]

 

                COMP. METABOLIC PANEL 2022 04:20:00 Radha Fofana [facility]



                ([id number])                                         [facility]

 

                CBC WITH DIFF   2022 03:14:00 Radha Fofana [facility]

 

                BLOOD CULTURE SCREEN 2022 03:14:00 Radha Fofana [facility]

 

                BLOOD CULTURE SCREEN 2022 03:14:00 Radha Fofana [facility]

 

                CBC WITH DIFF   2022 03:14:00 Radha Fofana [facility]

 

                ACTIVATED PARTIAL 2022 03:13:00 Radha Fofana [facility]



                [facility]

 

                PROTHROMBIN TIME / INR 2022 03:13:00 Radha Fofana [facility]

 

                LIPASE          2022 03:13:00 Radha Fofana [facility]

 

                TROPONIN I      2022 03:13:00 Radha Fofana [facility]

 

                N-TERMINAL PRO-BNP 2022 03:13:00 Radha Fofana [facility]

 

                LIPASE          2022 03:13:00 Radha Fofana [facility]

 

                TROPONIN I      2022 03:13:00 Radha Fofana [facility]

 

                PROTHROMBIN TIME / INR 2022 03:13:00 Radha Fofana [facility]

 

                ACTIVATED PARTIAL 2022 03:13:00 Radha Fofana [facility]

 

                N-TERMINAL PRO-BNP 2022 03:13:00 Radha Fofana [facility]

 

                LACTIC ACID WHOLE BLOOD 2022 03:12:00 Radha Fofana [facility]

 

                LACTIC ACID WHOLE BLOOD 2022 03:12:00 Brigido Radha [facility]

 

                EKG-12 LEAD     2022 01:55:16 Doctor Unassigned, No [facility]

[facility]

 

                EKG-12 LEAD     2022 01:55:16 Doctor Unassigned, No [facility]iver

[facility]

 

                EMERGENCY DEPARTMENT 2022 05:01:00 [facility] No [facility]

 

                HOSPITAL ADMISSION 2022 05:01:00 Doctor Unassigned, No Uni

[facility]

 

                BASIC METABOLIC PANEL 2022 09:31:00 [facility]



                (NA, K, CL, CO2, GLUCOSE,                                 Medica

l Branch



                BUN, CREATININE, CA)                                 

 

                CBC WITH DIFF   2022 09:31:00 [facility]

 

                BASIC METABOLIC PANEL 2022 09:31:00 [facility]



                (NA, K, CL, CO2, GLUCOSE,                                 Medica

l Branch



                BUN, CREATININE, CA)                                 

 

                CBC WITH DIFF   2022 09:31:00 [facility]

 

                BASIC METABOLIC PANEL 2022 08:57:00 [facility]



                (NA, K, CL, CO2, GLUCOSE,                                 Medica

l Branch



                BUN, CREATININE, CA)                                 

 

                CBC WITH DIFF   2022 08:57:00 [facility]

 

                BASIC METABOLIC PANEL 2022 08:57:00 [facility]



                (NA, K, CL, CO2, GLUCOSE,                                 Medica

l Branch



                BUN, CREATININE, CA)                                 

 

                CBC WITH DIFF   2022 08:57:00 [facility]

 

                CBC WITH DIFF   2022 10:55:00 Rand Dong  [facility]

 

                COMP. METABOLIC PANEL 2022 10:55:00 Rand Dong  [facility]



                [id number]

 

                N-TERMINAL PRO-BNP 2022 10:55:00 Rand Dong  [facility]

 

                COMP. METABOLIC PANEL 2022 10:55:00 Rand Dong  [facility]



                ([id number])                                         [facility]

 

                CBC WITH DIFF   2022 10:55:00 Letitia reymundo  [facility]

 

                N-TERMINAL PRO-BNP 2022 10:55:00 Rand Dong  [facility]

 

                CBC WITH DIFF   2022 11:33:00 Ashly Lees [facility]

 

                COMP. METABOLIC PANEL 2022 11:33:00 Rand Dong  [facility]



                ([id number])                                         [facility]

 

                N-TERMINAL PRO-BNP 2022 11:33:00 Rand Dong  [facility]

 

                MAGNESIUM       2022 11:33:00 Rand Dong  [facility]

 

                MAGNESIUM       2022 11:33:00 Rand Dong  [facility]

 

                COMP. METABOLIC PANEL 2022 11:33:00 Rand Dong  [facility]



                ([id number])                                         [facility]

 

                CBC WITH DIFF   2022 11:33:00 [facility]

 

                N-TERMINAL PRO-BNP 2022 11:33:00 Rand Dong  [facility]

 

                ASPIRATE OR ABSCESS 2022 21:31:00 Marvin Cernaa   [facility] [place]



                CULTURE(AEROBIC/ANAEROBIC                                 Medica

l Branch



                )                                               

 

                ASPIRATE OR ABSCESS 2022 21:31:00 Nehemiah Coleen   [facility]

[facility] [place]



                CULTURE(AEROBIC/ANAEROBIC                                 Medica

l Branch



                )                                               

 

                PROTEIN CREAT RATIO URINE 2022 11:11:00 [facility]

 

                SODIUM, URINE RANDOM 2022 11:11:00 Rand Dong  [facility]

 

                SODIUM, URINE RANDOM 2022 11:11:00 Rand Dong  [facility]

 

                PROTEIN CREAT RATIO URINE 2022 11:11:00 Rand Dong

[facility]

 

                XR CHEST 1 VW   2022 11:07:43 Rand Dong  [facility]

 

                XR FOOT 3+ VW LEFT 2022 11:07:43 Rand Dong  [facility]

 

                XR CHEST 1 VW   2022 11:07:43 Letitia reymundo  [facility]

 

                XR FOOT 3+ VW LEFT 2022 11:07:43 Rand Dong  [facility]

 

                URINE DRUG (IMMUNOASSAY) 2022 11:07:00 Rand Dong  [facility]



                SCREEN                                          

 

                URINE DRUG (LCMSMS) - 2022 11:07:00 Rand Dong  [facility]



                OPIATES PANEL                                   [facility]

 

                URINE DRUG (IMMUNOASSAY) 2022 11:07:00 Rand Dong  [facility]



                SCREEN                                          

 

                URINE DRUG (LCMSMS) - 2022 11:07:00 Rand Dong  [facility]



                SYNTHETIC OPIATES PANEL                                 [facility] 

Branch

 

                SEDIMENTATION RATE 2022 11:03:00 Rand Dong  [facility]

 

                PROCALCITONIN   2022 11:03:00 Letitia reymundo  [facility]

 

                CBC WITH DIFF   2022 11:03:00 Letitia reymundo  [facility]

 

                COMP. METABOLIC PANEL 2022 11:03:00 Rand Dong  [facility]



                ([id number])                                         [facility]

 

                N-TERMINAL PRO-BNP 2022 11:03:00 Rand Dong  [facility]

 

                MAGNESIUM       2022 11:03:00 Letitia reymundo  [facility]

 

                PHOSPHORUS      2022 11:03:00 Letitia [facility]

 

                CREATINE KINASE 2022 11:03:00 Letitia [facility]

 

                VITAMIN D, 25-OH 2022 11:03:00 Letitia [facility]

 

                VITAMIN B12, LEVEL 2022 11:03:00 Letitia [facility]

 

                GLYCOSYLATED HEMOGLOBIN 2022 11:03:00 Letitia Adnan  [facility]

[facility] [place]



                ([facility])                                           [facility]

 

                PHOSPHORUS      2022 11:03:00 Letitia [facility]

 

                CREATINE KINASE 2022 11:03:00 Letitia reymundo  [facility]

 

                MAGNESIUM       2022 11:03:00 Letitia [facility]

 

                VITAMIN B12, LEVEL 2022 11:03:00 Letitia [facility]

 

                COMP. METABOLIC PANEL 2022 11:03:00 Letitia reymundo  [facility]



                ([id number])                                         [facility]

 

                SEDIMENTATION RATE 2022 11:03:00 Letitia [facility]

 

                CBC WITH DIFF   2022 11:03:00 Letitia reymundo  [facility]

 

                GLYCOSYLATED HEMOGLOBIN 2022 11:03:00 Letitia Adnan  [facility]

[facility] [place]



                ([facility])                                           [facility]

 

                N-TERMINAL PRO-BNP 2022 11:03:00 Letitia [facility]

 

                VITAMIN D, 25-OH 2022 11:03:00 Letitia [facility]

 

                PROCALCITONIN   2022 11:03:00 Letitia [facility]

 

                CT ANGIOGRAM LOWER 2022 03:39:00 Radha Fofana [facility]



                EXTREMITY LEFT W CONTRAST                                 Medica

l Branch

 

                CT ANGIOGRAM LOWER 2022 03:39:00 Radha Fofana [facility]



                EXTREMITY LEFT W CONTRAST                                 Medica

l Branch

 

                CT HEAD WO CONTRAST 2022 03:25:00 Radha Fofana [facility]

 

                CT HEAD WO CONTRAST 2022 03:25:00 Radha Fofana [facility]

 

                URINALYSIS      2022 01:22:00 Radha Fofana [facility]

 

                URINE CULTURE   2022 01:22:00 Radha Fofana [facility]

 

                URINALYSIS      2022 01:22:00 Radha Fofana [facility]

 

                URINE CULTURE   2022 01:22:00 Radha Fofana [facility]

 

                CBC WITH DIFF   2022 00:58:00 Radha Fofana [facility]

 

                ACTIVATED PARTIAL 2022 00:58:00 Radha Fofana [facility]



                [facility]

 

                PROTHROMBIN TIME / INR 2022 00:58:00 Radha Fofana [facility]

 

                COMP. METABOLIC PANEL 2022 00:58:00 Radha Fofana [facility]



                ([id number])                                         [facility]

 

                BLOOD CULTURE SCREEN 2022 00:58:00 Radha Fofana [facility]

 

                LACTIC ACID WHOLE BLOOD 2022 00:58:00 Radha Fofana [facility]

 

                N-TERMINAL PRO-BNP 2022 00:58:00 Rand Dong  [facility]

 

                MAGNESIUM       2022 00:58:00 Rand Dong  [facility]

 

                PHOSPHORUS      2022 00:58:00 Rand Dong  [facility]

 

                FERRITIN SERUM  2022 00:58:00 Letitia reymundo  [facility]

 

                IRON PANEL      2022 00:58:00 Rand Dong  [facility]

 

                THYROID STIMULATING 2022 00:58:00 Rand Dong  [facility]



                HORMONE                                         [facility]

 

                LIPID PANEL ([id number])(TOTAL 2022 00:58:00 Rand Dong  [facility]

[facility]



                CHOLESTEROL,                                    Medical Branch



                TRIGLYCERIDES, HDL)                                 

 

                BLOOD CULTURE SCREEN 2022 00:58:00 Radha Fofana [facility] [place] [facility]

[facility]



                                                                [facility] [place]

 

                PHOSPHORUS      2022 00:58:00 Rand Dong  [facility]



                                                                [facility] [place]

 

                MAGNESIUM       2022 00:58:00 Letitia [facility]

 

                FERRITIN SERUM  2022 00:58:00 Letitia reymundo  [facility]

 

                THYROID STIMULATING 2022 00:58:00 [facility]



                HORMONE                                         [facility]

 

                COMP. METABOLIC PANEL 2022 00:58:00 Radha Fofana [facility]



                ([id number])                                         Medical Branch

 

                LIPID PANEL ([id number])(TOTAL 2022 00:58:00 Rand Dong  

[facility]



                CHOLESTEROL,                                    Medical Branch



                TRIGLYCERIDES, HDL)                                 

 

                IRON PANEL      2022 00:58:00 Rand Dong  [facility]

 

                CBC WITH DIFF   2022 00:58:00 Radha Fofana [facility]

 

                PROTHROMBIN TIME / INR 2022 00:58:00 Radha Fofana [facility]

 

                ACTIVATED PARTIAL 2022 00:58:00 Radha Fofana [facility]



                THRMPLAS [facility]

 

                N-TERMINAL PRO-BNP 2022 00:58:00 Rand Dong  [facility]

 

                LACTIC ACID WHOLE BLOOD 2022 00:58:00 Radha Fofana [facility]

[facility]

 

                EMERGENCY DEPARTMENT 2022-05-10 05:01:00 Doctor Unassigned, No [facility]

[facility] 

[place]



                DOCUMENTS                       [facility]

 

                HOSPITAL ADM - MIS 2022-05-10 05:01:00 Doctor Unassigned, No Un

iversity of 

[facility]

 

                HOSPITAL ADMISSION 2022-05-10 05:01:00 Doctor Unassigned, No Uni

versity of [facility]

 

                EMERGENCY DEPARTMENT 2022-05-10 05:01:00 Doctor Unassigned, No [facility]

[facility] 

[place]



                DOCUMENTS                       [facility]

 

                HOSPITAL ADM - [facility] 2022-05-10 05:01:00 Doctor Unassigned, No [facility]

[facility]



                                                Name            Medical Branch

 

                Spinal puncture, lumbar, 2022 20:30:00                 Patience Garcia



                diagnostic; with                                 



                fluoroscopic or CT                                 



                guidance                                        

 

                MAGNESIUM       2022 04:12:00 [facility]

 

                COMP. METABOLIC PANEL 2022 04:12:00 [facility]



                ([id number])                                         [facility]

 

                CBC WITH DIFF   2022 04:12:00 [facility]

 

                CT HEAD WO CONTRAST 2022 00:15:29 ALLISON Romeo [facility]

 

                URINALYSIS      2022 23:53:00 ALLISON Romeo Elena [facility]

 

                COMP. METABOLIC PANEL 2022 23:52:00 ALLISON Romeo Elena [facility]

[facility] [place]



                ([id number])                                         [facility]

 

                CBC WITH DIFF   2022 23:52:00 ALLISON Romeo Elena [facility]

 

                XR CHEST 1 VW   2022 03:03:32 Rand Dong  [facility]

 

                COMP. METABOLIC PANEL 2022 11:57:00 [facility]



                ([id number])                                         [facility]

 

                CBC WITH DIFF   2022 11:57:00 [facility]

 

                BASIC METABOLIC PANEL 2022 12:10:00 [facility]



                (NA, K, CL, CO2, GLUCOSE,                                 Medica

l Branch



                BUN, CREATININE, CA)                                 

 

                CBC WITH DIFF   2022 [facility]:09[facility]00 [facility]

 

                URINALYSIS      2022 00:40:00 Suly Luna [facility]

 

                URINE CULTURE   2022 00:40:00 Suly Luna [facility]

 

                FECES CULTURE   2022 14:58:00 Leo [facility]

 

                OCCULT (GUAIAC) BLOOD 2022 14:58:00 Rachel Bailey  [facility]

 

                CLOSTRIDIUM DIFFICILE 2022 14:58:00 Leo [facility]



                TOXIN                                           [facility]

 

                FECAL PATHOGENS BY PCR 2022 14:58:00 Rachel Bailey  [facility]

 

                URINE DRUG (IMMUNOASSAY) 2022 10:11:00 Rachel Bailey  [facility]



                - COMPREHENSIVE DRUG                                 Medical [facility]



                SCREEN                                          

 

                COVID-19 (ID NOW RAPID 2022 07:33:00 Ashley Garay [facility]



                TESTING)                                        Medical Branch

 

                LAB ONLY COVID  2022 07:33:00 Ashley Garay [facility]



                INTERPRETATION                                  [facility]

 

                COMP. METABOLIC PANEL 2022 06:18:00 Ashley Garay [facility]



                ([id number])                                         [facility]

 

                CBC WITH DIFF   2022 05:40:00 Ashley Garay [facility]

 

                URINALYSIS      2022 01:43:00 Alma Villaseñor [facility]

 

                LIPASE          2022 01:40:00 Alma Villaseñor [facility]

 

                COMP. METABOLIC PANEL 2022 01:40:00 Alma Villaseñor [facility]



                ([id number])                                         [facility]

 

                CBC WITH DIFF   2022 01:38:00 Alma Villaseñor [facility]

 

                XR CHEST 1 VW   2022 23:40:19 Alma Villaseñor [facility]

 

                ASSIGNMENT OF BENEFITS 2022 22:05:40 Doctor Unassigned, No

 [facility]

 

                NOTICE OF PRIVACY 2022 22:04:58 Doctor Unassigned, No [facility]



                PRACTICES                       Name            [facility]

 

                CONSENT/REFUSAL FOR 2022 22:04:33 Doctor Unassigned, No [facility]



                DIAGNOSIS AND TREATMENT                 [facility]

 

                URINALYSIS      2022-01-15 23:09:00 Neeta Maria [facility]

 

                BLOOD CULTURE SCREEN 2022-01-15 23:04:00 Neeta Maria [facility]

 

                D-DIMER         2022-01-15 22:49:00 Neeta Maria [facility]

 

                COVID-19 (ID NOW RAPID 2022-01-15 22:48:00 Neeta Maria [facility]

[facility] [place]



                TESTING)                                        [facility]

 

                COMP. METABOLIC PANEL 2022-01-15 22:17:00 Neeta Maria [facility]

[facility] [place]



                ([id number])                                         Medical Branch

 

                CBC WITH DIFF   2022-01-15 22:17:00 Neeta Maria [facility]

 

                XR CHEST 1 VW   2022-01-15 21:47:19 Neeta Maria [facility]

 

                COMP. METABOLIC PANEL 2021 22:20:00 Chris Sol [facility]

[facility] [place]



                ([id number])                                         [facility]

 

                CBC WITH DIFF   2021 22:20:00 Chris Sol [place]

[facility]

 

                CT ABDOMEN PELVIS WO 2021-05-10 00:39:40 IbKodi cantuusho F Uni

[facility] [place]



                CONTRAST                                        Medical Branch

 

                LIPASE          2021-05-10 00:06:00 Ibikunoumar Folusho F [facility]

 

                COMP. METABOLIC PANEL 2021-05-10 00:06:00 Daisy Folusho F Un

[facility]



                ([id number])                                         [facility]

 

                CBC WITH DIFF   2021-05-10 00:06:00 Ibikunoumar Folusho F [facility]

 

                URINALYSIS      2021-05-10 00:00:00 Ibsalvadorunoumar Folusho F [facility]

 

                COVID-19 (ID NOW RAPID 2021-05-10 00:00:00 Ibikunle Folusho F [facility]

[facility]



                TESTING)                                        Medical [facility]

 

                Cholecystectomy                                 [facility]

 

                Shunt of cerebral                                 [facility]

nn



                ventricle to extracranial                                 



                site                                            







Plan of Care







             Planned Activity Planned Date Details      Comments     Source

 

             Future Scheduled 2023-01-10   Lipid panel               [facility]

s



             Test         00:00:00     (procedure) [code =              [facility]



                                       [id number]]                 

 

             Future Scheduled 2023-01-10   Lipid panel               [facility]

s



             Test         00:00:00     (procedure) [code =              [facility]



                                       [id number]]                 

 

             Future Scheduled 2023-01-10   Lipid panel               [facility]

s



             Test         00:00:00     (procedure) [code =              [facility] 

Center



                                       [id number]]                 

 

             Future Scheduled 2023-01-10   Lipid panel               [facility]

s



             Test         00:00:00     (procedure) [code =              [facility] 

Center



                                       [id number]]                 

 

             Future Scheduled 2022   INFLUENZA VACCINE (#1)              [facility]

[facility]



             Test         00:00:00     [code = INFLUENZA              Medical Ce

nter



                                       VACCINE (#1)]              

 

             Future Scheduled 2022   INFLUENZA VACCINE (#1)              C

[facility]



             Test         00:00:00     [code = INFLUENZA              Medical Ce

nter



                                       VACCINE (#1)]              

 

             Future Scheduled 2022   INFLUENZA VACCINE (#1)              C

[facility]



             Test         00:00:00     [code = INFLUENZA              Medical Ce

nter



                                       VACCINE (#1)]              

 

             Future Scheduled 2022   DEPRESSION SCREENING              [facility]



             Test         00:00:00     (12+) [code =              [facility] Center



                                       DEPRESSION SCREENING              



                                       (12+)]                    

 

             Future Scheduled 2022   DEPRESSION SCREENING              [facility]



             Test         00:00:00     (12+) [code =              Medical Center



                                       DEPRESSION SCREENING              



                                       (12+)]                    

 

             Future Scheduled 2022   DEPRESSION SCREENING              [facility]



             Test         00:00:00     (12+) [code =              Medical Center



                                       DEPRESSION SCREENING              



                                       (12+)]                    

 

             Future Scheduled 2021   INFLUENZA VACCINE              [facility]



             Test         00:00:00     (Season Ended) [code =              [facility]



                                       INFLUENZA VACCINE              



                                       (Season Ended)]              

 

             Future Scheduled 2021   DEPRESSION SCREENING              [facility]



             Test         00:00:00     (12+) [code =              Medical Center



                                       DEPRESSION SCREENING              



                                       (12+)]                    

 

             Future Scheduled 2020-04-10   Hemoglobin A1c              [facility]



             Test         00:00:00     measurement               Medical Center



                                       (procedure) [code =              



                                       32363230]                 

 

             Future Scheduled 2020-04-10   Hemoglobin A1c              [facility]

[facility]



             Test         00:00:00     measurement               Medical Center



                                       (procedure) [code =              



                                       63171218]                 

 

             Future Scheduled 2020-04-10   Hemoglobin A1c              [facility]

[facility]



             Test         00:00:00     measurement               Medical Center



                                       (procedure) [code =              



                                       47676885]                 

 

             Future Scheduled 2020-04-10   Hemoglobin A1c              [facility]



             Test         00:00:00     measurement               Medical Center



                                       (procedure) [code =              



                                       76240232]                 

 

             Future Scheduled 2004   DTAP/TDAP/TD VACCINES              [facility]



             Test         00:00:00     (1 - Tdap) [code =              Medical C

enter



                                       DTAP/TDAP/TD VACCINES              



                                       (1 - Tdap)]               

 

             Future Scheduled 2004   DTAP/TDAP/TD VACCINES              [facility]



             Test         00:00:00     (1 - Tdap) [code =              Medical C

enter



                                       DTAP/TDAP/TD VACCINES              



                                       (1 - Tdap)]               

 

             Future Scheduled 2004   DTAP/TDAP/TD VACCINES              [facility]



             Test         00:00:00     (1 - Tdap) [code =              Medical C

enter



                                       DTAP/TDAP/TD VACCINES              



                                       (1 - Tdap)]               

 

             Future Scheduled 2004   DTAP/TDAP/TD VACCINES              [facility]



             Test         00:00:00     (1 - Tdap) [code =              Medical C

enter



                                       DTAP/TDAP/TD VACCINES              



                                       (1 - Tdap)]               

 

             Future Scheduled 2003   HEPATITIS C SCREENING              [facility]



             Test         00:00:00     [code = HEPATITIS C              Medical 

Center



                                       SCREENING]                

 

             Future Scheduled 2003   HEPATITIS C SCREENING              [facility]



             Test         00:00:00     [code = HEPATITIS C              Medical 

Center



                                       SCREENING]                

 

             Future Scheduled 2003   HEPATITIS C SCREENING              [facility]



             Test         00:00:00     [code = HEPATITIS C              Medical 

Center



                                       SCREENING]                

 

             Future Scheduled 2003   HEPATITIS C SCREENING              [facility]



             Test         00:00:00     [code = HEPATITIS C              Medical 

Center



                                       SCREENING]                

 

             Future Scheduled 1997   COVID-19 VACCINE (1)              [facility]



             Test         00:00:00     [code = COVID-19              Medical Abilio

ter



                                       VACCINE (1)]              

 

             Future Scheduled 1997   Tobacco Cessation              [facility]



             Test         00:00:00     Counseling and              Medical Cente

r



                                       Screening (12+) [code              



                                       = Tobacco Cessation              



                                       Counseling and              



                                       Screening (12+)]              

 

             Future Scheduled 1997   Tobacco Cessation              [facility]



             Test         00:00:00     Counseling and              Medical Cente

r



                                       Screening (12+) [code              



                                       = Tobacco Cessation              



                                       Counseling and              



                                       Screening (12+)]              

 

             Future Scheduled 1995   DIABETIC EYE EXAM              [facility]



             Test         00:00:00     [code = DIABETIC EYE              Medical

 Center



                                       EXAM]                     

 

             Future Scheduled 1995   Urine screening for              [facility]



             Test         00:00:00     protein (procedure)              [facility] 

Center



                                       [code = 995719559]              

 

             Future Scheduled 1995   DIABETIC EYE EXAM              [facility]



             Test         00:00:00     [code = DIABETIC EYE              Medical

 Center



                                       EXAM]                     

 

             Future Scheduled 1995   Urine screening for              [facility]



             Test         00:00:00     protein (procedure)              Medical 

Center



                                       [code = 005405758]              

 

             Future Scheduled 1995   DIABETIC EYE EXAM              [facility]



             Test         00:00:00     [code = DIABETIC EYE              Medical

 Center



                                       EXAM]                     

 

             Future Scheduled 1995   Urine screening for              [facility]



             Test         00:00:00     protein (procedure)              Medical 

Center



                                       [code = 448852456]              

 

             Future Scheduled 1995   DIABETIC EYE EXAM              [facility]



             Test         00:00:00     [code = DIABETIC EYE              Medical

 Center



                                       EXAM]                     

 

             Future Scheduled 1995   Urine screening for              [facility]



             Test         00:00:00     protein (procedure)              Medical 

Center



                                       [code = 807544986]              

 

             Future Scheduled 1991   PNEUMOCOCCAL VACCINE              [facility]



             Test         00:00:00     0-64 YRS (1 of 1 -              Medical C

enter



                                       PPSV23) [code =              



                                       PNEUMOCOCCAL VACCINE              



                                       0-64 YRS (1 of 1 -              



                                       PPSV23)]                  

 

             Future Scheduled 1991   PNEUMOCOCCAL VACCINE              [facility]



             Test         00:00:00     0-64 YRS (1 - PCV)              Medical C

enter



                                       [code = PNEUMOCOCCAL              



                                       VACCINE 0-64 YRS (1 -              



                                       PCV)]                     

 

             Future Scheduled 1991   PNEUMOCOCCAL VACCINE              [facility]



             Test         00:00:00     0-64 YRS (1 - PCV)              Medical C

enter



                                       [code = PNEUMOCOCCAL              



                                       VACCINE 0-64 YRS (1 -              



                                       PCV)]                     

 

             Future Scheduled 1991   PNEUMOCOCCAL VACCINE              [facility]



             Test         00:00:00     0-64 YRS (1 - PCV)              Medical C

enter



                                       [code = PNEUMOCOCCAL              



                                       VACCINE 0-64 YRS (1 -              



                                       PCV)]                     

 

             Future Scheduled 1986   COVID-19 VACCINE (#1)              [facility]



             Test         00:00:00     [code = COVID-19              Medical Abilio

ter



                                       VACCINE (#1)]              

 

             Future Scheduled 1986   COVID-19 VACCINE (#1)              [facility]



             Test         00:00:00     [code = COVID-19              Medical Abilio

ter



                                       VACCINE (#1)]              

 

             Future Scheduled 1986   COVID-19 VACCINE (#1)              [facility]



             Test         00:00:00     [code = COVID-19              Medical Abilio

ter



                                       VACCINE (#1)]              







Encounters







        Start   End     Encounter Admission Attending Care    Care    Encounter 

Source



        Date/Time Date/Time Type    Type    Clinicians Facility Department ID   

   

 

        2022         Outpatient                 [facility]     -4040

 [place]



        10:51:49                                                 1212    [facility]

 

        2022         Outpatient                 [facility]     -[id number]

 [place]



        08:22:27                                                 0411    [facility]

 

        2022         Outpatient                 [facility]     -8514

 [place]



        11:10:01                                                 [id number]    [facility]

 

        2023 Outpatient R       MARY ANNE RAMIREZ [facility]    [id number]

308 Univers



        13:00:00 13:00:00                 MARY ANNE RAMIREZ                         [facility]

 

        2022 Outpatient R       LINO [facility]    [id number]

[id number] Univers



        11:30:00 11:30:00                 [facility]

 

        2022 Outpatient R       MARY ANNE RAMIREZ [facility]    [id number]

401 Univers



        13:00:00 13:39:07                 MARY ANNE RAMIREZ                         [facility]

 

        2022 Technician         Lab, Ang - Db [facility]    1.2.840.1

14 [id number] 

Univers



        12:45:00 13:00:00 Visit           Mary Anne Ramirez [facility]  350.1.13.10         it

y of



                                                [place] 4.2.7.2.686         Eric

as



                                                HÉCTOR?BLEA [phone]         [address]



                                                MEDICAL                 



                                                OFFICE                  



                                                BUILDING                 

 

        2022 Outpatient X       JOHN [facility]    KRISH     [id number]

754 Univers



        16:26:00 17:36:00                 EDWARDO                           [facility]

 

        2022 Emergency         Neeta Maria [facility]    1.2.840

.114 [id number] 

Univers



        16:26:00 17:36:00                 Edwardo Lopez 350.1.13.10    

     [facility] [id number]         [place]

[place]



                                                [place]  [phone]         [address]

 

        2022-10-31 2022-10-31 Emergency X       KASI [facility]    [place]     [id number]

47 Univers



        20:12:00 22:15:00                 ALMA                          ity [facility]

 

        2022-10-31 2022-10-31 Emergency         Cacace, [facility]    [id number]

[id number] Univers



        20:12:00 22:15:00                 Alma MCCARTHY 350.1.13.10         

ity of



                                                [place] [id number]         [place]

[place]



                                                CAMPUS  [phone]         Medi

sarah



                                                        [address]             [place]

 

        2022-10-29 2022-10-29 Emergency X       [facility], [facility]    ERT     [id number]

67 Univers



        19:14:00 22:40:00                 FRANKOLI                          ity [facility]

 

        2022-10-29 2022-10-29 Emergency         [facility]    [id number]

9139 Univers



        19:14:00 22:40:00                 Ashley MCCARTHY 350.1.13.10         

ity of



                                                [facility] 4.2.7.2.686         [place]

[place]



                                                [place]  [phone]         Medi

sarah



                                                        084             [place]

 

        2022-10-25 2022-10-25 Transition         IMTIAZ Guzman  1.2.840.114 [id number]

[id number] Univers



        00:00:00 00:00:00 of Care         Dulce DUNN   350.1.13.10        

 ity of



                                                [place]   4.2.7.2.686         [place]

[place]



                                                        [phone]         Medi

sarah



                                                        403             [place]

 

        2022-10-21 2022-10-23 Inpatient X       LEO [facility]    KR[place]     [id number]

387 Univers



        00:19:00 12:45:00                 RACHEL melton [facility]

 

        2022-10-21 2022-10-23 [facility]         Yoni Mariscal [facility]    1.2.8

40.114 [id number] 

Univers



        00:19:00 12:45:00 Encounter         Rachel BaileyCIARAN 350.1.13.10 

        ity of



                                                [place] 4.2.7.2.686         [place]

[place]



                                                [place]  [phone]         [address]

 

        2022-10-18 2022-10-18 Outpatient R       VAMSHI [facility]    [id number]

[id number] Univers



        08:00:00 08:00:00                 SAPPHIRE                         ity

 [facility]

 

        2022-10-10 2022-10-10 Outpatient R       SOFIAHEATHER [facility]    [id number]

[id number] Univers



        08:00:00 08:00:00                 SAPPHIRE                         ity

 [facility]

 

        2022 Outpatient R       MARY ANNE RAMIREZ [facility]    [id number]

271 Univers



        14:00:00 14:00:00                 MARY ANNE RAMIREZ                         ity [facility]

 

        2022 Outpatient R       RAKANSALOME [facility]    [id number]

[id number] Univers



        08:30:00 08:30:00                 SAPPHIRE                         ity

 [facility]

 

        2022 Outpatient R       VAMSHI [facility]    [id number]

[id number] Univers



        08:00:00 08:00:00                 SAPPHIRE                         ity

 [facility]

 

        [facility]22 [facility]    [id number]

[address]135 Univers



        11:30:00 11:30:00                 SAPPHIRE                         ity

 [facility]

 

        2022 Telephone         EDEN Helms    1.2.840.114 [id number]

[id number] Univers



        00:00:00 00:00:00                 Yessica BROWN   350.1.13.10         i

ty of



                                                HOSPITAL 4.2.7.2.686         Eric

as



                                                        [phone]         Medi

sarah



                                                        025             [place]

 

        2022 Telephone         Yoselyn   [facility]    [id number]

[id number] Univers



        00:00:00 00:00:00                 Jessi KEARNSPEC 350.1.13.10       

  ity of



                                                [place]   4.2.7.2.686         [place]

[place]



                                                [place]  [phone]         Medi

sarah



                                                [place] [address]             [place]



                                                DIABETES                 



                                                CLINIC                  

 

        2022-08-15 2022-08-15 Transition         IMTIAZ Rodney  1.2.840.114 [id number]

[id number] Univers



        00:00:00 00:00:00 of Care         Stephany DUNN   350.1.13.10         i

ty of



                                                [place]   4.2.7.2.686         [place]

[place]



                                                        [phone]         Medi

sarah



                                                        403             [place]

 

        2022 Inpatient SCOOBY HUTTON [facility]     [id number]

[id number] Univers



        22:57:00 15:56:00                                                 ity of



                                                                        [facility]

 

        2022 Hospital         Yo LawNIE  1.2.840.

114 [id number] 

Univers



        22:57:00 15:56:00 Encounter         Keenan Uribe STEPHANIE   350.1

.13.10         ity of



                                        [facility] 4.2.7.2.686     

    [place]



                                        Phillips, Willieal G         [phone]     

    Medical



                                        [place]         [address]           

  [place]

 

        2022 Anesthesia         Sheridan Delarosa  1.2.84

0.114 [id number] 

Univers



        11:25:00 12:55:00 Event           [place]   350.1.13.10    

     ity of



                                                HOSPITAL 4.2.7.2.686         Eric

as



                                                        [phone]         Medi

sarah



                                                        103             [place]

 

        2022 Surgery         DAVID Bonds  1.2.580.278 [id number]

3251 Univers



        10:12:00 11:55:00                 Sapphire STEPHANIE   350.1.13.10        

 ity of



                                        [facility] 4.2.7.2.686         Eric

as



                                                        [phone]         Medi

sarah



                                                        103             [place]

 

        2022 Travel                  1.2.840.1 1.2.012.219 [id number]

[id number] Univers



        00:00:00 00:00:00                         04746.1.1 350.1.13.10         

ity of



                                                3.104.2.7 4.2.7.3.698         [place]

[place]



                                                .[id number]           Medica

l



                                                .8                      [place]

 

        2022 Transition         Guzman,  1.2.840.6 [id number]

[id number] Univers



        00:00:00 00:00:00 of Care         Dulce 92877.1.1                 i

ty of



                                                3.104.2.7                 [place]



                                                .[phone]                 Medica

l



                                                .8                      [place]

 

        2022 Inpatient X       TABITHA [facility]     [id number]

04 Univers



        18:31:00 18:27:00                 KAYLYNN                          ity of



                                                                        [facility]

 

        2022 [facility]         Ashly Ortega 1.2.840.1 [id number]

[id number] 

Univers



        18:31:00 18:27:00 Encounter         Rachel Bailey 42522.1.1            

     ity of



                                        Kaylynn Lu 3.104.2.7                

 [place]



                                                .[phone]                 Medica

l



                                                .8                      [place]

 

        [facility]    [id number]

[id number] Univers



        13:00:00 13:00:00                 RADHA melton o

f



                                                                        [facility]

 

        2022 Travel                  1.2.840.1 1.2.049.956 [id number]

[id number] Univers



        00:00:00 00:00:00                         19671.1.1 350.1.13.10         

ity of



                                                3.104.2.7 4.2.7.3.698         [place]

[place]



                                                .[phone]           Medica

l



                                                .8                      [place]

 

        2022-07-15 2022-07-15 Emergency X       SINGER, [facility]    ERT     [id number]

93 Univers



        17:06:00 23:29:00                 CHRIS                         ity [facility]

 

        2022-07-15 2022-07-15 Emergency         Alondra Santos 1.2.840.1 [id number]

[id number]

                                        Univers



        17:06:00 23:29:00                 Chris Sol 21513.1.1             

    ity of



                                                3.104.2.7                 [place]



                                                .3.918770                 Medica

l



                                                .8                      [place]

 

        2022-07-15 2022-07-15 Travel                  1.2.840.1 1.2.734.979 4342

2220 Univers



        00:00:00 00:00:00                         67651.1.1 350.1.13.10         

ity of



                                                3.104.2.7 4.2.7.3.698         [place]

[place]



                                                .3.467689 084.8           Medica

l



                                                .8                      [place]

 

        [facility]    1040

[id number] Univers



        14:00:00 14:00:00                 RADHA melton o

f



                                                                        [facility]

 

        [facility]     [id number]

86 Univers



        04:52:00 08:20:00                 RADHA melton of



                                                                        [facility]

 

        2022 Emergency         Brigido, 1.2.840.2 [id number]

[id number] Univers



        04:52:00 08:20:00                 Radha 28187.1.1                 ity 

of



                                                3.104.2.7                 [place]



                                                .3.195756                 Medica

l



                                                .8                      [place]

 

        2022 Travel                  1.2.840.1 1.2.902.709 7739

5013 Univers



        00:00:00 00:00:00                         65941.1.1 350.1.13.10         

ity of



                                                3.104.2.7 4.2.7.3.698         [place]

[place]



                                                .3.325653 084.8           Medica

l



                                                .8                      [place]

 

        2022 Transition         Thomas,  1.2.840.7 [id number]

[id number] Univers



        00:00:00 00:00:00 of Care         Dulce 98132.1.1                 i

ty of



                                                3.104.2.7                 [place]



                                                .3.209041                 Medica

l



                                                .8                      [place]

 

        2022 Inpatient X       ABDIEL, [facility]    KRISH     70249

94591 Univers



        20:31:00 21:18:00                 TREY melton of



                                                                        [facility]

 

        2022 [facility]         Fofana Radha 1.2.840.1 [id number]

[id number] 

Univers



        20:31:00 21:18:00 Encounter         Noe Phillips 62239.1.1              

   ity of



                                        [place] 3.104.2.7              

   [place]



                                                .3.225640                 Medica

l



                                                .8                      [place]

 

        2022 Transition         Thomas,  1.2.840.7 [id number] 94

[id number] Univers



        00:00:00 00:00:00 of Care         Dulce 69901.1.1                 i

ty of



                                                3.104.2.7                 [place]



                                                .3.380959                 Medica

l



                                                .8                      [place]

 

        2022 Travel                  1.2.840.1 1.2.520.109 4495

6235 Univers



        00:00:00 00:00:00                         19453.1.1 350.1.13.10         

ity of



                                                3.104.2.7 4.2.7.3.698         [place]

[place]



                                                .3.957615 084.8           Medica

l



                                                .8                      [place]

 

        2022 Transition         Guzman,  1.2.840.9 [id number] 94

[id number] Univers



        00:00:00 00:00:00 of Care         Dulce 14952.1.1                 i

ty of



                                                3.104.2.7                 [place]



                                                .3.442697                 Medica

l



                                                .8                      [place]

 

        2022 Inpatient X       JOHN [facility]     [id number]

48 Univers



        22:21:00 16:16:00                 EDWARDO melton [facility]

 

        2022 [facility]         Chan Josse LIAN 1.2.840.1 [id number]

[id number] 

Univers



        22:21:00 16:16:00 Encounter         Edwardo Lopez 71589.1.1             

    ity of



                                                3.104.2.7                 [place]



                                                .3.517435                 Medica

l



                                                .8                      [place]

 

        2022 Travel                  1.2.840.1 1.2.239.602 4631

3062 Univers



        00:00:00 00:00:00                         11704.1.1 350.1.13.10         

ity of



                                                3.104.2.7 4.2.7.3.698         [place]

[place]



                                                .3.403283 084.8           Medica

l



                                                .8                      [place]

 

        2022 2022-06-10 [facility]       [facility]    [place]     [id number]

8802 Univers



        18:25:00 19:30:00                 RACHEL melton [facility]

 

        2022 2022-06-10 Emergency         Jose Ramonsofía Shelton 1.2.840.1 [id number]

[id number] 

Univers



        18:25:00 19:30:00                 Rachel Bailey 72897.1.1              

   ity of



                                                3.104.2.7                 [place]



                                                .3.233147                 Medica

l



                                                .8                      [place]

 

        2022 Travel                  1.2.840.1 1.2.253.018 1687

2645 Univers



        00:00:00 00:00:00                         10676.1.1 350.1.13.10         

ity of



                                                3.104.2.7 4.2.7.3.698         [place]

[place]



                                                .3.256195 084.8           Medica

l



                                                .8                      [place]

 

        2022 Travel                  1.2.840.1 1.2.865.212 4540

5045 Univers



        00:00:00 00:00:00                         35846.1.1 350.1.13.10         

ity of



                                                3.104.2.7 4.2.7.3.698         [place]

[place]



                                                .3.071473 084.8           Medica

l



                                                .8                      [place]

 

        2022 Transition         Rodney, 1.2.840.7 [id number]

[id number] Univers



        00:00:00 00:00:00 of Care         Stephany M 91463.1.1                 ity

 of



                                                3.104.2.7                 [place]



                                                .3.938358                 Medica

l



                                                .8                      [place]

 

        2022 Inpatient X       SHAIKH [facility]     [id number]

83 Univers



        20:53:00 18:25:00                 DEAN spangler



                                                                        [facility]

 

        2022 [facility]         Radha Fofana 1.2.840.1 [id number]

[id number] 

Univers



        20:53:00 18:25:00 Encounter         Tobias Hernandez 56314.1.1           

      ity of



                                        TerminLokesh campbelli 3.104.2.7             

    [place]



                                        Martha Powell H .3.357038               

  Medical



                                        Dean Segovia .8                     

 [place]

 

        2022 Travel                  1.2.840.1 1.2.327.540 2342

3172 Univers



        00:00:00 00:00:00                         34124.1.1 350.1.13.10         

ity of



                                                3.104.2.7 4.2.7.3.698         [place]

[place]



                                                .3.236499 084.8           Medica

l



                                                .8                      [place]

 

        2022 Transition         Guzman,  1.2.840.9 [id number]

[id number] Univers



        00:00:00 00:00:00 of Care         Dulce 58739.1.1                 i

ty of



                                                3.104.2.7                 [place]



                                                .3.125170                 Medica

l



                                                .8                      [place]

 

        2022-05-10 2022 Inpatient X       LETITIA [facility]     [id number]

201 Univers



        19:10:00 17:32:00                 ADNAN                           ity of



                                                                        [facility]

 

        2022-05-10 2022 Hospital         Radha Fofana 1.2.840.1 [id number]

[id number] 

Univers



        19:10:00 17:32:00 Encounter         Rand Dong 50074.1.1            

     ity of



                                                3.104.2.7                 [place]



                                                .3.144974                 Medica

l



                                                .8                      [place]

 

        2022-05-10 2022-05-10 Travel                  1.2.840.1 1.2.707.553 7582

6685 Univers



        00:00:00 00:00:00                         21101.1.1 350.1.13.10         

ity of



                                                3.104.2.7 4.2.7.3.698         [place]

[place]



                                                .3.742038 084.8           Medica

l



                                                .8                      [place]

 

        2022 Observatio                 nullFlavo [facility] 5510

[id number] Memoria



        12:00:00 22:50:00 mellissa Garcia [address] [place] [address]

 

        2022 Observatio                 nullFlavo [facility] 5510

[id number] Memoria



        12:00:00 22:50:00 mellissa Garcia [address] [place] [address]

 

        2022 Outpatient         Donny [facility]   5510

[id number] 



        07:00:00 17:50:00                 Trey [facility]      

 

        2022 Outpatient U       BL[place], [facility]    

    [facility]



        07:00:00 17:50:00                 TREY                          

 

        2022 Outpatient         Donny, [facility]   5510

[id number] 



        18:14:00 18:14:00                 Trey Blas      

 

        2022 [facility]       [facility]     [id number]

29 Univers



        22:45:00 01:52:00                 CHRIS                         daniellissa [facility]

 

        2022 Emergency         Singer, [facility]    1.2.020.386 2325

2685 Univers



        22:45:00 01:52:00                 Chris MCCARTHY 350.1.13.10         i

[facility] 4.2.7.2.686         [facility]  [phone]         [address]

 

        2022 Inpatient                 nullFlavo [facility] [address]

[id number] Memoria



        05:12:00 15:30:00                         r       [address]

 

        2022 Inpatient                 [facility] 46893

22223 Memoria



        05:12:00 15:30:00                         r       [address]

 

        2022 Outpatient         Ap,  [facility]   [id number]

[id number] 



        00:12:00 10:30:00                 Bernardo                  Krysta Guajardo                         

 

        2022 Emergency                 [facility] 05350

08109 Memoria



        02:51:00 02:51:00                         r       [address]

 

        2022 Emergency                 [facility] 43352

92007 Memoria



        02:51:00 02:51:00                         r       [address]

 

        2022 Outpatient         Ap,  [facility]   [id number]

[id number] 



        00:12:00 00:12:00                 Bernardo                  Krysta      



                                        [facility]                         

 

        2022 Outpatient         Ap,  [facility]   [id number]

620 



        21:51:00 21:51:00                 Bernardo                  Margarette      



                                        [facility]                         

 

        2022 Emergency X       ALLISON ROMEO [facility]    ERT     [id number]

5665 Univers



        18:08:00 22:51:00                                                 [facility]

 

        2022 Emergency         Agueda, ALLISON [facility]    1.2.840.114 92

[id number] Univers



        18:08:00 22:51:00                 Elena MCCARTHY 350.1.13.10         i

ty of



                                                [facility] 4.2.7.2.686         [facility]  [phone]         Medi

sarah



                                                        084             [place]

 

        2022 Transition         MAHENDRA GuzmanMELLISSA  1.2.840.114 907

[id number] Univers



        00:00:00 00:00:00 of Care         Dulce DUNN   350.1.13.10        

 ity of



                                                [place]   4.2.7.2.686         [place]

[place]



                                                        [phone]         Medi

sarah



                                                        403             [place]

 

        2022 [facility] X       [facility]    [place]     [id number]

27 Univers



        19:07:00 19:39:00                 ASHLEY                          [facility]

 

        2022 [facility]    1.2.840.

114 [id number] 

Univers



        19:07:00 19:39:00 Encounter         Rachel Bailey SHARI 350.1.13.10 

        ity of



                                                [place] 4.2.7.2.686         [facility]  [phone]         [address]

 

        2022 [facility] X       [facility]    ERT     [id number]

54 Univers



        14:41:00 22:07:00                 ALMA melton [facility]

 

        2022 Emergency         [facility]    1.2.672.570 2143

7030 Univers



        14:41:00 22:07:00                 Alma MCCARTHY 350.1.13.10         

ity of



                                                [place] 4.2.7.2.686         [facility]  [phone]         [address]

 

        2022-01-15 2022-01-15 Emergency X       [facility]    ERT     [id number]

25 Univers



        14:49:00 21:33:00                 NEETA melton [facility]

 

        2022-01-15 2022-01-15 Emergency         [facility]    1.2.217.040 7182

0725 Univers



        14:49:00 21:33:00                 Neeta MCCARTHY 350.1.13.10         

ity of



                                                [facility] 4.2.7.2.686         [facility]  [phone]         [address]

 

        2021 Laboratory         Only, Ang Db Test [facility]    1.2.8

40.114 [id number] 

Univers



        18:00:00 18:15:00 Only            Mario AlbertoNicole [facility]  350.1.13.10      

   ity of



                                                [place] 4.2.7.2.686         Eric

as



                                                HÉCTOR?BLEA [phone]         [address]



                                                MEDICAL                 



                                                OFFICE                  



                                                BUILDING                 

 

        2021 Outpatient R       MARIO ALBERTO   [facility]    [id number]

787 Univers



        18:00:00 18:00:00                 NICOLE                          [facility]

 

        2021 Emergency X       SINGER, [facility]    ERT     [id number]

74 Univers



        15:54:00 18:34:00                 CHRIS                         [facility]

 

        2021 Emergency         Singer, [facility]    1.2.723.780 8699

8236 Univers



        15:54:00 18:34:00                 Chris BRISENOCIARAN 350.1.13.10         i

ty of



                                                [facility] 4.2.7.2.686         [facility]  [phone]         [address]

 

        2021 Emergency         [facility]    1.2.840.114 [id number]

[id number] Univers



        18:22:00 22:21:00                 Acacia TWAN Shari 350.1.13.10        

 ity of



                                                [place] 4.2.7.2.686         [facility]  [phone]         [address]

 

        2021 Emergency         [facility]    1.2.840.114 84

[id number] 



        18:22:00 22:21:00                 Acacia TWAN Shari 350.1.13.10        

 



                                                [place] 4.2.7.2.686         



                                                [place]  [phone]         



                                                        [facility]             

 

        2021 Emergency X       DAISY, [facility]    ERT     [id number]

4744 Univers



        18:22:00 18:22:00                 FOLRANJANO                         [facility]

 

        2019 Phone                   nullFlavo MNA     [id number]

55 Memoria



        16:11:26 04:59:59 Message                 r       Neurosurger 08      l



                                                        y [facility]

 

        2019 Phone                   nullFlavo MNA     [id number]

55 Memoria



        16:11:26 04:59:59 Message                 r       Neurosurger 08      l



                                                        y [facility]

 

        2019 Outpatient                 MHMISCHER MHMISCHER 551

[id number] 



        11:11:26 23:59:59                                         08      

 

        2019 Phone                   nullFlavo MNA     [id number]

55 Memoria



        16:16:47 04:59:59 Message                 r       Neurosurger 07      l



                                                        y [facility]

 

        2019 Phone                   nullFlavo MNA     [id number]

55 Memoria



        16:16:47 04:59:59 Message                 r       Neurosurger 07      l



                                                        y [facility]

 

        2019 Outpatient                 MHMISCHER MISCHER 551

[id number] 



        11:16:47 23:59:59                                         07      

 

        2019-07-15 2019 Phone                   nullFlavo MNA     [id number]

55 Memoria



        15:52:03 04:59:59 Message                 r       Neurosurger 06      l



                                                        y [facility]

 

        2019-07-15 2019 Phone                   nullFlavo MNA     [id number]

55 Memoria



        15:52:03 04:59:59 Message                 r       Neurosurger 06      l



                                                        y [facility]

 

        2019-07-15 2019 Outpatient                 MHMISCHER MISCHER 551

[id number] 



        10:52:03 23:59:59                                         2019 Phone                   nullFlavo MNA     [id number]

55 Memoria



        18:55:03 04:59:59 Message                 r       Neurosurger 05      l



                                                        y [facility]

 

        2019 Phone                   nullFlavo MNA     [id number]

55 Memoria



        18:55:03 04:59:59 Message                 r       Neurosurger 05      l



                                                        y [facility]

 

        2019 Outpatient                 MHMISCHER MHMISCHER 551

[id number] 



        13:55:03 23:59:59                                         05      

 

        2018 Emergency                 [facility] [id number]

[id number] Memoria



        10:12:00 18:24:00                         r       [place] [address]      [facility]

 

        2018 Emergency                 [facility] 40786

95243 Memoria



        10:12:00 18:24:00                         r       [address]

 

        2018 Outpatient         Jesuivannainger [facility]   5510

[id number] 



        04:12:00 12:24:00                 Deisy Dial      

 

        2018 Inpatient                 [facility] 91702

48452 Memoria



        22:40:00 17:28:00                         r       [place] [address]      [facility]

 

        2018 Inpatient                 [facility] 86766

29174 Memoria



        22:40:00 17:28:00                         [address]

 

        2018 Outpatient         Deedee Reinoso [facility]   551

[id number] 



        17:40:00 12:28:00                 Jamie                   23      







Results







           Test Description Test Time  Test Comments Results    Result Comments 

Source









                    POCT GLUCOSE (AUTOMATED) 2022 23:04:10 









                      Test Item  Value      Reference Range Interpretation Comme

nts









             POCT GLU (test code = 9223969852) 142 mg/dL           H      

      

 

             Lab Interpretation (test code = 65704-7) Abnormal                  

             



[facility] GLUCOSE (AUTOMATED)2022 18:07:54





             Test Item    Value        Reference Range Interpretation Comments

 

             POCT GLU (test code = 2238554399) 198 mg/dL           H      

      

 

             Lab Interpretation (test code = Abnormal                           

    



             25655-9)                                            



[facility] GLUCOSE (AUTOMATED)2022 13:54:10





             Test Item    Value        Reference Range Interpretation Comments

 

             POCT GLU (test code = 4836097746) 141 mg/dL           H      

      

 

             Lab Interpretation (test code = Abnormal                           

    



             62247-4)                                            



[facility] GLUCOSE (AUTOMATED)2022 02:47:37





             Test Item    Value        Reference Range Interpretation Comments

 

             POCT GLU (test code = 9612160423) 150 mg/dL           H      

      

 

             Lab Interpretation (test code = Abnormal                           

    



             36207-2)                                            



[facility]. METABOLIC PANEL (84782)2022 
23:49:17





             Test Item    Value        Reference Range Interpretation Comments

 

             NA (test code = 139 mmol/L   135-145                   



             [id number])                                         

 

             K (test code = 4.5 mmol/L   3.5-5.0                   



             4391394676)                                         

 

             CL (test code = 104 mmol/L                       



             2036910738)                                         

 

             CO2 TOTAL (test code = 25 mmol/L    23-31                     



             0488117218)                                         

 

             AGAP (test code =              2-16                      



             1199734174)                                         

 

             BUN (test code = 13 mg/dL     7-23                      



             8103021435)                                         

 

             GLUCOSE (test code = 228 mg/dL           H            



             0499476607)                                         

 

             CREATININE (test code = 0.56 mg/dL   0.60-1.25    L            



             5583803260)                                         

 

             TOTAL BILI (test code = 0.6 mg/dL    0.1-1.1                   



             6872914693)                                         

 

             CALCIUM (test code = 9.7 mg/dL    8.6-10.6                  



             0290317999)                                         

 

             T PROTEIN (test code = 7.8 g/dL     6.3-8.2                   



             8147917391)                                         

 

             ALBUMIN (test code = 4.4 g/dL     3.5-5.0                   



             7920303455)                                         

 

             ALK PHOS (test code = 132 U/L             H            



             9584462337)                                         

 

             ALTv (test code = 31 U/L       5-50                      



             1742-6)                                             

 

             AST(SGOT) (test code = 20 U/L       13-40                     



             1158350025)                                         

 

             eGFR (test code =              mL/min/1.73m2              



             5245065780)                                         

 

             MLA (test code = MAL) Association of                           



                          Glomerular Filtration                           



                          Rate (GFR) and Staging                           



                          of Kidney Disease*                           



                          +---------------------                           



                          --+-------------------                           



                          --+-------------------                           



                          ------+| GFR                           



                          (mL/min/1.73 m2) ?|                           



                          With Kidney Damage ?|                           



                          ?Without Kidney                           



                          Damage+---------------                           



                          --------+-------------                           



                          --------+-------------                           



                          ------------+| ?>90 ?                           



                          ? ? ? ? ? ? ? ?|                           



                          ?Stage one ? ? ? ? ?|                           



                          ? Normal ? ? ? ? ? ? ?                           



                          ?+--------------------                           



                          ---+------------------                           



                          ---+------------------                           



                          -------+| ?60-89 ? ? ?                           



                          ? ? ? ? ?| ?Stage two                           



                          ? ? ? ? ?| ? Decreased                           



                          GFR ? ? ? ?                            



                          +---------------------                           



                          --+-------------------                           



                          --+-------------------                           



                          ------+| ?30-59 ? ? ?                           



                          ? ? ? ? ?| ?Stage                           



                          three ? ? ? ?| ? Stage                           



                          three ? ? ? ? ?                           



                          +---------------------                           



                          --+-------------------                           



                          --+-------------------                           



                          ------+| ?15-29 ? ? ?                           



                          ? ? ? ? ?| ?Stage four                           



                          ? ? ? ? | ? Stage four                           



                          ? ? ? ? ?                              



                          ?+--------------------                           



                          ---+------------------                           



                          ---+------------------                           



                          -------+| ?<15 (or                           



                          dialysis) ? ?| ?Stage                           



                          five ? ? ? ? | ? Stage                           



                          five ? ? ? ? ?                           



                          ?+--------------------                           



                          ---+------------------                           



                          ---+------------------                           



                          -------+ *Each stage                           



                          assumes the associated                           



                          GFR level has been in                           



                          effect for at least                           



                          three months. ?Stages                           



                          1 to 5, with or                           



                          without kidney                           



                          disease, indicate                           



                          chronic kidney                           



                          disease. Notes:                           



                          Determination of                           



                          stages one and two                           



                          (with eGFR                             



                          >59mL/min/1.73 m2)                           



                          requires estimation of                           



                          kidney damage for at                           



                          least three months as                           



                          defined by structural                           



                          or functional                           



                          abnormalities of the                           



                          kidney, manifested by                           



                          either:Pathological                           



                          abnormalities or                           



                          Markers of kidney                           



                          damage (including                           



                          abnormalities in the                           



                          composition of the                           



                          blood or urine or                           



                          abnormalities in                           



                          imaging tests).                           

 

             Lab Interpretation Abnormal                               



             (test code = 21833-7)                                        



[facility] WITH [id number] 23:45:35





             Test Item    Value        Reference Range Interpretation Comments

 

             WBC (test code =              See_Comment                [Automated



             7519-2)                                             message] The sy

stem



                                                                 which generated



                                                                 this result



                                                                 transmitted



                                                                 reference range

:



                                                                 4.20 - 10.70



                                                                 10*3/?L. The



                                                                 reference range

 was



                                                                 not used to



                                                                 interpret this



                                                                 result as



                                                                 normal/abnormal

.

 

             RBC (test code =              See_Comment  H             [Automated



             607-8)                                              message] The sy

stem



                                                                 which generated



                                                                 this result



                                                                 transmitted



                                                                 reference range

:



                                                                 4.26 - 5.52



                                                                 10*6/?L. The



                                                                 reference range

 was



                                                                 not used to



                                                                 interpret this



                                                                 result as



                                                                 normal/abnormal

.

 

             HGB (test code = 16.4 g/dL    12.2-16.4                 



             718-7)                                              

 

             HCT (test code = 49.3 %       38.4-49.3                 



             4544-3)                                             

 

             MCV (test code = 83.6 fL      81.7-95.6                 



             787-2)                                              

 

             MCH (test code = 27.8 pg      26.1-32.7                 



             785-6)                                              

 

             MCHC (test code = 33.3 g/dL    31.2-35.0                 



             786-4)                                              

 

             RDW-SD (test code = 45.1 fL      38.5-51.6                 



             66978-4)                                            

 

             RDW-CV (test code = 14.9 %       12.1-15.4                 



             788-0)                                              

 

             PLT (test code =              See_Comment                [Automated



             877-3)                                              message] The sy

stem



                                                                 which generated



                                                                 this result



                                                                 transmitted



                                                                 reference range

:



                                                                 150 - 328 10*3/

?L.



                                                                 The reference r

zhen



                                                                 was not used to



                                                                 interpret this



                                                                 result as



                                                                 normal/abnormal

.

 

             MPV (test code = 10.9 fL      9.8-13.0                  



             24144-9)                                            

 

             IPF % (test code = 9.9 %        1.2-10.7                  Platelet 

count



             [id number])                                         measured by



                                                                 fluorescence



                                                                 method.

 

             NRBC/100 WBC (test              See_Comment                [Automat

ed



             code = 9471574691)                                        message] 

The system



                                                                 which generated



                                                                 this result



                                                                 transmitted



                                                                 reference range

:



                                                                 0.0 - 10.0 /100



                                                                 WBCs. The refer

ence



                                                                 range was not u

sed



                                                                 to interpret th

is



                                                                 result as



                                                                 normal/abnormal

.

 

             NRBC x10^3 (test code              See_Comment                [Auto

mated



             = 2162536012)                                        message] The s

ystem



                                                                 which generated



                                                                 this result



                                                                 transmitted



                                                                 reference range

:



                                                                 10*3/?L. The



                                                                 reference range

 was



                                                                 not used to



                                                                 interpret this



                                                                 result as



                                                                 normal/abnormal

.

 

             GRAN MAT (NEUT) % 65.4 %                                 



             (test code = 770-8)                                        

 

             IMM GRAN % (test code 0.60 %                                 



             = 0990037503)                                        

 

             LYMPH % (test code = 23.1 %                                 



             736-9)                                              

 

             MONO % (test code = 7.9 %                                  



             5905-5)                                             

 

             EOS % (test code = 2.6 %                                  



             713-8)                                              

 

             BASO % (test code = 0.4 %                                  



             706-2)                                              

 

             GRAN MAT x10^3(ANC) 6.34 10*3/uL 1.99-6.95                 



             (test code =                                        



             2416542711)                                         

 

             IMM GRAN x10^3 (test 0.06 10*3/uL 0.00-0.06                 



             code = 4524188056)                                        

 

             LYMPH x10^3 (test code 2.24 10*3/uL 1.09-3.23                 



             = 731-0)                                            

 

             MONO x10^3 (test code 0.77 10*3/uL 0.36-1.02                 



             = 742-7)                                            

 

             EOS x10^3 (test code = 0.25 10*3/uL 0.06-0.53                 



             711-2)                                              

 

             BASO x10^3 (test code 0.04 10*3/uL 0.01-0.09                 



             = 704-7)                                            

 

             Lab Interpretation Abnormal                               



             (test code = 91593-7)                                        



[id number]





             Test Item    Value        Reference Range Interpretation Comments

 

             LACTIC ACID (test code = 3.00 mmol/L  0.50-2.20    H            



             4367052472)                                         

 

             Lab Interpretation (test code = Abnormal                           

    



             56323-3)                                            



[facility] WITH [id number] 01:28:46





             Test Item    Value        Reference Range Interpretation Comments

 

             WBC (test code =              See_Comment                [Automated



             6690-2)                                             message] The sy

stem



                                                                 which generated



                                                                 this result



                                                                 transmitted



                                                                 reference range

:



                                                                 4.20 - 10.70



                                                                 10*3/?L. The



                                                                 reference range

 was



                                                                 not used to



                                                                 interpret this



                                                                 result as



                                                                 normal/abnormal

.

 

             RBC (test code =              See_Comment  H             [Automated



             789-8)                                              message] The sy

stem



                                                                 which generated



                                                                 this result



                                                                 transmitted



                                                                 reference range

:



                                                                 4.26 - 5.52



                                                                 10*6/?L. The



                                                                 reference range

 was



                                                                 not used to



                                                                 interpret this



                                                                 result as



                                                                 normal/abnormal

.

 

             HGB (test code = 17.4 g/dL    12.2-16.4    H            



             718-7)                                              

 

             HCT (test code = 52.0 %       38.4-49.3    H            



             4544-3)                                             

 

             MCV (test code = 85.5 fL      81.7-95.6                 



             787-2)                                              

 

             MCH (test code = 28.6 pg      26.1-32.7                 



             785-6)                                              

 

             MCHC (test code = 33.5 g/dL    31.2-35.0                 



             786-4)                                              

 

             RDW-SD (test code = 47.6 fL      38.5-51.6                 



             97442-6)                                            

 

             RDW-CV (test code = 15.4 %       12.1-15.4                 



             788-0)                                              

 

             PLT (test code =              See_Comment  L             [Automated



             777-3)                                              message] The sy

stem



                                                                 which generated



                                                                 this result



                                                                 transmitted



                                                                 reference range

:



                                                                 150 - 328 10*3/

?L.



                                                                 The reference r

zhen



                                                                 was not used to



                                                                 interpret this



                                                                 result as



                                                                 normal/abnormal

.

 

             MPV (test code = 10.6 fL      9.8-13.0                  



             52779-6)                                            

 

             NRBC/100 WBC (test              See_Comment                [Automat

ed



             code = 3017283238)                                        message] 

The system



                                                                 which generated



                                                                 this result



                                                                 transmitted



                                                                 reference range

:



                                                                 0.0 - 10.0 /100



                                                                 WBCs. The refer

ence



                                                                 range was not u

sed



                                                                 to interpret th

is



                                                                 result as



                                                                 normal/abnormal

.

 

             NRBC x10^3 (test code              See_Comment                [Auto

mated



             = [id number])                                        message] The s

ystem



                                                                 which generated



                                                                 this result



                                                                 transmitted



                                                                 reference range

:



                                                                 10*3/?L. The



                                                                 reference range

 was



                                                                 not used to



                                                                 interpret this



                                                                 result as



                                                                 normal/abnormal

.

 

             GRAN MAT (NEUT) % 70.8 %                                 



             (test code = 770-8)                                        

 

             IMM GRAN % (test code 0.50 %                                 



             = [id number])                                        

 

             LYMPH % (test code = 20.4 %                                 



             736-9)                                              

 

             MONO % (test code = 5.9 %                                  



             5905-5)                                             

 

             EOS % (test code = 2.0 %                                  



             713-8)                                              

 

             BASO % (test code = 0.4 %                                  



             706-2)                                              

 

             GRAN MAT x10^3(ANC) 7.23 10*3/uL 1.99-6.95    H            



             (test code =                                        



             8860316136)                                         

 

             IMM GRAN x10^3 (test 0.05 10*3/uL 0.00-0.06                 



             code = 4019903991)                                        

 

             LYMPH x10^3 (test code 2.08 10*3/uL 1.09-3.23                 



             = 731-0)                                            

 

             MONO x10^3 (test code 0.60 10*3/uL 0.36-1.02                 



             = 742-7)                                            

 

             EOS x10^3 (test code = 0.20 10*3/uL 0.06-0.53                 



             711-2)                                              

 

             BASO x10^3 (test code 0.04 10*3/uL 0.01-0.09                 



             = 704-7)                                            

 

             Lab Interpretation Abnormal                               



             (test code = 23707-0)                                        



[facility]. METABOLIC PANEL ([id number])2022-10-30 
01:23:44





             Test Item    Value        Reference Range Interpretation Comments

 

             NA (test code = 141 mmol/L   135-145                   



             0105400460)                                         

 

             K (test code = 4.7 mmol/L   3.5-5.0                   



             6302776991)                                         

 

             CL (test code = 107 mmol/L                       



             7419685907)                                         

 

             CO2 TOTAL (test code = 23 mmol/L    23-31                     



             4601022180)                                         

 

             AGAP (test code =              2-16                      



             7405157456)                                         

 

             BUN (test code = 16 mg/dL     7-23                      



             4306601130)                                         

 

             GLUCOSE (test code = 144 mg/dL           H            



             6661946491)                                         

 

             CREATININE (test code = 0.54 mg/dL   0.60-1.25    L            



             4891150198)                                         

 

             TOTAL BILI (test code = 0.6 mg/dL    0.1-1.1                   



             1019552608)                                         

 

             CALCIUM (test code = 9.5 mg/dL    8.6-10.6                  



             6008482192)                                         

 

             T PROTEIN (test code = 7.7 g/dL     6.3-8.2                   



             7859460367)                                         

 

             ALBUMIN (test code = 4.4 g/dL     3.5-5.0                   



             8208000588)                                         

 

             ALK PHOS (test code = 101 U/L                          



             1568612679)                                         

 

             ALTv (test code = 28 U/L       5-50                      



             1742-6)                                             

 

             AST(SGOT) (test code = 20 U/L       13-40                     



             7472952108)                                         

 

             eGFR (test code =              mL/min/1.73m2              



             8153409822)                                         

 

             MAL (test code = MAL) Association of                           



                          Glomerular Filtration                           



                          Rate (GFR) and Staging                           



                          of Kidney Disease*                           



                          +---------------------                           



                          --+-------------------                           



                          --+-------------------                           



                          ------+| GFR                           



                          (mL/min/1.73 m2) ?|                           



                          With Kidney Damage ?|                           



                          ?Without Kidney                           



                          Damage+---------------                           



                          --------+-------------                           



                          --------+-------------                           



                          ------------+| ?>90 ?                           



                          ? ? ? ? ? ? ? ?|                           



                          ?Stage one ? ? ? ? ?|                           



                          ? Normal ? ? ? ? ? ? ?                           



                          ?+--------------------                           



                          ---+------------------                           



                          ---+------------------                           



                          -------+| ?60-89 ? ? ?                           



                          ? ? ? ? ?| ?Stage two                           



                          ? ? ? ? ?| ? Decreased                           



                          GFR ? ? ? ?                            



                          +---------------------                           



                          --+-------------------                           



                          --+-------------------                           



                          ------+| ?30-59 ? ? ?                           



                          ? ? ? ? ?| ?Stage                           



                          three ? ? ? ?| ? Stage                           



                          three ? ? ? ? ?                           



                          +---------------------                           



                          --+-------------------                           



                          --+-------------------                           



                          ------+| ?15-29 ? ? ?                           



                          ? ? ? ? ?| ?Stage four                           



                          ? ? ? ? | ? Stage four                           



                          ? ? ? ? ?                              



                          ?+--------------------                           



                          ---+------------------                           



                          ---+------------------                           



                          -------+| ?<15 (or                           



                          dialysis) ? ?| ?Stage                           



                          five ? ? ? ? | ? Stage                           



                          five ? ? ? ? ?                           



                          ?+--------------------                           



                          ---+------------------                           



                          ---+------------------                           



                          -------+ *Each stage                           



                          assumes the associated                           



                          GFR level has been in                           



                          effect for at least                           



                          three months. ?Stages                           



                          1 to 5, with or                           



                          without kidney                           



                          disease, indicate                           



                          chronic kidney                           



                          disease. Notes:                           



                          Determination of                           



                          stages one and two                           



                          (with eGFR                             



                          >59mL/min/1.73 m2)                           



                          requires estimation of                           



                          kidney damage for at                           



                          least three months as                           



                          defined by structural                           



                          or functional                           



                          abnormalities of the                           



                          kidney, manifested by                           



                          either:Pathological                           



                          abnormalities or                           



                          Markers of kidney                           



                          damage (including                           



                          abnormalities in the                           



                          composition of the                           



                          blood or urine or                           



                          abnormalities in                           



                          imaging tests).                           

 

             Lab Interpretation Abnormal                               



             (test code = 54919-3)                                        



[facility]





             Test Item    Value        Reference Range Interpretation Comments

 

             LIPASE (test code = 0225183338) 99 U/L       0-220                 

    

 

             Lab Interpretation (test code = Normal                             

    



             94949-6)                                            



[facility] GLUCOSE (AUTOMATED)2022-10-23 13:16:26





             Test Item    Value        Reference Range Interpretation Comments

 

             POCT GLU (test code = 9315493547) 162 mg/dL           H      

      

 

             Lab Interpretation (test code = Abnormal                           

    



             24602-9)                                            



[facility] GLUCOSE (AUTOMATED)2022-10-23 01:23:12





             Test Item    Value        Reference Range Interpretation Comments

 

             POCT GLU (test code = 3446144117) 248 mg/dL           H      

      

 

             Lab Interpretation (test code = Abnormal                           

    



             29534-0)                                            



[facility] GLUCOSE (AUTOMATED)2022-10-22 21:32:18





             Test Item    Value        Reference Range Interpretation Comments

 

             POCT GLU (test code = 4714667876) 239 mg/dL           H      

      

 

             Lab Interpretation (test code = Abnormal                           

    



             78976-7)                                            



[facility] GLUCOSE (AUTOMATED)2022-10-22 01:49:48





             Test Item    Value        Reference Range Interpretation Comments

 

             POCT GLU (test code = 8854991703) 317 mg/dL           H      

      

 

             Lab Interpretation (test code = Abnormal                           

    



             54577-7)                                            



[facility] GLUCOSE (AUTOMATED)2022-10-21 21:38:03





             Test Item    Value        Reference Range Interpretation Comments

 

             POCT GLU (test code = 6663181596) 139 mg/dL           H      

      

 

             Lab Interpretation (test code = Abnormal                           

    



             16992-4)                                            



[facility]31





             Test Item    Value        Reference Range Interpretation Comments

 

             POCT GLU (test code = 0051836907) 164 mg/dL           H      

      

 

             Lab Interpretation (test code = Abnormal                           

    



             74099-7)                                            



[facility] GLUCOSE (AUTOMATED)2022-10-21 12:46:40





             Test Item    Value        Reference Range Interpretation Comments

 

             POCT GLU (test code = 9810680586) 154 mg/dL           H      

      

 

             Lab Interpretation (test code = Abnormal                           

    



             04842-6)                                            



[facility]





             Test Item    Value        Reference Range Interpretation Comments

 

             LIPASE (test code = 1396322718) 216 U/L      0-220                 

    

 

             Lab Interpretation (test code = Normal                             

    



             49283-3)                                            



[facility]. METABOLIC PANEL ([id number])2022-10-21 
06:39:52





             Test Item    Value        Reference Range Interpretation Comments

 

             NA (test code = 139 mmol/L   135-145                   



             7438100973)                                         

 

             K (test code = 4.7 mmol/L   3.5-5                     



             2629502292)                                         

 

             CL (test code = 106 mmol/L                       



             0851108868)                                         

 

             CO2 TOTAL (test code = 20 mmol/L    23-31        L            



             5992742300)                                         

 

             AGAP (test code =              2-16                      



             4646275749)                                         

 

             BUN (test code = 16 mg/dL     7-23                      



             7912518827)                                         

 

             GLUCOSE (test code = 224 mg/dL           H            



             1616241507)                                         

 

             CREATININE (test code = 0.72 mg/dL   0.6-1.25                  



             7616639769)                                         

 

             TOTAL BILI (test code = 0.4 mg/dL    0.1-1.1                   



             1179673250)                                         

 

             CALCIUM (test code = 9.3 mg/dL    8.6-10.6                  



             1389325202)                                         

 

             T PROTEIN (test code = 7.2 g/dL     6.3-8.2                   



             5496378809)                                         

 

             ALBUMIN (test code = 4.1 g/dL     3.5-5                     



             2874274000)                                         

 

             ALK PHOS (test code = 118 U/L                          



             7886668553)                                         

 

             ALTv (test code = 46 U/L       5-50                      



             1742-6)                                             

 

             AST(SGOT) (test code = 18 U/L       13-40                     



             4538214192)                                         

 

             eGFR (test code =              mL/min/1.73m2              



             0345072424)                                         

 

             MAL (test code = MAL) Association of                           



                          Glomerular Filtration                           



                          Rate (GFR) and Staging                           



                          of Kidney Disease*                           



                          +---------------------                           



                          --+-------------------                           



                          --+-------------------                           



                          ------+| GFR                           



                          (mL/min/1.73 m2) ?|                           



                          With Kidney Damage ?|                           



                          ?Without Kidney                           



                          Damage+---------------                           



                          --------+-------------                           



                          --------+-------------                           



                          ------------+| ?>90 ?                           



                          ? ? ? ? ? ? ? ?|                           



                          ?Stage one ? ? ? ? ?|                           



                          ? Normal ? ? ? ? ? ? ?                           



                          ?+--------------------                           



                          ---+------------------                           



                          ---+------------------                           



                          -------+| ?60-89 ? ? ?                           



                          ? ? ? ? ?| ?Stage two                           



                          ? ? ? ? ?| ? Decreased                           



                          GFR ? ? ? ?                            



                          +---------------------                           



                          --+-------------------                           



                          --+-------------------                           



                          ------+| ?30-59 ? ? ?                           



                          ? ? ? ? ?| ?Stage                           



                          three ? ? ? ?| ? Stage                           



                          three ? ? ? ? ?                           



                          +---------------------                           



                          --+-------------------                           



                          --+-------------------                           



                          ------+| ?15-29 ? ? ?                           



                          ? ? ? ? ?| ?Stage four                           



                          ? ? ? ? | ? Stage four                           



                          ? ? ? ? ?                              



                          ?+--------------------                           



                          ---+------------------                           



                          ---+------------------                           



                          -------+| ?<15 (or                           



                          dialysis) ? ?| ?Stage                           



                          five ? ? ? ? | ? Stage                           



                          five ? ? ? ? ?                           



                          ?+--------------------                           



                          ---+------------------                           



                          ---+------------------                           



                          -------+ *Each stage                           



                          assumes the associated                           



                          GFR level has been in                           



                          effect for at least                           



                          three months. ?Stages                           



                          1 to 5, with or                           



                          without kidney                           



                          disease, indicate                           



                          chronic kidney                           



                          disease. Notes:                           



                          Determination of                           



                          stages one and two                           



                          (with eGFR                             



                          >59mL/min/1.73 m2)                           



                          requires estimation of                           



                          kidney damage for at                           



                          least three months as                           



                          defined by structural                           



                          or functional                           



                          abnormalities of the                           



                          kidney, manifested by                           



                          either:Pathological                           



                          abnormalities or                           



                          Markers of kidney                           



                          damage (including                           



                          abnormalities in the                           



                          composition of the                           



                          blood or urine or                           



                          abnormalities in                           



                          imaging tests).                           

 

             Lab Interpretation Abnormal                               



             (test code = 26094-2)                                        



[facility] WITH DIFF2022-10-21 06:28:46





             Test Item    Value        Reference Range Interpretation Comments

 

             WBC (test code =              See_Comment                [Automated



             3490-2)                                             message] The sy

stem



                                                                 which generated



                                                                 this result



                                                                 transmitted



                                                                 reference range

:



                                                                 4.20 - 10.70



                                                                 10*3/?L. The



                                                                 reference range

 was



                                                                 not used to



                                                                 interpret this



                                                                 result as



                                                                 normal/abnormal

.

 

             RBC (test code =              See_Comment                [Automated



             655-8)                                              message] The sy

stem



                                                                 which generated



                                                                 this result



                                                                 transmitted



                                                                 reference range

:



                                                                 4.26 - 5.52



                                                                 10*6/?L. The



                                                                 reference range

 was



                                                                 not used to



                                                                 interpret this



                                                                 result as



                                                                 normal/abnormal

.

 

             HGB (test code = 15.6 g/dL    12.2-16.4                 



             718-7)                                              

 

             HCT (test code = 46.3 %       38.4-49.3                 



             4544-3)                                             

 

             MCV (test code = 84.6 fL      81.7-95.6                 



             787-2)                                              

 

             MCH (test code = 28.5 pg      26.1-32.7                 



             785-6)                                              

 

             MCHC (test code = 33.7 g/dL    31.2-35                   



             786-4)                                              

 

             RDW-SD (test code = 45.7 fL      38.5-51.6                 



             53837-5)                                            

 

             RDW-CV (test code = 14.8 %       12.1-15.4                 



             788-0)                                              

 

             PLT (test code =              See_Comment  H             [Automated



             777-3)                                              message] The sy

stem



                                                                 which generated



                                                                 this result



                                                                 transmitted



                                                                 reference range

:



                                                                 150 - 328 10*3/

?L.



                                                                 The reference r

zhen



                                                                 was not used to



                                                                 interpret this



                                                                 result as



                                                                 normal/abnormal

.

 

             MPV (test code = 11.0 fL      9.8-13                    



             52156-5)                                            

 

             NRBC/100 WBC (test              See_Comment                [Automat

ed



             code = 5878751249)                                        message] 

The system



                                                                 which generated



                                                                 this result



                                                                 transmitted



                                                                 reference range

:



                                                                 0.0 - 10.0 /100



                                                                 WBCs. The refer

ence



                                                                 range was not u

sed



                                                                 to interpret th

is



                                                                 result as



                                                                 normal/abnormal

.

 

             NRBC x10^3 (test code              See_Comment                [Auto

mated



             = 6969612039)                                        message] The s

ystem



                                                                 which generated



                                                                 this result



                                                                 transmitted



                                                                 reference range

:



                                                                 10*3/?L. The



                                                                 reference range

 was



                                                                 not used to



                                                                 interpret this



                                                                 result as



                                                                 normal/abnormal

.

 

             GRAN MAT (NEUT) % 56.7 %                                 



             (test code = 770-8)                                        

 

             IMM GRAN % (test code 0.70 %                                 



             = 0704803043)                                        

 

             LYMPH % (test code = 31.5 %                                 



             736-9)                                              

 

             MONO % (test code = 8.6 %                                  



             5905-5)                                             

 

             EOS % (test code = 2.0 %                                  



             713-8)                                              

 

             BASO % (test code = 0.5 %                                  



             706-2)                                              

 

             GRAN MAT x10^3(ANC) 4.84 10*3/uL 1.99-6.95                 



             (test code =                                        



             5637706825)                                         

 

             IMM GRAN x10^3 (test 0.06 10*3/uL 0-0.06                    



             code = 8155010954)                                        

 

             LYMPH x10^3 (test code 2.69 10*3/uL 1.09-3.23                 



             = 731-0)                                            

 

             MONO x10^3 (test code 0.73 10*3/uL 0.36-1.02                 



             = 742-7)                                            

 

             EOS x10^3 (test code = 0.17 10*3/uL 0.06-0.53                 



             711-2)                                              

 

             BASO x10^3 (test code 0.04 10*3/uL 0.01-0.09                 



             = 704-7)                                            

 

             Lab Interpretation Abnormal                               



             (test code = 13832-6)                                        



[facility] CULTURE(AEROBIC/ANAEROBIC)2022 
20:34:19





             Test Item    Value        Reference Range Interpretation Comments

 

             TISSUE CULTURE (test No aerobic/anaerobic                          

 



             code = 20474-3) organisms isolated                           

 

             Gram stain (test code No PMNs or Mononuclear                       

    



             = 664-3)     cells observed                           



[facility] GLUCOSE (AUTOMATED)2022 18:36:41





             Test Item    Value        Reference Range Interpretation Comments

 

             POCT GLU (test code = 7470735719) 162 mg/dL           H      

      

 

             Lab Interpretation (test code = Abnormal                           

    



             87124-1)                                            



[facility] GLUCOSE (AUTOMATED)2022 14:48:29





             Test Item    Value        Reference Range Interpretation Comments

 

             POCT GLU (test code = 0477838977) 191 mg/dL           H      

      

 

             Lab Interpretation (test code = Abnormal                           

    



             49005-6)                                            



[facility] GLUCOSE (AUTOMATED)2022 10:56:47





             Test Item    Value        Reference Range Interpretation Comments

 

             POCT GLU (test code = 2065163956) 242 mg/dL           H      

      

 

             Lab Interpretation (test code = Abnormal                           

    



             30483-7)                                            



[facility] GLUCOSE (AUTOMATED)2022 05:47:30





             Test Item    Value        Reference Range Interpretation Comments

 

             POCT GLU (test code = 9960731890) 327 mg/dL           H      

      

 

             Lab Interpretation (test code = Abnormal                           

    



             33305-6)                                            



[facility] GLUCOSE (AUTOMATED)2022 02:18:34





             Test Item    Value        Reference Range Interpretation Comments

 

             POCT GLU (test code = 2310166161) 309 mg/dL           H      

      

 

             Lab Interpretation (test code = Abnormal                           

    



             19831-8)                                            



[facility] GLUCOSE (AUTOMATED)2022 23:17:38





             Test Item    Value        Reference Range Interpretation Comments

 

             POCT GLU (test code = 0067882812) 260 mg/dL           H      

      

 

             Lab Interpretation (test code = Abnormal                           

    



             65893-9)                                            



[facility] GLUCOSE (AUTOMATED)2022 18:33:41





             Test Item    Value        Reference Range Interpretation Comments

 

             POCT GLU (test code = 5342418306) 264 mg/dL           H      

      

 

             Lab Interpretation (test code = Abnormal                           

    



             41147-8)                                            



[facility] GLUCOSE (AUTOMATED)2022 15:02:50





             Test Item    Value        Reference Range Interpretation Comments

 

             POCT GLU (test code = 4868189795) 219 mg/dL           H      

      

 

             Lab Interpretation (test code = Abnormal                           

    



             64612-0)                                            



[facility] METABOLIC PANEL (NA, K, CL, CO2, 
GLUCOSE, BUN, CREATININE, CA)2022 10:44:27





             Test Item    Value        Reference Range Interpretation Comments

 

             NA (test code = 132 mmol/L   135-145      L            



             9531050967)                                         

 

             K (test code = 4.4 mmol/L   3.5-5                     



             4124541131)                                         

 

             CL (test code = 103 mmol/L                       



             2947513029)                                         

 

             CO2 TOTAL (test code = 25 mmol/L    23-31                     



             1219602212)                                         

 

             AGAP (test code =              2-16                      



             4133973588)                                         

 

             BUN (test code = 15 mg/dL     7-23                      



             9884724720)                                         

 

             GLUCOSE (test code = 262 mg/dL           H            



             2842329418)                                         

 

             CREATININE (test code = 0.52 mg/dL   0.6-1.25     L            



             8024767521)                                         

 

             CALCIUM (test code = 8.3 mg/dL    8.6-10.6     L            



             8880766303)                                         

 

             eGFR (test code =              mL/min/1.73m2              



             3825327810)                                         

 

             MAL (test code = MAL) Association of                           



                          Glomerular Filtration                           



                          Rate (GFR) and Staging                           



                          of Kidney Disease*                           



                          +---------------------                           



                          --+-------------------                           



                          --+-------------------                           



                          ------+| GFR                           



                          (mL/min/1.73 m2) ?|                           



                          With Kidney Damage ?|                           



                          ?Without Kidney                           



                          Damage+---------------                           



                          --------+-------------                           



                          --------+-------------                           



                          ------------+| ?>90 ?                           



                          ? ? ? ? ? ? ? ?|                           



                          ?Stage one ? ? ? ? ?|                           



                          ? Normal ? ? ? ? ? ? ?                           



                          ?+--------------------                           



                          ---+------------------                           



                          ---+------------------                           



                          -------+| ?60-89 ? ? ?                           



                          ? ? ? ? ?| ?Stage two                           



                          ? ? ? ? ?| ? Decreased                           



                          GFR ? ? ? ?                            



                          +---------------------                           



                          --+-------------------                           



                          --+-------------------                           



                          ------+| ?30-59 ? ? ?                           



                          ? ? ? ? ?| ?Stage                           



                          three ? ? ? ?| ? Stage                           



                          three ? ? ? ? ?                           



                          +---------------------                           



                          --+-------------------                           



                          --+-------------------                           



                          ------+| ?15-29 ? ? ?                           



                          ? ? ? ? ?| ?Stage four                           



                          ? ? ? ? | ? Stage four                           



                          ? ? ? ? ?                              



                          ?+--------------------                           



                          ---+------------------                           



                          ---+------------------                           



                          -------+| ?<15 (or                           



                          dialysis) ? ?| ?Stage                           



                          five ? ? ? ? | ? Stage                           



                          five ? ? ? ? ?                           



                          ?+--------------------                           



                          ---+------------------                           



                          ---+------------------                           



                          -------+ *Each stage                           



                          assumes the associated                           



                          GFR level has been in                           



                          effect for at least                           



                          three months. ?Stages                           



                          1 to 5, with or                           



                          without kidney                           



                          disease, indicate                           



                          chronic kidney                           



                          disease. Notes:                           



                          Determination of                           



                          stages one and two                           



                          (with eGFR                             



                          >59mL/min/1.73 m2)                           



                          requires estimation of                           



                          kidney damage for at                           



                          least three months as                           



                          defined by structural                           



                          or functional                           



                          abnormalities of the                           



                          kidney, manifested by                           



                          either:Pathological                           



                          abnormalities or                           



                          Markers of kidney                           



                          damage (including                           



                          abnormalities in the                           



                          composition of the                           



                          blood or urine or                           



                          abnormalities in                           



                          imaging tests).                           

 

             Lab Interpretation Abnormal                               



             (test code = 16205-0)                                        



[facility] WITH [id number] 10:22:05





             Test Item    Value        Reference Range Interpretation Comments

 

             WBC (test code =              See_Comment                [Automated



             6690-2)                                             message] The sy

stem



                                                                 which generated



                                                                 this result



                                                                 transmitted



                                                                 reference range

:



                                                                 4.20 - 10.70



                                                                 10*3/?L. The



                                                                 reference range

 was



                                                                 not used to



                                                                 interpret this



                                                                 result as



                                                                 normal/abnormal

.

 

             RBC (test code =              See_Comment  L             [Automated



             789-8)                                              message] The sy

stem



                                                                 which generated



                                                                 this result



                                                                 transmitted



                                                                 reference range

:



                                                                 4.26 - 5.52



                                                                 10*6/?L. The



                                                                 reference range

 was



                                                                 not used to



                                                                 interpret this



                                                                 result as



                                                                 normal/abnormal

.

 

             HGB (test code = 10.5 g/dL    12.2-16.4    L            



             718-7)                                              

 

             HCT (test code = 31.2 %       38.4-49.3    L            



             4544-3)                                             

 

             MCV (test code = 88.9 fL      81.7-95.6                 



             787-2)                                              

 

             MCH (test code = 29.9 pg      26.1-32.7                 



             785-6)                                              

 

             MCHC (test code = 33.7 g/dL    31.2-35                   



             786-4)                                              

 

             RDW-SD (test code = 49.8 fL      38.5-51.6                 



             42391-8)                                            

 

             RDW-CV (test code = 15.9 %       12.1-15.4    H            



             788-0)                                              

 

             PLT (test code =              See_Comment  H             [Automated



             777-3)                                              message] The sy

stem



                                                                 which generated



                                                                 this result



                                                                 transmitted



                                                                 reference range

:



                                                                 150 - 328 10*3/

?L.



                                                                 The reference r

zhen



                                                                 was not used to



                                                                 interpret this



                                                                 result as



                                                                 normal/abnormal

.

 

             MPV (test code = 10.4 fL      9.8-13                    



             73056-8)                                            

 

             NRBC/100 WBC (test              See_Comment                [Automat

ed



             code = 1262586337)                                        message] 

The system



                                                                 which generated



                                                                 this result



                                                                 transmitted



                                                                 reference range

:



                                                                 0.0 - 10.0 /100



                                                                 WBCs. The refer

ence



                                                                 range was not u

sed



                                                                 to interpret th

is



                                                                 result as



                                                                 normal/abnormal

.

 

             NRBC x10^3 (test code              See_Comment                [Auto

mated



             = 7160631666)                                        message] The s

ystem



                                                                 which generated



                                                                 this result



                                                                 transmitted



                                                                 reference range

:



                                                                 10*3/?L. The



                                                                 reference range

 was



                                                                 not used to



                                                                 interpret this



                                                                 result as



                                                                 normal/abnormal

.

 

             GRAN MAT (NEUT) % 59.8 %                                 



             (test code = 770-8)                                        

 

             IMM GRAN % (test code 1.20 %                                 



             = 6026307417)                                        

 

             LYMPH % (test code = 28.2 %                                 



             736-9)                                              

 

             MONO % (test code = 8.4 %                                  



             5905-5)                                             

 

             EOS % (test code = 2.2 %                                  



             713-8)                                              

 

             BASO % (test code = 0.2 %                                  



             706-2)                                              

 

             GRAN MAT x10^3(ANC) 5.34 10*3/uL 1.99-6.95                 



             (test code =                                        



             8822478052)                                         

 

             IMM GRAN x10^3 (test 0.11 10*3/uL 0-0.06       H            



             code = 7540641839)                                        

 

             LYMPH x10^3 (test code 2.52 10*3/uL 1.09-3.23                 



             = 731-0)                                            

 

             MONO x10^3 (test code 0.75 10*3/uL 0.36-1.02                 



             = 742-7)                                            

 

             EOS x10^3 (test code = 0.20 10*3/uL 0.06-0.53                 



             711-2)                                              

 

             BASO x10^3 (test code              0.01-0.09                 



             = 704-7)                                            

 

             Lab Interpretation Abnormal                               



             (test code = 31503-0)                                        



[facility] GLUCOSE (AUTOMATED)2022 02:20:10





             Test Item    Value        Reference Range Interpretation Comments

 

             POCT GLU (test code = 7254008106) 281 mg/dL           H      

      

 

             Lab Interpretation (test code = Abnormal                           

    



             87579-6)                                            



[facility] GLUCOSE (AUTOMATED)2022-08-10 22:27:37





             Test Item    Value        Reference Range Interpretation Comments

 

             POCT GLU (test code = 6565337311) 187 mg/dL           H      

      

 

             Lab Interpretation (test code = Abnormal                           

    



             58202-1)                                            



[facility] GLUCOSE (AUTOMATED)2022-08-10 19:00:16





             Test Item    Value        Reference Range Interpretation Comments

 

             POCT GLU (test code = 1640402550) 167 mg/dL           H      

      

 

             Lab Interpretation (test code = Abnormal                           

    



             80957-2)                                            



[facility] GLUCOSE (AUTOMATED)2022-08-10 19:00:16





             Test Item    Value        Reference Range Interpretation Comments

 

             POCT GLU (test code = 7398523529) 167 mg/dL           H      

      

 

             Lab Interpretation (test code = Abnormal                           

    



             88792-9)                                            



[facility] GLUCOSE (AUTOMATED)2022-08-10 15:22:04





             Test Item    Value        Reference Range Interpretation Comments

 

             POCT GLU (test code = 2306823800) 138 mg/dL           H      

      

 

             Lab Interpretation (test code = Abnormal                           

    



             20174-1)                                            



[facility] GLUCOSE (AUTOMATED)2022-08-10 15:22:04





             Test Item    Value        Reference Range Interpretation Comments

 

             POCT GLU (test code = 1684782279) 138 mg/dL           H      

      

 

             Lab Interpretation (test code = Abnormal                           

    



             14512-1)                                            



[facility] GLUCOSE (AUTOMATED)2022-08-10 02:45:31





             Test Item    Value        Reference Range Interpretation Comments

 

             POCT GLU (test code = 3228298850) 142 mg/dL           H      

      

 

             Lab Interpretation (test code = Abnormal                           

    



             71490-7)                                            



[facility] GLUCOSE (AUTOMATED)2022-08-10 02:45:31





             Test Item    Value        Reference Range Interpretation Comments

 

             POCT GLU (test code = 3912299415) 142 mg/dL           H      

      

 

             Lab Interpretation (test code = Abnormal                           

    



             80381-2)                                            



[facility] GLUCOSE (AUTOMATED)2022 22:04:41





             Test Item    Value        Reference Range Interpretation Comments

 

             POCT GLU (test code = 0810501974) 260 mg/dL           H      

      

 

             Lab Interpretation (test code = Abnormal                           

    



             45340-6)                                            



[facility] GLUCOSE (AUTOMATED)2022 22:04:41





             Test Item    Value        Reference Range Interpretation Comments

 

             POCT GLU (test code = 5903605839) 260 mg/dL           H      

      

 

             Lab Interpretation (test code = Abnormal                           

    



             22492-5)                                            



[facility]  
[facility]





             Test Item    Value        Reference Range Interpretation Comments

 

             Blood Culture-Aerobic No organisms No growth                 Previo

us



             (test code = 17928-3) isolated                               prelim

inary



                                                                 verified result



                                                                 was Culture In



                                                                 Progress on



                                                                 2022 at 190

1



                                                                 CDTPrevious



                                                                 preliminary



                                                                 verified result



                                                                 was No growth a

t



                                                                 24 hours on



                                                                 2022 at 160

1



                                                                 CDTPrevious



                                                                 preliminary



                                                                 verified result



                                                                 was No growth a

t



                                                                 48 hours on



                                                                 2022 at 160

1



                                                                 CDTPrevious



                                                                 preliminary



                                                                 verified result



                                                                 was No growth a

t



                                                                 72 hours on



                                                                 2022 at 160

1



                                                                 CDT

 

             Blood        No organisms No growth                 Previous



             Culture-Anaerobic isolated                               preliminar

y



             (test code = 00175-5)                                        verifi

ed result



                                                                 was Culture In



                                                                 Progress on



                                                                 2022 at 190

1



                                                                 CDTPrevious



                                                                 preliminary



                                                                 verified result



                                                                 was No growth a

t



                                                                 24 hours on



                                                                 2022 at 160

1



                                                                 CDTPrevious



                                                                 preliminary



                                                                 verified result



                                                                 was No growth a

t



                                                                 48 hours on



                                                                 2022 at 160

1



                                                                 CDTPrevious



                                                                 preliminary



                                                                 verified result



                                                                 was No growth a

t



                                                                 72 hours on



                                                                 2022 at 160

1



                                                                 CDT

 

             Lab Interpretation Normal                                 



             (test code = 81162-2)                                        



[id number] 
21:01:35





             Test Item    Value        Reference Range Interpretation Comments

 

             Blood Culture-Aerobic No organisms No growth                 Previo

us



             (test code = 17928-3) isolated                               prelim

inary



                                                                 verified result



                                                                 was Culture In



                                                                 Progress on



                                                                 2022 at 190

1



                                                                 CDTPrevious



                                                                 preliminary



                                                                 verified result



                                                                 was No growth a

t



                                                                 24 hours on



                                                                 2022 at 160

1



                                                                 CDTPrevious



                                                                 preliminary



                                                                 verified result



                                                                 was No growth a

t



                                                                 48 hours on



                                                                 2022 at 160

1



                                                                 CDTPrevious



                                                                 preliminary



                                                                 verified result



                                                                 was No growth a

t



                                                                 72 hours on



                                                                 2022 at 160

1



                                                                 CDT

 

             Blood        No organisms No growth                 Previous



             Culture-Anaerobic isolated                               preliminar

y



             (test code = 24590-2)                                        verifi

ed result



                                                                 was Culture In



                                                                 Progress on



                                                                 2022 at 190

1



                                                                 CDTPrevious



                                                                 preliminary



                                                                 verified result



                                                                 was No growth a

t



                                                                 24 hours on



                                                                 2022 at 160

1



                                                                 CDTPrevious



                                                                 preliminary



                                                                 verified result



                                                                 was No growth a

t



                                                                 48 hours on



                                                                 2022 at 160

1



                                                                 CDTPrevious



                                                                 preliminary



                                                                 verified result



                                                                 was No growth a

t



                                                                 72 hours on



                                                                 2022 at 160

1



                                                                 CDT

 

             Lab Interpretation Normal                                 



             (test code = 33323-6)                                        



[facility] Culture - Peripheral Vein #  
21:01:35





             Test Item    Value        Reference Range Interpretation Comments

 

             Blood Culture-Aerobic No organisms No growth                 Previo

us



             (test code = 17928-3) isolated                               prelim

inary



                                                                 verified result



                                                                 was Culture In



                                                                 Progress on



                                                                 2022 at 190

1



                                                                 CDTPrevious



                                                                 preliminary



                                                                 verified result



                                                                 was No growth a

t



                                                                 24 hours on



                                                                 2022 at 160

1



                                                                 CDTPrevious



                                                                 preliminary



                                                                 verified result



                                                                 was No growth a

t



                                                                 48 hours on



                                                                 2022 at 160

1



                                                                 CDTPrevious



                                                                 preliminary



                                                                 verified result



                                                                 was No growth a

t



                                                                 72 hours on



                                                                 2022 at 160

1



                                                                 CDT

 

             Blood        No organisms No growth                 Previous



             Culture-Anaerobic isolated                               preliminar

y



             (test code = 93520-2)                                        verifi

ed result



                                                                 was Culture In



                                                                 Progress on



                                                                 2022 at 190

1



                                                                 CDTPrevious



                                                                 preliminary



                                                                 verified result



                                                                 was No growth a

t



                                                                 24 hours on



                                                                 2022 at 160

1



                                                                 CDTPrevious



                                                                 preliminary



                                                                 verified result



                                                                 was No growth a

t



                                                                 48 hours on



                                                                 2022 at 160

1



                                                                 CDTPrevious



                                                                 preliminary



                                                                 verified result



                                                                 was No growth a

t



                                                                 72 hours on



                                                                 2022 at 160

1



                                                                 CDT

 

             Lab Interpretation Normal                                 



             (test code = 02133-8)                                        



[facility] Culture - Peripheral [id number] 
21:01:35





             Test Item    Value        Reference Range Interpretation Comments

 

             Blood Culture-Aerobic No organisms No growth                 Previo

us



             (test code = 17928-3) isolated                               prelim

inary



                                                                 verified result



                                                                 was Culture In



                                                                 Progress on



                                                                 2022 at 190

1



                                                                 CDTPrevious



                                                                 preliminary



                                                                 verified result



                                                                 was No growth a

t



                                                                 24 hours on



                                                                 2022 at 160

1



                                                                 CDTPrevious



                                                                 preliminary



                                                                 verified result



                                                                 was No growth a

t



                                                                 48 hours on



                                                                 2022 at 160

1



                                                                 CDTPrevious



                                                                 preliminary



                                                                 verified result



                                                                 was No growth a

t



                                                                 72 hours on



                                                                 2022 at 160

1



                                                                 CDT

 

             Blood        No organisms No growth                 Previous



             Culture-Anaerobic isolated                               preliminar

y



             (test code = 81962-3)                                        verifi

ed result



                                                                 was Culture In



                                                                 Progress on



                                                                 2022 at 190

1



                                                                 CDTPrevious



                                                                 preliminary



                                                                 verified result



                                                                 was No growth a

t



                                                                 24 hours on



                                                                 2022 at 160

1



                                                                 CDTPrevious



                                                                 preliminary



                                                                 verified result



                                                                 was No growth a

t



                                                                 48 hours on



                                                                 2022 at 160

1



                                                                 CDTPrevious



                                                                 preliminary



                                                                 verified result



                                                                 was No growth a

t



                                                                 72 hours on



                                                                 2022 at 160

1



                                                                 CDT

 

             Lab Interpretation Normal                                 



             (test code = 76333-3)                                        



[facility] GLUCOSE (AUTOMATED)2022 14:13:49





             Test Item    Value        Reference Range Interpretation Comments

 

             POCT GLU (test code = 8030745026) 176 mg/dL           H      

      

 

             Lab Interpretation (test code = Abnormal                           

    



             30604-6)                                            



[facility] GLUCOSE (AUTOMATED)2022 14:13:49





             Test Item    Value        Reference Range Interpretation Comments

 

             POCT GLU (test code = 6250347909) 176 mg/dL           H      

      

 

             Lab Interpretation (test code = Abnormal                           

    



             01243-3)                                            



[facility] GLUCOSE (AUTOMATED)2022 02:30:54





             Test Item    Value        Reference Range Interpretation Comments

 

             POCT GLU (test code = 8263298582) 113 mg/dL           H      

      

 

             Lab Interpretation (test code = Abnormal                           

    



             43882-0)                                            



[facility] GLUCOSE (AUTOMATED)2022 02:30:54





             Test Item    Value        Reference Range Interpretation Comments

 

             POCT GLU (test code = 8234542120) 113 mg/dL           H      

      

 

             Lab Interpretation (test code = Abnormal                           

    



             72797-9)                                            



[facility] GLUCOSE (AUTOMATED)2022 23:41:12





             Test Item    Value        Reference Range Interpretation Comments

 

             POCT GLU (test code = 3936773147) 135 mg/dL           H      

      

 

             Lab Interpretation (test code = Abnormal                           

    



             37277-0)                                            



[facility] GLUCOSE (AUTOMATED)2022 23:41:12





             Test Item    Value        Reference Range Interpretation Comments

 

             POCT GLU (test code = 0246404125) 135 mg/dL           H      

      

 

             Lab Interpretation (test code = Abnormal                           

    



             28258-2)                                            



[facility] GLUCOSE (AUTOMATED)2022 17:13:18





             Test Item    Value        Reference Range Interpretation Comments

 

             POCT GLU (test code = 5476182331) 238 mg/dL           H      

      

 

             Lab Interpretation (test code = Abnormal                           

    



             57109-5)                                            



[facility] GLUCOSE (AUTOMATED)2022 17:13:18





             Test Item    Value        Reference Range Interpretation Comments

 

             POCT GLU (test code = 9967543165) 238 mg/dL           H      

      

 

             Lab Interpretation (test code = Abnormal                           

    



             19172-9)                                            



[facility] GLUCOSE (AUTOMATED)2022 17:13:18





             Test Item    Value        Reference Range Interpretation Comments

 

             POCT GLU (test code = 3230451827) 238 mg/dL           H      

      

 

             Lab Interpretation (test code = Abnormal                           

    



             25423-3)                                            



[facility]-REACTIVE [id number] 16:50:53





             Test Item    Value        Reference Range Interpretation Comments

 

             CRP (test code = 0.9 mg/dL    See_Comment  H             [Automated

 message]



             [id number])                                         The system [employer]



                                                                 generated this



                                                                 result transmit

huma



                                                                 reference range

:



                                                                 <=0.8. The refe

rence



                                                                 range was not u

sed



                                                                 to interpret th

is



                                                                 result as



                                                                 normal/abnormal

.

 

             Lab Interpretation (test Abnormal                               



             code = 74561-1)                                        



[facility]-REACTIVE [id number] 16:50:53





             Test Item    Value        Reference Range Interpretation Comments

 

             CRP (test code = 0.9 mg/dL    See_Comment  H             [Automated

 message]



             4351301506)                                         The system [employer]



                                                                 generated this



                                                                 result transmit

huma



                                                                 reference range

:



                                                                 <=0.8. The refe

rence



                                                                 range was not u

sed



                                                                 to interpret th

is



                                                                 result as



                                                                 normal/abnormal

.

 

             Lab Interpretation (test Abnormal                               



             code = 23448-0)                                        



[facility]-REACTIVE [id number] 16:50:53





             Test Item    Value        Reference Range Interpretation Comments

 

             CRP (test code = 0.9 mg/dL    See_Comment  H             [Automated

 message]



             [id number])                                         The system [employer]



                                                                 generated this



                                                                 result transmit

huma



                                                                 reference range

:



                                                                 <=0.8. The refe

rence



                                                                 range was not u

sed



                                                                 to interpret th

is



                                                                 result as



                                                                 normal/abnormal

.

 

             Lab Interpretation (test Abnormal                               



             code = 71490-0)                                        



[facility] GLUCOSE (AUTOMATED)2022 15:55:49





             Test Item    Value        Reference Range Interpretation Comments

 

             POCT GLU (test code = 4535768939) 209 mg/dL           H      

      

 

             Lab Interpretation (test code = Abnormal                           

    



             25445-1)                                            



[facility] GLUCOSE (AUTOMATED)2022 15:55:49





             Test Item    Value        Reference Range Interpretation Comments

 

             POCT GLU (test code = 0037693183) 209 mg/dL           H      

      

 

             Lab Interpretation (test code = Abnormal                           

    



             74661-6)                                            



[facility] GLUCOSE (AUTOMATED)2022 00:58:44





             Test Item    Value        Reference Range Interpretation Comments

 

             POCT GLU (test code = 4616772439) 300 mg/dL           H      

      

 

             Lab Interpretation (test code = Abnormal                           

    



             86860-6)                                            



[facility] GLUCOSE (AUTOMATED)2022 00:58:44





             Test Item    Value        Reference Range Interpretation Comments

 

             POCT GLU (test code = 7195869676) 300 mg/dL           H      

      

 

             Lab Interpretation (test code = Abnormal                           

    



             28834-2)                                            



[facility] GLUCOSE (AUTOMATED)2022 21:28:03





             Test Item    Value        Reference Range Interpretation Comments

 

             POCT GLU (test code = 0658729024) 119 mg/dL           H      

      

 

             Lab Interpretation (test code = Abnormal                           

    



             18945-0)                                            



[facility] GLUCOSE (AUTOMATED)2022 21:28:03





             Test Item    Value        Reference Range Interpretation Comments

 

             POCT GLU (test code = 4205880735) 119 mg/dL           H      

      

 

             Lab Interpretation (test code = Abnormal                           

    



             71924-4)                                            



[facility] GLUCOSE (AUTOMATED)2022 16:51:05





             Test Item    Value        Reference Range Interpretation Comments

 

             POCT GLU (test code = 8393824496) 275 mg/dL           H      

      

 

             Lab Interpretation (test code = Abnormal                           

    



             09702-9)                                            



[facility] GLUCOSE (AUTOMATED)2022 16:51:05





             Test Item    Value        Reference Range Interpretation Comments

 

             POCT GLU (test code = 5520976707) 275 mg/dL           H      

      

 

             Lab Interpretation (test code = Abnormal                           

    



             08549-0)                                            



[facility], K, CL, CO2, 
GLUCOSE, BUN, CREATININE, CA)2022 15:51:39





             Test Item    Value        Reference Range Interpretation Comments

 

             NA (test code = 136 mmol/L   135-145                   



             9797814643)                                         

 

             K (test code = 3.7 mmol/L   3.5-5                     



             8902498597)                                         

 

             CL (test code = 111 mmol/L          H            



             3564977575)                                         

 

             CO2 TOTAL (test code = 21 mmol/L    23-31        L            



             4982531115)                                         

 

             AGAP (test code =              2-16                      



             6505785355)                                         

 

             BUN (test code = 9 mg/dL      7-23                      



             8414003934)                                         

 

             GLUCOSE (test code = 278 mg/dL           H            



             3688537997)                                         

 

             CREATININE (test code = 0.46 mg/dL   0.6-1.25     L            



             9919738258)                                         

 

             CALCIUM (test code = 8.2 mg/dL    8.6-10.6     L            



             9982526192)                                         

 

             eGFR (test code =              mL/min/1.73m2              



             2641969137)                                         

 

             MAL (test code = MAL) Association of                           



                          Glomerular Filtration                           



                          Rate (GFR) and Staging                           



                          of Kidney Disease*                           



                          +---------------------                           



                          --+-------------------                           



                          --+-------------------                           



                          ------+| GFR                           



                          (mL/min/1.73 m2) ?|                           



                          With Kidney Damage ?|                           



                          ?Without Kidney                           



                          Damage+---------------                           



                          --------+-------------                           



                          --------+-------------                           



                          ------------+| ?>90 ?                           



                          ? ? ? ? ? ? ? ?|                           



                          ?Stage one ? ? ? ? ?|                           



                          ? Normal ? ? ? ? ? ? ?                           



                          ?+--------------------                           



                          ---+------------------                           



                          ---+------------------                           



                          -------+| ?60-89 ? ? ?                           



                          ? ? ? ? ?| ?Stage two                           



                          ? ? ? ? ?| ? Decreased                           



                          GFR ? ? ? ?                            



                          +---------------------                           



                          --+-------------------                           



                          --+-------------------                           



                          ------+| ?30-59 ? ? ?                           



                          ? ? ? ? ?| ?Stage                           



                          three ? ? ? ?| ? Stage                           



                          three ? ? ? ? ?                           



                          +---------------------                           



                          --+-------------------                           



                          --+-------------------                           



                          ------+| ?15-29 ? ? ?                           



                          ? ? ? ? ?| ?Stage four                           



                          ? ? ? ? | ? Stage four                           



                          ? ? ? ? ?                              



                          ?+--------------------                           



                          ---+------------------                           



                          ---+------------------                           



                          -------+| ?<15 (or                           



                          dialysis) ? ?| ?Stage                           



                          five ? ? ? ? | ? Stage                           



                          five ? ? ? ? ?                           



                          ?+--------------------                           



                          ---+------------------                           



                          ---+------------------                           



                          -------+ *Each stage                           



                          assumes the associated                           



                          GFR level has been in                           



                          effect for at least                           



                          three months. ?Stages                           



                          1 to 5, with or                           



                          without kidney                           



                          disease, indicate                           



                          chronic kidney                           



                          disease. Notes:                           



                          Determination of                           



                          stages one and two                           



                          (with eGFR                             



                          >59mL/min/1.73 m2)                           



                          requires estimation of                           



                          kidney damage for at                           



                          least three months as                           



                          defined by structural                           



                          or functional                           



                          abnormalities of the                           



                          kidney, manifested by                           



                          either:Pathological                           



                          abnormalities or                           



                          Markers of kidney                           



                          damage (including                           



                          abnormalities in the                           



                          composition of the                           



                          blood or urine or                           



                          abnormalities in                           



                          imaging tests).                           

 

             Lab Interpretation Abnormal                               



             (test code = 13601-1)                                        



[facility] METABOLIC PANEL (NA, K, CL, CO2, 
GLUCOSE, BUN, CREATININE, CA)2022 15:51:39





             Test Item    Value        Reference Range Interpretation Comments

 

             NA (test code = 136 mmol/L   135-145                   



             7269551938)                                         

 

             K (test code = 3.7 mmol/L   3.5-5                     



             9392832383)                                         

 

             CL (test code = 111 mmol/L          H            



             8359973084)                                         

 

             CO2 TOTAL (test code = 21 mmol/L    23-31        L            



             7396929067)                                         

 

             AGAP (test code =              2-16                      



             8688684774)                                         

 

             BUN (test code = 9 mg/dL      7-23                      



             7542620551)                                         

 

             GLUCOSE (test code = 278 mg/dL           H            



             1376748878)                                         

 

             CREATININE (test code = 0.46 mg/dL   0.6-1.25     L            



             6710014415)                                         

 

             CALCIUM (test code = 8.2 mg/dL    8.6-10.6     L            



             0786064442)                                         

 

             eGFR (test code =              mL/min/1.73m2              



             4202668625)                                         

 

             MAL (test code = MAL) Association of                           



                          Glomerular Filtration                           



                          Rate (GFR) and Staging                           



                          of Kidney Disease*                           



                          +---------------------                           



                          --+-------------------                           



                          --+-------------------                           



                          ------+| GFR                           



                          (mL/min/1.73 m2) ?|                           



                          With Kidney Damage ?|                           



                          ?Without Kidney                           



                          Damage+---------------                           



                          --------+-------------                           



                          --------+-------------                           



                          ------------+| ?>90 ?                           



                          ? ? ? ? ? ? ? ?|                           



                          ?Stage one ? ? ? ? ?|                           



                          ? Normal ? ? ? ? ? ? ?                           



                          ?+--------------------                           



                          ---+------------------                           



                          ---+------------------                           



                          -------+| ?60-89 ? ? ?                           



                          ? ? ? ? ?| ?Stage two                           



                          ? ? ? ? ?| ? Decreased                           



                          GFR ? ? ? ?                            



                          +---------------------                           



                          --+-------------------                           



                          --+-------------------                           



                          ------+| ?30-59 ? ? ?                           



                          ? ? ? ? ?| ?Stage                           



                          three ? ? ? ?| ? Stage                           



                          three ? ? ? ? ?                           



                          +---------------------                           



                          --+-------------------                           



                          --+-------------------                           



                          ------+| ?15-29 ? ? ?                           



                          ? ? ? ? ?| ?Stage four                           



                          ? ? ? ? | ? Stage four                           



                          ? ? ? ? ?                              



                          ?+--------------------                           



                          ---+------------------                           



                          ---+------------------                           



                          -------+| ?<15 (or                           



                          dialysis) ? ?| ?Stage                           



                          five ? ? ? ? | ? Stage                           



                          five ? ? ? ? ?                           



                          ?+--------------------                           



                          ---+------------------                           



                          ---+------------------                           



                          -------+ *Each stage                           



                          assumes the associated                           



                          GFR level has been in                           



                          effect for at least                           



                          three months. ?Stages                           



                          1 to 5, with or                           



                          without kidney                           



                          disease, indicate                           



                          chronic kidney                           



                          disease. Notes:                           



                          Determination of                           



                          stages one and two                           



                          (with eGFR                             



                          >59mL/min/1.73 m2)                           



                          requires estimation of                           



                          kidney damage for at                           



                          least three months as                           



                          defined by structural                           



                          or functional                           



                          abnormalities of the                           



                          kidney, manifested by                           



                          either:Pathological                           



                          abnormalities or                           



                          Markers of kidney                           



                          damage (including                           



                          abnormalities in the                           



                          composition of the                           



                          blood or urine or                           



                          abnormalities in                           



                          imaging tests).                           

 

             Lab Interpretation Abnormal                               



             (test code = 09970-8)                                        



[facility] GLUCOSE (AUTOMATED)2022 12:36:29





             Test Item    Value        Reference Range Interpretation Comments

 

             POCT GLU (test code = 1194494634) 276 mg/dL           H      

      

 

             Lab Interpretation (test code = Abnormal                           

    



             35507-5)                                            



[facility]





             Test Item    Value        Reference Range Interpretation Comments

 

             POCT GLU (test code = 7635627961) 276 mg/dL           H      

      

 

             Lab Interpretation (test code = Abnormal                           

    



             99753-7)                                            



[facility] GLUCOSE (AUTOMATED)2022 01:29:51





             Test Item    Value        Reference Range Interpretation Comments

 

             POCT GLU (test code = 3327314996) 289 mg/dL           H      

      

 

             Lab Interpretation (test code = Abnormal                           

    



             66588-9)                                            



[facility] GLUCOSE (AUTOMATED)2022 01:29:51





             Test Item    Value        Reference Range Interpretation Comments

 

             POCT GLU (test code = 6100890811) 289 mg/dL           H      

      

 

             Lab Interpretation (test code = Abnormal                           

    



             99873-3)                                            



[facility] GLUCOSE (AUTOMATED)2022 21:24:38





             Test Item    Value        Reference Range Interpretation Comments

 

             POCT GLU (test code = 2257727810) 173 mg/dL           H      

      

 

             Lab Interpretation (test code = Abnormal                           

    



             60678-6)                                            



[facility] GLUCOSE (AUTOMATED)2022 21:24:38





             Test Item    Value        Reference Range Interpretation Comments

 

             POCT GLU (test code = 3217776237) 173 mg/dL           H      

      

 

             Lab Interpretation (test code = Abnormal                           

    



             22506-9)                                            



[facility] GLUCOSE (AUTOMATED)2022 16:50:49





             Test Item    Value        Reference Range Interpretation Comments

 

             POCT GLU (test code = 3389139351) 279 mg/dL           H      

      

 

             Lab Interpretation (test code = Abnormal                           

    



             43017-3)                                            



[facility]





             Test Item    Value        Reference Range Interpretation Comments

 

             POCT GLU (test code = 4548115485) 279 mg/dL           H      

      

 

             Lab Interpretation (test code = Abnormal                           

    



             49512-8)                                            



[facility] GLUCOSE (AUTOMATED)2022 13:31:23





             Test Item    Value        Reference Range Interpretation Comments

 

             POCT GLU (test code = 5266081419) 281 mg/dL           H      

      

 

             Lab Interpretation (test code = Abnormal                           

    



             42764-7)                                            



[facility]23





             Test Item    Value        Reference Range Interpretation Comments

 

             POCT GLU (test code = 3663917425) 281 mg/dL           H      

      

 

             Lab Interpretation (test code = Abnormal                           

    



             40474-5)                                            



[facility] GLUCOSE (AUTOMATED)2022 10:02:35





             Test Item    Value        Reference Range Interpretation Comments

 

             POCT GLU (test code = 3756174087) 339 mg/dL           H      

      

 

             Lab Interpretation (test code = Abnormal                           

    



             13552-1)                                            



[facility] GLUCOSE (AUTOMATED)2022 10:02:35





             Test Item    Value        Reference Range Interpretation Comments

 

             POCT GLU (test code = 5845445483) 339 mg/dL           H      

      

 

             Lab Interpretation (test code = Abnormal                           

    



             24864-2)                                            



[facility] GLUCOSE (AUTOMATED)2022 06:33:22





             Test Item    Value        Reference Range Interpretation Comments

 

             POCT GLU (test code = 5136017391) 295 mg/dL           H      

      

 

             Lab Interpretation (test code = Abnormal                           

    



             69959-4)                                            



[facility] GLUCOSE (AUTOMATED)2022 06:33:22





             Test Item    Value        Reference Range Interpretation Comments

 

             POCT GLU (test code = 4247811960) 295 mg/dL           H      

      

 

             Lab Interpretation (test code = Abnormal                           

    



             49387-7)                                            



[facility] GLUCOSE (AUTOMATED)2022 01:54:18





             Test Item    Value        Reference Range Interpretation Comments

 

             POCT GLU (test code = 8801699666) 258 mg/dL           H      

      

 

             Lab Interpretation (test code = Abnormal                           

    



             32993-1)                                            



[facility] GLUCOSE (AUTOMATED)2022 01:54:18





             Test Item    Value        Reference Range Interpretation Comments

 

             POCT GLU (test code = 6130512278) 258 mg/dL           H      

      

 

             Lab Interpretation (test code = Abnormal                           

    



             79932-3)                                            



[facility] GLUCOSE (AUTOMATED)2022 23:03:09





             Test Item    Value        Reference Range Interpretation Comments

 

             POCT GLU (test code = 0684627697) 286 mg/dL           H      

      

 

             Lab Interpretation (test code = Abnormal                           

    



             85647-2)                                            



[facility] GLUCOSE (AUTOMATED)2022 23:03:09





             Test Item    Value        Reference Range Interpretation Comments

 

             POCT GLU (test code = 6281334892) 286 mg/dL           H      

      

 

             Lab Interpretation (test code = Abnormal                           

    



             26386-1)                                            



[facility] GLUCOSE (AUTOMATED)2022 20:48:08





             Test Item    Value        Reference Range Interpretation Comments

 

             POCT GLU (test code = 1316349031) 249 mg/dL           H      

      

 

             Lab Interpretation (test code = Abnormal                           

    



             86263-6)                                            



[facility] GLUCOSE (AUTOMATED)2022 20:48:08





             Test Item    Value        Reference Range Interpretation Comments

 

             POCT GLU (test code = 6222390864) 249 mg/dL           H      

      

 

             Lab Interpretation (test code = Abnormal                           

    



             41299-1)                                            



[id number]01:47





             Test Item    Value        Reference Range Interpretation Comments

 

             BOH (test code = 1.0 mmol/L                             



             9440775743)                                         

 

             MAL (test code = Normal Ranges: ? ?                           



             MAL)         Nonfasting ? ? ? ? ? Less                           



                          than 0.1 mmol/L ? ?                           



                          Overnight Fast ? ? ? Less                           



                          than 0.4 mmol/L ? ? Fasting                           



                          (1-2 weeks) ?6-8 mmol/L Test                          

 



                          developed and                           



                          characteristics determined                           



                          by [facility].                          

 



[id number] 17:01:47





             Test Item    Value        Reference Range Interpretation Comments

 

             BOH (test code = 1.0 mmol/L                             



             6890174337)                                         

 

             MAL (test code = Normal Ranges: ? ?                           



             MAL)         Nonfasting ? ? ? ? ? Less                           



                          than 0.1 mmol/L ? ?                           



                          Overnight Fast ? ? ? Less                           



                          than 0.4 mmol/L ? ? Fasting                           



                          (1-2 weeks) ?6-8 mmol/L Test                          

 



                          developed and                           



                          characteristics determined                           



                          by [facility].                          

 



[id number] 17:01:47





             Test Item    Value        Reference Range Interpretation Comments

 

             BOH (test code = 1.0 mmol/L                             



             7923617526)                                         

 

             MAL (test code = Normal Ranges: ? ?                           



             MAL)         Nonfasting ? ? ? ? ? Less                           



                          than 0.1 mmol/L ? ?                           



                          Overnight Fast ? ? ? Less                           



                          than 0.4 mmol/L ? ? Fasting                           



                          (1-2 weeks) ?6-8 mmol/L Test                          

 



                          developed and                           



                          characteristics determined                           



                          by [facility].                          

 



[facility] GLUCOSE (AUTOMATED)2022 16:42:59





             Test Item    Value        Reference Range Interpretation Comments

 

             POCT GLU (test code = 6388417383) 264 mg/dL           H      

      

 

             Lab Interpretation (test code = Abnormal                           

    



             68706-2)                                            



[facility] GLUCOSE (AUTOMATED)2022 16:42:59





             Test Item    Value        Reference Range Interpretation Comments

 

             POCT GLU (test code = 6522175515) 264 mg/dL           H      

      

 

             Lab Interpretation (test code = Abnormal                           

    



             30673-1)                                            



[facility] Metabolic Panel (Na, K, Cl, CO2, 
Glucose, BUN, Creatinine, Ca)2022 16:16:20





             Test Item    Value        Reference Range Interpretation Comments

 

             NA (test code = 137 mmol/L   135-145                   



             9945530840)                                         

 

             K (test code = 3.5 mmol/L   3.5-5                     



             8512307749)                                         

 

             CL (test code = 115 mmol/L          H            



             4062495330)                                         

 

             CO2 TOTAL (test code = 17 mmol/L    23-31        L            



             7634071085)                                         

 

             AGAP (test code =              2-16                      



             0348347076)                                         

 

             BUN (test code = 5 mg/dL      7-23         L            



             5985221007)                                         

 

             GLUCOSE (test code = 271 mg/dL           H            



             8189940452)                                         

 

             CREATININE (test code = 0.45 mg/dL   0.6-1.25     L            



             0671723068)                                         

 

             CALCIUM (test code = 8.5 mg/dL    8.6-10.6     L            



             3547845237)                                         

 

             eGFR (test code =              mL/min/1.73m2              



             5319093137)                                         

 

             MAL (test code = MAL) Association of                           



                          Glomerular Filtration                           



                          Rate (GFR) and Staging                           



                          of Kidney Disease*                           



                          +---------------------                           



                          --+-------------------                           



                          --+-------------------                           



                          ------+| GFR                           



                          (mL/min/1.73 m2) ?|                           



                          With Kidney Damage ?|                           



                          ?Without Kidney                           



                          Damage+---------------                           



                          --------+-------------                           



                          --------+-------------                           



                          ------------+| ?>90 ?                           



                          ? ? ? ? ? ? ? ?|                           



                          ?Stage one ? ? ? ? ?|                           



                          ? Normal ? ? ? ? ? ? ?                           



                          ?+--------------------                           



                          ---+------------------                           



                          ---+------------------                           



                          -------+| ?60-89 ? ? ?                           



                          ? ? ? ? ?| ?Stage two                           



                          ? ? ? ? ?| ? Decreased                           



                          GFR ? ? ? ?                            



                          +---------------------                           



                          --+-------------------                           



                          --+-------------------                           



                          ------+| ?30-59 ? ? ?                           



                          ? ? ? ? ?| ?Stage                           



                          three ? ? ? ?| ? Stage                           



                          three ? ? ? ? ?                           



                          +---------------------                           



                          --+-------------------                           



                          --+-------------------                           



                          ------+| ?15-29 ? ? ?                           



                          ? ? ? ? ?| ?Stage four                           



                          ? ? ? ? | ? Stage four                           



                          ? ? ? ? ?                              



                          ?+--------------------                           



                          ---+------------------                           



                          ---+------------------                           



                          -------+| ?<15 (or                           



                          dialysis) ? ?| ?Stage                           



                          five ? ? ? ? | ? Stage                           



                          five ? ? ? ? ?                           



                          ?+--------------------                           



                          ---+------------------                           



                          ---+------------------                           



                          -------+ *Each stage                           



                          assumes the associated                           



                          GFR level has been in                           



                          effect for at least                           



                          three months. ?Stages                           



                          1 to 5, with or                           



                          without kidney                           



                          disease, indicate                           



                          chronic kidney                           



                          disease. Notes:                           



                          Determination of                           



                          stages one and two                           



                          (with eGFR                             



                          >59mL/min/1.73 m2)                           



                          requires estimation of                           



                          kidney damage for at                           



                          least three months as                           



                          defined by structural                           



                          or functional                           



                          abnormalities of the                           



                          kidney, manifested by                           



                          either:Pathological                           



                          abnormalities or                           



                          Markers of kidney                           



                          damage (including                           



                          abnormalities in the                           



                          composition of the                           



                          blood or urine or                           



                          abnormalities in                           



                          imaging tests).                           

 

             Lab Interpretation Abnormal                               



             (test code = 45582-2)                                        



[facility] Metabolic Panel (Na, K, Cl, CO2, 
Glucose, BUN, Creatinine, Ca)2022 16:16:20





             Test Item    Value        Reference Range Interpretation Comments

 

             NA (test code = 137 mmol/L   135-145                   



             5982475517)                                         

 

             K (test code = 3.5 mmol/L   3.5-5                     



             2466382821)                                         

 

             CL (test code = 115 mmol/L          H            



             1260928009)                                         

 

             CO2 TOTAL (test code = 17 mmol/L    23-31        L            



             1104314289)                                         

 

             AGAP (test code =              2-16                      



             0345107949)                                         

 

             BUN (test code = 5 mg/dL      7-23         L            



             3143286655)                                         

 

             GLUCOSE (test code = 271 mg/dL           H            



             7329576782)                                         

 

             CREATININE (test code = 0.45 mg/dL   0.6-1.25     L            



             4655574911)                                         

 

             CALCIUM (test code = 8.5 mg/dL    8.6-10.6     L            



             1842882879)                                         

 

             eGFR (test code =              mL/min/1.73m2              



             5928898556)                                         

 

             MAL (test code = MAL) Association of                           



                          Glomerular Filtration                           



                          Rate (GFR) and Staging                           



                          of Kidney Disease*                           



                          +---------------------                           



                          --+-------------------                           



                          --+-------------------                           



                          ------+| GFR                           



                          (mL/min/1.73 m2) ?|                           



                          With Kidney Damage ?|                           



                          ?Without Kidney                           



                          Damage+---------------                           



                          --------+-------------                           



                          --------+-------------                           



                          ------------+| ?>90 ?                           



                          ? ? ? ? ? ? ? ?|                           



                          ?Stage one ? ? ? ? ?|                           



                          ? Normal ? ? ? ? ? ? ?                           



                          ?+--------------------                           



                          ---+------------------                           



                          ---+------------------                           



                          -------+| ?60-89 ? ? ?                           



                          ? ? ? ? ?| ?Stage two                           



                          ? ? ? ? ?| ? Decreased                           



                          GFR ? ? ? ?                            



                          +---------------------                           



                          --+-------------------                           



                          --+-------------------                           



                          ------+| ?30-59 ? ? ?                           



                          ? ? ? ? ?| ?Stage                           



                          three ? ? ? ?| ? Stage                           



                          three ? ? ? ? ?                           



                          +---------------------                           



                          --+-------------------                           



                          --+-------------------                           



                          ------+| ?15-29 ? ? ?                           



                          ? ? ? ? ?| ?Stage four                           



                          ? ? ? ? | ? Stage four                           



                          ? ? ? ? ?                              



                          ?+--------------------                           



                          ---+------------------                           



                          ---+------------------                           



                          -------+| ?<15 (or                           



                          dialysis) ? ?| ?Stage                           



                          five ? ? ? ? | ? Stage                           



                          five ? ? ? ? ?                           



                          ?+--------------------                           



                          ---+------------------                           



                          ---+------------------                           



                          -------+ *Each stage                           



                          assumes the associated                           



                          GFR level has been in                           



                          effect for at least                           



                          three months. ?Stages                           



                          1 to 5, with or                           



                          without kidney                           



                          disease, indicate                           



                          chronic kidney                           



                          disease. Notes:                           



                          Determination of                           



                          stages one and two                           



                          (with eGFR                             



                          >59mL/min/1.73 m2)                           



                          requires estimation of                           



                          kidney damage for at                           



                          least three months as                           



                          defined by structural                           



                          or functional                           



                          abnormalities of the                           



                          kidney, manifested by                           



                          either:Pathological                           



                          abnormalities or                           



                          Markers of kidney                           



                          damage (including                           



                          abnormalities in the                           



                          composition of the                           



                          blood or urine or                           



                          abnormalities in                           



                          imaging tests).                           

 

             Lab Interpretation Abnormal                               



             (test code = 30227-3)                                        



[facility] GLUCOSE (AUTOMATED)2022 14:21:27





             Test Item    Value        Reference Range Interpretation Comments

 

             POCT GLU (test code = 5594604573) 286 mg/dL           H      

      

 

             Lab Interpretation (test code = Abnormal                           

    



             69596-7)                                            



[facility] GLUCOSE (AUTOMATED)2022 14:21:27





             Test Item    Value        Reference Range Interpretation Comments

 

             POCT GLU (test code = 4783279149) 286 mg/dL           H      

      

 

             Lab Interpretation (test code = Abnormal                           

    



             33776-2)                                            



[facility] 
[id number] 13:29:25





             Test Item    Value        Reference Range Interpretation Comments

 

             Coagulase Negative Positive     Negative, See A            



             Staphylococcus (test              Comment/Narrative              



             code = 64922-1)                                        

 

             MAL (test code = MAL)  Coagulase negative                          

 



                          Staphylococcus (CoNS)                           



                          detected by DNA probe.                           



                          ?CoNS often                            



                          contaminate blood                           



                          cultures from skin                           



                          colonization during                           



                          phlebotomy. ?Preferred                           



                          management is to                           



                          repeat blood cultures,                           



                          and monitor off                           



                          antibiotics.                           



                          ?Contamination is                           



                          suggested by culture                           



                          growth after 48 hours,                           



                          or growth in single                           



                          culture (i.e., one of                           



                          two sets). ?True                           



                          bacteremia is                           



                          suggested by the                           



                          fever, hypotension,                           



                          and leukocytosis that                           



                          are not explained by                           



                          an alternative                           



                          infection, or                           



                          indwelling foreign                           



                          devices that appear                           



                          infected (catheters,                           



                          lines, or prostheses).                           



                          Consider Infectious                           



                          Diseases consultation                           



                          if differentiation of                           



                          CoNS bacteremia from                           



                          contamination is                           



                          uncertain. If clinical                           



                          context suggests true                           



                          bacteremia, preferred                           



                          therapy is vancomycin.                           



                          Please contact the                           



                          Antimicrobial                           



                          Stewardship Program                           



                          with questions.Pager:                           



                          [phone] Testing                           



                          included eleven                           



                          identification and                           



                          three resistance                           



                          marker                                 



                          targets.______________                           



                          ______________________                           



                          ______________________                           



                          _____________                           

 

             Lab Interpretation Abnormal                               



             (test code = 90458-1)                                        



[facility] 
[id number] 13:29:25





             Test Item    Value        Reference Range Interpretation Comments

 

             Coagulase Negative Positive     Negative, See A            



             Staphylococcus (test              Comment/Narrative              



             code = 26924-0)                                        

 

             MAL (test code = MAL)  Coagulase negative                          

 



                          Staphylococcus (CoNS)                           



                          detected by DNA probe.                           



                          ?CoNS often                            



                          contaminate blood                           



                          cultures from skin                           



                          colonization during                           



                          phlebotomy. ?Preferred                           



                          management is to                           



                          repeat blood cultures,                           



                          and monitor off                           



                          antibiotics.                           



                          ?Contamination is                           



                          suggested by culture                           



                          growth after 48 hours,                           



                          or growth in single                           



                          culture (i.e., one of                           



                          two sets). ?True                           



                          bacteremia is                           



                          suggested by the                           



                          fever, hypotension,                           



                          and leukocytosis that                           



                          are not explained by                           



                          an alternative                           



                          infection, or                           



                          indwelling foreign                           



                          devices that appear                           



                          infected (catheters,                           



                          lines, or prostheses).                           



                          Consider Infectious                           



                          Diseases consultation                           



                          if differentiation of                           



                          CoNS bacteremia from                           



                          contamination is                           



                          uncertain. If clinical                           



                          context suggests true                           



                          bacteremia, preferred                           



                          therapy is vancomycin.                           



                          Please contact the                           



                          Antimicrobial                           



                          Stewardship Program                           



                          with questions.Pager:                           



                          [phone] Testing                           



                          included eleven                           



                          identification and                           



                          three resistance                           



                          marker                                 



                          targets.______________                           



                          ______________________                           



                          ______________________                           



                          _____________                           

 

             Lab Interpretation Abnormal                               



             (test code = 64734-3)                                        



[facility] 
[id number] 13:29:25





             Test Item    Value        Reference Range Interpretation Comments

 

             Coagulase Negative Positive     Negative, See A            



             Staphylococcus (test              Comment/Narrative              



             code = 84790-0)                                        

 

             MAL (test code = MAL)  Coagulase negative                          

 



                          Staphylococcus (CoNS)                           



                          detected by DNA probe.                           



                          ?CoNS often                            



                          contaminate blood                           



                          cultures from skin                           



                          colonization during                           



                          phlebotomy. ?Preferred                           



                          management is to                           



                          repeat blood cultures,                           



                          and monitor off                           



                          antibiotics.                           



                          ?Contamination is                           



                          suggested by culture                           



                          growth after 48 hours,                           



                          or growth in single                           



                          culture (i.e., one of                           



                          two sets). ?True                           



                          bacteremia is                           



                          suggested by the                           



                          fever, hypotension,                           



                          and leukocytosis that                           



                          are not explained by                           



                          an alternative                           



                          infection, or                           



                          indwelling foreign                           



                          devices that appear                           



                          infected (catheters,                           



                          lines, or prostheses).                           



                          Consider Infectious                           



                          Diseases consultation                           



                          if differentiation of                           



                          CoNS bacteremia from                           



                          contamination is                           



                          uncertain. If clinical                           



                          context suggests true                           



                          bacteremia, preferred                           



                          therapy is vancomycin.                           



                          Please contact the                           



                          Antimicrobial                           



                          Stewardship Program                           



                          with questions.Pager:                           



                          [phone] Testing                           



                          included eleven                           



                          identification and                           



                          three resistance                           



                          marker                                 



                          targets.______________                           



                          ______________________                           



                          ______________________                           



                          _____________                           

 

             Lab Interpretation Abnormal                               



             (test code = 33367-9)                                        



[facility] GLUCOSE (AUTOMATED)2022 13:03:22





             Test Item    Value        Reference Range Interpretation Comments

 

             POCT GLU (test code = 2612258962) 307 mg/dL           H      

      

 

             Lab Interpretation (test code = Abnormal                           

    



             68850-8)                                            



[facility] GLUCOSE (AUTOMATED)2022 13:03:22





             Test Item    Value        Reference Range Interpretation Comments

 

             POCT GLU (test code = 4330416267) 307 mg/dL           H      

      

 

             Lab Interpretation (test code = Abnormal                           

    



             21064-3)                                            



[facility] GLUCOSE (AUTOMATED)2022 09:05:19





             Test Item    Value        Reference Range Interpretation Comments

 

             POCT GLU (test code = 9712520478) 326 mg/dL           H      

      

 

             Lab Interpretation (test code = Abnormal                           

    



             77053-5)                                            



[facility] GLUCOSE (AUTOMATED)2022 09:05:19





             Test Item    Value        Reference Range Interpretation Comments

 

             POCT GLU (test code = 2286719814) 326 mg/dL           H      

      

 

             Lab Interpretation (test code = Abnormal                           

    



             67324-0)                                            



[facility] GLUCOSE (AUTOMATED)2022 05:46:58





             Test Item    Value        Reference Range Interpretation Comments

 

             POCT GLU (test code = 8318110766) 341 mg/dL           H      

      

 

             Lab Interpretation (test code = Abnormal                           

    



             21826-3)                                            



[facility] GLUCOSE (AUTOMATED)2022 05:46:58





             Test Item    Value        Reference Range Interpretation Comments

 

             POCT GLU (test code = 7906726125) 341 mg/dL           H      

      

 

             Lab Interpretation (test code = Abnormal                           

    



             72838-8)                                            



[facility] GLUCOSE (AUTOMATED)2022 01:37:20





             Test Item    Value        Reference Range Interpretation Comments

 

             POCT GLU (test code = 2804736747) 196 mg/dL           H      

      

 

             Lab Interpretation (test code = Abnormal                           

    



             52275-1)                                            



[facility] GLUCOSE (AUTOMATED)2022 01:37:20





             Test Item    Value        Reference Range Interpretation Comments

 

             POCT GLU (test code = 1586852198) 196 mg/dL           H      

      

 

             Lab Interpretation (test code = Abnormal                           

    



             45573-7)                                            



[facility] GLUCOSE (AUTOMATED)2022 23:44:41





             Test Item    Value        Reference Range Interpretation Comments

 

             POCT GLU (test code = 0283730841) 138 mg/dL           H      

      

 

             Lab Interpretation (test code = Abnormal                           

    



             07529-8)                                            



[facility] GLUCOSE (AUTOMATED)2022 23:44:41





             Test Item    Value        Reference Range Interpretation Comments

 

             POCT GLU (test code = 0868298740) 138 mg/dL           H      

      

 

             Lab Interpretation (test code = Abnormal                           

    



             61226-3)                                            



[facility] Metabolic Panel (Na, K, Cl, CO2, 
Glucose, BUN, Creatinine, Ca)2022 23:12:45





             Test Item    Value        Reference Range Interpretation Comments

 

             NA (test code = 138 mmol/L   135-145                   



             8905662491)                                         

 

             K (test code = 3.7 mmol/L   3.5-5                     



             8294142260)                                         

 

             CL (test code = 115 mmol/L          H            



             3388602859)                                         

 

             CO2 TOTAL (test code = 17 mmol/L    23-31        L            



             1850010150)                                         

 

             AGAP (test code =              2-16                      



             7106951530)                                         

 

             BUN (test code = 5 mg/dL      7-23         L            



             8431958884)                                         

 

             GLUCOSE (test code = 86 mg/dL                         



             1165391339)                                         

 

             CREATININE (test code = 0.46 mg/dL   0.6-1.25     L            



             9518497487)                                         

 

             CALCIUM (test code = 8.4 mg/dL    8.6-10.6     L            



             7288802237)                                         

 

             eGFR (test code =              mL/min/1.73m2              



             3958156063)                                         

 

             MAL (test code = MAL) Association of                           



                          Glomerular Filtration                           



                          Rate (GFR) and Staging                           



                          of Kidney Disease*                           



                          +---------------------                           



                          --+-------------------                           



                          --+-------------------                           



                          ------+| GFR                           



                          (mL/min/1.73 m2) ?|                           



                          With Kidney Damage ?|                           



                          ?Without Kidney                           



                          Damage+---------------                           



                          --------+-------------                           



                          --------+-------------                           



                          ------------+| ?>90 ?                           



                          ? ? ? ? ? ? ? ?|                           



                          ?Stage one ? ? ? ? ?|                           



                          ? Normal ? ? ? ? ? ? ?                           



                          ?+--------------------                           



                          ---+------------------                           



                          ---+------------------                           



                          -------+| ?60-89 ? ? ?                           



                          ? ? ? ? ?| ?Stage two                           



                          ? ? ? ? ?| ? Decreased                           



                          GFR ? ? ? ?                            



                          +---------------------                           



                          --+-------------------                           



                          --+-------------------                           



                          ------+| ?30-59 ? ? ?                           



                          ? ? ? ? ?| ?Stage                           



                          three ? ? ? ?| ? Stage                           



                          three ? ? ? ? ?                           



                          +---------------------                           



                          --+-------------------                           



                          --+-------------------                           



                          ------+| ?15-29 ? ? ?                           



                          ? ? ? ? ?| ?Stage four                           



                          ? ? ? ? | ? Stage four                           



                          ? ? ? ? ?                              



                          ?+--------------------                           



                          ---+------------------                           



                          ---+------------------                           



                          -------+| ?<15 (or                           



                          dialysis) ? ?| ?Stage                           



                          five ? ? ? ? | ? Stage                           



                          five ? ? ? ? ?                           



                          ?+--------------------                           



                          ---+------------------                           



                          ---+------------------                           



                          -------+ *Each stage                           



                          assumes the associated                           



                          GFR level has been in                           



                          effect for at least                           



                          three months. ?Stages                           



                          1 to 5, with or                           



                          without kidney                           



                          disease, indicate                           



                          chronic kidney                           



                          disease. Notes:                           



                          Determination of                           



                          stages one and two                           



                          (with eGFR                             



                          >59mL/min/1.73 m2)                           



                          requires estimation of                           



                          kidney damage for at                           



                          least three months as                           



                          defined by structural                           



                          or functional                           



                          abnormalities of the                           



                          kidney, manifested by                           



                          either:Pathological                           



                          abnormalities or                           



                          Markers of kidney                           



                          damage (including                           



                          abnormalities in the                           



                          composition of the                           



                          blood or urine or                           



                          abnormalities in                           



                          imaging tests).                           

 

             Lab Interpretation Abnormal                               



             (test code = 11702-5)                                        



[facility] Metabolic Panel (Na, K, Cl, CO2, 
Glucose, BUN, Creatinine, Ca)2022 23:12:45





             Test Item    Value        Reference Range Interpretation Comments

 

             NA (test code = 138 mmol/L   135-145                   



             [id number])                                         

 

             K (test code = 3.7 mmol/L   3.5-5                     



             5810377338)                                         

 

             CL (test code = 115 mmol/L          H            



             8708791497)                                         

 

             CO2 TOTAL (test code = 17 mmol/L    23-31        L            



             7898210351)                                         

 

             AGAP (test code =              2-16                      



             8970987042)                                         

 

             BUN (test code = 5 mg/dL      7-23         L            



             0394510642)                                         

 

             GLUCOSE (test code = 86 mg/dL                         



             5197499461)                                         

 

             CREATININE (test code = 0.46 mg/dL   0.6-1.25     L            



             7825793473)                                         

 

             CALCIUM (test code = 8.4 mg/dL    8.6-10.6     L            



             6370841029)                                         

 

             eGFR (test code =              mL/min/1.73m2              



             8697774730)                                         

 

             MAL (test code = MAL) Association of                           



                          Glomerular Filtration                           



                          Rate (GFR) and Staging                           



                          of Kidney Disease*                           



                          +---------------------                           



                          --+-------------------                           



                          --+-------------------                           



                          ------+| GFR                           



                          (mL/min/1.73 m2) ?|                           



                          With Kidney Damage ?|                           



                          ?Without Kidney                           



                          Damage+---------------                           



                          --------+-------------                           



                          --------+-------------                           



                          ------------+| ?>90 ?                           



                          ? ? ? ? ? ? ? ?|                           



                          ?Stage one ? ? ? ? ?|                           



                          ? Normal ? ? ? ? ? ? ?                           



                          ?+--------------------                           



                          ---+------------------                           



                          ---+------------------                           



                          -------+| ?60-89 ? ? ?                           



                          ? ? ? ? ?| ?Stage two                           



                          ? ? ? ? ?| ? Decreased                           



                          GFR ? ? ? ?                            



                          +---------------------                           



                          --+-------------------                           



                          --+-------------------                           



                          ------+| ?30-59 ? ? ?                           



                          ? ? ? ? ?| ?Stage                           



                          three ? ? ? ?| ? Stage                           



                          three ? ? ? ? ?                           



                          +---------------------                           



                          --+-------------------                           



                          --+-------------------                           



                          ------+| ?15-29 ? ? ?                           



                          ? ? ? ? ?| ?Stage four                           



                          ? ? ? ? | ? Stage four                           



                          ? ? ? ? ?                              



                          ?+--------------------                           



                          ---+------------------                           



                          ---+------------------                           



                          -------+| ?<15 (or                           



                          dialysis) ? ?| ?Stage                           



                          five ? ? ? ? | ? Stage                           



                          five ? ? ? ? ?                           



                          ?+--------------------                           



                          ---+------------------                           



                          ---+------------------                           



                          -------+ *Each stage                           



                          assumes the associated                           



                          GFR level has been in                           



                          effect for at least                           



                          three months. ?Stages                           



                          1 to 5, with or                           



                          without kidney                           



                          disease, indicate                           



                          chronic kidney                           



                          disease. Notes:                           



                          Determination of                           



                          stages one and two                           



                          (with eGFR                             



                          >59mL/min/1.73 m2)                           



                          requires estimation of                           



                          kidney damage for at                           



                          least three months as                           



                          defined by structural                           



                          or functional                           



                          abnormalities of the                           



                          kidney, manifested by                           



                          either:Pathological                           



                          abnormalities or                           



                          Markers of kidney                           



                          damage (including                           



                          abnormalities in the                           



                          composition of the                           



                          blood or urine or                           



                          abnormalities in                           



                          imaging tests).                           

 

             Lab Interpretation Abnormal                               



             (test code = 90050-0)                                        



[facility] GLUCOSE (AUTOMATED)2022 22:38:52





             Test Item    Value        Reference Range Interpretation Comments

 

             POCT GLU (test code = 7020757087) 94 mg/dL                   

      

 

             Lab Interpretation (test code = Normal                             

    



             29526-2)                                            



[facility] GLUCOSE (AUTOMATED)2022 22:38:52





             Test Item    Value        Reference Range Interpretation Comments

 

             POCT GLU (test code = 9004682496) 94 mg/dL                   

      

 

             Lab Interpretation (test code = Normal                             

    



             96623-1)                                            



[facility] GLUCOSE (AUTOMATED)2022 21:31:35





             Test Item    Value        Reference Range Interpretation Comments

 

             POCT GLU (test code = 9054257812) 119 mg/dL           H      

      

 

             Lab Interpretation (test code = Abnormal                           

    



             70201-8)                                            



[facility] GLUCOSE (AUTOMATED)2022 21:31:35





             Test Item    Value        Reference Range Interpretation Comments

 

             POCT GLU (test code = 3321122591) 119 mg/dL           H      

      

 

             Lab Interpretation (test code = Abnormal                           

    



             28350-6)                                            



[facility] GLUCOSE (AUTOMATED)2022 20:06:03





             Test Item    Value        Reference Range Interpretation Comments

 

             POCT GLU (test code = 3633378052) 177 mg/dL           H      

      

 

             Lab Interpretation (test code = Abnormal                           

    



             37085-4)                                            



[facility] GLUCOSE (AUTOMATED)2022 20:06:03





             Test Item    Value        Reference Range Interpretation Comments

 

             POCT GLU (test code = 1538771426) 177 mg/dL           H      

      

 

             Lab Interpretation (test code = Abnormal                           

    



             86409-6)                                            



[facility] GLUCOSE (AUTOMATED)2022 19:07:20





             Test Item    Value        Reference Range Interpretation Comments

 

             POCT GLU (test code = 9830004608) 185 mg/dL           H      

      

 

             Lab Interpretation (test code = Abnormal                           

    



             57718-0)                                            



[facility] GLUCOSE (AUTOMATED)2022 19:07:20





             Test Item    Value        Reference Range Interpretation Comments

 

             POCT GLU (test code = 0300588807) 185 mg/dL           H      

      

 

             Lab Interpretation (test code = Abnormal                           

    



             22174-2)                                            



[facility] -74-33 18:44:53





             Test Item    Value        Reference    Interpretation Comments



                                       Range                     

 

             TROPONIN I (test 0.000 ng/mL  See_Comment                [Automated



             code = 0035619293)                                        message] 

The



                                                                 system which



                                                                 generated this



                                                                 result



                                                                 transmitted



                                                                 reference range

:



                                                                 <=0.034. The



                                                                 reference range



                                                                 was not used to



                                                                 interpret this



                                                                 result as



                                                                 normal/abnormal

.

 

             MAL (test code = Reference (Normal)                           



             MAL)         Range (defined by                           



                          the 99th percentile                           



                          reference limit): <=                           



                          0.034 ng/mL Note:                           



                          Cardiac troponin                           



                          begins to rise 3-4                           



                          hours after the                           



                          onset of ischemia.                           



                          Repeat in 4-6 hours                           



                          if the sample was                           



                          drawn within 3-4                           



                          hours of the onset                           



                          of the symptom and                           



                          found normal.                           



                          Diagnosis of                           



                          myocardial injury is                           



                          made with acute                           



                          changes in cTn                           



                          concentrations with                           



                          at least one serial                           



                          sample above the                           



                          99th percentile                           



                          upper reference                           



                          limit (URL), taken                           



                          together with the                           



                          patient's clinical                           



                          presentation. Biotin                           



                          has been reported to                           



                          cause a negative                           



                          bias, interpret                           



                          results relative to                           



                          patient's use of                           



                          biotin.                                

 

             Lab Interpretation Normal                                 



             (test code =                                        



             73382-3)                                            



[facility] -91-95 18:44:53





             Test Item    Value        Reference    Interpretation Comments



                                       Range                     

 

             TROPONIN I (test 0.000 ng/mL  See_Comment                [Automated



             code = 4390440982)                                        message] 

The



                                                                 system which



                                                                 generated this



                                                                 result



                                                                 transmitted



                                                                 reference range

:



                                                                 <=0.034. The



                                                                 reference range



                                                                 was not used to



                                                                 interpret this



                                                                 result as



                                                                 normal/abnormal

.

 

             MAL (test code = Reference (Normal)                           



             MAL)         Range (defined by                           



                          the 99th percentile                           



                          reference limit): <=                           



                          0.034 ng/mL Note:                           



                          Cardiac troponin                           



                          begins to rise 3-4                           



                          hours after the                           



                          onset of ischemia.                           



                          Repeat in 4-6 hours                           



                          if the sample was                           



                          drawn within 3-4                           



                          hours of the onset                           



                          of the symptom and                           



                          found normal.                           



                          Diagnosis of                           



                          myocardial injury is                           



                          made with acute                           



                          changes in cTn                           



                          concentrations with                           



                          at least one serial                           



                          sample above the                           



                          99th percentile                           



                          upper reference                           



                          limit (URL), taken                           



                          together with the                           



                          patient's clinical                           



                          presentation. Biotin                           



                          has been reported to                           



                          cause a negative                           



                          bias, interpret                           



                          results relative to                           



                          patient's use of                           



                          biotin.                                

 

             Lab Interpretation Normal                                 



             (test code =                                        



             25794-0)                                            



[facility] -20-92 18:44:53





             Test Item    Value        Reference    Interpretation Comments



                                       Range                     

 

             TROPONIN I (test 0.000 ng/mL  See_Comment                [Automated



             code = 0881116186)                                        message] 

The



                                                                 system which



                                                                 generated this



                                                                 result



                                                                 transmitted



                                                                 reference range

:



                                                                 <=0.034. The



                                                                 reference range



                                                                 was not used to



                                                                 interpret this



                                                                 result as



                                                                 normal/abnormal

.

 

             MAL (test code = Reference (Normal)                           



             MAL)         Range (defined by                           



                          the 99th percentile                           



                          reference limit): <=                           



                          0.034 ng/mL Note:                           



                          Cardiac troponin                           



                          begins to rise 3-4                           



                          hours after the                           



                          onset of ischemia.                           



                          Repeat in 4-6 hours                           



                          if the sample was                           



                          drawn within 3-4                           



                          hours of the onset                           



                          of the symptom and                           



                          found normal.                           



                          Diagnosis of                           



                          myocardial injury is                           



                          made with acute                           



                          changes in cTn                           



                          concentrations with                           



                          at least one serial                           



                          sample above the                           



                          99th percentile                           



                          upper reference                           



                          limit (URL), taken                           



                          together with the                           



                          patient's clinical                           



                          presentation. Biotin                           



                          has been reported to                           



                          cause a negative                           



                          bias, interpret                           



                          results relative to                           



                          patient's use of                           



                          biotin.                                

 

             Lab Interpretation Normal                                 



             (test code =                                        



             38162-1)                                            



[facility] Metabolic Panel (Na, K, Cl, CO2, 
Glucose, BUN, Creatinine, Ca)2022 18:27:49





             Test Item    Value        Reference Range Interpretation Comments

 

             NA (test code = 138 mmol/L   135-145                   



             [id number])                                         

 

             K (test code = 3.7 mmol/L   3.5-5                     



             [id number])                                         

 

             CL (test code = 115 mmol/L          H            



             [id number])                                         

 

             CO2 TOTAL (test code = 16 mmol/L    23-31        L            



             2132135772)                                         

 

             AGAP (test code =              2-16                      



             [id number])                                         

 

             BUN (test code = 7 mg/dL      7-23                      



             6800483604)                                         

 

             GLUCOSE (test code = 195 mg/dL           H            



             [id number])                                         

 

             CREATININE (test code = 0.52 mg/dL   0.6-1.25     L            



             [id number])                                         

 

             CALCIUM (test code = 8.2 mg/dL    8.6-10.6     L            



             0940763560)                                         

 

             eGFR (test code =              mL/min/1.73m2              



             6221022536)                                         

 

             MAL (test code = MAL) Association of                           



                          Glomerular Filtration                           



                          Rate (GFR) and Staging                           



                          of Kidney Disease*                           



                          +---------------------                           



                          --+-------------------                           



                          --+-------------------                           



                          ------+| GFR                           



                          (mL/min/1.73 m2) ?|                           



                          With Kidney Damage ?|                           



                          ?Without Kidney                           



                          Damage+---------------                           



                          --------+-------------                           



                          --------+-------------                           



                          ------------+| ?>90 ?                           



                          ? ? ? ? ? ? ? ?|                           



                          ?Stage one ? ? ? ? ?|                           



                          ? Normal ? ? ? ? ? ? ?                           



                          ?+--------------------                           



                          ---+------------------                           



                          ---+------------------                           



                          -------+| ?60-89 ? ? ?                           



                          ? ? ? ? ?| ?Stage two                           



                          ? ? ? ? ?| ? Decreased                           



                          GFR ? ? ? ?                            



                          +---------------------                           



                          --+-------------------                           



                          --+-------------------                           



                          ------+| ?30-59 ? ? ?                           



                          ? ? ? ? ?| ?Stage                           



                          three ? ? ? ?| ? Stage                           



                          three ? ? ? ? ?                           



                          +---------------------                           



                          --+-------------------                           



                          --+-------------------                           



                          ------+| ?15-29 ? ? ?                           



                          ? ? ? ? ?| ?Stage four                           



                          ? ? ? ? | ? Stage four                           



                          ? ? ? ? ?                              



                          ?+--------------------                           



                          ---+------------------                           



                          ---+------------------                           



                          -------+| ?<15 (or                           



                          dialysis) ? ?| ?Stage                           



                          five ? ? ? ? | ? Stage                           



                          five ? ? ? ? ?                           



                          ?+--------------------                           



                          ---+------------------                           



                          ---+------------------                           



                          -------+ *Each stage                           



                          assumes the associated                           



                          GFR level has been in                           



                          effect for at least                           



                          three months. ?Stages                           



                          1 to 5, with or                           



                          without kidney                           



                          disease, indicate                           



                          chronic kidney                           



                          disease. Notes:                           



                          Determination of                           



                          stages one and two                           



                          (with eGFR                             



                          >59mL/min/1.73 m2)                           



                          requires estimation of                           



                          kidney damage for at                           



                          least three months as                           



                          defined by structural                           



                          or functional                           



                          abnormalities of the                           



                          kidney, manifested by                           



                          either:Pathological                           



                          abnormalities or                           



                          Markers of kidney                           



                          damage (including                           



                          abnormalities in the                           



                          composition of the                           



                          blood or urine or                           



                          abnormalities in                           



                          imaging tests).                           

 

             Lab Interpretation Abnormal                               



             (test code = 70668-9)                                        



[facility] Metabolic Panel (Na, K, Cl, CO2, 
Glucose, BUN, Creatinine, Ca)2022 18:27:49





             Test Item    Value        Reference Range Interpretation Comments

 

             NA (test code = 138 mmol/L   135-145                   



             [id number])                                         

 

             K (test code = 3.7 mmol/L   3.5-5                     



             9197146470)                                         

 

             CL (test code = 115 mmol/L          H            



             4681551778)                                         

 

             CO2 TOTAL (test code = 16 mmol/L    23-31        L            



             9431380858)                                         

 

             AGAP (test code =              2-16                      



             0395323994)                                         

 

             BUN (test code = 7 mg/dL      7-23                      



             7101509313)                                         

 

             GLUCOSE (test code = 195 mg/dL           H            



             6312820953)                                         

 

             CREATININE (test code = 0.52 mg/dL   0.6-1.25     L            



             4207045656)                                         

 

             CALCIUM (test code = 8.2 mg/dL    8.6-10.6     L            



             3671943833)                                         

 

             eGFR (test code =              mL/min/1.73m2              



             1042321938)                                         

 

             MAL (test code = MAL) Association of                           



                          Glomerular Filtration                           



                          Rate (GFR) and Staging                           



                          of Kidney Disease*                           



                          +---------------------                           



                          --+-------------------                           



                          --+-------------------                           



                          ------+| GFR                           



                          (mL/min/1.73 m2) ?|                           



                          With Kidney Damage ?|                           



                          ?Without Kidney                           



                          Damage+---------------                           



                          --------+-------------                           



                          --------+-------------                           



                          ------------+| ?>90 ?                           



                          ? ? ? ? ? ? ? ?|                           



                          ?Stage one ? ? ? ? ?|                           



                          ? Normal ? ? ? ? ? ? ?                           



                          ?+--------------------                           



                          ---+------------------                           



                          ---+------------------                           



                          -------+| ?60-89 ? ? ?                           



                          ? ? ? ? ?| ?Stage two                           



                          ? ? ? ? ?| ? Decreased                           



                          GFR ? ? ? ?                            



                          +---------------------                           



                          --+-------------------                           



                          --+-------------------                           



                          ------+| ?30-59 ? ? ?                           



                          ? ? ? ? ?| ?Stage                           



                          three ? ? ? ?| ? Stage                           



                          three ? ? ? ? ?                           



                          +---------------------                           



                          --+-------------------                           



                          --+-------------------                           



                          ------+| ?15-29 ? ? ?                           



                          ? ? ? ? ?| ?Stage four                           



                          ? ? ? ? | ? Stage four                           



                          ? ? ? ? ?                              



                          ?+--------------------                           



                          ---+------------------                           



                          ---+------------------                           



                          -------+| ?<15 (or                           



                          dialysis) ? ?| ?Stage                           



                          five ? ? ? ? | ? Stage                           



                          five ? ? ? ? ?                           



                          ?+--------------------                           



                          ---+------------------                           



                          ---+------------------                           



                          -------+ *Each stage                           



                          assumes the associated                           



                          GFR level has been in                           



                          effect for at least                           



                          three months. ?Stages                           



                          1 to 5, with or                           



                          without kidney                           



                          disease, indicate                           



                          chronic kidney                           



                          disease. Notes:                           



                          Determination of                           



                          stages one and two                           



                          (with eGFR                             



                          >59mL/min/1.73 m2)                           



                          requires estimation of                           



                          kidney damage for at                           



                          least three months as                           



                          defined by structural                           



                          or functional                           



                          abnormalities of the                           



                          kidney, manifested by                           



                          either:Pathological                           



                          abnormalities or                           



                          Markers of kidney                           



                          damage (including                           



                          abnormalities in the                           



                          composition of the                           



                          blood or urine or                           



                          abnormalities in                           



                          imaging tests).                           

 

             Lab Interpretation Abnormal                               



             (test code = 80627-1)                                        



[facility]





             Test Item    Value        Reference Range Interpretation Comments

 

             BOH (test code = 3.3 mmol/L                             



             [id number])                                         

 

             MAL (test code = Normal Ranges: ? ?                           



             MAL)         Nonfasting ? ? ? ? ? Less                           



                          than 0.1 mmol/L ? ?                           



                          Overnight Fast ? ? ? Less                           



                          than 0.4 mmol/L ? ? Fasting                           



                          (1-2 weeks) ?6-8 mmol/L Test                          

 



                          developed and                           



                          characteristics determined                           



                          by [facility].                          

 



[facility]





             Test Item    Value        Reference Range Interpretation Comments

 

             BOH (test code = 3.3 mmol/L                             



             4400380522)                                         

 

             MLA (test code = Normal Ranges: ? ?                           



             MAL)         Nonfasting ? ? ? ? ? Less                           



                          than 0.1 mmol/L ? ?                           



                          Overnight Fast ? ? ? Less                           



                          than 0.4 mmol/L ? ? Fasting                           



                          (1-2 weeks) ?6-8 mmol/L Test                          

 



                          developed and                           



                          characteristics determined                           



                          by [facility].                          

 



[facility] GLUCOSE (AUTOMATED)2022 17:29:05





             Test Item    Value        Reference Range Interpretation Comments

 

             POCT GLU (test code = 0527844608) 192 mg/dL           H      

      

 

             Lab Interpretation (test code = Abnormal                           

    



             46271-1)                                            



[facility] GLUCOSE (AUTOMATED)2022 17:29:05





             Test Item    Value        Reference Range Interpretation Comments

 

             POCT GLU (test code = 7517392031) 192 mg/dL           H      

      

 

             Lab Interpretation (test code = Abnormal                           

    



             86967-6)                                            



[facility] [id number] 16:28:42





             Test Item    Value        Reference Range Interpretation Comments

 

             OSMOLALITY (test code =              See_Comment  H             [Au

tomated message]



             2692-2)                                             The system [employer]



                                                                 generated this 

result



                                                                 transmitted ref

erence



                                                                 range: 278 - 30

5



                                                                 mOsm/kg. The



                                                                 reference range

 was



                                                                 not used to int

erpret



                                                                 this result as



                                                                 normal/abnormal

.

 

             Lab Interpretation (test Abnormal                               



             code = 67562-1)                                        



[facility] [id number] 16:28:42





             Test Item    Value        Reference Range Interpretation Comments

 

             OSMOLALITY (test code =              See_Comment  H             [Au

tomated message]



             2692-2)                                             The system [employer]



                                                                 generated this 

result



                                                                 transmitted ref

erence



                                                                 range: 278 - 30

5



                                                                 mOsm/kg. The



                                                                 reference range

 was



                                                                 not used to int

erpret



                                                                 this result as



                                                                 normal/abnormal

.

 

             Lab Interpretation (test Abnormal                               



             code = 92216-7)                                        



[facility] Chimk3162-51-05 16:28:42





             Test Item    Value        Reference Range Interpretation Comments

 

             OSMOLALITY (test code =              See_Comment  H             [Au

tomated message]



             2692-2)                                             The system [employer]



                                                                 generated this 

result



                                                                 transmitted ref

erence



                                                                 range: 278 - 30

5



                                                                 mOsm/kg. The



                                                                 reference range

 was



                                                                 not used to int

erpret



                                                                 this result as



                                                                 normal/abnormal

.

 

             Lab Interpretation (test Abnormal                               



             code = 36664-7)                                        



[facility] Metabolic Panel (Na, K, Cl, CO2, 
Glucose, BUN, Creatinine, Ca)2022 16:02:27





             Test Item    Value        Reference Range Interpretation Comments

 

             NA (test code = 138 mmol/L   135-145                   



             [id number])                                         

 

             K (test code = 3.7 mmol/L   3.5-5                     



             2997402718)                                         

 

             CL (test code = 117 mmol/L          H            



             1607004312)                                         

 

             CO2 TOTAL (test code = 15 mmol/L    23-31        L            



             5270776223)                                         

 

             AGAP (test code =              2-16                      



             0812129224)                                         

 

             BUN (test code = 7 mg/dL      7-23                      



             5587150770)                                         

 

             GLUCOSE (test code = 204 mg/dL           H            



             2839749509)                                         

 

             CREATININE (test code = 0.41 mg/dL   0.6-1.25     L            



             0118508188)                                         

 

             CALCIUM (test code = 8.3 mg/dL    8.6-10.6     L            



             1777298359)                                         

 

             eGFR (test code =              mL/min/1.73m2              



             7359162905)                                         

 

             MAL (test code = MAL) Association of                           



                          Glomerular Filtration                           



                          Rate (GFR) and Staging                           



                          of Kidney Disease*                           



                          +---------------------                           



                          --+-------------------                           



                          --+-------------------                           



                          ------+| GFR                           



                          (mL/min/1.73 m2) ?|                           



                          With Kidney Damage ?|                           



                          ?Without Kidney                           



                          Damage+---------------                           



                          --------+-------------                           



                          --------+-------------                           



                          ------------+| ?>90 ?                           



                          ? ? ? ? ? ? ? ?|                           



                          ?Stage one ? ? ? ? ?|                           



                          ? Normal ? ? ? ? ? ? ?                           



                          ?+--------------------                           



                          ---+------------------                           



                          ---+------------------                           



                          -------+| ?60-89 ? ? ?                           



                          ? ? ? ? ?| ?Stage two                           



                          ? ? ? ? ?| ? Decreased                           



                          GFR ? ? ? ?                            



                          +---------------------                           



                          --+-------------------                           



                          --+-------------------                           



                          ------+| ?30-59 ? ? ?                           



                          ? ? ? ? ?| ?Stage                           



                          three ? ? ? ?| ? Stage                           



                          three ? ? ? ? ?                           



                          +---------------------                           



                          --+-------------------                           



                          --+-------------------                           



                          ------+| ?15-29 ? ? ?                           



                          ? ? ? ? ?| ?Stage four                           



                          ? ? ? ? | ? Stage four                           



                          ? ? ? ? ?                              



                          ?+--------------------                           



                          ---+------------------                           



                          ---+------------------                           



                          -------+| ?<15 (or                           



                          dialysis) ? ?| ?Stage                           



                          five ? ? ? ? | ? Stage                           



                          five ? ? ? ? ?                           



                          ?+--------------------                           



                          ---+------------------                           



                          ---+------------------                           



                          -------+ *Each stage                           



                          assumes the associated                           



                          GFR level has been in                           



                          effect for at least                           



                          three months. ?Stages                           



                          1 to 5, with or                           



                          without kidney                           



                          disease, indicate                           



                          chronic kidney                           



                          disease. Notes:                           



                          Determination of                           



                          stages one and two                           



                          (with eGFR                             



                          >59mL/min/1.73 m2)                           



                          requires estimation of                           



                          kidney damage for at                           



                          least three months as                           



                          defined by structural                           



                          or functional                           



                          abnormalities of the                           



                          kidney, manifested by                           



                          either:Pathological                           



                          abnormalities or                           



                          Markers of kidney                           



                          damage (including                           



                          abnormalities in the                           



                          composition of the                           



                          blood or urine or                           



                          abnormalities in                           



                          imaging tests).                           

 

             Lab Interpretation Abnormal                               



             (test code = 55702-2)                                        



[facility] Metabolic Panel (Na, K, Cl, CO2, 
Glucose, BUN, Creatinine, Ca)2022 16:02:27





             Test Item    Value        Reference Range Interpretation Comments

 

             NA (test code = 138 mmol/L   135-145                   



             5567659177)                                         

 

             K (test code = 3.7 mmol/L   3.5-5                     



             8128434880)                                         

 

             CL (test code = 117 mmol/L          H            



             9850911235)                                         

 

             CO2 TOTAL (test code = 15 mmol/L    23-31        L            



             6562738632)                                         

 

             AGAP (test code =              2-16                      



             4227570698)                                         

 

             BUN (test code = 7 mg/dL      7-23                      



             9374734886)                                         

 

             GLUCOSE (test code = 204 mg/dL           H            



             0998739696)                                         

 

             CREATININE (test code = 0.41 mg/dL   0.6-1.25     L            



             4699789190)                                         

 

             CALCIUM (test code = 8.3 mg/dL    8.6-10.6     L            



             8518190801)                                         

 

             eGFR (test code =              mL/min/1.73m2              



             8212404205)                                         

 

             MAL (test code = MAL) Association of                           



                          Glomerular Filtration                           



                          Rate (GFR) and Staging                           



                          of Kidney Disease*                           



                          +---------------------                           



                          --+-------------------                           



                          --+-------------------                           



                          ------+| GFR                           



                          (mL/min/1.73 m2) ?|                           



                          With Kidney Damage ?|                           



                          ?Without Kidney                           



                          Damage+---------------                           



                          --------+-------------                           



                          --------+-------------                           



                          ------------+| ?>90 ?                           



                          ? ? ? ? ? ? ? ?|                           



                          ?Stage one ? ? ? ? ?|                           



                          ? Normal ? ? ? ? ? ? ?                           



                          ?+--------------------                           



                          ---+------------------                           



                          ---+------------------                           



                          -------+| ?60-89 ? ? ?                           



                          ? ? ? ? ?| ?Stage two                           



                          ? ? ? ? ?| ? Decreased                           



                          GFR ? ? ? ?                            



                          +---------------------                           



                          --+-------------------                           



                          --+-------------------                           



                          ------+| ?30-59 ? ? ?                           



                          ? ? ? ? ?| ?Stage                           



                          three ? ? ? ?| ? Stage                           



                          three ? ? ? ? ?                           



                          +---------------------                           



                          --+-------------------                           



                          --+-------------------                           



                          ------+| ?15-29 ? ? ?                           



                          ? ? ? ? ?| ?Stage four                           



                          ? ? ? ? | ? Stage four                           



                          ? ? ? ? ?                              



                          ?+--------------------                           



                          ---+------------------                           



                          ---+------------------                           



                          -------+| ?<15 (or                           



                          dialysis) ? ?| ?Stage                           



                          five ? ? ? ? | ? Stage                           



                          five ? ? ? ? ?                           



                          ?+--------------------                           



                          ---+------------------                           



                          ---+------------------                           



                          -------+ *Each stage                           



                          assumes the associated                           



                          GFR level has been in                           



                          effect for at least                           



                          three months. ?Stages                           



                          1 to 5, with or                           



                          without kidney                           



                          disease, indicate                           



                          chronic kidney                           



                          disease. Notes:                           



                          Determination of                           



                          stages one and two                           



                          (with eGFR                             



                          >59mL/min/1.73 m2)                           



                          requires estimation of                           



                          kidney damage for at                           



                          least three months as                           



                          defined by structural                           



                          or functional                           



                          abnormalities of the                           



                          kidney, manifested by                           



                          either:Pathological                           



                          abnormalities or                           



                          Markers of kidney                           



                          damage (including                           



                          abnormalities in the                           



                          composition of the                           



                          blood or urine or                           



                          abnormalities in                           



                          imaging tests).                           

 

             Lab Interpretation Abnormal                               



             (test code = 01314-3)                                        



[facility] GLUCOSE (AUTOMATED)2022 15:54:35





             Test Item    Value        Reference Range Interpretation Comments

 

             POCT GLU (test code = 7209437423) 200 mg/dL           H      

      

 

             Lab Interpretation (test code = Abnormal                           

    



             89787-5)                                            



[facility] GLUCOSE (AUTOMATED)2022 15:54:35





             Test Item    Value        Reference Range Interpretation Comments

 

             POCT GLU (test code = 9236894964) 200 mg/dL           H      

      

 

             Lab Interpretation (test code = Abnormal                           

    



             10275-9)                                            



[facility] GLUCOSE (AUTOMATED)2022 14:57:17





             Test Item    Value        Reference Range Interpretation Comments

 

             POCT GLU (test code = 5047501125) 211 mg/dL           H      

      

 

             Lab Interpretation (test code = Abnormal                           

    



             48938-5)                                            



[facility] GLUCOSE (AUTOMATED)2022 14:57:17





             Test Item    Value        Reference Range Interpretation Comments

 

             POCT GLU (test code = 0245114046) 211 mg/dL           H      

      

 

             Lab Interpretation (test code = Abnormal                           

    



             09108-0)                                            



[facility] GLUCOSE (AUTOMATED)2022 13:50:06





             Test Item    Value        Reference Range Interpretation Comments

 

             POCT GLU (test code = 6296032311) 216 mg/dL           H      

      

 

             Lab Interpretation (test code = Abnormal                           

    



             48560-0)                                            



[facility] GLUCOSE (AUTOMATED)2022 13:50:06





             Test Item    Value        Reference Range Interpretation Comments

 

             POCT GLU (test code = 8664142075) 216 mg/dL           H      

      

 

             Lab Interpretation (test code = Abnormal                           

    



             14820-8)                                            



[facility] GLUCOSE (AUTOMATED)2022 10:43:13





             Test Item    Value        Reference Range Interpretation Comments

 

             POCT GLU (test code = 7058115752) 199 mg/dL           H      

      

 

             Lab Interpretation (test code = Abnormal                           

    



             72314-3)                                            



[facility] GLUCOSE (AUTOMATED)2022 10:43:13





             Test Item    Value        Reference Range Interpretation Comments

 

             POCT GLU (test code = 0032878669) 199 mg/dL           H      

      

 

             Lab Interpretation (test code = Abnormal                           

    



             38383-4)                                            



[facility] GLUCOSE (AUTOMATED)2022 09:31:59





             Test Item    Value        Reference Range Interpretation Comments

 

             POCT GLU (test code = 9740595853) 172 mg/dL           H      

      

 

             Lab Interpretation (test code = Abnormal                           

    



             02638-1)                                            



[facility] GLUCOSE (AUTOMATED)2022 09:31:59





             Test Item    Value        Reference Range Interpretation Comments

 

             POCT GLU (test code = 0849054231) 172 mg/dL           H      

      

 

             Lab Interpretation (test code = Abnormal                           

    



             38169-2)                                            



[facility] Metabolic Panel (Na, K, Cl, CO2, 
Glucose, BUN, Creatinine, Ca)2022 08:02:13





             Test Item    Value        Reference Range Interpretation Comments

 

             NA (test code = 140 mmol/L   135-145                   



             2313049267)                                         

 

             K (test code = 3.8 mmol/L   3.5-5                     



             2934847483)                                         

 

             CL (test code = 110 mmol/L          H            



             6066180409)                                         

 

             CO2 TOTAL (test code = 13 mmol/L    23-31        L            



             5656159106)                                         

 

             AGAP (test code =              2-16         H            



             1880361540)                                         

 

             BUN (test code = 10 mg/dL     7-23                      



             9768034317)                                         

 

             GLUCOSE (test code = 223 mg/dL           H            



             1302575429)                                         

 

             CREATININE (test code = 0.58 mg/dL   0.6-1.25     L            



             4304265234)                                         

 

             CALCIUM (test code = 8.8 mg/dL    8.6-10.6                  



             0066148028)                                         

 

             eGFR (test code =              mL/min/1.73m2              



             0177896757)                                         

 

             MAL (test code = MAL) Association of                           



                          Glomerular Filtration                           



                          Rate (GFR) and Staging                           



                          of Kidney Disease*                           



                          +---------------------                           



                          --+-------------------                           



                          --+-------------------                           



                          ------+| GFR                           



                          (mL/min/1.73 m2) ?|                           



                          With Kidney Damage ?|                           



                          ?Without Kidney                           



                          Damage+---------------                           



                          --------+-------------                           



                          --------+-------------                           



                          ------------+| ?>90 ?                           



                          ? ? ? ? ? ? ? ?|                           



                          ?Stage one ? ? ? ? ?|                           



                          ? Normal ? ? ? ? ? ? ?                           



                          ?+--------------------                           



                          ---+------------------                           



                          ---+------------------                           



                          -------+| ?60-89 ? ? ?                           



                          ? ? ? ? ?| ?Stage two                           



                          ? ? ? ? ?| ? Decreased                           



                          GFR ? ? ? ?                            



                          +---------------------                           



                          --+-------------------                           



                          --+-------------------                           



                          ------+| ?30-59 ? ? ?                           



                          ? ? ? ? ?| ?Stage                           



                          three ? ? ? ?| ? Stage                           



                          three ? ? ? ? ?                           



                          +---------------------                           



                          --+-------------------                           



                          --+-------------------                           



                          ------+| ?15-29 ? ? ?                           



                          ? ? ? ? ?| ?Stage four                           



                          ? ? ? ? | ? Stage four                           



                          ? ? ? ? ?                              



                          ?+--------------------                           



                          ---+------------------                           



                          ---+------------------                           



                          -------+| ?<15 (or                           



                          dialysis) ? ?| ?Stage                           



                          five ? ? ? ? | ? Stage                           



                          five ? ? ? ? ?                           



                          ?+--------------------                           



                          ---+------------------                           



                          ---+------------------                           



                          -------+ *Each stage                           



                          assumes the associated                           



                          GFR level has been in                           



                          effect for at least                           



                          three months. ?Stages                           



                          1 to 5, with or                           



                          without kidney                           



                          disease, indicate                           



                          chronic kidney                           



                          disease. Notes:                           



                          Determination of                           



                          stages one and two                           



                          (with eGFR                             



                          >59mL/min/1.73 m2)                           



                          requires estimation of                           



                          kidney damage for at                           



                          least three months as                           



                          defined by structural                           



                          or functional                           



                          abnormalities of the                           



                          kidney, manifested by                           



                          either:Pathological                           



                          abnormalities or                           



                          Markers of kidney                           



                          damage (including                           



                          abnormalities in the                           



                          composition of the                           



                          blood or urine or                           



                          abnormalities in                           



                          imaging tests).                           

 

             Lab Interpretation Abnormal                               



             (test code = 89678-9)                                        



[facility] Metabolic Panel (Na, K, Cl, CO2, 
Glucose, BUN, Creatinine, Ca)2022 08:02:13





             Test Item    Value        Reference Range Interpretation Comments

 

             NA (test code = 140 mmol/L   135-145                   



             3477790256)                                         

 

             K (test code = 3.8 mmol/L   3.5-5                     



             0637378100)                                         

 

             CL (test code = 110 mmol/L          H            



             0242455797)                                         

 

             CO2 TOTAL (test code = 13 mmol/L    23-31        L            



             6438220176)                                         

 

             AGAP (test code =              2-16         H            



             6832412821)                                         

 

             BUN (test code = 10 mg/dL     7-23                      



             7778712562)                                         

 

             GLUCOSE (test code = 223 mg/dL           H            



             0567235382)                                         

 

             CREATININE (test code = 0.58 mg/dL   0.6-1.25     L            



             4416557810)                                         

 

             CALCIUM (test code = 8.8 mg/dL    8.6-10.6                  



             5150071104)                                         

 

             eGFR (test code =              mL/min/1.73m2              



             8964186124)                                         

 

             MAL (test code = MAL) Association of                           



                          Glomerular Filtration                           



                          Rate (GFR) and Staging                           



                          of Kidney Disease*                           



                          +---------------------                           



                          --+-------------------                           



                          --+-------------------                           



                          ------+| GFR                           



                          (mL/min/1.73 m2) ?|                           



                          With Kidney Damage ?|                           



                          ?Without Kidney                           



                          Damage+---------------                           



                          --------+-------------                           



                          --------+-------------                           



                          ------------+| ?>90 ?                           



                          ? ? ? ? ? ? ? ?|                           



                          ?Stage one ? ? ? ? ?|                           



                          ? Normal ? ? ? ? ? ? ?                           



                          ?+--------------------                           



                          ---+------------------                           



                          ---+------------------                           



                          -------+| ?60-89 ? ? ?                           



                          ? ? ? ? ?| ?Stage two                           



                          ? ? ? ? ?| ? Decreased                           



                          GFR ? ? ? ?                            



                          +---------------------                           



                          --+-------------------                           



                          --+-------------------                           



                          ------+| ?30-59 ? ? ?                           



                          ? ? ? ? ?| ?Stage                           



                          three ? ? ? ?| ? Stage                           



                          three ? ? ? ? ?                           



                          +---------------------                           



                          --+-------------------                           



                          --+-------------------                           



                          ------+| ?15-29 ? ? ?                           



                          ? ? ? ? ?| ?Stage four                           



                          ? ? ? ? | ? Stage four                           



                          ? ? ? ? ?                              



                          ?+--------------------                           



                          ---+------------------                           



                          ---+------------------                           



                          -------+| ?<15 (or                           



                          dialysis) ? ?| ?Stage                           



                          five ? ? ? ? | ? Stage                           



                          five ? ? ? ? ?                           



                          ?+--------------------                           



                          ---+------------------                           



                          ---+------------------                           



                          -------+ *Each stage                           



                          assumes the associated                           



                          GFR level has been in                           



                          effect for at least                           



                          three months. ?Stages                           



                          1 to 5, with or                           



                          without kidney                           



                          disease, indicate                           



                          chronic kidney                           



                          disease. Notes:                           



                          Determination of                           



                          stages one and two                           



                          (with eGFR                             



                          >59mL/min/1.73 m2)                           



                          requires estimation of                           



                          kidney damage for at                           



                          least three months as                           



                          defined by structural                           



                          or functional                           



                          abnormalities of the                           



                          kidney, manifested by                           



                          either:Pathological                           



                          abnormalities or                           



                          Markers of kidney                           



                          damage (including                           



                          abnormalities in the                           



                          composition of the                           



                          blood or urine or                           



                          abnormalities in                           



                          imaging tests).                           

 

             Lab Interpretation Abnormal                               



             (test code = 14970-6)                                        



[facility] GLUCOSE (AUTOMATED)2022 07:26:01





             Test Item    Value        Reference Range Interpretation Comments

 

             POCT GLU (test code = 4727002005) 245 mg/dL           H      

      

 

             Lab Interpretation (test code = Abnormal                           

    



             56517-3)                                            



[facility] GLUCOSE (AUTOMATED)2022 07:26:01





             Test Item    Value        Reference Range Interpretation Comments

 

             POCT GLU (test code = 4036716120) 245 mg/dL           H      

      

 

             Lab Interpretation (test code = Abnormal                           

    



             61779-7)                                            



[facility] GLUCOSE(AGE &gt;30DAYS)2022 
07:11:00





             Test Item    Value        Reference Range Interpretation Comments

 

             POCT Glu (age>30days) (test code = 245 mg/dL                 

       



             3342)                                               

 

             Lab Interpretation (test code = Normal                             

    



             43273-8)                                            



[facility] GLUCOSE(AGE &gt;30DAYS)2022 
07:11:00





             Test Item    Value        Reference Range Interpretation Comments

 

             POCT Glu (age>30days) (test code = 245 mg/dL                 

       



             3342)                                               

 

             Lab Interpretation (test code = Normal                             

    



             54661-6)                                            



[facility]





             Test Item    Value        Reference Range Interpretation Comments

 

             POCT Glu (age>30days) (test code = 245 mg/dL                 

       



             3342)                                               

 

             Lab Interpretation (test code = Normal                             

    



             10538-5)                                            



[facility] 
[facility]57:25





             Test Item    Value        Reference Range Interpretation Comments

 

             HGB A1C (test code = 12.7 %       4-5.7        H            



             4548-4)                                             

 

             MAL (test code = MAL) Reference                              



                          RangesNormal:                           



                          <5.7%Prediabetes:                           



                          5.7 - 6.4%Diabetes:                           



                          > 6.5%                                 

 

             Lab Interpretation (test Abnormal                               



             code = 85671-7)                                        



[facility] 
[facility]57:25





             Test Item    Value        Reference Range Interpretation Comments

 

             HGB A1C (test code = 12.7 %       4-5.7        H            



             4548-4)                                             

 

             MAL (test code = MAL) Reference                              



                          RangesNormal:                           



                          <5.7%Prediabetes:                           



                          5.7 - 6.4%Diabetes:                           



                          > 6.5%                                 

 

             Lab Interpretation (test Abnormal                               



             code = 25693-4)                                        



[facility] 
[facility]:57:25





             Test Item    Value        Reference Range Interpretation Comments

 

             HGB A1C (test code = 12.7 %       4-5.7        H            



             4548-4)                                             

 

             MAL (test code = MAL) Reference                              



                          RangesNormal:                           



                          <5.7%Prediabetes:                           



                          5.7 - 6.4%Diabetes:                           



                          > 6.5%                                 

 

             Lab Interpretation (test Abnormal                               



             code = 39265-1)                                        



[id number]44





             Test Item    Value        Reference Range Interpretation Comments

 

             MAGNESIUM (test code = 4614907674) 1.7 mg/dL    1.7-2.4            

       

 

             Lab Interpretation (test code = Normal                             

    



             17642-2)                                            



[facility] [id number] 05:46:44





             Test Item    Value        Reference Range Interpretation Comments

 

             MAGNESIUM (test code = 9094010260) 1.7 mg/dL    1.7-2.4            

       

 

             Lab Interpretation (test code = Normal                             

    



             45729-3)                                            



[id number]





             Test Item    Value        Reference Range Interpretation Comments

 

             PHOSPHORUS (test code = 5847136429) 4.0 mg/dL    2.5-5             

        

 

             Lab Interpretation (test code = Normal                             

    



             09125-8)                                            



[facility] [id number] 05:46:24





             Test Item    Value        Reference Range Interpretation Comments

 

             PHOSPHORUS (test code = 3661388927) 4.0 mg/dL    2.5-5             

        

 

             Lab Interpretation (test code = Normal                             

    



             81196-1)                                            



[facility] [id number] 05:46:24





             Test Item    Value        Reference Range Interpretation Comments

 

             PHOSPHORUS (test code = 3032646659) 4.0 mg/dL    2.5-5             

        

 

             Lab Interpretation (test code = Normal                             

    



             27634-0)                                            



[facility] GLUCOSE (AUTOMATED)2022 05:11:10





             Test Item    Value        Reference Range Interpretation Comments

 

             POCT GLU (test code = 4410213303) 410 mg/dL           H      

      

 

             Lab Interpretation (test code = Abnormal                           

    



             91623-3)                                            



[facility] GLUCOSE (AUTOMATED)2022 05:11:10





             Test Item    Value        Reference Range Interpretation Comments

 

             POCT GLU (test code = 9381404611) 410 mg/dL           H      

      

 

             Lab Interpretation (test code = Abnormal                           

    



             90474-5)                                            



[facility] GLUCOSE(AGE &gt;30DAYS)2022 
05:11:00





             Test Item    Value        Reference Range Interpretation Comments

 

             POCT Glu (age>30days) (test code = 410 mg/dL           A     

       



             3342)                                               

 

             Lab Interpretation (test code = Abnormal                           

    



             23457-1)                                            



[facility] GLUCOSE(AGE &gt;30DAYS)2022 
05:11:00





             Test Item    Value        Reference Range Interpretation Comments

 

             POCT Glu (age>30days) (test code = 410 mg/dL           A     

       



             3342)                                               

 

             Lab Interpretation (test code = Abnormal                           

    



             50754-4)                                            



[facility] WITH [id number] 05:03:57





             Test Item    Value        Reference Range Interpretation Comments

 

             WBC (test code =              See_Comment                [Automated



             6690-2)                                             message] The sy

stem



                                                                 which generated



                                                                 this result



                                                                 transmitted



                                                                 reference range

:



                                                                 4.20 - 10.70



                                                                 10*3/?L. The



                                                                 reference range

 was



                                                                 not used to



                                                                 interpret this



                                                                 result as



                                                                 normal/abnormal

.

 

             RBC (test code =              See_Comment                [Automated



             789-8)                                              message] The sy

stem



                                                                 which generated



                                                                 this result



                                                                 transmitted



                                                                 reference range

:



                                                                 4.26 - 5.52



                                                                 10*6/?L. The



                                                                 reference range

 was



                                                                 not used to



                                                                 interpret this



                                                                 result as



                                                                 normal/abnormal

.

 

             HGB (test code = 13.9 g/dL    12.2-16.4                 



             718-7)                                              

 

             HCT (test code = 42.6 %       38.4-49.3                 



             4544-3)                                             

 

             MCV (test code = 88.2 fL      81.7-95.6                 



             787-2)                                              

 

             MCH (test code = 28.8 pg      26.1-32.7                 



             785-6)                                              

 

             MCHC (test code = 32.6 g/dL    31.2-35                   



             786-4)                                              

 

             RDW-SD (test code = 44.1 fL      38.5-51.6                 



             88322-3)                                            

 

             RDW-CV (test code = 13.8 %       12.1-15.4                 



             788-0)                                              

 

             PLT (test code =              See_Comment  H             [Automated



             777-3)                                              message] The sy

stem



                                                                 which generated



                                                                 this result



                                                                 transmitted



                                                                 reference range

:



                                                                 150 - 328 10*3/

?L.



                                                                 The reference r

zhen



                                                                 was not used to



                                                                 interpret this



                                                                 result as



                                                                 normal/abnormal

.

 

             MPV (test code = 12.2 fL      9.8-13                    



             09554-0)                                            

 

             IPF % (test code = 9.4 %        1.2-10.7                  Platelet 

count



             [id number])                                         measured by



                                                                 fluorescence



                                                                 method.

 

             NRBC/100 WBC (test              See_Comment                [Automat

ed



             code = 2437642115)                                        message] 

The system



                                                                 which generated



                                                                 this result



                                                                 transmitted



                                                                 reference range

:



                                                                 0.0 - 10.0 /100



                                                                 WBCs. The refer

ence



                                                                 range was not u

sed



                                                                 to interpret th

is



                                                                 result as



                                                                 normal/abnormal

.

 

             NRBC x10^3 (test code              See_Comment                [Auto

mated



             = [id number])                                        message] The s

ystem



                                                                 which generated



                                                                 this result



                                                                 transmitted



                                                                 reference range

:



                                                                 10*3/?L. The



                                                                 reference range

 was



                                                                 not used to



                                                                 interpret this



                                                                 result as



                                                                 normal/abnormal

.

 

             GRAN MAT (NEUT) % 65.1 %                                 



             (test code = 770-8)                                        

 

             IMM GRAN % (test code 1.10 %                                 



             = 3052422521)                                        

 

             LYMPH % (test code = 22.4 %                                 



             736-9)                                              

 

             MONO % (test code = 9.8 %                                  



             5905-5)                                             

 

             EOS % (test code = 1.4 %                                  



             713-8)                                              

 

             BASO % (test code = 0.2 %                                  



             706-2)                                              

 

             GRAN MAT x10^3(ANC) 6.15 10*3/uL 1.99-6.95                 



             (test code =                                        



             [id number])                                         

 

             IMM GRAN x10^3 (test 0.10 10*3/uL 0-0.06       H            



             code = [id number])                                        

 

             LYMPH x10^3 (test code 2.11 10*3/uL 1.09-3.23                 



             = 731-0)                                            

 

             MONO x10^3 (test code 0.92 10*3/uL 0.36-1.02                 



             = 742-7)                                            

 

             EOS x10^3 (test code = 0.13 10*3/uL 0.06-0.53                 



             711-2)                                              

 

             BASO x10^3 (test code              0.01-0.09                 



             = 704-7)                                            

 

             Lab Interpretation Abnormal                               



             (test code = 88128-0)                                        



[facility] WITH [id number] 05:03:57





             Test Item    Value        Reference Range Interpretation Comments

 

             WBC (test code =              See_Comment                [Automated



             6490-2)                                             message] The sy

stem



                                                                 which generated



                                                                 this result



                                                                 transmitted



                                                                 reference range

:



                                                                 4.20 - 10.70



                                                                 10*3/?L. The



                                                                 reference range

 was



                                                                 not used to



                                                                 interpret this



                                                                 result as



                                                                 normal/abnormal

.

 

             RBC (test code =              See_Comment                [Automated



             629-8)                                              message] The sy

stem



                                                                 which generated



                                                                 this result



                                                                 transmitted



                                                                 reference range

:



                                                                 4.26 - 5.52



                                                                 10*6/?L. The



                                                                 reference range

 was



                                                                 not used to



                                                                 interpret this



                                                                 result as



                                                                 normal/abnormal

.

 

             HGB (test code = 13.9 g/dL    12.2-16.4                 



             718-7)                                              

 

             HCT (test code = 42.6 %       38.4-49.3                 



             4544-3)                                             

 

             MCV (test code = 88.2 fL      81.7-95.6                 



             787-2)                                              

 

             MCH (test code = 28.8 pg      26.1-32.7                 



             785-6)                                              

 

             MCHC (test code = 32.6 g/dL    31.2-35                   



             786-4)                                              

 

             RDW-SD (test code = 44.1 fL      38.5-51.6                 



             56742-9)                                            

 

             RDW-CV (test code = 13.8 %       12.1-15.4                 



             788-0)                                              

 

             PLT (test code =              See_Comment  H             [Automated



             777-3)                                              message] The sy

stem



                                                                 which generated



                                                                 this result



                                                                 transmitted



                                                                 reference range

:



                                                                 150 - 328 10*3/

?L.



                                                                 The reference r

zhen



                                                                 was not used to



                                                                 interpret this



                                                                 result as



                                                                 normal/abnormal

.

 

             MPV (test code = 12.2 fL      9.8-13                    



             38058-1)                                            

 

             IPF % (test code = 9.4 %        1.2-10.7                  Platelet 

count



             0536692539)                                         measured by



                                                                 fluorescence



                                                                 method.

 

             NRBC/100 WBC (test              See_Comment                [Automat

ed



             code = 7390410820)                                        message] 

The system



                                                                 which generated



                                                                 this result



                                                                 transmitted



                                                                 reference range

:



                                                                 0.0 - 10.0 /100



                                                                 WBCs. The refer

ence



                                                                 range was not u

sed



                                                                 to interpret th

is



                                                                 result as



                                                                 normal/abnormal

.

 

             NRBC x10^3 (test code              See_Comment                [Auto

mated



             = [id number])                                        message] The s

ystem



                                                                 which generated



                                                                 this result



                                                                 transmitted



                                                                 reference range

:



                                                                 10*3/?L. The



                                                                 reference range

 was



                                                                 not used to



                                                                 interpret this



                                                                 result as



                                                                 normal/abnormal

.

 

             GRAN MAT (NEUT) % 65.1 %                                 



             (test code = 770-8)                                        

 

             IMM GRAN % (test code 1.10 %                                 



             = 0514090699)                                        

 

             LYMPH % (test code = 22.4 %                                 



             736-9)                                              

 

             MONO % (test code = 9.8 %                                  



             5905-5)                                             

 

             EOS % (test code = 1.4 %                                  



             713-8)                                              

 

             BASO % (test code = 0.2 %                                  



             706-2)                                              

 

             GRAN MAT x10^3(ANC) 6.15 10*3/uL 1.99-6.95                 



             (test code =                                        



             8336015841)                                         

 

             IMM GRAN x10^3 (test 0.10 10*3/uL 0-0.06       H            



             code = 5361007847)                                        

 

             LYMPH x10^3 (test code 2.11 10*3/uL 1.09-3.23                 



             = 731-0)                                            

 

             MONO x10^3 (test code 0.92 10*3/uL 0.36-1.02                 



             = 742-7)                                            

 

             EOS x10^3 (test code = 0.13 10*3/uL 0.06-0.53                 



             711-2)                                              

 

             BASO x10^3 (test code              0.01-0.09                 



             = 704-7)                                            

 

             Lab Interpretation Abnormal                               



             (test code = 13174-2)                                        



[facility] METABOLIC PANEL (NA, K, CL, CO2, 
GLUCOSE, BUN, CREATININE, CA)2022 04:46:12





             Test Item    Value        Reference Range Interpretation Comments

 

             NA (test code = 136 mmol/L   135-145                   



             9338327859)                                         

 

             K (test code = 5.1 mmol/L   3.5-5        H            



             8467287460)                                         

 

             CL (test code = 106 mmol/L                       



             3226629566)                                         

 

             CO2 TOTAL (test code = 9 mmol/L     23-31        L            



             3364708511)                                         

 

             AGAP (test code =              2-16         H            



             7458983016)                                         

 

             BUN (test code = 11 mg/dL     7-23                      



             5730614935)                                         

 

             GLUCOSE (test code = 405 mg/dL           H            



             8034684181)                                         

 

             CREATININE (test code = 0.62 mg/dL   0.6-1.25                  



             7232121529)                                         

 

             CALCIUM (test code = 9.5 mg/dL    8.6-10.6                  



             9240187190)                                         

 

             eGFR (test code =              mL/min/1.73m2              



             5899088121)                                         

 

             MAL (test code = MAL) Association of                           



                          Glomerular Filtration                           



                          Rate (GFR) and Staging                           



                          of Kidney Disease*                           



                          +---------------------                           



                          --+-------------------                           



                          --+-------------------                           



                          ------+| GFR                           



                          (mL/min/1.73 m2) ?|                           



                          With Kidney Damage ?|                           



                          ?Without Kidney                           



                          Damage+---------------                           



                          --------+-------------                           



                          --------+-------------                           



                          ------------+| ?>90 ?                           



                          ? ? ? ? ? ? ? ?|                           



                          ?Stage one ? ? ? ? ?|                           



                          ? Normal ? ? ? ? ? ? ?                           



                          ?+--------------------                           



                          ---+------------------                           



                          ---+------------------                           



                          -------+| ?60-89 ? ? ?                           



                          ? ? ? ? ?| ?Stage two                           



                          ? ? ? ? ?| ? Decreased                           



                          GFR ? ? ? ?                            



                          +---------------------                           



                          --+-------------------                           



                          --+-------------------                           



                          ------+| ?30-59 ? ? ?                           



                          ? ? ? ? ?| ?Stage                           



                          three ? ? ? ?| ? Stage                           



                          three ? ? ? ? ?                           



                          +---------------------                           



                          --+-------------------                           



                          --+-------------------                           



                          ------+| ?15-29 ? ? ?                           



                          ? ? ? ? ?| ?Stage four                           



                          ? ? ? ? | ? Stage four                           



                          ? ? ? ? ?                              



                          ?+--------------------                           



                          ---+------------------                           



                          ---+------------------                           



                          -------+| ?<15 (or                           



                          dialysis) ? ?| ?Stage                           



                          five ? ? ? ? | ? Stage                           



                          five ? ? ? ? ?                           



                          ?+--------------------                           



                          ---+------------------                           



                          ---+------------------                           



                          -------+ *Each stage                           



                          assumes the associated                           



                          GFR level has been in                           



                          effect for at least                           



                          three months. ?Stages                           



                          1 to 5, with or                           



                          without kidney                           



                          disease, indicate                           



                          chronic kidney                           



                          disease. Notes:                           



                          Determination of                           



                          stages one and two                           



                          (with eGFR                             



                          >59mL/min/1.73 m2)                           



                          requires estimation of                           



                          kidney damage for at                           



                          least three months as                           



                          defined by structural                           



                          or functional                           



                          abnormalities of the                           



                          kidney, manifested by                           



                          either:Pathological                           



                          abnormalities or                           



                          Markers of kidney                           



                          damage (including                           



                          abnormalities in the                           



                          composition of the                           



                          blood or urine or                           



                          abnormalities in                           



                          imaging tests).                           

 

             Lab Interpretation Abnormal                               



             (test code = 55223-4)                                        



[facility] METABOLIC PANEL (NA, K, CL, CO2, 
GLUCOSE, BUN, CREATININE, CA)2022 04:46:12





             Test Item    Value        Reference Range Interpretation Comments

 

             NA (test code = 136 mmol/L   135-145                   



             8824292492)                                         

 

             K (test code = 5.1 mmol/L   3.5-5        H            



             1132795660)                                         

 

             CL (test code = 106 mmol/L                       



             2956538203)                                         

 

             CO2 TOTAL (test code = 9 mmol/L     23-31        L            



             2752151343)                                         

 

             AGAP (test code =              2-16         H            



             3396693994)                                         

 

             BUN (test code = 11 mg/dL     7-23                      



             6024697805)                                         

 

             GLUCOSE (test code = 405 mg/dL           H            



             4713057631)                                         

 

             CREATININE (test code = 0.62 mg/dL   0.6-1.25                  



             0360693511)                                         

 

             CALCIUM (test code = 9.5 mg/dL    8.6-10.6                  



             7671676269)                                         

 

             eGFR (test code =              mL/min/1.73m2              



             8192149790)                                         

 

             MAL (test code = MAL) Association of                           



                          Glomerular Filtration                           



                          Rate (GFR) and Staging                           



                          of Kidney Disease*                           



                          +---------------------                           



                          --+-------------------                           



                          --+-------------------                           



                          ------+| GFR                           



                          (mL/min/1.73 m2) ?|                           



                          With Kidney Damage ?|                           



                          ?Without Kidney                           



                          Damage+---------------                           



                          --------+-------------                           



                          --------+-------------                           



                          ------------+| ?>90 ?                           



                          ? ? ? ? ? ? ? ?|                           



                          ?Stage one ? ? ? ? ?|                           



                          ? Normal ? ? ? ? ? ? ?                           



                          ?+--------------------                           



                          ---+------------------                           



                          ---+------------------                           



                          -------+| ?60-89 ? ? ?                           



                          ? ? ? ? ?| ?Stage two                           



                          ? ? ? ? ?| ? Decreased                           



                          GFR ? ? ? ?                            



                          +---------------------                           



                          --+-------------------                           



                          --+-------------------                           



                          ------+| ?30-59 ? ? ?                           



                          ? ? ? ? ?| ?Stage                           



                          three ? ? ? ?| ? Stage                           



                          three ? ? ? ? ?                           



                          +---------------------                           



                          --+-------------------                           



                          --+-------------------                           



                          ------+| ?15-29 ? ? ?                           



                          ? ? ? ? ?| ?Stage four                           



                          ? ? ? ? | ? Stage four                           



                          ? ? ? ? ?                              



                          ?+--------------------                           



                          ---+------------------                           



                          ---+------------------                           



                          -------+| ?<15 (or                           



                          dialysis) ? ?| ?Stage                           



                          five ? ? ? ? | ? Stage                           



                          five ? ? ? ? ?                           



                          ?+--------------------                           



                          ---+------------------                           



                          ---+------------------                           



                          -------+ *Each stage                           



                          assumes the associated                           



                          GFR level has been in                           



                          effect for at least                           



                          three months. ?Stages                           



                          1 to 5, with or                           



                          without kidney                           



                          disease, indicate                           



                          chronic kidney                           



                          disease. Notes:                           



                          Determination of                           



                          stages one and two                           



                          (with eGFR                             



                          >59mL/min/1.73 m2)                           



                          requires estimation of                           



                          kidney damage for at                           



                          least three months as                           



                          defined by structural                           



                          or functional                           



                          abnormalities of the                           



                          kidney, manifested by                           



                          either:Pathological                           



                          abnormalities or                           



                          Markers of kidney                           



                          damage (including                           



                          abnormalities in the                           



                          composition of the                           



                          blood or urine or                           



                          abnormalities in                           



                          imaging tests).                           

 

             Lab Interpretation Abnormal                               



             (test code = 81643-7)                                        



[facility] [id number] [facility]43:36





             Test Item    Value        Reference Range Interpretation Comments

 

             PROTIME PATIENT (test              See_Comment                [Auto

mated message]



             code = 5964-2)                                        The system [employer]



                                                                 generated this 

result



                                                                 transmitted ref

erence



                                                                 range: 12.0 - 1

4.7



                                                                 Seconds. The re

ference



                                                                 range was not u

sed to



                                                                 interpret this 

result



                                                                 as normal/abnor

mal.

 

             INR (test code = 6301-6)                                        Nor

mal INR <1.1;



                                                                 Warfarin Therap

eutic



                                                                 range 2.0 to 3.

0 or



                                                                 2.5 to 3.5, dep

ending



                                                                 upon the indica

tions.

 

             Lab Interpretation (test Normal                                 



             code = 03180-7)                                        



[facility] [id number] 04:43:36





             Test Item    Value        Reference Range Interpretation Comments

 

             PROTIME PATIENT (test              See_Comment                [Auto

mated message]



             code = 5964-2)                                        The system [employer]



                                                                 generated this 

result



                                                                 transmitted ref

erence



                                                                 range: 12.0 - 1

4.7



                                                                 Seconds. The re

ference



                                                                 range was not u

sed to



                                                                 interpret this 

result



                                                                 as normal/abnor

mal.

 

             INR (test code = 6301-6)                                        Nor

mal INR <1.1;



                                                                 Warfarin Therap

eutic



                                                                 range 2.0 to 3.

0 or



                                                                 2.5 to 3.5, dep

ending



                                                                 upon the indica

tions.

 

             Lab Interpretation (test Normal                                 



             code = 36806-1)                                        



[facility] [id number] 04:43:36





             Test Item    Value        Reference Range Interpretation Comments

 

             PROTIME PATIENT (test              See_Comment                [Auto

mated message]



             code = 5964-2)                                        The system [employer]



                                                                 generated this 

result



                                                                 transmitted ref

erence



                                                                 range: 12.0 - 1

4.7



                                                                 Seconds. The re

ference



                                                                 range was not u

sed to



                                                                 interpret this 

result



                                                                 as normal/abnor

mal.

 

             INR (test code = 6301-6)                                        Nor

mal INR <1.1;



                                                                 Warfarin Therap

eutic



                                                                 range 2.0 to 3.

0 or



                                                                 2.5 to 3.5, dep

ending



                                                                 upon the indica

tions.

 

             Lab Interpretation (test Normal                                 



             code = 99947-9)                                        



[facility] (ALB,T.PRO,BILI
T,BU/BC,ALT,AST,ALK PHOS)2022 04:40:15





             Test Item    Value        Reference Range Interpretation Comments

 

             TOTAL BILI (test code = 1606563998) 0.9 mg/dL    0.1-1.1           

        

 

             BILI UNCON (test code = 1669666892) 0.3 mg/dL    0.1-1.1           

        

 

             BILI CONJ (test code = 9058237217) 0.0 mg/dL    0-0.3              

       

 

             T PROTEIN (test code = 7102905828) 7.5 g/dL     6.3-8.2            

       

 

             ALBUMIN (test code = 8431782156) 4.0 g/dL     3.5-5                

     

 

             ALK PHOS (test code = 9485201846) 166 U/L             H      

      

 

             ALTv (test code = 1742-6) 28 U/L       5-50                      

 

             AST(SGOT) (test code = 0059974405) 22 U/L       13-40              

       

 

             Lab Interpretation (test code = Abnormal                           

    



             11848-1)                                            



[facility] (ALB,T.PRO,BILI
T,BU/BC,ALT,AST,ALK PHOS)2022 04:40:15





             Test Item    Value        Reference Range Interpretation Comments

 

             TOTAL BILI (test code = 7472539909) 0.9 mg/dL    0.1-1.1           

        

 

             BILI UNCON (test code = 4156128361) 0.3 mg/dL    0.1-1.1           

        

 

             BILI CONJ (test code = 0018201790) 0.0 mg/dL    0-0.3              

       

 

             T PROTEIN (test code = 5184926750) 7.5 g/dL     6.3-8.2            

       

 

             ALBUMIN (test code = 0373596124) 4.0 g/dL     3.5-5                

     

 

             ALK PHOS (test code = 3314123693) 166 U/L             H      

      

 

             ALTv (test code = 1742-6) 28 U/L       5-50                      

 

             AST(SGOT) (test code = 0822463697) 22 U/L       13-40              

       

 

             Lab Interpretation (test code = Abnormal                           

    



             23679-9)                                            



[facility]89[facility] (ALB,T.PRO,BILI
T,BU/BC,ALT,AST,ALK PHOS)2022 04:40:15





             Test Item    Value        Reference Range Interpretation Comments

 

             TOTAL BILI (test code = 4660007392) 0.9 mg/dL    0.1-1.1           

        

 

             BILI UNCON (test code = 7882368948) 0.3 mg/dL    0.1-1.1           

        

 

             BILI CONJ (test code = 6996069020) 0.0 mg/dL    0-0.3              

       

 

             T PROTEIN (test code = 5756308359) 7.5 g/dL     6.3-8.2            

       

 

             ALBUMIN (test code = 2754470707) 4.0 g/dL     3.5-5                

     

 

             ALK PHOS (test code = 5405980677) 166 U/L             H      

      

 

             ALTv (test code = 1742-6) 28 U/L       5-50                      

 

             AST(SGOT) (test code = 7560282209) 22 U/L       13-40              

       

 

             Lab Interpretation (test code = Abnormal                           

    



             46198-3)                                            



[facility]





             Test Item    Value        Reference Range Interpretation Comments

 

             LIPASE (test code = 7438185676) 239 U/L      0-220        H        

    

 

             Lab Interpretation (test code = Abnormal                           

    



             73281-8)                                            



[facility]





             Test Item    Value        Reference Range Interpretation Comments

 

             LIPASE (test code = 4502240846) 239 U/L      0-220        H        

    

 

             Lab Interpretation (test code = Abnormal                           

    



             13582-7)                                            



[facility]





             Test Item    Value        Reference Range Interpretation Comments

 

             LIPASE (test code = 2838990373) 239 U/L      0-220        H        

    

 

             Lab Interpretation (test code = Abnormal                           

    



             77541-4)                                            



[facility] [id number] 02:01:26





             Test Item    Value        Reference Range Interpretation Comments

 

             Blood Culture-Aerobic No organisms No growth                 Previo

us



             (test code = 17928-3) isolated                               prelim

inary



                                                                 verified result



                                                                 was Culture In



                                                                 Progress on



                                                                 2022 at 00

02



                                                                 CDTPrevious



                                                                 preliminary



                                                                 verified result



                                                                 was No growth a

t



                                                                 24 hours on



                                                                 2022 at 21

01



                                                                 CDTPrevious



                                                                 preliminary



                                                                 verified result



                                                                 was No growth a

t



                                                                 48 hours on



                                                                 2022 at 21

01



                                                                 CDTPrevious



                                                                 preliminary



                                                                 verified result



                                                                 was No growth a

t



                                                                 72 hours on



                                                                 2022 at 21

01



                                                                 CDT

 

             Blood        No organisms No growth                 Previous



             Culture-Anaerobic isolated                               preliminar

y



             (test code = 87814-1)                                        verifi

ed result



                                                                 was Culture In



                                                                 Progress on



                                                                 2022 at 00

02



                                                                 CDTPrevious



                                                                 preliminary



                                                                 verified result



                                                                 was No growth a

t



                                                                 24 hours on



                                                                 2022 at 21

01



                                                                 CDTPrevious



                                                                 preliminary



                                                                 verified result



                                                                 was No growth a

t



                                                                 48 hours on



                                                                 2022 at 21

01



                                                                 CDTPrevious



                                                                 preliminary



                                                                 verified result



                                                                 was No growth a

t



                                                                 72 hours on



                                                                 2022 at 21

01



                                                                 CDT

 

             Lab Interpretation Normal                                 



             (test code = 77510-4)                                        



[facility] GLUCOSE (AUTOMATED)2022 19:39:16





             Test Item    Value        Reference Range Interpretation Comments

 

             POCT GLU (test code = 4667246886) 153 mg/dL           H      

      

 

             Lab Interpretation (test code = Abnormal                           

    



             41597-7)                                            



[facility] GLUCOSE (AUTOMATED)2022 16:42:59





             Test Item    Value        Reference Range Interpretation Comments

 

             POCT GLU (test code = 3427737764) 311 mg/dL           H      

      

 

             Lab Interpretation (test code = Abnormal                           

    



             33264-5)                                            



[facility] GLUCOSE (AUTOMATED)2022 01:48:17





             Test Item    Value        Reference Range Interpretation Comments

 

             POCT GLU (test code = 3022574233) 253 mg/dL           H      

      

 

             Lab Interpretation (test code = Abnormal                           

    



             61471-3)                                            



[facility] GLUCOSE (AUTOMATED)2022 21:48:07





             Test Item    Value        Reference Range Interpretation Comments

 

             POCT GLU (test code = 8047221685) 279 mg/dL           H      

      

 

             Lab Interpretation (test code = Abnormal                           

    



             85005-2)                                            



[facility] GLUCOSE (AUTOMATED)2022 16:48:09





             Test Item    Value        Reference Range Interpretation Comments

 

             POCT GLU (test code = 6564832053) 275 mg/dL           H      

      

 

             Lab Interpretation (test code = Abnormal                           

    



             16043-7)                                            



[facility] METABOLIC PANEL (NA, K, CL, CO2, 
GLUCOSE, BUN, CREATININE, CA)2022 15:41:54





             Test Item    Value        Reference Range Interpretation Comments

 

             NA (test code = 148 mmol/L   135-145      H            



             9497108168)                                         

 

             K (test code = 4.3 mmol/L   3.5-5                     



             6945665229)                                         

 

             CL (test code = 123 mmol/L          H            



             5592153214)                                         

 

             CO2 TOTAL (test code = 19 mmol/L    23-31        L            



             8919021079)                                         

 

             AGAP (test code =              2-16                      



             1118721177)                                         

 

             BUN (test code = 23 mg/dL     7-23                      



             4328254617)                                         

 

             GLUCOSE (test code = 305 mg/dL           H            



             9505384426)                                         

 

             CREATININE (test code = 0.61 mg/dL   0.6-1.25                  



             6581393098)                                         

 

             CALCIUM (test code = 8.5 mg/dL    8.6-10.6     L            



             5621657428)                                         

 

             eGFR (test code =              mL/min/1.73m2              



             8080281228)                                         

 

             MAL (test code = MAL) Association of                           



                          Glomerular Filtration                           



                          Rate (GFR) and Staging                           



                          of Kidney Disease*                           



                          +---------------------                           



                          --+-------------------                           



                          --+-------------------                           



                          ------+| GFR                           



                          (mL/min/1.73 m2) ?|                           



                          With Kidney Damage ?|                           



                          ?Without Kidney                           



                          Damage+---------------                           



                          --------+-------------                           



                          --------+-------------                           



                          ------------+| ?>90 ?                           



                          ? ? ? ? ? ? ? ?|                           



                          ?Stage one ? ? ? ? ?|                           



                          ? Normal ? ? ? ? ? ? ?                           



                          ?+--------------------                           



                          ---+------------------                           



                          ---+------------------                           



                          -------+| ?60-89 ? ? ?                           



                          ? ? ? ? ?| ?Stage two                           



                          ? ? ? ? ?| ? Decreased                           



                          GFR ? ? ? ?                            



                          +---------------------                           



                          --+-------------------                           



                          --+-------------------                           



                          ------+| ?30-59 ? ? ?                           



                          ? ? ? ? ?| ?Stage                           



                          three ? ? ? ?| ? Stage                           



                          three ? ? ? ? ?                           



                          +---------------------                           



                          --+-------------------                           



                          --+-------------------                           



                          ------+| ?15-29 ? ? ?                           



                          ? ? ? ? ?| ?Stage four                           



                          ? ? ? ? | ? Stage four                           



                          ? ? ? ? ?                              



                          ?+--------------------                           



                          ---+------------------                           



                          ---+------------------                           



                          -------+| ?<15 (or                           



                          dialysis) ? ?| ?Stage                           



                          five ? ? ? ? | ? Stage                           



                          five ? ? ? ? ?                           



                          ?+--------------------                           



                          ---+------------------                           



                          ---+------------------                           



                          -------+ *Each stage                           



                          assumes the associated                           



                          GFR level has been in                           



                          effect for at least                           



                          three months. ?Stages                           



                          1 to 5, with or                           



                          without kidney                           



                          disease, indicate                           



                          chronic kidney                           



                          disease. Notes:                           



                          Determination of                           



                          stages one and two                           



                          (with eGFR                             



                          >59mL/min/1.73 m2)                           



                          requires estimation of                           



                          kidney damage for at                           



                          least three months as                           



                          defined by structural                           



                          or functional                           



                          abnormalities of the                           



                          kidney, manifested by                           



                          either:Pathological                           



                          abnormalities or                           



                          Markers of kidney                           



                          damage (including                           



                          abnormalities in the                           



                          composition of the                           



                          blood or urine or                           



                          abnormalities in                           



                          imaging tests).                           

 

             Lab Interpretation Abnormal                               



             (test code = 85849-3)                                        



[facility]





             Test Item    Value        Reference Range Interpretation Comments

 

             MAGNESIUM (test code = 0962099032) 1.8 mg/dL    1.7-2.4            

       

 

             Lab Interpretation (test code = Normal                             

    



             60290-4)                                            



[facility]





             Test Item    Value        Reference Range Interpretation Comments

 

             PHOSPHORUS (test code = 3594918746) 2.2 mg/dL    2.5-5        L    

        

 

             Lab Interpretation (test code = Abnormal                           

    



             41396-0)                                            



[facility] GLUCOSE (AUTOMATED)2022 12:48:51





             Test Item    Value        Reference Range Interpretation Comments

 

             POCT GLU (test code = 7912942292) 274 mg/dL           H      

      

 

             Lab Interpretation (test code = Abnormal                           

    



             84884-0)                                            



[facility] GLUCOSE (AUTOMATED)2022 01:10:30





             Test Item    Value        Reference Range Interpretation Comments

 

             POCT GLU (test code = 6549834402) 248 mg/dL           H      

      

 

             Lab Interpretation (test code = Abnormal                           

    



             22856-8)                                            



[facility] GLUCOSE (AUTOMATED)2022 01:10:30





             Test Item    Value        Reference Range Interpretation Comments

 

             POCT GLU (test code = 1924191090) 300 mg/dL           H      

      

 

             Lab Interpretation (test code = Abnormal                           

    



             14260-6)                                            



[facility] GLUCOSE (AUTOMATED)2022 16:52:44





             Test Item    Value        Reference Range Interpretation Comments

 

             POCT GLU (test code = 9943646821) 296 mg/dL           H      

      

 

             Lab Interpretation (test code = Abnormal                           

    



             32195-2)                                            



[facility] GLUCOSE (AUTOMATED)2022 16:52:43





             Test Item    Value        Reference Range Interpretation Comments

 

             POCT GLU (test code = 3249696290) 345 mg/dL           H      

      

 

             Lab Interpretation (test code = Abnormal                           

    



             47705-5)                                            



[facility] GLUCOSE (AUTOMATED)2022 16:52:38





             Test Item    Value        Reference Range Interpretation Comments

 

             POCT GLU (test code = 0155599713) 363 mg/dL           H      

      

 

             Lab Interpretation (test code = Abnormal                           

    



             52766-9)                                            



[facility] GLUCOSE (AUTOMATED)2022 16:52:38





             Test Item    Value        Reference Range Interpretation Comments

 

             POCT GLU (test code = 0029909149) 444 mg/dL           H      

      

 

             Lab Interpretation (test code = Abnormal                           

    



             17766-3)                                            



[facility] GLUCOSE (AUTOMATED)2022 16:52:38





             Test Item    Value        Reference Range Interpretation Comments

 

             POCT GLU (test code = 1271889289) 324 mg/dL           H      

      

 

             Lab Interpretation (test code = Abnormal                           

    



             52883-7)                                            



[facility] GLUCOSE (AUTOMATED)2022 16:52:38





             Test Item    Value        Reference Range Interpretation Comments

 

             POCT GLU (test code = 6714893906) 303 mg/dL           H      

      

 

             Lab Interpretation (test code = Abnormal                           

    



             98810-7)                                            



[facility] GLUCOSE (AUTOMATED)2022 16:52:38





             Test Item    Value        Reference Range Interpretation Comments

 

             POCT GLU (test code = 6655743936) 288 mg/dL           H      

      

 

             Lab Interpretation (test code = Abnormal                           

    



             58975-0)                                            



[facility] GLUCOSE (AUTOMATED)2022 16:37:12





             Test Item    Value        Reference Range Interpretation Comments

 

             POCT GLU (test code = 7280932250) 241 mg/dL           H      

      

 

             Lab Interpretation (test code = Abnormal                           

    



             44620-8)                                            



[facility] GLUCOSE (AUTOMATED)2022 13:14:11





             Test Item    Value        Reference Range Interpretation Comments

 

             POCT GLU (test code = 4863300311) 264 mg/dL           H      

      

 

             Lab Interpretation (test code = Abnormal                           

    



             26973-0)                                            



[facility] GLUCOSE (AUTOMATED)2022 11:04:52





             Test Item    Value        Reference Range Interpretation Comments

 

             POCT GLU (test code = 3660522862) 257 mg/dL           H      

      

 

             Lab Interpretation (test code = Abnormal                           

    



             69224-5)                                            



[facility] GLUCOSE (AUTOMATED)2022 07:51:27





             Test Item    Value        Reference Range Interpretation Comments

 

             POCT GLU (test code = 9747145916) 228 mg/dL           H      

      

 

             Lab Interpretation (test code = Abnormal                           

    



             36852-3)                                            



[facility] GLUCOSE (AUTOMATED)2022 03:43:14





             Test Item    Value        Reference Range Interpretation Comments

 

             POCT GLU (test code = 6873719132) 269 mg/dL           H      

      

 

             Lab Interpretation (test code = Abnormal                           

    



             09138-5)                                            



[facility] GLUCOSE (AUTOMATED)2022 00:53:27





             Test Item    Value        Reference Range Interpretation Comments

 

             POCT GLU (test code = 2760087866) 342 mg/dL           H      

      

 

             Lab Interpretation (test code = Abnormal                           

    



             05359-0)                                            



[facility] Metabolic Panel (Na, K, Cl, CO2, 
Glucose, BUN, Creatinine, Ca)2022 00:26:35





             Test Item    Value        Reference Range Interpretation Comments

 

             NA (test code = 161 mmol/L   135-145      HH           



             9611405183)                                         

 

             K (test code = 5.3 mmol/L   3.5-5        H            



             0875623615)                                         

 

             CL (test code = 131 mmol/L          H            



             3701761991)                                         

 

             CO2 TOTAL (test code = 14 mmol/L    23-31        L            



             9864184332)                                         

 

             AGAP (test code =              2-16                      



             8357433568)                                         

 

             BUN (test code = 40 mg/dL     7-23         H            



             2546193905)                                         

 

             GLUCOSE (test code = 363 mg/dL           H            



             7694227673)                                         

 

             CREATININE (test code = 1.31 mg/dL   0.6-1.25     H            



             7718384454)                                         

 

             CALCIUM (test code = 9.0 mg/dL    8.6-10.6                  



             9330992208)                                         

 

             eGFR (test code =              mL/min/1.73m2              



             3380947485)                                         

 

             MAL (test code = MAL) Association of                           



                          Glomerular Filtration                           



                          Rate (GFR) and Staging                           



                          of Kidney Disease*                           



                          +---------------------                           



                          --+-------------------                           



                          --+-------------------                           



                          ------+| GFR                           



                          (mL/min/1.73 m2) ?|                           



                          With Kidney Damage ?|                           



                          ?Without Kidney                           



                          Damage+---------------                           



                          --------+-------------                           



                          --------+-------------                           



                          ------------+| ?>90 ?                           



                          ? ? ? ? ? ? ? ?|                           



                          ?Stage one ? ? ? ? ?|                           



                          ? Normal ? ? ? ? ? ? ?                           



                          ?+--------------------                           



                          ---+------------------                           



                          ---+------------------                           



                          -------+| ?60-89 ? ? ?                           



                          ? ? ? ? ?| ?Stage two                           



                          ? ? ? ? ?| ? Decreased                           



                          GFR ? ? ? ?                            



                          +---------------------                           



                          --+-------------------                           



                          --+-------------------                           



                          ------+| ?30-59 ? ? ?                           



                          ? ? ? ? ?| ?Stage                           



                          three ? ? ? ?| ? Stage                           



                          three ? ? ? ? ?                           



                          +---------------------                           



                          --+-------------------                           



                          --+-------------------                           



                          ------+| ?15-29 ? ? ?                           



                          ? ? ? ? ?| ?Stage four                           



                          ? ? ? ? | ? Stage four                           



                          ? ? ? ? ?                              



                          ?+--------------------                           



                          ---+------------------                           



                          ---+------------------                           



                          -------+| ?<15 (or                           



                          dialysis) ? ?| ?Stage                           



                          five ? ? ? ? | ? Stage                           



                          five ? ? ? ? ?                           



                          ?+--------------------                           



                          ---+------------------                           



                          ---+------------------                           



                          -------+ *Each stage                           



                          assumes the associated                           



                          GFR level has been in                           



                          effect for at least                           



                          three months. ?Stages                           



                          1 to 5, with or                           



                          without kidney                           



                          disease, indicate                           



                          chronic kidney                           



                          disease. Notes:                           



                          Determination of                           



                          stages one and two                           



                          (with eGFR                             



                          >59mL/min/1.73 m2)                           



                          requires estimation of                           



                          kidney damage for at                           



                          least three months as                           



                          defined by structural                           



                          or functional                           



                          abnormalities of the                           



                          kidney, manifested by                           



                          either:Pathological                           



                          abnormalities or                           



                          Markers of kidney                           



                          damage (including                           



                          abnormalities in the                           



                          composition of the                           



                          blood or urine or                           



                          abnormalities in                           



                          imaging tests).                           

 

             Lab Interpretation Abnormal                               



             (test code = 30787-1)                                        



[facility] GLUCOSE (AUTOMATED)2022 22:31:31





             Test Item    Value        Reference Range Interpretation Comments

 

             POCT GLU (test code = 7399034556) 349 mg/dL           H      

      

 

             Lab Interpretation (test code = Abnormal                           

    



             61568-6)                                            



[facility] GLUCOSE (AUTOMATED)2022 20:57:05





             Test Item    Value        Reference Range Interpretation Comments

 

             POCT GLU (test code = 9221387951)                     HH     

      

 

             Lab Interpretation (test code = Abnormal                           

    



             04251-0)                                            



[facility] GLUCOSE (AUTOMATED)2022 20:57:00





             Test Item    Value        Reference Range Interpretation Comments

 

             POCT GLU (test code = 1984632022)                     HH     

      

 

             Lab Interpretation (test code = Abnormal                           

    



             53638-0)                                            



[facility] GLUCOSE (AUTOMATED)2022 20:57:00





             Test Item    Value        Reference Range Interpretation Comments

 

             POCT GLU (test code = 2462207060)                     HH     

      

 

             Lab Interpretation (test code = Abnormal                           

    



             41722-1)                                            



[facility] GLUCOSE (AUTOMATED)2022 20:57:00





             Test Item    Value        Reference Range Interpretation Comments

 

             POCT GLU (test code = 2064236883)                     HH     

      

 

             Lab Interpretation (test code = Abnormal                           

    



             98129-9)                                            



[id number] 12[facility]58:42





             Test Item    Value        Reference Range Interpretation Comments

 

             LACTIC ACID (test code = 4.32 mmol/L  0.5-2.2      H            



             7781427883)                                         

 

             Lab Interpretation (test code = Abnormal                           

    



             11857-8)                                            



[facility]697[facility] 03:51:48





             Test Item    Value        Reference Range Interpretation Comments

 

             PHOSPHORUS (test code = 6.7 mg/dL    2.5-5        H            Slig

ht hemolysis



             8789021358)                                         

 

             Lab Interpretation (test Abnormal                               



             code = 83126-8)                                        



[facility]7295-64-37 03:51:48





             Test Item    Value        Reference Range Interpretation Comments

 

             MAGNESIUM (test code = 6410904043) 2.8 mg/dL    1.7-2.4      H     

       

 

             Lab Interpretation (test code = Abnormal                           

    



             11220-4)                                            



[facility]204[facility] 
02:16:52





             Test Item    Value        Reference Range Interpretation Comments

 

             NA (test code = 166 mmol/L   135-145      HH           



             7069403091)                                         

 

             K (test code = 5.2 mmol/L   3.5-5        H            



             6119551566)                                         

 

             CL (test code = 123 mmol/L          H            



             2924702875)                                         

 

             CO2 TOTAL (test code = 12 mmol/L    23-31        L            



             9626101161)                                         

 

             AGAP (test code =              2-16         H            



             2972959985)                                         

 

             BUN (test code = 35 mg/dL     7-23         H            



             3432985045)                                         

 

             GLUCOSE (test code = 941 mg/dL           HH           



             6707542979)                                         

 

             CREATININE (test code = 1.51 mg/dL   0.6-1.25     H            



             1278057314)                                         

 

             TOTAL BILI (test code = 1.1 mg/dL    0.1-1.1                   



             6509278388)                                         

 

             CALCIUM (test code = 10.7 mg/dL   8.6-10.6     H            



             5932346895)                                         

 

             T PROTEIN (test code = 8.2 g/dL     6.3-8.2                   



             6069970362)                                         

 

             ALBUMIN (test code = 4.5 g/dL     3.5-5                     



             2504855722)                                         

 

             ALK PHOS (test code = 201 U/L             H            



             4730374493)                                         

 

             ALTv (test code = 43 U/L       5-50                      



             1742-6)                                             

 

             AST(SGOT) (test code = 27 U/L       13-40                     



             0808502728)                                         

 

             eGFR (test code =              mL/min/1.73m2              



             8210943411)                                         

 

             MAL (test code = MAL) Association of                           



                          Glomerular Filtration                           



                          Rate (GFR) and Staging                           



                          of Kidney Disease*                           



                          +---------------------                           



                          --+-------------------                           



                          --+-------------------                           



                          ------+| GFR                           



                          (mL/min/1.73 m2) ?|                           



                          With Kidney Damage ?|                           



                          ?Without Kidney                           



                          Damage+---------------                           



                          --------+-------------                           



                          --------+-------------                           



                          ------------+| ?>90 ?                           



                          ? ? ? ? ? ? ? ?|                           



                          ?Stage one ? ? ? ? ?|                           



                          ? Normal ? ? ? ? ? ? ?                           



                          ?+--------------------                           



                          ---+------------------                           



                          ---+------------------                           



                          -------+| ?60-89 ? ? ?                           



                          ? ? ? ? ?| ?Stage two                           



                          ? ? ? ? ?| ? Decreased                           



                          GFR ? ? ? ?                            



                          +---------------------                           



                          --+-------------------                           



                          --+-------------------                           



                          ------+| ?30-59 ? ? ?                           



                          ? ? ? ? ?| ?Stage                           



                          three ? ? ? ?| ? Stage                           



                          three ? ? ? ? ?                           



                          +---------------------                           



                          --+-------------------                           



                          --+-------------------                           



                          ------+| ?15-29 ? ? ?                           



                          ? ? ? ? ?| ?Stage four                           



                          ? ? ? ? | ? Stage four                           



                          ? ? ? ? ?                              



                          ?+--------------------                           



                          ---+------------------                           



                          ---+------------------                           



                          -------+| ?<15 (or                           



                          dialysis) ? ?| ?Stage                           



                          five ? ? ? ? | ? Stage                           



                          five ? ? ? ? ?                           



                          ?+--------------------                           



                          ---+------------------                           



                          ---+------------------                           



                          -------+ *Each stage                           



                          assumes the associated                           



                          GFR level has been in                           



                          effect for at least                           



                          three months. ?Stages                           



                          1 to 5, with or                           



                          without kidney                           



                          disease, indicate                           



                          chronic kidney                           



                          disease. Notes:                           



                          Determination of                           



                          stages one and two                           



                          (with eGFR                             



                          >59mL/min/1.73 m2)                           



                          requires estimation of                           



                          kidney damage for at                           



                          least three months as                           



                          defined by structural                           



                          or functional                           



                          abnormalities of the                           



                          kidney, manifested by                           



                          either:Pathological                           



                          abnormalities or                           



                          Markers of kidney                           



                          damage (including                           



                          abnormalities in the                           



                          composition of the                           



                          blood or urine or                           



                          abnormalities in                           



                          imaging tests).                           

 

             Lab Interpretation Abnormal                               



             (test code = 31590-6)                                        



[facility]





             Test Item    Value        Reference    Interpretation Comments



                                       Range                     

 

             TROPONIN I (test 0.005 ng/mL  See_Comment                [Automated



             code = 8754893487)                                        message] 

The



                                                                 system which



                                                                 generated this



                                                                 result



                                                                 transmitted



                                                                 reference range

:



                                                                 <=0.034. The



                                                                 reference range



                                                                 was not used to



                                                                 interpret this



                                                                 result as



                                                                 normal/abnormal

.

 

             MAL (test code = Reference (Normal)                           



             MAL)         Range (defined by                           



                          the 99th percentile                           



                          reference limit): <=                           



                          0.034 ng/mL Note:                           



                          Cardiac troponin                           



                          begins to rise 3-4                           



                          hours after the                           



                          onset of ischemia.                           



                          Repeat in 4-6 hours                           



                          if the sample was                           



                          drawn within 3-4                           



                          hours of the onset                           



                          of the symptom and                           



                          found normal.                           



                          Diagnosis of                           



                          myocardial injury is                           



                          made with acute                           



                          changes in cTn                           



                          concentrations with                           



                          at least one serial                           



                          sample above the                           



                          99th percentile                           



                          upper reference                           



                          limit (URL), taken                           



                          together with the                           



                          patient's clinical                           



                          presentation. Biotin                           



                          has been reported to                           



                          cause a negative                           



                          bias, interpret                           



                          results relative to                           



                          patient's use of                           



                          biotin.                                

 

             Lab Interpretation Normal                                 



             (test code =                                        



             93196-5)                                            



[facility]
SALICYLATE&lt;10mg/ 9:06 PM [facility] 
LABORATORYTherapeutic Range: ? ?? ? ? Analgesic and Antipyretic Use ? ? ? ? 20-
100 mg/L ? ? Anti-Inflammatory Use ? ? ? ? ? ? ? ? 100-250 mg/L Toxic Range: ? ?
? ? ? ? ? ? ? ? ? ? ? ? ? Greater than 300 mg/LUnH[facility] Clear LakeSALICYLATE2022-07-27 02:06:11SALICYLATE&lt;10mg/ 9:06 PM 
[facility] LABORATORYTherapeutic Range: ? ?? ? ? Analgesic and
Antipyretic Use ? ? ? ?  mg/L ? ? Anti-Inflammatory Use ? ? ? ? ? ? ? ? 
100-250 mg/L Toxic Range: ? ? ? ? ? ? ? ? ? ? ? ? ? ? ? Greater than 300 mg/L
[facility]
ALCOHOL&lt;10mg/dL2022 9:05 PM [facility] 
LABORATORY&lt;10 [id number] Toxic&gt;100 Depression of CNS&gt;400 Fatalities
ReportedUnH[facility] LakeETHANOL2022-07-27 02:05:51
ALCOHOL&lt;10mg/dL2022 9:05 PM [facility] 
LABORATORY&lt;10 Klnipljp77-274 Toxic&gt;100 Depression of CNS&gt;400 Fatalities
ReportedUnH[facility] LakeACETAMINOPHEN2022-07-27 02:03:04





             Test Item    Value        Reference Range Interpretation Comments

 

             ACETAMINOP (test code =              10-30        L            



             1022356540)                                         

 

             MAL (test code = MAL) Toxic: Greater than                          

 



                          200 ug/mL @ 4 hour                           



                          post ingestion or                           



                          greater than 50                           



                          ug/mL @ 12 hour post                           



                          ingestion                              

 

             Lab Interpretation (test Abnormal                               



             code = 51679-6)                                        



[facility]:04





             Test Item    Value        Reference Range Interpretation Comments

 

             ACETAMINOP (test code =              10-30        L            



             9237544556)                                         

 

             MAL (test code = MAL) Toxic: Greater than                          

 



                          200 ug/mL @ 4 hour                           



                          post ingestion or                           



                          greater than 50                           



                          ug/mL @ 12 hour post                           



                          ingestion                              

 

             Lab Interpretation (test Abnormal                               



             code = 85923-9)                                        



[facility]





             Test Item    Value        Reference Range Interpretation Comments

 

             LIPASE (test code = 0620253441) 246 U/L      0-220        H        

    

 

             Lab Interpretation (test code = Abnormal                           

    



             59929-7)                                            



[facility] WITH QSRB1406-45-11 01:12:41





             Test Item    Value        Reference Range Interpretation Comments

 

             WBC (test code =              See_Comment  H             [Automated



             6390-2)                                             message] The



                                                                 system which



                                                                 generated this



                                                                 result transmit

huma



                                                                 reference range

:



                                                                 4.20 - 10.70



                                                                 10*3/?L. The



                                                                 reference range



                                                                 was not used to



                                                                 interpret this



                                                                 result as



                                                                 normal/abnormal

.

 

             RBC (test code =              See_Comment  H             [Automated



             789-8)                                              message] The



                                                                 system which



                                                                 generated this



                                                                 result transmit

huma



                                                                 reference range

:



                                                                 4.26 - 5.52



                                                                 10*6/?L. The



                                                                 reference range



                                                                 was not used to



                                                                 interpret this



                                                                 result as



                                                                 normal/abnormal

.

 

             HGB (test code = 18.3 g/dL    12.2-16.4    H            



             718-7)                                              

 

             HCT (test code = 57.6 %       38.4-49.3    H            



             4544-3)                                             

 

             MCV (test code = 90.7 fL      81.7-95.6                 



             787-2)                                              

 

             MCH (test code = 28.8 pg      26.1-32.7                 



             785-6)                                              

 

             MCHC (test code = 31.8 g/dL    31.2-35                   



             786-4)                                              

 

             RDW-SD (test code = 50.8 fL      38.5-51.6                 



             56092-2)                                            

 

             RDW-CV (test code = 16.4 %       12.1-15.4    H            



             788-0)                                              

 

             PLT (test code =              See_Comment  H             [Automated



             777-3)                                              message] The



                                                                 system which



                                                                 generated this



                                                                 result transmit

huma



                                                                 reference range

:



                                                                 150 - 328 10*3/

?L.



                                                                 The reference



                                                                 range was not u

sed



                                                                 to interpret th

is



                                                                 result as



                                                                 normal/abnormal

.

 

             MPV (test code = 11.6 fL      9.8-13                    



             64564-4)                                            

 

             NRBC/100 WBC (test              See_Comment                [Automat

ed



             code = 7285458160)                                        message] 

The



                                                                 system which



                                                                 generated this



                                                                 result transmit

huma



                                                                 reference range

:



                                                                 0.0 - 10.0 /100



                                                                 WBCs. The



                                                                 reference range



                                                                 was not used to



                                                                 interpret this



                                                                 result as



                                                                 normal/abnormal

.

 

             NRBC x10^3 (test code              See_Comment                [Auto

mated



             = 0988979797)                                        message] The



                                                                 system which



                                                                 generated this



                                                                 result transmit

huma



                                                                 reference range

:



                                                                 10*3/?L. The



                                                                 reference range



                                                                 was not used to



                                                                 interpret this



                                                                 result as



                                                                 normal/abnormal

.

 

             SEG % (test code = 77 %         33-76        H            



             24017-8)                                            

 

             BAND % (test code = 9 %          0-1          H            



             95514-0)                                            

 

             LYMPH % (test code = 7 %          14-54        L            



             73887-4)                                            

 

             MONO % (test code = 7 %          0-4          H            



             26485-3)                                            

 

             ANC (test code = 15.98 10*3/uL 1.99-6.95    H            



             753-4)                                              

 

             OLENA CELLS (test code 2+           See_Comment  A             [Auto

mated



             = 7790-9)                                           message] The



                                                                 system which



                                                                 generated this



                                                                 result transmit

huma



                                                                 reference range

:



                                                                 (none). The



                                                                 reference range



                                                                 was not used to



                                                                 interpret this



                                                                 result as



                                                                 normal/abnormal

.

 

             Lab Interpretation Abnormal                               



             (test code = 16282-5)                                        



[facility] GLUCOSE (AUTOMATED)2022 01:30:22





             Test Item    Value        Reference Range Interpretation Comments

 

             POCT GLU (test code = 1408790507) 392 mg/dL           H      

      

 

             Lab Interpretation (test code = Abnormal                           

    



             70400-1)                                            



[facility] GLUCOSE(AGE &gt;30DAYS)2022 
01:30:00





             Test Item    Value        Reference Range Interpretation Comments

 

             POCT Glu (age>30days) 392 mg/dL,                      



             (test code = 3342) Singer informed                           

 

             Lab Interpretation (test Normal                                 



             code = 41842-9)                                        



[facility] GLUCOSE (AUTOMATED)2022 00:43:22





             Test Item    Value        Reference Range Interpretation Comments

 

             POCT GLU (test code = 6912738072) 430 mg/dL           H      

      

 

             Lab Interpretation (test code = Abnormal                           

    



             85951-2)                                            



[facility] GLUCOSE (AUTOMATED)2022-07-15 23:29:00





             Test Item    Value        Reference Range Interpretation Comments

 

             POCT GLU (test code = 3197519103) 493 mg/dL           HH     

      

 

             Lab Interpretation (test code = Abnormal                           

    



             80906-7)                                            



[facility] METABOLIC PANEL (NA, K, CL, CO2, 
GLUCOSE, BUN, CREATININE, CA)2022-07-15 22:56:10





             Test Item    Value        Reference Range Interpretation Comments

 

             NA (test code = 132 mmol/L   135-145      L            



             0322006289)                                         

 

             K (test code = 4.4 mmol/L   3.5-5                     



             2296054548)                                         

 

             CL (test code = 98 mmol/L                        



             5350101628)                                         

 

             CO2 TOTAL (test code = 19 mmol/L    23-31        L            



             5547560021)                                         

 

             AGAP (test code =              2-16                      



             2502733183)                                         

 

             BUN (test code = 11 mg/dL     7-23                      



             4921116106)                                         

 

             GLUCOSE (test code = 519 mg/dL           HH           



             4358071129)                                         

 

             CREATININE (test code = 0.55 mg/dL   0.6-1.25     L            



             9367057952)                                         

 

             CALCIUM (test code = 9.5 mg/dL    8.6-10.6                  



             0401624235)                                         

 

             eGFR (test code =              mL/min/1.73m2              



             7539462605)                                         

 

             MAL (test code = MAL) Association of                           



                          Glomerular Filtration                           



                          Rate (GFR) and Staging                           



                          of Kidney Disease*                           



                          +---------------------                           



                          --+-------------------                           



                          --+-------------------                           



                          ------+| GFR                           



                          (mL/min/1.73 m2) ?|                           



                          With Kidney Damage ?|                           



                          ?Without Kidney                           



                          Damage+---------------                           



                          --------+-------------                           



                          --------+-------------                           



                          ------------+| ?>90 ?                           



                          ? ? ? ? ? ? ? ?|                           



                          ?Stage one ? ? ? ? ?|                           



                          ? Normal ? ? ? ? ? ? ?                           



                          ?+--------------------                           



                          ---+------------------                           



                          ---+------------------                           



                          -------+| ?60-89 ? ? ?                           



                          ? ? ? ? ?| ?Stage two                           



                          ? ? ? ? ?| ? Decreased                           



                          GFR ? ? ? ?                            



                          +---------------------                           



                          --+-------------------                           



                          --+-------------------                           



                          ------+| ?30-59 ? ? ?                           



                          ? ? ? ? ?| ?Stage                           



                          three ? ? ? ?| ? Stage                           



                          three ? ? ? ? ?                           



                          +---------------------                           



                          --+-------------------                           



                          --+-------------------                           



                          ------+| ?15-29 ? ? ?                           



                          ? ? ? ? ?| ?Stage four                           



                          ? ? ? ? | ? Stage four                           



                          ? ? ? ? ?                              



                          ?+--------------------                           



                          ---+------------------                           



                          ---+------------------                           



                          -------+| ?<15 (or                           



                          dialysis) ? ?| ?Stage                           



                          five ? ? ? ? | ? Stage                           



                          five ? ? ? ? ?                           



                          ?+--------------------                           



                          ---+------------------                           



                          ---+------------------                           



                          -------+ *Each stage                           



                          assumes the associated                           



                          GFR level has been in                           



                          effect for at least                           



                          three months. ?Stages                           



                          1 to 5, with or                           



                          without kidney                           



                          disease, indicate                           



                          chronic kidney                           



                          disease. Notes:                           



                          Determination of                           



                          stages one and two                           



                          (with eGFR                             



                          >59mL/min/1.73 m2)                           



                          requires estimation of                           



                          kidney damage for at                           



                          least three months as                           



                          defined by structural                           



                          or functional                           



                          abnormalities of the                           



                          kidney, manifested by                           



                          either:Pathological                           



                          abnormalities or                           



                          Markers of kidney                           



                          damage (including                           



                          abnormalities in the                           



                          composition of the                           



                          blood or urine or                           



                          abnormalities in                           



                          imaging tests).                           

 

             Lab Interpretation Abnormal                               



             (test code = 14181-2)                                        



[facility] WITH [id number] 22:45:19





             Test Item    Value        Reference Range Interpretation Comments

 

             WBC (test code =              See_Comment                [Automated



             1533-2)                                             message] The sy

stem



                                                                 which generated



                                                                 this result



                                                                 transmitted



                                                                 reference range

:



                                                                 4.20 - 10.70



                                                                 10*3/?L. The



                                                                 reference range

 was



                                                                 not used to



                                                                 interpret this



                                                                 result as



                                                                 normal/abnormal

.

 

             RBC (test code =              See_Comment                [Automated



             462-8)                                              message] The sy

stem



                                                                 which generated



                                                                 this result



                                                                 transmitted



                                                                 reference range

:



                                                                 4.26 - 5.52



                                                                 10*6/?L. The



                                                                 reference range

 was



                                                                 not used to



                                                                 interpret this



                                                                 result as



                                                                 normal/abnormal

.

 

             HGB (test code = 15.7 g/dL    12.2-16.4                 



             718-7)                                              

 

             HCT (test code = 46.0 %       38.4-49.3                 



             4544-3)                                             

 

             MCV (test code = 85.0 fL      81.7-95.6                 



             787-2)                                              

 

             MCH (test code = 29.0 pg      26.1-32.7                 



             785-6)                                              

 

             MCHC (test code = 34.1 g/dL    31.2-35                   



             786-4)                                              

 

             RDW-SD (test code = 41.7 fL      38.5-51.6                 



             80681-1)                                            

 

             RDW-CV (test code = 13.6 %       12.1-15.4                 



             788-0)                                              

 

             PLT (test code =              See_Comment  H             [Automated



             777-3)                                              message] The sy

stem



                                                                 which generated



                                                                 this result



                                                                 transmitted



                                                                 reference range

:



                                                                 150 - 328 10*3/

?L.



                                                                 The reference r

zhen



                                                                 was not used to



                                                                 interpret this



                                                                 result as



                                                                 normal/abnormal

.

 

             MPV (test code = 10.6 fL      9.8-13                    



             24917-8)                                            

 

             NRBC/100 WBC (test              See_Comment                [Automat

ed



             code = 4827031530)                                        message] 

The system



                                                                 which generated



                                                                 this result



                                                                 transmitted



                                                                 reference range

:



                                                                 0.0 - 10.0 /100



                                                                 WBCs. The refer

ence



                                                                 range was not u

sed



                                                                 to interpret th

is



                                                                 result as



                                                                 normal/abnormal

.

 

             NRBC x10^3 (test code              See_Comment                [Auto

mated



             = [id number])                                        message] The s

ystem



                                                                 which generated



                                                                 this result



                                                                 transmitted



                                                                 reference range

:



                                                                 10*3/?L. The



                                                                 reference range

 was



                                                                 not used to



                                                                 interpret this



                                                                 result as



                                                                 normal/abnormal

.

 

             GRAN MAT (NEUT) % 66.6 %                                 



             (test code = 770-8)                                        

 

             IMM GRAN % (test code 0.40 %                                 



             = [id number])                                        

 

             LYMPH % (test code = 20.5 %                                 



             736-9)                                              

 

             MONO % (test code = 10.9 %                                 



             5905-5)                                             

 

             EOS % (test code = 1.5 %                                  



             713-8)                                              

 

             BASO % (test code = 0.1 %                                  



             706-2)                                              

 

             GRAN MAT x10^3(ANC) 4.88 10*3/uL 1.99-6.95                 



             (test code =                                        



             2591741895)                                         

 

             IMM GRAN x10^3 (test 0.03 10*3/uL 0-0.06                    



             code = [id number])                                        

 

             LYMPH x10^3 (test code 1.50 10*3/uL 1.09-3.23                 



             = 731-0)                                            

 

             MONO x10^3 (test code 0.80 10*3/uL 0.36-1.02                 



             = 742-7)                                            

 

             EOS x10^3 (test code = 0.11 10*3/uL 0.06-0.53                 



             711-2)                                              

 

             BASO x10^3 (test code              0.01-0.09                 



             = 704-7)                                            

 

             Lab Interpretation Abnormal                               



             (test code = 25029-5)                                        



[facility] WITH [id number] 11:05:43





             Test Item    Value        Reference Range Interpretation Comments

 

             WBC (test code =              See_Comment  H             [Automated



             6690-2)                                             message] The sy

stem



                                                                 which generated



                                                                 this result



                                                                 transmitted



                                                                 reference range

:



                                                                 4.20 - 10.70



                                                                 10*3/?L. The



                                                                 reference range

 was



                                                                 not used to



                                                                 interpret this



                                                                 result as



                                                                 normal/abnormal

.

 

             RBC (test code =              See_Comment                [Automated



             789-8)                                              message] The sy

stem



                                                                 which generated



                                                                 this result



                                                                 transmitted



                                                                 reference range

:



                                                                 4.26 - 5.52



                                                                 10*6/?L. The



                                                                 reference range

 was



                                                                 not used to



                                                                 interpret this



                                                                 result as



                                                                 normal/abnormal

.

 

             HGB (test code = 15.6 g/dL    12.2-16.4                 



             718-7)                                              

 

             HCT (test code = 46.3 %       38.4-49.3                 



             4544-3)                                             

 

             MCV (test code = 85.4 fL      81.7-95.6                 



             787-2)                                              

 

             MCH (test code = 28.8 pg      26.1-32.7                 



             785-6)                                              

 

             MCHC (test code = 33.7 g/dL    31.2-35.0                 



             786-4)                                              

 

             RDW-SD (test code = 41.7 fL      38.5-51.6                 



             07476-3)                                            

 

             RDW-CV (test code = 13.4 %       12.1-15.4                 



             788-0)                                              

 

             PLT (test code =              See_Comment  H             [Automated



             777-3)                                              message] The sy

stem



                                                                 which generated



                                                                 this result



                                                                 transmitted



                                                                 reference range

:



                                                                 150 - 328 10*3/

?L.



                                                                 The reference r

zhen



                                                                 was not used to



                                                                 interpret this



                                                                 result as



                                                                 normal/abnormal

.

 

             MPV (test code = 10.3 fL      9.8-13.0                  



             73949-8)                                            

 

             NRBC/100 WBC (test              See_Comment                [Automat

ed



             code = [id number])                                        message] 

The system



                                                                 which generated



                                                                 this result



                                                                 transmitted



                                                                 reference range

:



                                                                 0.0 - 10.0 /100



                                                                 WBCs. The refer

ence



                                                                 range was not u

sed



                                                                 to interpret th

is



                                                                 result as



                                                                 normal/abnormal

.

 

             NRBC x10^3 (test code <0.01        See_Comment                [Auto

mated



             = [id number])                                        message] The s

[employer]



                                                                 which generated



                                                                 this result



                                                                 transmitted



                                                                 reference range

:



                                                                 10*3/?L. The



                                                                 reference range

 was



                                                                 not used to



                                                                 interpret this



                                                                 result as



                                                                 normal/abnormal

.

 

             GRAN MAT (NEUT) % 71.2 %                                 



             (test code = 770-8)                                        

 

             IMM GRAN % (test code 1.30 %                                 



             = 2135136243)                                        

 

             LYMPH % (test code = 18.2 %                                 



             736-9)                                              

 

             MONO % (test code = 7.1 %                                  



             5905-5)                                             

 

             EOS % (test code = 1.9 %                                  



             713-8)                                              

 

             BASO % (test code = 0.3 %                                  



             706-2)                                              

 

             GRAN MAT x10^3(ANC) 8.37 10*3/uL 1.99-6.95    H            



             (test code =                                        



             9566322352)                                         

 

             IMM GRAN x10^3 (test 0.15 10*3/uL 0.00-0.06    H            



             code = 0079280435)                                        

 

             LYMPH x10^3 (test code 2.14 10*3/uL 1.09-3.23                 



             = 731-0)                                            

 

             MONO x10^3 (test code 0.84 10*3/uL 0.36-1.02                 



             = 742-7)                                            

 

             EOS x10^3 (test code = 0.22 10*3/uL 0.06-0.53                 



             711-2)                                              

 

             BASO x10^3 (test code 0.04 10*3/uL 0.01-0.09                 



             = 704-7)                                            

 

             Lab Interpretation Abnormal                               



             (test code = 24584-1)                                        



[facility]. METABOLIC PANEL [id number] 
11:03:21





             Test Item    Value        Reference Range Interpretation Comments

 

             NA (test code = 133 mmol/L   135-145      L            



             6097058050)                                         

 

             K (test code = 4.3 mmol/L   3.5-5.0                   



             6496318274)                                         

 

             CL (test code = 99 mmol/L                        



             2057588209)                                         

 

             CO2 TOTAL (test code = 20 mmol/L    23-31        L            



             3101319010)                                         

 

             AGAP (test code =              2-16                      



             3399803766)                                         

 

             BUN (test code = 11 mg/dL     7-23                      



             0421535510)                                         

 

             GLUCOSE (test code = 357 mg/dL           H            



             9944182198)                                         

 

             CREATININE (test code = 0.52 mg/dL   0.60-1.25    L            



             6519382733)                                         

 

             TOTAL BILI (test code = 0.7 mg/dL    0.1-1.1                   



             0488130740)                                         

 

             CALCIUM (test code = 9.6 mg/dL    8.6-10.6                  



             8603263094)                                         

 

             T PROTEIN (test code = 7.5 g/dL     6.3-8.2                   



             9533622304)                                         

 

             ALBUMIN (test code = 4.2 g/dL     3.5-5.0                   



             7074787881)                                         

 

             ALK PHOS (test code = 185 U/L             H            



             2044527684)                                         

 

             ALTv (test code = 78 U/L       5-50         H            



             1742-6)                                             

 

             AST(SGOT) (test code = 18 U/L       13-40                     



             6310121955)                                         

 

             eGFR (test code =              mL/min/1.73m2              



             4827444982)                                         

 

             MAL (test code = MAL) Association of                           



                          Glomerular Filtration                           



                          Rate (GFR) and Staging                           



                          of Kidney Disease*                           



                          +---------------------                           



                          --+-------------------                           



                          --+-------------------                           



                          ------+| GFR                           



                          (mL/min/1.73 m2) ?|                           



                          With Kidney Damage ?|                           



                          ?Without Kidney                           



                          Damage+---------------                           



                          --------+-------------                           



                          --------+-------------                           



                          ------------+| ?>90 ?                           



                          ? ? ? ? ? ? ? ?|                           



                          ?Stage one ? ? ? ? ?|                           



                          ? Normal ? ? ? ? ? ? ?                           



                          ?+--------------------                           



                          ---+------------------                           



                          ---+------------------                           



                          -------+| ?60-89 ? ? ?                           



                          ? ? ? ? ?| ?Stage two                           



                          ? ? ? ? ?| ? Decreased                           



                          GFR ? ? ? ?                            



                          +---------------------                           



                          --+-------------------                           



                          --+-------------------                           



                          ------+| ?30-59 ? ? ?                           



                          ? ? ? ? ?| ?Stage                           



                          three ? ? ? ?| ? Stage                           



                          three ? ? ? ? ?                           



                          +---------------------                           



                          --+-------------------                           



                          --+-------------------                           



                          ------+| ?15-29 ? ? ?                           



                          ? ? ? ? ?| ?Stage four                           



                          ? ? ? ? | ? Stage four                           



                          ? ? ? ? ?                              



                          ?+--------------------                           



                          ---+------------------                           



                          ---+------------------                           



                          -------+| ?<15 (or                           



                          dialysis) ? ?| ?Stage                           



                          five ? ? ? ? | ? Stage                           



                          five ? ? ? ? ?                           



                          ?+--------------------                           



                          ---+------------------                           



                          ---+------------------                           



                          -------+ *Each stage                           



                          assumes the associated                           



                          GFR level has been in                           



                          effect for at least                           



                          three months. ?Stages                           



                          1 to 5, with or                           



                          without kidney                           



                          disease, indicate                           



                          chronic kidney                           



                          disease. Notes:                           



                          Determination of                           



                          stages one and two                           



                          (with eGFR                             



                          >59mL/min/1.73 m2)                           



                          requires estimation of                           



                          kidney damage for at                           



                          least three months as                           



                          defined by structural                           



                          or functional                           



                          abnormalities of the                           



                          kidney, manifested by                           



                          either:Pathological                           



                          abnormalities or                           



                          Markers of kidney                           



                          damage (including                           



                          abnormalities in the                           



                          composition of the                           



                          blood or urine or                           



                          abnormalities in                           



                          imaging tests).                           

 

             Lab Interpretation Abnormal                               



             (test code = 22915-5)                                        



[facility]





             Test Item    Value        Reference Range Interpretation Comments

 

             LIPASE (test code = 8881467968) 63 U/L       0-220                 

    

 

             Lab Interpretation (test code = Normal                             

    



             47509-6)                                            



[facility] GLUCOSE (AUTOMATED)2022 22:54:04





             Test Item    Value        Reference Range Interpretation Comments

 

             POCT GLU (test code = 6515710428) 361 mg/dL           H      

      

 

             Lab Interpretation (test code = Abnormal                           

    



             47062-7)                                            



[facility] GLUCOSE (AUTOMATED)2022 12:19:07





             Test Item    Value        Reference Range Interpretation Comments

 

             POCT GLU (test code = 2044348116) 390 mg/dL           H      

      

 

             Lab Interpretation (test code = Abnormal                           

    



             68736-5)                                            



[facility] GLUCOSE (AUTOMATED)2022 04:48:35





             Test Item    Value        Reference Range Interpretation Comments

 

             POCT GLU (test code = 8635532765) 412 mg/dL           H      

      

 

             Lab Interpretation (test code = Abnormal                           

    



             70410-8)                                            



[facility] GLUCOSE (AUTOMATED)2022 01:44:33





             Test Item    Value        Reference Range Interpretation Comments

 

             POCT GLU (test code = 7831991070) 376 mg/dL           H      

      

 

             Lab Interpretation (test code = Abnormal                           

    



             87872-8)                                            



[facility] GLUCOSE (AUTOMATED)2022 21:51:37





             Test Item    Value        Reference Range Interpretation Comments

 

             POCT GLU (test code = 5171408425) 267 mg/dL           H      

      

 

             Lab Interpretation (test code = Abnormal                           

    



             82605-8)                                            



[facility] GLUCOSE (AUTOMATED)2022 17:05:21





             Test Item    Value        Reference Range Interpretation Comments

 

             POCT GLU (test code = 5980815098) 377 mg/dL           H      

      

 

             Lab Interpretation (test code = Abnormal                           

    



             40089-0)                                            



[facility] GLUCOSE (AUTOMATED)2022 13:10:54





             Test Item    Value        Reference Range Interpretation Comments

 

             POCT GLU (test code = 0733959304) 495 mg/dL           HH     

      

 

             Lab Interpretation (test code = Abnormal                           

    



             07785-6)                                            



[facility] GLUCOSE (AUTOMATED)2022 08:34:04





             Test Item    Value        Reference Range Interpretation Comments

 

             POCT GLU (test code = 8754735060) 427 mg/dL           H      

      

 

             Lab Interpretation (test code = Abnormal                           

    



             27233-1)                                            



[facility] GLUCOSE (AUTOMATED)2022 05:46:02





             Test Item    Value        Reference Range Interpretation Comments

 

             POCT GLU (test code = 2123041307) 451 mg/dL           HH     

      

 

             Lab Interpretation (test code = Abnormal                           

    



             98601-7)                                            



[facility] GLUCOSE (AUTOMATED)2022 02:44:38





             Test Item    Value        Reference Range Interpretation Comments

 

             POCT GLU (test code = 9709739664) 429 mg/dL           H      

      

 

             Lab Interpretation (test code = Abnormal                           

    



             57201-0)                                            



[facility] GLUCOSE (AUTOMATED)2022 22:38:24





             Test Item    Value        Reference Range Interpretation Comments

 

             POCT GLU (test code = 3221969073) 404 mg/dL           H      

      

 

             Lab Interpretation (test code = Abnormal                           

    



             53789-3)                                            



[facility] GLUCOSE (AUTOMATED)2022 18:05:20





             Test Item    Value        Reference Range Interpretation Comments

 

             POCT GLU (test code = 9872406237) 423 mg/dL           H      

      

 

             Lab Interpretation (test code = Abnormal                           

    



             69465-4)                                            



[facility] METABOLIC PANEL (NA, K, CL, CO2, 
GLUCOSE, BUN, CREATININE, CA)2022 14:56:25





             Test Item    Value        Reference Range Interpretation Comments

 

             NA (test code = 135 mmol/L   135-145                   



             2936629267)                                         

 

             K (test code = 4.0 mmol/L   3.5-5.0                   



             1552396464)                                         

 

             CL (test code = 102 mmol/L                       



             1498797207)                                         

 

             CO2 TOTAL (test code = 22 mmol/L    23-31        L            



             1043704479)                                         

 

             AGAP (test code =              2-16                      



             0249591001)                                         

 

             BUN (test code = 13 mg/dL     7-23                      



             9715356152)                                         

 

             GLUCOSE (test code = 547 mg/dL           HH           



             8454386816)                                         

 

             CREATININE (test code = 0.65 mg/dL   0.60-1.25                 



             6179318185)                                         

 

             CALCIUM (test code = 8.6 mg/dL    8.6-10.6                  



             9789531897)                                         

 

             eGFR (test code =              mL/min/1.73m2              



             8522182799)                                         

 

             MAL (test code = MAL) Association of                           



                          Glomerular Filtration                           



                          Rate (GFR) and Staging                           



                          of Kidney Disease*                           



                          +---------------------                           



                          --+-------------------                           



                          --+-------------------                           



                          ------+| GFR                           



                          (mL/min/1.73 m2) ?|                           



                          With Kidney Damage ?|                           



                          ?Without Kidney                           



                          Damage+---------------                           



                          --------+-------------                           



                          --------+-------------                           



                          ------------+| ?>90 ?                           



                          ? ? ? ? ? ? ? ?|                           



                          ?Stage one ? ? ? ? ?|                           



                          ? Normal ? ? ? ? ? ? ?                           



                          ?+--------------------                           



                          ---+------------------                           



                          ---+------------------                           



                          -------+| ?60-89 ? ? ?                           



                          ? ? ? ? ?| ?Stage two                           



                          ? ? ? ? ?| ? Decreased                           



                          GFR ? ? ? ?                            



                          +---------------------                           



                          --+-------------------                           



                          --+-------------------                           



                          ------+| ?30-59 ? ? ?                           



                          ? ? ? ? ?| ?Stage                           



                          three ? ? ? ?| ? Stage                           



                          three ? ? ? ? ?                           



                          +---------------------                           



                          --+-------------------                           



                          --+-------------------                           



                          ------+| ?15-29 ? ? ?                           



                          ? ? ? ? ?| ?Stage four                           



                          ? ? ? ? | ? Stage four                           



                          ? ? ? ? ?                              



                          ?+--------------------                           



                          ---+------------------                           



                          ---+------------------                           



                          -------+| ?<15 (or                           



                          dialysis) ? ?| ?Stage                           



                          five ? ? ? ? | ? Stage                           



                          five ? ? ? ? ?                           



                          ?+--------------------                           



                          ---+------------------                           



                          ---+------------------                           



                          -------+ *Each stage                           



                          assumes the associated                           



                          GFR level has been in                           



                          effect for at least                           



                          three months. ?Stages                           



                          1 to 5, with or                           



                          without kidney                           



                          disease, indicate                           



                          chronic kidney                           



                          disease. Notes:                           



                          Determination of                           



                          stages one and two                           



                          (with eGFR                             



                          >59mL/min/1.73 m2)                           



                          requires estimation of                           



                          kidney damage for at                           



                          least three months as                           



                          defined by structural                           



                          or functional                           



                          abnormalities of the                           



                          kidney, manifested by                           



                          either:Pathological                           



                          abnormalities or                           



                          Markers of kidney                           



                          damage (including                           



                          abnormalities in the                           



                          composition of the                           



                          blood or urine or                           



                          abnormalities in                           



                          imaging tests).                           

 

             Lab Interpretation Abnormal                               



             (test code = 45890-2)                                        



[facility] GLUCOSE (AUTOMATED)2022 13:36:27





             Test Item    Value        Reference Range Interpretation Comments

 

             POCT GLU (test code = 6107955409) 526 mg/dL           HH     

      

 

             Lab Interpretation (test code = Abnormal                           

    



             64554-9)                                            



[facility] GLUCOSE (AUTOMATED)2022 12:52:44





             Test Item    Value        Reference Range Interpretation Comments

 

             POCT GLU (test code = 9606519269) >600                HH     

      

 

             Lab Interpretation (test code = Abnormal                           

    



             14739-0)                                            



[facility] GLUCOSE (AUTOMATED)2022 12:52:44





             Test Item    Value        Reference Range Interpretation Comments

 

             POCT GLU (test code = 3177721546) >600                HH     

      

 

             Lab Interpretation (test code = Abnormal                           

    



             53899-3)                                            



[facility] GLUCOSE (AUTOMATED)2022 11:02:02





             Test Item    Value        Reference Range Interpretation Comments

 

             POCT GLU (test code = 2593359889) 521 mg/dL           HH     

      

 

             Lab Interpretation (test code = Abnormal                           

    



             46299-3)                                            



[facility] GLUCOSE (AUTOMATED)2022 07:22:18





             Test Item    Value        Reference Range Interpretation Comments

 

             POCT GLU (test code = 7846334363) 534 mg/dL           HH     

      

 

             Lab Interpretation (test code = Abnormal                           

    



             53828-4)                                            



[facility] METABOLIC PANEL (NA, K, CL, CO2, 
GLUCOSE, BUN, CREATININE, CA)2022 06:34:51





             Test Item    Value        Reference Range Interpretation Comments

 

             NA (test code = 133 mmol/L   135-145      L            



             8994631662)                                         

 

             K (test code = 4.9 mmol/L   3.5-5.0                   Slight



             9623354671)                                         hemolysis

 

             CL (test code = 96 mmol/L           L            



             7367167104)                                         

 

             CO2 TOTAL (test code 21 mmol/L    23-31        L            



             = 9286187671)                                        

 

             AGAP (test code =              2-16                      



             2402822260)                                         

 

             BUN (test code = 12 mg/dL     7-23                      Slight



             8230761141)                                         hemolysis

 

             GLUCOSE (test code = 639 mg/dL           HH           



             6863768498)                                         

 

             CREATININE (test code 0.46 mg/dL   0.60-1.25    L            



             = 3890015401)                                        

 

             CALCIUM (test code = 9.7 mg/dL    8.6-10.6                  



             0867780758)                                         

 

             eGFR (test code =              mL/min/1.73m2              



             7941906091)                                         

 

             MAL (test code = MAL) Association of                           



                          Glomerular                             



                          Filtration Rate                           



                          (GFR) and Staging                           



                          of Kidney Disease*                           



                          +------------------                           



                          -----+-------------                           



                          --------+----------                           



                          ---------------+|                           



                          GFR (mL/min/1.73                           



                          m2) ?| With Kidney                           



                          Damage ?| ?Without                           



                          Kidney                                 



                          Damage+------------                           



                          -----------+-------                           



                          --------------+----                           



                          -------------------                           



                          --+| ?>90 ? ? ? ? ?                           



                          ? ? ? ?| ?Stage one                           



                          ? ? ? ? ?| ? Normal                           



                          ? ? ? ? ? ? ?                           



                          ?+-----------------                           



                          ------+------------                           



                          ---------+---------                           



                          ----------------+|                           



                          ?60-89 ? ? ? ? ? ?                           



                          ? ?| ?Stage two ? ?                           



                          ? ? ?| ? Decreased                           



                          GFR ? ? ? ?                            



                          +------------------                           



                          -----+-------------                           



                          --------+----------                           



                          ---------------+|                           



                          ?30-59 ? ? ? ? ? ?                           



                          ? ?| ?Stage three ?                           



                          ? ? ?| ? Stage                           



                          three ? ? ? ? ?                           



                          +------------------                           



                          -----+-------------                           



                          --------+----------                           



                          ---------------+|                           



                          ?15-29 ? ? ? ? ? ?                           



                          ? ?| ?Stage four ?                           



                          ? ? ? | ? Stage                           



                          four ? ? ? ? ?                           



                          ?+-----------------                           



                          ------+------------                           



                          ---------+---------                           



                          ----------------+|                           



                          ?<15 (or dialysis)                           



                          ? ?| ?Stage five ?                           



                          ? ? ? | ? Stage                           



                          five ? ? ? ? ?                           



                          ?+-----------------                           



                          ------+------------                           



                          ---------+---------                           



                          ----------------+                           



                          *Each stage assumes                           



                          the associated GFR                           



                          level has been in                           



                          effect for at least                           



                          three months.                           



                          ?Stages 1 to 5,                           



                          with or without                           



                          kidney disease,                           



                          indicate chronic                           



                          kidney disease.                           



                          Notes:                                 



                          Determination of                           



                          stages one and two                           



                          (with eGFR                             



                          >59mL/min/1.73 m2)                           



                          requires estimation                           



                          of kidney damage                           



                          for at least three                           



                          months as defined                           



                          by structural or                           



                          functional                             



                          abnormalities of                           



                          the kidney,                            



                          manifested by                           



                          either:Pathological                           



                          abnormalities or                           



                          Markers of kidney                           



                          damage (including                           



                          abnormalities in                           



                          the composition of                           



                          the blood or urine                           



                          or abnormalities in                           



                          imaging tests).                           

 

             Lab Interpretation Abnormal                               



             (test code = 53250-0)                                        



[facility]. METABOLIC PANEL ([id number])2022 
02:59:57





             Test Item    Value        Reference Range Interpretation Comments

 

             NA (test code = 126 mmol/L   135-145      L            



             3387497853)                                         

 

             K (test code = 5.2 mmol/L   3.5-5.0      H            



             1663357208)                                         

 

             CL (test code = 89 mmol/L           L            



             7386272613)                                         

 

             CO2 TOTAL (test code = 20 mmol/L    23-31        L            



             7301320085)                                         

 

             AGAP (test code =              2-16         H            



             0614702858)                                         

 

             BUN (test code = 12 mg/dL     7-23                      



             4656004541)                                         

 

             GLUCOSE (test code = 856 mg/dL           HH           



             1161786509)                                         

 

             CREATININE (test code = 0.49 mg/dL   0.60-1.25    L            



             5931493912)                                         

 

             TOTAL BILI (test code = 0.7 mg/dL    0.1-1.1                   



             5057533799)                                         

 

             CALCIUM (test code = 10.2 mg/dL   8.6-10.6                  



             2076375964)                                         

 

             T PROTEIN (test code = 8.2 g/dL     6.3-8.2                   



             1505044812)                                         

 

             ALBUMIN (test code = 4.5 g/dL     3.5-5.0                   



             9105273362)                                         

 

             ALK PHOS (test code = 186 U/L             H            



             7924206005)                                         

 

             ALTv (test code = 27 U/L       5-50                      



             1742-6)                                             

 

             AST(SGOT) (test code = 18 U/L       13-40                     



             2515921345)                                         

 

             eGFR (test code =              mL/min/1.73m2              



             7297518859)                                         

 

             MAL (test code = MAL) Association of                           



                          Glomerular Filtration                           



                          Rate (GFR) and Staging                           



                          of Kidney Disease*                           



                          +---------------------                           



                          --+-------------------                           



                          --+-------------------                           



                          ------+| GFR                           



                          (mL/min/1.73 m2) ?|                           



                          With Kidney Damage ?|                           



                          ?Without Kidney                           



                          Damage+---------------                           



                          --------+-------------                           



                          --------+-------------                           



                          ------------+| ?>90 ?                           



                          ? ? ? ? ? ? ? ?|                           



                          ?Stage one ? ? ? ? ?|                           



                          ? Normal ? ? ? ? ? ? ?                           



                          ?+--------------------                           



                          ---+------------------                           



                          ---+------------------                           



                          -------+| ?60-89 ? ? ?                           



                          ? ? ? ? ?| ?Stage two                           



                          ? ? ? ? ?| ? Decreased                           



                          GFR ? ? ? ?                            



                          +---------------------                           



                          --+-------------------                           



                          --+-------------------                           



                          ------+| ?30-59 ? ? ?                           



                          ? ? ? ? ?| ?Stage                           



                          three ? ? ? ?| ? Stage                           



                          three ? ? ? ? ?                           



                          +---------------------                           



                          --+-------------------                           



                          --+-------------------                           



                          ------+| ?15-29 ? ? ?                           



                          ? ? ? ? ?| ?Stage four                           



                          ? ? ? ? | ? Stage four                           



                          ? ? ? ? ?                              



                          ?+--------------------                           



                          ---+------------------                           



                          ---+------------------                           



                          -------+| ?<15 (or                           



                          dialysis) ? ?| ?Stage                           



                          five ? ? ? ? | ? Stage                           



                          five ? ? ? ? ?                           



                          ?+--------------------                           



                          ---+------------------                           



                          ---+------------------                           



                          -------+ *Each stage                           



                          assumes the associated                           



                          GFR level has been in                           



                          effect for at least                           



                          three months. ?Stages                           



                          1 to 5, with or                           



                          without kidney                           



                          disease, indicate                           



                          chronic kidney                           



                          disease. Notes:                           



                          Determination of                           



                          stages one and two                           



                          (with eGFR                             



                          >59mL/min/1.73 m2)                           



                          requires estimation of                           



                          kidney damage for at                           



                          least three months as                           



                          defined by structural                           



                          or functional                           



                          abnormalities of the                           



                          kidney, manifested by                           



                          either:Pathological                           



                          abnormalities or                           



                          Markers of kidney                           



                          damage (including                           



                          abnormalities in the                           



                          composition of the                           



                          blood or urine or                           



                          abnormalities in                           



                          imaging tests).                           

 

             Lab Interpretation Abnormal                               



             (test code = 18976-4)                                        



[facility]





             Test Item    Value        Reference Range Interpretation Comments

 

             LIPASE (test code = 7897267339) 146 U/L      0-220                 

    

 

             Lab Interpretation (test code = Normal                             

    



             03509-8)                                            



[facility] WITH [id number] 02:34:07





             Test Item    Value        Reference Range Interpretation Comments

 

             WBC (test code =              See_Comment                [Automated



             3621-2)                                             message] The sy

stem



                                                                 which generated



                                                                 this result



                                                                 transmitted



                                                                 reference range

:



                                                                 4.20 - 10.70



                                                                 10*3/?L. The



                                                                 reference range

 was



                                                                 not used to



                                                                 interpret this



                                                                 result as



                                                                 normal/abnormal

.

 

             RBC (test code =              See_Comment                [Automated



             789-8)                                              message] The sy

stem



                                                                 which generated



                                                                 this result



                                                                 transmitted



                                                                 reference range

:



                                                                 4.26 - 5.52



                                                                 10*6/?L. The



                                                                 reference range

 was



                                                                 not used to



                                                                 interpret this



                                                                 result as



                                                                 normal/abnormal

.

 

             HGB (test code = 16.1 g/dL    12.2-16.4                 



             718-7)                                              

 

             HCT (test code = 47.5 %       38.4-49.3                 



             4544-3)                                             

 

             MCV (test code = 86.2 fL      81.7-95.6                 



             787-2)                                              

 

             MCH (test code = 29.2 pg      26.1-32.7                 



             785-6)                                              

 

             MCHC (test code = 33.9 g/dL    31.2-35.0                 



             786-4)                                              

 

             RDW-SD (test code = 42.0 fL      38.5-51.6                 



             11057-8)                                            

 

             RDW-CV (test code = 13.5 %       12.1-15.4                 



             788-0)                                              

 

             PLT (test code =              See_Comment  H             [Automated



             777-3)                                              message] The sy

stem



                                                                 which generated



                                                                 this result



                                                                 transmitted



                                                                 reference range

:



                                                                 150 - 328 10*3/

?L.



                                                                 The reference r

zhen



                                                                 was not used to



                                                                 interpret this



                                                                 result as



                                                                 normal/abnormal

.

 

             MPV (test code = 10.5 fL      9.8-13.0                  



             65460-5)                                            

 

             NRBC/100 WBC (test              See_Comment                [Automat

ed



             code = 1958784565)                                        message] 

The system



                                                                 which generated



                                                                 this result



                                                                 transmitted



                                                                 reference range

:



                                                                 0.0 - 10.0 /100



                                                                 WBCs. The refer

ence



                                                                 range was not u

sed



                                                                 to interpret th

is



                                                                 result as



                                                                 normal/abnormal

.

 

             NRBC x10^3 (test code <0.01        See_Comment                [Auto

mated



             = 2467555790)                                        message] The s

ystem



                                                                 which generated



                                                                 this result



                                                                 transmitted



                                                                 reference range

:



                                                                 10*3/?L. The



                                                                 reference range

 was



                                                                 not used to



                                                                 interpret this



                                                                 result as



                                                                 normal/abnormal

.

 

             GRAN MAT (NEUT) % 67.5 %                                 



             (test code = 770-8)                                        

 

             IMM GRAN % (test code 0.70 %                                 



             = 9267074948)                                        

 

             LYMPH % (test code = 21.7 %                                 



             736-9)                                              

 

             MONO % (test code = 8.4 %                                  



             5905-5)                                             

 

             EOS % (test code = 1.3 %                                  



             713-8)                                              

 

             BASO % (test code = 0.4 %                                  



             706-2)                                              

 

             GRAN MAT x10^3(ANC) 6.61 10*3/uL 1.99-6.95                 



             (test code =                                        



             9396741371)                                         

 

             IMM GRAN x10^3 (test 0.07 10*3/uL 0.00-0.06    H            



             code = 2896159960)                                        

 

             LYMPH x10^3 (test code 2.12 10*3/uL 1.09-3.23                 



             = 731-0)                                            

 

             MONO x10^3 (test code 0.82 10*3/uL 0.36-1.02                 



             = 742-7)                                            

 

             EOS x10^3 (test code = 0.13 10*3/uL 0.06-0.53                 



             711-2)                                              

 

             BASO x10^3 (test code 0.04 10*3/uL 0.01-0.09                 



             = 704-7)                                            

 

             Lab Interpretation Abnormal                               



             (test code = 45923-0)                                        



[facility]ALB,T.PRO,BILI
T,BU/BC,ALT,AST,ALK PHOS)2022 13:57:09





             Test Item    Value        Reference Range Interpretation Comments

 

             TOTAL BILI (test code = 2229055601) 0.7 mg/dL    0.1-1.1           

        

 

             BILI UNCON (test code = 1764429551) 0.3 mg/dL    0.1-1.1           

        

 

             BILI CONJ (test code = 3240003281) 0.0 mg/dL    0.0-0.3            

       

 

             T PROTEIN (test code = 4979791866) 7.5 g/dL     6.3-8.2            

       

 

             ALBUMIN (test code = 7763371405) 3.8 g/dL     3.5-5.0              

     

 

             ALK PHOS (test code = 2084256811) 161 U/L             H      

      

 

             ALTv (test code = 1742-6) 44 U/L       5-50                      

 

             AST(SGOT) (test code = 4507073753) 47 U/L       13-40        H     

       

 

             Lab Interpretation (test code = Abnormal                           

    



             48063-3)                                            



[facility], K, CL, CO2, 
GLUCOSE, BUN, CREATININE, CA)2022 10:48:59





             Test Item    Value        Reference Range Interpretation Comments

 

             NA (test code = 136 mmol/L   135-145                   



             [id number])                                         

 

             K (test code = 3.8 mmol/L   3.5-5.0                   



             5022700326)                                         

 

             CL (test code = 101 mmol/L                       



             6670658319)                                         

 

             CO2 TOTAL (test code = 22 mmol/L    23-31        L            



             1710288639)                                         

 

             AGAP (test code =              2-16                      



             9009866644)                                         

 

             BUN (test code = 16 mg/dL     7-23                      



             2117896552)                                         

 

             GLUCOSE (test code = 358 mg/dL           H            



             3701326525)                                         

 

             CREATININE (test code = 0.63 mg/dL   0.60-1.25                 



             4543293309)                                         

 

             CALCIUM (test code = 9.2 mg/dL    8.6-10.6                  



             1318213266)                                         

 

             eGFR (test code =              mL/min/1.73m2              



             3764105339)                                         

 

             MAL (test code = MAL) Association of                           



                          Glomerular Filtration                           



                          Rate (GFR) and Staging                           



                          of Kidney Disease*                           



                          +---------------------                           



                          --+-------------------                           



                          --+-------------------                           



                          ------+| GFR                           



                          (mL/min/1.73 m2) ?|                           



                          With Kidney Damage ?|                           



                          ?Without Kidney                           



                          Damage+---------------                           



                          --------+-------------                           



                          --------+-------------                           



                          ------------+| ?>90 ?                           



                          ? ? ? ? ? ? ? ?|                           



                          ?Stage one ? ? ? ? ?|                           



                          ? Normal ? ? ? ? ? ? ?                           



                          ?+--------------------                           



                          ---+------------------                           



                          ---+------------------                           



                          -------+| ?60-89 ? ? ?                           



                          ? ? ? ? ?| ?Stage two                           



                          ? ? ? ? ?| ? Decreased                           



                          GFR ? ? ? ?                            



                          +---------------------                           



                          --+-------------------                           



                          --+-------------------                           



                          ------+| ?30-59 ? ? ?                           



                          ? ? ? ? ?| ?Stage                           



                          three ? ? ? ?| ? Stage                           



                          three ? ? ? ? ?                           



                          +---------------------                           



                          --+-------------------                           



                          --+-------------------                           



                          ------+| ?15-29 ? ? ?                           



                          ? ? ? ? ?| ?Stage four                           



                          ? ? ? ? | ? Stage four                           



                          ? ? ? ? ?                              



                          ?+--------------------                           



                          ---+------------------                           



                          ---+------------------                           



                          -------+| ?<15 (or                           



                          dialysis) ? ?| ?Stage                           



                          five ? ? ? ? | ? Stage                           



                          five ? ? ? ? ?                           



                          ?+--------------------                           



                          ---+------------------                           



                          ---+------------------                           



                          -------+ *Each stage                           



                          assumes the associated                           



                          GFR level has been in                           



                          effect for at least                           



                          three months. ?Stages                           



                          1 to 5, with or                           



                          without kidney                           



                          disease, indicate                           



                          chronic kidney                           



                          disease. Notes:                           



                          Determination of                           



                          stages one and two                           



                          (with eGFR                             



                          >59mL/min/1.73 m2)                           



                          requires estimation of                           



                          kidney damage for at                           



                          least three months as                           



                          defined by structural                           



                          or functional                           



                          abnormalities of the                           



                          kidney, manifested by                           



                          either:Pathological                           



                          abnormalities or                           



                          Markers of kidney                           



                          damage (including                           



                          abnormalities in the                           



                          composition of the                           



                          blood or urine or                           



                          abnormalities in                           



                          imaging tests).                           

 

             Lab Interpretation Abnormal                               



             (test code = 86240-7)                                        



[facility] WITH [id number] 10:01:35





             Test Item    Value        Reference Range Interpretation Comments

 

             WBC (test code =              See_Comment                [Automated



             6690-2)                                             message] The sy

stem



                                                                 which generated



                                                                 this result



                                                                 transmitted



                                                                 reference range

:



                                                                 4.20 - 10.70



                                                                 10*3/?L. The



                                                                 reference range

 was



                                                                 not used to



                                                                 interpret this



                                                                 result as



                                                                 normal/abnormal

.

 

             RBC (test code =              See_Comment                [Automated



             789-8)                                              message] The sy

stem



                                                                 which generated



                                                                 this result



                                                                 transmitted



                                                                 reference range

:



                                                                 4.26 - 5.52



                                                                 10*6/?L. The



                                                                 reference range

 was



                                                                 not used to



                                                                 interpret this



                                                                 result as



                                                                 normal/abnormal

.

 

             HGB (test code = 14.9 g/dL    12.2-16.4                 



             718-7)                                              

 

             HCT (test code = 43.2 %       38.4-49.3                 



             4544-3)                                             

 

             MCV (test code = 86.1 fL      81.7-95.6                 



             787-2)                                              

 

             MCH (test code = 29.7 pg      26.1-32.7                 



             785-6)                                              

 

             MCHC (test code = 34.5 g/dL    31.2-35.0                 



             786-4)                                              

 

             RDW-SD (test code = 41.6 fL      38.5-51.6                 



             32784-1)                                            

 

             RDW-CV (test code = 13.4 %       12.1-15.4                 



             788-0)                                              

 

             PLT (test code =              See_Comment  H             [Automated



             777-3)                                              message] The sy

stem



                                                                 which generated



                                                                 this result



                                                                 transmitted



                                                                 reference range

:



                                                                 150 - 328 10*3/

?L.



                                                                 The reference r

zhen



                                                                 was not used to



                                                                 interpret this



                                                                 result as



                                                                 normal/abnormal

.

 

             MPV (test code = 11.0 fL      9.8-13.0                  



             49821-7)                                            

 

             NRBC/100 WBC (test              See_Comment                [Automat

ed



             code = 0973667639)                                        message] 

The system



                                                                 which generated



                                                                 this result



                                                                 transmitted



                                                                 reference range

:



                                                                 0.0 - 10.0 /100



                                                                 WBCs. The refer

ence



                                                                 range was not u

sed



                                                                 to interpret th

is



                                                                 result as



                                                                 normal/abnormal

.

 

             NRBC x10^3 (test code <0.01        See_Comment                [Auto

mated



             = 9328777476)                                        message] The s

ystem



                                                                 which generated



                                                                 this result



                                                                 transmitted



                                                                 reference range

:



                                                                 10*3/?L. The



                                                                 reference range

 was



                                                                 not used to



                                                                 interpret this



                                                                 result as



                                                                 normal/abnormal

.

 

             GRAN MAT (NEUT) % 62.6 %                                 



             (test code = 770-8)                                        

 

             IMM GRAN % (test code 0.40 %                                 



             = 1087272330)                                        

 

             LYMPH % (test code = 23.2 %                                 



             736-9)                                              

 

             MONO % (test code = 9.6 %                                  



             5905-5)                                             

 

             EOS % (test code = 3.8 %                                  



             713-8)                                              

 

             BASO % (test code = 0.4 %                                  



             706-2)                                              

 

             GRAN MAT x10^3(ANC) 4.76 10*3/uL 1.99-6.95                 



             (test code =                                        



             9007055107)                                         

 

             IMM GRAN x10^3 (test 0.03 10*3/uL 0.00-0.06                 



             code = 0706558809)                                        

 

             LYMPH x10^3 (test code 1.76 10*3/uL 1.09-3.23                 



             = 731-0)                                            

 

             MONO x10^3 (test code 0.73 10*3/uL 0.36-1.02                 



             = 742-7)                                            

 

             EOS x10^3 (test code = 0.29 10*3/uL 0.06-0.53                 



             711-2)                                              

 

             BASO x10^3 (test code 0.03 10*3/uL 0.01-0.09                 



             = 704-7)                                            

 

             Lab Interpretation Abnormal                               



             (test code = 03595-7)                                        



[facility]. METABOLIC PANEL (06682)2022 
04:36:59





             Test Item    Value        Reference Range Interpretation Comments

 

             NA (test code = 138 mmol/L   135-145                   



             7195870227)                                         

 

             K (test code = 5.4 mmol/L   3.5-5.0      H            



             7736470228)                                         

 

             CL (test code = 98 mmol/L                        



             3379866018)                                         

 

             CO2 TOTAL (test code = 17 mmol/L    23-31        L            



             0409033963)                                         

 

             AGAP (test code =              2-16         H            



             6195828074)                                         

 

             BUN (test code = 19 mg/dL     7-23                      



             0878400716)                                         

 

             GLUCOSE (test code = 469 mg/dL           HH           



             2561245582)                                         

 

             CREATININE (test code = 0.74 mg/dL   0.60-1.25                 



             0852463449)                                         

 

             TOTAL BILI (test code = 1.0 mg/dL    0.1-1.1                   



             2901518711)                                         

 

             CALCIUM (test code = 10.1 mg/dL   8.6-10.6                  



             5101371559)                                         

 

             T PROTEIN (test code = 8.2 g/dL     6.3-8.2                   



             6676701371)                                         

 

             ALBUMIN (test code = 4.6 g/dL     3.5-5.0                   



             9692653097)                                         

 

             ALK PHOS (test code = 208 U/L             H            



             0969477647)                                         

 

             ALTv (test code = 51 U/L       5-50         H            



             2-6)                                             

 

             AST(SGOT) (test code = 18 U/L       13-40                     



             6351949241)                                         

 

             eGFR (test code =              mL/min/1.73m2              



             5895814738)                                         

 

             MAL (test code = MAL) Association of                           



                          Glomerular Filtration                           



                          Rate (GFR) and Staging                           



                          of Kidney Disease*                           



                          +---------------------                           



                          --+-------------------                           



                          --+-------------------                           



                          ------+| GFR                           



                          (mL/min/1.73 m2) ?|                           



                          With Kidney Damage ?|                           



                          ?Without Kidney                           



                          Damage+---------------                           



                          --------+-------------                           



                          --------+-------------                           



                          ------------+| ?>90 ?                           



                          ? ? ? ? ? ? ? ?|                           



                          ?Stage one ? ? ? ? ?|                           



                          ? Normal ? ? ? ? ? ? ?                           



                          ?+--------------------                           



                          ---+------------------                           



                          ---+------------------                           



                          -------+| ?60-89 ? ? ?                           



                          ? ? ? ? ?| ?Stage two                           



                          ? ? ? ? ?| ? Decreased                           



                          GFR ? ? ? ?                            



                          +---------------------                           



                          --+-------------------                           



                          --+-------------------                           



                          ------+| ?30-59 ? ? ?                           



                          ? ? ? ? ?| ?Stage                           



                          three ? ? ? ?| ? Stage                           



                          three ? ? ? ? ?                           



                          +---------------------                           



                          --+-------------------                           



                          --+-------------------                           



                          ------+| ?15-29 ? ? ?                           



                          ? ? ? ? ?| ?Stage four                           



                          ? ? ? ? | ? Stage four                           



                          ? ? ? ? ?                              



                          ?+--------------------                           



                          ---+------------------                           



                          ---+------------------                           



                          -------+| ?<15 (or                           



                          dialysis) ? ?| ?Stage                           



                          five ? ? ? ? | ? Stage                           



                          five ? ? ? ? ?                           



                          ?+--------------------                           



                          ---+------------------                           



                          ---+------------------                           



                          -------+ *Each stage                           



                          assumes the associated                           



                          GFR level has been in                           



                          effect for at least                           



                          three months. ?Stages                           



                          1 to 5, with or                           



                          without kidney                           



                          disease, indicate                           



                          chronic kidney                           



                          disease. Notes:                           



                          Determination of                           



                          stages one and two                           



                          (with eGFR                             



                          >59mL/min/1.73 m2)                           



                          requires estimation of                           



                          kidney damage for at                           



                          least three months as                           



                          defined by structural                           



                          or functional                           



                          abnormalities of the                           



                          kidney, manifested by                           



                          either:Pathological                           



                          abnormalities or                           



                          Markers of kidney                           



                          damage (including                           



                          abnormalities in the                           



                          composition of the                           



                          blood or urine or                           



                          abnormalities in                           



                          imaging tests).                           

 

             Lab Interpretation Abnormal                               



             (test code = 69170-0)                                        



[facility]





             Test Item    Value        Reference Range Interpretation Comments

 

             LIPASE (test code = 3378943797) 137 U/L      0-220                 

    

 

             Lab Interpretation (test code = Normal                             

    



             31760-3)                                            



[facility] WITH [id number] 04:07:59





             Test Item    Value        Reference Range Interpretation Comments

 

             WBC (test code =              See_Comment  H             [Automated



             6690-2)                                             message] The sy

stem



                                                                 which generated



                                                                 this result



                                                                 transmitted



                                                                 reference range

:



                                                                 4.20 - 10.70



                                                                 10*3/?L. The



                                                                 reference range

 was



                                                                 not used to



                                                                 interpret this



                                                                 result as



                                                                 normal/abnormal

.

 

             RBC (test code =              See_Comment  H             [Automated



             789-8)                                              message] The sy

stem



                                                                 which generated



                                                                 this result



                                                                 transmitted



                                                                 reference range

:



                                                                 4.26 - 5.52



                                                                 10*6/?L. The



                                                                 reference range

 was



                                                                 not used to



                                                                 interpret this



                                                                 result as



                                                                 normal/abnormal

.

 

             HGB (test code = 16.8 g/dL    12.2-16.4    H            



             718-7)                                              

 

             HCT (test code = 49.2 %       38.4-49.3                 



             4544-3)                                             

 

             MCV (test code = 86.2 fL      81.7-95.6                 



             787-2)                                              

 

             MCH (test code = 29.4 pg      26.1-32.7                 



             785-6)                                              

 

             MCHC (test code = 34.1 g/dL    31.2-35.0                 



             786-4)                                              

 

             RDW-SD (test code = 42.6 fL      38.5-51.6                 



             53092-5)                                            

 

             RDW-CV (test code = 13.6 %       12.1-15.4                 



             788-0)                                              

 

             PLT (test code =              See_Comment  H             [Automated



             777-3)                                              message] The sy

stem



                                                                 which generated



                                                                 this result



                                                                 transmitted



                                                                 reference range

:



                                                                 150 - 328 10*3/

?L.



                                                                 The reference r

zhen



                                                                 was not used to



                                                                 interpret this



                                                                 result as



                                                                 normal/abnormal

.

 

             MPV (test code = 10.7 fL      9.8-13.0                  



             23017-9)                                            

 

             NRBC/100 WBC (test              See_Comment                [Automat

ed



             code = 4511669077)                                        message] 

The system



                                                                 which generated



                                                                 this result



                                                                 transmitted



                                                                 reference range

:



                                                                 0.0 - 10.0 /100



                                                                 WBCs. The refer

ence



                                                                 range was not u

sed



                                                                 to interpret th

is



                                                                 result as



                                                                 normal/abnormal

.

 

             NRBC x10^3 (test code <0.01        See_Comment                [Auto

mated



             = 7485495388)                                        message] The s

ystem



                                                                 which generated



                                                                 this result



                                                                 transmitted



                                                                 reference range

:



                                                                 10*3/?L. The



                                                                 reference range

 was



                                                                 not used to



                                                                 interpret this



                                                                 result as



                                                                 normal/abnormal

.

 

             GRAN MAT (NEUT) % 74.2 %                                 



             (test code = 770-8)                                        

 

             IMM GRAN % (test code 0.50 %                                 



             = 9319252778)                                        

 

             LYMPH % (test code = 14.8 %                                 



             736-9)                                              

 

             MONO % (test code = 8.3 %                                  



             5905-5)                                             

 

             EOS % (test code = 1.9 %                                  



             713-8)                                              

 

             BASO % (test code = 0.3 %                                  



             706-2)                                              

 

             GRAN MAT x10^3(ANC) 8.01 10*3/uL 1.99-6.95    H            



             (test code =                                        



             9546666046)                                         

 

             IMM GRAN x10^3 (test 0.05 10*3/uL 0.00-0.06                 



             code = 4651191600)                                        

 

             LYMPH x10^3 (test code 1.59 10*3/uL 1.09-3.23                 



             = 731-0)                                            

 

             MONO x10^3 (test code 0.89 10*3/uL 0.36-1.02                 



             = 742-7)                                            

 

             EOS x10^3 (test code = 0.20 10*3/uL 0.06-0.53                 



             711-2)                                              

 

             BASO x10^3 (test code 0.03 10*3/uL 0.01-0.09                 



             = 704-7)                                            

 

             Lab Interpretation Abnormal                               



             (test code = 07342-3)                                        



[facility] GLUCOSE (AUTOMATED)2022-06-10 17:25:00





             Test Item    Value        Reference Range Interpretation Comments

 

             POCT GLU (test code = 0087095187) 249 mg/dL           H      

      

 

             Lab Interpretation (test code = Abnormal                           

    



             02829-2)                                            



[facility] GLUCOSE (AUTOMATED)2022-06-10 12:54:01





             Test Item    Value        Reference Range Interpretation Comments

 

             POCT GLU (test code = 5991685399) 188 mg/dL           H      

      

 

             Lab Interpretation (test code = Abnormal                           

    



             68164-6)                                            



[facility] METABOLIC PANEL (NA, K, CL, CO2, 
GLUCOSE, BUN, CREATININE, CA)2022-06-10 11:48:23





             Test Item    Value        Reference Range Interpretation Comments

 

             NA (test code = 137 mmol/L   135-145                   



             0570848033)                                         

 

             K (test code = 4.3 mmol/L   3.5-5.0                   



             9133375711)                                         

 

             CL (test code = 105 mmol/L                       



             3283781036)                                         

 

             CO2 TOTAL (test code = 23 mmol/L    23-31                     



             0730808765)                                         

 

             AGAP (test code =              2-16                      



             6948019982)                                         

 

             BUN (test code = 19 mg/dL     7-23                      



             9911609085)                                         

 

             GLUCOSE (test code = 243 mg/dL           H            



             0254720550)                                         

 

             CREATININE (test code = 0.50 mg/dL   0.60-1.25    L            



             9235436360)                                         

 

             CALCIUM (test code = 8.9 mg/dL    8.6-10.6                  



             1532275990)                                         

 

             eGFR (test code =              mL/min/1.73m2              



             1012961458)                                         

 

             MAL (test code = MAL) Association of                           



                          Glomerular Filtration                           



                          Rate (GFR) and Staging                           



                          of Kidney Disease*                           



                          +---------------------                           



                          --+-------------------                           



                          --+-------------------                           



                          ------+| GFR                           



                          (mL/min/1.73 m2) ?|                           



                          With Kidney Damage ?|                           



                          ?Without Kidney                           



                          Damage+---------------                           



                          --------+-------------                           



                          --------+-------------                           



                          ------------+| ?>90 ?                           



                          ? ? ? ? ? ? ? ?|                           



                          ?Stage one ? ? ? ? ?|                           



                          ? Normal ? ? ? ? ? ? ?                           



                          ?+--------------------                           



                          ---+------------------                           



                          ---+------------------                           



                          -------+| ?60-89 ? ? ?                           



                          ? ? ? ? ?| ?Stage two                           



                          ? ? ? ? ?| ? Decreased                           



                          GFR ? ? ? ?                            



                          +---------------------                           



                          --+-------------------                           



                          --+-------------------                           



                          ------+| ?30-59 ? ? ?                           



                          ? ? ? ? ?| ?Stage                           



                          three ? ? ? ?| ? Stage                           



                          three ? ? ? ? ?                           



                          +---------------------                           



                          --+-------------------                           



                          --+-------------------                           



                          ------+| ?15-29 ? ? ?                           



                          ? ? ? ? ?| ?Stage four                           



                          ? ? ? ? | ? Stage four                           



                          ? ? ? ? ?                              



                          ?+--------------------                           



                          ---+------------------                           



                          ---+------------------                           



                          -------+| ?<15 (or                           



                          dialysis) ? ?| ?Stage                           



                          five ? ? ? ? | ? Stage                           



                          five ? ? ? ? ?                           



                          ?+--------------------                           



                          ---+------------------                           



                          ---+------------------                           



                          -------+ *Each stage                           



                          assumes the associated                           



                          GFR level has been in                           



                          effect for at least                           



                          three months. ?Stages                           



                          1 to 5, with or                           



                          without kidney                           



                          disease, indicate                           



                          chronic kidney                           



                          disease. Notes:                           



                          Determination of                           



                          stages one and two                           



                          (with eGFR                             



                          >59mL/min/1.73 m2)                           



                          requires estimation of                           



                          kidney damage for at                           



                          least three months as                           



                          defined by structural                           



                          or functional                           



                          abnormalities of the                           



                          kidney, manifested by                           



                          either:Pathological                           



                          abnormalities or                           



                          Markers of kidney                           



                          damage (including                           



                          abnormalities in the                           



                          composition of the                           



                          blood or urine or                           



                          abnormalities in                           



                          imaging tests).                           

 

             Lab Interpretation Abnormal                               



             (test code = 11118-6)                                        



[facility]





             Test Item    Value        Reference Range Interpretation Comments

 

             MAGNESIUM (test code = 3457054051) 2.1 mg/dL    1.7-2.4            

       

 

             Lab Interpretation (test code = Normal                             

    



             40918-0)                                            



[facility] WITH DIFF2022-06-10 11:09:44





             Test Item    Value        Reference Range Interpretation Comments

 

             WBC (test code =              See_Comment                [Automated

 message]



             6690-2)                                             The system [employer]



                                                                 generated this 

result



                                                                 transmitted ref

erence



                                                                 range: 4.20 - 1

0.70



                                                                 10*3/?L. The re

ference



                                                                 range was not u

sed to



                                                                 interpret this 

result



                                                                 as normal/abnor

mal.

 

             RBC (test code =              See_Comment                [Automated

 message]



             789-8)                                              The system [employer]



                                                                 generated this 

result



                                                                 transmitted ref

erence



                                                                 range: 4.26 - 5

.52



                                                                 10*6/?L. The re

ference



                                                                 range was not u

sed to



                                                                 interpret this 

result



                                                                 as normal/abnor

mal.

 

             HGB (test code = 15.9 g/dL    12.2-16.4                 



             718-7)                                              

 

             HCT (test code = 46.8 %       38.4-49.3                 



             4544-3)                                             

 

             MCV (test code = 87.5 fL      81.7-95.6                 



             787-2)                                              

 

             MCH (test code = 29.7 pg      26.1-32.7                 



             785-6)                                              

 

             MCHC (test code = 34.0 g/dL    31.2-35.0                 



             786-4)                                              

 

             RDW-SD (test code 43.6 fL      38.5-51.6                 



             = 90023-2)                                          

 

             RDW-CV (test code 13.7 %       12.1-15.4                 



             = 788-0)                                            

 

             PLT (test code =              See_Comment                [Automated

 message]



             777-3)                                              The system [employer]



                                                                 generated this 

result



                                                                 transmitted ref

erence



                                                                 range: 150 - 32

8



                                                                 10*3/?L. The re

ference



                                                                 range was not u

sed to



                                                                 interpret this 

result



                                                                 as normal/abnor

mal.

 

             MPV (test code = 11.0 fL      9.8-13.0                  



             10540-0)                                            

 

             NRBC/100 WBC (test              See_Comment                [Automat

ed message]



             code = 4072609344)                                        The syste

m which



                                                                 generated this 

result



                                                                 transmitted ref

erence



                                                                 range: 0.0 - 10

.0 /100



                                                                 WBCs. The refer

ence



                                                                 range was not u

sed to



                                                                 interpret this 

result



                                                                 as normal/abnor

mal.

 

             NRBC x10^3 (test <0.01        See_Comment                [Automated

 message]



             code = 0121349747)                                        The syste

m which



                                                                 generated this 

result



                                                                 transmitted ref

erence



                                                                 range: 10*3/?L.

 The



                                                                 reference range

 was not



                                                                 used to interpr

et this



                                                                 result as



                                                                 normal/abnormal

.

 

             GRAN MAT (NEUT) % 57.9 %                                 



             (test code =                                        



             770-8)                                              

 

             IMM GRAN % (test 0.60 %                                 



             code = 1539661515)                                        

 

             LYMPH % (test code 30.7 %                                 



             = 736-9)                                            

 

             MONO % (test code 8.5 %                                  



             = 5905-5)                                           

 

             EOS % (test code = 2.0 %                                  



             713-8)                                              

 

             BASO % (test code 0.3 %                                  



             = 706-2)                                            

 

             GRAN MAT     5.14 10*3/uL 1.99-6.95                 



             x10^3(ANC) (test                                        



             code = 9452322956)                                        

 

             IMM GRAN x10^3 0.05 10*3/uL 0.00-0.06                 



             (test code =                                        



             1829918195)                                         

 

             LYMPH x10^3 (test 2.73 10*3/uL 1.09-3.23                 



             code = 731-0)                                        

 

             MONO x10^3 (test 0.76 10*3/uL 0.36-1.02                 



             code = 742-7)                                        

 

             EOS x10^3 (test 0.18 10*3/uL 0.06-0.53                 



             code = 711-2)                                        

 

             BASO x10^3 (test 0.03 10*3/uL 0.01-0.09                 



             code = 704-7)                                        



[facility] GLUCOSE (AUTOMATED)2022-06-10 10:34:14





             Test Item    Value        Reference Range Interpretation Comments

 

             POCT GLU (test code = 4880166287) 230 mg/dL           H      

      

 

             Lab Interpretation (test code = Abnormal                           

    



             96165-1)                                            



[facility] GLUCOSE (AUTOMATED)2022-06-10 05:56:50





             Test Item    Value        Reference Range Interpretation Comments

 

             POCT GLU (test code = 8907730641) 314 mg/dL           H      

      

 

             Lab Interpretation (test code = Abnormal                           

    



             53939-9)                                            



[facility] GLUCOSE (AUTOMATED)2022-06-10 04:40:22





             Test Item    Value        Reference Range Interpretation Comments

 

             POCT GLU (test code = 4754691093) 324 mg/dL           H      

      

 

             Lab Interpretation (test code = Abnormal                           

    



             12099-2)                                            



[facility] GLUCOSE (AUTOMATED)2022-06-10 02:37:33





             Test Item    Value        Reference Range Interpretation Comments

 

             POCT GLU (test code = 2871294519) 296 mg/dL           H      

      

 

             Lab Interpretation (test code = Abnormal                           

    



             41715-3)                                            



[facility] GLUCOSE (AUTOMATED)2022-06-10 01:05:02





             Test Item    Value        Reference Range Interpretation Comments

 

             POCT GLU (test code = 0468444291) 319 mg/dL           H      

      

 

             Lab Interpretation (test code = Abnormal                           

    



             63271-8)                                            



[facility] GLUCOSE (AUTOMATED)2022 21:59:09





             Test Item    Value        Reference Range Interpretation Comments

 

             POCT GLU (test code = 7856143093) 257 mg/dL           H      

      

 

             Lab Interpretation (test code = Abnormal                           

    



             35169-6)                                            



[facility] GLUCOSE (AUTOMATED)2022 17:21:41





             Test Item    Value        Reference Range Interpretation Comments

 

             POCT GLU (test code = 2402486320) 214 mg/dL           H      

      

 

             Lab Interpretation (test code = Abnormal                           

    



             40305-6)                                            



[facility] GLUCOSE (AUTOMATED)2022 13:04:04





             Test Item    Value        Reference Range Interpretation Comments

 

             POCT GLU (test code = 0693448281) 234 mg/dL           H      

      

 

             Lab Interpretation (test code = Abnormal                           

    



             32511-8)                                            



[facility] METABOLIC PANEL (NA, K, CL, CO2, 
GLUCOSE, BUN, CREATININE, CA)2022 12:23:33





             Test Item    Value        Reference Range Interpretation Comments

 

             NA (test code = 136 mmol/L   135-145                   



             2371069341)                                         

 

             K (test code = 4.1 mmol/L   3.5-5.0                   



             5617800963)                                         

 

             CL (test code = 109 mmol/L          H            



             5099681092)                                         

 

             CO2 TOTAL (test code = 20 mmol/L    23-31        L            



             2652685946)                                         

 

             AGAP (test code =              2-16                      



             6264626736)                                         

 

             BUN (test code = 16 mg/dL     7-479)                                         

 

             GLUCOSE (test code = 281 mg/dL           H            



             7297779961)                                         

 

             CREATININE (test code = 0.50 mg/dL   0.60-1.25    L            



             2691950144)                                         

 

             CALCIUM (test code = 8.3 mg/dL    8.6-10.6     L            



             3484550265)                                         

 

             eGFR (test code =              mL/min/1.73m2              



             3066951987)                                         

 

             MAL (test code = MAL) Association of                           



                          Glomerular Filtration                           



                          Rate (GFR) and Staging                           



                          of Kidney Disease*                           



                          +---------------------                           



                          --+-------------------                           



                          --+-------------------                           



                          ------+| GFR                           



                          (mL/min/1.73 m2) ?|                           



                          With Kidney Damage ?|                           



                          ?Without Kidney                           



                          Damage+---------------                           



                          --------+-------------                           



                          --------+-------------                           



                          ------------+| ?>90 ?                           



                          ? ? ? ? ? ? ? ?|                           



                          ?Stage one ? ? ? ? ?|                           



                          ? Normal ? ? ? ? ? ? ?                           



                          ?+--------------------                           



                          ---+------------------                           



                          ---+------------------                           



                          -------+| ?60-89 ? ? ?                           



                          ? ? ? ? ?| ?Stage two                           



                          ? ? ? ? ?| ? Decreased                           



                          GFR ? ? ? ?                            



                          +---------------------                           



                          --+-------------------                           



                          --+-------------------                           



                          ------+| ?30-59 ? ? ?                           



                          ? ? ? ? ?| ?Stage                           



                          three ? ? ? ?| ? Stage                           



                          three ? ? ? ? ?                           



                          +---------------------                           



                          --+-------------------                           



                          --+-------------------                           



                          ------+| ?15-29 ? ? ?                           



                          ? ? ? ? ?| ?Stage four                           



                          ? ? ? ? | ? Stage four                           



                          ? ? ? ? ?                              



                          ?+--------------------                           



                          ---+------------------                           



                          ---+------------------                           



                          -------+| ?<15 (or                           



                          dialysis) ? ?| ?Stage                           



                          five ? ? ? ? | ? Stage                           



                          five ? ? ? ? ?                           



                          ?+--------------------                           



                          ---+------------------                           



                          ---+------------------                           



                          -------+ *Each stage                           



                          assumes the associated                           



                          GFR level has been in                           



                          effect for at least                           



                          three months. ?Stages                           



                          1 to 5, with or                           



                          without kidney                           



                          disease, indicate                           



                          chronic kidney                           



                          disease. Notes:                           



                          Determination of                           



                          stages one and two                           



                          (with eGFR                             



                          >59mL/min/1.73 m2)                           



                          requires estimation of                           



                          kidney damage for at                           



                          least three months as                           



                          defined by structural                           



                          or functional                           



                          abnormalities of the                           



                          kidney, manifested by                           



                          either:Pathological                           



                          abnormalities or                           



                          Markers of kidney                           



                          damage (including                           



                          abnormalities in the                           



                          composition of the                           



                          blood or urine or                           



                          abnormalities in                           



                          imaging tests).                           

 

             Lab Interpretation Abnormal                               



             (test code = 92649-7)                                        



[facility]





             Test Item    Value        Reference Range Interpretation Comments

 

             MAGNESIUM (test code = 3662722973) 1.6 mg/dL    1.7-2.4      L     

       

 

             Lab Interpretation (test code = Abnormal                           

    



             98984-6)                                            



[facility]





             Test Item    Value        Reference Range Interpretation Comments

 

             PHOSPHORUS (test code = 3527051844) 3.2 mg/dL    2.5-5.0           

        

 

             Lab Interpretation (test code = Normal                             

    



             88556-2)                                            



[facility] 
10:04:37





             Test Item    Value        Reference Range Interpretation Comments

 

             HGB A1C (test code = 9.6 %        4.0-5.7      H            



             4548-4)                                             

 

             MAL (test code = MAL) Reference                              



                          RangesNormal:                           



                          <5.7%Prediabetes:                           



                          5.7 - 6.4%Diabetes:                           



                          > 6.5%                                 

 

             Lab Interpretation (test Abnormal                               



             code = 13361-9)                                        



[id number]





             Test Item    Value        Reference Range Interpretation Comments

 

             WBC (test code =              See_Comment                [Automated

 message]



             2290-2)                                             The system [employer]



                                                                 generated this 

result



                                                                 transmitted ref

erence



                                                                 range: 4.20 - 1

0.70



                                                                 10*3/?L. The re

ference



                                                                 range was not u

sed to



                                                                 interpret this 

result



                                                                 as normal/abnor

mal.

 

             RBC (test code =              See_Comment                [Automated

 message]



             499-8)                                              The system [employer]



                                                                 generated this 

result



                                                                 transmitted ref

erence



                                                                 range: 4.26 - 5

.52



                                                                 10*6/?L. The re

ference



                                                                 range was not u

sed to



                                                                 interpret this 

result



                                                                 as normal/abnor

mal.

 

             HGB (test code = 14.7 g/dL    12.2-16.4                 



             718-7)                                              

 

             HCT (test code = 43.3 %       38.4-49.3                 



             4544-3)                                             

 

             MCV (test code = 86.9 fL      81.7-95.6                 



             787-2)                                              

 

             MCH (test code = 29.5 pg      26.1-32.7                 



             785-6)                                              

 

             MCHC (test code = 33.9 g/dL    31.2-35.0                 



             786-4)                                              

 

             RDW-SD (test code 42.5 fL      38.5-51.6                 



             = 15870-8)                                          

 

             RDW-CV (test code 13.5 %       12.1-15.4                 



             = 788-0)                                            

 

             PLT (test code =              See_Comment                [Automated

 message]



             937-3)                                              The system [employer]



                                                                 generated this 

result



                                                                 transmitted ref

erence



                                                                 range: 150 - 32

8



                                                                 10*3/?L. The re

ference



                                                                 range was not u

sed to



                                                                 interpret this 

result



                                                                 as normal/abnor

mal.

 

             MPV (test code = 10.8 fL      9.8-13.0                  



             97238-0)                                            

 

             NRBC/100 WBC (test              See_Comment                [Automat

ed message]



             code = 3787515807)                                        The syste

m which



                                                                 generated this 

result



                                                                 transmitted ref

erence



                                                                 range: 0.0 - 10

.0 /100



                                                                 WBCs. The refer

ence



                                                                 range was not u

sed to



                                                                 interpret this 

result



                                                                 as normal/abnor

mal.

 

             NRBC x10^3 (test <0.01        See_Comment                [Automated

 message]



             code = 1801640331)                                        The syste

m which



                                                                 generated this 

result



                                                                 transmitted ref

erence



                                                                 range: 10*3/?L.

 The



                                                                 reference range

 was not



                                                                 used to interpr

et this



                                                                 result as



                                                                 normal/abnormal

.

 

             GRAN MAT (NEUT) % 58.6 %                                 



             (test code =                                        



             770-8)                                              

 

             IMM GRAN % (test 0.50 %                                 



             code = 0057504261)                                        

 

             LYMPH % (test code 31.3 %                                 



             = 736-9)                                            

 

             MONO % (test code 6.9 %                                  



             = 5905-5)                                           

 

             EOS % (test code = 2.4 %                                  



             713-8)                                              

 

             BASO % (test code 0.3 %                                  



             = 706-2)                                            

 

             GRAN MAT     5.39 10*3/uL 1.99-6.95                 



             x10^3(ANC) (test                                        



             code = 8772148471)                                        

 

             IMM GRAN x10^3 0.05 10*3/uL 0.00-0.06                 



             (test code =                                        



             9200755965)                                         

 

             LYMPH x10^3 (test 2.89 10*3/uL 1.09-3.23                 



             code = 731-0)                                        

 

             MONO x10^3 (test 0.64 10*3/uL 0.36-1.02                 



             code = 742-7)                                        

 

             EOS x10^3 (test 0.22 10*3/uL 0.06-0.53                 



             code = 711-2)                                        

 

             BASO x10^3 (test 0.03 10*3/uL 0.01-0.09                 



             code = 704-7)                                        



[facility] GLUCOSE (AUTOMATED)2022 09:06:33





             Test Item    Value        Reference Range Interpretation Comments

 

             POCT GLU (test code = 0935523070) 206 mg/dL           H      

      

 

             Lab Interpretation (test code = Abnormal                           

    



             64743-0)                                            



[facility] GLUCOSE (AUTOMATED)2022 04:38:41





             Test Item    Value        Reference Range Interpretation Comments

 

             POCT GLU (test code = 2535118549) 272 mg/dL           H      

      

 

             Lab Interpretation (test code = Abnormal                           

    



             83221-3)                                            



[facility] GLUCOSE (AUTOMATED)2022 01:14:47





             Test Item    Value        Reference Range Interpretation Comments

 

             POCT GLU (test code = 1873554724) 256 mg/dL           H      

      

 

             Lab Interpretation (test code = Abnormal                           

    



             26091-1)                                            



[facility] GLUCOSE (AUTOMATED)2022 21:11:19





             Test Item    Value        Reference Range Interpretation Comments

 

             POCT GLU (test code = 9244781812) 254 mg/dL           H      

      

 

             Lab Interpretation (test code = Abnormal                           

    



             44279-1)                                            



[facility] GLUCOSE (AUTOMATED)2022 17:10:33





             Test Item    Value        Reference Range Interpretation Comments

 

             POCT GLU (test code = 2393170680) 350 mg/dL           H      

      

 

             Lab Interpretation (test code = Abnormal                           

    



             25327-6)                                            



[facility] GLUCOSE (AUTOMATED)2022 12:50:18





             Test Item    Value        Reference Range Interpretation Comments

 

             POCT GLU (test code = 6375501045) 269 mg/dL           H      

      

 

             Lab Interpretation (test code = Abnormal                           

    



             00299-9)                                            



[facility] Metabolic Panel (NA, K, CL, CO2, 
GLUCOSE, BUN, CREATININE, CA)2022 10:18:27





             Test Item    Value        Reference Range Interpretation Comments

 

             NA (test code = 137 mmol/L   135-145                   



             3619382855)                                         

 

             K (test code = 4.0 mmol/L   3.5-5.0                   



             1877612976)                                         

 

             CL (test code = 108 mmol/L                       



             0273158660)                                         

 

             CO2 TOTAL (test code = 23 mmol/L    23-31                     



             5882277838)                                         

 

             AGAP (test code =              2-16                      



             6125660524)                                         

 

             BUN (test code = 16 mg/dL     7-23                      



             [id number])                                         

 

             GLUCOSE (test code = 386 mg/dL           H            



             7426902964)                                         

 

             CREATININE (test code = 0.70 mg/dL   0.60-1.25                 



             3903766626)                                         

 

             CALCIUM (test code = 8.6 mg/dL    8.6-10.6                  



             1921244061)                                         

 

             eGFR (test code =              mL/min/1.73m2              



             8863552045)                                         

 

             MAL (test code = MAL) Association of                           



                          Glomerular Filtration                           



                          Rate (GFR) and Staging                           



                          of Kidney Disease*                           



                          +---------------------                           



                          --+-------------------                           



                          --+-------------------                           



                          ------+| GFR                           



                          (mL/min/1.73 m2) ?|                           



                          With Kidney Damage ?|                           



                          ?Without Kidney                           



                          Damage+---------------                           



                          --------+-------------                           



                          --------+-------------                           



                          ------------+| ?>90 ?                           



                          ? ? ? ? ? ? ? ?|                           



                          ?Stage one ? ? ? ? ?|                           



                          ? Normal ? ? ? ? ? ? ?                           



                          ?+--------------------                           



                          ---+------------------                           



                          ---+------------------                           



                          -------+| ?60-89 ? ? ?                           



                          ? ? ? ? ?| ?Stage two                           



                          ? ? ? ? ?| ? Decreased                           



                          GFR ? ? ? ?                            



                          +---------------------                           



                          --+-------------------                           



                          --+-------------------                           



                          ------+| ?30-59 ? ? ?                           



                          ? ? ? ? ?| ?Stage                           



                          three ? ? ? ?| ? Stage                           



                          three ? ? ? ? ?                           



                          +---------------------                           



                          --+-------------------                           



                          --+-------------------                           



                          ------+| ?15-29 ? ? ?                           



                          ? ? ? ? ?| ?Stage four                           



                          ? ? ? ? | ? Stage four                           



                          ? ? ? ? ?                              



                          ?+--------------------                           



                          ---+------------------                           



                          ---+------------------                           



                          -------+| ?<15 (or                           



                          dialysis) ? ?| ?Stage                           



                          five ? ? ? ? | ? Stage                           



                          five ? ? ? ? ?                           



                          ?+--------------------                           



                          ---+------------------                           



                          ---+------------------                           



                          -------+ *Each stage                           



                          assumes the associated                           



                          GFR level has been in                           



                          effect for at least                           



                          three months. ?Stages                           



                          1 to 5, with or                           



                          without kidney                           



                          disease, indicate                           



                          chronic kidney                           



                          disease. Notes:                           



                          Determination of                           



                          stages one and two                           



                          (with eGFR                             



                          >59mL/min/1.73 m2)                           



                          requires estimation of                           



                          kidney damage for at                           



                          least three months as                           



                          defined by structural                           



                          or functional                           



                          abnormalities of the                           



                          kidney, manifested by                           



                          either:Pathological                           



                          abnormalities or                           



                          Markers of kidney                           



                          damage (including                           



                          abnormalities in the                           



                          composition of the                           



                          blood or urine or                           



                          abnormalities in                           



                          imaging tests).                           

 

             Lab Interpretation Abnormal                               



             (test code = 92722-1)                                        



[facility] with [id number] 10:03:31





             Test Item    Value        Reference Range Interpretation Comments

 

             WBC (test code =              See_Comment                [Automated

 message]



             6690-2)                                             The system [employer]



                                                                 generated this 

result



                                                                 transmitted ref

erence



                                                                 range: 4.20 - 1

0.70



                                                                 10*3/?L. The re

ference



                                                                 range was not u

sed to



                                                                 interpret this 

result



                                                                 as normal/abnor

mal.

 

             RBC (test code =              See_Comment                [Automated

 message]



             789-8)                                              The system whic

h



                                                                 generated this 

result



                                                                 transmitted ref

erence



                                                                 range: 4.26 - 5

.52



                                                                 10*6/?L. The re

ference



                                                                 range was not u

sed to



                                                                 interpret this 

result



                                                                 as normal/abnor

mal.

 

             HGB (test code = 15.5 g/dL    12.2-16.4                 



             718-7)                                              

 

             HCT (test code = 45.0 %       38.4-49.3                 



             4544-3)                                             

 

             MCV (test code = 85.9 fL      81.7-95.6                 



             787-2)                                              

 

             MCH (test code = 29.6 pg      26.1-32.7                 



             785-6)                                              

 

             MCHC (test code = 34.4 g/dL    31.2-35.0                 



             786-4)                                              

 

             RDW-SD (test code 42.5 fL      38.5-51.6                 



             = 34978-7)                                          

 

             RDW-CV (test code 13.5 %       12.1-15.4                 



             = 788-0)                                            

 

             PLT (test code =              See_Comment                [Automated

 message]



             777-3)                                              The system [employer]

h



                                                                 generated this 

result



                                                                 transmitted ref

erence



                                                                 range: 150 - 32

8



                                                                 10*3/?L. The re

ference



                                                                 range was not u

sed to



                                                                 interpret this 

result



                                                                 as normal/abnor

mal.

 

             MPV (test code = 11.2 fL      9.8-13.0                  



             27502-8)                                            

 

             NRBC/100 WBC (test              See_Comment                [Automat

ed message]



             code = 8803209928)                                        The syste

m which



                                                                 generated this 

result



                                                                 transmitted ref

erence



                                                                 range: 0.0 - 10

.0 /100



                                                                 WBCs. The refer

ence



                                                                 range was not u

sed to



                                                                 interpret this 

result



                                                                 as normal/abnor

mal.

 

             NRBC x10^3 (test <0.01        See_Comment                [Automated

 message]



             code = 8423164004)                                        The syste

m which



                                                                 generated this 

result



                                                                 transmitted ref

erence



                                                                 range: 10*3/?L.

 The



                                                                 reference range

 was not



                                                                 used to interpr

et this



                                                                 result as



                                                                 normal/abnormal

.

 

             GRAN MAT (NEUT) % 63.5 %                                 



             (test code =                                        



             770-8)                                              

 

             IMM GRAN % (test 0.30 %                                 



             code = 6382579846)                                        

 

             LYMPH % (test code 24.8 %                                 



             = 736-9)                                            

 

             MONO % (test code 8.7 %                                  



             = 5905-5)                                           

 

             EOS % (test code = 2.5 %                                  



             713-8)                                              

 

             BASO % (test code 0.2 %                                  



             = 706-2)                                            

 

             GRAN MAT     6.05 10*3/uL 1.99-6.95                 



             x10^3(ANC) (test                                        



             code = 1441674984)                                        

 

             IMM GRAN x10^3 0.03 10*3/uL 0.00-0.06                 



             (test code =                                        



             8817177080)                                         

 

             LYMPH x10^3 (test 2.37 10*3/uL 1.09-3.23                 



             code = 731-0)                                        

 

             MONO x10^3 (test 0.83 10*3/uL 0.36-1.02                 



             code = 742-7)                                        

 

             EOS x10^3 (test 0.24 10*3/uL 0.06-0.53                 



             code = 711-2)                                        

 

             BASO x10^3 (test <0.03        0.01-0.09                 



             code = 704-7)                                        



[facility] GLUCOSE (AUTOMATED)2022 09:00:15





             Test Item    Value        Reference Range Interpretation Comments

 

             POCT GLU (test code = 9523347118) 402 mg/dL           H      

      

 

             Lab Interpretation (test code = Abnormal                           

    



             19439-0)                                            



[facility] GLUCOSE (AUTOMATED)2022 04:54:36





             Test Item    Value        Reference Range Interpretation Comments

 

             POCT GLU (test code = 5002142765) 368 mg/dL           H      

      

 

             Lab Interpretation (test code = Abnormal                           

    



             73330-4)                                            



[facility] GLUCOSE (AUTOMATED)2022 03:13:40





             Test Item    Value        Reference Range Interpretation Comments

 

             POCT GLU (test code = 6785118171) 438 mg/dL           H      

      

 

             Lab Interpretation (test code = Abnormal                           

    



             51757-4)                                            



[facility] 
[facility]39





             Test Item    Value        Reference Range Interpretation Comments

 

             NA (test code = 133 mmol/L   135-145      L            



             4709369700)                                         

 

             K (test code = 4.7 mmol/L   3.5-5.0                   



             9808319519)                                         

 

             CL (test code = 99 mmol/L                        



             1093951145)                                         

 

             CO2 TOTAL (test code = 21 mmol/L    23-31        L            



             1965935809)                                         

 

             AGAP (test code =              2-16                      



             1256767888)                                         

 

             BUN (test code = 18 mg/dL     7-23                      



             3863896923)                                         

 

             GLUCOSE (test code = 660 mg/dL           HH           



             5106037645)                                         

 

             CREATININE (test code = 0.64 mg/dL   0.60-1.25                 



             4575981499)                                         

 

             TOTAL BILI (test code = 1.0 mg/dL    0.1-1.1                   



             8541680474)                                         

 

             CALCIUM (test code = 9.7 mg/dL    8.6-10.6                  



             3654174732)                                         

 

             T PROTEIN (test code = 7.9 g/dL     6.3-8.2                   



             7184447107)                                         

 

             ALBUMIN (test code = 4.4 g/dL     3.5-5.0                   



             2550062549)                                         

 

             ALK PHOS (test code = 143 U/L             H            



             3504591218)                                         

 

             ALTv (test code = 47 U/L       -50                      



             -6)                                             

 

             AST(SGOT) (test code = 30 U/L       13-40                     



             3219687368)                                         

 

             eGFR (test code =              mL/min/1.73m2              



             1828555813)                                         

 

             MAL (test code = MAL) Association of                           



                          Glomerular Filtration                           



                          Rate (GFR) and Staging                           



                          of Kidney Disease*                           



                          +---------------------                           



                          --+-------------------                           



                          --+-------------------                           



                          ------+| GFR                           



                          (mL/min/1.73 m2) ?|                           



                          With Kidney Damage ?|                           



                          ?Without Kidney                           



                          Damage+---------------                           



                          --------+-------------                           



                          --------+-------------                           



                          ------------+| ?>90 ?                           



                          ? ? ? ? ? ? ? ?|                           



                          ?Stage one ? ? ? ? ?|                           



                          ? Normal ? ? ? ? ? ? ?                           



                          ?+--------------------                           



                          ---+------------------                           



                          ---+------------------                           



                          -------+| ?60-89 ? ? ?                           



                          ? ? ? ? ?| ?Stage two                           



                          ? ? ? ? ?| ? Decreased                           



                          GFR ? ? ? ?                            



                          +---------------------                           



                          --+-------------------                           



                          --+-------------------                           



                          ------+| ?30-59 ? ? ?                           



                          ? ? ? ? ?| ?Stage                           



                          three ? ? ? ?| ? Stage                           



                          three ? ? ? ? ?                           



                          +---------------------                           



                          --+-------------------                           



                          --+-------------------                           



                          ------+| ?15-29 ? ? ?                           



                          ? ? ? ? ?| ?Stage four                           



                          ? ? ? ? | ? Stage four                           



                          ? ? ? ? ?                              



                          ?+--------------------                           



                          ---+------------------                           



                          ---+------------------                           



                          -------+| ?<15 (or                           



                          dialysis) ? ?| ?Stage                           



                          five ? ? ? ? | ? Stage                           



                          five ? ? ? ? ?                           



                          ?+--------------------                           



                          ---+------------------                           



                          ---+------------------                           



                          -------+ *Each stage                           



                          assumes the associated                           



                          GFR level has been in                           



                          effect for at least                           



                          three months. ?Stages                           



                          1 to 5, with or                           



                          without kidney                           



                          disease, indicate                           



                          chronic kidney                           



                          disease. Notes:                           



                          Determination of                           



                          stages one and two                           



                          (with eGFR                             



                          >59mL/min/1.73 m2)                           



                          requires estimation of                           



                          kidney damage for at                           



                          least three months as                           



                          defined by structural                           



                          or functional                           



                          abnormalities of the                           



                          kidney, manifested by                           



                          either:Pathological                           



                          abnormalities or                           



                          Markers of kidney                           



                          damage (including                           



                          abnormalities in the                           



                          composition of the                           



                          blood or urine or                           



                          abnormalities in                           



                          imaging tests).                           

 

             Lab Interpretation Abnormal                               



             (test code = 85085-8)                                        



[facility]





             Test Item    Value        Reference Range Interpretation Comments

 

             LIPASE (test code = 6391265961) 255 U/L      0-220        H        

    

 

             Lab Interpretation (test code = Abnormal                           

    



             98344-9)                                            



[id number] 00:38:14





             Test Item    Value        Reference Range Interpretation Comments

 

             WBC (test code =              See_Comment  H             [Automated



             6690-2)                                             message] The sy

stem



                                                                 which generated



                                                                 this result



                                                                 transmitted



                                                                 reference range

:



                                                                 4.20 - 10.70



                                                                 10*3/?L. The



                                                                 reference range

 was



                                                                 not used to



                                                                 interpret this



                                                                 result as



                                                                 normal/abnormal

.

 

             RBC (test code =              See_Comment  H             [Automated



             789-8)                                              message] The sy

stem



                                                                 which generated



                                                                 this result



                                                                 transmitted



                                                                 reference range

:



                                                                 4.26 - 5.52



                                                                 10*6/?L. The



                                                                 reference range

 was



                                                                 not used to



                                                                 interpret this



                                                                 result as



                                                                 normal/abnormal

.

 

             HGB (test code = 16.5 g/dL    12.2-16.4    H            



             718-7)                                              

 

             HCT (test code = 48.9 %       38.4-49.3                 



             4544-3)                                             

 

             MCV (test code = 87.2 fL      81.7-95.6                 



             787-2)                                              

 

             MCH (test code = 29.4 pg      26.1-32.7                 



             785-6)                                              

 

             MCHC (test code = 33.7 g/dL    31.2-35.0                 



             786-4)                                              

 

             RDW-SD (test code = 43.8 fL      38.5-51.6                 



             89174-9)                                            

 

             RDW-CV (test code = 13.6 %       12.1-15.4                 



             788-0)                                              

 

             PLT (test code =              See_Comment                [Automated



             777-3)                                              message] The sy

stem



                                                                 which generated



                                                                 this result



                                                                 transmitted



                                                                 reference range

:



                                                                 150 - 328 10*3/

?L.



                                                                 The reference r

zhen



                                                                 was not used to



                                                                 interpret this



                                                                 result as



                                                                 normal/abnormal

.

 

             MPV (test code = 11.4 fL      9.8-13.0                  



             15608-9)                                            

 

             NRBC/100 WBC (test              See_Comment                [Automat

ed



             code = 1535916874)                                        message] 

The system



                                                                 which generated



                                                                 this result



                                                                 transmitted



                                                                 reference range

:



                                                                 0.0 - 10.0 /100



                                                                 WBCs. The refer

ence



                                                                 range was not u

sed



                                                                 to interpret th

is



                                                                 result as



                                                                 normal/abnormal

.

 

             NRBC x10^3 (test code <0.01        See_Comment                [Auto

mated



             = 8432016687)                                        message] The s

ystem



                                                                 which generated



                                                                 this result



                                                                 transmitted



                                                                 reference range

:



                                                                 10*3/?L. The



                                                                 reference range

 was



                                                                 not used to



                                                                 interpret this



                                                                 result as



                                                                 normal/abnormal

.

 

             GRAN MAT (NEUT) % 76.0 %                                 



             (test code = 770-8)                                        

 

             IMM GRAN % (test code 0.50 %                                 



             = 6452625854)                                        

 

             LYMPH % (test code = 15.2 %                                 



             736-9)                                              

 

             MONO % (test code = 6.5 %                                  



             5905-5)                                             

 

             EOS % (test code = 1.6 %                                  



             713-8)                                              

 

             BASO % (test code = 0.2 %                                  



             706-2)                                              

 

             GRAN MAT x10^3(ANC) 8.55 10*3/uL 1.99-6.95    H            



             (test code =                                        



             1112042398)                                         

 

             IMM GRAN x10^3 (test 0.06 10*3/uL 0.00-0.06                 



             code = 9568469043)                                        

 

             LYMPH x10^3 (test code 1.71 10*3/uL 1.09-3.23                 



             = 731-0)                                            

 

             MONO x10^3 (test code 0.73 10*3/uL 0.36-1.02                 



             = 742-7)                                            

 

             EOS x10^3 (test code = 0.18 10*3/uL 0.06-0.53                 



             711-2)                                              

 

             BASO x10^3 (test code <0.03        0.01-0.09                 



             = 704-7)                                            

 

             Lab Interpretation Abnormal                               



             (test code = 61091-6)                                        



[facility]





             Test Item    Value        Reference Range Interpretation Comments

 

             MAGNESIUM (test code = 8927793645) 1.7 mg/dL    1.7-2.4            

       

 

             Lab Interpretation (test code = Normal                             

    



             21942-2)                                            



[facility] METABOLIC PANEL (NA, K, CL, CO2, 
GLUCOSE, BUN, CREATININE, CA)2022 18:32:38





             Test Item    Value        Reference Range Interpretation Comments

 

             NA (test code = 137 mmol/L   135-145                   



             8982556880)                                         

 

             K (test code = 4.3 mmol/L   3.5-5.0                   



             1300981986)                                         

 

             CL (test code = 105 mmol/L                       



             1025726551)                                         

 

             CO2 TOTAL (test code = 23 mmol/L    23-31                     



             0608300530)                                         

 

             AGAP (test code =              2-16                      



             5994609818)                                         

 

             BUN (test code = 17 mg/dL     7-23                      



             4737795072)                                         

 

             GLUCOSE (test code = 317 mg/dL           H            



             9008513262)                                         

 

             CREATININE (test code = 0.57 mg/dL   0.60-1.25    L            



             9079774562)                                         

 

             CALCIUM (test code = 9.3 mg/dL    8.6-10.6                  



             6928461554)                                         

 

             eGFR (test code =              mL/min/1.73m2              



             7986978784)                                         

 

             MAL (test code = MAL) Association of                           



                          Glomerular Filtration                           



                          Rate (GFR) and Staging                           



                          of Kidney Disease*                           



                          +---------------------                           



                          --+-------------------                           



                          --+-------------------                           



                          ------+| GFR                           



                          (mL/min/1.73 m2) ?|                           



                          With Kidney Damage ?|                           



                          ?Without Kidney                           



                          Damage+---------------                           



                          --------+-------------                           



                          --------+-------------                           



                          ------------+| ?>90 ?                           



                          ? ? ? ? ? ? ? ?|                           



                          ?Stage one ? ? ? ? ?|                           



                          ? Normal ? ? ? ? ? ? ?                           



                          ?+--------------------                           



                          ---+------------------                           



                          ---+------------------                           



                          -------+| ?60-89 ? ? ?                           



                          ? ? ? ? ?| ?Stage two                           



                          ? ? ? ? ?| ? Decreased                           



                          GFR ? ? ? ?                            



                          +---------------------                           



                          --+-------------------                           



                          --+-------------------                           



                          ------+| ?30-59 ? ? ?                           



                          ? ? ? ? ?| ?Stage                           



                          three ? ? ? ?| ? Stage                           



                          three ? ? ? ? ?                           



                          +---------------------                           



                          --+-------------------                           



                          --+-------------------                           



                          ------+| ?15-29 ? ? ?                           



                          ? ? ? ? ?| ?Stage four                           



                          ? ? ? ? | ? Stage four                           



                          ? ? ? ? ?                              



                          ?+--------------------                           



                          ---+------------------                           



                          ---+------------------                           



                          -------+| ?<15 (or                           



                          dialysis) ? ?| ?Stage                           



                          five ? ? ? ? | ? Stage                           



                          five ? ? ? ? ?                           



                          ?+--------------------                           



                          ---+------------------                           



                          ---+------------------                           



                          -------+ *Each stage                           



                          assumes the associated                           



                          GFR level has been in                           



                          effect for at least                           



                          three months. ?Stages                           



                          1 to 5, with or                           



                          without kidney                           



                          disease, indicate                           



                          chronic kidney                           



                          disease. Notes:                           



                          Determination of                           



                          stages one and two                           



                          (with eGFR                             



                          >59mL/min/1.73 m2)                           



                          requires estimation of                           



                          kidney damage for at                           



                          least three months as                           



                          defined by structural                           



                          or functional                           



                          abnormalities of the                           



                          kidney, manifested by                           



                          either:Pathological                           



                          abnormalities or                           



                          Markers of kidney                           



                          damage (including                           



                          abnormalities in the                           



                          composition of the                           



                          blood or urine or                           



                          abnormalities in                           



                          imaging tests).                           

 

             Lab Interpretation Abnormal                               



             (test code = 33640-9)                                        



[facility] WITH [id number] 18:10:18





             Test Item    Value        Reference Range Interpretation Comments

 

             WBC (test code =              See_Comment                [Automated

 message]



             6690-2)                                             The system [employer]



                                                                 generated this 

result



                                                                 transmitted ref

erence



                                                                 range: 4.20 - 1

0.70



                                                                 10*3/?L. The re

ference



                                                                 range was not u

sed to



                                                                 interpret this 

result



                                                                 as normal/abnor

mal.

 

             RBC (test code =              See_Comment                [Automated

 message]



             789-8)                                              The system [employer]



                                                                 generated this 

result



                                                                 transmitted ref

erence



                                                                 range: 4.26 - 5

.52



                                                                 10*6/?L. The re

ference



                                                                 range was not u

sed to



                                                                 interpret this 

result



                                                                 as normal/abnor

mal.

 

             HGB (test code = 16.0 g/dL    12.2-16.4                 



             718-7)                                              

 

             HCT (test code = 47.3 %       38.4-49.3                 



             4544-3)                                             

 

             MCV (test code = 87.1 fL      81.7-95.6                 



             787-2)                                              

 

             MCH (test code = 29.5 pg      26.1-32.7                 



             785-6)                                              

 

             MCHC (test code = 33.8 g/dL    31.2-35.0                 



             786-4)                                              

 

             RDW-SD (test code 44.4 fL      38.5-51.6                 



             = 39943-2)                                          

 

             RDW-CV (test code 13.8 %       12.1-15.4                 



             = 788-0)                                            

 

             PLT (test code =              See_Comment                [Automated

 message]



             777-3)                                              The system [employer]



                                                                 generated this 

result



                                                                 transmitted ref

erence



                                                                 range: 150 - 32

8



                                                                 10*3/?L. The re

ference



                                                                 range was not u

sed to



                                                                 interpret this 

result



                                                                 as normal/abnor

mal.

 

             MPV (test code = 10.9 fL      9.8-13.0                  



             08014-9)                                            

 

             NRBC/100 WBC (test              See_Comment                [Automat

ed message]



             code = 1978426337)                                        The [employer] which



                                                                 generated this 

result



                                                                 transmitted ref

erence



                                                                 range: 0.0 - 10

.0 /100



                                                                 WBCs. The refer

ence



                                                                 range was not u

sed to



                                                                 interpret this 

result



                                                                 as normal/abnor

mal.

 

             NRBC x10^3 (test <0.01        See_Comment                [Automated

 message]



             code = 9244367454)                                        The syste

m which



                                                                 generated this 

result



                                                                 transmitted ref

erence



                                                                 range: 10*3/?L.

 The



                                                                 reference range

 was not



                                                                 used to interpr

et this



                                                                 result as



                                                                 normal/abnormal

.

 

             GRAN MAT (NEUT) % 53.4 %                                 



             (test code =                                        



             770-8)                                              

 

             IMM GRAN % (test 0.30 %                                 



             code = 7716816409)                                        

 

             LYMPH % (test code 35.3 %                                 



             = 736-9)                                            

 

             MONO % (test code 7.4 %                                  



             = 5905-5)                                           

 

             EOS % (test code = 3.3 %                                  



             713-8)                                              

 

             BASO % (test code 0.3 %                                  



             = 706-2)                                            

 

             GRAN MAT     3.59 10*3/uL 1.99-6.95                 



             x10^3(ANC) (test                                        



             code = 5372697631)                                        

 

             IMM GRAN x10^3 <0.03        0.00-0.06                 



             (test code =                                        



             0563146933)                                         

 

             LYMPH x10^3 (test 2.37 10*3/uL 1.09-3.23                 



             code = 731-0)                                        

 

             MONO x10^3 (test 0.50 10*3/uL 0.36-1.02                 



             code = 742-7)                                        

 

             EOS x10^3 (test 0.22 10*3/uL 0.06-0.53                 



             code = 711-2)                                        

 

             BASO x10^3 (test <0.03        0.01-0.09                 



             code = 704-7)                                        



[facility] GLUCOSE (AUTOMATED)2022 16:50:23





             Test Item    Value        Reference Range Interpretation Comments

 

             POCT GLU (test code = 0479762166) 304 mg/dL           H      

      

 

             Lab Interpretation (test code = Abnormal                           

    



             51215-9)                                            



[facility] GLUCOSE (AUTOMATED)2022 12:58:13





             Test Item    Value        Reference Range Interpretation Comments

 

             POCT GLU (test code = 3036708700) 364 mg/dL           H      

      

 

             Lab Interpretation (test code = Abnormal                           

    



             71228-6)                                            



[facility] GLUCOSE (AUTOMATED)2022 09:42:05





             Test Item    Value        Reference Range Interpretation Comments

 

             POCT GLU (test code = 6803339896) 369 mg/dL           H      

      

 

             Lab Interpretation (test code = Abnormal                           

    



             65573-5)                                            



[facility] GLUCOSE (AUTOMATED)2022 05:09:33





             Test Item    Value        Reference Range Interpretation Comments

 

             POCT GLU (test code = 6908164661) 332 mg/dL           H      

      

 

             Lab Interpretation (test code = Abnormal                           

    



             09706-5)                                            



[facility] GLUCOSE (AUTOMATED)2022 01:27:31





             Test Item    Value        Reference Range Interpretation Comments

 

             POCT GLU (test code = 0512927258) 349 mg/dL           H      

      

 

             Lab Interpretation (test code = Abnormal                           

    



             60542-9)                                            



[facility] GLUCOSE (AUTOMATED)2022 21:42:26





             Test Item    Value        Reference Range Interpretation Comments

 

             POCT GLU (test code = 6570350496) 372 mg/dL           H      

      

 

             Lab Interpretation (test code = Abnormal                           

    



             18776-3)                                            



[facility] GLUCOSE (AUTOMATED)2022 17:17:16





             Test Item    Value        Reference Range Interpretation Comments

 

             POCT GLU (test code = 6316944877) 329 mg/dL           H      

      

 

             Lab Interpretation (test code = Abnormal                           

    



             33242-8)                                            



[facility] GLUCOSE (AUTOMATED)2022 14:09:34





             Test Item    Value        Reference Range Interpretation Comments

 

             POCT GLU (test code = 6164212867) 350 mg/dL           H      

      

 

             Lab Interpretation (test code = Abnormal                           

    



             42096-4)                                            



[facility] GLUCOSE (AUTOMATED)2022 09:59:20





             Test Item    Value        Reference Range Interpretation Comments

 

             POCT GLU (test code = 5802656176) 342 mg/dL           H      

      

 

             Lab Interpretation (test code = Abnormal                           

    



             49579-2)                                            



[facility] GLUCOSE (AUTOMATED)2022 01:46:05





             Test Item    Value        Reference Range Interpretation Comments

 

             POCT GLU (test code = 8577125276) 318 mg/dL           H      

      

 

             Lab Interpretation (test code = Abnormal                           

    



             82995-7)                                            



[facility] GLUCOSE (AUTOMATED)2022 21:25:33





             Test Item    Value        Reference Range Interpretation Comments

 

             POCT GLU (test code = 2643106720) 212 mg/dL           H      

      

 

             Lab Interpretation (test code = Abnormal                           

    



             09781-6)                                            



[facility] GLUCOSE (AUTOMATED)2022 16:27:52





             Test Item    Value        Reference Range Interpretation Comments

 

             POCT GLU (test code = 4273389948) 226 mg/dL           H      

      

 

             Lab Interpretation (test code = Abnormal                           

    



             07999-2)                                            



[facility] GLUCOSE (AUTOMATED)2022 15:37:46





             Test Item    Value        Reference Range Interpretation Comments

 

             POCT GLU (test code = 6958458645) 204 mg/dL           H      

      

 

             Lab Interpretation (test code = Abnormal                           

    



             69075-2)                                            



[facility] GLUCOSE (AUTOMATED)2022 14:39:25





             Test Item    Value        Reference Range Interpretation Comments

 

             POCT GLU (test code = 2112641035) 185 mg/dL           H      

      

 

             Lab Interpretation (test code = Abnormal                           

    



             36221-1)                                            



[facility] Metabolic Panel (Na, K, Cl, CO2, 
Glucose, BUN, Creatinine, Ca)2022 14:07:03





             Test Item    Value        Reference Range Interpretation Comments

 

             NA (test code = 137 mmol/L   135-145                   



             6631995940)                                         

 

             K (test code = 4.6 mmol/L   3.5-5.0                   



             7939460923)                                         

 

             CL (test code = 111 mmol/L          H            



             5490467738)                                         

 

             CO2 TOTAL (test code = 22 mmol/L    23-31        L            



             2533089112)                                         

 

             AGAP (test code =              2-16                      



             1583423654)                                         

 

             BUN (test code = 12 mg/dL     7-23                      



             4456368286)                                         

 

             GLUCOSE (test code = 181 mg/dL           H            



             5484189178)                                         

 

             CREATININE (test code = 0.70 mg/dL   0.60-1.25                 



             3652767979)                                         

 

             CALCIUM (test code = 8.3 mg/dL    8.6-10.6     L            



             8724971090)                                         

 

             eGFR (test code =              mL/min/1.73m2              



             6435582144)                                         

 

             MAL (test code = MAL) Association of                           



                          Glomerular Filtration                           



                          Rate (GFR) and Staging                           



                          of Kidney Disease*                           



                          +---------------------                           



                          --+-------------------                           



                          --+-------------------                           



                          ------+| GFR                           



                          (mL/min/1.73 m2) ?|                           



                          With Kidney Damage ?|                           



                          ?Without Kidney                           



                          Damage+---------------                           



                          --------+-------------                           



                          --------+-------------                           



                          ------------+| ?>90 ?                           



                          ? ? ? ? ? ? ? ?|                           



                          ?Stage one ? ? ? ? ?|                           



                          ? Normal ? ? ? ? ? ? ?                           



                          ?+--------------------                           



                          ---+------------------                           



                          ---+------------------                           



                          -------+| ?60-89 ? ? ?                           



                          ? ? ? ? ?| ?Stage two                           



                          ? ? ? ? ?| ? Decreased                           



                          GFR ? ? ? ?                            



                          +---------------------                           



                          --+-------------------                           



                          --+-------------------                           



                          ------+| ?30-59 ? ? ?                           



                          ? ? ? ? ?| ?Stage                           



                          three ? ? ? ?| ? Stage                           



                          three ? ? ? ? ?                           



                          +---------------------                           



                          --+-------------------                           



                          --+-------------------                           



                          ------+| ?15-29 ? ? ?                           



                          ? ? ? ? ?| ?Stage four                           



                          ? ? ? ? | ? Stage four                           



                          ? ? ? ? ?                              



                          ?+--------------------                           



                          ---+------------------                           



                          ---+------------------                           



                          -------+| ?<15 (or                           



                          dialysis) ? ?| ?Stage                           



                          five ? ? ? ? | ? Stage                           



                          five ? ? ? ? ?                           



                          ?+--------------------                           



                          ---+------------------                           



                          ---+------------------                           



                          -------+ *Each stage                           



                          assumes the associated                           



                          GFR level has been in                           



                          effect for at least                           



                          three months. ?Stages                           



                          1 to 5, with or                           



                          without kidney                           



                          disease, indicate                           



                          chronic kidney                           



                          disease. Notes:                           



                          Determination of                           



                          stages one and two                           



                          (with eGFR                             



                          >59mL/min/1.73 m2)                           



                          requires estimation of                           



                          kidney damage for at                           



                          least three months as                           



                          defined by structural                           



                          or functional                           



                          abnormalities of the                           



                          kidney, manifested by                           



                          either:Pathological                           



                          abnormalities or                           



                          Markers of kidney                           



                          damage (including                           



                          abnormalities in the                           



                          composition of the                           



                          blood or urine or                           



                          abnormalities in                           



                          imaging tests).                           

 

             Lab Interpretation Abnormal                               



             (test code = 41366-4)                                        



[facility] GLUCOSE (AUTOMATED)2022 13:45:48





             Test Item    Value        Reference Range Interpretation Comments

 

             POCT GLU (test code = 8861285963) 170 mg/dL           H      

      

 

             Lab Interpretation (test code = Abnormal                           

    



             60649-6)                                            



[facility] GLUCOSE (AUTOMATED)2022 12:28:12





             Test Item    Value        Reference Range Interpretation Comments

 

             POCT GLU (test code = 0861527582) 109 mg/dL                  

      

 

             Lab Interpretation (test code = Normal                             

    



             41789-1)                                            



[facility] GLUCOSE (AUTOMATED)2022 11:25:37





             Test Item    Value        Reference Range Interpretation Comments

 

             POCT GLU (test code = 2139561120) 134 mg/dL           H      

      

 

             Lab Interpretation (test code = Abnormal                           

    



             16215-6)                                            



[facility]Na, K, Cl, CO2, 
Glucose, BUN, Creatinine, Ca)2022 10:50:17





             Test Item    Value        Reference Range Interpretation Comments

 

             NA (test code = 138 mmol/L   135-145                   



             8326379908)                                         

 

             K (test code = 5.1 mmol/L   3.5-5.0      H            Slight



             9108481446)                                         hemolysis

 

             CL (test code = 110 mmol/L          H            



             4080497775)                                         

 

             CO2 TOTAL (test code 23 mmol/L    23-31                     



             = 7160042426)                                        

 

             AGAP (test code =              2-16                      



             3451007818)                                         

 

             BUN (test code = 13 mg/dL     7-23                      Slight



             8004429067)                                         hemolysis

 

             GLUCOSE (test code = 131 mg/dL           H            



             5498855706)                                         

 

             CREATININE (test code 0.73 mg/dL   0.60-1.25                 



             = 6142441760)                                        

 

             CALCIUM (test code = 8.9 mg/dL    8.6-10.6                  



             7649075231)                                         

 

             eGFR (test code =              mL/min/1.73m2              



             0437740939)                                         

 

             MAL (test code = MAL) Association of                           



                          Glomerular                             



                          Filtration Rate                           



                          (GFR) and Staging                           



                          of Kidney Disease*                           



                          +------------------                           



                          -----+-------------                           



                          --------+----------                           



                          ---------------+|                           



                          GFR (mL/min/1.73                           



                          m2) ?| With Kidney                           



                          Damage ?| ?Without                           



                          Kidney                                 



                          Damage+------------                           



                          -----------+-------                           



                          --------------+----                           



                          -------------------                           



                          --+| ?>90 ? ? ? ? ?                           



                          ? ? ? ?| ?Stage one                           



                          ? ? ? ? ?| ? Normal                           



                          ? ? ? ? ? ? ?                           



                          ?+-----------------                           



                          ------+------------                           



                          ---------+---------                           



                          ----------------+|                           



                          ?60-89 ? ? ? ? ? ?                           



                          ? ?| ?Stage two ? ?                           



                          ? ? ?| ? Decreased                           



                          GFR ? ? ? ?                            



                          +------------------                           



                          -----+-------------                           



                          --------+----------                           



                          ---------------+|                           



                          ?30-59 ? ? ? ? ? ?                           



                          ? ?| ?Stage three ?                           



                          ? ? ?| ? Stage                           



                          three ? ? ? ? ?                           



                          +------------------                           



                          -----+-------------                           



                          --------+----------                           



                          ---------------+|                           



                          ?15-29 ? ? ? ? ? ?                           



                          ? ?| ?Stage four ?                           



                          ? ? ? | ? Stage                           



                          four ? ? ? ? ?                           



                          ?+-----------------                           



                          ------+------------                           



                          ---------+---------                           



                          ----------------+|                           



                          ?<15 (or dialysis)                           



                          ? ?| ?Stage five ?                           



                          ? ? ? | ? Stage                           



                          five ? ? ? ? ?                           



                          ?+-----------------                           



                          ------+------------                           



                          ---------+---------                           



                          ----------------+                           



                          *Each stage assumes                           



                          the associated GFR                           



                          level has been in                           



                          effect for at least                           



                          three months.                           



                          ?Stages 1 to 5,                           



                          with or without                           



                          kidney disease,                           



                          indicate chronic                           



                          kidney disease.                           



                          Notes:                                 



                          Determination of                           



                          stages one and two                           



                          (with eGFR                             



                          >59mL/min/1.73 m2)                           



                          requires estimation                           



                          of kidney damage                           



                          for at least three                           



                          months as defined                           



                          by structural or                           



                          functional                             



                          abnormalities of                           



                          the kidney,                            



                          manifested by                           



                          either:Pathological                           



                          abnormalities or                           



                          Markers of kidney                           



                          damage (including                           



                          abnormalities in                           



                          the composition of                           



                          the blood or urine                           



                          or abnormalities in                           



                          imaging tests).                           

 

             Lab Interpretation Abnormal                               



             (test code = 07860-8)                                        



[facility] GLUCOSE (AUTOMATED)2022 10:35:11





             Test Item    Value        Reference Range Interpretation Comments

 

             POCT GLU (test code = 8820793083) 125 mg/dL           H      

      

 

             Lab Interpretation (test code = Abnormal                           

    



             92371-9)                                            



[facility] GLUCOSE (AUTOMATED)2022 09:31:05





             Test Item    Value        Reference Range Interpretation Comments

 

             POCT GLU (test code = 3644104132) 137 mg/dL           H      

      

 

             Lab Interpretation (test code = Abnormal                           

    



             74431-1)                                            



[facility] GLUCOSE (AUTOMATED)2022 08:28:48





             Test Item    Value        Reference Range Interpretation Comments

 

             POCT GLU (test code = 1013763251) 168 mg/dL           H      

      

 

             Lab Interpretation (test code = Abnormal                           

    



             98130-0)                                            



[facility] GLUCOSE (AUTOMATED)2022 07:26:17





             Test Item    Value        Reference Range Interpretation Comments

 

             POCT GLU (test code = 3386226950) 165 mg/dL           H      

      

 

             Lab Interpretation (test code = Abnormal                           

    



             03064-7)                                            



[facility] Metabolic Panel (Na, K, Cl, CO2, 
Glucose, BUN, Creatinine, Ca)2022 07:18:59





             Test Item    Value        Reference Range Interpretation Comments

 

             NA (test code = 136 mmol/L   135-145                   



             4845755342)                                         

 

             K (test code = 5.1 mmol/L   3.5-5.0      H            



             4187864108)                                         

 

             CL (test code = 108 mmol/L                       



             1281089526)                                         

 

             CO2 TOTAL (test code = 24 mmol/L    23-31                     



             2231375257)                                         

 

             AGAP (test code =              2-16                      



             3388446587)                                         

 

             BUN (test code = 14 mg/dL     7-23                      



             2206929161)                                         

 

             GLUCOSE (test code = 202 mg/dL           H            



             5785308355)                                         

 

             CREATININE (test code = 0.79 mg/dL   0.60-1.25                 



             6384116608)                                         

 

             CALCIUM (test code = 8.9 mg/dL    8.6-10.6                  



             8681149469)                                         

 

             eGFR (test code =              mL/min/1.73m2              



             9455458044)                                         

 

             MAL (test code = MAL) Association of                           



                          Glomerular Filtration                           



                          Rate (GFR) and Staging                           



                          of Kidney Disease*                           



                          +---------------------                           



                          --+-------------------                           



                          --+-------------------                           



                          ------+| GFR                           



                          (mL/min/1.73 m2) ?|                           



                          With Kidney Damage ?|                           



                          ?Without Kidney                           



                          Damage+---------------                           



                          --------+-------------                           



                          --------+-------------                           



                          ------------+| ?>90 ?                           



                          ? ? ? ? ? ? ? ?|                           



                          ?Stage one ? ? ? ? ?|                           



                          ? Normal ? ? ? ? ? ? ?                           



                          ?+--------------------                           



                          ---+------------------                           



                          ---+------------------                           



                          -------+| ?60-89 ? ? ?                           



                          ? ? ? ? ?| ?Stage two                           



                          ? ? ? ? ?| ? Decreased                           



                          GFR ? ? ? ?                            



                          +---------------------                           



                          --+-------------------                           



                          --+-------------------                           



                          ------+| ?30-59 ? ? ?                           



                          ? ? ? ? ?| ?Stage                           



                          three ? ? ? ?| ? Stage                           



                          three ? ? ? ? ?                           



                          +---------------------                           



                          --+-------------------                           



                          --+-------------------                           



                          ------+| ?15-29 ? ? ?                           



                          ? ? ? ? ?| ?Stage four                           



                          ? ? ? ? | ? Stage four                           



                          ? ? ? ? ?                              



                          ?+--------------------                           



                          ---+------------------                           



                          ---+------------------                           



                          -------+| ?<15 (or                           



                          dialysis) ? ?| ?Stage                           



                          five ? ? ? ? | ? Stage                           



                          five ? ? ? ? ?                           



                          ?+--------------------                           



                          ---+------------------                           



                          ---+------------------                           



                          -------+ *Each stage                           



                          assumes the associated                           



                          GFR level has been in                           



                          effect for at least                           



                          three months. ?Stages                           



                          1 to 5, with or                           



                          without kidney                           



                          disease, indicate                           



                          chronic kidney                           



                          disease. Notes:                           



                          Determination of                           



                          stages one and two                           



                          (with eGFR                             



                          >59mL/min/1.73 m2)                           



                          requires estimation of                           



                          kidney damage for at                           



                          least three months as                           



                          defined by structural                           



                          or functional                           



                          abnormalities of the                           



                          kidney, manifested by                           



                          either:Pathological                           



                          abnormalities or                           



                          Markers of kidney                           



                          damage (including                           



                          abnormalities in the                           



                          composition of the                           



                          blood or urine or                           



                          abnormalities in                           



                          imaging tests).                           

 

             Lab Interpretation Abnormal                               



             (test code = 34248-6)                                        



[facility] [id number]-[id number] 06:57:08





             Test Item    Value        Reference Range Interpretation Comments

 

             BOH (test code = <0.1         mmol/L                    



             2052663658)                                         

 

             MAL (test code = Normal Ranges: ? ?                           



             MAL)         Nonfasting ? ? ? ? ? Less                           



                          than 0.1 mmol/L ? ?                           



                          Overnight Fast ? ? ? Less                           



                          than 0.4 mmol/L ? ? Fasting                           



                          (1-2 weeks) ?6-8 mmol/L Test                          

 



                          developed and                           



                          characteristics determined                           



                          by [facility].                          

 



[facility] GLUCOSE (AUTOMATED)2022 06:24:07





             Test Item    Value        Reference Range Interpretation Comments

 

             POCT GLU (test code = 9209644571) 212 mg/dL           H      

      

 

             Lab Interpretation (test code = Abnormal                           

    



             85933-0)                                            



[facility] GLUCOSE (AUTOMATED)2022 05:28:19





             Test Item    Value        Reference Range Interpretation Comments

 

             POCT GLU (test code = 7709147252) 239 mg/dL           H      

      

 

             Lab Interpretation (test code = Abnormal                           

    



             73614-5)                                            



[facility] GLUCOSE (AUTOMATED)2022 04:29:31





             Test Item    Value        Reference Range Interpretation Comments

 

             POCT GLU (test code = 8020372000) 251 mg/dL           H      

      

 

             Lab Interpretation (test code = Abnormal                           

    



             73171-6)                                            



[facility] Metabolic Panel (Na, K, Cl, CO2, 
Glucose, BUN, Creatinine, Ca)2022 03:49:55





             Test Item    Value        Reference Range Interpretation Comments

 

             NA (test code = 141 mmol/L   135-145                   



             7289445583)                                         

 

             K (test code = 4.2 mmol/L   3.5-5.0                   



             5202689910)                                         

 

             CL (test code = 108 mmol/L                       



             0819973733)                                         

 

             CO2 TOTAL (test code = 26 mmol/L    23-31                     



             4500513037)                                         

 

             AGAP (test code =              2-16                      



             9786026359)                                         

 

             BUN (test code = 14 mg/dL     7-23                      



             4625105940)                                         

 

             GLUCOSE (test code = 164 mg/dL           H            



             5031179173)                                         

 

             CREATININE (test code = 0.84 mg/dL   0.60-1.25                 



             7096970230)                                         

 

             CALCIUM (test code = 9.3 mg/dL    8.6-10.6                  



             0290353101)                                         

 

             eGFR (test code =              mL/min/1.73m2              



             2478415447)                                         

 

             MAL (test code = MAL) Association of                           



                          Glomerular Filtration                           



                          Rate (GFR) and Staging                           



                          of Kidney Disease*                           



                          +---------------------                           



                          --+-------------------                           



                          --+-------------------                           



                          ------+| GFR                           



                          (mL/min/1.73 m2) ?|                           



                          With Kidney Damage ?|                           



                          ?Without Kidney                           



                          Damage+---------------                           



                          --------+-------------                           



                          --------+-------------                           



                          ------------+| ?>90 ?                           



                          ? ? ? ? ? ? ? ?|                           



                          ?Stage one ? ? ? ? ?|                           



                          ? Normal ? ? ? ? ? ? ?                           



                          ?+--------------------                           



                          ---+------------------                           



                          ---+------------------                           



                          -------+| ?60-89 ? ? ?                           



                          ? ? ? ? ?| ?Stage two                           



                          ? ? ? ? ?| ? Decreased                           



                          GFR ? ? ? ?                            



                          +---------------------                           



                          --+-------------------                           



                          --+-------------------                           



                          ------+| ?30-59 ? ? ?                           



                          ? ? ? ? ?| ?Stage                           



                          three ? ? ? ?| ? Stage                           



                          three ? ? ? ? ?                           



                          +---------------------                           



                          --+-------------------                           



                          --+-------------------                           



                          ------+| ?15-29 ? ? ?                           



                          ? ? ? ? ?| ?Stage four                           



                          ? ? ? ? | ? Stage four                           



                          ? ? ? ? ?                              



                          ?+--------------------                           



                          ---+------------------                           



                          ---+------------------                           



                          -------+| ?<15 (or                           



                          dialysis) ? ?| ?Stage                           



                          five ? ? ? ? | ? Stage                           



                          five ? ? ? ? ?                           



                          ?+--------------------                           



                          ---+------------------                           



                          ---+------------------                           



                          -------+ *Each stage                           



                          assumes the associated                           



                          GFR level has been in                           



                          effect for at least                           



                          three months. ?Stages                           



                          1 to 5, with or                           



                          without kidney                           



                          disease, indicate                           



                          chronic kidney                           



                          disease. Notes:                           



                          Determination of                           



                          stages one and two                           



                          (with eGFR                             



                          >59mL/min/1.73 m2)                           



                          requires estimation of                           



                          kidney damage for at                           



                          least three months as                           



                          defined by structural                           



                          or functional                           



                          abnormalities of the                           



                          kidney, manifested by                           



                          either:Pathological                           



                          abnormalities or                           



                          Markers of kidney                           



                          damage (including                           



                          abnormalities in the                           



                          composition of the                           



                          blood or urine or                           



                          abnormalities in                           



                          imaging tests).                           

 

             Lab Interpretation Abnormal                               



             (test code = 97123-8)                                        



[facility] GLUCOSE (AUTOMATED)2022 03:20:15





             Test Item    Value        Reference Range Interpretation Comments

 

             POCT GLU (test code = 3693856606) 174 mg/dL           H      

      

 

             Lab Interpretation (test code = Abnormal                           

    



             32252-0)                                            



[facility] GLUCOSE (AUTOMATED)2022 02:25:54





             Test Item    Value        Reference Range Interpretation Comments

 

             POCT GLU (test code = 8178350437) 158 mg/dL           H      

      

 

             Lab Interpretation (test code = Abnormal                           

    



             63557-0)                                            



[facility] GLUCOSE (AUTOMATED)2022 01:37:09





             Test Item    Value        Reference Range Interpretation Comments

 

             POCT GLU (test code = 4362837835) 219 mg/dL           H      

      

 

             Lab Interpretation (test code = Abnormal                           

    



             25547-0)                                            



[facility] GLUCOSE (AUTOMATED)2022 00:19:48





             Test Item    Value        Reference Range Interpretation Comments

 

             POCT GLU (test code = 2519267218) 345 mg/dL           H      

      

 

             Lab Interpretation (test code = Abnormal                           

    



             53810-7)                                            



[facility] GLUCOSE (AUTOMATED)2022 22:59:57





             Test Item    Value        Reference Range Interpretation Comments

 

             POCT GLU (test code = 2076743935) 318 mg/dL           H      

      

 

             Lab Interpretation (test code = Abnormal                           

    



             65949-7)                                            



[facility] (Na, K, Cl, CO2, 
Glucose, BUN, Creatinine, Ca)2022 22:41:00





             Test Item    Value        Reference Range Interpretation Comments

 

             NA (test code = 140 mmol/L   135-145                   



             2153504635)                                         

 

             K (test code = 3.1 mmol/L   3.5-5.0      L            



             2883806091)                                         

 

             CL (test code = 116 mmol/L          H            



             5296842781)                                         

 

             CO2 TOTAL (test code = 21 mmol/L    23-31        L            



             4454216567)                                         

 

             AGAP (test code =              2-16                      



             6441119944)                                         

 

             BUN (test code = 12 mg/dL     7-23                      



             6348539629)                                         

 

             GLUCOSE (test code = 281 mg/dL           H            



             8055613358)                                         

 

             CREATININE (test code = 0.62 mg/dL   0.60-1.25                 



             6638389721)                                         

 

             CALCIUM (test code = 7.2 mg/dL    8.6-10.6     L            



             2854548680)                                         

 

             eGFR (test code =              mL/min/1.73m2              



             9524875632)                                         

 

             MAL (test code = MAL) Association of                           



                          Glomerular Filtration                           



                          Rate (GFR) and Staging                           



                          of Kidney Disease*                           



                          +---------------------                           



                          --+-------------------                           



                          --+-------------------                           



                          ------+| GFR                           



                          (mL/min/1.73 m2) ?|                           



                          With Kidney Damage ?|                           



                          ?Without Kidney                           



                          Damage+---------------                           



                          --------+-------------                           



                          --------+-------------                           



                          ------------+| ?>90 ?                           



                          ? ? ? ? ? ? ? ?|                           



                          ?Stage one ? ? ? ? ?|                           



                          ? Normal ? ? ? ? ? ? ?                           



                          ?+--------------------                           



                          ---+------------------                           



                          ---+------------------                           



                          -------+| ?60-89 ? ? ?                           



                          ? ? ? ? ?| ?Stage two                           



                          ? ? ? ? ?| ? Decreased                           



                          GFR ? ? ? ?                            



                          +---------------------                           



                          --+-------------------                           



                          --+-------------------                           



                          ------+| ?30-59 ? ? ?                           



                          ? ? ? ? ?| ?Stage                           



                          three ? ? ? ?| ? Stage                           



                          three ? ? ? ? ?                           



                          +---------------------                           



                          --+-------------------                           



                          --+-------------------                           



                          ------+| ?15-29 ? ? ?                           



                          ? ? ? ? ?| ?Stage four                           



                          ? ? ? ? | ? Stage four                           



                          ? ? ? ? ?                              



                          ?+--------------------                           



                          ---+------------------                           



                          ---+------------------                           



                          -------+| ?<15 (or                           



                          dialysis) ? ?| ?Stage                           



                          five ? ? ? ? | ? Stage                           



                          five ? ? ? ? ?                           



                          ?+--------------------                           



                          ---+------------------                           



                          ---+------------------                           



                          -------+ *Each stage                           



                          assumes the associated                           



                          GFR level has been in                           



                          effect for at least                           



                          three months. ?Stages                           



                          1 to 5, with or                           



                          without kidney                           



                          disease, indicate                           



                          chronic kidney                           



                          disease. Notes:                           



                          Determination of                           



                          stages one and two                           



                          (with eGFR                             



                          >59mL/min/1.73 m2)                           



                          requires estimation of                           



                          kidney damage for at                           



                          least three months as                           



                          defined by structural                           



                          or functional                           



                          abnormalities of the                           



                          kidney, manifested by                           



                          either:Pathological                           



                          abnormalities or                           



                          Markers of kidney                           



                          damage (including                           



                          abnormalities in the                           



                          composition of the                           



                          blood or urine or                           



                          abnormalities in                           



                          imaging tests).                           

 

             Lab Interpretation Abnormal                               



             (test code = 38718-1)                                        



[facility] GLUCOSE (AUTOMATED)2022 21:53:29





             Test Item    Value        Reference Range Interpretation Comments

 

             POCT GLU (test code = 8161540824) 368 mg/dL           H      

      

 

             Lab Interpretation (test code = Abnormal                           

    



             89834-4)                                            



[facility] GLUCOSE (AUTOMATED)2022 20:41:51





             Test Item    Value        Reference Range Interpretation Comments

 

             POCT GLU (test code = 3608313921) 458 mg/dL           HH     

      

 

             Lab Interpretation (test code = Abnormal                           

    



             87301-8)                                            



[facility] GLUCOSE (AUTOMATED)2022 19:00:13





             Test Item    Value        Reference Range Interpretation Comments

 

             POCT GLU (test code = 2346749791) 369 mg/dL           H      

      

 

             Lab Interpretation (test code = Abnormal                           

    



             44656-2)                                            



[facility] Metabolic Panel (Na, K, Cl, CO2, 
Glucose, BUN, Creatinine, Ca)2022 18:32:29





             Test Item    Value        Reference Range Interpretation Comments

 

             NA (test code = 145 mmol/L   135-145                   



             [id number])                                         

 

             K (test code = 4.8 mmol/L   3.5-5.0                   Slight



             019851)                                         hemolysis

 

             CL (test code = 112 mmol/L          H            



             5137568688)                                         

 

             CO2 TOTAL (test code 26 mmol/L    23-31                     



             = 4675691157)                                        

 

             AGAP (test code =              2-16                      



             0334464487)                                         

 

             BUN (test code = 16 mg/dL     7-23                      Slight



             0996061585)                                         hemolysis

 

             GLUCOSE (test code = 282 mg/dL           H            



             8081985560)                                         

 

             CREATININE (test code 0.83 mg/dL   0.60-1.25                 



             = 0629791644)                                        

 

             CALCIUM (test code = 10.0 mg/dL   8.6-10.6                  



             4678807776)                                         

 

             eGFR (test code =              mL/min/1.73m2              



             8161311054)                                         

 

             MAL (test code = MAL) Association of                           



                          Glomerular                             



                          Filtration Rate                           



                          (GFR) and Staging                           



                          of Kidney Disease*                           



                          +------------------                           



                          -----+-------------                           



                          --------+----------                           



                          ---------------+|                           



                          GFR (mL/min/1.73                           



                          m2) ?| With Kidney                           



                          Damage ?| ?Without                           



                          Kidney                                 



                          Damage+------------                           



                          -----------+-------                           



                          --------------+----                           



                          -------------------                           



                          --+| ?>90 ? ? ? ? ?                           



                          ? ? ? ?| ?Stage one                           



                          ? ? ? ? ?| ? Normal                           



                          ? ? ? ? ? ? ?                           



                          ?+-----------------                           



                          ------+------------                           



                          ---------+---------                           



                          ----------------+|                           



                          ?60-89 ? ? ? ? ? ?                           



                          ? ?| ?Stage two ? ?                           



                          ? ? ?| ? Decreased                           



                          GFR ? ? ? ?                            



                          +------------------                           



                          -----+-------------                           



                          --------+----------                           



                          ---------------+|                           



                          ?30-59 ? ? ? ? ? ?                           



                          ? ?| ?Stage three ?                           



                          ? ? ?| ? Stage                           



                          three ? ? ? ? ?                           



                          +------------------                           



                          -----+-------------                           



                          --------+----------                           



                          ---------------+|                           



                          ?15-29 ? ? ? ? ? ?                           



                          ? ?| ?Stage four ?                           



                          ? ? ? | ? Stage                           



                          four ? ? ? ? ?                           



                          ?+-----------------                           



                          ------+------------                           



                          ---------+---------                           



                          ----------------+|                           



                          ?<15 (or dialysis)                           



                          ? ?| ?Stage five ?                           



                          ? ? ? | ? Stage                           



                          five ? ? ? ? ?                           



                          ?+-----------------                           



                          ------+------------                           



                          ---------+---------                           



                          ----------------+                           



                          *Each stage assumes                           



                          the associated GFR                           



                          level has been in                           



                          effect for at least                           



                          three months.                           



                          ?Stages 1 to 5,                           



                          with or without                           



                          kidney disease,                           



                          indicate chronic                           



                          kidney disease.                           



                          Notes:                                 



                          Determination of                           



                          stages one and two                           



                          (with eGFR                             



                          >59mL/min/1.73 m2)                           



                          requires estimation                           



                          of kidney damage                           



                          for at least three                           



                          months as defined                           



                          by structural or                           



                          functional                             



                          abnormalities of                           



                          the kidney,                            



                          manifested by                           



                          either:Pathological                           



                          abnormalities or                           



                          Markers of kidney                           



                          damage (including                           



                          abnormalities in                           



                          the composition of                           



                          the blood or urine                           



                          or abnormalities in                           



                          imaging tests).                           

 

             Lab Interpretation Abnormal                               



             (test code = 37450-8)                                        



[facility] GLUCOSE (AUTOMATED)2022 17:45:09





             Test Item    Value        Reference Range Interpretation Comments

 

             POCT GLU (test code = 2070661260) 284 mg/dL           H      

      

 

             Lab Interpretation (test code = Abnormal                           

    



             20667-3)                                            



[facility]





             Test Item    Value        Reference Range Interpretation Comments

 

             BOH (test code = 0.9 mmol/L                             



             0150973646)                                         

 

             MAL (test code = Normal Ranges: ? ?                           



             MAL)         Nonfasting ? ? ? ? ? Less                           



                          than 0.1 mmol/L ? ?                           



                          Overnight Fast ? ? ? Less                           



                          than 0.4 mmol/L ? ? Fasting                           



                          (1-2 weeks) ?6-8 mmol/L Test                          

 



                          developed and                           



                          characteristics determined                           



                          by [facility].                          

 



[facility] GLUCOSE (AUTOMATED)2022 16:33:41





             Test Item    Value        Reference Range Interpretation Comments

 

             POCT GLU (test code = 5930002635) 266 mg/dL           H      

      

 

             Lab Interpretation (test code = Abnormal                           

    



             55680-9)                                            



[facility] GLUCOSE (AUTOMATED)2022 15:30:41





             Test Item    Value        Reference Range Interpretation Comments

 

             POCT GLU (test code = 4113873363) 294 mg/dL           H      

      

 

             Lab Interpretation (test code = Abnormal                           

    



             64503-5)                                            



[facility] GLUCOSE (AUTOMATED)2022 14:23:40





             Test Item    Value        Reference Range Interpretation Comments

 

             POCT GLU (test code = 0858238034) 511 mg/dL           HH     

      

 

             Lab Interpretation (test code = Abnormal                           

    



             49942-1)                                            



[facility] WITHOUT [id number] 13:55:53





             Test Item    Value        Reference Range Interpretation Comments

 

             WBC (test code = 6690-2)              See_Comment  H             [A

utomated message]



                                                                 The system [employer]



                                                                 generated this



                                                                 result transmit

huma



                                                                 reference range

:



                                                                 4.20 - 10.70



                                                                 10*3/?L. The



                                                                 reference range

 was



                                                                 not used to



                                                                 interpret this



                                                                 result as



                                                                 normal/abnormal

.

 

             RBC (test code = 789-8)              See_Comment  H             [Au

tomated message]



                                                                 The system [employer]



                                                                 generated this



                                                                 result transmit

huma



                                                                 reference range

:



                                                                 4.26 - 5.52 10*

6/?L.



                                                                 The reference r

zhen



                                                                 was not used to



                                                                 interpret this



                                                                 result as



                                                                 normal/abnormal

.

 

             HGB (test code = 718-7) 16.2 g/dL    12.2-16.4                 

 

             HCT (test code = 4544-3) 48.4 %       38.4-49.3                 

 

             MCH (test code = 785-6) 29.1 pg      26.1-32.7                 

 

             MCV (test code = 787-2) 86.9 fL      81.7-95.6                 

 

             MCHC (test code = 786-4) 33.5 g/dL    31.2-35.0                 

 

             PLT (test code = 777-3)              See_Comment  H             [Au

tomated message]



                                                                 The system [employer]



                                                                 generated this



                                                                 result transmit

huma



                                                                 reference range

: 150



                                                                 - 328 10*3/?L. 

The



                                                                 reference range

 was



                                                                 not used to



                                                                 interpret this



                                                                 result as



                                                                 normal/abnormal

.

 

             MPV (test code = 11.0 fL      9.8-13.0                  



             22242-9)                                            

 

             RDW-CV (test code = 14.2 %       12.1-15.4                 



             788-0)                                              

 

             RDW-SD (test code = 44.8 fL      38.5-51.6                 



             72550-3)                                            

 

             NRBC x10^3 (test code = <0.01        See_Comment                [Au

tomated message]



             3260909579)                                         The system [employer]



                                                                 generated this



                                                                 result transmit

huma



                                                                 reference range

:



                                                                 10*3/?L. The



                                                                 reference range

 was



                                                                 not used to



                                                                 interpret this



                                                                 result as



                                                                 normal/abnormal

.

 

             NRBC/100 WBC (test code              See_Comment                [Au

tomated message]



             = 4897506402)                                        The system [employer]



                                                                 generated this



                                                                 result transmit

huma



                                                                 reference range

: 0.0



                                                                 - 10.0 /100 WBC

s.



                                                                 The reference r

zhen



                                                                 was not used to



                                                                 interpret this



                                                                 result as



                                                                 normal/abnormal

.

 

             IPF % (test code =                                        



             0540508123)                                         

 

             Lab Interpretation (test Abnormal                               



             code = 73118-3)                                        



[facility] GLUCOSE (AUTOMATED)2022 13:34:21





             Test Item    Value        Reference Range Interpretation Comments

 

             POCT GLU (test code = 4477681964) 538 mg/dL           HH     

      

 

             Lab Interpretation (test code = Abnormal                           

    



             16883-0)                                            



[facility] GLUCOSE (AUTOMATED)2022 13:30:43





             Test Item    Value        Reference Range Interpretation Comments

 

             POCT GLU (test code = 3780248971) >600                HH     

      

 

             Lab Interpretation (test code = Abnormal                           

    



             75715-3)                                            



[facility] GLUCOSE (AUTOMATED)2022 13:30:43





             Test Item    Value        Reference Range Interpretation Comments

 

             POCT GLU (test code = 7865844111) >600                HH     

      

 

             Lab Interpretation (test code = Abnormal                           

    



             31352-0)                                            



[facility] GLUCOSE (AUTOMATED)2022 13:30:43





             Test Item    Value        Reference Range Interpretation Comments

 

             POCT GLU (test code = >600                HH           Notifi

ed Provider



             [id number])                                         

 

             Lab Interpretation (test Abnormal                               



             code = 61130-5)                                        



[facility] GLUCOSE (AUTOMATED)2022 13:30:43





             Test Item    Value        Reference Range Interpretation Comments

 

             POCT GLU (test code = 8400376974) >600                HH     

      

 

             Lab Interpretation (test code = Abnormal                           

    



             76289-3)                                            



[facility] GLUCOSE (AUTOMATED)2022 13:30:38





             Test Item    Value        Reference Range Interpretation Comments

 

             POCT GLU (test code = 1544848765) >600                HH     

      

 

             Lab Interpretation (test code = Abnormal                           

    



             99038-0)                                            



[facility] GLUCOSE (AUTOMATED)2022 13:30:38





             Test Item    Value        Reference Range Interpretation Comments

 

             POCT GLU (test code = 2050371259) >600                HH     

      

 

             Lab Interpretation (test code = Abnormal                           

    



             08810-6)                                            



[id number]





             Test Item    Value        Reference Range Interpretation Comments

 

             OSMOLALITY (test code =              See_Comment              [Au

tomated message]



             2692-2)                                             The system [employer]



                                                                 generated this 

result



                                                                 transmitted ref

erence



                                                                 range: 278 - 30

5



                                                                 mOsm/kg. The



                                                                 reference range

 was



                                                                 not used to int

erpret



                                                                 this result as



                                                                 normal/abnormal

.

 

             Lab Interpretation (test Abnormal                               



             code = 77706-0)                                        



[facility] Metabolic Panel (Na, K, Cl, CO2, 
Glucose, BUN, Creatinine, Ca)2022 12:23:35





             Test Item    Value        Reference Range Interpretation Comments

 

             NA (test code = 145 mmol/L   135-145                   



             3124626179)                                         

 

             K (test code = 4.4 mmol/L   3.5-5.0                   



             2383654408)                                         

 

             CL (test code = 109 mmol/L          H            



             4961628043)                                         

 

             CO2 TOTAL (test code = 21 mmol/L    23-31        L            



             8312474156)                                         

 

             AGAP (test code =              2-16                      



             4060118158)                                         

 

             BUN (test code = 15 mg/dL     7-23                      



             1889876835)                                         

 

             GLUCOSE (test code = 753 mg/dL           HH           



             7070408514)                                         

 

             CREATININE (test code = 0.79 mg/dL   0.60-1.25                 



             2388395960)                                         

 

             CALCIUM (test code = 10.9 mg/dL   8.6-10.6     H            



             7668773282)                                         

 

             eGFR (test code =              mL/min/1.73m2              



             5028502693)                                         

 

             MAL (test code = MAL) Association of                           



                          Glomerular Filtration                           



                          Rate (GFR) and Staging                           



                          of Kidney Disease*                           



                          +---------------------                           



                          --+-------------------                           



                          --+-------------------                           



                          ------+| GFR                           



                          (mL/min/1.73 m2) ?|                           



                          With Kidney Damage ?|                           



                          ?Without Kidney                           



                          Damage+---------------                           



                          --------+-------------                           



                          --------+-------------                           



                          ------------+| ?>90 ?                           



                          ? ? ? ? ? ? ? ?|                           



                          ?Stage one ? ? ? ? ?|                           



                          ? Normal ? ? ? ? ? ? ?                           



                          ?+--------------------                           



                          ---+------------------                           



                          ---+------------------                           



                          -------+| ?60-89 ? ? ?                           



                          ? ? ? ? ?| ?Stage two                           



                          ? ? ? ? ?| ? Decreased                           



                          GFR ? ? ? ?                            



                          +---------------------                           



                          --+-------------------                           



                          --+-------------------                           



                          ------+| ?30-59 ? ? ?                           



                          ? ? ? ? ?| ?Stage                           



                          three ? ? ? ?| ? Stage                           



                          three ? ? ? ? ?                           



                          +---------------------                           



                          --+-------------------                           



                          --+-------------------                           



                          ------+| ?15-29 ? ? ?                           



                          ? ? ? ? ?| ?Stage four                           



                          ? ? ? ? | ? Stage four                           



                          ? ? ? ? ?                              



                          ?+--------------------                           



                          ---+------------------                           



                          ---+------------------                           



                          -------+| ?<15 (or                           



                          dialysis) ? ?| ?Stage                           



                          five ? ? ? ? | ? Stage                           



                          five ? ? ? ? ?                           



                          ?+--------------------                           



                          ---+------------------                           



                          ---+------------------                           



                          -------+ *Each stage                           



                          assumes the associated                           



                          GFR level has been in                           



                          effect for at least                           



                          three months. ?Stages                           



                          1 to 5, with or                           



                          without kidney                           



                          disease, indicate                           



                          chronic kidney                           



                          disease. Notes:                           



                          Determination of                           



                          stages one and two                           



                          (with eGFR                             



                          >59mL/min/1.73 m2)                           



                          requires estimation of                           



                          kidney damage for at                           



                          least three months as                           



                          defined by structural                           



                          or functional                           



                          abnormalities of the                           



                          kidney, manifested by                           



                          either:Pathological                           



                          abnormalities or                           



                          Markers of kidney                           



                          damage (including                           



                          abnormalities in the                           



                          composition of the                           



                          blood or urine or                           



                          abnormalities in                           



                          imaging tests).                           

 

             Lab Interpretation Abnormal                               



             (test code = 93065-4)                                        



[facility]





             Test Item    Value        Reference Range Interpretation Comments

 

             POCT GLU (test code = 9863148392) 564 mg/dL           HH     

      

 

             Lab Interpretation (test code = Abnormal                           

    



             54918-2)                                            



[id number] 12:05:40





             Test Item    Value        Reference Range Interpretation Comments

 

             MAGNESIUM (test code = 9042757591) 2.5 mg/dL    1.7-2.4      H     

       

 

             Lab Interpretation (test code = Abnormal                           

    



             29851-0)                                            



[facility] 12:05:20





             Test Item    Value        Reference Range Interpretation Comments

 

             MAGNESIUM (test code = 4885218573) 2.5 mg/dL    1.7-2.4      H     

       

 

             Lab Interpretation (test code = Abnormal                           

    



             05490-6)                                            



[facility]02[facility] 12:05:20





             Test Item    Value        Reference Range Interpretation Comments

 

             PHOSPHORUS (test code = 9891623790) 6.2 mg/dL    2.5-5.0      H    

        

 

             Lab Interpretation (test code = Abnormal                           

    



             37629-4)                                            



[facility] LACTIC [id number] 05:53:48





             Test Item    Value        Reference Range Interpretation Comments

 

             PH (test code =              7.32-7.42                 



             4695825085)                                         

 

             PCO2 MARCELINA (test code              See_Comment                [Automa

huma



             = 2037412953)                                        message] The



                                                                 system which



                                                                 generated this



                                                                 result



                                                                 transmitted



                                                                 reference range

:



                                                                 41 - 51 mmHg. T

he



                                                                 reference range



                                                                 was not used to



                                                                 interpret this



                                                                 result as



                                                                 normal/abnormal

.

 

             PO2 MARCELINA (test code =              See_Comment  HH            [Autom

ated



             5763377427)                                         message] The



                                                                 system which



                                                                 generated this



                                                                 result



                                                                 transmitted



                                                                 reference range

:



                                                                 25 - 40 mmHg. T

he



                                                                 reference range



                                                                 was not used to



                                                                 interpret this



                                                                 result as



                                                                 normal/abnormal

.

 

             HCO3 MARCELINA (test code              See_Comment  L             [Automa

huma



             = 0426387055)                                        message] The



                                                                 system which



                                                                 generated this



                                                                 result



                                                                 transmitted



                                                                 reference range

:



                                                                 24 - 28 mEq/L.



                                                                 The reference



                                                                 range was not



                                                                 used to interpr

et



                                                                 this result as



                                                                 normal/abnormal

.

 

             AC VBE(BEAKER) (test              mEq/L                     



             code = 8474370188)                                        

 

             THB MARCELINA (test code = 17.7 g/dL    13.5-18.0                 



             5139052022)                                         

 

             %O2HB MARCELINA (test code 93.8 %       52.0-63.0    H            



             = 0261360799)                                        

 

             %COHB MARCELINA (test code 2.1 %        0.0-1.5      H            



             = 8427960984)                                        

 

             %METHB MARCELINA (test 0.1 %        0.4-1.5      L            



             code = 3321313774)                                        

 

             VOL%O2 MARCELINA (test 23.3 %       6.0-12.0     H            



             code = 2871230762)                                        

 

             NA (test code = 142 mmol/L   135-145                   



             0117891547)                                         

 

             K+ (test code = 4.4 mmol/L   3.5-5.0                   



             8454767955)                                         

 

             AC CA IONZ (test 5.40 mg/dL   4.50-5.30    H            



             code = 0106951276)                                        

 

             GLUCOSE (test code = <20                 LL           



             4314450101)                                         

 

             LACTIC ACID (test 3.37 mmol/L  0.50-2.20    H            



             code = 7893590360)                                        

 

             MAL (test code = *ac gluc                               



             MAL)         unmeasurable                           

 

             Lab Interpretation Abnormal                               



             (test code =                                        



             95223-3)                                            



[facility]08293[facility] 
04:59:53





             Test Item    Value        Reference Range Interpretation Comments

 

             NA (test code = 140 mmol/L   135-145                   



             9663498667)                                         

 

             K (test code = 4.8 mmol/L   3.5-5.0                   



             6863946652)                                         

 

             CL (test code = 100 mmol/L                       



             1242200234)                                         

 

             CO2 TOTAL (test code = 20 mmol/L    23-31        L            



             1358229096)                                         

 

             AGAP (test code =              2-16         H            



             7875544054)                                         

 

             BUN (test code = 14 mg/dL     7-23                      



             3115704195)                                         

 

             GLUCOSE (test code = 1083 mg/dL          HH           



             9433131239)                                         

 

             CREATININE (test code = 0.80 mg/dL   0.60-1.25                 



             1578016203)                                         

 

             TOTAL BILI (test code = 1.0 mg/dL    0.1-1.1                   



             3631931537)                                         

 

             CALCIUM (test code = 12.0 mg/dL   8.6-10.6     H            



             5233039851)                                         

 

             T PROTEIN (test code = 8.1 g/dL     6.3-8.2                   



             9334949103)                                         

 

             ALBUMIN (test code = 4.7 g/dL     3.5-5.0                   



             6375843358)                                         

 

             ALK PHOS (test code = 173 U/L             H            



             7941362677)                                         

 

             ALTv (test code = 36 U/L       5-50                      



             1742-6)                                             

 

             AST(SGOT) (test code = 18 U/L       13-40                     



             5486899378)                                         

 

             eGFR (test code =              mL/min/1.73m2              



             8538747980)                                         

 

             MAL (test code = MAL) Association of                           



                          Glomerular Filtration                           



                          Rate (GFR) and Staging                           



                          of Kidney Disease*                           



                          +---------------------                           



                          --+-------------------                           



                          --+-------------------                           



                          ------+| GFR                           



                          (mL/min/1.73 m2) ?|                           



                          With Kidney Damage ?|                           



                          ?Without Kidney                           



                          Damage+---------------                           



                          --------+-------------                           



                          --------+-------------                           



                          ------------+| ?>90 ?                           



                          ? ? ? ? ? ? ? ?|                           



                          ?Stage one ? ? ? ? ?|                           



                          ? Normal ? ? ? ? ? ? ?                           



                          ?+--------------------                           



                          ---+------------------                           



                          ---+------------------                           



                          -------+| ?60-89 ? ? ?                           



                          ? ? ? ? ?| ?Stage two                           



                          ? ? ? ? ?| ? Decreased                           



                          GFR ? ? ? ?                            



                          +---------------------                           



                          --+-------------------                           



                          --+-------------------                           



                          ------+| ?30-59 ? ? ?                           



                          ? ? ? ? ?| ?Stage                           



                          three ? ? ? ?| ? Stage                           



                          three ? ? ? ? ?                           



                          +---------------------                           



                          --+-------------------                           



                          --+-------------------                           



                          ------+| ?15-29 ? ? ?                           



                          ? ? ? ? ?| ?Stage four                           



                          ? ? ? ? | ? Stage four                           



                          ? ? ? ? ?                              



                          ?+--------------------                           



                          ---+------------------                           



                          ---+------------------                           



                          -------+| ?<15 (or                           



                          dialysis) ? ?| ?Stage                           



                          five ? ? ? ? | ? Stage                           



                          five ? ? ? ? ?                           



                          ?+--------------------                           



                          ---+------------------                           



                          ---+------------------                           



                          -------+ *Each stage                           



                          assumes the associated                           



                          GFR level has been in                           



                          effect for at least                           



                          three months. ?Stages                           



                          1 to 5, with or                           



                          without kidney                           



                          disease, indicate                           



                          chronic kidney                           



                          disease. Notes:                           



                          Determination of                           



                          stages one and two                           



                          (with eGFR                             



                          >59mL/min/1.73 m2)                           



                          requires estimation of                           



                          kidney damage for at                           



                          least three months as                           



                          defined by structural                           



                          or functional                           



                          abnormalities of the                           



                          kidney, manifested by                           



                          either:Pathological                           



                          abnormalities or                           



                          Markers of kidney                           



                          damage (including                           



                          abnormalities in the                           



                          composition of the                           



                          blood or urine or                           



                          abnormalities in                           



                          imaging tests).                           

 

             Lab Interpretation Abnormal                               



             (test code = 46614-0)                                        



[facility]





             Test Item    Value        Reference    Interpretation Comments



                                       Range                     

 

             TROPONIN I (test <0.012       See_Comment                [Automated



             code = 0835000583)                                        message] 

The



                                                                 system which



                                                                 generated this



                                                                 result



                                                                 transmitted



                                                                 reference range

:



                                                                 <=0.034 ng/mL.



                                                                 The reference



                                                                 range was not



                                                                 used to



                                                                 interpret this



                                                                 result as



                                                                 normal/abnormal

.

 

             MAL (test code = Reference (Normal)                           



             MAL)         Range (defined by                           



                          the 99th percentile                           



                          reference limit): <=                           



                          0.034 ng/mL Note:                           



                          Cardiac troponin                           



                          begins to rise 3-4                           



                          hours after the                           



                          onset of ischemia.                           



                          Repeat in 4-6 hours                           



                          if the sample was                           



                          drawn within 3-4                           



                          hours of the onset                           



                          of the symptom and                           



                          found normal.                           



                          Diagnosis of                           



                          myocardial injury is                           



                          made with acute                           



                          changes in cTn                           



                          concentrations with                           



                          at least one serial                           



                          sample above the                           



                          99th percentile                           



                          upper reference                           



                          limit (URL), taken                           



                          together with the                           



                          patient's clinical                           



                          presentation. Biotin                           



                          has been reported to                           



                          cause a negative                           



                          bias, interpret                           



                          results relative to                           



                          patient's use of                           



                          biotin.                                

 

             Lab Interpretation Normal                                 



             (test code =                                        



             89735-7)                                            



[facility]TER[facility] [id number] 03:48:23





             Test Item    Value        Reference Range Interpretation Comments

 

             NT-proBNP (test code 71 pg/mL     See_Comment                [Autom

ated



             = 4392527755)                                        message] The



                                                                 system which



                                                                 generated this



                                                                 result



                                                                 transmitted



                                                                 reference range

:



                                                                 <=125. The



                                                                 reference range



                                                                 was not used to



                                                                 interpret this



                                                                 result as



                                                                 normal/abnormal

.

 

             MAL (test code = MAL) Biotin has been                           



                          reported to                            



                          cause a negative                           



                          bias, interpret                           



                          results relative                           



                          to patient's use                           



                          of biotin.                             

 

             Lab Interpretation Normal                                 



             (test code = 95994-5)                                        



[id number]23





             Test Item    Value        Reference Range Interpretation Comments

 

             NT-proBNP (test code 71 pg/mL     See_Comment                [Autom

ated



             = 0620912001)                                        message] The



                                                                 system which



                                                                 generated this



                                                                 result



                                                                 transmitted



                                                                 reference range

:



                                                                 <=125. The



                                                                 reference range



                                                                 was not used to



                                                                 interpret this



                                                                 result as



                                                                 normal/abnormal

.

 

             MAL (test code = MAL) Biotin has been                           



                          reported to                            



                          cause a negative                           



                          bias, interpret                           



                          results relative                           



                          to patient's use                           



                          of biotin.                             

 

             Lab Interpretation Normal                                 



             (test code = 05813-3)                                        



[facility]





             Test Item    Value        Reference Range Interpretation Comments

 

             LIPASE (test code = 7398787354) 120 U/L      0-220                 

    

 

             Lab Interpretation (test code = Normal                             

    



             26277-4)                                            



[facility] [id number] 
03:38:23





             Test Item    Value        Reference Range Interpretation Comments

 

             APTT Patient (test              See_Comment                [Automat

ed



             code = 3173-2)                                        message] The



                                                                 system which



                                                                 generated this



                                                                 result



                                                                 transmitted



                                                                 reference range

:



                                                                 23 - 38 Seconds

.



                                                                 The reference



                                                                 range was not



                                                                 used to interpr

et



                                                                 this result as



                                                                 normal/abnormal

.

 

             MAL (test code = MAL) The [facility] patient                           



                          population mean                           



                          normal value for                           



                          aPTT is 30                             



                          seconds.                               

 

             Lab Interpretation Normal                                 



             (test code = 84063-1)                                        



[facility] [id number] 
03:38:23





             Test Item    Value        Reference Range Interpretation Comments

 

             APTT Patient (test              See_Comment                [Automat

ed



             code = 3173-2)                                        message] The



                                                                 system which



                                                                 generated this



                                                                 result



                                                                 transmitted



                                                                 reference range

:



                                                                 23 - 38 Seconds

.



                                                                 The reference



                                                                 range was not



                                                                 used to interpr

et



                                                                 this result as



                                                                 normal/abnormal

.

 

             MAL (test code = MAL) The [facility] patient                           



                          population mean                           



                          normal value for                           



                          aPTT is 30                             



                          seconds.                               

 

             Lab Interpretation Normal                                 



             (test code = 35589-9)                                        



[facility] [id number] 03:36:22





             Test Item    Value        Reference Range Interpretation Comments

 

             PROTIME PATIENT (test              See_Comment                [Auto

mated message]



             code = 5964-2)                                        The system wh

ich



                                                                 generated this 

result



                                                                 transmitted ref

erence



                                                                 range: 12.0 - 1

4.7



                                                                 Seconds. The re

ference



                                                                 range was not u

sed to



                                                                 interpret this 

result



                                                                 as normal/abnor

mal.

 

             INR (test code = 6301-6)                                        Nor

mal INR <1.1;



                                                                 Warfarin Therap

eutic



                                                                 range 2.0 to 3.

0 or



                                                                 2.5 to 3.5, dep

ending



                                                                 upon the indica

tions.

 

             Lab Interpretation (test Normal                                 



             code = 73537-9)                                        



[facility] WITH [id number] 03:35:42





             Test Item    Value        Reference Range Interpretation Comments

 

             WBC (test code =              See_Comment  H             [Automated



             6690-2)                                             message] The



                                                                 system which



                                                                 generated this



                                                                 result transmit

huma



                                                                 reference range

:



                                                                 4.20 - 10.70



                                                                 10*3/?L. The



                                                                 reference range



                                                                 was not used to



                                                                 interpret this



                                                                 result as



                                                                 normal/abnormal

.

 

             RBC (test code =              See_Comment  H             [Automated



             789-8)                                              message] The



                                                                 system which



                                                                 generated this



                                                                 result transmit

huma



                                                                 reference range

:



                                                                 4.26 - 5.52



                                                                 10*6/?L. The



                                                                 reference range



                                                                 was not used to



                                                                 interpret this



                                                                 result as



                                                                 normal/abnormal

.

 

             HGB (test code = 17.8 g/dL    12.2-16.4    H            



             718-7)                                              

 

             HCT (test code = 51.7 %       38.4-49.3    H            



             4544-3)                                             

 

             MCV (test code = 86.9 fL      81.7-95.6                 



             787-2)                                              

 

             MCH (test code = 29.9 pg      26.1-32.7                 



             785-6)                                              

 

             MCHC (test code = 34.4 g/dL    31.2-35.0                 



             786-4)                                              

 

             RDW-SD (test code = 44.7 fL      38.5-51.6                 



             52576-0)                                            

 

             RDW-CV (test code = 14.2 %       12.1-15.4                 



             788-0)                                              

 

             PLT (test code =              See_Comment  H             [Automated



             777-3)                                              message] The



                                                                 system which



                                                                 generated this



                                                                 result transmit

huma



                                                                 reference range

:



                                                                 150 - 328 10*3/

?L.



                                                                 The reference



                                                                 range was not u

sed



                                                                 to interpret th

is



                                                                 result as



                                                                 normal/abnormal

.

 

             MPV (test code = 11.5 fL      9.8-13.0                  



             86152-6)                                            

 

             NRBC/100 WBC (test              See_Comment                [Automat

ed



             code = 2876992603)                                        message] 

The



                                                                 system which



                                                                 generated this



                                                                 result transmit

huma



                                                                 reference range

:



                                                                 0.0 - 10.0 /100



                                                                 WBCs. The



                                                                 reference range



                                                                 was not used to



                                                                 interpret this



                                                                 result as



                                                                 normal/abnormal

.

 

             NRBC x10^3 (test code <0.01        See_Comment                [Auto

mated



             = [id number])                                        message] The



                                                                 system which



                                                                 generated this



                                                                 result transmit

huma



                                                                 reference range

:



                                                                 10*3/?L. The



                                                                 reference range



                                                                 was not used to



                                                                 interpret this



                                                                 result as



                                                                 normal/abnormal

.

 

             GRAN MAT (NEUT) % 85.2 %                                 



             (test code = 770-8)                                        

 

             IMM GRAN % (test code 0.90 %                                 



             = 9250957558)                                        

 

             LYMPH % (test code = 5.9 %                                  



             736-9)                                              

 

             MONO % (test code = 7.8 %                                  



             5905-5)                                             

 

             EOS % (test code = 0.0 %                                  



             713-8)                                              

 

             BASO % (test code = 0.2 %                                  



             706-2)                                              

 

             GRAN MAT x10^3(ANC) 12.85 10*3/uL 1.99-6.95    H            



             (test code =                                        



             6376485978)                                         

 

             IMM GRAN x10^3 (test 0.13 10*3/uL 0.00-0.06    H            



             code = 3449190281)                                        

 

             LYMPH x10^3 (test code 0.89 10*3/uL 1.09-3.23    L            



             = 731-0)                                            

 

             MONO x10^3 (test code 1.17 10*3/uL 0.36-1.02    H            



             = 742-7)                                            

 

             EOS x10^3 (test code = <0.03        0.06-0.53    L            



             711-2)                                              

 

             BASO x10^3 (test code 0.03 10*3/uL 0.01-0.09                 



             = 704-7)                                            

 

             Lab Interpretation Abnormal                               



             (test code = 95253-0)                                        



[facility] [id number] 03:22:23





             Test Item    Value        Reference Range Interpretation Comments

 

             LACTIC ACID (test code = 4.52 mmol/L  0.50-2.20    H            



             4605115309)                                         

 

             Lab Interpretation (test code = Abnormal                           

    



             20471-3)                                            



[facility]NA, K, CL, CO2, 
GLUCOSE, BUN, CREATININE, CA)2022 11:13:09





             Test Item    Value        Reference Range Interpretation Comments

 

             NA (test code = 137 mmol/L   135-145                   



             [id number])                                         

 

             K (test code = 4.8 mmol/L   3.5-5.0                   



             4280818872)                                         

 

             CL (test code = 101 mmol/L                       



             6900837907)                                         

 

             CO2 TOTAL (test code = 24 mmol/L    23-31                     



             0060937365)                                         

 

             AGAP (test code =              2-16                      



             8160648936)                                         

 

             BUN (test code = 17 mg/dL     7-23                      



             0609764113)                                         

 

             GLUCOSE (test code = 323 mg/dL           H            



             0084167022)                                         

 

             CREATININE (test code = 0.56 mg/dL   0.60-1.25    L            



             1635483390)                                         

 

             CALCIUM (test code = 9.6 mg/dL    8.6-10.6                  



             8234930090)                                         

 

             eGFR (test code =              mL/min/1.73m2              



             8362612683)                                         

 

             MAL (test code = MAL) Association of                           



                          Glomerular Filtration                           



                          Rate (GFR) and Staging                           



                          of Kidney Disease*                           



                          +---------------------                           



                          --+-------------------                           



                          --+-------------------                           



                          ------+| GFR                           



                          (mL/min/1.73 m2) ?|                           



                          With Kidney Damage ?|                           



                          ?Without Kidney                           



                          Damage+---------------                           



                          --------+-------------                           



                          --------+-------------                           



                          ------------+| ?>90 ?                           



                          ? ? ? ? ? ? ? ?|                           



                          ?Stage one ? ? ? ? ?|                           



                          ? Normal ? ? ? ? ? ? ?                           



                          ?+--------------------                           



                          ---+------------------                           



                          ---+------------------                           



                          -------+| ?60-89 ? ? ?                           



                          ? ? ? ? ?| ?Stage two                           



                          ? ? ? ? ?| ? Decreased                           



                          GFR ? ? ? ?                            



                          +---------------------                           



                          --+-------------------                           



                          --+-------------------                           



                          ------+| ?30-59 ? ? ?                           



                          ? ? ? ? ?| ?Stage                           



                          three ? ? ? ?| ? Stage                           



                          three ? ? ? ? ?                           



                          +---------------------                           



                          --+-------------------                           



                          --+-------------------                           



                          ------+| ?15-29 ? ? ?                           



                          ? ? ? ? ?| ?Stage four                           



                          ? ? ? ? | ? Stage four                           



                          ? ? ? ? ?                              



                          ?+--------------------                           



                          ---+------------------                           



                          ---+------------------                           



                          -------+| ?<15 (or                           



                          dialysis) ? ?| ?Stage                           



                          five ? ? ? ? | ? Stage                           



                          five ? ? ? ? ?                           



                          ?+--------------------                           



                          ---+------------------                           



                          ---+------------------                           



                          -------+ *Each stage                           



                          assumes the associated                           



                          GFR level has been in                           



                          effect for at least                           



                          three months. ?Stages                           



                          1 to 5, with or                           



                          without kidney                           



                          disease, indicate                           



                          chronic kidney                           



                          disease. Notes:                           



                          Determination of                           



                          stages one and two                           



                          (with eGFR                             



                          >59mL/min/1.73 m2)                           



                          requires estimation of                           



                          kidney damage for at                           



                          least three months as                           



                          defined by structural                           



                          or functional                           



                          abnormalities of the                           



                          kidney, manifested by                           



                          either:Pathological                           



                          abnormalities or                           



                          Markers of kidney                           



                          damage (including                           



                          abnormalities in the                           



                          composition of the                           



                          blood or urine or                           



                          abnormalities in                           



                          imaging tests).                           

 

             Lab Interpretation Abnormal                               



             (test code = 04406-8)                                        



[facility] WITH [id number] 10:49:07





             Test Item    Value        Reference Range Interpretation Comments

 

             WBC (test code =              See_Comment  H             [Automated



             6324-2)                                             message] The sy

stem



                                                                 which generated



                                                                 this result



                                                                 transmitted



                                                                 reference range

:



                                                                 4.20 - 10.70



                                                                 10*3/?L. The



                                                                 reference range

 was



                                                                 not used to



                                                                 interpret this



                                                                 result as



                                                                 normal/abnormal

.

 

             RBC (test code =              See_Comment                [Automated



             419-8)                                              message] The sy

stem



                                                                 which generated



                                                                 this result



                                                                 transmitted



                                                                 reference range

:



                                                                 4.26 - 5.52



                                                                 10*6/?L. The



                                                                 reference range

 was



                                                                 not used to



                                                                 interpret this



                                                                 result as



                                                                 normal/abnormal

.

 

             HGB (test code = 15.6 g/dL    12.2-16.4                 



             718-7)                                              

 

             HCT (test code = 46.6 %       38.4-49.3                 



             4544-3)                                             

 

             MCV (test code = 88.3 fL      81.7-95.6                 



             787-2)                                              

 

             MCH (test code = 29.5 pg      26.1-32.7                 



             785-6)                                              

 

             MCHC (test code = 33.5 g/dL    31.2-35.0                 



             786-4)                                              

 

             RDW-SD (test code = 46.7 fL      38.5-51.6                 



             05600-6)                                            

 

             RDW-CV (test code = 14.5 %       12.1-15.4                 



             788-0)                                              

 

             PLT (test code =              See_Comment  H             [Automated



             777-3)                                              message] The sy

stem



                                                                 which generated



                                                                 this result



                                                                 transmitted



                                                                 reference range

:



                                                                 150 - 328 10*3/

?L.



                                                                 The reference r

zhen



                                                                 was not used to



                                                                 interpret this



                                                                 result as



                                                                 normal/abnormal

.

 

             MPV (test code = 10.2 fL      9.8-13.0                  



             40080-0)                                            

 

             NRBC/100 WBC (test              See_Comment                [Automat

ed



             code = [id number])                                        message] 

The system



                                                                 which generated



                                                                 this result



                                                                 transmitted



                                                                 reference range

:



                                                                 0.0 - 10.0 /100



                                                                 WBCs. The refer

ence



                                                                 range was not u

sed



                                                                 to interpret th

is



                                                                 result as



                                                                 normal/abnormal

.

 

             NRBC x10^3 (test code <0.01        See_Comment                [Auto

mated



             = [id number])                                        message] The s

ystem



                                                                 which generated



                                                                 this result



                                                                 transmitted



                                                                 reference range

:



                                                                 10*3/?L. The



                                                                 reference range

 was



                                                                 not used to



                                                                 interpret this



                                                                 result as



                                                                 normal/abnormal

.

 

             GRAN MAT (NEUT) % 70.2 %                                 



             (test code = 770-8)                                        

 

             IMM GRAN % (test code 0.80 %                                 



             = [id number])                                        

 

             LYMPH % (test code = 18.0 %                                 



             736-9)                                              

 

             MONO % (test code = 8.5 %                                  



             5905-5)                                             

 

             EOS % (test code = 2.1 %                                  



             713-8)                                              

 

             BASO % (test code = 0.4 %                                  



             706-2)                                              

 

             GRAN MAT x10^3(ANC) 7.63 10*3/uL 1.99-6.95    H            



             (test code =                                        



             [id number])                                         

 

             IMM GRAN x10^3 (test 0.09 10*3/uL 0.00-0.06    H            



             code = [id number])                                        

 

             LYMPH x10^3 (test code 1.96 10*3/uL 1.09-3.23                 



             = 731-0)                                            

 

             MONO x10^3 (test code 0.92 10*3/uL 0.36-1.02                 



             = 742-7)                                            

 

             EOS x10^3 (test code = 0.23 10*3/uL 0.06-0.53                 



             711-2)                                              

 

             BASO x10^3 (test code 0.04 10*3/uL 0.01-0.09                 



             = 704-7)                                            

 

             Lab Interpretation Abnormal                               



             (test code = 66466-1)                                        



[facility] METABOLIC PANEL (NA, K, CL, CO2, 
GLUCOSE, BUN, CREATININE, CA)2022 09:48:58





             Test Item    Value        Reference Range Interpretation Comments

 

             NA (test code = 135 mmol/L   135-145                   



             1274973017)                                         

 

             K (test code = 4.7 mmol/L   3.5-5.0                   



             8252837103)                                         

 

             CL (test code = 101 mmol/L                       



             5792014875)                                         

 

             CO2 TOTAL (test code = 23 mmol/L    23-31                     



             3335636927)                                         

 

             AGAP (test code =              2-16                      



             4898550164)                                         

 

             BUN (test code = 18 mg/dL     7-23                      



             8183828438)                                         

 

             GLUCOSE (test code = 346 mg/dL           H            



             4942515120)                                         

 

             CREATININE (test code = 0.64 mg/dL   0.60-1.25                 



             8192242639)                                         

 

             CALCIUM (test code = 9.1 mg/dL    8.6-10.6                  



             9690865662)                                         

 

             eGFR (test code =              mL/min/1.73m2              



             9638819612)                                         

 

             MAL (test code = MAL) Association of                           



                          Glomerular Filtration                           



                          Rate (GFR) and Staging                           



                          of Kidney Disease*                           



                          +---------------------                           



                          --+-------------------                           



                          --+-------------------                           



                          ------+| GFR                           



                          (mL/min/1.73 m2) ?|                           



                          With Kidney Damage ?|                           



                          ?Without Kidney                           



                          Damage+---------------                           



                          --------+-------------                           



                          --------+-------------                           



                          ------------+| ?>90 ?                           



                          ? ? ? ? ? ? ? ?|                           



                          ?Stage one ? ? ? ? ?|                           



                          ? Normal ? ? ? ? ? ? ?                           



                          ?+--------------------                           



                          ---+------------------                           



                          ---+------------------                           



                          -------+| ?60-89 ? ? ?                           



                          ? ? ? ? ?| ?Stage two                           



                          ? ? ? ? ?| ? Decreased                           



                          GFR ? ? ? ?                            



                          +---------------------                           



                          --+-------------------                           



                          --+-------------------                           



                          ------+| ?30-59 ? ? ?                           



                          ? ? ? ? ?| ?Stage                           



                          three ? ? ? ?| ? Stage                           



                          three ? ? ? ? ?                           



                          +---------------------                           



                          --+-------------------                           



                          --+-------------------                           



                          ------+| ?15-29 ? ? ?                           



                          ? ? ? ? ?| ?Stage four                           



                          ? ? ? ? | ? Stage four                           



                          ? ? ? ? ?                              



                          ?+--------------------                           



                          ---+------------------                           



                          ---+------------------                           



                          -------+| ?<15 (or                           



                          dialysis) ? ?| ?Stage                           



                          five ? ? ? ? | ? Stage                           



                          five ? ? ? ? ?                           



                          ?+--------------------                           



                          ---+------------------                           



                          ---+------------------                           



                          -------+ *Each stage                           



                          assumes the associated                           



                          GFR level has been in                           



                          effect for at least                           



                          three months. ?Stages                           



                          1 to 5, with or                           



                          without kidney                           



                          disease, indicate                           



                          chronic kidney                           



                          disease. Notes:                           



                          Determination of                           



                          stages one and two                           



                          (with eGFR                             



                          >59mL/min/1.73 m2)                           



                          requires estimation of                           



                          kidney damage for at                           



                          least three months as                           



                          defined by structural                           



                          or functional                           



                          abnormalities of the                           



                          kidney, manifested by                           



                          either:Pathological                           



                          abnormalities or                           



                          Markers of kidney                           



                          damage (including                           



                          abnormalities in the                           



                          composition of the                           



                          blood or urine or                           



                          abnormalities in                           



                          imaging tests).                           

 

             Lab Interpretation Abnormal                               



             (test code = 50787-8)                                        



[facility] WITH [id number] 09:06:55





             Test Item    Value        Reference Range Interpretation Comments

 

             WBC (test code =              See_Comment                [Automated



             5749-2)                                             message] The sy

stem



                                                                 which generated



                                                                 this result



                                                                 transmitted



                                                                 reference range

:



                                                                 4.20 - 10.70



                                                                 10*3/?L. The



                                                                 reference range

 was



                                                                 not used to



                                                                 interpret this



                                                                 result as



                                                                 normal/abnormal

.

 

             RBC (test code =              See_Comment                [Automated



             783-8)                                              message] The sy

stem



                                                                 which generated



                                                                 this result



                                                                 transmitted



                                                                 reference range

:



                                                                 4.26 - 5.52



                                                                 10*6/?L. The



                                                                 reference range

 was



                                                                 not used to



                                                                 interpret this



                                                                 result as



                                                                 normal/abnormal

.

 

             HGB (test code = 15.0 g/dL    12.2-16.4                 



             718-7)                                              

 

             HCT (test code = 44.8 %       38.4-49.3                 



             4544-3)                                             

 

             MCV (test code = 88.2 fL      81.7-95.6                 



             787-2)                                              

 

             MCH (test code = 29.5 pg      26.1-32.7                 



             785-6)                                              

 

             MCHC (test code = 33.5 g/dL    31.2-35.0                 



             786-4)                                              

 

             RDW-SD (test code = 47.7 fL      38.5-51.6                 



             86416-3)                                            

 

             RDW-CV (test code = 14.6 %       12.1-15.4                 



             788-0)                                              

 

             PLT (test code =              See_Comment                [Automated



             777-3)                                              message] The sy

stem



                                                                 which generated



                                                                 this result



                                                                 transmitted



                                                                 reference range

:



                                                                 150 - 328 10*3/

?L.



                                                                 The reference r

zhen



                                                                 was not used to



                                                                 interpret this



                                                                 result as



                                                                 normal/abnormal

.

 

             MPV (test code = 9.9 fL       9.8-13.0                  



             88132-9)                                            

 

             NRBC/100 WBC (test              See_Comment                [Automat

ed



             code = 3923531847)                                        message] 

The system



                                                                 which generated



                                                                 this result



                                                                 transmitted



                                                                 reference range

:



                                                                 0.0 - 10.0 /100



                                                                 WBCs. The refer

ence



                                                                 range was not u

sed



                                                                 to interpret th

is



                                                                 result as



                                                                 normal/abnormal

.

 

             NRBC x10^3 (test code <0.01        See_Comment                [Auto

mated



             = [id number])                                        message] The s

ystem



                                                                 which generated



                                                                 this result



                                                                 transmitted



                                                                 reference range

:



                                                                 10*3/?L. The



                                                                 reference range

 was



                                                                 not used to



                                                                 interpret this



                                                                 result as



                                                                 normal/abnormal

.

 

             GRAN MAT (NEUT) % 62.6 %                                 



             (test code = 770-8)                                        

 

             IMM GRAN % (test code 1.30 %                                 



             = 0059219455)                                        

 

             LYMPH % (test code = 23.2 %                                 



             736-9)                                              

 

             MONO % (test code = 9.6 %                                  



             5905-5)                                             

 

             EOS % (test code = 2.9 %                                  



             713-8)                                              

 

             BASO % (test code = 0.4 %                                  



             706-2)                                              

 

             GRAN MAT x10^3(ANC) 5.85 10*3/uL 1.99-6.95                 



             (test code =                                        



             0824869664)                                         

 

             IMM GRAN x10^3 (test 0.12 10*3/uL 0.00-0.06    H            



             code = 4836629016)                                        

 

             LYMPH x10^3 (test code 2.17 10*3/uL 1.09-3.23                 



             = 731-0)                                            

 

             MONO x10^3 (test code 0.90 10*3/uL 0.36-1.02                 



             = 742-7)                                            

 

             EOS x10^3 (test code = 0.27 10*3/uL 0.06-0.53                 



             711-2)                                              

 

             BASO x10^3 (test code 0.04 10*3/uL 0.01-0.09                 



             = 704-7)                                            

 

             Lab Interpretation Abnormal                               



             (test code = 44290-7)                                        



[facility] CULTURE [id number] 02:01:46





             Test Item    Value        Reference Range Interpretation Comments

 

             Blood Culture-Aerobic No organisms No growth                 Previo

us



             (test code = 17928-3) isolated                               prelim

inary



                                                                 verified result



                                                                 was Culture In



                                                                 Progress on



                                                                 2022 at 00

01



                                                                 CDTPrevious



                                                                 preliminary



                                                                 verified result



                                                                 was No growth a

t



                                                                 24 hours on



                                                                 2022 at 21

01



                                                                 CDTPrevious



                                                                 preliminary



                                                                 verified result



                                                                 was No growth a

t



                                                                 48 hours on



                                                                 2022 at 21





                                                                 CDTPrevious



                                                                 preliminary



                                                                 verified result



                                                                 was No growth a

t



                                                                 72 hours on



                                                                 2022 at 21

01



                                                                 CDT

 

             Blood        No organisms No growth                 Previous



             Culture-Anaerobic isolated                               preliminar

y



             (test code = 85523-2)                                        verifi

ed result



                                                                 was Culture In



                                                                 Progress on



                                                                 2022 at 00

01



                                                                 CDTPrevious



                                                                 preliminary



                                                                 verified result



                                                                 was No growth a

t



                                                                 24 hours on



                                                                 2022 at 21





                                                                 CDTPrevious



                                                                 preliminary



                                                                 verified result



                                                                 was No growth a

t



                                                                 48 hours on



                                                                 2022 at 21





                                                                 CDTPrevious



                                                                 preliminary



                                                                 verified result



                                                                 was No growth a

t



                                                                 72 hours on



                                                                 2022 at 21

01



                                                                 CDT

 

             Lab Interpretation Normal                                 



             (test code = 60086-8)                                        



[facility] CULTURE [id number] 02:01:46





             Test Item    Value        Reference Range Interpretation Comments

 

             Blood Culture-Aerobic No organisms No growth                 Previo

us



             (test code = 17928-3) isolated                               prelim

inary



                                                                 verified result



                                                                 was Culture In



                                                                 Progress on



                                                                 2022 at 00

01



                                                                 CDTPrevious



                                                                 preliminary



                                                                 verified result



                                                                 was No growth a

t



                                                                 24 hours on



                                                                 2022 at 21

01



                                                                 CDTPrevious



                                                                 preliminary



                                                                 verified result



                                                                 was No growth a

t



                                                                 48 hours on



                                                                 2022 at 21





                                                                 CDTPrevious



                                                                 preliminary



                                                                 verified result



                                                                 was No growth a

t



                                                                 72 hours on



                                                                 2022 at 21

01



                                                                 CDT

 

             Blood        No organisms No growth                 Previous



             Culture-Anaerobic isolated                               preliminar

y



             (test code = 73609-2)                                        verifi

ed result



                                                                 was Culture In



                                                                 Progress on



                                                                 2022 at 00

01



                                                                 CDTPrevious



                                                                 preliminary



                                                                 verified result



                                                                 was No growth a

t



                                                                 24 hours on



                                                                 2022 at 21

01



                                                                 CDTPrevious



                                                                 preliminary



                                                                 verified result



                                                                 was No growth a

t



                                                                 48 hours on



                                                                 2022 at 21





                                                                 CDTPrevious



                                                                 preliminary



                                                                 verified result



                                                                 was No growth a

t



                                                                 72 hours on



                                                                 2022 at 21

01



                                                                 CDT

 

             Lab Interpretation Normal                                 



             (test code = 65864-9)                                        



[id number]





             Test Item    Value        Reference Range Interpretation Comments

 

             NT-proBNP (test code 117 pg/mL    See_Comment                [Autom

ated



             = 1425205155)                                        message] The



                                                                 system which



                                                                 generated this



                                                                 result



                                                                 transmitted



                                                                 reference range

:



                                                                 <=125. The



                                                                 reference range



                                                                 was not used to



                                                                 interpret this



                                                                 result as



                                                                 normal/abnormal

.

 

             MAL (test code = MAL) Biotin has been                           



                          reported to                            



                          cause a negative                           



                          bias, interpret                           



                          results relative                           



                          to patient's use                           



                          of biotin.                             

 

             Lab Interpretation Normal                                 



             (test code = 29069-4)                                        



[facility]. METABOLIC PANEL ([id number] 
12:10:25





             Test Item    Value        Reference Range Interpretation Comments

 

             NA (test code = 137 mmol/L   135-145                   



             3949112798)                                         

 

             K (test code = 4.6 mmol/L   3.5-5.0                   



             2388157449)                                         

 

             CL (test code = 108 mmol/L                       



             1333795701)                                         

 

             CO2 TOTAL (test code = 20 mmol/L    23-31        L            



             5154418969)                                         

 

             AGAP (test code =              2-16                      



             7255638464)                                         

 

             BUN (test code = 12 mg/dL     7-23                      



             7771480696)                                         

 

             GLUCOSE (test code = 186 mg/dL           H            



             2655483574)                                         

 

             CREATININE (test code = 0.49 mg/dL   0.60-1.25    L            



             5669477513)                                         

 

             TOTAL BILI (test code = 0.7 mg/dL    0.1-1.1                   



             8100799768)                                         

 

             CALCIUM (test code = 8.7 mg/dL    8.6-10.6                  



             2270693663)                                         

 

             T PROTEIN (test code = 6.9 g/dL     6.3-8.2                   



             1445940089)                                         

 

             ALBUMIN (test code = 3.7 g/dL     3.5-5.0                   



             5167293285)                                         

 

             ALK PHOS (test code = 114 U/L                          



             9194855899)                                         

 

             ALTv (test code = 75 U/L       5-50         H            



             1742-6)                                             

 

             AST(SGOT) (test code = 27 U/L       13-40                     



             6278952146)                                         

 

             eGFR (test code =              mL/min/1.73m2              



             7104781052)                                         

 

             MAL (test code = MAL) Association of                           



                          Glomerular Filtration                           



                          Rate (GFR) and Staging                           



                          of Kidney Disease*                           



                          +---------------------                           



                          --+-------------------                           



                          --+-------------------                           



                          ------+| GFR                           



                          (mL/min/1.73 m2) ?|                           



                          With Kidney Damage ?|                           



                          ?Without Kidney                           



                          Damage+---------------                           



                          --------+-------------                           



                          --------+-------------                           



                          ------------+| ?>90 ?                           



                          ? ? ? ? ? ? ? ?|                           



                          ?Stage one ? ? ? ? ?|                           



                          ? Normal ? ? ? ? ? ? ?                           



                          ?+--------------------                           



                          ---+------------------                           



                          ---+------------------                           



                          -------+| ?60-89 ? ? ?                           



                          ? ? ? ? ?| ?Stage two                           



                          ? ? ? ? ?| ? Decreased                           



                          GFR ? ? ? ?                            



                          +---------------------                           



                          --+-------------------                           



                          --+-------------------                           



                          ------+| ?30-59 ? ? ?                           



                          ? ? ? ? ?| ?Stage                           



                          three ? ? ? ?| ? Stage                           



                          three ? ? ? ? ?                           



                          +---------------------                           



                          --+-------------------                           



                          --+-------------------                           



                          ------+| ?15-29 ? ? ?                           



                          ? ? ? ? ?| ?Stage four                           



                          ? ? ? ? | ? Stage four                           



                          ? ? ? ? ?                              



                          ?+--------------------                           



                          ---+------------------                           



                          ---+------------------                           



                          -------+| ?<15 (or                           



                          dialysis) ? ?| ?Stage                           



                          five ? ? ? ? | ? Stage                           



                          five ? ? ? ? ?                           



                          ?+--------------------                           



                          ---+------------------                           



                          ---+------------------                           



                          -------+ *Each stage                           



                          assumes the associated                           



                          GFR level has been in                           



                          effect for at least                           



                          three months. ?Stages                           



                          1 to 5, with or                           



                          without kidney                           



                          disease, indicate                           



                          chronic kidney                           



                          disease. Notes:                           



                          Determination of                           



                          stages one and two                           



                          (with eGFR                             



                          >59mL/min/1.73 m2)                           



                          requires estimation of                           



                          kidney damage for at                           



                          least three months as                           



                          defined by structural                           



                          or functional                           



                          abnormalities of the                           



                          kidney, manifested by                           



                          either:Pathological                           



                          abnormalities or                           



                          Markers of kidney                           



                          damage (including                           



                          abnormalities in the                           



                          composition of the                           



                          blood or urine or                           



                          abnormalities in                           



                          imaging tests).                           

 

             Lab Interpretation Abnormal                               



             (test code = 65494-2)                                        



[facility] WITH [id number] 11:14:25





             Test Item    Value        Reference Range Interpretation Comments

 

             WBC (test code =              See_Comment                [Automated



             6809-2)                                             message] The sy

stem



                                                                 which generated



                                                                 this result



                                                                 transmitted



                                                                 reference range

:



                                                                 4.20 - 10.70



                                                                 10*3/?L. The



                                                                 reference range

 was



                                                                 not used to



                                                                 interpret this



                                                                 result as



                                                                 normal/abnormal

.

 

             RBC (test code =              See_Comment                [Automated



             889-8)                                              message] The sy

stem



                                                                 which generated



                                                                 this result



                                                                 transmitted



                                                                 reference range

:



                                                                 4.26 - 5.52



                                                                 10*6/?L. The



                                                                 reference range

 was



                                                                 not used to



                                                                 interpret this



                                                                 result as



                                                                 normal/abnormal

.

 

             HGB (test code = 14.8 g/dL    12.2-16.4                 



             718-7)                                              

 

             HCT (test code = 44.8 %       38.4-49.3                 



             4544-3)                                             

 

             MCV (test code = 89.1 fL      81.7-95.6                 



             787-2)                                              

 

             MCH (test code = 29.4 pg      26.1-32.7                 



             785-6)                                              

 

             MCHC (test code = 33.0 g/dL    31.2-35.0                 



             786-4)                                              

 

             RDW-SD (test code = 48.7 fL      38.5-51.6                 



             87372-6)                                            

 

             RDW-CV (test code = 15.0 %       12.1-15.4                 



             788-0)                                              

 

             PLT (test code =              See_Comment                [Automated



             777-3)                                              message] The sy

stem



                                                                 which generated



                                                                 this result



                                                                 transmitted



                                                                 reference range

:



                                                                 150 - 328 10*3/

?L.



                                                                 The reference r

zhen



                                                                 was not used to



                                                                 interpret this



                                                                 result as



                                                                 normal/abnormal

.

 

             MPV (test code = 10.4 fL      9.8-13.0                  



             22172-1)                                            

 

             NRBC/100 WBC (test              See_Comment                [Automat

ed



             code = 8632066635)                                        message] 

The system



                                                                 which generated



                                                                 this result



                                                                 transmitted



                                                                 reference range

:



                                                                 0.0 - 10.0 /100



                                                                 WBCs. The refer

ence



                                                                 range was not u

sed



                                                                 to interpret th

is



                                                                 result as



                                                                 normal/abnormal

.

 

             NRBC x10^3 (test code <0.01        See_Comment                [Auto

mated



             = [id number])                                        message] The s

ystem



                                                                 which generated



                                                                 this result



                                                                 transmitted



                                                                 reference range

:



                                                                 10*3/?L. The



                                                                 reference range

 was



                                                                 not used to



                                                                 interpret this



                                                                 result as



                                                                 normal/abnormal

.

 

             GRAN MAT (NEUT) % 65.5 %                                 



             (test code = 770-8)                                        

 

             IMM GRAN % (test code 0.80 %                                 



             = 6226139754)                                        

 

             LYMPH % (test code = 20.9 %                                 



             736-9)                                              

 

             MONO % (test code = 9.7 %                                  



             5905-5)                                             

 

             EOS % (test code = 2.7 %                                  



             713-8)                                              

 

             BASO % (test code = 0.4 %                                  



             706-2)                                              

 

             GRAN MAT x10^3(ANC) 5.41 10*3/uL 1.99-6.95                 



             (test code =                                        



             1665293262)                                         

 

             IMM GRAN x10^3 (test 0.07 10*3/uL 0.00-0.06    H            



             code = 3833322784)                                        

 

             LYMPH x10^3 (test code 1.73 10*3/uL 1.09-3.23                 



             = 731-0)                                            

 

             MONO x10^3 (test code 0.80 10*3/uL 0.36-1.02                 



             = 742-7)                                            

 

             EOS x10^3 (test code = 0.22 10*3/uL 0.06-0.53                 



             711-2)                                              

 

             BASO x10^3 (test code 0.03 10*3/uL 0.01-0.09                 



             = 704-7)                                            

 

             Lab Interpretation Abnormal                               



             (test code = 70933-8)                                        



[id number]





             Test Item    Value        Reference Range Interpretation Comments

 

             NT-proBNP (test code 157 pg/mL    See_Comment  H             [Autom

ated



             = 4134446597)                                        message] The



                                                                 system which



                                                                 generated this



                                                                 result



                                                                 transmitted



                                                                 reference range

:



                                                                 <=125. The



                                                                 reference range



                                                                 was not used to



                                                                 interpret this



                                                                 result as



                                                                 normal/abnormal

.

 

             MAL (test code = MAL) Biotin has been                           



                          reported to                            



                          cause a negative                           



                          bias, interpret                           



                          results relative                           



                          to patient's use                           



                          of biotin.                             

 

             Lab Interpretation Abnormal                               



             (test code = 26192-6)                                        



[id number] 
[facility]:08:45





             Test Item    Value        Reference Range Interpretation Comments

 

             NA (test code = 141 mmol/L   135-145                   



             0800349333)                                         

 

             K (test code = 4.2 mmol/L   3.5-5.0                   



             5135956684)                                         

 

             CL (test code = 110 mmol/L          H            



             0196785764)                                         

 

             CO2 TOTAL (test code = 24 mmol/L    23-31                     



             3050651617)                                         

 

             AGAP (test code =              2-16                      



             5003689661)                                         

 

             BUN (test code = 11 mg/dL     7-23                      



             8381841113)                                         

 

             GLUCOSE (test code = 142 mg/dL           H            



             5751183551)                                         

 

             CREATININE (test code = 0.61 mg/dL   0.60-1.25                 



             6730870331)                                         

 

             TOTAL BILI (test code = 0.7 mg/dL    0.1-1.1                   



             1077732453)                                         

 

             CALCIUM (test code = 8.5 mg/dL    8.6-10.6     L            



             2308787049)                                         

 

             T PROTEIN (test code = 6.7 g/dL     6.3-8.2                   



             4268893414)                                         

 

             ALBUMIN (test code = 3.6 g/dL     3.5-5.0                   



             9340569328)                                         

 

             ALK PHOS (test code = 120 U/L                          



             1452512036)                                         

 

             ALTv (test code = 88 U/L       5-50         H            



             1742-6)                                             

 

             AST(SGOT) (test code = 27 U/L       13-40                     



             9721844961)                                         

 

             eGFR (test code =              mL/min/1.73m2              



             5204792625)                                         

 

             MAL (test code = MAL) Association of                           



                          Glomerular Filtration                           



                          Rate (GFR) and Staging                           



                          of Kidney Disease*                           



                          +---------------------                           



                          --+-------------------                           



                          --+-------------------                           



                          ------+| GFR                           



                          (mL/min/1.73 m2) ?|                           



                          With Kidney Damage ?|                           



                          ?Without Kidney                           



                          Damage+---------------                           



                          --------+-------------                           



                          --------+-------------                           



                          ------------+| ?>90 ?                           



                          ? ? ? ? ? ? ? ?|                           



                          ?Stage one ? ? ? ? ?|                           



                          ? Normal ? ? ? ? ? ? ?                           



                          ?+--------------------                           



                          ---+------------------                           



                          ---+------------------                           



                          -------+| ?60-89 ? ? ?                           



                          ? ? ? ? ?| ?Stage two                           



                          ? ? ? ? ?| ? Decreased                           



                          GFR ? ? ? ?                            



                          +---------------------                           



                          --+-------------------                           



                          --+-------------------                           



                          ------+| ?30-59 ? ? ?                           



                          ? ? ? ? ?| ?Stage                           



                          three ? ? ? ?| ? Stage                           



                          three ? ? ? ? ?                           



                          +---------------------                           



                          --+-------------------                           



                          --+-------------------                           



                          ------+| ?15-29 ? ? ?                           



                          ? ? ? ? ?| ?Stage four                           



                          ? ? ? ? | ? Stage four                           



                          ? ? ? ? ?                              



                          ?+--------------------                           



                          ---+------------------                           



                          ---+------------------                           



                          -------+| ?<15 (or                           



                          dialysis) ? ?| ?Stage                           



                          five ? ? ? ? | ? Stage                           



                          five ? ? ? ? ?                           



                          ?+--------------------                           



                          ---+------------------                           



                          ---+------------------                           



                          -------+ *Each stage                           



                          assumes the associated                           



                          GFR level has been in                           



                          effect for at least                           



                          three months. ?Stages                           



                          1 to 5, with or                           



                          without kidney                           



                          disease, indicate                           



                          chronic kidney                           



                          disease. Notes:                           



                          Determination of                           



                          stages one and two                           



                          (with eGFR                             



                          >59mL/min/1.73 m2)                           



                          requires estimation of                           



                          kidney damage for at                           



                          least three months as                           



                          defined by structural                           



                          or functional                           



                          abnormalities of the                           



                          kidney, manifested by                           



                          either:Pathological                           



                          abnormalities or                           



                          Markers of kidney                           



                          damage (including                           



                          abnormalities in the                           



                          composition of the                           



                          blood or urine or                           



                          abnormalities in                           



                          imaging tests).                           

 

             Lab Interpretation Abnormal                               



             (test code = 66285-7)                                        



[facility]





             Test Item    Value        Reference Range Interpretation Comments

 

             MAGNESIUM (test code = 6658584587) 1.7 mg/dL    1.7-2.4            

       

 

             Lab Interpretation (test code = Normal                             

    



             66175-2)                                            



[facility] WITH [id number] 11:57:56





             Test Item    Value        Reference Range Interpretation Comments

 

             WBC (test code =              See_Comment                [Automated



             6690-2)                                             message] The sy

stem



                                                                 which generated



                                                                 this result



                                                                 transmitted



                                                                 reference range

:



                                                                 4.20 - 10.70



                                                                 10*3/?L. The



                                                                 reference range

 was



                                                                 not used to



                                                                 interpret this



                                                                 result as



                                                                 normal/abnormal

.

 

             RBC (test code =              See_Comment                [Automated



             789-8)                                              message] The sy

stem



                                                                 which generated



                                                                 this result



                                                                 transmitted



                                                                 reference range

:



                                                                 4.26 - 5.52



                                                                 10*6/?L. The



                                                                 reference range

 was



                                                                 not used to



                                                                 interpret this



                                                                 result as



                                                                 normal/abnormal

.

 

             HGB (test code = 14.6 g/dL    12.2-16.4                 



             718-7)                                              

 

             HCT (test code = 43.5 %       38.4-49.3                 



             4544-3)                                             

 

             MCV (test code = 88.4 fL      81.7-95.6                 



             787-2)                                              

 

             MCH (test code = 29.7 pg      26.1-32.7                 



             785-6)                                              

 

             MCHC (test code = 33.6 g/dL    31.2-35.0                 



             786-4)                                              

 

             RDW-SD (test code = 48.9 fL      38.5-51.6                 



             32768-1)                                            

 

             RDW-CV (test code = 14.9 %       12.1-15.4                 



             788-0)                                              

 

             PLT (test code =              See_Comment                [Automated



             777-3)                                              message] The sy

stem



                                                                 which generated



                                                                 this result



                                                                 transmitted



                                                                 reference range

:



                                                                 150 - 328 10*3/

?L.



                                                                 The reference r

zhen



                                                                 was not used to



                                                                 interpret this



                                                                 result as



                                                                 normal/abnormal

.

 

             MPV (test code = 9.4 fL       9.8-13.0     L            



             73411-7)                                            

 

             NRBC/100 WBC (test              See_Comment                [Automat

ed



             code = 7543556723)                                        message] 

The system



                                                                 which generated



                                                                 this result



                                                                 transmitted



                                                                 reference range

:



                                                                 0.0 - 10.0 /100



                                                                 WBCs. The refer

ence



                                                                 range was not u

sed



                                                                 to interpret th

is



                                                                 result as



                                                                 normal/abnormal

.

 

             NRBC x10^3 (test code <0.01        See_Comment                [Auto

mated



             = [id number])                                        message] The s

ystem



                                                                 which generated



                                                                 this result



                                                                 transmitted



                                                                 reference range

:



                                                                 10*3/?L. The



                                                                 reference range

 was



                                                                 not used to



                                                                 interpret this



                                                                 result as



                                                                 normal/abnormal

.

 

             GRAN MAT (NEUT) % 63.9 %                                 



             (test code = 770-8)                                        

 

             IMM GRAN % (test code 0.70 %                                 



             = [id number])                                        

 

             LYMPH % (test code = 22.7 %                                 



             736-9)                                              

 

             MONO % (test code = 9.6 %                                  



             5905-5)                                             

 

             EOS % (test code = 2.7 %                                  



             713-8)                                              

 

             BASO % (test code = 0.4 %                                  



             706-2)                                              

 

             GRAN MAT x10^3(ANC) 4.75 10*3/uL 1.99-6.95                 



             (test code =                                        



             5399281699)                                         

 

             IMM GRAN x10^3 (test 0.05 10*3/uL 0.00-0.06                 



             code = 9877116064)                                        

 

             LYMPH x10^3 (test code 1.69 10*3/uL 1.09-3.23                 



             = 731-0)                                            

 

             MONO x10^3 (test code 0.71 10*3/uL 0.36-1.02                 



             = 742-7)                                            

 

             EOS x10^3 (test code = 0.20 10*3/uL 0.06-0.53                 



             711-2)                                              

 

             BASO x10^3 (test code 0.03 10*3/uL 0.01-0.09                 



             = 704-7)                                            

 

             Lab Interpretation Abnormal                               



             (test code = 32576-8)                                        



[id number]





             Test Item    Value        Reference Range Interpretation Comments

 

             VIT B12 (test code = 249 pg/mL    240-930                   



             [id number])                                         

 

             MAL (test code = MAL) Biotin has been                           



                          reported to cause a                           



                          positive bias,                           



                          interpret results                           



                          relative to                            



                          patient's use of                           



                          biotin.                                

 

             Lab Interpretation (test Normal                                 



             code = 59577-6)                                        



[facility] [id number]18:39





             Test Item    Value        Reference Range Interpretation Comments

 

             VIT B12 (test code = 249 pg/mL    240-930                   



             [id number])                                         

 

             MAL (test code = MAL) Biotin has been                           



                          reported to cause a                           



                          positive bias,                           



                          interpret results                           



                          relative to                            



                          patient's use of                           



                          biotin.                                

 

             Lab Interpretation (test Normal                                 



             code = 51568-2)                                        



[id number] 17:30:28





             Test Item    Value        Reference Range Interpretation Comments

 

             VIT D 25OH (test code = 16 ng/mL     25-80        L            



             92270-4)                                            

 

             MAL (test code = MAL) Deficiency: <20                           



                          ng/mLInsufficiency:                           



                          20-24 ng/mLOptimal:                           



                          25-80 ng/mL                            

 

             Lab Interpretation (test Abnormal                               



             code = 27047-3)                                        



[id number] 17:30:28





             Test Item    Value        Reference Range Interpretation Comments

 

             VIT D 25OH (test code = 16 ng/mL     25-80        L            



             74115-2)                                            

 

             MAL (test code = MAL) Deficiency: <20                           



                          ng/mLInsufficiency:                           



                          20-24 ng/mLOptimal:                           



                          25-80 ng/mL                            

 

             Lab Interpretation (test Abnormal                               



             code = 78060-9)                                        



[facility]





             Test Item    Value        Reference    Interpretation Comments



                                       Range                     

 

             Procalcitonin (test 0.04 ng/mL   See_Comment                [Automa

huma



             code = 4587891012)                                        message] 

The



                                                                 system which



                                                                 generated this



                                                                 result



                                                                 transmitted



                                                                 reference



                                                                 range: <=0.07.



                                                                 The reference



                                                                 range was not



                                                                 used to



                                                                 interpret this



                                                                 result as



                                                                 normal/abnormal



                                                                 .

 

             MAL (test code = INTERPRETATION OF                           



             MAL)         PROCALCITONIN RESULTS                           



                          IN ADULTS >= 18 YEARS                           



                          OF AGE Initiation and                           



                          discontinuation of                           



                          antibiotics on                           



                          patients with                           



                          suspected or confirmed                           



                          Lower Respiratory                           



                          Tract Infection in                           



                          Adults >= 18 years of                           



                          age.                                   



                          +--------------+------                           



                          ----------+-----------                           



                          ----+-----------------                           



                          -----------+|Procalcit                           



                          onin |Interpretation                           



                          ?|Antibiotic ? ?                           



                          |Considerations ? ? ?                           



                          ? ? ? ? |ng/mL ? ? ? ?                           



                          | ? ? ? ? ? ? ?                           



                          ?|recommendation | ? ?                           



                          ? ? ? ? ? ? ? ? ? ? ?                           



                          ?                                      



                          +--------------+------                           



                          ----------+-----------                           



                          ----+-----------------                           



                          -----------+| <0.1 ? ?                           



                          ? ? | Bacterial ? ? ?|                           



                          Strongly ? ? ?| ? ? ?                           



                          ? ? ? ? ? ? ? ? ? ? ?                           



                          | ? ? ? ? ? ? ?|                           



                          infection very |                           



                          discouraged ? |                           



                          Overruling: ? ? ? ? ?                           



                          ? ? ? | ? ? ? ? ? ? ?|                           



                          unlikely ? ? ? | ? ? ?                           



                          ? ? ? ? | ? Clinically                           



                          unstable ? ? ?                           



                          +--------------+------                           



                          ----------+-----------                           



                          ----+ ? High risk for                           



                          adverse ? ? | <0.25 ?                           



                          ? ? ?| Bacterial ? ?                           



                          ?| Discouraged ? | ?                           



                          outcome ? ? ? ? ? ? ?                           



                          ? ? | ? ? ? ? ? ? ?|                           



                          infection ? ? ?| ? ? ?                           



                          ? ? ? ? | ? SEE                           



                          IMPORTANT NOTE ? ? ?                           



                          ?| ? ? ? ? ? ? ?|                           



                          unlikely ? ? ? | ? ? ?                           



                          ? ? ? ? | ? ? ? ? ? ?                           



                          ? ? ? ? ? ? ? ?                           



                          +--------------+------                           



                          ----------+-----------                           



                          ----+-----------------                           



                          -----------+| >=0.25 ?                           



                          ? ? | Bacterial ? ? ?|                           



                          Encouraged ? ?| ? ? ?                           



                          ? ? ? ? ? ? ? ? ? ? ?                           



                          | ? ? ? ? ? ? ?|                           



                          infection ? ? ?| ? ? ?                           



                          ? ? ? ? | ? ? ? ? ? ?                           



                          ? ? ? ? ? ? ? ? | ? ?                           



                          ? ? ? ? ?| likely ? ?                           



                          ? ? | ? ? ? ? ? ? ? |                           



                          Consider treatment                           



                          failure                                



                          ?+--------------+-----                           



                          -----------+----------                           



                          -----+ if levels does                           



                          not decrease | >0.5 ?                           



                          ? ? ? | Bacterial ? ?                           



                          ?| Strongly ? ? ?|                           



                          appropriately ? ? ? ?                           



                          ? ? ? | ? ? ? ? ? ? ?|                           



                          infection very |                           



                          encouraged ? ?| ? ? ?                           



                          ? ? ? ? ? ? ? ? ? ? ?                           



                          | ? ? ? ? ? ? ?|                           



                          likely ? ? ? ? | ? ? ?                           



                          ? ? ? ? | ? ? ? ? ? ?                           



                          ? ? ? ? ? ? ? ?                           



                          +--------------+------                           



                          ----------+-----------                           



                          ----+-----------------                           



                          -----------+                           



                          Discontinuation of                           



                          antibiotics in                           



                          high-acuity patients                           



                          with suspected or                           



                          confirmed sepsis in                           



                          Adults >= 18 years of                           



                          age.                                   



                          +--------------+------                           



                          ----------+-----------                           



                          ----+-----------------                           



                          -----------+|Procalcit                           



                          onin |Interpretation                           



                          ?|Antibiotic ? ?                           



                          |Considerations ? ? ?                           



                          ? ? ? ? |ng/mL ? ? ? ?                           



                          | ? ? ? ? ? ? ?                           



                          ?|recommendation | ? ?                           



                          ? ? ? ? ? ? ? ? ? ? ?                           



                          ?                                      



                          +--------------+------                           



                          ----------+-----------                           



                          ----+-----------------                           



                          -----------+| <0.25 ?                           



                          ? ? ?| Bacterial ? ?                           



                          ?| Strongly ? ? ?| ? ?                           



                          ? ? ? ? ? ? ? ? ? ? ?                           



                          ? | ? ? ? ? ? ? ?|                           



                          infection very |                           



                          discouraged ? |                           



                          Overruling: ? ? ? ? ?                           



                          ? ? ? | ? ? ? ? ? ? ?|                           



                          unlikely ? ? ? | ? ? ?                           



                          ? ? ? ? | ? Clinically                           



                          unstable ? ? ?                           



                          +--------------+------                           



                          ----------+-----------                           



                          ----+ ? High risk for                           



                          adverse ? ? | <0.5 or                           



                          drop | Bacterial ? ?                           



                          ?| Discouraged ? | ?                           



                          outcome ? ? ? ? ? ? ?                           



                          ? ? | >80% from ? ?|                           



                          infection ? ? ?| ? ? ?                           



                          ? ? ? ? | ? SEE                           



                          IMPORTANT NOTE ? ? ?                           



                          ?| highest PCT ?|                           



                          unlikely ? ? ? | ? ? ?                           



                          ? ? ? ? | ? ? ? ? ? ?                           



                          ? ? ? ? ? ? ? ? |                           



                          level ? ? ? ?| ? ? ? ?                           



                          ? ? ? ?| ? ? ? ? ? ? ?                           



                          | ? ? ? ? ? ? ? ? ? ?                           



                          ? ? ? ?                                



                          +--------------+------                           



                          ----------+-----------                           



                          ----+-----------------                           



                          -----------+| >=0.5 ?                           



                          ? ? ?| Bacterial ? ?                           



                          ?| Encouraged ? ?| ? ?                           



                          ? ? ? ? ? ? ? ? ? ? ?                           



                          ? | ? ? ? ? ? ? ?|                           



                          infection ? ? ?| ? ? ?                           



                          ? ? ? ? | ? ? ? ? ? ?                           



                          ? ? ? ? ? ? ? ? | ? ?                           



                          ? ? ? ? ?| likely ? ?                           



                          ? ? | ? ? ? ? ? ? ? |                           



                          Consider treatment                           



                          failure                                



                          ?+--------------+-----                           



                          -----------+----------                           



                          -----+ if levels does                           



                          not decrease | >1.0 ?                           



                          ? ? ? | Bacterial ? ?                           



                          ?| Strongly ? ? ?|                           



                          appropriately ? ? ? ?                           



                          ? ? ? | ? ? ? ? ? ? ?|                           



                          infection very |                           



                          encouraged ? ?| ? ? ?                           



                          ? ? ? ? ? ? ? ? ? ? ?                           



                          | ? ? ? ? ? ? ?|                           



                          likely ? ? ? ? | ? ? ?                           



                          ? ? ? ? | ? ? ? ? ? ?                           



                          ? ? ? ? ? ? ? ?                           



                          +--------------+------                           



                          ----------+-----------                           



                          ----+-----------------                           



                          -----------+                           



                          Percentage of drop of                           



                          Procalcitonin                           



                          calculation for                           



                          Discontinuation of                           



                          antibiotics in                           



                          high-acuity patients                           



                          with suspected or                           



                          confirmed sepsis in                           



                          Adults >= 18 years of                           



                          age. ? ? ? ? ? ? ? ? ?                           



                          ? ? Procalcitonin                           



                          highest{}-Procalcitoni                           



                          n current{}Delta                           



                          Procalcitonin =                           



                          ______________________                           



                          ______________________                           



                          ___ x100% ? ? ? ? ? ?                           



                          ? ? ? ? ? ? ? ? ? ?                           



                          Procalcitonin current                           



                          {} IMPORTANT NOTE:                           



                          Procalcitonin may be                           



                          elevated without                           



                          bacterial infection by                           



                          physiologic stress                           



                          related to trauma,                           



                          burns, chronic                           



                          dialysis, metastatic                           



                          cancer, surgery in the                           



                          past seven days,                           



                          malaria, some fungal                           



                          infections, and some                           



                          forms of vasculitis.                           



                          The interpretation                           



                          algorithm may not                           



                          apply to patients with                           



                          immunosuppression                           



                          (equivalent of >10 mg                           



                          of prednisone daily),                           



                          HIV with CD4 cell                           



                          count < 350 cells/mm3,                           



                          active malignancy on                           



                          systemic chemotherapy,                           



                          solid organ transplant                           



                          or hematopoietic stem                           



                          cell transplantation,                           



                          or hospital acquired                           



                          pneumonia.                             



                          Additionally, some                           



                          clinical trials of                           



                          procalcitonin have                           



                          excluded patients with                           



                          shock requiring                           



                          vasopressor use, acute                           



                          respiratory failure                           



                          requiring mechanical                           



                          ventilation, or those                           



                          with known lung                           



                          abscess/empyema. For                           



                          further information                           



                          please refer                           



                          to:[email or web address]                           



                          [email or web address]                           



                          p                                      

 

             Lab Interpretation Normal                                 



             (test code =                                        



             78562-9)                                            



[facility]





             Test Item    Value        Reference Range Interpretation Comments

 

             Procalcitonin (test 0.04 ng/mL   <0.07                     



             code = 6207427492)                                        

 

             MAL (test code = MAL) INTERPRETATION OF                           



                          PROCALCITONIN RESULTS IN                           



                          ADULTS >= 18 YEARS OF                           



                          AGE Initiation and                           



                          discontinuation of                           



                          antibiotics on patients                           



                          with suspected or                           



                          confirmed Lower                           



                          Respiratory Tract                           



                          Infection in Adults >=                           



                          18 years of age.                           



                          +--------------+--------                           



                          --------+---------------                           



                          +-----------------------                           



                          -----+|Procalcitonin                           



                          |Interpretation                           



                          ?|Antibiotic ? ?                           



                          |Considerations ? ? ? ?                           



                          ? ? ? |ng/mL ? ? ? ? | ?                           



                          ? ? ? ? ? ?                            



                          ?|recommendation | ? ? ?                           



                          ? ? ? ? ? ? ? ? ? ? ?                           



                          +--------------+--------                           



                          --------+---------------                           



                          +-----------------------                           



                          -----+| <0.1 ? ? ? ? |                           



                          Bacterial ? ? ?|                           



                          Strongly ? ? ?| ? ? ? ?                           



                          ? ? ? ? ? ? ? ? ? ? | ?                           



                          ? ? ? ? ? ?| infection                           



                          very | discouraged ? |                           



                          Overruling: ? ? ? ? ? ?                           



                          ? ? | ? ? ? ? ? ? ?|                           



                          unlikely ? ? ? | ? ? ? ?                           



                          ? ? ? | ? Clinically                           



                          unstable ? ? ?                           



                          +--------------+--------                           



                          --------+---------------                           



                          + ? High risk for                           



                          adverse ? ? | <0.25 ? ?                           



                          ? ?| Bacterial ? ? ?|                           



                          Discouraged ? | ?                           



                          outcome ? ? ? ? ? ? ? ?                           



                          ? | ? ? ? ? ? ? ?|                           



                          infection ? ? ?| ? ? ? ?                           



                          ? ? ? | ? SEE IMPORTANT                           



                          NOTE ? ? ? ?| ? ? ? ? ?                           



                          ? ?| unlikely ? ? ? | ?                           



                          ? ? ? ? ? ? | ? ? ? ? ?                           



                          ? ? ? ? ? ? ? ? ?                           



                          +--------------+--------                           



                          --------+---------------                           



                          +-----------------------                           



                          -----+| >=0.25 ? ? ? |                           



                          Bacterial ? ? ?|                           



                          Encouraged ? ?| ? ? ? ?                           



                          ? ? ? ? ? ? ? ? ? ? | ?                           



                          ? ? ? ? ? ?| infection ?                           



                          ? ?| ? ? ? ? ? ? ? | ? ?                           



                          ? ? ? ? ? ? ? ? ? ? ? ?                           



                          | ? ? ? ? ? ? ?| likely                           



                          ? ? ? ? | ? ? ? ? ? ? ?                           



                          | Consider treatment                           



                          failure                                



                          ?+--------------+-------                           



                          ---------+--------------                           



                          -+ if levels does not                           



                          decrease | >0.5 ? ? ? ?                           



                          | Bacterial ? ? ?|                           



                          Strongly ? ? ?|                           



                          appropriately ? ? ? ? ?                           



                          ? ? | ? ? ? ? ? ? ?|                           



                          infection very |                           



                          encouraged ? ?| ? ? ? ?                           



                          ? ? ? ? ? ? ? ? ? ? | ?                           



                          ? ? ? ? ? ?| likely ? ?                           



                          ? ? | ? ? ? ? ? ? ? | ?                           



                          ? ? ? ? ? ? ? ? ? ? ? ?                           



                          ?                                      



                          +--------------+--------                           



                          --------+---------------                           



                          +-----------------------                           



                          -----+ Discontinuation                           



                          of antibiotics in                           



                          high-acuity patients                           



                          with suspected or                           



                          confirmed sepsis in                           



                          Adults >= 18 years of                           



                          age.                                   



                          +--------------+--------                           



                          --------+---------------                           



                          +-----------------------                           



                          -----+|Procalcitonin                           



                          |Interpretation                           



                          ?|Antibiotic ? ?                           



                          |Considerations ? ? ? ?                           



                          ? ? ? |ng/mL ? ? ? ? | ?                           



                          ? ? ? ? ? ?                            



                          ?|recommendation | ? ? ?                           



                          ? ? ? ? ? ? ? ? ? ? ?                           



                          +--------------+--------                           



                          --------+---------------                           



                          +-----------------------                           



                          -----+| <0.25 ? ? ? ?|                           



                          Bacterial ? ? ?|                           



                          Strongly ? ? ?| ? ? ? ?                           



                          ? ? ? ? ? ? ? ? ? ? | ?                           



                          ? ? ? ? ? ?| infection                           



                          very | discouraged ? |                           



                          Overruling: ? ? ? ? ? ?                           



                          ? ? | ? ? ? ? ? ? ?|                           



                          unlikely ? ? ? | ? ? ? ?                           



                          ? ? ? | ? Clinically                           



                          unstable ? ? ?                           



                          +--------------+--------                           



                          --------+---------------                           



                          + ? High risk for                           



                          adverse ? ? | <0.5 or                           



                          drop | Bacterial ? ? ?|                           



                          Discouraged ? | ?                           



                          outcome ? ? ? ? ? ? ? ?                           



                          ? | >80% from ? ?|                           



                          infection ? ? ?| ? ? ? ?                           



                          ? ? ? | ? SEE IMPORTANT                           



                          NOTE ? ? ? ?| highest                           



                          PCT ?| unlikely ? ? ? |                           



                          ? ? ? ? ? ? ? | ? ? ? ?                           



                          ? ? ? ? ? ? ? ? ? ? |                           



                          level ? ? ? ?| ? ? ? ? ?                           



                          ? ? ?| ? ? ? ? ? ? ? | ?                           



                          ? ? ? ? ? ? ? ? ? ? ? ?                           



                          ?                                      



                          +--------------+--------                           



                          --------+---------------                           



                          +-----------------------                           



                          -----+| >=0.5 ? ? ? ?|                           



                          Bacterial ? ? ?|                           



                          Encouraged ? ?| ? ? ? ?                           



                          ? ? ? ? ? ? ? ? ? ? | ?                           



                          ? ? ? ? ? ?| infection ?                           



                          ? ?| ? ? ? ? ? ? ? | ? ?                           



                          ? ? ? ? ? ? ? ? ? ? ? ?                           



                          | ? ? ? ? ? ? ?| likely                           



                          ? ? ? ? | ? ? ? ? ? ? ?                           



                          | Consider treatment                           



                          failure                                



                          ?+--------------+-------                           



                          ---------+--------------                           



                          -+ if levels does not                           



                          decrease | >1.0 ? ? ? ?                           



                          | Bacterial ? ? ?|                           



                          Strongly ? ? ?|                           



                          appropriately ? ? ? ? ?                           



                          ? ? | ? ? ? ? ? ? ?|                           



                          infection very |                           



                          encouraged ? ?| ? ? ? ?                           



                          ? ? ? ? ? ? ? ? ? ? | ?                           



                          ? ? ? ? ? ?| likely ? ?                           



                          ? ? | ? ? ? ? ? ? ? | ?                           



                          ? ? ? ? ? ? ? ? ? ? ? ?                           



                          ?                                      



                          +--------------+--------                           



                          --------+---------------                           



                          +-----------------------                           



                          -----+ Percentage of                           



                          drop of Procalcitonin                           



                          calculation for                           



                          Discontinuation of                           



                          antibiotics in                           



                          high-acuity patients                           



                          with suspected or                           



                          confirmed sepsis in                           



                          Adults >= 18 years of                           



                          age. ? ? ? ? ? ? ? ? ? ?                           



                          ? Procalcitonin                           



                          highest{}-Procalcitonin                           



                          current{}Delta                           



                          Procalcitonin =                           



                          ________________________                           



                          _______________________                           



                          x100% ? ? ? ? ? ? ? ? ?                           



                          ? ? ? ? ? ? ?                           



                          Procalcitonin current {}                           



                          IMPORTANT NOTE:                           



                          Procalcitonin may be                           



                          elevated without                           



                          bacterial infection by                           



                          physiologic stress                           



                          related to trauma,                           



                          burns, chronic dialysis,                           



                          metastatic cancer,                           



                          surgery in the past                           



                          seven days, malaria,                           



                          some fungal infections,                           



                          and some forms of                           



                          vasculitis. The                           



                          interpretation algorithm                           



                          may not apply to                           



                          patients with                           



                          immunosuppression                           



                          (equivalent of >10 mg of                           



                          prednisone daily), HIV                           



                          with CD4 cell count <                           



                          350 cells/mm3, active                           



                          malignancy on systemic                           



                          chemotherapy, solid                           



                          organ transplant or                           



                          hematopoietic stem cell                           



                          transplantation, or                           



                          hospital acquired                           



                          pneumonia. Additionally,                           



                          some clinical trials of                           



                          procalcitonin have                           



                          excluded patients with                           



                          shock requiring                           



                          vasopressor use, acute                           



                          respiratory failure                           



                          requiring mechanical                           



                          ventilation, or those                           



                          with known lung                           



                          abscess/empyema. For                           



                          further information                           



                          please refer                           



                          to:[email or web address]                           



                          [email or web address]                           



                          [email or web address]                           

 

             Lab Interpretation Normal                                 



             (test code = 97555-2)                                        



[id number]21:10





             Test Item    Value        Reference Range Interpretation Comments

 

             ESR (test code =              See_Comment  H             [Automated

 message]



             [id number])                                         The system [employer]



                                                                 generated this 

result



                                                                 transmitted ref

erence



                                                                 range: 0 - 10 m

m/HR.



                                                                 The reference r

zhen



                                                                 was not used to



                                                                 interpret this 

result



                                                                 as normal/abnor

mal.

 

             Lab Interpretation (test Abnormal                               



             code = 17197-1)                                        



[facility] 
13:12:27





             Test Item    Value        Reference Range Interpretation Comments

 

             HGB A1C (test code = 6.3 %        4.0-5.7      H            



             4548-4)                                             

 

             MAL (test code = MAL) Reference                              



                          RangesNormal:                           



                          <5.7%Prediabetes:                           



                          5.7 - 6.4%Diabetes:                           



                          > 6.5%                                 

 

             Lab Interpretation (test Abnormal                               



             code = 08885-8)                                        



[id number] 11:53:14





             Test Item    Value        Reference Range Interpretation Comments

 

             NT-proBNP (test code 16 pg/mL     See_Comment                [Autom

ated



             = [id number])                                        message] The



                                                                 system which



                                                                 generated this



                                                                 result



                                                                 transmitted



                                                                 reference range

:



                                                                 <=125. The



                                                                 reference range



                                                                 was not used to



                                                                 interpret this



                                                                 result as



                                                                 normal/abnormal

.

 

             MAL (test code = MAL) Biotin has been                           



                          reported to                            



                          cause a negative                           



                          bias, interpret                           



                          results relative                           



                          to patient's use                           



                          of biotin.                             

 

             Lab Interpretation Normal                                 



             (test code = 86650-5)                                        



[id number] 
11:44:55





             Test Item    Value        Reference Range Interpretation Comments

 

             NA (test code = 142 mmol/L   135-145                   



             7773606962)                                         

 

             K (test code = 3.9 mmol/L   3.5-5.0                   



             4528436752)                                         

 

             CL (test code = 108 mmol/L                       



             3467345247)                                         

 

             CO2 TOTAL (test code = 24 mmol/L    23-31                     



             5237016921)                                         

 

             AGAP (test code =              2-16                      



             3313418125)                                         

 

             BUN (test code = 11 mg/dL     7-23                      



             0230224456)                                         

 

             GLUCOSE (test code = 205 mg/dL           H            



             5750427519)                                         

 

             CREATININE (test code = 0.71 mg/dL   0.60-1.25                 



             0564189474)                                         

 

             TOTAL BILI (test code = 0.7 mg/dL    0.1-1.1                   



             3714940673)                                         

 

             CALCIUM (test code = 8.9 mg/dL    8.6-10.6                  



             8664217691)                                         

 

             T PROTEIN (test code = 7.3 g/dL     6.3-8.2                   



             9018260288)                                         

 

             ALBUMIN (test code = 3.8 g/dL     3.5-5.0                   



             7074245470)                                         

 

             ALK PHOS (test code = 135 U/L             H            



             5282068456)                                         

 

             ALTv (test code = 120 U/L      5-50         H            



             2-6)                                             

 

             AST(SGOT) (test code = 33 U/L       13-40                     



             8867066027)                                         

 

             eGFR (test code =              mL/min/1.73m2              



             7561533090)                                         

 

             MAL (test code = MAL) Association of                           



                          Glomerular Filtration                           



                          Rate (GFR) and Staging                           



                          of Kidney Disease*                           



                          +---------------------                           



                          --+-------------------                           



                          --+-------------------                           



                          ------+| GFR                           



                          (mL/min/1.73 m2) ?|                           



                          With Kidney Damage ?|                           



                          ?Without Kidney                           



                          Damage+---------------                           



                          --------+-------------                           



                          --------+-------------                           



                          ------------+| ?>90 ?                           



                          ? ? ? ? ? ? ? ?|                           



                          ?Stage one ? ? ? ? ?|                           



                          ? Normal ? ? ? ? ? ? ?                           



                          ?+--------------------                           



                          ---+------------------                           



                          ---+------------------                           



                          -------+| ?60-89 ? ? ?                           



                          ? ? ? ? ?| ?Stage two                           



                          ? ? ? ? ?| ? Decreased                           



                          GFR ? ? ? ?                            



                          +---------------------                           



                          --+-------------------                           



                          --+-------------------                           



                          ------+| ?30-59 ? ? ?                           



                          ? ? ? ? ?| ?Stage                           



                          three ? ? ? ?| ? Stage                           



                          three ? ? ? ? ?                           



                          +---------------------                           



                          --+-------------------                           



                          --+-------------------                           



                          ------+| ?15-29 ? ? ?                           



                          ? ? ? ? ?| ?Stage four                           



                          ? ? ? ? | ? Stage four                           



                          ? ? ? ? ?                              



                          ?+--------------------                           



                          ---+------------------                           



                          ---+------------------                           



                          -------+| ?<15 (or                           



                          dialysis) ? ?| ?Stage                           



                          five ? ? ? ? | ? Stage                           



                          five ? ? ? ? ?                           



                          ?+--------------------                           



                          ---+------------------                           



                          ---+------------------                           



                          -------+ *Each stage                           



                          assumes the associated                           



                          GFR level has been in                           



                          effect for at least                           



                          three months. ?Stages                           



                          1 to 5, with or                           



                          without kidney                           



                          disease, indicate                           



                          chronic kidney                           



                          disease. Notes:                           



                          Determination of                           



                          stages one and two                           



                          (with eGFR                             



                          >59mL/min/1.73 m2)                           



                          requires estimation of                           



                          kidney damage for at                           



                          least three months as                           



                          defined by structural                           



                          or functional                           



                          abnormalities of the                           



                          kidney, manifested by                           



                          either:Pathological                           



                          abnormalities or                           



                          Markers of kidney                           



                          damage (including                           



                          abnormalities in the                           



                          composition of the                           



                          blood or urine or                           



                          abnormalities in                           



                          imaging tests).                           

 

             Lab Interpretation Abnormal                               



             (test code = 05153-5)                                        



[facility] 11[facility]44[facility]55





             Test Item    Value        Reference Range Interpretation Comments

 

             MAGNESIUM (test code = 8325297891) 1.6 mg/dL    1.7-2.4      L     

       

 

             Lab Interpretation (test code = Abnormal                           

    



             17585-4)                                            



[facility] 11[facility]44:35





             Test Item    Value        Reference Range Interpretation Comments

 

             PHOSPHORUS (test code = 8517837778) 4.1 mg/dL    2.5-5.0           

        

 

             Lab Interpretation (test code = Normal                             

    



             82604-0)                                            



[facility] [id number] 11:44:15





             Test Item    Value        Reference Range Interpretation Comments

 

             CK (test code = 0560600131) 50 U/L                           

 

             Lab Interpretation (test code = Normal                             

    



             57721-5)                                            



[facility] [id number] 11:44:15





             Test Item    Value        Reference Range Interpretation Comments

 

             CK (test code = 3262647110) 50 U/L                           

 

             Lab Interpretation (test code = Normal                             

    



             17419-0)                                            



[facility] [id number] 11:28:14





             Test Item    Value        Reference Range Interpretation Comments

 

             WBC (test code =              See_Comment                [Automated



             5293-2)                                             message] The sy

stem



                                                                 which generated



                                                                 this result



                                                                 transmitted



                                                                 reference range

:



                                                                 4.20 - 10.70



                                                                 10*3/?L. The



                                                                 reference range

 was



                                                                 not used to



                                                                 interpret this



                                                                 result as



                                                                 normal/abnormal

.

 

             RBC (test code =              See_Comment                [Automated



             185-8)                                              message] The sy

stem



                                                                 which generated



                                                                 this result



                                                                 transmitted



                                                                 reference range

:



                                                                 4.26 - 5.52



                                                                 10*6/?L. The



                                                                 reference range

 was



                                                                 not used to



                                                                 interpret this



                                                                 result as



                                                                 normal/abnormal

.

 

             HGB (test code = 15.0 g/dL    12.2-16.4                 



             718-7)                                              

 

             HCT (test code = 44.6 %       38.4-49.3                 



             4544-3)                                             

 

             MCV (test code = 87.6 fL      81.7-95.6                 



             787-2)                                              

 

             MCH (test code = 29.5 pg      26.1-32.7                 



             785-6)                                              

 

             MCHC (test code = 33.6 g/dL    31.2-35.0                 



             786-4)                                              

 

             RDW-SD (test code = 48.4 fL      38.5-51.6                 



             44249-6)                                            

 

             RDW-CV (test code = 15.1 %       12.1-15.4                 



             788-0)                                              

 

             PLT (test code =              See_Comment                [Automated



             777-3)                                              message] The sy

stem



                                                                 which generated



                                                                 this result



                                                                 transmitted



                                                                 reference range

:



                                                                 150 - 328 10*3/

?L.



                                                                 The reference r

zhen



                                                                 was not used to



                                                                 interpret this



                                                                 result as



                                                                 normal/abnormal

.

 

             MPV (test code = 10.2 fL      9.8-13.0                  



             17400-4)                                            

 

             NRBC/100 WBC (test              See_Comment                [Automat

ed



             code = 0703682928)                                        message] 

The system



                                                                 which generated



                                                                 this result



                                                                 transmitted



                                                                 reference range

:



                                                                 0.0 - 10.0 /100



                                                                 WBCs. The refer

ence



                                                                 range was not u

sed



                                                                 to interpret th

is



                                                                 result as



                                                                 normal/abnormal

.

 

             NRBC x10^3 (test code <0.01        See_Comment                [Auto

mated



             = 3045111662)                                        message] The s

ystem



                                                                 which generated



                                                                 this result



                                                                 transmitted



                                                                 reference range

:



                                                                 10*3/?L. The



                                                                 reference range

 was



                                                                 not used to



                                                                 interpret this



                                                                 result as



                                                                 normal/abnormal

.

 

             GRAN MAT (NEUT) % 67.8 %                                 



             (test code = 770-8)                                        

 

             IMM GRAN % (test code 0.80 %                                 



             = 9260435502)                                        

 

             LYMPH % (test code = 19.8 %                                 



             736-9)                                              

 

             MONO % (test code = 8.7 %                                  



             5905-5)                                             

 

             EOS % (test code = 2.7 %                                  



             713-8)                                              

 

             BASO % (test code = 0.2 %                                  



             706-2)                                              

 

             GRAN MAT x10^3(ANC) 6.56 10*3/uL 1.99-6.95                 



             (test code =                                        



             4496268166)                                         

 

             IMM GRAN x10^3 (test 0.08 10*3/uL 0.00-0.06    H            



             code = 3861563847)                                        

 

             LYMPH x10^3 (test code 1.91 10*3/uL 1.09-3.23                 



             = 731-0)                                            

 

             MONO x10^3 (test code 0.84 10*3/uL 0.36-1.02                 



             = 742-7)                                            

 

             EOS x10^3 (test code = 0.26 10*3/uL 0.06-0.53                 



             711-2)                                              

 

             BASO x10^3 (test code <0.03        0.01-0.09                 



             = 704-7)                                            

 

             Lab Interpretation Abnormal                               



             (test code = 43231-9)                                        



[facility] [id number] 10:56:10





             Test Item    Value        Reference Range Interpretation Comments

 

             IRON (test code = 7795839546) 46 ug/dL            L          

  

 

             TIBC (test code = 8553650730) 299 ug/dL    250-410                 

  

 

             % FE SAT (test code = 5601838026) 15 %         20-50        L      

      

 

             Lab Interpretation (test code = Abnormal                           

    



             17420-1)                                            



[facility] [id number] 10:56:10





             Test Item    Value        Reference Range Interpretation Comments

 

             IRON (test code = 0106779718) 46 ug/dL            L          

  

 

             TIBC (test code = 3740640040) 299 ug/dL    250-410                 

  

 

             % FE SAT (test code = 7855678923) 15 %         20-50        L      

      

 

             Lab Interpretation (test code = Abnormal                           

    



             95929-2)                                            



[facility] [id number] 10:13:03





             Test Item    Value        Reference Range Interpretation Comments

 

             FERRITIN (test code = 89.2 ng/mL                       



             5442350059)                                         

 

             MAL (test code = MAL) Biotin has been                           



                          reported to cause a                           



                          negative bias,                           



                          interpret results                           



                          relative to                            



                          patient's use of                           



                          biotin.                                

 

             Lab Interpretation (test Normal                                 



             code = 18657-1)                                        



[facility] [id number] 10:13:03





             Test Item    Value        Reference Range Interpretation Comments

 

             FERRITIN (test code = 89.2 ng/mL   18.0-464.0                



             9687855686)                                         

 

             MAL (test code = MAL) Biotin has been                           



                          reported to cause a                           



                          negative bias,                           



                          interpret results                           



                          relative to                            



                          patient's use of                           



                          biotin.                                

 

             Lab Interpretation (test Normal                                 



             code = 21614-9)                                        



[facility] [id number] 10:09:06





             Test Item    Value        Reference Range Interpretation Comments

 

             TSH (test code =              See_Comment                [Automated

 message]



             9505774504)                                         The system [employer]



                                                                 generated this 

result



                                                                 transmitted ref

erence



                                                                 range: 0.45 - 4

.70



                                                                 mIU/L. The refe

rence



                                                                 range was not u

sed to



                                                                 interpret this 

result



                                                                 as normal/abnor

mal.

 

             Lab Interpretation (test Normal                                 



             code = 44255-3)                                        



[facility] [id number] 10:09:06





             Test Item    Value        Reference Range Interpretation Comments

 

             TSH (test code =              See_Comment                [Automated

 message]



             4220497875)                                         The system [employer]



                                                                 generated this 

result



                                                                 transmitted ref

erence



                                                                 range: 0.45 - 4

.70



                                                                 mIU/L. The refe

rence



                                                                 range was not u

sed to



                                                                 interpret this 

result



                                                                 as normal/abnor

mal.

 

             Lab Interpretation (test Normal                                 



             code = 89773-4)                                        



[facility]AL [id number] 09:47:44





             Test Item    Value        Reference Range Interpretation Comments

 

             NT-proBNP (test code 12 pg/mL     See_Comment                [Autom

ated



             = 4928213789)                                        message] The



                                                                 system which



                                                                 generated this



                                                                 result



                                                                 transmitted



                                                                 reference range

:



                                                                 <=125. The



                                                                 reference range



                                                                 was not used to



                                                                 interpret this



                                                                 result as



                                                                 normal/abnormal

.

 

             MAL (test code = MAL) Biotin has been                           



                          reported to                            



                          cause a negative                           



                          bias, interpret                           



                          results relative                           



                          to patient's use                           



                          of biotin.                             

 

             Lab Interpretation Normal                                 



             (test code = 51754-4)                                        



[id number] CHOLESTEROL, 
TRIGLYCERIDES, HDL)2022 09:35:58





             Test Item    Value        Reference Range Interpretation Comments

 

             CHOL (test code = 250 mg/dL    120-200      H            



             [id number])                                         

 

             HDL (test code = 34 mg/dL     See_Comment  L             [Automated

 message]



             6403761827)                                         The system [employer]



                                                                 generated this



                                                                 result transmit

huma



                                                                 reference range

:



                                                                 >=40. The refer

ence



                                                                 range was not u

sed



                                                                 to interpret th

is



                                                                 result as



                                                                 normal/abnormal

.

 

             HDLC RATIO (test code =              See_Comment  H             [Au

tomated message]



             [id number])                                         The system [employer]



                                                                 generated this



                                                                 result transmit

huma



                                                                 reference range

:



                                                                 <=5.0. The refe

rence



                                                                 range was not u

sed



                                                                 to interpret th

is



                                                                 result as



                                                                 normal/abnormal

.

 

             TRIG (test code = 394 mg/dL           H            



             [id number])                                         

 

             LDL CHOL (test code = 137 mg/dL    See_Comment                [Auto

mated message]



             25728-8)                                            The system [employer]



                                                                 generated this



                                                                 result transmit

huma



                                                                 reference range

:



                                                                 <=160. The refe

rence



                                                                 range was not u

sed



                                                                 to interpret th

is



                                                                 result as



                                                                 normal/abnormal

.

 

             VLDL (test code = 79 mg/dL     5-60         H            



             1828284373)                                         

 

             Lab Interpretation (test Abnormal                               



             code = 29775-0)                                        



[id number]TOTAL CHOLESTEROL, 
TRIGLYCERIDES, HDL)2022 09:35:58





             Test Item    Value        Reference Range Interpretation Comments

 

             CHOL (test code = 250 mg/dL    120-200      H            



             3115836950)                                         

 

             HDL (test code = 34 mg/dL     >40          L            



             8674527205)                                         

 

             HDLC RATIO (test code =              See_Comment  H             [Au

tomated message]



             3013338316)                                         The system [employer]



                                                                 generated this



                                                                 result transmit

huma



                                                                 reference range

:



                                                                 <=5.0. The refe

rence



                                                                 range was not u

sed



                                                                 to interpret th

is



                                                                 result as



                                                                 normal/abnormal

.

 

             TRIG (test code = 394 mg/dL           H            



             5815459108)                                         

 

             LDL CHOL (test code = 137 mg/dL    See_Comment                [Auto

mated message]



             93777-0)                                            The system [employer]



                                                                 generated this



                                                                 result transmit

huma



                                                                 reference range

:



                                                                 <=160. The refe

rence



                                                                 range was not u

sed



                                                                 to interpret th

is



                                                                 result as



                                                                 normal/abnormal

.

 

             VLDL (test code = 79 mg/dL     5-60         H            



             3674797902)                                         

 

             Lab Interpretation (test Abnormal                               



             code = 02494-3)                                        



[facility]





             Test Item    Value        Reference Range Interpretation Comments

 

             MAGNESIUM (test code = 1717865228) 1.5 mg/dL    1.7-2.4      L     

       

 

             Lab Interpretation (test code = Abnormal                           

    



             12070-2)                                            



[facility]





             Test Item    Value        Reference Range Interpretation Comments

 

             PHOSPHORUS (test code = 3319608287) 3.3 mg/dL    2.5-5.0           

        

 

             Lab Interpretation (test code = Normal                             

    



             60051-5)                                            



[id number] 
01:29:09





             Test Item    Value        Reference Range Interpretation Comments

 

             NA (test code = 142 mmol/L   135-145                   



             0379649693)                                         

 

             K (test code = 3.7 mmol/L   3.5-5.0                   



             3904088240)                                         

 

             CL (test code = 103 mmol/L                       



             6226260253)                                         

 

             CO2 TOTAL (test code = 27 mmol/L    23-31                     



             9919174120)                                         

 

             AGAP (test code =              2-16                      



             5733340332)                                         

 

             BUN (test code = 11 mg/dL     7-23                      



             0128735334)                                         

 

             GLUCOSE (test code = 166 mg/dL           H            



             5606665126)                                         

 

             CREATININE (test code = 0.61 mg/dL   0.60-1.25                 



             5216490480)                                         

 

             TOTAL BILI (test code = 0.8 mg/dL    0.1-1.1                   



             3013645462)                                         

 

             CALCIUM (test code = 9.3 mg/dL    8.6-10.6                  



             4722142046)                                         

 

             T PROTEIN (test code = 7.4 g/dL     6.3-8.2                   



             9390710812)                                         

 

             ALBUMIN (test code = 4.0 g/dL     3.5-5.0                   



             9254798907)                                         

 

             ALK PHOS (test code = 164 U/L             H            



             9089981477)                                         

 

             ALTv (test code = 149 U/L      5-50         H            



             1742-6)                                             

 

             AST(SGOT) (test code = 29 U/L       13-40                     



             4711932814)                                         

 

             eGFR (test code =              mL/min/1.73m2              



             6159558947)                                         

 

             MAL (test code = MAL) Association of                           



                          Glomerular Filtration                           



                          Rate (GFR) and Staging                           



                          of Kidney Disease*                           



                          +---------------------                           



                          --+-------------------                           



                          --+-------------------                           



                          ------+| GFR                           



                          (mL/min/1.73 m2) ?|                           



                          With Kidney Damage ?|                           



                          ?Without Kidney                           



                          Damage+---------------                           



                          --------+-------------                           



                          --------+-------------                           



                          ------------+| ?>90 ?                           



                          ? ? ? ? ? ? ? ?|                           



                          ?Stage one ? ? ? ? ?|                           



                          ? Normal ? ? ? ? ? ? ?                           



                          ?+--------------------                           



                          ---+------------------                           



                          ---+------------------                           



                          -------+| ?60-89 ? ? ?                           



                          ? ? ? ? ?| ?Stage two                           



                          ? ? ? ? ?| ? Decreased                           



                          GFR ? ? ? ?                            



                          +---------------------                           



                          --+-------------------                           



                          --+-------------------                           



                          ------+| ?30-59 ? ? ?                           



                          ? ? ? ? ?| ?Stage                           



                          three ? ? ? ?| ? Stage                           



                          three ? ? ? ? ?                           



                          +---------------------                           



                          --+-------------------                           



                          --+-------------------                           



                          ------+| ?15-29 ? ? ?                           



                          ? ? ? ? ?| ?Stage four                           



                          ? ? ? ? | ? Stage four                           



                          ? ? ? ? ?                              



                          ?+--------------------                           



                          ---+------------------                           



                          ---+------------------                           



                          -------+| ?<15 (or                           



                          dialysis) ? ?| ?Stage                           



                          five ? ? ? ? | ? Stage                           



                          five ? ? ? ? ?                           



                          ?+--------------------                           



                          ---+------------------                           



                          ---+------------------                           



                          -------+ *Each stage                           



                          assumes the associated                           



                          GFR level has been in                           



                          effect for at least                           



                          three months. ?Stages                           



                          1 to 5, with or                           



                          without kidney                           



                          disease, indicate                           



                          chronic kidney                           



                          disease. Notes:                           



                          Determination of                           



                          stages one and two                           



                          (with eGFR                             



                          >59mL/min/1.73 m2)                           



                          requires estimation of                           



                          kidney damage for at                           



                          least three months as                           



                          defined by structural                           



                          or functional                           



                          abnormalities of the                           



                          kidney, manifested by                           



                          either:Pathological                           



                          abnormalities or                           



                          Markers of kidney                           



                          damage (including                           



                          abnormalities in the                           



                          composition of the                           



                          blood or urine or                           



                          abnormalities in                           



                          imaging tests).                           

 

             Lab Interpretation Abnormal                               



             (test code = 03638-8)                                        



[facility] [id number] 
01:23:46





             Test Item    Value        Reference Range Interpretation Comments

 

             APTT Patient (test              See_Comment                [Automat

ed



             code = 3173-2)                                        message] The



                                                                 system which



                                                                 generated this



                                                                 result



                                                                 transmitted



                                                                 reference range

:



                                                                 23 - 38 Seconds

.



                                                                 The reference



                                                                 range was not



                                                                 used to interpr

et



                                                                 this result as



                                                                 normal/abnormal

.

 

             MAL (test code = MAL) The [facility] patient                           



                          population mean                           



                          normal value for                           



                          aPTT is 30                             



                          seconds.                               

 

             Lab Interpretation Normal                                 



             (test code = 16243-4)                                        



[facility] [id number] 01:21:51





             Test Item    Value        Reference Range Interpretation Comments

 

             PROTIME PATIENT (test              See_Comment                [Auto

mated message]



             code = 5964-2)                                        The system wh

ich



                                                                 generated this 

result



                                                                 transmitted ref

erence



                                                                 range: 12.0 - 1

4.7



                                                                 Seconds. The re

ference



                                                                 range was not u

sed to



                                                                 interpret this 

result



                                                                 as normal/abnor

mal.

 

             INR (test code = 6301-6)                                        Nor

mal INR <1.1;



                                                                 Warfarin Therap

eutic



                                                                 range 2.0 to 3.

0 or



                                                                 2.5 to 3.5, dep

ending



                                                                 upon the indica

tions.

 

             Lab Interpretation (test Normal                                 



             code = 84763-7)                                        



[facility] [id number] 01:15:28





             Test Item    Value        Reference Range Interpretation Comments

 

             WBC (test code =              See_Comment  H             [Automated



             4490-2)                                             message] The sy

stem



                                                                 which generated



                                                                 this result



                                                                 transmitted



                                                                 reference range

:



                                                                 4.20 - 10.70



                                                                 10*3/?L. The



                                                                 reference range

 was



                                                                 not used to



                                                                 interpret this



                                                                 result as



                                                                 normal/abnormal

.

 

             RBC (test code =              See_Comment  H             [Automated



             229-8)                                              message] The sy

stem



                                                                 which generated



                                                                 this result



                                                                 transmitted



                                                                 reference range

:



                                                                 4.26 - 5.52



                                                                 10*6/?L. The



                                                                 reference range

 was



                                                                 not used to



                                                                 interpret this



                                                                 result as



                                                                 normal/abnormal

.

 

             HGB (test code = 16.5 g/dL    12.2-16.4    H            



             718-7)                                              

 

             HCT (test code = 49.1 %       38.4-49.3                 



             4544-3)                                             

 

             MCV (test code = 87.7 fL      81.7-95.6                 



             787-2)                                              

 

             MCH (test code = 29.5 pg      26.1-32.7                 



             785-6)                                              

 

             MCHC (test code = 33.6 g/dL    31.2-35.0                 



             786-4)                                              

 

             RDW-SD (test code = 48.4 fL      38.5-51.6                 



             03951-0)                                            

 

             RDW-CV (test code = 15.1 %       12.1-15.4                 



             788-0)                                              

 

             PLT (test code =              See_Comment                [Automated



             777-3)                                              message] The sy

stem



                                                                 which generated



                                                                 this result



                                                                 transmitted



                                                                 reference range

:



                                                                 150 - 328 10*3/

?L.



                                                                 The reference r

zhen



                                                                 was not used to



                                                                 interpret this



                                                                 result as



                                                                 normal/abnormal

.

 

             MPV (test code = 10.1 fL      9.8-13.0                  



             11492-7)                                            

 

             NRBC/100 WBC (test              See_Comment                [Automat

ed



             code = 1294605301)                                        message] 

The system



                                                                 which generated



                                                                 this result



                                                                 transmitted



                                                                 reference range

:



                                                                 0.0 - 10.0 /100



                                                                 WBCs. The refer

ence



                                                                 range was not u

sed



                                                                 to interpret th

is



                                                                 result as



                                                                 normal/abnormal

.

 

             NRBC x10^3 (test code <0.01        See_Comment                [Auto

mated



             = [id number])                                        message] The s

ystem



                                                                 which generated



                                                                 this result



                                                                 transmitted



                                                                 reference range

:



                                                                 10*3/?L. The



                                                                 reference range

 was



                                                                 not used to



                                                                 interpret this



                                                                 result as



                                                                 normal/abnormal

.

 

             GRAN MAT (NEUT) % 72.7 %                                 



             (test code = 770-8)                                        

 

             IMM GRAN % (test code 0.60 %                                 



             = [id number])                                        

 

             LYMPH % (test code = 15.5 %                                 



             736-9)                                              

 

             MONO % (test code = 8.2 %                                  



             5905-5)                                             

 

             EOS % (test code = 2.6 %                                  



             713-8)                                              

 

             BASO % (test code = 0.4 %                                  



             706-2)                                              

 

             GRAN MAT x10^3(ANC) 7.83 10*3/uL 1.99-6.95    H            



             (test code =                                        



             [id number])                                         

 

             IMM GRAN x10^3 (test 0.07 10*3/uL 0.00-0.06    H            



             code = [id number])                                        

 

             LYMPH x10^3 (test code 1.67 10*3/uL 1.09-3.23                 



             = 731-0)                                            

 

             MONO x10^3 (test code 0.88 10*3/uL 0.36-1.02                 



             = 742-7)                                            

 

             EOS x10^3 (test code = 0.28 10*3/uL 0.06-0.53                 



             711-2)                                              

 

             BASO x10^3 (test code 0.04 10*3/uL 0.01-0.09                 



             = 704-7)                                            

 

             Lab Interpretation Abnormal                               



             (test code = 47853-4)                                        



[id number]:32





             Test Item    Value        Reference Range Interpretation Comments

 

             LACTIC ACID (test code = 1.86 mmol/L  0.50-2.20                 



             [id number])                                         

 

             Lab Interpretation (test code = Normal                             

    



             98038-1)                                            



[facility] OFNCI5980-81-66 12:58:00





             Test Item    Value        Reference Range Interpretation Comments

 

             Glucose Lvl (test code = Glucose Lvl) 228          70-99           

          



[facility]2022-04-12 12:58:00





             Test Item    Value        Reference Range Interpretation Comments

 

             BUN (test code = BUN) 23           7-22                      



[facility]2022-04-12 12:58:00





             Test Item    Value        Reference Range Interpretation Comments

 

             Creatinine Lvl (test code = Creatinine 0.78         0.50-1.40      

           



             Lvl)                                                



[facility]2022-04-12 12:58:00





             Test Item    Value        Reference Range Interpretation Comments

 

             Sodium Lvl (test code = Sodium Lvl) 138          135-145           

        



[facility]2022-04-12 12:58:00





             Test Item    Value        Reference Range Interpretation Comments

 

             Potassium Lvl (test code = Potassium 4.6          3.5-5.1          

         



             Lvl)                                                



[place] 334942-04-12 12:58:00





             Test Item    Value        Reference Range Interpretation Comments

 

             Chloride Lvl (test code = Chloride Lvl) 108                  

            



[facility]2022-04-12 12:58:00





             Test Item    Value        Reference Range Interpretation Comments

 

             CO2 (test code = CO2) 23           24-32                     



[facility]2022-04-12 12:58:00





             Test Item    Value        Reference Range Interpretation Comments

 

             Calcium Lvl (test code = Calcium Lvl) 9.0          8.5-10.5        

          



[facility]2022-04-12 12:58:00





             Test Item    Value        Reference Range Interpretation Comments

 

             AGAP (test code = AGAP) 11.6         10.0-20.0                 



[facility]2022-04-12 12:58:00





             Test Item    Value        Reference Range Interpretation Comments

 

             eGFR (test code = eGFR) 116                                    



[facility]villeJotofudSEOFQXEKAD0104[facility]60-76 12:58:00





             Test Item    Value        Reference Range Interpretation Comments

 

             WBC (test code = WBC) 10.5         3.7-10.4                  



[facility]villeHylgwjsVWSGSDGXPH5127-28-20 12:58:00





             Test Item    Value        Reference Range Interpretation Comments

 

             RBC (test code = RBC) 5.48         4.70-6.10                 



[facility] StephenvilleAojjhkeQFIVASRCSJ4539-42-56 12:58:00





             Test Item    Value        Reference Range Interpretation Comments

 

             Hgb (test code = Hgb) 16.0         14.0-18.0                 



[facility] StephenvilleRgwldsqLUMBOKPBXN5995-69-10 12:58:00





             Test Item    Value        Reference Range Interpretation Comments

 

             Hct (test code = Hct) 49.8         42.0-54.0                 



[facility] StephenvilleFlpftssFNUYBRSQWL2050-39-84 12:58:00





             Test Item    Value        Reference Range Interpretation Comments

 

             MCV (test code = MCV) 90.8         80.0-94.0                 



[place] 236402-04-12 12:58:00





             Test Item    Value        Reference Range Interpretation Comments

 

             MCH (test code = MCH) 29.1 pg      27.0-31.0                 



[facility] StephenvilleAtvnxibYJMQJWHGSV7823-48-82 12:58:00





             Test Item    Value        Reference Range Interpretation Comments

 

             MCHC (test code = MCHC) 32.1         32.0-36.0                 



[place] 236402-04-12 12:58:00





             Test Item    Value        Reference Range Interpretation Comments

 

             RDW (test code = RDW) 15.5         11.5-14.5                 



[facility] StephenvilleDwphdzsYEZFBIIKAK9957-26-27 12:58:00





             Test Item    Value        Reference Range Interpretation Comments

 

             Platelet (test code = Platelet) 312          355-450               

    



[facility] StephenvilleMsnohgoPILBRFKVCL8989-24-97 12:58:00





             Test Item    Value        Reference Range Interpretation Comments

 

             MPV (test code = MPV) 8.3          7.4-10.4                  



[place] 236402-04-12 12:58:00





             Test Item    Value        Reference Range Interpretation Comments

 

             PT (test code = PT) 12.9 s       12.0-14.7                 



[place] 236402-04-12 12:58:00





             Test Item    Value        Reference Range Interpretation Comments

 

             INR (test code = INR) 0.98 1       0.85-1.17                 



[place] [address]-04-12 12:58:00





             Test Item    Value        Reference Range Interpretation Comments

 

             PTT (test code = PTT) 27.3 s       22.9-35.8                 



[place] 236402-04-12 12:58:00





             Test Item    Value        Reference Range Interpretation Comments

 

             Segs (test code = Segs) 71.2         45.0-75.0                 



[place] [address]-04-12 12:58:00





             Test Item    Value        Reference Range Interpretation Comments

 

             Lymphocytes (test code = Lymphocytes) 21.1         20.0-40.0       

          



[place] [address]-04-12 12:58:00





             Test Item    Value        Reference Range Interpretation Comments

 

             Monocytes (test code = Monocytes) 6.8          2.0-12.0            

      



[place] 236402-04-12 12:58:00





             Test Item    Value        Reference Range Interpretation Comments

 

             Eosinophils (test code = 0.1          See_Comment                [A

utomated message] The



             Eosinophils)                                        system which ge

nerated



                                                                 this result tra

nsmitted



                                                                 reference range

: <=4.0.



                                                                 The reference r

zhen was



                                                                 not used to int

erpret



                                                                 this result as



                                                                 normal/abnormal

.



[facility] StephenvilleKqiasigBROVFEQOEA2289-45-57 12:58:00





             Test Item    Value        Reference Range Interpretation Comments

 

             Basophils (test code = 0.8          See_Comment                [Aut

omated message] The



             Basophils)                                          system which ge

nerated



                                                                 this result tra

nsmitted



                                                                 reference range

: <=1.0.



                                                                 The reference r

zhen was



                                                                 not used to int

erpret



                                                                 this result as



                                                                 normal/abnormal

.



[place] 236402-04-12 12:58:00





             Test Item    Value        Reference Range Interpretation Comments

 

             Neutrophils # (test code = Neutrophils 7.5          1.5-8.1        

           



             #)                                                  



[place] 236402-04-12 12:58:00





             Test Item    Value        Reference Range Interpretation Comments

 

             Lymphocytes # (test code = Lymphocytes 2.2          1.0-5.5        

           



             #)                                                  



[place] 236402-04-12 12:58:00





             Test Item    Value        Reference Range Interpretation Comments

 

             Monocytes # (test code 0.7          See_Comment                [Aut

omated message] The



             = Monocytes #)                                        system which 

generated



                                                                 this result tra

nsmitted



                                                                 reference range

: <=0.8.



                                                                 The reference r

zhen was



                                                                 not used to int

erpret



                                                                 this result as



                                                                 normal/abnormal

.



[facility] StephenvilleOzvnuhkRWYVSNTMCN7325-16-66 12:58:00





             Test Item    Value        Reference Range Interpretation Comments

 

             Basophils # (test code 0.1          See_Comment                [Aut

omated message] The



             = Basophils #)                                        system which 

generated



                                                                 this result tra

nsmitted



                                                                 reference range

: <=0.2.



                                                                 The reference r

zhen was



                                                                 not used to int

erpret



                                                                 this result as



                                                                 normal/abnormal

.



[facility] NARCE5943[facility] 12:58:00





             Test Item    Value        Reference Range Interpretation Comments

 

             Glucose Lvl (test code = Glucose Lvl) 228          70-99           

          



[place] 334942-04-12 12:58:00





             Test Item    Value        Reference Range Interpretation Comments

 

             BUN (test code = BUN) 23           7-22                      



[facility]2022-04-12 12:58:00





             Test Item    Value        Reference Range Interpretation Comments

 

             Creatinine Lvl (test code = Creatinine 0.78         0.50-1.40      

           



             Lvl)                                                



[facility]2022-04-12 12:58:00





             Test Item    Value        Reference Range Interpretation Comments

 

             Sodium Lvl (test code = Sodium Lvl) 138          135-145           

        



[facility]2022-04-12 12:58:00





             Test Item    Value        Reference Range Interpretation Comments

 

             Potassium Lvl (test code = Potassium 4.6          3.5-5.1          

         



             Lvl)                                                



[facility]2022-04-12 12:58:00





             Test Item    Value        Reference Range Interpretation Comments

 

             Chloride Lvl (test code = Chloride Lvl) 108                  

            



[facility] KTQES8722-11-54 12:58:00





             Test Item    Value        Reference Range Interpretation Comments

 

             CO2 (test code = CO2) 23           24-32                     



[facility] BWDJD1398-78-60 12:58:00





             Test Item    Value        Reference Range Interpretation Comments

 

             Calcium Lvl (test code = Calcium Lvl) 9.0          8.5-10.5        

          



[facility] LOLMK4094-39-13 12:58:00





             Test Item    Value        Reference Range Interpretation Comments

 

             AGAP (test code = AGAP) 11.6         10.0-20.0                 



[facility]2022-04-12 12:58:00





             Test Item    Value        Reference Range Interpretation Comments

 

             eGFR (test code = eGFR) 116                                    



[facility]villeKgsyovwIEEYPTJHXV4267-37-00 12:58:00





             Test Item    Value        Reference Range Interpretation Comments

 

             WBC (test code = WBC) 10.5         3.7-10.4                  



[facility] StephenvilleYgpmusbIROANPKGWH9585-25-31 12:58:00





             Test Item    Value        Reference Range Interpretation Comments

 

             RBC (test code = RBC) 5.48         4.70-6.10                 



[facility] StephenvilleUtrznzcWHKZEKWTMI7366-99-24 12:58:00





             Test Item    Value        Reference Range Interpretation Comments

 

             Hgb (test code = Hgb) 16.0         14.0-18.0                 



[place] 236402-04-12 12:58:00





             Test Item    Value        Reference Range Interpretation Comments

 

             Hct (test code = Hct) 49.8         42.0-54.0                 



[facility] StephenvilleXyxavjlIEVRDFGPEI4203-15-60 12:58:00





             Test Item    Value        Reference Range Interpretation Comments

 

             MCV (test code = MCV) 90.8         80.0-94.0                 



[facility] StephenvilleUtydqbvMPNZKZMBDP3749-14-93 12:58:00





             Test Item    Value        Reference Range Interpretation Comments

 

             MCH (test code = MCH) 29.1 pg      27.0-31.0                 



[facility] StephenvilleBcoueljHFAHLNBAMC7982-37-32 12:58:00





             Test Item    Value        Reference Range Interpretation Comments

 

             MCHC (test code = MCHC) 32.1         32.0-36.0                 



[facility] StephenvilleCwjqnszSDQMDYVZSE2820-30-84 12:58:00





             Test Item    Value        Reference Range Interpretation Comments

 

             RDW (test code = RDW) 15.5         11.5-14.5                 



[facility] StephenvilleJzinrdsVNJPIXQDFK0056-17-34 12:58:00





             Test Item    Value        Reference Range Interpretation Comments

 

             Platelet (test code = Platelet) 312          133-450               

    



[facility] StephenvilleToofvbnAXFAAMWQVO9564-41-89 12:58:00





             Test Item    Value        Reference Range Interpretation Comments

 

             MPV (test code = MPV) 8.3          7.4-10.4                  



[place] 236402-04-12 12:58:00





             Test Item    Value        Reference Range Interpretation Comments

 

             PT (test code = PT) 12.9 s       12.0-14.7                 



[facility] StephenvilleHyigoxyBZXEQFDIUC4391-74-30 12:58:00





             Test Item    Value        Reference Range Interpretation Comments

 

             INR (test code = INR) 0.98 1       0.85-1.17                 



[place] [address]-04-12 12:58:00





             Test Item    Value        Reference Range Interpretation Comments

 

             PTT (test code = PTT) 27.3 s       22.9-35.8                 



[place] [address]-04-12 12:58:00





             Test Item    Value        Reference Range Interpretation Comments

 

             Segs (test code = Segs) 71.2         45.0-75.0                 



[place] [address]-04-12 12:58:00





             Test Item    Value        Reference Range Interpretation Comments

 

             Lymphocytes (test code = Lymphocytes) 21.1         20.0-40.0       

          



[place] [address]-04-12 12:58:00





             Test Item    Value        Reference Range Interpretation Comments

 

             Monocytes (test code = Monocytes) 6.8          2.0-12.0            

      



[place] [address]-04-12 12:58:00





             Test Item    Value        Reference Range Interpretation Comments

 

             Eosinophils (test code = 0.1          See_Comment                [A

utomated message] The



             Eosinophils)                                        system which ge

nerated



                                                                 this result tra

nsmitted



                                                                 reference range

: <=4.0.



                                                                 The reference r

zhen was



                                                                 not used to int

erpret



                                                                 this result as



                                                                 normal/abnormal

.



[place] [address]-04-12 12:58:00





             Test Item    Value        Reference Range Interpretation Comments

 

             Basophils (test code = 0.8          See_Comment                [Aut

omated message] The



             Basophils)                                          system which ge

nerated



                                                                 this result tra

nsmitted



                                                                 reference range

: <=1.0.



                                                                 The reference r

zhen was



                                                                 not used to int

erpret



                                                                 this result as



                                                                 normal/abnormal

.



[place] 236402-04-12 12:58:00





             Test Item    Value        Reference Range Interpretation Comments

 

             Neutrophils # (test code = Neutrophils 7.5          1.5-8.1        

           



             #)                                                  



[place] 236402-04-12 12:58:00





             Test Item    Value        Reference Range Interpretation Comments

 

             Lymphocytes # (test code = Lymphocytes 2.2          1.0-5.5        

           



             #)                                                  



[place] [address]-04-12 12:58:00





             Test Item    Value        Reference Range Interpretation Comments

 

             Monocytes # (test code 0.7          See_Comment                [Aut

omated message] The



             = Monocytes #)                                        system which 

generated



                                                                 this result tra

nsmitted



                                                                 reference range

: <=0.8.



                                                                 The reference r

zhen was



                                                                 not used to int

erpret



                                                                 this result as



                                                                 normal/abnormal

.



[id number] 12:58:00





             Test Item    Value        Reference Range Interpretation Comments

 

             Basophils # (test code 0.1          See_Comment                [Aut

omated message] The



             = Basophils #)                                        system which 

generated



                                                                 this result tra

nsmitted



                                                                 reference range

: <=0.2.



                                                                 The reference r

zhen was



                                                                 not used to int

erpret



                                                                 this result as



                                                                 normal/abnormal

.



[facility]2022-04-12 12:58:00





             Test Item    Value        Reference Range Interpretation Comments

 

             Glucose Lvl (test code = Glucose Lvl) 228          70-99           

          



[place] [id number] 12:58:00





             Test Item    Value        Reference Range Interpretation Comments

 

             BUN (test code = BUN) 23           7-22                      



[place] 334942-04-12 12:58:00





             Test Item    Value        Reference Range Interpretation Comments

 

             Creatinine Lvl (test code = Creatinine 0.78         0.50-1.40      

           



             Lvl)                                                



[facility]2022-04-12 12:58:00





             Test Item    Value        Reference Range Interpretation Comments

 

             Sodium Lvl (test code = Sodium Lvl) 138          135-145           

        



[place] 334942-04-12 12:58:00





             Test Item    Value        Reference Range Interpretation Comments

 

             Potassium Lvl (test code = Potassium 4.6          3.5-5.1          

         



             Lvl)                                                



[facility]2022-04-12 12:58:00





             Test Item    Value        Reference Range Interpretation Comments

 

             Chloride Lvl (test code = Chloride Lvl) 108                  

            



[place] 334942-04-12 12:58:00





             Test Item    Value        Reference Range Interpretation Comments

 

             CO2 (test code = CO2) 23           24-32                     



[place] 334942-04-12 12:58:00





             Test Item    Value        Reference Range Interpretation Comments

 

             Calcium Lvl (test code = Calcium Lvl) 9.0          8.5-10.5        

          



[place] 334942-04-12 12:58:00





             Test Item    Value        Reference Range Interpretation Comments

 

             AGAP (test code = AGAP) 11.6         10.0-20.0                 



[place] 334942-04-12 12:58:00





             Test Item    Value        Reference Range Interpretation Comments

 

             eGFR (test code = eGFR) 116                                    



[place] 236402-04-12 12:58:00





             Test Item    Value        Reference Range Interpretation Comments

 

             WBC (test code = WBC) 10.5         3.7-10.4                  



[facility] StephenvilleNikireuUQNREPLZXP0113-83-83 12:58:00





             Test Item    Value        Reference Range Interpretation Comments

 

             RBC (test code = RBC) 5.48         4.70-6.10                 



[place] 236402-04-12 12:58:00





             Test Item    Value        Reference Range Interpretation Comments

 

             Hgb (test code = Hgb) 16.0         14.0-18.0                 



[place] [address]-04-12 12:58:00





             Test Item    Value        Reference Range Interpretation Comments

 

             Hct (test code = Hct) 49.8         42.0-54.0                 



[place] [address]-04-12 12:58:00





             Test Item    Value        Reference Range Interpretation Comments

 

             MCV (test code = MCV) 90.8         80.0-94.0                 



[place] [address]-04-12 12:58:00





             Test Item    Value        Reference Range Interpretation Comments

 

             MCH (test code = MCH) 29.1 pg      27.0-31.0                 



[place] [address]-04-12 12:58:00





             Test Item    Value        Reference Range Interpretation Comments

 

             MCHC (test code = MCHC) 32.1         32.0-36.0                 



[place] [address]-04-12 12:58:00





             Test Item    Value        Reference Range Interpretation Comments

 

             RDW (test code = RDW) 15.5         11.5-14.5                 



[place] [address]-04-12 12:58:00





             Test Item    Value        Reference Range Interpretation Comments

 

             Platelet (test code = Platelet) 312          133-450               

    



[facility] StephenvilleKktvacfKFBQOQUJDS1999-44-16 12:58:00





             Test Item    Value        Reference Range Interpretation Comments

 

             MPV (test code = MPV) 8.3          7.4-10.4                  



[place] [address]-04-12 12:58:00





             Test Item    Value        Reference Range Interpretation Comments

 

             PT (test code = PT) 12.9 s       12.0-14.7                 



[place] [address]-04-12 12:58:00





             Test Item    Value        Reference Range Interpretation Comments

 

             INR (test code = INR) 0.98 1       0.85-1.17                 



[place] 236402-04-12 12:58:00





             Test Item    Value        Reference Range Interpretation Comments

 

             PTT (test code = PTT) 27.3 s       22.9-35.8                 



[place] [address]-04-12 12:58:00





             Test Item    Value        Reference Range Interpretation Comments

 

             Segs (test code = Segs) 71.2         45.0-75.0                 



[place] [address]-04-12 12:58:00





             Test Item    Value        Reference Range Interpretation Comments

 

             Lymphocytes (test code = Lymphocytes) 21.1         20.0-40.0       

          



[place] [address]-04-12 12:58:00





             Test Item    Value        Reference Range Interpretation Comments

 

             Monocytes (test code = Monocytes) 6.8          2.0-12.0            

      



[place] [address]-04-12 12:58:00





             Test Item    Value        Reference Range Interpretation Comments

 

             Eosinophils (test code = 0.1          See_Comment                [A

utomated message] The



             Eosinophils)                                        system which ge

nerated



                                                                 this result tra

nsmitted



                                                                 reference range

: <=4.0.



                                                                 The reference r

zhen was



                                                                 not used to int

erpret



                                                                 this result as



                                                                 normal/abnormal

.



[place] [address]-04-12 12:58:00





             Test Item    Value        Reference Range Interpretation Comments

 

             Basophils (test code = 0.8          See_Comment                [Aut

omated message] The



             Basophils)                                          system which ge

nerated



                                                                 this result tra

nsmitted



                                                                 reference range

: <=1.0.



                                                                 The reference r

zhen was



                                                                 not used to int

erpret



                                                                 this result as



                                                                 normal/abnormal

.



[place] [address]-04-12 12:58:00





             Test Item    Value        Reference Range Interpretation Comments

 

             Neutrophils # (test code = Neutrophils 7.5          1.5-8.1        

           



             #)                                                  



[place] [address]-04-12 12:58:00





             Test Item    Value        Reference Range Interpretation Comments

 

             Lymphocytes # (test code = Lymphocytes 2.2          1.0-5.5        

           



             #)                                                  



[place] [address]-04-12 12:58:00





             Test Item    Value        Reference Range Interpretation Comments

 

             Monocytes # (test code 0.7          See_Comment                [Aut

omated message] The



             = Monocytes #)                                        system which 

generated



                                                                 this result tra

nsmitted



                                                                 reference range

: <=0.8.



                                                                 The reference r

zhen was



                                                                 not used to int

erpret



                                                                 this result as



                                                                 normal/abnormal

.



[place] [address]-04-12 12:58:00





             Test Item    Value        Reference Range Interpretation Comments

 

             Basophils # (test code 0.1          See_Comment                [Aut

omated message] The



             = Basophils #)                                        system which 

generated



                                                                 this result tra

nsmitted



                                                                 reference range

: <=0.2.



                                                                 The reference r

zhen was



                                                                 not used to int

erpret



                                                                 this result as



                                                                 normal/abnormal

.



[facility]2022-04-12 11:59:00





             Test Item    Value        Reference Range Interpretation Comments

 

             Tube Num CSF (test code = Tube Num CSF) 3 1                        

            



[facility]2022-04-12 11:59:00





             Test Item    Value        Reference Range Interpretation Comments

 

             Color CSF (test code Colorless (22 6:59                       

    



             = Color CSF) AM)                                    



[facility] HospitalCanwestJHMGIU9163[facility] 11:59:00





             Test Item    Value        Reference Range Interpretation Comments

 

             Clarity CSF (test code = Clear (22 6:59                       

    



             Clarity CSF) AM)                                    



[facility]2022-04-12 11:59:00





             Test Item    Value        Reference Range Interpretation Comments

 

             Supernat CSF (test Colorless (22 6:59                         

  



             code = Supernat CSF) AM)                                    



[facility]2022-04-12 11:59:00





             Test Item    Value        Reference Range Interpretation Comments

 

             Nucleated Cells CSF 3            See_Comment                [Automa

huma message] The



             (test code = Nucleated                                        syste

m which generated



             Cells CSF)                                          this result tra

nsmitted



                                                                 reference range

: <=53.



                                                                 The reference r

zhen was



                                                                 not used to int

erpret



                                                                 this result as



                                                                 normal/abnormal

.



[facility]2022-04-12 11:59:00





             Test Item    Value        Reference Range Interpretation Comments

 

             RBC CSF (test code = 64           See_Comment                [Autom

ated message] The



             RBC CSF)                                            system which ge

nerated this



                                                                 result transmit

huma



                                                                 reference range

: <=03. The



                                                                 reference range

 was not



                                                                 used to interpr

et this



                                                                 result as zayda

l/abnormal.



[facility]2022-04-12 11:59:00





             Test Item    Value        Reference Range Interpretation Comments

 

             Comment CSF (test Differential not performed                       

    



             code = Comment CSF) on WBC count of less than                      

     



                          5. Cell counts performed                           



                          on CSF greater than two                           



                          hours after collection may                           



                          not be representative due                           



                          to cellular degradation.                           



[facility]2022-04-12 11:59:00





             Test Item    Value        Reference Range Interpretation Comments

 

             Glucose CSF (test code = Glucose CSF) 129          45-80           

          



[facility] [id number] 11:59:00





             Test Item    Value        Reference Range Interpretation Comments

 

             Protein CSF (test code = Protein CSF) 110          15-45           

          



[facility] Stain [id number] 11:59:00





             Test Item    Value        Reference Range Interpretation Comments

 

             Gram Stain Report Gram Stain Performed By:                         

  



             (test code = Gram [facility]                           



             St[facility] Report) [facility] [id number] 11:59:00





             Test Item    Value        Reference Range Interpretation Comments

 

             Culture: CSF w/Gram 48 Hour Report - No                           



             Stain (test code = Growth, Holding                           



             Culture: CSF w/Gram                                        



             Stain)                                              



[facility]2022-04-12 11:59:00





             Test Item    Value        Reference Range Interpretation Comments

 

             Tube Num CSF (test code = Tube Num CSF) 3 1                        

            



[facility]2022-04-12 11:59:00





             Test Item    Value        Reference Range Interpretation Comments

 

             Color CSF (test code Colorless (22 6:59                       

    



             = Color CSF) AM)                                    



[facility]2022-04-12 11:59:00





             Test Item    Value        Reference Range Interpretation Comments

 

             Clarity CSF (test code = Clear (22 6:59                       

    



             Clarity CSF) AM)                                    



[facility]2022-04-12 11:59:00





             Test Item    Value        Reference Range Interpretation Comments

 

             Supernat CSF (test Colorless (22 6:59                         

  



             code = Supernat CSF) AM)                                    



[facility]2022-04-12 11:59:00





             Test Item    Value        Reference Range Interpretation Comments

 

             Nucleated Cells CSF 3            See_Comment                [Automa

huma message] The



             (test code = Nucleated                                        syste

m which generated



             Cells CSF)                                          this result tra

nsmitted



                                                                 reference range

: <=53.



                                                                 The reference r

zhen was



                                                                 not used to int

erpret



                                                                 this result as



                                                                 normal/abnormal

.



[facility]2022-04-12 11:59:00





             Test Item    Value        Reference Range Interpretation Comments

 

             RBC CSF (test code = 64           See_Comment                [Autom

ated message] The



             RBC CSF)                                            system which ge

nerated this



                                                                 result transmit

huma



                                                                 reference range

: <=03. The



                                                                 reference range

 was not



                                                                 used to interpr

et this



                                                                 result as zayda

l/abnormal.



[facility]2022-04-12 11:59:00





             Test Item    Value        Reference Range Interpretation Comments

 

             Comment CSF (test Differential not performed                       

    



             code = Comment CSF) on WBC count of less than                      

     



                          5. Cell counts performed                           



                          on CSF greater than two                           



                          hours after collection may                           



                          not be representative due                           



                          to cellular degradation.                           



[facility]2022-04-12 11:59:00





             Test Item    Value        Reference Range Interpretation Comments

 

             Glucose CSF (test code = Glucose CSF) 129          45-80           

          



[facility] [id number] 11:59:00





             Test Item    Value        Reference Range Interpretation Comments

 

             Protein CSF (test code = Protein CSF) 110          15-45           

          



[facility] [id number] 11:59:00





             Test Item    Value        Reference Range Interpretation Comments

 

             Gram Stain Report Gram Stain Performed By:                         

  



             (test code = Gram [facility]                           



             Stain Report) [facility] w/Gram [id number] 11:59:00





             Test Item    Value        Reference Range Interpretation Comments

 

             Culture: CSF w/Gram 48 Hour Report - No                           



             Stain (test code = Growth, Holding                           



             Culture: CSF w/Gram                                        



             Stain)                                              



[facility]2022-04-12 11:59:00





             Test Item    Value        Reference Range Interpretation Comments

 

             Tube Num CSF (test code = Tube Num CSF) 3 1                        

            



[facility]2022-04-12 11:59:00





             Test Item    Value        Reference Range Interpretation Comments

 

             Color CSF (test code Colorless (22 6:59                       

    



             = Color CSF) AM)                                    



[facility]2022-04-12 11:59:00





             Test Item    Value        Reference Range Interpretation Comments

 

             Clarity CSF (test code = Clear (22 6:59                       

    



             Clarity CSF) AM)                                    



[facility]2022-04-12 11:59:00





             Test Item    Value        Reference Range Interpretation Comments

 

             Supernat CSF (test Colorless (22 6:59                         

  



             code = Supernat CSF) AM)                                    



[facility]2022-04-12 11:59:00





             Test Item    Value        Reference Range Interpretation Comments

 

             Nucleated Cells CSF 3            See_Comment                [Automa

huma message] The



             (test code = Nucleated                                        syste

m which generated



             Cells CSF)                                          this result tra

nsmitted



                                                                 reference range

: <=53.



                                                                 The reference r

zhen was



                                                                 not used to int

erpret



                                                                 this result as



                                                                 normal/abnormal

.



[facility]2022-04-12 11:59:00





             Test Item    Value        Reference Range Interpretation Comments

 

             RBC CSF (test code = 64           See_Comment                [Autom

ated message] The



             RBC CSF)                                            system which ge

nerated this



                                                                 result transmit

huma



                                                                 reference range

: <=03. The



                                                                 reference range

 was not



                                                                 used to interpr

et this



                                                                 result as zayda

l/abnormal.



[facility]2022-04-12 11:59:00





             Test Item    Value        Reference Range Interpretation Comments

 

             Comment CSF (test Differential not performed                       

    



             code = Comment CSF) on WBC count of less than                      

     



                          5. Cell counts performed                           



                          on CSF greater than two                           



                          hours after collection may                           



                          not be representative due                           



                          to cellular degradation.                           



[facility] CenterSimpleDe[facility] [id number] 11:59:00





             Test Item    Value        Reference Range Interpretation Comments

 

             Glucose CSF (test code = Glucose CSF) 129          45-80           

          



[facility] CenterB[facility] [id number] 11:59:00





             Test Item    Value        Reference Range Interpretation Comments

 

             Protein CSF (test code = Protein CSF) 110          15-45           

          



[facility] HospitalannGram Stain [id number] 11:59:00





             Test Item    Value        Reference Range Interpretation Comments

 

             Gram Stain Report Gram Stain Performed By:                         

  



             (test code = Gram [facility]                           



             St[facility] [id number] 11:59:00





             Test Item    Value        Reference Range Interpretation Comments

 

             Culture: CSF w/Gram 48 Hour Report - No                           



             Stain (test code = Growth, Holding                           



             Culture: CSF w/Gram                                        



             Stain)                                              



[facility] [id number] 14:46:00





             Test Item    Value        Reference Range Interpretation Comments

 

             Hgb A1C (test code = Hgb A1C) 5.6                                  

  



[facility] [id number] 14:46:00





             Test Item    Value        Reference Range Interpretation Comments

 

             Hgb A1C (test code = Hgb A1C) 5.6                                  

  



[facility] [id number] 14:46:00





             Test Item    Value        Reference Range Interpretation Comments

 

             Hgb A1C (test code = Hgb A1C) 5.6                                  

  



[facility]ativeCHE[facility] [id number] 11:43:00





             Test Item    Value        Reference Range Interpretation Comments

 

             Glucose Lvl (test code = Glucose Lvl) 418          70-99           

          



[facility] [id number] 11:43:00





             Test Item    Value        Reference Range Interpretation Comments

 

             BUN (test code = BUN) 22           7-22                      



[facility] [id number] 11:43:00





             Test Item    Value        Reference Range Interpretation Comments

 

             Creatinine Lvl (test code = Creatinine 1.10         0.50-1.40      

           



             Lvl)                                                



[facility] [employer] [id number] 11:43:00





             Test Item    Value        Reference Range Interpretation Comments

 

             Sodium Lvl (test code = Sodium Lvl) 138          135-145           

        



[place] 334942-04-11 11:43:00





             Test Item    Value        Reference Range Interpretation Comments

 

             Potassium Lvl (test code = Potassium 4.9          3.5-5.1          

         



             Lvl)                                                



[facility]2022-04-11 11:43:00





             Test Item    Value        Reference Range Interpretation Comments

 

             Chloride Lvl (test code = Chloride Lvl) 113                  

            



[facility]2022-04-11 11:43:00





             Test Item    Value        Reference Range Interpretation Comments

 

             CO2 (test code = CO2) 16           24-32                     



[place] 334942-04-11 11:43:00





             Test Item    Value        Reference Range Interpretation Comments

 

             AGAP (test code = AGAP) 13.9         10.0-20.0                 



[place] 334942-04-11 11:43:00





             Test Item    Value        Reference Range Interpretation Comments

 

             Calcium Lvl (test code = Calcium Lvl) 9.0          8.5-10.5        

          



[facility]2022-04-11 11:43:00





             Test Item    Value        Reference Range Interpretation Comments

 

             eGFR (test code = eGFR) 86                                     



[place] 236402-04-11 11:43:00





             Test Item    Value        Reference Range Interpretation Comments

 

             WBC (test code = WBC) 10.2         3.7-10.4                  



[place] 236402-04-11 11:43:00





             Test Item    Value        Reference Range Interpretation Comments

 

             RBC (test code = RBC) 5.46         4.70-6.10                 



[place] 236402-04-11 11:43:00





             Test Item    Value        Reference Range Interpretation Comments

 

             Hgb (test code = Hgb) 16.3         14.0-18.0                 



[place] 236402-04-11 11:43:00





             Test Item    Value        Reference Range Interpretation Comments

 

             Hct (test code = Hct) 50.0         42.0-54.0                 



[place] [address]-04-11 11:43:00





             Test Item    Value        Reference Range Interpretation Comments

 

             MCV (test code = MCV) 91.4         80.0-94.0                 



[place] 236402-04-11 11:43:00





             Test Item    Value        Reference Range Interpretation Comments

 

             MCH (test code = MCH) 29.9 pg      27.0-31.0                 



[place] 236402-04-11 11:43:00





             Test Item    Value        Reference Range Interpretation Comments

 

             MCHC (test code = MCHC) 32.7         32.0-36.0                 



[facility]villeWttnieaGVATPASOYY1725-80-19 11:43:00





             Test Item    Value        Reference Range Interpretation Comments

 

             RDW (test code = RDW) 15.7         11.5-14.5                 



[facility] StephenvilleHsqbbmlMNFAWXUYJQ0124-32-82 11:43:00





             Test Item    Value        Reference Range Interpretation Comments

 

             Platelet (test code = Platelet) 321          133-450               

    



[facility] StephenvilleXsoiyyqXRPAGNNNJA9041-08-79 11:43:00





             Test Item    Value        Reference Range Interpretation Comments

 

             MPV (test code = MPV) 8.6          7.4-10.4                  



[facility] StephenvilleWuocbxsBDATYCURVW2896-17-11 11:43:00





             Test Item    Value        Reference Range Interpretation Comments

 

             Segs (test code = Segs) 92.0         45.0-75.0                 



[facility] StephenvilleRgflhomPAIRMAZAZG1712-90-15 11:43:00





             Test Item    Value        Reference Range Interpretation Comments

 

             Lymphocytes (test code = Lymphocytes) 5.2          20.0-40.0       

          



[place] 236402-04-11 11:43:00





             Test Item    Value        Reference Range Interpretation Comments

 

             Monocytes (test code = Monocytes) 1.6          2.0-12.0            

      



[facility] StephenvilleEivykzlIEPTBHORQX7330-17-16 11:43:00





             Test Item    Value        Reference Range Interpretation Comments

 

             Basophils (test code = 1.2          See_Comment                [Aut

omated message] The



             Basophils)                                          system which ge

nerated



                                                                 this result tra

nsmitted



                                                                 reference range

: <=1.0.



                                                                 The reference r

zhen was



                                                                 not used to int

erpret



                                                                 this result as



                                                                 normal/abnormal

.



[facility] StephenvilleUzagdnnTTLPLHIXJH1336-81-39 11:43:00





             Test Item    Value        Reference Range Interpretation Comments

 

             Neutrophils # (test code = Neutrophils 9.4          1.5-8.1        

           



             #)                                                  



[facility] StephenvilleEzsyovwNXRYJEMCVQ1022-92-95 11:43:00





             Test Item    Value        Reference Range Interpretation Comments

 

             Lymphocytes # (test code = Lymphocytes 0.5          1.0-5.5        

           



             #)                                                  



[place] 236402-04-11 11:43:00





             Test Item    Value        Reference Range Interpretation Comments

 

             Monocytes # (test code 0.2          See_Comment                [Aut

omated message] The



             = Monocytes #)                                        system which 

generated



                                                                 this result tra

nsmitted



                                                                 reference range

: <=0.8.



                                                                 The reference r

zhen was



                                                                 not used to int

erpret



                                                                 this result as



                                                                 normal/abnormal

.



[place] 236402-04-11 11:43:00





             Test Item    Value        Reference Range Interpretation Comments

 

             Basophils # (test code 0.1          See_Comment                [Aut

omated message] The



             = Basophils #)                                        system which 

generated



                                                                 this result tra

nsmitted



                                                                 reference range

: <=0.2.



                                                                 The reference r

zhen was



                                                                 not used to int

erpret



                                                                 this result as



                                                                 normal/abnormal

.



[place] 334942-04-11 11:43:00





             Test Item    Value        Reference Range Interpretation Comments

 

             Glucose Lvl (test code = Glucose Lvl) 418          70-99           

          



[place] 334942-04-11 11:43:00





             Test Item    Value        Reference Range Interpretation Comments

 

             BUN (test code = BUN) 22           7-22                      



[place] 334942-04-11 11:43:00





             Test Item    Value        Reference Range Interpretation Comments

 

             Creatinine Lvl (test code = Creatinine 1.10         0.50-1.40      

           



             Lvl)                                                



[facility]2022-04-11 11:43:00





             Test Item    Value        Reference Range Interpretation Comments

 

             Sodium Lvl (test code = Sodium Lvl) 138          135-145           

        



[place] 334942-04-11 11:43:00





             Test Item    Value        Reference Range Interpretation Comments

 

             Potassium Lvl (test code = Potassium 4.9          3.5-5.1          

         



             Lvl)                                                



[facility]2022-04-11 11:43:00





             Test Item    Value        Reference Range Interpretation Comments

 

             Chloride Lvl (test code = Chloride Lvl) 113                  

            



[facility]2022-04-11 11:43:00





             Test Item    Value        Reference Range Interpretation Comments

 

             CO2 (test code = CO2) 16           24-32                     



[facility]2022-04-11 11:43:00





             Test Item    Value        Reference Range Interpretation Comments

 

             AGAP (test code = AGAP) 13.9         10.0-20.0                 



[place] 334942-04-11 11:43:00





             Test Item    Value        Reference Range Interpretation Comments

 

             Calcium Lvl (test code = Calcium Lvl) 9.0          8.5-10.5        

          



[place] 334942-04-11 11:43:00





             Test Item    Value        Reference Range Interpretation Comments

 

             eGFR (test code = eGFR) 86                                     



[place] 236402-04-11 11:43:00





             Test Item    Value        Reference Range Interpretation Comments

 

             WBC (test code = WBC) 10.2         3.7-10.4                  



[facility] StephenvilleUvatnskVOZKJVOTRQ5683-51-86 11:43:00





             Test Item    Value        Reference Range Interpretation Comments

 

             RBC (test code = RBC) 5.46         4.70-6.10                 



[facility] StephenvilleSpwwdnsOQWPEYHLCM1384-16-35 11:43:00





             Test Item    Value        Reference Range Interpretation Comments

 

             Hgb (test code = Hgb) 16.3         14.0-18.0                 



[facility] StephenvilleKnmoutvZITDDJTSKW7314-89-86 11:43:00





             Test Item    Value        Reference Range Interpretation Comments

 

             Hct (test code = Hct) 50.0         42.0-54.0                 



[facility] StephenvilleRveaokxXSRSHXGUUY2457-66-33 11:43:00





             Test Item    Value        Reference Range Interpretation Comments

 

             MCV (test code = MCV) 91.4         80.0-94.0                 



[facility] StephenvilleJwvyvuqMDGNZKXAKY0436-12-19 11:43:00





             Test Item    Value        Reference Range Interpretation Comments

 

             MCH (test code = MCH) 29.9 pg      27.0-31.0                 



[facility] StephenvilleHcbjgahGVUAYLOUZZ6752-78-34 11:43:00





             Test Item    Value        Reference Range Interpretation Comments

 

             MCHC (test code = MCHC) 32.7         32.0-36.0                 



[facility] StephenvilleUkmfclrEYWWYGVOHI2939-44-26 11:43:00





             Test Item    Value        Reference Range Interpretation Comments

 

             RDW (test code = RDW) 15.7         11.5-14.5                 



[facility] StephenvilleOnikclxJBOHLSBGRO1928-36-96 11:43:00





             Test Item    Value        Reference Range Interpretation Comments

 

             Platelet (test code = Platelet) 321          133-450               

    



[facility] StephenvilleKbvlkfjYYZSAQIWBD3128-89-41 11:43:00





             Test Item    Value        Reference Range Interpretation Comments

 

             MPV (test code = MPV) 8.6          7.4-10.4                  



[facility] StephenvilleInpssgvOVTVBOMTKA8544-78-70 11:43:00





             Test Item    Value        Reference Range Interpretation Comments

 

             Segs (test code = Segs) 92.0         45.0-75.0                 



[facility] StephenvilleEwzouljJMXCOMWOZE5979-32-38 11:43:00





             Test Item    Value        Reference Range Interpretation Comments

 

             Lymphocytes (test code = Lymphocytes) 5.2          20.0-40.0       

          



[facility] StephenvilleZpeojhfJIKUTAROQW8304-00-64 11:43:00





             Test Item    Value        Reference Range Interpretation Comments

 

             Monocytes (test code = Monocytes) 1.6          2.0-12.0            

      



[place] [address]-04-11 11:43:00





             Test Item    Value        Reference Range Interpretation Comments

 

             Basophils (test code = 1.2          See_Comment                [Aut

omated message] The



             Basophils)                                          system which ge

nerated



                                                                 this result tra

nsmitted



                                                                 reference range

: <=1.0.



                                                                 The reference r

zhen was



                                                                 not used to int

erpret



                                                                 this result as



                                                                 normal/abnormal

.



[place] 236402-04-11 11:43:00





             Test Item    Value        Reference Range Interpretation Comments

 

             Neutrophils # (test code = Neutrophils 9.4          1.5-8.1        

           



             #)                                                  



[facility] StephenvilleYcrocmuWEJXTCULXB4482-89-38 11:43:00





             Test Item    Value        Reference Range Interpretation Comments

 

             Lymphocytes # (test code = Lymphocytes 0.5          1.0-5.5        

           



             #)                                                  



[place] 236402-04-11 11:43:00





             Test Item    Value        Reference Range Interpretation Comments

 

             Monocytes # (test code 0.2          See_Comment                [Aut

omated message] The



             = Monocytes #)                                        system which 

generated



                                                                 this result tra

nsmitted



                                                                 reference range

: <=0.8.



                                                                 The reference r

zhen was



                                                                 not used to int

erpret



                                                                 this result as



                                                                 normal/abnormal

.



[place] 236402-04-11 11:43:00





             Test Item    Value        Reference Range Interpretation Comments

 

             Basophils # (test code 0.1          See_Comment                [Aut

omated message] The



             = Basophils #)                                        system which 

generated



                                                                 this result tra

nsmitted



                                                                 reference range

: <=0.2.



                                                                 The reference r

zhen was



                                                                 not used to int

erpret



                                                                 this result as



                                                                 normal/abnormal

.



[facility]2022-04-11 11:43:00





             Test Item    Value        Reference Range Interpretation Comments

 

             Glucose Lvl (test code = Glucose Lvl) 418          70-99           

          



[place] 334942-04-11 11:43:00





             Test Item    Value        Reference Range Interpretation Comments

 

             BUN (test code = BUN) 22           7-22                      



[place] 334942-04-11 11:43:00





             Test Item    Value        Reference Range Interpretation Comments

 

             Creatinine Lvl (test code = Creatinine 1.10         0.50-1.40      

           



             Lvl)                                                



[place] 334942-04-11 11:43:00





             Test Item    Value        Reference Range Interpretation Comments

 

             Sodium Lvl (test code = Sodium Lvl) 138          135-145           

        



[place] 334942-04-11 11:43:00





             Test Item    Value        Reference Range Interpretation Comments

 

             Potassium Lvl (test code = Potassium 4.9          3.5-5.1          

         



             Lvl)                                                



[facility]2022-04-11 11:43:00





             Test Item    Value        Reference Range Interpretation Comments

 

             Chloride Lvl (test code = Chloride Lvl) 113                  

            



[place] 334942-04-11 11:43:00





             Test Item    Value        Reference Range Interpretation Comments

 

             CO2 (test code = CO2) 16           24-32                     



[place] 334942-04-11 11:43:00





             Test Item    Value        Reference Range Interpretation Comments

 

             AGAP (test code = AGAP) 13.9         10.0-20.0                 



[place] 334942-04-11 11:43:00





             Test Item    Value        Reference Range Interpretation Comments

 

             Calcium Lvl (test code = Calcium Lvl) 9.0          8.5-10.5        

          



[facility]2022-04-11 11:43:00





             Test Item    Value        Reference Range Interpretation Comments

 

             eGFR (test code = eGFR) 86                                     



[facility] StephenvilleYyuyahpHIRQTEUWPV7009-25-65 11:43:00





             Test Item    Value        Reference Range Interpretation Comments

 

             WBC (test code = WBC) 10.2         3.7-10.4                  



[place] 236402-04-11 11:43:00





             Test Item    Value        Reference Range Interpretation Comments

 

             RBC (test code = RBC) 5.46         4.70-6.10                 



[facility] StephenvilleHtkzqkhBNRWRNBVWC5468-43-40 11:43:00





             Test Item    Value        Reference Range Interpretation Comments

 

             Hgb (test code = Hgb) 16.3         14.0-18.0                 



[place] 236402-04-11 11:43:00





             Test Item    Value        Reference Range Interpretation Comments

 

             Hct (test code = Hct) 50.0         42.0-54.0                 



[place] 236402-04-11 11:43:00





             Test Item    Value        Reference Range Interpretation Comments

 

             MCV (test code = MCV) 91.4         80.0-94.0                 



[place] 236402-04-11 11:43:00





             Test Item    Value        Reference Range Interpretation Comments

 

             MCH (test code = MCH) 29.9 pg      27.0-31.0                 



[place] 236402-04-11 11:43:00





             Test Item    Value        Reference Range Interpretation Comments

 

             MCHC (test code = MCHC) 32.7         32.0-36.0                 



[place] 236402-04-11 11:43:00





             Test Item    Value        Reference Range Interpretation Comments

 

             RDW (test code = RDW) 15.7         11.5-14.5                 



[facility] StephenvilleLndwvozLUASSBBEAP8404-10-59 11:43:00





             Test Item    Value        Reference Range Interpretation Comments

 

             Platelet (test code = Platelet) 321          133-450               

    



[place] 236402-04-11 11:43:00





             Test Item    Value        Reference Range Interpretation Comments

 

             MPV (test code = MPV) 8.6          7.4-10.4                  



[facility] StephenvilleZjjvggeIBOGSHQAMX1271-90-93 11:43:00





             Test Item    Value        Reference Range Interpretation Comments

 

             Segs (test code = Segs) 92.0         45.0-75.0                 



[place] 236402-04-11 11:43:00





             Test Item    Value        Reference Range Interpretation Comments

 

             Lymphocytes (test code = Lymphocytes) 5.2          20.0-40.0       

          



[place] 236402-04-11 11:43:00





             Test Item    Value        Reference Range Interpretation Comments

 

             Monocytes (test code = Monocytes) 1.6          2.0-12.0            

      



[facility] StephenvilleMvvhjudKQXHPDGSWL1986-39-94 11:43:00





             Test Item    Value        Reference Range Interpretation Comments

 

             Basophils (test code = 1.2          See_Comment                [Aut

omated message] The



             Basophils)                                          system which ge

nerated



                                                                 this result tra

nsmitted



                                                                 reference range

: <=1.0.



                                                                 The reference r

zhen was



                                                                 not used to int

erpret



                                                                 this result as



                                                                 normal/abnormal

.



[id number]43[facility]00





             Test Item    Value        Reference Range Interpretation Comments

 

             Neutrophils # (test code = Neutrophils 9.4          1.5-8.1        

           



             #)                                                  



[facility]irpevpMZDSMHSJRG1514-62-35 11:43:00





             Test Item    Value        Reference Range Interpretation Comments

 

             Lymphocytes # (test code = Lymphocytes 0.5          1.0-5.5        

           



             #)                                                  



[id number] 11:43:00





             Test Item    Value        Reference Range Interpretation Comments

 

             Monocytes # (test code 0.2          See_Comment                [Aut

omated message] The



             = Monocytes #)                                        system which 

generated



                                                                 this result tra

nsmitted



                                                                 reference range

: <=0.8.



                                                                 The reference r

zhen was



                                                                 not used to int

erpret



                                                                 this result as



                                                                 normal/abnormal

.



[facility]yahdWISJWOSPOT1519-77-09 11:43:00





             Test Item    Value        Reference Range Interpretation Comments

 

             Basophils # (test code 0.1          See_Comment                [Aut

omated message] The



             = Basophils #)                                        system which 

generated



                                                                 this result tra

nsmitted



                                                                 reference range

: <=0.2.



                                                                 The reference r

zhen was



                                                                 not used to int

erpret



                                                                 this result as



                                                                 normal/abnormal

.



[id number]





             Test Item    Value        Reference Range Interpretation Comments

 

             Coronavirus (COVID-19) Not Detected (22                        

   



             ZEYAD (test code = 4:18 AM)                               



             Coronavirus (COVID-19)                                        



             ZEYAD)                                                



[facility] TylerSaprrmbIFCMQOLKGQ0165-38-27 09:18:00





             Test Item    Value        Reference Range Interpretation Comments

 

             Coronavirus (COVID-19) Not Detected (22                        

   



             ZEYAD (test code = 4:18 AM)                               



             Coronavirus (COVID-19)                                        



             ZEYAD)                                                



[facility] [id number] 09:18:00





             Test Item    Value        Reference Range Interpretation Comments

 

             Coronavirus (COVID-19) Not Detected (22                        

   



             ZEYAD (test code = 4:18 AM)                               



             Coronavirus (COVID-19)                                        



             ZEYAD)                                                



[facility]:SUSC:PT:ISOLATE:ORDQN:[id number] 08:19:00





             Test Item    Value        Reference Range Interpretation Comments

 

             Culture: Urine (test >100,000 CFU/mL                           



             code = Culture: Providencia stuartii                           



             Urine)                                              



[facility]:SUSC:PT:ISOLATE:ORDQN:[id number] 08:19:00





             Test Item    Value        Reference Range Interpretation Comments

 

             Providencia stuartii Providencia stuartii                          

 



             (test code = Providencia                                        



             stuartii)                                           



[facility] AND [id number] 08:19:00





             Test Item    Value        Reference Range Interpretation Comments

 

             UA Color (test code = Yellow *NA*(22 3:19                      

     



             UA Color)    AM)                                    



[facility] AND [id number] 08:19:00





             Test Item    Value        Reference Range Interpretation Comments

 

             UA Turbidity (test code Marked *ABN*(22                        

   



             = UA Turbidity) 3:19 AM)                               



[facility] AND [id number] 08:19:00





             Test Item    Value        Reference Range Interpretation Comments

 

             UA Spec Grav (test code = UA Spec 1.013 1                          

      



             Grav)                                               



[facility] AND [id number] 08:19:00





             Test Item    Value        Reference Range Interpretation Comments

 

             UA pH (test code = UA pH) 5.0 1        5.0-8.0                   



[facility] AND [id number] 08:19:00





             Test Item    Value        Reference Range Interpretation Comments

 

             UA Protein (test code = UA Negative mg/dL                          

 



             Protein)                                            



[facility] AND [id number] 08:19:00





             Test Item    Value        Reference Range Interpretation Comments

 

             UA Glucose (test code = UA Negative mg/dL                          

 



             Glucose)                                            



[facility] AND [id number] 08:19:00





             Test Item    Value        Reference Range Interpretation Comments

 

             UA Ketones (test code = UA Negative mg/dL                          

 



             Ketones)                                            



[facility] AND [id number] 08:19:00





             Test Item    Value        Reference Range Interpretation Comments

 

             UA Bili (test code = Negative *NA*(22                          

 



             UA Bili)     3:19 AM)                               



[facility] AND [id number] 08:19:00





             Test Item    Value        Reference Range Interpretation Comments

 

             UA Blood (test code = Small *ABN*(22                           



             UA Blood)    3:19 AM)                               



[facility] AND [id number] 08:19:00





             Test Item    Value        Reference Range Interpretation Comments

 

             UA Urobilinogen (test code = UA no gt        0.1-1.0               

    



             Urobilinogen)                                        



[facility] AND [id number] 08:19:00





             Test Item    Value        Reference Range Interpretation Comments

 

             UA Nitrite (test code Positive *ABN*(22                        

   



             = UA Nitrite) 3:19 AM)                               



[facility] AND [id number] 08:19:00





             Test Item    Value        Reference Range Interpretation Comments

 

             UA Leuk Est (test code Large *ABN*(22 3:19                     

      



             = UA Leuk Est) AM)                                    



[facility] AND [id number] 08:19:00





             Test Item    Value        Reference Range Interpretation Comments

 

             UA WBC (test code = no gt        See_Comment                [Automa

huma message] The



             UA WBC)                                             system which ge

nerated this



                                                                 result transmit

huma



                                                                 reference range

: <=5. The



                                                                 reference range

 was not



                                                                 used to interpr

et this



                                                                 result as zayda

l/abnormal.



[facility] AND [id number] 08:19:00





             Test Item    Value        Reference Range Interpretation Comments

 

             UA RBC (test code = 8            See_Comment                [Automa

huma message] The



             UA RBC)                                             system which ge

nerated this



                                                                 result transmit

huma



                                                                 reference range

: <=2. The



                                                                 reference range

 was not



                                                                 used to interpr

et this



                                                                 result as zayda

l/abnormal.



[facility] [id number] 08:19:00





             Test Item    Value        Reference Range Interpretation Comments

 

             UA Bacteria (test code = UA Occasional /HPF                        

   



             Bacteria)                                           



[facility] [id number] 08:19:00





             Test Item    Value        Reference Range Interpretation Comments

 

             UA Mucus (test code = UA Mucus) Few /LPF                           

    



[facility] [id number] 08:19:00





             Test Item    Value        Reference Range Interpretation Comments

 

             UA Amorph Ivonne (test code = Occasional /HPF                        

   



             UA Amorph Ivonne)                                        



[facility] [id number] 08:19:00





             Test Item    Value        Reference Range Interpretation Comments

 

             UA Sq Epi (test code = UA Sq Epi) None Seen                        

      



[facility] [id number] [facility]





             Test Item    Value        Reference Range Interpretation Comments

 

             Culture: Urine (test Holding For Better                           



             code = Culture: Urine) Growth                                 



[facility]ORDQN:[id number] 08:19:00





             Test Item    Value        Reference Range Interpretation Comments

 

             Culture: Urine (test >100,000 CFU/mL                           



             code = Culture: Providencia stuartii                           



             Urine)                                              



[facility]:PT:ISOLATE:ORDQN:[id number] 08:19:00





             Test Item    Value        Reference Range Interpretation Comments

 

             Providencia stuartii Providencia stuartii                          

 



             (test code = Providencia                                        



             stuartii)                                           



[facility] [id number] 08:19:00





             Test Item    Value        Reference Range Interpretation Comments

 

             UA Color (test code = Yellow *NA*(22 3:19                      

     



             UA Color)    AM)                                    



[facility] [id number] 08:19:00





             Test Item    Value        Reference Range Interpretation Comments

 

             UA Turbidity (test code Marked *ABN*(22                        

   



             = UA Turbidity) 3:19 AM)                               



[facility] [id number] 08:19:00





             Test Item    Value        Reference Range Interpretation Comments

 

             UA Spec Grav (test code = UA Spec 1.013 1                          

      



             Grav)                                               



[facility] AND [id number] 08:19:00





             Test Item    Value        Reference Range Interpretation Comments

 

             UA pH (test code = UA pH) 5.0 1        5.0-8.0                   



[facility] AND [id number] 08:19:00





             Test Item    Value        Reference Range Interpretation Comments

 

             UA Protein (test code = UA Negative mg/dL                          

 



             Protein)                                            



[facility] AND [id number] 08:19:00





             Test Item    Value        Reference Range Interpretation Comments

 

             UA Glucose (test code = UA Negative mg/dL                          

 



             Glucose)                                            



[facility] AND [id number] 08:19:00





             Test Item    Value        Reference Range Interpretation Comments

 

             UA Ketones (test code = UA Negative mg/dL                          

 



             Ketones)                                            



[facility] AND [id number] 08:19:00





             Test Item    Value        Reference Range Interpretation Comments

 

             UA Bili (test code = Negative *NA*(22                          

 



             UA Bili)     3:19 AM)                               



[facility] AND [id number] 08:19:00





             Test Item    Value        Reference Range Interpretation Comments

 

             UA Blood (test code = Small *ABN*(22                           



             UA Blood)    3:19 AM)                               



[facility] AND [id number] 08:19:00





             Test Item    Value        Reference Range Interpretation Comments

 

             UA Urobilinogen (test code = UA no gt        0.1-1.0               

    



             Urobilinogen)                                        



[facility] AND [id number] 08:19:00





             Test Item    Value        Reference Range Interpretation Comments

 

             UA Nitrite (test code Positive *ABN*(22                        

   



             = UA Nitrite) 3:19 AM)                               



[facility] AND [id number] 08:19:00





             Test Item    Value        Reference Range Interpretation Comments

 

             UA Leuk Est (test code Large *ABN*(22 3:19                     

      



             = UA Leuk Est) AM)                                    



[facility] AND [id number] 08:19:00





             Test Item    Value        Reference Range Interpretation Comments

 

             UA WBC (test code = no gt        See_Comment                [Automa

huma message] The



             UA WBC)                                             system which ge

nerated this



                                                                 result transmit

huma



                                                                 reference range

: <=5. The



                                                                 reference range

 was not



                                                                 used to interpr

et this



                                                                 result as zayda

l/abnormal.



[facility] AND [id number] 08:19:00





             Test Item    Value        Reference Range Interpretation Comments

 

             UA RBC (test code = 8            See_Comment                [Automa

huma message] The



             UA RBC)                                             system which ge

nerated this



                                                                 result transmit

huma



                                                                 reference range

: <=2. The



                                                                 reference range

 was not



                                                                 used to interpr

et this



                                                                 result as zayda

l/abnormal.



[facility] [id number] 08:19:00





             Test Item    Value        Reference Range Interpretation Comments

 

             UA Bacteria (test code = UA Occasional /HPF                        

   



             Bacteria)                                           



[facility] [id number] 08:19:00





             Test Item    Value        Reference Range Interpretation Comments

 

             UA Mucus (test code = UA Mucus) Few /LPF                           

    



[facility] [id number] 08:19:00





             Test Item    Value        Reference Range Interpretation Comments

 

             UA Amorph Ivonne (test code = Occasional /HPF                        

   



             UA Amorph Ivonne)                                        



[facility] [id number] 08:19:00





             Test Item    Value        Reference Range Interpretation Comments

 

             UA Sq Epi (test code = UA Sq Epi) None Seen                        

      



[facility] [id number]





             Test Item    Value        Reference Range Interpretation Comments

 

             Culture: Urine (test Holding For Better                           



             code = Culture: Urine) Growth                                 



[facility]:ISOLATE:ORDQN:[id number] 08:19:00





             Test Item    Value        Reference Range Interpretation Comments

 

             Culture: Urine (test >100,000 CFU/mL                           



             code = Culture: Providencia stuartii                           



             Urine)                                              



[facility]PT:ISOLATE:ORDQN:[id number] 08:19:00





             Test Item    Value        Reference Range Interpretation Comments

 

             Providencia stuartii Providencia stuartii                          

 



             (test code = Providencia                                        



             stuartii)                                           



[facility] [id number] 08:19:00





             Test Item    Value        Reference Range Interpretation Comments

 

             UA Color (test code = Yellow *NA*(22 3:19                      

     



             UA Color)    AM)                                    



[facility] [id number] 08:19:00





             Test Item    Value        Reference Range Interpretation Comments

 

             UA Turbidity (test code Marked *ABN*(22                        

   



             = UA Turbidity) 3:19 AM)                               



[facility] [id number] 08:19:00





             Test Item    Value        Reference Range Interpretation Comments

 

             UA Spec Grav (test code = UA Spec 1.013 1                          

      



             Grav)                                               



[facility] AND [id number] 08:19:00





             Test Item    Value        Reference Range Interpretation Comments

 

             UA pH (test code = UA pH) 5.0 1        5.0-8.0                   



[facility] AND [id number] 08:19:00





             Test Item    Value        Reference Range Interpretation Comments

 

             UA Protein (test code = UA Negative mg/dL                          

 



             Protein)                                            



[facility] AND [id number] 08:19:00





             Test Item    Value        Reference Range Interpretation Comments

 

             UA Glucose (test code = UA Negative mg/dL                          

 



             Glucose)                                            



[facility] AND [id number] 08:19:00





             Test Item    Value        Reference Range Interpretation Comments

 

             UA Ketones (test code = UA Negative mg/dL                          

 



             Ketones)                                            



[facility] AND [id number] 08:19:00





             Test Item    Value        Reference Range Interpretation Comments

 

             UA Bili (test code = Negative *NA*(22                          

 



             UA Bili)     3:19 AM)                               



[facility] AND [id number] 08:19:00





             Test Item    Value        Reference Range Interpretation Comments

 

             UA Blood (test code = Small *ABN*(22                           



             UA Blood)    3:19 AM)                               



[facility] AND [id number] 08:19:00





             Test Item    Value        Reference Range Interpretation Comments

 

             UA Urobilinogen (test code = UA no gt        0.1-1.0               

    



             Urobilinogen)                                        



[facility] AND [id number] 08:19:00





             Test Item    Value        Reference Range Interpretation Comments

 

             UA Nitrite (test code Positive *ABN*(22                        

   



             = UA Nitrite) 3:19 AM)                               



[facility] AND [id number] 08:19:00





             Test Item    Value        Reference Range Interpretation Comments

 

             UA Leuk Est (test code Large *ABN*(22 3:19                     

      



             = UA Leuk Est) AM)                                    



[facility] AND [id number] 08:19:00





             Test Item    Value        Reference Range Interpretation Comments

 

             UA WBC (test code = no gt        See_Comment                [Automa

huma message] The



             UA WBC)                                             system which ge

nerated this



                                                                 result transmit

huma



                                                                 reference range

: <=5. The



                                                                 reference range

 was not



                                                                 used to interpr

et this



                                                                 result as zayda

l/abnormal.



[facility] AND [id number] 08:19:00





             Test Item    Value        Reference Range Interpretation Comments

 

             UA RBC (test code = 8            See_Comment                [Automa

huma message] The



             UA RBC)                                             system which ge

nerated this



                                                                 result transmit

huma



                                                                 reference range

: <=2. The



                                                                 reference range

 was not



                                                                 used to interpr

et this



                                                                 result as zayda

l/abnormal.



[facility] AND [id number] 08:19:00





             Test Item    Value        Reference Range Interpretation Comments

 

             UA Bacteria (test code = UA Occasional /HPF                        

   



             Bacteria)                                           



[facility] AND [id number] 08:19:00





             Test Item    Value        Reference Range Interpretation Comments

 

             UA Mucus (test code = UA Mucus) Few /LPF                           

    



[facility] AND [id number] 08:19:00





             Test Item    Value        Reference Range Interpretation Comments

 

             UA Amorph Ivonne (test code = Occasional /HPF                        

   



             UA Amorph Ivonne)                                        



[facility] AND [id number] 08:19:00





             Test Item    Value        Reference Range Interpretation Comments

 

             UA Sq Epi (test code = UA Sq Epi) None Seen                        

      



[facility] [id number] 08:19:00





             Test Item    Value        Reference Range Interpretation Comments

 

             Culture: Urine (test Holding For Better                           



             code = Culture: Urine) Growth                                 



[facility] [employer] [id number] 00:20:00





             Test Item    Value        Reference Range Interpretation Comments

 

             ABO/Rh (test code = ABO/Rh) AB POS                                 



[facility] [employer] [id number] 00:20:00





             Test Item    Value        Reference Range Interpretation Comments

 

             Antibody Scrn (test Negative (22 7:20                          

 



             code = Antibody Scrn) PM)                                    



[facility] [employer] [id number] 00:20:00





             Test Item    Value        Reference Range Interpretation Comments

 

             Glucose Lvl (test code = Glucose Lvl) 74           70-99           

          



[facility] [employer] [id number] 00:20:00





             Test Item    Value        Reference Range Interpretation Comments

 

             BUN (test code = BUN) 15           -                      



[facility] [id number] [facility]:00





             Test Item    Value        Reference Range Interpretation Comments

 

             Creatinine Lvl (test code = Creatinine 0.61         0.50-1.40      

           



             Lvl)                                                



[facility] [employer] [id number] 00:20:00





             Test Item    Value        Reference Range Interpretation Comments

 

             Sodium Lvl (test code = Sodium Lvl) 139          135-145           

        



[place] 334942-04-09 00:20:00





             Test Item    Value        Reference Range Interpretation Comments

 

             Potassium Lvl (test code = Potassium 4.9          3.5-5.1          

         



             Lvl)                                                



[place] 334942-04-09 00:20:00





             Test Item    Value        Reference Range Interpretation Comments

 

             Chloride Lvl (test code = Chloride Lvl) 105                  

            



[place] [id number] 00:20:00





             Test Item    Value        Reference Range Interpretation Comments

 

             CO2 (test code = CO2) 30           24-32                     



[place] 334942-04-09 00:20:00





             Test Item    Value        Reference Range Interpretation Comments

 

             Calcium Lvl (test code = Calcium Lvl) 9.5          8.5-10.5        

          



[place] 334942-04-09 00:20:00





             Test Item    Value        Reference Range Interpretation Comments

 

             AGAP (test code = AGAP) 8.9          10.0-20.0                 



[place] 334942-04-09 00:20:00





             Test Item    Value        Reference Range Interpretation Comments

 

             eGFR (test code = eGFR) 129                                    



[place] 334942-04-09 00:20:00





             Test Item    Value        Reference Range Interpretation Comments

 

             Lactic Acid Lvl (test code = Lactic 1.2          0.5-2.2           

        



             Acid Lvl)                                           



[place] 334942-04-09 00:20:00





             Test Item    Value        Reference Range Interpretation Comments

 

             Procalcitonin Lvl (test 0.09         See_Comment                [Au

tomated message]



             code = Procalcitonin Lvl)                                        

e system which



                                                                 generated this 

result



                                                                 transmitted ref

erence



                                                                 range: <=0.10. 

The



                                                                 reference range

 was not



                                                                 used to interpr

et this



                                                                 result as



                                                                 normal/abnormal

.



[place] 236402-04-09 00:20:00





             Test Item    Value        Reference Range Interpretation Comments

 

             WBC (test code = WBC) 10.1         3.7-10.4                  



[place] 236402-04-09 00:20:00





             Test Item    Value        Reference Range Interpretation Comments

 

             RBC (test code = RBC) 5.14         4.70-6.10                 



[place] [address]-04-09 00:20:00





             Test Item    Value        Reference Range Interpretation Comments

 

             Hgb (test code = Hgb) 15.5         14.0-18.0                 



[id number] [facility]:00





             Test Item    Value        Reference Range Interpretation Comments

 

             Hct (test code = Hct) 46.5         42.0-54.0                 



[facility] StephenvilleAbjiykoLDDRAATAGJ2853-05-12 00:20:00





             Test Item    Value        Reference Range Interpretation Comments

 

             MCV (test code = MCV) 90.5         80.0-94.0                 



[facility] Stephen[id number] 00:20:00





             Test Item    Value        Reference Range Interpretation Comments

 

             MCH (test code = MCH) 30.1 pg      27.0-31.0                 



[facility] StephenvilleDywwzboVRSQNIQZBZ1812-10-49 00:20:00





             Test Item    Value        Reference Range Interpretation Comments

 

             MCHC (test code = MCHC) 33.3         32.0-36.0                 



[facility] StephenvilleXiixjqdJSRSJMVDQH6575-04-19 00:20:00





             Test Item    Value        Reference Range Interpretation Comments

 

             RDW (test code = RDW) 15.7         11.5-14.5                 



[facility] StephenvilleUrltazlMYRCYXLLKB7653-93-49 00:20:00





             Test Item    Value        Reference Range Interpretation Comments

 

             Platelet (test code = Platelet) 302          133-450               

    



[facility] StephenvilleQycwuhqUWPRKURETX5989-59-40 00:20:00





             Test Item    Value        Reference Range Interpretation Comments

 

             MPV (test code = MPV) 8.9          7.4-10.4                  



[facility] StephenvilleJxzuofvZLUUSXZDLZ8396-12-60 00:20:00





             Test Item    Value        Reference Range Interpretation Comments

 

             Sed Rate (test code = 17           See_Comment                [Auto

mated message] The



             Sed Rate)                                           system which ge

nerated this



                                                                 result transmit

huma



                                                                 reference range

: <=15. The



                                                                 reference range

 was not



                                                                 used to interpr

et this



                                                                 result as zayda

l/abnormal.



[facility] StephenvilleJrerudfEGOTSYFTDM0724-33-12 00:20:00





             Test Item    Value        Reference Range Interpretation Comments

 

             PT (test code = PT) 13.1 s       12.0-14.7                 



[facility] StephenvilleNnfmjonZQRSLOUKNN0979-71-53 00:20:00





             Test Item    Value        Reference Range Interpretation Comments

 

             INR (test code = INR) 1.00 1       0.85-1.17                 



[place] 236402-04-09 00:20:00





             Test Item    Value        Reference Range Interpretation Comments

 

             PTT (test code = PTT) 31.5 s       22.9-35.8                 



[place] 236402-04-09 00:20:00





             Test Item    Value        Reference Range Interpretation Comments

 

             Segs (test code = Segs) 68.7         45.0-75.0                 



[facility] StephenvilleLcdtbosTYLHIQKDEA6110-30-95 00:20:00





             Test Item    Value        Reference Range Interpretation Comments

 

             Lymphocytes (test code = Lymphocytes) 19.6         20.0-40.0       

          



[facility] StephenvilleCyfzhayFZHHUMCCEW4021-94-34 00:20:00





             Test Item    Value        Reference Range Interpretation Comments

 

             Monocytes (test code = Monocytes) 9.4          2.0-12.0            

      



[facility] StephenvilleTukownuXENFWIKLUH1898-33-16 00:20:00





             Test Item    Value        Reference Range Interpretation Comments

 

             Eosinophils (test code = 1.4          See_Comment                [A

utomated message] The



             Eosinophils)                                        system which ge

nerated



                                                                 this result tra

nsmitted



                                                                 reference range

: <=4.0.



                                                                 The reference r

zhen was



                                                                 not used to int

erpret



                                                                 this result as



                                                                 normal/abnormal

.



[facility] StephenvilleOfsteuxTZSQATTXOI7916-80-74 00:20:00





             Test Item    Value        Reference Range Interpretation Comments

 

             Basophils (test code = 0.9          See_Comment                [Aut

omated message] The



             Basophils)                                          system which ge

nerated



                                                                 this result tra

nsmitted



                                                                 reference range

: <=1.0.



                                                                 The reference r

zhen was



                                                                 not used to int

erpret



                                                                 this result as



                                                                 normal/abnormal

.



[facility] StephenvilleMlsbfipUIDYIEZDOZ0771-55-57 00:20:00





             Test Item    Value        Reference Range Interpretation Comments

 

             Neutrophils # (test code = Neutrophils 6.9          1.5-8.1        

           



             #)                                                  



[facility] StephenvilleCagzzeeJVZJVJFTZL1645-89-66 00:20:00





             Test Item    Value        Reference Range Interpretation Comments

 

             Lymphocytes # (test code = Lymphocytes 2.0          1.0-5.5        

           



             #)                                                  



[place] 236402-04-09 00:20:00





             Test Item    Value        Reference Range Interpretation Comments

 

             Monocytes # (test code 1.0          See_Comment                [Aut

omated message] The



             = Monocytes #)                                        system which 

generated



                                                                 this result tra

nsmitted



                                                                 reference range

: <=0.8.



                                                                 The reference r

zhen was



                                                                 not used to int

erpret



                                                                 this result as



                                                                 normal/abnormal

.



[place] 236402-04-09 00:20:00





             Test Item    Value        Reference Range Interpretation Comments

 

             Eosinophils # (test code 0.1          See_Comment                [A

utomated message] The



             = Eosinophils #)                                        system whic

h generated



                                                                 this result tra

nsmitted



                                                                 reference range

: <=0.5.



                                                                 The reference r

zhen was



                                                                 not used to int

erpret



                                                                 this result as



                                                                 normal/abnormal

.



[facility] MohkkkdAYNRYNMFIA1839-97-02 00:20:00





             Test Item    Value        Reference Range Interpretation Comments

 

             Basophils # (test code 0.1          See_Comment                [Aut

omated message] The



             = Basophils #)                                        system which 

generated



                                                                 this result tra

nsmitted



                                                                 reference range

: <=0.2.



                                                                 The reference r

zhen was



                                                                 not used to int

erpret



                                                                 this result as



                                                                 normal/abnormal

.



[facility] RakyafzVDPDHBMXBK4531-06-44 00:20:00





             Test Item    Value        Reference Range Interpretation Comments

 

             C-REACTIVE PROTEIN (test code = 13.2                               

    



             C-REACTIVE PROTEIN)                                        



[facility] [employer] [id number] 00:20:00





             Test Item    Value        Reference Range Interpretation Comments

 

             ABO/Rh (test code = ABO/Rh) AB POS                                 



[facility] [employer] [id number] 00:20:00





             Test Item    Value        Reference Range Interpretation Comments

 

             Antibody Scrn (test Negative (22 7:20                          

 



             code = Antibody Scrn) PM)                                    



[facility] [employer] [id number] 00:20:00





             Test Item    Value        Reference Range Interpretation Comments

 

             Glucose Lvl (test code = Glucose Lvl) 74           70-99           

          



[facility] [employer]2022-04-09 00:20:00





             Test Item    Value        Reference Range Interpretation Comments

 

             BUN (test code = BUN) 15           -22                      



[facility] [employer]2022-04-09 00:20:00





             Test Item    Value        Reference Range Interpretation Comments

 

             Creatinine Lvl (test code = Creatinine 0.61         0.50-1.40      

           



             Lvl)                                                



[facility] [employer]2022-04-09 00:20:00





             Test Item    Value        Reference Range Interpretation Comments

 

             Sodium Lvl (test code = Sodium Lvl) 139          135-145           

        



[facility] [employer]2022-04-09 00:20:00





             Test Item    Value        Reference Range Interpretation Comments

 

             Potassium Lvl (test code = Potassium 4.9          3.5-5.1          

         



             Lvl)                                                



[facility] [employer] [id number] 00:20:00





             Test Item    Value        Reference Range Interpretation Comments

 

             Chloride Lvl (test code = Chloride Lvl) 105                  

            



[facility] [employer] [id number] 00:20:00





             Test Item    Value        Reference Range Interpretation Comments

 

             CO2 (test code = CO2) 30           24-32                     



[place] 334942-04-09 00:20:00





             Test Item    Value        Reference Range Interpretation Comments

 

             Calcium Lvl (test code = Calcium Lvl) 9.5          8.5-10.5        

          



[place] 334942-04-09 00:20:00





             Test Item    Value        Reference Range Interpretation Comments

 

             AGAP (test code = AGAP) 8.9          10.0-20.0                 



[place] 334942-04-09 00:20:00





             Test Item    Value        Reference Range Interpretation Comments

 

             eGFR (test code = eGFR) 129                                    



[facility]2022-04-09 00:20:00





             Test Item    Value        Reference Range Interpretation Comments

 

             Lactic Acid Lvl (test code = Lactic 1.2          0.5-2.2           

        



             Acid Lvl)                                           



[place] 334942-04-09 00:20:00





             Test Item    Value        Reference Range Interpretation Comments

 

             Procalcitonin Lvl (test 0.09         See_Comment                [Au

tomated message]



             code = Procalcitonin Lvl)                                        

e system which



                                                                 generated this 

result



                                                                 transmitted ref

erence



                                                                 range: <=0.10. 

The



                                                                 reference range

 was not



                                                                 used to interpr

et this



                                                                 result as



                                                                 normal/abnormal

.



[facility] StephenvilleRbkmzwhNYIHQLUHYY0886-42-96 00:20:00





             Test Item    Value        Reference Range Interpretation Comments

 

             WBC (test code = WBC) 10.1         3.7-10.4                  



[place] 236402-04-09 00:20:00





             Test Item    Value        Reference Range Interpretation Comments

 

             RBC (test code = RBC) 5.14         4.70-6.10                 



[place] 236402-04-09 00:20:00





             Test Item    Value        Reference Range Interpretation Comments

 

             Hgb (test code = Hgb) 15.5         14.0-18.0                 



[place] 236402-04-09 00:20:00





             Test Item    Value        Reference Range Interpretation Comments

 

             Hct (test code = Hct) 46.5         42.0-54.0                 



[place] 236402-04-09 00:20:00





             Test Item    Value        Reference Range Interpretation Comments

 

             MCV (test code = MCV) 90.5         80.0-94.0                 



[place] 236402-04-09 00:20:00





             Test Item    Value        Reference Range Interpretation Comments

 

             MCH (test code = MCH) 30.1 pg      27.0-31.0                 



[place] 236402-04-09 00:20:00





             Test Item    Value        Reference Range Interpretation Comments

 

             MCHC (test code = MCHC) 33.3         32.0-36.0                 



[facility] StephenvilleFwvzgaiYWCQZFJAVG3102-92-59 00:20:00





             Test Item    Value        Reference Range Interpretation Comments

 

             RDW (test code = RDW) 15.7         11.5-14.5                 



[place] 236402-04-09 00:20:00





             Test Item    Value        Reference Range Interpretation Comments

 

             Platelet (test code = Platelet) 302          133-450               

    



[facility] StephenvilleCjrygriZCRZJFECXS1904-65-19 00:20:00





             Test Item    Value        Reference Range Interpretation Comments

 

             MPV (test code = MPV) 8.9          7.4-10.4                  



[facility] StephenvilleYhssqjcYAYKCJOENT9380-81-47 00:20:00





             Test Item    Value        Reference Range Interpretation Comments

 

             Sed Rate (test code = 17           See_Comment                [Auto

mated message] The



             Sed Rate)                                           system which ge

nerated this



                                                                 result transmit

huma



                                                                 reference range

: <=15. The



                                                                 reference range

 was not



                                                                 used to interpr

et this



                                                                 result as zayda

l/abnormal.



[facility] StephenvilleHmvlkgnYPDRWHZWZY5782-54-39 00:20:00





             Test Item    Value        Reference Range Interpretation Comments

 

             PT (test code = PT) 13.1 s       12.0-14.7                 



[place] 236402-04-09 00:20:00





             Test Item    Value        Reference Range Interpretation Comments

 

             INR (test code = INR) 1.00 1       0.85-1.17                 



[place] 236402-04-09 00:20:00





             Test Item    Value        Reference Range Interpretation Comments

 

             PTT (test code = PTT) 31.5 s       22.9-35.8                 



[place] 236402-04-09 00:20:00





             Test Item    Value        Reference Range Interpretation Comments

 

             Segs (test code = Segs) 68.7         45.0-75.0                 



[place] 15385-04-09 00:20:00





             Test Item    Value        Reference Range Interpretation Comments

 

             Lymphocytes (test code = Lymphocytes) 19.6         20.0-40.0       

          



[place] 15385-04-09 00:20:00





             Test Item    Value        Reference Range Interpretation Comments

 

             Monocytes (test code = Monocytes) 9.4          2.0-12.0            

      



[place] 15385-04-09 00:20:00





             Test Item    Value        Reference Range Interpretation Comments

 

             Eosinophils (test code = 1.4          See_Comment                [A

utomated message] The



             Eosinophils)                                        system which ge

nerated



                                                                 this result tra

nsmitted



                                                                 reference range

: <=4.0.



                                                                 The reference r

zhen was



                                                                 not used to int

erpret



                                                                 this result as



                                                                 normal/abnormal

.



[facility]jpf[facility]SL[facility]AMFYK9895[facility]





             Test Item    Value        Reference Range Interpretation Comments

 

             Basophils (test code = 0.9          See_Comment                [Aut

omated message] The



             Basophils)                                          system which ge

nerated



                                                                 this result tra

nsmitted



                                                                 reference range

: <=1.0.



                                                                 The reference r

zhen was



                                                                 not used to int

erpret



                                                                 this result as



                                                                 normal/abnormal

.



[facility] StephenvilleLnyliskYCLZSVTPKF9829-54-80 00:20:00





             Test Item    Value        Reference Range Interpretation Comments

 

             Neutrophils # (test code = Neutrophils 6.9          1.5-8.1        

           



             #)                                                  



[facility]villeRlloisrRSIEYKKZAT8166-50-51 00:20:00





             Test Item    Value        Reference Range Interpretation Comments

 

             Lymphocytes # (test code = Lymphocytes 2.0          1.0-5.5        

           



             #)                                                  



[facility]villeTrszwcpKNWUQXNOUA5768-34-68 00:20:00





             Test Item    Value        Reference Range Interpretation Comments

 

             Monocytes # (test code 1.0          See_Comment                [Aut

omated message] The



             = Monocytes #)                                        system which 

generated



                                                                 this result tra

nsmitted



                                                                 reference range

: <=0.8.



                                                                 The reference r

zhen was



                                                                 not used to int

erpret



                                                                 this result as



                                                                 normal/abnormal

.



[facility] StephenvillePeocoavSQPWYBXITW1222-44-47 00:20:00





             Test Item    Value        Reference Range Interpretation Comments

 

             Eosinophils # (test code 0.1          See_Comment                [A

utomated message] The



             = Eosinophils #)                                        system [facility]

h generated



                                                                 this result tra

nsmitted



                                                                 reference range

: <=0.5.



                                                                 The reference r

zhen was



                                                                 not used to int

erpret



                                                                 this result as



                                                                 normal/abnormal

.



[facility] StephenvilleSgbscfkKMFLVXPNPJ6264-42-02 00:20:00





             Test Item    Value        Reference Range Interpretation Comments

 

             Basophils # (test code 0.1          See_Comment                [Aut

omated message] The



             = Basophils #)                                        system which 

generated



                                                                 this result tra

nsmitted



                                                                 reference range

: <=0.2.



                                                                 The reference r

zhen was



                                                                 not used to int

erpret



                                                                 this result as



                                                                 normal/abnormal

.



[facility] CenterHysheuaNISOUEYVYH9406-47-30 00:20:00





             Test Item    Value        Reference Range Interpretation Comments

 

             C-REACTIVE PROTEIN (test code = 13.2                               

    



             C-REACTIVE PROTEIN)                                        



[facility] [id number] 00:20:00





             Test Item    Value        Reference Range Interpretation Comments

 

             ABO/Rh (test code = ABO/Rh) AB POS                                 



[facility]IS[facility] [id number] 00:20:00





             Test Item    Value        Reference Range Interpretation Comments

 

             Antibody Scrn (test Negative (22 7:20                          

 



             code = Antibody Scrn) PM)                                    



[facility] [employer] [id number] 00:20:00





             Test Item    Value        Reference Range Interpretation Comments

 

             Glucose Lvl (test code = Glucose Lvl) 74           70-99           

          



[facility] [employer] [id number] 00:20:00





             Test Item    Value        Reference Range Interpretation Comments

 

             BUN (test code = BUN) 15           7-22                      



[facility] [employer] [id number] 00:20:00





             Test Item    Value        Reference Range Interpretation Comments

 

             Creatinine Lvl (test code = Creatinine 0.61         0.50-1.40      

           



             Lvl)                                                



[facility] [employer] [id number] 00:20:00





             Test Item    Value        Reference Range Interpretation Comments

 

             Sodium Lvl (test code = Sodium Lvl) 139          135-145           

        



[facility] [employer] [id number] 00:20:00





             Test Item    Value        Reference Range Interpretation Comments

 

             Potassium Lvl (test code = Potassium 4.9          3.5-5.1          

         



             Lvl)                                                



[facility] [employer] [id number] 00:20:00





             Test Item    Value        Reference Range Interpretation Comments

 

             Chloride Lvl (test code = Chloride Lvl) 105                  

            



[facility] [employer] [id number] 00:20:00





             Test Item    Value        Reference Range Interpretation Comments

 

             CO2 (test code = CO2) 30           24-32                     



[facility] [employer] [id number] 00:20:00





             Test Item    Value        Reference Range Interpretation Comments

 

             Calcium Lvl (test code = Calcium Lvl) 9.5          8.5-10.5        

          



[facility] [employer] [id number] 00:20:00





             Test Item    Value        Reference Range Interpretation Comments

 

             AGAP (test code = AGAP) 8.9          10.0-20.0                 



[facility] [employer] [id number] 00:20:00





             Test Item    Value        Reference Range Interpretation Comments

 

             eGFR (test code = eGFR) 129                                    



[facility] [employer] [id number] 00:20:00





             Test Item    Value        Reference Range Interpretation Comments

 

             Lactic Acid Lvl (test code = Lactic 1.2          0.5-2.2           

        



             Acid Lvl)                                           



[facility]2022-04-09 00:20:00





             Test Item    Value        Reference Range Interpretation Comments

 

             Procalcitonin Lvl (test 0.09         See_Comment                [Au

tomated message]



             code = Procalcitonin Lvl)                                        

e system which



                                                                 generated this 

result



                                                                 transmitted ref

erence



                                                                 range: <=0.10. 

The



                                                                 reference range

 was not



                                                                 used to interpr

et this



                                                                 result as



                                                                 normal/abnormal

.



[facility]villeJycfxzvRPJCETEDEZ6893-47-15 00:20:00





             Test Item    Value        Reference Range Interpretation Comments

 

             WBC (test code = WBC) 10.1         3.7-10.4                  



[facility] StephenvilleSoiecceNYIXVEUPED6755-27-12 00:20:00





             Test Item    Value        Reference Range Interpretation Comments

 

             RBC (test code = RBC) 5.14         4.70-6.10                 



[facility]villeCrygitoJFEQAXPKGB9330-24-99 00:20:00





             Test Item    Value        Reference Range Interpretation Comments

 

             Hgb (test code = Hgb) 15.5         14.0-18.0                 



[facility] StephenvilleYlqyxnwVFIZWGSXYY5859-44-60 00:20:00





             Test Item    Value        Reference Range Interpretation Comments

 

             Hct (test code = Hct) 46.5         42.0-54.0                 



[place]402-04-09 00:20:00





             Test Item    Value        Reference Range Interpretation Comments

 

             MCV (test code = MCV) 90.5         80.0-94.0                 



[facility] StephenvilleQgraukyEVUXAPRGWX3505-17-32 00:20:00





             Test Item    Value        Reference Range Interpretation Comments

 

             MCH (test code = MCH) 30.1 pg      27.0-31.0                 



[facility] StephenvilleVkmruzyWWDBKTSGRX4623-50-82 00:20:00





             Test Item    Value        Reference Range Interpretation Comments

 

             MCHC (test code = MCHC) 33.3         32.0-36.0                 



[place] 236402-04-09 00:20:00





             Test Item    Value        Reference Range Interpretation Comments

 

             RDW (test code = RDW) 15.7         11.5-14.5                 



[place] 236402-04-09 00:20:00





             Test Item    Value        Reference Range Interpretation Comments

 

             Platelet (test code = Platelet) 302          133-450               

    



[facility] StephenvilleVuekuweHHLQIVZJZH1345-71-24 00:20:00





             Test Item    Value        Reference Range Interpretation Comments

 

             MPV (test code = MPV) 8.9          7.4-10.4                  



[place]402-04-09 00:20:00





             Test Item    Value        Reference Range Interpretation Comments

 

             Sed Rate (test code = 17           See_Comment                [Auto

mated message] The



             Sed Rate)                                           system which ge

nerated this



                                                                 result transmit

huam



                                                                 reference range

: <=15. The



                                                                 reference range

 was not



                                                                 used to interpr

et this



                                                                 result as zayda

l/abnormal.



[facility] StephenvilleCbcsluxTWECAUMKPC5424-33-93 00:20:00





             Test Item    Value        Reference Range Interpretation Comments

 

             PT (test code = PT) 13.1 s       12.0-14.7                 



[facility] StephenvilleJpqdhbcTXMMGHHYAQ1276-53-37 00:20:00





             Test Item    Value        Reference Range Interpretation Comments

 

             INR (test code = INR) 1.00 1       0.85-1.17                 



[place] 236402-04-09 00:20:00





             Test Item    Value        Reference Range Interpretation Comments

 

             PTT (test code = PTT) 31.5 s       22.9-35.8                 



[place] 236402-04-09 00:20:00





             Test Item    Value        Reference Range Interpretation Comments

 

             Segs (test code = Segs) 68.7         45.0-75.0                 



[place] 236402-04-09 00:20:00





             Test Item    Value        Reference Range Interpretation Comments

 

             Lymphocytes (test code = Lymphocytes) 19.6         20.0-40.0       

          



[place]402-04-09 00:20:00





             Test Item    Value        Reference Range Interpretation Comments

 

             Monocytes (test code = Monocytes) 9.4          2.0-12.0            

      



[place]402-04-09 00:20:00





             Test Item    Value        Reference Range Interpretation Comments

 

             Eosinophils (test code = 1.4          See_Comment                [A

utomated message] The



             Eosinophils)                                        system which ge

nerated



                                                                 this result tra

nsmitted



                                                                 reference range

: <=4.0.



                                                                 The reference r

zhen was



                                                                 not used to int

erpret



                                                                 this result as



                                                                 normal/abnormal

.



[place] 236402-04-09 00:20:00





             Test Item    Value        Reference Range Interpretation Comments

 

             Basophils (test code = 0.9          See_Comment                [Aut

omated message] The



             Basophils)                                          system which ge

nerated



                                                                 this result tra

nsmitted



                                                                 reference range

: <=1.0.



                                                                 The reference r

zhen was



                                                                 not used to int

erpret



                                                                 this result as



                                                                 normal/abnormal

.



[facility] StephenvilleOzeoucvIASKDWMXHQ3135-62-13 00:20:00





             Test Item    Value        Reference Range Interpretation Comments

 

             Neutrophils # (test code = Neutrophils 6.9          1.5-8.1        

           



             #)                                                  



[facility] StephenvilleLazbbdoSLYLCWVLBM0359-55-42 00:20:00





             Test Item    Value        Reference Range Interpretation Comments

 

             Lymphocytes # (test code = Lymphocytes 2.0          1.0-5.5        

           



             #)                                                  



[facility] StephenvilleQqnaergAZXGFEDJXA8876-89-45 00:20:00





             Test Item    Value        Reference Range Interpretation Comments

 

             Monocytes # (test code 1.0          See_Comment                [Aut

omated message] The



             = Monocytes #)                                        system which 

generated



                                                                 this result tra

nsmitted



                                                                 reference range

: <=0.8.



                                                                 The reference r

zhen was



                                                                 not used to int

erpret



                                                                 this result as



                                                                 normal/abnormal

.



[facility] StephenvilleFirscemICJTXLYHDN2455-18-20 00:20:00





             Test Item    Value        Reference Range Interpretation Comments

 

             Eosinophils # (test code 0.1          See_Comment                [A

utomated message] The



             = Eosinophils #)                                        system whic

h generated



                                                                 this result tra

nsmitted



                                                                 reference range

: <=0.5.



                                                                 The reference r

zhen was



                                                                 not used to int

erpret



                                                                 this result as



                                                                 normal/abnormal

.



[facility] StephenvilleLwaldybZCSLPIKCDQ2368-85-00 00:20:00





             Test Item    Value        Reference Range Interpretation Comments

 

             Basophils # (test code 0.1          See_Comment                [Aut

omated message] The



             = Basophils #)                                        system which 

generated



                                                                 this result tra

nsmitted



                                                                 reference range

: <=0.2.



                                                                 The reference r

zhen was



                                                                 not used to int

erpret



                                                                 this result as



                                                                 normal/abnormal

.



[facility] CenterLqxhspwMXOPRQPCUH3323-91-29 00:20:00





             Test Item    Value        Reference Range Interpretation Comments

 

             C-REACTIVE PROTEIN (test code = 13.2                               

    



             C-REACTIVE PROTEIN)                                        



[facility] WITH [id number] 04:47:32





             Test Item    Value        Reference Range Interpretation Comments

 

             WBC (test code =              See_Comment  H             [Automated



             6690-2)                                             message] The sy

stem



                                                                 which generated



                                                                 this result



                                                                 transmitted



                                                                 reference range

:



                                                                 4.20 - 10.70



                                                                 10*3/?L. The



                                                                 reference range

 was



                                                                 not used to



                                                                 interpret this



                                                                 result as



                                                                 normal/abnormal

.

 

             RBC (test code =              See_Comment                [Automated



             789-8)                                              message] The sy

stem



                                                                 which generated



                                                                 this result



                                                                 transmitted



                                                                 reference range

:



                                                                 4.26 - 5.52



                                                                 10*6/?L. The



                                                                 reference range

 was



                                                                 not used to



                                                                 interpret this



                                                                 result as



                                                                 normal/abnormal

.

 

             HGB (test code = 16.1 g/dL    12.2-16.4                 



             718-7)                                              

 

             HCT (test code = 47.2 %       38.4-49.3                 



             4544-3)                                             

 

             MCV (test code = 86.1 fL      81.7-95.6                 



             787-2)                                              

 

             MCH (test code = 29.4 pg      26.1-32.7                 



             785-6)                                              

 

             MCHC (test code = 34.1 g/dL    31.2-35.0                 



             786-4)                                              

 

             RDW-SD (test code = 45.7 fL      38.5-51.6                 



             79912-8)                                            

 

             RDW-CV (test code = 14.6 %       12.1-15.4                 



             788-0)                                              

 

             PLT (test code =              See_Comment                [Automated



             777-3)                                              message] The sy

stem



                                                                 which generated



                                                                 this result



                                                                 transmitted



                                                                 reference range

:



                                                                 150 - 328 10*3/

?L.



                                                                 The reference r

zhen



                                                                 was not used to



                                                                 interpret this



                                                                 result as



                                                                 normal/abnormal

.

 

             MPV (test code = 11.0 fL      9.8-13.0                  



             24752-8)                                            

 

             NRBC/100 WBC (test              See_Comment                [Automat

ed



             code = 7543829360)                                        message] 

The system



                                                                 which generated



                                                                 this result



                                                                 transmitted



                                                                 reference range

:



                                                                 0.0 - 10.0 /100



                                                                 WBCs. The refer

ence



                                                                 range was not u

sed



                                                                 to interpret th

is



                                                                 result as



                                                                 normal/abnormal

.

 

             NRBC x10^3 (test code <0.01        See_Comment                [Auto

mated



             = [id number])                                        message] The s

ystem



                                                                 which generated



                                                                 this result



                                                                 transmitted



                                                                 reference range

:



                                                                 10*3/?L. The



                                                                 reference range

 was



                                                                 not used to



                                                                 interpret this



                                                                 result as



                                                                 normal/abnormal

.

 

             GRAN MAT (NEUT) % 72.2 %                                 



             (test code = 770-8)                                        

 

             IMM GRAN % (test code 0.60 %                                 



             = 3758094179)                                        

 

             LYMPH % (test code = 17.7 %                                 



             736-9)                                              

 

             MONO % (test code = 7.4 %                                  



             5905-5)                                             

 

             EOS % (test code = 1.8 %                                  



             713-8)                                              

 

             BASO % (test code = 0.3 %                                  



             706-2)                                              

 

             GRAN MAT x10^3(ANC) 9.09 10*3/uL 1.99-6.95    H            



             (test code =                                        



             6127627130)                                         

 

             IMM GRAN x10^3 (test 0.07 10*3/uL 0.00-0.06    H            



             code = 1150518454)                                        

 

             LYMPH x10^3 (test code 2.22 10*3/uL 1.09-3.23                 



             = 731-0)                                            

 

             MONO x10^3 (test code 0.93 10*3/uL 0.36-1.02                 



             = 742-7)                                            

 

             EOS x10^3 (test code = 0.22 10*3/uL 0.06-0.53                 



             711-2)                                              

 

             BASO x10^3 (test code 0.04 10*3/uL 0.01-0.09                 



             = 704-7)                                            

 

             Lab Interpretation Abnormal                               



             (test code = 55043-2)                                        



[facility]. METABOLIC PANEL ([id number])2022 
04:36:41





             Test Item    Value        Reference Range Interpretation Comments

 

             NA (test code = 138 mmol/L   135-145                   



             4338121399)                                         

 

             K (test code = 4.6 mmol/L   3.5-5.0                   



             0564755925)                                         

 

             CL (test code = 105 mmol/L                       



             4012243138)                                         

 

             CO2 TOTAL (test code = 21 mmol/L    23-31        L            



             7315458873)                                         

 

             AGAP (test code =              2-16                      



             5762758859)                                         

 

             BUN (test code = 13 mg/dL     7-23                      



             3807627051)                                         

 

             GLUCOSE (test code = 167 mg/dL           H            



             7906132298)                                         

 

             CREATININE (test code = 0.48 mg/dL   0.60-1.25    L            



             3601907903)                                         

 

             TOTAL BILI (test code = 0.8 mg/dL    0.1-1.1                   



             8852466239)                                         

 

             CALCIUM (test code = 9.3 mg/dL    8.6-10.6                  



             2097993841)                                         

 

             T PROTEIN (test code = 7.6 g/dL     6.3-8.2                   



             9124975144)                                         

 

             ALBUMIN (test code = 4.2 g/dL     3.5-5.0                   



             3799975822)                                         

 

             ALK PHOS (test code = 98 U/L                           



             1362183272)                                         

 

             ALTv (test code = 71 U/L       5-50         H            



             1742-6)                                             

 

             AST(SGOT) (test code = 36 U/L       13-40                     



             7139838317)                                         

 

             eGFR (test code =              mL/min/1.73m2              



             0934380610)                                         

 

             MAL (test code = MAL) Association of                           



                          Glomerular Filtration                           



                          Rate (GFR) and Staging                           



                          of Kidney Disease*                           



                          +---------------------                           



                          --+-------------------                           



                          --+-------------------                           



                          ------+| GFR                           



                          (mL/min/1.73 m2) ?|                           



                          With Kidney Damage ?|                           



                          ?Without Kidney                           



                          Damage+---------------                           



                          --------+-------------                           



                          --------+-------------                           



                          ------------+| ?>90 ?                           



                          ? ? ? ? ? ? ? ?|                           



                          ?Stage one ? ? ? ? ?|                           



                          ? Normal ? ? ? ? ? ? ?                           



                          ?+--------------------                           



                          ---+------------------                           



                          ---+------------------                           



                          -------+| ?60-89 ? ? ?                           



                          ? ? ? ? ?| ?Stage two                           



                          ? ? ? ? ?| ? Decreased                           



                          GFR ? ? ? ?                            



                          +---------------------                           



                          --+-------------------                           



                          --+-------------------                           



                          ------+| ?30-59 ? ? ?                           



                          ? ? ? ? ?| ?Stage                           



                          three ? ? ? ?| ? Stage                           



                          three ? ? ? ? ?                           



                          +---------------------                           



                          --+-------------------                           



                          --+-------------------                           



                          ------+| ?15-29 ? ? ?                           



                          ? ? ? ? ?| ?Stage four                           



                          ? ? ? ? | ? Stage four                           



                          ? ? ? ? ?                              



                          ?+--------------------                           



                          ---+------------------                           



                          ---+------------------                           



                          -------+| ?<15 (or                           



                          dialysis) ? ?| ?Stage                           



                          five ? ? ? ? | ? Stage                           



                          five ? ? ? ? ?                           



                          ?+--------------------                           



                          ---+------------------                           



                          ---+------------------                           



                          -------+ *Each stage                           



                          assumes the associated                           



                          GFR level has been in                           



                          effect for at least                           



                          three months. ?Stages                           



                          1 to 5, with or                           



                          without kidney                           



                          disease, indicate                           



                          chronic kidney                           



                          disease. Notes:                           



                          Determination of                           



                          stages one and two                           



                          (with eGFR                             



                          >59mL/min/1.73 m2)                           



                          requires estimation of                           



                          kidney damage for at                           



                          least three months as                           



                          defined by structural                           



                          or functional                           



                          abnormalities of the                           



                          kidney, manifested by                           



                          either:Pathological                           



                          abnormalities or                           



                          Markers of kidney                           



                          damage (including                           



                          abnormalities in the                           



                          composition of the                           



                          blood or urine or                           



                          abnormalities in                           



                          imaging tests).                           

 

             Lab Interpretation Abnormal                               



             (test code = 50314-2)                                        



[facility]





             Test Item    Value        Reference Range Interpretation Comments

 

             MAGNESIUM (test code = 0666061122) 1.6 mg/dL    1.7-2.4      L     

       

 

             Lab Interpretation (test code = Abnormal                           

    



             79167-6)                                            



[facility] [id number] 07[facility]52:00





             Test Item    Value        Reference Range Interpretation Comments

 

             ABO/Rh (test code = ABO/Rh) AB POS                                 



[facility] [employer] [id number] 07:52:00





             Test Item    Value        Reference Range Interpretation Comments

 

             Antibody Scrn (test Negative (3/28/22 2:52                         

  



             code = Antibody Scrn) AM)                                    



[employer] [id number] 07:52:00





             Test Item    Value        Reference Range Interpretation Comments

 

             Glucose Lvl (test code = Glucose Lvl) 291          70-99           

          



[employer] [id number] 07:52:00





             Test Item    Value        Reference Range Interpretation Comments

 

             BUN (test code = BUN) 16                                 



[employer] [id number] 07:52:00





             Test Item    Value        Reference Range Interpretation Comments

 

             Creatinine Lvl (test code = Creatinine 0.83         0.50-1.40      

           



             Lvl)                                                



[facility] [employer] [id number] 07:52:00





             Test Item    Value        Reference Range Interpretation Comments

 

             ABO/Rh (test code = ABO/Rh) AB POS                                 



[facility] [employer] [id number] 07:52:00





             Test Item    Value        Reference Range Interpretation Comments

 

             Antibody Scrn (test Negative (3/28/22 2:52                         

  



             code = Antibody Scrn) AM)                                    



[facility] [employer] [id number] 07:52:00





             Test Item    Value        Reference Range Interpretation Comments

 

             Glucose Lvl (test code = Glucose Lvl) 291          70-99           

          



[facility] [employer] [id number] 07:52:00





             Test Item    Value        Reference Range Interpretation Comments

 

             BUN (test code = BUN) 16                                 



[employer] [id number] 07:52:00





             Test Item    Value        Reference Range Interpretation Comments

 

             Creatinine Lvl (test code = Creatinine 0.83         0.50-1.40      

           



             Lvl)                                                



[employer] [id number] 07:52:00





             Test Item    Value        Reference Range Interpretation Comments

 

             Sodium Lvl (test code = Sodium Lvl) 138          135-145           

        



[employer] [id number] 07:52:00





             Test Item    Value        Reference Range Interpretation Comments

 

             Potassium Lvl (test code = Potassium 4.7          3.5-5.1          

         



             Lvl)                                                



[employer] [id number] 07:52:00





             Test Item    Value        Reference Range Interpretation Comments

 

             Sodium Lvl (test code = Sodium Lvl) 138          135-145           

        



[employer] [id number] 07:52:00





             Test Item    Value        Reference Range Interpretation Comments

 

             Chloride Lvl (test code = Chloride Lvl) 113                  

            



[employer] [id number] 07:52:00





             Test Item    Value        Reference Range Interpretation Comments

 

             CO2 (test code = CO2)                      



[place] 334942-03-28 07:52:00





             Test Item    Value        Reference Range Interpretation Comments

 

             AGAP (test code = AGAP) 11.7         10.0-20.0                 



[place] 334942-03-28 07:52:00





             Test Item    Value        Reference Range Interpretation Comments

 

             Calcium Lvl (test code = Calcium Lvl) 9.4          8.5-10.5        

          



[place] 334942-03-28 07:52:00





             Test Item    Value        Reference Range Interpretation Comments

 

             eGFR (test code = eGFR) 113                                    



[place] 236402-03-28 07:52:00





             Test Item    Value        Reference Range Interpretation Comments

 

             WBC (test code = WBC) 5.5          3.7-10.4                  



[place] 236402-03-28 07:52:00





             Test Item    Value        Reference Range Interpretation Comments

 

             RBC (test code = RBC) 5.53         4.70-6.10                 



[place] 236402-03-28 07:52:00





             Test Item    Value        Reference Range Interpretation Comments

 

             Hgb (test code = Hgb) 16.3         14.0-18.0                 



[place] 236402-03-28 07:52:00





             Test Item    Value        Reference Range Interpretation Comments

 

             Hct (test code = Hct) 50.5         42.0-54.0                 



[place] 15385-03-28 07:52:00





             Test Item    Value        Reference Range Interpretation Comments

 

             MCV (test code = MCV) 91.3         80.0-94.0                 



[facility]2022-03-28 07:52:00





             Test Item    Value        Reference Range Interpretation Comments

 

             Potassium Lvl (test code = Potassium 4.7          3.5-5.1          

         



             Lvl)                                                



[place] 236402-03-28 07:52:00





             Test Item    Value        Reference Range Interpretation Comments

 

             MCH (test code = MCH) 29.5 pg      27.0-31.0                 



[place] 236402-03-28 07:52:00





             Test Item    Value        Reference Range Interpretation Comments

 

             MCHC (test code = MCHC) 32.3         32.0-36.0                 



[place] 236402-03-28 07:52:00





             Test Item    Value        Reference Range Interpretation Comments

 

             RDW (test code = RDW) 15.4         11.5-14.5                 



[place] 236402-03-28 07:52:00





             Test Item    Value        Reference Range Interpretation Comments

 

             Platelet (test code = Platelet) 210          133-450               

    



[place] 236402-03-28 07:52:00





             Test Item    Value        Reference Range Interpretation Comments

 

             MPV (test code = MPV) 9.3          7.4-10.4                  



[place] 334942-03-28 07:52:00





             Test Item    Value        Reference Range Interpretation Comments

 

             Chloride Lvl (test code = Chloride Lvl) 113                  

            



[place] 334942-03-28 07:52:00





             Test Item    Value        Reference Range Interpretation Comments

 

             CO2 (test code = CO2) 18           24-32                     



[place] 334942-03-28 07:52:00





             Test Item    Value        Reference Range Interpretation Comments

 

             AGAP (test code = AGAP) 11.7         10.0-20.0                 



[facility]2022-03-28 07:52:00





             Test Item    Value        Reference Range Interpretation Comments

 

             Calcium Lvl (test code = Calcium Lvl) 9.4          8.5-10.5        

          



[facility]2022-03-28 07:52:00





             Test Item    Value        Reference Range Interpretation Comments

 

             eGFR (test code = eGFR) 113                                    



[facility]





             Test Item    Value        Reference Range Interpretation Comments

 

             WBC (test code = WBC) 5.5          3.7-10.4                  



[facility]GH[facility]SSGJFD4400-60-12 07:52:00





             Test Item    Value        Reference Range Interpretation Comments

 

             RBC (test code = RBC) 5.53         4.70-6.10                 



[place] 236402-03-28 07:52:00





             Test Item    Value        Reference Range Interpretation Comments

 

             Hgb (test code = Hgb) 16.3         14.0-18.0                 



[place] 236402-03-28 07:52:00





             Test Item    Value        Reference Range Interpretation Comments

 

             Hct (test code = Hct) 50.5         42.0-54.0                 



[place] 236402-03-28 07:52:00





             Test Item    Value        Reference Range Interpretation Comments

 

             MCV (test code = MCV) 91.3         80.0-94.0                 



[place] 236402-03-28 07:52:00





             Test Item    Value        Reference Range Interpretation Comments

 

             MCH (test code = MCH) 29.5 pg      27.0-31.0                 



[address]03-28 07:52:00





             Test Item    Value        Reference Range Interpretation Comments

 

             MCHC (test code = MCHC) 32.3         32.0-36.0                 



[facility] UqlucqcYCHRAGLKLO0820-52-73 07:52:00





             Test Item    Value        Reference Range Interpretation Comments

 

             RDW (test code = RDW) 15.4         11.5-14.5                 



[facility]WH4109[facility]44-37 07:52:00





             Test Item    Value        Reference Range Interpretation Comments

 

             Platelet (test code = Platelet) 210          133-450               

    



[facility]qpjaswQVMQQMCQRG6899-55-78 07:52:00





             Test Item    Value        Reference Range Interpretation Comments

 

             MPV (test code = MPV) 9.3          7.4-10.4                  



[facility] [employer] [id number] 07:52:00





             Test Item    Value        Reference Range Interpretation Comments

 

             ABO/Rh (test code = ABO/Rh) AB POS                                 



[facility] [employer] [id number] 07:52:00





             Test Item    Value        Reference Range Interpretation Comments

 

             Antibody Scrn (test Negative (3/28/22 2:52                         

  



             code = Antibody Scrn) AM)                                    



[facility] [employer] [id number] 07:52:00





             Test Item    Value        Reference Range Interpretation Comments

 

             Glucose Lvl (test code = Glucose Lvl) 291          70-99           

          



[facility] [employer] [id number] 07:52:00





             Test Item    Value        Reference Range Interpretation Comments

 

             BUN (test code = BUN) 16           7-22                      



[facility] [employer] [id number] 07:52:00





             Test Item    Value        Reference Range Interpretation Comments

 

             Creatinine Lvl (test code = Creatinine 0.83         0.50-1.40      

           



             Lvl)                                                



[facility] [employer] [id number] 07:52:00





             Test Item    Value        Reference Range Interpretation Comments

 

             Sodium Lvl (test code = Sodium Lvl) 138          135-145           

        



[facility] [employer] [id number] 07:52:00





             Test Item    Value        Reference Range Interpretation Comments

 

             Potassium Lvl (test code = Potassium 4.7          3.5-5.1          

         



             Lvl)                                                



[facility] [employer] [id number] 07:52:00





             Test Item    Value        Reference Range Interpretation Comments

 

             Chloride Lvl (test code = Chloride Lvl) 113                  

            



[facility] [employer] [id number] 07:52:00





             Test Item    Value        Reference Range Interpretation Comments

 

             CO2 (test code = CO2) 18           24-32                     



[facility]2022-03-28 07:52:00





             Test Item    Value        Reference Range Interpretation Comments

 

             AGAP (test code = AGAP) 11.7         10.0-20.0                 



[facility]2022-03-28 07:52:00





             Test Item    Value        Reference Range Interpretation Comments

 

             Calcium Lvl (test code = Calcium Lvl) 9.4          8.5-10.5        

          



[facility]2022-03-28 07:52:00





             Test Item    Value        Reference Range Interpretation Comments

 

             eGFR (test code = eGFR) 113                                    



[id number]





             Test Item    Value        Reference Range Interpretation Comments

 

             WBC (test code = WBC) 5.5          3.7-10.4                  



[place]402-03-28 07:52:00





             Test Item    Value        Reference Range Interpretation Comments

 

             RBC (test code = RBC) 5.53         4.70-6.10                 



[place] 236402-03-28 07:52:00





             Test Item    Value        Reference Range Interpretation Comments

 

             Hgb (test code = Hgb) 16.3         14.0-18.0                 



[id number] 07:52:00





             Test Item    Value        Reference Range Interpretation Comments

 

             Hct (test code = Hct) 50.5         42.0-54.0                 



[id number] 07:52:00





             Test Item    Value        Reference Range Interpretation Comments

 

             MCV (test code = MCV) 91.3         80.0-94.0                 



[place] 236402-03-28 07:52:00





             Test Item    Value        Reference Range Interpretation Comments

 

             MCH (test code = MCH) 29.5 pg      27.0-31.0                 



[place] 236402-03-28 07:52:00





             Test Item    Value        Reference Range Interpretation Comments

 

             MCHC (test code = MCHC) 32.3         32.0-36.0                 



[place] 236402-03-28 07:52:00





             Test Item    Value        Reference Range Interpretation Comments

 

             RDW (test code = RDW) 15.4         11.5-14.5                 



[id number] 07:52:00





             Test Item    Value        Reference Range Interpretation Comments

 

             Platelet (test code = Platelet) 210          133-450               

    



[place]402-03-28 07:52:00





             Test Item    Value        Reference Range Interpretation Comments

 

             MPV (test code = MPV) 9.3          7.4-10.4                  



[facility]2022-03-27 10:12:00





             Test Item    Value        Reference Range Interpretation Comments

 

             Glucose Lvl (test code = Glucose Lvl) 115          70-99           

          



[place] 334942-03-27 10:12:00





             Test Item    Value        Reference Range Interpretation Comments

 

             BUN (test code = BUN) 18           7-22                      



[place] 334942-03-27 10:12:00





             Test Item    Value        Reference Range Interpretation Comments

 

             Creatinine Lvl (test code = Creatinine 0.78         0.50-1.40      

           



             Lvl)                                                



[place] 334942-03-27 10:12:00





             Test Item    Value        Reference Range Interpretation Comments

 

             Sodium Lvl (test code = Sodium Lvl) 138          135-145           

        



[place] 334942-03-27 10:12:00





             Test Item    Value        Reference Range Interpretation Comments

 

             Potassium Lvl (test code = Potassium 4.6          3.5-5.1          

         



             Lvl)                                                



[place] 334942-03-27 10:12:00





             Test Item    Value        Reference Range Interpretation Comments

 

             Chloride Lvl (test code = Chloride Lvl) 111                  

            



[place] 334942-03-27 10:12:00





             Test Item    Value        Reference Range Interpretation Comments

 

             CO2 (test code = CO2) 21           24-32                     



[place] 334942-03-27 10:12:00





             Test Item    Value        Reference Range Interpretation Comments

 

             AGAP (test code = AGAP) 10.6         10.0-20.0                 



[place] 334942-03-27 10:12:00





             Test Item    Value        Reference Range Interpretation Comments

 

             Calcium Lvl (test code = Calcium Lvl) 8.6          8.5-10.5        

          



[facility]2022-03-27 10:12:00





             Test Item    Value        Reference Range Interpretation Comments

 

             eGFR (test code = eGFR) 116                                    



[place] 236402-03-27 10:12:00





             Test Item    Value        Reference Range Interpretation Comments

 

             WBC (test code = WBC) 8.2          3.7-10.4                  



[place] 236402-03-27 10:12:00





             Test Item    Value        Reference Range Interpretation Comments

 

             RBC (test code = RBC) 5.14         4.70-6.10                 



[place] 236402-03-27 10:12:00





             Test Item    Value        Reference Range Interpretation Comments

 

             Hgb (test code = Hgb) 15.5         14.0-18.0                 



[place] [address]-03-27 10:12:00





             Test Item    Value        Reference Range Interpretation Comments

 

             Hct (test code = Hct) 46.0         42.0-54.0                 



[place] [address]-03-27 10:12:00





             Test Item    Value        Reference Range Interpretation Comments

 

             MCV (test code = MCV) 89.5         80.0-94.0                 



[place] [address]-03-27 10:12:00





             Test Item    Value        Reference Range Interpretation Comments

 

             MCH (test code = MCH) 30.2 pg      27.0-31.0                 



[place] 236402-03-27 10:12:00





             Test Item    Value        Reference Range Interpretation Comments

 

             MCHC (test code = MCHC) 33.8         32.0-36.0                 



[place] [address]-03-27 10:12:00





             Test Item    Value        Reference Range Interpretation Comments

 

             RDW (test code = RDW) 15.3         11.5-14.5                 



[place] [address]-03-27 10:12:00





             Test Item    Value        Reference Range Interpretation Comments

 

             Platelet (test code = Platelet) 358          133-450               

    



[place] [address]-03-27 10:12:00





             Test Item    Value        Reference Range Interpretation Comments

 

             MPV (test code = MPV) 8.3          7.4-10.4                  



[facility]2022-03-27 10:12:00





             Test Item    Value        Reference Range Interpretation Comments

 

             Glucose Lvl (test code = Glucose Lvl) 115          70-99           

          



[facility]2022-03-27 10:12:00





             Test Item    Value        Reference Range Interpretation Comments

 

             BUN (test code = BUN) 18           7-22                      



[place] 334942-03-27 10:12:00





             Test Item    Value        Reference Range Interpretation Comments

 

             Creatinine Lvl (test code = Creatinine 0.78         0.50-1.40      

           



             Lvl)                                                



[place] 334942-03-27 10:12:00





             Test Item    Value        Reference Range Interpretation Comments

 

             Sodium Lvl (test code = Sodium Lvl) 138          135-145           

        



[place] 334942-03-27 10:12:00





             Test Item    Value        Reference Range Interpretation Comments

 

             Potassium Lvl (test code = Potassium 4.6          3.5-5.1          

         



             Lvl)                                                



[place] 334942-03-27 10:12:00





             Test Item    Value        Reference Range Interpretation Comments

 

             Chloride Lvl (test code = Chloride Lvl) 111                  

            



[place] 334942-03-27 10:12:00





             Test Item    Value        Reference Range Interpretation Comments

 

             CO2 (test code = CO2) 21           24-32                     



[place] 334942-03-27 10:12:00





             Test Item    Value        Reference Range Interpretation Comments

 

             AGAP (test code = AGAP) 10.6         10.0-20.0                 



[place] 334942-03-27 10:12:00





             Test Item    Value        Reference Range Interpretation Comments

 

             Calcium Lvl (test code = Calcium Lvl) 8.6          8.5-10.5        

          



[place] 334942-03-27 10:12:00





             Test Item    Value        Reference Range Interpretation Comments

 

             eGFR (test code = eGFR) 116                                    



[place] 236402-03-27 10:12:00





             Test Item    Value        Reference Range Interpretation Comments

 

             WBC (test code = WBC) 8.2          3.7-10.4                  



[place] 236402-03-27 10:12:00





             Test Item    Value        Reference Range Interpretation Comments

 

             RBC (test code = RBC) 5.14         4.70-6.10                 



[place] 236402-03-27 10:12:00





             Test Item    Value        Reference Range Interpretation Comments

 

             Hgb (test code = Hgb) 15.5         14.0-18.0                 



[place] 236402-03-27 10:12:00





             Test Item    Value        Reference Range Interpretation Comments

 

             Hct (test code = Hct) 46.0         42.0-54.0                 



[place] [address]-03-27 10:12:00





             Test Item    Value        Reference Range Interpretation Comments

 

             MCV (test code = MCV) 89.5         80.0-94.0                 



[place] [address]-03-27 10:12:00





             Test Item    Value        Reference Range Interpretation Comments

 

             MCH (test code = MCH) 30.2 pg      27.0-31.0                 



[place] 236402-03-27 10:12:00





             Test Item    Value        Reference Range Interpretation Comments

 

             MCHC (test code = MCHC) 33.8         32.0-36.0                 



[address]03-27 10:12:00





             Test Item    Value        Reference Range Interpretation Comments

 

             RDW (test code = RDW) 15.3         11.5-14.5                 



[place] 236402-03-27 10:12:00





             Test Item    Value        Reference Range Interpretation Comments

 

             Platelet (test code = Platelet) 358          133-450               

    



[place] 236402-03-27 10:12:00





             Test Item    Value        Reference Range Interpretation Comments

 

             MPV (test code = MPV) 8.3          7.4-10.4                  



[facility]2022-03-27 10:12:00





             Test Item    Value        Reference Range Interpretation Comments

 

             Glucose Lvl (test code = Glucose Lvl) 115          70-99           

          



[place] 334942-03-27 10:12:00





             Test Item    Value        Reference Range Interpretation Comments

 

             BUN (test code = BUN) 18           7-22                      



[place] 334942-03-27 10:12:00





             Test Item    Value        Reference Range Interpretation Comments

 

             Creatinine Lvl (test code = Creatinine 0.78         0.50-1.40      

           



             Lvl)                                                



[place] 334942-03-27 10:12:00





             Test Item    Value        Reference Range Interpretation Comments

 

             Sodium Lvl (test code = Sodium Lvl) 138          135-145           

        



[place] 334942-03-27 10:12:00





             Test Item    Value        Reference Range Interpretation Comments

 

             Potassium Lvl (test code = Potassium 4.6          3.5-5.1          

         



             Lvl)                                                



[place] 334942-03-27 10:12:00





             Test Item    Value        Reference Range Interpretation Comments

 

             Chloride Lvl (test code = Chloride Lvl) 111                  

            



[place] 334942-03-27 10:12:00





             Test Item    Value        Reference Range Interpretation Comments

 

             CO2 (test code = CO2) 21           24-32                     



[place] 334942-03-27 10:12:00





             Test Item    Value        Reference Range Interpretation Comments

 

             AGAP (test code = AGAP) 10.6         10.0-20.0                 



[place] 334942-03-27 10:12:00





             Test Item    Value        Reference Range Interpretation Comments

 

             Calcium Lvl (test code = Calcium Lvl) 8.6          8.5-10.5        

          



[place] 334942-03-27 10:12:00





             Test Item    Value        Reference Range Interpretation Comments

 

             eGFR (test code = eGFR) 116                                    



[place] 236402-03-27 10:12:00





             Test Item    Value        Reference Range Interpretation Comments

 

             WBC (test code = WBC) 8.2          3.7-10.4                  



[place] 236402-03-27 10:12:00





             Test Item    Value        Reference Range Interpretation Comments

 

             RBC (test code = RBC) 5.14         4.70-6.10                 



[place] [address]-03-27 10:12:00





             Test Item    Value        Reference Range Interpretation Comments

 

             Hgb (test code = Hgb) 15.5         14.0-18.0                 



[place] [address]-03-27 10:12:00





             Test Item    Value        Reference Range Interpretation Comments

 

             Hct (test code = Hct) 46.0         42.0-54.0                 



[place] [address]-03-27 10:12:00





             Test Item    Value        Reference Range Interpretation Comments

 

             MCV (test code = MCV) 89.5         80.0-94.0                 



[place] [address]-03-27 10:12:00





             Test Item    Value        Reference Range Interpretation Comments

 

             MCH (test code = MCH) 30.2 pg      27.0-31.0                 



[place] [address]-03-27 10:12:00





             Test Item    Value        Reference Range Interpretation Comments

 

             MCHC (test code = MCHC) 33.8         32.0-36.0                 



[place] [address]-03-27 10:12:00





             Test Item    Value        Reference Range Interpretation Comments

 

             RDW (test code = RDW) 15.3         11.5-14.5                 



[place] 236402-03-27 10:12:00





             Test Item    Value        Reference Range Interpretation Comments

 

             Platelet (test code = Platelet) 358          133-450               

    



[place] 236402-03-27 10:12:00





             Test Item    Value        Reference Range Interpretation Comments

 

             MPV (test code = MPV) 8.3          7.4-10.4                  



[place] 334942-03-27 06:53:00





             Test Item    Value        Reference Range Interpretation Comments

 

             Glucose Lvl (test code = Glucose Lvl) 167          70-99           

          



[place] 334942-03-27 06:53:00





             Test Item    Value        Reference Range Interpretation Comments

 

             BUN (test code = BUN) 16           7-22                      



[place] 334942-03-27 06:53:00





             Test Item    Value        Reference Range Interpretation Comments

 

             Creatinine Lvl (test code = Creatinine 0.99         0.50-1.40      

           



             Lvl)                                                



[place] 334942-03-27 06:53:00





             Test Item    Value        Reference Range Interpretation Comments

 

             Sodium Lvl (test code = Sodium Lvl) 141          135-145           

        



[place] 334942-03-27 06:53:00





             Test Item    Value        Reference Range Interpretation Comments

 

             Potassium Lvl (test code = Potassium 4.5          3.5-5.1          

         



             Lvl)                                                



[place] 334942-03-27 06:53:00





             Test Item    Value        Reference Range Interpretation Comments

 

             Chloride Lvl (test code = Chloride Lvl) 111                  

            



[place] 334942-03-27 06:53:00





             Test Item    Value        Reference Range Interpretation Comments

 

             CO2 (test code = CO2) 22           24-32                     



[place] 334942-03-27 06:53:00





             Test Item    Value        Reference Range Interpretation Comments

 

             AGAP (test code = AGAP) 12.5         10.0-20.0                 



[place] 334942-03-27 06:53:00





             Test Item    Value        Reference Range Interpretation Comments

 

             Calcium Lvl (test code = Calcium Lvl) 8.6          8.5-10.5        

          



[place] 334942-03-27 06:53:00





             Test Item    Value        Reference Range Interpretation Comments

 

             eGFR (test code = eGFR) 98                                     



[place] 334942-03-27 06:53:00





             Test Item    Value        Reference Range Interpretation Comments

 

             Glucose Lvl (test code = Glucose Lvl) 167          70-99           

          



[place] 334942-03-27 06:53:00





             Test Item    Value        Reference Range Interpretation Comments

 

             BUN (test code = BUN) 16           7-22                      



[place] 334942-03-27 06:53:00





             Test Item    Value        Reference Range Interpretation Comments

 

             Creatinine Lvl (test code = Creatinine 0.99         0.50-1.40      

           



             Lvl)                                                



[place] 334942-03-27 06:53:00





             Test Item    Value        Reference Range Interpretation Comments

 

             Sodium Lvl (test code = Sodium Lvl) 141          135-145           

        



[place] 334942-03-27 06:53:00





             Test Item    Value        Reference Range Interpretation Comments

 

             Potassium Lvl (test code = Potassium 4.5          3.5-5.1          

         



             Lvl)                                                



[place] 334942-03-27 06:53:00





             Test Item    Value        Reference Range Interpretation Comments

 

             Chloride Lvl (test code = Chloride Lvl) 111                  

            



[place] 334942-03-27 06:53:00





             Test Item    Value        Reference Range Interpretation Comments

 

             CO2 (test code = CO2)                      



[place] 334942-03-27 06:53:00





             Test Item    Value        Reference Range Interpretation Comments

 

             AGAP (test code = AGAP) 12.5         10.0-20.0                 



[place] 334942-03-27 06:53:00





             Test Item    Value        Reference Range Interpretation Comments

 

             Calcium Lvl (test code = Calcium Lvl) 8.6          8.5-10.5        

          



[place] 334942-03-27 06:53:00





             Test Item    Value        Reference Range Interpretation Comments

 

             eGFR (test code = eGFR) 98                                     



[place] 334942-03-27 06:53:00





             Test Item    Value        Reference Range Interpretation Comments

 

             Glucose Lvl (test code = Glucose Lvl) 167          70-99           

          



[place] 334942-03-27 06:53:00





             Test Item    Value        Reference Range Interpretation Comments

 

             BUN (test code = BUN) 16                                 



[place] 334942-03-27 06:53:00





             Test Item    Value        Reference Range Interpretation Comments

 

             Creatinine Lvl (test code = Creatinine 0.99         0.50-1.40      

           



             Lvl)                                                



[facility]2022-03-27 06:53:00





             Test Item    Value        Reference Range Interpretation Comments

 

             Sodium Lvl (test code = Sodium Lvl) 141          135-145           

        



[place] 334942-03-27 06:53:00





             Test Item    Value        Reference Range Interpretation Comments

 

             Potassium Lvl (test code = Potassium 4.5          3.5-5.1          

         



             Lvl)                                                



[place] 334942-03-27 06:53:00





             Test Item    Value        Reference Range Interpretation Comments

 

             Chloride Lvl (test code = Chloride Lvl) 111                  

            



[place] 334942-03-27 06:53:00





             Test Item    Value        Reference Range Interpretation Comments

 

             CO2 (test code = CO2)                      



[place] 334942-03-27 06:53:00





             Test Item    Value        Reference Range Interpretation Comments

 

             AGAP (test code = AGAP) 12.5         10.0-20.0                 



[place] 334942-03-27 06:53:00





             Test Item    Value        Reference Range Interpretation Comments

 

             Calcium Lvl (test code = Calcium Lvl) 8.6          8.5-10.5        

          



[id number]





             Test Item    Value        Reference Range Interpretation Comments

 

             eGFR (test code = eGFR) 98                                     



[facility]:ISOLATE:ORDQN:[id number] 22:59:00





             Test Item    Value        Reference Range Interpretation Comments

 

             Culture: Urine (test >100,000 CFU/mL Proteus                       

    



             code = Culture: mirabilis >100,000 CFU/mL                          

 



             Urine)       Providencia stuartii                           



[facility]PT:ISOLATE:ORDQN:[id number] 22:59:00





             Test Item    Value        Reference Range Interpretation Comments

 

             Providencia stuartii Providencia stuartii                          

 



             (test code = Providencia                                        



             stuartii)                                           



[facility]SUSC:PT:ISOLATE:ORDQN:[id number] 22:59:00





             Test Item    Value        Reference Range Interpretation Comments

 

             Proteus mirabilis (test Proteus mirabilis                          

 



             code = Proteus mirabilis)                                        



[facility]: [id number] 22:59:00





             Test Item    Value        Reference Range Interpretation Comments

 

             Culture: Urine (test Holding For Better                           



             code = Culture: Urine) Growth                                 



[facility]IT[facility]IN[facility]SUSC:PT:ISOLATE:ORDQN:[id number] 22:59:00





             Test Item    Value        Reference Range Interpretation Comments

 

             Culture: Urine (test >100,000 CFU/mL Proteus                       

    



             code = Culture: mirabilis >100,000 CFU/mL                          

 



             Urine)       Providencia stuartii                           



[facility]USC:PT:ISOLATE:ORDQN:[id number] 22:59:00





             Test Item    Value        Reference Range Interpretation Comments

 

             Providencia stuartii Providencia stuartii                          

 



             (test code = Providencia                                        



             stuartii)                                           



[facility]annNIT[facility]ANT[facility]IN:SUSC:PT:ISOLATE:ORDQN:[id number] 22:59:00





             Test Item    Value        Reference Range Interpretation Comments

 

             Proteus mirabilis (test Proteus mirabilis                          

 



             code = Proteus mirabilis)                                        



[facility] [id number] 22:59:00





             Test Item    Value        Reference Range Interpretation Comments

 

             Culture: Urine (test Holding For Better                           



             code = Culture: Urine) Growth                                 



[facility]USC:PT:ISOLATE:ORDQN:[id number] 22:59:00





             Test Item    Value        Reference Range Interpretation Comments

 

             Culture: Urine (test >100,000 CFU/mL Proteus                       

    



             code = Culture: mirabilis >100,000 CFU/mL                          

 



             Urine)       Providencia stuartii                           



[facility]USC:PT:ISOLATE:ORDQN:[id number] 22:59:00





             Test Item    Value        Reference Range Interpretation Comments

 

             Providencia stuartii Providencia stuartii                          

 



             (test code = Providencia                                        



             stuartii)                                           



[facility]USC:PT:ISOLATE:ORDQN:[id number] 22:59:00





             Test Item    Value        Reference Range Interpretation Comments

 

             Proteus mirabilis (test Proteus mirabilis                          

 



             code = Proteus mirabilis)                                        



[facility] [id number] 22:59:00





             Test Item    Value        Reference Range Interpretation Comments

 

             Culture: Urine (test Holding For Better                           



             code = Culture: Urine) Growth                                 



[facility] [id number] 21:03:00





             Test Item    Value        Reference Range Interpretation Comments

 

             Glucose Lvl (test code = Glucose Lvl) 126          70-99           

          



[facility] [id number] 21:03:00





             Test Item    Value        Reference Range Interpretation Comments

 

             BUN (test code = BUN) 16           7-22                      



[facility] [id number] 21:03:00





             Test Item    Value        Reference Range Interpretation Comments

 

             Creatinine Lvl (test code = Creatinine 0.76         0.50-1.40      

           



             Lvl)                                                



[facility] [id number] 21:03:00





             Test Item    Value        Reference Range Interpretation Comments

 

             Sodium Lvl (test code = Sodium Lvl) 140          135-145           

        



[facility] [id number] 21:03:00





             Test Item    Value        Reference Range Interpretation Comments

 

             Potassium Lvl (test code = Potassium 3.3          3.5-5.1          

         



             Lvl)                                                



[facility] [place] [id number] 21:03:00





             Test Item    Value        Reference Range Interpretation Comments

 

             Chloride Lvl (test code = Chloride Lvl) 111                  

            



[place] 334942-03-26 21:03:00





             Test Item    Value        Reference Range Interpretation Comments

 

             CO2 (test code = CO2) 22           24-32                     



[place] [address]-03-26 21:03:00





             Test Item    Value        Reference Range Interpretation Comments

 

             Calcium Lvl (test code = Calcium Lvl) 8.5          8.5-10.5        

          



[place] 334942-03-26 21:03:00





             Test Item    Value        Reference Range Interpretation Comments

 

             AGAP (test code = AGAP) 10.3         10.0-20.0                 



[place] [address]-03-26 21:03:00





             Test Item    Value        Reference Range Interpretation Comments

 

             eGFR (test code = eGFR) 117                                    



[place] 334942-03-26 21:03:00





             Test Item    Value        Reference Range Interpretation Comments

 

             Lactic Acid Lvl (test code = Lactic 1.1          0.5-2.2           

        



             Acid Lvl)                                           



[place] [address]-03-26 21:03:00





             Test Item    Value        Reference Range Interpretation Comments

 

             Segs (test code = Segs) 68.9         45.0-75.0                 



[place] [address]-03-26 21:03:00





             Test Item    Value        Reference Range Interpretation Comments

 

             Lymphocytes (test code = Lymphocytes) 20.4         20.0-40.0       

          



[place] [address]-03-26 21:03:00





             Test Item    Value        Reference Range Interpretation Comments

 

             Monocytes (test code = Monocytes) 8.2          2.0-12.0            

      



[place] [address]-03-26 21:03:00





             Test Item    Value        Reference Range Interpretation Comments

 

             Eosinophils (test code = 2.2          See_Comment                [A

utomated message] The



             Eosinophils)                                        system which ge

nerated



                                                                 this result tra

nsmitted



                                                                 reference range

: <=4.0.



                                                                 The reference r

zhen was



                                                                 not used to int

erpret



                                                                 this result as



                                                                 normal/abnormal

.



[place] [address]-03-26 21:03:00





             Test Item    Value        Reference Range Interpretation Comments

 

             Basophils (test code = 0.3          See_Comment                [Aut

omated message] The



             Basophils)                                          system which ge

nerated



                                                                 this result tra

nsmitted



                                                                 reference range

: <=1.0.



                                                                 The reference r

zhen was



                                                                 not used to int

erpret



                                                                 this result as



                                                                 normal/abnormal

.



[place] 236402-03-26 21:03:00





             Test Item    Value        Reference Range Interpretation Comments

 

             Neutrophils # (test code = Neutrophils 5.5          1.5-8.1        

           



             #)                                                  



[place] 236402-03-26 21:03:00





             Test Item    Value        Reference Range Interpretation Comments

 

             Lymphocytes # (test code = Lymphocytes 1.6          1.0-5.5        

           



             #)                                                  



[place] 236402-03-26 21:03:00





             Test Item    Value        Reference Range Interpretation Comments

 

             Monocytes # (test code 0.7          See_Comment                [Aut

omated message] The



             = Monocytes #)                                        system which 

generated



                                                                 this result tra

nsmitted



                                                                 reference range

: <=0.8.



                                                                 The reference r

zhen was



                                                                 not used to int

erpret



                                                                 this result as



                                                                 normal/abnormal

.



[place] 236402-03-26 21:03:00





             Test Item    Value        Reference Range Interpretation Comments

 

             Eosinophils # (test code 0.2          See_Comment                [A

utomated message] The



             = Eosinophils #)                                        system whic

h generated



                                                                 this result tra

nsmitted



                                                                 reference range

: <=0.5.



                                                                 The reference r

zhen was



                                                                 not used to int

erpret



                                                                 this result as



                                                                 normal/abnormal

.



[place] 236402-03-26 21:03:00





             Test Item    Value        Reference Range Interpretation Comments

 

             WBC (test code = WBC) 8.0          3.7-10.4                  



[place] [address]-03-26 21:03:00





             Test Item    Value        Reference Range Interpretation Comments

 

             RBC (test code = RBC) 5.37         4.70-6.10                 



[place] [address]-03-26 21:03:00





             Test Item    Value        Reference Range Interpretation Comments

 

             Hgb (test code = Hgb) 15.9         14.0-18.0                 



[place] [address]-03-26 21:03:00





             Test Item    Value        Reference Range Interpretation Comments

 

             Hct (test code = Hct) 47.3         42.0-54.0                 



[place] [address]-03-26 21:03:00





             Test Item    Value        Reference Range Interpretation Comments

 

             MCV (test code = MCV) 88.1         80.0-94.0                 



[place] [address]-03-26 21:03:00





             Test Item    Value        Reference Range Interpretation Comments

 

             MCH (test code = MCH) 29.6 pg      27.0-31.0                 



[id number]03:00





             Test Item    Value        Reference Range Interpretation Comments

 

             MCHC (test code = MCHC) 33.6         32.0-36.0                 



[id number] 21:03:00





             Test Item    Value        Reference Range Interpretation Comments

 

             RDW (test code = RDW) 15.3         11.5-14.5                 



[facility] Steph[id number] 21:03:00





             Test Item    Value        Reference Range Interpretation Comments

 

             Platelet (test code = Platelet) 370          133-450               

    



[facility] Step[id number] 21:03:00





             Test Item    Value        Reference Range Interpretation Comments

 

             MPV (test code = MPV) 8.1          7.4-10.4                  



[facility] AND [id number] 21:03:00





             Test Item    Value        Reference Range Interpretation Comments

 

             UA Comment 1 (test Suboptimal specimen                           



             code = UA Comment 1) received. Results may be                      

     



                          inaccurate due to the                           



                          age of the specimen.                           



                          Interpret results with                           



                          caution.                               



[facility] AND [id number] 21:03:00





             Test Item    Value        Reference Range Interpretation Comments

 

             UA Color (test code = Yellow *NA*(3/26/22                          

 



             UA Color)    4:03 PM)                               



[facility] AND [id number] 21:03:00





             Test Item    Value        Reference Range Interpretation Comments

 

             UA Turbidity (test code Slight *ABN*(3/26/22                       

    



             = UA Turbidity) 4:03 PM)                               



[facility] AND [id number] 21:03:00





             Test Item    Value        Reference Range Interpretation Comments

 

             UA Spec Grav (test code = UA Spec 1.015 1                          

      



             Grav)                                               



[facility] AND [id number] 21:03:00





             Test Item    Value        Reference Range Interpretation Comments

 

             UA pH (test code = UA pH) 5.0 1        5.0-8.0                   



[facility] AND [id number] 21:03:00





             Test Item    Value        Reference Range Interpretation Comments

 

             UA Protein (test code = UA Negative mg/dL                          

 



             Protein)                                            



[facility] AND [id number] 21:03:00





             Test Item    Value        Reference Range Interpretation Comments

 

             UA Glucose (test code = UA Negative mg/dL                          

 



             Glucose)                                            



[facility] AND [id number] 21:03:00





             Test Item    Value        Reference Range Interpretation Comments

 

             UA Ketones (test code = UA Negative mg/dL                          

 



             Ketones)                                            



[facility] [id number] 21:03:00





             Test Item    Value        Reference Range Interpretation Comments

 

             UA Bili (test code = Negative *NA*(3/26/22                         

  



             UA Bili)     4:03 PM)                               



[facility] [id number] 21:03:00





             Test Item    Value        Reference Range Interpretation Comments

 

             UA Blood (test code = Negative (3/26/22 4:03                       

    



             UA Blood)    PM)                                    



[facility] [id number] 21:03:00





             Test Item    Value        Reference Range Interpretation Comments

 

             UA Urobilinogen (test code = UA no gt        0.1-1.0               

    



             Urobilinogen)                                        



[facility] [id number] 21:03:00





             Test Item    Value        Reference Range Interpretation Comments

 

             UA Nitrite (test code Negative (3/26/22 4:03                       

    



             = UA Nitrite) PM)                                    



[facility] [id number] 21:03:00





             Test Item    Value        Reference Range Interpretation Comments

 

             UA Leuk Est (test code Large *ABN*(3/26/22                         

  



             = UA Leuk Est) 4:03 PM)                               



[facility] [id number] 21:03:00





             Test Item    Value        Reference Range Interpretation Comments

 

             UA WBC (test code = 64           See_Comment                [Automa

huma message] The



             UA WBC)                                             system which ge

nerated this



                                                                 result transmit

huma



                                                                 reference range

: <=5. The



                                                                 reference range

 was not



                                                                 used to interpr

et this



                                                                 result as zayda

l/abnormal.



[facility] [id number] 21:03:00





             Test Item    Value        Reference Range Interpretation Comments

 

             UA RBC (test code = 2            See_Comment                [Automa

huma message] The



             UA RBC)                                             system which ge

nerated this



                                                                 result transmit

huma



                                                                 reference range

: <=2. The



                                                                 reference range

 was not



                                                                 used to interpr

et this



                                                                 result as zayda

l/abnormal.



[facility] [id number] 21:03:00





             Test Item    Value        Reference Range Interpretation Comments

 

             UA Mucus (test code = UA Mucus) Few /LPF                           

    



[facility] [id number] 21:03:00





             Test Item    Value        Reference Range Interpretation Comments

 

             UA Sq Epi (test code = UA Sq Epi) None Seen                        

      



[id number]





             Test Item    Value        Reference Range Interpretation Comments

 

             Glucose Lvl (test code = Glucose Lvl) 126          70-99           

          



[place] 334942-03-26 21:03:00





             Test Item    Value        Reference Range Interpretation Comments

 

             BUN (test code = BUN) 16           7-22                      



[place] 334942-03-26 21:03:00





             Test Item    Value        Reference Range Interpretation Comments

 

             Creatinine Lvl (test code = Creatinine 0.76         0.50-1.40      

           



             Lvl)                                                



[facility]2022-03-26 21:03:00





             Test Item    Value        Reference Range Interpretation Comments

 

             Sodium Lvl (test code = Sodium Lvl) 140          135-145           

        



[place] 334942-03-26 21:03:00





             Test Item    Value        Reference Range Interpretation Comments

 

             Potassium Lvl (test code = Potassium 3.3          3.5-5.1          

         



             Lvl)                                                



[facility]2022-03-26 21:03:00





             Test Item    Value        Reference Range Interpretation Comments

 

             Chloride Lvl (test code = Chloride Lvl) 111                  

            



[facility]2022-03-26 21:03:00





             Test Item    Value        Reference Range Interpretation Comments

 

             CO2 (test code = CO2) 22           24-32                     



[place] 334942-03-26 21:03:00





             Test Item    Value        Reference Range Interpretation Comments

 

             Calcium Lvl (test code = Calcium Lvl) 8.5          8.5-10.5        

          



[facility]2022-03-26 21:03:00





             Test Item    Value        Reference Range Interpretation Comments

 

             AGAP (test code = AGAP) 10.3         10.0-20.0                 



[facility]2022-03-26 21:03:00





             Test Item    Value        Reference Range Interpretation Comments

 

             eGFR (test code = eGFR) 117                                    



[facility]2022-03-26 21:03:00





             Test Item    Value        Reference Range Interpretation Comments

 

             Lactic Acid Lvl (test code = Lactic 1.1          0.5-2.2           

        



             Acid Lvl)                                           



[id number] 21:03:00





             Test Item    Value        Reference Range Interpretation Comments

 

             Segs (test code = Segs) 68.9         45.0-75.0                 



[id number] 21:03:00





             Test Item    Value        Reference Range Interpretation Comments

 

             Lymphocytes (test code = Lymphocytes) 20.4         20.0-40.0       

          



[place] 236402-03-26 21:03:00





             Test Item    Value        Reference Range Interpretation Comments

 

             Monocytes (test code = Monocytes) 8.2          2.0-12.0            

      



[place] 236402-03-26 21:03:00





             Test Item    Value        Reference Range Interpretation Comments

 

             Eosinophils (test code = 2.2          See_Comment                [A

utomated message] The



             Eosinophils)                                        system which ge

nerated



                                                                 this result tra

nsmitted



                                                                 reference range

: <=4.0.



                                                                 The reference r

zhen was



                                                                 not used to int

erpret



                                                                 this result as



                                                                 normal/abnormal

.



[place] 236402-03-26 21:03:00





             Test Item    Value        Reference Range Interpretation Comments

 

             Basophils (test code = 0.3          See_Comment                [Aut

omated message] The



             Basophils)                                          system which ge

nerated



                                                                 this result tra

nsmitted



                                                                 reference range

: <=1.0.



                                                                 The reference r

zhen was



                                                                 not used to int

erpret



                                                                 this result as



                                                                 normal/abnormal

.



[place]402-03-26 21:03:00





             Test Item    Value        Reference Range Interpretation Comments

 

             Neutrophils # (test code = Neutrophils 5.5          1.5-8.1        

           



             #)                                                  



[place] 236402-03-26 21:03:00





             Test Item    Value        Reference Range Interpretation Comments

 

             Lymphocytes # (test code = Lymphocytes 1.6          1.0-5.5        

           



             #)                                                  



[place] 236402-03-26 21:03:00





             Test Item    Value        Reference Range Interpretation Comments

 

             Monocytes # (test code 0.7          See_Comment                [Aut

omated message] The



             = Monocytes #)                                        system which 

generated



                                                                 this result tra

nsmitted



                                                                 reference range

: <=0.8.



                                                                 The reference r

zhen was



                                                                 not used to int

erpret



                                                                 this result as



                                                                 normal/abnormal

.



[place] 236402-03-26 21:03:00





             Test Item    Value        Reference Range Interpretation Comments

 

             Eosinophils # (test code 0.2          See_Comment                [A

utomated message] The



             = Eosinophils #)                                        system whic

h generated



                                                                 this result tra

nsmitted



                                                                 reference range

: <=0.5.



                                                                 The reference r

zhen was



                                                                 not used to int

erpret



                                                                 this result as



                                                                 normal/abnormal

.



[place] 236402-03-26 21:03:00





             Test Item    Value        Reference Range Interpretation Comments

 

             WBC (test code = WBC) 8.0          3.7-10.4                  



[place] 236402-03-26 21:03:00





             Test Item    Value        Reference Range Interpretation Comments

 

             RBC (test code = RBC) 5.37         4.70-6.10                 



[id number]





             Test Item    Value        Reference Range Interpretation Comments

 

             Hgb (test code = Hgb) 15.9         14.0-18.0                 



[facility]villeSxdihywJMPRFPDXCB3154-79-18 21:03:00





             Test Item    Value        Reference Range Interpretation Comments

 

             Hct (test code = Hct) 47.3         42.0-54.0                 



[facility]villeVabtkrlXJNRQXSPFL5746-65-90 21:03:00





             Test Item    Value        Reference Range Interpretation Comments

 

             MCV (test code = MCV) 88.1         80.0-94.0                 



[id number] 21:03:00





             Test Item    Value        Reference Range Interpretation Comments

 

             MCH (test code = MCH) 29.6 pg      27.0-31.0                 



[facility]villeZcbikhoQCKQHJMTDW2280-02-23 21:03:00





             Test Item    Value        Reference Range Interpretation Comments

 

             MCHC (test code = MCHC) 33.6         32.0-36.0                 



[facility]villeLncahswRNDNHPRBEY0828-60-60 21:03:00





             Test Item    Value        Reference Range Interpretation Comments

 

             RDW (test code = RDW) 15.3         11.5-14.5                 



[facility]villeBnkdyqlHMVQSWGVZF7971-71-27 21:03:00





             Test Item    Value        Reference Range Interpretation Comments

 

             Platelet (test code = Platelet) 370          133-450               

    



[facility]villeEeidklmMJFRRITPXP9908-39-62 21:03:00





             Test Item    Value        Reference Range Interpretation Comments

 

             MPV (test code = MPV) 8.1          7.4-10.4                  



[facility]2022-03-26 21:03:00





             Test Item    Value        Reference Range Interpretation Comments

 

             UA Comment 1 (test Suboptimal specimen                           



             code = UA Comment 1) received. Results may be                      

     



                          inaccurate due to the                           



                          age of the specimen.                           



                          Interpret results with                           



                          caution.                               



[facility]2022-03-26 21:03:00





             Test Item    Value        Reference Range Interpretation Comments

 

             UA Color (test code = Yellow *NA*(3/26/22                          

 



             UA Color)    4:03 PM)                               



[facility]2022-03-26 21:03:00





             Test Item    Value        Reference Range Interpretation Comments

 

             UA Turbidity (test code Slight *ABN*(3/26/22                       

    



             = UA Turbidity) 4:03 PM)                               



[facility] AND [id number] 21:03:00





             Test Item    Value        Reference Range Interpretation Comments

 

             UA Spec Grav (test code = UA Spec 1.015 1                          

      



             Grav)                                               



[facility] AND [id number] 21:03:00





             Test Item    Value        Reference Range Interpretation Comments

 

             UA pH (test code = UA pH) 5.0 1        5.0-8.0                   



[facility] AND [id number] 21:03:00





             Test Item    Value        Reference Range Interpretation Comments

 

             UA Protein (test code = UA Negative mg/dL                          

 



             Protein)                                            



[facility] AND [id number] 21:03:00





             Test Item    Value        Reference Range Interpretation Comments

 

             UA Glucose (test code = UA Negative mg/dL                          

 



             Glucose)                                            



[facility] AND [id number] 21:03:00





             Test Item    Value        Reference Range Interpretation Comments

 

             UA Ketones (test code = UA Negative mg/dL                          

 



             Ketones)                                            



[facility] AND [id number] 21:03:00





             Test Item    Value        Reference Range Interpretation Comments

 

             UA Bili (test code = Negative *NA*(3/26/22                         

  



             UA Bili)     4:03 PM)                               



[facility] AND [id number] 21:03:00





             Test Item    Value        Reference Range Interpretation Comments

 

             UA Blood (test code = Negative (3/26/22 4:03                       

    



             UA Blood)    PM)                                    



[facility] AND [id number] 21:03:00





             Test Item    Value        Reference Range Interpretation Comments

 

             UA Urobilinogen (test code = UA no gt        0.1-1.0               

    



             Urobilinogen)                                        



[facility] AND [id number] 21:03:00





             Test Item    Value        Reference Range Interpretation Comments

 

             UA Nitrite (test code Negative (3/26/22 4:03                       

    



             = UA Nitrite) PM)                                    



[facility] AND [id number] 21:03:00





             Test Item    Value        Reference Range Interpretation Comments

 

             UA Leuk Est (test code Large *ABN*(3/26/22                         

  



             = UA Leuk Est) 4:03 PM)                               



[facility] AND [id number] 21:03:00





             Test Item    Value        Reference Range Interpretation Comments

 

             UA WBC (test code = 64           See_Comment                [Automa

huma message] The



             UA WBC)                                             system which ge

nerated this



                                                                 result transmit

huma



                                                                 reference range

: <=5. The



                                                                 reference range

 was not



                                                                 used to interpr

et this



                                                                 result as zayda

l/abnormal.



[facility] AND [id number] 21:03:00





             Test Item    Value        Reference Range Interpretation Comments

 

             UA RBC (test code = 2            See_Comment                [Automa

huma message] The



             UA RBC)                                             system which ge

nerated this



                                                                 result transmit

huma



                                                                 reference range

: <=2. The



                                                                 reference range

 was not



                                                                 used to interpr

et this



                                                                 result as zayda

l/abnormal.



[facility] AND [id number] 21:03:00





             Test Item    Value        Reference Range Interpretation Comments

 

             UA Mucus (test code = UA Mucus) Few /LPF                           

    



[facility] AND [id number] 21:03:00





             Test Item    Value        Reference Range Interpretation Comments

 

             UA Sq Epi (test code = UA Sq Epi) None Seen                        

      



[facility]2022-03-26 21:03:00





             Test Item    Value        Reference Range Interpretation Comments

 

             Glucose Lvl (test code = Glucose Lvl) 126          70-99           

          



[place] 334942-03-26 21:03:00





             Test Item    Value        Reference Range Interpretation Comments

 

             BUN (test code = BUN) 16           7-22                      



[place] 334942-03-26 21:03:00





             Test Item    Value        Reference Range Interpretation Comments

 

             Creatinine Lvl (test code = Creatinine 0.76         0.50-1.40      

           



             Lvl)                                                



[place] 334942-03-26 21:03:00





             Test Item    Value        Reference Range Interpretation Comments

 

             Sodium Lvl (test code = Sodium Lvl) 140          135-145           

        



[place] 334942-03-26 21:03:00





             Test Item    Value        Reference Range Interpretation Comments

 

             Potassium Lvl (test code = Potassium 3.3          3.5-5.1          

         



             Lvl)                                                



[place] 334942-03-26 21:03:00





             Test Item    Value        Reference Range Interpretation Comments

 

             Chloride Lvl (test code = Chloride Lvl) 111                  

            



[place] 334942-03-26 21:03:00





             Test Item    Value        Reference Range Interpretation Comments

 

             CO2 (test code = CO2) 22           24-32                     



[place] 334942-03-26 21:03:00





             Test Item    Value        Reference Range Interpretation Comments

 

             Calcium Lvl (test code = Calcium Lvl) 8.5          8.5-10.5        

          



[place] 334942-03-26 21:03:00





             Test Item    Value        Reference Range Interpretation Comments

 

             AGAP (test code = AGAP) 10.3         10.0-20.0                 



[place] 334942-03-26 21:03:00





             Test Item    Value        Reference Range Interpretation Comments

 

             eGFR (test code = eGFR) 117                                    



[place] 334942-03-26 21:03:00





             Test Item    Value        Reference Range Interpretation Comments

 

             Lactic Acid Lvl (test code = Lactic 1.1          0.5-2.2           

        



             Acid Lvl)                                           



[place] 236402-03-26 21:03:00





             Test Item    Value        Reference Range Interpretation Comments

 

             Segs (test code = Segs) 68.9         45.0-75.0                 



[place] 236402-03-26 21:03:00





             Test Item    Value        Reference Range Interpretation Comments

 

             Lymphocytes (test code = Lymphocytes) 20.4         20.0-40.0       

          



[place] 236402-03-26 21:03:00





             Test Item    Value        Reference Range Interpretation Comments

 

             Monocytes (test code = Monocytes) 8.2          2.0-12.0            

      



[place]402-03-26 21:03:00





             Test Item    Value        Reference Range Interpretation Comments

 

             Eosinophils (test code = 2.2          See_Comment                [A

utomated message] The



             Eosinophils)                                        system which ge

nerated



                                                                 this result tra

nsmitted



                                                                 reference range

: <=4.0.



                                                                 The reference r

zhen was



                                                                 not used to int

erpret



                                                                 this result as



                                                                 normal/abnormal

.



[place]402-03-26 21:03:00





             Test Item    Value        Reference Range Interpretation Comments

 

             Basophils (test code = 0.3          See_Comment                [Aut

omated message] The



             Basophils)                                          system which ge

nerated



                                                                 this result tra

nsmitted



                                                                 reference range

: <=1.0.



                                                                 The reference r

zhen was



                                                                 not used to int

erpret



                                                                 this result as



                                                                 normal/abnormal

.



[place] 236402-03-26 21:03:00





             Test Item    Value        Reference Range Interpretation Comments

 

             Neutrophils # (test code = Neutrophils 5.5          1.5-8.1        

           



             #)                                                  



[place] 236402-03-26 21:03:00





             Test Item    Value        Reference Range Interpretation Comments

 

             Lymphocytes # (test code = Lymphocytes 1.6          1.0-5.5        

           



             #)                                                  



[place] 236402-03-26 21:03:00





             Test Item    Value        Reference Range Interpretation Comments

 

             Monocytes # (test code 0.7          See_Comment                [Aut

omated message] The



             = Monocytes #)                                        system which 

generated



                                                                 this result tra

nsmitted



                                                                 reference range

: <=0.8.



                                                                 The reference r

zhen was



                                                                 not used to int

erpret



                                                                 this result as



                                                                 normal/abnormal

.



[id number] 21:03:00





             Test Item    Value        Reference Range Interpretation Comments

 

             Eosinophils # (test code 0.2          See_Comment                [A

utomated message] The



             = Eosinophils #)                                        system whic

h generated



                                                                 this result tra

nsmitted



                                                                 reference range

: <=0.5.



                                                                 The reference r

zhen was



                                                                 not used to int

erpret



                                                                 this result as



                                                                 normal/abnormal

.



[id number] 21:03:00





             Test Item    Value        Reference Range Interpretation Comments

 

             WBC (test code = WBC) 8.0          3.7-10.4                  



[facility]villeYgbqbagTKTAKIAKRX0306-44-25 21:03:00





             Test Item    Value        Reference Range Interpretation Comments

 

             RBC (test code = RBC) 5.37         4.70-6.10                 



[id number] 21:03:00





             Test Item    Value        Reference Range Interpretation Comments

 

             Hgb (test code = Hgb) 15.9         14.0-18.0                 



[facility]villeAfxtvoxIJRKALPOIJ8858-31-86 21:03:00





             Test Item    Value        Reference Range Interpretation Comments

 

             Hct (test code = Hct) 47.3         42.0-54.0                 



[facility]villeSkpxsmbTOCJCBXHNW9032-24-81 21:03:00





             Test Item    Value        Reference Range Interpretation Comments

 

             MCV (test code = MCV) 88.1         80.0-94.0                 



[facility]villeQkwhxajTPUXLNMYKE7649-51-22 21:03:00





             Test Item    Value        Reference Range Interpretation Comments

 

             MCH (test code = MCH) 29.6 pg      27.0-31.0                 



[facility]villeGebqeggFWAJRBNWGA1761-18-55 21:03:00





             Test Item    Value        Reference Range Interpretation Comments

 

             MCHC (test code = MCHC) 33.6         32.0-36.0                 



[facility]villeKicgazbFNMCBLCLXG7423-86-20 21:03:00





             Test Item    Value        Reference Range Interpretation Comments

 

             RDW (test code = RDW) 15.3         11.5-14.5                 



[facility]villeKqlskwdGNDCVQEGCJ3154-46-02 21:03:00





             Test Item    Value        Reference Range Interpretation Comments

 

             Platelet (test code = Platelet) 370          133-450               

    



[facility]villeBmqkpaxHYXTSPIAKY7460-09-32 21:03:00





             Test Item    Value        Reference Range Interpretation Comments

 

             MPV (test code = MPV) 8.1          7.4-10.4                  



[facility] AND [id number] 21:03:00





             Test Item    Value        Reference Range Interpretation Comments

 

             UA Comment 1 (test Suboptimal specimen                           



             code = UA Comment 1) received. Results may be                      

     



                          inaccurate due to the                           



                          age of the specimen.                           



                          Interpret results with                           



                          caution.                               



[facility] AND [id number] 21:03:00





             Test Item    Value        Reference Range Interpretation Comments

 

             UA Color (test code = Yellow *NA*(3/26/22                          

 



             UA Color)    4:03 PM)                               



[facility] AND [id number] 21:03:00





             Test Item    Value        Reference Range Interpretation Comments

 

             UA Turbidity (test code Slight *ABN*(3/26/22                       

    



             = UA Turbidity) 4:03 PM)                               



[facility] AND [id number] 21:03:00





             Test Item    Value        Reference Range Interpretation Comments

 

             UA Spec Grav (test code = UA Spec 1.015 1                          

      



             Grav)                                               



[facility] AND [id number] 21:03:00





             Test Item    Value        Reference Range Interpretation Comments

 

             UA pH (test code = UA pH) 5.0 1        5.0-8.0                   



[facility] AND [id number] 21:03:00





             Test Item    Value        Reference Range Interpretation Comments

 

             UA Protein (test code = UA Negative mg/dL                          

 



             Protein)                                            



[facility] AND [id number] 21:03:00





             Test Item    Value        Reference Range Interpretation Comments

 

             UA Glucose (test code = UA Negative mg/dL                          

 



             Glucose)                                            



[facility] AND [id number] 21:03:00





             Test Item    Value        Reference Range Interpretation Comments

 

             UA Ketones (test code = UA Negative mg/dL                          

 



             Ketones)                                            



[facility] AND [id number] 21:03:00





             Test Item    Value        Reference Range Interpretation Comments

 

             UA Bili (test code = Negative *NA*(3/26/22                         

  



             UA Bili)     4:03 PM)                               



[facility] AND [id number] 21:03:00





             Test Item    Value        Reference Range Interpretation Comments

 

             UA Blood (test code = Negative (3/26/22 4:03                       

    



             UA Blood)    PM)                                    



[facility] AND [id number] 21:03:00





             Test Item    Value        Reference Range Interpretation Comments

 

             UA Urobilinogen (test code = UA no gt        0.1-1.0               

    



             Urobilinogen)                                        



[facility] AND [id number] 21:03:00





             Test Item    Value        Reference Range Interpretation Comments

 

             UA Nitrite (test code Negative (3/26/22 4:03                       

    



             = UA Nitrite) PM)                                    



[facility] AND [id number] 21:03:00





             Test Item    Value        Reference Range Interpretation Comments

 

             UA Leuk Est (test code Large *ABN*(3/26/22                         

  



             = UA Leuk Est) 4:03 PM)                               



[facility] AND [id number] 21:03:00





             Test Item    Value        Reference Range Interpretation Comments

 

             UA WBC (test code = 64           See_Comment                [Automa

huma message] The



             UA WBC)                                             system which ge

nerated this



                                                                 result transmit

huma



                                                                 reference range

: <=5. The



                                                                 reference range

 was not



                                                                 used to interpr

et this



                                                                 result as zayda

l/abnormal.



[facility] AND [id number] 21:03:00





             Test Item    Value        Reference Range Interpretation Comments

 

             UA RBC (test code = 2            See_Comment                [Automa

huma message] The



             UA RBC)                                             system which ge

nerated this



                                                                 result transmit

huma



                                                                 reference range

: <=2. The



                                                                 reference range

 was not



                                                                 used to interpr

et this



                                                                 result as zayda

l/abnormal.



[facility] AND [id number] 21:03:00





             Test Item    Value        Reference Range Interpretation Comments

 

             UA Mucus (test code = UA Mucus) Few /LPF                           

    



[facility] AND [id number] 21:03:00





             Test Item    Value        Reference Range Interpretation Comments

 

             UA Sq Epi (test code = UA Sq Epi) None Seen                        

      



[id number] 09:58:00





             Test Item    Value        Reference Range Interpretation Comments

 

             Lactic Acid Lvl (test code = Lactic 2.4          0.5-2.2           

        



             Acid Lvl)                                           



[id number] 09:58:00





             Test Item    Value        Reference Range Interpretation Comments

 

             Sed Rate (test code = 13           See_Comment                [Auto

mated message] The



             Sed Rate)                                           system which ge

nerated this



                                                                 result transmit

huma



                                                                 reference range

: <=15. The



                                                                 reference range

 was not



                                                                 used to interpr

et this



                                                                 result as zayda

l/abnormal.



[id number] 09:58:00





             Test Item    Value        Reference Range Interpretation Comments

 

             C-REACTIVE PROTEIN (test code = 10.6                               

    



             C-REACTIVE PROTEIN)                                        



[facility]2022-03-26 09:58:00





             Test Item    Value        Reference Range Interpretation Comments

 

             Lactic Acid Lvl (test code = Lactic 2.4          0.5-2.2           

        



             Acid Lvl)                                           



[id number] 09:58:00





             Test Item    Value        Reference Range Interpretation Comments

 

             Sed Rate (test code = 13           See_Comment                [Auto

mated message] The



             Sed Rate)                                           system which ge

nerated this



                                                                 result transmit

huma



                                                                 reference range

: <=15. The



                                                                 reference range

 was not



                                                                 used to interpr

et this



                                                                 result as zayda

l/abnormal.



[place] [address]-03-26 09:58:00





             Test Item    Value        Reference Range Interpretation Comments

 

             C-REACTIVE PROTEIN (test code = 10.6                               

    



             C-REACTIVE PROTEIN)                                        



[facility]2022-03-26 09:58:00





             Test Item    Value        Reference Range Interpretation Comments

 

             Lactic Acid Lvl (test code = Lactic 2.4          0.5-2.2           

        



             Acid Lvl)                                           



[place] [id number] 09:58:00





             Test Item    Value        Reference Range Interpretation Comments

 

             Sed Rate (test code = 13           See_Comment                [Auto

mated message] The



             Sed Rate)                                           system which ge

nerated this



                                                                 result transmit

huma



                                                                 reference range

: <=15. The



                                                                 reference range

 was not



                                                                 used to interpr

et this



                                                                 result as zayda

l/abnormal.



[place] [address]-03-26 09:58:00





             Test Item    Value        Reference Range Interpretation Comments

 

             C-REACTIVE PROTEIN (test code = 10.6                               

    



             C-REACTIVE PROTEIN)                                        



[facility]2022-03-25 18:36:00





             Test Item    Value        Reference Range Interpretation Comments

 

             Protein CSF (test code = Protein CSF) 14           15-45           

          



[place] [id number] 18:36:00





             Test Item    Value        Reference Range Interpretation Comments

 

             Glucose CSF (test code = Glucose CSF) 67           45-80           

          



[place] [id number] 18:36:00





             Test Item    Value        Reference Range Interpretation Comments

 

             Tube Num CSF (test xxxxxxx (3/25/22 1:36                           



             code = Tube Num CSF) PM)                                    



[place] [id number] 18:36:00





             Test Item    Value        Reference Range Interpretation Comments

 

             Color CSF (test code Colorless (3/25/22 1:36                       

    



             = Color CSF) PM)                                    



[facility]2022-03-25 18:36:00





             Test Item    Value        Reference Range Interpretation Comments

 

             Clarity CSF (test code = Clear (3/25/22 1:36                       

    



             Clarity CSF) PM)                                    



[facility]2022-03-25 18:36:00





             Test Item    Value        Reference Range Interpretation Comments

 

             Supernat CSF (test Colorless (3/25/22 1:36                         

  



             code = Supernat CSF) PM)                                    



[facility]2022-03-25 18:36:00





             Test Item    Value        Reference Range Interpretation Comments

 

             Nucleated Cells CSF 0            See_Comment                [Automa

huma message] The



             (test code = Nucleated                                        syste

m which generated



             Cells CSF)                                          this result tra

nsmitted



                                                                 reference range

: <=53.



                                                                 The reference r

zhen was



                                                                 not used to int

erpret



                                                                 this result as



                                                                 normal/abnormal

.



[facility]2022-03-25 18:36:00





             Test Item    Value        Reference Range Interpretation Comments

 

             RBC CSF (test code = 0            See_Comment                [Autom

ated message] The



             RBC CSF)                                            system which ge

nerated this



                                                                 result transmit

huma



                                                                 reference range

: <=03. The



                                                                 reference range

 was not



                                                                 used to interpr

et this



                                                                 result as zayda

l/abnormal.



[facility]2022-03-25 18:36:00





             Test Item    Value        Reference Range Interpretation Comments

 

             Comment CSF (test Differential not                           



             code = Comment CSF) performed on WBC count of                      

     



                          less than 5.                           



[facility] [id number] 18:36:00





             Test Item    Value        Reference Range Interpretation Comments

 

             Gram Stain Report Gram Stain Performed By:                         

  



             (test code = Gram [facility]                           



             Stain Report) [facility] [id number] 18:36:00





             Test Item    Value        Reference Range Interpretation Comments

 

             Culture: CSF w/Gram Stain (test No Growth                          

    



             code = Culture: CSF w/Gram Stain)                                  

      



[facility]2022-03-25 18:36:00





             Test Item    Value        Reference Range Interpretation Comments

 

             Protein CSF (test code = Protein CSF) 14           15-45           

          



[facility]2022-03-25 18:36:00





             Test Item    Value        Reference Range Interpretation Comments

 

             Glucose CSF (test code = Glucose CSF) 67           45-80           

          



[facility]2022-03-25 18:36:00





             Test Item    Value        Reference Range Interpretation Comments

 

             Tube Num CSF (test xxxxxxx (3/25/22 1:36                           



             code = Tube Num CSF) PM)                                    



[facility]2022-03-25 18:36:00





             Test Item    Value        Reference Range Interpretation Comments

 

             Color CSF (test code Colorless (3/25/22 1:36                       

    



             = Color CSF) PM)                                    



[facility]2022-03-25 18:36:00





             Test Item    Value        Reference Range Interpretation Comments

 

             Clarity CSF (test code = Clear (3/25/22 1:36                       

    



             Clarity CSF) PM)                                    



[facility]2022-03-25 18:36:00





             Test Item    Value        Reference Range Interpretation Comments

 

             Supernat CSF (test Colorless (3/25/22 1:36                         

  



             code = Supernat CSF) PM)                                    



[facility]2022-03-25 18:36:00





             Test Item    Value        Reference Range Interpretation Comments

 

             Nucleated Cells CSF 0            See_Comment                [Automa

huma message] The



             (test code = Nucleated                                        syste

m which generated



             Cells CSF)                                          this result tra

nsmitted



                                                                 reference range

: <=53.



                                                                 The reference r

zhen was



                                                                 not used to int

erpret



                                                                 this result as



                                                                 normal/abnormal

.



[facility]2022-03-25 18:36:00





             Test Item    Value        Reference Range Interpretation Comments

 

             RBC CSF (test code = 0            See_Comment                [Autom

ated message] The



             RBC CSF)                                            system which ge

nerated this



                                                                 result transmit

huma



                                                                 reference range

: <=03. The



                                                                 reference range

 was not



                                                                 used to interpr

et this



                                                                 result as zayda

l/abnormal.



[facility]2022-03-25 18:36:00





             Test Item    Value        Reference Range Interpretation Comments

 

             Comment CSF (test Differential not                           



             code = Comment CSF) performed on WBC count of                      

     



                          less than 5.                           



[facility] [id number] 18:36:00





             Test Item    Value        Reference Range Interpretation Comments

 

             Gram Stain Report Gram Stain Performed By:                         

  



             (test code = Gram [facility]                           



             Stain Report) [facility] w/Gram [id number] 18:36:00





             Test Item    Value        Reference Range Interpretation Comments

 

             Culture: CSF w/Gram Stain (test No Growth                          

    



             code = Culture: CSF w/Gram Stain)                                  

      



[facility]2022-03-25 18:36:00





             Test Item    Value        Reference Range Interpretation Comments

 

             Protein CSF (test code = Protein CSF) 14           15-45           

          



[place] 685062-03-25 18:36:00





             Test Item    Value        Reference Range Interpretation Comments

 

             Glucose CSF (test code = Glucose CSF) 67           45-80           

          



[facility] [id number] 18:36:00





             Test Item    Value        Reference Range Interpretation Comments

 

             Tube Num CSF (test xxxxxxx (3/25/22 1:36                           



             code = Tube Num CSF) PM)                                    



[facility]2022-03-25 18:36:00





             Test Item    Value        Reference Range Interpretation Comments

 

             Color CSF (test code Colorless (3/25/22 1:36                       

    



             = Color CSF) PM)                                    



[facility] [id number] 18:36:00





             Test Item    Value        Reference Range Interpretation Comments

 

             Clarity CSF (test code = Clear (3/25/22 1:36                       

    



             Clarity CSF) PM)                                    



[facility]2022-03-25 18:36:00





             Test Item    Value        Reference Range Interpretation Comments

 

             Supernat CSF (test Colorless (3/25/22 1:36                         

  



             code = Supernat CSF) PM)                                    



[facility]2022-03-25 18:36:00





             Test Item    Value        Reference Range Interpretation Comments

 

             Nucleated Cells CSF 0            See_Comment                [Automa

huma message] The



             (test code = Nucleated                                        syste

m which generated



             Cells CSF)                                          this result tra

nsmitted



                                                                 reference range

: <=53.



                                                                 The reference r

zhen was



                                                                 not used to int

erpret



                                                                 this result as



                                                                 normal/abnormal

.



[facility]2022-03-25 18:36:00





             Test Item    Value        Reference Range Interpretation Comments

 

             RBC CSF (test code = 0            See_Comment                [Autom

ated message] The



             RBC CSF)                                            system which ge

nerated this



                                                                 result transmit

huma



                                                                 reference range

: <=03. The



                                                                 reference range

 was not



                                                                 used to interpr

et this



                                                                 result as zayda

l/abnormal.



[facility]2022-03-25 18:36:00





             Test Item    Value        Reference Range Interpretation Comments

 

             Comment CSF (test Differential not                           



             code = Comment CSF) performed on WBC count of                      

     



                          less than 5.                           



[facility] [id number] 18:36:00





             Test Item    Value        Reference Range Interpretation Comments

 

             Gram Stain Report Gram Stain Performed By:                         

  



             (test code = Gram [facility]                           



             Stain Report) [facility] w/Gram [id number] 18:36:00





             Test Item    Value        Reference Range Interpretation Comments

 

             Culture: CSF w/Gram Stain (test No Growth                          

    



             code = Culture: CSF w/Gram Stain)                                  

      



[id number]





             Test Item    Value        Reference Range Interpretation Comments

 

             Coronavirus (COVID-19) Not Detected (3/25/22                       

    



             ZEYAD (test code = 3:28 AM)                               



             Coronavirus (COVID-19)                                        



             ZEYAD)                                                



[facility] [id number] 08:28:00





             Test Item    Value        Reference Range Interpretation Comments

 

             Coronavirus (COVID-19) Not Detected (3/25/22                       

    



             ZEYAD (test code = 3:28 AM)                               



             Coronavirus (COVID-19)                                        



             ZEYAD)                                                



[facility] [id number] 08:28:00





             Test Item    Value        Reference Range Interpretation Comments

 

             Coronavirus (COVID-19) Not Detected (3/25/22                       

    



             ZEYAD (test code = 3:28 AM)                               



             Coronavirus (COVID-19)                                        



             ZEYAD)                                                



[facility] [id number] 06:48:00





             Test Item    Value        Reference Range Interpretation Comments

 

             Antibody Scrn (test Negative (3/25/22 1:48                         

  



             code = Antibody Scrn) AM)                                    



[facility] [id number] 06:48:00





             Test Item    Value        Reference Range Interpretation Comments

 

             ABO/Rh (test code = ABO/Rh) AB POS                                 



[facility][id number] 06:48:00





             Test Item    Value        Reference Range Interpretation Comments

 

             Segs (test code = Segs) 70.8         45.0-75.0                 



[facility] Stephe[id number] 06:48:00





             Test Item    Value        Reference Range Interpretation Comments

 

             Lymphocytes (test code = Lymphocytes) 20.8         20.0-40.0       

          



[facility] Steph[id number] 06:48:00





             Test Item    Value        Reference Range Interpretation Comments

 

             Monocytes (test code = Monocytes) 5.8          2.0-12.0            

      



[facility] Stephe[id number] 06:48:00





             Test Item    Value        Reference Range Interpretation Comments

 

             Eosinophils (test code = 2.3          See_Comment                [A

utomated message] The



             Eosinophils)                                        system which ge

nerated



                                                                 this result tra

nsmitted



                                                                 reference range

: <=4.0.



                                                                 The reference r

zhen was



                                                                 not used to int

erpret



                                                                 this result as



                                                                 normal/abnormal

.



[facility] Steph[id number] 06:48:00





             Test Item    Value        Reference Range Interpretation Comments

 

             Basophils (test code = 0.3          See_Comment                [Aut

omated message] The



             Basophils)                                          system which ge

nerated



                                                                 this result tra

nsmitted



                                                                 reference range

: <=1.0.



                                                                 The reference r

zhen was



                                                                 not used to int

erpret



                                                                 this result as



                                                                 normal/abnormal

.



[facility]villeGiordlvNRIIMIPFNF1459-81-90 [facility]:48:00





             Test Item    Value        Reference Range Interpretation Comments

 

             Neutrophils # (test code = Neutrophils 8.4          1.5-8.1        

           



             #)                                                  



[place]402-03-25 06:48:00





             Test Item    Value        Reference Range Interpretation Comments

 

             Lymphocytes # (test code = Lymphocytes 2.5          1.0-5.5        

           



             #)                                                  



[place] 236402-03-25 06:48:00





             Test Item    Value        Reference Range Interpretation Comments

 

             Monocytes # (test code 0.7          See_Comment                [Aut

omated message] The



             = Monocytes #)                                        system which 

generated



                                                                 this result tra

nsmitted



                                                                 reference range

: <=0.8.



                                                                 The reference r

zhen was



                                                                 not used to int

erpret



                                                                 this result as



                                                                 normal/abnormal

.



[place] 236402-03-25 06:48:00





             Test Item    Value        Reference Range Interpretation Comments

 

             Eosinophils # (test code 0.3          See_Comment                [A

utomated message] The



             = Eosinophils #)                                        system whic

h generated



                                                                 this result tra

nsmitted



                                                                 reference range

: <=0.5.



                                                                 The reference r

zhen was



                                                                 not used to int

erpret



                                                                 this result as



                                                                 normal/abnormal

.



[facility]villeUnxpysyIGUUHQTAVX9903-99-16 06:48:00





             Test Item    Value        Reference Range Interpretation Comments

 

             WBC X 10x3 (test code = WBC X 10x3) 11.9         3.7-10.4          

        



[place] 236402-03-25 06:48:00





             Test Item    Value        Reference Range Interpretation Comments

 

             RBC X 10x6 (test code = RBC X 10x6) 5.95         4.70-6.10         

        



[place] 236402-03-25 06:48:00





             Test Item    Value        Reference Range Interpretation Comments

 

             Hgb (test code = Hgb) 17.9         14.0-18.0                 



[place] 236402-03-25 06:48:00





             Test Item    Value        Reference Range Interpretation Comments

 

             Hct (test code = Hct) 52.0         42.0-54.0                 



[place] 236402-03-25 06:48:00





             Test Item    Value        Reference Range Interpretation Comments

 

             MCV (test code = MCV) 87.5         80.0-94.0                 



[facility] HospitalNixgopyGBLLBWHIMO4736-16-[facility]:48:00





             Test Item    Value        Reference Range Interpretation Comments

 

             MCH (test code = MCH) 30.2 pg      27.0-31.0                 



[facility] HospitalDvpwfyoBOAOLDWXRT1540-08-55 06:48:00





             Test Item    Value        Reference Range Interpretation Comments

 

             MCHC (test code = MCHC) 34.5         32.0-36.0                 



[facility] HospitalQmfkwscYQMQBAGMRA6851-67-78 06:48:00





             Test Item    Value        Reference Range Interpretation Comments

 

             RDW (test code = RDW) 15.4         11.5-14.5                 



[facility] HospitalJydxluuHCLRWJLLFF3447-13-96 06:48:00





             Test Item    Value        Reference Range Interpretation Comments

 

             Platelet (test code = Platelet) 414          133-450               

    



[facility] HospitalClphnbjDGCKUKXMLE3964-62-16 06:48:00





             Test Item    Value        Reference Range Interpretation Comments

 

             MPV (test code = MPV) 8.5          7.4-10.4                  



[facility] HospitalCdaciufDCELCDUWNG7378-17-65 06:48:00





             Test Item    Value        Reference Range Interpretation Comments

 

             PT (test code = PT) 12.9 s       12.0-14.7                 



[facility] HospitalAreqvskBAMAHDBBOO5886-69-92 06:48:00





             Test Item    Value        Reference Range Interpretation Comments

 

             INR (test code = INR) 0.98 1       0.85-1.17                 



[facility] HospitalQpgxnbyMARTJCTGYS0098-45-06 06:48:00





             Test Item    Value        Reference Range Interpretation Comments

 

             PTT (test code = PTT) 31.4 s       22.9-35.8                 



[facility] GSMKQUZ0396-54-99 06:48:00





             Test Item    Value        Reference Range Interpretation Comments

 

             Antibody Scrn (test Negative (3/25/22 1:48                         

  



             code = Antibody Scrn) AM)                                    



[facility] [employer] [id number] 06:48:00





             Test Item    Value        Reference Range Interpretation Comments

 

             ABO/Rh (test code = ABO/Rh) AB POS                                 



[facility] YgseujxQPSUKYQWRC5458-88-73 06:48:00





             Test Item    Value        Reference Range Interpretation Comments

 

             Segs (test code = Segs) 70.8         45.0-75.0                 



[facility] HospitalZuegxbuQTGULPVGCA4889-22-85 06:48:00





             Test Item    Value        Reference Range Interpretation Comments

 

             Lymphocytes (test code = Lymphocytes) 20.8         20.0-40.0       

          



[place] [address]-03-25 06:48:00





             Test Item    Value        Reference Range Interpretation Comments

 

             Monocytes (test code = Monocytes) 5.8          2.0-12.0            

      



[place] [address]-03-25 06:48:00





             Test Item    Value        Reference Range Interpretation Comments

 

             Eosinophils (test code = 2.3          See_Comment                [A

utomated message] The



             Eosinophils)                                        system which ge

nerated



                                                                 this result tra

nsmitted



                                                                 reference range

: <=4.0.



                                                                 The reference r

zhen was



                                                                 not used to int

erpret



                                                                 this result as



                                                                 normal/abnormal

.



[place] [address]385-03-25 06:48:00





             Test Item    Value        Reference Range Interpretation Comments

 

             Basophils (test code = 0.3          See_Comment                [Aut

omated message] The



             Basophils)                                          system which ge

nerated



                                                                 this result tra

nsmitted



                                                                 reference range

: <=1.0.



                                                                 The reference r

zhen was



                                                                 not used to int

erpret



                                                                 this result as



                                                                 normal/abnormal

.



[place] [address]-03-25 06:48:00





             Test Item    Value        Reference Range Interpretation Comments

 

             Neutrophils # (test code = Neutrophils 8.4          1.5-8.1        

           



             #)                                                  



[place] [address]-03-25 06:48:00





             Test Item    Value        Reference Range Interpretation Comments

 

             Lymphocytes # (test code = Lymphocytes 2.5          1.0-5.5        

           



             #)                                                  



[place] [address]-03-25 06:48:00





             Test Item    Value        Reference Range Interpretation Comments

 

             Monocytes # (test code 0.7          See_Comment                [Aut

omated message] The



             = Monocytes #)                                        system which 

generated



                                                                 this result tra

nsmitted



                                                                 reference range

: <=0.8.



                                                                 The reference r

zhen was



                                                                 not used to int

erpret



                                                                 this result as



                                                                 normal/abnormal

.



[place] [address]-03-25 06:48:00





             Test Item    Value        Reference Range Interpretation Comments

 

             Eosinophils # (test code 0.3          See_Comment                [A

utomated message] The



             = Eosinophils #)                                        system whic

h generated



                                                                 this result tra

nsmitted



                                                                 reference range

: <=0.5.



                                                                 The reference r

zhen was



                                                                 not used to int

erpret



                                                                 this result as



                                                                 normal/abnormal

.



[place]402-03-25 06:48:00





             Test Item    Value        Reference Range Interpretation Comments

 

             WBC X 10x3 (test code = WBC X 10x3) 11.9         3.7-10.4          

        



[facility]villeNuqdaqrQYKFCHCDML6814-41-49 06:48:00





             Test Item    Value        Reference Range Interpretation Comments

 

             RBC X 10x6 (test code = RBC X 10x6) 5.95         4.70-6.10         

        



[facility] StephenvilleTghtzjbRDKYIXXQYB9506-00-71 06:48:00





             Test Item    Value        Reference Range Interpretation Comments

 

             Hgb (test code = Hgb) 17.9         14.0-18.0                 



[facility] StephenvilleZtguteoAWBBABZVES4137-64-11 06:48:00





             Test Item    Value        Reference Range Interpretation Comments

 

             Hct (test code = Hct) 52.0         42.0-54.0                 



[facility] StephenvilleTrdchjcELOJNKZUOG9145-49-83 06:48:00





             Test Item    Value        Reference Range Interpretation Comments

 

             MCV (test code = MCV) 87.5         80.0-94.0                 



[facility] StephenvilleZornkkcFECPGJEEJO7831-13-44 06:48:00





             Test Item    Value        Reference Range Interpretation Comments

 

             MCH (test code = MCH) 30.2 pg      27.0-31.0                 



[facility] StephenvilleCzjzuauZSJKMZEPMY7807-54-90 06:48:00





             Test Item    Value        Reference Range Interpretation Comments

 

             MCHC (test code = MCHC) 34.5         32.0-36.0                 



[facility] StephenvilleHkopqggZIOIJXGWOO0124-81-11 06:48:00





             Test Item    Value        Reference Range Interpretation Comments

 

             RDW (test code = RDW) 15.4         11.5-14.5                 



[facility]villeTqbwdkqFCSZOYWTRW7648-95-64 06:48:00





             Test Item    Value        Reference Range Interpretation Comments

 

             Platelet (test code = Platelet) 414          133-450               

    



[facility]villeHjrjicpJWOITBRIEO2800-05-93 06:48:00





             Test Item    Value        Reference Range Interpretation Comments

 

             MPV (test code = MPV) 8.5          7.4-10.4                  



[facility] StephenvilleWsbkzqiMNXOOUSWGQ4076-78-90 06:48:00





             Test Item    Value        Reference Range Interpretation Comments

 

             PT (test code = PT) 12.9 s       12.0-14.7                 



[facility] StephenvilleKhhoxlqINXTAIBNST4354-91-73 06:48:00





             Test Item    Value        Reference Range Interpretation Comments

 

             INR (test code = INR) 0.98 1       0.85-1.17                 



[facility] StephenvilleBlipgmlONZHKQQMLV5495-09-83 06:48:00





             Test Item    Value        Reference Range Interpretation Comments

 

             PTT (test code = PTT) 31.4 s       22.9-35.8                 



[facility] KEIKOYV4568-97-46 06:48:00





             Test Item    Value        Reference Range Interpretation Comments

 

             Antibody Scrn (test Negative (3/25/22 1:48                         

  



             code = Antibody Scrn) AM)                                    



[facility] BUHFFOU3422-69-49 06:48:00





             Test Item    Value        Reference Range Interpretation Comments

 

             ABO/Rh (test code = ABO/Rh) AB POS                                 



[facility] StephenvilleQioixqgSQACJDKDVJ5355-48-53 06:48:00





             Test Item    Value        Reference Range Interpretation Comments

 

             Segs (test code = Segs) 70.8         45.0-75.0                 



[facility] StephenvilleZsgiliuWULEDNBFBZ1557-19-12 06:48:00





             Test Item    Value        Reference Range Interpretation Comments

 

             Lymphocytes (test code = Lymphocytes) 20.8         20.0-40.0       

          



[facility] StephenvilleSpzntloATERVZYHYM9610-64-66 06:48:00





             Test Item    Value        Reference Range Interpretation Comments

 

             Monocytes (test code = Monocytes) 5.8          2.0-12.0            

      



[facility] StephenvilleVjfazjqOBFFVYGVKE3630-30-75 06:48:00





             Test Item    Value        Reference Range Interpretation Comments

 

             Eosinophils (test code = 2.3          See_Comment                [A

utomated message] The



             Eosinophils)                                        system which ge

nerated



                                                                 this result tra

nsmitted



                                                                 reference range

: <=4.0.



                                                                 The reference r

zhen was



                                                                 not used to int

erpret



                                                                 this result as



                                                                 normal/abnormal

.



[facility] StephenvilleReoavbsWNMJHGTNAQ4043-82-70 06:48:00





             Test Item    Value        Reference Range Interpretation Comments

 

             Basophils (test code = 0.3          See_Comment                [Aut

omated message] The



             Basophils)                                          system which ge

nerated



                                                                 this result tra

nsmitted



                                                                 reference range

: <=1.0.



                                                                 The reference r

zhen was



                                                                 not used to int

erpret



                                                                 this result as



                                                                 normal/abnormal

.



[facility] StephenvilleIsuhxosOYWIAIFKWT4004-00-07 06:48:00





             Test Item    Value        Reference Range Interpretation Comments

 

             Neutrophils # (test code = Neutrophils 8.4          1.5-8.1        

           



             #)                                                  



[facility] StephenvilleVgjoscgAUUXXDXJQA6863-34-83 06:48:00





             Test Item    Value        Reference Range Interpretation Comments

 

             Lymphocytes # (test code = Lymphocytes 2.5          1.0-5.5        

           



             #)                                                  



[facility] StephenvilleJmiikbtGNPLJCNTPF6721-07-73 06:48:00





             Test Item    Value        Reference Range Interpretation Comments

 

             Monocytes # (test code 0.7          See_Comment                [Aut

omated message] The



             = Monocytes #)                                        system which 

generated



                                                                 this result tra

nsmitted



                                                                 reference range

: <=0.8.



                                                                 The reference r

zhen was



                                                                 not used to int

erpret



                                                                 this result as



                                                                 normal/abnormal

.



[facility] StephenvilleHmtebwoVHEKJSZOIJ9928-35-96 06:48:00





             Test Item    Value        Reference Range Interpretation Comments

 

             Eosinophils # (test code 0.3          See_Comment                [A

utomated message] The



             = Eosinophils #)                                        system whic

h generated



                                                                 this result tra

nsmitted



                                                                 reference range

: <=0.5.



                                                                 The reference r

zhen was



                                                                 not used to int

erpret



                                                                 this result as



                                                                 normal/abnormal

.



[facility] StephenvilleGtszhflXOAGHZPFCQ1745-79-31 06:48:00





             Test Item    Value        Reference Range Interpretation Comments

 

             WBC X 10x3 (test code = WBC X 10x3) 11.9         3.7-10.4          

        



[facility] StephenvilleBrhosnaUDEHWLVPPT5582-32-91 06:48:00





             Test Item    Value        Reference Range Interpretation Comments

 

             RBC X 10x6 (test code = RBC X 10x6) 5.95         4.70-6.10         

        



[facility] StephenvilleJjjcwatGGSUKANAAR1009-89-28 06:48:00





             Test Item    Value        Reference Range Interpretation Comments

 

             Hgb (test code = Hgb) 17.9         14.0-18.0                 



[facility] StephenvilleAspvoeaCPNEUAXXDA5204-42-62 06:48:00





             Test Item    Value        Reference Range Interpretation Comments

 

             Hct (test code = Hct) 52.0         42.0-54.0                 



[facility] StephenvilleQlosnxpMDLABHJKYV2885-11-24 06:48:00





             Test Item    Value        Reference Range Interpretation Comments

 

             MCV (test code = MCV) 87.5         80.0-94.0                 



[facility] StephenvilleJweseriSPJVYRALFM2574-16-25 06:48:00





             Test Item    Value        Reference Range Interpretation Comments

 

             MCH (test code = MCH) 30.2 pg      27.0-31.0                 



[facility] StephenvilleDuyjmxrXGIMMVPEAH8089-48-61 06:48:00





             Test Item    Value        Reference Range Interpretation Comments

 

             MCHC (test code = MCHC) 34.5         32.0-36.0                 



[facility] StephenvilleHijvkoeGZOYSDGGIJ9192-89-78 06:48:00





             Test Item    Value        Reference Range Interpretation Comments

 

             RDW (test code = RDW) 15.4         11.5-14.5                 



[facility] StephenvilleKllzvadBCSPDFQUIU6162-02-87 06:48:00





             Test Item    Value        Reference Range Interpretation Comments

 

             Platelet (test code = Platelet) 414          133-450               

    



[facility] StephenvilleKvrcoifVXMGMETGVB0467-29-11 06:48:00





             Test Item    Value        Reference Range Interpretation Comments

 

             MPV (test code = MPV) 8.5          7.4-10.4                  



[facility] StephenvilleOaoswprLNOFJLDGEF7062-76-19 06:48:00





             Test Item    Value        Reference Range Interpretation Comments

 

             PT (test code = PT) 12.9 s       12.0-14.7                 



[facility] StephenvilleRjaambzZBJLXHQRIW9369-11-34 06:48:00





             Test Item    Value        Reference Range Interpretation Comments

 

             INR (test code = INR) 0.98 1       0.85-1.17                 



[facility] StephenvillePefzpnxKQGXQVVPPL1521-63-10 06:48:00





             Test Item    Value        Reference Range Interpretation Comments

 

             PTT (test code = PTT) 31.4 s       22.9-35.8                 



[facility] WITH YZAN7560-78-87 00:23:28





             Test Item    Value        Reference Range Interpretation Comments

 

             WBC (test code =              See_Comment  H             [Automated



             0190-2)                                             message] The sy

stem



                                                                 which generated



                                                                 this result



                                                                 transmitted



                                                                 reference range

:



                                                                 4.20 - 10.70



                                                                 10*3/?L. The



                                                                 reference range

 was



                                                                 not used to



                                                                 interpret this



                                                                 result as



                                                                 normal/abnormal

.

 

             RBC (test code =              See_Comment  H             [Automated



             789-8)                                              message] The sy

stem



                                                                 which generated



                                                                 this result



                                                                 transmitted



                                                                 reference range

:



                                                                 4.26 - 5.52



                                                                 10*6/?L. The



                                                                 reference range

 was



                                                                 not used to



                                                                 interpret this



                                                                 result as



                                                                 normal/abnormal

.

 

             HGB (test code = 18.3 g/dL    12.2-16.4    H            



             718-7)                                              

 

             HCT (test code = 55.6 %       38.4-49.3    H            



             4544-3)                                             

 

             MCV (test code = 89.0 fL      81.7-95.6                 



             787-2)                                              

 

             MCH (test code = 29.3 pg      26.1-32.7                 



             785-6)                                              

 

             MCHC (test code = 32.9 g/dL    31.2-35.0                 



             786-4)                                              

 

             RDW-SD (test code = 47.6 fL      38.5-51.6                 



             10916-5)                                            

 

             RDW-CV (test code = 15.1 %       12.1-15.4                 



             788-0)                                              

 

             PLT (test code =              See_Comment  H             [Automated



             777-3)                                              message] The sy

stem



                                                                 which generated



                                                                 this result



                                                                 transmitted



                                                                 reference range

:



                                                                 150 - 328 10*3/

?L.



                                                                 The reference r

zhen



                                                                 was not used to



                                                                 interpret this



                                                                 result as



                                                                 normal/abnormal

.

 

             MPV (test code = 10.5 fL      9.8-13.0                  



             69220-6)                                            

 

             NRBC/100 WBC (test              See_Comment                [Automat

ed



             code = 2095339396)                                        message] 

The system



                                                                 which generated



                                                                 this result



                                                                 transmitted



                                                                 reference range

:



                                                                 0.0 - 10.0 /100



                                                                 WBCs. The refer

ence



                                                                 range was not u

sed



                                                                 to interpret th

is



                                                                 result as



                                                                 normal/abnormal

.

 

             NRBC x10^3 (test code <0.01        See_Comment                [Auto

mated



             = 8526859973)                                        message] The s

ystem



                                                                 which generated



                                                                 this result



                                                                 transmitted



                                                                 reference range

:



                                                                 10*3/?L. The



                                                                 reference range

 was



                                                                 not used to



                                                                 interpret this



                                                                 result as



                                                                 normal/abnormal

.

 

             GRAN MAT (NEUT) % 66.7 %                                 



             (test code = 770-8)                                        

 

             IMM GRAN % (test code 0.40 %                                 



             = 2963789720)                                        

 

             LYMPH % (test code = 24.4 %                                 



             736-9)                                              

 

             MONO % (test code = 6.6 %                                  



             5905-5)                                             

 

             EOS % (test code = 1.5 %                                  



             713-8)                                              

 

             BASO % (test code = 0.4 %                                  



             706-2)                                              

 

             GRAN MAT x10^3(ANC) 7.43 10*3/uL 1.99-6.95    H            



             (test code =                                        



             0358396895)                                         

 

             IMM GRAN x10^3 (test 0.04 10*3/uL 0.00-0.06                 



             code = 6080409228)                                        

 

             LYMPH x10^3 (test code 2.72 10*3/uL 1.09-3.23                 



             = 731-0)                                            

 

             MONO x10^3 (test code 0.74 10*3/uL 0.36-1.02                 



             = 742-7)                                            

 

             EOS x10^3 (test code = 0.17 10*3/uL 0.06-0.53                 



             711-2)                                              

 

             BASO x10^3 (test code 0.04 10*3/uL 0.01-0.09                 



             = 704-7)                                            

 

             Lab Interpretation Abnormal                               



             (test code = 33855-3)                                        



[facility]. METABOLIC PANEL ([id number] 
00:18:07





             Test Item    Value        Reference Range Interpretation Comments

 

             NA (test code = 139 mmol/L   135-145                   



             7253329434)                                         

 

             K (test code = 4.8 mmol/L   3.5-5.0                   



             2684356547)                                         

 

             CL (test code = 104 mmol/L                       



             4503255699)                                         

 

             CO2 TOTAL (test code = 22 mmol/L    23-31        L            



             9195720350)                                         

 

             AGAP (test code =              2-16                      



             2337643360)                                         

 

             BUN (test code = 15 mg/dL     7-23                      



             9246273563)                                         

 

             GLUCOSE (test code = 93 mg/dL                         



             0657542449)                                         

 

             CREATININE (test code = 0.67 mg/dL   0.60-1.25                 



             6561972859)                                         

 

             TOTAL BILI (test code = 0.7 mg/dL    0.1-1.1                   



             3865655590)                                         

 

             CALCIUM (test code = 9.8 mg/dL    8.6-10.6                  



             4059156582)                                         

 

             T PROTEIN (test code = 8.9 g/dL     6.3-8.2      H            



             5411550604)                                         

 

             ALBUMIN (test code = 4.9 g/dL     3.5-5.0                   



             6307258449)                                         

 

             ALK PHOS (test code = 126 U/L             H            



             5475454469)                                         

 

             ALTv (test code = 43 U/L       5-50                      



             1742-6)                                             

 

             AST(SGOT) (test code = 28 U/L       13-40                     



             7815920573)                                         

 

             eGFR (test code =              mL/min/1.73m2              



             8977350541)                                         

 

             MAL (test code = MAL) Association of                           



                          Glomerular Filtration                           



                          Rate (GFR) and Staging                           



                          of Kidney Disease*                           



                          +---------------------                           



                          --+-------------------                           



                          --+-------------------                           



                          ------+| GFR                           



                          (mL/min/1.73 m2) ?|                           



                          With Kidney Damage ?|                           



                          ?Without Kidney                           



                          Damage+---------------                           



                          --------+-------------                           



                          --------+-------------                           



                          ------------+| ?>90 ?                           



                          ? ? ? ? ? ? ? ?|                           



                          ?Stage one ? ? ? ? ?|                           



                          ? Normal ? ? ? ? ? ? ?                           



                          ?+--------------------                           



                          ---+------------------                           



                          ---+------------------                           



                          -------+| ?60-89 ? ? ?                           



                          ? ? ? ? ?| ?Stage two                           



                          ? ? ? ? ?| ? Decreased                           



                          GFR ? ? ? ?                            



                          +---------------------                           



                          --+-------------------                           



                          --+-------------------                           



                          ------+| ?30-59 ? ? ?                           



                          ? ? ? ? ?| ?Stage                           



                          three ? ? ? ?| ? Stage                           



                          three ? ? ? ? ?                           



                          +---------------------                           



                          --+-------------------                           



                          --+-------------------                           



                          ------+| ?15-29 ? ? ?                           



                          ? ? ? ? ?| ?Stage four                           



                          ? ? ? ? | ? Stage four                           



                          ? ? ? ? ?                              



                          ?+--------------------                           



                          ---+------------------                           



                          ---+------------------                           



                          -------+| ?<15 (or                           



                          dialysis) ? ?| ?Stage                           



                          five ? ? ? ? | ? Stage                           



                          five ? ? ? ? ?                           



                          ?+--------------------                           



                          ---+------------------                           



                          ---+------------------                           



                          -------+ *Each stage                           



                          assumes the associated                           



                          GFR level has been in                           



                          effect for at least                           



                          three months. ?Stages                           



                          1 to 5, with or                           



                          without kidney                           



                          disease, indicate                           



                          chronic kidney                           



                          disease. Notes:                           



                          Determination of                           



                          stages one and two                           



                          (with eGFR                             



                          >59mL/min/1.73 m2)                           



                          requires estimation of                           



                          kidney damage for at                           



                          least three months as                           



                          defined by structural                           



                          or functional                           



                          abnormalities of the                           



                          kidney, manifested by                           



                          either:Pathological                           



                          abnormalities or                           



                          Markers of kidney                           



                          damage (including                           



                          abnormalities in the                           



                          composition of the                           



                          blood or urine or                           



                          abnormalities in                           



                          imaging tests).                           

 

             Lab Interpretation Abnormal                               



             (test code = 60795-9)                                        



[facility]. METABOLIC PANEL ([id number])2022 
12:48:40





             Test Item    Value        Reference Range Interpretation Comments

 

             NA (test code = 137 mmol/L   135-145                   



             6535292505)                                         

 

             K (test code = 4.5 mmol/L   3.5-5.0                   



             0738993920)                                         

 

             CL (test code = 107 mmol/L                       



             6295943559)                                         

 

             CO2 TOTAL (test code = 24 mmol/L    23-31                     



             4726817423)                                         

 

             AGAP (test code =              2-16                      



             3990821921)                                         

 

             BUN (test code = 16 mg/dL     7-23                      



             1509097908)                                         

 

             GLUCOSE (test code = 116 mg/dL           H            



             1360872294)                                         

 

             CREATININE (test code = 0.62 mg/dL   0.60-1.25                 



             7143690147)                                         

 

             TOTAL BILI (test code = 0.4 mg/dL    0.1-1.1                   



             5728529503)                                         

 

             CALCIUM (test code = 8.7 mg/dL    8.6-10.6                  



             4348475310)                                         

 

             T PROTEIN (test code = 7.5 g/dL     6.3-8.2                   



             5928276614)                                         

 

             ALBUMIN (test code = 3.9 g/dL     3.5-5.0                   



             2527399807)                                         

 

             ALK PHOS (test code = 93 U/L                           



             5111560779)                                         

 

             ALTv (test code = 40 U/L       5-50                      



             1742-6)                                             

 

             AST(SGOT) (test code = 51 U/L       13-40        H            



             8277774132)                                         

 

             eGFR (test code =              mL/min/1.73m2              



             9338190698)                                         

 

             MAL (test code = MAL) Association of                           



                          Glomerular Filtration                           



                          Rate (GFR) and Staging                           



                          of Kidney Disease*                           



                          +---------------------                           



                          --+-------------------                           



                          --+-------------------                           



                          ------+| GFR                           



                          (mL/min/1.73 m2) ?|                           



                          With Kidney Damage ?|                           



                          ?Without Kidney                           



                          Damage+---------------                           



                          --------+-------------                           



                          --------+-------------                           



                          ------------+| ?>90 ?                           



                          ? ? ? ? ? ? ? ?|                           



                          ?Stage one ? ? ? ? ?|                           



                          ? Normal ? ? ? ? ? ? ?                           



                          ?+--------------------                           



                          ---+------------------                           



                          ---+------------------                           



                          -------+| ?60-89 ? ? ?                           



                          ? ? ? ? ?| ?Stage two                           



                          ? ? ? ? ?| ? Decreased                           



                          GFR ? ? ? ?                            



                          +---------------------                           



                          --+-------------------                           



                          --+-------------------                           



                          ------+| ?30-59 ? ? ?                           



                          ? ? ? ? ?| ?Stage                           



                          three ? ? ? ?| ? Stage                           



                          three ? ? ? ? ?                           



                          +---------------------                           



                          --+-------------------                           



                          --+-------------------                           



                          ------+| ?15-29 ? ? ?                           



                          ? ? ? ? ?| ?Stage four                           



                          ? ? ? ? | ? Stage four                           



                          ? ? ? ? ?                              



                          ?+--------------------                           



                          ---+------------------                           



                          ---+------------------                           



                          -------+| ?<15 (or                           



                          dialysis) ? ?| ?Stage                           



                          five ? ? ? ? | ? Stage                           



                          five ? ? ? ? ?                           



                          ?+--------------------                           



                          ---+------------------                           



                          ---+------------------                           



                          -------+ *Each stage                           



                          assumes the associated                           



                          GFR level has been in                           



                          effect for at least                           



                          three months. ?Stages                           



                          1 to 5, with or                           



                          without kidney                           



                          disease, indicate                           



                          chronic kidney                           



                          disease. Notes:                           



                          Determination of                           



                          stages one and two                           



                          (with eGFR                             



                          >59mL/min/1.73 m2)                           



                          requires estimation of                           



                          kidney damage for at                           



                          least three months as                           



                          defined by structural                           



                          or functional                           



                          abnormalities of the                           



                          kidney, manifested by                           



                          either:Pathological                           



                          abnormalities or                           



                          Markers of kidney                           



                          damage (including                           



                          abnormalities in the                           



                          composition of the                           



                          blood or urine or                           



                          abnormalities in                           



                          imaging tests).                           

 

             Lab Interpretation Abnormal                               



             (test code = 70539-9)                                        



[facility] WITH [id number] 12:21:36





             Test Item    Value        Reference Range Interpretation Comments

 

             WBC (test code =              See_Comment                [Automated



             8144-2)                                             message] The sy

stem



                                                                 which generated



                                                                 this result



                                                                 transmitted



                                                                 reference range

:



                                                                 4.20 - 10.70



                                                                 10*3/?L. The



                                                                 reference range

 was



                                                                 not used to



                                                                 interpret this



                                                                 result as



                                                                 normal/abnormal

.

 

             RBC (test code =              See_Comment                [Automated



             161-8)                                              message] The sy

stem



                                                                 which generated



                                                                 this result



                                                                 transmitted



                                                                 reference range

:



                                                                 4.26 - 5.52



                                                                 10*6/?L. The



                                                                 reference range

 was



                                                                 not used to



                                                                 interpret this



                                                                 result as



                                                                 normal/abnormal

.

 

             HGB (test code = 15.7 g/dL    12.2-16.4                 



             718-7)                                              

 

             HCT (test code = 48.0 %       38.4-49.3                 



             4544-3)                                             

 

             MCV (test code = 90.1 fL      81.7-95.6                 



             787-2)                                              

 

             MCH (test code = 29.5 pg      26.1-32.7                 



             785-6)                                              

 

             MCHC (test code = 32.7 g/dL    31.2-35.0                 



             786-4)                                              

 

             RDW-SD (test code = 50.6 fL      38.5-51.6                 



             27105-9)                                            

 

             RDW-CV (test code = 15.3 %       12.1-15.4                 



             788-0)                                              

 

             PLT (test code =              See_Comment  H             [Automated



             777-3)                                              message] The sy

stem



                                                                 which generated



                                                                 this result



                                                                 transmitted



                                                                 reference range

:



                                                                 150 - 328 10*3/

?L.



                                                                 The reference r

zhen



                                                                 was not used to



                                                                 interpret this



                                                                 result as



                                                                 normal/abnormal

.

 

             MPV (test code = 10.3 fL      9.8-13.0                  



             10522-9)                                            

 

             NRBC/100 WBC (test              See_Comment                [Automat

ed



             code = 3038294439)                                        message] 

The system



                                                                 which generated



                                                                 this result



                                                                 transmitted



                                                                 reference range

:



                                                                 0.0 - 10.0 /100



                                                                 WBCs. The refer

ence



                                                                 range was not u

sed



                                                                 to interpret th

is



                                                                 result as



                                                                 normal/abnormal

.

 

             NRBC x10^3 (test code <0.01        See_Comment                [Auto

mated



             = [id number])                                        message] The s

ystem



                                                                 which generated



                                                                 this result



                                                                 transmitted



                                                                 reference range

:



                                                                 10*3/?L. The



                                                                 reference range

 was



                                                                 not used to



                                                                 interpret this



                                                                 result as



                                                                 normal/abnormal

.

 

             GRAN MAT (NEUT) % 61.5 %                                 



             (test code = 770-8)                                        

 

             IMM GRAN % (test code 0.80 %                                 



             = [id number])                                        

 

             LYMPH % (test code = 27.8 %                                 



             736-9)                                              

 

             MONO % (test code = 6.9 %                                  



             5905-5)                                             

 

             EOS % (test code = 2.4 %                                  



             713-8)                                              

 

             BASO % (test code = 0.6 %                                  



             706-2)                                              

 

             GRAN MAT x10^3(ANC) 6.07 10*3/uL 1.99-6.95                 



             (test code =                                        



             9076982203)                                         

 

             IMM GRAN x10^3 (test 0.08 10*3/uL 0.00-0.06    H            



             code = 1376934132)                                        

 

             LYMPH x10^3 (test code 2.75 10*3/uL 1.09-3.23                 



             = 731-0)                                            

 

             MONO x10^3 (test code 0.68 10*3/uL 0.36-1.02                 



             = 742-7)                                            

 

             EOS x10^3 (test code = 0.24 10*3/uL 0.06-0.53                 



             711-2)                                              

 

             BASO x10^3 (test code 0.06 10*3/uL 0.01-0.09                 



             = 704-7)                                            

 

             Lab Interpretation Abnormal                               



             (test code = 50608-6)                                        



[facility] Metabolic Panel (NA, K, CL, CO2, 
GLUCOSE, BUN, CREATININE, CA)2022 12:40:30





             Test Item    Value        Reference Range Interpretation Comments

 

             NA (test code = 139 mmol/L   135-145                   



             3434688734)                                         

 

             K (test code = 3.9 mmol/L   3.5-5.0                   



             4613098426)                                         

 

             CL (test code = 108 mmol/L                       



             7343690617)                                         

 

             CO2 TOTAL (test code = 25 mmol/L    23-31                     



             2505418187)                                         

 

             AGAP (test code =              2-16                      



             8760100650)                                         

 

             BUN (test code = 14 mg/dL     7-23                      



             9192348119)                                         

 

             GLUCOSE (test code = 107 mg/dL                        



             7701708681)                                         

 

             CREATININE (test code = 0.61 mg/dL   0.60-1.25                 



             5400527933)                                         

 

             CALCIUM (test code = 8.1 mg/dL    8.6-10.6     L            



             2194433925)                                         

 

             eGFR (test code =              mL/min/1.73m2              



             8686447380)                                         

 

             MAL (test code = MAL) Association of                           



                          Glomerular Filtration                           



                          Rate (GFR) and Staging                           



                          of Kidney Disease*                           



                          +---------------------                           



                          --+-------------------                           



                          --+-------------------                           



                          ------+| GFR                           



                          (mL/min/1.73 m2) ?|                           



                          With Kidney Damage ?|                           



                          ?Without Kidney                           



                          Damage+---------------                           



                          --------+-------------                           



                          --------+-------------                           



                          ------------+| ?>90 ?                           



                          ? ? ? ? ? ? ? ?|                           



                          ?Stage one ? ? ? ? ?|                           



                          ? Normal ? ? ? ? ? ? ?                           



                          ?+--------------------                           



                          ---+------------------                           



                          ---+------------------                           



                          -------+| ?60-89 ? ? ?                           



                          ? ? ? ? ?| ?Stage two                           



                          ? ? ? ? ?| ? Decreased                           



                          GFR ? ? ? ?                            



                          +---------------------                           



                          --+-------------------                           



                          --+-------------------                           



                          ------+| ?30-59 ? ? ?                           



                          ? ? ? ? ?| ?Stage                           



                          three ? ? ? ?| ? Stage                           



                          three ? ? ? ? ?                           



                          +---------------------                           



                          --+-------------------                           



                          --+-------------------                           



                          ------+| ?15-29 ? ? ?                           



                          ? ? ? ? ?| ?Stage four                           



                          ? ? ? ? | ? Stage four                           



                          ? ? ? ? ?                              



                          ?+--------------------                           



                          ---+------------------                           



                          ---+------------------                           



                          -------+| ?<15 (or                           



                          dialysis) ? ?| ?Stage                           



                          five ? ? ? ? | ? Stage                           



                          five ? ? ? ? ?                           



                          ?+--------------------                           



                          ---+------------------                           



                          ---+------------------                           



                          -------+ *Each stage                           



                          assumes the associated                           



                          GFR level has been in                           



                          effect for at least                           



                          three months. ?Stages                           



                          1 to 5, with or                           



                          without kidney                           



                          disease, indicate                           



                          chronic kidney                           



                          disease. Notes:                           



                          Determination of                           



                          stages one and two                           



                          (with eGFR                             



                          >59mL/min/1.73 m2)                           



                          requires estimation of                           



                          kidney damage for at                           



                          least three months as                           



                          defined by structural                           



                          or functional                           



                          abnormalities of the                           



                          kidney, manifested by                           



                          either:Pathological                           



                          abnormalities or                           



                          Markers of kidney                           



                          damage (including                           



                          abnormalities in the                           



                          composition of the                           



                          blood or urine or                           



                          abnormalities in                           



                          imaging tests).                           

 

             Lab Interpretation Abnormal                               



             (test code = 43155-5)                                        



[facility] with [id number] 12:23:51





             Test Item    Value        Reference Range Interpretation Comments

 

             WBC (test code =              See_Comment                [Automated



             7224-2)                                             message] The sy

stem



                                                                 which generated



                                                                 this result



                                                                 transmitted



                                                                 reference range

:



                                                                 4.20 - 10.70



                                                                 10*3/?L. The



                                                                 reference range

 was



                                                                 not used to



                                                                 interpret this



                                                                 result as



                                                                 normal/abnormal

.

 

             RBC (test code =              See_Comment                [Automated



             589-8)                                              message] The sy

stem



                                                                 which generated



                                                                 this result



                                                                 transmitted



                                                                 reference range

:



                                                                 4.26 - 5.52



                                                                 10*6/?L. The



                                                                 reference range

 was



                                                                 not used to



                                                                 interpret this



                                                                 result as



                                                                 normal/abnormal

.

 

             HGB (test code = 14.9 g/dL    12.2-16.4                 



             718-7)                                              

 

             HCT (test code = 44.2 %       38.4-49.3                 



             4544-3)                                             

 

             MCV (test code = 88.4 fL      81.7-95.6                 



             787-2)                                              

 

             MCH (test code = 29.8 pg      26.1-32.7                 



             785-6)                                              

 

             MCHC (test code = 33.7 g/dL    31.2-35.0                 



             786-4)                                              

 

             RDW-SD (test code = 49.3 fL      38.5-51.6                 



             29181-8)                                            

 

             RDW-CV (test code = 15.3 %       12.1-15.4                 



             788-0)                                              

 

             PLT (test code =              See_Comment  H             [Automated



             797-3)                                              message] The sy

stem



                                                                 which generated



                                                                 this result



                                                                 transmitted



                                                                 reference range

:



                                                                 150 - 328 10*3/

?L.



                                                                 The reference r

zhen



                                                                 was not used to



                                                                 interpret this



                                                                 result as



                                                                 normal/abnormal

.

 

             MPV (test code = 10.2 fL      9.8-13.0                  



             52438-8)                                            

 

             NRBC/100 WBC (test              See_Comment                [Automat

ed



             code = 3212696334)                                        message] 

The system



                                                                 which generated



                                                                 this result



                                                                 transmitted



                                                                 reference range

:



                                                                 0.0 - 10.0 /100



                                                                 WBCs. The refer

ence



                                                                 range was not u

sed



                                                                 to interpret th

is



                                                                 result as



                                                                 normal/abnormal

.

 

             NRBC x10^3 (test code <0.01        See_Comment                [Auto

mated



             = [id number])                                        message] The s

ystem



                                                                 which generated



                                                                 this result



                                                                 transmitted



                                                                 reference range

:



                                                                 10*3/?L. The



                                                                 reference range

 was



                                                                 not used to



                                                                 interpret this



                                                                 result as



                                                                 normal/abnormal

.

 

             GRAN MAT (NEUT) % 56.7 %                                 



             (test code = 770-8)                                        

 

             IMM GRAN % (test code 0.60 %                                 



             = 7436574393)                                        

 

             LYMPH % (test code = 31.1 %                                 



             736-9)                                              

 

             MONO % (test code = 8.7 %                                  



             5905-5)                                             

 

             EOS % (test code = 2.4 %                                  



             713-8)                                              

 

             BASO % (test code = 0.5 %                                  



             706-2)                                              

 

             GRAN MAT x10^3(ANC) 4.83 10*3/uL 1.99-6.95                 



             (test code =                                        



             4826044726)                                         

 

             IMM GRAN x10^3 (test 0.05 10*3/uL 0.00-0.06                 



             code = 5295788723)                                        

 

             LYMPH x10^3 (test code 2.64 10*3/uL 1.09-3.23                 



             = 731-0)                                            

 

             MONO x10^3 (test code 0.74 10*3/uL 0.36-1.02                 



             = 742-7)                                            

 

             EOS x10^3 (test code = 0.20 10*3/uL 0.06-0.53                 



             711-2)                                              

 

             BASO x10^3 (test code 0.04 10*3/uL 0.01-0.09                 



             = 704-7)                                            

 

             Lab Interpretation Abnormal                               



             (test code = 64848-8)                                        



[facility]. METABOLIC PANEL ([id number] 
06:32:14





             Test Item    Value        Reference Range Interpretation Comments

 

             NA (test code = 139 mmol/L   135-145                   



             8423158916)                                         

 

             K (test code = 4.5 mmol/L   3.5-5.0                   



             1395187575)                                         

 

             CL (test code = 102 mmol/L                       



             1144013313)                                         

 

             CO2 TOTAL (test code = 26 mmol/L    23-31                     



             9324215787)                                         

 

             AGAP (test code =              2-16                      



             6295988048)                                         

 

             BUN (test code = 16 mg/dL     7-23                      



             7471944227)                                         

 

             GLUCOSE (test code = 99 mg/dL                         



             2398918742)                                         

 

             CREATININE (test code = 0.83 mg/dL   0.60-1.25                 



             8349077346)                                         

 

             TOTAL BILI (test code = 0.6 mg/dL    0.1-1.1                   



             1647527972)                                         

 

             CALCIUM (test code = 9.5 mg/dL    8.6-10.6                  



             4961106579)                                         

 

             T PROTEIN (test code = 8.6 g/dL     6.3-8.2      H            



             9620359902)                                         

 

             ALBUMIN (test code = 4.6 g/dL     3.5-5.0                   



             3400931546)                                         

 

             ALK PHOS (test code = 121 U/L                          



             5454002338)                                         

 

             ALTv (test code = 58 U/L       5-50         H            



             2-6)                                             

 

             AST(SGOT) (test code = 32 U/L       13-40                     



             5400584667)                                         

 

             eGFR (test code =              mL/min/1.73m2              



             5760401445)                                         

 

             MAL (test code = MAL) Association of                           



                          Glomerular Filtration                           



                          Rate (GFR) and Staging                           



                          of Kidney Disease*                           



                          +---------------------                           



                          --+-------------------                           



                          --+-------------------                           



                          ------+| GFR                           



                          (mL/min/1.73 m2) ?|                           



                          With Kidney Damage ?|                           



                          ?Without Kidney                           



                          Damage+---------------                           



                          --------+-------------                           



                          --------+-------------                           



                          ------------+| ?>90 ?                           



                          ? ? ? ? ? ? ? ?|                           



                          ?Stage one ? ? ? ? ?|                           



                          ? Normal ? ? ? ? ? ? ?                           



                          ?+--------------------                           



                          ---+------------------                           



                          ---+------------------                           



                          -------+| ?60-89 ? ? ?                           



                          ? ? ? ? ?| ?Stage two                           



                          ? ? ? ? ?| ? Decreased                           



                          GFR ? ? ? ?                            



                          +---------------------                           



                          --+-------------------                           



                          --+-------------------                           



                          ------+| ?30-59 ? ? ?                           



                          ? ? ? ? ?| ?Stage                           



                          three ? ? ? ?| ? Stage                           



                          three ? ? ? ? ?                           



                          +---------------------                           



                          --+-------------------                           



                          --+-------------------                           



                          ------+| ?15-29 ? ? ?                           



                          ? ? ? ? ?| ?Stage four                           



                          ? ? ? ? | ? Stage four                           



                          ? ? ? ? ?                              



                          ?+--------------------                           



                          ---+------------------                           



                          ---+------------------                           



                          -------+| ?<15 (or                           



                          dialysis) ? ?| ?Stage                           



                          five ? ? ? ? | ? Stage                           



                          five ? ? ? ? ?                           



                          ?+--------------------                           



                          ---+------------------                           



                          ---+------------------                           



                          -------+ *Each stage                           



                          assumes the associated                           



                          GFR level has been in                           



                          effect for at least                           



                          three months. ?Stages                           



                          1 to 5, with or                           



                          without kidney                           



                          disease, indicate                           



                          chronic kidney                           



                          disease. Notes:                           



                          Determination of                           



                          stages one and two                           



                          (with eGFR                             



                          >59mL/min/1.73 m2)                           



                          requires estimation of                           



                          kidney damage for at                           



                          least three months as                           



                          defined by structural                           



                          or functional                           



                          abnormalities of the                           



                          kidney, manifested by                           



                          either:Pathological                           



                          abnormalities or                           



                          Markers of kidney                           



                          damage (including                           



                          abnormalities in the                           



                          composition of the                           



                          blood or urine or                           



                          abnormalities in                           



                          imaging tests).                           

 

             Lab Interpretation Abnormal                               



             (test code = 08582-3)                                        



[facility] WITH [id number] 05:48:31





             Test Item    Value        Reference Range Interpretation Comments

 

             WBC (test code =              See_Comment  H             [Automated



             6690-2)                                             message] The sy

stem



                                                                 which generated



                                                                 this result



                                                                 transmitted



                                                                 reference range

:



                                                                 4.20 - 10.70



                                                                 10*3/?L. The



                                                                 reference range

 was



                                                                 not used to



                                                                 interpret this



                                                                 result as



                                                                 normal/abnormal

.

 

             RBC (test code =              See_Comment  H             [Automated



             789-8)                                              message] The sy

stem



                                                                 which generated



                                                                 this result



                                                                 transmitted



                                                                 reference range

:



                                                                 4.26 - 5.52



                                                                 10*6/?L. The



                                                                 reference range

 was



                                                                 not used to



                                                                 interpret this



                                                                 result as



                                                                 normal/abnormal

.

 

             HGB (test code = 17.3 g/dL    12.2-16.4    H            



             718-7)                                              

 

             HCT (test code = 51.4 %       38.4-49.3    H            



             4544-3)                                             

 

             MCV (test code = 87.7 fL      81.7-95.6                 



             787-2)                                              

 

             MCH (test code = 29.5 pg      26.1-32.7                 



             785-6)                                              

 

             MCHC (test code = 33.7 g/dL    31.2-35.0                 



             786-4)                                              

 

             RDW-SD (test code = 49.1 fL      38.5-51.6                 



             53690-4)                                            

 

             RDW-CV (test code = 15.5 %       12.1-15.4    H            



             788-0)                                              

 

             PLT (test code =              See_Comment  H             [Automated



             777-3)                                              message] The sy

stem



                                                                 which generated



                                                                 this result



                                                                 transmitted



                                                                 reference range

:



                                                                 150 - 328 10*3/

?L.



                                                                 The reference r

zhen



                                                                 was not used to



                                                                 interpret this



                                                                 result as



                                                                 normal/abnormal

.

 

             MPV (test code = 10.4 fL      9.8-13.0                  



             22941-8)                                            

 

             NRBC/100 WBC (test              See_Comment                [Automat

ed



             code = 5427915414)                                        message] 

The system



                                                                 which generated



                                                                 this result



                                                                 transmitted



                                                                 reference range

:



                                                                 0.0 - 10.0 /100



                                                                 WBCs. The refer

ence



                                                                 range was not u

sed



                                                                 to interpret th

is



                                                                 result as



                                                                 normal/abnormal

.

 

             NRBC x10^3 (test code <0.01        See_Comment                [Auto

mated



             = 9033567911)                                        message] The s

ystem



                                                                 which generated



                                                                 this result



                                                                 transmitted



                                                                 reference range

:



                                                                 10*3/?L. The



                                                                 reference range

 was



                                                                 not used to



                                                                 interpret this



                                                                 result as



                                                                 normal/abnormal

.

 

             GRAN MAT (NEUT) % 64.8 %                                 



             (test code = 770-8)                                        

 

             IMM GRAN % (test code 0.70 %                                 



             = 9062289879)                                        

 

             LYMPH % (test code = 25.2 %                                 



             736-9)                                              

 

             MONO % (test code = 6.9 %                                  



             5905-5)                                             

 

             EOS % (test code = 1.9 %                                  



             713-8)                                              

 

             BASO % (test code = 0.5 %                                  



             706-2)                                              

 

             GRAN MAT x10^3(ANC) 7.80 10*3/uL 1.99-6.95    H            



             (test code =                                        



             1504515430)                                         

 

             IMM GRAN x10^3 (test 0.08 10*3/uL 0.00-0.06    H            



             code = 5203661741)                                        

 

             LYMPH x10^3 (test code 3.04 10*3/uL 1.09-3.23                 



             = 731-0)                                            

 

             MONO x10^3 (test code 0.83 10*3/uL 0.36-1.02                 



             = 742-7)                                            

 

             EOS x10^3 (test code = 0.23 10*3/uL 0.06-0.53                 



             711-2)                                              

 

             BASO x10^3 (test code 0.06 10*3/uL 0.01-0.09                 



             = 704-7)                                            

 

             Lab Interpretation Abnormal                               



             (test code = 56810-1)                                        



[id number] 
[facility]





             Test Item    Value        Reference Range Interpretation Comments

 

             NA (test code = 136 mmol/L   135-145                   



             0644241761)                                         

 

             K (test code = 4.4 mmol/L   3.5-5.0                   



             6076550868)                                         

 

             CL (test code = 99 mmol/L                        



             4505244419)                                         

 

             CO2 TOTAL (test code = 25 mmol/L    23-31                     



             6091679947)                                         

 

             AGAP (test code =              2-16                      



             8753373862)                                         

 

             BUN (test code = 19 mg/dL     7-23                      



             8356056550)                                         

 

             GLUCOSE (test code = 103 mg/dL                        



             3075593394)                                         

 

             CREATININE (test code = 0.70 mg/dL   0.60-1.25                 



             2148772173)                                         

 

             TOTAL BILI (test code = 0.7 mg/dL    0.1-1.1                   



             4603491964)                                         

 

             CALCIUM (test code = 9.2 mg/dL    8.6-10.6                  



             5556069467)                                         

 

             T PROTEIN (test code = 9.4 g/dL     6.3-8.2      H            



             1981574702)                                         

 

             ALBUMIN (test code = 4.8 g/dL     3.5-5.0                   



             3519531088)                                         

 

             ALK PHOS (test code = 146 U/L             H            



             7740103281)                                         

 

             ALTv (test code = 75 U/L       5-50         H            



             1742-6)                                             

 

             AST(SGOT) (test code = 37 U/L       13-40                     



             7715076159)                                         

 

             eGFR (test code =              mL/min/1.73m2              



             6744378904)                                         

 

             MAL (test code = MAL) Association of                           



                          Glomerular Filtration                           



                          Rate (GFR) and Staging                           



                          of Kidney Disease*                           



                          +---------------------                           



                          --+-------------------                           



                          --+-------------------                           



                          ------+| GFR                           



                          (mL/min/1.73 m2) ?|                           



                          With Kidney Damage ?|                           



                          ?Without Kidney                           



                          Damage+---------------                           



                          --------+-------------                           



                          --------+-------------                           



                          ------------+| ?>90 ?                           



                          ? ? ? ? ? ? ? ?|                           



                          ?Stage one ? ? ? ? ?|                           



                          ? Normal ? ? ? ? ? ? ?                           



                          ?+--------------------                           



                          ---+------------------                           



                          ---+------------------                           



                          -------+| ?60-89 ? ? ?                           



                          ? ? ? ? ?| ?Stage two                           



                          ? ? ? ? ?| ? Decreased                           



                          GFR ? ? ? ?                            



                          +---------------------                           



                          --+-------------------                           



                          --+-------------------                           



                          ------+| ?30-59 ? ? ?                           



                          ? ? ? ? ?| ?Stage                           



                          three ? ? ? ?| ? Stage                           



                          three ? ? ? ? ?                           



                          +---------------------                           



                          --+-------------------                           



                          --+-------------------                           



                          ------+| ?15-29 ? ? ?                           



                          ? ? ? ? ?| ?Stage four                           



                          ? ? ? ? | ? Stage four                           



                          ? ? ? ? ?                              



                          ?+--------------------                           



                          ---+------------------                           



                          ---+------------------                           



                          -------+| ?<15 (or                           



                          dialysis) ? ?| ?Stage                           



                          five ? ? ? ? | ? Stage                           



                          five ? ? ? ? ?                           



                          ?+--------------------                           



                          ---+------------------                           



                          ---+------------------                           



                          -------+ *Each stage                           



                          assumes the associated                           



                          GFR level has been in                           



                          effect for at least                           



                          three months. ?Stages                           



                          1 to 5, with or                           



                          without kidney                           



                          disease, indicate                           



                          chronic kidney                           



                          disease. Notes:                           



                          Determination of                           



                          stages one and two                           



                          (with eGFR                             



                          >59mL/min/1.73 m2)                           



                          requires estimation of                           



                          kidney damage for at                           



                          least three months as                           



                          defined by structural                           



                          or functional                           



                          abnormalities of the                           



                          kidney, manifested by                           



                          either:Pathological                           



                          abnormalities or                           



                          Markers of kidney                           



                          damage (including                           



                          abnormalities in the                           



                          composition of the                           



                          blood or urine or                           



                          abnormalities in                           



                          imaging tests).                           

 

             Lab Interpretation Abnormal                               



             (test code = 57417-3)                                        



[facility]





             Test Item    Value        Reference Range Interpretation Comments

 

             LIPASE (test code = 4192242879) 86 U/L       0-220                 

    

 

             Lab Interpretation (test code = Normal                             

    



             14210-6)                                            



[id number]55[facility]49





             Test Item    Value        Reference Range Interpretation Comments

 

             WBC (test code =              See_Comment  H             [Automated



             6690-2)                                             message] The sy

stem



                                                                 which generated



                                                                 this result



                                                                 transmitted



                                                                 reference range

:



                                                                 4.20 - 10.70



                                                                 10*3/?L. The



                                                                 reference range

 was



                                                                 not used to



                                                                 interpret this



                                                                 result as



                                                                 normal/abnormal

.

 

             RBC (test code =              See_Comment  H             [Automated



             789-8)                                              message] The sy

stem



                                                                 which generated



                                                                 this result



                                                                 transmitted



                                                                 reference range

:



                                                                 4.26 - 5.52



                                                                 10*6/?L. The



                                                                 reference range

 was



                                                                 not used to



                                                                 interpret this



                                                                 result as



                                                                 normal/abnormal

.

 

             HGB (test code = 18.0 g/dL    12.2-16.4    H            



             718-7)                                              

 

             HCT (test code = 53.9 %       38.4-49.3    H            



             4544-3)                                             

 

             MCV (test code = 87.2 fL      81.7-95.6                 



             787-2)                                              

 

             MCH (test code = 29.1 pg      26.1-32.7                 



             785-6)                                              

 

             MCHC (test code = 33.4 g/dL    31.2-35.0                 



             786-4)                                              

 

             RDW-SD (test code = 48.7 fL      38.5-51.6                 



             06514-9)                                            

 

             RDW-CV (test code = 15.4 %       12.1-15.4                 



             788-0)                                              

 

             PLT (test code =              See_Comment  H             [Automated



             777-3)                                              message] The sy

stem



                                                                 which generated



                                                                 this result



                                                                 transmitted



                                                                 reference range

:



                                                                 150 - 328 10*3/

?L.



                                                                 The reference r

zhen



                                                                 was not used to



                                                                 interpret this



                                                                 result as



                                                                 normal/abnormal

.

 

             MPV (test code = 9.9 fL       9.8-13.0                  



             58756-0)                                            

 

             NRBC/100 WBC (test              See_Comment                [Automat

ed



             code = 7377805222)                                        message] 

The system



                                                                 which generated



                                                                 this result



                                                                 transmitted



                                                                 reference range

:



                                                                 0.0 - 10.0 /100



                                                                 WBCs. The refer

ence



                                                                 range was not u

sed



                                                                 to interpret th

is



                                                                 result as



                                                                 normal/abnormal

.

 

             NRBC x10^3 (test code <0.01        See_Comment                [Auto

mated



             = 0179159964)                                        message] The s

ystem



                                                                 which generated



                                                                 this result



                                                                 transmitted



                                                                 reference range

:



                                                                 10*3/?L. The



                                                                 reference range

 was



                                                                 not used to



                                                                 interpret this



                                                                 result as



                                                                 normal/abnormal

.

 

             GRAN MAT (NEUT) % 69.8 %                                 



             (test code = 770-8)                                        

 

             IMM GRAN % (test code 0.50 %                                 



             = 5600544364)                                        

 

             LYMPH % (test code = 20.5 %                                 



             736-9)                                              

 

             MONO % (test code = 6.8 %                                  



             5905-5)                                             

 

             EOS % (test code = 1.9 %                                  



             713-8)                                              

 

             BASO % (test code = 0.5 %                                  



             706-2)                                              

 

             GRAN MAT x10^3(ANC) 7.72 10*3/uL 1.99-6.95    H            



             (test code =                                        



             5407830394)                                         

 

             IMM GRAN x10^3 (test 0.05 10*3/uL 0.00-0.06                 



             code = 7247825721)                                        

 

             LYMPH x10^3 (test code 2.26 10*3/uL 1.09-3.23                 



             = 731-0)                                            

 

             MONO x10^3 (test code 0.75 10*3/uL 0.36-1.02                 



             = 742-7)                                            

 

             EOS x10^3 (test code = 0.21 10*3/uL 0.06-0.53                 



             711-2)                                              

 

             BASO x10^3 (test code 0.06 10*3/uL 0.01-0.09                 



             = 704-7)                                            

 

             Lab Interpretation Abnormal                               



             (test code = 72252-5)                                        



[id number]52





             Test Item    Value        Reference    Interpretation Comments



                                       Range                     

 

             D-DIMER (test code =              See_Comment                [Autom

ated



             7769974929)                                         message] The



                                                                 system which



                                                                 generated this



                                                                 result



                                                                 transmitted



                                                                 reference range

:



                                                                 <0.41 ?g/mL



                                                                 (FEU). The



                                                                 reference range



                                                                 was not used to



                                                                 interpret this



                                                                 result as



                                                                 normal/abnormal

.

 

             MAL (test code = This test may be                           



             MAL)         used in conjunction                           



                          with a clinical                           



                          pretest probability                           



                          (PTP) assessment                           



                          model to exclude                           



                          venous                                 



                          thromboembolism                           



                          (VTE) in patients                           



                          suspected of deep                           



                          venous thrombosis                           



                          (DVT) and pulmonary                           



                          embolism (PE) A                           



                          D-Dimer value less                           



                          than 0.50 ?g/ml                           



                          (FEU) has a negative                           



                          predicative value of                           



                          96 to 100% (95%                           



                          CI)and 97 to 100%                           



                          (95% CI) as an aid                           



                          in the diagnosis of                           



                          deep vein thrombosis                           



                          (DVT) and pulmonary                           



                          embolism when there                           



                          is low or moderate                           



                          pretest probability                           



                          of PE or DVT.                           



                          D-Dimer values are                           



                          expressed in initial                           



                          fibrinogen                             



                          equivalent units                           



                          (FEU)" The assay                           



                          results should be                           



                          used with other                           



                          information,                           



                          including the                           



                          clinical context, in                           



                          forming a diagnosis.                           

 

             Lab Interpretation Normal                                 



             (test code =                                        



             09205-0)                                            



[facility]. METABOLIC PANEL ([id number])2022-01-15 
22:42:48





             Test Item    Value        Reference Range Interpretation Comments

 

             NA (test code = 135 mmol/L   135-145                   



             8395839024)                                         

 

             K (test code = 4.6 mmol/L   3.5-5.0                   



             8754242881)                                         

 

             CL (test code = 102 mmol/L                       



             3999748305)                                         

 

             CO2 TOTAL (test code = 24 mmol/L    23-31                     



             4752897562)                                         

 

             AGAP (test code =              2-16                      



             8185202781)                                         

 

             BUN (test code = 17 mg/dL     7-23                      



             8625346140)                                         

 

             GLUCOSE (test code = 107 mg/dL                        



             5765422299)                                         

 

             CREATININE (test code = 0.60 mg/dL   0.60-1.25                 



             4425148400)                                         

 

             TOTAL BILI (test code = 1.0 mg/dL    0.1-1.1                   



             3246490550)                                         

 

             CALCIUM (test code = 9.4 mg/dL    8.6-10.6                  



             0615675842)                                         

 

             T PROTEIN (test code = 8.6 g/dL     6.3-8.2      H            



             2564048824)                                         

 

             ALBUMIN (test code = 4.6 g/dL     3.5-5.0                   



             9617348986)                                         

 

             ALK PHOS (test code = 159 U/L             H            



             6297892939)                                         

 

             ALTv (test code = 77 U/L       5-50         H            



             1742-6)                                             

 

             AST(SGOT) (test code = 38 U/L       13-40                     



             2685192082)                                         

 

             eGFR (test code =              mL/min/1.73m2              



             3219140460)                                         

 

             MAL (test code = MAL) Association of                           



                          Glomerular Filtration                           



                          Rate (GFR) and Staging                           



                          of Kidney Disease*                           



                          +---------------------                           



                          --+-------------------                           



                          --+-------------------                           



                          ------+| GFR                           



                          (mL/min/1.73 m2) ?|                           



                          With Kidney Damage ?|                           



                          ?Without Kidney                           



                          Damage+---------------                           



                          --------+-------------                           



                          --------+-------------                           



                          ------------+| ?>90 ?                           



                          ? ? ? ? ? ? ? ?|                           



                          ?Stage one ? ? ? ? ?|                           



                          ? Normal ? ? ? ? ? ? ?                           



                          ?+--------------------                           



                          ---+------------------                           



                          ---+------------------                           



                          -------+| ?60-89 ? ? ?                           



                          ? ? ? ? ?| ?Stage two                           



                          ? ? ? ? ?| ? Decreased                           



                          GFR ? ? ? ?                            



                          +---------------------                           



                          --+-------------------                           



                          --+-------------------                           



                          ------+| ?30-59 ? ? ?                           



                          ? ? ? ? ?| ?Stage                           



                          three ? ? ? ?| ? Stage                           



                          three ? ? ? ? ?                           



                          +---------------------                           



                          --+-------------------                           



                          --+-------------------                           



                          ------+| ?15-29 ? ? ?                           



                          ? ? ? ? ?| ?Stage four                           



                          ? ? ? ? | ? Stage four                           



                          ? ? ? ? ?                              



                          ?+--------------------                           



                          ---+------------------                           



                          ---+------------------                           



                          -------+| ?<15 (or                           



                          dialysis) ? ?| ?Stage                           



                          five ? ? ? ? | ? Stage                           



                          five ? ? ? ? ?                           



                          ?+--------------------                           



                          ---+------------------                           



                          ---+------------------                           



                          -------+ *Each stage                           



                          assumes the associated                           



                          GFR level has been in                           



                          effect for at least                           



                          three months. ?Stages                           



                          1 to 5, with or                           



                          without kidney                           



                          disease, indicate                           



                          chronic kidney                           



                          disease. Notes:                           



                          Determination of                           



                          stages one and two                           



                          (with eGFR                             



                          >59mL/min/1.73 m2)                           



                          requires estimation of                           



                          kidney damage for at                           



                          least three months as                           



                          defined by structural                           



                          or functional                           



                          abnormalities of the                           



                          kidney, manifested by                           



                          either:Pathological                           



                          abnormalities or                           



                          Markers of kidney                           



                          damage (including                           



                          abnormalities in the                           



                          composition of the                           



                          blood or urine or                           



                          abnormalities in                           



                          imaging tests).                           

 

             Lab Interpretation Abnormal                               



             (test code = 53278-1)                                        



[facility] WITH DIFF2022-01-15 22:30:27





             Test Item    Value        Reference Range Interpretation Comments

 

             WBC (test code =              See_Comment  H             [Automated



             7596-2)                                             message] The



                                                                 system which



                                                                 generated this



                                                                 result transmit

huma



                                                                 reference range

:



                                                                 4.20 - 10.70



                                                                 10*3/?L. The



                                                                 reference range



                                                                 was not used to



                                                                 interpret this



                                                                 result as



                                                                 normal/abnormal

.

 

             RBC (test code =              See_Comment  H             [Automated



             814-8)                                              message] The



                                                                 system which



                                                                 generated this



                                                                 result transmit

huma



                                                                 reference range

:



                                                                 4.26 - 5.52



                                                                 10*6/?L. The



                                                                 reference range



                                                                 was not used to



                                                                 interpret this



                                                                 result as



                                                                 normal/abnormal

.

 

             HGB (test code = 17.5 g/dL    12.2-16.4    H            



             718-7)                                              

 

             HCT (test code = 51.0 %       38.4-49.3    H            



             4544-3)                                             

 

             MCV (test code = 86.7 fL      81.7-95.6                 



             787-2)                                              

 

             MCH (test code = 29.8 pg      26.1-32.7                 



             785-6)                                              

 

             MCHC (test code = 34.3 g/dL    31.2-35.0                 



             786-4)                                              

 

             RDW-SD (test code = 48.0 fL      38.5-51.6                 



             51579-6)                                            

 

             RDW-CV (test code = 15.1 %       12.1-15.4                 



             788-0)                                              

 

             PLT (test code =              See_Comment  H             [Automated



             777-3)                                              message] The



                                                                 system which



                                                                 generated this



                                                                 result transmit

huma



                                                                 reference range

:



                                                                 150 - 328 10*3/

?L.



                                                                 The reference



                                                                 range was not u

sed



                                                                 to interpret th

is



                                                                 result as



                                                                 normal/abnormal

.

 

             MPV (test code = 10.3 fL      9.8-13.0                  



             10687-9)                                            

 

             NRBC/100 WBC (test              See_Comment                [Automat

ed



             code = 9140762923)                                        message] 

The



                                                                 system which



                                                                 generated this



                                                                 result transmit

huma



                                                                 reference range

:



                                                                 0.0 - 10.0 /100



                                                                 WBCs. The



                                                                 reference range



                                                                 was not used to



                                                                 interpret this



                                                                 result as



                                                                 normal/abnormal

.

 

             NRBC x10^3 (test code <0.01        See_Comment                [Auto

mated



             = [id number])                                        message] The



                                                                 system which



                                                                 generated this



                                                                 result transmit

huma



                                                                 reference range

:



                                                                 10*3/?L. The



                                                                 reference range



                                                                 was not used to



                                                                 interpret this



                                                                 result as



                                                                 normal/abnormal

.

 

             GRAN MAT (NEUT) % 79.9 %                                 



             (test code = 770-8)                                        

 

             IMM GRAN % (test code 0.50 %                                 



             = 8271884180)                                        

 

             LYMPH % (test code = 11.8 %                                 



             736-9)                                              

 

             MONO % (test code = 6.6 %                                  



             5905-5)                                             

 

             EOS % (test code = 0.9 %                                  



             713-8)                                              

 

             BASO % (test code = 0.3 %                                  



             706-2)                                              

 

             GRAN MAT x10^3(ANC) 10.70 10*3/uL 1.99-6.95    H            



             (test code =                                        



             8756502553)                                         

 

             IMM GRAN x10^3 (test 0.07 10*3/uL 0.00-0.06    H            



             code = 8693065503)                                        

 

             LYMPH x10^3 (test code 1.58 10*3/uL 1.09-3.23                 



             = 731-0)                                            

 

             MONO x10^3 (test code 0.89 10*3/uL 0.36-1.02                 



             = 742-7)                                            

 

             EOS x10^3 (test code = 0.12 10*3/uL 0.06-0.53                 



             711-2)                                              

 

             BASO x10^3 (test code 0.04 10*3/uL 0.01-0.09                 



             = 704-7)                                            

 

             Lab Interpretation Abnormal                               



             (test code = 61678-1)                                        



[facility]. METABOLIC PANEL [id number] 
23:02:15





             Test Item    Value        Reference Range Interpretation Comments

 

             NA (test code = 137 mmol/L   135-145                   



             5314914314)                                         

 

             K (test code = 4.5 mmol/L   3.5-5.0                   



             3504760165)                                         

 

             CL (test code = 109 mmol/L          H            



             0650123067)                                         

 

             CO2 TOTAL (test code = 22 mmol/L    23-31        L            



             6996260241)                                         

 

             AGAP (test code =              2-16                      



             5651531428)                                         

 

             BUN (test code = 7 mg/dL      7-23                      



             2079951007)                                         

 

             GLUCOSE (test code = 87 mg/dL                         



             3728694582)                                         

 

             CREATININE (test code = 0.61 mg/dL   0.60-1.25                 



             9773820654)                                         

 

             TOTAL BILI (test code = 0.5 mg/dL    0.1-1.1                   



             7069903982)                                         

 

             CALCIUM (test code = 9.0 mg/dL    8.6-10.6                  



             6372224494)                                         

 

             T PROTEIN (test code = 6.9 g/dL     6.3-8.2                   



             3936214491)                                         

 

             ALBUMIN (test code = 3.5 g/dL     3.5-5.0                   



             3404833270)                                         

 

             ALK PHOS (test code = 112 U/L                          



             5774521479)                                         

 

             ALTv (test code = 35 U/L       5-50                      



             1742-6)                                             

 

             AST(SGOT) (test code = 21 U/L       13-40                     



             5105882394)                                         

 

             eGFR (test code =              mL/min/1.73m2              



             7215661751)                                         

 

             MAL (test code = MAL) Association of                           



                          Glomerular Filtration                           



                          Rate (GFR) and Staging                           



                          of Kidney Disease*                           



                          +---------------------                           



                          --+-------------------                           



                          --+-------------------                           



                          ------+| GFR                           



                          (mL/min/1.73 m2) ?|                           



                          With Kidney Damage ?|                           



                          ?Without Kidney                           



                          Damage+---------------                           



                          --------+-------------                           



                          --------+-------------                           



                          ------------+| ?>90 ?                           



                          ? ? ? ? ? ? ? ?|                           



                          ?Stage one ? ? ? ? ?|                           



                          ? Normal ? ? ? ? ? ? ?                           



                          ?+--------------------                           



                          ---+------------------                           



                          ---+------------------                           



                          -------+| ?60-89 ? ? ?                           



                          ? ? ? ? ?| ?Stage two                           



                          ? ? ? ? ?| ? Decreased                           



                          GFR ? ? ? ?                            



                          +---------------------                           



                          --+-------------------                           



                          --+-------------------                           



                          ------+| ?30-59 ? ? ?                           



                          ? ? ? ? ?| ?Stage                           



                          three ? ? ? ?| ? Stage                           



                          three ? ? ? ? ?                           



                          +---------------------                           



                          --+-------------------                           



                          --+-------------------                           



                          ------+| ?15-29 ? ? ?                           



                          ? ? ? ? ?| ?Stage four                           



                          ? ? ? ? | ? Stage four                           



                          ? ? ? ? ?                              



                          ?+--------------------                           



                          ---+------------------                           



                          ---+------------------                           



                          -------+| ?<15 (or                           



                          dialysis) ? ?| ?Stage                           



                          five ? ? ? ? | ? Stage                           



                          five ? ? ? ? ?                           



                          ?+--------------------                           



                          ---+------------------                           



                          ---+------------------                           



                          -------+ *Each stage                           



                          assumes the associated                           



                          GFR level has been in                           



                          effect for at least                           



                          three months. ?Stages                           



                          1 to 5, with or                           



                          without kidney                           



                          disease, indicate                           



                          chronic kidney                           



                          disease. Notes:                           



                          Determination of                           



                          stages one and two                           



                          (with eGFR                             



                          >59mL/min/1.73 m2)                           



                          requires estimation of                           



                          kidney damage for at                           



                          least three months as                           



                          defined by structural                           



                          or functional                           



                          abnormalities of the                           



                          kidney, manifested by                           



                          either:Pathological                           



                          abnormalities or                           



                          Markers of kidney                           



                          damage (including                           



                          abnormalities in the                           



                          composition of the                           



                          blood or urine or                           



                          abnormalities in                           



                          imaging tests).                           

 

             Lab Interpretation Abnormal                               



             (test code = 51071-9)                                        



[facility] WITH [id number] 22:51:57





             Test Item    Value        Reference Range Interpretation Comments

 

             WBC (test code =              See_Comment  H             [Automated



             9371-2)                                             message] The sy

stem



                                                                 which generated



                                                                 this result



                                                                 transmitted



                                                                 reference range

:



                                                                 4.20 - 10.70



                                                                 10*3/?L. The



                                                                 reference range

 was



                                                                 not used to



                                                                 interpret this



                                                                 result as



                                                                 normal/abnormal

.

 

             RBC (test code =              See_Comment                [Automated



             776-6)                                              message] The sy

stem



                                                                 which generated



                                                                 this result



                                                                 transmitted



                                                                 reference range

:



                                                                 4.26 - 5.52



                                                                 10*6/?L. The



                                                                 reference range

 was



                                                                 not used to



                                                                 interpret this



                                                                 result as



                                                                 normal/abnormal

.

 

             HGB (test code = 14.7 g/dL    12.2-16.4                 



             718-7)                                              

 

             HCT (test code = 43.7 %       38.4-49.3                 



             4544-3)                                             

 

             MCV (test code = 85.9 fL      81.7-95.6                 



             787-2)                                              

 

             MCH (test code = 28.9 pg      26.1-32.7                 



             785-6)                                              

 

             MCHC (test code = 33.6 g/dL    31.2-35.0                 



             786-4)                                              

 

             RDW-SD (test code = 42.4 fL      38.5-51.6                 



             40943-2)                                            

 

             RDW-CV (test code = 13.6 %       12.1-15.4                 



             788-0)                                              

 

             PLT (test code =              See_Comment  H             [Automated



             777-3)                                              message] The sy

stem



                                                                 which generated



                                                                 this result



                                                                 transmitted



                                                                 reference range

:



                                                                 150 - 328 10*3/

?L.



                                                                 The reference r

zhen



                                                                 was not used to



                                                                 interpret this



                                                                 result as



                                                                 normal/abnormal

.

 

             MPV (test code = 9.4 fL       9.8-13.0     L            



             91541-4)                                            

 

             NRBC/100 WBC (test              See_Comment                [Automat

ed



             code = 5032975215)                                        message] 

The system



                                                                 which generated



                                                                 this result



                                                                 transmitted



                                                                 reference range

:



                                                                 0.0 - 10.0 /100



                                                                 WBCs. The refer

ence



                                                                 range was not u

sed



                                                                 to interpret th

is



                                                                 result as



                                                                 normal/abnormal

.

 

             NRBC x10^3 (test code <0.01        See_Comment                [Auto

mated



             = 7147462388)                                        message] The s

ystem



                                                                 which generated



                                                                 this result



                                                                 transmitted



                                                                 reference range

:



                                                                 10*3/?L. The



                                                                 reference range

 was



                                                                 not used to



                                                                 interpret this



                                                                 result as



                                                                 normal/abnormal

.

 

             GRAN MAT (NEUT) % 76.4 %                                 



             (test code = 770-8)                                        

 

             IMM GRAN % (test code 0.40 %                                 



             = 1140123132)                                        

 

             LYMPH % (test code = 16.3 %                                 



             736-9)                                              

 

             MONO % (test code = 5.6 %                                  



             5905-5)                                             

 

             EOS % (test code = 1.0 %                                  



             713-8)                                              

 

             BASO % (test code = 0.3 %                                  



             706-2)                                              

 

             GRAN MAT x10^3(ANC) 8.81 10*3/uL 1.99-6.95    H            



             (test code =                                        



             4201777944)                                         

 

             IMM GRAN x10^3 (test 0.05 10*3/uL 0.00-0.06                 



             code = 0842075248)                                        

 

             LYMPH x10^3 (test code 1.88 10*3/uL 1.09-3.23                 



             = 731-0)                                            

 

             MONO x10^3 (test code 0.64 10*3/uL 0.36-1.02                 



             = 742-7)                                            

 

             EOS x10^3 (test code = 0.12 10*3/uL 0.06-0.53                 



             711-2)                                              

 

             BASO x10^3 (test code 0.03 10*3/uL 0.01-0.09                 



             = 704-7)                                            

 

             Lab Interpretation Abnormal                               



             (test code = 38261-9)                                        



[facility] 
[facility]01:33





             Test Item    Value        Reference Range Interpretation Comments

 

             SARS-CoV-2 Rapid ID NOW Not Detected Not Detected              



             (test code = 62807-0)                                        

 

             MAL (test code = MAL) ID NOW COVID-19 Assay                        

   



                          is an isothermal                           



                          nucleic acid                           



                          amplification test                           



                          intended for the                           



                          qualitative detection                           



                          of nucleic acid from                           



                          SARS-CoV-2 viral RNA                           



                          in nasopharyngeal (NP)                           



                          specimens. It is used                           



                          under Emergency Use                           



                          Authorization (EUA) by                           



                          FDA. The limit of                           



                          detection (LOD) of the                           



                          assay is 125 Genome                           



                          Equivalents/mL. A                           



                          positive result is                           



                          indicative of the                           



                          presence of SARS-CoV-2                           



                          RNA. ?Clinical                           



                          correlation with                           



                          patient history and                           



                          other diagnostic                           



                          information is                           



                          necessary to determine                           



                          patient infection                           



                          status. A negative                           



                          (Not Detected) result                           



                          does not preclude                           



                          SARS-CoV-2 infection.                           



                          In patients with                           



                          clinical symptoms and                           



                          other tests that are                           



                          consistent with                           



                          SARS-CoV-2 infection,                           



                          negative results                           



                          should be treated as                           



                          presumptive negative                           



                          and a new specimen                           



                          should be tested with                           



                          alternative PCR                           



                          molecular test.                           



                          Invalid: Please                           



                          collect a new specimen                           



                          for repeat patient                           



                          testing if clinically                           



                          indicated.                             

 

             Lab Interpretation Normal                                 



             (test code = 35471-8)                                        



[id number] 
01:00:33





             Test Item    Value        Reference Range Interpretation Comments

 

             NA (test code = 140 mmol/L   135-145                   



             9769970091)                                         

 

             K (test code = 4.4 mmol/L   3.5-5.0                   



             5528272639)                                         

 

             CL (test code = 103 mmol/L                       



             2131181974)                                         

 

             CO2 TOTAL (test code = 28 mmol/L    23-31                     



             6815476370)                                         

 

             AGAP (test code =              2-16                      



             7183341050)                                         

 

             BUN (test code = 15 mg/dL     7-23                      



             5760156042)                                         

 

             GLUCOSE (test code = 85 mg/dL                         



             6196035576)                                         

 

             CREATININE (test code = 0.60 mg/dL   0.60-1.25                 



             7759197913)                                         

 

             TOTAL BILI (test code = 1.1 mg/dL    0.1-1.1                   



             5071182407)                                         

 

             CALCIUM (test code = 9.0 mg/dL    8.6-10.6                  



             6431534339)                                         

 

             T PROTEIN (test code = 7.8 g/dL     6.3-8.2                   



             1714768425)                                         

 

             ALBUMIN (test code = 4.0 g/dL     3.5-5.0                   



             1423296378)                                         

 

             ALK PHOS (test code = 166 U/L             H            



             8219599377)                                         

 

             ALTv (test code = 42 U/L       5-50                      



             1742-6)                                             

 

             AST(SGOT) (test code = 32 U/L       13-40                     



             8057914733)                                         

 

             eGFR (test code =              mL/min/1.73m2              



             2106868322)                                         

 

             MAL (test code = MAL) Association of                           



                          Glomerular Filtration                           



                          Rate (GFR) and Staging                           



                          of Kidney Disease*                           



                          +---------------------                           



                          --+-------------------                           



                          --+-------------------                           



                          ------+| GFR                           



                          (mL/min/1.73 m2) ?|                           



                          With Kidney Damage ?|                           



                          ?Without Kidney                           



                          Damage+---------------                           



                          --------+-------------                           



                          --------+-------------                           



                          ------------+| ?>90 ?                           



                          ? ? ? ? ? ? ? ?|                           



                          ?Stage one ? ? ? ? ?|                           



                          ? Normal ? ? ? ? ? ? ?                           



                          ?+--------------------                           



                          ---+------------------                           



                          ---+------------------                           



                          -------+| ?60-89 ? ? ?                           



                          ? ? ? ? ?| ?Stage two                           



                          ? ? ? ? ?| ? Decreased                           



                          GFR ? ? ? ?                            



                          +---------------------                           



                          --+-------------------                           



                          --+-------------------                           



                          ------+| ?30-59 ? ? ?                           



                          ? ? ? ? ?| ?Stage                           



                          three ? ? ? ?| ? Stage                           



                          three ? ? ? ? ?                           



                          +---------------------                           



                          --+-------------------                           



                          --+-------------------                           



                          ------+| ?15-29 ? ? ?                           



                          ? ? ? ? ?| ?Stage four                           



                          ? ? ? ? | ? Stage four                           



                          ? ? ? ? ?                              



                          ?+--------------------                           



                          ---+------------------                           



                          ---+------------------                           



                          -------+| ?<15 (or                           



                          dialysis) ? ?| ?Stage                           



                          five ? ? ? ? | ? Stage                           



                          five ? ? ? ? ?                           



                          ?+--------------------                           



                          ---+------------------                           



                          ---+------------------                           



                          -------+ *Each stage                           



                          assumes the associated                           



                          GFR level has been in                           



                          effect for at least                           



                          three months. ?Stages                           



                          1 to 5, with or                           



                          without kidney                           



                          disease, indicate                           



                          chronic kidney                           



                          disease. Notes:                           



                          Determination of                           



                          stages one and two                           



                          (with eGFR                             



                          >59mL/min/1.73 m2)                           



                          requires estimation of                           



                          kidney damage for at                           



                          least three months as                           



                          defined by structural                           



                          or functional                           



                          abnormalities of the                           



                          kidney, manifested by                           



                          either:Pathological                           



                          abnormalities or                           



                          Markers of kidney                           



                          damage (including                           



                          abnormalities in the                           



                          composition of the                           



                          blood or urine or                           



                          abnormalities in                           



                          imaging tests).                           

 

             Lab Interpretation Abnormal                               



             (test code = 92052-2)                                        



[facility]





             Test Item    Value        Reference Range Interpretation Comments

 

             LIPASE (test code = 6642819650) 57 U/L       0-220                 

    

 

             Lab Interpretation (test code = Normal                             

    



             73979-8)                                            



[facility]





             Test Item    Value        Reference Range Interpretation Comments

 

             APPEARANCE (test code = Turbid       Clear        A            



             4809643822)                                         

 

             COLOR (test code = Yellow       Yellow                    



             3234878983)                                         

 

             PH (test code =              4.8-8.0                   



             7715817713)                                         

 

             SP GRAVITY (test code =              1.003-1.030               



             6235164943)                                         

 

             GLU U QUAL (test code = Normal       Normal                    



             4349895617)                                         

 

             BLOOD (test code = 1+           Negative     A            



             6250062308)                                         

 

             KETONES (test code = 20 mg/dL     Negative     A            



             9811018397)                                         

 

             PROTEIN (test code = 100 mg/dL    Negative     A            



             2887-8)                                             

 

             UROBILIN (test code = 2.0 mg/dL    Normal       A            



             0740867519)                                         

 

             BILIRUBIN (test code = Negative     Negative                  



             6919560749)                                         

 

             NITRITE (test code = Positive     Negative     A            



             1328493361)                                         

 

             LEUK FRANCE (test code = 250/uL       Negative     A            



             5144588356)                                         

 

             RBC/HPF (test code =              See_Comment  H             [Autom

ated message]



             8527225263)                                         The system [employer]



                                                                 generated this



                                                                 result transmit

huma



                                                                 reference range

: 0 -



                                                                 3 HPF. The refe

rence



                                                                 range was not u

sed



                                                                 to interpret th

is



                                                                 result as



                                                                 normal/abnormal

.

 

             WBC/HPF (test code = >182         See_Comment  H             [Autom

ated message]



             9261445302)                                         The system [employer]



                                                                 generated this



                                                                 result transmit

huma



                                                                 reference range

: 0 -



                                                                 5 HPF. The refe

rence



                                                                 range was not u

sed



                                                                 to interpret th

is



                                                                 result as



                                                                 normal/abnormal

.

 

             BACTERIA (test code = Many         Negative     A            



             5813184747)                                         

 

             MUCOUS (test code = Moderate     Negative LPF A            



             6460046282)                                         

 

             WBC CLUMPS (test code =              See_Comment  H             [Au

tomated message]



             6256743830)                                         The system [employer]



                                                                 generated this



                                                                 result transmit

huma



                                                                 reference range

: <=1



                                                                 HPF. The refere

nce



                                                                 range was not u

sed



                                                                 to interpret th

is



                                                                 result as



                                                                 normal/abnormal

.

 

             Lab Interpretation (test Abnormal                               



             code = 07820-9)                                        



[facility] WITH DIFF2021-05-10 00:46:14





             Test Item    Value        Reference Range Interpretation Comments

 

             WBC (test code =              See_Comment                [Automated



             6690-2)                                             message] The sy

stem



                                                                 which generated



                                                                 this result



                                                                 transmitted



                                                                 reference range

:



                                                                 4.20 - 10.70



                                                                 10*3/?L. The



                                                                 reference range

 was



                                                                 not used to



                                                                 interpret this



                                                                 result as



                                                                 normal/abnormal

.

 

             RBC (test code =              See_Comment                [Automated



             789-8)                                              message] The sy

stem



                                                                 which generated



                                                                 this result



                                                                 transmitted



                                                                 reference range

:



                                                                 4.26 - 5.52



                                                                 10*6/?L. The



                                                                 reference range

 was



                                                                 not used to



                                                                 interpret this



                                                                 result as



                                                                 normal/abnormal

.

 

             HGB (test code = 15.9 g/dL    12.2-16.4                 



             718-7)                                              

 

             HCT (test code = 47.1 %       38.4-49.3                 



             4544-3)                                             

 

             MCV (test code = 86.6 fL      81.7-95.6                 



             787-2)                                              

 

             MCH (test code = 29.2 pg      26.1-32.7                 



             785-6)                                              

 

             MCHC (test code = 33.8 g/dL    31.2-35.0                 



             786-4)                                              

 

             RDW-SD (test code = 47.6 fL      38.5-51.6                 



             67993-3)                                            

 

             RDW-CV (test code = 15.2 %       12.1-15.4                 



             788-0)                                              

 

             PLT (test code =              See_Comment  H             [Automated



             777-3)                                              message] The sy

stem



                                                                 which generated



                                                                 this result



                                                                 transmitted



                                                                 reference range

:



                                                                 150 - 328 10*3/

?L.



                                                                 The reference r

zhen



                                                                 was not used to



                                                                 interpret this



                                                                 result as



                                                                 normal/abnormal

.

 

             MPV (test code = 9.4 fL       9.8-13.0     L            



             52500-4)                                            

 

             NRBC/100 WBC (test              See_Comment                [Automat

ed



             code = 1129566465)                                        message] 

The system



                                                                 which generated



                                                                 this result



                                                                 transmitted



                                                                 reference range

:



                                                                 0.0 - 10.0 /100



                                                                 WBCs. The refer

ence



                                                                 range was not u

sed



                                                                 to interpret th

is



                                                                 result as



                                                                 normal/abnormal

.

 

             NRBC x10^3 (test code <0.01        See_Comment                [Auto

mated



             = 8846881356)                                        message] The s

ystem



                                                                 which generated



                                                                 this result



                                                                 transmitted



                                                                 reference range

:



                                                                 10*3/?L. The



                                                                 reference range

 was



                                                                 not used to



                                                                 interpret this



                                                                 result as



                                                                 normal/abnormal

.

 

             GRAN MAT (NEUT) % 61.3 %                                 



             (test code = 770-8)                                        

 

             IMM GRAN % (test code 0.70 %                                 



             = [id number])                                        

 

             LYMPH % (test code = 26.4 %                                 



             736-9)                                              

 

             MONO % (test code = 9.9 %                                  



             5905-5)                                             

 

             EOS % (test code = 1.3 %                                  



             713-8)                                              

 

             BASO % (test code = 0.4 %                                  



             706-2)                                              

 

             GRAN MAT x10^3(ANC) 6.39 10*3/uL 1.99-6.95                 



             (test code =                                        



             0535243593)                                         

 

             IMM GRAN x10^3 (test 0.07 10*3/uL 0.00-0.06    H            



             code = 5056464031)                                        

 

             LYMPH x10^3 (test code 2.75 10*3/uL 1.09-3.23                 



             = 731-0)                                            

 

             MONO x10^3 (test code 1.03 10*3/uL 0.36-1.02    H            



             = 742-7)                                            

 

             EOS x10^3 (test code = 0.14 10*3/uL 0.06-0.53                 



             711-2)                                              

 

             BASO x10^3 (test code 0.04 10*3/uL 0.01-0.09                 



             = 704-7)                                            

 

             Lab Interpretation Abnormal                               



             (test code = 18123-4)                                        



[id number]00





             Test Item    Value        Reference Range Interpretation Comments

 

             POC-GLUCOSE METER 140 mg/dL           H            : TESTED A

T [facility] [address]



             (BEAKER) (test code =                                        [facility],



             1538)                                               [address]:



                                                                 /Techni

christina ID



                                                                 = [id number] for ANANT CATES



POCT-GLUCOSE [id number] 09:15:00





             Test Item    Value        Reference Range Interpretation Comments

 

             POC-GLUCOSE METER 142 mg/dL           H            : TESTED A

T [facility] [address]



             (BEAKER) (test code =                                        [facility],



             1538)                                               [address]:



                                                                 /Techni

christina ID



                                                                 = [id number] for ANANT CATES



POCT-GLUCOSE METER2020-01-15 20:56:00





             Test Item    Value        Reference Range Interpretation Comments

 

             POC-GLUCOSE METER 134 mg/dL           H            : TESTED A

T [facility] [address]



             (BEAKER) (test code =                                        [facility],



             1538)                                               [address]:



                                                                 /Techni

christina ID



                                                                 = [id number] for SHERRILL GUARDADO



POCT-GLUCOSE METER2020-01-15 16:46:00





             Test Item    Value        Reference Range Interpretation Comments

 

             POC-GLUCOSE METER 91 mg/dL                         : TESTED A

T [facility] [address]



             (BEAKER) (test code =                                        [facility],



             1538)                                               [address]:



                                                                 /Techni

christina ID =



                                                                 [id number] for ANANT HAMILTON



POCT-GLUCOSE METER2020-01-15 12:19:00





             Test Item    Value        Reference Range Interpretation Comments

 

             POC-GLUCOSE METER 237 mg/dL           H            : TESTED A

T [facility] [address]



             (BEAKER) (test code =                                        [facility],



             1538)                                               [address]:



                                                                 /Techni

christina ID



                                                                 = [id number] for ANANT CATES



MR, EXTREMITY, LOWER, WITHOUT CONTRAST, RIGHT2020-01-15 09:12:00FINAL REPORT 
PATIENT ID: [id number] MRI of the right and left hindfoot without intravenous 
contrast History: Osteomyelitis, diabetic foot Comparison: Radiograph dated 
2020 Technique: Multiplanar multisequence MRI of the right and left 
hindfoot was performed without intravenous contrast using the hindfoot 
osteomyelitis protocol Findings: Right foot: Marked T1 and T2 hypointense soft 
tissue thickening is noted at the medial aspect of the right heel, likely to 
reflect scar tissue. There is also mild subcutaneous edema at the lateral aspect
of the mid foot and forefoot, incompletely imaged. Nodiscrete drainable fluid 
collection is identified. There is no marrow signal abnormality to suggest o
steomyelitis. There is a small nonspecific joint effusion at the ankle and 
subtalar joint. Scattereddegenerative changes are noted. There is marked fatty 
atrophy of the distal leg and foot musculature. Severe flexor hallucis longus 
tendinopathy. No tenosynovitis. Left foot: There is a large area of soft tissue 
defect and granulation tissue at the posteromedial aspect of the heel, reaching 
the calcaneus, but there is no drainable fluid collection. Deformity is noted in
the hindfoot, and the forefootappears adducted. No acute fracture. No marrow 
signal abnormality is identified to indicate acute osteomyelitis. There is no 
significant joint effusion. There is severe atrophy of the foot and distal leg 
musculature. No significant tenosynovitis identified. IMPRESSION: 
Granulation/scar tissue at the medial aspect of the heel bilaterally. No MR 
evidence of osteomyelitis. Severe muscle atrophy. Signed:Jorge Cornejo 
Verified Date/Time: 01/15/2020 09:12:01 Reading Location: [facility] C013 Ortho 
Consult Reading Room Electronically signed by: JORGE CORNEJO M.D. on 01/15/2020 
09:12 AMMR, EXTREMITY, LOWER, WITHOUT CONTRAST, LEFT2020-01-15 09:12:00FINAL 
REPORT PATIENT ID:  [id number] MRI of the right and left hindfoot without 
intravenous contrast History: Osteomyelitis, diabetic foot Comparison: 
Radiograph dated 2020 Technique: Multiplanar multisequence MRI of the
right and left hindfoot was performed without intravenous contrast usingthe 
hindfoot osteomyelitis protocol Findings: Right foot: Marked T1 and T2 
hypointense soft tissue thickening is noted at the medial aspect of the right 
heel, likely to reflect scar tissue. There is also mild subcutaneous edema at 
the lateral aspect of the mid foot and forefoot, incompletely imaged. No 
discrete drainable fluid collection is identified. There is no marrow signal 
abnormality to suggestosteomyelitis. There is a small nonspecific joint effusion
at the ankle and subtalar joint. Scattered degenerative changes are noted. There
is marked fatty atrophy of the distal leg and foot musculature. Severe flexor 
hallucis longus tendinopathy. No tenosynovitis. Left foot: There is a large area
of soft tissue defect and granulation tissue at the posteromedial aspect of the 
heel, reaching the calcaneus, but there is no drainable fluid collection. 
Deformity is noted in the hindfoot, and the forefoot appears adducted. No acute 
fracture. No marrow signal abnormality is identified to indicate acute os
teomyelitis. There is no significant joint effusion. There is severe atrophy of 
the foot and distal leg musculature. No significant tenosynovitis identified. 
IMPRESSION: Granulation/scar tissue at the medial aspect of the heel 
bilaterally. No MR evidence of osteomyelitis. Severe muscle atrophy. Signed: 
Jorge Cornejo Verified Date/Time: 01/15/2020 09:12:01 Reading Location: [facility] C013X Ortho Consult Reading Room Electronically signed by: JORGE CORNEJO M.D. on
01/15/2020 09:12 AMPOCT-GLUCOSE METER2020-01-15 08:47:00





             Test Item    Value        Reference Range Interpretation Comments

 

             POC-GLUCOSE METER 264 mg/dL           H            : TESTED A

T [facility] [address]



             ([facility]) (test code =                                        [facility],



             153)                                               [address]:



                                                                 /Techni

christina ID



                                                                 = [id number] for ANANT CATES



ISLET CELL AB SCR2020-01-15 07:43:00





             Test Item    Value        Reference Range Interpretation Comments

 

             ISLET CELL AB Refer to individual                           



             AUTOVERIFICATION (test Islet Cell Ab                           



             code = 2556) and/or Islet Cell                           



                          Ab Titer results.                           



POCT-GLUCOSE [id number] 21:27:00





             Test Item    Value        Reference Range Interpretation Comments

 

             POC-GLUCOSE METER 130 mg/dL           H            : TESTED A

T [facility] [address]



             [facility] (test code =                                        [facility],



             153)                                               [address]:



                                                                 /Techni

christina ID



                                                                 = [id number] for KRANTHI PIERRE



BLOOD [id number] 13:00:00





             Test Item    Value        Reference Range Interpretation Comments

 

             CULTURE (BEAKER) (test No growth in 5 days                         

  



             code = 1095)                                        



BLOOD [id number] 13:00:00





             Test Item    Value        Reference Range Interpretation Comments

 

             CULTURE (BEAKER) (test No growth in 5 days                         

  



             code = 1095)                                        



POCT-GLUCOSE [id number] 12:57:00





             Test Item    Value        Reference Range Interpretation Comments

 

             POC-GLUCOSE METER 138 mg/dL           H            : TESTED A

[facility]



             ([facility]) (test code =                                        [facility],



             153)                                               [address]:



                                                                 /Techni

christina ID



                                                                 = [id number] for [place]

LLIS,



                                                                 BARBARA



POCT-GLUCOSE [id number] 08:43:00





             Test Item    Value        Reference Range Interpretation Comments

 

             POC-GLUCOSE METER 226 mg/dL           H            : TESTED A

[facility]



             ([facility]) (test code =                                        [facility],



             153)                                               [address]:



                                                                 /Techni

christina ID



                                                                 = [id number] for [place]

LLIS,



                                                                 BARBARA



POCT-GLUCOSE [id number] 21:11:00





             Test Item    Value        Reference Range Interpretation Comments

 

             POC-GLUCOSE METER 254 mg/dL           H            : Notified

 RN/MD:



             ([facility]) (test code =                                        TESTED

 AT [facility] [address]



             1538)                                               [facility],



                                                                 [address]:



                                                                 /Techni

christina ID



                                                                 = [id number] for KRANTHI PIERRE



POCT-GLUCOSE [id number] 21:02:00





             Test Item    Value        Reference Range Interpretation Comments

 

             POC-GLUCOSE METER 177 mg/dL           H            : TESTED A

T [facility] [address]



             [facility] (test code =                                        [facility],



             1538)                                               [address]:



                                                                 /Techni

christina ID



                                                                 = [id number] for [place]

LLIS,



                                                                 BARBARA



POCT-GLUCOSE [id number] 12:31:00





             Test Item    Value        Reference Range Interpretation Comments

 

             POC-GLUCOSE METER 215 mg/dL           H            : TESTED A

T [facility] [address]



             ([facility]) (test code =                                        [facility],



             153)                                               [address]:



                                                                 /Techni

christina ID



                                                                 = [id number] for [place]

LLIS,



                                                                 BARBARA



WOUND CULTURE + GRAM [id number] 10:10:00





             Test Item    Value        Reference    Interpretation Comments



                                       Range                     

 

             CULTURE (AKER) PROTEUS MIRABILIS              A            1+ Pro

teus



             (test code = 1095)                                        mirabilis

 

             Amikacin (test code =                           S            



             1)                                                  

 

             Ampicillin +                           S            



             Sulbactam (test code                                        



             = 6)                                                

 

             Aztreonam (test code                           S            



             = 32)                                               

 

             Cefepime (test code =                           S            



             51)                                                 

 

             Cefoxitin (test code                           R            



             = 68)                                               

 

             Ceftazidime (test                           S            



             code = 27)                                          

 

             Ceftriaxone (test                           S            



             code = 52)                                          

 

             Ertapenem (test code                           S            



             = 38)                                               

 

             Gentamicin (test code                           S            



             = 18)                                               

 

             Levofloxacin (test                           R            



             code = 22)                                          

 

             Meropenem (test code                           S            



             = 34)                                               

 

             Piperacillin +                           S            



             Tazobactam (test code                                        



             = 29)                                               

 

             Tetracycline (test                           R            



             code = 2)                                           

 

             Tobramycin (test code                           S            



             = 25)                                               

 

             Trimethoprim +                           R            



             Sulfamethoxazole                                        



             (test code = 47)                                        

 

             CULTURE (BEAKER) METHICILLIN               A            1+ Methicil

bryn



             (test code = 1095) RESISTANT                              resistant



                          STAPHYLOCOCCUS                           Staphylococcu

s



                          AUREUS                                 aureus

 

             Clindamycin (test                           R            



             code = 10)                                          

 

             Erythromycin (test                           R            



             code = 4)                                           

 

             Linezolid (test code                           S            



             = 40)                                               

 

             Nitrofurantoin (test                           S            



             code = 23)                                          

 

             Oxacillin (test code                           R            



             = 14)                                               

 

             Rifampin (test code =                           S            



             43)                                                 

 

             Tetracycline (test                           S            



             code = 2)                                           

 

             Trimethoprim +                           S            



             Sulfamethoxazole                                        



             (test code = 47)                                        

 

             Vancomycin (test code                           S            



             = 13)                                               

 

             CULTURE (BEAKER) ESCHERICHIA COLI              A            <1+ Esc

herichia



             (test code = 1095)                                        coliESBL 

Positive

 

             Amikacin (test code =                           S            



             1)                                                  

 

             Ampicillin +                           R            



             Sulbactam (test code                                        



             = 6)                                                

 

             Aztreonam (test code                           R            



             = 32)                                               

 

             Cefepime (test code =                           R            



             51)                                                 

 

             Cefoxitin (test code                           R            



             = 68)                                               

 

             Ceftazidime (test                           R            



             code = 27)                                          

 

             Ceftriaxone (test                           R            



             code = 52)                                          

 

             Ertapenem (test code                           S            



             = 38)                                               

 

             Gentamicin (test code                           S            



             = 18)                                               

 

             Levofloxacin (test                           R            



             code = 22)                                          

 

             Meropenem (test code                           S            



             = 34)                                               

 

             Nitrofurantoin (test                           S            



             code = 23)                                          

 

             Piperacillin +                           R            



             Tazobactam (test code                                        



             = 29)                                               

 

             Tetracycline (test                           S            



             code = 2)                                           

 

             Tobramycin (test code                           S            



             = 25)                                               

 

             Trimethoprim +                           R            



             Sulfamethoxazole                                        



             (test code = 47)                                        

 

             CULTURE (BEAKER)                           A            Beta-hemoly

tic



             (test code = 1095)                                        streptoco

ccus



                                                                 group B, by



                                                                 serological



                                                                 grouping

 

             GRAM STAIN RESULT 3+ WBCs                                



             (BEAKER) (test code =                                        



             1123)                                               

 

             GRAM STAIN RESULT 1+ gram negative                           



             (BEAKER) (test code = rods                                   



             787149)                                             

 

             GRAM STAIN RESULT 2+ gram positive                           



             (BEAKER) (test code = rods                                   



             075489)                                             

 

             GRAM STAIN RESULT <1+ gram negative                           



             (BEAKER) (test code = coccobacilli                           



             502560)                                             

 

             GRAM STAIN RESULT 2+ gram positive                           



             (BEAKER) (test code = cocci in pairs                           



             909833)                                             



POCT-GLUCOSE [id number] 08:52:00





             Test Item    Value        Reference Range Interpretation Comments

 

             POC-GLUCOSE METER 209 mg/dL           H            : TESTED A

T [facility] [address]



             (BEAKER) (test code =                                        [facility]

[employer] [facility] [facility],



             1538)                                               [id number]:



                                                                 /Techni

christina ID



                                                                 = [id number] for [place]

CRESENCIO,



                                                                 BARBARA



POCT-GLUCOSE [id number] 21:26:00





             Test Item    Value        Reference Range Interpretation Comments

 

             POC-GLUCOSE METER 255 mg/dL           H            : TESTED A

T [facility] 6720



             (BEAKER) (test code =                                        [facility]

[employer] [facility] [facility],



             1538)                                               [id number]:



                                                                 /Techni

christina ID



                                                                 = [id number] for SHERRILL GUARADDO



POCT-GLUCOSE [id number] 17:34:00





             Test Item    Value        Reference Range Interpretation Comments

 

             POC-GLUCOSE METER 227 mg/dL           H            : TESTED A

T [facility] 6720



             (BEAKER) (test code =                                        [facility],



             153)                                               [address]:



                                                                 /Techni

christina ID



                                                                 = [id number] for WASSERMAN

DALTON,



                                                                 NAKITA



POCT-GLUCOSE [id number] 11:28:00





             Test Item    Value        Reference Range Interpretation Comments

 

             POC-GLUCOSE METER 282 mg/dL           H            : TESTED A

T [facility] 6720



             (BEAKER) (test code =                                        [facility],



             1538)                                               [address]:



                                                                 /Techni

christina ID



                                                                 = [id number] for WASSERMAN

NNY,



                                                                 NAKITA



POCT-GLUCOSE [id number] 08:19:00





             Test Item    Value        Reference Range Interpretation Comments

 

             POC-GLUCOSE METER 329 mg/dL           H            : TESTED A

T [facility] 6720



             ([facility]) (test code =                                        [facility],



             1538)                                               [address]:



                                                                 /Techni

christina ID



                                                                 = [id number] for ANANT CATES



POCT-GLUCOSE [id number] 21:32:00





             Test Item    Value        Reference Range Interpretation Comments

 

             POC-GLUCOSE METER 275 mg/dL           H            : TESTED A

T [facility] 6720



             (BEAKER) (test code =                                        [facility],



             153)                                               [address]:



                                                                 /Techni

christina ID



                                                                 = [id number] for SHERRILL GUARDADO



POCT-GLUCOSE [id number] 17:47:00





             Test Item    Value        Reference Range Interpretation Comments

 

             POC-GLUCOSE METER 304 mg/dL           H            : TESTED A

T [facility] 6720



             (BEAKER) (test code =                                        [facility],



             1538)                                               [address]:



                                                                 /Techni

christina ID



                                                                 = [id number] for [place]

RIN,



                                                                 LUIZA



URINE [id number] 15:21:00





             Test Item    Value        Reference Range Interpretation Comments

 

             [facility]                           A            >100,000 co

l/mL



             (test code = 1095)                                        Beta-hemo

lytic



                                                                 streptococcus g

roup



                                                                 B, by serologic

al



                                                                 grouping

 

             CULTURE ([facility]) PROTEUS                   A            80-89,000 c

ol/mL



             (test code = 1095) MIRABILIS                              Proteus



                                                                 mirabilisESBL



                                                                 Positive

 

             Amikacin (test code =                           S            



             1)                                                  

 

             Ampicillin +                           R            



             Sulbactam (test code                                        



             = 6)                                                

 

             Aztreonam (test code                           R            



             = 32)                                               

 

             Cefepime (test code =                           R            



             51)                                                 

 

             Cefoxitin (test code                           R            



             = 68)                                               

 

             Ceftazidime (test                           R            



             code = 27)                                          

 

             Ceftriaxone (test                           R            



             code = 52)                                          

 

             Ertapenem (test code                           S            



             = 38)                                               

 

             Gentamicin (test code                           R            



             = 18)                                               

 

             Levofloxacin (test                           R            



             code = 22)                                          

 

             Meropenem (test code                           S            



             = 34)                                               

 

             Nitrofurantoin (test                           R            



             code = 23)                                          

 

             Tetracycline (test                           R            



             code = 2)                                           

 

             Tobramycin (test code                           R            



             = 25)                                               



POCT-GLUCOSE [id number] 12:16:00





             Test Item    Value        Reference Range Interpretation Comments

 

             POC-GLUCOSE METER 337 mg/dL           H            : TESTED A

T [facility] 6720



             ([employer]) (test code =                                        [facility],



             1538)                                               [address]:



                                                                 /Techni

christina ID



                                                                 = [id number] for LUIZA FIGUEROA



BASIC METABOLIC [id number] 10:39:00





             Test Item    Value        Reference Range Interpretation Comments

 

             SODIUM (BEAKER) 134 meq/L    136-145      L            



             (test code = 381)                                        

 

             POTASSIUM (BEAKER) 4.6 meq/L    3.5-5.1                   



             (test code = 379)                                        

 

             CHLORIDE (BEAKER) 105 meq/L                        



             (test code = 382)                                        

 

             CO2 (BEAKER) (test 20 meq/L     22-29        L            



             code = 355)                                         

 

             BLOOD UREA NITROGEN 14 mg/dL     7-21                      



             (BEAKER) (test code                                        



             = 354)                                              

 

             CREATININE (BEAKER) 0.97 mg/dL   0.57-1.25                 



             (test code = 358)                                        

 

             GLUCOSE RANDOM 429 mg/dL           HH           



             (BEAKER) (test code                                        



             = 652)                                              

 

             CALCIUM (BEAKER) 8.9 mg/dL    8.4-10.2                  



             (test code = 697)                                        

 

             EGFR (BEAKER) (test 107 mL/min/1.73                           ESTIM

ATED GFR IS



             code = 1092) sq m                                   NOT AS ACCURATE

 AS



                                                                 CREATININE



                                                                 CLEARANCE IN



                                                                 PREDICTING



                                                                 GLOMERULAR



                                                                 FILTRATION RATE

.



                                                                 ESTIMATED GFR I

S



                                                                 NOT APPLICABLE 

FOR



                                                                 DIALYSIS PATIEN

TS.



 ID - MAGAN FPOCT-GLUCOSE [id number] 08:29:00





             Test Item    Value        Reference Range Interpretation Comments

 

             POC-GLUCOSE METER 395 mg/dL           H            : TESTED A

T [facility] 6720



             (BEAKER) (test code =                                        [place] [place],



             1538)                                               [address]:



                                                                 /Techni

christina ID



                                                                 = [id number] for LUIZA FIGUEROA



CBC W/PLT COUNT &amp; AUTO [id number] 06:40:00





             Test Item    Value        Reference Range Interpretation Comments

 

             WHITE BLOOD CELL COUNT (BEAKER) 7.2 K/ L     3.5-10.5              

    



             (test code = 775)                                        

 

             RED BLOOD CELL COUNT (BEAKER) 5.48 M/ L    4.63-6.08               

  



             (test code = 761)                                        

 

             HEMOGLOBIN (BEAKER) (test code = 15.1 GM/DL   13.7-17.5            

     



             410)                                                

 

             HEMATOCRIT (BEAKER) (test code = 46.4 %       40.1-51.0            

     



             411)                                                

 

             MEAN CORPUSCULAR VOLUME (BEAKER) 84.7 fL      79.0-92.2            

     



             (test code = 753)                                        

 

             MEAN CORPUSCULAR HEMOGLOBIN 27.6 pg      25.7-32.2                 



             (BEAKER) (test code = 751)                                        

 

             MEAN CORPUSCULAR HEMOGLOBIN CONC 32.5 GM/DL   32.3-36.5            

     



             (BEAKER) (test code = 752)                                        

 

             RED CELL DISTRIBUTION WIDTH 15.5 %       11.6-14.4    H            



             (BEAKER) (test code = 412)                                        

 

             PLATELET COUNT (BEAKER) (test 315 K/CU MM  150-450                 

  



             code = 756)                                         

 

             MEAN PLATELET VOLUME (BEAKER) 10.5 fL      9.4-12.4                

  



             (test code = 754)                                        

 

             NUCLEATED RED BLOOD CELLS 0 /100 WBC   0-0                       



             (BEAKER) (test code = 413)                                        

 

             NEUTROPHILS RELATIVE PERCENT 62 %                                  

 



             (BEAKER) (test code = 429)                                        

 

             LYMPHOCYTES RELATIVE PERCENT 26 %                                  

 



             (BEAKER) (test code = 430)                                        

 

             MONOCYTES RELATIVE PERCENT 9 %                                    



             (BEAKER) (test code = 431)                                        

 

             EOSINOPHILS RELATIVE PERCENT 2 %                                   

 



             (BEAKER) (test code = 432)                                        

 

             BASOPHILS RELATIVE PERCENT 0 %                                    



             (BEAKER) (test code = 437)                                        

 

             NEUTROPHILS ABSOLUTE COUNT 4.48 K/ L    1.78-5.38                 



             (BEAKER) (test code = 670)                                        

 

             LYMPHOCYTES ABSOLUTE COUNT 1.87 K/ L    1.32-3.57                 



             (BEAKER) (test code = 414)                                        

 

             MONOCYTES ABSOLUTE COUNT (BEAKER) 0.62 K/ L    0.30-0.82           

      



             (test code = 415)                                        

 

             EOSINOPHILS ABSOLUTE COUNT 0.17 K/ L    0.04-0.54                 



             (BEAKER) (test code = 416)                                        

 

             BASOPHILS ABSOLUTE COUNT (BEAKER) 0.03 K/ L    0.01-0.08           

      



             (test code = 417)                                        

 

             IMMATURE GRANULOCYTES-RELATIVE 1 %          0-1                    

   



             PERCENT (BEAKER) (test code =                                      

  



             2801)                                               



POCT-GLUCOSE METER2020-01-10 19:55:00





             Test Item    Value        Reference Range Interpretation Comments

 

             POC-GLUCOSE METER 369 mg/dL           H            : TESTED A

T [facility] 6720



             (BEAKER) (test code =                                        [facility],



             1538)                                               [address]:



                                                                 /Techni

christina ID



                                                                 = [id number] for SHERRILL GUARDADO



POCT-GLUCOSE METER2020-01-10 16:45:00





             Test Item    Value        Reference Range Interpretation Comments

 

             POC-GLUCOSE METER 272 mg/dL           H            : TESTED A

T [facility] 6720



             (BEAKER) (test code =                                        [facility],



             1538)                                               [address]:



                                                                 /Techni

christina ID



                                                                 = [id number] for SARAH VIDES



POCT-GLUCOSE METER2020-01-10 11:40:00





             Test Item    Value        Reference Range Interpretation Comments

 

             POC-GLUCOSE METER 277 mg/dL           H            : TESTED A

T [facility] 6720



             (BEAKER) (test code =                                        [facility],



             153)                                               [address]:



                                                                 /Techni

christina ID



                                                                 = [id number] for SARAH VIDES



BASIC METABOLIC PANEL2020-01-10 10:22:00





             Test Item    Value        Reference Range Interpretation Comments

 

             SODIUM (BEAKER) 132 meq/L    136-145      L            



             (test code = 381)                                        

 

             POTASSIUM (BEAKER) 4.4 meq/L    3.5-5.1                   



             (test code = 379)                                        

 

             CHLORIDE (BEAKER) 103 meq/L                        



             (test code = 382)                                        

 

             CO2 (BEAKER) (test 21 meq/L     22-29        L            



             code = 355)                                         

 

             BLOOD UREA NITROGEN 14 mg/dL     7-21                      



             (BEAKER) (test code                                        



             = 354)                                              

 

             CREATININE (BEAKER) 0.89 mg/dL   0.57-1.25                 



             (test code = 358)                                        

 

             GLUCOSE RANDOM 428 mg/dL           HH           



             (BEAKER) (test code                                        



             = 652)                                              

 

             CALCIUM (BEAKER) 9.2 mg/dL    8.4-10.2                  



             (test code = 697)                                        

 

             EGFR (BEAKER) (test 119 mL/min/1.73                           ESTIM

ATED GFR IS



             code = 1092) sq m                                   NOT AS ACCURATE

 AS



                                                                 CREATININE



                                                                 CLEARANCE IN



                                                                 PREDICTING



                                                                 GLOMERULAR



                                                                 FILTRATION RATE

.



                                                                 ESTIMATED GFR I

S



                                                                 NOT APPLICABLE 

FOR



                                                                 DIALYSIS PATIEN

TS.



 ID - NTPLIPID PANEL2020-01-10 10:17:00





             Test Item    Value        Reference Range Interpretation Comments

 

             TRIGLYCERIDES (BEAKER) (test code = 692 mg/dL                      

        



             540)                                                

 

             CHOLESTEROL (BEAKER) (test code = 196 mg/dL                        

      



             631)                                                

 

             HDL CHOLESTEROL (BEAKER) (test code 24 mg/dL                       

        



             = 976)                                              



Calculated LDL not valid if triglyceride &gt;400 mg/dLTriglyceride Reference 
Range: Low Risk &lt;150Borderline 150-199 High Risk 200-499 Very High Risk 
&gt;=500Cholesterol Reference Range: Low Risk  &lt;200 Borderline 200-239 High 
Risk &gt;240HDL Cholesterol Reference Range: Low Risk &gt;=60 High Risk 
&lt;40LDL Cholesterol Reference Range: Optimal &lt;100 Near Optimal 100-129 
Borderline 130-159 [id number] Very High &gt;=190  ID - [id number] 
METER2020-01-10 08:28:00





             Test Item    Value        Reference Range Interpretation Comments

 

             POC-GLUCOSE METER 388 mg/dL           H            : TESTED A

[facility]



             (BEAKER) (test code =                                        [facility] [place],



             1538)                                               [address]:



                                                                 /Techni

christina ID



                                                                 = [id number] for ANANT CATES



HEMOGLOBIN A1C2020-01-10 07:54:00





             Test Item    Value        Reference Range Interpretation Comments

 

             HEMOGLOBIN A1C (BEAKER) (test code = 10.4 %       4.3-6.1      H   

         



             368)                                                



CBC W/PLT COUNT &amp; AUTO DIFFERENTIAL2020-01-10 05:56:00





             Test Item    Value        Reference Range Interpretation Comments

 

             WHITE BLOOD CELL COUNT (BEAKER) 6.5 K/ L     3.5-10.5              

    



             (test code = 775)                                        

 

             RED BLOOD CELL COUNT (BEAKER) 5.21 M/ L    4.63-6.08               

  



             (test code = 761)                                        

 

             HEMOGLOBIN (BEAKER) (test code = 14.6 GM/DL   13.7-17.5            

     



             410)                                                

 

             HEMATOCRIT (BEAKER) (test code = 44.3 %       40.1-51.0            

     



             411)                                                

 

             MEAN CORPUSCULAR VOLUME (BEAKER) 85.0 fL      79.0-92.2            

     



             (test code = 753)                                        

 

             MEAN CORPUSCULAR HEMOGLOBIN 28.0 pg      25.7-32.2                 



             (BEAKER) (test code = 751)                                        

 

             MEAN CORPUSCULAR HEMOGLOBIN CONC 33.0 GM/DL   32.3-36.5            

     



             (BEAKER) (test code = 752)                                        

 

             RED CELL DISTRIBUTION WIDTH 15.6 %       11.6-14.4    H            



             (BEAKER) (test code = 412)                                        

 

             PLATELET COUNT (BEAKER) (test 294 K/CU MM  150-450                 

  



             code = 756)                                         

 

             MEAN PLATELET VOLUME (BEAKER) 11.2 fL      9.4-12.4                

  



             (test code = 754)                                        

 

             NUCLEATED RED BLOOD CELLS 0 /100 WBC   0-0                       



             (BEAKER) (test code = 413)                                        

 

             NEUTROPHILS RELATIVE PERCENT 56 %                                  

 



             (BEAKER) (test code = 429)                                        

 

             LYMPHOCYTES RELATIVE PERCENT 31 %                                  

 



             (BEAKER) (test code = 430)                                        

 

             MONOCYTES RELATIVE PERCENT 10 %                                   



             (BEAKER) (test code = 431)                                        

 

             EOSINOPHILS RELATIVE PERCENT 2 %                                   

 



             (BEAKER) (test code = 432)                                        

 

             BASOPHILS RELATIVE PERCENT 1 %                                    



             (BEAKER) (test code = 437)                                        

 

             NEUTROPHILS ABSOLUTE COUNT 3.66 K/ L    1.78-5.38                 



             (BEAKER) (test code = 670)                                        

 

             LYMPHOCYTES ABSOLUTE COUNT 2.03 K/ L    1.32-3.57                 



             (BEAKER) (test code = 414)                                        

 

             MONOCYTES ABSOLUTE COUNT (BEAKER) 0.63 K/ L    0.30-0.82           

      



             (test code = 415)                                        

 

             EOSINOPHILS ABSOLUTE COUNT 0.15 K/ L    0.04-0.54                 



             (BEAKER) (test code = 416)                                        

 

             BASOPHILS ABSOLUTE COUNT (BEAKER) 0.03 K/ L    0.01-0.08           

      



             (test code = 417)                                        

 

             IMMATURE GRANULOCYTES-RELATIVE 1 %          0-1                    

   



             PERCENT (BEAKER) (test code =                                      

  



             2801)                                               



POCT-GLUCOSE [id number] 23:47:00





             Test Item    Value        Reference Range Interpretation Comments

 

             POC-GLUCOSE METER 359 mg/dL           H            : Notified

 RN/MD:



             (BEAKER) (test code =                                        TESTED

 AT [facility] 4483 4323)                                               [facility],



                                                                 [address]:



                                                                 /Techni

christina ID



                                                                 = [id number] for



                                                                 LATGIBRANRIDROBERT TURNERN

ICE



POCT-GLUCOSE [id number] 21:13:00





             Test Item    Value        Reference Range Interpretation Comments

 

             POC-GLUCOSE METER 315 mg/dL           H            : TESTED A

T [facility] 67[address]



             (BEAKER) (test code =                                        [facility],



             1538)                                               [id number]:



                                                                 /Techni

christina ID



                                                                 = [id number] for Sm

marga,



                                                                 Nicki



POCT-GLUCOSE [id number] 21:13:00





             Test Item    Value        Reference Range Interpretation Comments

 

             POC-GLUCOSE METER 331 mg/dL           H            : TESTED A

T [facility] 6720



             (BEAKER) (test code =                                        [facility],



             1538)                                               [address]:



                                                                 /Techni

christina ID



                                                                 = [id number] for RO

OSCAR KUO



POCT-GLUCOSE [id number] 10:30:00





             Test Item    Value        Reference Range Interpretation Comments

 

             POC-GLUCOSE METER 341 mg/dL           H            : TESTED A

T [facility] 6720



             (BEAKER) (test code =                                        [facility],



             [facility]8)                                               [address]:



                                                                 /Techni

christina ID



                                                                 = [id number] for Sm

ith,



                                                                 Nicki



CBC W/PLT COUNT &amp; AUTO [id number] 08:48:00





             Test Item    Value        Reference Range Interpretation Comments

 

             WHITE BLOOD CELL COUNT (BEAKER) 6.7 K/ L     3.5-10.5              

    



             (test code = 775)                                        

 

             RED BLOOD CELL COUNT (BEAKER) 5.28 M/ L    4.63-6.08               

  



             (test code = 761)                                        

 

             HEMOGLOBIN (BEAKER) (test code = 14.6 GM/DL   13.7-17.5            

     



             410)                                                

 

             HEMATOCRIT (BEAKER) (test code = 44.9 %       40.1-51.0            

     



             411)                                                

 

             MEAN CORPUSCULAR VOLUME (BEAKER) 85.0 fL      79.0-92.2            

     



             (test code = 753)                                        

 

             MEAN CORPUSCULAR HEMOGLOBIN 27.7 pg      25.7-32.2                 



             (BEAKER) (test code = 751)                                        

 

             MEAN CORPUSCULAR HEMOGLOBIN CONC 32.5 GM/DL   32.3-36.5            

     



             (BEAKER) (test code = 752)                                        

 

             RED CELL DISTRIBUTION WIDTH 15.3 %       11.6-14.4    H            



             (BEAKER) (test code = 412)                                        

 

             PLATELET COUNT (BEAKER) (test 300 K/CU MM  150-450                 

  



             code = 756)                                         

 

             MEAN PLATELET VOLUME (BEAKER) 10.4 fL      9.4-12.4                

  



             (test code = 754)                                        

 

             NUCLEATED RED BLOOD CELLS 0 /100 WBC   0-0                       



             (BEAKER) (test code = 413)                                        



(MANUAL DIFFERENTIAL)2020 08:48:00





             Test Item    Value        Reference Range Interpretation Comments

 

             NEUTROPHILS - REL (DIFF) (BEAKER) 69 %                             

      



             (test code = 1359)                                        

 

             LYMPHOCYTES - REL (DIFF) (BEAKER) 25 %                             

      



             (test code = 1360)                                        

 

             MONOCYTES - REL (DIFF) (BEAKER) 3 %                                

    



             (test code = 1361)                                        

 

             EOSINOPHILS - REL (DIFF) (BEAKER) 3 %                              

      



             (test code = 1362)                                        

 

             BASOPHILS - REL (DIFF) (BEAKER) 0 %                                

    



             (test code = 1363)                                        

 

             NEUTROPHILS - ABS (DIFF) (BEAKER) 4.62 K/ L    1.80-8.00           

      



             (test code = 1365)                                        

 

             LYMPHOCYTES - ABS (DIFF) (BEAKER) 1.68 K/ L    1.48-4.50           

      



             (test code = 1366)                                        

 

             MONOCYTES - ABS (DIFF) (BEAKER) 0.20 K/ L    0.00-1.30             

    



             (test code = 1367)                                        

 

             EOSINOPHILS - ABS (DIFF) (BEAKER) 0.20 K/ L    0.00-0.50           

      



             (test code = 1368)                                        

 

             BASOPHILS - ABS (DIFF) (BEAKER) 0.00 K/ L    0.00-0.20             

    



             (test code = 1369)                                        

 

             TOTAL COUNTED (BEAKER) (test code = 100                            

        



             1351)                                               

 

             PLT MORPHOLOGY (BEAKER) (test code Normal                          

       



             = 486)                                              

 

             RBC MORPHOLOGY (BEAKER) (test code Normal                          

       



             = 762)                                              

 

             SMUDGE CELLS (BEAKER) (test code = Present                         

       



             1371)                                               



HEMOGLOBIN [id number] 06:39:00





             Test Item    Value        Reference Range Interpretation Comments

 

             HEMOGLOBIN A1C (BEAKER) (test code = 10.5 %       4.3-6.1      H   

         



             368)                                                



LIPID [id number] 04:57:00





             Test Item    Value        Reference Range Interpretation Comments

 

             TRIGLYCERIDES (BEAKER) 1251 mg/dL                             Speci

men moderately



             (test code = 540)                                        hemolyzed

 

             CHOLESTEROL (BEAKER) 215 mg/dL                              Specime

n moderately



             (test code = 631)                                        hemolyzed

 

             HDL CHOLESTEROL 22 mg/dL                               



             (BEAKER) (test code =                                        



             976)                                                



Calculated LDL not valid if triglyceride &gt;400 mg/dLTriglyceride Reference 
Range: Low Risk &lt;150Borderline 150-199 High Risk 200-499 Very High Risk 
&gt;=500Cholesterol Reference Range: Low Risk &lt;200 Borderline  200-239 High 
Risk &gt;240HDL Cholesterol Reference Range: Low Risk &gt;=60 High Risk 
&lt;40LDL Cholesterol Reference Range: Optimal &lt;100 Near Optimal 100-129 
Borderline 130-159 [id number] Very High &gt;=190 Specimen moderately lipemic
BASIC METABOLIC [id number] 04:56:00





             Test Item    Value        Reference Range Interpretation Comments

 

             SODIUM (BEAKER) 137 meq/L    136-145                   



             (test code = 381)                                        

 

             POTASSIUM (BEAKER) 4.6 meq/L    3.5-5.1                   Specimen 

moderately



             (test code = 379)                                        hemolyzed

 

             CHLORIDE (BEAKER) 106 meq/L                        



             (test code = 382)                                        

 

             CO2 (BEAKER) (test 20 meq/L     22-29        L            



             code = 355)                                         

 

             BLOOD UREA NITROGEN 12 mg/dL     7-21                      



             (BEAKER) (test code                                        



             = 354)                                              

 

             CREATININE (BEAKER) 0.95 mg/dL   0.57-1.25                 Specimen

 moderately



             (test code = 358)                                        hemolyzed

 

             GLUCOSE RANDOM 398 mg/dL           H            



             (BEAKER) (test code                                        



             = 652)                                              

 

             CALCIUM (BEAKER) 8.8 mg/dL    8.4-10.2                  



             (test code = 697)                                        

 

             EGFR (BEAKER) (test 110 mL/min/1.73                           ESTIM

ATED GFR IS



             code = 1092) sq m                                   NOT AS ACCURATE

 AS



                                                                 CREATININE



                                                                 CLEARANCE IN



                                                                 PREDICTING



                                                                 GLOMERULAR



                                                                 FILTRATION RATE

.



                                                                 ESTIMATED GFR I

S



                                                                 NOT APPLICABLE 

FOR



                                                                 DIALYSIS PATIEN

TS.



POCT-GLUCOSE [id number] 21:53:00





             Test Item    Value        Reference Range Interpretation Comments

 

             POC-GLUCOSE METER > mg/dL             HH           : Notified

 RN/MD: TESTED



             (BEAKER) (test code =                                        AT [facility] [address]



             0278)                                               [facility], [address]

[address]:



                                                                 /Techni

christina ID =



                                                                 [id number] for ZECHARIAH TRACY



U/S, ABDOMINAL, [id number] 19:54:00Reason for exam:-&gt;Evaluation of  
shunt and for possible cyst or pseudocyst Should this be performed at the 
bedside?-&gt;YesFINAL REPORT PATIENT ID: [id number] Limited abdominal ultrasound. 
CLINICAL HISTORY: Evaluation of  shunt for possible cyst or pseudocyst. 
COMPARISON STUDY: None available. FINDINGS: Sonographic assessment of the 
abdomen was performed assessing for fluid in the region of the patient's shunts.
No fluid collections are seen. However, the study is limited by the patient's 
body habitus. CT scan would be more sensitive. Signed: Madison Grewal MDReport 
Verified Date/Time: 2020 19:54:10 Reading Location: [address] Consult 
Reading Room Electronically signed by: MADISON GREWAL M.D. on 2020 07:54 
PMPOCT-GLUCOSE [id number] 19:34:00





             Test Item    Value        Reference Range Interpretation Comments

 

             POC-GLUCOSE METER 474 mg/dL           HH           : Notified

 RN/MD:



             (BEAKER) (test code =                                        TESTED

 AT [facility] 6785 9089)                                               [facility],



                                                                 [address]:



                                                                 /Techni

christina ID



                                                                 = [id number] for LONA URIOSTEGUI



URINALYSIS W/ REFLEX URINE [id number] 17:48:00





             Test Item    Value        Reference Range Interpretation Comments

 

             COLOR (BEAKER) (test code = 470) Yellow                            

     

 

             CLARITY (BEAKER) (test code = Hazy                                 

  



             469)                                                

 

             SPECIFIC GRAVITY UA (BEAKER) 1.020        1.001-1.035              

 



             (test code = 468)                                        

 

             PH UA (BEAKER) (test code = 467) 6.0          5.0-8.0              

     

 

             PROTEIN UA (BEAKER) (test code = 20 mg/dL     Negative     A       

     



             464)                                                

 

             GLUCOSE UA (BEAKER) (test code = >1000 mg/dL  Negative     A       

     



             365)                                                

 

             KETONES UA (BEAKER) (test code = 40 mg/dL     Negative     A       

     



             371)                                                

 

             BILIRUBIN UA (BEAKER) (test code Negative     Negative             

     



             = 462)                                              

 

             BLOOD UA (BEAKER) (test code = Moderate     Negative     A         

   



             461)                                                

 

             NITRITE UA (BEAKER) (test code = Positive     Negative     A       

     



             465)                                                

 

             LEUKOCYTE ESTERASE UA (BEAKER) Large        Negative     A         

   



             (test code = 466)                                        

 

             UROBILINOGEN UA (BEAKER) (test 0.2 mg/dL    0.2-1.0                

   



             code = 463)                                         

 

             RBC UA (BEAKER) (test code = 519) 0 /HPF                           

      

 

             WBC UA (BEAKER) (test code = 520) 267 /HPF                         

      

 

             BACTERIA (BEAKER) (test code = Moderate                            

   



             517)                                                

 

             SOURCE(BEAKER) (test code = 2795)                                  

      



CBC W/PLT COUNT &amp; AUTO [id number] 17:38:00





             Test Item    Value        Reference Range Interpretation Comments

 

             WHITE BLOOD CELL COUNT (BEAKER) 7.8 K/ L     3.5-10.5              

    



             (test code = 775)                                        

 

             RED BLOOD CELL COUNT (BEAKER) 5.12 M/ L    4.63-6.08               

  



             (test code = 761)                                        

 

             HEMOGLOBIN (BEAKER) (test code = 14.7 GM/DL   13.7-17.5            

     



             410)                                                

 

             HEMATOCRIT (BEAKER) (test code = 43.3 %       40.1-51.0            

     



             411)                                                

 

             MEAN CORPUSCULAR VOLUME (BEAKER) 84.6 fL      79.0-92.2            

     



             (test code = 753)                                        

 

             MEAN CORPUSCULAR HEMOGLOBIN 28.7 pg      25.7-32.2                 



             (BEAKER) (test code = 751)                                        

 

             MEAN CORPUSCULAR HEMOGLOBIN CONC 33.9 GM/DL   32.3-36.5            

     



             (BEAKER) (test code = 752)                                        

 

             RED CELL DISTRIBUTION WIDTH 15.3 %       11.6-14.4    H            



             (BEAKER) (test code = 412)                                        

 

             PLATELET COUNT (BEAKER) (test 344 K/CU MM  150-450                 

  



             code = 756)                                         

 

             MEAN PLATELET VOLUME (BEAKER) 11.0 fL      9.4-12.4                

  



             (test code = 754)                                        

 

             NUCLEATED RED BLOOD CELLS 0 /100 WBC   0-0                       



             (BEAKER) (test code = 413)                                        



(CELLAVISION MANUAL DIFF)2020 17:38:00





             Test Item    Value        Reference Range Interpretation Comments

 

             NEUTROPHILS - REL 63 %                                   



             (CELLAVISION)(BEAKER) (test code                                   

     



             = 2816)                                             

 

             LYMPHOCYTES - REL 23 %                                   



             (CELLAVISION)(BEAKER) (test code                                   

     



             = 2817)                                             

 

             MONOCYTES - REL 6 %                                    



             (CELLAVISION)(BEAKER) (test code                                   

     



             = 2818)                                             

 

             EOSINOPHILS - REL 5 %                                    



             (CELLAVISION)(BEAKER) (test code                                   

     



             = 2819)                                             

 

             BASOPHILS - REL 1 %                                    



             (CELLAVISION)(BEAKER) (test code                                   

     



             = 2820)                                             

 

             BANDS - REL (CELLAVISION)(BEAKER) 2 %          0-10                

      



             (test code = 2826)                                        

 

             NEUTROPHILS - ABS 4.91 K/ul    1.78-5.38                 



             (CELLAVISION)(BEAKER) (test code                                   

     



             = 2830)                                             

 

             LYMPHOCYTES - ABS 1.79 K/ul    1.32-3.57                 



             (CELLAVISION)(BEAKER) (test code                                   

     



             = 2831)                                             

 

             MONOCYTES - ABS 0.47 K/uL    0.30-0.82                 



             (CELLAVISION)(BEAKER) (test code                                   

     



             = 2832)                                             

 

             EOSINOPHILS - ABS 0.39 K/uL    0.04-0.54                 



             (CELLAVISION)(BEAKER) (test code                                   

     



             = 2834)                                             

 

             BASOPHILS - ABS 0.08 K/uL    0.01-0.08                 



             (CELLAVISION)(BEAKER) (test code                                   

     



             = 2835)                                             

 

             BANDS - ABS (CELLAVISION)(BEAKER) 0.16 K/uL    0.00-0.80           

      



             (test code = 2840)                                        

 

             TOTAL COUNTED (BEAKER) (test code 100                              

      



             = 1351)                                             

 

             SMUDGE CELLS (BEAKER) (test code Present                           

     



             = 1371)                                             

 

             GIANT PLATELETS (BEAKER) (test Present                             

   



             code = 313)                                         

 

             ANISOCYTOSIS (BEAKER) (test code 1+ few                            

     



             = 961)                                              

 

             POIKILOCYTES (BEAKER) (test code 2+ moderate                       

     



             = 966)                                              

 

             SPHEROCYTES (BEAKER) (test code = 1+ few                           

      



             768)                                                

 

             OVALOCYTES (BEAKER) (test code = 1+ few                            

     



             477)                                                

 

             TEAR DROP CELLS (BEAKER) (test 1+ few                              

   



             code = 481)                                         

 

             ARTIFACT (CELLAVISION)(BEAKER) Present                             

   



             (test code = 3432)                                        

 

             PLATELET CONCENTRATION Adequate                               



             (CELLAVISION)(BEAKER) (test code                                   

     



             = 3438)                                             



Received comment: User comments: Slide comments:POCT-GLUCOSE [id number] 
16:27:00





             Test Item    Value        Reference Range Interpretation Comments

 

             POC-GLUCOSE METER 424 mg/dL           HH           : Notified

 RN/MD:



             (KUN) (test code =                                        TESTED

 AT [facility] 6787 4358)                                               [place] [facility],



                                                                 [address]:



                                                                 /Techni

christina ID



                                                                 = [id number] for AK

INSONU,



                                                                 LONA



CT, BRAIN, WITHOUT [id number] 15:31:00FINAL REPORT PATIENT ID: [id number] 
CT, BRAIN, WITHOUT CONTRAST CLINICAL INDICATION: Hydrocephalus COMPARISON: None 
TECHNIQUE: Noncontrast axial CT imaging of the brain and skull. DOSE REDUCTION: 
Dose modulation, iterative reconstruction, and/or weight-based adjustment of the
mA/kV was utilized to reduce the radiation dose to as low as reasonably 
achievable. FINDINGS:A total of two ventricular drainagecatheters are present. A
right transverse temporal catheter terminates across the midline just abovethe 
level of the foramen of Monro. A right parietal approach catheter terminates in 
the pineal region. Supratentorial ventricular configuration is slitlike. There 
is no herniation. The basilar cisternsare preserved. There is no identifiable 
recent infarct. 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 
partial callosal agenesis. Calvarial configuration escape of cephalic. There is 
no acute osseous abnormality. IMPRESSION: Chronic, likely congenital parenchymal
changes without recent infarct or hemorrhage. A total of two ventricular 
drainage catheters are present. Supratentorial ventricular configuration is 
slitlike and may represent over shunting. Correlation recommended. Signed:
JR Rae Robert MDReport Verified Date/Time: 2020 15:31:08 
Reading Location: [address] Neuro Reading Room Electronically signed by: 
ALONDRA RAE on 2020 03:31 PMRAD, SHUNT [id number] 
15:13:00Reason for exam:-&gt;concern for VPS malfunctionFINAL REPORT PATIENT ID:
[id number] RAD, SHUNT SERIES INDICATION: concern for VPS malfunction COMPARISON: 
None TECHNIQUE: AP and lateral radiographs of the skull, abdomen and chest were 
acquired to evaluate shunt catheter FINDINGS:An abandoned shunt catheter is 
present within the right neck. Medial to this a second shunt catheter is present
which descends along the left chest wall, terminating within the mid pelvis. 
Radiopaque portions of the catheter appear contiguous. Signed: Lisa Reyes Verified Date/Time: 2020 15:13:54 Reading Location:  [facility]
Radiology Reading Room Electronically signed by: LISA REYES MD on
2020 03:13 PMPOCT-GLUCOSE [id number] 11:38:00





             Test Item    Value        Reference Range Interpretation Comments

 

             POC-GLUCOSE METER 394 mg/dL           H            : TESTED A

T [facility] [address]



             (BEAKER) (test code =                                        [facility],



             1538)                                               [id number]:



                                                                 /Techni

christina ID



                                                                 = [id number] for LONA URIOSTEGUI



HEMOGLOBIN [id number] 09:15:00





             Test Item    Value        Reference Range Interpretation Comments

 

             HEMOGLOBIN A1C (BEAKER) (test code = 10.4 %       4.3-6.1      H   

         



             368)                                                



TSH/FREE T4 IF [id number] 07:54:00





             Test Item    Value        Reference Range Interpretation Comments

 

             THYROID STIMULATING HORMONE 1.40 uIU/mL  0.35-4.94                 



             (BEAKER) (test code = 772)                                        



POCT-GLUCOSE [id number] 07:48:00





             Test Item    Value        Reference Range Interpretation Comments

 

             POC-GLUCOSE METER > mg/dL             HH           : Notified

 RN/MD: TESTED



             (BEAKER) (test code =                                        AT [facility] [address] [facility]



             1538)                                               [facility], [address]

30:



                                                                 /Techni

christina ID =



                                                                 [id number] for LONA GOLDSMITH



BASIC METABOLIC [id number] 07:44:00





             Test Item    Value        Reference Range Interpretation Comments

 

             SODIUM (BEAKER) 134 meq/L    136-145      L            



             (test code = 381)                                        

 

             POTASSIUM (BEAKER) 4.4 meq/L    3.5-5.1                   



             (test code = 379)                                        

 

             CHLORIDE (BEAKER) 103 meq/L                        



             (test code = 382)                                        

 

             CO2 (BEAKER) (test 20 meq/L     22-29        L            



             code = 355)                                         

 

             BLOOD UREA NITROGEN 17 mg/dL     7-21                      



             (BEAKER) (test code                                        



             = 354)                                              

 

             CREATININE (BEAKER) 1.09 mg/dL   0.57-1.25                 



             (test code = 358)                                        

 

             GLUCOSE RANDOM 464 mg/dL           HH           



             (BEAKER) (test code                                        



             = 652)                                              

 

             CALCIUM (BEAKER) 8.6 mg/dL    8.4-10.2                  



             (test code = 697)                                        

 

             EGFR (BEAKER) (test 94 mL/min/1.73                           ESTIMA

HUMA GFR IS



             code = 1092) sq m                                   NOT AS ACCURATE

 AS



                                                                 CREATININE



                                                                 CLEARANCE IN



                                                                 PREDICTING



                                                                 GLOMERULAR



                                                                 FILTRATION RATE

.



                                                                 ESTIMATED GFR I

S



                                                                 NOT APPLICABLE 

FOR



                                                                 DIALYSIS PATIEN

TS.



LIPID [id number] 07:34:00





             Test Item    Value        Reference Range Interpretation Comments

 

             TRIGLYCERIDES (BEAKER) (test code = 750 mg/dL                      

        



             540)                                                

 

             CHOLESTEROL (BEAKER) (test code = 196 mg/dL                        

      



             631)                                                

 

             HDL CHOLESTEROL (BEAKER) (test code 22 mg/dL                       

        



             = 976)                                              



Calculated LDL not valid if triglyceride &gt;400 mg/dLTriglyceride Reference 
Range: Low Risk &lt;150Borderline 150-199 High Risk 200-499 Very High Risk 
&gt;=500Cholesterol Reference Range: Low Risk &lt;200 Borderline 200-239 High 
Risk &gt;240HDL Cholesterol Reference Range: Low Risk &gt;=60 High Risk&lt;40LDL
Cholesterol Reference Range: Optimal  &lt;100 Near Optimal 100-129 Borderline 
130-159 Dxcl082-393 Very High &gt;=190POCT-GLUCOSE [id number] 00:49:00





             Test Item    Value        Reference Range Interpretation Comments

 

             POC-GLUCOSE METER 399 mg/dL           H            : TESTED A

T [facility] [address]



             (KUN) (test code =                                        M[facility] NEL[facility],



             1538)                                               [address]:



                                                                 /Techni

christina ID



                                                                 = [id number] for CLAY BATRES



RAD, FOOT, 2 VIEWS, [id number] 22:28:00Reason for exam:-&gt;osteomyletitis
FINAL REPORT PATIENT ID: [id number] TECHNIQUE: Two views each of the bilateral 
feet HISTORY: osteomyletitis. COMPARISON: None. IMPRESSION:No acute displaced 
fracture or dislocation. Joint spaces are within normal limits.Severe soft 
tissue swelling.On the right subcentimeter nonspecific calcifications adjacent 
to the heel plantar aspect. Correlate with physical exam. Severely limited exam 
due to patientbody habitus. No definite cortical irregularity.There is clinical 
concern for osteomyelitis, recommend MRI with contrast. Signed: Sriram Valera 
MDReport Verified Date/Time: 2020 22:28:25 Reading Location: [address] 
Consult Reading Room Electronically signed by: SRIRAM VALERA DO on
2020 10:28 PMRAD, FOOT, 2 VIEWS, [id number] 22:28:00Reason for 
exam:-&gt;osteomyletitsFINAL REPORT PATIENT ID: [id number] TECHNIQUE: Two views 
each of the bilateral feet HISTORY: osteomyletitis. COMPARISON: None. 
IMPRESSION:No acute displaced fracture or dislocation. Joint spaces are within 
normal limits.Severe soft tissue swelling.On the right subcentimeter nonspecific
calcifications adjacent to the heel plantar aspect. Correlate with physical 
exam. Severely limited exam due to patientbody habitus. No definite cortical 
irregularity.There is clinical concern for osteomyelitis, recommend MRI with 
contrast. Signed: Sriram Valera MDReport Verified Date/Time: 2020 22:28:25
Reading Location: [facility] C013W Consult Reading Room Electronically signed by: 
SRIRAM VALERA DO on2020 10:28 PMPOCT-GLUCOSE [id number] 
21:04:00





             Test Item    Value        Reference Range Interpretation Comments

 

             POC-GLUCOSE METER 430 mg/dL           HH           : Notified

 RN/MD:



             (KUN) (test code =                                        TESTED

 AT [facility] [address]



             1538)                                               [facility],



                                                                 [id number]:



                                                                 /Techni

christina ID



                                                                 = [id number] for DEYSI ADRIAN



ERTAPENEM:SUSC:PT:ISOLATE:ORDQN:[id number] 16:48:00





             Test Item    Value        Reference Range Interpretation Comments

 

             Culture: Urine (test >100,000 CFU/mL Proteus                       

    



             code = Culture: mirabilis 10,000 -                           



             Urine)       50,000 CFU/mL Skin Jaime                           



[facility]PT:ISOLATE:ORDQN:[id number] 16:48:00





             Test Item    Value        Reference Range Interpretation Comments

 

             Proteus mirabilis (test Proteus mirabilis                          

 



             code = Proteus mirabilis)                                        



[facility] AND [id number] 16:48:00





             Test Item    Value        Reference Range Interpretation Comments

 

             UA Nitrite (test code Negative (18 10:48                      

     



             = UA Nitrite) AM)                                    



[facility] [id number] 16:48:00





             Test Item    Value        Reference Range Interpretation Comments

 

             UA Bili (test code = Negative *NA*(18                         

  



             UA Bili)     10:48 AM)                              



[facility] AND [id number] 16:48:00





             Test Item    Value        Reference Range Interpretation Comments

 

             UA Ketones (test code Negative *NA*(18                        

   



             = UA Ketones) 10:48 AM)                              



[facility] AND [id number] 16:48:00





             Test Item    Value        Reference Range Interpretation Comments

 

             UA Blood (test code = Trace *ABN*(18                          

 



             UA Blood)    10:48 AM)                              



[facility] AND [id number] 16:48:00





             Test Item    Value        Reference Range Interpretation Comments

 

             UA Urobilinogen (test code = UA 0.2          0.1-1.0               

    



             Urobilinogen)                                        



[facility] AND [id number] 16:48:00





             Test Item    Value        Reference Range Interpretation Comments

 

             UA Leuk Est (test code Large *ABN*(18                         

  



             = UA Leuk Est) 10:48 AM)                              



[facility] AND [id number] 16:48:00





             Test Item    Value        Reference Range Interpretation Comments

 

             UA Protein (test code Negative (18 10:48                      

     



             = UA Protein) AM)                                    



[facility] AND [id number] 16:48:00





             Test Item    Value        Reference Range Interpretation Comments

 

             UA Glucose (test code Negative (18 10:48                      

     



             = UA Glucose) AM)                                    



[facility] AND [id number] 16:48:00





             Test Item    Value        Reference Range Interpretation Comments

 

             UA pH (test code = UA pH) 7.0 1        5.0-8.0                   



[facility] AND [id number] 16:48:00





             Test Item    Value        Reference Range Interpretation Comments

 

             UA Spec Grav (test code = UA Spec 1.015 1                          

      



             Grav)                                               



[facility] AND [id number] 16:48:00





             Test Item    Value        Reference Range Interpretation Comments

 

             UA Color (test code = Yellow *NA*(18                          

 



             UA Color)    10:48 AM)                              



[facility] AND [id number] 16:48:00





             Test Item    Value        Reference Range Interpretation Comments

 

             UA Turbidity (test code = Clear (18 10:48                     

      



             UA Turbidity) AM)                                    



[facility] AND [id number] 16:48:00





             Test Item    Value        Reference Range Interpretation Comments

 

             UA Mucus (test code = UA Mucus) Few /LPF                           

    



[facility] AND [id number] 16:48:00





             Test Item    Value        Reference Range Interpretation Comments

 

             UA Bacteria (test code = UA Few /HPF                               



             Bacteria)                                           



[facility] AND [id number] 16:48:00





             Test Item    Value        Reference Range Interpretation Comments

 

             UA RBC (test code = 0-2 /HPF     See_Comment                [Automa

huma message] The



             UA RBC)                                             system which ge

nerated



                                                                 this result tra

nsmitted



                                                                 reference range

: <=2. The



                                                                 reference range

 was not



                                                                 used to interpr

et this



                                                                 result as zayda

l/abnormal.



[facility] AND [id number] 16:48:00





             Test Item    Value        Reference Range Interpretation Comments

 

             UA Sq Epi (test code = None Seen (18                          

 



             UA Sq Epi)   10:48 AM)                              



[facility] AND [id number] 16:48:00





             Test Item    Value        Reference Range Interpretation Comments

 

             UA WBC (test code = UA WBC)  /HPF                            



[facility]ORDQN:[id number] 16:48:00





             Test Item    Value        Reference Range Interpretation Comments

 

             Culture: Urine (test >100,000 CFU/mL Proteus                       

    



             code = Culture: mirabilis 10,000 -                           



             Urine)       50,000 CFU/mL Skin Jaime                           



[facility]:PT:ISOLATE:ORDQN:[id number] 16:48:00





             Test Item    Value        Reference Range Interpretation Comments

 

             Proteus mirabilis (test Proteus mirabilis                          

 



             code = Proteus mirabilis)                                        



[facility] AND [id number] 16:48:00





             Test Item    Value        Reference Range Interpretation Comments

 

             UA Nitrite (test code Negative (18 10:48                      

     



             = UA Nitrite) AM)                                    



[facility] AND [id number] 16:48:00





             Test Item    Value        Reference Range Interpretation Comments

 

             UA Bili (test code = Negative *NA*(18                         

  



             UA Bili)     10:48 AM)                              



[facility] AND [id number] 16:48:00





             Test Item    Value        Reference Range Interpretation Comments

 

             UA Ketones (test code Negative *NA*(18                        

   



             = UA Ketones) 10:48 AM)                              



[facility] AND [id number] 16:48:00





             Test Item    Value        Reference Range Interpretation Comments

 

             UA Blood (test code = Trace *ABN*(18                          

 



             UA Blood)    10:48 AM)                              



[facility] AND [id number] 16:48:00





             Test Item    Value        Reference Range Interpretation Comments

 

             UA Urobilinogen (test code = UA 0.2          0.1-1.0               

    



             Urobilinogen)                                        



[facility] AND [id number] 16:48:00





             Test Item    Value        Reference Range Interpretation Comments

 

             UA Leuk Est (test code Large *ABN*(18                         

  



             = UA Leuk Est) 10:48 AM)                              



[facility] AND [id number] 16:48:00





             Test Item    Value        Reference Range Interpretation Comments

 

             UA Protein (test code Negative (18 10:48                      

     



             = UA Protein) AM)                                    



[facility] AND [id number] 16:48:00





             Test Item    Value        Reference Range Interpretation Comments

 

             UA Glucose (test code Negative (18 10:48                      

     



             = UA Glucose) AM)                                    



[facility] AND [id number] 16:48:00





             Test Item    Value        Reference Range Interpretation Comments

 

             UA pH (test code = UA pH) 7.0 1        5.0-8.0                   



[facility] AND [id number] 16:48:00





             Test Item    Value        Reference Range Interpretation Comments

 

             UA Spec Grav (test code = UA Spec 1.015 1                          

      



             Grav)                                               



[facility] AND [id number] 16:48:00





             Test Item    Value        Reference Range Interpretation Comments

 

             UA Color (test code = Yellow *NA*(18                          

 



             UA Color)    10:48 AM)                              



[facility] AND [id number] 16:48:00





             Test Item    Value        Reference Range Interpretation Comments

 

             UA Turbidity (test code = Clear (18 10:48                     

      



             UA Turbidity) AM)                                    



[facility] AND [id number] 16:48:00





             Test Item    Value        Reference Range Interpretation Comments

 

             UA Mucus (test code = UA Mucus) Few /LPF                           

    



[facility] AND [id number] 16:48:00





             Test Item    Value        Reference Range Interpretation Comments

 

             UA Bacteria (test code = UA Few /HPF                               



             Bacteria)                                           



[facility] AND [id number] 16:48:00





             Test Item    Value        Reference Range Interpretation Comments

 

             UA RBC (test code = 0-2 /HPF     See_Comment                [Automa

huma message] The



             UA RBC)                                             system which ge

nerated



                                                                 this result tra

nsmitted



                                                                 reference range

: <=2. The



                                                                 reference range

 was not



                                                                 used to interpr

et this



                                                                 result as zayda

l/abnormal.



[facility] AND [id number] 16:48:00





             Test Item    Value        Reference Range Interpretation Comments

 

             UA Sq Epi (test code = None Seen (18                          

 



             UA Sq Epi)   10:48 AM)                              



[facility] [id number] 16:48:00





             Test Item    Value        Reference Range Interpretation Comments

 

             UA WBC (test code = UA WBC)  /HPF                            



[facility]PT:ISOLATE:ORDQN:[id number] 16:48:00





             Test Item    Value        Reference Range Interpretation Comments

 

             Culture: Urine (test >100,000 CFU/mL Proteus                       

    



             code = Culture: mirabilis 10,000 -                           



             Urine)       50,000 CFU/mL Skin Jaime                           



[facility]:PT:ISOLATE:ORDQN:[id number] 16:48:00





             Test Item    Value        Reference Range Interpretation Comments

 

             Proteus mirabilis (test Proteus mirabilis                          

 



             code = Proteus mirabilis)                                        



[facility] AND [id number] 16:48:00





             Test Item    Value        Reference Range Interpretation Comments

 

             UA Nitrite (test code Negative (18 10:48                      

     



             = UA Nitrite) AM)                                    



[facility] AND [id number] 16:48:00





             Test Item    Value        Reference Range Interpretation Comments

 

             UA Bili (test code = Negative *NA*(18                         

  



             UA Bili)     10:48 AM)                              



[facility] AND [id number] 16:48:00





             Test Item    Value        Reference Range Interpretation Comments

 

             UA Ketones (test code Negative *NA*(18                        

   



             = UA Ketones) 10:48 AM)                              



[facility] AND [id number] 16:48:00





             Test Item    Value        Reference Range Interpretation Comments

 

             UA Blood (test code = Trace *ABN*(18                          

 



             UA Blood)    10:48 AM)                              



[facility] AND [id number] 16:48:00





             Test Item    Value        Reference Range Interpretation Comments

 

             UA Urobilinogen (test code = UA 0.2          0.1-1.0               

    



             Urobilinogen)                                        



[facility] AND [id number] 16:48:00





             Test Item    Value        Reference Range Interpretation Comments

 

             UA Leuk Est (test code Large *ABN*(18                         

  



             = UA Leuk Est) 10:48 AM)                              



[facility] AND [id number] 16:48:00





             Test Item    Value        Reference Range Interpretation Comments

 

             UA Protein (test code Negative (18 10:48                      

     



             = UA Protein) AM)                                    



[facility] AND [id number] 16:48:00





             Test Item    Value        Reference Range Interpretation Comments

 

             UA Glucose (test code Negative (18 10:48                      

     



             = UA Glucose) AM)                                    



[facility] AND [id number] 16:48:00





             Test Item    Value        Reference Range Interpretation Comments

 

             UA pH (test code = UA pH) 7.0 1        5.0-8.0                   



[facility] AND [id number] 16:48:00





             Test Item    Value        Reference Range Interpretation Comments

 

             UA Spec Grav (test code = UA Spec 1.015 1                          

      



             Grav)                                               



[facility] AND [id number] 16:48:00





             Test Item    Value        Reference Range Interpretation Comments

 

             UA Color (test code = Yellow *NA*(18                          

 



             UA Color)    10:48 AM)                              



[facility] AND [id number] 16:48:00





             Test Item    Value        Reference Range Interpretation Comments

 

             UA Turbidity (test code = Clear (18 10:48                     

      



             UA Turbidity) AM)                                    



[facility] AND [id number] 16:48:00





             Test Item    Value        Reference Range Interpretation Comments

 

             UA Mucus (test code = UA Mucus) Few /LPF                           

    



[facility] AND [id number] 16:48:00





             Test Item    Value        Reference Range Interpretation Comments

 

             UA Bacteria (test code = UA Few /HPF                               



             Bacteria)                                           



[facility] AND [id number] 16:48:00





             Test Item    Value        Reference Range Interpretation Comments

 

             UA RBC (test code = 0-2 /HPF     See_Comment                [Automa

huma message] The



             UA RBC)                                             system which ge

nerated



                                                                 this result tra

nsmitted



                                                                 reference range

: <=2. The



                                                                 reference range

 was not



                                                                 used to interpr

et this



                                                                 result as zayda

l/abnormal.



[facility] AND [id number] 16:48:00





             Test Item    Value        Reference Range Interpretation Comments

 

             UA Sq Epi (test code = None Seen (18                          

 



             UA Sq Epi)   10:48 AM)                              



[facility] AND [id number] 16:48:00





             Test Item    Value        Reference Range Interpretation Comments

 

             UA WBC (test code = UA WBC)  /HPF                            



[facility]





             Test Item    Value        Reference Range Interpretation Comments

 

             Antibody Scrn (test Negative (18 5:57                         

  



             code = Antibody Scrn) AM)                                    



[facility]  11:57:00





             Test Item    Value        Reference Range Interpretation Comments

 

             ABO/Rh (test code = ABO/Rh) AB POS                                 



[id number] 11:57:00





             Test Item    Value        Reference Range Interpretation Comments

 

             PTT (test code = PTT) 33.4 s       22.9-35.8                 



[id number] 11:57:00





             Test Item    Value        Reference Range Interpretation Comments

 

             PT (test code = PT) 13.7 s       12.0-14.7                 



[id number] 11:57:00





             Test Item    Value        Reference Range Interpretation Comments

 

             INR (test code = INR) 1.05 1       0.85-1.17                 



[facility]  11:57:00





             Test Item    Value        Reference Range Interpretation Comments

 

             Antibody Scrn (test Negative (18 5:57                         

  



             code = Antibody Scrn) AM)                                    



[facility]  11:57:00





             Test Item    Value        Reference Range Interpretation Comments

 

             ABO/Rh (test code = ABO/Rh) AB POS                                 



[id number] 11:57:00





             Test Item    Value        Reference Range Interpretation Comments

 

             PTT (test code = PTT) 33.4 s       22.9-35.8                 



[id number] 11:57:00





             Test Item    Value        Reference Range Interpretation Comments

 

             PT (test code = PT) 13.7 s       12.0-14.7                 



[facility] Steph[id number] 11:57:00





             Test Item    Value        Reference Range Interpretation Comments

 

             INR (test code = INR) 1.05 1       0.85-1.17                 



[facility]  11:57:00





             Test Item    Value        Reference Range Interpretation Comments

 

             Antibody Scrn (test Negative (18 5:57                         

  



             code = Antibody Scrn) AM)                                    



[facility]  11:57:00





             Test Item    Value        Reference Range Interpretation Comments

 

             ABO/Rh (test code = ABO/Rh) AB POS                                 



[id number] 11:57:00





             Test Item    Value        Reference Range Interpretation Comments

 

             PTT (test code = PTT) 33.4 s       22.9-35.8                 



[facility][id number] 11:57:00





             Test Item    Value        Reference Range Interpretation Comments

 

             PT (test code = PT) 13.7 s       12.0-14.7                 



[facility] StephenvilleLbbveyeGIZMKMTAEL2155-81-02 11:57:00





             Test Item    Value        Reference Range Interpretation Comments

 

             INR (test code = INR) 1.05 1       0.85-1.17                 



[facility]2018-11-08 10:50:01





             Test Item    Value        Reference Range Interpretation Comments

 

             eGFR (test code = eGFR) 133                                    



[facility]2018-11-08 10:50:01





             Test Item    Value        Reference Range Interpretation Comments

 

             Calcium Lvl (test code = Calcium Lvl) 9.4          8.5-10.5        

          



[facility]2018-11-08 10:50:01





             Test Item    Value        Reference Range Interpretation Comments

 

             CO2 (test code = CO2) 28           24-32                     



[facility]2018-11-08 10:50:01





             Test Item    Value        Reference Range Interpretation Comments

 

             BUN (test code = BUN) 14           7-22                      



[facility]2018-11-08 10:50:01





             Test Item    Value        Reference Range Interpretation Comments

 

             Glucose Lvl (test code = Glucose Lvl) 89           70-99           

          



[facility]2018-11-08 10:50:01





             Test Item    Value        Reference Range Interpretation Comments

 

             Chloride Lvl (test code = Chloride Lvl) 104                  

            



[facility]2018-11-08 10:50:01





             Test Item    Value        Reference Range Interpretation Comments

 

             Potassium Lvl (test code = Potassium 4.2          3.5-5.1          

         



             Lvl)                                                



[facility]2018-11-08 10:50:01





             Test Item    Value        Reference Range Interpretation Comments

 

             Sodium Lvl (test code = Sodium Lvl) 137          135-145           

        



[facility]2018-11-08 10:50:01





             Test Item    Value        Reference Range Interpretation Comments

 

             Creatinine Lvl (test code = Creatinine 0.85         0.50-1.40      

           



             Lvl)                                                



[facility]2018-11-08 10:50:01





             Test Item    Value        Reference Range Interpretation Comments

 

             AGAP (test code = AGAP) 9.2          10.0-20.0                 



[id number] 10:50:01





             Test Item    Value        Reference Range Interpretation Comments

 

             ACT (TEG) Rapid (test code = ACT (TEG) 136 s                 

           



             Rapid)                                              



[id number] 10:50:01





             Test Item    Value        Reference Range Interpretation Comments

 

             Split Point Rapid (test code = Split 0.6 min                       

         



             Point Rapid)                                        



[facility] Steph[id number] 10:50:01





             Test Item    Value        Reference Range Interpretation Comments

 

             R-time Rapid (test code = R-time 0.9 min      0.4-0.7              

     



             Rapid)                                              



[facility] Stephe[id number] 10:50:01





             Test Item    Value        Reference Range Interpretation Comments

 

             K-time Rapid (test code = K-time 1.4 min      0.6-2.3              

     



             Rapid)                                              



[facility]villeHnuzaweBKFRTRRYVX7529-61-13 10:50:01





             Test Item    Value        Reference Range Interpretation Comments

 

             Angle Rapid (test code = Angle 71 degrees   64-80                  

   



             Rapid)                                              



[facility] StephenvilleFafudapLEKWTBNTQJ6381-05-30 10:50:01





             Test Item    Value        Reference Range Interpretation Comments

 

             G-value Rapid (test code = G-value 12.7         5.0-11.6           

       



             Rapid)                                              



[facility]villeQidwcgyRUZHJYDXEB7760-05-00 10:50:01





             Test Item    Value        Reference Range Interpretation Comments

 

             Max Amplitude Rapid (test code = Max 72 mm        52-71            

         



             Amplitude Rapid)                                        



[facility]villeCmwwzvdINJRGRREJO5717-35-05 10:50:01





             Test Item    Value        Reference Range Interpretation Comments

 

             Estimated % Lysis Rapid 0.1          See_Comment                [Au

tomated message] The



             (test code = Estimated                                        syste

m which generated



             % Lysis Rapid)                                        this result t

ransmitted



                                                                 reference range

: <=7.5.



                                                                 The reference r

zhen was



                                                                 not used to int

erpret



                                                                 this result as



                                                                 normal/abnormal

.



[facility] Stephenvil[id number] 10:50:01





             Test Item    Value        Reference Range Interpretation Comments

 

             Platelet (test code = Platelet) 367          133-450               

    



[facility] StephenvilleLaebtnzNCJXFLWXCD8679-48-31 10:50:01





             Test Item    Value        Reference Range Interpretation Comments

 

             MPV (test code = MPV) 7.8          7.4-10.4                  



[facility] StephenvilleQisqzggHSXUFBWDCO8135-98-71 10:50:01





             Test Item    Value        Reference Range Interpretation Comments

 

             MCH (test code = MCH) 27.4 pg      27.0-31.0                 



[facility] Stephe[id number] 10:50:01





             Test Item    Value        Reference Range Interpretation Comments

 

             MCV (test code = MCV) 80.7         80.0-94.0                 



[facility] StephenvilleXaxohfrZDYJOIDJZL8919-38-23 10:50:01





             Test Item    Value        Reference Range Interpretation Comments

 

             MCHC (test code = MCHC) 34.0         32.0-36.0                 



[facility] Stephe[id number] 10:50:01





             Test Item    Value        Reference Range Interpretation Comments

 

             RDW (test code = RDW) 18.9         11.5-14.5                 



[facility] StephenvilleAjjwpnoQMETOUGKIV4962-08-99 10:50:01





             Test Item    Value        Reference Range Interpretation Comments

 

             Hct (test code = Hct) 43.3         42.0-54.0                 



[facility] StephenvilleIisqgamYUKIEELJPF3212-43-25 10:50:01





             Test Item    Value        Reference Range Interpretation Comments

 

             WBC (test code = WBC) 9.3          3.7-10.4                  



[facility] StephenvilleNtevedePRDWZOMKGW2677-51-40 10:50:01





             Test Item    Value        Reference Range Interpretation Comments

 

             Hgb (test code = Hgb) 14.7         14.0-18.0                 



[facility] StephenvilleCgrmqdzYYKCFOWNOR6867-67-38 10:50:01





             Test Item    Value        Reference Range Interpretation Comments

 

             RBC (test code = RBC) 5.36         4.70-6.10                 



[facility] StephenvilleRmikkjcFSMFBJYHKX2966-23-01 10:50:01





             Test Item    Value        Reference Range Interpretation Comments

 

             Eosinophils # (test code 0.2          See_Comment                [A

utomated message] The



             = Eosinophils #)                                        system whic

h generated



                                                                 this result tra

nsmitted



                                                                 reference range

: <=0.5.



                                                                 The reference r

zhen was



                                                                 not used to int

erpret



                                                                 this result as



                                                                 normal/abnormal

.



[facility] StephenvilleSymmvxmYCHWNIQDWT7998-05-60 10:50:01





             Test Item    Value        Reference Range Interpretation Comments

 

             Basophils # (test code 0.1          See_Comment                [Aut

omated message] The



             = Basophils #)                                        system which 

generated



                                                                 this result tra

nsmitted



                                                                 reference range

: <=0.2.



                                                                 The reference r

zhen was



                                                                 not used to int

erpret



                                                                 this result as



                                                                 normal/abnormal

.



[facility] StephenvilleOfauzmhSCEJUJMBKT3263-43-45 10:50:01





             Test Item    Value        Reference Range Interpretation Comments

 

             Lymphocytes # (test code = Lymphocytes 1.8          1.0-5.5        

           



             #)                                                  



[facility] StephenvilleNxxpspwIAJCLEHLJQ4346-75-22 10:50:01





             Test Item    Value        Reference Range Interpretation Comments

 

             Monocytes # (test code 0.9          See_Comment                [Aut

omated message] The



             = Monocytes #)                                        system which 

generated



                                                                 this result tra

nsmitted



                                                                 reference range

: <=0.8.



                                                                 The reference r

zhen was



                                                                 not used to int

erpret



                                                                 this result as



                                                                 normal/abnormal

.



[facility] StephenvilleTcgivdxDCANKJQAEA8713-24-59 10:50:01





             Test Item    Value        Reference Range Interpretation Comments

 

             Neutrophils # (test code = Neutrophils 6.3          1.5-8.1        

           



             #)                                                  



[facility] StephenvilleNhmplxrWGOLZKSAOM5308-88-64 10:50:01





             Test Item    Value        Reference Range Interpretation Comments

 

             Eosinophils (test code = 2.0          See_Comment                [A

utomated message] The



             Eosinophils)                                        system which ge

nerated



                                                                 this result tra

nsmitted



                                                                 reference range

: <=4.0.



                                                                 The reference r

zhen was



                                                                 not used to int

erpret



                                                                 this result as



                                                                 normal/abnormal

.



[facility] StephenvilleXbcmvkuUKGZHLFRJT7820-18-87 10:50:01





             Test Item    Value        Reference Range Interpretation Comments

 

             Segs (test code = Segs) 67.4         45.0-75.0                 



[facility] StephenvilleJshnasoIAZEWGOYTS6771-38-27 10:50:01





             Test Item    Value        Reference Range Interpretation Comments

 

             Lymphocytes (test code = Lymphocytes) 19.9         20.0-40.0       

          



[facility] StephenvilleWpyozwqKEZNDMRJSC3503-26-67 10:50:01





             Test Item    Value        Reference Range Interpretation Comments

 

             Basophils (test code = 1.0          See_Comment                [Aut

omated message] The



             Basophils)                                          system which ge

nerated



                                                                 this result tra

nsmitted



                                                                 reference range

: <=1.0.



                                                                 The reference r

zhen was



                                                                 not used to int

erpret



                                                                 this result as



                                                                 normal/abnormal

.



[facility] StephenvilleGkzbgerOOIQPQSOQJ1344-40-63 10:50:01





             Test Item    Value        Reference Range Interpretation Comments

 

             Monocytes (test code = Monocytes) 9.7          2.0-12.0            

      



[facility]2018-11-08 10:50:01





             Test Item    Value        Reference Range Interpretation Comments

 

             eGFR (test code = eGFR) 133                                    



[facility]2018-11-08 10:50:01





             Test Item    Value        Reference Range Interpretation Comments

 

             Calcium Lvl (test code = Calcium Lvl) 9.4          8.5-10.5        

          



[facility]2018-11-08 10:50:01





             Test Item    Value        Reference Range Interpretation Comments

 

             CO2 (test code = CO2) 28           24-32                     



[facility]2018-11-08 10:50:01





             Test Item    Value        Reference Range Interpretation Comments

 

             BUN (test code = BUN) 14           7-22                      



[facility]2018-11-08 10:50:01





             Test Item    Value        Reference Range Interpretation Comments

 

             Glucose Lvl (test code = Glucose Lvl) 89           70-99           

          



[facility]2018-11-08 10:50:01





             Test Item    Value        Reference Range Interpretation Comments

 

             Chloride Lvl (test code = Chloride Lvl) 104                  

            



[facility]2018-11-08 10:50:01





             Test Item    Value        Reference Range Interpretation Comments

 

             Potassium Lvl (test code = Potassium 4.2          3.5-5.1          

         



             Lvl)                                                



[facility]2018-11-08 10:50:01





             Test Item    Value        Reference Range Interpretation Comments

 

             Sodium Lvl (test code = Sodium Lvl) 137          135-145           

        



[facility]2018-11-08 10:50:01





             Test Item    Value        Reference Range Interpretation Comments

 

             Creatinine Lvl (test code = Creatinine 0.85         0.50-1.40      

           



             Lvl)                                                



[facility]2018-11-08 10:50:01





             Test Item    Value        Reference Range Interpretation Comments

 

             AGAP (test code = AGAP) 9.2          10.0-20.0                 



[facility] StephenvilleDjoeticYSKXEMUYTT4741-99-79 10:50:01





             Test Item    Value        Reference Range Interpretation Comments

 

             ACT (TEG) Rapid (test code = ACT (TEG) 136 s                 

           



             Rapid)                                              



[facility] Stephen[id number] 10:50:01





             Test Item    Value        Reference Range Interpretation Comments

 

             Split Point Rapid (test code = Split 0.6 min                       

         



             Point Rapid)                                        



[facility] StephenvilleSdrtjqcLFGAHOFKML2639-26-89 10:50:01





             Test Item    Value        Reference Range Interpretation Comments

 

             R-time Rapid (test code = R-time 0.9 min      0.4-0.7              

     



             Rapid)                                              



[facility] StephenvilleHrzvrqsFOWPKKOOFA2874-97-70 10:50:01





             Test Item    Value        Reference Range Interpretation Comments

 

             K-time Rapid (test code = K-time 1.4 min      0.6-2.3              

     



             Rapid)                                              



[facility]villeNmojorcQLOLMRDXKN2947-82-21 10:50:01





             Test Item    Value        Reference Range Interpretation Comments

 

             Angle Rapid (test code = Angle 71 degrees   64-80                  

   



             Rapid)                                              



[facility]villeHdkhukgRNLQJLSZGO4302-18-78 10:50:01





             Test Item    Value        Reference Range Interpretation Comments

 

             G-value Rapid (test code = G-value 12.7         5.0-11.6           

       



             Rapid)                                              



[facility]villeUwrnggzXQDQZRZILX2030-02-37 10:50:01





             Test Item    Value        Reference Range Interpretation Comments

 

             Max Amplitude Rapid (test code = Max 72 mm        52-71            

         



             Amplitude Rapid)                                        



[facility]villeJarohrbBEEZEFGJNC4602-01-71 10:50:01





             Test Item    Value        Reference Range Interpretation Comments

 

             Estimated % Lysis Rapid 0.1          See_Comment                [Au

tomated message] The



             (test code = Estimated                                        syste

m which generated



             % Lysis Rapid)                                        this result t

ransmitted



                                                                 reference range

: <=7.5.



                                                                 The reference r

zhen was



                                                                 not used to int

erpret



                                                                 this result as



                                                                 normal/abnormal

.



[facility]villeHwpqpclLCHZPASJFE4835-74-50 10:50:01





             Test Item    Value        Reference Range Interpretation Comments

 

             Platelet (test code = Platelet) 367          133-450               

    



[facility]villeSjterlkVMOUBVAWYQ6084-74-27 10:50:01





             Test Item    Value        Reference Range Interpretation Comments

 

             MPV (test code = MPV) 7.8          7.4-10.4                  



[facility] StephenvilleJqbwkmxBTYHXIRFCU5743-30-48 10:50:01





             Test Item    Value        Reference Range Interpretation Comments

 

             MCH (test code = MCH) 27.4 pg      27.0-31.0                 



[facility] StephenvilleRjskoawLRUNUQTVRV9090-55-21 10:50:01





             Test Item    Value        Reference Range Interpretation Comments

 

             MCV (test code = MCV) 80.7         80.0-94.0                 



[facility] StephenvilleBublcrwYKQASMXFUK6295-60-33 10:50:01





             Test Item    Value        Reference Range Interpretation Comments

 

             MCHC (test code = MCHC) 34.0         32.0-36.0                 



[facility]villeHluioxyZKEESFNWVV7088-46-20 10:50:01





             Test Item    Value        Reference Range Interpretation Comments

 

             RDW (test code = RDW) 18.9         11.5-14.5                 



[facility] StephenvilleDmrxzbjOPVHOUIZVC3723-18-22 10:50:01





             Test Item    Value        Reference Range Interpretation Comments

 

             Hct (test code = Hct) 43.3         42.0-54.0                 



[facility]villeOxvvvefXVZZVIHVLV9366-67-34 10:50:01





             Test Item    Value        Reference Range Interpretation Comments

 

             WBC (test code = WBC) 9.3          3.7-10.4                  



[facility] StephenvilleIqbrbliTFHIKBPSJK1228-61-49 10:50:01





             Test Item    Value        Reference Range Interpretation Comments

 

             Hgb (test code = Hgb) 14.7         14.0-18.0                 



[facility]villeAoozluzWVIWNHSQKV1322-01-53 10:50:01





             Test Item    Value        Reference Range Interpretation Comments

 

             RBC (test code = RBC) 5.36         4.70-6.10                 



[facility] StephenvillePazjwidOHFXAFIIVO4103-62-99 10:50:01





             Test Item    Value        Reference Range Interpretation Comments

 

             Eosinophils # (test code 0.2          See_Comment                [A

utomated message] The



             = Eosinophils #)                                        system whic

h generated



                                                                 this result tra

nsmitted



                                                                 reference range

: <=0.5.



                                                                 The reference r

zhen was



                                                                 not used to int

erpret



                                                                 this result as



                                                                 normal/abnormal

.



[facility] StephenvilleWwdbgfhEXNDCWLDXL8109-11-67 10:50:01





             Test Item    Value        Reference Range Interpretation Comments

 

             Basophils # (test code 0.1          See_Comment                [Aut

omated message] The



             = Basophils #)                                        system which 

generated



                                                                 this result tra

nsmitted



                                                                 reference range

: <=0.2.



                                                                 The reference r

zhen was



                                                                 not used to int

erpret



                                                                 this result as



                                                                 normal/abnormal

.



[facility] StephenvilleGpjrgkmHZDIBQETMZ9070-36-81 10:50:01





             Test Item    Value        Reference Range Interpretation Comments

 

             Lymphocytes # (test code = Lymphocytes 1.8          1.0-5.5        

           



             #)                                                  



[facility] StephenvillePyncpemLUPDEIFWWU3231-38-66 10:50:01





             Test Item    Value        Reference Range Interpretation Comments

 

             Monocytes # (test code 0.9          See_Comment                [Aut

omated message] The



             = Monocytes #)                                        system which 

generated



                                                                 this result tra

nsmitted



                                                                 reference range

: <=0.8.



                                                                 The reference r

zhen was



                                                                 not used to int

erpret



                                                                 this result as



                                                                 normal/abnormal

.



[facility] StephenvilleAdhtmvwWEBKRPNUJT4101-24-65 10:50:01





             Test Item    Value        Reference Range Interpretation Comments

 

             Neutrophils # (test code = Neutrophils 6.3          1.5-8.1        

           



             #)                                                  



[facility] StephenvilleDbwgzyjWFGSSSFSCT9092-61-09 10:50:01





             Test Item    Value        Reference Range Interpretation Comments

 

             Eosinophils (test code = 2.0          See_Comment                [A

utomated message] The



             Eosinophils)                                        system which ge

nerated



                                                                 this result tra

nsmitted



                                                                 reference range

: <=4.0.



                                                                 The reference r

zhen was



                                                                 not used to int

erpret



                                                                 this result as



                                                                 normal/abnormal

.



[facility] StephenvilleXcljjmoAICVVYCVDX5973-88-98 10:50:01





             Test Item    Value        Reference Range Interpretation Comments

 

             Segs (test code = Segs) 67.4         45.0-75.0                 



[facility] StephenvilleRnymskrDNUFCGFTGQ8875-53-02 10:50:01





             Test Item    Value        Reference Range Interpretation Comments

 

             Lymphocytes (test code = Lymphocytes) 19.9         20.0-40.0       

          



[facility] StephenvilleKnolhbuTBXYUUFGHM1094-52-93 10:50:01





             Test Item    Value        Reference Range Interpretation Comments

 

             Basophils (test code = 1.0          See_Comment                [Aut

omated message] The



             Basophils)                                          system which ge

nerated



                                                                 this result tra

nsmitted



                                                                 reference range

: <=1.0.



                                                                 The reference r

zhen was



                                                                 not used to int

erpret



                                                                 this result as



                                                                 normal/abnormal

.



[facility] StephenvilleUbdrowvIXXMSEWBJH9898-91-44 10:50:01





             Test Item    Value        Reference Range Interpretation Comments

 

             Monocytes (test code = Monocytes) 9.7          2.0-12.0            

      



[facility]2018-11-08 10:50:01





             Test Item    Value        Reference Range Interpretation Comments

 

             eGFR (test code = eGFR) 133                                    



[facility]2018-11-08 10:50:01





             Test Item    Value        Reference Range Interpretation Comments

 

             Calcium Lvl (test code = Calcium Lvl) 9.4          8.5-10.5        

          



[facility]2018-11-08 10:50:01





             Test Item    Value        Reference Range Interpretation Comments

 

             CO2 (test code = CO2) 28           24-32                     



[facility]2018-11-08 10:50:01





             Test Item    Value        Reference Range Interpretation Comments

 

             BUN (test code = BUN) 14           7-22                      



[facility]2018-11-08 10:50:01





             Test Item    Value        Reference Range Interpretation Comments

 

             Glucose Lvl (test code = Glucose Lvl) 89           70-99           

          



[facility]2018-11-08 10:50:01





             Test Item    Value        Reference Range Interpretation Comments

 

             Chloride Lvl (test code = Chloride Lvl) 104                  

            



[facility]2018-11-08 10:50:01





             Test Item    Value        Reference Range Interpretation Comments

 

             Potassium Lvl (test code = Potassium 4.2          3.5-5.1          

         



             Lvl)                                                



[facility]2018-11-08 10:50:01





             Test Item    Value        Reference Range Interpretation Comments

 

             Sodium Lvl (test code = Sodium Lvl) 137          135-145           

        



[facility]2018-11-08 10:50:01





             Test Item    Value        Reference Range Interpretation Comments

 

             Creatinine Lvl (test code = Creatinine 0.85         0.50-1.40      

           



             Lvl)                                                



[facility]2018-11-08 10:50:01





             Test Item    Value        Reference Range Interpretation Comments

 

             AGAP (test code = AGAP) 9.2          10.0-20.0                 



[facility]villeJiprfitUZWVCLAEOP0205-37-83 10:50:01





             Test Item    Value        Reference Range Interpretation Comments

 

             ACT (TEG) Rapid (test code = ACT (TEG) 136 s                 

           



             Rapid)                                              



[facility]villeEddelzbHBQMVQQPXS0860-54-67 10:50:01





             Test Item    Value        Reference Range Interpretation Comments

 

             Split Point Rapid (test code = Split 0.6 min                       

         



             Point Rapid)                                        



[facility] StephenvilleLkeyuzyLEJLJDYGZY4687-53-80 10:50:01





             Test Item    Value        Reference Range Interpretation Comments

 

             R-time Rapid (test code = R-time 0.9 min      0.4-0.7              

     



             Rapid)                                              



[facility] StephenvilleFwgfrzeBMLYMTNNET7519-12-17 10:50:01





             Test Item    Value        Reference Range Interpretation Comments

 

             K-time Rapid (test code = K-time 1.4 min      0.6-2.3              

     



             Rapid)                                              



[facility]villeXvdzehwFLLXYTUBML1248-37-80 10:50:01





             Test Item    Value        Reference Range Interpretation Comments

 

             Angle Rapid (test code = Angle 71 degrees   64-80                  

   



             Rapid)                                              



[facility] StephenvillePlrkfujKPDVMBHYNL9604-13-46 10:50:01





             Test Item    Value        Reference Range Interpretation Comments

 

             G-value Rapid (test code = G-value 12.7         5.0-11.6           

       



             Rapid)                                              



[facility]villeWbhgediITHGCJYLRP6214-66-66 10:50:01





             Test Item    Value        Reference Range Interpretation Comments

 

             Max Amplitude Rapid (test code = Max 72 mm        52-71            

         



             Amplitude Rapid)                                        



[facility] StephenvilleNlektajGQLXGOEKVL3590-89-39 10:50:01





             Test Item    Value        Reference Range Interpretation Comments

 

             Estimated % Lysis Rapid 0.1          See_Comment                [Au

tomated message] The



             (test code = Estimated                                        syste

m which generated



             % Lysis Rapid)                                        this result t

ransmitted



                                                                 reference range

: <=7.5.



                                                                 The reference r

zhen was



                                                                 not used to int

erpret



                                                                 this result as



                                                                 normal/abnormal

.



[facility] StephenvilleUhccusfXNFNSQOJNV4097-08-02 10:50:01





             Test Item    Value        Reference Range Interpretation Comments

 

             Platelet (test code = Platelet) 367          133-450               

    



[facility]villeXeocfbtJZGIQTXSOZ9281-22-97 10:50:01





             Test Item    Value        Reference Range Interpretation Comments

 

             MPV (test code = MPV) 7.8          7.4-10.4                  



[facility] StephenvilleKbeqqmrYLXSSVCIMY2189-00-01 10:50:01





             Test Item    Value        Reference Range Interpretation Comments

 

             MCH (test code = MCH) 27.4 pg      27.0-31.0                 



[facility]villeSjbjtlvBCNIBNMCFF5941-22-00 10:50:01





             Test Item    Value        Reference Range Interpretation Comments

 

             MCV (test code = MCV) 80.7         80.0-94.0                 



[facility]villeHgxtshpCDVLQAHZXZ9908-67-53 10:50:01





             Test Item    Value        Reference Range Interpretation Comments

 

             MCHC (test code = MCHC) 34.0         32.0-36.0                 



[facility] StephenvilleZdqihzmDDYXFUPULF6611-43-07 10:50:01





             Test Item    Value        Reference Range Interpretation Comments

 

             RDW (test code = RDW) 18.9         11.5-14.5                 



[facility]villeEaczunrHNWQIGIQTJ5641-84-81 10:50:01





             Test Item    Value        Reference Range Interpretation Comments

 

             Hct (test code = Hct) 43.3         42.0-54.0                 



[facility]villeTkzsdyfCHVTLWHHNE0334-52-00 10:50:01





             Test Item    Value        Reference Range Interpretation Comments

 

             WBC (test code = WBC) 9.3          3.7-10.4                  



[facility] StephenvilleUoluuziWOQDAXFHIN2595-04-74 10:50:01





             Test Item    Value        Reference Range Interpretation Comments

 

             Hgb (test code = Hgb) 14.7         14.0-18.0                 



[facility] StephenvilleQmvbpoxLMFOLQAITF4138-99-90 10:50:01





             Test Item    Value        Reference Range Interpretation Comments

 

             RBC (test code = RBC) 5.36         4.70-6.10                 



[facility]villeEafmqqrRPHTMLIRTP8242-61-95 10:50:01





             Test Item    Value        Reference Range Interpretation Comments

 

             Eosinophils # (test code 0.2          See_Comment                [A

utomated message] The



             = Eosinophils #)                                        system whic

h generated



                                                                 this result tra

nsmitted



                                                                 reference range

: <=0.5.



                                                                 The reference r

zhen was



                                                                 not used to int

erpret



                                                                 this result as



                                                                 normal/abnormal

.



[facility] StephenvilleYnslbyrBXKFFBQXFJ2585-71-63 10:50:01





             Test Item    Value        Reference Range Interpretation Comments

 

             Basophils # (test code 0.1          See_Comment                [Aut

omated message] The



             = Basophils #)                                        system which 

generated



                                                                 this result tra

nsmitted



                                                                 reference range

: <=0.2.



                                                                 The reference r

zhen was



                                                                 not used to int

erpret



                                                                 this result as



                                                                 normal/abnormal

.



[facility] StephenvilleOgedwsiAHKQOPTAQH5938-46-81 10:50:01





             Test Item    Value        Reference Range Interpretation Comments

 

             Lymphocytes # (test code = Lymphocytes 1.8          1.0-5.5        

           



             #)                                                  



[facility] StephenvilleQmgsryvGLBQZUTBRC7507-74-30 10:50:01





             Test Item    Value        Reference Range Interpretation Comments

 

             Monocytes # (test code 0.9          See_Comment                [Aut

omated message] The



             = Monocytes #)                                        system which 

generated



                                                                 this result tra

nsmitted



                                                                 reference range

: <=0.8.



                                                                 The reference r

zhen was



                                                                 not used to int

erpret



                                                                 this result as



                                                                 normal/abnormal

.



[facility] StephenvilleAipypxyIXIYVQUXVL9950-45-03 10:50:01





             Test Item    Value        Reference Range Interpretation Comments

 

             Neutrophils # (test code = Neutrophils 6.3          1.5-8.1        

           



             #)                                                  



[facility] StephenvilleQwpycquIFGQJSYVXA1238-13-75 10:50:01





             Test Item    Value        Reference Range Interpretation Comments

 

             Eosinophils (test code = 2.0          See_Comment                [A

utomated message] The



             Eosinophils)                                        system which ge

nerated



                                                                 this result tra

nsmitted



                                                                 reference range

: <=4.0.



                                                                 The reference r

zhen was



                                                                 not used to int

erpret



                                                                 this result as



                                                                 normal/abnormal

.



[facility] StephenvilleVzlbyzjQDZCRADUNI3006-49-64 10:50:01





             Test Item    Value        Reference Range Interpretation Comments

 

             Segs (test code = Segs) 67.4         45.0-75.0                 



[facility] StephenvilleDvpcdsmTKYIYLLLCV3842-40-22 10:50:01





             Test Item    Value        Reference Range Interpretation Comments

 

             Lymphocytes (test code = Lymphocytes) 19.9         20.0-40.0       

          



[facility] StephenvilleXxigucpZBRWBICJRL3620-32-65 10:50:01





             Test Item    Value        Reference Range Interpretation Comments

 

             Basophils (test code = 1.0          See_Comment                [Aut

omated message] The



             Basophils)                                          system which ge

nerated



                                                                 this result tra

nsmitted



                                                                 reference range

: <=1.0.



                                                                 The reference r

zhen was



                                                                 not used to int

erpret



                                                                 this result as



                                                                 normal/abnormal

.



[facility]villeNuavcheQYAAOXUEAR9041-27-48 10:50:01





             Test Item    Value        Reference Range Interpretation Comments

 

             Monocytes (test code = Monocytes) 9.7          2.0-12.0            

      



[facility]2018-05-08 05:42:00





             Test Item    Value        Reference Range Interpretation Comments

 

             B/C Ratio (test code = B/C Ratio) 17 1         6-25                

      



[facility]2018-05-08 05:42:00





             Test Item    Value        Reference Range Interpretation Comments

 

             Globulin (test code = Globulin) 4.3          2.7-4.2               

    



[facility]2018-05-08 05:42:00





             Test Item    Value        Reference Range Interpretation Comments

 

             A/G Ratio (test code = A/G Ratio) 0.7 1        0.7-1.6             

      



[facility]2018-05-08 05:42:00





             Test Item    Value        Reference Range Interpretation Comments

 

             AGAP (test code = AGAP) 14.4         10.0-20.0                 



[facility]2018-05-08 05:42:00





             Test Item    Value        Reference Range Interpretation Comments

 

             eGFR (test code = eGFR) 113                                    



[facility]2018-05-08 05:42:00





             Test Item    Value        Reference Range Interpretation Comments

 

             Alk Phos (test code = Alk Phos) 76                           

    



[facility]2018-05-08 05:42:00





             Test Item    Value        Reference Range Interpretation Comments

 

             ALT (test code = ALT) 35           See_Comment                [Auto

mated message] The



                                                                 system which ge

nerated this



                                                                 result transmit

huma



                                                                 reference range

: <=65. The



                                                                 reference range

 was not



                                                                 used to interpr

et this



                                                                 result as zayda

l/abnormal.



[facility]2018-05-08 05:42:00





             Test Item    Value        Reference Range Interpretation Comments

 

             Albumin Lvl (test code = Albumin Lvl) 2.8          3.5-5.0         

          



[facility]2018-05-08 05:42:00





             Test Item    Value        Reference Range Interpretation Comments

 

             Total Protein (test code = Total 7.1          6.4-8.4              

     



             Protein)                                            



[facility]2018-05-08 05:42:00





             Test Item    Value        Reference Range Interpretation Comments

 

             Calcium Lvl (test code = Calcium Lvl) 8.7          8.5-10.5        

          



[facility]2018-05-08 05:42:00





             Test Item    Value        Reference Range Interpretation Comments

 

             AST (test code = AST) 18           See_Comment                [Auto

mated message] The



                                                                 system which ge

nerated this



                                                                 result transmit

huma



                                                                 reference range

: <=37. The



                                                                 reference range

 was not



                                                                 used to interpr

et this



                                                                 result as zayda

l/abnormal.



[facility]2018-05-08 05:42:00





             Test Item    Value        Reference Range Interpretation Comments

 

             Bili Total (test code = Bili Total) 0.3          0.2-1.3           

        



[facility]2018-05-08 05:42:00





             Test Item    Value        Reference Range Interpretation Comments

 

             Potassium Lvl (test code = Potassium 4.4          3.5-5.1          

         



             Lvl)                                                



[facility]2018-05-08 05:42:00





             Test Item    Value        Reference Range Interpretation Comments

 

             Chloride Lvl (test code = Chloride Lvl) 109                  

            



[facility]2018-05-08 05:42:00





             Test Item    Value        Reference Range Interpretation Comments

 

             CO2 (test code = CO2) 23           24-32                     



[facility]2018-05-08 05:42:00





             Test Item    Value        Reference Range Interpretation Comments

 

             Glucose Lvl (test code = Glucose Lvl) 114          70-99           

          



[facility]2018-05-08 05:42:00





             Test Item    Value        Reference Range Interpretation Comments

 

             Creatinine Lvl (test code = Creatinine 1.01         0.50-1.40      

           



             Lvl)                                                



[facility]2018-05-08 05:42:00





             Test Item    Value        Reference Range Interpretation Comments

 

             BUN (test code = BUN) 17           7-22                      



[facility]2018-05-08 05:42:00





             Test Item    Value        Reference Range Interpretation Comments

 

             Sodium Lvl (test code = Sodium Lvl) 142          135-145           

        



[facility]villeNyvpdnfWTFLDLBNEY5237-68-73 05:42:00





             Test Item    Value        Reference Range Interpretation Comments

 

             Basophils (test code = 0.6          See_Comment                [Aut

omated message] The



             Basophils)                                          system which ge

nerated



                                                                 this result tra

nsmitted



                                                                 reference range

: <=1.0.



                                                                 The reference r

zhen was



                                                                 not used to int

erpret



                                                                 this result as



                                                                 normal/abnormal

.



[facility] StephenvilleWuootyeFJBCCYASWO0279-85-03 05:42:00





             Test Item    Value        Reference Range Interpretation Comments

 

             Segs-Bands # (test code = Segs-Bands #) 4.8          1.5-8.1       

            



[place]698-05-08 05:42:00





             Test Item    Value        Reference Range Interpretation Comments

 

             Monocytes # (test code 0.7          See_Comment                [Aut

omated message] The



             = Monocytes #)                                        system which 

generated



                                                                 this result tra

nsmitted



                                                                 reference range

: <=0.8.



                                                                 The reference r

zhen was



                                                                 not used to int

erpret



                                                                 this result as



                                                                 normal/abnormal

.



[facility]villeVyvulbsNHAMSMLXKA7820-83-83 05:42:00





             Test Item    Value        Reference Range Interpretation Comments

 

             Lymphocytes # (test code = Lymphocytes 1.9          1.0-5.5        

           



             #)                                                  



[facility]villeBgvdgipECQYKGKIGG7995-74-54 05:42:00





             Test Item    Value        Reference Range Interpretation Comments

 

             Monocytes (test code = Monocytes) 9.4          2.0-12.0            

      



[facility]villeIwbuhtwQQWFYWYGAT9895-03-02 05:42:00





             Test Item    Value        Reference Range Interpretation Comments

 

             Eosinophils # (test code 0.2          See_Comment                [A

utomated message] The



             = Eosinophils #)                                        system whic

h generated



                                                                 this result tra

nsmitted



                                                                 reference range

: <=0.5.



                                                                 The reference r

zhen was



                                                                 not used to int

erpret



                                                                 this result as



                                                                 normal/abnormal

.



[facility] StephenvilleOyrnsqfQDZATYNWYT1539-47-85 05:42:00





             Test Item    Value        Reference Range Interpretation Comments

 

             Eosinophils (test code = 2.9          See_Comment                [A

utomated message] The



             Eosinophils)                                        system which ge

nerated



                                                                 this result tra

nsmitted



                                                                 reference range

: <=4.0.



                                                                 The reference r

zhen was



                                                                 not used to int

erpret



                                                                 this result as



                                                                 normal/abnormal

.



[facility] StephenvilleEfahpwpDAOWOOHIXH2269-73-42 05:42:00





             Test Item    Value        Reference Range Interpretation Comments

 

             Segs (test code = Segs) 62.2         45.0-75.0                 



[facility] StephenvilleDmlegeqAQTVEQYXKB6741-43-15 05:42:00





             Test Item    Value        Reference Range Interpretation Comments

 

             Lymphocytes (test code = Lymphocytes) 24.9         20.0-40.0       

          



[facility] StephenvilleKyyooibQSWXEPZFRY9430-69-39 05:42:00





             Test Item    Value        Reference Range Interpretation Comments

 

             MCH (test code = MCH) 27.5 pg      27.0-31.0                 



[facility] StephenvilleCaqjtdoIWTAJYKXCU0137-66-75 05:42:00





             Test Item    Value        Reference Range Interpretation Comments

 

             MCV (test code = MCV) 85.3         80.0-94.0                 



[facility] StephenvilleOlkknihSQUEUPLGIM9799-05-72 05:42:00





             Test Item    Value        Reference Range Interpretation Comments

 

             Hct (test code = Hct) 43.9         42.0-54.0                 



[facility] StephenvilleFcbkecjHVNTHVTPSS2776-17-69 05:42:00





             Test Item    Value        Reference Range Interpretation Comments

 

             Hgb (test code = Hgb) 14.2         14.0-18.0                 



[facility] StephenvilleZncnzhlXGMPGCZZIZ9460-22-42 05:42:00





             Test Item    Value        Reference Range Interpretation Comments

 

             WBC (test code = WBC) 7.7          3.7-10.4                  



[facility] StephenvilleBkctnhfRZBOWHWCYF3770-62-05 05:42:00





             Test Item    Value        Reference Range Interpretation Comments

 

             RBC (test code = RBC) 5.15         4.70-6.10                 



[facility] StephenvilleMtodxysYESGRCEYTD7516-89-01 05:42:00





             Test Item    Value        Reference Range Interpretation Comments

 

             MPV (test code = MPV) 8.4          7.4-10.4                  



[facility] StephenvilleVggapluXHRGLBUQSJ5802-94-22 05:42:00





             Test Item    Value        Reference Range Interpretation Comments

 

             MCHC (test code = MCHC) 32.3         32.0-36.0                 



[facility] StephenvilleUvfvjeqWAHMOXKDTW6431-70-68 05:42:00





             Test Item    Value        Reference Range Interpretation Comments

 

             RDW (test code = RDW) 17.3         11.5-14.5                 



[facility] StephenvilleNxrvbyuLJZZUORBOG5970-00-76 05:42:00





             Test Item    Value        Reference Range Interpretation Comments

 

             Platelet (test code = Platelet) 317          133-450               

    



[facility]2018-05-08 05:42:00





             Test Item    Value        Reference Range Interpretation Comments

 

             B/C Ratio (test code = B/C Ratio) 17 1         6-25                

      



[facility]2018-05-08 05:42:00





             Test Item    Value        Reference Range Interpretation Comments

 

             Globulin (test code = Globulin) 4.3          2.7-4.2               

    



[facility]2018-05-08 05:42:00





             Test Item    Value        Reference Range Interpretation Comments

 

             A/G Ratio (test code = A/G Ratio) 0.7 1        0.7-1.6             

      



[facility]2018-05-08 05:42:00





             Test Item    Value        Reference Range Interpretation Comments

 

             AGAP (test code = AGAP) 14.4         10.0-20.0                 



[facility]2018-05-08 05:42:00





             Test Item    Value        Reference Range Interpretation Comments

 

             eGFR (test code = eGFR) 113                                    



[facility]2018-05-08 05:42:00





             Test Item    Value        Reference Range Interpretation Comments

 

             Alk Phos (test code = Alk Phos) 76                           

    



[facility]2018-05-08 05:42:00





             Test Item    Value        Reference Range Interpretation Comments

 

             ALT (test code = ALT) 35           See_Comment                [Auto

mated message] The



                                                                 system which ge

nerated this



                                                                 result transmit

huma



                                                                 reference range

: <=65. The



                                                                 reference range

 was not



                                                                 used to interpr

et this



                                                                 result as zayda

l/abnormal.



[facility]2018-05-08 05:42:00





             Test Item    Value        Reference Range Interpretation Comments

 

             Albumin Lvl (test code = Albumin Lvl) 2.8          3.5-5.0         

          



[facility]2018-05-08 05:42:00





             Test Item    Value        Reference Range Interpretation Comments

 

             Total Protein (test code = Total 7.1          6.4-8.4              

     



             Protein)                                            



[facility]2018-05-08 05:42:00





             Test Item    Value        Reference Range Interpretation Comments

 

             Calcium Lvl (test code = Calcium Lvl) 8.7          8.5-10.5        

          



[facility]2018-05-08 05:42:00





             Test Item    Value        Reference Range Interpretation Comments

 

             AST (test code = AST) 18           See_Comment                [Auto

mated message] The



                                                                 system which ge

nerated this



                                                                 result transmit

huma



                                                                 reference range

: <=37. The



                                                                 reference range

 was not



                                                                 used to interpr

et this



                                                                 result as zayda

l/abnormal.



[facility]2018-05-08 05:42:00





             Test Item    Value        Reference Range Interpretation Comments

 

             Bili Total (test code = Bili Total) 0.3          0.2-1.3           

        



[facility]2018-05-08 05:42:00





             Test Item    Value        Reference Range Interpretation Comments

 

             Potassium Lvl (test code = Potassium 4.4          3.5-5.1          

         



             Lvl)                                                



[facility]2018-05-08 05:42:00





             Test Item    Value        Reference Range Interpretation Comments

 

             Chloride Lvl (test code = Chloride Lvl) 109                  

            



[facility]2018-05-08 05:42:00





             Test Item    Value        Reference Range Interpretation Comments

 

             CO2 (test code = CO2) 23           24-32                     



[facility]2018-05-08 05:42:00





             Test Item    Value        Reference Range Interpretation Comments

 

             Glucose Lvl (test code = Glucose Lvl) 114          70-99           

          



[facility]2018-05-08 05:42:00





             Test Item    Value        Reference Range Interpretation Comments

 

             Creatinine Lvl (test code = Creatinine 1.01         0.50-1.40      

           



             Lvl)                                                



[facility]2018-05-08 05:42:00





             Test Item    Value        Reference Range Interpretation Comments

 

             BUN (test code = BUN) 17           7-22                      



[facility]2018-05-08 05:42:00





             Test Item    Value        Reference Range Interpretation Comments

 

             Sodium Lvl (test code = Sodium Lvl) 142          135-145           

        



[facility] StephenvilleWdozpueGGDXWNJJUC6033-57-50 05:42:00





             Test Item    Value        Reference Range Interpretation Comments

 

             Basophils (test code = 0.6          See_Comment                [Aut

omated message] The



             Basophils)                                          system which ge

nerated



                                                                 this result tra

nsmitted



                                                                 reference range

: <=1.0.



                                                                 The reference r

zhen was



                                                                 not used to int

erpret



                                                                 this result as



                                                                 normal/abnormal

.



[facility] StephenvilleXacvdsmOVNZEACCGF6925-57-31 05:42:00





             Test Item    Value        Reference Range Interpretation Comments

 

             Segs-Bands # (test code = Segs-Bands #) 4.8          1.5-8.1       

            



[facility]villeVlzibdjVZITIIKUSQ1198-64-62 05:42:00





             Test Item    Value        Reference Range Interpretation Comments

 

             Monocytes # (test code 0.7          See_Comment                [Aut

omated message] The



             = Monocytes #)                                        system which 

generated



                                                                 this result tra

nsmitted



                                                                 reference range

: <=0.8.



                                                                 The reference r

zhen was



                                                                 not used to int

erpret



                                                                 this result as



                                                                 normal/abnormal

.



[facility] StephenvilleUzjadmgKCNWMVZXCQ1549-11-34 05:42:00





             Test Item    Value        Reference Range Interpretation Comments

 

             Lymphocytes # (test code = Lymphocytes 1.9          1.0-5.5        

           



             #)                                                  



[facility] StephenvilleVmcchbsAZFMCXJAZG5432-66-07 05:42:00





             Test Item    Value        Reference Range Interpretation Comments

 

             Monocytes (test code = Monocytes) 9.4          2.0-12.0            

      



[facility]villePsiuyrgLRTALZMWLY9043-49-49 05:42:00





             Test Item    Value        Reference Range Interpretation Comments

 

             Eosinophils # (test code 0.2          See_Comment                [A

utomated message] The



             = Eosinophils #)                                        system whic

h generated



                                                                 this result tra

nsmitted



                                                                 reference range

: <=0.5.



                                                                 The reference r

zhen was



                                                                 not used to int

erpret



                                                                 this result as



                                                                 normal/abnormal

.



[facility] StephenvilleOkslmabNJOHJNYMUU8788-15-16 05:42:00





             Test Item    Value        Reference Range Interpretation Comments

 

             Eosinophils (test code = 2.9          See_Comment                [A

utomated message] The



             Eosinophils)                                        system which ge

nerated



                                                                 this result tra

nsmitted



                                                                 reference range

: <=4.0.



                                                                 The reference r

zhen was



                                                                 not used to int

erpret



                                                                 this result as



                                                                 normal/abnormal

.



[facility] StephenvilleXxylpicQVYEFNRTQG3666-82-41 05:42:00





             Test Item    Value        Reference Range Interpretation Comments

 

             Segs (test code = Segs) 62.2         45.0-75.0                 



[facility] StephenvilleOfxckqwUUQPDNMGQI1586-55-33 05:42:00





             Test Item    Value        Reference Range Interpretation Comments

 

             Lymphocytes (test code = Lymphocytes) 24.9         20.0-40.0       

          



[facility] StephenvilleFwpvcpjPTZTHDFSHM8193-61-62 05:42:00





             Test Item    Value        Reference Range Interpretation Comments

 

             MCH (test code = MCH) 27.5 pg      27.0-31.0                 



[facility] [facility]MQ[facility]00





             Test Item    Value        Reference Range Interpretation Comments

 

             MCV (test code = MCV) 85.3         80.0-94.0                 



[facility]CxlhvnfDOQBSZAFMG3108-07-20 05:42:00





             Test Item    Value        Reference Range Interpretation Comments

 

             Hct (test code = Hct) 43.9         42.0-54.0                 



[facility]villeQhagsjtQALYKMHGJI0329-56-94 05:42:00





             Test Item    Value        Reference Range Interpretation Comments

 

             Hgb (test code = Hgb) 14.2         14.0-18.0                 



[facility]villeZeogbwxVLAVYRINFU8951-70-75 05:42:00





             Test Item    Value        Reference Range Interpretation Comments

 

             WBC (test code = WBC) 7.7          3.7-10.4                  



[facility]villeVusugzcFHHMGJJGMX3837-77-79 05:42:00





             Test Item    Value        Reference Range Interpretation Comments

 

             RBC (test code = RBC) 5.15         4.70-6.10                 



[facility]villeDesclutBXFMWZQBGH8874-97-36 05:42:00





             Test Item    Value        Reference Range Interpretation Comments

 

             MPV (test code = MPV) 8.4          7.4-10.4                  



[facility]villeAgsxdfvAGFGHCUOLV6491-83-74 05:42:00





             Test Item    Value        Reference Range Interpretation Comments

 

             MCHC (test code = MCHC) 32.3         32.0-36.0                 



[facility]villeCyzohxiNFRLCNXTGY4209-53-38 05:42:00





             Test Item    Value        Reference Range Interpretation Comments

 

             RDW (test code = RDW) 17.3         11.5-14.5                 



[facility]Xspl[facility]adQBGS[facility]AVM6400[facility]28 05:42:00





             Test Item    Value        Reference Range Interpretation Comments

 

             Platelet (test code = Platelet) 317          133-450               

    



[facility]2018-05-08 05:42:00





             Test Item    Value        Reference Range Interpretation Comments

 

             B/C Ratio (test code = B/C Ratio) 17 1         6-25                

      



[facility]2018-05-08 05:42:00





             Test Item    Value        Reference Range Interpretation Comments

 

             Globulin (test code = Globulin) 4.3          2.7-4.2               

    



[facility]2018-05-08 05:42:00





             Test Item    Value        Reference Range Interpretation Comments

 

             A/G Ratio (test code = A/G Ratio) 0.7 1        0.7-1.6             

      



[facility]2018-05-08 05:42:00





             Test Item    Value        Reference Range Interpretation Comments

 

             AGAP (test code = AGAP) 14.4         10.0-20.0                 



[facility]2018-05-08 05:42:00





             Test Item    Value        Reference Range Interpretation Comments

 

             eGFR (test code = eGFR) 113                                    



[facility]2018-05-08 05:42:00





             Test Item    Value        Reference Range Interpretation Comments

 

             Alk Phos (test code = Alk Phos) 76                           

    



[facility]2018-05-08 05:42:00





             Test Item    Value        Reference Range Interpretation Comments

 

             ALT (test code = ALT) 35           See_Comment                [Auto

mated message] The



                                                                 system which ge

nerated this



                                                                 result transmit

huma



                                                                 reference range

: <=65. The



                                                                 reference range

 was not



                                                                 used to interpr

et this



                                                                 result as zayda

l/abnormal.



[facility]2018-05-08 05:42:00





             Test Item    Value        Reference Range Interpretation Comments

 

             Albumin Lvl (test code = Albumin Lvl) 2.8          3.5-5.0         

          



[facility]2018-05-08 05:42:00





             Test Item    Value        Reference Range Interpretation Comments

 

             Total Protein (test code = Total 7.1          6.4-8.4              

     



             Protein)                                            



[facility]2018-05-08 05:42:00





             Test Item    Value        Reference Range Interpretation Comments

 

             Calcium Lvl (test code = Calcium Lvl) 8.7          8.5-10.5        

          



[facility]2018-05-08 05:42:00





             Test Item    Value        Reference Range Interpretation Comments

 

             AST (test code = AST) 18           See_Comment                [Auto

mated message] The



                                                                 system which ge

nerated this



                                                                 result transmit

huma



                                                                 reference range

: <=37. The



                                                                 reference range

 was not



                                                                 used to interpr

et this



                                                                 result as zayda

l/abnormal.



[facility]2018-05-08 05:42:00





             Test Item    Value        Reference Range Interpretation Comments

 

             Bili Total (test code = Bili Total) 0.3          0.2-1.3           

        



[facility]2018-05-08 05:42:00





             Test Item    Value        Reference Range Interpretation Comments

 

             Potassium Lvl (test code = Potassium 4.4          3.5-5.1          

         



             Lvl)                                                



[facility]2018-05-08 05:42:00





             Test Item    Value        Reference Range Interpretation Comments

 

             Chloride Lvl (test code = Chloride Lvl) 109                  

            



[facility]2018-05-08 05:42:00





             Test Item    Value        Reference Range Interpretation Comments

 

             CO2 (test code = CO2) 23           24-32                     



[facility]2018-05-08 05:42:00





             Test Item    Value        Reference Range Interpretation Comments

 

             Glucose Lvl (test code = Glucose Lvl) 114          70-99           

          



[facility]2018-05-08 05:42:00





             Test Item    Value        Reference Range Interpretation Comments

 

             Creatinine Lvl (test code = Creatinine 1.01         0.50-1.40      

           



             Lvl)                                                



[facility]2018-05-08 05:42:00





             Test Item    Value        Reference Range Interpretation Comments

 

             BUN (test code = BUN) 17           7-22                      



[facility]2018-05-08 05:42:00





             Test Item    Value        Reference Range Interpretation Comments

 

             Sodium Lvl (test code = Sodium Lvl) 142          135-145           

        



[facility]villeBgiscjgCOGGZLVHJX8570-46-27 05:42:00





             Test Item    Value        Reference Range Interpretation Comments

 

             Basophils (test code = 0.6          See_Comment                [Aut

omated message] The



             Basophils)                                          system which ge

nerated



                                                                 this result tra

nsmitted



                                                                 reference range

: <=1.0.



                                                                 The reference r

zhen was



                                                                 not used to int

erpret



                                                                 this result as



                                                                 normal/abnormal

.



[facility]villeHyprptsZTROQXOFEZ5712-53-91 05:42:00





             Test Item    Value        Reference Range Interpretation Comments

 

             Segs-Bands # (test code = Segs-Bands #) 4.8          1.5-8.1       

            



[facility]villeWrllqrgUUODJDCZJE8891-91-40 05:42:00





             Test Item    Value        Reference Range Interpretation Comments

 

             Monocytes # (test code 0.7          See_Comment                [Aut

omated message] The



             = Monocytes #)                                        system which 

generated



                                                                 this result tra

nsmitted



                                                                 reference range

: <=0.8.



                                                                 The reference r

zhen was



                                                                 not used to int

erpret



                                                                 this result as



                                                                 normal/abnormal

.



[facility]villeIhgqfppOJTCZESQOE4983-02-10 05:42:00





             Test Item    Value        Reference Range Interpretation Comments

 

             Lymphocytes # (test code = Lymphocytes 1.9          1.0-5.5        

           



             #)                                                  



[facility]villeMzlwjjzLDRVPVOHSS4339-40-15 05:42:00





             Test Item    Value        Reference Range Interpretation Comments

 

             Monocytes (test code = Monocytes) 9.4          2.0-12.0            

      



[facility]villeLsmfohvFZOTHARPZP1558-53-60 05:42:00





             Test Item    Value        Reference Range Interpretation Comments

 

             Eosinophils # (test code 0.2          See_Comment                [A

utomated message] The



             = Eosinophils #)                                        system whic

h generated



                                                                 this result tra

nsmitted



                                                                 reference range

: <=0.5.



                                                                 The reference r

zhen was



                                                                 not used to int

erpret



                                                                 this result as



                                                                 normal/abnormal

.



[facility]villeRfokuapIVSDIWLGFZ0114-84-38 05:42:00





             Test Item    Value        Reference Range Interpretation Comments

 

             Eosinophils (test code = 2.9          See_Comment                [A

utomated message] The



             Eosinophils)                                        system which ge

nerated



                                                                 this result tra

nsmitted



                                                                 reference range

: <=4.0.



                                                                 The reference r

zhen was



                                                                 not used to int

erpret



                                                                 this result as



                                                                 normal/abnormal

.



[facility]villeZyvmxezNJWQHESCBA5625-01-79 05:42:00





             Test Item    Value        Reference Range Interpretation Comments

 

             Segs (test code = Segs) 62.2         45.0-75.0                 



[facility]villeTwbfqgdUOTVEXKJFD1624-45-36 05:42:00





             Test Item    Value        Reference Range Interpretation Comments

 

             Lymphocytes (test code = Lymphocytes) 24.9         20.0-40.0       

          



[facility]villeWsfvlveQFDCHQPGNR3759-42-82 05:42:00





             Test Item    Value        Reference Range Interpretation Comments

 

             MCH (test code = MCH) 27.5 pg      27.0-31.0                 



[facility]villeJjzqwrvMNJZHIYRKG5259-77-94 05:42:00





             Test Item    Value        Reference Range Interpretation Comments

 

             MCV (test code = MCV) 85.3         80.0-94.0                 



[facility]villeEsbllebQMOBNVRFCC8506-30-69 05:42:00





             Test Item    Value        Reference Range Interpretation Comments

 

             Hct (test code = Hct) 43.9         42.0-54.0                 



[facility]villeHpvnmswBTYUANNCIP2483-22-00 05:42:00





             Test Item    Value        Reference Range Interpretation Comments

 

             Hgb (test code = Hgb) 14.2         14.0-18.0                 



[facility]villeHamjgjdUIINZIOIFI8168-90-40 05:42:00





             Test Item    Value        Reference Range Interpretation Comments

 

             WBC (test code = WBC) 7.7          3.7-10.4                  



[facility]villeDlcorvyDTQYQFTGRI3432-61-50 05:42:00





             Test Item    Value        Reference Range Interpretation Comments

 

             RBC (test code = RBC) 5.15         4.70-6.10                 



[facility]villeYebjdpsQZYESIRDOT5404-04-79 05:42:00





             Test Item    Value        Reference Range Interpretation Comments

 

             MPV (test code = MPV) 8.4          7.4-10.4                  



[facility]villeDztgtqvFSSRLQXNTV2320-85-57 05:42:00





             Test Item    Value        Reference Range Interpretation Comments

 

             MCHC (test code = MCHC) 32.3         32.0-36.0                 



[facility]villeTxrqhhjQGNWVLBXMP6128-57-48 05:42:00





             Test Item    Value        Reference Range Interpretation Comments

 

             RDW (test code = RDW) 17.3         11.5-14.5                 



[facility]villeVcmkaveADYHOCTYGX5408-30-00 05:42:00





             Test Item    Value        Reference Range Interpretation Comments

 

             Platelet (test code = Platelet) 317          133-450               

    



[facility]villeFfvfvxrILFZXXPOKB5496-05-57 09:36:00





             Test Item    Value        Reference Range Interpretation Comments

 

             MCV (test code = MCV) 86.1         80.0-94.0                 



[facility]villeHlkfeekQVAUJYIIWT7231-69-78 09:36:00





             Test Item    Value        Reference Range Interpretation Comments

 

             Hct (test code = Hct) 44.5         42.0-54.0                 



[facility]villeEcscuehTTPSVEWSYI8948-24-07 09:36:00





             Test Item    Value        Reference Range Interpretation Comments

 

             MCH (test code = MCH) 28.0 pg      27.0-31.0                 



[facility]villeLrvifzfSZFTFMWKHT0720-21-30 09:36:00





             Test Item    Value        Reference Range Interpretation Comments

 

             Hgb (test code = Hgb) 14.5         14.0-18.0                 



[facility]villeTjlpdokHWNYTXLWSD7375-32-22 09:36:00





             Test Item    Value        Reference Range Interpretation Comments

 

             Lymphocytes # (test code = Lymphocytes 1.7          1.0-5.5        

           



             #)                                                  



[facility]villeEbjwjvfABNMLVFTBC9992-99-56 09:36:00





             Test Item    Value        Reference Range Interpretation Comments

 

             Monocytes # (test code 0.7          See_Comment                [Aut

omated message] The



             = Monocytes #)                                        system which 

generated



                                                                 this result tra

nsmitted



                                                                 reference range

: <=0.8.



                                                                 The reference r

zhen was



                                                                 not used to int

erpret



                                                                 this result as



                                                                 normal/abnormal

.



[facility]villeHunkihzXGCKULNBMP7147-44-92 09:36:00





             Test Item    Value        Reference Range Interpretation Comments

 

             Eosinophils # (test code 0.2          See_Comment                [A

utomated message] The



             = Eosinophils #)                                        system whic

h generated



                                                                 this result tra

nsmitted



                                                                 reference range

: <=0.5.



                                                                 The reference r

zhen was



                                                                 not used to int

erpret



                                                                 this result as



                                                                 normal/abnormal

.



[facility]villeVlhiyvmNVYOUWFBYR0508-08-66 09:36:00





             Test Item    Value        Reference Range Interpretation Comments

 

             Lymphocytes (test code = Lymphocytes) 26.1         20.0-40.0       

          



[facility]villeQmiwyeeDOAMLNREVD6722-14-93 09:36:00





             Test Item    Value        Reference Range Interpretation Comments

 

             Segs (test code = Segs) 59.3         45.0-75.0                 



[facility]villeEtlyjvvYSLOSNBTHO2156-84-98 09:36:00





             Test Item    Value        Reference Range Interpretation Comments

 

             Basophils (test code = 0.8          See_Comment                [Aut

omated message] The



             Basophils)                                          system which ge

nerated



                                                                 this result tra

nsmitted



                                                                 reference range

: <=1.0.



                                                                 The reference r

zhen was



                                                                 not used to int

erpret



                                                                 this result as



                                                                 normal/abnormal

.



[facility]villeDchbichNUNLGOZLZN2983-92-91 09:36:00





             Test Item    Value        Reference Range Interpretation Comments

 

             Monocytes (test code = Monocytes) 10.5         2.0-12.0            

      



[facility]villeRjdxkhhFLJLABKXTZ5770-67-31 09:36:00





             Test Item    Value        Reference Range Interpretation Comments

 

             Eosinophils (test code = 3.3          See_Comment                [A

utomated message] The



             Eosinophils)                                        system which ge

nerated



                                                                 this result tra

nsmitted



                                                                 reference range

: <=4.0.



                                                                 The reference r

zhen was



                                                                 not used to int

erpret



                                                                 this result as



                                                                 normal/abnormal

.



[facility]villeQhvxyaiKKBPPSFMAC7109-92-84 09:36:00





             Test Item    Value        Reference Range Interpretation Comments

 

             Segs-Bands # (test code = Segs-Bands #) 3.9          1.5-8.1       

            



[facility]2018-05-07 09:36:00





             Test Item    Value        Reference Range Interpretation Comments

 

             Globulin (test code = Globulin) 4.4          2.7-4.2               

    



[facility]2018-05-07 09:36:00





             Test Item    Value        Reference Range Interpretation Comments

 

             A/G Ratio (test code = A/G Ratio) 0.6 1        0.7-1.6             

      



[place] 658808-05-07 09:36:00





             Test Item    Value        Reference Range Interpretation Comments

 

             B/C Ratio (test code = B/C Ratio) 17 1         6-25                

      



[place] 658808-05-07 09:36:00





             Test Item    Value        Reference Range Interpretation Comments

 

             AGAP (test code = AGAP) 11.3         10.0-20.0                 



[place] 658808-05-07 09:36:00





             Test Item    Value        Reference Range Interpretation Comments

 

             eGFR (test code = eGFR) 134                                    



[facility]2018-05-07 09:36:00





             Test Item    Value        Reference Range Interpretation Comments

 

             Creatinine Lvl (test code = Creatinine 0.84         0.50-1.40      

           



             Lvl)                                                



[facility]2018-05-07 09:36:00





             Test Item    Value        Reference Range Interpretation Comments

 

             Sodium Lvl (test code = Sodium Lvl) 142          135-145           

        



[facility]2018-05-07 09:36:00





             Test Item    Value        Reference Range Interpretation Comments

 

             Glucose Lvl (test code = Glucose Lvl) 99           70-99           

          



[facility]2018-05-07 09:36:00





             Test Item    Value        Reference Range Interpretation Comments

 

             BUN (test code = BUN) 14           7-22                      



[place] [id number] 09:36:00





             Test Item    Value        Reference Range Interpretation Comments

 

             Alk Phos (test code = Alk Phos) 79                           

    



[facility]2018-05-07 09:36:00





             Test Item    Value        Reference Range Interpretation Comments

 

             Bili Total (test code = Bili Total) 0.3          0.2-1.3           

        



[facility]2018-05-07 09:36:00





             Test Item    Value        Reference Range Interpretation Comments

 

             AST (test code = AST) 14           See_Comment                [Auto

mated message] The



                                                                 system which ge

nerated this



                                                                 result transmit

huma



                                                                 reference range

: <=37. The



                                                                 reference range

 was not



                                                                 used to interpr

et this



                                                                 result as zayda

l/abnormal.



[facility]2018-05-07 09:36:00





             Test Item    Value        Reference Range Interpretation Comments

 

             ALT (test code = ALT) 43           See_Comment                [Auto

mated message] The



                                                                 system which ge

nerated this



                                                                 result transmit

huma



                                                                 reference range

: <=65. The



                                                                 reference range

 was not



                                                                 used to interpr

et this



                                                                 result as zayda

l/abnormal.



[place]58808-05-07 09:36:00





             Test Item    Value        Reference Range Interpretation Comments

 

             Total Protein (test code = Total 7.1          6.4-8.4              

     



             Protein)                                            



[place] 658808-05-07 09:36:00





             Test Item    Value        Reference Range Interpretation Comments

 

             Albumin Lvl (test code = Albumin Lvl) 2.7          3.5-5.0         

          



[place] 658808-05-07 09:36:00





             Test Item    Value        Reference Range Interpretation Comments

 

             Calcium Lvl (test code = Calcium Lvl) 9.2          8.5-10.5        

          



[place] 658808-05-07 09:36:00





             Test Item    Value        Reference Range Interpretation Comments

 

             CO2 (test code = CO2) 21           24-32                     



[place] 658808-05-07 09:36:00





             Test Item    Value        Reference Range Interpretation Comments

 

             Potassium Lvl (test code = Potassium 4.3          3.5-5.1          

         



             Lvl)                                                



[place] 658808-05-07 09:36:00





             Test Item    Value        Reference Range Interpretation Comments

 

             Chloride Lvl (test code = Chloride Lvl) 114                  

            



[facility]BtlqxcyKIWFYIRJOV3881[facility]36:00





             Test Item    Value        Reference Range Interpretation Comments

 

             MCHC (test code = MCHC) 32.5         32.0-36.0                 



[facility]villeCbzduknLKSKJOSIXQ1699-39-26 09:36:00





             Test Item    Value        Reference Range Interpretation Comments

 

             RDW (test code = RDW) 17.5         11.5-14.5                 



[facility]villeNzrwkrdKRPAFXVFME4045-65-88 09:36:00





             Test Item    Value        Reference Range Interpretation Comments

 

             Platelet (test code = Platelet) 400          133-450               

    



[facility]villeDprrlnxSZGNCIJGRE1627-32-02 09:36:00





             Test Item    Value        Reference Range Interpretation Comments

 

             MPV (test code = MPV) 8.5          7.4-10.4                  



[facility]villeTtthkhgBMFBMLUDXL5368-24-79 09:36:00





             Test Item    Value        Reference Range Interpretation Comments

 

             WBC (test code = WBC) 6.6          3.7-10.4                  



[facility]villeItkschkOKMPLJMWDW4213-47-11 09:36:00





             Test Item    Value        Reference Range Interpretation Comments

 

             RBC (test code = RBC) 5.17         4.70-6.10                 



[facility]villeNjmmzzyOYRAQRXPQS8301-64-73 09:36:00





             Test Item    Value        Reference Range Interpretation Comments

 

             MCV (test code = MCV) 86.1         80.0-94.0                 



[facility]villeMtnnoonXVBAWTCEXD7156-54-91 09:36:00





             Test Item    Value        Reference Range Interpretation Comments

 

             Hct (test code = Hct) 44.5         42.0-54.0                 



[facility]villeBilkqtnYQIGEKNSOZ2051-84-06 09:36:00





             Test Item    Value        Reference Range Interpretation Comments

 

             MCH (test code = MCH) 28.0 pg      27.0-31.0                 



[facility] StephenvilleFbpuyylAWEOLJNDSA0747-79-54 09:36:00





             Test Item    Value        Reference Range Interpretation Comments

 

             Hgb (test code = Hgb) 14.5         14.0-18.0                 



[facility] StephenvilleGgekbofMSZEFGAZUT7744-51-51 09:36:00





             Test Item    Value        Reference Range Interpretation Comments

 

             Lymphocytes # (test code = Lymphocytes 1.7          1.0-5.5        

           



             #)                                                  



[facility] StephenvilleSbwawvuAGCNQNZXRJ7659-53-83 09:36:00





             Test Item    Value        Reference Range Interpretation Comments

 

             Monocytes # (test code 0.7          See_Comment                [Aut

omated message] The



             = Monocytes #)                                        system which 

generated



                                                                 this result tra

nsmitted



                                                                 reference range

: <=0.8.



                                                                 The reference r

zhen was



                                                                 not used to int

erpret



                                                                 this result as



                                                                 normal/abnormal

.



[facility] StephenvilleMqkeukiTVJLJCJVZL7209-68-91 09:36:00





             Test Item    Value        Reference Range Interpretation Comments

 

             Eosinophils # (test code 0.2          See_Comment                [A

utomated message] The



             = Eosinophils #)                                        system whic

h generated



                                                                 this result tra

nsmitted



                                                                 reference range

: <=0.5.



                                                                 The reference r

zhen was



                                                                 not used to int

erpret



                                                                 this result as



                                                                 normal/abnormal

.



[facility] StephenvilleFqgwokvIIVGKVHKNQ7838-22-36 09:36:00





             Test Item    Value        Reference Range Interpretation Comments

 

             Lymphocytes (test code = Lymphocytes) 26.1         20.0-40.0       

          



[facility]villeOssjmzwGUFWFEBXVT4285-37-16 09:36:00





             Test Item    Value        Reference Range Interpretation Comments

 

             Segs (test code = Segs) 59.3         45.0-75.0                 



[facility] StephenvilleWxwstndINFDRAOBOP5184-13-66 09:36:00





             Test Item    Value        Reference Range Interpretation Comments

 

             Basophils (test code = 0.8          See_Comment                [Aut

omated message] The



             Basophils)                                          system which ge

nerated



                                                                 this result tra

nsmitted



                                                                 reference range

: <=1.0.



                                                                 The reference r

zhen was



                                                                 not used to int

erpret



                                                                 this result as



                                                                 normal/abnormal

.



[facility] StephenvilleReacqatBHUOKXFBDF2824-17-31 09:36:00





             Test Item    Value        Reference Range Interpretation Comments

 

             Monocytes (test code = Monocytes) 10.5         2.0-12.0            

      



[facility] StephenvilleHjvutxbIBQDLFCEGH7959-45-27 09:36:00





             Test Item    Value        Reference Range Interpretation Comments

 

             Eosinophils (test code = 3.3          See_Comment                [A

utomated message] The



             Eosinophils)                                        system which ge

nerated



                                                                 this result tra

nsmitted



                                                                 reference range

: <=4.0.



                                                                 The reference r

zhen was



                                                                 not used to int

erpret



                                                                 this result as



                                                                 normal/abnormal

.



[facility] StephenvilleRphvgpbLVWJMXNHBA8326-38-65 09:36:00





             Test Item    Value        Reference Range Interpretation Comments

 

             Segs-Bands # (test code = Segs-Bands #) 3.9          1.5-8.1       

            



[facility]2018-05-07 09:36:00





             Test Item    Value        Reference Range Interpretation Comments

 

             Globulin (test code = Globulin) 4.4          2.7-4.2               

    



[facility]2018-05-07 09:36:00





             Test Item    Value        Reference Range Interpretation Comments

 

             A/G Ratio (test code = A/G Ratio) 0.6 1        0.7-1.6             

      



[facility]2018-05-07 09:36:00





             Test Item    Value        Reference Range Interpretation Comments

 

             B/C Ratio (test code = B/C Ratio) 17 1         6-25                

      



[facility]2018-05-07 09:36:00





             Test Item    Value        Reference Range Interpretation Comments

 

             AGAP (test code = AGAP) 11.3         10.0-20.0                 



[facility]2018-05-07 09:36:00





             Test Item    Value        Reference Range Interpretation Comments

 

             eGFR (test code = eGFR) 134                                    



[facility]2018-05-07 09:36:00





             Test Item    Value        Reference Range Interpretation Comments

 

             Creatinine Lvl (test code = Creatinine 0.84         0.50-1.40      

           



             Lvl)                                                



[facility]2018-05-07 09:36:00





             Test Item    Value        Reference Range Interpretation Comments

 

             Sodium Lvl (test code = Sodium Lvl) 142          135-145           

        



[place] 658808-05-07 09:36:00





             Test Item    Value        Reference Range Interpretation Comments

 

             Glucose Lvl (test code = Glucose Lvl) 99           70-99           

          



[facility]2018-05-07 09:36:00





             Test Item    Value        Reference Range Interpretation Comments

 

             BUN (test code = BUN) 14           7-22                      



[place] 658808-05-07 09:36:00





             Test Item    Value        Reference Range Interpretation Comments

 

             Alk Phos (test code = Alk Phos) 79                           

    



[place] 658808-05-07 09:36:00





             Test Item    Value        Reference Range Interpretation Comments

 

             Bili Total (test code = Bili Total) 0.3          0.2-1.3           

        



[place] 658808-05-07 09:36:00





             Test Item    Value        Reference Range Interpretation Comments

 

             AST (test code = AST) 14           See_Comment                [Auto

mated message] The



                                                                 system which ge

nerated this



                                                                 result transmit

huma



                                                                 reference range

: <=37. The



                                                                 reference range

 was not



                                                                 used to interpr

et this



                                                                 result as zayda

l/abnormal.



[place] 658808-05-07 09:36:00





             Test Item    Value        Reference Range Interpretation Comments

 

             ALT (test code = ALT) 43           See_Comment                [Auto

mated message] The



                                                                 system which ge

nerated this



                                                                 result transmit

huma



                                                                 reference range

: <=65. The



                                                                 reference range

 was not



                                                                 used to interpr

et this



                                                                 result as zayda

l/abnormal.



[place] 658808-05-07 09:36:00





             Test Item    Value        Reference Range Interpretation Comments

 

             Total Protein (test code = Total 7.1          6.4-8.4              

     



             Protein)                                            



[place] 658808-05-07 09:36:00





             Test Item    Value        Reference Range Interpretation Comments

 

             Albumin Lvl (test code = Albumin Lvl) 2.7          3.5-5.0         

          



[place] 658808-05-07 09:36:00





             Test Item    Value        Reference Range Interpretation Comments

 

             Calcium Lvl (test code = Calcium Lvl) 9.2          8.5-10.5        

          



[facility]2018-05-07 09:36:00





             Test Item    Value        Reference Range Interpretation Comments

 

             CO2 (test code = CO2) 21           24-32                     



[facility]2018-05-07 09:36:00





             Test Item    Value        Reference Range Interpretation Comments

 

             Potassium Lvl (test code = Potassium 4.3          3.5-5.1          

         



             Lvl)                                                



[facility]2018-05-07 09:36:00





             Test Item    Value        Reference Range Interpretation Comments

 

             Chloride Lvl (test code = Chloride Lvl) 114                  

            



[facility]villeApqrlnqWDWWPHOEPV3905-04-86 09:36:00





             Test Item    Value        Reference Range Interpretation Comments

 

             MCHC (test code = MCHC) 32.5         32.0-36.0                 



[facility]villeNapotlwBTIERHXLXV8176-18-83 09:36:00





             Test Item    Value        Reference Range Interpretation Comments

 

             RDW (test code = RDW) 17.5         11.5-14.5                 



[facility]villeFfeiimkBVWEVMPTFJ7050-08-08 09:36:00





             Test Item    Value        Reference Range Interpretation Comments

 

             Platelet (test code = Platelet) 400          133-450               

    



[facility] StephenvilleRiskkffWEJQXMYYOK4669-26-92 09:36:00





             Test Item    Value        Reference Range Interpretation Comments

 

             MPV (test code = MPV) 8.5          7.4-10.4                  



[facility] StephenvillePswwllxJNIETKSYBA8890-59-97 09:36:00





             Test Item    Value        Reference Range Interpretation Comments

 

             WBC (test code = WBC) 6.6          3.7-10.4                  



[facility] StephenvilleQntlcgpNGOWLYCUID9784-04-10 09:36:00





             Test Item    Value        Reference Range Interpretation Comments

 

             RBC (test code = RBC) 5.17         4.70-6.10                 



[facility] StephenvilleZgyqdvsWKVPWAFAUS3574-99-22 09:36:00





             Test Item    Value        Reference Range Interpretation Comments

 

             MCV (test code = MCV) 86.1         80.0-94.0                 



[facility] StephenvilleDmuilxkVMFCKSALHM7122-17-62 09:36:00





             Test Item    Value        Reference Range Interpretation Comments

 

             Hct (test code = Hct) 44.5         42.0-54.0                 



[facility] StephenvilleVmdbivdUIDLILCXLU5053-16-04 09:36:00





             Test Item    Value        Reference Range Interpretation Comments

 

             MCH (test code = MCH) 28.0 pg      27.0-31.0                 



[facility] StephenvilleWiwydkqJFDOVLRTHV2368-27-33 09:36:00





             Test Item    Value        Reference Range Interpretation Comments

 

             Hgb (test code = Hgb) 14.5         14.0-18.0                 



[facility] StephenvilleWhbdesvEGYAWNPSET6577-46-24 09:36:00





             Test Item    Value        Reference Range Interpretation Comments

 

             Lymphocytes # (test code = Lymphocytes 1.7          1.0-5.5        

           



             #)                                                  



[facility]villePqpdtxsDAUBPOOTAL1098-40-77 09:36:00





             Test Item    Value        Reference Range Interpretation Comments

 

             Monocytes # (test code 0.7          See_Comment                [Aut

omated message] The



             = Monocytes #)                                        system which 

generated



                                                                 this result tra

nsmitted



                                                                 reference range

: <=0.8.



                                                                 The reference r

zhen was



                                                                 not used to int

erpret



                                                                 this result as



                                                                 normal/abnormal

.



[facility] StephenvilleVcmhimcLOKCWOJKDW2612-84-97 09:36:00





             Test Item    Value        Reference Range Interpretation Comments

 

             Eosinophils # (test code 0.2          See_Comment                [A

utomated message] The



             = Eosinophils #)                                        system whic

h generated



                                                                 this result tra

nsmitted



                                                                 reference range

: <=0.5.



                                                                 The reference r

hzen was



                                                                 not used to int

erpret



                                                                 this result as



                                                                 normal/abnormal

.



[facility] StephenvilleWxxffsgOWEWJGEYLU9918-22-46 09:36:00





             Test Item    Value        Reference Range Interpretation Comments

 

             Lymphocytes (test code = Lymphocytes) 26.1         20.0-40.0       

          



[facility] StephenvilleXabvhomQRYQODKUDT7557-66-26 09:36:00





             Test Item    Value        Reference Range Interpretation Comments

 

             Segs (test code = Segs) 59.3         45.0-75.0                 



[facility] StephenvilleBugtlenROPNHVJIUF6433-23-65 09:36:00





             Test Item    Value        Reference Range Interpretation Comments

 

             Basophils (test code = 0.8          See_Comment                [Aut

omated message] The



             Basophils)                                          system which ge

nerated



                                                                 this result tra

nsmitted



                                                                 reference range

: <=1.0.



                                                                 The reference r

zhen was



                                                                 not used to int

erpret



                                                                 this result as



                                                                 normal/abnormal

.



[facility] StephenvilleHweytbeLHAYRTQJTH7293-26-29 09:36:00





             Test Item    Value        Reference Range Interpretation Comments

 

             Monocytes (test code = Monocytes) 10.5         2.0-12.0            

      



[facility] StephenvilleOytunxqNORWVSGFTE5255-15-37 09:36:00





             Test Item    Value        Reference Range Interpretation Comments

 

             Eosinophils (test code = 3.3          See_Comment                [A

utomated message] The



             Eosinophils)                                        system which ge

nerated



                                                                 this result tra

nsmitted



                                                                 reference range

: <=4.0.



                                                                 The reference r

zhen was



                                                                 not used to int

erpret



                                                                 this result as



                                                                 normal/abnormal

.



[facility] StephenvilleVnjixamEPIPBVQNVT6093-21-41 09:36:00





             Test Item    Value        Reference Range Interpretation Comments

 

             Segs-Bands # (test code = Segs-Bands #) 3.9          1.5-8.1       

            



[facility]2018-05-07 09:36:00





             Test Item    Value        Reference Range Interpretation Comments

 

             Globulin (test code = Globulin) 4.4          2.7-4.2               

    



[facility]2018-05-07 09:36:00





             Test Item    Value        Reference Range Interpretation Comments

 

             A/G Ratio (test code = A/G Ratio) 0.6 1        0.7-1.6             

      



[place] 658808-05-07 09:36:00





             Test Item    Value        Reference Range Interpretation Comments

 

             B/C Ratio (test code = B/C Ratio) 17 1         6-25                

      



[place] 658808-05-07 09:36:00





             Test Item    Value        Reference Range Interpretation Comments

 

             AGAP (test code = AGAP) 11.3         10.0-20.0                 



[facility]2018-05-07 09:36:00





             Test Item    Value        Reference Range Interpretation Comments

 

             eGFR (test code = eGFR) 134                                    



[facility]2018-05-07 09:36:00





             Test Item    Value        Reference Range Interpretation Comments

 

             Creatinine Lvl (test code = Creatinine 0.84         0.50-1.40      

           



             Lvl)                                                



[facility]2018-05-07 09:36:00





             Test Item    Value        Reference Range Interpretation Comments

 

             Sodium Lvl (test code = Sodium Lvl) 142          135-145           

        



[facility]2018-05-07 09:36:00





             Test Item    Value        Reference Range Interpretation Comments

 

             Glucose Lvl (test code = Glucose Lvl) 99           70-99           

          



[facility]2018-05-07 09:36:00





             Test Item    Value        Reference Range Interpretation Comments

 

             BUN (test code = BUN) 14           7-22                      



[place] 658808-05-07 09:36:00





             Test Item    Value        Reference Range Interpretation Comments

 

             Alk Phos (test code = Alk Phos) 79                           

    



[place] 658808-05-07 09:36:00





             Test Item    Value        Reference Range Interpretation Comments

 

             Bili Total (test code = Bili Total) 0.3          0.2-1.3           

        



[facility]2018-05-07 09:36:00





             Test Item    Value        Reference Range Interpretation Comments

 

             AST (test code = AST) 14           See_Comment                [Auto

mated message] The



                                                                 system which ge

nerated this



                                                                 result transmit

huma



                                                                 reference range

: <=37. The



                                                                 reference range

 was not



                                                                 used to interpr

et this



                                                                 result as zayda

l/abnormal.



[place] 658808-05-07 09:36:00





             Test Item    Value        Reference Range Interpretation Comments

 

             ALT (test code = ALT) 43           See_Comment                [Auto

mated message] The



                                                                 system which ge

nerated this



                                                                 result transmit

huma



                                                                 reference range

: <=65. The



                                                                 reference range

 was not



                                                                 used to interpr

et this



                                                                 result as zayda

l/abnormal.



[place] 658808-05-07 09:36:00





             Test Item    Value        Reference Range Interpretation Comments

 

             Total Protein (test code = Total 7.1          6.4-8.4              

     



             Protein)                                            



[place] 658808-05-07 09:36:00





             Test Item    Value        Reference Range Interpretation Comments

 

             Albumin Lvl (test code = Albumin Lvl) 2.7          3.5-5.0         

          



[place] 658808-05-07 09:36:00





             Test Item    Value        Reference Range Interpretation Comments

 

             Calcium Lvl (test code = Calcium Lvl) 9.2          8.5-10.5        

          



[place] 658808-05-07 09:36:00





             Test Item    Value        Reference Range Interpretation Comments

 

             CO2 (test code = CO2) 21           24-32                     



[place] 658808-05-07 09:36:00





             Test Item    Value        Reference Range Interpretation Comments

 

             Potassium Lvl (test code = Potassium 4.3          3.5-5.1          

         



             Lvl)                                                



[facility]2018-05-07 09:36:00





             Test Item    Value        Reference Range Interpretation Comments

 

             Chloride Lvl (test code = Chloride Lvl) 114                  

            



[facility]





             Test Item    Value        Reference Range Interpretation Comments

 

             MCHC (test code = MCHC) 32.5         32.0-36.0                 



[facility]villeDjbgcwpBEBVYWRDCE5230-64-46 09:36:00





             Test Item    Value        Reference Range Interpretation Comments

 

             RDW (test code = RDW) 17.5         11.5-14.5                 



[place] 104698-05-07 09:36:00





             Test Item    Value        Reference Range Interpretation Comments

 

             Platelet (test code = Platelet) 400          133-450               

    



[facility]cydqfhSHOEWUWTIC5671-17-79 09:36:00





             Test Item    Value        Reference Range Interpretation Comments

 

             MPV (test code = MPV) 8.5          7.4-10.4                  



[facility]villeWgouimmXILTJAZOFY6667-03-08 09:36:00





             Test Item    Value        Reference Range Interpretation Comments

 

             WBC (test code = WBC) 6.6          3.7-10.4                  



[facility]villeEvjrkzwUOLUAWAEUD9103-68-32 09:36:00





             Test Item    Value        Reference Range Interpretation Comments

 

             RBC (test code = RBC) 5.17         4.70-6.10                 



[place] [id number] 21:02:00





             Test Item    Value        Reference Range Interpretation Comments

 

             Vanco Tr TND (test code = Vanco Tr 15:30pm                         

       



             TND)                                                



[place] [id number] 21:02:00





             Test Item    Value        Reference Range Interpretation Comments

 

             Vanco Tr (test code = Vanco Tr) 9.1                                

    



[place] [id number] 21:02:00





             Test Item    Value        Reference Range Interpretation Comments

 

             Vanco Tr TND (test code = Vanco Tr 15:30pm                         

       



             TND)                                                



[place] [id number] 21:02:00





             Test Item    Value        Reference Range Interpretation Comments

 

             Vanco Tr (test code = Vanco Tr) 9.1                                

    



[place] [id number] 21:02:00





             Test Item    Value        Reference Range Interpretation Comments

 

             Vanco Tr TND (test code = Vanco Tr 15:30pm                         

       



             TND)                                                



[place] [id number] 21:02:00





             Test Item    Value        Reference Range Interpretation Comments

 

             Vanco Tr (test code = Vanco Tr) 9.1                                

    



[facility]2018-05-06 07:07:00





             Test Item    Value        Reference Range Interpretation Comments

 

             Glucose Lvl (test code = Glucose Lvl) 74           70-99           

          



[facility]2018-05-06 07:07:00





             Test Item    Value        Reference Range Interpretation Comments

 

             BUN (test code = BUN) 12           7-22                      



[facility]2018-05-06 07:07:00





             Test Item    Value        Reference Range Interpretation Comments

 

             Creatinine Lvl (test code = Creatinine 0.75         0.50-1.40      

           



             Lvl)                                                



[facility]2018-05-06 07:07:00





             Test Item    Value        Reference Range Interpretation Comments

 

             eGFR (test code = eGFR) 140                                    



[facility]2018-05-06 07:07:00





             Test Item    Value        Reference Range Interpretation Comments

 

             Albumin Lvl (test code = Albumin Lvl) 2.9          3.5-5.0         

          



[place] 658808-05-06 07:07:00





             Test Item    Value        Reference Range Interpretation Comments

 

             Globulin (test code = Globulin) 4.4          2.7-4.2               

    



[place] 658808-05-06 07:07:00





             Test Item    Value        Reference Range Interpretation Comments

 

             A/G Ratio (test code = A/G Ratio) 0.7 1        0.7-1.6             

      



[place] 658808-05-06 07:07:00





             Test Item    Value        Reference Range Interpretation Comments

 

             Bili Total (test code = Bili Total) 0.4          0.2-1.3           

        



[place] 658808-05-06 07:07:00





             Test Item    Value        Reference Range Interpretation Comments

 

             Alk Phos (test code = Alk Phos) 71                           

    



[place] 658808-05-06 07:07:00





             Test Item    Value        Reference Range Interpretation Comments

 

             AST (test code = AST) 15           See_Comment                [Auto

mated message] The



                                                                 system which ge

nerated this



                                                                 result transmit

huma



                                                                 reference range

: <=37. The



                                                                 reference range

 was not



                                                                 used to interpr

et this



                                                                 result as zayda

l/abnormal.



[facility]2018-05-06 07:07:00





             Test Item    Value        Reference Range Interpretation Comments

 

             ALT (test code = ALT) 28           See_Comment                [Auto

mated message] The



                                                                 system which ge

nerated this



                                                                 result transmit

huma



                                                                 reference range

: <=65. The



                                                                 reference range

 was not



                                                                 used to interpr

et this



                                                                 result as zayda

l/abnormal.



[place] 658808-05-06 07:07:00





             Test Item    Value        Reference Range Interpretation Comments

 

             Potassium Lvl (test code = Potassium 4.4          3.5-5.1          

         



             Lvl)                                                



[facility]2018-05-06 07:07:00





             Test Item    Value        Reference Range Interpretation Comments

 

             Sodium Lvl (test code = Sodium Lvl) 147          135-145           

        



[place] 658808-05-06 07:07:00





             Test Item    Value        Reference Range Interpretation Comments

 

             CO2 (test code = CO2) 25           24-32                     



[facility]2018-05-06 07:07:00





             Test Item    Value        Reference Range Interpretation Comments

 

             Chloride Lvl (test code = Chloride Lvl) 114                  

            



[facility]2018-05-06 07:07:00





             Test Item    Value        Reference Range Interpretation Comments

 

             B/C Ratio (test code = B/C Ratio) 16 1         6-25                

      



[place] 658808-05-06 07:07:00





             Test Item    Value        Reference Range Interpretation Comments

 

             Calcium Lvl (test code = Calcium Lvl) 8.7          8.5-10.5        

          



[facility]2018-05-06 07:07:00





             Test Item    Value        Reference Range Interpretation Comments

 

             AGAP (test code = AGAP) 12.4         10.0-20.0                 



[facility]2018-05-06 07:07:00





             Test Item    Value        Reference Range Interpretation Comments

 

             Total Protein (test code = Total 7.3          6.4-8.4              

     



             Protein)                                            



[facility]villeGohlfgqNPYFVLKUDU1362-33-91 07:07:00





             Test Item    Value        Reference Range Interpretation Comments

 

             Platelet (test code = Platelet) 345          133-450               

    



[facility]villeMclvvtxWHEKUYQKCC2877-77-51 07:07:00





             Test Item    Value        Reference Range Interpretation Comments

 

             MPV (test code = MPV) 8.7          7.4-10.4                  



[place] 104698-05-06 07:07:00





             Test Item    Value        Reference Range Interpretation Comments

 

             WBC (test code = WBC) 6.9          3.7-10.4                  



[place] 104698-05-06 07:07:00





             Test Item    Value        Reference Range Interpretation Comments

 

             RBC (test code = RBC) 5.30         4.70-6.10                 



[facility]villeOvsrjwdHOSMUVNGGT3723-28-65 07:07:00





             Test Item    Value        Reference Range Interpretation Comments

 

             MCHC (test code = MCHC) 32.8         32.0-36.0                 



[facility] StephenvilleUrlgkyuZMVEJAPTNT1929-99-83 07:07:00





             Test Item    Value        Reference Range Interpretation Comments

 

             MCH (test code = MCH) 27.9 pg      27.0-31.0                 



[facility] StephenvilleWnkyfimKIHOQZRMXR5642-62-68 07:07:00





             Test Item    Value        Reference Range Interpretation Comments

 

             Hgb (test code = Hgb) 14.8         14.0-18.0                 



[facility] StephenvilleEtlrjryPZRKLZBXPY4378-81-99 07:07:00





             Test Item    Value        Reference Range Interpretation Comments

 

             MCV (test code = MCV) 85.0         80.0-94.0                 



[facility] StephenvilleOdlvlbiYFSBFEIVJM6808-51-51 07:07:00





             Test Item    Value        Reference Range Interpretation Comments

 

             Hct (test code = Hct) 45.1         42.0-54.0                 



[facility] StephenvilleUdjzruzRVOFMFNPUV6734-37-64 07:07:00





             Test Item    Value        Reference Range Interpretation Comments

 

             RDW (test code = RDW) 17.5         11.5-14.5                 



[facility] StephenvilleHaxvoczBTKFLXPUUQ4694-76-09 07:07:00





             Test Item    Value        Reference Range Interpretation Comments

 

             Eosinophils # (test code 0.2          See_Comment                [A

utomated message] The



             = Eosinophils #)                                        system whic

h generated



                                                                 this result tra

nsmitted



                                                                 reference range

: <=0.5.



                                                                 The reference r

zhen was



                                                                 not used to int

erpret



                                                                 this result as



                                                                 normal/abnormal

.



[facility] StephenvilleRrbqzlhQFEYXWZCQS6710-57-85 07:07:00





             Test Item    Value        Reference Range Interpretation Comments

 

             Lymphocytes # (test code = Lymphocytes 2.0          1.0-5.5        

           



             #)                                                  



[facility] StephenvilleJuchmseNHSSFQGSCJ9741-58-80 07:07:00





             Test Item    Value        Reference Range Interpretation Comments

 

             Monocytes # (test code 0.7          See_Comment                [Aut

omated message] The



             = Monocytes #)                                        system which 

generated



                                                                 this result tra

nsmitted



                                                                 reference range

: <=0.8.



                                                                 The reference r

zhen was



                                                                 not used to int

erpret



                                                                 this result as



                                                                 normal/abnormal

.



[facility] StephenvilleHxvninyELYKRNSQEG1767-70-26 07:07:00





             Test Item    Value        Reference Range Interpretation Comments

 

             Eosinophils (test code = 2.4          See_Comment                [A

utomated message] The



             Eosinophils)                                        system which ge

nerated



                                                                 this result tra

nsmitted



                                                                 reference range

: <=4.0.



                                                                 The reference r

zhen was



                                                                 not used to int

erpret



                                                                 this result as



                                                                 normal/abnormal

.



[facility] StephenvilleZpptndtQHWJWSIBXV6444-83-62 07:07:00





             Test Item    Value        Reference Range Interpretation Comments

 

             Basophils (test code = 0.6          See_Comment                [Aut

omated message] The



             Basophils)                                          system which ge

nerated



                                                                 this result tra

nsmitted



                                                                 reference range

: <=1.0.



                                                                 The reference r

zhen was



                                                                 not used to int

erpret



                                                                 this result as



                                                                 normal/abnormal

.



[facility] StephenvilleZqctfbfDWBTVKERSV3039-35-28 07:07:00





             Test Item    Value        Reference Range Interpretation Comments

 

             Segs-Bands # (test code = Segs-Bands #) 4.0          1.5-8.1       

            



[facility] StephenvilleXelcohwINHZJSVISB3502-48-21 07:07:00





             Test Item    Value        Reference Range Interpretation Comments

 

             Monocytes (test code = Monocytes) 10.4         2.0-12.0            

      



[facility] StephenvilleBvmustbEGNEMWFTHK7400-16-20 07:07:00





             Test Item    Value        Reference Range Interpretation Comments

 

             RBC Morph (test code = Normal (18 2:07 AM)                     

      



             RBC Morph)                                          



[facility] StephenvilleXaihaohSRRZXVPFUJ6911-70-82 07:07:00





             Test Item    Value        Reference Range Interpretation Comments

 

             Segs (test code = Segs) 58.1         45.0-75.0                 



[facility] StephenvilleJswkgouWXMRYJIAKV9099-79-89 07:07:00





             Test Item    Value        Reference Range Interpretation Comments

 

             Plt Morph (test code = Normal (18 2:07 AM)                     

      



             Plt Morph)                                          



[facility] StephenvillePvrsuiiATZRFBMVXX1161-01-63 07:07:00





             Test Item    Value        Reference Range Interpretation Comments

 

             Lymphocytes (test code = Lymphocytes) 28.5         20.0-40.0       

          



[facility]2018-05-06 07:07:00





             Test Item    Value        Reference Range Interpretation Comments

 

             Glucose Lvl (test code = Glucose Lvl) 74           70-99           

          



[facility]2018-05-06 07:07:00





             Test Item    Value        Reference Range Interpretation Comments

 

             BUN (test code = BUN) 12           7-22                      



[facility]2018-05-06 07:07:00





             Test Item    Value        Reference Range Interpretation Comments

 

             Creatinine Lvl (test code = Creatinine 0.75         0.50-1.40      

           



             Lvl)                                                



[facility]2018-05-06 07:07:00





             Test Item    Value        Reference Range Interpretation Comments

 

             eGFR (test code = eGFR) 140                                    



[facility]2018-05-06 07:07:00





             Test Item    Value        Reference Range Interpretation Comments

 

             Albumin Lvl (test code = Albumin Lvl) 2.9          3.5-5.0         

          



[facility]2018-05-06 07:07:00





             Test Item    Value        Reference Range Interpretation Comments

 

             Globulin (test code = Globulin) 4.4          2.7-4.2               

    



[place] 658808-05-06 07:07:00





             Test Item    Value        Reference Range Interpretation Comments

 

             A/G Ratio (test code = A/G Ratio) 0.7 1        0.7-1.6             

      



[place] 658808-05-06 07:07:00





             Test Item    Value        Reference Range Interpretation Comments

 

             Bili Total (test code = Bili Total) 0.4          0.2-1.3           

        



[place] [address]-05-06 07:07:00





             Test Item    Value        Reference Range Interpretation Comments

 

             Alk Phos (test code = Alk Phos) 71                           

    



[place] 658808-05-06 07:07:00





             Test Item    Value        Reference Range Interpretation Comments

 

             AST (test code = AST) 15           See_Comment                [Auto

mated message] The



                                                                 system which ge

nerated this



                                                                 result transmit

huma



                                                                 reference range

: <=37. The



                                                                 reference range

 was not



                                                                 used to interpr

et this



                                                                 result as zayda

l/abnormal.



[place] [address]-05-06 07:07:00





             Test Item    Value        Reference Range Interpretation Comments

 

             ALT (test code = ALT) 28           See_Comment                [Auto

mated message] The



                                                                 system which ge

nerated this



                                                                 result transmit

huma



                                                                 reference range

: <=65. The



                                                                 reference range

 was not



                                                                 used to interpr

et this



                                                                 result as zayda

l/abnormal.



[place]58808-05-06 07:07:00





             Test Item    Value        Reference Range Interpretation Comments

 

             Potassium Lvl (test code = Potassium 4.4          3.5-5.1          

         



             Lvl)                                                



[facility]2018-05-06 07:07:00





             Test Item    Value        Reference Range Interpretation Comments

 

             Sodium Lvl (test code = Sodium Lvl) 147          135-145           

        



[place] 658808-05-06 07:07:00





             Test Item    Value        Reference Range Interpretation Comments

 

             CO2 (test code = CO2) 25           24-32                     



[place] 658808-05-06 07:07:00





             Test Item    Value        Reference Range Interpretation Comments

 

             Chloride Lvl (test code = Chloride Lvl) 114                  

            



[place] 658808-05-06 07:07:00





             Test Item    Value        Reference Range Interpretation Comments

 

             B/C Ratio (test code = B/C Ratio) 16 1         6-25                

      



[place] 658808-05-06 07:07:00





             Test Item    Value        Reference Range Interpretation Comments

 

             Calcium Lvl (test code = Calcium Lvl) 8.7          8.5-10.5        

          



[facility] [id number] 07:07:00





             Test Item    Value        Reference Range Interpretation Comments

 

             AGAP (test code = AGAP) 12.4         10.0-20.0                 



[facility]2018-05-06 07:07:00





             Test Item    Value        Reference Range Interpretation Comments

 

             Total Protein (test code = Total 7.3          6.4-8.4              

     



             Protein)                                            



[facility]uz[facility]LYHQ[facility]FSOPR0905[facility]





             Test Item    Value        Reference Range Interpretation Comments

 

             Platelet (test code = Platelet) 345          133-450               

    



[facility]villeVuxvwxoDVSVDKFISM7942-99-09 07:07:00





             Test Item    Value        Reference Range Interpretation Comments

 

             MPV (test code = MPV) 8.7          7.4-10.4                  



[facility]villeRwvqfyxXHKEQMAKVV6103-58-92 07:07:00





             Test Item    Value        Reference Range Interpretation Comments

 

             WBC (test code = WBC) 6.9          3.7-10.4                  



[facility]villeOjcanaqRKTBCXMYUA1193-31-81 07:07:00





             Test Item    Value        Reference Range Interpretation Comments

 

             RBC (test code = RBC) 5.30         4.70-6.10                 



[facility]villeBvohogdEGWPWUZAYO0908-78-57 07:07:00





             Test Item    Value        Reference Range Interpretation Comments

 

             MCHC (test code = MCHC) 32.8         32.0-36.0                 



[facility]villeDmwfkwzTZTOKVDHSD7806-90-35 07:07:00





             Test Item    Value        Reference Range Interpretation Comments

 

             MCH (test code = MCH) 27.9 pg      27.0-31.0                 



[facility]villeKcqqrxbJDYLCUGJZS5672-59-74 07:07:00





             Test Item    Value        Reference Range Interpretation Comments

 

             Hgb (test code = Hgb) 14.8         14.0-18.0                 



[facility]villeOcpersoEMYJCRHYOE5106-21-33 07:07:00





             Test Item    Value        Reference Range Interpretation Comments

 

             MCV (test code = MCV) 85.0         80.0-94.0                 



[facility]villeWunpykpZEZJSXSRDG2367-93-29 07:07:00





             Test Item    Value        Reference Range Interpretation Comments

 

             Hct (test code = Hct) 45.1         42.0-54.0                 



[place] 104698-05-06 07:07:00





             Test Item    Value        Reference Range Interpretation Comments

 

             RDW (test code = RDW) 17.5         11.5-14.5                 



[facility] StephenvilleXnaxvurAOAQTXJSIB7825-07-79 07:07:00





             Test Item    Value        Reference Range Interpretation Comments

 

             Eosinophils # (test code 0.2          See_Comment                [A

utomated message] The



             = Eosinophils #)                                        system whic

h generated



                                                                 this result tra

nsmitted



                                                                 reference range

: <=0.5.



                                                                 The reference r

zhen was



                                                                 not used to int

erpret



                                                                 this result as



                                                                 normal/abnormal

.



[facility]villeIptsvmdBHDZXHFSJQ9084-05-50 07:07:00





             Test Item    Value        Reference Range Interpretation Comments

 

             Lymphocytes # (test code = Lymphocytes 2.0          1.0-5.5        

           



             #)                                                  



[facility] StephenvilleRghlebuNDPFICUATR2209-49-92 07:07:00





             Test Item    Value        Reference Range Interpretation Comments

 

             Monocytes # (test code 0.7          See_Comment                [Aut

omated message] The



             = Monocytes #)                                        system which 

generated



                                                                 this result tra

nsmitted



                                                                 reference range

: <=0.8.



                                                                 The reference r

zhen was



                                                                 not used to int

erpret



                                                                 this result as



                                                                 normal/abnormal

.



[facility] StephenvilleCrvkejkQUOVFYXQVL1941-63-40 07:07:00





             Test Item    Value        Reference Range Interpretation Comments

 

             Eosinophils (test code = 2.4          See_Comment                [A

utomated message] The



             Eosinophils)                                        system which ge

nerated



                                                                 this result tra

nsmitted



                                                                 reference range

: <=4.0.



                                                                 The reference r

zhen was



                                                                 not used to int

erpret



                                                                 this result as



                                                                 normal/abnormal

.



[facility] StephenvilleDlinqnjAVFEIULNHC3819-10-35 07:07:00





             Test Item    Value        Reference Range Interpretation Comments

 

             Basophils (test code = 0.6          See_Comment                [Aut

omated message] The



             Basophils)                                          system which ge

nerated



                                                                 this result tra

nsmitted



                                                                 reference range

: <=1.0.



                                                                 The reference r

zhen was



                                                                 not used to int

erpret



                                                                 this result as



                                                                 normal/abnormal

.



[facility] StephenvilleYaeqvgsNCPUXFPVND3255-74-13 07:07:00





             Test Item    Value        Reference Range Interpretation Comments

 

             Segs-Bands # (test code = Segs-Bands #) 4.0          1.5-8.1       

            



[facility] StephenvilleDcokoqfINFVSNENPP7677-89-19 07:07:00





             Test Item    Value        Reference Range Interpretation Comments

 

             Monocytes (test code = Monocytes) 10.4         2.0-12.0            

      



[facility] StephenvilleYakfxxxEOIQQSMURY2916-42-38 07:07:00





             Test Item    Value        Reference Range Interpretation Comments

 

             RBC Morph (test code = Normal (18 2:07 AM)                     

      



             RBC Morph)                                          



[facility] StephenvilleJohzddhZRAKXPAOVB5563-86-45 07:07:00





             Test Item    Value        Reference Range Interpretation Comments

 

             Segs (test code = Segs) 58.1         45.0-75.0                 



[facility] StephenvilleMgtqsjcMOSKETAFWK4613-91-15 07:07:00





             Test Item    Value        Reference Range Interpretation Comments

 

             Plt Morph (test code = Normal (18 2:07 AM)                     

      



             Plt Morph)                                          



[facility] StephenvilleJkdrsdvFKTKGHBEBQ0477-26-48 07:07:00





             Test Item    Value        Reference Range Interpretation Comments

 

             Lymphocytes (test code = Lymphocytes) 28.5         20.0-40.0       

          



[facility]2018-05-06 07:07:00





             Test Item    Value        Reference Range Interpretation Comments

 

             Glucose Lvl (test code = Glucose Lvl) 74           70-99           

          



[facility]2018-05-06 07:07:00





             Test Item    Value        Reference Range Interpretation Comments

 

             BUN (test code = BUN) 12           7-22                      



[facility]2018-05-06 07:07:00





             Test Item    Value        Reference Range Interpretation Comments

 

             Creatinine Lvl (test code = Creatinine 0.75         0.50-1.40      

           



             Lvl)                                                



[facility]2018-05-06 07:07:00





             Test Item    Value        Reference Range Interpretation Comments

 

             eGFR (test code = eGFR) 140                                    



[facility]2018-05-06 07:07:00





             Test Item    Value        Reference Range Interpretation Comments

 

             Albumin Lvl (test code = Albumin Lvl) 2.9          3.5-5.0         

          



[facility]2018-05-06 07:07:00





             Test Item    Value        Reference Range Interpretation Comments

 

             Globulin (test code = Globulin) 4.4          2.7-4.2               

    



[facility]2018-05-06 07:07:00





             Test Item    Value        Reference Range Interpretation Comments

 

             A/G Ratio (test code = A/G Ratio) 0.7 1        0.7-1.6             

      



[facility]2018-05-06 07:07:00





             Test Item    Value        Reference Range Interpretation Comments

 

             Bili Total (test code = Bili Total) 0.4          0.2-1.3           

        



[facility]2018-05-06 07:07:00





             Test Item    Value        Reference Range Interpretation Comments

 

             Alk Phos (test code = Alk Phos) 71                           

    



[facility]2018-05-06 07:07:00





             Test Item    Value        Reference Range Interpretation Comments

 

             AST (test code = AST) 15           See_Comment                [Auto

mated message] The



                                                                 system which ge

nerated this



                                                                 result transmit

huma



                                                                 reference range

: <=37. The



                                                                 reference range

 was not



                                                                 used to interpr

et this



                                                                 result as zayda

l/abnormal.



[facility]2018-05-06 07:07:00





             Test Item    Value        Reference Range Interpretation Comments

 

             ALT (test code = ALT) 28           See_Comment                [Auto

mated message] The



                                                                 system which ge

nerated this



                                                                 result transmit

huma



                                                                 reference range

: <=65. The



                                                                 reference range

 was not



                                                                 used to interpr

et this



                                                                 result as zayda

l/abnormal.



[place] 658808-05-06 07:07:00





             Test Item    Value        Reference Range Interpretation Comments

 

             Potassium Lvl (test code = Potassium 4.4          3.5-5.1          

         



             Lvl)                                                



[facility]2018-05-06 07:07:00





             Test Item    Value        Reference Range Interpretation Comments

 

             Sodium Lvl (test code = Sodium Lvl) 147          135-145           

        



[place] 658808-05-06 07:07:00





             Test Item    Value        Reference Range Interpretation Comments

 

             CO2 (test code = CO2) 25           24-32                     



[place] 658808-05-06 07:07:00





             Test Item    Value        Reference Range Interpretation Comments

 

             Chloride Lvl (test code = Chloride Lvl) 114                  

            



[facility]2018-05-06 07:07:00





             Test Item    Value        Reference Range Interpretation Comments

 

             B/C Ratio (test code = B/C Ratio) 16 1         6-25                

      



[place] 658808-05-06 07:07:00





             Test Item    Value        Reference Range Interpretation Comments

 

             Calcium Lvl (test code = Calcium Lvl) 8.7          8.5-10.5        

          



[place] 658808-05-06 07:07:00





             Test Item    Value        Reference Range Interpretation Comments

 

             AGAP (test code = AGAP) 12.4         10.0-20.0                 



[place] 658808-05-06 07:07:00





             Test Item    Value        Reference Range Interpretation Comments

 

             Total Protein (test code = Total 7.3          6.4-8.4              

     



             Protein)                                            



[facility]villeDfhlyjzWHXQCPZGXN0375-49-47 07:07:00





             Test Item    Value        Reference Range Interpretation Comments

 

             Platelet (test code = Platelet) 345          133-450               

    



[place] 104698-05-06 07:07:00





             Test Item    Value        Reference Range Interpretation Comments

 

             MPV (test code = MPV) 8.7          7.4-10.4                  



[facility]villeQvmzrtbPWDQYJDAZB4704-82-50 07:07:00





             Test Item    Value        Reference Range Interpretation Comments

 

             WBC (test code = WBC) 6.9          3.7-10.4                  



[facility] StephenvilleSifwquqGCTVZUEYJT6207-42-12 07:07:00





             Test Item    Value        Reference Range Interpretation Comments

 

             RBC (test code = RBC) 5.30         4.70-6.10                 



[facility] StephenvilleYcumesuBGADONEOKP6456-09-20 07:07:00





             Test Item    Value        Reference Range Interpretation Comments

 

             MCHC (test code = MCHC) 32.8         32.0-36.0                 



[facility] StephenvilleTkwqvltGYXJONZSBH2791-16-80 07:07:00





             Test Item    Value        Reference Range Interpretation Comments

 

             MCH (test code = MCH) 27.9 pg      27.0-31.0                 



[facility] StephenvilleTomawhuOWUPDVFVDJ9706-85-19 07:07:00





             Test Item    Value        Reference Range Interpretation Comments

 

             Hgb (test code = Hgb) 14.8         14.0-18.0                 



[facility] StephenvilleXoqppmvAVUINYQKWV0884-77-57 07:07:00





             Test Item    Value        Reference Range Interpretation Comments

 

             MCV (test code = MCV) 85.0         80.0-94.0                 



[facility] StephenvilleAygjnpzPRZKNSPGJH4411-27-97 07:07:00





             Test Item    Value        Reference Range Interpretation Comments

 

             Hct (test code = Hct) 45.1         42.0-54.0                 



[facility]villeXfzpcgmEIDIPUZZZO5675-85-22 07:07:00





             Test Item    Value        Reference Range Interpretation Comments

 

             RDW (test code = RDW) 17.5         11.5-14.5                 



[facility] StephenvilleUgfaulaNQQVECSIGT8651-98-31 07:07:00





             Test Item    Value        Reference Range Interpretation Comments

 

             Eosinophils # (test code 0.2          See_Comment                [A

utomated message] The



             = Eosinophils #)                                        system whic

h generated



                                                                 this result tra

nsmitted



                                                                 reference range

: <=0.5.



                                                                 The reference r

zhen was



                                                                 not used to int

erpret



                                                                 this result as



                                                                 normal/abnormal

.



[facility] StephenvilleHhabqkpPZCCESDCNQ9493-48-85 07:07:00





             Test Item    Value        Reference Range Interpretation Comments

 

             Lymphocytes # (test code = Lymphocytes 2.0          1.0-5.5        

           



             #)                                                  



[facility] StephenvilleNocitqrBWUDCWZFRB8705-71-69 07:07:00





             Test Item    Value        Reference Range Interpretation Comments

 

             Monocytes # (test code 0.7          See_Comment                [Aut

omated message] The



             = Monocytes #)                                        system which 

generated



                                                                 this result tra

nsmitted



                                                                 reference range

: <=0.8.



                                                                 The reference r

zhen was



                                                                 not used to int

erpret



                                                                 this result as



                                                                 normal/abnormal

.



[facility] StephenvilleFrzqpqrZSLUEHHMPY9794-69-62 07:07:00





             Test Item    Value        Reference Range Interpretation Comments

 

             Eosinophils (test code = 2.4          See_Comment                [A

utomated message] The



             Eosinophils)                                        system which ge

nerated



                                                                 this result tra

nsmitted



                                                                 reference range

: <=4.0.



                                                                 The reference r

zhen was



                                                                 not used to int

erpret



                                                                 this result as



                                                                 normal/abnormal

.



[facility] StephenvilleGtmxwiqGHUAXNTVGH7929-61-11 07:07:00





             Test Item    Value        Reference Range Interpretation Comments

 

             Basophils (test code = 0.6          See_Comment                [Aut

omated message] The



             Basophils)                                          system which ge

nerated



                                                                 this result tra

nsmitted



                                                                 reference range

: <=1.0.



                                                                 The reference r

zhen was



                                                                 not used to int

erpret



                                                                 this result as



                                                                 normal/abnormal

.



[facility] StephenvilleCsfiaqfGYYXMLUYYF0893-48-65 07:07:00





             Test Item    Value        Reference Range Interpretation Comments

 

             Segs-Bands # (test code = Segs-Bands #) 4.0          1.5-8.1       

            



[facility] StephenvilleFeaetraOEOFMGAAFH7326-54-39 07:07:00





             Test Item    Value        Reference Range Interpretation Comments

 

             Monocytes (test code = Monocytes) 10.4         2.0-12.0            

      



[facility] StephenvilleCddakjiAFSEWMQMWP9024-05-52 07:07:00





             Test Item    Value        Reference Range Interpretation Comments

 

             RBC Morph (test code = Normal (18 2:07 AM)                     

      



             RBC Morph)                                          



[facility] StephenvilleXsffkxxHAHKIDRNCJ3056-92-47 07:07:00





             Test Item    Value        Reference Range Interpretation Comments

 

             Segs (test code = Segs) 58.1         45.0-75.0                 



[facility] StephenvilleYbnxbxcIQWCTUOGBZ4973-92-96 07:07:00





             Test Item    Value        Reference Range Interpretation Comments

 

             Plt Morph (test code = Normal (18 2:07 AM)                     

      



             Plt Morph)                                          



[facility] StephenvilleDntmxxqUJCTBZSZTG6783-57-21 07:07:00





             Test Item    Value        Reference Range Interpretation Comments

 

             Lymphocytes (test code = Lymphocytes) 28.5         20.0-40.0       

          



[facility] StephenvilleMyzccrrOQHSKHJNHD8492-35-78 16:07:00





             Test Item    Value        Reference Range Interpretation Comments

 

             Basophils # (test code 0.1          See_Comment                [Aut

omated message] The



             = Basophils #)                                        system which 

generated



                                                                 this result tra

nsmitted



                                                                 reference range

: <=0.2.



                                                                 The reference r

zhen was



                                                                 not used to int

erpret



                                                                 this result as



                                                                 normal/abnormal

.



[facility]iwepfjWDR[facility]OFERGQ2054[facility]-37 16:07:00





             Test Item    Value        Reference Range Interpretation Comments

 

             Polychrom (test code = Moderate *ABN*(18                       

    



             Polychrom)   11:07 AM)                              



[facility]villeFqqpuabUFYYKRZCBQ3267-60-12 16:07:00





             Test Item    Value        Reference Range Interpretation Comments

 

             Basophils # (test code 0.1          See_Comment                [Aut

omated message] The



             = Basophils #)                                        system which 

generated



                                                                 this result tra

nsmitted



                                                                 reference range

: <=0.2.



                                                                 The reference r

zhen was



                                                                 not used to int

erpret



                                                                 this result as



                                                                 normal/abnormal

.



[facility]villeAjbxrktGETMEEDWGJ3715-11-75 16:07:00





             Test Item    Value        Reference Range Interpretation Comments

 

             Polychrom (test code = Moderate *ABN*(18                       

    



             Polychrom)   11:07 AM)                              



[facility]villeIwroslzDVSVJMZCPN9238-87-98 16:07:00





             Test Item    Value        Reference Range Interpretation Comments

 

             Basophils # (test code 0.1          See_Comment                [Aut

omated message] The



             = Basophils #)                                        system which 

generated



                                                                 this result tra

nsmitted



                                                                 reference range

: <=0.2.



                                                                 The reference r

zhen was



                                                                 not used to int

erpret



                                                                 this result as



                                                                 normal/abnormal

.



[facility]villeOnxxwlrNNCUWVHYOK0996-48-09 16:07:00





             Test Item    Value        Reference Range Interpretation Comments

 

             Polychrom (test code = Moderate *ABN*(18                       

    



             Polychrom)   11:07 AM)                              



[facility]ld[facility]SAKEFK3195[facility]58-50 06:43:00





             Test Item    Value        Reference Range Interpretation Comments

 

             Vanco Tr TND (test code = Vanco Tr TND) *                          

            



[facility] BbbeilqSVBJVCSUUP7309-61-36 06:43:00





             Test Item    Value        Reference Range Interpretation Comments

 

             Vanco Tr (test code = Vanco Tr) 22.3                               

    



[facility] WqqfodjCFULTCPBPL7744-09-77 06:43:00





             Test Item    Value        Reference Range Interpretation Comments

 

             Vanco Tr TND (test code = Vanco Tr TND) *                          

            



[place] [id number] 06:43:00





             Test Item    Value        Reference Range Interpretation Comments

 

             Vanco Tr (test code = Vanco Tr) 22.3                               

    



[place] [id number] 06:43:00





             Test Item    Value        Reference Range Interpretation Comments

 

             Vanco Tr TND (test code = Vanco Tr TND) *                          

            



[place] [id number] 06:43:00





             Test Item    Value        Reference Range Interpretation Comments

 

             Vanco Tr (test code = Vanco Tr) 22.3                               

    



[facility]2018-05-04 11:45:00





             Test Item    Value        Reference Range Interpretation Comments

 

             Magnesium Lvl (test code = Magnesium 2.3          1.8-2.4          

         



             Lvl)                                                



[facility]2018-05-04 11:45:00





             Test Item    Value        Reference Range Interpretation Comments

 

             Phosphorus (test code = Phosphorus) 3.5          2.5-4.5           

        



[facility]villeEqbeeebPUVVCMJVBZ5505-16-12 11:45:00





             Test Item    Value        Reference Range Interpretation Comments

 

             PT (test code = PT) 14.0 s       12.0-14.7                 



[facility]villeQzjvrfdMCEXCUZHDA0793-77-00 11:45:00





             Test Item    Value        Reference Range Interpretation Comments

 

             PTT (test code = PTT) 37.6 s       22.9-35.8                 



[facility]villeLvnroqsRJUSMQTEIN4043-20-37 11:45:00





             Test Item    Value        Reference Range Interpretation Comments

 

             INR (test code = INR) 1.08 1       0.85-1.17                 



[facility] TylerHzylwppQKEUKVCNHK9073[facility]38-54 11:45:00





             Test Item    Value        Reference Range Interpretation Comments

 

             C-REACTIVE PROTEIN (test code = 13.1                               

    



             C-REACTIVE PROTEIN)                                        



[facility] TylerUlzibniNQAUHCGWES6867-74-70 11:45:00





             Test Item    Value        Reference Range Interpretation Comments

 

             Prealbumin (test code = Prealbumin) 25.2         18.0-45.0         

        



[facility]2018-05-04 11:45:00





             Test Item    Value        Reference Range Interpretation Comments

 

             Magnesium Lvl (test code = Magnesium 2.3          1.8-2.4          

         



             Lvl)                                                



[facility]2018-05-04 11:45:00





             Test Item    Value        Reference Range Interpretation Comments

 

             Phosphorus (test code = Phosphorus) 3.5          2.5-4.5           

        



[facility]villeJbtfzfuRKVBTRLSAS3910-04-09 11:45:00





             Test Item    Value        Reference Range Interpretation Comments

 

             PT (test code = PT) 14.0 s       12.0-14.7                 



[facility]villeOdmuqwqSIZKAWDXOX8121-64-54 11:45:00





             Test Item    Value        Reference Range Interpretation Comments

 

             PTT (test code = PTT) 37.6 s       22.9-35.8                 



[facility]villeQqgqrioZNBSZHOIBW9506-86-56 11:45:00





             Test Item    Value        Reference Range Interpretation Comments

 

             INR (test code = INR) 1.08 1       0.85-1.17                 



[facility] TylerJlqgzorVNJZCGSWXF9392-43-85 11:45:00





             Test Item    Value        Reference Range Interpretation Comments

 

             C-REACTIVE PROTEIN (test code = 13.1                               

    



             C-REACTIVE PROTEIN)                                        



[facility] TylerZlfglqyNIFOFCUXZR4032-66-02 11:45:00





             Test Item    Value        Reference Range Interpretation Comments

 

             Prealbumin (test code = Prealbumin) 25.2         18.0-45.0         

        



[facility]2018-05-04 11:45:00





             Test Item    Value        Reference Range Interpretation Comments

 

             Magnesium Lvl (test code = Magnesium 2.3          1.8-2.4          

         



             Lvl)                                                



[facility]2018-05-04 11:45:00





             Test Item    Value        Reference Range Interpretation Comments

 

             Phosphorus (test code = Phosphorus) 3.5          2.5-4.5           

        



[facility] StephenvilleMxmuqwaPHKZVPYKQJ1205-30-47 11:45:00





             Test Item    Value        Reference Range Interpretation Comments

 

             PT (test code = PT) 14.0 s       12.0-14.7                 



[facility] StephenvilleMedrzwdEAKVLVIMYL7284-48-80 11:45:00





             Test Item    Value        Reference Range Interpretation Comments

 

             PTT (test code = PTT) 37.6 s       22.9-35.8                 



[facility] StephenvilleVxougmlAHQMJNCMLH4104-49-87 11:45:00





             Test Item    Value        Reference Range Interpretation Comments

 

             INR (test code = INR) 1.08 1       0.85-1.17                 



[facility] TylerYhzopjdKLQYCDYANE1767-88-29 11:45:00





             Test Item    Value        Reference Range Interpretation Comments

 

             C-REACTIVE PROTEIN (test code = 13.1                               

    



             C-REACTIVE PROTEIN)                                        



[facility] TylerVblhaedLBCXLKIRXB0235-61-52 11:45:00





             Test Item    Value        Reference Range Interpretation Comments

 

             Prealbumin (test code = Prealbumin) 25.2         18.0-45.0         

        



[facility]2018-05-04 03:06:00





             Test Item    Value        Reference Range Interpretation Comments

 

             Lactic Acid Lvl (test code = Lactic 0.9          0.5-2.2           

        



             Acid Lvl)                                           



[facility]villeQqjrvkjLWUZZFFXZJ3881-25-76 03:06:00





             Test Item    Value        Reference Range Interpretation Comments

 

             Sed Rate (test code = 5            See_Comment                [Auto

mated message] The



             Sed Rate)                                           system which ge

nerated this



                                                                 result transmit

huma



                                                                 reference range

: <=15. The



                                                                 reference range

 was not



                                                                 used to interpr

et this



                                                                 result as zayda

l/abnormal.



[facility] TylerMwziyznPRSJCLVLEQ3344-27-99 03:06:00





             Test Item    Value        Reference Range Interpretation Comments

 

             C-REACTIVE PROTEIN (test code = 15.8                               

    



             C-REACTIVE PROTEIN)                                        



[facility]2018-05-04 03:06:00





             Test Item    Value        Reference Range Interpretation Comments

 

             Lactic Acid Lvl (test code = Lactic 0.9          0.5-2.2           

        



             Acid Lvl)                                           



[facility]villeXlxwbwfUQKPAKWDBB2494-56-86 03:06:00





             Test Item    Value        Reference Range Interpretation Comments

 

             Sed Rate (test code = 5            See_Comment                [Auto

mated message] The



             Sed Rate)                                           system which ge

nerated this



                                                                 result transmit

huma



                                                                 reference range

: <=15. The



                                                                 reference range

 was not



                                                                 used to interpr

et this



                                                                 result as zayda

l/abnormal.



[facility] TylerAkliimlKFORUYTFGJ9735-53-03 03:06:00





             Test Item    Value        Reference Range Interpretation Comments

 

             C-REACTIVE PROTEIN (test code = 15.8                               

    



             C-REACTIVE PROTEIN)                                        



[facility]2018-05-04 03:06:00





             Test Item    Value        Reference Range Interpretation Comments

 

             Lactic Acid Lvl (test code = Lactic 0.9          0.5-2.2           

        



             Acid Lvl)                                           



[facility]villeAwndqnpJCYTAZMASI3688-74-30 03:06:00





             Test Item    Value        Reference Range Interpretation Comments

 

             Sed Rate (test code = 5            See_Comment                [Auto

mated message] The



             Sed Rate)                                           system which ge

nerated this



                                                                 result transmit

huma



                                                                 reference range

: <=15. The



                                                                 reference range

 was not



                                                                 used to interpr

et this



                                                                 result as zayda

l/abnormal.



[facility] TylerWbpyrgkBRZLYQMUQT5592-01-05 03:06:00





             Test Item    Value        Reference Range Interpretation Comments

 

             C-REACTIVE PROTEIN (test code = 15.8                               

    



             C-REACTIVE PROTEIN)                                        



[facility] StephenvilleNbsoxgrWHLJAXDOTH9980-46-35 00:44:00





             Test Item    Value        Reference Range Interpretation Comments

 

             PT (test code = PT) 13.0 s       12.0-14.7                 



[facility] StephenvilleZfxdzatEFTBYQFVHU0064-71-51 00:44:00





             Test Item    Value        Reference Range Interpretation Comments

 

             INR (test code = INR) 0.98 1       0.85-1.17                 



[facility] StephenvilleErynebgQTJRQIWNMP1968-71-65 00:44:00





             Test Item    Value        Reference Range Interpretation Comments

 

             PTT (test code = PTT) 33.2 s       22.9-35.8                 



[facility] StephenvilleOoyobxoYMFKXIRVRN6403-70-19 00:44:00





             Test Item    Value        Reference Range Interpretation Comments

 

             PT (test code = PT) 13.0 s       12.0-14.7                 



[facility] StephenvilleZlhuzlaQFSCGBVFZL6414-69-13 00:44:00





             Test Item    Value        Reference Range Interpretation Comments

 

             INR (test code = INR) 0.98 1       0.85-1.17                 



[facility] StephenvilleIwjbbuxPBMEEZWNJQ5044-33-43 00:44:00





             Test Item    Value        Reference Range Interpretation Comments

 

             PTT (test code = PTT) 33.2 s       22.9-35.8                 



[facility] StephenvilleHhvjekiEUJSDOFSKQ3916-08-29 00:44:00





             Test Item    Value        Reference Range Interpretation Comments

 

             PT (test code = PT) 13.0 s       12.0-14.7                 



[facility] StephenvilleLcouadlWJWVLLBFVT9623-47-63 00:44:00





             Test Item    Value        Reference Range Interpretation Comments

 

             INR (test code = INR) 0.98 1       0.85-1.17                 



[facility] StephenvilleObsqimqFDEBMSDDKG5402-11-21 00:44:00





             Test Item    Value        Reference Range Interpretation Comments

 

             PTT (test code = PTT) 33.2 s       22.9-35.8                 



[facility] [employer] [id number] 23:51:00





             Test Item    Value        Reference Range Interpretation Comments

 

             Antibody Scrn (test Negative (5/3/18 6:51                          

 



             code = Antibody Scrn) PM)                                    



[facility] [employer] [id number] 23:51:00





             Test Item    Value        Reference Range Interpretation Comments

 

             ABO/Rh (test code = ABO/Rh) AB POS                                 



[facility] [employer] [id number] 23:51:00





             Test Item    Value        Reference Range Interpretation Comments

 

             Antibody Scrn (test Negative (5/3/18 6:51                          

 



             code = Antibody Scrn) PM)                                    



[facility]  23:51:00





             Test Item    Value        Reference Range Interpretation Comments

 

             ABO/Rh (test code = ABO/Rh) AB POS                                 



[facility] [id number] 23:51:00





             Test Item    Value        Reference Range Interpretation Comments

 

             Antibody Scrn (test Negative (5/3/18 6:51                          

 



             code = Antibody Scrn) PM)                                    



[facility] [id number] 23:51:00





             Test Item    Value        Reference Range Interpretation Comments

 

             ABO/Rh (test code = ABO/Rh) AB POS                                 



[facility] AND [id number] 23:35:00





             Test Item    Value        Reference Range Interpretation Comments

 

             UA Urobilinogen (test code = UA 0.2          0.1-1.0               

    



             Urobilinogen)                                        



[facility] AND [id number] 23:35:00





             Test Item    Value        Reference Range Interpretation Comments

 

             UA Nitrite (test code Negative (5/3/18 6:35                        

   



             = UA Nitrite) PM)                                    



[facility] AND [id number] 23:35:00





             Test Item    Value        Reference Range Interpretation Comments

 

             UA Glucose (test code Negative (5/3/18 6:35                        

   



             = UA Glucose) PM)                                    



[facility] AND [id number] 23:35:00





             Test Item    Value        Reference Range Interpretation Comments

 

             UA Ketones (test code Negative *NA*(5/3/18                         

  



             = UA Ketones) 6:35 PM)                               



[facility] AND [id number] 23:35:00





             Test Item    Value        Reference Range Interpretation Comments

 

             UA Bili (test code = Negative *NA*(5/3/18                          

 



             UA Bili)     6:35 PM)                               



[facility] AND [id number] 23:35:00





             Test Item    Value        Reference Range Interpretation Comments

 

             UA Blood (test code = Trace *ABN*(5/3/18                           



             UA Blood)    6:35 PM)                               



[facility] AND [id number] 23:35:00





             Test Item    Value        Reference Range Interpretation Comments

 

             UA Leuk Est (test code Small *ABN*(5/3/18 6:35                     

      



             = UA Leuk Est) PM)                                    



[facility] AND [id number] 23:35:00





             Test Item    Value        Reference Range Interpretation Comments

 

             UA Spec Grav (test code = UA Spec 1.020 1                          

      



             Grav)                                               



[facility] AND [id number] 23:35:00





             Test Item    Value        Reference Range Interpretation Comments

 

             UA pH (test code = UA pH) 6.0 1        5.0-8.0                   



[facility] [id number] 23:35:00





             Test Item    Value        Reference Range Interpretation Comments

 

             UA Color (test code = Yellow *NA*(5/3/18 6:35                      

     



             UA Color)    PM)                                    



[facility] AND [id number] 23:35:00





             Test Item    Value        Reference Range Interpretation Comments

 

             UA Protein (test code = Trace *ABN*(5/3/18                         

  



             UA Protein)  6:35 PM)                               



[facility] AND [id number] 23:35:00





             Test Item    Value        Reference Range Interpretation Comments

 

             UA Turbidity (test code Slight Cloudy (5/3/18                      

     



             = UA Turbidity) 6:35 PM)                               



[facility] AND [id number] 23:35:00





             Test Item    Value        Reference Range Interpretation Comments

 

             UA Hyal Cast 0-2 (5/3/18 6:35 See_Comment                [Automated

 message]



             (test code = UA PM)                                    The system w

[facility]



             Hyal Cast)                                          generated this 

result



                                                                 transmitted ref

erence



                                                                 range: <=2. The



                                                                 reference range

 was



                                                                 not used to int

erpret



                                                                 this result as



                                                                 normal/abnormal

.



[facility] AND [id number] 23:35:00





             Test Item    Value        Reference Range Interpretation Comments

 

             UA Bacteria (test code = UA Occasional /HPF                        

   



             Bacteria)                                           



[facility] AND [id number] 23:35:00





             Test Item    Value        Reference Range Interpretation Comments

 

             UA RBC (test code 11-20 /HPF   See_Comment                [Automate

d message] The



             = UA RBC)                                           system which ge

nerated



                                                                 this result tra

nsmitted



                                                                 reference range

: <=2. The



                                                                 reference range

 was not



                                                                 used to interpr

et this



                                                                 result as



                                                                 normal/abnormal

.



[facility] [id number] 23:35:00





             Test Item    Value        Reference Range Interpretation Comments

 

             UA Sq Epi (test code = UA Sq Occasional /LPF                       

    



             Epi)                                                



[facility] AND [id number] 23:35:00





             Test Item    Value        Reference Range Interpretation Comments

 

             UA WBC (test code = UA WBC)  /HPF                            



[facility] AND [id number] 23:35:00





             Test Item    Value        Reference Range Interpretation Comments

 

             UA Urobilinogen (test code = UA 0.2          0.1-1.0               

    



             Urobilinogen)                                        



[facility]  23:35:00





             Test Item    Value        Reference Range Interpretation Comments

 

             UA Nitrite (test code Negative (5/3/18 6:35                        

   



             = UA Nitrite) PM)                                    



[facility] AND  23:35:00





             Test Item    Value        Reference Range Interpretation Comments

 

             UA Glucose (test code Negative (5/3/18 6:35                        

   



             = UA Glucose) PM)                                    



[facility] AND  23:35:00





             Test Item    Value        Reference Range Interpretation Comments

 

             UA Ketones (test code Negative *NA*(5/3/18                         

  



             = UA Ketones) 6:35 PM)                               



[facility] AND  23:35:00





             Test Item    Value        Reference Range Interpretation Comments

 

             UA Bili (test code = Negative *NA*(5/3/18                          

 



             UA Bili)     6:35 PM)                               



[facility] AND  23:35:00





             Test Item    Value        Reference Range Interpretation Comments

 

             UA Blood (test code = Trace *ABN*(5/3/18                           



             UA Blood)    6:35 PM)                               



[facility] AND [id number] 23:35:00





             Test Item    Value        Reference Range Interpretation Comments

 

             UA Leuk Est (test code Small *ABN*(5/3/18 6:35                     

      



             = UA Leuk Est) PM)                                    



[facility] AND [id number] 23:35:00





             Test Item    Value        Reference Range Interpretation Comments

 

             UA Spec Grav (test code = UA Spec 1.020 1                          

      



             Grav)                                               



[facility] AND [id number] 23:35:00





             Test Item    Value        Reference Range Interpretation Comments

 

             UA pH (test code = UA pH) 6.0 1        5.0-8.0                   



[facility] AND  23:35:00





             Test Item    Value        Reference Range Interpretation Comments

 

             UA Color (test code = Yellow *NA*(5/3/18 6:35                      

     



             UA Color)    PM)                                    



[facility] AND [id number] 23:35:00





             Test Item    Value        Reference Range Interpretation Comments

 

             UA Protein (test code = Trace *ABN*(5/3/18                         

  



             UA Protein)  6:35 PM)                               



[facility] AND [id number] 23:35:00





             Test Item    Value        Reference Range Interpretation Comments

 

             UA Turbidity (test code Slight Cloudy (5/3/18                      

     



             = UA Turbidity) 6:35 PM)                               



[facility] AND [id number] 23:35:00





             Test Item    Value        Reference Range Interpretation Comments

 

             UA Hyal Cast 0-2 (5/3/18 6:35 See_Comment                [Automated

 message]



             (test code = UA PM)                                    The system w

[facility]



             Hyal Cast)                                          generated this 

result



                                                                 transmitted ref

erence



                                                                 range: <=2. The



                                                                 reference range

 was



                                                                 not used to int

erpret



                                                                 this result as



                                                                 normal/abnormal

.



[facility] AND [id number] 23:35:00





             Test Item    Value        Reference Range Interpretation Comments

 

             UA Bacteria (test code = UA Occasional /HPF                        

   



             Bacteria)                                           



[facility] AND [id number] 23:35:00





             Test Item    Value        Reference Range Interpretation Comments

 

             UA RBC (test code 11-20 /HPF   See_Comment                [Automate

d message] The



             = UA RBC)                                           system which ge

nerated



                                                                 this result tra

nsmitted



                                                                 reference range

: <=2. The



                                                                 reference range

 was not



                                                                 used to interpr

et this



                                                                 result as



                                                                 normal/abnormal

.



[facility] AND [id number] 23:35:00





             Test Item    Value        Reference Range Interpretation Comments

 

             UA Sq Epi (test code = UA Sq Occasional /LPF                       

    



             Epi)                                                



[facility] AND [id number] 23:35:00





             Test Item    Value        Reference Range Interpretation Comments

 

             UA WBC (test code = UA WBC)  /HPF                            



[facility] AND [id number] 23:35:00





             Test Item    Value        Reference Range Interpretation Comments

 

             UA Urobilinogen (test code = UA 0.2          0.1-1.0               

    



             Urobilinogen)                                        



[facility] AND [id number] 23:35:00





             Test Item    Value        Reference Range Interpretation Comments

 

             UA Nitrite (test code Negative (5/3/18 6:35                        

   



             = UA Nitrite) PM)                                    



[facility] AND [id number] 23:35:00





             Test Item    Value        Reference Range Interpretation Comments

 

             UA Glucose (test code Negative (5/3/18 6:35                        

   



             = UA Glucose) PM)                                    



[facility] AND [id number] 23:35:00





             Test Item    Value        Reference Range Interpretation Comments

 

             UA Ketones (test code Negative *NA*(5/3/18                         

  



             = UA Ketones) 6:35 PM)                               



[facility] AND [id number] 23:35:00





             Test Item    Value        Reference Range Interpretation Comments

 

             UA Bili (test code = Negative *NA*(5/3/18                          

 



             UA Bili)     6:35 PM)                               



[facility] AND [id number] 23:35:00





             Test Item    Value        Reference Range Interpretation Comments

 

             UA Blood (test code = Trace *ABN*(5/3/18                           



             UA Blood)    6:35 PM)                               



[facility] AND [id number] 23:35:00





             Test Item    Value        Reference Range Interpretation Comments

 

             UA Leuk Est (test code Small *ABN*(5/3/18 6:35                     

      



             = UA Leuk Est) PM)                                    



[facility] AND [id number] 23:35:00





             Test Item    Value        Reference Range Interpretation Comments

 

             UA Spec Grav (test code = UA Spec 1.020 1                          

      



             Grav)                                               



[facility] AND [id number] 23:35:00





             Test Item    Value        Reference Range Interpretation Comments

 

             UA pH (test code = UA pH) 6.0 1        5.0-8.0                   



[facility] AND [id number] 23:35:00





             Test Item    Value        Reference Range Interpretation Comments

 

             UA Color (test code = Yellow *NA*(5/3/18 6:35                      

     



             UA Color)    PM)                                    



[facility] AND [id number] 23:35:00





             Test Item    Value        Reference Range Interpretation Comments

 

             UA Protein (test code = Trace *ABN*(5/3/18                         

  



             UA Protein)  6:35 PM)                               



[facility] AND [id number] 23:35:00





             Test Item    Value        Reference Range Interpretation Comments

 

             UA Turbidity (test code Slight Cloudy (5/3/18                      

     



             = UA Turbidity) 6:35 PM)                               



[facility] AND [id number] 23:35:00





             Test Item    Value        Reference Range Interpretation Comments

 

             UA Hyal Cast 0-2 (5/3/18 6:35 See_Comment                [Automated

 message]



             (test code = UA PM)                                    The system w

[facility]



             Hyal Cast)                                          generated this 

result



                                                                 transmitted ref

erence



                                                                 range: <=2. The



                                                                 reference range

 was



                                                                 not used to int

erpret



                                                                 this result as



                                                                 normal/abnormal

.



[facility] AND [id number] 23:35:00





             Test Item    Value        Reference Range Interpretation Comments

 

             UA Bacteria (test code = UA Occasional /HPF                        

   



             Bacteria)                                           



[facility] AND [id number] 23:35:00





             Test Item    Value        Reference Range Interpretation Comments

 

             UA RBC (test code 11-20 /HPF   See_Comment                [Automate

d message] The



             = UA RBC)                                           system which ge

nerated



                                                                 this result tra

nsmitted



                                                                 reference range

: <=2. The



                                                                 reference range

 was not



                                                                 used to interpr

et this



                                                                 result as



                                                                 normal/abnormal

.



[facility] AND [id number] 23:35:00





             Test Item    Value        Reference Range Interpretation Comments

 

             UA Sq Epi (test code = UA Sq Occasional /LPF                       

    



             Epi)                                                



[facility] AND [id number] 23:35:00





             Test Item    Value        Reference Range Interpretation Comments

 

             UA WBC (test code = UA WBC)  /HPF                            



[id number]:00





             Test Item    Value        Reference Range Interpretation Comments

 

             Basophils # (test code 0.1          See_Comment                [Aut

omated message] The



             = Basophils #)                                        system which 

generated



                                                                 this result tra

nsmitted



                                                                 reference range

: <=0.2.



                                                                 The reference r

zhen was



                                                                 not used to int

erpret



                                                                 this result as



                                                                 normal/abnormal

.



[id number] 23:20:00





             Test Item    Value        Reference Range Interpretation Comments

 

             Polychrom (test code = Polychrom) Slight                           

      



[id number] 23:20:00





             Test Item    Value        Reference Range Interpretation Comments

 

             Plt Morph (test code = Normal (5/3/18 6:20 PM)                     

      



             Plt Morph)                                          



[id number] 23:20:00





             Test Item    Value        Reference Range Interpretation Comments

 

             Basophils # (test code 0.1          See_Comment                [Aut

omated message] The



             = Basophils #)                                        system which 

generated



                                                                 this result tra

nsmitted



                                                                 reference range

: <=0.2.



                                                                 The reference r

zhen was



                                                                 not used to int

erpret



                                                                 this result as



                                                                 normal/abnormal

.



[id number] 23:20:00





             Test Item    Value        Reference Range Interpretation Comments

 

             Polychrom (test code = Polychrom) Slight                           

      



[id number] 23:20:00





             Test Item    Value        Reference Range Interpretation Comments

 

             Plt Morph (test code = Normal (5/3/18 6:20 PM)                     

      



             Plt Morph)                                          



[id number] 23:20:00





             Test Item    Value        Reference Range Interpretation Comments

 

             Basophils # (test code 0.1          See_Comment                [Aut

omated message] The



             = Basophils #)                                        system which 

generated



                                                                 this result tra

nsmitted



                                                                 reference range

: <=0.2.



                                                                 The reference r

zhen was



                                                                 not used to int

erpret



                                                                 this result as



                                                                 normal/abnormal

.



[id number] 23:20:00





             Test Item    Value        Reference Range Interpretation Comments

 

             Polychrom (test code = Polychrom) Slight                           

      



[id number] 23:20:00





             Test Item    Value        Reference Range Interpretation Comments

 

             Plt Morph (test code = Normal (5/3/18 6:20 PM)                     

      



             Plt Morph)                                          



[facility] [id number] 16:22:00





             Test Item    Value        Reference Range Interpretation Comments

 

             CULTURE (BEAKER) (test No growth in 5 days                         

  



             code = 1095)                                        



BLOOD [id number] 16:22:00





             Test Item    Value        Reference Range Interpretation Comments

 

             CULTURE (BEAKER) (test No growth in 5 days                         

  



             code = 1095)                                        



(MANUAL DIFFERENTIAL)2017 22:29:00





             Test Item    Value        Reference Range Interpretation Comments

 

             TOTAL COUNTED (BEAKER) (test code =                                

        



             1351)                                               

 

             WBC MORPHOLOGY (BEAKER) (test code = Normal                        

         



             487)                                                

 

             PLT MORPHOLOGY (BEAKER) (test code = Normal                        

         



             486)                                                

 

             RBC MORPHOLOGY (BEAKER) (test code = Normal                        

         



             762)                                                



CBC W/PLT COUNT &amp; AUTO [id number] 22:28:00





             Test Item    Value        Reference Range Interpretation Comments

 

             WHITE BLOOD CELL COUNT (BEAKER) 7.3 K/ L     4.0-10.0              

    



             (test code = 775)                                        

 

             RED BLOOD CELL COUNT (BEAKER) 5.09 M/ L    4.20-5.80               

  



             (test code = 761)                                        

 

             HEMOGLOBIN (BEAKER) (test code = 13.0 GM/DL   13.0-16.8            

     



             410)                                                

 

             HEMATOCRIT (BEAKER) (test code = 42.3 %       40.0-50.0            

     



             411)                                                

 

             MEAN CORPUSCULAR VOLUME (BEAKER) 83.0 fL      82.0-98.0            

     



             (test code = 753)                                        

 

             MEAN CORPUSCULAR HEMOGLOBIN 25.6 pg      27.0-33.0    L            



             (BEAKER) (test code = 751)                                        

 

             MEAN CORPUSCULAR HEMOGLOBIN CONC 30.8 GM/DL   32.0-36.0    L       

     



             (BEAKER) (test code = 752)                                        

 

             RED CELL DISTRIBUTION WIDTH 18.0 %       10.3-14.2    H            



             (BEAKER) (test code = 412)                                        

 

             PLATELET COUNT (BEAKER) (test 331 K/CU MM  150-430                 

  



             code = 756)                                         

 

             MEAN PLATELET VOLUME (BEAKER) 8.5 fL       6.5-10.5                

  



             (test code = 754)                                        

 

             NUCLEATED RED BLOOD CELLS 0 /100 WBC   0-0                       



             (BEAKER) (test code = 413)                                        

 

             NEUTROPHILS RELATIVE PERCENT 65 %                                  

 



             (BEAKER) (test code = 429)                                        

 

             LYMPHOCYTES RELATIVE PERCENT 23 %                                  

 



             (BEAKER) (test code = 430)                                        

 

             MONOCYTES RELATIVE PERCENT 8 %                                    



             (BEAKER) (test code = 431)                                        

 

             EOSINOPHILS RELATIVE PERCENT 3 %                                   

 



             (BEAKER) (test code = 432)                                        

 

             BASOPHILS RELATIVE PERCENT 1 %                                    



             (BEAKER) (test code = 437)                                        

 

             NEUTROPHILS ABSOLUTE COUNT 4.75 K/ L    1.80-8.00                 



             (BEAKER) (test code = 670)                                        

 

             LYMPHOCYTES ABSOLUTE COUNT 1.68 K/ L    1.48-4.50                 



             (BEAKER) (test code = 414)                                        

 

             MONOCYTES ABSOLUTE COUNT (BEAKER) 0.55 K/ L    0.00-1.30           

      



             (test code = 415)                                        

 

             EOSINOPHILS ABSOLUTE COUNT 0.24 K/ L    0.00-0.50                 



             (BEAKER) (test code = 416)                                        

 

             BASOPHILS ABSOLUTE COUNT (BEAKER) 0.05 K/ L    0.00-0.20           

      



             (test code = 417)                                        



[id number] METABOLIC [id number] 11:11:00





             Test Item    Value        Reference Range Interpretation Comments

 

             SODIUM (BEAKER) 138 meq/L    136-145                   



             (test code = 381)                                        

 

             POTASSIUM (BEAKER) 4.0 meq/L    3.5-5.1                   



             (test code = 379)                                        

 

             CHLORIDE (BEAKER) 108 meq/L           H            



             (test code = 382)                                        

 

             CO2 (BEAKER) (test 23 meq/L     22-29                     



             code = 355)                                         

 

             BLOOD UREA NITROGEN 11 mg/dL     7-21                      



             (BEAKER) (test code                                        



             = 354)                                              

 

             CREATININE (BEAKER) 0.74 mg/dL   0.57-1.25                 



             (test code = 358)                                        

 

             GLUCOSE RANDOM 124 mg/dL           H            



             (BEAKER) (test code                                        



             = 652)                                              

 

             CALCIUM (BEAKER) 8.9 mg/dL    8.4-10.2                  



             (test code = 697)                                        

 

             EGFR (BEAKER) (test 150 mL/min/1.73                           ESTIM

ATED GFR IS



             code = 1092) sq m                                   NOT AS ACCURATE

 AS



                                                                 CREATININE



                                                                 CLEARANCE IN



                                                                 PREDICTING



                                                                 GLOMERULAR



                                                                 FILTRATION RATE

.



                                                                 ESTIMATED GFR I

S



                                                                 NOT APPLICABLE 

FOR



                                                                 DIALYSIS PATIEN

TS.



URINE [id number] 09:56:00





             Test Item    Value        Reference Range Interpretation Comments

 

             CULTURE (BEAKER) (test <10,000 col/mL skin                         

  



             code = 1095) jaime                                  



COMPREHENSIVE METABOLIC [id number] 08:49:00





             Test Item    Value        Reference Range Interpretation Comments

 

             TOTAL PROTEIN 7.3 gm/dL    6.0-8.3                   



             (BEAKER) (test code =                                        



             770)                                                

 

             ALBUMIN (BEAKER) 3.3 g/dL     3.5-5.0      L            



             (test code = 1145)                                        

 

             ALKALINE PHOSPHATASE 89 U/L                           



             (BEAKER) (test code =                                        



             346)                                                

 

             BILIRUBIN TOTAL 1.4 mg/dL    0.2-1.2      H            



             (BEAKER) (test code =                                        



             377)                                                

 

             SODIUM (BEAKER) (test 137 meq/L    136-145                   



             code = 381)                                         

 

             POTASSIUM (BEAKER) 3.7 meq/L    3.5-5.1                   



             (test code = 379)                                        

 

             CHLORIDE (BEAKER) 108 meq/L           H            



             (test code = 382)                                        

 

             CO2 (BEAKER) (test 17 meq/L     22-29        L            



             code = 355)                                         

 

             BLOOD UREA NITROGEN 12 mg/dL     7-21                      



             (BEAKER) (test code =                                        



             354)                                                

 

             CREATININE (BEAKER) 0.78 mg/dL   0.57-1.25                 



             (test code = 358)                                        

 

             GLUCOSE RANDOM 119 mg/dL           H            



             (BEAKER) (test code =                                        



             652)                                                

 

             CALCIUM (BEAKER) 8.6 mg/dL    8.4-10.2                  



             (test code = 697)                                        

 

             AST (SGOT) (BEAKER) 13 U/L       5-34                      



             (test code = 353)                                        

 

             ALT (SGPT) (BEAKER) 28 U/L       6-55                      



             (test code = 347)                                        

 

             EGFR (BEAKER) (test 141                                    ESTIMATE

D GFR IS



             code = 1092) mL/min/1.73 sq                           NOT AS ACCURA

TE AS



                          m                                      CREATININE



                                                                 CLEARANCE IN



                                                                 PREDICTING



                                                                 GLOMERULAR



                                                                 FILTRATION RATE

.



                                                                 ESTIMATED GFR I

S



                                                                 NOT APPLICABLE 

FOR



                                                                 DIALYSIS PATIEN

TS.



CBC W/PLT COUNT &amp; AUTO [id number] 08:46:00





             Test Item    Value        Reference Range Interpretation Comments

 

             WHITE BLOOD CELL COUNT (BEAKER) 9.4 K/ L     4.0-10.0              

    



             (test code = 775)                                        

 

             RED BLOOD CELL COUNT (BEAKER) 4.79 M/ L    4.20-5.80               

  



             (test code = 761)                                        

 

             HEMOGLOBIN (BEAKER) (test code = 12.8 GM/DL   13.0-16.8    L       

     



             410)                                                

 

             HEMATOCRIT (BEAKER) (test code = 40.0 %       40.0-50.0            

     



             411)                                                

 

             MEAN CORPUSCULAR VOLUME (BEAKER) 83.4 fL      82.0-98.0            

     



             (test code = 753)                                        

 

             MEAN CORPUSCULAR HEMOGLOBIN 26.7 pg      27.0-33.0    L            



             (BEAKER) (test code = 751)                                        

 

             MEAN CORPUSCULAR HEMOGLOBIN CONC 32.0 GM/DL   32.0-36.0            

     



             (BEAKER) (test code = 752)                                        

 

             RED CELL DISTRIBUTION WIDTH 18.2 %       10.3-14.2    H            



             (BEAKER) (test code = 412)                                        

 

             PLATELET COUNT (BEAKER) (test 313 K/CU MM  150-430                 

  



             code = 756)                                         

 

             MEAN PLATELET VOLUME (BEAKER) 8.6 fL       6.5-10.5                

  



             (test code = 754)                                        

 

             NUCLEATED RED BLOOD CELLS 0 /100 WBC   0-0                       



             (BEAKER) (test code = 413)                                        

 

             NEUTROPHILS RELATIVE PERCENT 70 %                                  

 



             (BEAKER) (test code = 429)                                        

 

             LYMPHOCYTES RELATIVE PERCENT 16 %                                  

 



             (BEAKER) (test code = 430)                                        

 

             MONOCYTES RELATIVE PERCENT 12 %                                   



             (BEAKER) (test code = 431)                                        

 

             EOSINOPHILS RELATIVE PERCENT 1 %                                   

 



             (BEAKER) (test code = 432)                                        

 

             BASOPHILS RELATIVE PERCENT 1 %                                    



             (BEAKER) (test code = 437)                                        

 

             NEUTROPHILS ABSOLUTE COUNT 6.54 K/ L    1.80-8.00                 



             (BEAKER) (test code = 670)                                        

 

             LYMPHOCYTES ABSOLUTE COUNT 1.50 K/ L    1.48-4.50                 



             (BEAKER) (test code = 414)                                        

 

             MONOCYTES ABSOLUTE COUNT (BEAKER) 1.13 K/ L    0.00-1.30           

      



             (test code = 415)                                        

 

             EOSINOPHILS ABSOLUTE COUNT 0.13 K/ L    0.00-0.50                 



             (BEAKER) (test code = 416)                                        

 

             BASOPHILS ABSOLUTE COUNT (BEAKER) 0.06 K/ L    0.00-0.20           

      



             (test code = 417)                                        



0.00URINALYSIS W/ [id number] 20:36:00





             Test Item    Value        Reference Range Interpretation Comments

 

             COLOR (BEAKER) (test code = 470) Yellow                            

     

 

             CLARITY (BEAKER) (test code = 469) Hazy                            

       

 

             SPECIFIC GRAVITY UA (BEAKER) (test 1.012        1.001-1.035        

       



             code = 468)                                         

 

             PH UA (BEAKER) (test code = 467) 5.5          5.0-8.0              

     

 

             PROTEIN UA (BEAKER) (test code = 100 mg/dL    Negative     A       

     



             464)                                                

 

             GLUCOSE UA (BEAKER) (test code = Negative     Negative             

     



             365)                                                

 

             KETONES UA (BEAKER) (test code = Negative     Negative             

     



             371)                                                

 

             BILIRUBIN UA (BEAKER) (test code = Negative     Negative           

       



             462)                                                

 

             BLOOD UA (BEAKER) (test code = 461) Moderate     Negative     A    

        

 

             NITRITE UA (BEAKER) (test code = Negative     Negative             

     



             465)                                                

 

             LEUKOCYTE ESTERASE UA (BEAKER) Large        Negative     A         

   



             (test code = 466)                                        

 

             UROBILINOGEN UA (BEAKER) (test code 3.0 mg/dL    0.2-1.0      H    

        



             = 463)                                              

 

             RBC UA (BEAKER) (test code = 519) 19 /HPF                          

      

 

             WBC UA (BEAKER) (test code = 520) 182 /HPF                         

      

 

             SOURCE(BEAKER) (test code = 1195)                                  

      



BASIC METABOLIC [id number] 17:00:00





             Test Item    Value        Reference Range Interpretation Comments

 

             SODIUM (BEAKER) 140 meq/L    136-145                   



             (test code = 381)                                        

 

             POTASSIUM (BEAKER) 3.7 meq/L    3.5-5.1                   



             (test code = 379)                                        

 

             CHLORIDE (BEAKER) 112 meq/L           H            



             (test code = 382)                                        

 

             CO2 (BEAKER) (test 17 meq/L     22-29        L            



             code = 355)                                         

 

             BLOOD UREA NITROGEN 23 mg/dL     7-21         H            



             (BEAKER) (test code                                        



             = 354)                                              

 

             CREATININE (BEAKER) 1.00 mg/dL   0.57-1.25                 



             (test code = 358)                                        

 

             GLUCOSE RANDOM 102 mg/dL                        



             (BEAKER) (test code                                        



             = 652)                                              

 

             CALCIUM (BEAKER) 8.7 mg/dL    8.4-10.2                  



             (test code = 697)                                        

 

             EGFR (BEAKER) (test 106 mL/min/1.73                           ESTIM

ATED GFR IS



             code = 1092) sq m                                   NOT AS ACCURATE

 AS



                                                                 CREATININE



                                                                 CLEARANCE IN



                                                                 PREDICTING



                                                                 GLOMERULAR



                                                                 FILTRATION RATE

.



                                                                 ESTIMATED GFR I

S



                                                                 NOT APPLICABLE 

FOR



                                                                 DIALYSIS PATIEN

TS.



Specimen slightly ictericCBC W/PLT COUNT &amp; AUTO [id number] 
12:18:00





             Test Item    Value        Reference Range Interpretation Comments

 

             WHITE BLOOD CELL COUNT (BEAKER) 16.4 K/ L    4.0-10.0     H        

    



             (test code = 775)                                        

 

             RED BLOOD CELL COUNT (BEAKER) 5.22 M/ L    4.20-5.80               

  



             (test code = 761)                                        

 

             HEMOGLOBIN (BEAKER) (test code = 14.4 GM/DL   13.0-16.8            

     



             410)                                                

 

             HEMATOCRIT (BEAKER) (test code = 42.9 %       40.0-50.0            

     



             411)                                                

 

             MEAN CORPUSCULAR VOLUME (BEAKER) 82.2 fL      82.0-98.0            

     



             (test code = 753)                                        

 

             MEAN CORPUSCULAR HEMOGLOBIN 27.5 pg      27.0-33.0                 



             (BEAKER) (test code = 751)                                        

 

             MEAN CORPUSCULAR HEMOGLOBIN CONC 33.5 GM/DL   32.0-36.0            

     



             (BEAKER) (test code = 752)                                        

 

             RED CELL DISTRIBUTION WIDTH 16.2 %       10.3-14.2    H            



             (BEAKER) (test code = 412)                                        

 

             PLATELET COUNT (BEAKER) (test 329 K/CU MM  150-430                 

  



             code = 756)                                         

 

             MEAN PLATELET VOLUME (BEAKER) 8.2 fL       6.5-10.5                

  



             (test code = 754)                                        

 

             NUCLEATED RED BLOOD CELLS 0 /100 WBC   0-0                       



             (BEAKER) (test code = 413)                                        

 

             NEUTROPHILS RELATIVE PERCENT 83 %                                  

 



             (BEAKER) (test code = 429)                                        

 

             LYMPHOCYTES RELATIVE PERCENT 7 %                                   

 



             (BEAKER) (test code = 430)                                        

 

             MONOCYTES RELATIVE PERCENT 9 %                                    



             (BEAKER) (test code = 431)                                        

 

             EOSINOPHILS RELATIVE PERCENT 0 %                                   

 



             (BEAKER) (test code = 432)                                        

 

             BASOPHILS RELATIVE PERCENT 0 %                                    



             (BEAKER) (test code = 437)                                        

 

             NEUTROPHILS ABSOLUTE COUNT 1.62 K/ L    1.80-8.00    L            



             (BEAKER) (test code = 670)                                        

 

             LYMPHOCYTES ABSOLUTE COUNT 1.15 K/ L    1.48-4.50    L            



             (BEAKER) (test code = 414)                                        

 

             MONOCYTES ABSOLUTE COUNT (BEAKER) 1.56 K/ L    0.00-1.30    H      

      



             (test code = 415)                                        

 

             EOSINOPHILS ABSOLUTE COUNT 0.01 K/ L    0.00-0.50                 



             (BEAKER) (test code = 416)                                        

 

             BASOPHILS ABSOLUTE COUNT (BEAKER) 0.02 K/ L    0.00-0.20           

      



             (test code = 417)                                        



(MANUAL DIFFERENTIAL)2017 12:18:00





             Test Item    Value        Reference Range Interpretation Comments

 

             TOTAL COUNTED (BEAKER) (test code =                                

        



             1351)                                               

 

             WBC MORPHOLOGY (BEAKER) (test code = Normal                        

         



             487)                                                

 

             PLT MORPHOLOGY (BEAKER) (test code = Normal                        

         



             486)                                                

 

             RBC MORPHOLOGY (BEAKER) (test code = Normal                        

         



             762)

## 2022-12-14 NOTE — EDPHYS
Physician Documentation                                                                           

 [facility]                                                                 

Name: Redd Dale Jr                                                                            

Age: 37 yrs                                                                                       

Sex: Male                                                                                         

: 1985                                                                                   

MRN: [id number]                                                                                   

Arrival Date: 2022                                                                          

Time: 16:32                                                                                       

Account#: [id number]                                                                            

Bed 7                                                                                             

Private MD:                                                                                       

ED Physician Luis Cruz                                                                      

HPI:                                                                                              

                                                                                             

17:12 This 37 yrs old Black Male presents to ER via EMS with complaints of diarrhea, weak.    arlin 

17:12 The patient presents with an injury, spider. The complaints affect the left calf.       arlin 

      Context: The problem was sustained at an unknown site. Onset: The symptoms/episode          

      began/occurred. Modifying factors: The symptoms are alleviated by nothing. the symptoms     

      are aggravated by nothing. Associated signs and symptoms: Pertinent positives:              

      weakness. The patient presents with abdominal distention in the upper abdomen, in the       

      lower abdomen. The patient presents to the emergency department with diarrhea, that is      

      continuous.                                                                                 

                                                                                                  

Historical:                                                                                       

- Allergies:                                                                                      

16:36 Amoxicillin;                                                                            mb9 

16:36 Bactrim;                                                                                mb9 

16:36 Ciprofloxacin;                                                                          mb9 

16:36 CLAVULANIC ACID;                                                                        mb9 

16:36 Demerol;                                                                                mb9 

16:36 Doxycycline;                                                                            mb9 

16:36 Levofloxacin;                                                                           mb9 

16:36 Morphine;                                                                               mb9 

16:36 PENICILLINS;                                                                            mb9 

16:36 Toradol;                                                                                mb9 

16:36 TRIMETHOPRIM;                                                                           mb9 

16:36 Vancomycin;                                                                             mb9 

16:36 Zofran;                                                                                 mb9 

- PMHx:                                                                                           

16:36 Asthma; Cerebral Palsy; cluster headaches; decubitus ulcers on feet; GERD;              mb9 

      Hydrocephalus; Hypertension; spina bifida; Diabetes mellitus;                               

- PSHx:                                                                                           

16:36 Shunt Revision; Cholecystectomy;                                                        mb9 

                                                                                                  

- Immunization history:: Adult Immunizations up to date.                                          

- Social history:: Smoking status: Patient reports the use of cigarette tobacco                   

  products, smokes one-half pack cigarettes per day.                                              

- Family history:: not pertinent.                                                                 

                                                                                                  

                                                                                                  

ROS:                                                                                              

17:12 Constitutional: Negative for fever, chills, and weight loss, Eyes: Negative for injury, arlin 

      pain, redness, and discharge, ENT: Negative for injury, pain, and discharge, Neck:          

      Negative for injury, pain, and swelling, Cardiovascular: Negative for chest pain,           

      palpitations, and edema, Respiratory: Negative for shortness of breath, cough,              

      wheezing, and pleuritic chest pain, Back: Negative for injury and pain, : Negative        

      for injury, bleeding, discharge, and swelling, Skin: Negative for injury, rash, and         

      discoloration, Neuro: Negative for headache, weakness, numbness, tingling, and seizure,     

      Psych: Negative for depression, anxiety, suicide ideation, homicidal ideation, and          

      hallucinations, Allergy/Immunology: Negative for hives, rash, and allergies, Endocrine:     

      Negative for neck swelling, polydipsia, polyuria, polyphagia, and marked weight             

      changes, Hematologic/Lymphatic: Negative for swollen nodes, abnormal bleeding, and          

      unusual bruising.                                                                           

17:12 Abdomen/GI: Positive for diarrhea.                                                          

17:12 MS/extremity: Positive for decreased range of motion, of the left calf.                     

                                                                                                  

Exam:                                                                                             

17:12 Constitutional:  This is a well developed, well nourished patient who is awake, alert,  arlin 

      and in no acute distress. Head/Face:  Normocephalic, atraumatic. Eyes:  Pupils equal        

      round and reactive to light, extra-ocular motions intact.  Lids and lashes normal.          

      Conjunctiva and sclera are non-icteric and not injected.  Cornea within normal limits.      

      Periorbital areas with no swelling, redness, or edema. ENT:  Nares patent. No nasal         

      discharge, no septal abnormalities noted.  Tympanic membranes are normal and external       

      auditory canals are clear.  Oropharynx with no redness, swelling, or masses, exudates,      

      or evidence of obstruction, uvula midline.  Mucous membranes moist. Neck:  Trachea          

      midline, no thyromegaly or masses palpated, and no cervical lymphadenopathy.  Supple,       

      full range of motion without nuchal rigidity, or vertebral point tenderness.  No            

      Meningismus. Chest/axilla:  Normal chest wall appearance and motion.  Nontender with no     

      deformity.  No lesions are appreciated. Cardiovascular:  Regular rate and rhythm with a     

      normal S1 and S2.  No gallops, murmurs, or rubs.  Normal PMI, no JVD.  No pulse             

      deficits. Respiratory:  Lungs have equal breath sounds bilaterally, clear to                

      auscultation and percussion.  No rales, rhonchi or wheezes noted.  No increased work of     

      breathing, no retractions or nasal flaring. Abdomen/GI:  Soft, non-tender, with normal      

      bowel sounds.  No distension or tympany.  No guarding or rebound.  No evidence of           

      tenderness throughout. Back:  No spinal tenderness.  No costovertebral tenderness.          

      Full range of motion. Male :  Normal genitalia with no discharge or lesions. Skin:        

      Warm, dry with normal turgor.  Normal color with no rashes, no lesions, and no evidence     

      of cellulitis. MS/ Extremity:  Pulses equal, no cyanosis.  Neurovascular intact.  Full,     

      normal range of motion. Neuro:  Awake and alert, GCS 15, oriented to person, place,         

      time, and situation.  Cranial nerves II-XII grossly intact.  Motor strength 5/5 in all      

      extremities.  Sensory grossly intact.  Cerebellar exam normal.  Normal gait. Psych:         

      Awake, alert, with orientation to person, place and time.  Behavior, mood, and affect       

      are within normal limits.                                                                   

17:12 ECG was reviewed by the Attending Physician.                                                

17:52 Musculoskeletal/extremity: DVT Exam: no pain, no swelling, no tenderness, negative      arlin 

      Homans' sign noted on exam, no appreciated bluish discoloration, no erythema, no            

      increased warmth, left posterior calf wound , not red, not hot, not tender, not             

      draining.                                                                                   

                                                                                                  

Vital Signs:                                                                                      

16:32  / 99; Pulse 102; Resp 22; Temp 97.7; Pulse Ox 100% ; Weight 108.86 kg; Height 5  mb9 

      ft. 11 in. (180.34 cm); Pain 8/10;                                                          

18:30  / 80; Pulse 86; Resp 15; Pulse Ox 98% on R/A;                                    vg1 

19:33  / 80; Pulse 85; Resp 18 S; Pulse Ox 97% on R/A;                                  ha1 

21:30  / 90; Pulse 86; Resp 19 S; Pulse Ox 97% on R/A;                                  ha1 

16:32 Body Mass Index 33.47 (108.86 kg, 180.34 cm)                                            mb9 

                                                                                                  

MDM:                                                                                              

16:33 Patient medically screened.                                                             arlin 

17:15 Differential diagnosis: Nonspecific abd pain, viral gastroenteritis, gastroenteritis.   arlin 

      Data reviewed: vital signs, nurses notes, lab test result(s), EKG, radiologic studies,      

      CT scan, plain films. Data interpreted: Cardiac monitor: rate is 97 beats/min, rhythm       

      is regular, Pulse oximetry: on room air is 100 %. Test interpretation: by ED physician      

      or midlevel provider: ECG, plain radiologic studies. Counseling: I had a detailed           

      discussion with the patient and/or guardian regarding: the historical points, exam          

      findings, and any diagnostic results supporting the discharge/admit diagnosis, lab          

      results, radiology results.                                                                 

                                                                                                  

                                                                                             

16:40 Order name: Blood Culture Adult (2)                                                     [facility] 

                                                                                             

16:40 Order name: CBC with Diff; Complete Time: 17:34                                         [facility] 

                                                                                             

16:40 Order name: Lactate w/ 2H reflex if indic.; Complete Time: 17:50                        [facility] 

                                                                                             

16:40 Order name: Protime (+inr); Complete Time: 17:50                                        [facility] 

                                                                                             

16:40 Order name: Ptt, Activated; Complete Time: 17:50                                        [facility] 

                                                                                             

16:55 Order name: LFT's; Complete Time: 18:16                                                 [facility] 

                                                                                             

16:55 Order name: Magnesium; Complete Time: 18:16                                             [facility] 

                                                                                             

16:55 Order name: NT PRO-BNP; Complete Time: 18:16                                            [facility] 

                                                                                             

16:55 Order name: Troponin HS; Complete Time: 18:16                                           [facility] 

                                                                                             

16:55 Order name: COVID-19/FLU A+B; Complete Time: 18:15                                      [facility] 

                                                                                             

16:55 Order name: Wound Culture                                                               [facility] 

                                                                                             

16:40 Order name: EKG; Complete Time: 16:41                                                   [facility] 

                                                                                             

16:40 Order name: Cardiac monitoring; Complete Time: 16:47                                    [facility] 

                                                                                             

16:40 Order name: EKG - Nurse/Tech; Complete Time: 16:47                                      [facility] 

                                                                                             

16:55 Order name: XRAY Chest (1 view); Complete Time: 18:39                                   [facility] 

                                                                                             

17:11 Order name: CT Chest Abdomen Pelvis W/O Contrast; Complete Time: 18:39                  [facility] 

                                                                                             

17:28 Order name: Comprehensive Metabolic Panel; Complete Time: 18:16                         [facility]

                                                                                             

16:40 Order name: Labs collected and sent; Complete Time: 18:36                               [facility] 

                                                                                             

16:40 Order name: O2 Per Protocol; Complete Time: 16:40                                       [facility] 

                                                                                             

16:40 Order name: O2 Sat Monitoring; Complete Time: 16:40                                     [facility] 

                                                                                             

16:40 Order name: Vital Signs; Complete Time: 16:40                                           [facility] 

                                                                                             

16:55 Order name: IV Saline Lock; Complete Time: 17:51                                        [facility] 

                                                                                             

17:52 Order name: Wound dressing; Complete Time: 22:11                                        [facility] 

                                                                                                  

EC:12 Rate is 97 beats/min. Rhythm is regular. QRS Axis is Normal. WI interval is normal. QRS arlin 

      interval is normal. QT interval is normal. No Q waves. T waves are Normal. No ST            

      changes noted. Clinical impression: NSR w/ Non-specific ST/T Changes and No evidence of     

      ischemia. Interpreted by me. Reviewed by me.                                                

                                                                                                  

Administered Medications:                                                                         

17:42 Drug: NS 0.9% 1000 ml Route: IV; Rate: 1 bolus; Site: right upper arm;                  vg1 

17:45 Drug: Dilaudid (HYDROmorphone) 1 mg Route: IVP; Site: right upper arm;                  vg1 

18:45 Follow up: Response: No adverse reaction; No change in condition                        vg1 

17:47 Drug: Phenergan (promethazine) 25 mg Route: IM; Site: left deltoid;                     vg1 

18:52 Follow up: Response: No adverse reaction                                                vg1 

18:13 Drug: Meropenem 1 grams Route: IV; Rate: per protocol; Site: right upper arm;           vg1 

19:05 Follow up: IV Status: Completed infusion; IV Intake: 100ml                              vg1 

18:50 Drug: Dilaudid (HYDROmorphone) 1 mg Route: IVP; Site: right upper arm;                  vg1 

19:25 Drug: Magnesium Sulfate 2 grams Route: IVPB; Infused Over: 1 hrs; Site: right upper arm;ha1 

22:11 Follow up: Response: No adverse reaction; IV Status: Completed infusion; IV Intake: [id number] 

                                                                                                  

                                                                                                  

Disposition Summary:                                                                              

22 18:39                                                                                    

Discharge Ordered                                                                                 

      Location: Home                                                                          arlin 

      Problem: new                                                                            arlin 

      Symptoms: have improved                                                                 arlin 

      Condition: Stable                                                                       arlin 

      Diagnosis                                                                                   

        - Diarrhea, unspecified                                                               arlin 

        - Weakness                                                                            arlin 

        - Hypomagnesemia                                                                      arlin 

      Followup:                                                                               arlin 

        - With: Private Physician                                                                  

        - When: 2 - 3 days                                                                         

        - Reason: Recheck today's complaints, Continuance of care, Re-evaluation by your           

      physician                                                                                   

      Followup:                                                                               arlin 

        - With:                                                                                    

        - When: 2 - 3 days                                                                         

        - Reason: Recheck today's complaints, Re-evaluation by your physician                      

      Discharge Instructions:                                                                     

        - Discharge Summary Sheet                                                             arlin 

        - Diarrhea, Adult                                                                     arlin 

        - Weakness                                                                            arlin 

        - Wound Infection                                                                     arlin 

        - Diarrhea, Adult, Easy-to-Read                                                       arlin 

        - Hypomagnesemia                                                                      arlin 

        - Weakness, Easy-to-Read                                                              arlin 

        - Wound Infection, Easy-to-Read                                                       arlin 

        - Wound Care, Adult                                                                   arlin 

      Forms:                                                                                      

        - Medication Reconciliation Form                                                      arlin 

        - Thank You Letter                                                                    arlin 

        - Antibiotic Education                                                                arlin 

        - Prescription Opioid Use                                                             arlin 

        - SBAR form                                                                           ha1 

      Prescriptions:                                                                              

        - dicyclomine 20 mg Oral Tablet                                                            

            - take 1 tablet by ORAL route 4 times per day; 28 tablet; Refills: 0, Product     arlin 

      Selection Permitted                                                                         

Signatures:                                                                                       

Dispatcher [employer]                           EDMS                                                 

Luis Cruz MD MD cha Mickail, Joel, PA PA jmm Garcia, Victoria, RN                    RN   vg1                                                  

Ninoska Ordoñez RN                        RN   ha1                                                  

Roya Zhu RN                 RN   mb9                                                  

                                                                                                  

Corrections: (The following items were deleted from the chart)                                    

17:28 16:41 COMPREHENSIVE METABOLIC PANEL+C.LAB.BRZ ordered. EDMS                             EDMS

17:28 16:56 BASIC METABOLIC PANEL+C.LAB.BRZ ordered. EDMS                                     EDMS

18:36 16:40 Accucheck ordered. mb9                                                            aa5 

18:36 16:40 IV Saline Lock - Large Bore ordered. mb9                                          aa5 

                                                                                                  

**************************************************************************************************

## 2022-12-14 NOTE — ER
Nurse's Notes                                                                                     

 [facility]                                                                 

Name: Redd Dale Jr                                                                            

Age: 37 yrs                                                                                       

Sex: Male                                                                                         

: 1985                                                                                   

MRN: [id number]                                                                                   

Arrival Date: 2022                                                                          

Time: 16:32                                                                                       

Account#: [id number]                                                                            

Bed 7                                                                                             

Private MD:                                                                                       

Diagnosis: Diarrhea, unspecified;Weakness;Hypomagnesemia                                          

                                                                                                  

Presentation:                                                                                     

                                                                                             

16:32 Chief complaint: EMS states: pt has wound to back of left calf that occurred 1 month    mb9 

      ago from a spider bite. Pt states "I'm scared I'm going septic again. The wound is          

      oozing pus and is discolored. I also have diarrhea". Coronavirus screen: Vaccine            

      status: Patient reports receiving the 2nd dose of the covid vaccine. Ebola Screen: No       

      symptoms or risks identified at this time. Initial Sepsis Screen: Does the patient meet     

      any 2 criteria? RR > 20 per min. HR > 90 bpm. Does the patient have a suspected source      

      of infection? Yes: Skin breakdown/wound. Risk Assessment: Do you want to hurt yourself      

      or someone else? Patient reports no desire to harm self or others. Onset of symptoms        

      was 2022.                                                                      

16:32 Method Of Arrival: EMS: [facility]                                                    mb9 

16:32 Acuity: AYESHA 3                                                                           mb9 

                                                                                                  

Historical:                                                                                       

- Allergies:                                                                                      

16:36 Amoxicillin;                                                                            mb9 

16:36 Bactrim;                                                                                mb9 

16:36 Ciprofloxacin;                                                                          mb9 

16:36 CLAVULANIC ACID;                                                                        mb9 

16:36 Demerol;                                                                                mb9 

16:36 Doxycycline;                                                                            mb9 

16:36 Levofloxacin;                                                                           mb9 

16:36 Morphine;                                                                               mb9 

16:36 PENICILLINS;                                                                            mb9 

16:36 Toradol;                                                                                mb9 

16:36 TRIMETHOPRIM;                                                                           mb9 

16:36 Vancomycin;                                                                             mb9 

16:36 Zofran;                                                                                 mb9 

- PMHx:                                                                                           

16:36 Asthma; Cerebral Palsy; cluster headaches; decubitus ulcers on feet; GERD;              mb9 

      Hydrocephalus; Hypertension; spina bifida; Diabetes mellitus;                               

- PSHx:                                                                                           

16:36 Shunt Revision; Cholecystectomy;                                                        mb9 

                                                                                                  

- Immunization history:: Adult Immunizations up to date.                                          

- Social history:: Smoking status: Patient reports the use of cigarette tobacco                   

  products, smokes one-half pack cigarettes per day.                                              

- Family history:: not pertinent.                                                                 

                                                                                                  

                                                                                                  

Screenin:00 [facility] ED Fall Risk Assessment (Adult) History of falling in the last 3 months,       vg1 

      including since admission No falls in past 3 months (0 pts) Confusion or Disorientation     

      No (0 pts) Intoxicated or Sedated No (0 pts) Impaired Gait Yes (1 pt) Mobility Assist       

      Device Used Yes (1 pt) Altered Elimination No (0 pt) Score/Fall Risk Level 0 - 2 = Low      

      Risk Oriented to surroundings, Maintained a safe environment, Educated pt \T\ family on     

      fall prevention, incl call for assistance when getting out of bed. Abuse screen: Denies     

      threats or abuse. Nutritional screening: No deficits noted. Tuberculosis screening: No      

      symptoms or risk factors identified.                                                        

22:45 Fall Risk Fall in past 12 months (25 points). IV access (20 points). Gait- Impaired (20 ha1 

      pts.). Total Kim Fall Scale indicates High Risk Score (45 or more points). Fall           

      prevention measures have been instituted. Side Rails Up X 2 Placed Close to Nursing         

      Station Frequent Obs/Assessments Occuring.                                                  

                                                                                                  

Assessment:                                                                                       

16:36 Reassessment: Code sepsis called.                                                       mb9 

17:00 General: Appears in no apparent distress. uncomfortable, Behavior is calm, cooperative. vg1 

      Pain: Complains of pain in left leg Pain currently is 7 out of 10 on a pain scale. Pain     

      began x 1 month. Neuro: Level of Consciousness is awake, alert, obeys commands,             

      Oriented to person, place, time, situation. Cardiovascular: Patient's skin is warm and      

      dry. Respiratory: Airway is patent Respiratory effort is even, unlabored. GI: Abdomen       

      is round non-distended, obese, Reports diarrhea, since x 1 month. : No signs and/or       

      symptoms were reported regarding the genitourinary system. EENT: No signs and/or            

      symptoms were reported regarding the EENT system. Derm: Skin is pink, warm \T\ dry. Wound   

      noted left leg. Musculoskeletal: Capillary refill is > 3 seconds, in bilateral toes.        

18:00 Reassessment: Patient appears in no apparent distress at this time. No changes from     vg1 

      previously documented assessment. Patient and/or family updated on plan of care and         

      expected duration. Pain level reassessed. Patient is alert, oriented x 3, equal             

      unlabored respirations, skin warm/dry/pink. linen and brief changed.                        

18:18 Reassessment: Pt transported to CT via stretcher.                                       vg1 

18:40 Reassessment: pt up for d/c, currently waiting for IV antibiotics to complete and       vg1 

      Magnesium 2 g IV.                                                                           

19:30 General: Appears comfortable, Behavior is calm, cooperative. Pain: Complains of pain in ha1 

      left leg Pain does not radiate. Pain currently is 3 out of 10 on a pain scale. Neuro:       

      Level of Consciousness is awake, alert, obeys commands, Oriented to person, place,          

      time, situation. Cardiovascular: Heart tones S1 S2 present Patient's skin is warm and       

      dry. Respiratory: Airway is patent Respiratory effort is even, unlabored, Respiratory       

      pattern is regular, symmetrical. GI: Abdomen is round non-distended, obese. : No          

      signs and/or symptoms were reported regarding the genitourinary system. EENT: No            

      deficits noted. No signs and/or symptoms were reported regarding the EENT system. Derm:     

      Skin is normal. Musculoskeletal: Capillary refill is > 3 seconds.                           

19:34 Reassessment: awaiting medication administration.                                       ha1 

20:30 Reassessment: Patient and/or family updated on plan of care and expected duration. Pain ha1 

      level reassessed. Patient is alert, oriented x 3, equal unlabored respirations, skin        

      warm/dry/pink.                                                                              

21:30 Reassessment: Patient and/or family updated on plan of care and expected duration. Pain ha1 

      level reassessed. Patient is alert, oriented x 3, equal unlabored respirations, skin        

      warm/dry/pink. awaiting on EMS.                                                             

22:37 Reassessment: Patient and/or family updated on plan of care and expected duration. Pain ha1 

      level reassessed. Patient is alert, oriented x 3, equal unlabored respirations, skin        

      warm/dry/pink.                                                                              

                                                                                                  

Vital Signs:                                                                                      

16:32  / 99; Pulse 102; Resp 22; Temp 97.7; Pulse Ox 100% ; Weight 108.86 kg; Height 5  mb9 

      ft. 11 in. (180.34 cm); Pain 8/10;                                                          

18:30  / 80; Pulse 86; Resp 15; Pulse Ox 98% on R/A;                                    vg1 

19:33  / 80; Pulse 85; Resp 18 S; Pulse Ox 97% on R/A;                                  ha1 

21:30  / 90; Pulse 86; Resp 19 S; Pulse Ox 97% on R/A;                                  ha1 

16:32 Body Mass Index 33.47 (108.86 kg, 180.34 cm)                                            mb9 

                                                                                                  

ED Course:                                                                                        

16:32 Patient arrived in ED.                                                                  mb9 

16:33 Luis Cruz MD is Attending Physician.                                             [facility] 

16:36 Triage completed.                                                                       mb9 

16:38 Arm band placed on.                                                                     mb9 

16:39 Placed in gown. Bed in low position. Call light in reach. Side rails up X 1. Client     mb9 

      placed on continuous cardiac and pulse oximetry monitoring. NIBP monitoring applied.        

      Cardiac monitor on.                                                                         

17:00 Rosa M Long RN is Primary Nurse.                                                  vg1 

17:00 Inserted saline lock: 22 gauge 24 gauge in right upper arm, using aseptic technique.    vg1 

      ,using aseptic technique. completed by Vilma COTTO.                                            

18:02 XRAY Chest (1 view) In Process Unspecified.                                             EDMS

18:27 CT Chest Abdomen Pelvis W/O Contrast In Process Unspecified.                            EDMS

18:39 Ladonna Perez MD is Referral Physician.                                                  [facility] 

19:20 Primary Nurse role handed off by Rosa M Long RN                                   wm  

22:38 No provider procedures requiring assistance completed. IV discontinued, intact,         ha1 

      bleeding controlled, No redness/swelling at site. Pressure dressing applied.                

                                                                                                  

Administered Medications:                                                                         

17:42 Drug: NS 0.9% 1000 ml Route: IV; Rate: 1 bolus; Site: right upper arm;                  vg1 

17:45 Drug: Dilaudid (HYDROmorphone) 1 mg Route: IVP; Site: right upper arm;                  vg1 

18:45 Follow up: Response: No adverse reaction; No change in condition                        vg1 

17:47 Drug: Phenergan (promethazine) 25 mg Route: IM; Site: left deltoid;                     vg1 

18:52 Follow up: Response: No adverse reaction                                                vg1 

18:13 Drug: Meropenem 1 grams Route: IV; Rate: per protocol; Site: right upper arm;           vg1 

19:05 Follow up: IV Status: Completed infusion; IV Intake: 100ml                              vg1 

18:50 Drug: Dilaudid (HYDROmorphone) 1 mg Route: IVP; Site: right upper arm;                  vg1 

19:25 Drug: Magnesium Sulfate 2 grams Route: IVPB; Infused Over: 1 hrs; Site: right upper arm;ha1 

22:11 Follow up: Response: No adverse reaction; IV Status: Completed infusion; IV Intake: 10hesa9 

                                                                                                  

                                                                                                  

Medication:                                                                                       

17:00 VIS not applicable for this client.                                                     vg1 

                                                                                                  

Intake:                                                                                           

19:05 IV: 100ml; Total: 100ml.                                                                vg1 

22:11 IV: 50ml; Total: 150ml.                                                                 ha1 

                                                                                                  

Outcome:                                                                                          

18:39 Discharge ordered by MD.                                                                arlin 

22:39 Discharged to home via ambulance, [facility]                                      ha1 

22:39 Condition: stable                                                                           

22:39 Discharge instructions given to patient, Instructed on discharge instructions, follow       

      up and referral plans. medication usage, Demonstrated understanding of instructions,        

      follow-up care, medications.                                                                

22:46 Patient left the ED.                                                                    ha1 

                                                                                                  

Signatures:                                                                                       

Dispatcher [employer]                           EDMS                                                 

Luis Cruz MD MD cha Garcia, Victoria, RN                    RN   vg1                                                  

Kristel Minor Heidy RN                        RN   ha1                                                  

Roya Zhu, RN                 RN   mb9                                                  

                                                                                                  

**************************************************************************************************

## 2022-12-14 NOTE — RAD REPORT
EXAM DESCRIPTION:  CT - Chest Abd Pelvis Wo Con - 12/14/2022 6:25 pm

 

CLINICAL HISTORY:   Chest and abdominal pain. Diarrhea

 

COMPARISON:  CT chest 2018

 

CT abdomen November 2022

 

TECHNIQUE:  Computed axial tomography of the chest, abdomen and pelvis was obtained. Oral contrast wa
s given. IV contrast was not requested.

 

All CT scans are performed using dose optimization technique as appropriate and may include automated
 exposure control or mA/KV adjustment according to patient size.

 

FINDINGS:   The evaluation of mediastinum, magdaleno, vessels and solid organs is limited secondary to the
 lack of IV contrast administration

 

The lungs are clear

 

 No mediastinal or hilar lymphadenopathy is seen.

 

A pleural effusion is not present. A pericardial effusion is not seen.

 

The liver is mildly enlarged.

 

Spleen, pancreas, adrenals and kidneys appear grossly normal

 

There is no evidence of diverticulitis.

 

Spina bifida. Normal appendix.

 

Suprapubic catheter in place with mild bladder wall thickening

 

 shunt in place

 

IMPRESSION:  Mild hepatomegaly

## 2022-12-14 NOTE — RAD REPORT
EXAM DESCRIPTION:  [place] Single View12/14/2022 6:00 pm

 

CLINICAL HISTORY:  cough

 

COMPARISON:  November 2022

 

FINDINGS:   The lungs appear clear of acute infiltrate. The heart is normal size

 

IMPRESSION:   No acute abnormalities displayed

## 2022-12-15 NOTE — EKG
Test Date:    2022-12-14               Test Time:    16:54:54

Technician:   MB                                     

                                                     

MEASUREMENT RESULTS:                                       

Intervals:                                           

Rate:         97                                     

UT:           136                                    

QRSD:         94                                     

QT:           346                                    

QTc:          439                                    

Axis:                                                

P:            54                                     

UT:           136                                    

QRS:          62                                     

T:            19                                     

                                                     

INTERPRETIVE STATEMENTS:                                       

                                                     

Normal sinus rhythm with sinus arrhythmia

Cannot rule out Anterior infarct, age undetermined

Abnormal ECG

Compared to ECG 06/14/2022 03:56:07

Myocardial infarct finding now present

T-wave abnormality no longer present

Possible ischemia no longer present



Electronically Signed On 12-15-22 08:01:45 CST by Cheng Anglin

## 2023-01-23 ENCOUNTER — HOSPITAL ENCOUNTER (INPATIENT)
Dept: HOSPITAL 97 - ER | Age: 38
LOS: 7 days | Discharge: HOME | DRG: 698 | End: 2023-01-30
Attending: INTERNAL MEDICINE | Admitting: INTERNAL MEDICINE
Payer: COMMERCIAL

## 2023-01-23 VITALS — BODY MASS INDEX: 57.7 KG/M2

## 2023-01-23 DIAGNOSIS — Z88.8: ICD-10-CM

## 2023-01-23 DIAGNOSIS — Z90.49: ICD-10-CM

## 2023-01-23 DIAGNOSIS — N30.90: ICD-10-CM

## 2023-01-23 DIAGNOSIS — N17.9: ICD-10-CM

## 2023-01-23 DIAGNOSIS — K21.9: ICD-10-CM

## 2023-01-23 DIAGNOSIS — Z20.822: ICD-10-CM

## 2023-01-23 DIAGNOSIS — Q05.7: ICD-10-CM

## 2023-01-23 DIAGNOSIS — Z91.018: ICD-10-CM

## 2023-01-23 DIAGNOSIS — T83.592A: Primary | ICD-10-CM

## 2023-01-23 DIAGNOSIS — T38.3X6A: ICD-10-CM

## 2023-01-23 DIAGNOSIS — E86.1: ICD-10-CM

## 2023-01-23 DIAGNOSIS — A41.02: ICD-10-CM

## 2023-01-23 DIAGNOSIS — Z79.899: ICD-10-CM

## 2023-01-23 DIAGNOSIS — E11.10: ICD-10-CM

## 2023-01-23 DIAGNOSIS — G82.20: ICD-10-CM

## 2023-01-23 DIAGNOSIS — Z99.3: ICD-10-CM

## 2023-01-23 DIAGNOSIS — I10: ICD-10-CM

## 2023-01-23 DIAGNOSIS — F17.200: ICD-10-CM

## 2023-01-23 DIAGNOSIS — E86.0: ICD-10-CM

## 2023-01-23 DIAGNOSIS — E87.0: ICD-10-CM

## 2023-01-23 DIAGNOSIS — Z88.0: ICD-10-CM

## 2023-01-23 DIAGNOSIS — Z91.14: ICD-10-CM

## 2023-01-23 DIAGNOSIS — Z88.5: ICD-10-CM

## 2023-01-23 DIAGNOSIS — Z79.4: ICD-10-CM

## 2023-01-23 DIAGNOSIS — Z88.1: ICD-10-CM

## 2023-01-23 LAB
ALBUMIN SERPL BCP-MCNC: 4 G/DL (ref 3.4–5)
ALP SERPL-CCNC: 168 U/L (ref 45–117)
ALT SERPL W P-5'-P-CCNC: 30 U/L (ref 16–61)
AST SERPL W P-5'-P-CCNC: 8 U/L (ref 15–37)
BUN BLD-MCNC: 35 MG/DL (ref 7–18)
GLUCOSE SERPLBLD-MCNC: 1673 MG/DL (ref 74–106)
HCT VFR BLD CALC: 54.5 % (ref 39.6–49)
LIPASE SERPL-CCNC: 4394 U/L (ref 73–393)
LYMPHOCYTES # SPEC AUTO: 0.6 K/UL (ref 0.7–4.9)
MCV RBC: 95.8 FL (ref 80–100)
PMV BLD: 9.9 FL (ref 7.6–11.3)
POTASSIUM SERPL-SCNC: 5 MMOL/L (ref 3.5–5.1)
RBC # BLD: 6.36 M/UL (ref 4.33–5.43)

## 2023-01-23 PROCEDURE — 80061 LIPID PANEL: CPT

## 2023-01-23 PROCEDURE — 87811 SARS-COV-2 COVID19 W/OPTIC: CPT

## 2023-01-23 PROCEDURE — 5A09557 ASSISTANCE WITH RESPIRATORY VENTILATION, GREATER THAN 96 CONSECUTIVE HOURS, CONTINUOUS POSITIVE AIRWAY PRESSURE: ICD-10-PCS

## 2023-01-23 PROCEDURE — 94660 CPAP INITIATION&MGMT: CPT

## 2023-01-23 PROCEDURE — 83605 ASSAY OF LACTIC ACID: CPT

## 2023-01-23 PROCEDURE — 82947 ASSAY GLUCOSE BLOOD QUANT: CPT

## 2023-01-23 PROCEDURE — 84443 ASSAY THYROID STIM HORMONE: CPT

## 2023-01-23 PROCEDURE — 85025 COMPLETE CBC W/AUTO DIFF WBC: CPT

## 2023-01-23 PROCEDURE — 82010 KETONE BODYS QUAN: CPT

## 2023-01-23 PROCEDURE — 87086 URINE CULTURE/COLONY COUNT: CPT

## 2023-01-23 PROCEDURE — 71045 X-RAY EXAM CHEST 1 VIEW: CPT

## 2023-01-23 PROCEDURE — 36415 COLL VENOUS BLD VENIPUNCTURE: CPT

## 2023-01-23 PROCEDURE — 87088 URINE BACTERIA CULTURE: CPT

## 2023-01-23 PROCEDURE — 83036 HEMOGLOBIN GLYCOSYLATED A1C: CPT

## 2023-01-23 PROCEDURE — 87186 SC STD MICRODIL/AGAR DIL: CPT

## 2023-01-23 PROCEDURE — 81003 URINALYSIS AUTO W/O SCOPE: CPT

## 2023-01-23 PROCEDURE — 94760 N-INVAS EAR/PLS OXIMETRY 1: CPT

## 2023-01-23 PROCEDURE — 83690 ASSAY OF LIPASE: CPT

## 2023-01-23 PROCEDURE — 80048 BASIC METABOLIC PNL TOTAL CA: CPT

## 2023-01-23 PROCEDURE — 80053 COMPREHEN METABOLIC PANEL: CPT

## 2023-01-23 PROCEDURE — 81015 MICROSCOPIC EXAM OF URINE: CPT

## 2023-01-23 PROCEDURE — 02HV33Z INSERTION OF INFUSION DEVICE INTO SUPERIOR VENA CAVA, PERCUTANEOUS APPROACH: ICD-10-PCS

## 2023-01-23 PROCEDURE — 87040 BLOOD CULTURE FOR BACTERIA: CPT

## 2023-01-23 PROCEDURE — 87077 CULTURE AEROBIC IDENTIFY: CPT

## 2023-01-23 PROCEDURE — 87205 SMEAR GRAM STAIN: CPT

## 2023-01-23 PROCEDURE — 99285 EMERGENCY DEPT VISIT HI MDM: CPT

## 2023-01-23 RX ADMIN — SODIUM CHLORIDE SCH MLS: 0.9 INJECTION, SOLUTION INTRAVENOUS at 18:00

## 2023-01-23 NOTE — ER
Nurse's Notes                                                                                     

 [facility]                                                                 

Name: Redd Dale Jr                                                                            

Age: 37 yrs                                                                                       

Sex: Male                                                                                         

: 1985                                                                                   

MRN: [id number]                                                                                   

Arrival Date: 2023                                                                          

Time: 12:34                                                                                       

Account#: [id number]                                                                            

Bed 5                                                                                             

Private MD:                                                                                       

Diagnosis: Diabetes mellitus due to underlying condition with ketoacidosis;Severe sepsis with     

  septic shock                                                                                    

                                                                                                  

Presentation:                                                                                     

                                                                                             

12:34 Chief complaint: EMS states: toned out to patient home from abdominal pain \T\ lower back ld1

      pain. Coronavirus screen: At this time, the client does not indicate any symptoms           

      associated with coronavirus-19. Ebola Screen: No symptoms or risks identified at this       

      time. Risk Assessment: Do you want to hurt yourself or someone else? Patient reports no     

      desire to harm self or others. Onset of symptoms was 2023 at 12:35.             

12:34 Method Of Arrival: EMS: [facility]                                                    ld1 

12:34 Acuity: AYESHA 3                                                                           ld1 

15:22 Acuity: AYESHA 2                                                                           iw  

                                                                                                  

Triage Assessment:                                                                                

12:35 General: Appears in no apparent distress. comfortable, Behavior is calm, cooperative,   ld1 

      appropriate for age. Pain: Complains of pain in back and abdomen Pain does not radiate.     

      Pain currently is 10 out of 10 on a pain scale. Quality of pain is described as             

      throbbing. EENT: No signs and/or symptoms were reported regarding the EENT system.          

      Neuro: Level of Consciousness is awake, alert, obeys commands, Oriented to person,          

      place, time, situation. Cardiovascular: Capillary refill < 3 seconds Patient's skin is      

      warm and dry. Respiratory: Airway is patent Respiratory effort is even, unlabored. GI:      

      Abdomen is round non-distended, Reports lower abdominal pain, upper abdominal pain. :     

      No signs and/or symptoms were reported regarding the genitourinary system. Derm: No         

      signs and/or symptoms reported regarding the dermatologic system. Musculoskeletal: No       

      signs and/or symptoms reported regarding the musculoskeletal system.                        

                                                                                                  

Historical:                                                                                       

- Allergies:                                                                                      

12:35 Amoxicillin;                                                                            ld1 

12:35 Bactrim;                                                                                ld1 

12:35 Ciprofloxacin;                                                                          ld1 

12:35 CLAVULANIC ACID;                                                                        ld1 

12:35 Demerol;                                                                                ld1 

12:35 Doxycycline;                                                                            ld1 

12:35 Levofloxacin;                                                                           ld1 

12:35 Morphine;                                                                               ld1 

12:35 PENICILLINS;                                                                            ld1 

12:35 Toradol;                                                                                ld1 

12:35 TRIMETHOPRIM;                                                                           ld1 

12:35 Vancomycin;                                                                             ld1 

12:35 Zofran;                                                                                 ld1 

- PMHx:                                                                                           

12:35 Asthma; Cerebral Palsy; cluster headaches; decubitus ulcers on feet; diabetes mellitus; ld1 

      GERD; Hydrocephalus; Hypertension; spina bifida;                                            

- PSHx:                                                                                           

12:35 Cholecystectomy; Shunt Revision;                                                        ld1 

                                                                                                  

- Immunization history:: Adult Immunizations up to date, Client reports receiving the             

  2nd dose of the Covid vaccine.                                                                  

- Social history:: Smoking status: Patient denies any tobacco usage or history of.                

  Patient/guardian denies using alcohol.                                                          

                                                                                                  

                                                                                                  

Screenin:36 [facility] ED Fall Risk Assessment (Adult) History of falling in the last 3 months,       ld1 

      including since admission No falls in past 3 months (0 pts). Abuse screen: Denies           

      threats or abuse. Denies injuries from another. Nutritional screening: No deficits          

      noted. Tuberculosis screening: No symptoms or risk factors identified.                      

                                                                                                  

Assessment:                                                                                       

12:36 Reassessment: See triage assessment.                                                    ld1 

13:00 Reassessment: No changes from previously documented assessment. Pt C/O "All over" pain. ld1 

      Patient states symptoms have not improved.                                                  

13:00 Neuro: Level of Consciousness is awake, alert, obeys commands, Oriented to person,      ld1 

      place, time, situation. Respiratory: Airway is patent Respiratory effort is even,           

      unlabored.                                                                                  

13:45 Reassessment: ERP at bedside assisting nurses with IV access.                           ld1 

15:30 Reassessment: ERP at bedside inserting central line.                                    ld1 

15:30 Reassessment: Pt continuously requesting pain medication - notified ERP of request. See ld1 

      MAR for orders.                                                                             

17:21 Reassessment: No changes from previously documented assessment. Patient and/or family   ld1 

      updated on plan of care and expected duration. Pain level reassessed. Patient states        

      symptoms have not improved.                                                                 

                                                                                                  

Vital Signs:                                                                                      

12:42  / 113; Pulse 114; Resp 20; Temp 97.9; Pulse Ox 95% on 1 lpm NC; Weight 81.65 kg; mb9 

      Height 4 ft. 11 in. (149.86 cm); Pain 10/10;                                                

13:30  / 67; Pulse 118; Resp 16; Pulse Ox 93% on 2 lpm NC;                              ld1 

14:09 BP 92 / 75; Pulse 117; Resp 16; Pulse Ox 94% on 2 lpm NC; Pain 8/10;                    ld1 

14:22 BP 94 / 65; Pulse 121; Resp 18; Pulse Ox 90% on 2 lpm NC;                               ld1 

15:12 BP 92 / 79; Pulse 116; Resp 18; Pulse Ox 92% on 3 lpm NC;                               ld1 

15:20 BP 94 / 66; Pulse 116; Resp 18; Pulse Ox 97% on 5 lpm NC;                               ld1 

15:37 BP 85 / 72; Pulse 114; Resp 18; Pulse Ox 98% on 5 lpm NC;                               ld1 

16:25 BP 83 / 60; Pulse 112; Resp 18; Pulse Ox 92% on 5 lpm NC;                               ld1 

16:32 Weight 127.01 kg;                                                                       ld1 

16:40  / 71; Pulse 116; Resp 18; Pulse Ox 94% on 3 lpm NC;                              ld1 

17:00 BP 87 / 71; Pulse 113; Resp 18; Pulse Ox 98% on 3 lpm NC;                               ld1 

16:32 Body Mass Index 56.55 (127.01 kg, 149.86 cm)                                            ld1 

                                                                                                  

ED Course:                                                                                        

12:34 Patient arrived in ED.                                                                  ld1 

12:34 Sunday Mosher PA is PHCP.                                                               [id number] 

12:34 Siddharth Gonsalez DO is Attending Physician.                                                jr8 

12:35 Triage completed.                                                                       ld1 

12:35 Arm band placed on right wrist.                                                         ld1 

12:36 Patient has correct armband on for positive identification. Placed in gown. Bed in low  ld1 

      position. Call light in reach. Side rails up X2. Cardiac monitor on. Pulse ox on. NIBP      

      on. Door closed. Noise minimized. Warm blanket given.                                       

13:06 Suly Jang, RN is Primary Nurse.                                                   ld1 

13:30 Urine Microscopic Only Sent.                                                            ld1 

16:04 Assisted provider with central line placement. Set up central line tray. Triple lumen   ld1 

      line placed in left internal jugular. Line placed by Sunday CLAY Placement verified      

      by CXR, Dressed with Tegaderm, Blood was collected. Patient tolerated well.                 

16:25 Chest Single View XRAY In Process Unspecified.                                          EDMS

16:35 Domingo Mc MD is Hospitalizing Provider.                                            jr8 

16:55 SARS RAPID Sent.                                                                        ld1 

16:55 Glucose Sent.                                                                           ld1 

17:22 Report given to Nicole Andrade RN.                                                     ld1

                                                                                                  

Administered Medications:                                                                         

14:04 Drug: NS 0.9% 1000 ml Route: IV; Rate: 1 bolus; Site: left antecubital;                 ld1 

14:09 Drug: Dilaudid (HYDROmorphone) 0.5 mg Route: IVP; Site: left upper arm;                 ld1 

17:23 Follow up: Response: No adverse reaction                                                ld1 

15:40 Drug: NS 0.9% 1000 ml Route: IV; Rate: 1 bolus; Site: left upper arm;                   ld1 

17:23 Follow up: Response: No adverse reaction                                                ld1 

15:40 Drug: Dilaudid (HYDROmorphone) 1 mg Route: IVP; Site: left jugular;                     ld1 

16:33 Follow up: Response: No adverse reaction                                                ld1 

17:23 Follow up: Response: No adverse reaction                                                ld1 

16:13 Not Given (Physician Discretion): Dilaudid (HYDROmorphone) 0.5 mg IVP once              jr8 

16:55 Drug: Cefepime 1 grams Route: IVPB; Rate: 200 ml/hr; Infused Over: 30 mins; Site: left  ld1 

      upper arm;                                                                                  

17:23 Follow up: Response: No adverse reaction; IV Status: Completed infusion                 ld1 

17:25 Drug: NS 0.9% 1000 ml Route: IV; Rate: 1000 ml; Site: Other;                            ap3 

19:36 Not Given (charted in [employer]Clinical ): Insulin Drip - (Insulin Regular Human 100 units, NS   jh5 

      0.9% 100 ml) IV at calculated rate continuous; Standard concentration 1unit/ml; Dose        

      for DKA is 0.1 units/kg/hr                                                                  

                                                                                                  

                                                                                                  

Medication:                                                                                       

12:36 VIS not applicable for this client.                                                     ld1 

                                                                                                  

Output:                                                                                           

17:25 Urine: 500ml; Total: 500ml.                                                             rs5 

                                                                                                  

Outcome:                                                                                          

16:36 Decision to Hospitalize by Provider.                                                    jr8 

                                                                                             

21:37 Patient left the ED.                                                                    jb4 

                                                                                                  

Signatures:                                                                                       

Dispatcher [employer]                           EDMS                                                 

Renetta Terry RN                     Sunday Pimentel PA PA   jr8                                                  

Hua Lam RN RN   jb4                                                  

Nicole Mcclure RN RN   ap3                                                  

Suly Jang RN                     RN   ld1                                                  

Roya Zhu RN                 RN   mb9                                                  

Marco Catherine                               rs5                                                  

Michelle Gilmore RN   jh5                                                  

                                                                                                  

Corrections: (The following items were deleted from the chart)                                    

                                                                                             

16:05 12:36 No provider procedures requiring assistance completed. ld1                        ld1 

                                                                                                  

**************************************************************************************************

## 2023-01-23 NOTE — EDPHYS
Physician Documentation                                                                           

 [facility]                                                                 

Name: Redd Dale Jr                                                                            

Age: 37 yrs                                                                                       

Sex: Male                                                                                         

: 1985                                                                                   

MRN: [id number]                                                                                   

Arrival Date: 2023                                                                          

Time: 12:34                                                                                       

Account#: [id number]                                                                            

Bed 5                                                                                             

Private MD:                                                                                       

ED Physician Siddharth Gonsalez                                                                         

HPI:                                                                                              

                                                                                             

14:18 This 37 yrs old Black Male presents to ER via EMS with complaints of Abdominal Pain,    jr8 

      Back Pain.                                                                                  

14:18 Severity of pain: At its worst the pain was moderate in the emergency department the    jr8 

      pain is unchanged. The patient has experienced similar episodes in the past, a few          

      times. The patient has not recently seen a physician. This is a 37-year-old male            

      patient that presented to the emergency room via EMS after calling out for lower            

      abdominal discomfort and back pain along with suspicions for diabetic ketoacidosis.         

      Patient stated that he has been nauseated and dry. Sugars have been running high at         

      home. Complains of shortness of breath. Stated that he thinks he is having trouble with     

      his bladder again. Has a suprapubic catheter with frequent urinary problems including       

      chronic urinary tract infections..                                                          

                                                                                                  

Historical:                                                                                       

- Allergies:                                                                                      

12:35 Amoxicillin;                                                                            ld1 

12:35 Bactrim;                                                                                ld1 

12:35 Ciprofloxacin;                                                                          ld1 

12:35 CLAVULANIC ACID;                                                                        ld1 

12:35 Demerol;                                                                                ld1 

12:35 Doxycycline;                                                                            ld1 

12:35 Levofloxacin;                                                                           ld1 

12:35 Morphine;                                                                               ld1 

12:35 PENICILLINS;                                                                            ld1 

12:35 Toradol;                                                                                ld1 

12:35 TRIMETHOPRIM;                                                                           ld1 

12:35 Vancomycin;                                                                             ld1 

12:35 Zofran;                                                                                 ld1 

- PMHx:                                                                                           

12:35 Asthma; Cerebral Palsy; cluster headaches; decubitus ulcers on feet; diabetes mellitus; ld1 

      GERD; Hydrocephalus; Hypertension; spina bifida;                                            

- PSHx:                                                                                           

12:35 Cholecystectomy; Shunt Revision;                                                        ld1 

                                                                                                  

- Immunization history:: Adult Immunizations up to date, Client reports receiving the             

  2nd dose of the Covid vaccine.                                                                  

- Social history:: Smoking status: Patient denies any tobacco usage or history of.                

  Patient/guardian denies using alcohol.                                                          

                                                                                                  

                                                                                                  

ROS:                                                                                              

14:18 Eyes: Negative for injury, pain, redness, and discharge, ENT: Negative for injury,      jr8 

      pain, and discharge, Neck: Negative for injury, pain, and swelling, Cardiovascular:         

      Negative for chest pain, palpitations, and edema, Back: Negative for injury and pain,       

      MS/Extremity: Negative for injury and deformity, Skin: Negative for injury, rash, and       

      discoloration, Neuro: Negative for headache, weakness, numbness, tingling, and seizure.     

14:18 Respiratory: Positive for shortness of breath.                                              

14:18 Abdomen/GI: Positive for abdominal pain, nausea.                                            

14:18 Endocrine: Positive for polydipsia.                                                         

                                                                                                  

Exam:                                                                                             

14:18 Constitutional:  This is a well developed, well nourished patient who is awake, alert,  jr8 

      and in no acute distress.                                                                   

14:18 Neck:  Trachea midline, no thyromegaly or masses palpated, and no cervical                  

      lymphadenopathy.  Supple, full range of motion without nuchal rigidity, or vertebral        

      point tenderness.  No Meningismus.                                                          

14:18 Skin:  Warm, dry with normal turgor.  Normal color with no rashes, no lesions, and no       

      evidence of cellulitis. MS/ Extremity:  Pulses equal, no cyanosis.  Neurovascular           

      intact.  Full, normal range of motion. Neuro:  Awake and alert, GCS 15, oriented to         

      person, place, time, and situation.  Motor strength 5/5 in all extremities.  Sensory        

      grossly intact.                                                                             

14:18 ENT: Mouth: Lips: dry, Oral mucosa: dry.                                                    

14:18 Cardiovascular: Rate: tachycardic, Rhythm: regular, Pulses: Pulses are 2+ in right          

      radial artery and left radial artery. Heart sounds: normal, normal S1and S2, no S3 or       

      S4, no murmur, no rub, no gallop, Edema: is not appreciated.                                

14:18 Abdomen/GI: Inspection: obese suprapubic catheter in place. No discharge around             

      catheter , Bowel sounds: active, all quadrants, Palpation: soft, in all quadrants, mild     

      abdominal tenderness, in the suprapubic area, right lower quadrant and left lower           

      quadrant, mass, is not appreciated, rebound tenderness, is not appreciated, voluntary       

      guarding, is not appreciated, involuntary guarding, is not appreciated, no appreciated      

      organomegaly, Indicators: McBurney's point is not tender, Khan's sign is negative,        

      Rovsing's sign is negative.                                                                 

                                                                                                  

Vital Signs:                                                                                      

12:42  / 113; Pulse 114; Resp 20; Temp 97.9; Pulse Ox 95% on 1 lpm NC; Weight 81.65 kg; mb9 

      Height 4 ft. 11 in. (149.86 cm); Pain 10/10;                                                

13:30  / 67; Pulse 118; Resp 16; Pulse Ox 93% on 2 lpm NC;                              ld1 

14:09 BP 92 / 75; Pulse 117; Resp 16; Pulse Ox 94% on 2 lpm NC; Pain 8/10;                    ld1 

14:22 BP 94 / 65; Pulse 121; Resp 18; Pulse Ox 90% on 2 lpm NC;                               ld1 

15:12 BP 92 / 79; Pulse 116; Resp 18; Pulse Ox 92% on 3 lpm NC;                               ld1 

15:20 BP 94 / 66; Pulse 116; Resp 18; Pulse Ox 97% on 5 lpm NC;                               ld1 

15:37 BP 85 / 72; Pulse 114; Resp 18; Pulse Ox 98% on 5 lpm NC;                               ld1 

16:25 BP 83 / 60; Pulse 112; Resp 18; Pulse Ox 92% on 5 lpm NC;                               ld1 

16:32 Weight 127.01 kg;                                                                       ld1 

16:40  / 71; Pulse 116; Resp 18; Pulse Ox 94% on 3 lpm NC;                              ld1 

17:00 BP 87 / 71; Pulse 113; Resp 18; Pulse Ox 98% on 3 lpm NC;                               ld1 

16:32 Body Mass Index 56.55 (127.01 kg, 149.86 cm)                                            ld1 

                                                                                                  

Procedures:                                                                                       

17:29 Central Line: the site was prepped with in sterile fashion, a triple lumen catheter was jr8 

      inserted, in the left internal jugular vein, in 1 attempts. placement was verified, by      

      CXR, by blood return, the site was dressed with 4X4s, Tegaderm, foam tape, using            

      sterile technique, the patient tolerated the procedure, well.                               

                                                                                                  

MDM:                                                                                              

12:34 Patient medically screened.                                                             jr8 

16:33 Data reviewed: vital signs, nurses notes, lab test result(s), radiologic studies, plain jr8 

      films. Consideration of Admission/Observation Patient was admitted/placed on                

      observation. Escalation of care including admission/observation considered. Management      

      of patient was discussed with the following: Hospitalist: Dr. Mc who is patients PCP     

      consulted and will see patient. Admitted to ICU for continued care . I considered the       

      following discharge prescriptions or medication management in the emergency department      

      Medications were administered in the Emergency Department. See MAR. Independent             

      interpretation of the following test(s) in the Emergency Department X-Ray: My               

      interpretation is No acute cardiopulmonary abnormalities seen. Central line at tip of       

      RA. Care significantly affected by the following chronic conditions: Diabetes, Obesity,     

      Chronic Kidney Disease. Counseling: I had a detailed discussion with the patient and/or     

      guardian regarding: the historical points, exam findings, and any diagnostic results        

      supporting the discharge/admit diagnosis, lab results, radiology results, the need for      

      further work-up and treatment in the hospital. Response to treatment: the patient's         

      symptoms have mildly improved after treatment.                                              

                                                                                                  

                                                                                             

13:03 Order name: CBC with Diff; Complete Time: 15:56                                         [facility] 

                                                                                             

13:03 Order name: CMP; Complete Time: 15:14                                                   [facility] 

                                                                                             

13:03 Order name: Lipase; Complete Time: 15:14                                                [facility] 

                                                                                             

13:03 Order name: Urine Microscopic Only; Complete Time: 14:02                                [facility] 

                                                                                             

13:03 Order name: Ketone, Serum; Complete Time: 15:14                                         [facility] 

                                                                                             

13:25 Order name: Urine Dipstick-Ancillary; Complete Time: 14:02                              [facility]

                                                                                             

13:44 Order name: Glucose, Ancillary Testing; Complete Time: 14:02                            [facility]

                                                                                             

13:46 Order name: Urine Culture                                                               [facility]

                                                                                             

15:57 Order name: Blood Culture Adult (2)                                                     [facility] 

                                                                                             

15:57 Order name: Lactate w/ 2H reflex if indic.; Complete Time: 17:10                        [facility] 

                                                                                             

16:28 Order name: Urine Culture; Complete Time: 16:37                                         [facility] 

                                                                                             

16:28 Order name: Urine Microscopic Only; Complete Time: 16:37                                [facility] 

                                                                                             

16:33 Order name: SARS RAPID; Complete Time: 17:25                                            ld1 

                                                                                             

16:45 Order name: Glucose; Complete Time: 17:29                                               mb9 

                                                                                             

16:57 Order name: Glucose, Ancillary Testing; Complete Time: 17:10                            [facility]

                                                                                             

17:10 Order name: Hemoglobin A1c; Complete Time: 05:35                                        [facility]

                                                                                             

17:10 Order name: Lipid Profile                                                               [facility]

                                                                                             

17:10 Order name: Thyroid Stimulating Hormone                                                 [facility]

                                                                                             

17:10 Order name: CBC with Automated Diff                                                     [facility]

                                                                                             

17:10 Order name: CBC with Automated Diff; Complete Time: 05:35                               [facility]

                                                                                             

17:10 Order name: CBC with Automated Diff                                                     [facility]

                                                                                             

17:10 Order name: CBC with Automated Diff                                                     [facility]

                                                                                             

17:10 Order name: Comprehensive Metabolic Panel                                               [facility]

                                                                                             

17:10 Order name: Comprehensive Metabolic Panel                                               [facility]

                                                                                             

17:10 Order name: Comprehensive Metabolic Panel                                               [facility]

                                                                                             

17:10 Order name: Comprehensive Metabolic Panel                                               [facility]

                                                                                             

19:15 Order name: Glucose, Ancillary Testing; Complete Time: 05:35                            [facility]

                                                                                             

19:51 Order name: Glucose                                                                     4 

                                                                                             

19:53 Order name: Lactate Sepsis 2 HR Follow-up; Complete Time: 05:35                         [facility]

                                                                                             

20:35 Order name: Glucose, Ancillary Testing; Complete Time: 05:35                            [facility]

                                                                                             

20:57 Order name: Glucose Level; Complete Time: 05:35                                         [facility]

                                                                                             

21:14 Order name: Glucose                                                                     4 

                                                                                             

21:27 Order name: Glucose, Ancillary Testing; Complete Time: 05:35                            [facility]

                                                                                             

21:45 Order name: Glucose Level; Complete Time: 05:35                                         [facility]

                                                                                             

22:21 Order name: Glucose                                                                     4 

                                                                                             

22:33 Order name: Glucose, Ancillary Testing; Complete Time: 05:35                            [facility]

                                                                                             

22:53 Order name: Glucose Level; Complete Time: 05:35                                         [facility]

                                                                                             

23:19 Order name: Glucose                                                                     4 

                                                                                             

23:30 Order name: Glucose, Ancillary Testing; Complete Time: 05:35                            [facility]

                                                                                             

00:04 Order name: Glucose Level; Complete Time: 05:35                                         [facility]

                                                                                             

00:12 Order name: Glucose                                                                     4 

                                                                                             

00:23 Order name: Glucose, Ancillary Testing; Complete Time: 05:35                            [facility]

                                                                                             

00:50 Order name: Glucose Level; Complete Time: 05:35                                         [facility]

                                                                                             

01:25 Order name: Glucose                                                                     jb4 

                                                                                             

01:36 Order name: Glucose, Ancillary Testing; Complete Time: 05:35                            [facility]

                                                                                             

02:16 Order name: Glucose Level; Complete Time: 05:35                                         [facility]

                                                                                             

02:42 Order name: Glucose, Ancillary Testing; Complete Time: 05:35                            [facility]

                                                                                             

02:45 Order name: Basic Metabolic Panel; Complete Time: 05:35                                 [facility]

                                                                                             

03:24 Order name: Glucose                                                                     4 

                                                                                             

03:36 Order name: Glucose, Ancillary Testing; Complete Time: 05:35                            [facility]

                                                                                             

03:53 Order name: Lactate w/ 2H reflex if indic.; Complete Time: 05:35                        [facility]

                                                                                             

04:08 Order name: Glucose Level; Complete Time: 05:35                                         [facility]

                                                                                             

04:19 Order name: Glucose                                                                     4 

                                                                                             

04:29 Order name: Glucose, Ancillary Testing; Complete Time: 05:35                            [facility]

                                                                                             

04:50 Order name: Glucose Level; Complete Time: 05:35                                         [facility]

                                                                                             

05:20 Order name: Glucose, Ancillary Testing; Complete Time: 05:35                            EDMS

                                                                                             

05:47 Order name: Lipid Profile                                                               [facility]

                                                                                             

06:21 Order name: Glucose, Ancillary Testing                                                  [facility]

                                                                                             

07:29 Order name: Glucose, Ancillary Testing                                                  [facility]

                                                                                             

09:01 Order name: Glucose, Ancillary Testing                                                  [facility]

                                                                                             

09:01 Order name: Glucose, Ancillary Testing                                                  [facility]

                                                                                             

09:21 Order name: Lactate Sepsis 2 HR Follow-up                                               [facility]

                                                                                             

13:03 Order name: IV Saline Lock; Complete Time: 14:04                                        [facility] 

                                                                                             

13:03 Order name: Labs collected and sent; Complete Time: 14:04                               [facility] 

                                                                                             

13:03 Order name: Urine Dipstick-Ancillary (obtain specimen); Complete Time: 13:30            [facility] 

                                                                                             

13:03 Order name: Glucose Level; Complete Time: 13:30                                         [facility] 

                                                                                             

15:59 Order name: Chest Single View XRAY; Complete Time: 16:37                                [facility] 

                                                                                             

17:10 Order name: CONS Physician Consult                                                      [facility]

                                                                                             

17:10 Order name: Clear Liquid                                                                [facility]

                                                                                             

18:54 Order name: Glucose Level; Complete Time: 23:41                                         [facility] 

                                                                                             

10:25 Order name: Glucose, Ancillary Testing                                                  [facility]

                                                                                             

11:23 Order name: Glucose, Ancillary Testing                                                  [facility]

                                                                                             

11:50 Order name: Gram Stain--Aerobic Bottle                                                  [facility]

                                                                                             

11:53 Order name: Gram Stain--Anaerobic Bottle                                                [facility]

                                                                                             

12:19 Order name: Glucose, Ancillary Testing                                                  [facility]

                                                                                             

13:17 Order name: Glucose, Ancillary Testing                                                  [facility]

                                                                                             

14:59 Order name: Glucose, Ancillary Testing                                                  [facility]

                                                                                             

15:51 Order name: Glucose, Ancillary Testing                                                  [facility]

                                                                                             

16:33 Order name: Basic Metabolic Panel                                                       [facility]

                                                                                             

17:12 Order name: Glucose, Ancillary Testing                                                  [facility]

                                                                                             

18:19 Order name: Glucose, Ancillary Testing                                                  [facility]

                                                                                             

19:45 Order name: Glucose, Ancillary Testing                                                  [facility]

                                                                                             

21:02 Order name: Gram Stain--Anaerobic Bottle                                                [facility]

                                                                                                  

Administered Medications:                                                                         

14:04 Drug: NS 0.9% 1000 ml Route: IV; Rate: 1 bolus; Site: left antecubital;                 ld1 

14:09 Drug: Dilaudid (HYDROmorphone) 0.5 mg Route: IVP; Site: left upper arm;                 ld1 

17:23 Follow up: Response: No adverse reaction                                                ld1 

15:40 Drug: NS 0.9% 1000 ml Route: IV; Rate: 1 bolus; Site: left upper arm;                   ld1 

17:23 Follow up: Response: No adverse reaction                                                ld1 

15:40 Drug: Dilaudid (HYDROmorphone) 1 mg Route: IVP; Site: left jugular;                     ld1 

16:33 Follow up: Response: No adverse reaction                                                ld1 

17:23 Follow up: Response: No adverse reaction                                                ld1 

16:13 Not Given (Physician Discretion): Dilaudid (HYDROmorphone) 0.5 mg IVP once              jr8 

16:55 Drug: Cefepime 1 grams Route: IVPB; Rate: 200 ml/hr; Infused Over: 30 mins; Site: left  ld1 

      upper arm;                                                                                  

17:23 Follow up: Response: No adverse reaction; IV Status: Completed infusion                 ld1 

17:25 Drug: NS 0.9% 1000 ml Route: IV; Rate: 1000 ml; Site: Other;                            ap3 

19:36 Not Given (charted in [facility] ): Insulin Drip - (Insulin Regular Human 100 units, NS   jh5 

      0.9% 100 ml) IV at calculated rate continuous; Standard concentration 1unit/ml; Dose        

      for DKA is 0.1 units/kg/hr                                                                  

                                                                                                  

                                                                                                  

Disposition:                                                                                      

19:05 Co-signature as Attending Physician, Siddharth MESSER was immediately available on-site ms3 

      in the Emergency Department for consultation in the care of the patient.                    

                                                                                                  

Disposition Summary:                                                                              

23 16:36                                                                                    

Hospitalization Ordered                                                                           

      Hospitalization Status: Inpatient Admission                                             jr8 

      Provider: Domingo Mc jr8 

      Condition: Fair                                                                         jr8 

      Problem: new                                                                            jr8 

      Symptoms: have improved                                                                 jr8 

      Bed/Room Type: Standard                                                                 [facility] 

      Location: Intensive Care Unit(23 20:00)                                           cg  

      Room Assignment: 6-(23 20:00)                                                     cg  

      Diagnosis                                                                                   

        - Diabetes mellitus due to underlying condition with ketoacidosis                     jr8 

        - Severe sepsis with septic shock                                                     jr8 

      Forms:                                                                                      

        - Medication Reconciliation Form                                                      jr8 

        - SBAR form                                                                           jr8 

Critical care time excluding procedures:                                                          

16:35 Critical care time: Bedside Care: 20 minutes, Consultation: 10 minutes. Total time: 30  jr8 

      minutes                                                                                     

                                                                                                  

Signatures:                                                                                       

Dispatcher [employer]                           EDSunday Whaley PA PA jr8                                                  

Vanessa Long, RN                       RN   cg                                                   

Nicole Mcclure RN                    RN   ap3                                                  

Siddharth Gonsalez DO DO   ms3                                                  

Suly Jang RN                     RN   ld1                                                  

Lupe Rainey RN                       RN   koShante Santos PA-C                     [facility]ILIAN sb4                                                  

Michelle Gilmore RN   jh5                                                  

                                                                                                  

Corrections: (The following items were deleted from the chart)                                    

18:55 16:36 Intensive Care Unit jr8                                                           cg  

18:55 16:36 jr8                                                                               cg  

                                                                                             

20:00  18:55 [facility] ER HOLD cg                                                             cg  

                                                                                             

20:00  18:55 ERHOLD- cg                                                                  cg  

                                                                                                  

**************************************************************************************************

## 2023-01-23 NOTE — XMS REPORT
Continuity of Care Document

                           Created on:2023



Patient:LAMBERT CLEMENTE REDD MADRIGAL

Sex:Male

:1985

External Reference #:[id number]





Demographics







                          Address                   [address]



                                                    [place] [address]

 

                          Home Phone                [phone]

 

                          Work Phone                [phone]

 

                          Mobile Phone              [phone]

 

                          Email Address             [email or web address]

 

                          Preferred Language        English

 

                          Marital Status            Unknown

 

                          Sabianism Affiliation     Unknown

 

                          Race                      Unknown

 

                          Additional Race(s)        Unavailable



                                                    Black or 

 

                          Ethnic Group              Unknown









Author







                          Organization              [facility]

t

 

                          Address                   [address]



                                                    [place] [address]

 

                          Phone                     [phone]









Support







                Name            Relationship    Address         Phone

 

                SABRINA VERDIN              [address]           [phone]



                                                [address] [id number] 

 

                SABRINA VERDIN  [place]              [address] [phone]



                                                [place] [id number] 

 

                one else per patient, No Unavailable     Unavailable     Unavail

able

 

                Redd Verdin  Unavailable     [address] [phone]



                                                [place] [address] 

 

                Cecelia Verdin Unavailable     Unavailable     [phone]

 

                Redd Verdin Jr Unavailable     [address]

 [phone]



                                                [place] [address] 

 

                PATIENT, NO ONE ELSE PER Unavailable     Unavailable     Unavail

able

 

                PHYILLIS        Grandparent     Unavailable     Unavailable

 

                SABRINA VERDIN M               [address]    Unavailabl

e



                                                [place] [id number] 

 

                Sabrina Bustillo Mother          [address]    [phone]



                                                [place] [id number] 

 

                YAZ, SMITA  Grandparent     Unavailable     [phone]

 

                REDD VERDIN  F               [address]    Unavailable



                                                [place] [address] 

 

                O'GREG, SMITA LAURA Grandparent     [address]       Unavailable



                                                [place] [address] 

 

                Laura O'Greg, Smita Grandparent     PO       [phone]



                                                [place] [id number] 

 

                Lambert Oliver, Redd Father          [address]    [phone]

[phone]



                                                [place] [id number] 

 

                Sabrina Verdin          [address] [phone]

[phone]



                                                [place] [address] 









Care Team Providers







                    Name                Role                Phone

 

                    Jesusita Domingo TEJEDA     Primary Care Physician [phone]

 

                    Domingo Mc       Attending Clinician Unavailable

 

                    MARY ANNE RAMIREZ             Attending Clinician Unavailable

 

                    MARY ANNE RAMIREZ             Attending Clinician Unavailable

 

                    HI HUYNH      Attending Clinician Unavailable

 

                    Hi Huynh MD   Attending Clinician [phone]

 

                    Doctor Unassigned, No Name Attending Clinician Unavailable

 

                    Lab, Ang - Db       Attending Clinician Unavailable

 

                    EDWARDO LOPEZ       Attending Clinician Unavailable

 

                    Candace LAWRENCE, Neeta NAM Attending Clinician [phone]

 

                    Edwardo Lopez DO    Attending Clinician [phone]

 

                    ALMA VILLASEÑOR    Attending Clinician Unavailable

 

                    Kasi FNALTHEA, Alma PAN Attending Clinician [phone]

 

                    ASHLEY GARAY    Attending Clinician Unavailable

 

                    Ashley Garay MD Attending Clinician [phone]

 

                    Dulce Guzman LVN Attending Clinician [phone]

 

                    RACHEL BAILEY      Attending Clinician Unavailable

 

                    Yoni Bangura Attending Clinician [phone]

 

                    Rachel Bailey MD   Attending Clinician [phone]

 

                    SPAPHIRE BONDS Attending Clinician Unavailable

 

                    Scooter COTTO, Yessica NELSON Attending Clinician Unavailable

 

                    Jessi Topete MD  Attending Clinician [phone]

 

                    Stephany Rodney RN Attending Clinician [phone]

 

                    SCOOBY SHARPE   Attending Clinician Unavailable

 

                    Thanh CLARKP, Yo B Attending Clinician [phone]

 

                    Jojo MEADOWS, Keenan Olmedo Attending Clinician [phone]

[phone]

 

                    An Chambers MD   Attending Clinician [phone]

 

                    Alan MEADOWS, Liz NAM  Attending Clinician [phone]

 

                    Scooby Sharpe MD Attending Clinician [phone]

 

                    Sheridan Delarosa MD Attending Clinician [phone]

 

                    Mac Key MD   Attending Clinician [phone]

 

                    Vamshi THEODOREM, Sapphire A Attending Clinician [phone]

[phone]

 

                    KAYLYNN LU      Attending Clinician Unavailable

 

                    Ashly Greene  Attending Clinician [phone]

 

                    Kaylynn Lu MD   Attending Clinician [phone]

 

                    RADHA MEADOWS Attending Clinician Unavailable

 

                    CHRIS SOL     Attending Clinician Unavailable

 

                    Alondra Santos MD Attending Clinician [phone]

 

                    Chris Sol DO  Attending Clinician [phone]

 

                    RADHA FOFANA     Attending Clinician Unavailable

 

                    Radha Fofana MD  Attending Clinician [phone]

 

                    TREY MEADOWS    Attending Clinician Unavailable

 

                    Noe Phillips MD     Attending Clinician [phone]

 

                    Trey Meadows MD Attending Clinician [phone]

 

                    Josse Alonso Attending Clinician [phone]

 

                    Shelton Pradhan Attending Clinician [phone]

 

                    DEAN SEGOVIA    Attending Clinician Unavailable

 

                    Tobias Hernandez MD  Attending Clinician [phone]

 

                    Efrain Myles MD Attending Clinician [phone]

 

                    Andre MEADOWS, Martha SMITH  Attending Clinician [phone]

 

                    Dean Segovia MD Attending Clinician Unavailable

 

                    RAND DONG      Attending Clinician Unavailable

 

                    Rand Dong MD   Attending Clinician [phone]

 

                    TREY FAIRCHILD Attending Clinician Unavailable

 

                    Trey Fairchild Attending Clinician [phone]

 

                    Bernardo De Souza Attending Clinician [phone]

 

                    ALLISON ROMEO     Attending Clinician Unavailable

 

                    ALLISON Carver Attending Clinician [phone]

 

                    NEETA MARIA    Attending Clinician Unavailable

 

                    Only, Ang Db Test   Attending Clinician Unavailable

 

                    Nicole Llanes MD     Attending Clinician [phone]

 

                    NICOLE LLANES        Attending Clinician Unavailable

 

                    Acacia Contreras Attending Clinician [phone]

 

                    ACACIA VILLA Attending Clinician Unavailable

 

                    MARTY RIVERS    Attending Clinician Unavailable

 

                    Deisy Linares   Attending Clinician [phone]

 

                    Deedee Reinoso   Attending Clinician [phone]

 

                    RAMU TORRES Attending Clinician Unavailable

 

                    EDWARDO LOPEZ       Admitting Clinician Unavailable

 

                    Edwardo Lopez DO    Admitting Clinician [phone]

 

                    ASHLEY AGRAY    Admitting Clinician Unavailable

 

                    RACHEL BAILEY      Admitting Clinician Unavailable

 

                    Rachel Bailey MD   Admitting Clinician [phone]

 

                    AN CHAMBERS      Admitting Clinician Unavailable

 

                    An Chambers MD   Admitting Clinician [phone]

 

                    KAYLYNN LU      Admitting Clinician Unavailable

 

                    Kaylynn Lu MD   Admitting Clinician [phone]

 

                    CHRIS SOL     Admitting Clinician Unavailable

 

                    TREY MEADOWS    Admitting Clinician Unavailable

 

                    Trey Meadows MD Admitting Clinician [phone]

 

                    TOBIAS HERNANDEZ     Admitting Clinician Unavailable

 

                    Tobias Hernandez MD  Admitting Clinician [phone]

 

                    RAND DONG      Admitting Clinician Unavailable

 

                    Rand Dong MD   Admitting Clinician [phone]

 

                    DEISY SUTTON Admitting Clinician Unavailable

 

                    Deisy Sutton Admitting Clinician [phone]

 

                    Bernardo De Souza Admitting Clinician [phone]

 

                    ALLISON ROMEO     Admitting Clinician Unavailable

 

                    ALMA VILLASEÑOR    Admitting Clinician Unavailable

 

                    NEETA MARIA    Admitting Clinician Unavailable

 

                    NURIA PEREIRA        Admitting Clinician Unavailable

 

                    Peter De Leon Admitting Clinician [phone]

 

                    Deedee Reinoso   Admitting Clinician [phone]

 

                    RAMU TORRES Admitting Clinician Unavailable









Payers







           Payer Name Policy Type Policy Number Effective Date Expiration Date S

cameron

 

           [employer]            [id number]  2021            



           PLUS                             00:00:00              

 

           [employer]            [id number]  2022            



                                            00:00:00              

 

           [facility]         [id number]                        Common



           (Medicaid)                                             [facility]         [id number]                        Common



           (Medicaid)                                             [facility]

 

           [employer]          [id number]                        Common



           (Medicaid)                                             [facility]

 

           [employer]          [id number]                        Common



           (Medicaid)                                             [facility]

 

           [employer]          [id number]                        Common



           (Medicaid)                                             [facility]







Problems







       Condition Condition Condition Status Onset  Resolution Last   Treating Co

mments 

Source



       Name   Details Category        Date   Date   Treatment Clinician        



                                                 Date                 

 

       Lactic Lactic Disease Active 2022                             [facility]



       acidosis acidosis               1-06                               ity of



                                   00:00:                             [place]



                                   00                                 Medical



                                                                      Branch

 

       Nausea and Nausea and Disease Active 2022                             U

nivers



       vomiting, vomiting,               0-21                               ity 

of



       unspecifie unspecifie               00:00:                             [facility]

[place]



       d vomiting d vomiting               00                                 Me

dical



       type   type                                                    Branch

 

       Chest pain Chest pain Disease Active                              U

nivers



                                   8-04                               ity of



                                   00:00:                             [place]



                                   00                                 Medical



                                                                      Branch

 

       Foot ulcer Foot ulcer Disease Active                       Overview

: Univers



       with fat with fat                                       Formattin ity

 of



       layer  layer                00:00:                      g of this [place]



       exposed, exposed,               00                          note   Medica

l



       left   left                                             might be Branch



                                                               different 



                                                               from the 



                                                               original. 



                                                               Added  



                                                               automatic 



                                                               ally from 



                                                               request 



                                                               for    



                                                               surgery 



                                                               [id number] 

 

       Right foot Right foot Disease Active                       Overview

: Univers



       ulcer, ulcer,               803                        Formattin ity of



       with fat with fat               00:00:                      g of this Eric

as



       layer  layer                00                          note   Medical



       exposed exposed                                           might be Branch



                                                               different 



                                                               from the 



                                                               original. 



                                                               Added  



                                                               automatic 



                                                               ally from 



                                                               request 



                                                               for    



                                                               surgery 



                                                               [id number] 

 

       Diabetic Diabetic Disease Active                              Unive

rs



       ketoacidos ketoacidos               7-26                               it

y of



       is without is without               00:00:                             Te

[place]



       coma   coma                 00                                 Medical



       associated associated                                                  Br

anch



       with type with type                                                  



       1 diabetes 1 diabetes                                                  



       mellitus mellitus                                                  

 

       Abdominal Abdominal Disease Active                              Uni

vers



       pain,  pain,                6-24                               ity of



       unspecifie unspecifie               00:00:                             Te

[place]



       d      d                    00                                 Medical



       abdominal abdominal                                                  Bran

ch



       location location                                                  

 

       Hyperglyce Hyperglyce Disease Active                              U

nivers



       jarvis    jarvis                  6-07                               ity of



                                   00:00:                             [place]



                                   00                                 Medical



                                                                      Branch

 

       Decubitus Decubitus Disease Active                              Uni

vers



       ulcer of ulcer of               6-05                               ity of



       heel,  heel,                00:00:                             [place]



       left,  left,                00                                 Medical



       unstageabl unstageabl                                                  Br

anch



       e      e                                                       

 

       Diabetic Diabetic Disease Active                              Unive

rs



       ketoacidos ketoacidos                                              it

y of



       is without is without               00:00:                             Te

[place]



       coma   coma                 00                                 Medical



       associated associated                                                  Br

anch



       with type with type                                                  



       2 diabetes 2 diabetes                                                  



       mellitus mellitus                                                  

 

       Decubitus Decubitus Disease Active                              Uni

vers



       ulcer of ulcer of                                              ity of



       heel,  heel,                00:00:                             [place]



       left,  left,                00                                 Medical



       unstageabl unstageabl                                                  Br

anch



       e      e                                                       

 

       Pyuria Pyuria Disease Active                              Univers



                                   605                               ity of



                                   00:00:                             [place]



                                   00                                 Medical



                                                                      Branch

 

       Chronic Chronic Disease Active                              Univers



       suprapubic suprapubic                                              it

y of



       catheter catheter               00:00:                             [place]



                                                                    Medical



                                                                      Branch

 

       Uncontroll Uncontroll Disease Active                              U

jeferson



       ed     ed                                                  ity of



       diabetes diabetes               00:00:                             [place]



       mellitus mellitus               00                                 Medica

l



       with   with                                                    Branch



       complicati complicati                                                  



       ons    ons                                                     

 

       Spina  Spina  Disease Recurre                              Univers



       bifida bifida        nce                                   ity of



                                   00:00:                             [place]



                                                                    Medical



                                                                      Branch

 

       Wound  Wound  Disease Active                              Univers



       infection infection               -11                               ity 

of



                                   00:00:                             [place]



                                   00                                 Medical



                                                                      Branch

 

       Chills Chills Disease Active                              Univers



                                   5-11                               ity of



                                   00:00:                             [place]



                                                                    Medical



                                                                      Branch

 

       POSSIBLE  POSSIBLE Diagnosis Active 2022             

  [facility]



        SHUNT  SHUNT                         21:48:00               l



       MALFUNCTIO MALFUNCTIO               00:00:                             Edson milan



       N      N Active               00                                 



              2022                                                  



               [facility]                                                  

 

        SHUNT    SHUNT Diagnosis Active 2022            

   [facility]



       MALFUNCTIO MALFUNCTIO               3-25          14:12:00               

l



       N      N Active               00:00:                             Jose



              2022                                 



               [facility] [place] [facility]                                                  

 

        SHUNT   SHUNT Diagnosis Active 2022             

  [facility]



       MALFUCTION MALFUCTION               3-          23:59:00               

l



               Active               00:00:                             Jose



              2022                                 



              [facility] [place] [facility]                                                  

 

       Complicate Complicate Disease Active                              U

nivemma



       d urinary d urinary               1-20                               ity 

of



       tract  tract                00:00:                             [place]



       infection infection               00                                 Medi

sarah



                                                                      Branch

 

       Hyperglyce Hyperglyce Disease Active                              C

[facility]



       jarvis    jarvis                  1-07                               [facility]



       without without               00:00:                             Medical



       ketosis ketosis               00                                 Center

 

       HEADACHE  HEADACHE Diagnosis Active 2018             

  [facility]



              Active                         22:05:00               l



              2018               00:00:                             Miguel malloy



              [address]                                                  

 

       SHUNT   SHUNT Diagnosis Active 2018               Mem

oria



       MALFUNCTIO MALFUNCTIO                         20:00:00               

l



       N      N Active               00:00:                             Jose



              2018               00                                 



              [facility]                                                  

 

       ACUTE   ACUTE Diagnosis Active 2018               Mem

oria



       HEADACHE HEADACHE                         09:20:00               l



              Active               00:00:                             [place]



              2018               00                                 



               [facility]                                                  

 

       Spina  Spina  Disease Active                              [facility]



       bifida bifida                                              [facility]



                                   [facility]                             [facility]



                                   00                                 [facility]

 

       Pyelonephr Pyelonephr Disease Active                              [facility]

[facility]



       itis   itis                                                [facility]



                                   [facility]                             [facility]



                                   00                                 Center

 

       Morbid Morbid Disease Active                              [facility]



       obesity obesity               1-04                               ity of



       with body with body               00:00:                             Mariana

s



       mass index mass index               00                                 Me

dical



       of     of                                                      [facility]



       40.0-49.9 40.0-49.9                                                  

 

       Lumbar        Problem Active                                    Common



       spina                                                          [facility]



       bifida                                                         - [facility]



       with                                                           [facility]

 

       Dependence        Problem Active                                    Commo

n



       on                                                             [facility]



       wheelchair                                                         [facility]

 

       Paraplegia        Problem Active                                    Commo

n



                                                                      [facility]



                                                                      [facility]

 

       Constipati        Problem Active                                    Commo

n



       on                                                             [facility]



                                                                      [facility]

 

       Incontinen        Problem Active                                    Commo

n



       ce of                                                          [facility]



       urine                                                          - [facility]

 

       Nausea        Problem Active                                    Common



                                                                      [facility]

 

       Mixed         Problem Active                                    Common



       anxiety                                                         [facility]



       and                                                            - [facility]



       depressive                                                         [facility]

 

       [id number]        Problem Active                                    Common



                                                                      [facility]



                                                                      [facility]

 

       Bowel         Problem Active                                    Common



       incontinen                                                         [facility]



       ce                                                             - [facility]

 

       [id number]        Problem Active                                    Common



                                                                      [facility]



                                                                      [facility]

 

       [id number]        Problem Active                                    Common



                                                                      [facility]



                                                                      [facility]

 

       [id number]        Problem Active                                    Common



                                                                      [facility]



                                                                      [facility]

 

       [id number]        Problem Active                                    Commo

n



       [id number]                                                         [facility]



                                                                      [facility]

 

       Foot ulcer        Problem Active                                    Commo

n



                                                                      [facility]



                                                                      [facility]

 

       Myelocele        Problem Active                                    Common



       with                                                           [facility]



       hydrocepha                                                         - [facility]



       ozzy                                                            [facility]

 

       [id number]        Problem Active                                    Common



                                                                      [facility]

 

       [id number]        Problem Active                                    Common



                                                                      [facility]

 

       Nicotine        Problem Active                                    Common



       dependence                                                         [facility]

 

       [id number]        Problem Active                                    Common



                                                                      [facility]

 

       [id number]        Problem Active                                    Common



                                                                      [facility]

 

       [id number]        Problem Active                                    Common



                                                                      [facility]

 

       [id number]        Problem Active                                    Common



                                                                      [facility]

 

       Spina   Spina Problem                      2019               Memor

ia



       bifida, bifida,                             14:14:02               l



       unspecifie unspecifie                                                  He

rmann



       d      d                                                       



              2019                                                  



              [facility]                                                  

 

       Nausea  Nausea Problem                      2019               [facility]

rajeev



       with   with                               14:14:02               l



       vomiting, vomiting,                                                  Herm

jorge



       unspecifie unspecifie                                                  



       d      d                                                       



              2019                                                  



              [facility]                                                  

 

       Diplopia  Diplopia Problem                      2019               

[facility]



              2019                             14:14:02               l



              [facility]                                                  

 

       Cerebral  Cerebral Problem                      2019               

[facility]



       palsy, palsy,                             14:14:02               l



       unspecifie unspecifie                                                  He

rmann



       d      d                                                       



              2019                                                  



              [facility]                                                  

 

       Acquired  Acquired Problem                      2019               

[facility]



       absence of absence of                             14:14:02               

l



       other  other                                                   Jose



       specified specified                                                  



       parts of parts of                                                  



       digestive digestive                                                  



       tract  tract                                                   



              2019                                                  



              [facility]                                                  

 

       Nicotine  Nicotine Problem                      2019               

[facility]



       dependence dependence                             14:14:02               

l



       ,      ,                                                       Jose



       cigarettes cigarettes                                                  



       ,      ,                                                       



       uncomplica uncomplica                                                  



       huma    huma                                                     



              2019                                                  



              [facility]                                                  

 

       Presence   Presence Problem                      2019              

 [facility]



       of     of                                 14:14:02               l



       cerebrospi cerebrospi                                                  He

rmann



       nal fluid nal fluid                                                  



       drainage drainage                                                  



       device device                                                  



              2019                                                  



               [facility]                                                  

 

       Allergy  Allergy Problem                      2019               Me

moria



       status to status to                             14:14:02               l



       other  other                                                   Jose



       antibiotic antibiotic                                                  



       agents agents                                                  



       status status                                                  



              2019                                                  



              [facility]                                                  

 

       Allergy  Allergy Problem                      2019               Me

moria



       status to status to                             14:14:02               l



       other  other                                                   Jose



       drugs, drugs,                                                  



       medicament medicament                                                  



       s and  s and                                                   



       biological biological                                                  



       substances substances                                                  



       status status                                                  



              2019                                                  



               [facility]                                                  

 

       Allergy  Allergy Problem                      2019               Me

moria



       status to status to                             14:14:02               l



       narcotic narcotic                                                  Miguel malloy



       agent  agent                                                   



       status status                                                  



              2019                                                  



              [facility]                                                  

 

       Asthma  Asthma Problem Resolve               2022-04-15               Mem

oria



       (disorder) (disorder)        d                    23:48:47               

l



              Resolved                                                  Jose



              Problem                                                  



              04/15/2022                                                  



              [facility]                                                  

 

       Bronchitis  Bronchiti Problem Resolve               2022-04-15           

    [facility]



       (disorder) s             d                    23:48:47               l



              (disorder)                                                  Miguel

n



              Resolved                                                  



              Problem                                                  



              04/15/2022                                                  



              [facility]                                                  

 

       Cerebral   Cerebral Problem Resolve               2022-04-15             

  [facility]



       palsy  palsy         d                    23:48:47               l



       (disorder) (disorder)                                                  He

rmann



              Resolved                                                  



              Problem                                                  



              04/15/2022                                                  



               [facility]                                                  

 

       Hydrocepha  Hydroceph Problem Resolve               2022-04-15           

    [facility]



       ozzy    alus          d                    23:48:47               l



       (disorder) (disorder)                                                  He

rmann



              Resolved                                                  



              Problem                                                  



              04/15/2022                                                  



              [facility]                                                  

 

       Osteomyeli  Osteomyel Problem Resolve               2022-04-15           

    [facility]



       tis    itis          d                    23:48:47               l



       (disorder) (disorder)                                                  He

rmann



              Resolved                                                  



              Problem                                                  



              04/15/2022                                                  



              [facility]                                                  

 

       Acute pain  Acute Problem Active               2022-04-15               M

emoria



       (finding) pain                               23:48:47               l



              (finding)                                                  [place]



              Active                                                  



              Problem                                                  



              04/15/2022                                                  



              [facility]                                                  

 

       Morbid  Morbid Problem Active               2022-04-15               [facility]

rajeev



       obesity obesity                             23:48:47               l



       (disorder) (disorder)                                                  He

rmann



              Active                                                  



              Problem                                                  



              04/15/2022                                                  



              [facility]                                                  

 

       Providenci        Problem Active               2022-04-15               M

emoria



       a      Providenci                             23:48:47               l



       (organism) a                                                       Miguel

n



              (organism)                                                  



              Active                                                  



              Problem                                                  



              04/15/2022                                                  



              Problem                                                  



              added by                                                  



              Discern                                                  



              Expert. [facility]                                                  







History of Past Illness







       Condition Condition Condition Status Onset  Resolution Last   Treating Co

mments 

Source



       Name   Details Category        Date   Date   Treatment Clinician        



                                                 Date                 

 

       Headache  Headache Problem        2019           

    [facility]



              2018               1-08   14:14:02 14:14:02               l



              2019               06:00:                             Miguel malloy



               [address]                                                  







Allergies, Adverse Reactions, Alerts







       Allergy Allergy Status Severity Reaction(s) Onset  Inactive Treating Comm

ents 

Source



       Name   Type                        Date   Date   Clinician        

 

       Amoxicil Propensi Active        Hives  2018-0                      [facility]

s



       bryn    ty to                                               ity of



              adverse                      00:00:                      [place]



              reaction                      00                          Medical



              s                                                       [facility]anch

 

       AMOXICIL DRUG   Active        Hives  2018-0                      [facility]



       BRYN    INGREDI                                              ity of



                                          00:00:                      [place]



                                          00                          Medical



                                                                      [facility]

 

       Metoclop Propensi Active        Nausea 2017                      [facility]

s



       ramide ty to                and/or 2-20                        ity of



       Hcl    adverse               Vomiting 00:00:                      [place]



              reaction                      00                          Medical



              s                                                       Branch

 

       METOCLOP DRUG   Active        N/V    2017-                      [facility]



       RAMIDE INGREDI                      2-20                        ity of



       HCL                                00:00:                      [place]



                                          00                          Medical



                                                                      Branch

 

       Sulfamet Propensi Active        Hives  2017-0                      [facility]



       hoxazole ty to                       6-11                        [facility]



       [facility] adverse                      00:00:                      [facility]



       oprim  reaction                      00                          Center



              s                                                       

 

       Levoflox Propensi Active        Hives  2017-0                      [facility]



       acin   ty to                       6-11                        [facility]



              adverse                      00:00:                      Medical



              reaction                      00                          Center



              s                                                       

 

       Morphine Propensi Active        Hives  2017-0                      [facility]



              ty to                       6-11                        [facility]



              adverse                      00:00:                      Medical



              reaction                      00                          Center



              s                                                       

 

       Sesame Propensi Active        Hives  2017-0                      [facility]



       Seed   ty to                       6-11                        [facility]



              adverse                      00:00:                      Medical



              reaction                      00                          Center



              s                                                       

 

       Ketorola Propensi Active        Rash   2017-0                      [facility]



       c      ty to                       6-11                        [facility]



              adverse                      00:00:                      Medical



              reaction                      00                          Center



              s                                                       

 

       Vancomyc Propensi Active        Rash   2017-0                      [facility]



       in     ty to                       6-11                        [facility]



       [employer] adverse                      00:00:                      Medical



       s      reaction                      00                          Center



              s                                                       

 

       Ondanset Propensi Active        Nausea And 2017-0                      [facility]

[facility]



       evin Hcl ty to                Vomiting 6-11                        [facility]



       (Pf)   adverse                      00:00:                      Medical



              reaction                      00                          Center



              s                                                       

 

       SULFAMET Allergy Active High   Hives  2017-0                      [facility]



       HOXAZOLE                             6-11                        [facility]



       [facility]                             00:00:                      Medical



       OPRIM                              00                          Center

 

       LEVOFLOX Allergy Active High   Hives  2017-0                      [facility]



       ACIN                               6-11                        [facility]



                                          00:00:                      Medical



                                          00                          Center

 

       MORPHINE Allergy Active High   Hives  2017-0                      [facility]



                                          [facility]11                        [facility]



                                          [facility]                      [facility]



                                          00                          Center

 

       KETOROLA Allergy Active High   Rash   2017-0                      [facility]



       C                                  6-11                        [facility]



                                          [facility]                      [facility]



                                          00                          Center

 

       VANCOMYC Allergy Active High   Rash   2017-0                      [facility]



       IN                                 6-11                        [facility]



       ANALOGUE                             00:00:                      Medical



       S                                  00                          Center

 

       SESAME Allergy Active        Hives                        [facility]



       SEED                               6-11                        [facility]



                                          00[facility]                      [facility]



                                          00                          Center

 

       ONDANSET Allergy Active        N\T\V                        [facility]



       EVIN HCL                             6-11                        [facility]



       (PF)                               00:00:                      Medical



                                          00                          Center

 

       Sulfa  Propensi Active        Other - See                       Uni

vers



       (Sulfona ty to                comments 1-04                        ity of



       mide   adverse                      00:00:                      [place]



       Antibiot reaction                      00                          Medica

l



       ics)   s                                                       Branch

 

       Sulfamet Propensi Active        Other - See                       U

nivers



       hoprim ty to                comments                         ity of



       Ds     adverse                      00:00:                      [place]



              reaction                      00                          Medical



              s                                                       Branch

 

       Vancomyc Propensi Active        Other - See                       U

nivers



       in     ty to                comments                         ity of



              adverse                      00:00:                      [place]



              reaction                      00                          Medical



              s                                                       Branch

 

       Sulfa  Propensi Active        Other - See                       Uni

vers



       (Sulfona ty to                comments 1                        ity of



       mide   adverse                      00:00:                      [place]



       Antibiot reaction                      00                          Medica

l



       ics)   s                                                       Branch

 

       SULFA  Drug   Active        Other-Cmnt                       Univer

s



       (SULFONA Class                       1-04                        ity of



       MIDE                               00:00:                      [place]



       ANTIBIOT                             00                          Medical



       ICS)                                                           Branch

 

       SULFAMET DRUG   Active        Other-Cmnt                       Univ

ers



       HOPRIM                             1-                        ity of



       DS                                 00:00:                      [place]



                                                                    Medical



                                                                      Branch

 

       VANCOMYC DRUG   Active        Other-Cmnt                       Univ

ers



       IN     INGREDI                      1-04                        ity of



                                          00:00:                      [place]



                                                                    Medical



                                                                      Branch

 

       Sulfamet Propensi Active        Rash                         Univer

s



       hoxazole ty to                       7-                        ity of



              adverse                      00:00:                      [place]



              reaction                      00                          Medical



              s                                                       Branch

 

       Ondanset Propensi Active        Nausea                       Univer

s



       evin Hcl ty to                and/or                         ity of



       (Pf)   adverse               Vomiting 00:00:                      [place]



              reaction                      00                          Medical



              s                                                       Branch

 

       SULFAMET DRUG   Active        Rash                         Univers



       HOXAZOLE INGREDI                      7-                        ity of



                                          00:00:                      [place]



                                                                    Medical



                                                                      Branch

 

       ONDANSET DRUG   Active        N/V                          Univers



       EVIN HCL                             7-19                        ity of



       (PF)                               00:00:                      [place]



                                          00                          Medical



                                                                      Branch

 

       Levoflox Propensi Active        Hives                        Univer

s



       acin   ty to                                               ity of



              adverse                      00:00:                      [place]



              reaction                      00                          Medical



              s                                                       Branch

 

       Morphine Propensi Active        Hives  -                      Univer

s



              ty to                                               ity of



              adverse                      00:00:                      [place]



              reaction                      00                          Medical



              s                                                       Branch

 

       Ketorola Propensi Active        Nausea -                      Univer

s



       c      ty to                and/or                         ity of



       Trometha adverse               Vomiting 00:00:                      [place]



       mine   reaction                      00                          Medical



              s                                                       Branch

 

       LEVOFLOX DRUG   Active        Hives                        Univers



       ACIN   INGREDI                                              ity of



                                          00:00:                      [place]



                                          00                          Medical



                                                                      Branch

 

       MORPHINE DRUG   Active        Hives                        [facility]



              INGREDI                                              ity of



                                          00:00:                      [place]



                                          00                          Medical



                                                                      Branch

 

       KETOROLA DRUG   Active        N/V    -                      Univers



       C      INGREDI                                              ity of



       TROMETHA                             00:00:                      [place]



       MINE                               00                          Medical



                                                                      Branch

 

       morphine Drug   Active                                           Common



              allergy                                                  [facility]

 

       ondanset Drug   Active                                           Common



       evin    allergy                                                  [facility]

 

       [address]56  Drug   Active                                           Common



              allergy                                                  [facility]

 

       amoxicil amoxicil Active                                           Mem[facility]

a



       bryn    bryn                                                     l



                                                                      [place]

 

       morphine morphine Active                                           [facility]

a



                                                                      l



                                                                      [place]

 

       Toradol Toradol Active                                           [facility]



                                                                      l



                                                                      [place]

 

       Minocin Minocin Active                                           [facility]



                                                                      l



                                                                      [place]

 

       Zofran Zofran Active                                           [facility]



                                                                      l



                                                                      [facility]

 

       Levaquin Levaquin Active                                           [facility]

a



                                                                      l



                                                                      [place]

 

       Bactrim Bactrim Active                                           [facility]



                                                                      l



                                                                      [facility]

 

       Reglan Reglan Active                                           [facility]



                                                                      l



                                                                      [place]







Family History







           Family Member Diagnosis  Comments   Start Date Stop Date  Source

 

           Natural father Diabetes                                    [facility]

 

           Natural mother No Significant                                  Univer

sity of [place]



                      Medical Problems                                  Medical 

Branch







Social History







           Social Habit Start Date Stop Date  Quantity   Comments   Source

 

           History [facility] o

f



           Alcohol Frequency                                             [facility]

edical



                                                                  Branch

 

           History [facility] o

f



           Alcohol Std Drinks                                             [place] 

Medical



                                                                  Branch

 

           History [facility] o

f



           Alcohol Binge                                             [place] [facility]

al



                                                                  Branch

 

           History of tobacco                       Cigarette Smoker            

[facility]



           use                                                    [facility]

 

           Alcohol intake 2022 Current drinker            Unive

rsity of



                      00:00:00   00:00:00   of alcohol            [facility]



                                            (finding)             Branch

 

           Exposure to 2022 Not sure              [facility]



           SARS-CoV-2 (event) 00:00:00   12:04:00                         [facility]



                                                                  Branch

 

           Education  2022 21                    [facility]



                      00:00:00   00:00:00                         [facility]

 

           Tobacco use and 2022-10-21 2022-10-21 Smokeless             Universit

y of



           exposure   00:00:00   00:00:00   tobacco non-user            [facility]

 

           Alcohol Comment 2022 once a year            Universi

ty of



                      00:00:00   00:00:00                         [facility]

 

           Social History 2022                       [facility]



                      05:57:00   05:57:00                         

 

           Cigarettes smoked 2017                       FRANCINE Dubois



           current (pack per 00:00:00   00:00:00                         Medical

 Center



           day) - Reported                                             

 

           Sex Assigned At 1985                       FRANCINE Rivass



           Birth      00:00:00   00:00:00                         [facility]









                Smoking Status  Start Date      Stop Date       Source

 

                Social History  2018 11:26:10                 [facility]

[facility]







Medications







       Ordered Filled Start  Stop   Current Ordering Indication Dosage Frequency

 Signature

                    Comments            Components          Source



     Medication Medication Date Date Medication? Clinician                (SIG) 

          



     Name Name                                                   

 

     lisinopriL            Yes       [id number] 2.5mg      Take 1           

Univers



     2.5 mg      1-01                               tablet by           ity of



     tablet      00:00:                               mouth in           [place]



               00                                 the            Medical



                                                  morning.           Branch

 

     lisinopriL            Yes       [id number] 2.5mg      Take 1           

Univers



     2.5 mg      1-01                               tablet by           ity of



     tablet      00:00:                               mouth in           [place]



               00                                 the            Medical



                                                  morning.           [place]

 

     Nitrofurant      2022- Yes            100mg      100 mg,           U

nivers



     oin&Nit.      13                          Oral, BID,           ity 

of



     Macrocryst      02:00: 01:59                          10 doses,           T

exas



     (MACROBID)      00   :00                           First dose           Med

ical



     100 mg                                         on Mon           Branch



     capsule 100                                         22 at           



     mg                                           2000, Last           



                                                  dose on           



                                                  Sat            



                                                  22           



                                                  at 0800,           



                                                  Routine<br           



                                                  >Reason           



                                                  for            



                                                  Anti-Infec           



                                                  tive:           



                                                  Empiric           



                                                  Therapy           



                                                  for            



                                                  Suspected           



                                                  Infection<           



                                                  br>Empiric           



                                                  Therapy           



                                                  Site:           



                                                  Urine<br>D           



                                                  uration of           



                                                  therapy:           



                                                  72 hours           

 

     ceFEPIme      2022- No             1000mg      1,000 mg,           U

nivers



     (MAXIPIME)      07                          IV             ity of



     1,000 mg in      21:15: 21:34                          [place], [place]



     NaCl 0.9%      00   :48                           ONCE, 1           Medical



     (NS) 50 mL                                         dose, On           Bran

h



     MINI-BAG                                         Mon            



                                                  22 at           



                                                  1515,           



                                                  Administer           



                                                  over 30           



                                                  Minutes,           



                                                  50             



                                                  mL<br>Reas           



                                                  on for           



                                                  Anti-Infec           



                                                  tive:           



                                                  Documented           



                                                  Infection<           



                                                  br>Documen           



                                                  huma            



                                                  Infection           



                                                  Site:           



                                                  Urine<br&g           



                                                  t;Duration           



                                                  of             



                                                  Therapy: 7           



                                                  days           

 

     HYDROmorpho      2022-1      Yes            4mg       Take 4 mg           U

nivers



     ne 4 mg      1-07                               by mouth 3           ity of



     tablet      17:55:                               (three)           [place]



               40                                 times           Medical



                                                  daily as           Branch



                                                  needed.           

 

     HYDROmorpho      2022-1      Yes            4mg       Take 4 mg           U

nivers



     ne 4 mg      1-07                               by mouth 3           ity of



     tablet      17:55:                               (three)           [place]



               40                                 times           Medical



                                                  daily as           Branch



                                                  needed.           

 

     HYDROmorpho      2022-1      Yes            4mg       Take 4 mg           U

nivers



     ne 4 mg      1-07                               by mouth 3           ity of



     tablet      17:55:                               (three)           [place]



               40                                 times           Medical



                                                  daily as           Branch



                                                  needed.           

 

     HYDROmorpho      2-1      Yes            4mg       Take 4 mg           U

nivers



     ne 4 mg      1-07                               by mouth 3           ity of



     tablet      17:55:                               (three)           [place]



               40                                 times           Medical



                                                  daily as           Branch



                                                  needed.           

 

     HYDROmorpho      2-1      Yes            4mg       Take 4 mg           U

nivers



     ne 4 mg      1-07                               by mouth 3           ity of



     tablet      17:55:                               (three)           [place]



               40                                 times           Medical



                                                  daily as           Branch



                                                  needed.           

 

     HYDROmorpho      2-1      Yes            4mg       Take 4 mg           U

nivers



     ne 4 mg      1-07                               by mouth 3           ity of



     tablet      17:55:                               (three)           [place]



               40                                 times           Medical



                                                  daily as           Branch



                                                  needed.           

 

     HYDROmorpho      2022-1      Yes            4mg       Take 4 mg           U

nivers



     ne 4 mg      1-07                               by mouth 3           ity of



     tablet      17:55:                               (three)           [place]



               40                                 times           Medical



                                                  daily as           Branch



                                                  needed.           

 

     HYDROmorpho      2022-1      Yes            4mg       Take 4 mg           U

nivers



     ne 4 mg      1-07                               by mouth 3           ity of



     tablet      17:55:                               (three)           [place]



               40                                 times           Medical



                                                  daily as           Branch



                                                  needed.           

 

     HYDROmorpho      2022-1      Yes            4mg       Take 4 mg           U

nivers



     ne 4 mg      1-07                               by mouth 3           ity of



     tablet      17:55:                               (three)           [place]



               40                                 times           Medical



                                                  daily as           Branch



                                                  needed.           

 

     HYDROmorpho      2022-1      Yes            4mg       Take 4 mg           U

nivers



     ne 4 mg                                     by mouth 3           ity of



     tablet      17:55:                               (three)           [place]



               40                                 times           Medical



                                                  daily as           Branch



                                                  needed.           

 

     enoxaparin      2022      Yes            40mg      40 mg,           Unive

rs



     (LOVENOX)                                     Subcutaneo           ity 

of



     injection      15:00:                               us, DAILY,           [facility]

[place]



     40 mg      00                                 First dose           Medical



                                                  on Mon           Branch



                                                  22 at           



                                                  0900,           



                                                  Until           



                                                  Discontinu           



                                                  ed,            



                                                  Routine           

 

     Sliding      2022      Yes                      Subcutaneo           Univ

ers



     Scale      07                               us, TID           ity of



     Insulin -      03:00:                               MEALS+HS,           Eric

as



     Lispro      00                                 First dose           Medical



     (HumaLOG) +                                         on [facility]



     Fsbg                                         22 at           



     Testing                                         2100,           



                                                  Until           



                                                  Discontinu           



                                                  ed,            



                                                  Routine           

 

     FENTanyl PF      2022- No             75ug      75 mcg,           Un

[facility]



     (SUBLIMAZE                                Slow IV           ity o

f



     (PF))      01:15: 00:23                          Push,           [place]



     injection      00   :00                           ONCE, 1           Medical



     75 mcg                                         dose, On           [facility]            



                                                  22 at           



                                                  1915,           



                                                  Routine           

 

     dextrose      2022      Yes            250mL      250 mL, IV           Un

[facility]



     10% (D10W)                                     Infusion,           ity 

of



     bolus      01:12:                               PRN - SEE           [place]



     infusion      23                                 INSTRUCTIO           Medic

al



     250 mL                                         NS,            Branch



                                                  Administer           



                                                  over 60           



                                                  Minutes,           



                                                  Other, If           



                                                  blood           



                                                  glucose is           



                                                  &amp;lt;           



                                                  or = 70           



                                                  mg/dL and           



                                                  patient is           



                                                  unable to           



                                                  swallow or           



                                                  has mental           



                                                  status           



                                                  changes,           



                                                  Starting           



                                                  on Sun           



                                                  22 at           



                                                  1912<br>If           



                                                  blood           



                                                  glucose is           



                                                  < or = 70           



                                                  mg/dL and           



                                                  patient is           



                                                  unable to           



                                                  swallow or           



                                                  has mental           



                                                  status           



                                                  changes           



                                                  (Give           



                                                  glucagon           



                                                  order if           



                                                  patient           



                                                  needs           



                                                  fluid           



                                                  restrictio           



                                                  n):            



                                                  &nbsp;IF           



                                                  IV access           



                                                  available:           



                                                  Dextrose           



                                                  10%.           



                                                  &nbsp;1.           



                                                  125 mL (?           



                                                  bag) of           



                                                  D10W IV           



                                                  infusion -           



                                                  equivalent           



                                                  to 12.5 g           



                                                  dextrose           



                                                  &nbsp;2.           



                                                  Blood           



                                                  glucose -           



                                                  draw blood           



                                                  glucose 15           



                                                  minutes           



                                                  after D10W           



                                                  Administra           



                                                  tion.           



                                                  &amp;nbsp;           



                                                  3. If           



                                                  blood           



                                                  glucose is           



                                                  < 80           



                                                  mg/dL,           



                                                  repeat.<br           



                                                  >              

 

     glucagon      2022      Yes            1mg       1 mg,           [facility]



     (GLUCAGEN                                     Intramuscu           ity 

of



     DIAGNOSTIC      01:12:                               lar, PRN,           Te

[facility]



     KIT)      20                                 Starting           Medical



     injection 1                                         on Sun           Branch



     mg                                           22 at           



                                                  1912,           



                                                  Until           



                                                  Discontinu           



                                                  ed, ASAP,           



                                                  Blood           



                                                  Glucose           



                                                  &amp;lt;           



                                                  or = 70           



                                                  mg/dL and           



                                                  patient is           



                                                  unable to           



                                                  swallow or           



                                                  has mental           



                                                  changes.           

 

     HYDROmorpho      2022      Yes            4mg       4 mg,           Unive

rs



     ne                                       Oral,           ity of



     (DILAUDID)      01:08:                               Q6HPRN,           [place]

[place]



     tablet 4 mg      42                                 Starting           Medi

sar[place]



                                                  on Sun           Branch



                                                  22 at           



                                                  1908,           



                                                  Until           



                                                  Discontinu           



                                                  ed,            



                                                  Routine,           



                                                  Pain           



                                                  (scale           



                                                  7-10)           

 

     proMETHazin      2022      Yes            12.5mg      12.5 mg,           

[facility]



     e                                        Oral,           ity of



     (PHENERGAN)      01:06:                               Q4HPRN,           Eric

as



     tablet 12.5      57                                 Starting           Medi

sar[facility]



     mg                                           on Sun           Branch



                                                  22 at           



                                                  1906,           



                                                  Until           



                                                  Discontinu           



                                                  ed,            



                                                  Routine,           



                                                  Nausea and           



                                                  Vomiting           



                                                  (N/V)           

 

     acetaminoph      2022      Yes            650mg      650 mg,           Un

[facility]



     en                                       Oral,           ity of



     (TYLENOL)      01:05:                               Q6HPRN,           [place]



     tablet 650      57                                 Starting           Medic

al



     mg                                           on Sun           Branch



                                                  22 at           



                                                  1905,           



                                                  Until           



                                                  Discontinu           



                                                  ed,            



                                                  Routine,           



                                                  Pain           



                                                  (scale           



                                                  1-3)           

 

     NaCl 0.9%      2022- No             1000mL      at 999           Uni

vers



     (NS) bolus      07                          mL/hr,           ity of



     infusion      00:30: 00:35                          1,000 mL,           Eric

as



     1,000 mL      00   :00                           IV             Medical



                                                  Infusion,           Branch



                                                  ONCE, 1           



                                                  dose, On           



                                                  Sun            



                                                  22 at           



                                                  1830, STAT           

 

     Nitrofurant      2022- Yes       [id number] 100mg      Take 1         

  [facility]



     oin&Nit.       11-15                          capsule by           ity 

of



     Macrocryst      00:00: 05:59                          mouth in           Te

[facility]



     100 mg      00   :00                           the            Medical



     capsule                                         morning           Branch



                                                  and 1           



                                                  capsule in           



                                                  the            



                                                  evening.           



                                                  Do all           



                                                  this for 7           



                                                  days.           

 

     NaCl 0.9%      2022- No             1000mL      at 999           Uni

vers



     (NS) bolus      -07                          mL/hr,           ity of



     infusion      23:45: 00:36                          1,000 mL,           Eric

as



     1,000 mL      00   :00                           IV             Medical



                                                  Infusion,           Branch



                                                  ONCE, 1           



                                                  dose, On           



                                                  Sun            



                                                  22 at           



                                                  1745, ASAP           

 

     FENTanyl PF      2022 No             75ug      75 mcg,           Un

[facility]



     (SUBLIMAZE      1-06 11-06                          Slow IV           ity o

f



     (PF))      23:45: 23:19                          Push,           [place]



     injection      00   :00                           ONCE, 1           Medical



     75 mcg                                         dose, On           Branch



                                                  Sun            



                                                  22 at           



                                                  1745,           



                                                  Routine           

 

     iopamidol      2022 No        [id number] 85mL      85 mL,           

[facility]



     (ISOVUE      0-30 10-30                          Intravenou           ity o

f



     370-500 mL)      03:00: 02:09                          s, ONCE, 1          

 [place]



     injection      00   :00                           dose, On           Medica

l



     85 mL                                         Sat            Branch



                                                  10/29/22           



                                                  at 2200,           



                                                  Routine           

 

     FENTanyl PF      2022 No             50ug      50 mcg,           Un

[facility]



     (SUBLIMAZE      0-30 10-30                          Slow IV           ity o

f



     (PF))      02:45: 01:47                          Push,           [place]



     injection      00   :00                           ONCE, 1           Medical



     50 mcg                                         dose, On           Branch



                                                  Sat            



                                                  10/29/22           



                                                  at 2145,           



                                                  Routine           

 

     cefdinir      2022 No             300mg      300 mg,           Univ

ers



     (OMNICEF)      0-30 10-30                          Oral,           ity of



     capsule 300      02:15: 03:14                          ONCE, 1           Te

[place]



     mg        00   :00                           dose, On           Medical



                                                  Sat            Branch



                                                  10/29/22           



                                                  at 2115,           



                                                  ASAP<br>Re           



                                                  ason for           



                                                  Anti-Infec           



                                                  tive:           



                                                  Documented           



                                                  Infection<           



                                                  br>Documen           



                                                  huma            



                                                  Infection           



                                                  Site:           



                                                  Urine<br>D           



                                                  uration of           



                                                  Therapy:           



                                                  Other (see           



                                                  Comments)           

 

     proMETHazin      2022- No             25mg      25 mg, IV           

[facility]



     e         0-30 10-30                          Piggyback,           ity of



     (PHENERGAN)      01:45: 01:47                          ONCE, 1           Te

[place]



     25 mg in      00   :00                           dose, On           Medical



     NaCl 0.9%                                         Sat            Branch



     (NS) 50 mL                                         10/29/22           



     IV                                           at 2045,           



     piggyback                                         ASAP           

 

     cefdinir      2022      Yes       [id number] 300mg      Take 1           Un

[facility]



     300 mg      0-29                               capsule by           ity of



     capsule      00:00:                               mouth           [place]



               00                                 every 12           Medical



                                                  (twelve)           Branch



                                                  hours.           

 

     cefdinir      2022      Yes       [id number] 300mg      Take 1           Un

[facility]



     300 mg      0-29                               capsule by           ity of



     capsule      00:00:                               mouth           [place]



               00                                 every 12           Medical



                                                  (twelve)           Branch



                                                  hours.           

 

     cefdinir      2022- No        [id number] 300mg      Take 1           U

nivers



     300 mg      0-29 11-07                          capsule by           ity of



     capsule      00:00: 00:00                          mouth           [place]



               00   :00                           every 12           Medical



                                                  (twelve)           Branch



                                                  hours.           

 

     HYDROmorpho      2022      Yes            4mg       Take 4 mg           U

nivers



     ne 4 mg      0-23                               by mouth 3           ity of



     tablet      12:52:                               (three)           [place]



               28                                 times           Medical



                                                  daily as           Branch



                                                  needed.           

 

     HYDROmorpho      2022      Yes            4mg       Take 4 mg           U

nivers



     ne 4 mg      0-23                               by mouth 3           ity of



     tablet      12:52:                               (three)           [place]



               28                                 times           Medical



                                                  daily as           Branch



                                                  needed.           

 

     HYDROmorpho      2022      Yes            4mg       Take 4 mg           U

nivers



     ne 4 mg      0-23                               by mouth 3           ity of



     tablet      12:52:                               (three)           [place]



               28                                 times           Medical



                                                  daily as           Branch



                                                  needed.           

 

     HYDROmorpho      2022      Yes            4mg       Take 4 mg           U

nivers



     ne 4 mg      0-23                               by mouth 3           ity of



     tablet      12:52:                               (three)           [place]



               28                                 times           Medical



                                                  daily as           Branch



                                                  needed.           

 

     HYDROmorpho      2022- No             .5mg      0.5 mg,           Un

[facility]



     ne        0-23 10-23                          Slow IV           ity of



     (DILAUDID)      03:15: 03:23                          Push,           [place]



     injection      00   :00                           ONCE, 1           Medical



     0.5 mg                                         dose, On           Branch



                                                  Sat            



                                                  10/22/22           



                                                  at 2215,           



                                                  Routine<br           



                                                  >Use           



                                                  approved           



                                                  by             



                                                  (Faculty):           



                                                  [facility]            



                                                  PROVIDER           

 

     HYDROmorpho      2022- No             .5mg      0.5 mg,           Un

[facility]



     ne        0-22 10-22                          Slow IV           ity of



     (DILAUDID)      02:00: 02:00                          Push,           [place]



     injection      00   :00                           ONCE, 1           Medical



     0.5 mg                                         dose, On           Branch



                                                  Fri            



                                                  10/21/22           



                                                  at 2100,           



                                                  Routine<br           



                                                  >Use           



                                                  approved           



                                                  by             



                                                  (Faculty):           



                                                  ADC            



                                                  PROVIDER           

 

     doxycycline      2022- No             100mg      100 mg, IV         

  [facility]



     (VIBRAMYCIN      0- 10-                          Piggyback,           i

ty of



     ) 100 mg in      23:30: 20:05                          Q12H ABX,           

[place]



     NaCl 0.9%      00   :47                           10 doses,           Medic

al



     (NS) 100 mL                                         First dose           Br

anch



     MINI-BAG                                         on Fri           



                                                  10/21/22           



                                                  at 1830,           



                                                  Last dose           



                                                  on Wed           



                                                  10/26/22           



                                                  at 0630,           



                                                  Administer           



                                                  over 60           



                                                  Minutes,           



                                                  100            



                                                  mL<br>Reas           



                                                  on for           



                                                  Anti-Infec           



                                                  tive:           



                                                  Empiric           



                                                  Therapy           



                                                  for            



                                                  Suspected           



                                                  Infection<           



                                                  br>Empiric           



                                                  Therapy           



                                                  Site: Skin           



                                                  / Soft           



                                                  tissue<br>           



                                                  Duration           



                                                  of             



                                                  therapy:           



                                                  72 hours           

 

     enoxaparin      2022      Yes            40mg      40 mg,           Unive

rs



     (LOVENOX)      0-21                               Subcutaneo           ity 

of



     injection      22:00:                               us, DAILY,           [place]

[place]



     40 mg      00                                 First dose           Medical



                                                  on Fri           Branch



                                                  10/21/22           



                                                  at 1700,           



                                                  Until           



                                                  Discontinu           



                                                  ed,            



                                                  Routine           

 

     ceFEPIme      2022 No             2000mg      2,000 mg,           U

nivers



     (MAXIPIME)      0-21 10-26                          IV             ity of



     2,000 mg in      21:00: 20:59                          Piggyback,          

 [place]



     NaCl 0.9%      00   :00                           Q8H ABX,           Medica

l



     (NS) 50 mL                                         15 doses,           [facility]

ch



     MINI-BAG                                         First dose           



                                                  on Fri           



                                                  10/21/22           



                                                  at 1600,           



                                                  Last dose           



                                                  on Wed           



                                                  10/26/22           



                                                  at 0800,           



                                                  Administer           



                                                  over 4           



                                                  Hours, 50           



                                                  mL<br>Reas           



                                                  on for           



                                                  Anti-Infec           



                                                  tive:           



                                                  Empiric           



                                                  Therapy           



                                                  for            



                                                  Suspected           



                                                  Infection<           



                                                  br>Empiric           



                                                  Therapy           



                                                  Site:           



                                                  Abdominal<           



                                                  br>Duratio           



                                                  n of           



                                                  therapy: 5           



                                                  days           

 

     HYDROmorpho      2022      Yes            4mg       4 mg,           Unive

rs



     ne        0-21                               Oral,           ity of



     (DILAUDID)      19:38:                               Q4HPRN,           [place]

[place]



     tablet 4 mg      15                                 Starting           Medi

sarah



                                                  on Fri           Branch



                                                  10/21/22           



                                                  at 1438,           



                                                  Until           



                                                  Discontinu           



                                                  ed,            



                                                  Routine,           



                                                  Pain           



                                                  (scale           



                                                  7-10)           

 

     sodium      2022      Yes                      Topical,           [facility]



     hypochlorit      0-21                               BID, First           it

y of



     e 0.125 %      18:30:                               dose on           [place]



     (DAKIN'S                  Medical



     SOLUTION)                                         10/21/22           Branch



     solution                                         at 1330,           



                                                  Until           



                                                  Discontinu           



                                                  ed,            



                                                  Routine           

 

     ceFEPIme      2022 No             2000mg      2,000 mg,           U

nivers



     (MAXIPIME)      0-21 10-21                          IV             ity of



     2,000 mg in      13:15: 14:55                          Piggyback,          

 [place]



     NaCl 0.9%      00   :00                           ONCE, 1           Medical



     (NS) 50 mL                                         dose, On           Branc

h



     MINI-BAG                                         Fri            



                                                  10/21/22           



                                                  at 0815,           



                                                  Administer           



                                                  over 30           



                                                  Minutes,           



                                                  50             



                                                  mL<br>Reas           



                                                  on for           



                                                  Anti-Infec           



                                                  tive:           



                                                  Empiric           



                                                  Therapy           



                                                  for            



                                                  Suspected           



                                                  Infection<           



                                                  br>Empiric           



                                                  Therapy           



                                                  Site:           



                                                  Urine<br>D           



                                                  uration of           



                                                  therapy: 5           



                                                  days           

 

     Sliding      2022      Yes                      Subcutaneo           Univ

ers



     Scale      0-21                               us, AC+HS,           ity of



     Insulin-Reg      12:30:                               First dose           

[place]



     ular + Fsbg      00                                 on Fri           Medica

l



     Testing                                         10/21/22           Branch



                                                  at 0730,           



                                                  Until           



                                                  Discontinu           



                                                  ed,            



                                                  Routine           

 

     acetaminoph      2022      Yes            650mg      650 mg,           Un

[facility]



     en        0                               Oral,           ity of



     (TYLENOL)      12:08:                               Q6HPRN,           [place]



     tablet 650      12                                 Starting           Medic

al



     mg                                           on Fri           Branch



                                                  10/21/22           



                                                  at 0708,           



                                                  Until           



                                                  Discontinu           



                                                  ed,            



                                                  Routine,           



                                                  Pain           



                                                  (scale           



                                                  1-3)           

 

     HYDROmorpho      2022 No             .5mg      0.5 mg,           Un

[place]



     ne        0-21 10-21                          Slow IV           ity of



     (DILAUDID)      12:07: 16:23                          Push,           [place]



     injection      21   :00                           Q4HPRN, 2           Medic

al



     0.5 mg                                         doses,           Branch



                                                  Starting           



                                                  on Fri           



                                                  10/21/22           



                                                  at 0707,           



                                                  Until           



                                                  Discontinu           



                                                  ed,            



                                                  Routine,           



                                                  Pain           



                                                  (scale           



                                                  7-10)<br>U           



                                                  se             



                                                  approved           



                                                  by             



                                                  (Faculty):           



                                                  [facility]            



                                                  PROVIDER           

 

     proMETHazin      2022      Yes            12.5mg      12.5 mg,           

[facility]



     e         0                               IV             ity of



     (PHENERGAN)      12:07:                               Piggyback,           

[place]



     12.5 mg in      04                                 Q4HPRN,           Medica

l



     NaCl 0.9%                                         Starting           Branch



     (NS) 50 mL                                         on Fri           



     IV                                           10/21/22           



     piggyback                                         at 0707,           



                                                  Until           



                                                  Discontinu           



                                                  ed,            



                                                  Routine,           



                                                  Nausea and           



                                                  Vomiting           



                                                  (N/V)           

 

     dextrose      2022      Yes            250mL      250 mL, IV           Un

[place]



     10% (D10W)      0-21                               Infusion,           ity 

of



     bolus      11:02:                               PRN - SEE           [place]



     infusion      05                                 INSTRUCTIO           Medic

al



     250 mL                                         NS,            Branch



                                                  Administer           



                                                  over 60           



                                                  Minutes,           



                                                  Other, If           



                                                  blood           



                                                  glucose is           



                                                  &amp;lt;           



                                                  or = 70           



                                                  mg/dL and           



                                                  patient is           



                                                  unable to           



                                                  swallow or           



                                                  has mental           



                                                  status           



                                                  changes,           



                                                  Starting           



                                                  on Fri           



                                                  10/21/22           



                                                  at             



                                                  0602<br>If           



                                                  blood           



                                                  glucose is           



                                                  < or = 70           



                                                  mg/dL and           



                                                  patient is           



                                                  unable to           



                                                  swallow or           



                                                  has mental           



                                                  status           



                                                  changes           



                                                  (Give           



                                                  glucagon           



                                                  order if           



                                                  patient           



                                                  needs           



                                                  fluid           



                                                  restrictio           



                                                  n):            



                                                  &nbsp;IF           



                                                  IV access           



                                                  available:           



                                                  Dextrose           



                                                  10%.           



                                                  &nbsp;1.           



                                                  125 mL (?           



                                                  bag) of           



                                                  D10W IV           



                                                  infusion -           



                                                  equivalent           



                                                  to 12.5 g           



                                                  dextrose           



                                                  &nbsp;2.           



                                                  Blood           



                                                  glucose -           



                                                  draw blood           



                                                  glucose 15           



                                                  minutes           



                                                  after D10W           



                                                  Administra           



                                                  tion.           



                                                  &amp;nbsp;           



                                                  3. If           



                                                  blood           



                                                  glucose is           



                                                  < 80           



                                                  mg/dL,           



                                                  repeat.<br           



                                                  >              

 

     acetaminoph      2022      Yes            650mg      650 mg,           Un

[place]



     en                                       Oral,           ity of



     (TYLENOL)      11:01:                               Q6HPRN,           [place]



     tablet 650      26                                 Starting           Medic

al



     mg                                           on Fri           Branch



                                                  10/21/22           



                                                  at 0601,           



                                                  Until           



                                                  Discontinu           



                                                  ed,            



                                                  Routine,           



                                                  Pain           



                                                  (scale           



                                                  1-3)           

 

     iopamidol      2022- No        [id number] 100mL      100 mL,          

 [facility]



     (ISOVUE      0-21 10-21                          Intravenou           ity o

f



     370-500 mL)      08:00: 08:00                          s, ONCE, 1          

 [place]



     injection      00   :00                           dose, On           Medica

l



     100 mL                                         Fri            Branch



                                                  10/21/22           



                                                  at 0300,           



                                                  Routine           

 

     ceFEPIme      2022- No             1000mg      1,000 mg,           U

nivers



     (MAXIPIME)      0-21 10-21                          IV             ity of



     injection      07:15: 07:36                          Piggyback,           [place]

[place]



     1,000 mg      00   :00                           ONCE, 1           Medical



                                                  dose, On           [facility]anch



                                                  Fri            



                                                  10/21/22           



                                                  at 0215,           



                                                  STAT<br>Re           



                                                  ason for           



                                                  Anti-Infec           



                                                  tive:           



                                                  Documented           



                                                  Infection<           



                                                  br>Documen           



                                                  huma            



                                                  Infection           



                                                  Site: Skin           



                                                  / Soft           



                                                  Tissue<br>           



                                                  Duration           



                                                  of             



                                                  Therapy:           



                                                  Other (see           



                                                  Comments)           

 

     FENTanyl PF      2022- No             50ug      50 mcg,           Un

[facility]



     (SUBLIMAZE      0-21 10-21                          Slow IV           ity o

f



     (PF))      06:30: 06:12                          Push,           [place]



     injection      00   :00                           ONCE, 1           Medical



     50 mcg                                         dose, On           [facility]



                                                  Fri            



                                                  10/21/22           



                                                  at 0130,           



                                                  ASAP           

 

     proMETHazin      2022 No             12.5mg      12.5 mg,          

 [facility]



     e         0-21 10-                          IV             ity of



     (PHENERGAN)      06:15: 06:12                          Piggyback,          

 [place]



     12.5 mg in      00   :00                           ONCE, 1           Med[facility]

l



     NaCl 0.9%                                         dose, On           Br[facility]



     (NS) 50 mL                                         Fri            



     IV                                           10/21/22           



     piggyback                                         at 0115,           



                                                  ASAP           

 

     insulin            Yes            26U       26 Units,           Unive

rs



     glargine                                     Subcutaneo           ity o

f



     (LANTUS      02:00:                               us, [facility],           [place]



     U-100)      00                                 First dose           Medical



     injection                                         (after           [facility]



     26 Units                                         last           



                                                  modificati           



                                                  on) on Sat           



                                                  22 at           



                                                  2100,           



                                                  Until           



                                                  Discontinu           



                                                  ed,            



                                                  Routine           

 

     insulin       No             24U       24 Units,           Univ

ers



     glargine                                Subcutaneo           ity 

of



     (LANTUS      20:38: 20:43                          us, ONCE,           [facility] [place]

s



     U-100)      00   :00                           1 dose, On           Medical



     injection                                         Fri            Branch



     24 Units                                         22 at           



                                                  1545, ASAP           

 

     sennosides-            Yes            1{tbl}      1 tablet,          

 [facility]



     docusate                                     Oral,           ity of



     sodium      14:00:                               DAILY,           [place]



     (SENOKOT-S)      00                                 First dose           Me

dical



     8.6-50 mg                                         on Fri           Branch



     per tablet                                         22 at           



     1 tablet                                         0900,           



                                                  Until           



                                                  Discontinu           



                                                  ed,            



                                                  Routine           

 

     polyethylen            Yes            17g       17 g,           Unive

rs



     e glycol                                     Oral,           ity of



     3350 powder      14:00:                               DAILY,           [place]

s



     17 g      00                                 First dose           Medical



                                                  on Fri           Branch



                                                  22 at           



                                                  0900,           



                                                  Until           



                                                  Discontinu           



                                                  ed,            



                                                  Routine           

 

     insulin NPH      2022- No             16U       16 Units,           

[facility]



     (HUMULIN N)                                Subcutaneo           i

ty of



     injection      14:00: 17:23                          ,            [place]



     16 Units      00   :36                           QAM+HS,           Medical



                                                  First dose           [facility]



                                                  (after           



                                                  last           



                                                  modificati           



                                                  on) on Fri           



                                                  22 at           



                                                  0900,           



                                                  Until           



                                                  Discontinu           



                                                  ed,            



                                                  Routine           

 

     Sliding            Yes                      Subcutaneo           Univ

ers



     Scale      8-                               us, TID           ity of



     Insulin -      13:00:                               MEALS+HS,           Eric

as



     Lispro      00                                 First dose           Medical



     (HumaLOG) +                                         (after           [facility]anch



     Fsbg                                         last           



     Testing                                         modificati           



                                                  on) on Fri           



                                                  22 at           



                                                  0800,           



                                                  Until           



                                                  Discontinu           



                                                  ed,            



                                                  Routine           

 

     insulin NPH       202- No             8U        8 Units,           U

nivers



     (HUMULIN N)                                Subcutaneo           i

ty of



     injection 8      02:00: 12:34                          us, QHS,           [facility]

[place]



     Units      00   :51                           First dose           Medical



                                                  (after           Branch



                                                  last           



                                                  modificati           



                                                  on) on Thu           



                                                  22 at           



                                                  2100,           



                                                  Until           



                                                  Discontinu           



                                                  ed,            



                                                  Routine           

 

     repaglinide      0      Yes       [id number] .5mg      Take 1          

 [facility]



     0.5 mg      8-12                               tablet by           ity of



     tablet      00:00:                               mouth in           [address]



                                                  morning           [place]



                                                  and 1           



                                                  tablet at           



                                                  noon and 1           



                                                  tablet in           



                                                  the            



                                                  evening.           



                                                  Take           



                                                  before           



                                                  meals.           

 

     sodium      -0      Yes       [id number]           Apply to           Un

[place]



     hypochlorit      8-12                               area(s)           ity o

f



     e 0.25%      00:00:                               daily.           [place]



     solution                                                      [facility]

 

     repaglinide      0      Yes       [id number] .5mg      Take 1          

 [facility]



     0.5 mg      8-12                               tablet by           ity of



     tablet      00:00:                               mouth in           [address]



                                                  and 1           



                                                  tablet at           



                                                  noon and 1           



                                                  tablet in           



                                                  the            



                                                  evening.           



                                                  Take           



                                                  before           



                                                  meals.           

 

     sodium      -0      Yes       [id number]           Apply to           Un

[place]



     hypochlorit      8-12                               area(s)           ity o

f



     e 0.25%      00:00:                               daily.           [place]



     solution                                                      [facility]

 

     repaglinide      -0      Yes       [id number] .5mg      Take 1          

 [facility]



     0.5 mg      8-12                               tablet by           ity of



     tablet      00:00:                               mouth in           [address]



                                                  morning           [place]



                                                  and 1           



                                                  tablet at           



                                                  noon and 1           



                                                  tablet in           



                                                  the            



                                                  evening.           



                                                  Take           



                                                  before           



                                                  meals.           

 

     sodium      -0      Yes       [id number]           Apply to           Un

[place]



     hypochlorit      8-12                               area(s)           ity o

f



     e 0.25%      00:00:                               daily.           [place]



     solution                                                      [facility]

 

     repaglinide      -0      Yes       [id number] .5mg      Take 1          

 [facility]



     0.5 mg      8-12                               tablet by           ity of



     tablet      00:00:                               mouth in           [place]



               00                                 the            Medical



                                                  morning           [facility]



                                                  and 1           



                                                  tablet at           



                                                  noon and 1           



                                                  tablet in           



                                                  the            



                                                  evening.           



                                                  Take           



                                                  before           



                                                  meals.           

 

     sodium      2022-0      Yes       [id number]           Apply to           Un

[place]



     hypochlorit      8-12                               area(s)           ity o

f



     e 0.25%      00:00:                               daily.           [place]



     solution      00                                                Medical



                                                                 [facility]

 

     repaglinide      2022-0      Yes       [id number] .5mg      Take 1          

 [facility]



     0.5 mg      8-12                               tablet by           ity of



     tablet      00:00:                               mouth in           [place]



               00                                 the            Medical



                                                  morning           [place]



                                                  and 1           



                                                  tablet at           



                                                  noon and 1           



                                                  tablet in           



                                                  the            



                                                  evening.           



                                                  Take           



                                                  before           



                                                  meals.           

 

     sodium      2022-0      Yes       [id number]           Apply to           Un

[place]



     hypochlorit      8-12                               area(s)           ity o

f



     e 0.25%      00:00:                               daily.           [place]



     solution      00                                                Medical



                                                                 [facility]

 

     repaglinide      2022-0      Yes       [id number] .5mg      Take 1          

 [facility]



     0.5 mg      8-12                               tablet by           ity of



     tablet      00:00:                               mouth in           [address]



                                                  morning           [place]



                                                  and 1           



                                                  tablet at           



                                                  noon and 1           



                                                  tablet in           



                                                  the            



                                                  evening.           



                                                  Take           



                                                  before           



                                                  meals.           

 

     sodium      2022-0      Yes       [id number]           Apply to           Un

[place]



     hypochlorit      8-12                               area(s)           ity o

f



     e 0.25%      00:00:                               daily.           [address]



                                                                 [facility]

 

     repaglinide      2022-0      Yes       [id number] .5mg      Take 1          

 [facility]



     0.5 mg      8-12                               tablet by           ity of



     tablet      00:00:                               mouth in           [place]



               00                                 the            Medical



                                                  morning           [place]



                                                  and 1           



                                                  tablet at           



                                                  noon and 1           



                                                  tablet in           



                                                  the            



                                                  evening.           



                                                  Take           



                                                  before           



                                                  meals.           

 

     sodium      2022-0      Yes       [id number]           Apply to           Un

[place]



     hypochlorit      8-12                               area(s)           ity o

f



     e 0.25%      00:00:                               daily.           [place]



     solution      00                                                Medical



                                                                 [facility]

 

     repaglinide      2022-0      Yes       [id number] .5mg      Take 1          

 [facility]



     0.5 mg      8-12                               tablet by           ity of



     tablet      00:00:                               mouth in           [place]



               00                                 the            Medical



                                                  morning           [place]



                                                  and 1           



                                                  tablet at           



                                                  noon and 1           



                                                  tablet in           



                                                  the            



                                                  evening.           



                                                  Take           



                                                  before           



                                                  meals.           

 

     sodium      2022-0      Yes       [id number]           Apply to           Un

[place]



     hypochlorit      8-12                               area(s)           ity o

f



     e 0.25%      00:00:                               daily.           [place]



     solution      00                                                Medical



                                                                 [facility]

 

     repaglinide      2022-0      Yes       [id number] .5mg      Take 1          

 [facility]



     0.5 mg      8-12                               tablet by           ity of



     tablet      00:00:                               mouth in           [address]



                                                  morning           [place]



                                                  and 1           



                                                  tablet at           



                                                  noon and 1           



                                                  tablet in           



                                                  the            



                                                  evening.           



                                                  Take           



                                                  before           



                                                  meals.           

 

     sodium      2022-0      Yes       [id number]           Apply to           Un

[place]



     hypochlorit      8-12                               area(s)           ity o

f



     e 0.25%      00:00:                               daily.           [address]

 

     repaglinide      2022-0      Yes       [id number] .5mg      Take 1          

 [facility]



     0.5 mg      8-12                               tablet by           ity of



     tablet      00:00:                               mouth in           [address]



                                                  morning           [place]



                                                  and 1           



                                                  tablet at           



                                                  noon and 1           



                                                  tablet in           



                                                  the            



                                                  evening.           



                                                  Take           



                                                  before           



                                                  meals.           

 

     sodium      2022-0      Yes       [id number]           Apply to           Un

[place]



     hypochlorit      8-12                               area(s)           ity o

f



     e 0.25%      00:00:                               daily.           [address]

 

     repaglinide      2022-0      Yes       [id number] .5mg      Take 1          

 [facility]



     0.5 mg      8-12                               tablet by           ity of



     tablet      00:00:                               mouth in           [address]



                                                  and 1           



                                                  tablet at           



                                                  noon and 1           



                                                  tablet in           



                                                  the            



                                                  evening.           



                                                  Take           



                                                  before           



                                                  meals.           

 

     sodium      2022-0      Yes       [id number]           Apply to           Un

[place]



     hypochlorit      8-12                               area(s)           ity o

f



     e 0.25%      00:00:                               daily.           [address]

 

     repaglinide      2022-0      Yes       [id number] .5mg      Take 1          

 [facility]



     0.5 mg      8-12                               tablet by           ity of



     tablet      00:00:                               mouth in           [address]



                                                  and 1           



                                                  tablet at           



                                                  noon and 1           



                                                  tablet in           



                                                  the            



                                                  evening.           



                                                  Take           



                                                  before           



                                                  meals.           

 

     sodium      2022-0      Yes       [id number]           Apply to           Un

[place]



     hypochlorit      8-12                               area(s)           ity o

f



     e 0.25%      00:00:                               daily.           [address]

 

     sodium      2022-0      Yes       [id number]           Apply to           Un

[place]



     hypochlorit      8-12                               area(s)           ity o

f



     e 0.25%      00:00:                               daily.           [address]

 

     sodium      2022-0      Yes       [id number]           Apply to           Un

[place]



     hypochlorit      8-12                               area(s)           ity o

f



     e 0.25%      00:00:                               daily.           [place]



     solution      00                                                Medical



                                                                 [facility]

 

     sodium      -      Yes       [id number]           Apply to           Un

[place]



     hypochlorit      8-12                               area(s)           ity o

f



     e 0.25%      00:00:                               daily.           [place]



     solution      00                                                Medical



                                                                 [facility]

 

     sodium            Yes       [id number]           Apply to           Un

[place]



     hypochlorit      8-12                               area(s)           ity o

f



     e 0.25%      00:00:                               daily.           [place]



     solution      00                                                Medical



                                                                 [facility]

 

     sodium      -      Yes       [id number]           Apply to           Un

[facility]



     hypochlorit      8-12                               area(s)           ity o

f



     e 0.25%      00:00:                               daily.           [place]



     solution      00                                                Medical



                                                                 Branch

 

     sodium            Yes       [id number]           Apply to           Un

[facility]



     hypochlorit      8-12                               area(s)           ity o

f



     e 0.25%      00:00:                               daily.           [place]



     solution      00                                                Medical



                                                                 Branch

 

     repaglinide      2022- No        [id number] .5mg      Take 1         

  [facility]



     0.5 mg                                tablet by           ity of



     tablet      00:00: 00:00                          mouth in           [place]



               00   :00                           the            Medical



                                                  morning           [place]



                                                  and 1           



                                                  tablet at           



                                                  noon and 1           



                                                  tablet in           



                                                  the            



                                                  evening.           



                                                  Take           



                                                  before           



                                                  meals.           

 

     repaglinide      -2022- No        [id number] .5mg      Take 1         

  [facility]



     0.5 mg                                tablet by           ity of



     tablet      00:00: 00:00                          mouth in           [place]



               00   :00                           the            Medical



                                                  morning           [place]



                                                  and 1           



                                                  tablet at           



                                                  noon and 1           



                                                  tablet in           



                                                  the            



                                                  evening.           



                                                  Take           



                                                  before           



                                                  meals.           

 

     repaglinide      -0 - No        [id number] .5mg      Take 1         

  [facility]



     0.5 mg                                tablet by           ity of



     tablet      00:00: 00:00                          mouth in           [place]



               00   :00                           the            Medical



                                                  morning           [place]



                                                  and 1           



                                                  tablet at           



                                                  noon and 1           



                                                  tablet in           



                                                  the            



                                                  evening.           



                                                  Take           



                                                  before           



                                                  meals.           

 

     apixaban 5      2022- No             5mg       Take 1           [facility]

ers



     mg tablet                                tablet by           ity 

of



               00:00: 04:59                          mouth in           [place]



               00   :00                           the            Medical



                                                  morning           [place]



                                                  and 1           



                                                  tablet in           



                                                  the            



                                                  evening.           



                                                  Do all           



                                                  this for           



                                                  30 days.           



                                                  Indication           



                                                  s:             



                                                  Symtomatic           



                                                  Superficia           



                                                  l Vein           



                                                  thrombosis           

 

     insulin      2022- No        [id number] 24U       inject 24          

 [facility]



     glargine                                Units           ity of



     100 unit/mL      00:00: 04:59                          under the           

[place]



     injection      00   :00                           skin at           [facility]



                                                  for 30           



                                                  days.           

 

     metFORMIN      -2022- No        [id number] 500mg      Take 1          

 [facility]



     500 mg                                tablet by           ity of



     tablet      00:00: 04:59                          mouth in           [place]



               00   :00                           the            Medical



                                                  morning           [place]



                                                  and 1           



                                                  tablet in           



                                                  the            



                                                  evening.           



                                                  Take with           



                                                  meals. Do           



                                                  all this           



                                                  for 30           



                                                  days.           

 

     dulaglutide      2022- No        [id number] .75mg      inject 1      

     [facility]



     (TRULICITY)                                Pen under           it

y of



     0.75 mg/0.5      00:00: 04:59                          the skin           [facility]

[facility]



     mL PnIj      00   :00                           weekly for           [facility]

l



                                                  30 days.           Branch

 

     apixaban 5      2022- No             5mg       Take 1           [facility]

ers



     mg tablet                                tablet by           ity 

of



               00:00: 04:59                          mouth in           [place]



               00   :00                           [facility]



                                                  and 1           



                                                  tablet in           



                                                  the            



                                                  evening.           



                                                  Do all           



                                                  this for           



                                                  30 days.           



                                                  Indication           



                                                  s:             



                                                  Symtomatic           



                                                  Superficia           



                                                  l Vein           



                                                  thrombosis           

 

     insulin      2022- No        [id number] 24U       inject 24          

 [facility]



     glargine                                Units           ity of



     100 unit/mL      00:00: 04:59                          under the           

[place]



     injection      00   :00                           skin at           [facility]



                                                  for 30           



                                                  days.           

 

     metFORMIN      -2022- No        [id number] 500mg      Take 1          

 [facility]



     500 mg                                tablet by           ity of



     tablet      00:00: 04:59                          mouth in           [place]



               00   :00                           the            [facility]



                                                  and 1           



                                                  tablet in           



                                                  the            



                                                  evening.           



                                                  Take with           



                                                  meals. Do           



                                                  all this           



                                                  for 30           



                                                  days.           

 

     dulaglutide      -2022- No        [id number] .75mg      inject 1      

     [facility]



     (TRULICITY)                                Pen under           it

y of



     0.75 mg/0.5      00:00: 04:59                          the skin           [facility]

[facility]



     mL PnIj      00   :00                           weekly for           [facility]

l



                                                  30 days.           [facility]

 

     apixaban 5      2022- No             5mg       Take 1           Un[facility]

ers



     mg tablet                                tablet by           ity 

of



               00:00: 04:59                          mouth in           [place]



               00   :00                           the            Medical



                                                  morning           [facility]



                                                  and 1           



                                                  tablet in           



                                                  the            



                                                  evening.           



                                                  Do all           



                                                  this for           



                                                  30 days.           



                                                  Indication           



                                                  s:             



                                                  Symtomatic           



                                                  Superficia           



                                                  l Vein           



                                                  thrombosis           

 

     insulin      2022- No        [id number] 24U       inject 24          

 [facility]



     glargine                                Units           ity of



     100 unit/mL      00:00: 04:59                          under the           

[place]



     injection      00   :00                           skin [facility]



                                                  for 30           



                                                  days.           

 

     metFORMIN      -0 - No        [id number] 500mg      Take 1          

 [facility]



     500 mg                                tablet by           ity of



     tablet      00:00: 04:59                          mouth in           [place]



               00   :00                           the            Medical



                                                  morning           [place]



                                                  and 1           



                                                  tablet in           



                                                  the            



                                                  evening.           



                                                  Take with           



                                                  meals. Do           



                                                  all this           



                                                  for 30           



                                                  days.           

 

     dulaglutide      2022- No        [id number] .75mg      inject 1      

     [facility]



     (TRULICITY)                                Pen under           it

y of



     0.75 mg/0.5      00:00: 04:59                          the skin           [facility]

[facility]



     mL PnIj      00   :00                           weekly for           [facility]

l



                                                  30 days.           [facility]

 

     apixaban 5      2022- No             5mg       Take 1           Un[facility]

ers



     mg tablet                                tablet by           ity 

of



               00:00: 04:59                          mouth in           [place]



               00   :00                           the            [facility]



                                                  and 1           



                                                  tablet in           



                                                  the            



                                                  evening.           



                                                  Do all           



                                                  this for           



                                                  30 days.           



                                                  Indication           



                                                  s:             



                                                  Symtomatic           



                                                  Superficia           



                                                  l Vein           



                                                  thrombosis           

 

     insulin      2022- No        [id number] 24U       inject 24          

 [facility]



     glargine                                Units           ity of



     100 unit/mL      00:00: 04:59                          under the           

[place]



     injection      00   :00                           skin [facility]



                                                  for 30           



                                                  days.           

 

     metFORMIN      2022- No        [id number] 500mg      Take 1          

 [facility]



     500 mg                                tablet by           ity of



     tablet      00:00: 04:59                          mouth in           [place]



               00   :00                           the            [facility]



                                                  and 1           



                                                  tablet in           



                                                  the            



                                                  evening.           



                                                  Take with           



                                                  meals. Do           



                                                  all this           



                                                  for 30           



                                                  days.           

 

     dulaglutide      -0 - No        [id number] .75mg      inject 1      

     [facility]



     (TRULICITY)                                Pen under           it

y of



     0.75 mg/0.5      00:00: 04:59                          the skin           [facility]

[facility]



     mL PnIj      00   :00                           weekly for           [facility]

l



                                                  30 days.           [facility]

 

     linezolid       No        [id number] 600mg      Take 1          

 [facility]



     600 mg      8-12 08-27                          tablet by           ity of



     tablet      00:00: 04:59                          mouth           [place]



               00   :00                           every 12           [facility]



                                                  hours for           



                                                  14 days.           

 

     fluconazole       No        [id number] 400mg      Take 2        

   [facility]



     200 mg      8-12 08-27                          tablets by           ity of



     tablet      00:00: 04:59                          mouth in           [place]



               00   :00                           the            [facility]



                                                  for 14           



                                                  days.           

 

     ciprofloxac       No        [id number] 500mg      Take 2        

   [facility]



     in HCl 250      8- 08-27                          tablets by           it

y of



     mg tablet      00:00: 04:59                          mouth in           [place]

as



               00   :00                           the            Medical



                                                  morning           [facility]



                                                  and 2           



                                                  tablets in           



                                                  the            



                                                  evening.           



                                                  Do all           



                                                  this for           



                                                  14 days.           

 

     linezolid       No        [id number] 600mg      Take 1          

 [facility]



     600 mg      8--27                          tablet by           ity of



     tablet      00:00: 04:59                          mouth           [place]



               00   :00                           every 12           [facility]



                                                  hours for           



                                                  14 days.           

 

     fluconazole       No        [id number] 400mg      Take 2        

   [facility]



     200 mg      8--27                          tablets by           ity of



     tablet      00:00: 04:59                          mouth in           [place]



               00   :00                           the            [facility]



                                                  for 14           



                                                  days.           

 

     ciprofloxac       No        [id number] 500mg      Take 2        

   [facility]



     in HCl 250      8- 08-27                          tablets by           it

y of



     mg tablet      00:00: 04:59                          mouth in           [place]

as



               00   :00                           the            Medical



                                                  morning           [facility]



                                                  and 2           



                                                  tablets in           



                                                  the            



                                                  evening.           



                                                  Do all           



                                                  this for           



                                                  14 days.           

 

     linezolid       No        [id number] 600mg      Take 1          

 [facility]



     600 mg      8-12 08-27                          tablet by           ity of



     tablet      00:00: 04:59                          mouth           [place]



               00   :00                           every 12           [facility]



                                                  hours for           



                                                  14 days.           

 

     fluconazole      - No        [id number] 400mg      Take 2        

   [facility]



     200 mg      8-12 08-27                          tablets by           ity of



     tablet      00:00: 04:59                          mouth in           [place]



               00   :00                           the            [facility]



                                                  for 14           



                                                  days.           

 

     ciprofloxac      -2022- No        [id number] 500mg      Take 2        

   [facility]



     in HCl 250      8-12 08-27                          tablets by           it

y of



     mg tablet      00:00: 04:59                          mouth in           [place]

as



               00   :00                           the            Medical



                                                  morning           [facility]



                                                  and 2           



                                                  tablets in           



                                                  the            



                                                  evening.           



                                                  Do all           



                                                  this for           



                                                  14 days.           

 

     linezolid       No        [id number] 600mg      Take 1          

 [facility]



     600 mg      8--27                          tablet by           ity of



     tablet      00:00: 04:59                          mouth           [place]



               00   :00                           every 12           Medical



                                                  (twelve)           [facility]



                                                  hours for           



                                                  14 days.           

 

     fluconazole      -2022- No        [id number] 400mg      Take 2        

   [facility]



     200 mg      8-27                          tablets by           ity of



     tablet      00:00: 04:59                          mouth in           [place]



               00   :00                           the            [facility]



                                                  for 14           



                                                  days.           

 

     ciprofloxac      2022- No        [id number] 500mg      Take 2        

   [facility]



     in HCl 250      8-27                          tablets by           it

y of



     mg tablet      00:00: 04:59                          mouth in           [facility]

as



               00   :00                           the            [facility]



                                                  and 2           



                                                  tablets in           



                                                  the            



                                                  evening.           



                                                  Do all           



                                                  this for           



                                                  14 days.           

 

     gabapentin      -2022- No        [id number] 300mg      Take 1         

  [facility]



     300 mg      8- 08-20                          capsule by           ity of



     capsule      00:00: 04:59                          mouth in           [place]



               00   :00                           the            [facility]



                                                  and 1           



                                                  capsule at           



                                                  noon and 1           



                                                  capsule in           



                                                  the            



                                                  evening.           



                                                  Do all           



                                                  this for 7           



                                                  days.           

 

     gabapentin      - No        [id number] 300mg      Take 1         

  [facility]



     300 mg      8- 08-20                          capsule by           ity of



     capsule      00:00: 04:59                          mouth in           [place]



               00   :00                           the            [facility]



                                                  and 1           



                                                  capsule at           



                                                  noon and 1           



                                                  capsule in           



                                                  the            



                                                  evening.           



                                                  Do all           



                                                  this for 7           



                                                  days.           

 

     gabapentin      - No        [id number] 300mg      Take 1         

  [facility]



     300 mg      8- 08-20                          capsule by           ity of



     capsule      00:00: 04:59                          mouth in           [place]



               00   :00                           the            Medical



                                                  morning           [facility]



                                                  and 1           



                                                  capsule at           



                                                  noon and 1           



                                                  capsule in           



                                                  the            



                                                  evening.           



                                                  Do all           



                                                  this for 7           



                                                  days.           

 

     gabapentin      - No        [id number] 300mg      Take 1         

  [facility]



     300 mg      8-12 08-20                          capsule by           ity of



     capsule      00:00: 04:59                          mouth in           [place]



               00   :00                           the            Medical



                                                  morning           [facility]



                                                  and 1           



                                                  capsule at           



                                                  noon and 1           



                                                  capsule in           



                                                  the            



                                                  evening.           



                                                  Do all           



                                                  this for 7           



                                                  days.           

 

     sodium      2022- No        [id number]           Apply to           U

nivers



     hypochlorit                                area(s)           ity 

of



     e 0.25%      00:00: 00:00                          daily.           [place]



     solution      00   :00                                          Medical



                                                                 [facility]anch

 

     repaglinide      2022- No        [id number] .5mg      Take 1         

  [facility]



     0.5 mg                                tablet by           ity of



     tablet      00:00: 00:00                          mouth in           [place]



               00   :00                           the            Medical



                                                  morning           [facility]



                                                  and 1           



                                                  tablet at           



                                                  noon and 1           



                                                  tablet in           



                                                  the            



                                                  evening.           



                                                  Take           



                                                  before           



                                                  meals. Do           



                                                  all this           



                                                  for 30           



                                                  days.           

 

     Sliding                             Subcutaneo           Uni

vers



     Scale                                , TID           ity of



     Insulin -      22:00: 12:34                          MEALS+HS,           [facility]

[place]



     Lispro      00   :51                           First dose           Medical



     (HumaLOG) +                                         (after           [facility]anch



     Fsbg                                         last           



     Testing                                         modificati           



                                                  on) on Thu           



                                                  22 at           



                                                  1700,           



                                                  Until           



                                                  Discontinu           



                                                  ed,            



                                                  Routine           

 

     acetaminoph            Yes            1000mg      1,000 mg,          

 [facility]



     en        11                               Oral, Q8H,           ity of



     (TYLENOL)      19:00:                               First dose           [facility]

[place]



     tablet      00                                 on Thu           Medical



     1,000 mg                                         22 at           [facility]

h



                                                  1400,           



                                                  Until           



                                                  Discontinu           



                                                  ed,            



                                                  Routine           

 

     methocarbam            Yes            500mg      500 mg,           Un

[place]



     oL        11                               Oral, Q6H,           ity of



     (ROBAXIN)      17:00:                               First dose           Te

[place]



     tablet 500      00                                 on Thu           Medical



     mg                                           22 at           [facility]anch



                                                  1200,           



                                                  Until           



                                                  Discontinu           



                                                  ed,            



                                                  Routine           

 

     Sliding                             Subcutaneo           Uni

vers



     Scale                                , TID           ity of



     Insulin -      17:00: 19:31                          MEALS+HS,           [facility]

[place]



     Lispro      00   :30                           First dose           Medical



     (HumaLOG) +                                         (after           Branch



     Fsbg                                         last           



     Testing                                         modificati           



                                                  on) on Thu           



                                                  22 at           



                                                  1200,           



                                                  Until           



                                                  Discontinu           



                                                  ed,            



                                                  Routine           

 

     insulin NPH            Yes            24U       24 Units,           U

nivers



     (HUMULIN N)                                     Subcutaneo           it

y of



     injection      14:00:                               us, Mariana MONROE

s



     24 Units      00                                 First dose           Medic

al



                                                  (after           Branch



                                                  last           



                                                  modificati           



                                                  on) on Thu           



                                                  22 at           



                                                  0900,           



                                                  Until           



                                                  Discontinu           



                                                  ed,            



                                                  Routine           

 

     insulin NPH      2022- No             20U       20 Units,           

[facility]



     (HUMULIN N)                                Subcutaneo           i

ty of



     injection      14:00: 19:31                          us, QAM,           Eric

as



     20 Units      00   :02                           First dose           Medic

al



                                                  (after           Branch



                                                  last           



                                                  modificati           



                                                  on) on Thu           



                                                  22 at           



                                                  0900,           



                                                  Until           



                                                  Discontinu           



                                                  ed,            



                                                  Routine           

 

     insulin            Yes            5U        5 Units,           Univer

s



     lispro                                     Subcutaneo           ity of



     (human)      13:45:                               us, TID           [place]



     (HumaLOG      00                                 MEALS,           Medical



     U-100)                                         First dose           Branch



     injection 5                                         (after           



     Units                                         last           



                                                  modificati           



                                                  on) on Thu           



                                                  22 at           



                                                  0845,           



                                                  Until           



                                                  Discontinu           



                                                  ed,            



                                                  Routine           

 

     apixaban      0      Yes            5mg       5 mg,           [facility]



     (ELIQUIS)                                     Oral, BID,           ity 

of



     tablet 5 mg      13:00:                               First dose           

[place]



               00                                 on [facility]



                                                  22 at           Branch



                                                  0800,           



                                                  Until           



                                                  Discontinu           



                                                  ed,            



                                                  Routine<br           



                                                  >Indicatio           



                                                  ns: DVT/PE           

 

     celecoxib            Yes            100mg      100 mg,           Univ

ers



     (CELEBREX)                                     Oral, BID           ity 

of



     capsule 100      13:00:                               [place]ALS,           [place]

[place]



     mg        00                                 First dose           Medical



                                                  on [facility]



                                                  22 at           



                                                  0800,           



                                                  Until           



                                                  Discontinu           



                                                  ed,            



                                                  Routine           

 

     gabapentin      0      Yes            300mg      300 mg,           Uni

vers



     (NEURONTIN)                                     Oral, TID,           it

y of



     capsule 300      13:00:                               First dose           

[place]



     mg        00                                 on [facility]



                                                  22 at           Branch



                                                  0800,           



                                                  Until           



                                                  Discontinu           



                                                  ed,            



                                                  Routine           

 

     HYDROmorpho      0      Yes            4mg       4 mg,           Unive

rs



     ne                                       Oral, TID,           ity of



     (DILAUDID)      13:00:                               First dose           T

[place]



     tablet 4 mg      00                                 (after           Medica

l



                                                  last           Branch



                                                  modificati           



                                                  on) on Thu           



                                                  22 at           



                                                  0800,           



                                                  Until           



                                                  Discontinu           



                                                  ed,            



                                                  Routine           

 

     linezolid      2022- No             600mg      600 mg,           Uni

vers



     (ZYVOX)                                Oral,           ity of



     tablet 600      03:15: 03:49                          Q12H, 1           Eric

as



     mg        00   :00                           dose,           Medical



                                                  First dose           Branch



                                                  on Wed           



                                                  8/10/22 at           



                                                  2215,           



                                                  ASAP<br>Re           



                                                  ason for           



                                                  Anti-Infec           



                                                  tive:           



                                                  Documented           



                                                  Infection<           



                                                  br>Documen           



                                                  huma            



                                                  Infection           



                                                  Site: Skin           



                                                  / Soft           



                                                  Tissue<br>           



                                                  Duration           



                                                  of             



                                                  Therapy: 7           



                                                  days<br>Re           



                                                  stricted           



                                                  use            



                                                  approved           



                                                  by: AUSTIN SPANGLER,           



                                                  ADULT ID           

 

     heparin      2022- No             5000U      5,000           [facility]



     (porcine)      8 08-11                          Units,           ity of



     injection      01:00: 12:21                          Subcutaneo           T

[place]



     5,000 Units      00   :15                           us, BID,           Medi

sar[facility]



                                                  First dose           Branch



                                                  on Wed           



                                                  8/10/22 at           



                                                  2000,           



                                                  Until           



                                                  Discontinu           



                                                  ed,            



                                                  Routine           

 

     insulin            Yes            10U       10 Units,           Unive

rs



     lispro      8-10                               Subcutaneo           ity of



     (human)      22:00:                               us, TID           [place]



     (HumaLOG      00                                 MEALS,           Medical



     U-100)                                         First dose           Branch



     injection                                         (after           



     10 Units                                         last           



                                                  modificati           



                                                  on) on Wed           



                                                  8/10/22 at           



                                                  1700,           



                                                  Until           



                                                  Discontinu           



                                                  ed,            



                                                  Routine           

 

     insulin NPH            Yes            20U       20 Units,           U

nivers



     (HUMULIN N)      8-10                               Subcutaneo           it

y of



     injection      22:00:                               us, QPM,           [place]

[place]



     20 Units      00                                 First dose           Medic

al



                                                  (after           [facility]



                                                  last           



                                                  modificati           



                                                  on) on Wed           



                                                  8/10/22 at           



                                                  1700,           



                                                  Until           



                                                  Discontinu           



                                                  ed,            



                                                  Routine           

 

     insulin NPH       No             20U       20 Units,           

[facility]



     (HUMULIN N)      8-10 08-11                          Subcutaneo           i

ty of



     injection      22:00: 13:38                          us, QPM,           [facility]

as



     20 Units      00   :17                           First dose           Medic

al



                                                  (after           Branch



                                                  last           



                                                  modificati           



                                                  on) on Wed           



                                                  8/10/22 at           



                                                  1700,           



                                                  Until           



                                                  Discontinu           



                                                  ed,            



                                                  Routine           

 

     HYDROmorpho            Yes            4mg       4 mg,           Unive

rs



     ne        8-10                               Oral,           ity of



     (DILAUDID)      15:00:                               TIDPRN,           [place]

[place]



     tablet 4 mg      00                                 Starting           Medi

sar[facility]



                                                  on Wed           Branch



                                                  8/10/22 at           



                                                  1000,           



                                                  Until           



                                                  Discontinu           



                                                  ed,            



                                                  Routine,           



                                                  Pain           



                                                  (scale           



                                                  7-10)           

 

     HYDROmorpho      2022- No             4mg       4 mg,           Univ

ers



     ne        8-10 08-11                          Oral,           ity of



     (DILAUDID)      15:00: 11:04                          TIDPRN,           Eric

as



     tablet 4 mg      00   :00                           Starting           Medi

sarah



                                                  on Wed           Branch



                                                  8/10/22 at           



                                                  1000,           



                                                  Until 22 at           



                                                  0604,           



                                                  Routine,           



                                                  Pain           



                                                  (scale           



                                                  7-10)           

 

     insulin NPH      2022- No             20U       20 Units,           

[facility]



     (HUMULIN N)      8-10 08-10                          Subcutaneo           i

ty of



     injection      14:00: 18:34                          us, QAM,           Eric

as



     20 Units      00   :03                           First dose           Medic

al



                                                  (after           Branch



                                                  last           



                                                  modificati           



                                                  on) on Wed           



                                                  8/10/22 at           



                                                  0900,           



                                                  Until           



                                                  Discontinu           



                                                  ed,            



                                                  Routine           

 

     Sliding            Yes                      Subcutaneo           [facility]

ers



     Scale      8-10                               us, TID           ity of



     Insulin -      13:00:                               MEALS+HS,           Eric

as



     Lispro      00                                 First dose           Medical



     (HumaLOG) +                                         (after           



     Fsbg                                         last           



     Testing                                         modificati           



                                                  on) on Wed           



                                                  8/10/22 at           



                                                  0800,           



                                                  Until           



                                                  Discontinu           



                                                  ed,            



                                                  Routine           

 

     Sliding      2022- No                       Subcutaneo           Uni

vers



     Scale      8-10 08-11                          us, TID           ity of



     Insulin -      13:00: 13:39                          MEALS+HS,           [facility]

[place]



     Lispro      00   :23                           First dose           Medical



     (HumaLOG) +                                         (after           



     Fsbg                                         last           



     Testing                                         modificati           



                                                  on) on Wed           



                                                  8/10/22 at           



                                                  0800,           



                                                  Until           



                                                  Discontinu           



                                                  ed,            



                                                  Routine           

 

     FENTanyl PF      2022- No             25ug      25 mcg,           Un

[place]



     (SUBLIMAZE      8-10 08-10                          Slow IV           ity o

f



     (PF))      03:00: 03:26                          Push,           [place]



     injection      00   :00                           ONCE, 1           Medical



     25 mcg                                         dose, On           22           



                                                  at 2200,           



                                                  Routine           

 

     Sliding      2022- No                       Subcutaneo           Uni

vers



     Scale      -10                          us, TID           ity of



     Insulin -      22:00: 12:44                          MEALS+HS,           [facility]

[place]



     Lispro      00   :50                           First dose           Medical



     (HumaLOG) +                                         (after           Branch



     Fsbg                                         last           



     Testing                                         modificati           



                                                  on) on 22 at           



                                                  1700,           



                                                  Until           



                                                  Discontinu           



                                                  ed,            



                                                  Routine           

 

     insulin NPH      2022- No             30U       30 Units,           

[facility]



     (HUMULIN N)      8-09 08-10                          Subcutaneo           i

ty of



     injection      22:00: 12:44                          us, QPM,           Eric

as



     30 Units      00   :50                           First dose           Medic

al



                                                  (after           Branch



                                                  last           



                                                  modificati           



                                                  on) on 22 at           



                                                  1700,           



                                                  Until           



                                                  Discontinu           



                                                  ed,            



                                                  Routine           

 

     FENTanyl PF      2022- No             25ug      25 mcg,           Un

[facility]



     (SUBLIMAZE                                Slow IV           ity o

f



     (PF))      17:54: 18:30                          Push,           [place]



     injection      44   :23                           Q5MIN PRN,           Medi

sarah



     25 mcg                                         4 doses,           Branch



                                                  Starting           



                                                  on 22 at           



                                                  1254,           



                                                  Until 22 at           



                                                  1330,           



                                                  Routine,           



                                                  Pain           



                                                  (scale           



                                                  7-10),           



                                                  PACU           

 

     ketamine      2022- No                       Intravenou           Un

[place]



     (KETALAR)                                s, ONCE           ity of



     injection      17:26: 17:56                          INTRA           [place]



               00   :00                           PROCEDURE,           Medical



                                                  Starting           Branch



                                                  on 22 at           



                                                  1226,           



                                                  Until 22 at           



                                                  1256,           



                                                  Routine,           



                                                  Intra-op           

 

     clindamycin      2022- No                       IV             Unive

rs



     in 5 %                                Piggyback,           ity of



     dextrose      17:18: 17:56                          ONCE INTRA           Te

[place]



     (CLEOCIN)      00   :00                           PROCEDURE,           Medi

sarah



     900 mg/50                                         Starting           Branch



     mL IV                                         on Tue           



     piggyback                                         22 at           



     RTU                                          1218,           



                                                  Until 22 at           



                                                  1256,           



                                                  Administer           



                                                  over 30           



                                                  Minutes,           



                                                  Intra-op           

 

     bupivacaine      2022- No                       PRN,           Unive

rs



     (preserv                                Starting           ity of



     free) 0.5%      17:00: 17:54                          on Tue           [place]

[place]



     (SENSORCAIN      00   :05                           22 at           Med

ical



     E MPF) 0.5                                         1200,           Branch



     % (5 mg/mL)                                         Intra-op           



     10 mL,                                                        



     lidocaine                                                        



     1% (PF)                                                        



     (XYLOCAINE)                                                        



     10 mL                                                        

 

     phenylephri      2022- No                       Intravenou          

 [facility]



     ne                                  s, ONCE           ity of



     (VAZCULEP)      16:41: 17:56                          INTRA           [place]



     injection      00   :00                           PROCEDURE,           Medi

sarah



                                                  Starting           Branch



                                                  on 22 at           



                                                  1141,           



                                                  Until 22 at           



                                                  1256,           



                                                  Routine,           



                                                  Intra-op           

 

     dexmedeTOMI      2022- No                       Intravenou          

 [facility]



     Dine                                s, ONCE           ity of



     (PRECEDEX)      16:31: 17:56                          INTRA           [place]



     injection      00   :00                           PROCEDURE,           Medi

sarah



                                                  Starting           Branch



                                                  on 22 at           



                                                  1131,           



                                                  Until 22 at           



                                                  1256,           



                                                  Routine,           



                                                  Intra-op           

 

     propofoL IV      2022- No                       IV             Unive

rs



     infusion                                Infusion,           ity o

f



               16:31: 17:56                          CONTINUOUS           [place]



               00   :00                           PRN,           Medical



                                                  Starting           Branch



                                                  on 22 at           



                                                  1131,           



                                                  Until 22 at           



                                                  1256,           



                                                  Routine,           



                                                  Intra-op           

 

     FENTanyl PF      2022- No                       Epidural,           

[facility]



     (SUBLIMAZE                                ONCE INTRA           it

y of



     (PF))      16:31: 17:56                          PROCEDURE,           [place]



     injection      00   :00                           Starting           Medica

l



                                                  on Tue           Branch



                                                  22 at           



                                                  1131,           



                                                  Until 22 at           



                                                  1256,           



                                                  Routine,           



                                                  Intra-op           

 

     midazolam      2022- No                       IV Push,           Uni

vers



     (VERSED)                                ONCE INTRA           ity 

of



     injection      16:31: 17:56                          PROCEDURE,           [place]

[place]



               00   :00                           Starting           Medical



                                                  on            Branch



                                                  22 at           



                                                  1131,           



                                                  Until 22 at           



                                                  1256,           



                                                  Routine,           



                                                  Intra-op           

 

     lactated      2022- No                       IV             [facility]



     ringers IV                                Infusion,           ity

 of



     infusion      16:25: 17:56                          CONTINUOUS           [place]

[place]



               00   :00                           PRN,           Medical



                                                  Starting           Branch



                                                  on 22 at           



                                                  1125,           



                                                  Until 22 at           



                                                  1256,           



                                                  Routine,           



                                                  Intra-op           

 

     insulin NPH      2022- No             34U       34 Units,           

[facility]



     (HUMULIN N)      8-09 08-10                          Subcutaneo           i

ty of



     injection      14:00: 12:44                          us, QAM,           Eric

as



     34 Units      00   :50                           First dose           Medic

al



                                                  (after           [facility]anch



                                                  last           



                                                  modificati           



                                                  on) on 22 at           



                                                  0900,           



                                                  Until           



                                                  Discontinu           



                                                  ed,            



                                                  Routine           

 

     insulin      2022- No             14U       14 Units,           Univ

ers



     lispro      8-09 08-10                          Subcutaneo           ity of



     (human)      13:00: 12:44                          us, TID           [place]



     (HumaLOG      00   :50                           MEALS,           Medical



     U-100)                                         First dose           Branch



     injection                                         (after           



     14 Units                                         last           



                                                  modificati           



                                                  on) on 22 at           



                                                  0800,           



                                                  Until           



                                                  Discontinu           



                                                  ed,            



                                                  Routine           

 

     insulin NPH      2022- No             32U       32 Units,           

[facility]



     (HUMULIN N)                                Subcutaneo           i

ty of



     injection      22:00: 12:53                          us, QPM,           Eric

as



     32 Units      00   :44                           First dose           Medic

al



                                                  (after           [facility]



                                                  last           



                                                  modificati           



                                                  on) on 22 at           



                                                  1700,           



                                                  Until           



                                                  Discontinu           



                                                  ed,            



                                                  Routine           

 

     HYDROmorpho      2022- No             4mg       4 mg,           Univ

ers



     ne        8-08 08-10                          Oral,           ity of



     (DILAUDID)      16:30: 14:59                          BIDPRN,           Eric

as



     tablet 4 mg      00   :38                           Starting           Medi

sarah



                                                  on 22 at           



                                                  1130,           



                                                  Until Wed           



                                                  8/10/22 at           



                                                  0959,           



                                                  Routine,           



                                                  Pain           



                                                  (scale           



                                                  7-10)           

 

     insulin NPH      2022- No             36U       36 Units,           

[facility]



     (HUMULIN N)                                Subcutaneo           i

ty of



     injection      14:00: 12:53                          us, QAM,           Eric

as



     36 Units      00   :44                           First dose           Medic

al



                                                  (after           Branch



                                                  last           



                                                  modificati           



                                                  on) on 22 at           



                                                  0900,           



                                                  Until           



                                                  Discontinu           



                                                  ed,            



                                                  Routine           

 

     insulin      2022- No             15U       15 Units,           Univ

ers



     lispro                                Subcutaneo           ity of



     (human)      13:30: 12:53                          us, TID           [place]



     (HumaLOG      00   :44                           MEALS,           Medical



     U-100)                                         First dose           Branch



     injection                                         (after           



     15 Units                                         last           



                                                  modificati           



                                                  on) on 22 at           



                                                  0830,           



                                                  Until           



                                                  Discontinu           



                                                  ed,            



                                                  Routine           

 

     cholestyram            Yes                      Topical           Uni

vers



     ine-nystati                                     (Apply To           ity

 of



     n-zinc      13:00:                               Affected           [place]



     oxide      00                                 Areas),           Medical



     ointment                                         BID, First           Branc

h



     1:1:1                                         dose           



     (COMPOUNDED                                         (after           



     )                                            last           



                                                  modificati           



                                                  on) on 22 at           



                                                  0800,           



                                                  Until           



                                                  Discontinu           



                                                  ed,            



                                                  Routine           

 

     cholestyram            Yes                      Topical           Uni

vers



     ine-nystati                                     (Apply To           ity

 of



     n-zinc      13:00:                               Affected           [place]



     oxide      00                                 Areas),           Medical



     ointment                                         BID, First           Branc

h



     1:1:1                                         dose           



     (COMPOUNDED                                         (after           



     )                                            last           



                                                  modificati           



                                                  on) on 22 at           



                                                  0800,           



                                                  Until           



                                                  Discontinu           



                                                  ed,            



                                                  Routine           

 

     magnesium       No             4g        4 g, IV           Univ

ers



     sulfate in                                Piggyback,           it

y of



     water 4      12:00: 15:57                          ONCE, 1           [place]



     gram/50 mL      00   :00                           dose, On           Medic

al



     (8 %) IV                                         22           Branc

h



     Piggyback 4                                         at 0700,           



     g                                            Routine           

 

     eravacyclin      2022- No             1mg/kg      123 mg (1         

  [facility]



     e (XERAVA)                                mg/kg ?123           it

y of



     123 mg in      03:15: 00:10                          kg), IV           [place]

s



     NaCl 0.9%      00   :03                           Infusion,           Medic

al



     (NS) 250 mL                                         Q12H ABX,           Bra

nch



     IV infusion                                         Administer           



                                                  over 60           



                                                  Minutes,           



                                                  First dose           



                                                  on Sun           



                                                  8/7/22 at           



                                                  2215, For           



                                                  7 days           

 

     insulin NPH      2022- No             38U       38 Units,           

[facility]



     (HUMULIN N)                                Subcutaneo           i

ty of



     injection      22:00: 13:14                          us, QPM,           Eric

as



     38 Units      00   :25                           First dose           Medic

al



                                                  (after           [facility]



                                                  last           



                                                  modificati           



                                                  on) on Sun           



                                                  8/7/22 at           



                                                  1700,           



                                                  Until           



                                                  Discontinu           



                                                  ed,            



                                                  Routine           

 

     insulin      2022- No             18U       18 Units,           Univ

ers



     lispro                                Subcutaneo           ity of



     (human)      17:00: 13:15                          us, TID           [place]



     (HumaLOG      00   :02                           MEALS,           Medical



     U-100)                                         First dose           [facility]



     injection                                         (after           



     18 Units                                         last           



                                                  modificati           



                                                  on) on Sun           



                                                  8/7/22 at           



                                                  1200,           



                                                  Until           



                                                  Discontinu           



                                                  ed,            



                                                  Routine           

 

     insulin NPH      2022- No             37U       37 Units,           

[facility]



     (HUMULIN N)                                Subcutaneo           i

ty of



     injection      14:00: 14:15                          us, QAM,           [facility]

as



     37 Units      00   :14                           First dose           Medic

al



                                                  (after           [facility]



                                                  last           



                                                  modificati           



                                                  on) on Sun           



                                                  22 at           



                                                  0900,           



                                                  Until           



                                                  Discontinu           



                                                  ed,            



                                                  Routine           

 

     Sliding                             Subcutaneo           Uni

vers



     Scale                                us, TID           ity of



     Insulin -      02:00: 12:53                          MEALS+HS,           [facility]

[place]



     Lispro      00   :44                           First dose           Medical



     (HumaLOG) +                                         (after           [facility]



     Fsbg                                         last           



     Testing                                         modificati           



                                                  on) on Sat           



                                                  22 at           



                                                  2100,           



                                                  Until           



                                                  Discontinu           



                                                  ed,            



                                                  Routine           

 

     HYDROmorpho      2022- No             2mg       2 mg,           Univ

ers



     ne                                  Oral,           ity of



     (DILAUDID)      00:17: 16:23                          Q8HPRN,           [facility]

as



     tablet 2 mg      00   :26                           Starting           Medi

sarah



                                                  on Sat           [facility]



                                                  22 at           



                                                  1917,           



                                                  Until Mon           



                                                  22 at           



                                                  1123,           



                                                  Routine,           



                                                  Pain           



                                                  (scale           



                                                  7-10)           

 

     insulin       No             17U       17 Units,           Univ

ers



     lispro                                Subcutaneo           ity of



     (human)      22:00: 14:15                          us, TID           [place]



     (HumaLOG      00   :14                           MEALS,           Medical



     U-100)                                         First dose           [facility]



     injection                                         (after           



     17 Units                                         last           



                                                  modificati           



                                                  on) on Sat           



                                                  22 at           



                                                  1700,           



                                                  Until           



                                                  Discontinu           



                                                  ed,            



                                                  Routine           

 

     insulin NPH       No             35U       35 Units,           

[facility]



     (HUMULIN N)                                Subcutaneo           i

ty of



     injection      22:00: 13:38                          us, QPM,           [facility]

as



     35 Units      00   :53                           First dose           Medic

al



                                                  (after           [facility]



                                                  last           



                                                  modificati           



                                                  on) on Sat           



                                                  22 at           



                                                  1700,           



                                                  Until           



                                                  Discontinu           



                                                  ed,            



                                                  Routine           

 

     Sliding                             Subcutaneo           Uni

vers



     Scale                                us, Q4H,           ity of



     Insulin -      21:00: 22:06                          First dose           [facility]

[place]



     Lispro      00   :35                           (after           Medical



     (HumaLOG) +                                         last           [facility]



     Fsbg                                         modificati           



     Testing                                         on) on Sat           



                                                  22 at           



                                                  1600,           



                                                  Until           



                                                  Discontinu           



                                                  ed,            



                                                  Routine           

 

     HYDROmorpho      2022- No             4mg       4 mg,           Univ

ers



     ne         0807                          Oral,           ity of



     (DILAUDID)      17:24: 00:17                          Q8HPRN,           Eric

as



     tablet 4 mg      27   :12                           Starting           Medi

[facility]



                                                  on Sat           [facility]



                                                  22 at           



                                                  1224,           



                                                  Until Sat           



                                                  22 at           



                                                  1917,           



                                                  Routine,           



                                                  Pain           



                                                  (scale           



                                                  7-10)           

 

     sodium            Yes                      Topical,           [facility]



     hypochlorit      8-06                               DAILY,           ity of



     e 0.25%      14:00:                               First dose           [place]

[facility]



     (DAKIN'S      00                                 on Sat           Medical



     SOLUTION)                                         22 at           [facility]

h



     solution                                         0900,           



                                                  Until           



                                                  Discontinu           



                                                  ed, ASAP           

 

     sodium            Yes                      Topical,           [facility]



     hypochlorit      8-                               DAILY,           ity of



     e 0.25%      14:00:                               First dose           [place]

[facility]



     (DAKIN'S      00                                 on Sat           Medical



     SOLUTION)                                         22 at           [facility]

h



     solution                                         0900,           



                                                  Until           



                                                  Discontinu           



                                                  ed, ASAP           

 

     insulin       No             12U       12 Units,           Univ

ers



     lispro                                Subcutaneo           ity of



     (human)      14:00: 19:55                          us, TIDPC,           [facility]

as



     (HumaLOG      00   :31                           First dose           Medic

al



     U-100)                                         (after           [facility]



     injection                                         last           



     12 Units                                         modificati           



                                                  on) on Sat           



                                                  22 at           



                                                  0900,           



                                                  Until           



                                                  Discontinu           



                                                  ed,            



                                                  Routine           

 

     insulin NPH       No             26U       26 Units,           

[facility]



     (HUMULIN N)                                Subcutaneo           i

ty of



     injection      14:00: 19:55                          us, QAM,           [facility]

as



     26 Units      00   :31                           First dose           Medic

al



                                                  (after           [facility]



                                                  last           



                                                  modificati           



                                                  on) on Sat           



                                                  22 at           



                                                  0900,           



                                                  Until           



                                                  Discontinu           



                                                  ed,            



                                                  Routine           

 

     Sliding       No                       Subcutaneo           Uni

vers



     Scale                                us, TID           ity of



     Insulin -      13:00: 19:55                          MEALS+HS,           [facility]

[facility]



     Lispro      00   :31                           First dose           Medical



     (HumaLOG) +                                         on Sat           [facility]



     Fsbg                                         22 at           



     Testing                                         0800,           



                                                  Until           



                                                  Discontinu           



                                                  ed,            



                                                  Routine           

 

     glucagon            Yes            1mg       1 mg,           [facility]



     (GLUCAGEN                                     Intramuscu           ity 

of



     DIAGNOSTIC      12:22:                               lar, PRN,           [facility]

[facility]



     KIT)      35                                 Starting           Medical



     injection 1                                         on Sat           [facility]



     mg                                           22 at           



                                                  0722,           



                                                  Until           



                                                  Discontinu           



                                                  ed, ASAP,           



                                                  Blood           



                                                  Glucose           



                                                  &amp;lt;           



                                                  or = 70           



                                                  mg/dL and           



                                                  patient is           



                                                  unable to           



                                                  swallow or           



                                                  has mental           



                                                  changes.           

 

     dextrose 50      0      Yes            25mL      25 mL,           Univ

ers



     % in water                                     Slow IV           ity of



     (D50W)      12:22:                               Push, PRN,           [place]



     injection      35                                 Starting           Medica

l



     25 mL                                         on Sat           [facility]



                                                  22 at           



                                                  0722,           



                                                  Until           



                                                  Discontinu           



                                                  ed, ASAP,           



                                                  Blood           



                                                  Glucose           



                                                  &amp;lt;           



                                                  or = 70           



                                                  mg/dL and           



                                                  patient is           



                                                  unable to           



                                                  swallow or           



                                                  has mental           



                                                  status           



                                                  changes.           

 

     glucagon            Yes            1mg       1 mg,           [facility]



     (GLUCAGEN                                     Intramuscu           ity 

of



     DIAGNOSTIC      12:22:                               lar, PRN,           [facility]

[facility]



     KIT)      35                                 Starting           Medical



     injection 1                                         on Sat           [facility]



     mg                                           22 at           



                                                  0722,           



                                                  Until           



                                                  Discontinu           



                                                  ed, ASAP,           



                                                  Blood           



                                                  Glucose           



                                                  &amp;lt;           



                                                  or = 70           



                                                  mg/dL and           



                                                  patient is           



                                                  unable to           



                                                  swallow or           



                                                  has mental           



                                                  changes.           

 

     dextrose 50      0      Yes            25mL      25 mL,           Univ

ers



     % in water                                     Slow IV           ity of



     (D50W)      12:22:                               Push, PRN,           [place]



     injection      35                                 Starting           Medica

l



     25 mL                                         on Sat           [facility]



                                                  22 at           



                                                  0722,           



                                                  Until           



                                                  Discontinu           



                                                  ed, ASAP,           



                                                  Blood           



                                                  Glucose           



                                                  &amp;lt;           



                                                  or = 70           



                                                  mg/dL and           



                                                  patient is           



                                                  unable to           



                                                  swallow or           



                                                  has mental           



                                                  status           



                                                  changes.           

 

     HYDROmorpho      2022- No             .2mg      0.2 mg,           Un

[facility]



     ne                                  Slow IV           ity of



     (DILAUDID)      01:49: 02:17                          Push, PRN,           

[place]



     injection      34   :00                           1 dose,           Medical



     0.2 mg                                         Starting           [facility]



                                                  on 22 at           



                                                  ,           



                                                  Until 22 at           



                                                  ,           



                                                  Routine,           



                                                  Pain           



                                                  (scale           



                                                  7-10)<br>U           



                                                  se             



                                                  approved           



                                                  by             



                                                  (Faculty):           



                                                  INTENS[facility]           



                                                  CARE UNIT           

 

     KCL 20      2022- No             40meq      40 mEq,           Univer

s



     mEq/15 mL       0805                          Oral,           ity of



     solution 40      23:30: 22:58                          ONCE, 1           [facility]

[facility]



     mEq       00   :00                           dose, On           Medical



                                                  Fri 22           [facility]



                                                  at 1830,           



                                                  Routine           

 

     Sliding                             Subcutaneo           Uni

vers



     Scale      06                          us, Q4H,           ity of



     Insulin -      22:15: 12:23                          First dose           [facility]

[place]



     Lispro      00   :48                           on Fri           Medical



     (HumaLOG) +                                         22 at           [facility]



     Fsbg                                         1715,           



     Testing                                         Until           



                                                  Discontinu           



                                                  ed,            



                                                  Routine           

 

     insulin NPH       No             22U       22 Units,           

[facility]



     (HUMULIN N)                                Subcutaneo           i

ty of



     injection      22:00: 19:55                          us, QPM,           Eric

as



     22 Units      00   :31                           First dose           Medic

al



                                                  (after           [facility]



                                                  last           



                                                  modificati           



                                                  on) on 22 at           



                                                  1700,           



                                                  Until           



                                                  Discontinu           



                                                  ed,            



                                                  Routine           

 

     magnesium       No             4g        4 g, IV           Univ

ers



     sulfate in                                Piggyback,           it

y of



     water 4      19:15: 20:22                          ONCE, 1           [place]



     gram/50 mL      00   :00                           dose, On           Medic

al



     (8 %) IV                                         Fri 22           [facility]

h



     Piggyback 4                                         at 1415,           



     g                                            Routine           

 

     HYDROmorpho       No             .2mg      0.2 mg,           Un

[facility]



     ne                                  Slow IV           ity of



     (DILAUDID)      18:00: 18:07                          Push,           [place]



     injection      00   :00                           ONCE, 1           Medical



     0.2 mg                                         dose, On           [facility]



                                                  22           



                                                  at 1300,           



                                                  Routine<br           



                                                  >Use           



                                                  approved           



                                                  by             



                                                  (Faculty):           



                                                  [facility]ENS[facility]           



                                                  CARE UNIT           

 

     insulin                   6U        6 Units,           Unive

rs



     lispro                                Subcutaneo           ity of



     (human)      14:00: 00:41                          us, TIDPC,           Eric

as



     (HumaLOG      00   :23                           First dose           Medic

al



     U-100)                                         (after           [facility]



     injection 6                                         last           



     Units                                         modificati           



                                                  on) on 22 at           



                                                  0900,           



                                                  Until           



                                                  Discontinu           



                                                  ed,            



                                                  Routine           

 

     insulin NPH       No             22U       22 Units,           

[facility]



     (HUMULIN N)                                Subcutaneo           i

ty of



     injection      14:00: 21:37                          us, QAM,           Eric

as



     22 Units      00   :09                           First dose           Medic

al



                                                  on Fri           [facility]



                                                  22 at           



                                                  0900,           



                                                  Until           



                                                  Discontinu           



                                                  ed,            



                                                  Routine           

 

     HYDROmorpho      2022- No             .2mg      0.2 mg,           Un

[facility]



     ne                                  Slow IV           ity of



     (DILAUDID)      08:15: 07:57                          Push,           [place]



     injection      00   :00                           ONCE, 1           Medical



     0.2 mg                                         dose, On           [facility]



                                                  Fri 22           



                                                  at 0315,           



                                                  Routine<br           



                                                  >Use           



                                                  approved           



                                                  by             



                                                  (Faculty):           



                                                  INTENSIVE           



                                                  CARE UNIT           

 

     HYDROcodone      2022- No             1{tbl}      1 tablet,         

  [facility]



     -acetaminop                                Oral,           ity of



     hen (NORCO)      01:32: 17:25                          Q6HPRN,           [facility]

[place]



      mg      23   :31                           Starting           Medica

l



     tablet 1                                         on u           [facility]



     tablet                                         22 at           



                                                  2032,           



                                                  Until Sat           



                                                  22 at           



                                                  1225,           



                                                  Routine,           



                                                  Pain           



                                                  (scale           



                                                  7-10)           

 

     Sliding                             Subcutaneo           Uni

vers



     Scale                                , Q4H,           ity of



     Insulin -      01:00: 21:37                          First dose           T

[place]



     lispro      00   :09                           on u           Medical



     (humaLOG) +                                         22 at           [facility]



     Fsbg                                         ,           



     Testing                                         Until           



                                                  Discontinu           



                                                  ed,            



                                                  Routine           

 

     meropenem       No             1000mg      1,000 mg,           

[facility]



     (MERREM)                                IV             ity of



     1,000 mg in      00:15: 02:14                          [facility],          

 [place]



     NaCl 0.9%      00   :44                           Q8H ABX,           Medica

l



     (NS) 50 mL                                         21 doses,           [facility]

ch



     MINI-BAG                                         First dose           



                                                  on u           



                                                  22 at           



                                                  1915, Last           



                                                  dose on           



                                                  u            



                                                  22 at           



                                                  1115,           



                                                  Administer           



                                                  over 3           



                                                  Hours, 50           



                                                  mL<br>Rest           



                                                  ricted use           



                                                  approved           



                                                  by: POP           



                                                  8TH            



                                                  FLOOR<br>R           



                                                  elmira for           



                                                  Anti-Infec           



                                                  tive:           



                                                  Documented           



                                                  Infection<           



                                                  br>Documen           



                                                  huma            



                                                  Infection           



                                                  Site:           



                                                  Urine<br>D           



                                                  uration of           



                                                  Therapy: 7           



                                                  days           

 

     HYDROmorpho      2022- No             .2mg      0.2 mg,           Un

[facility]



     ne        -                          Slow IV           ity of



     (DILAUDID)      00:00: 23:13                          Push,           [place]



     injection      00   :00                           ONCE, 1           Medical



     0.2 mg                                         dose, On           Branch



                                                  Thu 22           



                                                  at 1900,           



                                                  Routine<br           



                                                  >Use           



                                                  approved           



                                                  by             



                                                  (Faculty):           



                                                  INTENSIVE           



                                                  CARE UNIT           

 

     HYDROmorpho      2022- No             .2mg      0.2 mg,           Un

[place]



     ne                                  Slow IV           ity of



     (DILAUDID)      19:30: 18:59                          Push,           [place]



     injection      00   :00                           ONCE, 1           Medical



     0.2 mg                                         dose, On           Branch



                                                  Thu 22           



                                                  at 1430,           



                                                  Routine<br           



                                                  >Use           



                                                  approved           



                                                  by             



                                                  (Faculty):           



                                                  INTENSIVE           



                                                  CARE UNIT           

 

     cholestyram      2022- No                       Topical           Un

[facility]



     ine-nystati                                (Apply To           it

y of



     n-zinc      18:29: 12:46                          Affected           [place]



     oxide      12   :35                           Areas),           Medical



     ointment                                         PRN,           Branch



     1:1:1                                         Starting           



     (COMPOUNDED                                         on u           



     )                                            22 at           



                                                  1329,           



                                                  Until Mon           



                                                  22 at           



                                                  0746,           



                                                  Routine,           



                                                  Wound           



                                                  care,           



                                                  Cuts,           



                                                  abrasions,           



                                                  and skin           



                                                  ulcers           

 

     iopamidol      2022- No        [id number] 60mL      60 mL,           

[facility]



     (ISOVUE                                Intravenou           ity o

f



     370-500 mL)      17:25: 05:25                          s, ONCE, 1          

 [place]



     injection      00   :00                           dose, On           Medica

l



     60 mL                                         Thu 22           [facility]



                                                  at 1230,           



                                                  Routine           

 

     meropenem      2022- No             1000mg      1,000 mg,           

[facility]



     (MERREM)                                IV             ity of



     1,000 mg in      16:45: 20:14                          Piggyback,          

 [place]



     NaCl 0.9%      00   :00                           ONCE, 1           Medical



     (NS) 50 mL                                         dose, On           Branc

h



     MINI-BAG                                         Thu 22           



                                                  at 1145,           



                                                  Administer           



                                                  over 30           



                                                  Minutes,           



                                                  50             



                                                  mL<br&gt;R           



                                                  estricted           



                                                  use            



                                                  approved           



                                                  by: POP           



                                                  8TH            



                                                  FLOOR<br>R           



                                                  elmira for           



                                                  Anti-Infec           



                                                  tive:           



                                                  Documented           



                                                  Infection<           



                                                  br>Documen           



                                                  huma            



                                                  Infection           



                                                  Site:           



                                                  Urine<br>D           



                                                  uration of           



                                                  Therapy: 7           



                                                  days           

 

     pantoprazol            Yes            40mg      40 mg,           Univ

ers



     e                                        Oral,           ity of



     (PROTONIX)      14:00:                               DAILY,           [place]



     EC tablet      00                                 First dose           Medi

sarah



     40 mg                                         on Thu           Branch



                                                  22 at           



                                                  0900,           



                                                  Until           



                                                  Discontinu           



                                                  ed,            



                                                  Routine<br           



                                                  >Indicatio           



                                                  n for use:           



                                                  None of           



                                                  the above           

 

     pantoprazol            Yes            40mg      40 mg,           Univ

ers



     e                                        Oral,           ity of



     (PROTONIX)      14:00:                               DAILY,           [place]



     EC tablet      00                                 First dose           Medi

sarah



     40 mg                                         on u           Branch



                                                  22 at           



                                                  0900,           



                                                  Until           



                                                  Discontinu           



                                                  ed,            



                                                  Routine<br           



                                                  >Indicatio           



                                                  n for use:           



                                                  None of           



                                                  the above           

 

     heparin      -0      Yes            5000U      5,000           [facility]



     (porcine)                                     Units,           ity of



     injection      13:00:                               Subcutaneo           Te

[place]



     5,000 Units      00                                 us, Q12H,           Med

ical



                                                  First dose           Branch



                                                  on Thu           



                                                  22 at           



                                                  0800,           



                                                  Until           



                                                  Discontinu           



                                                  ed,            



                                                  Routine           

 

     heparin      -0 - No             5000U      5,000           [facility]



     (porcine)       08-10                          Units,           ity of



     injection      13:00: 21:57                          Subcutaneo           T

[place]



     5,000 Units      00   :33                           us, Q12H,           Med

ical



                                                  First dose           Branch



                                                  on Thu           



                                                  22 at           



                                                  0800,           



                                                  Until           



                                                  Discontinu           



                                                  ed,            



                                                  Routine           

 

     NaCl 0.9%      -0      Yes            10mL      10 mL,           Univer

s



     (NS)      8                               Slow IV           ity of



     injection      11:28:                               Push, PRN,           Te

[place]



     10 mL      11                                 Starting           Medical



                                                  on Thu           Branch



                                                  22 at           



                                                  0628,           



                                                  Until           



                                                  Discontinu           



                                                  ed,            



                                                  Routine,           



                                                  line           



                                                  maintenanc           



                                                  e              

 

     lidocaine      -0      Yes            5mL       5 mL,           [facility]



     1% (PF)                                     Subcutaneo           ity of



     (XYLOCAINE)      11:28:                               us, PRN,           Te

[place]



     injection 5      11                                 Starting           Medi

sarah



     mL                                           on Thu           Branch



                                                  22 at           



                                                  0628,           



                                                  Until           



                                                  Discontinu           



                                                  ed,            



                                                  Routine,           



                                                  Local           



                                                  anesthesia           

 

     NaCl 0.9%      -0      Yes            10mL      10 mL,           Univer

s



     (NS)      804                               Slow IV           ity of



     injection      11:28:                               Push, PRN,           Te

[place]



     10 mL      11                                 Starting           Medical



                                                  on u           Branch



                                                  22 at           



                                                  0628,           



                                                  Until           



                                                  Discontinu           



                                                  ed,            



                                                  Routine,           



                                                  line           



                                                  maintenanc           



                                                  e              

 

     lidocaine      -0      Yes            5mL       5 mL,           [facility]



     1% (PF)      8-04                               Subcutaneo           ity of



     (XYLOCAINE)      11:28:                               us, PRN,           Te

[place]



     injection 5      11                                 Starting           Medi

sarah



     mL                                           on Thu           [facility]anch



                                                  22 at           



                                                  0628,           



                                                  Until           



                                                  Discontinu           



                                                  ed,            



                                                  Routine,           



                                                  Local           



                                                  anesthesia           

 

     NaCl 0.9%      2022- No             1000mL      at 999           Uni

vers



     (NS) bolus                                mL/hr,           ity of



     infusion      07:30: 06:55                          1,000 mL,           Eric

as



     1,000 mL      00   :00                           IV             Medical



                                                  Infusion,           Branch



                                                  ONCE, 1           



                                                  dose, On           



                                                  Thu 22           



                                                  at 0230,           



                                                  ASAP           

 

     FENTanyl PF       No             50ug      50 mcg,           Un

[facility]



     (SUBLIMAZE                                Slow IV           ity o

f



     (PF))      06:45: 06:01                          Push,           [place]



     injection      00   :00                           ONCE, 1           Medical



     50 mcg                                         dose, On           Branch



                                                  u 22           



                                                  at 0145,           



                                                  Routine           

 

     cefTRIAXone       No             1000mg      1,000 mg,         

  [facility]



     (ROCEPHIN)                                IV             ity of



     1,000 mg in      05:30: 06:00                          [place], [place]



     NaCl 0.9%      00   :00                           ONCE, 1           Medical



     (NS) 50 mL                                         dose, On           [facility]

h



     MINI-BAG                                         u 22           



                                                  at 0030,           



                                                  Administer           



                                                  over 30           



                                                  Minutes,           



                                                  50             



                                                  mL<br&gt;R           



                                                  [place] for           



                                                  Anti-Infec           



                                                  tive:           



                                                  Empiric           



                                                  Therapy           



                                                  for            



                                                  Suspected           



                                                  Infection<           



                                                  br>Empiric           



                                                  Therapy           



                                                  Site:           



                                                  Urine<br>D           



                                                  uration of           



                                                  therapy:           



                                                  72 hours           

 

     NaCl 0.9%      2022- No             1000mL      at 999           Uni

vers



     (NS) bolus                                mL/hr,           ity of



     infusion      05:30: 07:58                          1,000 mL,           [facility]

as



     1,000 mL      00   :00                           IV             Medical



                                                  Infusion,           Branch



                                                  ONCE, 1           



                                                  dose, On           



                                                  u 22           



                                                  at 0030,           



                                                  ASAP           

 

     NaCl 0.9%      2022- No             1000mL      at 999           Uni

vers



     (NS) bolus       0804                          mL/hr,           ity of



     infusion      05:15: 07:58                          1,000 mL,           [facility]

as



     1,000 mL      00   :00                           IV             Medical



                                                  Infusion,           Branch



                                                  ONCE, 1           



                                                  dose, On           



                                                  Thu 8/4/22           



                                                  at 0015,           



                                                  ASAP           

 

     D5W 0.45%      2022- No                       IV             [facility]



     NaCl                                Infusion,           ity of



     (1/2NS) 1 L      04:53: 22:22                          at 200           Eric

as



     + KCL 20      49   :19                           mL/hr, PRN           Medic

al



     mEq                                          - SEE           Branch



                                                  INSTRUCTIO           



                                                  NS,            



                                                  Starting           



                                                  on Wed           



                                                  8/3/22 at           



                                                  2353,           



                                                  Until 22 at           



                                                  1722,           



                                                  ASAP,           



                                                  Blood           



                                                  glucose           



                                                  control           

 

     proMETHazin      2022- No             25mg      25 mg, IV           

[facility]



     e                                   Piggyback,           ity of



     (PHENERGAN)      04:15: 04:22                          ONCE, 1           Te

[facility]



     25 mg in      00   :00                           dose, On           Medical



     NaCl 0.9%                                         Wed 8/3/22           [facility]

ch



     (NS) 50 mL                                         at 2315,           



     IV                                           ASAP           



     piggyback                                                        

 

     proMETHazin      2022- No             12.5mg      12.5 mg,          

 [facility]



     e          0730                          IV             ity of



     (PHENERGAN)      21:30: 22:14                          Piggyback,          

 [place]



     12.5 mg in      00   :00                           ONCE, 1           Medica

l



     NaCl 0.9%                                         dose, On           Branch



     (NS) 50 mL                                         Sat            



     IV                                           22 at           



     piggyback                                         1630, 50           



                                                  mL             

 

     lactated      0      Yes            1000mL      at 50           Un[facility]

s



     ringers IV      7-30                               mL/hr,           ity of



     infusion      19:15:                               1,000 mL,           [place]

[place]



     1,000 mL      00                                 IV             Medical



                                                  Infusion,           Branch



                                                  CONTINUOUS           



                                                  , Starting           



                                                  on Sat           



                                                  22 at           



                                                  1415,           



                                                  Until           



                                                  Discontinu           



                                                  ed,            



                                                  Routine           

 

     insulin            Yes            7U        7 Units,           Univer

s



     lispro      7-30                               Subcutaneo           ity of



     (human)      17:00:                               us, TID           [place]



     (HumaLOG      00                                 MEALS,           Medical



     U-100)                                         First dose           Branch



     injection 7                                         (after           



     Units                                         last           



                                                  modificati           



                                                  on) on Sat           



                                                  22 at           



                                                  1200,           



                                                  Until           



                                                  Discontinu           



                                                  ed,            



                                                  Routine           

 

     insulin      0      Yes            40U       40 Units,           Unive

rs



     glargine      7-30                               Subcutaneo           ity o

f



     (LANTUS      14:00:                               us, DAILY,           [place]

[place]



     U-100)      00                                 First dose           Medical



     injection                                         (after           Branch



     40 Units                                         last           



                                                  modificati           



                                                  on) on Sat           



                                                  22 at           



                                                  0900,           



                                                  Until           



                                                  Discontinu           



                                                  ed,            



                                                  Routine           

 

     insulin NPH      2022- No        [id number] 50U       inject 50      

     [facility]



     and regular      7-30 08-30                          Units           ity of



     human 70-30      00:00: 04:59                          under the           

[place]



     100 unit/mL      00   :00                           skin every           Me

dical



     (70-30)                                         morning           Branch



     injection                                         and            



                                                  evening           



                                                  for 30           



                                                  days.           

 

     proMETHazin      2022- No        [id number] 12.5mg      Insert 1     

      [facility]



     e 12.5 mg      7-30 08-30                          Suppositor           ity

 of



     suppository      00:00: 04:59                          y into           Eric

as



               00   :00                           rectum           Medical



                                                  every 6           Branch



                                                  (six)           



                                                  hours as           



                                                  needed for           



                                                  Nausea and           



                                                  Vomiting           



                                                  (N/V) for           



                                                  up to 30           



                                                  days.           

 

     proMETHazin      2022- No        [id number] 12.5mg      Take 0.5     

      [facility]



     e 25 mg      7-30 08-30                          tablets by           ity o

f



     tablet      00:00: 04:59                          mouth           [place]



               00   :00                           every 4           Medical



                                                  (four)           Branch



                                                  hours as           



                                                  needed for           



                                                  Nausea and           



                                                  Vomiting           



                                                  (N/V) for           



                                                  up to 30           



                                                  days.           

 

     insulin NPH      2022- No        [id number] 50U       inject 50      

     [facility]



     and regular      7-30 08-30                          Units           ity of



     human 70-30      00:00: 04:59                          under the           

[place]



     100 unit/mL      00   :00                           skin every           Me

dical



     (70-30)                                         morning           Branch



     injection                                         and            



                                                  evening           



                                                  for 30           



                                                  days.           

 

     proMETHazin      2022- No        [id number] 12.5mg      Insert 1     

      [facility]



     e 12.5 mg      7-30 08-30                          Suppositor           ity

 of



     suppository      00:00: 04:59                          y into           [facility]

as



               00   :00                           rectum           Medical



                                                  every 6           Branch



                                                  (six)           



                                                  hours as           



                                                  needed for           



                                                  Nausea and           



                                                  Vomiting           



                                                  (N/V) for           



                                                  up to 30           



                                                  days.           

 

     proMETHazin      2022- No        [id number] 12.5mg      Take 0.5     

      [facility]



     e 25 mg      7-30 08-30                          tablets by           ity o

f



     tablet      00:00: 04:59                          mouth           [place]



               00   :00                           every 4           Medical



                                                  (four)           Branch



                                                  hours as           



                                                  needed for           



                                                  Nausea and           



                                                  Vomiting           



                                                  (N/V) for           



                                                  up to 30           



                                                  days.           

 

     insulin NPH      2022- No        [id number] 50U       inject 50      

     [facility]



     and regular      7-30 08-30                          Units           ity of



     human 70-30      00:00: 04:59                          under the           

[place]



     100 unit/mL      00   :00                           skin every           Me

dical



     (70-30)                                         morning           Branch



     injection                                         and            



                                                  evening           



                                                  for 30           



                                                  days.           

 

     proMETHazin      2022- No        [id number] 12.5mg      Insert 1     

      [facility]



     e 12.5 mg      7-30 08-30                          Suppositor           ity

 of



     suppository      00:00: 04:59                          y into           Eric

as



               00   :00                           rectum           Medical



                                                  every 6           Branch



                                                  (six)           



                                                  hours as           



                                                  needed for           



                                                  Nausea and           



                                                  Vomiting           



                                                  (N/V) for           



                                                  up to 30           



                                                  days.           

 

     proMETHazin      2022- No        [id number] 12.5mg      Take 0.5     

      [facility]



     e 25 mg      7-30 08-30                          tablets by           ity o

f



     tablet      00:00: 04:59                          mouth           [place]



               00   :00                           every 4           Medical



                                                  (four)           Branch



                                                  hours as           



                                                  needed for           



                                                  Nausea and           



                                                  Vomiting           



                                                  (N/V) for           



                                                  up to 30           



                                                  days.           

 

     insulin NPH      2022- No        [id number] 50U       inject 50      

     [facility]



     and regular      7-30 08-30                          Units           ity of



     human 70-30      00:00: 04:59                          under the           

[place]



     100 unit/mL      00   :00                           skin every           Me

dical



     (70-30)                                         morning           Branch



     injection                                         and            



                                                  evening           



                                                  for 30           



                                                  days.           

 

     proMETHazin      2022- No        [id number] 12.5mg      Insert 1     

      [facility]



     e 12.5 mg      7-30 08-30                          Suppositor           ity

 of



     suppository      00:00: 04:59                          y into           [facility]

as



               00   :00                           rectum           Medical



                                                  every 6           Branch



                                                  (six)           



                                                  hours as           



                                                  needed for           



                                                  Nausea and           



                                                  Vomiting           



                                                  (N/V) for           



                                                  up to 30           



                                                  days.           

 

     proMETHazin      2022- No        [id number] 12.5mg      Take 0.5     

      [facility]



     e 25 mg      7-30 08-30                          tablets by           ity o

f



     tablet      00:00: 04:59                          mouth           [place]



               00   :00                           every 4           Medical



                                                  (four)           Branch



                                                  hours as           



                                                  needed for           



                                                  Nausea and           



                                                  Vomiting           



                                                  (N/V) for           



                                                  up to 30           



                                                  days.           

 

     insulin NPH      2022- No        [id number] 50U       inject 50      

     [facility]



     and regular      7-30 08-30                          Units           ity of



     human 70-30      00:00: 04:59                          under the           

[place]



     100 unit/mL      00   :00                           skin every           Me

dical



     (70-30)                                         morning           Branch



     injection                                         and            



                                                  evening           



                                                  for 30           



                                                  days.           

 

     proMETHazin      2022- No        [id number] 12.5mg      Insert 1     

      [facility]



     e 12.5 mg      7-30 08-30                          Suppositor           ity

 of



     suppository      00:00: 04:59                          y into           [facility]

as



               00   :00                           rectum           Medical



                                                  every 6           Branch



                                                  (six)           



                                                  hours as           



                                                  needed for           



                                                  Nausea and           



                                                  Vomiting           



                                                  (N/V) for           



                                                  up to 30           



                                                  days.           

 

     proMETHazin      2022- No        [id number] 12.5mg      Take 0.5     

      [facility]



     e 25 mg      7-30 08-30                          tablets by           ity o

f



     tablet      00:00: 04:59                          mouth           [place]



               00   :00                           every 4           Medical



                                                  (four)           Branch



                                                  hours as           



                                                  needed for           



                                                  Nausea and           



                                                  Vomiting           



                                                  (N/V) for           



                                                  up to 30           



                                                  days.           

 

     proMETHazin      2022- No        [id number] 12.5mg      Insert 1     

      [facility]



     e 12.5 mg      7-30 08-30                          Suppositor           ity

 of



     suppository      00:00: 04:59                          y into           [facility]

as



               00   :00                           rectum           Medical



                                                  every 6           Branch



                                                  (six)           



                                                  hours as           



                                                  needed for           



                                                  Nausea and           



                                                  Vomiting           



                                                  (N/V) for           



                                                  up to 30           



                                                  days.           

 

     proMETHazin      2022- No        [id number] 12.5mg      Take 0.5     

      [facility]



     e 25 mg      7-30 08-30                          tablets by           ity o

f



     tablet      00:00: 04:59                          mouth           [place]



               00   :00                           every 4           Medical



                                                  (four)           Branch



                                                  hours as           



                                                  needed for           



                                                  Nausea and           



                                                  Vomiting           



                                                  (N/V) for           



                                                  up to 30           



                                                  days.           

 

     proMETHazin      2022- No        [id number] 12.5mg      Insert 1     

      [facility]



     e 12.5 mg      7-30 08-30                          Suppositor           ity

 of



     suppository      00:00: 04:59                          y into           [facility]

as



               00   :00                           rectum           Medical



                                                  every 6           [facility]anch



                                                  (six)           



                                                  hours as           



                                                  needed for           



                                                  Nausea and           



                                                  Vomiting           



                                                  (N/V) for           



                                                  up to 30           



                                                  days.           

 

     proMETHazin      2022- No        [id number] 12.5mg      Take 0.5     

      [facility]



     e 25 mg      7-30 08-30                          tablets by           ity o

f



     tablet      00:00: 04:59                          mouth           [place]



               00   :00                           every 4           Medical



                                                  (four)           Branch



                                                  hours as           



                                                  needed for           



                                                  Nausea and           



                                                  Vomiting           



                                                  (N/V) for           



                                                  up to 30           



                                                  days.           

 

     proMETHazin      2022- No        [id number] 12.5mg      Insert 1     

      [facility]



     e 12.5 mg      7-30 08-30                          Suppositor           ity

 of



     suppository      00:00: 04:59                          y into           [facility]

as



               00   :00                           rectum           Medical



                                                  every 6           [facility]anch



                                                  (six)           



                                                  hours as           



                                                  needed for           



                                                  Nausea and           



                                                  Vomiting           



                                                  (N/V) for           



                                                  up to 30           



                                                  days.           

 

     proMETHazin      2022- No        [id number] 12.5mg      Take 0.5     

      [facility]



     e 25 mg      7-30 08-30                          tablets by           ity o

f



     tablet      00:00: 04:59                          mouth           [place]



               00   :00                           every 4           Medical



                                                  (four)           Branch



                                                  hours as           



                                                  needed for           



                                                  Nausea and           



                                                  Vomiting           



                                                  (N/V) for           



                                                  up to 30           



                                                  days.           

 

     proMETHazin      2022- No        [id number] 12.5mg      Insert 1     

      [facility]



     e 12.5 mg      7-30 08-30                          Suppositor           ity

 of



     suppository      00:00: 04:59                          y into           [facility]

as



               00   :00                           rectum           Medical



                                                  every 6           [facility]anch



                                                  (six)           



                                                  hours as           



                                                  needed for           



                                                  Nausea and           



                                                  Vomiting           



                                                  (N/V) for           



                                                  up to 30           



                                                  days.           

 

     proMETHazin      2022- No        [id number] 12.5mg      Take 0.5     

      [facility]



     e 25 mg      7-30 08-30                          tablets by           ity o

f



     tablet      00:00: 04:59                          mouth           [place]



               00   :00                           every 4           Medical



                                                  (four)           Branch



                                                  hours as           



                                                  needed for           



                                                  Nausea and           



                                                  Vomiting           



                                                  (N/V) for           



                                                  up to 30           



                                                  days.           

 

     insulin NPH      2022- No        [id number] 50U       inject 50      

     [facility]



     and regular      7-30 08-12                          Units           ity of



     human 70-30      00:00: 00:00                          under the           

[place]



     100 unit/mL      00   :00                           skin every           Me

dical



     (70-30)                                         morning           Branch



     injection                                         and            



                                                  evening           



                                                  for 30           



                                                  days.           

 

     insulin      2022- No             5U        5 Units,           Unive

rs



     lispro                                Subcutaneo           ity of



     (human)      17:00: 14:45                          us, TID           [place]



     (HumaLOG      00   :04                           MEALS,           Medical



     U-100)                                         First dose           Branch



     injection 5                                         (after           



     Units                                         last           



                                                  modificati           



                                                  on) on 22 at           



                                                  1200,           



                                                  Until           



                                                  Discontinu           



                                                  ed,            



                                                  Routine           

 

     HYDROmorphO      2022- No             1mg       1 mg,           Univ

ers



     ne                                  Intravenou           ity of



     (DILAUDID)      16:54: 16:53                          s, Q6HPRN,           

[place]



     injection 1      28   :28                           Starting           Medi

[facility]



     mg                                           on 22 at           



                                                  1154,           



                                                  Until Sun           



                                                  7/31/22 at           



                                                  1153,           



                                                  Routine,           



                                                  Breakthrou           



                                                  gh pain           



                                                  only<br>Us           



                                                  e approved           



                                                  by             



                                                  (Faculty):           



                                                  ADC            



                                                  PROVIDER           

 

     insulin      2022- No             35U       35 Units,           Univ

ers



     glargine                                Subcutaneo           ity 

of



     (LANTUS      14:00: 14:02                          us, DAILY,           [facility]

as



     U-100)      00   :21                           First dose           Medical



     injection                                         (after           [facility]anch



     35 Units                                         last           



                                                  modificati           



                                                  on) on 22 at           



                                                  0900,           



                                                  Until           



                                                  Discontinu           



                                                  ed,            



                                                  Routine           

 

     NaCl 0.9%            Yes            10mL      10 mL,           Univer

s



     (NS)                                     Slow IV           ity of



     injection      01:04:                               Push, PRN,           [place]

[place]



     10 mL      34                                 Starting           Medical



                                                  on Thu           [place]



                                                  22 at           



                                                  2004,           



                                                  Until           



                                                  Discontinu           



                                                  ed,            



                                                  Routine,           



                                                  line           



                                                  maintenanc           



                                                  e              

 

     lidocaine            Yes            5mL       5 mL,           [facility]



     1% (PF)                                     Subcutaneo           ity of



     (XYLOCAINE)      01:04:                               us, PRN,           [facility]

[place]



     injection 5      34                                 Starting           Medi

[facility]



     mL                                           on Thu           [place]



                                                  22 at           



                                                  2004,           



                                                  Until           



                                                  Discontinu           



                                                  ed,            



                                                  Routine,           



                                                  Local           



                                                  anesthesia           

 

     nystatin            Yes                      Topical,           Unive

rs



     (NYSTOP)                                     BID, First           ity o

f



     powder      01:00:                               dose on           [place]



                           Medical



                                                  22 at           [facility]



                                                  2000,           



                                                  Until           



                                                  Discontinu           



                                                  ed,            



                                                  Routine           

 

     Sliding            Yes                      Subcutaneo           Univ

ers



     Scale                                     us, TID           ity of



     Insulin -      22:00:                               MEALS+HS,           [facility]

as



     Lispro      00                                 First dose           Medical



     (HumaLOG) +                                         (after           [place]



     Fsbg                                         last           



     Testing                                         modificati           



                                                  on) on 22 at           



                                                  1700,           



                                                  Until           



                                                  Discontinu           



                                                  ed,            



                                                  Routine           

 

     insulin       No             3U        3 Units,           Unive

rs



     lispro                                Subcutaneo           ity of



     (human)      22:00: 14:02                          , TID           [place]



     (HumaLOG      00   :21                           MEALS,           Medical



     U-100)                                         First dose           [facility]anch



     injection 3                                         on Thu           



     Units                                         22 at           



                                                  1700,           



                                                  Until           



                                                  Discontinu           



                                                  ed,            



                                                  Routine           

 

     mupirocin            Yes                                     [facility]



     (BACTROBAN                                                    ity of



     OINT) 2 %      21:45:                                              [place]



     skin      00                                                Medical



     ointment                                                        Branch

 

     collagenase            Yes                      Topical           Uni

vers



     (SANTYL)                                     (Apply To           ity of



     ointment      21:45:                               Affected           [place]



               00                                 Areas),           Medical



                                                  DAILY,           Branch



                                                  First dose           



                                                  (after           



                                                  last           



                                                  modificati           



                                                  on) on u           



                                                  22 at           



                                                  1645,           



                                                  Until           



                                                  Discontinu           



                                                  ed,            



                                                  Routine           

 

     HYDROcodone            Yes            1{tbl}      1 tablet,          

 [facility]



     -acetaminop                                     Oral,           ity of



     hen (NORCO      19:54:                               Q6HPRN,           [place]

[place]



     5) 5-325 mg      07                                 Starting           Medi

[facility]



     tablet 1                                         on Thu           Branch



     tablet                                         22 at           



                                                  1454,           



                                                  Until           



                                                  Discontinu           



                                                  ed,            



                                                  Routine,           



                                                  Pain           



                                                  (scale           



                                                  4-6)           

 

     HYDROcodone            Yes            1{tbl}      1 tablet,          

 [facility]



     -acetaminop                                     Oral,           ity of



     hen (NORCO)      19:47:                               Q6HPRN,           [facility]

as



      mg      33                                 Starting           Medica

l



     tablet 1                                         on u           Branch



     tablet                                         22 at           



                                                  1447,           



                                                  Until           



                                                  Discontinu           



                                                  ed,            



                                                  Routine,           



                                                  Pain           



                                                  (scale           



                                                  7-10)           

 

     lactated      2022- No             1000mL      at 100           Univ

ers



     ringers IV       07-30                          mL/hr,           ity of



     infusion      18:30: 19:10                          1,000 mL,           [facility]

as



     1,000 mL      00   :52                           IV             Medical



                                                  Infusion,           Branch



                                                  CONTINUOUS           



                                                  , Starting           



                                                  on u           



                                                  22 at           



                                                  1330,           



                                                  Until Sat           



                                                  22 at           



                                                  1410,           



                                                  Routine           

 

     fluconazole      2022- No             200mg      200 mg,           U

nivers



     (DIFLUCAN)      28                          Oral,           ity of



     tablet 200      14:00: 19:47                          DAILY,           [place]

[place]



     mg        00   :17                           First dose           Medical



                                                  on u           [facility]anch



                                                  22 at           



                                                  0900,           



                                                  Until           



                                                  Discontinu           



                                                  ed,            



                                                  ASAP<br>Re           



                                                  ason for           



                                                  Anti-Infec           



                                                  tive:           



                                                  Empiric           



                                                  Therapy           



                                                  for            



                                                  Suspected           



                                                  Infection<           



                                                  br>Empiric           



                                                  Therapy           



                                                  Site:           



                                                  Urine<br&g           



                                                  t;Duration           



                                                  of             



                                                  therapy:           



                                                  72 hours           

 

     NaCl 0.45%       No             1000mL      at 150           Un

[place]



     (1/2NS) IV                                mL/hr,           ity of



     infusion      02:15: 13:47                          1,000 mL,           [facility]

as



     1,000 mL      00   :19                           IV             Medical



                                                  Infusion,           Branch



                                                  CONTINUOUS           



                                                  , Starting           



                                                  on Wed           



                                                  22 at           



                                                  2115,           



                                                  Until Thu           



                                                  22 at           



                                                  0847,           



                                                  Routine           

 

     proMETHazin       No             12.5mg      12.5 mg,          

 [facility]



     e                                   IV             ity of



     (PHENERGAN)      02:15: 01:35                          Piggyback,          

 [place]



     12.5 mg in      00   :00                           ONCE, 1           Medica

l



     NaCl 0.9%                                         dose, On           Branch



     (NS) 50 mL                                         Wed            



     IV                                           22 at           



     piggyback                                         211,           



                                                  Routine           

 

     insulin       No             10U       10 Units,           Univ

ers



     lispro                                Subcutaneo           ity of



     (human)      01:30: 00:44                          us, ONCE,           [place]

[place]



     (HumaLOG      00   :00                           1 dose, On           Medic

al



     U-100)                                         Wed            Branch



     injection                                         22 at           



     10 Units                                         , ASAP           

 

     ertapenem      2022- No             1000mg      1,000 mg,           

[facility]



     (INVANZ)                                Intramuscu           ity 

of



     injection      01:30: 18:10                          lar, Q24H           [facility]

[place]



     1,000 mg      00   :43                           ABX, First           Medic

al



                                                  dose on           [facility]anch



                                                  Wed            



                                                  22 at           



                                                  2030,           



                                                  Until           



                                                  Discontinu           



                                                  ed,            



                                                  ASAP<br&gt           



                                                  ;Reason           



                                                  for            



                                                  Anti-Infec           



                                                  tive:           



                                                  Empiric           



                                                  Therapy           



                                                  for            



                                                  Suspected           



                                                  Infection<           



                                                  br>Empiric           



                                                  Therapy           



                                                  Site:           



                                                  Urine<br>D           



                                                  uration of           



                                                  therapy:           



                                                  72             



                                                  hours<br>R           



                                                  estricted           



                                                  use            



                                                  approved           



                                                  by:            



                                                  History of           



                                                  ESBL           



                                                  infection           



                                                  in past 3           



                                                  months           

 

     HYDROmorphO      2022- No             1mg       1 mg,           Univ

ers



     ne                                  Intravenou           ity of



     (DILAUDID)      01:01: 19:45                          s, Q4HPRN,           

[place]



     injection 1      21   :38                           Starting           Medi

sar[facility]



     mg                                           on Wed           Branch



                                                  22 at           



                                                  2001,           



                                                  Until Thu           



                                                  22 at           



                                                  1445,           



                                                  Routine,           



                                                  Pain           



                                                  (scale           



                                                  7-10)<br>U           



                                                  se             



                                                  approved           



                                                  by             



                                                  (Faculty):           



                                                  ADC            



                                                  PROVIDER           

 

     Sliding      2022- No                       Subcutaneo           Uni

vers



     Scale      -                          us, Q3H,           ity of



     Insulin -      01:00: 18:35                          First dose           [facility]

[place]



     Lispro      00   :13                           (after           Medical



     (HumaLOG) +                                         last           Branch



     Fsbg                                         modificati           



     Testing                                         on) on 22 at           



                                                  2000,           



                                                  Until           



                                                  Discontinu           



                                                  ed,            



                                                  Routine           

 

     pantoprazol            Yes            40mg      40 mg,           Univ

ers



     e                                        Oral,           ity of



     (PROTONIX)      00:30:                               DAILY,           [place]



     EC tablet      00                                 First dose           Medi

sarah



     40 mg                                         on Wed           Branch



                                                  22 at           



                                                  1930,           



                                                  Until           



                                                  Discontinu           



                                                  ed,            



                                                  Routine           

 

     FENTanyl PF      2022- No             50ug      50 mcg,           Un

[facility]



     (SUBLIMAZE                                Intramuscu           it

y of



     (PF))      22:51: 01:01                          lar,           [place]



     injection      14   :59                           Q4HPRN,           Medical



     50 mcg                                         Starting           Branch



                                                  on 22 at           



                                                  1751,           



                                                  Until 22 at           



                                                  ,           



                                                  Routine,           



                                                  Pain           



                                                  (scale           



                                                  7-10)           

 

     Sliding      2022- No                       Subcutaneo           Uni

vers



     Scale                                us, Q3H,           ity of



     Insulin -      22:00: 00:29                          First dose           T

[facility]



     Lispro      00   :25                           on Wed           Medical



     (HumaLOG) +                                         22 at           [facility]



     Fsbg                                         1700,           



     Testing                                         Until           



                                                  Discontinu           



                                                  ed,            



                                                  Routine           

 

     acetaminoph            Yes            1000mg      1,000 mg,          

 [facility]



     en                                       Oral, Q8H,           ity of



     (TYLENOL)      21:15:                               First dose           [facility]

[facility]



     tablet      00                                 on Wed           Medical



     1,000 mg                                         22 at           [facility]

h



                                                  1615,           



                                                  Until           



                                                  Discontinu           



                                                  ed,            



                                                  Routine           

 

     FENTanyl PF      2022- No             50ug      50 mcg,           Un

[facility]



     (SUBLIMAZE                                Slow IV           ity o

f



     (PF))      21:07: 22:51                          Push,           [place]



     injection      07   :27                           Q4HPRN,           Medical



     50 mcg                                         Starting           Branch



                                                  on 22 at           



                                                  1607,           



                                                  Until 22 at           



                                                  1751,           



                                                  Routine,           



                                                  Pain           



                                                  (scale           



                                                  7-10)           

 

     insulin      2022- No             30U       30 Units,           Univ

ers



     glargine                                Subcutaneo           ity 

of



     (LANTUS      19:30: 18:47                          us, DAILY,           Eric

as



     U-100)      00   :44                           First dose           Medical



     injection                                         (after           Branch



     30 Units                                         last           



                                                  modificati           



                                                  on) on 22 at           



                                                  1430,           



                                                  Until           



                                                  Discontinu           



                                                  ed,            



                                                  Routine           

 

     glucagon            Yes            1mg       1 mg,           [facility]



     (GLUCAGEN                                     Intramuscu           ity 

of



     DIAGNOSTIC      19:19:                               lar, PRN,           Te

[facility]



     KIT)      57                                 Starting           Medical



     injection 1                                         on Wed           Branch



     mg                                           22 at           



                                                  1419,           



                                                  Until           



                                                  Discontinu           



                                                  ed, ASAP,           



                                                  Blood           



                                                  Glucose           



                                                  &amp;lt;           



                                                  or = 70           



                                                  mg/dL and           



                                                  patient is           



                                                  unable to           



                                                  swallow or           



                                                  has mental           



                                                  changes.           

 

     dextrose 50            Yes            25mL      25 mL,           Univ

ers



     % in water                                     Slow IV           ity of



     (D50W)      19:19:                               Push, PRN,           [place]



     injection      57                                 Starting           Medica

l



     25 mL                                         on Wed           Branch



                                                  22 at           



                                                  1419,           



                                                  Until           



                                                  Discontinu           



                                                  ed, ASAP,           



                                                  Blood           



                                                  Glucose           



                                                  &amp;lt;           



                                                  or = 70           



                                                  mg/dL and           



                                                  patient is           



                                                  unable to           



                                                  swallow or           



                                                  has mental           



                                                  status           



                                                  changes.           

 

     FENTanyl PF      2022- No             25ug      25 mcg,           Un

[facility]



     (SUBLIMAZE                                Slow IV           ity o

f



     (PF))      18:57: 21:09                          Push,           [place]



     injection      03   :29                           Q4HPRN,           Medical



     25 mcg                                         Starting           Branch



                                                  on 22 at           



                                                  1357,           



                                                  Until 22 at           



                                                  1609,           



                                                  Routine,           



                                                  Pain           



                                                  (scale           



                                                  7-10)           

 

     D5W IV      2022- No             1000mL      at 500           Univer

s



     infusion                                mL/hr, IV           ity o

f



     1,000 mL      18:30: 20:00                          Infusion,           Eric

as



               00   :00                           ONCE, 1           Medical



                                                  dose, On           Branch



                                                  22 at           



                                                  1330,           



                                                  Routine           

 

     NaCl 0.9%      2022- No             1000mL      at 999           Uni

vers



     (NS) bolus                                mL/hr,           ity of



     infusion      18:15: 20:19                          1,000 mL,           Eric

as



     1,000 mL      00   :00                           IV             Medical



                                                  Infusion,           Branch



                                                  ONCE, 1           



                                                  dose, On           



                                                  22 at           



                                                  1315, STAT           

 

     potassium      2022- No             20meq      20 mEq, IV           

[facility]



     chloride 20                                Piggyback,           i

ty of



     mEq/100 mL      17:45: 21:20                          Q1H, 4           [place]

[place]



     (KCL) 20      00   :00                           doses,           Medical



     mEq/100 mL                                         First dose           Bra

[facility]



     RTU IVPB 20                                         on Wed           



     mEq                                          22 at           



                                                  1245, Last           



                                                  dose on           



                                                  22 at           



                                                  1500, 100           



                                                  mL             

 

     KCL       2022- No                       IV             [facility]



     (POTASSIUM                                Infusion,           ity

 of



     CHLORIDE)      17:30: 00:27                          CONTINUOUS           T

[facility]



     40 mEq in      00   :47                           , Starting           Medi

sarah



     NaCl 0.45%                                         on Wed           Branch



     (1/2NS) IV                                         22 at           



     Solution                                         1230,           



                                                  Until 22 at           



                                                  1927,           



                                                  1,000 mL,           



                                                  at 200           



                                                  mL/hr           

 

     KCL       2022- No                       IV             [facility]



     (POTASSIUM                                Infusion,           ity

 of



     CHLORIDE)      15:45: 17:15                          CONTINUOUS           T

[facility]



     40 mEq in      00   :42                           , Starting           Medi

sarah



     NaCl 0.45%                                         on Wed           Branch



     (1/2NS) IV                                         22 at           



     Solution                                         1045,           



                                                  Until 22 at           



                                                  1215,           



                                                  1,000 mL,           



                                                  at 150           



                                                  mL/hr           

 

     ondansetron            Yes            4mg       4 mg, Slow           

[facility]



     (ZOFRAN                                     IV Push,           ity of



     (PF))      15:38:                               Q6HPRN,           [place]



     injection 4      50                                 Nausea and           Me

dical



     mg                                           Vomiting           Branch



                                                  (N/V),           



                                                  Starting           



                                                  on 22 at           



                                                  1038<br>Do           



                                                  ses of           



                                                  ondansetro           



                                                  n 16 mg           



                                                  and above           



                                                  need to be           



                                                  administer           



                                                  ed via IV           



                                                  piggyback.           



                                                  For Dose           



                                                  >=24mg ECG           



                                                  monitoring           



                                                  is             



                                                  advisable.           



                                                  <br>           

 

     enoxaparin            Yes            40mg      40 mg,           Unive

rs



     (LOVENOX)                                     Subcutaneo           ity 

of



     injection      14:00:                               us, DAILY,           Te

[facility]



     40 mg      00                                 First dose           Medical



                                                  on Wed           Branch



                                                  22 at           



                                                  0900,           



                                                  Until           



                                                  Discontinu           



                                                  ed,            



                                                  Routine           

 

     fluconazole      2022- No             200mg      at 100           Un

[facility]



     (DIFLUCAN)      7-27 07-28                          mL/hr, IV           ity

 of



     Piggyback      13:45: 00:25                          Piggyback,           T

[facility]



     200 mg      00   :36                           Q24H ABX,           Medical



                                                  First dose           Branch



                                                  on 22 at           



                                                  0845,           



                                                  Until           



                                                  Discontinu           



                                                  ed,            



                                                  ASAP<br>Do           



                                                  Not            



                                                  Refrigerat           



                                                  e.<br>           

 

     NORepinephr      2022- No             .05ug/k      0.05-1.5         

  [facility]



     ine 4 mg in                      g/min      mcg/kg/min           

ity of



     0.9% NaCl      12:21: 22:51                          ?127 kg           [place]

[facility]



     250 mL      22   :36                           (23.8125-7           Medical



     infusion                                         14.375           Branch



     RTU                                          mL/hr,           



                                                  rounded to           



                                                  23..           



                                                  38 mL/hr),           



                                                  IV             



                                                  Infusion,           



                                                  TITRATE,           



                                                  MAP Goal >           



                                                  or = 65           



                                                  mmHg,           



                                                  Starting           



                                                  on 22 at           



                                                  0721<br>In           



                                                  itiate           



                                                  titration           



                                                  at 0.05           



                                                  mcg/kg/min           



                                                  .&nbsp;&nb           



                                                  sp;Increas           



                                                  e by 0.01           



                                                  mcg/kg/min           



                                                  every 30           



                                                  seconds to           



                                                  5 minutes           



                                                  as needed           



                                                  to reach           



                                                  and            



                                                  maintain           



                                                  goal blood           



                                                  pressure.&           



                                                  nbsp;&nbsp           



                                                  ;Maximum           



                                                  dose = 1.5           



                                                  mcg/kg/min           



                                                  .&nbsp;&nb           



                                                  sp;If goal           



                                                  not            



                                                  maintained           



                                                  at maximum           



                                                  allowed           



                                                  dose,           



                                                  contact           



                                                  prescriber           



                                                  .<br>           

 

     potassium      2022- No             10meq      10 mEq, IV           

[facility]



     chloride in                                Piggyback,           i

ty of



     water 10      12:00: 14:44                          Q1H, 3           [place]



     mEq/100 mL      00   :00                           doses,           Medical



     RTU 10 mEq                                         First dose           Bra

nc



                                                  on 22 at           



                                                  0700, Last           



                                                  dose on           



                                                  22 at           



                                                  0900,           



                                                  Administer           



                                                  over 60           



                                                  Minutes,           



                                                  100 mL           

 

     aztreonam      2022- No             1000mg      1,000 mg,           

[facility]



     (AZACTAM)                                Slow IV           ity of



     injection      12:00: 12:41                          Push, Q8H           [facility]

[place]



     1,000 mg      00   :55                           ABX, First           Medic

al



                                                  dose on           [facility]anch



                                                  22 at           



                                                  0700,           



                                                  Until           



                                                  Discontinu           



                                                  ed<br>Reas           



                                                  on for           



                                                  Anti-Infec           



                                                  tive:           



                                                  Empiric           



                                                  Therapy           



                                                  for            



                                                  Suspected           



                                                  Infection<           



                                                  br>Empiric           



                                                  Therapy           



                                                  Site:           



                                                  Urine&lt;b           



                                                  r>Duration           



                                                  of             



                                                  therapy: 7           



                                                  days           

 

     NaCl 0.9%      2022- No             1000mL      at 999           Uni

vers



     (NS) IV      -                          mL/hr, IV           ity of



     infusion      11:45: 11:49                          Infusion,           Eric

as



     1,000 mL      00   :30                           ONCE, 1           Medical



                                                  dose, On           Branch



                                                  22 at           



                                                  0645,           



                                                  Routine           

 

     NaCl 0.45%      2022- No             1000mL      at 250           Un

[facility]



     (1/2NS) IV                                mL/hr,           ity of



     infusion      11:30: 14:41                          1,000 mL,           Eric

as



     1,000 mL      00   :42                           IV             Medical



                                                  Infusion,           [facility]



                                                  CONTINUOUS           



                                                  , Starting           



                                                  on 22 at           



                                                  0630,           



                                                  Until 22 at           



                                                  0941, ASAP           

 

     insulin      2022- No             0U/kg/h      0-0.3           Unive

rs



     regular in                                Units/kg/h           it

y of



     0.9 % NaCl      11:19: 19:18                          r ?127 kg           T

[place]



     (MYXREDLIN)      08   :18                           (0-38.1           Medic

al



     100                                          mL/hr), IV           [facility]



     unit/100 mL                                         Infusion,           



     (1 unit/mL)                                         TITRATE,           



     RTU IV                                         Parameters           



     infusion                                         in Admin.           



                                                  Instr.,           



                                                  Follow DKA           



                                                  Insulin           



                                                  Rate           



                                                  Adjustment           



                                                  Protocol,           



                                                  Starting           



                                                  on 22 at           



                                                  0619<br>-           



                                                  Follow           



                                                  'DKA           



                                                  Insulin           



                                                  Rate           



                                                  Adjustment           



                                                  Protocol'           



                                                  &nbsp;-           



                                                  Start           



                                                  insulin           



                                                  infusion           



                                                  at 0.1           



                                                  units/kg           



                                                  /hr. When           



                                                  adjusting           



                                                  rate, do           



                                                  not            



                                                  increase           



                                                  rate above           



                                                  0.3            



                                                  units/kg/h           



                                                  our.&nbsp;           



                                                  - Hold           



                                                  insulin           



                                                  and [facility]           



                                                  STAT if           



                                                  potassium           



                                                  is less           



                                                  than 3.3           



                                                  mEq/L.&nbs           



                                                  p;- [facility]           



                                                  STAT if           



                                                  blood           



                                                  glucose           



                                                  less than           



                                                  100 mg/dL           



                                                  or greater           



                                                  than 600           



                                                  mg/dL or           



                                                  if blood           



                                                  glucose           



                                                  not            



                                                  decreased           



                                                  by 50           



                                                  mg/dL in           



                                                  the first           



                                                  hour of           



                                                  insulin           



                                                  infusion.&           



                                                  nbsp;-           



                                                  Once blood           



                                                  glucose is           



                                                  less than           



                                                  or equal           



                                                  to 200           



                                                  mg/dL in           



                                                  patient           



                                                  with DKA           



                                                  or blood           



                                                  glucose is           



                                                  less than           



                                                  or equal           



                                                  to 300           



                                                  mg/dL in           



                                                  patient           



                                                  with HHS,           



                                                  change to           



                                                  fluids           



                                                  with           



                                                  dextrose.&           



                                                  nbsp;&amp;           



                                                  nbsp;&nbsp           



                                                  ;- If           



                                                  during           



                                                  insulin           



                                                  infusion           



                                                  and on           



                                                  dextrose           



                                                  fluids           



                                                  blood           



                                                  glucose is           



                                                  less than           



                                                  150 mg/dL           



                                                  in DKA or           



                                                  less than           



                                                  200 mg/dL           



                                                  in HHS,           



                                                  [facility] for           



                                                  increase           



                                                  in             



                                                  dextrose           



                                                  provided           



                                                  in             



                                                  continuous           



                                                  fluids.&nb           



                                                  sp;- Once           



                                                  AGAP less           



                                                  than 12,           



                                                  blood           



                                                  glucose           



                                                  less than           



                                                  200 mg/dL,           



                                                  TCO2           



                                                  greater           



                                                  than 15 x           



                                                  2 and           



                                                  patient           



                                                  ready to           



                                                  eat, [facility]O           



                                                  for SubQ           



                                                  glargine           



                                                  order two           



                                                  hours           



                                                  before           



                                                  stopping           



                                                  insulin           



                                                  infusion.<           



                                                  br>            

 

     NaCl 0.9%      2022- No             1000mL      at 999           Uni

vers



     (NS) IV                                mL/hr, IV           ity of



     infusion      08:30: 11:00                          Infusion,           Eric

as



     1,000 mL      00   :00                           ONCE, 1           Medical



                                                  dose, On           [facility]



                                                  22 at           



                                                  0330,           



                                                  Routine           

 

     NaCl 0.45%      2022- No             1000mL      at 250           Un

[place]



     (1/2NS) IV                                mL/hr,           ity of



     infusion      06:00: 11:20                          1,000 mL,           Eric

as



     1,000 mL      00   :23                           IV             Medical



                                                  Infusion,           [facility]



                                                  CONTINUOUS           



                                                  , Starting           



                                                  on 22 at           



                                                  0100,           



                                                  Until 22 at           



                                                  0620, ASAP           

 

     FENTanyl PF      2022- No             50ug      50 mcg,           Un

[facility]



     (SUBLIMAZE                                Slow IV           ity o

f



     (PF))      04:15: 03:07                          Push,           [place]



     injection      00   :00                           ONCE, 1           Medical



     50 mcg                                         dose, On           [facility]



                                                  Tue            



                                                  22 at           



                                                  2315,           



                                                  Routine           

 

     cefTRIAXone      2022- No             1000mg      1,000 mg,         

  [facility]



     (ROCEPHIN)                                Intravenou           it

y of



     1,000 mg in      04:00: 06:06                          s, ONCE, 1          

 [place]



     NaCl 0.9%      00   :00                           dose, On           Medica

l



     (NS) 50 mL                                         Tue            [facility]



     MINI-BAG                                         22 at           



                                                  2300,           



                                                  Administer           



                                                  over 30           



                                                  Minutes,           



                                                  50             



                                                  mL<br&gt;R           



                                                  [place]ira for           



                                                  Anti-Infec           



                                                  tive:           



                                                  Documented           



                                                  Infection<           



                                                  br>Documen           



                                                  huma            



                                                  Infection           



                                                  Site:           



                                                  Urine<br&g           



                                                  t;Duration           



                                                  of             



                                                  Therapy:           



                                                  Other (see           



                                                  Comments)           

 

     NaCl 0.9%      2022- No             1000mL      at 999           Uni

vers



     (NS) bolus                                mL/hr,           ity of



     infusion      03:00: 04:14                          1,000 mL,           Eric

as



     1,000 mL      00   :00                           IV             Medical



                                                  Infusion,           [facility]



                                                  ONCE, 1           



                                                  dose, On           



                                                  e            



                                                  22 at           



                                                  2200, STAT           

 

     insulin      2022- No             10U       10 Units,           Univ

ers



     regular                                Subcutaneo           ity o

f



     human      01:30: 00:37                          us, ONCE,           [place]



     (HUMULIN R)      00   :00                           1 dose, On           Me

dical



     injection                                         Tue            [facility]



     10 Units                                         22 at           



                                                  2030,           



                                                  Routine           

 

     FENTanyl PF      2022- No             50ug      50 mcg,           Un

[facility]



     (SUBLIMAZE                                Slow IV           ity o

f



     (PF))      01:30: 00:28                          Push,           [place]



     injection      00   :00                           ONCE, 1           Medical



     50 mcg                                         dose, On           [facility]



                                                  22 at           



                                                  2030,           



                                                  Routine           

 

     NaCl 0.9%      2022- No             1000mL      at 999           Uni

vers



     (NS) bolus                                mL/hr,           ity of



     infusion      00:30: 02:09                          1,000 mL,           Eric

as



     1,000 mL      00   :00                           IV             Medical



                                                  Infusion,           [facility]



                                                  ONCE, 1           



                                                  dose, On           



                                                  Tu22 at           



                                                  1930, STAT           

 

     iopamidol      2022- No        [id number] 65mL      65 mL,         

  [facility]



     (ISOVUE                 [id number]           Intravenou           ity

 of



     370-500 mL)      02:29: 02:45                          s, ONCE, 1          

 [place]



     injection      00   :00                           dose, On           Medica

l



     65 mL                                         Fri            [facility]



                                                  7/15/22 at           



                                                  2145,           



                                                  Routine           

 

     FENTanyl PF      2022- No             150ug      150 mcg,           

[facility]



     (SUBLIMAZE                                Slow IV           ity o

f



     (PF))      01:32: 01:44                          Push,           [place]



     injection      00   :00                           ONCE, 1           Medical



     150 mcg                                         dose, On           [facility]



                                                  Fri            



                                                  7/15/22 at           



                                                  2045,           



                                                  Routine           

 

     FENTanyl PF      2022- No             50ug      50 mcg,           Un

[facility]



     (SUBLIMAZE      7-16 07-15                          Slow IV           ity o

f



     (PF))      00:30: 23:32                          Push,           [place]



     injection      00   :00                           ONCE, 1           Medical



     50 mcg                                         dose, On           [facility]



                                                  Fri            



                                                  7/15/22 at           



                                                  1930,           



                                                  Routine           

 

     insulin      2022- No             10U       10 Units,           Univ

ers



     regular      7-16 07-15                          Subcutaneo           ity o

f



     human      00:30: 23:28                          us, ONCE,           [place]



     (HUMULIN R)      00   :00                           1 dose, On           Me

dical



     injection                                         Fri            [facility]



     10 Units                                         7/15/22 at           



                                                  1930,           



                                                  Routine           

 

     clindamycin      2022- No        [id number] 600mg      Take 2      

     [facility]



     300 mg      7-15 07-23           [id number]           capsules           ity of



     capsule      00:00: 04:59                          by mouth 4           [facility]

as



               00   :00                           (four)           Medical



                                                  times           [facility]



                                                  daily for           



                                                  7 days.           

 

     clindamycin      2022- No        [id number] 600mg      Take 2      

     [facility]



     300 mg      7-15 07-23           [id number]           capsules           ity of



     capsule      00:00: 04:59                          by mouth 4           [facility]

as



               00   :00                           (four)           Medical



                                                  times           [facility]



                                                  daily for           



                                                  7 days.           

 

     insulin NPH      2022- No             8U        8 Units,           U

nivers



     and regular       07-06                          Subcutaneo           i

ty of



     human 70-30      12:30: 11:30                          us, ONCE,           

[place]



     (70-30      00   :00                           1 dose, On           Medical



     U-100                                         Wed 22           [facility]anch



     INSULIN)                                         at 0730,           



     100 unit/mL                                         ASAP           



     (70-30)                                                        



     injection 8                                                        



     Units                                                        

 

     insulin            Yes       [id number] 20U       inject 20           U

nivers



     glargine      7-02                               Units           ity of



     100 unit/mL      00:00:                               under the           [facility]

[facility]



     injection      00                                 skin           Medical



                                                  daily.           [facility]anch

 

     insulin            Yes       [id number] 20U       inject 20           U

nivers



     glargine      7-02                               Units           ity of



     100 unit/mL      00:00:                               under the           [facility]

[facility]



     injection      00                                 skin           Medical



                                                  daily.           Branch

 

     insulin            Yes       [id number] 20U       inject 20           U

nivers



     glargine      7-02                               Units           ity of



     100 unit/mL      00:00:                               under the           [facility]

[facility]



     injection      00                                 skin           Medical



                                                  daily.           [facility]anch

 

     insulin            Yes       [id number] 20U       inject 20           U

nivers



     glargine      7-02                               Units           ity of



     100 unit/mL      00:00:                               under the           T

[facility]



     injection      00                                 skin           Medical



                                                  daily.           Branch

 

     insulin      2022- No        [id number] 20U       inject 20           

[facility]



     glargine      7-02 07-30                          Units           ity of



     100 unit/mL      00:00: 00:00                          under the           

[place]



     injection      00   :00                           skin           Medical



                                                  daily.           Branch

 

     insulin      2022- No        [id number] 20U       inject 20           

[facility]



     glargine       07-30                          Units           ity of



     100 unit/mL      00:00: 00:00                          under the           

[place]



     injection      00   :00                           skin           Medical



                                                  daily.           [facility]anch

 

     insulin            Yes            20U       20 Units,           Unive

rs



     glargine                                     Subcutaneo           ity o

f



     (LANTUS      14:00:                               [place], DAILY,           [place]

[facility]



     U-100)      00                                 First dose           Medical



     injection                                         (after           [facility]



     20 Units                                         last           



                                                  modificati           



                                                  on) on 22 at           



                                                  0900,           



                                                  Until           



                                                  Discontinu           



                                                  ed,            



                                                  Routine           

 

     insulin      2022- No        [id number] 26U       inject 26           

[facility]



     lispro,      -                          Units           ity of



     human, 100      00:00: 04:59                          under the           [facility]

[facility]



     unit/mL      00   :00                           skin 3           Medical



     injection                                         (three)           [facility]



                                                  times           



                                                  daily           



                                                  before           



                                                  meals for           



                                                  30 days.           

 

     insulin      2022- No        [id number] 26U       inject 26           

[facility]



     lispro,      -                          Units           ity of



     human, 100      00:00: 04:59                          under the           [facility]

[facility]



     unit/mL      00   :00                           skin 3           Medical



     injection                                         (three)           [facility]



                                                  times           



                                                  daily           



                                                  before           



                                                  meals for           



                                                  30 days.           

 

     insulin      2022- No        [id number] 26U       inject 26           

[facility]



     lispro,       08-01                          Units           ity of



     human, 100      00:00: 04:59                          under the           [facility]

[facility]



     unit/mL      00   :00                           skin 3           Medical



     injection                                         (three)           [facility]



                                                  times           



                                                  daily           



                                                  before           



                                                  meals for           



                                                  30 days.           

 

     insulin      2022- No        [id number] 26U       inject 26           

[facility]



     lispro,      -                          Units           ity of



     human, 100      00:00: 04:59                          under the           [facility]

[facility]



     unit/mL      00   :00                           skin 3           Medical



     injection                                         (three)           [facility]



                                                  times           



                                                  daily           



                                                  before           



                                                  meals for           



                                                  30 days.           

 

     insulin      2022- No        [id number] 26U       inject 26           

[facility]



     lispro,       07-30                          Units           ity of



     human, 100      00:00: 00:00                          under the           T

[facility]



     unit/mL      00   :00                           skin 3           Medical



     injection                                         (three)           [facility]



                                                  times           



                                                  daily           



                                                  before           



                                                  meals for           



                                                  30 days.           

 

     insulin      2022- No        [id number] 26U       inject 26           

[facility]



     lispro,       07-30                          Units           ity of



     human, 100      00:00: 00:00                          under the           T

[facility]



     unit/mL      00   :00                           skin 3           Medical



     injection                                         (three)           [facility]



                                                  times           



                                                  daily           



                                                  before           



                                                  meals for           



                                                  30 days.           

 

     insulin            Yes            26U       26 Units,           Unive

rs



     lispro      -30                               Subcutaneo           ity of



     (human)      22:15:                               us, TIDACErica

s



     (HumaLOG      00                                 First dose           Medic

al



     U-100)                                         (after           [facility]



     injection                                         last           



     26 Units                                         modificati           



                                                  on) on Thu           



                                                  22 at           



                                                  1715,           



                                                  Until           



                                                  Discontinu           



                                                  ed,            



                                                  Routine           

 

     insulin      2022- No             22U       22 Units,           Univ

ers



     lispro      -30                          Subcutaneo           ity of



     (human)      16:30: 21:36                          us, TIDAC,           [facility]

as



     (HumaLOG      00   :36                           First dose           Medic

al



     U-100)                                         (after           [facility]



     injection                                         last           



     22 Units                                         modificati           



                                                  on) on u           



                                                  22 at           



                                                  1130,           



                                                  Until           



                                                  Discontinu           



                                                  ed,            



                                                  Routine           

 

     insulin      2022- No             10U       10 Units,           Univ

ers



     glargine      -                          Subcutaneo           ity 

of



     (LANTUS      14:00: 17:40                          us, DAILY,           [facility]

as



     U-100)      00   :01                           First dose           Medical



     injection                                         on Thu           Branch



     10 Units                                         22 at           



                                                  0900,           



                                                  Until           



                                                  Discontinu           



                                                  ed,            



                                                  Routine           

 

     insulin            Yes            52U       52 Units,           Unive

rs



     glargine                                     Subcutaneo           ity o

f



     (LANTUS      02:00:                               us, [facility],           [place]



     U-100)      00                                 First dose           Medical



     injection                                         on Wed           Branch



     52 Units                                         22 at           



                                                  2100,           



                                                  Until           



                                                  Discontinu           



                                                  ed,            



                                                  Routine           

 

     enoxaparin            Yes            40mg      40 mg,           Unive

rs



     (LOVENOX)                                     Subcutaneo           ity 

of



     injection      22:00:                               us, DAILY,           [facility]

[facility]



     40 mg      00                                 First dose           Medical



                                                  on Wed           Branch



                                                  22 at           



                                                  1700,           



                                                  Until           



                                                  Discontinu           



                                                  ed,            



                                                  Routine           

 

     Sliding            Yes                      Subcutaneo           Univ

ers



     Scale      6-29                               us, TID           ity of



     Insulin -      17:00:                               MEALS+HS,           [facility]

as



     Lispro      00                                 First dose           Medical



     (HumaLOG) +                                         (after           [facility]anch



     Fsbg                                         last           



     Testing                                         modificati           



                                                  on) on 22 at           



                                                  1200,           



                                                  Until           



                                                  Discontinu           



                                                  ed,            



                                                  Routine           

 

     insulin       No             18U       18 Units,           Univ

ers



     lispro                                Subcutaneo           ity of



     (human)      16:30: 14:46                          us, TIDAC,           [facility]

as



     (HumaLOG      00   :32                           First dose           Medic

al



     U-100)                                         (after           Branch



     injection                                         last           



     18 Units                                         modificati           



                                                  on) on 22 at           



                                                  1130,           



                                                  Until           



                                                  Discontinu           



                                                  ed,            



                                                  Routine           

 

     amLODIPine            Yes            10mg      10 mg,           Unive

rs



     (NORVASC)                                     Oral,           ity of



     tablet 10      14:00:                               DAILY,           [place]



     mg        00                                 First dose           Medical



                                                  on Wed           Branch



                                                  22 at           



                                                  0900,           



                                                  Until           



                                                  Discontinu           



                                                  ed,            



                                                  Routine           

 

     cefTRIAXone            Yes            1000mg      1,000 mg,          

 [facility]



     (ROCEPHIN)                                     IV             ity of



     1,000 mg in      13:00:                               Piggyback,           

[place]



     NaCl 0.9%      00                                 Q24H ABX,           Medic

al



     (NS) 50 mL                                         First dose           Bra

nch



     MINI-BAG                                         on 22 at           



                                                  0800,           



                                                  Until           



                                                  Discontinu           



                                                  ed,            



                                                  Administer           



                                                  over 30           



                                                  Minutes,           



                                                  50             



                                                  mL<br>Reas           



                                                  on for           



                                                  Anti-Infec           



                                                  tive:           



                                                  Documented           



                                                  Infection<           



                                                  br>Documen           



                                                  huma            



                                                  Infection           



                                                  Site:           



                                                  Urine<br>D           



                                                  uration of           



                                                  Therapy: 7           



                                                  days           

 

     Sliding                             Subcutaneo           Uni

vers



     Scale                                , TID           ity of



     Insulin -      13:00: 15:19                          MEALS+HS,           [facility]

[facility]



     Lispro      00   :51                           First dose           Medical



     (HumaLOG) +                                         on 



     Fsbg                                         22 at           



     Testing                                         0800,           



                                                  Until           



                                                  Discontinu           



                                                  ed,            



                                                  Routine           

 

     insulin       No             15U       15 Units,           Univ

ers



     lispro                                Subcutaneo           ity of



     (human)      12:30: 15:19                          us, TIDAC,           Eric

as



     (HumaLOG      00   :51                           First dose           Medic

al



     U-100)                                         on Wed           Branch



     injection                                         22 at           



     15 Units                                         0730,           



                                                  Until           



                                                  Discontinu           



                                                  ed,            



                                                  Routine           

 

     HYDROmorpho      2022-0 2022- No             1mg       1 mg, Slow          

 [facility]



     ne         0701                          IV Push,           ity of



     (DILAUDID)      11:32: 11:31                          Q6HPRN,           Eric

as



     injection 1      00   :00                           Starting           Medi

sarah



     mg                                           on Wed           Branch



                                                  22 at           



                                                  0632,           



                                                  Until 22 at           



                                                  0631,           



                                                  Routine,           



                                                  Pain           



                                                  (scale           



                                                  7-10)<br>U           



                                                  se             



                                                  approved           



                                                  by             



                                                  (Faculty):           



                                                  [facility]            



                                                  PROVIDER           

 

     NaCl 0.9%       No             1000mL      at 999           Uni

vers



     (NS) bolus                                mL/hr,           ity of



     infusion      11:00: 11:04                          1,000 mL,           Eric

as



     1,000 mL      00   :00                           IV             Medical



                                                  Infusion,           Branch



                                                  ONCE, 1           



                                                  dose, On           



                                                  22 at           



                                                  0600, STAT           

 

     HYDROcodone            Yes            1{tbl}      1 tablet,          

 [facility]



     -acetaminop                                     Oral,           ity of



     hen (NORCO      10:54:                               Q6HPRN,           [place]

[place]



     5) 5-325 mg      27                                 Starting           Medi

sarah



     tablet 1                                         on Wed           Branch



     tablet                                         22 at           



                                                  0554,           



                                                  Until           



                                                  Discontinu           



                                                  ed,            



                                                  Routine,           



                                                  Pain           



                                                  (scale           



                                                  4-6)           

 

     dextrose            Yes            250mL      250 mL, IV           [facility]

[facility]



     10% (D10W)                                     Infusion,           ity 

of



     bolus      10:53:                               PRN - SEE           [place]



     infusion      43                                 INSTRUCTIO           Medic

al



     250 mL                                         NS,            [facility]



                                                  Administer           



                                                  over 60           



                                                  Minutes,           



                                                  hypoglycem           



                                                  ia,            



                                                  Starting           



                                                  on 22 at           



                                                  0553<br>De           



                                                  xtrose 10%           



                                                  250 mL bag           



                                                  contains:&           



                                                  nbsp;10 gm           



                                                  = 100           



                                                  mL&nbsp;20           



                                                  gm = 200           



                                                  mL&nbsp;25           



                                                  gm = 250           



                                                  mL (whole           



                                                  bag)&nbsp;           



                                                  &nbsp;The           



                                                  maximum           



                                                  rate at           



                                                  which           



                                                  dextrose           



                                                  can be           



                                                  infused           



                                                  without           



                                                  producing           



                                                  glycosuria           



                                                  is 0.5           



                                                  g/kg/hour.           



                                                  &nbsp;&nbs           



                                                  p;BUD: If           



                                                  wrapper is           



                                                  open bag           



                                                  is good           



                                                  for 30           



                                                  days at           



                                                  room           



                                                  temperatur           



                                                  e.&nbsp;<b           



                                                  r>             

 

     glucagon            Yes            1mg       1 mg,           [facility]



     (GLUCAGEN                                     Intramuscu           ity 

of



     DIAGNOSTIC      10:53:                               lar, PRN,           [facility]

[place]



     KIT)      40                                 Starting           Medical



     injection 1                                         on Wed           [facility]



     mg                                           22 at           



                                                  0553,           



                                                  Until           



                                                  Discontinu           



                                                  ed, ASAP,           



                                                  Blood           



                                                  Glucose           



                                                  &amp;lt;           



                                                  or = 70           



                                                  mg/dL and           



                                                  patient is           



                                                  unable to           



                                                  swallow or           



                                                  has mental           



                                                  changes.           

 

     acetaminoph            Yes            650mg      650 mg,           [facility]

[facility]



     en                                       Oral,           ity [facility]



     (TYLENOL)      10:52:                               Q6HPRN,           [place]



     tablet 650      33                                 Starting           Medic

al



     mg                                           on 22 at           



                                                  0552,           



                                                  Until           



                                                  Discontinu           



                                                  ed,            



                                                  Routine,           



                                                  Pain           



                                                  (scale           



                                                  1-3)           

 

     NaCl 0.9%      2022- No             1000mL      at 999           [facility]

[facility]



     (NS) bolus                                mL/hr,           ity of



     infusion      09:00: 08:10                          1,000 mL,           [facility]

[facility]



     1,000 mL      00   :00                           IV             Medical



                                                  Infusion,           [facility]



                                                  ONCE, 1           



                                                  dose, On           



                                                  22 at           



                                                  0400, STAT           

 

     FENTanyl PF      2022- No             50ug      50 mcg,           [facility]

[facility]



     (SUBLIMAZE                                Slow IV           ity o

f



     (PF))      08:22: 08:27                          Push,           [place]



     injection      00   :00                           ONCE, 1           Medical



     50 mcg                                         dose, On           [facility]



                                                  22 at           



                                                  0330,           



                                                  Routine           

 

     insulin      2022- No             10U       10 Units,           [facility]

ers



     regular                                Slow IV           ity of



     human      07:00: 05:53                          Push,           [place]



     (HUMULIN R)      00   :00                           ONCE, 1           Medic

al



     injection                                         dose, On           [facility]



     10 Units                                         22 at           



                                                  0200, STAT           

 

     FENTanyl PF      2022- No             50ug      50 mcg,           [facility]

[facility]



     (SUBLIMAZE                                Slow IV           ity o

f



     (PF))      04:15: 04:04                          Push,           [place]



     injection      00   :00                           ONCE, 1           Medical



     50 mcg                                         dose, On           [facility]



                                                  22 at           



                                                  2315, STAT           

 

     NaCl 0.9%      2022- No             1000mL      at 999           [facility]

[facility]



     (NS) bolus                                mL/hr,           ity of



     infusion      04:00: 05:53                          1,000 mL,           [facility]

[facility]



     1,000 mL      00   :00                           IV             Medical



                                                  Infusion,           [facility]



                                                  ONCE, 1           



                                                  dose, On           



                                                  22 at           



                                                  2300, STAT           

 

     insulin      2022- No             10U       10 Units,           Un[facility]

ers



     regular                                Slow IV           ity of



     human      04:00: 03:48                          Push,           [place]



     (HUMULIN R)      00   :00                           ONCE, 1           Medic

al



     injection                                         dose, On           [facility]



     10 Units                                         Tue            



                                                  22 at           



                                                  2300, STAT           

 

     amLODIPine       No        [id number] 10mg      Take 1           

[facility]



     10 mg      6-26 07-30                          tablet by           ity of



     tablet      00:00: 00:00                          mouth           [place]



               00   :00                           daily for           Medical



                                                  30 days.           [facility]

 

     amLODIPine       No        [id number] 10mg      Take 1           

[facility]



     10 mg      6-26 07-30                          tablet by           ity of



     tablet      00:00: 00:00                          mouth           [place]



               00   :00                           daily for           Medical



                                                  30 days.           [facility]

 

     amLODIPine       No        [id number] 10mg      Take 1           

[facility]



     10 mg      6-26 07-27                          tablet by           ity of



     tablet      00:00: 04:59                          mouth           [place]



               00   :00                           daily for           Medical



                                                  30 days.           [facility]

 

     amLODIPine       No        [id number] 10mg      Take 1           

[facility]



     10 mg      6-26 07-27                          tablet by           ity of



     tablet      00:00: 04:59                          mouth           [place]



               00   :00                           daily for           Medical



                                                  30 days.           [facility]

 

     amLODIPine       No        [id number] 10mg      Take 1           

[facility]



     10 mg      6-26 07-27                          tablet by           ity of



     tablet      00:00: 04:59                          mouth           [place]



               00   :00                           daily for           Medical



                                                  30 days.           [facility]

 

     amLODIPine       No        [id number] 10mg      Take 1           

[facility]



     10 mg      6-26 07-27                          tablet by           ity of



     tablet      00:00: 04:59                          mouth           [place]



               00   :00                           daily for           Medical



                                                  30 days.           [facility]

 

     amLODIPine       No        [id number] 10mg      Take 1           

[facility]



     10 mg      6-26 07-27                          tablet by           ity of



     tablet      00:00: 04:59                          mouth           [place]



               00   :00                           daily for           Medical



                                                  30 days.           [facility]

 

     amLODIPine       No        [id number] 10mg      Take 1           

[facility]



     10 mg      6-26 07-27                          tablet by           ity of



     tablet      00:00: 04:59                          mouth           [place]



               00   :00                           daily for           Medical



                                                  30 days.           [facility]

 

     amLODIPine       No        [id number] 10mg      Take 1           

[facility]



     10 mg      6-26 07-27                          tablet by           ity of



     tablet      00:00: 04:59                          mouth           [place]



               00   :00                           daily for           Medical



                                                  30 days.           [facility]

 

     HYDROmorpho      2022- No             .5mg      0.5 mg,           [facility]

[facility]



     ne                                  Slow IV           ity of



     (DILAUDID)      18:00: 17:11                          Push,           [place]



     injection      00   :00                           ONCE, 1           Medical



     0.5 mg                                         dose, On           [facility]



                                                  Sat            



                                                  22 at           



                                                  1300,           



                                                  Routine<br           



                                                  >Use           



                                                  approved           



                                                  by             



                                                  (Faculty):           



                                                  ADC            



                                                  PROVIDER           

 

     cefTRIAXone            Yes            1000mg      1,000 mg,          

 [facility]



     (ROCEPHIN)                                     IV             ity of



     1,000 mg in      15:00:                               Piggyback,           

[place]



     NaCl 0.9%      00                                 Q24H ABX,           Medic

al



     (NS) 50 mL                                         First dose           [facility]

[facility]



     MINI-BAG                                         on Sat           



                                                  22 at           



                                                  1000,           



                                                  Until           



                                                  Discontinu           



                                                  ed,            



                                                  Administer           



                                                  over 30           



                                                  Minutes,           



                                                  50             



                                                  mL<br>Reas           



                                                  on for           



                                                  Anti-Infec           



                                                  tive:           



                                                  Documented           



                                                  Infection<           



                                                  br>Documen           



                                                  huma            



                                                  Infection           



                                                  Site:           



                                                  Urine<br>D           



                                                  uration of           



                                                  Therapy: 7           



                                                  days           

 

     fluconazole            Yes            200mg      200 mg,           [facility]

[facility]



     (DIFLUCAN)                                     Oral,           ity of



     tablet 200      14:00:                               DAILY,           [place]



     mg        00                                 First dose           [facility]



                                                  on Sat           [place]



                                                  22 at           



                                                  0900,           



                                                  Until           



                                                  Discontinu           



                                                  ed,            



                                                  ASAP<br>Re           



                                                  ason for           



                                                  Anti-Infec           



                                                  tive:           



                                                  Documented           



                                                  Infection<           



                                                  br>Documen           



                                                  huma            



                                                  Infection           



                                                  Site:           



                                                  Urine<br>D           



                                                  uration of           



                                                  Therapy: 7           



                                                  days           

 

     Sliding            Yes                      Subcutaneo           Univ

ers



     Scale                                     us, TID           ity of



     Insulin -      13:00:                               MEALS+HS,           [facility]

[facility]



     Lispro      00                                 First dose           Medical



     (HumaLOG) +                                         on Sat           [place]



     Fsbg                                         22 at           



     Testing                                         0800,           



                                                  Until           



                                                  Discontinu           



                                                  ed,            



                                                  Routine           

 

     hydromorpho      2022- No             4ug       Take 4 mcg          

 [facility]



     ne HCl                                by mouth           ity of



     (HYDROMORPH      11:55: 00:00                          every 12           [facility]

[place]



     ONE ORAL)      19   :00                           (twelve)           [facility]

l



                                                  hours.           [place]

 

     insulin      2022- No             10U       10 Units,           [facility]

ers



     lispro                                Subcutaneo           ity of



     (human)      07:00: 06:53                          us, ONCE,           [place]

[facility]



     (HumaLOG      00   :00                           1 dose, On           Medic

al



     U-100)                                         Sat            [facility]



     injection                                         22 at           



     10 Units                                         0200,           



                                                  Routine           

 

     glucagon            Yes            1mg       1 mg,           [facility]



     (GLUCAGEN                                     Intramuscu           ity 

of



     DIAGNOSTIC      05:50:                               lar, PRN,           [facility]

[facility]



     KIT)      51                                 Starting           Medical



     injection 1                                         on Sat           [facility]



     mg                                           22 at           



                                                  0050,           



                                                  Until           



                                                  Discontinu           



                                                  ed, ASAP,           



                                                  Blood           



                                                  Glucose           



                                                  &amp;lt;           



                                                  or = 70           



                                                  mg/dL and           



                                                  patient is           



                                                  unable to           



                                                  swallow or           



                                                  has mental           



                                                  changes.           

 

     dextrose            Yes            250mL      250 mL, IV           [facility]

[facility]



     10% (D10W)                                     Infusion,           ity 

of



     bolus      05:50:                               PRN - SEE           [place]



     infusion      51                                 INSTRUCTIO           Medic

al



     250 mL                                         NS,            [facility]



                                                  Administer           



                                                  over 60           



                                                  Minutes,           



                                                  BS < 70,           



                                                  Starting           



                                                  on Sat           



                                                  22 at           



                                                  0050<br>De           



                                                  xtrose 10%           



                                                  250 mL bag           



                                                  contains:&           



                                                  nbsp;10 gm           



                                                  = 100           



                                                  mL&nbsp;20           



                                                  gm = 200           



                                                  mL&nbsp;25           



                                                  gm = 250           



                                                  mL (whole           



                                                  bag)&nbsp;           



                                                  &nbsp;The           



                                                  maximum           



                                                  rate at           



                                                  which           



                                                  dextrose           



                                                  can be           



                                                  infused           



                                                  without           



                                                  producing           



                                                  glycosuria           



                                                  is 0.5           



                                                  g/kg/hour.           



                                                  &nbsp;&nbs           



                                                  p;BUD: If           



                                                  wrapper is           



                                                  open bag           



                                                  is good           



                                                  for 30           



                                                  days at           



                                                  room           



                                                  temperatur           



                                                  e.&nbsp;<b           



                                                  r>             

 

     insulin            Yes            52U       52 Units,           Unive

rs



     glargine                                     Subcutaneo           ity o

f



     (LANTUS      02:00:                               us, [facility],           [place]



     U-100)      00                                 First dose           Medical



     injection                                         on Fri           Branch



     52 Units                                         22 at           



                                                  2100,           



                                                  Until           



                                                  Discontinu           



                                                  ed,            



                                                  Routine           

 

     Sliding      2022- No                       Subcutaneo           Uni

vers



     Scale      25                          us, TID           ity of



     Insulin -      02:00: 05:50                          MEALS+HS,           [place]

[place]



     Lispro      00   :40                           First dose           Medical



     (HumaLOG) +                                         on Fri           Br[facility]



     Fsbg                                         22 at           



     Testing                                         2100,           



                                                  Until           



                                                  Discontinu           



                                                  ed,            



                                                  Routine           

 

     ciprofloxac      2022- No        [id number] 500mg      Take 1         

  [facility]



     in HCl 500       07-06                          tablet by           ity

 of



     mg tablet      00:00: 04:59                          mouth           [place]



               00   :00                           every 12           Medical



                                                  (twelve)           Branch



                                                  hours for           



                                                  10 days.           

 

     ciprofloxac      2022- No        [id number] 500mg      Take 1         

  [facility]



     in HCl 500      6--                          tablet by           ity

 of



     mg tablet      00:00: 04:59                          mouth           [place]



               00   :00                           every 12           Medical



                                                  (twelve)           Branch



                                                  hours for           



                                                  10 days.           

 

     ciprofloxac      2022- No        [id number] 500mg      Take 1         

  [facility]



     in HCl 500      --                          tablet by           ity

 of



     mg tablet      00:00: 04:59                          mouth           [place]



               00   :00                           every 12           Medical



                                                  (twelve)           Branch



                                                  hours for           



                                                  10 days.           

 

     HYDROmorpho      2022- No        4647 4mg       Take 1           Uni

vers



     ne 4 mg      6-25 -                          tablet by           ity of



     tablet      00:00: 04:59                          mouth           [place]



               00   :00                           every 12           Medical



                                                  (twelve)           Branch



                                                  hours for           



                                                  7 days.           



                                                  Indication           



                                                  s: acute           



                                                  pain           

 

     HYDROmorpho      2022- No        4647 4mg       Take 1           Uni

vers



     ne 4 mg      6-25 -                          tablet by           ity of



     tablet      00:00: 04:59                          mouth           [place]



               00   :00                           every 12           Medical



                                                  (twelve)           Branch



                                                  hours for           



                                                  7 days.           



                                                  Indication           



                                                  s: acute           



                                                  pain           

 

     HYDROmorpho      2022- No        4647 4mg       Take 1           Uni

vers



     ne 4 mg      6--                          tablet by           ity of



     tablet      00:00: 04:59                          mouth           [place]



               00   :00                           every 12           Medical



                                                  (twelve)           Branch



                                                  hours for           



                                                  7 days.           



                                                  Indication           



                                                  s: acute           



                                                  pain           

 

     HYDROmorpho      -2022- No        4647 4mg       Take 1           Uni

vers



     ne 4 mg      6-25 -                          tablet by           ity of



     tablet      00:00: 04:59                          mouth           [place]



               00   :00                           every 12           Medical



                                                  (twelve)           Branch



                                                  hours for           



                                                  7 days.           



                                                  Indication           



                                                  s: acute           



                                                  pain           

 

     ciprofloxac      2022- No        [id number] 500mg      Take 1         

  [facility]



     in HCl 500      6--                          tablet by           ity

 of



     mg tablet      00:00: 00:00                          mouth           [place]



               00   :00                           every 12           Medical



                                                  (twelve)           Branch



                                                  hours for           



                                                  10 days.           

 

     NaCl 0.9%      2022- No             1000mL      at 150           Uni

vers



     (NS) bolus      6-24 06-24                          mL/hr,           ity of



     infusion      23:15: 23:38                          1,000 mL,           Eric

as



     1,000 mL      00   :00                           IV             Medical



                                                  Piggyback,           [place]



                                                  ONCE, 1           



                                                  dose, On           



                                                  22 at           



                                                  1815, STAT           

 

     HYDROmorpho      -0 - No             1mg       1 mg, Slow          

 [facility]



     ne        -24                          IV Push,           ity of



     (DILAUDID)      22:15: 21:46                          ONCE, 1           Eric

as



     injection 1      00   :00                           dose, On           Medi

[facility]



     mg                                           22 at           



                                                  1715,           



                                                  Routine<br           



                                                  >Use           



                                                  approved           



                                                  by             



                                                  (Faculty):           



                                                  ADC            



                                                  PROVIDER           

 

     enoxaparin      -0      Yes            40mg      40 mg,           Unive

rs



     (LOVENOX)                                     Subcutaneo           ity 

of



     injection      22:00:                               us, DAILY,           [facility]

[facility]



     40 mg      00                                 First dose           Medical



                                                  on 22 at           



                                                  1700,           



                                                  Until           



                                                  Discontinu           



                                                  ed,            



                                                  Routine           

 

     glucagon      -0      Yes            1mg       1 mg,           [facility]



     (GLUCAGEN                                     Intravenou           ity 

of



     DIAGNOSTIC      21:57:                               s, PRN -           Eric

as



     KIT)      53                                 SEE            Medical



     injection 1                                         INSTRUCTIO           Br

anch



     mg                                           NS,            



                                                  Starting           



                                                  on 22 at           



                                                  1657,           



                                                  Until           



                                                  Discontinu           



                                                  ed,            



                                                  Routine,           



                                                  hypoglycem           



                                                  ia             

 

     glucagon      -0      Yes            1mg       1 mg,           [facility]



     (GLUCAGEN                                     Intramuscu           ity 

of



     DIAGNOSTIC      21:57:                               lar, PRN,           Te

[facility]



     KIT)      44                                 Starting           Medical



     injection 1                                         on Fri           Branch



     mg                                           22 at           



                                                  1657,           



                                                  Until           



                                                  Discontinu           



                                                  ed, ASAP,           



                                                  Blood           



                                                  Glucose           



                                                  &amp;lt;           



                                                  or = 70           



                                                  mg/dL and           



                                                  patient is           



                                                  unable to           



                                                  swallow or           



                                                  has mental           



                                                  changes.           

 

     HYDROmorpho      -0      Yes            4mg       4 mg,           Unive

rs



     ne                                       Oral,           ity of



     (DILAUDID)      13:00:                               Q12H,           [place]



     tablet 4 mg      00                                 First dose           Me

dical



                                                  on Fri           Branch



                                                  22 at           



                                                  0800,           



                                                  Until           



                                                  Discontinu           



                                                  ed             

 

     insulin      -0      Yes            15U       15 Units,           Unive

rs



     lispro      -24                               Subcutaneo           ity of



     (human)      12:30:                               us, TIDAC,           [place]

[place]



     (HumaLOG      00                                 First dose           Medic

al



     U-100)                                         on Fri           Branch



     injection                                         22 at           



     15 Units                                         0730,           



                                                  Until           



                                                  Discontinu           



                                                  ed,            



                                                  Routine           

 

     amLODIPine      2022-0      Yes            10mg      10 mg,           Unive

rs



     (NORVASC)                                     Oral,           ity of



     tablet 10      08:45:                               DAILY,           [place]



     mg        00                                 First dose           Medical



                                                  on Fri           Branch



                                                  22 at           



                                                  0345,           



                                                  Until           



                                                  Discontinu           



                                                  ed,            



                                                  Routine           

 

     ertapenem      2022- No             1000mg      1,000 mg,           

[facility]



     (INVANZ)       0625                          IV             ity of



     1,000 mg in      08:45: 13:51                          Piggyback,          

 [place]



     NaCl 0.9%      00   :26                           Q24H ABX,           Medic

al



     (NS) 50 mL                                         First dose           [facility]

[facility]



     MINI-BAG                                         on 22 at           



                                                  0345,           



                                                  Until           



                                                  Discontinu           



                                                  ed,            



                                                  Administer           



                                                  over 30           



                                                  Minutes,           



                                                  50             



                                                  mL<br>Reas           



                                                  on for           



                                                  Anti-Infec           



                                                  tive:           



                                                  Empiric           



                                                  Therapy           



                                                  for            



                                                  Suspected           



                                                  Infection<           



                                                  br>Empiric           



                                                  Therapy           



                                                  Site:           



                                                  Urine<br>D           



                                                  uration of           



                                                  therapy: 7           



                                                  days<br>Re           



                                                  stricted           



                                                  use            



                                                  approved           



                                                  by:            



                                                  History of           



                                                  ESBL           



                                                  infection           



                                                  in past 3           



                                                  months           

 

     hydralAZINE            Yes            10mg      10 mg,           Univ

ers



     (APRESOLINE                                     Slow IV           ity o

f



     ) injection      08:33:                               Push,           [place]



     10 mg      28                                 Q6HPRN,           Medical



                                                  Starting           Branch



                                                  on 22 at           



                                                  0333,           



                                                  Until           



                                                  Discontinu           



                                                  ed,            



                                                  Routine,           



                                                  DBP=&gt;10           



                                                  0;             



                                                  SBP=>160,           



                                                  For SBP >           



                                                  160            

 

     acetaminoph            Yes            650mg      650 mg,           Un

[facility]



     en                                       Oral,           ity of



     (TYLENOL)      07:37:                               Q6HPRN,           [place]



     tablet 650      37                                 Starting           Medic

al



     mg                                           on Fri           Branch



                                                  22 at           



                                                  0237,           



                                                  Until           



                                                  Discontinu           



                                                  ed,            



                                                  Routine,           



                                                  Pain           



                                                  (scale           



                                                  1-3)           

 

     iopamidol      2022- No        [id number] 100mL      100 mL,          

 [facility]



     (ISOVUE                                Intravenou           ity o

f



     370-500 mL)      06:15: 05:05                          s, ONCE, 1          

 [place]



     injection      00   :00                           dose, On           Medica

l



     100 mL                                         Fri            Branch



                                                  22 at           



                                                  0115,           



                                                  Routine           

 

     insulin      2022- No             10U       10 Units,           Univ

ers



     regular      -24                          IV Push,           ity of



     human      06:00: 05:20                          ONCE, 1           [place]



     (HUMULIN R)      00   :00                           dose, On           Medi

[facility]



     injection                                         Fri            Branch



     10 Units                                         22 at           



                                                  0100, ASAP           

 

     FENTanyl PF      2022- No             50ug      50 mcg,           Un

[place]



     (SUBLIMAZE                                Slow IV           ity o

f



     (PF))      05:45: 04:47                          Push,           [place]



     injection      00   :00                           ONCE, 1           Medical



     50 mcg                                         dose, On           Branch



                                                  Fri            



                                                  22 at           



                                                  0045, STAT           

 

     FENTanyl PF      2022- No             50ug      50 mcg,           Un

[place]



     (SUBLIMAZE                                Slow IV           ity o

f



     (PF))      04:15: 04:00                          Push,           [place]



     injection      00   :00                           ONCE, 1           Medical



     50 mcg                                         dose, On           Branch



                                                  Thu            



                                                  22 at           



                                                  2315, STAT           

 

     NaCl 0.9%      2022- No             1000mL      at 999           Uni

vers



     (NS) bolus                                mL/hr,           ity of



     infusion      04:15: 06:41                          1,000 mL,           Eric

as



     1,000 mL      00   :00                           IV             Medical



                                                  Infusion,           [facility]



                                                  ONCE, 1           



                                                  dose, On           



                                                  Thu            



                                                  22 at           



                                                  2315, STAT           

 

     hydromorpho      0      Yes            4ug       Take 4 mcg           

[facility]



     ne HCl      6-10                               by mouth           ity of



     (HYDROMORPH      19:50:                               every 12           Te

[facility]



     ONE ORAL)      08                                 (twelve)           Medica

l



                                                  hours.           [place]

 

     magnesium            Yes            400mg      400 mg,           Univ

ers



     oxide      6-10                               Oral,           ity of



     (MAG-OX      14:00:                               DAILY,           [place]



     400) tablet      00                                 First dose           Me

dical



     400 mg                                         on Fri           Branch



                                                  6/10/22 at           



                                                  0900,           



                                                  Until           



                                                  Discontinu           



                                                  ed,            



                                                  Routine           

 

     HYDROmorpho      0 - No             1mg       1 mg, Slow          

 [facility]



     ne        6-10 06-10                          IV Push,           ity of



     (DILAUDID)      05:15: 04:42                          ONCE, 1           [facility]

as



     injection 1      00   :00                           dose, On           Medi

sarah



     mg                                           Fri            [facility]



                                                  6/10/22 at           



                                                  0015,           



                                                  Routine<br           



                                                  >Use           



                                                  approved           



                                                  by             



                                                  (Faculty):           



                                                  ADC            



                                                  PROVIDER           

 

     acetaminoph            Yes            1000mg      1,000 mg,          

 [facility]



     en        6-10                               Oral, Q8H,           ity of



     (TYLENOL)      03:00:                               First dose           [facility]

[facility]



     tablet      00                                 on u           Medical



     1,000 mg                                         22 at           [facility]



                                                  2200,           



                                                  Until           



                                                  Discontinu           



                                                  ed,            



                                                  Routine           

 

     insulin      -0      Yes       [id number] 52U       inject 52           U

nivers



     glargine      6-10                               Units           ity of



     100 unit/mL      00:00:                               under the           T

[facility]



     injection      00                                 skin at           Medical



                                                  bedtime.           [facility]anch

 

     insulin      -0      Yes       [id number] 52U       inject 52           U

nivers



     glargine      6-10                               Units           ity of



     100 unit/mL      00:00:                               under the           T

[facility]



     injection      00                                 skin at           Medical



                                                  bedtime.           Branch

 

     insulin      -0      Yes       [id number] 52U       inject 52           U

nivers



     glargine      6-10                               Units           ity of



     100 unit/mL      00:00:                               under the           T

[facility]



     injection      00                                 skin at           Medical



                                                  bedtime.           [facility]anch

 

     insulin      -0      Yes       [id number] 52U       inject 52           U

nivers



     glargine      6-10                               Units           ity of



     100 unit/mL      00:00:                               under the           T

[facility]



     injection      00                                 skin at           Medical



                                                  bedtime.           Branch

 

     insulin      -0      Yes       [id number] 52U       inject 52           U

nivers



     glargine      6-10                               Units           ity of



     100 unit/mL      00:00:                               under the           T

[facility]



     injection      00                                 skin at           Medical



                                                  bedtime.           Branch

 

     insulin      -0      Yes       [id number] 52U       inject 52           U

nivers



     glargine      6-10                               Units           ity of



     100 unit/mL      00:00:                               under the           T

[facility]



     injection      00                                 skin at           Medical



                                                  bedtime.           Branch

 

     insulin      -0      Yes       [id number] 52U       inject 52           U

nivers



     glargine      6-10                               Units           ity of



     100 unit/mL      00:00:                               under the           T

[facility]



     injection      00                                 skin at           Medical



                                                  bedtime.           Branch

 

     insulin      -0      Yes       [id number] 52U       inject 52           U

nivers



     glargine      6-10                               Units           ity of



     100 unit/mL      00:00:                               under the           T

[facility]



     injection      00                                 skin at           Medical



                                                  bedtime.           Branch

 

     insulin      -0 - No        [id number] 52U       inject 52           

[facility]



     glargine      6-10 07-30                          Units           ity of



     100 unit/mL      00:00: 00:00                          under the           

[place]



     injection      00   :00                           skin at           Medical



                                                  bedtime.           [facility]

 

     insulin      -0 - No        [id number] 52U       inject 52           

[facility]



     glargine      6-10 07-30                          Units           ity of



     100 unit/mL      00:00: 00:00                          under the           

[place]



     injection      00   :00                           skin at           Medical



                                                  bedtime.           [facility]

 

     Insulin      2022- No        [id number]           Use as           Uni

vers



     Safety      6-10 07-09                          directed           ity of



     [place],      00:00: 04:59                                         [place]



     Disp, 29      00   :00                                          Medical



     gauge x                                                        Branch



     3/16" Ndle                                                        

 

     Insulin      2022- No        [id number]           Use as           Uni

vers



     Safety      6-10 07-09                          directed           ity of



     [place],      00:00: 04:59                                         [place]



     Disp, 29      00   :00                                          Medical



     gauge x                                                        Branch



     3/16" Ndle                                                        

 

     Insulin      2022- No        [id number]           Use as           Uni

vers



     Safety      6-10 07-09                          directed           ity of



     [place],      00:00: 04:59                                         [place]



     Disp, 29      00   :00                                          Medical



     gauge x                                                        Branch



     3/16" Ndle                                                        

 

     Insulin      2022- No        [id number]           Use as           Uni

vers



     Safety      6-10 07-09                          directed           ity of



     [place],      00:00: 04:59                                         [place]



     Disp, 29      00   :00                                          Medical



     gauge x                                                        Branch



     3/16" Ndle                                                        

 

     Insulin      2022- No        [id number]           Use as           Uni

vers



     Safety      6-10 07-09                          directed           ity of



     [place],      00:00: 04:59                                         [place]



     Disp, 29      00   :00                                          Medical



     gauge x                                                        Branch



     3/16" Ndle                                                        

 

     Insulin      2022- No        [id number]           Use as           Uni

vers



     Safety      6-10 07-09                          directed           ity of



     [place],      00:00: 04:59                                         [place]



     Disp, 29      00   :00                                          Medical



     gauge x                                                        Branch



     3/16" Ndle                                                        

 

     Insulin      2022- No        [id number]           Use as           Uni

vers



     Safety      6-10 07-09                          directed           ity of



     [place],      00:00: 04:59                                         [place]



     Disp, 29      00   :00                                          Medical



     gauge x                                                        Branch



     3/16" Ndle                                                        

 

     fluconazole      2022- No        [id number] 200mg      Take 1         

  [facility]



     200 mg      6-10 06-23                          tablet by           ity of



     tablet      00:00: 04:59                          mouth           [place]



               00   :00                           daily for           Medical



                                                  12 days.           [facility]

 

     magnesium      2022- No             4g        4 g, IV           Univ

ers



     sulfate in                                Piggyback,           it

y of



     water 4      20:45: 21:58                          ONCE, 1           [place]



     gram/50 mL      00   :00                           dose, On           Medic

al



     (8 %) IV                                         Thu 22           Br[facility]

h



     Piggyback 4                                         at 1545,           



     g                                            Routine           

 

     HYDROmorpho            Yes            4mg       4 mg,           Unive

rs



     ne                                       Oral,           ity of



     (DILAUDID)      19:32:                               Q6HPRN,           [place]

[facility]



     tablet 4 mg      47                                 Starting           Medi

sar[facility]



                                                  on Thu           [facility]



                                                  22 at           



                                                  1432,           



                                                  Until           



                                                  Discontinu           



                                                  ed,            



                                                  Routine,           



                                                  Pain           



                                                  (scale           



                                                  7-10)           

 

     insulin            Yes            30U       30 Units,           Unive

rs



     glargine                                     Subcutaneo           ity o

f



     (LANTUS      02:00:                               us, QHS,           [place]



     U-100)      00                                 First dose           Medical



     injection                                         on Wed           [facility]



     30 Units                                         22 at           



                                                  2100,           



                                                  Until           



                                                  Discontinu           



                                                  ed,            



                                                  Routine           

 

     HYDROmorpho      2022- No             .5mg      0.5 mg,           Un

[facility]



     ne         06-09                          Slow IV           ity of



     (DILAUDID)      16:28: 19:33                          Push,           [place]



     injection      03   :06                           Q4HPRN,           Medical



     0.5 mg                                         Starting           [facility]



                                                  on 22 at           



                                                  1128,           



                                                  Until Thu           



                                                  22 at           



                                                  1433,           



                                                  Routine,           



                                                  Pain           



                                                  (scale           



                                                  7-10)<br>U           



                                                  se             



                                                  approved           



                                                  by             



                                                  (Faculty):           



                                                  ADC            



                                                  PROVIDER           

 

     fluconazole            Yes            200mg      200 mg,           Un

[facility]



     (DIFLUCAN)                                     Oral,           ity of



     tablet 200      14:45:                               DAILY,           [place]



     mg        00                                 First dose           Medical



                                                  on Wed           [facility]



                                                  22 at           



                                                  0945,           



                                                  Until           



                                                  Discontinu           



                                                  ed,            



                                                  ASAP<br>Re           



                                                  ason for           



                                                  Anti-Infec           



                                                  tive:           



                                                  Empiric           



                                                  Therapy           



                                                  for            



                                                  Suspected           



                                                  Infection<           



                                                  br>Empiric           



                                                  Therapy           



                                                  Site:           



                                                  Urine<br>D           



                                                  uration of           



                                                  therapy:           



                                                  72 hours           

 

     enoxaparin            Yes            40mg      40 mg,           Unive

rs



     (LOVENOX)                                     Subcutaneo           ity 

of



     injection      14:00:                               us, DAILY,           [facility]

[facility]



     40 mg      00                                 First dose           Medical



                                                  on Wed           [facility]



                                                  22 at           



                                                  0900,           



                                                  Until           



                                                  Discontinu           



                                                  ed,            



                                                  Routine           

 

     tamsulosin            Yes            .4mg      0.4 mg,           Univ

ers



     (FLOMAX)                                     Oral,           ity of



     capsule 0.4      14:00:                               DAILY,           [place]

[place]



     mg        00                                 First dose           Medical



                                                  on 22 at           



                                                  0900,           



                                                  Until           



                                                  Discontinu           



                                                  ed,            



                                                  Routine           

 

     insulin       No             10U       10 Units,           Univ

ers



     glargine                                Subcutaneo           ity 

of



     (LANTUS      12:00: 12:51                          us, ONCE,           [place]

[facility]



     U-100)      00   :00                           1 dose, On           Medical



     injection                                         22           [facility]

ch



     10 Units                                         at 0700,           



                                                  Routine           

 

     HYDROmorpho       No             1mg       1 mg, Slow          

 [facility]



     ne                                  IV Push,           ity of



     (DILAUDID)      10:00: 09:23                          ONCE, 1           [facility]

as



     injection 1      00   :00                           dose, On           Medi

[facility]



     mg                                           22           [facility]



                                                  at 0500,           



                                                  Routine<br           



                                                  >Use           



                                                  approved           



                                                  by             



                                                  (Faculty):           



                                                  ADC            



                                                  PROVIDER           

 

     HYDROmorpho      2022- No             4mg       4 mg,           Univ

ers



     ne                                  Oral,           ity of



     (DILAUDID)      08:40: 16:28                          [id number],           Te

[facility]



     tablet 4 mg      04   :14                           Starting           Medi

sar[facility]



                                                  on 22 at           



                                                  0340,           



                                                  Until 22 at           



                                                  1128,           



                                                  Routine,           



                                                  Pain           



                                                  (scale           



                                                  7-10)           

 

     HYDROmorpho      2022- No             1mg       1 mg, Slow          

 [facility]



     ne                                  IV Push,           ity of



     (DILAUDID)      06:15: 05:41                          ONCE, 1           [facility]

as



     injection 1      00   :00                           dose, On           Medi

[facility]



     mg                                           22           [facility]



                                                  at 0115,           



                                                  Routine<br           



                                                  >Use           



                                                  approved           



                                                  by             



                                                  (Faculty):           



                                                  ADC            



                                                  PROVIDER           

 

     aztreonam      2022- No             1000mg      1,000 mg,           

[facility]



     (AZACTAM)      6-08 06-10                          IV             ity of



     1,000 mg in      05:30: 16:58                          Piggyback,          

 [place]



     NaCl 0.9%      00   :42                           Q8H ABX,           Medica

l



     (NS) 100 mL                                         First dose           Br

anch



     MINI-BAG                                         on 22 at           



                                                  0030,           



                                                  Until           



                                                  Discontinu           



                                                  ed,            



                                                  Administer           



                                                  over 30           



                                                  Minutes,           



                                                  100            



                                                  mL<br>Reas           



                                                  on for           



                                                  Anti-Infec           



                                                  tive:           



                                                  Empiric           



                                                  Therapy           



                                                  for            



                                                  Suspected           



                                                  Infection<           



                                                  br>Empiric           



                                                  Therapy           



                                                  Site:           



                                                  Urine<br>D           



                                                  uration of           



                                                  therapy: 7           



                                                  days           

 

     Sliding            Yes                      Subcutaneo           [facility]

ers



     Scale      6-08                               us, Q4H,           ity of



     Insulin-Reg      05:00:                               First dose           

[place]



     ular + Fsbg      00                                 on Wed           Medica

l



     Testing                                         22 at           [facility]



                                                  0000,           



                                                  Until           



                                                  Discontinu           



                                                  ed,            



                                                  Routine           

 

     NaCl 0.9%      2022- No             1000mL      at 125           Uni

vers



     (NS) IV       06-08                          mL/hr, IV           ity of



     infusion      04:30: 16:28                          Infusion,           Eric

as



     1,000 mL      00   :27                           CONTINUOUS           Medic

al



                                                  , Starting           [facility]



                                                  on 22 at           



                                                  2330,           



                                                  Until 22 at           



                                                  1128,           



                                                  Routine           

 

     glucagon            Yes            1mg       1 mg,           [facility]



     (GLUCAGEN                                     Intravenou           ity 

of



     DIAGNOSTIC      04:10:                               s, PRN -           Eric

as



     KIT)      53                                 SEE            Medical



     injection 1                                         INSTRUCTIO           Br

anch



     mg                                           NS,            



                                                  Starting           



                                                  on 22 at           



                                                  2310,           



                                                  Until           



                                                  Discontinu           



                                                  ed,            



                                                  Routine,           



                                                  For Blood           



                                                  glucose <           



                                                  70             

 

     proCHLORper            Yes            10mg      10 mg,           Univ

ers



     azine                                     Slow IV           ity of



     (COMPAZINE)      04:10:                               Push,           [place]



     injection      40                                 Q6HPRN,           Medical



     10 mg                                         Starting           [facility]



                                                  on 22 at           



                                                  2310,           



                                                  Until           



                                                  Discontinu           



                                                  ed,            



                                                  Routine,           



                                                  Nausea and           



                                                  Vomiting           



                                                  (N/V)           

 

     FENTanyl PF      2022- No             50ug      50 mcg,           Un

[facility]



     (SUBLIMAZE      -                          Slow IV           ity o

f



     (PF))      03:04: 03:37                          Push,           [place]



     injection      00   :00                           ONCE, 1           Medical



     50 mcg                                         dose, On           Branch



                                                  22           



                                                  at 2215,           



                                                  STAT           

 

     NaCl 0.9%      2022- No             1000mL      at 999           Uni

vers



     (NS) IV       06-08                          mL/hr,           ity of



     infusion      01:16: 01:54                          Intravenou           [facility]

[facility]



     1,000 mL      00   :00                           s, ONCE, 1           Medic

al



                                                  dose, On           Branch



                                                  22           



                                                  at 2030,           



                                                  Routine           

 

     insulin      2022- No             .1U/kg      13.7 Units           U

nivers



     regular      -08                          (rounded           ity of



     human      01:15: 01:51                          from 13.74           [place]



     (HUMULIN R)      00   :00                           Units =           Medic

al



     injection                                         0.1            [facility]



     13.7 Units                                         Units/kg           



                                                  ?137.4           



                                                  kg), Slow           



                                                  IV Push,           



                                                  ONCE, 1           



                                                  dose, On           



                                                  22           



                                                  at 2030,           



                                                  STAT           

 

     FENTanyl PF      2022- No             50ug      50 mcg,           Un

[facility]



     (SUBLIMAZE       06-08                          Slow IV           ity o

f



     (PF))      01:00: 01:52                          Push,           [place]



     injection      00   :00                           ONCE, 1           Medical



     50 mcg                                         dose, On           [facility]



                                                  22           



                                                  at 2015,           



                                                  ASAP           

 

     NaCl 0.9%      2022- No             1000mL      at 999           Uni

vers



     (NS) IV       06-08                          mL/hr,           ity of



     infusion      23:47: 00:53                          Intravenou           [facility]

[facility]



     1,000 mL      00   :00                           s, ONCE, 1           Medic

al



                                                  dose, On           [facility]anch



                                                  22           



                                                  at 1900,           



                                                  ASAP           

 

     hydromorpho            Yes            4ug       Take 4 mcg           

[facility]



     ne HCl      6-07                               by mouth           ity of



     (HYDROMORPH      23:17:                               every 12           Te

[facility]



     ONE ORAL)      17                                 (twelve)           Medica

l



                                                  hours.           [facility]

 

     hydromorpho            Yes            4ug       Take 4 mcg           

[facility]



     ne HCl      6-06                               by mouth           ity of



     (HYDROMORPH      18:29:                               every 12           Te

[facility]



     ONE ORAL)      31                                 (twelve)           Medica

l



                                                  hours.           [facility]anch

 

     insulin      0      Yes        15U       inject 15           U

nivers



     lispro,      6-06                               Units           ity of



     human, 100      00:00:                               under the           Te

[facility]



     unit/mL      00                                 skin 3           Medical



     injection                                         (three)           [facility]



                                                  times           



                                                  daily           



                                                  before           



                                                  meals.           

 

     insulin      0      Yes        15U       inject 15           U

nivers



     lispro,      6-06                               Units           ity of



     human, 100      00:00:                               under the           Te

[facility]



     unit/mL      00                                 skin 3           Medical



     injection                                         (three)           [facility]



                                                  times           



                                                  daily           



                                                  before           



                                                  meals.           

 

     insulin      0      Yes        15U       inject 15           U

nivers



     lispro,      6-06                               Units           ity of



     human, 100      00:00:                               under the           Te

[facility]



     unit/mL      00                                 skin 3           Medical



     injection                                         (three)           [facility]



                                                  times           



                                                  daily           



                                                  before           



                                                  meals.           

 

     insulin      0      Yes        15U       inject 15           U

nivers



     lispro,      6-06                               Units           ity of



     human, 100      00:00:                               under the           Te

[facility]



     unit/mL      00                                 skin 3           Medical



     injection                                         (three)           [facility]



                                                  times           



                                                  daily           



                                                  before           



                                                  meals.           

 

     insulin      0      Yes        15U       inject 15           U

nivers



     lispro,      6-06                               Units           ity of



     human, 100      00:00:                               under the           Te

[facility]



     unit/mL      00                                 skin 3           Medical



     injection                                         (three)           [facility]



                                                  times           



                                                  daily           



                                                  before           



                                                  meals.           

 

     insulin      0      Yes        15U       inject 15           U

nivers



     lispro,      6-06                               Units           ity of



     human, 100      00:00:                               under the           Te

[facility]



     unit/mL      00                                 skin 3           Medical



     injection                                         (three)           [facility]



                                                  times           



                                                  daily           



                                                  before           



                                                  meals.           

 

     insulin      0      Yes        52U       inject 52           U

nivers



     glargine      6-06                               Units           ity of



     100 unit/mL      00:00:                               under the           T

[facility]



     injection      00                                 skin at           Medical



                                                  bedtime.           Branch

 

     insulin      0      Yes        15U       inject 15           U

nivers



     lispro,      6-06                               Units           ity of



     human, 100      00:00:                               under the           Te

[facility]



     unit/mL      00                                 skin 3           Medical



     injection                                         (three)           [facility]



                                                  times           



                                                  daily           



                                                  before           



                                                  meals.           

 

     insulin      0      Yes        52U       inject 52           U

nivers



     glargine      6-06                               Units           ity of



     100 unit/mL      00:00:                               under the           T

[facility]



     injection      00                                 skin at           Medical



                                                  bedtime.           Branch

 

     insulin      0      Yes        15U       inject 15           U

nivers



     lispro,      6-06                               Units           ity of



     human, 100      00:00:                               under the           Te

[facility]



     unit/mL      00                                 skin 3           Medical



     injection                                         (three)           [facility]



                                                  times           



                                                  daily           



                                                  before           



                                                  meals.           

 

     insulin      0      Yes        15U       inject 15           U

nivers



     lispro,      6-06                               Units           ity of



     human, 100      00:00:                               under the           Te

[facility]



     unit/mL      00                                 skin 3           Medical



     injection                                         (three)           [facility]



                                                  times           



                                                  daily           



                                                  before           



                                                  meals.           

 

     insulin      0      Yes        15U       inject 15           U

nivers



     lispro,      6-06                               Units           ity of



     human, 100      00:00:                               under the           Te

[facility]



     unit/mL      00                                 skin 3           Medical



     injection                                         (three)           [facility]



                                                  times           



                                                  daily           



                                                  before           



                                                  meals.           

 

     insulin      2022- No         15U       inject 15           

[facility]



     lispro,      6-06 07-30                          Units           ity of



     human, 100      00:00: 00:00                          under the           T

[facility]



     unit/mL      00   :00                           skin 3           Medical



     injection                                         (three)           [facility]



                                                  times           



                                                  daily           



                                                  before           



                                                  meals.           

 

     insulin      2022- No        [id number] 15U       inject 15           

[facility]



     lispro,       07-30                          Units           ity of



     human, 100      00:00: 00:00                          under the           T

[facility]



     unit/mL      00   :00                           skin 3           Medical



     injection                                         (three)           [facility]



                                                  times           



                                                  daily           



                                                  before           



                                                  meals.           

 

     insulin      2022- No        [id number] 52U       inject 52           

[facility]



     glargine       06-10                          Units           ity of



     100 unit/mL      00:00: 00:00                          under the           

[place]



     injection      00   :00                           skin at           Medical



                                                  bedtime.           [facility]anch

 

     HYDROmorpho      2022- No             .5mg      0.5 mg,           Un

[facility]



     ne         06-06                          Slow IV           ity of



     (DILAUDID)      18:15: 18:14                          Push,           [place]



     injection      00   :00                           Q8HPRN,           Medical



     0.5 mg                                         Starting           [facility]



                                                  on Sun           



                                                  22 at           



                                                  1315,           



                                                  Until Mon           



                                                  22 at           



                                                  1314,           



                                                  Routine,           



                                                  Pain           



                                                  (scale           



                                                  7-10)<br>U           



                                                  se             



                                                  approved           



                                                  by             



                                                  (Faculty):           



                                                  [facility]            



                                                  PROVIDER           

 

     insulin            Yes            .4U/kg/      52 Units           Uni

vers



     glargine      6-05                     d         (rounded           ity of



     (LANTUS      14:43:                               from 51.6           [place]



     U-100)      43                                 Units =           Medical



     injection                                         0.4            [facility]



     52 Units                                         Units/kg/d           



                                                  ay ?129           



                                                  kg),           



                                                  Subcutaneo           



                                                  us, Q24H,           



                                                  First dose           



                                                  on Sun           



                                                  22 at           



                                                  0944,           



                                                  Until           



                                                  Discontinu           



                                                  ed,            



                                                  Routine           

 

     HYDROmorpho            Yes            4mg       4 mg,           Unive

rs



     ne        6-05                               Oral,           ity of



     (DILAUDID)      02:21:                               Q6HPRN,           [place]

[facility]



     tablet 4 mg      55                                 Starting           Medi

[facility]



                                                  on Sat           [facility]



                                                  22 at           



                                                  2121,           



                                                  Until           



                                                  Discontinu           



                                                  ed,            



                                                  Routine,           



                                                  Pain           



                                                  (scale           



                                                  4-6)           

 

     HYDROmorpho      2022- No             1mg       1 mg, Slow          

 [facility]



     ne         06-05                          IV Push,           ity of



     (DILAUDID)      02:21: 18:05                          Q4HPRN,           [facility]

as



     injection 1      39   :59                           Starting           Medi

[facility]



     mg                                           on Sat           Branch



                                                  22 at           



                                                  2121,           



                                                  Until Sun           



                                                  22 at           



                                                  1305,           



                                                  Routine,           



                                                  Pain           



                                                  (scale           



                                                  7-10)<br>U           



                                                  se             



                                                  approved           



                                                  by             



                                                  (Faculty):           



                                                  [facility]            



                                                  PROVIDER           

 

     Sliding            Yes                      Subcutaneo           [facility]

ers



     Scale      6-04                               us, Q4H,           ity of



     Insulin -      17:00:                               First dose           Te

[facility]



     lispro      00                                 on Sat           Medical



     (humaLOG) +                                         22 at           [facility]



     Fsbg                                         1200,           



     Testing                                         Until           



                                                  Discontinu           



                                                  ed,            



                                                  Routine           

 

     insulin            Yes            .35U/kg      15 Units           Uni

vers



     lispro      6-04                     /d        (rounded           ity of



     (human)      17:00:                               from 15.05           [place]

[facility]



     (HumaLOG      00                                 Units =           Medical



     U-100)                                         0.35           Branch



     injection                                         Units/kg/d           



     15 Units                                         ay ?129           



                                                  kg),           



                                                  Subcutaneo           



                                                  us, TID           



                                                  MEALS,           



                                                  First dose           



                                                  on Sat           



                                                  22 at           



                                                  1200,           



                                                  Until           



                                                  Discontinu           



                                                  ed,            



                                                  Routine           

 

     proMETHazin            Yes            25mg      25 mg,           Univ

ers



     e                                        Oral,           ity of



     (PHENERGAN)      15:09:                               Q6HPRN,           Eric

as



     tablet 25      59                                 Starting           Medica

l



     mg                                           on Sat           Branch



                                                  22 at           



                                                  1009,           



                                                  Until           



                                                  Discontinu           



                                                  ed,            



                                                  Routine,           



                                                  Nausea and           



                                                  Vomiting           



                                                  (N/V)           

 

     HYDROmorpho      2022- No             4mg       4 mg,           Univ

ers



     ne         06-05                          Oral,           ity of



     (DILAUDID)      14:47: 02:22                          Q6HPRN,           Eric

as



     tablet 4 mg      19   :08                           Starting           Medi

sarah



                                                  on Sat           Branch



                                                  22 at           



                                                  0947,           



                                                  Until Sat           



                                                  22 at           



                                                  2122,           



                                                  Routine,           



                                                  Pain           



                                                  (scale           



                                                  7-10)           

 

     potassium      2022- No             20meq      20 mEq, IV           

[facility]



     chloride 20                                Piggyback,           i

ty of



     mEq/100 mL      23:45: 03:11                          Q2H, 2           [place]

[facility]



     (KCL) 20      00   :00                           doses,           Medical



     mEq/100 mL                                         First dose           [facility]



     RTU IVPB 20                                         on Fri           



     mEq                                          6/3/22 at           



                                                  1845, Last           



                                                  dose on           



                                                  Fri 6/3/22           



                                                  at 2000,           



                                                  100 mL           

 

     HYDROmorpho      2022- No             1mg       1 mg, Slow          

 [facility]



     ne                                  IV Push,           ity of



     (DILAUDID)      18:08: 14:45                          Q4HPRN,           Eric

as



     injection 1      31   :34                           Starting           Medi

sarah



     mg                                           on Fri           Branch



                                                  6/3/22 at           



                                                  1308,           



                                                  Until Sat           



                                                  22 at           



                                                  0945,           



                                                  Routine,           



                                                  Pain           



                                                  (scale           



                                                  7-10)<br>U           



                                                  se             



                                                  approved           



                                                  by             



                                                  (Faculty):           



                                                  CLC            



                                                  PROVIDER           

 

     enoxaparin            Yes            40mg      40 mg,           Unive

rs



     (LOVENOX)                                     Subcutaneo           ity 

of



     injection      14:00:                               us, DAILY,           [facility]

[place]



     40 mg      00                                 First dose           Medical



                                                  on Fri           Branch



                                                  6/3/22 at           



                                                  0900,           



                                                  Until           



                                                  Discontinu           



                                                  ed,            



                                                  Routine           

 

     lactobacill            Yes            .5mg      0.5 mg,           Uni

vers



     us                                       Oral, BID,           ity of



     acidophilus      13:00:                               First dose           

[place]



     tablet 0.5      00                                 on Fri           Medical



     mg                                           6/3/22 at           Branch



                                                  0800,           



                                                  Until           



                                                  Discontinu           



                                                  ed,            



                                                  Routine           

 

     docusate            Yes            100mg      100 mg,           Unive

rs



     (COLACE)                                     Oral, BID,           ity o

f



     capsule 100      13:00:                               First dose           

[place]



     mg        00                                 on Fri           Medical



                                                  6/3/22 at           Branch



                                                  0800,           



                                                  Until           



                                                  Discontinu           



                                                  ed,            



                                                  Routine           

 

     cefTRIAXone       No             1000mg      1,000 mg,         

  [facility]



     (ROCEPHIN)                                IV             ity of



     1,000 mg in      11:30: 16:04                          Piggyback,          

 [place]



     NaCl 0.9%      00   :40                           Q24H ABX,           Medic

al



     (NS) 50 mL                                         First dose           Bra

nch



     MINI-BAG                                         on Fri           



                                                  6/3/22 at           



                                                  0630,           



                                                  Until           



                                                  Discontinu           



                                                  ed,            



                                                  Administer           



                                                  over 30           



                                                  Minutes,           



                                                  50             



                                                  mL<br>Reas           



                                                  on for           



                                                  Anti-Infec           



                                                  tive:           



                                                  Documented           



                                                  Infection<           



                                                  br>Documen           



                                                  huma            



                                                  Infection           



                                                  Site:           



                                                  Urine<br>D           



                                                  uration of           



                                                  Therapy: 7           



                                                  days           

 

     proMETHazin      2022- No             25mg      25 mg, IV           

[facility]



     e                                   Piggyback,           ity of



     (PHENERGAN)      11:22: 15:10                          Q6HPRN,           Te

[place]



     25 mg in      38   :15                           Starting           Medical



     NaCl 0.9%                                         on Fri           Branch



     (NS) 50 mL                                         6/3/22 at           



     IV                                           0622,           



     piggyback                                         Until Sat           



                                                  22 at           



                                                  1010,           



                                                  Routine,           



                                                  Nausea and           



                                                  Vomiting           



                                                  (N/V)           

 

     acetaminoph            Yes            650mg      650 mg,           Un

[place]



     en                                       Oral,           ity of



     (TYLENOL)      11:04:                               Q6HPRN,           [place]



     tablet 650      03                                 Starting           Medic

al



     mg                                           on Fri           Branch



                                                  6/3/22 at           



                                                  0604,           



                                                  Until           



                                                  Discontinu           



                                                  ed,            



                                                  Routine,           



                                                  Pain           



                                                  (scale           



                                                  1-3), Temp           



                                                  > 38.5 C           

 

     HYDROmorpho      2022- No             2mg       2 mg, Slow          

 [facility]



     ne                                  IV Push,           ity of



     (DILAUDID)      11:03: 18:08                          Q4HPRN,           Eric

as



     injection 2      22   :44                           Starting           Medi

sarah



     mg                                           on Fri           Branch



                                                  6/3/22 at           



                                                  0603,           



                                                  Until Fri           



                                                  6/3/22 at           



                                                  1308,           



                                                  Routine,           



                                                  Pain           



                                                  (scale           



                                                  7-10)<br>U           



                                                  se             



                                                  approved           



                                                  by             



                                                  (Faculty):           



                                                  CLC            



                                                  PROVIDER           

 

     NaCl 0.9%            Yes            10mL      10 mL,           Univer

s



     (NS)                                     Slow IV           ity of



     injection      10:58:                               Push, PRN,           Te

[facility]



     10 mL      34                                 Starting           Medical



                                                  on Fri           Branch



                                                  6/3/22 at           



                                                  0558,           



                                                  Until           



                                                  Discontinu           



                                                  ed,            



                                                  Routine,           



                                                  line           



                                                  maintenanc           



                                                  e              

 

     NaCl 0.45%      2022- No             1000mL                     Univ

ers



     (1/2NS)                                               ity of



     1000 mL +      10:30: 14:45                                         [place]



     KCL 20 mEq      00   :34                                          Medical



                                                                 Branch

 

     D5W 0.45%      2022- No                       IV             [facility]



     NaCl                                Infusion,           ity of



     (1/2NS) 1 L      10:21: 14:45                          at 200           Eric

as



     + KCL 20      33   :34                           mL/hr, PRN           Medic

al



     mEq                                          - SEE           Branch



                                                  INSTRUCTIO           



                                                  NS,            



                                                  Starting           



                                                  on Fri           



                                                  6/3/22 at           



                                                  0521,           



                                                  Until Sat           



                                                  22 at           



                                                  0945,           



                                                  ASAP,           



                                                  Blood           



                                                  glucose           



                                                  control           

 

     acetaminoph      2022- No             1000mg      1,000 mg,         

  [facility]



     en                                  Oral,           ity of



     (TYLENOL)      08:45: 07:36                          ONCE, 1           [place]

[facility]



     tablet      00   :00                           dose, On           Medical



     1,000 mg                                         Fri 6/3/22           Branc

h



                                                  at 0345,           



                                                  ASAP           

 

     insulin       No             8U        8 Units,           Unive

rs



     regular                                Slow IV           ity of



     human      08:15: 07:13                          Push,           [place]



     (HUMULIN R)      00   :00                           ONCE, 1           Medic

al



     injection 8                                         dose, On           Bran

ch



     Units                                         Fri 6/3/22           



                                                  at 0315,           



                                                  Routine           

 

     NaCl 0.9%       No             1000mL      at 999           Uni

vers



     (NS) bolus                                mL/hr,           ity of



     infusion      07:15: 06:12                          1,000 mL,           Eric

as



     1,000 mL      00   :00                           IV             Medical



                                                  Infusion,           Branch



                                                  ONCE, 1           



                                                  dose, On           



                                                  Fri 6/3/22           



                                                  at 0215,           



                                                  STAT           

 

     insulin       No             8U        8 Units,           Unive

rs



     regular                                Slow IV           ity of



     human      07:15: 06:21                          Push,           [place]



     (HUMULIN R)      00   :00                           ONCE, 1           Medic

al



     injection 8                                         dose, On           B[facility]

ch



     Units                                         Fri 6/3/22           



                                                  at 0215,           



                                                  STAT           

 

     iopamidol       No        [id number] 100mL      100 mL,          

 [facility]



     (ISOVUE                                Intravenou           ity o

f



     370-500 mL)      05:45: 04:31                          s, ONCE, 1          

 [place]



     injection      00   :00                           dose, On           Medica

l



     100 mL                                         Fri 6/3/22           Branch



                                                  at 0045,           



                                                  Routine           

 

     meropenem       No             500mg      500 mg, IV           

[facility]



     (MERREM)                                Piggyback,           ity 

of



     500 mg in      05:00: 05:32                          ONCE, 1           [place]

[facility]



     NaCl 0.9%      00   :00                           dose, On           Medica

l



     (NS) 50 mL                                         Fri 6/3/22           [facility]



     MINI-BAG                                         at 0000,           



                                                  Administer           



                                                  over 30           



                                                  Minutes,           



                                                  50             



                                                  mL<br&gt;R           



                                                  estricted           



                                                  use            



                                                  approved           



                                                  by:            



                                                  EMERGENCY           



                                                  DEPARTMENT           



                                                  PRESCRIBER           



                                                  <br>Reason           



                                                  for            



                                                  Anti-Infec           



                                                  tive:           



                                                  Empiric           



                                                  Therapy           



                                                  for            



                                                  Suspected           



                                                  Infection<           



                                                  br>Empiric           



                                                  Therapy           



                                                  Site:           



                                                  Abdominal<           



                                                  br>Duratio           



                                                  n of           



                                                  therapy:           



                                                  72 hours           

 

     proMETHazin      2022- No             25mg      25 mg, IV           

[facility]



     e                                   Piggyback,           ity of



     (PHENERGAN)      04:45: 03:49                          ONCE, 1           Te

[place]



     25 mg in      00   :00                           dose, On           Medical



     NaCl 0.9%                                         Thu 22           Bran

ch



     (NS) 50 mL                                         at 2345,           



     IV                                           ASAP           



     piggyback                                                        

 

     NaCl 0.9%       No             1000mL      at 999           Uni

vers



     (NS) bolus       0603                          mL/hr,           ity of



     infusion      04:30: 06:04                          1,000 mL,           Eric

as



     1,000 mL      00   :00                           IV             Medical



                                                  Infusion,           Branch



                                                  ONCE, 1           



                                                  dose, On           



                                                  Thu 22           



                                                  at 2330,           



                                                  STAT           

 

     FENTanyl PF      2022- No             100ug      100 mcg,           

[facility]



     (SUBLIMAZE      03                          Slow IV           ity o

f



     (PF))      03:30: 03:24                          Push,           [place]



     injection      00   :00                           ONCE, 1           Medical



     100 mcg                                         dose, On           [facility] 22           



                                                  at 2230,           



                                                  STAT           

 

     cefTRIAXone            Yes            2000mg      2,000 mg,          

 [facility]



     (ROCEPHIN)      5-17                               Intramuscu           ity

 of



     injection      21:00:                               lar, Q24H,           Te

[place]



     2,000 mg      00                                 First dose           Medic

al



                                                  on Tue           [place]



                                                  22 at           



                                                  1600,           



                                                  Until           



                                                  Discontinu           



                                                  ed,            



                                                  ASAP<br>Re           



                                                  ason for           



                                                  Anti-Infec           



                                                  tive:           



                                                  Documented           



                                                  Infection<           



                                                  br>Documen           



                                                  huma            



                                                  Infection           



                                                  Site: Skin           



                                                  / Soft           



                                                  Tissue<br>           



                                                  Duration           



                                                  of             



                                                  Therapy:           



                                                  Other (see           



                                                  Comments)           

 

     hydromorpho            Yes            4ug       Take 4 mcg           

[facility]



     ne HCl      5-17                               by mouth           [facility]



     (HYDROMORPH      17:43:                               every 12           [facility]

[place]



     ONE ORAL)      55                                 (twelve)           [facility]

[facility]



                                                  hours.           [facility]

 

     hydromorpho            Yes            4ug       Take 4 mcg           

[facility]



     ne HCl      5-17                               by mouth           [facility]



     (HYDROMORPH      17:43:                               every 12           [facility]

[place]



     ONE ORAL)      55                                 (twelve)           [facility]

[facility]



                                                  hours.           [facility]

 

     collagenase            Yes       [id number]           Apply to         

  [facility]



     250       5-17                               affected           [facility]



     unit/gram      00:00:                               area(s)           [place]



     ointment      00                                 daily.           [facility]



                                                                 [facility]

 

     collagenase            Yes       [id number]           Apply to         

  [facility]



     250       5-17                               affected           ity of



     unit/gram      00:00:                               area(s)           [place]



     ointment      00                                 daily.           [facility]



                                                                 [facility]

 

     collagenase      2022-0      Yes       [id number]           Apply to         

  [facility]



     250       5-17                               affected           ity of



     unit/gram      00:00:                               area(s)           [place]



     ointment      00                                 daily.           [facility]



                                                                 [facility]

 

     collagenase      -0      Yes       [id number]           Apply to         

  [facility]



     250       5-17                               affected           [facility] of



     unit/gram      00:00:                               area(s)           [place]



     ointment      00                                 daily.           [facility]



                                                                 [facility]

 

     collagenase      -0      Yes       [id number]           Apply to         

  [facility]



     250       5-17                               affected           ity of



     unit/gram      00:00:                               area(s)           [place]



     ointment      00                                 daily.           [facility]



                                                                 [facility]

 

     collagenase      -0      Yes       [id number]           Apply to         

  [facility]



     250       5-17                               affected           ity of



     unit/gram      00:00:                               area(s)           [place]



     ointment      00                                 daily.           [facility]



                                                                 [facility]

 

     collagenase      -0      Yes       [id number]           Apply to         

  [facility]



     250       5-17                               affected           ity of



     unit/gram      00:00:                               area(s)           [place]



     ointment      00                                 daily.           [facility]



                                                                 [facility]

 

     collagenase      -0      Yes       [id number]           Apply to         

  [facility]



     250       5-17                               affected           ity of



     unit/gram      00:00:                               area(s)           [place]



     ointment      00                                 daily.           [facility]



                                                                 [facility]

 

     collagenase      -0      Yes       [id number]           Apply to         

  [facility]



     250       5-17                               affected           ity of



     unit/gram      00:00:                               area(s)           [place]



     ointment      00                                 daily.           [facility]



                                                                 [facility]

 

     collagenase      -0      Yes       [id number]           Apply to         

  [facility]



     250       5-17                               affected           ity of



     unit/gram      00:00:                               area(s)           [place]



     ointment      00                                 daily.           [facility]



                                                                 [facility]

 

     collagenase      -0      Yes       [id number]           Apply to         

  [facility]



     250       5-17                               affected           ity of



     unit/gram      00:00:                               area(s)           [place]



     ointment      00                                 daily.           [facility]



                                                                 [facility]

 

     collagenase      -0      Yes       [id number]           Apply to         

  [facility]



     250       5-17                               affected           ity of



     unit/gram      00:00:                               area(s)           [place]



     ointment      00                                 daily.           [facility]



                                                                 [facility]

 

     collagenase      -0      Yes       [id number]           Apply to         

  [facility]



     250       5-17                               affected           ity of



     unit/gram      00:00:                               area(s)           [place]



     ointment      00                                 daily.           [facility]



                                                                 [facility]

 

     collagenase      -0      Yes       [id number]           Apply to         

  [facility]



     250       5-17                               affected           ity of



     unit/gram      00:00:                               area(s)           [place]



     ointment      00                                 daily.           [facility]



                                                                 [facility]

 

     collagenase      -0      Yes       [id number]           Apply to         

  [facility]



     250       5-17                               affected           ity of



     unit/gram      00:00:                               area(s)           [place]



     ointment      00                                 daily.           [facility]



                                                                 [facility]

 

     collagenase      -0      Yes       [id number]           Apply to         

  [facility]



     250       5-17                               affected           ity of



     unit/gram      00:00:                               area(s)           [place]



     ointment      00                                 daily.           [facility]



                                                                 [facility]

 

     collagenase      -0      Yes       [id number]           Apply to         

  [facility]



     250       5-17                               affected           ity of



     unit/gram      00:00:                               area(s)           [place]



     ointment      00                                 daily.           [facility]



                                                                 [facility]

 

     collagenase      -0      Yes       [id number]           Apply to         

  [facility]



     250       5-17                               affected           ity of



     unit/gram      00:00:                               area(s)           [place]



     ointment      00                                 daily.           [facility]



                                                                 [facility]

 

     collagenase      -0      Yes       [id number]           Apply to         

  [facility]



     250       5-17                               affected           ity of



     unit/gram      00:00:                               area(s)           [place]



     ointment      00                                 daily.           [facility]



                                                                 [facility]

 

     collagenase      -0      Yes       [id number]           Apply to         

  [facility]



     250       5-17                               affected           ity of



     unit/gram      00:00:                               area(s)           [place]



     ointment      00                                 daily.           [facility]



                                                                 [facility]

 

     collagenase      -0      Yes       [id number]           Apply to         

  [facility]



     250       5-17                               affected           ity of



     unit/gram      00:00:                               area(s)           [place]



     ointment      00                                 daily.           [facility]



                                                                 [facility]

 

     collagenase      -0      Yes       [id number]           Apply to         

  [facility]



     250       5-17                               affected           ity of



     unit/gram      00:00:                               area(s)           [place]



     ointment      00                                 daily.           [facility]



                                                                 [facility]

 

     collagenase      -0      Yes       [id number]           Apply to         

  [facility]



     250       5-17                               affected           ity of



     unit/gram      00:00:                               area(s)           [place]



     ointment      00                                 daily.           [facility]



                                                                 [facility]

 

     collagenase      -0      Yes       [id number]           Apply to         

  [facility]



     250       5-17                               affected           ity of



     unit/gram      00:00:                               area(s)           [place]



     ointment      00                                 daily.           [facility]



                                                                 [facility]

 

     collagenase      -0      Yes       [id number]           Apply to         

  [facility]



     250       5-17                               affected           ity of



     unit/gram      00:00:                               area(s)           [place]



     ointment      00                                 daily.           [facility]



                                                                 [facility]

 

     collagenase      -0      Yes       [id number]           Apply to         

  [facility]



     250       5-17                               affected           ity of



     unit/gram      00:00:                               area(s)           [place]



     ointment      00                                 daily.           [facility]



                                                                 [facility]

 

     collagenase      -0      Yes       [id number]           Apply to         

  [facility]



     250       5-17                               affected           ity of



     unit/gram      00:00:                               area(s)           [place]



     ointment      00                                 daily.           [facility]



                                                                 [facility]

 

     collagenase            Yes       [id number]           Apply to         

  [facility]



     250       5-17                               affected           ity of



     unit/gram      00:00:                               area(s)           [place]



     ointment      00                                 daily.           [facility]



                                                                 [facility]

 

     collagenase            Yes       [id number]           Apply to         

  [facility]



     250       5-17                               affected           ity of



     unit/gram      00:00:                               area(s)           [place]



     ointment      00                                 daily.           [facility]



                                                                 [facility]

 

     collagenase            Yes       [id number]           Apply to         

  [facility]



     250       5-17                               affected           ity of



     unit/gram      00:00:                               area(s)           [place]



     ointment      00                                 daily.           [facility]



                                                                 [facility]

 

     collagenase            Yes       [id number]           Apply to         

  [facility]



     250       5-17                               affected           ity of



     unit/gram      00:00:                               area(s)           [place]



     ointment      00                                 daily.           [facility]



                                                                 [facility]

 

     collagenase            Yes       [id number]           Apply to         

  [facility]



     250       5-17                               affected           ity of



     unit/gram      00:00:                               area(s)           [place]



     ointment      00                                 daily.           [facility]



                                                                 [facility]

 

     collagenase            Yes       [id number]           Apply to         

  [facility]



     250       5-17                               affected           ity of



     unit/gram      00:00:                               area(s)           [place]



     ointment      00                                 daily.           [facility]



                                                                 [facility]

 

     collagenase            Yes       [id number]           Apply to         

  [facility]



     250       5-17                               affected           ity of



     unit/gram      00:00:                               area(s)           [place]



     ointment      00                                 daily.           [facility]



                                                                 [facility]

 

     collagenase            Yes       [id number]           Apply to         

  [facility]



     250       5-17                               affected           ity of



     unit/gram      00:00:                               area(s)           [place]



     ointment      00                                 daily.           [facility]



                                                                 [facility]

 

     docusate      2022- No        [id number] 100mg      Take 1           [facility]

[facility]



     100 mg                                capsule by           [facility] of



     capsule      00:00: 04:59                          mouth 2           [place]



               00   :00                           (two)           [facility]



                                                  times           [facility]



                                                  daily for           



                                                  30 days.           

 

     lactobacill      2022- No        [id number] .5mg      Take 1          

 [facility]



     us                                  tablet by           [facility]



     acidophilus      00:00: 04:59                          mouth 2           [facility]

[place]



               00   :00                           (two)           [facility]



                                                  times           [facility]



                                                  daily for           



                                                  30 days.           

 

     sennosides      2022- No        [id number] 8.6mg      Take 1          

 [facility]



     8.6 mg      -17 06-17                          tablet by           [facility]



     tablet      00:00: 04:59                          mouth 2           [place]



               00   :00                           (two)           [facility]



                                                  times           [facility]



                                                  daily for           



                                                  30 days.           

 

     tamsulosin      2022- No        [id number] .4mg      Take 1           

[facility]



     0.4 mg 24      5-17 06-17                          capsule by           ity

 of



     hr capsule      00:00: 04:59                          mouth           [place]



               00   :00                           daily for           [facility]



                                                  30 days.           [facility]

 

     docusate      2022- No        [id number] 100mg      Take 1           [facility]

[facility]



     100 mg      5-17 06-17                          capsule by           ity of



     capsule      00:00: 04:59                          mouth 2           [place]



               00   :00                           (two)           [facility]



                                                  times           [facility]



                                                  daily for           



                                                  30 days.           

 

     lactobacill      2022- No        [id number] .5mg      Take 1          

 [facility]



     us        5-17 06-17                          tablet by           ity [facility]



     acidophilus      00:00: 04:59                          mouth 2           [facility]

[place]



               00   :00                           (two)           [facility]



                                                  times           [facility]



                                                  daily for           



                                                  30 days.           

 

     sennosides      2022- No        [id number] 8.6mg      Take 1          

 [facility]



     8.6 mg      5-17 06-17                          tablet by           ity of



     tablet      00:00: 04:59                          mouth 2           [place]



               00   :00                           (two)           [facility]



                                                  times           [facility]



                                                  daily for           



                                                  30 days.           

 

     tamsulosin      2022- No        [id number] .4mg      Take 1           

[facility]



     0.4 mg 24      5-17 06-17                          capsule by           ity

 of



     hr capsule      00:00: 04:59                          mouth           [place]



               00   :00                           daily for           [facility]



                                                  30 days.           [facility]

 

     docusate      2022- No        [id number] 100mg      Take 1           [facility]

[facility]



     100 mg      5-17 06-17                          capsule by           ity of



     capsule      00:00: 04:59                          mouth 2           [place]



               00   :00                           (two)           [facility]



                                                  times           [facility]



                                                  daily for           



                                                  30 days.           

 

     lactobacill      2022- No        [id number] .5mg      Take 1          

 [facility]



     us        5-17 06-17                          tablet by           ity of



     acidophilus      00:00: 04:59                          mouth 2           [facility]

[place]



               00   :00                           (two)           [facility]



                                                  times           [facility]



                                                  daily for           



                                                  30 days.           

 

     sennosides      2022- No        [id number] 8.6mg      Take 1          

 [facility]



     8.6 mg      5-17 06-17                          tablet by           ity of



     tablet      00:00: 04:59                          mouth 2           [place]



               00   :00                           (two)           [facility]



                                                  times           [facility]



                                                  daily for           



                                                  30 days.           

 

     tamsulosin      2022- No        [id number] .4mg      Take 1           

[facility]



     0.4 mg 24      5-17 06-17                          capsule by           ity

 of



     hr capsule      00:00: 04:59                          mouth           [place]



               00   :00                           daily for           [facility]



                                                  30 days.           [facility]

 

     docusate      2022- No        [id number] 100mg      Take 1           [facility]

[facility]



     100 mg      5-17 06-17                          capsule by           ity of



     capsule      00:00: 04:59                          mouth 2           [place]



               00   :00                           (two)           [facility]



                                                  times           [facility]



                                                  daily for           



                                                  30 days.           

 

     lactobacill      2022- No        [id number] .5mg      Take 1          

 [facility]



     us        5-17 06-17                          tablet by           ity [facility]



     acidophilus      00:00: 04:59                          mouth 2           [facility]

[place]



               00   :00                           (two)           [facility]



                                                  times           [facility]



                                                  daily for           



                                                  30 days.           

 

     sennosides      2022- No        [id number] 8.6mg      Take 1          

 [facility]



     8.6 mg      5-17 06-17                          tablet by           ity of



     tablet      00:00: 04:59                          mouth 2           [place]



               00   :00                           (two)           [facility]



                                                  times           [facility]



                                                  daily for           



                                                  30 days.           

 

     tamsulosin      2022- No        [id number] .4mg      Take 1           

[facility]



     0.4 mg 24      5-17 06-17                          capsule by           ity

 of



     hr capsule      00:00: 04:59                          mouth           [place]



               00   :00                           daily for           [facility]



                                                  30 days.           [facility]

 

     docusate      2022- No        [id number] 100mg      Take 1           [facility]

[facility]



     100 mg      5-17 06-17                          capsule by           ity of



     capsule      00:00: 04:59                          mouth 2           [place]



               00   :00                           (two)           [facility]



                                                  times           [facility]



                                                  daily for           



                                                  30 days.           

 

     lactobacill      2022- No        [id number] .5mg      Take 1          

 [facility]



     us        5-17 06-17                          tablet by           [facility]



     acidophilus      00:00: 04:59                          mouth 2           [place]

[place]



               00   :00                           (two)           [facility]



                                                  times           [facility]



                                                  daily for           



                                                  30 days.           

 

     sennosides      2022- No        [id number] 8.6mg      Take 1          

 [facility]



     8.6 mg      5-17 06-17                          tablet by           ity of



     tablet      00:00: 04:59                          mouth 2           [place]



               00   :00                           (two)           [facility]



                                                  times           [facility]



                                                  daily for           



                                                  30 days.           

 

     tamsulosin      2022- No        [id number] .4mg      Take 1           

[facility]



     0.4 mg 24       06-17                          capsule by           ity

 of



     hr capsule      00:00: 04:59                          mouth           [place]



               00   :00                           daily for           Medical



                                                  30 days.           [facility]

 

     metroNIDAZO      2022- No        [id number] 500mg      Take 1         

  [facility]



      mg      5-17 05-21                          tablet by           ity 

of



     tablet      00:00: 04:59                          mouth           [place]



               00   :00                           every 12           [facility]



                                                  (twelve)           [facility]



                                                  hours for           



                                                  3 days.           

 

     metroNIDAZO      2022- No        [id number] 500mg      Take 1         

  [facility]



      mg      5-17 05-21                          tablet by           ity 

of



     tablet      00:00: 04:59                          mouth           [place]



               00   :00                           every 12           [facility]



                                                  (twelve)           [facility]



                                                  hours for           



                                                  3 days.           

 

     metroNIDAZO      2022- No             500mg      500 mg,           U

nivers



     LE (FLAGYL)      5-15 05-20                          Oral, Q12H           i

ty of



     tablet 500      22:00: 21:59                          ABX, 10           [facility]

[facility]



     mg        00   :00                           doses,           Medical



                                                  First dose           [facility]



                                                  on Sun           



                                                  5/15/22 at           



                                                  1700, Last           



                                                  dose on           



                                                  22 at           



                                                  0500,           



                                                  Routine<br           



                                                  >Reason           



                                                  for            



                                                  Anti-Infec           



                                                  tive:           



                                                  Documented           



                                                  Infection<           



                                                  br>Documen           



                                                  huma            



                                                  Infection           



                                                  Site: Skin           



                                                  / Soft           



                                                  Tissue<br>           



                                                  Duration           



                                                  of             



                                                  Therapy:           



                                                  Other (see           



                                                  Comments)           

 

     cefTRIAXone       No             2g        2 g, IV           Un

[facility]



     (ROCEPHIN)      5-15 05-17                          Piggyback,           it

y of



     2 g in NaCl      20:00: 20:03                          Q24H ABX,           

[place]



     0.9% (NS)      00   :21                           3 doses,           Med[facility]

l



     100 mL                                         First dose           [facility]



     MINI-BAG                                         on Sun           



                                                  5/15/22 at           



                                                  1500, Last           



                                                  dose on           



                                                  22 at           



                                                  1500,           



                                                  Administer           



                                                  over 30           



                                                  Minutes,           



                                                  100            



                                                  mL<br>Reas           



                                                  on for           



                                                  Anti-Infec           



                                                  tive:           



                                                  Documented           



                                                  Infection<           



                                                  br>Documen           



                                                  huma            



                                                  Infection           



                                                  Site: Skin           



                                                  / Soft           



                                                  Tissue<br>           



                                                  Duration           



                                                  of             



                                                  Therapy:           



                                                  Other (see           



                                                  Comments)           

 

     enoxaparin            Yes            30mg      30 mg,           Unive

rs



     (LOVENOX)      5-12                               Subcutaneo           ity 

of



     injection      01:00:                               us, Q12H,           [facility]

[facility]



     30 mg      00                                 First dose           Medical



                                                  on Wed           [facility]



                                                  22 at           



                                                  2000,           



                                                  Until           



                                                  Discontinu           



                                                  ed,            



                                                  Routine           

 

     proMETHazin            Yes            25mg      25 mg, IV           U

nivers



     e                                        Piggyback,           ity of



     (PHENERGAN)      22:58:                               Q4HPRN,           [facility]

as



     25 mg in      13                                 Starting           Medical



     NaCl 0.9%                                         on Wed           [facility]



     (NS) 50 mL                                         22 at           



     IV                                           1758,           



     piggyback                                         Until           



                                                  Discontinu           



                                                  ed,            



                                                  Routine,           



                                                  Nausea and           



                                                  Vomiting           



                                                  (N/V)           

 

     collagenase            Yes                      Topical           Uni

vers



     (SANTYL)                                     (Apply To           ity of



     ointment      22:15:                               Affected           [place]



               00                                 Areas),           Medical



                                                  DAILY,           [facility]



                                                  First dose           



                                                  on 22 at           



                                                  1715,           



                                                  Until           



                                                  Discontinu           



                                                  ed,            



                                                  Routine           

 

     HYDROmorpho      2022- No             1mg       1 mg, Slow          

 [facility]



     ne                                  IV Push,           ity of



     (DILAUDID)      21:45: 21:25                          ONCE, 1           [facility]

as



     injection 1      00   :00                           dose, On           Medi

[facility]



     mg                                           22 at           



                                                  1645,           



                                                  Routine<br           



                                                  >Use           



                                                  approved           



                                                  by             



                                                  (Faculty):           



                                                  ADC            



                                                  PROVIDER           

 

     magnesium      2022- No             2g        2 g, IV           Un[facility]

ers



     sulfate in                                Piggyback,           it

y of



     water 2      15:15: 16:11                          Administer           [facility]

as



     gram/50 mL      00   :00                           over 60           Med[facility]

l



     (4 %)                                         Minutes,           [facility]



     infusion 2                                         ONCE, 1           



     g                                            dose, On           



                                                  22 at           



                                                  1015,           



                                                  Routine           

 

     lactulose            Yes            15mL      15 mL,           Un[facility]

s



     (CEPHULAC)                                     Oral,           ity of



     solution 15      14:00:                               DAILY,           [place]

[facility]



     mL        00                                 First dose           [facility]



                                                  on Wed           [facility]



                                                  22 at           



                                                  0900,           



                                                  Until           



                                                  Discontinu           



                                                  ed,            



                                                  Routine           

 

     vancomycin      2022- No             125mg      125 mg,           Un

[facility]



     (FIRVANQ)                                Oral,           ity of



     50 mg/mL      14:00: 16:27                          Q24H,           [place]



     oral      00   :02                           First dose           Medical



     solution                                         on 



     125 mg                                         22 at           



                                                  0900,           



                                                  Until           



                                                  Discontinu           



                                                  ed,            



                                                  Routine<br           



                                                  >Reason           



                                                  for            



                                                  Anti-Infec           



                                                  tive:           



                                                  Empiric           



                                                  Non-Surgic           



                                                  al             



                                                  Prophylaxi           



                                                  s<br>Durat           



                                                  ion of           



                                                  therapy: 7           



                                                  days           

 

     sennosides      0      Yes            8.6mg      8.6 mg,           Uni

vers



     (SENOKOT)                                     Oral, BID,           ity 

of



     tablet 8.6      13:00:                               First dose           [facility]

[place]



     mg        00                                 on Wed           [facility]



                                                  22 at           [facility]



                                                  0800,           



                                                  Until           



                                                  Discontinu           



                                                  ed,            



                                                  Routine           

 

     docusate            Yes            100mg      100 mg,           Unive

rs



     (COLACE)                                     Oral, BID,           ity o

f



     capsule 100      13:00:                               First dose           

[place]



     mg        00                                 on Wed           Medical



                                                  22 at           [facility]



                                                  0800,           



                                                  Until           



                                                  Discontinu           



                                                  ed,            



                                                  Routine           

 

     HYDROmorpho            Yes            2mg       2 mg,           Unive

rs



     ne                                       Oral, TID,           ity of



     (DILAUDID)      13:00:                               First dose           [facility]

[facility]



     tablet 2 mg      00                                 on Wed           [facility]

l



                                                  22 at           [facility]



                                                  0800,           



                                                  Until           



                                                  Discontinu           



                                                  ed             

 

     lactobacill      0      Yes            .5mg      0.5 mg,           Uni

vers



     us                                       Oral, BID,           ity of



     acidophilus      13:00:                               First dose           

[place]



     tablet 0.5      00                                 on Wed           Medical



     mg                                           22 at           [facility]



                                                  0800,           



                                                  Until           



                                                  Discontinu           



                                                  ed,            



                                                  Routine           

 

     ceFEPIme      2022- No             2g        2 g, IV           Unive

rs



     (MAXIPIME)       05-15                          Piggyback,           it

y of



     2 g in NaCl      11:00: 19:23                          Q8H ABX,           [facility]

[facility]



     0.9% (NS)      00   :40                           First dose           Medi

[facility]



     100 mL                                         on Wed           [facility]



     MINI-BAG                                         22 at           



                                                  0600,           



                                                  Until           



                                                  Discontinu           



                                                  ed,            



                                                  Administer           



                                                  over 30           



                                                  Minutes,           



                                                  100            



                                                  mL<br>Reas           



                                                  on for           



                                                  Anti-Infec           



                                                  tive:           



                                                  Empiric           



                                                  Therapy           



                                                  for            



                                                  Suspected           



                                                  Infection<           



                                                  br&gt;Empi           



                                                  alda            



                                                  Therapy           



                                                  Site:           



                                                  Bone<br>Du           



                                                  ration of           



                                                  therapy:           



                                                  72 hours           

 

     NaCl 0.9%      2022- No             1000mL      at 50           Univ

ers



     (NS) IV       05-16                          mL/hr, IV           ity of



     infusion      10:00: 16:41                          Infusion,           [facility]

as



     1,000 mL      00   :28                           CONTINUOUS           Medic

al



                                                  , Starting           [facility]



                                                  on 22 at           



                                                  0500,           



                                                  Until 22 at           



                                                  1141,           



                                                  Routine           

 

     doxycycline      2022- No             100mg      100 mg, IV         

  [facility]



     (VIBRAMYCIN                                Piggyback,           i

ty of



     ) 100 mg in      09:15: 23:11                          Q12H ABX,           

[place]



     NaCl 0.9%      00   :48                           First dose           Medi

[facility]



     (NS) 100 mL                                         on 



     MINI-BAG                                         22 at           



                                                  0415,           



                                                  Until           



                                                  Discontinu           



                                                  ed,            



                                                  Administer           



                                                  over 60           



                                                  Minutes,           



                                                  100            



                                                  mL<br>Reas           



                                                  on for           



                                                  Anti-Infec           



                                                  tive:           



                                                  Empiric           



                                                  Therapy           



                                                  for            



                                                  Suspected           



                                                  Infection<           



                                                  br>Empiric           



                                                  Therapy           



                                                  Site:           



                                                  Wound<br>D           



                                                  uration of           



                                                  therapy: 7           



                                                  days           

 

     tamsulosin            Yes            .4mg      0.4 mg,           Univ

ers



     (FLOMAX)                                     Oral,           ity of



     capsule 0.4      08:30:                               DAILY,           [place]

[facility]



     mg        00                                 First dose           Medical



                                                  on 22 at           



                                                  0330,           



                                                  Until           



                                                  Discontinu           



                                                  ed,            



                                                  Routine           

 

     HYDROmorpho            Yes            1mg       1 mg, Slow           

[facility]



     ne                                       IV Push,           ity of



     (DILAUDID)      06:04:                               Q4HPRN,           [place]

[facility]



     injection 1      07                                 Starting           Medi

[facility]



     mg                                           on 22 at           



                                                  0104,           



                                                  Until           



                                                  Discontinu           



                                                  ed,            



                                                  Routine,           



                                                  Pain           



                                                  (scale           



                                                  7-10)<br>U           



                                                  se             



                                                  approved           



                                                  by             



                                                  (Faculty):           



                                                  ADC            



                                                  PROVIDER           

 

     FENTanyl PF      2022- No             50ug      50 mcg,           Un

[facility]



     (SUBLIMAZE                                Slow IV           ity o

f



     (PF))      05:30: 05:01                          Push,           [place]



     injection      00   :00                           ONCE, 1           Medical



     50 mcg                                         dose, On           [facility]



                                                  22 at           



                                                  0030, STAT           

 

     iopamidol      2022- No        [id number] 150mL      150 mL,         

  [facility]



     (ISOVUE                                Intravenou           ity o

f



     370-500 mL)      04:45: 03:34                          s, ONCE, 1          

 [place]



     injection      00   :00                           dose, On           [facility]

l



     150 mL                                         Tue            [facility]



                                                  5/10/22 at           



                                                  2345,           



                                                  Routine           

 

     ceFEPIme      2022- No             2000mg      2,000 mg,           U

[facility]



     (MAXIPIME)                                IV             ity of



     injection      04:15: 04:15                          Piggyback,           T

[place]



     2,000 mg      00   :00                           ONCE, 1           Medical



                                                  dose, On           Branch



                                                  Tue            



                                                  5/10/22 at           



                                                  2315,           



                                                  STAT<br>Re           



                                                  ason for           



                                                  Anti-Infec           



                                                  tive:           



                                                  Empiric           



                                                  Therapy           



                                                  for            



                                                  Suspected           



                                                  Infection<           



                                                  br>Empiric           



                                                  Therapy           



                                                  Site:           



                                                  Bone<br>Du           



                                                  ration of           



                                                  therapy:           



                                                  72 hours           

 

     FENTanyl PF      2022- No             75ug      75 mcg,           Un

[facility]



     (SUBLIMAZE                                Slow IV           ity o

f



     (PF))      03:45: 02:47                          Push,           [place]



     injection      00   :00                           ONCE, 1           Medical



     75 mcg                                         dose, On           [facility]            



                                                  5/10/22 at           



                                                  2245, STAT           

 

     OXYCODONE      2022- No                       Take by           [facility]

ers



     HCL/ACETAMI                                mouth.           ity o

f



     NOPHEN      03:22: 00:00                                         [place]



     (PERCOCET      50   :00                                          Medical



     ORAL)                                                        [place]

 

     FENTanyl PF      2022- No             100ug      100 mcg,           

[facility]



     (SUBLIMAZE                                Slow IV           ity o

f



     (PF))      01:30: 01:12                          Push,           [place]



     injection      00   :00                           ONCE, 1           Medical



     100 mcg                                         dose, On           [facility]            



                                                  5/10/22 at           



                                                  2030, STAT           

 

     Lovenox            No                       Notes:           [facility]



               4-13                               (Same as:           l



               17:00:                               Lovenox)                                                           

 

     Lovenox            No                       Notes:           [facility]



               4-13                               (Same as:           l



               17:00:                               Lovenox)                                                           

 

     Lovenox            No                       Notes:           [facility]



               4-13                               (Same as:           l



               17:00:                               Lovenox)                                                           

 

     Lovenox            No                       Notes:           [facility]



               4-13                               (Same as:           l



               17:00:                               Lovenox)                                                           

 

     promethazin            No                       Notes:           [facility]

rajeev



     e         4-13                               (Same as:           l



               16:47:                               Phenergan)                                                           

 

     promethazin            No                       Notes:           [facility]

rajeev



     e         4-13                               (Same as:           l



               16:47:                               Phenergan)                                                           

 

     promethazin            No                       Notes:           [facility]

rajeev



     e         4-13                               (Same as:           l



               16:47:                               Phenergan)                                                           

 

     promethazin            No                       Notes:           [facility]

rajeev



     e         4-13                               (Same as:           l



               16:47:                               Phenergan)                                                           

 

     albuterol            No                       Notes: SEE           Me

moria



     0.083%      4-13                               RT             l



     inhalation      16:46:                               DOCUMENTAT           H

[facility]



     solution      00                                 ION (Same           



                                                  as:            



                                                  Proventil)           

 

     albuterol            No                       Notes: SEE           Me

moria



     0.083%      4-13                               RT             l



     inhalation      16:46:                               DOCUMENTAT           H

[facility]



     solution      00                                 ION (Same           



                                                  as:            



                                                  Proventil)           

 

     albuterol            No                       Notes: SEE           Me

moria



     0.083%      4-13                               RT             l



     inhalation      16:46:                               DOCUMENTAT           H

[facility]



     solution      00                                 ION (Same           



                                                  as:            



                                                  Proventil)           

 

     albuterol            No                       Notes: SEE           Me

moria



     0.083%      4-13                               RT             l



     inhalation      16:46:                               DOCUMENTAT           H

ermann



     solution      00                                 ION (Same           



                                                  as:            



                                                  Proventil)           

 

     hydromorpho            Yes                      4 mg = 1           Me

moria



     ne 4 mg      4-13                               tab, PO,           l



     oral tablet      16:43:                               Q12H, 0           Her

child



               00                                 Refill(s)           

 

     hydromorpho            Yes                      4 mg = 1           Me

moria



     ne 4 mg      4-13                               tab, PO,           l



     oral tablet      16:43:                               Q12H, 0           Her

child



               00                                 Refill(s)           

 

     hydromorpho      0      Yes                      4 mg = 1           Me

moria



     ne 4 mg      4-13                               tab, PO,           l



     oral tablet      16:43:                               Q12H, 0           Her

child



               00                                 Refill(s)           

 

     hydromorpho      0      Yes                      4 mg = 1           Me

moria



     ne 4 mg      4-13                               tab, PO,           l



     oral tablet      16:43:                               Q12H, 0           Her

child



               00                                 Refill(s)           

 

     Lantus 100      0      No                       10 unit,           Mem

oria



     units/mL      4-13                               SUB-Q,           l



               16:40:                               Daily, 0           [facility]



               00                                 Refill(s)           

 

     Lantus 100      0      No                       10 unit,           Mem

[facility]



     units/mL      4-13                               SUB-Q,           l



               16:40:                               Daily, 0           [facility]



               00                                 Refill(s)           

 

     Lantus 100      0      No                       10 unit,           Mem

[facility]



     units/mL      4-13                               SUB-Q,           l



               16:40:                               Daily, 0           [place]



               00                                 Refill(s)           

 

     Lantus 100      0      No                       10 unit,           Mem

[facility]



     units/mL      4-13                               SUB-Q,           l



               16:40:                               Daily, 0                                            Refill(s)           

 

     Lantus 100      -0      No                       10 unit,           Mem

[facility]



     units/mL      4-12                               0.1 mL,           l



               14:00:                               Route:                                            SUB-Q,           



                                                  Drug form:           



                                                  SOLN,           



                                                  Daily,           



                                                  Dosing           



                                                  Weight           



                                                  127.727,           



                                                  kg, Start           



                                                  date:           



                                                  22           



                                                  9:00:00           



                                                  CDT,           



                                                  Duration:           



                                                  30 day,           



                                                  Stop date:           



                                                  22           



                                                  9:00:00           



                                                  CDT,           



                                                  Infuse           



                                                  over: 0           



                                                  hr, 0           

 

     Lantus 100      -0      No                       10 unit,           Mem

[facility]



     units/mL      4-12                               0.1 mL,           l



               14:00:                               Route:                                            SUB-Q,           



                                                  Drug form:           



                                                  SOLN,           



                                                  Daily,           



                                                  Dosing           



                                                  Weight           



                                                  127.727,           



                                                  kg, Start           



                                                  date:           



                                                  22           



                                                  9:00:00           



                                                  CDT,           



                                                  Duration:           



                                                  30 day,           



                                                  Stop date:           



                                                  22           



                                                  9:00:00           



                                                  CDT,           



                                                  Infuse           



                                                  over: 0           



                                                  hr, 0           

 

     Lantus 100      -0      No                       10 unit,           Mem

[facility]



     units/mL      4-12                               0.1 mL,           l



               14:00:                               Route:                                            SUB-Q,           



                                                  Drug form:           



                                                  SOLN,           



                                                  Daily,           



                                                  Dosing           



                                                  Weight           



                                                  127.727,           



                                                  kg, Start           



                                                  date:           



                                                  22           



                                                  9:00:00           



                                                  CDT,           



                                                  Duration:           



                                                  30 day,           



                                                  Stop date:           



                                                  22           



                                                  9:00:00           



                                                  CDT,           



                                                  Infuse           



                                                  over: 0           



                                                  hr, 0           

 

     Lantus 100      -0      No                       10 unit,           Mem

[facility]



     units/mL      4-12                               0.1 mL,           l



               14:00:                               Route:                                            SUB-Q,           



                                                  Drug form:           



                                                  SOLN,           



                                                  Daily,           



                                                  Dosing           



                                                  Weight           



                                                  127.727,           



                                                  kg, Start           



                                                  date:           



                                                  22           



                                                  9:00:00           



                                                  CDT,           



                                                  Duration:           



                                                  30 day,           



                                                  Stop date:           



                                                  22           



                                                  9:00:00           



                                                  CDT,           



                                                  Infuse           



                                                  over: 0           



                                                  hr, 0           

 

     cefepime +            No                       Notes:           [facility]

ia



     sterile      4-12                               (Same As:           l



     water 10 mL      06:00:                               Maxipime)           H

                                 ***            



                                                  MEDICATION           



                                                  WASTE ***           



                                                  Product           



                                                  Size: 1000           



                                                  mg Product           



                                                  Wasted:           



                                                  _0__ mg           

 

     cefepime +            No                       Notes:           [facility]

ia



     sterile      4-12                               (Same As:           l



     water 10 mL      06:00:                               Maxipime)           H

[facility]



               00                                 ***            



                                                  MEDICATION           



                                                  WASTE ***           



                                                  Product           



                                                  Size: 1000           



                                                  mg Product           



                                                  Wasted:           



                                                  _0__ mg           

 

     cefepime +            No                       Notes:           [facility]

ia



     sterile      4-12                               (Same As:           l



     water 10 mL      06:00:                               Maxipime)           H

[facility]



               00                                 ***            



                                                  MEDICATION           



                                                  WASTE ***           



                                                  Product           



                                                  Size: 1000           



                                                  mg Product           



                                                  Wasted:           



                                                  _0__ mg           

 

     cefepime +            No                       Notes:           [facility]

ia



     sterile      4-12                               (Same As:           l



     water 10 mL      06:00:                               Maxipime)           H

[facility]



               00                                 ***            



                                                  MEDICATION           



                                                  WASTE ***           



                                                  Product           



                                                  Size: 1000           



                                                  mg Product           



                                                  Wasted:           



                                                  _0__ mg           

 

     cefepime +            No                       Notes:           [facility]

ia



     sterile      4-12                               (Same As:           l



     water 10 mL      05:00:                               Maxipime)           H

[facility]



               00                                 ***            



                                                  MEDICATION           



                                                  WASTE ***           



                                                  Product           



                                                  Size: 1000           



                                                  mg Product           



                                                  Wasted:           



                                                  _0__ mg           

 

     insulin            No                       Notes:           [facility]



     lispro      4-12                               (Same as:           l



               05:00:                               Humalog)           [place]



               00                                 Roll in           



                                                  palms of           



                                                  hands           



                                                  gently; Do           



                                                  not shake           



                                                  vigorously           



                                                  . WASTE:           



                                                  F/P -           



                                                  Black; E -           



                                                  Municipal           



                                                  Trash Bin           



                                                  Stable for           



                                                  28 days at           



                                                  room           



                                                  temperatur           



                                                  e. Expires           



                                                  in _____           



                                                  days from           



                                                  __________           



                                                  ____Date           

 

     cefepime +            No                       Notes:           [facility]

ia



     sterile      4-12                               (Same As:           l



     water 10 mL      05:00:                               Maxipime)           [facility]

[facility]



               00                                 ***            



                                                  MEDICATION           



                                                  WASTE ***           



                                                  Product           



                                                  Size: 1000           



                                                  mg Product           



                                                  Wasted:           



                                                  _0__ mg           

 

     insulin      -0      No                       Notes:           [facility]



     lispro      4-12                               (Same as:           l



               05:00:                               Humalog)           [facility]



               00                                 Roll in           



                                                  palms of           



                                                  hands           



                                                  gently; Do           



                                                  not shake           



                                                  vigorously           



                                                  . WASTE:           



                                                  F/P -           



                                                  Black; E -           



                                                  Municipal           



                                                  Trash Bin           



                                                  Stable for           



                                                  28 days at           



                                                  room           



                                                  temperatur           



                                                  e. Expires           



                                                  in _____           



                                                  days from           



                                                  __________           



                                                  ____Date           

 

     cefepime +            No                       Notes:           [facility]

[facility]



     sterile      4-12                               (Same As:           l



     water 10 mL      05:00:                               Maxipime)           [facility]

[facility]



               00                                 ***            



                                                  MEDICATION           



                                                  WASTE ***           



                                                  Product           



                                                  Size: 1000           



                                                  mg Product           



                                                  Wasted:           



                                                  _0__ mg           

 

     insulin            No                       Notes:           [facility]



     lispro      4-12                               (Same as:           l



               05:00:                               Humalog)           [facility]



               00                                 Roll in           



                                                  palms of           



                                                  hands           



                                                  gently; Do           



                                                  not shake           



                                                  vigorously           



                                                  . WASTE:           



                                                  F/P -           



                                                  Black; E -           



                                                  Municipal           



                                                  Trash Bin           



                                                  Stable for           



                                                  28 days at           



                                                  room           



                                                  temperatur           



                                                  e. Expires           



                                                  in _____           



                                                  days from           



                                                  __________           



                                                  ____Date           

 

     cefepime +            No                       Notes:           [facility]

[facility]



     sterile      4-12                               (Same As:           l



     water 10 mL      05:00:                               Maxipime)           [facility]

[facility]



               00                                 ***            



                                                  MEDICATION           



                                                  WASTE ***           



                                                  Product           



                                                  Size: 1000           



                                                  mg Product           



                                                  Wasted:           



                                                  _0__ mg           

 

     insulin            No                       Notes:           [facility]



     lispro      4-12                               (Same as:           l



               05:00:                               Humalog)           [facility]



               00                                 Roll in           



                                                  palms of           



                                                  hands           



                                                  gently; Do           



                                                  not shake           



                                                  vigorously           



                                                  . WASTE:           



                                                  F/P -           



                                                  Black; E -           



                                                  Municipal           



                                                  Trash Bin           



                                                  Stable for           



                                                  28 days at           



                                                  room           



                                                  temperatur           



                                                  e. Expires           



                                                  in _____           



                                                  days from           



                                                  __________           



                                                  ____Date           

 

     Dilaudid            No                       Notes:           [facility]



               4-11                               (Same as:           l



               16:53:                               Dilaudid)                                                           

 

     Dilaudid            No                       Notes:           [facility]



               4-11                               (Same as:           l



               16:53:                               Dilaudid)           [facility]



                                                               

 

     Dilaudid            No                       Notes:           [facility]



               4-11                               (Same as:           l



               16:53:                               Dilaudid)           [facility]



                                                               

 

     Dilaudid            No                       Notes:           [facility]



               4-11                               (Same as:           l



               16:53:                               Dilaudid)                                                           

 

     Lantus 100            No                       10 unit,           [facility]

[facility]



     units/mL      -11                               0.1 mL,           l



               16:24:                               Route:           [facility]



               00                                 SUB-Q,           



                                                  Drug form:           



                                                  SOLN,           



                                                  ONCE,           



                                                  Dosing           



                                                  Weight           



                                                  127.727,           



                                                  kg, Start           



                                                  date:           



                                                  22           



                                                  11:24:00           



                                                  CDT, Stop           



                                                  date:           



                                                  22           



                                                  11:24:00           



                                                  CDT,           



                                                  Infuse           



                                                  over: 0           



                                                  hr, 0           

 

     Lantus 100      -0      No                       10 unit,           [facility]

[facility]



     units/mL      4-11                               0.1 mL,           l



               16:24:                               Route:           [facility]



               00                                 SUB-Q,           



                                                  Drug form:           



                                                  SOLN,           



                                                  ONCE,           



                                                  Dosing           



                                                  Weight           



                                                  127.727,           



                                                  kg, Start           



                                                  date:           



                                                  22           



                                                  11:24:00           



                                                  CDT, Stop           



                                                  date:           



                                                  22           



                                                  11:24:00           



                                                  CDT,           



                                                  Infuse           



                                                  over: 0           



                                                  hr, 0           

 

     Lantus 100      -0      No                       10 unit,           [facility]

[facility]



     units/mL      4-11                               0.1 mL,           l



               16:24:                               Route:           [facility]



               00                                 SUB-Q,           



                                                  Drug form:           



                                                  SOLN,           



                                                  ONCE,           



                                                  Dosing           



                                                  Weight           



                                                  127.727,           



                                                  kg, Start           



                                                  date:           



                                                  22           



                                                  11:24:00           



                                                  CDT, Stop           



                                                  date:           



                                                  22           



                                                  11:24:00           



                                                  CDT,           



                                                  Infuse           



                                                  over: 0           



                                                  hr, 0           

 

     Lantus 100      -0      No                       10 unit,           [facility]

[facility]



     units/mL      4-11                               0.1 mL,           l



               16:24:                               Route:           [facility]



               00                                 SUB-Q,           



                                                  Drug form:           



                                                  SOLN,           



                                                  ONCE,           



                                                  Dosing           



                                                  Weight           



                                                  127.727,           



                                                  kg, Start           



                                                  date:           



                                                  22           



                                                  11:24:00           



                                                  CDT, Stop           



                                                  date:           



                                                  22           



                                                  11:24:00           



                                                  CDT,           



                                                  Infuse           



                                                  over: 0           



                                                  hr, 0           

 

     NS (Bolus)      0      No                       500 mL,           [facility]

[facility]



     IV        4-11                               500 ml/hr,           l



               15:15:                               Infuse           [facility]



               00                                 Over: 1           



                                                  hr, Route:           



                                                  IV, 500,           



                                                  Drug form:           



                                                  INJ, ONCE,           



                                                  Priority:           



                                                  STAT,           



                                                  Dosing           



                                                  Weight           



                                                  127.727           



                                                  kg, Start           



                                                  date:           



                                                  22           



                                                  10:15:00           



                                                  CDT, Stop           



                                                  date:           



                                                  22           



                                                  10:15:00           



                                                  CDT, 0           

 

     NS (Bolus)      0      No                       500 mL,           [facility]

[facility]



     IV        4-11                               500 ml/hr,           l



               15:15:                               Infuse           [facility]



               00                                 Over: 1           



                                                  hr, Route:           



                                                  IV, 500,           



                                                  Drug form:           



                                                  INJ, ONCE,           



                                                  Priority:           



                                                  STAT,           



                                                  Dosing           



                                                  Weight           



                                                  127.727           



                                                  kg, Start           



                                                  date:           



                                                  22           



                                                  10:15:00           



                                                  CDT, Stop           



                                                  date:           



                                                  22           



                                                  10:15:00           



                                                  CDT, 0           

 

     NS (Bolus)      -0      No                       500 mL,           [facility]

[facility]



     IV        4-11                               500 ml/hr,           l



               15:15:                               Infuse                                            Over: 1           



                                                  hr, Route:           



                                                  IV, 500,           



                                                  Drug form:           



                                                  INJ, ONCE,           



                                                  Priority:           



                                                  STAT,           



                                                  Dosing           



                                                  Weight           



                                                  127.727           



                                                  kg, Start           



                                                  date:           



                                                  22           



                                                  10:15:00           



                                                  CDT, Stop           



                                                  date:           



                                                  22           



                                                  10:15:00           



                                                  CDT, 0           

 

     NS (Bolus)      -0      No                       500 mL,           [facility]

[facility]



     IV        4-11                               500 ml/hr,           l



               15:15:                               Infuse           [facility]



               00                                 Over: 1           



                                                  hr, Route:           



                                                  IV, 500,           



                                                  Drug form:           



                                                  INJ, ONCE,           



                                                  Priority:           



                                                  STAT,           



                                                  Dosing           



                                                  Weight           



                                                  127.727           



                                                  kg, Start           



                                                  date:           



                                                  22           



                                                  10:15:00           



                                                  CDT, Stop           



                                                  date:           



                                                  22           



                                                  10:15:00           



                                                  CDT, 0           

 

     Dextrose      -0      No                       12.5 gm,           [facility]

[facility]



     50% Syringe      4-11                               25 mL,           l



     (D50W)      13:59:                               Route:                                            IVP, Drug           



                                                  Form: INJ,           



                                                  Dosing           



                                                  Weight           



                                                  127.727,           



                                                  kg, PRN,           



                                                  PRN Blood           



                                                  Glucose           



                                                  Results,           



                                                  Start           



                                                  date:           



                                                  22           



                                                  8:59:00           



                                                  CDT,           



                                                  Duration:           



                                                  30 day,           



                                                  Stop date:           



                                                  22           



                                                  8:58:00           



                                                  CDT, 0           

 

     glucagon      -0      No                       1 mg,           [facility]



               4-11                               Route: IM,           l



               13:59:                               Drug form:                                            PDR/INJ,           



                                                  PRN,           



                                                  Dosing           



                                                  Weight           



                                                  127.727,           



                                                  kg, PRN           



                                                  Blood           



                                                  Glucose           



                                                  Results,           



                                                  Start           



                                                  date:           



                                                  22           



                                                  8:59:00           



                                                  CDT,           



                                                  Duration:           



                                                  30 day,           



                                                  Stop date:           



                                                  22           



                                                  8:58:00           



                                                  CDT, 0           

 

     insulin      -0      No                       Notes:           [facility]



     lispro      4-11                               (Same as:           l



               13:59:                               Humalog)                                            Roll in           



                                                  palms of           



                                                  hands           



                                                  gently; Do           



                                                  not shake           



                                                  vigorously           



                                                  . WASTE:           



                                                  F/P -           



                                                  Black; E -           



                                                  Municipal           



                                                  Trash Bin           



                                                  Stable for           



                                                  28 days at           



                                                  room           



                                                  temperatur           



                                                  e. Expires           



                                                  in _____           



                                                  days from           



                                                  __________           



                                                  ____Date           

 

     Dextrose      -0      No                       12.5 gm,           [facility]

[facility]



     50% Syringe      4-11                               25 mL,           l



     (D50W)      13:59:                               Route:           [facility]



               00                                 IVP, Drug           



                                                  Form: INJ,           



                                                  Dosing           



                                                  Weight           



                                                  127.727,           



                                                  kg, PRN,           



                                                  PRN Blood           



                                                  Glucose           



                                                  Results,           



                                                  Start           



                                                  date:           



                                                  22           



                                                  8:59:00           



                                                  CDT,           



                                                  Duration:           



                                                  30 day,           



                                                  Stop date:           



                                                  22           



                                                  8:58:00           



                                                  CDT, 0           

 

     glucagon      2022-0      No                       1 mg,           [facility]



               4-11                               Route: IM,           l



               13:59:                               Drug form:           [facility]



               00                                 PDR/INJ,           



                                                  PRN,           



                                                  Dosing           



                                                  Weight           



                                                  127.727,           



                                                  kg, PRN           



                                                  Blood           



                                                  Glucose           



                                                  Results,           



                                                  Start           



                                                  date:           



                                                  22           



                                                  8:59:00           



                                                  CDT,           



                                                  Duration:           



                                                  30 day,           



                                                  Stop date:           



                                                  22           



                                                  8:58:00           



                                                  CDT, 0           

 

     insulin      202-0      No                       Notes:           [facility]



     lispro      4-11                               (Same as:           l



               13:59:                               Humalog)                                            Roll in           



                                                  palms of           



                                                  hands           



                                                  gently; Do           



                                                  not shake           



                                                  vigorously           



                                                  . WASTE:           



                                                  F/P -           



                                                  Black; E -           



                                                  dateIITians           



                                                  Trash Bin           



                                                  Stable for           



                                                  28 days at           



                                                  room           



                                                  temperatur           



                                                  e. Expires           



                                                  in _____           



                                                  days from           



                                                  __________           



                                                  ____Date           

 

     Dextrose      -0      No                       12.5 gm,           [facility]

[facility]



     50% Syringe      4-11                               25 mL,           l



     (D50W)      13:59:                               Route:           [facility]



               00                                 IVP, Drug           



                                                  Form: INJ,           



                                                  Dosing           



                                                  Weight           



                                                  127.727,           



                                                  kg, PRN,           



                                                  PRN Blood           



                                                  Glucose           



                                                  Results,           



                                                  Start           



                                                  date:           



                                                  22           



                                                  8:59:00           



                                                  CDT,           



                                                  Duration:           



                                                  30 day,           



                                                  Stop date:           



                                                  22           



                                                  8:58:00           



                                                  CDT, 0           

 

     glucagon      -0      No                       1 mg,           [facility]



               4-11                               Route: IM,           l



               13:59:                               Drug form:           [facility]



               00                                 PDR/INJ,           



                                                  PRN,           



                                                  Dosing           



                                                  Weight           



                                                  127.727,           



                                                  kg, PRN           



                                                  Blood           



                                                  Glucose           



                                                  Results,           



                                                  Start           



                                                  date:           



                                                  22           



                                                  8:59:00           



                                                  CDT,           



                                                  Duration:           



                                                  30 day,           



                                                  Stop date:           



                                                  22           



                                                  8:58:00           



                                                  CDT, 0           

 

     insulin      -0      No                       Notes:           [facility]



     lispro      4-11                               (Same as:           l



               13:59:                               Humalog)           [facility]                                 Roll in           



                                                  palms of           



                                                  hands           



                                                  gently; Do           



                                                  not shake           



                                                  vigorously           



                                                  . WASTE:           



                                                  F/P -           



                                                  Black; E -           



                                                  Municipal           



                                                  Trash Bin           



                                                  Stable for           



                                                  28 days at           



                                                  room           



                                                  temperatur           



                                                  e. Expires           



                                                  in _____           



                                                  days from           



                                                  __________           



                                                  ____Date           

 

     Dextrose            No                       12.5 gm,           [facility]

[facility]



     50% Syringe                                     25 mL,           l



     (D50W)      13:59:                               Route:                                            IVP, Drug           



                                                  Form: INJ,           



                                                  Dosing           



                                                  Weight           



                                                  127.727,           



                                                  kg, PRN,           



                                                  PRN Blood           



                                                  Glucose           



                                                  Results,           



                                                  Start           



                                                  date:           



                                                  22           



                                                  8:59:00           



                                                  CDT,           



                                                  Duration:           



                                                  30 day,           



                                                  Stop date:           



                                                  22           



                                                  8:58:00           



                                                  CDT, 0           

 

     glucagon            No                       1 mg,           [facility]



                                              Route: IM,           l



               13:59:                               Drug form:                                            PDR/INJ,           



                                                  PRN,           



                                                  Dosing           



                                                  Weight           



                                                  127.727,           



                                                  kg, PRN           



                                                  Blood           



                                                  Glucose           



                                                  Results,           



                                                  Start           



                                                  date:           



                                                  22           



                                                  8:59:00           



                                                  CDT,           



                                                  Duration:           



                                                  30 day,           



                                                  Stop date:           



                                                  22           



                                                  8:58:00           



                                                  CDT, 0           

 

     insulin            No                       Notes:           [facility]



     lispro      -11                               (Same as:           l



               13:59:                               Humalog)                                            Roll in           



                                                  palms of           



                                                  hands           



                                                  gently; Do           



                                                  not shake           



                                                  vigorously           



                                                  . WASTE:           



                                                  F/P -           



                                                  Black; E -           



                                                  Municipal           



                                                  Trash Bin           



                                                  Stable for           



                                                  28 days at           



                                                  room           



                                                  temperatur           



                                                  e. Expires           



                                                  in _____           



                                                  days from           



                                                  __________           



                                                  ____Date           

 

     Lyrica            No                       Notes:           [facility]



               4-11                               (Same as:           l



               02:00:                               [facility])                                                           

 

     Lyrica            No                       Notes:           [facility]



               4-11                               (Same as:           l



               02:00:                               [facility])                                                           

 

     Lyrica            No                       Notes:           [facility]



               4-11                               (Same as:           l



               02:00:                               [facility])                                                           

 

     Lyrica            No                       Notes:           [facility]



               4-11                               (Same as:           l



               02:00:                               [facility])                                                           

 

     valproic            No                       Notes:           [facility]



     acid 100      4-10                               Dilute in           l



     mg/mL      17:00:                               at least           [facility]



     intravenous      00                                 50ml D5W           



     solution +                                         or NS.           



     Sodium                                         Infusion           



     Chloride                                         rate = 20           



     0.9% IV 50                                         mg/min           



     mL                                           (Same As:           



                                                  [facility])           



                                                  Hazardous           



                                                  Drug Group           



                                                  3:Reproduc           



                                                  tive risk           



                                                  Hazardous           



                                                  Drug --           



                                                  Refer to           



                                                  safe           



                                                  handling           



                                                  procedure           



                                                  [facility]           

 

     valproic            No                       Notes:           [facility]



     acid 100      4-10                               Dilute in           l



     mg/mL      17:00:                               at least           [facility]



     intravenous      00                                 50ml D5W           



     solution +                                         or NS.           



     Sodium                                         Infusion           



     Chloride                                         rate = 20           



     0.9% IV 50                                         mg/min           



     mL                                           (Same As:           



                                                  [facility])           



                                                  Hazardous           



                                                  Drug Group           



                                                  3:Reproduc           



                                                  tive risk           



                                                  Hazardous           



                                                  Drug --           



                                                  Refer to           



                                                  safe           



                                                  handling           



                                                  procedure           



                                                  [facility]           

 

     valproic            No                       Notes:           [facility]



     acid 100      4-10                               Dilute in           l



     mg/mL      17:00:                               at least           [facility]



     intravenous      00                                 50ml D5W           



     solution +                                         or NS.           



     Sodium                                         Infusion           



     Chloride                                         rate = 20           



     0.9% IV 50                                         mg/min           



     mL                                           (Same As:           



                                                  [facility])           



                                                  Hazardous           



                                                  Drug Group           



                                                  3:Reproduc           



                                                  tive risk           



                                                  Hazardous           



                                                  Drug --           



                                                  Refer to           



                                                  safe           



                                                  handling           



                                                  procedure           



                                                  [facility]           

 

     valproic            No                       Notes:           [facility]



     acid 100      4-10                               Dilute in           l



     mg/mL      17:00:                               at least           [facility]



     intravenous      00                                 50ml D5W           



     solution +                                         or NS.           



     Sodium                                         Infusion           



     Chloride                                         rate = 20           



     0.9% IV 50                                         mg/min           



     mL                                           (Same As:           



                                                  [facility])           



                                                  Hazardous           



                                                  Drug Group           



                                                  3:Reproduc           



                                                  tive risk           



                                                  Hazardous           



                                                  Drug --           



                                                  Refer to           



                                                  safe           



                                                  handling           



                                                  procedure           



                                                  [facility]           

 

     Solu-MEDROL            No                       Notes:           [facility]

[facility]



               4-10                               (Same           l



               16:43:                               as:Solu[facility]ME           [facility]



               00                                 DROL,           



                                                  [facility]           



                                                  d) ***           



                                                  MEDICATION           



                                                  WASTE ***           



                                                  Product           



                                                  Size: 1000           



                                                  mg Product           



                                                  Wasted:           



                                                  _0__ mg           

 

     magnesium            No                       Notes:           [facility]

a



     sulfate      4-10                               WASTE: F/P           l



               16:43:                               - Sink; E           [facility]



                                                -              



                                                  [facility]           



                                                  Trash Bin           

 

     Solu-MEDROL            No                       Notes:           [facility]

rajeev



               4-10                               (Same           l



               16:43:                               as:Solu-ME           [place]



               00                                 DROL,           



                                                  [facility]           



                                                  d) ***           



                                                  MEDICATION           



                                                  WASTE ***           



                                                  Product           



                                                  Size: 1000           



                                                  mg Product           



                                                  Wasted:           



                                                  _0__ mg           

 

     magnesium            No                       Notes:           [facility]

a



     sulfate      4-10                               WASTE: F/P           l



               16:43:                               - Sink; E           [place]



               00                                 -              



                                                  Municipal           



                                                  Trash Bin           

 

     Solu-MEDROL            No                       Notes:           [facility]

rajeev



               4-10                               (Same           l



               16:43:                               as:Solu-ME           [place]



               00                                 DROL,           



                                                  [facility]           



                                                  d) ***           



                                                  MEDICATION           



                                                  WASTE ***           



                                                  Product           



                                                  Size: 1000           



                                                  mg Product           



                                                  Wasted:           



                                                  _0__ mg           

 

     magnesium            No                       Notes:           [facility]

a



     sulfate      4-10                               WASTE: F/P           l



               16:43:                               - Sink; E                                            -              



                                                  [facility]           



                                                  Trash Bin           

 

     Solu-MEDROL            No                       Notes:           [facility]

rajeev



               4-10                               (Same           l



               16:43:                               as:Solu-ME           [place]



               00                                 DROL,           



                                                  [facility]           



                                                  d) ***           



                                                  MEDICATION           



                                                  WASTE ***           



                                                  Product           



                                                  Size: 1000           



                                                  mg Product           



                                                  Wasted:           



                                                  _0__ mg           

 

     magnesium            No                       Notes:           [facility]

a



     sulfate      4-10                               WASTE: F/P           l



               16:43:                               - Sink; E                                            -              



                                                  [facility]           



                                                  Trash Bin           

 

     amitriptyli            No                       Notes:           [facility]

rajeev



     ne        4-10                               (Same as:           l



               02:00:                               Elavil)                                                           

 

     amitriptyli            No                       Notes:           [facility]

rajeev



     ne        4-10                               (Same as:           l



               02:00:                               Elavil)                                                           

 

     amitriptyli            No                       Notes:           [facility]

rajeev



     ne        4-10                               (Same as:           l



               02:00:                               Elavil)                                                           

 

     amitriptyli            No                       Notes:           [facility]

rajeev



     ne        4-10                               (Same as:           l



               02:00:                               Elavil)                                                           

 

     SUMAtriptan            No                       Notes:           [facility]

rajeev



               4-09                               (Same As:           l



               18:45:                               Imitrex)                                                           

 

     SUMAtriptan            No                       Notes:           [facility]

rajeev



               4-09                               (Same As:           l



               18:45:                               Imitrex)                                                           

 

     SUMAtriptan            No                       Notes:           [facility]

rajeev



               4-09                               (Same As:           l



               18:45:                               Imitrex)                                                           

 

     SUMAtriptan            No                       Notes:           [facility]

rajeev



               4-09                               (Same As:           l



               18:45:                               Imitrex)                                                           

 

     promethazin            No                       6.25 mg,           Me

moria



     e         4-09                               Route:           l



               18:44:                               IVPB,           [facility]



               00                                 Q12H,           



                                                  Dosing           



                                                  Weight           



                                                  127.727,           



                                                  kg, PRN           



                                                  Nausea &           



                                                  Vomiting,           



                                                  Start           



                                                  date:           



                                                  22           



                                                  13:44:00           



                                                  CDT,           



                                                  Duration:           



                                                  30 day,           



                                                  Stop date:           



                                                  22           



                                                  13:43:00           



                                                  CDT            

 

     promethazin      2022-0      No                       6.25 mg,           Me

[facility]ia



     e         -                               Route:           l



               18:44:                               IVPB,           [place]



               00                                 Q12H,           



                                                  Dosing           



                                                  Weight           



                                                  127.727,           



                                                  kg, PRN           



                                                  Nausea &           



                                                  Vomiting,           



                                                  Start           



                                                  date:           



                                                  22           



                                                  13:44:00           



                                                  CDT,           



                                                  Duration:           



                                                  30 day,           



                                                  Stop date:           



                                                  22           



                                                  13:43:00           



                                                  CDT            

 

     promethazin      2022-0      No                       6.25 mg,           Me

[facility]ia



     e         -                               Route:           l



               18:44:                               IVPB,           [place]



               00                                 Q12H,           



                                                  Dosing           



                                                  Weight           



                                                  127.727,           



                                                  kg, PRN           



                                                  Nausea &           



                                                  Vomiting,           



                                                  Start           



                                                  date:           



                                                  22           



                                                  13:44:00           



                                                  CDT,           



                                                  Duration:           



                                                  30 day,           



                                                  Stop date:           



                                                  22           



                                                  13:43:00           



                                                  CDT            

 

     promethazin      2022-0      No                       6.25 mg,           Me

[facility]ia



     e         -                               Route:           l



               18:44:                               IVPB,           [facility]



               00                                 Q12H,           



                                                  Dosing           



                                                  Weight           



                                                  127.727,           



                                                  kg, PRN           



                                                  Nausea &           



                                                  Vomiting,           



                                                  Start           



                                                  date:           



                                                  22           



                                                  13:44:00           



                                                  CDT,           



                                                  Duration:           



                                                  30 day,           



                                                  Stop date:           



                                                  22           



                                                  13:43:00           



                                                  CDT            

 

     Benadryl            No                       Notes:           [facility]



               4-09                               (Same as:           l



               18:37:                               Benadryl)                                                           

 

     Benadryl            No                       Notes:           [facility]



               4-09                               (Same as:           l



               18:37:                               Benadryl)                                                           

 

     Benadryl            No                       Notes:           [facility]



               4-09                               (Same as:           l



               18:37:                               Benadryl)                                                           

 

     Benadryl            No                       Notes:           [facility]



               4-09                               (Same as:           l



               18:37:                               Benadryl)                                                           

 

     Dilaudid            No                       Notes:           [facility]



               4-09                               (Same as:           l



               18:36:                               Dilaudid)                                                           

 

     Dilaudid            No                       Notes:           [facility]



               4-09                               (Same as:           l



               18:36:                               Dilaudid)           [place]



                                                               

 

     Dilaudid            No                       Notes:           [facility]



               4-09                               (Same as:           l



               18:36:                               Dilaudid)           [place]



                                                               

 

     Dilaudid            No                       Notes:           [facility]



               4-09                               (Same as:           l



               18:36:                               Dilaudid)           [place]                                                

 

     Phenergan            No                       Notes:           [facility]

a



               4-09                               (Same as:           l



               18:33:                               Phenergan)           [facility]



               00                                 6.25 mg =           



                                                  1/2 x 12.5           



                                                  mg TAB           

 

     Phenergan            No                       Notes:           [facility]

a



               4-09                               (Same as:           l



               18:33:                               Phenergan)           [place]



               00                                 6.25 mg =           



                                                  1/2 x 12.5           



                                                  mg TAB           

 

     Phenergan            No                       Notes:           [facility]

a



               4-09                               (Same as:           l



               18:33:                               Phenergan)           [facility]



               00                                 6.25 mg =           



                                                  1/2 x 12.5           



                                                  mg TAB           

 

     Phenergan            No                       Notes:           [facility]

a



               4-09                               (Same as:           l



               18:33:                               Phenergan)           [place]



               00                                 6.25 mg =           



                                                  1/2 x 12.5           



                                                  mg TAB           

 

     ascorbic            No                       Notes:           [facility]



     acid      -                               (Same as:           l



               14:00:                               Vitamin C)                                                           

 

     celecoxib            No                       Notes:           [facility]

a



               4-09                               NSAID.           l



               14:00:                               Please           [place]



               00                                 check           



                                                  indication           



                                                  . Not for           



                                                  seizure.           



                                                  (Same As:           



                                                  CeleBREX)           

 

     Lidoderm 5%            No                       Notes:           [facility]

rajeev



     topical                                     Apply only           l



     film      14:00:                               once for           [place]



     (patch)      00                                 up to 12           



                                                  hours in a           



                                                  24-hour           



                                                  period (12           



                                                  hours on           



                                                  and 12           



                                                  hours           



                                                  off).           



                                                  (Same as:           



                                                  Aspercreme           



                                                  Lidocaine           



                                                  Patch)           



                                                  "Remove           



                                                  old patch           



                                                  before           



                                                  applicatio           



                                                  n of new           



                                                  patch"           

 

     zinc            No                       Notes:           [facility]



     sulfate                                     (Zinc           l



               14:00:                               sulfate           [facility]



               00                                 capsule) -           



                                                  220 mg           



                                                  Zinc           



                                                  sulfate =           



                                                  50 mg           



                                                  elemental           



                                                  zinc Same           



                                                  as Zinc           



                                                  Sulfate           

 

     ascorbic            No                       Notes:           [facility]



     acid      -                               (Same as:           l



               14:00:                               Vitamin C)                                                           

 

     celecoxib            No                       Notes:           [facility]

a



               4-09                               NSAID.           l



               14:00:                               Please           [facility]



               00                                 check           



                                                  indication           



                                                  . Not for           



                                                  seizure.           



                                                  (Same As:           



                                                  CeleBREX)           

 

     Lidoderm 5%            No                       Notes:           [facility]

rajeev



     topical      4-09                               Apply only           l



     film      14:00:                               once for           [place]



     (patch)      00                                 up to 12           



                                                  hours in a           



                                                  24-hour           



                                                  period (12           



                                                  hours on           



                                                  and 12           



                                                  hours           



                                                  off).           



                                                  (Same as:           



                                                  Aspercreme           



                                                  Lidocaine           



                                                  Patch)           



                                                  "Remove           



                                                  old patch           



                                                  before           



                                                  applicatio           



                                                  n of new           



                                                  patch"           

 

     zinc            No                       Notes:           [facility]



     sulfate      4-09                               (Zinc           l



               14:00:                               sulfate           [facility]



               00                                 capsule) -           



                                                  220 mg           



                                                  Zinc           



                                                  sulfate =           



                                                  50 mg           



                                                  elemental           



                                                  zinc Same           



                                                  as Zinc           



                                                  Sulfate           

 

     ascorbic            No                       Notes:           [facility]



     acid      4-09                               (Same as:           l



               14:00:                               Vitamin C)           [facility]



               00                                                

 

     celecoxib            No                       Notes:           [facility]

a



               4-09                               NSAID.           l



               14:00:                               Please           [facility]



               00                                 check           



                                                  indication           



                                                  . Not for           



                                                  seizure.           



                                                  (Same As:           



                                                  CeleBREX)           

 

     Lidoderm 5%            No                       Notes:           [facility]

[facility]



     topical      4-09                               Apply only           l



     film      14:00:                               once for           [place]



     (patch)      00                                 up to 12           



                                                  hours in a           



                                                  24-hour           



                                                  period (12           



                                                  hours on           



                                                  and 12           



                                                  hours           



                                                  off).           



                                                  (Same as:           



                                                  Aspercreme           



                                                  Lidocaine           



                                                  Patch)           



                                                  "Remove           



                                                  old patch           



                                                  before           



                                                  applicatio           



                                                  n of new           



                                                  patch"           

 

     zinc            No                       Notes:           [facility]



     sulfate      4-09                               (Zinc           l



               14:00:                               sulfate           [place]



               00                                 capsule) -           



                                                  220 mg           



                                                  Zinc           



                                                  sulfate =           



                                                  50 mg           



                                                  elemental           



                                                  zinc Same           



                                                  as Zinc           



                                                  Sulfate           

 

     ascorbic            No                       Notes:           [facility]



     acid      4-09                               (Same as:           l



               14:00:                               Vitamin C)           [facility]



               00                                                

 

     celecoxib            No                       Notes:           [facility]

a



               4-09                               NSAID.           l



               14:00:                               Please           [place]



               00                                 check           



                                                  indication           



                                                  . Not for           



                                                  seizure.           



                                                  (Same As:           



                                                  CeleBREX)           

 

     Lidoderm 5%            No                       Notes:           [facility]

[facility]



     topical      4-09                               Apply only           l



     film      14:00:                               once for           [place]



     (patch)      00                                 up to 12           



                                                  hours in a           



                                                  24-hour           



                                                  period (12           



                                                  hours on           



                                                  and 12           



                                                  hours           



                                                  off).           



                                                  (Same as:           



                                                  Aspercreme           



                                                  Lidocaine           



                                                  Patch)           



                                                  "Remove           



                                                  old patch           



                                                  before           



                                                  applicatio           



                                                  n of new           



                                                  patch"           

 

     zinc            No                       Notes:           [facility]



     sulfate      4-09                               (Zinc           l



               14:00:                               sulfate           [place]



               00                                 capsule) -           



                                                  220 mg           



                                                  Zinc           



                                                  sulfate =           



                                                  50 mg           



                                                  elemental           



                                                  zinc Same           



                                                  as Zinc           



                                                  Sulfate           

 

     cefepime +            No                       Notes:           [facility]

ia



     sterile      -                               (Same As:           l



     water 10 mL      12:00:                               Maxipime)                                            ***            



                                                  MEDICATION           



                                                  WASTE ***           



                                                  Product           



                                                  Size: 1000           



                                                  mg Product           



                                                  Wasted:           



                                                  _0__ mg           

 

     cefepime +            No                       Notes:           [facility]

ia



     sterile      -                               (Same As:           l



     water 10 mL      12:00:                               Maxipime)                                            ***            



                                                  MEDICATION           



                                                  WASTE ***           



                                                  Product           



                                                  Size: 1000           



                                                  mg Product           



                                                  Wasted:           



                                                  _0__ mg           

 

     cefepime +            No                       Notes:           [facility]

ia



     sterile      -                               (Same As:           l



     water 10 mL      12:00:                               Maxipime)                                            ***            



                                                  MEDICATION           



                                                  WASTE ***           



                                                  Product           



                                                  Size: 1000           



                                                  mg Product           



                                                  Wasted:           



                                                  _0__ mg           

 

     cefepime +            No                       Notes:           [facility]

ia



     sterile      -                               (Same As:           l



     water 10 mL      12:00:                               Maxipime)                                            ***            



                                                  MEDICATION           



                                                  WASTE ***           



                                                  Product           



                                                  Size: 1000           



                                                  mg Product           



                                                  Wasted:           



                                                  _0__ mg           

 

     hydromorpho            No                       Notes:           [facility]

[facility]



     ne        -                               (Same as:           l



               09:39:                               Dilaudid)           [facility]                                                

 

     hydromorpho            No                       Notes:           [facility]

[facility]



     ne        -                               (Same as:           l



               09:39:                               Dilaudid)           [facility]                                                

 

     hydromorpho            No                       Notes:           [facility]

[facility]



     ne        -                               (Same as:           l



               09:39:                               Dilaudid)           [place]                                                

 

     hydromorpho            No                       Notes:           [facility]

[facility]



     ne        -                               (Same as:           l



               09:39:                               Dilaudid)           [place]                                                

 

     methocarbam            No                       Notes:           [facility]

[facility]



     ol        -                               (Same           l



               05:00:                               as:Robaxin           [place]



                                                )              

 

     methocarbam            No                       Notes:           [facility]

[facility]



     ol        -                               (Same           l



               05:00:                               as:Robaxin           [place]



                                                )              

 

     methocarbam            No                       Notes:           [facility]

[facility]



     ol        -                               (Same           l



               05:00:                               as:Robaxin           [facility]                                 )              

 

     methocarbam            No                       Notes:           [facility]

[facility]



     ol        4-09                               (Same           l



               05:00:                               as:Robaxin           [facility]



               00                                 )              

 

     docusate            No                       Notes:           [facility]



               4-09                               (Same as:           l



               02:00:                               Colace)           [place]



               00                                 (Do Not           



                                                  Crush)           

 

     senna            No                       Notes:           [facility]



               4-09                               (Same as:           l



               02:00:                               Senokot)           [place]



               00                                                

 

     Saline            No                       Notes:           [facility]



     Flush 0.9%      4-09                               (Same as:           l



               02:00:                               BD             [place]



               00                                 Posiflush)           

 

     topiramate            No                       Notes:           [facility]

[facility]



               4-09                               (Same As:           l



               02:00:                               Topamax)           [facility]



               00                                 "Do Not           



                                                  Crush"           



                                                  Hazardous           



                                                  Drug Group           



                                                  3:Reproduc           



                                                  tive risk           



                                                  Hazardous           



                                                  Drug --           



                                                  Refer to           



                                                  safe           



                                                  handling           



                                                  procedure           



                                                  PPE Matrix           

 

     remove            No                       Notes:           [facility]



     patch      4-09                               Remove           l



               02:00:                               patch 12           [place]



               00                                 hours           



                                                  after           



                                                  applicatio           



                                                  n each           



                                                  day.           

 

     docusate            No                       Notes:           [facility]



               4-09                               (Same as:           l



               02:00:                               Colace)           [place]



               00                                 (Do Not           



                                                  Crush)           

 

     senna            No                       Notes:           [facility]



               4-09                               (Same as:           l



               02:00:                               Senokot)           [facility]



               00                                                

 

     Saline            No                       Notes:           [facility]



     Flush 0.9%      4-09                               (Same as:           l



               02:00:                               BD             [facility]



               00                                 Posiflush)           

 

     topiramate            No                       Notes:           [facility]

[facility]



               4-09                               (Same As:           l



               02:00:                               Topamax)           [place]



               00                                 "Do Not           



                                                  Crush"           



                                                  Hazardous           



                                                  Drug Group           



                                                  3:Reproduc           



                                                  tive risk           



                                                  Hazardous           



                                                  Drug --           



                                                  Refer to           



                                                  safe           



                                                  handling           



                                                  procedure           



                                                  [employer]PE [employer]           

 

     remove            No                       Notes:           [facility]



     patch      4-09                               Remove           l



               02:00:                               patch 12           [facility]



               00                                 hours           



                                                  after           



                                                  applicatio           



                                                  n each           



                                                  day.           

 

     docusate            No                       Notes:           [facility]



               4-09                               (Same as:           l



               02:00:                               Colace)           [place]



               00                                 (Do Not           



                                                  Crush)           

 

     senna            No                       Notes:           [facility]



               4-09                               (Same as:           l



               02:00:                               Senokot)           [facility]



               00                                                

 

     Saline            No                       Notes:           [facility]



     Flush 0.9%      4-09                               (Same as:           l



               02:00:                               BD             [facility]



               00                                 Posiflush)           

 

     topiramate            No                       Notes:           [facility]

[facility]



               4-09                               (Same As:           l



               02:00:                               Topamax)           [facility]



               00                                 "Do Not           



                                                  Crush"           



                                                  Hazardous           



                                                  Drug Group           



                                                  3:Reproduc           



                                                  tive risk           



                                                  Hazardous           



                                                  Drug --           



                                                  Refer to           



                                                  safe           



                                                  handling           



                                                  procedure           



                                                  PPE Matrix           

 

     remove            No                       Notes:           [facility]



     patch      4-09                               Remove           l



               02:00:                               patch 12           [place]



               00                                 hours           



                                                  after           



                                                  applicatio           



                                                  n each           



                                                  day.           

 

     docusate            No                       Notes:           [facility]



               4-09                               (Same as:           l



               02:00:                               Colace)           [place]



               00                                 (Do Not           



                                                  Crush)           

 

     senna            No                       Notes:           [facility]



               4-09                               (Same as:           l



               02:00:                               Senokot)           [facility]



               00                                                

 

     Saline            No                       Notes:           [facility]



     Flush 0.9%      4-09                               (Same as:           l



               02:00:                               BD             [facility]



               00                                 Posiflush)           

 

     topiramate            No                       Notes:           [facility]

[facility]



               4-09                               (Same As:           l



               02:00:                               Topamax)           [place]



               00                                 "Do Not           



                                                  Crush"           



                                                  Hazardous           



                                                  Drug Group           



                                                  3:Reproduc           



                                                  tive risk           



                                                  Hazardous           



                                                  Drug --           



                                                  Refer to           



                                                  safe           



                                                  handling           



                                                  procedure           



                                                  PPE Matrix           

 

     remove            No                       Notes:           [facility]



     patch      4-09                               Remove           l



               02:00:                               patch 12           [facility]



               00                                 hours           



                                                  after           



                                                  applicatio           



                                                  n each           



                                                  day.           

 

     acetaminoph            No                       Notes: [facility]           

[facility]



     en        4-09                               acetaminop           l



               01:00:                               hen 4000           [place]



               00                                 mg/day (4           



                                                  gm/day).           



                                                  (Same as:           



                                                  Tylenol           



                                                  Extra           



                                                  Strength)           

 

     acetaminoph            No                       Notes: [facility]           

[facility]



     en        4-09                               acetaminop           l



               01:00:                               hen 4000           [facility]



               00                                 mg/day (4           



                                                  gm/day).           



                                                  (Same as:           



                                                  Tylenol           



                                                  Extra           



                                                  Strength)           

 

     acetaminoph            No                       Notes: Max           

[facility]



     en        4-09                               acetaminop           l



               01:00:                               hen 4000           [facility]



               00                                 mg/day (4           



                                                  gm/day).           



                                                  (Same as:           



                                                  Tylenol           



                                                  Extra           



                                                  Strength)           

 

     acetaminoph            No                       Notes: Max           

[facility]



     en        4-09                               acetaminop           l



               01:00:                               hen 4000           [facility]



               00                                 mg/day (4           



                                                  gm/day).           



                                                  (Same as:           



                                                  Tylenol           



                                                  Extra           



                                                  Strength)           

 

     oxyCODONE            No                       Notes:           [facility]

a



     immediate      4-09                               (Same as:           l



     release      00:09:                               Roxicodone           [facility]

jorge



               00                                 )              

 

     acetaminoph            No                       Notes: Do           M

[facility]



     en-hydrocod      4-09                               not exceed           l



     one 325      00:09:                               4gm/day of           [facility]

jorge



     mg-10 mg      00                                 acetaminop           



     oral tablet                                         hen. (Same           



                                                  as: Norco           



                                                  325/10)           

 

     oxyCODONE            No                       Notes:           [facility]

a



     immediate      4-09                               (Same as:           l



     release      00:09:                               Roxicodone           [facility]

jorge



               00                                 )              

 

     acetaminoph            No                       Notes: Do           M

[facility]



     en-hydrocod      4-09                               not exceed           l



     one 325      00:09:                               4gm/day of           [facility]

jorge



     mg-10 mg      00                                 acetaminop           



     oral tablet                                         hen. (Same           



                                                  as: Norco           



                                                  325/10)           

 

     oxyCODONE            No                       Notes:           [facility]

a



     immediate      4-09                               (Same as:           l



     release      00:09:                               Roxicodone           [facility]

jorge



               00                                 )              

 

     acetaminoph            No                       Notes: Do           M

[facility]



     en-hydrocod      4-09                               not exceed           l



     one 325      00:09:                               4gm/day of           [facility]

jorge



     mg-10 mg      00                                 acetaminop           



     oral tablet                                         hen. (Same           



                                                  as: Norco           



                                                  325/10)           

 

     oxyCODONE            No                       Notes:           [facility]

a



     immediate      4-09                               (Same as:           l



     release      00:09:                               Roxicodone           [facility]

jorge



               00                                 )              

 

     acetaminoph            No                       Notes: Do           M

[facility]



     en-hydrocod      4-09                               not exceed           l



     one 325      00:09:                               4gm/day of           [facility]

jorge



     mg-10 mg      00                                 acetaminop           



     oral tablet                                         hen. (Same           



                                                  as: Norco           



                                                  325/10)           

 

     LORazepam            No                       Notes:           [facility]

a



               4-09                               (Same as:           l



               00:06:                               Ativan)           [facility]



               00                                                

 

     oxyCODONE            No                       Notes:           [facility]

a



     immediate      4-09                               (Same as:           l



     release      00:06:                               Roxicodone           [facility]

j[facility]



               00                                 )              

 

     LORazepam            No                       Notes:           [facility]

a



               4-09                               (Same as:           l



               00:06:                               Ativan)           [place]



               00                                                

 

     oxyCODONE            No                       Notes:           [facility]

a



     immediate      4-09                               (Same as:           l



     release      00:06:                               Roxicodone           [facility]

j[facility]



               00                                 )              

 

     LORazepam            No                       Notes:           [facility]

a



               4-09                               (Same as:           l



               00:06:                               Ativan)           [facility]



                                                               

 

     oxyCODONE            No                       Notes:           [facility]

a



     immediate      4-09                               (Same as:           l



     release      00:06:                               Roxicodone           [facility]

j[facility]



                                                )              

 

     LORazepam            No                       Notes:           [facility]

a



               4-09                               (Same as:           l



               00:06:                               Ativan)           [place]



                                                               

 

     oxyCODONE            No                       Notes:           [facility]

a



     immediate      4-09                               (Same as:           l



     release      00:06:                               Roxicodone           [facility]

[facility]



                                                )              

 

     Sodium            No                       1,000 mL,           [facility]

a



     Chloride                                     Rate: 75           l



     0.9% IV      23:43:                               ml/hr,           [facility]



     1,000 mL      00                                 Infuse           



                                                  over: 13.3           



                                                  hr, Route:           



                                                  IV, Dosing           



                                                  Weight           



                                                  127.727           



                                                  kg, Total           



                                                  Volume:           



                                                  1,000,           



                                                  Start           



                                                  date:           



                                                  22           



                                                  18:43:00           



                                                  CDT,           



                                                  Duration:           



                                                  30 day,           



                                                  Stop date:           



                                                  22           



                                                  18:42:00           



                                                  CDT, BSA:           



                                                  2.37 m2, 0           

 

     acetaminoph            No                       Notes: Do           M

[facility]



     en        4-08                               not exceed           l



               23:43:                               4 gm/day.           [facility]



               00                                 (Same as:           



                                                  Tylenol)           

 

     acetaminoph            No                       Notes:           [facility]

[facility]



     en-hydrocod      4-08                               (Same as:           l



     one 325      23:43:                               Norco           [facility]



     mg-5 mg      00                                 325/5) Do           



     oral tablet                                         not exceed           



                                                  4gm/day of           



                                                  acetaminop           



                                                  hen.           

 

     acetaminoph            No                       Notes: Do           M

[facility]



     en-hydrocod      4-08                               not exceed           l



     one 325      23:43:                               4gm/day of           [facility]

j[facility]



     mg-10 mg      00                                 acetaminop           



     oral tablet                                         hen. (Same           



                                                  as: Norco           



                                                  325/10)           

 

     bisacodyl            No                       Notes:           [facility]

a



               4-08                               (Same As:           l



               23:43:                               Dulcolax,           [facility]



               00                                 Bisco-Lax)           

 

     hydrALAZINE            No                       Notes:           [facility]

[facility]



               4-08                               (Same as:           l



               23:43:                               Apresoline           [place]



                                                ) Push           



                                                  over 5           



                                                  minutes           

 

     labetalol            No                       10 mg, 2           [facility]

[facility]



               4-08                               mL, Route:           l



               23:43:                               IVP, Drug                                            form: INJ,           



                                                  Q15Min,           



                                                  Dosing           



                                                  Weight           



                                                  127.727,           



                                                  kg, Start           



                                                  date:           



                                                  22           



                                                  18:43:00           



                                                  CDT,           



                                                  Duration:           



                                                  3 doses or           



                                                  times,           



                                                  Stop date:           



                                                  22           



                                                  19:13:00           



                                                  CDT, 0           

 

     Saline            No                       Notes:           [facility]



     Flush 0.9%      4-08                               (Same as:           l



               23:43:                               BD                                              Posiflush)           

 

     Sodium            No                       1,000 mL,           [facility]

a



     Chloride      08                               Rate: 75           l



     0.9% IV      23:43:                               ml/hr,           [facility]



     1,000 mL      00                                 Infuse           



                                                  over: 13.3           



                                                  hr, Route:           



                                                  IV, Dosing           



                                                  Weight           



                                                  127.727           



                                                  kg, Total           



                                                  Volume:           



                                                  1,000,           



                                                  Start           



                                                  date:           



                                                  22           



                                                  18:43:00           



                                                  CDT,           



                                                  Duration:           



                                                  30 day,           



                                                  Stop date:           



                                                  22           



                                                  18:42:00           



                                                  CDT, BSA:           



                                                  2.37 m2, 0           

 

     acetaminoph            No                       Notes: Do           M

[facility]



     en        4-08                               not exceed           l



               23:43:                               4 gm/day.           [facility]



               00                                 (Same as:           



                                                  Tylenol)           

 

     acetaminoph            No                       Notes:           [facility]

[facility]



     en-hydrocod      4-08                               (Same as:           l



     one 325      23:43:                               Norco           [facility]



     mg-5 mg      00                                 325/5) Do           



     oral tablet                                         not exceed           



                                                  4gm/day of           



                                                  acetaminop           



                                                  hen.           

 

     acetaminoph            No                       Notes: Do           M

[facility]



     en-hydrocod      4-08                               not exceed           l



     one 325      23:43:                               4gm/day of           [facility]

[facility]



     mg-10 mg      00                                 acetaminop           



     oral tablet                                         hen. (Same           



                                                  as: Norco           



                                                  325/10)           

 

     bisacodyl            No                       Notes:           [facility]

a



               4-08                               (Same As:           l



               23:43:                               Dulcolax,           [facility]



                                                Bisco-Lax)           

 

     hydrALAZINE            No                       Notes:           [facility]

[facility]



               4-08                               (Same as:           l



               23:43:                               Apresoline                                            ) Push           



                                                  over 5           



                                                  minutes           

 

     labetalol            No                       10 mg, 2           [facility]

[facility]



               4-08                               mL, Route:           l



               23:43:                               IVP, Drug                                            form: INJ,           



                                                  Q15Min,           



                                                  Dosing           



                                                  Weight           



                                                  127.727,           



                                                  kg, Start           



                                                  date:           



                                                  22           



                                                  18:43:00           



                                                  CDT,           



                                                  Duration:           



                                                  3 doses or           



                                                  times,           



                                                  Stop date:           



                                                  22           



                                                  19:13:00           



                                                  CDT, 0           

 

     Saline            No                       Notes:           [facility]



     Flush 0.9%      08                               (Same as:           l



               23:43:                               BD                                              Posiflush)           

 

     Sodium            No                       1,000 mL,           [facility]

a



     Chloride                                     Rate: 75           l



     0.9% IV      23:43:                               ml/hr,           [facility]



     1,000 mL      00                                 Infuse           



                                                  over: 13.3           



                                                  hr, Route:           



                                                  IV, Dosing           



                                                  Weight           



                                                  127.727           



                                                  kg, Total           



                                                  Volume:           



                                                  1,000,           



                                                  Start           



                                                  date:           



                                                  22           



                                                  18:43:00           



                                                  CDT,           



                                                  Duration:           



                                                  30 day,           



                                                  Stop date:           



                                                  22           



                                                  18:42:00           



                                                  CDT, BSA:           



                                                  2.37 m2, 0           

 

     acetaminoph            No                       Notes: Do           [facility]

[facility]



     en        4-08                               not exceed           l



               23:43:                               4 gm/day.           [facility]



               00                                 (Same as:           



                                                  Tylenol)           

 

     acetaminoph            No                       Notes:           [facility]

[facility]



     en-hydrocod      4-08                               (Same as:           l



     one 325      23:43:                               Norco           [facility]



     mg-5 mg      00                                 325/5) Do           



     oral tablet                                         not exceed           



                                                  4gm/day of           



                                                  acetaminop           



                                                  hen.           

 

     acetaminoph            No                       Notes: Do           M

[facility]



     en-hydrocod      4-08                               not exceed           l



     one 325      23:43:                               4gm/day of           [facility]

[facility]



     mg-10 mg      00                                 acetaminop           



     oral tablet                                         hen. (Same           



                                                  as: Norco           



                                                  325/10)           

 

     bisacodyl            No                       Notes:           [facility]

a



               4-08                               (Same As:           l



               23:43:                               Dulcolax,           [facility]



                                                Bisco-Lax)           

 

     hydrALAZINE            No                       Notes:           [facility]

[facility]



               4-08                               (Same as:           l



               23:43:                               Apresoline                                            ) Push           



                                                  over 5           



                                                  minutes           

 

     labetalol            No                       10 mg, 2           [facility]

[facility]



               4-08                               mL, Route:           l



               23:43:                               IVP, Drug                                            form: INJ,           



                                                  Q15Min,           



                                                  Dosing           



                                                  Weight           



                                                  127.727,           



                                                  kg, Start           



                                                  date:           



                                                  22           



                                                  18:43:00           



                                                  CDT,           



                                                  Duration:           



                                                  3 doses or           



                                                  times,           



                                                  Stop date:           



                                                  22           



                                                  19:13:00           



                                                  CDT, 0           

 

     Saline            No                       Notes:           [facility]



     Flush 0.9%      4-08                               (Same as:           l



               23:43:                               BD             [place]



                                                Posiflush)           

 

     Sodium            No                       1,000 mL,           [facility]

a



     Chloride      4-08                               Rate: 75           l



     0.9% IV      23:43:                               ml/hr,           Jose



     1,000 mL      00                                 Infuse           



                                                  over: 13.3           



                                                  hr, Route:           



                                                  IV, Dosing           



                                                  Weight           



                                                  127.727           



                                                  kg, Total           



                                                  Volume:           



                                                  1,000,           



                                                  Start           



                                                  date:           



                                                  22           



                                                  18:43:00           



                                                  CDT,           



                                                  Duration:           



                                                  30 day,           



                                                  Stop date:           



                                                  22           



                                                  18:42:00           



                                                  CDT, BSA:           



                                                  2.37 m2, 0           

 

     acetaminoph            No                       Notes: Do           M

[facility]



     en        4-08                               not exceed           l



               23:43:                               4 gm/day.           Jose



               00                                 (Same as:           



                                                  Tylenol)           

 

     acetaminoph            No                       Notes:           [facility]

rajeev



     en-hydrocod      4-08                               (Same as:           l



     one 325      23:43:                               Norco           Jose



     mg-5 mg      00                                 325/5) Do           



     oral tablet                                         not exceed           



                                                  4gm/day of           



                                                  acetaminop           



                                                  hen.           

 

     acetaminoph            No                       Notes: Do           M

[facility]



     en-hydrocod      4-08                               not exceed           l



     one 325      23:43:                               4gm/day of           [facility]

jorge



     mg-10 mg      00                                 acetaminop           



     oral tablet                                         hen. (Same           



                                                  as: Norco           



                                                  325/10)           

 

     bisacodyl            No                       Notes:           [facility]

a



               4-08                               (Same As:           l



               23:43:                               Dulcolax,           Jose



                                                Bisco-Lax)           

 

     hydrALAZINE            No                       Notes:           [facility]

rajeev



               4-08                               (Same as:           l



               23:43:                               Apresoline                                            ) Push           



                                                  over 5           



                                                  minutes           

 

     labetalol            No                       10 mg, 2           [facility]

rajeev



               4-08                               mL, Route:           l



               23:43:                               IVP, Drug                                            form: INJ,           



                                                  Q15Min,           



                                                  Dosing           



                                                  Weight           



                                                  127.727,           



                                                  kg, Start           



                                                  date:           



                                                  22           



                                                  18:43:00           



                                                  CDT,           



                                                  Duration:           



                                                  3 doses or           



                                                  times,           



                                                  Stop date:           



                                                  22           



                                                  19:13:00           



                                                  CDT, 0           

 

     Saline      -0      No                       Notes:           [facility]



     Flush 0.9%                                     (Same as:           l



               23:43:                               BD             Jose



               00                                 Posiflush)           

 

     NaCl 0.9%      2022- No             1000mL      at 999           Uni

vers



     (NS) bolus                                mL/hr,           ity of



     infusion      05:00: 05:59                          1,000 mL,           Eric

as



     1,000 mL      00   :00                           IV             Medical



                                                  Piggyback,           Branch



                                                  ONCE, 1           



                                                  dose, On           



                                                  22           



                                                  at 0000,           



                                                  STAT           

 

     diphenhydrA      2022- No             25mg      25 mg,           Uni

vers



     MINE                                Slow IV           ity of



     (BENADRYL)      05:00: 04:47                          Push,           [place]



     injection      00   :00                           ONCE, 1           Medical



     25 mg                                         dose, On           Branch



                                                  Mon 22           



                                                  at 0000,           



                                                  STAT           

 

     haloperidol      2022- No             2.5mg      2.5 mg,           U

nivers



     lactate                                Intravenou           ity o

f



     (HALDOL)      05:00: 04:47                          s, ONCE, 1           Te

[facility]



     injection      00   :00                           dose, On           [facility]

l



     2.5 mg                                         Mon 22           Branch



                                                  at 0000,           



                                                  STAT           

 

     topiramate            Yes                      25 mg = 1           Me

moria



     25 mg oral      3-29                               cap, PO,           l



     capsule      13:44:                               Q12H, # 60           [facility]

jorge



               00                                 cap, 0           



                                                  Refill(s),           



                                                  Pharmacy:           



                                                  [facility]           



                                                  [facility] #[id number],           



                                                  149.86,           



                                                  cm,            



                                                  22           



                                                  1:18:00           



                                                  CDT,           



                                                  Height,           



                                                  127.727,           



                                                  kg,            



                                                  22           



                                                  1:18:00           



                                                  CDT,           



                                                  Weight           

 

     topiramate      -0      Yes                      25 mg = 1           Me

moria



     25 mg oral      3-29                               cap, PO,           l



     capsule      13:44:                               Q12H, # 60           Herm

jorge



               00                                 cap, 0           



                                                  Refill(s),           



                                                  Pharmacy:           



                                                  [facility]           



                                                  [facility] #[id number],           



                                                  149.86,           



                                                  cm,            



                                                  22           



                                                  1:18:00           



                                                  CDT,           



                                                  Height,           



                                                  127.727,           



                                                  kg,            



                                                  22           



                                                  1:18:00           



                                                  CDT,           



                                                  Weight           

 

     topiramate      -0      Yes                      25 mg = 1           Me

moria



     25 mg oral      3-29                               cap, PO,           l



     capsule      13:44:                               Q12H, # 60           Herm

jorge



               00                                 cap, 0           



                                                  Refill(s),           



                                                  Pharmacy:           



                                                  [facility] #[id number],           



                                                  149.86,           



                                                  cm,            



                                                  22           



                                                  1:18:00           



                                                  CDT,           



                                                  Height,           



                                                  127.727,           



                                                  kg,            



                                                  22           



                                                  1:18:00           



                                                  CDT,           



                                                  Weight           

 

     topiramate            Yes                      25 mg = 1           Me

moria



     25 mg oral      3-29                               cap, PO,           l



     capsule      13:44:                               Q12H, # 60           Herm

jorge



               00                                 cap, 0           



                                                  Refill(s),           



                                                  Pharmacy:           



                                                  [facility] #[id number],           



                                                  149.86,           



                                                  cm,            



                                                  22           



                                                  1:18:00           



                                                  CDT,           



                                                  Height,           



                                                  127.727,           



                                                  kg,            



                                                  22           



                                                  1:18:00           



                                                  CDT,           



                                                  Weight           

 

     topiramate            No                       Notes:           [facility]

ia



               3-28                               Hazardous           l



               22:05:                               Drug Group                                            3:Reproduc           



                                                  tive risk           



                                                  Hazardous           



                                                  Drug --           



                                                  Refer to           



                                                  safe           



                                                  handling           



                                                  procedure           



                                                  PPE Matrix           



                                                  Sprinkle           



                                                  formulatio           



                                                  n. (Same           



                                                  As:            



                                                  Topamax)           

 

     topiramate            No                       Notes:           [facility]

ia



               3-28                               Hazardous           l



               22:05:                               Drug Group           Jose                                 3:Reproduc           



                                                  tive risk           



                                                  Hazardous           



                                                  Drug --           



                                                  Refer to           



                                                  safe           



                                                  handling           



                                                  procedure           



                                                  PPE Matrix           



                                                  Sprinkle           



                                                  formulatio           



                                                  n. (Same           



                                                  As:            



                                                  Topamax)           

 

     topiramate            No                       Notes:           [facility]

ia



               3-28                               Hazardous           l



               22:05:                               Drug Group           Jose                                 3:Reproduc           



                                                  tive risk           



                                                  Hazardous           



                                                  Drug --           



                                                  Refer to           



                                                  safe           



                                                  handling           



                                                  procedure           



                                                  PPE Matrix           



                                                  Sprinkle           



                                                  formulatio           



                                                  n. (Same           



                                                  As:            



                                                  Topamax)           

 

     topiramate            No                       Notes:           [facility]

ia



               3-28                               Hazardous           l



               22:05:                               Drug Group           [place]                                 3:Reproduc           



                                                  tive risk           



                                                  Hazardous           



                                                  Drug --           



                                                  Refer to           



                                                  safe           



                                                  handling           



                                                  procedure           



                                                  PPE Matrix           



                                                  Sprinkle           



                                                  formulatio           



                                                  n. (Same           



                                                  As:            



                                                  Topamax)           

 

     Sonia            No                       Notes:           [facility]



     packet      3-28                               (Same as:           l



               21:30:                               Sonia           Jose



               00                                 Unflavored           



                                                  )              



                                                  Administer           



                                                  ing SONIA           



                                                  orally:           



                                                  Mix into           



                                                  8-10 fl oz           



                                                  of room           



                                                  temperatur           



                                                  e juice,           



                                                  soda, or           



                                                  water.           



                                                  Also mixes           



                                                  well with           



                                                  warm           



                                                  beverages,           



                                                  like           



                                                  coffee or           



                                                  tea. Can           



                                                  be mixed           



                                                  with           



                                                  applesauce           



                                                  .              



                                                  Thoroughly           



                                                  stir for           



                                                  30-60           



                                                  seconds or           



                                                  until           



                                                  completely           



                                                  dissolved           

 

     Sonia            No                       Notes:           [facility]



     packet      3-                               (Same as:           l



               21:30:                               Sonia           [facility]



               00                                 Unflavored           



                                                  )              



                                                  Administer           



                                                  ing SONIA           



                                                  orally:           



                                                  Mix into           



                                                  8-10 fl oz           



                                                  of room           



                                                  temperatur           



                                                  e juice,           



                                                  soda, or           



                                                  water.           



                                                  Also mixes           



                                                  well with           



                                                  warm           



                                                  beverages,           



                                                  like           



                                                  coffee or           



                                                  tea. Can           



                                                  be mixed           



                                                  with           



                                                  applesauce           



                                                  .              



                                                  Thoroughly           



                                                  stir for           



                                                  30-60           



                                                  seconds or           



                                                  until           



                                                  completely           



                                                  dissolved           

 

     Sonia            No                       Notes:           [facility]



     packet      3-                               (Same as:           l



               21:30:                               Sonia           [place]



               00                                 Unflavored           



                                                  )              



                                                  Administer           



                                                  ing SONIA           



                                                  orally:           



                                                  Mix into           



                                                  8-10 fl oz           



                                                  of room           



                                                  temperatur           



                                                  e juice,           



                                                  soda, or           



                                                  water.           



                                                  Also mixes           



                                                  well with           



                                                  warm           



                                                  beverages,           



                                                  like           



                                                  coffee or           



                                                  tea. Can           



                                                  be mixed           



                                                  with           



                                                  applesauce           



                                                  .              



                                                  Thoroughly           



                                                  stir for           



                                                  30-60           



                                                  seconds or           



                                                  until           



                                                  completely           



                                                  dissolved           

 

     Sonia            No                       Notes:           [facility]



     packet      3-                               (Same as:           l



               21:30:                               Sonia           [facility]



               00                                 Unflavored           



                                                  )              



                                                  Administer           



                                                  ing SONIA           



                                                  orally:           



                                                  Mix into           



                                                  8-10 fl oz           



                                                  of room           



                                                  temperatur           



                                                  e juice,           



                                                  soda, or           



                                                  water.           



                                                  Also mixes           



                                                  well with           



                                                  warm           



                                                  beverages,           



                                                  like           



                                                  coffee or           



                                                  tea. Can           



                                                  be mixed           



                                                  with           



                                                  applesauce           



                                                  .              



                                                  Thoroughly           



                                                  stir for           



                                                  30-60           



                                                  seconds or           



                                                  until           



                                                  completely           



                                                  dissolved           

 

     Lovenox            No                       Notes:           [facility]



               3-27                               (Same as:           l



               16:00:                               Lovenox)           [facility]



                                                               

 

     Lovenox            No                       Notes:           [facility]



               3-27                               (Same as:           l



               16:00:                               Lovenox)           [facility]



                                                               

 

     Lovenox            No                       Notes:           [facility]



               3-27                               (Same as:           l



               16:00:                               Lovenox)           [facility]



                                                               

 

     Lovenox            No                       Notes:           [facility]



               3-27                               (Same as:           l



               16:00:                               Lovenox)           [facility]                                                

 

     Magnesium            No                       Notes:           [facility]

a



     Sulfate      3-27                               WASTE: F/P           l



               15:54:                               - Sink; E           [facility]



               00                                 -              



                                                  Municipal           



                                                  Trash Bin           

 

     methylPREDN            No                       Notes:           [facility]

[facility]



     ISolone      3-27                               (Same           l



     SODium      15:54:                               as:Solu-ME           [facility]

nn



     SUCCinate      00                                 DROL,           



                                                  A-Methapre           



                                                  d) ***           



                                                  MEDICATION           



                                                  WASTE ***           



                                                  Product           



                                                  Size: 1000           



                                                  mg Product           



                                                  Wasted:           



                                                  ___ mg           

 

     Magnesium            No                       Notes:           [facility]

a



     Sulfate      3-27                               WASTE: F/P           l



               15:54:                               - Sink; E                                            -              



                                                  Municipal           



                                                  Trash Bin           

 

     methylPREDN            No                       Notes:           [facility]

[facility]



     ISolone      3-27                               (Same           l



     SODium      15:54:                               as:Solu-ME           [facility]

nn



     SUCCinate      00                                 DROL,           



                                                  A-Methapre           



                                                  d) ***           



                                                  MEDICATION           



                                                  WASTE ***           



                                                  Product           



                                                  Size: 1000           



                                                  mg Product           



                                                  Wasted:           



                                                  ___ mg           

 

     Magnesium            No                       Notes:           [facility]

a



     Sulfate      3-27                               WASTE: F/P           l



               15:54:                               - Sink; E                                            -              



                                                  Municipal           



                                                  Trash Bin           

 

     methylPREDN            No                       Notes:           [facility]

[facility]



     ISolone      3-27                               (Same           l



     SODium      15:54:                               as:Solu-ME           [facility]

nn



     SUCCinate      00                                 DROL,           



                                                  A-Methapre           



                                                  d) ***           



                                                  MEDICATION           



                                                  WASTE ***           



                                                  Product           



                                                  Size: 1000           



                                                  mg Product           



                                                  Wasted:           



                                                  ___ mg           

 

     Magnesium            No                       Notes:           [facility]

a



     Sulfate      3-27                               WASTE: F/P           l



               15:54:                               - Sink; E                                            -              



                                                  Municipal           



                                                  Trash Bin           

 

     methylPREDN            No                       Notes:           [facility]

[facility]



     ISolone      3-27                               (Same           l



     SODium      15:54:                               as:Solu-ME           [facility]

nn



     SUCCinate      00                                 DROL,           



                                                  A-Methapre           



                                                  d) ***           



                                                  MEDICATION           



                                                  WASTE ***           



                                                  Product           



                                                  Size: 1000           



                                                  mg Product           



                                                  Wasted:           



                                                  ___ mg           

 

     Dilaudid            No                       Notes:           [facility]



               3-27                               (Same as:           l



               13:28:                               Dilaudid)           [facility]



                                                               

 

     Dilaudid            No                       Notes:           [facility]



               3-27                               (Same as:           l



               13:28:                               Dilaudid)           [facility]



                                                               

 

     Dilaudid            No                       Notes:           [facility]



               3-27                               (Same as:           l



               13:28:                               Dilaudid)           [facility]



                                                               

 

     Dilaudid            No                       Notes:           [facility]



               3-27                               (Same as:           l



               13:28:                               Dilaudid)                                                           

 

     remove            No                       Notes:           [facility]



     patch      3-27                               Remove           l



               02:00:                               patch 12           [facility]



               00                                 hours           



                                                  after           



                                                  applicatio           



                                                  n each           



                                                  day.           

 

     remove            No                       Notes:           [facility]



     patch      3-27                               Remove           l



               02:00:                               patch 12           [facility]



               00                                 hours           



                                                  after           



                                                  applicatio           



                                                  n each           



                                                  day.           

 

     remove            No                       Notes:           [facility]



     patch      3-27                               Remove           l



               02:00:                               patch 12           [facility]



               00                                 hours           



                                                  after           



                                                  applicatio           



                                                  n each           



                                                  day.           

 

     remove            No                       Notes:           [facility]



     patch      3-27                               Remove           l



               02:00:                               patch 12           [facility]



               00                                 hours           



                                                  after           



                                                  applicatio           



                                                  n each           



                                                  day.           

 

     Lyrica            No                       Notes:           [facility]



               3-26                               (Same as:           l



               21:58:                               Lyrica)           [facility]



                                                               

 

     Naproxen            No                       Notes:           [facility]



               3-26                               (Same as:           l



               21:58:                               Naprosyn)           [facility]



                                                Take with           



                                                  food.           

 

     Lyrica            No                       Notes:           [facility]



               3-26                               (Same as:           l



               21:58:                               Lyrica)           [facility]



                                                               

 

     Naproxen            No                       Notes:           [facility]



               3-26                               (Same as:           l



               21:58:                               Naprosyn)           [facility]



                                                Take with           



                                                  food.           

 

     Lyrica            No                       Notes:           [facility]



               3-26                               (Same as:           l



               21:58:                               Lyrica)           [facility]



                                                               

 

     Naproxen            No                       Notes:           [facility]



               3-26                               (Same as:           l



               21:58:                               Naprosyn)           [facility]



                                                Take with           



                                                  food.           

 

     Lyrica            No                       Notes:           [facility]



               3-26                               (Same as:           l



               21:58:                               Lyrica)           [facility]



                                                               

 

     Naproxen            No                       Notes:           [facility]



               3-26                               (Same as:           l



               21:58:                               Naprosyn)           [facility]                                 Take with           



                                                  food.           

 

     Ascorbic            No                       Notes:           [facility]



     Acid      3-26                               (Same as:           l



               14:00:                               Vitamin C)           [facility]                                                

 

     Zinc            No                       Notes:           [facility]



     Sulfate      3-26                               (Zinc           l



               14:00:                               sulfate           [facility]



                                                capsule) -           



                                                  220 mg           



                                                  Zinc           



                                                  sulfate =           



                                                  50 mg           



                                                  elemental           



                                                  zinc Same           



                                                  as Zinc           



                                                  Sulfate           

 

     multivitami            No                       Notes:           [facility]

[facility]



     n         3-26                               (Same           l



               14:00:                               as:Thera)                                            WASTE: F/P           



                                                  - Black; E           



                                                  -              



                                                  Municipal           



                                                  Trash Bin           



                                                  Take with           



                                                  food.           

 

     Lidocaine            No                       Notes:           [facility]

a



     0.05 MG/MG      3-26                               Apply only           l



     Transdermal      14:00:                               once for           [facility]

[facility]



     Patch      00                                 up to 12           



                                                  hours in a           



                                                  24-hour           



                                                  period (12           



                                                  hours on           



                                                  and 12           



                                                  hours           



                                                  off).           



                                                  (Same as:           



                                                  Aspercreme           



                                                  Lidocaine           



                                                  Patch)           



                                                  "Remove           



                                                  old patch           



                                                  before           



                                                  applicatio           



                                                  n of new           



                                                  patch"           

 

     Ascorbic            No                       Notes:           [facility]



     Acid      3-26                               (Same as:           l



               14:00:                               Vitamin C)           [facility]



                                                               

 

     Zinc            No                       Notes:           [facility]



     Sulfate      3-26                               (Zinc           l



               14:00:                               sulfate           [facility]



               00                                 capsule) -           



                                                  220 mg           



                                                  Zinc           



                                                  sulfate =           



                                                  50 mg           



                                                  elemental           



                                                  zinc Same           



                                                  as Zinc           



                                                  Sulfate           

 

     multivitami            No                       Notes:           [facility]

[facility]



     n         3-26                               (Same           l



               14:00:                               as:Thera)           [facility]                                 WASTE: F/P           



                                                  - Black; E           



                                                  -              



                                                  Municipal           



                                                  Trash Bin           



                                                  Take with           



                                                  food.           

 

     Lidocaine            No                       Notes:           [facility]

a



     0.05 MG/MG      3-26                               Apply only           l



     Transdermal      14:00:                               once for           [facility]

[facility]



     Patch      00                                 up to 12           



                                                  hours in a           



                                                  24-hour           



                                                  period (12           



                                                  hours on           



                                                  and 12           



                                                  hours           



                                                  off).           



                                                  (Same as:           



                                                  Aspercreme           



                                                  Lidocaine           



                                                  Patch)           



                                                  "Remove           



                                                  old patch           



                                                  before           



                                                  applicatio           



                                                  n of new           



                                                  patch"           

 

     Ascorbic            No                       Notes:           [facility]



     Acid      3-26                               (Same as:           l



               14:00:                               Vitamin C)                                                           

 

     Zinc            No                       Notes:           [facility]



     Sulfate      3-26                               (Zinc           l



               14:00:                               sulfate           [facility]



               00                                 capsule) -           



                                                  220 mg           



                                                  Zinc           



                                                  sulfate =           



                                                  50 mg           



                                                  elemental           



                                                  zinc Same           



                                                  as Zinc           



                                                  Sulfate           

 

     multivitami            No                       Notes:           [facility]

rajeev



     n         3-26                               (Same           l



               14:00:                               as:Thera)           Jose                                 WASTE: F/P           



                                                  - Black; E           



                                                  -              



                                                  Municipal           



                                                  Trash Bin           



                                                  Take with           



                                                  food.           

 

     Lidocaine            No                       Notes:           [facility]

a



     0.05 MG/MG      3-26                               Apply only           l



     Transdermal      14:00:                               once for           He

[facility]ann



     Patch      00                                 up to 12           



                                                  hours in a           



                                                  24-hour           



                                                  period (12           



                                                  hours on           



                                                  and 12           



                                                  hours           



                                                  off).           



                                                  (Same as:           



                                                  Aspercreme           



                                                  Lidocaine           



                                                  Patch)           



                                                  "Remove           



                                                  old patch           



                                                  before           



                                                  applicatio           



                                                  n of new           



                                                  patch"           

 

     Ascorbic            No                       Notes:           [facility]



     Acid      3-26                               (Same as:           l



               14:00:                               Vitamin C)           [facility]



                                                               

 

     Zinc            No                       Notes:           [facility]



     Sulfate      3-26                               (Zinc           l



               14:00:                               sulfate                                            capsule) -           



                                                  220 mg           



                                                  Zinc           



                                                  sulfate =           



                                                  50 mg           



                                                  elemental           



                                                  zinc Same           



                                                  as Zinc           



                                                  Sulfate           

 

     multivitami            No                       Notes:           [facility]

rajeev



     n         3-                               (Same           l



               14:00:                               as:Thera)                                            WASTE: F/P           



                                                  - Black; E           



                                                  -              



                                                  Municipal           



                                                  Trash Bin           



                                                  Take with           



                                                  food.           

 

     Lidocaine            No                       Notes:           [facility]

a



     0.05 MG/MG      3-26                               Apply only           l



     Transdermal      14:00:                               once for           He

rmann



     Patch      00                                 up to 12           



                                                  hours in a           



                                                  24-hour           



                                                  period (12           



                                                  hours on           



                                                  and 12           



                                                  hours           



                                                  off).           



                                                  (Same as:           



                                                  Aspercreme           



                                                  Lidocaine           



                                                  Patch)           



                                                  "Remove           



                                                  old patch           



                                                  before           



                                                  applicatio           



                                                  n of new           



                                                  patch"           

 

     Celebrex            No                       Notes:           [facility]



               3-                               NSAID.           l



               03:15:                               Please           [place]                                 check           



                                                  indication           



                                                  . Not for           



                                                  seizure.           



                                                  (Same As:           



                                                  CeleBREX)           

 

     Robaxin            No                       Notes:           [facility]



               3-                               (Same           l



               03:15:                               as:Robaxin                                            )              

 

     gabapentin            No                       Notes:           [facility]

ia



               3-                               (Same as:           l



               03:15:                               Neurontin)                                                           

 

     Tramadol            No                       Notes: Not           [facility]

[facility]



               3-                               to exceed           l



               03:15:                               400mg/day.           [place]



                                                (Same As:           



                                                  Ultram)           

 

     Celebrex            No                       Notes:           [facility]



               3-                               NSAID.           l



               03:15:                               Please           [facility]                                 check           



                                                  indication           



                                                  . Not for           



                                                  seizure.           



                                                  (Same As:           



                                                  CeleBREX)           

 

     Robaxin            No                       Notes:           [facility]



               3-26                               (Same           l



               03:15:                               as:Robaxin                                            )              

 

     gabapentin            No                       Notes:           [facility]

ia



               3-                               (Same as:           l



               03:15:                               Neurontin)                                                           

 

     Tramadol            No                       Notes: Not           [facility]

[facility]



               3-                               to exceed           l



               03:15:                               400mg/day.           [facility]



                                                (Same As:           



                                                  Ultram)           

 

     Celebrex            No                       Notes:           [facility]



               3-26                               NSAID.           l



               03:15:                               Please           [facility]                                 check           



                                                  indication           



                                                  . Not for           



                                                  seizure.           



                                                  (Same As:           



                                                  CeleBREX)           

 

     Robaxin            No                       Notes:           [facility]



               3-26                               (Same           l



               03:15:                               as:Robaxin           [facility]



               00                                 )              

 

     gabapentin            No                       Notes:           [facility]

ia



               3-26                               (Same as:           l



               03:15:                               Neurontin)           [place]                                                

 

     Tramadol            No                       Notes: Not           [facility]

oria



               3-                               to exceed           l



               03:15:                               400mg/day.           [facility]



               00                                 (Same As:           



                                                  Ultram)           

 

     Celebrex            No                       Notes:           [facility]



               3-                               NSAID.           l



               03:15:                               Please           [place]                                 check           



                                                  indication           



                                                  . Not for           



                                                  seizure.           



                                                  (Same As:           



                                                  CeleBREX)           

 

     Robaxin            No                       Notes:           [facility]



               3-26                               (Same           l



               03:15:                               as:Robaxin           [place]



               00                                 )              

 

     gabapentin            No                       Notes:           [facility]

ia



               3-26                               (Same as:           l



               03:15:                               Neurontin)           [facility]                                                

 

     Tramadol            No                       Notes: Not           [facility]

oria



               3-                               to exceed           l



               03:15:                               400mg/day.           [facility]                                 (Same As:           



                                                  Ultram)           

 

     Dexamethaso            No                       Notes: ***           

[facility]



     ne        3-26                               MEDICATION           l



               03:00:                               WASTE ***           [place]                                 Product           



                                                  Size: 10           



                                                  mg Product           



                                                  Wasted:           



                                                  ___ mg           

 

     Dexamethaso            No                       Notes: ***           

[facility]



     ne        3-26                               MEDICATION           l



               03:00:                               WASTE ***           [facility]                                 Product           



                                                  Size: 10           



                                                  mg Product           



                                                  Wasted:           



                                                  ___ mg           

 

     Dexamethaso            No                       Notes: ***           

[facility]



     ne        3-26                               MEDICATION           l



               03:00:                               WASTE ***           [facility]                                 Product           



                                                  Size: 10           



                                                  mg Product           



                                                  Wasted:           



                                                  ___ mg           

 

     Dexamethaso      -0      No                       Notes: ***           

[facility]



     ne        3-26                               MEDICATION           l



               03:00:                               WASTE ***           [place]



                                                Product           



                                                  Size: 10           



                                                  mg Product           



                                                  Wasted:           



                                                  ___ mg           

 

     Dilaudid            No                       Notes:           [facility]



               3-26                               Same as           l



               02:58:                               Dilaudid           [facility]



               00                                                

 

     Dilaudid            No                       Notes:           [facility]



               3-                               Same as           l



               02:58:                               Dilaudid           [place]



               00                                                

 

     Dilaudid            No                       Notes:           [facility]



               3-                               Same as           l



               02:58:                               Dilaudid           [facility]



               00                                                

 

     Dilaudid      -      No                       Notes:           [facility]



               3-                               Same as           l



               02:58:                               Dilaudid           [facility]



               00                                                

 

     Acetaminoph            No                       Notes: Max           

[facility]



     en        3-25                               acetaminop           l



               22:38:                               hen =           [facility]



               00                                 4000mg/day           



                                                  (4             



                                                  gm/day).           



                                                  (Same as:           



                                                  Tylenol)           

 

     Acetaminoph            No                       Notes: Max           

[facility]



     en        3-25                               acetaminop           l



               22:38:                               hen =           [place]



               00                                 4000mg/day           



                                                  (4             



                                                  gm/day).           



                                                  (Same as:           



                                                  Tylenol)           

 

     Acetaminoph            No                       Notes: Max           

[facility]



     en        3-25                               acetaminop           l



               22:38:                               hen =           [place]



               00                                 4000mg/day           



                                                  (4             



                                                  gm/day).           



                                                  (Same as:           



                                                  Tylenol)           

 

     Acetaminoph            No                       Notes: Max           

[facility]



     en        3-25                               acetaminop           l



               22:38:                               hen =           [facility]



               00                                 4000mg/day           



                                                  (4             



                                                  gm/day).           



                                                  (Same as:           



                                                  Tylenol)           

 

     Isolyte S            No                       Notes:           [facility]

a



     PH-7.4      3-25                               (Same as:           l



     (Bolus) IV      22:37:                               Isolyte S           He

rmann



               00                                 PH7.4,           



                                                  Normosol-R           



                                                  PH 7.4,           



                                                  Plasma-Lyt           



                                                  e A )           

 

     Magnesium            No                       Notes:           [facility]

a



     Sulfate      3-25                               WASTE: F/P           l



               22:37:                               - Sink; E           [facility]



                                                -              



                                                  Municipal           



                                                  Trash Bin           

 

     Isolyte S            No                       Notes:           [facility]

a



     PH-7.4      3-25                               (Same as:           l



     (Bolus) IV      22:37:                               Isolyte S           He

rmann



               00                                 PH7.4,           



                                                  Normosol-R           



                                                  PH 7.4,           



                                                  Plasma-Lyt           



                                                  e A )           

 

     Magnesium            No                       Notes:           [facility]

a



     Sulfate      3-25                               WASTE: F/P           l



               22:37:                               - Sink; E           [facility]



               00                                 -              



                                                  Municipal           



                                                  Trash Bin           

 

     Isolyte S            No                       Notes:           [facility]

a



     PH-7.4      3-25                               (Same as:           l



     (Bolus) IV      22:37:                               Isolyte S           He

rmann



               00                                 PH7.4,           



                                                  Normosol-R           



                                                  PH 7.4,           



                                                  Plasma-Lyt           



                                                  e A )           

 

     Magnesium            No                       Notes:           [facility]

a



     Sulfate      3-25                               WASTE: F/P           l



               22:37:                               - Sink; E           [place]



               00                                 -              



                                                  Municipal           



                                                  Trash Bin           

 

     Isolyte S      2022-0      No                       Notes:           [facility]

a



     PH-7.4      3-25                               (Same as:           l



     (Bolus) IV      22:37:                               Isolyte S           He

[facility]



               00                                 PH7.4,           



                                                  Normosol-R           



                                                  PH 7.4,           



                                                  Plasma-Lyt           



                                                  e A )           

 

     Magnesium            No                       Notes:           [facility]

a



     Sulfate      3-25                               WASTE: F/P           l



               22:37:                               - Sink; E           [place]



                                                -              



                                                  [facility]           



                                                  Trash Bin           

 

     Iohexol      0      No                       100 mL,           [facility]



               3-25                               Route:           l



               20:04:                               IVP, Drug           [place]



                                                Form:           



                                                  SOLN,           



                                                  Dosing           



                                                  Weight           



                                                  127.727,           



                                                  kg,            



                                                  ONCALL,           



                                                  STAT,           



                                                  Start           



                                                  date:           



                                                  22           



                                                  15:04:00           



                                                  CDT,           



                                                  Duration:           



                                                  1 doses or           



                                                  times,           



                                                  Dose =           



                                                  2.2ml/kg,           



                                                  Max dose =           



                                                  100ml --           



                                                  "To be           



                                                  infused by           



                                                  Radiology           



                                                  Staff           



                                                  ONLY"           

 

     Iohexol      0      No                       100 mL,           [facility]



               3-25                               Route:           l



               20:04:                               IVP, Drug           [facility]



                                                Form:           



                                                  SOLN,           



                                                  Dosing           



                                                  Weight           



                                                  127.727,           



                                                  kg,            



                                                  ONCALL,           



                                                  STAT,           



                                                  Start           



                                                  date:           



                                                  22           



                                                  15:04:00           



                                                  CDT,           



                                                  Duration:           



                                                  1 doses or           



                                                  times,           



                                                  Dose =           



                                                  2.2ml/kg,           



                                                  Max dose =           



                                                  100ml --           



                                                  "To be           



                                                  infused by           



                                                  Radiology           



                                                  Staff           



                                                  ONLY"           

 

     Iohexol      0      No                       100 mL,           [facility]



               3-25                               Route:           l



               20:04:                               IVP, Drug           [facility]



                                                Form:           



                                                  SOLN,           



                                                  Dosing           



                                                  Weight           



                                                  127.727,           



                                                  kg,            



                                                  ONCALL,           



                                                  STAT,           



                                                  Start           



                                                  date:           



                                                  22           



                                                  15:04:00           



                                                  CDT,           



                                                  Duration:           



                                                  1 doses or           



                                                  times,           



                                                  Dose =           



                                                  2.2ml/kg,           



                                                  Max dose =           



                                                  100ml --           



                                                  "To be           



                                                  infused by           



                                                  Radiology           



                                                  Staff           



                                                  ONLY"           

 

     Iohexol      0      No                       100 mL,           [facility]



               3-25                               Route:           l



               20:04:                               IVP, Drug           [facility]



               00                                 Form:           



                                                  SOLN,           



                                                  Dosing           



                                                  Weight           



                                                  127.727,           



                                                  kg,            



                                                  ONCALL,           



                                                  STAT,           



                                                  Start           



                                                  date:           



                                                  22           



                                                  15:04:00           



                                                  CDT,           



                                                  Duration:           



                                                  1 doses or           



                                                  times,           



                                                  Dose =           



                                                  2.2ml/kg,           



                                                  Max dose =           



                                                  100ml --           



                                                  "To be           



                                                  infused by           



                                                  Radiology           



                                                  Staff           



                                                  ONLY"           

 

     Docusate            No                       Notes:           [facility]



               3-25                               (Same as:           l



               14:00:                               Colace)           [facility]



                                                (Do Not           



                                                  Crush)           

 

     sennosides,            No                       Notes:           [facility]

[facility]



     USP       3-25                               (Same as:           l



               14:00:                               Senokot)           [facility]



                                                               

 

     Saline            No                       Notes:           [facility]



     Flush 0.9%      3-25                               preservati           l



               14:00:                               ve free.           [facility]                                                

 

     Docusate            No                       Notes:           [facility]



               3-25                               (Same as:           l



               14:00:                               Colace)           [facility]



                                                (Do Not           



                                                  Crush)           

 

     sennosides,            No                       Notes:           [facility]

[facility]



     USP       3-25                               (Same as:           l



               14:00:                               Senokot)           [facility]



                                                               

 

     Saline            No                       Notes:           [facility]



     Flush 0.9%      3-25                               preservati           l



               14:00:                               ve free.           [facility]



                                                               

 

     Docusate            No                       Notes:           [facility]



               3-25                               (Same as:           l



               14:00:                               Colace)           [facility]



                                                (Do Not           



                                                  Crush)           

 

     sennosides,            No                       Notes:           [facility]

[facility]



     USP       3-25                               (Same as:           l



               14:00:                               Senokot)           [facility]



                                                               

 

     Saline            No                       Notes:           [facility]



     Flush 0.9%      3-25                               preservati           l



               14:00:                               ve free.           [facility]



                                                               

 

     Docusate            No                       Notes:           [facility]



               3-25                               (Same as:           l



               14:00:                               Colace)           [facility]



                                                (Do Not           



                                                  Crush)           

 

     sennosides,            No                       Notes:           [facility]

[facility]



     USP       3-25                               (Same as:           l



               14:00:                               Senokot)           [facility]



                                                               

 

     Saline            No                       Notes:           [facility]



     Flush 0.9%      3-25                               preservati           l



               14:00:                               ve free.           [facility]



                                                               

 

     influenza            Yes                      Notes:           [facility]

a



     virus      3-25                               (Same as:           l



     vaccine,      06:10:                               Fluzone           [facility]

n



     inactivated      26                                 [place]           



                                                  nt,            



                                                  Fluarix           



                                                  [place]           



                                                  nt) For           



                                                  patients 6           



                                                  - 35           



                                                  months of           



                                                  age (0.5           



                                                  mL IM) For           



                                                  3 years of           



                                                  age and           



                                                  older (0.5           



                                                  mL IM)           



                                                  Shake well           



                                                  before use           

 

     influenza            Yes                      Notes:           [facility]

a



     virus      3-25                               (Same as:           l



     vaccine,      06:10:                               Fluzone           [facility]

n



     inactivated      26                                 [place]           



                                                  nt,            



                                                  Fluarix           



                                                  [place]           



                                                  nt) For           



                                                  patients 6           



                                                  - 35           



                                                  months of           



                                                  age (0.5           



                                                  mL IM) For           



                                                  3 years of           



                                                  age and           



                                                  older (0.5           



                                                  mL IM)           



                                                  Shake well           



                                                  before use           

 

     influenza            Yes                      Notes:           [facility]

a



     virus      3-25                               (Same as:           l



     vaccine,      06:10:                               Fluzone           [facility]

n



     inactivated      26                                 [place]           



                                                  nt,            



                                                  Fluarix           



                                                  [place]           



                                                  nt) For           



                                                  patients 6           



                                                  - 35           



                                                  months of           



                                                  age (0.5           



                                                  mL IM) For           



                                                  3 years of           



                                                  age and           



                                                  older (0.5           



                                                  mL IM)           



                                                  Shake well           



                                                  before use           

 

     influenza            Yes                      Notes:           [facility]

a



     virus      3-25                               (Same as:           l



     vaccine,      06:10:                               Fluzone           [facility]

n



     inactivated      26                                 [place]           



                                                  nt,            



                                                  Fluarix           



                                                  [place]           



                                                  nt) For           



                                                  patients 6           



                                                  - 35           



                                                  months of           



                                                  age (0.5           



                                                  mL IM) For           



                                                  3 years of           



                                                  age and           



                                                  older (0.5           



                                                  mL IM)           



                                                  Shake well           



                                                  before use           

 

     Lorazepam            No                       Notes:           [facility]

a



               3-25                               (Same as:           l



               06:05:                               Ativan)           [facility]



                                                               

 

     Methocarbam            No                       Notes:           [facility]

[facility]



     ol        3-25                               (Same           l



               06:05:                               as:Robaxin           [facility]



                                                )              

 

     Lorazepam            No                       Notes:           [facility]

a



               3-25                               (Same as:           l



               06:05:                               Ativan)           [facility]



                                                               

 

     Methocarbam            No                       Notes:           [facility]

[facility]



     ol        3-25                               (Same           l



               06:05:                               as:Robaxin           [facility]



                                                )              

 

     Lorazepam            No                       Notes:           [facility]

a



               3-25                               (Same as:           l



               06:05:                               Ativan)           [facility]



               00                                                

 

     Methocarbam            No                       Notes:           [facility]

[facility]



     ol        3-25                               (Same           l



               06:05:                               as:Robaxin           [facility]



                                                )              

 

     Lorazepam            No                       Notes:           [facility]

a



               3-25                               (Same as:           l



               06:05:                               Ativan)           [facility]



                                                               

 

     Methocarbam            No                       Notes:           [facility]

[facility]



     ol        3-25                               (Same           l



               06:05:                               as:Robaxin           [facility]



               00                                 )              

 

     Sodium            No                       1,000 mL,           [facility]

a



     Chloride      3-25                               Rate: 50           l



     0.9% IV      06:03:                               ml/hr,           [facility]



     1,000 mL      00                                 Infuse           



                                                  over: 20           



                                                  hr, Route:           



                                                  IV, Dosing           



                                                  Weight           



                                                  131.818           



                                                  kg, Total           



                                                  Volume:           



                                                  1,000,           



                                                  Start           



                                                  date:           



                                                  22           



                                                  1:03:00           



                                                  CDT,           



                                                  Duration:           



                                                  30 day,           



                                                  Stop date:           



                                                  22           



                                                  1:02:00           



                                                  CDT, BSA:           



                                                  2.4 m2, 0           

 

     Labetalol            No                       10 mg, 2           [facility]

[facility]



               3-25                               mL, Route:           l



               06:03:                               IVP, Drug                                            form: INJ,           



                                                  Q15Min,           



                                                  Dosing           



                                                  Weight           



                                                  131.818,           



                                                  kg, Start           



                                                  date:           



                                                  22           



                                                  1:03:00           



                                                  CDT,           



                                                  Duration:           



                                                  3 doses or           



                                                  times,           



                                                  Stop date:           



                                                  22           



                                                  1:33:00           



                                                  CDT, 0           

 

     Hydralazine            No                       Notes:           [facility]

[facility]



               3-25                               (Same as:           l



               06:03:                               Apresoline                                            ) Push           



                                                  over 5           



                                                  minutes           

 

     Ondansetron            No                       /= 4           [facility]

a



               3-25                               years,           l



               06:03:                               Pediatric                                            Dosing           

 

     Acetaminoph            No                       Notes: Do           [facility]

[facility]



     en 325 MG /      3-25                               not exceed           l



     Hydrocodone      06:03:                               4gm/day of           

[facility]



     Bitartrate      00                                 acetaminop           



     10 MG Oral                                         hen. (Same           



     Tablet                                         as: Norco           



     [Norco                                         325/10)           



     10/325]                                                        

 

     Dilaudid            No                       Notes:           [facility]



               3-25                               Same as           l



               06:03:                               Dilaudid                                                           

 

     Benadryl            No                       Notes:           [facility]



               3-25                               (Same as:           l



               06:03:                               Benadryl)                                                           

 

     phenol            No                       Notes:           [facility]



               3-25                               Chlorasept           l



               06:03:                               ic Spray                                            (Same as:           



                                                  Chlorasept           



                                                  ic, Sore           



                                                  Throat           



                                                  Spray)           



                                                  WASTE: F/P           



                                                  - Black; E           



                                                  -              



                                                  Municipal           



                                                  Trash Bin           

 

     Bisacodyl            No                       Notes:           [facility]

a



               3-25                               (Same As:           l



               06:03:                               Dulcolax,           [facility]                                 Bisco-Lax)           

 

     Robaxin            No                       Notes:           [facility]



               3-25                               (Same           l



               06:03:                               as:Robaxin                                            )              

 

     Melatonin 3            No                       Notes:           [facility]

[facility]



     MG Extended      3-25                               (Same as:           l



     Release      06:03:                               Melatonin)           [facility]

jorge



     Tablet      00                                                

 

     Tylenol            No                       Notes: Do           [facility]

ia



               3-25                               not exceed           l



               06:03:                               4 gm/day.           [facility]



               00                                 (Same as:           



                                                  Tylenol)           

 

     Reglan            No                       Notes:           [facility]



               3-25                               (Same as:           l



               06:03:                               Reglan)           [facility]



                                                               

 

     Phenergan            No                       Notes: Do           [facility]

[facility]



               3-25                               not give           l



               06:03:                               IV push.           [facility]



                                                (Same as:           



                                                  Phenergan)           

 

     Saline            No                       Notes:           [facility]



     Flush 0.9%      3-25                               preservati           l



               06:03:                               ve free.           [facility]



                                                               

 

     Sodium            No                       1,000 mL,           [facility]

a



     Chloride      3-25                               Rate: 50           l



     0.9% IV      06:03:                               ml/hr,           [facility]



     1,000 mL      00                                 Infuse           



                                                  over: 20           



                                                  hr, Route:           



                                                  IV, Dosing           



                                                  Weight           



                                                  131.818           



                                                  kg, Total           



                                                  Volume:           



                                                  1,000,           



                                                  Start           



                                                  date:           



                                                  22           



                                                  1:03:00           



                                                  CDT,           



                                                  Duration:           



                                                  30 day,           



                                                  Stop date:           



                                                  22           



                                                  1:02:00           



                                                  CDT, BSA:           



                                                  2.4 m2, 0           

 

     Labetalol            No                       10 mg, 2           [facility]

[facility]



               3-25                               mL, Route:           l



               06:03:                               IVP, Drug                                            form: INJ,           



                                                  Q15Min,           



                                                  Dosing           



                                                  Weight           



                                                  131.818,           



                                                  kg, Start           



                                                  date:           



                                                  22           



                                                  1:03:00           



                                                  CDT,           



                                                  Duration:           



                                                  3 doses or           



                                                  times,           



                                                  Stop date:           



                                                  22           



                                                  1:33:00           



                                                  CDT, 0           

 

     Hydralazine            No                       Notes:           [facility]

[facility]



               3-25                               (Same as:           l



               06:03:                               Apresoline                                            ) Push           



                                                  over 5           



                                                  minutes           

 

     Ondansetron            No                       /= 4           [facility]

a



               3-25                               years,           l



               06:03:                               Pediatric           [facility]



               00                                 Dosing           

 

     Acetaminoph            No                       Notes: Do           [facility]

[facility]



     en 325 MG /      3-25                               not exceed           l



     Hydrocodone      06:03:                               4gm/day of           

[place]



     Bitartrate      00                                 acetaminop           



     10 MG Oral                                         hen. (Same           



     Tablet                                         as: Norco           



     [Norco                                         325/10)           



     10/325]                                                        

 

     Dilaudid            No                       Notes:           [facility]



               3-25                               Same as           l



               06:03:                               Dilaudid           [facility]                                                

 

     Benadryl            No                       Notes:           [facility]



               3-25                               (Same as:           l



               06:03:                               Benadryl)           [facility]



                                                               

 

     phenol            No                       Notes:           [facility]



               3-25                               Chlorasept           l



               06:03:                               ic Spray                                            (Same as:           



                                                  Chlorasept           



                                                  ic, Sore           



                                                  Throat           



                                                  Spray)           



                                                  WASTE: F/P           



                                                  - Black; E           



                                                  -              



                                                  [facility]           



                                                  Trash Bin           

 

     Bisacodyl            No                       Notes:           [facility]

a



               3-25                               (Same As:           l



               06:03:                               Dulcolax,           [facility]                                 Bisco-Lax)           

 

     Robaxin            No                       Notes:           [facility]



               3-25                               (Same           l



               06:03:                               as:Robaxin                                            )              

 

     Melatonin 3            No                       Notes:           [facility]

[facility]



     MG Extended      3-25                               (Same as:           l



     Release      06:03:                               Melatonin)           [facility]

j[facility]



     Tablet                                                      

 

     Tylenol            No                       Notes: Do           [facility]

ia



               3-25                               not exceed           l



               06:03:                               4 gm/day.           [facility]



               00                                 (Same as:           



                                                  Tylenol)           

 

     Reglan            No                       Notes:           [facility]



               3-25                               (Same as:           l



               06:03:                               Reglan)           [facility]                                                

 

     Phenergan            No                       Notes: Do           [facility]

[facility]



               3-25                               not give           l



               06:03:                               IV push.           [facility]



                                                (Same as:           



                                                  Phenergan)           

 

     Saline            No                       Notes:           [facility]



     Flush 0.9%      3-25                               preservati           l



               06:03:                               ve free.           [facility]



                                                               

 

     Sodium            No                       1,000 mL,           [facility]

a



     Chloride      3-25                               Rate: 50           l



     0.9% IV      06:03:                               ml/hr,           [facility]



     1,000 mL      00                                 Infuse           



                                                  over: 20           



                                                  hr, Route:           



                                                  IV, Dosing           



                                                  Weight           



                                                  131.818           



                                                  kg, Total           



                                                  Volume:           



                                                  1,000,           



                                                  Start           



                                                  date:           



                                                  22           



                                                  1:03:00           



                                                  CDT,           



                                                  Duration:           



                                                  30 day,           



                                                  Stop date:           



                                                  22           



                                                  1:02:00           



                                                  CDT, BSA:           



                                                  2.4 m2, 0           

 

     Labetalol            No                       10 mg, 2           [facility]

[facility]



               3-25                               mL, Route:           l



               06:03:                               IVP, Drug                                            form: INJ,           



                                                  Q15Min,           



                                                  Dosing           



                                                  Weight           



                                                  131.818,           



                                                  kg, Start           



                                                  date:           



                                                  22           



                                                  1:03:00           



                                                  CDT,           



                                                  Duration:           



                                                  3 doses or           



                                                  times,           



                                                  Stop date:           



                                                  22           



                                                  1:33:00           



                                                  CDT, 0           

 

     Hydralazine            No                       Notes:           [facility]

[facility]



               3-25                               (Same as:           l



               06:03:                               Apresoline                                            ) Push           



                                                  over 5           



                                                  minutes           

 

     Ondansetron            No                       /= 4           [facility]

a



               3-25                               years,           l



               06:03:                               Pediatric                                            Dosing           

 

     Acetaminoph            No                       Notes: Do           [facility]

[facility]



     en 325 MG /      3-25                               not exceed           l



     Hydrocodone      06:03:                               4gm/day of           





     Bitartrate      00                                 acetaminop           



     10 MG Oral                                         hen. (Same           



     Tablet                                         as: Norco           



     [Norco                                         325/10)           



     [address]]                                                        

 

     Dilaudid            No                       Notes:           [facility]



               3-25                               Same as           l



               06:03:                               Dilaudid                                                           

 

     Benadryl            No                       Notes:           [facility]



               3-25                               (Same as:           l



               06:03:                               Benadryl)           [facility]



                                                               

 

     phenol            No                       Notes:           [facility]



               3-25                               Chlorasept           l



               06:03:                               ic Spray                                            (Same as:           



                                                  Chlorasept           



                                                  ic, Sore           



                                                  Throat           



                                                  Spray)           



                                                  WASTE: F/P           



                                                  - Black; E           



                                                  -              



                                                  Municipal           



                                                  Trash Bin           

 

     Bisacodyl            No                       Notes:           [facility]

a



               3-25                               (Same As:           l



               06:03:                               Dulcolax,                                            Bisco-Lax)           

 

     Robaxin            No                       Notes:           [facility]



               3-25                               (Same           l



               06:03:                               as:Robaxin                                            )              

 

     Melatonin 3            No                       Notes:           [facility]

[facility]



     MG Extended      3-25                               (Same as:           l



     Release      06:03:                               Melatonin)           [facility]

j[facility]



     Tablet      00                                                

 

     Tylenol            No                       Notes: Do           [facility]

ia



               3-25                               not exceed           l



               06:03:                               4 gm/day.           [facility]



                                                (Same as:           



                                                  Tylenol)           

 

     Reglan            No                       Notes:           [facility]



               3-25                               (Same as:           l



               06:03:                               Reglan)                                                           

 

     Phenergan            No                       Notes: Do           [facility]

[facility]



               3-25                               not give           l



               06:03:                               IV push.           [facility]                                 (Same as:           



                                                  Phenergan)           

 

     Saline            No                       Notes:           [facility]



     Flush 0.9%      3-25                               preservati           l



               06:03:                               ve free.           [facility]                                                

 

     Sodium            No                       1,000 mL,           [facility]

a



     Chloride      3-25                               Rate: 50           l



     0.9% IV      06:03:                               ml/hr,           [facility]



     1,000 mL      00                                 Infuse           



                                                  over: 20           



                                                  hr, Route:           



                                                  IV, Dosing           



                                                  Weight           



                                                  131.818           



                                                  kg, Total           



                                                  Volume:           



                                                  1,000,           



                                                  Start           



                                                  date:           



                                                  22           



                                                  1:03:00           



                                                  CDT,           



                                                  Duration:           



                                                  30 day,           



                                                  Stop date:           



                                                  22           



                                                  1:02:00           



                                                  CDT, BSA:           



                                                  2.4 m2, 0           

 

     Labetalol            No                       10 mg, 2           [facility]

[facility]



               3-25                               mL, Route:           l



               06:03:                               IVP, Drug                                            form: INJ,           



                                                  Q15Min,           



                                                  Dosing           



                                                  Weight           



                                                  131.818,           



                                                  kg, Start           



                                                  date:           



                                                  22           



                                                  1:03:00           



                                                  CDT,           



                                                  Duration:           



                                                  3 doses or           



                                                  times,           



                                                  Stop date:           



                                                  22           



                                                  1:33:00           



                                                  CDT, 0           

 

     Hydralazine            No                       Notes:           [facility]

[facility]



               3-25                               (Same as:           l



               06:03:                               Apresoline                                            ) Push           



                                                  over 5           



                                                  minutes           

 

     Ondansetron            No                       /= 4           [facility]

a



               3-25                               years,           l



               06:03:                               Pediatric                                            Dosing           

 

     Acetaminoph            No                       Notes: Do           [facility]

[facility]



     en 325 MG /      3-25                               not exceed           l



     Hydrocodone      06:03:                               4gm/day of           

[facility]



     Bitartrate      00                                 acetaminop           



     10 MG Oral                                         hen. (Same           



     Tablet                                         as: Norco           



     [Norco                                         325/10)           



     10/325]                                                        

 

     Dilaudid            No                       Notes:           [facility]



               3-25                               Same as           l



               06:03:                               Dilaudid                                                           

 

     Benadryl            No                       Notes:           [facility]



               3-25                               (Same as:           l



               06:03:                               Benadryl)                                                           

 

     phenol            No                       Notes:           [facility]



               3-25                               Chlorasept           l



               06:03:                               ic Spray                                            (Same as:           



                                                  Chlorasept           



                                                  ic, Sore           



                                                  Throat           



                                                  Spray)           



                                                  WASTE: F/P           



                                                  - Black; E           



                                                  -              



                                                  Municipal           



                                                  Trash Bin           

 

     Bisacodyl            No                       Notes:           [facility]

a



               3-25                               (Same As:           l



               06:03:                               Dulcolax,                                            Bisco-Lax)           

 

     Robaxin            No                       Notes:           [facility]



               3-25                               (Same           l



               06:03:                               as:Robaxin                                            )              

 

     Melatonin 3            No                       Notes:           [facility]

rajeev



     MG Extended      3-25                               (Same as:           l



     Release      06:03:                               Melatonin)           

jorge



     Tablet      00                                                

 

     Tylenol            No                       Notes: Do           [facility]

ia



               3-25                               not exceed           l



               06:03:                               4 gm/day.           [place]



                                                (Same as:           



                                                  Tylenol)           

 

     Reglan            No                       Notes:           [facility]



               3-25                               (Same as:           l



               06:03:                               Reglan)                                                           

 

     Phenergan            No                       Notes: Do           Mem

oria



               3-25                               not give           l



               06:03:                               IV push.                                            (Same as:           



                                                  Phenergan)           

 

     Saline            No                       Notes:           [facility]



     Flush 0.9%      3-25                               preservati           l



               06:03:                               ve free.                                                           

 

     butalbital-      2022- No             2{tbl}      2 tablet,         

  [facility]



     acetaminoph      3-18 03-18                          Oral,           ity of



     en-caff      03:45: 03:10                          ONCE, 1           [place]



     (ESGIC)      00   :00                           dose, On           Medical



     -40                                         Thu            [facility]



     mg tablet 2                                         3/17/22 at           



     tablet                                         2245, ASAP           

 

     NaCl 0.9%      2022- No             500mL      at 999           Un[facility]

ers



     (NS) bolus      3-18 03-18                          mL/hr, 500           it

y of



     infusion      02:30: 03:17                          mL, IV           [place]



     500 mL      00   :00                           Infusion,           Medical



                                                  ONCE, 1           [facility]



                                                  dose, On           



                                                  Thu            



                                                  3/17/22 at           



                                                  2130, STAT           

 

     diphenhydrA       No             25mg      25 mg,           Uni

vers



     MINE      3-18 03-18                          Slow IV           ity of



     (BENADRYL)      02:30: 01:46                          Push,           [place]



     injection      00   :00                           ONCE, 1           Medical



     25 mg                                         dose, On           [facility]



                                                  Thu            



                                                  3/17/22 at           



                                                  2130, STAT           

 

     magnesium      2022- No             2g        2 g, IV           Univ

ers



     sulfate in      3-18 03-18                          Piggyback,           it

y of



     water 2      02:15: 03:17                          Administer           [facility]

as



     gram/50 mL      00   :00                           over 30           Medica

l



     (4 %)                                         Minutes,           [facility]



     infusion 2                                         ONCE, 1           



     g                                            dose, On           



                                                  u            



                                                  3/17/22 at           



                                                  2115,           



                                                  Routine           

 

     HYDROmorpho       No             .5mg      0.5 mg,           Un

[facility]



     ne                                  Slow IV           ity of



     (DILAUDID)      01:30: 01:25                          Push,           [place]



     injection      00   :00                           ONCE, 1           Medical



     0.5 mg                                         dose, On           [facility]



                                                  22 at           



                                                  1930,           



                                                  Routine<br           



                                                  >Use           



                                                  approved           



                                                  by             



                                                  (Faculty):           



                                                  ADC            



                                                  PROVIDER           

 

     levoFLOXaci      2022- No        [id number] 750mg      Take 1        

   [facility]



     n 750 mg                                tablet by           ity o

f



     tablet      00:00: 05:59                          mouth           [place]



               00   :00                           daily for           Medical



                                                  4 days.           [facility]

 

     levoFLOXaci      2022- No        [id number] 750mg      Take 1        

   [facility]



     n 750 mg                                tablet by           ity o

f



     tablet      00:00: 05:59                          mouth           [place]



               00   :00                           daily for           Medical



                                                  4 days.           [facility]

 

     OXYCODONE            Yes                      Take by           Unive

rs



     HCL/ACETAMI      -25                               mouth.           ity of



     NOPHEN      19:49:                                              [place]



     (PERCOCET      27                                                Medical



     ORAL)                                                        [place]

 

     OXYCODONE            Yes                      Take by           Unive

rs



     HCL/ACETAMI      1-25                               mouth.           ity of



     NOPHEN      19:49:                                              [place]



     (PERCOCET      27                                                Medical



     ORAL)                                                        [place]

 

     OXYCODONE            Yes                      Take by           Unive

rs



     HCL/ACETAMI      -25                               mouth.           ity of



     NOPHEN      19:49:                                              [place]



     (PERCOCET      27                                                Medical



     ORAL)                                                        [place]

 

     OXYCODONE            Yes                      Take by           Unive

rs



     HCL/ACETAMI      1-25                               mouth.           ity of



     NOPHEN      19:49:                                              [place]



     (PERCOCET      27                                                Medical



     ORAL)                                                        [place]

 

     vancomycin      2022- No             125mg      125 mg,           Un

[facility]



     (VANCOCIN)                                Oral, QID,           it

y of



     capsule 125      18:00: 21:59                          25 doses,           

[place]



     mg        00   :00                           First dose           Medical



                                                  (after           [facility]



                                                  last           



                                                  modificati           



                                                  on) on 22 at           



                                                  1200, Last           



                                                  dose on           



                                                  22 at           



                                                  1200,           



                                                  ASAP<br>Fa           



                                                  culty           



                                                  member           



                                                  approving           



                                                  Non-formul           



                                                  maryanne            



                                                  medication           



                                                  :              



                                                  MEGADC<br&           



                                                  gt;Reason           



                                                  for            



                                                  non-formul           



                                                  maryanne use:           



                                                  SPECIFIC           



                                                  INDICATION           



                                                  FOR            



                                                  NONFORMULA           



                                                  RY             



                                                  PRODUCT<br           



                                                  >Reason           



                                                  for            



                                                  Anti-Infec           



                                                  tive:           



                                                  Documented           



                                                  Infection<           



                                                  br>Documen           



                                                  huma            



                                                  Infection           



                                                  Site:           



                                                  Abdominal<           



                                                  br>Duratio           



                                                  n of           



                                                  Therapy:           



                                                  14 days           

 

     levoFLOXaci            Yes            750mg      750 mg,           Un

[facility]



     n         1-25                               Oral,           ity of



     (LEVAQUIN)      15:00:                               DAILY,           [place]



     tablet 750      00                                 First dose           Med

ical



     mg                                           (after           [facility]



                                                  last           



                                                  modificati           



                                                  on) on 22 at           



                                                  0900,           



                                                  Until           



                                                  Discontinu           



                                                  ed,            



                                                  ASAP<br>Re           



                                                  ason for           



                                                  Anti-Infec           



                                                  tive:           



                                                  Empiric           



                                                  Therapy           



                                                  for            



                                                  Suspected           



                                                  Infection<           



                                                  br>Empiric           



                                                  Therapy           



                                                  Site:           



                                                  Urine<br>D           



                                                  uration of           



                                                  therapy:           



                                                  72 hours           

 

     lactobacill      2022- No        [id number] .5mg      Take 1         

  [facility]



     us        --25                          tablet by           ity of



     acidophilus      00:00: 05:59                          mouth 2           [facility]

[place]



               00   :00                           (two)           Medical



                                                  times           [facility]



                                                  daily for           



                                                  30 days.           

 

     lactobacill      2022- No        [id number] .5mg      Take 1         

  [facility]



     us        --25                          tablet by           ity of



     acidophilus      00:00: 05:59                          mouth 2           [facility]

[place]



               00   :00                           (two)           Medical



                                                  times           [facility]



                                                  daily for           



                                                  30 days.           

 

     vancomycin      2022- No        [id number] 125mg      Take 1         

  [facility]



     125 mg                                capsule by           ity of



     capsule      00:00: 05:59                          mouth 4           [place]



               00   :00                           (four)           Medical



                                                  times           [facility]



                                                  daily for           



                                                  5 days.           

 

     vancomycin      2022- No        [id number] 125mg      Take 1         

  [facility]



     125 mg      -                          capsule by           ity of



     capsule      00:00: 05:59                          mouth 4           [place]



               00   :00                           (four)           Medical



                                                  times           [facility]



                                                  daily for           



                                                  5 days.           

 

     traMADoL            Yes            50mg      50 mg,           [facility]



     (ULTRAM)      1-24                               Oral,           ity of



     tablet 50      21:26:                               Q6HPRN,           [place]



     mg        10                                 Starting           Medical



                                                  on Mon           [facility]



                                                  22 at           



                                                  1526,           



                                                  Until           



                                                  Discontinu           



                                                  ed,            



                                                  Routine,           



                                                  Pain           



                                                  (scale           



                                                  4-6)           

 

     levoFLOXaci      2022- No             250mg      250 mg,           U

[facility]



     n                                   Oral, Q24H           ity of



     (LEVAQUIN)      19:30: 23:18                          ABX, First           

[place]



     tablet 250      00   :17                           dose           Medical



     mg                                           (after           [facility]



                                                  last           



                                                  modificati           



                                                  on) on Mon           



                                                  22 at           



                                                  1330,           



                                                  Until           



                                                  Discontinu           



                                                  ed,            



                                                  ASAP<br>Re           



                                                  ason for           



                                                  Anti-Infec           



                                                  tive:           



                                                  Empiric           



                                                  Therapy           



                                                  for            



                                                  Suspected           



                                                  Infection<           



                                                  br>Empiric           



                                                  Therapy           



                                                  Site:           



                                                  Urine<br>D           



                                                  uration of           



                                                  therapy:           



                                                  72 hours           

 

     HYDROmorpho      2022- No             .5mg      0.5 mg,           Un

[facility]



     ne                                  Slow IV           ity of



     (DILAUDID)      20:45: 18:23                          Push,           [place]



     injection      00   :44                           Q6HPRN,           Medical



     0.5 mg                                         Starting           [facility]



                                                  on Sun           



                                                  22 at           



                                                  1445,           



                                                  Until Mon           



                                                  22 at           



                                                  1223,           



                                                  Routine,           



                                                  Pain           



                                                  (scale           



                                                  7-10),           



                                                  breakthrou           



                                                  gh             



                                                  pain<br>Us           



                                                  e approved           



                                                  by             



                                                  (Faculty):           



                                                  ADC            



                                                  PROVIDER           

 

     oxyCODONE-a            Yes            2{tbl}      2 tablet,          

 [facility]



     cetaminophe                                     Oral,           ity of



     n         20:39:                               Q6HPRN,           [place]



     (PERCOCET)      03                                 Starting           Medic

al



     5-325 mg                                         on Sun           [facility]



     per tablet                                         22 at           



     2 tablet                                         1439,           



                                                  Until           



                                                  Discontinu           



                                                  ed,            



                                                  Routine,           



                                                  Pain           



                                                  (scale           



                                                  7-10)           

 

     HYDROmorpho      2022- No             .5mg      0.5 mg,           Un

[facility]



     ne                                  Slow IV           ity of



     (DILAUDID)      00:00: 23:41                          Push,           [place]



     injection      00   :00                           ONCE, 1           Medical



     0.5 mg                                         dose, On           [facility]



                                                  Sat            



                                                  22 at           



                                                  1800,           



                                                  Routine<br           



                                                  >Use           



                                                  approved           



                                                  by             



                                                  (Faculty):           



                                                  ADC            



                                                  PROVIDER           

 

     ertapenem      2022- No             1000mg      1,000 mg,           

[facility]



     (INVANZ)                                IV             ity of



     1,000 mg in      15:45: 18:30                          Piggyback,          

 [place]



     NaCl 0.9%      00   :00                           Q24H ABX,           Medic

al



     (NS) 50 mL                                         First dose           [facility]

[facility]



     MINI-BAG                                         on Sat           



                                                  22 at           



                                                  0945,           



                                                  Until           



                                                  Discontinu           



                                                  ed,            



                                                  Administer           



                                                  over 30           



                                                  Minutes,           



                                                  50             



                                                  mL<br>Reas           



                                                  on for           



                                                  Anti-Infec           



                                                  tive:           



                                                  Empiric           



                                                  Therapy           



                                                  for            



                                                  Suspected           



                                                  Infection<           



                                                  br>Empiric           



                                                  Therapy           



                                                  Site:           



                                                  Urine<br>D           



                                                  uration of           



                                                  therapy:           



                                                  72             



                                                  hours<br>R           



                                                  estricted           



                                                  use            



                                                  approved           



                                                  by: ADC           



                                                  PROVIDER           

 

     lactobacill            Yes            .5mg      0.5 mg,           Uni

vers



     us                                       Oral, BID,           ity of



     acidophilus      02:00:                               First dose           

[place]



     tablet 0.5      00                                 on Fri           Medical



     mg                                           22 at           [facility]



                                                  ,           



                                                  Until           



                                                  Discontinu           



                                                  ed,            



                                                  Routine           

 

     vancomycin      2022- No             250mg      250 mg,           Un

[place]



     (VANCOCIN)                                Oral, QID,           it

y of



     capsule 250      02:00: 16:20                          First dose          

 [place]



     mg        00   :40                           (after           Medical



                                                  last           [facility]



                                                  reorder)           



                                                  on Fri           



                                                  22 at           



                                                  2000,           



                                                  Until           



                                                  Discontinu           



                                                  ed,            



                                                  ASAP&lt;br           



                                                  >Faculty           



                                                  member           



                                                  approving           



                                                  Non-formul           



                                                  maryanne            



                                                  medication           



                                                  :              



                                                  MEGADC<br>           



                                                  Reason for           



                                                  non-formul           



                                                  maryanne use:           



                                                  SPECIFIC           



                                                  INDICATION           



                                                  FOR            



                                                  NONFORMULA           



                                                  RY             



                                                  PRODUCT<br           



                                                  >Reason           



                                                  for            



                                                  Anti-Infec           



                                                  tive:           



                                                  Documented           



                                                  Infection<           



                                                  br>Documen           



                                                  huma            



                                                  Infection           



                                                  Site:           



                                                  Abdominal<           



                                                  br>Duratio           



                                                  n of           



                                                  Therapy:           



                                                  14 days           

 

     vancomycin      2022- No             250mg      250 mg,           Un

[place]



     (VANCOCIN)                                Oral,           ity of



     capsule 250      00:45: 00:34                          ONCE, 1           [place]

[place]



     mg        00   :00                           dose, On           Medical



                                                  Fri            [facility]



                                                  22 at           



                                                  1845,           



                                                  ASAP<br>Fa           



                                                  culty           



                                                  member           



                                                  approving           



                                                  Non-formul           



                                                  maryanne            



                                                  medication           



                                                  :              



                                                  MEGADC<br>           



                                                  Reason for           



                                                  non-formul           



                                                  maryanne use:           



                                                  SPECIFIC           



                                                  INDICATION           



                                                  FOR            



                                                  NONFORMULA           



                                                  RY             



                                                  PRODUCT<br           



                                                  >Reason           



                                                  for            



                                                  Anti-Infec           



                                                  tive:           



                                                  Documented           



                                                  Infection<           



                                                  br>Documen           



                                                  huma            



                                                  Infection           



                                                  Site:           



                                                  Abdominal<           



                                                  br>Duratio           



                                                  n of           



                                                  Therapy:           



                                                  14 days           

 

     lidocaine      2022- No             5mL       5 mL,           Univer

s



     1% (PF)                                Subcutaneo           ity o

f



     (XYLOCAINE)      23:45: 02:25                          us, ONCE,           

[place]



     injection 5      00   :00                           1 dose, On           Me

dical



     mL                                           Fri            [facility]



                                                  22 at           



                                                  1745,           



                                                  Routine           

 

     NaCl 0.9%            Yes            10mL      10 mL,           Univer

s



     (NS)                                     Slow IV           ity of



     injection      23:38:                               Push, PRN,           [facility]

[place]



     10 mL      41                                 Starting           Medical



                                                  on Fri           [facility]



                                                  22 at           



                                                  1738,           



                                                  Until           



                                                  Discontinu           



                                                  ed,            



                                                  Routine,           



                                                  line           



                                                  maintenanc           



                                                  e              

 

     meropenem      2022- No             1g        1 g, IV           Univ

ers



     (MERREM)                           Piggyback,           it

y of



     g in NaCl      23:15: 14:33                          Q8H ABX,           Eric

as



     0.9% (NS)      00   :35                           First dose           Medi

[facility]



     100 mL                                         (after           [facility]



     MINI-BAG                                         last           



                                                  modificati           



                                                  on) on u           



                                                  22 at           



                                                  1715,           



                                                  Until           



                                                  Discontinu           



                                                  ed,            



                                                  Administer           



                                                  over 60           



                                                  Minutes,           



                                                  100            



                                                  mL<br>Rest           



                                                  ricted use           



                                                  approved           



                                                  by: ADC           



                                                  PROVIDER<b           



                                                  r&gt;Reaso           



                                                  n for           



                                                  Anti-Infec           



                                                  tive:           



                                                  Documented           



                                                  Infection<           



                                                  br>Documen           



                                                  huma            



                                                  Infection           



                                                  Site:           



                                                  Urine<br>D           



                                                  uration of           



                                                  Therapy: 7           



                                                  days           

 

     enoxaparin            Yes            40mg      40 mg,           Unive

rs



     (LOVENOX)                                     Subcutaneo           ity 

of



     injection      23:00:                               us, DAILY,           [facility]

[place]



     40 mg      00                                 First dose           Medical



                                                  on            [facility]



                                                  22 at           



                                                  1700,           



                                                  Until           



                                                  Discontinu           



                                                  ed,            



                                                  Routine           

 

     meropenem      2022- No             1g        1 g, IV           Univ

ers



     (MERREM)                           Piggyback,           it

y of



     g in NaCl      16:00: 20:33                          Q8H ABX,           Eric

as



     0.9% (NS)      00   :04                           First dose           Medi

[facility]



     100 mL                                         (after           [facility]



     MINI-BAG                                         last           



                                                  reorder)           



                                                  on u           



                                                  22 at           



                                                  1000,           



                                                  Until           



                                                  Discontinu           



                                                  ed,            



                                                  Administer           



                                                  over 60           



                                                  Minutes,           



                                                  100            



                                                  mL<br>Rest           



                                                  ricted use           



                                                  approved           



                                                  by: ADC           



                                                  PROVIDER<b           



                                                  r>Reason           



                                                  for            



                                                  Anti-Infec           



                                                  tive:           



                                                  Documented           



                                                  Infection<           



                                                  br>Documen           



                                                  huma            



                                                  Infection           



                                                  Site:           



                                                  Urine<br>D           



                                                  uration of           



                                                  Therapy:           



                                                  Other (see           



                                                  Comments)           

 

     HYDROmorpho      2022- No             .5mg      0.5 mg,           Un

[place]



     ne                                  Slow IV           ity of



     (DILAUDID)      14:29: 20:41                          Push,           [place]



     injection      58   :17                           Q4HPRN,           Medical



     0.5 mg                                         Starting           [facility]



                                                  on u           



                                                  22 at           



                                                  0829,           



                                                  Until Sun           



                                                  22 at           



                                                  1441,           



                                                  Routine,           



                                                  Pain           



                                                  (scale           



                                                  7-10)<br>U           



                                                  se             



                                                  approved           



                                                  by             



                                                  (Faculty):           



                                                  ADC            



                                                  PROVIDER           

 

     proMETHazin            Yes            25mg      25 mg,           Univ

ers



     e                                        Oral,           ity of



     (PHENERGAN)      09:42:                               Q4HPRN,           Eric

as



     tablet 25      07                                 Starting           Medica

l



     mg                                           on Thu           [facility]



                                                  22 at           



                                                  0342,           



                                                  Until           



                                                  Discontinu           



                                                  ed,            



                                                  Routine,           



                                                  Nausea and           



                                                  Vomiting           



                                                  (N/V)           

 

     HYDROmorpho      2022- No             .5mg      0.5 mg,           Un

[place]



     ne                                  Slow IV           ity of



     (DILAUDID)      09:41: 12:04                          Push,           [place]



     injection      36   :38                           Q4HPRN,           Medical



     0.5 mg                                         Starting           [facility]



                                                  on u           



                                                  22 at           



                                                  0341,           



                                                  Until u           



                                                  22 at           



                                                  0604,           



                                                  Routine,           



                                                  Pain           



                                                  (scale           



                                                  7-10)<br>U           



                                                  se             



                                                  approved           



                                                  by             



                                                  (Faculty):           



                                                  ADC            



                                                  PROVIDER           

 

     proCHLORper            Yes            10mg      10 mg,           Univ

ers



     azine                                     Slow IV           ity of



     (COMPAZINE)      09:07:                               Push,           [place]



     injection      27                                 Q6HPRN,           Medical



     10 mg                                         Starting           [facility]



                                                  on u           



                                                  22 at           



                                                  0307,           



                                                  Until           



                                                  Discontinu           



                                                  ed,            



                                                  Routine,           



                                                  Nausea and           



                                                  Vomiting           



                                                  (N/V)           

 

     acetaminoph            Yes            650mg      650 mg,           Un

[place]



     en                                       Oral,           ity of



     (TYLENOL)      09:07:                               Q6HPRN,           [place]



     tablet 650      10                                 Starting           Medic

al



     mg                                           on Thu           [facility]



                                                  22 at           



                                                  0307,           



                                                  Until           



                                                  Discontinu           



                                                  ed,            



                                                  Routine,           



                                                  Pain           



                                                  (scale           



                                                  1-3)           

 

     meropenem      2022- No             1g        1 g, IV           Univ

ers



     (MERREM)                           Piggyback,           it

y of



     g in NaCl      07:15: 08:49                          ONCE, 1           [place]

s



     0.9% (NS)      00   :00                           dose, On           Medica

l



     100 mL                                         Thu            [facility]



     MINI-BAG                                         22 at           



                                                  0115,           



                                                  Administer           



                                                  over 60           



                                                  Minutes,           



                                                  100            



                                                  mL<br>Rest           



                                                  ricted use           



                                                  approved           



                                                  by: ADC           



                                                  PROVIDER<b           



                                                  r>Reason           



                                                  for            



                                                  Anti-Infec           



                                                  tive:           



                                                  Documented           



                                                  Infection<           



                                                  br>Documen           



                                                  huma            



                                                  Infection           



                                                  Site:           



                                                  Urine<br>D           



                                                  uration of           



                                                  Therapy:           



                                                  Other (see           



                                                  Comments)           

 

     cefdinir      -2022- No             300mg      300 mg,           Univ

ers



     (OMNICEF)                                Oral,           ity of



     capsule 300      03:30: 02:55                          ONCE, 1           [facility]

[facility]



     mg        00   :00                           dose, On           Medical



                                                  Mon            Branch



                                                  22 at           



                                                  2130,           



                                                  ASAP<br>Re           



                                                  ason for           



                                                  Anti-Infec           



                                                  tive:           



                                                  Documented           



                                                  Infection<           



                                                  br>Documen           



                                                  huma            



                                                  Infection           



                                                  Site:           



                                                  Urine<br>D           



                                                  uration of           



                                                  Therapy: 7           



                                                  days           

 

     FENTanyl PF      -2022- No             50ug      50 mcg,           Un

[facility]



     (SUBLIMAZE                                Slow IV           ity o

f



     (PF))      01:15: 03:26                          Push,           [place]



     injection      00   :00                           ONCE, 1           Medical



     50 mcg                                         dose, On           Br[facility]



                                                  Mon            



                                                  22 at           



                                                  1915, STAT           

 

     proMETHazin      2022- No             25mg      25 mg, IV           

[facility]



     e                                   Piggyback,           ity of



     (PHENERGAN)      01:15: 03:26                          ONCE, 1           [facility]

[facility]



     25 mg in      00   :00                           dose, On           Medical



     NaCl 0.9%                                         Mon            [facility]



     (NS) 50 mL                                         22 at           



     piggyback                                         1915, 50           



                                                  mL             

 

     cefdinir      -0 - No        [id number] 300mg      Take 1           U

nivers



     300 mg                                capsule by           ity of



     capsule      00:00: 05:59                          mouth           [place]



               00   :00                           every 12           Medical



                                                  (twelve)           Branch



                                                  hours for           



                                                  10 days.           

 

     cefdinir      -0 2022- No        [id number] 300mg      Take 1           U

nivers



     300 mg                                capsule by           ity of



     capsule      00:00: 00:00                          mouth           [place]



               00   :00                           every 12           Medical



                                                  (twelve)           [facility]



                                                  hours for           



                                                  10 days.           

 

     FENTanyl PF      -2022- No             50ug      50 mcg,           Un

[facility]



     (SUBLIMAZE                                Slow IV           ity o

f



     (PF))      02:00: 01:19                          Push,           [place]



     injection      00   :00                           ONCE, 1           Medical



     50 mcg                                         dose, On           [facility]



                                                  Sat            



                                                  1/15/22 at           



                                                  2000,           



                                                  Routine           

 

     methocarbam      2022- No             1000mg      1,000 mg,         

  [facility]



     oL                                  Oral,           ity of



     (ROBAXIN)      02:00: 01:19                          ONCE, 1           [place]

[place]



     tablet      00   :00                           dose, On           Medical



     1,000 mg                                         Sat            [facility]



                                                  1/15/22 at           



                                                  2000, ASAP           

 

     proMETHazin      2022- No             12.5mg      12.5 mg,          

 [facility]



     e         1-15 01-15                          IV             ity of



     (PHENERGAN)      22:46: 23:00                          Piggyback,          

 [place]



     12.5 mg in      00   :00                           ONCE, 1           Medica

l



     NaCl 0.9%                                         dose, On           [facility]



     (NS) 50 mL                                         Sat            



     piggyback                                         1/15/22 at           



                                                  1700, 50           



                                                  mL             

 

     FENTanyl PF      2022- No             50ug      50 mcg,           Un

[facility]



     (SUBLIMAZE      1-15 01-15                          Slow IV           ity o

f



     (PF))      22:45: 23:00                          Push,           [place]



     injection      00   :00                           ONCE, 1           Medical



     50 mcg                                         dose, On           [facility]



                                                  Sat            



                                                  1/15/22 at           



                                                  1645,           



                                                  Routine           

 

     methocarbam      0      Yes       [id number] 1000mg      Take 2         

  [facility]



     oL 500 mg      1-15                               tablets by           ity 

of



     tablet      00:00:                               mouth 4           [place]



               00                                 (four)           Medical



                                                  times           [facility]



                                                  daily as           



                                                  needed for           



                                                  Pain           



                                                  (scale           



                                                  1-3).           

 

     methocarbam      -0      Yes       [id number] 1000mg      Take 2         

  [facility]



     oL 500 mg      1-15                               tablets by           ity 

of



     tablet      00:00:                               mouth 4           [place]



               00                                 (four)           Medical



                                                  times           [facility]



                                                  daily as           



                                                  needed for           



                                                  Pain           



                                                  (scale           



                                                  1-3).           

 

     methocarbam      -0      Yes       [id number] 1000mg      Take 2         

  [facility]



     oL 500 mg      1-15                               tablets by           ity 

of



     tablet      00:00:                               mouth 4           [place]



               00                                 (four)           Medical



                                                  times           [facility]



                                                  daily as           



                                                  needed for           



                                                  Pain           



                                                  (scale           



                                                  1-3).           

 

     methocarbam      2022-0      Yes       [id number] 1000mg      Take 2         

  [facility]



     oL 500 mg      1-15                               tablets by           ity 

of



     tablet      00:00:                               mouth 4           [place]



               00                                 (four)           Medical



                                                  times           [facility]



                                                  daily as           



                                                  needed for           



                                                  Pain           



                                                  (scale           



                                                  1-3).           

 

     methocarbam      -0      Yes       [id number] 1000mg      Take 2         

  [facility]



     oL 500 mg      1-15                               tablets by           ity 

of



     tablet      00:00:                               mouth 4           [place]



               00                                 (four)           Medical



                                                  times           [facility]



                                                  daily as           



                                                  needed for           



                                                  Pain           



                                                  (scale           



                                                  1-3).           

 

     methocarbam      0      Yes       [id number] 1000mg      Take 2         

  [facility]



     oL 500 mg      1-15                               tablets by           ity 

of



     tablet      00:00:                               mouth 4           [place]



               00                                 (four)           Medical



                                                  times           [facility]



                                                  daily as           



                                                  needed for           



                                                  Pain           



                                                  (scale           



                                                  1-3).           

 

     methocarbam      0 - No        [id number] 1000mg      Take 2        

   [facility]



     oL 500 mg      1-15 05-11                          tablets by           ity

 of



     tablet      00:00: 00:00                          mouth 4           [place]



               00   :00                           (four)           Medical



                                                  times           [facility]



                                                  daily as           



                                                  needed for           



                                                  Pain           



                                                  (scale           



                                                  1-3).           

 

     proMETHazin      2021 No             25mg      25 mg, IV           

[facility]



     e         -24                           Piggyback,           ity of



     (PHENERGAN)      23:15: 22:20                          ONCE, 1           [facility]

[facility]



     25 mg in      00   :00                           dose, On           Medical



     NaCl 0.9%                                         Wed            [facility]



     (NS) 50 mL                                         21           



     piggyback                                         at 1715,           



                                                  50 mL           

 

     FENTanyl PF      2021 No             75ug      75 mcg,           Un

[facility]



     (SUBLIMAZE                                Slow IV           ity o

f



     (PF))      22:15: 22:20                          Push,           [place]



     injection      00   :00                           ONCE, 1           Medical



     75 mcg                                         dose, On           [facility]



                                                  Wed            



                                                  21           



                                                  at 1615,           



                                                  Routine           

 

     fidaxomicin      2021      Yes       [id number] 200mg      Take 1          

 [facility]



     200 mg      1-24                               tablet by           ity of



     tablet      00:00:                               mouth 2           [place]



               00                                 (two)           Medical



                                                  times           [facility]



                                                  daily.           

 

     proMETHazin      2021      Yes       [id number] 25mg      Take 1           

[facility]



     e 25 mg      1-24                               tablet by           ity of



     tablet      00:00:                               mouth           [place]



               00                                 every 6           Medical



                                                  (six)           [facility]



                                                  hours as           



                                                  needed for           



                                                  Nausea and           



                                                  Vomiting           



                                                  (N/V).           

 

     fidaxomicin      2021      Yes       [id number] 200mg      Take 1          

 [facility]



     200 mg      1-24                               tablet by           ity of



     tablet      00:00:                               mouth 2           [place]



               00                                 (two)           Medical



                                                  times           [facility]



                                                  daily.           

 

     proMETHazin      2021      Yes       [id number] 25mg      Take 1           

[facility]



     e 25 mg      1-24                               tablet by           ity of



     tablet      00:00:                               mouth           [place]



               00                                 every 6           Medical



                                                  (six)           [facility]



                                                  hours as           



                                                  needed for           



                                                  Nausea and           



                                                  Vomiting           



                                                  (N/V).           

 

     cholestyram      2021      Yes                                     Univer

s



     ine 4 gram      1-24                                              ity of



     packet      00:00:                                              [place]



               00                                                Medical



                                                                 [facility]

 

     fidaxomicin      2021      Yes       [id number] 200mg      Take 1          

 [facility]



     200 mg      1-24                               tablet by           ity of



     tablet      00:00:                               mouth 2           [place]



               00                                 (two)           Medical



                                                  times           Br[facility]



                                                  daily.           

 

     proMETHazin      2021      Yes       [id number] 25mg      Take 1           

[facility]



     e 25 mg      1-24                               tablet by           ity of



     tablet      00:00:                               mouth           [place]



               00                                 every 6           Medical



                                                  (six)           [facility]



                                                  hours as           



                                                  needed for           



                                                  Nausea and           



                                                  Vomiting           



                                                  (N/V).           

 

     cholestyram      2021      Yes                                     Univer

s



     ine 4 gram      1-24                                              ity of



     packet      00:00:                                              [place]



               00                                                Medical



                                                                 [facility]

 

     cholestyram      2021      Yes                                     Univer

s



     ine 4 gram      1-24                                              ity of



     packet      00:00:                                              [place]



               00                                                Medical



                                                                 [facility]

 

     cholestyram      2021      Yes                                     Univer

s



     ine 4 gram      1-24                                              ity of



     packet      00:00:                                              [place]



               00                                                Medical



                                                                 [facility]

 

     cholestyram      2021      Yes                                     Univer

s



     ine 4 gram      1-24                                              ity of



     packet      00:00:                                              [place]



               00                                                Medical



                                                                 [facility]anch

 

     cholestyram      2021      Yes                                     Univer

s



     ine 4 gram      1-24                                              ity of



     packet      00:00:                                              [place]



               00                                                Medical



                                                                 Branch

 

     fidaxomicin      2021      Yes       [id number] 200mg      Take 1          

 [facility]



     200 mg      1-24                               tablet by           ity of



     tablet      00:00:                               mouth 2           [place]



               00                                 (two)           Medical



                                                  times           Branch



                                                  daily.           

 

     proMETHazin      2021      Yes       [id number] 25mg      Take 1           

[facility]



     e 25 mg      1-24                               tablet by           ity of



     tablet      00:00:                               mouth           [place]



               00                                 every 6           Medical



                                                  (six)           Branch



                                                  hours as           



                                                  needed for           



                                                  Nausea and           



                                                  Vomiting           



                                                  (N/V).           

 

     cholestyram      2021- No                                      Unive

rs



     ine 4 gram      1-24 05-11                                         ity of



     packet      00:00: 00:00                                         [place]



               00   :00                                          Medical



                                                                 Branch

 

     fidaxomicin      2021- No        [id number] 200mg      Take 1         

  [facility]



     200 mg      1-24 01-25                          tablet by           ity of



     tablet      00:00: 00:00                          mouth 2           [place]



               00   :00                           (two)           Medical



                                                  times           Branch



                                                  daily.           

 

     proMETHazin      2021- No        [id number] 25mg      Take 1          

 [facility]



     e 25 mg                                tablet by           ity of



     tablet      00:00: 00:00                          mouth           [place]



               00   :00                           every 6           Medical



                                                  (six)           Branch



                                                  hours as           



                                                  needed for           



                                                  Nausea and           



                                                  Vomiting           



                                                  (N/V).           

 

     FENTanyl PF       No             50ug      50 mcg,           Un

[facility]



     (SUBLIMAZE      5-10 05-10                          Intravenou           it

y of



     (PF))      02:45: 01:34                          s, ONCE, 1           [place]



     injection      00   :00                           dose, Sun           Medic

al



     50 mcg                                         21 at           Branch



                                                  2145,           



                                                  Routine           

 

     cefTRIAXone       No             1000mg      1,000 mg,         

  [facility]



     (ROCEPHIN)      5-10 05-10                          IV             ity of



     1,000 mg in      02:30: 01:55                          Piggyback,          

 [place]



     NaCl 0.9%      00   :00                           ONCE, 1           Medical



     (NS) 50 mL                                         dose, [facility]



     MINI-BAG                                         21 at           



                                                  2130, 50           



                                                  mL<br>Reas           



                                                  on for           



                                                  Anti-Infec           



                                                  tive:           



                                                  Documented           



                                                  Infection<           



                                                  br>Documen           



                                                  huma            



                                                  Infection           



                                                  Site:           



                                                  Urine<br>D           



                                                  uration of           



                                                  Therapy: 7           



                                                  days           

 

     famotidine       No             20mg      20 mg,           Un[facility]

ers



     (PEPCID      5-10 05-10                          Slow IV           ity of



     (PF))      01:00: 00:12                          Push,           [place]



     injection      00   :00                           ONCE, 1           Medical



     20 mg                                         dose, [facility]



                                                  21 at           



                                                  2000, ASAP           

 

     proMETHazin      2021- No             25mg      25 mg, IV           

[facility]



     e         5-10 05-10                          Piggyback,           ity of



     (PHENERGAN)      00:45: 00:11                          ONCE, 1           [facility]

[place]



     25 mg in      00   :00                           dose, [place]           [facility]

l



     NaCl 0.9%                                         21 at           [facility]

h



     (NS) 50 mL                                         1945, 50           



     piggyback                                         mL             

 

     FENTanyl PF      2021- No             50ug      50 mcg,           Un

yoselyn



     (SUBLIMAZE      5-10 05-10                          Intravenou           it

y of



     (PF))      00:45: 00:12                          s, ONCE, 1           [place]



     injection      00   :00                           dose, Sun           Medic

al



     50 mcg                                         21 at           [facility]



                                                  1945,           



                                                  Routine           

 

     NaCl 0.9%      2021- No             1000mL      at 999           Uni

vers



     (NS) bolus      5-10 05-10                          mL/hr,           ity of



     infusion      00:00: 01:47                          1,000 mL,           [facility]

as



     1,000 mL      00   :00                           IV             Medical



                                                  Infusion,           [facility]



                                                  ONCE, 1           



                                                  dose, Sun           



                                                  21 at           



                                                  1900, ASAP           

 

     cefdinir      2021- No        [id number] 300mg      Take 1           U

nivers



     300 mg       05-20                          capsule by           ity of



     capsule      00:00: 04:59                          mouth 2           [place]



               00   :00                           (two)           Medical



                                                  times           [facility]



                                                  daily for           



                                                  10 days.           

 

     buPROPion       No             150mg QD   Take 1           [facility]



     (WELLBUTRIN      1-17 01-16                          tablet           [facility]



     XL) 150 MG      00:00: 23:59                          (150 mg           Med

ical



     24 hr      00   :00                           total) by           Center



     tablet                                         mouth           



                                                  daily.           

 

     citalopram       No             40mg QD   Take 1           [facility]



     (CELEXA) 40      1-17 -16                          tablet (40           [facility]

[facility]



     MG tablet      00:00: 23:59                          mg total)           Me

dical



               00   :00                           by mouth           Center



                                                  daily.           

 

     oxyCODONE-a            Yes            1{tbl}      Take 1           [facility]



     cetaminophe      1-16                               tablet by           Ni

es



     n         14:44:                               mouth           Medical



     (PERCOCET)      50                                 every 4           Center



      mg                                         (four)           



     per tablet                                         hours as           



                                                  needed for           



                                                  Pain.           

 

     oxyCODONE-a            Yes            1{tbl}      Take 1           [facility]



     cetaminophe      1-16                               tablet by           Ni

es



     n         14:44:                               mouth           Medical



     (PERCOCET)      50                                 every 4           Center



      mg                                         (four)           



     per tablet                                         hours as           



                                                  needed for           



                                                  Pain.           

 

     oxyCODONE-a            Yes            1{tbl}      Take 1           [facility]



     cetaminophe      1-16                               tablet by           Ni

es



     n         14:44:                               mouth           Medical



     (PERCOCET)      50                                 every 4           Center



      mg                                         (four)           



     per tablet                                         hours as           



                                                  needed for           



                                                  Pain.           

 

     oxyCODONE-a            Yes            1{tbl}      Take 1           [facility]



     cetaminophe      1-16                               tablet by           Ni

es



     n         14:44:                               mouth           Medical



     (PERCOCET)      50                                 every 4           Center



      mg                                         (four)           



     per tablet                                         hours as           



                                                  needed for           



                                                  Pain.           

 

     oxyCODONE-a      2020-0      Yes            1{tbl}      Take 1           [facility]



     cetaminophe      1-16                               tablet by           Ni

es



     n         14:44:                               mouth           Medical



     (PERCOCET)      50                                 every 4           Center



      mg                                         (four)           



     per tablet                                         hours as           



                                                  needed for           



                                                  Pain.           

 

     zinc      2020-0      Yes            5g   Q.5D Apply 5 g           [facility]



     oxide-yuan      1-16                               topically           Ni

es



     latum      00:00:                               2 (two)           Medical



     (CRITIC-AID      00                                 times           Center



     ) 20-51 %                                         daily.           



     Pste                                                        



     topical                                                        



     paste                                                        

 

     meropenem      2020-0      Yes            1g        Inject 1 g           [facility]



     (MERREM)      1-16                               intravenou           [facility]



     MBP 1 gm in      00:00:                               sly every           M

edical



     100 mL NS      00                                 8 (eight)           [facility]

r



                                                  hours.           

 

     insulin      2020-0      Yes            58U  Q.5D Inject 58           [facility]

[facility]



     glargine      1-16                               Units           [facility]



     (LANTUS)      00:00:                               subcutaneo           Med

ical



     100 unit/mL      00                                 usly 2           Center



     injection                                         (two)           



                                                  times           



                                                  daily Use           



                                                  as             



                                                  directed.           

 

     zinc      2020-0      Yes            5g   Q.5D Apply 5 g           [facility]



     oxide-yuan      1-16                               topically           Ni

es



     latum      00:00:                               2 (two)           Medical



     (CRITIC-AID      00                                 times           Center



     ) 20-51 %                                         daily.           



     Pste                                                        



     topical                                                        



     paste                                                        

 

     meropenem      2020-0      Yes            1g        Inject 1 g           [facility]



     (MERREM)      1-16                               intravenou           [facility]



     MBP 1 gm in      00:00:                               sly every           M

edical



     100 mL NS      00                                 8 (eight)           [facility]

r



                                                  hours.           

 

     insulin      2020-0      Yes            58U  Q.5D Inject 58           [facility]

[facility]



     glargine      1-16                               Units           [facility]



     (LANTUS)      00:00:                               subcutaneo           Med

ical



     100 unit/mL      00                                 usly 2           Center



     injection                                         (two)           



                                                  times           



                                                  daily Use           



                                                  as             



                                                  directed.           

 

     zinc      2020-0      Yes            5g   Q.5D Apply 5 g           [facility]



     oxide-yuan      1-16                               topically           Ni

es



     latum      00:00:                               2 (two)           Medical



     (CRITIC-AID      00                                 times           Center



     ) 20-51 %                                         daily.           



     Pste                                                        



     topical                                                        



     paste                                                        

 

     meropenem      2020-0      Yes            1g        Inject 1 g           [facility]



     (MERREM)      1-16                               intravenou           [facility]



     MBP 1 gm in      00:00:                               sly every           M

edical



     100 mL NS      00                                 8 (eight)           [facility]

r



                                                  hours.           

 

     insulin      2020-0      Yes            58U  Q.5D Inject 58           [facility]

t



     glargine      1-16                               Units           [facility]



     (LANTUS)      00:00:                               subcutaneo           Med

ical



     100 unit/mL      00                                 usly 2           Center



     injection                                         (two)           



                                                  times           



                                                  daily Use           



                                                  as             



                                                  directed.           

 

     zinc      2020-0      Yes            5g   Q.5D Apply 5 g           [facility]



     oxide-yuan      1-16                               topically           Ni

es



     latum      00:00:                               2 (two)           Medical



     (CRITIC-AID      00                                 times           Center



     ) 20-51 %                                         daily.           



     Pste                                                        



     topical                                                        



     paste                                                        

 

     meropenem      2020-0      Yes            1g        Inject 1 g           [facility]



     (MERREM)      1-16                               intravenou           [facility]



     MBP 1 gm in      00:00:                               sly every           M

edical



     100 mL NS      00                                 8 (eight)           [facility]

r



                                                  hours.           

 

     insulin      2020-0      Yes            58U  Q.5D Inject 58           [facility]

t



     glargine      1-16                               Units           [facility]



     (LANTUS)      00:00:                               subcutaneo           Med

ical



     100 unit/mL      00                                 usly 2           Center



     injection                                         (two)           



                                                  times           



                                                  daily Use           



                                                  as             



                                                  directed.           

 

     zinc      2020-0      Yes            5g   Q.5D Apply 5 g           [facility]



     oxide-yuan      1-16                               topically           Ni

es



     latum      00:00:                               2 (two)           Medical



     (CRITIC-AID      00                                 times           Center



     ) 20-51 %                                         daily.           



     Pste                                                        



     topical                                                        



     paste                                                        

 

     meropenem      2020-0      Yes            1g        Inject 1 g           [facility]



     (MERREM)      1-16                               intravenou           [facility]



     MBP 1 gm in      00:00:                               sly every           M

edical



     100 mL NS      00                                 8 (eight)           [facility]

r



                                                  hours.           

 

     insulin      2020-0      Yes            58U  Q.5D Inject 58           [facility]

t



     glargine      1-16                               Units           [facility]



     (LANTUS)      00:00:                               subcutaneo           Med

ical



     100 unit/mL      00                                 usly 2           Center



     injection                                         (two)           



                                                  times           



                                                  daily Use           



                                                  as             



                                                  directed.           

 

     insulin      2020-0 2021- No             0U        Inject           [facility]



     lispro      1-16 01-15                          0-12 Units           [facility]



     (HUMALOG)      00:00: 23:59                          subcutaneo           M

edical



     100 unit/mL      00   :00                           usly 3           Center



     injection                                         (three)           



                                                  times           



                                                  daily           



                                                  before           



                                                  meals.           

 

     insulin      2020-0 2021- No             25U       Inject 25           [facility]



     lispro      1-16 01-15                          Units           [facility]



     (HUMALOG)      00:00: 23:59                          subcutaneleyla ARAUJO edical



     100 unit/mL      00   :00                           [facility] 3           Center



     injection                                         (three)           



                                                  times           



                                                  daily           



                                                  before           



                                                  meals.           

 

     normal      2018      No                       1,000 mL,           [facility]

a



     saline 0.9%      1-08                               Rate: 100           l



     IV 1,000 mL      11:04:                               ml/hr,           [facility]

[facility]



               00                                 Infuse           



                                                  over: 10           



                                                  hr, Route:           



                                                  IV, Dosing           



                                                  Weight           



                                                  131.818           



                                                  kg, Total           



                                                  Volume:           



                                                  1,000,           



                                                  Start           



                                                  date:           



                                                  18           



                                                  5:04:00           



                                                  CST,           



                                                  Duration:           



                                                  30 day,           



                                                  Stop date:           



                                                  18           



                                                  5:03:00           



                                                  CST, 2.4,           



                                                  m2             

 

     normal      2018      No                       1,000 mL,           [facility]

a



     saline 0.9%      1-08                               Rate: 100           l



     IV 1,000 mL      11:04:                               ml/hr,           [facility]

j[facility]



               00                                 Infuse           



                                                  over: 10           



                                                  hr, Route:           



                                                  IV, Dosing           



                                                  Weight           



                                                  131.818           



                                                  kg, Total           



                                                  Volume:           



                                                  1,000,           



                                                  Start           



                                                  date:           



                                                  18           



                                                  5:04:00           



                                                  CST,           



                                                  Duration:           



                                                  30 day,           



                                                  Stop date:           



                                                  18           



                                                  5:03:00           



                                                  CST, 2.4,           



                                                  m2             

 

     normal      2018      No                       1,000 mL,           [facility]

a



     saline 0.9%      1-08                               Rate: 100           l



     IV 1,000 mL      11:04:                               ml/hr,           [facility]

jorge



               00                                 Infuse           



                                                  over: 10           



                                                  hr, Route:           



                                                  IV, Dosing           



                                                  Weight           



                                                  131.818           



                                                  kg, Total           



                                                  Volume:           



                                                  1,000,           



                                                  Start           



                                                  date:           



                                                  18           



                                                  5:04:00           



                                                  CST,           



                                                  Duration:           



                                                  30 day,           



                                                  Stop date:           



                                                  18           



                                                  5:03:00           



                                                  CST, 2.4,           



                                                  m2             

 

     normal      2018      No                       1,000 mL,           [facility]

a



     saline 0.9%      1-08                               Rate: 100           l



     IV 1,000 mL      11:04:                               ml/hr,           [facility]

j[facility]



               00                                 Infuse           



                                                  over: 10           



                                                  hr, Route:           



                                                  IV, Dosing           



                                                  Weight           



                                                  131.818           



                                                  kg, Total           



                                                  Volume:           



                                                  1,000,           



                                                  Start           



                                                  date:           



                                                  18           



                                                  5:04:00           



                                                  CST,           



                                                  Duration:           



                                                  30 day,           



                                                  Stop date:           



                                                  18           



                                                  5:03:00           



                                                  CST, 2.4,           



                                                  m2             

 

     Magnesium      2018      No                       Notes:           [facility]

a



     Sulfate                                     WASTE: F/P           l



               10:36:                               - Sink; E           [place]



               00                                 -              



                                                  Municipal           



                                                  Trash Bin           

 

     Isolyte S      2018      No                       Notes:           [facility]

a



     PH-7.4      1-08                               (Same as:           l



     (Bolus) IV      10:36:                               Isolyte S           He

rmann



               00                                 PH 7.4)           

 

     Magnesium      2018      No                       Notes:           [facility]

a



     Sulfate      -08                               WASTE: F/P           l



               10:36:                               - Sink; E           [place]



                                                -              



                                                  Municipal           



                                                  Trash Bin           

 

     Isolyte S      2018      No                       Notes:           [facility]

a



     PH-7.4      -08                               (Same as:           l



     (Bolus) IV      10:36:                               Isolyte S           He

rmann



               00                                 PH 7.4)           

 

     Magnesium      2018      No                       Notes:           [facility]

a



     Sulfate      -08                               WASTE: F/P           l



               10:36:                               - Sink; E           [facility]



                                                -              



                                                  Municipal           



                                                  Trash Bin           

 

     Isolyte S      2018      No                       Notes:           [facility]

a



     PH-7.4      -08                               (Same as:           l



     (Bolus) IV      10:36:                               Isolyte S           He

rmann



               00                                 PH 7.4)           

 

     Magnesium      2018      No                       Notes:           [facility]

a



     Sulfate      -08                               WASTE: F/P           l



               10:36:                               - Sink; E           [place]



                                                -              



                                                  Municipal           



                                                  Trash Bin           

 

     Isolyte S      2018      No                       Notes:           [facility]

a



     PH-7.4      -08                               (Same as:           l



     (Bolus) IV      10:36:                               Isolyte S           He

rmann



               00                                 PH 7.4)           

 

     Phenergan      2018      No                       12.5 mg,           [facility]

rajeev



               1-08                               0.5 mL,           l



               10:35:                               Route:           [facility]



               00                                 IVPB, Drug           



                                                  form: INJ,           



                                                  ONCE,           



                                                  Dosing           



                                                  Weight           



                                                  131.818,           



                                                  kg,            



                                                  Priority:           



                                                  STAT,           



                                                  Start           



                                                  date:           



                                                  18           



                                                  4:35:00           



                                                  CST, Stop           



                                                  date:           



                                                  18           



                                                  4:35:00           



                                                  CST            

 

     Phenergan      2018      No                       12.5 mg,           [facility]

rajeev



               1-08                               0.5 mL,           l



               10:35:                               Route:           [facility]



               00                                 IVPB, Drug           



                                                  form: INJ,           



                                                  ONCE,           



                                                  Dosing           



                                                  Weight           



                                                  131.818,           



                                                  kg,            



                                                  Priority:           



                                                  STAT,           



                                                  Start           



                                                  date:           



                                                  18           



                                                  4:35:00           



                                                  CST, Stop           



                                                  date:           



                                                  18           



                                                  4:35:00           



                                                  CST            

 

     Phenergan      -      No                       12.5 mg,           [facility]

rajeev



               1-08                               0.5 mL,           l



               10:35:                               Route:           [place]



               00                                 IVPB, Drug           



                                                  form: INJ,           



                                                  ONCE,           



                                                  Dosing           



                                                  Weight           



                                                  131.818,           



                                                  kg,            



                                                  Priority:           



                                                  STAT,           



                                                  Start           



                                                  date:           



                                                  18           



                                                  4:35:00           



                                                  CST, Stop           



                                                  date:           



                                                  18           



                                                  4:35:00           



                                                  CST            

 

     Phenergan      2018-      No                       12.5 mg,           [facility]

rajeev



               1-08                               0.5 mL,           l



               10:35:                               Route:           [place]



               00                                 IVPB, Drug           



                                                  form: INJ,           



                                                  ONCE,           



                                                  Dosing           



                                                  Weight           



                                                  131.818,           



                                                  kg,            



                                                  Priority:           



                                                  STAT,           



                                                  Start           



                                                  date:           



                                                  18           



                                                  4:35:00           



                                                  CST, Stop           



                                                  date:           



                                                  18           



                                                  4:35:00           



                                                  CST            

 

     Wellbutrin Wellbutrin 2018      No             1{table BID  Wellbutrin   

        



      MG  MG 0-04                     t_in_th       MG        

   



               00:00:                     e_morni                     



               00                       ng}                      

 

     Wellbutrin Wellbutrin 2018      No             1{table BID  Wellbutrin   

        



      MG  MG 0-04                     t_in_th       MG        

   



               00:00:                     e_morni                     



               00                       ng}                      

 

     Wellbutrin Wellbutrin -      No             1{table BID               

  



      MG  MG 0-04                     t_in_th                     



               00:00:                     e_morni                     



               00                       ng}                      

 

     Wellbutrin Wellbutrin 2018      No             1{table BID  Wellbutrin   

        



      MG  MG 0-04                     t_in_th       MG        

   



               00:00:                     e_morni                     



               00                       ng}                      

 

     Wellbutrin Wellbutrin -      No             1{table BID  Wellbutrin   

        



      MG  MG 0-04                     t_in_th       MG        

   



               00:00:                     e_morni                     



               00                       ng}                      

 

     Wellbutrin Wellbutrin -      No             1{table BID  Wellbutrin   

        



      MG  MG 0-04                     t_in_th       MG        

   



               00:00:                     e_morni                     



               00                       ng}                      

 

     Wellbutrin Wellbutrin 2018      No             1{table BID  Wellbutrin   

        



      MG  MG 0-04                     t_in_th       MG        

   



               00:00:                     e_morni                     



               00                       ng}                      

 

     Citalopram Citalopram -      Yes  [facility] [place]                1 tablet     

      Common



     Hydrobromid Hydrobromid 7-16                                              S

[facility]irit



     e    e    00:00:                                              - [facility]



               00                                                [facility]

 

     Seroquel Seroquel -      Yes  [facility] [place]                1 tablet         

  Common



               7-16                                              [facility]



               00:00:                                              - [facility]



               00                                                [facility]

 

     Docusate            Yes                      100 mg = 1           [facility]

[facility]



     Sodium 100      5-08                               cap, PO,           l



     MG Oral      14:56:                               BID, 0           [facility]



     Capsule      00                                 Refill(s)           

 

     Zosyn            Yes                      0              [facility]



               5-08                               Refill(s)           l



               14:56:                                              [facility]



               00                                                

 

     celecoxib            Yes                      200 mg = 1           Me

[facility]



     200 mg oral      5-08                               cap, PO,           l



     capsule      14:56:                               BID, 0           [facility]



               00                                 Refill(s)           

 

     ascorbic            Yes                      500 mg = 1           [facility]

[facility]



     acid      5-08                               tab, PO,           l



               14:56:                               BID, 0           [facility]



               00                                 Refill(s)           

 

     acetaminoph            Yes                      1,000 mg =           

[facility]



     en 500 mg      5-08                               2 tab, PO,           l



     oral tablet      14:56:                               Q6Hnow, 0           H

[facility]



               00                                 Refill(s)           

 

     Oxycodone            Yes                      5 mg = 1           [facility]

rajeev



     Hydrochlori      5-08                               tab, PO,           l



     de 5 MG      14:56:                               Q4H, PRN           [facility]

n



     Oral Tablet      00                                 Pain Score           



                                                  4-6, 0           



                                                  Refill(s)           

 

     zinc            Yes                      220 mg = 1           [facility]



     sulfate 220      5-08                               cap, PO,           l



     mg oral      14:56:                               Daily, 0           [facility]

n



     capsule      00                                 Refill(s)           

 

     multivitami            Yes                      1 tab, PO,           

[facility]



     n         5-08                               Daily, 0           l



               14:56:                               Refill(s)           [facility]



               00                                                

 

     methocarbam            Yes                      1,000 mg =           

[facility]



     ol 500 mg      5-08                               2 tab, PO,           l



     oral tablet      14:56:                               Q8H, 0           [facility]

jorge



               00                                 Refill(s)           

 

     LORazepam            Yes                      0.5 mg = 1           [facility]

[facility]



     0.5 mg oral      5-08                               tab, PO,           l



     tablet      14:56:                               Q8H, PRN           [place]



               00                                 Anxiety, 0           



                                                  Refill(s)           

 

     Lidocaine            Yes                      3 patch,           [facility]

rajeev



     Hydrochlori      5-08                               TOP,           l



     de 0.05      14:56:                               Daily,           [facility]



     MG/MG      00                                 Remove           



     Transdermal                                         after 12           



     Patch                                         hours, 0           



     [Lidoderm]                                         Refill(s)           

 

     Docusate            Yes                      100 mg = 1           [facility]

[facility]



     Sodium 100      5-08                               cap, PO,           l



     MG Oral      14:56:                               BID, 0           [place]



     Capsule      00                                 Refill(s)           

 

     Zosyn            Yes                      0              [facility]



               5-08                               Refill(s)           l



               14:56:                                              [place]



               00                                                

 

     celecoxib            Yes                      200 mg = 1           [facility]

[facility]



     200 mg oral      5-08                               cap, PO,           l



     capsule      14:56:                               BID, 0           [facility]



               00                                 Refill(s)           

 

     ascorbic            Yes                      500 mg = 1           [facility]

[facility]



     acid      5-08                               tab, PO,           l



               14:56:                               BID, 0           [facility]



               00                                 Refill(s)           

 

     acetaminoph            Yes                      1,000 mg =           

[facility]



     en 500 mg      5-08                               2 tab, PO,           l



     oral tablet      14:56:                               Q6Hnow, 0           H

[facility]



               00                                 Refill(s)           

 

     Oxycodone            Yes                      5 mg = 1           [facility]

rajeev



     Hydrochlori      5-08                               tab, PO,           l



     de 5 MG      14:56:                               Q4H, PRN           [facility]

n



     Oral Tablet      00                                 Pain Score           



                                                  4-6, 0           



                                                  Refill(s)           

 

     zinc            Yes                      220 mg = 1           [facility]



     sulfate 220      5-08                               cap, PO,           l



     mg oral      14:56:                               Daily, 0           [facility]

n



     capsule      00                                 Refill(s)           

 

     multivitami            Yes                      1 tab, PO,           

[facility]



     n         5-08                               Daily, 0           l



               14:56:                               Refill(s)           [place]



               00                                                

 

     methocarbam            Yes                      1,000 mg =           

[facility]



     ol 500 mg      5-08                               2 tab, PO,           l



     oral tablet      14:56:                               Q8H, 0           [facility]

jorge



               00                                 Refill(s)           

 

     LORazepam            Yes                      0.5 mg = 1           Me

[facility]ia



     0.5 mg oral      5-08                               tab, PO,           l



     tablet      14:56:                               Q8H, PRN           [facility]



               00                                 Anxiety, 0           



                                                  Refill(s)           

 

     Lidocaine            Yes                      3 patch,           [facility]

ra[facility]ev



     Hydrochlori      5-08                               TOP,           l



     de 0.05      14:56:                               Daily,           [place]



     MG/MG      00                                 Remove           



     Transdermal                                         after 12           



     Patch                                         hours, 0           



     [Lidoderm]                                         Refill(s)           

 

     Docusate            Yes                      100 mg = 1           [facility]

[facility]



     Sodium 100      5-08                               cap, PO,           l



     MG Oral      14:56:                               BID, 0           [facility]



     Capsule      00                                 Refill(s)           

 

     Zosyn            Yes                      0              [facility]



               5-08                               Refill(s)           l



               14:56:                                              [facility]



               00                                                

 

     celecoxib            Yes                      200 mg = 1           [facility]

[facility]



     200 mg oral      5-08                               cap, PO,           l



     capsule      14:56:                               BID, 0           [facility]



               00                                 Refill(s)           

 

     ascorbic            Yes                      500 mg = 1           [facility]

[facility]



     acid      5-08                               tab, PO,           l



               14:56:                               BID, 0           [facility]



               00                                 Refill(s)           

 

     acetaminoph            Yes                      1,000 mg =           

[facility]



     en 500 mg      5-08                               2 tab, PO,           l



     oral tablet      14:56:                               Q6Hnow, 0           H

[facility]



               00                                 Refill(s)           

 

     Oxycodone            Yes                      5 mg = 1           [facility]

ra[facility]ev



     Hydrochlori      5-08                               tab, PO,           l



     de 5 MG      14:56:                               Q4H, PRN           [facility]

n



     Oral Tablet      00                                 Pain Score           



                                                  4-6, 0           



                                                  Refill(s)           

 

     zinc            Yes                      220 mg = 1           [facility]



     sulfate 220      5-08                               cap, PO,           l



     mg oral      14:56:                               Daily, 0           [facility]

n



     capsule      00                                 Refill(s)           

 

     multivitami            Yes                      1 tab, PO,           

[facility]



     n         5-08                               Daily, 0           l



               14:56:                               Refill(s)           [place]



                                                               

 

     methocarbam            Yes                      1,000 mg =           

[facility]



     ol 500 mg      5-08                               2 tab, PO,           l



     oral tablet      14:56:                               Q8H, 0           [facility]

jorge



               00                                 Refill(s)           

 

     LORazepam            Yes                      0.5 mg = 1           Me

[facility]



     0.5 mg oral      5-08                               tab, PO,           l



     tablet      14:56:                               Q8H, PRN           [place]



               00                                 Anxiety, 0           



                                                  Refill(s)           

 

     Lidocaine            Yes                      3 patch,           [facility]

ra[facility]ev



     Hydrochlori      5-08                               TOP,           l



     de 0.05      14:56:                               Daily,           [facility]



     MG/MG      00                                 Remove           



     Transdermal                                         after 12           



     Patch                                         hours, 0           



     [Lidoderm]                                         Refill(s)           

 

     Docusate            Yes                      100 mg = 1           [facility]

[facility]



     Sodium 100      5-08                               cap, PO,           l



     MG Oral      14:56:                               BID, 0           [facility]



     Capsule      00                                 Refill(s)           

 

     Zosyn            Yes                      0              [facility]



               5-08                               Refill(s)           l



               14:56:                                              [facility]



               00                                                

 

     celecoxib            Yes                      200 mg = 1           [facility]

[facility]



     200 mg oral      5-08                               cap, PO,           l



     capsule      14:56:                               BID, 0           [place]



               00                                 Refill(s)           

 

     ascorbic            Yes                      500 mg = 1           [facility]

[facility]



     acid      5-08                               tab, PO,           l



               14:56:                               BID, 0           [facility]



               00                                 Refill(s)           

 

     acetaminoph            Yes                      1,000 mg =           

[facility]



     en 500 mg      5-08                               2 tab, PO,           l



     oral tablet      14:56:                               Q6Hnow, 0           [facility]

[facility]



               00                                 Refill(s)           

 

     Oxycodone            Yes                      5 mg = 1           [facility]

rajeev



     Hydrochlori      5-08                               tab, PO,           l



     de 5 MG      14:56:                               Q4H, PRN           [facility]

n



     Oral Tablet      00                                 Pain Score           



                                                  4-6, 0           



                                                  Refill(s)           

 

     zinc            Yes                      220 mg = 1           [facility]



     sulfate 220      5-08                               cap, PO,           l



     mg oral      14:56:                               Daily, 0           [facility]

n



     capsule      00                                 Refill(s)           

 

     multivitami            Yes                      1 tab, PO,           

[facility]



     n         5-08                               Daily, 0           l



               14:56:                               Refill(s)           [facility]



                                                               

 

     methocarbam            Yes                      1,000 mg =           

[facility]



     ol 500 mg      5-08                               2 tab, PO,           l



     oral tablet      14:56:                               Q8H, 0           [facility]

jorge



               00                                 Refill(s)           

 

     LORazepam            Yes                      0.5 mg = 1           [facility]

[facility]



     0.5 mg oral      5-08                               tab, PO,           l



     tablet      14:56:                               Q8H, PRN           [facility]



               00                                 Anxiety, 0           



                                                  Refill(s)           

 

     Lidocaine            Yes                      3 patch,           [facility]

rajeev



     Hydrochlori      5-08                               TOP,           l



     de 0.05      14:56:                               Daily,           [facility]



     MG/MG      00                                 Remove           



     Transdermal                                         after 12           



     Patch                                         hours, 0           



     [Lidoderm]                                         Refill(s)           

 

     Robaxin            No                       Notes:           [facility]



               5-06                               (Same           l



               21:00:                               as:Robaxin           [facility]



                                                )              

 

     Robaxin            No                       Notes:           [facility]



               5-06                               (Same           l



               21:00:                               as:Robaxin           [place]



                                                )              

 

     Robaxin            No                       Notes:           [facility]



               5-06                               (Same           l



               21:00:                               as:Robaxin           [facility]



                                                )              

 

     Robaxin            No                       Notes:           [facility]



               5-06                               (Same           l



               21:00:                               as:Robaxin           [facility]



                                                )              

 

     Oxycodone            No                       Notes:           [facility]

a



     Hydrochlori      5-06                               (Same as:           l



     de 5 MG      18:06:                               Roxicodone           [facility]

jorge



     Oral Tablet      00                                 )              

 

     Oxycodone            No                       Notes:           [facility]

a



     Hydrochlori      5-06                               (Same as:           l



     de 5 MG      18:06:                               Roxicodone           [facility]

jorge



     Oral Tablet      00                                 )              

 

     Oxycodone            No                       Notes:           [facility]

a



     Hydrochlori      5-06                               (Same as:           l



     de 5 MG      18:06:                               Roxicodone           [facility]

jogre



     Oral Tablet      00                                 )              

 

     Oxycodone            No                       Notes:           [facility]

a



     Hydrochlori      5-06                               (Same as:           l



     de 5 MG      18:06:                               Roxicodone           [facility]

jorge



     Oral Tablet      00                                 )              

 

     Ativan            No                       Notes:           [facility]



               5-06                               (Same as:           l



               15:18:                               Ativan)           [facility]



                                                               

 

     Ativan            No                       Notes:           [facility]



               5-06                               (Same as:           l



               15:18:                               Ativan)           [place]



                                                               

 

     Ativan            No                       Notes:           [facility]



               5-06                               (Same as:           l



               15:18:                               Ativan)           [place]



                                                               

 

     Ativan            No                       Notes:           [facility]



               5-06                               (Same as:           l



               15:18:                               Ativan)           [facility]



                                                               

 

     Trazodone            No                       Notes:           [facility]

a



     Hydrochlori      5-06                               (Same As:           l



     de 50 MG      02:00:                               Desyrel)           [facility]

nn



     Oral Tablet      00                                                

 

     remove            No                       Notes:           [facility]



     patch      5-06                               Remove           l



               02:00:                               patch 12           [facility]



               00                                 hours           



                                                  after           



                                                  applicatio           



                                                  n each           



                                                  day.           

 

     Trazodone            No                       Notes:           [facility]

a



     Hydrochlori      5-06                               (Same As:           l



     de 50 MG      02:00:                               Desyrel)           [facility]

nn



     Oral Tablet                                                      

 

     remove            No                       Notes:           [facility]



     patch      5-06                               Remove           l



               02:00:                               patch 12           [facility]



               00                                 hours           



                                                  after           



                                                  applicatio           



                                                  n each           



                                                  day.           

 

     Trazodone            No                       Notes:           [facility]

a



     Hydrochlori      5-06                               (Same As:           l



     de 50 MG      02:00:                               Desyrel)           [facility]

nn



     Oral Tablet                                                      

 

     remove            No                       Notes:           [facility]



     patch      5-06                               Remove           l



               02:00:                               patch 12           [facility]



               00                                 hours           



                                                  after           



                                                  applicatio           



                                                  n each           



                                                  day.           

 

     Trazodone            No                       Notes:           [facility]

a



     Hydrochlori      5-06                               (Same As:           l



     de 50 MG      02:00:                               Desyrel)           [facility]

nn



     Oral Tablet      00                                                

 

     remove            No                       Notes:           [facility]



     patch      5-06                               Remove           l



               02:00:                               patch 12           [facility]



               00                                 hours           



                                                  after           



                                                  applicatio           



                                                  n each           



                                                  day.           

 

     Oxycodone            No                       Notes:           [facility]

a



     Hydrochlori      5-05                               (Same as:           l



     de 5 MG      22:01:                               Roxicodone           [facility]

jorge



     Oral Tablet      00                                 )              

 

     Oxycodone            No                       Notes:           [facility]

a



     Hydrochlori      5-05                               (Same as:           l



     de 5 MG      22:01:                               Roxicodone           [facility]

jorge



     Oral Tablet      00                                 )              

 

     Oxycodone            No                       Notes:           [facility]

a



     Hydrochlori      5-05                               (Same as:           l



     de 5 MG      22:01:                               Roxicodone           [facility]

jorge



     Oral Tablet      00                                 )              

 

     Oxycodone            No                       Notes:           [facility]

a



     Hydrochlori      5-05                               (Same as:           l



     de 5 MG      22:01:                               Roxicodone           [facility]

jorge



     Oral Tablet      00                                 )              

 

     Celebrex            No                       Notes:           [facility]



               5-05                               NSAID.           l



               22:00:                               Please           [facility]



               00                                 check           



                                                  indication           



                                                  . Not for           



                                                  seizure.           



                                                  (Same As:           



                                                  CeleBREX)           

 

     Celebrex            No                       Notes:           [facility]



               5-05                               NSAID.           l



               22:00:                               Please           [facility]



               00                                 check           



                                                  indication           



                                                  . Not for           



                                                  seizure.           



                                                  (Same As:           



                                                  CeleBREX)           

 

     Celebrex            No                       Notes:           [facility]



               5-05                               NSAID.           l



               22:00:                               Please           [facility]



               00                                 check           



                                                  indication           



                                                  . Not for           



                                                  seizure.           



                                                  (Same As:           



                                                  CeleBREX)           

 

     Celebrex            No                       Notes:           [facility]



               5-05                               NSAID.           l



               22:00:                               Please           [facility]



               00                                 check           



                                                  indication           



                                                  . Not for           



                                                  seizure.           



                                                  (Same As:           



                                                  CeleBREX)           

 

     Vancomycin      -0      No                        2001 mg:           [facility]

[facility]



               5-05                               infuse           l



               21:00:                               over 2.5           [facility]



               00                                 hours           

 

     Vancomycin      2018-0      No                        2001 mg:           [facility]

[facility]



               5-05                               infuse           l



               21:00:                               over 2.5           [place]



               00                                 hours           

 

     Vancomycin      2018-0      No                        2001 mg:           [facility]

[facility]



               5-05                               infuse           l



               21:00:                               over 2.5           [place]



               00                                 hours           

 

     Vancomycin      -0      No                        2001 mg:           [facility]

[facility]



               5-05                               infuse           l



               21:00:                               over 2.5           [facility]



               00                                 hours           

 

     Lidocaine      -0      No                       Notes:           [facility]

a



     Hydrochlori      5-05                               Apply only           l



     de 0.05      14:00:                               once for           [facility]

n



     MG/MG      00                                 up to 12           



     Transdermal                                         hours in a           



     Patch                                         24-hour           



     [Lidoderm]                                         period (12           



                                                  hours on           



                                                  and 12           



                                                  hours           



                                                  off).           



                                                  (Same as:           



                                                  Lidoderm)           



                                                  "Remove           



                                                  old patch           



                                                  before           



                                                  applicatio           



                                                  n of new           



                                                  patch"           

 

     Lidocaine            No                       Notes:           [facility]

a



     Hydrochlori      5-05                               Apply only           l



     de 0.05      14:00:                               once for           [facility]

n



     MG/MG      00                                 up to 12           



     Transdermal                                         hours in a           



     Patch                                         24-hour           



     [Lidoderm]                                         period (12           



                                                  hours on           



                                                  and 12           



                                                  hours           



                                                  off).           



                                                  (Same as:           



                                                  Lidoderm)           



                                                  "Remove           



                                                  old patch           



                                                  before           



                                                  applicatio           



                                                  n of new           



                                                  patch"           

 

     Lidocaine            No                       Notes:           [facility]

a



     Hydrochlori      5-05                               Apply only           l



     de 0.05      14:00:                               once for           [facility]

n



     MG/MG      00                                 up to 12           



     Transdermal                                         hours in a           



     Patch                                         24-hour           



     [Lidoderm]                                         period (12           



                                                  hours on           



                                                  and 12           



                                                  hours           



                                                  off).           



                                                  (Same as:           



                                                  Lidoderm)           



                                                  "Remove           



                                                  old patch           



                                                  before           



                                                  applicatio           



                                                  n of new           



                                                  patch"           

 

     Lidocaine            No                       Notes:           [facility]

a



     Hydrochlori      5-05                               Apply only           l



     de 0.05      14:00:                               once for           [facility]

n



     MG/MG      00                                 up to 12           



     Transdermal                                         hours in a           



     Patch                                         24-hour           



     [Lidoderm]                                         period (12           



                                                  hours on           



                                                  and 12           



                                                  hours           



                                                  off).           



                                                  (Same as:           



                                                  Lidoderm)           



                                                  "Remove           



                                                  old patch           



                                                  before           



                                                  applicatio           



                                                  n of new           



                                                  patch"           

 

     Phenergan            No                       Notes: Do           [facility]

[facility]



               5-04                               not give           l



               22:40:                               IV push.           [facility]



               00                                 (Same as:           



                                                  Phenergan)           

 

     Dilaudid            No                       Notes:           [facility]



               5-04                               Same as           l



               22:40:                               Dilaudid           [facility]



               00                                                

 

     Phenergan            No                       Notes: Do           [facility]

[facility]



               5-04                               not give           l



               22:40:                               IV push.           [facility]



               00                                 (Same as:           



                                                  Phenergan)           

 

     Dilaudid            No                       Notes:           [facility]



               5-04                               Same as           l



               22:40:                               Dilaudid           [place]



               00                                                

 

     Phenergan            No                       Notes: Do           [facility]

[facility]



               5-04                               not give           l



               22:40:                               IV push.           [place]



               00                                 (Same as:           



                                                  Phenergan)           

 

     Dilaudid            No                       Notes:           [facility]



               5-04                               Same as           l



               22:40:                               Dilaudid           [facility]



               00                                                

 

     Phenergan            No                       Notes: Do           [facility]

[facility]



               5-04                               not give           l



               22:40:                               IV push.           [facility]



               00                                 (Same as:           



                                                  Phenergan)           

 

     Dilaudid            No                       Notes:           [facility]



               5-04                               Same as           l



               22:40:                               Dilaudid           [facility]                                                

 

     Tramadol            No                       Notes: Not           [facility]

[facility]



               5-04                               to exceed           l



               22:00:                               400mg/day.           [facility]



               00                                 (Same As:           



                                                  Ultram)           

 

     gabapentin            No                       Notes:           [facility]

[facility]



               5-04                               (Same as:           l



               22:00:                               Neurontin)           [facility]



                                                               

 

     Acetaminoph            No                       Notes: Max           

[facility]



     en        5-04                               acetaminop           l



               22:00:                               hen 4000           [facility]



               00                                 mg/day (4           



                                                  gm/day).           



                                                  (Same as:           



                                                  Tylenol           



                                                  Extra           



                                                  Strength)           

 

     Robaxin            No                       Notes:           [facility]



               5-04                               (Same           l



               22:00:                               as:Robaxin           [facility]                                 )              

 

     Tramadol            No                       Notes: Not           [facility]

[facility]



               5-04                               to exceed           l



               22:00:                               400mg/day.           [facility]



               00                                 (Same As:           



                                                  Ultram)           

 

     gabapentin            No                       Notes:           [facility]

[facility]



               5-04                               (Same as:           l



               22:00:                               Neurontin)           [facility]                                                

 

     Acetaminoph            No                       Notes: Max           

[facility]



     en        5-04                               acetaminop           l



               22:00:                               hen 4000           [facility]



               00                                 mg/day (4           



                                                  gm/day).           



                                                  (Same as:           



                                                  Tylenol           



                                                  Extra           



                                                  Strength)           

 

     Robaxin            No                       Notes:           [facility]



               5-04                               (Same           l



               22:00:                               as:Robaxin           [facility]                                 )              

 

     Tramadol            No                       Notes: Not           [facility]

[facility]



               5-04                               to exceed           l



               22:00:                               400mg/day.           [facility]



               00                                 (Same As:           



                                                  Ultram)           

 

     gabapentin            No                       Notes:           [facility]

[facility]



               5-04                               (Same as:           l



               22:00:                               Neurontin)           [facility]                                                

 

     Acetaminoph            No                       Notes: Max           

[facility]



     en        5-04                               acetaminop           l



               22:00:                               hen 4000           [facility]



               00                                 mg/day (4           



                                                  gm/day).           



                                                  (Same as:           



                                                  Tylenol           



                                                  Extra           



                                                  Strength)           

 

     Robaxin            No                       Notes:           [facility]



               5-04                               (Same           l



               22:00:                               as:Robaxin           [facility]                                 )              

 

     Tramadol            No                       Notes: Not           [facility]

[facility]



               5-04                               to exceed           l



               22:00:                               400mg/day.           [facility]



               00                                 (Same As:           



                                                  Ultram)           

 

     gabapentin            No                       Notes:           [facility]

[facility]



               5-04                               (Same as:           l



               22:00:                               Neurontin)           [facility]



                                                               

 

     Acetaminoph            No                       Notes: Max           

[facility]



     en        5-04                               acetaminop           l



               22:00:                               hen 4000           [facility]



               00                                 mg/day (4           



                                                  gm/day).           



                                                  (Same as:           



                                                  Tylenol           



                                                  Extra           



                                                  Strength)           

 

     Robaxin            No                       Notes:           [facility]



               5-04                               (Same           l



               22:00:                               as:Robaxin           [facility]



               00                                 )              

 

     Oxycodone            No                       Notes:           [facility]

a



     Hydrochlori      5-04                               (Same as:           l



     de 5 MG      21:33:                               Roxicodone           [facility]

[facility]



     Oral Tablet      00                                 )              

 

     Oxycodone            No                       Notes:           [facility]

a



     Hydrochlori      5-04                               (Same as:           l



     de 5 MG      21:33:                               Roxicodone           [facility]

j[facility]



     Oral Tablet      00                                 )              

 

     Oxycodone            No                       Notes:           [facility]

a



     Hydrochlori      5-04                               (Same as:           l



     de 5 MG      21:33:                               Roxicodone           [facility]

j[facility]



     Oral Tablet      00                                 )              

 

     Oxycodone            No                       Notes:           [facility]

a



     Hydrochlori      5-04                               (Same as:           l



     de 5 MG      21:33:                               Roxicodone           [facility]

j[facility]



     Oral Tablet      00                                 )              

 

     Beneprotein            No                       Notes:           [facility]

[facility]



     7 gm pkt      5-04                               (Same as:           l



               21:30:                               Beneprotei           [facility]



               00                                 n)             

 

     Beneprotein            No                       Notes:           [facility]

[facility]



     7 gm pkt      5-04                               (Same as:           l



               21:30:                               Beneprotei           [facility]



               00                                 n)             

 

     Beneprotein            No                       Notes:           [facility]

ra[facility]



     7 gm pkt      5-04                               (Same as:           l



               21:30:                               Beneprotei           [facility]



               00                                 n)             

 

     Beneprotein            No                       Notes:           [facility]

ra[facility]



     7 gm pkt      5-04                               (Same as:           l



               21:30:                               Beneprotei           [facility]



               00                                 n)             

 

     Acetaminoph            No                       1 tab, PO,           

[facility]



     en 325 MG /      5-04                               TID, 0           l



     Oxycodone      17:12:                               Refill(s)           [facility]

child



     Hydrochlori      00                                                



     de 10 MG                                                        



     Oral Tablet                                                        



     [Percocet                                                        



     10/325]                                                        

 

     Acetaminoph            No                       1 tab, PO,           

[facility]



     en 325 MG /      5-04                               TID, 0           l



     Oxycodone      17:12:                               Refill(s)           [facility]

[facility]



     Hydrochlori      00                                                



     de 10 MG                                                        



     Oral Tablet                                                        



     [Percocet                                                        



     10/325]                                                        

 

     Acetaminoph      2018-0      No                       1 tab, PO,           

[facility]



     en 325 MG /      5-04                               TID, 0           l



     Oxycodone      17:12:                               Refill(s)           [facility]

child



     Hydrochlori      00                                                



     de 10 MG                                                        



     Oral Tablet                                                        



     [Percocet                                                        



     10/325]                                                        

 

     Acetaminoph      2018-0      No                       1 tab, PO,           

[facility]



     en 325 MG /      5-04                               TID, 0           l



     Oxycodone      17:12:                               Refill(s)           [facility]

[facility]



     Hydrochlori      00                                                



     de 10 MG                                                        



     Oral Tablet                                                        



     [Percocet                                                        



     10/325]                                                        

 

     Dilaudid      2018-0      No                       0.5 mg,           [facility]

a



               5-04                               0.25 mL,           l



               16:01:                               Route:           [facility]



               00                                 IVP, Drug           



                                                  form: INJ,           



                                                  ONCE,           



                                                  Start           



                                                  date:           



                                                  18           



                                                  11:01:00           



                                                  CDT, Stop           



                                                  date:           



                                                  18           



                                                  11:01:00           



                                                  CDT            

 

     Dilaudid      2018-0      No                       0.5 mg,           [facility]

a



               5-04                               0.25 mL,           l



               16:01:                               Route:           [facility]



                                                IVP, Drug           



                                                  form: INJ,           



                                                  ONCE,           



                                                  Start           



                                                  date:           



                                                  18           



                                                  11:01:00           



                                                  CDT, Stop           



                                                  date:           



                                                  18           



                                                  11:01:00           



                                                  CDT            

 

     Dilaudid      2018-0      No                       0.5 mg,           [facility]

a



               5-04                               0.25 mL,           l



               16:01:                               Route:           [facility]



                                                IVP, Drug           



                                                  form: INJ,           



                                                  ONCE,           



                                                  Start           



                                                  date:           



                                                  18           



                                                  11:01:00           



                                                  CDT, Stop           



                                                  date:           



                                                  18           



                                                  11:01:00           



                                                  CDT            

 

     Dilaudid      2018-0      No                       0.5 mg,           [facility]

a



               5-04                               0.25 mL,           l



               16:01:                               Route:           [facility]



                                                IVP, Drug           



                                                  form: INJ,           



                                                  ONCE,           



                                                  Start           



                                                  date:           



                                                  18           



                                                  11:01:00           



                                                  CDT, Stop           



                                                  date:           



                                                  18           



                                                  11:01:00           



                                                  CDT            

 

     Phenergan      2018-0      No                       Notes:           [facility]

a



               5-04                               (Same as:           l



               15:43:                               Phenergan)           [facility]                                                

 

     Dilaudid      2018-0      No                       2 mg,           [facility]



               5-04                               Route:           l



               15:43:                               IVP, ONCE,           [facility]



               00                                 Dosing           



                                                  Weight           



                                                  127.027,           



                                                  kg,            



                                                  Priority:           



                                                  STAT,           



                                                  Start           



                                                  date:           



                                                  18           



                                                  10:43:00           



                                                  CDT, Stop           



                                                  date:           



                                                  18           



                                                  10:43:00           



                                                  CDT            

 

     Phenergan      0      No                       Notes:           [facility]

a



               5-04                               (Same as:           l



               15:43:                               Phenergan)                                                           

 

     Dilaudid            No                       2 mg,           [facility]



               5-04                               Route:           l



               15:43:                               IVP, ONCE,                                            Dosing           



                                                  Weight           



                                                  127.027,           



                                                  kg,            



                                                  Priority:           



                                                  STAT,           



                                                  Start           



                                                  date:           



                                                  18           



                                                  10:43:00           



                                                  CDT, Stop           



                                                  date:           



                                                  18           



                                                  10:43:00           



                                                  CDT            

 

     Phenergan            No                       Notes:           [facility]

a



               5-04                               (Same as:           l



               15:43:                               Phenergan)                                                           

 

     Dilaudid            No                       2 mg,           [facility]



               5-04                               Route:           l



               15:43:                               IVP, ONCE,                                            Dosing           



                                                  Weight           



                                                  127.027,           



                                                  kg,            



                                                  Priority:           



                                                  STAT,           



                                                  Start           



                                                  date:           



                                                  18           



                                                  10:43:00           



                                                  CDT, Stop           



                                                  date:           



                                                  18           



                                                  10:43:00           



                                                  CDT            

 

     Phenergan            No                       Notes:           [facility]

a



               5-04                               (Same as:           l



               15:43:                               Phenergan)                                                           

 

     Dilaudid            No                       2 mg,           [facility]



               5-04                               Route:           l



               15:43:                               IVP, ONCE,                                            Dosing           



                                                  Weight           



                                                  127.027,           



                                                  kg,            



                                                  Priority:           



                                                  STAT,           



                                                  Start           



                                                  date:           



                                                  18           



                                                  10:43:00           



                                                  CDT, Stop           



                                                  date:           



                                                  18           



                                                  10:43:00           



                                                  CDT            

 

     Docusate            No                       Notes:           [facility]



               5-04                               (Same as:           l



               14:00:                               Colace)                                            (Do Not           



                                                  Crush)           

 

     Zinc            No                       Notes:           [facility]



     Sulfate      -                               (Zinc           l



               14:00:                               sulfate                                            capsule) -           



                                                  220 mg           



                                                  Zinc           



                                                  sulfate =           



                                                  50 mg           



                                                  elemental           



                                                  zinc Same           



                                                  as Zinc           



                                                  Sulfate           

 

     ascorbic            No                       Notes:           [facility]



     acid      5-04                               (Same as:           l



               14:00:                               Vitamin C)                                                           

 

     multivitami            No                       Notes:           [facility]

[facility]



     n         -04                               (Same           l



               14:00:                               as:Thera)                                            WASTE: F/P           



                                                  - Black; E           



                                                  -              



                                                  Municipal           



                                                  Trash Bin           



                                                  Take with           



                                                  food.           

 

     Docusate            No                       Notes:           [facility]



               5-04                               (Same as:           l



               14:00:                               Colace)           [facility]



                                                (Do Not           



                                                  Crush)           

 

     Zinc            No                       Notes:           [facility]



     Sulfate      5-04                               (Zinc           l



               14:00:                               sulfate           [facility]



               00                                 capsule) -           



                                                  220 mg           



                                                  Zinc           



                                                  sulfate =           



                                                  50 mg           



                                                  elemental           



                                                  zinc Same           



                                                  as Zinc           



                                                  Sulfate           

 

     ascorbic            No                       Notes:           [facility]



     acid      5-04                               (Same as:           l



               14:00:                               Vitamin C)           [facility]



                                                               

 

     multivitami            No                       Notes:           [facility]

[facility]



     n         5-04                               (Same           l



               14:00:                               as:Thera)           [facility]                                 WASTE: F/P           



                                                  - Black; E           



                                                  -              



                                                  Municipal           



                                                  Trash Bin           



                                                  Take with           



                                                  food.           

 

     Docusate            No                       Notes:           [facility]



               5-04                               (Same as:           l



               14:00:                               Colace)           [facility]



                                                (Do Not           



                                                  Crush)           

 

     Zinc            No                       Notes:           [facility]



     Sulfate      5-04                               (Zinc           l



               14:00:                               sulfate           [facility]



               00                                 capsule) -           



                                                  220 mg           



                                                  Zinc           



                                                  sulfate =           



                                                  50 mg           



                                                  elemental           



                                                  zinc Same           



                                                  as Zinc           



                                                  Sulfate           

 

     ascorbic            No                       Notes:           [facility]



     acid      5-04                               (Same as:           l



               14:00:                               Vitamin C)           [facility]



                                                               

 

     multivitami            No                       Notes:           [facility]

[facility]



     n         5-04                               (Same           l



               14:00:                               as:Thera)           [facility]                                 WASTE: F/P           



                                                  - Black; E           



                                                  -              



                                                  Municipal           



                                                  Trash Bin           



                                                  Take with           



                                                  food.           

 

     Docusate            No                       Notes:           [facility]



               5-04                               (Same as:           l



               14:00:                               Colace)           [facility]



                                                (Do Not           



                                                  Crush)           

 

     Zinc            No                       Notes:           [facility]



     Sulfate      5-04                               (Zinc           l



               14:00:                               sulfate           [facility]



               00                                 capsule) -           



                                                  220 mg           



                                                  Zinc           



                                                  sulfate =           



                                                  50 mg           



                                                  elemental           



                                                  zinc Same           



                                                  as Zinc           



                                                  Sulfate           

 

     ascorbic            No                       Notes:           [facility]



     acid      5-04                               (Same as:           l



               14:00:                               Vitamin C)           [facility]



                                                               

 

     multivitami            No                       Notes:           [facility]

[facility]



     n         5-04                               (Same           l



               14:00:                               as:Thera)           [facility]                                 WASTE: F/P           



                                                  - Black; E           



                                                  -              



                                                  Municipal           



                                                  Trash Bin           



                                                  Take with           



                                                  food.           

 

     Naproxen            No                       Notes:           [facility]



               5-04                               (Same as:           l



               07:00:                               Naprosyn)           [facility]



               00                                 Take with           



                                                  food.           

 

     Zosyn      2018-0      No                       Notes:           [facility]



               5-04                               (Same as:           l



               07:00:                               Zosyn)           [facility]



               00                                 Dosing           



                                                  based on           



                                                  Piperacill           



                                                  in             



                                                  component           



                                                  ***            



                                                  MEDICATION           



                                                  WASTE ***           



                                                  Product           



                                                  Size: 3375           



                                                  mg Product           



                                                  Wasted:           



                                                  ___ mg           

 

     Vancomycin      2018-0      No                        2001 mg:           [facility]

[facility]



               5-04                               infuse           l



               07:00:                               over 2.5           [facility]



               00                                 hours For           



                                                  adult           



                                                  patients           



                                                  only:           



                                                  Round to           



                                                  nearest           



                                                  250 mg per           



                                                  Medical           



                                                  Staff           



                                                  approval           



                                                  ***            



                                                  MEDICATION           



                                                  WASTE ***           



                                                  Product           



                                                  Size: 1000           



                                                  mg Product           



                                                  Wasted:           



                                                  ___ mg           

 

     Enoxaparin      2018-0      No                       Notes:           [facility]

[facility]



               5-04                               (Same as:           l



               07:00:                               Lovenox)           [facility]



               00                                                

 

     Naproxen      -0      No                       Notes:           [facility]



               5-04                               (Same as:           l



               07:00:                               Naprosyn)           [facility]



               00                                 Take with           



                                                  food.           

 

     Zosyn      -0      No                       Notes:           [facility]



               5-04                               (Same as:           l



               07:00:                               Zosyn)           [facility]



               00                                 Dosing           



                                                  based on           



                                                  Piperacill           



                                                  in             



                                                  component           



                                                  ***            



                                                  MEDICATION           



                                                  WASTE ***           



                                                  Product           



                                                  Size: 3375           



                                                  mg Product           



                                                  Wasted:           



                                                  ___ mg           

 

     Vancomycin      2018-0      No                        2001 mg:           [facility]

[facility]



               5-04                               infuse           l



               07:00:                               over 2.5           [facility]



               00                                 hours For           



                                                  adult           



                                                  patients           



                                                  only:           



                                                  Round to           



                                                  nearest           



                                                  250 mg per           



                                                  Medical           



                                                  Staff           



                                                  approval           



                                                  ***            



                                                  MEDICATION           



                                                  WASTE ***           



                                                  Product           



                                                  Size: 1000           



                                                  mg Product           



                                                  Wasted:           



                                                  ___ mg           

 

     Enoxaparin      2018-0      No                       Notes:           [facility]

[facility]



               5-04                               (Same as:           l



               07:00:                               Lovenox)           [facility]



               00                                                

 

     Naproxen      2018-0      No                       Notes:           [facility]



               5-04                               (Same as:           l



               07:00:                               Naprosyn)           [facility]



               00                                 Take with           



                                                  food.           

 

     Zosyn      2018-0      No                       Notes:           [facility]



               5-04                               (Same as:           l



               07:00:                               Zosyn)           [facility]



               00                                 Dosing           



                                                  based on           



                                                  Piperacill           



                                                  in             



                                                  component           



                                                  ***            



                                                  MEDICATION           



                                                  WASTE ***           



                                                  Product           



                                                  Size: 3375           



                                                  mg Product           



                                                  Wasted:           



                                                  ___ mg           

 

     Vancomycin      2018-0      No                        2001 mg:           [facility]

[facility]



               5-04                               infuse           l



               07:00:                               over 2.5           [facility]



               00                                 hours For           



                                                  adult           



                                                  patients           



                                                  only:           



                                                  Round to           



                                                  nearest           



                                                  250 mg per           



                                                  Medical           



                                                  Staff           



                                                  approval           



                                                  ***            



                                                  MEDICATION           



                                                  WASTE ***           



                                                  Product           



                                                  Size: 1000           



                                                  mg Product           



                                                  Wasted:           



                                                  ___ mg           

 

     Enoxaparin            No                       Notes:           [facility]

[facility]



               5-04                               (Same as:           l



               07:00:                               Lovenox)                                                           

 

     Naproxen            No                       Notes:           [facility]



               5-04                               (Same as:           l



               07:00:                               Naprosyn)                                            Take with           



                                                  food.           

 

     Zosyn            No                       Notes:           [facility]



               5-04                               (Same as:           l



               07:00:                               Zosyn)                                            Dosing           



                                                  based on           



                                                  Piperacill           



                                                  in             



                                                  component           



                                                  ***            



                                                  MEDICATION           



                                                  WASTE ***           



                                                  Product           



                                                  Size: 3375           



                                                  mg Product           



                                                  Wasted:           



                                                  ___ mg           

 

     Vancomycin            No                         mg:           [facility]

[facility]



               5-04                               infuse           l



               07:00:                               over 2.5           [facility]



               00                                 hours For           



                                                  adult           



                                                  patients           



                                                  only:           



                                                  Round to           



                                                  nearest           



                                                  250 mg per           



                                                  Medical           



                                                  Staff           



                                                  approval           



                                                  ***            



                                                  MEDICATION           



                                                  WASTE ***           



                                                  Product           



                                                  Size: 1000           



                                                  mg Product           



                                                  Wasted:           



                                                  ___ mg           

 

     Enoxaparin            No                       Notes:           [facility]

[facility]



               5-04                               (Same as:           l



               07:00:                               Lovenox)                                                           

 

     Sodium            No                       1,000 mL,           [facility]

a



     Chloride                                     Rate: 125           l



     0.9% IV      06:46:                               ml/hr,           [facility]



     1,000 mL      00                                 Infuse           



                                                  over: 8           



                                                  hr, Route:           



                                                  IV, Dosing           



                                                  Weight           



                                                  127.27 kg,           



                                                  Total           



                                                  Volume:           



                                                  1,000,           



                                                  Start           



                                                  date:           



                                                  18           



                                                  1:46:00           



                                                  CDT,           



                                                  Duration:           



                                                  30 day,           



                                                  Stop date:           



                                                  18           



                                                  1:45:00           



                                                  CDT, 2.44,           



                                                  m2             

 

     Saline            No                       Notes:           [facility]



     Flush 0.9%      -                               (Same as:           l



               06:46:                               BD             [facility]                                 Posiflush)           

 

     Acetaminoph            No                       Notes: Do           [facility]

[facility]



     en        -04                               not exceed           l



               06:46:                               4 gm/day.           [facility]



                                                (Same as:           



                                                  Tylenol)           

 

     Acetaminoph            No                       Notes:           [facility]

[facility]



     en 325 MG /      5-04                               (Same as:           l



     Hydrocodone      06:46:                               Norco           [facility]

nn



     Bitartrate      00                                 325/5) Do           



     5 MG Oral                                         not exceed           



     Tablet                                         4gm/day of           



                                                  acetaminop           



                                                  hen.           

 

     Sodium            No                       1,000 mL,           [facility]

a



     Chloride      5-04                               Rate: 125           l



     0.9% IV      06:46:                               ml/hr,           [facility]



     1,000 mL      00                                 Infuse           



                                                  over: 8           



                                                  hr, Route:           



                                                  IV, Dosing           



                                                  Weight           



                                                  127.27 kg,           



                                                  Total           



                                                  Volume:           



                                                  1,000,           



                                                  Start           



                                                  date:           



                                                  18           



                                                  1:46:00           



                                                  CDT,           



                                                  Duration:           



                                                  30 day,           



                                                  Stop date:           



                                                  18           



                                                  1:45:00           



                                                  CDT, 2.44,           



                                                  m2             

 

     Saline            No                       Notes:           [facility]



     Flush 0.9%      5-04                               (Same as:           l



               06:46:                               BD             [facility]



               00                                 Posiflush)           

 

     Acetaminoph            No                       Notes: Do           [facility]

[facility]



     en        5-04                               not exceed           l



               06:46:                               4 gm/day.           [facility]



               00                                 (Same as:           



                                                  Tylenol)           

 

     Acetaminoph            No                       Notes:           [facility]

[facility]



     en 325 MG /      5-04                               (Same as:           l



     Hydrocodone      06:46:                               Norco           [facility]

nn



     Bitartrate      00                                 325/5) Do           



     5 MG Oral                                         not exceed           



     Tablet                                         4gm/day of           



                                                  acetaminop           



                                                  hen.           

 

     Sodium            No                       1,000 mL,           [facility]

a



     Chloride      5-04                               Rate: 125           l



     0.9% IV      06:46:                               ml/hr,           [facility]



     1,000 mL      00                                 Infuse           



                                                  over: 8           



                                                  hr, Route:           



                                                  IV, Dosing           



                                                  Weight           



                                                  127.27 kg,           



                                                  Total           



                                                  Volume:           



                                                  1,000,           



                                                  Start           



                                                  date:           



                                                  18           



                                                  1:46:00           



                                                  CDT,           



                                                  Duration:           



                                                  30 day,           



                                                  Stop date:           



                                                  18           



                                                  1:45:00           



                                                  CDT, 2.44,           



                                                  m2             

 

     Saline            No                       Notes:           [facility]



     Flush 0.9%      5-04                               (Same as:           l



               06:46:                               BD             [facility]



               00                                 Posiflush)           

 

     Acetaminoph            No                       Notes: Do           [facility]

[facility]



     en        5-04                               not exceed           l



               06:46:                               4 gm/day.           [facility]



               00                                 (Same as:           



                                                  Tylenol)           

 

     Acetaminoph            No                       Notes:           [facility]

[facility]



     en 325 MG /      5-04                               (Same as:           l



     Hydrocodone      06:46:                               Norco           [facility]

nn



     Bitartrate      00                                 325/5) Do           



     5 MG Oral                                         not exceed           



     Tablet                                         4gm/day of           



                                                  acetaminop           



                                                  hen.           

 

     Sodium            No                       1,000 mL,           [facility]

a



     Chloride      5-04                               Rate: 125           l



     0.9% IV      06:46:                               ml/hr,           [facility]



     1,000 mL      00                                 Infuse           



                                                  over: 8           



                                                  hr, Route:           



                                                  IV, Dosing           



                                                  Weight           



                                                  127.27 kg,           



                                                  Total           



                                                  Volume:           



                                                  1,000,           



                                                  Start           



                                                  date:           



                                                  18           



                                                  1:46:00           



                                                  CDT,           



                                                  Duration:           



                                                  30 day,           



                                                  Stop date:           



                                                  18           



                                                  1:45:00           



                                                  CDT, 2.44,           



                                                  m2             

 

     Saline            No                       Notes:           [facility]



     Flush 0.9%      5-04                               (Same as:           l



               06:46:                               BD             [facility]



                                                Posiflush)           

 

     Acetaminoph            No                       Notes: Do           [facility]

[facility]



     en        5-04                               not exceed           l



               06:46:                               4 gm/day.           [facility]



                                                (Same as:           



                                                  Tylenol)           

 

     Acetaminoph            No                       Notes:           [facility]

[facility]



     en 325 MG /      -                               (Same as:           l



     Hydrocodone      06:46:                               Norco           [facility]

nn



     Bitartrate      00                                 325/5) Do           



     5 MG Oral                                         not exceed           



     Tablet                                         4gm/day of           



                                                  acetaminop           



                                                  hen.           

 

     Reglan            No                       Notes:           [facility]



               5-04                               (Same as:           l



               04:27:                               Reglan)           [facility]



                                                               

 

     Benadryl            No                       Notes:           [facility]



               5-04                               (Same as:           l



               04:27:                               Benadryl)           [facility]



                                                               

 

     Reglan            No                       Notes:           [facility]



               5-04                               (Same as:           l



               04:27:                               Reglan)           [facility]



                                                               

 

     Benadryl            No                       Notes:           [facility]



               5-04                               (Same as:           l



               04:27:                               Benadryl)           [facility]



                                                               

 

     Reglan            No                       Notes:           [facility]



               5-04                               (Same as:           l



               04:27:                               Reglan)           [facility]



                                                               

 

     Benadryl            No                       Notes:           [facility]



               5-04                               (Same as:           l



               04:27:                               Benadryl)           [facility]



                                                               

 

     Reglan            No                       Notes:           [facility]



               5-04                               (Same as:           l



               04:27:                               Reglan)           [facility]



                                                               

 

     Benadryl            No                       Notes:           [facility]



               5-04                               (Same as:           l



               04:27:                               Benadryl)           [facility]



                                                               

 

     Magnesium            No                       Notes:           [facility]

a



     Sulfate      -                               WASTE: F/P           l



               04:26:                               - Sink; E           [facility]



               00                                 -              



                                                  Municipal           



                                                  Trash Bin           

 

     Sodium            No                       1,000 mL,           [facility]

a



     Chloride      5-04                               1000           l



     0.9%      04:26:                               ml/hr,           [facility]



     (Bolus) IV      00                                 Infuse           



                                                  Over: 1           



                                                  hr, Route:           



                                                  IV, 1,000,           



                                                  Drug form:           



                                                  INJ, ONCE,           



                                                  Priority:           



                                                  STAT,           



                                                  Dosing           



                                                  Weight           



                                                  127.273           



                                                  kg, Start           



                                                  date:           



                                                  18           



                                                  23:26:00           



                                                  CDT, Stop           



                                                  date:           



                                                  18           



                                                  23:26:00           



                                                  CDT            

 

     Magnesium      2018-0      No                       Notes:           [facility]

a



     Sulfate      5-04                               WASTE: F/P           l



               04:26:                               - Sink; E           [facility]



                                                -              



                                                  Municipal           



                                                  Trash Bin           

 

     Sodium      20180      No                       1,000 mL,           [facility]

a



     Chloride      5-04                               1000           l



     0.9%      04:26:                               ml/hr,           [facility]



     (Bolus) IV      00                                 Infuse           



                                                  Over: 1           



                                                  hr, Route:           



                                                  IV, 1,000,           



                                                  Drug form:           



                                                  INJ, ONCE,           



                                                  Priority:           



                                                  STAT,           



                                                  Dosing           



                                                  Weight           



                                                  127.273           



                                                  kg, Start           



                                                  date:           



                                                  18           



                                                  23:26:00           



                                                  CDT, Stop           



                                                  date:           



                                                  18           



                                                  23:26:00           



                                                  CDT            

 

     Magnesium      2018-0      No                       Notes:           [facility]

a



     Sulfate      5-04                               WASTE: F/P           l



               04:26:                               - Sink; E           [facility]



                                                -              



                                                  Municipal           



                                                  Trash Bin           

 

     Sodium      20180      No                       1,000 mL,           [facility]

a



     Chloride      5-04                               1000           l



     0.9%      04:26:                               ml/hr,           [facility]



     (Bolus) IV      00                                 Infuse           



                                                  Over: 1           



                                                  hr, Route:           



                                                  IV, 1,000,           



                                                  Drug form:           



                                                  INJ, ONCE,           



                                                  Priority:           



                                                  STAT,           



                                                  Dosing           



                                                  Weight           



                                                  127.273           



                                                  kg, Start           



                                                  date:           



                                                  18           



                                                  23:26:00           



                                                  CDT, Stop           



                                                  date:           



                                                  18           



                                                  23:26:00           



                                                  CDT            

 

     Magnesium      2018-0      No                       Notes:           [facility]

a



     Sulfate      5-04                               WASTE: F/P           l



               04:26:                               - Sink; E           [facility]



                                                -              



                                                  Municipal           



                                                  Trash Bin           

 

     Sodium      20180      No                       1,000 mL,           [facility]

a



     Chloride      5-04                               1000           l



     0.9%      04:26:                               ml/hr,           [facility]



     (Bolus) IV      00                                 Infuse           



                                                  Over: 1           



                                                  hr, Route:           



                                                  IV, 1,000,           



                                                  Drug form:           



                                                  INJ, ONCE,           



                                                  Priority:           



                                                  STAT,           



                                                  Dosing           



                                                  Weight           



                                                  127.273           



                                                  kg, Start           



                                                  date:           



                                                  18           



                                                  23:26:00           



                                                  CDT, Stop           



                                                  date:           



                                                  18           



                                                  23:26:00           



                                                  CDT            

 

     Zosyn      2018-0      No                       4.5 gm,           [facility]



               5-04                               Route:           l



               04:10:                               IVPB,           [facility]



               00                                 ONCE,           



                                                  Dosing           



                                                  Weight           



                                                  127.273,           



                                                  kg,            



                                                  Priority:           



                                                  STAT,           



                                                  Start           



                                                  date:           



                                                  18           



                                                  23:10:00           



                                                  CDT, Stop           



                                                  date:           



                                                  18           



                                                  23:10:00           



                                                  CDT, ABX           



                                                  Indication           



                                                  :              



                                                  Bacteremia           

 

     Vancomycin      2018-0      No                         mg:           [facility]

[facility]



               5-04                               infuse           l



               04:10:                               over 2.5           [facility]



               00                                 hours For           



                                                  adult           



                                                  patients           



                                                  only:           



                                                  Round to           



                                                  nearest           



                                                  250 mg per           



                                                  Medical           



                                                  Staff           



                                                  approval           



                                                  ***            



                                                  MEDICATION           



                                                  WASTE ***           



                                                  Product           



                                                  Size: 1000           



                                                  mg Product           



                                                  Wasted:           



                                                  ___ mg           

 

     Zosyn      2018-0      No                       4.5 gm,           [facility]



               5-04                               Route:           l



               04:10:                               IVPB,           [facility]



               00                                 ONCE,           



                                                  Dosing           



                                                  Weight           



                                                  127.273,           



                                                  kg,            



                                                  Priority:           



                                                  STAT,           



                                                  Start           



                                                  date:           



                                                  18           



                                                  23:10:00           



                                                  CDT, Stop           



                                                  date:           



                                                  18           



                                                  23:10:00           



                                                  CDT, ABX           



                                                  Indication           



                                                  :              



                                                  Bacteremia           

 

     Vancomycin      2018-0      No                         mg:           [facility]

[facility]



               5-04                               infuse           l



               04:10:                               over 2.5           [facility]



               00                                 hours For           



                                                  adult           



                                                  patients           



                                                  only:           



                                                  Round to           



                                                  nearest           



                                                  250 mg per           



                                                  Medical           



                                                  Staff           



                                                  approval           



                                                  ***            



                                                  MEDICATION           



                                                  WASTE ***           



                                                  Product           



                                                  Size: 1000           



                                                  mg Product           



                                                  Wasted:           



                                                  ___ mg           

 

     Zosyn      2018-0      No                       4.5 gm,           [facility]



               5-04                               Route:           l



               04:10:                               IVPB,           [facility]



               00                                 ONCE,           



                                                  Dosing           



                                                  Weight           



                                                  127.273,           



                                                  kg,            



                                                  Priority:           



                                                  STAT,           



                                                  Start           



                                                  date:           



                                                  18           



                                                  23:10:00           



                                                  CDT, Stop           



                                                  date:           



                                                  18           



                                                  23:10:00           



                                                  CDT, ABX           



                                                  Indication           



                                                  :              



                                                  Bacteremia           

 

     Vancomycin      2018-0      No                         mg:           [facility]

[facility]



               5-04                               infuse           l



               04:10:                               over 2.5           [facility]



               00                                 hours For           



                                                  adult           



                                                  patients           



                                                  only:           



                                                  Round to           



                                                  nearest           



                                                  250 mg per           



                                                  Medical           



                                                  Staff           



                                                  approval           



                                                  ***            



                                                  MEDICATION           



                                                  WASTE ***           



                                                  Product           



                                                  Size: 1000           



                                                  mg Product           



                                                  Wasted:           



                                                  ___ mg           

 

     Zosyn      2018-0      No                       4.5 gm,           [facility]



               5-04                               Route:           l



               04:10:                               IVPB,           [facility]



               00                                 ONCE,           



                                                  Dosing           



                                                  Weight           



                                                  127.273,           



                                                  kg,            



                                                  Priority:           



                                                  STAT,           



                                                  Start           



                                                  date:           



                                                  18           



                                                  23:10:00           



                                                  CDT, Stop           



                                                  date:           



                                                  18           



                                                  23:10:00           



                                                  CDT, ABX           



                                                  Indication           



                                                  :              



                                                  Bacteremia           

 

     Vancomycin      2018-0      No                         mg:           [facility]

[facility]



               5-04                               infuse           l



               04:10:                               over 2.5           [facility]



               00                                 hours For           



                                                  adult           



                                                  patients           



                                                  only:           



                                                  Round to           



                                                  nearest           



                                                  250 mg per           



                                                  Medical           



                                                  Staff           



                                                  approval           



                                                  ***            



                                                  MEDICATION           



                                                  WASTE ***           



                                                  Product           



                                                  Size: 1000           



                                                  mg Product           



                                                  Wasted:           



                                                  ___ mg           

 

     normal      2018-0      No                       1,000 mL,           [facility]

a



     saline 0.9%      5-04                               Rate: 75           l



     IV 1,000 mL      03:39:                               ml/hr,           [facility]

[facility]



               00                                 Infuse           



                                                  over: 13.3           



                                                  hr, Route:           



                                                  IV, Dosing           



                                                  Weight           



                                                  127.273           



                                                  kg, Total           



                                                  Volume:           



                                                  1,000,           



                                                  Start           



                                                  date:           



                                                  18           



                                                  22:39:00           



                                                  CDT,           



                                                  Duration:           



                                                  30 day,           



                                                  Stop date:           



                                                  18           



                                                  22:38:00           



                                                  CDT, 2.36,           



                                                  m2             

 

     normal      2018-0      No                       1,000 mL,           [facility]

a



     saline 0.9%      5-04                               Rate: 75           l



     IV 1,000 mL      03:39:                               ml/hr,           [facility]

[facility]



               00                                 Infuse           



                                                  over: 13.3           



                                                  hr, Route:           



                                                  IV, Dosing           



                                                  Weight           



                                                  127.273           



                                                  kg, Total           



                                                  Volume:           



                                                  1,000,           



                                                  Start           



                                                  date:           



                                                  18           



                                                  22:39:00           



                                                  CDT,           



                                                  Duration:           



                                                  30 day,           



                                                  Stop date:           



                                                  18           



                                                  22:38:00           



                                                  CDT, 2.36,           



                                                  m2             

 

     normal      2018-0      No                       1,000 mL,           [facility]

a



     saline 0.9%      5-04                               Rate: 75           l



     IV 1,000 mL      03:39:                               ml/hr,           [facility]

[facility]



               00                                 Infuse           



                                                  over: 13.3           



                                                  hr, Route:           



                                                  IV, Dosing           



                                                  Weight           



                                                  127.273           



                                                  kg, Total           



                                                  Volume:           



                                                  1,000,           



                                                  Start           



                                                  date:           



                                                  18           



                                                  22:39:00           



                                                  CDT,           



                                                  Duration:           



                                                  30 day,           



                                                  Stop date:           



                                                  18           



                                                  22:38:00           



                                                  CDT, 2.36,           



                                                  m2             

 

     normal      2018-0      No                       1,000 mL,           [facility]

a



     saline 0.9%      5-04                               Rate: 75           l



     IV 1,000 mL      03:39:                               ml/hr,           [facility]

[facility]



               00                                 Infuse           



                                                  over: 13.3           



                                                  hr, Route:           



                                                  IV, Dosing           



                                                  Weight           



                                                  127.273           



                                                  kg, Total           



                                                  Volume:           



                                                  1,000,           



                                                  Start           



                                                  date:           



                                                  18           



                                                  22:39:00           



                                                  CDT,           



                                                  Duration:           



                                                  30 day,           



                                                  Stop date:           



                                                  18           



                                                  22:38:00           



                                                  CDT, 2.36,           



                                                  m2             

 

     Acetaminoph            No                       Notes: Max           

[facility]



     en        5-04                               acetaminop           l



               02:56:                               hen 4000           [facility]



               00                                 mg/day (4           



                                                  gm/day).           



                                                  (Same as:           



                                                  Tylenol           



                                                  Extra           



                                                  Strength)           

 

     Acetaminoph            No                       Notes: Max           

[facility]



     en        5-04                               acetaminop           l



               02:56:                               hen 4000           [facility]



               00                                 mg/day (4           



                                                  gm/day).           



                                                  (Same as:           



                                                  Tylenol           



                                                  Extra           



                                                  Strength)           

 

     Acetaminoph            No                       Notes: Max           

[facility]



     en        5-04                               acetaminop           l



               02:56:                               hen 4000           [facility]



               00                                 mg/day (4           



                                                  gm/day).           



                                                  (Same as:           



                                                  Tylenol           



                                                  Extra           



                                                  Strength)           

 

     Acetaminoph            No                       Notes: Max           

[facility]



     en        5-04                               acetaminop           l



               02:56:                               hen 4000           [facility]



               00                                 mg/day (4           



                                                  gm/day).           



                                                  (Same as:           



                                                  Tylenol           



                                                  Extra           



                                                  Strength)           

 

     Rocephin            No                       Notes:           [facility]



               -                               (Same As:           l



               02:21:                               Rocephin).           [facility]



               00                                 ***            



                                                  MEDICATION           



                                                  WASTE ***           



                                                  Product           



                                                  Size: 1000           



                                                  mg Product           



                                                  Wasted:           



                                                  _0__ mg           

 

     Rocephin      -0      No                       Notes:           [facility]



               5-                               (Same As:           l



               02:21:                               Rocephin).           [facility]



               00                                 ***            



                                                  MEDICATION           



                                                  WASTE ***           



                                                  Product           



                                                  Size: 1000           



                                                  mg Product           



                                                  Wasted:           



                                                  _0__ mg           

 

     Rocephin      -0      No                       Notes:           [facility]



               -                               (Same As:           l



               02:21:                               Rocephin).           [facility]



               00                                 ***            



                                                  MEDICATION           



                                                  WASTE ***           



                                                  Product           



                                                  Size: 1000           



                                                  mg Product           



                                                  Wasted:           



                                                  _0__ mg           

 

     Rocephin      -0      No                       Notes:           [facility]



               5-04                               (Same As:           l



               02:21:                               Rocephin).           [facility]



               00                                 ***            



                                                  MEDICATION           



                                                  WASTE ***           



                                                  Product           



                                                  Size:           



                                                  1000 mg           



                                                  Product           



                                                  Wasted:           



                                                  _0__ mg           

 

     Morphine      -0      No                       4 mg,           [facility]



                                              Route:           l



               00:05:                               IVP, ONCE,                                            Dosing           



                                                  Weight           



                                                  127.273,           



                                                  kg,            



                                                  Priority:           



                                                  STAT,           



                                                  Start           



                                                  date:           



                                                  18           



                                                  19:05:00           



                                                  CDT, Stop           



                                                  date:           



                                                  18           



                                                  19:05:00           



                                                  CDT            

 

     Morphine      2018-0      No                       4 mg,           [facility]



               5-04                               Route:           l



               00:05:                               IVP, ONCE,                                            Dosing           



                                                  Weight           



                                                  127.273,           



                                                  kg,            



                                                  Priority:           



                                                  STAT,           



                                                  Start           



                                                  date:           



                                                  18           



                                                  19:05:00           



                                                  CDT, Stop           



                                                  date:           



                                                  18           



                                                  19:05:00           



                                                  CDT            

 

     Morphine      2018-0      No                       4 mg,           [facility]



               5-04                               Route:           l



               00:05:                               IVP, ONCE,                                            Dosing           



                                                  Weight           



                                                  127.273,           



                                                  kg,            



                                                  Priority:           



                                                  STAT,           



                                                  Start           



                                                  date:           



                                                  18           



                                                  19:05:00           



                                                  CDT, Stop           



                                                  date:           



                                                  18           



                                                  19:05:00           



                                                  CDT            

 

     Morphine      2018-0      No                       4 mg,           [facility]



               5-04                               Route:           l



               00:05:                               IVP, ONCE,                                            Dosing           



                                                  Weight           



                                                  127.273,           



                                                  kg,            



                                                  Priority:           



                                                  STAT,           



                                                  Start           



                                                  date:           



                                                  18           



                                                  19:05:00           



                                                  CDT, Stop           



                                                  date:           



                                                  18           



                                                  19:05:00           



                                                  CDT            

 

     Benadryl            No                       Notes:           [facility]



               5-03                               (Same as:           l



               23:14:                               Benadryl)                                                           

 

     Benadryl            No                       Notes:           [facility]



               5-03                               (Same as:           l



               23:14:                               Benadryl)                                                           

 

     Benadryl            No                       Notes:           [facility]



               5-03                               (Same as:           l



               23:14:                               Benadryl)                                                           

 

     Benadryl            No                       Notes:           [facility]



               5-03                               (Same as:           l



               23:14:                               Benadryl)                                                           

 

     Benadryl            No                       Notes:           [facility]



               5-03                               (Same as:           l



               22:56:                               Benadryl)                                                           

 

     Morphine      2018-0      No                       4 mg, 1           [facility]

a



               5-03                               mL, Route:           l



               22:56:                               IVP, Drug                                            form:           



                                                  SOLN,           



                                                  ONCE,           



                                                  Dosing           



                                                  Weight           



                                                  127.273,           



                                                  kg,            



                                                  Priority:           



                                                  STAT,           



                                                  Start           



                                                  date:           



                                                  18           



                                                  17:56:00           



                                                  CDT, Stop           



                                                  date:           



                                                  18           



                                                  17:56:00           



                                                  CDT            

 

     Benadryl      0      No                       Notes:           [facility]



               5-03                               (Same as:           l



               22:56:                               Benadryl)                                                           

 

     Morphine      2018-0      No                       4 mg, 1           [facility]

a



               5-03                               mL, Route:           l



               22:56:                               IVP, Drug           [facility]                                 form:           



                                                  SOLN,           



                                                  ONCE,           



                                                  Dosing           



                                                  Weight           



                                                  127.273,           



                                                  kg,            



                                                  Priority:           



                                                  STAT,           



                                                  Start           



                                                  date:           



                                                  18           



                                                  17:56:00           



                                                  CDT, Stop           



                                                  date:           



                                                  18           



                                                  17:56:00           



                                                  CDT            

 

     Benadryl      2018-0      No                       Notes:           [facility]



               5-03                               (Same as:           l



               22:56:                               Benadryl)           [place]                                                

 

     Morphine      2018-0      No                       4 mg, 1           [facility]

a



               5-03                               mL, Route:           l



               22:56:                               IVP, Drug           [facility]                                 form:           



                                                  SOLN,           



                                                  ONCE,           



                                                  Dosing           



                                                  Weight           



                                                  127.273,           



                                                  kg,            



                                                  Priority:           



                                                  STAT,           



                                                  Start           



                                                  date:           



                                                  18           



                                                  17:56:00           



                                                  CDT, Stop           



                                                  date:           



                                                  18           



                                                  17:56:00           



                                                  CDT            

 

     Benadryl      2018-0      No                       Notes:           [facility]



               5-03                               (Same as:           l



               22:56:                               Benadryl)           [place]                                                

 

     Morphine      -0      No                       4 mg, 1           [facility]

a



               5-03                               mL, Route:           l



               22:56:                               IVP, Drug           [facility]                                 form:           



                                                  SOLN,           



                                                  ONCE,           



                                                  Dosing           



                                                  Weight           



                                                  127.273,           



                                                  kg,            



                                                  Priority:           



                                                  STAT,           



                                                  Start           



                                                  date:           



                                                  18           



                                                  17:56:00           



                                                  CDT, Stop           



                                                  date:           



                                                  18           



                                                  17:56:00           



                                                  CDT            

 

     OXYCODONE      -      Yes                      Take by           [facility]

[facility]



     HCL/ACETAMI      2-20                               mouth.           [facility] of



     NOPHEN      10:03:                                              [place]



     (PERCOCET      17                                                Medical



     [facility])                                                        [facility]

 

     OXYCODONE      2017      Yes                      Take by           [facility]

rs



     HCL/ACETAMI      2-20                               mouth.           [facility] of



     NOPHEN      04:03:                                              [place]



     (PERCOCET      17                                                Medical



     [facility])                                                        [facility]

 

     OXYCODONE      -      Yes                      Take by           [facility]

rs



     HCL/ACETAMI      2-20                               mouth.           [facility] of



     NOPHEN      04:03:                                              [place]



     (PERCOCET      17                                                Medical



     [facility])                                                        [facility]

 

     OXYCODONE      -      Yes                      Take by           [facility]

rs



     HCL/ACETAMI      2-20                               mouth.           [facility] of



     NOPHEN      04:03:                                              [place]



     (PERCOCET      17                                                Medical



     [facility])                                                        [facility]

 

     OXYCODONE      2017      Yes                      Take by           [facility]

rs



     HCL/ACETAMI      2-20                               mouth.           [facility] of



     NOPHEN      04:03:                                              [place]



     (PERCOCET      17                                                Medical



     [facility]AL)                                                        Branch

 

     dicyclomine      -      Yes            20mg      Take 1           [facility]

ers



     (BENTYL) 20      2-20                               tablet by           [facility]

 of



     mg tablet      00:00:                               mouth 4           [place]



               00                                 (four)           Medical



                                                  times           [facility]



                                                  daily.           

 

     proMETHazin      2017      Yes            25mg      Take 1           [facility]

ers



     e 25 mg      2-20                               tablet by           [facility]



     tablet      00:00:                               mouth           [place]



               00                                 every 6           [facility]



                                                  (six)           Branch



                                                  hours as           



                                                  needed for           



                                                  Nausea and           



                                                  Vomiting           



                                                  (N/V).           

 

     proMETHazin      2017      Yes            25mg      Take 1           [facility]

ers



     e 25 mg      2-20                               tablet by           [facility] of



     tablet      00:00:                               mouth           [place]



               00                                 every 6           [facility]



                                                  (six)           Branch



                                                  hours as           



                                                  needed for           



                                                  Nausea and           



                                                  Vomiting           



                                                  (N/V).           

 

     proMETHazin      2017      Yes            25mg      Take 1           [facility]

ers



     e 25 mg      2-20                               tablet by           [facility] of



     tablet      00:00:                               mouth           [place]



               00                                 every 6           [facility]



                                                  (six)           [facility]anch



                                                  hours as           



                                                  needed for           



                                                  Nausea and           



                                                  Vomiting           



                                                  (N/V).           

 

     dicyclomine      2017      Yes            20mg      Take 1           [facility]

ers



     (BENTYL) 20      2-20                               tablet by           [facility]

 of



     mg tablet      00:00:                               mouth 4           [place]



               00                                 (four)           Medical



                                                  times           [facility]



                                                  daily.           

 

     proMETHazin      2017      Yes            25mg      Take 1           [facility]

ers



     e 25 mg      2-20                               tablet by           [facility] of



     tablet      00:00:                               mouth           [place]



               00                                 every 6           Medical



                                                  (six)           Branch



                                                  hours as           



                                                  needed for           



                                                  Nausea and           



                                                  Vomiting           



                                                  (N/V).           

 

     dicyclomine      2017      Yes            20mg      Take 1           [facility]

ers



     (BENTYL) 20      2-20                               tablet by           ity

 of



     mg tablet      00:00:                               mouth 4           [place]



               00                                 (four)           Medical



                                                  times           [facility]



                                                  daily.           

 

     proMETHazin      2017      Yes            25mg      Take 1           [facility]

ers



     e 25 mg      2-20                               tablet by           [facility] of



     tablet      00:00:                               mouth           [place]



               00                                 every 6           Medical



                                                  (six)           Branch



                                                  hours as           



                                                  needed for           



                                                  Nausea and           



                                                  Vomiting           



                                                  (N/V).           

 

     proMETHazin      2017- No             25mg      Take 1           [facility]

[facility]



     e 25 mg      2-20 01-25                          tablet by           [facility] of



     tablet      00:00: 00:00                          mouth           [place]



               00   :00                           every 6           Medical



                                                  (six)           Branch



                                                  hours as           



                                                  needed for           



                                                  Nausea and           



                                                  Vomiting           



                                                  (N/V).           

 

     dicyclomine      -2022- No             20mg      Take 1           [facility]

[facility]



     (BENTYL) 20      2-20 01-15                          tablet by           it

y of



     mg tablet      00:00: 00:00                          mouth 4           [place]

[facility]



               00   :00                           (four)           [facility]



                                                  times           [facility]



                                                  daily.           

 

     proMETHazin      2017-0      Yes            25mg      Take 1           [facility]

ers



     e 25 mg      1-04                               tablet by           [facility]



     tablet      00:00:                               mouth           [place]



               00                                 every 6           Medical



                                                  (six)           Branch



                                                  hours as           



                                                  needed for           



                                                  Nausea and           



                                                  Vomiting           



                                                  (N/V) for           



                                                  up to 12           



                                                  doses.           

 

     proMETHazin      2017-0      Yes            25mg      Take 1           [facility]

ers



     e 25 mg      1-04                               tablet by           [facility] of



     tablet      00:00:                               mouth           [place]



               00                                 every 6           Medical



                                                  (six)           [facility]



                                                  hours as           



                                                  needed for           



                                                  Nausea and           



                                                  Vomiting           



                                                  (N/V) for           



                                                  up to 12           



                                                  doses.           

 

     proMETHazin      2017-0      Yes            25mg      Take 1           [facility]

ers



     e 25 mg      1-04                               tablet by           [facility] of



     tablet      00:00:                               mouth           [place]



               00                                 every 6           Medical



                                                  (six)           [facility]anch



                                                  hours as           



                                                  needed for           



                                                  Nausea and           



                                                  Vomiting           



                                                  (N/V) for           



                                                  up to 12           



                                                  doses.           

 

     proMETHazin      2017-0      Yes            25mg      Take 1           [facility]

ers



     e 25 mg      1-04                               tablet by           [facility] of



     tablet      00:00:                               mouth           [place]



               00                                 every 6           Medical



                                                  (six)           [facility]



                                                  hours as           



                                                  needed for           



                                                  Nausea and           



                                                  Vomiting           



                                                  (N/V) for           



                                                  up to 12           



                                                  doses.           

 

     proMETHazin      2017-0      Yes            25mg      Take 1           [facility]

ers



     e 25 mg      1-04                               tablet by           [facility] of



     tablet      00:00:                               mouth           [place]



               00                                 every 6           Medical



                                                  (six)           [facility]



                                                  hours as           



                                                  needed for           



                                                  Nausea and           



                                                  Vomiting           



                                                  (N/V) for           



                                                  up to 12           



                                                  doses.           

 

     proMETHazin      2017-0      Yes            25mg      Take 1           [facility]

ers



     e 25 mg      1-04                               tablet by           [facility] of



     tablet      00:00:                               mouth           [place]



               00                                 every 6           [facility]



                                                  (six)           [facility]



                                                  hours as           



                                                  needed for           



                                                  Nausea and           



                                                  Vomiting           



                                                  (N/V) for           



                                                  up to 12           



                                                  doses.           

 

     proMETHazin      2017-0      Yes            25mg      Take 1           [facility]

ers



     e 25 mg      1-04                               tablet by           [facility] of



     tablet      00:00:                               mouth           [place]



               00                                 every 6           [facility]



                                                  (six)           Branch



                                                  hours as           



                                                  needed for           



                                                  Nausea and           



                                                  Vomiting           



                                                  (N/V) for           



                                                  up to 12           



                                                  doses.           

 

     proMETHazin      2017-0      Yes            25mg      Take 1           [facility]

ers



     e 25 mg      1-04                               tablet by           [facility] of



     tablet      00:00:                               mouth           [place]



               00                                 every 6           Medical



                                                  (six)           Branch



                                                  hours as           



                                                  needed for           



                                                  Nausea and           



                                                  Vomiting           



                                                  (N/V) for           



                                                  up to 12           



                                                  doses.           

 

     proMETHazin      2017-0      Yes            25mg      Take 1           [facility]

ers



     e 25 mg      1-04                               tablet by           [facility] of



     tablet      00:00:                               mouth           [place]



               00                                 every 6           Medical



                                                  (six)           Branch



                                                  hours as           



                                                  needed for           



                                                  Nausea and           



                                                  Vomiting           



                                                  (N/V) for           



                                                  up to 12           



                                                  doses.           

 

     proMETHazin      2022- No             25mg      Take 1           Uni

vers



     e 25 mg                                tablet by           ity of



     tablet      00:00: 00:00                          mouth           [place]



               00   :00                           every 6           Medical



                                                  (six)           Branch



                                                  hours as           



                                                  needed for           



                                                  Nausea and           



                                                  Vomiting           



                                                  (N/V) for           



                                                  up to 12           



                                                  doses.           

 

     SEROquel 25 SEROquel 25           No             1{table      SEROquel     

      



     MG   MG                            t}        25 MG           

 

     SEROquel 25 SEROquel 25           No             1{table      SEROquel     

      



     MG   MG                            t}        25 MG           

 

     Macrobid Macrobid           No             1{capsu BID  Macrobid           



     100  MG                          le_with      100 MG           



                                        _food}                     

 

     Pantoprazol Pantoprazol           No             1{table QD   Pantoprazo   

        



     e Sodium 40 e Sodium 40                          t}        le Sodium       

    



     MG   MG                                      40 MG           

 

     Tylenol Tylenol           No             1{table QID  Tylenol           



     with with                          t_as_ne      with           



     Codeine #4 Codeine #4                          eded}      Codeine #4       

    



     300-60 -60 MG                                    300-60 MG           

 

     Collagenase Collagenase           No             1{appli QD   Collagenas   

        



     250 UNIT/ UNIT/GM                          cation_      e 250        

   



                                        to_affe      UNIT/GM           



                                        cted_ar                     



                                        ea}                      

 

     SEROquel 25 SEROquel 25           No             1{table      SEROquel     

      



     MG   MG                            t}        25 MG           

 

     Macrobid Macrobid           No             1{capsu BID  Macrobid           



     100  MG                          le_with      100 MG           



                                        _food}                     

 

     Tylenol Tylenol           Yes  [facility] [place]                1 tablet           [facility]

mmon



     with with                                    as needed           [facility]



     Codeine #4 Codeine #4                                                   - C

HI



                                                                 [facility]

 

     Macrobid Macrobid           Yes  [facility] [place]                1 capsule          

 Common



                                                  with food           [facility]



                                                                 - [facility]



                                                                 [facility]

 

     Pantoprazol Pantoprazol           Yes  [facility] [place]                1 tablet     

      Common



     e Sodium e Sodium                                                   [facility]



                                                                  [facility]



                                                                 [facility]

 

     Collagenase Collagenase           Yes  [facility] [place]                1            

  Common



                                                  applicatio           [facility]



                                                  n to           - [facility]



                                                  affected           [facility]

 

     Pantoprazol Pantoprazol           No             1{table QD   Pantoprazo   

        



     e Sodium 40 e Sodium 40                          t}        le Sodium       

    



     MG   MG                                      40 MG           

 

     Tylenol Tylenol           No             1{table QID  Tylenol           



     with with                          t_as_ne      with           



     Codeine #4 Codeine #4                          eded}      Codeine #4       

    



     300-60 -60 MG                                    300-60 MG           

 

     Collagenase Collagenase           No             1{appli QD   [facility]   

        



     250 UNIT/ UNIT/GM                          cation_      e 250        

   



                                        to_affe      UNIT/GM           



                                        cted_ar                     



                                        ea}                      

 

     Tylenol Tylenol           No             1{table QID  Tylenol           



     with with                          t_as_ne      with           



     Codeine #4 Codeine #4                          eded}      Codeine #4       

    



     300-60 -60 MG                                    300-60 MG           

 

     Pantoprazol Pantoprazol           No             1{table QD   Pantoprazo   

        



     e Sodium 40 e Sodium 40                          t}        le Sodium       

    



     MG   MG                                      40 MG           

 

     SEROquel 25 SEROquel 25           No             1{table      SEROquel     

      



     MG   MG                            t}        25 MG           

 

     Collagenase Collagenase           No             1{appli QD   [facility]   

        



     250 UNIT/ UNIT/GM                          cation_      e 250        

   



                                        to_affe      UNIT/GM           



                                        cted_ar                     



                                        ea}                      

 

     Macrobid Macrobid           No             1{capsu BID  Macrobid           



     100  MG                          le_with      100 MG           



                                        _food}                     

 

     Tylenol Tylenol           No             1{table QID                 



     with with                          t_as_ne                     



     Codeine #4 Codeine #4                          eded}                     



     300-60 -60 MG                                                   

 

     Pantoprazol Pantoprazol           No             1{table QD                

  



     e Sodium 40 e Sodium 40                          t}                       



     MG   MG                                                     

 

     SEROquel 25 SEROquel 25           No             1{table                   

  



     MG   MG                            t}                       

 

     Collagenase Collagenase           No             1{appli QD                

  



     250 UNIT/ UNIT/GM                          cation_                   

  



                                        to_affe                     



                                        cted_ar                     



                                        ea}                      

 

     Macrobid Macrobid           No             1{capsu BID                 



     100  MG                          le_with                     



                                        _food}                     

 

     SEROquel 25 SEROquel 25           No             1{table      SEROquel     

      



     MG   MG                            t}        25 MG           

 

     Macrobid Macrobid           No             1{capsu BID  Macrobid           



     100  MG                          le_with      100 MG           



                                        _food}                     

 

     Collagenase Collagenase           No             1{appli QD   [facility]   

        



     250 UNIT/ UNIT/GM                          cation_      e 250        

   



                                        to_affe      UNIT/GM           



                                        cted_ar                     



                                        ea}                      

 

     Pantoprazol Pantoprazol           No             1{table QD   Pantoprazo   

        



     e Sodium 40 e Sodium 40                          t}        le Sodium       

    



     MG   MG                                      40 MG           

 

     Tylenol Tylenol           No             1{table QID  Tylenol           



     with with                          t_as_ne      with           



     Codeine #4 Codeine #4                          eded}      Codeine #4       

    



     300-60 -60 MG                                    300-60 MG           

 

     Tylenol Tylenol           No             1{table QID  Tylenol           



     with with                          t_as_ne      with           



     Codeine #4 Codeine #4                          eded}      Codeine #4       

    



     300-60 -60 MG                                    300-60 MG           

 

     Collagenase Collagenase           No             1{appli QD   [facility]   

        



     250 UNIT/ UNIT/GM                          cation_      e 250        

   



                                        to_affe      UNIT/GM           



                                        cted_ar                     



                                        ea}                      

 

     Pantoprazol Pantoprazol           No             1{table QD   Pantoprazo   

        



     e Sodium 40 e Sodium 40                          t}        le Sodium       

    



     MG   MG                                      40 MG           

 

     Macrobid Macrobid           No             1{capsu BID  Macrobid           



     100  MG                          le_with      100 MG           



                                        _food}                     

 

     SEROquel 25 SEROquel 25           No             1{table      SEROquel     

      



     MG   MG                            t}        25 MG           

 

     Tylenol Tylenol           No             1{table QID  Tylenol           



     with with                          t_as_ne      with           



     Codeine #4 Codeine #4                          eded}      Codeine #4       

    



     300-60 -60 MG                                    300-60 MG           

 

     Collagenase Collagenase           No             1{appli QD   [facility]   

        



     250 UNIT/ UNIT/GM                          cation_      e 250        

   



                                        to_affe      UNIT/GM           



                                        cted_ar                     



                                        ea}                      

 

     Pantoprazol Pantoprazol           No             1{table QD   Pantoprazo   

        



     e Sodium 40 e Sodium 40                          t}        le Sodium       

    



     MG   MG                                      40 MG           

 

     Macrobid Macrobid           No             1{capsu BID  Macrobid           



     100  MG                          le_with      100 MG           



                                        _food}                     







Immunizations







           Ordered    Filled Immunization Date       Status     Comments   Sourc

e



           Immunization Name Name                                        

 

           SARS-COV-2 COVID-19            2021 Completed             [facility]

[facility] of



           MODERNA, 6MO-5YRS,            00:00:00                         [place] 

[facility]



           0.25ML VACCINE                                             Branch

 

           SARS-COV-2 COVID-19            2021 Completed             [facility]

rsity of



           MODERNA, 6MO-5YRS,            00:00:00                         [place] 

[facility]



           0.25ML VACCINE                                             Branch

 

           SARS-COV-2 COVID-19            2021 Completed             [facility]

rsity of



           MODERNA, 6MO-5YRS,            00:00:00                         [place] 

[facility]



           0.25ML VACCINE                                             Branch

 

           SARS-COV-2 COVID-19            2021 Completed             [facility]

rsity of



           MODERNA, 6MO-5YRS,            00:00:00                         [place] 

[facility]



           0.25ML VACCINE                                             Branch

 

           SARS-COV-2 COVID-19            2021 Completed             Un[facility]

rsity of



           MODERNA, 6MO-5YRS,            00:00:00                         [place] 

[facility]



           0.25ML VACCINE                                             Branch

 

           SARS-COV-2 COVID-19            2021 Completed             [facility]

rsity of



           MODERNA, 6MO-5YRS,            00:00:00                         [place] 

[facility]



           0.25ML VACCINE                                             Branch

 

           SARS-COV-2 COVID-19            2021 Completed             [facility]

rsity of



           MODERNA, 6MO-5YRS,            00:00:00                         [place] 

[facility]



           0.25ML VACCINE                                             Branch

 

           SARS-COV-2 COVID-19            2021 Completed             Unive

rsity of



           MODERNA, 6MO-5YRS,            00:00:00                         [place] 

[facility]



           0.25ML VACCINE                                             Branch

 

           SARS-COV-2 COVID-19            2021 Completed             Unive

rsity of



           MODERNA, 6MO-5YRS,            00:00:00                         [place] 

[facility]



           0.25ML VACCINE                                             Branch

 

           SARS-COV-2 COVID-19            2021 Completed             Unive

rsity of



           MODERNA, 6MO-5YRS,            00:00:00                         [place] 

[facility]



           0.25ML VACCINE                                             Branch

 

           SARS-COV-2 COVID-19            2020 Completed             Un[facility]

rsity of



           MODERNA 12+ YRS            00:00:00                         [place] [facility]

ical



           VACCINE                                                Branch

 

           SARS-COV-2 COVID-19            2020 Completed             Unive

rsity of



           MODERNA 12+ YRS            00:00:00                         [place] [facility]

ical



           VACCINE                                                Branch

 

           SARS-COV-2 COVID-19            2020 Completed             Un[facility]

rsity of



           MODERNA 12+ YRS            00:00:00                         [place] [facility]

ical



           VACCINE                                                Branch

 

           SARS-COV-2 COVID-19            2020 Completed             Un[facility]

rsity of



           MODERNA 12+ YRS            00:00:00                         [place] [facility]

ical



           VACCINE                                                Branch

 

           SARS-COV-2 COVID-19            2020 Completed             Un[facility]

rsity of



           MODERNA 12+ YRS            00:00:00                         [place] [facility]

[facility]



           VACCINE                                                Branch

 

           SARS-COV-2 COVID-19            2020 Completed             Un[facility]

rsity of



           MODERNA 12+ YRS            00:00:00                         [place] [facility]

ical



           VACCINE                                                Branch

 

           SARS-COV-2 COVID-19            2020 Completed             Unive

rsity of



           MODERNA 12+ YRS            00:00:00                         [place] [facility]

ical



           VACCINE                                                Branch

 

           SARS-COV-2 COVID-19            2020 Completed             Unive

rsity of



           MODERNA 12+ YRS            00:00:00                         [place] [facility]

ical



           VACCINE                                                Branch

 

           SARS-COV-2 COVID-19            2020 Completed             Unive

rsity of



           MODERNA 12+ YRS            00:00:00                         [place] [facility]

ical



           VACCINE                                                Branch

 

           SARS-COV-2 COVID-19            2020 Completed             Unive

rsity of



           MODERNA 12+ YRS            00:00:00                         [place] [facility]

ical



           VACCINE                                                Branch

 

           SARS-COV-2 COVID-19            2020 Completed             Unive

rsity of



           MODERNA 12+ YRS            00:00:00                         [place] [facility]

ical



           VACCINE                                                Branch

 

           SARS-COV-2 COVID-19            2020 Completed             Unive

rsity of



           MODERNA 12+ YRS            00:00:00                         [place] [facility]

ical



           VACCINE                                                Branch

 

           SARS-COV-2 COVID-19            2020 Completed             Unive

rsity of



           MODERNA 12+ YRS            00:00:00                         [place] [facility]

ical



           VACCINE                                                Branch

 

           SARS-COV-2 COVID-19            2020 Completed             Unive

rsity of



           MODERNA 12+ YRS            00:00:00                         [place] [facility]

ical



           VACCINE                                                Branch

 

           SARS-COV-2 COVID-19            2020 Completed             Unive

rsity of



           MODERNA 12+ YRS            00:00:00                         [place] [facility]

ical



           VACCINE                                                Branch

 

           SARS-COV-2 COVID-19            2020 Completed             Unive

rsity of



           MODERNA 12+ YRS            00:00:00                         [place] [facility]

ical



           VACCINE                                                Branch

 

           SARS-COV-2 COVID-19            2020 Completed             Unive

rsity of



           MODERNA 12+ YRS            00:00:00                         [place] [facility]

ical



           VACCINE                                                Branch

 

           SARS-COV-2 COVID-19            2020 Completed             Unive

rsity of



           MODERNA 12+ YRS            00:00:00                         [place] [facility]

ical



           VACCINE                                                Branch

 

           SARS-COV-2 COVID-19            2020 Completed             Unive

rsity of



           MODERNA 12+ YRS            00:00:00                         [place] [facility]

ical



           VACCINE                                                Branch

 

           SARS-COV-2 COVID-19            2020 Completed             Unive

rsity of



           MODERNA 12+ YRS            00:00:00                         [place] [facility]

ical



           VACCINE                                                Branch







Vital Signs







             Vital Name   Observation Time Observation Value Comments     Source

 

             Systolic blood 2022 18:59:00 125 mm[Hg]                [facility]

[facility] of



             pressure                                            [facility]



                                                                 Branch

 

             Diastolic blood 2022 18:59:00 84 mm[Hg]                 [facility]

rsity of



             pressure                                            [facility]



                                                                 Branch

 

             Heart rate   2022 18:59:00 104 /min                  [facility]

[facility]



                                                                 [place] [facility]



                                                                 Branch

 

             Respiratory rate 2022 18:59:00 18 /min                   [facility]

ersity of



                                                                 [place] [facility]



                                                                 Branch

 

             Body weight  2022 18:59:00 127.007 kg                [facility]

[facility] of



                                                                 [place] [facility]



                                                                 Branch

 

             BMI          2022 18:59:00 56.55 kg/m2               [facility]

[facility] of



                                                                 [place] [facility]



                                                                 Branch

 

             Oxygen saturation in 2022 18:59:00 98 /min                   

[facility]



             Arterial blood by                                        [place] [facility]

[facility]



             Pulse oximetry                                        Branch

 

             Systolic blood 2022 19:02:00 142 mm[Hg]                [facility]

sity of



             pressure                                            [place] [facility]



                                                                 Branch

 

             Diastolic blood 2022 19:02:00 99 mm[Hg]                 [facility]

rsity of



             pressure                                            [facility]



                                                                 Branch

 

             Heart rate   2022 19:01:00 91 /min                   [facility]

[facility] of



                                                                 [place] [facility]



                                                                 Branch

 

             Systolic blood 2022 18:00:00 136 mm[Hg]                [facility]

[facility]ity of



             pressure                                            [facility]



                                                                 Branch

 

             Diastolic blood 2022 18:00:00 92 mm[Hg]                 [facility]

[facility] of



             pressure                                            [place] [facility]



                                                                 [facility]

 

             Heart rate   2022 18:00:00 99 /min                   [facility]

[facility]



                                                                 [facility]



                                                                 [facility]

 

             Body temperature 2022 18:00:00 35.83 Tiffanie                 [facility]

[facility]



                                                                 [facility]



                                                                 [facility]

 

             Respiratory rate 2022 18:00:00 18 /min                   [facility]

[facility]



                                                                 [facility]



                                                                 [facility]

 

             Oxygen saturation in 2022 18:00:00 95 /min                   

[facility]



             Arterial blood by                                        [facility]

[facility]



             Pulse oximetry                                        [facility]

 

             Body weight  2022 10:45:00 135.988 kg                [facility]

[facility]



                                                                 [facility]



                                                                 [facility]

 

             BMI          2022 10:45:00 60.55 kg/m2               [facility]

[facility]



                                                                 [facility]



                                                                 [facility]

 

             Body height  2022 02:02:00 149.9 cm                  [facility]

[facility]



                                                                 [facility]



                                                                 [facility]

 

             Systolic blood 2022 01:11:00 166 mm[Hg]                [facility]

[facility]ity of



             pressure                                            [facility]



                                                                 [facility]

 

             Diastolic blood 2022 01:11:00 93 mm[Hg]                 [facility]

[facility] of



             pressure                                            [facility]



                                                                 [facility]

 

             Heart rate   2022 01:09:00 106 /min                  [facility]

[facility]



                                                                 [facility]



                                                                 [facility]

 

             Body temperature 2022 01:09:00 36.89 Tiffanie                 [facility]

[facility]



                                                                 [facility]



                                                                 [facility]

 

             Respiratory rate 2022 01:09:00 20 /min                   [facility]

[facility]



                                                                 [facility]



                                                                 [facility]

 

             Body weight  2022 01:09:00 127.007 kg                [facility]

[facility]



                                                                 [facility]



                                                                 [facility]

 

             BMI          2022 01:09:00 56.55 kg/m2               [facility]

[facility]



                                                                 [facility]



                                                                 [facility]

 

             Oxygen saturation in 2022 01:09:00 96 /min                   

[facility]



             Arterial blood by                                        [facility]

[facility]



             Pulse oximetry                                        [facility]

 

             Systolic blood 2022-10-30 03:00:00 138 mm[Hg]                [facility]

[facility]ity of



             pressure                                            [facility]



                                                                 [facility]

 

             Diastolic blood 2022-10-30 03:00:00 82 mm[Hg]                 [facility]

[facility] of



             pressure                                            [facility]



                                                                 [facility]

 

             Heart rate   2022-10-30 03:00:00 102 /min                  [facility]

[facility]



                                                                 [facility]



                                                                 [facility]

 

             Respiratory rate 2022-10-30 03:00:00 20 /min                   [facility]

[facility]



                                                                 [facility]



                                                                 [facility]

 

             Oxygen saturation in 2022-10-30 03:00:00 97 /min                   

[facility]



             Arterial blood by                                        [facility]

[facility]



             Pulse oximetry                                        [facility]

 

             Body temperature 2022-10-30 00:13:00 36.5 Tiffanie                  [facility]

[facility]



                                                                 [facility]



                                                                 [facility]

 

             Body weight  2022-10-30 00:13:00 132.904 kg                [facility]

[facility]



                                                                 [facility]



                                                                 [facility]

 

             BMI          2022-10-30 00:13:00 59.18 kg/m2               [facility]

[facility]



                                                                 [facility]



                                                                 [facility]

 

             Systolic blood 2022-10-23 12:42:00 128 mm[Hg]                [facility]

[facility]ity of



             pressure                                            [facility]



                                                                 [facility]

 

             Diastolic blood 2022-10-23 12:42:00 83 mm[Hg]                 [facility]

[facility] of



             pressure                                            [facility]



                                                                 [facility]

 

             Heart rate   2022-10-23 12:42:00 111 /min                  [facility]

[facility]



                                                                 [facility]



                                                                 [facility]

 

             Body temperature 2022-10-23 12:42:00 35.94 Tiffanie                 [facility]

[facility]



                                                                 [facility]



                                                                 [facility]

 

             Respiratory rate 2022-10-23 12:42:00 18 /min                   [facility]

[facility]



                                                                 [facility]



                                                                 [facility]

 

             Oxygen saturation in 2022-10-23 12:42:00 95 /min                   

[facility]



             Arterial blood by                                        [facility]

[facility]



             Pulse oximetry                                        [facility]

 

             Body weight  2022-10-23 10:22:00 132.995 kg                [facility]

[facility]



                                                                 [facility]



                                                                 [facility]

 

             BMI          2022-10-23 10:22:00 59.22 kg/m2               [facility]

[facility]



                                                                 [facility]



                                                                 [facility]

 

             Body height  2022-10-21 11:00:00 149.9 cm                  [facility]

[facility]



                                                                 [facility]



                                                                 [facility]

 

             Systolic blood 2022 17:14:00 130 mm[Hg]                [facility]

[facility] of



             pressure                                            [facility]



                                                                 [facility]

 

             Diastolic blood 2022 17:14:00 83 mm[Hg]                 [facility]

[facility]



             pressure                                            [facility]



                                                                 [facility]

 

             Heart rate   2022 17:14:00 100 /min                  [facility]

[facility]



                                                                 [facility]



                                                                 [facility]

 

             Body temperature 2022 17:14:00 36.72 Tiffanie                 [facility]

[facility]



                                                                 [facility]



                                                                 [facility]

 

             Respiratory rate 2022 17:14:00 20 /min                   [facility]

[facility]



                                                                 [facility]



                                                                 [facility]

 

             Oxygen saturation in 2022 17:14:00 96 /min                   

[facility]



             Arterial blood by                                        [facility]

[facility]



             Pulse oximetry                                        [facility]

 

             Body height  2022 10:00:00 149.9 cm                  [facility]

[facility]



                                                                 [facility]



                                                                 [facility]

 

             Body weight  2022 10:00:00 123 kg                    [facility]

[facility]



                                                                 [facility]



                                                                 [facility]

 

             BMI          2022 10:00:00 54.77 kg/m2               [facility]

[facility]



                                                                 [facility]



                                                                 [facility]

 

             Systolic blood 2022 12:40:00 107 mm[Hg]                [facility]

[facility]ity of



             pressure                                            [facility]



                                                                 [facility]

 

             Diastolic blood 2022 12:40:00 64 mm[Hg]                 [facility]

[facility] of



             pressure                                            [facility]



                                                                 [facility]

 

             Heart rate   2022 12:40:00 99 /min                   [facility]

[facility]



                                                                 [facility]



                                                                 [facility]

 

             Body temperature 2022 12:40:00 36.83 Tiffanie                 [facility]

[facility]



                                                                 [facility]



                                                                 [facility]

 

             Respiratory rate 2022 12:40:00 18 /min                   [facility]

[facility]



                                                                 [facility]



                                                                 [facility]

 

             Oxygen saturation in 2022 12:40:00 95 /min                   

[facility]



             Arterial blood by                                        [facility]

[facility]



             Pulse oximetry                                        [facility]

 

             Body height  2022 10:00:00 149.9 cm                  [facility]

[facility]



                                                                 [facility]



                                                                 [facility]

 

             Body weight  2022 10:00:00 123 kg                    [facility]

[facility]



                                                                 [facility]



                                                                 [facility]

 

             BMI          2022 10:00:00 54.77 kg/m2               [facility]

[facility]



                                                                 [facility]



                                                                 [facility]

 

             Heart rate   2022 23:00:00 91 /min                   [facility]

[facility]



                                                                 [facility]



                                                                 [facility]

 

             Oxygen saturation in 2022 23:00:00 95 /min                   

[facility]



             Arterial blood by                                        [facility]

[facility]



             Pulse oximetry                                        [facility]

 

             Systolic blood 2022 22:02:00 134 mm[Hg]                [facility]

[facility]



             pressure                                            [facility]



                                                                 [facility]

 

             Diastolic blood 2022 22:02:00 83 mm[Hg]                 [facility]

[facility]



             pressure                                            [facility]



                                                                 [facility]

 

             Body temperature 2022 21:00:00 36.83 Tiffanie                 [facility]

[facility]



                                                                 [facility]



                                                                 [facility]

 

             Respiratory rate 2022 21:00:00 28 /min                   [facility]

[facility]



                                                                 [facility]



                                                                 [facility]

 

             Body weight  2022 09:00:00 134.99 kg                 [facility]

[facility]



                                                                 [facility]



                                                                 [facility]

 

             BMI          2022 09:00:00 60.08 kg/m2               [facility]

[facility]



                                                                 [facility]



                                                                 [facility]

 

             Body height  2022 22:22:00 149.9 cm                  [facility]

[facility]



                                                                 [facility]



                                                                 [facility]

 

             Systolic blood 2022 03:38:00 126 mm[Hg]                [facility]

[facility] of



             pressure                                            [facility]



                                                                 [facility]

 

             Diastolic blood 2022 03:38:00 90 mm[Hg]                 [facility]

[facility] of



             pressure                                            [facility]



                                                                 [facility]

 

             Heart rate   2022 03:38:00 97 /min                   [facility]

[facility]



                                                                 [facility]



                                                                 [facility]

 

             Respiratory rate 2022 03:38:00 18 /min                   [facility]

[facility]



                                                                 [facility]



                                                                 [facility]

 

             Oxygen saturation in 2022 03:38:00 97 /min                   

[facility]



             Arterial blood by                                        [facility]

[facility]



             Pulse oximetry                                        [facility]

 

             Body temperature 2022-07-15 22:09:00 36.94 Tiffanie                 [facility]

[facility]



                                                                 [facility]



                                                                 [facility]

 

             Body height  2022-07-15 22:09:00 149.9 cm                  [facility]

[facility]



                                                                 [facility]



                                                                 [facility]

 

             Body weight  2022-07-15 22:09:00 127.007 kg                [facility]

[facility]



                                                                 [facility]



                                                                 [facility]

 

             BMI          2022-07-15 22:09:00 56.55 kg/m2               [facility]

[facility]



                                                                 [facility]



                                                                 [facility]

 

             Systolic blood 2022 11:00:00 143 mm[Hg]                [facility]

[facility] of



             pressure                                            [facility]



                                                                 [facility]

 

             Diastolic blood 2022 11:00:00 91 mm[Hg]                 [facility]

[facility] of



             pressure                                            [facility]



                                                                 [facility]

 

             Heart rate   2022 11:00:00 100 /min                  [facility]

[facility]



                                                                 [facility]



                                                                 [facility]

 

             Respiratory rate 2022 11:00:00 16 /min                   [facility]

[facility]



                                                                 [facility]



                                                                 [facility]

 

             Oxygen saturation in 2022 11:00:00 96 /min                   

[facility]



             Arterial blood by                                        [facility]

[facility]



             Pulse oximetry                                        [facility]

 

             Body temperature 2022 09:55:00 36.89 Tiffanie                 [facility]

[facility]



                                                                 [facility]



                                                                 [facility]

 

             Body height  2022 09:55:00 149.9 cm                  [facility]

[facility]



                                                                 [facility]



                                                                 [facility]

 

             Body weight  2022 09:55:00 127.007 kg                [facility]

[facility]



                                                                 [facility]



                                                                 [facility]

 

             BMI          2022 09:55:00 56.55 kg/m2               [facility]

[facility]



                                                                 [facility]



                                                                 [facility]

 

             Systolic blood 2022 00:43:00 132 mm[Hg]                [facility]

[facility] of



             pressure                                            [facility]



                                                                 [facility]

 

             Diastolic blood 2022 00:43:00 88 mm[Hg]                 [facility]

[facility] of



             pressure                                            [facility]



                                                                 [facility]

 

             Heart rate   2022 00:43:00 107 /min                  [facility]

[facility]



                                                                 [facility]



                                                                 [facility]

 

             Body temperature 2022 00:43:00 36.33 Tiffanie                 [facility]

[facility]



                                                                 [facility]



                                                                 [facility]

 

             Respiratory rate 2022 00:43:00 18 /min                   [facility]

[facility]



                                                                 [facility]



                                                                 [facility]

 

             Oxygen saturation in 2022 00:43:00 95 /min                   

[facility]



             Arterial blood by                                        [facility]

[facility]



             Pulse oximetry                                        [facility]

 

             Body height  2022 11:31:00 149.9 cm                  [facility]

[facility]



                                                                 [facility]



                                                                 [facility]

 

             Body weight  2022 11:31:00 127.007 kg                [facility]

[facility]



                                                                 [facility]



                                                                 [facility]

 

             BMI          2022 11:31:00 56.55 kg/m2               [facility]

[facility]



                                                                 [facility]



                                                                 [facility]

 

             Body temperature 2022 16:10:00 36.22 Tiffanie                 [facility]

[facility]



                                                                 [facility]



                                                                 [facility]

 

             Respiratory rate 2022 16:10:00 16 /min                   [facility]

[facility]



                                                                 [facility]



                                                                 [facility]

 

             Oxygen saturation in 2022 16:10:00 96 /min                   

[facility]



             Arterial blood by                                        [facility]

[facility]



             Pulse oximetry                                        [facility]

 

             Systolic blood 2022 16:10:00 127 mm[Hg]                [facility]

[facility] of



             pressure                                            [facility]



                                                                 [facility]

 

             Diastolic blood 2022 16:10:00 84 mm[Hg]                 [facility]

[facility] of



             pressure                                            [facility]



                                                                 [facility]

 

             Heart rate   2022 16:10:00 98 /min                   [facility]

[facility]



                                                                 [facility]



                                                                 [facility]

 

             Body weight  2022 08:54:00 133.494 kg                [facility]

[facility]



                                                                 [facility]



                                                                 [facility]

 

             BMI          2022 08:54:00 50.52 kg/m2               [facility]

[facility]



                                                                 [facility]



                                                                 [facility]

 

             Body height  2022 03:01:00 162.6 cm                  [facility]

[facility]



                                                                 [facility]



                                                                 [facility]

 

             Systolic blood 2022-06-10 17:03:00 132 mm[Hg]                [facility]

[facility] of



             pressure                                            [facility]



                                                                 [facility]

 

             Diastolic blood 2022-06-10 17:03:00 90 mm[Hg]                 [facility]

[facility] of



             pressure                                            [facility]



                                                                 [facility]

 

             Heart rate   2022-06-10 17:03:00 78 /min                   [facility]

[facility]



                                                                 [facility]



                                                                 Branch

 

             Body temperature 2022-06-10 17:03:00 35.83 Tiffanie                 Univ

ers[facility] of



                                                                 [facility]



                                                                 Branch

 

             Respiratory rate 2022-06-10 17:03:00 14 /min                   Univ

ersity of



                                                                 [facility]

 

             Oxygen saturation in 2022-06-10 17:03:00 93 /min                   

[facility]



             Arterial blood by                                        [facility]

sarah



             Pulse oximetry                                        Branch

 

             Body weight  2022-06-10 09:00:00 140.933 kg                [facility]

ty of



                                                                 [facility]



                                                                 Branch

 

             BMI          2022-06-10 09:00:00 62.75 kg/m2               [facility]

ty of



                                                                 [facility]



                                                                 Branch

 

             Body height  2022 12:47:00 149.9 cm                  [facility]

ty of



                                                                 [facility]



                                                                 Branch

 

             Systolic blood 2022 20:40:00 125 mm[Hg]                [facility]iver

sity of



             pressure                                            [facility]



                                                                 Branch

 

             Diastolic blood 2022 20:40:00 75 mm[Hg]                 Unive

rsity of



             pressure                                            [facility]



                                                                 Branch

 

             Heart rate   2022 20:40:00 99 /min                   [facility]

ty of



                                                                 [facility]



                                                                 Branch

 

             Body temperature 2022 20:40:00 36.67 Tiffanie                 Univ

ers[facility] of



                                                                 [facility]



                                                                 Branch

 

             Respiratory rate 2022 20:40:00 20 /min                   [facility]iv

ers[facility] of



                                                                 [facility]



                                                                 Branch

 

             Oxygen saturation in 2022 20:40:00 93 /min                   

[facility]



             Arterial blood by                                        [facility]

[facility]



             Pulse oximetry                                        Branch

 

             Body weight  2022 22:13:00 137.485 kg                [facility]

ty of



                                                                 [facility]



                                                                 Branch

 

             BMI          2022 22:13:00 61.22 kg/m2               [facility]

ty of



                                                                 [facility]



                                                                 Branch

 

             Body height  2022 09:10:00 149.9 cm                  [facility]

ty of



                                                                 [facility]



                                                                 Branch

 

             Systolic blood 2022 15:49:00 111 mm[Hg]                Univer

sity of



             pressure                                            [facility]



                                                                 Branch

 

             Diastolic blood 2022 15:49:00 76 mm[Hg]                 Unive

rsity of



             pressure                                            [facility]



                                                                 Branch

 

             Heart rate   2022 15:49:00 109 /min                  [facility]

ty of



                                                                 [facility]



                                                                 Branch

 

             Body temperature 2022 15:49:00 36.06 Tiffanie                 Univ

ersity of



                                                                 [facility]



                                                                 Branch

 

             Respiratory rate 2022 15:49:00 18 /min                   Univ

ersity of



                                                                 [facility]



                                                                 Branch

 

             Oxygen saturation in 2022 15:49:00 92 /min                   

[facility]



             Arterial blood by                                        [facility]

[facility]



             Pulse oximetry                                        Branch

 

             Body weight  2022 10:47:00 139.481 kg                [facility]

ty of



                                                                 [facility]



                                                                 Branch

 

             BMI          2022 10:47:00 62.11 kg/m2               [facility]

ty of



                                                                 [facility]



                                                                 Branch

 

             Body height  2022 06:57:00 149.9 cm                  [facility]

ty of



                                                                 [facility]



                                                                 Branch

 

             Systolic blood 2022 06:00:00 144 mm[Hg]                Univer

sity of



             pressure                                            [facility]



                                                                 Branch

 

             Diastolic blood 2022 06:00:00 93 mm[Hg]                 Unive

rsity of



             pressure                                            [facility]



                                                                 Branch

 

             Heart rate   2022 06:00:00 92 /min                   [facility]

ty of



                                                                 [facility]



                                                                 Branch

 

             Respiratory rate 2022 06:00:00 22 /min                   Univ

ersity of



                                                                 [facility]



                                                                 Branch

 

             Oxygen saturation in 2022 04:00:00 94 /min                   

[facility]



             Arterial blood by                                        [facility]

[facility]



             Pulse oximetry                                        Branch

 

             Body temperature 2022 03:45:00 37.06 Tiffanie                 Univ

ers[facility] of



                                                                 [facility]



                                                                 Branch

 

             Body height  2022 03:45:00 149.9 cm                  [facility]

ty of



                                                                 [facility]



                                                                 Branch

 

             Body weight  2022 03:45:00 127.461 kg                [facility]

ty of



                                                                 [facility]



                                                                 [facility]anch

 

             BMI          2022 03:45:00 56.76 kg/m2               [facility]

ty of



                                                                 [facility]



                                                                 [facility]anch

 

             Systolic blood 2022 03:18:00 113 mm[Hg]                Univer

sity of



             pressure                                            [facility]



                                                                 Branch

 

             Diastolic blood 2022 03:18:00 85 mm[Hg]                 Unive

rsity of



             pressure                                            [facility]



                                                                 [facility]anch

 

             Heart rate   2022 03:18:00 96 /min                   [facility]

ty of



                                                                 [facility]



                                                                 [facility]

 

             Respiratory rate 2022 03:18:00 18 /min                   Univ

ersity of



                                                                 [facility]



                                                                 Branch

 

             Oxygen saturation in 2022 03:18:00 93 /min                   

[facility]



             Arterial blood by                                        [facility]

[facility]



             Pulse oximetry                                        [facility]anch

 

             Body temperature 2022 01:01:16 36.89 Tiffanie                 Univ

ersity of



                                                                 [facility]



                                                                 [facility]anch

 

             Body weight  2022 23:13:00 127.461 kg                [facility]

ty of



                                                                 [facility]



                                                                 Branch

 

             BMI          2022 23:13:00 56.76 kg/m2               [facility]

ty of



                                                                 [facility]



                                                                 [facility]anch

 

             Systolic blood 2022 21:53:00 142 mm[Hg]                Univer

sity of



             pressure                                            [facility]



                                                                 Branch

 

             Diastolic blood 2022 21:53:00 88 mm[Hg]                 Unive

rsity of



             pressure                                            [facility]



                                                                 Branch

 

             Heart rate   2022 21:53:00 96 /min                   [facility]

ty of



                                                                 [facility]



                                                                 [facility]anch

 

             Body temperature 2022 21:53:00 36.06 Tiffanie                 Univ

ersity of



                                                                 [facility]



                                                                 [facility]anch

 

             Respiratory rate 2022 21:53:00 18 /min                   Univ

ersity of



                                                                 [facility]



                                                                 [facility]anch

 

             Oxygen saturation in 2022 21:53:00 96 /min                   

[facility]



             Arterial blood by                                        [facility]

[facility]



             Pulse oximetry                                        Branch

 

             Body weight  2022 09:48:00 138.801 kg                [facility]

ty of



                                                                 [facility]



                                                                 [facility]anch

 

             BMI          2022 09:48:00 61.80 kg/m2               [facility]

ty of



                                                                 [facility]



                                                                 [facility]anch

 

             Body height  2022 10:27:00 149.9 cm                  [facility]

ty of



                                                                 [facility]



                                                                 [facility]

 

             Systolic blood 2022 03:50:00 142 mm[Hg]                Univer

sity of



             pressure                                            [facility]



                                                                 Branch

 

             Diastolic blood 2022 03:50:00 95 mm[Hg]                 Unive

rsity of



             pressure                                            [facility]



                                                                 Branch

 

             Heart rate   2022 03:50:00 93 /min                   Univers[facility]

ty of



                                                                 [facility]

 

             Respiratory rate 2022 03:50:00 17 /min                   Univ

ersity of



                                                                 [facility]



                                                                 Branch

 

             Oxygen saturation in 2022 03:50:00 98 /min                   

[facility]



             Arterial blood by                                        [facility]

sar[facility]



             Pulse oximetry                                        Branch

 

             Body temperature 2022 20:39:00 36.5 Tiffanie                  Univ

ersity of



                                                                 [facility]

 

             Body weight  2022 20:39:00 136.079 kg                Universi

ty of



                                                                 [facility]

 

             BMI          2022 20:39:00 60.59 kg/m2               Univers[facility]

ty of



                                                                 [facility]

 

             Systolic blood 2022 01:46:00 134 mm[Hg]                Univer

sity of



             pressure                                            [facility]



                                                                 Branch

 

             Diastolic blood 2022 01:46:00 100 mm[Hg]                Unive

rsity of



             pressure                                            [facility]



                                                                 [facility]

 

             Heart rate   2022 01:46:00 102 /min                  [facility]

ty of



                                                                 [facility]



                                                                 [facility]

 

             Respiratory rate 2022 01:46:00 22 /min                   Univ

ersity of



                                                                 [facility]



                                                                 Branch

 

             Oxygen saturation in 2022 01:46:00 96 /min                   

[facility]



             Arterial blood by                                        [facility]

sarah



             Pulse oximetry                                        Branch

 

             Body temperature 2022-01-15 20:52:00 36.67 Tiffanie                 Univ

ersity of



                                                                 [facility]

 

             Body weight  2022-01-15 20:52:00 136.079 kg                Universi

ty of



                                                                 [facility]

 

             BMI          2022-01-15 20:52:00 60.59 kg/m2               Universi

ty of



                                                                 [facility]

 

             height       2021 11:20:00 59 [in_i]                 [facility]

 

             weight       2021 11:20:00 350 [lb_av]               [facility]

 

             temperature  2021 11:20:00 97.8 [degF]               [facility]

 

             bmi          2021 11:20:00 70.68 kg/m2               [facility]

 

             oximetry     2021 11:20:00 94 %                      [facility]

[facility] -



                                                                 [facility]

 

             blood pressure 2021 11:20:00 160 mm[Hg]                [facility]

 [facility] -



             systolic                                            [facility]

 

             blood pressure 2021 11:20:00 80 mm[Hg]                 [facility]

 [facility]



             diastolic                                           [facility]

 

             Systolic blood 2021 23:30:00 175 mm[Hg]                Univer

sity of



             [facility]

 

             Diastolic blood 2021 23:30:00 107 mm[Hg]                [facility]



             [facility]

 

             Heart rate   2021 23:30:00 81 /min                   [facility]

 

             Respiratory rate 2021 23:30:00 21 /min                   [facility]

 

             Oxygen saturation in 2021 23:30:00 97 /min                   

[facility]



             Arterial blood by                                        [facility]

sarah



             Pulse oximetry                                        Branch

 

             Body weight  2021 21:55:00 136.079 kg                [facility]

 

             BMI          2021 21:55:00 60.59 kg/m2               [facility]

 

             Body temperature 2021 21:55:00 37.44 Tiffanie                 [facility]

 

             height       2021 13:00:00 59 [in_i]                 [facility]

 

             weight       2021 13:00:00 350 [lb_av]               [facility]

 

             temperature  2021 13:00:00 96.8 [degF]               [facility]

[facility]

 

             bmi          2021 13:00:00 70.68 kg/m2               [facility]

 

             oximetry     2021 13:00:00 96 %                      [facility]

 

             blood pressure 2021 13:00:00 120 mm[Hg]                [facility]

 [facility]



             systolic                                            [facility]

 

             blood pressure 2021 13:00:00 77 mm[Hg]                 [facility]

 [facility]



             diastolic                                           [facility]

 

             height       2021 14:20:00 59 [in_i]                 [facility]

[facility] -



                                                                 [facility]

 

             weight       2021 14:20:00 350 [lb_av]               [facility]

 

             temperature  2021 14:20:00 95 [degF]                 [facility]

[facility] -



                                                                 [facility]

 

             bmi          2021 14:20:00 70.68 kg/m2               [facility]

[facility] -



                                                                 [facility]

 

             oximetry     2021 14:20:00 98 %                      [facility]

[facility] -



                                                                 [facility]

 

             blood pressure 2021 14:20:00 128 mm[Hg]                [facility]

 [facility]



             systolic                                            [facility]

 

             blood pressure 2021 14:20:00 82 mm[Hg]                 [facility]

 [facility]



             diastolic                                           [facility]

 

             height       2021 13:40:00 59 [in_i]                 [facility]

[facility]

 

             weight       2021 13:40:00 350 [lb_av]               [facility]

 

             temperature  2021 13:40:00 97.4 [degF]               [facility]

[facility]

 

             bmi          2021 13:40:00 70.68 kg/m2               [facility]

[facility]

 

             oximetry     2021 13:40:00 91 %                      [facility]

[facility] -



                                                                 [facility]

 

             blood pressure 2021 13:40:00 132 mm[Hg]                [facility]

 [facility]



             systolic                                            [facility]

 

             blood pressure 2021 13:40:00 87 mm[Hg]                 [facility]

 [facility]



             diastolic                                           [facility]

 

             Systolic blood 2021-05-10 01:30:00 163 mm[Hg]                Univer

sity of



             [facility]

 

             Diastolic blood 2021-05-10 01:30:00 106 mm[Hg]                Unive

rsity of



             [facility]

 

             Heart rate   2021-05-10 01:30:00 97 /min                   [facility]

[facility]

 

             Respiratory rate 2021-05-10 01:30:00 21 /min                   Univ

ersity of



                                                                 [facility]



                                                                 Branch

 

             Oxygen saturation in 2021-05-10 01:30:00 97 /min                   

[facility]



             Arterial blood by                                        [facility]

sarah



             Pulse oximetry                                        [facility]anch

 

             Body temperature 2021 23:27:00 36.83 Tiffanie                 Univ

ersity of



                                                                 [facility]



                                                                 Branch

 

             Body height  2021 23:27:00 149.9 cm                  [facility]

[facility] of



                                                                 [facility]



                                                                 [facility]anch

 

             Body weight  2021 23:27:00 136.079 kg                [facility]

ty of



                                                                 [facility]



                                                                 [facility]anch

 

             BMI          2021 23:27:00 60.59 kg/m2               [facility]

ty of



                                                                 [facility]



                                                                 Branch

 

             Systolic blood 2021-05-10 01:30:00 163 mm[Hg]                Univer

sity of



             pressure                                            [facility]



                                                                 Branch

 

             Diastolic blood 2021-05-10 01:30:00 106 mm[Hg]                Unive

rsity of



             pressure                                            [facility]



                                                                 Branch

 

             Heart rate   2021-05-10 01:30:00 97 /min                   [facility]

ty of



                                                                 [facility]



                                                                 Branch

 

             Respiratory rate 2021-05-10 01:30:00 21 /min                   Univ

ersity of



                                                                 [facility]



                                                                 Branch

 

             Oxygen saturation in 2021-05-10 01:30:00 97 /min                   

[facility]



             Arterial blood by                                        [facility]

[facility]



             Pulse oximetry                                        Br[facility]

 

             Body temperature 2021 23:27:00 36.83 Tiffanie                 [facility]

[facility]



                                                                 [facility]



                                                                 [facility]

 

             Body height  2021 23:27:00 149.9 cm                  [facility]

[facility]



                                                                 [facility]



                                                                 [facility]anch

 

             Body weight  2021 23:27:00 136.079 kg                [facility]

[facility]



                                                                 [facility]



                                                                 Branch

 

             BMI          2021 23:27:00 60.59 kg/m2               [facility]

ty of



                                                                 [facility]



                                                                 Branch

 

             Systolic blood 2022 16:49:00 127 mm[Hg]                [facility]

[facility]



             pressure                                            [facility]



                                                                 Branch

 

             Diastolic blood 2022 16:49:00 86 mm[Hg]                 Unive

[facility]ity [facility]



             pressure                                            [facility]



                                                                 Branch

 

             Heart rate   2022 16:49:00 101 /min                  [facility]

ty [facility]



                                                                 [facility]



                                                                 Branch

 

             Body temperature 2022 16:49:00 36.83 Tiffanie                 [facility]

[facility]



                                                                 [facility]



                                                                 Branch

 

             Respiratory rate 2022 16:49:00 20 /min                   [facility]

[facility]



                                                                 [facility]



                                                                 Branch

 

             Oxygen saturation in 2022 16:49:00 95 /min                   

[facility]



             Arterial blood by                                        [facility]

[facility]



             Pulse oximetry                                        [facility]

 

             Body height  2022 10:00:00 149.9 cm                  [facility]

 

             Body weight  2022 10:00:00 123 kg                    [facility]

 

             BMI          2022 10:00:00 54.77 kg/m2               [facility]

 

             Temperature Oral (F) 2022 21:12:00 98.0 F                    

[facility]

 

             Heart Rate   2022 21:12:00                           [facility]

 

             Respitory Rate 2022 21:12:00                           [facility]

[facility]

 

             Systolic (mm Hg) 2022 21:12:00                           [facility]

[facility]

 

             Diastolic (mm Hg) 2022 21:12:00                           [facility]

 

             Heart Rate   2022 17:09:26                           [facility]

 

             Respitory Rate 2022 17:09:26                           [facility]

[facility]

 

             Temperature Oral (F) 2022 17:09:08 98.2 F                    

[facility]

 

             Systolic (mm Hg) 2022 17:08:50                           [facility]

[facility] [place]

 

             Diastolic (mm Hg) 2022 17:08:50                           [facility]

 

             Heart Rate   2022 17:08:50                           [facility]

 

             Respitory Rate 2022 13:07:22                           [facility]

[facility]

 

             Temperature Oral (F) 2022 13:07:04 97.7 F                    

[facility]

 

             Systolic (mm Hg) 2022 13:06:57                           [facility]

[facility]

 

             Diastolic (mm Hg) 2022 13:06:57                           [facility]

 

             Heart Rate   2022 10:00:21                           [facility]

 

             Respitory Rate 2022 10:00:21                           [facility]

[facility]

 

             Temperature Oral (F) 2022 09:59:28 97.5 F                    

[facility]

 

             Systolic (mm Hg) 2022 09:58:18                           [facility]

 

             Diastolic (mm Hg) 2022 09:58:18                           [facility]

[facility]

 

             Heart Rate   2022 09:58:18                           [facility]

 [place]

 

             Heart Rate   2022 04:58:41                           [facility]

 

             Respitory Rate 2022 04:58:41                           [facility]

[facility]

 

             Temperature Oral (F) 2022 04:58:34 97.2 F                    

[facility]

 

             Systolic (mm Hg) 2022 04:57:48                           [facility]

 

             Diastolic (mm Hg) 2022 04:57:48                           [facility]

 

             Respitory Rate 2022 00:54:28                           [facility]

[facility]

 

             Systolic (mm Hg) 2022 00:54:19                           [facility]

[facility]

 

             Diastolic (mm Hg) 2022 00:54:19                           [facility]

[facility]

 

             Temperature Oral (F) 2022 00:53:24 98.1 F                    

[facility]

 

             Height       2022 23:39:00 149.86 cm                 [facility]

 

             Weight       2022 23:39:00                           [facility]

 [place]

 

             BMI Calculated 2022 23:39:00                           [facility]

[facility]

 

             Systolic (mm Hg) 2022 14:00:00                           [facility]

 

             Diastolic (mm Hg) 2022 14:00:00                           [facility]

 

             Respitory Rate 2022 14:00:00                           [facility]

[facility] [place]

 

             Respitory Rate 2022 13:00:00                           [facility]

[facility]

 

             Systolic (mm Hg) 2022 13:00:00                           [facility]

 

             Diastolic (mm Hg) 2022 13:00:00                           [facility]

 

             Respitory Rate 2022 12:00:00                           [facility]

[facility] [place]

 

             Systolic (mm Hg) 2022 12:00:00                           [facility]

 

             Diastolic (mm Hg) 2022 12:00:00                           [facility]

 

             Respitory Rate 2022 05:00:00                           [facility]

[facility] [place]

 

             Systolic (mm Hg) 2022 05:00:00                           [facility]

[facility] [place]

 

             Diastolic (mm Hg) 2022 05:00:00                           [facility]

 

             Respitory Rate 2022 04:00:00                           [facility]

[facility] [place]

 

             Systolic (mm Hg) 2022 04:00:00                           [facility]

 

             Diastolic (mm Hg) 2022 04:00:00                           [facility]

 

             Respitory Rate 2022 03:00:00                           [facility]

[facility]

 

             Systolic (mm Hg) 2022 03:00:00                           [facility] [place]

 

             Diastolic (mm Hg) 2022 03:00:00                           [facility]

[facility]

 

             Heart Rate   2022 15:55:41                           [facility]

 [facility]

 

             Temperature Oral (F) 2022 15:55:32 98.3 F                    

[facility]

 

             Heart Rate   2022 15:54:37                           [facility]

 

             Heart Rate   2022 12:20:12                           [facility]

 [place]

 

             Temperature Oral (F) 2022 12:19:51 97.7 F                    

[facility]

 

             Temperature Oral (F) 2022 09:20:54 97.9 F                    

[facility]

 

             Height       2022 06:18:00 149.86 cm                 [facility]

 

             Weight       2022 06:18:00                           [facility]

 

             BMI Calculated 2022 06:18:00                           [facility]

[facility]

 

             Respitory Rate 2018 17:30:00                           [facility]

[facility] [place]

 

             Systolic (mm Hg) 2018 17:30:00                           [facility]

[facility]

 

             Diastolic (mm Hg) 2018 17:30:00                           [facility]

[facility]

 

             Temperature Oral (F) 2018 17:30:00 98.4 F                    

[facility]

 

             Temperature Oral (F) 2018 16:23:00 98.6 F                    

[facility]

 

             Systolic (mm Hg) 2018 16:23:00                           [facility]

 

             Diastolic (mm Hg) 2018 16:23:00                           [facility]

[facility]

 

             Respitory Rate 2018 14:00:00                           [facility]

[facility]

 

             Systolic (mm Hg) 2018 14:00:00                           [facility]

[facility]

 

             Diastolic (mm Hg) 2018 14:00:00                           [facility]

[facility]

 

             Respitory Rate 2018 13:30:00                           [facility]

[facility]

 

             BMI Calculated 2018 10:16:00                           [facility]

[facility]

 

             Height       2018 10:16:00 149.86 cm                 [facility]

 

             Weight       2018 10:16:00                           [facility]

 [facility]

 

             Heart Rate   2018 10:16:00                           [facility]

 [place]

 

             Temperature Oral (F) 2018 10:16:00 97.9 F                    

[facility]

 

             Temperature Oral (F) 2018 13:40:00 97.2 F                    

[facility]

 

             Systolic (mm Hg) 2018 13:40:00                           [facility]

[facility]

 

             Diastolic (mm Hg) 2018 13:40:00                           [facility]

[facility]

 

             Heart Rate   2018 13:40:00                           [facility]

 [facility]

 

             Respitory Rate 2018 13:40:00                           [facility]

[facility]

 

             Heart Rate   2018 05:24:00                           [facility]

 [facility]

 

             Systolic (mm Hg) 2018 05:24:00                           [facility]

[facility] [place]

 

             Diastolic (mm Hg) 2018 05:24:00                           [facility]

[facility]

 

             Respitory Rate 2018 05:24:00                           [facility]

[facility]

 

             Temperature Oral (F) 2018 05:24:00 98.1 F                    

[facility]

 

             Respitory Rate 2018 01:15:00                           [facility]

[facility] [place]

 

             Systolic (mm Hg) 2018 01:15:00                           [facility]

[facility]

 

             Diastolic (mm Hg) 2018 01:15:00                           [facility]

[facility]

 

             Heart Rate   2018 01:15:00                           [facility]

 [place]

 

             Temperature Oral (F) 2018 01:15:00 98.1 F                    

[facility]

 

             Weight       2018 14:00:00                           [facility]

 [facility]

 

             Weight       2018 14:00:00                           [facility]

 [place]

 

             Weight       2018 14:00:00                           [facility]

 [place]

 

             BMI Calculated 2018 05:43:00                           [facility]

[facility]

 

             Height       2018 05:43:00 162.56 cm                 [facility]

 [facility]

 

             Height       2018 22:41:00 149.86 cm                 [facility]

 [facility]

 

             BMI Calculated 2018 22:41:00                           [facility]

[facility] [place]







Procedures







                Procedure       Date / Time     Performing Clinician Source



                                Performed                       

 

                PATIENT QUESTIONNAIRE 2022 06:01:00 Doctor Unassigned, No 

[facility]

 

                POCT HEMOGLOBIN A1C TEST 2022 19:04:00 Mary Anne Ramirez         [facility]

 

                POCT GLUCOSE (AUTOMATED) 2022 22:58:00 Edwardo Lopez   [facility]

 

                POCT GLUCOSE (AUTOMATED) 2022 18:00:00 Edwardo Lopez   [facility]

 

                POCT GLUCOSE (AUTOMATED) 2022 13:46:00 Edwardo Lopez   [facility]

 

                BASIC METABOLIC PANEL 2022 10:49:00 Tabitha, Kaylynn  [facility] [place]



                (NA, K, CL, CO2, GLUCOSE,                                 Medica

l Branch



                BUN, CREATININE, CA)                                 

 

                CBC WITH DIFF   2022 10:49:00 Tabitha Kaylynn  [facility]

 

                LACTIC ACID WHOLE BLOOD 2022 02:31:00 Tabitha [facility]

 

                MRSA / MSSA SCREEN BY 2022 02:31:00 Edwardo Lopez   [facility]



                PCR, [facility]

 

                POCT GLUCOSE (AUTOMATED) 2022 02:09:00 Edwardo Lopez   [facility]

 

                BLOOD CULTURE SCREEN 2022 00:35:00 Neeta Maria [facility]

 

                URINALYSIS      2022 23:26:00 Neeta Maria [facility]

 

                BLOOD CULTURE SCREEN 2022 23:15:00 Neeta Maria [facility]

 

                COMP. METABOLIC PANEL 2022 23:08:00 Neeta Maria [facility]

[facility] [place]



                ([id number])                                         [facility]

 

                CBC WITH DIFF   2022 23:08:00 Neeta Maria [facility]

 

                LACTIC ACID WHOLE BLOOD 2022 23:07:00 Neeta Maria [facility]

 

                CT ABDOMEN PELVIS W 2022-10-30 02:06:00 Ashley Garay [facility]



                CONTRAST                                        [facility]

 

                LIPASE          2022-10-30 00:59:00 Ashley Garay [facility]

 

                COMP. METABOLIC PANEL 2022-10-30 00:59:00 Ashley Garay [facility]



                ([id number])                                         Medical Branch

 

                CBC WITH DIFF   2022-10-30 00:59:00 Ashley Garay [facility]

 

                URINALYSIS      2022-10-30 00:49:00 Ashley Garay [facility]

 

                POCT GLUCOSE (AUTOMATED) 2022-10-23 13:14:00 Edsofia [facility]

 

                POCT GLUCOSE (AUTOMATED) 2022-10-23 01:14:00 Edsofia [facility]

 

                POCT GLUCOSE (AUTOMATED) 2022-10-22 21:19:00 Leo [facility]

 

                BASIC METABOLIC PANEL 2022-10-22 11:23:00 [facility]



                (NA, K, CL, CO2, GLUCOSE,                                 Medica

l Branch



                BUN, CREATININE, CA)                                 

 

                CBC WITH DIFF   2022-10-22 [facility]16[facility]00 [facility]

 

                MRSA / MSSA SCREEN BY 2022-10-22 01:13:00 Edwardo Lopez   [facility]



                PCR, [facility]

 

                POCT GLUCOSE (AUTOMATED) 2022-10-22 01:11:00 Leo [facility]

 

                POCT GLUCOSE (AUTOMATED) 2022-10-21 21:32:00 Leo [facility]

 

                URINE CULTURE   2022-10-21 [facility]

 

                POCT GLUCOSE (AUTOMATED) 2022-10-21 16:08:00 Leo [facility]

 

                POCT GLUCOSE (AUTOMATED) 2022-10-21 12:43:00 Leo [facility]

 

                ASPIRATE OR ABSCESS 2022-10-21 07:35:00 Davey, Christopher [facility]

[facility] [place]



                CULTURE(AEROBIC/ANAEROBIC                                 Medica

l Branch



                )                                               

 

                URINALYSIS      2022-10-21 07:32:00 [place], [facility]

 

                CT ABDOMEN PELVIS W 2022-10-21 07:25:15 Davey [facility]

[facility] [place]



                CONTRAST                                        [facility]

 

                BLOOD CULTURE SCREEN 2022-10-21 06:01:00 [facility]

 

                LIPASE          2022-10-21 06:01:00 Davey [facility]

 

                COMP. METABOLIC PANEL 2022-10-21 06:01:00 Yoni Mariscal [facility]



                ([id number])                                         [facility]

 

                CBC WITH DIFF   2022-10-21 [facility]

 

                POCT GLUCOSE (AUTOMATED) 2022 18:34:00 Scooby Sharpe [facility]

 

                POCT GLUCOSE (AUTOMATED) 2022 14:37:00 Scooby Sharpe

[facility]

 

                BASIC METABOLIC PANEL 2022 13:22:00 [facility]



                (NA, K, CL, CO2, GLUCOSE,                                 Medica

l Branch



                BUN, CREATININE, CA)                                 

 

                CBC WITH DIFF   2022 13:22:00 [facility]

 

                POCT GLUCOSE (AUTOMATED) 2022 10:56:00 Scooby Sharpe [facility]

 

                POCT GLUCOSE (AUTOMATED) 2022 05:46:00 Scooby Sharpe

[facility]

 

                POCT GLUCOSE (AUTOMATED) 2022 02:17:00 Scooby Sharpe

[facility]

 

                POCT GLUCOSE (AUTOMATED) 2022 23:13:00 Scooby Sharpe

[facility]

 

                POCT GLUCOSE (AUTOMATED) 2022 18:22:00 Scooby Sharpe

[facility]

 

                POCT GLUCOSE (AUTOMATED) 2022 14:56:00 Scooby Sharpe

[facility]

 

                BASIC METABOLIC PANEL 2022 10:03:00 Finn Matt [facility]

[facility] [place]



                (NA, K, CL, CO2, GLUCOSE,                                 Medica

l Branch



                BUN, CREATININE, CA)                                 

 

                CBC WITH DIFF   2022 10:03:00 Yvrose Brisenonathan [facility]

 

                POCT GLUCOSE (AUTOMATED) 2022 02:18:00 Scooby Sharpe

[facility]

 

                POCT GLUCOSE (AUTOMATED) 2022-08-10 22:26:00 [facility]oby Sharpe

[facility]

 

                POCT GLUCOSE (AUTOMATED) 2022-08-10 18:57:00 Scooby Sharpe

[facility]

 

                POCT GLUCOSE (AUTOMATED) 2022-08-10 18:57:00 Scooby Sharpe

[facility] DUPLEX VENOUS ARMS 2022-08-10 17:28:45 Finn Matt [facility]

[facility] [place]



                BILATERAL - BY VASCULAR                                 [facility] 

Branch



                LAB                                             

 

                US DUPLEX VENOUS ARMS 2022-08-10 17:28:45 Finn Matt [facility]

[facility] [place]



                BILATERAL - BY VASCULAR                                 [facility]



                LAB                                             

 

                POCT GLUCOSE (AUTOMATED) 2022-08-10 15:20:00 Scooby Sharpe

[facility]

 

                POCT GLUCOSE (AUTOMATED) 2022-08-10 15:20:00 Scooby Shrape

[facility]

 

                MAGNESIUM       2022-08-10 10:19:00 [facility]

 

                BASIC METABOLIC PANEL 2022-08-10 10:19:00 Edwardo Mcclelland [facility]

[facility] [place]



                (NA, K, CL, CO2, GLUCOSE,                 Robin         Medica

l Branch



                BUN, CREATININE, CA)                                 

 

                CBC WITH DIFF   2022-08-10 10:19:00 [facility]

 

                MAGNESIUM       2022-08-10 10:19:00 [facility]

 

                BASIC METABOLIC PANEL 2022-08-10 10:19:00 Edwardo Mcclelland [facility]

[facility] [place]



                (NA, K, CL, CO2, GLUCOSE,                 Robin         Medica

l Branch



                BUN, CREATININE, CA)                                 

 

                CBC WITH DIFF   2022-08-10 10:19:00 [facility]

 

                POCT GLUCOSE (AUTOMATED) 2022-08-10 02:44:00 Scooby Sharpe

[facility]

 

                POCT GLUCOSE (AUTOMATED) 2022-08-10 02:44:00 Scooby Sharpe

[facility]

 

                POCT GLUCOSE (AUTOMATED) 2022 22:02:00 Scooby Sharpe

[facility]

 

                POCT GLUCOSE (AUTOMATED) 2022 22:02:00 Scooby Sharpe

[facility]

 

                FUNGUS (ROUTINE) CULTURE 2022 17:03:00 Merry Bonds [facility]

 

                TISSUE          2022 17:03:00 Sapphire Bonds [facility]



                CULTURE(AEROBIC/ANAEROBIC                 A               Medica

l Branch



                )                                               

 

                FUNGUS (ROUTINE) CULTURE 2022 17:03:00 Merry Bonds [facility]

 

                TISSUE          2022 17:03:00 Sapphire Bonds [facility]



                CULTURE(AEROBIC/ANAEROBIC                 A               Medica

l Branch



                )                                               

 

                FUNGUS (ROUTINE) CULTURE 2022 17:00:00 Merry Bonds [facility]

 

                TISSUE          2022 17:00:00 Sapphire Bonds [facility]



                CULTURE(AEROBIC/ANAEROBIC                 A               Medica

l Branch



                )                                               

 

                FUNGUS (ROUTINE) CULTURE 2022 17:00:00 Merry Bonds [facility]

 

                TISSUE          2022 17:00:00 Sapphire Bonds [facility]



                CULTURE(AEROBIC/ANAEROBIC                 A               Medica

l Branch



                )                                               

 

                FUNGUS (ROUTINE) CULTURE 2022 16:59:00 Merry Bonds [facility]

 

                TISSUE          2022 16:59:00 Sapphire Bonds [facility]



                CULTURE(AEROBIC/ANAEROBIC                 A               Medica

l Branch



                )                                               

 

                FUNGUS (ROUTINE) CULTURE 2022 16:59:00 Merry Bonds [facility]

 

                TISSUE          2022 16:59:00 Sapphire Bonds [facility]



                CULTURE(AEROBIC/ANAEROBIC                 A               Medica

l Branch



                )                                               

 

                FOOT DEBRIDEMENT 2022 16:11:00 Sapphire Bonds [facility]               [facility] [place]

 

                FOOT DEBRIDEMENT 2022 16:11:00 Sapphire Bonds [facility]               Medical Branch

 

                COVID-19 (ID NOW RAPID 2022 14:36:00 Matt Briseno [facility]

[facility] [place]



                TESTING)                                        Medical Branch

 

                LAB ONLY COVID  2022 [facility]:36[facility]00 [facility]

 

                COVID-19 (ID NOW RAPID 2022 14:36:00 Matt Briseno [facility]

[facility] [place]



                TESTING)                                        Medical Branch

 

                LAB ONLY COVID  2022 14:36[facility]00 [facility]

 

                POCT GLUCOSE (AUTOMATED) 2022 14:07:00 nA Chambers  [facility]

 

                POCT GLUCOSE (AUTOMATED) 2022 14:07:00 An Chambers  [facility]

 

                HB ECG ROUTINE & RHYTHM 2022 13:27:17 Yoselyn [facility]

 

                CBC WITH DIFF   2022 10:55:00 [facility]

 

                CBC WITH DIFF   2022 10:55:00 [facility]

 

                MAGNESIUM       2022 10:40:00 [facility]

 

                BASIC METABOLIC PANEL 2022 10:40:00 Edwardo Mcclelland [facility]

[facility] [place]



                (NA, K, CL, CO2, GLUCOSE,                 Robin         Medica

l Branch



                BUN, CREATININE, CA)                                 

 

                COVID-19 (ID NOW RAPID 2022 10:40:00 Ankita Crooks    [facility]



                TESTING)                                        Medical Branch

 

                LAB ONLY COVID  2022 10:40:00 Ankita Crooks    [facility]

 

                MAGNESIUM       2022 10:40:00 [facility]

 

                BASIC METABOLIC PANEL 2022 10:40:00 Edwardo Mcclelland Un[facility]

[facility] [place]



                (NA, K, CL, CO2, GLUCOSE,                 Robin         Medica

l Branch



                BUN, CREATININE, CA)                                 

 

                COVID-19 (ID NOW RAPID 2022 10:40:00 Laura Crooksssa    [facility]



                TESTING)                                        Medical Branch

 

                LAB ONLY COVID  2022 10:40:00 Valeriy [facility]



                INTERPRETATION                                  [facility]

 

                POCT GLUCOSE (AUTOMATED) 2022 02:29:00 An Chambers  Uni

[facility]

 

                POCT GLUCOSE (AUTOMATED) 2022 02:29:00 An Chambers  Uni

[facility]

 

                POCT GLUCOSE (AUTOMATED) 2022 23:39:00 An Chambers  Uni

[facility]

 

                POCT GLUCOSE (AUTOMATED) 2022 23:39:00 An Chambers  Uni

[facility]

 

                POCT GLUCOSE (AUTOMATED) 2022 17:10:00 An Chambers  Uni

[facility]

 

                POCT GLUCOSE (AUTOMATED) 2022 17:10:00 An Chambers  Uni

[facility]

 

                POCT GLUCOSE (AUTOMATED) 2022 17:10:00 An Chambers  Uni

[facility]

 

                POCT GLUCOSE (AUTOMATED) 2022 15:54:00 An Chambers  Uni

[facility]

 

                POCT GLUCOSE (AUTOMATED) 2022 15:54:00 An Chambers  Uni

[facility]

 

                POCT GLUCOSE (AUTOMATED) 2022 15:54:00 An Chambers  Uni

[facility]

 

                SEDIMENTATION RATE 2022 08:36:00 Karina Whyte [facility]

 

                BASIC METABOLIC PANEL 2022 [facility]:36:00 [facility]



                (NA, K, CL, CO2, GLUCOSE,                                 Medica

l Branch



                BUN, CREATININE, CA)                                 

 

                CBC WITH DIFF   2022 08:36:00 Zaki [facility]

 

                MAGNESIUM       2022 08:36:00 Zaki [facility]

 

                [facility]       [facility]

 

                BASIC METABOLIC PANEL 2022 08:36:00 Zaki [facility]



                (NA, K, CL, CO2, GLUCOSE,                                 Medica

l Branch



                BUN, CREATININE, CA)                                 

 

                SEDIMENTATION RATE 2022 08:36:00 Isabell [facility]

 

                CBC WITH DIFF   2022 08:36:00 Zaki [facility]

 

                MAGNESIUM       2022 [facility]36[facility]

 

                BASIC METABOLIC PANEL 2022 08:36:00 Zaki [facility]



                (NA, K, CL, CO2, GLUCOSE,                                 Medica

l Branch



                BUN, CREATININE, CA)                                 

 

                SEDIMENTATION RATE 2022 08:36:00 Isabell [facility]

 

                CBC WITH DIFF   2022 08:36:00 Zaki [facility]

 

                POCT GLUCOSE (AUTOMATED) 2022 00:57:00 Yo Law Un

[facility] of 

[facility]

 

                POCT GLUCOSE (AUTOMATED) 2022 00:57:00 Yo Law B Un

iversity of 

[facility]

 

                POCT GLUCOSE (AUTOMATED) 2022 00:57:00 Yo Law Un

iversity of 

[facility]

 

                POCT GLUCOSE (AUTOMATED) 2022 21:26:00 Yo Law B Un

iversity of 

[facility]

 

                POCT GLUCOSE (AUTOMATED) 2022 21:26:00 Yo Law Un

iversity of 

[facility]

 

                POCT GLUCOSE (AUTOMATED) 2022 21:26:00 Yo Law B Un

iversity of 

[facility]

 

                POCT GLUCOSE (AUTOMATED) 2022 16:47:00 Yo Law Un

iversity of 

[facility]

 

                POCT GLUCOSE (AUTOMATED) 2022 16:47:00 Yo Law B Un

iversity of 

[facility]

 

                POCT GLUCOSE (AUTOMATED) 2022 16:47:00 Yo Law Un

iversity of 

[facility]

 

                BASIC METABOLIC PANEL 2022 14:21:00 Carolann Haines [facility]



                (NA, K, CL, CO2, GLUCOSE,                                 Medica

l Branch



                BUN, CREATININE, CA)                                 

 

                C-REACTIVE PROTEIN 2022 14:21:00 [facility]

 

                C-REACTIVE PROTEIN 2022 14:21:00 [facility]

 

                BASIC METABOLIC PANEL 2022 14:21:00 Zaki [facility]



                (NA, K, CL, CO2, GLUCOSE,                                 Medica

l Branch



                BUN, CREATININE, CA)                                 

 

                C-REACTIVE PROTEIN 2022 14:21:00 [facility]

 

                BASIC METABOLIC PANEL 2022 14:21:00 Zaki [facility]



                (NA, K, CL, CO2, GLUCOSE,                                 Medica

l Branch



                BUN, CREATININE, CA)                                 

 

                POCT GLUCOSE (AUTOMATED) 2022 12:34:00 Yo Law Un

iversity of 

[facility]

 

                POCT GLUCOSE (AUTOMATED) 2022 12:34:00 Yo aLw Un

iversity of 

[facility]

 

                POCT GLUCOSE (AUTOMATED) 2022 12:34:00 Yo Law Un

iversity of 

[facility]

 

                POCT GLUCOSE (AUTOMATED) 2022 01:28:00 Yo Law Un

iversity of 

[facility]

 

                POCT GLUCOSE (AUTOMATED) 2022 01:28:00 Yo Law Un

iversity of 

[facility]

 

                POCT GLUCOSE (AUTOMATED) 2022 01:28:00 Yo Law Un

iversity of 

[facility]

 

                POCT GLUCOSE (AUTOMATED) 2022 21:13:00 Yo Law Un

iversity of 

[facility]

 

                POCT GLUCOSE (AUTOMATED) 2022 21:13:00 Yo Law Un

iversity of 

[facility]

 

                POCT GLUCOSE (AUTOMATED) 2022 21:13:00 Yo Law Un

iversity of 

[facility]

 

                POCT GLUCOSE (AUTOMATED) 2022 16:39:00 Yo Law Un

iversity of 

[facility]

 

                POCT GLUCOSE (AUTOMATED) 2022 16:39:00 Yo Law Un

iversity of 

[facility]

 

                POCT GLUCOSE (AUTOMATED) 2022 16:39:00 Yo Law Un

iversity of 

[facility]

 

                POCT GLUCOSE (AUTOMATED) 2022 13:11:00 Yo Law Un

iversity of 

[facility]

 

                POCT GLUCOSE (AUTOMATED) 2022 13:11:00 Yo Law Un

iversity of 

[facility]

 

                POCT GLUCOSE (AUTOMATED) 2022 13:11:00 Yo Law Un

iversity of 

[facility]

 

                MAGNESIUM       2022 10:28:00 Shaq [facility]

 

                BASIC METABOLIC PANEL 2022 10:28:00 [facility]



                (NA, K, CL, CO2, GLUCOSE,                                 Medica

l Branch



                BUN, CREATININE, CA)                                 

 

                MAGNESIUM       2022 10:28:00 [facility]

 

                BASIC METABOLIC PANEL 2022 10:28:00 Butt [facility]



                (NA, K, CL, CO2, GLUCOSE,                                 Medica

l Branch



                BUN, CREATININE, CA)                                 

 

                MAGNESIUM       2022 10:28:00 Shaq [facility]

 

                BASIC METABOLIC PANEL 2022 10:28:00 Butt [facility]



                (NA, K, CL, CO2, GLUCOSE,                                 Medica

l Branch



                BUN, CREATININE, CA)                                 

 

                POCT GLUCOSE (AUTOMATED) 2022 10:01:00 Yo Law Un

iversity of 

[facility]

 

                POCT GLUCOSE (AUTOMATED) 2022 10:01:00 Yo Law Un

iversity of 

[facility]

 

                POCT GLUCOSE (AUTOMATED) 2022 10:01:00 Yo Law Un

iversity of 

[facility]

 

                POCT GLUCOSE (AUTOMATED) 2022 06:32:00 Yo Law Un

iversity of 

[facility]

 

                POCT GLUCOSE (AUTOMATED) 2022 06:32:00 Yo Law Un

iversity of 

[facility]

 

                POCT GLUCOSE (AUTOMATED) 2022 06:32:00 Yo Law Un

iversity of 

[facility]

 

                BASIC METABOLIC PANEL 2022 02:01:00 Shaq rubén Chon [facility]

[facility]



                (NA, K, CL, CO2, GLUCOSE,                                 Medica

l Branch



                BUN, CREATININE, CA)                                 

 

                BASIC METABOLIC PANEL 2022 02:01:00 Butt [facility]

[facility]



                (NA, K, CL, CO2, GLUCOSE,                                 Medica

l Branch



                BUN, CREATININE, CA)                                 

 

                BASIC METABOLIC PANEL 2022 02:01:00 Shaq rubén [facility]

[facility]



                (NA, K, CL, CO2, GLUCOSE,                                 Medica

l Branch



                BUN, CREATININE, CA)                                 

 

                POCT GLUCOSE (AUTOMATED) 2022 01:53:00 Yo Law Un

iversity of 

[facility]

 

                POCT GLUCOSE (AUTOMATED) 2022 01:53:00 Yo Law Un

iversity of 

[facility]

 

                POCT GLUCOSE (AUTOMATED) 2022 01:53:00 Yo Law Un

iversity of 

[facility]

 

                POCT GLUCOSE (AUTOMATED) 2022 23:02:00 Yo Law Un

iversity of 

[facility]

 

                POCT GLUCOSE (AUTOMATED) 2022 23:02:00 Yo Law Un

iversity of 

[facility]

 

                POCT GLUCOSE (AUTOMATED) 2022 23:02:00 Yo Law Un

iversity of 

[facility]

 

                XR FOOT 3+ VW BILATERAL 2022 21:35:00 Sapphire Bonds

 [facility]

 

                XR FOOT 3+ VW BILATERAL 2022 21:35:00 Frank Bondsatosin

 [facility]

 

                XR FOOT 3+ VW BILATERAL 2022 21:35:00 Sapphire Bonds

 [facility]

 

                POCT GLUCOSE (AUTOMATED) 2022 20:45:00 Yo Law Un

iversity of 

[facility]

 

                POCT GLUCOSE (AUTOMATED) 2022 20:45:00 Yo Law Un

iversity of 

[facility]

 

                POCT GLUCOSE (AUTOMATED) 2022 20:45:00 Yo Law Un

iversity of 

[facility]

 

                POCT GLUCOSE (AUTOMATED) 2022 16:41:00 Yo Law Un

iversity of 

[facility]

 

                POCT GLUCOSE (AUTOMATED) 2022 16:41:00 Yo Law Un

iversity of 

[facility]

 

                POCT GLUCOSE (AUTOMATED) 2022 16:41:00 Yo Law Un

iversity of 

[facility]

 

                BASIC METABOLIC PANEL 2022 15:35:00 Yo Law [facility]

[facility] [place]



                (NA, K, CL, CO2, GLUCOSE,                                 Medica

l Branch



                BUN, CREATININE, CA)                                 

 

                BASIC METABOLIC PANEL 2022 15:35:00 Yo Law [facility]

[facility] [place]



                (NA, K, CL, CO2, GLUCOSE,                                 Medica

l Branch



                BUN, CREATININE, CA)                                 

 

                BASIC METABOLIC PANEL 2022 15:35:00 Yo Law [facility]

[facility]



                (NA, K, CL, CO2, GLUCOSE,                                 Medica

l Branch



                BUN, CREATININE, CA)                                 

 

                BETA HYDROXY-BUTYRATE 2022 15:34:00 Hi Collazo     [facility]

 

                BETA HYDROXY-BUTYRATE 2022 15:34:00 Hi Collazo     [facility]

 

                BETA HYDROXY-BUTYRATE 2022 15:34:00 Hi Collazo     [facility]

 

                POCT GLUCOSE (AUTOMATED) 2022 14:20:00 Yo Law Un

iversity of 

[facility]

 

                POCT GLUCOSE (AUTOMATED) 2022 14:20:00 Yo Law Un

iversity of 

[facility]

 

                POCT GLUCOSE (AUTOMATED) 2022 14:20:00 Yo Law Un

iversity of 

[facility]

 

                POCT GLUCOSE (AUTOMATED) 2022 12:52:00 Yo Law Un

iversity of 

[facility]

 

                POCT GLUCOSE (AUTOMATED) 2022 12:52:00 Yo Law Un

iversity of 

[facility]

 

                POCT GLUCOSE (AUTOMATED) 2022 12:52:00 Yo Law Un

iversity of 

[facility]

 

                POCT GLUCOSE (AUTOMATED) 2022 09:04:00 Yo Law Un

iversity of 

[facility]

 

                POCT GLUCOSE (AUTOMATED) 2022 09:04:00 Yo Law Un

iversity of 

[facility]

 

                POCT GLUCOSE (AUTOMATED) 2022 09:04:00 Yo Law Un

iversity of 

[facility]

 

                BASIC METABOLIC PANEL 2022 06:13:00 Skylar Tinajero  [facility]



                (NA, K, CL, CO2, GLUCOSE,                                 Medica

l Branch



                BUN, CREATININE, CA)                                 

 

                GLUTAMIC ACID   2022 06:13:00 Skylar Tinajero  [facility]

 

                CBC WITHOUT DIFF 2022 06:13[facility]00 [facility]

 

                MAGNESIUM       2022 06:13:00 [facility]

 

                MAGNESIUM       2022 06:13:00 Tanvi [facility]

 

                BASIC METABOLIC PANEL 2022 06:13:00 [facility]



                (NA, K, CL, CO2, GLUCOSE,                                 Medica

l Branch



                BUN, CREATININE, CA)                                 

 

                CBC WITHOUT DIFF 2022 06:13:00 [facility]

 

                GLUTAMIC ACID   2022 06:13:00 Tanvi [facility]



                DECARBOXYLASE AB                                 Medical Branch

 

                MAGNESIUM       2022 06:13:00 Tanvi [facility]

 

                BASIC METABOLIC PANEL 2022 06:13:00 [facility]



                (NA, K, CL, CO2, GLUCOSE,                                 Medica

l Branch



                BUN, CREATININE, CA)                                 

 

                CBC WITHOUT DIFF 2022 06:13:00 Tanvi [facility]

 

                GLUTAMIC ACID   2022 06:13:00 Tanvi [facility]



                DECARBOXYLASE AB                                 Medical Branch

 

                POCT GLUCOSE (AUTOMATED) 2022 05:45:00 Yo Law Un

iversity of 

[facility]

 

                POCT GLUCOSE (AUTOMATED) 2022 05:45:00 Yo Law Un

iversity of 

[facility]

 

                POCT GLUCOSE (AUTOMATED) 2022 05:45:00 Yo Law Un

iversity of 

[facility]

 

                POCT GLUCOSE (AUTOMATED) 2022 01:26:00 Yo Law Un

[facility] of 

[place]



                                                                Medical Branch

 

                POCT GLUCOSE (AUTOMATED) 2022 01:26:00 Yo Law Un

[facility] of 

[place]



                                                                Medical Branch

 

                POCT GLUCOSE (AUTOMATED) 2022 01:26:00 Yo Law Un

iversity of 

[facility]

 

                POCT GLUCOSE (AUTOMATED) 2022 23:42:00 Yo Law Un

[facility] of 

[place]



                                                                Medical Branch

 

                POCT GLUCOSE (AUTOMATED) 2022 23:42:00 Yo Law Un

iversity of 

[facility]

 

                POCT GLUCOSE (AUTOMATED) 2022 23:42:00 Yo Law Un

[facility] of 

[place]



                                                                Medical Branch

 

                BASIC METABOLIC PANEL 2022 22:51:00 Yo Law [facility]



                (NA, K, CL, CO2, GLUCOSE,                                 Medica

l Branch



                BUN, CREATININE, CA)                                 

 

                BASIC METABOLIC PANEL 2022 22:51:00 Yo Law [facility]



                (NA, K, CL, CO2, GLUCOSE,                                 Medica

l Branch



                BUN, CREATININE, CA)                                 

 

                BASIC METABOLIC PANEL 2022 22:51:00 Yo Law [facility]



                (NA, K, CL, CO2, GLUCOSE,                                 Medica

l Branch



                BUN, CREATININE, CA)                                 

 

                POCT GLUCOSE (AUTOMATED) 2022 22:37:00 Yo Law Un

[facility] of 

[place]



                                                                Medical Branch

 

                POCT GLUCOSE (AUTOMATED) 2022 22:37:00 Yo Law Un

[facility] of 

[place]



                                                                Medical Branch

 

                POCT GLUCOSE (AUTOMATED) 2022 22:37:00 Yo Law Un

[facility] of 

[place]



                                                                Medical Branch

 

                POCT GLUCOSE (AUTOMATED) 2022 21:30:00 Yo Law Un

[facility] of 

[place]



                                                                Medical Branch

 

                POCT GLUCOSE (AUTOMATED) 2022 21:30:00 Yo Law Un

[facility] of 

[place]



                                                                Medical Branch

 

                POCT GLUCOSE (AUTOMATED) 2022 21:30:00 Yo Law Un

[facility] of 

[place]



                                                                Medical Branch

 

                POCT GLUCOSE (AUTOMATED) 2022 20:05:00 Yo Law Un

[facility] of 

[place]



                                                                Medical Branch

 

                POCT GLUCOSE (AUTOMATED) 2022 20:05:00 Yo Law Un

[facility] of 

[place]



                                                                Medical Branch

 

                POCT GLUCOSE (AUTOMATED) 2022 20:05:00 Yo Law Un

[facility] of 

[place]



                                                                Medical Branch

 

                HB ECG ROUTINE & RHYTHM 2022 19:59:13 Chana New Uni

[facility]

 

                HB ECG ROUTINE & RHYTHM 2022 19:59:13 Chana New Uni

[facility]                                           Medical Branch

 

                HB ECG ROUTINE & RHYTHM 2022 19:59:13 Chana New Uni

[facility]

 

                POCT GLUCOSE (AUTOMATED) 2022 19:06:00 Yo Law Un

[facility] of 

[place]



                                                                Medical Branch

 

                POCT GLUCOSE (AUTOMATED) 2022 19:06:00 Yo Law Un

[facility]

 

                POCT GLUCOSE (AUTOMATED) 2022 19:06:00 Yo Law Un

[facility]

 

                BLOOD CULTURE SCREEN 2022 18:49:00 [facility]

 

                BLOOD CULTURE SCREEN 2022 18:49:00 Haines, [facility]

 

                BLOOD CULTURE SCREEN 2022 18:49:00 Haines, [facility]

 

                BLOOD CULTURE SCREEN 2022 18:48:00 Haines, [facility]

 

                BLOOD CULTURE SCREEN 2022 18:48:00 Haines, [facility]

 

                BLOOD CULTURE SCREEN 2022 18:48:00 Zaki, [facility]

 

                XR CHEST 1 VW   2022 18:34:00 Shaq [facility]

 

                XR CHEST 1 VW   2022 18:34:00 Butt [facility]

 

                XR CHEST 1    2022 18:34:00 Shaq [facility]

 

                BASIC METABOLIC PANEL 2022 17:49:00 Yo Law [facility]



                (NA, K, CL, CO2, GLUCOSE,                                 Medica

l Branch



                BUN, CREATININE, CA)                                 

 

                TROPONIN I      2022 17:49:00 Vale, [facility]

 

                TROPONIN I      2022 17:49:00 Vale, [facility]

 

                BASIC METABOLIC PANEL 2022 17:49:00 Yo Law [facility]

[facility] [place]



                (NA, K, CL, CO2, GLUCOSE,                                 Medica

l Branch



                BUN, CREATININE, CA)                                 

 

                TROPONIN I      2022 17:49:00 Vale, [facility]

 

                BASIC METABOLIC PANEL 2022 17:49:00 Yo Law [facility]

[facility] [place]



                (NA, K, CL, CO2, GLUCOSE,                                 Medica

l Branch



                BUN, CREATININE, CA)                                 

 

                POCT GLUCOSE (AUTOMATED) 2022 17:27:00 Yo Law Un

[facility] of 

[place]



                                                                Medical Branch

 

                POCT GLUCOSE (AUTOMATED) 2022 17:27:00 Yo Law Un

[facility] of 

[place]



                                                                Medical Branch

 

                POCT GLUCOSE (AUTOMATED) 2022 17:27:00 [facility] Un

[facility] 

[place]



                                                                Medical Branch

 

                POCT GLUCOSE (AUTOMATED) 2022 15:53:00 [facility] Law Un

[facility] 

[place]



                                                                Medical Branch

 

                POCT GLUCOSE (AUTOMATED) 2022 15:53:00 Yo Law Un

[facility] of 

[place]



                                                                Medical Branch

 

                POCT GLUCOSE (AUTOMATED) 2022 15:53:00 Yo Law Un

[facility] of 

[place]



                                                                Medical Branch

 

                BASIC METABOLIC PANEL 2022 15:32:00 Yo Law Unive

[facility] [place]



                (NA, K, CL, CO2, GLUCOSE,                                 Medica

l Branch



                BUN, CREATININE, CA)                                 

 

                BASIC METABOLIC PANEL 2022 15:32:00 Yo Law Unive

[facility] [place]



                (NA, K, CL, CO2, GLUCOSE,                                 Medica

l Branch



                BUN, CREATININE, CA)                                 

 

                BASIC METABOLIC PANEL 2022 15:32:00 Yo Law Unive

rsity [facility]



                (NA, K, CL, CO2, GLUCOSE,                                 Medica

l Branch



                BUN, CREATININE, CA)                                 

 

                POCT GLUCOSE (AUTOMATED) 2022 14:56:00 Yo Law Un

[facility] of 

[place]



                                                                Medical Branch

 

                POCT GLUCOSE (AUTOMATED) 2022 14:56:00 Yo Law Un

[facility] of 

[place]



                                                                Medical Branch

 

                POCT GLUCOSE (AUTOMATED) 2022 14:56:00 Yo Law B Un

[facility] of 

[place]



                                                                Medical Branch

 

                POCT GLUCOSE (AUTOMATED) 2022 13:48:00 Yo Law Un

[facility] of 

[place]



                                                                Medical Branch

 

                POCT GLUCOSE (AUTOMATED) 2022 13:48:00 Yo Law Un

iversity of 

[facility]

 

                POCT GLUCOSE (AUTOMATED) 2022 13:48:00 Yo Law Un

iversity of 

[facility]

 

                POCT GLUCOSE (AUTOMATED) 2022 10:42:00 Yo Law Un

iversity of 

[facility]

 

                POCT GLUCOSE (AUTOMATED) 2022 10:42:00 Yo Law Un

iversity of 

[facility]

 

                POCT GLUCOSE (AUTOMATED) 2022 10:42:00 Yo Law Un

iversity [facility]

 

                [facility]IC [facility]OLIC PANEL [facility]



                (NA, K, CL, CO2, GLUCOSE,                                 Medica

l Branch



                BUN, CREATININE, CA)                                 

 

                [facility]       2022 [facility]:14[facility]00 [facility]

 

                MAGNESIUM       2022 10:14:00 [facility]

 

                BASIC METABOLIC PANEL 2022 10:14:00 Haines, [facility]



                (NA, K, CL, CO2, GLUCOSE,                                 Medica

l Branch



                BUN, CREATININE, CA)                                 

 

                MAGNESIUM       2022 [facility]:14:00 [facility]

 

                BASIC METABOLIC PANEL 2022 10:14:00 Haines, [facility]



                (NA, K, CL, CO2, GLUCOSE,                                 Medica

l Branch



                BUN, CREATININE, CA)                                 

 

                POCT GLUCOSE (AUTOMATED) 2022 09:30:00 Yo Law Un

iversity of 

[facility]

 

                POCT GLUCOSE (AUTOMATED) 2022 09:30:00 Yo Law Un

iversity of 

[facility]

 

                POCT GLUCOSE (AUTOMATED) 2022 09:30:00 Yo Law Un

iversity of 

[facility]

 

                POCT GLUCOSE (AUTOMATED) 2022 07:11:00 Yo Law Un

iversity of 

[facility]

 

                POCT GLUCOSE (AUTOMATED) 2022 07:11:00 [facility] Un

[facility]

 

                POCT GLUCOSE (AUTOMATED) 2022 07:11:00 Yo Law Un

[facility]

 

                OSMOLALITY, SERUM OR 2022 07:07:00 Yo Lawer

[facility]

 

                BETA HYDROXY-BUTYRATE 2022 07:07:00 [facility]

 

                OSMOLALITY, SERUM OR 2022 07:07:00 Yo Lawer

[facility]

 

                BETA HYDROXY-BUTYRATE 2022 07:07:00 Yo Law

[facility]

 

                OSMOLALITY, SERUM OR 2022 07:07:00 Yo Lawer

[facility]

 

                BETA HYDROXY-BUTYRATE 2022 07:07:00 Yo Law

[facility]

 

                BASIC METABOLIC PANEL 2022 07:06:00 Yo Law

[facility] [place]



                (NA, K, CL, CO2, GLUCOSE,                                 Medica

l Branch



                BUN, CREATININE, CA)                                 

 

                BASIC METABOLIC PANEL 2022 07:06:00 Yo Law Unive

[facility] [place]



                (NA, K, CL, CO2, GLUCOSE,                                 Medica

l Branch



                BUN, CREATININE, CA)                                 

 

                BASIC METABOLIC PANEL 2022 07:06:00 Yo Law [facility]

[facility] [place]



                (NA, K, CL, CO2, GLUCOSE,                                 Medica

l Branch



                BUN, CREATININE, CA)                                 

 

                CT ABDOMEN PELVIS W 2022 05:28:46 Yo Law Univers

[facility]



                CONTRAST                                        [facility]

 

                CT ABDOMEN PELVIS W 2022 05:28:46 Yo Law Univers

y [facility]



                CONTRAST                                        [facility]

 

                CT ABDOMEN PELVIS W 2022 05:28:46 Yo Law Univers

[facility]



                CONTRAST                                        [facility]

 

                POCT GLUCOSE(AGE >30DAYS) 2022 05:11:00 [facility]

 

                POCT GLUCOSE(AGE >30DAYS) 2022 05:11:00 Yo Law U

[facility]

 

                POCT GLUCOSE(AGE >30DAYS) 2022 05:11:00 Yo Law U

[facility]

 

                POCT GLUCOSE (AUTOMATED) 2022 05:08:00 [facility]

 

                POCT GLUCOSE (AUTOMATED) 2022 05:08:00 [facility] Un

[facility]

 

                POCT GLUCOSE (AUTOMATED) 2022 05:08:00 [facility] Un

[facility]

 

                BLOOD CULTURE SCREEN 2022 04:31:00 Yo Law [facility]

[facility]

 

                BLOOD CULTURE WORKUP 2022 04:31:00 Yo Law [facility]

 

                GRAM POSITIVE BLOOD 2022 04:31:00 Yo Law [facility]



                [facility]



                PROBE-AEROBIC                                   

 

                BLOOD CULTURE SCREEN 2022 04:31:00 Yo Law [facility]

 

                BLOOD CULTURE WORKUP 2022 04:31:00 Yo Law [facility]

 

                GRAM POSITIVE BLOOD 2022 04:31:00 Yo Law [facility]



                [facility]



                PROBE-AEROBIC                                   

 

                BLOOD CULTURE SCREEN 2022 04:31:00 Yo Law Univer

[facility]

 

                BLOOD CULTURE WORKUP 2022 04:31:00 Yo Law Univer

[facility]

 

                GRAM POSITIVE BLOOD 2022 04:31:00 Yo Law [facility]



                [facility]



                PROBE-AEROBIC                                   

 

                URINALYSIS      2022 04:21:00 Yo Law [facility]

 

                URINE CULTURE   2022 04:21:00 Yo Law [facility]

 

                URINALYSIS      2022 04:21:00 Yo Law [facility]

 

                URINE CULTURE   2022 04:21:00 Yo Law [facility]

 

                URINALYSIS      2022 04:21:00 Yo Law [facility]

 

                URINE CULTURE   2022 04:21:00 Yo Law [facility]

 

                COVID-19 (ID NOW RAPID 2022 04:20:00 Thanh Yo B Univ

[facility] [place]



                TESTING)                                        Medical Branch

 

                LAB ONLY COVID  2022 04:20:00 Yo Law [facility]

 

                COVID-19 (ID NOW RAPID 2022 04:20:00 Thanh Yo B [facility]

[facility] [place]



                TESTING)                                        Medical Branch

 

                LAB ONLY COVID  2022 04:20:00 Yo Law [facility]

 

                COVID-19 (ID NOW RAPID 2022 04:20:00 Thanh Yo B Univ

[facility] [place]



                TESTING)                                        Medical Branch

 

                LAB ONLY COVID  2022 04:20:00 Yo Law [facility]

 

                CBC WITH DIFF   2022 04:18:00 Thanh Yo B [facility]

 

                PROTHROMBIN TIME / INR 2022 04:18:00 Thanh [facility]

 

                BASIC METABOLIC PANEL 2022 04:18:00 Yo Law [facility]



                (NA, K, CL, CO2, GLUCOSE,                                 Medica

l Branch



                BUN, CREATININE, CA)                                 

 

                HEPATIC FUNCTION PANEL 2022 04:18:00 Thanh Yo B Univ

[facility] [place]



                ([id number]) (ALB,T.PRO,BILI                                 Medical 

Branch



                T,BU/BC,ALT,AST,ALK PHOS)                                 

 

                LIPASE          2022 04:18:00 Yo Law [facility]

 

                ACUTE CARE VENOUS BLOOD 2022 04:18:00 Yo Law [facility]

 

                LACTIC ACID WHOLE BLOOD 2022 04:18:00 Yo Law

[facility]

 

                MAGNESIUM       2022 04:18:00 Yo Law [facility] [place] [facility]

 

                PHOSPHORUS      2022 04:18:00 Yo Law [facility] [place] [facility]

 

                GLYCOSYLATED HEMOGLOBIN 2022 04:18:00 Yo Law Uni

[facility] [place]



                (A1C)                                           [facility]

 

                PHOSPHORUS      2022 04:18:00 Yo Law [facility]

 

                LIPASE          2022 04:18:00 Yo Law [facility]

 

                MAGNESIUM       2022 04:18:00 Yo Law [facility]

 

                HEPATIC FUNCTION PANEL 2022 04:18:00 Yo Law Univ

[facility] [place]



                ([id number]) (ALB,T.PRO,BILI                                 Medical 

Branch



                T,BU/BC,ALT,AST,ALK PHOS)                                 

 

                BASIC METABOLIC PANEL 2022 04:18:00 Yo Law [facility]



                (NA, K, CL, CO2, GLUCOSE,                                 Medica

l Branch



                BUN, CREATININE, CA)                                 

 

                ACUTE CARE VENOUS BLOOD 2022 04:18:00 Yo Law Uni

[facility] [place]



                GAS                                             [facility]

 

                CBC WITH DIFF   2022 04:18:00 Yo Law [facility]

 

                GLYCOSYLATED HEMOGLOBIN 2022 04:18:00 Yo Law Uni

[facility] [place]



                ([facility])                                           [facility]

 

                PROTHROMBIN TIME / INR 2022 04:18:00 Yo Law [facility]

 

                LACTIC ACID WHOLE BLOOD 2022 04:18:00 Yo Law [facility]

 

                PHOSPHORUS      2022 04:18:00 Yo Law [facility]

 

                LIPASE          2022 04:18:00 Yo Law [facility]

 

                MAGNESIUM       2022 04:18:00 Yo Law [facility]

 

                HEPATIC FUNCTION PANEL 2022 04:18:00 Yo Law Univ

[facility] [place]



                ([id number]) (ALB,T.PRO,BILI                                 Medical 

Branch



                T,BU/BC,ALT,AST,ALK PHOS)                                 

 

                BASIC METABOLIC PANEL 2022 04:18:00 Yo Law [facility]



                (NA, K, CL, CO2, GLUCOSE,                                 Medica

l Branch



                BUN, CREATININE, CA)                                 

 

                ACUTE CARE VENOUS BLOOD 2022 04:18:00 Yo Law Uni

[facility]

 

                CBC WITH DIFF   2022 04:18:00 Yo Law [facility]

 

                GLYCOSYLATED HEMOGLOBIN 2022 04:18:00 Yo Law Uni

[facility] [place]



                (A1C)                                           [facility]

 

                PROTHROMBIN TIME / INR 2022 04:18:00 Yo Law [facility]

 

                LACTIC ACID WHOLE BLOOD 2022 04:18:00 Yo Law Uni

versity of [facility]

 

                EMERGENCY DEPARTMENT 2022 05:01:00 Doctor Unassigned, No U

niversity of 

[facility] [place]

 

                HOSPITAL ADMISSION 2022 05:01:00 Doctor Unassigned, No Uni

versity of [facility]

 

                EMERGENCY DEPARTMENT 2022 05:01:00 Doctor Unassigned, No U

niversity of 

[facility] [place]

 

                HOSPITAL ADMISSION 2022 05:01:00 Doctor Unassigned, No Uni

versity of [facility]

 

                EMERGENCY DEPARTMENT 2022 05:01:00 Doctor Unassigned, No U

niversity of 

[facility] [place]

 

                HOSPITAL ADMISSION 2022 05:01:00 Doctor Unassigned, No Uni

versity of [facility]

 

                POCT GLUCOSE (AUTOMATED) 2022 19:34:00 Rachel Bailey  Uni

versity of [facility]

 

                POCT GLUCOSE (AUTOMATED) 2022 19:34:00 Rachel Bailey  Uni

versity of [facility]

 

                POCT GLUCOSE (AUTOMATED) 2022 16:39:00 Rachel Bailey  Uni

versity of [facility]

 

                POCT GLUCOSE (AUTOMATED) 2022 16:39:00 Leo, Mercy  Uni

[facility]

 

                BASIC METABOLIC PANEL 2022 09:13:00 Livan Lui  Univer

[facility] [place]



                (NA, K, CL, CO2, GLUCOSE,                                 Medica

l Branch



                BUN, CREATININE, CA)                                 

 

                BASIC METABOLIC PANEL 2022 09:13:00 Livan Lui  Univer

[facility] [place]



                (NA, K, CL, CO2, GLUCOSE,                                 Medica

l Branch



                BUN, CREATININE, CA)                                 

 

                POCT GLUCOSE (AUTOMATED) 2022 01:45:00 Leo, Mercy  Taggable

[facility]

 

                POCT GLUCOSE (AUTOMATED) 2022 01:45:00 Leo Mercy  Tagg[employer]

[facility]

 

                POCT GLUCOSE (AUTOMATED) 2022 21:36:00 Leo Mercy  Taggable

[facility]

 

                POCT GLUCOSE (AUTOMATED) 2022 21:36:00 Leo Mercy  Taggable

[facility]

 

                POCT GLUCOSE (AUTOMATED) 2022 16:45:00 Leo Mercy  Tag[employer]

[facility]

 

                POCT GLUCOSE (AUTOMATED) 2022 16:45:00 Leo Mercy  Taggable

[facility]

 

                PHOSPHORUS      2022 13:17:00 Tabitha [facility]

 

                MAGNESIUM       2022 13:17:00 Tabitha [facility]

 

                BASIC METABOLIC PANEL 2022 13:17:00 Kaylynn Lu  Univer

[facility] [place]



                (NA, K, CL, CO2, GLUCOSE,                                 Medica

l Branch



                BUN, CREATININE, CA)                                 

 

                BASIC METABOLIC PANEL 2022 13:17:00 Tabitha Kaylynn  Univer

[facility] [place]



                (NA, K, CL, CO2, GLUCOSE,                                 Medica

l Branch



                BUN, CREATININE, CA)                                 

 

                MAGNESIUM       2022 13:17:00 Tabitha [facility]

 

                PHOSPHORUS      2022 13:17:00 Tabitha [facility]

 

                POCT GLUCOSE (AUTOMATED) 2022 12:42:00 Leo Mercy  Uni

versity of [facility]

 

                POCT GLUCOSE (AUTOMATED) 2022 12:42:00 Leo Mercy  Uni

versity of [facility]

 

                POCT GLUCOSE (AUTOMATED) 2022 01:08:00 Leo Mercy  Uni

versity of [facility]

 

                POCT GLUCOSE (AUTOMATED) 2022 01:08:00 Rachel Bailey  Bella

versity of [facility]

 

                URINALYSIS      2022 22:52:00 [facility]

 

                [facility]   [facility]52:00 [facility]

 

                URINALYSIS      2022 22:52:00 [facility]

 

                URINE CULTURE   2022 22:52:00 [facility]

 

                POCT GLUCOSE (AUTOMATED) 2022 21:40:00 Leo Mercy  Uni

versity of [facility]

 

                POCT GLUCOSE (AUTOMATED) 2022 21:40:00 Leo Mercy  Uni

versity of [facility]

 

                POCT GLUCOSE (AUTOMATED) 2022 16:02:00 Leo Mercy  Uni

versity of [facility]

 

                POCT GLUCOSE (AUTOMATED) 2022 16:02:00 Leo Mercy  Uni

versity of [facility]

 

                POCT GLUCOSE (AUTOMATED) 2022 13:08:00 Leo Mercy  Uni

versity of [facility]

 

                POCT GLUCOSE (AUTOMATED) 2022 13:08:00 Leo Mercy  Uni

versity of [facility]

 

                POCT GLUCOSE (AUTOMATED) 2022 10:58:00 Leo Mercy  Uni

versity of [facility]

 

                POCT GLUCOSE (AUTOMATED) 2022 10:58:00 Leo Mercy  Uni

versity of [facility]

 

                LACTIC ACID WHOLE BLOOD 2022 09:47:00 Refugio Moran [facility]

 

                LACTIC ACID WHOLE BLOOD 2022 09:47:00 Refugio Moran [facility]

 

                PHOSPHORUS      2022 09:46:00 Refugio Moran [facility]

 

                MAGN[facility]IUM       2022 09[facility]46:00 [facility]

 

                COMP. METABOLIC PANEL 2022 09:46:00 Refugio Moran [facility]



                ([id number])                                         [facility]

 

                CBC WITH DIFF   2022 09:46:00 Dean [facility]

 

                CBC WITH DIFF   2022 09:46:00 Refugio Moran [facility]

 

                COMP. METABOLIC PANEL 2022 09:46:00 Dean Refugio [facility]



                ([id number])                                         [facility]

 

                MAGNESIUM       2022 09:46:00 Dean [facility]

 

                PHOSPHORUS      2022 09:46:00 Dean [facility]

 

                POCT GLUCOSE (AUTOMATED) 2022 07:47:00 Rachel Bailey  Uni

[facility]

 

                POCT GLUCOSE (AUTOMATED) 2022 07:47:00 Rachel Bailey  Taggable

[facility]

 

                POCT GLUCOSE (AUTOMATED) 2022 03:40:00 Rachel Bailey  Taggable

[facility]

 

                POCT GLUCOSE (AUTOMATED) 2022 03:40:00 Rachel Bailey  Taggable

[facility]

 

                POCT GLUCOSE (AUTOMATED) 2022 00:43:00 Rachel Bailey  Tagg[employer]

[facility]

 

                POCT GLUCOSE (AUTOMATED) 2022 00:43:00 Rachel Bailey  Taggable

[facility]

 

                BASIC METABOLIC PANEL 2022 23:29:00 Refugio Moran [facility]



                (NA, K, CL, CO2, GLUCOSE,                                 Medica

l Branch



                BUN, CREATININE, CA)                                 

 

                BASIC METABOLIC PANEL 2022 23:29:00 Refugio Moran [facility]



                (NA, K, CL, CO2, GLUCOSE,                                 Medica

l Branch



                BUN, CREATININE, CA)                                 

 

                POCT GLUCOSE (AUTOMATED) 2022 22:28:00 Rachel Bailey  Bella

[facility]

 

                POCT GLUCOSE (AUTOMATED) 2022 22:28:00 Rachel Bailey  Bella

[facility]

 

                US RETROPERITONEAL 2022 22:27:00 Refugio Moran [facility]



                COMPLETE                                        Medical Branch

 

                US RETROPERITONEAL 2022 22:27:00 Refugio Moran [facility]



                COMPLETE                                        [facility]

 

                CT ABDOMEN PELVIS WO 2022 20:05:00 Refugio Moran [facility]



                CONTRAST                                        [facility]

 

                CT ABDOMEN PELVIS WO 2022 20:05:00 Refugio Moran [facility]



                CONTRAST                                        [facility]

 

                POCT GLUCOSE (AUTOMATED) 2022 19:07:00 Leo Swapnay  Taggable

[facility]

 

                POCT GLUCOSE (AUTOMATED) 2022 19:07:00 Leo Swapnay  Taggable

[facility]

 

                POCT GLUCOSE (AUTOMATED) 2022 18:00:00 Leo Mercy  Tagg[employer]

[facility]

 

                POCT GLUCOSE (AUTOMATED) 2022 18:00:00 Rachel Bailey  Taggable

[facility]

 

                PROTEIN CREAT RATIO URINE 2022 17:45:00 Corby Peña 

[facility]



                RANDOM                                          [facility]

 

                PROTEIN CREAT RATIO URINE 2022 17:45:00 Corby Peña 

[facility]



                RANDOM                                          [facility]

 

                POCT GLUCOSE (AUTOMATED) 2022 17:25:00 Leo Mercy  Taggable

[facility]

 

                POCT GLUCOSE (AUTOMATED) 2022 17:25:00 Rachel Bailey  Taggable

[facility]

 

                LACTIC ACID WHOLE BLOOD 2022 17:23:00 Refugio Moran [facility]

 

                LACTIC ACID WHOLE BLOOD 2022 17:23:00 Refugio Moran [facility]

 

                BASIC METABOLIC PANEL 2022 17:22:00 Refugio Moran [facility]



                (NA, K, CL, CO2, GLUCOSE,                                 Medica

l Branch



                BUN, CREATININE, CA)                                 

 

                BASIC METABOLIC PANEL 2022 17:22:00 [facility]



                (NA, K, CL, CO2, GLUCOSE,                                 Medica

l Branch



                BUN, CREATININE, CA)                                 

 

                POCT GLUCOSE (AUTOMATED) 2022 16:07:00 Leo Mercy  [employer] [facility]

 

                POCT GLUCOSE (AUTOMATED) 2022 16:07:00 Edionshraddha Mercy  [employer]

[facility]

 

                POCT GLUCOSE (AUTOMATED) 2022 15:04:00 Edionwe Mercy  [employer]

[facility]

 

                POCT GLUCOSE (AUTOMATED) 2022 15:04:00 Edionshraddha Mercy  [employer]

[facility]

 

                POCT GLUCOSE (AUTOMATED) 2022 13:57:00 Leo Mercy  [employer]

[facility]

 

                POCT GLUCOSE (AUTOMATED) 2022 13:57:00 Leo Mercy  [facility]

 

                URINE CULTURE   2022 13:50:00 [facility]

 

                URINE CULTURE   2022 13:50:00 [facility]

 

                LACTIC ACID WHOLE BLOOD 2022 12:57:00 Leo [facility]

 

                LACTIC ACID WHOLE BLOOD 2022 12:57:00 Leo [facility]

 

                POCT GLUCOSE (AUTOMATED) 2022 12:55:00 [employer]  [employer]

[facility]

 

                POCT GLUCOSE (AUTOMATED) 2022 12:55:00 Edsofia [employer]

[facility]

 

                BASIC METABOLIC PANEL 2022 12:51:00 Leo [facility]



                (NA, K, CL, CO2, GLUCOSE,                                 Medica

l Branch



                BUN, CREATININE, CA)                                 

 

                BASIC METABOLIC PANEL 2022 12:51:00 Leo [facility]



                (NA, K, CL, CO2, GLUCOSE,                                 Medica

l Branch



                BUN, CREATININE, CA)                                 

 

                MRSA / MSSA SCREEN BY 2022 10:37:00 Leo [facility]

 

                MRSA / MSSA SCREEN BY 2022 10:37:00 Leo [facility]

 

                URINE DRUG (IMMUNOASSAY) 2022 10:36:00 Leo [facility] DRUG                                 [facility]



                SCREEN                                          

 

                LACTIC ACID WHOLE BLOOD 2022 10:36:00 Edionshraddha [facility]

 

                URINE DRUG (LCMSMS) - 2022 10:36:00 Edionshraddha [facility]



                SYNTHETIC OPIATES PANEL                                 [facility]

 

                LACTIC ACID WHOLE BLOOD 2022 10:36:00 Leo [facility]

 

                URINE DRUG (IMMUNOASSAY) 2022 10:36:00 Leo [facility] DRUG                                 [facility]



                SCREEN                                          

 

                URINE DRUG (LCMSMS) - 2022 10:36:00 Leo [facility]



                OPIATES PANEL                                   Medical Branch

 

                PHOSPHORUS      2022 08:58:00 [facility]

 

                MAGNESIUM       2022 08:58:00 [facility]

 

                BETA HYDROXY-BUTYRATE 2022 08:58:00 Ashly Ortega  [facility]

 

                BASIC METABOLIC PANEL 2022 08:58:00 [facility]



                (NA, K, CL, CO2, GLUCOSE,                                 Medica

l Branch



                BUN, CREATININE, CA)                                 

 

                BETA HYDROXY-BUTYRATE 2022 08:58:00 Ashly Ortega  [facility]

 

                BASIC METABOLIC PANEL 2022 08:58:00 [facility]



                (NA, K, CL, CO2, GLUCOSE,                                 Medica

l Branch



                BUN, CREATININE, CA)                                 

 

                MAGNESIUM       2022 08:58:00 [facility]

 

                PHOSPHORUS      2022 08:58:00 Edionshraddha, [facility]

 

                CBC WITH DIFF   2022 06:40:00 Leo [facility]

 

                LACTIC ACID WHOLE BLOOD 2022 06:40:00 Leo [facility]

 

                LACTIC ACID WHOLE BLOOD 2022 06:40:00 Leo [facility]

 

                CBC WITH DIFF   2022 06:40:00 Leo [facility]

 

                POCT GLUCOSE (AUTOMATED) 2022 05:14:00 Leo [facility]

 

                POCT GLUCOSE (AUTOMATED) 2022 05:14:00 Leo [facility]

 

                ED BLADDER      2022 04:35:00 Ashly Ortega  [facility]

 

                ED BLADDER      2022 04:35:00 Ashly Ortega  [facility]

 

                POCT GLUCOSE (AUTOMATED) 2022 03:51:00 Leo [facility]

 

                POCT GLUCOSE (AUTOMATED) 2022 03:51:00 Leo [facility]

 

                XR CHEST 1 VW   2022 02:43:07 Ashly Ortega  [facility]

 

                XR ANKLE 3+ VW LEFT 2022 02:43:07 Ashly Ortega  [facility]

 

                XR CHEST 1 VW   2022 02:43:07 Ashly Ortega  [facility]

 

                XR ANKLE 3+ VW LEFT 2022 02:43:07 Ashly Ortega  [facility]

 

                LACTIC ACID WHOLE BLOOD 2022 02:30:00 Ashly Ortega  [facility]

 

                LACTIC ACID WHOLE BLOOD 2022 02:30:00 Ashly Ortega  [facility]

 

                URINALYSIS      2022 01:38:00 Ashly Ortega  [facility]

 

                URINALYSIS      2022 01:38:00 Ashly Ortega  [facility]

 

                POCT GLUCOSE (AUTOMATED) 2022 01:36:00 Ashly Ortega  [facility]

 

                POCT GLUCOSE (AUTOMATED) 2022 01:36:00 Ashly Ortega  [facility]

 

                PHOSPHORUS      2022 01:24:00 Ashly Ortega  [facility]

 

                LIPASE          2022 01:24:00 Ashly Ortega  [facility]

 

                MAGNESIUM       2022 01:24:00 Ashly Ortega  [facility]

 

                TROPONIN I      2022 01:24:00 Ashly Ortega  [facility]

 

                COMP. METABOLIC PANEL 2022 01:24:00 Ashly Ortega  [facility]



                ([id number])                                         [facility]

 

                SALICYLATE      2022 01:24:00 Ashly Ortega  [facility]

 

                ETHANOL         2022 01:24:00 Ashly Ortega  [facility]

 

                COMP. METABOLIC PANEL 2022 01:24:00 Ashly Ortega  [facility]



                ([id number])                                         Medical Branch

 

                LIPASE          2022 01:24:00 Ashly Ortega  [facility]

 

                TROPONIN I      2022 01:24:00 Ashly Ortega  [facility]

 

                ETHANOL         2022 01:24:00 Ashly Ortega  [facility]

 

                ACETAMINOPHEN   2022 01:24:00 Ashly Ortega  [facility]

 

                PHOSPHORUS      2022 01:24:00 Ashly Ortega  [facility]

 

                MAGNESIUM       2022 01:24:00 Ashly Ortega  [facility]

 

                CT LUMBAR SPINE WO 2022 01:16:52 Ashly Ortega  [facility]



                CONTRAST                                        [facility]

 

                CT THORACIC SPINE WO 2022 01:16:52 Ashly Ortega  [facility]



                CONTRAST                                        [facility]

 

                CT THORACIC SPINE WO 2022 01:16:52 Ashly Ortega  [facility]

ity [facility]



                CONTRAST                                        Medical Branch

 

                CT LUMBAR SPINE WO 2022 01:16:52 [facility]

[facility] [place]



                CONTRAST                                        Medical Branch

 

                CT CERVICAL SPINE WO 2022 01:16:24 Ashly Ortega  [facility]

[facility] [place]



                CONTRAST                                        Medical Branch

 

                CT HEAD WO CONTRAST 2022 01:16:24 [facility]  [facility]

[facility] [place]



                                                                Medical Branch

 

                CT HEAD WO CONTRAST 2022 01:16:24 Ashly Ortega  [facility]

[facility] [place]



                                                                Medical Branch

 

                CT CERVICAL SPINE WO 2022 01:16:24 Ashly Ortega  [facility]

ity [facility]



                CONTRAST                                        Medical Branch

 

                BLOOD CULTURE SCREEN 2022 00:41:00 Ashly Ortega  [facility]



                                                                Medical Branch

 

                BLOOD CULTURE SCREEN 2022 00:41:00 Ashly Ortega  [facility]



                                                                Medical Branch

 

                COVID-19 (ID NOW RAPID 2022 00:30:00 Ashly Ortega  [facility]



                TESTING)                                        Medical Branch

 

                LAB ONLY COVID  2022 00:30:00 Ashly Ortega  [facility]



                INTERPRETATION                                  Medical Branch

 

                COVID-19 (ID NOW RAPID 2022 00:30:00 Ashly Ortega  [facility]



                TESTING)                                        Medical Branch

 

                LAB ONLY COVID  2022 00:30:00 Ashly Ortega  [facility]



                INTERPRETATION                                  Medical Branch

 

                POCT GLUCOSE (AUTOMATED) 2022 00:05:00 Ashly Ortega  [facility]

 

                POCT GLUCOSE (AUTOMATED) 2022 00:05:00 Ashly Ortega  [facility]

 

                ACUTE CARE VENOUS BLOOD 2022 23:53:00 Ashly Ortega  [facility]



                GAS                                             [facility]

 

                ACUTE CARE VENOUS BLOOD 2022 23:53:00 Ashly Ortega  [facility]



                GAS                                             [facility]

 

                CBC WITH DIFF   2022 23:48:00 Ashly Ortega  [place] [facility]

[facility]



                                                                Medical Branch

 

                CBC WITH DIFF   2022 23:48:00 Ashly Ortega  [place] [facility]

f [facility]

 

                LACTIC ACID WHOLE BLOOD 2022 23:47:00 Ashly Ortega [facility]

[facility]

 

                LACTIC ACID WHOLE BLOOD 2022 23:47:00 Ashly Ortega [facility]

[facility]

 

                HB ECG ROUTINE & RHYTHM 2022 23:31:40 Shannon [facility]

[facility]

 

                HB ECG ROUTINE & RHYTHM 2022 23:31:40 Shannon [facility]

[facility]

 

                EMERGENCY DEPARTMENT 2022 05:01:00 Doctor Unassigned, No [facility]

[facility] 

[place]



                DOCUMENTS                       [facility]

 

                EMERGENCY DEPARTMENT 2022 05:01:00 Doctor Unassigned, No U

[facility] 

[place]



                DOCUMENTS                       [facility]

 

                HOSPITAL ADMISSION 2022 05:01:00 Doctor Unassigned, No Uni

versity of [facility]

 

                CT FEMUR LEFT W CONTRAST 2022 02:34:07 Chris Sol Uni

versity of [facility]

 

                CT FEMUR LEFT W CONTRAST 2022 02:34:07 Chris Sol [facility]

[facility]

 

                POCT GLUCOSE(AGE >30DAYS) 2022 01:30:00 Chris Sol

iversity [facility]

 

                POCT GLUCOSE(AGE >30DAYS) 2022 01:30:00 Chris Sol

iversity [facility]

 

                POCT GLUCOSE (AUTOMATED) 2022 01:28:00 Alondra Santos 

[facility]

 

                POCT GLUCOSE (AUTOMATED) 2022 01:28:00 Alondra Santos

[facility]

 

                POCT GLUCOSE (AUTOMATED) 2022 00:38:00 Alondra Santos [facility]

 

                POCT GLUCOSE (AUTOMATED) 2022 00:38:00 Alondra Santos [facility]

 

                URINALYSIS      2022-07-15 23:58:00 Alondra Santos [facility]

 

                URINALYSIS      2022-07-15 23:58:00 Alondra Santos [facility]

 

                POCT GLUCOSE (AUTOMATED) 2022-07-15 23:26:00 Alondra Santos

[facility]

 

                POCT GLUCOSE (AUTOMATED) 2022-07-15 23:26:00 Alondra Santos U

[facility]

 

                BASIC METABOLIC PANEL 2022-07-15 22:24:00 Alondra Santos [facility]



                (NA, K, CL, CO2, GLUCOSE,                                 Medica

l Branch



                BUN, CREATININE, CA)                                 

 

                CBC WITH DIFF   2022-07-15 22:24:00 Alondra Santos [facility]

 

                COVID-19 (ID NOW RAPID 2022-07-15 22:24:00 Alondra Santos [facility]



                TESTING)                                        Medical Branch

 

                CBC WITH DIFF   2022-07-15 22:24:00 Alondra Santos [facility]

 

                BASIC METABOLIC PANEL 2022-07-15 22:24:00 Tom [place] [facility]



                (NA, K, CL, CO2, GLUCOSE,                                 Medica

l Branch



                BUN, CREATININE, CA)                                 

 

                COVID-19 (ID NOW RAPID 2022-07-15 22:24:00 Alondra Santos [facility]



                TESTING)                                        Medical Branch

 

                LAB ONLY COVID  2022-07-15 22:24:00 [facility]mo [facility]



                INTERPRETATION                                  [facility]

 

                EMERGENCY SERVICES 2022-07-15 05:01:00 Doctor Unassigned, No [facility]



                AGREEMENTS AND                  Name            [facility]



                AUTHORIZATIONS                                  

 

                EMERGENCY DEPARTMENT 2022-07-15 05:01:00 Doctor Unassigned, No U

[facility]



                DOCUMENTS                       Name            Medical Branch

 

                LIPASE          2022 10:37:00 Radha Fofana [facility]

 

                COMP. METABOLIC PANEL 2022 10:37:00 Radha Fofana [facility]



                ([id number])                                         Medical Branch

 

                CBC WITH DIFF   2022 10:37:00 Radha Fofana [facility]

 

                AC PANEL 21 + LACTIC ACID 2022 10:37:00 Radha Fofana [facility]

 

                CBC WITH DIFF   2022 10:37:00 Radha Fofana [facility]

 

                COMP. METABOLIC PANEL 2022 10:37:00 Radha Fofana [facility]



                ([id number])                                         Medical Branch

 

                AC PANEL 21 + LACTIC ACID 2022 10:37:00 Radha Fofana Un

iversity of 

[facility]

 

                LIPASE          2022 10:37:00 Radha Fofana [place]

[facility]

 

                URINALYSIS      2022 10:24:00 Radha Fofana [facility]

 

                URINALYSIS      2022 10:24:00 Radha Fofana [facility]

 

                EMERGENCY SERVICES 2022 05:01:00 Doctor Unassigned, No Uni

[facility] [place]



                AGREEMENTS AND                  Name            Medical Branch



                AUTHORIZATIONS                                  

 

                POCT GLUCOSE (AUTOMATED) 2022 22:53:00 Radha Fofana Uni

versity of [facility]

 

                POCT GLUCOSE (AUTOMATED) 2022 22:53:00 Radha Fofana Uni

versity of [facility]

 

                POCT GLUCOSE (AUTOMATED) 2022 12:17:00 Radha Fofana Uni

versity of [facility]

 

                POCT GLUCOSE (AUTOMATED) 2022 12:17:00 Radha Fofana Uni

versity of [facility]

 

                POCT GLUCOSE (AUTOMATED) 2022 04:47:00 Radha Fofana Uni

versity of [facility]

 

                POCT GLUCOSE (AUTOMATED) 2022 04:47:00 Radha Fofana Uni

versity of [facility]

 

                POCT GLUCOSE (AUTOMATED) 2022 01:43:00 Radha Fofana Uni

versity of [facility]

 

                POCT GLUCOSE (AUTOMATED) 2022 01:43:00 Radha Fofana Uni

versity of [facility]

 

                POCT GLUCOSE (AUTOMATED) 2022 21:51:00 Radha Fofana Uni

versity of [facility]

 

                POCT GLUCOSE (AUTOMATED) 2022 21:51:00 Radha Fofana Uni

[facility] [place]



                                                                Medical Branch

 

                POCT GLUCOSE (AUTOMATED) 2022 17:04:00 Radha Fofana Uni

versity of [facility]

 

                POCT GLUCOSE (AUTOMATED) 2022 17:04:00 Radha Fofana Uni

versity of [facility]

 

                POCT GLUCOSE (AUTOMATED) 2022 13:09:00 Radha Fofana

versity of [facility]

 

                POCT GLUCOSE (AUTOMATED) 2022 13:09:00 Radha Fofana

versity of [facility]

 

                POCT GLUCOSE (AUTOMATED) 2022 08:32:00 Radha Fofana

versity of [facility]

 

                POCT GLUCOSE (AUTOMATED) 2022 08:32:00 Radha Fofana

versity of [facility]

 

                POCT GLUCOSE (AUTOMATED) 2022 05:45:00 Radha Fofana

[facility]ity of [facility]

 

                POCT GLUCOSE (AUTOMATED) 2022 05:45:00 Radha Fofana

[facility] of [facility]

 

                POCT GLUCOSE (AUTOMATED) 2022 02:43:00 Radha Fofana

versity of [facility]

 

                POCT GLUCOSE (AUTOMATED) 2022 02:43:00 Radha Fofana

versity of [facility]

 

                POCT GLUCOSE (AUTOMATED) 2022 22:37:00 Radha Fofana

vers[facility] of [facility]

 

                POCT GLUCOSE (AUTOMATED) 2022 22:37:00 Radha Fofana

vers[facility] of [facility]

 

                POCT GLUCOSE (AUTOMATED) 2022 18:04:00 Radha Fofana

[facility] of [facility]

 

                POCT GLUCOSE (AUTOMATED) 2022 18:04:00 Radha Fofana

[facility] of [facility]

 

                BASIC METABOLIC PANEL 2022 14:22:00 [facility]



                (NA, K, CL, CO2, GLUCOSE,                                 Medica

l Branch



                BUN, CREATININE, CA)                                 

 

                BASIC METABOLIC PANEL 2022 14:22:00 [facility]



                (NA, K, CL, CO2, GLUCOSE,                                 Medica

l Branch



                BUN, CREATININE, CA)                                 

 

                POCT GLUCOSE (AUTOMATED) 2022 13:30:00 Radha Fofana

versity of [facility]

 

                POCT GLUCOSE (AUTOMATED) 2022 13:30:00 Radha Fofana

versity of [facility]

 

                CRITICAL CARE   2022 11:11:00 Radha Fofana o

[facility]

 

                CRITICAL CARE   2022 11:11:00 Radha Fofana [facility]

 

                POCT GLUCOSE (AUTOMATED) 2022 11:01:00 Radha Fofana

[facility]

 

                POCT GLUCOSE (AUTOMATED) 2022 11:01:00 Radha Fofana [facility]

 

                POCT GLUCOSE (AUTOMATED) 2022 07:19:00 Radha Fofana

[facility]

 

                POCT GLUCOSE (AUTOMATED) 2022 07:19:00 Radha Fofana

[facility]

 

                BASIC METABOLIC PANEL 2022 05:53:00 Radha Fofana [facility]



                (NA, K, CL, CO2, GLUCOSE,                                 Medica

l Branch



                BUN, CREATININE, CA)                                 

 

                BASIC METABOLIC PANEL 2022 05:53:00 Radha Fofana [facility]



                (NA, K, CL, CO2, GLUCOSE,                                 Medica

l Branch



                BUN, CREATININE, CA)                                 

 

                POCT GLUCOSE (AUTOMATED) 2022 04:41:00 Radha Fofana [facility]

 

                POCT GLUCOSE (AUTOMATED) 2022 04:41:00 Radha Fofana [facility]

 

                URINALYSIS      2022 03:59:00 Radha Fofana [facility]

 

                URINE CULTURE   2022 03:59:00 Radha Fofana [facility]

 

                URINALYSIS      2022 03:59:00 Radha Fofana [facility]

 

                URINE CULTURE   2022 03:59:00 Radha Fofana [facility]

 

                POCT GLUCOSE (AUTOMATED) 2022 03:48:00 Radha Fofana

[facility]

 

                POCT GLUCOSE (AUTOMATED) 2022 03:48:00 Radha Fofana

[facility]

 

                AC PANEL 21 + LACTIC ACID 2022 02:23:00 Radha Fofana [facility]

 

                AC PANEL 21 + LACTIC ACID 2022 02:23:00 [facility] 

[facility]

 

                LIPASE          2022 02:22:00 Radha Fofana [facility]

 

                COMP. METABOLIC PANEL 2022 02:22:00 Radha Fofana [facility]



                ([id number])                                         Medical Branch

 

                CBC WITH DIFF   2022 02:22:00 Radha Fofana [facility]

 

                COVID-19 (ID NOW RAPID 2022 02:22:00 Radha Fofana [facility]



                TESTING)                                        Medical Branch

 

                LAB ONLY COVID  2022 02:22:00 Radha Fofana [facility]



                INTERPRETATION                                  Medical Branch

 

                CBC WITH DIFF   2022 02:22:00 Brigido Radha [facility]

 

                COMP. METABOLIC PANEL 2022 02:22:00 Radha Fofana [facility]



                ([id number])                                         Medical Branch

 

                LIPASE          2022 02:22:00 Radha Fofana [facility]

 

                COVID-19 (ID NOW RAPID 2022 02:22:00 Radha Fofana [facility]



                TESTING)                                        Medical Branch

 

                LAB ONLY COVID  2022 02:22:00 Radha Fofana [facility]

 

                HOSPITAL ADMISSION 2022 05:01:00 Doctor Unassigned, No Uni

versity of [facility]

 

                EMERGENCY DEPARTMENT 2022 05:01:00 Doctor Unassigned, No U

niversity of 

[facility]

 

                HOSPITAL ADMISSION 2022 05:01:00 Doctor Unassigned, No Uni

versity of [facility]

 

                POCT GLUCOSE (AUTOMATED) 2022 17:03:00 Edwardo Lopez   Uni

versity of [facility]

 

                POCT GLUCOSE (AUTOMATED) 2022 16:09:00 Edwardo Lopez   Uni

versity of [facility]

 

                HEPATIC FUNCTION PANEL 2022 09:01:00 [facility]

[facility]



                ([id number]) (ALB,T.PRO,BILI                                 Medical 

Branch



                T,BU/BC,ALT,AST,ALK PHOS)                                 

 

                BASIC METABOLIC PANEL 2022 09:01:00 [facility] 

[facility]



                (NA, K, CL, CO2, GLUCOSE,                                 Medica

l Branch



                BUN, CREATININE, CA)                                 

 

                CBC WITH DIFF   2022 09:01:00 Ashly Lees [facility]

 

                CBC WITH DIFF   2022 09:01:00 Ashly Lees [facility]

 

                BASIC METABOLIC PANEL 2022 09:01:00 Ashly Lees [facility]



                (NA, K, CL, CO2, GLUCOSE,                                 Medica

l Branch



                BUN, CREATININE, CA)                                 

 

                HEPATIC FUNCTION PANEL 2022 09:01:00 Ashly Lees [facility]



                ([id number]) (ALB,T.PRO,BILI                                 Medical 

Branch



                T,BU/BC,ALT,AST,ALK PHOS)                                 

 

                POCT GLUCOSE (AUTOMATED) 2022 08:58:00 Edwardo Lopez   [facility]

 

                POCT GLUCOSE (AUTOMATED) 2022 06:53:00 Edwardo Lopez   [facility]

 

                POCT GLUCOSE (AUTOMATED) 2022 05:31:00 Edwardo Lopez

[facility]

 

                POCT GLUCOSE (AUTOMATED) 2022 02:16:00 Edwardo Lopez   [facility]

 

                POCT GLUCOSE (AUTOMATED) 2022 21:50:00 Edwardo Lopez   [facility]

 

                BASIC METABOLIC PANEL 2022 20:45:00 Alfredille, Kaylynn  Univer

[facility] [place]



                (NA, K, CL, CO2, GLUCOSE,                                 Medica

l Branch



                BUN, CREATININE, CA)                                 

 

                CBC WITH DIFF   2022 20:45:00 [facility]

 

                BASIC METABOLIC PANEL 2022 20:45:00 [facility]



                (NA, K, CL, CO2, GLUCOSE,                                 Medica

l Branch



                BUN, CREATININE, CA)                                 

 

                CBC [facility] DIFF   2022 [facility]:45[facility]00 [facility]

 

                POCT GLUCOSE (AUTOMATED) 2022 16:44:00 Edwardo Lopez   [facility]

 

                POCT GLUCOSE (AUTOMATED) 2022 13:41:00 Edwardo Lopez

[facility]

 

                URINE CULTURE   2022 06:41:00 Josse Giles [facility]

 

                URINE CULTURE   2022 06:41:00 Josse Giles [facility]

 

                POCT GLUCOSE (AUTOMATED) 2022 06:10:00 Josse Giles [facility]

 

                CT ABDOMEN PELVIS W 2022 05:08:00 Josse Giles [facility]



                CONTRAST                                        [facility]

 

                CT ABDOMEN PELVIS W 2022 05:08:00 Josse Giles [facility]



                CONTRAST                                        [facility]

 

                URINALYSIS      2022 04:34:00 Josse Giles [facility]

 

                COVID-19 (ID NOW RAPID 2022 04:34:00 Josse Giles [facility]



                TESTING)                                        Medical Branch

 

                LAB ONLY COVID  2022 04:34:00 Josse iGles [facility]



                INTERPRETATION                                  [facility]

 

                URINALYSIS      2022 04:34:00 Josse Giles [facility]

 

                COVID-19 (ID NOW RAPID 2022 04:34:00 Josse Giles [facility]



                TESTING)                                        Medical Branch

 

                LAB ONLY COVID  2022 04:34:00 Josse Giles [facility]



                INTERPRETATION                                  [facility]

 

                LIPASE          2022 03:57:00 Josse Giles [facility]

 

                COMP. METABOLIC PANEL 2022 03:57:00 [facility]

[facility]



                ([id number])                                         [facility]

 

                CBC WITH DIFF   2022 03:57:00 Josse Giles [facility]

 

                CBC WITH DIFF   2022 03:57:00 Josse Giles [facility]

 

                COMP. METABOLIC PANEL 2022 03:57:00 [facility]

[facility]



                ([id number])                                         [facility]

 

                LIPASE          2022 03:57:00 Josse Giles [facility]

 

                EMERGENCY DEPARTMENT 2022 05:01:00 Doctor Unassigned, No U

[facility]



                DOCUMENTS                       Name            [facility] Branch

 

                HOSPITAL ADMISSION 2022 05:01:00 Doctor Unassigned, No Uni

versity of [facility]

 

                POCT GLUCOSE (AUTOMATED) 2022-06-10 21:48:00 Edsofia, Mercy  Uni

versity of [facility]

 

                POCT GLUCOSE (AUTOMATED) 2022-06-10 17:03:00 Edsofia, Mercy  Uni

[facility] of [facility]

 

                POCT GLUCOSE (AUTOMATED) 2022-06-10 17:03:00 Edionshraddha Mercy  Uni

[facility] of [facility]

 

                POCT GLUCOSE (AUTOMATED) 2022-06-10 12:44:00 Edionwe, Mercy  Uni

[facility] of [facility]

 

                POCT GLUCOSE (AUTOMATED) 2022-06-10 12:44:00 Edsofia, Mercy  Uni

[facility]ity of [facility]

 

                BASIC METABOLIC PANEL 2022-06-10 10:29:00 Edwardo Lopez   [facility]



                (NA, K, CL, CO2, GLUCOSE,                                 Medica

l Branch



                BUN, CREATININE, CA)                                 

 

                CBC WITH DIFF   2022-06-10 10:29:00 [facility]

 

                MAGNESIUM       2022-06-10 10:29:00 [facility]

 

                MAGNESIUM       2022-06-10 10:29:00 [facility]

 

                BASIC METABOLIC PANEL 2022-06-10 10:29:00 Edwardo Lopez   [facility]



                (NA, K, CL, CO2, GLUCOSE,                                 Medica

l Branch



                BUN, CREATININE, CA)                                 

 

                CBC WITH DIFF   2022-06-10 10:29:00 [facility]

 

                POCT GLUCOSE (AUTOMATED) 2022-06-10 10:28:00 Edsofia Mercy  Uni

[facility] of [facility]

 

                POCT GLUCOSE (AUTOMATED) 2022-06-10 10:28:00 Edsofia Mercy  Uni

[facility]

 

                POCT GLUCOSE (AUTOMATED) 2022-06-10 05:33:00 Edsofia Mercy  Uni

[facility]

 

                POCT GLUCOSE (AUTOMATED) 2022-06-10 05:33:00 Edionwe, Mercy  Uni

[facility]

 

                POCT GLUCOSE (AUTOMATED) 2022-06-10 04:37:00 Edionwe, Mercy  Uni

[facility]

 

                POCT GLUCOSE (AUTOMATED) 2022-06-10 04:37:00 EdsofiaSwapnay  Uni

versity of [facility]

 

                POCT GLUCOSE (AUTOMATED) 2022-06-10 02:29:00 Edsofia Mercy  Uni

[facility] [place]



                                                                Medical Branch

 

                POCT GLUCOSE (AUTOMATED) 2022-06-10 02:29:00 EdsofiaSwapnay  Uni

[facility] [place]



                                                                Medical Branch

 

                POCT GLUCOSE (AUTOMATED) 2022-06-10 00:52:00 Edionshraddha Mercy  Uni

[facility] [place]



                                                                Medical Branch

 

                POCT GLUCOSE (AUTOMATED) 2022-06-10 00:52:00 Edionshraddha, Mercy  Uni

versity of [facility]

 

                POCT GLUCOSE (AUTOMATED) 2022 21:52:00 Edsofia Mercy  Uni

versity of [facility]

 

                POCT GLUCOSE (AUTOMATED) 2022 21:52:00 Edsofia Mercy  Uni

[facility] of [facility]

 

                POCT GLUCOSE (AUTOMATED) 2022 16:42:00 Edionshraddha Mercy  Uni

[facility]

 

                POCT GLUCOSE (AUTOMATED) 2022 16:42:00 Edsofia Mercy  Uni

[facility]

 

                XR CHEST 1 VW   2022 14:05:00 Edwardo Lopez   [facility]

 

                XR SKULL <4 VW  2022 14:05:00 [facility]

 

                XR ABDOMEN 2 VW 2022 14:05:00 Edwardo Lopez   [facility] Branch

 

                XR ABDOMEN 2 VW 2022 14:05:00 Edwardo Lopez   [facility]

 

                XR CHEST 1 VW   2022 14:05:00 [facility]

 

                XR SKULL <4 VW  2022 14:05:00 Edwardo Lopez   [facility]

 

                POCT GLUCOSE (AUTOMATED) 2022 12:47:00 Edsofia, Mercy  Uni

[facility]ity of [facility]

 

                POCT GLUCOSE (AUTOMATED) 2022 12:47:00 Edsofia, Mercy  Uni

versity of [facility]

 

                POCT GLUCOSE (AUTOMATED) 2022 08:59:00 Edsofia Mercy  Uni

versity [facility]

 

                POCT GLUCOSE (AUTOMATED) 2022 08:59:00 [facility]

 

                GLYCOSYLATED HEMOGLOBIN 2022 08:56:00 Refugio Moran [facility]



                (A1C)                                           [facility] [place]

 

                CBC WITH DIFF   2022 08:56:00 Dean [facility]

 

                BASIC METABOLIC PANEL 2022 08:56:00 Refugio Moran [facility]



                (NA, K, CL, CO2, GLUCOSE,                                 Medica

l Branch



                BUN, CREATININE, CA)                                 

 

                MAGNESIUM       2022 08:56:00 Dean [facility]

 

                PHOSPHORUS      2022 08:56:00 Dean [facility]

 

                PHOSPHORUS      2022 08:56:00 Dean [facility]

 

                MAGNESIUM       2022 08:56:00 Dean [facility]

 

                BASIC METABOLIC PANEL 2022 08:56:00 Refugio Moran [facility]



                (NA, K, CL, CO2, GLUCOSE,                                 Medica

l Branch



                BUN, CREATININE, CA)                                 

 

                CBC WITH DIFF   2022 08:56:00 Dean [facility]

 

                GLYCOSYLATED HEMOGLOBIN 2022 [facility]:56:00 [facility]



                (A1C)                                           [facility]

 

                POCT GLUCOSE (AUTOMATED) 2022 04:27:00 Rachel Bailey  Uni

[facility]

 

                POCT GLUCOSE (AUTOMATED) 2022 04:27:00 Rachel Bailey  Uni

[facility]

 

                POCT GLUCOSE (AUTOMATED) 2022 01:11:00 Rachel Bailey  Uni

versity [facility]

 

                POCT GLUCOSE (AUTOMATED) 2022 01:11:00 Rachel Bailey  Uni

versity [facility]

 

                POCT GLUCOSE (AUTOMATED) 2022 21:02:00 EdRachel black  Uni

versity [facility]

 

                POCT GLUCOSE (AUTOMATED) 2022 21:02:00 Rachel Bailey  Uni

[facility]

 

                POCT GLUCOSE (AUTOMATED) 2022 16:08:00 Edionshraddha Mercy  [facility]

[facility]

 

                POCT GLUCOSE (AUTOMATED) 2022 16:08:00 Edsofia, Mercy  [employer]

[facility]

 

                POCT GLUCOSE (AUTOMATED) 2022 12:39:00 [facility]

 

                POCT GLUCOSE (AUTOMATED) 2022 12:39:00 [facility]sofia [facility]

 

                LACTIC ACID WHOLE BLOOD 2022 09:25:00 Octavio [facility]

 

                CBC WITH DIFF   2022 09:25:00 [facility]

 

                BASIC METABOLIC PANEL 2022 [facility]:00 [facility]



                (NA, K, CL, CO2, GLUCOSE,                                 Medica

l Branch



                BUN, CREATININE, CA)                                 

 

                BASIC METABOLIC PANEL 2022 09:25:00 [facility]



                (NA, K, CL, CO2, GLUCOSE,                                 Medica

l Branch



                BUN, CREATININE, CA)                                 

 

                CBC WITH DIFF   2022 09:25:00 [facility]

 

                LACTIC ACID WHOLE BLOOD 2022 09:25:00 Octavio [facility]

 

                POCT GLUCOSE (AUTOMATED) 2022 08:56:00 Leo Mercy  [employer]

[facility]

 

                POCT GLUCOSE (AUTOMATED) 2022 08:56:00 Leo [facility]  [employer]

[facility]

 

                POCT GLUCOSE (AUTOMATED) 2022 04:51:00 [facility]sofia [facility]

 

                POCT GLUCOSE (AUTOMATED) 2022 04:51:00 Leo [facility]

 

                URINALYSIS      2022 02:47:00 Octavio [facility]

 

                URINALYSIS      2022 02:47:00 Octavio [facility]

 

                POCT GLUCOSE (AUTOMATED) 2022 02:29:00 [facility]

 

                POCT GLUCOSE (AUTOMATED) 2022 02:29:00 Shelton Cunningham [facility]

 

                HB ECG ROUTINE & RHYTHM 2022 00:33:38 Octavio [facility]

 

                HB ECG ROUTINE & RHYTHM 2022 00:33:38 Octavio [facility]

 

                URINALYSIS      2022 00:28:00 Octavio [facility]

 

                URINE CULTURE   2022 00:28:00 Octavio [facility]

 

                URINALYSIS      2022 00:28:00 Octavio [facility]

 

                URINE CULTURE   2022 00:28:00 Octavio [facility]

 

                CBC WITH DIFF   2022 00:25:00 Octavio [facility]

 

                COMP. METABOLIC PANEL 2022 00:25:00 Shelton Cunningham [facility]



                ([id number])                                         Medical Branch

 

                LIPASE          2022 00:25:00 Octavio [facility]

 

                LACTIC ACID WHOLE BLOOD 2022 00:25:00 Octavio [facility]

 

                ACUTE CARE VENOUS BLOOD 2022 00:25:00 Octavio [facility]

 

                BLOOD CULTURE SCREEN 2022 00:25:00 Shelton Cunningham [facility]

 

                BLOOD CULTURE SCREEN 2022 00:25:00 Shelton Cunningham [facility]

 

                LIPASE          2022 00:25:00 Octavio [facility]

 

                COMP. METABOLIC PANEL 2022 00:25:00 Shelton Cunningham [facility]



                ([id number])                                         [facility]

 

                ACUTE CARE VENOUS BLOOD 2022 00:25:00 Octavio [facility]

 

                CBC WITH DIFF   2022 00:25:00 Vincent, Shinta [facility]

 

                LACTIC ACID WHOLE BLOOD 2022 00:25:00 Shelton Cunningham

[facility]

 

                EMERGENCY DEPARTMENT 2022 05:01:00 Doctor Unassigned, No U

[facility] 

[place]



                DOCUMENTS                       [facility]

 

                HOSPITAL ADMISSION 2022 05:01:00 Doctor Unassigned, No Uni

versity of [facility]

 

                CBC WITH DIFF   2022 17:57:00 Alan [facility]

 

                BASIC METABOLIC PANEL 2022 [facility]:57[facility]00 [facility]



                (NA, K, CL, CO2, GLUCOSE,                                 Medica

l Branch



                BUN, CREATININE, CA)                                 

 

                MAGNESIUM       2022 17:57:00 Phillips, [facility]

 

                MAGNESIUM       2022 [facility]:57:00 [facility]

 

                BASIC METABOLIC PANEL 2022 17:57:00 Alan [facility]



                (NA, K, CL, CO2, GLUCOSE,                                 Medica

l Branch



                BUN, CREATININE, CA)                                 

 

                CBC WITH DIFF   2022 17:57:00 Alan [facility]

 

                POCT GLUCOSE (AUTOMATED) 2022 16:49:00 Terminella, Efrain U

niversity [facility]

 

                POCT GLUCOSE (AUTOMATED) 2022 16:49:00 Terminella, Efrain U

niversity [facility]

 

                POCT GLUCOSE (AUTOMATED) 2022 12:56:00 Terminella, Efrain U

niversity [facility]

 

                POCT GLUCOSE (AUTOMATED) 2022 12:56:00 Terminella, Efrain U

niversity [facility]

 

                POCT GLUCOSE (AUTOMATED) 2022 09:41:00 Terminella, Efrain U

niversity [facility]

 

                POCT GLUCOSE (AUTOMATED) 2022 09:41:00 Terminella, Efrain U

niversity [facility]

 

                POCT GLUCOSE (AUTOMATED) 2022 05:08:00 Terminella, Efrain U

niversity [facility]

 

                POCT GLUCOSE (AUTOMATED) 2022 05:08:00 Terminella, Efrain U

niversity of 

[place]



                                                                Medical Branch

 

                POCT GLUCOSE (AUTOMATED) 2022 01:26:00 Terminella, Efrain U

niversity of 

[place]



                                                                Medical Branch

 

                POCT GLUCOSE (AUTOMATED) 2022 01:26:00 Terminella, Efrain U

niversity of 

[facility]

 

                POCT GLUCOSE (AUTOMATED) 2022 21:41:00 Terminella, Efrain U

niversity of 

[place]



                                                                Medical Branch

 

                POCT GLUCOSE (AUTOMATED) 2022 21:41:00 Terminella, Efrain U

niversity of 

[facility]

 

                POCT GLUCOSE (AUTOMATED) 2022 17:07:00 Terminella, Efrain U

niversity of 

[facility]

 

                POCT GLUCOSE (AUTOMATED) 2022 17:07:00 Terminella, Efrain U

niversity of 

[facility]

 

                POCT GLUCOSE (AUTOMATED) 2022 14:01:00 Terminella, Efrain U

niversity of 

[facility]

 

                POCT GLUCOSE (AUTOMATED) 2022 14:01:00 Terminella, Efrain U

niversity of 

[facility]

 

                POCT GLUCOSE (AUTOMATED) 2022 09:18:00 Terminella, Efrain U

niversity of 

[facility]

 

                POCT GLUCOSE (AUTOMATED) 2022 09:18:00 Terminella, Efrain U

niversity of 

[facility]

 

                POCT GLUCOSE (AUTOMATED) 2022 01:43:00 Terminella, Efrain U

niversity of 

[place]



                                                                Medical Branch

 

                POCT GLUCOSE (AUTOMATED) 2022 01:43:00 Terminella, Efrain U

niversity of 

[facility]

 

                POCT GLUCOSE (AUTOMATED) 2022 21:24:00 Albustami, Tobias Uni

versity of [facility]

 

                POCT GLUCOSE (AUTOMATED) 2022 21:24:00 Albustami, Tobias Uni

versity of [facility]

 

                POCT GLUCOSE (AUTOMATED) 2022 16:25:00 Albustami, Tobias Uni

versity of [facility]

 

                POCT GLUCOSE (AUTOMATED) 2022 16:25:00 AlbTobias gupta Uni

[facility]

 

                URINALYSIS      2022 15:46:00 Alan [facility]

 

                URINE CULTURE   2022 15:46:00 Alan [facility]

 

                URINALYSIS      2022 15:46:00 Alan [facility]

 

                URINE CULTURE   2022 15:46:00 Alan [facility]

 

                POCT GLUCOSE (AUTOMATED) 2022 15:36:00 AlbTobias gupta Uni

versity [facility]

 

                POCT GLUCOSE (AUTOMATED) 2022 15:36:00 AlbTobias gupta Uni

[facility]

 

                POCT GLUCOSE (AUTOMATED) 2022 14:25:00 AlbTobias gupta Uni

[facility]

 

                POCT GLUCOSE (AUTOMATED) 2022 14:25:00 AlbTobias gupta Uni

[facility]

 

                BASIC METABOLIC PANEL 2022 13:45:00 Albdamianami, Tobias Univer

[facility] [place]



                (NA, K, CL, CO2, GLUCOSE,                                 Medica

l Branch



                BUN, CREATININE, CA)                                 

 

                BASIC METABOLIC PANEL 2022 13:45:00 Albdamianami, Tobias Un[facility]

[facility] [place]



                (NA, K, CL, CO2, GLUCOSE,                                 Medica

l Branch



                BUN, CREATININE, CA)                                 

 

                POCT GLUCOSE (AUTOMATED) 2022 13:34:00 Alb[facility]ias [facility]

 

                POCT GLUCOSE (AUTOMATED) 2022 13:34:00 AlbdamianamiTobias Uni

[facility]

 

                CRITICAL CARE   2022 13:05:05 Radha Fofana [facility]

 

                CRITICAL CARE   2022 13:05:05 Brigido [facility]

 

                POCT GLUCOSE (AUTOMATED) 2022 12:23:00 AlbTobias gupta [facility]

[facility]

 

                POCT GLUCOSE (AUTOMATED) 2022 12:23:00 AlbdamianamiTobias Uni

versity [facility]

 

                POCT GLUCOSE (AUTOMATED) 2022 11:24:00 AlbTobias gupta Uni

versity of [facility]

 

                POCT GLUCOSE (AUTOMATED) 2022 11:24:00 AlbTobias gupta Uni

versity of [facility]

 

                POCT GLUCOSE (AUTOMATED) 2022 10:33:00 AlbTobias gupta Uni

versity of [facility]

 

                POCT GLUCOSE (AUTOMATED) 2022 10:33:00 AlbTobias gupta Uni

versity of [facility]

 

                BASIC METABOLIC PANEL 2022 09:32:00 AlbTobias gupta Univer

[facility] [place]



                (NA, K, CL, CO2, GLUCOSE,                                 Medica

l Branch



                BUN, CREATININE, CA)                                 

 

                BASIC METABOLIC PANEL 2022 09:32:00 AlbTobias gupta [facility]



                (NA, K, CL, CO2, GLUCOSE,                                 Medica

l Branch



                BUN, CREATININE, CA)                                 

 

                POCT GLUCOSE (AUTOMATED) 2022 09:30:00 AlbTobias gupta Uni

versity of [facility]

 

                POCT GLUCOSE (AUTOMATED) 2022 09:30:00 AlbustamiTobias Uni

versity of [facility]

 

                POCT GLUCOSE (AUTOMATED) 2022 08:27:00 AlbustamiTobias Uni

versity of [facility]

 

                POCT GLUCOSE (AUTOMATED) 2022 08:27:00 AlbustamiTobias Uni

versity of [facility]

 

                POCT GLUCOSE (AUTOMATED) 2022 07:23:00 AlbustamiTobias Uni

versity of [facility]

 

                POCT GLUCOSE (AUTOMATED) 2022 07:23:00 AlbustamiTobias Uni

versity of [facility]

 

                POCT GLUCOSE (AUTOMATED) 2022 06:22:00 AlbustamiTobias Uni

versity of [facility]

 

                POCT GLUCOSE (AUTOMATED) 2022 06:22:00 AlbustamiTobias Uni

versity of [facility]

 

                BASIC METABOLIC PANEL 2022 05:30:00 AlbTobias gupta [facility]

[facility]



                (NA, K, CL, CO2, GLUCOSE,                                 Medica

l Branch



                BUN, CREATININE, CA)                                 

 

                BASIC METABOLIC PANEL 2022 05:30:00 [facility]



                (NA, K, CL, CO2, GLUCOSE,                                 Medica

l Branch



                BUN, CREATININE, CA)                                 

 

                POCT GLUCOSE (AUTOMATED) 2022 05:27:00 AlbdamianTobias lyles [facility]

 

                POCT GLUCOSE (AUTOMATED) 2022 05:27:00 Albdamianami, [facility]

 

                POCT GLUCOSE (AUTOMATED) 2022 04:23:00 Albustami [facility]

 

                POCT GLUCOSE (AUTOMATED) 2022 04:23:00 Albustami, [facility]

 

                POCT GLUCOSE (AUTOMATED) 2022 03:19:00 Albdamiantrupti [facility]

 

                POCT GLUCOSE (AUTOMATED) 2022 03:19:00 Albcandy [facility]

 

                POCT GLUCOSE (AUTOMATED) 2022 02:24:00 [facility]

 

                POCT GLUCOSE (AUTOMATED) 2022 02:24:00 [facility]

 

                KETONES URINE   2022 01:49:00 [facility]

 

                KETONES URINE   2022 01:49:00 [facility]

 

                BETA HYDROXY-BUTYRATE 2022 01:44:00 [facility]

 

                BASIC METABOLIC PANEL 2022 01:44:00 [facility]



                (NA, K, CL, CO2, GLUCOSE,                                 Medica

l Branch



                BUN, CREATININE, CA)                                 

 

                BETA HYDROXY-BUTYRATE 2022 01:44:00 Phillips, [facility]

 

                BASIC METABOLIC PANEL 2022 01:44:00 [facility]



                (NA, K, CL, CO2, GLUCOSE,                                 Medica

l Branch



                BUN, CREATININE, CA)                                 

 

                POCT GLUCOSE (AUTOMATED) 2022 01:24:00 Tobias Hernandez Bella

versity [facility]

 

                POCT GLUCOSE (AUTOMATED) 2022 01:24:00 Tobias Hernandez Bella

versity of [facility]

 

                POCT GLUCOSE (AUTOMATED) 2022 00:18:00 AlbTobias gupta Bella

versity of [facility]

 

                POCT GLUCOSE (AUTOMATED) 2022 00:18:00 Tobias Hernandez Bella

versity [facility]

 

                POCT GLUCOSE (AUTOMATED) 2022 22:56:00 AlbTobias gupta Bella

versity [facility]

 

                POCT GLUCOSE (AUTOMATED) 2022 22:56:00 Tobias Hernandez Bella

[facility]

 

                BASIC METABOLIC PANEL 2022 22:03:00 Tobias Hernandez Univer

[facility] [place]



                (NA, K, CL, CO2, GLUCOSE,                                 Medica

l Branch



                BUN, CREATININE, CA)                                 

 

                BASIC METABOLIC PANEL 2022 22:03:00 Yong Tobias Univer

[facility] [place]



                (NA, K, CL, CO2, GLUCOSE,                                 Medica

l Branch



                BUN, CREATININE, CA)                                 

 

                POCT GLUCOSE (AUTOMATED) 2022 21:50:00 Tobias Hernandez Bella

versity [facility]

 

                POCT GLUCOSE (AUTOMATED) 2022 21:50:00 Tobias Hernandez Bella

versity [facility]

 

                POCT GLUCOSE (AUTOMATED) 2022 20:39:00 Tobias Hernandez Bella

versity [facility]

 

                POCT GLUCOSE (AUTOMATED) 2022 20:39:00 AlbTobias gupta Uni

versity [facility]

 

                POCT GLUCOSE (AUTOMATED) 2022 18:58:00 AlbTobias gupta Uni

versity of [facility]

 

                POCT GLUCOSE (AUTOMATED) 2022 18:58:00 AlbTobias gupta [facility]

[facility]

 

                BASIC METABOLIC PANEL 2022 17:46:00 Yong Tobias Univer

[facility] [place]



                (NA, K, CL, CO2, GLUCOSE,                                 Medica

l Branch



                BUN, CREATININE, CA)                                 

 

                BASIC METABOLIC PANEL 2022 17:46:00 Tobias Hernandez Univer

[facility] [place]



                (NA, K, CL, CO2, GLUCOSE,                                 Medica

l Branch



                BUN, CREATININE, CA)                                 

 

                POCT GLUCOSE (AUTOMATED) 2022 17:39:00 Tobias Hernandez [facility]

 

                POCT GLUCOSE (AUTOMATED) 2022 17:39:00 Tobias Hernandez [facility]

 

                POCT GLUCOSE (AUTOMATED) 2022 16:22:00 Albcandy Tobias [facility]

 

                POCT GLUCOSE (AUTOMATED) 2022 16:22:00 Albcandy Tobias [facility]

 

                POCT GLUCOSE (AUTOMATED) 2022 15:27:00 Yong Tobias [facility]

 

                POCT GLUCOSE (AUTOMATED) 2022 15:27:00 Yong Tobias [facility]

 

                POCT GLUCOSE (AUTOMATED) 2022 14:17:00 Albcandy Tobias [facility]

 

                POCT GLUCOSE (AUTOMATED) 2022 14:17:00 Yong [facility]

 

                CBC WITHOUT DIFF 2022 13:33:00 Yong [facility]

 

                CBC WITHOUT DIFF 2022 13:33:00 Yong [facility]

 

                POCT GLUCOSE (AUTOMATED) 2022 13:20:00 Tobias Hernandez [facility]

 

                POCT GLUCOSE (AUTOMATED) 2022 13:20:00 Albcandy Tobias [facility]

 

                POCT GLUCOSE (AUTOMATED) 2022 12:13:00 Albcandy Tobias [facility]

 

                POCT GLUCOSE (AUTOMATED) 2022 12:13:00 Albcandy Tobias [facility]

 

                XR CHEST 1 VW   2022 11:46:06 Yong [facility]

 

                XR CHEST 1 VW   2022 11:46:06 Albcandy [facility]

 

                MAGNESIUM       2022 10:54:00 [facility]

 

                MRSA / MSSA SCREEN BY 2022 10:54:00 Albustami, [facility]

 

                BASIC METABOLIC PANEL 2022 10:54:00 [facility]



                (NA, K, CL, CO2, GLUCOSE,                                 Medica

l Branch



                BUN, CREATININE, CA)                                 

 

                OSMOLALITY, SERUM OR 2022 10:54:00 [facility]

 

                PHOSPHORUS      2022 10:54:00 [facility]

 

                BETA HYDROXY-BUTYRATE 2022 10:54:00 Albustami, [facility]

 

                PHOSPHORUS      2022 10:54:00 [facility]

 

                MAGNESIUM       2022 10:54:00 [facility]

 

                OSMOLALITY, SERUM OR 2022 [facility]:54:00 [facility]

 

                BETA HYDROXY-BUTYRATE 2022 10:54:00 Albustami, [facility]

 

                BASIC METABOLIC PANEL 2022 10:54:00 [facility], Tobias Univer

[facility] [place]



                (NA, K, CL, CO2, GLUCOSE,                                 Medica

l Branch



                BUN, CREATININE, CA)                                 

 

                MRSA / MSSA SCREEN BY 2022 10:54:00 Albustami [facility]

 

                POCT GLUCOSE (AUTOMATED) 2022 10:53:00 Albcandy Tobias [facility]

 

                POCT GLUCOSE (AUTOMATED) 2022 10:53:00 Albdamianami Tobias [facility]

 

                POCT GLUCOSE (AUTOMATED) 2022 08:46:00 Radha Fofana [facility]

 

                POCT GLUCOSE (AUTOMATED) 2022 08:46:00 Radha Fofana [facility]

 

                POCT GLUCOSE (AUTOMATED) 2022 07:39:00 Radha Fofana

[facility]

 

                POCT GLUCOSE (AUTOMATED) 2022 07:39:00 Radha Fofana

[facility]

 

                POCT GLUCOSE (AUTOMATED) 2022 07:02:00 [facility]

 

                POCT GLUCOSE (AUTOMATED) 2022 07:02:00 Radha Fofana

[facility]

 

                POCT GLUCOSE (AUTOMATED) 2022 06:17:00 Radha Fofana

[facility]

 

                POCT GLUCOSE (AUTOMATED) 2022 06:17:00 Radha Fofana

[facility]

 

                AC PANEL 21 + LACTIC ACID 2022 05:36:00 Radha Fofana

[facility]

 

                AC PANEL 21 + LACTIC ACID 2022 05:36:00 Radha Fofana

[facility]

 

                POCT GLUCOSE (AUTOMATED) 2022 04:58:00 Radha Fofana Bella

[facility]

 

                POCT GLUCOSE (AUTOMATED) 2022 04:58:00 Radha Fofana

[facility]

 

                CT ABDOMEN PELVIS W 2022 04:33:00 Radha Fofana [facility]



                CONTRAST                                        [facility]

 

                CT ABDOMEN PELVIS W 2022 04:33:00 Radha Fofana [facility]



                CONTRAST                                        [facility]

 

                COMP. METABOLIC PANEL 2022 04:20:00 Radha Fofana [facility]



                ([id number])                                         Medical Branch

 

                COMP. METABOLIC PANEL 2022 04:20:00 Radha Fofana [facility]



                ([id number])                                         [facility]

 

                CBC WITH DIFF   2022 03:14:00 Radha Fofana [facility]

 

                BLOOD CULTURE SCREEN 2022 03:14:00 Radha Fofana [facility]

 

                BLOOD CULTURE SCREEN 2022 03:14:00 Radha Fofana [facility]

 

                CBC WITH DIFF   2022 03:14:00 Radha Fofana [facility]

 

                ACTIVATED PARTIAL 2022 03:13:00 Radha Fofana [facility]

 

                PROTHROMBIN TIME / INR 2022 03:13:00 Radah Fofana [facility]

 

                LIPASE          2022 03:13:00 Radha Fofana [facility]

 

                TROPONIN I      2022 03:13:00 Radha Fofana [facility]

 

                N-TERMINAL PRO-BNP 2022 03:13:00 Radha Fofana [facility]

 

                LIPASE          2022 03:13:00 Radha Fofana [facility]

 

                TROPONIN I      2022 03:13:00 Radha Fofana [facility]

 

                PROTHROMBIN TIME / INR 2022 03:13:00 [facility]

[facility]

 

                ACTIVATED PARTIAL 2022 03:13:00 Brigido [facility]

 

                N-TERMINAL PRO-BNP 2022 03:13:00 Radha Fofana [facility]

 

                LACTIC ACID WHOLE BLOOD 2022 03:12:00 [facility]

 

                LACTIC ACID WHOLE BLOOD 2022 03:12:00 Brigido [facility]

 

                EKG-12 LEAD     2022 01:55:16 Doctor Unassigned, No Univer

sity of [facility]

 

                EKG-12 LEAD     2022 01:55:16 Doctor Unassigned, No Univer

sity of [facility]

 

                EMERGENCY DEPARTMENT 2022 05:01:00 Doctor Unassigned, No [facility]

[facility]

 

                HOSPITAL ADMISSION 2022 05:01:00 Doctor Unassigned, No Uni

versity [facility]

 

                BASIC METABOLIC PANEL 2022 09:31:00 Moose Mosquera [facility]



                (NA, K, CL, CO2, GLUCOSE,                                 Medica

l Branch



                BUN, CREATININE, CA)                                 

 

                CBC WITH DIFF   2022 09:31:00 [facility]

 

                BASIC METABOLIC PANEL 2022 09:31:00 [place]ff, [facility]



                (NA, K, CL, CO2, GLUCOSE,                                 Medica

l Branch



                BUN, CREATININE, CA)                                 

 

                CBC WITH DIFF   2022 09:31:00 Eveline [facility]

 

                BASIC METABOLIC PANEL 2022 08:57:00 Eveline [facility]



                (NA, K, CL, CO2, GLUCOSE,                                 Medica

l Branch



                BUN, CREATININE, CA)                                 

 

                CBC WITH DIFF   2022 08:57:00 Eveline [facility]

 

                BASIC METABOLIC PANEL 2022 08:57:00 adam [facility]



                (NA, K, CL, CO2, GLUCOSE,                                 Medica

l Branch



                BUN, CREATININE, CA)                                 

 

                CBC WITH DIFF   2022 08:57:00 Eveline [facility]

 

                CBC WITH DIFF   2022 10:55:00 Rand Dong  [facility]

 

                COMP. METABOLIC PANEL 2022 10:55:00 Letitia [facility]



                ([id number])                                         [facility]

 

                N-TERMINAL PRO-BNP 2022 10:55:00 Letitia reymundo  [facility]

 

                COMP. METABOLIC PANEL 2022 10:55:00 Letitia [facility]



                ([id number])                                         [facility]

 

                CBC WITH DIFF   2022 10:55:00 Letitia reymundo  [facility]

 

                N-TERMINAL PRO-BNP 2022 10:55:00 Letitia reymundo  [facility]

 

                CBC WITH DIFF   2022 11:33:00 [facility]ly [facility]

 

                COMP. METABOLIC PANEL 2022 11:33:00 Letitia [facility]



                ([id number])                                         [facility]

 

                N-TERMINAL PRO-BNP 2022 11:33:00 [facility]ia [facility]

 

                MAGNESIUM       2022 11:33:00 Letitia reymundo  [facility]

 

                MAGNESIUM       2022 11:33:00 Rand Dong  [facility]

 

                COMP. METABOLIC PANEL 2022 11:33:00 Rand Dong  [facility]



                ([id number])                                         [facility]

 

                CBC WITH DIFF   2022 11:33:00 Ashly [facility]

 

                N-TERMINAL PRO-BNP 2022 11:33:00 Rand Dong  [facility]

 

                ASPIRATE OR ABSCESS 2022 21:31:00 Marvin Cernaa   [facility] [place]



                CULTURE(AEROBIC/ANAEROBIC                                 Medica

l Branch



                )                                               

 

                ASPIRATE OR ABSCESS 2022 21:31:00 Nehemiah Coleen   [facility]

[facility] [place]



                CULTURE(AEROBIC/ANAEROBIC                                 Medica

l Branch



                )                                               

 

                PROTEIN CREAT RATIO URINE 2022 11:11:00 Rand Dong  [facility]

 

                SODIUM, URINE RANDOM 2022 11:11:00 Rand Dong  [facility]

 

                SODIUM, URINE RANDOM 2022 11:11:00 Rand Dong  [facility]

 

                PROTEIN CREAT RATIO URINE 2022 11:11:00 Rand Dong  [facility]

 

                XR CHEST 1 VW   2022 11:07:43 Rand Dong  [facility]

 

                XR FOOT 3+ VW LEFT 2022 11:07:43 Rand Dong  [facility]

 

                XR CHEST 1 VW   2022 11:07:43 Rand Dong  [facility]

 

                XR FOOT 3+ VW LEFT 2022 11:07:43 Rand Dong  [facility]

 

                URINE DRUG (IMMUNOASSAY) 2022 11:07:00 Rand Dong  Uni

[facility] [place]



                - COMPREHENSIVE DRUG                                 Medical [facility]



                SCREEN                                          

 

                URINE DRUG (LCMSMS) - 2022 11:07:00 Rand Dong  [facility]



                OPIATES PANEL                                   [facility]

 

                URINE DRUG (IMMUNOASSAY) 2022 11:07:00 Rand Dong  [facility]



                SCREEN                                          

 

                URINE DRUG (LCMSMS) - 2022 11:07:00 Letitia reymundo  [facility]



                SYNTHETIC OPIATES PANEL                                 Medical 

Branch

 

                SEDIMENTATION RATE 2022 11:03:00 Letitia reymundo  [facility]

 

                PROCALCITONIN   2022 11:03:00 Letitia [facility]

 

                CBC WITH DIFF   2022 11:03:00 Letitia [facility]

 

                COMP. METABOLIC PANEL 2022 11:03:00 Letitia [facility]



                ([id number])                                         [facility]

 

                N-TERMINAL PRO-BNP 2022 11:03:00 Letitia reymundo  [facility]

 

                MAGNESIUM       2022 11:03:00 Letitia [facility]

 

                PHOSPHORUS      2022 11:03:00 Letitia [facility]

 

                CREATINE KINASE 2022 11:03:00 Letitia [facility]

 

                VITAMIN D, 25-OH 2022 11:03:00 Letitia [facility]

 

                VITAMIN B12, LEVEL 2022 11:03:00 [facility]

 

                GLYCOSYLATED HEMOGLOBIN 2022 11:03:00 Letitia reymundo  Univ

[facility] [place]



                (A1C)                                           [facility]

 

                PHOSPHORUS      2022 11:03:00 Letitia [facility]

 

                CREATINE KINASE 2022 11:03:00 Letitia [facility]

 

                MAGNESIUM       2022 11:03:00 Letitia [facility]

 

                VITAMIN B12, LEVEL 2022 11:03:00 [facility]

 

                COMP. METABOLIC PANEL 2022 11:03:00 Letitia [facility]



                ([id number])                                         [facility]

 

                SEDIMENTATION RATE 2022 11:03:00 [facility]

 

                CBC WITH DIFF   2022 11:03:00 Letitia [facility]

 

                GLYCOSYLATED HEMOGLOBIN 2022 11:03:00 Letitia Adnan  Univ

[facility] [place]



                (A1C)                                           [facility]

 

                N-TERMINAL PRO-BNP 2022 11:03:00 [facility]ia [facility]

 

                VITAMIN D, 25-OH 2022 11:03:00 Letitia [facility]

 

                PROCALCITONIN   2022 11:03:00 Letitia [facility]

 

                CT ANGIOGRAM LOWER 2022 03:39:00 Radha Fofana [facility]



                EXTREMITY LEFT W CONTRAST                                 Medica

l Branch

 

                CT ANGIOGRAM LOWER 2022 03:39:00 Radha Fofana [facility]



                EXTREMITY LEFT W CONTRAST                                 Medica

l Branch

 

                CT HEAD WO CONTRAST 2022 03:25:00 Radha Fofana [facility]

 

                CT HEAD WO CONTRAST 2022 03:25:00 Radha Fofana [facility]

 

                URINALYSIS      2022 01:22:00 Radha Fofana [facility]

 

                URINE CULTURE   2022 01:22:00 Radha Fofana [facility]

 

                URINALYSIS      2022 01:22:00 Radha Fofana [facility]

 

                URINE CULTURE   2022 01:22:00 Radha Fofana [facility]

 

                CBC WITH DIFF   2022 00:58:00 Radha Fofana [facility]

 

                ACTIVATED PARTIAL 2022 00:58:00 Radha Fofana [facility]



                [facility]

 

                PROTHROMBIN TIME / INR 2022 00:58:00 Radha Fofana [facility]

 

                COMP. METABOLIC PANEL 2022 00:58:00 Radha Fofana [facility]



                ([id number])                                         [facility]

 

                BLOOD CULTURE SCREEN 2022 00:58:00 Radha Fofana [facility]

 

                LACTIC ACID WHOLE BLOOD 2022 00:58:00 Radha Fofana [facility]

 

                N-TERMINAL PRO-BNP 2022 00:58:00 Rand Dong  [facility]

 

                MAGNESIUM       2022 00:58:00 Letitia [facility]

 

                PHOSPHORUS      2022 00:58:00 [facility]

 

                FERRITIN SERUM  2022 00:58:00 [facility]ia [facility]

 

                IRON PANEL      2022 00:58:00 Let[facility]ia [facility]

 

                THYROID STIMULATING 2022 00:58:00 Letitia [facility]

 

                LIPID PANEL ([id number])(TOTAL 2022 00:58:00 Rand Dong  [facility]



                [facility]



                TRIGLYCERIDES, HDL)                                 

 

                BLOOD CULTURE SCREEN 2022 00:58:00 Radha Fofana [facility]

 

                PHOSPHORUS      2022 00:58:00 [facility]ia [facility]

 

                MAGNESIUM       2022 00:58:00 Let[facility]ia [facility]

 

                FERRITIN SERUM  2022 00:58:00 Let[facility]ia [facility]

 

                THYROID STIMULATING 2022 00:58:00 Letitia [facility]

 

                COMP. METABOLIC PANEL 2022 00:58:00 Radha Fofana [facility]



                ([id number])                                         [facility]

 

                LIPID PANEL ([id number])(TOTAL 2022 00:58:00 Rand Dong  [facility]



                [facility]



                TRIGLYCERIDES, HDL)                                 

 

                IRON PANEL      2022 00:58:00 Let[facility]ia [facility]

 

                CBC WITH DIFF   2022 00:58:00 Radha Fofana [facility]

 

                PROTHROMBIN TIME / INR 2022 00:58:00 Radha Fofana [facility]

 

                ACTIVATED PARTIAL 2022 00:58:00 Radha Fofana [facility]



                THRMPLAS [facility]

 

                N-TERMINAL PRO-BNP 2022 00:58:00 Rand Dong  [facility]

 

                LACTIC ACID WHOLE BLOOD 2022 00:58:00 Radha Fofana [facility]

 

                EMERGENCY DEPARTMENT 2022-05-10 05:01:00 Doctor Unassigned, No U

niversity of 

[facility] ADM - [facility] 2022-05-10 05:01:00 Doctor Unassigned, No Un

iversity of 

[facility]

 

                HOSPITAL ADMISSION 2022-05-10 05:01:00 Doctor Unassigned, No Uni

versity of [facility]

 

                EMERGENCY DEPARTMENT 2022-05-10 05:01:00 Doctor Unassigned, No U

niversity of 

[facility] ADM - [facility] 2022-05-10 05:01:00 Doctor Unassigned, No Un

iversity of 

[facility]

 

                Spinal puncture, lumbar, 2022 20:30:00                 Patience Garcia



                diagnostic; with                                 



                fluoroscopic or CT                                 



                guidance                                        

 

                MAGNESIUM       2022 04:12:00 [facility]

 

                COMP. METABOLIC PANEL 2022 04:12:00 Elierer, Chris [facility]

[facility] [place]



                ([id number]

 

                CBC WITH DIFF   2022 04:12:00 Elier [facility]

 

                CT HEAD WO CONTRAST 2022 00:15:29 ALLISON [facility]

 

                URINALYSIS      2022 23:53:00 ALLISON Romeo Elena [facility]

 

                COMP. METABOLIC PANEL 2022 23:52:00 ALLISON Romeo Eelna [facility]

[facility] [place]



                ([id number])                                         [facility]

 

                CBC WITH DIFF   2022 23:52:00 ALLISON Romeo Elena [facility]

 

                XR CHEST 1 VW   2022 03:03:32 Rand Dong  [facility]

 

                COMP. METABOLIC PANEL 2022 11:57:00 Cielo Thomas   [facility]



                ([id number])                                         [facility]

 

                CBC WITH DIFF   2022 11:57:00 Cielo Thomas   [facility]

 

                BASIC METABOLIC PANEL 2022 12:10:00 [facility]



                (NA, K, CL, CO2, GLUCOSE,                                 Medica

l Branch



                BUN, CREATININE, CA)                                 

 

                CBC WITH DIFF   2022 12:09:00 [facility]

 

                URINALYSIS      2022 00:40:00 Suly Luna [facility]

 

                URINE CULTURE   2022 00:40:00 Suly Luna [facility]

 

                FECES CULTURE   2022 14:58:00 [facility]

 

                OCCULT (GUAIAC) BLOOD 2022 [facility]:58:00 [facility]

 

                CLOSTRIDIUM DIFFICILE 2022 14:58:00 [facility]



                TOXIN                                           [facility]

 

                FECAL PATHOGENS BY PCR 2022 14:58:00 [facility]

 

                URINE DRUG (IMMUNOASSAY) 2022 10:11:00 [facility]



                SCREEN                                          

 

                COVID-19 (ID NOW RAPID 2022 07:33:00 Ashley Garay [facility]



                TESTING)                                        [facility]

 

                LAB ONLY COVID  2022 07:33:00 Ashley Garay [facility]



                INTERPRETATION                                  [facility]

 

                COMP. METABOLIC PANEL 2022 06:18:00 Ashley Garay [facility]



                ([id number])                                         [facility]

 

                CBC WITH DIFF   2022 05:40:00 Ashley Garay [facility]

 

                URINALYSIS      2022 01:43:00 Alma Villaseñor [facility]

 

                LIPASE          2022 01:40:00 Alma Villaseñor [facility]

 

                COMP. METABOLIC PANEL 2022 01:40:00 Alma Villaseñor [facility]



                ([id number])                                         [facility]

 

                CBC WITH DIFF   2022 01:38:00 Alma Villaseñor [facility]

 

                XR CHEST 1 VW   2022 23:40:19 Alma Villaseñor [facility]

 

                ASSIGNMENT OF BENEFITS 2022 22:05:40 Doctor Unassigned, No

 [facility]

 

                NOTICE OF PRIVACY 2022 22:04:58 Doctor Unassigned, No [facility]



                PRACTICES                       Name            [facility]

 

                CONSENT/REFUSAL FOR 2022 22:04:33 Doctor Unassigned, No [facility]



                DIAGNOSIS AND TREATMENT                 Name            [facility]

 

                URINALYSIS      2022-01-15 23:09:00 Neeta Maria [facility]

 

                BLOOD CULTURE SCREEN 2022-01-15 23:04:00 Neeta Maria [facility]

 

                D-DIMER         2022-01-15 22:49:00 Neeta Maria [facility]

 

                COVID-19 (ID NOW RAPID 2022-01-15 22:48:00 Neeta Maria [facility]

[facility] [place]



                TESTING)                                        [facility]

 

                COMP. METABOLIC PANEL 2022-01-15 22:17:00 Neeta Maria [facility]

[facility] [place]



                ([id number])                                         [facility]

 

                CBC WITH DIFF   2022-01-15 22:17:00 Neeta Maria [facility]

 

                XR CHEST 1 VW   2022-01-15 21:47:19 Neeta Maria [facility]

 

                COMP. METABOLIC PANEL 2021 22:20:00 Chris Sol [facility]

[facility] [place]



                ([id number])                                         [facility]

 

                CBC WITH DIFF   2021 22:20:00 Chris Sol o

f [facility]

 

                CT ABDOMEN PELVIS WO 2021-05-10 00:39:40 Acacia Villa [facility]

[facility] [place]



                CONTRAST                                        [facility]

 

                LIPASE          2021-05-10 00:06:00 Acacia Villa [facility]

 

                COMP. METABOLIC PANEL 2021-05-10 00:06:00 Acacia Villa [facility]



                ([id number])                                         [facility]

 

                CBC WITH DIFF   2021-05-10 00:06:00 Acacia [facility]

 

                URINALYSIS      2021-05-10 00:00:00 Acacia [facility]

 

                COVID-19 (ID NOW RAPID 2021-05-10 00:00:00 Acacia Villa [facility]

[facility]



                TESTING)                                        [facility]

 

                Cholecystectomy                                 [facility]

 

                Shunt of cerebral                                 [facility]

nn



                ventricle to extracranial                                 



                site                                            







Plan of Care







             Planned Activity Planned Date Details      Comments     Source

 

             Future Scheduled 2023-01-10   Lipid panel               [facility]

s



             Test         00:00:00     (procedure) [code =              [facility]



                                       95976499]                 

 

             Future Scheduled 2023-01-10   Lipid panel               [facility]

s



             Test         00:00:00     (procedure) [code =              [facility]



                                       [id number]]                 

 

             Future Scheduled 2023-01-10   Lipid panel               [facility]

s



             Test         00:00:00     (procedure) [code =              [facility]



                                       [id number]]                 

 

             Future Scheduled 2023-01-10   Lipid panel               [facility]

s



             Test         00:00:00     (procedure) [code =              Medical 

Center



                                       [id number]]                 

 

             Future Scheduled 2022   INFLUENZA VACCINE (#1)              C

[facility]



             Test         00:00:00     [code = INFLUENZA              Medical Ce

nter



                                       VACCINE (#1)]              

 

             Future Scheduled 2022   INFLUENZA VACCINE (#1)              C

[facility]



             Test         00:00:00     [code = INFLUENZA              Medical Ce

nter



                                       VACCINE (#1)]              

 

             Future Scheduled 2022   INFLUENZA VACCINE (#1)              C

[facility]



             Test         00:00:00     [code = INFLUENZA              Medical Ce

nter



                                       VACCINE (#1)]              

 

             Future Scheduled 2022   DEPRESSION SCREENING              [facility]



             Test         00:00:00     (12+) [code =              Medical Center



                                       DEPRESSION SCREENING              



                                       (12+)]                    

 

             Future Scheduled 2022   DEPRESSION SCREENING              [facility]



             Test         00:00:00     (12+) [code =              Medical Center



                                       DEPRESSION SCREENING              



                                       (12+)]                    

 

             Future Scheduled 2022   DEPRESSION SCREENING              [facility]



             Test         00:00:00     (12+) [code =              Medical Center



                                       DEPRESSION SCREENING              



                                       (12+)]                    

 

             Future Scheduled 2021   INFLUENZA VACCINE              [facility]



             Test         00:00:00     (Season Ended) [code =              Medic

al Center



                                       INFLUENZA VACCINE              



                                       (Season Ended)]              

 

             Future Scheduled 2021   DEPRESSION SCREENING              [facility]



             Test         00:00:00     (12+) [code =              Medical Center



                                       DEPRESSION SCREENING              



                                       (12+)]                    

 

             Future Scheduled 2020-04-10   Hemoglobin A1c              [facility]

kes



             Test         00:00:00     measurement               Medical Center



                                       (procedure) [code =              



                                       10819857]                 

 

             Future Scheduled 2020-04-10   Hemoglobin A1c              [facility]

kes



             Test         00:00:00     measurement               Medical Center



                                       (procedure) [code =              



                                       38338583]                 

 

             Future Scheduled 2020-04-10   Hemoglobin A1c              [facility]

kes



             Test         00:00:00     measurement               Medical Center



                                       (procedure) [code =              



                                       59611083]                 

 

             Future Scheduled 2020-04-10   Hemoglobin A1c              [facility]

kes



             Test         00:00:00     [facility]               Medical Center



                                       (procedure) [code =              



                                       26707079]                 

 

             Future Scheduled 2004   DTAP/TDAP/TD VACCINES              [facility]



             Test         00:00:00     (1 - Tdap) [code =              Medical C

enter



                                       DTAP/TDAP/TD VACCINES              



                                       (1 - Tdap)]               

 

             Future Scheduled 2004   DTAP/TDAP/TD VACCINES              [facility]



             Test         00:00:00     (1 - Tdap) [code =              Medical C

enter



                                       DTAP/TDAP/TD VACCINES              



                                       (1 - Tdap)]               

 

             Future Scheduled 2004   DTAP/TDAP/TD VACCINES              [facility]



             Test         00:00:00     (1 - Tdap) [code =              Medical C

enter



                                       DTAP/TDAP/TD VACCINES              



                                       (1 - Tdap)]               

 

             Future Scheduled 2004   DTAP/TDAP/TD VACCINES              [facility]



             Test         00:00:00     (1 - Tdap) [code =              Medical C

enter



                                       DTAP/TDAP/TD VACCINES              



                                       (1 - Tdap)]               

 

             Future Scheduled 2003   HEPATITIS C SCREENING              [facility]



             Test         00:00:00     [code = HEPATITIS C              Medical 

Center



                                       SCREENING]                

 

             Future Scheduled 2003   HEPATITIS C SCREENING              [facility]



             Test         00:00:00     [code = HEPATITIS C              Medical 

Center



                                       SCREENING]                

 

             Future Scheduled 2003   HEPATITIS C SCREENING              [facility]



             Test         00:00:00     [code = HEPATITIS C              Medical 

Center



                                       SCREENING]                

 

             Future Scheduled 2003   HEPATITIS C SCREENING              [facility]



             Test         00:00:00     [code = HEPATITIS C              Medical 

Center



                                       SCREENING]                

 

             Future Scheduled 1997   COVID-19 VACCINE (1)              [facility]



             Test         00:00:00     [code = COVID-19              Medical Abilio

ter



                                       VACCINE (1)]              

 

             Future Scheduled 1997   Tobacco Cessation              [facility]



             Test         00:00:00     Counseling and              Medical Cente

r



                                       Screening (12+) [code              



                                       = Tobacco Cessation              



                                       Counseling and              



                                       Screening (12+)]              

 

             Future Scheduled 1997   Tobacco Cessation              [facility]



             Test         00:00:00     Counseling and              Medical Cente

r



                                       Screening (12+) [code              



                                       = Tobacco Cessation              



                                       Counseling and              



                                       Screening (12+)]              

 

             Future Scheduled 1995   DIABETIC EYE EXAM              [facility]



             Test         00:00:00     [code = DIABETIC EYE              Medical

 Center



                                       EXAM]                     

 

             Future Scheduled 1995   Urine screening for              [facility]



             Test         00:00:00     protein (procedure)              Medical 

Center



                                       [code = 504265845]              

 

             Future Scheduled 1995   DIABETIC EYE EXAM              [facility]



             Test         00:00:00     [code = DIABETIC EYE              Medical

 Center



                                       EXAM]                     

 

             Future Scheduled 1995   Urine screening for              [facility]



             Test         00:00:00     protein (procedure)              Medical 

Center



                                       [code = 835917743]              

 

             Future Scheduled 1995   DIABETIC EYE EXAM              [facility]



             Test         00:00:00     [code = DIABETIC EYE              Medical

 Center



                                       EXAM]                     

 

             Future Scheduled 1995   Urine screening for              [facility]



             Test         00:00:00     protein (procedure)              Medical 

Center



                                       [code = 420058615]              

 

             Future Scheduled 1995   DIABETIC EYE EXAM              [facility]



             Test         00:00:00     [code = DIABETIC EYE              Medical

 Center



                                       EXAM]                     

 

             Future Scheduled 1995   Urine screening for              [facility]



             Test         00:00:00     protein (procedure)              Medical 

Center



                                       [code = 928751424]              

 

             Future Scheduled 1991   PNEUMOCOCCAL VACCINE              [facility]



             Test         00:00:00     0-64 YRS (1 of 1 -              Medical C

enter



                                       PPSV23) [code =              



                                       PNEUMOCOCCAL VACCINE              



                                       0-64 YRS (1 of 1 -              



                                       PPSV23)]                  

 

             Future Scheduled 1991   PNEUMOCOCCAL VACCINE              [facility]



             Test         00:00:00     0-64 YRS (1 - PCV)              Medical C

enter



                                       [code = PNEUMOCOCCAL              



                                       VACCINE 0-64 YRS (1 -              



                                       PCV)]                     

 

             Future Scheduled 1991   PNEUMOCOCCAL VACCINE              [facility]



             Test         00:00:00     0-64 YRS (1 - PCV)              Medical C

enter



                                       [code = PNEUMOCOCCAL              



                                       VACCINE 0-64 YRS (1 -              



                                       PCV)]                     

 

             Future Scheduled 1991   PNEUMOCOCCAL VACCINE              [facility]



             Test         00:00:00     0-64 YRS (1 - PCV)              Medical C

enter



                                       [code = PNEUMOCOCCAL              



                                       VACCINE 0-64 YRS (1 -              



                                       PCV)]                     

 

             Future Scheduled 1986   COVID-19 VACCINE (#1)              [facility]



             Test         00:00:00     [code = COVID-19              Medical Abilio

ter



                                       VACCINE (#1)]              

 

             Future Scheduled 1986   COVID-19 VACCINE (#1)              [facility]



             Test         00:00:00     [code = COVID-19              Medical Abilio

ter



                                       VACCINE (#1)]              

 

             Future Scheduled 1986   COVID-19 VACCINE (#1)              [facility]



             Test         00:00:00     [code = COVID-19              Medical Abilio

ter



                                       VACCINE (#1)]              







Encounters







        Start   End     Encounter Admission Attending Care    Care    Encounter 

Source



        Date/Time Date/Time Type    Type    Clinicians Facility Department ID   

   

 

        2022         Outpatient                 [facility]     [id number]

 [place]



        10:51:49                                                 [id number]    [facility]

 

        2022         Outpatient                 [facility]     [id number]

 [place]



        08:22:27                                                 0411    [facility]

 

        2022         Outpatient                 [facility]     -5661

 [place]



        11:10:01                                                 0328    [facility]

 

        2022         Outpatient         Jesusita[facility]  [id number]

 Common



        13:45:16                         Domingo                  [id number]   [facility]

 

        2022         Outpatient         Jesusita[facility]  STLMLC  [facility]  [id number]

 Common



        13:17:39                         Domingo                  [id number]   [facility]

 

        2022         Outpatient         Jesusita,  STLMLC  [facility]  [id number]

 Common



        13:16:34                         Domingo                  [id number]   [facility]

 

        2023 Outpatient R       MARY ANNE RAMIREZ [facility]    [id number]

308 Univers



        13:00:00 13:00:00                 MARY ANNE RAMIREZ [facility]

 

        2023 Patient         Mary Anne Ramirez [facility]    1.2.840.114 [id number]

70 Univers



        00:00:00 00:00:00 Secure Msg                 HEALTH  350.1.13.10        

 ity of



                                                [facility] 4.2.7.2.686         Eric

as



                                                HÉCTOR?BLEA [phone]         [facility]    220             [facility]                 



                                                OFFICE                  



                                                [facility]                 

 

        2022 [facility]patient R       [facility]    [id number]

7713 Univers



        13:00:00 15:17:50                 HI                           ity [facility]

 

        2022 Office          [facility]    1.2.840.114 [id number]

428 Univers



        13:00:00 15:17:50 Visit           [facility] 350.1.13.10         i

ty of



                                                [facility] 4.2.7.2.686         [place]

[place]



                                                PROFESSIO [phone]         [address]                 

 

        2022 Orders          Doctor  EDEN    1.2.840.114 [id number]

85 Univers



        00:00:00 00:00:00 Only            Unassigned, STEPHANIE   350.1.13.10       

  ity of



                                        [facility] 4.2.7.2.686         Eric

as



                                                        [phone]         [address]

 

        2022 Outpatient R       MARY ANNE RAMIREZ [facility]    [id number]

401 Univers



        13:00:00 13:39:07                 MARY ANNE RAMIREZ                         [facility]

 

        2022 Office          [facility]    1.2.840.114 [id number]

19 Univers



        13:00:00 13:39:07 Visit                   [facility]  350.1.13.10         it

y of



                                                [place] 4.2.7.2.686         Eric

as



                                                HÉCTOR?BLEA [phone]         [address]                 



                                                OFFICE                  



                                                [facility]                 

 

        2022 Technician         Lab, Ang - Db [facility]    1.2.840.1

14 [id number] 

Univers



        12:45:00 13:00:00 Visit           Mary Anne Ramirez [facility]  350.1.13.10         it

y of



                                                [place] 4.2.7.2.686         Eric

as



                                                HÉCTOR?BLEA [phone]         [facility]



                                                [address]



                                                MEDICAL                 



                                                OFFICE                  



                                                BUILDING                 

 

        2022 [facility] X       [facility]    [place]     [id number]

754 Univers



        16:26:00 17:36:00                 EDWARDO melton [facility]

 

        2022 Emergency         Neeta Maria [facility]    1.2.840

.114 [id number] 

Univers



        16:26:00 17:36:00                 Edwardo Lopez 350.1.13.10    

     ity of



                                                [facility] 4.2.7.2.686         [facility]  [phone]         Medi

sarah



                                                        081             [place]

 

        2022-10-31 2022-10-31 Emergency X       KASI, [facility]    ERT     [id number]

47 Univers



        20:12:00 22:15:00                 ALMA                          [facility]

 

        2022-10-31 2022-10-31 Emergency         [facility]    1.2.286.083 [id number]

4196 Univers



        20:12:00 22:15:00                 Alma MCCARTHY 350.1.13.10         

ity of



                                                [place] 4.2.7.2.686         [facility]  [phone]         Medi

sarah



                                                        084             [place]

 

        2022-10-29 2022-10-29 Emergency X       LESLEY, [facility]    ERT     [id number]

67 Univers



        19:14:00 22:40:00                 ASHLEY                          [facility]

 

        2022-10-29 2022-10-29 Emergency         [facility]    1.2.147.407 [id number]

9139 Univers



        19:14:00 22:40:00                 Ashley MCCARTHY 350.1.13.10         

ity of



                                                [place] 4.2.7.2.686         [facility]  [phone]         Medi

sarah



                                                        084             Branch

 

        2022-10-25 2022-10-25 Transition         IMTIAZ Guzman  1.2.840.114 [id number]

[id number] Univers



        00:00:00 00:00:00 of Care         Dulce DUNN   350.1.13.10        

 ity of



                                                [place]   4.2.7.2.686         [place]

[place]



                                                        [phone]         Medi

sarah



                                                        403             Branch

 

        2022-10-21 2022-10-23 Inpatient X       LEO, [facility]     [id number]

387 Univers



        00:19:00 12:45:00                 [facility]                           ity of



                                                                        [facility]

 

        2022-10-21 2022-10-23 Hospital         Yoni Mariscal [facility]    1.2.8

40.114 [id number] 

Univers



        00:19:00 12:45:00 Encounter         Rachel Bailey 350.1.13.10 

        ity [facility] 4.2.7.2.686         [place]

[facility]  [phone]         Medi

sarah



                                                        081             Branch

 

        2022-10-18 2022-10-18 Outpatient R       VAMSHI [facility]    [id number]

[id number] Univers



        08:00:00 08:00:00                 SAPPHIRE                         ity

 [facility]

 

        2022-10-10 2022-10-10 Outpatient R       VAMSHI [facility]    [id number]

49509 Univers



        08:00:00 08:00:00                 SAPPHIRE                         ity

 [facility]

 

        2022 Outpatient R       MARY ANNE RAMIREZ [facility]    [id number]

271 Univers



        14:00:00 14:00:00                 MARY ANNE RAMIREZ                         ity [facility]

 

        2022 Outpatient R       VAMSHI [facility]    [id number]

73592 Univers



        08:30:00 08:30:00                 SAPPHIRE                         ity

 [facility]

 

        2022 [facility] R       [facility]    [id number]

72278 Univers



        08:00:00 08:00:00                 SAPPHIRE                         ity

 [facility]

 

        2022 Outpatient R       [facility]    [id number]

54471 Univers



        11:30:00 11:30:00                 SAPPHIRE                         ity

 [facility]

 

        2022 Telephone         EEDN Helms    1.2.840.114 [id number]

[id number] Univers



        00:00:00 00:00:00                 Yessica BROWN   350.1.13.10         i

ty [facility] 4.2.7.2.686         Eric

as



                                                        [phone]         Medi

sarah



                                                        025             Branch

 

        2022 Telephone         Yoselyn   [facility]    1.2.122.695 [id number]

1965 Univers



        00:00:00 00:00:00                 Jessi MULTISPEC 350.1.13.10       

  ity of



                                                [place]   4.2.7.2.686         [place]

[place]



                                                [place]  [phone]         Medi

sarah



                                                [address]             Branch



                                                DIABETES                 



                                                CLINIC                  

 

        2022-08-15 2022-08-15 Transition         IMTIAZ Rodney  1.2.840.114 [id number]

[id number] Univers



        00:00:00 00:00:00 of Care         Stephany GAYLE DUNN   350.1.13.10         i

ty of



                                                [place]   4.2.7.2.686         [place]

[place]



                                                        [phone]         Medi

sarah



                                                        403             Branch

 

        2022 Inpatient SCOOBY HUTTON [facility]     [id number]

[id number] Univers



        22:57:00 15:56:00                                                 ity of



                                                                        [facility]

 

        2022 Hospital         Yo Law  1.2.840.

114 [id number] 

Univers



        22:57:00 15:56:00 Encounter         Keenan Uribe   350.1

.13.10         ity of



                                        An Chambers [facility] 4.2.7.2.686     

    [place]



                                        [facility]         [phone]     

    Scooby Agustin         095           

  Branch

 

        2022 Anesthesia         Sheridan Delarosa  1.2.84

0.114 [id number] 

Univers



        11:25:00 12:55:00 Event           [place]   350.1.13.10    

     ity of



                                                HOSPITAL 4.2.7.2.686         Eric

as



                                                        [phone]         Medi

sarah



                                                        103             Branch

 

        2022 Surgery         DAVID Bonds  1.2.248.406 [id number]

3251 Univers



        10:12:00 11:55:00                 Sapphiresarah BROWN   350.1.13.10        

 ity of



                                        [facility] 4.2.7.2.686         Eric

as



                                                        [phone]         Medi

sarah



                                                        103             Branch

 

        2022 Travel                  1.2.840.1 1.2.263.230 1368

9911 Univers



        00:00:00 00:00:00                         48480.1.1 350.1.13.10         

ity of



                                                3.104.2.7 4.2.7.3.698         [place]

[place]



                                                .3.128987 084.8           Medica

l



                                                .8                      Branch

 

        2022 Transition         Thomas,  1.2.840.6 [id number]

[id number] Univers



        00:00:00 00:00:00 of Care         Dulce 54466.1.1                 i

ty of



                                                3.104.2.7                 [place]



                                                .3.911895                 Medica

l



                                                .8                      Branch

 

        2022 Inpatient X       ALFREDLULU [facility]     [id number]

04 Univers



        18:31:00 18:27:00                 KAYLYNN                          ity [facility]

 

        2022 [facility]         Ortega Ashly S 1.2.840.1 [id number]

[id number] 

Univers



        18:31:00 18:27:00 Encounter         Rachel Bailey 28594.1.1            

     ity 



                                        Kaylynn Lu 3.104.2.7                

 [place]



                                                .3.939234                 Medica

l



                                                .8                      [place]

 

        2022 [facility]patient R       [facility]    1041

[id number] Univers



        13:00:00 13:00:00                 RADHA melton o

f



                                                                        [facility]

 

        2022 Travel                  1.2.840.1 1.2.686.571 6652

5846 Univers



        00:00:00 00:00:00                         97816.1.1 350.1.13.10         

ity of



                                                3.104.2.7 4.2.7.3.698         [place]

[place]



                                                .3.509076 084.8           Medica

l



                                                .8                      [place]

 

        2022-07-15 2022-07-15 Emergency X       SINGER, [facility]    ERT     [id number]

93 Univers



        17:06:00 23:29:00                 CHRIS melton [facility]

 

        2022-07-15 2022-07-15 Emergency         Alondra Santos 1.2.840.1 [id number]

[id number]

                                        Univers



        17:06:00 23:29:00                 Chris Sol 06631.1.1             

    ity of



                                                3.104.2.7                 [place]



                                                .3.916780                 Medica

l



                                                .8                      Branch

 

        2022-07-15 2022-07-15 Travel                  1.2.840.1 1.2.640.119 0339

2220 Univers



        00:00:00 00:00:00                         23618.1.1 350.1.13.10         

ity of



                                                3.104.2.7 4.2.7.3.698         [place]

[place]



                                                .3.411609 084.8           Medica

l



                                                .8                      Branch

 

        2022 Outpatient R       [facility]    1040

[id number] Univers



        14:00:00 14:00:00                 RADHA melton o

f



                                                                        [facility]

 

        2022 Emergency X       [facility]     [id number]

86 Univers



        04:52:00 08:20:00                 RADHA sanchezy [facility]

 

        2022 Emergency         Brigido, 1.2.840.3 [id number]

[id number] Univers



        04:52:00 08:20:00                 Radha 30548.1.1                 ity 

of



                                                3.104.2.7                 [place]



                                                .3.077788                 Medica

l



                                                .8                      Branch

 

        2022 Travel                  1.2.840.1 1.2.444.209 3662

5013 Univers



        00:00:00 00:00:00                         90343.1.1 350.1.13.10         

ity of



                                                3.104.2.7 4.2.7.3.698         [place]

[place]



                                                .3.919879 084.8           Medica

l



                                                .8                      Branch

 

        2022 Transition         Guzman,  1.2.840.6 [id number] 94

[id number] Univers



        00:00:00 00:00:00 of Care         Dulce 94992.1.1                 i

ty of



                                                3.104.2.7                 [place]



                                                .3.010788                 Medica

l



                                                .8                      Branch

 

        2022 [facility]patient [facility]       [facility]     [id number]

92521 Univers



        20:31:00 21:18:00                 TREY                           ity of



                                                                        [facility]

 

        2022 [facility]         Radha Fofana 1.2.840.1 [id number]

[id number] 

Univers



        20:31:00 21:18:00 Encounter         Noe Phillips 21629.1.1              

   ity of



                                        [place] 3.104.2.7              

   [place]



                                                .3.411761                 Medica

l



                                                .8                      Branch

 

        2022 Transition         Thomas,  1.2.840.1 [id number] 94

[id number] Univers



        00:00:00 00:00:00 of Care         Dulce 66044.1.1                 i

ty of



                                                3.104.2.7                 [place]



                                                .3.285594                 Medica

l



                                                .8                      Branch

 

        2022 Travel                  1.2.840.1 1.2.831.178 2217

6235 Univers



        00:00:00 00:00:00                         04204.1.1 350.1.13.10         

ity of



                                                3.104.2.7 4.2.7.3.698         [place]

[place]



                                                .3.811303 084.8           Medica

l



                                                .8                      [place]

 

        2022 Transition         Thomas,  1.2.840.8 [id number] 94

[id number] Univers



        00:00:00 00:00:00 of Care         Dulce 76952.1.1                 i

ty of



                                                3.104.2.7                 [place]



                                                .3.126237                 Medica

l



                                                .8                      [place]

 

        2022 Inpatient X       JOHN [facility]     [id number]

48 Univers



        22:21:00 16:16:00                 EDWARDO                           itlissa of



                                                                        [facility]

 

        2022 Hospital         Josse Giles LIAN 1.2.840.1 [id number]

[id number] 

Univers



        22:21:00 16:16:00 Encounter         Edwardo Lopez 49213.1.1             

    ity of



                                                3.104.2.7                 [place]



                                                .3.050645                 Medica

l



                                                .8                      Branch

 

        2022 Travel                  1.2.840.1 1.2.435.332 7901

3062 Univers



        00:00:00 00:00:00                         90962.1.1 350.1.13.10         

ity of



                                                3.104.2.7 4.2.7.3.698         [place]

[place]



                                                .3.412066 084.8           Medica

l



                                                .8                      Branch

 

        2022 2022-06-10 Outpatient X       LEO [facility]    KRISH     [id number]

8802 Univers



        18:25:00 19:30:00                 [facility]                           ity of



                                                                        [facility]

 

        2022 2022-06-10 Emergency         Shelton Cunningham 1.2.840.1 [id number]

[id number] 

Univers



        18:25:00 19:30:00                 Rachel Bailey 78575.1.1              

   ity of



                                                3.104.2.7                 [place]



                                                .3.085220                 Medica

l



                                                .8                      Branch

 

        2022 Travel                  1.2.840.1 1.2.004.544 5379

2645 Univers



        00:00:00 00:00:00                         08964.1.1 350.1.13.10         

ity of



                                                3.104.2.7 4.2.7.3.698         [place]

[place]



                                                .3.426001 084.8           Medica

l



                                                .8                      Branch

 

        2022 Travel                  1.2.840.1 1.2.614.252 1015

5045 Univers



        00:00:00 00:00:00                         18211.1.1 350.1.13.10         

ity of



                                                3.104.2.7 4.2.7.3.698         [place]

[place]



                                                .3.353213 084.8           Medica

l



                                                .8                      Branch

 

        2022 Transition         Rodney, 1.2.840.6 [id number]

[id number] Univers



        00:00:00 00:00:00 of Care         Stephany M 15528.1.1                 ity

 of



                                                3.104.2.7                 [place]



                                                .3.504277                 Medica

l



                                                .8                      [place]

 

        2022 Inpatient X       SHAIKH [facility]    KR[place]     [id number]

83 Univers



        20:53:00 18:25:00                 DEAN spangler



                                                                        [facility]

 

        2022 [facility]         Radha Fofana 1.2.840.1 [id number]

[id number] 

Univers



        20:53:00 18:25:00 Encounter         Tobias Hernandez 17316.1.1           

      ity of



                                        Terminadrian Efrain 3.104.2.7             

    [place]



                                        Powell, Martha H .3.958208               

  Medical



                                        Dean Segovia .8                     

 Branch

 

        2022 Travel                  1.2.840.1 1.2.950.110 1829

3172 Univers



        00:00:00 00:00:00                         74707.1.1 350.1.13.10         

ity of



                                                3.104.2.7 4.2.7.3.698         [place]

[place]



                                                .3.109492 084.8           Medica

l



                                                .8                      Branch

 

        2022 Transition         Guzman,  1.2.840.5 [id number]

[id number] Univers



        00:00:00 00:00:00 of Care         Dulce 05913.1.1                 i

ty of



                                                3.104.2.7                 [place]



                                                .3.722276                 Medica

l



                                                .8                      Branch

 

        2022-05-10 2022 Inpatient X       LETITIA [facility]     [id number]

201 Univers



        19:10:00 17:32:00                 ADNAN                           ity of



                                                                        [facility]

 

        2022-05-10 2022 [facility]         Radha Fofana 1.2.840.1 [id number]

[id number] 

Univers



        19:10:00 17:32:00 Encounter         Rand Dong 65316.1.1            

     ity of



                                                3.104.2.7                 [place]



                                                .[id number]                 Medica

l



                                                .8                      Branch

 

        2022-05-10 2022-05-10 Travel                  1.2.840.1 1.2.014.570 [id number]

6685 Univers



        00:00:00 00:00:00                         [id number] 350.1.13.10         

ity of



                                                3.104.2.7 4.2.7.3.698         [place]

[place]



                                                .[id number] 084.8           Medica

l



                                                .8                      Branch

 

        2022 Observatio                 nullFlavo [facility] 5510

[id number] Memoria



        12:00:00 22:50:00 n                       joanie Garcia 98      l



                                                        [facility]

 

        2022 Observatio                 [facility]Flavo [facility] 5510

[id number] Memoria



        12:00:00 22:50:00 n                       joanie Garcia 98      l



                                                        [facility]

 

        2022 Outpatient U       [place], [facility]    

    [facility]



        07:00:00 17:50:00                 TREY                          

 

        2022 Outpatient         [place], [facility]   5510

[id number] 



        07:00:00 17:50:00                 Trey Valeriy                       

 

        2022 Outpatient         [place], [facility]   5510

[id number] 



        18:14:00 18:14:00                 Treyfredi Blas      

 

        2022 Emergency X       [facility]    ERT     [id number]

29 Univers



        22:45:00 01:52:00                 CHRIS melton [facility]

 

        2022 Emergency         [facility]    1.2.285.600 [id number]

2685 Univers



        22:45:00 01:52:00                 Chris MCCARTHY 350.1.13.10         [facility] 4.2.7.2.686         [facility]  [phone]         [address]

 

        2022 Inpatient                 nullFlavo [facility] 10019

[id number] Memoria



        05:12:00 15:30:00                         joanie Garcia 84      l



                                                        [facility]

 

        2022 Inpatient                 nullFlavo [facility] 74405

16824 Memoria



        05:12:00 15:30:00                         joanie Garcia 84      l



                                                        [facility]

 

        2022 Outpatient         Ap,  [facility]   [id number]

[id number] 



        00:12:00 10:30:00                 Bernardo                  Krysta Guajardo                         

 

        2022 Emergency                 nullFlavo [facility] 51254

85278 Memoria



        02:51:00 02:51:00                         r       Jose 83      l



                                                        [facility]

 

        2022 Emergency                 nullFlavo [facility] 54462

92738 Memoria



        02:51:00 02:51:00                         [facility] 83      l



                                                        [facility]

 

        2022 Outpatient         Ap,  [facility]   [id number]

[id number] 



        00:12:00 00:12:00                 Bernardo Guajardo                         

 

        2022 Outpatient         [facility],  [facility]   [id number]

620 



        21:51:00 21:51:00                 Bernardo Guajardo                         

 

        2022 Emergency X       AGUEDA, K [facility]    ERT     [id number]

5665 Univers



        18:08:00 22:51:00                                                 ity of



                                                                        [facility]

 

        2022 Emergency         Agueda, ALLISON [facility]    1.2.840.114 [id number]

[id number] Univers



        18:08:00 22:51:00                 Elena MCCARTHY 350.1.13.10         i

ty of



                                                [facility] 4.2.7.2.686         [facility]  [phone]         Medi

sarah



                                                        084             Branch

 

        2022 Transition         IMTIAZ Guzman  1.2.840.114 [id number]

32038 Univers



        00:00:00 00:00:00 of Care         Dulce DUNN   350.1.13.10        

 ity 



                                                ROSEMARY   4.2.7.2.686         [place]

[place]



                                                        [phone]         Medi

sarah



                                                        403             Branch

 

        2022 Inpatient X       [facility]    KRISH     [id number]

27 Univers



        19:07:00 19:39:00                 ASHLEY                          ity [facility]

 

        2022 [facility]    1.2.840.

114 [id number] 

Univers



        19:07:00 19:39:00 Encounter         Rachel Bailey 350.1.13.10 

        ity of



                                                [place] 4.2.7.2.686         [facility]  [phone]         Medi

sarah



                                                        081             Branch

 

        2022 Emergency X       KASI, [facility]    ERT     [id number]

54 Univers



        14:41:00 22:07:00                 ALMA                          [facility]

 

        2022 Emergency         [place], [facility]    1.2.620.074 [id number]

7030 Univers



        14:41:00 22:07:00                 Alma MCCARTHY 350.1.13.10         

ity 



                                                [facility] 4.2.7.2.686         [facility]  [phone]         [address]

 

        2022-01-15 2022-01-15 Emergency X       [facility]    ERT     [id number]

25 Univers



        14:49:00 21:33:00                 NEETA                          [facility]

 

        2022-01-15 2022-01-15 Emergency         [facility]    1.2.058.910 [id number]

0725 Univers



        14:49:00 21:33:00                 Neeta MCCARTHY 350.1.13.10         

ity 



                                                [facility] 4.2.7.2.686         [facility]  [phone]         [address]

 

        2021 Laboratory         Only, Ang Db Test [facility]    1.2.8

40.114 [id number] 

Univers



        18:00:00 18:15:00 Only            Nicole Llanes [facility]  350.1.13.10      

   [facility] 4.2.7.2.686         Eric

as



                                                HÉCTOR?BLEA [phone]         [address]



                                                MEDICAL                 



                                                OFFICE                  



                                                BUILDING                 

 

        2021 Outpatient R       HI   [facility]    [id number]

787 Univers



        18:00:00 18:00:00                 NICOLE                          [facility]

 

        2021 [facility]TEL)                   [facility]  [id number] Co

mmon



        00:00:00 00:00:00                                                 Spirit



                                                                        - [facility]



                                                                        [facility]

 

        2021 (ESTPT)                 [facility]  [facility]  [id number] Co

mmon



        00:00:00 00:00:00 Abhi gaona Patient                                         - [facility]



                                                                        [facility]

 

        2021 Emergency X       SINGER, [facility]    ERT     [id number]

74 Univers



        15:54:00 18:34:00                 CHRIS melton [facility]

 

        2021 Emergency         [facility]    1.2.783.228 [id number]

8236 Univers



        15:54:00 18:34:00                 Chris MCCARTHY 350.1.13.10         i

ty of



                                                [place] 4.2.7.2.686         [facility]  [phone]         Medi

sarah



                                                        084             Branch

 

        2021-10-12 2021-10-12 (TEL)                   STLMLC  STLMLC  [id number] Co

mmon



        00:00:00 00:00:00                                                 [facility]

 

        2021 (ESTPT)                 STLMLC  STLMLC  [id number] Co

mmon



        00:00:00 00:00:00 Establishe                                         Spi

rit



                        d Patient                                         - [facility]

 

        2021-08-10 2021-08-10 (TEL)                   STLMLC  STLMLC  [id number] Co

mmon



        00:00:00 00:00:00                                                 [facility]

 

        2021 (ESTPT)                 STLMLC  STLMLC  [id number] Co

mmon



        00:00:00 00:00:00 Establishe                                         Spi

rit



                        d Patient                                         - [facility]



                                                                        [facility]

 

        2021 OFFICE                  STLMLC  STLMLC  [id number] Co

mmon



        00:00:00 00:00:00 VISIT NEW                                         Spir

it



                        PT LEVEL 3                                         - [facility]

 

        2021 Emergency         [facility]    1.2.840.114 [id number]

[id number] Univers



        18:22:00 22:21:00                 Acacia Mccarthy 350.1.13.10        

 ity of



                                                [place] 4.2.7.2.686         [facility]  [phone]         Medi

sarah



                                                        084             Branch

 

        2021 Emergency         [facility]    1.2.840.114 [id number]

[id number] 



        18:22:00 22:21:00                 Acacia Mccarthy 350.1.13.10        

 



                                                [place] [address] [place] [address]  [phone]         



                                                        [facility]             

 

        2021 Emergency X       \[facility]\""    ERT     [id number]

4744 Univers



        18:22:00 18:22:00                 FOLUSHO                         ity [facility]

 

        2019 Phone                   nullFlavo MNA     [id number]

55 Memoria



        16:11:26 04:59:59 Message                 r       Neurosurger 08      l



                                                        y [facility]

 

        2019 Phone                   nullFlavo MNA     [id number]

55 Memoria



        16:11:26 04:59:59 Message                 r       Neurosurger 08      l



                                                        y [facility]

 

        2019 Outpatient                 MHMISCHER MHMISCHER 551

[id number] 



        11:11:26 23:59:59                                         08      

 

        2019 Phone                   nullFlavo MNA     [id number]

55 Memoria



        16:16:47 04:59:59 Message                 r       Neurosurger 07      l



                                                        y [facility]

 

        2019 Phone                   nullFlavo MNA     [id number]

55 Memoria



        16:16:47 04:59:59 Message                 r       Neurosurger 07      l



                                                        y [facility]

 

        2019 Outpatient                 MHMISCHER MISCHER 551

[id number] 



        11:16:47 23:59:59                                         07      

 

        2019-07-15 2019 Phone                   nullFlavo MNA     [id number]

55 Memoria



        15:52:03 04:59:59 Message                 r       Neurosurger 06      l



                                                        y [facility]

 

        2019-07-15 2019 Phone                   nullFlavo MNA     [id number]

55 Memoria



        15:52:03 04:59:59 Message                 r       Neurosurger 06      l



                                                        y [facility]

 

        2019-07-15 2019 Outpatient                 MHMISCHER MHMISCHER 551

[id number] 



        10:52:03 23:59:59                                         2019 Phone                   nullFlavo MNA     [id number]

55 Memoria



        18:55:03 04:59:59 Message                 r       Neurosurger 05      l



                                                        y [facility]

 

        2019 Phone                   nullFlavo MNA     [id number]

55 Memoria



        18:55:03 04:59:59 Message                 r       Neurosurger 05      l



                                                        y [facility]

 

        2019 Outpatient                 MHMISCHER MHMISCHER 551

[id number] 



        13:55:03 23:59:59                                         05      

 

        2018 Emergency                 [facility] [address]

62783 Memoria



        10:12:00 18:24:00                         r       [place] 12      [facility]

 

        2018 Emergency                 nullFlavo [facility] 45130

26527 Memoria



        10:12:00 18:24:00                         joanie       [address]

 

        2018 Outpatient         Vijay [facility]   5510

[id number] 



        04:12:00 12:24:00                 Deisy ARAUJO                  12      

 

        2018 Outpatient                 [facility]

[id number] Common



        11:15:00 11:15:00                         [place]   [address]         Spir

it



                                                Drive   [facility]

 

        2018 Outpatient                 [facility] [address]

[id number] Common



        11:30:00 11:30:00                         [place]   [address]         Spir

it



                                                Drive   [facility]

 

        2018 Outpatient                 [facility]

[id number] Common



        15:41:00 15:41:00                         [place]   [address]         Spir

it



                                                Drive   [facility]

 

        2018 Outpatient                 [facility]

[id number] Common



        15:42:00 15:42:00                         [place]   [address]         Spir

it



                                                Drive   [facility]

 

        2018 Outpatient                 [facility] [address]

[id number] Common



        11:00:00 11:00:00                         [place]   [address]         Spir

it



                                                Drive   [facility]

 

        2018 Inpatient                 nullFlavo [facility] 20951

[id number] Memoria



        22:40:00 17:28:00                         r       [place] 23      [facility]

 

        2018 Inpatient                 [facility] [address]166

50507 Memoria



        22:40:00 17:28:00                         joanie Garcia 23      [facility]

 

        2018 Outpatient         Deedee Reinoso [facility]   [id number]

[id number] 



        17:40:00 12:28:00                 Jamie                   23      







Results







           Test Description Test Time  Test Comments Results    Result Comments 

Source









                    POCT HEMOGLOBIN A1C TEST 2022 19:04:00 









                      Test Item  Value      Reference Range Interpretation Comme

nts









             POCT HBA1C (test code = 4548-4) 6.8 %        4-6          A        

    

 

             Lab Interpretation (test code = 01932-5) Abnormal                  

             



[facility] HEMOGLOBIN A1C [id number] 19:04:00





             Test Item    Value        Reference Range Interpretation Comments

 

             POCT HBA1C (test code = 4548-4) 6.8 %        4-6          A        

    

 

             Lab Interpretation (test code = Abnormal                           

    



             80994-9)                                            



[facility] HEMOGLOBIN A1C [id number] 19:04:00





             Test Item    Value        Reference Range Interpretation Comments

 

             POCT HBA1C (test code = 4548-4) 6.8 %        4-6          A        

    

 

             Lab Interpretation (test code = Abnormal                           

    



             01396-6)                                            



[facility] GLUCOSE (AUTOMATED)2022 23:04:10





             Test Item    Value        Reference Range Interpretation Comments

 

             POCT GLU (test code = 6325437254) 142 mg/dL           H      

      

 

             Lab Interpretation (test code = Abnormal                           

    



             70270-5)                                            



[facility] GLUCOSE (AUTOMATED)2022 18:07:54





             Test Item    Value        Reference Range Interpretation Comments

 

             POCT GLU (test code = 4475834289) 198 mg/dL           H      

      

 

             Lab Interpretation (test code = Abnormal                           

    



             16854-7)                                            



[facility] GLUCOSE (AUTOMATED)2022 13:54:10





             Test Item    Value        Reference Range Interpretation Comments

 

             POCT GLU (test code = 1216461292) 141 mg/dL           H      

      

 

             Lab Interpretation (test code = Abnormal                           

    



             45221-3)                                            



[facility] GLUCOSE (AUTOMATED)2022 02:47:37





             Test Item    Value        Reference Range Interpretation Comments

 

             POCT GLU (test code = 9408259980) 150 mg/dL           H      

      

 

             Lab Interpretation (test code = Abnormal                           

    



             39416-3)                                            



[facility] METABOLIC PANEL (40147)2022 
23:49:17





             Test Item    Value        Reference Range Interpretation Comments

 

             NA (test code = 139 mmol/L   135-145                   



             3324326399)                                         

 

             K (test code = 4.5 mmol/L   3.5-5.0                   



             3411164246)                                         

 

             CL (test code = 104 mmol/L                       



             7628719472)                                         

 

             CO2 TOTAL (test code = 25 mmol/L    23-31                     



             5935617603)                                         

 

             AGAP (test code =              2-16                      



             4720829956)                                         

 

             BUN (test code = 13 mg/dL     7-23                      



             4029878578)                                         

 

             GLUCOSE (test code = 228 mg/dL           H            



             7864490800)                                         

 

             CREATININE (test code = 0.56 mg/dL   0.60-1.25    L            



             1034947491)                                         

 

             TOTAL BILI (test code = 0.6 mg/dL    0.1-1.1                   



             5306691774)                                         

 

             CALCIUM (test code = 9.7 mg/dL    8.6-10.6                  



             9753994953)                                         

 

             T PROTEIN (test code = 7.8 g/dL     6.3-8.2                   



             6456937314)                                         

 

             ALBUMIN (test code = 4.4 g/dL     3.5-5.0                   



             8154466570)                                         

 

             ALK PHOS (test code = 132 U/L             H            



             7497429090)                                         

 

             ALTv (test code = 31 U/L       5-50                      



             1742-6)                                             

 

             AST(SGOT) (test code = 20 U/L       13-40                     



             4639473218)                                         

 

             eGFR (test code =              mL/min/1.73m2              



             7818934544)                                         

 

             MAL (test code = MAL) Association of                           



                          Glomerular Filtration                           



                          Rate (GFR) and Staging                           



                          of Kidney Disease*                           



                          +---------------------                           



                          --+-------------------                           



                          --+-------------------                           



                          ------+| GFR                           



                          (mL/min/1.73 m2) ?|                           



                          With Kidney Damage ?|                           



                          ?Without Kidney                           



                          Damage+---------------                           



                          --------+-------------                           



                          --------+-------------                           



                          ------------+| ?>90 ?                           



                          ? ? ? ? ? ? ? ?|                           



                          ?Stage one ? ? ? ? ?|                           



                          ? Normal ? ? ? ? ? ? ?                           



                          ?+--------------------                           



                          ---+------------------                           



                          ---+------------------                           



                          -------+| ?60-89 ? ? ?                           



                          ? ? ? ? ?| ?Stage two                           



                          ? ? ? ? ?| ? Decreased                           



                          GFR ? ? ? ?                            



                          +---------------------                           



                          --+-------------------                           



                          --+-------------------                           



                          ------+| ?30-59 ? ? ?                           



                          ? ? ? ? ?| ?Stage                           



                          three ? ? ? ?| ? Stage                           



                          three ? ? ? ? ?                           



                          +---------------------                           



                          --+-------------------                           



                          --+-------------------                           



                          ------+| ?15-29 ? ? ?                           



                          ? ? ? ? ?| ?Stage four                           



                          ? ? ? ? | ? Stage four                           



                          ? ? ? ? ?                              



                          ?+--------------------                           



                          ---+------------------                           



                          ---+------------------                           



                          -------+| ?<15 (or                           



                          dialysis) ? ?| ?Stage                           



                          five ? ? ? ? | ? Stage                           



                          five ? ? ? ? ?                           



                          ?+--------------------                           



                          ---+------------------                           



                          ---+------------------                           



                          -------+ *Each stage                           



                          assumes the associated                           



                          GFR level has been in                           



                          effect for at least                           



                          three months. ?Stages                           



                          1 to 5, with or                           



                          without kidney                           



                          disease, indicate                           



                          chronic kidney                           



                          disease. Notes:                           



                          Determination of                           



                          stages one and two                           



                          (with eGFR                             



                          >59mL/min/1.73 m2)                           



                          requires estimation of                           



                          kidney damage for at                           



                          least three months as                           



                          defined by structural                           



                          or functional                           



                          abnormalities of the                           



                          kidney, manifested by                           



                          either:Pathological                           



                          abnormalities or                           



                          Markers of kidney                           



                          damage (including                           



                          abnormalities in the                           



                          composition of the                           



                          blood or urine or                           



                          abnormalities in                           



                          imaging tests).                           

 

             Lab Interpretation Abnormal                               



             (test code = 52007-4)                                        



[facility] WITH [id number] 23:45:35





             Test Item    Value        Reference Range Interpretation Comments

 

             WBC (test code =              See_Comment                [Automated



             9390-2)                                             message] The sy

stem



                                                                 which generated



                                                                 this result



                                                                 transmitted



                                                                 reference range

:



                                                                 4.20 - 10.70



                                                                 10*3/?L. The



                                                                 reference range

 was



                                                                 not used to



                                                                 interpret this



                                                                 result as



                                                                 normal/abnormal

.

 

             RBC (test code =              See_Comment  H             [Automated



             789-8)                                              message] The sy

stem



                                                                 which generated



                                                                 this result



                                                                 transmitted



                                                                 reference range

:



                                                                 4.26 - 5.52



                                                                 10*6/?L. The



                                                                 reference range

 was



                                                                 not used to



                                                                 interpret this



                                                                 result as



                                                                 normal/abnormal

.

 

             HGB (test code = 16.4 g/dL    12.2-16.4                 



             718-7)                                              

 

             HCT (test code = 49.3 %       38.4-49.3                 



             4544-3)                                             

 

             MCV (test code = 83.6 fL      81.7-95.6                 



             787-2)                                              

 

             MCH (test code = 27.8 pg      26.1-32.7                 



             785-6)                                              

 

             MCHC (test code = 33.3 g/dL    31.2-35.0                 



             786-4)                                              

 

             RDW-SD (test code = 45.1 fL      38.5-51.6                 



             67484-0)                                            

 

             RDW-CV (test code = 14.9 %       12.1-15.4                 



             788-0)                                              

 

             PLT (test code =              See_Comment                [Automated



             777-3)                                              message] The sy

stem



                                                                 which generated



                                                                 this result



                                                                 transmitted



                                                                 reference range

:



                                                                 150 - 328 10*3/

?L.



                                                                 The reference r

zhen



                                                                 was not used to



                                                                 interpret this



                                                                 result as



                                                                 normal/abnormal

.

 

             MPV (test code = 10.9 fL      9.8-13.0                  



             52953-2)                                            

 

             IPF % (test code = 9.9 %        1.2-10.7                  Platelet 

count



             [id number])                                         measured by



                                                                 fluorescence



                                                                 method.

 

             NRBC/100 WBC (test              See_Comment                [Automat

ed



             code = 2760793035)                                        message] 

The system



                                                                 which generated



                                                                 this result



                                                                 transmitted



                                                                 reference range

:



                                                                 0.0 - 10.0 /100



                                                                 WBCs. The refer

ence



                                                                 range was not u

sed



                                                                 to interpret th

is



                                                                 result as



                                                                 normal/abnormal

.

 

             NRBC x10^3 (test code              See_Comment                [Auto

mated



             = 7310424998)                                        message] The s

ystem



                                                                 which generated



                                                                 this result



                                                                 transmitted



                                                                 reference range

:



                                                                 10*3/?L. The



                                                                 reference range

 was



                                                                 not used to



                                                                 interpret this



                                                                 result as



                                                                 normal/abnormal

.

 

             GRAN MAT (NEUT) % 65.4 %                                 



             (test code = 770-8)                                        

 

             IMM GRAN % (test code 0.60 %                                 



             = 4253425380)                                        

 

             LYMPH % (test code = 23.1 %                                 



             736-9)                                              

 

             MONO % (test code = 7.9 %                                  



             5905-5)                                             

 

             EOS % (test code = 2.6 %                                  



             713-8)                                              

 

             BASO % (test code = 0.4 %                                  



             706-2)                                              

 

             GRAN MAT x10^3(ANC) 6.34 10*3/uL 1.99-6.95                 



             (test code =                                        



             6458865522)                                         

 

             IMM GRAN x10^3 (test 0.06 10*3/uL 0.00-0.06                 



             code = 2907942912)                                        

 

             LYMPH x10^3 (test code 2.24 10*3/uL 1.09-3.23                 



             = 731-0)                                            

 

             MONO x10^3 (test code 0.77 10*3/uL 0.36-1.02                 



             = 742-7)                                            

 

             EOS x10^3 (test code = 0.25 10*3/uL 0.06-0.53                 



             711-2)                                              

 

             BASO x10^3 (test code 0.04 10*3/uL 0.01-0.09                 



             = 704-7)                                            

 

             Lab Interpretation Abnormal                               



             (test code = 98256-8)                                        



[id number]





             Test Item    Value        Reference Range Interpretation Comments

 

             LACTIC ACID (test code = 3.00 mmol/L  0.50-2.20    H            



             1318127612)                                         

 

             Lab Interpretation (test code = Abnormal                           

    



             57216-6)                                            



[id number] 01:28:46





             Test Item    Value        Reference Range Interpretation Comments

 

             WBC (test code =              See_Comment                [Automated



             6690-2)                                             message] The sy

stem



                                                                 which generated



                                                                 this result



                                                                 transmitted



                                                                 reference range

:



                                                                 4.20 - 10.70



                                                                 10*3/?L. The



                                                                 reference range

 was



                                                                 not used to



                                                                 interpret this



                                                                 result as



                                                                 normal/abnormal

.

 

             RBC (test code =              See_Comment  H             [Automated



             789-8)                                              message] The sy

stem



                                                                 which generated



                                                                 this result



                                                                 transmitted



                                                                 reference range

:



                                                                 4.26 - 5.52



                                                                 10*6/?L. The



                                                                 reference range

 was



                                                                 not used to



                                                                 interpret this



                                                                 result as



                                                                 normal/abnormal

.

 

             HGB (test code = 17.4 g/dL    12.2-16.4    H            



             718-7)                                              

 

             HCT (test code = 52.0 %       38.4-49.3    H            



             4544-3)                                             

 

             MCV (test code = 85.5 fL      81.7-95.6                 



             787-2)                                              

 

             MCH (test code = 28.6 pg      26.1-32.7                 



             785-6)                                              

 

             MCHC (test code = 33.5 g/dL    31.2-35.0                 



             786-4)                                              

 

             RDW-SD (test code = 47.6 fL      38.5-51.6                 



             34506-0)                                            

 

             RDW-CV (test code = 15.4 %       12.1-15.4                 



             788-0)                                              

 

             PLT (test code =              See_Comment  L             [Automated



             777-3)                                              message] The sy

stem



                                                                 which generated



                                                                 this result



                                                                 transmitted



                                                                 reference range

:



                                                                 150 - 328 10*3/

?L.



                                                                 The reference r

zhen



                                                                 was not used to



                                                                 interpret this



                                                                 result as



                                                                 normal/abnormal

.

 

             MPV (test code = 10.6 fL      9.8-13.0                  



             19904-9)                                            

 

             NRBC/100 WBC (test              See_Comment                [Automat

ed



             code = 3847919425)                                        message] 

The system



                                                                 which generated



                                                                 this result



                                                                 transmitted



                                                                 reference range

:



                                                                 0.0 - 10.0 /100



                                                                 WBCs. The refer

ence



                                                                 range was not u

sed



                                                                 to interpret th

is



                                                                 result as



                                                                 normal/abnormal

.

 

             NRBC x10^3 (test code              See_Comment                [Auto

mated



             = 9068811456)                                        message] The s

ystem



                                                                 which generated



                                                                 this result



                                                                 transmitted



                                                                 reference range

:



                                                                 10*3/?L. The



                                                                 reference range

 was



                                                                 not used to



                                                                 interpret this



                                                                 result as



                                                                 normal/abnormal

.

 

             GRAN MAT (NEUT) % 70.8 %                                 



             (test code = 770-8)                                        

 

             IMM GRAN % (test code 0.50 %                                 



             = 8862613738)                                        

 

             LYMPH % (test code = 20.4 %                                 



             736-9)                                              

 

             MONO % (test code = 5.9 %                                  



             5905-5)                                             

 

             EOS % (test code = 2.0 %                                  



             713-8)                                              

 

             BASO % (test code = 0.4 %                                  



             706-2)                                              

 

             GRAN MAT x10^3(ANC) 7.23 10*3/uL 1.99-6.95    H            



             (test code =                                        



             1756167619)                                         

 

             IMM GRAN x10^3 (test 0.05 10*3/uL 0.00-0.06                 



             code = 2484609393)                                        

 

             LYMPH x10^3 (test code 2.08 10*3/uL 1.09-3.23                 



             = 731-0)                                            

 

             MONO x10^3 (test code 0.60 10*3/uL 0.36-1.02                 



             = 742-7)                                            

 

             EOS x10^3 (test code = 0.20 10*3/uL 0.06-0.53                 



             711-2)                                              

 

             BASO x10^3 (test code 0.04 10*3/uL 0.01-0.09                 



             = 704-7)                                            

 

             Lab Interpretation Abnormal                               



             (test code = 29041-9)                                        



[facility]. METABOLIC PANEL ([id number])2022-10-30 
01:23:44





             Test Item    Value        Reference Range Interpretation Comments

 

             NA (test code = 141 mmol/L   135-145                   



             6250111970)                                         

 

             K (test code = 4.7 mmol/L   3.5-5.0                   



             9824816855)                                         

 

             CL (test code = 107 mmol/L                       



             5547745288)                                         

 

             CO2 TOTAL (test code = 23 mmol/L    23-31                     



             6456476481)                                         

 

             AGAP (test code =              2-16                      



             0810477304)                                         

 

             BUN (test code = 16 mg/dL     7-23                      



             6211519387)                                         

 

             GLUCOSE (test code = 144 mg/dL           H            



             0649290685)                                         

 

             CREATININE (test code = 0.54 mg/dL   0.60-1.25    L            



             2985650196)                                         

 

             TOTAL BILI (test code = 0.6 mg/dL    0.1-1.1                   



             8795206567)                                         

 

             CALCIUM (test code = 9.5 mg/dL    8.6-10.6                  



             1072051947)                                         

 

             T PROTEIN (test code = 7.7 g/dL     6.3-8.2                   



             6841557150)                                         

 

             ALBUMIN (test code = 4.4 g/dL     3.5-5.0                   



             8193299455)                                         

 

             ALK PHOS (test code = 101 U/L                          



             7588095365)                                         

 

             ALTv (test code = 28 U/L       5-50                      



             1742-6)                                             

 

             AST(SGOT) (test code = 20 U/L       13-40                     



             0737382964)                                         

 

             eGFR (test code =              mL/min/1.73m2              



             3690277664)                                         

 

             MAL (test code = MAL) [employer] of                           



                          Glomerular Filtration                           



                          Rate (GFR) and Staging                           



                          of Kidney Disease*                           



                          +---------------------                           



                          --+-------------------                           



                          --+-------------------                           



                          ------+| GFR                           



                          (mL/min/1.73 m2) ?|                           



                          With Kidney Damage ?|                           



                          ?Without Kidney                           



                          Damage+---------------                           



                          --------+-------------                           



                          --------+-------------                           



                          ------------+| ?>90 ?                           



                          ? ? ? ? ? ? ? ?|                           



                          ?Stage one ? ? ? ? ?|                           



                          ? Normal ? ? ? ? ? ? ?                           



                          ?+--------------------                           



                          ---+------------------                           



                          ---+------------------                           



                          -------+| ?60-89 ? ? ?                           



                          ? ? ? ? ?| ?Stage two                           



                          ? ? ? ? ?| ? Decreased                           



                          GFR ? ? ? ?                            



                          +---------------------                           



                          --+-------------------                           



                          --+-------------------                           



                          ------+| ?30-59 ? ? ?                           



                          ? ? ? ? ?| ?Stage                           



                          three ? ? ? ?| ? Stage                           



                          three ? ? ? ? ?                           



                          +---------------------                           



                          --+-------------------                           



                          --+-------------------                           



                          ------+| ?15-29 ? ? ?                           



                          ? ? ? ? ?| ?Stage four                           



                          ? ? ? ? | ? Stage four                           



                          ? ? ? ? ?                              



                          ?+--------------------                           



                          ---+------------------                           



                          ---+------------------                           



                          -------+| ?<15 (or                           



                          dialysis) ? ?| ?Stage                           



                          five ? ? ? ? | ? Stage                           



                          five ? ? ? ? ?                           



                          ?+--------------------                           



                          ---+------------------                           



                          ---+------------------                           



                          -------+ *Each stage                           



                          assumes the associated                           



                          GFR level has been in                           



                          effect for at least                           



                          three months. ?Stages                           



                          1 to 5, with or                           



                          without kidney                           



                          disease, indicate                           



                          chronic kidney                           



                          disease. Notes:                           



                          Determination of                           



                          stages one and two                           



                          (with eGFR                             



                          >59mL/min/1.73 m2)                           



                          requires estimation of                           



                          kidney damage for at                           



                          least three months as                           



                          defined by structural                           



                          or functional                           



                          abnormalities of the                           



                          kidney, manifested by                           



                          either:Pathological                           



                          abnormalities or                           



                          Markers of kidney                           



                          damage (including                           



                          abnormalities in the                           



                          composition of the                           



                          blood or urine or                           



                          abnormalities in                           



                          imaging tests).                           

 

             Lab Interpretation Abnormal                               



             (test code = 06524-6)                                        



[facility]





             Test Item    Value        Reference Range Interpretation Comments

 

             LIPASE (test code = 6801407621) 99 U/L       0-220                 

    

 

             Lab Interpretation (test code = Normal                             

    



             61647-2)                                            



[facility]16:26





             Test Item    Value        Reference Range Interpretation Comments

 

             POCT GLU (test code = 4513831932) 162 mg/dL           H      

      

 

             Lab Interpretation (test code = Abnormal                           

    



             72380-8)                                            



[facility] GLUCOSE (AUTOMATED)2022-10-23 01:23:12





             Test Item    Value        Reference Range Interpretation Comments

 

             POCT GLU (test code = 5149539807) 248 mg/dL           H      

      

 

             Lab Interpretation (test code = Abnormal                           

    



             33629-6)                                            



[facility] GLUCOSE (AUTOMATED)2022-10-22 21:32:18





             Test Item    Value        Reference Range Interpretation Comments

 

             POCT GLU (test code = 9790177141) 239 mg/dL           H      

      

 

             Lab Interpretation (test code = Abnormal                           

    



             37842-6)                                            



[facility] GLUCOSE (AUTOMATED)2022-10-22 01:49:48





             Test Item    Value        Reference Range Interpretation Comments

 

             POCT GLU (test code = 3742930602) 317 mg/dL           H      

      

 

             Lab Interpretation (test code = Abnormal                           

    



             44048-1)                                            



[facility] GLUCOSE (AUTOMATED)2022-10-21 21:38:03





             Test Item    Value        Reference Range Interpretation Comments

 

             POCT GLU (test code = 7214981155) 139 mg/dL           H      

      

 

             Lab Interpretation (test code = Abnormal                           

    



             92564-1)                                            



[facility] GLUCOSE (AUTOMATED)2022-10-21 16:14:31





             Test Item    Value        Reference Range Interpretation Comments

 

             POCT GLU (test code = 0780837700) 164 mg/dL           H      

      

 

             Lab Interpretation (test code = Abnormal                           

    



             42224-3)                                            



[facility] GLUCOSE (AUTOMATED)2022-10-21 12:46:40





             Test Item    Value        Reference Range Interpretation Comments

 

             POCT GLU (test code = 5666954478) 154 mg/dL           H      

      

 

             Lab Interpretation (test code = Abnormal                           

    



             51448-7)                                            



[facility]52





             Test Item    Value        Reference Range Interpretation Comments

 

             LIPASE (test code = 1282826446) 216 U/L      0-220                 

    

 

             Lab Interpretation (test code = Normal                             

    



             00892-9)                                            



[facility] METABOLIC PANEL ([id number])2022-10-21 
06:39:52





             Test Item    Value        Reference Range Interpretation Comments

 

             NA (test code = 139 mmol/L   135-145                   



             8531858538)                                         

 

             K (test code = 4.7 mmol/L   3.5-5                     



             3887090659)                                         

 

             CL (test code = 106 mmol/L                       



             5232899955)                                         

 

             CO2 TOTAL (test code = 20 mmol/L    23-31        L            



             1044047550)                                         

 

             AGAP (test code =              2-16                      



             0204660488)                                         

 

             BUN (test code = 16 mg/dL     7-23                      



             4933071494)                                         

 

             GLUCOSE (test code = 224 mg/dL           H            



             2034539562)                                         

 

             CREATININE (test code = 0.72 mg/dL   0.6-1.25                  



             7106747944)                                         

 

             TOTAL BILI (test code = 0.4 mg/dL    0.1-1.1                   



             2225172592)                                         

 

             CALCIUM (test code = 9.3 mg/dL    8.6-10.6                  



             2875200759)                                         

 

             T PROTEIN (test code = 7.2 g/dL     6.3-8.2                   



             9517792923)                                         

 

             ALBUMIN (test code = 4.1 g/dL     3.5-5                     



             9074437451)                                         

 

             ALK PHOS (test code = 118 U/L                          



             2263464074)                                         

 

             ALTv (test code = 46 U/L       5-50                      



             1742-6)                                             

 

             AST(SGOT) (test code = 18 U/L       13-40                     



             4298041782)                                         

 

             eGFR (test code =              mL/min/1.73m2              



             0865829040)                                         

 

             MAL (test code = MAL) Association of                           



                          Glomerular Filtration                           



                          Rate (GFR) and Staging                           



                          of Kidney Disease*                           



                          +---------------------                           



                          --+-------------------                           



                          --+-------------------                           



                          ------+| GFR                           



                          (mL/min/1.73 m2) ?|                           



                          With Kidney Damage ?|                           



                          ?Without Kidney                           



                          Damage+---------------                           



                          --------+-------------                           



                          --------+-------------                           



                          ------------+| ?>90 ?                           



                          ? ? ? ? ? ? ? ?|                           



                          ?Stage one ? ? ? ? ?|                           



                          ? Normal ? ? ? ? ? ? ?                           



                          ?+--------------------                           



                          ---+------------------                           



                          ---+------------------                           



                          -------+| ?60-89 ? ? ?                           



                          ? ? ? ? ?| ?Stage two                           



                          ? ? ? ? ?| ? Decreased                           



                          GFR ? ? ? ?                            



                          +---------------------                           



                          --+-------------------                           



                          --+-------------------                           



                          ------+| ?30-59 ? ? ?                           



                          ? ? ? ? ?| ?Stage                           



                          three ? ? ? ?| ? Stage                           



                          three ? ? ? ? ?                           



                          +---------------------                           



                          --+-------------------                           



                          --+-------------------                           



                          ------+| ?15-29 ? ? ?                           



                          ? ? ? ? ?| ?Stage four                           



                          ? ? ? ? | ? Stage four                           



                          ? ? ? ? ?                              



                          ?+--------------------                           



                          ---+------------------                           



                          ---+------------------                           



                          -------+| ?<15 (or                           



                          dialysis) ? ?| ?Stage                           



                          five ? ? ? ? | ? Stage                           



                          five ? ? ? ? ?                           



                          ?+--------------------                           



                          ---+------------------                           



                          ---+------------------                           



                          -------+ *Each stage                           



                          assumes the associated                           



                          GFR level has been in                           



                          effect for at least                           



                          three months. ?Stages                           



                          1 to 5, with or                           



                          without kidney                           



                          disease, indicate                           



                          chronic kidney                           



                          disease. Notes:                           



                          Determination of                           



                          stages one and two                           



                          (with eGFR                             



                          >59mL/min/1.73 m2)                           



                          requires estimation of                           



                          kidney damage for at                           



                          least three months as                           



                          defined by structural                           



                          or functional                           



                          abnormalities of the                           



                          kidney, manifested by                           



                          either:Pathological                           



                          abnormalities or                           



                          Markers of kidney                           



                          damage (including                           



                          abnormalities in the                           



                          composition of the                           



                          blood or urine or                           



                          abnormalities in                           



                          imaging tests).                           

 

             Lab Interpretation Abnormal                               



             (test code = 30932-1)                                        



[facility] WITH DIFF2022-10-21 06:28:46





             Test Item    Value        Reference Range Interpretation Comments

 

             WBC (test code =              See_Comment                [Automated



             4091-2)                                             message] The sy

stem



                                                                 which generated



                                                                 this result



                                                                 transmitted



                                                                 reference range

:



                                                                 4.20 - 10.70



                                                                 10*3/?L. The



                                                                 reference range

 was



                                                                 not used to



                                                                 interpret this



                                                                 result as



                                                                 normal/abnormal

.

 

             RBC (test code =              See_Comment                [Automated



             889-8)                                              message] The sy

stem



                                                                 which generated



                                                                 this result



                                                                 transmitted



                                                                 reference range

:



                                                                 4.26 - 5.52



                                                                 10*6/?L. The



                                                                 reference range

 was



                                                                 not used to



                                                                 interpret this



                                                                 result as



                                                                 normal/abnormal

.

 

             HGB (test code = 15.6 g/dL    12.2-16.4                 



             718-7)                                              

 

             HCT (test code = 46.3 %       38.4-49.3                 



             4544-3)                                             

 

             MCV (test code = 84.6 fL      81.7-95.6                 



             787-2)                                              

 

             MCH (test code = 28.5 pg      26.1-32.7                 



             785-6)                                              

 

             MCHC (test code = 33.7 g/dL    31.2-35                   



             786-4)                                              

 

             RDW-SD (test code = 45.7 fL      38.5-51.6                 



             05932-6)                                            

 

             RDW-CV (test code = 14.8 %       12.1-15.4                 



             788-0)                                              

 

             PLT (test code =              See_Comment  H             [Automated



             697-3)                                              message] The sy

stem



                                                                 which generated



                                                                 this result



                                                                 transmitted



                                                                 reference range

:



                                                                 150 - 328 10*3/

?L.



                                                                 The reference r

zhen



                                                                 was not used to



                                                                 interpret this



                                                                 result as



                                                                 normal/abnormal

.

 

             MPV (test code = 11.0 fL      9.8-13                    



             06017-4)                                            

 

             NRBC/100 WBC (test              See_Comment                [Automat

ed



             code = 5232079596)                                        message] 

The system



                                                                 which generated



                                                                 this result



                                                                 transmitted



                                                                 reference range

:



                                                                 0.0 - 10.0 /100



                                                                 WBCs. The refer

ence



                                                                 range was not u

sed



                                                                 to interpret th

is



                                                                 result as



                                                                 normal/abnormal

.

 

             NRBC x10^3 (test code              See_Comment                [Auto

mated



             = 9310551747)                                        message] The s

ystem



                                                                 which generated



                                                                 this result



                                                                 transmitted



                                                                 reference range

:



                                                                 10*3/?L. The



                                                                 reference range

 was



                                                                 not used to



                                                                 interpret this



                                                                 result as



                                                                 normal/abnormal

.

 

             GRAN MAT (NEUT) % 56.7 %                                 



             (test code = 770-8)                                        

 

             IMM GRAN % (test code 0.70 %                                 



             = 6895180876)                                        

 

             LYMPH % (test code = 31.5 %                                 



             736-9)                                              

 

             MONO % (test code = 8.6 %                                  



             5905-5)                                             

 

             EOS % (test code = 2.0 %                                  



             713-8)                                              

 

             BASO % (test code = 0.5 %                                  



             706-2)                                              

 

             GRAN MAT x10^3(ANC) 4.84 10*3/uL 1.99-6.95                 



             (test code =                                        



             1357457738)                                         

 

             IMM GRAN x10^3 (test 0.06 10*3/uL 0-0.06                    



             code = 8830700816)                                        

 

             LYMPH x10^3 (test code 2.69 10*3/uL 1.09-3.23                 



             = 731-0)                                            

 

             MONO x10^3 (test code 0.73 10*3/uL 0.36-1.02                 



             = 742-7)                                            

 

             EOS x10^3 (test code = 0.17 10*3/uL 0.06-0.53                 



             711-2)                                              

 

             BASO x10^3 (test code 0.04 10*3/uL 0.01-0.09                 



             = 704-7)                                            

 

             Lab Interpretation Abnormal                               



             (test code = 73292-1)                                        



[facility] CULTURE(AEROBIC/ANAEROBIC)2022 
20:34:19





             Test Item    Value        Reference Range Interpretation Comments

 

             TISSUE CULTURE (test No aerobic/anaerobic                          

 



             code = 20474-3) organisms isolated                           

 

             Gram stain (test code No PMNs or Mononuclear                       

    



             = 664-3)     cells observed                           



[facility] GLUCOSE (AUTOMATED)2022 18:36:41





             Test Item    Value        Reference Range Interpretation Comments

 

             POCT GLU (test code = 6252977094) 162 mg/dL           H      

      

 

             Lab Interpretation (test code = Abnormal                           

    



             51667-7)                                            



[facility] GLUCOSE (AUTOMATED)2022 14:48:29





             Test Item    Value        Reference Range Interpretation Comments

 

             POCT GLU (test code = 8698599990) 191 mg/dL           H      

      

 

             Lab Interpretation (test code = Abnormal                           

    



             33833-4)                                            



[facility] GLUCOSE (AUTOMATED)2022 10:56:47





             Test Item    Value        Reference Range Interpretation Comments

 

             POCT GLU (test code = 4924158017) 242 mg/dL           H      

      

 

             Lab Interpretation (test code = Abnormal                           

    



             12257-1)                                            



[facility] GLUCOSE (AUTOMATED)2022 05:47:30





             Test Item    Value        Reference Range Interpretation Comments

 

             POCT GLU (test code = 0167390185) 327 mg/dL           H      

      

 

             Lab Interpretation (test code = Abnormal                           

    



             98017-6)                                            



[facility] GLUCOSE (AUTOMATED)2022 02:18:34





             Test Item    Value        Reference Range Interpretation Comments

 

             POCT GLU (test code = 2445518471) 309 mg/dL           H      

      

 

             Lab Interpretation (test code = Abnormal                           

    



             48235-0)                                            



[facility] GLUCOSE (AUTOMATED)2022 23:17:38





             Test Item    Value        Reference Range Interpretation Comments

 

             POCT GLU (test code = 7907537984) 260 mg/dL           H      

      

 

             Lab Interpretation (test code = Abnormal                           

    



             84870-9)                                            



[facility] GLUCOSE (AUTOMATED)2022 18:33:41





             Test Item    Value        Reference Range Interpretation Comments

 

             POCT GLU (test code = 4944888612) 264 mg/dL           H      

      

 

             Lab Interpretation (test code = Abnormal                           

    



             39750-6)                                            



[facility] GLUCOSE (AUTOMATED)2022 15:02:50





             Test Item    Value        Reference Range Interpretation Comments

 

             POCT GLU (test code = 6216963244) 219 mg/dL           H      

      

 

             Lab Interpretation (test code = Abnormal                           

    



             34178-3)                                            



[facility] METABOLIC PANEL (NA, K, CL, CO2, 
GLUCOSE, BUN, CREATININE, CA)2022 10:44:27





             Test Item    Value        Reference Range Interpretation Comments

 

             NA (test code = 132 mmol/L   135-145      L            



             2722158651)                                         

 

             K (test code = 4.4 mmol/L   3.5-5                     



             1596510809)                                         

 

             CL (test code = 103 mmol/L                       



             6062435925)                                         

 

             CO2 TOTAL (test code = 25 mmol/L    23-31                     



             1452770355)                                         

 

             AGAP (test code =              2-16                      



             2383799921)                                         

 

             BUN (test code = 15 mg/dL     7-23                      



             1738880764)                                         

 

             GLUCOSE (test code = 262 mg/dL           H            



             3828476926)                                         

 

             CREATININE (test code = 0.52 mg/dL   0.6-1.25     L            



             5414583860)                                         

 

             CALCIUM (test code = 8.3 mg/dL    8.6-10.6     L            



             0130170770)                                         

 

             eGFR (test code =              mL/min/1.73m2              



             5998993786)                                         

 

             MAL (test code = MAL) Association of                           



                          Glomerular Filtration                           



                          Rate (GFR) and Staging                           



                          of Kidney Disease*                           



                          +---------------------                           



                          --+-------------------                           



                          --+-------------------                           



                          ------+| GFR                           



                          (mL/min/1.73 m2) ?|                           



                          With Kidney Damage ?|                           



                          ?Without Kidney                           



                          Damage+---------------                           



                          --------+-------------                           



                          --------+-------------                           



                          ------------+| ?>90 ?                           



                          ? ? ? ? ? ? ? ?|                           



                          ?Stage one ? ? ? ? ?|                           



                          ? Normal ? ? ? ? ? ? ?                           



                          ?+--------------------                           



                          ---+------------------                           



                          ---+------------------                           



                          -------+| ?60-89 ? ? ?                           



                          ? ? ? ? ?| ?Stage two                           



                          ? ? ? ? ?| ? Decreased                           



                          GFR ? ? ? ?                            



                          +---------------------                           



                          --+-------------------                           



                          --+-------------------                           



                          ------+| ?30-59 ? ? ?                           



                          ? ? ? ? ?| ?Stage                           



                          three ? ? ? ?| ? Stage                           



                          three ? ? ? ? ?                           



                          +---------------------                           



                          --+-------------------                           



                          --+-------------------                           



                          ------+| ?15-29 ? ? ?                           



                          ? ? ? ? ?| ?Stage four                           



                          ? ? ? ? | ? Stage four                           



                          ? ? ? ? ?                              



                          ?+--------------------                           



                          ---+------------------                           



                          ---+------------------                           



                          -------+| ?<15 (or                           



                          dialysis) ? ?| ?Stage                           



                          five ? ? ? ? | ? Stage                           



                          five ? ? ? ? ?                           



                          ?+--------------------                           



                          ---+------------------                           



                          ---+------------------                           



                          -------+ *Each stage                           



                          assumes the associated                           



                          GFR level has been in                           



                          effect for at least                           



                          three months. ?Stages                           



                          1 to 5, with or                           



                          without kidney                           



                          disease, indicate                           



                          chronic kidney                           



                          disease. Notes:                           



                          Determination of                           



                          stages one and two                           



                          (with eGFR                             



                          >59mL/min/1.73 m2)                           



                          requires estimation of                           



                          kidney damage for at                           



                          least three months as                           



                          defined by structural                           



                          or functional                           



                          abnormalities of the                           



                          kidney, manifested by                           



                          either:Pathological                           



                          abnormalities or                           



                          Markers of kidney                           



                          damage (including                           



                          abnormalities in the                           



                          composition of the                           



                          blood or urine or                           



                          abnormalities in                           



                          imaging tests).                           

 

             Lab Interpretation Abnormal                               



             (test code = 16505-4)                                        



[facility] WITH [id number] 10:22:05





             Test Item    Value        Reference Range Interpretation Comments

 

             WBC (test code =              See_Comment                [Automated



             6690-2)                                             message] The sy

stem



                                                                 which generated



                                                                 this result



                                                                 transmitted



                                                                 reference range

:



                                                                 4.20 - 10.70



                                                                 10*3/?L. The



                                                                 reference range

 was



                                                                 not used to



                                                                 interpret this



                                                                 result as



                                                                 normal/abnormal

.

 

             RBC (test code =              See_Comment  L             [Automated



             789-8)                                              message] The sy

stem



                                                                 which generated



                                                                 this result



                                                                 transmitted



                                                                 reference range

:



                                                                 4.26 - 5.52



                                                                 10*6/?L. The



                                                                 reference range

 was



                                                                 not used to



                                                                 interpret this



                                                                 result as



                                                                 normal/abnormal

.

 

             HGB (test code = 10.5 g/dL    12.2-16.4    L            



             718-7)                                              

 

             HCT (test code = 31.2 %       38.4-49.3    L            



             4544-3)                                             

 

             MCV (test code = 88.9 fL      81.7-95.6                 



             787-2)                                              

 

             MCH (test code = 29.9 pg      26.1-32.7                 



             785-6)                                              

 

             MCHC (test code = 33.7 g/dL    31.2-35                   



             786-4)                                              

 

             RDW-SD (test code = 49.8 fL      38.5-51.6                 



             17797-9)                                            

 

             RDW-CV (test code = 15.9 %       12.1-15.4    H            



             788-0)                                              

 

             PLT (test code =              See_Comment  H             [Automated



             777-3)                                              message] The sy

stem



                                                                 which generated



                                                                 this result



                                                                 transmitted



                                                                 reference range

:



                                                                 150 - 328 10*3/

?L.



                                                                 The reference r

zhen



                                                                 was not used to



                                                                 interpret this



                                                                 result as



                                                                 normal/abnormal

.

 

             MPV (test code = 10.4 fL      9.8-13                    



             48576-3)                                            

 

             NRBC/100 WBC (test              See_Comment                [Automat

ed



             code = 3557382857)                                        message] 

The system



                                                                 which generated



                                                                 this result



                                                                 transmitted



                                                                 reference range

:



                                                                 0.0 - 10.0 /100



                                                                 WBCs. The refer

ence



                                                                 range was not u

sed



                                                                 to interpret th

is



                                                                 result as



                                                                 normal/abnormal

.

 

             NRBC x10^3 (test code              See_Comment                [Auto

mated



             = 0117515017)                                        message] The s

ystem



                                                                 which generated



                                                                 this result



                                                                 transmitted



                                                                 reference range

:



                                                                 10*3/?L. The



                                                                 reference range

 was



                                                                 not used to



                                                                 interpret this



                                                                 result as



                                                                 normal/abnormal

.

 

             GRAN MAT (NEUT) % 59.8 %                                 



             (test code = 770-8)                                        

 

             IMM GRAN % (test code 1.20 %                                 



             = 6504785603)                                        

 

             LYMPH % (test code = 28.2 %                                 



             736-9)                                              

 

             MONO % (test code = 8.4 %                                  



             5905-5)                                             

 

             EOS % (test code = 2.2 %                                  



             713-8)                                              

 

             BASO % (test code = 0.2 %                                  



             706-2)                                              

 

             GRAN MAT x10^3(ANC) 5.34 10*3/uL 1.99-6.95                 



             (test code =                                        



             0631112903)                                         

 

             IMM GRAN x10^3 (test 0.11 10*3/uL 0-0.06       H            



             code = 2747052803)                                        

 

             LYMPH x10^3 (test code 2.52 10*3/uL 1.09-3.23                 



             = 731-0)                                            

 

             MONO x10^3 (test code 0.75 10*3/uL 0.36-1.02                 



             = 742-7)                                            

 

             EOS x10^3 (test code = 0.20 10*3/uL 0.06-0.53                 



             711-2)                                              

 

             BASO x10^3 (test code              0.01-0.09                 



             = 704-7)                                            

 

             Lab Interpretation Abnormal                               



             (test code = 38998-7)                                        



[facility] GLUCOSE (AUTOMATED)2022 02:20:10





             Test Item    Value        Reference Range Interpretation Comments

 

             POCT GLU (test code = 5452569009) 281 mg/dL           H      

      

 

             Lab Interpretation (test code = Abnormal                           

    



             39979-1)                                            



[facility] GLUCOSE (AUTOMATED)2022-08-10 22:27:37





             Test Item    Value        Reference Range Interpretation Comments

 

             POCT GLU (test code = 0824527469) 187 mg/dL           H      

      

 

             Lab Interpretation (test code = Abnormal                           

    



             23747-4)                                            



[facility] GLUCOSE (AUTOMATED)2022-08-10 19:00:16





             Test Item    Value        Reference Range Interpretation Comments

 

             POCT GLU (test code = 8091076594) 167 mg/dL           H      

      

 

             Lab Interpretation (test code = Abnormal                           

    



             60762-2)                                            



[facility] GLUCOSE (AUTOMATED)2022-08-10 19:00:16





             Test Item    Value        Reference Range Interpretation Comments

 

             POCT GLU (test code = 4008011001) 167 mg/dL           H      

      

 

             Lab Interpretation (test code = Abnormal                           

    



             12983-0)                                            



[facility] GLUCOSE (AUTOMATED)2022-08-10 15:22:04





             Test Item    Value        Reference Range Interpretation Comments

 

             POCT GLU (test code = 7268802031) 138 mg/dL           H      

      

 

             Lab Interpretation (test code = Abnormal                           

    



             88400-3)                                            



[facility] GLUCOSE (AUTOMATED)2022-08-10 15:22:04





             Test Item    Value        Reference Range Interpretation Comments

 

             POCT GLU (test code = 2476355384) 138 mg/dL           H      

      

 

             Lab Interpretation (test code = Abnormal                           

    



             68275-9)                                            



[facility] GLUCOSE (AUTOMATED)2022-08-10 02:45:31





             Test Item    Value        Reference Range Interpretation Comments

 

             POCT GLU (test code = 6679331214) 142 mg/dL           H      

      

 

             Lab Interpretation (test code = Abnormal                           

    



             51484-7)                                            



[facility] GLUCOSE (AUTOMATED)2022-08-10 02:45:31





             Test Item    Value        Reference Range Interpretation Comments

 

             POCT GLU (test code = 9288977369) 142 mg/dL           H      

      

 

             Lab Interpretation (test code = Abnormal                           

    



             25841-0)                                            



[facility] GLUCOSE (AUTOMATED)2022 22:04:41





             Test Item    Value        Reference Range Interpretation Comments

 

             POCT GLU (test code = 2603837749) 260 mg/dL           H      

      

 

             Lab Interpretation (test code = Abnormal                           

    



             12429-9)                                            



[facility] GLUCOSE (AUTOMATED)2022 22:04:41





             Test Item    Value        Reference Range Interpretation Comments

 

             POCT GLU (test code = 3664218590) 260 mg/dL           H      

      

 

             Lab Interpretation (test code = Abnormal                           

    



             05784-2)                                            



[facility]  
21:01:35





             Test Item    Value        Reference Range Interpretation Comments

 

             Blood Culture-Aerobic No organisms No growth                 Previo

us



             (test code = 17928-3) isolated                               prelim

inary



                                                                 verified result



                                                                 was Culture In



                                                                 Progress on



                                                                 2022 at 190

1



                                                                 CDTPrevious



                                                                 preliminary



                                                                 verified result



                                                                 was No growth a

t



                                                                 24 hours on



                                                                 2022 at 160

1



                                                                 CDTPrevious



                                                                 preliminary



                                                                 verified result



                                                                 was No growth a

t



                                                                 48 hours on



                                                                 2022 at 160

1



                                                                 CDTPrevious



                                                                 preliminary



                                                                 verified result



                                                                 was No growth a

t



                                                                 72 hours on



                                                                 2022 at 160

1



                                                                 CDT

 

             Blood        No organisms No growth                 Previous



             Culture-Anaerobic isolated                               preliminar

y



             (test code = 44324-6)                                        verifi

ed result



                                                                 was Culture In



                                                                 Progress on



                                                                 2022 at 190

1



                                                                 CDTPrevious



                                                                 preliminary



                                                                 verified result



                                                                 was No growth a

t



                                                                 24 hours on



                                                                 2022 at 160

1



                                                                 CDTPrevious



                                                                 preliminary



                                                                 verified result



                                                                 was No growth a

t



                                                                 48 hours on



                                                                 2022 at 160

1



                                                                 CDTPrevious



                                                                 preliminary



                                                                 verified result



                                                                 was No growth a

t



                                                                 72 hours on



                                                                 2022 at 160

1



                                                                 CDT

 

             Lab Interpretation Normal                                 



             (test code = 49480-0)                                        



[facility] Culture - Peripheral [id number] 
21:01:35





             Test Item    Value        Reference Range Interpretation Comments

 

             Blood Culture-Aerobic No organisms No growth                 Previo

us



             (test code = 17928-3) isolated                               prelim

inary



                                                                 verified result



                                                                 was Culture In



                                                                 Progress on



                                                                 2022 at 190

1



                                                                 CDTPrevious



                                                                 preliminary



                                                                 verified result



                                                                 was No growth a

t



                                                                 24 hours on



                                                                 2022 at 160

1



                                                                 CDTPrevious



                                                                 preliminary



                                                                 verified result



                                                                 was No growth a

t



                                                                 48 hours on



                                                                 2022 at 160

1



                                                                 CDTPrevious



                                                                 preliminary



                                                                 verified result



                                                                 was No growth a

t



                                                                 72 hours on



                                                                 2022 at 160

1



                                                                 CDT

 

             Blood        No organisms No growth                 Previous



             Culture-Anaerobic isolated                               preliminar

y



             (test code = 98632-7)                                        verifi

ed result



                                                                 was Culture In



                                                                 Progress on



                                                                 2022 at 190

1



                                                                 CDTPrevious



                                                                 preliminary



                                                                 verified result



                                                                 was No growth a

t



                                                                 24 hours on



                                                                 2022 at 160

1



                                                                 CDTPrevious



                                                                 preliminary



                                                                 verified result



                                                                 was No growth a

t



                                                                 48 hours on



                                                                 2022 at 160

1



                                                                 CDTPrevious



                                                                 preliminary



                                                                 verified result



                                                                 was No growth a

t



                                                                 72 hours on



                                                                 2022 at 160

1



                                                                 CDT

 

             Lab Interpretation Normal                                 



             (test code = 37979-2)                                        



[facility] Culture - Peripheral Vein #  
21:01:35





             Test Item    Value        Reference Range Interpretation Comments

 

             Blood Culture-Aerobic No organisms No growth                 Previo

us



             (test code = 17928-3) isolated                               prelim

inary



                                                                 verified result



                                                                 was Culture In



                                                                 Progress on



                                                                 2022 at 190

1



                                                                 CDTPrevious



                                                                 preliminary



                                                                 verified result



                                                                 was No growth a

t



                                                                 24 hours on



                                                                 2022 at 160

1



                                                                 CDTPrevious



                                                                 preliminary



                                                                 verified result



                                                                 was No growth a

t



                                                                 48 hours on



                                                                 2022 at 160

1



                                                                 CDTPrevious



                                                                 preliminary



                                                                 verified result



                                                                 was No growth a

t



                                                                 72 hours on



                                                                 2022 at 160

1



                                                                 CDT

 

             Blood        No organisms No growth                 Previous



             Culture-Anaerobic isolated                               preliminar

y



             (test code = 67608-0)                                        verifi

ed result



                                                                 was Culture In



                                                                 Progress on



                                                                 2022 at 190

1



                                                                 CDTPrevious



                                                                 preliminary



                                                                 verified result



                                                                 was No growth a

t



                                                                 24 hours on



                                                                 2022 at 160

1



                                                                 CDTPrevious



                                                                 preliminary



                                                                 verified result



                                                                 was No growth a

t



                                                                 48 hours on



                                                                 2022 at 160

1



                                                                 CDTPrevious



                                                                 preliminary



                                                                 verified result



                                                                 was No growth a

t



                                                                 72 hours on



                                                                 2022 at 160

1



                                                                 CDT

 

             Lab Interpretation Normal                                 



             (test code = 11865-8)                                        



[facility] Culture - Peripheral [id number] 
21:01:35





             Test Item    Value        Reference Range Interpretation Comments

 

             Blood Culture-Aerobic No organisms No growth                 Previo

us



             (test code = 17928-3) isolated                               prelim

inary



                                                                 verified result



                                                                 was Culture In



                                                                 Progress on



                                                                 2022 at 190

1



                                                                 CDTPrevious



                                                                 preliminary



                                                                 verified result



                                                                 was No growth a

t



                                                                 24 hours on



                                                                 2022 at 160

1



                                                                 CDTPrevious



                                                                 preliminary



                                                                 verified result



                                                                 was No growth a

t



                                                                 48 hours on



                                                                 2022 at 160

1



                                                                 CDTPrevious



                                                                 preliminary



                                                                 verified result



                                                                 was No growth a

t



                                                                 72 hours on



                                                                 2022 at 160

1



                                                                 CDT

 

             Blood        No organisms No growth                 Previous



             Culture-Anaerobic isolated                               preliminar

y



             (test code = 94575-4)                                        verifi

ed result



                                                                 was Culture In



                                                                 Progress on



                                                                 2022 at 190

1



                                                                 CDTPrevious



                                                                 preliminary



                                                                 verified result



                                                                 was No growth a

t



                                                                 24 hours on



                                                                 2022 at 160

1



                                                                 CDTPrevious



                                                                 preliminary



                                                                 verified result



                                                                 was No growth a

t



                                                                 48 hours on



                                                                 2022 at 160

1



                                                                 CDTPrevious



                                                                 preliminary



                                                                 verified result



                                                                 was No growth a

t



                                                                 72 hours on



                                                                 2022 at 160

1



                                                                 CDT

 

             Lab Interpretation Normal                                 



             (test code = 63272-3)                                        



[facility] GLUCOSE (AUTOMATED)2022 14:13:49





             Test Item    Value        Reference Range Interpretation Comments

 

             POCT GLU (test code = 1530446016) 176 mg/dL           H      

      

 

             Lab Interpretation (test code = Abnormal                           

    



             92322-4)                                            



[facility] 14:13:49





             Test Item    Value        Reference Range Interpretation Comments

 

             POCT GLU (test code = 0680935050) 176 mg/dL           H      

      

 

             Lab Interpretation (test code = Abnormal                           

    



             51933-8)                                            



[facility] GLUCOSE (AUTOMATED)2022 02:30:54





             Test Item    Value        Reference Range Interpretation Comments

 

             POCT GLU (test code = 2990472680) 113 mg/dL           H      

      

 

             Lab Interpretation (test code = Abnormal                           

    



             30793-2)                                            



[facility] GLUCOSE (AUTOMATED)2022 02:30:54





             Test Item    Value        Reference Range Interpretation Comments

 

             POCT GLU (test code = 9823715101) 113 mg/dL           H      

      

 

             Lab Interpretation (test code = Abnormal                           

    



             73679-6)                                            



[facility] GLUCOSE (AUTOMATED)2022 23:41:12





             Test Item    Value        Reference Range Interpretation Comments

 

             POCT GLU (test code = 7721262572) 135 mg/dL           H      

      

 

             Lab Interpretation (test code = Abnormal                           

    



             15713-7)                                            



[facility] GLUCOSE (AUTOMATED)2022 23:41:12





             Test Item    Value        Reference Range Interpretation Comments

 

             POCT GLU (test code = 6481711307) 135 mg/dL           H      

      

 

             Lab Interpretation (test code = Abnormal                           

    



             75528-1)                                            



[facility] GLUCOSE (AUTOMATED)2022 17:13:18





             Test Item    Value        Reference Range Interpretation Comments

 

             POCT GLU (test code = 4620218842) 238 mg/dL           H      

      

 

             Lab Interpretation (test code = Abnormal                           

    



             36768-3)                                            



[facility] GLUCOSE (AUTOMATED)2022 17:13:18





             Test Item    Value        Reference Range Interpretation Comments

 

             POCT GLU (test code = 5746875585) 238 mg/dL           H      

      

 

             Lab Interpretation (test code = Abnormal                           

    



             13625-3)                                            



[facility] GLUCOSE (AUTOMATED)2022 17:13:18





             Test Item    Value        Reference Range Interpretation Comments

 

             POCT GLU (test code = 8114142526) 238 mg/dL           H      

      

 

             Lab Interpretation (test code = Abnormal                           

    



             03537-7)                                            



[facility] [id number] 16:50:53





             Test Item    Value        Reference Range Interpretation Comments

 

             CRP (test code = 0.9 mg/dL    See_Comment  H             [Automated

 message]



             [id number])                                         The system [employer]



                                                                 generated this



                                                                 result transmit

huma



                                                                 reference range

:



                                                                 <=0.8. The refe

rence



                                                                 range was not u

sed



                                                                 to interpret th

is



                                                                 result as



                                                                 normal/abnormal

.

 

             Lab Interpretation (test Abnormal                               



             code = 48398-2)                                        



[facility] [id number] 16:50:53





             Test Item    Value        Reference Range Interpretation Comments

 

             CRP (test code = 0.9 mg/dL    See_Comment  H             [Automated

 message]



             [id number])                                         The system [employer]



                                                                 generated this



                                                                 result transmit

huma



                                                                 reference range

:



                                                                 <=0.8. The refe

rence



                                                                 range was not u

sed



                                                                 to interpret th

is



                                                                 result as



                                                                 normal/abnormal

.

 

             Lab Interpretation (test Abnormal                               



             code = 55331-0)                                        



[facility]ACT[facility] [id number] 16:50:53





             Test Item    Value        Reference Range Interpretation Comments

 

             CRP (test code = 0.9 mg/dL    See_Comment  H             [Automated

 message]



             [id number])                                         The system [employer]



                                                                 generated this



                                                                 result transmit

huma



                                                                 reference range

:



                                                                 <=0.8. The refe

rence



                                                                 range was not u

sed



                                                                 to interpret th

is



                                                                 result as



                                                                 normal/abnormal

.

 

             Lab Interpretation (test Abnormal                               



             code = 09223-8)                                        



[facility] GLUCOSE (AUTOMATED)2022 15:55:49





             Test Item    Value        Reference Range Interpretation Comments

 

             POCT GLU (test code = 1741666610) 209 mg/dL           H      

      

 

             Lab Interpretation (test code = Abnormal                           

    



             31853-9)                                            



[facility] GLUCOSE (AUTOMATED)2022 15:55:49





             Test Item    Value        Reference Range Interpretation Comments

 

             POCT GLU (test code = 7884250178) 209 mg/dL           H      

      

 

             Lab Interpretation (test code = Abnormal                           

    



             36637-3)                                            



[facility] GLUCOSE (AUTOMATED)2022 00:58:44





             Test Item    Value        Reference Range Interpretation Comments

 

             POCT GLU (test code = 5500570486) 300 mg/dL           H      

      

 

             Lab Interpretation (test code = Abnormal                           

    



             22049-0)                                            



[facility] GLUCOSE (AUTOMATED)2022 00:58:44





             Test Item    Value        Reference Range Interpretation Comments

 

             POCT GLU (test code = 2053848087) 300 mg/dL           H      

      

 

             Lab Interpretation (test code = Abnormal                           

    



             86231-1)                                            



[facility] GLUCOSE (AUTOMATED)2022 21:28:03





             Test Item    Value        Reference Range Interpretation Comments

 

             POCT GLU (test code = 3296040239) 119 mg/dL           H      

      

 

             Lab Interpretation (test code = Abnormal                           

    



             17894-6)                                            



[facility] GLUCOSE (AUTOMATED)2022 21:28:03





             Test Item    Value        Reference Range Interpretation Comments

 

             POCT GLU (test code = 0836167510) 119 mg/dL           H      

      

 

             Lab Interpretation (test code = Abnormal                           

    



             99939-3)                                            



[facility] GLUCOSE (AUTOMATED)2022 16:51:05





             Test Item    Value        Reference Range Interpretation Comments

 

             POCT GLU (test code = 9543747850) 275 mg/dL           H      

      

 

             Lab Interpretation (test code = Abnormal                           

    



             98587-0)                                            



[facility] GLUCOSE (AUTOMATED)2022 16:51:05





             Test Item    Value        Reference Range Interpretation Comments

 

             POCT GLU (test code = 5591755368) 275 mg/dL           H      

      

 

             Lab Interpretation (test code = Abnormal                           

    



             84780-8)                                            



[facility] METABOLIC PANEL (NA, K, CL, CO2, 
GLUCOSE, BUN, CREATININE, CA)2022 15:51:39





             Test Item    Value        Reference Range Interpretation Comments

 

             NA (test code = 136 mmol/L   135-145                   



             0721770872)                                         

 

             K (test code = 3.7 mmol/L   3.5-5                     



             6357216278)                                         

 

             CL (test code = 111 mmol/L          H            



             2316517042)                                         

 

             CO2 TOTAL (test code = 21 mmol/L    23-31        L            



             8787315802)                                         

 

             AGAP (test code =              2-16                      



             9936686305)                                         

 

             BUN (test code = 9 mg/dL      7-23                      



             9341916710)                                         

 

             GLUCOSE (test code = 278 mg/dL           H            



             3948545653)                                         

 

             CREATININE (test code = 0.46 mg/dL   0.6-1.25     L            



             8107540165)                                         

 

             CALCIUM (test code = 8.2 mg/dL    8.6-10.6     L            



             0765412410)                                         

 

             eGFR (test code =              mL/min/1.73m2              



             0065082937)                                         

 

             MAL (test code = MAL) Association of                           



                          Glomerular Filtration                           



                          Rate (GFR) and Staging                           



                          of Kidney Disease*                           



                          +---------------------                           



                          --+-------------------                           



                          --+-------------------                           



                          ------+| GFR                           



                          (mL/min/1.73 m2) ?|                           



                          With Kidney Damage ?|                           



                          ?Without Kidney                           



                          Damage+---------------                           



                          --------+-------------                           



                          --------+-------------                           



                          ------------+| ?>90 ?                           



                          ? ? ? ? ? ? ? ?|                           



                          ?Stage one ? ? ? ? ?|                           



                          ? Normal ? ? ? ? ? ? ?                           



                          ?+--------------------                           



                          ---+------------------                           



                          ---+------------------                           



                          -------+| ?60-89 ? ? ?                           



                          ? ? ? ? ?| ?Stage two                           



                          ? ? ? ? ?| ? Decreased                           



                          GFR ? ? ? ?                            



                          +---------------------                           



                          --+-------------------                           



                          --+-------------------                           



                          ------+| ?30-59 ? ? ?                           



                          ? ? ? ? ?| ?Stage                           



                          three ? ? ? ?| ? Stage                           



                          three ? ? ? ? ?                           



                          +---------------------                           



                          --+-------------------                           



                          --+-------------------                           



                          ------+| ?15-29 ? ? ?                           



                          ? ? ? ? ?| ?Stage four                           



                          ? ? ? ? | ? Stage four                           



                          ? ? ? ? ?                              



                          ?+--------------------                           



                          ---+------------------                           



                          ---+------------------                           



                          -------+| ?<15 (or                           



                          dialysis) ? ?| ?Stage                           



                          five ? ? ? ? | ? Stage                           



                          five ? ? ? ? ?                           



                          ?+--------------------                           



                          ---+------------------                           



                          ---+------------------                           



                          -------+ *Each stage                           



                          assumes the associated                           



                          GFR level has been in                           



                          effect for at least                           



                          three months. ?Stages                           



                          1 to 5, with or                           



                          without kidney                           



                          disease, indicate                           



                          chronic kidney                           



                          disease. Notes:                           



                          Determination of                           



                          stages one and two                           



                          (with eGFR                             



                          >59mL/min/1.73 m2)                           



                          requires estimation of                           



                          kidney damage for at                           



                          least three months as                           



                          defined by structural                           



                          or functional                           



                          abnormalities of the                           



                          kidney, manifested by                           



                          either:Pathological                           



                          abnormalities or                           



                          Markers of kidney                           



                          damage (including                           



                          abnormalities in the                           



                          composition of the                           



                          blood or urine or                           



                          abnormalities in                           



                          imaging tests).                           

 

             Lab Interpretation Abnormal                               



             (test code = 95848-7)                                        



[facility]NA, K, CL, CO2, 
GLUCOSE, BUN, CREATININE, CA)2022 15:51:39





             Test Item    Value        Reference Range Interpretation Comments

 

             NA (test code = 136 mmol/L   135-145                   



             8136102899)                                         

 

             K (test code = 3.7 mmol/L   3.5-5                     



             4185672324)                                         

 

             CL (test code = 111 mmol/L          H            



             3189513497)                                         

 

             CO2 TOTAL (test code = 21 mmol/L    23-31        L            



             1328335174)                                         

 

             AGAP (test code =              2-16                      



             0038463748)                                         

 

             BUN (test code = 9 mg/dL      7-23                      



             8194821746)                                         

 

             GLUCOSE (test code = 278 mg/dL           H            



             3404906813)                                         

 

             CREATININE (test code = 0.46 mg/dL   0.6-1.25     L            



             6263964000)                                         

 

             CALCIUM (test code = 8.2 mg/dL    8.6-10.6     L            



             9774602198)                                         

 

             eGFR (test code =              mL/min/1.73m2              



             2623233839)                                         

 

             MAL (test code = MAL) Association of                           



                          Glomerular Filtration                           



                          Rate (GFR) and Staging                           



                          of Kidney Disease*                           



                          +---------------------                           



                          --+-------------------                           



                          --+-------------------                           



                          ------+| GFR                           



                          (mL/min/1.73 m2) ?|                           



                          With Kidney Damage ?|                           



                          ?Without Kidney                           



                          Damage+---------------                           



                          --------+-------------                           



                          --------+-------------                           



                          ------------+| ?>90 ?                           



                          ? ? ? ? ? ? ? ?|                           



                          ?Stage one ? ? ? ? ?|                           



                          ? Normal ? ? ? ? ? ? ?                           



                          ?+--------------------                           



                          ---+------------------                           



                          ---+------------------                           



                          -------+| ?60-89 ? ? ?                           



                          ? ? ? ? ?| ?Stage two                           



                          ? ? ? ? ?| ? Decreased                           



                          GFR ? ? ? ?                            



                          +---------------------                           



                          --+-------------------                           



                          --+-------------------                           



                          ------+| ?30-59 ? ? ?                           



                          ? ? ? ? ?| ?Stage                           



                          three ? ? ? ?| ? Stage                           



                          three ? ? ? ? ?                           



                          +---------------------                           



                          --+-------------------                           



                          --+-------------------                           



                          ------+| ?15-29 ? ? ?                           



                          ? ? ? ? ?| ?Stage four                           



                          ? ? ? ? | ? Stage four                           



                          ? ? ? ? ?                              



                          ?+--------------------                           



                          ---+------------------                           



                          ---+------------------                           



                          -------+| ?<15 (or                           



                          dialysis) ? ?| ?Stage                           



                          five ? ? ? ? | ? Stage                           



                          five ? ? ? ? ?                           



                          ?+--------------------                           



                          ---+------------------                           



                          ---+------------------                           



                          -------+ *Each stage                           



                          assumes the associated                           



                          GFR level has been in                           



                          effect for at least                           



                          three months. ?Stages                           



                          1 to 5, with or                           



                          without kidney                           



                          disease, indicate                           



                          chronic kidney                           



                          disease. Notes:                           



                          Determination of                           



                          stages one and two                           



                          (with eGFR                             



                          >59mL/min/1.73 m2)                           



                          requires estimation of                           



                          kidney damage for at                           



                          least three months as                           



                          defined by structural                           



                          or functional                           



                          abnormalities of the                           



                          kidney, manifested by                           



                          either:Pathological                           



                          abnormalities or                           



                          Markers of kidney                           



                          damage (including                           



                          abnormalities in the                           



                          composition of the                           



                          blood or urine or                           



                          abnormalities in                           



                          imaging tests).                           

 

             Lab Interpretation Abnormal                               



             (test code = 07073-2)                                        



[facility] GLUCOSE (AUTOMATED)2022 12:36:29





             Test Item    Value        Reference Range Interpretation Comments

 

             POCT GLU (test code = 0072910693) 276 mg/dL           H      

      

 

             Lab Interpretation (test code = Abnormal                           

    



             75373-3)                                            



[facility] GLUCOSE (AUTOMATED)2022 12:36:29





             Test Item    Value        Reference Range Interpretation Comments

 

             POCT GLU (test code = 1887390641) 276 mg/dL           H      

      

 

             Lab Interpretation (test code = Abnormal                           

    



             85739-5)                                            



[facility] GLUCOSE (AUTOMATED)2022 01:29:51





             Test Item    Value        Reference Range Interpretation Comments

 

             POCT GLU (test code = 1624883968) 289 mg/dL           H      

      

 

             Lab Interpretation (test code = Abnormal                           

    



             58157-8)                                            



[facility] GLUCOSE (AUTOMATED)2022 01:29:51





             Test Item    Value        Reference Range Interpretation Comments

 

             POCT GLU (test code = 2637874652) 289 mg/dL           H      

      

 

             Lab Interpretation (test code = Abnormal                           

    



             65225-2)                                            



[facility] GLUCOSE (AUTOMATED)2022 21:24:38





             Test Item    Value        Reference Range Interpretation Comments

 

             POCT GLU (test code = 1151738863) 173 mg/dL           H      

      

 

             Lab Interpretation (test code = Abnormal                           

    



             34314-7)                                            



[facility] GLUCOSE (AUTOMATED)2022 21:24:38





             Test Item    Value        Reference Range Interpretation Comments

 

             POCT GLU (test code = 0568033596) 173 mg/dL           H      

      

 

             Lab Interpretation (test code = Abnormal                           

    



             64838-2)                                            



[facility] GLUCOSE (AUTOMATED)2022 16:50:49





             Test Item    Value        Reference Range Interpretation Comments

 

             POCT GLU (test code = 4197961186) 279 mg/dL           H      

      

 

             Lab Interpretation (test code = Abnormal                           

    



             05687-6)                                            



[facility] GLUCOSE (AUTOMATED)2022 16:50:49





             Test Item    Value        Reference Range Interpretation Comments

 

             POCT GLU (test code = 6940192149) 279 mg/dL           H      

      

 

             Lab Interpretation (test code = Abnormal                           

    



             44696-8)                                            



[facility] GLUCOSE (AUTOMATED)2022 13:31:23





             Test Item    Value        Reference Range Interpretation Comments

 

             POCT GLU (test code = 2198072467) 281 mg/dL           H      

      

 

             Lab Interpretation (test code = Abnormal                           

    



             15746-6)                                            



[facility] GLUCOSE (AUTOMATED)2022 13:31:23





             Test Item    Value        Reference Range Interpretation Comments

 

             POCT GLU (test code = 2728941428) 281 mg/dL           H      

      

 

             Lab Interpretation (test code = Abnormal                           

    



             18546-5)                                            



[facility]





             Test Item    Value        Reference Range Interpretation Comments

 

             POCT GLU (test code = 9643628971) 339 mg/dL           H      

      

 

             Lab Interpretation (test code = Abnormal                           

    



             34766-6)                                            



[facility] 10:02:35





             Test Item    Value        Reference Range Interpretation Comments

 

             POCT GLU (test code = 9615768546) 339 mg/dL           H      

      

 

             Lab Interpretation (test code = Abnormal                           

    



             45885-6)                                            



[facility] 06:33:22





             Test Item    Value        Reference Range Interpretation Comments

 

             POCT GLU (test code = 9263745475) 295 mg/dL           H      

      

 

             Lab Interpretation (test code = Abnormal                           

    



             84476-9)                                            



[facility] GLUCOSE (AUTOMATED)2022 06:33:22





             Test Item    Value        Reference Range Interpretation Comments

 

             POCT GLU (test code = 1056631149) 295 mg/dL           H      

      

 

             Lab Interpretation (test code = Abnormal                           

    



             40695-2)                                            



[facility] GLUCOSE (AUTOMATED)2022 01:54:18





             Test Item    Value        Reference Range Interpretation Comments

 

             POCT GLU (test code = 5616260988) 258 mg/dL           H      

      

 

             Lab Interpretation (test code = Abnormal                           

    



             51728-3)                                            



[facility] GLUCOSE (AUTOMATED)2022 01:54:18





             Test Item    Value        Reference Range Interpretation Comments

 

             POCT GLU (test code = 9210230482) 258 mg/dL           H      

      

 

             Lab Interpretation (test code = Abnormal                           

    



             42872-8)                                            



[facility] GLUCOSE (AUTOMATED)2022 23:03:09





             Test Item    Value        Reference Range Interpretation Comments

 

             POCT GLU (test code = 5124333069) 286 mg/dL           H      

      

 

             Lab Interpretation (test code = Abnormal                           

    



             32358-6)                                            



[facility] 23:03:09





             Test Item    Value        Reference Range Interpretation Comments

 

             POCT GLU (test code = 9014482634) 286 mg/dL           H      

      

 

             Lab Interpretation (test code = Abnormal                           

    



             43487-5)                                            



[facility] GLUCOSE (AUTOMATED)2022 20:48:08





             Test Item    Value        Reference Range Interpretation Comments

 

             POCT GLU (test code = 6412394578) 249 mg/dL           H      

      

 

             Lab Interpretation (test code = Abnormal                           

    



             03324-7)                                            



[facility] GLUCOSE (AUTOMATED)2022 20:48:08





             Test Item    Value        Reference Range Interpretation Comments

 

             POCT GLU (test code = 0588664715) 249 mg/dL           H      

      

 

             Lab Interpretation (test code = Abnormal                           

    



             62684-6)                                            



[id number] 17:01:47





             Test Item    Value        Reference Range Interpretation Comments

 

             BOH (test code = 1.0 mmol/L                             



             4190150073)                                         

 

             MAL (test code = Normal Ranges: ? ?                           



             MAL)         Nonfasting ? ? ? ? ? Less                           



                          than 0.1 mmol/L ? ?                           



                          Overnight Fast ? ? ? Less                           



                          than 0.4 mmol/L ? ? Fasting                           



                          (1-2 weeks) ?6-8 mmol/L Test                          

 



                          developed and                           



                          characteristics determined                           



                          by [facility].                          

 



[id number] 17:01:47





             Test Item    Value        Reference Range Interpretation Comments

 

             BOH (test code = 1.0 mmol/L                             



             2159862082)                                         

 

             MAL (test code = Normal Ranges: ? ?                           



             MAL)         Nonfasting ? ? ? ? ? Less                           



                          than 0.1 mmol/L ? ?                           



                          Overnight Fast ? ? ? Less                           



                          than 0.4 mmol/L ? ? Fasting                           



                          (1-2 weeks) ?6-8 mmol/L Test                          

 



                          developed and                           



                          characteristics determined                           



                          by [facility].                          

 



[id number] 17:01:47





             Test Item    Value        Reference Range Interpretation Comments

 

             BOH (test code = 1.0 mmol/L                             



             4862904991)                                         

 

             MAL (test code = Normal Ranges: ? ?                           



             MAL)         Nonfasting ? ? ? ? ? Less                           



                          than 0.1 mmol/L ? ?                           



                          Overnight Fast ? ? ? Less                           



                          than 0.4 mmol/L ? ? Fasting                           



                          (1-2 weeks) ?6-8 mmol/L Test                          

 



                          developed and                           



                          characteristics determined                           



                          by [facility].                          

 



[facility] GLUCOSE (AUTOMATED)2022 16:42:59





             Test Item    Value        Reference Range Interpretation Comments

 

             POCT GLU (test code = 8684381286) 264 mg/dL           H      

      

 

             Lab Interpretation (test code = Abnormal                           

    



             76210-1)                                            



[facility] 16:42:59





             Test Item    Value        Reference Range Interpretation Comments

 

             POCT GLU (test code = 9845017926) 264 mg/dL           H      

      

 

             Lab Interpretation (test code = Abnormal                           

    



             24530-5)                                            



[facility] Metabolic Panel (Na, K, Cl, CO2, 
Glucose, BUN, Creatinine, Ca)2022 16:16:20





             Test Item    Value        Reference Range Interpretation Comments

 

             NA (test code = 137 mmol/L   135-145                   



             6110368893)                                         

 

             K (test code = 3.5 mmol/L   3.5-5                     



             0556738916)                                         

 

             CL (test code = 115 mmol/L          H            



             3109084893)                                         

 

             CO2 TOTAL (test code = 17 mmol/L    23-31        L            



             2992909783)                                         

 

             AGAP (test code =              2-16                      



             0467223463)                                         

 

             BUN (test code = 5 mg/dL      7-23         L            



             9216947607)                                         

 

             GLUCOSE (test code = 271 mg/dL           H            



             1026177904)                                         

 

             CREATININE (test code = 0.45 mg/dL   0.6-1.25     L            



             7002206813)                                         

 

             CALCIUM (test code = 8.5 mg/dL    8.6-10.6     L            



             4920490995)                                         

 

             eGFR (test code =              mL/min/1.73m2              



             0227323348)                                         

 

             MAL (test code = MAL) Association of                           



                          Glomerular Filtration                           



                          Rate (GFR) and Staging                           



                          of Kidney Disease*                           



                          +---------------------                           



                          --+-------------------                           



                          --+-------------------                           



                          ------+| GFR                           



                          (mL/min/1.73 m2) ?|                           



                          With Kidney Damage ?|                           



                          ?Without Kidney                           



                          Damage+---------------                           



                          --------+-------------                           



                          --------+-------------                           



                          ------------+| ?>90 ?                           



                          ? ? ? ? ? ? ? ?|                           



                          ?Stage one ? ? ? ? ?|                           



                          ? Normal ? ? ? ? ? ? ?                           



                          ?+--------------------                           



                          ---+------------------                           



                          ---+------------------                           



                          -------+| ?60-89 ? ? ?                           



                          ? ? ? ? ?| ?Stage two                           



                          ? ? ? ? ?| ? Decreased                           



                          GFR ? ? ? ?                            



                          +---------------------                           



                          --+-------------------                           



                          --+-------------------                           



                          ------+| ?30-59 ? ? ?                           



                          ? ? ? ? ?| ?Stage                           



                          three ? ? ? ?| ? Stage                           



                          three ? ? ? ? ?                           



                          +---------------------                           



                          --+-------------------                           



                          --+-------------------                           



                          ------+| ?15-29 ? ? ?                           



                          ? ? ? ? ?| ?Stage four                           



                          ? ? ? ? | ? Stage four                           



                          ? ? ? ? ?                              



                          ?+--------------------                           



                          ---+------------------                           



                          ---+------------------                           



                          -------+| ?<15 (or                           



                          dialysis) ? ?| ?Stage                           



                          five ? ? ? ? | ? Stage                           



                          five ? ? ? ? ?                           



                          ?+--------------------                           



                          ---+------------------                           



                          ---+------------------                           



                          -------+ *Each stage                           



                          assumes the associated                           



                          GFR level has been in                           



                          effect for at least                           



                          three months. ?Stages                           



                          1 to 5, with or                           



                          without kidney                           



                          disease, indicate                           



                          chronic kidney                           



                          disease. Notes:                           



                          Determination of                           



                          stages one and two                           



                          (with eGFR                             



                          >59mL/min/1.73 m2)                           



                          requires estimation of                           



                          kidney damage for at                           



                          least three months as                           



                          defined by structural                           



                          or functional                           



                          abnormalities of the                           



                          kidney, manifested by                           



                          either:Pathological                           



                          abnormalities or                           



                          Markers of kidney                           



                          damage (including                           



                          abnormalities in the                           



                          composition of the                           



                          blood or urine or                           



                          abnormalities in                           



                          imaging tests).                           

 

             Lab Interpretation Abnormal                               



             (test code = 62050-2)                                        



[facility] Metabolic Panel (Na, K, Cl, CO2, 
Glucose, BUN, Creatinine, Ca)2022 16:16:20





             Test Item    Value        Reference Range Interpretation Comments

 

             NA (test code = 137 mmol/L   135-145                   



             6649528159)                                         

 

             K (test code = 3.5 mmol/L   3.5-5                     



             0833892869)                                         

 

             CL (test code = 115 mmol/L          H            



             2336767501)                                         

 

             CO2 TOTAL (test code = 17 mmol/L    23-31        L            



             8446545758)                                         

 

             AGAP (test code =              2-16                      



             4230265314)                                         

 

             BUN (test code = 5 mg/dL      7-23         L            



             2921201076)                                         

 

             GLUCOSE (test code = 271 mg/dL           H            



             3568346137)                                         

 

             CREATININE (test code = 0.45 mg/dL   0.6-1.25     L            



             7554250929)                                         

 

             CALCIUM (test code = 8.5 mg/dL    8.6-10.6     L            



             9806670094)                                         

 

             eGFR (test code =              mL/min/1.73m2              



             2458756443)                                         

 

             MAL (test code = MAL) Association of                           



                          Glomerular Filtration                           



                          Rate (GFR) and Staging                           



                          of Kidney Disease*                           



                          +---------------------                           



                          --+-------------------                           



                          --+-------------------                           



                          ------+| GFR                           



                          (mL/min/1.73 m2) ?|                           



                          With Kidney Damage ?|                           



                          ?Without Kidney                           



                          Damage+---------------                           



                          --------+-------------                           



                          --------+-------------                           



                          ------------+| ?>90 ?                           



                          ? ? ? ? ? ? ? ?|                           



                          ?Stage one ? ? ? ? ?|                           



                          ? Normal ? ? ? ? ? ? ?                           



                          ?+--------------------                           



                          ---+------------------                           



                          ---+------------------                           



                          -------+| ?60-89 ? ? ?                           



                          ? ? ? ? ?| ?Stage two                           



                          ? ? ? ? ?| ? Decreased                           



                          GFR ? ? ? ?                            



                          +---------------------                           



                          --+-------------------                           



                          --+-------------------                           



                          ------+| ?30-59 ? ? ?                           



                          ? ? ? ? ?| ?Stage                           



                          three ? ? ? ?| ? Stage                           



                          three ? ? ? ? ?                           



                          +---------------------                           



                          --+-------------------                           



                          --+-------------------                           



                          ------+| ?15-29 ? ? ?                           



                          ? ? ? ? ?| ?Stage four                           



                          ? ? ? ? | ? Stage four                           



                          ? ? ? ? ?                              



                          ?+--------------------                           



                          ---+------------------                           



                          ---+------------------                           



                          -------+| ?<15 (or                           



                          dialysis) ? ?| ?Stage                           



                          five ? ? ? ? | ? Stage                           



                          five ? ? ? ? ?                           



                          ?+--------------------                           



                          ---+------------------                           



                          ---+------------------                           



                          -------+ *Each stage                           



                          assumes the associated                           



                          GFR level has been in                           



                          effect for at least                           



                          three months. ?Stages                           



                          1 to 5, with or                           



                          without kidney                           



                          disease, indicate                           



                          chronic kidney                           



                          disease. Notes:                           



                          Determination of                           



                          stages one and two                           



                          (with eGFR                             



                          >59mL/min/1.73 m2)                           



                          requires estimation of                           



                          kidney damage for at                           



                          least three months as                           



                          defined by structural                           



                          or functional                           



                          abnormalities of the                           



                          kidney, manifested by                           



                          either:Pathological                           



                          abnormalities or                           



                          Markers of kidney                           



                          damage (including                           



                          abnormalities in the                           



                          composition of the                           



                          blood or urine or                           



                          abnormalities in                           



                          imaging tests).                           

 

             Lab Interpretation Abnormal                               



             (test code = 27270-7)                                        



[facility] GLUCOSE (AUTOMATED)2022 14:21:27





             Test Item    Value        Reference Range Interpretation Comments

 

             POCT GLU (test code = 9614103338) 286 mg/dL           H      

      

 

             Lab Interpretation (test code = Abnormal                           

    



             13371-2)                                            



[facility] GLUCOSE (AUTOMATED)2022 14:21:27





             Test Item    Value        Reference Range Interpretation Comments

 

             POCT GLU (test code = 4734634843) 286 mg/dL           H      

      

 

             Lab Interpretation (test code = Abnormal                           

    



             62195-8)                                            



[facility] 
[id number] 13:29:25





             Test Item    Value        Reference Range Interpretation Comments

 

             Coagulase Negative Positive     Negative, See A            



             Staphylococcus (test              Comment/Narrative              



             code = 72429-3)                                        

 

             MAL (test code = MAL)  Coagulase negative                          

 



                          Staphylococcus (CoNS)                           



                          detected by DNA probe.                           



                          ?CoNS often                            



                          contaminate blood                           



                          cultures from skin                           



                          colonization during                           



                          phlebotomy. ?Preferred                           



                          management is to                           



                          repeat blood cultures,                           



                          and monitor off                           



                          antibiotics.                           



                          ?Contamination is                           



                          suggested by culture                           



                          growth after 48 hours,                           



                          or growth in single                           



                          culture (i.e., one of                           



                          two sets). ?True                           



                          bacteremia is                           



                          suggested by the                           



                          fever, hypotension,                           



                          and leukocytosis that                           



                          are not explained by                           



                          an alternative                           



                          infection, or                           



                          indwelling foreign                           



                          devices that appear                           



                          infected (catheters,                           



                          lines, or prostheses).                           



                          Consider Infectious                           



                          Diseases consultation                           



                          if differentiation of                           



                          CoNS bacteremia from                           



                          contamination is                           



                          uncertain. If clinical                           



                          context suggests true                           



                          bacteremia, preferred                           



                          therapy is vancomycin.                           



                          Please contact the                           



                          Antimicrobial                           



                          Stewardship Program                           



                          with questions.Pager:                           



                          [phone] Testing                           



                          included eleven                           



                          identification and                           



                          three resistance                           



                          marker                                 



                          targets.______________                           



                          ______________________                           



                          ______________________                           



                          _____________                           

 

             Lab Interpretation Abnormal                               



             (test code = 40904-2)                                        



[facility] POSITIVE BLOOD PATHOGENS DNA 
[id number] 13:29:25





             Test Item    Value        Reference Range Interpretation Comments

 

             Coagulase Negative Positive     Negative, See A            



             Staphylococcus (test              Comment/Narrative              



             code = 42232-6)                                        

 

             MAL (test code = MAL)  Coagulase negative                          

 



                          Staphylococcus (CoNS)                           



                          detected by DNA probe.                           



                          ?CoNS often                            



                          contaminate blood                           



                          cultures from skin                           



                          colonization during                           



                          phlebotomy. ?Preferred                           



                          management is to                           



                          repeat blood cultures,                           



                          and monitor off                           



                          antibiotics.                           



                          ?Contamination is                           



                          suggested by culture                           



                          growth after 48 hours,                           



                          or growth in single                           



                          culture (i.e., one of                           



                          two sets). ?True                           



                          bacteremia is                           



                          suggested by the                           



                          fever, hypotension,                           



                          and leukocytosis that                           



                          are not explained by                           



                          an alternative                           



                          infection, or                           



                          indwelling foreign                           



                          devices that appear                           



                          infected (catheters,                           



                          lines, or prostheses).                           



                          Consider Infectious                           



                          Diseases consultation                           



                          if differentiation of                           



                          CoNS bacteremia from                           



                          contamination is                           



                          uncertain. If clinical                           



                          context suggests true                           



                          bacteremia, preferred                           



                          therapy is vancomycin.                           



                          Please contact the                           



                          Antimicrobial                           



                          Stewardship Program                           



                          with questions.Pager:                           



                          [phone] Testing                           



                          included eleven                           



                          identification and                           



                          three resistance                           



                          marker                                 



                          targets.______________                           



                          ______________________                           



                          ______________________                           



                          _____________                           

 

             Lab Interpretation Abnormal                               



             (test code = 19049-5)                                        



[facility] POSITIVE BLOOD PATHOGENS DNA 
[id number] 13:29:25





             Test Item    Value        Reference Range Interpretation Comments

 

             Coagulase Negative Positive     Negative, See A            



             Staphylococcus (test              Comment/Narrative              



             code = 80963-5)                                        

 

             MAL (test code = MAL)  Coagulase negative                          

 



                          Staphylococcus (CoNS)                           



                          detected by DNA probe.                           



                          ?CoNS often                            



                          contaminate blood                           



                          cultures from skin                           



                          colonization during                           



                          phlebotomy. ?Preferred                           



                          management is to                           



                          repeat blood cultures,                           



                          and monitor off                           



                          antibiotics.                           



                          ?Contamination is                           



                          suggested by culture                           



                          growth after 48 hours,                           



                          or growth in single                           



                          culture (i.e., one of                           



                          two sets). ?True                           



                          bacteremia is                           



                          suggested by the                           



                          fever, hypotension,                           



                          and leukocytosis that                           



                          are not explained by                           



                          an alternative                           



                          infection, or                           



                          indwelling foreign                           



                          devices that appear                           



                          infected (catheters,                           



                          lines, or prostheses).                           



                          Consider Infectious                           



                          Diseases consultation                           



                          if differentiation of                           



                          CoNS bacteremia from                           



                          contamination is                           



                          uncertain. If clinical                           



                          context suggests true                           



                          bacteremia, preferred                           



                          therapy is vancomycin.                           



                          Please contact the                           



                          Antimicrobial                           



                          Stewardship Program                           



                          with questions.Pager:                           



                          [phone] Testing                           



                          included eleven                           



                          identification and                           



                          three resistance                           



                          marker                                 



                          targets.______________                           



                          ______________________                           



                          ______________________                           



                          _____________                           

 

             Lab Interpretation Abnormal                               



             (test code = 76287-1)                                        



[facility] GLUCOSE (AUTOMATED)2022 13:03:22





             Test Item    Value        Reference Range Interpretation Comments

 

             POCT GLU (test code = 4221347541) 307 mg/dL           H      

      

 

             Lab Interpretation (test code = Abnormal                           

    



             74988-3)                                            



[facility] GLUCOSE (AUTOMATED)2022 13:03:22





             Test Item    Value        Reference Range Interpretation Comments

 

             POCT GLU (test code = 9572361633) 307 mg/dL           H      

      

 

             Lab Interpretation (test code = Abnormal                           

    



             68599-7)                                            



[facility] GLUCOSE (AUTOMATED)2022 09:05:19





             Test Item    Value        Reference Range Interpretation Comments

 

             POCT GLU (test code = 9553153246) 326 mg/dL           H      

      

 

             Lab Interpretation (test code = Abnormal                           

    



             40978-1)                                            



[facility] GLUCOSE (AUTOMATED)2022 09:05:19





             Test Item    Value        Reference Range Interpretation Comments

 

             POCT GLU (test code = 5321575289) 326 mg/dL           H      

      

 

             Lab Interpretation (test code = Abnormal                           

    



             07335-9)                                            



[facility] GLUCOSE (AUTOMATED)2022 05:46:58





             Test Item    Value        Reference Range Interpretation Comments

 

             POCT GLU (test code = 7524505230) 341 mg/dL           H      

      

 

             Lab Interpretation (test code = Abnormal                           

    



             21057-9)                                            



[facility] GLUCOSE (AUTOMATED)2022 05:46:58





             Test Item    Value        Reference Range Interpretation Comments

 

             POCT GLU (test code = 8679798142) 341 mg/dL           H      

      

 

             Lab Interpretation (test code = Abnormal                           

    



             03444-2)                                            



[facility] GLUCOSE (AUTOMATED)2022 01:37:20





             Test Item    Value        Reference Range Interpretation Comments

 

             POCT GLU (test code = 9064109903) 196 mg/dL           H      

      

 

             Lab Interpretation (test code = Abnormal                           

    



             19466-0)                                            



[facility] GLUCOSE (AUTOMATED)2022 01:37:20





             Test Item    Value        Reference Range Interpretation Comments

 

             POCT GLU (test code = 2883758023) 196 mg/dL           H      

      

 

             Lab Interpretation (test code = Abnormal                           

    



             54710-9)                                            



[facility] GLUCOSE (AUTOMATED)2022 23:44:41





             Test Item    Value        Reference Range Interpretation Comments

 

             POCT GLU (test code = 9660710252) 138 mg/dL           H      

      

 

             Lab Interpretation (test code = Abnormal                           

    



             55580-8)                                            



[facility] GLUCOSE (AUTOMATED)2022 23:44:41





             Test Item    Value        Reference Range Interpretation Comments

 

             POCT GLU (test code = 1604587656) 138 mg/dL           H      

      

 

             Lab Interpretation (test code = Abnormal                           

    



             53655-3)                                            



[facility] Metabolic Panel (Na, K, Cl, CO2, 
Glucose, BUN, Creatinine, Ca)2022 23:12:45





             Test Item    Value        Reference Range Interpretation Comments

 

             NA (test code = 138 mmol/L   135-145                   



             0194673179)                                         

 

             K (test code = 3.7 mmol/L   3.5-5                     



             6737101997)                                         

 

             CL (test code = 115 mmol/L          H            



             8702666308)                                         

 

             CO2 TOTAL (test code = 17 mmol/L    23-31        L            



             1943146753)                                         

 

             AGAP (test code =              2-16                      



             7541601160)                                         

 

             BUN (test code = 5 mg/dL      7-23         L            



             8271264828)                                         

 

             GLUCOSE (test code = 86 mg/dL                         



             6082880240)                                         

 

             CREATININE (test code = 0.46 mg/dL   0.6-1.25     L            



             7787828335)                                         

 

             CALCIUM (test code = 8.4 mg/dL    8.6-10.6     L            



             0186947883)                                         

 

             eGFR (test code =              mL/min/1.73m2              



             1395532974)                                         

 

             MAL (test code = MAL) Association of                           



                          Glomerular Filtration                           



                          Rate (GFR) and Staging                           



                          of Kidney Disease*                           



                          +---------------------                           



                          --+-------------------                           



                          --+-------------------                           



                          ------+| GFR                           



                          (mL/min/1.73 m2) ?|                           



                          With Kidney Damage ?|                           



                          ?Without Kidney                           



                          Damage+---------------                           



                          --------+-------------                           



                          --------+-------------                           



                          ------------+| ?>90 ?                           



                          ? ? ? ? ? ? ? ?|                           



                          ?Stage one ? ? ? ? ?|                           



                          ? Normal ? ? ? ? ? ? ?                           



                          ?+--------------------                           



                          ---+------------------                           



                          ---+------------------                           



                          -------+| ?60-89 ? ? ?                           



                          ? ? ? ? ?| ?Stage two                           



                          ? ? ? ? ?| ? Decreased                           



                          GFR ? ? ? ?                            



                          +---------------------                           



                          --+-------------------                           



                          --+-------------------                           



                          ------+| ?30-59 ? ? ?                           



                          ? ? ? ? ?| ?Stage                           



                          three ? ? ? ?| ? Stage                           



                          three ? ? ? ? ?                           



                          +---------------------                           



                          --+-------------------                           



                          --+-------------------                           



                          ------+| ?15-29 ? ? ?                           



                          ? ? ? ? ?| ?Stage four                           



                          ? ? ? ? | ? Stage four                           



                          ? ? ? ? ?                              



                          ?+--------------------                           



                          ---+------------------                           



                          ---+------------------                           



                          -------+| ?<15 (or                           



                          dialysis) ? ?| ?Stage                           



                          five ? ? ? ? | ? Stage                           



                          five ? ? ? ? ?                           



                          ?+--------------------                           



                          ---+------------------                           



                          ---+------------------                           



                          -------+ *Each stage                           



                          assumes the associated                           



                          GFR level has been in                           



                          effect for at least                           



                          three months. ?Stages                           



                          1 to 5, with or                           



                          without kidney                           



                          disease, indicate                           



                          chronic kidney                           



                          disease. Notes:                           



                          Determination of                           



                          stages one and two                           



                          (with eGFR                             



                          >59mL/min/1.73 m2)                           



                          requires estimation of                           



                          kidney damage for at                           



                          least three months as                           



                          defined by structural                           



                          or functional                           



                          abnormalities of the                           



                          kidney, manifested by                           



                          either:Pathological                           



                          abnormalities or                           



                          Markers of kidney                           



                          damage (including                           



                          abnormalities in the                           



                          composition of the                           



                          blood or urine or                           



                          abnormalities in                           



                          imaging tests).                           

 

             Lab Interpretation Abnormal                               



             (test code = 64472-8)                                        



[facility] Metabolic Panel (Na, K, Cl, CO2, 
Glucose, BUN, Creatinine, Ca)2022 23:12:45





             Test Item    Value        Reference Range Interpretation Comments

 

             NA (test code = 138 mmol/L   135-145                   



             [id number])                                         

 

             K (test code = 3.7 mmol/L   3.5-5                     



             6335609682)                                         

 

             CL (test code = 115 mmol/L          H            



             1590740844)                                         

 

             CO2 TOTAL (test code = 17 mmol/L    23-31        L            



             2293957176)                                         

 

             AGAP (test code =              2-16                      



             6120669387)                                         

 

             BUN (test code = 5 mg/dL      7-23         L            



             9987775641)                                         

 

             GLUCOSE (test code = 86 mg/dL                         



             0452211627)                                         

 

             CREATININE (test code = 0.46 mg/dL   0.6-1.25     L            



             0647104468)                                         

 

             CALCIUM (test code = 8.4 mg/dL    8.6-10.6     L            



             8995103114)                                         

 

             eGFR (test code =              mL/min/1.73m2              



             4453444637)                                         

 

             MAL (test code = MAL) Association of                           



                          Glomerular Filtration                           



                          Rate (GFR) and Staging                           



                          of Kidney Disease*                           



                          +---------------------                           



                          --+-------------------                           



                          --+-------------------                           



                          ------+| GFR                           



                          (mL/min/1.73 m2) ?|                           



                          With Kidney Damage ?|                           



                          ?Without Kidney                           



                          Damage+---------------                           



                          --------+-------------                           



                          --------+-------------                           



                          ------------+| ?>90 ?                           



                          ? ? ? ? ? ? ? ?|                           



                          ?Stage one ? ? ? ? ?|                           



                          ? Normal ? ? ? ? ? ? ?                           



                          ?+--------------------                           



                          ---+------------------                           



                          ---+------------------                           



                          -------+| ?60-89 ? ? ?                           



                          ? ? ? ? ?| ?Stage two                           



                          ? ? ? ? ?| ? Decreased                           



                          GFR ? ? ? ?                            



                          +---------------------                           



                          --+-------------------                           



                          --+-------------------                           



                          ------+| ?30-59 ? ? ?                           



                          ? ? ? ? ?| ?Stage                           



                          three ? ? ? ?| ? Stage                           



                          three ? ? ? ? ?                           



                          +---------------------                           



                          --+-------------------                           



                          --+-------------------                           



                          ------+| ?15-29 ? ? ?                           



                          ? ? ? ? ?| ?Stage four                           



                          ? ? ? ? | ? Stage four                           



                          ? ? ? ? ?                              



                          ?+--------------------                           



                          ---+------------------                           



                          ---+------------------                           



                          -------+| ?<15 (or                           



                          dialysis) ? ?| ?Stage                           



                          five ? ? ? ? | ? Stage                           



                          five ? ? ? ? ?                           



                          ?+--------------------                           



                          ---+------------------                           



                          ---+------------------                           



                          -------+ *Each stage                           



                          assumes the associated                           



                          GFR level has been in                           



                          effect for at least                           



                          three months. ?Stages                           



                          1 to 5, with or                           



                          without kidney                           



                          disease, indicate                           



                          chronic kidney                           



                          disease. Notes:                           



                          Determination of                           



                          stages one and two                           



                          (with eGFR                             



                          >59mL/min/1.73 m2)                           



                          requires estimation of                           



                          kidney damage for at                           



                          least three months as                           



                          defined by structural                           



                          or functional                           



                          abnormalities of the                           



                          kidney, manifested by                           



                          either:Pathological                           



                          abnormalities or                           



                          Markers of kidney                           



                          damage (including                           



                          abnormalities in the                           



                          composition of the                           



                          blood or urine or                           



                          abnormalities in                           



                          imaging tests).                           

 

             Lab Interpretation Abnormal                               



             (test code = 97917-0)                                        



[facility] GLUCOSE (AUTOMATED)2022 22:38:52





             Test Item    Value        Reference Range Interpretation Comments

 

             POCT GLU (test code = 5869285619) 94 mg/dL                   

      

 

             Lab Interpretation (test code = Normal                             

    



             79103-7)                                            



[facility] GLUCOSE (AUTOMATED)2022 22:38:52





             Test Item    Value        Reference Range Interpretation Comments

 

             POCT GLU (test code = 6704902966) 94 mg/dL                   

      

 

             Lab Interpretation (test code = Normal                             

    



             20385-7)                                            



[facility] GLUCOSE (AUTOMATED)2022 21:31:35





             Test Item    Value        Reference Range Interpretation Comments

 

             POCT GLU (test code = 8622376701) 119 mg/dL           H      

      

 

             Lab Interpretation (test code = Abnormal                           

    



             01662-5)                                            



[facility] GLUCOSE (AUTOMATED)2022 21:31:35





             Test Item    Value        Reference Range Interpretation Comments

 

             POCT GLU (test code = 5097752278) 119 mg/dL           H      

      

 

             Lab Interpretation (test code = Abnormal                           

    



             61088-0)                                            



[facility] GLUCOSE (AUTOMATED)2022 20:06:03





             Test Item    Value        Reference Range Interpretation Comments

 

             POCT GLU (test code = 1309200135) 177 mg/dL           H      

      

 

             Lab Interpretation (test code = Abnormal                           

    



             56748-3)                                            



[facility] GLUCOSE (AUTOMATED)2022 20:06:03





             Test Item    Value        Reference Range Interpretation Comments

 

             POCT GLU (test code = 6920621213) 177 mg/dL           H      

      

 

             Lab Interpretation (test code = Abnormal                           

    



             51162-5)                                            



[facility] GLUCOSE (AUTOMATED)2022 19:07:20





             Test Item    Value        Reference Range Interpretation Comments

 

             POCT GLU (test code = 3786087938) 185 mg/dL           H      

      

 

             Lab Interpretation (test code = Abnormal                           

    



             36754-6)                                            



[facility] GLUCOSE (AUTOMATED)2022 19:07:20





             Test Item    Value        Reference Range Interpretation Comments

 

             POCT GLU (test code = 7976295115) 185 mg/dL           H      

      

 

             Lab Interpretation (test code = Abnormal                           

    



             86355-4)                                            



[facility]





             Test Item    Value        Reference    Interpretation Comments



                                       Range                     

 

             TROPONIN I (test 0.000 ng/mL  See_Comment                [Automated



             code = 6271226276)                                        message] 

The



                                                                 system which



                                                                 generated this



                                                                 result



                                                                 transmitted



                                                                 reference range

:



                                                                 <=0.034. The



                                                                 reference range



                                                                 was not used to



                                                                 interpret this



                                                                 result as



                                                                 normal/abnormal

.

 

             MAL (test code = Reference (Normal)                           



             MAL)         Range (defined by                           



                          the 99th percentile                           



                          reference limit): <=                           



                          0.034 ng/mL Note:                           



                          Cardiac troponin                           



                          begins to rise 3-4                           



                          hours after the                           



                          onset of ischemia.                           



                          Repeat in 4-6 hours                           



                          if the sample was                           



                          drawn within 3-4                           



                          hours of the onset                           



                          of the symptom and                           



                          found normal.                           



                          Diagnosis of                           



                          myocardial injury is                           



                          made with acute                           



                          changes in cTn                           



                          concentrations with                           



                          at least one serial                           



                          sample above the                           



                          99th percentile                           



                          upper reference                           



                          limit (URL), taken                           



                          together with the                           



                          patient's clinical                           



                          presentation. Biotin                           



                          has been reported to                           



                          cause a negative                           



                          bias, interpret                           



                          results relative to                           



                          patient's use of                           



                          biotin.                                

 

             Lab Interpretation Normal                                 



             (test code =                                        



             74914-0)                                            



[facility] -20-36 18:44:53





             Test Item    Value        Reference    Interpretation Comments



                                       Range                     

 

             TROPONIN I (test 0.000 ng/mL  See_Comment                [Automated



             code = 0777730390)                                        message] 

The



                                                                 system which



                                                                 generated this



                                                                 result



                                                                 transmitted



                                                                 reference range

:



                                                                 <=0.034. The



                                                                 reference range



                                                                 was not used to



                                                                 interpret this



                                                                 result as



                                                                 normal/abnormal

.

 

             MAL (test code = Reference (Normal)                           



             MAL)         Range (defined by                           



                          the 99th percentile                           



                          reference limit): <=                           



                          0.034 ng/mL Note:                           



                          Cardiac troponin                           



                          begins to rise 3-4                           



                          hours after the                           



                          onset of ischemia.                           



                          Repeat in 4-6 hours                           



                          if the sample was                           



                          drawn within 3-4                           



                          hours of the onset                           



                          of the symptom and                           



                          found normal.                           



                          Diagnosis of                           



                          myocardial injury is                           



                          made with acute                           



                          changes in cTn                           



                          concentrations with                           



                          at least one serial                           



                          sample above the                           



                          99th percentile                           



                          upper reference                           



                          limit (URL), taken                           



                          together with the                           



                          patient's clinical                           



                          presentation. Biotin                           



                          has been reported to                           



                          cause a negative                           



                          bias, interpret                           



                          results relative to                           



                          patient's use of                           



                          biotin.                                

 

             Lab Interpretation Normal                                 



             (test code =                                        



             44447-1)                                            



[facility] -04-88 18:44:53





             Test Item    Value        Reference    Interpretation Comments



                                       Range                     

 

             TROPONIN I (test 0.000 ng/mL  See_Comment                [Automated



             code = 5355420422)                                        message] 

The



                                                                 system which



                                                                 generated this



                                                                 result



                                                                 transmitted



                                                                 reference range

:



                                                                 <=0.034. The



                                                                 reference range



                                                                 was not used to



                                                                 interpret this



                                                                 result as



                                                                 normal/abnormal

.

 

             MAL (test code = Reference (Normal)                           



             MAL)         Range (defined by                           



                          the 99th percentile                           



                          reference limit): <=                           



                          0.034 ng/mL Note:                           



                          Cardiac troponin                           



                          begins to rise 3-4                           



                          hours after the                           



                          onset of ischemia.                           



                          Repeat in 4-6 hours                           



                          if the sample was                           



                          drawn within 3-4                           



                          hours of the onset                           



                          of the symptom and                           



                          found normal.                           



                          Diagnosis of                           



                          myocardial injury is                           



                          made with acute                           



                          changes in cTn                           



                          concentrations with                           



                          at least one serial                           



                          sample above the                           



                          99th percentile                           



                          upper reference                           



                          limit (URL), taken                           



                          together with the                           



                          patient's clinical                           



                          presentation. Biotin                           



                          has been reported to                           



                          cause a negative                           



                          bias, interpret                           



                          results relative to                           



                          patient's use of                           



                          biotin.                                

 

             Lab Interpretation Normal                                 



             (test code =                                        



             97331-2)                                            



[facility] Metabolic Panel (Na, K, Cl, CO2, 
Glucose, BUN, Creatinine, Ca)2022 18:27:49





             Test Item    Value        Reference Range Interpretation Comments

 

             NA (test code = 138 mmol/L   135-145                   



             0870646721)                                         

 

             K (test code = 3.7 mmol/L   3.5-5                     



             6383716820)                                         

 

             CL (test code = 115 mmol/L          H            



             2173725166)                                         

 

             CO2 TOTAL (test code = 16 mmol/L    23-31        L            



             1798044076)                                         

 

             AGAP (test code =              2-16                      



             [id number])                                         

 

             BUN (test code = 7 mg/dL      7-23                      



             7804465928)                                         

 

             GLUCOSE (test code = 195 mg/dL           H            



             [id number])                                         

 

             CREATININE (test code = 0.52 mg/dL   0.6-1.25     L            



             1253981723)                                         

 

             CALCIUM (test code = 8.2 mg/dL    8.6-10.6     L            



             6861840819)                                         

 

             eGFR (test code =              mL/min/1.73m2              



             2631551448)                                         

 

             MAL (test code = MAL) Association of                           



                          Glomerular Filtration                           



                          Rate (GFR) and Staging                           



                          of Kidney Disease*                           



                          +---------------------                           



                          --+-------------------                           



                          --+-------------------                           



                          ------+| GFR                           



                          (mL/min/1.73 m2) ?|                           



                          With Kidney Damage ?|                           



                          ?Without Kidney                           



                          Damage+---------------                           



                          --------+-------------                           



                          --------+-------------                           



                          ------------+| ?>90 ?                           



                          ? ? ? ? ? ? ? ?|                           



                          ?Stage one ? ? ? ? ?|                           



                          ? Normal ? ? ? ? ? ? ?                           



                          ?+--------------------                           



                          ---+------------------                           



                          ---+------------------                           



                          -------+| ?60-89 ? ? ?                           



                          ? ? ? ? ?| ?Stage two                           



                          ? ? ? ? ?| ? Decreased                           



                          GFR ? ? ? ?                            



                          +---------------------                           



                          --+-------------------                           



                          --+-------------------                           



                          ------+| ?30-59 ? ? ?                           



                          ? ? ? ? ?| ?Stage                           



                          three ? ? ? ?| ? Stage                           



                          three ? ? ? ? ?                           



                          +---------------------                           



                          --+-------------------                           



                          --+-------------------                           



                          ------+| ?15-29 ? ? ?                           



                          ? ? ? ? ?| ?Stage four                           



                          ? ? ? ? | ? Stage four                           



                          ? ? ? ? ?                              



                          ?+--------------------                           



                          ---+------------------                           



                          ---+------------------                           



                          -------+| ?<15 (or                           



                          dialysis) ? ?| ?Stage                           



                          five ? ? ? ? | ? Stage                           



                          five ? ? ? ? ?                           



                          ?+--------------------                           



                          ---+------------------                           



                          ---+------------------                           



                          -------+ *Each stage                           



                          assumes the associated                           



                          GFR level has been in                           



                          effect for at least                           



                          three months. ?Stages                           



                          1 to 5, with or                           



                          without kidney                           



                          disease, indicate                           



                          chronic kidney                           



                          disease. Notes:                           



                          Determination of                           



                          stages one and two                           



                          (with eGFR                             



                          >59mL/min/1.73 m2)                           



                          requires estimation of                           



                          kidney damage for at                           



                          least three months as                           



                          defined by structural                           



                          or functional                           



                          abnormalities of the                           



                          kidney, manifested by                           



                          either:Pathological                           



                          abnormalities or                           



                          Markers of kidney                           



                          damage (including                           



                          abnormalities in the                           



                          composition of the                           



                          blood or urine or                           



                          abnormalities in                           



                          imaging tests).                           

 

             Lab Interpretation Abnormal                               



             (test code = 85062-7)                                        



[facility] Metabolic Panel (Na, K, Cl, CO2, 
Glucose, BUN, Creatinine, Ca)2022 18:27:49





             Test Item    Value        Reference Range Interpretation Comments

 

             NA (test code = 138 mmol/L   135-145                   



             [id number])                                         

 

             K (test code = 3.7 mmol/L   3.5-5                     



             1151819254)                                         

 

             CL (test code = 115 mmol/L          H            



             4172360298)                                         

 

             CO2 TOTAL (test code = 16 mmol/L    23-31        L            



             1767135718)                                         

 

             AGAP (test code =              2-16                      



             9301785380)                                         

 

             BUN (test code = 7 mg/dL      7-23                      



             6403076820)                                         

 

             GLUCOSE (test code = 195 mg/dL           H            



             4850484815)                                         

 

             CREATININE (test code = 0.52 mg/dL   0.6-1.25     L            



             1401655540)                                         

 

             CALCIUM (test code = 8.2 mg/dL    8.6-10.6     L            



             5553597042)                                         

 

             eGFR (test code =              mL/min/1.73m2              



             8095770861)                                         

 

             MAL (test code = MAL) Association of                           



                          Glomerular Filtration                           



                          Rate (GFR) and Staging                           



                          of Kidney Disease*                           



                          +---------------------                           



                          --+-------------------                           



                          --+-------------------                           



                          ------+| GFR                           



                          (mL/min/1.73 m2) ?|                           



                          With Kidney Damage ?|                           



                          ?Without Kidney                           



                          Damage+---------------                           



                          --------+-------------                           



                          --------+-------------                           



                          ------------+| ?>90 ?                           



                          ? ? ? ? ? ? ? ?|                           



                          ?Stage one ? ? ? ? ?|                           



                          ? Normal ? ? ? ? ? ? ?                           



                          ?+--------------------                           



                          ---+------------------                           



                          ---+------------------                           



                          -------+| ?60-89 ? ? ?                           



                          ? ? ? ? ?| ?Stage two                           



                          ? ? ? ? ?| ? Decreased                           



                          GFR ? ? ? ?                            



                          +---------------------                           



                          --+-------------------                           



                          --+-------------------                           



                          ------+| ?30-59 ? ? ?                           



                          ? ? ? ? ?| ?Stage                           



                          three ? ? ? ?| ? Stage                           



                          three ? ? ? ? ?                           



                          +---------------------                           



                          --+-------------------                           



                          --+-------------------                           



                          ------+| ?15-29 ? ? ?                           



                          ? ? ? ? ?| ?Stage four                           



                          ? ? ? ? | ? Stage four                           



                          ? ? ? ? ?                              



                          ?+--------------------                           



                          ---+------------------                           



                          ---+------------------                           



                          -------+| ?<15 (or                           



                          dialysis) ? ?| ?Stage                           



                          five ? ? ? ? | ? Stage                           



                          five ? ? ? ? ?                           



                          ?+--------------------                           



                          ---+------------------                           



                          ---+------------------                           



                          -------+ *Each stage                           



                          assumes the associated                           



                          GFR level has been in                           



                          effect for at least                           



                          three months. ?Stages                           



                          1 to 5, with or                           



                          without kidney                           



                          disease, indicate                           



                          chronic kidney                           



                          disease. Notes:                           



                          Determination of                           



                          stages one and two                           



                          (with eGFR                             



                          >59mL/min/1.73 m2)                           



                          requires estimation of                           



                          kidney damage for at                           



                          least three months as                           



                          defined by structural                           



                          or functional                           



                          abnormalities of the                           



                          kidney, manifested by                           



                          either:Pathological                           



                          abnormalities or                           



                          Markers of kidney                           



                          damage (including                           



                          abnormalities in the                           



                          composition of the                           



                          blood or urine or                           



                          abnormalities in                           



                          imaging tests).                           

 

             Lab Interpretation Abnormal                               



             (test code = 86169-8)                                        



[id number]





             Test Item    Value        Reference Range Interpretation Comments

 

             BOH (test code = 3.3 mmol/L                             



             8535008830)                                         

 

             MAL (test code = Normal Ranges: ? ?                           



             MAL)         Nonfasting ? ? ? ? ? Less                           



                          than 0.1 mmol/L ? ?                           



                          Overnight Fast ? ? ? Less                           



                          than 0.4 mmol/L ? ? Fasting                           



                          (1-2 weeks) ?6-8 mmol/L Test                          

 



                          developed and                           



                          characteristics determined                           



                          by [facility].                          

 



[id number] 17:43:01





             Test Item    Value        Reference Range Interpretation Comments

 

             BOH (test code = 3.3 mmol/L                             



             0731851194)                                         

 

             MAL (test code = Normal Ranges: ? ?                           



             MAL)         Nonfasting ? ? ? ? ? Less                           



                          than 0.1 mmol/L ? ?                           



                          Overnight Fast ? ? ? Less                           



                          than 0.4 mmol/L ? ? Fasting                           



                          (1-2 weeks) ?6-8 mmol/L Test                          

 



                          developed and                           



                          characteristics determined                           



                          by [facility].                          

 



[facility] 17[facility]29:05





             Test Item    Value        Reference Range Interpretation Comments

 

             POCT GLU (test code = 2607333878) 192 mg/dL           H      

      

 

             Lab Interpretation (test code = Abnormal                           

    



             17423-6)                                            



[facility] 17:29:05





             Test Item    Value        Reference Range Interpretation Comments

 

             POCT GLU (test code = 9420443527) 192 mg/dL           H      

      

 

             Lab Interpretation (test code = Abnormal                           

    



             68477-9)                                            



[facility] [id number] 16:28:42





             Test Item    Value        Reference Range Interpretation Comments

 

             OSMOLALITY (test code =              See_Comment  H             [Au

tomated message]



             2692-2)                                             The system [employer]



                                                                 generated this 

result



                                                                 transmitted ref

erence



                                                                 range: 278 - 30

5



                                                                 mOsm/kg. The



                                                                 reference range

 was



                                                                 not used to int

erpret



                                                                 this result as



                                                                 normal/abnormal

.

 

             Lab Interpretation (test Abnormal                               



             code = 63270-0)                                        



[facility] [id number] 16:28:42





             Test Item    Value        Reference Range Interpretation Comments

 

             OSMOLALITY (test code =              See_Comment  H             [Au

tomated message]



             2692-2)                                             The system [employer]



                                                                 generated this 

result



                                                                 transmitted ref

erence



                                                                 range: 278 - 30

5



                                                                 mOsm/kg. The



                                                                 reference range

 was



                                                                 not used to int

erpret



                                                                 this result as



                                                                 normal/abnormal

.

 

             Lab Interpretation (test Abnormal                               



             code = 50496-1)                                        



[facility] [id number] 16:28:42





             Test Item    Value        Reference Range Interpretation Comments

 

             OSMOLALITY (test code =              See_Comment  H             [Au

tomated message]



             2692-2)                                             The system [employer]



                                                                 generated this 

result



                                                                 transmitted ref

erence



                                                                 range: 278 - 30

5



                                                                 mOsm/kg. The



                                                                 reference range

 was



                                                                 not used to int

erpret



                                                                 this result as



                                                                 normal/abnormal

.

 

             Lab Interpretation (test Abnormal                               



             code = 34740-1)                                        



[facility] Metabolic Panel (Na, K, Cl, CO2, 
Glucose, BUN, Creatinine, Ca)2022 16:02:27





             Test Item    Value        Reference Range Interpretation Comments

 

             NA (test code = 138 mmol/L   135-145                   



             1249874056)                                         

 

             K (test code = 3.7 mmol/L   3.5-5                     



             3653931252)                                         

 

             CL (test code = 117 mmol/L          H            



             3955200865)                                         

 

             CO2 TOTAL (test code = 15 mmol/L    23-31        L            



             2803364172)                                         

 

             AGAP (test code =              2-16                      



             2927273104)                                         

 

             BUN (test code = 7 mg/dL      7-23                      



             0452383849)                                         

 

             GLUCOSE (test code = 204 mg/dL           H            



             3045712502)                                         

 

             CREATININE (test code = 0.41 mg/dL   0.6-1.25     L            



             0622287778)                                         

 

             CALCIUM (test code = 8.3 mg/dL    8.6-10.6     L            



             5023809804)                                         

 

             eGFR (test code =              mL/min/1.73m2              



             1915546364)                                         

 

             MAL (test code = MAL) Association of                           



                          Glomerular Filtration                           



                          Rate (GFR) and Staging                           



                          of Kidney Disease*                           



                          +---------------------                           



                          --+-------------------                           



                          --+-------------------                           



                          ------+| GFR                           



                          (mL/min/1.73 m2) ?|                           



                          With Kidney Damage ?|                           



                          ?Without Kidney                           



                          Damage+---------------                           



                          --------+-------------                           



                          --------+-------------                           



                          ------------+| ?>90 ?                           



                          ? ? ? ? ? ? ? ?|                           



                          ?Stage one ? ? ? ? ?|                           



                          ? Normal ? ? ? ? ? ? ?                           



                          ?+--------------------                           



                          ---+------------------                           



                          ---+------------------                           



                          -------+| ?60-89 ? ? ?                           



                          ? ? ? ? ?| ?Stage two                           



                          ? ? ? ? ?| ? Decreased                           



                          GFR ? ? ? ?                            



                          +---------------------                           



                          --+-------------------                           



                          --+-------------------                           



                          ------+| ?30-59 ? ? ?                           



                          ? ? ? ? ?| ?Stage                           



                          three ? ? ? ?| ? Stage                           



                          three ? ? ? ? ?                           



                          +---------------------                           



                          --+-------------------                           



                          --+-------------------                           



                          ------+| ?15-29 ? ? ?                           



                          ? ? ? ? ?| ?Stage four                           



                          ? ? ? ? | ? Stage four                           



                          ? ? ? ? ?                              



                          ?+--------------------                           



                          ---+------------------                           



                          ---+------------------                           



                          -------+| ?<15 (or                           



                          dialysis) ? ?| ?Stage                           



                          five ? ? ? ? | ? Stage                           



                          five ? ? ? ? ?                           



                          ?+--------------------                           



                          ---+------------------                           



                          ---+------------------                           



                          -------+ *Each stage                           



                          assumes the associated                           



                          GFR level has been in                           



                          effect for at least                           



                          three months. ?Stages                           



                          1 to 5, with or                           



                          without kidney                           



                          disease, indicate                           



                          chronic kidney                           



                          disease. Notes:                           



                          Determination of                           



                          stages one and two                           



                          (with eGFR                             



                          >59mL/min/1.73 m2)                           



                          requires estimation of                           



                          kidney damage for at                           



                          least three months as                           



                          defined by structural                           



                          or functional                           



                          abnormalities of the                           



                          kidney, manifested by                           



                          either:Pathological                           



                          abnormalities or                           



                          Markers of kidney                           



                          damage (including                           



                          abnormalities in the                           



                          composition of the                           



                          blood or urine or                           



                          abnormalities in                           



                          imaging tests).                           

 

             Lab Interpretation Abnormal                               



             (test code = 41213-7)                                        



[facility] Metabolic Panel (Na, K, Cl, CO2, 
Glucose, BUN, Creatinine, Ca)2022 16:02:27





             Test Item    Value        Reference Range Interpretation Comments

 

             NA (test code = 138 mmol/L   135-145                   



             [id number])                                         

 

             K (test code = 3.7 mmol/L   3.5-5                     



             5174932642)                                         

 

             CL (test code = 117 mmol/L          H            



             6486981014)                                         

 

             CO2 TOTAL (test code = 15 mmol/L    23-31        L            



             7394562170)                                         

 

             AGAP (test code =              2-16                      



             6796125778)                                         

 

             BUN (test code = 7 mg/dL      7-23                      



             4433456613)                                         

 

             GLUCOSE (test code = 204 mg/dL           H            



             1609156455)                                         

 

             CREATININE (test code = 0.41 mg/dL   0.6-1.25     L            



             8114524386)                                         

 

             CALCIUM (test code = 8.3 mg/dL    8.6-10.6     L            



             5159024135)                                         

 

             eGFR (test code =              mL/min/1.73m2              



             9411970784)                                         

 

             MAL (test code = MAL) Association of                           



                          Glomerular Filtration                           



                          Rate (GFR) and Staging                           



                          of Kidney Disease*                           



                          +---------------------                           



                          --+-------------------                           



                          --+-------------------                           



                          ------+| GFR                           



                          (mL/min/1.73 m2) ?|                           



                          With Kidney Damage ?|                           



                          ?Without Kidney                           



                          Damage+---------------                           



                          --------+-------------                           



                          --------+-------------                           



                          ------------+| ?>90 ?                           



                          ? ? ? ? ? ? ? ?|                           



                          ?Stage one ? ? ? ? ?|                           



                          ? Normal ? ? ? ? ? ? ?                           



                          ?+--------------------                           



                          ---+------------------                           



                          ---+------------------                           



                          -------+| ?60-89 ? ? ?                           



                          ? ? ? ? ?| ?Stage two                           



                          ? ? ? ? ?| ? Decreased                           



                          GFR ? ? ? ?                            



                          +---------------------                           



                          --+-------------------                           



                          --+-------------------                           



                          ------+| ?30-59 ? ? ?                           



                          ? ? ? ? ?| ?Stage                           



                          three ? ? ? ?| ? Stage                           



                          three ? ? ? ? ?                           



                          +---------------------                           



                          --+-------------------                           



                          --+-------------------                           



                          ------+| ?15-29 ? ? ?                           



                          ? ? ? ? ?| ?Stage four                           



                          ? ? ? ? | ? Stage four                           



                          ? ? ? ? ?                              



                          ?+--------------------                           



                          ---+------------------                           



                          ---+------------------                           



                          -------+| ?<15 (or                           



                          dialysis) ? ?| ?Stage                           



                          five ? ? ? ? | ? Stage                           



                          five ? ? ? ? ?                           



                          ?+--------------------                           



                          ---+------------------                           



                          ---+------------------                           



                          -------+ *Each stage                           



                          assumes the associated                           



                          GFR level has been in                           



                          effect for at least                           



                          three months. ?Stages                           



                          1 to 5, with or                           



                          without kidney                           



                          disease, indicate                           



                          chronic kidney                           



                          disease. Notes:                           



                          Determination of                           



                          stages one and two                           



                          (with eGFR                             



                          >59mL/min/1.73 m2)                           



                          requires estimation of                           



                          kidney damage for at                           



                          least three months as                           



                          defined by structural                           



                          or functional                           



                          abnormalities of the                           



                          kidney, manifested by                           



                          either:Pathological                           



                          abnormalities or                           



                          Markers of kidney                           



                          damage (including                           



                          abnormalities in the                           



                          composition of the                           



                          blood or urine or                           



                          abnormalities in                           



                          imaging tests).                           

 

             Lab Interpretation Abnormal                               



             (test code = 18568-3)                                        



[facility] GLUCOSE (AUTOMATED)2022 15:54:35





             Test Item    Value        Reference Range Interpretation Comments

 

             POCT GLU (test code = 5663843666) 200 mg/dL           H      

      

 

             Lab Interpretation (test code = Abnormal                           

    



             28465-6)                                            



[facility] GLUCOSE (AUTOMATED)2022 15:54:35





             Test Item    Value        Reference Range Interpretation Comments

 

             POCT GLU (test code = 1556782802) 200 mg/dL           H      

      

 

             Lab Interpretation (test code = Abnormal                           

    



             34983-2)                                            



[facility] GLUCOSE (AUTOMATED)2022 14:57:17





             Test Item    Value        Reference Range Interpretation Comments

 

             POCT GLU (test code = 7465119247) 211 mg/dL           H      

      

 

             Lab Interpretation (test code = Abnormal                           

    



             24700-7)                                            



[facility] GLUCOSE (AUTOMATED)2022 14:57:17





             Test Item    Value        Reference Range Interpretation Comments

 

             POCT GLU (test code = 6046333642) 211 mg/dL           H      

      

 

             Lab Interpretation (test code = Abnormal                           

    



             10960-4)                                            



[facility] GLUCOSE (AUTOMATED)2022 13:50:06





             Test Item    Value        Reference Range Interpretation Comments

 

             POCT GLU (test code = 3363093398) 216 mg/dL           H      

      

 

             Lab Interpretation (test code = Abnormal                           

    



             02808-4)                                            



[facility] GLUCOSE (AUTOMATED)2022 13:50:06





             Test Item    Value        Reference Range Interpretation Comments

 

             POCT GLU (test code = 3089510152) 216 mg/dL           H      

      

 

             Lab Interpretation (test code = Abnormal                           

    



             33454-9)                                            



[facility] GLUCOSE (AUTOMATED)2022 10:43:13





             Test Item    Value        Reference Range Interpretation Comments

 

             POCT GLU (test code = 8786506694) 199 mg/dL           H      

      

 

             Lab Interpretation (test code = Abnormal                           

    



             67343-2)                                            



[facility] GLUCOSE (AUTOMATED)2022 10:43:13





             Test Item    Value        Reference Range Interpretation Comments

 

             POCT GLU (test code = 8609664409) 199 mg/dL           H      

      

 

             Lab Interpretation (test code = Abnormal                           

    



             62028-1)                                            



[facility] GLUCOSE (AUTOMATED)2022 09:31:59





             Test Item    Value        Reference Range Interpretation Comments

 

             POCT GLU (test code = 9932577086) 172 mg/dL           H      

      

 

             Lab Interpretation (test code = Abnormal                           

    



             11075-7)                                            



[facility]





             Test Item    Value        Reference Range Interpretation Comments

 

             POCT GLU (test code = 1271092906) 172 mg/dL           H      

      

 

             Lab Interpretation (test code = Abnormal                           

    



             25232-7)                                            



[facility] Metabolic Panel (Na, K, Cl, CO2, 
Glucose, BUN, Creatinine, Ca)2022 08:02:13





             Test Item    Value        Reference Range Interpretation Comments

 

             NA (test code = 140 mmol/L   135-145                   



             9150921446)                                         

 

             K (test code = 3.8 mmol/L   3.5-5                     



             8954216376)                                         

 

             CL (test code = 110 mmol/L          H            



             6767639832)                                         

 

             CO2 TOTAL (test code = 13 mmol/L    23-31        L            



             2709837592)                                         

 

             AGAP (test code =              2-16         H            



             9508991184)                                         

 

             BUN (test code = 10 mg/dL     7-23                      



             9811613139)                                         

 

             GLUCOSE (test code = 223 mg/dL           H            



             7178130342)                                         

 

             CREATININE (test code = 0.58 mg/dL   0.6-1.25     L            



             5196155145)                                         

 

             CALCIUM (test code = 8.8 mg/dL    8.6-10.6                  



             9429809302)                                         

 

             eGFR (test code =              mL/min/1.73m2              



             6541692992)                                         

 

             MAL (test code = MAL) Association of                           



                          Glomerular Filtration                           



                          Rate (GFR) and Staging                           



                          of Kidney Disease*                           



                          +---------------------                           



                          --+-------------------                           



                          --+-------------------                           



                          ------+| GFR                           



                          (mL/min/1.73 m2) ?|                           



                          With Kidney Damage ?|                           



                          ?Without Kidney                           



                          Damage+---------------                           



                          --------+-------------                           



                          --------+-------------                           



                          ------------+| ?>90 ?                           



                          ? ? ? ? ? ? ? ?|                           



                          ?Stage one ? ? ? ? ?|                           



                          ? Normal ? ? ? ? ? ? ?                           



                          ?+--------------------                           



                          ---+------------------                           



                          ---+------------------                           



                          -------+| ?60-89 ? ? ?                           



                          ? ? ? ? ?| ?Stage two                           



                          ? ? ? ? ?| ? Decreased                           



                          GFR ? ? ? ?                            



                          +---------------------                           



                          --+-------------------                           



                          --+-------------------                           



                          ------+| ?30-59 ? ? ?                           



                          ? ? ? ? ?| ?Stage                           



                          three ? ? ? ?| ? Stage                           



                          three ? ? ? ? ?                           



                          +---------------------                           



                          --+-------------------                           



                          --+-------------------                           



                          ------+| ?15-29 ? ? ?                           



                          ? ? ? ? ?| ?Stage four                           



                          ? ? ? ? | ? Stage four                           



                          ? ? ? ? ?                              



                          ?+--------------------                           



                          ---+------------------                           



                          ---+------------------                           



                          -------+| ?<15 (or                           



                          dialysis) ? ?| ?Stage                           



                          five ? ? ? ? | ? Stage                           



                          five ? ? ? ? ?                           



                          ?+--------------------                           



                          ---+------------------                           



                          ---+------------------                           



                          -------+ *Each stage                           



                          assumes the associated                           



                          GFR level has been in                           



                          effect for at least                           



                          three months. ?Stages                           



                          1 to 5, with or                           



                          without kidney                           



                          disease, indicate                           



                          chronic kidney                           



                          disease. Notes:                           



                          Determination of                           



                          stages one and two                           



                          (with eGFR                             



                          >59mL/min/1.73 m2)                           



                          requires estimation of                           



                          kidney damage for at                           



                          least three months as                           



                          defined by structural                           



                          or functional                           



                          abnormalities of the                           



                          kidney, manifested by                           



                          either:Pathological                           



                          abnormalities or                           



                          Markers of kidney                           



                          damage (including                           



                          abnormalities in the                           



                          composition of the                           



                          blood or urine or                           



                          abnormalities in                           



                          imaging tests).                           

 

             Lab Interpretation Abnormal                               



             (test code = 23307-5)                                        



[facility] Metabolic Panel (Na, K, Cl, CO2, 
Glucose, BUN, Creatinine, Ca)2022 08:02:13





             Test Item    Value        Reference Range Interpretation Comments

 

             NA (test code = 140 mmol/L   135-145                   



             [id number])                                         

 

             K (test code = 3.8 mmol/L   3.5-5                     



             7228832391)                                         

 

             CL (test code = 110 mmol/L          H            



             7535400703)                                         

 

             CO2 TOTAL (test code = 13 mmol/L    23-31        L            



             0452096123)                                         

 

             AGAP (test code =              2-16         H            



             0867073391)                                         

 

             BUN (test code = 10 mg/dL     7-23                      



             2114665482)                                         

 

             GLUCOSE (test code = 223 mg/dL           H            



             1081090316)                                         

 

             CREATININE (test code = 0.58 mg/dL   0.6-1.25     L            



             8537873872)                                         

 

             CALCIUM (test code = 8.8 mg/dL    8.6-10.6                  



             6226092703)                                         

 

             eGFR (test code =              mL/min/1.73m2              



             6468665131)                                         

 

             MAL (test code = MAL) Association of                           



                          Glomerular Filtration                           



                          Rate (GFR) and Staging                           



                          of Kidney Disease*                           



                          +---------------------                           



                          --+-------------------                           



                          --+-------------------                           



                          ------+| GFR                           



                          (mL/min/1.73 m2) ?|                           



                          With Kidney Damage ?|                           



                          ?Without Kidney                           



                          Damage+---------------                           



                          --------+-------------                           



                          --------+-------------                           



                          ------------+| ?>90 ?                           



                          ? ? ? ? ? ? ? ?|                           



                          ?Stage one ? ? ? ? ?|                           



                          ? Normal ? ? ? ? ? ? ?                           



                          ?+--------------------                           



                          ---+------------------                           



                          ---+------------------                           



                          -------+| ?60-89 ? ? ?                           



                          ? ? ? ? ?| ?Stage two                           



                          ? ? ? ? ?| ? Decreased                           



                          GFR ? ? ? ?                            



                          +---------------------                           



                          --+-------------------                           



                          --+-------------------                           



                          ------+| ?30-59 ? ? ?                           



                          ? ? ? ? ?| ?Stage                           



                          three ? ? ? ?| ? Stage                           



                          three ? ? ? ? ?                           



                          +---------------------                           



                          --+-------------------                           



                          --+-------------------                           



                          ------+| ?15-29 ? ? ?                           



                          ? ? ? ? ?| ?Stage four                           



                          ? ? ? ? | ? Stage four                           



                          ? ? ? ? ?                              



                          ?+--------------------                           



                          ---+------------------                           



                          ---+------------------                           



                          -------+| ?<15 (or                           



                          dialysis) ? ?| ?Stage                           



                          five ? ? ? ? | ? Stage                           



                          five ? ? ? ? ?                           



                          ?+--------------------                           



                          ---+------------------                           



                          ---+------------------                           



                          -------+ *Each stage                           



                          assumes the associated                           



                          GFR level has been in                           



                          effect for at least                           



                          three months. ?Stages                           



                          1 to 5, with or                           



                          without kidney                           



                          disease, indicate                           



                          chronic kidney                           



                          disease. Notes:                           



                          Determination of                           



                          stages one and two                           



                          (with eGFR                             



                          >59mL/min/1.73 m2)                           



                          requires estimation of                           



                          kidney damage for at                           



                          least three months as                           



                          defined by structural                           



                          or functional                           



                          abnormalities of the                           



                          kidney, manifested by                           



                          either:Pathological                           



                          abnormalities or                           



                          Markers of kidney                           



                          damage (including                           



                          abnormalities in the                           



                          composition of the                           



                          blood or urine or                           



                          abnormalities in                           



                          imaging tests).                           

 

             Lab Interpretation Abnormal                               



             (test code = 12590-0)                                        



[facility] GLUCOSE (AUTOMATED)2022 07:26:01





             Test Item    Value        Reference Range Interpretation Comments

 

             POCT GLU (test code = 2710656439) 245 mg/dL           H      

      

 

             Lab Interpretation (test code = Abnormal                           

    



             50681-1)                                            



[facility] GLUCOSE (AUTOMATED)2022 07:26:01





             Test Item    Value        Reference Range Interpretation Comments

 

             POCT GLU (test code = 0455554076) 245 mg/dL           H      

      

 

             Lab Interpretation (test code = Abnormal                           

    



             02872-6)                                            



[facility] GLUCOSE(AGE &gt;30DAYS)2022 
07:11:00





             Test Item    Value        Reference Range Interpretation Comments

 

             POCT Glu (age>30days) (test code = 245 mg/dL                 

       



             3342)                                               

 

             Lab Interpretation (test code = Normal                             

    



             64036-8)                                            



[facility] GLUCOSE(AGE &gt;30DAYS)2022 
07:11:00





             Test Item    Value        Reference Range Interpretation Comments

 

             POCT Glu (age>30days) (test code = 245 mg/dL                 

       



             3342)                                               

 

             Lab Interpretation (test code = Normal                             

    



             98720-7)                                            



[facility] GLUCOSE(AGE &gt;30DAYS)2022 
07:11:00





             Test Item    Value        Reference Range Interpretation Comments

 

             POCT Glu (age>30days) (test code = 245 mg/dL                 

       



             3342)                                               

 

             Lab Interpretation (test code = Normal                             

    



             85255-3)                                            



[facility] 
[facility]





             Test Item    Value        Reference Range Interpretation Comments

 

             HGB A1C (test code = 12.7 %       4-5.7        H            



             4548-4)                                             

 

             MAL (test code = MAL) Reference                              



                          RangesNormal:                           



                          <5.7%Prediabetes:                           



                          5.7 - 6.4%Diabetes:                           



                          > 6.5%                                 

 

             Lab Interpretation (test Abnormal                               



             code = 36977-7)                                        



[facility] 
05:57:25





             Test Item    Value        Reference Range Interpretation Comments

 

             HGB A1C (test code = 12.7 %       4-5.7        H            



             4548-4)                                             

 

             MAL (test code = MAL) Reference                              



                          RangesNormal:                           



                          <5.7%Prediabetes:                           



                          5.7 - 6.4%Diabetes:                           



                          > 6.5%                                 

 

             Lab Interpretation (test Abnormal                               



             code = 37367-7)                                        



[facility] 
05:57:25





             Test Item    Value        Reference Range Interpretation Comments

 

             HGB A1C (test code = 12.7 %       4-5.7        H            



             4548-4)                                             

 

             MAL (test code = MAL) Reference                              



                          RangesNormal:                           



                          <5.7%Prediabetes:                           



                          5.7 - 6.4%Diabetes:                           



                          > 6.5%                                 

 

             Lab Interpretation (test Abnormal                               



             code = 49630-9)                                        



[id number] 05:46:44





             Test Item    Value        Reference Range Interpretation Comments

 

             MAGNESIUM (test code = 6180374330) 1.7 mg/dL    1.7-2.4            

       

 

             Lab Interpretation (test code = Normal                             

    



             81723-7)                                            



[id number]44





             Test Item    Value        Reference Range Interpretation Comments

 

             MAGNESIUM (test code = 0518652939) 1.7 mg/dL    1.7-2.4            

       

 

             Lab Interpretation (test code = Normal                             

    



             37478-6)                                            



[facility] [id number] 05:46:24





             Test Item    Value        Reference Range Interpretation Comments

 

             PHOSPHORUS (test code = 4132046915) 4.0 mg/dL    2.5-5             

        

 

             Lab Interpretation (test code = Normal                             

    



             20139-3)                                            



[facility] [id number] 05:46:24





             Test Item    Value        Reference Range Interpretation Comments

 

             PHOSPHORUS (test code = 7830319476) 4.0 mg/dL    2.5-5             

        

 

             Lab Interpretation (test code = Normal                             

    



             58702-0)                                            



[facility] [id number] 05:46:24





             Test Item    Value        Reference Range Interpretation Comments

 

             PHOSPHORUS (test code = 9243619127) 4.0 mg/dL    2.5-5             

        

 

             Lab Interpretation (test code = Normal                             

    



             83020-4)                                            



[facility] GLUCOSE (AUTOMATED)2022 05:11:10





             Test Item    Value        Reference Range Interpretation Comments

 

             POCT GLU (test code = 8313479144) 410 mg/dL           H      

      

 

             Lab Interpretation (test code = Abnormal                           

    



             25672-6)                                            



[facility] GLUCOSE (AUTOMATED)2022 05:11:10





             Test Item    Value        Reference Range Interpretation Comments

 

             POCT GLU (test code = 6467954925) 410 mg/dL           H      

      

 

             Lab Interpretation (test code = Abnormal                           

    



             11670-1)                                            



[facility] GLUCOSE(AGE &gt;30DAYS)2022 
05:11:00





             Test Item    Value        Reference Range Interpretation Comments

 

             POCT Glu (age>30days) (test code = 410 mg/dL           A     

       



             3342)                                               

 

             Lab Interpretation (test code = Abnormal                           

    



             55487-6)                                            



[facility] GLUCOSE(AGE &gt;30DAYS)2022 
05:11:00





             Test Item    Value        Reference Range Interpretation Comments

 

             POCT Glu (age>30days) (test code = 410 mg/dL           A     

       



             3342)                                               

 

             Lab Interpretation (test code = Abnormal                           

    



             88264-6)                                            



[facility] WITH [id number] 05:03:57





             Test Item    Value        Reference Range Interpretation Comments

 

             WBC (test code =              See_Comment                [Automated



             6690-2)                                             message] The sy

stem



                                                                 which generated



                                                                 this result



                                                                 transmitted



                                                                 reference range

:



                                                                 4.20 - 10.70



                                                                 10*3/?L. The



                                                                 reference range

 was



                                                                 not used to



                                                                 interpret this



                                                                 result as



                                                                 normal/abnormal

.

 

             RBC (test code =              See_Comment                [Automated



             789-8)                                              message] The sy

stem



                                                                 which generated



                                                                 this result



                                                                 transmitted



                                                                 reference range

:



                                                                 4.26 - 5.52



                                                                 10*6/?L. The



                                                                 reference range

 was



                                                                 not used to



                                                                 interpret this



                                                                 result as



                                                                 normal/abnormal

.

 

             HGB (test code = 13.9 g/dL    12.2-16.4                 



             718-7)                                              

 

             HCT (test code = 42.6 %       38.4-49.3                 



             4544-3)                                             

 

             MCV (test code = 88.2 fL      81.7-95.6                 



             787-2)                                              

 

             MCH (test code = 28.8 pg      26.1-32.7                 



             785-6)                                              

 

             MCHC (test code = 32.6 g/dL    31.2-35                   



             786-4)                                              

 

             RDW-SD (test code = 44.1 fL      38.5-51.6                 



             48553-1)                                            

 

             RDW-CV (test code = 13.8 %       12.1-15.4                 



             788-0)                                              

 

             PLT (test code =              See_Comment  H             [Automated



             777-3)                                              message] The sy

stem



                                                                 which generated



                                                                 this result



                                                                 transmitted



                                                                 reference range

:



                                                                 150 - 328 10*3/

?L.



                                                                 The reference r

zhen



                                                                 was not used to



                                                                 interpret this



                                                                 result as



                                                                 normal/abnormal

.

 

             MPV (test code = 12.2 fL      9.8-13                    



             39262-5)                                            

 

             IPF % (test code = 9.4 %        1.2-10.7                  Platelet 

count



             [id number])                                         measured by



                                                                 fluorescence



                                                                 method.

 

             NRBC/100 WBC (test              See_Comment                [Automat

ed



             code = 3866948721)                                        message] 

The system



                                                                 which generated



                                                                 this result



                                                                 transmitted



                                                                 reference range

:



                                                                 0.0 - 10.0 /100



                                                                 WBCs. The refer

ence



                                                                 range was not u

sed



                                                                 to interpret th

is



                                                                 result as



                                                                 normal/abnormal

.

 

             NRBC x10^3 (test code              See_Comment                [Auto

mated



             = 1885672654)                                        message] The s

ystem



                                                                 which generated



                                                                 this result



                                                                 transmitted



                                                                 reference range

:



                                                                 10*3/?L. The



                                                                 reference range

 was



                                                                 not used to



                                                                 interpret this



                                                                 result as



                                                                 normal/abnormal

.

 

             GRAN MAT (NEUT) % 65.1 %                                 



             (test code = 770-8)                                        

 

             IMM GRAN % (test code 1.10 %                                 



             = 6934805639)                                        

 

             LYMPH % (test code = 22.4 %                                 



             736-9)                                              

 

             MONO % (test code = 9.8 %                                  



             5905-5)                                             

 

             EOS % (test code = 1.4 %                                  



             713-8)                                              

 

             BASO % (test code = 0.2 %                                  



             706-2)                                              

 

             GRAN MAT x10^3(ANC) 6.15 10*3/uL 1.99-6.95                 



             (test code =                                        



             9476426467)                                         

 

             IMM GRAN x10^3 (test 0.10 10*3/uL 0-0.06       H            



             code = 6578631286)                                        

 

             LYMPH x10^3 (test code 2.11 10*3/uL 1.09-3.23                 



             = 731-0)                                            

 

             MONO x10^3 (test code 0.92 10*3/uL 0.36-1.02                 



             = 742-7)                                            

 

             EOS x10^3 (test code = 0.13 10*3/uL 0.06-0.53                 



             711-2)                                              

 

             BASO x10^3 (test code              0.01-0.09                 



             = 704-7)                                            

 

             Lab Interpretation Abnormal                               



             (test code = 07714-4)                                        



[facility] WITH [id number] 05:03:57





             Test Item    Value        Reference Range Interpretation Comments

 

             WBC (test code =              See_Comment                [Automated



             2490-2)                                             message] The sy

stem



                                                                 which generated



                                                                 this result



                                                                 transmitted



                                                                 reference range

:



                                                                 4.20 - 10.70



                                                                 10*3/?L. The



                                                                 reference range

 was



                                                                 not used to



                                                                 interpret this



                                                                 result as



                                                                 normal/abnormal

.

 

             RBC (test code =              See_Comment                [Automated



             725-8)                                              message] The sy

stem



                                                                 which generated



                                                                 this result



                                                                 transmitted



                                                                 reference range

:



                                                                 4.26 - 5.52



                                                                 10*6/?L. The



                                                                 reference range

 was



                                                                 not used to



                                                                 interpret this



                                                                 result as



                                                                 normal/abnormal

.

 

             HGB (test code = 13.9 g/dL    12.2-16.4                 



             718-7)                                              

 

             HCT (test code = 42.6 %       38.4-49.3                 



             4544-3)                                             

 

             MCV (test code = 88.2 fL      81.7-95.6                 



             787-2)                                              

 

             MCH (test code = 28.8 pg      26.1-32.7                 



             785-6)                                              

 

             MCHC (test code = 32.6 g/dL    31.2-35                   



             786-4)                                              

 

             RDW-SD (test code = 44.1 fL      38.5-51.6                 



             98156-5)                                            

 

             RDW-CV (test code = 13.8 %       12.1-15.4                 



             788-0)                                              

 

             PLT (test code =              See_Comment  H             [Automated



             777-3)                                              message] The sy

stem



                                                                 which generated



                                                                 this result



                                                                 transmitted



                                                                 reference range

:



                                                                 150 - 328 10*3/

?L.



                                                                 The reference r

zhen



                                                                 was not used to



                                                                 interpret this



                                                                 result as



                                                                 normal/abnormal

.

 

             MPV (test code = 12.2 fL      9.8-13                    



             29038-9)                                            

 

             IPF % (test code = 9.4 %        1.2-10.7                  Platelet 

count



             [id number])                                         measured by



                                                                 fluorescence



                                                                 method.

 

             NRBC/100 WBC (test              See_Comment                [Automat

ed



             code = 9372400486)                                        message] 

The system



                                                                 which generated



                                                                 this result



                                                                 transmitted



                                                                 reference range

:



                                                                 0.0 - 10.0 /100



                                                                 WBCs. The refer

ence



                                                                 range was not u

sed



                                                                 to interpret th

is



                                                                 result as



                                                                 normal/abnormal

.

 

             NRBC x10^3 (test code              See_Comment                [Auto

mated



             = [id number])                                        message] The s

ystem



                                                                 which generated



                                                                 this result



                                                                 transmitted



                                                                 reference range

:



                                                                 10*3/?L. The



                                                                 reference range

 was



                                                                 not used to



                                                                 interpret this



                                                                 result as



                                                                 normal/abnormal

.

 

             GRAN MAT (NEUT) % 65.1 %                                 



             (test code = 770-8)                                        

 

             IMM GRAN % (test code 1.10 %                                 



             = [id number])                                        

 

             LYMPH % (test code = 22.4 %                                 



             736-9)                                              

 

             MONO % (test code = 9.8 %                                  



             5905-5)                                             

 

             EOS % (test code = 1.4 %                                  



             713-8)                                              

 

             BASO % (test code = 0.2 %                                  



             706-2)                                              

 

             GRAN MAT x10^3(ANC) 6.15 10*3/uL 1.99-6.95                 



             (test code =                                        



             8327822603)                                         

 

             IMM GRAN x10^3 (test 0.10 10*3/uL 0-0.06       H            



             code = 7592749795)                                        

 

             LYMPH x10^3 (test code 2.11 10*3/uL 1.09-3.23                 



             = 731-0)                                            

 

             MONO x10^3 (test code 0.92 10*3/uL 0.36-1.02                 



             = 742-7)                                            

 

             EOS x10^3 (test code = 0.13 10*3/uL 0.06-0.53                 



             711-2)                                              

 

             BASO x10^3 (test code              0.01-0.09                 



             = 704-7)                                            

 

             Lab Interpretation Abnormal                               



             (test code = 28619-8)                                        



[facility] K, CL, CO2, 
GLUCOSE, BUN, CREATININE, CA)2022 04:46:12





             Test Item    Value        Reference Range Interpretation Comments

 

             NA (test code = 136 mmol/L   135-145                   



             4485021729)                                         

 

             K (test code = 5.1 mmol/L   3.5-5        H            



             1429510637)                                         

 

             CL (test code = 106 mmol/L                       



             3550479890)                                         

 

             CO2 TOTAL (test code = 9 mmol/L     23-31        L            



             1341546530)                                         

 

             AGAP (test code =              2-16         H            



             3862259061)                                         

 

             BUN (test code = 11 mg/dL     7-23                      



             9131420101)                                         

 

             GLUCOSE (test code = 405 mg/dL           H            



             3355948746)                                         

 

             CREATININE (test code = 0.62 mg/dL   0.6-1.25                  



             8118598961)                                         

 

             CALCIUM (test code = 9.5 mg/dL    8.6-10.6                  



             8989545655)                                         

 

             eGFR (test code =              mL/min/1.73m2              



             1589096822)                                         

 

             MAL (test code = MAL) Association of                           



                          Glomerular Filtration                           



                          Rate (GFR) and Staging                           



                          of Kidney Disease*                           



                          +---------------------                           



                          --+-------------------                           



                          --+-------------------                           



                          ------+| GFR                           



                          (mL/min/1.73 m2) ?|                           



                          With Kidney Damage ?|                           



                          ?Without Kidney                           



                          Damage+---------------                           



                          --------+-------------                           



                          --------+-------------                           



                          ------------+| ?>90 ?                           



                          ? ? ? ? ? ? ? ?|                           



                          ?Stage one ? ? ? ? ?|                           



                          ? Normal ? ? ? ? ? ? ?                           



                          ?+--------------------                           



                          ---+------------------                           



                          ---+------------------                           



                          -------+| ?60-89 ? ? ?                           



                          ? ? ? ? ?| ?Stage two                           



                          ? ? ? ? ?| ? Decreased                           



                          GFR ? ? ? ?                            



                          +---------------------                           



                          --+-------------------                           



                          --+-------------------                           



                          ------+| ?30-59 ? ? ?                           



                          ? ? ? ? ?| ?Stage                           



                          three ? ? ? ?| ? Stage                           



                          three ? ? ? ? ?                           



                          +---------------------                           



                          --+-------------------                           



                          --+-------------------                           



                          ------+| ?15-29 ? ? ?                           



                          ? ? ? ? ?| ?Stage four                           



                          ? ? ? ? | ? Stage four                           



                          ? ? ? ? ?                              



                          ?+--------------------                           



                          ---+------------------                           



                          ---+------------------                           



                          -------+| ?<15 (or                           



                          dialysis) ? ?| ?Stage                           



                          five ? ? ? ? | ? Stage                           



                          five ? ? ? ? ?                           



                          ?+--------------------                           



                          ---+------------------                           



                          ---+------------------                           



                          -------+ *Each stage                           



                          assumes the associated                           



                          GFR level has been in                           



                          effect for at least                           



                          three months. ?Stages                           



                          1 to 5, with or                           



                          without kidney                           



                          disease, indicate                           



                          chronic kidney                           



                          disease. Notes:                           



                          Determination of                           



                          stages one and two                           



                          (with eGFR                             



                          >59mL/min/1.73 m2)                           



                          requires estimation of                           



                          kidney damage for at                           



                          least three months as                           



                          defined by structural                           



                          or functional                           



                          abnormalities of the                           



                          kidney, manifested by                           



                          either:Pathological                           



                          abnormalities or                           



                          Markers of kidney                           



                          damage (including                           



                          abnormalities in the                           



                          composition of the                           



                          blood or urine or                           



                          abnormalities in                           



                          imaging tests).                           

 

             Lab Interpretation Abnormal                               



             (test code = 96535-5)                                        



[facility] METABOLIC PANEL (NA, K, CL, CO2, 
GLUCOSE, BUN, CREATININE, CA)2022 04:46:12





             Test Item    Value        Reference Range Interpretation Comments

 

             NA (test code = 136 mmol/L   135-145                   



             0359538080)                                         

 

             K (test code = 5.1 mmol/L   3.5-5        H            



             0712755929)                                         

 

             CL (test code = 106 mmol/L                       



             8728119446)                                         

 

             CO2 TOTAL (test code = 9 mmol/L     23-31        L            



             2103312325)                                         

 

             AGAP (test code =              2-16         H            



             6233766651)                                         

 

             BUN (test code = 11 mg/dL     7-23                      



             2306864346)                                         

 

             GLUCOSE (test code = 405 mg/dL           H            



             4297414205)                                         

 

             CREATININE (test code = 0.62 mg/dL   0.6-1.25                  



             7126955417)                                         

 

             CALCIUM (test code = 9.5 mg/dL    8.6-10.6                  



             9158326741)                                         

 

             eGFR (test code =              mL/min/1.73m2              



             2847720760)                                         

 

             MAL (test code = MAL) Association of                           



                          Glomerular Filtration                           



                          Rate (GFR) and Staging                           



                          of Kidney Disease*                           



                          +---------------------                           



                          --+-------------------                           



                          --+-------------------                           



                          ------+| GFR                           



                          (mL/min/1.73 m2) ?|                           



                          With Kidney Damage ?|                           



                          ?Without Kidney                           



                          Damage+---------------                           



                          --------+-------------                           



                          --------+-------------                           



                          ------------+| ?>90 ?                           



                          ? ? ? ? ? ? ? ?|                           



                          ?Stage one ? ? ? ? ?|                           



                          ? Normal ? ? ? ? ? ? ?                           



                          ?+--------------------                           



                          ---+------------------                           



                          ---+------------------                           



                          -------+| ?60-89 ? ? ?                           



                          ? ? ? ? ?| ?Stage two                           



                          ? ? ? ? ?| ? Decreased                           



                          GFR ? ? ? ?                            



                          +---------------------                           



                          --+-------------------                           



                          --+-------------------                           



                          ------+| ?30-59 ? ? ?                           



                          ? ? ? ? ?| ?Stage                           



                          three ? ? ? ?| ? Stage                           



                          three ? ? ? ? ?                           



                          +---------------------                           



                          --+-------------------                           



                          --+-------------------                           



                          ------+| ?15-29 ? ? ?                           



                          ? ? ? ? ?| ?Stage four                           



                          ? ? ? ? | ? Stage four                           



                          ? ? ? ? ?                              



                          ?+--------------------                           



                          ---+------------------                           



                          ---+------------------                           



                          -------+| ?<15 (or                           



                          dialysis) ? ?| ?Stage                           



                          five ? ? ? ? | ? Stage                           



                          five ? ? ? ? ?                           



                          ?+--------------------                           



                          ---+------------------                           



                          ---+------------------                           



                          -------+ *Each stage                           



                          assumes the associated                           



                          GFR level has been in                           



                          effect for at least                           



                          three months. ?Stages                           



                          1 to 5, with or                           



                          without kidney                           



                          disease, indicate                           



                          chronic kidney                           



                          disease. Notes:                           



                          Determination of                           



                          stages one and two                           



                          (with eGFR                             



                          >59mL/min/1.73 m2)                           



                          requires estimation of                           



                          kidney damage for at                           



                          least three months as                           



                          defined by structural                           



                          or functional                           



                          abnormalities of the                           



                          kidney, manifested by                           



                          either:Pathological                           



                          abnormalities or                           



                          Markers of kidney                           



                          damage (including                           



                          abnormalities in the                           



                          composition of the                           



                          blood or urine or                           



                          abnormalities in                           



                          imaging tests).                           

 

             Lab Interpretation Abnormal                               



             (test code = 16433-3)                                        



[facility] [id number] 04:43:36





             Test Item    Value        Reference Range Interpretation Comments

 

             PROTIME PATIENT (test              See_Comment                [Auto

mated message]



             code = 5964-2)                                        The system [employer]



                                                                 generated this 

result



                                                                 transmitted ref

erence



                                                                 range: 12.0 - 1

4.7



                                                                 Seconds. The re

ference



                                                                 range was not u

sed to



                                                                 interpret this 

result



                                                                 as normal/abnor

mal.

 

             INR (test code = 6301-6)                                        Nor

mal INR <1.1;



                                                                 Warfarin Therap

eutic



                                                                 range 2.0 to 3.

0 or



                                                                 2.5 to 3.5, dep

ending



                                                                 upon the indica

tions.

 

             Lab Interpretation (test Normal                                 



             code = 57804-2)                                        



[facility] [id number] 04:43:36





             Test Item    Value        Reference Range Interpretation Comments

 

             PROTIME PATIENT (test              See_Comment                [Auto

mated message]



             code = 5964-2)                                        The system [employer]



                                                                 generated this 

result



                                                                 transmitted ref

erence



                                                                 range: 12.0 - 1

4.7



                                                                 Seconds. The re

ference



                                                                 range was not u

sed to



                                                                 interpret this 

result



                                                                 as normal/abnor

mal.

 

             INR (test code = 6301-6)                                        Nor

mal INR <1.1;



                                                                 Warfarin Therap

eutic



                                                                 range 2.0 to 3.

0 or



                                                                 2.5 to 3.5, dep

ending



                                                                 upon the indica

tions.

 

             Lab Interpretation (test Normal                                 



             code = 55800-8)                                        



[facility] [id number] 04:43:36





             Test Item    Value        Reference Range Interpretation Comments

 

             PROTIME PATIENT (test              See_Comment                [Auto

mated message]



             code = 5964-2)                                        The system [employer]



                                                                 generated this 

result



                                                                 transmitted ref

erence



                                                                 range: 12.0 - 1

4.7



                                                                 Seconds. The re

ference



                                                                 range was not u

sed to



                                                                 interpret this 

result



                                                                 as normal/abnor

mal.

 

             INR (test code = 6301-6)                                        Nor

mal INR <1.1;



                                                                 Warfarin Therap

eutic



                                                                 range 2.0 to 3.

0 or



                                                                 2.5 to 3.5, dep

ending



                                                                 upon the indica

tions.

 

             Lab Interpretation (test Normal                                 



             code = 52783-6)                                        



[facility]85[facility] (ALB,T.PRO,BILI
T,BU/BC,ALT,AST,ALK PHOS)2022 04:40:15





             Test Item    Value        Reference Range Interpretation Comments

 

             TOTAL BILI (test code = 3603000936) 0.9 mg/dL    0.1-1.1           

        

 

             BILI UNCON (test code = 0040525010) 0.3 mg/dL    0.1-1.1           

        

 

             BILI CONJ (test code = 9444263320) 0.0 mg/dL    0-0.3              

       

 

             T PROTEIN (test code = 1440070615) 7.5 g/dL     6.3-8.2            

       

 

             ALBUMIN (test code = 9417108977) 4.0 g/dL     3.5-5                

     

 

             ALK PHOS (test code = 5652498567) 166 U/L             H      

      

 

             ALTv (test code = 1742-6) 28 U/L       5-50                      

 

             AST(SGOT) (test code = 7133340916) 22 U/L       13-40              

       

 

             Lab Interpretation (test code = Abnormal                           

    



             22332-7)                                            



[facility]03140[facility] (ALB,T.PRO,BILI
T,BU/BC,ALT,AST,ALK PHOS)2022 04:40:15





             Test Item    Value        Reference Range Interpretation Comments

 

             TOTAL BILI (test code = 7831024889) 0.9 mg/dL    0.1-1.1           

        

 

             BILI UNCON (test code = 6265233498) 0.3 mg/dL    0.1-1.1           

        

 

             BILI CONJ (test code = 2991088717) 0.0 mg/dL    0-0.3              

       

 

             T PROTEIN (test code = 6047709528) 7.5 g/dL     6.3-8.2            

       

 

             ALBUMIN (test code = 2339086651) 4.0 g/dL     3.5-5                

     

 

             ALK PHOS (test code = 2511868146) 166 U/L             H      

      

 

             ALTv (test code = 1742-6) 28 U/L       5-50                      

 

             AST(SGOT) (test code = 9429187070) 22 U/L       13-40              

       

 

             Lab Interpretation (test code = Abnormal                           

    



             14310-0)                                            



[facility]39619[facility] (ALB,T.PRO,BILI
T,BU/BC,ALT,AST,ALK PHOS)2022 04:40:15





             Test Item    Value        Reference Range Interpretation Comments

 

             TOTAL BILI (test code = 2641461669) 0.9 mg/dL    0.1-1.1           

        

 

             BILI UNCON (test code = 0534955238) 0.3 mg/dL    0.1-1.1           

        

 

             BILI CONJ (test code = 1678811616) 0.0 mg/dL    0-0.3              

       

 

             T PROTEIN (test code = 8417801639) 7.5 g/dL     6.3-8.2            

       

 

             ALBUMIN (test code = 6562744619) 4.0 g/dL     3.5-5                

     

 

             ALK PHOS (test code = 0157959618) 166 U/L             H      

      

 

             ALTv (test code = 1742-6) 28 U/L       5-50                      

 

             AST(SGOT) (test code = 4387415685) 22 U/L       13-40              

       

 

             Lab Interpretation (test code = Abnormal                           

    



             10136-0)                                            



[facility]





             Test Item    Value        Reference Range Interpretation Comments

 

             LIPASE (test code = 5574699809) 239 U/L      0-220        H        

    

 

             Lab Interpretation (test code = Abnormal                           

    



             98592-0)                                            



[facility]





             Test Item    Value        Reference Range Interpretation Comments

 

             LIPASE (test code = 0581587117) 239 U/L      0-220        H        

    

 

             Lab Interpretation (test code = Abnormal                           

    



             11629-1)                                            



[facility]





             Test Item    Value        Reference Range Interpretation Comments

 

             LIPASE (test code = 7588871845) 239 U/L      0-220        H        

    

 

             Lab Interpretation (test code = Abnormal                           

    



             93089-8)                                            



[facility] [id number] 02:01:26





             Test Item    Value        Reference Range Interpretation Comments

 

             Blood Culture-Aerobic No organisms No growth                 Previo

us



             (test code = 17928-3) isolated                               prelim

inary



                                                                 verified result



                                                                 was Culture In



                                                                 Progress on



                                                                 2022 at 00

02



                                                                 CDTPrevious



                                                                 preliminary



                                                                 verified result



                                                                 was No growth a

t



                                                                 24 hours on



                                                                 2022 at 21

01



                                                                 CDTPrevious



                                                                 preliminary



                                                                 verified result



                                                                 was No growth a

t



                                                                 48 hours on



                                                                 2022 at 21

01



                                                                 CDTPrevious



                                                                 preliminary



                                                                 verified result



                                                                 was No growth a

t



                                                                 72 hours on



                                                                 2022 at 21

01



                                                                 CDT

 

             Blood        No organisms No growth                 Previous



             Culture-Anaerobic isolated                               preliminar

y



             (test code = 71499-2)                                        verifi

ed result



                                                                 was Culture In



                                                                 Progress on



                                                                 2022 at 00

02



                                                                 CDTPrevious



                                                                 preliminary



                                                                 verified result



                                                                 was No growth a

t



                                                                 24 hours on



                                                                 2022 at 21

01



                                                                 CDTPrevious



                                                                 preliminary



                                                                 verified result



                                                                 was No growth a

t



                                                                 48 hours on



                                                                 2022 at 21

01



                                                                 CDTPrevious



                                                                 preliminary



                                                                 verified result



                                                                 was No growth a

t



                                                                 72 hours on



                                                                 2022 at 21

01



                                                                 CDT

 

             Lab Interpretation Normal                                 



             (test code = 34812-8)                                        



[facility] GLUCOSE (AUTOMATED)2022 19:39:16





             Test Item    Value        Reference Range Interpretation Comments

 

             POCT GLU (test code = 4383617478) 153 mg/dL           H      

      

 

             Lab Interpretation (test code = Abnormal                           

    



             66955-9)                                            



[facility] GLUCOSE (AUTOMATED)2022 16:42:59





             Test Item    Value        Reference Range Interpretation Comments

 

             POCT GLU (test code = 2841150230) 311 mg/dL           H      

      

 

             Lab Interpretation (test code = Abnormal                           

    



             42555-7)                                            



[facility] GLUCOSE (AUTOMATED)2022 01:48:17





             Test Item    Value        Reference Range Interpretation Comments

 

             POCT GLU (test code = 2887332160) 253 mg/dL           H      

      

 

             Lab Interpretation (test code = Abnormal                           

    



             67070-9)                                            



[facility] GLUCOSE (AUTOMATED)2022 21:48:07





             Test Item    Value        Reference Range Interpretation Comments

 

             POCT GLU (test code = 7293969792) 279 mg/dL           H      

      

 

             Lab Interpretation (test code = Abnormal                           

    



             02043-1)                                            



[facility] GLUCOSE (AUTOMATED)2022 16:48:09





             Test Item    Value        Reference Range Interpretation Comments

 

             POCT GLU (test code = 0512528701) 275 mg/dL           H      

      

 

             Lab Interpretation (test code = Abnormal                           

    



             18627-3)                                            



[facility] METABOLIC PANEL (NA, K, CL, CO2, 
GLUCOSE, BUN, CREATININE, CA)2022 15:41:54





             Test Item    Value        Reference Range Interpretation Comments

 

             NA (test code = 148 mmol/L   135-145      H            



             3456562797)                                         

 

             K (test code = 4.3 mmol/L   3.5-5                     



             9934168669)                                         

 

             CL (test code = 123 mmol/L          H            



             4848200088)                                         

 

             CO2 TOTAL (test code = 19 mmol/L    23-31        L            



             2081413418)                                         

 

             AGAP (test code =              2-16                      



             1486770403)                                         

 

             BUN (test code = 23 mg/dL     7-23                      



             5229030570)                                         

 

             GLUCOSE (test code = 305 mg/dL           H            



             0950699937)                                         

 

             CREATININE (test code = 0.61 mg/dL   0.6-1.25                  



             0867284010)                                         

 

             CALCIUM (test code = 8.5 mg/dL    8.6-10.6     L            



             7970131765)                                         

 

             eGFR (test code =              mL/min/1.73m2              



             2245694457)                                         

 

             MAL (test code = MAL) Association of                           



                          Glomerular Filtration                           



                          Rate (GFR) and Staging                           



                          of Kidney Disease*                           



                          +---------------------                           



                          --+-------------------                           



                          --+-------------------                           



                          ------+| GFR                           



                          (mL/min/1.73 m2) ?|                           



                          With Kidney Damage ?|                           



                          ?Without Kidney                           



                          Damage+---------------                           



                          --------+-------------                           



                          --------+-------------                           



                          ------------+| ?>90 ?                           



                          ? ? ? ? ? ? ? ?|                           



                          ?Stage one ? ? ? ? ?|                           



                          ? Normal ? ? ? ? ? ? ?                           



                          ?+--------------------                           



                          ---+------------------                           



                          ---+------------------                           



                          -------+| ?60-89 ? ? ?                           



                          ? ? ? ? ?| ?Stage two                           



                          ? ? ? ? ?| ? Decreased                           



                          GFR ? ? ? ?                            



                          +---------------------                           



                          --+-------------------                           



                          --+-------------------                           



                          ------+| ?30-59 ? ? ?                           



                          ? ? ? ? ?| ?Stage                           



                          three ? ? ? ?| ? Stage                           



                          three ? ? ? ? ?                           



                          +---------------------                           



                          --+-------------------                           



                          --+-------------------                           



                          ------+| ?15-29 ? ? ?                           



                          ? ? ? ? ?| ?Stage four                           



                          ? ? ? ? | ? Stage four                           



                          ? ? ? ? ?                              



                          ?+--------------------                           



                          ---+------------------                           



                          ---+------------------                           



                          -------+| ?<15 (or                           



                          dialysis) ? ?| ?Stage                           



                          five ? ? ? ? | ? Stage                           



                          five ? ? ? ? ?                           



                          ?+--------------------                           



                          ---+------------------                           



                          ---+------------------                           



                          -------+ *Each stage                           



                          assumes the associated                           



                          GFR level has been in                           



                          effect for at least                           



                          three months. ?Stages                           



                          1 to 5, with or                           



                          without kidney                           



                          disease, indicate                           



                          chronic kidney                           



                          disease. Notes:                           



                          Determination of                           



                          stages one and two                           



                          (with eGFR                             



                          >59mL/min/1.73 m2)                           



                          requires estimation of                           



                          kidney damage for at                           



                          least three months as                           



                          defined by structural                           



                          or functional                           



                          abnormalities of the                           



                          kidney, manifested by                           



                          either:Pathological                           



                          abnormalities or                           



                          Markers of kidney                           



                          damage (including                           



                          abnormalities in the                           



                          composition of the                           



                          blood or urine or                           



                          abnormalities in                           



                          imaging tests).                           

 

             Lab Interpretation Abnormal                               



             (test code = 45546-4)                                        



[facility]07





             Test Item    Value        Reference Range Interpretation Comments

 

             MAGNESIUM (test code = 3395572399) 1.8 mg/dL    1.7-2.4            

       

 

             Lab Interpretation (test code = Normal                             

    



             39953-1)                                            



[facility] 15:17:07





             Test Item    Value        Reference Range Interpretation Comments

 

             PHOSPHORUS (test code = 8632839191) 2.2 mg/dL    2.5-5        L    

        

 

             Lab Interpretation (test code = Abnormal                           

    



             00159-7)                                            



[facility] GLUCOSE (AUTOMATED)2022 12:48:51





             Test Item    Value        Reference Range Interpretation Comments

 

             POCT GLU (test code = 9590736638) 274 mg/dL           H      

      

 

             Lab Interpretation (test code = Abnormal                           

    



             59331-6)                                            



[facility] GLUCOSE (AUTOMATED)2022 01:10:30





             Test Item    Value        Reference Range Interpretation Comments

 

             POCT GLU (test code = 5996377658) 248 mg/dL           H      

      

 

             Lab Interpretation (test code = Abnormal                           

    



             45385-0)                                            



[facility] GLUCOSE (AUTOMATED)2022 01:10:30





             Test Item    Value        Reference Range Interpretation Comments

 

             POCT GLU (test code = 5188098327) 300 mg/dL           H      

      

 

             Lab Interpretation (test code = Abnormal                           

    



             84007-9)                                            



[facility] GLUCOSE (AUTOMATED)2022 16:52:44





             Test Item    Value        Reference Range Interpretation Comments

 

             POCT GLU (test code = 5760015526) 296 mg/dL           H      

      

 

             Lab Interpretation (test code = Abnormal                           

    



             35958-2)                                            



[facility] GLUCOSE (AUTOMATED)2022 16:52:43





             Test Item    Value        Reference Range Interpretation Comments

 

             POCT GLU (test code = 5247528910) 345 mg/dL           H      

      

 

             Lab Interpretation (test code = Abnormal                           

    



             22572-0)                                            



[facility] GLUCOSE (AUTOMATED)2022 16:52:38





             Test Item    Value        Reference Range Interpretation Comments

 

             POCT GLU (test code = 3181024676) 363 mg/dL           H      

      

 

             Lab Interpretation (test code = Abnormal                           

    



             16932-6)                                            



[facility] GLUCOSE (AUTOMATED)2022 16:52:38





             Test Item    Value        Reference Range Interpretation Comments

 

             POCT GLU (test code = 5567987397) 444 mg/dL           H      

      

 

             Lab Interpretation (test code = Abnormal                           

    



             87982-2)                                            



[facility] GLUCOSE (AUTOMATED)2022 16:52:38





             Test Item    Value        Reference Range Interpretation Comments

 

             POCT GLU (test code = 8289279362) 324 mg/dL           H      

      

 

             Lab Interpretation (test code = Abnormal                           

    



             73621-1)                                            



[facility] GLUCOSE (AUTOMATED)2022 16:52:38





             Test Item    Value        Reference Range Interpretation Comments

 

             POCT GLU (test code = 6900262635) 303 mg/dL           H      

      

 

             Lab Interpretation (test code = Abnormal                           

    



             97926-5)                                            



[facility] GLUCOSE (AUTOMATED)2022 16:52:38





             Test Item    Value        Reference Range Interpretation Comments

 

             POCT GLU (test code = 8673688155) 288 mg/dL           H      

      

 

             Lab Interpretation (test code = Abnormal                           

    



             55816-9)                                            



[facility] GLUCOSE (AUTOMATED)2022 16:37:12





             Test Item    Value        Reference Range Interpretation Comments

 

             POCT GLU (test code = 3774508495) 241 mg/dL           H      

      

 

             Lab Interpretation (test code = Abnormal                           

    



             68454-3)                                            



[facility] GLUCOSE (AUTOMATED)2022 13:14:11





             Test Item    Value        Reference Range Interpretation Comments

 

             POCT GLU (test code = 8348025455) 264 mg/dL           H      

      

 

             Lab Interpretation (test code = Abnormal                           

    



             45332-0)                                            



[facility] GLUCOSE (AUTOMATED)2022 11:04:52





             Test Item    Value        Reference Range Interpretation Comments

 

             POCT GLU (test code = 9401875419) 257 mg/dL           H      

      

 

             Lab Interpretation (test code = Abnormal                           

    



             12023-8)                                            



[facility] GLUCOSE (AUTOMATED)2022 07:51:27





             Test Item    Value        Reference Range Interpretation Comments

 

             POCT GLU (test code = 6333020624) 228 mg/dL           H      

      

 

             Lab Interpretation (test code = Abnormal                           

    



             54133-5)                                            



[facility] GLUCOSE (AUTOMATED)2022 03:43:14





             Test Item    Value        Reference Range Interpretation Comments

 

             POCT GLU (test code = 8399671908) 269 mg/dL           H      

      

 

             Lab Interpretation (test code = Abnormal                           

    



             74736-6)                                            



[facility] GLUCOSE (AUTOMATED)2022 00:53:27





             Test Item    Value        Reference Range Interpretation Comments

 

             POCT GLU (test code = 5383599852) 342 mg/dL           H      

      

 

             Lab Interpretation (test code = Abnormal                           

    



             56893-2)                                            



[facility] Metabolic Panel (Na, K, Cl, CO2, 
Glucose, BUN, Creatinine, Ca)2022 00:26:35





             Test Item    Value        Reference Range Interpretation Comments

 

             NA (test code = 161 mmol/L   135-145      HH           



             6166609823)                                         

 

             K (test code = 5.3 mmol/L   3.5-5        H            



             0572152089)                                         

 

             CL (test code = 131 mmol/L          H            



             6671654206)                                         

 

             CO2 TOTAL (test code = 14 mmol/L    23-31        L            



             6172686933)                                         

 

             AGAP (test code =              2-16                      



             5577424270)                                         

 

             BUN (test code = 40 mg/dL     7-23         H            



             1752622578)                                         

 

             GLUCOSE (test code = 363 mg/dL           H            



             3949758868)                                         

 

             CREATININE (test code = 1.31 mg/dL   0.6-1.25     H            



             6736908709)                                         

 

             CALCIUM (test code = 9.0 mg/dL    8.6-10.6                  



             5143176134)                                         

 

             eGFR (test code =              mL/min/1.73m2              



             4151290281)                                         

 

             MAL (test code = MAL) Association of                           



                          Glomerular Filtration                           



                          Rate (GFR) and Staging                           



                          of Kidney Disease*                           



                          +---------------------                           



                          --+-------------------                           



                          --+-------------------                           



                          ------+| GFR                           



                          (mL/min/1.73 m2) ?|                           



                          With Kidney Damage ?|                           



                          ?Without Kidney                           



                          Damage+---------------                           



                          --------+-------------                           



                          --------+-------------                           



                          ------------+| ?>90 ?                           



                          ? ? ? ? ? ? ? ?|                           



                          ?Stage one ? ? ? ? ?|                           



                          ? Normal ? ? ? ? ? ? ?                           



                          ?+--------------------                           



                          ---+------------------                           



                          ---+------------------                           



                          -------+| ?60-89 ? ? ?                           



                          ? ? ? ? ?| ?Stage two                           



                          ? ? ? ? ?| ? Decreased                           



                          GFR ? ? ? ?                            



                          +---------------------                           



                          --+-------------------                           



                          --+-------------------                           



                          ------+| ?30-59 ? ? ?                           



                          ? ? ? ? ?| ?Stage                           



                          three ? ? ? ?| ? Stage                           



                          three ? ? ? ? ?                           



                          +---------------------                           



                          --+-------------------                           



                          --+-------------------                           



                          ------+| ?15-29 ? ? ?                           



                          ? ? ? ? ?| ?Stage four                           



                          ? ? ? ? | ? Stage four                           



                          ? ? ? ? ?                              



                          ?+--------------------                           



                          ---+------------------                           



                          ---+------------------                           



                          -------+| ?<15 (or                           



                          dialysis) ? ?| ?Stage                           



                          five ? ? ? ? | ? Stage                           



                          five ? ? ? ? ?                           



                          ?+--------------------                           



                          ---+------------------                           



                          ---+------------------                           



                          -------+ *Each stage                           



                          assumes the associated                           



                          GFR level has been in                           



                          effect for at least                           



                          three months. ?Stages                           



                          1 to 5, with or                           



                          without kidney                           



                          disease, indicate                           



                          chronic kidney                           



                          disease. Notes:                           



                          Determination of                           



                          stages one and two                           



                          (with eGFR                             



                          >59mL/min/1.73 m2)                           



                          requires estimation of                           



                          kidney damage for at                           



                          least three months as                           



                          defined by structural                           



                          or functional                           



                          abnormalities of the                           



                          kidney, manifested by                           



                          either:Pathological                           



                          abnormalities or                           



                          Markers of kidney                           



                          damage (including                           



                          abnormalities in the                           



                          composition of the                           



                          blood or urine or                           



                          abnormalities in                           



                          imaging tests).                           

 

             Lab Interpretation Abnormal                               



             (test code = 29638-8)                                        



[facility] GLUCOSE (AUTOMATED)2022 22:31:31





             Test Item    Value        Reference Range Interpretation Comments

 

             POCT GLU (test code = 5230697002) 349 mg/dL           H      

      

 

             Lab Interpretation (test code = Abnormal                           

    



             97218-0)                                            



[facility] GLUCOSE (AUTOMATED)2022 20:57:05





             Test Item    Value        Reference Range Interpretation Comments

 

             POCT GLU (test code = 0434152566)                     HH     

      

 

             Lab Interpretation (test code = Abnormal                           

    



             02075-3)                                            



[facility] GLUCOSE (AUTOMATED)2022 20:57:00





             Test Item    Value        Reference Range Interpretation Comments

 

             POCT GLU (test code = 8970604626)                     HH     

      

 

             Lab Interpretation (test code = Abnormal                           

    



             88914-7)                                            



[facility] GLUCOSE (AUTOMATED)2022 20:57:00





             Test Item    Value        Reference Range Interpretation Comments

 

             POCT GLU (test code = 7456659916)                     HH     

      

 

             Lab Interpretation (test code = Abnormal                           

    



             18601-8)                                            



[facility] GLUCOSE (AUTOMATED)2022 20:57:00





             Test Item    Value        Reference Range Interpretation Comments

 

             POCT GLU (test code = 4044365015)                     HH     

      

 

             Lab Interpretation (test code = Abnormal                           

    



             87653-9)                                            



[id number]:58:42





             Test Item    Value        Reference Range Interpretation Comments

 

             LACTIC ACID (test code = 4.32 mmol/L  0.5-2.2      H            



             9434233824)                                         

 

             Lab Interpretation (test code = Abnormal                           

    



             68638-9)                                            



[facility] [id number] 03:51:48





             Test Item    Value        Reference Range Interpretation Comments

 

             PHOSPHORUS (test code = 6.7 mg/dL    2.5-5        H            Slig

ht hemolysis



             3132423761)                                         

 

             Lab Interpretation (test Abnormal                               



             code = 71079-2)                                        



[facility] [id number] 03:51:48





             Test Item    Value        Reference Range Interpretation Comments

 

             MAGNESIUM (test code = 2936584800) 2.8 mg/dL    1.7-2.4      H     

       

 

             Lab Interpretation (test code = Abnormal                           

    



             36132-4)                                            



[facility] (60189[facility]2022 
02:16:52





             Test Item    Value        Reference Range Interpretation Comments

 

             NA (test code = 166 mmol/L   135-145      HH           



             2831628390)                                         

 

             K (test code = 5.2 mmol/L   3.5-5        H            



             7308990856)                                         

 

             CL (test code = 123 mmol/L          H            



             9125860395)                                         

 

             CO2 TOTAL (test code = 12 mmol/L    23-31        L            



             0221644233)                                         

 

             AGAP (test code =              2-16         H            



             6224130513)                                         

 

             BUN (test code = 35 mg/dL     7-23         H            



             4942061994)                                         

 

             GLUCOSE (test code = 941 mg/dL           HH           



             9938875135)                                         

 

             CREATININE (test code = 1.51 mg/dL   0.6-1.25     H            



             3357582917)                                         

 

             TOTAL BILI (test code = 1.1 mg/dL    0.1-1.1                   



             8368495211)                                         

 

             CALCIUM (test code = 10.7 mg/dL   8.6-10.6     H            



             9962315322)                                         

 

             T PROTEIN (test code = 8.2 g/dL     6.3-8.2                   



             6575870450)                                         

 

             ALBUMIN (test code = 4.5 g/dL     3.5-5                     



             1166010561)                                         

 

             ALK PHOS (test code = 201 U/L             H            



             5589363853)                                         

 

             ALTv (test code = 43 U/L       550                      



             1742-6)                                             

 

             AST(SGOT) (test code = 27 U/L       13-40                     



             1803245264)                                         

 

             eGFR (test code =              mL/min/1.73m2              



             2223861752)                                         

 

             MAL (test code = MAL) Association of                           



                          Glomerular Filtration                           



                          Rate (GFR) and Staging                           



                          of Kidney Disease*                           



                          +---------------------                           



                          --+-------------------                           



                          --+-------------------                           



                          ------+| GFR                           



                          (mL/min/1.73 m2) ?|                           



                          With Kidney Damage ?|                           



                          ?Without Kidney                           



                          Damage+---------------                           



                          --------+-------------                           



                          --------+-------------                           



                          ------------+| ?>90 ?                           



                          ? ? ? ? ? ? ? ?|                           



                          ?Stage one ? ? ? ? ?|                           



                          ? Normal ? ? ? ? ? ? ?                           



                          ?+--------------------                           



                          ---+------------------                           



                          ---+------------------                           



                          -------+| ?60-89 ? ? ?                           



                          ? ? ? ? ?| ?Stage two                           



                          ? ? ? ? ?| ? Decreased                           



                          GFR ? ? ? ?                            



                          +---------------------                           



                          --+-------------------                           



                          --+-------------------                           



                          ------+| ?30-59 ? ? ?                           



                          ? ? ? ? ?| ?Stage                           



                          three ? ? ? ?| ? Stage                           



                          three ? ? ? ? ?                           



                          +---------------------                           



                          --+-------------------                           



                          --+-------------------                           



                          ------+| ?15-29 ? ? ?                           



                          ? ? ? ? ?| ?Stage four                           



                          ? ? ? ? | ? Stage four                           



                          ? ? ? ? ?                              



                          ?+--------------------                           



                          ---+------------------                           



                          ---+------------------                           



                          -------+| ?<15 (or                           



                          dialysis) ? ?| ?Stage                           



                          five ? ? ? ? | ? Stage                           



                          five ? ? ? ? ?                           



                          ?+--------------------                           



                          ---+------------------                           



                          ---+------------------                           



                          -------+ *Each stage                           



                          assumes the associated                           



                          GFR level has been in                           



                          effect for at least                           



                          three months. ?Stages                           



                          1 to 5, with or                           



                          without kidney                           



                          disease, indicate                           



                          chronic kidney                           



                          disease. Notes:                           



                          Determination of                           



                          stages one and two                           



                          (with eGFR                             



                          >59mL/min/1.73 m2)                           



                          requires estimation of                           



                          kidney damage for at                           



                          least three months as                           



                          defined by structural                           



                          or functional                           



                          abnormalities of the                           



                          kidney, manifested by                           



                          either:Pathological                           



                          abnormalities or                           



                          Markers of kidney                           



                          damage (including                           



                          abnormalities in the                           



                          composition of the                           



                          blood or urine or                           



                          abnormalities in                           



                          imaging tests).                           

 

             Lab Interpretation Abnormal                               



             (test code = 05374-8)                                        



[facility]





             Test Item    Value        Reference    Interpretation Comments



                                       Range                     

 

             TROPONIN I (test 0.005 ng/mL  See_Comment                [Automated



             code = 0559575672)                                        message] 

The



                                                                 system which



                                                                 generated this



                                                                 result



                                                                 transmitted



                                                                 reference range

:



                                                                 <=0.034. The



                                                                 reference range



                                                                 was not used to



                                                                 interpret this



                                                                 result as



                                                                 normal/abnormal

.

 

             MAL (test code = Reference (Normal)                           



             MAL)         Range (defined by                           



                          the 99th percentile                           



                          reference limit): <=                           



                          0.034 ng/mL Note:                           



                          Cardiac troponin                           



                          begins to rise 3-4                           



                          hours after the                           



                          onset of ischemia.                           



                          Repeat in 4-6 hours                           



                          if the sample was                           



                          drawn within 3-4                           



                          hours of the onset                           



                          of the symptom and                           



                          found normal.                           



                          Diagnosis of                           



                          myocardial injury is                           



                          made with acute                           



                          changes in cTn                           



                          concentrations with                           



                          at least one serial                           



                          sample above the                           



                          99th percentile                           



                          upper reference                           



                          limit (URL), taken                           



                          together with the                           



                          patient's clinical                           



                          presentation. Biotin                           



                          has been reported to                           



                          cause a negative                           



                          bias, interpret                           



                          results relative to                           



                          patient's use of                           



                          biotin.                                

 

             Lab Interpretation Normal                                 



             (test code =                                        



             42134-9)                                            



[facility]
SALICYLATE&lt;10mg/ 9:06 PM [facility] 
LABORATORYTherapeutic Range: ? ?? ? ? Analgesic and Antipyretic Use ? ? ? ? 20-
100 mg/L ? ? Anti-Inflammatory Use ? ? ? ? ? ? ? ? 100-250 mg/L Toxic Range: ? ?
? ? ? ? ? ? ? ? ? ? ? ? ? Greater than 300 mg/[facility]UnMem[facility]SAL[facility]ATE20[facility]-27 02:06:11SALICYLATE&lt;10mg/ 9:06 PM 
[facility] LABORATORYTherapeutic Range: ? ?? ? ? Analgesic and
Antipyretic Use ? ? ? ?  mg/L ? ? Anti-Inflammatory Use ? ? ? ? ? ? ? ? 
100-250 mg/L Toxic Range: ? ? ? ? ? ? ? ? ? ? ? ? ? ? ? Greater than 300 mg/L
[facility] 02:05:51
ALCOHOL&lt;10mg/dL2022 9:05 PM [facility] 
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
ReportedUnMem[facility] CenterETHANOL2022-[facility]-27 02:05:51
ALCOHOL&lt;10mg/dL2022 9:05 PM [facility] 
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
ReportedUnMemorial [facility] CenterACETAMINOPHEN2022-07-27 02:03:04





             Test Item    Value        Reference Range Interpretation Comments

 

             ACETAMINOP (test code =              10-30        L            



             2461685291)                                         

 

             MAL (test code = MAL) Toxic: Greater than                          

 



                          200 ug/mL @ 4 hour                           



                          post ingestion or                           



                          greater than 50                           



                          ug/mL @ 12 hour post                           



                          ingestion                              

 

             Lab Interpretation (test Abnormal                               



             code = 10903-7)                                        



[facility] 02:03:04





             Test Item    Value        Reference Range Interpretation Comments

 

             ACETAMINOP (test code =              10-30        L            



             0627830801)                                         

 

             MAL (test code = MAL) Toxic: Greater than                          

 



                          200 ug/mL @ 4 hour                           



                          post ingestion or                           



                          greater than 50                           



                          ug/mL @ 12 hour post                           



                          ingestion                              

 

             Lab Interpretation (test Abnormal                               



             code = 58978-1)                                        



[facility]:02





             Test Item    Value        Reference Range Interpretation Comments

 

             LIPASE (test code = 6995002922) 246 U/L      0-220        H        

    

 

             Lab Interpretation (test code = Abnormal                           

    



             83745-5)                                            



[facility] WITH [id number] 01:12:41





             Test Item    Value        Reference Range Interpretation Comments

 

             WBC (test code =              See_Comment  H             [Automated



             6690-2)                                             message] The



                                                                 system which



                                                                 generated this



                                                                 result transmit

huma



                                                                 reference range

:



                                                                 4.20 - 10.70



                                                                 10*3/?L. The



                                                                 reference range



                                                                 was not used to



                                                                 interpret this



                                                                 result as



                                                                 normal/abnormal

.

 

             RBC (test code =              See_Comment  H             [Automated



             789-8)                                              message] The



                                                                 system which



                                                                 generated this



                                                                 result transmit

huma



                                                                 reference range

:



                                                                 4.26 - 5.52



                                                                 10*6/?L. The



                                                                 reference range



                                                                 was not used to



                                                                 interpret this



                                                                 result as



                                                                 normal/abnormal

.

 

             HGB (test code = 18.3 g/dL    12.2-16.4    H            



             718-7)                                              

 

             HCT (test code = 57.6 %       38.4-49.3    H            



             4544-3)                                             

 

             MCV (test code = 90.7 fL      81.7-95.6                 



             787-2)                                              

 

             MCH (test code = 28.8 pg      26.1-32.7                 



             785-6)                                              

 

             MCHC (test code = 31.8 g/dL    31.2-35                   



             786-4)                                              

 

             RDW-SD (test code = 50.8 fL      38.5-51.6                 



             21934-1)                                            

 

             RDW-CV (test code = 16.4 %       12.1-15.4    H            



             788-0)                                              

 

             PLT (test code =              See_Comment  H             [Automated



             777-3)                                              message] The



                                                                 system which



                                                                 generated this



                                                                 result transmit

huma



                                                                 reference range

:



                                                                 150 - 328 10*3/

?L.



                                                                 The reference



                                                                 range was not u

sed



                                                                 to interpret th

is



                                                                 result as



                                                                 normal/abnormal

.

 

             MPV (test code = 11.6 fL      9.8-13                    



             64934-4)                                            

 

             NRBC/100 WBC (test              See_Comment                [Automat

ed



             code = 3138706125)                                        message] 

The



                                                                 system which



                                                                 generated this



                                                                 result transmit

huma



                                                                 reference range

:



                                                                 0.0 - 10.0 /100



                                                                 WBCs. The



                                                                 reference range



                                                                 was not used to



                                                                 interpret this



                                                                 result as



                                                                 normal/abnormal

.

 

             NRBC x10^3 (test code              See_Comment                [Auto

mated



             = 7292139227)                                        message] The



                                                                 system which



                                                                 generated this



                                                                 result transmit

huma



                                                                 reference range

:



                                                                 10*3/?L. The



                                                                 reference range



                                                                 was not used to



                                                                 interpret this



                                                                 result as



                                                                 normal/abnormal

.

 

             SEG % (test code = 77 %         33-76        H            



             49829-2)                                            

 

             BAND % (test code = 9 %          0-1          H            



             47745-6)                                            

 

             LYMPH % (test code = 7 %          14-54        L            



             17940-2)                                            

 

             MONO % (test code = 7 %          0-4          H            



             26485-3)                                            

 

             ANC (test code = 15.98 10*3/uL 1.99-6.95    H            



             753-4)                                              

 

             OLENA CELLS (test code 2+           See_Comment  A             [Auto

mated



             = 7790-9)                                           message] The



                                                                 system which



                                                                 generated this



                                                                 result transmit

huma



                                                                 reference range

:



                                                                 (none). The



                                                                 reference range



                                                                 was not used to



                                                                 interpret this



                                                                 result as



                                                                 normal/abnormal

.

 

             Lab Interpretation Abnormal                               



             (test code = 42837-3)                                        



[facility] GLUCOSE (AUTOMATED)2022 01:30:22





             Test Item    Value        Reference Range Interpretation Comments

 

             POCT GLU (test code = 7709702382) 392 mg/dL           H      

      

 

             Lab Interpretation (test code = Abnormal                           

    



             77125-6)                                            



[facility] GLUCOSE(AGE &gt;30DAYS)2022 
01:30:00





             Test Item    Value        Reference Range Interpretation Comments

 

             POCT Glu (age>30days) 392 mg/dL,                      



             (test code = 3342) Singer informed                           

 

             Lab Interpretation (test Normal                                 



             code = 34706-1)                                        



[facility] GLUCOSE (AUTOMATED)2022 00:43:22





             Test Item    Value        Reference Range Interpretation Comments

 

             POCT GLU (test code = 3548240714) 430 mg/dL           H      

      

 

             Lab Interpretation (test code = Abnormal                           

    



             66132-5)                                            



[facility] GLUCOSE (AUTOMATED)2022-07-15 23:29:00





             Test Item    Value        Reference Range Interpretation Comments

 

             POCT GLU (test code = 4201584827) 493 mg/dL           HH     

      

 

             Lab Interpretation (test code = Abnormal                           

    



             42044-1)                                            



[facility] METABOLIC PANEL (NA, K, CL, CO2, 
GLUCOSE, BUN, CREATININE, CA)2022-07-15 22:56:10





             Test Item    Value        Reference Range Interpretation Comments

 

             NA (test code = 132 mmol/L   135-145      L            



             1014872174)                                         

 

             K (test code = 4.4 mmol/L   3.5-5                     



             5358297540)                                         

 

             CL (test code = 98 mmol/L                        



             0332678942)                                         

 

             CO2 TOTAL (test code = 19 mmol/L    23-31        L            



             0566271343)                                         

 

             AGAP (test code =              2-16                      



             8305211912)                                         

 

             BUN (test code = 11 mg/dL     7-23                      



             6521787624)                                         

 

             GLUCOSE (test code = 519 mg/dL           HH           



             0502191379)                                         

 

             CREATININE (test code = 0.55 mg/dL   0.6-1.25     L            



             9440651628)                                         

 

             CALCIUM (test code = 9.5 mg/dL    8.6-10.6                  



             5415931899)                                         

 

             eGFR (test code =              mL/min/1.73m2              



             5669876971)                                         

 

             MAL (test code = MAL) Association of                           



                          Glomerular Filtration                           



                          Rate (GFR) and Staging                           



                          of Kidney Disease*                           



                          +---------------------                           



                          --+-------------------                           



                          --+-------------------                           



                          ------+| GFR                           



                          (mL/min/1.73 m2) ?|                           



                          With Kidney Damage ?|                           



                          ?Without Kidney                           



                          Damage+---------------                           



                          --------+-------------                           



                          --------+-------------                           



                          ------------+| ?>90 ?                           



                          ? ? ? ? ? ? ? ?|                           



                          ?Stage one ? ? ? ? ?|                           



                          ? Normal ? ? ? ? ? ? ?                           



                          ?+--------------------                           



                          ---+------------------                           



                          ---+------------------                           



                          -------+| ?60-89 ? ? ?                           



                          ? ? ? ? ?| ?Stage two                           



                          ? ? ? ? ?| ? Decreased                           



                          GFR ? ? ? ?                            



                          +---------------------                           



                          --+-------------------                           



                          --+-------------------                           



                          ------+| ?30-59 ? ? ?                           



                          ? ? ? ? ?| ?Stage                           



                          three ? ? ? ?| ? Stage                           



                          three ? ? ? ? ?                           



                          +---------------------                           



                          --+-------------------                           



                          --+-------------------                           



                          ------+| ?15-29 ? ? ?                           



                          ? ? ? ? ?| ?Stage four                           



                          ? ? ? ? | ? Stage four                           



                          ? ? ? ? ?                              



                          ?+--------------------                           



                          ---+------------------                           



                          ---+------------------                           



                          -------+| ?<15 (or                           



                          dialysis) ? ?| ?Stage                           



                          five ? ? ? ? | ? Stage                           



                          five ? ? ? ? ?                           



                          ?+--------------------                           



                          ---+------------------                           



                          ---+------------------                           



                          -------+ *Each stage                           



                          assumes the associated                           



                          GFR level has been in                           



                          effect for at least                           



                          three months. ?Stages                           



                          1 to 5, with or                           



                          without kidney                           



                          disease, indicate                           



                          chronic kidney                           



                          disease. Notes:                           



                          Determination of                           



                          stages one and two                           



                          (with eGFR                             



                          >59mL/min/1.73 m2)                           



                          requires estimation of                           



                          kidney damage for at                           



                          least three months as                           



                          defined by structural                           



                          or functional                           



                          abnormalities of the                           



                          kidney, manifested by                           



                          either:Pathological                           



                          abnormalities or                           



                          Markers of kidney                           



                          damage (including                           



                          abnormalities in the                           



                          composition of the                           



                          blood or urine or                           



                          abnormalities in                           



                          imaging tests).                           

 

             Lab Interpretation Abnormal                               



             (test code = 40883-8)                                        



[facility] WITH [id number] 22:45:19





             Test Item    Value        Reference Range Interpretation Comments

 

             WBC (test code =              See_Comment                [Automated



             6014-2)                                             message] The sy

stem



                                                                 which generated



                                                                 this result



                                                                 transmitted



                                                                 reference range

:



                                                                 4.20 - 10.70



                                                                 10*3/?L. The



                                                                 reference range

 was



                                                                 not used to



                                                                 interpret this



                                                                 result as



                                                                 normal/abnormal

.

 

             RBC (test code =              See_Comment                [Automated



             714-8)                                              message] The sy

stem



                                                                 which generated



                                                                 this result



                                                                 transmitted



                                                                 reference range

:



                                                                 4.26 - 5.52



                                                                 10*6/?L. The



                                                                 reference range

 was



                                                                 not used to



                                                                 interpret this



                                                                 result as



                                                                 normal/abnormal

.

 

             HGB (test code = 15.7 g/dL    12.2-16.4                 



             718-7)                                              

 

             HCT (test code = 46.0 %       38.4-49.3                 



             4544-3)                                             

 

             MCV (test code = 85.0 fL      81.7-95.6                 



             787-2)                                              

 

             MCH (test code = 29.0 pg      26.1-32.7                 



             785-6)                                              

 

             MCHC (test code = 34.1 g/dL    31.2-35                   



             786-4)                                              

 

             RDW-SD (test code = 41.7 fL      38.5-51.6                 



             61285-1)                                            

 

             RDW-CV (test code = 13.6 %       12.1-15.4                 



             788-0)                                              

 

             PLT (test code =              See_Comment  H             [Automated



             267-3)                                              message] The sy

stem



                                                                 which generated



                                                                 this result



                                                                 transmitted



                                                                 reference range

:



                                                                 150 - 328 10*3/

?L.



                                                                 The reference r

zhen



                                                                 was not used to



                                                                 interpret this



                                                                 result as



                                                                 normal/abnormal

.

 

             MPV (test code = 10.6 fL      9.8-13                    



             05919-6)                                            

 

             NRBC/100 WBC (test              See_Comment                [Automat

ed



             code = [id number])                                        message] 

The system



                                                                 which generated



                                                                 this result



                                                                 transmitted



                                                                 reference range

:



                                                                 0.0 - 10.0 /100



                                                                 WBCs. The refer

ence



                                                                 range was not u

sed



                                                                 to interpret th

is



                                                                 result as



                                                                 normal/abnormal

.

 

             NRBC x10^3 (test code              See_Comment                [Auto

mated



             = [id number])                                        message] The s

ystem



                                                                 which generated



                                                                 this result



                                                                 transmitted



                                                                 reference range

:



                                                                 10*3/?L. The



                                                                 reference range

 was



                                                                 not used to



                                                                 interpret this



                                                                 result as



                                                                 normal/abnormal

.

 

             GRAN MAT (NEUT) % 66.6 %                                 



             (test code = 770-8)                                        

 

             IMM GRAN % (test code 0.40 %                                 



             = 5426161466)                                        

 

             LYMPH % (test code = 20.5 %                                 



             736-9)                                              

 

             MONO % (test code = 10.9 %                                 



             5905-5)                                             

 

             EOS % (test code = 1.5 %                                  



             713-8)                                              

 

             BASO % (test code = 0.1 %                                  



             706-2)                                              

 

             GRAN MAT x10^3(ANC) 4.88 10*3/uL 1.99-6.95                 



             (test code =                                        



             7287539385)                                         

 

             IMM GRAN x10^3 (test 0.03 10*3/uL 0-0.06                    



             code = 3959582379)                                        

 

             LYMPH x10^3 (test code 1.50 10*3/uL 1.09-3.23                 



             = 731-0)                                            

 

             MONO x10^3 (test code 0.80 10*3/uL 0.36-1.02                 



             = 742-7)                                            

 

             EOS x10^3 (test code = 0.11 10*3/uL 0.06-0.53                 



             711-2)                                              

 

             BASO x10^3 (test code              0.01-0.09                 



             = 704-7)                                            

 

             Lab Interpretation Abnormal                               



             (test code = 43859-1)                                        



[facility] WITH [id number] 11:05:43





             Test Item    Value        Reference Range Interpretation Comments

 

             WBC (test code =              See_Comment  H             [Automated



             6690-2)                                             message] The sy

stem



                                                                 which generated



                                                                 this result



                                                                 transmitted



                                                                 reference range

:



                                                                 4.20 - 10.70



                                                                 10*3/?L. The



                                                                 reference range

 was



                                                                 not used to



                                                                 interpret this



                                                                 result as



                                                                 normal/abnormal

.

 

             RBC (test code =              See_Comment                [Automated



             789-8)                                              message] The sy

stem



                                                                 which generated



                                                                 this result



                                                                 transmitted



                                                                 reference range

:



                                                                 4.26 - 5.52



                                                                 10*6/?L. The



                                                                 reference range

 was



                                                                 not used to



                                                                 interpret this



                                                                 result as



                                                                 normal/abnormal

.

 

             HGB (test code = 15.6 g/dL    12.2-16.4                 



             718-7)                                              

 

             HCT (test code = 46.3 %       38.4-49.3                 



             4544-3)                                             

 

             MCV (test code = 85.4 fL      81.7-95.6                 



             787-2)                                              

 

             MCH (test code = 28.8 pg      26.1-32.7                 



             785-6)                                              

 

             MCHC (test code = 33.7 g/dL    31.2-35.0                 



             786-4)                                              

 

             RDW-SD (test code = 41.7 fL      38.5-51.6                 



             27370-3)                                            

 

             RDW-CV (test code = 13.4 %       12.1-15.4                 



             788-0)                                              

 

             PLT (test code =              See_Comment  H             [Automated



             777-3)                                              message] The sy

stem



                                                                 which generated



                                                                 this result



                                                                 transmitted



                                                                 reference range

:



                                                                 150 - 328 10*3/

?L.



                                                                 The reference r

zhen



                                                                 was not used to



                                                                 interpret this



                                                                 result as



                                                                 normal/abnormal

.

 

             MPV (test code = 10.3 fL      9.8-13.0                  



             10317-1)                                            

 

             NRBC/100 WBC (test              See_Comment                [Automat

ed



             code = 8299914623)                                        message] 

The system



                                                                 which generated



                                                                 this result



                                                                 transmitted



                                                                 reference range

:



                                                                 0.0 - 10.0 /100



                                                                 WBCs. The refer

ence



                                                                 range was not u

sed



                                                                 to interpret th

is



                                                                 result as



                                                                 normal/abnormal

.

 

             NRBC x10^3 (test code <0.01        See_Comment                [Auto

mated



             = [id number])                                        message] The s

ystem



                                                                 which generated



                                                                 this result



                                                                 transmitted



                                                                 reference range

:



                                                                 10*3/?L. The



                                                                 reference range

 was



                                                                 not used to



                                                                 interpret this



                                                                 result as



                                                                 normal/abnormal

.

 

             GRAN MAT (NEUT) % 71.2 %                                 



             (test code = 770-8)                                        

 

             IMM GRAN % (test code 1.30 %                                 



             = 4066324867)                                        

 

             LYMPH % (test code = 18.2 %                                 



             736-9)                                              

 

             MONO % (test code = 7.1 %                                  



             5905-5)                                             

 

             EOS % (test code = 1.9 %                                  



             713-8)                                              

 

             BASO % (test code = 0.3 %                                  



             706-2)                                              

 

             GRAN MAT x10^3(ANC) 8.37 10*3/uL 1.99-6.95    H            



             (test code =                                        



             2080284029)                                         

 

             IMM GRAN x10^3 (test 0.15 10*3/uL 0.00-0.06    H            



             code = 9943872279)                                        

 

             LYMPH x10^3 (test code 2.14 10*3/uL 1.09-3.23                 



             = 731-0)                                            

 

             MONO x10^3 (test code 0.84 10*3/uL 0.36-1.02                 



             = 742-7)                                            

 

             EOS x10^3 (test code = 0.22 10*3/uL 0.06-0.53                 



             711-2)                                              

 

             BASO x10^3 (test code 0.04 10*3/uL 0.01-0.09                 



             = 704-7)                                            

 

             Lab Interpretation Abnormal                               



             (test code = 97189-9)                                        



[facility]. METABOLIC PANEL ([id number] 
11:03:21





             Test Item    Value        Reference Range Interpretation Comments

 

             NA (test code = 133 mmol/L   135-145      L            



             1466655936)                                         

 

             K (test code = 4.3 mmol/L   3.5-5.0                   



             7404145969)                                         

 

             CL (test code = 99 mmol/L                        



             8442317751)                                         

 

             CO2 TOTAL (test code = 20 mmol/L    23-31        L            



             5897595827)                                         

 

             AGAP (test code =              2-16                      



             0804290408)                                         

 

             BUN (test code = 11 mg/dL     7-23                      



             5598748169)                                         

 

             GLUCOSE (test code = 357 mg/dL           H            



             0775652616)                                         

 

             CREATININE (test code = 0.52 mg/dL   0.60-1.25    L            



             3636224247)                                         

 

             TOTAL BILI (test code = 0.7 mg/dL    0.1-1.1                   



             6869339621)                                         

 

             CALCIUM (test code = 9.6 mg/dL    8.6-10.6                  



             1107440550)                                         

 

             T PROTEIN (test code = 7.5 g/dL     6.3-8.2                   



             8007693551)                                         

 

             ALBUMIN (test code = 4.2 g/dL     3.5-5.0                   



             7819234557)                                         

 

             ALK PHOS (test code = 185 U/L             H            



             3672891669)                                         

 

             ALTv (test code = 78 U/L       5-50         H            



             2-6)                                             

 

             AST(SGOT) (test code = 18 U/L       13-40                     



             3277739994)                                         

 

             eGFR (test code =              mL/min/1.73m2              



             2064741542)                                         

 

             MAL (test code = MAL) Association of                           



                          Glomerular Filtration                           



                          Rate (GFR) and Staging                           



                          of Kidney Disease*                           



                          +---------------------                           



                          --+-------------------                           



                          --+-------------------                           



                          ------+| GFR                           



                          (mL/min/1.73 m2) ?|                           



                          With Kidney Damage ?|                           



                          ?Without Kidney                           



                          Damage+---------------                           



                          --------+-------------                           



                          --------+-------------                           



                          ------------+| ?>90 ?                           



                          ? ? ? ? ? ? ? ?|                           



                          ?Stage one ? ? ? ? ?|                           



                          ? Normal ? ? ? ? ? ? ?                           



                          ?+--------------------                           



                          ---+------------------                           



                          ---+------------------                           



                          -------+| ?60-89 ? ? ?                           



                          ? ? ? ? ?| ?Stage two                           



                          ? ? ? ? ?| ? Decreased                           



                          GFR ? ? ? ?                            



                          +---------------------                           



                          --+-------------------                           



                          --+-------------------                           



                          ------+| ?30-59 ? ? ?                           



                          ? ? ? ? ?| ?Stage                           



                          three ? ? ? ?| ? Stage                           



                          three ? ? ? ? ?                           



                          +---------------------                           



                          --+-------------------                           



                          --+-------------------                           



                          ------+| ?15-29 ? ? ?                           



                          ? ? ? ? ?| ?Stage four                           



                          ? ? ? ? | ? Stage four                           



                          ? ? ? ? ?                              



                          ?+--------------------                           



                          ---+------------------                           



                          ---+------------------                           



                          -------+| ?<15 (or                           



                          dialysis) ? ?| ?Stage                           



                          five ? ? ? ? | ? Stage                           



                          five ? ? ? ? ?                           



                          ?+--------------------                           



                          ---+------------------                           



                          ---+------------------                           



                          -------+ *Each stage                           



                          assumes the associated                           



                          GFR level has been in                           



                          effect for at least                           



                          three months. ?Stages                           



                          1 to 5, with or                           



                          without kidney                           



                          disease, indicate                           



                          chronic kidney                           



                          disease. Notes:                           



                          Determination of                           



                          stages one and two                           



                          (with eGFR                             



                          >59mL/min/1.73 m2)                           



                          requires estimation of                           



                          kidney damage for at                           



                          least three months as                           



                          defined by structural                           



                          or functional                           



                          abnormalities of the                           



                          kidney, manifested by                           



                          either:Pathological                           



                          abnormalities or                           



                          Markers of kidney                           



                          damage (including                           



                          abnormalities in the                           



                          composition of the                           



                          blood or urine or                           



                          abnormalities in                           



                          imaging tests).                           

 

             Lab Interpretation Abnormal                               



             (test code = 31678-0)                                        



[facility]





             Test Item    Value        Reference Range Interpretation Comments

 

             LIPASE (test code = 7597384762) 63 U/L       0-220                 

    

 

             Lab Interpretation (test code = Normal                             

    



             43234-5)                                            



[facility] 22[facility]54:04





             Test Item    Value        Reference Range Interpretation Comments

 

             POCT GLU (test code = 0669222872) 361 mg/dL           H      

      

 

             Lab Interpretation (test code = Abnormal                           

    



             71108-0)                                            



[facility] GLUCOSE (AUTOMATED)2022 12:19:07





             Test Item    Value        Reference Range Interpretation Comments

 

             POCT GLU (test code = 4262417468) 390 mg/dL           H      

      

 

             Lab Interpretation (test code = Abnormal                           

    



             85658-1)                                            



[facility] GLUCOSE (AUTOMATED)2022 04:48:35





             Test Item    Value        Reference Range Interpretation Comments

 

             POCT GLU (test code = 9014812371) 412 mg/dL           H      

      

 

             Lab Interpretation (test code = Abnormal                           

    



             41015-5)                                            



[facility] GLUCOSE (AUTOMATED)2022 01:44:33





             Test Item    Value        Reference Range Interpretation Comments

 

             POCT GLU (test code = 9386213647) 376 mg/dL           H      

      

 

             Lab Interpretation (test code = Abnormal                           

    



             95763-0)                                            



[facility] GLUCOSE (AUTOMATED)2022 21:51:37





             Test Item    Value        Reference Range Interpretation Comments

 

             POCT GLU (test code = 2151813450) 267 mg/dL           H      

      

 

             Lab Interpretation (test code = Abnormal                           

    



             30063-9)                                            



[facility] GLUCOSE (AUTOMATED)2022 17:05:21





             Test Item    Value        Reference Range Interpretation Comments

 

             POCT GLU (test code = 4738989735) 377 mg/dL           H      

      

 

             Lab Interpretation (test code = Abnormal                           

    



             25672-5)                                            



[facility] GLUCOSE (AUTOMATED)2022 13:10:54





             Test Item    Value        Reference Range Interpretation Comments

 

             POCT GLU (test code = 0291090024) 495 mg/dL           HH     

      

 

             Lab Interpretation (test code = Abnormal                           

    



             75017-7)                                            



[facility] GLUCOSE (AUTOMATED)2022 08:34:04





             Test Item    Value        Reference Range Interpretation Comments

 

             POCT GLU (test code = 5630626961) 427 mg/dL           H      

      

 

             Lab Interpretation (test code = Abnormal                           

    



             93880-5)                                            



[facility] GLUCOSE (AUTOMATED)2022 05:46:02





             Test Item    Value        Reference Range Interpretation Comments

 

             POCT GLU (test code = 2001060879) 451 mg/dL           HH     

      

 

             Lab Interpretation (test code = Abnormal                           

    



             62667-2)                                            



[facility] GLUCOSE (AUTOMATED)2022 02:44:38





             Test Item    Value        Reference Range Interpretation Comments

 

             POCT GLU (test code = 8321201803) 429 mg/dL           H      

      

 

             Lab Interpretation (test code = Abnormal                           

    



             03667-0)                                            



[facility] GLUCOSE (AUTOMATED)2022 22:38:24





             Test Item    Value        Reference Range Interpretation Comments

 

             POCT GLU (test code = 7942506955) 404 mg/dL           H      

      

 

             Lab Interpretation (test code = Abnormal                           

    



             96979-0)                                            



[facility] GLUCOSE (AUTOMATED)2022 18:05:20





             Test Item    Value        Reference Range Interpretation Comments

 

             POCT GLU (test code = 1171727759) 423 mg/dL           H      

      

 

             Lab Interpretation (test code = Abnormal                           

    



             95802-1)                                            



[facility] METABOLIC PANEL (NA, K, CL, CO2, 
GLUCOSE, BUN, CREATININE, CA)2022 14:56:25





             Test Item    Value        Reference Range Interpretation Comments

 

             NA (test code = 135 mmol/L   135-145                   



             4240692120)                                         

 

             K (test code = 4.0 mmol/L   3.5-5.0                   



             2585507052)                                         

 

             CL (test code = 102 mmol/L                       



             7204981836)                                         

 

             CO2 TOTAL (test code = 22 mmol/L    23-31        L            



             1450573789)                                         

 

             AGAP (test code =              2-16                      



             5696804158)                                         

 

             BUN (test code = 13 mg/dL     7-23                      



             1952144843)                                         

 

             GLUCOSE (test code = 547 mg/dL           HH           



             2534758518)                                         

 

             CREATININE (test code = 0.65 mg/dL   0.60-1.25                 



             4802737703)                                         

 

             CALCIUM (test code = 8.6 mg/dL    8.6-10.6                  



             9995993086)                                         

 

             eGFR (test code =              mL/min/1.73m2              



             4415939767)                                         

 

             MAL (test code = MAL) Association of                           



                          Glomerular Filtration                           



                          Rate (GFR) and Staging                           



                          of Kidney Disease*                           



                          +---------------------                           



                          --+-------------------                           



                          --+-------------------                           



                          ------+| GFR                           



                          (mL/min/1.73 m2) ?|                           



                          With Kidney Damage ?|                           



                          ?Without Kidney                           



                          Damage+---------------                           



                          --------+-------------                           



                          --------+-------------                           



                          ------------+| ?>90 ?                           



                          ? ? ? ? ? ? ? ?|                           



                          ?Stage one ? ? ? ? ?|                           



                          ? Normal ? ? ? ? ? ? ?                           



                          ?+--------------------                           



                          ---+------------------                           



                          ---+------------------                           



                          -------+| ?60-89 ? ? ?                           



                          ? ? ? ? ?| ?Stage two                           



                          ? ? ? ? ?| ? Decreased                           



                          GFR ? ? ? ?                            



                          +---------------------                           



                          --+-------------------                           



                          --+-------------------                           



                          ------+| ?30-59 ? ? ?                           



                          ? ? ? ? ?| ?Stage                           



                          three ? ? ? ?| ? Stage                           



                          three ? ? ? ? ?                           



                          +---------------------                           



                          --+-------------------                           



                          --+-------------------                           



                          ------+| ?15-29 ? ? ?                           



                          ? ? ? ? ?| ?Stage four                           



                          ? ? ? ? | ? Stage four                           



                          ? ? ? ? ?                              



                          ?+--------------------                           



                          ---+------------------                           



                          ---+------------------                           



                          -------+| ?<15 (or                           



                          dialysis) ? ?| ?Stage                           



                          five ? ? ? ? | ? Stage                           



                          five ? ? ? ? ?                           



                          ?+--------------------                           



                          ---+------------------                           



                          ---+------------------                           



                          -------+ *Each stage                           



                          assumes the associated                           



                          GFR level has been in                           



                          effect for at least                           



                          three months. ?Stages                           



                          1 to 5, with or                           



                          without kidney                           



                          disease, indicate                           



                          chronic kidney                           



                          disease. Notes:                           



                          Determination of                           



                          stages one and two                           



                          (with eGFR                             



                          >59mL/min/1.73 m2)                           



                          requires estimation of                           



                          kidney damage for at                           



                          least three months as                           



                          defined by structural                           



                          or functional                           



                          abnormalities of the                           



                          kidney, manifested by                           



                          either:Pathological                           



                          abnormalities or                           



                          Markers of kidney                           



                          damage (including                           



                          abnormalities in the                           



                          composition of the                           



                          blood or urine or                           



                          abnormalities in                           



                          imaging tests).                           

 

             Lab Interpretation Abnormal                               



             (test code = 45880-5)                                        



[facility] GLUCOSE (AUTOMATED)2022 13:36:27





             Test Item    Value        Reference Range Interpretation Comments

 

             POCT GLU (test code = 1667191700) 526 mg/dL           HH     

      

 

             Lab Interpretation (test code = Abnormal                           

    



             14664-2)                                            



[facility] GLUCOSE (AUTOMATED)2022 12:52:44





             Test Item    Value        Reference Range Interpretation Comments

 

             POCT GLU (test code = 4863034699) >600                HH     

      

 

             Lab Interpretation (test code = Abnormal                           

    



             19235-8)                                            



[facility] GLUCOSE (AUTOMATED)2022 12:52:44





             Test Item    Value        Reference Range Interpretation Comments

 

             POCT GLU (test code = 7237420476) >600                HH     

      

 

             Lab Interpretation (test code = Abnormal                           

    



             44751-4)                                            



[facility] GLUCOSE (AUTOMATED)2022 11:02:02





             Test Item    Value        Reference Range Interpretation Comments

 

             POCT GLU (test code = 7420825352) 521 mg/dL           HH     

      

 

             Lab Interpretation (test code = Abnormal                           

    



             41735-0)                                            



[facility] GLUCOSE (AUTOMATED)2022 07:22:18





             Test Item    Value        Reference Range Interpretation Comments

 

             POCT GLU (test code = 9061660025) 534 mg/dL           HH     

      

 

             Lab Interpretation (test code = Abnormal                           

    



             20686-8)                                            



[facility] METABOLIC PANEL (NA, K, CL, CO2, 
GLUCOSE, BUN, CREATININE, CA)2022 06:34:51





             Test Item    Value        Reference Range Interpretation Comments

 

             NA (test code = 133 mmol/L   135-145      L            



             7310102156)                                         

 

             K (test code = 4.9 mmol/L   3.5-5.0                   Slight



             5353131999)                                         hemolysis

 

             CL (test code = 96 mmol/L           L            



             0441959652)                                         

 

             CO2 TOTAL (test code 21 mmol/L    23-31        L            



             = 0727392476)                                        

 

             AGAP (test code =              2-16                      



             2286220054)                                         

 

             BUN (test code = 12 mg/dL     7-23                      Slight



             8780709719)                                         hemolysis

 

             GLUCOSE (test code = 639 mg/dL           HH           



             8506280866)                                         

 

             CREATININE (test code 0.46 mg/dL   0.60-1.25    L            



             = 3289852240)                                        

 

             CALCIUM (test code = 9.7 mg/dL    8.6-10.6                  



             5624542752)                                         

 

             eGFR (test code =              mL/min/1.73m2              



             5796190399)                                         

 

             MLA (test code = MAL) Association of                           



                          Glomerular                             



                          Filtration Rate                           



                          (GFR) and Staging                           



                          of Kidney Disease*                           



                          +------------------                           



                          -----+-------------                           



                          --------+----------                           



                          ---------------+|                           



                          GFR (mL/min/1.73                           



                          m2) ?| With Kidney                           



                          Damage ?| ?Without                           



                          Kidney                                 



                          Damage+------------                           



                          -----------+-------                           



                          --------------+----                           



                          -------------------                           



                          --+| ?>90 ? ? ? ? ?                           



                          ? ? ? ?| ?Stage one                           



                          ? ? ? ? ?| ? Normal                           



                          ? ? ? ? ? ? ?                           



                          ?+-----------------                           



                          ------+------------                           



                          ---------+---------                           



                          ----------------+|                           



                          ?60-89 ? ? ? ? ? ?                           



                          ? ?| ?Stage two ? ?                           



                          ? ? ?| ? Decreased                           



                          GFR ? ? ? ?                            



                          +------------------                           



                          -----+-------------                           



                          --------+----------                           



                          ---------------+|                           



                          ?30-59 ? ? ? ? ? ?                           



                          ? ?| ?Stage three ?                           



                          ? ? ?| ? Stage                           



                          three ? ? ? ? ?                           



                          +------------------                           



                          -----+-------------                           



                          --------+----------                           



                          ---------------+|                           



                          ?15-29 ? ? ? ? ? ?                           



                          ? ?| ?Stage four ?                           



                          ? ? ? | ? Stage                           



                          four ? ? ? ? ?                           



                          ?+-----------------                           



                          ------+------------                           



                          ---------+---------                           



                          ----------------+|                           



                          ?<15 (or dialysis)                           



                          ? ?| ?Stage five ?                           



                          ? ? ? | ? Stage                           



                          five ? ? ? ? ?                           



                          ?+-----------------                           



                          ------+------------                           



                          ---------+---------                           



                          ----------------+                           



                          *Each stage assumes                           



                          the associated GFR                           



                          level has been in                           



                          effect for at least                           



                          three months.                           



                          ?Stages 1 to 5,                           



                          with or without                           



                          kidney disease,                           



                          indicate chronic                           



                          kidney disease.                           



                          Notes:                                 



                          Determination of                           



                          stages one and two                           



                          (with eGFR                             



                          >59mL/min/1.73 m2)                           



                          requires estimation                           



                          of kidney damage                           



                          for at least three                           



                          months as defined                           



                          by structural or                           



                          functional                             



                          abnormalities of                           



                          the kidney,                            



                          manifested by                           



                          either:Pathological                           



                          abnormalities or                           



                          Markers of kidney                           



                          damage (including                           



                          abnormalities in                           



                          the composition of                           



                          the blood or urine                           



                          or abnormalities in                           



                          imaging tests).                           

 

             Lab Interpretation Abnormal                               



             (test code = 02518-8)                                        



[facility]. METABOLIC PANEL ([id number])2022 
02:59:57





             Test Item    Value        Reference Range Interpretation Comments

 

             NA (test code = 126 mmol/L   135-145      L            



             4599289993)                                         

 

             K (test code = 5.2 mmol/L   3.5-5.0      H            



             2382086748)                                         

 

             CL (test code = 89 mmol/L           L            



             7715802971)                                         

 

             CO2 TOTAL (test code = 20 mmol/L    23-31        L            



             4728321070)                                         

 

             AGAP (test code =              2-16         H            



             7786611710)                                         

 

             BUN (test code = 12 mg/dL     7-23                      



             3281393574)                                         

 

             GLUCOSE (test code = 856 mg/dL           HH           



             8719665916)                                         

 

             CREATININE (test code = 0.49 mg/dL   0.60-1.25    L            



             3949121196)                                         

 

             TOTAL BILI (test code = 0.7 mg/dL    0.1-1.1                   



             1722189171)                                         

 

             CALCIUM (test code = 10.2 mg/dL   8.6-10.6                  



             0666852600)                                         

 

             T PROTEIN (test code = 8.2 g/dL     6.3-8.2                   



             6502496785)                                         

 

             ALBUMIN (test code = 4.5 g/dL     3.5-5.0                   



             6427711481)                                         

 

             ALK PHOS (test code = 186 U/L             H            



             3515076438)                                         

 

             ALTv (test code = 27 U/L       5-50                      



             1742-6)                                             

 

             AST(SGOT) (test code = 18 U/L       13-40                     



             2227131695)                                         

 

             eGFR (test code =              mL/min/1.73m2              



             8957400225)                                         

 

             MAL (test code = MAL) Association of                           



                          Glomerular Filtration                           



                          Rate (GFR) and Staging                           



                          of Kidney Disease*                           



                          +---------------------                           



                          --+-------------------                           



                          --+-------------------                           



                          ------+| GFR                           



                          (mL/min/1.73 m2) ?|                           



                          With Kidney Damage ?|                           



                          ?Without Kidney                           



                          Damage+---------------                           



                          --------+-------------                           



                          --------+-------------                           



                          ------------+| ?>90 ?                           



                          ? ? ? ? ? ? ? ?|                           



                          ?Stage one ? ? ? ? ?|                           



                          ? Normal ? ? ? ? ? ? ?                           



                          ?+--------------------                           



                          ---+------------------                           



                          ---+------------------                           



                          -------+| ?60-89 ? ? ?                           



                          ? ? ? ? ?| ?Stage two                           



                          ? ? ? ? ?| ? Decreased                           



                          GFR ? ? ? ?                            



                          +---------------------                           



                          --+-------------------                           



                          --+-------------------                           



                          ------+| ?30-59 ? ? ?                           



                          ? ? ? ? ?| ?Stage                           



                          three ? ? ? ?| ? Stage                           



                          three ? ? ? ? ?                           



                          +---------------------                           



                          --+-------------------                           



                          --+-------------------                           



                          ------+| ?15-29 ? ? ?                           



                          ? ? ? ? ?| ?Stage four                           



                          ? ? ? ? | ? Stage four                           



                          ? ? ? ? ?                              



                          ?+--------------------                           



                          ---+------------------                           



                          ---+------------------                           



                          -------+| ?<15 (or                           



                          dialysis) ? ?| ?Stage                           



                          five ? ? ? ? | ? Stage                           



                          five ? ? ? ? ?                           



                          ?+--------------------                           



                          ---+------------------                           



                          ---+------------------                           



                          -------+ *Each stage                           



                          assumes the associated                           



                          GFR level has been in                           



                          effect for at least                           



                          three months. ?Stages                           



                          1 to 5, with or                           



                          without kidney                           



                          disease, indicate                           



                          chronic kidney                           



                          disease. Notes:                           



                          Determination of                           



                          stages one and two                           



                          (with eGFR                             



                          >59mL/min/1.73 m2)                           



                          requires estimation of                           



                          kidney damage for at                           



                          least three months as                           



                          defined by structural                           



                          or functional                           



                          abnormalities of the                           



                          kidney, manifested by                           



                          either:Pathological                           



                          abnormalities or                           



                          Markers of kidney                           



                          damage (including                           



                          abnormalities in the                           



                          composition of the                           



                          blood or urine or                           



                          abnormalities in                           



                          imaging tests).                           

 

             Lab Interpretation Abnormal                               



             (test code = 64966-1)                                        



[facility]





             Test Item    Value        Reference Range Interpretation Comments

 

             LIPASE (test code = 6040756004) 146 U/L      0-220                 

    

 

             Lab Interpretation (test code = Normal                             

    



             47254-7)                                            



[id number] 02:34:07





             Test Item    Value        Reference Range Interpretation Comments

 

             WBC (test code =              See_Comment                [Automated



             9737-2)                                             message] The sy

stem



                                                                 which generated



                                                                 this result



                                                                 transmitted



                                                                 reference range

:



                                                                 4.20 - 10.70



                                                                 10*3/?L. The



                                                                 reference range

 was



                                                                 not used to



                                                                 interpret this



                                                                 result as



                                                                 normal/abnormal

.

 

             RBC (test code =              See_Comment                [Automated



             866-9)                                              message] The sy

stem



                                                                 which generated



                                                                 this result



                                                                 transmitted



                                                                 reference range

:



                                                                 4.26 - 5.52



                                                                 10*6/?L. The



                                                                 reference range

 was



                                                                 not used to



                                                                 interpret this



                                                                 result as



                                                                 normal/abnormal

.

 

             HGB (test code = 16.1 g/dL    12.2-16.4                 



             718-7)                                              

 

             HCT (test code = 47.5 %       38.4-49.3                 



             4544-3)                                             

 

             MCV (test code = 86.2 fL      81.7-95.6                 



             787-2)                                              

 

             MCH (test code = 29.2 pg      26.1-32.7                 



             785-6)                                              

 

             MCHC (test code = 33.9 g/dL    31.2-35.0                 



             786-4)                                              

 

             RDW-SD (test code = 42.0 fL      38.5-51.6                 



             78694-9)                                            

 

             RDW-CV (test code = 13.5 %       12.1-15.4                 



             788-0)                                              

 

             PLT (test code =              See_Comment  H             [Automated



             777-3)                                              message] The sy

stem



                                                                 which generated



                                                                 this result



                                                                 transmitted



                                                                 reference range

:



                                                                 150 - 328 10*3/

?L.



                                                                 The reference r

zhen



                                                                 was not used to



                                                                 interpret this



                                                                 result as



                                                                 normal/abnormal

.

 

             MPV (test code = 10.5 fL      9.8-13.0                  



             40164-3)                                            

 

             NRBC/100 WBC (test              See_Comment                [Automat

ed



             code = 5482067629)                                        message] 

The system



                                                                 which generated



                                                                 this result



                                                                 transmitted



                                                                 reference range

:



                                                                 0.0 - 10.0 /100



                                                                 WBCs. The refer

ence



                                                                 range was not u

sed



                                                                 to interpret th

is



                                                                 result as



                                                                 normal/abnormal

.

 

             NRBC x10^3 (test code <0.01        See_Comment                [Auto

mated



             = 3181719504)                                        message] The s

ystem



                                                                 which generated



                                                                 this result



                                                                 transmitted



                                                                 reference range

:



                                                                 10*3/?L. The



                                                                 reference range

 was



                                                                 not used to



                                                                 interpret this



                                                                 result as



                                                                 normal/abnormal

.

 

             GRAN MAT (NEUT) % 67.5 %                                 



             (test code = 770-8)                                        

 

             IMM GRAN % (test code 0.70 %                                 



             = 0287824040)                                        

 

             LYMPH % (test code = 21.7 %                                 



             736-9)                                              

 

             MONO % (test code = 8.4 %                                  



             5905-5)                                             

 

             EOS % (test code = 1.3 %                                  



             713-8)                                              

 

             BASO % (test code = 0.4 %                                  



             706-2)                                              

 

             GRAN MAT x10^3(ANC) 6.61 10*3/uL 1.99-6.95                 



             (test code =                                        



             3699175368)                                         

 

             IMM GRAN x10^3 (test 0.07 10*3/uL 0.00-0.06    H            



             code = 0348963867)                                        

 

             LYMPH x10^3 (test code 2.12 10*3/uL 1.09-3.23                 



             = 731-0)                                            

 

             MONO x10^3 (test code 0.82 10*3/uL 0.36-1.02                 



             = 742-7)                                            

 

             EOS x10^3 (test code = 0.13 10*3/uL 0.06-0.53                 



             711-2)                                              

 

             BASO x10^3 (test code 0.04 10*3/uL 0.01-0.09                 



             = 704-7)                                            

 

             Lab Interpretation Abnormal                               



             (test code = 26774-6)                                        



[facility] (ALB,T.PRO,BILI
T,BU/BC,ALT,AST,ALK PHOS)2022 13:57:09





             Test Item    Value        Reference Range Interpretation Comments

 

             TOTAL BILI (test code = 8155628173) 0.7 mg/dL    0.1-1.1           

        

 

             BILI UNCON (test code = 1773688145) 0.3 mg/dL    0.1-1.1           

        

 

             BILI CONJ (test code = 1680582967) 0.0 mg/dL    0.0-0.3            

       

 

             T PROTEIN (test code = 6494047405) 7.5 g/dL     6.3-8.2            

       

 

             ALBUMIN (test code = 3772814028) 3.8 g/dL     3.5-5.0              

     

 

             ALK PHOS (test code = 9364747046) 161 U/L             H      

      

 

             ALTv (test code = 1742-6) 44 U/L       5-50                      

 

             AST(SGOT) (test code = 9298367343) 47 U/L       13-40        H     

       

 

             Lab Interpretation (test code = Abnormal                           

    



             03339-1)                                            



[facility] (NA, K, CL, CO2, 
GLUCOSE, BUN, CREATININE, CA)2022 10:48:59





             Test Item    Value        Reference Range Interpretation Comments

 

             NA (test code = 136 mmol/L   135-145                   



             9716120779)                                         

 

             K (test code = 3.8 mmol/L   3.5-5.0                   



             6427807453)                                         

 

             CL (test code = 101 mmol/L                       



             9037997805)                                         

 

             CO2 TOTAL (test code = 22 mmol/L    23-31        L            



             3768687757)                                         

 

             AGAP (test code =              2-16                      



             3525523450)                                         

 

             BUN (test code = 16 mg/dL     7-23                      



             4296939697)                                         

 

             GLUCOSE (test code = 358 mg/dL           H            



             3286599654)                                         

 

             CREATININE (test code = 0.63 mg/dL   0.60-1.25                 



             2257724632)                                         

 

             CALCIUM (test code = 9.2 mg/dL    8.6-10.6                  



             2011442250)                                         

 

             eGFR (test code =              mL/min/1.73m2              



             6594803239)                                         

 

             MAL (test code = MAL) Association of                           



                          Glomerular Filtration                           



                          Rate (GFR) and Staging                           



                          of Kidney Disease*                           



                          +---------------------                           



                          --+-------------------                           



                          --+-------------------                           



                          ------+| GFR                           



                          (mL/min/1.73 m2) ?|                           



                          With Kidney Damage ?|                           



                          ?Without Kidney                           



                          Damage+---------------                           



                          --------+-------------                           



                          --------+-------------                           



                          ------------+| ?>90 ?                           



                          ? ? ? ? ? ? ? ?|                           



                          ?Stage one ? ? ? ? ?|                           



                          ? Normal ? ? ? ? ? ? ?                           



                          ?+--------------------                           



                          ---+------------------                           



                          ---+------------------                           



                          -------+| ?60-89 ? ? ?                           



                          ? ? ? ? ?| ?Stage two                           



                          ? ? ? ? ?| ? Decreased                           



                          GFR ? ? ? ?                            



                          +---------------------                           



                          --+-------------------                           



                          --+-------------------                           



                          ------+| ?30-59 ? ? ?                           



                          ? ? ? ? ?| ?Stage                           



                          three ? ? ? ?| ? Stage                           



                          three ? ? ? ? ?                           



                          +---------------------                           



                          --+-------------------                           



                          --+-------------------                           



                          ------+| ?15-29 ? ? ?                           



                          ? ? ? ? ?| ?Stage four                           



                          ? ? ? ? | ? Stage four                           



                          ? ? ? ? ?                              



                          ?+--------------------                           



                          ---+------------------                           



                          ---+------------------                           



                          -------+| ?<15 (or                           



                          dialysis) ? ?| ?Stage                           



                          five ? ? ? ? | ? Stage                           



                          five ? ? ? ? ?                           



                          ?+--------------------                           



                          ---+------------------                           



                          ---+------------------                           



                          -------+ *Each stage                           



                          assumes the associated                           



                          GFR level has been in                           



                          effect for at least                           



                          three months. ?Stages                           



                          1 to 5, with or                           



                          without kidney                           



                          disease, indicate                           



                          chronic kidney                           



                          disease. Notes:                           



                          Determination of                           



                          stages one and two                           



                          (with eGFR                             



                          >59mL/min/1.73 m2)                           



                          requires estimation of                           



                          kidney damage for at                           



                          least three months as                           



                          defined by structural                           



                          or functional                           



                          abnormalities of the                           



                          kidney, manifested by                           



                          either:Pathological                           



                          abnormalities or                           



                          Markers of kidney                           



                          damage (including                           



                          abnormalities in the                           



                          composition of the                           



                          blood or urine or                           



                          abnormalities in                           



                          imaging tests).                           

 

             Lab Interpretation Abnormal                               



             (test code = 40455-7)                                        



[facility] WITH [id number] 10:01:35





             Test Item    Value        Reference Range Interpretation Comments

 

             WBC (test code =              See_Comment                [Automated



             5390-2)                                             message] The sy

stem



                                                                 which generated



                                                                 this result



                                                                 transmitted



                                                                 reference range

:



                                                                 4.20 - 10.70



                                                                 10*3/?L. The



                                                                 reference range

 was



                                                                 not used to



                                                                 interpret this



                                                                 result as



                                                                 normal/abnormal

.

 

             RBC (test code =              See_Comment                [Automated



             789-8)                                              message] The sy

stem



                                                                 which generated



                                                                 this result



                                                                 transmitted



                                                                 reference range

:



                                                                 4.26 - 5.52



                                                                 10*6/?L. The



                                                                 reference range

 was



                                                                 not used to



                                                                 interpret this



                                                                 result as



                                                                 normal/abnormal

.

 

             HGB (test code = 14.9 g/dL    12.2-16.4                 



             718-7)                                              

 

             HCT (test code = 43.2 %       38.4-49.3                 



             4544-3)                                             

 

             MCV (test code = 86.1 fL      81.7-95.6                 



             787-2)                                              

 

             MCH (test code = 29.7 pg      26.1-32.7                 



             785-6)                                              

 

             MCHC (test code = 34.5 g/dL    31.2-35.0                 



             786-4)                                              

 

             RDW-SD (test code = 41.6 fL      38.5-51.6                 



             50395-3)                                            

 

             RDW-CV (test code = 13.4 %       12.1-15.4                 



             788-0)                                              

 

             PLT (test code =              See_Comment  H             [Automated



             777-3)                                              message] The sy

stem



                                                                 which generated



                                                                 this result



                                                                 transmitted



                                                                 reference range

:



                                                                 150 - 328 10*3/

?L.



                                                                 The reference r

zhen



                                                                 was not used to



                                                                 interpret this



                                                                 result as



                                                                 normal/abnormal

.

 

             MPV (test code = 11.0 fL      9.8-13.0                  



             65699-6)                                            

 

             NRBC/100 WBC (test              See_Comment                [Automat

ed



             code = 4198127540)                                        message] 

The system



                                                                 which generated



                                                                 this result



                                                                 transmitted



                                                                 reference range

:



                                                                 0.0 - 10.0 /100



                                                                 WBCs. The refer

ence



                                                                 range was not u

sed



                                                                 to interpret th

is



                                                                 result as



                                                                 normal/abnormal

.

 

             NRBC x10^3 (test code <0.01        See_Comment                [Auto

mated



             = [id number])                                        message] The s

ystem



                                                                 which generated



                                                                 this result



                                                                 transmitted



                                                                 reference range

:



                                                                 10*3/?L. The



                                                                 reference range

 was



                                                                 not used to



                                                                 interpret this



                                                                 result as



                                                                 normal/abnormal

.

 

             GRAN MAT (NEUT) % 62.6 %                                 



             (test code = 770-8)                                        

 

             IMM GRAN % (test code 0.40 %                                 



             = 0003894828)                                        

 

             LYMPH % (test code = 23.2 %                                 



             736-9)                                              

 

             MONO % (test code = 9.6 %                                  



             5905-5)                                             

 

             EOS % (test code = 3.8 %                                  



             713-8)                                              

 

             BASO % (test code = 0.4 %                                  



             706-2)                                              

 

             GRAN MAT x10^3(ANC) 4.76 10*3/uL 1.99-6.95                 



             (test code =                                        



             1588848661)                                         

 

             IMM GRAN x10^3 (test 0.03 10*3/uL 0.00-0.06                 



             code = 9053060486)                                        

 

             LYMPH x10^3 (test code 1.76 10*3/uL 1.09-3.23                 



             = 731-0)                                            

 

             MONO x10^3 (test code 0.73 10*3/uL 0.36-1.02                 



             = 742-7)                                            

 

             EOS x10^3 (test code = 0.29 10*3/uL 0.06-0.53                 



             711-2)                                              

 

             BASO x10^3 (test code 0.03 10*3/uL 0.01-0.09                 



             = 704-7)                                            

 

             Lab Interpretation Abnormal                               



             (test code = 46173-9)                                        



[id number] 
[facility]:59





             Test Item    Value        Reference Range Interpretation Comments

 

             NA (test code = 138 mmol/L   135-145                   



             8601772982)                                         

 

             K (test code = 5.4 mmol/L   3.5-5.0      H            



             9901632944)                                         

 

             CL (test code = 98 mmol/L                        



             7754990426)                                         

 

             CO2 TOTAL (test code = 17 mmol/L    23-31        L            



             6511044226)                                         

 

             AGAP (test code =              2-16         H            



             0903727970)                                         

 

             BUN (test code = 19 mg/dL     7-23                      



             6565989153)                                         

 

             GLUCOSE (test code = 469 mg/dL           HH           



             5450677113)                                         

 

             CREATININE (test code = 0.74 mg/dL   0.60-1.25                 



             9713877273)                                         

 

             TOTAL BILI (test code = 1.0 mg/dL    0.1-1.1                   



             8672699466)                                         

 

             CALCIUM (test code = 10.1 mg/dL   8.6-10.6                  



             4299404166)                                         

 

             T PROTEIN (test code = 8.2 g/dL     6.3-8.2                   



             8922505942)                                         

 

             ALBUMIN (test code = 4.6 g/dL     3.5-5.0                   



             7366989918)                                         

 

             ALK PHOS (test code = 208 U/L             H            



             5126444127)                                         

 

             ALTv (test code = 51 U/L       5-50         H            



             2-6)                                             

 

             AST(SGOT) (test code = 18 U/L       13-40                     



             6480213102)                                         

 

             eGFR (test code =              mL/min/1.73m2              



             9265802123)                                         

 

             MAL (test code = MAL) Association of                           



                          Glomerular Filtration                           



                          Rate (GFR) and Staging                           



                          of Kidney Disease*                           



                          +---------------------                           



                          --+-------------------                           



                          --+-------------------                           



                          ------+| GFR                           



                          (mL/min/1.73 m2) ?|                           



                          With Kidney Damage ?|                           



                          ?Without Kidney                           



                          Damage+---------------                           



                          --------+-------------                           



                          --------+-------------                           



                          ------------+| ?>90 ?                           



                          ? ? ? ? ? ? ? ?|                           



                          ?Stage one ? ? ? ? ?|                           



                          ? Normal ? ? ? ? ? ? ?                           



                          ?+--------------------                           



                          ---+------------------                           



                          ---+------------------                           



                          -------+| ?60-89 ? ? ?                           



                          ? ? ? ? ?| ?Stage two                           



                          ? ? ? ? ?| ? Decreased                           



                          GFR ? ? ? ?                            



                          +---------------------                           



                          --+-------------------                           



                          --+-------------------                           



                          ------+| ?30-59 ? ? ?                           



                          ? ? ? ? ?| ?Stage                           



                          three ? ? ? ?| ? Stage                           



                          three ? ? ? ? ?                           



                          +---------------------                           



                          --+-------------------                           



                          --+-------------------                           



                          ------+| ?15-29 ? ? ?                           



                          ? ? ? ? ?| ?Stage four                           



                          ? ? ? ? | ? Stage four                           



                          ? ? ? ? ?                              



                          ?+--------------------                           



                          ---+------------------                           



                          ---+------------------                           



                          -------+| ?<15 (or                           



                          dialysis) ? ?| ?Stage                           



                          five ? ? ? ? | ? Stage                           



                          five ? ? ? ? ?                           



                          ?+--------------------                           



                          ---+------------------                           



                          ---+------------------                           



                          -------+ *Each stage                           



                          assumes the associated                           



                          GFR level has been in                           



                          effect for at least                           



                          three months. ?Stages                           



                          1 to 5, with or                           



                          without kidney                           



                          disease, indicate                           



                          chronic kidney                           



                          disease. Notes:                           



                          Determination of                           



                          stages one and two                           



                          (with eGFR                             



                          >59mL/min/1.73 m2)                           



                          requires estimation of                           



                          kidney damage for at                           



                          least three months as                           



                          defined by structural                           



                          or functional                           



                          abnormalities of the                           



                          kidney, manifested by                           



                          either:Pathological                           



                          abnormalities or                           



                          Markers of kidney                           



                          damage (including                           



                          abnormalities in the                           



                          composition of the                           



                          blood or urine or                           



                          abnormalities in                           



                          imaging tests).                           

 

             Lab Interpretation Abnormal                               



             (test code = 78655-7)                                        



[facility]





             Test Item    Value        Reference Range Interpretation Comments

 

             LIPASE (test code = 8300011476) 137 U/L      0-220                 

    

 

             Lab Interpretation (test code = Normal                             

    



             30529-1)                                            



[facility] [id number] 04:07:59





             Test Item    Value        Reference Range Interpretation Comments

 

             WBC (test code =              See_Comment  H             [Automated



             6690-2)                                             message] The sy

stem



                                                                 which generated



                                                                 this result



                                                                 transmitted



                                                                 reference range

:



                                                                 4.20 - 10.70



                                                                 10*3/?L. The



                                                                 reference range

 was



                                                                 not used to



                                                                 interpret this



                                                                 result as



                                                                 normal/abnormal

.

 

             RBC (test code =              See_Comment  H             [Automated



             789-8)                                              message] The sy

stem



                                                                 which generated



                                                                 this result



                                                                 transmitted



                                                                 reference range

:



                                                                 4.26 - 5.52



                                                                 10*6/?L. The



                                                                 reference range

 was



                                                                 not used to



                                                                 interpret this



                                                                 result as



                                                                 normal/abnormal

.

 

             HGB (test code = 16.8 g/dL    12.2-16.4    H            



             718-7)                                              

 

             HCT (test code = 49.2 %       38.4-49.3                 



             4544-3)                                             

 

             MCV (test code = 86.2 fL      81.7-95.6                 



             787-2)                                              

 

             MCH (test code = 29.4 pg      26.1-32.7                 



             785-6)                                              

 

             MCHC (test code = 34.1 g/dL    31.2-35.0                 



             786-4)                                              

 

             RDW-SD (test code = 42.6 fL      38.5-51.6                 



             48423-3)                                            

 

             RDW-CV (test code = 13.6 %       12.1-15.4                 



             788-0)                                              

 

             PLT (test code =              See_Comment  H             [Automated



             777-3)                                              message] The sy

stem



                                                                 which generated



                                                                 this result



                                                                 transmitted



                                                                 reference range

:



                                                                 150 - 328 10*3/

?L.



                                                                 The reference r

zhen



                                                                 was not used to



                                                                 interpret this



                                                                 result as



                                                                 normal/abnormal

.

 

             MPV (test code = 10.7 fL      9.8-13.0                  



             40885-3)                                            

 

             NRBC/100 WBC (test              See_Comment                [Automat

ed



             code = 2732677025)                                        message] 

The system



                                                                 which generated



                                                                 this result



                                                                 transmitted



                                                                 reference range

:



                                                                 0.0 - 10.0 /100



                                                                 WBCs. The refer

ence



                                                                 range was not u

sed



                                                                 to interpret th

is



                                                                 result as



                                                                 normal/abnormal

.

 

             NRBC x10^3 (test code <0.01        See_Comment                [Auto

mated



             = 6624316046)                                        message] The s

ystem



                                                                 which generated



                                                                 this result



                                                                 transmitted



                                                                 reference range

:



                                                                 10*3/?L. The



                                                                 reference range

 was



                                                                 not used to



                                                                 interpret this



                                                                 result as



                                                                 normal/abnormal

.

 

             GRAN MAT (NEUT) % 74.2 %                                 



             (test code = 770-8)                                        

 

             IMM GRAN % (test code 0.50 %                                 



             = 1121890283)                                        

 

             LYMPH % (test code = 14.8 %                                 



             736-9)                                              

 

             MONO % (test code = 8.3 %                                  



             5905-5)                                             

 

             EOS % (test code = 1.9 %                                  



             713-8)                                              

 

             BASO % (test code = 0.3 %                                  



             706-2)                                              

 

             GRAN MAT x10^3(ANC) 8.01 10*3/uL 1.99-6.95    H            



             (test code =                                        



             7825240857)                                         

 

             IMM GRAN x10^3 (test 0.05 10*3/uL 0.00-0.06                 



             code = 1088935743)                                        

 

             LYMPH x10^3 (test code 1.59 10*3/uL 1.09-3.23                 



             = 731-0)                                            

 

             MONO x10^3 (test code 0.89 10*3/uL 0.36-1.02                 



             = 742-7)                                            

 

             EOS x10^3 (test code = 0.20 10*3/uL 0.06-0.53                 



             711-2)                                              

 

             BASO x10^3 (test code 0.03 10*3/uL 0.01-0.09                 



             = 704-7)                                            

 

             Lab Interpretation Abnormal                               



             (test code = 70024-9)                                        



[facility] GLUCOSE (AUTOMATED)2022-06-10 17:25:00





             Test Item    Value        Reference Range Interpretation Comments

 

             POCT GLU (test code = 6569425215) 249 mg/dL           H      

      

 

             Lab Interpretation (test code = Abnormal                           

    



             57807-7)                                            



[facility] GLUCOSE (AUTOMATED)2022-06-10 12:54:01





             Test Item    Value        Reference Range Interpretation Comments

 

             POCT GLU (test code = 2506490618) 188 mg/dL           H      

      

 

             Lab Interpretation (test code = Abnormal                           

    



             47537-9)                                            



[facility] METABOLIC PANEL (NA, K, CL, CO2, 
GLUCOSE, BUN, CREATININE, CA)2022-06-10 11:48:23





             Test Item    Value        Reference Range Interpretation Comments

 

             NA (test code = 137 mmol/L   135-145                   



             1284016488)                                         

 

             K (test code = 4.3 mmol/L   3.5-5.0                   



             4895417872)                                         

 

             CL (test code = 105 mmol/L                       



             3828158138)                                         

 

             CO2 TOTAL (test code = 23 mmol/L    23-31                     



             5430011698)                                         

 

             AGAP (test code =              2-16                      



             7173189809)                                         

 

             BUN (test code = 19 mg/dL     7-23                      



             7158610406)                                         

 

             GLUCOSE (test code = 243 mg/dL           H            



             2299993917)                                         

 

             CREATININE (test code = 0.50 mg/dL   0.60-1.25    L            



             5175758160)                                         

 

             CALCIUM (test code = 8.9 mg/dL    8.6-10.6                  



             4679420779)                                         

 

             eGFR (test code =              mL/min/1.73m2              



             1186423795)                                         

 

             MAL (test code = MAL) Association of                           



                          Glomerular Filtration                           



                          Rate (GFR) and Staging                           



                          of Kidney Disease*                           



                          +---------------------                           



                          --+-------------------                           



                          --+-------------------                           



                          ------+| GFR                           



                          (mL/min/1.73 m2) ?|                           



                          With Kidney Damage ?|                           



                          ?Without Kidney                           



                          Damage+---------------                           



                          --------+-------------                           



                          --------+-------------                           



                          ------------+| ?>90 ?                           



                          ? ? ? ? ? ? ? ?|                           



                          ?Stage one ? ? ? ? ?|                           



                          ? Normal ? ? ? ? ? ? ?                           



                          ?+--------------------                           



                          ---+------------------                           



                          ---+------------------                           



                          -------+| ?60-89 ? ? ?                           



                          ? ? ? ? ?| ?Stage two                           



                          ? ? ? ? ?| ? Decreased                           



                          GFR ? ? ? ?                            



                          +---------------------                           



                          --+-------------------                           



                          --+-------------------                           



                          ------+| ?30-59 ? ? ?                           



                          ? ? ? ? ?| ?Stage                           



                          three ? ? ? ?| ? Stage                           



                          three ? ? ? ? ?                           



                          +---------------------                           



                          --+-------------------                           



                          --+-------------------                           



                          ------+| ?15-29 ? ? ?                           



                          ? ? ? ? ?| ?Stage four                           



                          ? ? ? ? | ? Stage four                           



                          ? ? ? ? ?                              



                          ?+--------------------                           



                          ---+------------------                           



                          ---+------------------                           



                          -------+| ?<15 (or                           



                          dialysis) ? ?| ?Stage                           



                          five ? ? ? ? | ? Stage                           



                          five ? ? ? ? ?                           



                          ?+--------------------                           



                          ---+------------------                           



                          ---+------------------                           



                          -------+ *Each stage                           



                          assumes the associated                           



                          GFR level has been in                           



                          effect for at least                           



                          three months. ?Stages                           



                          1 to 5, with or                           



                          without kidney                           



                          disease, indicate                           



                          chronic kidney                           



                          disease. Notes:                           



                          Determination of                           



                          stages one and two                           



                          (with eGFR                             



                          >59mL/min/1.73 m2)                           



                          requires estimation of                           



                          kidney damage for at                           



                          least three months as                           



                          defined by structural                           



                          or functional                           



                          abnormalities of the                           



                          kidney, manifested by                           



                          either:Pathological                           



                          abnormalities or                           



                          Markers of kidney                           



                          damage (including                           



                          abnormalities in the                           



                          composition of the                           



                          blood or urine or                           



                          abnormalities in                           



                          imaging tests).                           

 

             Lab Interpretation Abnormal                               



             (test code = 29541-8)                                        



[facility]





             Test Item    Value        Reference Range Interpretation Comments

 

             MAGNESIUM (test code = 8647206102) 2.1 mg/dL    1.7-2.4            

       

 

             Lab Interpretation (test code = Normal                             

    



             68684-1)                                            



[facility] WITH DIFF2022-06-10 11:09:44





             Test Item    Value        Reference Range Interpretation Comments

 

             WBC (test code =              See_Comment                [Automated

 message]



             6690-2)                                             The system [employer]



                                                                 generated this 

result



                                                                 transmitted ref

erence



                                                                 range: 4.20 - 1

0.70



                                                                 10*3/?L. The re

ference



                                                                 range was not u

sed to



                                                                 interpret this 

result



                                                                 as normal/abnor

mal.

 

             RBC (test code =              See_Comment                [Automated

 message]



             789-8)                                              The system [employer]



                                                                 generated this 

result



                                                                 transmitted ref

erence



                                                                 range: 4.26 - 5

.52



                                                                 10*6/?L. The re

ference



                                                                 range was not u

sed to



                                                                 interpret this 

result



                                                                 as normal/abnor

mal.

 

             HGB (test code = 15.9 g/dL    12.2-16.4                 



             718-7)                                              

 

             HCT (test code = 46.8 %       38.4-49.3                 



             4544-3)                                             

 

             MCV (test code = 87.5 fL      81.7-95.6                 



             787-2)                                              

 

             MCH (test code = 29.7 pg      26.1-32.7                 



             785-6)                                              

 

             MCHC (test code = 34.0 g/dL    31.2-35.0                 



             786-4)                                              

 

             RDW-SD (test code 43.6 fL      38.5-51.6                 



             = 24250-3)                                          

 

             RDW-CV (test code 13.7 %       12.1-15.4                 



             = 788-0)                                            

 

             PLT (test code =              See_Comment                [Automated

 message]



             777-3)                                              The system [employer]



                                                                 generated this 

result



                                                                 transmitted ref

erence



                                                                 range: 150 - 32

8



                                                                 10*3/?L. The re

ference



                                                                 range was not u

sed to



                                                                 interpret this 

result



                                                                 as normal/abnor

mal.

 

             MPV (test code = 11.0 fL      9.8-13.0                  



             78743-6)                                            

 

             NRBC/100 WBC (test              See_Comment                [Automat

ed message]



             code = 7081014569)                                        The syste

[employer] which



                                                                 generated this 

result



                                                                 transmitted ref

erence



                                                                 range: 0.0 - 10

.0 /100



                                                                 WBCs. The refer

ence



                                                                 range was not u

sed to



                                                                 interpret this 

result



                                                                 as normal/abnor

mal.

 

             NRBC x10^3 (test <0.01        See_Comment                [Automated

 message]



             code = 8630926382)                                        The syste

m which



                                                                 generated this 

result



                                                                 transmitted ref

erence



                                                                 range: 10*3/?L.

 The



                                                                 reference range

 was not



                                                                 used to interpr

et this



                                                                 result as



                                                                 normal/abnormal

.

 

             GRAN MAT (NEUT) % 57.9 %                                 



             (test code =                                        



             770-8)                                              

 

             IMM GRAN % (test 0.60 %                                 



             code = 9604144608)                                        

 

             LYMPH % (test code 30.7 %                                 



             = 736-9)                                            

 

             MONO % (test code 8.5 %                                  



             = 5905-5)                                           

 

             EOS % (test code = 2.0 %                                  



             713-8)                                              

 

             BASO % (test code 0.3 %                                  



             = 706-2)                                            

 

             GRAN MAT     5.14 10*3/uL 1.99-6.95                 



             x10^3(ANC) (test                                        



             code = 1034717173)                                        

 

             IMM GRAN x10^3 0.05 10*3/uL 0.00-0.06                 



             (test code =                                        



             0128347160)                                         

 

             LYMPH x10^3 (test 2.73 10*3/uL 1.09-3.23                 



             code = 731-0)                                        

 

             MONO x10^3 (test 0.76 10*3/uL 0.36-1.02                 



             code = 742-7)                                        

 

             EOS x10^3 (test 0.18 10*3/uL 0.06-0.53                 



             code = 711-2)                                        

 

             BASO x10^3 (test 0.03 10*3/uL 0.01-0.09                 



             code = 704-7)                                        



[facility] GLUCOSE (AUTOMATED)2022-06-10 10:34:14





             Test Item    Value        Reference Range Interpretation Comments

 

             POCT GLU (test code = 6823141898) 230 mg/dL           H      

      

 

             Lab Interpretation (test code = Abnormal                           

    



             26779-3)                                            



[facility] GLUCOSE (AUTOMATED)2022-06-10 05:56:50





             Test Item    Value        Reference Range Interpretation Comments

 

             POCT GLU (test code = 4714421563) 314 mg/dL           H      

      

 

             Lab Interpretation (test code = Abnormal                           

    



             68538-2)                                            



[facility] GLUCOSE (AUTOMATED)2022-06-10 04:40:22





             Test Item    Value        Reference Range Interpretation Comments

 

             POCT GLU (test code = 7103670946) 324 mg/dL           H      

      

 

             Lab Interpretation (test code = Abnormal                           

    



             30413-1)                                            



[facility] GLUCOSE (AUTOMATED)2022-06-10 02:37:33





             Test Item    Value        Reference Range Interpretation Comments

 

             POCT GLU (test code = 1588419007) 296 mg/dL           H      

      

 

             Lab Interpretation (test code = Abnormal                           

    



             05748-5)                                            



[facility] GLUCOSE (AUTOMATED)2022-06-10 01:05:02





             Test Item    Value        Reference Range Interpretation Comments

 

             POCT GLU (test code = 9056252251) 319 mg/dL           H      

      

 

             Lab Interpretation (test code = Abnormal                           

    



             77492-2)                                            



[facility] GLUCOSE (AUTOMATED)2022 21:59:09





             Test Item    Value        Reference Range Interpretation Comments

 

             POCT GLU (test code = 6255564968) 257 mg/dL           H      

      

 

             Lab Interpretation (test code = Abnormal                           

    



             20893-9)                                            



[facility] GLUCOSE (AUTOMATED)2022 17:21:41





             Test Item    Value        Reference Range Interpretation Comments

 

             POCT GLU (test code = 3415318787) 214 mg/dL           H      

      

 

             Lab Interpretation (test code = Abnormal                           

    



             09647-7)                                            



[facility] GLUCOSE (AUTOMATED)2022 13:04:04





             Test Item    Value        Reference Range Interpretation Comments

 

             POCT GLU (test code = 6186955720) 234 mg/dL           H      

      

 

             Lab Interpretation (test code = Abnormal                           

    



             22212-4)                                            



[facility] METABOLIC PANEL (NA, K, CL, CO2, 
GLUCOSE, BUN, CREATININE, CA)2022 12:23:33





             Test Item    Value        Reference Range Interpretation Comments

 

             NA (test code = 136 mmol/L   135-145                   



             3567944762)                                         

 

             K (test code = 4.1 mmol/L   3.5-5.0                   



             1851628815)                                         

 

             CL (test code = 109 mmol/L          H            



             3577336986)                                         

 

             CO2 TOTAL (test code = 20 mmol/L    23-31        L            



             0281902308)                                         

 

             AGAP (test code =              2-16                      



             9823112574)                                         

 

             BUN (test code = 16 mg/dL     7-23                      



             2969093250)                                         

 

             GLUCOSE (test code = 281 mg/dL           H            



             4845772626)                                         

 

             CREATININE (test code = 0.50 mg/dL   0.60-1.25    L            



             1474364280)                                         

 

             CALCIUM (test code = 8.3 mg/dL    8.6-10.6     L            



             8988186180)                                         

 

             eGFR (test code =              mL/min/1.73m2              



             3988717329)                                         

 

             MAL (test code = MAL) Association of                           



                          Glomerular Filtration                           



                          Rate (GFR) and Staging                           



                          of Kidney Disease*                           



                          +---------------------                           



                          --+-------------------                           



                          --+-------------------                           



                          ------+| GFR                           



                          (mL/min/1.73 m2) ?|                           



                          With Kidney Damage ?|                           



                          ?Without Kidney                           



                          Damage+---------------                           



                          --------+-------------                           



                          --------+-------------                           



                          ------------+| ?>90 ?                           



                          ? ? ? ? ? ? ? ?|                           



                          ?Stage one ? ? ? ? ?|                           



                          ? Normal ? ? ? ? ? ? ?                           



                          ?+--------------------                           



                          ---+------------------                           



                          ---+------------------                           



                          -------+| ?60-89 ? ? ?                           



                          ? ? ? ? ?| ?Stage two                           



                          ? ? ? ? ?| ? Decreased                           



                          GFR ? ? ? ?                            



                          +---------------------                           



                          --+-------------------                           



                          --+-------------------                           



                          ------+| ?30-59 ? ? ?                           



                          ? ? ? ? ?| ?Stage                           



                          three ? ? ? ?| ? Stage                           



                          three ? ? ? ? ?                           



                          +---------------------                           



                          --+-------------------                           



                          --+-------------------                           



                          ------+| ?15-29 ? ? ?                           



                          ? ? ? ? ?| ?Stage four                           



                          ? ? ? ? | ? Stage four                           



                          ? ? ? ? ?                              



                          ?+--------------------                           



                          ---+------------------                           



                          ---+------------------                           



                          -------+| ?<15 (or                           



                          dialysis) ? ?| ?Stage                           



                          five ? ? ? ? | ? Stage                           



                          five ? ? ? ? ?                           



                          ?+--------------------                           



                          ---+------------------                           



                          ---+------------------                           



                          -------+ *Each stage                           



                          assumes the associated                           



                          GFR level has been in                           



                          effect for at least                           



                          three months. ?Stages                           



                          1 to 5, with or                           



                          without kidney                           



                          disease, indicate                           



                          chronic kidney                           



                          disease. Notes:                           



                          Determination of                           



                          stages one and two                           



                          (with eGFR                             



                          >59mL/min/1.73 m2)                           



                          requires estimation of                           



                          kidney damage for at                           



                          least three months as                           



                          defined by structural                           



                          or functional                           



                          abnormalities of the                           



                          kidney, manifested by                           



                          either:Pathological                           



                          abnormalities or                           



                          Markers of kidney                           



                          damage (including                           



                          abnormalities in the                           



                          composition of the                           



                          blood or urine or                           



                          abnormalities in                           



                          imaging tests).                           

 

             Lab Interpretation Abnormal                               



             (test code = 26672-9)                                        



[facility]





             Test Item    Value        Reference Range Interpretation Comments

 

             MAGNESIUM (test code = 4239471823) 1.6 mg/dL    1.7-2.4      L     

       

 

             Lab Interpretation (test code = Abnormal                           

    



             93423-2)                                            



[facility]13





             Test Item    Value        Reference Range Interpretation Comments

 

             PHOSPHORUS (test code = 4092327866) 3.2 mg/dL    2.5-5.0           

        

 

             Lab Interpretation (test code = Normal                             

    



             97254-9)                                            



[facility] 
10:04:37





             Test Item    Value        Reference Range Interpretation Comments

 

             HGB A1C (test code = 9.6 %        4.0-5.7      H            



             4548-4)                                             

 

             MAL (test code = MAL) Reference                              



                          RangesNormal:                           



                          <5.7%Prediabetes:                           



                          5.7 - 6.4%Diabetes:                           



                          > 6.5%                                 

 

             Lab Interpretation (test Abnormal                               



             code = 86374-3)                                        



[facility] WITH [id number] 09:29:41





             Test Item    Value        Reference Range Interpretation Comments

 

             WBC (test code =              See_Comment                [Automated

 message]



             6690-2)                                             The system [employer]



                                                                 generated this 

result



                                                                 transmitted ref

erence



                                                                 range: 4.20 - 1

0.70



                                                                 10*3/?L. The re

ference



                                                                 range was not u

sed to



                                                                 interpret this 

result



                                                                 as normal/abnor

mal.

 

             RBC (test code =              See_Comment                [Automated

 message]



             449-8)                                              The system [employer]



                                                                 generated this 

result



                                                                 transmitted ref

erence



                                                                 range: 4.26 - 5

.52



                                                                 10*6/?L. The re

ference



                                                                 range was not u

sed to



                                                                 interpret this 

result



                                                                 as normal/abnor

mal.

 

             HGB (test code = 14.7 g/dL    12.2-16.4                 



             718-7)                                              

 

             HCT (test code = 43.3 %       38.4-49.3                 



             4544-3)                                             

 

             MCV (test code = 86.9 fL      81.7-95.6                 



             787-2)                                              

 

             MCH (test code = 29.5 pg      26.1-32.7                 



             785-6)                                              

 

             MCHC (test code = 33.9 g/dL    31.2-35.0                 



             786-4)                                              

 

             RDW-SD (test code 42.5 fL      38.5-51.6                 



             = 97640-2)                                          

 

             RDW-CV (test code 13.5 %       12.1-15.4                 



             = 788-0)                                            

 

             PLT (test code =              See_Comment                [Automated

 message]



             777-3)                                              The system [employer]



                                                                 generated this 

result



                                                                 transmitted ref

erence



                                                                 range: 150 - 32

8



                                                                 10*3/?L. The re

ference



                                                                 range was not u

sed to



                                                                 interpret this 

result



                                                                 as normal/abnor

mal.

 

             MPV (test code = 10.8 fL      9.8-13.0                  



             80996-9)                                            

 

             NRBC/100 WBC (test              See_Comment                [Automat

ed message]



             code = 6292133256)                                        The syste

m which



                                                                 generated this 

result



                                                                 transmitted ref

erence



                                                                 range: 0.0 - 10

.0 /100



                                                                 WBCs. The refer

ence



                                                                 range was not u

sed to



                                                                 interpret this 

result



                                                                 as normal/abnor

mal.

 

             NRBC x10^3 (test <0.01        See_Comment                [Automated

 message]



             code = 8055692472)                                        The syste

m which



                                                                 generated this 

result



                                                                 transmitted ref

erence



                                                                 range: 10*3/?L.

 The



                                                                 reference range

 was not



                                                                 used to interpr

et this



                                                                 result as



                                                                 normal/abnormal

.

 

             GRAN MAT (NEUT) % 58.6 %                                 



             (test code =                                        



             770-8)                                              

 

             IMM GRAN % (test 0.50 %                                 



             code = 0354775899)                                        

 

             LYMPH % (test code 31.3 %                                 



             = 736-9)                                            

 

             MONO % (test code 6.9 %                                  



             = 5905-5)                                           

 

             EOS % (test code = 2.4 %                                  



             713-8)                                              

 

             BASO % (test code 0.3 %                                  



             = 706-2)                                            

 

             GRAN MAT     5.39 10*3/uL 1.99-6.95                 



             x10^3(ANC) (test                                        



             code = 1499246717)                                        

 

             IMM GRAN x10^3 0.05 10*3/uL 0.00-0.06                 



             (test code =                                        



             9266981651)                                         

 

             LYMPH x10^3 (test 2.89 10*3/uL 1.09-3.23                 



             code = 731-0)                                        

 

             MONO x10^3 (test 0.64 10*3/uL 0.36-1.02                 



             code = 742-7)                                        

 

             EOS x10^3 (test 0.22 10*3/uL 0.06-0.53                 



             code = 711-2)                                        

 

             BASO x10^3 (test 0.03 10*3/uL 0.01-0.09                 



             code = 704-7)                                        



[facility] GLUCOSE (AUTOMATED)2022 09:06:33





             Test Item    Value        Reference Range Interpretation Comments

 

             POCT GLU (test code = 3707319874) 206 mg/dL           H      

      

 

             Lab Interpretation (test code = Abnormal                           

    



             67262-0)                                            



[facility] GLUCOSE (AUTOMATED)2022 04:38:41





             Test Item    Value        Reference Range Interpretation Comments

 

             POCT GLU (test code = 6899992620) 272 mg/dL           H      

      

 

             Lab Interpretation (test code = Abnormal                           

    



             58360-6)                                            



[facility] GLUCOSE (AUTOMATED)2022 01:14:47





             Test Item    Value        Reference Range Interpretation Comments

 

             POCT GLU (test code = 1027065927) 256 mg/dL           H      

      

 

             Lab Interpretation (test code = Abnormal                           

    



             98304-8)                                            



[facility] GLUCOSE (AUTOMATED)2022 21:11:19





             Test Item    Value        Reference Range Interpretation Comments

 

             POCT GLU (test code = 1053538161) 254 mg/dL           H      

      

 

             Lab Interpretation (test code = Abnormal                           

    



             16857-7)                                            



[facility] GLUCOSE (AUTOMATED)2022 17:10:33





             Test Item    Value        Reference Range Interpretation Comments

 

             POCT GLU (test code = 6727538531) 350 mg/dL           H      

      

 

             Lab Interpretation (test code = Abnormal                           

    



             89315-5)                                            



[facility] GLUCOSE (AUTOMATED)2022 12:50:18





             Test Item    Value        Reference Range Interpretation Comments

 

             POCT GLU (test code = 1861602158) 269 mg/dL           H      

      

 

             Lab Interpretation (test code = Abnormal                           

    



             93170-1)                                            



[facility] Metabolic Panel (NA, K, CL, CO2, 
GLUCOSE, BUN, CREATININE, CA)2022 10:18:27





             Test Item    Value        Reference Range Interpretation Comments

 

             NA (test code = 137 mmol/L   135-145                   



             6767386353)                                         

 

             K (test code = 4.0 mmol/L   3.5-5.0                   



             0702464326)                                         

 

             CL (test code = 108 mmol/L                       



             9545190820)                                         

 

             CO2 TOTAL (test code = 23 mmol/L    23-31                     



             0688208731)                                         

 

             AGAP (test code =              2-16                      



             0599657136)                                         

 

             BUN (test code = 16 mg/dL     7-23                      



             0224005619)                                         

 

             GLUCOSE (test code = 386 mg/dL           H            



             8211653787)                                         

 

             CREATININE (test code = 0.70 mg/dL   0.60-1.25                 



             5214102341)                                         

 

             CALCIUM (test code = 8.6 mg/dL    8.6-10.6                  



             2626264686)                                         

 

             eGFR (test code =              mL/min/1.73m2              



             6453317881)                                         

 

             MAL (test code = MAL) Association of                           



                          Glomerular Filtration                           



                          Rate (GFR) and Staging                           



                          of Kidney Disease*                           



                          +---------------------                           



                          --+-------------------                           



                          --+-------------------                           



                          ------+| GFR                           



                          (mL/min/1.73 m2) ?|                           



                          With Kidney Damage ?|                           



                          ?Without Kidney                           



                          Damage+---------------                           



                          --------+-------------                           



                          --------+-------------                           



                          ------------+| ?>90 ?                           



                          ? ? ? ? ? ? ? ?|                           



                          ?Stage one ? ? ? ? ?|                           



                          ? Normal ? ? ? ? ? ? ?                           



                          ?+--------------------                           



                          ---+------------------                           



                          ---+------------------                           



                          -------+| ?60-89 ? ? ?                           



                          ? ? ? ? ?| ?Stage two                           



                          ? ? ? ? ?| ? Decreased                           



                          GFR ? ? ? ?                            



                          +---------------------                           



                          --+-------------------                           



                          --+-------------------                           



                          ------+| ?30-59 ? ? ?                           



                          ? ? ? ? ?| ?Stage                           



                          three ? ? ? ?| ? Stage                           



                          three ? ? ? ? ?                           



                          +---------------------                           



                          --+-------------------                           



                          --+-------------------                           



                          ------+| ?15-29 ? ? ?                           



                          ? ? ? ? ?| ?Stage four                           



                          ? ? ? ? | ? Stage four                           



                          ? ? ? ? ?                              



                          ?+--------------------                           



                          ---+------------------                           



                          ---+------------------                           



                          -------+| ?<15 (or                           



                          dialysis) ? ?| ?Stage                           



                          five ? ? ? ? | ? Stage                           



                          five ? ? ? ? ?                           



                          ?+--------------------                           



                          ---+------------------                           



                          ---+------------------                           



                          -------+ *Each stage                           



                          assumes the associated                           



                          GFR level has been in                           



                          effect for at least                           



                          three months. ?Stages                           



                          1 to 5, with or                           



                          without kidney                           



                          disease, indicate                           



                          chronic kidney                           



                          disease. Notes:                           



                          Determination of                           



                          stages one and two                           



                          (with eGFR                             



                          >59mL/min/1.73 m2)                           



                          requires estimation of                           



                          kidney damage for at                           



                          least three months as                           



                          defined by structural                           



                          or functional                           



                          abnormalities of the                           



                          kidney, manifested by                           



                          either:Pathological                           



                          abnormalities or                           



                          Markers of kidney                           



                          damage (including                           



                          abnormalities in the                           



                          composition of the                           



                          blood or urine or                           



                          abnormalities in                           



                          imaging tests).                           

 

             Lab Interpretation Abnormal                               



             (test code = 87879-2)                                        



[facility] with [id number] 10:03:31





             Test Item    Value        Reference Range Interpretation Comments

 

             WBC (test code =              See_Comment                [Automated

 message]



             6690-2)                                             The system [employer]



                                                                 generated this 

result



                                                                 transmitted ref

erence



                                                                 range: 4.20 - 1

0.70



                                                                 10*3/?L. The re

ference



                                                                 range was not u

sed to



                                                                 interpret this 

result



                                                                 as normal/abnor

mal.

 

             RBC (test code =              See_Comment                [Automated

 message]



             789-8)                                              The system [employer]



                                                                 generated this 

result



                                                                 transmitted ref

erence



                                                                 range: 4.26 - 5

.52



                                                                 10*6/?L. The re

ference



                                                                 range was not u

sed to



                                                                 interpret this 

result



                                                                 as normal/abnor

mal.

 

             HGB (test code = 15.5 g/dL    12.2-16.4                 



             458-7)                                              

 

             HCT (test code = 45.0 %       38.4-49.3                 



             4544-3)                                             

 

             MCV (test code = 85.9 fL      81.7-95.6                 



             787-2)                                              

 

             MCH (test code = 29.6 pg      26.1-32.7                 



             785-6)                                              

 

             MCHC (test code = 34.4 g/dL    31.2-35.0                 



             786-4)                                              

 

             RDW-SD (test code 42.5 fL      38.5-51.6                 



             = 63849-7)                                          

 

             RDW-CV (test code 13.5 %       12.1-15.4                 



             = 788-0)                                            

 

             PLT (test code =              See_Comment                [Automated

 message]



             777-3)                                              The system [employer]

h



                                                                 generated this 

result



                                                                 transmitted ref

erence



                                                                 range: 150 - 32

8



                                                                 10*3/?L. The re

ference



                                                                 range was not u

sed to



                                                                 interpret this 

result



                                                                 as normal/abnor

mal.

 

             MPV (test code = 11.2 fL      9.8-13.0                  



             95272-0)                                            

 

             NRBC/100 WBC (test              See_Comment                [Automat

ed message]



             code = 3619765922)                                        The syste

m which



                                                                 generated this 

result



                                                                 transmitted ref

erence



                                                                 range: 0.0 - 10

.0 /100



                                                                 WBCs. The refer

ence



                                                                 range was not u

sed to



                                                                 interpret this 

result



                                                                 as normal/abnor

mal.

 

             NRBC x10^3 (test <0.01        See_Comment                [Automated

 message]



             code = 1262354352)                                        The syste

m which



                                                                 generated this 

result



                                                                 transmitted ref

erence



                                                                 range: 10*3/?L.

 The



                                                                 reference range

 was not



                                                                 used to interpr

et this



                                                                 result as



                                                                 normal/abnormal

.

 

             GRAN MAT (NEUT) % 63.5 %                                 



             (test code =                                        



             770-8)                                              

 

             IMM GRAN % (test 0.30 %                                 



             code = 1357827861)                                        

 

             LYMPH % (test code 24.8 %                                 



             = 736-9)                                            

 

             MONO % (test code 8.7 %                                  



             = 5905-5)                                           

 

             EOS % (test code = 2.5 %                                  



             713-8)                                              

 

             BASO % (test code 0.2 %                                  



             = 706-2)                                            

 

             GRAN MAT     6.05 10*3/uL 1.99-6.95                 



             x10^3(ANC) (test                                        



             code = 1970473628)                                        

 

             IMM GRAN x10^3 0.03 10*3/uL 0.00-0.06                 



             (test code =                                        



             1788971922)                                         

 

             LYMPH x10^3 (test 2.37 10*3/uL 1.09-3.23                 



             code = 731-0)                                        

 

             MONO x10^3 (test 0.83 10*3/uL 0.36-1.02                 



             code = 742-7)                                        

 

             EOS x10^3 (test 0.24 10*3/uL 0.06-0.53                 



             code = 711-2)                                        

 

             BASO x10^3 (test <0.03        0.01-0.09                 



             code = 704-7)                                        



[facility] GLUCOSE (AUTOMATED)2022 09:00:15





             Test Item    Value        Reference Range Interpretation Comments

 

             POCT GLU (test code = 9271529078) 402 mg/dL           H      

      

 

             Lab Interpretation (test code = Abnormal                           

    



             41397-2)                                            



[facility] GLUCOSE (AUTOMATED)2022 04:54:36





             Test Item    Value        Reference Range Interpretation Comments

 

             POCT GLU (test code = 0264154191) 368 mg/dL           H      

      

 

             Lab Interpretation (test code = Abnormal                           

    



             14751-8)                                            



[facility] GLUCOSE (AUTOMATED)2022 03:13:40





             Test Item    Value        Reference Range Interpretation Comments

 

             POCT GLU (test code = 7132457204) 438 mg/dL           H      

      

 

             Lab Interpretation (test code = Abnormal                           

    



             64811-7)                                            



[facility]. METABOLIC PANEL (84260)2022 
01:05:39





             Test Item    Value        Reference Range Interpretation Comments

 

             NA (test code = 133 mmol/L   135-145      L            



             0966949216)                                         

 

             K (test code = 4.7 mmol/L   3.5-5.0                   



             1645116000)                                         

 

             CL (test code = 99 mmol/L                        



             0789938552)                                         

 

             CO2 TOTAL (test code = 21 mmol/L    23-31        L            



             0916636549)                                         

 

             AGAP (test code =              2-16                      



             6139808732)                                         

 

             BUN (test code = 18 mg/dL     7-23                      



             9954066568)                                         

 

             GLUCOSE (test code = 660 mg/dL           HH           



             3267968551)                                         

 

             CREATININE (test code = 0.64 mg/dL   0.60-1.25                 



             8846923665)                                         

 

             TOTAL BILI (test code = 1.0 mg/dL    0.1-1.1                   



             5929507167)                                         

 

             CALCIUM (test code = 9.7 mg/dL    8.6-10.6                  



             2982933659)                                         

 

             T PROTEIN (test code = 7.9 g/dL     6.3-8.2                   



             7991500088)                                         

 

             ALBUMIN (test code = 4.4 g/dL     3.5-5.0                   



             1376069190)                                         

 

             ALK PHOS (test code = 143 U/L             H            



             7574692773)                                         

 

             ALTv (test code = 47 U/L       5-50                      



             1742-6)                                             

 

             AST(SGOT) (test code = 30 U/L       13-40                     



             9462333964)                                         

 

             eGFR (test code =              mL/min/1.73m2              



             4156239493)                                         

 

             MAL (test code = MAL) Association of                           



                          Glomerular Filtration                           



                          Rate (GFR) and Staging                           



                          of Kidney Disease*                           



                          +---------------------                           



                          --+-------------------                           



                          --+-------------------                           



                          ------+| GFR                           



                          (mL/min/1.73 m2) ?|                           



                          With Kidney Damage ?|                           



                          ?Without Kidney                           



                          Damage+---------------                           



                          --------+-------------                           



                          --------+-------------                           



                          ------------+| ?>90 ?                           



                          ? ? ? ? ? ? ? ?|                           



                          ?Stage one ? ? ? ? ?|                           



                          ? Normal ? ? ? ? ? ? ?                           



                          ?+--------------------                           



                          ---+------------------                           



                          ---+------------------                           



                          -------+| ?60-89 ? ? ?                           



                          ? ? ? ? ?| ?Stage two                           



                          ? ? ? ? ?| ? Decreased                           



                          GFR ? ? ? ?                            



                          +---------------------                           



                          --+-------------------                           



                          --+-------------------                           



                          ------+| ?30-59 ? ? ?                           



                          ? ? ? ? ?| ?Stage                           



                          three ? ? ? ?| ? Stage                           



                          three ? ? ? ? ?                           



                          +---------------------                           



                          --+-------------------                           



                          --+-------------------                           



                          ------+| ?15-29 ? ? ?                           



                          ? ? ? ? ?| ?Stage four                           



                          ? ? ? ? | ? Stage four                           



                          ? ? ? ? ?                              



                          ?+--------------------                           



                          ---+------------------                           



                          ---+------------------                           



                          -------+| ?<15 (or                           



                          dialysis) ? ?| ?Stage                           



                          five ? ? ? ? | ? Stage                           



                          five ? ? ? ? ?                           



                          ?+--------------------                           



                          ---+------------------                           



                          ---+------------------                           



                          -------+ *Each stage                           



                          assumes the associated                           



                          GFR level has been in                           



                          effect for at least                           



                          three months. ?Stages                           



                          1 to 5, with or                           



                          without kidney                           



                          disease, indicate                           



                          chronic kidney                           



                          disease. Notes:                           



                          Determination of                           



                          stages one and two                           



                          (with eGFR                             



                          >59mL/min/1.73 m2)                           



                          requires estimation of                           



                          kidney damage for at                           



                          least three months as                           



                          defined by structural                           



                          or functional                           



                          abnormalities of the                           



                          kidney, manifested by                           



                          either:Pathological                           



                          abnormalities or                           



                          Markers of kidney                           



                          damage (including                           



                          abnormalities in the                           



                          composition of the                           



                          blood or urine or                           



                          abnormalities in                           



                          imaging tests).                           

 

             Lab Interpretation Abnormal                               



             (test code = 01768-1)                                        



[facility]





             Test Item    Value        Reference Range Interpretation Comments

 

             LIPASE (test code = 0594808259) 255 U/L      0-220        H        

    

 

             Lab Interpretation (test code = Abnormal                           

    



             37809-7)                                            



[facility] WITH [id number] 00:38:14





             Test Item    Value        Reference Range Interpretation Comments

 

             WBC (test code =              See_Comment  H             [Automated



             6690-2)                                             message] The sy

stem



                                                                 which generated



                                                                 this result



                                                                 transmitted



                                                                 reference range

:



                                                                 4.20 - 10.70



                                                                 10*3/?L. The



                                                                 reference range

 was



                                                                 not used to



                                                                 interpret this



                                                                 result as



                                                                 normal/abnormal

.

 

             RBC (test code =              See_Comment  H             [Automated



             789-8)                                              message] The sy

stem



                                                                 which generated



                                                                 this result



                                                                 transmitted



                                                                 reference range

:



                                                                 4.26 - 5.52



                                                                 10*6/?L. The



                                                                 reference range

 was



                                                                 not used to



                                                                 interpret this



                                                                 result as



                                                                 normal/abnormal

.

 

             HGB (test code = 16.5 g/dL    12.2-16.4    H            



             718-7)                                              

 

             HCT (test code = 48.9 %       38.4-49.3                 



             4544-3)                                             

 

             MCV (test code = 87.2 fL      81.7-95.6                 



             787-2)                                              

 

             MCH (test code = 29.4 pg      26.1-32.7                 



             785-6)                                              

 

             MCHC (test code = 33.7 g/dL    31.2-35.0                 



             786-4)                                              

 

             RDW-SD (test code = 43.8 fL      38.5-51.6                 



             83079-6)                                            

 

             RDW-CV (test code = 13.6 %       12.1-15.4                 



             788-0)                                              

 

             PLT (test code =              See_Comment                [Automated



             777-3)                                              message] The sy

stem



                                                                 which generated



                                                                 this result



                                                                 transmitted



                                                                 reference range

:



                                                                 150 - 328 10*3/

?L.



                                                                 The reference r

zhen



                                                                 was not used to



                                                                 interpret this



                                                                 result as



                                                                 normal/abnormal

.

 

             MPV (test code = 11.4 fL      9.8-13.0                  



             99017-7)                                            

 

             NRBC/100 WBC (test              See_Comment                [Automat

ed



             code = 1625812587)                                        message] 

The system



                                                                 which generated



                                                                 this result



                                                                 transmitted



                                                                 reference range

:



                                                                 0.0 - 10.0 /100



                                                                 WBCs. The refer

ence



                                                                 range was not u

sed



                                                                 to interpret th

is



                                                                 result as



                                                                 normal/abnormal

.

 

             NRBC x10^3 (test code <0.01        See_Comment                [Auto

mated



             = 9572286761)                                        message] The s

ystem



                                                                 which generated



                                                                 this result



                                                                 transmitted



                                                                 reference range

:



                                                                 10*3/?L. The



                                                                 reference range

 was



                                                                 not used to



                                                                 interpret this



                                                                 result as



                                                                 normal/abnormal

.

 

             GRAN MAT (NEUT) % 76.0 %                                 



             (test code = 770-8)                                        

 

             IMM GRAN % (test code 0.50 %                                 



             = 6551842095)                                        

 

             LYMPH % (test code = 15.2 %                                 



             736-9)                                              

 

             MONO % (test code = 6.5 %                                  



             5905-5)                                             

 

             EOS % (test code = 1.6 %                                  



             713-8)                                              

 

             BASO % (test code = 0.2 %                                  



             706-2)                                              

 

             GRAN MAT x10^3(ANC) 8.55 10*3/uL 1.99-6.95    H            



             (test code =                                        



             6626749777)                                         

 

             IMM GRAN x10^3 (test 0.06 10*3/uL 0.00-0.06                 



             code = 5893056474)                                        

 

             LYMPH x10^3 (test code 1.71 10*3/uL 1.09-3.23                 



             = 731-0)                                            

 

             MONO x10^3 (test code 0.73 10*3/uL 0.36-1.02                 



             = 742-7)                                            

 

             EOS x10^3 (test code = 0.18 10*3/uL 0.06-0.53                 



             711-2)                                              

 

             BASO x10^3 (test code <0.03        0.01-0.09                 



             = 704-7)                                            

 

             Lab Interpretation Abnormal                               



             (test code = 23072-5)                                        



[facility]





             Test Item    Value        Reference Range Interpretation Comments

 

             MAGNESIUM (test code = 9955693001) 1.7 mg/dL    1.7-2.4            

       

 

             Lab Interpretation (test code = Normal                             

    



             92515-2)                                            



[facility] METABOLIC PANEL (NA, K, CL, CO2, 
GLUCOSE, BUN, CREATININE, CA)2022 18:32:38





             Test Item    Value        Reference Range Interpretation Comments

 

             NA (test code = 137 mmol/L   135-145                   



             4717262403)                                         

 

             K (test code = 4.3 mmol/L   3.5-5.0                   



             9560577132)                                         

 

             CL (test code = 105 mmol/L                       



             3465880032)                                         

 

             CO2 TOTAL (test code = 23 mmol/L    23-31                     



             7342768054)                                         

 

             AGAP (test code =              2-16                      



             2915184879)                                         

 

             BUN (test code = 17 mg/dL     7-23                      



             1656071615)                                         

 

             GLUCOSE (test code = 317 mg/dL           H            



             5807239713)                                         

 

             CREATININE (test code = 0.57 mg/dL   0.60-1.25    L            



             9228938950)                                         

 

             CALCIUM (test code = 9.3 mg/dL    8.6-10.6                  



             5778765119)                                         

 

             eGFR (test code =              mL/min/1.73m2              



             1012342397)                                         

 

             MAL (test code = MAL) Association of                           



                          Glomerular Filtration                           



                          Rate (GFR) and Staging                           



                          of Kidney Disease*                           



                          +---------------------                           



                          --+-------------------                           



                          --+-------------------                           



                          ------+| GFR                           



                          (mL/min/1.73 m2) ?|                           



                          With Kidney Damage ?|                           



                          ?Without Kidney                           



                          Damage+---------------                           



                          --------+-------------                           



                          --------+-------------                           



                          ------------+| ?>90 ?                           



                          ? ? ? ? ? ? ? ?|                           



                          ?Stage one ? ? ? ? ?|                           



                          ? Normal ? ? ? ? ? ? ?                           



                          ?+--------------------                           



                          ---+------------------                           



                          ---+------------------                           



                          -------+| ?60-89 ? ? ?                           



                          ? ? ? ? ?| ?Stage two                           



                          ? ? ? ? ?| ? Decreased                           



                          GFR ? ? ? ?                            



                          +---------------------                           



                          --+-------------------                           



                          --+-------------------                           



                          ------+| ?30-59 ? ? ?                           



                          ? ? ? ? ?| ?Stage                           



                          three ? ? ? ?| ? Stage                           



                          three ? ? ? ? ?                           



                          +---------------------                           



                          --+-------------------                           



                          --+-------------------                           



                          ------+| ?15-29 ? ? ?                           



                          ? ? ? ? ?| ?Stage four                           



                          ? ? ? ? | ? Stage four                           



                          ? ? ? ? ?                              



                          ?+--------------------                           



                          ---+------------------                           



                          ---+------------------                           



                          -------+| ?<15 (or                           



                          dialysis) ? ?| ?Stage                           



                          five ? ? ? ? | ? Stage                           



                          five ? ? ? ? ?                           



                          ?+--------------------                           



                          ---+------------------                           



                          ---+------------------                           



                          -------+ *Each stage                           



                          assumes the associated                           



                          GFR level has been in                           



                          effect for at least                           



                          three months. ?Stages                           



                          1 to 5, with or                           



                          without kidney                           



                          disease, indicate                           



                          chronic kidney                           



                          disease. Notes:                           



                          Determination of                           



                          stages one and two                           



                          (with eGFR                             



                          >59mL/min/1.73 m2)                           



                          requires estimation of                           



                          kidney damage for at                           



                          least three months as                           



                          defined by structural                           



                          or functional                           



                          abnormalities of the                           



                          kidney, manifested by                           



                          either:Pathological                           



                          abnormalities or                           



                          Markers of kidney                           



                          damage (including                           



                          abnormalities in the                           



                          composition of the                           



                          blood or urine or                           



                          abnormalities in                           



                          imaging tests).                           

 

             Lab Interpretation Abnormal                               



             (test code = 68069-4)                                        



[facility] WITH YEXD2057-08-52 18:10:18





             Test Item    Value        Reference Range Interpretation Comments

 

             WBC (test code =              See_Comment                [Automated

 message]



             6690-2)                                             The system [employer]



                                                                 generated this 

result



                                                                 transmitted ref

erence



                                                                 range: 4.20 - 1

0.70



                                                                 10*3/?L. The re

ference



                                                                 range was not u

sed to



                                                                 interpret this 

result



                                                                 as normal/abnor

mal.

 

             RBC (test code =              See_Comment                [Automated

 message]



             789-8)                                              The system [employer]



                                                                 generated this 

result



                                                                 transmitted ref

erence



                                                                 range: 4.26 - 5

.52



                                                                 10*6/?L. The re

ference



                                                                 range was not u

sed to



                                                                 interpret this 

result



                                                                 as normal/abnor

mal.

 

             HGB (test code = 16.0 g/dL    12.2-16.4                 



             718-7)                                              

 

             HCT (test code = 47.3 %       38.4-49.3                 



             4544-3)                                             

 

             MCV (test code = 87.1 fL      81.7-95.6                 



             787-2)                                              

 

             MCH (test code = 29.5 pg      26.1-32.7                 



             785-6)                                              

 

             MCHC (test code = 33.8 g/dL    31.2-35.0                 



             786-4)                                              

 

             RDW-SD (test code 44.4 fL      38.5-51.6                 



             = 64018-8)                                          

 

             RDW-CV (test code 13.8 %       12.1-15.4                 



             = 788-0)                                            

 

             PLT (test code =              See_Comment                [Automated

 message]



             777-3)                                              The system [employer]



                                                                 generated this 

result



                                                                 transmitted ref

erence



                                                                 range: 150 - 32

8



                                                                 10*3/?L. The re

ference



                                                                 range was not u

sed to



                                                                 interpret this 

result



                                                                 as normal/abnor

mal.

 

             MPV (test code = 10.9 fL      9.8-13.0                  



             93018-7)                                            

 

             NRBC/100 WBC (test              See_Comment                [Automat

ed message]



             code = 2069454298)                                        The [employer]

[employer] which



                                                                 generated this 

result



                                                                 transmitted ref

erence



                                                                 range: 0.0 - 10

.0 /100



                                                                 WBCs. The refer

ence



                                                                 range was not u

sed to



                                                                 interpret this 

result



                                                                 as normal/abnor

mal.

 

             NRBC x10^3 (test <0.01        See_Comment                [Automated

 message]



             code = 2463428614)                                        The syste

m which



                                                                 generated this 

result



                                                                 transmitted ref

erence



                                                                 range: 10*3/?L.

 The



                                                                 reference range

 was not



                                                                 used to interpr

et this



                                                                 result as



                                                                 normal/abnormal

.

 

             GRAN MAT (NEUT) % 53.4 %                                 



             (test code =                                        



             770-8)                                              

 

             IMM GRAN % (test 0.30 %                                 



             code = 8119220806)                                        

 

             LYMPH % (test code 35.3 %                                 



             = 736-9)                                            

 

             MONO % (test code 7.4 %                                  



             = 5905-5)                                           

 

             EOS % (test code = 3.3 %                                  



             713-8)                                              

 

             BASO % (test code 0.3 %                                  



             = 706-2)                                            

 

             GRAN MAT     3.59 10*3/uL 1.99-6.95                 



             x10^3(ANC) (test                                        



             code = 7395235479)                                        

 

             IMM GRAN x10^3 <0.03        0.00-0.06                 



             (test code =                                        



             0724215863)                                         

 

             LYMPH x10^3 (test 2.37 10*3/uL 1.09-3.23                 



             code = 731-0)                                        

 

             MONO x10^3 (test 0.50 10*3/uL 0.36-1.02                 



             code = 742-7)                                        

 

             EOS x10^3 (test 0.22 10*3/uL 0.06-0.53                 



             code = 711-2)                                        

 

             BASO x10^3 (test <0.03        0.01-0.09                 



             code = 704-7)                                        



[facility] GLUCOSE (AUTOMATED)2022 16:50:23





             Test Item    Value        Reference Range Interpretation Comments

 

             POCT GLU (test code = 5511613658) 304 mg/dL           H      

      

 

             Lab Interpretation (test code = Abnormal                           

    



             55083-8)                                            



[facility] GLUCOSE (AUTOMATED)2022 12:58:13





             Test Item    Value        Reference Range Interpretation Comments

 

             POCT GLU (test code = 9165511500) 364 mg/dL           H      

      

 

             Lab Interpretation (test code = Abnormal                           

    



             64592-4)                                            



[facility] GLUCOSE (AUTOMATED)2022 09:42:05





             Test Item    Value        Reference Range Interpretation Comments

 

             POCT GLU (test code = 7107100721) 369 mg/dL           H      

      

 

             Lab Interpretation (test code = Abnormal                           

    



             73293-0)                                            



[facility] GLUCOSE (AUTOMATED)2022 05:09:33





             Test Item    Value        Reference Range Interpretation Comments

 

             POCT GLU (test code = 4346629997) 332 mg/dL           H      

      

 

             Lab Interpretation (test code = Abnormal                           

    



             15333-0)                                            



[facility] GLUCOSE (AUTOMATED)2022 01:27:31





             Test Item    Value        Reference Range Interpretation Comments

 

             POCT GLU (test code = 0961322135) 349 mg/dL           H      

      

 

             Lab Interpretation (test code = Abnormal                           

    



             38392-8)                                            



[facility] GLUCOSE (AUTOMATED)2022 21:42:26





             Test Item    Value        Reference Range Interpretation Comments

 

             POCT GLU (test code = 0517576467) 372 mg/dL           H      

      

 

             Lab Interpretation (test code = Abnormal                           

    



             36531-8)                                            



[facility] GLUCOSE (AUTOMATED)2022 17:17:16





             Test Item    Value        Reference Range Interpretation Comments

 

             POCT GLU (test code = 2554301563) 329 mg/dL           H      

      

 

             Lab Interpretation (test code = Abnormal                           

    



             87142-8)                                            



[facility] GLUCOSE (AUTOMATED)2022 14:09:34





             Test Item    Value        Reference Range Interpretation Comments

 

             POCT GLU (test code = 3745617850) 350 mg/dL           H      

      

 

             Lab Interpretation (test code = Abnormal                           

    



             97261-4)                                            



[facility] GLUCOSE (AUTOMATED)2022 09:59:20





             Test Item    Value        Reference Range Interpretation Comments

 

             POCT GLU (test code = 8484681579) 342 mg/dL           H      

      

 

             Lab Interpretation (test code = Abnormal                           

    



             74504-1)                                            



[facility] GLUCOSE (AUTOMATED)2022 01:46:05





             Test Item    Value        Reference Range Interpretation Comments

 

             POCT GLU (test code = 0782181908) 318 mg/dL           H      

      

 

             Lab Interpretation (test code = Abnormal                           

    



             97799-2)                                            



[facility] GLUCOSE (AUTOMATED)2022 21:25:33





             Test Item    Value        Reference Range Interpretation Comments

 

             POCT GLU (test code = 3443739209) 212 mg/dL           H      

      

 

             Lab Interpretation (test code = Abnormal                           

    



             82488-7)                                            



[facility] GLUCOSE (AUTOMATED)2022 16:27:52





             Test Item    Value        Reference Range Interpretation Comments

 

             POCT GLU (test code = 8603259118) 226 mg/dL           H      

      

 

             Lab Interpretation (test code = Abnormal                           

    



             81114-9)                                            



[facility] GLUCOSE (AUTOMATED)2022 15:37:46





             Test Item    Value        Reference Range Interpretation Comments

 

             POCT GLU (test code = 6462122495) 204 mg/dL           H      

      

 

             Lab Interpretation (test code = Abnormal                           

    



             44725-6)                                            



[facility]





             Test Item    Value        Reference Range Interpretation Comments

 

             POCT GLU (test code = 1936446592) 185 mg/dL           H      

      

 

             Lab Interpretation (test code = Abnormal                           

    



             18313-7)                                            



[facility] Metabolic Panel (Na, K, Cl, CO2, 
Glucose, BUN, Creatinine, Ca)2022 14:07:03





             Test Item    Value        Reference Range Interpretation Comments

 

             NA (test code = 137 mmol/L   135-145                   



             6239990772)                                         

 

             K (test code = 4.6 mmol/L   3.5-5.0                   



             9594971183)                                         

 

             CL (test code = 111 mmol/L          H            



             2337218017)                                         

 

             CO2 TOTAL (test code = 22 mmol/L    23-31        L            



             0847776271)                                         

 

             AGAP (test code =              2-16                      



             2780075432)                                         

 

             BUN (test code = 12 mg/dL     7-23                      



             7170805850)                                         

 

             GLUCOSE (test code = 181 mg/dL           H            



             0624627309)                                         

 

             CREATININE (test code = 0.70 mg/dL   0.60-1.25                 



             5627956133)                                         

 

             CALCIUM (test code = 8.3 mg/dL    8.6-10.6     L            



             9335807026)                                         

 

             eGFR (test code =              mL/min/1.73m2              



             9625102849)                                         

 

             MAL (test code = MAL) Association of                           



                          Glomerular Filtration                           



                          Rate (GFR) and Staging                           



                          of Kidney Disease*                           



                          +---------------------                           



                          --+-------------------                           



                          --+-------------------                           



                          ------+| GFR                           



                          (mL/min/1.73 m2) ?|                           



                          With Kidney Damage ?|                           



                          ?Without Kidney                           



                          Damage+---------------                           



                          --------+-------------                           



                          --------+-------------                           



                          ------------+| ?>90 ?                           



                          ? ? ? ? ? ? ? ?|                           



                          ?Stage one ? ? ? ? ?|                           



                          ? Normal ? ? ? ? ? ? ?                           



                          ?+--------------------                           



                          ---+------------------                           



                          ---+------------------                           



                          -------+| ?60-89 ? ? ?                           



                          ? ? ? ? ?| ?Stage two                           



                          ? ? ? ? ?| ? Decreased                           



                          GFR ? ? ? ?                            



                          +---------------------                           



                          --+-------------------                           



                          --+-------------------                           



                          ------+| ?30-59 ? ? ?                           



                          ? ? ? ? ?| ?Stage                           



                          three ? ? ? ?| ? Stage                           



                          three ? ? ? ? ?                           



                          +---------------------                           



                          --+-------------------                           



                          --+-------------------                           



                          ------+| ?15-29 ? ? ?                           



                          ? ? ? ? ?| ?Stage four                           



                          ? ? ? ? | ? Stage four                           



                          ? ? ? ? ?                              



                          ?+--------------------                           



                          ---+------------------                           



                          ---+------------------                           



                          -------+| ?<15 (or                           



                          dialysis) ? ?| ?Stage                           



                          five ? ? ? ? | ? Stage                           



                          five ? ? ? ? ?                           



                          ?+--------------------                           



                          ---+------------------                           



                          ---+------------------                           



                          -------+ *Each stage                           



                          assumes the associated                           



                          GFR level has been in                           



                          effect for at least                           



                          three months. ?Stages                           



                          1 to 5, with or                           



                          without kidney                           



                          disease, indicate                           



                          chronic kidney                           



                          disease. Notes:                           



                          Determination of                           



                          stages one and two                           



                          (with eGFR                             



                          >59mL/min/1.73 m2)                           



                          requires estimation of                           



                          kidney damage for at                           



                          least three months as                           



                          defined by structural                           



                          or functional                           



                          abnormalities of the                           



                          kidney, manifested by                           



                          either:Pathological                           



                          abnormalities or                           



                          Markers of kidney                           



                          damage (including                           



                          abnormalities in the                           



                          composition of the                           



                          blood or urine or                           



                          abnormalities in                           



                          imaging tests).                           

 

             Lab Interpretation Abnormal                               



             (test code = 92984-0)                                        



[facility] GLUCOSE (AUTOMATED)2022 13:45:48





             Test Item    Value        Reference Range Interpretation Comments

 

             POCT GLU (test code = 5833259015) 170 mg/dL           H      

      

 

             Lab Interpretation (test code = Abnormal                           

    



             33203-2)                                            



[facility] GLUCOSE (AUTOMATED)2022 12:28:12





             Test Item    Value        Reference Range Interpretation Comments

 

             POCT GLU (test code = 3058679823) 109 mg/dL                  

      

 

             Lab Interpretation (test code = Normal                             

    



             24204-9)                                            



[facility] GLUCOSE (AUTOMATED)2022 11:25:37





             Test Item    Value        Reference Range Interpretation Comments

 

             POCT GLU (test code = 6316410387) 134 mg/dL           H      

      

 

             Lab Interpretation (test code = Abnormal                           

    



             08115-0)                                            



[facility] Metabolic Panel (Na, K, Cl, CO2, 
Glucose, BUN, Creatinine, Ca)2022 10:50:17





             Test Item    Value        Reference Range Interpretation Comments

 

             NA (test code = 138 mmol/L   135-145                   



             4305610170)                                         

 

             K (test code = 5.1 mmol/L   3.5-5.0      H            Slight



             9110589458)                                         hemolysis

 

             CL (test code = 110 mmol/L          H            



             8308263108)                                         

 

             CO2 TOTAL (test code 23 mmol/L    23-31                     



             = 8328518516)                                        

 

             AGAP (test code =              2-16                      



             9243872145)                                         

 

             BUN (test code = 13 mg/dL     7-23                      Slight



             2979709321)                                         hemolysis

 

             GLUCOSE (test code = 131 mg/dL           H            



             8627049626)                                         

 

             CREATININE (test code 0.73 mg/dL   0.60-1.25                 



             = 6706295135)                                        

 

             CALCIUM (test code = 8.9 mg/dL    8.6-10.6                  



             8182628876)                                         

 

             eGFR (test code =              mL/min/1.73m2              



             9051694143)                                         

 

             MAL (test code = MAL) Association of                           



                          Glomerular                             



                          Filtration Rate                           



                          (GFR) and Staging                           



                          of Kidney Disease*                           



                          +------------------                           



                          -----+-------------                           



                          --------+----------                           



                          ---------------+|                           



                          GFR (mL/min/1.73                           



                          m2) ?| With Kidney                           



                          Damage ?| ?Without                           



                          Kidney                                 



                          Damage+------------                           



                          -----------+-------                           



                          --------------+----                           



                          -------------------                           



                          --+| ?>90 ? ? ? ? ?                           



                          ? ? ? ?| ?Stage one                           



                          ? ? ? ? ?| ? Normal                           



                          ? ? ? ? ? ? ?                           



                          ?+-----------------                           



                          ------+------------                           



                          ---------+---------                           



                          ----------------+|                           



                          ?60-89 ? ? ? ? ? ?                           



                          ? ?| ?Stage two ? ?                           



                          ? ? ?| ? Decreased                           



                          GFR ? ? ? ?                            



                          +------------------                           



                          -----+-------------                           



                          --------+----------                           



                          ---------------+|                           



                          ?30-59 ? ? ? ? ? ?                           



                          ? ?| ?Stage three ?                           



                          ? ? ?| ? Stage                           



                          three ? ? ? ? ?                           



                          +------------------                           



                          -----+-------------                           



                          --------+----------                           



                          ---------------+|                           



                          ?15-29 ? ? ? ? ? ?                           



                          ? ?| ?Stage four ?                           



                          ? ? ? | ? Stage                           



                          four ? ? ? ? ?                           



                          ?+-----------------                           



                          ------+------------                           



                          ---------+---------                           



                          ----------------+|                           



                          ?<15 (or dialysis)                           



                          ? ?| ?Stage five ?                           



                          ? ? ? | ? Stage                           



                          five ? ? ? ? ?                           



                          ?+-----------------                           



                          ------+------------                           



                          ---------+---------                           



                          ----------------+                           



                          *Each stage assumes                           



                          the associated GFR                           



                          level has been in                           



                          effect for at least                           



                          three months.                           



                          ?Stages 1 to 5,                           



                          with or without                           



                          kidney disease,                           



                          indicate chronic                           



                          kidney disease.                           



                          Notes:                                 



                          Determination of                           



                          stages one and two                           



                          (with eGFR                             



                          >59mL/min/1.73 m2)                           



                          requires estimation                           



                          of kidney damage                           



                          for at least three                           



                          months as defined                           



                          by structural or                           



                          functional                             



                          abnormalities of                           



                          the kidney,                            



                          manifested by                           



                          either:Pathological                           



                          abnormalities or                           



                          Markers of kidney                           



                          damage (including                           



                          abnormalities in                           



                          the composition of                           



                          the blood or urine                           



                          or abnormalities in                           



                          imaging tests).                           

 

             Lab Interpretation Abnormal                               



             (test code = 90316-8)                                        



[facility] GLUCOSE (AUTOMATED)2022 10:35:11





             Test Item    Value        Reference Range Interpretation Comments

 

             POCT GLU (test code = 7311948083) 125 mg/dL           H      

      

 

             Lab Interpretation (test code = Abnormal                           

    



             45031-7)                                            



[facility] GLUCOSE (AUTOMATED)2022 09:31:05





             Test Item    Value        Reference Range Interpretation Comments

 

             POCT GLU (test code = 7731579799) 137 mg/dL           H      

      

 

             Lab Interpretation (test code = Abnormal                           

    



             99003-8)                                            



[facility] GLUCOSE (AUTOMATED)2022 08:28:48





             Test Item    Value        Reference Range Interpretation Comments

 

             POCT GLU (test code = 1674540272) 168 mg/dL           H      

      

 

             Lab Interpretation (test code = Abnormal                           

    



             46170-4)                                            



[facility] GLUCOSE (AUTOMATED)2022 07:26:17





             Test Item    Value        Reference Range Interpretation Comments

 

             POCT GLU (test code = 3249440065) 165 mg/dL           H      

      

 

             Lab Interpretation (test code = Abnormal                           

    



             80172-6)                                            



[facility] Metabolic Panel (Na, K, Cl, CO2, 
Glucose, BUN, Creatinine, Ca)2022 07:18:59





             Test Item    Value        Reference Range Interpretation Comments

 

             NA (test code = 136 mmol/L   135-145                   



             9544854187)                                         

 

             K (test code = 5.1 mmol/L   3.5-5.0      H            



             8674128907)                                         

 

             CL (test code = 108 mmol/L                       



             3421309582)                                         

 

             CO2 TOTAL (test code = 24 mmol/L    23-31                     



             2631190819)                                         

 

             AGAP (test code =              2-16                      



             7274614689)                                         

 

             BUN (test code = 14 mg/dL     7-23                      



             5893308158)                                         

 

             GLUCOSE (test code = 202 mg/dL           H            



             3027196267)                                         

 

             CREATININE (test code = 0.79 mg/dL   0.60-1.25                 



             7345493696)                                         

 

             CALCIUM (test code = 8.9 mg/dL    8.6-10.6                  



             9085063740)                                         

 

             eGFR (test code =              mL/min/1.73m2              



             7911545234)                                         

 

             MAL (test code = MAL) Association of                           



                          Glomerular Filtration                           



                          Rate (GFR) and Staging                           



                          of Kidney Disease*                           



                          +---------------------                           



                          --+-------------------                           



                          --+-------------------                           



                          ------+| GFR                           



                          (mL/min/1.73 m2) ?|                           



                          With Kidney Damage ?|                           



                          ?Without Kidney                           



                          Damage+---------------                           



                          --------+-------------                           



                          --------+-------------                           



                          ------------+| ?>90 ?                           



                          ? ? ? ? ? ? ? ?|                           



                          ?Stage one ? ? ? ? ?|                           



                          ? Normal ? ? ? ? ? ? ?                           



                          ?+--------------------                           



                          ---+------------------                           



                          ---+------------------                           



                          -------+| ?60-89 ? ? ?                           



                          ? ? ? ? ?| ?Stage two                           



                          ? ? ? ? ?| ? Decreased                           



                          GFR ? ? ? ?                            



                          +---------------------                           



                          --+-------------------                           



                          --+-------------------                           



                          ------+| ?30-59 ? ? ?                           



                          ? ? ? ? ?| ?Stage                           



                          three ? ? ? ?| ? Stage                           



                          three ? ? ? ? ?                           



                          +---------------------                           



                          --+-------------------                           



                          --+-------------------                           



                          ------+| ?15-29 ? ? ?                           



                          ? ? ? ? ?| ?Stage four                           



                          ? ? ? ? | ? Stage four                           



                          ? ? ? ? ?                              



                          ?+--------------------                           



                          ---+------------------                           



                          ---+------------------                           



                          -------+| ?<15 (or                           



                          dialysis) ? ?| ?Stage                           



                          five ? ? ? ? | ? Stage                           



                          five ? ? ? ? ?                           



                          ?+--------------------                           



                          ---+------------------                           



                          ---+------------------                           



                          -------+ *Each stage                           



                          assumes the associated                           



                          GFR level has been in                           



                          effect for at least                           



                          three months. ?Stages                           



                          1 to 5, with or                           



                          without kidney                           



                          disease, indicate                           



                          chronic kidney                           



                          disease. Notes:                           



                          Determination of                           



                          stages one and two                           



                          (with eGFR                             



                          >59mL/min/1.73 m2)                           



                          requires estimation of                           



                          kidney damage for at                           



                          least three months as                           



                          defined by structural                           



                          or functional                           



                          abnormalities of the                           



                          kidney, manifested by                           



                          either:Pathological                           



                          abnormalities or                           



                          Markers of kidney                           



                          damage (including                           



                          abnormalities in the                           



                          composition of the                           



                          blood or urine or                           



                          abnormalities in                           



                          imaging tests).                           

 

             Lab Interpretation Abnormal                               



             (test code = 83784-9)                                        



[id number]





             Test Item    Value        Reference Range Interpretation Comments

 

             BOH (test code = <0.1         mmol/L                    



             6632584322)                                         

 

             MAL (test code = Normal Ranges: ? ?                           



             MAL)         Nonfasting ? ? ? ? ? Less                           



                          than 0.1 mmol/L ? ?                           



                          Overnight Fast ? ? ? Less                           



                          than 0.4 mmol/L ? ? Fasting                           



                          (1-2 weeks) ?6-8 mmol/L Test                          

 



                          developed and                           



                          characteristics determined                           



                          by [facility].                          

 



[facility] GLUCOSE (AUTOMATED)2022 06:24:07





             Test Item    Value        Reference Range Interpretation Comments

 

             POCT GLU (test code = 8041616272) 212 mg/dL           H      

      

 

             Lab Interpretation (test code = Abnormal                           

    



             65903-2)                                            



[facility] GLUCOSE (AUTOMATED)2022 05:28:19





             Test Item    Value        Reference Range Interpretation Comments

 

             POCT GLU (test code = 2860971284) 239 mg/dL           H      

      

 

             Lab Interpretation (test code = Abnormal                           

    



             50742-0)                                            



[facility] GLUCOSE (AUTOMATED)2022 04:29:31





             Test Item    Value        Reference Range Interpretation Comments

 

             POCT GLU (test code = 4399681367) 251 mg/dL           H      

      

 

             Lab Interpretation (test code = Abnormal                           

    



             71917-6)                                            



[facility] (Na, K, Cl, CO2, 
Glucose, BUN, Creatinine, Ca)2022 03:49:55





             Test Item    Value        Reference Range Interpretation Comments

 

             NA (test code = 141 mmol/L   135-145                   



             9696071421)                                         

 

             K (test code = 4.2 mmol/L   3.5-5.0                   



             0829356020)                                         

 

             CL (test code = 108 mmol/L                       



             4552117958)                                         

 

             CO2 TOTAL (test code = 26 mmol/L    23-31                     



             9733440374)                                         

 

             AGAP (test code =              2-16                      



             9942981773)                                         

 

             BUN (test code = 14 mg/dL     7-23                      



             4401939053)                                         

 

             GLUCOSE (test code = 164 mg/dL           H            



             4501289491)                                         

 

             CREATININE (test code = 0.84 mg/dL   0.60-1.25                 



             8068360207)                                         

 

             CALCIUM (test code = 9.3 mg/dL    8.6-10.6                  



             5680480671)                                         

 

             eGFR (test code =              mL/min/1.73m2              



             3551759204)                                         

 

             MAL (test code = MAL) Association of                           



                          Glomerular Filtration                           



                          Rate (GFR) and Staging                           



                          of Kidney Disease*                           



                          +---------------------                           



                          --+-------------------                           



                          --+-------------------                           



                          ------+| GFR                           



                          (mL/min/1.73 m2) ?|                           



                          With Kidney Damage ?|                           



                          ?Without Kidney                           



                          Damage+---------------                           



                          --------+-------------                           



                          --------+-------------                           



                          ------------+| ?>90 ?                           



                          ? ? ? ? ? ? ? ?|                           



                          ?Stage one ? ? ? ? ?|                           



                          ? Normal ? ? ? ? ? ? ?                           



                          ?+--------------------                           



                          ---+------------------                           



                          ---+------------------                           



                          -------+| ?60-89 ? ? ?                           



                          ? ? ? ? ?| ?Stage two                           



                          ? ? ? ? ?| ? Decreased                           



                          GFR ? ? ? ?                            



                          +---------------------                           



                          --+-------------------                           



                          --+-------------------                           



                          ------+| ?30-59 ? ? ?                           



                          ? ? ? ? ?| ?Stage                           



                          three ? ? ? ?| ? Stage                           



                          three ? ? ? ? ?                           



                          +---------------------                           



                          --+-------------------                           



                          --+-------------------                           



                          ------+| ?15-29 ? ? ?                           



                          ? ? ? ? ?| ?Stage four                           



                          ? ? ? ? | ? Stage four                           



                          ? ? ? ? ?                              



                          ?+--------------------                           



                          ---+------------------                           



                          ---+------------------                           



                          -------+| ?<15 (or                           



                          dialysis) ? ?| ?Stage                           



                          five ? ? ? ? | ? Stage                           



                          five ? ? ? ? ?                           



                          ?+--------------------                           



                          ---+------------------                           



                          ---+------------------                           



                          -------+ *Each stage                           



                          assumes the associated                           



                          GFR level has been in                           



                          effect for at least                           



                          three months. ?Stages                           



                          1 to 5, with or                           



                          without kidney                           



                          disease, indicate                           



                          chronic kidney                           



                          disease. Notes:                           



                          Determination of                           



                          stages one and two                           



                          (with eGFR                             



                          >59mL/min/1.73 m2)                           



                          requires estimation of                           



                          kidney damage for at                           



                          least three months as                           



                          defined by structural                           



                          or functional                           



                          abnormalities of the                           



                          kidney, manifested by                           



                          either:Pathological                           



                          abnormalities or                           



                          Markers of kidney                           



                          damage (including                           



                          abnormalities in the                           



                          composition of the                           



                          blood or urine or                           



                          abnormalities in                           



                          imaging tests).                           

 

             Lab Interpretation Abnormal                               



             (test code = 24271-6)                                        



[facility] GLUCOSE (AUTOMATED)2022 03:20:15





             Test Item    Value        Reference Range Interpretation Comments

 

             POCT GLU (test code = 1929751757) 174 mg/dL           H      

      

 

             Lab Interpretation (test code = Abnormal                           

    



             49041-4)                                            



[facility] GLUCOSE (AUTOMATED)2022 02:25:54





             Test Item    Value        Reference Range Interpretation Comments

 

             POCT GLU (test code = 9295504431) 158 mg/dL           H      

      

 

             Lab Interpretation (test code = Abnormal                           

    



             56866-0)                                            



[facility] GLUCOSE (AUTOMATED)2022 01:37:09





             Test Item    Value        Reference Range Interpretation Comments

 

             POCT GLU (test code = 2790259635) 219 mg/dL           H      

      

 

             Lab Interpretation (test code = Abnormal                           

    



             22052-7)                                            



[facility] GLUCOSE (AUTOMATED)2022 00:19:48





             Test Item    Value        Reference Range Interpretation Comments

 

             POCT GLU (test code = 0682618153) 345 mg/dL           H      

      

 

             Lab Interpretation (test code = Abnormal                           

    



             82718-6)                                            



[facility] GLUCOSE (AUTOMATED)2022 22:59:57





             Test Item    Value        Reference Range Interpretation Comments

 

             POCT GLU (test code = 4182092947) 318 mg/dL           H      

      

 

             Lab Interpretation (test code = Abnormal                           

    



             63387-0)                                            



[facility] Metabolic Panel (Na, K, Cl, CO2, 
Glucose, BUN, Creatinine, Ca)2022 22:41:00





             Test Item    Value        Reference Range Interpretation Comments

 

             NA (test code = 140 mmol/L   135-145                   



             7170918648)                                         

 

             K (test code = 3.1 mmol/L   3.5-5.0      L            



             5586064447)                                         

 

             CL (test code = 116 mmol/L          H            



             6023250949)                                         

 

             CO2 TOTAL (test code = 21 mmol/L    23-31        L            



             1594983681)                                         

 

             AGAP (test code =              2-16                      



             0464165558)                                         

 

             BUN (test code = 12 mg/dL     7-23                      



             2410355457)                                         

 

             GLUCOSE (test code = 281 mg/dL           H            



             5406872143)                                         

 

             CREATININE (test code = 0.62 mg/dL   0.60-1.25                 



             4080460543)                                         

 

             CALCIUM (test code = 7.2 mg/dL    8.6-10.6     L            



             0423941727)                                         

 

             eGFR (test code =              mL/min/1.73m2              



             8862896024)                                         

 

             MAL (test code = MAL) Association of                           



                          Glomerular Filtration                           



                          Rate (GFR) and Staging                           



                          of Kidney Disease*                           



                          +---------------------                           



                          --+-------------------                           



                          --+-------------------                           



                          ------+| GFR                           



                          (mL/min/1.73 m2) ?|                           



                          With Kidney Damage ?|                           



                          ?Without Kidney                           



                          Damage+---------------                           



                          --------+-------------                           



                          --------+-------------                           



                          ------------+| ?>90 ?                           



                          ? ? ? ? ? ? ? ?|                           



                          ?Stage one ? ? ? ? ?|                           



                          ? Normal ? ? ? ? ? ? ?                           



                          ?+--------------------                           



                          ---+------------------                           



                          ---+------------------                           



                          -------+| ?60-89 ? ? ?                           



                          ? ? ? ? ?| ?Stage two                           



                          ? ? ? ? ?| ? Decreased                           



                          GFR ? ? ? ?                            



                          +---------------------                           



                          --+-------------------                           



                          --+-------------------                           



                          ------+| ?30-59 ? ? ?                           



                          ? ? ? ? ?| ?Stage                           



                          three ? ? ? ?| ? Stage                           



                          three ? ? ? ? ?                           



                          +---------------------                           



                          --+-------------------                           



                          --+-------------------                           



                          ------+| ?15-29 ? ? ?                           



                          ? ? ? ? ?| ?Stage four                           



                          ? ? ? ? | ? Stage four                           



                          ? ? ? ? ?                              



                          ?+--------------------                           



                          ---+------------------                           



                          ---+------------------                           



                          -------+| ?<15 (or                           



                          dialysis) ? ?| ?Stage                           



                          five ? ? ? ? | ? Stage                           



                          five ? ? ? ? ?                           



                          ?+--------------------                           



                          ---+------------------                           



                          ---+------------------                           



                          -------+ *Each stage                           



                          assumes the associated                           



                          GFR level has been in                           



                          effect for at least                           



                          three months. ?Stages                           



                          1 to 5, with or                           



                          without kidney                           



                          disease, indicate                           



                          chronic kidney                           



                          disease. Notes:                           



                          Determination of                           



                          stages one and two                           



                          (with eGFR                             



                          >59mL/min/1.73 m2)                           



                          requires estimation of                           



                          kidney damage for at                           



                          least three months as                           



                          defined by structural                           



                          or functional                           



                          abnormalities of the                           



                          kidney, manifested by                           



                          either:Pathological                           



                          abnormalities or                           



                          Markers of kidney                           



                          damage (including                           



                          abnormalities in the                           



                          composition of the                           



                          blood or urine or                           



                          abnormalities in                           



                          imaging tests).                           

 

             Lab Interpretation Abnormal                               



             (test code = 29393-7)                                        



[facility] GLUCOSE (AUTOMATED)2022 21:53:29





             Test Item    Value        Reference Range Interpretation Comments

 

             POCT GLU (test code = 5717916370) 368 mg/dL           H      

      

 

             Lab Interpretation (test code = Abnormal                           

    



             45280-1)                                            



[facility] GLUCOSE (AUTOMATED)2022 20:41:51





             Test Item    Value        Reference Range Interpretation Comments

 

             POCT GLU (test code = 4667556069) 458 mg/dL           HH     

      

 

             Lab Interpretation (test code = Abnormal                           

    



             68315-7)                                            



[facility] GLUCOSE (AUTOMATED)2022 19:00:13





             Test Item    Value        Reference Range Interpretation Comments

 

             POCT GLU (test code = 3219111040) 369 mg/dL           H      

      

 

             Lab Interpretation (test code = Abnormal                           

    



             71977-6)                                            



[facility] (Na, K, Cl, CO2, 
Glucose, BUN, Creatinine, Ca)2022 18:32:29





             Test Item    Value        Reference Range Interpretation Comments

 

             NA (test code = 145 mmol/L   135-145                   



             1012606411)                                         

 

             K (test code = 4.8 mmol/L   3.5-5.0                   Slight



             7763496127)                                         hemolysis

 

             CL (test code = 112 mmol/L          H            



             6529886695)                                         

 

             CO2 TOTAL (test code 26 mmol/L    23-31                     



             = 2004032194)                                        

 

             AGAP (test code =              2-16                      



             9245845993)                                         

 

             BUN (test code = 16 mg/dL     7-23                      Slight



             9385979976)                                         hemolysis

 

             GLUCOSE (test code = 282 mg/dL           H            



             2264980259)                                         

 

             CREATININE (test code 0.83 mg/dL   0.60-1.25                 



             = 1220585827)                                        

 

             CALCIUM (test code = 10.0 mg/dL   8.6-10.6                  



             0821030308)                                         

 

             eGFR (test code =              mL/min/1.73m2              



             6047603620)                                         

 

             MAL (test code = MAL) Association of                           



                          Glomerular                             



                          Filtration Rate                           



                          (GFR) and Staging                           



                          of Kidney Disease*                           



                          +------------------                           



                          -----+-------------                           



                          --------+----------                           



                          ---------------+|                           



                          GFR (mL/min/1.73                           



                          m2) ?| With Kidney                           



                          Damage ?| ?Without                           



                          Kidney                                 



                          Damage+------------                           



                          -----------+-------                           



                          --------------+----                           



                          -------------------                           



                          --+| ?>90 ? ? ? ? ?                           



                          ? ? ? ?| ?Stage one                           



                          ? ? ? ? ?| ? Normal                           



                          ? ? ? ? ? ? ?                           



                          ?+-----------------                           



                          ------+------------                           



                          ---------+---------                           



                          ----------------+|                           



                          ?60-89 ? ? ? ? ? ?                           



                          ? ?| ?Stage two ? ?                           



                          ? ? ?| ? Decreased                           



                          GFR ? ? ? ?                            



                          +------------------                           



                          -----+-------------                           



                          --------+----------                           



                          ---------------+|                           



                          ?30-59 ? ? ? ? ? ?                           



                          ? ?| ?Stage three ?                           



                          ? ? ?| ? Stage                           



                          three ? ? ? ? ?                           



                          +------------------                           



                          -----+-------------                           



                          --------+----------                           



                          ---------------+|                           



                          ?15-29 ? ? ? ? ? ?                           



                          ? ?| ?Stage four ?                           



                          ? ? ? | ? Stage                           



                          four ? ? ? ? ?                           



                          ?+-----------------                           



                          ------+------------                           



                          ---------+---------                           



                          ----------------+|                           



                          ?<15 (or dialysis)                           



                          ? ?| ?Stage five ?                           



                          ? ? ? | ? Stage                           



                          five ? ? ? ? ?                           



                          ?+-----------------                           



                          ------+------------                           



                          ---------+---------                           



                          ----------------+                           



                          *Each stage assumes                           



                          the associated GFR                           



                          level has been in                           



                          effect for at least                           



                          three months.                           



                          ?Stages 1 to 5,                           



                          with or without                           



                          kidney disease,                           



                          indicate chronic                           



                          kidney disease.                           



                          Notes:                                 



                          Determination of                           



                          stages one and two                           



                          (with eGFR                             



                          >59mL/min/1.73 m2)                           



                          requires estimation                           



                          of kidney damage                           



                          for at least three                           



                          months as defined                           



                          by structural or                           



                          functional                             



                          abnormalities of                           



                          the kidney,                            



                          manifested by                           



                          either:Pathological                           



                          abnormalities or                           



                          Markers of kidney                           



                          damage (including                           



                          abnormalities in                           



                          the composition of                           



                          the blood or urine                           



                          or abnormalities in                           



                          imaging tests).                           

 

             Lab Interpretation Abnormal                               



             (test code = 42405-1)                                        



[facility] GLUCOSE (AUTOMATED)2022 17:45:09





             Test Item    Value        Reference Range Interpretation Comments

 

             POCT GLU (test code = 9354476578) 284 mg/dL           H      

      

 

             Lab Interpretation (test code = Abnormal                           

    



             60285-6)                                            



[facility] 16:41:13





             Test Item    Value        Reference Range Interpretation Comments

 

             BOH (test code = 0.9 mmol/L                             



             9202441600)                                         

 

             MAL (test code = Normal Ranges: ? ?                           



             MAL)         Nonfasting ? ? ? ? ? Less                           



                          than 0.1 mmol/L ? ?                           



                          Overnight Fast ? ? ? Less                           



                          than 0.4 mmol/L ? ? Fasting                           



                          (1-2 weeks) ?6-8 mmol/L Test                          

 



                          developed and                           



                          characteristics determined                           



                          by [facility].                          

 



[facility] GLUCOSE (AUTOMATED)2022 16:33:41





             Test Item    Value        Reference Range Interpretation Comments

 

             POCT GLU (test code = 5003780234) 266 mg/dL           H      

      

 

             Lab Interpretation (test code = Abnormal                           

    



             13622-5)                                            



[facility] GLUCOSE (AUTOMATED)2022 15:30:41





             Test Item    Value        Reference Range Interpretation Comments

 

             POCT GLU (test code = 8018834550) 294 mg/dL           H      

      

 

             Lab Interpretation (test code = Abnormal                           

    



             44246-0)                                            



[facility] GLUCOSE (AUTOMATED)2022 14:23:40





             Test Item    Value        Reference Range Interpretation Comments

 

             POCT GLU (test code = 3969926845) 511 mg/dL           HH     

      

 

             Lab Interpretation (test code = Abnormal                           

    



             05171-1)                                            



[id number] 13:55:53





             Test Item    Value        Reference Range Interpretation Comments

 

             WBC (test code = 6690-2)              See_Comment  H             [A

utomated message]



                                                                 The system [employer]



                                                                 generated this



                                                                 result transmit

huma



                                                                 reference range

:



                                                                 4.20 - 10.70



                                                                 10*3/?L. The



                                                                 reference range

 was



                                                                 not used to



                                                                 interpret this



                                                                 result as



                                                                 normal/abnormal

.

 

             RBC (test code = 789-8)              See_Comment  H             [Au

tomated message]



                                                                 The system [employer]



                                                                 generated this



                                                                 result transmit

huma



                                                                 reference range

:



                                                                 4.26 - 5.52 10*

6/?L.



                                                                 The reference r

zhen



                                                                 was not used to



                                                                 interpret this



                                                                 result as



                                                                 normal/abnormal

.

 

             HGB (test code = 718-7) 16.2 g/dL    12.2-16.4                 

 

             HCT (test code = 4544-3) 48.4 %       38.4-49.3                 

 

             MCH (test code = 785-6) 29.1 pg      26.1-32.7                 

 

             MCV (test code = 787-2) 86.9 fL      81.7-95.6                 

 

             MCHC (test code = 786-4) 33.5 g/dL    31.2-35.0                 

 

             PLT (test code = 777-3)              See_Comment  H             [Au

tomated message]



                                                                 The system [employer]



                                                                 generated this



                                                                 result transmit

huma



                                                                 reference range

: 150



                                                                 - 328 10*3/?L. 

The



                                                                 reference range

 was



                                                                 not used to



                                                                 interpret this



                                                                 result as



                                                                 normal/abnormal

.

 

             MPV (test code = 11.0 fL      9.8-13.0                  



             19186-2)                                            

 

             RDW-CV (test code = 14.2 %       12.1-15.4                 



             788-0)                                              

 

             RDW-SD (test code = 44.8 fL      38.5-51.6                 



             25661-7)                                            

 

             NRBC x10^3 (test code = <0.01        See_Comment                [Au

tomated message]



             1356351891)                                         The system [employer]



                                                                 generated this



                                                                 result transmit

huma



                                                                 reference range

:



                                                                 10*3/?L. The



                                                                 reference range

 was



                                                                 not used to



                                                                 interpret this



                                                                 result as



                                                                 normal/abnormal

.

 

             NRBC/100 WBC (test code              See_Comment                [Au

tomated message]



             = 4194672700)                                        The system [facility]



                                                                 generated this



                                                                 result transmit

huma



                                                                 reference range

: 0.0



                                                                 - 10.0 /100 WBC

s.



                                                                 The reference r

zhen



                                                                 was not used to



                                                                 interpret this



                                                                 result as



                                                                 normal/abnormal

.

 

             IPF % (test code =                                        



             5389404618)                                         

 

             Lab Interpretation (test Abnormal                               



             code = 78801-4)                                        



[facility] GLUCOSE (AUTOMATED)2022 13:34:21





             Test Item    Value        Reference Range Interpretation Comments

 

             POCT GLU (test code = 3490505393) 538 mg/dL           HH     

      

 

             Lab Interpretation (test code = Abnormal                           

    



             17630-5)                                            



[facility] GLUCOSE (AUTOMATED)2022 13:30:43





             Test Item    Value        Reference Range Interpretation Comments

 

             POCT GLU (test code = 8500064963) >600                HH     

      

 

             Lab Interpretation (test code = Abnormal                           

    



             43502-7)                                            



[facility] GLUCOSE (AUTOMATED)2022 13:30:43





             Test Item    Value        Reference Range Interpretation Comments

 

             POCT GLU (test code = 7907461060) >600                HH     

      

 

             Lab Interpretation (test code = Abnormal                           

    



             21016-0)                                            



[facility] GLUCOSE (AUTOMATED)2022 13:30:43





             Test Item    Value        Reference Range Interpretation Comments

 

             POCT GLU (test code = >600                HH           Notifi

ed Provider



             7237677181)                                         

 

             Lab Interpretation (test Abnormal                               



             code = 95286-3)                                        



[facility] GLUCOSE (AUTOMATED)2022 13:30:43





             Test Item    Value        Reference Range Interpretation Comments

 

             POCT GLU (test code = 2667784373) >600                HH     

      

 

             Lab Interpretation (test code = Abnormal                           

    



             75352-1)                                            



[facility] GLUCOSE (AUTOMATED)2022 13:30:38





             Test Item    Value        Reference Range Interpretation Comments

 

             POCT GLU (test code = 1702114973) >600                HH     

      

 

             Lab Interpretation (test code = Abnormal                           

    



             75249-1)                                            



[facility] GLUCOSE (AUTOMATED)2022 13:30:38





             Test Item    Value        Reference Range Interpretation Comments

 

             POCT GLU (test code = 0470052276) >600                HH     

      

 

             Lab Interpretation (test code = Abnormal                           

    



             72456-5)                                            



[facility]sm[id number] 12:37:44





             Test Item    Value        Reference Range Interpretation Comments

 

             OSMOLALITY (test code =              See_Comment  HH            [Au

tomated message]



             4102-2)                                             The system [employer]



                                                                 generated this 

result



                                                                 transmitted ref

erence



                                                                 range: 278 - 30

5



                                                                 mOsm/kg. The



                                                                 reference range

 was



                                                                 not used to int

erpret



                                                                 this result as



                                                                 normal/abnormal

.

 

             Lab Interpretation (test Abnormal                               



             code = 35555-2)                                        



[facility] Metabolic Panel (Na, K, Cl, CO2, 
Glucose, BUN, Creatinine, Ca)2022 12:23:35





             Test Item    Value        Reference Range Interpretation Comments

 

             NA (test code = 145 mmol/L   135-145                   



             6026253307)                                         

 

             K (test code = 4.4 mmol/L   3.5-5.0                   



             0878325408)                                         

 

             CL (test code = 109 mmol/L          H            



             1084381885)                                         

 

             CO2 TOTAL (test code = 21 mmol/L    23-31        L            



             9034324803)                                         

 

             AGAP (test code =              2-16                      



             2207368920)                                         

 

             BUN (test code = 15 mg/dL     7-23                      



             6312811055)                                         

 

             GLUCOSE (test code = 753 mg/dL           HH           



             7321360898)                                         

 

             CREATININE (test code = 0.79 mg/dL   0.60-1.25                 



             8721308397)                                         

 

             CALCIUM (test code = 10.9 mg/dL   8.6-10.6     H            



             6388483794)                                         

 

             eGFR (test code =              mL/min/1.73m2              



             [id number])                                         

 

             MAL (test code = MAL) Association of                           



                          Glomerular Filtration                           



                          Rate (GFR) and Staging                           



                          of Kidney Disease*                           



                          +---------------------                           



                          --+-------------------                           



                          --+-------------------                           



                          ------+| GFR                           



                          (mL/min/1.73 m2) ?|                           



                          With Kidney Damage ?|                           



                          ?Without Kidney                           



                          Damage+---------------                           



                          --------+-------------                           



                          --------+-------------                           



                          ------------+| ?>90 ?                           



                          ? ? ? ? ? ? ? ?|                           



                          ?Stage one ? ? ? ? ?|                           



                          ? Normal ? ? ? ? ? ? ?                           



                          ?+--------------------                           



                          ---+------------------                           



                          ---+------------------                           



                          -------+| ?60-89 ? ? ?                           



                          ? ? ? ? ?| ?Stage two                           



                          ? ? ? ? ?| ? Decreased                           



                          GFR ? ? ? ?                            



                          +---------------------                           



                          --+-------------------                           



                          --+-------------------                           



                          ------+| ?30-59 ? ? ?                           



                          ? ? ? ? ?| ?Stage                           



                          three ? ? ? ?| ? Stage                           



                          three ? ? ? ? ?                           



                          +---------------------                           



                          --+-------------------                           



                          --+-------------------                           



                          ------+| ?15-29 ? ? ?                           



                          ? ? ? ? ?| ?Stage four                           



                          ? ? ? ? | ? Stage four                           



                          ? ? ? ? ?                              



                          ?+--------------------                           



                          ---+------------------                           



                          ---+------------------                           



                          -------+| ?<15 (or                           



                          dialysis) ? ?| ?Stage                           



                          five ? ? ? ? | ? Stage                           



                          five ? ? ? ? ?                           



                          ?+--------------------                           



                          ---+------------------                           



                          ---+------------------                           



                          -------+ *Each stage                           



                          assumes the associated                           



                          GFR level has been in                           



                          effect for at least                           



                          three months. ?Stages                           



                          1 to 5, with or                           



                          without kidney                           



                          disease, indicate                           



                          chronic kidney                           



                          disease. Notes:                           



                          Determination of                           



                          stages one and two                           



                          (with eGFR                             



                          >59mL/min/1.73 m2)                           



                          requires estimation of                           



                          kidney damage for at                           



                          least three months as                           



                          defined by structural                           



                          or functional                           



                          abnormalities of the                           



                          kidney, manifested by                           



                          either:Pathological                           



                          abnormalities or                           



                          Markers of kidney                           



                          damage (including                           



                          abnormalities in the                           



                          composition of the                           



                          blood or urine or                           



                          abnormalities in                           



                          imaging tests).                           

 

             Lab Interpretation Abnormal                               



             (test code = 29486-4)                                        



[facility]





             Test Item    Value        Reference Range Interpretation Comments

 

             POCT GLU (test code = 9012421531) 564 mg/dL           HH     

      

 

             Lab Interpretation (test code = Abnormal                           

    



             59933-6)                                            



[id number] 12:05:40





             Test Item    Value        Reference Range Interpretation Comments

 

             MAGNESIUM (test code = 3676312200) 2.5 mg/dL    1.7-2.4      H     

       

 

             Lab Interpretation (test code = Abnormal                           

    



             57122-8)                                            



[facility] 12:05:20





             Test Item    Value        Reference Range Interpretation Comments

 

             MAGNESIUM (test code = 9224457448) 2.5 mg/dL    1.7-2.4      H     

       

 

             Lab Interpretation (test code = Abnormal                           

    



             36023-9)                                            



[facility] [id number] 12:05:20





             Test Item    Value        Reference Range Interpretation Comments

 

             PHOSPHORUS (test code = 1316198857) 6.2 mg/dL    2.5-5.0      H    

        

 

             Lab Interpretation (test code = Abnormal                           

    



             72957-3)                                            



[facility] LACTIC [id number] 05:53:48





             Test Item    Value        Reference Range Interpretation Comments

 

             PH (test code =              7.32-7.42                 



             [id number])                                         

 

             PCO2 MARCELINA (test code              See_Comment                [Automa

huma



             = 8633292588)                                        message] The



                                                                 system which



                                                                 generated this



                                                                 result



                                                                 transmitted



                                                                 reference range

:



                                                                 41 - 51 mmHg. T

he



                                                                 reference range



                                                                 was not used to



                                                                 interpret this



                                                                 result as



                                                                 normal/abnormal

.

 

             PO2 MARCELINA (test code =              See_Comment  HH            [Autom

ated



             7019105994)                                         message] The



                                                                 system which



                                                                 generated this



                                                                 result



                                                                 transmitted



                                                                 reference range

:



                                                                 25 - 40 mmHg. T

he



                                                                 reference range



                                                                 was not used to



                                                                 interpret this



                                                                 result as



                                                                 normal/abnormal

.

 

             HCO3 MARCELINA (test code              See_Comment  L             [Automa

huma



             = 3109509542)                                        message] The



                                                                 system which



                                                                 generated this



                                                                 result



                                                                 transmitted



                                                                 reference range

:



                                                                 24 - 28 mEq/L.



                                                                 The reference



                                                                 range was not



                                                                 used to interpr

et



                                                                 this result as



                                                                 normal/abnormal

.

 

             AC VBE(BEAKER) (test              mEq/L                     



             code = 8398464783)                                        

 

             THB MARCELINA (test code = 17.7 g/dL    13.5-18.0                 



             8120645979)                                         

 

             %O2HB MARCELINA (test code 93.8 %       52.0-63.0    H            



             = 5675432720)                                        

 

             %COHB MARCELINA (test code 2.1 %        0.0-1.5      H            



             = 7971930727)                                        

 

             %METHB MARCELINA (test 0.1 %        0.4-1.5      L            



             code = 2494214761)                                        

 

             VOL%O2 MARCELINA (test 23.3 %       6.0-12.0     H            



             code = 9323057070)                                        

 

             NA (test code = 142 mmol/L   135-145                   



             9086300139)                                         

 

             K+ (test code = 4.4 mmol/L   3.5-5.0                   



             7100903753)                                         

 

             AC CA IONZ (test 5.40 mg/dL   4.50-5.30    H            



             code = 6694232274)                                        

 

             GLUCOSE (test code = <20                 LL           



             4248483853)                                         

 

             LACTIC ACID (test 3.37 mmol/L  0.50-2.20    H            



             code = 4413989072)                                        

 

             MAL (test code = *ac gluc                               



             MAL)         unmeasurable                           

 

             Lab Interpretation Abnormal                               



             (test code =                                        



             27458-1)                                            



[facility] MET[facility]OLIC PAN[facility] (05292)2022 
04:59:53





             Test Item    Value        Reference Range Interpretation Comments

 

             NA (test code = 140 mmol/L   135-145                   



             7405487950)                                         

 

             K (test code = 4.8 mmol/L   3.5-5.0                   



             3912442040)                                         

 

             CL (test code = 100 mmol/L                       



             5736676743)                                         

 

             CO2 TOTAL (test code = 20 mmol/L    23-31        L            



             3666630151)                                         

 

             AGAP (test code =              2-16         H            



             9587128998)                                         

 

             BUN (test code = 14 mg/dL     7-23                      



             3138623517)                                         

 

             GLUCOSE (test code = 1083 mg/dL          HH           



             5994316201)                                         

 

             CREATININE (test code = 0.80 mg/dL   0.60-1.25                 



             1733472235)                                         

 

             TOTAL BILI (test code = 1.0 mg/dL    0.1-1.1                   



             9151207821)                                         

 

             CALCIUM (test code = 12.0 mg/dL   8.6-10.6     H            



             0431763915)                                         

 

             T PROTEIN (test code = 8.1 g/dL     6.3-8.2                   



             2454847319)                                         

 

             ALBUMIN (test code = 4.7 g/dL     3.5-5.0                   



             6606511420)                                         

 

             ALK PHOS (test code = 173 U/L             H            



             9292585312)                                         

 

             ALTv (test code = 36 U/L       5-50                      



             1742-6)                                             

 

             AST(SGOT) (test code = 18 U/L       13-40                     



             0485991466)                                         

 

             eGFR (test code =              mL/min/1.73m2              



             5559638216)                                         

 

             MAL (test code = MAL) Association of                           



                          Glomerular Filtration                           



                          Rate (GFR) and Staging                           



                          of Kidney Disease*                           



                          +---------------------                           



                          --+-------------------                           



                          --+-------------------                           



                          ------+| GFR                           



                          (mL/min/1.73 m2) ?|                           



                          With Kidney Damage ?|                           



                          ?Without Kidney                           



                          Damage+---------------                           



                          --------+-------------                           



                          --------+-------------                           



                          ------------+| ?>90 ?                           



                          ? ? ? ? ? ? ? ?|                           



                          ?Stage one ? ? ? ? ?|                           



                          ? Normal ? ? ? ? ? ? ?                           



                          ?+--------------------                           



                          ---+------------------                           



                          ---+------------------                           



                          -------+| ?60-89 ? ? ?                           



                          ? ? ? ? ?| ?Stage two                           



                          ? ? ? ? ?| ? Decreased                           



                          GFR ? ? ? ?                            



                          +---------------------                           



                          --+-------------------                           



                          --+-------------------                           



                          ------+| ?30-59 ? ? ?                           



                          ? ? ? ? ?| ?Stage                           



                          three ? ? ? ?| ? Stage                           



                          three ? ? ? ? ?                           



                          +---------------------                           



                          --+-------------------                           



                          --+-------------------                           



                          ------+| ?15-29 ? ? ?                           



                          ? ? ? ? ?| ?Stage four                           



                          ? ? ? ? | ? Stage four                           



                          ? ? ? ? ?                              



                          ?+--------------------                           



                          ---+------------------                           



                          ---+------------------                           



                          -------+| ?<15 (or                           



                          dialysis) ? ?| ?Stage                           



                          five ? ? ? ? | ? Stage                           



                          five ? ? ? ? ?                           



                          ?+--------------------                           



                          ---+------------------                           



                          ---+------------------                           



                          -------+ *Each stage                           



                          assumes the associated                           



                          GFR level has been in                           



                          effect for at least                           



                          three months. ?Stages                           



                          1 to 5, with or                           



                          without kidney                           



                          disease, indicate                           



                          chronic kidney                           



                          disease. Notes:                           



                          Determination of                           



                          stages one and two                           



                          (with eGFR                             



                          >59mL/min/1.73 m2)                           



                          requires estimation of                           



                          kidney damage for at                           



                          least three months as                           



                          defined by structural                           



                          or functional                           



                          abnormalities of the                           



                          kidney, manifested by                           



                          either:Pathological                           



                          abnormalities or                           



                          Markers of kidney                           



                          damage (including                           



                          abnormalities in the                           



                          composition of the                           



                          blood or urine or                           



                          abnormalities in                           



                          imaging tests).                           

 

             Lab Interpretation Abnormal                               



             (test code = 88969-2)                                        



[facility]51[facility]43





             Test Item    Value        Reference    Interpretation Comments



                                       Range                     

 

             TROPONIN I (test <0.012       See_Comment                [Automated



             code = 3644998828)                                        message] 

The



                                                                 system which



                                                                 generated this



                                                                 result



                                                                 transmitted



                                                                 reference range

:



                                                                 <=0.034 ng/mL.



                                                                 The reference



                                                                 range was not



                                                                 used to



                                                                 interpret this



                                                                 result as



                                                                 normal/abnormal

.

 

             MAL (test code = Reference (Normal)                           



             MAL)         Range (defined by                           



                          the 99th percentile                           



                          reference limit): <=                           



                          0.034 ng/mL Note:                           



                          Cardiac troponin                           



                          begins to rise 3-4                           



                          hours after the                           



                          onset of ischemia.                           



                          Repeat in 4-6 hours                           



                          if the sample was                           



                          drawn within 3-4                           



                          hours of the onset                           



                          of the symptom and                           



                          found normal.                           



                          Diagnosis of                           



                          myocardial injury is                           



                          made with acute                           



                          changes in cTn                           



                          concentrations with                           



                          at least one serial                           



                          sample above the                           



                          99th percentile                           



                          upper reference                           



                          limit (URL), taken                           



                          together with the                           



                          patient's clinical                           



                          presentation. Biotin                           



                          has been reported to                           



                          cause a negative                           



                          bias, interpret                           



                          results relative to                           



                          patient's use of                           



                          biotin.                                

 

             Lab Interpretation Normal                                 



             (test code =                                        



             36667-9)                                            



[id number]23





             Test Item    Value        Reference Range Interpretation Comments

 

             NT-proBNP (test code 71 pg/mL     See_Comment                [Autom

ated



             = [id number])                                        message] The



                                                                 system which



                                                                 generated this



                                                                 result



                                                                 transmitted



                                                                 reference range

:



                                                                 <=125. The



                                                                 reference range



                                                                 was not used to



                                                                 interpret this



                                                                 result as



                                                                 normal/abnormal

.

 

             MAL (test code = MAL) Biotin has been                           



                          reported to                            



                          cause a negative                           



                          bias, interpret                           



                          results relative                           



                          to patient's use                           



                          of biotin.                             

 

             Lab Interpretation Normal                                 



             (test code = 37659-9)                                        



[id number] 03:48:23





             Test Item    Value        Reference Range Interpretation Comments

 

             NT-proBNP (test code 71 pg/mL     See_Comment                [Autom

ated



             = 7684697039)                                        message] The



                                                                 system which



                                                                 generated this



                                                                 result



                                                                 transmitted



                                                                 reference range

:



                                                                 <=125. The



                                                                 reference range



                                                                 was not used to



                                                                 interpret this



                                                                 result as



                                                                 normal/abnormal

.

 

             MAL (test code = MAL) Biotin has been                           



                          reported to                            



                          cause a negative                           



                          bias, interpret                           



                          results relative                           



                          to patient's use                           



                          of biotin.                             

 

             Lab Interpretation Normal                                 



             (test code = 39846-8)                                        



[id number]





             Test Item    Value        Reference Range Interpretation Comments

 

             LIPASE (test code = 2955849602) 120 U/L      0-220                 

    

 

             Lab Interpretation (test code = Normal                             

    



             10488-0)                                            



[facility] [id number] 
03:38:23





             Test Item    Value        Reference Range Interpretation Comments

 

             APTT Patient (test              See_Comment                [Automat

ed



             code = 3173-2)                                        message] The



                                                                 system which



                                                                 generated this



                                                                 result



                                                                 transmitted



                                                                 reference range

:



                                                                 23 - 38 Seconds

.



                                                                 The reference



                                                                 range was not



                                                                 used to interpr

et



                                                                 this result as



                                                                 normal/abnormal

.

 

             MAL (test code = MAL) The [facility] patient                           



                          population mean                           



                          normal value for                           



                          aPTT is 30                             



                          seconds.                               

 

             Lab Interpretation Normal                                 



             (test code = 05625-5)                                        



[facility] [id number] 
03:38:23





             Test Item    Value        Reference Range Interpretation Comments

 

             APTT Patient (test              See_Comment                [Automat

ed



             code = 3173-2)                                        message] The



                                                                 system which



                                                                 generated this



                                                                 result



                                                                 transmitted



                                                                 reference range

:



                                                                 23 - 38 Seconds

.



                                                                 The reference



                                                                 range was not



                                                                 used to interpr

et



                                                                 this result as



                                                                 normal/abnormal

.

 

             MAL (test code = MAL) The [facility] patient                           



                          population mean                           



                          normal value for                           



                          aPTT is 30                             



                          seconds.                               

 

             Lab Interpretation Normal                                 



             (test code = 64386-7)                                        



[facility] [id number] 03:36:22





             Test Item    Value        Reference Range Interpretation Comments

 

             PROTIME PATIENT (test              See_Comment                [Auto

mated message]



             code = 5964-2)                                        The system wh

ich



                                                                 generated this 

result



                                                                 transmitted ref

erence



                                                                 range: 12.0 - 1

4.7



                                                                 Seconds. The re

ference



                                                                 range was not u

sed to



                                                                 interpret this 

result



                                                                 as normal/abnor

mal.

 

             INR (test code = 6301-6)                                        Nor

mal INR <1.1;



                                                                 Warfarin Therap

eutic



                                                                 range 2.0 to 3.

0 or



                                                                 2.5 to 3.5, dep

ending



                                                                 upon the indica

tions.

 

             Lab Interpretation (test Normal                                 



             code = 73638-8)                                        



[facility] [id number] 03:35:42





             Test Item    Value        Reference Range Interpretation Comments

 

             WBC (test code =              See_Comment  H             [Automated



             9490-2)                                             message] The



                                                                 system which



                                                                 generated this



                                                                 result transmit

huma



                                                                 reference range

:



                                                                 4.20 - 10.70



                                                                 10*3/?L. The



                                                                 reference range



                                                                 was not used to



                                                                 interpret this



                                                                 result as



                                                                 normal/abnormal

.

 

             RBC (test code =              See_Comment  H             [Automated



             789-8)                                              message] The



                                                                 system which



                                                                 generated this



                                                                 result transmit

huma



                                                                 reference range

:



                                                                 4.26 - 5.52



                                                                 10*6/?L. The



                                                                 reference range



                                                                 was not used to



                                                                 interpret this



                                                                 result as



                                                                 normal/abnormal

.

 

             HGB (test code = 17.8 g/dL    12.2-16.4    H            



             718-7)                                              

 

             HCT (test code = 51.7 %       38.4-49.3    H            



             4544-3)                                             

 

             MCV (test code = 86.9 fL      81.7-95.6                 



             787-2)                                              

 

             MCH (test code = 29.9 pg      26.1-32.7                 



             785-6)                                              

 

             MCHC (test code = 34.4 g/dL    31.2-35.0                 



             786-4)                                              

 

             RDW-SD (test code = 44.7 fL      38.5-51.6                 



             11501-2)                                            

 

             RDW-CV (test code = 14.2 %       12.1-15.4                 



             788-0)                                              

 

             PLT (test code =              See_Comment  H             [Automated



             777-3)                                              message] The



                                                                 system which



                                                                 generated this



                                                                 result transmit

huma



                                                                 reference range

:



                                                                 150 - 328 10*3/

?L.



                                                                 The reference



                                                                 range was not u

sed



                                                                 to interpret th

is



                                                                 result as



                                                                 normal/abnormal

.

 

             MPV (test code = 11.5 fL      9.8-13.0                  



             62180-2)                                            

 

             NRBC/100 WBC (test              See_Comment                [Automat

ed



             code = 1523960260)                                        message] 

The



                                                                 system which



                                                                 generated this



                                                                 result transmit

huma



                                                                 reference range

:



                                                                 0.0 - 10.0 /100



                                                                 WBCs. The



                                                                 reference range



                                                                 was not used to



                                                                 interpret this



                                                                 result as



                                                                 normal/abnormal

.

 

             NRBC x10^3 (test code <0.01        See_Comment                [Auto

mated



             = 3688592747)                                        message] The



                                                                 system which



                                                                 generated this



                                                                 result transmit

huma



                                                                 reference range

:



                                                                 10*3/?L. The



                                                                 reference range



                                                                 was not used to



                                                                 interpret this



                                                                 result as



                                                                 normal/abnormal

.

 

             GRAN MAT (NEUT) % 85.2 %                                 



             (test code = 770-8)                                        

 

             IMM GRAN % (test code 0.90 %                                 



             = 1424656669)                                        

 

             LYMPH % (test code = 5.9 %                                  



             736-9)                                              

 

             MONO % (test code = 7.8 %                                  



             5905-5)                                             

 

             EOS % (test code = 0.0 %                                  



             713-8)                                              

 

             BASO % (test code = 0.2 %                                  



             706-2)                                              

 

             GRAN MAT x10^3(ANC) 12.85 10*3/uL 1.99-6.95    H            



             (test code =                                        



             1117024463)                                         

 

             IMM GRAN x10^3 (test 0.13 10*3/uL 0.00-0.06    H            



             code = 0819973686)                                        

 

             LYMPH x10^3 (test code 0.89 10*3/uL 1.09-3.23    L            



             = 731-0)                                            

 

             MONO x10^3 (test code 1.17 10*3/uL 0.36-1.02    H            



             = 742-7)                                            

 

             EOS x10^3 (test code = <0.03        0.06-0.53    L            



             711-2)                                              

 

             BASO x10^3 (test code 0.03 10*3/uL 0.01-0.09                 



             = 704-7)                                            

 

             Lab Interpretation Abnormal                               



             (test code = 68134-6)                                        



[facility] Hospital[facility] Whole [id number] 03:22:23





             Test Item    Value        Reference Range Interpretation Comments

 

             LACTIC ACID (test code = 4.52 mmol/L  0.50-2.20    H            



             7221299649)                                         

 

             Lab Interpretation (test code = Abnormal                           

    



             77358-3)                                            



[facility] (NA, K, CL, CO2, 
GLUCOSE, BUN, CREATININE, CA)2022 11:13:09





             Test Item    Value        Reference Range Interpretation Comments

 

             NA (test code = 137 mmol/L   135-145                   



             8251905459)                                         

 

             K (test code = 4.8 mmol/L   3.5-5.0                   



             8155895261)                                         

 

             CL (test code = 101 mmol/L                       



             7219073048)                                         

 

             CO2 TOTAL (test code = 24 mmol/L    23-31                     



             9430928920)                                         

 

             AGAP (test code =              2-16                      



             6661340163)                                         

 

             BUN (test code = 17 mg/dL     7-23                      



             2176616635)                                         

 

             GLUCOSE (test code = 323 mg/dL           H            



             9426814246)                                         

 

             CREATININE (test code = 0.56 mg/dL   0.60-1.25    L            



             3704948281)                                         

 

             CALCIUM (test code = 9.6 mg/dL    8.6-10.6                  



             5069653601)                                         

 

             eGFR (test code =              mL/min/1.73m2              



             0291122978)                                         

 

             MAL (test code = MAL) Association of                           



                          Glomerular Filtration                           



                          Rate (GFR) and Staging                           



                          of Kidney Disease*                           



                          +---------------------                           



                          --+-------------------                           



                          --+-------------------                           



                          ------+| GFR                           



                          (mL/min/1.73 m2) ?|                           



                          With Kidney Damage ?|                           



                          ?Without Kidney                           



                          Damage+---------------                           



                          --------+-------------                           



                          --------+-------------                           



                          ------------+| ?>90 ?                           



                          ? ? ? ? ? ? ? ?|                           



                          ?Stage one ? ? ? ? ?|                           



                          ? Normal ? ? ? ? ? ? ?                           



                          ?+--------------------                           



                          ---+------------------                           



                          ---+------------------                           



                          -------+| ?60-89 ? ? ?                           



                          ? ? ? ? ?| ?Stage two                           



                          ? ? ? ? ?| ? Decreased                           



                          GFR ? ? ? ?                            



                          +---------------------                           



                          --+-------------------                           



                          --+-------------------                           



                          ------+| ?30-59 ? ? ?                           



                          ? ? ? ? ?| ?Stage                           



                          three ? ? ? ?| ? Stage                           



                          three ? ? ? ? ?                           



                          +---------------------                           



                          --+-------------------                           



                          --+-------------------                           



                          ------+| ?15-29 ? ? ?                           



                          ? ? ? ? ?| ?Stage four                           



                          ? ? ? ? | ? Stage four                           



                          ? ? ? ? ?                              



                          ?+--------------------                           



                          ---+------------------                           



                          ---+------------------                           



                          -------+| ?<15 (or                           



                          dialysis) ? ?| ?Stage                           



                          five ? ? ? ? | ? Stage                           



                          five ? ? ? ? ?                           



                          ?+--------------------                           



                          ---+------------------                           



                          ---+------------------                           



                          -------+ *Each stage                           



                          assumes the associated                           



                          GFR level has been in                           



                          effect for at least                           



                          three months. ?Stages                           



                          1 to 5, with or                           



                          without kidney                           



                          disease, indicate                           



                          chronic kidney                           



                          disease. Notes:                           



                          Determination of                           



                          stages one and two                           



                          (with eGFR                             



                          >59mL/min/1.73 m2)                           



                          requires estimation of                           



                          kidney damage for at                           



                          least three months as                           



                          defined by structural                           



                          or functional                           



                          abnormalities of the                           



                          kidney, manifested by                           



                          either:Pathological                           



                          abnormalities or                           



                          Markers of kidney                           



                          damage (including                           



                          abnormalities in the                           



                          composition of the                           



                          blood or urine or                           



                          abnormalities in                           



                          imaging tests).                           

 

             Lab Interpretation Abnormal                               



             (test code = 94550-3)                                        



[facility] WITH [id number] 10:49:07





             Test Item    Value        Reference Range Interpretation Comments

 

             WBC (test code =              See_Comment  H             [Automated



             2898-2)                                             message] The sy

stem



                                                                 which generated



                                                                 this result



                                                                 transmitted



                                                                 reference range

:



                                                                 4.20 - 10.70



                                                                 10*3/?L. The



                                                                 reference range

 was



                                                                 not used to



                                                                 interpret this



                                                                 result as



                                                                 normal/abnormal

.

 

             RBC (test code =              See_Comment                [Automated



             089-8)                                              message] The sy

stem



                                                                 which generated



                                                                 this result



                                                                 transmitted



                                                                 reference range

:



                                                                 4.26 - 5.52



                                                                 10*6/?L. The



                                                                 reference range

 was



                                                                 not used to



                                                                 interpret this



                                                                 result as



                                                                 normal/abnormal

.

 

             HGB (test code = 15.6 g/dL    12.2-16.4                 



             718-7)                                              

 

             HCT (test code = 46.6 %       38.4-49.3                 



             4544-3)                                             

 

             MCV (test code = 88.3 fL      81.7-95.6                 



             787-2)                                              

 

             MCH (test code = 29.5 pg      26.1-32.7                 



             785-6)                                              

 

             MCHC (test code = 33.5 g/dL    31.2-35.0                 



             786-4)                                              

 

             RDW-SD (test code = 46.7 fL      38.5-51.6                 



             13637-4)                                            

 

             RDW-CV (test code = 14.5 %       12.1-15.4                 



             788-0)                                              

 

             PLT (test code =              See_Comment  H             [Automated



             777-3)                                              message] The sy

stem



                                                                 which generated



                                                                 this result



                                                                 transmitted



                                                                 reference range

:



                                                                 150 - 328 10*3/

?L.



                                                                 The reference r

zhen



                                                                 was not used to



                                                                 interpret this



                                                                 result as



                                                                 normal/abnormal

.

 

             MPV (test code = 10.2 fL      9.8-13.0                  



             83203-3)                                            

 

             NRBC/100 WBC (test              See_Comment                [Automat

ed



             code = [id number])                                        message] 

The system



                                                                 which generated



                                                                 this result



                                                                 transmitted



                                                                 reference range

:



                                                                 0.0 - 10.0 /100



                                                                 WBCs. The refer

ence



                                                                 range was not u

sed



                                                                 to interpret th

is



                                                                 result as



                                                                 normal/abnormal

.

 

             NRBC x10^3 (test code <0.01        See_Comment                [Auto

mated



             = [id number])                                        message] The s

ystem



                                                                 which generated



                                                                 this result



                                                                 transmitted



                                                                 reference range

:



                                                                 10*3/?L. The



                                                                 reference range

 was



                                                                 not used to



                                                                 interpret this



                                                                 result as



                                                                 normal/abnormal

.

 

             GRAN MAT (NEUT) % 70.2 %                                 



             (test code = 770-8)                                        

 

             IMM GRAN % (test code 0.80 %                                 



             = [id number])                                        

 

             LYMPH % (test code = 18.0 %                                 



             736-9)                                              

 

             MONO % (test code = 8.5 %                                  



             5905-5)                                             

 

             EOS % (test code = 2.1 %                                  



             713-8)                                              

 

             BASO % (test code = 0.4 %                                  



             706-2)                                              

 

             GRAN MAT x10^3(ANC) 7.63 10*3/uL 1.99-6.95    H            



             (test code =                                        



             [id number])                                         

 

             IMM GRAN x10^3 (test 0.09 10*3/uL 0.00-0.06    H            



             code = [id number])                                        

 

             LYMPH x10^3 (test code 1.96 10*3/uL 1.09-3.23                 



             = 731-0)                                            

 

             MONO x10^3 (test code 0.92 10*3/uL 0.36-1.02                 



             = 742-7)                                            

 

             EOS x10^3 (test code = 0.23 10*3/uL 0.06-0.53                 



             711-2)                                              

 

             BASO x10^3 (test code 0.04 10*3/uL 0.01-0.09                 



             = 704-7)                                            

 

             Lab Interpretation Abnormal                               



             (test code = 18298-7)                                        



[facility] METABOLIC PANEL (NA, K, CL, CO2, 
GLUCOSE, BUN, CREATININE, CA)2022 09:48:58





             Test Item    Value        Reference Range Interpretation Comments

 

             NA (test code = 135 mmol/L   135-145                   



             [id number])                                         

 

             K (test code = 4.7 mmol/L   3.5-5.0                   



             3450940155)                                         

 

             CL (test code = 101 mmol/L                       



             4987038760)                                         

 

             CO2 TOTAL (test code = 23 mmol/L    23-31                     



             6943858191)                                         

 

             AGAP (test code =              2-16                      



             7645976133)                                         

 

             BUN (test code = 18 mg/dL     7-23                      



             4767528487)                                         

 

             GLUCOSE (test code = 346 mg/dL           H            



             6136579521)                                         

 

             CREATININE (test code = 0.64 mg/dL   0.60-1.25                 



             5615281584)                                         

 

             CALCIUM (test code = 9.1 mg/dL    8.6-10.6                  



             1038655513)                                         

 

             eGFR (test code =              mL/min/1.73m2              



             8126112459)                                         

 

             MAL (test code = MAL) Association of                           



                          Glomerular Filtration                           



                          Rate (GFR) and Staging                           



                          of Kidney Disease*                           



                          +---------------------                           



                          --+-------------------                           



                          --+-------------------                           



                          ------+| GFR                           



                          (mL/min/1.73 m2) ?|                           



                          With Kidney Damage ?|                           



                          ?Without Kidney                           



                          Damage+---------------                           



                          --------+-------------                           



                          --------+-------------                           



                          ------------+| ?>90 ?                           



                          ? ? ? ? ? ? ? ?|                           



                          ?Stage one ? ? ? ? ?|                           



                          ? Normal ? ? ? ? ? ? ?                           



                          ?+--------------------                           



                          ---+------------------                           



                          ---+------------------                           



                          -------+| ?60-89 ? ? ?                           



                          ? ? ? ? ?| ?Stage two                           



                          ? ? ? ? ?| ? Decreased                           



                          GFR ? ? ? ?                            



                          +---------------------                           



                          --+-------------------                           



                          --+-------------------                           



                          ------+| ?30-59 ? ? ?                           



                          ? ? ? ? ?| ?Stage                           



                          three ? ? ? ?| ? Stage                           



                          three ? ? ? ? ?                           



                          +---------------------                           



                          --+-------------------                           



                          --+-------------------                           



                          ------+| ?15-29 ? ? ?                           



                          ? ? ? ? ?| ?Stage four                           



                          ? ? ? ? | ? Stage four                           



                          ? ? ? ? ?                              



                          ?+--------------------                           



                          ---+------------------                           



                          ---+------------------                           



                          -------+| ?<15 (or                           



                          dialysis) ? ?| ?Stage                           



                          five ? ? ? ? | ? Stage                           



                          five ? ? ? ? ?                           



                          ?+--------------------                           



                          ---+------------------                           



                          ---+------------------                           



                          -------+ *Each stage                           



                          assumes the associated                           



                          GFR level has been in                           



                          effect for at least                           



                          three months. ?Stages                           



                          1 to 5, with or                           



                          without kidney                           



                          disease, indicate                           



                          chronic kidney                           



                          disease. Notes:                           



                          Determination of                           



                          stages one and two                           



                          (with eGFR                             



                          >59mL/min/1.73 m2)                           



                          requires estimation of                           



                          kidney damage for at                           



                          least three months as                           



                          defined by structural                           



                          or functional                           



                          abnormalities of the                           



                          kidney, manifested by                           



                          either:Pathological                           



                          abnormalities or                           



                          Markers of kidney                           



                          damage (including                           



                          abnormalities in the                           



                          composition of the                           



                          blood or urine or                           



                          abnormalities in                           



                          imaging tests).                           

 

             Lab Interpretation Abnormal                               



             (test code = 63077-9)                                        



[facility] WITH [id number] 09:06:55





             Test Item    Value        Reference Range Interpretation Comments

 

             WBC (test code =              See_Comment                [Automated



             8762-2)                                             message] The sy

stem



                                                                 which generated



                                                                 this result



                                                                 transmitted



                                                                 reference range

:



                                                                 4.20 - 10.70



                                                                 10*3/?L. The



                                                                 reference range

 was



                                                                 not used to



                                                                 interpret this



                                                                 result as



                                                                 normal/abnormal

.

 

             RBC (test code =              See_Comment                [Automated



             485-8)                                              message] The sy

stem



                                                                 which generated



                                                                 this result



                                                                 transmitted



                                                                 reference range

:



                                                                 4.26 - 5.52



                                                                 10*6/?L. The



                                                                 reference range

 was



                                                                 not used to



                                                                 interpret this



                                                                 result as



                                                                 normal/abnormal

.

 

             HGB (test code = 15.0 g/dL    12.2-16.4                 



             718-7)                                              

 

             HCT (test code = 44.8 %       38.4-49.3                 



             4544-3)                                             

 

             MCV (test code = 88.2 fL      81.7-95.6                 



             787-2)                                              

 

             MCH (test code = 29.5 pg      26.1-32.7                 



             785-6)                                              

 

             MCHC (test code = 33.5 g/dL    31.2-35.0                 



             786-4)                                              

 

             RDW-SD (test code = 47.7 fL      38.5-51.6                 



             49773-0)                                            

 

             RDW-CV (test code = 14.6 %       12.1-15.4                 



             788-0)                                              

 

             PLT (test code =              See_Comment                [Automated



             257-3)                                              message] The sy

stem



                                                                 which generated



                                                                 this result



                                                                 transmitted



                                                                 reference range

:



                                                                 150 - 328 10*3/

?L.



                                                                 The reference r

zhen



                                                                 was not used to



                                                                 interpret this



                                                                 result as



                                                                 normal/abnormal

.

 

             MPV (test code = 9.9 fL       9.8-13.0                  



             20243-4)                                            

 

             NRBC/100 WBC (test              See_Comment                [Automat

ed



             code = 2906668436)                                        message] 

The system



                                                                 which generated



                                                                 this result



                                                                 transmitted



                                                                 reference range

:



                                                                 0.0 - 10.0 /100



                                                                 WBCs. The refer

ence



                                                                 range was not u

sed



                                                                 to interpret th

is



                                                                 result as



                                                                 normal/abnormal

.

 

             NRBC x10^3 (test code <0.01        See_Comment                [Auto

mated



             = [id number])                                        message] The s

ystem



                                                                 which generated



                                                                 this result



                                                                 transmitted



                                                                 reference range

:



                                                                 10*3/?L. The



                                                                 reference range

 was



                                                                 not used to



                                                                 interpret this



                                                                 result as



                                                                 normal/abnormal

.

 

             GRAN MAT (NEUT) % 62.6 %                                 



             (test code = 770-8)                                        

 

             IMM GRAN % (test code 1.30 %                                 



             = [id number])                                        

 

             LYMPH % (test code = 23.2 %                                 



             736-9)                                              

 

             MONO % (test code = 9.6 %                                  



             5905-5)                                             

 

             EOS % (test code = 2.9 %                                  



             713-8)                                              

 

             BASO % (test code = 0.4 %                                  



             706-2)                                              

 

             GRAN MAT x10^3(ANC) 5.85 10*3/uL 1.99-6.95                 



             (test code =                                        



             0941288694)                                         

 

             IMM GRAN x10^3 (test 0.12 10*3/uL 0.00-0.06    H            



             code = 6659020243)                                        

 

             LYMPH x10^3 (test code 2.17 10*3/uL 1.09-3.23                 



             = 731-0)                                            

 

             MONO x10^3 (test code 0.90 10*3/uL 0.36-1.02                 



             = 742-7)                                            

 

             EOS x10^3 (test code = 0.27 10*3/uL 0.06-0.53                 



             711-2)                                              

 

             BASO x10^3 (test code 0.04 10*3/uL 0.01-0.09                 



             = 704-7)                                            

 

             Lab Interpretation Abnormal                               



             (test code = 04693-5)                                        



[id number]





             Test Item    Value        Reference Range Interpretation Comments

 

             Blood Culture-Aerobic No organisms No growth                 Previo

us



             (test code = 17928-3) isolated                               prelim

inary



                                                                 verified result



                                                                 was Culture In



                                                                 Progress on



                                                                 2022 at 00

01



                                                                 CDTPrevious



                                                                 preliminary



                                                                 verified result



                                                                 was No growth a

t



                                                                 24 hours on



                                                                 2022 at 21

01



                                                                 CDTPrevious



                                                                 preliminary



                                                                 verified result



                                                                 was No growth a

t



                                                                 48 hours on



                                                                 2022 at 21

01



                                                                 CDTPrevious



                                                                 preliminary



                                                                 verified result



                                                                 was No growth a

t



                                                                 72 hours on



                                                                 2022 at 21

01



                                                                 CDT

 

             Blood        No organisms No growth                 Previous



             Culture-Anaerobic isolated                               preliminar

y



             (test code = 86708-9)                                        verifi

ed result



                                                                 was Culture In



                                                                 Progress on



                                                                 2022 at 00

01



                                                                 CDTPrevious



                                                                 preliminary



                                                                 verified result



                                                                 was No growth a

t



                                                                 24 hours on



                                                                 2022 at 21





                                                                 CDTPrevious



                                                                 preliminary



                                                                 verified result



                                                                 was No growth a

t



                                                                 48 hours on



                                                                 2022 at 21





                                                                 CDTPrevious



                                                                 preliminary



                                                                 verified result



                                                                 was No growth a

t



                                                                 72 hours on



                                                                 2022 at 21

01



                                                                 CDT

 

             Lab Interpretation Normal                                 



             (test code = 48644-7)                                        



[id number] 02:01:46





             Test Item    Value        Reference Range Interpretation Comments

 

             Blood Culture-Aerobic No organisms No growth                 Previo

us



             (test code = 17928-3) isolated                               prelim

inary



                                                                 verified result



                                                                 was Culture In



                                                                 Progress on



                                                                 2022 at 00

01



                                                                 CDTPrevious



                                                                 preliminary



                                                                 verified result



                                                                 was No growth a

t



                                                                 24 hours on



                                                                 2022 at 21

01



                                                                 CDTPrevious



                                                                 preliminary



                                                                 verified result



                                                                 was No growth a

t



                                                                 48 hours on



                                                                 2022 at 21





                                                                 CDTPrevious



                                                                 preliminary



                                                                 verified result



                                                                 was No growth a

t



                                                                 72 hours on



                                                                 2022 at 21

01



                                                                 CDT

 

             Blood        No organisms No growth                 Previous



             Culture-Anaerobic isolated                               preliminar

y



             (test code = 11457-0)                                        verifi

ed result



                                                                 was Culture In



                                                                 Progress on



                                                                 2022 at 00

01



                                                                 CDTPrevious



                                                                 preliminary



                                                                 verified result



                                                                 was No growth a

t



                                                                 24 hours on



                                                                 2022 at 21

01



                                                                 CDTPrevious



                                                                 preliminary



                                                                 verified result



                                                                 was No growth a

t



                                                                 48 hours on



                                                                 2022 at 21

01



                                                                 CDTPrevious



                                                                 preliminary



                                                                 verified result



                                                                 was No growth a

t



                                                                 72 hours on



                                                                 2022 at 21

01



                                                                 CDT

 

             Lab Interpretation Normal                                 



             (test code = 71674-5)                                        



[id number] 12:18:49





             Test Item    Value        Reference Range Interpretation Comments

 

             NT-proBNP (test code 117 pg/mL    See_Comment                [Autom

ated



             = 7384560027)                                        message] The



                                                                 system which



                                                                 generated this



                                                                 result



                                                                 transmitted



                                                                 reference range

:



                                                                 <=125. The



                                                                 reference range



                                                                 was not used to



                                                                 interpret this



                                                                 result as



                                                                 normal/abnormal

.

 

             MAL (test code = MAL) Biotin has been                           



                          reported to                            



                          cause a negative                           



                          bias, interpret                           



                          results relative                           



                          to patient's use                           



                          of biotin.                             

 

             Lab Interpretation Normal                                 



             (test code = 62748-8)                                        



[facility] METABOLIC PANEL ([id number] 
12:10:25





             Test Item    Value        Reference Range Interpretation Comments

 

             NA (test code = 137 mmol/L   135-145                   



             4734497298)                                         

 

             K (test code = 4.6 mmol/L   3.5-5.0                   



             7223459779)                                         

 

             CL (test code = 108 mmol/L                       



             0938850535)                                         

 

             CO2 TOTAL (test code = 20 mmol/L    23-31        L            



             9076606959)                                         

 

             AGAP (test code =              2-16                      



             4172086914)                                         

 

             BUN (test code = 12 mg/dL     7-23                      



             6610530368)                                         

 

             GLUCOSE (test code = 186 mg/dL           H            



             6720204618)                                         

 

             CREATININE (test code = 0.49 mg/dL   0.60-1.25    L            



             4579255424)                                         

 

             TOTAL BILI (test code = 0.7 mg/dL    0.1-1.1                   



             6601663826)                                         

 

             CALCIUM (test code = 8.7 mg/dL    8.6-10.6                  



             1608187326)                                         

 

             T PROTEIN (test code = 6.9 g/dL     6.3-8.2                   



             9922929736)                                         

 

             ALBUMIN (test code = 3.7 g/dL     3.5-5.0                   



             6237824206)                                         

 

             ALK PHOS (test code = 114 U/L                          



             4782703107)                                         

 

             ALTv (test code = 75 U/L       5-50         H            



             1742-6)                                             

 

             AST(SGOT) (test code = 27 U/L       13-40                     



             3013214625)                                         

 

             eGFR (test code =              mL/min/1.73m2              



             3066061997)                                         

 

             MAL (test code = MAL) Association of                           



                          Glomerular Filtration                           



                          Rate (GFR) and Staging                           



                          of Kidney Disease*                           



                          +---------------------                           



                          --+-------------------                           



                          --+-------------------                           



                          ------+| GFR                           



                          (mL/min/1.73 m2) ?|                           



                          With Kidney Damage ?|                           



                          ?Without Kidney                           



                          Damage+---------------                           



                          --------+-------------                           



                          --------+-------------                           



                          ------------+| ?>90 ?                           



                          ? ? ? ? ? ? ? ?|                           



                          ?Stage one ? ? ? ? ?|                           



                          ? Normal ? ? ? ? ? ? ?                           



                          ?+--------------------                           



                          ---+------------------                           



                          ---+------------------                           



                          -------+| ?60-89 ? ? ?                           



                          ? ? ? ? ?| ?Stage two                           



                          ? ? ? ? ?| ? Decreased                           



                          GFR ? ? ? ?                            



                          +---------------------                           



                          --+-------------------                           



                          --+-------------------                           



                          ------+| ?30-59 ? ? ?                           



                          ? ? ? ? ?| ?Stage                           



                          three ? ? ? ?| ? Stage                           



                          three ? ? ? ? ?                           



                          +---------------------                           



                          --+-------------------                           



                          --+-------------------                           



                          ------+| ?15-29 ? ? ?                           



                          ? ? ? ? ?| ?Stage four                           



                          ? ? ? ? | ? Stage four                           



                          ? ? ? ? ?                              



                          ?+--------------------                           



                          ---+------------------                           



                          ---+------------------                           



                          -------+| ?<15 (or                           



                          dialysis) ? ?| ?Stage                           



                          five ? ? ? ? | ? Stage                           



                          five ? ? ? ? ?                           



                          ?+--------------------                           



                          ---+------------------                           



                          ---+------------------                           



                          -------+ *Each stage                           



                          assumes the associated                           



                          GFR level has been in                           



                          effect for at least                           



                          three months. ?Stages                           



                          1 to 5, with or                           



                          without kidney                           



                          disease, indicate                           



                          chronic kidney                           



                          disease. Notes:                           



                          Determination of                           



                          stages one and two                           



                          (with eGFR                             



                          >59mL/min/1.73 m2)                           



                          requires estimation of                           



                          kidney damage for at                           



                          least three months as                           



                          defined by structural                           



                          or functional                           



                          abnormalities of the                           



                          kidney, manifested by                           



                          either:Pathological                           



                          abnormalities or                           



                          Markers of kidney                           



                          damage (including                           



                          abnormalities in the                           



                          composition of the                           



                          blood or urine or                           



                          abnormalities in                           



                          imaging tests).                           

 

             Lab Interpretation Abnormal                               



             (test code = 22836-4)                                        



[facility] WITH [id number] 11:14:25





             Test Item    Value        Reference Range Interpretation Comments

 

             WBC (test code =              See_Comment                [Automated



             8030-2)                                             message] The sy

stem



                                                                 which generated



                                                                 this result



                                                                 transmitted



                                                                 reference range

:



                                                                 4.20 - 10.70



                                                                 10*3/?L. The



                                                                 reference range

 was



                                                                 not used to



                                                                 interpret this



                                                                 result as



                                                                 normal/abnormal

.

 

             RBC (test code =              See_Comment                [Automated



             217-0)                                              message] The sy

stem



                                                                 which generated



                                                                 this result



                                                                 transmitted



                                                                 reference range

:



                                                                 4.26 - 5.52



                                                                 10*6/?L. The



                                                                 reference range

 was



                                                                 not used to



                                                                 interpret this



                                                                 result as



                                                                 normal/abnormal

.

 

             HGB (test code = 14.8 g/dL    12.2-16.4                 



             718-7)                                              

 

             HCT (test code = 44.8 %       38.4-49.3                 



             4544-3)                                             

 

             MCV (test code = 89.1 fL      81.7-95.6                 



             787-2)                                              

 

             MCH (test code = 29.4 pg      26.1-32.7                 



             785-6)                                              

 

             MCHC (test code = 33.0 g/dL    31.2-35.0                 



             786-4)                                              

 

             RDW-SD (test code = 48.7 fL      38.5-51.6                 



             89419-2)                                            

 

             RDW-CV (test code = 15.0 %       12.1-15.4                 



             788-0)                                              

 

             PLT (test code =              See_Comment                [Automated



             777-3)                                              message] The sy

stem



                                                                 which generated



                                                                 this result



                                                                 transmitted



                                                                 reference range

:



                                                                 150 - 328 10*3/

?L.



                                                                 The reference r

zhen



                                                                 was not used to



                                                                 interpret this



                                                                 result as



                                                                 normal/abnormal

.

 

             MPV (test code = 10.4 fL      9.8-13.0                  



             37370-8)                                            

 

             NRBC/100 WBC (test              See_Comment                [Automat

ed



             code = 1827235698)                                        message] 

The system



                                                                 which generated



                                                                 this result



                                                                 transmitted



                                                                 reference range

:



                                                                 0.0 - 10.0 /100



                                                                 WBCs. The refer

ence



                                                                 range was not u

sed



                                                                 to interpret th

is



                                                                 result as



                                                                 normal/abnormal

.

 

             NRBC x10^3 (test code <0.01        See_Comment                [Auto

mated



             = [id number])                                        message] The s

ystem



                                                                 which generated



                                                                 this result



                                                                 transmitted



                                                                 reference range

:



                                                                 10*3/?L. The



                                                                 reference range

 was



                                                                 not used to



                                                                 interpret this



                                                                 result as



                                                                 normal/abnormal

.

 

             GRAN MAT (NEUT) % 65.5 %                                 



             (test code = 770-8)                                        

 

             IMM GRAN % (test code 0.80 %                                 



             = 8874266882)                                        

 

             LYMPH % (test code = 20.9 %                                 



             736-9)                                              

 

             MONO % (test code = 9.7 %                                  



             5905-5)                                             

 

             EOS % (test code = 2.7 %                                  



             713-8)                                              

 

             BASO % (test code = 0.4 %                                  



             706-2)                                              

 

             GRAN MAT x10^3(ANC) 5.41 10*3/uL 1.99-6.95                 



             (test code =                                        



             9090466693)                                         

 

             IMM GRAN x10^3 (test 0.07 10*3/uL 0.00-0.06    H            



             code = 7465399026)                                        

 

             LYMPH x10^3 (test code 1.73 10*3/uL 1.09-3.23                 



             = 731-0)                                            

 

             MONO x10^3 (test code 0.80 10*3/uL 0.36-1.02                 



             = 742-7)                                            

 

             EOS x10^3 (test code = 0.22 10*3/uL 0.06-0.53                 



             711-2)                                              

 

             BASO x10^3 (test code 0.03 10*3/uL 0.01-0.09                 



             = 704-7)                                            

 

             Lab Interpretation Abnormal                               



             (test code = 75659-7)                                        



[id number]





             Test Item    Value        Reference Range Interpretation Comments

 

             NT-proBNP (test code 157 pg/mL    See_Comment  H             [Autom

ated



             = 3140648369)                                        message] The



                                                                 system which



                                                                 generated this



                                                                 result



                                                                 transmitted



                                                                 reference range

:



                                                                 <=125. The



                                                                 reference range



                                                                 was not used to



                                                                 interpret this



                                                                 result as



                                                                 normal/abnormal

.

 

             MAL (test code = AML) Biotin has been                           



                          reported to                            



                          cause a negative                           



                          bias, interpret                           



                          results relative                           



                          to patient's use                           



                          of biotin.                             

 

             Lab Interpretation Abnormal                               



             (test code = 76575-3)                                        



[id number] 
13:08:45





             Test Item    Value        Reference Range Interpretation Comments

 

             NA (test code = 141 mmol/L   135-145                   



             6577648286)                                         

 

             K (test code = 4.2 mmol/L   3.5-5.0                   



             1481345809)                                         

 

             CL (test code = 110 mmol/L          H            



             9907558577)                                         

 

             CO2 TOTAL (test code = 24 mmol/L    23-31                     



             8450332332)                                         

 

             AGAP (test code =              2-16                      



             3644693828)                                         

 

             BUN (test code = 11 mg/dL     7-23                      



             4724169383)                                         

 

             GLUCOSE (test code = 142 mg/dL           H            



             4148292782)                                         

 

             CREATININE (test code = 0.61 mg/dL   0.60-1.25                 



             5567602997)                                         

 

             TOTAL BILI (test code = 0.7 mg/dL    0.1-1.1                   



             2355898356)                                         

 

             CALCIUM (test code = 8.5 mg/dL    8.6-10.6     L            



             0676517398)                                         

 

             T PROTEIN (test code = 6.7 g/dL     6.3-8.2                   



             0342099096)                                         

 

             ALBUMIN (test code = 3.6 g/dL     3.5-5.0                   



             5729052286)                                         

 

             ALK PHOS (test code = 120 U/L                          



             6288867690)                                         

 

             ALTv (test code = 88 U/L       5-50         H            



             -)                                             

 

             AST(SGOT) (test code = 27 U/L       13-40                     



             2443012048)                                         

 

             eGFR (test code =              mL/min/1.73m2              



             7215304208)                                         

 

             MAL (test code = MAL) Association of                           



                          Glomerular Filtration                           



                          Rate (GFR) and Staging                           



                          of Kidney Disease*                           



                          +---------------------                           



                          --+-------------------                           



                          --+-------------------                           



                          ------+| GFR                           



                          (mL/min/1.73 m2) ?|                           



                          With Kidney Damage ?|                           



                          ?Without Kidney                           



                          Damage+---------------                           



                          --------+-------------                           



                          --------+-------------                           



                          ------------+| ?>90 ?                           



                          ? ? ? ? ? ? ? ?|                           



                          ?Stage one ? ? ? ? ?|                           



                          ? Normal ? ? ? ? ? ? ?                           



                          ?+--------------------                           



                          ---+------------------                           



                          ---+------------------                           



                          -------+| ?60-89 ? ? ?                           



                          ? ? ? ? ?| ?Stage two                           



                          ? ? ? ? ?| ? Decreased                           



                          GFR ? ? ? ?                            



                          +---------------------                           



                          --+-------------------                           



                          --+-------------------                           



                          ------+| ?30-59 ? ? ?                           



                          ? ? ? ? ?| ?Stage                           



                          three ? ? ? ?| ? Stage                           



                          three ? ? ? ? ?                           



                          +---------------------                           



                          --+-------------------                           



                          --+-------------------                           



                          ------+| ?15-29 ? ? ?                           



                          ? ? ? ? ?| ?Stage four                           



                          ? ? ? ? | ? Stage four                           



                          ? ? ? ? ?                              



                          ?+--------------------                           



                          ---+------------------                           



                          ---+------------------                           



                          -------+| ?<15 (or                           



                          dialysis) ? ?| ?Stage                           



                          five ? ? ? ? | ? Stage                           



                          five ? ? ? ? ?                           



                          ?+--------------------                           



                          ---+------------------                           



                          ---+------------------                           



                          -------+ *Each stage                           



                          assumes the associated                           



                          GFR level has been in                           



                          effect for at least                           



                          three months. ?Stages                           



                          1 to 5, with or                           



                          without kidney                           



                          disease, indicate                           



                          chronic kidney                           



                          disease. Notes:                           



                          Determination of                           



                          stages one and two                           



                          (with eGFR                             



                          >59mL/min/1.73 m2)                           



                          requires estimation of                           



                          kidney damage for at                           



                          least three months as                           



                          defined by structural                           



                          or functional                           



                          abnormalities of the                           



                          kidney, manifested by                           



                          either:Pathological                           



                          abnormalities or                           



                          Markers of kidney                           



                          damage (including                           



                          abnormalities in the                           



                          composition of the                           



                          blood or urine or                           



                          abnormalities in                           



                          imaging tests).                           

 

             Lab Interpretation Abnormal                               



             (test code = 35063-3)                                        



[facility]





             Test Item    Value        Reference Range Interpretation Comments

 

             MAGNESIUM (test code = 4232224485) 1.7 mg/dL    1.7-2.4            

       

 

             Lab Interpretation (test code = Normal                             

    



             39247-9)                                            



[facility] WITH [id number] 11:57:56





             Test Item    Value        Reference Range Interpretation Comments

 

             WBC (test code =              See_Comment                [Automated



             5890-2)                                             message] The sy

stem



                                                                 which generated



                                                                 this result



                                                                 transmitted



                                                                 reference range

:



                                                                 4.20 - 10.70



                                                                 10*3/?L. The



                                                                 reference range

 was



                                                                 not used to



                                                                 interpret this



                                                                 result as



                                                                 normal/abnormal

.

 

             RBC (test code =              See_Comment                [Automated



             789-8)                                              message] The sy

stem



                                                                 which generated



                                                                 this result



                                                                 transmitted



                                                                 reference range

:



                                                                 4.26 - 5.52



                                                                 10*6/?L. The



                                                                 reference range

 was



                                                                 not used to



                                                                 interpret this



                                                                 result as



                                                                 normal/abnormal

.

 

             HGB (test code = 14.6 g/dL    12.2-16.4                 



             718-7)                                              

 

             HCT (test code = 43.5 %       38.4-49.3                 



             4544-3)                                             

 

             MCV (test code = 88.4 fL      81.7-95.6                 



             787-2)                                              

 

             MCH (test code = 29.7 pg      26.1-32.7                 



             785-6)                                              

 

             MCHC (test code = 33.6 g/dL    31.2-35.0                 



             786-4)                                              

 

             RDW-SD (test code = 48.9 fL      38.5-51.6                 



             86707-2)                                            

 

             RDW-CV (test code = 14.9 %       12.1-15.4                 



             788-0)                                              

 

             PLT (test code =              See_Comment                [Automated



             777-3)                                              message] The sy

stem



                                                                 which generated



                                                                 this result



                                                                 transmitted



                                                                 reference range

:



                                                                 150 - 328 10*3/

?L.



                                                                 The reference r

zhen



                                                                 was not used to



                                                                 interpret this



                                                                 result as



                                                                 normal/abnormal

.

 

             MPV (test code = 9.4 fL       9.8-13.0     L            



             25011-3)                                            

 

             NRBC/100 WBC (test              See_Comment                [Automat

ed



             code = 4928918003)                                        message] 

The system



                                                                 which generated



                                                                 this result



                                                                 transmitted



                                                                 reference range

:



                                                                 0.0 - 10.0 /100



                                                                 WBCs. The refer

ence



                                                                 range was not u

sed



                                                                 to interpret th

is



                                                                 result as



                                                                 normal/abnormal

.

 

             NRBC x10^3 (test code <0.01        See_Comment                [Auto

mated



             = 4068914211)                                        message] The s

ystem



                                                                 which generated



                                                                 this result



                                                                 transmitted



                                                                 reference range

:



                                                                 10*3/?L. The



                                                                 reference range

 was



                                                                 not used to



                                                                 interpret this



                                                                 result as



                                                                 normal/abnormal

.

 

             GRAN MAT (NEUT) % 63.9 %                                 



             (test code = 770-8)                                        

 

             IMM GRAN % (test code 0.70 %                                 



             = [id number])                                        

 

             LYMPH % (test code = 22.7 %                                 



             736-9)                                              

 

             MONO % (test code = 9.6 %                                  



             5905-5)                                             

 

             EOS % (test code = 2.7 %                                  



             713-8)                                              

 

             BASO % (test code = 0.4 %                                  



             706-2)                                              

 

             GRAN MAT x10^3(ANC) 4.75 10*3/uL 1.99-6.95                 



             (test code =                                        



             5806351119)                                         

 

             IMM GRAN x10^3 (test 0.05 10*3/uL 0.00-0.06                 



             code = 7752793210)                                        

 

             LYMPH x10^3 (test code 1.69 10*3/uL 1.09-3.23                 



             = 731-0)                                            

 

             MONO x10^3 (test code 0.71 10*3/uL 0.36-1.02                 



             = 742-7)                                            

 

             EOS x10^3 (test code = 0.20 10*3/uL 0.06-0.53                 



             711-2)                                              

 

             BASO x10^3 (test code 0.03 10*3/uL 0.01-0.09                 



             = 704-7)                                            

 

             Lab Interpretation Abnormal                               



             (test code = 19733-2)                                        



[id number]





             Test Item    Value        Reference Range Interpretation Comments

 

             VIT B12 (test code = 249 pg/mL    240-930                   



             [id number])                                         

 

             MAL (test code = MAL) Biotin has been                           



                          reported to cause a                           



                          positive bias,                           



                          interpret results                           



                          relative to                            



                          patient's use of                           



                          biotin.                                

 

             Lab Interpretation (test Normal                                 



             code = 61891-0)                                        



[facility] [id number] 19:18:39





             Test Item    Value        Reference Range Interpretation Comments

 

             VIT B12 (test code = 249 pg/mL    240-930                   



             [id number])                                         

 

             MAL (test code = MAL) Biotin has been                           



                          reported to cause a                           



                          positive bias,                           



                          interpret results                           



                          relative to                            



                          patient's use of                           



                          biotin.                                

 

             Lab Interpretation (test Normal                                 



             code = 08949-6)                                        



[id number] 17:30:28





             Test Item    Value        Reference Range Interpretation Comments

 

             VIT D 25OH (test code = 16 ng/mL     25-80        L            



             94581-3)                                            

 

             MAL (test code = MAL) Deficiency: <20                           



                          ng/mLInsufficiency:                           



                          20-24 ng/mLOptimal:                           



                          25-80 ng/mL                            

 

             Lab Interpretation (test Abnormal                               



             code = 01892-7)                                        



[facility] [id number] 17:30:28





             Test Item    Value        Reference Range Interpretation Comments

 

             VIT D 25OH (test code = 16 ng/mL     25-80        L            



             40636-2)                                            

 

             MAL (test code = MAL) Deficiency: <20                           



                          ng/mLInsufficiency:                           



                          20-24 ng/mLOptimal:                           



                          25-80 ng/mL                            

 

             Lab Interpretation (test Abnormal                               



             code = 10090-4)                                        



[id number]32





             Test Item    Value        Reference    Interpretation Comments



                                       Range                     

 

             Procalcitonin (test 0.04 ng/mL   See_Comment                [[employer]

huma



             code = [id number])                                        message] 

The



                                                                 system which



                                                                 generated this



                                                                 result



                                                                 transmitted



                                                                 reference



                                                                 range: <=0.07.



                                                                 The reference



                                                                 range was not



                                                                 used to



                                                                 interpret this



                                                                 result as



                                                                 normal/abnormal



                                                                 .

 

             MAL (test code = INTERPRETATION OF                           



             MAL)         PROCALCITONIN RESULTS                           



                          IN ADULTS >= 18 YEARS                           



                          OF AGE Initiation and                           



                          discontinuation of                           



                          antibiotics on                           



                          patients with                           



                          suspected or confirmed                           



                          Lower Respiratory                           



                          Tract Infection in                           



                          Adults >= 18 years of                           



                          age.                                   



                          +--------------+------                           



                          ----------+-----------                           



                          ----+-----------------                           



                          -----------+|Procalcit                           



                          onin |Interpretation                           



                          ?|Antibiotic ? ?                           



                          |Considerations ? ? ?                           



                          ? ? ? ? |ng/mL ? ? ? ?                           



                          | ? ? ? ? ? ? ?                           



                          ?|recommendation | ? ?                           



                          ? ? ? ? ? ? ? ? ? ? ?                           



                          ?                                      



                          +--------------+------                           



                          ----------+-----------                           



                          ----+-----------------                           



                          -----------+| <0.1 ? ?                           



                          ? ? | Bacterial ? ? ?|                           



                          Strongly ? ? ?| ? ? ?                           



                          ? ? ? ? ? ? ? ? ? ? ?                           



                          | ? ? ? ? ? ? ?|                           



                          infection very |                           



                          discouraged ? |                           



                          Overruling: ? ? ? ? ?                           



                          ? ? ? | ? ? ? ? ? ? ?|                           



                          unlikely ? ? ? | ? ? ?                           



                          ? ? ? ? | ? Clinically                           



                          unstable ? ? ?                           



                          +--------------+------                           



                          ----------+-----------                           



                          ----+ ? High risk for                           



                          adverse ? ? | <0.25 ?                           



                          ? ? ?| Bacterial ? ?                           



                          ?| Discouraged ? | ?                           



                          outcome ? ? ? ? ? ? ?                           



                          ? ? | ? ? ? ? ? ? ?|                           



                          infection ? ? ?| ? ? ?                           



                          ? ? ? ? | ? SEE                           



                          IMPORTANT NOTE ? ? ?                           



                          ?| ? ? ? ? ? ? ?|                           



                          unlikely ? ? ? | ? ? ?                           



                          ? ? ? ? | ? ? ? ? ? ?                           



                          ? ? ? ? ? ? ? ?                           



                          +--------------+------                           



                          ----------+-----------                           



                          ----+-----------------                           



                          -----------+| >=0.25 ?                           



                          ? ? | Bacterial ? ? ?|                           



                          Encouraged ? ?| ? ? ?                           



                          ? ? ? ? ? ? ? ? ? ? ?                           



                          | ? ? ? ? ? ? ?|                           



                          infection ? ? ?| ? ? ?                           



                          ? ? ? ? | ? ? ? ? ? ?                           



                          ? ? ? ? ? ? ? ? | ? ?                           



                          ? ? ? ? ?| likely ? ?                           



                          ? ? | ? ? ? ? ? ? ? |                           



                          Consider treatment                           



                          failure                                



                          ?+--------------+-----                           



                          -----------+----------                           



                          -----+ if levels does                           



                          not decrease | >0.5 ?                           



                          ? ? ? | Bacterial ? ?                           



                          ?| Strongly ? ? ?|                           



                          appropriately ? ? ? ?                           



                          ? ? ? | ? ? ? ? ? ? ?|                           



                          infection very |                           



                          encouraged ? ?| ? ? ?                           



                          ? ? ? ? ? ? ? ? ? ? ?                           



                          | ? ? ? ? ? ? ?|                           



                          likely ? ? ? ? | ? ? ?                           



                          ? ? ? ? | ? ? ? ? ? ?                           



                          ? ? ? ? ? ? ? ?                           



                          +--------------+------                           



                          ----------+-----------                           



                          ----+-----------------                           



                          -----------+                           



                          Discontinuation of                           



                          antibiotics in                           



                          high-acuity patients                           



                          with suspected or                           



                          confirmed sepsis in                           



                          Adults >= 18 years of                           



                          age.                                   



                          +--------------+------                           



                          ----------+-----------                           



                          ----+-----------------                           



                          -----------+|Procalcit                           



                          onin |Interpretation                           



                          ?|Antibiotic ? ?                           



                          |Considerations ? ? ?                           



                          ? ? ? ? |ng/mL ? ? ? ?                           



                          | ? ? ? ? ? ? ?                           



                          ?|recommendation | ? ?                           



                          ? ? ? ? ? ? ? ? ? ? ?                           



                          ?                                      



                          +--------------+------                           



                          ----------+-----------                           



                          ----+-----------------                           



                          -----------+| <0.25 ?                           



                          ? ? ?| Bacterial ? ?                           



                          ?| Strongly ? ? ?| ? ?                           



                          ? ? ? ? ? ? ? ? ? ? ?                           



                          ? | ? ? ? ? ? ? ?|                           



                          infection very |                           



                          discouraged ? |                           



                          Overruling: ? ? ? ? ?                           



                          ? ? ? | ? ? ? ? ? ? ?|                           



                          unlikely ? ? ? | ? ? ?                           



                          ? ? ? ? | ? Clinically                           



                          unstable ? ? ?                           



                          +--------------+------                           



                          ----------+-----------                           



                          ----+ ? High risk for                           



                          adverse ? ? | <0.5 or                           



                          drop | Bacterial ? ?                           



                          ?| Discouraged ? | ?                           



                          outcome ? ? ? ? ? ? ?                           



                          ? ? | >80% from ? ?|                           



                          infection ? ? ?| ? ? ?                           



                          ? ? ? ? | ? SEE                           



                          IMPORTANT NOTE ? ? ?                           



                          ?| highest PCT ?|                           



                          unlikely ? ? ? | ? ? ?                           



                          ? ? ? ? | ? ? ? ? ? ?                           



                          ? ? ? ? ? ? ? ? |                           



                          level ? ? ? ?| ? ? ? ?                           



                          ? ? ? ?| ? ? ? ? ? ? ?                           



                          | ? ? ? ? ? ? ? ? ? ?                           



                          ? ? ? ?                                



                          +--------------+------                           



                          ----------+-----------                           



                          ----+-----------------                           



                          -----------+| >=0.5 ?                           



                          ? ? ?| Bacterial ? ?                           



                          ?| Encouraged ? ?| ? ?                           



                          ? ? ? ? ? ? ? ? ? ? ?                           



                          ? | ? ? ? ? ? ? ?|                           



                          infection ? ? ?| ? ? ?                           



                          ? ? ? ? | ? ? ? ? ? ?                           



                          ? ? ? ? ? ? ? ? | ? ?                           



                          ? ? ? ? ?| likely ? ?                           



                          ? ? | ? ? ? ? ? ? ? |                           



                          Consider treatment                           



                          failure                                



                          ?+--------------+-----                           



                          -----------+----------                           



                          -----+ if levels does                           



                          not decrease | >1.0 ?                           



                          ? ? ? | Bacterial ? ?                           



                          ?| Strongly ? ? ?|                           



                          appropriately ? ? ? ?                           



                          ? ? ? | ? ? ? ? ? ? ?|                           



                          infection very |                           



                          encouraged ? ?| ? ? ?                           



                          ? ? ? ? ? ? ? ? ? ? ?                           



                          | ? ? ? ? ? ? ?|                           



                          likely ? ? ? ? | ? ? ?                           



                          ? ? ? ? | ? ? ? ? ? ?                           



                          ? ? ? ? ? ? ? ?                           



                          +--------------+------                           



                          ----------+-----------                           



                          ----+-----------------                           



                          -----------+                           



                          Percentage of drop of                           



                          Procalcitonin                           



                          calculation for                           



                          Discontinuation of                           



                          antibiotics in                           



                          high-acuity patients                           



                          with suspected or                           



                          confirmed sepsis in                           



                          Adults >= 18 years of                           



                          age. ? ? ? ? ? ? ? ? ?                           



                          ? ? Procalcitonin                           



                          highest{}-Procalcitoni                           



                          n current{}Delta                           



                          Procalcitonin =                           



                          ______________________                           



                          ______________________                           



                          ___ x100% ? ? ? ? ? ?                           



                          ? ? ? ? ? ? ? ? ? ?                           



                          Procalcitonin current                           



                          {} IMPORTANT NOTE:                           



                          Procalcitonin may be                           



                          elevated without                           



                          bacterial infection by                           



                          physiologic stress                           



                          related to trauma,                           



                          burns, chronic                           



                          dialysis, metastatic                           



                          cancer, surgery in the                           



                          past seven days,                           



                          malaria, some fungal                           



                          infections, and some                           



                          forms of vasculitis.                           



                          The interpretation                           



                          algorithm may not                           



                          apply to patients with                           



                          immunosuppression                           



                          (equivalent of >10 mg                           



                          of prednisone daily),                           



                          HIV with CD4 cell                           



                          count < 350 cells/mm3,                           



                          active malignancy on                           



                          systemic chemotherapy,                           



                          solid organ transplant                           



                          or hematopoietic stem                           



                          cell transplantation,                           



                          or hospital acquired                           



                          pneumonia.                             



                          Additionally, some                           



                          clinical trials of                           



                          procalcitonin have                           



                          excluded patients with                           



                          shock requiring                           



                          vasopressor use, acute                           



                          respiratory failure                           



                          requiring mechanical                           



                          ventilation, or those                           



                          with known lung                           



                          abscess/empyema. For                           



                          further information                           



                          please refer                           



                          to:[email or web address]                           



                          [email or web address]                           



                          p                                      

 

             Lab Interpretation Normal                                 



             (test code =                                        



             83988-6)                                            



[facility]





             Test Item    Value        Reference Range Interpretation Comments

 

             Procalcitonin (test 0.04 ng/mL   <0.07                     



             code = 3500169675)                                        

 

             MAL (test code = MAL) INTERPRETATION OF                           



                          PROCALCITONIN RESULTS IN                           



                          ADULTS >= 18 YEARS OF                           



                          AGE Initiation and                           



                          discontinuation of                           



                          antibiotics on patients                           



                          with suspected or                           



                          confirmed Lower                           



                          Respiratory Tract                           



                          Infection in Adults >=                           



                          18 years of age.                           



                          +--------------+--------                           



                          --------+---------------                           



                          +-----------------------                           



                          -----+|Procalcitonin                           



                          |Interpretation                           



                          ?|Antibiotic ? ?                           



                          |Considerations ? ? ? ?                           



                          ? ? ? |ng/mL ? ? ? ? | ?                           



                          ? ? ? ? ? ?                            



                          ?|recommendation | ? ? ?                           



                          ? ? ? ? ? ? ? ? ? ? ?                           



                          +--------------+--------                           



                          --------+---------------                           



                          +-----------------------                           



                          -----+| <0.1 ? ? ? ? |                           



                          Bacterial ? ? ?|                           



                          Strongly ? ? ?| ? ? ? ?                           



                          ? ? ? ? ? ? ? ? ? ? | ?                           



                          ? ? ? ? ? ?| infection                           



                          very | discouraged ? |                           



                          Overruling: ? ? ? ? ? ?                           



                          ? ? | ? ? ? ? ? ? ?|                           



                          unlikely ? ? ? | ? ? ? ?                           



                          ? ? ? | ? Clinically                           



                          unstable ? ? ?                           



                          +--------------+--------                           



                          --------+---------------                           



                          + ? High risk for                           



                          adverse ? ? | <0.25 ? ?                           



                          ? ?| Bacterial ? ? ?|                           



                          Discouraged ? | ?                           



                          outcome ? ? ? ? ? ? ? ?                           



                          ? | ? ? ? ? ? ? ?|                           



                          infection ? ? ?| ? ? ? ?                           



                          ? ? ? | ? SEE IMPORTANT                           



                          NOTE ? ? ? ?| ? ? ? ? ?                           



                          ? ?| unlikely ? ? ? | ?                           



                          ? ? ? ? ? ? | ? ? ? ? ?                           



                          ? ? ? ? ? ? ? ? ?                           



                          +--------------+--------                           



                          --------+---------------                           



                          +-----------------------                           



                          -----+| >=0.25 ? ? ? |                           



                          Bacterial ? ? ?|                           



                          Encouraged ? ?| ? ? ? ?                           



                          ? ? ? ? ? ? ? ? ? ? | ?                           



                          ? ? ? ? ? ?| infection ?                           



                          ? ?| ? ? ? ? ? ? ? | ? ?                           



                          ? ? ? ? ? ? ? ? ? ? ? ?                           



                          | ? ? ? ? ? ? ?| likely                           



                          ? ? ? ? | ? ? ? ? ? ? ?                           



                          | Consider treatment                           



                          failure                                



                          ?+--------------+-------                           



                          ---------+--------------                           



                          -+ if levels does not                           



                          decrease | >0.5 ? ? ? ?                           



                          | Bacterial ? ? ?|                           



                          Strongly ? ? ?|                           



                          appropriately ? ? ? ? ?                           



                          ? ? | ? ? ? ? ? ? ?|                           



                          infection very |                           



                          encouraged ? ?| ? ? ? ?                           



                          ? ? ? ? ? ? ? ? ? ? | ?                           



                          ? ? ? ? ? ?| likely ? ?                           



                          ? ? | ? ? ? ? ? ? ? | ?                           



                          ? ? ? ? ? ? ? ? ? ? ? ?                           



                          ?                                      



                          +--------------+--------                           



                          --------+---------------                           



                          +-----------------------                           



                          -----+ Discontinuation                           



                          of antibiotics in                           



                          high-acuity patients                           



                          with suspected or                           



                          confirmed sepsis in                           



                          Adults >= 18 years of                           



                          age.                                   



                          +--------------+--------                           



                          --------+---------------                           



                          +-----------------------                           



                          -----+|Procalcitonin                           



                          |Interpretation                           



                          ?|Antibiotic ? ?                           



                          |Considerations ? ? ? ?                           



                          ? ? ? |ng/mL ? ? ? ? | ?                           



                          ? ? ? ? ? ?                            



                          ?|recommendation | ? ? ?                           



                          ? ? ? ? ? ? ? ? ? ? ?                           



                          +--------------+--------                           



                          --------+---------------                           



                          +-----------------------                           



                          -----+| <0.25 ? ? ? ?|                           



                          Bacterial ? ? ?|                           



                          Strongly ? ? ?| ? ? ? ?                           



                          ? ? ? ? ? ? ? ? ? ? | ?                           



                          ? ? ? ? ? ?| infection                           



                          very | discouraged ? |                           



                          Overruling: ? ? ? ? ? ?                           



                          ? ? | ? ? ? ? ? ? ?|                           



                          unlikely ? ? ? | ? ? ? ?                           



                          ? ? ? | ? Clinically                           



                          unstable ? ? ?                           



                          +--------------+--------                           



                          --------+---------------                           



                          + ? High risk for                           



                          adverse ? ? | <0.5 or                           



                          drop | Bacterial ? ? ?|                           



                          Discouraged ? | ?                           



                          outcome ? ? ? ? ? ? ? ?                           



                          ? | >80% from ? ?|                           



                          infection ? ? ?| ? ? ? ?                           



                          ? ? ? | ? SEE IMPORTANT                           



                          NOTE ? ? ? ?| highest                           



                          PCT ?| unlikely ? ? ? |                           



                          ? ? ? ? ? ? ? | ? ? ? ?                           



                          ? ? ? ? ? ? ? ? ? ? |                           



                          level ? ? ? ?| ? ? ? ? ?                           



                          ? ? ?| ? ? ? ? ? ? ? | ?                           



                          ? ? ? ? ? ? ? ? ? ? ? ?                           



                          ?                                      



                          +--------------+--------                           



                          --------+---------------                           



                          +-----------------------                           



                          -----+| >=0.5 ? ? ? ?|                           



                          Bacterial ? ? ?|                           



                          Encouraged ? ?| ? ? ? ?                           



                          ? ? ? ? ? ? ? ? ? ? | ?                           



                          ? ? ? ? ? ?| infection ?                           



                          ? ?| ? ? ? ? ? ? ? | ? ?                           



                          ? ? ? ? ? ? ? ? ? ? ? ?                           



                          | ? ? ? ? ? ? ?| likely                           



                          ? ? ? ? | ? ? ? ? ? ? ?                           



                          | Consider treatment                           



                          failure                                



                          ?+--------------+-------                           



                          ---------+--------------                           



                          -+ if levels does not                           



                          decrease | >1.0 ? ? ? ?                           



                          | Bacterial ? ? ?|                           



                          Strongly ? ? ?|                           



                          appropriately ? ? ? ? ?                           



                          ? ? | ? ? ? ? ? ? ?|                           



                          infection very |                           



                          encouraged ? ?| ? ? ? ?                           



                          ? ? ? ? ? ? ? ? ? ? | ?                           



                          ? ? ? ? ? ?| likely ? ?                           



                          ? ? | ? ? ? ? ? ? ? | ?                           



                          ? ? ? ? ? ? ? ? ? ? ? ?                           



                          ?                                      



                          +--------------+--------                           



                          --------+---------------                           



                          +-----------------------                           



                          -----+ Percentage of                           



                          drop of Procalcitonin                           



                          calculation for                           



                          Discontinuation of                           



                          antibiotics in                           



                          high-acuity patients                           



                          with suspected or                           



                          confirmed sepsis in                           



                          Adults >= 18 years of                           



                          age. ? ? ? ? ? ? ? ? ? ?                           



                          ? Procalcitonin                           



                          highest{}-Procalcitonin                           



                          current{}Delta                           



                          Procalcitonin =                           



                          ________________________                           



                          _______________________                           



                          x100% ? ? ? ? ? ? ? ? ?                           



                          ? ? ? ? ? ? ?                           



                          Procalcitonin current {}                           



                          IMPORTANT NOTE:                           



                          Procalcitonin may be                           



                          elevated without                           



                          bacterial infection by                           



                          physiologic stress                           



                          related to trauma,                           



                          burns, chronic dialysis,                           



                          metastatic cancer,                           



                          surgery in the past                           



                          seven days, malaria,                           



                          some fungal infections,                           



                          and some forms of                           



                          vasculitis. The                           



                          interpretation algorithm                           



                          may not apply to                           



                          patients with                           



                          immunosuppression                           



                          (equivalent of >10 mg of                           



                          prednisone daily), HIV                           



                          with CD4 cell count <                           



                          350 cells/mm3, active                           



                          malignancy on systemic                           



                          chemotherapy, solid                           



                          organ transplant or                           



                          hematopoietic stem cell                           



                          transplantation, or                           



                          hospital acquired                           



                          pneumonia. Additionally,                           



                          some clinical trials of                           



                          procalcitonin have                           



                          excluded patients with                           



                          shock requiring                           



                          vasopressor use, acute                           



                          respiratory failure                           



                          requiring mechanical                           



                          ventilation, or those                           



                          with known lung                           



                          abscess/empyema. For                           



                          further information                           



                          please refer                           



                          to:[email or web address]                           



                          [email or web address]/best-care/HPVO/antio                           



                          [email or web address]                           

 

             Lab Interpretation Normal                                 



             (test code = 13537-5)                                        



[facility] HospitalSEDIMENT[facility] [id number] 13:21:10





             Test Item    Value        Reference Range Interpretation Comments

 

             ESR (test code =              See_Comment  H             [Automated

 message]



             [id number])                                         The system [employer]



                                                                 generated this 

result



                                                                 transmitted ref

erence



                                                                 range: 0 - 10 m

m/HR.



                                                                 The reference r

zhen



                                                                 was not used to



                                                                 interpret this 

result



                                                                 as normal/abnor

mal.

 

             Lab Interpretation (test Abnormal                               



             code = 38431-4)                                        



[facility]A1C[facility]2022 
13:12:27





             Test Item    Value        Reference Range Interpretation Comments

 

             HGB A1C (test code = 6.3 %        4.0-5.7      H            



             4548-4)                                             

 

             MAL (test code = MAL) Reference                              



                          RangesNormal:                           



                          <5.7%Prediabetes:                           



                          5.7 - 6.4%Diabetes:                           



                          > 6.5%                                 

 

             Lab Interpretation (test Abnormal                               



             code = 09298-7)                                        



[id number] 11:53:14





             Test Item    Value        Reference Range Interpretation Comments

 

             NT-proBNP (test code 16 pg/mL     See_Comment                [Autom

ated



             = [id number])                                        message] The



                                                                 system which



                                                                 generated this



                                                                 result



                                                                 transmitted



                                                                 reference range

:



                                                                 <=125. The



                                                                 reference range



                                                                 was not used to



                                                                 interpret this



                                                                 result as



                                                                 normal/abnormal

.

 

             MAL (test code = MAL) Biotin has been                           



                          reported to                            



                          cause a negative                           



                          bias, interpret                           



                          results relative                           



                          to patient's use                           



                          of biotin.                             

 

             Lab Interpretation Normal                                 



             (test code = 83388-6)                                        



[facility] (81765)2022 
11:44:55





             Test Item    Value        Reference Range Interpretation Comments

 

             NA (test code = 142 mmol/L   135-145                   



             [id number])                                         

 

             K (test code = 3.9 mmol/L   3.5-5.0                   



             1267660315)                                         

 

             CL (test code = 108 mmol/L                       



             3285250259)                                         

 

             CO2 TOTAL (test code = 24 mmol/L    23-31                     



             3911541687)                                         

 

             AGAP (test code =              2-16                      



             3453902153)                                         

 

             BUN (test code = 11 mg/dL     7-23                      



             4082364341)                                         

 

             GLUCOSE (test code = 205 mg/dL           H            



             5412596908)                                         

 

             CREATININE (test code = 0.71 mg/dL   0.60-1.25                 



             7615659531)                                         

 

             TOTAL BILI (test code = 0.7 mg/dL    0.1-1.1                   



             2354367510)                                         

 

             CALCIUM (test code = 8.9 mg/dL    8.6-10.6                  



             3177759378)                                         

 

             T PROTEIN (test code = 7.3 g/dL     6.3-8.2                   



             3775013220)                                         

 

             ALBUMIN (test code = 3.8 g/dL     3.5-5.0                   



             6998098246)                                         

 

             ALK PHOS (test code = 135 U/L             H            



             5624065096)                                         

 

             ALTv (test code = 120 U/L      5-50         H            



             2-6)                                             

 

             AST(SGOT) (test code = 33 U/L       13-40                     



             0098727917)                                         

 

             eGFR (test code =              mL/min/1.73m2              



             1342950191)                                         

 

             MAL (test code = MAL) Association of                           



                          Glomerular Filtration                           



                          Rate (GFR) and Staging                           



                          of Kidney Disease*                           



                          +---------------------                           



                          --+-------------------                           



                          --+-------------------                           



                          ------+| GFR                           



                          (mL/min/1.73 m2) ?|                           



                          With Kidney Damage ?|                           



                          ?Without Kidney                           



                          Damage+---------------                           



                          --------+-------------                           



                          --------+-------------                           



                          ------------+| ?>90 ?                           



                          ? ? ? ? ? ? ? ?|                           



                          ?Stage one ? ? ? ? ?|                           



                          ? Normal ? ? ? ? ? ? ?                           



                          ?+--------------------                           



                          ---+------------------                           



                          ---+------------------                           



                          -------+| ?60-89 ? ? ?                           



                          ? ? ? ? ?| ?Stage two                           



                          ? ? ? ? ?| ? Decreased                           



                          GFR ? ? ? ?                            



                          +---------------------                           



                          --+-------------------                           



                          --+-------------------                           



                          ------+| ?30-59 ? ? ?                           



                          ? ? ? ? ?| ?Stage                           



                          three ? ? ? ?| ? Stage                           



                          three ? ? ? ? ?                           



                          +---------------------                           



                          --+-------------------                           



                          --+-------------------                           



                          ------+| ?15-29 ? ? ?                           



                          ? ? ? ? ?| ?Stage four                           



                          ? ? ? ? | ? Stage four                           



                          ? ? ? ? ?                              



                          ?+--------------------                           



                          ---+------------------                           



                          ---+------------------                           



                          -------+| ?<15 (or                           



                          dialysis) ? ?| ?Stage                           



                          five ? ? ? ? | ? Stage                           



                          five ? ? ? ? ?                           



                          ?+--------------------                           



                          ---+------------------                           



                          ---+------------------                           



                          -------+ *Each stage                           



                          assumes the associated                           



                          GFR level has been in                           



                          effect for at least                           



                          three months. ?Stages                           



                          1 to 5, with or                           



                          without kidney                           



                          disease, indicate                           



                          chronic kidney                           



                          disease. Notes:                           



                          Determination of                           



                          stages one and two                           



                          (with eGFR                             



                          >59mL/min/1.73 m2)                           



                          requires estimation of                           



                          kidney damage for at                           



                          least three months as                           



                          defined by structural                           



                          or functional                           



                          abnormalities of the                           



                          kidney, manifested by                           



                          either:Pathological                           



                          abnormalities or                           



                          Markers of kidney                           



                          damage (including                           



                          abnormalities in the                           



                          composition of the                           



                          blood or urine or                           



                          abnormalities in                           



                          imaging tests).                           

 

             Lab Interpretation Abnormal                               



             (test code = 10259-7)                                        



[facility]





             Test Item    Value        Reference Range Interpretation Comments

 

             MAGNESIUM (test code = 7375050358) 1.6 mg/dL    1.7-2.4      L     

       

 

             Lab Interpretation (test code = Abnormal                           

    



             19735-2)                                            



[facility]





             Test Item    Value        Reference Range Interpretation Comments

 

             PHOSPHORUS (test code = 3058545422) 4.1 mg/dL    2.5-5.0           

        

 

             Lab Interpretation (test code = Normal                             

    



             70581-8)                                            



[id number] 11:44:15





             Test Item    Value        Reference Range Interpretation Comments

 

             CK (test code = 9811127231) 50 U/L                           

 

             Lab Interpretation (test code = Normal                             

    



             04351-7)                                            



[facility] [id number] 11:44:15





             Test Item    Value        Reference Range Interpretation Comments

 

             CK (test code = 0705999264) 50 U/L                           

 

             Lab Interpretation (test code = Normal                             

    



             61387-3)                                            



[facility] [id number] 11:28:14





             Test Item    Value        Reference Range Interpretation Comments

 

             WBC (test code =              See_Comment                [Automated



             6690-2)                                             message] The sy

stem



                                                                 which generated



                                                                 this result



                                                                 transmitted



                                                                 reference range

:



                                                                 4.20 - 10.70



                                                                 10*3/?L. The



                                                                 reference range

 was



                                                                 not used to



                                                                 interpret this



                                                                 result as



                                                                 normal/abnormal

.

 

             RBC (test code =              See_Comment                [Automated



             559-8)                                              message] The sy

stem



                                                                 which generated



                                                                 this result



                                                                 transmitted



                                                                 reference range

:



                                                                 4.26 - 5.52



                                                                 10*6/?L. The



                                                                 reference range

 was



                                                                 not used to



                                                                 interpret this



                                                                 result as



                                                                 normal/abnormal

.

 

             HGB (test code = 15.0 g/dL    12.2-16.4                 



             718-7)                                              

 

             HCT (test code = 44.6 %       38.4-49.3                 



             4544-3)                                             

 

             MCV (test code = 87.6 fL      81.7-95.6                 



             787-2)                                              

 

             MCH (test code = 29.5 pg      26.1-32.7                 



             785-6)                                              

 

             MCHC (test code = 33.6 g/dL    31.2-35.0                 



             786-4)                                              

 

             RDW-SD (test code = 48.4 fL      38.5-51.6                 



             73766-1)                                            

 

             RDW-CV (test code = 15.1 %       12.1-15.4                 



             788-0)                                              

 

             PLT (test code =              See_Comment                [Automated



             777-3)                                              message] The sy

stem



                                                                 which generated



                                                                 this result



                                                                 transmitted



                                                                 reference range

:



                                                                 150 - 328 10*3/

?L.



                                                                 The reference r

zhen



                                                                 was not used to



                                                                 interpret this



                                                                 result as



                                                                 normal/abnormal

.

 

             MPV (test code = 10.2 fL      9.8-13.0                  



             00484-2)                                            

 

             NRBC/100 WBC (test              See_Comment                [Automat

ed



             code = 9224624999)                                        message] 

The system



                                                                 which generated



                                                                 this result



                                                                 transmitted



                                                                 reference range

:



                                                                 0.0 - 10.0 /100



                                                                 WBCs. The refer

ence



                                                                 range was not u

sed



                                                                 to interpret th

is



                                                                 result as



                                                                 normal/abnormal

.

 

             NRBC x10^3 (test code <0.01        See_Comment                [Auto

mated



             = 4055054815)                                        message] The s

ystem



                                                                 which generated



                                                                 this result



                                                                 transmitted



                                                                 reference range

:



                                                                 10*3/?L. The



                                                                 reference range

 was



                                                                 not used to



                                                                 interpret this



                                                                 result as



                                                                 normal/abnormal

.

 

             GRAN MAT (NEUT) % 67.8 %                                 



             (test code = 770-8)                                        

 

             IMM GRAN % (test code 0.80 %                                 



             = 2263443196)                                        

 

             LYMPH % (test code = 19.8 %                                 



             736-9)                                              

 

             MONO % (test code = 8.7 %                                  



             5905-5)                                             

 

             EOS % (test code = 2.7 %                                  



             713-8)                                              

 

             BASO % (test code = 0.2 %                                  



             706-2)                                              

 

             GRAN MAT x10^3(ANC) 6.56 10*3/uL 1.99-6.95                 



             (test code =                                        



             1377831728)                                         

 

             IMM GRAN x10^3 (test 0.08 10*3/uL 0.00-0.06    H            



             code = 3905620744)                                        

 

             LYMPH x10^3 (test code 1.91 10*3/uL 1.09-3.23                 



             = 731-0)                                            

 

             MONO x10^3 (test code 0.84 10*3/uL 0.36-1.02                 



             = 742-7)                                            

 

             EOS x10^3 (test code = 0.26 10*3/uL 0.06-0.53                 



             711-2)                                              

 

             BASO x10^3 (test code <0.03        0.01-0.09                 



             = 704-7)                                            

 

             Lab Interpretation Abnormal                               



             (test code = 55631-9)                                        



[facility] [id number] 10:56:10





             Test Item    Value        Reference Range Interpretation Comments

 

             IRON (test code = 1852528247) 46 ug/dL            L          

  

 

             TIBC (test code = 9046509747) 299 ug/dL    250-410                 

  

 

             % FE SAT (test code = 2576216763) 15 %         20-50        L      

      

 

             Lab Interpretation (test code = Abnormal                           

    



             87477-8)                                            



[facility] [id number] 10:56:10





             Test Item    Value        Reference Range Interpretation Comments

 

             IRON (test code = 4461096580) 46 ug/dL            L          

  

 

             TIBC (test code = 2687276375) 299 ug/dL    250-410                 

  

 

             % FE SAT (test code = 9401585808) 15 %         20-50        L      

      

 

             Lab Interpretation (test code = Abnormal                           

    



             91340-7)                                            



[facility] [id number] 10:13:03





             Test Item    Value        Reference Range Interpretation Comments

 

             FERRITIN (test code = 89.2 ng/mL                       



             9131172313)                                         

 

             MAL (test code = MAL) Biotin has been                           



                          reported to cause a                           



                          negative bias,                           



                          interpret results                           



                          relative to                            



                          patient's use of                           



                          biotin.                                

 

             Lab Interpretation (test Normal                                 



             code = 75280-2)                                        



[facility] [id number] 10:13:03





             Test Item    Value        Reference Range Interpretation Comments

 

             FERRITIN (test code = 89.2 ng/mL   18.0-464.0                



             6743763422)                                         

 

             MAL (test code = MAL) Biotin has been                           



                          reported to cause a                           



                          negative bias,                           



                          interpret results                           



                          relative to                            



                          patient's use of                           



                          biotin.                                

 

             Lab Interpretation (test Normal                                 



             code = 78348-6)                                        



[facility] [id number] 10:09:06





             Test Item    Value        Reference Range Interpretation Comments

 

             TSH (test code =              See_Comment                [Automated

 message]



             7167949575)                                         The system [employer]



                                                                 generated this 

result



                                                                 transmitted ref

erence



                                                                 range: 0.45 - 4

.70



                                                                 mIU/L. The refe

rence



                                                                 range was not u

sed to



                                                                 interpret this 

result



                                                                 as normal/abnor

mal.

 

             Lab Interpretation (test Normal                                 



             code = 43700-6)                                        



[facility] [id number] 10:09:06





             Test Item    Value        Reference Range Interpretation Comments

 

             TSH (test code =              See_Comment                [Automated

 message]



             9309098409)                                         The system [employer]



                                                                 generated this 

result



                                                                 transmitted ref

erence



                                                                 range: 0.45 - 4

.70



                                                                 mIU/L. The refe

rence



                                                                 range was not u

sed to



                                                                 interpret this 

result



                                                                 as normal/abnor

mal.

 

             Lab Interpretation (test Normal                                 



             code = 45927-9)                                        



[id number] 09:47:44





             Test Item    Value        Reference Range Interpretation Comments

 

             NT-proBNP (test code 12 pg/mL     See_Comment                [Autom

ated



             = 3909563344)                                        message] The



                                                                 system which



                                                                 generated this



                                                                 result



                                                                 transmitted



                                                                 reference range

:



                                                                 <=125. The



                                                                 reference range



                                                                 was not used to



                                                                 interpret this



                                                                 result as



                                                                 normal/abnormal

.

 

             MAL (test code = MAL) Biotin has been                           



                          reported to                            



                          cause a negative                           



                          bias, interpret                           



                          results relative                           



                          to patient's use                           



                          of biotin.                             

 

             Lab Interpretation Normal                                 



             (test code = 87305-7)                                        



[facility][id number][facility]TOTAL CHOLESTEROL, 
TRIGLYCERIDES, HDL)2022 09:35:58





             Test Item    Value        Reference Range Interpretation Comments

 

             CHOL (test code = 250 mg/dL    120-200      H            



             [id number])                                         

 

             HDL (test code = 34 mg/dL     See_Comment  L             [Automated

 message]



             [id number])                                         The system [employer]



                                                                 generated this



                                                                 result transmit

huma



                                                                 reference range

:



                                                                 >=40. The refer

ence



                                                                 range was not u

sed



                                                                 to interpret th

is



                                                                 result as



                                                                 normal/abnormal

.

 

             HDLC RATIO (test code =              See_Comment  H             [Au

tomated message]



             [id number])                                         The system [employer]



                                                                 generated this



                                                                 result transmit

huma



                                                                 reference range

:



                                                                 <=5.0. The refe

rence



                                                                 range was not u

sed



                                                                 to interpret th

is



                                                                 result as



                                                                 normal/abnormal

.

 

             TRIG (test code = 394 mg/dL           H            



             1126905585)                                         

 

             LDL CHOL (test code = 137 mg/dL    See_Comment                [Auto

mated message]



             76611-0)                                            The system [employer]



                                                                 generated this



                                                                 result transmit

huma



                                                                 reference range

:



                                                                 <=160. The refe

rence



                                                                 range was not u

sed



                                                                 to interpret th

is



                                                                 result as



                                                                 normal/abnormal

.

 

             VLDL (test code = 79 mg/dL     5-60         H            



             [id number])                                         

 

             Lab Interpretation (test Abnormal                               



             code = 32950-2)                                        



[facility][id number][facility]TOTAL CHOLESTEROL, 
TRIGLYCERIDES, HDL)2022 09:35:58





             Test Item    Value        Reference Range Interpretation Comments

 

             CHOL (test code = 250 mg/dL    120-200      H            



             7493214293)                                         

 

             HDL (test code = 34 mg/dL     >40          L            



             2550433847)                                         

 

             HDLC RATIO (test code =              See_Comment  H             [Au

tomated message]



             5916656097)                                         The system [employer]



                                                                 generated this



                                                                 result transmit

huma



                                                                 reference range

:



                                                                 <=5.0. The refe

rence



                                                                 range was not u

sed



                                                                 to interpret th

is



                                                                 result as



                                                                 normal/abnormal

.

 

             TRIG (test code = 394 mg/dL           H            



             8326710499)                                         

 

             LDL CHOL (test code = 137 mg/dL    See_Comment                [Auto

mated message]



             69421-6)                                            The system [employer]



                                                                 generated this



                                                                 result transmit

huma



                                                                 reference range

:



                                                                 <=160. The refe

rence



                                                                 range was not u

sed



                                                                 to interpret th

is



                                                                 result as



                                                                 normal/abnormal

.

 

             VLDL (test code = 79 mg/dL     5-60         H            



             4722239713)                                         

 

             Lab Interpretation (test Abnormal                               



             code = 38174-3)                                        



[facility] 09[facility]:42





             Test Item    Value        Reference Range Interpretation Comments

 

             MAGNESIUM (test code = 1647970471) 1.5 mg/dL    1.7-2.4      L     

       

 

             Lab Interpretation (test code = Abnormal                           

    



             82299-0)                                            



[facility] 09:35:42





             Test Item    Value        Reference Range Interpretation Comments

 

             PHOSPHORUS (test code = 3441419948) 3.3 mg/dL    2.5-5.0           

        

 

             Lab Interpretation (test code = Normal                             

    



             98028-9)                                            



[facility] ([id number])2022 
01:29:09





             Test Item    Value        Reference Range Interpretation Comments

 

             NA (test code = 142 mmol/L   135-145                   



             4932895563)                                         

 

             K (test code = 3.7 mmol/L   3.5-5.0                   



             6221451760)                                         

 

             CL (test code = 103 mmol/L                       



             0909697117)                                         

 

             CO2 TOTAL (test code = 27 mmol/L    23-31                     



             0340107499)                                         

 

             AGAP (test code =              2-16                      



             2009963646)                                         

 

             BUN (test code = 11 mg/dL     7-23                      



             9396698233)                                         

 

             GLUCOSE (test code = 166 mg/dL           H            



             0933525856)                                         

 

             CREATININE (test code = 0.61 mg/dL   0.60-1.25                 



             8147508452)                                         

 

             TOTAL BILI (test code = 0.8 mg/dL    0.1-1.1                   



             6961458252)                                         

 

             CALCIUM (test code = 9.3 mg/dL    8.6-10.6                  



             1297720536)                                         

 

             T PROTEIN (test code = 7.4 g/dL     6.3-8.2                   



             6410952247)                                         

 

             ALBUMIN (test code = 4.0 g/dL     3.5-5.0                   



             8201442860)                                         

 

             ALK PHOS (test code = 164 U/L             H            



             8042642555)                                         

 

             ALTv (test code = 149 U/L      5-50         H            



             1742-6)                                             

 

             AST(SGOT) (test code = 29 U/L       13-40                     



             5148273053)                                         

 

             eGFR (test code =              mL/min/1.73m2              



             7350148358)                                         

 

             MAL (test code = MAL) Association of                           



                          Glomerular Filtration                           



                          Rate (GFR) and Staging                           



                          of Kidney Disease*                           



                          +---------------------                           



                          --+-------------------                           



                          --+-------------------                           



                          ------+| GFR                           



                          (mL/min/1.73 m2) ?|                           



                          With Kidney Damage ?|                           



                          ?Without Kidney                           



                          Damage+---------------                           



                          --------+-------------                           



                          --------+-------------                           



                          ------------+| ?>90 ?                           



                          ? ? ? ? ? ? ? ?|                           



                          ?Stage one ? ? ? ? ?|                           



                          ? Normal ? ? ? ? ? ? ?                           



                          ?+--------------------                           



                          ---+------------------                           



                          ---+------------------                           



                          -------+| ?60-89 ? ? ?                           



                          ? ? ? ? ?| ?Stage two                           



                          ? ? ? ? ?| ? Decreased                           



                          GFR ? ? ? ?                            



                          +---------------------                           



                          --+-------------------                           



                          --+-------------------                           



                          ------+| ?30-59 ? ? ?                           



                          ? ? ? ? ?| ?Stage                           



                          three ? ? ? ?| ? Stage                           



                          three ? ? ? ? ?                           



                          +---------------------                           



                          --+-------------------                           



                          --+-------------------                           



                          ------+| ?15-29 ? ? ?                           



                          ? ? ? ? ?| ?Stage four                           



                          ? ? ? ? | ? Stage four                           



                          ? ? ? ? ?                              



                          ?+--------------------                           



                          ---+------------------                           



                          ---+------------------                           



                          -------+| ?<15 (or                           



                          dialysis) ? ?| ?Stage                           



                          five ? ? ? ? | ? Stage                           



                          five ? ? ? ? ?                           



                          ?+--------------------                           



                          ---+------------------                           



                          ---+------------------                           



                          -------+ *Each stage                           



                          assumes the associated                           



                          GFR level has been in                           



                          effect for at least                           



                          three months. ?Stages                           



                          1 to 5, with or                           



                          without kidney                           



                          disease, indicate                           



                          chronic kidney                           



                          disease. Notes:                           



                          Determination of                           



                          stages one and two                           



                          (with eGFR                             



                          >59mL/min/1.73 m2)                           



                          requires estimation of                           



                          kidney damage for at                           



                          least three months as                           



                          defined by structural                           



                          or functional                           



                          abnormalities of the                           



                          kidney, manifested by                           



                          either:Pathological                           



                          abnormalities or                           



                          Markers of kidney                           



                          damage (including                           



                          abnormalities in the                           



                          composition of the                           



                          blood or urine or                           



                          abnormalities in                           



                          imaging tests).                           

 

             Lab Interpretation Abnormal                               



             (test code = 07089-7)                                        



[facility] THRM[facility] [id number] 
01:23:46





             Test Item    Value        Reference Range Interpretation Comments

 

             APTT Patient (test              See_Comment                [Automat

ed



             code = 3173-2)                                        message] The



                                                                 system which



                                                                 generated this



                                                                 result



                                                                 transmitted



                                                                 reference range

:



                                                                 23 - 38 Seconds

.



                                                                 The reference



                                                                 range was not



                                                                 used to interpr

et



                                                                 this result as



                                                                 normal/abnormal

.

 

             MAL (test code = MAL) The [facility] patient                           



                          population mean                           



                          normal value for                           



                          aPTT is 30                             



                          seconds.                               

 

             Lab Interpretation Normal                                 



             (test code = 65709-5)                                        



[facility]PROT[facility] / [id number] 01:21:51





             Test Item    Value        Reference Range Interpretation Comments

 

             PROTIME PATIENT (test              See_Comment                [Auto

mated message]



             code = 5964-2)                                        The system wh

ich



                                                                 generated this 

result



                                                                 transmitted ref

erence



                                                                 range: 12.0 - 1

4.7



                                                                 Seconds. The re

ference



                                                                 range was not u

sed to



                                                                 interpret this 

result



                                                                 as normal/abnor

mal.

 

             INR (test code = 6301-6)                                        Nor

mal INR <1.1;



                                                                 Warfarin Therap

eutic



                                                                 range 2.0 to 3.

0 or



                                                                 2.5 to 3.5, dep

ending



                                                                 upon the indica

tions.

 

             Lab Interpretation (test Normal                                 



             code = 75475-9)                                        



[facility] HospitalCBC WITH [id number] 01:15:28





             Test Item    Value        Reference Range Interpretation Comments

 

             WBC (test code =              See_Comment  H             [Automated



             9590-2)                                             message] The sy

stem



                                                                 which generated



                                                                 this result



                                                                 transmitted



                                                                 reference range

:



                                                                 4.20 - 10.70



                                                                 10*3/?L. The



                                                                 reference range

 was



                                                                 not used to



                                                                 interpret this



                                                                 result as



                                                                 normal/abnormal

.

 

             RBC (test code =              See_Comment  H             [Automated



             369-8)                                              message] The sy

stem



                                                                 which generated



                                                                 this result



                                                                 transmitted



                                                                 reference range

:



                                                                 4.26 - 5.52



                                                                 10*6/?L. The



                                                                 reference range

 was



                                                                 not used to



                                                                 interpret this



                                                                 result as



                                                                 normal/abnormal

.

 

             HGB (test code = 16.5 g/dL    12.2-16.4    H            



             718-7)                                              

 

             HCT (test code = 49.1 %       38.4-49.3                 



             4544-3)                                             

 

             MCV (test code = 87.7 fL      81.7-95.6                 



             787-2)                                              

 

             MCH (test code = 29.5 pg      26.1-32.7                 



             785-6)                                              

 

             MCHC (test code = 33.6 g/dL    31.2-35.0                 



             786-4)                                              

 

             RDW-SD (test code = 48.4 fL      38.5-51.6                 



             02567-0)                                            

 

             RDW-CV (test code = 15.1 %       12.1-15.4                 



             788-0)                                              

 

             PLT (test code =              See_Comment                [Automated



             777-3)                                              message] The sy

stem



                                                                 which generated



                                                                 this result



                                                                 transmitted



                                                                 reference range

:



                                                                 150 - 328 10*3/

?L.



                                                                 The reference r

[place]



                                                                 was not used to



                                                                 interpret this



                                                                 result as



                                                                 normal/abnormal

.

 

             MPV (test code = 10.1 fL      9.8-13.0                  



             69332-7)                                            

 

             NRBC/100 WBC (test              See_Comment                [Automat

ed



             code = 8570078317)                                        message] 

The system



                                                                 which generated



                                                                 this result



                                                                 transmitted



                                                                 reference range

:



                                                                 0.0 - 10.0 /100



                                                                 WBCs. The refer

ence



                                                                 range was not u

sed



                                                                 to interpret th

is



                                                                 result as



                                                                 normal/abnormal

.

 

             NRBC x10^3 (test code <0.01        See_Comment                [Auto

mated



             = [id number])                                        message] The s

ystem



                                                                 which generated



                                                                 this result



                                                                 transmitted



                                                                 reference range

:



                                                                 10*3/?L. The



                                                                 reference range

 was



                                                                 not used to



                                                                 interpret this



                                                                 result as



                                                                 normal/abnormal

.

 

             GRAN MAT (NEUT) % 72.7 %                                 



             (test code = 770-8)                                        

 

             IMM GRAN % (test code 0.60 %                                 



             = [id number])                                        

 

             LYMPH % (test code = 15.5 %                                 



             736-9)                                              

 

             MONO % (test code = 8.2 %                                  



             5905-5)                                             

 

             EOS % (test code = 2.6 %                                  



             713-8)                                              

 

             BASO % (test code = 0.4 %                                  



             706-2)                                              

 

             GRAN MAT x10^3(ANC) 7.83 10*3/uL 1.99-6.95    H            



             (test code =                                        



             0128001612)                                         

 

             IMM GRAN x10^3 (test 0.07 10*3/uL 0.00-0.06    H            



             code = [id number])                                        

 

             LYMPH x10^3 (test code 1.67 10*3/uL 1.09-3.23                 



             = 731-0)                                            

 

             MONO x10^3 (test code 0.88 10*3/uL 0.36-1.02                 



             = 742-7)                                            

 

             EOS x10^3 (test code = 0.28 10*3/uL 0.06-0.53                 



             711-2)                                              

 

             BASO x10^3 (test code 0.04 10*3/uL 0.01-0.09                 



             = 704-7)                                            

 

             Lab Interpretation Abnormal                               



             (test code = 71912-9)                                        



[facility]:14:32





             Test Item    Value        Reference Range Interpretation Comments

 

             LACTIC ACID (test code = 1.86 mmol/L  0.50-2.20                 



             [id number])                                         

 

             Lab Interpretation (test code = Normal                             

    



             78603-8)                                            



[facility] [id number] 12:58:00





             Test Item    Value        Reference Range Interpretation Comments

 

             Glucose Lvl (test code = Glucose Lvl) 228          70-99           

          



[facility]2022-04-12 12:58:00





             Test Item    Value        Reference Range Interpretation Comments

 

             BUN (test code = BUN) 23           7-22                      



[facility]2022-04-12 12:58:00





             Test Item    Value        Reference Range Interpretation Comments

 

             Creatinine Lvl (test code = Creatinine 0.78         0.50-1.40      

           



             Lvl)                                                



[facility]2022-04-12 12:58:00





             Test Item    Value        Reference Range Interpretation Comments

 

             Sodium Lvl (test code = Sodium Lvl) 138          135-145           

        



[facility]2022-04-12 12:58:00





             Test Item    Value        Reference Range Interpretation Comments

 

             Potassium Lvl (test code = Potassium 4.6          3.5-5.1          

         



             Lvl)                                                



[facility]2022-04-12 12:58:00





             Test Item    Value        Reference Range Interpretation Comments

 

             Chloride Lvl (test code = Chloride Lvl) 108                  

            



[facility]2022-04-12 12:58:00





             Test Item    Value        Reference Range Interpretation Comments

 

             CO2 (test code = CO2) 23           24-32                     



[facility]2022-04-12 12:58:00





             Test Item    Value        Reference Range Interpretation Comments

 

             Calcium Lvl (test code = Calcium Lvl) 9.0          8.5-10.5        

          



[facility]2022-04-12 12:58:00





             Test Item    Value        Reference Range Interpretation Comments

 

             AGAP (test code = AGAP) 11.6         10.0-20.0                 



[facility]2022-04-12 12:58:00





             Test Item    Value        Reference Range Interpretation Comments

 

             eGFR (test code = eGFR) 116                                    



[facility] Center[facility]rsvojrSLCLLOEZZU8340-53-24 12:58:00





             Test Item    Value        Reference Range Interpretation Comments

 

             WBC (test code = WBC) 10.5         3.7-10.4                  



[facility] CenterAimspqsIYKYSHDKHA3937-04-78 12:58:00





             Test Item    Value        Reference Range Interpretation Comments

 

             RBC (test code = RBC) 5.48         4.70-6.10                 



[facility] CenterKatginwMFOGEJUFHJ3891-15-65 12:58:00





             Test Item    Value        Reference Range Interpretation Comments

 

             Hgb (test code = Hgb) 16.0         14.0-18.0                 



[facility] CenterLvvsmquGAJFSBTTDW4511-71-97 12:58:00





             Test Item    Value        Reference Range Interpretation Comments

 

             Hct (test code = Hct) 49.8         42.0-54.0                 



[facility] CenterIlzejpnBTVOUHASGL8564-97-35 12:58:00





             Test Item    Value        Reference Range Interpretation Comments

 

             MCV (test code = MCV) 90.8         80.0-94.0                 



[facility] CenterXqsnjimRUUNBPCDIF1231-41-65 12:58:00





             Test Item    Value        Reference Range Interpretation Comments

 

             MCH (test code = MCH) 29.1 pg      27.0-31.0                 



[facility] CenterInkjaihPIMDFNGGIO5408-80-87 12:58:00





             Test Item    Value        Reference Range Interpretation Comments

 

             MCHC (test code = MCHC) 32.1         32.0-36.0                 



[facility] CenterYdrfxjpZXKPGMLZJN3163-88-93 12:58:00





             Test Item    Value        Reference Range Interpretation Comments

 

             RDW (test code = RDW) 15.5         11.5-14.5                 



[facility] CenterVdrkuitAHBDLWMJNO4313-63-50 12:58:00





             Test Item    Value        Reference Range Interpretation Comments

 

             Platelet (test code = Platelet) 312          133-450               

    



[facility] CenterXtdicfgBDBPUWHMDI8586-41-32 12:58:00





             Test Item    Value        Reference Range Interpretation Comments

 

             MPV (test code = MPV) 8.3          7.4-10.4                  



[facility] CenterBuzyxceFFKBGUBTOF2702-48-97 12:58:00





             Test Item    Value        Reference Range Interpretation Comments

 

             PT (test code = PT) 12.9 s       12.0-14.7                 



[facility] CenterZfwkkdmWPZDAIOOKB5988-70-92 12:58:00





             Test Item    Value        Reference Range Interpretation Comments

 

             INR (test code = INR) 0.98 1       0.85-1.17                 



[place] [address]-04-12 12:58:00





             Test Item    Value        Reference Range Interpretation Comments

 

             PTT (test code = PTT) 27.3 s       22.9-35.8                 



[place] 726962-04-12 12:58:00





             Test Item    Value        Reference Range Interpretation Comments

 

             Segs (test code = Segs) 71.2         45.0-75.0                 



[place] [address]-04-12 12:58:00





             Test Item    Value        Reference Range Interpretation Comments

 

             Lymphocytes (test code = Lymphocytes) 21.1         20.0-40.0       

          



[place] [address]-04-12 12:58:00





             Test Item    Value        Reference Range Interpretation Comments

 

             Monocytes (test code = Monocytes) 6.8          2.0-12.0            

      



[place] [address]-04-12 12:58:00





             Test Item    Value        Reference Range Interpretation Comments

 

             Eosinophils (test code = 0.1          See_Comment                [A

utomated message] The



             Eosinophils)                                        system which ge

nerated



                                                                 this result tra

nsmitted



                                                                 reference range

: <=4.0.



                                                                 The reference r

zhen was



                                                                 not used to int

erpret



                                                                 this result as



                                                                 normal/abnormal

.



[facility] CenterUmgrrnpHASVLIHOUW2371-76-48 12:58:00





             Test Item    Value        Reference Range Interpretation Comments

 

             Basophils (test code = 0.8          See_Comment                [Aut

omated message] The



             Basophils)                                          system which ge

nerated



                                                                 this result tra

nsmitted



                                                                 reference range

: <=1.0.



                                                                 The reference r

zhen was



                                                                 not used to int

erpret



                                                                 this result as



                                                                 normal/abnormal

.



[facility] CenterTfnosxxFFKWJFXWKW3801-82-08 12:58:00





             Test Item    Value        Reference Range Interpretation Comments

 

             Neutrophils # (test code = Neutrophils 7.5          1.5-8.1        

           



             #)                                                  



[place] 726962-04-12 12:58:00





             Test Item    Value        Reference Range Interpretation Comments

 

             Lymphocytes # (test code = Lymphocytes 2.2          1.0-5.5        

           



             #)                                                  



[place] [address]-04-12 12:58:00





             Test Item    Value        Reference Range Interpretation Comments

 

             Monocytes # (test code 0.7          See_Comment                [Aut

omated message] The



             = Monocytes #)                                        system which 

generated



                                                                 this result tra

nsmitted



                                                                 reference range

: <=0.8.



                                                                 The reference r

zhen was



                                                                 not used to int

erpret



                                                                 this result as



                                                                 normal/abnormal

.



[place] [address]-04-12 12:58:00





             Test Item    Value        Reference Range Interpretation Comments

 

             Basophils # (test code 0.1          See_Comment                [Aut

omated message] The



             = Basophils #)                                        system which 

generated



                                                                 this result tra

nsmitted



                                                                 reference range

: <=0.2.



                                                                 The reference r

zhen was



                                                                 not used to int

erpret



                                                                 this result as



                                                                 normal/abnormal

.



[facility]2022-04-12 12:58:00





             Test Item    Value        Reference Range Interpretation Comments

 

             Glucose Lvl (test code = Glucose Lvl) 228          70-99           

          



[facility]2022-04-12 12:58:00





             Test Item    Value        Reference Range Interpretation Comments

 

             BUN (test code = BUN) 23           7-22                      



[facility]2022-04-12 12:58:00





             Test Item    Value        Reference Range Interpretation Comments

 

             Creatinine Lvl (test code = Creatinine 0.78         0.50-1.40      

           



             Lvl)                                                



[facility]2022-04-12 12:58:00





             Test Item    Value        Reference Range Interpretation Comments

 

             Sodium Lvl (test code = Sodium Lvl) 138          135-145           

        



[facility]2022-04-12 12:58:00





             Test Item    Value        Reference Range Interpretation Comments

 

             Potassium Lvl (test code = Potassium 4.6          3.5-5.1          

         



             Lvl)                                                



[facility]2022-04-12 12:58:00





             Test Item    Value        Reference Range Interpretation Comments

 

             Chloride Lvl (test code = Chloride Lvl) 108                  

            



[facility]2022-04-12 12:58:00





             Test Item    Value        Reference Range Interpretation Comments

 

             CO2 (test code = CO2) 23           24-32                     



[facility]2022-04-12 12:58:00





             Test Item    Value        Reference Range Interpretation Comments

 

             Calcium Lvl (test code = Calcium Lvl) 9.0          8.5-10.5        

          



[facility]2022-04-12 12:58:00





             Test Item    Value        Reference Range Interpretation Comments

 

             AGAP (test code = AGAP) 11.6         10.0-20.0                 



[facility]2022-04-12 12:58:00





             Test Item    Value        Reference Range Interpretation Comments

 

             eGFR (test code = eGFR) 116                                    



[facility] CenterYwirpdnQOFWWQHTTY9790-02-79 12:58:00





             Test Item    Value        Reference Range Interpretation Comments

 

             WBC (test code = WBC) 10.5         3.7-10.4                  



[place] [address]-04-12 12:58:00





             Test Item    Value        Reference Range Interpretation Comments

 

             RBC (test code = RBC) 5.48         4.70-6.10                 



[place] 726962-04-12 12:58:00





             Test Item    Value        Reference Range Interpretation Comments

 

             Hgb (test code = Hgb) 16.0         14.0-18.0                 



[place] [address]-04-12 12:58:00





             Test Item    Value        Reference Range Interpretation Comments

 

             Hct (test code = Hct) 49.8         42.0-54.0                 



[place] 726962-04-12 12:58:00





             Test Item    Value        Reference Range Interpretation Comments

 

             MCV (test code = MCV) 90.8         80.0-94.0                 



[place] [address]-04-12 12:58:00





             Test Item    Value        Reference Range Interpretation Comments

 

             MCH (test code = MCH) 29.1 pg      27.0-31.0                 



[place] 726962-04-12 12:58:00





             Test Item    Value        Reference Range Interpretation Comments

 

             MCHC (test code = MCHC) 32.1         32.0-36.0                 



[place] [address]-04-12 12:58:00





             Test Item    Value        Reference Range Interpretation Comments

 

             RDW (test code = RDW) 15.5         11.5-14.5                 



[place] [address]-04-12 12:58:00





             Test Item    Value        Reference Range Interpretation Comments

 

             Platelet (test code = Platelet) 312          133-450               

    



[facility] CenterLnieaapOPTXJZPFZZ8090-12-72 12:58:00





             Test Item    Value        Reference Range Interpretation Comments

 

             MPV (test code = MPV) 8.3          7.4-10.4                  



[place] 726962-04-12 12:58:00





             Test Item    Value        Reference Range Interpretation Comments

 

             PT (test code = PT) 12.9 s       12.0-14.7                 



[place] [address]-04-12 12:58:00





             Test Item    Value        Reference Range Interpretation Comments

 

             INR (test code = INR) 0.98 1       0.85-1.17                 



[place] [address]-04-12 12:58:00





             Test Item    Value        Reference Range Interpretation Comments

 

             PTT (test code = PTT) 27.3 s       22.9-35.8                 



[place] 726962-04-12 12:58:00





             Test Item    Value        Reference Range Interpretation Comments

 

             Segs (test code = Segs) 71.2         45.0-75.0                 



[place] [address]-04-12 12:58:00





             Test Item    Value        Reference Range Interpretation Comments

 

             Lymphocytes (test code = Lymphocytes) 21.1         20.0-40.0       

          



[address]04-12 12:58:00





             Test Item    Value        Reference Range Interpretation Comments

 

             Monocytes (test code = Monocytes) 6.8          2.0-12.0            

      



[place] [address]-04-12 12:58:00





             Test Item    Value        Reference Range Interpretation Comments

 

             Eosinophils (test code = 0.1          See_Comment                [A

utomated message] The



             Eosinophils)                                        system which ge

nerated



                                                                 this result tra

nsmitted



                                                                 reference range

: <=4.0.



                                                                 The reference r

zhen was



                                                                 not used to int

erpret



                                                                 this result as



                                                                 normal/abnormal

.



[address]04[address]12 12:58:00





             Test Item    Value        Reference Range Interpretation Comments

 

             Basophils (test code = 0.8          See_Comment                [Aut

omated message] The



             Basophils)                                          system which ge

nerated



                                                                 this result tra

nsmitted



                                                                 reference range

: <=1.0.



                                                                 The reference r

zhen was



                                                                 not used to int

erpret



                                                                 this result as



                                                                 normal/abnormal

.



[place] [address]-04-12 12:58:00





             Test Item    Value        Reference Range Interpretation Comments

 

             Neutrophils # (test code = Neutrophils 7.5          1.5-8.1        

           



             #)                                                  



[place] [address]-04-12 12:58:00





             Test Item    Value        Reference Range Interpretation Comments

 

             Lymphocytes # (test code = Lymphocytes 2.2          1.0-5.5        

           



             #)                                                  



[place] [address]-04-12 12:58:00





             Test Item    Value        Reference Range Interpretation Comments

 

             Monocytes # (test code 0.7          See_Comment                [Aut

omated message] The



             = Monocytes #)                                        system which 

generated



                                                                 this result tra

nsmitted



                                                                 reference range

: <=0.8.



                                                                 The reference r

zhen was



                                                                 not used to int

erpret



                                                                 this result as



                                                                 normal/abnormal

.



[place] [address]-04-12 12:58:00





             Test Item    Value        Reference Range Interpretation Comments

 

             Basophils # (test code 0.1          See_Comment                [Aut

omated message] The



             = Basophils #)                                        system which 

generated



                                                                 this result tra

nsmitted



                                                                 reference range

: <=0.2.



                                                                 The reference r

zhen was



                                                                 not used to int

erpret



                                                                 this result as



                                                                 normal/abnormal

.



[facility]2022-04-12 12:58:00





             Test Item    Value        Reference Range Interpretation Comments

 

             Glucose Lvl (test code = Glucose Lvl) 228          70-99           

          



[place] [address]122-04-12 12:58:00





             Test Item    Value        Reference Range Interpretation Comments

 

             BUN (test code = BUN) 23           7-22                      



[place] 805122-04-12 12:58:00





             Test Item    Value        Reference Range Interpretation Comments

 

             Creatinine Lvl (test code = Creatinine 0.78         0.50-1.40      

           



             Lvl)                                                



[place] 805122-04-12 12:58:00





             Test Item    Value        Reference Range Interpretation Comments

 

             Sodium Lvl (test code = Sodium Lvl) 138          135-145           

        



[place] 805122-04-12 12:58:00





             Test Item    Value        Reference Range Interpretation Comments

 

             Potassium Lvl (test code = Potassium 4.6          3.5-5.1          

         



             Lvl)                                                



[facility]2022-04-12 12:58:00





             Test Item    Value        Reference Range Interpretation Comments

 

             Chloride Lvl (test code = Chloride Lvl) 108                  

            



[place] 805122-04-12 12:58:00





             Test Item    Value        Reference Range Interpretation Comments

 

             CO2 (test code = CO2) 23           24-32                     



[place] 805122-04-12 12:58:00





             Test Item    Value        Reference Range Interpretation Comments

 

             Calcium Lvl (test code = Calcium Lvl) 9.0          8.5-10.5        

          



[place] 805122-04-12 12:58:00





             Test Item    Value        Reference Range Interpretation Comments

 

             AGAP (test code = AGAP) 11.6         10.0-20.0                 



[place] 805122-04-12 12:58:00





             Test Item    Value        Reference Range Interpretation Comments

 

             eGFR (test code = eGFR) 116                                    



[place] 726962-04-12 12:58:00





             Test Item    Value        Reference Range Interpretation Comments

 

             WBC (test code = WBC) 10.5         3.7-10.4                  



[place] 726962-04-12 12:58:00





             Test Item    Value        Reference Range Interpretation Comments

 

             RBC (test code = RBC) 5.48         4.70-6.10                 



[place] 726962-04-12 12:58:00





             Test Item    Value        Reference Range Interpretation Comments

 

             Hgb (test code = Hgb) 16.0         14.0-18.0                 



[place] 726962-04-12 12:58:00





             Test Item    Value        Reference Range Interpretation Comments

 

             Hct (test code = Hct) 49.8         42.0-54.0                 



[place] 726962-04-12 12:58:00





             Test Item    Value        Reference Range Interpretation Comments

 

             MCV (test code = MCV) 90.8         80.0-94.0                 



[place] 726962-04-12 12:58:00





             Test Item    Value        Reference Range Interpretation Comments

 

             MCH (test code = MCH) 29.1 pg      27.0-31.0                 



[place] 726962-04-12 12:58:00





             Test Item    Value        Reference Range Interpretation Comments

 

             MCHC (test code = MCHC) 32.1         32.0-36.0                 



[place] 726962-04-12 12:58:00





             Test Item    Value        Reference Range Interpretation Comments

 

             RDW (test code = RDW) 15.5         11.5-14.5                 



[place] 726962-04-12 12:58:00





             Test Item    Value        Reference Range Interpretation Comments

 

             Platelet (test code = Platelet) 312          133-450               

    



[facility]mviaiBSUKPLEUUG9746[facility]34[facility]44 12:58:00





             Test Item    Value        Reference Range Interpretation Comments

 

             MPV (test code = MPV) 8.3          7.4-10.4                  



[place] [address]-04-12 12:58:00





             Test Item    Value        Reference Range Interpretation Comments

 

             PT (test code = PT) 12.9 s       12.0-14.7                 



[place] [address]-04-12 12:58:00





             Test Item    Value        Reference Range Interpretation Comments

 

             INR (test code = INR) 0.98 1       0.85-1.17                 



[place] [address]-04-12 12:58:00





             Test Item    Value        Reference Range Interpretation Comments

 

             PTT (test code = PTT) 27.3 s       22.9-35.8                 



[place] [address]-04-12 12:58:00





             Test Item    Value        Reference Range Interpretation Comments

 

             Segs (test code = Segs) 71.2         45.0-75.0                 



[place] [address]-04-12 12:58:00





             Test Item    Value        Reference Range Interpretation Comments

 

             Lymphocytes (test code = Lymphocytes) 21.1         20.0-40.0       

          



[place] 726962-04-12 12:58:00





             Test Item    Value        Reference Range Interpretation Comments

 

             Monocytes (test code = Monocytes) 6.8          2.0-12.0            

      



[place] 726962-04-12 12:58:00





             Test Item    Value        Reference Range Interpretation Comments

 

             Eosinophils (test code = 0.1          See_Comment                [A

utomated message] The



             Eosinophils)                                        system which ge

nerated



                                                                 this result tra

nsmitted



                                                                 reference range

: <=4.0.



                                                                 The reference r

zhen was



                                                                 not used to int

erpret



                                                                 this result as



                                                                 normal/abnormal

.



[place] 726962-04-12 12:58:00





             Test Item    Value        Reference Range Interpretation Comments

 

             Basophils (test code = 0.8          See_Comment                [Aut

omated message] The



             Basophils)                                          system which ge

nerated



                                                                 this result tra

nsmitted



                                                                 reference range

: <=1.0.



                                                                 The reference r

zhen was



                                                                 not used to int

erpret



                                                                 this result as



                                                                 normal/abnormal

.



[place] 726962-04-12 12:58:00





             Test Item    Value        Reference Range Interpretation Comments

 

             Neutrophils # (test code = Neutrophils 7.5          1.5-8.1        

           



             #)                                                  



[place] 726962-04-12 12:58:00





             Test Item    Value        Reference Range Interpretation Comments

 

             Lymphocytes # (test code = Lymphocytes 2.2          1.0-5.5        

           



             #)                                                  



[place] 726962-04-12 12:58:00





             Test Item    Value        Reference Range Interpretation Comments

 

             Monocytes # (test code 0.7          See_Comment                [Aut

omated message] The



             = Monocytes #)                                        system which 

generated



                                                                 this result tra

nsmitted



                                                                 reference range

: <=0.8.



                                                                 The reference r

zhen was



                                                                 not used to int

erpret



                                                                 this result as



                                                                 normal/abnormal

.



[place] [address]-04-12 12:58:00





             Test Item    Value        Reference Range Interpretation Comments

 

             Basophils # (test code 0.1          See_Comment                [Aut

omated message] The



             = Basophils #)                                        system which 

generated



                                                                 this result tra

nsmitted



                                                                 reference range

: <=0.2.



                                                                 The reference r

zhen was



                                                                 not used to int

erpret



                                                                 this result as



                                                                 normal/abnormal

.



[facility] [id number] 12[facility]58[facility]





             Test Item    Value        Reference Range Interpretation Comments

 

             Glucose Lvl (test code = Glucose Lvl) 228          70-99           

          



[place] 805122-04-12 12:58:00





             Test Item    Value        Reference Range Interpretation Comments

 

             BUN (test code = BUN) 23           7-22                      



[place] 805122-04-12 12:58:00





             Test Item    Value        Reference Range Interpretation Comments

 

             Creatinine Lvl (test code = Creatinine 0.78         0.50-1.40      

           



             Lvl)                                                



[place] 805122-04-12 12:58:00





             Test Item    Value        Reference Range Interpretation Comments

 

             Sodium Lvl (test code = Sodium Lvl) 138          135-145           

        



[place] 805122-04-12 12:58:00





             Test Item    Value        Reference Range Interpretation Comments

 

             Potassium Lvl (test code = Potassium 4.6          3.5-5.1          

         



             Lvl)                                                



[place] 805122-04-12 12:58:00





             Test Item    Value        Reference Range Interpretation Comments

 

             Chloride Lvl (test code = Chloride Lvl) 108                  

            



[place] 805122-04-12 12:58:00





             Test Item    Value        Reference Range Interpretation Comments

 

             CO2 (test code = CO2) 23           24-32                     



[place] 805122-04-12 12:58:00





             Test Item    Value        Reference Range Interpretation Comments

 

             Calcium Lvl (test code = Calcium Lvl) 9.0          8.5-10.5        

          



[place] 805122-04-12 12:58:00





             Test Item    Value        Reference Range Interpretation Comments

 

             AGAP (test code = AGAP) 11.6         10.0-20.0                 



[place] 805122-04-12 12:58:00





             Test Item    Value        Reference Range Interpretation Comments

 

             eGFR (test code = eGFR) 116                                    



[place] 726962-04-12 12:58:00





             Test Item    Value        Reference Range Interpretation Comments

 

             WBC (test code = WBC) 10.5         3.7-10.4                  



[place] [address]-04-12 12:58:00





             Test Item    Value        Reference Range Interpretation Comments

 

             RBC (test code = RBC) 5.48         4.70-6.10                 



[place] [address]-04-12 12:58:00





             Test Item    Value        Reference Range Interpretation Comments

 

             Hgb (test code = Hgb) 16.0         14.0-18.0                 



[place] [address]-04-12 12:58:00





             Test Item    Value        Reference Range Interpretation Comments

 

             Hct (test code = Hct) 49.8         42.0-54.0                 



[place] 726962-04-12 12:58:00





             Test Item    Value        Reference Range Interpretation Comments

 

             MCV (test code = MCV) 90.8         80.0-94.0                 



[place] 726962-04-12 12:58:00





             Test Item    Value        Reference Range Interpretation Comments

 

             MCH (test code = MCH) 29.1 pg      27.0-31.0                 



[place] 726962-04-12 12:58:00





             Test Item    Value        Reference Range Interpretation Comments

 

             MCHC (test code = MCHC) 32.1         32.0-36.0                 



[place] 726962-04-12 12:58:00





             Test Item    Value        Reference Range Interpretation Comments

 

             RDW (test code = RDW) 15.5         11.5-14.5                 



[place] 726962-04-12 12:58:00





             Test Item    Value        Reference Range Interpretation Comments

 

             Platelet (test code = Platelet) 312          133-450               

    



[facility] [id number] 12:58:00





             Test Item    Value        Reference Range Interpretation Comments

 

             MPV (test code = MPV) 8.3          7.4-10.4                  



[place] 726962-04-12 12:58:00





             Test Item    Value        Reference Range Interpretation Comments

 

             PT (test code = PT) 12.9 s       12.0-14.7                 



[place] [address]-04-12 12:58:00





             Test Item    Value        Reference Range Interpretation Comments

 

             INR (test code = INR) 0.98 1       0.85-1.17                 



[place] 726962-04-12 12:58:00





             Test Item    Value        Reference Range Interpretation Comments

 

             PTT (test code = PTT) 27.3 s       22.9-35.8                 



[place] [address]-04-12 12:58:00





             Test Item    Value        Reference Range Interpretation Comments

 

             Segs (test code = Segs) 71.2         45.0-75.0                 



[place] [address]-04-12 12:58:00





             Test Item    Value        Reference Range Interpretation Comments

 

             Lymphocytes (test code = Lymphocytes) 21.1         20.0-40.0       

          



[place] 726962-04-12 12:58:00





             Test Item    Value        Reference Range Interpretation Comments

 

             Monocytes (test code = Monocytes) 6.8          2.0-12.0            

      



[place] 726962-04-12 12:58:00





             Test Item    Value        Reference Range Interpretation Comments

 

             Eosinophils (test code = 0.1          See_Comment                [A

utomated message] The



             Eosinophils)                                        system which ge

nerated



                                                                 this result tra

nsmitted



                                                                 reference range

: <=4.0.



                                                                 The reference r

zhen was



                                                                 not used to int

erpret



                                                                 this result as



                                                                 normal/abnormal

.



[facility] C[id number] 12:58:00





             Test Item    Value        Reference Range Interpretation Comments

 

             Basophils (test code = 0.8          See_Comment                [Aut

omated message] The



             Basophils)                                          system which ge

nerated



                                                                 this result tra

nsmitted



                                                                 reference range

: <=1.0.



                                                                 The reference r

zhen was



                                                                 not used to int

erpret



                                                                 this result as



                                                                 normal/abnormal

.



[place] 726962-04-12 12:58:00





             Test Item    Value        Reference Range Interpretation Comments

 

             Neutrophils # (test code = Neutrophils 7.5          1.5-8.1        

           



             #)                                                  



[facility] CenterBxqiebmAUEQYRONZC3031-79-59 12:58:00





             Test Item    Value        Reference Range Interpretation Comments

 

             Lymphocytes # (test code = Lymphocytes 2.2          1.0-5.5        

           



             #)                                                  



[place] 726962-04-12 12:58:00





             Test Item    Value        Reference Range Interpretation Comments

 

             Monocytes # (test code 0.7          See_Comment                [Aut

omated message] The



             = Monocytes #)                                        system which 

generated



                                                                 this result tra

nsmitted



                                                                 reference range

: <=0.8.



                                                                 The reference r

zhen was



                                                                 not used to int

erpret



                                                                 this result as



                                                                 normal/abnormal

.



[facility] [id number] 12:58:00





             Test Item    Value        Reference Range Interpretation Comments

 

             Basophils # (test code 0.1          See_Comment                [Aut

omated message] The



             = Basophils #)                                        system which 

generated



                                                                 this result tra

nsmitted



                                                                 reference range

: <=0.2.



                                                                 The reference r

zhen was



                                                                 not used to int

erpret



                                                                 this result as



                                                                 normal/abnormal

.



[facility] CenterB[facility] [id number] 11[facility]59:[facility]





             Test Item    Value        Reference Range Interpretation Comments

 

             Tube Num CSF (test code = Tube Num CSF) 3 1                        

            



[facility] [id number] 11:59:00





             Test Item    Value        Reference Range Interpretation Comments

 

             Color CSF (test code Colorless (22 6:59                       

    



             = Color CSF) AM)                                    



[place] 402452-04-12 11:59:00





             Test Item    Value        Reference Range Interpretation Comments

 

             Clarity CSF (test code = Clear (22 6:59                       

    



             Clarity CSF) AM)                                    



[facility]2022-04-12 11:59:00





             Test Item    Value        Reference Range Interpretation Comments

 

             Supernat CSF (test Colorless (22 6:59                         

  



             code = Supernat CSF) AM)                                    



[facility]2022-04-12 11:59:00





             Test Item    Value        Reference Range Interpretation Comments

 

             Nucleated Cells CSF 3            See_Comment                [Automa

huma message] The



             (test code = Nucleated                                        syste

m which generated



             Cells CSF)                                          this result tra

nsmitted



                                                                 reference range

: <=53.



                                                                 The reference r

zhen was



                                                                 not used to int

erpret



                                                                 this result as



                                                                 normal/abnormal

.



[facility]2022-04-12 11:59:00





             Test Item    Value        Reference Range Interpretation Comments

 

             RBC CSF (test code = 64           See_Comment                [Autom

ated message] The



             RBC CSF)                                            system which ge

nerated this



                                                                 result transmit

huma



                                                                 reference range

: <=03. The



                                                                 reference range

 was not



                                                                 used to interpr

et this



                                                                 result as zayda

l/abnormal.



[facility]2022-04-12 11:59:00





             Test Item    Value        Reference Range Interpretation Comments

 

             Comment CSF (test Differential not performed                       

    



             code = Comment CSF) on WBC count of less than                      

     



                          5. Cell counts performed                           



                          on CSF greater than two                           



                          hours after collection may                           



                          not be representative due                           



                          to cellular degradation.                           



[facility]2022-04-12 11:59:00





             Test Item    Value        Reference Range Interpretation Comments

 

             Glucose CSF (test code = Glucose CSF) 129          45-80           

          



[facility]2022-04-12 11:59:00





             Test Item    Value        Reference Range Interpretation Comments

 

             Protein CSF (test code = Protein CSF) 110          15-45           

          



[facility] Center[facility] [id number] 11:59:00





             Test Item    Value        Reference Range Interpretation Comments

 

             Gram Stain Report Gram Stain Performed By:                         

  



             (test code = Gram [facility]                           



             Stain Report) [facility] w/Gram [id number] 11:59:00





             Test Item    Value        Reference Range Interpretation Comments

 

             Culture: CSF w/Gram 48 Hour Report - No                           



             Stain (test code = Growth, Holding                           



             Culture: CSF w/Gram                                        



             Stain)                                              



[facility]2022-04-12 11:59:00





             Test Item    Value        Reference Range Interpretation Comments

 

             Tube Num CSF (test code = Tube Num CSF) 3 1                        

            



[facility] [id number] 11:59:00





             Test Item    Value        Reference Range Interpretation Comments

 

             Color CSF (test code Colorless (22 6:59                       

    



             = Color CSF) AM)                                    



[facility]2022-04-12 11:59:00





             Test Item    Value        Reference Range Interpretation Comments

 

             Clarity CSF (test code = Clear (22 6:59                       

    



             Clarity CSF) AM)                                    



[facility]2022-04-12 11:59:00





             Test Item    Value        Reference Range Interpretation Comments

 

             Supernat CSF (test Colorless (22 6:59                         

  



             code = Supernat CSF) AM)                                    



[facility] [id number] 11:59:00





             Test Item    Value        Reference Range Interpretation Comments

 

             Nucleated Cells CSF 3            See_Comment                [Automa

huma message] The



             (test code = Nucleated                                        syste

m which generated



             Cells CSF)                                          this result tra

nsmitted



                                                                 reference range

: <=53.



                                                                 The reference r

zhen was



                                                                 not used to int

erpret



                                                                 this result as



                                                                 normal/abnormal

.



[facility]2022-04-12 11:59:00





             Test Item    Value        Reference Range Interpretation Comments

 

             RBC CSF (test code = 64           See_Comment                [Autom

ated message] The



             RBC CSF)                                            system which ge

nerated this



                                                                 result transmit

huma



                                                                 reference range

: <=03. The



                                                                 reference range

 was not



                                                                 used to interpr

et this



                                                                 result as zayda

l/abnormal.



[facility]2022-04-12 11:59:00





             Test Item    Value        Reference Range Interpretation Comments

 

             Comment CSF (test Differential not performed                       

    



             code = Comment CSF) on WBC count of less than                      

     



                          5. Cell counts performed                           



                          on CSF greater than two                           



                          hours after collection may                           



                          not be representative due                           



                          to cellular degradation.                           



[facility] [id number] 11:59:00





             Test Item    Value        Reference Range Interpretation Comments

 

             Glucose CSF (test code = Glucose CSF) 129          45-80           

          



[facility] [id number] 11:59:00





             Test Item    Value        Reference Range Interpretation Comments

 

             Protein CSF (test code = Protein CSF) 110          15-45           

          



[id number]





             Test Item    Value        Reference Range Interpretation Comments

 

             Gram Stain Report Gram Stain Performed By:                         

  



             (test code = Gram [facility]                           



             Stain Report) [facility]ram [id number] 11:59:00





             Test Item    Value        Reference Range Interpretation Comments

 

             Culture: CSF w/Gram 48 Hour Report - No                           



             Stain (test code = Growth, Holding                           



             Culture: CSF w/Gram                                        



             Stain)                                              



[facility]2022-04-12 11:59:00





             Test Item    Value        Reference Range Interpretation Comments

 

             Tube Num CSF (test code = Tube Num CSF) 3 1                        

            



[facility]2022-04-12 11:59:00





             Test Item    Value        Reference Range Interpretation Comments

 

             Color CSF (test code Colorless (22 6:59                       

    



             = Color CSF) AM)                                    



[facility]2022-04-12 11:59:00





             Test Item    Value        Reference Range Interpretation Comments

 

             Clarity CSF (test code = Clear (22 6:59                       

    



             Clarity CSF) AM)                                    



[facility]2022-04-12 11:59:00





             Test Item    Value        Reference Range Interpretation Comments

 

             Supernat CSF (test Colorless (22 6:59                         

  



             code = Supernat CSF) AM)                                    



[facility]2022-04-12 11:59:00





             Test Item    Value        Reference Range Interpretation Comments

 

             Nucleated Cells CSF 3            See_Comment                [Automa

huma message] The



             (test code = Nucleated                                        syste

m which generated



             Cells CSF)                                          this result tra

nsmitted



                                                                 reference range

: <=53.



                                                                 The reference r

zhen was



                                                                 not used to int

erpret



                                                                 this result as



                                                                 normal/abnormal

.



[facility]2022-04-12 11:59:00





             Test Item    Value        Reference Range Interpretation Comments

 

             RBC CSF (test code = 64           See_Comment                [Autom

ated message] The



             RBC CSF)                                            system which ge

nerated this



                                                                 result transmit

huma



                                                                 reference range

: <=03. The



                                                                 reference range

 was not



                                                                 used to interpr

et this



                                                                 result as zayda

l/abnormal.



[facility]2022-04-12 11:59:00





             Test Item    Value        Reference Range Interpretation Comments

 

             Comment CSF (test Differential not performed                       

    



             code = Comment CSF) on WBC count of less than                      

     



                          5. Cell counts performed                           



                          on CSF greater than two                           



                          hours after collection may                           



                          not be representative due                           



                          to cellular degradation.                           



[facility] [id number] 11:59:00





             Test Item    Value        Reference Range Interpretation Comments

 

             Glucose CSF (test code = Glucose CSF) 129          45-80           

          



[facility] [id number] 11:59:00





             Test Item    Value        Reference Range Interpretation Comments

 

             Protein CSF (test code = Protein CSF) 110          15-45           

          



[facility] [id number] 11:59:00





             Test Item    Value        Reference Range Interpretation Comments

 

             Gram Stain Report Gram Stain Performed By:                         

  



             (test code = Gram [facility]                           



             Stain Report) [facility] w/Gram [id number] 11:59:00





             Test Item    Value        Reference Range Interpretation Comments

 

             Culture: CSF w/Gram 48 Hour Report - No                           



             Stain (test code = Growth, Holding                           



             Culture: CSF w/Gram                                        



             Stain)                                              



[facility]2022-04-12 11:59:00





             Test Item    Value        Reference Range Interpretation Comments

 

             Tube Num CSF (test code = Tube Num CSF) 3 1                        

            



[facility]2022-04-12 11:59:00





             Test Item    Value        Reference Range Interpretation Comments

 

             Color CSF (test code Colorless (22 6:59                       

    



             = Color CSF) AM)                                    



[facility]2022-04-12 11:59:00





             Test Item    Value        Reference Range Interpretation Comments

 

             Clarity CSF (test code = Clear (22 6:59                       

    



             Clarity CSF) AM)                                    



[facility]2022-04-12 11:59:00





             Test Item    Value        Reference Range Interpretation Comments

 

             Supernat CSF (test Colorless (22 6:59                         

  



             code = Supernat CSF) AM)                                    



[facility]2022-04-12 11:59:00





             Test Item    Value        Reference Range Interpretation Comments

 

             Nucleated Cells CSF 3            See_Comment                [Automa

huma message] The



             (test code = Nucleated                                        syste

m which generated



             Cells CSF)                                          this result tra

nsmitted



                                                                 reference range

: <=53.



                                                                 The reference r

zhen was



                                                                 not used to int

erpret



                                                                 this result as



                                                                 normal/abnormal

.



[facility]2022-04-12 11:59:00





             Test Item    Value        Reference Range Interpretation Comments

 

             RBC CSF (test code = 64           See_Comment                [Autom

ated message] The



             RBC CSF)                                            system which ge

nerated this



                                                                 result transmit

huma



                                                                 reference range

: <=03. The



                                                                 reference range

 was not



                                                                 used to interpr

et this



                                                                 result as zayda

l/abnormal.



[facility]2022-04-12 11:59:00





             Test Item    Value        Reference Range Interpretation Comments

 

             Comment CSF (test Differential not performed                       

    



             code = Comment CSF) on WBC count of less than                      

     



                          5. Cell counts performed                           



                          on CSF greater than two                           



                          hours after collection may                           



                          not be representative due                           



                          to cellular degradation.                           



[facility]2022-04-12 11:59:00





             Test Item    Value        Reference Range Interpretation Comments

 

             Glucose CSF (test code = Glucose CSF) 129          45-80           

          



[facility] [id number] 11:59:00





             Test Item    Value        Reference Range Interpretation Comments

 

             Protein CSF (test code = Protein CSF) 110          15-45           

          



[facility] [id number] 11:59:00





             Test Item    Value        Reference Range Interpretation Comments

 

             Gram Stain Report Gram Stain Performed By:                         

  



             (test code = Gram [facility]                           



             Stain Report) [facility]ram [id number] 11:59:00





             Test Item    Value        Reference Range Interpretation Comments

 

             Culture: CSF w/Gram 48 Hour Report - No                           



             Stain (test code = Growth, Holding                           



             Culture: CSF w/Gram                                        



             Stain)                                              



[facility] [id number] 14:46:00





             Test Item    Value        Reference Range Interpretation Comments

 

             Hgb A1C (test code = Hgb A1C) 5.6                                  

  



[facility] [id number] 14:46:00





             Test Item    Value        Reference Range Interpretation Comments

 

             Hgb A1C (test code = Hgb A1C) 5.6                                  

  



[facility] [id number] 14:46:00





             Test Item    Value        Reference Range Interpretation Comments

 

             Hgb A1C (test code = Hgb A1C) 5.6                                  

  



[facility] [id number] 14:46:00





             Test Item    Value        Reference Range Interpretation Comments

 

             Hgb A1C (test code = Hgb A1C) 5.6                                  

  



[facility] [id number] 11:43:00





             Test Item    Value        Reference Range Interpretation Comments

 

             Glucose Lvl (test code = Glucose Lvl) 418          70-99           

          



[facility] [id number] 11:43:00





             Test Item    Value        Reference Range Interpretation Comments

 

             BUN (test code = BUN) 22           7-22                      



[facility] [id number] 11:43:00





             Test Item    Value        Reference Range Interpretation Comments

 

             Creatinine Lvl (test code = Creatinine 1.10         0.50-1.40      

           



             Lvl)                                                



[facility] [id number] 11:43:00





             Test Item    Value        Reference Range Interpretation Comments

 

             Sodium Lvl (test code = Sodium Lvl) 138          135-145           

        



[facility] [id number] 11:43:00





             Test Item    Value        Reference Range Interpretation Comments

 

             Potassium Lvl (test code = Potassium 4.9          3.5-5.1          

         



             Lvl)                                                



[facility]2022-04-11 11:43:00





             Test Item    Value        Reference Range Interpretation Comments

 

             Chloride Lvl (test code = Chloride Lvl) 113                  

            



[facility]2022-04-11 11:43:00





             Test Item    Value        Reference Range Interpretation Comments

 

             CO2 (test code = CO2) 16           24-32                     



[place] [id number] 11:43:00





             Test Item    Value        Reference Range Interpretation Comments

 

             AGAP (test code = AGAP) 13.9         10.0-20.0                 



[place] [id number] 11:43:00





             Test Item    Value        Reference Range Interpretation Comments

 

             Calcium Lvl (test code = Calcium Lvl) 9.0          8.5-10.5        

          



[place] 805122-04-11 11:43:00





             Test Item    Value        Reference Range Interpretation Comments

 

             eGFR (test code = eGFR) 86                                     



[facility] [id number] 11:43:00





             Test Item    Value        Reference Range Interpretation Comments

 

             WBC (test code = WBC) 10.2         3.7-10.4                  



[place] 726962-04-11 11:43:00





             Test Item    Value        Reference Range Interpretation Comments

 

             RBC (test code = RBC) 5.46         4.70-6.10                 



[facility] [id number] 11:43:00





             Test Item    Value        Reference Range Interpretation Comments

 

             Hgb (test code = Hgb) 16.3         14.0-18.0                 



[place] 726962-04-11 11:43:00





             Test Item    Value        Reference Range Interpretation Comments

 

             Hct (test code = Hct) 50.0         42.0-54.0                 



[place] 726962-04-11 11:43:00





             Test Item    Value        Reference Range Interpretation Comments

 

             MCV (test code = MCV) 91.4         80.0-94.0                 



[place] 726962-04-11 11:43:00





             Test Item    Value        Reference Range Interpretation Comments

 

             MCH (test code = MCH) 29.9 pg      27.0-31.0                 



[place] 726962-04-11 11:43:00





             Test Item    Value        Reference Range Interpretation Comments

 

             MCHC (test code = MCHC) 32.7         32.0-36.0                 



[facility] C[id number] 11:43:00





             Test Item    Value        Reference Range Interpretation Comments

 

             RDW (test code = RDW) 15.7         11.5-14.5                 



[place] 726962-04-11 11:43:00





             Test Item    Value        Reference Range Interpretation Comments

 

             Platelet (test code = Platelet) 321          133-450               

    



[place] 726962-04-11 11:43:00





             Test Item    Value        Reference Range Interpretation Comments

 

             MPV (test code = MPV) 8.6          7.4-10.4                  



[facility] C[id number] 11:43:00





             Test Item    Value        Reference Range Interpretation Comments

 

             Segs (test code = Segs) 92.0         45.0-75.0                 



[place] 726962-04-11 11:43:00





             Test Item    Value        Reference Range Interpretation Comments

 

             Lymphocytes (test code = Lymphocytes) 5.2          20.0-40.0       

          



[place] 726962-04-11 11:43:00





             Test Item    Value        Reference Range Interpretation Comments

 

             Monocytes (test code = Monocytes) 1.6          2.0-12.0            

      



[place] 726962-04-11 11:43:00





             Test Item    Value        Reference Range Interpretation Comments

 

             Basophils (test code = 1.2          See_Comment                [Aut

omated message] The



             Basophils)                                          system which ge

nerated



                                                                 this result tra

nsmitted



                                                                 reference range

: <=1.0.



                                                                 The reference r

zhen was



                                                                 not used to int

erpret



                                                                 this result as



                                                                 normal/abnormal

.



[facility] C[id number] 11:43:00





             Test Item    Value        Reference Range Interpretation Comments

 

             Neutrophils # (test code = Neutrophils 9.4          1.5-8.1        

           



             #)                                                  



[facility] [id number] 11:43:00





             Test Item    Value        Reference Range Interpretation Comments

 

             Lymphocytes # (test code = Lymphocytes 0.5          1.0-5.5        

           



             #)                                                  



[place] 726962-04-11 11:43:00





             Test Item    Value        Reference Range Interpretation Comments

 

             Monocytes # (test code 0.2          See_Comment                [Aut

omated message] The



             = Monocytes #)                                        system which 

generated



                                                                 this result tra

nsmitted



                                                                 reference range

: <=0.8.



                                                                 The reference r

zhen was



                                                                 not used to int

erpret



                                                                 this result as



                                                                 normal/abnormal

.



[place] 726962-04-11 11:43:00





             Test Item    Value        Reference Range Interpretation Comments

 

             Basophils # (test code 0.1          See_Comment                [Aut

omated message] The



             = Basophils #)                                        system which 

generated



                                                                 this result tra

nsmitted



                                                                 reference range

: <=0.2.



                                                                 The reference r

zhen was



                                                                 not used to int

erpret



                                                                 this result as



                                                                 normal/abnormal

.



[facility]2022-04-11 11:43:00





             Test Item    Value        Reference Range Interpretation Comments

 

             Glucose Lvl (test code = Glucose Lvl) 418          70-99           

          



[facility]2022-04-11 11:43:00





             Test Item    Value        Reference Range Interpretation Comments

 

             BUN (test code = BUN) 22           7-22                      



[place] 805122-04-11 11:43:00





             Test Item    Value        Reference Range Interpretation Comments

 

             Creatinine Lvl (test code = Creatinine 1.10         0.50-1.40      

           



             Lvl)                                                



[facility]2022-04-11 11:43:00





             Test Item    Value        Reference Range Interpretation Comments

 

             Sodium Lvl (test code = Sodium Lvl) 138          135-145           

        



[place] 805122-04-11 11:43:00





             Test Item    Value        Reference Range Interpretation Comments

 

             Potassium Lvl (test code = Potassium 4.9          3.5-5.1          

         



             Lvl)                                                



[facility]2022-04-11 11:43:00





             Test Item    Value        Reference Range Interpretation Comments

 

             Chloride Lvl (test code = Chloride Lvl) 113                  

            



[facility]2022-04-11 11:43:00





             Test Item    Value        Reference Range Interpretation Comments

 

             CO2 (test code = CO2) 16           24-32                     



[facility]2022-04-11 11:43:00





             Test Item    Value        Reference Range Interpretation Comments

 

             AGAP (test code = AGAP) 13.9         10.0-20.0                 



[facility]2022-04-11 11:43:00





             Test Item    Value        Reference Range Interpretation Comments

 

             Calcium Lvl (test code = Calcium Lvl) 9.0          8.5-10.5        

          



[facility]2022-04-11 11:43:00





             Test Item    Value        Reference Range Interpretation Comments

 

             eGFR (test code = eGFR) 86                                     



[facility] CenterHvslzbfQIUGAUQSYZ6779-07-90 11:43:00





             Test Item    Value        Reference Range Interpretation Comments

 

             WBC (test code = WBC) 10.2         3.7-10.4                  



[place]26962-04-11 11:43:00





             Test Item    Value        Reference Range Interpretation Comments

 

             RBC (test code = RBC) 5.46         4.70-6.10                 



[facility] CenterQawdiceHPLLQDCYNS0436-10-83 11:43:00





             Test Item    Value        Reference Range Interpretation Comments

 

             Hgb (test code = Hgb) 16.3         14.0-18.0                 



[facility] CenterZrwijjwOEADVCJYAB6077-73-06 11:43:00





             Test Item    Value        Reference Range Interpretation Comments

 

             Hct (test code = Hct) 50.0         42.0-54.0                 



[facility] CenterNcvuetbIOTCSOAVJB1661-82-51 11:43:00





             Test Item    Value        Reference Range Interpretation Comments

 

             MCV (test code = MCV) 91.4         80.0-94.0                 



[facility] CenterIyizydmRWZHTISJPG0055-97-45 11:43:00





             Test Item    Value        Reference Range Interpretation Comments

 

             MCH (test code = MCH) 29.9 pg      27.0-31.0                 



[facility] CenterLmgcpilVKXMTANCFC8480-10-50 11:43:00





             Test Item    Value        Reference Range Interpretation Comments

 

             MCHC (test code = MCHC) 32.7         32.0-36.0                 



[facility] CenterZnjpeocVGUGXKVXYG3133-16-11 11:43:00





             Test Item    Value        Reference Range Interpretation Comments

 

             RDW (test code = RDW) 15.7         11.5-14.5                 



[facility] CenterZhamkstKQOTSUSIKA9066-34-00 11:43:00





             Test Item    Value        Reference Range Interpretation Comments

 

             Platelet (test code = Platelet) 321          133-450               

    



[facility] CenterVffzduySRPFEKCSSH1170-74-43 11:43:00





             Test Item    Value        Reference Range Interpretation Comments

 

             MPV (test code = MPV) 8.6          7.4-10.4                  



[facility] CenterQkhprssKKDYUTMMSV8516-71-42 11:43:00





             Test Item    Value        Reference Range Interpretation Comments

 

             Segs (test code = Segs) 92.0         45.0-75.0                 



[facility] CenterAcaoyxfNRAGDNSCEZ3702-40-55 11:43:00





             Test Item    Value        Reference Range Interpretation Comments

 

             Lymphocytes (test code = Lymphocytes) 5.2          20.0-40.0       

          



[place] 726962-04-11 11:43:00





             Test Item    Value        Reference Range Interpretation Comments

 

             Monocytes (test code = Monocytes) 1.6          2.0-12.0            

      



[facility] CenterOtwprbuOLZMONPEKF6280-97-98 11:43:00





             Test Item    Value        Reference Range Interpretation Comments

 

             Basophils (test code = 1.2          See_Comment                [Aut

omated message] The



             Basophils)                                          system which ge

nerated



                                                                 this result tra

nsmitted



                                                                 reference range

: <=1.0.



                                                                 The reference r

zhen was



                                                                 not used to int

erpret



                                                                 this result as



                                                                 normal/abnormal

.



[place] 726962-04-11 11:43:00





             Test Item    Value        Reference Range Interpretation Comments

 

             Neutrophils # (test code = Neutrophils 9.4          1.5-8.1        

           



             #)                                                  



[place] 726962-04-11 11:43:00





             Test Item    Value        Reference Range Interpretation Comments

 

             Lymphocytes # (test code = Lymphocytes 0.5          1.0-5.5        

           



             #)                                                  



[place] 726962-04-11 11:43:00





             Test Item    Value        Reference Range Interpretation Comments

 

             Monocytes # (test code 0.2          See_Comment                [Aut

omated message] The



             = Monocytes #)                                        system which 

generated



                                                                 this result tra

nsmitted



                                                                 reference range

: <=0.8.



                                                                 The reference r

zhen was



                                                                 not used to int

erpret



                                                                 this result as



                                                                 normal/abnormal

.



[place] 726962-04-11 11:43:00





             Test Item    Value        Reference Range Interpretation Comments

 

             Basophils # (test code 0.1          See_Comment                [Aut

omated message] The



             = Basophils #)                                        system which 

generated



                                                                 this result tra

nsmitted



                                                                 reference range

: <=0.2.



                                                                 The reference r

zhen was



                                                                 not used to int

erpret



                                                                 this result as



                                                                 normal/abnormal

.



[facility]2022-04-11 11:43:00





             Test Item    Value        Reference Range Interpretation Comments

 

             Glucose Lvl (test code = Glucose Lvl) 418          70-99           

          



[place] 805122-04-11 11:43:00





             Test Item    Value        Reference Range Interpretation Comments

 

             BUN (test code = BUN) 22           7-22                      



[place] 805122-04-11 11:43:00





             Test Item    Value        Reference Range Interpretation Comments

 

             Creatinine Lvl (test code = Creatinine 1.10         0.50-1.40      

           



             Lvl)                                                



[place] 805122-04-11 11:43:00





             Test Item    Value        Reference Range Interpretation Comments

 

             Sodium Lvl (test code = Sodium Lvl) 138          135-145           

        



[place] 805122-04-11 11:43:00





             Test Item    Value        Reference Range Interpretation Comments

 

             Potassium Lvl (test code = Potassium 4.9          3.5-5.1          

         



             Lvl)                                                



[place] 805122-04-11 11:43:00





             Test Item    Value        Reference Range Interpretation Comments

 

             Chloride Lvl (test code = Chloride Lvl) 113                  

            



[facility]2022-04-11 11:43:00





             Test Item    Value        Reference Range Interpretation Comments

 

             CO2 (test code = CO2) 16           24-32                     



[facility]2022-04-11 11:43:00





             Test Item    Value        Reference Range Interpretation Comments

 

             AGAP (test code = AGAP) 13.9         10.0-20.0                 



[facility]2022-04-11 11:43:00





             Test Item    Value        Reference Range Interpretation Comments

 

             Calcium Lvl (test code = Calcium Lvl) 9.0          8.5-10.5        

          



[facility]2022-04-11 11:43:00





             Test Item    Value        Reference Range Interpretation Comments

 

             eGFR (test code = eGFR) 86                                     



[facility] CenterVcndtqsLVSIIOUWIG7842-01-25 11:43:00





             Test Item    Value        Reference Range Interpretation Comments

 

             WBC (test code = WBC) 10.2         3.7-10.4                  



[facility] CenterAkqagodEBVAUDZLQN0144-14-22 11:43:00





             Test Item    Value        Reference Range Interpretation Comments

 

             RBC (test code = RBC) 5.46         4.70-6.10                 



[facility] CenterOpmlmllTBYBOFCJYT4698-15-76 11:43:00





             Test Item    Value        Reference Range Interpretation Comments

 

             Hgb (test code = Hgb) 16.3         14.0-18.0                 



[facility] CenterOguhdpyPCLBLHQDIU2574-94-08 11:43:00





             Test Item    Value        Reference Range Interpretation Comments

 

             Hct (test code = Hct) 50.0         42.0-54.0                 



[facility] CenterCyjgsqqYGYGKGBLMM7020-06-02 11:43:00





             Test Item    Value        Reference Range Interpretation Comments

 

             MCV (test code = MCV) 91.4         80.0-94.0                 



[place] 726962-04-11 11:43:00





             Test Item    Value        Reference Range Interpretation Comments

 

             MCH (test code = MCH) 29.9 pg      27.0-31.0                 



[facility] CenterQtfheyiPKWFECKQNF8179-37-22 11:43:00





             Test Item    Value        Reference Range Interpretation Comments

 

             MCHC (test code = MCHC) 32.7         32.0-36.0                 



[facility] CenterVbvdndtSSJPGAFEIH5596-69-89 11:43:00





             Test Item    Value        Reference Range Interpretation Comments

 

             RDW (test code = RDW) 15.7         11.5-14.5                 



[place] 726962-04-11 11:43:00





             Test Item    Value        Reference Range Interpretation Comments

 

             Platelet (test code = Platelet) 321          133-450               

    



[place] 726962-04-11 11:43:00





             Test Item    Value        Reference Range Interpretation Comments

 

             MPV (test code = MPV) 8.6          7.4-10.4                  



[place] 726962-04-11 11:43:00





             Test Item    Value        Reference Range Interpretation Comments

 

             Segs (test code = Segs) 92.0         45.0-75.0                 



[place] [address]-04-11 11:43:00





             Test Item    Value        Reference Range Interpretation Comments

 

             Lymphocytes (test code = Lymphocytes) 5.2          20.0-40.0       

          



[place] [address]-04-11 11:43:00





             Test Item    Value        Reference Range Interpretation Comments

 

             Monocytes (test code = Monocytes) 1.6          2.0-12.0            

      



[place] 726962-04-11 11:43:00





             Test Item    Value        Reference Range Interpretation Comments

 

             Basophils (test code = 1.2          See_Comment                [Aut

omated message] The



             Basophils)                                          system which ge

nerated



                                                                 this result tra

nsmitted



                                                                 reference range

: <=1.0.



                                                                 The reference r

zhen was



                                                                 not used to int

erpret



                                                                 this result as



                                                                 normal/abnormal

.



[place] 726962-04-11 11:43:00





             Test Item    Value        Reference Range Interpretation Comments

 

             Neutrophils # (test code = Neutrophils 9.4          1.5-8.1        

           



             #)                                                  



[place] 726962-04-11 11:43:00





             Test Item    Value        Reference Range Interpretation Comments

 

             Lymphocytes # (test code = Lymphocytes 0.5          1.0-5.5        

           



             #)                                                  



[place] [address]-04-11 11:43:00





             Test Item    Value        Reference Range Interpretation Comments

 

             Monocytes # (test code 0.2          See_Comment                [Aut

omated message] The



             = Monocytes #)                                        system which 

generated



                                                                 this result tra

nsmitted



                                                                 reference range

: <=0.8.



                                                                 The reference r

zhen was



                                                                 not used to int

erpret



                                                                 this result as



                                                                 normal/abnormal

.



[place]26962-04-11 11:43:00





             Test Item    Value        Reference Range Interpretation Comments

 

             Basophils # (test code 0.1          See_Comment                [Aut

omated message] The



             = Basophils #)                                        system which 

generated



                                                                 this result tra

nsmitted



                                                                 reference range

: <=0.2.



                                                                 The reference r

zhen was



                                                                 not used to int

erpret



                                                                 this result as



                                                                 normal/abnormal

.



[facility]2022-04-11 11:43:00





             Test Item    Value        Reference Range Interpretation Comments

 

             Glucose Lvl (test code = Glucose Lvl) 418          70-99           

          



[place] 805122-04-11 11:43:00





             Test Item    Value        Reference Range Interpretation Comments

 

             BUN (test code = BUN) 22           7-22                      



[facility]2022-04-11 11:43:00





             Test Item    Value        Reference Range Interpretation Comments

 

             Creatinine Lvl (test code = Creatinine 1.10         0.50-1.40      

           



             Lvl)                                                



[facility]2022-04-11 11:43:00





             Test Item    Value        Reference Range Interpretation Comments

 

             Sodium Lvl (test code = Sodium Lvl) 138          135-145           

        



[place] 805122-04-11 11:43:00





             Test Item    Value        Reference Range Interpretation Comments

 

             Potassium Lvl (test code = Potassium 4.9          3.5-5.1          

         



             Lvl)                                                



[facility]2022-04-11 11:43:00





             Test Item    Value        Reference Range Interpretation Comments

 

             Chloride Lvl (test code = Chloride Lvl) 113                  

            



[facility]2022-04-11 11:43:00





             Test Item    Value        Reference Range Interpretation Comments

 

             CO2 (test code = CO2) 16           24-32                     



[facility]2022-04-11 11:43:00





             Test Item    Value        Reference Range Interpretation Comments

 

             AGAP (test code = AGAP) 13.9         10.0-20.0                 



[facility]2022-04-11 11:43:00





             Test Item    Value        Reference Range Interpretation Comments

 

             Calcium Lvl (test code = Calcium Lvl) 9.0          8.5-10.5        

          



[facility]2022-04-11 11:43:00





             Test Item    Value        Reference Range Interpretation Comments

 

             eGFR (test code = eGFR) 86                                     



[facility] CenterMfzzhrjBZEWCHBBEO0701-05-05 11:43:00





             Test Item    Value        Reference Range Interpretation Comments

 

             WBC (test code = WBC) 10.2         3.7-10.4                  



[facility] CenterYsnwcgkWVLBDQSAXI5020-78-18 11:43:00





             Test Item    Value        Reference Range Interpretation Comments

 

             RBC (test code = RBC) 5.46         4.70-6.10                 



[place]26962-04-11 11:43:00





             Test Item    Value        Reference Range Interpretation Comments

 

             Hgb (test code = Hgb) 16.3         14.0-18.0                 



[facility] CenterGooxyaeOZWNJTIIOW5631-61-94 11:43:00





             Test Item    Value        Reference Range Interpretation Comments

 

             Hct (test code = Hct) 50.0         42.0-54.0                 



[facility] CenterDeouftqZVBEWWCQIU3809-75-94 11:43:00





             Test Item    Value        Reference Range Interpretation Comments

 

             MCV (test code = MCV) 91.4         80.0-94.0                 



[facility] CenterIabzvjyFXUBZDJEBX3642-63-62 11:43:00





             Test Item    Value        Reference Range Interpretation Comments

 

             MCH (test code = MCH) 29.9 pg      27.0-31.0                 



[facility] CenterNdznzwrAHPWPZWUJL8102-46-84 11:43:00





             Test Item    Value        Reference Range Interpretation Comments

 

             MCHC (test code = MCHC) 32.7         32.0-36.0                 



[facility] CenterQtevdckVBQKSQAVEW1426-52-24 11:43:00





             Test Item    Value        Reference Range Interpretation Comments

 

             RDW (test code = RDW) 15.7         11.5-14.5                 



[facility] CenterNrzwxmsOTSXUPOKBE6185-20-86 11:43:00





             Test Item    Value        Reference Range Interpretation Comments

 

             Platelet (test code = Platelet) 321          133-450               

    



[facility] CenterFgfhdxsLFZJLAQRFU9448-81-62 11:43:00





             Test Item    Value        Reference Range Interpretation Comments

 

             MPV (test code = MPV) 8.6          7.4-10.4                  



[facility] CenterRngcuytQNZLYQZBYM3996-84-60 11:43:00





             Test Item    Value        Reference Range Interpretation Comments

 

             Segs (test code = Segs) 92.0         45.0-75.0                 



[facility] CenterLivsqxoONHKUZRPAY6932-00-45 11:43:00





             Test Item    Value        Reference Range Interpretation Comments

 

             Lymphocytes (test code = Lymphocytes) 5.2          20.0-40.0       

          



[place]26962-04-11 11:43:00





             Test Item    Value        Reference Range Interpretation Comments

 

             Monocytes (test code = Monocytes) 1.6          2.0-12.0            

      



[place] 726962-04-11 11:43:00





             Test Item    Value        Reference Range Interpretation Comments

 

             Basophils (test code = 1.2          See_Comment                [Aut

omated message] The



             Basophils)                                          system which ge

nerated



                                                                 this result tra

nsmitted



                                                                 reference range

: <=1.0.



                                                                 The reference r

zhen was



                                                                 not used to int

erpret



                                                                 this result as



                                                                 normal/abnormal

.



[place] [address]-04-11 11:43:00





             Test Item    Value        Reference Range Interpretation Comments

 

             Neutrophils # (test code = Neutrophils 9.4          1.5-8.1        

           



             #)                                                  



[place] [address]-04-11 11:43:00





             Test Item    Value        Reference Range Interpretation Comments

 

             Lymphocytes # (test code = Lymphocytes 0.5          1.0-5.5        

           



             #)                                                  



[place] [address]26962-04-11 11:43:00





             Test Item    Value        Reference Range Interpretation Comments

 

             Monocytes # (test code 0.2          See_Comment                [Aut

omated message] The



             = Monocytes #)                                        system which 

generated



                                                                 this result tra

nsmitted



                                                                 reference range

: <=0.8.



                                                                 The reference r

zhen was



                                                                 not used to int

erpret



                                                                 this result as



                                                                 normal/abnormal

.



[place] [address]-04-11 11:43:00





             Test Item    Value        Reference Range Interpretation Comments

 

             Basophils # (test code 0.1          See_Comment                [Aut

omated message] The



             = Basophils #)                                        system which 

generated



                                                                 this result tra

nsmitted



                                                                 reference range

: <=0.2.



                                                                 The reference r

zhen was



                                                                 not used to int

erpret



                                                                 this result as



                                                                 normal/abnormal

.



[place] [address]-04-09 09:18:00





             Test Item    Value        Reference Range Interpretation Comments

 

             Coronavirus (COVID-19) Not Detected (22                        

   



             ZEYAD (test code = 4:18 AM)                               



             Coronavirus (COVID-19)                                        



             ZEYAD)                                                



[place] [address]-04-09 09:18:00





             Test Item    Value        Reference Range Interpretation Comments

 

             Coronavirus (COVID-19) Not Detected (22                        

   



             ZEYAD (test code = 4:18 AM)                               



             Coronavirus (COVID-19)                                        



             ZEYAD)                                                



[place] [address]-04-09 09:18:00





             Test Item    Value        Reference Range Interpretation Comments

 

             Coronavirus (COVID-19) Not Detected (22                        

   



             ZEYAD (test code = 4:18 AM)                               



             Coronavirus (COVID-19)                                        



             ZEYAD)                                                



[place] [address]-04-09 09:18:00





             Test Item    Value        Reference Range Interpretation Comments

 

             Coronavirus (COVID-19) Not Detected (22                        

   



             ZEYAD (test code = 4:18 AM)                               



             Coronavirus (COVID-19)                                        



             ZEYAD)                                                



[facility]ATE:ORDQN:[id number] 08:19:00





             Test Item    Value        Reference Range Interpretation Comments

 

             Culture: Urine (test >100,000 CFU/mL                           



             code = Culture: Providencia stuartii                           



             Urine)                                              



[facility]US[facility]:PT:ISOLATE:ORDQN:[id number] 08:19:00





             Test Item    Value        Reference Range Interpretation Comments

 

             Providencia stuartii Providencia stuartii                          

 



             (test code = Providencia                                        



             stuartii)                                           



[facility] AND [id number] 08:19:00





             Test Item    Value        Reference Range Interpretation Comments

 

             UA Color (test code = Yellow *NA*(22 3:19                      

     



             UA Color)    AM)                                    



[facility] AND [id number] 08:19:00





             Test Item    Value        Reference Range Interpretation Comments

 

             UA Turbidity (test code Marked *ABN*(22                        

   



             = UA Turbidity) 3:19 AM)                               



[facility] AND [id number] 08:19:00





             Test Item    Value        Reference Range Interpretation Comments

 

             UA Spec Grav (test code = UA Spec 1.013 1                          

      



             Grav)                                               



[facility] AND [id number] 08:19:00





             Test Item    Value        Reference Range Interpretation Comments

 

             UA pH (test code = UA pH) 5.0 1        5.0-8.0                   



[facility] AND [id number] 08:19:00





             Test Item    Value        Reference Range Interpretation Comments

 

             UA Protein (test code = UA Negative mg/dL                          

 



             Protein)                                            



[facility] AND [id number] 08:19:00





             Test Item    Value        Reference Range Interpretation Comments

 

             UA Glucose (test code = UA Negative mg/dL                          

 



             Glucose)                                            



[facility] AND [id number] 08:19:00





             Test Item    Value        Reference Range Interpretation Comments

 

             UA Ketones (test code = UA Negative mg/dL                          

 



             Ketones)                                            



[facility] AND [id number] 08:19:00





             Test Item    Value        Reference Range Interpretation Comments

 

             UA Bili (test code = Negative *NA*(22                          

 



             UA Bili)     3:19 AM)                               



[facility] AND [id number] 08:19:00





             Test Item    Value        Reference Range Interpretation Comments

 

             UA Blood (test code = Small *ABN*(22                           



             UA Blood)    3:19 AM)                               



[facility] [id number] 08:19:00





             Test Item    Value        Reference Range Interpretation Comments

 

             UA Urobilinogen (test code = UA no gt        0.1-1.0               

    



             Urobilinogen)                                        



[facility] [id number] 08:19:00





             Test Item    Value        Reference Range Interpretation Comments

 

             UA Nitrite (test code Positive *ABN*(22                        

   



             = UA Nitrite) 3:19 AM)                               



[facility] [id number] 08:19:00





             Test Item    Value        Reference Range Interpretation Comments

 

             UA Leuk Est (test code Large *ABN*(22 3:19                     

      



             = UA Leuk Est) AM)                                    



[facility] [id number] 08:19:00





             Test Item    Value        Reference Range Interpretation Comments

 

             UA WBC (test code = no gt        See_Comment                [Automa

huma message] The



             UA WBC)                                             system which ge

nerated this



                                                                 result transmit

huma



                                                                 reference range

: <=5. The



                                                                 reference range

 was not



                                                                 used to interpr

et this



                                                                 result as zayda

l/abnormal.



[facility] [id number] 08:19:00





             Test Item    Value        Reference Range Interpretation Comments

 

             UA RBC (test code = 8            See_Comment                [Automa

huma message] The



             UA RBC)                                             system which ge

nerated this



                                                                 result transmit

huma



                                                                 reference range

: <=2. The



                                                                 reference range

 was not



                                                                 used to interpr

et this



                                                                 result as zayda

l/abnormal.



[facility] [id number] 08:19:00





             Test Item    Value        Reference Range Interpretation Comments

 

             UA Bacteria (test code = UA Occasional /HPF                        

   



             Bacteria)                                           



[facility] [id number] 08:19:00





             Test Item    Value        Reference Range Interpretation Comments

 

             UA Mucus (test code = UA Mucus) Few /LPF                           

    



[facility] [id number] 08:19:00





             Test Item    Value        Reference Range Interpretation Comments

 

             UA Amorph Ivonne (test code = Occasional /HPF                        

   



             UA Amorph Ivonne)                                        



[facility] [id number] 08:19:00





             Test Item    Value        Reference Range Interpretation Comments

 

             UA Sq Epi (test code = UA Sq Epi) None Seen                        

      



[facility] [id number] [facility]





             Test Item    Value        Reference Range Interpretation Comments

 

             Culture: Urine (test Holding For Better                           



             code = Culture: Urine) Growth                                 



[facility] KristinAXONE:SUSC:PT:ISOLATE:ORDQN:[id number] 08:19:00





             Test Item    Value        Reference Range Interpretation Comments

 

             Culture: Urine (test >100,000 CFU/mL                           



             code = Culture: Providencia stuartii                           



             Urine)                                              



[facility] FosterAXONE:SUSC:PT:ISOLATE:ORDQN:[id number] 08:19:00





             Test Item    Value        Reference Range Interpretation Comments

 

             Providencia stuartii Providencia stuartii                          

 



             (test code = Providencia                                        



             stuartii)                                           



[facility] [id number] 08:19:00





             Test Item    Value        Reference Range Interpretation Comments

 

             UA Color (test code = Yellow *NA*(22 3:19                      

     



             UA Color)    AM)                                    



[facility] [id number] 08:19:00





             Test Item    Value        Reference Range Interpretation Comments

 

             UA Turbidity (test code Marked *ABN*(22                        

   



             = UA Turbidity) 3:19 AM)                               



[facility] [id number] 08:19:00





             Test Item    Value        Reference Range Interpretation Comments

 

             UA Spec Grav (test code = UA Spec 1.013 1                          

      



             Grav)                                               



[facility] [id number] 08:19:00





             Test Item    Value        Reference Range Interpretation Comments

 

             UA pH (test code = UA pH) 5.0 1        5.0-8.0                   



[facility] [id number] 08:19:00





             Test Item    Value        Reference Range Interpretation Comments

 

             UA Protein (test code = UA Negative mg/dL                          

 



             Protein)                                            



[facility] [id number] 08:19:00





             Test Item    Value        Reference Range Interpretation Comments

 

             UA Glucose (test code = UA Negative mg/dL                          

 



             Glucose)                                            



[facility] [id number] 08:19:00





             Test Item    Value        Reference Range Interpretation Comments

 

             UA Ketones (test code = UA Negative mg/dL                          

 



             Ketones)                                            



[facility] [id number] 08:19:00





             Test Item    Value        Reference Range Interpretation Comments

 

             UA Bili (test code = Negative *NA*(22                          

 



             UA Bili)     3:19 AM)                               



[facility] [id number] 08:19:00





             Test Item    Value        Reference Range Interpretation Comments

 

             UA Blood (test code = Small *ABN*(22                           



             UA Blood)    3:19 AM)                               



[facility] [id number] 08:19:00





             Test Item    Value        Reference Range Interpretation Comments

 

             UA Urobilinogen (test code = UA no gt        0.1-1.0               

    



             Urobilinogen)                                        



[facility] [id number] 08:19:00





             Test Item    Value        Reference Range Interpretation Comments

 

             UA Nitrite (test code Positive *ABN*(22                        

   



             = UA Nitrite) 3:19 AM)                               



[facility] [id number] 08:19:00





             Test Item    Value        Reference Range Interpretation Comments

 

             UA Leuk Est (test code Large *ABN*(22 3:19                     

      



             = UA Leuk Est) AM)                                    



[facility] [id number] 08:19:00





             Test Item    Value        Reference Range Interpretation Comments

 

             UA WBC (test code = no gt        See_Comment                [Automa

huma message] The



             UA WBC)                                             system which ge

nerated this



                                                                 result transmit

huma



                                                                 reference range

: <=5. The



                                                                 reference range

 was not



                                                                 used to interpr

et this



                                                                 result as zayda

l/abnormal.



[facility] [id number] 08:19:00





             Test Item    Value        Reference Range Interpretation Comments

 

             UA RBC (test code = 8            See_Comment                [Automa

huma message] The



             UA RBC)                                             system which ge

nerated this



                                                                 result transmit

huma



                                                                 reference range

: <=2. The



                                                                 reference range

 was not



                                                                 used to interpr

et this



                                                                 result as zayda

l/abnormal.



[facility] [id number] 08:19:00





             Test Item    Value        Reference Range Interpretation Comments

 

             UA Bacteria (test code = UA Occasional /HPF                        

   



             Bacteria)                                           



[facility] [id number] 08:19:00





             Test Item    Value        Reference Range Interpretation Comments

 

             UA Mucus (test code = UA Mucus) Few /LPF                           

    



[facility] [id number] 08:19:00





             Test Item    Value        Reference Range Interpretation Comments

 

             UA Amorph Ivonne (test code = Occasional /HPF                        

   



             UA Amorph Ivonne)                                        



[facility] [id number] 08:19:00





             Test Item    Value        Reference Range Interpretation Comments

 

             UA Sq Epi (test code = UA Sq Epi) None Seen                        

      



[facility] [id number] [facility]





             Test Item    Value        Reference Range Interpretation Comments

 

             Culture: Urine (test Holding For Better                           



             code = Culture: Urine) Growth                                 



[facility] Jules:SUSC:PT:ISOLATE:ORDQN:[id number] 08:19:00





             Test Item    Value        Reference Range Interpretation Comments

 

             Culture: Urine (test >100,000 CFU/mL                           



             code = Culture: Providencia stuartii                           



             Urine)                                              



[facility] FosterAXONE:SUSC:PT:ISOLATE:ORDQN:[id number] 08:19:00





             Test Item    Value        Reference Range Interpretation Comments

 

             Providencia stuartii Providencia stuartii                          

 



             (test code = Providencia                                        



             stuartii)                                           



[facility] [id number] 08:19:00





             Test Item    Value        Reference Range Interpretation Comments

 

             UA Color (test code = Yellow *NA*(22 3:19                      

     



             UA Color)    AM)                                    



[facility] [id number] 08:19:00





             Test Item    Value        Reference Range Interpretation Comments

 

             UA Turbidity (test code Marked *ABN*(22                        

   



             = UA Turbidity) 3:19 AM)                               



[facility] [id number] 08:19:00





             Test Item    Value        Reference Range Interpretation Comments

 

             UA Spec Grav (test code = UA Spec 1.013 1                          

      



             Grav)                                               



[facility] [id number] 08:19:00





             Test Item    Value        Reference Range Interpretation Comments

 

             UA pH (test code = UA pH) 5.0 1        5.0-8.0                   



[facility] [id number] 08:19:00





             Test Item    Value        Reference Range Interpretation Comments

 

             UA Protein (test code = UA Negative mg/dL                          

 



             Protein)                                            



[facility] [id number] 08:19:00





             Test Item    Value        Reference Range Interpretation Comments

 

             UA Glucose (test code = UA Negative mg/dL                          

 



             Glucose)                                            



[facility] [id number] 08:19:00





             Test Item    Value        Reference Range Interpretation Comments

 

             UA Ketones (test code = UA Negative mg/dL                          

 



             Ketones)                                            



[facility] [id number] 08:19:00





             Test Item    Value        Reference Range Interpretation Comments

 

             UA Bili (test code = Negative *NA*(22                          

 



             UA Bili)     3:19 AM)                               



[facility] [id number] 08:19:00





             Test Item    Value        Reference Range Interpretation Comments

 

             UA Blood (test code = Small *ABN*(22                           



             UA Blood)    3:19 AM)                               



[facility] [id number] 08:19:00





             Test Item    Value        Reference Range Interpretation Comments

 

             UA Urobilinogen (test code = UA no gt        0.1-1.0               

    



             Urobilinogen)                                        



[facility] [id number] 08:19:00





             Test Item    Value        Reference Range Interpretation Comments

 

             UA Nitrite (test code Positive *ABN*(22                        

   



             = UA Nitrite) 3:19 AM)                               



[facility] [id number] 08:19:00





             Test Item    Value        Reference Range Interpretation Comments

 

             UA Leuk Est (test code Large *ABN*(22 3:19                     

      



             = UA Leuk Est) AM)                                    



[facility] AND [id number] 08:19:00





             Test Item    Value        Reference Range Interpretation Comments

 

             UA WBC (test code = no gt        See_Comment                [Automa

huma message] The



             UA WBC)                                             system which ge

nerated this



                                                                 result transmit

huma



                                                                 reference range

: <=5. The



                                                                 reference range

 was not



                                                                 used to interpr

et this



                                                                 result as zayda

l/abnormal.



[facility] AND [id number] 08:19:00





             Test Item    Value        Reference Range Interpretation Comments

 

             UA RBC (test code = 8            See_Comment                [Automa

huma message] The



             UA RBC)                                             system which ge

nerated this



                                                                 result transmit

huma



                                                                 reference range

: <=2. The



                                                                 reference range

 was not



                                                                 used to interpr

et this



                                                                 result as zayda

l/abnormal.



[facility] [id number] 08:19:00





             Test Item    Value        Reference Range Interpretation Comments

 

             UA Bacteria (test code = UA Occasional /HPF                        

   



             Bacteria)                                           



[facility] [id number] 08:19:00





             Test Item    Value        Reference Range Interpretation Comments

 

             UA Mucus (test code = UA Mucus) Few /LPF                           

    



[facility] [id number] 08:19:00





             Test Item    Value        Reference Range Interpretation Comments

 

             UA Amorph Ivonne (test code = Occasional /HPF                        

   



             UA Amorph Ivonne)                                        



[facility] [id number] 08:19:00





             Test Item    Value        Reference Range Interpretation Comments

 

             UA Sq Epi (test code = UA Sq Epi) None Seen                        

      



[facility] [id number] 08:19:00





             Test Item    Value        Reference Range Interpretation Comments

 

             Culture: Urine (test Holding For Better                           



             code = Culture: Urine) Growth                                 



[facility]:SUSC:PT:ISOLATE:ORDQN:[id number] 08:19:00





             Test Item    Value        Reference Range Interpretation Comments

 

             Culture: Urine (test >100,000 CFU/mL                           



             code = Culture: Providencia stuartii                           



             Urine)                                              



Tamia FosterAXONE:SUSC:PT:ISOLATE:ORDQN:[id number] 08:19:00





             Test Item    Value        Reference Range Interpretation Comments

 

             Providencia stuartii Providencia stuartii                          

 



             (test code = Providencia                                        



             stuartii)                                           



[facility] AND [id number] 08:19:00





             Test Item    Value        Reference Range Interpretation Comments

 

             UA Color (test code = Yellow *NA*(22 3:19                      

     



             UA Color)    AM)                                    



[facility] AND [id number] 08:19:00





             Test Item    Value        Reference Range Interpretation Comments

 

             UA Turbidity (test code Marked *ABN*(22                        

   



             = UA Turbidity) 3:19 AM)                               



[facility] AND [id number] 08:19:00





             Test Item    Value        Reference Range Interpretation Comments

 

             UA Spec Grav (test code = UA Spec 1.013 1                          

      



             Grav)                                               



[facility] AND [id number] 08:19:00





             Test Item    Value        Reference Range Interpretation Comments

 

             UA pH (test code = UA pH) 5.0 1        5.0-8.0                   



[facility] [id number] 08:19:00





             Test Item    Value        Reference Range Interpretation Comments

 

             UA Protein (test code = UA Negative mg/dL                          

 



             Protein)                                            



[facility] [id number] 08:19:00





             Test Item    Value        Reference Range Interpretation Comments

 

             UA Glucose (test code = UA Negative mg/dL                          

 



             Glucose)                                            



[facility] [id number] 08:19:00





             Test Item    Value        Reference Range Interpretation Comments

 

             UA Ketones (test code = UA Negative mg/dL                          

 



             Ketones)                                            



[facility] [id number] 08:19:00





             Test Item    Value        Reference Range Interpretation Comments

 

             UA Bili (test code = Negative *NA*(22                          

 



             UA Bili)     3:19 AM)                               



[facility] [id number] 08:19:00





             Test Item    Value        Reference Range Interpretation Comments

 

             UA Blood (test code = Small *ABN*(22                           



             UA Blood)    3:19 AM)                               



[facility] [id number] 08:19:00





             Test Item    Value        Reference Range Interpretation Comments

 

             UA Urobilinogen (test code = UA no gt        0.1-1.0               

    



             Urobilinogen)                                        



[facility] AND [id number] 08:19:00





             Test Item    Value        Reference Range Interpretation Comments

 

             UA Nitrite (test code Positive *ABN*(22                        

   



             = UA Nitrite) 3:19 AM)                               



[facility] AND [id number] 08:19:00





             Test Item    Value        Reference Range Interpretation Comments

 

             UA Leuk Est (test code Large *ABN*(22 3:19                     

      



             = UA Leuk Est) AM)                                    



[facility] AND [id number] 08:19:00





             Test Item    Value        Reference Range Interpretation Comments

 

             UA WBC (test code = no gt        See_Comment                [Automa

huma message] The



             UA WBC)                                             system which ge

nerated this



                                                                 result transmit

huma



                                                                 reference range

: <=5. The



                                                                 reference range

 was not



                                                                 used to interpr

et this



                                                                 result as zayda

l/abnormal.



[facility] AND [id number] 08:19:00





             Test Item    Value        Reference Range Interpretation Comments

 

             UA RBC (test code = 8            See_Comment                [Automa

huma message] The



             UA RBC)                                             system which ge

nerated this



                                                                 result transmit

huma



                                                                 reference range

: <=2. The



                                                                 reference range

 was not



                                                                 used to interpr

et this



                                                                 result as zayda

l/abnormal.



[facility] [id number] 08:19:00





             Test Item    Value        Reference Range Interpretation Comments

 

             UA Bacteria (test code = UA Occasional /HPF                        

   



             Bacteria)                                           



[facility] [id number] 08:19:00





             Test Item    Value        Reference Range Interpretation Comments

 

             UA Mucus (test code = UA Mucus) Few /LPF                           

    



[facility] [id number] 08:19:00





             Test Item    Value        Reference Range Interpretation Comments

 

             UA Amorph Ivonne (test code = Occasional /HPF                        

   



             UA Amorph Ivonne)                                        



[facility] [id number] 08:19:00





             Test Item    Value        Reference Range Interpretation Comments

 

             UA Sq Epi (test code = UA Sq Epi) None Seen                        

      



[facility] [id number] [facility]:19:00





             Test Item    Value        Reference Range Interpretation Comments

 

             Culture: Urine (test Holding For Better                           



             code = Culture: Urine) Growth                                 



[facility] [id number] 00:20:00





             Test Item    Value        Reference Range Interpretation Comments

 

             ABO/Rh (test code = ABO/Rh) AB POS                                 



[facility] [id number] 00:20:00





             Test Item    Value        Reference Range Interpretation Comments

 

             Antibody Scrn (test Negative (22 7:20                          

 



             code = Antibody Scrn) PM)                                    



[facility] [employer] [id number] 00:20:00





             Test Item    Value        Reference Range Interpretation Comments

 

             Glucose Lvl (test code = Glucose Lvl) 74           70-99           

          



[facility] [employer] [id number] 00:20:00





             Test Item    Value        Reference Range Interpretation Comments

 

             BUN (test code = BUN) 15           -22                      



[facility] [id number] 00:20:00





             Test Item    Value        Reference Range Interpretation Comments

 

             Creatinine Lvl (test code = Creatinine 0.61         0.50-1.40      

           



             Lvl)                                                



[facility] [employer] [id number] 00:20:00





             Test Item    Value        Reference Range Interpretation Comments

 

             Sodium Lvl (test code = Sodium Lvl) 139          135-145           

        



[facility] [employer] [id number] 00:20:00





             Test Item    Value        Reference Range Interpretation Comments

 

             Potassium Lvl (test code = Potassium 4.9          3.5-5.1          

         



             Lvl)                                                



[facility] [employer] [id number] 00:20:00





             Test Item    Value        Reference Range Interpretation Comments

 

             Chloride Lvl (test code = Chloride Lvl) 105                  

            



[facility] [id number] 00:20:00





             Test Item    Value        Reference Range Interpretation Comments

 

             CO2 (test code = CO2) 30           24-32                     



[facility] [employer] [id number] 00:20:00





             Test Item    Value        Reference Range Interpretation Comments

 

             Calcium Lvl (test code = Calcium Lvl) 9.5          8.5-10.5        

          



[facility] [employer] [id number] 00:20:00





             Test Item    Value        Reference Range Interpretation Comments

 

             AGAP (test code = AGAP) 8.9          10.0-20.0                 



[facility] [id number] [facility]:20:00





             Test Item    Value        Reference Range Interpretation Comments

 

             eGFR (test code = eGFR) 129                                    



[facility] [id number] 00:20:00





             Test Item    Value        Reference Range Interpretation Comments

 

             Lactic Acid Lvl (test code = Lactic 1.2          0.5-2.2           

        



             Acid Lvl)                                           



[facility] [id number] 00:20:00





             Test Item    Value        Reference Range Interpretation Comments

 

             Procalcitonin Lvl (test 0.09         See_Comment                [Au

tomated message]



             code = Procalcitonin Lvl)                                        Th

e system which



                                                                 generated this 

result



                                                                 transmitted ref

erence



                                                                 range: <=0.10. 

The



                                                                 reference range

 was not



                                                                 used to interpr

et this



                                                                 result as



                                                                 normal/abnormal

.



[facility] [id number] 00:20:00





             Test Item    Value        Reference Range Interpretation Comments

 

             WBC (test code = WBC) 10.1         3.7-10.4                  



[place] [id number] 00:20:00





             Test Item    Value        Reference Range Interpretation Comments

 

             RBC (test code = RBC) 5.14         4.70-6.10                 



[facility] [id number] 00:20:00





             Test Item    Value        Reference Range Interpretation Comments

 

             Hgb (test code = Hgb) 15.5         14.0-18.0                 



[facility] [id number] 00:20:00





             Test Item    Value        Reference Range Interpretation Comments

 

             Hct (test code = Hct) 46.5         42.0-54.0                 



[place] [id number] 00:20:00





             Test Item    Value        Reference Range Interpretation Comments

 

             MCV (test code = MCV) 90.5         80.0-94.0                 



[place] [id number] 00:20:00





             Test Item    Value        Reference Range Interpretation Comments

 

             MCH (test code = MCH) 30.1 pg      27.0-31.0                 



[place] 726962-04-09 00:20:00





             Test Item    Value        Reference Range Interpretation Comments

 

             MCHC (test code = MCHC) 33.3         32.0-36.0                 



[place] [id number] 00:20:00





             Test Item    Value        Reference Range Interpretation Comments

 

             RDW (test code = RDW) 15.7         11.5-14.5                 



[place] 726962-04-09 00:20:00





             Test Item    Value        Reference Range Interpretation Comments

 

             Platelet (test code = Platelet) 302          133-450               

    



[facility] [id number] 00:20:00





             Test Item    Value        Reference Range Interpretation Comments

 

             MPV (test code = MPV) 8.9          7.4-10.4                  



[facility] [id number] 00:20:00





             Test Item    Value        Reference Range Interpretation Comments

 

             Sed Rate (test code = 17           See_Comment                [Auto

mated message] The



             Sed Rate)                                           system which ge

nerated this



                                                                 result transmit

huma



                                                                 reference range

: <=15. The



                                                                 reference range

 was not



                                                                 used to interpr

et this



                                                                 result as zayda

l/abnormal.



[facility] [id number] 00:20:00





             Test Item    Value        Reference Range Interpretation Comments

 

             PT (test code = PT) 13.1 s       12.0-14.7                 



[facility] [id number] 00:20:00





             Test Item    Value        Reference Range Interpretation Comments

 

             INR (test code = INR) 1.00 1       0.85-1.17                 



[facility] [id number] 00:20:00





             Test Item    Value        Reference Range Interpretation Comments

 

             PTT (test code = PTT) 31.5 s       22.9-35.8                 



[facility] CenterDwayhqvWYMYCNTMAD0715-37-47 00:20:00





             Test Item    Value        Reference Range Interpretation Comments

 

             Segs (test code = Segs) 68.7         45.0-75.0                 



[facility] CenterSnobrhnZBWBEXVYCU7416-17-18 00:20:00





             Test Item    Value        Reference Range Interpretation Comments

 

             Lymphocytes (test code = Lymphocytes) 19.6         20.0-40.0       

          



[facility] [id number] 00:20:00





             Test Item    Value        Reference Range Interpretation Comments

 

             Monocytes (test code = Monocytes) 9.4          2.0-12.0            

      



[place] 726962-04-09 00:20:00





             Test Item    Value        Reference Range Interpretation Comments

 

             Eosinophils (test code = 1.4          See_Comment                [A

utomated message] The



             Eosinophils)                                        system which ge

nerated



                                                                 this result tra

nsmitted



                                                                 reference range

: <=4.0.



                                                                 The reference r

zhen was



                                                                 not used to int

erpret



                                                                 this result as



                                                                 normal/abnormal

.



[place] 726962-04-09 00:20:00





             Test Item    Value        Reference Range Interpretation Comments

 

             Basophils (test code = 0.9          See_Comment                [Aut

omated message] The



             Basophils)                                          system which ge

nerated



                                                                 this result tra

nsmitted



                                                                 reference range

: <=1.0.



                                                                 The reference r

zhen was



                                                                 not used to int

erpret



                                                                 this result as



                                                                 normal/abnormal

.



[facility] [id number] 00:20:00





             Test Item    Value        Reference Range Interpretation Comments

 

             Neutrophils # (test code = Neutrophils 6.9          1.5-8.1        

           



             #)                                                  



[facility] CenterXijgsmmPSGNEWWLDI5259-28-14 00:20:00





             Test Item    Value        Reference Range Interpretation Comments

 

             Lymphocytes # (test code = Lymphocytes 2.0          1.0-5.5        

           



             #)                                                  



[facility] C[id number] 00:20:00





             Test Item    Value        Reference Range Interpretation Comments

 

             Monocytes # (test code 1.0          See_Comment                [Aut

omated message] The



             = Monocytes #)                                        system which 

generated



                                                                 this result tra

nsmitted



                                                                 reference range

: <=0.8.



                                                                 The reference r

zhen was



                                                                 not used to int

erpret



                                                                 this result as



                                                                 normal/abnormal

.



[facility] [id number] 00:20:00





             Test Item    Value        Reference Range Interpretation Comments

 

             Eosinophils # (test code 0.1          See_Comment                [A

utomated message] The



             = Eosinophils #)                                        system whic

h generated



                                                                 this result tra

nsmitted



                                                                 reference range

: <=0.5.



                                                                 The reference r

zhen was



                                                                 not used to int

erpret



                                                                 this result as



                                                                 normal/abnormal

.



[facility] [id number] 00:20:00





             Test Item    Value        Reference Range Interpretation Comments

 

             Basophils # (test code 0.1          See_Comment                [Aut

omated message] The



             = Basophils #)                                        system which 

generated



                                                                 this result tra

nsmitted



                                                                 reference range

: <=0.2.



                                                                 The reference r

zhen was



                                                                 not used to int

erpret



                                                                 this result as



                                                                 normal/abnormal

.



[facility] CenterVsmkdnhQOKUZZNGCQ3097-06-36 00:20:00





             Test Item    Value        Reference Range Interpretation Comments

 

             C-REACTIVE PROTEIN (test code = 13.2                               

    



             C-REACTIVE PROTEIN)                                        



[facility] [id number] 00:20:00





             Test Item    Value        Reference Range Interpretation Comments

 

             ABO/Rh (test code = ABO/Rh) AB POS                                 



[facility] [id number] 00:20:00





             Test Item    Value        Reference Range Interpretation Comments

 

             Antibody Scrn (test Negative (48/22 7:20                          

 



             code = Antibody Scrn) PM)                                    



[facility]2022-04-09 00:20:00





             Test Item    Value        Reference Range Interpretation Comments

 

             Glucose Lvl (test code = Glucose Lvl) 74           70-99           

          



[facility]2022-04-09 00:20:00





             Test Item    Value        Reference Range Interpretation Comments

 

             BUN (test code = BUN) 15           7-22                      



[place] 805122-04-09 00:20:00





             Test Item    Value        Reference Range Interpretation Comments

 

             Creatinine Lvl (test code = Creatinine 0.61         0.50-1.40      

           



             Lvl)                                                



[facility]2022-04-09 00:20:00





             Test Item    Value        Reference Range Interpretation Comments

 

             Sodium Lvl (test code = Sodium Lvl) 139          135-145           

        



[place] 805122-04-09 00:20:00





             Test Item    Value        Reference Range Interpretation Comments

 

             Potassium Lvl (test code = Potassium 4.9          3.5-5.1          

         



             Lvl)                                                



[place] 805122-04-09 00:20:00





             Test Item    Value        Reference Range Interpretation Comments

 

             Chloride Lvl (test code = Chloride Lvl) 105                  

            



[facility]2022-04-09 00:20:00





             Test Item    Value        Reference Range Interpretation Comments

 

             CO2 (test code = CO2) 30           24-32                     



[facility]2022-04-09 00:20:00





             Test Item    Value        Reference Range Interpretation Comments

 

             Calcium Lvl (test code = Calcium Lvl) 9.5          8.5-10.5        

          



[place] [id number] 00:20:00





             Test Item    Value        Reference Range Interpretation Comments

 

             AGAP (test code = AGAP) 8.9          10.0-20.0                 



[place] 805122-04-09 00:20:00





             Test Item    Value        Reference Range Interpretation Comments

 

             eGFR (test code = eGFR) 129                                    



[facility]2022-04-09 00:20:00





             Test Item    Value        Reference Range Interpretation Comments

 

             Lactic Acid Lvl (test code = Lactic 1.2          0.5-2.2           

        



             Acid Lvl)                                           



[place] 805122-04-09 00:20:00





             Test Item    Value        Reference Range Interpretation Comments

 

             Procalcitonin Lvl (test 0.09         See_Comment                [Au

tomated message]



             code = Procalcitonin Lvl)                                        Th

e system which



                                                                 generated this 

result



                                                                 transmitted ref

erence



                                                                 range: <=0.10. 

The



                                                                 reference range

 was not



                                                                 used to interpr

et this



                                                                 result as



                                                                 normal/abnormal

.



[facility] CenterSheunexAGFWKCJOGO2219-89-50 00:20:00





             Test Item    Value        Reference Range Interpretation Comments

 

             WBC (test code = WBC) 10.1         3.7-10.4                  



[place] 726962-04-09 00:20:00





             Test Item    Value        Reference Range Interpretation Comments

 

             RBC (test code = RBC) 5.14         4.70-6.10                 



[facility] CenterRctsasyCHBYWBDDEG6874-75-74 00:20:00





             Test Item    Value        Reference Range Interpretation Comments

 

             Hgb (test code = Hgb) 15.5         14.0-18.0                 



[place] [address]-04-09 00:20:00





             Test Item    Value        Reference Range Interpretation Comments

 

             Hct (test code = Hct) 46.5         42.0-54.0                 



[place] 726962-04-09 00:20:00





             Test Item    Value        Reference Range Interpretation Comments

 

             MCV (test code = MCV) 90.5         80.0-94.0                 



[place] 726962-04-09 00:20:00





             Test Item    Value        Reference Range Interpretation Comments

 

             MCH (test code = MCH) 30.1 pg      27.0-31.0                 



[place] 726962-04-09 00:20:00





             Test Item    Value        Reference Range Interpretation Comments

 

             MCHC (test code = MCHC) 33.3         32.0-36.0                 



[place] 726962-04-09 00:20:00





             Test Item    Value        Reference Range Interpretation Comments

 

             RDW (test code = RDW) 15.7         11.5-14.5                 



[place] 726962-04-09 00:20:00





             Test Item    Value        Reference Range Interpretation Comments

 

             Platelet (test code = Platelet) 302          133-450               

    



[place] 726962-04-09 00:20:00





             Test Item    Value        Reference Range Interpretation Comments

 

             MPV (test code = MPV) 8.9          7.4-10.4                  



[place] [address]-04-09 00:20:00





             Test Item    Value        Reference Range Interpretation Comments

 

             Sed Rate (test code = 17           See_Comment                [Auto

mated message] The



             Sed Rate)                                           system which ge

nerated this



                                                                 result transmit

huma



                                                                 reference range

: <=15. The



                                                                 reference range

 was not



                                                                 used to interpr

et this



                                                                 result as zayda

l/abnormal.



[facility] CenterMfovpucNCCZRULVDL4621-84-28 00:20:00





             Test Item    Value        Reference Range Interpretation Comments

 

             PT (test code = PT) 13.1 s       12.0-14.7                 



[place] 726962-04-09 00:20:00





             Test Item    Value        Reference Range Interpretation Comments

 

             INR (test code = INR) 1.00 1       0.85-1.17                 



[place] 726962-04-09 00:20:00





             Test Item    Value        Reference Range Interpretation Comments

 

             PTT (test code = PTT) 31.5 s       22.9-35.8                 



[place] 726962-04-09 00:20:00





             Test Item    Value        Reference Range Interpretation Comments

 

             Segs (test code = Segs) 68.7         45.0-75.0                 



[place] [address]-04-09 00:20:00





             Test Item    Value        Reference Range Interpretation Comments

 

             Lymphocytes (test code = Lymphocytes) 19.6         20.0-40.0       

          



[place] 726962-04-09 00:20:00





             Test Item    Value        Reference Range Interpretation Comments

 

             Monocytes (test code = Monocytes) 9.4          2.0-12.0            

      



[place] 726962-04-09 00:20:00





             Test Item    Value        Reference Range Interpretation Comments

 

             Eosinophils (test code = 1.4          See_Comment                [A

utomated message] The



             Eosinophils)                                        system which ge

nerated



                                                                 this result tra

nsmitted



                                                                 reference range

: <=4.0.



                                                                 The reference r

zhen was



                                                                 not used to int

erpret



                                                                 this result as



                                                                 normal/abnormal

.



[facility] CenterAjxjlcqRSSBPFTOYQ3449-32-52 00:20:00





             Test Item    Value        Reference Range Interpretation Comments

 

             Basophils (test code = 0.9          See_Comment                [Aut

omated message] The



             Basophils)                                          system which ge

nerated



                                                                 this result tra

nsmitted



                                                                 reference range

: <=1.0.



                                                                 The reference r

zhen was



                                                                 not used to int

erpret



                                                                 this result as



                                                                 normal/abnormal

.



[facility] CenterRuktgsqXVWIYPJDZX7645-22-98 00:20:00





             Test Item    Value        Reference Range Interpretation Comments

 

             Neutrophils # (test code = Neutrophils 6.9          1.5-8.1        

           



             #)                                                  



[facility] CenterIqgftmvVCNRSBBWJP4852-89-79 00:20:00





             Test Item    Value        Reference Range Interpretation Comments

 

             Lymphocytes # (test code = Lymphocytes 2.0          1.0-5.5        

           



             #)                                                  



[facility] CenterBbsixjvHQQOXKPXHL2838-46-54 [facility]:20:00





             Test Item    Value        Reference Range Interpretation Comments

 

             Monocytes # (test code 1.0          See_Comment                [Aut

omated message] The



             = Monocytes #)                                        system which 

generated



                                                                 this result tra

nsmitted



                                                                 reference range

: <=0.8.



                                                                 The reference r

zhen was



                                                                 not used to int

erpret



                                                                 this result as



                                                                 normal/abnormal

.



[facility]JB[facility]UCN[facility]204[facility]





             Test Item    Value        Reference Range Interpretation Comments

 

             Eosinophils # (test code 0.1          See_Comment                [A

utomated message] The



             = Eosinophils #)                                        system whic

h generated



                                                                 this result tra

nsmitted



                                                                 reference range

: <=0.5.



                                                                 The reference r

zhen was



                                                                 not used to int

erpret



                                                                 this result as



                                                                 normal/abnormal

.



[facility] CenterGuhhvzaNGTGQHLXHI6789-70-24 00:20:00





             Test Item    Value        Reference Range Interpretation Comments

 

             Basophils # (test code 0.1          See_Comment                [Aut

omated message] The



             = Basophils #)                                        system which 

generated



                                                                 this result tra

nsmitted



                                                                 reference range

: <=0.2.



                                                                 The reference r

zhen was



                                                                 not used to int

erpret



                                                                 this result as



                                                                 normal/abnormal

.



[facility] CenterLyylqazWTDQLPHBRS2956-45-88 00:20:00





             Test Item    Value        Reference Range Interpretation Comments

 

             C-REACTIVE PROTEIN (test code = 13.2                               

    



             C-REACTIVE PROTEIN)                                        



[facility] CenterWellpepper [id number] 00:20:00





             Test Item    Value        Reference Range Interpretation Comments

 

             ABO/Rh (test code = ABO/Rh) AB POS                                 



[facility] triptap [id number] 00:20:00





             Test Item    Value        Reference Range Interpretation Comments

 

             Antibody Scrn (test Negative (22 7:20                          

 



             code = Antibody Scrn) PM)                                    



[facility] triptap [id number] 00:20:00





             Test Item    Value        Reference Range Interpretation Comments

 

             ABO/Rh (test code = ABO/Rh) AB POS                                 



[facility] triptap [id number] 00:20:00





             Test Item    Value        Reference Range Interpretation Comments

 

             Antibody Scrn (test Negative (22 7:20                          

 



             code = Antibody Scrn) PM)                                    



[facility] [employer]CHE[employer] [id number] 00:20:00





             Test Item    Value        Reference Range Interpretation Comments

 

             Glucose Lvl (test code = Glucose Lvl) 74           70-99           

          



[facility]2022-04-09 00:20:00





             Test Item    Value        Reference Range Interpretation Comments

 

             BUN (test code = BUN) 15           7-22                      



[place] 805122-04-09 00:20:00





             Test Item    Value        Reference Range Interpretation Comments

 

             Creatinine Lvl (test code = Creatinine 0.61         0.50-1.40      

           



             Lvl)                                                



[place] 805122-04-09 00:20:00





             Test Item    Value        Reference Range Interpretation Comments

 

             Sodium Lvl (test code = Sodium Lvl) 139          135-145           

        



[place] 805122-04-09 00:20:00





             Test Item    Value        Reference Range Interpretation Comments

 

             Glucose Lvl (test code = Glucose Lvl) 74           70-99           

          



[place] 805122-04-09 00:20:00





             Test Item    Value        Reference Range Interpretation Comments

 

             Potassium Lvl (test code = Potassium 4.9          3.5-5.1          

         



             Lvl)                                                



[place] 805122-04-09 00:20:00





             Test Item    Value        Reference Range Interpretation Comments

 

             Chloride Lvl (test code = Chloride Lvl) 105                  

            



[place] 805122-04-09 00:20:00





             Test Item    Value        Reference Range Interpretation Comments

 

             CO2 (test code = CO2) 30           24-32                     



[place] 805122-04-09 00:20:00





             Test Item    Value        Reference Range Interpretation Comments

 

             Calcium Lvl (test code = Calcium Lvl) 9.5          8.5-10.5        

          



[place] 805122-04-09 00:20:00





             Test Item    Value        Reference Range Interpretation Comments

 

             AGAP (test code = AGAP) 8.9          10.0-20.0                 



[facility]2022-04-09 00:20:00





             Test Item    Value        Reference Range Interpretation Comments

 

             eGFR (test code = eGFR) 129                                    



[place] 805122-04-09 00:20:00





             Test Item    Value        Reference Range Interpretation Comments

 

             Lactic Acid Lvl (test code = Lactic 1.2          0.5-2.2           

        



             Acid Lvl)                                           



[place] 805122-04-09 00:20:00





             Test Item    Value        Reference Range Interpretation Comments

 

             Procalcitonin Lvl (test 0.09         See_Comment                [Au

tomated message]



             code = Procalcitonin Lvl)                                        

e system which



                                                                 generated this 

result



                                                                 transmitted ref

erence



                                                                 range: <=0.10. 

The



                                                                 reference range

 was not



                                                                 used to interpr

et this



                                                                 result as



                                                                 normal/abnormal

.



[facility] CenterT[id number] 00:20:00





             Test Item    Value        Reference Range Interpretation Comments

 

             WBC (test code = WBC) 10.1         3.7-10.4                  



[place] 726962-04-09 00:20:00





             Test Item    Value        Reference Range Interpretation Comments

 

             RBC (test code = RBC) 5.14         4.70-6.10                 



[facility]2022-04-09 00:20:00





             Test Item    Value        Reference Range Interpretation Comments

 

             BUN (test code = BUN) 15           7-22                      



[facility] CenterYtriqreDADFDJCREP0815-58-60 00:20:00





             Test Item    Value        Reference Range Interpretation Comments

 

             Hgb (test code = Hgb) 15.5         14.0-18.0                 



[place] 726962-04-09 00:20:00





             Test Item    Value        Reference Range Interpretation Comments

 

             Hct (test code = Hct) 46.5         42.0-54.0                 



[place] 726962-04-09 00:20:00





             Test Item    Value        Reference Range Interpretation Comments

 

             MCV (test code = MCV) 90.5         80.0-94.0                 



[facility] CenterWipjytpKTAARASDYP1625-05-43 00:20:00





             Test Item    Value        Reference Range Interpretation Comments

 

             MCH (test code = MCH) 30.1 pg      27.0-31.0                 



[facility] CenterNtsfgifRVQKVLEIHS0899-18-28 00:20:00





             Test Item    Value        Reference Range Interpretation Comments

 

             MCHC (test code = MCHC) 33.3         32.0-36.0                 



[place] 726962-04-09 00:20:00





             Test Item    Value        Reference Range Interpretation Comments

 

             RDW (test code = RDW) 15.7         11.5-14.5                 



[facility] CenterHbjrjfbWAVKYJMNCF8478-73-46 00:20:00





             Test Item    Value        Reference Range Interpretation Comments

 

             Platelet (test code = Platelet) 302          133-450               

    



[facility] CenterCpsgacgNRQBCWKEVY2542-65-28 00:20:00





             Test Item    Value        Reference Range Interpretation Comments

 

             MPV (test code = MPV) 8.9          7.4-10.4                  



[place] [address]-04-09 00:20:00





             Test Item    Value        Reference Range Interpretation Comments

 

             Sed Rate (test code = 17           See_Comment                [Auto

mated message] The



             Sed Rate)                                           system which ge

nerated this



                                                                 result transmit

huma



                                                                 reference range

: <=15. The



                                                                 reference range

 was not



                                                                 used to interpr

et this



                                                                 result as zayda

l/abnormal.



[facility] CenterShpbuvgGEQQMGVZBA5915-56-83 00:20:00





             Test Item    Value        Reference Range Interpretation Comments

 

             PT (test code = PT) 13.1 s       12.0-14.7                 



[facility]2022-04-09 00:20:00





             Test Item    Value        Reference Range Interpretation Comments

 

             Creatinine Lvl (test code = Creatinine 0.61         0.50-1.40      

           



             Lvl)                                                



[facility] CenterHfjcxkqSVHRGDLTWS7830-04-35 00:20:00





             Test Item    Value        Reference Range Interpretation Comments

 

             INR (test code = INR) 1.00 1       0.85-1.17                 



[facility] CenterEdjoxsmNEEUTUPYIK4840-01-06 00:20:00





             Test Item    Value        Reference Range Interpretation Comments

 

             PTT (test code = PTT) 31.5 s       22.9-35.8                 



[facility] CenterLllcowyCAXXNBDWMN4198-18-11 00:20:00





             Test Item    Value        Reference Range Interpretation Comments

 

             Segs (test code = Segs) 68.7         45.0-75.0                 



[facility] CenterPkmpguiTBVYJZVAPL0429-37-42 00:20:00





             Test Item    Value        Reference Range Interpretation Comments

 

             Lymphocytes (test code = Lymphocytes) 19.6         20.0-40.0       

          



[facility] CenterDmnlddgXSZVXMRXEP6959-41-68 00:20:00





             Test Item    Value        Reference Range Interpretation Comments

 

             Monocytes (test code = Monocytes) 9.4          2.0-12.0            

      



[place] 726962-04-09 00:20:00





             Test Item    Value        Reference Range Interpretation Comments

 

             Eosinophils (test code = 1.4          See_Comment                [A

utomated message] The



             Eosinophils)                                        system which ge

nerated



                                                                 this result tra

nsmitted



                                                                 reference range

: <=4.0.



                                                                 The reference r

zhen was



                                                                 not used to int

erpret



                                                                 this result as



                                                                 normal/abnormal

.



[facility] CenterVvudzqgSYNYKUWHGF7004-68-97 00:20:00





             Test Item    Value        Reference Range Interpretation Comments

 

             Basophils (test code = 0.9          See_Comment                [Aut

omated message] The



             Basophils)                                          system which ge

nerated



                                                                 this result tra

nsmitted



                                                                 reference range

: <=1.0.



                                                                 The reference r

zhen was



                                                                 not used to int

erpret



                                                                 this result as



                                                                 normal/abnormal

.



[place] [address]-04-09 00:20:00





             Test Item    Value        Reference Range Interpretation Comments

 

             Neutrophils # (test code = Neutrophils 6.9          1.5-8.1        

           



             #)                                                  



[facility] CenterUycccnaJMFFIEVZLI5186-56-62 00:20:00





             Test Item    Value        Reference Range Interpretation Comments

 

             Lymphocytes # (test code = Lymphocytes 2.0          1.0-5.5        

           



             #)                                                  



[facility] CenterPhjulglMJXJHAYZHA0100-06-22 00:20:00





             Test Item    Value        Reference Range Interpretation Comments

 

             Monocytes # (test code 1.0          See_Comment                [Aut

omated message] The



             = Monocytes #)                                        system which 

generated



                                                                 this result tra

nsmitted



                                                                 reference range

: <=0.8.



                                                                 The reference r

zhen was



                                                                 not used to int

erpret



                                                                 this result as



                                                                 normal/abnormal

.



[facility] [id number]00





             Test Item    Value        Reference Range Interpretation Comments

 

             Sodium Lvl (test code = Sodium Lvl) 139          135-145           

        



[facility]HnkexlcMXJKDJIFXS6532-60-15 00:20:00





             Test Item    Value        Reference Range Interpretation Comments

 

             Eosinophils # (test code 0.1          See_Comment                [A

utomated message] The



             = Eosinophils #)                                        system whic

h generated



                                                                 this result tra

nsmitted



                                                                 reference range

: <=0.5.



                                                                 The reference r

zhen was



                                                                 not used to int

erpret



                                                                 this result as



                                                                 normal/abnormal

.



[facility] CenterBkzixrzSVAHWAMXEZ5243-96-74 [facility]:00





             Test Item    Value        Reference Range Interpretation Comments

 

             Basophils # (test code 0.1          See_Comment                [Aut

omated message] The



             = Basophils #)                                        system which 

generated



                                                                 this result tra

nsmitted



                                                                 reference range

: <=0.2.



                                                                 The reference r

zhen was



                                                                 not used to int

erpret



                                                                 this result as



                                                                 normal/abnormal

.



[facility] CenterVzlnhrkYRHXGVZMYQ6155-03-61 00:20:00





             Test Item    Value        Reference Range Interpretation Comments

 

             C-REACTIVE PROTEIN (test code = 13.2                               

    



             C-REACTIVE PROTEIN)                                        



[id number] [facility]





             Test Item    Value        Reference Range Interpretation Comments

 

             Potassium Lvl (test code = Potassium 4.9          3.5-5.1          

         



             Lvl)                                                



[facility] [id number] 00:20:00





             Test Item    Value        Reference Range Interpretation Comments

 

             Chloride Lvl (test code = Chloride Lvl) 105                  

            



[facility] EFRVQ5976-06-03 00:20:00





             Test Item    Value        Reference Range Interpretation Comments

 

             CO2 (test code = CO2) 30           24-32                     



[facility]2022-04-09 00:20:00





             Test Item    Value        Reference Range Interpretation Comments

 

             Calcium Lvl (test code = Calcium Lvl) 9.5          8.5-10.5        

          



[place] 805122-04-09 00:20:00





             Test Item    Value        Reference Range Interpretation Comments

 

             AGAP (test code = AGAP) 8.9          10.0-20.0                 



[place] 805122-04-09 00:20:00





             Test Item    Value        Reference Range Interpretation Comments

 

             eGFR (test code = eGFR) 129                                    



[facility]2022-04-09 00:20:00





             Test Item    Value        Reference Range Interpretation Comments

 

             Lactic Acid Lvl (test code = Lactic 1.2          0.5-2.2           

        



             Acid Lvl)                                           



[place] 805122-04-09 00:20:00





             Test Item    Value        Reference Range Interpretation Comments

 

             Procalcitonin Lvl (test 0.09         See_Comment                [Au

tomated message]



             code = Procalcitonin Lvl)                                        

e system which



                                                                 generated this 

result



                                                                 transmitted ref

erence



                                                                 range: <=0.10. 

The



                                                                 reference range

 was not



                                                                 used to interpr

et this



                                                                 result as



                                                                 normal/abnormal

.



[facility] CenterKxufpotIMSJBIJANJ0031-20-09 00:20:00





             Test Item    Value        Reference Range Interpretation Comments

 

             WBC (test code = WBC) 10.1         3.7-10.4                  



[place] 726962-04-09 00:20:00





             Test Item    Value        Reference Range Interpretation Comments

 

             RBC (test code = RBC) 5.14         4.70-6.10                 



[place] 726962-04-09 00:20:00





             Test Item    Value        Reference Range Interpretation Comments

 

             Hgb (test code = Hgb) 15.5         14.0-18.0                 



[place] [address]-04-09 00:20:00





             Test Item    Value        Reference Range Interpretation Comments

 

             Hct (test code = Hct) 46.5         42.0-54.0                 



[place] 726962-04-09 00:20:00





             Test Item    Value        Reference Range Interpretation Comments

 

             MCV (test code = MCV) 90.5         80.0-94.0                 



[place] [address]-04-09 00:20:00





             Test Item    Value        Reference Range Interpretation Comments

 

             MCH (test code = MCH) 30.1 pg      27.0-31.0                 



[facility] CenterSxgzjuyJBSJOETNNS7860-41-17 00:20:00





             Test Item    Value        Reference Range Interpretation Comments

 

             MCHC (test code = MCHC) 33.3         32.0-36.0                 



[facility] CenterHeckruzUJMUPJJFID7819-47-80 00:20:00





             Test Item    Value        Reference Range Interpretation Comments

 

             RDW (test code = RDW) 15.7         11.5-14.5                 



[facility] CenterBdegekkCAZADRBIBW7026-87-36 00:20:00





             Test Item    Value        Reference Range Interpretation Comments

 

             Platelet (test code = Platelet) 302          133-450               

    



[facility] CenterSbxhjspHFVESZLZON2674-39-77 00:20:00





             Test Item    Value        Reference Range Interpretation Comments

 

             MPV (test code = MPV) 8.9          7.4-10.4                  



[facility] CenterXifeyymXNHOVJYBIM6826-58-78 00:20:00





             Test Item    Value        Reference Range Interpretation Comments

 

             Sed Rate (test code = 17           See_Comment                [Auto

mated message] The



             Sed Rate)                                           system which ge

nerated this



                                                                 result transmit

huma



                                                                 reference range

: <=15. The



                                                                 reference range

 was not



                                                                 used to interpr

et this



                                                                 result as zayda

l/abnormal.



[facility] CenterYqrcobhJDGFVVFCIC7132-32-12 00:20:00





             Test Item    Value        Reference Range Interpretation Comments

 

             PT (test code = PT) 13.1 s       12.0-14.7                 



[facility] CenterUaohtwiAQSTTFRTEG3821-71-98 00:20:00





             Test Item    Value        Reference Range Interpretation Comments

 

             INR (test code = INR) 1.00 1       0.85-1.17                 



[facility] CenterHopupzhWKENMWMZDJ7790-83-10 00:20:00





             Test Item    Value        Reference Range Interpretation Comments

 

             PTT (test code = PTT) 31.5 s       22.9-35.8                 



[facility] CenterFuspdzwGILSBNNIUQ0996-53-63 00:20:00





             Test Item    Value        Reference Range Interpretation Comments

 

             Segs (test code = Segs) 68.7         45.0-75.0                 



[facility] CenterWckzezkGUZBTVGVGC7796-62-08 00:20:00





             Test Item    Value        Reference Range Interpretation Comments

 

             Lymphocytes (test code = Lymphocytes) 19.6         20.0-40.0       

          



[facility] CenterWxaifcjKRMOKZKFXU4370-59-15 00:20:00





             Test Item    Value        Reference Range Interpretation Comments

 

             Monocytes (test code = Monocytes) 9.4          2.0-12.0            

      



[place] 726962-04-09 00:20:00





             Test Item    Value        Reference Range Interpretation Comments

 

             Eosinophils (test code = 1.4          See_Comment                [A

utomated message] The



             Eosinophils)                                        system which ge

nerated



                                                                 this result tra

nsmitted



                                                                 reference range

: <=4.0.



                                                                 The reference r

zhen was



                                                                 not used to int

erpret



                                                                 this result as



                                                                 normal/abnormal

.



[facility] CenterPcethtxRWWDAXHKHG4816-20-55 00:20:00





             Test Item    Value        Reference Range Interpretation Comments

 

             Basophils (test code = 0.9          See_Comment                [Aut

omated message] The



             Basophils)                                          system which ge

nerated



                                                                 this result tra

nsmitted



                                                                 reference range

: <=1.0.



                                                                 The reference r

zhen was



                                                                 not used to int

erpret



                                                                 this result as



                                                                 normal/abnormal

.



[facility] CenterTqozxmmUNWTBWDJYY8078-83-09 00:20:00





             Test Item    Value        Reference Range Interpretation Comments

 

             Neutrophils # (test code = Neutrophils 6.9          1.5-8.1        

           



             #)                                                  



[facility] CenterDaxsmgdBQPNQMRLCD2807-19-50 00:20:00





             Test Item    Value        Reference Range Interpretation Comments

 

             Lymphocytes # (test code = Lymphocytes 2.0          1.0-5.5        

           



             #)                                                  



[facility] CenterQhfkufiCCYWAHJLKJ4447-97-49 00:20:00





             Test Item    Value        Reference Range Interpretation Comments

 

             Monocytes # (test code 1.0          See_Comment                [Aut

omated message] The



             = Monocytes #)                                        system which 

generated



                                                                 this result tra

nsmitted



                                                                 reference range

: <=0.8.



                                                                 The reference r

zhen was



                                                                 not used to int

erpret



                                                                 this result as



                                                                 normal/abnormal

.



[facility] CenterZmmlitzJRUCMRHYOL2326-25-08 00:20:00





             Test Item    Value        Reference Range Interpretation Comments

 

             Eosinophils # (test code 0.1          See_Comment                [A

utomated message] The



             = Eosinophils #)                                        system whic

h generated



                                                                 this result tra

nsmitted



                                                                 reference range

: <=0.5.



                                                                 The reference r

zhen was



                                                                 not used to int

erpret



                                                                 this result as



                                                                 normal/abnormal

.



[facility] CenterGbxcrozXVWJEMAUBR8423-70-53 00:20:00





             Test Item    Value        Reference Range Interpretation Comments

 

             Basophils # (test code 0.1          See_Comment                [Aut

omated message] The



             = Basophils #)                                        system which 

generated



                                                                 this result tra

nsmitted



                                                                 reference range

: <=0.2.



                                                                 The reference r

zhen was



                                                                 not used to int

erpret



                                                                 this result as



                                                                 normal/abnormal

.



[facility] CenterAlppecdPUQCNLZVRX6020-13-33 00:20:00





             Test Item    Value        Reference Range Interpretation Comments

 

             C-REACTIVE PROTEIN (test code = 13.2                               

    



             C-REACTIVE PROTEIN)                                        



[facility] WITH [id number] 04:47:32





             Test Item    Value        Reference Range Interpretation Comments

 

             WBC (test code =              See_Comment  H             [Automated



             6690-2)                                             message] The sy

stem



                                                                 which generated



                                                                 this result



                                                                 transmitted



                                                                 reference range

:



                                                                 4.20 - 10.70



                                                                 10*3/?L. The



                                                                 reference range

 was



                                                                 not used to



                                                                 interpret this



                                                                 result as



                                                                 normal/abnormal

.

 

             RBC (test code =              See_Comment                [Automated



             789-8)                                              message] The sy

stem



                                                                 which generated



                                                                 this result



                                                                 transmitted



                                                                 reference range

:



                                                                 4.26 - 5.52



                                                                 10*6/?L. The



                                                                 reference range

 was



                                                                 not used to



                                                                 interpret this



                                                                 result as



                                                                 normal/abnormal

.

 

             HGB (test code = 16.1 g/dL    12.2-16.4                 



             718-7)                                              

 

             HCT (test code = 47.2 %       38.4-49.3                 



             4544-3)                                             

 

             MCV (test code = 86.1 fL      81.7-95.6                 



             787-2)                                              

 

             MCH (test code = 29.4 pg      26.1-32.7                 



             785-6)                                              

 

             MCHC (test code = 34.1 g/dL    31.2-35.0                 



             786-4)                                              

 

             RDW-SD (test code = 45.7 fL      38.5-51.6                 



             46830-9)                                            

 

             RDW-CV (test code = 14.6 %       12.1-15.4                 



             788-0)                                              

 

             PLT (test code =              See_Comment                [Automated



             777-3)                                              message] The sy

stem



                                                                 which generated



                                                                 this result



                                                                 transmitted



                                                                 reference range

:



                                                                 150 - 328 10*3/

?L.



                                                                 The reference r

zhen



                                                                 was not used to



                                                                 interpret this



                                                                 result as



                                                                 normal/abnormal

.

 

             MPV (test code = 11.0 fL      9.8-13.0                  



             44032-9)                                            

 

             NRBC/100 WBC (test              See_Comment                [Automat

ed



             code = [id number])                                        message] 

The system



                                                                 which generated



                                                                 this result



                                                                 transmitted



                                                                 reference range

:



                                                                 0.0 - 10.0 /100



                                                                 WBCs. The refer

ence



                                                                 range was not u

sed



                                                                 to interpret th

is



                                                                 result as



                                                                 normal/abnormal

.

 

             NRBC x10^3 (test code <0.01        See_Comment                [Auto

mated



             = [id number])                                        message] The s

ystem



                                                                 which generated



                                                                 this result



                                                                 transmitted



                                                                 reference range

:



                                                                 10*3/?L. The



                                                                 reference range

 was



                                                                 not used to



                                                                 interpret this



                                                                 result as



                                                                 normal/abnormal

.

 

             GRAN MAT (NEUT) % 72.2 %                                 



             (test code = 770-8)                                        

 

             IMM GRAN % (test code 0.60 %                                 



             = [id number])                                        

 

             LYMPH % (test code = 17.7 %                                 



             736-9)                                              

 

             MONO % (test code = 7.4 %                                  



             5905-5)                                             

 

             EOS % (test code = 1.8 %                                  



             713-8)                                              

 

             BASO % (test code = 0.3 %                                  



             706-2)                                              

 

             GRAN MAT x10^3(ANC) 9.09 10*3/uL 1.99-6.95    H            



             (test code =                                        



             [id number])                                         

 

             IMM GRAN x10^3 (test 0.07 10*3/uL 0.00-0.06    H            



             code = [id number])                                        

 

             LYMPH x10^3 (test code 2.22 10*3/uL 1.09-3.23                 



             = 731-0)                                            

 

             MONO x10^3 (test code 0.93 10*3/uL 0.36-1.02                 



             = 742-7)                                            

 

             EOS x10^3 (test code = 0.22 10*3/uL 0.06-0.53                 



             711-2)                                              

 

             BASO x10^3 (test code 0.04 10*3/uL 0.01-0.09                 



             = 704-7)                                            

 

             Lab Interpretation Abnormal                               



             (test code = 17332-4)                                        



[facility]. METABOLIC PANEL (71110)2022 
04:36:41





             Test Item    Value        Reference Range Interpretation Comments

 

             NA (test code = 138 mmol/L   135-145                   



             [id number])                                         

 

             K (test code = 4.6 mmol/L   3.5-5.0                   



             2428117870)                                         

 

             CL (test code = 105 mmol/L                       



             4157094395)                                         

 

             CO2 TOTAL (test code = 21 mmol/L    23-31        L            



             3366916164)                                         

 

             AGAP (test code =              2-16                      



             [id number])                                         

 

             BUN (test code = 13 mg/dL     7-23                      



             [id number])                                         

 

             GLUCOSE (test code = 167 mg/dL           H            



             [id number])                                         

 

             CREATININE (test code = 0.48 mg/dL   0.60-1.25    L            



             [id number])                                         

 

             TOTAL BILI (test code = 0.8 mg/dL    0.1-1.1                   



             8421481060)                                         

 

             CALCIUM (test code = 9.3 mg/dL    8.6-10.6                  



             0084549189)                                         

 

             T PROTEIN (test code = 7.6 g/dL     6.3-8.2                   



             0669104308)                                         

 

             ALBUMIN (test code = 4.2 g/dL     3.5-5.0                   



             5729445240)                                         

 

             ALK PHOS (test code = 98 U/L                           



             9904767679)                                         

 

             ALTv (test code = 71 U/L       5-50         H            



             1742-6)                                             

 

             AST(SGOT) (test code = 36 U/L       13-40                     



             [id number])                                         

 

             eGFR (test code =              mL/min/1.73m2              



             8967034067)                                         

 

             MAL (test code = MAL) Association of                           



                          Glomerular Filtration                           



                          Rate (GFR) and Staging                           



                          of Kidney Disease*                           



                          +---------------------                           



                          --+-------------------                           



                          --+-------------------                           



                          ------+| GFR                           



                          (mL/min/1.73 m2) ?|                           



                          With Kidney Damage ?|                           



                          ?Without Kidney                           



                          Damage+---------------                           



                          --------+-------------                           



                          --------+-------------                           



                          ------------+| ?>90 ?                           



                          ? ? ? ? ? ? ? ?|                           



                          ?Stage one ? ? ? ? ?|                           



                          ? Normal ? ? ? ? ? ? ?                           



                          ?+--------------------                           



                          ---+------------------                           



                          ---+------------------                           



                          -------+| ?60-89 ? ? ?                           



                          ? ? ? ? ?| ?Stage two                           



                          ? ? ? ? ?| ? Decreased                           



                          GFR ? ? ? ?                            



                          +---------------------                           



                          --+-------------------                           



                          --+-------------------                           



                          ------+| ?30-59 ? ? ?                           



                          ? ? ? ? ?| ?Stage                           



                          three ? ? ? ?| ? Stage                           



                          three ? ? ? ? ?                           



                          +---------------------                           



                          --+-------------------                           



                          --+-------------------                           



                          ------+| ?15-29 ? ? ?                           



                          ? ? ? ? ?| ?Stage four                           



                          ? ? ? ? | ? Stage four                           



                          ? ? ? ? ?                              



                          ?+--------------------                           



                          ---+------------------                           



                          ---+------------------                           



                          -------+| ?<15 (or                           



                          dialysis) ? ?| ?Stage                           



                          five ? ? ? ? | ? Stage                           



                          five ? ? ? ? ?                           



                          ?+--------------------                           



                          ---+------------------                           



                          ---+------------------                           



                          -------+ *Each stage                           



                          assumes the associated                           



                          GFR level has been in                           



                          effect for at least                           



                          three months. ?Stages                           



                          1 to 5, with or                           



                          without kidney                           



                          disease, indicate                           



                          chronic kidney                           



                          disease. Notes:                           



                          Determination of                           



                          stages one and two                           



                          (with eGFR                             



                          >59mL/min/1.73 m2)                           



                          requires estimation of                           



                          kidney damage for at                           



                          least three months as                           



                          defined by structural                           



                          or functional                           



                          abnormalities of the                           



                          kidney, manifested by                           



                          either:Pathological                           



                          abnormalities or                           



                          Markers of kidney                           



                          damage (including                           



                          abnormalities in the                           



                          composition of the                           



                          blood or urine or                           



                          abnormalities in                           



                          imaging tests).                           

 

             Lab Interpretation Abnormal                               



             (test code = 73693-0)                                        



[facility]





             Test Item    Value        Reference Range Interpretation Comments

 

             MAGNESIUM (test code = 5667982701) 1.6 mg/dL    1.7-2.4      L     

       

 

             Lab Interpretation (test code = Abnormal                           

    



             60238-3)                                            



[id number]





             Test Item    Value        Reference Range Interpretation Comments

 

             ABO/Rh (test code = ABO/Rh) AB POS                                 



[facility] [id number] 07:52:00





             Test Item    Value        Reference Range Interpretation Comments

 

             Antibody Scrn (test Negative (3/28/22 2:52                         

  



             code = Antibody Scrn) AM)                                    



[facility] [id number] 07:52:00





             Test Item    Value        Reference Range Interpretation Comments

 

             Glucose Lvl (test code = Glucose Lvl) 291          70-99           

          



[facility] [id number] 07:52:00





             Test Item    Value        Reference Range Interpretation Comments

 

             BUN (test code = BUN) 16           -                      



[facility] [id number] 07:52:00





             Test Item    Value        Reference Range Interpretation Comments

 

             Creatinine Lvl (test code = Creatinine 0.83         0.50-1.40      

           



             Lvl)                                                



[facility] [id number] 07:52:00





             Test Item    Value        Reference Range Interpretation Comments

 

             Sodium Lvl (test code = Sodium Lvl) 138          135-145           

        



[facility] [id number] 07:52:00





             Test Item    Value        Reference Range Interpretation Comments

 

             Potassium Lvl (test code = Potassium 4.7          3.5-5.1          

         



             Lvl)                                                



[facility] [id number] 07:52:00





             Test Item    Value        Reference Range Interpretation Comments

 

             Chloride Lvl (test code = Chloride Lvl) 113                  

            



[facility] [id number] 07:52:00





             Test Item    Value        Reference Range Interpretation Comments

 

             CO2 (test code = CO2) 18           24-32                     



[facility] [id number] 07:52:00





             Test Item    Value        Reference Range Interpretation Comments

 

             AGAP (test code = AGAP) 11.7         10.0-20.0                 



[facility] [employer] [id number] 07:52:00





             Test Item    Value        Reference Range Interpretation Comments

 

             Calcium Lvl (test code = Calcium Lvl) 9.4          8.5-10.5        

          



[facility] [id number] 07:52:00





             Test Item    Value        Reference Range Interpretation Comments

 

             eGFR (test code = eGFR) 113                                    



[facility]osflquCGXLNZESDL7464[facility]-43 [facility]52:00





             Test Item    Value        Reference Range Interpretation Comments

 

             WBC (test code = WBC) 5.5          3.7-10.4                  



[facility]UfcpsswZASLCHDMIB6581-15-91 07:52:00





             Test Item    Value        Reference Range Interpretation Comments

 

             RBC (test code = RBC) 5.53         4.70-6.10                 



[facility] CenterFtqaqjlJBGITXRYGO9222-69-48 07:52:00





             Test Item    Value        Reference Range Interpretation Comments

 

             Hgb (test code = Hgb) 16.3         14.0-18.0                 



[facility] CenterIenpaveCKGHTJRNKH0603-24-57 07:52:00





             Test Item    Value        Reference Range Interpretation Comments

 

             Hct (test code = Hct) 50.5         42.0-54.0                 



[facility] CenterWujztrhSLZYYLEYSU6906-67-47 07:52:00





             Test Item    Value        Reference Range Interpretation Comments

 

             MCV (test code = MCV) 91.3         80.0-94.0                 



[facility] CenterBskrdpvEBOSDLVKFE4947-68-63 07:52:00





             Test Item    Value        Reference Range Interpretation Comments

 

             MCH (test code = MCH) 29.5 pg      27.0-31.0                 



[facility] CenterIfkgjskHDZGVYNSFD7780-46-68 07:52:00





             Test Item    Value        Reference Range Interpretation Comments

 

             MCHC (test code = MCHC) 32.3         32.0-36.0                 



[facility] CenterNgiztzbAXPVPQFXBJ6769-58-86 07:52:00





             Test Item    Value        Reference Range Interpretation Comments

 

             RDW (test code = RDW) 15.4         11.5-14.5                 



[facility] CenterEfivpuuBUCXBXKWUP5757-36-07 07:52:00





             Test Item    Value        Reference Range Interpretation Comments

 

             Platelet (test code = Platelet) 210          133-450               

    



[facility] CenterLgcfbbpJAYILWOQAO8466-63-46 07:52:00





             Test Item    Value        Reference Range Interpretation Comments

 

             MPV (test code = MPV) 9.3          7.4-10.4                  



[facility] [id number] 07:52:00





             Test Item    Value        Reference Range Interpretation Comments

 

             ABO/Rh (test code = ABO/Rh) AB POS                                 



[facility] [id number] 07:52:00





             Test Item    Value        Reference Range Interpretation Comments

 

             Antibody Scrn (test Negative (3/28/22 2:52                         

  



             code = Antibody Scrn) AM)                                    



[place] 805122-03-28 07:52:00





             Test Item    Value        Reference Range Interpretation Comments

 

             Glucose Lvl (test code = Glucose Lvl) 291          70-99           

          



[place] 805122-03-28 07:52:00





             Test Item    Value        Reference Range Interpretation Comments

 

             BUN (test code = BUN) 16           7-22                      



[place] 805122-03-28 07:52:00





             Test Item    Value        Reference Range Interpretation Comments

 

             Creatinine Lvl (test code = Creatinine 0.83         0.50-1.40      

           



             Lvl)                                                



[place] 805122-03-28 07:52:00





             Test Item    Value        Reference Range Interpretation Comments

 

             Sodium Lvl (test code = Sodium Lvl) 138          135-145           

        



[place] 805122-03-28 07:52:00





             Test Item    Value        Reference Range Interpretation Comments

 

             Potassium Lvl (test code = Potassium 4.7          3.5-5.1          

         



             Lvl)                                                



[place] 805122-03-28 07:52:00





             Test Item    Value        Reference Range Interpretation Comments

 

             Chloride Lvl (test code = Chloride Lvl) 113                  

            



[place] 805122-03-28 07:52:00





             Test Item    Value        Reference Range Interpretation Comments

 

             CO2 (test code = CO2) 18           24-32                     



[place] 805122-03-28 07:52:00





             Test Item    Value        Reference Range Interpretation Comments

 

             AGAP (test code = AGAP) 11.7         10.0-20.0                 



[place] 805122-03-28 07:52:00





             Test Item    Value        Reference Range Interpretation Comments

 

             Calcium Lvl (test code = Calcium Lvl) 9.4          8.5-10.5        

          



[place] 805122-03-28 07:52:00





             Test Item    Value        Reference Range Interpretation Comments

 

             eGFR (test code = eGFR) 113                                    



[place] 726962-03-28 07:52:00





             Test Item    Value        Reference Range Interpretation Comments

 

             WBC (test code = WBC) 5.5          3.7-10.4                  



[place] 726962-03-28 07:52:00





             Test Item    Value        Reference Range Interpretation Comments

 

             RBC (test code = RBC) 5.53         4.70-6.10                 



[place] 726962-03-28 07:52:00





             Test Item    Value        Reference Range Interpretation Comments

 

             Hgb (test code = Hgb) 16.3         14.0-18.0                 



[facility] [id number] 07:52:00





             Test Item    Value        Reference Range Interpretation Comments

 

             Hct (test code = Hct) 50.5         42.0-54.0                 



[facility] CenterSjwdydqLZSVVHEPTM1360-91-33 07:52:00





             Test Item    Value        Reference Range Interpretation Comments

 

             MCV (test code = MCV) 91.3         80.0-94.0                 



[facility] CenterRripbcbZFAZLTTNRS6879-55-38 07:52:00





             Test Item    Value        Reference Range Interpretation Comments

 

             MCH (test code = MCH) 29.5 pg      27.0-31.0                 



[facility] CenterZgudtzzOGNAYXQIYT3405-05-51 07:52:00





             Test Item    Value        Reference Range Interpretation Comments

 

             MCHC (test code = MCHC) 32.3         32.0-36.0                 



[facility] MhqoadtHAASAHMYIW0225-18-29 07:52:00





             Test Item    Value        Reference Range Interpretation Comments

 

             RDW (test code = RDW) 15.4         11.5-14.5                 



[facility] CenterOllnotkCKPGKRHYME4261-24-99 07:52:00





             Test Item    Value        Reference Range Interpretation Comments

 

             Platelet (test code = Platelet) 210          133-450               

    



[facility] CenterMnnrqntYSCSOUHKQW4207-68-75 07:52:00





             Test Item    Value        Reference Range Interpretation Comments

 

             MPV (test code = MPV) 9.3          7.4-10.4                  



[facility] [id number] 07:52:00





             Test Item    Value        Reference Range Interpretation Comments

 

             ABO/Rh (test code = ABO/Rh) AB POS                                 



[facility] [id number] 07:52:00





             Test Item    Value        Reference Range Interpretation Comments

 

             Antibody Scrn (test Negative (3/28/22 2:52                         

  



             code = Antibody Scrn) AM)                                    



[facility] [employer] [id number] 07:52:00





             Test Item    Value        Reference Range Interpretation Comments

 

             Glucose Lvl (test code = Glucose Lvl) 291          70-99           

          



[facility] [employer] [id number] 07:52:00





             Test Item    Value        Reference Range Interpretation Comments

 

             BUN (test code = BUN) 16           7-                      



[facility] [employer] [id number] 07:52:00





             Test Item    Value        Reference Range Interpretation Comments

 

             Creatinine Lvl (test code = Creatinine 0.83         0.50-1.40      

           



             Lvl)                                                



[place] 805122-03-28 07:52:00





             Test Item    Value        Reference Range Interpretation Comments

 

             Sodium Lvl (test code = Sodium Lvl) 138          135-145           

        



[place] 805122-03-28 07:52:00





             Test Item    Value        Reference Range Interpretation Comments

 

             Potassium Lvl (test code = Potassium 4.7          3.5-5.1          

         



             Lvl)                                                



[place] 805122-03-28 07:52:00





             Test Item    Value        Reference Range Interpretation Comments

 

             Chloride Lvl (test code = Chloride Lvl) 113                  

            



[place] 805122-03-28 07:52:00





             Test Item    Value        Reference Range Interpretation Comments

 

             CO2 (test code = CO2) 18           24-32                     



[place] 805122-03-28 07:52:00





             Test Item    Value        Reference Range Interpretation Comments

 

             AGAP (test code = AGAP) 11.7         10.0-20.0                 



[place] 805122-03-28 07:52:00





             Test Item    Value        Reference Range Interpretation Comments

 

             Calcium Lvl (test code = Calcium Lvl) 9.4          8.5-10.5        

          



[place] 805122-03-28 07:52:00





             Test Item    Value        Reference Range Interpretation Comments

 

             eGFR (test code = eGFR) 113                                    



[place] 726962-03-28 07:52:00





             Test Item    Value        Reference Range Interpretation Comments

 

             WBC (test code = WBC) 5.5          3.7-10.4                  



[place] 726962-03-28 07:52:00





             Test Item    Value        Reference Range Interpretation Comments

 

             RBC (test code = RBC) 5.53         4.70-6.10                 



[place] [address]-03-28 07:52:00





             Test Item    Value        Reference Range Interpretation Comments

 

             Hgb (test code = Hgb) 16.3         14.0-18.0                 



[place] [address]-03-28 07:52:00





             Test Item    Value        Reference Range Interpretation Comments

 

             Hct (test code = Hct) 50.5         42.0-54.0                 



[place] [address]-03-28 07:52:00





             Test Item    Value        Reference Range Interpretation Comments

 

             MCV (test code = MCV) 91.3         80.0-94.0                 



[place] [address]-03-28 07:52:00





             Test Item    Value        Reference Range Interpretation Comments

 

             MCH (test code = MCH) 29.5 pg      27.0-31.0                 



[facility] CenterBryijsrGHGQRNFZEW3974-14-55 07:52:00





             Test Item    Value        Reference Range Interpretation Comments

 

             MCHC (test code = MCHC) 32.3         32.0-36.0                 



[facility] CenterEmiabxsRNCFXSKIXR6711-23-49 07:52:00





             Test Item    Value        Reference Range Interpretation Comments

 

             RDW (test code = RDW) 15.4         11.5-14.5                 



[facility] CenterMiqxitxZRZGADIMFX9307-13-42 07:52:00





             Test Item    Value        Reference Range Interpretation Comments

 

             Platelet (test code = Platelet) 210          133-450               

    



[facility] CenterGyvszlsWQHRQECTDR9435-40-80 07:52:00





             Test Item    Value        Reference Range Interpretation Comments

 

             MPV (test code = MPV) 9.3          7.4-10.4                  



[facility] [id number] 07:52:00





             Test Item    Value        Reference Range Interpretation Comments

 

             ABO/Rh (test code = ABO/Rh) AB POS                                 



[facility] [id number] 07:52:00





             Test Item    Value        Reference Range Interpretation Comments

 

             Antibody Scrn (test Negative (3/28/22 2:52                         

  



             code = Antibody Scrn) AM)                                    



[facility] [id number] 07:52:00





             Test Item    Value        Reference Range Interpretation Comments

 

             Glucose Lvl (test code = Glucose Lvl) 291          70-99           

          



[facility] Center[facility] [id number] 07:52:00





             Test Item    Value        Reference Range Interpretation Comments

 

             BUN (test code = BUN) 16           7-22                      



[facility] [id number] 07:52:00





             Test Item    Value        Reference Range Interpretation Comments

 

             Creatinine Lvl (test code = Creatinine 0.83         0.50-1.40      

           



             Lvl)                                                



[facility] [id number] 07:52:00





             Test Item    Value        Reference Range Interpretation Comments

 

             Sodium Lvl (test code = Sodium Lvl) 138          135-145           

        



[facility] [id number]





             Test Item    Value        Reference Range Interpretation Comments

 

             Potassium Lvl (test code = Potassium 4.7          3.5-5.1          

         



             Lvl)                                                



[facility] [id number] 07:52:00





             Test Item    Value        Reference Range Interpretation Comments

 

             Chloride Lvl (test code = Chloride Lvl) 113                  

            



[place] 805122-03-28 07:52:00





             Test Item    Value        Reference Range Interpretation Comments

 

             CO2 (test code = CO2) 18           24-32                     



[place] 805122-03-28 07:52:00





             Test Item    Value        Reference Range Interpretation Comments

 

             AGAP (test code = AGAP) 11.7         10.0-20.0                 



[place] 805122-03-28 07:52:00





             Test Item    Value        Reference Range Interpretation Comments

 

             Calcium Lvl (test code = Calcium Lvl) 9.4          8.5-10.5        

          



[place] 805122-03-28 07:52:00





             Test Item    Value        Reference Range Interpretation Comments

 

             eGFR (test code = eGFR) 113                                    



[place] 726962-03-28 07:52:00





             Test Item    Value        Reference Range Interpretation Comments

 

             WBC (test code = WBC) 5.5          3.7-10.4                  



[place] 726962-03-28 07:52:00





             Test Item    Value        Reference Range Interpretation Comments

 

             RBC (test code = RBC) 5.53         4.70-6.10                 



[place] 726962-03-28 07:52:00





             Test Item    Value        Reference Range Interpretation Comments

 

             Hgb (test code = Hgb) 16.3         14.0-18.0                 



[place] 726962-03-28 07:52:00





             Test Item    Value        Reference Range Interpretation Comments

 

             Hct (test code = Hct) 50.5         42.0-54.0                 



[place] 726962-03-28 07:52:00





             Test Item    Value        Reference Range Interpretation Comments

 

             MCV (test code = MCV) 91.3         80.0-94.0                 



[place] 726962-03-28 07:52:00





             Test Item    Value        Reference Range Interpretation Comments

 

             MCH (test code = MCH) 29.5 pg      27.0-31.0                 



[place] 726962-03-28 07:52:00





             Test Item    Value        Reference Range Interpretation Comments

 

             MCHC (test code = MCHC) 32.3         32.0-36.0                 



[place] 726962-03-28 07:52:00





             Test Item    Value        Reference Range Interpretation Comments

 

             RDW (test code = RDW) 15.4         11.5-14.5                 



[place] 726962-03-28 07:52:00





             Test Item    Value        Reference Range Interpretation Comments

 

             Platelet (test code = Platelet) 210          133-450               

    



[place] 726962-03-28 07:52:00





             Test Item    Value        Reference Range Interpretation Comments

 

             MPV (test code = MPV) 9.3          7.4-10.4                  



[place] 805122-03-27 10:12:00





             Test Item    Value        Reference Range Interpretation Comments

 

             Glucose Lvl (test code = Glucose Lvl) 115          70-99           

          



[place] 805122-03-27 10:12:00





             Test Item    Value        Reference Range Interpretation Comments

 

             BUN (test code = BUN) 18           7-22                      



[place] 805122-03-27 10:12:00





             Test Item    Value        Reference Range Interpretation Comments

 

             Creatinine Lvl (test code = Creatinine 0.78         0.50-1.40      

           



             Lvl)                                                



[place] 805122-03-27 10:12:00





             Test Item    Value        Reference Range Interpretation Comments

 

             Sodium Lvl (test code = Sodium Lvl) 138          135-145           

        



[place] 805122-03-27 10:12:00





             Test Item    Value        Reference Range Interpretation Comments

 

             Potassium Lvl (test code = Potassium 4.6          3.5-5.1          

         



             Lvl)                                                



[place] 805122-03-27 10:12:00





             Test Item    Value        Reference Range Interpretation Comments

 

             Chloride Lvl (test code = Chloride Lvl) 111                  

            



[place] 805122-03-27 10:12:00





             Test Item    Value        Reference Range Interpretation Comments

 

             CO2 (test code = CO2) 21           24-32                     



[place] 805122-03-27 10:12:00





             Test Item    Value        Reference Range Interpretation Comments

 

             AGAP (test code = AGAP) 10.6         10.0-20.0                 



[place] 805122-03-27 10:12:00





             Test Item    Value        Reference Range Interpretation Comments

 

             Calcium Lvl (test code = Calcium Lvl) 8.6          8.5-10.5        

          



[place] 805122-03-27 10:12:00





             Test Item    Value        Reference Range Interpretation Comments

 

             eGFR (test code = eGFR) 116                                    



[place] 726962-03-27 10:12:00





             Test Item    Value        Reference Range Interpretation Comments

 

             WBC (test code = WBC) 8.2          3.7-10.4                  



[place] 726962-03-27 10:12:00





             Test Item    Value        Reference Range Interpretation Comments

 

             RBC (test code = RBC) 5.14         4.70-6.10                 



[place] 726962-03-27 10:12:00





             Test Item    Value        Reference Range Interpretation Comments

 

             Hgb (test code = Hgb) 15.5         14.0-18.0                 



[place] [address]-03-27 10:12:00





             Test Item    Value        Reference Range Interpretation Comments

 

             Hct (test code = Hct) 46.0         42.0-54.0                 



[place] 726962-03-27 10:12:00





             Test Item    Value        Reference Range Interpretation Comments

 

             MCV (test code = MCV) 89.5         80.0-94.0                 



[place] [address]-03-27 10:12:00





             Test Item    Value        Reference Range Interpretation Comments

 

             MCH (test code = MCH) 30.2 pg      27.0-31.0                 



[place] 726962-03-27 10:12:00





             Test Item    Value        Reference Range Interpretation Comments

 

             MCHC (test code = MCHC) 33.8         32.0-36.0                 



[place] 726962-03-27 10:12:00





             Test Item    Value        Reference Range Interpretation Comments

 

             RDW (test code = RDW) 15.3         11.5-14.5                 



[id number]00





             Test Item    Value        Reference Range Interpretation Comments

 

             Platelet (test code = Platelet) 358          133-450               

    



[facility] [id number] 10:12:00





             Test Item    Value        Reference Range Interpretation Comments

 

             MPV (test code = MPV) 8.3          7.4-10.4                  



[facility]2022-03-27 10:12:00





             Test Item    Value        Reference Range Interpretation Comments

 

             Glucose Lvl (test code = Glucose Lvl) 115          70-99           

          



[facility]2022-03-27 10:12:00





             Test Item    Value        Reference Range Interpretation Comments

 

             BUN (test code = BUN) 18           7-22                      



[facility]2022-03-27 10:12:00





             Test Item    Value        Reference Range Interpretation Comments

 

             Creatinine Lvl (test code = Creatinine 0.78         0.50-1.40      

           



             Lvl)                                                



[facility]2022-03-27 10:12:00





             Test Item    Value        Reference Range Interpretation Comments

 

             Sodium Lvl (test code = Sodium Lvl) 138          135-145           

        



[place] 805122-03-27 10:12:00





             Test Item    Value        Reference Range Interpretation Comments

 

             Potassium Lvl (test code = Potassium 4.6          3.5-5.1          

         



             Lvl)                                                



[place] 805122-03-27 10:12:00





             Test Item    Value        Reference Range Interpretation Comments

 

             Chloride Lvl (test code = Chloride Lvl) 111                  

            



[place] 805122-03-27 10:12:00





             Test Item    Value        Reference Range Interpretation Comments

 

             CO2 (test code = CO2) 21           24-32                     



[place] 805122-03-27 10:12:00





             Test Item    Value        Reference Range Interpretation Comments

 

             AGAP (test code = AGAP) 10.6         10.0-20.0                 



[address]03-27 10:12:00





             Test Item    Value        Reference Range Interpretation Comments

 

             Calcium Lvl (test code = Calcium Lvl) 8.6          8.5-10.5        

          



[place] 805122-03-27 10:12:00





             Test Item    Value        Reference Range Interpretation Comments

 

             eGFR (test code = eGFR) 116                                    



[place] 726962-03-27 10:12:00





             Test Item    Value        Reference Range Interpretation Comments

 

             WBC (test code = WBC) 8.2          3.7-10.4                  



[place] [address]-03-27 10:12:00





             Test Item    Value        Reference Range Interpretation Comments

 

             RBC (test code = RBC) 5.14         4.70-6.10                 



[place] [address]-03-27 10:12:00





             Test Item    Value        Reference Range Interpretation Comments

 

             Hgb (test code = Hgb) 15.5         14.0-18.0                 



[place] [address]-03-27 10:12:00





             Test Item    Value        Reference Range Interpretation Comments

 

             Hct (test code = Hct) 46.0         42.0-54.0                 



[address]03-27 10:12:00





             Test Item    Value        Reference Range Interpretation Comments

 

             MCV (test code = MCV) 89.5         80.0-94.0                 



[place] [address]-03-27 10:12:00





             Test Item    Value        Reference Range Interpretation Comments

 

             MCH (test code = MCH) 30.2 pg      27.0-31.0                 



[place] [address]-03-27 10:12:00





             Test Item    Value        Reference Range Interpretation Comments

 

             MCHC (test code = MCHC) 33.8         32.0-36.0                 



[place] [address]-03-27 10:12:00





             Test Item    Value        Reference Range Interpretation Comments

 

             RDW (test code = RDW) 15.3         11.5-14.5                 



[place] 726962-03-27 10:12:00





             Test Item    Value        Reference Range Interpretation Comments

 

             Platelet (test code = Platelet) 358          133-450               

    



[place] 726962-03-27 10:12:00





             Test Item    Value        Reference Range Interpretation Comments

 

             MPV (test code = MPV) 8.3          7.4-10.4                  



[place] 805122-03-27 10:12:00





             Test Item    Value        Reference Range Interpretation Comments

 

             Glucose Lvl (test code = Glucose Lvl) 115          70-99           

          



[place] 805122-03-27 10:12:00





             Test Item    Value        Reference Range Interpretation Comments

 

             BUN (test code = BUN) 18           7-22                      



[place] 805122-03-27 10:12:00





             Test Item    Value        Reference Range Interpretation Comments

 

             Creatinine Lvl (test code = Creatinine 0.78         0.50-1.40      

           



             Lvl)                                                



[facility]2022-03-27 10:12:00





             Test Item    Value        Reference Range Interpretation Comments

 

             Sodium Lvl (test code = Sodium Lvl) 138          135-145           

        



[place] 805122-03-27 10:12:00





             Test Item    Value        Reference Range Interpretation Comments

 

             Potassium Lvl (test code = Potassium 4.6          3.5-5.1          

         



             Lvl)                                                



[place] 805122-03-27 10:12:00





             Test Item    Value        Reference Range Interpretation Comments

 

             Chloride Lvl (test code = Chloride Lvl) 111                  

            



[place] 805122-03-27 10:12:00





             Test Item    Value        Reference Range Interpretation Comments

 

             CO2 (test code = CO2) 21           24-32                     



[place] 805122-03-27 10:12:00





             Test Item    Value        Reference Range Interpretation Comments

 

             AGAP (test code = AGAP) 10.6         10.0-20.0                 



[place] 805122-03-27 10:12:00





             Test Item    Value        Reference Range Interpretation Comments

 

             Calcium Lvl (test code = Calcium Lvl) 8.6          8.5-10.5        

          



[place] 805122-03-27 10:12:00





             Test Item    Value        Reference Range Interpretation Comments

 

             eGFR (test code = eGFR) 116                                    



[place] 726962-03-27 10:12:00





             Test Item    Value        Reference Range Interpretation Comments

 

             WBC (test code = WBC) 8.2          3.7-10.4                  



[place] [address]-03-27 10:12:00





             Test Item    Value        Reference Range Interpretation Comments

 

             RBC (test code = RBC) 5.14         4.70-6.10                 



[place] 726962-03-27 10:12:00





             Test Item    Value        Reference Range Interpretation Comments

 

             Hgb (test code = Hgb) 15.5         14.0-18.0                 



[place] [address]-03-27 10:12:00





             Test Item    Value        Reference Range Interpretation Comments

 

             Hct (test code = Hct) 46.0         42.0-54.0                 



[place] [address]-03-27 10:12:00





             Test Item    Value        Reference Range Interpretation Comments

 

             MCV (test code = MCV) 89.5         80.0-94.0                 



[place] [address]-03-27 10:12:00





             Test Item    Value        Reference Range Interpretation Comments

 

             MCH (test code = MCH) 30.2 pg      27.0-31.0                 



[place] [address]-03-27 10:12:00





             Test Item    Value        Reference Range Interpretation Comments

 

             MCHC (test code = MCHC) 33.8         32.0-36.0                 



[place] [address]-03-27 10:12:00





             Test Item    Value        Reference Range Interpretation Comments

 

             RDW (test code = RDW) 15.3         11.5-14.5                 



[place] 726962-03-27 10:12:00





             Test Item    Value        Reference Range Interpretation Comments

 

             Platelet (test code = Platelet) 358          133-450               

    



[place] [address]-03-27 10:12:00





             Test Item    Value        Reference Range Interpretation Comments

 

             MPV (test code = MPV) 8.3          7.4-10.4                  



[facility]2022-03-27 10:12:00





             Test Item    Value        Reference Range Interpretation Comments

 

             Glucose Lvl (test code = Glucose Lvl) 115          70-99           

          



[facility]2022-03-27 10:12:00





             Test Item    Value        Reference Range Interpretation Comments

 

             BUN (test code = BUN) 18           7-22                      



[place] 805122-03-27 10:12:00





             Test Item    Value        Reference Range Interpretation Comments

 

             Creatinine Lvl (test code = Creatinine 0.78         0.50-1.40      

           



             Lvl)                                                



[place] 805122-03-27 10:12:00





             Test Item    Value        Reference Range Interpretation Comments

 

             Sodium Lvl (test code = Sodium Lvl) 138          135-145           

        



[place] 805122-03-27 10:12:00





             Test Item    Value        Reference Range Interpretation Comments

 

             Potassium Lvl (test code = Potassium 4.6          3.5-5.1          

         



             Lvl)                                                



[place] 805122-03-27 10:12:00





             Test Item    Value        Reference Range Interpretation Comments

 

             Chloride Lvl (test code = Chloride Lvl) 111                  

            



[place] 805122-03-27 10:12:00





             Test Item    Value        Reference Range Interpretation Comments

 

             CO2 (test code = CO2) 21           24-32                     



[place] 805122-03-27 10:12:00





             Test Item    Value        Reference Range Interpretation Comments

 

             AGAP (test code = AGAP) 10.6         10.0-20.0                 



[place] 805122-03-27 10:12:00





             Test Item    Value        Reference Range Interpretation Comments

 

             Calcium Lvl (test code = Calcium Lvl) 8.6          8.5-10.5        

          



[place] 805122-03-27 10:12:00





             Test Item    Value        Reference Range Interpretation Comments

 

             eGFR (test code = eGFR) 116                                    



[place] 726962-03-27 10:12:00





             Test Item    Value        Reference Range Interpretation Comments

 

             WBC (test code = WBC) 8.2          3.7-10.4                  



[place] [address]-03-27 10:12:00





             Test Item    Value        Reference Range Interpretation Comments

 

             RBC (test code = RBC) 5.14         4.70-6.10                 



[place] [address]-03-27 10:12:00





             Test Item    Value        Reference Range Interpretation Comments

 

             Hgb (test code = Hgb) 15.5         14.0-18.0                 



[place] [address]-03-27 10:12:00





             Test Item    Value        Reference Range Interpretation Comments

 

             Hct (test code = Hct) 46.0         42.0-54.0                 



[place] [address]-03-27 10:12:00





             Test Item    Value        Reference Range Interpretation Comments

 

             MCV (test code = MCV) 89.5         80.0-94.0                 



[place] [address]-03-27 10:12:00





             Test Item    Value        Reference Range Interpretation Comments

 

             MCH (test code = MCH) 30.2 pg      27.0-31.0                 



[place] 726962-03-27 10:12:00





             Test Item    Value        Reference Range Interpretation Comments

 

             MCHC (test code = MCHC) 33.8         32.0-36.0                 



[place] 726962-03-27 10:12:00





             Test Item    Value        Reference Range Interpretation Comments

 

             RDW (test code = RDW) 15.3         11.5-14.5                 



[place] [address]-03-27 10:12:00





             Test Item    Value        Reference Range Interpretation Comments

 

             Platelet (test code = Platelet) 358          133-450               

    



[place] [address]-03-27 10:12:00





             Test Item    Value        Reference Range Interpretation Comments

 

             MPV (test code = MPV) 8.3          7.4-10.4                  



[place] 805122-03-27 06:53:00





             Test Item    Value        Reference Range Interpretation Comments

 

             Glucose Lvl (test code = Glucose Lvl) 167          70-99           

          



[place] 805122-03-27 06:53:00





             Test Item    Value        Reference Range Interpretation Comments

 

             BUN (test code = BUN) 16           7-22                      



[place] 805122-03-27 06:53:00





             Test Item    Value        Reference Range Interpretation Comments

 

             Creatinine Lvl (test code = Creatinine 0.99         0.50-1.40      

           



             Lvl)                                                



[place] 805122-03-27 06:53:00





             Test Item    Value        Reference Range Interpretation Comments

 

             Sodium Lvl (test code = Sodium Lvl) 141          135-145           

        



[place] 805122-03-27 06:53:00





             Test Item    Value        Reference Range Interpretation Comments

 

             Potassium Lvl (test code = Potassium 4.5          3.5-5.1          

         



             Lvl)                                                



[place] 805122-03-27 06:53:00





             Test Item    Value        Reference Range Interpretation Comments

 

             Chloride Lvl (test code = Chloride Lvl) 111                  

            



[place] 805122-03-27 06:53:00





             Test Item    Value        Reference Range Interpretation Comments

 

             CO2 (test code = CO2) 22           24-32                     



[place] 805122-03-27 06:53:00





             Test Item    Value        Reference Range Interpretation Comments

 

             AGAP (test code = AGAP) 12.5         10.0-20.0                 



[place] 805122-03-27 06:53:00





             Test Item    Value        Reference Range Interpretation Comments

 

             Calcium Lvl (test code = Calcium Lvl) 8.6          8.5-10.5        

          



[place] 805122-03-27 06:53:00





             Test Item    Value        Reference Range Interpretation Comments

 

             eGFR (test code = eGFR) 98                                     



[place] 805122-03-27 06:53:00





             Test Item    Value        Reference Range Interpretation Comments

 

             Glucose Lvl (test code = Glucose Lvl) 167          70-99           

          



[place] 805122-03-27 06:53:00





             Test Item    Value        Reference Range Interpretation Comments

 

             BUN (test code = BUN) 16           7-22                      



[place] 805122-03-27 06:53:00





             Test Item    Value        Reference Range Interpretation Comments

 

             Creatinine Lvl (test code = Creatinine 0.99         0.50-1.40      

           



             Lvl)                                                



[place] 805122-03-27 06:53:00





             Test Item    Value        Reference Range Interpretation Comments

 

             Sodium Lvl (test code = Sodium Lvl) 141          135-145           

        



[place] 805122-03-27 06:53:00





             Test Item    Value        Reference Range Interpretation Comments

 

             Potassium Lvl (test code = Potassium 4.5          3.5-5.1          

         



             Lvl)                                                



[facility]2022-03-27 06:53:00





             Test Item    Value        Reference Range Interpretation Comments

 

             Chloride Lvl (test code = Chloride Lvl) 111                  

            



[place] 805122-03-27 06:53:00





             Test Item    Value        Reference Range Interpretation Comments

 

             CO2 (test code = CO2) 22           24-32                     



[place] 805122-03-27 06:53:00





             Test Item    Value        Reference Range Interpretation Comments

 

             AGAP (test code = AGAP) 12.5         10.0-20.0                 



[place] 805122-03-27 06:53:00





             Test Item    Value        Reference Range Interpretation Comments

 

             Calcium Lvl (test code = Calcium Lvl) 8.6          8.5-10.5        

          



[place] 805122-03-27 06:53:00





             Test Item    Value        Reference Range Interpretation Comments

 

             eGFR (test code = eGFR) 98                                     



[place] 805122-03-27 06:53:00





             Test Item    Value        Reference Range Interpretation Comments

 

             Glucose Lvl (test code = Glucose Lvl) 167          70-99           

          



[place] 805122-03-27 06:53:00





             Test Item    Value        Reference Range Interpretation Comments

 

             BUN (test code = BUN) 16                                 



[place] 805122-03-27 06:53:00





             Test Item    Value        Reference Range Interpretation Comments

 

             Creatinine Lvl (test code = Creatinine 0.99         0.50-1.40      

           



             Lvl)                                                



[facility]2022-03-27 06:53:00





             Test Item    Value        Reference Range Interpretation Comments

 

             Sodium Lvl (test code = Sodium Lvl) 141          135-145           

        



[place] 805122-03-27 06:53:00





             Test Item    Value        Reference Range Interpretation Comments

 

             Potassium Lvl (test code = Potassium 4.5          3.5-5.1          

         



             Lvl)                                                



[place] 805122-03-27 06:53:00





             Test Item    Value        Reference Range Interpretation Comments

 

             Chloride Lvl (test code = Chloride Lvl) 111                  

            



[facility]2022-03-27 06:53:00





             Test Item    Value        Reference Range Interpretation Comments

 

             CO2 (test code = CO2)            24-32                     



[facility]2022-03-27 06:53:00





             Test Item    Value        Reference Range Interpretation Comments

 

             AGAP (test code = AGAP) 12.5         10.0-20.0                 



[facility]2022-03-27 06:53:00





             Test Item    Value        Reference Range Interpretation Comments

 

             Calcium Lvl (test code = Calcium Lvl) 8.6          8.5-10.5        

          



[facility]2022-03-27 06:53:00





             Test Item    Value        Reference Range Interpretation Comments

 

             eGFR (test code = eGFR) 98                                     



[place] 805122-03-27 06:53:00





             Test Item    Value        Reference Range Interpretation Comments

 

             Glucose Lvl (test code = Glucose Lvl) 167          70-99           

          



[facility]2022-03-27 06:53:00





             Test Item    Value        Reference Range Interpretation Comments

 

             BUN (test code = BUN) 16                                 



[place] 805122-03-27 06:53:00





             Test Item    Value        Reference Range Interpretation Comments

 

             Creatinine Lvl (test code = Creatinine 0.99         0.50-1.40      

           



             Lvl)                                                



[place] 805122-03-27 06:53:00





             Test Item    Value        Reference Range Interpretation Comments

 

             Sodium Lvl (test code = Sodium Lvl) 141          135-145           

        



[facility] [id number] 06:53:00





             Test Item    Value        Reference Range Interpretation Comments

 

             Potassium Lvl (test code = Potassium 4.5          3.5-5.1          

         



             Lvl)                                                



[facility] [id number] 06:53:00





             Test Item    Value        Reference Range Interpretation Comments

 

             Chloride Lvl (test code = Chloride Lvl) 111                  

            



[facility] [id number] 06:53:00





             Test Item    Value        Reference Range Interpretation Comments

 

             CO2 (test code = CO2) 22           24-32                     



[facility] [id number] 06:53:00





             Test Item    Value        Reference Range Interpretation Comments

 

             AGAP (test code = AGAP) 12.5         10.0-20.0                 



[facility] [id number] 06:53:00





             Test Item    Value        Reference Range Interpretation Comments

 

             Calcium Lvl (test code = Calcium Lvl) 8.6          8.5-10.5        

          



[facility] [id number] 06:53:00





             Test Item    Value        Reference Range Interpretation Comments

 

             eGFR (test code = eGFR) 98                                     



[facility]SUSC:PT:ISOLATE:ORDQN:[id number] 22:59:00





             Test Item    Value        Reference Range Interpretation Comments

 

             Culture: Urine (test >100,000 CFU/mL Proteus                       

    



             code = Culture: mirabilis >100,000 CFU/mL                          

 



             Urine)       Providencia stuartii                           



[facility]USC:PT:ISOLATE:ORDQN:[id number] 22:59:00





             Test Item    Value        Reference Range Interpretation Comments

 

             Providencia stuartii Providencia stuartii                          

 



             (test code = Providencia                                        



             stuartii)                                           



[facility]:PT:ISOLATE:ORDQN:[id number] 22:59:00





             Test Item    Value        Reference Range Interpretation Comments

 

             Proteus mirabilis (test Proteus mirabilis                          

 



             code = Proteus mirabilis)                                        



[facility]: [id number] 22:59:00





             Test Item    Value        Reference Range Interpretation Comments

 

             Culture: Urine (test Holding For Better                           



             code = Culture: Urine) Growth                                 



[facility]:ISOLATE:ORDQN:[id number] 22:59:00





             Test Item    Value        Reference Range Interpretation Comments

 

             Culture: Urine (test >100,000 CFU/mL Proteus                       

    



             code = Culture: mirabilis >100,000 CFU/mL                          

 



             Urine)       Providencia stuartii                           



[facility]:PT:ISOLATE:ORDQN:[id number] 22:59:00





             Test Item    Value        Reference Range Interpretation Comments

 

             Providencia stuartii Providencia stuartii                          

 



             (test code = Providencia                                        



             stuartii)                                           



[facility]PT:ISOLATE:ORDQN:[id number] 22:59:00





             Test Item    Value        Reference Range Interpretation Comments

 

             Proteus mirabilis (test Proteus mirabilis                          

 



             code = Proteus mirabilis)                                        



[facility] [id number] 22:59:00





             Test Item    Value        Reference Range Interpretation Comments

 

             Culture: Urine (test Holding For Better                           



             code = Culture: Urine) Growth                                 



[facility]:PT:ISOLATE:ORDQN:[id number] 22:59:00





             Test Item    Value        Reference Range Interpretation Comments

 

             Culture: Urine (test >100,000 CFU/mL Proteus                       

    



             code = Culture: mirabilis >100,000 CFU/mL                          

 



             Urine)       Providencia stuartii                           



[facility]:PT:ISOLATE:ORDQN:[id number] 22:59:00





             Test Item    Value        Reference Range Interpretation Comments

 

             Providencia stuartii Providencia stuartii                          

 



             (test code = Providencia                                        



             stuartii)                                           



[facility]:PT:ISOLATE:ORDQN:[id number] 22:59:00





             Test Item    Value        Reference Range Interpretation Comments

 

             Proteus mirabilis (test Proteus mirabilis                          

 



             code = Proteus mirabilis)                                        



[facility]: [id number] 22:59:00





             Test Item    Value        Reference Range Interpretation Comments

 

             Culture: Urine (test Holding For Better                           



             code = Culture: Urine) Growth                                 



[facility]:PT:ISOLATE:ORDQN:[id number] 22:59:00





             Test Item    Value        Reference Range Interpretation Comments

 

             Culture: Urine (test >100,000 CFU/mL Proteus                       

    



             code = Culture: mirabilis >100,000 CFU/mL                          

 



             Urine)       Providencia stuartii                           



[facility]:PT:ISOLATE:ORDQN:[id number] 22:59:00





             Test Item    Value        Reference Range Interpretation Comments

 

             Providencia stuartii Providencia stuartii                          

 



             (test code = Providencia                                        



             stuartii)                                           



[facility]USC:PT:ISOLATE:ORDQN:[id number] 22:59:00





             Test Item    Value        Reference Range Interpretation Comments

 

             Proteus mirabilis (test Proteus mirabilis                          

 



             code = Proteus mirabilis)                                        



[facility] [id number] 22:59:00





             Test Item    Value        Reference Range Interpretation Comments

 

             Culture: Urine (test Holding For Better                           



             code = Culture: Urine) Growth                                 



[facility] [id number] 21:03:00





             Test Item    Value        Reference Range Interpretation Comments

 

             Glucose Lvl (test code = Glucose Lvl) 126          70-99           

          



[facility] [id number] 21:03:00





             Test Item    Value        Reference Range Interpretation Comments

 

             BUN (test code = BUN) 16           7-22                      



[facility] [id number] 21:03:00





             Test Item    Value        Reference Range Interpretation Comments

 

             Creatinine Lvl (test code = Creatinine 0.76         0.50-1.40      

           



             Lvl)                                                



[facility] [id number] 21:03:00





             Test Item    Value        Reference Range Interpretation Comments

 

             Sodium Lvl (test code = Sodium Lvl) 140          135-145           

        



[facility] [id number] 21:03:00





             Test Item    Value        Reference Range Interpretation Comments

 

             Potassium Lvl (test code = Potassium 3.3          3.5-5.1          

         



             Lvl)                                                



[facility] [id number] 21:03:00





             Test Item    Value        Reference Range Interpretation Comments

 

             Chloride Lvl (test code = Chloride Lvl) 111                  

            



[facility] [id number] 21:03:00





             Test Item    Value        Reference Range Interpretation Comments

 

             CO2 (test code = CO2) 22           24-32                     



[place] 805122-03-26 21:03:00





             Test Item    Value        Reference Range Interpretation Comments

 

             Calcium Lvl (test code = Calcium Lvl) 8.5          8.5-10.5        

          



[place] 805122-03-26 21:03:00





             Test Item    Value        Reference Range Interpretation Comments

 

             AGAP (test code = AGAP) 10.3         10.0-20.0                 



[place] 805122-03-26 21:03:00





             Test Item    Value        Reference Range Interpretation Comments

 

             eGFR (test code = eGFR) 117                                    



[place] 805122-03-26 21:03:00





             Test Item    Value        Reference Range Interpretation Comments

 

             Lactic Acid Lvl (test code = Lactic 1.1          0.5-2.2           

        



             Acid Lvl)                                           



[place] 726962-03-26 21:03:00





             Test Item    Value        Reference Range Interpretation Comments

 

             Segs (test code = Segs) 68.9         45.0-75.0                 



[place] 726962-03-26 21:03:00





             Test Item    Value        Reference Range Interpretation Comments

 

             Lymphocytes (test code = Lymphocytes) 20.4         20.0-40.0       

          



[place] 726962-03-26 21:03:00





             Test Item    Value        Reference Range Interpretation Comments

 

             Monocytes (test code = Monocytes) 8.2          2.0-12.0            

      



[place] [address]-03-26 21:03:00





             Test Item    Value        Reference Range Interpretation Comments

 

             Eosinophils (test code = 2.2          See_Comment                [A

utomated message] The



             Eosinophils)                                        system which ge

nerated



                                                                 this result tra

nsmitted



                                                                 reference range

: <=4.0.



                                                                 The reference r

zhen was



                                                                 not used to int

erpret



                                                                 this result as



                                                                 normal/abnormal

.



[place] 726962-03-26 21:03:00





             Test Item    Value        Reference Range Interpretation Comments

 

             Basophils (test code = 0.3          See_Comment                [Aut

omated message] The



             Basophils)                                          system which ge

nerated



                                                                 this result tra

nsmitted



                                                                 reference range

: <=1.0.



                                                                 The reference r

zhen was



                                                                 not used to int

erpret



                                                                 this result as



                                                                 normal/abnormal

.



[place] 726962-03-26 21:03:00





             Test Item    Value        Reference Range Interpretation Comments

 

             Neutrophils # (test code = Neutrophils 5.5          1.5-8.1        

           



             #)                                                  



[place] 726962-03-26 21:03:00





             Test Item    Value        Reference Range Interpretation Comments

 

             Lymphocytes # (test code = Lymphocytes 1.6          1.0-5.5        

           



             #)                                                  



[place] 726962-03-26 21:03:00





             Test Item    Value        Reference Range Interpretation Comments

 

             Monocytes # (test code 0.7          See_Comment                [Aut

omated message] The



             = Monocytes #)                                        system which 

generated



                                                                 this result tra

nsmitted



                                                                 reference range

: <=0.8.



                                                                 The reference r

zhen was



                                                                 not used to int

erpret



                                                                 this result as



                                                                 normal/abnormal

.



[place] 726962-03-26 21:03:00





             Test Item    Value        Reference Range Interpretation Comments

 

             Eosinophils # (test code 0.2          See_Comment                [A

utomated message] The



             = Eosinophils #)                                        system whic

h generated



                                                                 this result tra

nsmitted



                                                                 reference range

: <=0.5.



                                                                 The reference r

zhen was



                                                                 not used to int

erpret



                                                                 this result as



                                                                 normal/abnormal

.



[id number]





             Test Item    Value        Reference Range Interpretation Comments

 

             WBC (test code = WBC) 8.0          3.7-10.4                  



[place] 726962-03-26 21:03:00





             Test Item    Value        Reference Range Interpretation Comments

 

             RBC (test code = RBC) 5.37         4.70-6.10                 



[place] [address]-03-26 21:03:00





             Test Item    Value        Reference Range Interpretation Comments

 

             Hgb (test code = Hgb) 15.9         14.0-18.0                 



[place] 726962-03-26 21:03:00





             Test Item    Value        Reference Range Interpretation Comments

 

             Hct (test code = Hct) 47.3         42.0-54.0                 



[place] [address]-03-26 21:03:00





             Test Item    Value        Reference Range Interpretation Comments

 

             MCV (test code = MCV) 88.1         80.0-94.0                 



[place] 726962-03-26 21:03:00





             Test Item    Value        Reference Range Interpretation Comments

 

             MCH (test code = MCH) 29.6 pg      27.0-31.0                 



[place] 726962-03-26 21:03:00





             Test Item    Value        Reference Range Interpretation Comments

 

             MCHC (test code = MCHC) 33.6         32.0-36.0                 



[place] 726962-03-26 21:03:00





             Test Item    Value        Reference Range Interpretation Comments

 

             RDW (test code = RDW) 15.3         11.5-14.5                 



[facility] [id number] 21:03:00





             Test Item    Value        Reference Range Interpretation Comments

 

             Platelet (test code = Platelet) 370          133-450               

    



[facility] [id number] 21:03:00





             Test Item    Value        Reference Range Interpretation Comments

 

             MPV (test code = MPV) 8.1          7.4-10.4                  



[facility] AND [id number] 21:03:00





             Test Item    Value        Reference Range Interpretation Comments

 

             UA Comment 1 (test Suboptimal specimen                           



             code = UA Comment 1) received. Results may be                      

     



                          inaccurate due to the                           



                          age of the specimen.                           



                          Interpret results with                           



                          caution.                               



[facility] AND [id number] 21:03:00





             Test Item    Value        Reference Range Interpretation Comments

 

             UA Color (test code = Yellow *NA*(3/26/22                          

 



             UA Color)    4:03 PM)                               



[facility] AND [id number] 21:03:00





             Test Item    Value        Reference Range Interpretation Comments

 

             UA Turbidity (test code Slight *ABN*(3/26/22                       

    



             = UA Turbidity) 4:03 PM)                               



[facility] AND [id number] 21:03:00





             Test Item    Value        Reference Range Interpretation Comments

 

             UA Spec Grav (test code = UA Spec 1.015 1                          

      



             Grav)                                               



[facility] AND [id number] 21:03:00





             Test Item    Value        Reference Range Interpretation Comments

 

             UA pH (test code = UA pH) 5.0 1        5.0-8.0                   



[facility] AND [id number] 21:03:00





             Test Item    Value        Reference Range Interpretation Comments

 

             UA Protein (test code = UA Negative mg/dL                          

 



             Protein)                                            



[facility] AND [id number] 21:03:00





             Test Item    Value        Reference Range Interpretation Comments

 

             UA Glucose (test code = UA Negative mg/dL                          

 



             Glucose)                                            



[facility] AND [id number] 21:03:00





             Test Item    Value        Reference Range Interpretation Comments

 

             UA Ketones (test code = UA Negative mg/dL                          

 



             Ketones)                                            



[facility] AND [id number] 21:03:00





             Test Item    Value        Reference Range Interpretation Comments

 

             UA Bili (test code = Negative *NA*(3/26/22                         

  



             UA Bili)     4:03 PM)                               



[id number]





             Test Item    Value        Reference Range Interpretation Comments

 

             UA Blood (test code = Negative (3/26/22 4:03                       

    



             UA Blood)    PM)                                    



[facility] [id number] 21:03:00





             Test Item    Value        Reference Range Interpretation Comments

 

             UA Urobilinogen (test code = UA no gt        0.1-1.0               

    



             Urobilinogen)                                        



[facility] [id number] 21:03:00





             Test Item    Value        Reference Range Interpretation Comments

 

             UA Nitrite (test code Negative (3/26/22 4:03                       

    



             = UA Nitrite) PM)                                    



[facility] [id number] 21:03:00





             Test Item    Value        Reference Range Interpretation Comments

 

             UA Leuk Est (test code Large *ABN*(3/26/22                         

  



             = UA Leuk Est) 4:03 PM)                               



[facility] [id number] 21:03:00





             Test Item    Value        Reference Range Interpretation Comments

 

             UA WBC (test code = 64           See_Comment                [Automa

huma message] The



             UA WBC)                                             system which ge

nerated this



                                                                 result transmit

huma



                                                                 reference range

: <=5. The



                                                                 reference range

 was not



                                                                 used to interpr

et this



                                                                 result as zayda

l/abnormal.



[facility] [id number] [facility]:03:[facility]





             Test Item    Value        Reference Range Interpretation Comments

 

             UA RBC (test code = 2            See_Comment                [Automa

huma message] The



             UA RBC)                                             system which ge

nerated this



                                                                 result transmit

huma



                                                                 reference range

: <=2. The



                                                                 reference range

 was not



                                                                 used to interpr

et this



                                                                 result as zayda

l/abnormal.



[facility] [id number] 21:03:00





             Test Item    Value        Reference Range Interpretation Comments

 

             UA Mucus (test code = UA Mucus) Few /LPF                           

    



[facility] [id number] 21:03:00





             Test Item    Value        Reference Range Interpretation Comments

 

             UA Sq Epi (test code = UA Sq Epi) None Seen                        

      



[facility] [id number] 21:03:00





             Test Item    Value        Reference Range Interpretation Comments

 

             Glucose Lvl (test code = Glucose Lvl) 126          70-99           

          



[facility] [id number] 21:03:00





             Test Item    Value        Reference Range Interpretation Comments

 

             BUN (test code = BUN) 16           7-22                      



[place] 805122-03-26 21:03:00





             Test Item    Value        Reference Range Interpretation Comments

 

             Creatinine Lvl (test code = Creatinine 0.76         0.50-1.40      

           



             Lvl)                                                



[place] 805122-03-26 21:03:00





             Test Item    Value        Reference Range Interpretation Comments

 

             Sodium Lvl (test code = Sodium Lvl) 140          135-145           

        



[place] 805122-03-26 21:03:00





             Test Item    Value        Reference Range Interpretation Comments

 

             Potassium Lvl (test code = Potassium 3.3          3.5-5.1          

         



             Lvl)                                                



[place] 805122-03-26 21:03:00





             Test Item    Value        Reference Range Interpretation Comments

 

             Chloride Lvl (test code = Chloride Lvl) 111                  

            



[place] 805122-03-26 21:03:00





             Test Item    Value        Reference Range Interpretation Comments

 

             CO2 (test code = CO2) 22           24-32                     



[place] 805122-03-26 21:03:00





             Test Item    Value        Reference Range Interpretation Comments

 

             Calcium Lvl (test code = Calcium Lvl) 8.5          8.5-10.5        

          



[place] 805122-03-26 21:03:00





             Test Item    Value        Reference Range Interpretation Comments

 

             AGAP (test code = AGAP) 10.3         10.0-20.0                 



[place] 805122-03-26 21:03:00





             Test Item    Value        Reference Range Interpretation Comments

 

             eGFR (test code = eGFR) 117                                    



[place] 805122-03-26 21:03:00





             Test Item    Value        Reference Range Interpretation Comments

 

             Lactic Acid Lvl (test code = Lactic 1.1          0.5-2.2           

        



             Acid Lvl)                                           



[place] 726962-03-26 21:03:00





             Test Item    Value        Reference Range Interpretation Comments

 

             Segs (test code = Segs) 68.9         45.0-75.0                 



[place] 726962-03-26 21:03:00





             Test Item    Value        Reference Range Interpretation Comments

 

             Lymphocytes (test code = Lymphocytes) 20.4         20.0-40.0       

          



[place] [address]-03-26 21:03:00





             Test Item    Value        Reference Range Interpretation Comments

 

             Monocytes (test code = Monocytes) 8.2          2.0-12.0            

      



[place] 726962-03-26 21:03:00





             Test Item    Value        Reference Range Interpretation Comments

 

             Eosinophils (test code = 2.2          See_Comment                [A

utomated message] The



             Eosinophils)                                        system which ge

nerated



                                                                 this result tra

nsmitted



                                                                 reference range

: <=4.0.



                                                                 The reference r

zhen was



                                                                 not used to int

erpret



                                                                 this result as



                                                                 normal/abnormal

.



[place] 726962-03-26 21:03:00





             Test Item    Value        Reference Range Interpretation Comments

 

             Basophils (test code = 0.3          See_Comment                [Aut

omated message] The



             Basophils)                                          system which ge

nerated



                                                                 this result tra

nsmitted



                                                                 reference range

: <=1.0.



                                                                 The reference r

zhen was



                                                                 not used to int

erpret



                                                                 this result as



                                                                 normal/abnormal

.



[place] 726962-03-26 21:03:00





             Test Item    Value        Reference Range Interpretation Comments

 

             Neutrophils # (test code = Neutrophils 5.5          1.5-8.1        

           



             #)                                                  



[place] 726962-03-26 21:03:00





             Test Item    Value        Reference Range Interpretation Comments

 

             Lymphocytes # (test code = Lymphocytes 1.6          1.0-5.5        

           



             #)                                                  



[place] 726962-03-26 21:03:00





             Test Item    Value        Reference Range Interpretation Comments

 

             Monocytes # (test code 0.7          See_Comment                [Aut

omated message] The



             = Monocytes #)                                        system which 

generated



                                                                 this result tra

nsmitted



                                                                 reference range

: <=0.8.



                                                                 The reference r

zhen was



                                                                 not used to int

erpret



                                                                 this result as



                                                                 normal/abnormal

.



[place] 726962-03-26 21:03:00





             Test Item    Value        Reference Range Interpretation Comments

 

             Eosinophils # (test code 0.2          See_Comment                [A

utomated message] The



             = Eosinophils #)                                        system whic

h generated



                                                                 this result tra

nsmitted



                                                                 reference range

: <=0.5.



                                                                 The reference r

zhen was



                                                                 not used to int

erpret



                                                                 this result as



                                                                 normal/abnormal

.



[id number]





             Test Item    Value        Reference Range Interpretation Comments

 

             WBC (test code = WBC) 8.0          3.7-10.4                  



[place] 726962-03-26 21:03:00





             Test Item    Value        Reference Range Interpretation Comments

 

             RBC (test code = RBC) 5.37         4.70-6.10                 



[place] 726962-03-26 21:03:00





             Test Item    Value        Reference Range Interpretation Comments

 

             Hgb (test code = Hgb) 15.9         14.0-18.0                 



[facility] CenterGbvcrktXGFPZPUVUS4838-09-10 21:03:00





             Test Item    Value        Reference Range Interpretation Comments

 

             Hct (test code = Hct) 47.3         42.0-54.0                 



[facility] CenterJdremgkUTBINRQFVZ6462-31-54 21:03:00





             Test Item    Value        Reference Range Interpretation Comments

 

             MCV (test code = MCV) 88.1         80.0-94.0                 



[facility] CenterGhkiizyFYYBNYNKOT5497-78-68 21:03:00





             Test Item    Value        Reference Range Interpretation Comments

 

             MCH (test code = MCH) 29.6 pg      27.0-31.0                 



[facility] CenterGyrgragFCDGLYKORH1556-21-20 21:03:00





             Test Item    Value        Reference Range Interpretation Comments

 

             MCHC (test code = MCHC) 33.6         32.0-36.0                 



[facility] [id number] 21:03:00





             Test Item    Value        Reference Range Interpretation Comments

 

             RDW (test code = RDW) 15.3         11.5-14.5                 



[facility] CenterGxjmwpeTWFZSRJJRM3161-89-41 21:03:00





             Test Item    Value        Reference Range Interpretation Comments

 

             Platelet (test code = Platelet) 370          133-450               

    



[facility] CenterFnaglzkGQHSJJUQUS9818-64-15 21:03:00





             Test Item    Value        Reference Range Interpretation Comments

 

             MPV (test code = MPV) 8.1          7.4-10.4                  



[facility] AND [id number] 21:03:00





             Test Item    Value        Reference Range Interpretation Comments

 

             UA Comment 1 (test Suboptimal specimen                           



             code = UA Comment 1) received. Results may be                      

     



                          inaccurate due to the                           



                          age of the specimen.                           



                          Interpret results with                           



                          caution.                               



[facility] [id number] [facility]:03:00





             Test Item    Value        Reference Range Interpretation Comments

 

             UA Color (test code = Yellow *NA*(3/26/22                          

 



             UA Color)    4:03 PM)                               



[facility] [id number] 21:03:00





             Test Item    Value        Reference Range Interpretation Comments

 

             UA Turbidity (test code Slight *ABN*(3/26/22                       

    



             = UA Turbidity) 4:03 PM)                               



[facility] AND [id number] 21:03:00





             Test Item    Value        Reference Range Interpretation Comments

 

             UA Spec Grav (test code = UA Spec 1.015 1                          

      



             Grav)                                               



[facility] AND [id number] 21:03:00





             Test Item    Value        Reference Range Interpretation Comments

 

             UA pH (test code = UA pH) 5.0 1        5.0-8.0                   



[facility] AND [id number] 21:03:00





             Test Item    Value        Reference Range Interpretation Comments

 

             UA Protein (test code = UA Negative mg/dL                          

 



             Protein)                                            



[facility] AND [id number] 21:03:00





             Test Item    Value        Reference Range Interpretation Comments

 

             UA Glucose (test code = UA Negative mg/dL                          

 



             Glucose)                                            



[facility] AND [id number] 21:03:00





             Test Item    Value        Reference Range Interpretation Comments

 

             UA Ketones (test code = UA Negative mg/dL                          

 



             Ketones)                                            



[facility] AND [id number] 21:03:00





             Test Item    Value        Reference Range Interpretation Comments

 

             UA Bili (test code = Negative *NA*(3/26/22                         

  



             UA Bili)     4:03 PM)                               



[facility] AND [id number] 21:03:00





             Test Item    Value        Reference Range Interpretation Comments

 

             UA Blood (test code = Negative (3/26/22 4:03                       

    



             UA Blood)    PM)                                    



[facility] AND [id number] 21:03:00





             Test Item    Value        Reference Range Interpretation Comments

 

             UA Urobilinogen (test code = UA no gt        0.1-1.0               

    



             Urobilinogen)                                        



[facility] AND [id number] 21:03:00





             Test Item    Value        Reference Range Interpretation Comments

 

             UA Nitrite (test code Negative (3/26/22 4:03                       

    



             = UA Nitrite) PM)                                    



[facility] AND [id number] 21:03:00





             Test Item    Value        Reference Range Interpretation Comments

 

             UA Leuk Est (test code Large *ABN*(3/26/22                         

  



             = UA Leuk Est) 4:03 PM)                               



[facility] AND [id number] 21:03:00





             Test Item    Value        Reference Range Interpretation Comments

 

             UA WBC (test code = 64           See_Comment                [Automa

huma message] The



             UA WBC)                                             system which ge

nerated this



                                                                 result transmit

huma



                                                                 reference range

: <=5. The



                                                                 reference range

 was not



                                                                 used to interpr

et this



                                                                 result as zayda

l/abnormal.



[facility] AND [id number] 21:03:00





             Test Item    Value        Reference Range Interpretation Comments

 

             UA RBC (test code = 2            See_Comment                [Automa

huma message] The



             UA RBC)                                             system which ge

nerated this



                                                                 result transmit

huma



                                                                 reference range

: <=2. The



                                                                 reference range

 was not



                                                                 used to interpr

et this



                                                                 result as zayda

l/abnormal.



[facility] AND [id number] 21:03:00





             Test Item    Value        Reference Range Interpretation Comments

 

             UA Mucus (test code = UA Mucus) Few /LPF                           

    



[facility] AND [id number] 21:03:00





             Test Item    Value        Reference Range Interpretation Comments

 

             UA Sq Epi (test code = UA Sq Epi) None Seen                        

      



[facility]2022-03-26 21:03:00





             Test Item    Value        Reference Range Interpretation Comments

 

             Glucose Lvl (test code = Glucose Lvl) 126          70-99           

          



[place] 805122-03-26 21:03:00





             Test Item    Value        Reference Range Interpretation Comments

 

             BUN (test code = BUN) 16           7-22                      



[place] 805122-03-26 21:03:00





             Test Item    Value        Reference Range Interpretation Comments

 

             Creatinine Lvl (test code = Creatinine 0.76         0.50-1.40      

           



             Lvl)                                                



[facility]2022-03-26 21:03:00





             Test Item    Value        Reference Range Interpretation Comments

 

             Sodium Lvl (test code = Sodium Lvl) 140          135-145           

        



[place] 805122-03-26 21:03:00





             Test Item    Value        Reference Range Interpretation Comments

 

             Potassium Lvl (test code = Potassium 3.3          3.5-5.1          

         



             Lvl)                                                



[facility]2022-03-26 21:03:00





             Test Item    Value        Reference Range Interpretation Comments

 

             Chloride Lvl (test code = Chloride Lvl) 111                  

            



[place] 805122-03-26 21:03:00





             Test Item    Value        Reference Range Interpretation Comments

 

             CO2 (test code = CO2) 22           24-32                     



[place] 805122-03-26 21:03:00





             Test Item    Value        Reference Range Interpretation Comments

 

             Calcium Lvl (test code = Calcium Lvl) 8.5          8.5-10.5        

          



[facility]2022-03-26 21:03:00





             Test Item    Value        Reference Range Interpretation Comments

 

             AGAP (test code = AGAP) 10.3         10.0-20.0                 



[place] 805122-03-26 21:03:00





             Test Item    Value        Reference Range Interpretation Comments

 

             eGFR (test code = eGFR) 117                                    



[facility]2022-03-26 21:03:00





             Test Item    Value        Reference Range Interpretation Comments

 

             Lactic Acid Lvl (test code = Lactic 1.1          0.5-2.2           

        



             Acid Lvl)                                           



[facility] [id number] 21:03:00





             Test Item    Value        Reference Range Interpretation Comments

 

             Segs (test code = Segs) 68.9         45.0-75.0                 



[place] 726962-03-26 21:03:00





             Test Item    Value        Reference Range Interpretation Comments

 

             Lymphocytes (test code = Lymphocytes) 20.4         20.0-40.0       

          



[place] 726962-03-26 21:03:00





             Test Item    Value        Reference Range Interpretation Comments

 

             Monocytes (test code = Monocytes) 8.2          2.0-12.0            

      



[place] 726962-03-26 21:03:00





             Test Item    Value        Reference Range Interpretation Comments

 

             Eosinophils (test code = 2.2          See_Comment                [A

utomated message] The



             Eosinophils)                                        system which ge

nerated



                                                                 this result tra

nsmitted



                                                                 reference range

: <=4.0.



                                                                 The reference r

zhen was



                                                                 not used to int

erpret



                                                                 this result as



                                                                 normal/abnormal

.



[facility] [id number] 21:03:00





             Test Item    Value        Reference Range Interpretation Comments

 

             Basophils (test code = 0.3          See_Comment                [Aut

omated message] The



             Basophils)                                          system which ge

nerated



                                                                 this result tra

nsmitted



                                                                 reference range

: <=1.0.



                                                                 The reference r

zhen was



                                                                 not used to int

erpret



                                                                 this result as



                                                                 normal/abnormal

.



[place] 726962-03-26 21:03:00





             Test Item    Value        Reference Range Interpretation Comments

 

             Neutrophils # (test code = Neutrophils 5.5          1.5-8.1        

           



             #)                                                  



[place] [address]-03-26 21:03:00





             Test Item    Value        Reference Range Interpretation Comments

 

             Lymphocytes # (test code = Lymphocytes 1.6          1.0-5.5        

           



             #)                                                  



[place] [address]-03-26 21:03:00





             Test Item    Value        Reference Range Interpretation Comments

 

             Monocytes # (test code 0.7          See_Comment                [Aut

omated message] The



             = Monocytes #)                                        system which 

generated



                                                                 this result tra

nsmitted



                                                                 reference range

: <=0.8.



                                                                 The reference r

zhen was



                                                                 not used to int

erpret



                                                                 this result as



                                                                 normal/abnormal

.



[id number] [facility]:03:00





             Test Item    Value        Reference Range Interpretation Comments

 

             Eosinophils # (test code 0.2          See_Comment                [A

utomated message] The



             = Eosinophils #)                                        system whic

h generated



                                                                 this result tra

nsmitted



                                                                 reference range

: <=0.5.



                                                                 The reference r

zhen was



                                                                 not used to int

erpret



                                                                 this result as



                                                                 normal/abnormal

.



[facility] CenterWkojizuZGMXLBKDYK8241-43-05 21:03:00





             Test Item    Value        Reference Range Interpretation Comments

 

             WBC (test code = WBC) 8.0          3.7-10.4                  



[facility] CenterWlsjvnkTKEZYPKGQF0957-96-15 21:03:00





             Test Item    Value        Reference Range Interpretation Comments

 

             RBC (test code = RBC) 5.37         4.70-6.10                 



[facility] CenterTkepwyiPDADJOLXMO1540-34-71 21:03:00





             Test Item    Value        Reference Range Interpretation Comments

 

             Hgb (test code = Hgb) 15.9         14.0-18.0                 



[facility] CenterBfmtggrLQMMWDPPJX8537-69-56 21:03:00





             Test Item    Value        Reference Range Interpretation Comments

 

             Hct (test code = Hct) 47.3         42.0-54.0                 



[facility] CenterOfnvpytFDMPYVFJYE7470-99-03 21:03:00





             Test Item    Value        Reference Range Interpretation Comments

 

             MCV (test code = MCV) 88.1         80.0-94.0                 



[facility] CenterBkvltxaRQOVNXSQRE5260-78-62 21:03:00





             Test Item    Value        Reference Range Interpretation Comments

 

             MCH (test code = MCH) 29.6 pg      27.0-31.0                 



[facility] CenterWiszslhFKZHRVYNGQ4034-89-43 21:03:00





             Test Item    Value        Reference Range Interpretation Comments

 

             MCHC (test code = MCHC) 33.6         32.0-36.0                 



[facility] CenterAumexnkZYQCAVYZFP2014-36-88 21:03:00





             Test Item    Value        Reference Range Interpretation Comments

 

             RDW (test code = RDW) 15.3         11.5-14.5                 



[facility] CenterPltfsypKCXCGATPQV6156-03-97 21:03:00





             Test Item    Value        Reference Range Interpretation Comments

 

             Platelet (test code = Platelet) 370          133-450               

    



[facility] CenterNvudybeQDLIVDHLAU4206-35-29 21:03:00





             Test Item    Value        Reference Range Interpretation Comments

 

             MPV (test code = MPV) 8.1          7.4-10.4                  



[facility]2022-03-26 21:03:00





             Test Item    Value        Reference Range Interpretation Comments

 

             UA Comment 1 (test Suboptimal specimen                           



             code = UA Comment 1) received. Results may be                      

     



                          inaccurate due to the                           



                          age of the specimen.                           



                          Interpret results with                           



                          caution.                               



[facility] [id number] 21:03:00





             Test Item    Value        Reference Range Interpretation Comments

 

             UA Color (test code = Yellow *NA*(3/26/22                          

 



             UA Color)    4:03 PM)                               



[facility] AND [id number] 21:03:00





             Test Item    Value        Reference Range Interpretation Comments

 

             UA Turbidity (test code Slight *ABN*(3/26/22                       

    



             = UA Turbidity) 4:03 PM)                               



[facility] AND [id number] 21:03:00





             Test Item    Value        Reference Range Interpretation Comments

 

             UA Spec Grav (test code = UA Spec 1.015 1                          

      



             Grav)                                               



[facility] AND [id number] 21:03:00





             Test Item    Value        Reference Range Interpretation Comments

 

             UA pH (test code = UA pH) 5.0 1        5.0-8.0                   



[facility] AND [id number] 21:03:00





             Test Item    Value        Reference Range Interpretation Comments

 

             UA Protein (test code = UA Negative mg/dL                          

 



             Protein)                                            



[facility] AND [id number] 21:03:00





             Test Item    Value        Reference Range Interpretation Comments

 

             UA Glucose (test code = UA Negative mg/dL                          

 



             Glucose)                                            



[facility] AND [id number] 21:03:00





             Test Item    Value        Reference Range Interpretation Comments

 

             UA Ketones (test code = UA Negative mg/dL                          

 



             Ketones)                                            



[facility] AND [id number] 21:03:00





             Test Item    Value        Reference Range Interpretation Comments

 

             UA Bili (test code = Negative *NA*(3/26/22                         

  



             UA Bili)     4:03 PM)                               



[facility] AND [id number] 21:03:00





             Test Item    Value        Reference Range Interpretation Comments

 

             UA Blood (test code = Negative (3/26/22 4:03                       

    



             UA Blood)    PM)                                    



[facility] AND [id number] 21:03:00





             Test Item    Value        Reference Range Interpretation Comments

 

             UA Urobilinogen (test code = UA no gt        0.1-1.0               

    



             Urobilinogen)                                        



[facility] AND [id number] 21:03:00





             Test Item    Value        Reference Range Interpretation Comments

 

             UA Nitrite (test code Negative (3/26/22 4:03                       

    



             = UA Nitrite) PM)                                    



[facility] AND [id number] 21:03:00





             Test Item    Value        Reference Range Interpretation Comments

 

             UA Leuk Est (test code Large *ABN*(3/26/22                         

  



             = UA Leuk Est) 4:03 PM)                               



[facility] AND [id number] 21:03:00





             Test Item    Value        Reference Range Interpretation Comments

 

             UA WBC (test code = 64           See_Comment                [Automa

huma message] The



             UA WBC)                                             system which ge

nerated this



                                                                 result transmit

huma



                                                                 reference range

: <=5. The



                                                                 reference range

 was not



                                                                 used to interpr

et this



                                                                 result as zayda

l/abnormal.



[facility] AND [id number] 21:03:00





             Test Item    Value        Reference Range Interpretation Comments

 

             UA RBC (test code = 2            See_Comment                [Automa

huma message] The



             UA RBC)                                             system which ge

nerated this



                                                                 result transmit

huma



                                                                 reference range

: <=2. The



                                                                 reference range

 was not



                                                                 used to interpr

et this



                                                                 result as zayda

l/abnormal.



[facility] AND [id number] 21:03:00





             Test Item    Value        Reference Range Interpretation Comments

 

             UA Mucus (test code = UA Mucus) Few /LPF                           

    



[facility] AND [id number] 21:03:00





             Test Item    Value        Reference Range Interpretation Comments

 

             UA Sq Epi (test code = UA Sq Epi) None Seen                        

      



[facility]2022-03-26 21:03:00





             Test Item    Value        Reference Range Interpretation Comments

 

             Glucose Lvl (test code = Glucose Lvl) 126          70-99           

          



[facility]2022-03-26 21:03:00





             Test Item    Value        Reference Range Interpretation Comments

 

             BUN (test code = BUN) 16           7-22                      



[place] 805122-03-26 21:03:00





             Test Item    Value        Reference Range Interpretation Comments

 

             Creatinine Lvl (test code = Creatinine 0.76         0.50-1.40      

           



             Lvl)                                                



[facility]2022-03-26 21:03:00





             Test Item    Value        Reference Range Interpretation Comments

 

             Sodium Lvl (test code = Sodium Lvl) 140          135-145           

        



[facility]2022-03-26 21:03:00





             Test Item    Value        Reference Range Interpretation Comments

 

             Potassium Lvl (test code = Potassium 3.3          3.5-5.1          

         



             Lvl)                                                



[place] 805122-03-26 21:03:00





             Test Item    Value        Reference Range Interpretation Comments

 

             Chloride Lvl (test code = Chloride Lvl) 111                  

            



[place] 805122-03-26 21:03:00





             Test Item    Value        Reference Range Interpretation Comments

 

             CO2 (test code = CO2) 22           24-32                     



[place] 805122-03-26 21:03:00





             Test Item    Value        Reference Range Interpretation Comments

 

             Calcium Lvl (test code = Calcium Lvl) 8.5          8.5-10.5        

          



[place] [address]-03-26 21:03:00





             Test Item    Value        Reference Range Interpretation Comments

 

             AGAP (test code = AGAP) 10.3         10.0-20.0                 



[place] [address]-03-26 21:03:00





             Test Item    Value        Reference Range Interpretation Comments

 

             eGFR (test code = eGFR) 117                                    



[place] 805122-03-26 21:03:00





             Test Item    Value        Reference Range Interpretation Comments

 

             Lactic Acid Lvl (test code = Lactic 1.1          0.5-2.2           

        



             Acid Lvl)                                           



[place] 726962-03-26 21:03:00





             Test Item    Value        Reference Range Interpretation Comments

 

             Segs (test code = Segs) 68.9         45.0-75.0                 



[place] [address]-03-26 21:03:00





             Test Item    Value        Reference Range Interpretation Comments

 

             Lymphocytes (test code = Lymphocytes) 20.4         20.0-40.0       

          



[place] [address]-03-26 21:03:00





             Test Item    Value        Reference Range Interpretation Comments

 

             Monocytes (test code = Monocytes) 8.2          2.0-12.0            

      



[place] [address]-03-26 21:03:00





             Test Item    Value        Reference Range Interpretation Comments

 

             Eosinophils (test code = 2.2          See_Comment                [A

utomated message] The



             Eosinophils)                                        system which ge

nerated



                                                                 this result tra

nsmitted



                                                                 reference range

: <=4.0.



                                                                 The reference r

zhen was



                                                                 not used to int

erpret



                                                                 this result as



                                                                 normal/abnormal

.



[place] [address]-03-26 21:03:00





             Test Item    Value        Reference Range Interpretation Comments

 

             Basophils (test code = 0.3          See_Comment                [Aut

omated message] The



             Basophils)                                          system which ge

nerated



                                                                 this result tra

nsmitted



                                                                 reference range

: <=1.0.



                                                                 The reference r

zhen was



                                                                 not used to int

erpret



                                                                 this result as



                                                                 normal/abnormal

.



[place] 726962-03-26 21:03:00





             Test Item    Value        Reference Range Interpretation Comments

 

             Neutrophils # (test code = Neutrophils 5.5          1.5-8.1        

           



             #)                                                  



[place] 726962-03-26 21:03:00





             Test Item    Value        Reference Range Interpretation Comments

 

             Lymphocytes # (test code = Lymphocytes 1.6          1.0-5.5        

           



             #)                                                  



[place] 726962-03-26 21:03:00





             Test Item    Value        Reference Range Interpretation Comments

 

             Monocytes # (test code 0.7          See_Comment                [Aut

omated message] The



             = Monocytes #)                                        system which 

generated



                                                                 this result tra

nsmitted



                                                                 reference range

: <=0.8.



                                                                 The reference r

zhen was



                                                                 not used to int

erpret



                                                                 this result as



                                                                 normal/abnormal

.



[place] [address]-03-26 21:03:00





             Test Item    Value        Reference Range Interpretation Comments

 

             Eosinophils # (test code 0.2          See_Comment                [A

utomated message] The



             = Eosinophils #)                                        system whic

h generated



                                                                 this result tra

nsmitted



                                                                 reference range

: <=0.5.



                                                                 The reference r

zhen was



                                                                 not used to int

erpret



                                                                 this result as



                                                                 normal/abnormal

.



[place] 726962-03-26 21:03:00





             Test Item    Value        Reference Range Interpretation Comments

 

             WBC (test code = WBC) 8.0          3.7-10.4                  



[place] [address]-03-26 21:03:00





             Test Item    Value        Reference Range Interpretation Comments

 

             RBC (test code = RBC) 5.37         4.70-6.10                 



[place] [address]-03-26 21:03:00





             Test Item    Value        Reference Range Interpretation Comments

 

             Hgb (test code = Hgb) 15.9         14.0-18.0                 



[place] [address]-03-26 21:03:00





             Test Item    Value        Reference Range Interpretation Comments

 

             Hct (test code = Hct) 47.3         42.0-54.0                 



[place] 726962-03-26 21:03:00





             Test Item    Value        Reference Range Interpretation Comments

 

             MCV (test code = MCV) 88.1         80.0-94.0                 



[place] 726962-03-26 21:03:00





             Test Item    Value        Reference Range Interpretation Comments

 

             MCH (test code = MCH) 29.6 pg      27.0-31.0                 



[facility]





             Test Item    Value        Reference Range Interpretation Comments

 

             MCHC (test code = MCHC) 33.6         32.0-36.0                 



[facility] CenterAiffdhnKJROHUOAWL0780-24-59 21:03:00





             Test Item    Value        Reference Range Interpretation Comments

 

             RDW (test code = RDW) 15.3         11.5-14.5                 



[facility] CenterFmdvkuuZANLCGZYLQ3836-34-67 21:03:00





             Test Item    Value        Reference Range Interpretation Comments

 

             Platelet (test code = Platelet) 370          133-450               

    



[facility] CenterPmrvpjuFKAUDOMNJF9689-73-96 21:03:00





             Test Item    Value        Reference Range Interpretation Comments

 

             MPV (test code = MPV) 8.1          7.4-10.4                  



[facility] AND [id number] 21:03:00





             Test Item    Value        Reference Range Interpretation Comments

 

             UA Comment 1 (test Suboptimal specimen                           



             code = UA Comment 1) received. Results may be                      

     



                          inaccurate due to the                           



                          age of the specimen.                           



                          Interpret results with                           



                          caution.                               



[facility] AND [id number] 21:03:00





             Test Item    Value        Reference Range Interpretation Comments

 

             UA Color (test code = Yellow *NA*(3/26/22                          

 



             UA Color)    4:03 PM)                               



[facility] AND [id number] 21:03:00





             Test Item    Value        Reference Range Interpretation Comments

 

             UA Turbidity (test code Slight *ABN*(3/26/22                       

    



             = UA Turbidity) 4:03 PM)                               



[facility] AND [id number] 21:03:00





             Test Item    Value        Reference Range Interpretation Comments

 

             UA Spec Grav (test code = UA Spec 1.015 1                          

      



             Grav)                                               



[facility] AND [id number] 21:03:00





             Test Item    Value        Reference Range Interpretation Comments

 

             UA pH (test code = UA pH) 5.0 1        5.0-8.0                   



[facility] AND [id number] 21:03:00





             Test Item    Value        Reference Range Interpretation Comments

 

             UA Protein (test code = UA Negative mg/dL                          

 



             Protein)                                            



[facility] AND [id number] 21:03:00





             Test Item    Value        Reference Range Interpretation Comments

 

             UA Glucose (test code = UA Negative mg/dL                          

 



             Glucose)                                            



[facility] [id number] 21:03:00





             Test Item    Value        Reference Range Interpretation Comments

 

             UA Ketones (test code = UA Negative mg/dL                          

 



             Ketones)                                            



[facility] [id number] [facility]:03:00





             Test Item    Value        Reference Range Interpretation Comments

 

             UA Bili (test code = Negative *NA*(3/26/22                         

  



             UA Bili)     4:03 PM)                               



[facility] [id number] 21:03:00





             Test Item    Value        Reference Range Interpretation Comments

 

             UA Blood (test code = Negative (3/26/22 4:03                       

    



             UA Blood)    PM)                                    



[facility] [id number] 21:03:00





             Test Item    Value        Reference Range Interpretation Comments

 

             UA Urobilinogen (test code = UA no gt        0.1-1.0               

    



             Urobilinogen)                                        



[facility] [id number] 21:03:00





             Test Item    Value        Reference Range Interpretation Comments

 

             UA Nitrite (test code Negative (3/26/22 4:03                       

    



             = UA Nitrite) PM)                                    



[id number]





             Test Item    Value        Reference Range Interpretation Comments

 

             UA Leuk Est (test code Large *ABN*(3/26/22                         

  



             = UA Leuk Est) 4:03 PM)                               



[facility] [id number] 21:03:00





             Test Item    Value        Reference Range Interpretation Comments

 

             UA WBC (test code = 64           See_Comment                [Automa

huma message] The



             UA WBC)                                             system which ge

nerated this



                                                                 result transmit

huma



                                                                 reference range

: <=5. The



                                                                 reference range

 was not



                                                                 used to interpr

et this



                                                                 result as zayda

l/abnormal.



[facility] [id number] [facility]:03:00





             Test Item    Value        Reference Range Interpretation Comments

 

             UA RBC (test code = 2            See_Comment                [Automa

huma message] The



             UA RBC)                                             system which ge

nerated this



                                                                 result transmit

huma



                                                                 reference range

: <=2. The



                                                                 reference range

 was not



                                                                 used to interpr

et this



                                                                 result as zayda

l/abnormal.



[facility] [id number] 21:03:00





             Test Item    Value        Reference Range Interpretation Comments

 

             UA Mucus (test code = UA Mucus) Few /LPF                           

    



[facility] [id number] 21:03:00





             Test Item    Value        Reference Range Interpretation Comments

 

             UA Sq Epi (test code = UA Sq Epi) None Seen                        

      



[facility] [address]03-26 09:58:00





             Test Item    Value        Reference Range Interpretation Comments

 

             Lactic Acid Lvl (test code = Lactic 2.4          0.5-2.2           

        



             Acid Lvl)                                           



[address]03-26 09:58:00





             Test Item    Value        Reference Range Interpretation Comments

 

             Sed Rate (test code = 13           See_Comment                [Auto

mated message] The



             Sed Rate)                                           system which ge

nerated this



                                                                 result transmit

huma



                                                                 reference range

: <=15. The



                                                                 reference range

 was not



                                                                 used to interpr

et this



                                                                 result as zayda

l/abnormal.



[address]03-26 09:58:00





             Test Item    Value        Reference Range Interpretation Comments

 

             C-REACTIVE PROTEIN (test code = 10.6                               

    



             C-REACTIVE PROTEIN)                                        



[address]03-26 09:58:00





             Test Item    Value        Reference Range Interpretation Comments

 

             Lactic Acid Lvl (test code = Lactic 2.4          0.5-2.2           

        



             Acid Lvl)                                           



[address]03-26 09:58:00





             Test Item    Value        Reference Range Interpretation Comments

 

             Sed Rate (test code = 13           See_Comment                [Auto

mated message] The



             Sed Rate)                                           system which ge

nerated this



                                                                 result transmit

huma



                                                                 reference range

: <=15. The



                                                                 reference range

 was not



                                                                 used to interpr

et this



                                                                 result as zayda

l/abnormal.



[address]03-26 09:58:00





             Test Item    Value        Reference Range Interpretation Comments

 

             C-REACTIVE PROTEIN (test code = 10.6                               

    



             C-REACTIVE PROTEIN)                                        



[address]03-26 09:58:00





             Test Item    Value        Reference Range Interpretation Comments

 

             Lactic Acid Lvl (test code = Lactic 2.4          0.5-2.2           

        



             Acid Lvl)                                           



[address]03-26 09:58:00





             Test Item    Value        Reference Range Interpretation Comments

 

             Sed Rate (test code = 13           See_Comment                [Auto

mated message] The



             Sed Rate)                                           system which ge

nerated this



                                                                 result transmit

huma



                                                                 reference range

: <=15. The



                                                                 reference range

 was not



                                                                 used to interpr

et this



                                                                 result as zayda

l/abnormal.



[address]26 09:58:00





             Test Item    Value        Reference Range Interpretation Comments

 

             C-REACTIVE PROTEIN (test code = 10.6                               

    



             C-REACTIVE PROTEIN)                                        



[address]03-26 09:58:00





             Test Item    Value        Reference Range Interpretation Comments

 

             Lactic Acid Lvl (test code = Lactic 2.4          0.5-2.2           

        



             Acid Lvl)                                           



[id number]





             Test Item    Value        Reference Range Interpretation Comments

 

             Sed Rate (test code = 13           See_Comment                [Auto

mated message] The



             Sed Rate)                                           system which ge

nerated this



                                                                 result transmit

huma



                                                                 reference range

: <=15. The



                                                                 reference range

 was not



                                                                 used to interpr

et this



                                                                 result as zayda

l/abnormal.



[facility]





             Test Item    Value        Reference Range Interpretation Comments

 

             C-REACTIVE PROTEIN (test code = 10.6                               

    



             C-REACTIVE PROTEIN)                                        



[facility]2022-03-25 18:36:00





             Test Item    Value        Reference Range Interpretation Comments

 

             Protein CSF (test code = Protein CSF) 14           15-45           

          



[place] [id number] 18:36:00





             Test Item    Value        Reference Range Interpretation Comments

 

             Glucose CSF (test code = Glucose CSF) 67           45-80           

          



[facility]2022-03-25 18:36:00





             Test Item    Value        Reference Range Interpretation Comments

 

             Tube Num CSF (test xxxxxxx (3/25/22 1:36                           



             code = Tube Num CSF) PM)                                    



[facility]2022-03-25 18:36:00





             Test Item    Value        Reference Range Interpretation Comments

 

             Color CSF (test code Colorless (3/25/22 1:36                       

    



             = Color CSF) PM)                                    



[facility]2022-03-25 18:36:00





             Test Item    Value        Reference Range Interpretation Comments

 

             Clarity CSF (test code = Clear (3/25/22 1:36                       

    



             Clarity CSF) PM)                                    



[place] 402452-03-25 18:36:00





             Test Item    Value        Reference Range Interpretation Comments

 

             Supernat CSF (test Colorless (3/25/22 1:36                         

  



             code = Supernat CSF) PM)                                    



[place] 402452-03-25 18:36:00





             Test Item    Value        Reference Range Interpretation Comments

 

             Nucleated Cells CSF 0            See_Comment                [Automa

huma message] The



             (test code = Nucleated                                        syste

m which generated



             Cells CSF)                                          this result tra

nsmitted



                                                                 reference range

: <=53.



                                                                 The reference r

zhen was



                                                                 not used to int

erpret



                                                                 this result as



                                                                 normal/abnormal

.



[facility]2022-03-25 18:36:00





             Test Item    Value        Reference Range Interpretation Comments

 

             RBC CSF (test code = 0            See_Comment                [Autom

ated message] The



             RBC CSF)                                            system which ge

nerated this



                                                                 result transmit

huma



                                                                 reference range

: <=03. The



                                                                 reference range

 was not



                                                                 used to interpr

et this



                                                                 result as zayda

l/abnormal.



[facility] [id number] 18:36:00





             Test Item    Value        Reference Range Interpretation Comments

 

             Comment CSF (test Differential not                           



             code = Comment CSF) performed on WBC count of                      

     



                          less than 5.                           



[facility] [id number] [facility]:36:00





             Test Item    Value        Reference Range Interpretation Comments

 

             Gram Stain Report Gram Stain Performed By:                         

  



             (test code = Gram [facility]                           



             Stain Report) [facility]/Gram [id number] 18:36:00





             Test Item    Value        Reference Range Interpretation Comments

 

             Culture: CSF w/Gram Stain (test No Growth                          

    



             code = Culture: CSF w/Gram Stain)                                  

      



[facility]2022-03-25 18:36:00





             Test Item    Value        Reference Range Interpretation Comments

 

             Protein CSF (test code = Protein CSF) 14           15-45           

          



[facility]2022-03-25 18:36:00





             Test Item    Value        Reference Range Interpretation Comments

 

             Glucose CSF (test code = Glucose CSF) 67           45-80           

          



[facility]2022-03-25 18:36:00





             Test Item    Value        Reference Range Interpretation Comments

 

             Tube Num CSF (test xxxxxxx (3/25/22 1:36                           



             code = Tube Num CSF) PM)                                    



[facility]2022-03-25 18:36:00





             Test Item    Value        Reference Range Interpretation Comments

 

             Color CSF (test code Colorless (3/25/22 1:36                       

    



             = Color CSF) PM)                                    



[facility]2022-03-25 18:36:00





             Test Item    Value        Reference Range Interpretation Comments

 

             Clarity CSF (test code = Clear (3/25/22 1:36                       

    



             Clarity CSF) PM)                                    



[facility]2022-03-25 18:36:00





             Test Item    Value        Reference Range Interpretation Comments

 

             Supernat CSF (test Colorless (3/25/22 1:36                         

  



             code = Supernat CSF) PM)                                    



[facility]2022-03-25 18:36:00





             Test Item    Value        Reference Range Interpretation Comments

 

             Nucleated Cells CSF 0            See_Comment                [Automa

huma message] The



             (test code = Nucleated                                        syste

m which generated



             Cells CSF)                                          this result tra

nsmitted



                                                                 reference range

: <=53.



                                                                 The reference r

zhen was



                                                                 not used to int

erpret



                                                                 this result as



                                                                 normal/abnormal

.



[facility] [id number] 18:36:00





             Test Item    Value        Reference Range Interpretation Comments

 

             RBC CSF (test code = 0            See_Comment                [Autom

ated message] The



             RBC CSF)                                            system which ge

nerated this



                                                                 result transmit

huma



                                                                 reference range

: <=03. The



                                                                 reference range

 was not



                                                                 used to interpr

et this



                                                                 result as zayda

l/abnormal.



[facility] [id number] 18:36:00





             Test Item    Value        Reference Range Interpretation Comments

 

             Comment CSF (test Differential not                           



             code = Comment CSF) performed on WBC count of                      

     



                          less than 5.                           



[facility] [id number] 18:36:00





             Test Item    Value        Reference Range Interpretation Comments

 

             Gram Stain Report Gram Stain Performed By:                         

  



             (test code = Gram [facility]                           



             Stain Report) [facility] w/Gram [id number] 18:36:00





             Test Item    Value        Reference Range Interpretation Comments

 

             Culture: CSF w/Gram Stain (test No Growth                          

    



             code = Culture: CSF w/Gram Stain)                                  

      



[facility]2022-03-25 18:36:00





             Test Item    Value        Reference Range Interpretation Comments

 

             Protein CSF (test code = Protein CSF) 14           15-45           

          



[facility] [id number] 18:36:00





             Test Item    Value        Reference Range Interpretation Comments

 

             Glucose CSF (test code = Glucose CSF) 67           45-80           

          



[facility]2022-03-25 18:36:00





             Test Item    Value        Reference Range Interpretation Comments

 

             Tube Num CSF (test xxxxxxx (3/25/22 1:36                           



             code = Tube Num CSF) PM)                                    



[facility] [id number] 18:36:00





             Test Item    Value        Reference Range Interpretation Comments

 

             Color CSF (test code Colorless (3/25/22 1:36                       

    



             = Color CSF) PM)                                    



[facility] [id number] 18:36:00





             Test Item    Value        Reference Range Interpretation Comments

 

             Clarity CSF (test code = Clear (3/25/22 1:36                       

    



             Clarity CSF) PM)                                    



[facility]2022-03-25 18:36:00





             Test Item    Value        Reference Range Interpretation Comments

 

             Supernat CSF (test Colorless (3/25/22 1:36                         

  



             code = Supernat CSF) PM)                                    



[facility]2022-03-25 18:36:00





             Test Item    Value        Reference Range Interpretation Comments

 

             Nucleated Cells CSF 0            See_Comment                [Automa

huma message] The



             (test code = Nucleated                                        syste

m which generated



             Cells CSF)                                          this result tra

nsmitted



                                                                 reference range

: <=53.



                                                                 The reference r

zhen was



                                                                 not used to int

erpret



                                                                 this result as



                                                                 normal/abnormal

.



[facility]2022-03-25 18:36:00





             Test Item    Value        Reference Range Interpretation Comments

 

             RBC CSF (test code = 0            See_Comment                [Autom

ated message] The



             RBC CSF)                                            system which ge

nerated this



                                                                 result transmit

huma



                                                                 reference range

: <=03. The



                                                                 reference range

 was not



                                                                 used to interpr

et this



                                                                 result as zayda

l/abnormal.



[facility]2022-03-25 18:36:00





             Test Item    Value        Reference Range Interpretation Comments

 

             Comment CSF (test Differential not                           



             code = Comment CSF) performed on WBC count of                      

     



                          less than 5.                           



[facility] [id number]36:00





             Test Item    Value        Reference Range Interpretation Comments

 

             Gram Stain Report Gram Stain Performed By:                         

  



             (test code = Gram [facility]                           



             Stain Report) [facility] w/Gram [id number] 18:36:00





             Test Item    Value        Reference Range Interpretation Comments

 

             Culture: CSF w/Gram Stain (test No Growth                          

    



             code = Culture: CSF w/Gram Stain)                                  

      



[facility]2022-03-25 18:36:00





             Test Item    Value        Reference Range Interpretation Comments

 

             Protein CSF (test code = Protein CSF) 14           15-45           

          



[id number] 18:36:00





             Test Item    Value        Reference Range Interpretation Comments

 

             Glucose CSF (test code = Glucose CSF) 67           45-80           

          



[facility]2022-03-25 18:36:00





             Test Item    Value        Reference Range Interpretation Comments

 

             Tube Num CSF (test xxxxxxx (3/25/22 1:36                           



             code = Tube Num CSF) PM)                                    



[facility]2022-03-25 18:36:00





             Test Item    Value        Reference Range Interpretation Comments

 

             Color CSF (test code Colorless (3/25/22 1:36                       

    



             = Color CSF) PM)                                    



[facility]2022-03-25 18:36:00





             Test Item    Value        Reference Range Interpretation Comments

 

             Clarity CSF (test code = Clear (3/25/22 1:36                       

    



             Clarity CSF) PM)                                    



[facility] [id number] 18:36:00





             Test Item    Value        Reference Range Interpretation Comments

 

             Supernat CSF (test Colorless (3/25/22 1:36                         

  



             code = Supernat CSF) PM)                                    



[facility]2022-03-25 18:36:00





             Test Item    Value        Reference Range Interpretation Comments

 

             Nucleated Cells CSF 0            See_Comment                [Automa

huma message] The



             (test code = Nucleated                                        syste

m which generated



             Cells CSF)                                          this result tra

nsmitted



                                                                 reference range

: <=53.



                                                                 The reference r

zhen was



                                                                 not used to int

erpret



                                                                 this result as



                                                                 normal/abnormal

.



[facility] [id number] 18:36:00





             Test Item    Value        Reference Range Interpretation Comments

 

             RBC CSF (test code = 0            See_Comment                [Autom

ated message] The



             RBC CSF)                                            system which ge

nerated this



                                                                 result transmit

huma



                                                                 reference range

: <=03. The



                                                                 reference range

 was not



                                                                 used to interpr

et this



                                                                 result as zayda

l/abnormal.



[facility]2022-03-25 18:36:00





             Test Item    Value        Reference Range Interpretation Comments

 

             Comment CSF (test Differential not                           



             code = Comment CSF) performed on WBC count of                      

     



                          less than 5.                           



[facility] [id number] 18:36:00





             Test Item    Value        Reference Range Interpretation Comments

 

             Gram Stain Report Gram Stain Performed By:                         

  



             (test code = Gram [facility]                           



             Stain Report) [facility] w/Gram [id number] 18:36:00





             Test Item    Value        Reference Range Interpretation Comments

 

             Culture: CSF w/Gram Stain (test No Growth                          

    



             code = Culture: CSF w/Gram Stain)                                  

      



[facility] [id number] 08:28:00





             Test Item    Value        Reference Range Interpretation Comments

 

             Coronavirus (COVID-19) Not Detected (3/25/22                       

    



             ZEYAD (test code = 3:28 AM)                               



             Coronavirus (COVID-19)                                        



             ZEYAD)                                                



[facility] CenterJrqrqmwUZSTILNGSE6564-79-59 08:28:00





             Test Item    Value        Reference Range Interpretation Comments

 

             Coronavirus (COVID-19) Not Detected (3/25/22                       

    



             ZEYAD (test code = 3:28 AM)                               



             Coronavirus (COVID-19)                                        



             ZEYAD)                                                



[facility] CenterL[id number] 08:28:00





             Test Item    Value        Reference Range Interpretation Comments

 

             Coronavirus (COVID-19) Not Detected (3/25/22                       

    



             ZEYAD (test code = 3:28 AM)                               



             Coronavirus (COVID-19)                                        



             ZEYAD)                                                



[facility] CenterPywhefcOOKUOZEYMS7222-83-65 08:28:00





             Test Item    Value        Reference Range Interpretation Comments

 

             Coronavirus (COVID-19) Not Detected (3/25/22                       

    



             ZEYAD (test code = 3:28 AM)                               



             Coronavirus (COVID-19)                                        



             ZEYAD)                                                



[facility] CenterWklsjywWVHCSFQHRZ7718-34-83 06:48:00





             Test Item    Value        Reference Range Interpretation Comments

 

             MPV (test code = MPV) 8.5          7.4-10.4                  



[facility] CenterCehvgszUIVCKWIIBV3473-67-40 06:48:00





             Test Item    Value        Reference Range Interpretation Comments

 

             PT (test code = PT) 12.9 s       12.0-14.7                 



[facility] CenterMrmfdxwKTCHKRPTXE2504-12-97 06:48:00





             Test Item    Value        Reference Range Interpretation Comments

 

             INR (test code = INR) 0.98 1       0.85-1.17                 



[facility] CenterYieujouVHWQDLGZHE8582-69-14 06:48:00





             Test Item    Value        Reference Range Interpretation Comments

 

             PTT (test code = PTT) 31.4 s       22.9-35.8                 



[facility] triptap [id number] 06:48:00





             Test Item    Value        Reference Range Interpretation Comments

 

             Antibody Scrn (test Negative (3/25/22 1:48                         

  



             code = Antibody Scrn) AM)                                    



[facility] [id number] 06:48:00





             Test Item    Value        Reference Range Interpretation Comments

 

             ABO/Rh (test code = ABO/Rh) AB POS                                 



[facility] CenterZtoqqkoXISBUCITLR1840-33-66 06:48:00





             Test Item    Value        Reference Range Interpretation Comments

 

             Segs (test code = Segs) 70.8         45.0-75.0                 



[facility] CenterCygrdfvKWSOPGWXUY2184-31-22 06:48:00





             Test Item    Value        Reference Range Interpretation Comments

 

             Lymphocytes (test code = Lymphocytes) 20.8         20.0-40.0       

          



[facility] CenterYzmnhtlGCVMAGSZWB0420-44-60 06:48:00





             Test Item    Value        Reference Range Interpretation Comments

 

             Monocytes (test code = Monocytes) 5.8          2.0-12.0            

      



[facility] CenterMpneeswYSCEAPWHQE1632-60-86 06:48:00





             Test Item    Value        Reference Range Interpretation Comments

 

             Eosinophils (test code = 2.3          See_Comment                [A

utomated message] The



             Eosinophils)                                        system which ge

nerated



                                                                 this result tra

nsmitted



                                                                 reference range

: <=4.0.



                                                                 The reference r

zhen was



                                                                 not used to int

erpret



                                                                 this result as



                                                                 normal/abnormal

.



[place]26962-03-25 06:48:00





             Test Item    Value        Reference Range Interpretation Comments

 

             Basophils (test code = 0.3          See_Comment                [Aut

omated message] The



             Basophils)                                          system which ge

nerated



                                                                 this result tra

nsmitted



                                                                 reference range

: <=1.0.



                                                                 The reference r

zhen was



                                                                 not used to int

erpret



                                                                 this result as



                                                                 normal/abnormal

.



[place] 726962-03-25 06:48:00





             Test Item    Value        Reference Range Interpretation Comments

 

             Neutrophils # (test code = Neutrophils 8.4          1.5-8.1        

           



             #)                                                  



[place] 726962-03-25 06:48:00





             Test Item    Value        Reference Range Interpretation Comments

 

             Lymphocytes # (test code = Lymphocytes 2.5          1.0-5.5        

           



             #)                                                  



[place] 726962-03-25 06:48:00





             Test Item    Value        Reference Range Interpretation Comments

 

             Monocytes # (test code 0.7          See_Comment                [Aut

omated message] The



             = Monocytes #)                                        system which 

generated



                                                                 this result tra

nsmitted



                                                                 reference range

: <=0.8.



                                                                 The reference r

zhen was



                                                                 not used to int

erpret



                                                                 this result as



                                                                 normal/abnormal

.



[place] 726962-03-25 06:48:00





             Test Item    Value        Reference Range Interpretation Comments

 

             Eosinophils # (test code 0.3          See_Comment                [A

utomated message] The



             = Eosinophils #)                                        system whic

h generated



                                                                 this result tra

nsmitted



                                                                 reference range

: <=0.5.



                                                                 The reference r

zhen was



                                                                 not used to int

erpret



                                                                 this result as



                                                                 normal/abnormal

.



[facility] CenterGsoktgpZSSIHIYKNW6987-97-93 06:48:00





             Test Item    Value        Reference Range Interpretation Comments

 

             WBC X 10x3 (test code = WBC X 10x3) 11.9         3.7-10.4          

        



[place] 726962-03-25 06:48:00





             Test Item    Value        Reference Range Interpretation Comments

 

             RBC X 10x6 (test code = RBC X 10x6) 5.95         4.70-6.10         

        



[place] 726962-03-25 06:48:00





             Test Item    Value        Reference Range Interpretation Comments

 

             Hgb (test code = Hgb) 17.9         14.0-18.0                 



[facility] CenterXzoismsWPKFIXQLZK0573-48-83 06:48:00





             Test Item    Value        Reference Range Interpretation Comments

 

             Hct (test code = Hct) 52.0         42.0-54.0                 



[facility] CenterKyoadhmLXWMTNHBQV7970-87-23 06:48:00





             Test Item    Value        Reference Range Interpretation Comments

 

             MCV (test code = MCV) 87.5         80.0-94.0                 



[facility] CenterVugrsbtQZVRSDNXSD7913-15-13 06:48:00





             Test Item    Value        Reference Range Interpretation Comments

 

             MCH (test code = MCH) 30.2 pg      27.0-31.0                 



[facility] CenterHhwsmfvIYRDJNDKQP7445-97-66 06:48:00





             Test Item    Value        Reference Range Interpretation Comments

 

             MCHC (test code = MCHC) 34.5         32.0-36.0                 



[facility] CenterHdbbyegCEMIOAELIR4841-68-08 06:48:00





             Test Item    Value        Reference Range Interpretation Comments

 

             RDW (test code = RDW) 15.4         11.5-14.5                 



[facility] CenterQfodkgbXYGEHSKXPW7127-97-38 06:48:00





             Test Item    Value        Reference Range Interpretation Comments

 

             Platelet (test code = Platelet) 414          133-450               

    



[facility] CenterKzwawaaJQWWFUOVJL1620-72-64 06:48:00





             Test Item    Value        Reference Range Interpretation Comments

 

             MPV (test code = MPV) 8.5          7.4-10.4                  



[facility] CenterZogzbudOAYSBPPHFC4738-39-83 06:48:00





             Test Item    Value        Reference Range Interpretation Comments

 

             PT (test code = PT) 12.9 s       12.0-14.7                 



[facility] CenterPenoqnyYEMVNAXMPV5118-30-83 06:48:00





             Test Item    Value        Reference Range Interpretation Comments

 

             INR (test code = INR) 0.98 1       0.85-1.17                 



[facility] CenterIykpddeGQJIMLSLXI8809-40-54 06:48:00





             Test Item    Value        Reference Range Interpretation Comments

 

             PTT (test code = PTT) 31.4 s       22.9-35.8                 



[facility] triptap [id number] 06:48:00





             Test Item    Value        Reference Range Interpretation Comments

 

             Antibody Scrn (test Negative (3/25/22 1:48                         

  



             code = Antibody Scrn) AM)                                    



[facility] triptap ZZVRCQK1220-22-17 06:48:00





             Test Item    Value        Reference Range Interpretation Comments

 

             ABO/Rh (test code = ABO/Rh) AB POS                                 



[place] 726962-03-25 06:48:00





             Test Item    Value        Reference Range Interpretation Comments

 

             Segs (test code = Segs) 70.8         45.0-75.0                 



[place] [address]-03-25 06:48:00





             Test Item    Value        Reference Range Interpretation Comments

 

             Lymphocytes (test code = Lymphocytes) 20.8         20.0-40.0       

          



[place] 726962-03-25 06:48:00





             Test Item    Value        Reference Range Interpretation Comments

 

             Monocytes (test code = Monocytes) 5.8          2.0-12.0            

      



[place] [address]-03-25 06:48:00





             Test Item    Value        Reference Range Interpretation Comments

 

             Eosinophils (test code = 2.3          See_Comment                [A

utomated message] The



             Eosinophils)                                        system which ge

nerated



                                                                 this result tra

nsmitted



                                                                 reference range

: <=4.0.



                                                                 The reference r

zhen was



                                                                 not used to int

erpret



                                                                 this result as



                                                                 normal/abnormal

.



[place] [address]-03-25 06:48:00





             Test Item    Value        Reference Range Interpretation Comments

 

             Basophils (test code = 0.3          See_Comment                [Aut

omated message] The



             Basophils)                                          system which ge

nerated



                                                                 this result tra

nsmitted



                                                                 reference range

: <=1.0.



                                                                 The reference r

zhen was



                                                                 not used to int

erpret



                                                                 this result as



                                                                 normal/abnormal

.



[place] [address]-03-25 06:48:00





             Test Item    Value        Reference Range Interpretation Comments

 

             Neutrophils # (test code = Neutrophils 8.4          1.5-8.1        

           



             #)                                                  



[place] [address]-03-25 06:48:00





             Test Item    Value        Reference Range Interpretation Comments

 

             Lymphocytes # (test code = Lymphocytes 2.5          1.0-5.5        

           



             #)                                                  



[place] [address]-03-25 06:48:00





             Test Item    Value        Reference Range Interpretation Comments

 

             Monocytes # (test code 0.7          See_Comment                [Aut

omated message] The



             = Monocytes #)                                        system which 

generated



                                                                 this result tra

nsmitted



                                                                 reference range

: <=0.8.



                                                                 The reference r

zhen was



                                                                 not used to int

erpret



                                                                 this result as



                                                                 normal/abnormal

.



[place] 726962-03-25 06:48:00





             Test Item    Value        Reference Range Interpretation Comments

 

             Eosinophils # (test code 0.3          See_Comment                [A

utomated message] The



             = Eosinophils #)                                        system whic

h generated



                                                                 this result tra

nsmitted



                                                                 reference range

: <=0.5.



                                                                 The reference r

zhen was



                                                                 not used to int

erpret



                                                                 this result as



                                                                 normal/abnormal

.



[place] 726962-03-25 06:48:00





             Test Item    Value        Reference Range Interpretation Comments

 

             WBC X 10x3 (test code = WBC X 10x3) 11.9         3.7-10.4          

        



[place] 726962-03-25 06:48:00





             Test Item    Value        Reference Range Interpretation Comments

 

             RBC X 10x6 (test code = RBC X 10x6) 5.95         4.70-6.10         

        



[place] 726962-03-25 06:48:00





             Test Item    Value        Reference Range Interpretation Comments

 

             Hgb (test code = Hgb) 17.9         14.0-18.0                 



[place] 726962-03-25 06:48:00





             Test Item    Value        Reference Range Interpretation Comments

 

             Hct (test code = Hct) 52.0         42.0-54.0                 



[place] 726962-03-25 06:48:00





             Test Item    Value        Reference Range Interpretation Comments

 

             MCV (test code = MCV) 87.5         80.0-94.0                 



[place] 726962-03-25 06:48:00





             Test Item    Value        Reference Range Interpretation Comments

 

             MCH (test code = MCH) 30.2 pg      27.0-31.0                 



[place] 726962-03-25 06:48:00





             Test Item    Value        Reference Range Interpretation Comments

 

             MCHC (test code = MCHC) 34.5         32.0-36.0                 



[place] 726962-03-25 06:48:00





             Test Item    Value        Reference Range Interpretation Comments

 

             RDW (test code = RDW) 15.4         11.5-14.5                 



[place] 726962-03-25 06:48:00





             Test Item    Value        Reference Range Interpretation Comments

 

             Platelet (test code = Platelet) 414          133-450               

    



[place] 726962-03-25 06:48:00





             Test Item    Value        Reference Range Interpretation Comments

 

             MPV (test code = MPV) 8.5          7.4-10.4                  



[place] 726962-03-25 06:48:00





             Test Item    Value        Reference Range Interpretation Comments

 

             PT (test code = PT) 12.9 s       12.0-14.7                 



[place] 726962-03-25 06:48:00





             Test Item    Value        Reference Range Interpretation Comments

 

             INR (test code = INR) 0.98 1       0.85-1.17                 



[facility] CenterRidfieyTVLZKWDYXF9655-16-02 06:48:00





             Test Item    Value        Reference Range Interpretation Comments

 

             PTT (test code = PTT) 31.4 s       22.9-35.8                 



[facility] [id number] 06:48:00





             Test Item    Value        Reference Range Interpretation Comments

 

             Antibody Scrn (test Negative (3/25/22 1:48                         

  



             code = Antibody Scrn) AM)                                    



[facility] [id number] 06:48:00





             Test Item    Value        Reference Range Interpretation Comments

 

             ABO/Rh (test code = ABO/Rh) AB POS                                 



[facility] CenterTfrorsmQSZTQVGVUF3723-62-91 06:48:00





             Test Item    Value        Reference Range Interpretation Comments

 

             Segs (test code = Segs) 70.8         45.0-75.0                 



[facility] CenterSymqsijJWBFYGLWZQ3790-08-91 06:48:00





             Test Item    Value        Reference Range Interpretation Comments

 

             Lymphocytes (test code = Lymphocytes) 20.8         20.0-40.0       

          



[facility] CenterHwbzaruKOMSFIZUYV4962-51-05 06:48:00





             Test Item    Value        Reference Range Interpretation Comments

 

             Monocytes (test code = Monocytes) 5.8          2.0-12.0            

      



[facility] CenterKdgipreTHVQXEDRUD8657-45-70 06:48:00





             Test Item    Value        Reference Range Interpretation Comments

 

             Eosinophils (test code = 2.3          See_Comment                [A

utomated message] The



             Eosinophils)                                        system which ge

nerated



                                                                 this result tra

nsmitted



                                                                 reference range

: <=4.0.



                                                                 The reference r

zhen was



                                                                 not used to int

erpret



                                                                 this result as



                                                                 normal/abnormal

.



[facility] CenterAgiyskrYKJTSEADLK9057-89-34 06:48:00





             Test Item    Value        Reference Range Interpretation Comments

 

             Basophils (test code = 0.3          See_Comment                [Aut

omated message] The



             Basophils)                                          system which ge

nerated



                                                                 this result tra

nsmitted



                                                                 reference range

: <=1.0.



                                                                 The reference r

zhen was



                                                                 not used to int

erpret



                                                                 this result as



                                                                 normal/abnormal

.



[facility] CenterDcysnedCZQFWFOLBT2286-35-18 06:48:00





             Test Item    Value        Reference Range Interpretation Comments

 

             Neutrophils # (test code = Neutrophils 8.4          1.5-8.1        

           



             #)                                                  



[facility] CenterWhnlifwDZJWRXPHIL0765-90-63 06:48:00





             Test Item    Value        Reference Range Interpretation Comments

 

             Lymphocytes # (test code = Lymphocytes 2.5          1.0-5.5        

           



             #)                                                  



[facility] CenterGtjjeycZZJMHWBGOJ4865-74-43 06:48:00





             Test Item    Value        Reference Range Interpretation Comments

 

             Monocytes # (test code 0.7          See_Comment                [Aut

omated message] The



             = Monocytes #)                                        system which 

generated



                                                                 this result tra

nsmitted



                                                                 reference range

: <=0.8.



                                                                 The reference r

zhen was



                                                                 not used to int

erpret



                                                                 this result as



                                                                 normal/abnormal

.



[facility]KjqsvmlFBJJZACIFO4166[facility]40-36 06:48:00





             Test Item    Value        Reference Range Interpretation Comments

 

             Eosinophils # (test code 0.3          See_Comment                [A

utomated message] The



             = Eosinophils #)                                        system whic

h generated



                                                                 this result tra

nsmitted



                                                                 reference range

: <=0.5.



                                                                 The reference r

zhen was



                                                                 not used to int

erpret



                                                                 this result as



                                                                 normal/abnormal

.



[facility] CenterRaoioddLCIIYXYWDC1592-97-17 06:48:00





             Test Item    Value        Reference Range Interpretation Comments

 

             WBC X 10x3 (test code = WBC X 10x3) 11.9         3.7-10.4          

        



[place]26962-03-25 06:48:00





             Test Item    Value        Reference Range Interpretation Comments

 

             RBC X 10x6 (test code = RBC X 10x6) 5.95         4.70-6.10         

        



[facility] CenterRvsohgeLZAVPGDRGJ3391-88-40 06:48:00





             Test Item    Value        Reference Range Interpretation Comments

 

             Hgb (test code = Hgb) 17.9         14.0-18.0                 



[place] 726962-03-25 06:48:00





             Test Item    Value        Reference Range Interpretation Comments

 

             Hct (test code = Hct) 52.0         42.0-54.0                 



[place] 726962-03-25 06:48:00





             Test Item    Value        Reference Range Interpretation Comments

 

             MCV (test code = MCV) 87.5         80.0-94.0                 



[place] [address]-03-25 06:48:00





             Test Item    Value        Reference Range Interpretation Comments

 

             MCH (test code = MCH) 30.2 pg      27.0-31.0                 



[place] 726962-03-25 06:48:00





             Test Item    Value        Reference Range Interpretation Comments

 

             MCHC (test code = MCHC) 34.5         32.0-36.0                 



[place] 726962-03-25 06:48:00





             Test Item    Value        Reference Range Interpretation Comments

 

             RDW (test code = RDW) 15.4         11.5-14.5                 



[facility]





             Test Item    Value        Reference Range Interpretation Comments

 

             Platelet (test code = Platelet) 414          133-450               

    



[facility] CenterJsedrtyUFHQHRQWEU3588-15-76 06:48:00





             Test Item    Value        Reference Range Interpretation Comments

 

             MPV (test code = MPV) 8.5          7.4-10.4                  



[facility] CenterBazbytkQECIDQEFHO0878-67-89 06:48:00





             Test Item    Value        Reference Range Interpretation Comments

 

             PT (test code = PT) 12.9 s       12.0-14.7                 



[facility] CenterDzygeqpSVRNXQVUIJ4546-03-90 06:48:00





             Test Item    Value        Reference Range Interpretation Comments

 

             INR (test code = INR) 0.98 1       0.85-1.17                 



[facility] CenterUmqfeouHLAPYSDRLA6910-10-83 06:48:00





             Test Item    Value        Reference Range Interpretation Comments

 

             PTT (test code = PTT) 31.4 s       22.9-35.8                 



[facility] triptap AUGIMXC0759-74-05 06:48:00





             Test Item    Value        Reference Range Interpretation Comments

 

             Antibody Scrn (test Negative (3/25/22 1:48                         

  



             code = Antibody Scrn) AM)                                    



[facility] triptap TQFKCAW8724-71-31 06:48:00





             Test Item    Value        Reference Range Interpretation Comments

 

             ABO/Rh (test code = ABO/Rh) AB POS                                 



[facility]apXPUYJLCCZT9855[facility]





             Test Item    Value        Reference Range Interpretation Comments

 

             Segs (test code = Segs) 70.8         45.0-75.0                 



[facility] CenterBcuojzwWJHEDPRBVX1244-59-64 06:48:00





             Test Item    Value        Reference Range Interpretation Comments

 

             Lymphocytes (test code = Lymphocytes) 20.8         20.0-40.0       

          



[facility] CenterHvpmqquSTSQKOPXJX1320-00-59 06:48:00





             Test Item    Value        Reference Range Interpretation Comments

 

             Monocytes (test code = Monocytes) 5.8          2.0-12.0            

      



[facility] CenterAwbenvtEXMGWRTUAV3302-72-39 06:48:00





             Test Item    Value        Reference Range Interpretation Comments

 

             Eosinophils (test code = 2.3          See_Comment                [A

utomated message] The



             Eosinophils)                                        system which ge

nerated



                                                                 this result tra

nsmitted



                                                                 reference range

: <=4.0.



                                                                 The reference r

zhen was



                                                                 not used to int

erpret



                                                                 this result as



                                                                 normal/abnormal

.



[facility] CenterMtglakwMMQPTQPSYX9850-35-46 06:48:00





             Test Item    Value        Reference Range Interpretation Comments

 

             Basophils (test code = 0.3          See_Comment                [Aut

omated message] The



             Basophils)                                          system which ge

nerated



                                                                 this result tra

nsmitted



                                                                 reference range

: <=1.0.



                                                                 The reference r

zhen was



                                                                 not used to int

erpret



                                                                 this result as



                                                                 normal/abnormal

.



[place] 726962-03-25 06:48:00





             Test Item    Value        Reference Range Interpretation Comments

 

             Neutrophils # (test code = Neutrophils 8.4          1.5-8.1        

           



             #)                                                  



[place] 726962-03-25 06:48:00





             Test Item    Value        Reference Range Interpretation Comments

 

             Lymphocytes # (test code = Lymphocytes 2.5          1.0-5.5        

           



             #)                                                  



[place] 726962-03-25 06:48:00





             Test Item    Value        Reference Range Interpretation Comments

 

             Monocytes # (test code 0.7          See_Comment                [Aut

omated message] The



             = Monocytes #)                                        system which 

generated



                                                                 this result tra

nsmitted



                                                                 reference range

: <=0.8.



                                                                 The reference r

zhen was



                                                                 not used to int

erpret



                                                                 this result as



                                                                 normal/abnormal

.



[facility] CenterIfazfnkUUVBVRVJKH0872-48-00 06:48:00





             Test Item    Value        Reference Range Interpretation Comments

 

             Eosinophils # (test code 0.3          See_Comment                [A

utomated message] The



             = Eosinophils #)                                        system whic

h generated



                                                                 this result tra

nsmitted



                                                                 reference range

: <=0.5.



                                                                 The reference r

zhen was



                                                                 not used to int

erpret



                                                                 this result as



                                                                 normal/abnormal

.



[facility] CenterBedytrhMNGZIOWGTV4718-18-46 06:48:00





             Test Item    Value        Reference Range Interpretation Comments

 

             WBC X 10x3 (test code = WBC X 10x3) 11.9         3.7-10.4          

        



[place] [address]-03-25 06:48:00





             Test Item    Value        Reference Range Interpretation Comments

 

             RBC X 10x6 (test code = RBC X 10x6) 5.95         4.70-6.10         

        



[place] 726962-03-25 06:48:00





             Test Item    Value        Reference Range Interpretation Comments

 

             Hgb (test code = Hgb) 17.9         14.0-18.0                 



[place] 726962-03-25 06:48:00





             Test Item    Value        Reference Range Interpretation Comments

 

             Hct (test code = Hct) 52.0         42.0-54.0                 



[facility]tf[facility]WCV[facility]OY3766[facility]





             Test Item    Value        Reference Range Interpretation Comments

 

             MCV (test code = MCV) 87.5         80.0-94.0                 



[facility] HospitalEkqnhrjZLBXGKGGLR3893-09-41 06:48:00





             Test Item    Value        Reference Range Interpretation Comments

 

             MCH (test code = MCH) 30.2 pg      27.0-31.0                 



[facility] CenterJkvtwmaWWCFCEPVJP9998-42-88 06:48:00





             Test Item    Value        Reference Range Interpretation Comments

 

             MCHC (test code = MCHC) 34.5         32.0-36.0                 



[facility] HospitalPdkxypiBDMBDRRXGC8994-65-73 06:48:00





             Test Item    Value        Reference Range Interpretation Comments

 

             RDW (test code = RDW) 15.4         11.5-14.5                 



[facility] CenterZaespbhIASPVCYZNP4635-36-61 06:48:00





             Test Item    Value        Reference Range Interpretation Comments

 

             Platelet (test code = Platelet) 414          133-450               

    



[facility] WITH [id number] 00:23:28





             Test Item    Value        Reference Range Interpretation Comments

 

             WBC (test code =              See_Comment  H             [Automated



             0690-2)                                             message] The sy

stem



                                                                 which generated



                                                                 this result



                                                                 transmitted



                                                                 reference range

:



                                                                 4.20 - 10.70



                                                                 10*3/?L. The



                                                                 reference range

 was



                                                                 not used to



                                                                 interpret this



                                                                 result as



                                                                 normal/abnormal

.

 

             RBC (test code =              See_Comment  H             [Automated



             499-8)                                              message] The sy

stem



                                                                 which generated



                                                                 this result



                                                                 transmitted



                                                                 reference range

:



                                                                 4.26 - 5.52



                                                                 10*6/?L. The



                                                                 reference range

 was



                                                                 not used to



                                                                 interpret this



                                                                 result as



                                                                 normal/abnormal

.

 

             HGB (test code = 18.3 g/dL    12.2-16.4    H            



             718-7)                                              

 

             HCT (test code = 55.6 %       38.4-49.3    H            



             4544-3)                                             

 

             MCV (test code = 89.0 fL      81.7-95.6                 



             787-2)                                              

 

             MCH (test code = 29.3 pg      26.1-32.7                 



             785-6)                                              

 

             MCHC (test code = 32.9 g/dL    31.2-35.0                 



             786-4)                                              

 

             RDW-SD (test code = 47.6 fL      38.5-51.6                 



             64439-2)                                            

 

             RDW-CV (test code = 15.1 %       12.1-15.4                 



             788-0)                                              

 

             PLT (test code =              See_Comment  H             [Automated



             777-3)                                              message] The sy

stem



                                                                 which generated



                                                                 this result



                                                                 transmitted



                                                                 reference range

:



                                                                 150 - 328 10*3/

?L.



                                                                 The reference r

zhen



                                                                 was not used to



                                                                 interpret this



                                                                 result as



                                                                 normal/abnormal

.

 

             MPV (test code = 10.5 fL      9.8-13.0                  



             92713-1)                                            

 

             NRBC/100 WBC (test              See_Comment                [Automat

ed



             code = [id number])                                        message] 

The system



                                                                 which generated



                                                                 this result



                                                                 transmitted



                                                                 reference range

:



                                                                 0.0 - 10.0 /100



                                                                 WBCs. The refer

ence



                                                                 range was not u

sed



                                                                 to interpret th

is



                                                                 result as



                                                                 normal/abnormal

.

 

             NRBC x10^3 (test code <0.01        See_Comment                [Auto

mated



             = [id number])                                        message] The s

ystem



                                                                 which generated



                                                                 this result



                                                                 transmitted



                                                                 reference range

:



                                                                 10*3/?L. The



                                                                 reference range

 was



                                                                 not used to



                                                                 interpret this



                                                                 result as



                                                                 normal/abnormal

.

 

             GRAN MAT (NEUT) % 66.7 %                                 



             (test code = 770-8)                                        

 

             IMM GRAN % (test code 0.40 %                                 



             = [id number])                                        

 

             LYMPH % (test code = 24.4 %                                 



             736-9)                                              

 

             MONO % (test code = 6.6 %                                  



             5905-5)                                             

 

             EOS % (test code = 1.5 %                                  



             713-8)                                              

 

             BASO % (test code = 0.4 %                                  



             706-2)                                              

 

             GRAN MAT x10^3(ANC) 7.43 10*3/uL 1.99-6.95    H            



             (test code =                                        



             1298452878)                                         

 

             IMM GRAN x10^3 (test 0.04 10*3/uL 0.00-0.06                 



             code = 6942095071)                                        

 

             LYMPH x10^3 (test code 2.72 10*3/uL 1.09-3.23                 



             = 731-0)                                            

 

             MONO x10^3 (test code 0.74 10*3/uL 0.36-1.02                 



             = 742-7)                                            

 

             EOS x10^3 (test code = 0.17 10*3/uL 0.06-0.53                 



             711-2)                                              

 

             BASO x10^3 (test code 0.04 10*3/uL 0.01-0.09                 



             = 704-7)                                            

 

             Lab Interpretation Abnormal                               



             (test code = 51606-9)                                        



[facility]. METABOLIC PANEL [id number] 
00:18:07





             Test Item    Value        Reference Range Interpretation Comments

 

             NA (test code = 139 mmol/L   135-145                   



             8711359413)                                         

 

             K (test code = 4.8 mmol/L   3.5-5.0                   



             0353754015)                                         

 

             CL (test code = 104 mmol/L                       



             5797160255)                                         

 

             CO2 TOTAL (test code = 22 mmol/L    23-31        L            



             6532425754)                                         

 

             AGAP (test code =              2-16                      



             9206195030)                                         

 

             BUN (test code = 15 mg/dL     7-23                      



             3364302881)                                         

 

             GLUCOSE (test code = 93 mg/dL                         



             7897360504)                                         

 

             CREATININE (test code = 0.67 mg/dL   0.60-1.25                 



             0208560812)                                         

 

             TOTAL BILI (test code = 0.7 mg/dL    0.1-1.1                   



             5479150225)                                         

 

             CALCIUM (test code = 9.8 mg/dL    8.6-10.6                  



             3299958231)                                         

 

             T PROTEIN (test code = 8.9 g/dL     6.3-8.2      H            



             9706662266)                                         

 

             ALBUMIN (test code = 4.9 g/dL     3.5-5.0                   



             3120813134)                                         

 

             ALK PHOS (test code = 126 U/L             H            



             1887723024)                                         

 

             ALTv (test code = 43 U/L       5-50                      



             1742-6)                                             

 

             AST(SGOT) (test code = 28 U/L       13-40                     



             4029477148)                                         

 

             eGFR (test code =              mL/min/1.73m2              



             9674601385)                                         

 

             MAL (test code = MAL) Association of                           



                          Glomerular Filtration                           



                          Rate (GFR) and Staging                           



                          of Kidney Disease*                           



                          +---------------------                           



                          --+-------------------                           



                          --+-------------------                           



                          ------+| GFR                           



                          (mL/min/1.73 m2) ?|                           



                          With Kidney Damage ?|                           



                          ?Without Kidney                           



                          Damage+---------------                           



                          --------+-------------                           



                          --------+-------------                           



                          ------------+| ?>90 ?                           



                          ? ? ? ? ? ? ? ?|                           



                          ?Stage one ? ? ? ? ?|                           



                          ? Normal ? ? ? ? ? ? ?                           



                          ?+--------------------                           



                          ---+------------------                           



                          ---+------------------                           



                          -------+| ?60-89 ? ? ?                           



                          ? ? ? ? ?| ?Stage two                           



                          ? ? ? ? ?| ? Decreased                           



                          GFR ? ? ? ?                            



                          +---------------------                           



                          --+-------------------                           



                          --+-------------------                           



                          ------+| ?30-59 ? ? ?                           



                          ? ? ? ? ?| ?Stage                           



                          three ? ? ? ?| ? Stage                           



                          three ? ? ? ? ?                           



                          +---------------------                           



                          --+-------------------                           



                          --+-------------------                           



                          ------+| ?15-29 ? ? ?                           



                          ? ? ? ? ?| ?Stage four                           



                          ? ? ? ? | ? Stage four                           



                          ? ? ? ? ?                              



                          ?+--------------------                           



                          ---+------------------                           



                          ---+------------------                           



                          -------+| ?<15 (or                           



                          dialysis) ? ?| ?Stage                           



                          five ? ? ? ? | ? Stage                           



                          five ? ? ? ? ?                           



                          ?+--------------------                           



                          ---+------------------                           



                          ---+------------------                           



                          -------+ *Each stage                           



                          assumes the associated                           



                          GFR level has been in                           



                          effect for at least                           



                          three months. ?Stages                           



                          1 to 5, with or                           



                          without kidney                           



                          disease, indicate                           



                          chronic kidney                           



                          disease. Notes:                           



                          Determination of                           



                          stages one and two                           



                          (with eGFR                             



                          >59mL/min/1.73 m2)                           



                          requires estimation of                           



                          kidney damage for at                           



                          least three months as                           



                          defined by structural                           



                          or functional                           



                          abnormalities of the                           



                          kidney, manifested by                           



                          either:Pathological                           



                          abnormalities or                           



                          Markers of kidney                           



                          damage (including                           



                          abnormalities in the                           



                          composition of the                           



                          blood or urine or                           



                          abnormalities in                           



                          imaging tests).                           

 

             Lab Interpretation Abnormal                               



             (test code = 51087-0)                                        



[facility]. METABOLIC PANEL [id number] 
12:48:40





             Test Item    Value        Reference Range Interpretation Comments

 

             NA (test code = 137 mmol/L   135-145                   



             6206394706)                                         

 

             K (test code = 4.5 mmol/L   3.5-5.0                   



             8017671029)                                         

 

             CL (test code = 107 mmol/L                       



             6200298778)                                         

 

             CO2 TOTAL (test code = 24 mmol/L    23-31                     



             2946970134)                                         

 

             AGAP (test code =              2-16                      



             7221325035)                                         

 

             BUN (test code = 16 mg/dL     7-23                      



             3539476605)                                         

 

             GLUCOSE (test code = 116 mg/dL           H            



             3648258618)                                         

 

             CREATININE (test code = 0.62 mg/dL   0.60-1.25                 



             0146892760)                                         

 

             TOTAL BILI (test code = 0.4 mg/dL    0.1-1.1                   



             2503993193)                                         

 

             CALCIUM (test code = 8.7 mg/dL    8.6-10.6                  



             1884250887)                                         

 

             T PROTEIN (test code = 7.5 g/dL     6.3-8.2                   



             9996535847)                                         

 

             ALBUMIN (test code = 3.9 g/dL     3.5-5.0                   



             5035330951)                                         

 

             ALK PHOS (test code = 93 U/L                           



             1306000204)                                         

 

             ALTv (test code = 40 U/L       5-50                      



             2-6)                                             

 

             AST(SGOT) (test code = 51 U/L       13-40        H            



             5716480097)                                         

 

             eGFR (test code =              mL/min/1.73m2              



             8592901497)                                         

 

             MAL (test code = MAL) Association of                           



                          Glomerular Filtration                           



                          Rate (GFR) and Staging                           



                          of Kidney Disease*                           



                          +---------------------                           



                          --+-------------------                           



                          --+-------------------                           



                          ------+| GFR                           



                          (mL/min/1.73 m2) ?|                           



                          With Kidney Damage ?|                           



                          ?Without Kidney                           



                          Damage+---------------                           



                          --------+-------------                           



                          --------+-------------                           



                          ------------+| ?>90 ?                           



                          ? ? ? ? ? ? ? ?|                           



                          ?Stage one ? ? ? ? ?|                           



                          ? Normal ? ? ? ? ? ? ?                           



                          ?+--------------------                           



                          ---+------------------                           



                          ---+------------------                           



                          -------+| ?60-89 ? ? ?                           



                          ? ? ? ? ?| ?Stage two                           



                          ? ? ? ? ?| ? Decreased                           



                          GFR ? ? ? ?                            



                          +---------------------                           



                          --+-------------------                           



                          --+-------------------                           



                          ------+| ?30-59 ? ? ?                           



                          ? ? ? ? ?| ?Stage                           



                          three ? ? ? ?| ? Stage                           



                          three ? ? ? ? ?                           



                          +---------------------                           



                          --+-------------------                           



                          --+-------------------                           



                          ------+| ?15-29 ? ? ?                           



                          ? ? ? ? ?| ?Stage four                           



                          ? ? ? ? | ? Stage four                           



                          ? ? ? ? ?                              



                          ?+--------------------                           



                          ---+------------------                           



                          ---+------------------                           



                          -------+| ?<15 (or                           



                          dialysis) ? ?| ?Stage                           



                          five ? ? ? ? | ? Stage                           



                          five ? ? ? ? ?                           



                          ?+--------------------                           



                          ---+------------------                           



                          ---+------------------                           



                          -------+ *Each stage                           



                          assumes the associated                           



                          GFR level has been in                           



                          effect for at least                           



                          three months. ?Stages                           



                          1 to 5, with or                           



                          without kidney                           



                          disease, indicate                           



                          chronic kidney                           



                          disease. Notes:                           



                          Determination of                           



                          stages one and two                           



                          (with eGFR                             



                          >59mL/min/1.73 m2)                           



                          requires estimation of                           



                          kidney damage for at                           



                          least three months as                           



                          defined by structural                           



                          or functional                           



                          abnormalities of the                           



                          kidney, manifested by                           



                          either:Pathological                           



                          abnormalities or                           



                          Markers of kidney                           



                          damage (including                           



                          abnormalities in the                           



                          composition of the                           



                          blood or urine or                           



                          abnormalities in                           



                          imaging tests).                           

 

             Lab Interpretation Abnormal                               



             (test code = 59184-7)                                        



[facility] WITH [id number] 12:21:36





             Test Item    Value        Reference Range Interpretation Comments

 

             WBC (test code =              See_Comment                [Automated



             4866-2)                                             message] The sy

stem



                                                                 which generated



                                                                 this result



                                                                 transmitted



                                                                 reference range

:



                                                                 4.20 - 10.70



                                                                 10*3/?L. The



                                                                 reference range

 was



                                                                 not used to



                                                                 interpret this



                                                                 result as



                                                                 normal/abnormal

.

 

             RBC (test code =              See_Comment                [Automated



             622-3)                                              message] The sy

stem



                                                                 which generated



                                                                 this result



                                                                 transmitted



                                                                 reference range

:



                                                                 4.26 - 5.52



                                                                 10*6/?L. The



                                                                 reference range

 was



                                                                 not used to



                                                                 interpret this



                                                                 result as



                                                                 normal/abnormal

.

 

             HGB (test code = 15.7 g/dL    12.2-16.4                 



             718-7)                                              

 

             HCT (test code = 48.0 %       38.4-49.3                 



             4544-3)                                             

 

             MCV (test code = 90.1 fL      81.7-95.6                 



             787-2)                                              

 

             MCH (test code = 29.5 pg      26.1-32.7                 



             785-6)                                              

 

             MCHC (test code = 32.7 g/dL    31.2-35.0                 



             786-4)                                              

 

             RDW-SD (test code = 50.6 fL      38.5-51.6                 



             79865-6)                                            

 

             RDW-CV (test code = 15.3 %       12.1-15.4                 



             788-0)                                              

 

             PLT (test code =              See_Comment  H             [Automated



             777-3)                                              message] The sy

stem



                                                                 which generated



                                                                 this result



                                                                 transmitted



                                                                 reference range

:



                                                                 150 - 328 10*3/

?L.



                                                                 The reference r

zhen



                                                                 was not used to



                                                                 interpret this



                                                                 result as



                                                                 normal/abnormal

.

 

             MPV (test code = 10.3 fL      9.8-13.0                  



             89737-2)                                            

 

             NRBC/100 WBC (test              See_Comment                [Automat

ed



             code = 7178049583)                                        message] 

The system



                                                                 which generated



                                                                 this result



                                                                 transmitted



                                                                 reference range

:



                                                                 0.0 - 10.0 /100



                                                                 WBCs. The refer

ence



                                                                 range was not u

sed



                                                                 to interpret th

is



                                                                 result as



                                                                 normal/abnormal

.

 

             NRBC x10^3 (test code <0.01        See_Comment                [Auto

mated



             = 1369736230)                                        message] The s

ystem



                                                                 which generated



                                                                 this result



                                                                 transmitted



                                                                 reference range

:



                                                                 10*3/?L. The



                                                                 reference range

 was



                                                                 not used to



                                                                 interpret this



                                                                 result as



                                                                 normal/abnormal

.

 

             GRAN MAT (NEUT) % 61.5 %                                 



             (test code = 770-8)                                        

 

             IMM GRAN % (test code 0.80 %                                 



             = 5017891261)                                        

 

             LYMPH % (test code = 27.8 %                                 



             736-9)                                              

 

             MONO % (test code = 6.9 %                                  



             5905-5)                                             

 

             EOS % (test code = 2.4 %                                  



             713-8)                                              

 

             BASO % (test code = 0.6 %                                  



             706-2)                                              

 

             GRAN MAT x10^3(ANC) 6.07 10*3/uL 1.99-6.95                 



             (test code =                                        



             6864350761)                                         

 

             IMM GRAN x10^3 (test 0.08 10*3/uL 0.00-0.06    H            



             code = 8453704174)                                        

 

             LYMPH x10^3 (test code 2.75 10*3/uL 1.09-3.23                 



             = 731-0)                                            

 

             MONO x10^3 (test code 0.68 10*3/uL 0.36-1.02                 



             = 742-7)                                            

 

             EOS x10^3 (test code = 0.24 10*3/uL 0.06-0.53                 



             711-2)                                              

 

             BASO x10^3 (test code 0.06 10*3/uL 0.01-0.09                 



             = 704-7)                                            

 

             Lab Interpretation Abnormal                               



             (test code = 98785-4)                                        



[facility] Metabolic Panel (NA, K, CL, CO2, 
GLUCOSE, BUN, CREATININE, CA)2022 12:40:30





             Test Item    Value        Reference Range Interpretation Comments

 

             NA (test code = 139 mmol/L   135-145                   



             5087683294)                                         

 

             K (test code = 3.9 mmol/L   3.5-5.0                   



             4942715935)                                         

 

             CL (test code = 108 mmol/L                       



             7240422909)                                         

 

             CO2 TOTAL (test code = 25 mmol/L    23-31                     



             5768053184)                                         

 

             AGAP (test code =              2-16                      



             7672660647)                                         

 

             BUN (test code = 14 mg/dL     7-23                      



             9292775550)                                         

 

             GLUCOSE (test code = 107 mg/dL                        



             6939827964)                                         

 

             CREATININE (test code = 0.61 mg/dL   0.60-1.25                 



             7952261121)                                         

 

             CALCIUM (test code = 8.1 mg/dL    8.6-10.6     L            



             4413391540)                                         

 

             eGFR (test code =              mL/min/1.73m2              



             1583021701)                                         

 

             MAL (test code = MAL) Association of                           



                          Glomerular Filtration                           



                          Rate (GFR) and Staging                           



                          of Kidney Disease*                           



                          +---------------------                           



                          --+-------------------                           



                          --+-------------------                           



                          ------+| GFR                           



                          (mL/min/1.73 m2) ?|                           



                          With Kidney Damage ?|                           



                          ?Without Kidney                           



                          Damage+---------------                           



                          --------+-------------                           



                          --------+-------------                           



                          ------------+| ?>90 ?                           



                          ? ? ? ? ? ? ? ?|                           



                          ?Stage one ? ? ? ? ?|                           



                          ? Normal ? ? ? ? ? ? ?                           



                          ?+--------------------                           



                          ---+------------------                           



                          ---+------------------                           



                          -------+| ?60-89 ? ? ?                           



                          ? ? ? ? ?| ?Stage two                           



                          ? ? ? ? ?| ? Decreased                           



                          GFR ? ? ? ?                            



                          +---------------------                           



                          --+-------------------                           



                          --+-------------------                           



                          ------+| ?30-59 ? ? ?                           



                          ? ? ? ? ?| ?Stage                           



                          three ? ? ? ?| ? Stage                           



                          three ? ? ? ? ?                           



                          +---------------------                           



                          --+-------------------                           



                          --+-------------------                           



                          ------+| ?15-29 ? ? ?                           



                          ? ? ? ? ?| ?Stage four                           



                          ? ? ? ? | ? Stage four                           



                          ? ? ? ? ?                              



                          ?+--------------------                           



                          ---+------------------                           



                          ---+------------------                           



                          -------+| ?<15 (or                           



                          dialysis) ? ?| ?Stage                           



                          five ? ? ? ? | ? Stage                           



                          five ? ? ? ? ?                           



                          ?+--------------------                           



                          ---+------------------                           



                          ---+------------------                           



                          -------+ *Each stage                           



                          assumes the associated                           



                          GFR level has been in                           



                          effect for at least                           



                          three months. ?Stages                           



                          1 to 5, with or                           



                          without kidney                           



                          disease, indicate                           



                          chronic kidney                           



                          disease. Notes:                           



                          Determination of                           



                          stages one and two                           



                          (with eGFR                             



                          >59mL/min/1.73 m2)                           



                          requires estimation of                           



                          kidney damage for at                           



                          least three months as                           



                          defined by structural                           



                          or functional                           



                          abnormalities of the                           



                          kidney, manifested by                           



                          either:Pathological                           



                          abnormalities or                           



                          Markers of kidney                           



                          damage (including                           



                          abnormalities in the                           



                          composition of the                           



                          blood or urine or                           



                          abnormalities in                           



                          imaging tests).                           

 

             Lab Interpretation Abnormal                               



             (test code = 24616-8)                                        



[facility] with [id number] 12:23:51





             Test Item    Value        Reference Range Interpretation Comments

 

             WBC (test code =              See_Comment                [Automated



             7190-2)                                             message] The sy

stem



                                                                 which generated



                                                                 this result



                                                                 transmitted



                                                                 reference range

:



                                                                 4.20 - 10.70



                                                                 10*3/?L. The



                                                                 reference range

 was



                                                                 not used to



                                                                 interpret this



                                                                 result as



                                                                 normal/abnormal

.

 

             RBC (test code =              See_Comment                [Automated



             679-8)                                              message] The sy

stem



                                                                 which generated



                                                                 this result



                                                                 transmitted



                                                                 reference range

:



                                                                 4.26 - 5.52



                                                                 10*6/?L. The



                                                                 reference range

 was



                                                                 not used to



                                                                 interpret this



                                                                 result as



                                                                 normal/abnormal

.

 

             HGB (test code = 14.9 g/dL    12.2-16.4                 



             718-7)                                              

 

             HCT (test code = 44.2 %       38.4-49.3                 



             4544-3)                                             

 

             MCV (test code = 88.4 fL      81.7-95.6                 



             787-2)                                              

 

             MCH (test code = 29.8 pg      26.1-32.7                 



             785-6)                                              

 

             MCHC (test code = 33.7 g/dL    31.2-35.0                 



             786-4)                                              

 

             RDW-SD (test code = 49.3 fL      38.5-51.6                 



             58131-0)                                            

 

             RDW-CV (test code = 15.3 %       12.1-15.4                 



             788-0)                                              

 

             PLT (test code =              See_Comment  H             [Automated



             777-3)                                              message] The sy

stem



                                                                 which generated



                                                                 this result



                                                                 transmitted



                                                                 reference range

:



                                                                 150 - 328 10*3/

?L.



                                                                 The reference r

zhen



                                                                 was not used to



                                                                 interpret this



                                                                 result as



                                                                 normal/abnormal

.

 

             MPV (test code = 10.2 fL      9.8-13.0                  



             05634-4)                                            

 

             NRBC/100 WBC (test              See_Comment                [Automat

ed



             code = 3835239426)                                        message] 

The system



                                                                 which generated



                                                                 this result



                                                                 transmitted



                                                                 reference range

:



                                                                 0.0 - 10.0 /100



                                                                 WBCs. The refer

ence



                                                                 range was not u

sed



                                                                 to interpret th

is



                                                                 result as



                                                                 normal/abnormal

.

 

             NRBC x10^3 (test code <0.01        See_Comment                [Auto

mated



             = [id number])                                        message] The s

ystem



                                                                 which generated



                                                                 this result



                                                                 transmitted



                                                                 reference range

:



                                                                 10*3/?L. The



                                                                 reference range

 was



                                                                 not used to



                                                                 interpret this



                                                                 result as



                                                                 normal/abnormal

.

 

             GRAN MAT (NEUT) % 56.7 %                                 



             (test code = 770-8)                                        

 

             IMM GRAN % (test code 0.60 %                                 



             = 9878279937)                                        

 

             LYMPH % (test code = 31.1 %                                 



             736-9)                                              

 

             MONO % (test code = 8.7 %                                  



             5905-5)                                             

 

             EOS % (test code = 2.4 %                                  



             713-8)                                              

 

             BASO % (test code = 0.5 %                                  



             706-2)                                              

 

             GRAN MAT x10^3(ANC) 4.83 10*3/uL 1.99-6.95                 



             (test code =                                        



             8314399204)                                         

 

             IMM GRAN x10^3 (test 0.05 10*3/uL 0.00-0.06                 



             code = 3853898998)                                        

 

             LYMPH x10^3 (test code 2.64 10*3/uL 1.09-3.23                 



             = 731-0)                                            

 

             MONO x10^3 (test code 0.74 10*3/uL 0.36-1.02                 



             = 742-7)                                            

 

             EOS x10^3 (test code = 0.20 10*3/uL 0.06-0.53                 



             711-2)                                              

 

             BASO x10^3 (test code 0.04 10*3/uL 0.01-0.09                 



             = 704-7)                                            

 

             Lab Interpretation Abnormal                               



             (test code = 18466-4)                                        



[id number]





             Test Item    Value        Reference Range Interpretation Comments

 

             NA (test code = 139 mmol/L   135-145                   



             5264658001)                                         

 

             K (test code = 4.5 mmol/L   3.5-5.0                   



             8289283567)                                         

 

             CL (test code = 102 mmol/L                       



             5266368938)                                         

 

             CO2 TOTAL (test code = 26 mmol/L    23-31                     



             1765064189)                                         

 

             AGAP (test code =              2-16                      



             9232182193)                                         

 

             BUN (test code = 16 mg/dL     7-23                      



             0518678029)                                         

 

             GLUCOSE (test code = 99 mg/dL                         



             7203450573)                                         

 

             CREATININE (test code = 0.83 mg/dL   0.60-1.25                 



             2705314261)                                         

 

             TOTAL BILI (test code = 0.6 mg/dL    0.1-1.1                   



             5536493663)                                         

 

             CALCIUM (test code = 9.5 mg/dL    8.6-10.6                  



             3201528238)                                         

 

             T PROTEIN (test code = 8.6 g/dL     6.3-8.2      H            



             7698832206)                                         

 

             ALBUMIN (test code = 4.6 g/dL     3.5-5.0                   



             9205710713)                                         

 

             ALK PHOS (test code = 121 U/L                          



             4606943475)                                         

 

             ALTv (test code = 58 U/L       5-50         H            



             1742-6)                                             

 

             AST(SGOT) (test code = 32 U/L       13-40                     



             0492582778)                                         

 

             eGFR (test code =              mL/min/1.73m2              



             2496038844)                                         

 

             MAL (test code = MAL) Association of                           



                          Glomerular Filtration                           



                          Rate (GFR) and Staging                           



                          of Kidney Disease*                           



                          +---------------------                           



                          --+-------------------                           



                          --+-------------------                           



                          ------+| GFR                           



                          (mL/min/1.73 m2) ?|                           



                          With Kidney Damage ?|                           



                          ?Without Kidney                           



                          Damage+---------------                           



                          --------+-------------                           



                          --------+-------------                           



                          ------------+| ?>90 ?                           



                          ? ? ? ? ? ? ? ?|                           



                          ?Stage one ? ? ? ? ?|                           



                          ? Normal ? ? ? ? ? ? ?                           



                          ?+--------------------                           



                          ---+------------------                           



                          ---+------------------                           



                          -------+| ?60-89 ? ? ?                           



                          ? ? ? ? ?| ?Stage two                           



                          ? ? ? ? ?| ? Decreased                           



                          GFR ? ? ? ?                            



                          +---------------------                           



                          --+-------------------                           



                          --+-------------------                           



                          ------+| ?30-59 ? ? ?                           



                          ? ? ? ? ?| ?Stage                           



                          three ? ? ? ?| ? Stage                           



                          three ? ? ? ? ?                           



                          +---------------------                           



                          --+-------------------                           



                          --+-------------------                           



                          ------+| ?15-29 ? ? ?                           



                          ? ? ? ? ?| ?Stage four                           



                          ? ? ? ? | ? Stage four                           



                          ? ? ? ? ?                              



                          ?+--------------------                           



                          ---+------------------                           



                          ---+------------------                           



                          -------+| ?<15 (or                           



                          dialysis) ? ?| ?Stage                           



                          five ? ? ? ? | ? Stage                           



                          five ? ? ? ? ?                           



                          ?+--------------------                           



                          ---+------------------                           



                          ---+------------------                           



                          -------+ *Each stage                           



                          assumes the associated                           



                          GFR level has been in                           



                          effect for at least                           



                          three months. ?Stages                           



                          1 to 5, with or                           



                          without kidney                           



                          disease, indicate                           



                          chronic kidney                           



                          disease. Notes:                           



                          Determination of                           



                          stages one and two                           



                          (with eGFR                             



                          >59mL/min/1.73 m2)                           



                          requires estimation of                           



                          kidney damage for at                           



                          least three months as                           



                          defined by structural                           



                          or functional                           



                          abnormalities of the                           



                          kidney, manifested by                           



                          either:Pathological                           



                          abnormalities or                           



                          Markers of kidney                           



                          damage (including                           



                          abnormalities in the                           



                          composition of the                           



                          blood or urine or                           



                          abnormalities in                           



                          imaging tests).                           

 

             Lab Interpretation Abnormal                               



             (test code = 47745-4)                                        



[facility] WITH [id number] 05:48:31





             Test Item    Value        Reference Range Interpretation Comments

 

             WBC (test code =              See_Comment  H             [Automated



             6690-2)                                             message] The sy

stem



                                                                 which generated



                                                                 this result



                                                                 transmitted



                                                                 reference range

:



                                                                 4.20 - 10.70



                                                                 10*3/?L. The



                                                                 reference range

 was



                                                                 not used to



                                                                 interpret this



                                                                 result as



                                                                 normal/abnormal

.

 

             RBC (test code =              See_Comment  H             [Automated



             789-8)                                              message] The sy

stem



                                                                 which generated



                                                                 this result



                                                                 transmitted



                                                                 reference range

:



                                                                 4.26 - 5.52



                                                                 10*6/?L. The



                                                                 reference range

 was



                                                                 not used to



                                                                 interpret this



                                                                 result as



                                                                 normal/abnormal

.

 

             HGB (test code = 17.3 g/dL    12.2-16.4    H            



             718-7)                                              

 

             HCT (test code = 51.4 %       38.4-49.3    H            



             4544-3)                                             

 

             MCV (test code = 87.7 fL      81.7-95.6                 



             787-2)                                              

 

             MCH (test code = 29.5 pg      26.1-32.7                 



             785-6)                                              

 

             MCHC (test code = 33.7 g/dL    31.2-35.0                 



             786-4)                                              

 

             RDW-SD (test code = 49.1 fL      38.5-51.6                 



             95668-3)                                            

 

             RDW-CV (test code = 15.5 %       12.1-15.4    H            



             788-0)                                              

 

             PLT (test code =              See_Comment  H             [Automated



             777-3)                                              message] The sy

stem



                                                                 which generated



                                                                 this result



                                                                 transmitted



                                                                 reference range

:



                                                                 150 - 328 10*3/

?L.



                                                                 The reference r

zhen



                                                                 was not used to



                                                                 interpret this



                                                                 result as



                                                                 normal/abnormal

.

 

             MPV (test code = 10.4 fL      9.8-13.0                  



             04712-1)                                            

 

             NRBC/100 WBC (test              See_Comment                [Automat

ed



             code = 0779882745)                                        message] 

The system



                                                                 which generated



                                                                 this result



                                                                 transmitted



                                                                 reference range

:



                                                                 0.0 - 10.0 /100



                                                                 WBCs. The refer

ence



                                                                 range was not u

sed



                                                                 to interpret th

is



                                                                 result as



                                                                 normal/abnormal

.

 

             NRBC x10^3 (test code <0.01        See_Comment                [Auto

mated



             = [id number])                                        message] The s

ystem



                                                                 which generated



                                                                 this result



                                                                 transmitted



                                                                 reference range

:



                                                                 10*3/?L. The



                                                                 reference range

 was



                                                                 not used to



                                                                 interpret this



                                                                 result as



                                                                 normal/abnormal

.

 

             GRAN MAT (NEUT) % 64.8 %                                 



             (test code = 770-8)                                        

 

             IMM GRAN % (test code 0.70 %                                 



             = 9390697599)                                        

 

             LYMPH % (test code = 25.2 %                                 



             736-9)                                              

 

             MONO % (test code = 6.9 %                                  



             5905-5)                                             

 

             EOS % (test code = 1.9 %                                  



             713-8)                                              

 

             BASO % (test code = 0.5 %                                  



             706-2)                                              

 

             GRAN MAT x10^3(ANC) 7.80 10*3/uL 1.99-6.95    H            



             (test code =                                        



             6407538242)                                         

 

             IMM GRAN x10^3 (test 0.08 10*3/uL 0.00-0.06    H            



             code = 6426201815)                                        

 

             LYMPH x10^3 (test code 3.04 10*3/uL 1.09-3.23                 



             = 731-0)                                            

 

             MONO x10^3 (test code 0.83 10*3/uL 0.36-1.02                 



             = 742-7)                                            

 

             EOS x10^3 (test code = 0.23 10*3/uL 0.06-0.53                 



             711-2)                                              

 

             BASO x10^3 (test code 0.06 10*3/uL 0.01-0.09                 



             = 704-7)                                            

 

             Lab Interpretation Abnormal                               



             (test code = 56120-6)                                        



[facility]. METABOLIC PANEL ([id number] 
02:19:08





             Test Item    Value        Reference Range Interpretation Comments

 

             NA (test code = 136 mmol/L   135-145                   



             5087181892)                                         

 

             K (test code = 4.4 mmol/L   3.5-5.0                   



             3911438849)                                         

 

             CL (test code = 99 mmol/L                        



             0733424249)                                         

 

             CO2 TOTAL (test code = 25 mmol/L    23-31                     



             7384717180)                                         

 

             AGAP (test code =              2-16                      



             7115906225)                                         

 

             BUN (test code = 19 mg/dL     7-23                      



             6823378845)                                         

 

             GLUCOSE (test code = 103 mg/dL                        



             8360828181)                                         

 

             CREATININE (test code = 0.70 mg/dL   0.60-1.25                 



             5532303877)                                         

 

             TOTAL BILI (test code = 0.7 mg/dL    0.1-1.1                   



             3250019140)                                         

 

             CALCIUM (test code = 9.2 mg/dL    8.6-10.6                  



             9511071367)                                         

 

             T PROTEIN (test code = 9.4 g/dL     6.3-8.2      H            



             4543082645)                                         

 

             ALBUMIN (test code = 4.8 g/dL     3.5-5.0                   



             4732768973)                                         

 

             ALK PHOS (test code = 146 U/L             H            



             6895256409)                                         

 

             ALTv (test code = 75 U/L       5-50         H            



             1742-6)                                             

 

             AST(SGOT) (test code = 37 U/L       13-40                     



             0492611164)                                         

 

             eGFR (test code =              mL/min/1.73m2              



             8020920784)                                         

 

             MAL (test code = MAL) Association of                           



                          Glomerular Filtration                           



                          Rate (GFR) and Staging                           



                          of Kidney Disease*                           



                          +---------------------                           



                          --+-------------------                           



                          --+-------------------                           



                          ------+| GFR                           



                          (mL/min/1.73 m2) ?|                           



                          With Kidney Damage ?|                           



                          ?Without Kidney                           



                          Damage+---------------                           



                          --------+-------------                           



                          --------+-------------                           



                          ------------+| ?>90 ?                           



                          ? ? ? ? ? ? ? ?|                           



                          ?Stage one ? ? ? ? ?|                           



                          ? Normal ? ? ? ? ? ? ?                           



                          ?+--------------------                           



                          ---+------------------                           



                          ---+------------------                           



                          -------+| ?60-89 ? ? ?                           



                          ? ? ? ? ?| ?Stage two                           



                          ? ? ? ? ?| ? Decreased                           



                          GFR ? ? ? ?                            



                          +---------------------                           



                          --+-------------------                           



                          --+-------------------                           



                          ------+| ?30-59 ? ? ?                           



                          ? ? ? ? ?| ?Stage                           



                          three ? ? ? ?| ? Stage                           



                          three ? ? ? ? ?                           



                          +---------------------                           



                          --+-------------------                           



                          --+-------------------                           



                          ------+| ?15-29 ? ? ?                           



                          ? ? ? ? ?| ?Stage four                           



                          ? ? ? ? | ? Stage four                           



                          ? ? ? ? ?                              



                          ?+--------------------                           



                          ---+------------------                           



                          ---+------------------                           



                          -------+| ?<15 (or                           



                          dialysis) ? ?| ?Stage                           



                          five ? ? ? ? | ? Stage                           



                          five ? ? ? ? ?                           



                          ?+--------------------                           



                          ---+------------------                           



                          ---+------------------                           



                          -------+ *Each stage                           



                          assumes the associated                           



                          GFR level has been in                           



                          effect for at least                           



                          three months. ?Stages                           



                          1 to 5, with or                           



                          without kidney                           



                          disease, indicate                           



                          chronic kidney                           



                          disease. Notes:                           



                          Determination of                           



                          stages one and two                           



                          (with eGFR                             



                          >59mL/min/1.73 m2)                           



                          requires estimation of                           



                          kidney damage for at                           



                          least three months as                           



                          defined by structural                           



                          or functional                           



                          abnormalities of the                           



                          kidney, manifested by                           



                          either:Pathological                           



                          abnormalities or                           



                          Markers of kidney                           



                          damage (including                           



                          abnormalities in the                           



                          composition of the                           



                          blood or urine or                           



                          abnormalities in                           



                          imaging tests).                           

 

             Lab Interpretation Abnormal                               



             (test code = 90379-2)                                        



[facility]





             Test Item    Value        Reference Range Interpretation Comments

 

             LIPASE (test code = 6785921628) 86 U/L       0-220                 

    

 

             Lab Interpretation (test code = Normal                             

    



             45116-2)                                            



[facility] WITH [id number] 01:55:49





             Test Item    Value        Reference Range Interpretation Comments

 

             WBC (test code =              See_Comment  H             [Automated



             6690-2)                                             message] The sy

stem



                                                                 which generated



                                                                 this result



                                                                 transmitted



                                                                 reference range

:



                                                                 4.20 - 10.70



                                                                 10*3/?L. The



                                                                 reference range

 was



                                                                 not used to



                                                                 interpret this



                                                                 result as



                                                                 normal/abnormal

.

 

             RBC (test code =              See_Comment  H             [Automated



             789-8)                                              message] The sy

stem



                                                                 which generated



                                                                 this result



                                                                 transmitted



                                                                 reference range

:



                                                                 4.26 - 5.52



                                                                 10*6/?L. The



                                                                 reference range

 was



                                                                 not used to



                                                                 interpret this



                                                                 result as



                                                                 normal/abnormal

.

 

             HGB (test code = 18.0 g/dL    12.2-16.4    H            



             718-7)                                              

 

             HCT (test code = 53.9 %       38.4-49.3    H            



             4544-3)                                             

 

             MCV (test code = 87.2 fL      81.7-95.6                 



             787-2)                                              

 

             MCH (test code = 29.1 pg      26.1-32.7                 



             785-6)                                              

 

             MCHC (test code = 33.4 g/dL    31.2-35.0                 



             786-4)                                              

 

             RDW-SD (test code = 48.7 fL      38.5-51.6                 



             55671-5)                                            

 

             RDW-CV (test code = 15.4 %       12.1-15.4                 



             788-0)                                              

 

             PLT (test code =              See_Comment  H             [Automated



             777-3)                                              message] The sy

stem



                                                                 which generated



                                                                 this result



                                                                 transmitted



                                                                 reference range

:



                                                                 150 - 328 10*3/

?L.



                                                                 The reference r

zhen



                                                                 was not used to



                                                                 interpret this



                                                                 result as



                                                                 normal/abnormal

.

 

             MPV (test code = 9.9 fL       9.8-13.0                  



             23908-3)                                            

 

             NRBC/100 WBC (test              See_Comment                [Automat

ed



             code = 9363018315)                                        message] 

The system



                                                                 which generated



                                                                 this result



                                                                 transmitted



                                                                 reference range

:



                                                                 0.0 - 10.0 /100



                                                                 WBCs. The refer

ence



                                                                 range was not u

sed



                                                                 to interpret th

is



                                                                 result as



                                                                 normal/abnormal

.

 

             NRBC x10^3 (test code <0.01        See_Comment                [Auto

mated



             = 3276896364)                                        message] The s

ystem



                                                                 which generated



                                                                 this result



                                                                 transmitted



                                                                 reference range

:



                                                                 10*3/?L. The



                                                                 reference range

 was



                                                                 not used to



                                                                 interpret this



                                                                 result as



                                                                 normal/abnormal

.

 

             GRAN MAT (NEUT) % 69.8 %                                 



             (test code = 770-8)                                        

 

             IMM GRAN % (test code 0.50 %                                 



             = 4983978435)                                        

 

             LYMPH % (test code = 20.5 %                                 



             736-9)                                              

 

             MONO % (test code = 6.8 %                                  



             5905-5)                                             

 

             EOS % (test code = 1.9 %                                  



             713-8)                                              

 

             BASO % (test code = 0.5 %                                  



             706-2)                                              

 

             GRAN MAT x10^3(ANC) 7.72 10*3/uL 1.99-6.95    H            



             (test code =                                        



             8031160609)                                         

 

             IMM GRAN x10^3 (test 0.05 10*3/uL 0.00-0.06                 



             code = 7488821861)                                        

 

             LYMPH x10^3 (test code 2.26 10*3/uL 1.09-3.23                 



             = 731-0)                                            

 

             MONO x10^3 (test code 0.75 10*3/uL 0.36-1.02                 



             = 742-7)                                            

 

             EOS x10^3 (test code = 0.21 10*3/uL 0.06-0.53                 



             711-2)                                              

 

             BASO x10^3 (test code 0.06 10*3/uL 0.01-0.09                 



             = 704-7)                                            

 

             Lab Interpretation Abnormal                               



             (test code = 53461-1)                                        



[id number]52





             Test Item    Value        Reference    Interpretation Comments



                                       Range                     

 

             D-DIMER (test code =              See_Comment                [Autom

ated



             2137500885)                                         message] The



                                                                 system which



                                                                 generated this



                                                                 result



                                                                 transmitted



                                                                 reference range

:



                                                                 <0.41 ?g/mL



                                                                 (FEU). The



                                                                 reference range



                                                                 was not used to



                                                                 interpret this



                                                                 result as



                                                                 normal/abnormal

.

 

             MAL (test code = This test may be                           



             MAL)         used in conjunction                           



                          with a clinical                           



                          pretest probability                           



                          (PTP) assessment                           



                          model to exclude                           



                          venous                                 



                          thromboembolism                           



                          (VTE) in patients                           



                          suspected of deep                           



                          venous thrombosis                           



                          (DVT) and pulmonary                           



                          embolism (PE) A                           



                          D-Dimer value less                           



                          than 0.50 ?g/ml                           



                          (FEU) has a negative                           



                          predicative value of                           



                          96 to 100% (95%                           



                          CI)and 97 to 100%                           



                          (95% CI) as an aid                           



                          in the diagnosis of                           



                          deep vein thrombosis                           



                          (DVT) and pulmonary                           



                          embolism when there                           



                          is low or moderate                           



                          pretest probability                           



                          of PE or DVT.                           



                          D-Dimer values are                           



                          expressed in initial                           



                          fibrinogen                             



                          equivalent units                           



                          (FEU)" The assay                           



                          results should be                           



                          used with other                           



                          information,                           



                          including the                           



                          clinical context, in                           



                          forming a diagnosis.                           

 

             Lab Interpretation Normal                                 



             (test code =                                        



             43955-9)                                            



[facility]. METABOLIC PANEL ([id number])2022-01-15 
22:42:48





             Test Item    Value        Reference Range Interpretation Comments

 

             NA (test code = 135 mmol/L   135-145                   



             7102540902)                                         

 

             K (test code = 4.6 mmol/L   3.5-5.0                   



             5406164948)                                         

 

             CL (test code = 102 mmol/L                       



             3683500547)                                         

 

             CO2 TOTAL (test code = 24 mmol/L    23-31                     



             0144223477)                                         

 

             AGAP (test code =              2-16                      



             9275891767)                                         

 

             BUN (test code = 17 mg/dL     7-23                      



             6925373482)                                         

 

             GLUCOSE (test code = 107 mg/dL                        



             8726842581)                                         

 

             CREATININE (test code = 0.60 mg/dL   0.60-1.25                 



             9508153073)                                         

 

             TOTAL BILI (test code = 1.0 mg/dL    0.1-1.1                   



             2959855887)                                         

 

             CALCIUM (test code = 9.4 mg/dL    8.6-10.6                  



             0878563172)                                         

 

             T PROTEIN (test code = 8.6 g/dL     6.3-8.2      H            



             4848098881)                                         

 

             ALBUMIN (test code = 4.6 g/dL     3.5-5.0                   



             2551344164)                                         

 

             ALK PHOS (test code = 159 U/L             H            



             2952085251)                                         

 

             ALTv (test code = 77 U/L       5-50         H            



             2-6)                                             

 

             AST(SGOT) (test code = 38 U/L       13-40                     



             8320199736)                                         

 

             eGFR (test code =              mL/min/1.73m2              



             9653188814)                                         

 

             MAL (test code = MAL) Association of                           



                          Glomerular Filtration                           



                          Rate (GFR) and Staging                           



                          of Kidney Disease*                           



                          +---------------------                           



                          --+-------------------                           



                          --+-------------------                           



                          ------+| GFR                           



                          (mL/min/1.73 m2) ?|                           



                          With Kidney Damage ?|                           



                          ?Without Kidney                           



                          Damage+---------------                           



                          --------+-------------                           



                          --------+-------------                           



                          ------------+| ?>90 ?                           



                          ? ? ? ? ? ? ? ?|                           



                          ?Stage one ? ? ? ? ?|                           



                          ? Normal ? ? ? ? ? ? ?                           



                          ?+--------------------                           



                          ---+------------------                           



                          ---+------------------                           



                          -------+| ?60-89 ? ? ?                           



                          ? ? ? ? ?| ?Stage two                           



                          ? ? ? ? ?| ? Decreased                           



                          GFR ? ? ? ?                            



                          +---------------------                           



                          --+-------------------                           



                          --+-------------------                           



                          ------+| ?30-59 ? ? ?                           



                          ? ? ? ? ?| ?Stage                           



                          three ? ? ? ?| ? Stage                           



                          three ? ? ? ? ?                           



                          +---------------------                           



                          --+-------------------                           



                          --+-------------------                           



                          ------+| ?15-29 ? ? ?                           



                          ? ? ? ? ?| ?Stage four                           



                          ? ? ? ? | ? Stage four                           



                          ? ? ? ? ?                              



                          ?+--------------------                           



                          ---+------------------                           



                          ---+------------------                           



                          -------+| ?<15 (or                           



                          dialysis) ? ?| ?Stage                           



                          five ? ? ? ? | ? Stage                           



                          five ? ? ? ? ?                           



                          ?+--------------------                           



                          ---+------------------                           



                          ---+------------------                           



                          -------+ *Each stage                           



                          assumes the associated                           



                          GFR level has been in                           



                          effect for at least                           



                          three months. ?Stages                           



                          1 to 5, with or                           



                          without kidney                           



                          disease, indicate                           



                          chronic kidney                           



                          disease. Notes:                           



                          Determination of                           



                          stages one and two                           



                          (with eGFR                             



                          >59mL/min/1.73 m2)                           



                          requires estimation of                           



                          kidney damage for at                           



                          least three months as                           



                          defined by structural                           



                          or functional                           



                          abnormalities of the                           



                          kidney, manifested by                           



                          either:Pathological                           



                          abnormalities or                           



                          Markers of kidney                           



                          damage (including                           



                          abnormalities in the                           



                          composition of the                           



                          blood or urine or                           



                          abnormalities in                           



                          imaging tests).                           

 

             Lab Interpretation Abnormal                               



             (test code = 41932-9)                                        



[facility] WITH DIFF2022-01-15 22:30:27





             Test Item    Value        Reference Range Interpretation Comments

 

             WBC (test code =              See_Comment  H             [Automated



             0190-2)                                             message] The



                                                                 system which



                                                                 generated this



                                                                 result transmit

huma



                                                                 reference range

:



                                                                 4.20 - 10.70



                                                                 10*3/?L. The



                                                                 reference range



                                                                 was not used to



                                                                 interpret this



                                                                 result as



                                                                 normal/abnormal

.

 

             RBC (test code =              See_Comment  H             [Automated



             789-8)                                              message] The



                                                                 system which



                                                                 generated this



                                                                 result transmit

huma



                                                                 reference range

:



                                                                 4.26 - 5.52



                                                                 10*6/?L. The



                                                                 reference range



                                                                 was not used to



                                                                 interpret this



                                                                 result as



                                                                 normal/abnormal

.

 

             HGB (test code = 17.5 g/dL    12.2-16.4    H            



             718-7)                                              

 

             HCT (test code = 51.0 %       38.4-49.3    H            



             4544-3)                                             

 

             MCV (test code = 86.7 fL      81.7-95.6                 



             787-2)                                              

 

             MCH (test code = 29.8 pg      26.1-32.7                 



             785-6)                                              

 

             MCHC (test code = 34.3 g/dL    31.2-35.0                 



             786-4)                                              

 

             RDW-SD (test code = 48.0 fL      38.5-51.6                 



             86412-5)                                            

 

             RDW-CV (test code = 15.1 %       12.1-15.4                 



             788-0)                                              

 

             PLT (test code =              See_Comment  H             [Automated



             777-3)                                              message] The



                                                                 system which



                                                                 generated this



                                                                 result transmit

huma



                                                                 reference range

:



                                                                 150 - 328 10*3/

?L.



                                                                 The reference



                                                                 range was not u

sed



                                                                 to interpret th

is



                                                                 result as



                                                                 normal/abnormal

.

 

             MPV (test code = 10.3 fL      9.8-13.0                  



             91976-8)                                            

 

             NRBC/100 WBC (test              See_Comment                [Automat

ed



             code = 8902044371)                                        message] 

The



                                                                 system which



                                                                 generated this



                                                                 result transmit

huma



                                                                 reference range

:



                                                                 0.0 - 10.0 /100



                                                                 WBCs. The



                                                                 reference range



                                                                 was not used to



                                                                 interpret this



                                                                 result as



                                                                 normal/abnormal

.

 

             NRBC x10^3 (test code <0.01        See_Comment                [Auto

mated



             = 2723092287)                                        message] The



                                                                 system which



                                                                 generated this



                                                                 result transmit

huma



                                                                 reference range

:



                                                                 10*3/?L. The



                                                                 reference range



                                                                 was not used to



                                                                 interpret this



                                                                 result as



                                                                 normal/abnormal

.

 

             GRAN MAT (NEUT) % 79.9 %                                 



             (test code = 770-8)                                        

 

             IMM GRAN % (test code 0.50 %                                 



             = 1136046489)                                        

 

             LYMPH % (test code = 11.8 %                                 



             736-9)                                              

 

             MONO % (test code = 6.6 %                                  



             5905-5)                                             

 

             EOS % (test code = 0.9 %                                  



             713-8)                                              

 

             BASO % (test code = 0.3 %                                  



             706-2)                                              

 

             GRAN MAT x10^3(ANC) 10.70 10*3/uL 1.99-6.95    H            



             (test code =                                        



             6939091042)                                         

 

             IMM GRAN x10^3 (test 0.07 10*3/uL 0.00-0.06    H            



             code = 3687241613)                                        

 

             LYMPH x10^3 (test code 1.58 10*3/uL 1.09-3.23                 



             = 731-0)                                            

 

             MONO x10^3 (test code 0.89 10*3/uL 0.36-1.02                 



             = 742-7)                                            

 

             EOS x10^3 (test code = 0.12 10*3/uL 0.06-0.53                 



             711-2)                                              

 

             BASO x10^3 (test code 0.04 10*3/uL 0.01-0.09                 



             = 704-7)                                            

 

             Lab Interpretation Abnormal                               



             (test code = 37491-5)                                        



[facility]. METABOLIC PANEL ([id number])2021 
23:02:15





             Test Item    Value        Reference Range Interpretation Comments

 

             NA (test code = 137 mmol/L   135-145                   



             8101304399)                                         

 

             K (test code = 4.5 mmol/L   3.5-5.0                   



             7814407695)                                         

 

             CL (test code = 109 mmol/L          H            



             2142665219)                                         

 

             CO2 TOTAL (test code = 22 mmol/L    23-31        L            



             8655426353)                                         

 

             AGAP (test code =              2-16                      



             6160492483)                                         

 

             BUN (test code = 7 mg/dL      7-23                      



             1894762948)                                         

 

             GLUCOSE (test code = 87 mg/dL                         



             0066038251)                                         

 

             CREATININE (test code = 0.61 mg/dL   0.60-1.25                 



             5160672753)                                         

 

             TOTAL BILI (test code = 0.5 mg/dL    0.1-1.1                   



             8609434366)                                         

 

             CALCIUM (test code = 9.0 mg/dL    8.6-10.6                  



             2000508051)                                         

 

             T PROTEIN (test code = 6.9 g/dL     6.3-8.2                   



             2165975371)                                         

 

             ALBUMIN (test code = 3.5 g/dL     3.5-5.0                   



             0780866258)                                         

 

             ALK PHOS (test code = 112 U/L                          



             0246075572)                                         

 

             ALTv (test code = 35 U/L       5-50                      



             1742-6)                                             

 

             AST(SGOT) (test code = 21 U/L       13-40                     



             6480339800)                                         

 

             eGFR (test code =              mL/min/1.73m2              



             5752672478)                                         

 

             MAL (test code = MAL) Association of                           



                          Glomerular Filtration                           



                          Rate (GFR) and Staging                           



                          of Kidney Disease*                           



                          +---------------------                           



                          --+-------------------                           



                          --+-------------------                           



                          ------+| GFR                           



                          (mL/min/1.73 m2) ?|                           



                          With Kidney Damage ?|                           



                          ?Without Kidney                           



                          Damage+---------------                           



                          --------+-------------                           



                          --------+-------------                           



                          ------------+| ?>90 ?                           



                          ? ? ? ? ? ? ? ?|                           



                          ?Stage one ? ? ? ? ?|                           



                          ? Normal ? ? ? ? ? ? ?                           



                          ?+--------------------                           



                          ---+------------------                           



                          ---+------------------                           



                          -------+| ?60-89 ? ? ?                           



                          ? ? ? ? ?| ?Stage two                           



                          ? ? ? ? ?| ? Decreased                           



                          GFR ? ? ? ?                            



                          +---------------------                           



                          --+-------------------                           



                          --+-------------------                           



                          ------+| ?30-59 ? ? ?                           



                          ? ? ? ? ?| ?Stage                           



                          three ? ? ? ?| ? Stage                           



                          three ? ? ? ? ?                           



                          +---------------------                           



                          --+-------------------                           



                          --+-------------------                           



                          ------+| ?15-29 ? ? ?                           



                          ? ? ? ? ?| ?Stage four                           



                          ? ? ? ? | ? Stage four                           



                          ? ? ? ? ?                              



                          ?+--------------------                           



                          ---+------------------                           



                          ---+------------------                           



                          -------+| ?<15 (or                           



                          dialysis) ? ?| ?Stage                           



                          five ? ? ? ? | ? Stage                           



                          five ? ? ? ? ?                           



                          ?+--------------------                           



                          ---+------------------                           



                          ---+------------------                           



                          -------+ *Each stage                           



                          assumes the associated                           



                          GFR level has been in                           



                          effect for at least                           



                          three months. ?Stages                           



                          1 to 5, with or                           



                          without kidney                           



                          disease, indicate                           



                          chronic kidney                           



                          disease. Notes:                           



                          Determination of                           



                          stages one and two                           



                          (with eGFR                             



                          >59mL/min/1.73 m2)                           



                          requires estimation of                           



                          kidney damage for at                           



                          least three months as                           



                          defined by structural                           



                          or functional                           



                          abnormalities of the                           



                          kidney, manifested by                           



                          either:Pathological                           



                          abnormalities or                           



                          Markers of kidney                           



                          damage (including                           



                          abnormalities in the                           



                          composition of the                           



                          blood or urine or                           



                          abnormalities in                           



                          imaging tests).                           

 

             Lab Interpretation Abnormal                               



             (test code = 81584-8)                                        



[facility] WITH [id number] 22:51:57





             Test Item    Value        Reference Range Interpretation Comments

 

             WBC (test code =              See_Comment  H             [Automated



             9375-2)                                             message] The sy

stem



                                                                 which generated



                                                                 this result



                                                                 transmitted



                                                                 reference range

:



                                                                 4.20 - 10.70



                                                                 10*3/?L. The



                                                                 reference range

 was



                                                                 not used to



                                                                 interpret this



                                                                 result as



                                                                 normal/abnormal

.

 

             RBC (test code =              See_Comment                [Automated



             439-8)                                              message] The sy

stem



                                                                 which generated



                                                                 this result



                                                                 transmitted



                                                                 reference range

:



                                                                 4.26 - 5.52



                                                                 10*6/?L. The



                                                                 reference range

 was



                                                                 not used to



                                                                 interpret this



                                                                 result as



                                                                 normal/abnormal

.

 

             HGB (test code = 14.7 g/dL    12.2-16.4                 



             718-7)                                              

 

             HCT (test code = 43.7 %       38.4-49.3                 



             4544-3)                                             

 

             MCV (test code = 85.9 fL      81.7-95.6                 



             787-2)                                              

 

             MCH (test code = 28.9 pg      26.1-32.7                 



             785-6)                                              

 

             MCHC (test code = 33.6 g/dL    31.2-35.0                 



             786-4)                                              

 

             RDW-SD (test code = 42.4 fL      38.5-51.6                 



             37613-6)                                            

 

             RDW-CV (test code = 13.6 %       12.1-15.4                 



             788-0)                                              

 

             PLT (test code =              See_Comment  H             [Automated



             777-3)                                              message] The sy

stem



                                                                 which generated



                                                                 this result



                                                                 transmitted



                                                                 reference range

:



                                                                 150 - 328 10*3/

?L.



                                                                 The reference r

zhen



                                                                 was not used to



                                                                 interpret this



                                                                 result as



                                                                 normal/abnormal

.

 

             MPV (test code = 9.4 fL       9.8-13.0     L            



             00208-6)                                            

 

             NRBC/100 WBC (test              See_Comment                [Automat

ed



             code = [id number])                                        message] 

The system



                                                                 which generated



                                                                 this result



                                                                 transmitted



                                                                 reference range

:



                                                                 0.0 - 10.0 /100



                                                                 WBCs. The refer

ence



                                                                 range was not u

sed



                                                                 to interpret th

is



                                                                 result as



                                                                 normal/abnormal

.

 

             NRBC x10^3 (test code <0.01        See_Comment                [Auto

mated



             = [id number])                                        message] The s

ystem



                                                                 which generated



                                                                 this result



                                                                 transmitted



                                                                 reference range

:



                                                                 10*3/?L. The



                                                                 reference range

 was



                                                                 not used to



                                                                 interpret this



                                                                 result as



                                                                 normal/abnormal

.

 

             GRAN MAT (NEUT) % 76.4 %                                 



             (test code = 770-8)                                        

 

             IMM GRAN % (test code 0.40 %                                 



             = 4535686946)                                        

 

             LYMPH % (test code = 16.3 %                                 



             736-9)                                              

 

             MONO % (test code = 5.6 %                                  



             5905-5)                                             

 

             EOS % (test code = 1.0 %                                  



             713-8)                                              

 

             BASO % (test code = 0.3 %                                  



             706-2)                                              

 

             GRAN MAT x10^3(ANC) 8.81 10*3/uL 1.99-6.95    H            



             (test code =                                        



             9814790676)                                         

 

             IMM GRAN x10^3 (test 0.05 10*3/uL 0.00-0.06                 



             code = 9197287335)                                        

 

             LYMPH x10^3 (test code 1.88 10*3/uL 1.09-3.23                 



             = 731-0)                                            

 

             MONO x10^3 (test code 0.64 10*3/uL 0.36-1.02                 



             = 742-7)                                            

 

             EOS x10^3 (test code = 0.12 10*3/uL 0.06-0.53                 



             711-2)                                              

 

             BASO x10^3 (test code 0.03 10*3/uL 0.01-0.09                 



             = 704-7)                                            

 

             Lab Interpretation Abnormal                               



             (test code = 13302-1)                                        



[facility] 
[facility]:01:33





             Test Item    Value        Reference Range Interpretation Comments

 

             SARS-CoV-2 Rapid ID NOW Not Detected Not Detected              



             (test code = 51524-9)                                        

 

             MAL (test code = MAL) ID NOW COVID-19 Assay                        

   



                          is an isothermal                           



                          nucleic acid                           



                          amplification test                           



                          intended for the                           



                          qualitative detection                           



                          of nucleic acid from                           



                          SARS-CoV-2 viral RNA                           



                          in nasopharyngeal (NP)                           



                          specimens. It is used                           



                          under Emergency Use                           



                          Authorization (EUA) by                           



                          FDA. The limit of                           



                          detection (LOD) of the                           



                          assay is 125 Genome                           



                          Equivalents/mL. A                           



                          positive result is                           



                          indicative of the                           



                          presence of SARS-CoV-2                           



                          RNA. ?Clinical                           



                          correlation with                           



                          patient history and                           



                          other diagnostic                           



                          information is                           



                          necessary to determine                           



                          patient infection                           



                          status. A negative                           



                          (Not Detected) result                           



                          does not preclude                           



                          SARS-CoV-2 infection.                           



                          In patients with                           



                          clinical symptoms and                           



                          other tests that are                           



                          consistent with                           



                          SARS-CoV-2 infection,                           



                          negative results                           



                          should be treated as                           



                          presumptive negative                           



                          and a new specimen                           



                          should be tested with                           



                          alternative PCR                           



                          molecular test.                           



                          Invalid: Please                           



                          collect a new specimen                           



                          for repeat patient                           



                          testing if clinically                           



                          indicated.                             

 

             Lab Interpretation Normal                                 



             (test code = 98878-8)                                        



[facility]862[facility] 
01:00:33





             Test Item    Value        Reference Range Interpretation Comments

 

             NA (test code = 140 mmol/L   135-145                   



             4001899178)                                         

 

             K (test code = 4.4 mmol/L   3.5-5.0                   



             1018085819)                                         

 

             CL (test code = 103 mmol/L                       



             7510068105)                                         

 

             CO2 TOTAL (test code = 28 mmol/L    23-31                     



             3384930169)                                         

 

             AGAP (test code =              2-16                      



             0077160942)                                         

 

             BUN (test code = 15 mg/dL     7-23                      



             3925316577)                                         

 

             GLUCOSE (test code = 85 mg/dL                         



             3893314588)                                         

 

             CREATININE (test code = 0.60 mg/dL   0.60-1.25                 



             7766671693)                                         

 

             TOTAL BILI (test code = 1.1 mg/dL    0.1-1.1                   



             2682084584)                                         

 

             CALCIUM (test code = 9.0 mg/dL    8.6-10.6                  



             1207812270)                                         

 

             T PROTEIN (test code = 7.8 g/dL     6.3-8.2                   



             3251003041)                                         

 

             ALBUMIN (test code = 4.0 g/dL     3.5-5.0                   



             2536102804)                                         

 

             ALK PHOS (test code = 166 U/L             H            



             1538978774)                                         

 

             ALTv (test code = 42 U/L       5-50                      



             1742-6)                                             

 

             AST(SGOT) (test code = 32 U/L       13-40                     



             9310702338)                                         

 

             eGFR (test code =              mL/min/1.73m2              



             1173288033)                                         

 

             MAL (test code = MAL) Association of                           



                          Glomerular Filtration                           



                          Rate (GFR) and Staging                           



                          of Kidney Disease*                           



                          +---------------------                           



                          --+-------------------                           



                          --+-------------------                           



                          ------+| GFR                           



                          (mL/min/1.73 m2) ?|                           



                          With Kidney Damage ?|                           



                          ?Without Kidney                           



                          Damage+---------------                           



                          --------+-------------                           



                          --------+-------------                           



                          ------------+| ?>90 ?                           



                          ? ? ? ? ? ? ? ?|                           



                          ?Stage one ? ? ? ? ?|                           



                          ? Normal ? ? ? ? ? ? ?                           



                          ?+--------------------                           



                          ---+------------------                           



                          ---+------------------                           



                          -------+| ?60-89 ? ? ?                           



                          ? ? ? ? ?| ?Stage two                           



                          ? ? ? ? ?| ? Decreased                           



                          GFR ? ? ? ?                            



                          +---------------------                           



                          --+-------------------                           



                          --+-------------------                           



                          ------+| ?30-59 ? ? ?                           



                          ? ? ? ? ?| ?Stage                           



                          three ? ? ? ?| ? Stage                           



                          three ? ? ? ? ?                           



                          +---------------------                           



                          --+-------------------                           



                          --+-------------------                           



                          ------+| ?15-29 ? ? ?                           



                          ? ? ? ? ?| ?Stage four                           



                          ? ? ? ? | ? Stage four                           



                          ? ? ? ? ?                              



                          ?+--------------------                           



                          ---+------------------                           



                          ---+------------------                           



                          -------+| ?<15 (or                           



                          dialysis) ? ?| ?Stage                           



                          five ? ? ? ? | ? Stage                           



                          five ? ? ? ? ?                           



                          ?+--------------------                           



                          ---+------------------                           



                          ---+------------------                           



                          -------+ *Each stage                           



                          assumes the associated                           



                          GFR level has been in                           



                          effect for at least                           



                          three months. ?Stages                           



                          1 to 5, with or                           



                          without kidney                           



                          disease, indicate                           



                          chronic kidney                           



                          disease. Notes:                           



                          Determination of                           



                          stages one and two                           



                          (with eGFR                             



                          >59mL/min/1.73 m2)                           



                          requires estimation of                           



                          kidney damage for at                           



                          least three months as                           



                          defined by structural                           



                          or functional                           



                          abnormalities of the                           



                          kidney, manifested by                           



                          either:Pathological                           



                          abnormalities or                           



                          Markers of kidney                           



                          damage (including                           



                          abnormalities in the                           



                          composition of the                           



                          blood or urine or                           



                          abnormalities in                           



                          imaging tests).                           

 

             Lab Interpretation Abnormal                               



             (test code = 73102-7)                                        



[facility]





             Test Item    Value        Reference Range Interpretation Comments

 

             LIPASE (test code = 7864990333) 57 U/L       0-220                 

    

 

             Lab Interpretation (test code = Normal                             

    



             94223-8)                                            



[facility]55





             Test Item    Value        Reference Range Interpretation Comments

 

             APPEARANCE (test code = Turbid       Clear        A            



             1662773835)                                         

 

             COLOR (test code = Yellow       Yellow                    



             6606657320)                                         

 

             PH (test code =              4.8-8.0                   



             0549361490)                                         

 

             SP GRAVITY (test code =              1.003-1.030               



             8142874763)                                         

 

             GLU U QUAL (test code = Normal       Normal                    



             9052825183)                                         

 

             BLOOD (test code = 1+           Negative     A            



             7821283000)                                         

 

             KETONES (test code = 20 mg/dL     Negative     A            



             4816612887)                                         

 

             PROTEIN (test code = 100 mg/dL    Negative     A            



             2887-8)                                             

 

             UROBILIN (test code = 2.0 mg/dL    Normal       A            



             8731046790)                                         

 

             BILIRUBIN (test code = Negative     Negative                  



             3245333649)                                         

 

             NITRITE (test code = Positive     Negative     A            



             0789266591)                                         

 

             LEUK FRANCE (test code = 250/uL       Negative     A            



             4580516183)                                         

 

             RBC/HPF (test code =              See_Comment  H             [Autom

ated message]



             3171005115)                                         The system [employer]



                                                                 generated this



                                                                 result transmit

huma



                                                                 reference range

: 0 -



                                                                 3 HPF. The refe

rence



                                                                 range was not u

sed



                                                                 to interpret th

is



                                                                 result as



                                                                 normal/abnormal

.

 

             WBC/HPF (test code = >182         See_Comment  H             [Autom

ated message]



             7641813171)                                         The system [employer]



                                                                 generated this



                                                                 result transmit

huma



                                                                 reference range

: 0 -



                                                                 5 HPF. The refe

rence



                                                                 range was not u

sed



                                                                 to interpret th

is



                                                                 result as



                                                                 normal/abnormal

.

 

             BACTERIA (test code = Many         Negative     A            



             6135389456)                                         

 

             MUCOUS (test code = Moderate     Negative LPF A            



             9287329993)                                         

 

             WBC CLUMPS (test code =              See_Comment  H             [Au

tomated message]



             8475542687)                                         The system [employer]

[employer]



                                                                 generated this



                                                                 result transmit

huma



                                                                 reference range

: <=1



                                                                 HPF. The refere

nce



                                                                 range was not u

sed



                                                                 to interpret th

is



                                                                 result as



                                                                 normal/abnormal

.

 

             Lab Interpretation (test Abnormal                               



             code = 89716-1)                                        



[facility] WITH DIFF2021-05-10 00:46:14





             Test Item    Value        Reference Range Interpretation Comments

 

             WBC (test code =              See_Comment                [Automated



             6690-2)                                             message] The sy

stem



                                                                 which generated



                                                                 this result



                                                                 transmitted



                                                                 reference range

:



                                                                 4.20 - 10.70



                                                                 10*3/?L. The



                                                                 reference range

 was



                                                                 not used to



                                                                 interpret this



                                                                 result as



                                                                 normal/abnormal

.

 

             RBC (test code =              See_Comment                [Automated



             789-8)                                              message] The sy

stem



                                                                 which generated



                                                                 this result



                                                                 transmitted



                                                                 reference range

:



                                                                 4.26 - 5.52



                                                                 10*6/?L. The



                                                                 reference range

 was



                                                                 not used to



                                                                 interpret this



                                                                 result as



                                                                 normal/abnormal

.

 

             HGB (test code = 15.9 g/dL    12.2-16.4                 



             718-7)                                              

 

             HCT (test code = 47.1 %       38.4-49.3                 



             4544-3)                                             

 

             MCV (test code = 86.6 fL      81.7-95.6                 



             787-2)                                              

 

             MCH (test code = 29.2 pg      26.1-32.7                 



             785-6)                                              

 

             MCHC (test code = 33.8 g/dL    31.2-35.0                 



             786-4)                                              

 

             RDW-SD (test code = 47.6 fL      38.5-51.6                 



             89749-5)                                            

 

             RDW-CV (test code = 15.2 %       12.1-15.4                 



             788-0)                                              

 

             PLT (test code =              See_Comment  H             [Automated



             777-3)                                              message] The sy

stem



                                                                 which generated



                                                                 this result



                                                                 transmitted



                                                                 reference range

:



                                                                 150 - 328 10*3/

?L.



                                                                 The reference r

zhen



                                                                 was not used to



                                                                 interpret this



                                                                 result as



                                                                 normal/abnormal

.

 

             MPV (test code = 9.4 fL       9.8-13.0     L            



             82178-3)                                            

 

             NRBC/100 WBC (test              See_Comment                [Automat

ed



             code = 8512072451)                                        message] 

The system



                                                                 which generated



                                                                 this result



                                                                 transmitted



                                                                 reference range

:



                                                                 0.0 - 10.0 /100



                                                                 WBCs. The refer

ence



                                                                 range was not u

sed



                                                                 to interpret th

is



                                                                 result as



                                                                 normal/abnormal

.

 

             NRBC x10^3 (test code <0.01        See_Comment                [Auto

mated



             = 4486589294)                                        message] The s

ystem



                                                                 which generated



                                                                 this result



                                                                 transmitted



                                                                 reference range

:



                                                                 10*3/?L. The



                                                                 reference range

 was



                                                                 not used to



                                                                 interpret this



                                                                 result as



                                                                 normal/abnormal

.

 

             GRAN MAT (NEUT) % 61.3 %                                 



             (test code = 770-8)                                        

 

             IMM GRAN % (test code 0.70 %                                 



             = 9346809061)                                        

 

             LYMPH % (test code = 26.4 %                                 



             736-9)                                              

 

             MONO % (test code = 9.9 %                                  



             5905-5)                                             

 

             EOS % (test code = 1.3 %                                  



             713-8)                                              

 

             BASO % (test code = 0.4 %                                  



             706-2)                                              

 

             GRAN MAT x10^3(ANC) 6.39 10*3/uL 1.99-6.95                 



             (test code =                                        



             4172674819)                                         

 

             IMM GRAN x10^3 (test 0.07 10*3/uL 0.00-0.06    H            



             code = 8507293199)                                        

 

             LYMPH x10^3 (test code 2.75 10*3/uL 1.09-3.23                 



             = 731-0)                                            

 

             MONO x10^3 (test code 1.03 10*3/uL 0.36-1.02    H            



             = 742-7)                                            

 

             EOS x10^3 (test code = 0.14 10*3/uL 0.06-0.53                 



             711-2)                                              

 

             BASO x10^3 (test code 0.04 10*3/uL 0.01-0.09                 



             = 704-7)                                            

 

             Lab Interpretation Abnormal                               



             (test code = 39342-3)                                        



[facility] [id number] 13:03:00





             Test Item    Value        Reference Range Interpretation Comments

 

             POC-GLUCOSE METER 140 mg/dL           H            : TESTED A

T [facility] [address]



             (AltheaDx) (test code =                                        MILY NELSON [facility],



             153)                                               [address]:



                                                                 /Techni

christina ID



                                                                 = [id number] for ANANT CATES



POCT-GLUCOSE [id number] 09:15:00





             Test Item    Value        Reference Range Interpretation Comments

 

             POC-GLUCOSE METER 142 mg/dL           H            : TESTED A

T [facility] [address]



             ([employer]x) (test code =                                        MILY NELSON [facility],



             153)                                               [address]:



                                                                 /Techni

christina ID



                                                                 = [id number] for ANANT CATES



POCT-GLUCOSE METER2020-01-15 20:56:00





             Test Item    Value        Reference Range Interpretation Comments

 

             POC-GLUCOSE METER 134 mg/dL           H            : TESTED A

T [facility] [address]



             (BEAKER) (test code =                                        MILY NELSON [facility],



             1538)                                               [address]:



                                                                 /Techni

christina ID



                                                                 = [id number] for SHERRILL GUARDADO



POCT-GLUCOSE METER2020-01-15 16:46:00





             Test Item    Value        Reference Range Interpretation Comments

 

             POC-GLUCOSE METER 91 mg/dL                         : TESTED A

T [facility] [address]



             (BEAKER) (test code =                                        MILY NELSON [facility],



             1538)                                               [address]:



                                                                 /Techni

christina ID =



                                                                 [id number] for ANANT HAMILTON



POCT-GLUCOSE METER2020-01-15 12:19:00





             Test Item    Value        Reference Range Interpretation Comments

 

             POC-GLUCOSE METER 237 mg/dL           H            : TESTED A

T [facility] [address]



             (BEAKER) (test code =                                        MILY NELSON [facility],



             153)                                               [address]:



                                                                 /Techni

christina ID



                                                                 = [id number] for ANANT CATES



MR, EXTREMITY, LOWER, WITHOUT CONTRAST, RIGHT2020-01-15 09:12:00FINAL REPORT 
PATIENT ID: [id number] MRI of the right and left hindfoot without intravenous 
contrast History: Osteomyelitis, diabetic foot Comparison: Radiograph dated 
2020 Technique: Multiplanar multisequence MRI of the right and left 
hindfoot was performed without intravenous contrast using the hindfoot 
osteomyelitis protocol Findings: Right foot: Marked T1 and T2 hypointense soft 
tissue thickening is noted at the medial aspect of the right heel, likely to 
reflect scar tissue. There is also mild subcutaneous edema at the lateral aspect
of the mid foot and forefoot, incompletely imaged. Nodiscrete drainable fluid 
collection is identified. There is no marrow signal abnormality to suggest o
steomyelitis. There is a small nonspecific joint effusion at the ankle and 
subtalar joint. Scattereddegenerative changes are noted. There is marked fatty 
atrophy of the distal leg and foot musculature. Severe flexor hallucis longus 
tendinopathy. No tenosynovitis. Left foot: There is a large area of soft tissue 
defect and granulation tissue at the posteromedial aspect of the heel, reaching 
the calcaneus, but there is no drainable fluid collection. Deformity is noted in
the hindfoot, and the forefootappears adducted. No acute fracture. No marrow 
signal abnormality is identified to indicate acute osteomyelitis. There is no 
significant joint effusion. There is severe atrophy of the foot and distal leg 
musculature. No significant tenosynovitis identified. IMPRESSION: 
Granulation/scar tissue at the medial aspect of the heel bilaterally. No MR 
evidence of osteomyelitis. Severe muscle atrophy. Signed:Jorge Cornejoeport 
Verified Date/Time: 01/15/2020 09:12:01 Reading Location: [facility] B1 C013X Ortho 
Consult Reading Room Electronically signed by: JORGE CORNEJO M.D. on 01/15/2020 
09:12 AMMR, EXTREMITY, LOWER, WITHOUT CONTRAST, LEFT2020-01-15 09:12:00FINAL 
REPORT PATIENT ID: [id number] MRI of the right and left hindfoot without 
intravenous contrast History: Osteomyelitis, diabetic foot Comparison: 
Radiograph dated 2020 Technique: Multiplanar multisequence MRI of the
right and left hindfoot was performed without intravenous contrast using the 
hindfoot osteomyelitis protocol Findings: Right foot: Marked T1 and T2 
hypointense soft tissue thickening is noted at the medial aspect of the right 
heel, likely to reflect scar tissue. There is also mild subcutaneous edema at 
the lateral aspect of the mid foot and forefoot, incompletely imaged. Nodiscrete
drainable fluid collection is identified. There is no marrow signal abnormality 
to suggest osteomyelitis. There is a small nonspecific joint effusion at the 
ankle and subtalar joint. Scattereddegenerative changes are noted. There is 
marked fatty atrophy of the distal leg and foot musculature. Severe flexor 
hallucis longus tendinopathy. No tenosynovitis. Left foot: There is a large area
of soft tissue defect and granulation tissue at the posteromedial aspect of the 
heel, reaching the calcaneus, but there is no drainable fluid collection. 
Deformity is noted in the hindfoot, and the forefootappears adducted. No acute 
fracture. No marrow signal abnormality is identified to indicate acute ost
eomyelitis. There is no significant joint effusion. There is severe atrophy of 
the foot and distal leg musculature. No significant tenosynovitis identified. 
IMPRESSION: Granulation/scar tissue at the medial aspect of the heel 
bilaterally. No MR evidence of osteomyelitis. Severe muscle atrophy. Signed:Jorge Cornejo MDReport Verified Date/Time: 01/15/2020 09:12:01 Reading Location: [address] Reading Room Electronically signed by: JORGE CORNEJO M.D. on 
01/15/2020 09:12 AMPOCT-GLUCOSE METER2020-01-15 08:47:00





             Test Item    Value        Reference Range Interpretation Comments

 

             POC-GLUCOSE METER 264 mg/dL           H            : TESTED A

T [facility] [address]



             (BEAKER) (test code =                                        [facility],



             153)                                               [address]:



                                                                 /Techni

christina ID



                                                                 = [id number] for ANANT CATES



ISLET CELL AB SCR2020-01-15 07:43:00





             Test Item    Value        Reference Range Interpretation Comments

 

             ISLET CELL AB Refer to individual                           



             AUTOVERIFICATION (test Islet Cell Ab                           



             code = 2556) and/or Islet Cell                           



                          Ab Titer results.                           



POCT-GLUCOSE [id number] 21:27:00





             Test Item    Value        Reference Range Interpretation Comments

 

             POC-GLUCOSE METER 130 mg/dL           H            : TESTED A

T [facility] [address]



             (BEAKER) (test code =                                        [facility],



             153)                                               [address]:



                                                                 /Techni

christina ID



                                                                 = [id number] for KRANTHI PIERRE



BLOOD [id number] 13:00:00





             Test Item    Value        Reference Range Interpretation Comments

 

             CULTURE (BEAKER) (test No growth in 5 days                         

  



             code = 1095)                                        



BLOOD [id number] 13:00:00





             Test Item    Value        Reference Range Interpretation Comments

 

             CULTURE (BEAKER) (test No growth in 5 days                         

  



             code = 1095)                                        



POCT-GLUCOSE [id number] 12:57:00





             Test Item    Value        Reference Range Interpretation Comments

 

             POC-GLUCOSE METER 138 mg/dL           H            : TESTED A

T [facility] [address]



             (BEAKER) (test code =                                        [facility],



             153)                                               [address]:



                                                                 /Techni

christina ID



                                                                 = [id number] for BARBARA HARKINS



POCT-GLUCOSE [id number] 08:43:00





             Test Item    Value        Reference Range Interpretation Comments

 

             POC-GLUCOSE METER 226 mg/dL           H            : TESTED A

T [facility] [address]



             (BEAKER) (test code =                                        [facility],



             1538)                                               [address]:



                                                                 /Techni

christina ID



                                                                 = [id number] for [place]

LLIS,



                                                                 BARBARA



POCT-GLUCOSE [id number] 21:11:00





             Test Item    Value        Reference Range Interpretation Comments

 

             POC-GLUCOSE METER 254 mg/dL           H            : Notified

 RN/MD:



             ([facility]) (test code =                                        TESTED

 AT [facility] [address]



             1538)                                               [facility],



                                                                 [address]:



                                                                 /Techni

christina ID



                                                                 = [id number] for KRANTHI PIERRE



POCT-GLUCOSE [id number] 21:02:00





             Test Item    Value        Reference Range Interpretation Comments

 

             POC-GLUCOSE METER 177 mg/dL           H            : TESTED A

T [facility] [address]



             ([facility]) (test code =                                        [facility],



             1538)                                               [address]:



                                                                 /Techni

christina ID



                                                                 = [id number] for [place]

LLNIURKA,



                                                                 BARBARA



POCT-GLUCOSE [id number] 12:31:00





             Test Item    Value        Reference Range Interpretation Comments

 

             POC-GLUCOSE METER 215 mg/dL           H            : TESTED A

T [facility] [address]



             ([facility]) (test code =                                        [facility],



             1538)                                               [address]:



                                                                 /Techni

christina ID



                                                                 = [id number] for [place]

LLIS,



                                                                 BARBARA



WOUND CULTURE + GRAM [id number] 10:10:00





             Test Item    Value        Reference    Interpretation Comments



                                       Range                     

 

             CULTURE ([facility]) PROTEUS MIRABILIS              A            1+ Pro

teus



             (test code = 1095)                                        mirabilis

 

             Amikacin (test code =                           S            



             1)                                                  

 

             Ampicillin +                           S            



             Sulbactam (test code                                        



             = 6)                                                

 

             Aztreonam (test code                           S            



             = 32)                                               

 

             Cefepime (test code =                           S            



             51)                                                 

 

             Cefoxitin (test code                           R            



             = 68)                                               

 

             Ceftazidime (test                           S            



             code = 27)                                          

 

             Ceftriaxone (test                           S            



             code = 52)                                          

 

             Ertapenem (test code                           S            



             = 38)                                               

 

             Gentamicin (test code                           S            



             = 18)                                               

 

             Levofloxacin (test                           R            



             code = 22)                                          

 

             Meropenem (test code                           S            



             = 34)                                               

 

             Piperacillin +                           S            



             Tazobactam (test code                                        



             = 29)                                               

 

             Tetracycline (test                           R            



             code = 2)                                           

 

             Tobramycin (test code                           S            



             = 25)                                               

 

             Trimethoprim +                           R            



             Sulfamethoxazole                                        



             (test code = 47)                                        

 

             CULTURE (AKER) METHICILLIN               A            1+ Methicil

bryn



             (test code = 1095) RESISTANT                              resistant



                          STAPHYLOCOCCUS                           Staphylococcu

s



                          AUREUS                                 aureus

 

             Clindamycin (test                           R            



             code = 10)                                          

 

             Erythromycin (test                           R            



             code = 4)                                           

 

             Linezolid (test code                           S            



             = 40)                                               

 

             Nitrofurantoin (test                           S            



             code = 23)                                          

 

             Oxacillin (test code                           R            



             = 14)                                               

 

             Rifampin (test code =                           S            



             43)                                                 

 

             Tetracycline (test                           S            



             code = 2)                                           

 

             Trimethoprim +                           S            



             Sulfamethoxazole                                        



             (test code = 47)                                        

 

             Vancomycin (test code                           S            



             = 13)                                               

 

             CULTURE (BEAKER) ESCHERICHIA COLI              A            <1+ Esc

herichia



             (test code = 1095)                                        coliESBL 

Positive

 

             Amikacin (test code =                           S            



             1)                                                  

 

             Ampicillin +                           R            



             Sulbactam (test code                                        



             = 6)                                                

 

             Aztreonam (test code                           R            



             = 32)                                               

 

             Cefepime (test code =                           R            



             51)                                                 

 

             Cefoxitin (test code                           R            



             = 68)                                               

 

             Ceftazidime (test                           R            



             code = 27)                                          

 

             Ceftriaxone (test                           R            



             code = 52)                                          

 

             Ertapenem (test code                           S            



             = 38)                                               

 

             Gentamicin (test code                           S            



             = 18)                                               

 

             Levofloxacin (test                           R            



             code = 22)                                          

 

             Meropenem (test code                           S            



             = 34)                                               

 

             Nitrofurantoin (test                           S            



             code = 23)                                          

 

             Piperacillin +                           R            



             Tazobactam (test code                                        



             = 29)                                               

 

             Tetracycline (test                           S            



             code = 2)                                           

 

             Tobramycin (test code                           S            



             = 25)                                               

 

             Trimethoprim +                           R            



             Sulfamethoxazole                                        



             (test code = 47)                                        

 

             CULTURE (BEAKER)                           A            Beta-hemoly

tic



             (test code = 1095)                                        streptoco

ccus



                                                                 group B, by



                                                                 serological



                                                                 grouping

 

             GRAM STAIN RESULT 3+ WBCs                                



             (BEAKER) (test code =                                        



             1123)                                               

 

             GRAM STAIN RESULT 1+ gram negative                           



             (BEAKER) (test code = rods                                   



             109069)                                             

 

             GRAM STAIN RESULT 2+ gram positive                           



             (BEAKER) (test code = rods                                   



             153671)                                             

 

             GRAM STAIN RESULT <1+ gram negative                           



             (BEAKER) (test code = coccobacilli                           



             516021)                                             

 

             GRAM STAIN RESULT 2+ gram positive                           



             (BEAKER) (test code = cocci in pairs                           



             506472)                                             



POCT-GLUCOSE [id number] 08:52:00





             Test Item    Value        Reference Range Interpretation Comments

 

             POC-GLUCOSE METER 209 mg/dL           H            : TESTED A

T [facility] [address]



             (BEAKER) (test code =                                        [place],



             1538)                                               [address]:



                                                                 /Techni

christina ID



                                                                 = [id number] for [place]

LLIS,



                                                                 BARBARA



POCT-GLUCOSE [id number] 21:26:00





             Test Item    Value        Reference Range Interpretation Comments

 

             POC-GLUCOSE METER 255 mg/dL           H            : TESTED A

T [facility] [address]



             (BEAKER) (test code =                                        [facility],



             [facility])                                               [address]:



                                                                 /Techni

christina ID



                                                                 = [id number] for CR

ISWELL,



                                                                 TIA



POCT-GLUCOSE [id number] 17:34:00





             Test Item    Value        Reference Range Interpretation Comments

 

             POC-GLUCOSE METER 227 mg/dL           H            : TESTED A

T [facility] [address]



             (BEAKER) (test code =                                        [facility],



             [facility])                                               [address]:



                                                                 /Techni

christina ID



                                                                 = [id number] for WASSERMAN

NNY,



                                                                 NAKITA



POCT-GLUCOSE [id number] 11:28:00





             Test Item    Value        Reference Range Interpretation Comments

 

             POC-GLUCOSE METER 282 mg/dL           H            : TESTED A

T [facility] [address]



             (BEAKER) (test code =                                        [facility],



             [facility])                                               [address]:



                                                                 /Techni

christina ID



                                                                 = [id number] for WASSERMAN

NNY,



                                                                 NAKITA



POCT-GLUCOSE [id number] 08:19:00





             Test Item    Value        Reference Range Interpretation Comments

 

             POC-GLUCOSE METER 329 mg/dL           H            : TESTED A

T [facility] [address]



             (BEAKER) (test code =                                        [facility],



             [facility])                                               [address]:



                                                                 /Techni

christina ID



                                                                 = [id number] for ANANT CATES



POCT-GLUCOSE [id number] 21:32:00





             Test Item    Value        Reference Range Interpretation Comments

 

             POC-GLUCOSE METER 275 mg/dL           H            : TESTED A

T [facility] [address]



             (BEAKER) (test code =                                        [facility],



             [facility])                                               [address]:



                                                                 /Techni

christina ID



                                                                 = [id number] for CR

ISWELL,



                                                                 TIA



POCT-GLUCOSE [id number] 17:47:00





             Test Item    Value        Reference Range Interpretation Comments

 

             POC-GLUCOSE METER 304 mg/dL           H            : TESTED A

T [facility] [address]



             (BEAKER) (test code =                                        [facility],



             [facility])                                               [address]:



                                                                 /Techni

christina ID



                                                                 = [id number] for [place]

SAVAGE,



                                                                 LUIZA



URINE [id number] 15:21:00





             Test Item    Value        Reference Range Interpretation Comments

 

             CULTURE (BEAKER)                           A            >100,000 co

l/mL



             (test code = 1095)                                        Beta-hemo

lytic



                                                                 streptococcus g

roup



                                                                 B, by serologic

al



                                                                 grouping

 

             CULTURE (BEAKER) PROTEUS                   A            80-89,000 c

ol/mL



             (test code = 1095) MIRABILIS                              Proteus



                                                                 mirabilisESBL



                                                                 Positive

 

             Amikacin (test code =                           S            



             1)                                                  

 

             Ampicillin +                           R            



             Sulbactam (test code                                        



             = 6)                                                

 

             Aztreonam (test code                           R            



             = 32)                                               

 

             Cefepime (test code =                           R            



             51)                                                 

 

             Cefoxitin (test code                           R            



             = 68)                                               

 

             Ceftazidime (test                           R            



             code = 27)                                          

 

             Ceftriaxone (test                           R            



             code = 52)                                          

 

             Ertapenem (test code                           S            



             = 38)                                               

 

             Gentamicin (test code                           R            



             = 18)                                               

 

             Levofloxacin (test                           R            



             code = 22)                                          

 

             Meropenem (test code                           S            



             = 34)                                               

 

             Nitrofurantoin (test                           R            



             code = 23)                                          

 

             Tetracycline (test                           R            



             code = 2)                                           

 

             Tobramycin (test code                           R            



             = 25)                                               



POCT-GLUCOSE [id number] 12:16:00





             Test Item    Value        Reference Range Interpretation Comments

 

             POC-GLUCOSE METER 337 mg/dL           H            : TESTED A

T [facility] [address]



             (BEAKER) (test code =                                        [facility],



             1538)                                               [address]:



                                                                 /Techni

christina ID



                                                                 = [id number] for LUIZA FIGUEROA



BASIC METABOLIC [id number] 10:39:00





             Test Item    Value        Reference Range Interpretation Comments

 

             SODIUM (BEAKER) 134 meq/L    136-145      L            



             (test code = 381)                                        

 

             POTASSIUM (BEAKER) 4.6 meq/L    3.5-5.1                   



             (test code = 379)                                        

 

             CHLORIDE (BEAKER) 105 meq/L                        



             (test code = 382)                                        

 

             CO2 (BEAKER) (test 20 meq/L     22-29        L            



             code = 355)                                         

 

             BLOOD UREA NITROGEN 14 mg/dL     7-21                      



             (BEAKER) (test code                                        



             = 354)                                              

 

             CREATININE (BEAKER) 0.97 mg/dL   0.57-1.25                 



             (test code = 358)                                        

 

             GLUCOSE RANDOM 429 mg/dL           HH           



             (BEAKER) (test code                                        



             = 652)                                              

 

             CALCIUM (BEAKER) 8.9 mg/dL    8.4-10.2                  



             (test code = 697)                                        

 

             EGFR (BEAKER) (test 107 mL/min/1.73                           ESTIM

ATED GFR IS



             code = 1092) sq m                                   NOT AS ACCURATE

 AS



                                                                 CREATININE



                                                                 CLEARANCE IN



                                                                 PREDICTING



                                                                 GLOMERULAR



                                                                 FILTRATION RATE

.



                                                                 ESTIMATED GFR I

S



                                                                 NOT APPLICABLE 

FOR



                                                                 DIALYSIS PATIEN

TS.



 ID - MAGAN FPOCT-GLUCOSE [id number] 08:29:00





             Test Item    Value        Reference Range Interpretation Comments

 

             POC-GLUCOSE METER 395 mg/dL           H            : TESTED A

T [facility] [address]



             (BEAKER) (test code =                                        [place],



             1538)                                               [address]:



                                                                 /Techni

christina ID



                                                                 = [id number] for LUIZA FIGUEROA



CBC W/PLT COUNT &amp; AUTO [id number] 06:40:00





             Test Item    Value        Reference Range Interpretation Comments

 

             WHITE BLOOD CELL COUNT (BEAKER) 7.2 K/ L     3.5-10.5              

    



             (test code = 775)                                        

 

             RED BLOOD CELL COUNT (BEAKER) 5.48 M/ L    4.63-6.08               

  



             (test code = 761)                                        

 

             HEMOGLOBIN (BEAKER) (test code = 15.1 GM/DL   13.7-17.5            

     



             410)                                                

 

             HEMATOCRIT (BEAKER) (test code = 46.4 %       40.1-51.0            

     



             411)                                                

 

             MEAN CORPUSCULAR VOLUME (BEAKER) 84.7 fL      79.0-92.2            

     



             (test code = 753)                                        

 

             MEAN CORPUSCULAR HEMOGLOBIN 27.6 pg      25.7-32.2                 



             (BEAKER) (test code = 751)                                        

 

             MEAN CORPUSCULAR HEMOGLOBIN CONC 32.5 GM/DL   32.3-36.5            

     



             (BEAKER) (test code = 752)                                        

 

             RED CELL DISTRIBUTION WIDTH 15.5 %       11.6-14.4    H            



             (BEAKER) (test code = 412)                                        

 

             PLATELET COUNT (BEAKER) (test 315 K/CU MM  150-450                 

  



             code = 756)                                         

 

             MEAN PLATELET VOLUME (BEAKER) 10.5 fL      9.4-12.4                

  



             (test code = 754)                                        

 

             NUCLEATED RED BLOOD CELLS 0 /100 WBC   0-0                       



             (BEAKER) (test code = 413)                                        

 

             NEUTROPHILS RELATIVE PERCENT 62 %                                  

 



             (BEAKER) (test code = 429)                                        

 

             LYMPHOCYTES RELATIVE PERCENT 26 %                                  

 



             (BEAKER) (test code = 430)                                        

 

             MONOCYTES RELATIVE PERCENT 9 %                                    



             (BEAKER) (test code = 431)                                        

 

             EOSINOPHILS RELATIVE PERCENT 2 %                                   

 



             (BEAKER) (test code = 432)                                        

 

             BASOPHILS RELATIVE PERCENT 0 %                                    



             (BEAKER) (test code = 437)                                        

 

             NEUTROPHILS ABSOLUTE COUNT 4.48 K/ L    1.78-5.38                 



             (BEAKER) (test code = 670)                                        

 

             LYMPHOCYTES ABSOLUTE COUNT 1.87 K/ L    1.32-3.57                 



             (BEAKER) (test code = 414)                                        

 

             MONOCYTES ABSOLUTE COUNT (BEAKER) 0.62 K/ L    0.30-0.82           

      



             (test code = 415)                                        

 

             EOSINOPHILS ABSOLUTE COUNT 0.17 K/ L    0.04-0.54                 



             (BEAKER) (test code = 416)                                        

 

             BASOPHILS ABSOLUTE COUNT (BEAKER) 0.03 K/ L    0.01-0.08           

      



             (test code = 417)                                        

 

             IMMATURE GRANULOCYTES-RELATIVE 1 %          0-1                    

   



             PERCENT (BEAKER) (test code =                                      

  



             2801)                                               



POCT-GLUCOSE METER2020-01-10 19:55:00





             Test Item    Value        Reference Range Interpretation Comments

 

             POC-GLUCOSE METER 369 mg/dL           H            : TESTED A

T [facility] [address]



             (BEAKER) (test code =                                        [facility],



             [facility]8)                                               [id number]:



                                                                 /Techni

christina ID



                                                                 = [id number] for CR

RADHA



                                                                 TIA



POCT-GLUCOSE METER2020-01-10 16:45:00





             Test Item    Value        Reference Range Interpretation Comments

 

             POC-GLUCOSE METER 272 mg/dL           H            : TESTED A

T [facility] [address]



             (BEAKER) (test code =                                        [facility],



             [address]38)                                               [id number]:



                                                                 /Techni

christina ID



                                                                 = [id number] for TH

SARAH ANDERSON



POCT-GLUCOSE METER2020-01-10 11:40:00





             Test Item    Value        Reference Range Interpretation Comments

 

             POC-GLUCOSE METER 277 mg/dL           H            : TESTED A

T [facility] [address]



             (BEAKER) (test code =                                        [facility],



             1538)                                               [id number]:



                                                                 /Techni

christina ID



                                                                 = [id number] for TH

JUSTIN



                                                                 SARAH



BASIC METABOLIC PANEL2020-01-10 10:22:00





             Test Item    Value        Reference Range Interpretation Comments

 

             SODIUM (BEAKER) 132 meq/L    136-145      L            



             (test code = 381)                                        

 

             POTASSIUM (BEAKER) 4.4 meq/L    3.5-5.1                   



             (test code = 379)                                        

 

             CHLORIDE (BEAKER) 103 meq/L                        



             (test code = 382)                                        

 

             CO2 (BEAKER) (test 21 meq/L     22-29        L            



             code = 355)                                         

 

             BLOOD UREA NITROGEN 14 mg/dL     7-21                      



             (BEAKER) (test code                                        



             = 354)                                              

 

             CREATININE (BEAKER) 0.89 mg/dL   0.57-1.25                 



             (test code = 358)                                        

 

             GLUCOSE RANDOM 428 mg/dL           HH           



             (BEAKER) (test code                                        



             = 652)                                              

 

             CALCIUM (BEAKER) 9.2 mg/dL    8.4-10.2                  



             (test code = 697)                                        

 

             EGFR (BEAKER) (test 119 mL/min/1.73                           ESTIM

ATED GFR IS



             code = 1092) sq m                                   NOT AS ACCURATE

 AS



                                                                 CREATININE



                                                                 CLEARANCE IN



                                                                 PREDICTING



                                                                 GLOMERULAR



                                                                 FILTRATION RATE

.



                                                                 ESTIMATED GFR I

S



                                                                 NOT APPLICABLE 

FOR



                                                                 DIALYSIS PATIEN

TS.



 ID - NTPLIPID PANEL2020-01-10 10:17:00





             Test Item    Value        Reference Range Interpretation Comments

 

             TRIGLYCERIDES (BEAKER) (test code = 692 mg/dL                      

        



             540)                                                

 

             CHOLESTEROL (BEAKER) (test code = 196 mg/dL                        

      



             631)                                                

 

             HDL CHOLESTEROL (BEAKER) (test code 24 mg/dL                       

        



             = 976)                                              



Calculated LDL not valid if triglyceride &gt;400 mg/dLTriglyceride Reference 
Range: Low Risk &lt;150Borderline 150-199 High Risk 200-499 Very High Risk 
&gt;=500Cholesterol Reference Range: Low Risk &lt;200 Borderline 200-239 High 
Risk &gt;240HDL Cholesterol Reference Range:  Low Risk &gt;=60 High Risk 
&lt;40LDL Cholesterol Reference Range: Optimal &lt;100 Near Optimal 100-129 
Borderline 130-159 Okmh629-854 Very High &gt;=190  ID - [id number] 
METER2020-01-10 08:28:00





             Test Item    Value        Reference Range Interpretation Comments

 

             POC-GLUCOSE METER 388 mg/dL           H            : TESTED A

T [facility] [address]



             (BEAKER) (test code =                                        [place],



             [address]38)                                               [id number]:



                                                                 /Techni

christina ID



                                                                 = [id number] for ANANT CATES



HEMOGLOBIN A1C2020-01-10 07:54:00





             Test Item    Value        Reference Range Interpretation Comments

 

             HEMOGLOBIN A1C (BEAKER) (test code = 10.4 %       4.3-6.1      H   

         



             368)                                                



CBC W/PLT COUNT &amp; AUTO DIFFERENTIAL2020-01-10 05:56:00





             Test Item    Value        Reference Range Interpretation Comments

 

             WHITE BLOOD CELL COUNT (BEAKER) 6.5 K/ L     3.5-10.5              

    



             (test code = 775)                                        

 

             RED BLOOD CELL COUNT (BEAKER) 5.21 M/ L    4.63-6.08               

  



             (test code = 761)                                        

 

             HEMOGLOBIN (BEAKER) (test code = 14.6 GM/DL   13.7-17.5            

     



             410)                                                

 

             HEMATOCRIT (BEAKER) (test code = 44.3 %       40.1-51.0            

     



             411)                                                

 

             MEAN CORPUSCULAR VOLUME (BEAKER) 85.0 fL      79.0-92.2            

     



             (test code = 753)                                        

 

             MEAN CORPUSCULAR HEMOGLOBIN 28.0 pg      25.7-32.2                 



             (BEAKER) (test code = 751)                                        

 

             MEAN CORPUSCULAR HEMOGLOBIN CONC 33.0 GM/DL   32.3-36.5            

     



             (BEAKER) (test code = 752)                                        

 

             RED CELL DISTRIBUTION WIDTH 15.6 %       11.6-14.4    H            



             (BEAKER) (test code = 412)                                        

 

             PLATELET COUNT (BEAKER) (test 294 K/CU MM  150-450                 

  



             code = 756)                                         

 

             MEAN PLATELET VOLUME (BEAKER) 11.2 fL      9.4-12.4                

  



             (test code = 754)                                        

 

             NUCLEATED RED BLOOD CELLS 0 /100 WBC   0-0                       



             (BEAKER) (test code = 413)                                        

 

             NEUTROPHILS RELATIVE PERCENT 56 %                                  

 



             (BEAKER) (test code = 429)                                        

 

             LYMPHOCYTES RELATIVE PERCENT 31 %                                  

 



             (BEAKER) (test code = 430)                                        

 

             MONOCYTES RELATIVE PERCENT 10 %                                   



             (BEAKER) (test code = 431)                                        

 

             EOSINOPHILS RELATIVE PERCENT 2 %                                   

 



             (BEAKER) (test code = 432)                                        

 

             BASOPHILS RELATIVE PERCENT 1 %                                    



             (BEAKER) (test code = 437)                                        

 

             NEUTROPHILS ABSOLUTE COUNT 3.66 K/ L    1.78-5.38                 



             (BEAKER) (test code = 670)                                        

 

             LYMPHOCYTES ABSOLUTE COUNT 2.03 K/ L    1.32-3.57                 



             (BEAKER) (test code = 414)                                        

 

             MONOCYTES ABSOLUTE COUNT (BEAKER) 0.63 K/ L    0.30-0.82           

      



             (test code = 415)                                        

 

             EOSINOPHILS ABSOLUTE COUNT 0.15 K/ L    0.04-0.54                 



             (BEAKER) (test code = 416)                                        

 

             BASOPHILS ABSOLUTE COUNT (BEAKER) 0.03 K/ L    0.01-0.08           

      



             (test code = 417)                                        

 

             IMMATURE GRANULOCYTES-RELATIVE 1 %          0-1                    

   



             PERCENT (BEAKER) (test code =                                      

  



             2801)                                               



POCT-GLUCOSE [id number] 23:47:00





             Test Item    Value        Reference Range Interpretation Comments

 

             POC-GLUCOSE METER 359 mg/dL           H            : Notified

 RN/MD:



             ([facility]) (test code =                                        TESTED

 AT [place] [address]



             153)                                               [facility],



                                                                 [address]:



                                                                 /Techni

christina ID



                                                                 = [id number] for



                                                                 LATROBERT CASTRON

ICE



POCT-GLUCOSE [id number] 21:13:00





             Test Item    Value        Reference Range Interpretation Comments

 

             POC-GLUCOSE METER 315 mg/dL           H            : TESTED A

T [facility] [address]



             ([facility]) (test code =                                        [facility],



             153)                                               [id number]:



                                                                 /Techni

christina ID



                                                                 = [id number] for Sm

ith,



                                                                 Nicki



POCT-GLUCOSE [id number] 21:13:00





             Test Item    Value        Reference Range Interpretation Comments

 

             POC-GLUCOSE METER 331 mg/dL           H            : TESTED A

T [facility] [address]



             ([facility]) (test code =                                        [facility],



             153)                                               [address]:



                                                                 /Techni

christina ID



                                                                 = [id number] for RO

DGERS,



                                                                 DAVIDECA



POCT-GLUCOSE [id number] 10:30:00





             Test Item    Value        Reference Range Interpretation Comments

 

             POC-GLUCOSE METER 341 mg/dL           H            : TESTED A

T [facility] [address]



             ([facility]) (test code =                                        [facility],



             153)                                               [id number]:



                                                                 /Techni

christina ID



                                                                 = [id number] for Sm

ith,



                                                                 Nicki



CBC W/PLT COUNT &amp; AUTO [id number] 08:48:00





             Test Item    Value        Reference Range Interpretation Comments

 

             WHITE BLOOD CELL COUNT ([facility]) 6.7 K/ L     3.5-10.5              

    



             (test code = 775)                                        

 

             RED BLOOD CELL COUNT ([facility]) 5.28 M/ L    4.63-6.08               

  



             (test code = 761)                                        

 

             HEMOGLOBIN ([facility]) (test code = 14.6 GM/DL   13.7-17.5            

     



             410)                                                

 

             HEMATOCRIT ([facility]) (test code = 44.9 %       40.1-51.0            

     



             411)                                                

 

             MEAN CORPUSCULAR VOLUME ([facility]) 85.0 fL      79.0-92.2            

     



             (test code = 753)                                        

 

             MEAN CORPUSCULAR HEMOGLOBIN 27.7 pg      25.7-32.2                 



             (BEAKER) (test code = 751)                                        

 

             MEAN CORPUSCULAR HEMOGLOBIN CONC 32.5 GM/DL   32.3-36.5            

     



             (BEAKER) (test code = 752)                                        

 

             RED CELL DISTRIBUTION WIDTH 15.3 %       11.6-14.4    H            



             (BEAKER) (test code = 412)                                        

 

             PLATELET COUNT (BEAKER) (test 300 K/CU MM  150-450                 

  



             code = 756)                                         

 

             MEAN PLATELET VOLUME (BEAKER) 10.4 fL      9.4-12.4                

  



             (test code = 754)                                        

 

             NUCLEATED RED BLOOD CELLS 0 /100 WBC   0-0                       



             (BEAKER) (test code = 413)                                        



(MANUAL DIFFERENTIAL)2020 08:48:00





             Test Item    Value        Reference Range Interpretation Comments

 

             NEUTROPHILS - REL (DIFF) (BEAKER) 69 %                             

      



             (test code = 1359)                                        

 

             LYMPHOCYTES - REL (DIFF) (BEAKER) 25 %                             

      



             (test code = 1360)                                        

 

             MONOCYTES - REL (DIFF) (BEAKER) 3 %                                

    



             (test code = 1361)                                        

 

             EOSINOPHILS - REL (DIFF) (BEAKER) 3 %                              

      



             (test code = 1362)                                        

 

             BASOPHILS - REL (DIFF) (BEAKER) 0 %                                

    



             (test code = 1363)                                        

 

             NEUTROPHILS - ABS (DIFF) (BEAKER) 4.62 K/ L    1.80-8.00           

      



             (test code = 1365)                                        

 

             LYMPHOCYTES - ABS (DIFF) (BEAKER) 1.68 K/ L    1.48-4.50           

      



             (test code = 1366)                                        

 

             MONOCYTES - ABS (DIFF) (BEAKER) 0.20 K/ L    0.00-1.30             

    



             (test code = 1367)                                        

 

             EOSINOPHILS - ABS (DIFF) (BEAKER) 0.20 K/ L    0.00-0.50           

      



             (test code = 1368)                                        

 

             BASOPHILS - ABS (DIFF) (BEAKER) 0.00 K/ L    0.00-0.20             

    



             (test code = 1369)                                        

 

             TOTAL COUNTED (BEAKER) (test code = 100                            

        



             1351)                                               

 

             PLT MORPHOLOGY (BEAKER) (test code Normal                          

       



             = 486)                                              

 

             RBC MORPHOLOGY (BEAKER) (test code Normal                          

       



             = 762)                                              

 

             SMUDGE CELLS (BEAKER) (test code = Present                         

       



             1371)                                               



HEMOGLOBIN [id number] 06:39:00





             Test Item    Value        Reference Range Interpretation Comments

 

             HEMOGLOBIN A1C (BEAKER) (test code = 10.5 %       4.3-6.1      H   

         



             368)                                                



LIPID [id number] 04:57:00





             Test Item    Value        Reference Range Interpretation Comments

 

             TRIGLYCERIDES (BEAKER) 1251 mg/dL                             Speci

men moderately



             (test code = 540)                                        hemolyzed

 

             CHOLESTEROL (BEAKER) 215 mg/dL                              Specime

n moderately



             (test code = 631)                                        hemolyzed

 

             HDL CHOLESTEROL 22 mg/dL                               



             (BEAKER) (test code =                                        



             976)                                                



Calculated LDL not valid if triglyceride &gt;400 mg/dLTriglyceride Reference 
Range: Low Risk &lt;150Borderline 150-199 High Risk 200-499 Very High Risk 
&gt;=500Cholesterol Reference Range: Low Risk &lt;200 Borderline 200-239 High 
Risk &gt;240HDL Cholesterol Reference Range: Low Risk &gt;=60 High Risk&lt;40LDL
Cholesterol Reference Range: Optimal &lt;100 Near Optimal 100-129 Borderline 
130-159 High 160-189 Very High &gt;=190 Specimen moderately lipemicBASIC 
METABOLIC [id number] 04:56:00





             Test Item    Value        Reference Range Interpretation Comments

 

             SODIUM (BEAKER) 137 meq/L    136-145                   



             (test code = 381)                                        

 

             POTASSIUM (BEAKER) 4.6 meq/L    3.5-5.1                   Specimen 

moderately



             (test code = 379)                                        hemolyzed

 

             CHLORIDE (BEAKER) 106 meq/L                        



             (test code = 382)                                        

 

             CO2 (BEAKER) (test 20 meq/L     22-29        L            



             code = 355)                                         

 

             BLOOD UREA NITROGEN 12 mg/dL     7-21                      



             (BEAKER) (test code                                        



             = 354)                                              

 

             CREATININE (BEAKER) 0.95 mg/dL   0.57-1.25                 Specimen

 moderately



             (test code = 358)                                        hemolyzed

 

             GLUCOSE RANDOM 398 mg/dL           H            



             (BEAKER) (test code                                        



             = 652)                                              

 

             CALCIUM (BEAKER) 8.8 mg/dL    8.4-10.2                  



             (test code = 697)                                        

 

             EGFR (BEAKER) (test 110 mL/min/1.73                           ESTIM

ATED GFR IS



             code = 1092) sq m                                   NOT AS ACCURATE

 AS



                                                                 CREATININE



                                                                 CLEARANCE IN



                                                                 PREDICTING



                                                                 GLOMERULAR



                                                                 FILTRATION RATE

.



                                                                 ESTIMATED GFR I

S



                                                                 NOT APPLICABLE 

FOR



                                                                 DIALYSIS PATIEN

TS.



POCT-GLUCOSE [id number] 21:53:00





             Test Item    Value        Reference Range Interpretation Comments

 

             POC-GLUCOSE METER > mg/dL             HH           : Notified

 RN/MD: TESTED



             (BEAKER) (test code =                                        AT [facility] [address] [facility]



             1538)                                               [facility], 7[address]

30:



                                                                 /Techni

christina ID =



                                                                 [id number] for ZECHARIAH TRACY



U/S, ABDOMINAL, [id number] 19:54:00Reason for exam:-&gt;Evaluation of  
shunt and for possible cyst or pseudocyst Should this be performed at the 
bedside?-&gt;YesFINAL REPORT PATIENT ID: [id number] Limited abdominal ultrasound. 
CLINICAL HISTORY: Evaluation of  shunt for possible cyst or pseudocyst. 
COMPARISON STUDY: None available. FINDINGS: Sonographic assessment of the 
abdomen was performed assessing for fluid in the region of the patient's shunts.
No fluid collections are seen. However, the study is limited by the patient's 
body habitus. CT scan would be more sensitive. Signed: Madison Grewal 
Verified Date/Time: 2020 19:54:10 Reading Location: [address] Consult 
Reading Room Electronically signed by: MADISON GREWAL M.D. on 2020 07:54 
PMPOCT-GLUCOSE [id number] 19:34:00





             Test Item    Value        Reference Range Interpretation Comments

 

             POC-GLUCOSE METER 474 mg/dL           HH           : Notified

 RN/MD:



             (KUN) (test code =                                        TESTED

 AT [place] [address]



             153)                                               [facility],



                                                                 [address]:



                                                                 /Techni

christina ID



                                                                 = [id number] for ERMELINDA SAPPULONA



URINALYSIS W/ REFLEX URINE [id number] 17:48:00





             Test Item    Value        Reference Range Interpretation Comments

 

             COLOR (BEAKER) (test code = 470) Yellow                            

     

 

             CLARITY (BEAKER) (test code = Hazy                                 

  



             469)                                                

 

             SPECIFIC GRAVITY UA (BEAKER) 1.020        1.001-1.035              

 



             (test code = 468)                                        

 

             PH UA (BEAKER) (test code = 467) 6.0          5.0-8.0              

     

 

             PROTEIN UA (BEAKER) (test code = 20 mg/dL     Negative     A       

     



             464)                                                

 

             GLUCOSE UA (BEAKER) (test code = >1000 mg/dL  Negative     A       

     



             365)                                                

 

             KETONES UA (BEAKER) (test code = 40 mg/dL     Negative     A       

     



             371)                                                

 

             BILIRUBIN UA (BEAKER) (test code Negative     Negative             

     



             = 462)                                              

 

             BLOOD UA (BEAKER) (test code = Moderate     Negative     A         

   



             461)                                                

 

             NITRITE UA (BEAKER) (test code = Positive     Negative     A       

     



             465)                                                

 

             LEUKOCYTE ESTERASE UA (BEAKER) Large        Negative     A         

   



             (test code = 466)                                        

 

             UROBILINOGEN UA (BEAKER) (test 0.2 mg/dL    0.2-1.0                

   



             code = 463)                                         

 

             RBC UA (BEAKER) (test code = 519) 0 /HPF                           

      

 

             WBC UA (BEAKER) (test code = 520) 267 /HPF                         

      

 

             BACTERIA (BEAKER) (test code = Moderate                            

   



             517)                                                

 

             SOURCE(BEAKER) (test code = 2795)                                  

      



CBC W/PLT COUNT &amp; AUTO [id number] 17:38:00





             Test Item    Value        Reference Range Interpretation Comments

 

             WHITE BLOOD CELL COUNT (BEAKER) 7.8 K/ L     3.5-10.5              

    



             (test code = 775)                                        

 

             RED BLOOD CELL COUNT (BEAKER) 5.12 M/ L    4.63-6.08               

  



             (test code = 761)                                        

 

             HEMOGLOBIN (BEAKER) (test code = 14.7 GM/DL   13.7-17.5            

     



             410)                                                

 

             HEMATOCRIT (BEAKER) (test code = 43.3 %       40.1-51.0            

     



             411)                                                

 

             MEAN CORPUSCULAR VOLUME (BEAKER) 84.6 fL      79.0-92.2            

     



             (test code = 753)                                        

 

             MEAN CORPUSCULAR HEMOGLOBIN 28.7 pg      25.7-32.2                 



             (BEAKER) (test code = 751)                                        

 

             MEAN CORPUSCULAR HEMOGLOBIN CONC 33.9 GM/DL   32.3-36.5            

     



             (BEAKER) (test code = 752)                                        

 

             RED CELL DISTRIBUTION WIDTH 15.3 %       11.6-14.4    H            



             (BEAKER) (test code = 412)                                        

 

             PLATELET COUNT (BEAKER) (test 344 K/CU MM  150-450                 

  



             code = 756)                                         

 

             MEAN PLATELET VOLUME (BEAKER) 11.0 fL      9.4-12.4                

  



             (test code = 754)                                        

 

             NUCLEATED RED BLOOD CELLS 0 /100 WBC   0-0                       



             (BEAKER) (test code = 413)                                        



(CELLAVISION MANUAL DIFF)2020 17:38:00





             Test Item    Value        Reference Range Interpretation Comments

 

             NEUTROPHILS - REL 63 %                                   



             (CELLAVISION)(BEAKER) (test code                                   

     



             = 2816)                                             

 

             LYMPHOCYTES - REL 23 %                                   



             (CELLAVISION)(BEAKER) (test code                                   

     



             = 2817)                                             

 

             MONOCYTES - REL 6 %                                    



             (CELLAVISION)(BEAKER) (test code                                   

     



             = 2818)                                             

 

             EOSINOPHILS - REL 5 %                                    



             (CELLAVISION)(BEAKER) (test code                                   

     



             = 2819)                                             

 

             BASOPHILS - REL 1 %                                    



             (CELLAVISION)(BEAKER) (test code                                   

     



             = 2820)                                             

 

             BANDS - REL (CELLAVISION)(BEAKER) 2 %          0-10                

      



             (test code = 2826)                                        

 

             NEUTROPHILS - ABS 4.91 K/ul    1.78-5.38                 



             (CELLAVISION)(BEAKER) (test code                                   

     



             = 2830)                                             

 

             LYMPHOCYTES - ABS 1.79 K/ul    1.32-3.57                 



             (CELLAVISION)(BEAKER) (test code                                   

     



             = 2831)                                             

 

             MONOCYTES - ABS 0.47 K/uL    0.30-0.82                 



             (CELLAVISION)(BEAKER) (test code                                   

     



             = 2832)                                             

 

             EOSINOPHILS - ABS 0.39 K/uL    0.04-0.54                 



             (CELLAVISION)(BEAKER) (test code                                   

     



             = 2834)                                             

 

             BASOPHILS - ABS 0.08 K/uL    0.01-0.08                 



             (CELLAVISION)(BEAKER) (test code                                   

     



             = 2835)                                             

 

             BANDS - ABS (CELLAVISION)(BEAKER) 0.16 K/uL    0.00-0.80           

      



             (test code = 2840)                                        

 

             TOTAL COUNTED (BEAKER) (test code 100                              

      



             = 1351)                                             

 

             SMUDGE CELLS (BEAKER) (test code Present                           

     



             = 1371)                                             

 

             GIANT PLATELETS (BEAKER) (test Present                             

   



             code = 313)                                         

 

             ANISOCYTOSIS (BEAKER) (test code 1+ few                            

     



             = 961)                                              

 

             POIKILOCYTES (BEAKER) (test code 2+ moderate                       

     



             = 966)                                              

 

             SPHEROCYTES (BEAKER) (test code = 1+ few                           

      



             768)                                                

 

             OVALOCYTES (BEAKER) (test code = 1+ few                            

     



             477)                                                

 

             TEAR DROP CELLS (BEAKER) (test 1+ few                              

   



             code = 481)                                         

 

             ARTIFACT (CELLAVISION)(BEAKER) Present                             

   



             (test code = 3432)                                        

 

             PLATELET CONCENTRATION Adequate                               



             (CELLAVISION)(BEAKER) (test code                                   

     



             = 3438)                                             



Received comment: User comments: Slide comments:POCT-GLUCOSE [id number] 
16:27:00





             Test Item    Value        Reference Range Interpretation Comments

 

             POC-GLUCOSE METER 424 mg/dL           HH           : Notified

 RN/MD:



             (KUN) (test code =                                        TESTED

 AT [facility] [address] [address])                                               [facility],



                                                                 [address]:



                                                                 /Techni

christina ID



                                                                 = [id number] for LONA URIOSTEGUI



CT, BRAIN, WITHOUT [id number] 15:31:00FINAL REPORT PATIENT ID: [id number] 
CT, BRAIN, WITHOUT CONTRAST CLINICAL INDICATION: Hydrocephalus COMPARISON: None 
TECHNIQUE: Noncontrast axial CT imaging of the brain and skull. DOSE REDUCTION: 
Dose modulation, iterative reconstruction, and/or weight-based adjustment of the
mA/kV was utilized to reduce the radiation dose to as low as reasonably 
achievable. FINDINGS:A total of two ventricular drainagecatheters are present. A
right transverse temporal catheter terminates across the midline just abovethe 
level of the foramen of Monro. A right parietal approach catheter terminates in 
the pineal region. Supratentorial ventricular configuration is slitlike. There 
is no herniation. The basilar cisternsare preserved. There is no identifiable 
recent infarct. 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 
partial callosal agenesis. Calvarial configuration escape of cephalic. There is 
no acute osseous abnormality. IMPRESSION: Chronic, likely congenital parenchymal
changes without recent infarct or hemorrhage. A total of two ventricular 
drainage catheters are present. Supratentorial ventricular configuration is 
slitlike and may represent over shunting. Correlation recommended. Signed:
JR Rae Robert MDReport Verified Date/Time: 2020 15:31:08 
Reading Location: [address] Neuro Reading Room Electronically signed by: 
ALONDRA RAE on 2020 03:31 PMRAD, SHUNT [id number] 
15:13:00Reason for exam:-&gt;concern for VPS malfunctionFINAL REPORT PATIENT ID:
[id number] RAD, SHUNT SERIES INDICATION: concern for VPS malfunction COMPARISON: 
None TECHNIQUE: AP and lateral radiographs of the skull, abdomen and chest were 
acquired to evaluate shunt catheter FINDINGS:An abandoned shunt catheter is 
present within the right neck. Medial to this a second shunt catheter is present
which descends along the left chest wall, terminating within the mid pelvis. 
Radiopaque portions of the catheter appear contiguous. Signed: Lisa Reyes Verified Date/Time: 2020 15:13:54 Reading Location:  [facility]
Radiology Reading Room Electronically signed by: LISA REYES MD on
2020 03:13 PMPOCT-GLUCOSE [id number] 11:38:00





             Test Item    Value        Reference Range Interpretation Comments

 

             POC-GLUCOSE METER 394 mg/dL           H            : TESTED A

T [facility] [address]



             (BEAKER) (test code =                                        [place] NELSON [facility],



             1538)                                               [id number]:



                                                                 /Techni

christina ID



                                                                 = [id number] for LONA URIOSTEGUI



HEMOGLOBIN [id number] 09:15:00





             Test Item    Value        Reference Range Interpretation Comments

 

             HEMOGLOBIN A1C (BEAKER) (test code = 10.4 %       4.3-6.1      H   

         



             368)                                                



TSH/FREE T4 IF [id number] 07:54:00





             Test Item    Value        Reference Range Interpretation Comments

 

             THYROID STIMULATING HORMONE 1.40 uIU/mL  0.35-4.94                 



             (BEAKER) (test code = 772)                                        



POCT-GLUCOSE [id number] 07:48:00





             Test Item    Value        Reference Range Interpretation Comments

 

             POC-GLUCOSE METER > mg/dL             HH           : Notified

 RN/MD: TESTED



             (BEAKER) (test code =                                        AT [facility] [address] [facility]



             1538)                                               [facility], [address]

30:



                                                                 /Techni

christina ID =



                                                                 [id number] for LONA GOLDSMITH



BASIC METABOLIC [id number] 07:44:00





             Test Item    Value        Reference Range Interpretation Comments

 

             SODIUM (BEAKER) 134 meq/L    136-145      L            



             (test code = 381)                                        

 

             POTASSIUM (BEAKER) 4.4 meq/L    3.5-5.1                   



             (test code = 379)                                        

 

             CHLORIDE (BEAKER) 103 meq/L                        



             (test code = 382)                                        

 

             CO2 (BEAKER) (test 20 meq/L     22-29        L            



             code = 355)                                         

 

             BLOOD UREA NITROGEN 17 mg/dL     7-21                      



             (BEAKER) (test code                                        



             = 354)                                              

 

             CREATININE (BEAKER) 1.09 mg/dL   0.57-1.25                 



             (test code = 358)                                        

 

             GLUCOSE RANDOM 464 mg/dL           HH           



             (BEAKER) (test code                                        



             = 652)                                              

 

             CALCIUM (BEAKER) 8.6 mg/dL    8.4-10.2                  



             (test code = 697)                                        

 

             EGFR (BEAKER) (test 94 mL/min/1.73                           ESTIMA

HUMA GFR IS



             code = 1092) sq m                                   NOT AS ACCURATE

 AS



                                                                 CREATININE



                                                                 CLEARANCE IN



                                                                 PREDICTING



                                                                 GLOMERULAR



                                                                 FILTRATION RATE

.



                                                                 ESTIMATED GFR I

S



                                                                 NOT APPLICABLE 

FOR



                                                                 DIALYSIS PATIEN

TS.



LIPID [id number] 07:34:00





             Test Item    Value        Reference Range Interpretation Comments

 

             TRIGLYCERIDES (BEAKER) (test code = 750 mg/dL                      

        



             540)                                                

 

             CHOLESTEROL (BEAKER) (test code = 196 mg/dL                        

      



             631)                                                

 

             HDL CHOLESTEROL (BEAKER) (test code 22 mg/dL                       

        



             = 976)                                              



Calculated LDL not valid if triglyceride &gt;400 mg/dLTriglyceride Reference 
Range: Low Risk &lt;150Borderline 150-199 High Risk 200-499 Very High Risk 
&gt;=500Cholesterol Reference Range: Low Risk &lt;200 Borderline 200-239 High 
Risk &gt;240HDL Cholesterol Reference Range: Low Risk &gt;=60 High Risk&lt;40LDL
Cholesterol Reference Range: Optimal &lt;100 Near Optimal 100-129 Borderline 
130-159 High 160-189 Very High  &gt;=190POCT-GLUCOSE [id number] 00:49:00





             Test Item    Value        Reference Range Interpretation Comments

 

             POC-GLUCOSE METER 399 mg/dL           H            : TESTED A

[facility]



             (BEAKER) (test code =                                        [place],



             1538)                                               [address]:



                                                                 /Techni

christina ID



                                                                 = [id number] for CLAY BATRES, FOOT, 2 VIEWS, [id number] 22:28:00Reason for exam:-&gt;osteomyletitis
FINAL REPORT PATIENT ID: [id number] TECHNIQUE: Two views each of the bilateral 
feet HISTORY: osteomyletitis. COMPARISON: None. IMPRESSION:No acute displaced 
fracture or dislocation. Joint spaces are within normal limits.Severe soft 
tissue swelling.On the right subcentimeter nonspecific calcifications adjacent 
to the heel plantar aspect. Correlate with physical exam. Severely limited exam 
due to patientbody habitus. No definite cortical irregularity.There is clinical 
concern for osteomyelitis, recommend MRI with contrast. Signed: Sriram Valera 
MDReport Verified Date/Time: 2020 22:28:25 Reading Location: [facility] 
Consult Reading Room Electronically signed by: SRIRAM VALERA DO on
2020 10:28 PMRAD, FOOT, 2 VIEWS, [id number] 22:28:00Reason for 
exam:-&gt;osteomyletitsFINAL REPORT PATIENT ID: [id number] TECHNIQUE: Two views 
each of the bilateral feet HISTORY: osteomyletitis. COMPARISON: None. 
IMPRESSION:No acute displaced fracture or dislocation. Joint spaces are within 
normal limits.Severe soft tissue swelling.On the right subcentimeter nonspecific
calcifications adjacent to the heel plantar aspect. Correlate with physical 
exam. Severely limited exam due to patientbody habitus. No definite cortical 
irregularity.There is clinical concern for osteomyelitis, recommend MRI with 
contrast. Signed: Sriram Valera MDReport Verified Date/Time: 2020 22:28:25
Reading Location: [address] Consult Reading Room Electronically signed by: 
SRIRAM VALERA DO on2020 10:28 PMPOCT-GLUCOSE [id number] 
21:04:00





             Test Item    Value        Reference Range Interpretation Comments

 

             POC-GLUCOSE METER 430 mg/dL           HH           : Notified

 RN/MD:



             (KUN) (test code =                                        TESTED

 AT [facility] [address]



             1538)                                               [facility],



                                                                 [address]:



                                                                 /Techni

christina ID



                                                                 = [id number] for DEYSI ADRIAN



ERTAPENEM:SUSC:PT:ISOLATE:ORDQN:[id number] 16:48:00





             Test Item    Value        Reference Range Interpretation Comments

 

             Culture: Urine (test >100,000 CFU/mL Proteus                       

    



             code = Culture: mirabilis 10,000 -                           



             Urine)       50,000 CFU/mL Skin Jaime                           



[facility]ann[facility]:SUSC:PT:ISOLATE:ORDQN:[id number] 16:48:00





             Test Item    Value        Reference Range Interpretation Comments

 

             Proteus mirabilis (test Proteus mirabilis                          

 



             code = Proteus mirabilis)                                        



[facility] [id number] 16:48:00





             Test Item    Value        Reference Range Interpretation Comments

 

             UA Nitrite (test code Negative (18 10:48                      

     



             = UA Nitrite) AM)                                    



[facility] [id number] 16:48:00





             Test Item    Value        Reference Range Interpretation Comments

 

             UA Bili (test code = Negative *NA*(18                         

  



             UA Bili)     10:48 AM)                              



[facility] [id number] 16:48:00





             Test Item    Value        Reference Range Interpretation Comments

 

             UA Ketones (test code Negative *NA*(18                        

   



             = UA Ketones) 10:48 AM)                              



[facility] AND [id number] 16:48:00





             Test Item    Value        Reference Range Interpretation Comments

 

             UA Blood (test code = Trace *ABN*(18                          

 



             UA Blood)    10:48 AM)                              



[facility] AND [id number] 16:48:00





             Test Item    Value        Reference Range Interpretation Comments

 

             UA Urobilinogen (test code = UA 0.2          0.1-1.0               

    



             Urobilinogen)                                        



[facility] AND [id number] 16:48:00





             Test Item    Value        Reference Range Interpretation Comments

 

             UA Leuk Est (test code Large *ABN*(18                         

  



             = UA Leuk Est) 10:48 AM)                              



[facility] AND [id number] 16:48:00





             Test Item    Value        Reference Range Interpretation Comments

 

             UA Protein (test code Negative (18 10:48                      

     



             = UA Protein) AM)                                    



[facility] AND [id number] 16:48:00





             Test Item    Value        Reference Range Interpretation Comments

 

             UA Glucose (test code Negative (18 10:48                      

     



             = UA Glucose) AM)                                    



[facility] AND [id number] 16:48:00





             Test Item    Value        Reference Range Interpretation Comments

 

             UA pH (test code = UA pH) 7.0 1        5.0-8.0                   



[facility] AND [id number] 16:48:00





             Test Item    Value        Reference Range Interpretation Comments

 

             UA Spec Grav (test code = UA Spec 1.015 1                          

      



             Grav)                                               



[facility] AND [id number] 16:48:00





             Test Item    Value        Reference Range Interpretation Comments

 

             UA Color (test code = Yellow *NA*(18                          

 



             UA Color)    10:48 AM)                              



[facility] AND [id number] 16:48:00





             Test Item    Value        Reference Range Interpretation Comments

 

             UA Turbidity (test code = Clear (18 10:48                     

      



             UA Turbidity) AM)                                    



[facility] AND [id number] 16:48:00





             Test Item    Value        Reference Range Interpretation Comments

 

             UA Mucus (test code = UA Mucus) Few /LPF                           

    



[facility] [id number] 16:48:00





             Test Item    Value        Reference Range Interpretation Comments

 

             UA Bacteria (test code = UA Few /HPF                               



             Bacteria)                                           



[facility] [id number] 16:48:00





             Test Item    Value        Reference Range Interpretation Comments

 

             UA RBC (test code = 0-2 /HPF     See_Comment                [Automa

huma message] The



             UA RBC)                                             system which ge

nerated



                                                                 this result tra

nsmitted



                                                                 reference range

: <=2. The



                                                                 reference range

 was not



                                                                 used to interpr

et this



                                                                 result as zayda

l/abnormal.



[facility] AND [id number] 16:48:00





             Test Item    Value        Reference Range Interpretation Comments

 

             UA Sq Epi (test code = None Seen (18                          

 



             UA Sq Epi)   10:48 AM)                              



[facility] AND [id number] 16:48:00





             Test Item    Value        Reference Range Interpretation Comments

 

             UA WBC (test code = UA WBC)  /HPF                            



[facility]OLATE:ORDQN:[id number] 16:48:00





             Test Item    Value        Reference Range Interpretation Comments

 

             Culture: Urine (test >100,000 CFU/mL Proteus                       

    



             code = Culture: mirabilis 10,000 -                           



             Urine)       50,000 CFU/mL Skin Jaime                           



[facility]:PT:ISOLATE:ORDQN:[id number] 16:48:00





             Test Item    Value        Reference Range Interpretation Comments

 

             Proteus mirabilis (test Proteus mirabilis                          

 



             code = Proteus mirabilis)                                        



[facility] AND [id number] 16:48:00





             Test Item    Value        Reference Range Interpretation Comments

 

             UA Nitrite (test code Negative (18 10:48                      

     



             = UA Nitrite) AM)                                    



[facility] AND [id number] 16:48:00





             Test Item    Value        Reference Range Interpretation Comments

 

             UA Bili (test code = Negative *NA*(18                         

  



             UA Bili)     10:48 AM)                              



[facility] AND [id number] 16:48:00





             Test Item    Value        Reference Range Interpretation Comments

 

             UA Ketones (test code Negative *NA*(18                        

   



             = UA Ketones) 10:48 AM)                              



[facility] AND [id number] 16:48:00





             Test Item    Value        Reference Range Interpretation Comments

 

             UA Blood (test code = Trace *ABN*(18                          

 



             UA Blood)    10:48 AM)                              



[facility] AND [id number] 16:48:00





             Test Item    Value        Reference Range Interpretation Comments

 

             UA Urobilinogen (test code = UA 0.2          0.1-1.0               

    



             Urobilinogen)                                        



[facility] AND [id number] 16:48:00





             Test Item    Value        Reference Range Interpretation Comments

 

             UA Leuk Est (test code Large *ABN*(18                         

  



             = UA Leuk Est) 10:48 AM)                              



[facility] AND [id number] 16:48:00





             Test Item    Value        Reference Range Interpretation Comments

 

             UA Protein (test code Negative (18 10:48                      

     



             = UA Protein) AM)                                    



[facility] AND [id number] 16:48:00





             Test Item    Value        Reference Range Interpretation Comments

 

             UA Glucose (test code Negative (18 10:48                      

     



             = UA Glucose) AM)                                    



[facility] AND [id number] 16:48:00





             Test Item    Value        Reference Range Interpretation Comments

 

             UA pH (test code = UA pH) 7.0 1        5.0-8.0                   



[facility] AND [id number] 16:48:00





             Test Item    Value        Reference Range Interpretation Comments

 

             UA Spec Grav (test code = UA Spec 1.015 1                          

      



             Grav)                                               



[facility] AND [id number] 16:48:00





             Test Item    Value        Reference Range Interpretation Comments

 

             UA Color (test code = Yellow *NA*(18                          

 



             UA Color)    10:48 AM)                              



[facility] AND [id number] 16:48:00





             Test Item    Value        Reference Range Interpretation Comments

 

             UA Turbidity (test code = Clear (18 10:48                     

      



             UA Turbidity) AM)                                    



[facility] AND [id number] 16:48:00





             Test Item    Value        Reference Range Interpretation Comments

 

             UA Mucus (test code = UA Mucus) Few /LPF                           

    



[facility] AND [id number] 16:48:00





             Test Item    Value        Reference Range Interpretation Comments

 

             UA Bacteria (test code = UA Few /HPF                               



             Bacteria)                                           



[facility] AND [id number] 16:48:00





             Test Item    Value        Reference Range Interpretation Comments

 

             UA RBC (test code = 0-2 /HPF     See_Comment                [Automa

huma message] The



             UA RBC)                                             system which ge

nerated



                                                                 this result tra

nsmitted



                                                                 reference range

: <=2. The



                                                                 reference range

 was not



                                                                 used to interpr

et this



                                                                 result as zayda

l/abnormal.



[facility] AND [id number] 16:48:00





             Test Item    Value        Reference Range Interpretation Comments

 

             UA Sq Epi (test code = None Seen (18                          

 



             UA Sq Epi)   10:48 AM)                              



[facility] AND [id number] 16:48:00





             Test Item    Value        Reference Range Interpretation Comments

 

             UA WBC (test code = UA WBC)  /HPF                            



[facility]:ORDQN:[id number] 16:48:00





             Test Item    Value        Reference Range Interpretation Comments

 

             Culture: Urine (test >100,000 CFU/mL Proteus                       

    



             code = Culture: mirabilis 10,000 -                           



             Urine)       50,000 CFU/mL Skin Jaime                           



[facility]:ISOLATE:ORDQN:[id number] 16:48:00





             Test Item    Value        Reference Range Interpretation Comments

 

             Proteus mirabilis (test Proteus mirabilis                          

 



             code = Proteus mirabilis)                                        



[facility] AND [id number] 16:48:00





             Test Item    Value        Reference Range Interpretation Comments

 

             UA Nitrite (test code Negative (18 10:48                      

     



             = UA Nitrite) AM)                                    



[facility] AND [id number] 16:48:00





             Test Item    Value        Reference Range Interpretation Comments

 

             UA Bili (test code = Negative *NA*(18                         

  



             UA Bili)     10:48 AM)                              



[facility] AND [id number] 16:48:00





             Test Item    Value        Reference Range Interpretation Comments

 

             UA Ketones (test code Negative *NA*(18                        

   



             = UA Ketones) 10:48 AM)                              



[facility] AND [id number] 16:48:00





             Test Item    Value        Reference Range Interpretation Comments

 

             UA Blood (test code = Trace *ABN*(18                          

 



             UA Blood)    10:48 AM)                              



[facility] AND [id number] 16:48:00





             Test Item    Value        Reference Range Interpretation Comments

 

             UA Urobilinogen (test code = UA 0.2          0.1-1.0               

    



             Urobilinogen)                                        



[facility] AND [id number] 16:48:00





             Test Item    Value        Reference Range Interpretation Comments

 

             UA Leuk Est (test code Large *ABN*(18                         

  



             = UA Leuk Est) 10:48 AM)                              



[facility] AND [id number] 16:48:00





             Test Item    Value        Reference Range Interpretation Comments

 

             UA Protein (test code Negative (18 10:48                      

     



             = UA Protein) AM)                                    



[facility] AND [id number] 16:48:00





             Test Item    Value        Reference Range Interpretation Comments

 

             UA Glucose (test code Negative (18 10:48                      

     



             = UA Glucose) AM)                                    



[facility] AND [id number] 16:48:00





             Test Item    Value        Reference Range Interpretation Comments

 

             UA pH (test code = UA pH) 7.0 1        5.0-8.0                   



[facility] AND [id number] 16:48:00





             Test Item    Value        Reference Range Interpretation Comments

 

             UA Spec Grav (test code = UA Spec 1.015 1                          

      



             Grav)                                               



[facility] AND [id number] 16:48:00





             Test Item    Value        Reference Range Interpretation Comments

 

             UA Color (test code = Yellow *NA*(18                          

 



             UA Color)    10:48 AM)                              



[facility] AND [id number] 16:48:00





             Test Item    Value        Reference Range Interpretation Comments

 

             UA Turbidity (test code = Clear (18 10:48                     

      



             UA Turbidity) AM)                                    



[facility] AND [id number] 16:48:00





             Test Item    Value        Reference Range Interpretation Comments

 

             UA Mucus (test code = UA Mucus) Few /LPF                           

    



[facility] AND [id number] 16:48:00





             Test Item    Value        Reference Range Interpretation Comments

 

             UA Bacteria (test code = UA Few /HPF                               



             Bacteria)                                           



[facility] AND [id number] 16:48:00





             Test Item    Value        Reference Range Interpretation Comments

 

             UA RBC (test code = 0-2 /HPF     See_Comment                [Automa

huma message] The



             UA RBC)                                             system which ge

nerated



                                                                 this result tra

nsmitted



                                                                 reference range

: <=2. The



                                                                 reference range

 was not



                                                                 used to interpr

et this



                                                                 result as zayda

l/abnormal.



[facility] AND [id number] 16:48:00





             Test Item    Value        Reference Range Interpretation Comments

 

             UA Sq Epi (test code = None Seen (18                          

 



             UA Sq Epi)   10:48 AM)                              



[facility] AND [id number] 16:48:00





             Test Item    Value        Reference Range Interpretation Comments

 

             UA WBC (test code = UA WBC)  /HPF                            



[facility]:ORDQN:[id number] 16:48:00





             Test Item    Value        Reference Range Interpretation Comments

 

             Culture: Urine (test >100,000 CFU/mL Proteus                       

    



             code = Culture: mirabilis 10,000 -                           



             Urine)       50,000 CFU/mL Skin Jaime                           



[facility]:ISOLATE:ORDQN:[id number] 16:48:00





             Test Item    Value        Reference Range Interpretation Comments

 

             Proteus mirabilis (test Proteus mirabilis                          

 



             code = Proteus mirabilis)                                        



[facility] AND [id number] 16:48:00





             Test Item    Value        Reference Range Interpretation Comments

 

             UA Nitrite (test code Negative (18 10:48                      

     



             = UA Nitrite) AM)                                    



[facility] AND [id number] 16:48:00





             Test Item    Value        Reference Range Interpretation Comments

 

             UA Bili (test code = Negative *NA*(18                         

  



             UA Bili)     10:48 AM)                              



[facility] AND [id number] 16:48:00





             Test Item    Value        Reference Range Interpretation Comments

 

             UA Ketones (test code Negative *NA*(18                        

   



             = UA Ketones) 10:48 AM)                              



[facility] AND [id number] 16:48:00





             Test Item    Value        Reference Range Interpretation Comments

 

             UA Blood (test code = Trace *ABN*(18                          

 



             UA Blood)    10:48 AM)                              



[facility] AND [id number] 16:48:00





             Test Item    Value        Reference Range Interpretation Comments

 

             UA Urobilinogen (test code = UA 0.2          0.1-1.0               

    



             Urobilinogen)                                        



[facility] AND [id number] 16:48:00





             Test Item    Value        Reference Range Interpretation Comments

 

             UA Leuk Est (test code Large *ABN*(18                         

  



             = UA Leuk Est) 10:48 AM)                              



[facility] AND [id number] 16:48:00





             Test Item    Value        Reference Range Interpretation Comments

 

             UA Protein (test code Negative (18 10:48                      

     



             = UA Protein) AM)                                    



[facility] AND [id number] 16:48:00





             Test Item    Value        Reference Range Interpretation Comments

 

             UA Glucose (test code Negative (18 10:48                      

     



             = UA Glucose) AM)                                    



[facility] AND [id number] 16:48:00





             Test Item    Value        Reference Range Interpretation Comments

 

             UA pH (test code = UA pH) 7.0 1        5.0-8.0                   



[facility] AND [id number] 16:48:00





             Test Item    Value        Reference Range Interpretation Comments

 

             UA Spec Grav (test code = UA Spec 1.015 1                          

      



             Grav)                                               



[facility] AND [id number] 16:48:00





             Test Item    Value        Reference Range Interpretation Comments

 

             UA Color (test code = Yellow *NA*(18                          

 



             UA Color)    10:48 AM)                              



[facility] AND [id number] 16:48:00





             Test Item    Value        Reference Range Interpretation Comments

 

             UA Turbidity (test code = Clear (18 10:48                     

      



             UA Turbidity) AM)                                    



[facility] AND [id number] 16:48:00





             Test Item    Value        Reference Range Interpretation Comments

 

             UA Mucus (test code = UA Mucus) Few /LPF                           

    



[facility] AND [id number] 16:48:00





             Test Item    Value        Reference Range Interpretation Comments

 

             UA Bacteria (test code = UA Few /HPF                               



             Bacteria)                                           



[facility] AND [id number] 16:48:00





             Test Item    Value        Reference Range Interpretation Comments

 

             UA RBC (test code = 0-2 /HPF     See_Comment                [Automa

huma message] The



             UA RBC)                                             system which ge

nerated



                                                                 this result tra

nsmitted



                                                                 reference range

: <=2. The



                                                                 reference range

 was not



                                                                 used to interpr

et this



                                                                 result as zayda

l/abnormal.



[facility] AND [id number] 16:48:00





             Test Item    Value        Reference Range Interpretation Comments

 

             UA Sq Epi (test code = None Seen (18                          

 



             UA Sq Epi)   10:48 AM)                              



[facility] AND [id number] 16:48:00





             Test Item    Value        Reference Range Interpretation Comments

 

             UA WBC (test code = UA WBC)  /HPF                            



[facility] [id number] 11:57:00





             Test Item    Value        Reference Range Interpretation Comments

 

             Antibody Scrn (test Negative (18 5:57                         

  



             code = Antibody Scrn) AM)                                    



[facility] [id number] 11:57:00





             Test Item    Value        Reference Range Interpretation Comments

 

             ABO/Rh (test code = ABO/Rh) AB POS                                 



[id number] 11:57:00





             Test Item    Value        Reference Range Interpretation Comments

 

             PTT (test code = PTT) 33.4 s       22.9-35.8                 



[facility] [id number] 11:57:00





             Test Item    Value        Reference Range Interpretation Comments

 

             PT (test code = PT) 13.7 s       12.0-14.7                 



[facility] [id number] 11:57:00





             Test Item    Value        Reference Range Interpretation Comments

 

             INR (test code = INR) 1.05 1       0.85-1.17                 



[facility] [id number] 11:57:00





             Test Item    Value        Reference Range Interpretation Comments

 

             Antibody Scrn (test Negative (18 5:57                         

  



             code = Antibody Scrn) AM)                                    



[facility] [id number] 11:57:00





             Test Item    Value        Reference Range Interpretation Comments

 

             ABO/Rh (test code = ABO/Rh) AB POS                                 



[facility] [id number] 11:57:00





             Test Item    Value        Reference Range Interpretation Comments

 

             PTT (test code = PTT) 33.4 s       22.9-35.8                 



[facility] CenterCoitjcuTKPTGRDKWU6997-12-40 11:57:00





             Test Item    Value        Reference Range Interpretation Comments

 

             PT (test code = PT) 13.7 s       12.0-14.7                 



[facility] CenterTtqrfipHQKFOHEBVK4375-84-08 11:57:00





             Test Item    Value        Reference Range Interpretation Comments

 

             INR (test code = INR) 1.05 1       0.85-1.17                 



[facility] [id number] 11:57:00





             Test Item    Value        Reference Range Interpretation Comments

 

             Antibody Scrn (test Negative (18 5:57                         

  



             code = Antibody Scrn) AM)                                    



[facility] [id number] 11:57:00





             Test Item    Value        Reference Range Interpretation Comments

 

             ABO/Rh (test code = ABO/Rh) AB POS                                 



[facility]XMCEQU9873[facility]





             Test Item    Value        Reference Range Interpretation Comments

 

             PTT (test code = PTT) 33.4 s       22.9-35.8                 



[facility] CenterKjouefrERODKGWUQU4544-40-86 11:57:00





             Test Item    Value        Reference Range Interpretation Comments

 

             PT (test code = PT) 13.7 s       12.0-14.7                 



[facility] CenterWafvpmlIJDMJFVMXW4205-09-23 11:57:00





             Test Item    Value        Reference Range Interpretation Comments

 

             INR (test code = INR) 1.05 1       0.85-1.17                 



[facility] [employer] [id number] 11:57:00





             Test Item    Value        Reference Range Interpretation Comments

 

             Antibody Scrn (test Negative (18 5:57                         

  



             code = Antibody Scrn) AM)                                    



[facility] [employer] [id number] 11:57:00





             Test Item    Value        Reference Range Interpretation Comments

 

             ABO/Rh (test code = ABO/Rh) AB POS                                 



[facility] CenterCzffrcrFDDRXZQKRV7326-83-74 11:57:00





             Test Item    Value        Reference Range Interpretation Comments

 

             PTT (test code = PTT) 33.4 s       22.9-35.8                 



[facility] CenterYwmweoiATWPFMXDPO3942-94-72 11:57:00





             Test Item    Value        Reference Range Interpretation Comments

 

             PT (test code = PT) 13.7 s       12.0-14.7                 



[facility] CenterKstngcpNVMNPLCTXM5891-81-19 11:57:00





             Test Item    Value        Reference Range Interpretation Comments

 

             INR (test code = INR) 1.05 1       0.85-1.17                 



[facility] [employer] [id number] 10:50:01





             Test Item    Value        Reference Range Interpretation Comments

 

             eGFR (test code = eGFR) 133                                    



[facility] [id number] 10:50:01





             Test Item    Value        Reference Range Interpretation Comments

 

             Calcium Lvl (test code = Calcium Lvl) 9.4          8.5-10.5        

          



[facility] [employer] [id number] 10:50:01





             Test Item    Value        Reference Range Interpretation Comments

 

             CO2 (test code = CO2) 28           24-32                     



[facility] [id number] 10:50:01





             Test Item    Value        Reference Range Interpretation Comments

 

             BUN (test code = BUN) 14           7-22                      



[facility] [id number] 10:50:01





             Test Item    Value        Reference Range Interpretation Comments

 

             Glucose Lvl (test code = Glucose Lvl) 89           70-99           

          



[facility]2018-11-08 10:50:01





             Test Item    Value        Reference Range Interpretation Comments

 

             Chloride Lvl (test code = Chloride Lvl) 104                  

            



[facility]2018-11-08 10:50:01





             Test Item    Value        Reference Range Interpretation Comments

 

             Potassium Lvl (test code = Potassium 4.2          3.5-5.1          

         



             Lvl)                                                



[facility]2018-11-08 10:50:01





             Test Item    Value        Reference Range Interpretation Comments

 

             Sodium Lvl (test code = Sodium Lvl) 137          135-145           

        



[facility]2018-11-08 10:50:01





             Test Item    Value        Reference Range Interpretation Comments

 

             Creatinine Lvl (test code = Creatinine 0.85         0.50-1.40      

           



             Lvl)                                                



[facility]2018-11-08 10:50:01





             Test Item    Value        Reference Range Interpretation Comments

 

             AGAP (test code = AGAP) 9.2          10.0-20.0                 



[facility] CenterTrilzlxHCHLUNXHQH6434-49-33 10:50:01





             Test Item    Value        Reference Range Interpretation Comments

 

             ACT (TEG) Rapid (test code = ACT (TEG) 136 s                 

           



             Rapid)                                              



[facility] CenterCfbhabuXOGYBNIQGY4483-02-87 10:50:01





             Test Item    Value        Reference Range Interpretation Comments

 

             Split Point Rapid (test code = Split 0.6 min                       

         



             Point Rapid)                                        



[facility] CenterDjyeipnMYEOSWEYJX6532-09-40 10:50:01





             Test Item    Value        Reference Range Interpretation Comments

 

             R-time Rapid (test code = R-time 0.9 min      0.4-0.7              

     



             Rapid)                                              



[facility] CenterPierwmtJECTREQKVJ8365-48-39 10:50:01





             Test Item    Value        Reference Range Interpretation Comments

 

             K-time Rapid (test code = K-time 1.4 min      0.6-2.3              

     



             Rapid)                                              



[facility] CenterOpmoackPOAJHWHIZV2819-55-38 10:50:01





             Test Item    Value        Reference Range Interpretation Comments

 

             Angle Rapid (test code = Angle 71 degrees   64-80                  

   



             Rapid)                                              



[facility] CenterQwafzrnQQXQJZXRXA8828-00-27 10:50:01





             Test Item    Value        Reference Range Interpretation Comments

 

             G-value Rapid (test code = G-value 12.7         5.0-11.6           

       



             Rapid)                                              



[facility] CenterFkchkglSHIBREKPZA9728-01-10 10:50:01





             Test Item    Value        Reference Range Interpretation Comments

 

             Max Amplitude Rapid (test code = Max 72 mm        52-71            

         



             Amplitude Rapid)                                        



[facility] CenterItgyintUWHYVCBNVJ1941-39-43 10:50:01





             Test Item    Value        Reference Range Interpretation Comments

 

             Estimated % Lysis Rapid 0.1          See_Comment                [Au

tomated message] The



             (test code = Estimated                                        syste

m which generated



             % Lysis Rapid)                                        this result t

ransmitted



                                                                 reference range

: <=7.5.



                                                                 The reference r

zhen was



                                                                 not used to int

erpret



                                                                 this result as



                                                                 normal/abnormal

.



[facility] CenterDodignzOXBNUTJQBL5800-57-13 10:50:01





             Test Item    Value        Reference Range Interpretation Comments

 

             Platelet (test code = Platelet) 367          133-450               

    



[facility] CenterBynmbmyVRIUJVMEKC5056-12-30 10:50:01





             Test Item    Value        Reference Range Interpretation Comments

 

             MPV (test code = MPV) 7.8          7.4-10.4                  



[facility] CenterUlmiheqRTFWHJPRNS6637-90-83 10:50:01





             Test Item    Value        Reference Range Interpretation Comments

 

             MCH (test code = MCH) 27.4 pg      27.0-31.0                 



[facility] CenterGshgcekQLEWTEMGEV7694-29-90 10:50:01





             Test Item    Value        Reference Range Interpretation Comments

 

             MCV (test code = MCV) 80.7         80.0-94.0                 



[facility] CenterBakpsdjCFDMZWHATT9505-82-73 10:50:01





             Test Item    Value        Reference Range Interpretation Comments

 

             MCHC (test code = MCHC) 34.0         32.0-36.0                 



[facility] CenterEjivujuMWPABTQBSW1232-83-11 10:50:01





             Test Item    Value        Reference Range Interpretation Comments

 

             RDW (test code = RDW) 18.9         11.5-14.5                 



[facility] CenterGxxnzlcYPGDTRNLQI5736-12-09 10:50:01





             Test Item    Value        Reference Range Interpretation Comments

 

             Hct (test code = Hct) 43.3         42.0-54.0                 



[facility] CenterAuylerbKAAEEDFBCN7340-99-87 10:50:01





             Test Item    Value        Reference Range Interpretation Comments

 

             WBC (test code = WBC) 9.3          3.7-10.4                  



[facility] CenterQxjdmrgXKTHRUTHJB0859-55-68 10:50:01





             Test Item    Value        Reference Range Interpretation Comments

 

             Hgb (test code = Hgb) 14.7         14.0-18.0                 



[facility] CenterAwiwpdhEYECJYUKVT3984-14-11 10:50:01





             Test Item    Value        Reference Range Interpretation Comments

 

             RBC (test code = RBC) 5.36         4.70-6.10                 



[facility] CenterWgdcqqzWWUKQZQRQX5754-39-67 10:50:01





             Test Item    Value        Reference Range Interpretation Comments

 

             Eosinophils # (test code 0.2          See_Comment                [A

utomated message] The



             = Eosinophils #)                                        system whic

h generated



                                                                 this result tra

nsmitted



                                                                 reference range

: <=0.5.



                                                                 The reference r

zhen was



                                                                 not used to int

erpret



                                                                 this result as



                                                                 normal/abnormal

.



[facility] CenterBaqvneeAFNNINSIYE8924-26-00 10:50:01





             Test Item    Value        Reference Range Interpretation Comments

 

             Basophils # (test code 0.1          See_Comment                [Aut

omated message] The



             = Basophils #)                                        system which 

generated



                                                                 this result tra

nsmitted



                                                                 reference range

: <=0.2.



                                                                 The reference r

zhen was



                                                                 not used to int

erpret



                                                                 this result as



                                                                 normal/abnormal

.



[facility] CenterBhbvvojQDSGJYTPNL2322-72-10 10:50:01





             Test Item    Value        Reference Range Interpretation Comments

 

             Lymphocytes # (test code = Lymphocytes 1.8          1.0-5.5        

           



             #)                                                  



[facility] CenterRwwidqpGRANYFZVLG2485-34-87 10:50:01





             Test Item    Value        Reference Range Interpretation Comments

 

             Monocytes # (test code 0.9          See_Comment                [Aut

omated message] The



             = Monocytes #)                                        system which 

generated



                                                                 this result tra

nsmitted



                                                                 reference range

: <=0.8.



                                                                 The reference r

zhen was



                                                                 not used to int

erpret



                                                                 this result as



                                                                 normal/abnormal

.



[facility] CenterMwoqvtuBYPEWJPNED3513-39-94 10:50:01





             Test Item    Value        Reference Range Interpretation Comments

 

             Neutrophils # (test code = Neutrophils 6.3          1.5-8.1        

           



             #)                                                  



[facility] CenterAmxtzxkSSTPVSKAEE2117-41-64 10:50:01





             Test Item    Value        Reference Range Interpretation Comments

 

             Eosinophils (test code = 2.0          See_Comment                [A

utomated message] The



             Eosinophils)                                        system which ge

nerated



                                                                 this result tra

nsmitted



                                                                 reference range

: <=4.0.



                                                                 The reference r

zhen was



                                                                 not used to int

erpret



                                                                 this result as



                                                                 normal/abnormal

.



[facility] CenterXldhadkJGWNCSIPMO9527-11-47 10:50:01





             Test Item    Value        Reference Range Interpretation Comments

 

             Segs (test code = Segs) 67.4         45.0-75.0                 



[facility] CenterKkqgsfmAEEMWFUUVS3920-50-92 10:50:01





             Test Item    Value        Reference Range Interpretation Comments

 

             Lymphocytes (test code = Lymphocytes) 19.9         20.0-40.0       

          



[facility] CenterQgdlflrRTMDFSAWVQ8243-75-16 10:50:01





             Test Item    Value        Reference Range Interpretation Comments

 

             Basophils (test code = 1.0          See_Comment                [Aut

omated message] The



             Basophils)                                          system which ge

nerated



                                                                 this result tra

nsmitted



                                                                 reference range

: <=1.0.



                                                                 The reference r

zhen was



                                                                 not used to int

erpret



                                                                 this result as



                                                                 normal/abnormal

.



[facility] CenterFvblsnbZEDSSQNFKL0419-59-83 10:50:01





             Test Item    Value        Reference Range Interpretation Comments

 

             Monocytes (test code = Monocytes) 9.7          2.0-12.0            

      



[facility]2018-11-08 10:50:01





             Test Item    Value        Reference Range Interpretation Comments

 

             eGFR (test code = eGFR) 133                                    



[facility]2018-11-08 10:50:01





             Test Item    Value        Reference Range Interpretation Comments

 

             Calcium Lvl (test code = Calcium Lvl) 9.4          8.5-10.5        

          



[facility]2018-11-08 10:50:01





             Test Item    Value        Reference Range Interpretation Comments

 

             CO2 (test code = CO2) 28           24-32                     



[facility]2018-11-08 10:50:01





             Test Item    Value        Reference Range Interpretation Comments

 

             BUN (test code = BUN) 14           7-22                      



[facility]2018-11-08 10:50:01





             Test Item    Value        Reference Range Interpretation Comments

 

             Glucose Lvl (test code = Glucose Lvl) 89           70-99           

          



[facility]2018-11-08 10:50:01





             Test Item    Value        Reference Range Interpretation Comments

 

             Chloride Lvl (test code = Chloride Lvl) 104                  

            



[facility]2018-11-08 10:50:01





             Test Item    Value        Reference Range Interpretation Comments

 

             Potassium Lvl (test code = Potassium 4.2          3.5-5.1          

         



             Lvl)                                                



[facility]2018-11-08 10:50:01





             Test Item    Value        Reference Range Interpretation Comments

 

             Sodium Lvl (test code = Sodium Lvl) 137          135-145           

        



[facility]2018-11-08 10:50:01





             Test Item    Value        Reference Range Interpretation Comments

 

             Creatinine Lvl (test code = Creatinine 0.85         0.50-1.40      

           



             Lvl)                                                



[facility]2018-11-08 10:50:01





             Test Item    Value        Reference Range Interpretation Comments

 

             AGAP (test code = AGAP) 9.2          10.0-20.0                 



[facility] CenterKnltbmjHYBALKMUWT2521-94-99 10:50:01





             Test Item    Value        Reference Range Interpretation Comments

 

             ACT (TEG) Rapid (test code = ACT (TEG) 136 s                 

           



             Rapid)                                              



[facility] CenterTfknhyvMSUDPFBCSU6268-20-98 10:50:01





             Test Item    Value        Reference Range Interpretation Comments

 

             Split Point Rapid (test code = Split 0.6 min                       

         



             Point Rapid)                                        



[facility] CenterTbqsainEGBBAMHHDE2725-75-44 10:50:01





             Test Item    Value        Reference Range Interpretation Comments

 

             R-time Rapid (test code = R-time 0.9 min      0.4-0.7              

     



             Rapid)                                              



[facility] CenterGftmxkeRTAJDLPCQW7634-11-43 10:50:01





             Test Item    Value        Reference Range Interpretation Comments

 

             K-time Rapid (test code = K-time 1.4 min      0.6-2.3              

     



             Rapid)                                              



[place] [address]-11-08 10:50:01





             Test Item    Value        Reference Range Interpretation Comments

 

             Angle Rapid (test code = Angle 71 degrees   64-80                  

   



             Rapid)                                              



[facility] CenterDzeqxfwFDXGAFAHMB5994-27-81 10:50:01





             Test Item    Value        Reference Range Interpretation Comments

 

             G-value Rapid (test code = G-value 12.7         5.0-11.6           

       



             Rapid)                                              



[facility] CenterMvobskbWTATJYBFBW3665-91-07 10:50:01





             Test Item    Value        Reference Range Interpretation Comments

 

             Max Amplitude Rapid (test code = Max 72 mm        52-71            

         



             Amplitude Rapid)                                        



[facility] CenterZqhgzyqHFFHRBGRJZ7502-20-65 10:50:01





             Test Item    Value        Reference Range Interpretation Comments

 

             Estimated % Lysis Rapid 0.1          See_Comment                [Au

tomated message] The



             (test code = Estimated                                        syste

m which generated



             % Lysis Rapid)                                        this result t

ransmitted



                                                                 reference range

: <=7.5.



                                                                 The reference r

zhen was



                                                                 not used to int

erpret



                                                                 this result as



                                                                 normal/abnormal

.



[facility] CenterUfxhxktJLMNALQDOC9302-17-90 10:50:01





             Test Item    Value        Reference Range Interpretation Comments

 

             Platelet (test code = Platelet) 367          133-450               

    



[facility] CenterCdfdjluBXWQGCBBSW1927-78-22 10:50:01





             Test Item    Value        Reference Range Interpretation Comments

 

             MPV (test code = MPV) 7.8          7.4-10.4                  



[facility] CenterSrgznvePJJAUFJORA3326-72-52 10:50:01





             Test Item    Value        Reference Range Interpretation Comments

 

             MCH (test code = MCH) 27.4 pg      27.0-31.0                 



[facility] CenterPptydknMGYYMTQTWV1528-82-94 10:50:01





             Test Item    Value        Reference Range Interpretation Comments

 

             MCV (test code = MCV) 80.7         80.0-94.0                 



[facility] CenterNcvuwnsGCTWCAAYEM0361-42-58 10:50:01





             Test Item    Value        Reference Range Interpretation Comments

 

             MCHC (test code = MCHC) 34.0         32.0-36.0                 



[facility] CenterBzwikzpDTNMQXSVEV9631-78-95 10:50:01





             Test Item    Value        Reference Range Interpretation Comments

 

             RDW (test code = RDW) 18.9         11.5-14.5                 



[facility] CenterNwvkmqfSWZSPNIOLF8579-37-89 10:50:01





             Test Item    Value        Reference Range Interpretation Comments

 

             Hct (test code = Hct) 43.3         42.0-54.0                 



[facility] CenterRmywbyhENWJLFJBBI4077-07-07 10:50:01





             Test Item    Value        Reference Range Interpretation Comments

 

             WBC (test code = WBC) 9.3          3.7-10.4                  



[facility] CenterJqyvthdSKBMMYRARD5020-21-41 10:50:01





             Test Item    Value        Reference Range Interpretation Comments

 

             Hgb (test code = Hgb) 14.7         14.0-18.0                 



[facility] CenterWhqetgePKSKXNHDGA6249-70-14 10:50:01





             Test Item    Value        Reference Range Interpretation Comments

 

             RBC (test code = RBC) 5.36         4.70-6.10                 



[facility] CenterGoddskcSMFKDOOHKN5160-76-90 10:50:01





             Test Item    Value        Reference Range Interpretation Comments

 

             Eosinophils # (test code 0.2          See_Comment                [A

utomated message] The



             = Eosinophils #)                                        system wh

h generated



                                                                 this result tra

nsmitted



                                                                 reference range

: <=0.5.



                                                                 The reference r

zhen was



                                                                 not used to int

erpret



                                                                 this result as



                                                                 normal/abnormal

.



[facility] CenterZkppjeuIQUYEHIDEH3156-99-27 10:50:01





             Test Item    Value        Reference Range Interpretation Comments

 

             Basophils # (test code 0.1          See_Comment                [Aut

omated message] The



             = Basophils #)                                        system which 

generated



                                                                 this result tra

nsmitted



                                                                 reference range

: <=0.2.



                                                                 The reference r

zhen was



                                                                 not used to int

erpret



                                                                 this result as



                                                                 normal/abnormal

.



[facility] CenterSwtsliiDIVPBMEGVZ3511-96-33 10:50:01





             Test Item    Value        Reference Range Interpretation Comments

 

             Lymphocytes # (test code = Lymphocytes 1.8          1.0-5.5        

           



             #)                                                  



[facility] CenterKfyrsgmVRJNNHHACT3105-26-14 10:50:01





             Test Item    Value        Reference Range Interpretation Comments

 

             Monocytes # (test code 0.9          See_Comment                [Aut

omated message] The



             = Monocytes #)                                        system which 

generated



                                                                 this result tra

nsmitted



                                                                 reference range

: <=0.8.



                                                                 The reference r

zhen was



                                                                 not used to int

erpret



                                                                 this result as



                                                                 normal/abnormal

.



[facility] CenterMbhsundFZAFVSZDED1593-49-79 10:50:01





             Test Item    Value        Reference Range Interpretation Comments

 

             Neutrophils # (test code = Neutrophils 6.3          1.5-8.1        

           



             #)                                                  



[facility] CenterAecwzsqAJOPLYPIVN4696-99-29 10:50:01





             Test Item    Value        Reference Range Interpretation Comments

 

             Eosinophils (test code = 2.0          See_Comment                [A

utomated message] The



             Eosinophils)                                        system which ge

nerated



                                                                 this result tra

nsmitted



                                                                 reference range

: <=4.0.



                                                                 The reference r

zhen was



                                                                 not used to int

erpret



                                                                 this result as



                                                                 normal/abnormal

.



[facility] CenterFzkbuvjHQNFMXZJHO7283-49-47 10:50:01





             Test Item    Value        Reference Range Interpretation Comments

 

             Segs (test code = Segs) 67.4         45.0-75.0                 



[facility] CenterMbmbkayHWQJLSTGGR0682-35-65 10:50:01





             Test Item    Value        Reference Range Interpretation Comments

 

             Lymphocytes (test code = Lymphocytes) 19.9         20.0-40.0       

          



[facility] CenterKqednkyLHISBEYUSF3354-75-76 10:50:01





             Test Item    Value        Reference Range Interpretation Comments

 

             Basophils (test code = 1.0          See_Comment                [Aut

omated message] The



             Basophils)                                          system which ge

nerated



                                                                 this result tra

nsmitted



                                                                 reference range

: <=1.0.



                                                                 The reference r

zhen was



                                                                 not used to int

erpret



                                                                 this result as



                                                                 normal/abnormal

.



[facility] CenterXzwqztiGVZIQHKHLO2488-92-33 10:50:01





             Test Item    Value        Reference Range Interpretation Comments

 

             Monocytes (test code = Monocytes) 9.7          2.0-12.0            

      



[facility]2018-11-08 10:50:01





             Test Item    Value        Reference Range Interpretation Comments

 

             eGFR (test code = eGFR) 133                                    



[facility]2018-11-08 10:50:01





             Test Item    Value        Reference Range Interpretation Comments

 

             Calcium Lvl (test code = Calcium Lvl) 9.4          8.5-10.5        

          



[facility]2018-11-08 10:50:01





             Test Item    Value        Reference Range Interpretation Comments

 

             CO2 (test code = CO2) 28           24-32                     



[facility]2018-11-08 10:50:01





             Test Item    Value        Reference Range Interpretation Comments

 

             BUN (test code = BUN) 14           7-22                      



[facility]2018-11-08 10:50:01





             Test Item    Value        Reference Range Interpretation Comments

 

             Glucose Lvl (test code = Glucose Lvl) 89           70-99           

          



[facility]2018-11-08 10:50:01





             Test Item    Value        Reference Range Interpretation Comments

 

             Chloride Lvl (test code = Chloride Lvl) 104                  

            



[facility]2018-11-08 10:50:01





             Test Item    Value        Reference Range Interpretation Comments

 

             Potassium Lvl (test code = Potassium 4.2          3.5-5.1          

         



             Lvl)                                                



[facility]2018-11-08 10:50:01





             Test Item    Value        Reference Range Interpretation Comments

 

             Sodium Lvl (test code = Sodium Lvl) 137          135-145           

        



[facility]2018-11-08 10:50:01





             Test Item    Value        Reference Range Interpretation Comments

 

             Creatinine Lvl (test code = Creatinine 0.85         0.50-1.40      

           



             Lvl)                                                



[facility]2018-11-08 10:50:01





             Test Item    Value        Reference Range Interpretation Comments

 

             AGAP (test code = AGAP) 9.2          10.0-20.0                 



[facility] CenterMnvaqvuZVRXTYTIEO9269-81-62 10:50:01





             Test Item    Value        Reference Range Interpretation Comments

 

             ACT (TEG) Rapid (test code = ACT (TEG) 136 s                 

           



             Rapid)                                              



[facility] CenterDccfllzUDYHURFNUH0570-73-88 10:50:01





             Test Item    Value        Reference Range Interpretation Comments

 

             Split Point Rapid (test code = Split 0.6 min                       

         



             Point Rapid)                                        



[facility] CenterCylobtdZWMAUEGPOL8241-53-51 10:50:01





             Test Item    Value        Reference Range Interpretation Comments

 

             R-time Rapid (test code = R-time 0.9 min      0.4-0.7              

     



             Rapid)                                              



[facility] CenterJzvlgdrAGKWZLOJGO8399-30-03 10:50:01





             Test Item    Value        Reference Range Interpretation Comments

 

             K-time Rapid (test code = K-time 1.4 min      0.6-2.3              

     



             Rapid)                                              



[facility] CenterWpqovqsQIZNGEWCDB3457-95-10 10:50:01





             Test Item    Value        Reference Range Interpretation Comments

 

             Angle Rapid (test code = Angle 71 degrees   64-80                  

   



             Rapid)                                              



[facility] CenterHttxjyeWMWHBUVUQK0922-76-20 10:50:01





             Test Item    Value        Reference Range Interpretation Comments

 

             G-value Rapid (test code = G-value 12.7         5.0-11.6           

       



             Rapid)                                              



[facility] CenterBbktgawNFWFJPKPXK0378-38-78 10:50:01





             Test Item    Value        Reference Range Interpretation Comments

 

             Max Amplitude Rapid (test code = Max 72 mm        52-71            

         



             Amplitude Rapid)                                        



[facility] CenterOfwylrgNELEZSXJTE3031-55-27 10:50:01





             Test Item    Value        Reference Range Interpretation Comments

 

             Estimated % Lysis Rapid 0.1          See_Comment                [Au

tomated message] The



             (test code = Estimated                                        syste

m which generated



             % Lysis Rapid)                                        this result t

ransmitted



                                                                 reference range

: <=7.5.



                                                                 The reference r

zhen was



                                                                 not used to int

erpret



                                                                 this result as



                                                                 normal/abnormal

.



[place] [id number] 10:50:01





             Test Item    Value        Reference Range Interpretation Comments

 

             Platelet (test code = Platelet) 367          133-450               

    



[facility] CenterCsvieptHIPWZHHEBH3071-59-43 10:50:01





             Test Item    Value        Reference Range Interpretation Comments

 

             MPV (test code = MPV) 7.8          7.4-10.4                  



[facility] CenterRbzncwoOTIEFEPSEQ5525-63-64 10:50:01





             Test Item    Value        Reference Range Interpretation Comments

 

             MCH (test code = MCH) 27.4 pg      27.0-31.0                 



[facility] CenterJkmrvhkKHPJRMFDNX2639-05-92 10:50:01





             Test Item    Value        Reference Range Interpretation Comments

 

             MCV (test code = MCV) 80.7         80.0-94.0                 



[facility] CenterVwnqwfzCQMDHINAZH9182-39-71 10:50:01





             Test Item    Value        Reference Range Interpretation Comments

 

             MCHC (test code = MCHC) 34.0         32.0-36.0                 



[facility] CenterZokofniAACSFYIRND7485-03-19 10:50:01





             Test Item    Value        Reference Range Interpretation Comments

 

             RDW (test code = RDW) 18.9         11.5-14.5                 



[facility] CenterZfpasxeBDBPYHQAOK9142-21-26 10:50:01





             Test Item    Value        Reference Range Interpretation Comments

 

             Hct (test code = Hct) 43.3         42.0-54.0                 



[facility] CenterWisilpmWQPAYVAJDH9212-86-46 10:50:01





             Test Item    Value        Reference Range Interpretation Comments

 

             WBC (test code = WBC) 9.3          3.7-10.4                  



[facility] CenterKqhbgslOZXJRLBXZU8934-06-98 10:50:01





             Test Item    Value        Reference Range Interpretation Comments

 

             Hgb (test code = Hgb) 14.7         14.0-18.0                 



[facility] CenterWrnqzfzBDXVOAGZMF0908-48-36 10:50:01





             Test Item    Value        Reference Range Interpretation Comments

 

             RBC (test code = RBC) 5.36         4.70-6.10                 



[facility] CenterIkdiushJMWONWVNUH9371-35-35 10:50:01





             Test Item    Value        Reference Range Interpretation Comments

 

             Eosinophils # (test code 0.2          See_Comment                [A

utomated message] The



             = Eosinophils #)                                        system whic

h generated



                                                                 this result tra

nsmitted



                                                                 reference range

: <=0.5.



                                                                 The reference r

zhen was



                                                                 not used to int

erpret



                                                                 this result as



                                                                 normal/abnormal

.



[facility] CenterSgvoabkLUTXAEYPFV8423-67-73 10:50:01





             Test Item    Value        Reference Range Interpretation Comments

 

             Basophils # (test code 0.1          See_Comment                [Aut

omated message] The



             = Basophils #)                                        system which 

generated



                                                                 this result tra

nsmitted



                                                                 reference range

: <=0.2.



                                                                 The reference r

zhen was



                                                                 not used to int

erpret



                                                                 this result as



                                                                 normal/abnormal

.



[facility] CenterQtblrvgZLFRFZNTDL1072-68-56 10:50:01





             Test Item    Value        Reference Range Interpretation Comments

 

             Lymphocytes # (test code = Lymphocytes 1.8          1.0-5.5        

           



             #)                                                  



[facility] CenterBohdufqUHXCPLWCPK8809-01-78 10:50:01





             Test Item    Value        Reference Range Interpretation Comments

 

             Monocytes # (test code 0.9          See_Comment                [Aut

omated message] The



             = Monocytes #)                                        system which 

generated



                                                                 this result tra

nsmitted



                                                                 reference range

: <=0.8.



                                                                 The reference r

zhen was



                                                                 not used to int

erpret



                                                                 this result as



                                                                 normal/abnormal

.



[facility] CenterFifcihlPWWAKMKKKH0969-11-29 10:50:01





             Test Item    Value        Reference Range Interpretation Comments

 

             Neutrophils # (test code = Neutrophils 6.3          1.5-8.1        

           



             #)                                                  



[facility] CenterPyhtkmvLAXFGLTUPW1549-61-70 10:50:01





             Test Item    Value        Reference Range Interpretation Comments

 

             Eosinophils (test code = 2.0          See_Comment                [A

utomated message] The



             Eosinophils)                                        system which ge

nerated



                                                                 this result tra

nsmitted



                                                                 reference range

: <=4.0.



                                                                 The reference r

zhen was



                                                                 not used to int

erpret



                                                                 this result as



                                                                 normal/abnormal

.



[facility] CenterThmhrveLBLXOHSDYP8767-97-38 10:50:01





             Test Item    Value        Reference Range Interpretation Comments

 

             Segs (test code = Segs) 67.4         45.0-75.0                 



[facility] CenterKqelzxkEHWPXQYBQN4821-59-38 10:50:01





             Test Item    Value        Reference Range Interpretation Comments

 

             Lymphocytes (test code = Lymphocytes) 19.9         20.0-40.0       

          



[facility] CenterZdjoayqWYVOYLIPSB2488-01-08 10:50:01





             Test Item    Value        Reference Range Interpretation Comments

 

             Basophils (test code = 1.0          See_Comment                [Aut

omated message] The



             Basophils)                                          system which ge

nerated



                                                                 this result tra

nsmitted



                                                                 reference range

: <=1.0.



                                                                 The reference r

zhen was



                                                                 not used to int

erpret



                                                                 this result as



                                                                 normal/abnormal

.



[facility] CenterFgguscmIWNCBQLJLH7275-23-62 10:50:01





             Test Item    Value        Reference Range Interpretation Comments

 

             Monocytes (test code = Monocytes) 9.7          2.0-12.0            

      



[facility]2018-11-08 10:50:01





             Test Item    Value        Reference Range Interpretation Comments

 

             eGFR (test code = eGFR) 133                                    



[facility]2018-11-08 10:50:01





             Test Item    Value        Reference Range Interpretation Comments

 

             Calcium Lvl (test code = Calcium Lvl) 9.4          8.5-10.5        

          



[facility]2018-11-08 10:50:01





             Test Item    Value        Reference Range Interpretation Comments

 

             CO2 (test code = CO2) 28           24-32                     



[facility]2018-11-08 10:50:01





             Test Item    Value        Reference Range Interpretation Comments

 

             BUN (test code = BUN) 14           7-22                      



[facility]2018-11-08 10:50:01





             Test Item    Value        Reference Range Interpretation Comments

 

             Glucose Lvl (test code = Glucose Lvl) 89           70-99           

          



[facility]2018-11-08 10:50:01





             Test Item    Value        Reference Range Interpretation Comments

 

             Chloride Lvl (test code = Chloride Lvl) 104                  

            



[facility]2018-11-08 10:50:01





             Test Item    Value        Reference Range Interpretation Comments

 

             Potassium Lvl (test code = Potassium 4.2          3.5-5.1          

         



             Lvl)                                                



[facility]2018-11-08 10:50:01





             Test Item    Value        Reference Range Interpretation Comments

 

             Sodium Lvl (test code = Sodium Lvl) 137          135-145           

        



[facility]2018-11-08 10:50:01





             Test Item    Value        Reference Range Interpretation Comments

 

             Creatinine Lvl (test code = Creatinine 0.85         0.50-1.40      

           



             Lvl)                                                



[facility]2018-11-08 10:50:01





             Test Item    Value        Reference Range Interpretation Comments

 

             AGAP (test code = AGAP) 9.2          10.0-20.0                 



[facility] CenterJtxlcgxGGPOBOZMCC5234-98-60 10:50:01





             Test Item    Value        Reference Range Interpretation Comments

 

             ACT (TEG) Rapid (test code = ACT (TEG) 136 s                 

           



             Rapid)                                              



[facility] CenterRlltgbiXDVGQTMKRL3578-70-85 10:50:01





             Test Item    Value        Reference Range Interpretation Comments

 

             Split Point Rapid (test code = Split 0.6 min                       

         



             Point Rapid)                                        



[facility] CenterZjhaekoJSIDFJXTUR4681-00-23 10:50:01





             Test Item    Value        Reference Range Interpretation Comments

 

             R-time Rapid (test code = R-time 0.9 min      0.4-0.7              

     



             Rapid)                                              



[facility] CenterAxqxigcTWIOGYNCTY6107-21-38 10:50:01





             Test Item    Value        Reference Range Interpretation Comments

 

             K-time Rapid (test code = K-time 1.4 min      0.6-2.3              

     



             Rapid)                                              



[facility] CenterAyqfpcePKTBZXKFNR1691-08-82 10:50:01





             Test Item    Value        Reference Range Interpretation Comments

 

             Angle Rapid (test code = Angle 71 degrees   64-80                  

   



             Rapid)                                              



[facility] CenterUxcrzojKJFGDZSMJI5814-60-28 10:50:01





             Test Item    Value        Reference Range Interpretation Comments

 

             G-value Rapid (test code = G-value 12.7         5.0-11.6           

       



             Rapid)                                              



[facility] CenterRerqhkeAZPHAHRRSP9940-18-09 10:50:01





             Test Item    Value        Reference Range Interpretation Comments

 

             Max Amplitude Rapid (test code = Max 72 mm        52-71            

         



             Amplitude Rapid)                                        



[facility] CenterQwvpbhsTXLALBWCJL7716-14-44 10:50:01





             Test Item    Value        Reference Range Interpretation Comments

 

             Estimated % Lysis Rapid 0.1          See_Comment                [Au

tomated message] The



             (test code = Estimated                                        syste

m which generated



             % Lysis Rapid)                                        this result t

ransmitted



                                                                 reference range

: <=7.5.



                                                                 The reference r

zhen was



                                                                 not used to int

erpret



                                                                 this result as



                                                                 normal/abnormal

.



[facility] CenterEklaldzVUYNKLTLZW0092-33-63 10:50:01





             Test Item    Value        Reference Range Interpretation Comments

 

             Platelet (test code = Platelet) 367          [id number]               

    



[facility] CenterHcorxhvEAZRONODFY6437-77-69 10:50:01





             Test Item    Value        Reference Range Interpretation Comments

 

             MPV (test code = MPV) 7.8          7.4-10.4                  



[facility] CenterErotudnBGOSOHKJSS3948-59-51 10:50:01





             Test Item    Value        Reference Range Interpretation Comments

 

             MCH (test code = MCH) 27.4 pg      27.0-31.0                 



[facility] CenterPcjwkuoJYWDXVNCBY9554-10-65 10:50:01





             Test Item    Value        Reference Range Interpretation Comments

 

             MCV (test code = MCV) 80.7         80.0-94.0                 



[facility] CenterIqxyxabGFBHYTOTRB3669-37-99 10:50:01





             Test Item    Value        Reference Range Interpretation Comments

 

             MCHC (test code = MCHC) 34.0         32.0-36.0                 



[facility] CenterUiivmowRBFCPLZTDJ4329-16-53 10:50:01





             Test Item    Value        Reference Range Interpretation Comments

 

             RDW (test code = RDW) 18.9         11.5-14.5                 



[facility] CenterWgefcbmHCBLWQBSLL2080-64-61 10:50:01





             Test Item    Value        Reference Range Interpretation Comments

 

             Hct (test code = Hct) 43.3         42.0-54.0                 



[facility] CenterAwlqzajMSIBCKQYHO4457-87-98 10:50:01





             Test Item    Value        Reference Range Interpretation Comments

 

             WBC (test code = WBC) 9.3          3.7-10.4                  



[facility] CenterTpfvpwrGSAGNPMCVO0302-70-71 10:50:01





             Test Item    Value        Reference Range Interpretation Comments

 

             Hgb (test code = Hgb) 14.7         14.0-18.0                 



[facility] CenterPslpelqDLBNLRDZFT1504-46-41 10:50:01





             Test Item    Value        Reference Range Interpretation Comments

 

             RBC (test code = RBC) 5.36         4.70-6.10                 



[facility] CenterUowhgwjFFSKRSVUGN2570-79-87 10:50:01





             Test Item    Value        Reference Range Interpretation Comments

 

             Eosinophils # (test code 0.2          See_Comment                [A

utomated message] The



             = Eosinophils #)                                        system whic

h generated



                                                                 this result tra

nsmitted



                                                                 reference range

: <=0.5.



                                                                 The reference r

zhen was



                                                                 not used to int

erpret



                                                                 this result as



                                                                 normal/abnormal

.



[facility] CenterGrgcrlsUXFGGLCAGE5301-49-18 10:50:01





             Test Item    Value        Reference Range Interpretation Comments

 

             Basophils # (test code 0.1          See_Comment                [Aut

omated message] The



             = Basophils #)                                        system which 

generated



                                                                 this result tra

nsmitted



                                                                 reference range

: <=0.2.



                                                                 The reference r

zhen was



                                                                 not used to int

erpret



                                                                 this result as



                                                                 normal/abnormal

.



[facility] CenterXuvuslnJZZGAYWRDA3393-53-49 10:50:01





             Test Item    Value        Reference Range Interpretation Comments

 

             Lymphocytes # (test code = Lymphocytes 1.8          1.0-5.5        

           



             #)                                                  



[facility] CenterKclxynqGIVISMIQDA4926-97-07 10:50:01





             Test Item    Value        Reference Range Interpretation Comments

 

             Monocytes # (test code 0.9          See_Comment                [Aut

omated message] The



             = Monocytes #)                                        system which 

generated



                                                                 this result tra

nsmitted



                                                                 reference range

: <=0.8.



                                                                 The reference r

zhen was



                                                                 not used to int

erpret



                                                                 this result as



                                                                 normal/abnormal

.



[facility] CenterOajvqkbMBGODPBJDO1234-19-69 10:50:01





             Test Item    Value        Reference Range Interpretation Comments

 

             Neutrophils # (test code = Neutrophils 6.3          1.5-8.1        

           



             #)                                                  



[facility] CenterAfziajiFLYOAFNBNR8197-73-25 10:50:01





             Test Item    Value        Reference Range Interpretation Comments

 

             Eosinophils (test code = 2.0          See_Comment                [A

utomated message] The



             Eosinophils)                                        system which ge

nerated



                                                                 this result tra

nsmitted



                                                                 reference range

: <=4.0.



                                                                 The reference r

zhen was



                                                                 not used to int

erpret



                                                                 this result as



                                                                 normal/abnormal

.



[facility] CenterFynkynhQORWZHZRYB6499-10-00 10:50:01





             Test Item    Value        Reference Range Interpretation Comments

 

             Segs (test code = Segs) 67.4         45.0-75.0                 



[facility] CenterOyarastGUUCZGSIMB7207-92-67 10:50:01





             Test Item    Value        Reference Range Interpretation Comments

 

             Lymphocytes (test code = Lymphocytes) 19.9         20.0-40.0       

          



[facility] CenterBayjadkQQVRFRQIYJ7268-04-97 10:50:01





             Test Item    Value        Reference Range Interpretation Comments

 

             Basophils (test code = 1.0          See_Comment                [Aut

omated message] The



             Basophils)                                          system which ge

nerated



                                                                 this result tra

nsmitted



                                                                 reference range

: <=1.0.



                                                                 The reference r

zhen was



                                                                 not used to int

erpret



                                                                 this result as



                                                                 normal/abnormal

.



[facility] CenterLnyzhxsLLSNGAJGLL6103-66-65 10:50:01





             Test Item    Value        Reference Range Interpretation Comments

 

             Monocytes (test code = Monocytes) 9.7          2.0-12.0            

      



[facility]2018-05-08 05:42:00





             Test Item    Value        Reference Range Interpretation Comments

 

             B/C Ratio (test code = B/C Ratio) 17 1         6-25                

      



[place] 445548-05-08 05:42:00





             Test Item    Value        Reference Range Interpretation Comments

 

             Globulin (test code = Globulin) 4.3          2.7-4.2               

    



[place] 445548-05-08 05:42:00





             Test Item    Value        Reference Range Interpretation Comments

 

             A/G Ratio (test code = A/G Ratio) 0.7 1        0.7-1.6             

      



[place] [address]-05-08 05:42:00





             Test Item    Value        Reference Range Interpretation Comments

 

             AGAP (test code = AGAP) 14.4         10.0-20.0                 



[place] 445548-05-08 05:42:00





             Test Item    Value        Reference Range Interpretation Comments

 

             eGFR (test code = eGFR) 113                                    



[place] 445548-05-08 05:42:00





             Test Item    Value        Reference Range Interpretation Comments

 

             Alk Phos (test code = Alk Phos) 76                           

    



[facility]2018-05-08 05:42:00





             Test Item    Value        Reference Range Interpretation Comments

 

             ALT (test code = ALT) 35           See_Comment                [Auto

mated message] The



                                                                 system which ge

nerated this



                                                                 result transmit

huma



                                                                 reference range

: <=65. The



                                                                 reference range

 was not



                                                                 used to interpr

et this



                                                                 result as zayda

l/abnormal.



[place] 445548-05-08 05:42:00





             Test Item    Value        Reference Range Interpretation Comments

 

             Albumin Lvl (test code = Albumin Lvl) 2.8          3.5-5.0         

          



[facility]2018-05-08 05:42:00





             Test Item    Value        Reference Range Interpretation Comments

 

             Total Protein (test code = Total 7.1          6.4-8.4              

     



             Protein)                                            



[place] 445548-05-08 05:42:00





             Test Item    Value        Reference Range Interpretation Comments

 

             Calcium Lvl (test code = Calcium Lvl) 8.7          8.5-10.5        

          



[place] 445548-05-08 05:42:00





             Test Item    Value        Reference Range Interpretation Comments

 

             AST (test code = AST) 18           See_Comment                [Auto

mated message] The



                                                                 system which ge

nerated this



                                                                 result transmit

huma



                                                                 reference range

: <=37. The



                                                                 reference range

 was not



                                                                 used to interpr

et this



                                                                 result as zayda

l/abnormal.



[facility]2018-05-08 05:42:00





             Test Item    Value        Reference Range Interpretation Comments

 

             Bili Total (test code = Bili Total) 0.3          0.2-1.3           

        



[facility]2018-05-08 05:42:00





             Test Item    Value        Reference Range Interpretation Comments

 

             Potassium Lvl (test code = Potassium 4.4          3.5-5.1          

         



             Lvl)                                                



[facility]2018-05-08 05:42:00





             Test Item    Value        Reference Range Interpretation Comments

 

             Chloride Lvl (test code = Chloride Lvl) 109                  

            



[facility]2018-05-08 05:42:00





             Test Item    Value        Reference Range Interpretation Comments

 

             CO2 (test code = CO2) 23           24-32                     



[facility]2018-05-08 05:42:00





             Test Item    Value        Reference Range Interpretation Comments

 

             Glucose Lvl (test code = Glucose Lvl) 114          70-99           

          



[facility]2018-05-08 05:42:00





             Test Item    Value        Reference Range Interpretation Comments

 

             Creatinine Lvl (test code = Creatinine 1.01         0.50-1.40      

           



             Lvl)                                                



[facility]2018-05-08 05:42:00





             Test Item    Value        Reference Range Interpretation Comments

 

             BUN (test code = BUN) 17           7-22                      



[facility]2018-05-08 05:42:00





             Test Item    Value        Reference Range Interpretation Comments

 

             Sodium Lvl (test code = Sodium Lvl) 142          135-145           

        



[facility] CenterMnyjuzlZKENILKVSW4125-04-90 05:42:00





             Test Item    Value        Reference Range Interpretation Comments

 

             Basophils (test code = 0.6          See_Comment                [Aut

omated message] The



             Basophils)                                          system which ge

nerated



                                                                 this result tra

nsmitted



                                                                 reference range

: <=1.0.



                                                                 The reference r

zhen was



                                                                 not used to int

erpret



                                                                 this result as



                                                                 normal/abnormal

.



[facility] CenterLscgwqdSOMHCSWWPL9583-80-39 05:42:00





             Test Item    Value        Reference Range Interpretation Comments

 

             Segs-Bands # (test code = Segs-Bands #) 4.8          1.5-8.1       

            



[facility] CenterRxxskvnPBTFPYWNGA7171-83-73 05:42:00





             Test Item    Value        Reference Range Interpretation Comments

 

             Monocytes # (test code 0.7          See_Comment                [Aut

omated message] The



             = Monocytes #)                                        system which 

generated



                                                                 this result tra

nsmitted



                                                                 reference range

: <=0.8.



                                                                 The reference r

zhen was



                                                                 not used to int

erpret



                                                                 this result as



                                                                 normal/abnormal

.



[facility] CenterEoxiykrGHBWKXUJHA9854-12-15 05:42:00





             Test Item    Value        Reference Range Interpretation Comments

 

             Lymphocytes # (test code = Lymphocytes 1.9          1.0-5.5        

           



             #)                                                  



[facility] CenterIkynzshFAWYERUDNP4664-10-99 05:42:00





             Test Item    Value        Reference Range Interpretation Comments

 

             Monocytes (test code = Monocytes) 9.4          2.0-12.0            

      



[facility] CenterIsavxtjIOIFMTKICZ9355-71-46 05:42:00





             Test Item    Value        Reference Range Interpretation Comments

 

             Eosinophils # (test code 0.2          See_Comment                [A

utomated message] The



             = Eosinophils #)                                        system whic

h generated



                                                                 this result tra

nsmitted



                                                                 reference range

: <=0.5.



                                                                 The reference r

zhen was



                                                                 not used to int

erpret



                                                                 this result as



                                                                 normal/abnormal

.



[facility] CenterRwyhqtlJPCKEAIVUB9415-97-44 05:42:00





             Test Item    Value        Reference Range Interpretation Comments

 

             Eosinophils (test code = 2.9          See_Comment                [A

utomated message] The



             Eosinophils)                                        system which ge

nerated



                                                                 this result tra

nsmitted



                                                                 reference range

: <=4.0.



                                                                 The reference r

zhen was



                                                                 not used to int

erpret



                                                                 this result as



                                                                 normal/abnormal

.



[facility] CenterUmbyirfSKICKRKOJT1951-96-49 05:42:00





             Test Item    Value        Reference Range Interpretation Comments

 

             Segs (test code = Segs) 62.2         45.0-75.0                 



[facility] CenterPzvmpwcJJQFNRVLYK1486-38-82 05:42:00





             Test Item    Value        Reference Range Interpretation Comments

 

             Lymphocytes (test code = Lymphocytes) 24.9         20.0-40.0       

          



[facility] CenterMeewgqiUJTGVUKIUM9129-73-53 05:42:00





             Test Item    Value        Reference Range Interpretation Comments

 

             MCH (test code = MCH) 27.5 pg      27.0-31.0                 



[facility] CenterXzsqbywUSFFRFQGQC9194-89-21 05:42:00





             Test Item    Value        Reference Range Interpretation Comments

 

             MCV (test code = MCV) 85.3         80.0-94.0                 



[facility] CenterPyczhubOQACIOAQLA6485-26-09 05:42:00





             Test Item    Value        Reference Range Interpretation Comments

 

             Hct (test code = Hct) 43.9         42.0-54.0                 



[facility] CenterQpeghzdFOKCXJAERT9047-72-29 05:42:00





             Test Item    Value        Reference Range Interpretation Comments

 

             Hgb (test code = Hgb) 14.2         14.0-18.0                 



[facility] CenterZlfzflrQEVFLRMNSQ8362-86-20 05:42:00





             Test Item    Value        Reference Range Interpretation Comments

 

             WBC (test code = WBC) 7.7          3.7-10.4                  



[facility] CenterYomyhgkBHQFRCAPLS6850-45-90 05:42:00





             Test Item    Value        Reference Range Interpretation Comments

 

             RBC (test code = RBC) 5.15         4.70-6.10                 



[facility] CenterIhvhjquLFKNWWSQMY2077-75-85 05:42:00





             Test Item    Value        Reference Range Interpretation Comments

 

             MPV (test code = MPV) 8.4          7.4-10.4                  



[facility] CenterLzyautoCRINIDPYFH7821-34-53 05:42:00





             Test Item    Value        Reference Range Interpretation Comments

 

             MCHC (test code = MCHC) 32.3         32.0-36.0                 



[facility] CenterCbnamubAZCOJNOFBL8096-71-07 05:42:00





             Test Item    Value        Reference Range Interpretation Comments

 

             RDW (test code = RDW) 17.3         11.5-14.5                 



[facility] CenterVzhrltqVOPGQUVNQV8989-91-51 05:42:00





             Test Item    Value        Reference Range Interpretation Comments

 

             Platelet (test code = Platelet) 317          133-450               

    



[facility]2018-05-08 05:42:00





             Test Item    Value        Reference Range Interpretation Comments

 

             B/C Ratio (test code = B/C Ratio) 17 1         6-25                

      



[facility]2018-05-08 05:42:00





             Test Item    Value        Reference Range Interpretation Comments

 

             Globulin (test code = Globulin) 4.3          2.7-4.2               

    



[facility]2018-05-08 05:42:00





             Test Item    Value        Reference Range Interpretation Comments

 

             A/G Ratio (test code = A/G Ratio) 0.7 1        0.7-1.6             

      



[facility]2018-05-08 05:42:00





             Test Item    Value        Reference Range Interpretation Comments

 

             AGAP (test code = AGAP) 14.4         10.0-20.0                 



[facility] [facility]2018-[facility] 05:42:00





             Test Item    Value        Reference Range Interpretation Comments

 

             eGFR (test code = eGFR) 113                                    



[facility]2018-05-08 05:42:00





             Test Item    Value        Reference Range Interpretation Comments

 

             Alk Phos (test code = Alk Phos) 76                           

    



[facility]2018-05-08 05:42:00





             Test Item    Value        Reference Range Interpretation Comments

 

             ALT (test code = ALT) 35           See_Comment                [Auto

mated message] The



                                                                 system which ge

nerated this



                                                                 result transmit

huma



                                                                 reference range

: <=65. The



                                                                 reference range

 was not



                                                                 used to interpr

et this



                                                                 result as zayda

l/abnormal.



[facility]2018-05-08 05:42:00





             Test Item    Value        Reference Range Interpretation Comments

 

             Albumin Lvl (test code = Albumin Lvl) 2.8          3.5-5.0         

          



[facility]2018-05-08 05:42:00





             Test Item    Value        Reference Range Interpretation Comments

 

             Total Protein (test code = Total 7.1          6.4-8.4              

     



             Protein)                                            



[facility]2018-05-08 05:42:00





             Test Item    Value        Reference Range Interpretation Comments

 

             Calcium Lvl (test code = Calcium Lvl) 8.7          8.5-10.5        

          



[facility]2018-05-08 05:42:00





             Test Item    Value        Reference Range Interpretation Comments

 

             AST (test code = AST) 18           See_Comment                [Auto

mated message] The



                                                                 system which ge

nerated this



                                                                 result transmit

huma



                                                                 reference range

: <=37. The



                                                                 reference range

 was not



                                                                 used to interpr

et this



                                                                 result as zayda

l/abnormal.



[facility]2018-05-08 05:42:00





             Test Item    Value        Reference Range Interpretation Comments

 

             Bili Total (test code = Bili Total) 0.3          0.2-1.3           

        



[facility]2018-05-08 05:42:00





             Test Item    Value        Reference Range Interpretation Comments

 

             Potassium Lvl (test code = Potassium 4.4          3.5-5.1          

         



             Lvl)                                                



[facility]2018-05-08 05:42:00





             Test Item    Value        Reference Range Interpretation Comments

 

             Chloride Lvl (test code = Chloride Lvl) 109                  

            



[facility]2018-05-08 05:42:00





             Test Item    Value        Reference Range Interpretation Comments

 

             CO2 (test code = CO2) 23           24-32                     



[facility]2018-05-08 05:42:00





             Test Item    Value        Reference Range Interpretation Comments

 

             Glucose Lvl (test code = Glucose Lvl) 114          70-99           

          



[facility]2018-05-08 05:42:00





             Test Item    Value        Reference Range Interpretation Comments

 

             Creatinine Lvl (test code = Creatinine 1.01         0.50-1.40      

           



             Lvl)                                                



[facility]2018-05-08 05:42:00





             Test Item    Value        Reference Range Interpretation Comments

 

             BUN (test code = BUN) 17           7-22                      



[facility]2018-05-08 05:42:00





             Test Item    Value        Reference Range Interpretation Comments

 

             Sodium Lvl (test code = Sodium Lvl) 142          135-145           

        



[id number] 05:42:00





             Test Item    Value        Reference Range Interpretation Comments

 

             Basophils (test code = 0.6          See_Comment                [Aut

omated message] The



             Basophils)                                          system which ge

nerated



                                                                 this result tra

nsmitted



                                                                 reference range

: <=1.0.



                                                                 The reference r

zhen was



                                                                 not used to int

erpret



                                                                 this result as



                                                                 normal/abnormal

.



[facility] CenterRmaqoifCLSCIODXKU3812-13-10 05:42:00





             Test Item    Value        Reference Range Interpretation Comments

 

             Segs-Bands # (test code = Segs-Bands #) 4.8          1.5-8.1       

            



[facility] CenterL[id number] 05:42:00





             Test Item    Value        Reference Range Interpretation Comments

 

             Monocytes # (test code 0.7          See_Comment                [Aut

omated message] The



             = Monocytes #)                                        system which 

generated



                                                                 this result tra

nsmitted



                                                                 reference range

: <=0.8.



                                                                 The reference r

zhen was



                                                                 not used to int

erpret



                                                                 this result as



                                                                 normal/abnormal

.



[facility] CenterSahsevmHEAZGVKSFA4409-98-89 05:42:00





             Test Item    Value        Reference Range Interpretation Comments

 

             Lymphocytes # (test code = Lymphocytes 1.9          1.0-5.5        

           



             #)                                                  



[facility] CenterZbxotpuKBRRXJWSHQ7394-81-42 05:42:00





             Test Item    Value        Reference Range Interpretation Comments

 

             Monocytes (test code = Monocytes) 9.4          2.0-12.0            

      



[facility] CenterBjqciyoDYBXKKXWQI6142-76-91 05:42:00





             Test Item    Value        Reference Range Interpretation Comments

 

             Eosinophils # (test code 0.2          See_Comment                [A

utomated message] The



             = Eosinophils #)                                        system whic

h generated



                                                                 this result tra

nsmitted



                                                                 reference range

: <=0.5.



                                                                 The reference r

zhen was



                                                                 not used to int

erpret



                                                                 this result as



                                                                 normal/abnormal

.



[facility] CenterVewoxqwPAUUKQDWYI7614-09-57 05:42:00





             Test Item    Value        Reference Range Interpretation Comments

 

             Eosinophils (test code = 2.9          See_Comment                [A

utomated message] The



             Eosinophils)                                        system which ge

nerated



                                                                 this result tra

nsmitted



                                                                 reference range

: <=4.0.



                                                                 The reference r

zhen was



                                                                 not used to int

erpret



                                                                 this result as



                                                                 normal/abnormal

.



[facility] CenterZawnkmbSLLMOZYOKC2947-28-40 05:42:00





             Test Item    Value        Reference Range Interpretation Comments

 

             Segs (test code = Segs) 62.2         45.0-75.0                 



[facility] CenterFpxnixdVLDUGZTMZR9749-06-39 05:42:00





             Test Item    Value        Reference Range Interpretation Comments

 

             Lymphocytes (test code = Lymphocytes) 24.9         20.0-40.0       

          



[facility] CenterCbmdtloWGUQIQNXFT9980-26-44 05:42:00





             Test Item    Value        Reference Range Interpretation Comments

 

             MCH (test code = MCH) 27.5 pg      27.0-31.0                 



[facility] CenterZqhtpqfHEPHSFIPPM8886-24-17 05:42:00





             Test Item    Value        Reference Range Interpretation Comments

 

             MCV (test code = MCV) 85.3         80.0-94.0                 



[facility] CenterNvssqiqQBUZJYKJAZ5294-23-75 05:42:00





             Test Item    Value        Reference Range Interpretation Comments

 

             Hct (test code = Hct) 43.9         42.0-54.0                 



[facility] CenterEoeexcuLZXJAPQSNU6731-90-35 05:42:00





             Test Item    Value        Reference Range Interpretation Comments

 

             Hgb (test code = Hgb) 14.2         14.0-18.0                 



[facility] CenterWcixtbpJWMUKSPEJY2177-60-69 05:42:00





             Test Item    Value        Reference Range Interpretation Comments

 

             WBC (test code = WBC) 7.7          3.7-10.4                  



[facility] CenterSckbyzzRCPXHAUVGB0162-35-36 05:42:00





             Test Item    Value        Reference Range Interpretation Comments

 

             RBC (test code = RBC) 5.15         4.70-6.10                 



[facility] CenterHnivhxaSASQFJGBMF1116-83-86 05:42:00





             Test Item    Value        Reference Range Interpretation Comments

 

             MPV (test code = MPV) 8.4          7.4-10.4                  



[facility] CenterUxntvesPMZZZWFRDK8653-88-11 05:42:00





             Test Item    Value        Reference Range Interpretation Comments

 

             MCHC (test code = MCHC) 32.3         32.0-36.0                 



[facility] CenterFsnkussTKVIQQLNIH6184-60-80 05:42:00





             Test Item    Value        Reference Range Interpretation Comments

 

             RDW (test code = RDW) 17.3         11.5-14.5                 



[facility]GWGTEANDB[facility]12[facility]





             Test Item    Value        Reference Range Interpretation Comments

 

             Platelet (test code = Platelet) 317          133-450               

    



[facility]2018-05-08 05:42:00





             Test Item    Value        Reference Range Interpretation Comments

 

             B/C Ratio (test code = B/C Ratio) 17 1         6-25                

      



[facility]2018-05-08 05:42:00





             Test Item    Value        Reference Range Interpretation Comments

 

             Globulin (test code = Globulin) 4.3          2.7-4.2               

    



[facility]2018-05-08 05:42:00





             Test Item    Value        Reference Range Interpretation Comments

 

             A/G Ratio (test code = A/G Ratio) 0.7 1        0.7-1.6             

      



[facility]2018-05-08 05:42:00





             Test Item    Value        Reference Range Interpretation Comments

 

             AGAP (test code = AGAP) 14.4         10.0-20.0                 



[facility]2018-05-08 05:42:00





             Test Item    Value        Reference Range Interpretation Comments

 

             eGFR (test code = eGFR) 113                                    



[facility]2018-05-08 05:42:00





             Test Item    Value        Reference Range Interpretation Comments

 

             Alk Phos (test code = Alk Phos) 76                           

    



[facility]2018-05-08 05:42:00





             Test Item    Value        Reference Range Interpretation Comments

 

             ALT (test code = ALT) 35           See_Comment                [Auto

mated message] The



                                                                 system which ge

nerated this



                                                                 result transmit

huma



                                                                 reference range

: <=65. The



                                                                 reference range

 was not



                                                                 used to interpr

et this



                                                                 result as zayda

l/abnormal.



[facility]2018-05-08 05:42:00





             Test Item    Value        Reference Range Interpretation Comments

 

             Albumin Lvl (test code = Albumin Lvl) 2.8          3.5-5.0         

          



[facility]2018-05-08 05:42:00





             Test Item    Value        Reference Range Interpretation Comments

 

             Total Protein (test code = Total 7.1          6.4-8.4              

     



             Protein)                                            



[facility]2018-05-08 05:42:00





             Test Item    Value        Reference Range Interpretation Comments

 

             Calcium Lvl (test code = Calcium Lvl) 8.7          8.5-10.5        

          



[facility]2018-05-08 05:42:00





             Test Item    Value        Reference Range Interpretation Comments

 

             AST (test code = AST) 18           See_Comment                [Auto

mated message] The



                                                                 system which ge

nerated this



                                                                 result transmit

huma



                                                                 reference range

: <=37. The



                                                                 reference range

 was not



                                                                 used to interpr

et this



                                                                 result as zayda

l/abnormal.



[facility]2018-05-08 05:42:00





             Test Item    Value        Reference Range Interpretation Comments

 

             Bili Total (test code = Bili Total) 0.3          0.2-1.3           

        



[facility]2018-05-08 05:42:00





             Test Item    Value        Reference Range Interpretation Comments

 

             Potassium Lvl (test code = Potassium 4.4          3.5-5.1          

         



             Lvl)                                                



[facility]2018-05-08 05:42:00





             Test Item    Value        Reference Range Interpretation Comments

 

             Chloride Lvl (test code = Chloride Lvl) 109                  

            



[facility]2018-05-08 05:42:00





             Test Item    Value        Reference Range Interpretation Comments

 

             CO2 (test code = CO2) 23           24-32                     



[facility]2018-05-08 05:42:00





             Test Item    Value        Reference Range Interpretation Comments

 

             Glucose Lvl (test code = Glucose Lvl) 114          70-99           

          



[facility]2018-05-08 05:42:00





             Test Item    Value        Reference Range Interpretation Comments

 

             Creatinine Lvl (test code = Creatinine 1.01         0.50-1.40      

           



             Lvl)                                                



[facility]2018-05-08 05:42:00





             Test Item    Value        Reference Range Interpretation Comments

 

             BUN (test code = BUN) 17           7-22                      



[facility]2018-05-08 05:42:00





             Test Item    Value        Reference Range Interpretation Comments

 

             Sodium Lvl (test code = Sodium Lvl) 142          135-145           

        



[facility] CenterAhfjeimVFZEZBIRCC1216-87-77 05:42:00





             Test Item    Value        Reference Range Interpretation Comments

 

             Basophils (test code = 0.6          See_Comment                [Aut

omated message] The



             Basophils)                                          system which ge

nerated



                                                                 this result tra

nsmitted



                                                                 reference range

: <=1.0.



                                                                 The reference r

zhen was



                                                                 not used to int

erpret



                                                                 this result as



                                                                 normal/abnormal

.



[facility] [id number] 05:42:00





             Test Item    Value        Reference Range Interpretation Comments

 

             Segs-Bands # (test code = Segs-Bands #) 4.8          1.5-8.1       

            



[facility] CenterOggrpfnAICQMFZJON4859-44-18 05:42:00





             Test Item    Value        Reference Range Interpretation Comments

 

             Monocytes # (test code 0.7          See_Comment                [Aut

omated message] The



             = Monocytes #)                                        system which 

generated



                                                                 this result tra

nsmitted



                                                                 reference range

: <=0.8.



                                                                 The reference r

zhen was



                                                                 not used to int

erpret



                                                                 this result as



                                                                 normal/abnormal

.



[facility] CenterQcvjnvjXOFUEEAQDP8597-95-71 05:42:00





             Test Item    Value        Reference Range Interpretation Comments

 

             Lymphocytes # (test code = Lymphocytes 1.9          1.0-5.5        

           



             #)                                                  



[facility] CenterOtovzuhQXBLCRRJTE5264-26-43 05:42:00





             Test Item    Value        Reference Range Interpretation Comments

 

             Monocytes (test code = Monocytes) 9.4          2.0-12.0            

      



[facility] CenterHuarrbvERZNORTIQR1959-77-38 05:42:00





             Test Item    Value        Reference Range Interpretation Comments

 

             Eosinophils # (test code 0.2          See_Comment                [A

utomated message] The



             = Eosinophils #)                                        system whic

h generated



                                                                 this result tra

nsmitted



                                                                 reference range

: <=0.5.



                                                                 The reference r

zhen was



                                                                 not used to int

erpret



                                                                 this result as



                                                                 normal/abnormal

.



[facility] CenterMycctmqXNKAXUTNHJ9199-84-17 05:42:00





             Test Item    Value        Reference Range Interpretation Comments

 

             Eosinophils (test code = 2.9          See_Comment                [A

utomated message] The



             Eosinophils)                                        system which ge

nerated



                                                                 this result tra

nsmitted



                                                                 reference range

: <=4.0.



                                                                 The reference r

zhen was



                                                                 not used to int

erpret



                                                                 this result as



                                                                 normal/abnormal

.



[facility] CenterAiqnvaqVWOAVBFQAA6845-81-73 05:42:00





             Test Item    Value        Reference Range Interpretation Comments

 

             Segs (test code = Segs) 62.2         45.0-75.0                 



[facility] CenterAowsvnuATHNJQRURG2419-93-62 05:42:00





             Test Item    Value        Reference Range Interpretation Comments

 

             Lymphocytes (test code = Lymphocytes) 24.9         20.0-40.0       

          



[facility] CenterPytzuozEBJXDXLGPJ8021-85-97 05:42:00





             Test Item    Value        Reference Range Interpretation Comments

 

             MCH (test code = MCH) 27.5 pg      27.0-31.0                 



[facility] CenterQmyqsqbSKCEJOBKWM8096-36-51 05:42:00





             Test Item    Value        Reference Range Interpretation Comments

 

             MCV (test code = MCV) 85.3         80.0-94.0                 



[facility] CenterEqcuidiNAQYILZHMU2826-99-89 05:42:00





             Test Item    Value        Reference Range Interpretation Comments

 

             Hct (test code = Hct) 43.9         42.0-54.0                 



[facility] CenterDmsrpbyFZQZZKOXFB8400-02-71 05:42:00





             Test Item    Value        Reference Range Interpretation Comments

 

             Hgb (test code = Hgb) 14.2         14.0-18.0                 



[facility] CenterAtvbsltHULENXZKBS2706-71-50 05:42:00





             Test Item    Value        Reference Range Interpretation Comments

 

             WBC (test code = WBC) 7.7          3.7-10.4                  



[facility] [id number] 05:42:00





             Test Item    Value        Reference Range Interpretation Comments

 

             RBC (test code = RBC) 5.15         4.70-6.10                 



[facility] CenterGtkwiqeAAPNVRBGOJ3781-97-60 05:42:00





             Test Item    Value        Reference Range Interpretation Comments

 

             MPV (test code = MPV) 8.4          7.4-10.4                  



[facility] [id number] 05:42:00





             Test Item    Value        Reference Range Interpretation Comments

 

             MCHC (test code = MCHC) 32.3         32.0-36.0                 



[facility] CenterZgnxzqaSCPJIGBHVU4763-10-68 05:42:00





             Test Item    Value        Reference Range Interpretation Comments

 

             RDW (test code = RDW) 17.3         11.5-14.5                 



[facility] CenterFbgffwfZWQSLVPFZY4229-75-29 05:42:00





             Test Item    Value        Reference Range Interpretation Comments

 

             Platelet (test code = Platelet) 317          [id number]               

    



[facility]2018-05-08 05:42:00





             Test Item    Value        Reference Range Interpretation Comments

 

             B/C Ratio (test code = B/C Ratio) 17 1         6-25                

      



[facility]2018-05-08 05:42:00





             Test Item    Value        Reference Range Interpretation Comments

 

             Globulin (test code = Globulin) 4.3          2.7-4.2               

    



[facility]2018-05-08 05:42:00





             Test Item    Value        Reference Range Interpretation Comments

 

             A/G Ratio (test code = A/G Ratio) 0.7 1        0.7-1.6             

      



[facility]2018-05-08 05:42:00





             Test Item    Value        Reference Range Interpretation Comments

 

             AGAP (test code = AGAP) 14.4         10.0-20.0                 



[facility]2018-05-08 05:42:00





             Test Item    Value        Reference Range Interpretation Comments

 

             eGFR (test code = eGFR) 113                                    



[facility] [id number] 05[facility]42:00





             Test Item    Value        Reference Range Interpretation Comments

 

             Alk Phos (test code = Alk Phos) 76                           

    



[facility]2018-05-08 05:42:00





             Test Item    Value        Reference Range Interpretation Comments

 

             ALT (test code = ALT) 35           See_Comment                [Auto

mated message] The



                                                                 system which ge

nerated this



                                                                 result transmit

huma



                                                                 reference range

: <=65. The



                                                                 reference range

 was not



                                                                 used to interpr

et this



                                                                 result as zayda

l/abnormal.



[facility] [id number] 05:42:00





             Test Item    Value        Reference Range Interpretation Comments

 

             Albumin Lvl (test code = Albumin Lvl) 2.8          3.5-5.0         

          



[facility] [id number] 05:42:00





             Test Item    Value        Reference Range Interpretation Comments

 

             Total Protein (test code = Total 7.1          6.4-8.4              

     



             Protein)                                            



[facility] [id number] 05:42:00





             Test Item    Value        Reference Range Interpretation Comments

 

             Calcium Lvl (test code = Calcium Lvl) 8.7          8.5-10.5        

          



[facility] [id number] 05:42:00





             Test Item    Value        Reference Range Interpretation Comments

 

             AST (test code = AST) 18           See_Comment                [Auto

mated message] The



                                                                 system which ge

nerated this



                                                                 result transmit

huma



                                                                 reference range

: <=37. The



                                                                 reference range

 was not



                                                                 used to interpr

et this



                                                                 result as zayda

l/abnormal.



[facility] [id number] 05:42:00





             Test Item    Value        Reference Range Interpretation Comments

 

             Bili Total (test code = Bili Total) 0.3          0.2-1.3           

        



[facility] [id number] 05:42:00





             Test Item    Value        Reference Range Interpretation Comments

 

             Potassium Lvl (test code = Potassium 4.4          3.5-5.1          

         



             Lvl)                                                



[facility] [id number] 05:42:00





             Test Item    Value        Reference Range Interpretation Comments

 

             Chloride Lvl (test code = Chloride Lvl) 109                  

            



[facility] [id number] 05:42:00





             Test Item    Value        Reference Range Interpretation Comments

 

             CO2 (test code = CO2) 23           24-32                     



[facility]2018-05-08 05:42:00





             Test Item    Value        Reference Range Interpretation Comments

 

             Glucose Lvl (test code = Glucose Lvl) 114          70-99           

          



[facility]2018-05-08 05:42:00





             Test Item    Value        Reference Range Interpretation Comments

 

             Creatinine Lvl (test code = Creatinine 1.01         0.50-1.40      

           



             Lvl)                                                



[facility]2018-05-08 05:42:00





             Test Item    Value        Reference Range Interpretation Comments

 

             BUN (test code = BUN) 17           7-22                      



[facility]2018-05-08 05:42:00





             Test Item    Value        Reference Range Interpretation Comments

 

             Sodium Lvl (test code = Sodium Lvl) 142          135-145           

        



[facility] [id number] 05:42:00





             Test Item    Value        Reference Range Interpretation Comments

 

             Basophils (test code = 0.6          See_Comment                [Aut

omated message] The



             Basophils)                                          system which ge

nerated



                                                                 this result tra

nsmitted



                                                                 reference range

: <=1.0.



                                                                 The reference r

zhen was



                                                                 not used to int

erpret



                                                                 this result as



                                                                 normal/abnormal

.



[facility] [id number] 05:42:00





             Test Item    Value        Reference Range Interpretation Comments

 

             Segs-Bands # (test code = Segs-Bands #) 4.8          1.5-8.1       

            



[facility] CenterWtbbzpuEGQGXWHRUJ7436-58-16 05:42:00





             Test Item    Value        Reference Range Interpretation Comments

 

             Monocytes # (test code 0.7          See_Comment                [Aut

omated message] The



             = Monocytes #)                                        system which 

generated



                                                                 this result tra

nsmitted



                                                                 reference range

: <=0.8.



                                                                 The reference r

zhen was



                                                                 not used to int

erpret



                                                                 this result as



                                                                 normal/abnormal

.



[facility] Cent[id number] 05:42:00





             Test Item    Value        Reference Range Interpretation Comments

 

             Lymphocytes # (test code = Lymphocytes 1.9          1.0-5.5        

           



             #)                                                  



[facility] CenterZusywloWIXCFNLWAD5812-09-75 05:42:00





             Test Item    Value        Reference Range Interpretation Comments

 

             Monocytes (test code = Monocytes) 9.4          2.0-12.0            

      



[place] [id number] 05:42:00





             Test Item    Value        Reference Range Interpretation Comments

 

             Eosinophils # (test code 0.2          See_Comment                [A

utomated message] The



             = Eosinophils #)                                        system whic

h generated



                                                                 this result tra

nsmitted



                                                                 reference range

: <=0.5.



                                                                 The reference r

zhen was



                                                                 not used to int

erpret



                                                                 this result as



                                                                 normal/abnormal

.



[facility] CenterYlriytnBSVZDHGBVT8604-83-12 05:42:00





             Test Item    Value        Reference Range Interpretation Comments

 

             Eosinophils (test code = 2.9          See_Comment                [A

utomated message] The



             Eosinophils)                                        system which ge

nerated



                                                                 this result tra

nsmitted



                                                                 reference range

: <=4.0.



                                                                 The reference r

zhen was



                                                                 not used to int

erpret



                                                                 this result as



                                                                 normal/abnormal

.



[facility] [id number] 05:42:00





             Test Item    Value        Reference Range Interpretation Comments

 

             Segs (test code = Segs) 62.2         45.0-75.0                 



[facility] C[id number] 05:42:00





             Test Item    Value        Reference Range Interpretation Comments

 

             Lymphocytes (test code = Lymphocytes) 24.9         20.0-40.0       

          



[facility] [id number] 05:42:00





             Test Item    Value        Reference Range Interpretation Comments

 

             MCH (test code = MCH) 27.5 pg      27.0-31.0                 



[facility] CenterDntgssuGGOJNEUHNW3820-40-65 05:42:00





             Test Item    Value        Reference Range Interpretation Comments

 

             MCV (test code = MCV) 85.3         80.0-94.0                 



[facility] CenterTafakooRUALQLYKWI2169-80-76 05:42:00





             Test Item    Value        Reference Range Interpretation Comments

 

             Hct (test code = Hct) 43.9         42.0-54.0                 



[facility] CenterNuczlfhSNVGNVYSRB4649-26-13 05:42:00





             Test Item    Value        Reference Range Interpretation Comments

 

             Hgb (test code = Hgb) 14.2         14.0-18.0                 



[facility] CenterYtytadtFOQDLVPXWH4259-41-37 05:42:00





             Test Item    Value        Reference Range Interpretation Comments

 

             WBC (test code = WBC) 7.7          3.7-10.4                  



[facility] CenterRddyezmGKUBWNOVDR6401-67-05 05:42:00





             Test Item    Value        Reference Range Interpretation Comments

 

             RBC (test code = RBC) 5.15         4.70-6.10                 



[facility] CenterHwwwdaiDRNIPOQSGZ7829-63-26 05:42:00





             Test Item    Value        Reference Range Interpretation Comments

 

             MPV (test code = MPV) 8.4          7.4-10.4                  



[facility] C[id number] 05:42:00





             Test Item    Value        Reference Range Interpretation Comments

 

             MCHC (test code = MCHC) 32.3         32.0-36.0                 



[facility] CenterQgszqwySBNEWJAAWZ5430-61-07 05:42:00





             Test Item    Value        Reference Range Interpretation Comments

 

             RDW (test code = RDW) 17.3         11.5-14.5                 



[facility] CenterUheydodLLCUBOCCED0471-62-07 05:42:00





             Test Item    Value        Reference Range Interpretation Comments

 

             Platelet (test code = Platelet) 317          133-450               

    



[facility]2018-05-07 09:36:00





             Test Item    Value        Reference Range Interpretation Comments

 

             Globulin (test code = Globulin) 4.4          2.7-4.2               

    



[facility]2018-05-07 09:36:00





             Test Item    Value        Reference Range Interpretation Comments

 

             A/G Ratio (test code = A/G Ratio) 0.6 1        0.7-1.6             

      



[place] [id number] 09:36:00





             Test Item    Value        Reference Range Interpretation Comments

 

             B/C Ratio (test code = B/C Ratio) 17 1         6-25                

      



[facility]2018-05-07 09:36:00





             Test Item    Value        Reference Range Interpretation Comments

 

             AGAP (test code = AGAP) 11.3         10.0-20.0                 



[facility]2018-05-07 09:36:00





             Test Item    Value        Reference Range Interpretation Comments

 

             eGFR (test code = eGFR) 134                                    



[facility]2018-05-07 09:36:00





             Test Item    Value        Reference Range Interpretation Comments

 

             Creatinine Lvl (test code = Creatinine 0.84         0.50-1.40      

           



             Lvl)                                                



[facility]2018-05-07 09:36:00





             Test Item    Value        Reference Range Interpretation Comments

 

             Sodium Lvl (test code = Sodium Lvl) 142          135-145           

        



[facility]2018-05-07 09:36:00





             Test Item    Value        Reference Range Interpretation Comments

 

             Glucose Lvl (test code = Glucose Lvl) 99           70-99           

          



[facility]2018-05-07 09:36:00





             Test Item    Value        Reference Range Interpretation Comments

 

             BUN (test code = BUN) 14           7-22                      



[facility]2018-05-07 09:36:00





             Test Item    Value        Reference Range Interpretation Comments

 

             Alk Phos (test code = Alk Phos) 79                           

    



[facility]2018-05-07 09:36:00





             Test Item    Value        Reference Range Interpretation Comments

 

             Bili Total (test code = Bili Total) 0.3          0.2-1.3           

        



[place] 445548-05-07 09:36:00





             Test Item    Value        Reference Range Interpretation Comments

 

             AST (test code = AST) 14           See_Comment                [Auto

mated message] The



                                                                 system which ge

nerated this



                                                                 result transmit

huma



                                                                 reference range

: <=37. The



                                                                 reference range

 was not



                                                                 used to interpr

et this



                                                                 result as zayda

l/abnormal.



[place] 445548-05-07 09:36:00





             Test Item    Value        Reference Range Interpretation Comments

 

             ALT (test code = ALT) 43           See_Comment                [Auto

mated message] The



                                                                 system which ge

nerated this



                                                                 result transmit

huma



                                                                 reference range

: <=65. The



                                                                 reference range

 was not



                                                                 used to interpr

et this



                                                                 result as zayda

l/abnormal.



[place] 445548-05-07 09:36:00





             Test Item    Value        Reference Range Interpretation Comments

 

             Total Protein (test code = Total 7.1          6.4-8.4              

     



             Protein)                                            



[facility]2018-05-07 09:36:00





             Test Item    Value        Reference Range Interpretation Comments

 

             Albumin Lvl (test code = Albumin Lvl) 2.7          3.5-5.0         

          



[place] 445548-05-07 09:36:00





             Test Item    Value        Reference Range Interpretation Comments

 

             Calcium Lvl (test code = Calcium Lvl) 9.2          8.5-10.5        

          



[place] 445548-05-07 09:36:00





             Test Item    Value        Reference Range Interpretation Comments

 

             CO2 (test code = CO2) 21           24-32                     



[place] 445548-05-07 09:36:00





             Test Item    Value        Reference Range Interpretation Comments

 

             Potassium Lvl (test code = Potassium 4.3          3.5-5.1          

         



             Lvl)                                                



[place] 445548-05-07 09:36:00





             Test Item    Value        Reference Range Interpretation Comments

 

             Chloride Lvl (test code = Chloride Lvl) 114                  

            



[facility] CenterNxxzyhtVWDXRQTYJW4531-73-26 09:36:00





             Test Item    Value        Reference Range Interpretation Comments

 

             MCHC (test code = MCHC) 32.5         32.0-36.0                 



[facility] CenterReynltnHRPXJKCQOK2769-80-22 09:36:00





             Test Item    Value        Reference Range Interpretation Comments

 

             RDW (test code = RDW) 17.5         11.5-14.5                 



[facility] CenterIrszhybCRSHCLPSRP8764-78-86 09:36:00





             Test Item    Value        Reference Range Interpretation Comments

 

             Platelet (test code = Platelet) 400          133-450               

    



[facility] CenterCzrexkyQILCILWOTN8796-55-83 09:36:00





             Test Item    Value        Reference Range Interpretation Comments

 

             MPV (test code = MPV) 8.5          7.4-10.4                  



[facility] CenterZrapdxsRPLEEFAZNF0940-22-81 09:36:00





             Test Item    Value        Reference Range Interpretation Comments

 

             WBC (test code = WBC) 6.6          3.7-10.4                  



[facility] CenterWpulfstCAXSUVCIBG6915-27-83 09:36:00





             Test Item    Value        Reference Range Interpretation Comments

 

             RBC (test code = RBC) 5.17         4.70-6.10                 



[facility] CenterCmqolcxYAFWZHSQUV1027-56-15 09:36:00





             Test Item    Value        Reference Range Interpretation Comments

 

             MCV (test code = MCV) 86.1         80.0-94.0                 



[facility] CenterGnhaxsuEQRODFXYKQ4474-94-23 09:36:00





             Test Item    Value        Reference Range Interpretation Comments

 

             Hct (test code = Hct) 44.5         42.0-54.0                 



[facility] CenterCgujantMBATVOQYVW7104-02-91 09:36:00





             Test Item    Value        Reference Range Interpretation Comments

 

             MCH (test code = MCH) 28.0 pg      27.0-31.0                 



[facility] CenterPyhrxihQVLMOQQPSX0160-31-41 09:36:00





             Test Item    Value        Reference Range Interpretation Comments

 

             Hgb (test code = Hgb) 14.5         14.0-18.0                 



[facility] CenterGopkdioSGMOQKXYTW7658-10-59 09:36:00





             Test Item    Value        Reference Range Interpretation Comments

 

             Lymphocytes # (test code = Lymphocytes 1.7          1.0-5.5        

           



             #)                                                  



[facility] CenterWuhxowyXDEKOSHWSB7316-42-08 09:36:00





             Test Item    Value        Reference Range Interpretation Comments

 

             Monocytes # (test code 0.7          See_Comment                [Aut

omated message] The



             = Monocytes #)                                        system which 

generated



                                                                 this result tra

nsmitted



                                                                 reference range

: <=0.8.



                                                                 The reference r

zhen was



                                                                 not used to int

erpret



                                                                 this result as



                                                                 normal/abnormal

.



[facility] CenterXqicnbzMJGMTQJOOI5787-46-13 09:36:00





             Test Item    Value        Reference Range Interpretation Comments

 

             Eosinophils # (test code 0.2          See_Comment                [A

utomated message] The



             = Eosinophils #)                                        system whic

h generated



                                                                 this result tra

nsmitted



                                                                 reference range

: <=0.5.



                                                                 The reference r

zhen was



                                                                 not used to int

erpret



                                                                 this result as



                                                                 normal/abnormal

.



[facility] CenterFusifjmUOYGQDZKTC9774-86-89 09:36:00





             Test Item    Value        Reference Range Interpretation Comments

 

             Lymphocytes (test code = Lymphocytes) 26.1         20.0-40.0       

          



[facility] CenterXrwevipFPEFHYASUY3038-05-18 09:36:00





             Test Item    Value        Reference Range Interpretation Comments

 

             Segs (test code = Segs) 59.3         45.0-75.0                 



[facility] CenterFunowvxAVECOCQMMR4000-08-71 09:36:00





             Test Item    Value        Reference Range Interpretation Comments

 

             Basophils (test code = 0.8          See_Comment                [Aut

omated message] The



             Basophils)                                          system which ge

nerated



                                                                 this result tra

nsmitted



                                                                 reference range

: <=1.0.



                                                                 The reference r

zhen was



                                                                 not used to int

erpret



                                                                 this result as



                                                                 normal/abnormal

.



[facility] CenterNzrjkxqPQQSUPZFJZ5676-13-78 09:36:00





             Test Item    Value        Reference Range Interpretation Comments

 

             Monocytes (test code = Monocytes) 10.5         2.0-12.0            

      



[facility] CenterZwbuhpwSBCASVTJRM8409-03-73 09:36:00





             Test Item    Value        Reference Range Interpretation Comments

 

             Eosinophils (test code = 3.3          See_Comment                [A

utomated message] The



             Eosinophils)                                        system which ge

nerated



                                                                 this result tra

nsmitted



                                                                 reference range

: <=4.0.



                                                                 The reference r

zhen was



                                                                 not used to int

erpret



                                                                 this result as



                                                                 normal/abnormal

.



[facility] CenterVhkputgTAAXTGLQEM5938-80-91 09:36:00





             Test Item    Value        Reference Range Interpretation Comments

 

             Segs-Bands # (test code = Segs-Bands #) 3.9          1.5-8.1       

            



[facility] NJLGE7990[facility]





             Test Item    Value        Reference Range Interpretation Comments

 

             Globulin (test code = Globulin) 4.4          2.7-4.2               

    



[facility]2018-05-07 09:36:00





             Test Item    Value        Reference Range Interpretation Comments

 

             A/G Ratio (test code = A/G Ratio) 0.6 1        0.7-1.6             

      



[facility]2018-05-07 09:36:00





             Test Item    Value        Reference Range Interpretation Comments

 

             B/C Ratio (test code = B/C Ratio) 17 1         6-25                

      



[facility]2018-05-07 09:36:00





             Test Item    Value        Reference Range Interpretation Comments

 

             AGAP (test code = AGAP) 11.3         10.0-20.0                 



[place] 445548-05-07 09:36:00





             Test Item    Value        Reference Range Interpretation Comments

 

             eGFR (test code = eGFR) 134                                    



[place] 445548-05-07 09:36:00





             Test Item    Value        Reference Range Interpretation Comments

 

             Creatinine Lvl (test code = Creatinine 0.84         0.50-1.40      

           



             Lvl)                                                



[facility]2018-05-07 09:36:00





             Test Item    Value        Reference Range Interpretation Comments

 

             Sodium Lvl (test code = Sodium Lvl) 142          135-145           

        



[place] 445548-05-07 09:36:00





             Test Item    Value        Reference Range Interpretation Comments

 

             Glucose Lvl (test code = Glucose Lvl) 99           70-99           

          



[place] 445548-05-07 09:36:00





             Test Item    Value        Reference Range Interpretation Comments

 

             BUN (test code = BUN) 14           7-22                      



[place] 445548-05-07 09:36:00





             Test Item    Value        Reference Range Interpretation Comments

 

             Alk Phos (test code = Alk Phos) 79                           

    



[place] 445548-05-07 09:36:00





             Test Item    Value        Reference Range Interpretation Comments

 

             Bili Total (test code = Bili Total) 0.3          0.2-1.3           

        



[place] 445548-05-07 09:36:00





             Test Item    Value        Reference Range Interpretation Comments

 

             AST (test code = AST) 14           See_Comment                [Auto

mated message] The



                                                                 system which ge

nerated this



                                                                 result transmit

huma



                                                                 reference range

: <=37. The



                                                                 reference range

 was not



                                                                 used to interpr

et this



                                                                 result as zayda

l/abnormal.



[facility]2018-05-07 09:36:00





             Test Item    Value        Reference Range Interpretation Comments

 

             ALT (test code = ALT) 43           See_Comment                [Auto

mated message] The



                                                                 system which ge

nerated this



                                                                 result transmit

huma



                                                                 reference range

: <=65. The



                                                                 reference range

 was not



                                                                 used to interpr

et this



                                                                 result as zayda

l/abnormal.



[place] 445548-05-07 09:36:00





             Test Item    Value        Reference Range Interpretation Comments

 

             Total Protein (test code = Total 7.1          6.4-8.4              

     



             Protein)                                            



[facility]2018-05-07 09:36:00





             Test Item    Value        Reference Range Interpretation Comments

 

             Albumin Lvl (test code = Albumin Lvl) 2.7          3.5-5.0         

          



[facility]2018-05-07 09:36:00





             Test Item    Value        Reference Range Interpretation Comments

 

             Calcium Lvl (test code = Calcium Lvl) 9.2          8.5-10.5        

          



[facility]2018-05-07 09:36:00





             Test Item    Value        Reference Range Interpretation Comments

 

             CO2 (test code = CO2) 21           24-32                     



[facility]2018-05-07 09:36:00





             Test Item    Value        Reference Range Interpretation Comments

 

             Potassium Lvl (test code = Potassium 4.3          3.5-5.1          

         



             Lvl)                                                



[facility]2018-05-07 09:36:00





             Test Item    Value        Reference Range Interpretation Comments

 

             Chloride Lvl (test code = Chloride Lvl) 114                  

            



[facility] CenterBzzfjzpYDKEAOWJIS0606-49-04 09:36:00





             Test Item    Value        Reference Range Interpretation Comments

 

             MCHC (test code = MCHC) 32.5         32.0-36.0                 



[facility] CenterVopkrvlADCLOMHIGP0407-74-63 09:36:00





             Test Item    Value        Reference Range Interpretation Comments

 

             RDW (test code = RDW) 17.5         11.5-14.5                 



[facility] CenterQosigvlIALVKJAQRO0996-67-05 09:36:00





             Test Item    Value        Reference Range Interpretation Comments

 

             Platelet (test code = Platelet) 400          133-450               

    



[facility] CenterAwygzprNFAKMXDVAY2898-63-30 09:36:00





             Test Item    Value        Reference Range Interpretation Comments

 

             MPV (test code = MPV) 8.5          7.4-10.4                  



[facility] CenterHqghfslXPOGFNRUUD1804-82-43 09:36:00





             Test Item    Value        Reference Range Interpretation Comments

 

             WBC (test code = WBC) 6.6          3.7-10.4                  



[facility] CenterLweifluZUQSLPJXIR9041-73-90 09:36:00





             Test Item    Value        Reference Range Interpretation Comments

 

             RBC (test code = RBC) 5.17         4.70-6.10                 



[facility] CenterNzbixuyKGHATGBJPB2967-98-76 09:36:00





             Test Item    Value        Reference Range Interpretation Comments

 

             MCV (test code = MCV) 86.1         80.0-94.0                 



[facility] CenterFfavbgpLHTZTDNTFW0108-72-33 09:36:00





             Test Item    Value        Reference Range Interpretation Comments

 

             Hct (test code = Hct) 44.5         42.0-54.0                 



[facility] CenterZupocbiWYSQVOAPUA0432-41-06 09:36:00





             Test Item    Value        Reference Range Interpretation Comments

 

             MCH (test code = MCH) 28.0 pg      27.0-31.0                 



[facility] CenterZkqludrURDYGBJNCF2541-90-02 09:36:00





             Test Item    Value        Reference Range Interpretation Comments

 

             Hgb (test code = Hgb) 14.5         14.0-18.0                 



[facility] CenterXhrmeewQMUIAGXQGP8865-50-86 09:36:00





             Test Item    Value        Reference Range Interpretation Comments

 

             Lymphocytes # (test code = Lymphocytes 1.7          1.0-5.5        

           



             #)                                                  



[facility] CenterDshnxwrIFEZHUNWNA1160-40-35 09:36:00





             Test Item    Value        Reference Range Interpretation Comments

 

             Monocytes # (test code 0.7          See_Comment                [Aut

omated message] The



             = Monocytes #)                                        system which 

generated



                                                                 this result tra

nsmitted



                                                                 reference range

: <=0.8.



                                                                 The reference r

zhen was



                                                                 not used to int

erpret



                                                                 this result as



                                                                 normal/abnormal

.



[facility] CenterYnkcpqjDXFVDOROVA1328-79-05 09:36:00





             Test Item    Value        Reference Range Interpretation Comments

 

             Eosinophils # (test code 0.2          See_Comment                [A

utomated message] The



             = Eosinophils #)                                        system whic

h generated



                                                                 this result tra

nsmitted



                                                                 reference range

: <=0.5.



                                                                 The reference r

zhen was



                                                                 not used to int

erpret



                                                                 this result as



                                                                 normal/abnormal

.



[facility] CenterKiwkaayVZMKORQAIM7457-75-90 09:36:00





             Test Item    Value        Reference Range Interpretation Comments

 

             Lymphocytes (test code = Lymphocytes) 26.1         20.0-40.0       

          



[facility] CenterOxlpgwgREWSCBPCRG8701-36-79 09:36:00





             Test Item    Value        Reference Range Interpretation Comments

 

             Segs (test code = Segs) 59.3         45.0-75.0                 



[facility] CenterZzzbjmyHEPEJIUQBJ0076-95-14 09:36:00





             Test Item    Value        Reference Range Interpretation Comments

 

             Basophils (test code = 0.8          See_Comment                [Aut

omated message] The



             Basophils)                                          system which ge

nerated



                                                                 this result tra

nsmitted



                                                                 reference range

: <=1.0.



                                                                 The reference r

zhen was



                                                                 not used to int

erpret



                                                                 this result as



                                                                 normal/abnormal

.



[facility] CenterYkhygtvVTBHECYWUR9583-85-79 09:36:00





             Test Item    Value        Reference Range Interpretation Comments

 

             Monocytes (test code = Monocytes) 10.5         2.0-12.0            

      



[facility] CenterHbialubAPUYCBEAUO8255-72-81 09:36:00





             Test Item    Value        Reference Range Interpretation Comments

 

             Eosinophils (test code = 3.3          See_Comment                [A

utomated message] The



             Eosinophils)                                        system which ge

nerated



                                                                 this result tra

nsmitted



                                                                 reference range

: <=4.0.



                                                                 The reference r

zhen was



                                                                 not used to int

erpret



                                                                 this result as



                                                                 normal/abnormal

.



[place] 512098-05-07 09:36:00





             Test Item    Value        Reference Range Interpretation Comments

 

             Segs-Bands # (test code = Segs-Bands #) 3.9          1.5-8.1       

            



[place] 445548-05-07 09:36:00





             Test Item    Value        Reference Range Interpretation Comments

 

             Globulin (test code = Globulin) 4.4          2.7-4.2               

    



[place] 445548-05-07 09:36:00





             Test Item    Value        Reference Range Interpretation Comments

 

             A/G Ratio (test code = A/G Ratio) 0.6 1        0.7-1.6             

      



[place] 445548-05-07 09:36:00





             Test Item    Value        Reference Range Interpretation Comments

 

             B/C Ratio (test code = B/C Ratio) 17 1         6-25                

      



[facility]2018-05-07 09:36:00





             Test Item    Value        Reference Range Interpretation Comments

 

             AGAP (test code = AGAP) 11.3         10.0-20.0                 



[facility]2018-05-07 09:36:00





             Test Item    Value        Reference Range Interpretation Comments

 

             eGFR (test code = eGFR) 134                                    



[facility]2018-05-07 09:36:00





             Test Item    Value        Reference Range Interpretation Comments

 

             Creatinine Lvl (test code = Creatinine 0.84         0.50-1.40      

           



             Lvl)                                                



[facility]2018-05-07 09:36:00





             Test Item    Value        Reference Range Interpretation Comments

 

             Sodium Lvl (test code = Sodium Lvl) 142          135-145           

        



[facility]2018-05-07 09:36:00





             Test Item    Value        Reference Range Interpretation Comments

 

             Glucose Lvl (test code = Glucose Lvl) 99           70-99           

          



[facility]2018-05-07 09:36:00





             Test Item    Value        Reference Range Interpretation Comments

 

             BUN (test code = BUN) 14           7-22                      



[facility]2018-05-07 09:36:00





             Test Item    Value        Reference Range Interpretation Comments

 

             Alk Phos (test code = Alk Phos) 79                           

    



[facility]2018-05-07 09:36:00





             Test Item    Value        Reference Range Interpretation Comments

 

             Bili Total (test code = Bili Total) 0.3          0.2-1.3           

        



[facility]2018-05-07 09:36:00





             Test Item    Value        Reference Range Interpretation Comments

 

             AST (test code = AST) 14           See_Comment                [Auto

mated message] The



                                                                 system which ge

nerated this



                                                                 result transmit

huma



                                                                 reference range

: <=37. The



                                                                 reference range

 was not



                                                                 used to interpr

et this



                                                                 result as zayda

l/abnormal.



[facility]2018-05-07 09:36:00





             Test Item    Value        Reference Range Interpretation Comments

 

             ALT (test code = ALT) 43           See_Comment                [Auto

mated message] The



                                                                 system which ge

nerated this



                                                                 result transmit

huma



                                                                 reference range

: <=65. The



                                                                 reference range

 was not



                                                                 used to interpr

et this



                                                                 result as zayda

l/abnormal.



[facility]2018-05-07 09:36:00





             Test Item    Value        Reference Range Interpretation Comments

 

             Total Protein (test code = Total 7.1          6.4-8.4              

     



             Protein)                                            



[facility]2018-05-07 09:36:00





             Test Item    Value        Reference Range Interpretation Comments

 

             Albumin Lvl (test code = Albumin Lvl) 2.7          3.5-5.0         

          



[facility]2018-05-07 09:36:00





             Test Item    Value        Reference Range Interpretation Comments

 

             Calcium Lvl (test code = Calcium Lvl) 9.2          8.5-10.5        

          



[facility]2018-05-07 09:36:00





             Test Item    Value        Reference Range Interpretation Comments

 

             CO2 (test code = CO2) 21           24-32                     



[facility]2018-05-07 09:36:00





             Test Item    Value        Reference Range Interpretation Comments

 

             Potassium Lvl (test code = Potassium 4.3          3.5-5.1          

         



             Lvl)                                                



[facility]2018-05-07 09:36:00





             Test Item    Value        Reference Range Interpretation Comments

 

             Chloride Lvl (test code = Chloride Lvl) 114                  

            



[facility] CenterBpyjoyyICYEQJIHIA5181-94-02 09:36:00





             Test Item    Value        Reference Range Interpretation Comments

 

             MCHC (test code = MCHC) 32.5         32.0-36.0                 



[facility] CenterDyijundPACAIAFDER2052-19-09 09:36:00





             Test Item    Value        Reference Range Interpretation Comments

 

             RDW (test code = RDW) 17.5         11.5-14.5                 



[facility] CenterGqwiwebYZMYTWVSUV7542-03-62 09:36:00





             Test Item    Value        Reference Range Interpretation Comments

 

             Platelet (test code = Platelet) 400          133-450               

    



[facility] CenterZpndjugAXSIUNNYPM6192-17-60 09:36:00





             Test Item    Value        Reference Range Interpretation Comments

 

             MPV (test code = MPV) 8.5          7.4-10.4                  



[facility] CenterGlzwupkWXRAKVFDJM5654-92-53 09:36:00





             Test Item    Value        Reference Range Interpretation Comments

 

             WBC (test code = WBC) 6.6          3.7-10.4                  



[facility] CenterGtswpydIECVOFAIBW8798-75-66 09:36:00





             Test Item    Value        Reference Range Interpretation Comments

 

             RBC (test code = RBC) 5.17         4.70-6.10                 



[facility] CenterXggtqnvJGTGVUTHAD1315-08-11 09:36:00





             Test Item    Value        Reference Range Interpretation Comments

 

             MCV (test code = MCV) 86.1         80.0-94.0                 



[facility] CenterNnuczumXOPQWOMUWM1856-96-64 09:36:00





             Test Item    Value        Reference Range Interpretation Comments

 

             Hct (test code = Hct) 44.5         42.0-54.0                 



[facility] CenterFhkyjquSEUMQPXNPJ2218-49-46 09:36:00





             Test Item    Value        Reference Range Interpretation Comments

 

             MCH (test code = MCH) 28.0 pg      27.0-31.0                 



[facility] CenterPcxcoobNBEJAUOCZC5373-22-24 09:36:00





             Test Item    Value        Reference Range Interpretation Comments

 

             Hgb (test code = Hgb) 14.5         14.0-18.0                 



[facility] CenterVwmnsuyRXLDNEYWHI7967-26-11 09:36:00





             Test Item    Value        Reference Range Interpretation Comments

 

             Lymphocytes # (test code = Lymphocytes 1.7          1.0-5.5        

           



             #)                                                  



[facility] CenterVgdfqioQIDGGFMQWS9640-83-74 09:36:00





             Test Item    Value        Reference Range Interpretation Comments

 

             Monocytes # (test code 0.7          See_Comment                [Aut

omated message] The



             = Monocytes #)                                        system which 

generated



                                                                 this result tra

nsmitted



                                                                 reference range

: <=0.8.



                                                                 The reference r

zhen was



                                                                 not used to int

erpret



                                                                 this result as



                                                                 normal/abnormal

.



[facility] CenterCtvkcggHFQNAHHGYD3205-06-35 09:36:00





             Test Item    Value        Reference Range Interpretation Comments

 

             Eosinophils # (test code 0.2          See_Comment                [A

utomated message] The



             = Eosinophils #)                                        system whic

h generated



                                                                 this result tra

nsmitted



                                                                 reference range

: <=0.5.



                                                                 The reference r

zhen was



                                                                 not used to int

erpret



                                                                 this result as



                                                                 normal/abnormal

.



[facility] CenterWacwhvzQYOFRDGONQ9544-91-65 09:36:00





             Test Item    Value        Reference Range Interpretation Comments

 

             Lymphocytes (test code = Lymphocytes) 26.1         20.0-40.0       

          



[facility] CenterUqtsjwbHFVVGYWAFW6779-29-68 09:36:00





             Test Item    Value        Reference Range Interpretation Comments

 

             Segs (test code = Segs) 59.3         45.0-75.0                 



[facility] CenterGxrdbknBJKFXNMYAS0832-72-19 09:36:00





             Test Item    Value        Reference Range Interpretation Comments

 

             Basophils (test code = 0.8          See_Comment                [Aut

omated message] The



             Basophils)                                          system which ge

nerated



                                                                 this result tra

nsmitted



                                                                 reference range

: <=1.0.



                                                                 The reference r

zhen was



                                                                 not used to int

erpret



                                                                 this result as



                                                                 normal/abnormal

.



[facility] CenterSjtrndxWWVNWVONGV2029-12-35 09:36:00





             Test Item    Value        Reference Range Interpretation Comments

 

             Monocytes (test code = Monocytes) 10.5         2.0-12.0            

      



[facility] CenterZenbpjzSFOUTQMPCI6641-47-58 09:36:00





             Test Item    Value        Reference Range Interpretation Comments

 

             Eosinophils (test code = 3.3          See_Comment                [A

utomated message] The



             Eosinophils)                                        system which ge

nerated



                                                                 this result tra

nsmitted



                                                                 reference range

: <=4.0.



                                                                 The reference r

zhen was



                                                                 not used to int

erpret



                                                                 this result as



                                                                 normal/abnormal

.



[facility] CenterOevhbizQNFGYMVCJD5526-09-67 09:36:00





             Test Item    Value        Reference Range Interpretation Comments

 

             Segs-Bands # (test code = Segs-Bands #) 3.9          1.5-8.1       

            



[facility] [id number]





             Test Item    Value        Reference Range Interpretation Comments

 

             Globulin (test code = Globulin) 4.4          2.7-4.2               

    



[facility] RBYHZ4286-95-37 09:36:00





             Test Item    Value        Reference Range Interpretation Comments

 

             A/G Ratio (test code = A/G Ratio) 0.6 1        0.7-1.6             

      



[place] 445548-05-07 09:36:00





             Test Item    Value        Reference Range Interpretation Comments

 

             B/C Ratio (test code = B/C Ratio) 17 1         6-25                

      



[place] 445548-05-07 09:36:00





             Test Item    Value        Reference Range Interpretation Comments

 

             AGAP (test code = AGAP) 11.3         10.0-20.0                 



[place] [address]-05-07 09:36:00





             Test Item    Value        Reference Range Interpretation Comments

 

             eGFR (test code = eGFR) 134                                    



[place] 445548-05-07 09:36:00





             Test Item    Value        Reference Range Interpretation Comments

 

             Creatinine Lvl (test code = Creatinine 0.84         0.50-1.40      

           



             Lvl)                                                



[place] 445548-05-07 09:36:00





             Test Item    Value        Reference Range Interpretation Comments

 

             Sodium Lvl (test code = Sodium Lvl) 142          135-145           

        



[place] 445548-05-07 09:36:00





             Test Item    Value        Reference Range Interpretation Comments

 

             Glucose Lvl (test code = Glucose Lvl) 99           70-99           

          



[place] 445548-05-07 09:36:00





             Test Item    Value        Reference Range Interpretation Comments

 

             BUN (test code = BUN) 14           7-22                      



[place] 445548-05-07 09:36:00





             Test Item    Value        Reference Range Interpretation Comments

 

             Alk Phos (test code = Alk Phos) 79                           

    



[place] 445548-05-07 09:36:00





             Test Item    Value        Reference Range Interpretation Comments

 

             Bili Total (test code = Bili Total) 0.3          0.2-1.3           

        



[place] 445548-05-07 09:36:00





             Test Item    Value        Reference Range Interpretation Comments

 

             AST (test code = AST) 14           See_Comment                [Auto

mated message] The



                                                                 system which ge

nerated this



                                                                 result transmit

huma



                                                                 reference range

: <=37. The



                                                                 reference range

 was not



                                                                 used to interpr

et this



                                                                 result as zayda

l/abnormal.



[place] 445548-05-07 09:36:00





             Test Item    Value        Reference Range Interpretation Comments

 

             ALT (test code = ALT) 43           See_Comment                [Auto

mated message] The



                                                                 system which ge

nerated this



                                                                 result transmit

huma



                                                                 reference range

: <=65. The



                                                                 reference range

 was not



                                                                 used to interpr

et this



                                                                 result as zayda

l/abnormal.



[facility]2018-05-07 09:36:00





             Test Item    Value        Reference Range Interpretation Comments

 

             Total Protein (test code = Total 7.1          6.4-8.4              

     



             Protein)                                            



[facility]2018-05-07 09:36:00





             Test Item    Value        Reference Range Interpretation Comments

 

             Albumin Lvl (test code = Albumin Lvl) 2.7          3.5-5.0         

          



[place] 445548-05-07 09:36:00





             Test Item    Value        Reference Range Interpretation Comments

 

             Calcium Lvl (test code = Calcium Lvl) 9.2          8.5-10.5        

          



[facility]2018-05-07 09:36:00





             Test Item    Value        Reference Range Interpretation Comments

 

             CO2 (test code = CO2) 21           24-32                     



[facility]2018-05-07 09:36:00





             Test Item    Value        Reference Range Interpretation Comments

 

             Potassium Lvl (test code = Potassium 4.3          3.5-5.1          

         



             Lvl)                                                



[facility]2018-05-07 09:36:00





             Test Item    Value        Reference Range Interpretation Comments

 

             Chloride Lvl (test code = Chloride Lvl) 114                  

            



[facility] CenterFeeprwmKXKRKLPOSH9072-03-93 09:36:00





             Test Item    Value        Reference Range Interpretation Comments

 

             MCHC (test code = MCHC) 32.5         32.0-36.0                 



[facility] CenterQwibxxuYCTNNXGQLE6619-81-72 09:36:00





             Test Item    Value        Reference Range Interpretation Comments

 

             RDW (test code = RDW) 17.5         11.5-14.5                 



[facility] CenterBwiljckPSROPNIKHL9114-32-29 09:36:00





             Test Item    Value        Reference Range Interpretation Comments

 

             Platelet (test code = Platelet) 400          133-450               

    



[facility] CenterAahomnsJNSNIBBLCR0816-00-35 09:36:00





             Test Item    Value        Reference Range Interpretation Comments

 

             MPV (test code = MPV) 8.5          7.4-10.4                  



[facility] CenterTowgkutKZVTAYCMXC0797-06-46 09:36:00





             Test Item    Value        Reference Range Interpretation Comments

 

             WBC (test code = WBC) 6.6          3.7-10.4                  



[facility] CenterJphucfpSVEWUHJTRL5542-89-80 09:36:00





             Test Item    Value        Reference Range Interpretation Comments

 

             RBC (test code = RBC) 5.17         4.70-6.10                 



[place] 512098-05-07 09:36:00





             Test Item    Value        Reference Range Interpretation Comments

 

             MCV (test code = MCV) 86.1         80.0-94.0                 



[facility] CenterZwclqngOJPXPMBUER4955-17-67 09:36:00





             Test Item    Value        Reference Range Interpretation Comments

 

             Hct (test code = Hct) 44.5         42.0-54.0                 



[facility] CenterYpakdtcPMEUFAULFK6263-70-16 09:36:00





             Test Item    Value        Reference Range Interpretation Comments

 

             MCH (test code = MCH) 28.0 pg      27.0-31.0                 



[facility] CenterMaeamncBFHBNAADHW0065-57-04 09:36:00





             Test Item    Value        Reference Range Interpretation Comments

 

             Hgb (test code = Hgb) 14.5         14.0-18.0                 



[facility] CenterChwulpyFCODIAQFTL9876-69-98 09:36:00





             Test Item    Value        Reference Range Interpretation Comments

 

             Lymphocytes # (test code = Lymphocytes 1.7          1.0-5.5        

           



             #)                                                  



[facility] CenterAayiwytAGISSYBIED2822-95-17 09:36:00





             Test Item    Value        Reference Range Interpretation Comments

 

             Monocytes # (test code 0.7          See_Comment                [Aut

omated message] The



             = Monocytes #)                                        system which 

generated



                                                                 this result tra

nsmitted



                                                                 reference range

: <=0.8.



                                                                 The reference r

zhen was



                                                                 not used to int

erpret



                                                                 this result as



                                                                 normal/abnormal

.



[facility] CenterKzfniuhESJVJLUDEW5331-60-01 09:36:00





             Test Item    Value        Reference Range Interpretation Comments

 

             Eosinophils # (test code 0.2          See_Comment                [A

utomated message] The



             = Eosinophils #)                                        system whic

h generated



                                                                 this result tra

nsmitted



                                                                 reference range

: <=0.5.



                                                                 The reference r

zhen was



                                                                 not used to int

erpret



                                                                 this result as



                                                                 normal/abnormal

.



[facility] CenterAysmszsXFZAUPEPSF6246-60-87 09:36:00





             Test Item    Value        Reference Range Interpretation Comments

 

             Lymphocytes (test code = Lymphocytes) 26.1         20.0-40.0       

          



[facility] CenterMkwndnwBRMUWVBMXO5405-64-34 09:36:00





             Test Item    Value        Reference Range Interpretation Comments

 

             Segs (test code = Segs) 59.3         45.0-75.0                 



[facility] CenterDpgzwbcUSBRANFJFI3900-54-00 09:36:00





             Test Item    Value        Reference Range Interpretation Comments

 

             Basophils (test code = 0.8          See_Comment                [Aut

omated message] The



             Basophils)                                          system which ge

nerated



                                                                 this result tra

nsmitted



                                                                 reference range

: <=1.0.



                                                                 The reference r

zhen was



                                                                 not used to int

erpret



                                                                 this result as



                                                                 normal/abnormal

.



[facility] CenterQlucrwoOUCMIHORAV5241-02-01 09:36:00





             Test Item    Value        Reference Range Interpretation Comments

 

             Monocytes (test code = Monocytes) 10.5         2.0-12.0            

      



[facility] CenterZgumtjlVUZJSWNRTM6591-04-58 09:36:00





             Test Item    Value        Reference Range Interpretation Comments

 

             Eosinophils (test code = 3.3          See_Comment                [A

utomated message] The



             Eosinophils)                                        system which ge

nerated



                                                                 this result tra

nsmitted



                                                                 reference range

: <=4.0.



                                                                 The reference r

zhen was



                                                                 not used to int

erpret



                                                                 this result as



                                                                 normal/abnormal

.



[facility] CenterFsufslwADEBBJOZZN9146-65-84 09:36:00





             Test Item    Value        Reference Range Interpretation Comments

 

             Segs-Bands # (test code = Segs-Bands #) 3.9          1.5-8.1       

            



[facility] ClinicGtityngGKXJOABAOO7041-97-60 21:02:00





             Test Item    Value        Reference Range Interpretation Comments

 

             Vanco Tr TND (test code = Vanco Tr 15:30pm                         

       



             TND)                                                



[facility] HospitalOqoitkuQESVDDTXXC6900-76-26 21:02:00





             Test Item    Value        Reference Range Interpretation Comments

 

             Vanco Tr (test code = Vanco Tr) 9.1                                

    



[facility]GjvzzylPTQVMKHMSA5611-19-23 21:02:00





             Test Item    Value        Reference Range Interpretation Comments

 

             Vanco Tr TND (test code = Vanco Tr 15:30pm                         

       



             TND)                                                



[facility]DkxmrgtGVABFDPBFT2275-66-35 21:02:00





             Test Item    Value        Reference Range Interpretation Comments

 

             Vanco Tr (test code = Vanco Tr) 9.1                                

    



[facility] CenterYxozklsTDQBQIKJFA7923-77-05 21:02:00





             Test Item    Value        Reference Range Interpretation Comments

 

             Vanco Tr TND (test code = Vanco Tr 15:30pm                         

       



             TND)                                                



[facility] CenterRfxsdvyXZWOMCOQIA3332-70-83 21:02:00





             Test Item    Value        Reference Range Interpretation Comments

 

             Vanco Tr (test code = Vanco Tr) 9.1                                

    



[facility] HospitalCzfgzptEQZXRPMYXV2279-91-34 21:02:00





             Test Item    Value        Reference Range Interpretation Comments

 

             Vanco Tr TND (test code = Vanco Tr 15:30pm                         

       



             TND)                                                



[facility] CenterPgzseikCMFOEELKPA2865-62-54 21:02:00





             Test Item    Value        Reference Range Interpretation Comments

 

             Vanco Tr (test code = Vanco Tr) 9.1                                

    



[facility]2018-05-06 07:07:00





             Test Item    Value        Reference Range Interpretation Comments

 

             Glucose Lvl (test code = Glucose Lvl) 74           70-99           

          



[facility]2018-05-06 07:07:00





             Test Item    Value        Reference Range Interpretation Comments

 

             BUN (test code = BUN) 12           7-22                      



[facility]2018-05-06 07:07:00





             Test Item    Value        Reference Range Interpretation Comments

 

             Creatinine Lvl (test code = Creatinine 0.75         0.50-1.40      

           



             Lvl)                                                



[facility]2018-05-06 07:07:00





             Test Item    Value        Reference Range Interpretation Comments

 

             eGFR (test code = eGFR) 140                                    



[facility]2018-05-06 07:07:00





             Test Item    Value        Reference Range Interpretation Comments

 

             Albumin Lvl (test code = Albumin Lvl) 2.9          3.5-5.0         

          



[facility]2018-05-06 07:07:00





             Test Item    Value        Reference Range Interpretation Comments

 

             Globulin (test code = Globulin) 4.4          2.7-4.2               

    



[place] 445548-05-06 07:07:00





             Test Item    Value        Reference Range Interpretation Comments

 

             A/G Ratio (test code = A/G Ratio) 0.7 1        0.7-1.6             

      



[facility]2018-05-06 07:07:00





             Test Item    Value        Reference Range Interpretation Comments

 

             Bili Total (test code = Bili Total) 0.4          0.2-1.3           

        



[facility]2018-05-06 07:07:00





             Test Item    Value        Reference Range Interpretation Comments

 

             Alk Phos (test code = Alk Phos) 71                           

    



[place] 445548-05-06 07:07:00





             Test Item    Value        Reference Range Interpretation Comments

 

             AST (test code = AST) 15           See_Comment                [Auto

mated message] The



                                                                 system which ge

nerated this



                                                                 result transmit

huma



                                                                 reference range

: <=37. The



                                                                 reference range

 was not



                                                                 used to interpr

et this



                                                                 result as zayda

l/abnormal.



[facility]2018-05-06 07:07:00





             Test Item    Value        Reference Range Interpretation Comments

 

             ALT (test code = ALT) 28           See_Comment                [Auto

mated message] The



                                                                 system which ge

nerated this



                                                                 result transmit

huma



                                                                 reference range

: <=65. The



                                                                 reference range

 was not



                                                                 used to interpr

et this



                                                                 result as zayda

l/abnormal.



[facility]2018-05-06 07:07:00





             Test Item    Value        Reference Range Interpretation Comments

 

             Potassium Lvl (test code = Potassium 4.4          3.5-5.1          

         



             Lvl)                                                



[facility]2018-05-06 07:07:00





             Test Item    Value        Reference Range Interpretation Comments

 

             Sodium Lvl (test code = Sodium Lvl) 147          135-145           

        



[facility]2018-05-06 07:07:00





             Test Item    Value        Reference Range Interpretation Comments

 

             CO2 (test code = CO2) 25           24-32                     



[facility]2018-05-06 07:07:00





             Test Item    Value        Reference Range Interpretation Comments

 

             Chloride Lvl (test code = Chloride Lvl) 114                  

            



[facility]2018-05-06 07:07:00





             Test Item    Value        Reference Range Interpretation Comments

 

             B/C Ratio (test code = B/C Ratio) 16 1         6-25                

      



[place] 445548-05-06 07:07:00





             Test Item    Value        Reference Range Interpretation Comments

 

             Calcium Lvl (test code = Calcium Lvl) 8.7          8.5-10.5        

          



[facility]2018-05-06 07:07:00





             Test Item    Value        Reference Range Interpretation Comments

 

             AGAP (test code = AGAP) 12.4         10.0-20.0                 



[facility]2018-05-06 07:07:00





             Test Item    Value        Reference Range Interpretation Comments

 

             Total Protein (test code = Total 7.3          6.4-8.4              

     



             Protein)                                            



[facility] CenterPbvapbpZYPTWYGCED7289-66-38 07:07:00





             Test Item    Value        Reference Range Interpretation Comments

 

             Platelet (test code = Platelet) 345          133-450               

    



[facility] CenterWrwyzknRUXUOVKPRO1426-22-38 07:07:00





             Test Item    Value        Reference Range Interpretation Comments

 

             MPV (test code = MPV) 8.7          7.4-10.4                  



[facility] CenterYexubsfZXHSHUSVSL8792-08-07 07:07:00





             Test Item    Value        Reference Range Interpretation Comments

 

             WBC (test code = WBC) 6.9          3.7-10.4                  



[place] 512098-05-06 07:07:00





             Test Item    Value        Reference Range Interpretation Comments

 

             RBC (test code = RBC) 5.30         4.70-6.10                 



[facility] CenterHefqbwtYGRTUITTDL5509-91-03 07:07:00





             Test Item    Value        Reference Range Interpretation Comments

 

             MCHC (test code = MCHC) 32.8         32.0-36.0                 



[facility] CenterIrxublrKXTAGBQPIY2744-88-28 07:07:00





             Test Item    Value        Reference Range Interpretation Comments

 

             MCH (test code = MCH) 27.9 pg      27.0-31.0                 



[facility] CenterZerpudmQBOROHTQEU1339-07-77 07:07:00





             Test Item    Value        Reference Range Interpretation Comments

 

             Hgb (test code = Hgb) 14.8         14.0-18.0                 



[facility] CenterMjfqaqyOFLBQCTQMS0570-42-48 07:07:00





             Test Item    Value        Reference Range Interpretation Comments

 

             MCV (test code = MCV) 85.0         80.0-94.0                 



[facility] CenterDbhbkfuAYAZHVGGQA4792-52-74 07:07:00





             Test Item    Value        Reference Range Interpretation Comments

 

             Hct (test code = Hct) 45.1         42.0-54.0                 



[facility] CenterTbkkdpyCBMWIQYSIF9502-93-05 07:07:00





             Test Item    Value        Reference Range Interpretation Comments

 

             RDW (test code = RDW) 17.5         11.5-14.5                 



[facility] CenterCcnzjqgETMYNTJFNY5025-49-96 07:07:00





             Test Item    Value        Reference Range Interpretation Comments

 

             Eosinophils # (test code 0.2          See_Comment                [A

utomated message] The



             = Eosinophils #)                                        system whic

h generated



                                                                 this result tra

nsmitted



                                                                 reference range

: <=0.5.



                                                                 The reference r

zhen was



                                                                 not used to int

erpret



                                                                 this result as



                                                                 normal/abnormal

.



[facility] CenterDxvczsdLHYIKSCVOV7272-61-79 07:07:00





             Test Item    Value        Reference Range Interpretation Comments

 

             Lymphocytes # (test code = Lymphocytes 2.0          1.0-5.5        

           



             #)                                                  



[facility] CenterGhedefePVXELBVVOW9782-10-09 07:07:00





             Test Item    Value        Reference Range Interpretation Comments

 

             Monocytes # (test code 0.7          See_Comment                [Aut

omated message] The



             = Monocytes #)                                        system which 

generated



                                                                 this result tra

nsmitted



                                                                 reference range

: <=0.8.



                                                                 The reference r

zhen was



                                                                 not used to int

erpret



                                                                 this result as



                                                                 normal/abnormal

.



[facility] CenterSewpmgbQOJNRVIRRK8857-63-74 07:07:00





             Test Item    Value        Reference Range Interpretation Comments

 

             Eosinophils (test code = 2.4          See_Comment                [A

utomated message] The



             Eosinophils)                                        system which ge

nerated



                                                                 this result tra

nsmitted



                                                                 reference range

: <=4.0.



                                                                 The reference r

zhen was



                                                                 not used to int

erpret



                                                                 this result as



                                                                 normal/abnormal

.



[facility] CenterOthqzahWUUVQABGRW9283-44-92 07:07:00





             Test Item    Value        Reference Range Interpretation Comments

 

             Basophils (test code = 0.6          See_Comment                [Aut

omated message] The



             Basophils)                                          system which ge

nerated



                                                                 this result tra

nsmitted



                                                                 reference range

: <=1.0.



                                                                 The reference r

zhen was



                                                                 not used to int

erpret



                                                                 this result as



                                                                 normal/abnormal

.



[facility] CenterZgxewdtFEIKELWOXI1466-38-18 07:07:00





             Test Item    Value        Reference Range Interpretation Comments

 

             Segs-Bands # (test code = Segs-Bands #) 4.0          1.5-8.1       

            



[facility] CenterJtayoqtOSNCNMERUE9494-14-09 07:07:00





             Test Item    Value        Reference Range Interpretation Comments

 

             Monocytes (test code = Monocytes) 10.4         2.0-12.0            

      



[facility] CenterIambmpoXQHMGSYROR4648-86-29 07:07:00





             Test Item    Value        Reference Range Interpretation Comments

 

             RBC Morph (test code = Normal (18 2:07 AM)                     

      



             RBC Morph)                                          



[facility] CenterIygydolIDAFHGWBKI8115-25-64 07:07:00





             Test Item    Value        Reference Range Interpretation Comments

 

             Segs (test code = Segs) 58.1         45.0-75.0                 



[facility] CenterDgtrvsgQHVTEXEWHP3982-69-10 07:07:00





             Test Item    Value        Reference Range Interpretation Comments

 

             Plt Morph (test code = Normal (18 2:07 AM)                     

      



             Plt Morph)                                          



[facility] CenterDplcdapSYTVLVPXGU6984-79-12 07:07:00





             Test Item    Value        Reference Range Interpretation Comments

 

             Lymphocytes (test code = Lymphocytes) 28.5         20.0-40.0       

          



[facility]2018-05-06 07:07:00





             Test Item    Value        Reference Range Interpretation Comments

 

             Glucose Lvl (test code = Glucose Lvl) 74           70-99           

          



[facility]2018-05-06 07:07:00





             Test Item    Value        Reference Range Interpretation Comments

 

             BUN (test code = BUN) 12           7-22                      



[facility]2018-05-06 07:07:00





             Test Item    Value        Reference Range Interpretation Comments

 

             Creatinine Lvl (test code = Creatinine 0.75         0.50-1.40      

           



             Lvl)                                                



[facility]2018-05-06 07:07:00





             Test Item    Value        Reference Range Interpretation Comments

 

             eGFR (test code = eGFR) 140                                    



[facility]2018-05-06 07:07:00





             Test Item    Value        Reference Range Interpretation Comments

 

             Albumin Lvl (test code = Albumin Lvl) 2.9          3.5-5.0         

          



[place] 445548-05-06 07:07:00





             Test Item    Value        Reference Range Interpretation Comments

 

             Globulin (test code = Globulin) 4.4          2.7-4.2               

    



[place] 445548-05-06 07:07:00





             Test Item    Value        Reference Range Interpretation Comments

 

             A/G Ratio (test code = A/G Ratio) 0.7 1        0.7-1.6             

      



[place] [address]-05-06 07:07:00





             Test Item    Value        Reference Range Interpretation Comments

 

             Bili Total (test code = Bili Total) 0.4          0.2-1.3           

        



[place] 445548-05-06 07:07:00





             Test Item    Value        Reference Range Interpretation Comments

 

             Alk Phos (test code = Alk Phos) 71                           

    



[place] 445548-05-06 07:07:00





             Test Item    Value        Reference Range Interpretation Comments

 

             AST (test code = AST) 15           See_Comment                [Auto

mated message] The



                                                                 system which ge

nerated this



                                                                 result transmit

huma



                                                                 reference range

: <=37. The



                                                                 reference range

 was not



                                                                 used to interpr

et this



                                                                 result as zayda

l/abnormal.



[facility]2018-05-06 07:07:00





             Test Item    Value        Reference Range Interpretation Comments

 

             ALT (test code = ALT) 28           See_Comment                [Auto

mated message] The



                                                                 system which ge

nerated this



                                                                 result transmit

huma



                                                                 reference range

: <=65. The



                                                                 reference range

 was not



                                                                 used to interpr

et this



                                                                 result as zayda

l/abnormal.



[place] 445548-05-06 07:07:00





             Test Item    Value        Reference Range Interpretation Comments

 

             Potassium Lvl (test code = Potassium 4.4          3.5-5.1          

         



             Lvl)                                                



[place] 445548-05-06 07:07:00





             Test Item    Value        Reference Range Interpretation Comments

 

             Sodium Lvl (test code = Sodium Lvl) 147          135-145           

        



[place] 445548-05-06 07:07:00





             Test Item    Value        Reference Range Interpretation Comments

 

             CO2 (test code = CO2) 25           24-32                     



[facility]2018-05-06 07:07:00





             Test Item    Value        Reference Range Interpretation Comments

 

             Chloride Lvl (test code = Chloride Lvl) 114                  

            



[place] 445548-05-06 07:07:00





             Test Item    Value        Reference Range Interpretation Comments

 

             B/C Ratio (test code = B/C Ratio) 16 1         6-25                

      



[place] 445548-05-06 07:07:00





             Test Item    Value        Reference Range Interpretation Comments

 

             Calcium Lvl (test code = Calcium Lvl) 8.7          8.5-10.5        

          



[facility]2018-05-06 07:07:00





             Test Item    Value        Reference Range Interpretation Comments

 

             AGAP (test code = AGAP) 12.4         10.0-20.0                 



[facility]2018-05-06 07:07:00





             Test Item    Value        Reference Range Interpretation Comments

 

             Total Protein (test code = Total 7.3          6.4-8.4              

     



             Protein)                                            



[facility] CenterDvfilweAFRACWDLKY6188-64-46 07:07:00





             Test Item    Value        Reference Range Interpretation Comments

 

             Platelet (test code = Platelet) 345          133-450               

    



[facility] CenterZifpjtcJQRBSOSRAX7858-43-23 07:07:00





             Test Item    Value        Reference Range Interpretation Comments

 

             MPV (test code = MPV) 8.7          7.4-10.4                  



[facility] CenterPemlpjmRKRITWLGAV8173-85-20 07:07:00





             Test Item    Value        Reference Range Interpretation Comments

 

             WBC (test code = WBC) 6.9          3.7-10.4                  



[facility] CenterIpanjhdSRUIESSRJJ6877-63-68 07:07:00





             Test Item    Value        Reference Range Interpretation Comments

 

             RBC (test code = RBC) 5.30         4.70-6.10                 



[place] 512098-05-06 07:07:00





             Test Item    Value        Reference Range Interpretation Comments

 

             MCHC (test code = MCHC) 32.8         32.0-36.0                 



[facility] CenterUbjczoaYWZLOWHOIO5278-36-90 07:07:00





             Test Item    Value        Reference Range Interpretation Comments

 

             MCH (test code = MCH) 27.9 pg      27.0-31.0                 



[place] 512098-05-06 07:07:00





             Test Item    Value        Reference Range Interpretation Comments

 

             Hgb (test code = Hgb) 14.8         14.0-18.0                 



[facility] CenterJsqyjzlQJBDPTCITF2070-97-70 07:07:00





             Test Item    Value        Reference Range Interpretation Comments

 

             MCV (test code = MCV) 85.0         80.0-94.0                 



[facility] CenterZhowyuvWEHLOIWSKT1332-33-64 07:07:00





             Test Item    Value        Reference Range Interpretation Comments

 

             Hct (test code = Hct) 45.1         42.0-54.0                 



[facility] CenterPferznfXOPTMTOETV5755-06-98 07:07:00





             Test Item    Value        Reference Range Interpretation Comments

 

             RDW (test code = RDW) 17.5         11.5-14.5                 



[facility] CenterXljkijrHRIODNLPQP6746-23-29 07:07:00





             Test Item    Value        Reference Range Interpretation Comments

 

             Eosinophils # (test code 0.2          See_Comment                [A

utomated message] The



             = Eosinophils #)                                        system whic

h generated



                                                                 this result tra

nsmitted



                                                                 reference range

: <=0.5.



                                                                 The reference r

zhen was



                                                                 not used to int

erpret



                                                                 this result as



                                                                 normal/abnormal

.



[facility] CenterDsklgvjYWUGVHFSKU2023-76-49 07:07:00





             Test Item    Value        Reference Range Interpretation Comments

 

             Lymphocytes # (test code = Lymphocytes 2.0          1.0-5.5        

           



             #)                                                  



[facility] CenterPwknbtnJUOYGZJDNH6965-04-06 07:07:00





             Test Item    Value        Reference Range Interpretation Comments

 

             Monocytes # (test code 0.7          See_Comment                [Aut

omated message] The



             = Monocytes #)                                        system which 

generated



                                                                 this result tra

nsmitted



                                                                 reference range

: <=0.8.



                                                                 The reference r

zhen was



                                                                 not used to int

erpret



                                                                 this result as



                                                                 normal/abnormal

.



[facility] CenterLtsyzyaJNWABAAUOA4693-33-20 07:07:00





             Test Item    Value        Reference Range Interpretation Comments

 

             Eosinophils (test code = 2.4          See_Comment                [A

utomated message] The



             Eosinophils)                                        system which ge

nerated



                                                                 this result tra

nsmitted



                                                                 reference range

: <=4.0.



                                                                 The reference r

zhen was



                                                                 not used to int

erpret



                                                                 this result as



                                                                 normal/abnormal

.



[facility] CenterLzvbaqgIGRIBIKRJI8584-90-88 07:07:00





             Test Item    Value        Reference Range Interpretation Comments

 

             Basophils (test code = 0.6          See_Comment                [Aut

omated message] The



             Basophils)                                          system which ge

nerated



                                                                 this result tra

nsmitted



                                                                 reference range

: <=1.0.



                                                                 The reference r

zhen was



                                                                 not used to int

erpret



                                                                 this result as



                                                                 normal/abnormal

.



[facility] CenterHhitxxgVQCDXUDDAU5124-33-28 07:07:00





             Test Item    Value        Reference Range Interpretation Comments

 

             Segs-Bands # (test code = Segs-Bands #) 4.0          1.5-8.1       

            



[facility] CenterBqqiwabONTZTCJYCB4763-16-43 07:07:00





             Test Item    Value        Reference Range Interpretation Comments

 

             Monocytes (test code = Monocytes) 10.4         2.0-12.0            

      



[facility] CenterTyvfsxeVURESKNMAU5385-35-86 07:07:00





             Test Item    Value        Reference Range Interpretation Comments

 

             RBC Morph (test code = Normal (18 2:07 AM)                     

      



             RBC Morph)                                          



[facility] CenterJgeuwfdIGKYXLCSOK8535-51-49 07:07:00





             Test Item    Value        Reference Range Interpretation Comments

 

             Segs (test code = Segs) 58.1         45.0-75.0                 



[place] 512098-05-06 07:07:00





             Test Item    Value        Reference Range Interpretation Comments

 

             Plt Morph (test code = Normal (18 2:07 AM)                     

      



             Plt Morph)                                          



[facility] CenterJpvosxqCGFVXEONOB5932-56-06 07:07:00





             Test Item    Value        Reference Range Interpretation Comments

 

             Lymphocytes (test code = Lymphocytes) 28.5         20.0-40.0       

          



[facility]2018-05-06 07:07:00





             Test Item    Value        Reference Range Interpretation Comments

 

             Glucose Lvl (test code = Glucose Lvl) 74           70-99           

          



[facility]2018-05-06 07:07:00





             Test Item    Value        Reference Range Interpretation Comments

 

             BUN (test code = BUN) 12           7-22                      



[facility]2018-05-06 07:07:00





             Test Item    Value        Reference Range Interpretation Comments

 

             Creatinine Lvl (test code = Creatinine 0.75         0.50-1.40      

           



             Lvl)                                                



[facility]2018-05-06 07:07:00





             Test Item    Value        Reference Range Interpretation Comments

 

             eGFR (test code = eGFR) 140                                    



[facility]2018-05-06 07:07:00





             Test Item    Value        Reference Range Interpretation Comments

 

             Albumin Lvl (test code = Albumin Lvl) 2.9          3.5-5.0         

          



[facility]2018-05-06 07:07:00





             Test Item    Value        Reference Range Interpretation Comments

 

             Globulin (test code = Globulin) 4.4          2.7-4.2               

    



[place] 445548-05-06 07:07:00





             Test Item    Value        Reference Range Interpretation Comments

 

             A/G Ratio (test code = A/G Ratio) 0.7 1        0.7-1.6             

      



[place] 445548-05-06 07:07:00





             Test Item    Value        Reference Range Interpretation Comments

 

             Bili Total (test code = Bili Total) 0.4          0.2-1.3           

        



[place] 445548-05-06 07:07:00





             Test Item    Value        Reference Range Interpretation Comments

 

             Alk Phos (test code = Alk Phos) 71                           

    



[place] [address]-05-06 07:07:00





             Test Item    Value        Reference Range Interpretation Comments

 

             AST (test code = AST) 15           See_Comment                [Auto

mated message] The



                                                                 system which ge

nerated this



                                                                 result transmit

huma



                                                                 reference range

: <=37. The



                                                                 reference range

 was not



                                                                 used to interpr

et this



                                                                 result as zayda

l/abnormal.



[place] [address]-05-06 07:07:00





             Test Item    Value        Reference Range Interpretation Comments

 

             ALT (test code = ALT) 28           See_Comment                [Auto

mated message] The



                                                                 system which ge

nerated this



                                                                 result transmit

huma



                                                                 reference range

: <=65. The



                                                                 reference range

 was not



                                                                 used to interpr

et this



                                                                 result as zayda

l/abnormal.



[place] 445548-05-06 07:07:00





             Test Item    Value        Reference Range Interpretation Comments

 

             Potassium Lvl (test code = Potassium 4.4          3.5-5.1          

         



             Lvl)                                                



[place] 445548-05-06 07:07:00





             Test Item    Value        Reference Range Interpretation Comments

 

             Sodium Lvl (test code = Sodium Lvl) 147          135-145           

        



[place] 445548-05-06 07:07:00





             Test Item    Value        Reference Range Interpretation Comments

 

             CO2 (test code = CO2) 25           24-32                     



[place] 445548-05-06 07:07:00





             Test Item    Value        Reference Range Interpretation Comments

 

             Chloride Lvl (test code = Chloride Lvl) 114                  

            



[place] 445548-05-06 07:07:00





             Test Item    Value        Reference Range Interpretation Comments

 

             B/C Ratio (test code = B/C Ratio) 16 1         6-25                

      



[place] 445548-05-06 07:07:00





             Test Item    Value        Reference Range Interpretation Comments

 

             Calcium Lvl (test code = Calcium Lvl) 8.7          8.5-10.5        

          



[facility]2018-05-06 07:07:00





             Test Item    Value        Reference Range Interpretation Comments

 

             AGAP (test code = AGAP) 12.4         10.0-20.0                 



[facility]2018-05-06 07:07:00





             Test Item    Value        Reference Range Interpretation Comments

 

             Total Protein (test code = Total 7.3          6.4-8.4              

     



             Protein)                                            



[facility] CenterManlmeyYUUAOTSHOU3763-34-27 07:07:00





             Test Item    Value        Reference Range Interpretation Comments

 

             Platelet (test code = Platelet) 345          133-450               

    



[facility] CenterEfknjfkAOVTQGRKBY1120-17-90 07:07:00





             Test Item    Value        Reference Range Interpretation Comments

 

             MPV (test code = MPV) 8.7          7.4-10.4                  



[facility] CenterDvaifggIWXHLJHELN0759-64-40 07:07:00





             Test Item    Value        Reference Range Interpretation Comments

 

             WBC (test code = WBC) 6.9          3.7-10.4                  



[facility] CenterRvnctruLCZJQEAMYB4929-41-72 07:07:00





             Test Item    Value        Reference Range Interpretation Comments

 

             RBC (test code = RBC) 5.30         4.70-6.10                 



[facility] CenterLjmevxmEIYETOQQWJ8993-59-50 07:07:00





             Test Item    Value        Reference Range Interpretation Comments

 

             MCHC (test code = MCHC) 32.8         32.0-36.0                 



[facility] CenterTrafquoBSKCSEOELU8849-95-84 07:07:00





             Test Item    Value        Reference Range Interpretation Comments

 

             MCH (test code = MCH) 27.9 pg      27.0-31.0                 



[facility] CenterIggbptwNGFACUMBBZ0400-81-03 07:07:00





             Test Item    Value        Reference Range Interpretation Comments

 

             Hgb (test code = Hgb) 14.8         14.0-18.0                 



[facility] CenterRqrnvmsQFZOGYAEIM5585-44-11 07:07:00





             Test Item    Value        Reference Range Interpretation Comments

 

             MCV (test code = MCV) 85.0         80.0-94.0                 



[facility] CenterQxjimfzGOAIDDUZOK1368-43-17 07:07:00





             Test Item    Value        Reference Range Interpretation Comments

 

             Hct (test code = Hct) 45.1         42.0-54.0                 



[place] 512098-05-06 07:07:00





             Test Item    Value        Reference Range Interpretation Comments

 

             RDW (test code = RDW) 17.5         11.5-14.5                 



[facility] CenterWcjukmqOAQICJSMRK4464-47-73 07:07:00





             Test Item    Value        Reference Range Interpretation Comments

 

             Eosinophils # (test code 0.2          See_Comment                [A

utomated message] The



             = Eosinophils #)                                        system whic

h generated



                                                                 this result tra

nsmitted



                                                                 reference range

: <=0.5.



                                                                 The reference r

zhen was



                                                                 not used to int

erpret



                                                                 this result as



                                                                 normal/abnormal

.



[facility] CenterKvcofzxSCOJAHIEFT3508-24-44 07:07:00





             Test Item    Value        Reference Range Interpretation Comments

 

             Lymphocytes # (test code = Lymphocytes 2.0          1.0-5.5        

           



             #)                                                  



[facility] CenterVfatbdkEROGPUZRIZ0309-30-70 07:07:00





             Test Item    Value        Reference Range Interpretation Comments

 

             Monocytes # (test code 0.7          See_Comment                [Aut

omated message] The



             = Monocytes #)                                        system which 

generated



                                                                 this result tra

nsmitted



                                                                 reference range

: <=0.8.



                                                                 The reference r

zhen was



                                                                 not used to int

erpret



                                                                 this result as



                                                                 normal/abnormal

.



[facility] CenterTubtrnfFHIBUVSBHJ9925-15-13 07:07:00





             Test Item    Value        Reference Range Interpretation Comments

 

             Eosinophils (test code = 2.4          See_Comment                [A

utomated message] The



             Eosinophils)                                        system which ge

nerated



                                                                 this result tra

nsmitted



                                                                 reference range

: <=4.0.



                                                                 The reference r

zhen was



                                                                 not used to int

erpret



                                                                 this result as



                                                                 normal/abnormal

.



[facility] CenterXnrwyumOBXJRQIUGC5426-11-11 07:07:00





             Test Item    Value        Reference Range Interpretation Comments

 

             Basophils (test code = 0.6          See_Comment                [Aut

omated message] The



             Basophils)                                          system which ge

nerated



                                                                 this result tra

nsmitted



                                                                 reference range

: <=1.0.



                                                                 The reference r

zhne was



                                                                 not used to int

erpret



                                                                 this result as



                                                                 normal/abnormal

.



[facility] CenterPdviywwDAECEGFGIJ2121-25-24 07:07:00





             Test Item    Value        Reference Range Interpretation Comments

 

             Segs-Bands # (test code = Segs-Bands #) 4.0          1.5-8.1       

            



[facility] CenterAgsqknyTAYXOXUUAO0110-55-91 07:07:00





             Test Item    Value        Reference Range Interpretation Comments

 

             Monocytes (test code = Monocytes) 10.4         2.0-12.0            

      



[facility] CenterUphfznqYDZBAEZARF2138-66-54 07:07:00





             Test Item    Value        Reference Range Interpretation Comments

 

             RBC Morph (test code = Normal (18 2:07 AM)                     

      



             RBC Morph)                                          



[facility] CenterQlhhrczUOFSHULSEB7053-51-21 07:07:00





             Test Item    Value        Reference Range Interpretation Comments

 

             Segs (test code = Segs) 58.1         45.0-75.0                 



[facility] CenterLdurenvBTKZYLDTJE9513-10-55 07:07:00





             Test Item    Value        Reference Range Interpretation Comments

 

             Plt Morph (test code = Normal (18 2:07 AM)                     

      



             Plt Morph)                                          



[facility] CenterQjvdhchIZRRJTKSQU4547-49-85 07:07:00





             Test Item    Value        Reference Range Interpretation Comments

 

             Lymphocytes (test code = Lymphocytes) 28.5         20.0-40.0       

          



[facility]2018-05-06 07:07:00





             Test Item    Value        Reference Range Interpretation Comments

 

             Glucose Lvl (test code = Glucose Lvl) 74           70-99           

          



[facility]2018-05-06 07:07:00





             Test Item    Value        Reference Range Interpretation Comments

 

             BUN (test code = BUN) 12           7-22                      



[facility]2018-05-06 07:07:00





             Test Item    Value        Reference Range Interpretation Comments

 

             Creatinine Lvl (test code = Creatinine 0.75         0.50-1.40      

           



             Lvl)                                                



[facility]2018-05-06 07:07:00





             Test Item    Value        Reference Range Interpretation Comments

 

             eGFR (test code = eGFR) 140                                    



[facility]2018-05-06 07:07:00





             Test Item    Value        Reference Range Interpretation Comments

 

             Albumin Lvl (test code = Albumin Lvl) 2.9          3.5-5.0         

          



[facility]2018-05-06 07:07:00





             Test Item    Value        Reference Range Interpretation Comments

 

             Globulin (test code = Globulin) 4.4          2.7-4.2               

    



[facility]2018-05-06 07:07:00





             Test Item    Value        Reference Range Interpretation Comments

 

             A/G Ratio (test code = A/G Ratio) 0.7 1        0.7-1.6             

      



[facility]2018-05-06 07:07:00





             Test Item    Value        Reference Range Interpretation Comments

 

             Bili Total (test code = Bili Total) 0.4          0.2-1.3           

        



[facility]2018-05-06 07:07:00





             Test Item    Value        Reference Range Interpretation Comments

 

             Alk Phos (test code = Alk Phos) 71                           

    



[facility]2018-05-06 07:07:00





             Test Item    Value        Reference Range Interpretation Comments

 

             AST (test code = AST) 15           See_Comment                [Auto

mated message] The



                                                                 system which ge

nerated this



                                                                 result transmit

huma



                                                                 reference range

: <=37. The



                                                                 reference range

 was not



                                                                 used to interpr

et this



                                                                 result as zayda

l/abnormal.



[place]45548-05-06 07:07:00





             Test Item    Value        Reference Range Interpretation Comments

 

             ALT (test code = ALT) 28           See_Comment                [Auto

mated message] The



                                                                 system which ge

nerated this



                                                                 result transmit

huma



                                                                 reference range

: <=65. The



                                                                 reference range

 was not



                                                                 used to interpr

et this



                                                                 result as zayda

l/abnormal.



[facility]2018-05-06 07:07:00





             Test Item    Value        Reference Range Interpretation Comments

 

             Potassium Lvl (test code = Potassium 4.4          3.5-5.1          

         



             Lvl)                                                



[facility]2018-05-06 07:07:00





             Test Item    Value        Reference Range Interpretation Comments

 

             Sodium Lvl (test code = Sodium Lvl) 147          135-145           

        



[facility]2018-05-06 07:07:00





             Test Item    Value        Reference Range Interpretation Comments

 

             CO2 (test code = CO2) 25           24-32                     



[facility]2018-05-06 07:07:00





             Test Item    Value        Reference Range Interpretation Comments

 

             Chloride Lvl (test code = Chloride Lvl) 114                  

            



[facility]2018-05-06 07:07:00





             Test Item    Value        Reference Range Interpretation Comments

 

             B/C Ratio (test code = B/C Ratio) 16 1         6-25                

      



[facility]2018-05-06 07:07:00





             Test Item    Value        Reference Range Interpretation Comments

 

             Calcium Lvl (test code = Calcium Lvl) 8.7          8.5-10.5        

          



[facility]2018-05-06 07:07:00





             Test Item    Value        Reference Range Interpretation Comments

 

             AGAP (test code = AGAP) 12.4         10.0-20.0                 



[facility]2018-05-06 07:07:00





             Test Item    Value        Reference Range Interpretation Comments

 

             Total Protein (test code = Total 7.3          6.4-8.4              

     



             Protein)                                            



[facility] CenterRlunikrKBOQEWGPMY7815-06-84 07:07:00





             Test Item    Value        Reference Range Interpretation Comments

 

             Platelet (test code = Platelet) 345          133-450               

    



[facility] CenterDvenebeWAGYTAINBW9381-94-67 07:07:00





             Test Item    Value        Reference Range Interpretation Comments

 

             MPV (test code = MPV) 8.7          7.4-10.4                  



[facility] CenterJgsqsxfJNRFOBJGTR3028-09-93 07:07:00





             Test Item    Value        Reference Range Interpretation Comments

 

             WBC (test code = WBC) 6.9          3.7-10.4                  



[facility] CenterBllrhryXUXHIYVXZS3572-33-36 07:07:00





             Test Item    Value        Reference Range Interpretation Comments

 

             RBC (test code = RBC) 5.30         4.70-6.10                 



[facility] CenterJcextaaYBTIDTNXSX5891-93-67 07:07:00





             Test Item    Value        Reference Range Interpretation Comments

 

             MCHC (test code = MCHC) 32.8         32.0-36.0                 



[facility] CenterKbfvwgmFIHOTFIKXF8650-68-70 07:07:00





             Test Item    Value        Reference Range Interpretation Comments

 

             MCH (test code = MCH) 27.9 pg      27.0-31.0                 



[facility] CenterChhjjhySRXTMRUPOU6409-42-30 07:07:00





             Test Item    Value        Reference Range Interpretation Comments

 

             Hgb (test code = Hgb) 14.8         14.0-18.0                 



[facility] CenterQtipgcuFEJWAFYJFB5863-33-23 07:07:00





             Test Item    Value        Reference Range Interpretation Comments

 

             MCV (test code = MCV) 85.0         80.0-94.0                 



[facility] CenterYdkrszlYAEMYZVKUN9468-10-19 07:07:00





             Test Item    Value        Reference Range Interpretation Comments

 

             Hct (test code = Hct) 45.1         42.0-54.0                 



[facility] CenterZvafvxcRIZZWQTTRD5015-12-08 07:07:00





             Test Item    Value        Reference Range Interpretation Comments

 

             RDW (test code = RDW) 17.5         11.5-14.5                 



[facility] CenterUkkkcegAJWNUCPTBQ7625-41-92 07:07:00





             Test Item    Value        Reference Range Interpretation Comments

 

             Eosinophils # (test code 0.2          See_Comment                [A

utomated message] The



             = Eosinophils #)                                        system whic

h generated



                                                                 this result tra

nsmitted



                                                                 reference range

: <=0.5.



                                                                 The reference r

zhen was



                                                                 not used to int

erpret



                                                                 this result as



                                                                 normal/abnormal

.



[facility] CenterGhnruupXLEJOAEPCM7366-87-07 07:07:00





             Test Item    Value        Reference Range Interpretation Comments

 

             Lymphocytes # (test code = Lymphocytes 2.0          1.0-5.5        

           



             #)                                                  



[facility] CenterTkgpqnaOLINCYSSUJ6137-54-18 07:07:00





             Test Item    Value        Reference Range Interpretation Comments

 

             Monocytes # (test code 0.7          See_Comment                [Aut

omated message] The



             = Monocytes #)                                        system which 

generated



                                                                 this result tra

nsmitted



                                                                 reference range

: <=0.8.



                                                                 The reference r

zhen was



                                                                 not used to int

erpret



                                                                 this result as



                                                                 normal/abnormal

.



[facility] CenterExdgwkwLLCDMTMDIX6372-24-39 07:07:00





             Test Item    Value        Reference Range Interpretation Comments

 

             Eosinophils (test code = 2.4          See_Comment                [A

utomated message] The



             Eosinophils)                                        system which ge

nerated



                                                                 this result tra

nsmitted



                                                                 reference range

: <=4.0.



                                                                 The reference r

zhen was



                                                                 not used to int

erpret



                                                                 this result as



                                                                 normal/abnormal

.



[facility] CenterTszhhddLQZSMQEDAE5277-59-67 07:07:00





             Test Item    Value        Reference Range Interpretation Comments

 

             Basophils (test code = 0.6          See_Comment                [Aut

omated message] The



             Basophils)                                          system which ge

nerated



                                                                 this result tra

nsmitted



                                                                 reference range

: <=1.0.



                                                                 The reference r

zhen was



                                                                 not used to int

erpret



                                                                 this result as



                                                                 normal/abnormal

.



[facility] CenterQmnutroKXIVEZZALZ4088-37-07 07:07:00





             Test Item    Value        Reference Range Interpretation Comments

 

             Segs-Bands # (test code = Segs-Bands #) 4.0          1.5-8.1       

            



[facility] CenterCugfvpfNRLWTTNFLF0761-40-04 07:07:00





             Test Item    Value        Reference Range Interpretation Comments

 

             Monocytes (test code = Monocytes) 10.4         2.0-12.0            

      



[facility] CenterKgurgkhIUJZCMXDVV8684-84-39 07:07:00





             Test Item    Value        Reference Range Interpretation Comments

 

             RBC Morph (test code = Normal (18 2:07 AM)                     

      



             RBC Morph)                                          



[facility] CenterUppxfloKFYSGPXGQN8154-53-47 07:07:00





             Test Item    Value        Reference Range Interpretation Comments

 

             Segs (test code = Segs) 58.1         45.0-75.0                 



[facility] CenterFnfosmwEYAQHXAHIG1211-84-12 07:07:00





             Test Item    Value        Reference Range Interpretation Comments

 

             Plt Morph (test code = Normal (18 2:07 AM)                     

      



             Plt Morph)                                          



[facility] CenterIdjojdeMALPBBCJML4255-07-04 07:07:00





             Test Item    Value        Reference Range Interpretation Comments

 

             Lymphocytes (test code = Lymphocytes) 28.5         20.0-40.0       

          



[facility] CenterPfzyfyeJKAXKIXRBE3596-01-80 16:07:00





             Test Item    Value        Reference Range Interpretation Comments

 

             Basophils # (test code 0.1          See_Comment                [Aut

omated message] The



             = Basophils #)                                        system which 

generated



                                                                 this result tra

nsmitted



                                                                 reference range

: <=0.2.



                                                                 The reference r

zhen was



                                                                 not used to int

erpret



                                                                 this result as



                                                                 normal/abnormal

.



[facility] CenterQndtfriBUZQFCFFNH4745-82-12 16:07:00





             Test Item    Value        Reference Range Interpretation Comments

 

             Polychrom (test code = Moderate *ABN*(18                       

    



             Polychrom)   11:07 AM)                              



[facility] CenterOahpfroTBGSUWUCNN8462-61-71 16:07:00





             Test Item    Value        Reference Range Interpretation Comments

 

             Basophils # (test code 0.1          See_Comment                [Aut

omated message] The



             = Basophils #)                                        system which 

generated



                                                                 this result tra

nsmitted



                                                                 reference range

: <=0.2.



                                                                 The reference r

zhen was



                                                                 not used to int

erpret



                                                                 this result as



                                                                 normal/abnormal

.



[facility] CenterGnmsudaURHBURQTAX6911-31-60 16:07:00





             Test Item    Value        Reference Range Interpretation Comments

 

             Polychrom (test code = Moderate *ABN*(18                       

    



             Polychrom)   11:07 AM)                              



[facility] CenterVasdzsaDGDEQBMGZQ0739-90-92 16:07:00





             Test Item    Value        Reference Range Interpretation Comments

 

             Basophils # (test code 0.1          See_Comment                [Aut

omated message] The



             = Basophils #)                                        system which 

generated



                                                                 this result tra

nsmitted



                                                                 reference range

: <=0.2.



                                                                 The reference r

zhen was



                                                                 not used to int

erpret



                                                                 this result as



                                                                 normal/abnormal

.



[facility] CenterVhnssxlGDTIPPEVFU2216-85-89 16:07:00





             Test Item    Value        Reference Range Interpretation Comments

 

             Polychrom (test code = Moderate *ABN*(18                       

    



             Polychrom)   11:07 AM)                              



[facility] CenterNkwqncyQXRBTARGID2161-41-92 16:07:00





             Test Item    Value        Reference Range Interpretation Comments

 

             Basophils # (test code 0.1          See_Comment                [Aut

omated message] The



             = Basophils #)                                        system which 

generated



                                                                 this result tra

nsmitted



                                                                 reference range

: <=0.2.



                                                                 The reference r

zhen was



                                                                 not used to int

erpret



                                                                 this result as



                                                                 normal/abnormal

.



[facility] CenterTagjdcnSUBVHBVDHO6022-63-75 16:07:00





             Test Item    Value        Reference Range Interpretation Comments

 

             Polychrom (test code = Moderate *ABN*(18                       

    



             Polychrom)   11:07 AM)                              



[facility] NgozdtzSXQYDMARYF3637-27-72 06:43:00





             Test Item    Value        Reference Range Interpretation Comments

 

             Vanco Tr TND (test code = Vanco Tr TND) *                          

            



[facility] BkaepnmJTZSYXYZUN6806-11-33 06:43:00





             Test Item    Value        Reference Range Interpretation Comments

 

             Vanco Tr (test code = Vanco Tr) 22.3                               

    



[facility] SqbjfosYJBEOFEOTY3786-73-80 06:43:00





             Test Item    Value        Reference Range Interpretation Comments

 

             Vanco Tr TND (test code = Vanco Tr TND) *                          

            



[facility] VnppthuHGECQMPWLO2953-22-26 06:43:00





             Test Item    Value        Reference Range Interpretation Comments

 

             Vanco Tr (test code = Vanco Tr) 22.3                               

    



[facility] WpxyeexICUFHBWLZU4827-28-10 06:43:00





             Test Item    Value        Reference Range Interpretation Comments

 

             Vanco Tr TND (test code = Vanco Tr TND) *                          

            



[facility] FoodsfuAQOIYYYDKT0674-37-47 06:43:00





             Test Item    Value        Reference Range Interpretation Comments

 

             Vanco Tr (test code = Vanco Tr) 22.3                               

    



[facility] PxkjgocZQCZJYPKCW5613-59-57 06:43:00





             Test Item    Value        Reference Range Interpretation Comments

 

             Vanco Tr TND (test code = Vanco Tr TND) *                          

            



[facility] DflzdwrXBONOJBKZF1713-79-32 06:43:00





             Test Item    Value        Reference Range Interpretation Comments

 

             Vanco Tr (test code = Vanco Tr) 22.3                               

    



[facility]annCHE[facility] NZOSY1113-28-[facility] 11:45:00





             Test Item    Value        Reference Range Interpretation Comments

 

             Magnesium Lvl (test code = Magnesium 2.3          1.8-2.4          

         



             Lvl)                                                



[facility] CenterannCHEM YGPYT8954-83-18 11:45:00





             Test Item    Value        Reference Range Interpretation Comments

 

             Phosphorus (test code = Phosphorus) 3.5          2.5-4.5           

        



[facility] CenterXoycqkpKWZBHOBZKT3579-59-04 11:45:00





             Test Item    Value        Reference Range Interpretation Comments

 

             PT (test code = PT) 14.0 s       12.0-14.7                 



[facility] CenterBaghjmdRMUUPDDIEC6570-68-80 11:45:00





             Test Item    Value        Reference Range Interpretation Comments

 

             PTT (test code = PTT) 37.6 s       22.9-35.8                 



[facility] CenterKemczrzEDNPBLYIYE7679-29-46 11:45:00





             Test Item    Value        Reference Range Interpretation Comments

 

             INR (test code = INR) 1.08 1       0.85-1.17                 



[facility]enThycpzvFELYNAROLU4554-60-31 11:45:00





             Test Item    Value        Reference Range Interpretation Comments

 

             C-REACTIVE PROTEIN (test code = 13.1                               

    



             C-REACTIVE PROTEIN)                                        



[facility]enBcbnrvdXGZHRJAGON0980-64-62 11:45:00





             Test Item    Value        Reference Range Interpretation Comments

 

             Prealbumin (test code = Prealbumin) 25.2         18.0-45.0         

        



[facility]2018-05-04 11:45:00





             Test Item    Value        Reference Range Interpretation Comments

 

             Magnesium Lvl (test code = Magnesium 2.3          1.8-2.4          

         



             Lvl)                                                



[facility]2018-05-04 11:45:00





             Test Item    Value        Reference Range Interpretation Comments

 

             Phosphorus (test code = Phosphorus) 3.5          2.5-4.5           

        



[facility] CenterBdiugpgOTWQGQAHOZ7930-23-43 11:45:00





             Test Item    Value        Reference Range Interpretation Comments

 

             PT (test code = PT) 14.0 s       12.0-14.7                 



[facility] CenterRmmuontTSAXRYOOIG8752-45-20 11:45:00





             Test Item    Value        Reference Range Interpretation Comments

 

             PTT (test code = PTT) 37.6 s       22.9-35.8                 



[facility] CenterXkngymxGSYPXLZDKM4025-30-46 11:45:00





             Test Item    Value        Reference Range Interpretation Comments

 

             INR (test code = INR) 1.08 1       0.85-1.17                 



[facility]enDkcnokoJOOHYHAPPE8060-29-66 11:45:00





             Test Item    Value        Reference Range Interpretation Comments

 

             C-REACTIVE PROTEIN (test code = 13.1                               

    



             C-REACTIVE PROTEIN)                                        



[facility]enJjngsdrETJNOWYSCP0336-65-28 11:45:00





             Test Item    Value        Reference Range Interpretation Comments

 

             Prealbumin (test code = Prealbumin) 25.2         18.0-45.0         

        



[facility]2018-05-04 11:45:00





             Test Item    Value        Reference Range Interpretation Comments

 

             Magnesium Lvl (test code = Magnesium 2.3          1.8-2.4          

         



             Lvl)                                                



[facility]2018-05-04 11:45:00





             Test Item    Value        Reference Range Interpretation Comments

 

             Phosphorus (test code = Phosphorus) 3.5          2.5-4.5           

        



[place] [address]-05-04 11:45:00





             Test Item    Value        Reference Range Interpretation Comments

 

             PT (test code = PT) 14.0 s       12.0-14.7                 



[facility] CenterEypmhqiTRNQUZAOXF1588-60-23 11:45:00





             Test Item    Value        Reference Range Interpretation Comments

 

             PTT (test code = PTT) 37.6 s       22.9-35.8                 



[facility] CenterSjsviaqLOKRDUTHIU5538-13-33 11:45:00





             Test Item    Value        Reference Range Interpretation Comments

 

             INR (test code = INR) 1.08 1       0.85-1.17                 



[facility]enYbkdlmxLWIYUPZWLU1752-65-65 11:45:00





             Test Item    Value        Reference Range Interpretation Comments

 

             C-REACTIVE PROTEIN (test code = 13.1                               

    



             C-REACTIVE PROTEIN)                                        



[facility]enGswrymrHIHUZTOGSK2201-34-81 11:45:00





             Test Item    Value        Reference Range Interpretation Comments

 

             Prealbumin (test code = Prealbumin) 25.2         18.0-45.0         

        



[facility]2018-05-04 11:45:00





             Test Item    Value        Reference Range Interpretation Comments

 

             Magnesium Lvl (test code = Magnesium 2.3          1.8-2.4          

         



             Lvl)                                                



[facility]2018-05-04 11:45:00





             Test Item    Value        Reference Range Interpretation Comments

 

             Phosphorus (test code = Phosphorus) 3.5          2.5-4.5           

        



[facility] CenterIezrjyhNNUCNHBXBQ2918-26-25 11:45:00





             Test Item    Value        Reference Range Interpretation Comments

 

             PT (test code = PT) 14.0 s       12.0-14.7                 



[facility] CenterSyymevcGTCTOSWCDH8395-17-25 11:45:00





             Test Item    Value        Reference Range Interpretation Comments

 

             PTT (test code = PTT) 37.6 s       22.9-35.8                 



[facility] CenterLhzblkgHSBYKLGBGK5063-96-27 11:45:00





             Test Item    Value        Reference Range Interpretation Comments

 

             INR (test code = INR) 1.08 1       0.85-1.17                 



[facility]enSadeovhKLOLCOFTCR6575-73-16 11:45:00





             Test Item    Value        Reference Range Interpretation Comments

 

             C-REACTIVE PROTEIN (test code = 13.1                               

    



             C-REACTIVE PROTEIN)                                        



[facility]enRprfkigWHOHUXBLRI3359-76-36 11:45:00





             Test Item    Value        Reference Range Interpretation Comments

 

             Prealbumin (test code = Prealbumin) 25.2         18.0-45.0         

        



[facility]2018-05-04 03:06:00





             Test Item    Value        Reference Range Interpretation Comments

 

             Lactic Acid Lvl (test code = Lactic 0.9          0.5-2.2           

        



             Acid Lvl)                                           



[facility] CenterEozmigrEWGXZTCZDK4889-31-81 03:06:00





             Test Item    Value        Reference Range Interpretation Comments

 

             Sed Rate (test code = 5            See_Comment                [Auto

mated message] The



             Sed Rate)                                           system which ge

nerated this



                                                                 result transmit

huma



                                                                 reference range

: <=15. The



                                                                 reference range

 was not



                                                                 used to interpr

et this



                                                                 result as zayda

l/abnormal.



[facility]enHuycrbcPIQMIVRHRA3677-61-46 03:06:00





             Test Item    Value        Reference Range Interpretation Comments

 

             C-REACTIVE PROTEIN (test code = 15.8                               

    



             C-REACTIVE PROTEIN)                                        



[facility]2018-05-04 03:06:00





             Test Item    Value        Reference Range Interpretation Comments

 

             Lactic Acid Lvl (test code = Lactic 0.9          0.5-2.2           

        



             Acid Lvl)                                           



[facility] CenterEysftigEEOCXUQDVY5625-11-86 03:06:00





             Test Item    Value        Reference Range Interpretation Comments

 

             Sed Rate (test code = 5            See_Comment                [Auto

mated message] The



             Sed Rate)                                           system which ge

nerated this



                                                                 result transmit

huma



                                                                 reference range

: <=15. The



                                                                 reference range

 was not



                                                                 used to interpr

et this



                                                                 result as zayda

l/abnormal.



[facility]enMsrzjtjTVUSCNSUIA1102-25-39 03:06:00





             Test Item    Value        Reference Range Interpretation Comments

 

             C-REACTIVE PROTEIN (test code = 15.8                               

    



             C-REACTIVE PROTEIN)                                        



[facility]2018-05-04 03:06:00





             Test Item    Value        Reference Range Interpretation Comments

 

             Lactic Acid Lvl (test code = Lactic 0.9          0.5-2.2           

        



             Acid Lvl)                                           



[facility] CenterIpfwhjaTCQBORQVYD6920-16-04 03:06:00





             Test Item    Value        Reference Range Interpretation Comments

 

             Sed Rate (test code = 5            See_Comment                [Auto

mated message] The



             Sed Rate)                                           system which ge

nerated this



                                                                 result transmit

huma



                                                                 reference range

: <=15. The



                                                                 reference range

 was not



                                                                 used to interpr

et this



                                                                 result as zayda

l/abnormal.



[facility]lingenLhliglaDZZKENFHRL9525-15-70 03:06:00





             Test Item    Value        Reference Range Interpretation Comments

 

             C-REACTIVE PROTEIN (test code = 15.8                               

    



             C-REACTIVE PROTEIN)                                        



[facility]2018-05-04 03:06:00





             Test Item    Value        Reference Range Interpretation Comments

 

             Lactic Acid Lvl (test code = Lactic 0.9          0.5-2.2           

        



             Acid Lvl)                                           



[facility] CenterJzzuslmMTIVZCKTOT4150-73-05 03:06:00





             Test Item    Value        Reference Range Interpretation Comments

 

             Sed Rate (test code = 5            See_Comment                [Auto

mated message] The



             Sed Rate)                                           system which ge

nerated this



                                                                 result transmit

huma



                                                                 reference range

: <=15. The



                                                                 reference range

 was not



                                                                 used to interpr

et this



                                                                 result as zayda

l/abnormal.



[facility]HuhwuqzLIHIUZINGK5425-39-13 03:06:00





             Test Item    Value        Reference Range Interpretation Comments

 

             C-REACTIVE PROTEIN (test code = 15.8                               

    



             C-REACTIVE PROTEIN)                                        



[facility] CenterIuvwxqxNDPFFTTOUY8363-50-58 00:44:00





             Test Item    Value        Reference Range Interpretation Comments

 

             PT (test code = PT) 13.0 s       12.0-14.7                 



[facility] CenterLxnxgckMYZEKGPUYD0739-66-97 00:44:00





             Test Item    Value        Reference Range Interpretation Comments

 

             INR (test code = INR) 0.98 1       0.85-1.17                 



[facility] CenterQajmlgjCRELEYUEAW3802-26-90 00:44:00





             Test Item    Value        Reference Range Interpretation Comments

 

             PTT (test code = PTT) 33.2 s       22.9-35.8                 



[facility] CenterOrgdjkkANMHURVUNI1980-56-68 00:44:00





             Test Item    Value        Reference Range Interpretation Comments

 

             PT (test code = PT) 13.0 s       12.0-14.7                 



[facility] CenterWdqncsqLGFHUCMRTX6570-24-62 00:44:00





             Test Item    Value        Reference Range Interpretation Comments

 

             INR (test code = INR) 0.98 1       0.85-1.17                 



[facility] CenterJlrnmqrIQVPDTCOVU4117-03-61 00:44:00





             Test Item    Value        Reference Range Interpretation Comments

 

             PTT (test code = PTT) 33.2 s       22.9-35.8                 



[facility] CenterJjnpeqbXNLLYOEEKY0420-21-48 00:44:00





             Test Item    Value        Reference Range Interpretation Comments

 

             PT (test code = PT) 13.0 s       12.0-14.7                 



[facility] CenterLooorfkOFGSGXINUR4020-90-02 00:44:00





             Test Item    Value        Reference Range Interpretation Comments

 

             INR (test code = INR) 0.98 1       0.85-1.17                 



[facility] CenterKudicfbFGKYBRNAHX1472-56-35 00:44:00





             Test Item    Value        Reference Range Interpretation Comments

 

             PTT (test code = PTT) 33.2 s       22.9-35.8                 



[facility] CenterBwqehjfUYCDJJTYWH9165-37-54 00:44:00





             Test Item    Value        Reference Range Interpretation Comments

 

             PT (test code = PT) 13.0 s       12.0-14.7                 



[facility] CenterYzpadtzNPDCSCBGMN9058-83-75 00:44:00





             Test Item    Value        Reference Range Interpretation Comments

 

             INR (test code = INR) 0.98 1       0.85-1.17                 



[id number]44[facility]00





             Test Item    Value        Reference Range Interpretation Comments

 

             PTT (test code = PTT) 33.2 s       22.9-35.8                 



[facility] [id number] 23:51:00





             Test Item    Value        Reference Range Interpretation Comments

 

             Antibody Scrn (test Negative (5/3/18 6:51                          

 



             code = Antibody Scrn) PM)                                    



[facility] [id number] 23:51:00





             Test Item    Value        Reference Range Interpretation Comments

 

             ABO/Rh (test code = ABO/Rh) AB POS                                 



[facility] [id number] 23:51:00





             Test Item    Value        Reference Range Interpretation Comments

 

             Antibody Scrn (test Negative (5/3/18 6:51                          

 



             code = Antibody Scrn) PM)                                    



[facility] [id number] 23:51:00





             Test Item    Value        Reference Range Interpretation Comments

 

             ABO/Rh (test code = ABO/Rh) AB POS                                 



[facility] [id number] 23:51:00





             Test Item    Value        Reference Range Interpretation Comments

 

             Antibody Scrn (test Negative (5/3/18 6:51                          

 



             code = Antibody Scrn) PM)                                    



[facility] [id number] 23:51:00





             Test Item    Value        Reference Range Interpretation Comments

 

             ABO/Rh (test code = ABO/Rh) AB POS                                 



[facility] [id number] 23:51:00





             Test Item    Value        Reference Range Interpretation Comments

 

             Antibody Scrn (test Negative (5/3/18 6:51                          

 



             code = Antibody Scrn) PM)                                    



[facility] [id number] 23:51:00





             Test Item    Value        Reference Range Interpretation Comments

 

             ABO/Rh (test code = ABO/Rh) AB POS                                 



[facility] AND [id number] 23:35:00





             Test Item    Value        Reference Range Interpretation Comments

 

             UA Urobilinogen (test code = UA 0.2          0.1-1.0               

    



             Urobilinogen)                                        



[facility] AND [id number] 23:35:00





             Test Item    Value        Reference Range Interpretation Comments

 

             UA Nitrite (test code Negative (5/3/18 6:35                        

   



             = UA Nitrite) PM)                                    



[facility] AND  23:35:00





             Test Item    Value        Reference Range Interpretation Comments

 

             UA Glucose (test code Negative (5/3/18 6:35                        

   



             = UA Glucose) PM)                                    



[facility] AND  23:35:00





             Test Item    Value        Reference Range Interpretation Comments

 

             UA Ketones (test code Negative *NA*(5/3/18                         

  



             = UA Ketones) 6:35 PM)                               



[facility] AND  23:35:00





             Test Item    Value        Reference Range Interpretation Comments

 

             UA Bili (test code = Negative *NA*(5/3/18                          

 



             UA Bili)     6:35 PM)                               



[facility] AND [id number] 23:35:00





             Test Item    Value        Reference Range Interpretation Comments

 

             UA Blood (test code = Trace *ABN*(5/3/18                           



             UA Blood)    6:35 PM)                               



[facility] AND  23:35:00





             Test Item    Value        Reference Range Interpretation Comments

 

             UA Leuk Est (test code Small *ABN*(5/3/18 6:35                     

      



             = UA Leuk Est) PM)                                    



[facility] AND  23:35:00





             Test Item    Value        Reference Range Interpretation Comments

 

             UA Spec Grav (test code = UA Spec 1.020 1                          

      



             Grav)                                               



[facility] AND  23:35:00





             Test Item    Value        Reference Range Interpretation Comments

 

             UA pH (test code = UA pH) 6.0 1        5.0-8.0                   



[facility] AND  23:35:00





             Test Item    Value        Reference Range Interpretation Comments

 

             UA Color (test code = Yellow *NA*(5/3/18 6:35                      

     



             UA Color)    PM)                                    



[facility] AND [id number] 23:35:00





             Test Item    Value        Reference Range Interpretation Comments

 

             UA Protein (test code = Trace *ABN*(5/3/18                         

  



             UA Protein)  6:35 PM)                               



[facility] AND  23:35:00





             Test Item    Value        Reference Range Interpretation Comments

 

             UA Turbidity (test code Slight Cloudy (5/3/18                      

     



             = UA Turbidity) 6:35 PM)                               



[facility] AND [id number] 23:35:00





             Test Item    Value        Reference Range Interpretation Comments

 

             UA Hyal Cast 0-2 (5/3/18 6:35 See_Comment                [Automated

 message]



             (test code = UA PM)                                    The system w

[facility]



             Hyal Cast)                                          generated this 

result



                                                                 transmitted ref

erence



                                                                 range: <=2. The



                                                                 reference range

 was



                                                                 not used to int

erpret



                                                                 this result as



                                                                 normal/abnormal

.



[facility] AND [id number] 23:35:00





             Test Item    Value        Reference Range Interpretation Comments

 

             UA Bacteria (test code = UA Occasional /HPF                        

   



             Bacteria)                                           



[facility] AND [id number] 23:35:00





             Test Item    Value        Reference Range Interpretation Comments

 

             UA RBC (test code 11-20 /HPF   See_Comment                [Automate

d message] The



             = UA RBC)                                           system which ge

nerated



                                                                 this result tra

nsmitted



                                                                 reference range

: <=2. The



                                                                 reference range

 was not



                                                                 used to interpr

et this



                                                                 result as



                                                                 normal/abnormal

.



[facility] AND [id number] 23:35:00





             Test Item    Value        Reference Range Interpretation Comments

 

             UA Sq Epi (test code = UA Sq Occasional /LPF                       

    



             Epi)                                                



[facility] AND [id number] 23:35:00





             Test Item    Value        Reference Range Interpretation Comments

 

             UA WBC (test code = UA WBC)  /HPF                            



[facility] AND [id number] 23:35:00





             Test Item    Value        Reference Range Interpretation Comments

 

             UA Urobilinogen (test code = UA 0.2          0.1-1.0               

    



             Urobilinogen)                                        



[facility] AND [id number] 23:35:00





             Test Item    Value        Reference Range Interpretation Comments

 

             UA Nitrite (test code Negative (5/3/18 6:35                        

   



             = UA Nitrite) PM)                                    



[facility] AND [id number] 23:35:00





             Test Item    Value        Reference Range Interpretation Comments

 

             UA Glucose (test code Negative (5/3/18 6:35                        

   



             = UA Glucose) PM)                                    



[facility] AND [id number] 23:35:00





             Test Item    Value        Reference Range Interpretation Comments

 

             UA Ketones (test code Negative *NA*(5/3/18                         

  



             = UA Ketones) 6:35 PM)                               



[facility] AND [id number] 23:35:00





             Test Item    Value        Reference Range Interpretation Comments

 

             UA Bili (test code = Negative *NA*(5/3/18                          

 



             UA Bili)     6:35 PM)                               



[facility] AND [id number] 23:35:00





             Test Item    Value        Reference Range Interpretation Comments

 

             UA Blood (test code = Trace *ABN*(5/3/18                           



             UA Blood)    6:35 PM)                               



[facility] AND [id number] 23:35:00





             Test Item    Value        Reference Range Interpretation Comments

 

             UA Leuk Est (test code Small *ABN*(5/3/18 6:35                     

      



             = UA Leuk Est) PM)                                    



[facility] AND  23:35:00





             Test Item    Value        Reference Range Interpretation Comments

 

             UA Spec Grav (test code = UA Spec 1.020 1                          

      



             Grav)                                               



[facility] AND  23:35:00





             Test Item    Value        Reference Range Interpretation Comments

 

             UA pH (test code = UA pH) 6.0 1        5.0-8.0                   



[facility] AND  23:35:00





             Test Item    Value        Reference Range Interpretation Comments

 

             UA Color (test code = Yellow *NA*(5/3/18 6:35                      

     



             UA Color)    PM)                                    



[facility] AND  23:35:00





             Test Item    Value        Reference Range Interpretation Comments

 

             UA Protein (test code = Trace *ABN*(5/3/18                         

  



             UA Protein)  6:35 PM)                               



[facility] AND [id number] 23:35:00





             Test Item    Value        Reference Range Interpretation Comments

 

             UA Turbidity (test code Slight Cloudy (5/3/18                      

     



             = UA Turbidity) 6:35 PM)                               



[facility] AND [id number] 23:35:00





             Test Item    Value        Reference Range Interpretation Comments

 

             UA Hyal Cast 0-2 (5/3/18 6:35 See_Comment                [Automated

 message]



             (test code = UA PM)                                    The system w

[facility]



             Hyal Cast)                                          generated this 

result



                                                                 transmitted ref

erence



                                                                 range: <=2. The



                                                                 reference range

 was



                                                                 not used to int

erpret



                                                                 this result as



                                                                 normal/abnormal

.



[facility] AND  23:35:00





             Test Item    Value        Reference Range Interpretation Comments

 

             UA Bacteria (test code = UA Occasional /HPF                        

   



             Bacteria)                                           



[facility] AND [id number] 23:35:00





             Test Item    Value        Reference Range Interpretation Comments

 

             UA RBC (test code 11-20 /HPF   See_Comment                [Automate

d message] The



             = UA RBC)                                           system which ge

nerated



                                                                 this result tra

nsmitted



                                                                 reference range

: <=2. The



                                                                 reference range

 was not



                                                                 used to interpr

et this



                                                                 result as



                                                                 normal/abnormal

.



[facility] AND [id number] 23:35:00





             Test Item    Value        Reference Range Interpretation Comments

 

             UA Sq Epi (test code = UA Sq Occasional /LPF                       

    



             Epi)                                                



[facility] AND [id number] 23:35:00





             Test Item    Value        Reference Range Interpretation Comments

 

             UA WBC (test code = UA WBC)  /HPF                            



[facility] AND [id number] 23:35:00





             Test Item    Value        Reference Range Interpretation Comments

 

             UA Urobilinogen (test code = UA 0.2          0.1-1.0               

    



             Urobilinogen)                                        



[facility] AND [id number] 23:35:00





             Test Item    Value        Reference Range Interpretation Comments

 

             UA Nitrite (test code Negative (5/3/18 6:35                        

   



             = UA Nitrite) PM)                                    



[facility] AND [id number] 23:35:00





             Test Item    Value        Reference Range Interpretation Comments

 

             UA Glucose (test code Negative (5/3/18 6:35                        

   



             = UA Glucose) PM)                                    



[facility] AND [id number] 23:35:00





             Test Item    Value        Reference Range Interpretation Comments

 

             UA Ketones (test code Negative *NA*(5/3/18                         

  



             = UA Ketones) 6:35 PM)                               



[facility] AND [id number] 23:35:00





             Test Item    Value        Reference Range Interpretation Comments

 

             UA Bili (test code = Negative *NA*(5/3/18                          

 



             UA Bili)     6:35 PM)                               



[facility] AND [id number] 23:35:00





             Test Item    Value        Reference Range Interpretation Comments

 

             UA Blood (test code = Trace *ABN*(5/3/18                           



             UA Blood)    6:35 PM)                               



[facility] AND [id number] 23:35:00





             Test Item    Value        Reference Range Interpretation Comments

 

             UA Leuk Est (test code Small *ABN*(5/3/18 6:35                     

      



             = UA Leuk Est) PM)                                    



[facility] AND [id number] 23:35:00





             Test Item    Value        Reference Range Interpretation Comments

 

             UA Spec Grav (test code = UA Spec 1.020 1                          

      



             Grav)                                               



[facility] AND [id number] 23:35:00





             Test Item    Value        Reference Range Interpretation Comments

 

             UA pH (test code = UA pH) 6.0 1        5.0-8.0                   



[facility] [id number] 23:35:00





             Test Item    Value        Reference Range Interpretation Comments

 

             UA Color (test code = Yellow *NA*(5/3/18 6:35                      

     



             UA Color)    PM)                                    



[facility] AND [id number] 23:35:00





             Test Item    Value        Reference Range Interpretation Comments

 

             UA Protein (test code = Trace *ABN*(5/3/18                         

  



             UA Protein)  6:35 PM)                               



[facility] AND [id number] 23:35:00





             Test Item    Value        Reference Range Interpretation Comments

 

             UA Turbidity (test code Slight Cloudy (5/3/18                      

     



             = UA Turbidity) 6:35 PM)                               



[facility] AND [id number] 23:35:00





             Test Item    Value        Reference Range Interpretation Comments

 

             UA Hyal Cast 0-2 (5/3/18 6:35 See_Comment                [Automated

 message]



             (test code = UA PM)                                    The system w

michelle



             Hyal Cast)                                          generated this 

result



                                                                 transmitted ref

erence



                                                                 range: <=2. The



                                                                 reference range

 was



                                                                 not used to int

erpret



                                                                 this result as



                                                                 normal/abnormal

.



[facility] AND [id number] 23:35:00





             Test Item    Value        Reference Range Interpretation Comments

 

             UA Bacteria (test code = UA Occasional /HPF                        

   



             Bacteria)                                           



[facility] AND [id number] 23:35:00





             Test Item    Value        Reference Range Interpretation Comments

 

             UA RBC (test code 11-20 /HPF   See_Comment                [Automate

d message] The



             = UA RBC)                                           system which ge

nerated



                                                                 this result tra

nsmitted



                                                                 reference range

: <=2. The



                                                                 reference range

 was not



                                                                 used to interpr

et this



                                                                 result as



                                                                 normal/abnormal

.



[facility] [id number] 23:35:00





             Test Item    Value        Reference Range Interpretation Comments

 

             UA Sq Epi (test code = UA Sq Occasional /LPF                       

    



             Epi)                                                



[facility] AND [id number] 23:35:00





             Test Item    Value        Reference Range Interpretation Comments

 

             UA WBC (test code = UA WBC)  /HPF                            



[facility] AND [id number] 23:35:00





             Test Item    Value        Reference Range Interpretation Comments

 

             UA Urobilinogen (test code = UA 0.2          0.1-1.0               

    



             Urobilinogen)                                        



[facility] AND [id number] 23:35:00





             Test Item    Value        Reference Range Interpretation Comments

 

             UA Nitrite (test code Negative (5/3/18 6:35                        

   



             = UA Nitrite) PM)                                    



[facility] AND [id number] 23:35:00





             Test Item    Value        Reference Range Interpretation Comments

 

             UA Glucose (test code Negative (5/3/18 6:35                        

   



             = UA Glucose) PM)                                    



[facility] AND [id number] 23:35:00





             Test Item    Value        Reference Range Interpretation Comments

 

             UA Ketones (test code Negative *NA*(5/3/18                         

  



             = UA Ketones) 6:35 PM)                               



[facility] AND [id number] 23:35:00





             Test Item    Value        Reference Range Interpretation Comments

 

             UA Bili (test code = Negative *NA*(5/3/18                          

 



             UA Bili)     6:35 PM)                               



[facility] AND [id number] 23:35:00





             Test Item    Value        Reference Range Interpretation Comments

 

             UA Blood (test code = Trace *ABN*(5/3/18                           



             UA Blood)    6:35 PM)                               



[facility] AND [id number] 23:35:00





             Test Item    Value        Reference Range Interpretation Comments

 

             UA Leuk Est (test code Small *ABN*(5/3/18 6:35                     

      



             = UA Leuk Est) PM)                                    



[facility] AND [id number] 23:35:00





             Test Item    Value        Reference Range Interpretation Comments

 

             UA Spec Grav (test code = UA Spec 1.020 1                          

      



             Grav)                                               



[facility] AND [id number] 23:35:00





             Test Item    Value        Reference Range Interpretation Comments

 

             UA pH (test code = UA pH) 6.0 1        5.0-8.0                   



[facility] AND [id number] 23:35:00





             Test Item    Value        Reference Range Interpretation Comments

 

             UA Color (test code = Yellow *NA*(5/3/18 6:35                      

     



             UA Color)    PM)                                    



[facility] AND [id number] 23:35:00





             Test Item    Value        Reference Range Interpretation Comments

 

             UA Protein (test code = Trace *ABN*(5/3/18                         

  



             UA Protein)  6:35 PM)                               



[facility] AND [id number] 23:35:00





             Test Item    Value        Reference Range Interpretation Comments

 

             UA Turbidity (test code Slight Cloudy (5/3/18                      

     



             = UA Turbidity) 6:35 PM)                               



[facility] AND [id number] 23:35:00





             Test Item    Value        Reference Range Interpretation Comments

 

             UA Hyal Cast 0-2 (5/3/18 6:35 See_Comment                [Automated

 message]



             (test code = UA PM)                                    The system w

[facility]



             Hyal Cast)                                          generated this 

result



                                                                 transmitted ref

erence



                                                                 range: <=2. The



                                                                 reference range

 was



                                                                 not used to int

erpret



                                                                 this result as



                                                                 normal/abnormal

.



[facility] AND [id number] 23:35:00





             Test Item    Value        Reference Range Interpretation Comments

 

             UA Bacteria (test code = UA Occasional /HPF                        

   



             Bacteria)                                           



[facility] AND [id number] 23:35:00





             Test Item    Value        Reference Range Interpretation Comments

 

             UA RBC (test code 11-20 /HPF   See_Comment                [Automate

d message] The



             = UA RBC)                                           system which ge

nerated



                                                                 this result tra

nsmitted



                                                                 reference range

: <=2. The



                                                                 reference range

 was not



                                                                 used to interpr

et this



                                                                 result as



                                                                 normal/abnormal

.



[facility] AND [id number] 23:35:00





             Test Item    Value        Reference Range Interpretation Comments

 

             UA Sq Epi (test code = UA Sq Occasional /LPF                       

    



             Epi)                                                



[facility] AND [id number] 23:35:00





             Test Item    Value        Reference Range Interpretation Comments

 

             UA WBC (test code = UA WBC)  /HPF                            



[id number] 23:20:00





             Test Item    Value        Reference Range Interpretation Comments

 

             Basophils # (test code 0.1          See_Comment                [Aut

omated message] The



             = Basophils #)                                        system which 

generated



                                                                 this result tra

nsmitted



                                                                 reference range

: <=0.2.



                                                                 The reference r

zhen was



                                                                 not used to int

erpret



                                                                 this result as



                                                                 normal/abnormal

.



[facility] [id number] 23:20:00





             Test Item    Value        Reference Range Interpretation Comments

 

             Polychrom (test code = Polychrom) Slight                           

      



[facility] [id number] 23:20:00





             Test Item    Value        Reference Range Interpretation Comments

 

             Plt Morph (test code = Normal (5/3/18 6:20 PM)                     

      



             Plt Morph)                                          



[facility] [id number] 23:20:00





             Test Item    Value        Reference Range Interpretation Comments

 

             Basophils # (test code 0.1          See_Comment                [Aut

omated message] The



             = Basophils #)                                        system which 

generated



                                                                 this result tra

nsmitted



                                                                 reference range

: <=0.2.



                                                                 The reference r

zhen was



                                                                 not used to int

erpret



                                                                 this result as



                                                                 normal/abnormal

.



[facility] [id number] 23:20:00





             Test Item    Value        Reference Range Interpretation Comments

 

             Polychrom (test code = Polychrom) Slight                           

      



[facility] [id number] 23:20:00





             Test Item    Value        Reference Range Interpretation Comments

 

             Plt Morph (test code = Normal (5/3/18 6:20 PM)                     

      



             Plt Morph)                                          



[facility] [id number] 23:20:00





             Test Item    Value        Reference Range Interpretation Comments

 

             Basophils # (test code 0.1          See_Comment                [Aut

omated message] The



             = Basophils #)                                        system which 

generated



                                                                 this result tra

nsmitted



                                                                 reference range

: <=0.2.



                                                                 The reference r

zhen was



                                                                 not used to int

erpret



                                                                 this result as



                                                                 normal/abnormal

.



[facility] [id number] 23:20:00





             Test Item    Value        Reference Range Interpretation Comments

 

             Polychrom (test code = Polychrom) Slight                           

      



[facility] [id number] 23:20:00





             Test Item    Value        Reference Range Interpretation Comments

 

             Plt Morph (test code = Normal (5/3/18 6:20 PM)                     

      



             Plt Morph)                                          



[facility] [id number] 23:20:00





             Test Item    Value        Reference Range Interpretation Comments

 

             Basophils # (test code 0.1          See_Comment                [Aut

omated message] The



             = Basophils #)                                        system which 

generated



                                                                 this result tra

nsmitted



                                                                 reference range

: <=0.2.



                                                                 The reference r

zhen was



                                                                 not used to int

erpret



                                                                 this result as



                                                                 normal/abnormal

.



[facility] [id number] 23:20:00





             Test Item    Value        Reference Range Interpretation Comments

 

             Polychrom (test code = Polychrom) Slight                           

      



[facility] [id number] 23:20:00





             Test Item    Value        Reference Range Interpretation Comments

 

             Plt Morph (test code = Normal (5/3/18 6:20 PM)                     

      



             Plt Morph)                                          



[facility] [id number] 16:22:00





             Test Item    Value        Reference Range Interpretation Comments

 

             CULTURE (BEAKER) (test No growth in 5 days                         

  



             code = 1095)                                        



BLOOD [id number] 16:22:00





             Test Item    Value        Reference Range Interpretation Comments

 

             CULTURE (BEAKER) (test No growth in 5 days                         

  



             code = 1095)                                        



(MANUAL DIFFERENTIAL)2017 22:29:00





             Test Item    Value        Reference Range Interpretation Comments

 

             TOTAL COUNTED (BEAKER) (test code =                                

        



             1351)                                               

 

             WBC MORPHOLOGY (BEAKER) (test code = Normal                        

         



             487)                                                

 

             PLT MORPHOLOGY (BEAKER) (test code = Normal                        

         



             486)                                                

 

             RBC MORPHOLOGY (BEAKER) (test code = Normal                        

         



             762)                                                



CBC W/PLT COUNT &amp; AUTO [id number] 22:28:00





             Test Item    Value        Reference Range Interpretation Comments

 

             WHITE BLOOD CELL COUNT (BEAKER) 7.3 K/ L     4.0-10.0              

    



             (test code = 775)                                        

 

             RED BLOOD CELL COUNT (BEAKER) 5.09 M/ L    4.20-5.80               

  



             (test code = 761)                                        

 

             HEMOGLOBIN (BEAKER) (test code = 13.0 GM/DL   13.0-16.8            

     



             410)                                                

 

             HEMATOCRIT (BEAKER) (test code = 42.3 %       40.0-50.0            

     



             411)                                                

 

             MEAN CORPUSCULAR VOLUME (BEAKER) 83.0 fL      82.0-98.0            

     



             (test code = 753)                                        

 

             MEAN CORPUSCULAR HEMOGLOBIN 25.6 pg      27.0-33.0    L            



             (BEAKER) (test code = 751)                                        

 

             MEAN CORPUSCULAR HEMOGLOBIN CONC 30.8 GM/DL   32.0-36.0    L       

     



             (BEAKER) (test code = 752)                                        

 

             RED CELL DISTRIBUTION WIDTH 18.0 %       10.3-14.2    H            



             (BEAKER) (test code = 412)                                        

 

             PLATELET COUNT (BEAKER) (test 331 K/CU MM  150-430                 

  



             code = 756)                                         

 

             MEAN PLATELET VOLUME (BEAKER) 8.5 fL       6.5-10.5                

  



             (test code = 754)                                        

 

             NUCLEATED RED BLOOD CELLS 0 /100 WBC   0-0                       



             (BEAKER) (test code = 413)                                        

 

             NEUTROPHILS RELATIVE PERCENT 65 %                                  

 



             (BEAKER) (test code = 429)                                        

 

             LYMPHOCYTES RELATIVE PERCENT 23 %                                  

 



             (BEAKER) (test code = 430)                                        

 

             MONOCYTES RELATIVE PERCENT 8 %                                    



             (BEAKER) (test code = 431)                                        

 

             EOSINOPHILS RELATIVE PERCENT 3 %                                   

 



             (BEAKER) (test code = 432)                                        

 

             BASOPHILS RELATIVE PERCENT 1 %                                    



             (BEAKER) (test code = 437)                                        

 

             NEUTROPHILS ABSOLUTE COUNT 4.75 K/ L    1.80-8.00                 



             (BEAKER) (test code = 670)                                        

 

             LYMPHOCYTES ABSOLUTE COUNT 1.68 K/ L    1.48-4.50                 



             (BEAKER) (test code = 414)                                        

 

             MONOCYTES ABSOLUTE COUNT (BEAKER) 0.55 K/ L    0.00-1.30           

      



             (test code = 415)                                        

 

             EOSINOPHILS ABSOLUTE COUNT 0.24 K/ L    0.00-0.50                 



             (BEAKER) (test code = 416)                                        

 

             BASOPHILS ABSOLUTE COUNT (BEAKER) 0.05 K/ L    0.00-0.20           

      



             (test code = 417)                                        



[id number] METABOLIC [id number] 11:11:00





             Test Item    Value        Reference Range Interpretation Comments

 

             SODIUM (BEAKER) 138 meq/L    136-145                   



             (test code = 381)                                        

 

             POTASSIUM (BEAKER) 4.0 meq/L    3.5-5.1                   



             (test code = 379)                                        

 

             CHLORIDE (BEAKER) 108 meq/L           H            



             (test code = 382)                                        

 

             CO2 (BEAKER) (test 23 meq/L     22-29                     



             code = 355)                                         

 

             BLOOD UREA NITROGEN 11 mg/dL     7-21                      



             (BEAKER) (test code                                        



             = 354)                                              

 

             CREATININE (BEAKER) 0.74 mg/dL   0.57-1.25                 



             (test code = 358)                                        

 

             GLUCOSE RANDOM 124 mg/dL           H            



             (BEAKER) (test code                                        



             = 652)                                              

 

             CALCIUM (BEAKER) 8.9 mg/dL    8.4-10.2                  



             (test code = 697)                                        

 

             EGFR (BEAKER) (test 150 mL/min/1.73                           ESTIM

ATED GFR IS



             code = 1092) sq m                                   NOT AS ACCURATE

 AS



                                                                 CREATININE



                                                                 CLEARANCE IN



                                                                 PREDICTING



                                                                 GLOMERULAR



                                                                 FILTRATION RATE

.



                                                                 ESTIMATED GFR I

S



                                                                 NOT APPLICABLE 

FOR



                                                                 DIALYSIS PATIEN

TS.



URINE [id number] 09:56:00





             Test Item    Value        Reference Range Interpretation Comments

 

             CULTURE (BEAKER) (test <10,000 col/mL skin                         

  



             code = 1095) jaime                                  



COMPREHENSIVE METABOLIC [id number] 08:49:00





             Test Item    Value        Reference Range Interpretation Comments

 

             TOTAL PROTEIN 7.3 gm/dL    6.0-8.3                   



             (BEAKER) (test code =                                        



             770)                                                

 

             ALBUMIN (BEAKER) 3.3 g/dL     3.5-5.0      L            



             (test code = 1145)                                        

 

             ALKALINE PHOSPHATASE 89 U/L                           



             (BEAKER) (test code =                                        



             346)                                                

 

             BILIRUBIN TOTAL 1.4 mg/dL    0.2-1.2      H            



             (BEAKER) (test code =                                        



             377)                                                

 

             SODIUM (BEAKER) (test 137 meq/L    136-145                   



             code = 381)                                         

 

             POTASSIUM (BEAKER) 3.7 meq/L    3.5-5.1                   



             (test code = 379)                                        

 

             CHLORIDE (BEAKER) 108 meq/L           H            



             (test code = 382)                                        

 

             CO2 (BEAKER) (test 17 meq/L     22-29        L            



             code = 355)                                         

 

             BLOOD UREA NITROGEN 12 mg/dL     7-21                      



             (BEAKER) (test code =                                        



             354)                                                

 

             CREATININE (BEAKER) 0.78 mg/dL   0.57-1.25                 



             (test code = 358)                                        

 

             GLUCOSE RANDOM 119 mg/dL           H            



             (BEAKER) (test code =                                        



             652)                                                

 

             CALCIUM (BEAKER) 8.6 mg/dL    8.4-10.2                  



             (test code = 697)                                        

 

             AST (SGOT) (BEAKER) 13 U/L       5-34                      



             (test code = 353)                                        

 

             ALT (SGPT) (BEAKER) 28 U/L       6-55                      



             (test code = 347)                                        

 

             EGFR (BEAKER) (test 141                                    ESTIMATE

D GFR IS



             code = 1092) mL/min/1.73 sq                           NOT AS ACCURA

TE AS



                          m                                      CREATININE



                                                                 CLEARANCE IN



                                                                 PREDICTING



                                                                 GLOMERULAR



                                                                 FILTRATION RATE

.



                                                                 ESTIMATED GFR I

S



                                                                 NOT APPLICABLE 

FOR



                                                                 DIALYSIS PATIEN

TS.



CBC W/PLT COUNT &amp; AUTO [id number] 08:46:00





             Test Item    Value        Reference Range Interpretation Comments

 

             WHITE BLOOD CELL COUNT (BEAKER) 9.4 K/ L     4.0-10.0              

    



             (test code = 775)                                        

 

             RED BLOOD CELL COUNT (BEAKER) 4.79 M/ L    4.20-5.80               

  



             (test code = 761)                                        

 

             HEMOGLOBIN (BEAKER) (test code = 12.8 GM/DL   13.0-16.8    L       

     



             410)                                                

 

             HEMATOCRIT (BEAKER) (test code = 40.0 %       40.0-50.0            

     



             411)                                                

 

             MEAN CORPUSCULAR VOLUME (BEAKER) 83.4 fL      82.0-98.0            

     



             (test code = 753)                                        

 

             MEAN CORPUSCULAR HEMOGLOBIN 26.7 pg      27.0-33.0    L            



             (BEAKER) (test code = 751)                                        

 

             MEAN CORPUSCULAR HEMOGLOBIN CONC 32.0 GM/DL   32.0-36.0            

     



             (BEAKER) (test code = 752)                                        

 

             RED CELL DISTRIBUTION WIDTH 18.2 %       10.3-14.2    H            



             (BEAKER) (test code = 412)                                        

 

             PLATELET COUNT (BEAKER) (test 313 K/CU MM  150-430                 

  



             code = 756)                                         

 

             MEAN PLATELET VOLUME (BEAKER) 8.6 fL       6.5-10.5                

  



             (test code = 754)                                        

 

             NUCLEATED RED BLOOD CELLS 0 /100 WBC   0-0                       



             (BEAKER) (test code = 413)                                        

 

             NEUTROPHILS RELATIVE PERCENT 70 %                                  

 



             (BEAKER) (test code = 429)                                        

 

             LYMPHOCYTES RELATIVE PERCENT 16 %                                  

 



             (BEAKER) (test code = 430)                                        

 

             MONOCYTES RELATIVE PERCENT 12 %                                   



             (BEAKER) (test code = 431)                                        

 

             EOSINOPHILS RELATIVE PERCENT 1 %                                   

 



             (BEAKER) (test code = 432)                                        

 

             BASOPHILS RELATIVE PERCENT 1 %                                    



             (BEAKER) (test code = 437)                                        

 

             NEUTROPHILS ABSOLUTE COUNT 6.54 K/ L    1.80-8.00                 



             (BEAKER) (test code = 670)                                        

 

             LYMPHOCYTES ABSOLUTE COUNT 1.50 K/ L    1.48-4.50                 



             (BEAKER) (test code = 414)                                        

 

             MONOCYTES ABSOLUTE COUNT (BEAKER) 1.13 K/ L    0.00-1.30           

      



             (test code = 415)                                        

 

             EOSINOPHILS ABSOLUTE COUNT 0.13 K/ L    0.00-0.50                 



             (BEAKER) (test code = 416)                                        

 

             BASOPHILS ABSOLUTE COUNT (BEAKER) 0.06 K/ L    0.00-0.20           

      



             (test code = 417)                                        



0.00URINALYSIS W/ [id number] 20:36:00





             Test Item    Value        Reference Range Interpretation Comments

 

             COLOR (BEAKER) (test code = 470) Yellow                            

     

 

             CLARITY (BEAKER) (test code = 469) Hazy                            

       

 

             SPECIFIC GRAVITY UA (BEAKER) (test 1.012        1.001-1.035        

       



             code = 468)                                         

 

             PH UA (BEAKER) (test code = 467) 5.5          5.0-8.0              

     

 

             PROTEIN UA (BEAKER) (test code = 100 mg/dL    Negative     A       

     



             464)                                                

 

             GLUCOSE UA (BEAKER) (test code = Negative     Negative             

     



             365)                                                

 

             KETONES UA (BEAKER) (test code = Negative     Negative             

     



             371)                                                

 

             BILIRUBIN UA (BEAKER) (test code = Negative     Negative           

       



             462)                                                

 

             BLOOD UA (BEAKER) (test code = 461) Moderate     Negative     A    

        

 

             NITRITE UA (BEAKER) (test code = Negative     Negative             

     



             465)                                                

 

             LEUKOCYTE ESTERASE UA (BEAKER) Large        Negative     A         

   



             (test code = 466)                                        

 

             UROBILINOGEN UA (BEAKER) (test code 3.0 mg/dL    0.2-1.0      H    

        



             = 463)                                              

 

             RBC UA (BEAKER) (test code = 519) 19 /HPF                          

      

 

             WBC UA (BEAKER) (test code = 520) 182 /HPF                         

      

 

             SOURCE(BEAKER) (test code = 2795)                                  

      



BASIC METABOLIC [id number] 17:00:00





             Test Item    Value        Reference Range Interpretation Comments

 

             SODIUM (BEAKER) 140 meq/L    136-145                   



             (test code = 381)                                        

 

             POTASSIUM (BEAKER) 3.7 meq/L    3.5-5.1                   



             (test code = 379)                                        

 

             CHLORIDE (BEAKER) 112 meq/L           H            



             (test code = 382)                                        

 

             CO2 (BEAKER) (test 17 meq/L     22-29        L            



             code = 355)                                         

 

             BLOOD UREA NITROGEN 23 mg/dL     7-21         H            



             (BEAKER) (test code                                        



             = 354)                                              

 

             CREATININE (BEAKER) 1.00 mg/dL   0.57-1.25                 



             (test code = 358)                                        

 

             GLUCOSE RANDOM 102 mg/dL                        



             (BEAKER) (test code                                        



             = 652)                                              

 

             CALCIUM (BEAKER) 8.7 mg/dL    8.4-10.2                  



             (test code = 697)                                        

 

             EGFR (BEAKER) (test 106 mL/min/1.73                           ESTIM

ATED GFR IS



             code = 1092) sq m                                   NOT AS ACCURATE

 AS



                                                                 CREATININE



                                                                 CLEARANCE IN



                                                                 PREDICTING



                                                                 GLOMERULAR



                                                                 FILTRATION RATE

.



                                                                 ESTIMATED GFR I

S



                                                                 NOT APPLICABLE 

FOR



                                                                 DIALYSIS PATIEN

TS.



Specimen slightly ictericCBC W/PLT COUNT &amp; AUTO [id number] 
12:18:00





             Test Item    Value        Reference Range Interpretation Comments

 

             WHITE BLOOD CELL COUNT (BEAKER) 16.4 K/ L    4.0-10.0     H        

    



             (test code = 775)                                        

 

             RED BLOOD CELL COUNT (BEAKER) 5.22 M/ L    4.20-5.80               

  



             (test code = 761)                                        

 

             HEMOGLOBIN (BEAKER) (test code = 14.4 GM/DL   13.0-16.8            

     



             410)                                                

 

             HEMATOCRIT (BEAKER) (test code = 42.9 %       40.0-50.0            

     



             411)                                                

 

             MEAN CORPUSCULAR VOLUME (BEAKER) 82.2 fL      82.0-98.0            

     



             (test code = 753)                                        

 

             MEAN CORPUSCULAR HEMOGLOBIN 27.5 pg      27.0-33.0                 



             (BEAKER) (test code = 751)                                        

 

             MEAN CORPUSCULAR HEMOGLOBIN CONC 33.5 GM/DL   32.0-36.0            

     



             (BEAKER) (test code = 752)                                        

 

             RED CELL DISTRIBUTION WIDTH 16.2 %       10.3-14.2    H            



             (BEAKER) (test code = 412)                                        

 

             PLATELET COUNT (BEAKER) (test 329 K/CU MM  150-430                 

  



             code = 756)                                         

 

             MEAN PLATELET VOLUME (BEAKER) 8.2 fL       6.5-10.5                

  



             (test code = 754)                                        

 

             NUCLEATED RED BLOOD CELLS 0 /100 WBC   0-0                       



             (BEAKER) (test code = 413)                                        

 

             NEUTROPHILS RELATIVE PERCENT 83 %                                  

 



             (BEAKER) (test code = 429)                                        

 

             LYMPHOCYTES RELATIVE PERCENT 7 %                                   

 



             (BEAKER) (test code = 430)                                        

 

             MONOCYTES RELATIVE PERCENT 9 %                                    



             (BEAKER) (test code = 431)                                        

 

             EOSINOPHILS RELATIVE PERCENT 0 %                                   

 



             (BEAKER) (test code = 432)                                        

 

             BASOPHILS RELATIVE PERCENT 0 %                                    



             (BEAKER) (test code = 437)                                        

 

             NEUTROPHILS ABSOLUTE COUNT 1.62 K/ L    1.80-8.00    L            



             (BEAKER) (test code = 670)                                        

 

             LYMPHOCYTES ABSOLUTE COUNT 1.15 K/ L    1.48-4.50    L            



             (BEAKER) (test code = 414)                                        

 

             MONOCYTES ABSOLUTE COUNT (BEAKER) 1.56 K/ L    0.00-1.30    H      

      



             (test code = 415)                                        

 

             EOSINOPHILS ABSOLUTE COUNT 0.01 K/ L    0.00-0.50                 



             (BEAKER) (test code = 416)                                        

 

             BASOPHILS ABSOLUTE COUNT (BEAKER) 0.02 K/ L    0.00-0.20           

      



             (test code = 417)                                        



(MANUAL DIFFERENTIAL)2017 12:18:00





             Test Item    Value        Reference Range Interpretation Comments

 

             TOTAL COUNTED (BEAKER) (test code =                                

        



             1351)                                               

 

             WBC MORPHOLOGY (BEAKER) (test code = Normal                        

         



             487)                                                

 

             PLT MORPHOLOGY (BEAKER) (test code = Normal                        

         



             486)                                                

 

             RBC MORPHOLOGY (BEAKER) (test code = Normal                        

         



             762)

## 2023-01-23 NOTE — RAD REPORT
EXAM DESCRIPTION:  RAD - Chest Single View - 1/23/2023 4:23 pm

 

CLINICAL HISTORY:  post central line placement

 

COMPARISON:  Portable chest 12/14/2022

 

TECHNIQUE:  AP portable chest image was obtained 1/23/2023 4:23 pm .

 

FINDINGS:  Left jugular central line has been placed. Tip is at the SVC atrial junction. There is no 
pneumothorax.

 

Lung parenchyma appears slightly better aerated than on the earlier examination. No new or progressiv
e lung parenchymal process.

 

 Heart and vasculature are normal.

 

IMPRESSION:  Left jugular central line placement with the tip at the SVC atrial junction.

 

No pneumothorax.

## 2023-01-24 LAB
ALBUMIN SERPL BCP-MCNC: 3.1 G/DL (ref 3.4–5)
ALP SERPL-CCNC: 121 U/L (ref 45–117)
ALT SERPL W P-5'-P-CCNC: 25 U/L (ref 16–61)
AST SERPL W P-5'-P-CCNC: 8 U/L (ref 15–37)
BUN BLD-MCNC: 37 MG/DL (ref 7–18)
BUN BLD-MCNC: 38 MG/DL (ref 7–18)
BUN BLD-MCNC: 40 MG/DL (ref 7–18)
GLUCOSE SERPLBLD-MCNC: 377 MG/DL (ref 74–106)
GLUCOSE SERPLBLD-MCNC: 576 MG/DL (ref 74–106)
GLUCOSE SERPLBLD-MCNC: 756 MG/DL (ref 74–106)
HCT VFR BLD CALC: 49.6 % (ref 39.6–49)
HDLC SERPL-MCNC: 34 MG/DL (ref 40–60)
LDLC SERPL CALC-MCNC: 95 MG/DL (ref ?–130)
LYMPHOCYTES # SPEC AUTO: 0.6 K/UL (ref 0.7–4.9)
MCV RBC: 86.7 FL (ref 80–100)
PMV BLD: 8.8 FL (ref 7.6–11.3)
POTASSIUM SERPL-SCNC: 4 MMOL/L (ref 3.5–5.1)
POTASSIUM SERPL-SCNC: 4 MMOL/L (ref 3.5–5.1)
POTASSIUM SERPL-SCNC: 4.5 MMOL/L (ref 3.5–5.1)
RBC # BLD: 5.72 M/UL (ref 4.33–5.43)
TSH SERPL DL<=0.05 MIU/L-ACNC: 0.61 UIU/ML (ref 0.36–3.74)

## 2023-01-24 RX ADMIN — SODIUM CHLORIDE SCH MLS: 0.45 INJECTION, SOLUTION INTRAVENOUS at 16:37

## 2023-01-24 RX ADMIN — SODIUM CHLORIDE SCH MLS: 0.9 INJECTION, SOLUTION INTRAVENOUS at 02:00

## 2023-01-24 RX ADMIN — HYDROMORPHONE HYDROCHLORIDE PRN MG: 1 INJECTION, SOLUTION INTRAMUSCULAR; INTRAVENOUS; SUBCUTANEOUS at 13:05

## 2023-01-24 RX ADMIN — SODIUM CHLORIDE SCH MLS: 0.45 INJECTION, SOLUTION INTRAVENOUS at 14:00

## 2023-01-24 RX ADMIN — SODIUM CHLORIDE SCH MLS: 0.45 INJECTION, SOLUTION INTRAVENOUS at 09:00

## 2023-01-24 RX ADMIN — HYDROMORPHONE HYDROCHLORIDE PRN MG: 1 INJECTION, SOLUTION INTRAMUSCULAR; INTRAVENOUS; SUBCUTANEOUS at 22:13

## 2023-01-24 RX ADMIN — PANTOPRAZOLE SODIUM SCH MG: 40 TABLET, DELAYED RELEASE ORAL at 07:30

## 2023-01-24 NOTE — P.PN
Subjective


Date of Service: 01/24/23


Primary Care Provider: Jesusita


Chief Complaint: DKA


Subjective: Improving





Review of Systems


10-point ROS is otherwise unremarkable





Physical Examination





- Vital Signs


Temperature: 98.5 F


Blood Pressure: 92/57


Pulse: 119


Respirations: 15


Pulse Ox (%): 95





- Physical Exam


General: Alert, In no apparent distress


HEENT: Atraumatic, PERRLA, EOMI


Neck: Supple, JVD not distended


Respiratory: Clear to auscultation bilaterally, Normal air movement


Cardiovascular: Regular rate/rhythm, Normal S1 S2


Gastrointestinal: Normal bowel sounds, No tenderness


Musculoskeletal: No tenderness


Integumentary: No rashes


Neurological: Normal speech, Normal tone, Normal affect


Lymphatics: No axilla or inguinal lymphadenopathy





- Studies


Laboratory Data (last 24 hrs)





01/23/23 16:47: Glucose 1519 H*


01/23/23 13:56: Sodium 131 L, Potassium 5.0, BUN 35 H, Creatinine 2.58 H, 

Glucose 1673 H*, Total Bilirubin 0.7, AST 8 L, ALT 30, Alkaline Phosphatase 168 

H, Lipase 4394 H


01/23/23 13:56: WBC 16.50 H, Hgb 18.0 H, Hct 54.5 H, Plt Count 307








Assessment And Plan





- Current Problems (Diagnosis)


(1) DKA, type 2


Current Visit: Yes   Status: Acute   


Plan: 


will admit the patient to the ICU.  Start him on insulin drip. 


Qualifiers: 


   Diabetes mellitus complication detail: without coma   Qualified Code(s): 

E11.10 - Type 2 diabetes mellitus with ketoacidosis without coma   





(2) Acute kidney failure


Current Visit: Yes   Status: Acute   


Plan: 


most likely prerenal.  Will start aggressive fluids and monitor his creatine.  

His baseline is 0.6 to 1.  Will consult nephrology on this patient. 


1/24


creatine worsened.  He has some hypernatremia as well.  Agree with nephrology 

switching him to 1/2 N saline 


Qualifiers: 


   Acute renal failure type: unspecified   Qualified Code(s): N17.9 - Acute k

idney failure, unspecified   





(3) Spina bifida


Onset Date: 11/03/17   Current Visit: No   Status: Chronic   


Plan: 


Patient is wheelchair bound.  Will keep monitoring him. He was actually healed 

of his ulcer a s few weeks ago. 


Qualifiers: 


   Spinal region: lumbar   Presence of hydrocephalus: without hydrocephalus   

Qualified Code(s): Q05.7 - Lumbar spina bifida without hydrocephalus   


Discharge Plan: Home


Plan to discharge in: Greater than 2 days





- Code Status/Comfort Care


Code Status Assessed: No


Code Status: Full Code


Physician Review: Patient Assessed, Agree with Above Assessment and Plan


Critical Care: Yes


Time Spent Managing PTS Care (In Minutes): 20

## 2023-01-24 NOTE — P.CNS
Date of Consult: 01/24/23


Reason for Consult: Renal failure, hypernatremia


Requesting Physician: Domingo Mc


Primary Care Provider: Jesusita


Chief Complaint: DKA


History of Present Illness: 





Patient is a younger male with a reported history of spina bifida, reportedly 

wheelchair bound with deformities of the feet with left foot wrapped and pt 

unable to elaborate if he has a chronic wound or other. Pt also has IDDM/Type II

DM and possibly was not receiving his Insulin as prescribed. Pt is a poor 

historian and in mild to moderate distress when seen, he cites generalized 

abdominal pain, severe, persistent. He acknowledges some nausea. He has been on 

IVF and on a Insulin drip for DKA on admission. He acknowledges prior DKA 

episodes. His labs show renal insufficiency and pt acknowledges renal issues in 

the past but is not able to elaborate, he reports seeing [facility]. Pt 

appears to have a suprapubic catheter. UA on admission abnormal.


Allergies





levofloxacin [From Levaquin] Allergy (Intermediate, Verified 03/14/22 12:15)


   Hives


morphine Allergy (Intermediate, Verified 03/14/22 12:15)


   Hives


sulfamethoxazole [From Bactrim] Allergy (Intermediate, Verified 03/14/22 12:15)


   Hives


ketorolac tromethamine [From Toradol] Allergy (Verified 03/14/22 12:15)


   Nausea/Vomiting


Penicillins Allergy (Verified 03/14/22 12:15)


   Hives/Rash


vancomycin Allergy (Verified 03/14/22 12:15)


   Hives


ondansetron [From Zofran (as hydrochloride)] Adverse Reaction (Mild, Verified 

03/14/22 12:15)


   Nausea/Vomiting


amoxicillin [From Augmentin] Adverse Reaction (Verified 03/14/22 12:15)


   Hives/Rash


ciprofloxacin Adverse Reaction (Verified 03/14/22 12:15)


   Nausea/Vomiting


doxycycline Adverse Reaction (Verified 03/14/22 12:15)


   Nausea/Vomiting


sesame seed Adverse Reaction (Verified 03/14/22 12:15)


   diarrhea


pop corn Adverse Reaction (Severe, Uncoded 03/14/22 12:15)


   diarrhea





Home Medications: 








Hydromorphone [Dilaudid] 4 mg PO Q8HP PRN 03/14/22 


Insulin Glargine,Hum.rec.anlog [Semglee] 40 unit SQ DAILY 90 Days #40 ml 

06/16/22 


Yordan [Yordan*] 1 pkt PO BID 90 Days #90 powd.pack 06/16/22 


Medihoney [Medihoney Woundcare Gel*] 1 appl TOP DAILY 90 Days #3 tube 06/16/22 


Pen Needle, Diabetic, Safety [Assure Id Pen Needle] 1 each MC DAILY 90 Days #90 

dis.needle 06/16/22 


Amox/Clavulanate [Augmentin 875-125 Tab] 1 each PO BID 7 Days #14 tab 11/28/22 


Ceftriaxone [Rocephin*] 1,000 mg IM DAILY 7 Days #7 ml 11/28/22 


Smz./Tmp. [Bactrim Ds 800 MG/160 MG] 1 tab PO BID 7 Days #14 tab 11/28/22 








- Past Medical/Surgical History


Diabetic: No


-: Spina Bifida with hydrocephalus


-: Depression


-: Insomnia


-: Incontinence


-: GERD


-: Chronic pain syndrome


-: Muscle wasting


-: Paraplegia


-: Shunt revision


-: Cholecystectomy


-: Foot surgery


-: Hip sx BL


-: Bilateral hip surgery


-: Appendectomy


Psychosocial/ Personal History: Patient currently single.  He has no children.  

He is disabled.





- Family History


  ** Father


Medical History: Hypertension





  ** Mother


Medical History: Hypertension





- Social History


Smoking Status: Current every day smoker


Alcohol use: Yes


CD- Drugs: No


Caffeine use: Yes





Review of Systems


is unable to be obtained (Pt is in pain and unable to cooperate with a 

comprehensive ROS, limited ROS as per below)


General: Malaise, As per HPI


Gastrointestinal: Abdominal Pain, Other (Reports last BM yesterday)


Genitourinary: As per HPI


Musculoskeletal: As per HPI


Neurological: As per HPI





Physical Examination














Temp Pulse Resp BP Pulse Ox


 


 98.5 F   119 H  15   92/57 L  95 


 


 01/24/23 09:06  01/24/23 09:06  01/24/23 09:06  01/24/23 09:06  01/24/23 09:06








General: Moderate distress


HEENT: Atraumatic, Normocephalic, Sclerae nonicteric


Neck: Supple, Other (Lt IJ CVC)


Respiratory: Other (B/l air entry, mildly tachypnec)


Cardiovascular: Other (Tachy but mostly regular)


Gastrointestinal: Other (Soft, obese, generalized TTP, some guarding, supra 

pubic catheter with leg bag containing cloudy urine)


Musculoskeletal: Other (Deformities of foot, prior toe amputaitons, left foot 

wrapped, unable to fully examine, swelling of dorsum of Rt foot)


Integumentary: Other (As per above)


Neurological: Normal speech, Normal affect, Other (Moves ext spont)


Urinary: Suprapubic catheter


Laboratory Data (last 24 hrs)





01/23/23 16:47: Glucose 1519 H*


01/23/23 13:56: Sodium 131 L, Potassium 5.0, BUN 35 H, Creatinine 2.58 H, 

Glucose 1673 H*, Total Bilirubin 0.7, AST 8 L, ALT 30, Alkaline Phosphatase 168 

H, Lipase 4394 H


01/23/23 13:56: WBC 16.50 H, Hgb 18.0 H, Hct 54.5 H, Plt Count 307





Conclusions/Impression: 


A/P)





1. Abnormal results of kidney function studies. Stage II ALEXANDER in the setting of 

dehydration, volume depletion, DKA, apparent recent Bactrim use (as listed among

home meds) +/- other. Has evidence of some UOP in catheter bag but no initial 

improvement in Cr levels with fluids administered. Cont aggressive IVF 

hydration, avoid nephrotoxic meds/agents.


2. Severe hypernatremia with corrected Na as high as 158 or more in the setting 

of DKA, dehydration, other. Given some hypotension, potential infection and DKA,

will place on hypotonic IVF with 1/2 NS at a calculate rate of 200 cc/hr. Will 

monitor Na levels closely


3. Abnormal findings in urine, pyuria. Hx of chronic suprapubic catheter. No 

hydro or other disorders of the kidney and ureter noted on prior CT imaging. Hx 

of Proteus, Ecoli and Providencia on prior urine testing, only the latter was 

resistant to Rocephin. Will place on Rocephin IV 2 gm qd if pharmacy can verify 

that he does not have allergy or contraindication to it use, as with pt's 

abdominal pain and leukocytosis, pt may have a complicated UTI including 

possible pyelonephritis.


4. DKA management per primary team, positive serum and urine ketones on 

admission, gap present. BG remain > 300 on the latest.


5. Hypotension 2nd to hypovolemia, other -cont IVF hydration as mentioned above





Abdi Bryan MD, LUCY

## 2023-01-25 LAB
ALBUMIN SERPL BCP-MCNC: 2.8 G/DL (ref 3.4–5)
ALP SERPL-CCNC: 111 U/L (ref 45–117)
ALT SERPL W P-5'-P-CCNC: 29 U/L (ref 16–61)
AST SERPL W P-5'-P-CCNC: 21 U/L (ref 15–37)
BUN BLD-MCNC: 28 MG/DL (ref 7–18)
GLUCOSE SERPLBLD-MCNC: 431 MG/DL (ref 74–106)
HCT VFR BLD CALC: 46.2 % (ref 39.6–49)
LYMPHOCYTES # SPEC AUTO: 1.6 K/UL (ref 0.7–4.9)
MCV RBC: 85.9 FL (ref 80–100)
PMV BLD: 9.2 FL (ref 7.6–11.3)
POTASSIUM SERPL-SCNC: 4.3 MMOL/L (ref 3.5–5.1)
RBC # BLD: 5.38 M/UL (ref 4.33–5.43)

## 2023-01-25 RX ADMIN — HUMAN INSULIN SCH UNIT: 100 INJECTION, SOLUTION SUBCUTANEOUS at 22:04

## 2023-01-25 RX ADMIN — HYDROMORPHONE HYDROCHLORIDE PRN MG: 1 INJECTION, SOLUTION INTRAMUSCULAR; INTRAVENOUS; SUBCUTANEOUS at 02:25

## 2023-01-25 RX ADMIN — HYDROCODONE BITARTRATE AND ACETAMINOPHEN PRN TAB: 10; 325 TABLET ORAL at 17:00

## 2023-01-25 RX ADMIN — HYDROCODONE BITARTRATE AND ACETAMINOPHEN PRN TAB: 10; 325 TABLET ORAL at 22:03

## 2023-01-25 RX ADMIN — HUMAN INSULIN SCH UNIT: 100 INJECTION, SOLUTION SUBCUTANEOUS at 12:05

## 2023-01-25 RX ADMIN — SODIUM CHLORIDE SCH MLS: 0.45 INJECTION, SOLUTION INTRAVENOUS at 19:30

## 2023-01-25 RX ADMIN — SODIUM CHLORIDE SCH: 0.45 INJECTION, SOLUTION INTRAVENOUS at 16:37

## 2023-01-25 RX ADMIN — HUMAN INSULIN SCH UNIT: 100 INJECTION, SOLUTION SUBCUTANEOUS at 08:06

## 2023-01-25 RX ADMIN — HUMAN INSULIN SCH UNIT: 100 INJECTION, SOLUTION SUBCUTANEOUS at 16:59

## 2023-01-25 RX ADMIN — SODIUM CHLORIDE SCH MLS: 0.45 INJECTION, SOLUTION INTRAVENOUS at 12:06

## 2023-01-25 RX ADMIN — SODIUM CHLORIDE SCH MLS: 0.45 INJECTION, SOLUTION INTRAVENOUS at 05:24

## 2023-01-25 RX ADMIN — HYDROCODONE BITARTRATE AND ACETAMINOPHEN PRN TAB: 10; 325 TABLET ORAL at 08:24

## 2023-01-25 RX ADMIN — ENOXAPARIN SODIUM SCH MG: 40 INJECTION SUBCUTANEOUS at 17:00

## 2023-01-25 RX ADMIN — CEFTRIAXONE SCH MLS: 2 INJECTION, POWDER, FOR SOLUTION INTRAMUSCULAR; INTRAVENOUS at 07:52

## 2023-01-25 RX ADMIN — PANTOPRAZOLE SODIUM SCH MG: 40 TABLET, DELAYED RELEASE ORAL at 07:52

## 2023-01-25 RX ADMIN — SODIUM CHLORIDE SCH: 0.45 INJECTION, SOLUTION INTRAVENOUS at 00:37

## 2023-01-25 NOTE — P.PN
Nephrology note:


(S) Pt has been moved out of the ICU, BG back up > 400 this AM but off Insulin 

drip, has been initiated on basal/bolus Insulin. Remains on 1/2 NS IVF, has had 

excellent UOP. Not complaining of as much abd pain as yesterday in the ER





(O) Vitals reviewed in the EMR


General: Lethargic, NAD


HEENT: Atraumatic, Normocephalic, Sclerae nonicteric, NC


Neck: Supple, Other (Lt IJ CVC)


Respiratory: Other (B/l air entry, non tachypnec)


Cardiovascular: Other (Less tachy but mostly regular)


Gastrointestinal: Other (Soft, obese, some lower abd tenderness, no guarding, 

supra pubic catheter with leg bag)


Musculoskeletal: Other (Deformities of foot, prior toe amputaitons, left foot 

wrapped, unable to fully examine, swelling of dorsum of Rt foot)


Integumentary: Other (As per above)


Neurological: Lethargic but awakens easily, responds briefly, moves ext spont





Urinary: Suprapubic catheter


Laboratory Data (last 24 hrs)





Reviewed in the EMR





Conclusions/Impression: 


A/P)





1. Abnormal results of kidney function studies. Stage II ALEXANDER in the setting of 

dehydration, volume depletion, DKA, apparent recent Bactrim use (as listed among

home meds) +/- other. Renal function improving with aggressive IVF hydration, 

avoid nephrotoxic meds/agents. Monitor UOP, auto-diuresing post ALEXADNER and on IVF 

and with elevated BG


2. Severe hypernatremia with corrected Na as high as 158 or more yesterday in 

the setting of DKA, dehydration, other. BG remain elevated, avoiding dextrose 

hypotonic IVF, cont 1/2 NS at current rate.


3. Abnormal findings in urine, pyuria. Hx of chronic suprapubic catheter. No 

hydro or other disorders of the kidney and ureter noted on prior CT imaging. Hx 

of Proteus, Ecoli and Providencia on prior urine testing, only the latter was 

resistant to Rocephin. Did place on Rocephin IV 2 gm qd and UCx here are showing

now GNR growth.


4. DKA/hyperglycemia management per Dr. Mc. Gap closed, off insulin gtt.


5. Hypotension 2nd to hypovolemia, other -resolved.





Abdi Bryan MD, LUCY

## 2023-01-25 NOTE — P.PN
Subjective


Date of Service: 01/25/23


Primary Care Provider: Jesusita


Chief Complaint: DKA


Subjective: Improving (complainting of pain.  He states he takes 2 lortabs at 

home.  He has said this in the past.  Have called Dr. Lennon and found this not

the case)





Review of Systems


10-point ROS is otherwise unremarkable


General: Malaise





Physical Examination





- Vital Signs


Temperature: 98.5 F


Blood Pressure: 139/62


Pulse: 82


Respirations: 28


Pulse Ox (%): 96





- Physical Exam


General: Alert, In no apparent distress, Moderate distress


HEENT: Atraumatic, PERRLA, EOMI


Neck: Supple, JVD not distended


Respiratory: Clear to auscultation bilaterally, Normal air movement


Cardiovascular: Regular rate/rhythm, Normal S1 S2


Gastrointestinal: Normal bowel sounds, No tenderness


Musculoskeletal: No tenderness


Integumentary: No rashes


Neurological: Normal speech, Normal tone, Normal affect


Lymphatics: No axilla or inguinal lymphadenopathy





- Studies


Microbiology Data (last 24 hrs): 








01/23/23 16:10   Blood  - Blood   Blood Culture Gram Stain - Final


01/23/23 16:13   Blood  - Blood   Gram Stain - Final








Assessment And Plan





- Current Problems (Diagnosis)


(1) DKA, type 2


Current Visit: Yes   Status: Acute   


Plan: 


will admit the patient to the ICU.  Start him on insulin drip. 


1/25 


resolving.  Will move him to the floors.   He today states he dosen't really 

want to go to a nursing home to have regular insulin.  Will need diabetic 

education to see him as well as get some home health for the patient 


Qualifiers: 


   Diabetes mellitus complication detail: without coma   Qualified Code(s): 

E11.10 - Type 2 diabetes mellitus with ketoacidosis without coma   





(2) Acute kidney failure


Current Visit: Yes   Status: Acute   


Plan: 


most likely prerenal.  Will start aggressive fluids and monitor his creatine.  

His baseline is 0.6 to 1.  Will consult nephrology on this patient. 


1/24


creatine worsened.  He has some hypernatremia as well.  Agree with nephrology 

switching him to 1/2 N saline 


Qualifiers: 


   Acute renal failure type: unspecified   Qualified Code(s): N17.9 - Acute 

kidney failure, unspecified   





(3) Spina bifida


Onset Date: 11/03/17   Current Visit: No   Status: Chronic   


Plan: 


Patient is wheelchair bound.  Will keep monitoring him. He was actually healed 

of his ulcer a s few weeks ago. 


1/25


Will increase his lortab to 10/325 in house.   He was asking for more pain meds 

through out the night.  Mostly when he woke up.  Was sleeping comfortable 

throught the night.  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  Considering 

his quality of life this can be somewhat understandable 


Qualifiers: 


   Spinal region: lumbar   Presence of hydrocephalus: without hydrocephalus   

Qualified Code(s): Q05.7 - Lumbar spina bifida without hydrocephalus   


Discharge Plan: Home


Plan to discharge in: Greater than 2 days





- Code Status/Comfort Care


Code Status Assessed: No


Physician Review: Patient Assessed, Agree with Above Assessment and Plan


Critical Care: Yes


Time Spent Managing PTS Care (In Minutes): 25

## 2023-01-26 LAB
ALBUMIN SERPL BCP-MCNC: 2.7 G/DL (ref 3.4–5)
ALP SERPL-CCNC: 107 U/L (ref 45–117)
ALT SERPL W P-5'-P-CCNC: 25 U/L (ref 16–61)
AST SERPL W P-5'-P-CCNC: 9 U/L (ref 15–37)
BUN BLD-MCNC: 20 MG/DL (ref 7–18)
GLUCOSE SERPLBLD-MCNC: 438 MG/DL (ref 74–106)
HCT VFR BLD CALC: 45.8 % (ref 39.6–49)
LYMPHOCYTES # SPEC AUTO: 1.8 K/UL (ref 0.7–4.9)
MCV RBC: 86.9 FL (ref 80–100)
PMV BLD: 8.9 FL (ref 7.6–11.3)
POTASSIUM SERPL-SCNC: 4.2 MMOL/L (ref 3.5–5.1)
RBC # BLD: 5.27 M/UL (ref 4.33–5.43)

## 2023-01-26 RX ADMIN — ENOXAPARIN SODIUM SCH MG: 40 INJECTION SUBCUTANEOUS at 16:51

## 2023-01-26 RX ADMIN — Medication SCH PKT: at 22:05

## 2023-01-26 RX ADMIN — PANTOPRAZOLE SODIUM SCH MG: 40 TABLET, DELAYED RELEASE ORAL at 09:14

## 2023-01-26 RX ADMIN — HUMAN INSULIN SCH UNIT: 100 INJECTION, SOLUTION SUBCUTANEOUS at 09:23

## 2023-01-26 RX ADMIN — SODIUM CHLORIDE SCH MLS: 9 INJECTION, SOLUTION INTRAVENOUS at 10:30

## 2023-01-26 RX ADMIN — Medication SCH: at 21:00

## 2023-01-26 RX ADMIN — SODIUM CHLORIDE SCH MLS: 9 INJECTION, SOLUTION INTRAVENOUS at 23:59

## 2023-01-26 RX ADMIN — HUMAN INSULIN SCH UNIT: 100 INJECTION, SOLUTION SUBCUTANEOUS at 16:51

## 2023-01-26 RX ADMIN — HYDROCODONE BITARTRATE AND ACETAMINOPHEN PRN TAB: 10; 325 TABLET ORAL at 22:05

## 2023-01-26 RX ADMIN — SODIUM CHLORIDE SCH MLS: 9 INJECTION, SOLUTION INTRAVENOUS at 16:51

## 2023-01-26 RX ADMIN — HYDROCODONE BITARTRATE AND ACETAMINOPHEN PRN TAB: 10; 325 TABLET ORAL at 03:17

## 2023-01-26 RX ADMIN — SODIUM CHLORIDE SCH MLS: 0.45 INJECTION, SOLUTION INTRAVENOUS at 03:17

## 2023-01-26 RX ADMIN — HYDROCODONE BITARTRATE AND ACETAMINOPHEN PRN TAB: 10; 325 TABLET ORAL at 16:56

## 2023-01-26 RX ADMIN — SODIUM CHLORIDE SCH: 0.45 INJECTION, SOLUTION INTRAVENOUS at 08:37

## 2023-01-26 RX ADMIN — HYDROCODONE BITARTRATE AND ACETAMINOPHEN PRN TAB: 10; 325 TABLET ORAL at 09:13

## 2023-01-26 RX ADMIN — CEFTRIAXONE SCH MLS: 2 INJECTION, POWDER, FOR SOLUTION INTRAMUSCULAR; INTRAVENOUS at 09:14

## 2023-01-26 RX ADMIN — INSULIN GLARGINE SCH UNIT: 100 INJECTION, SOLUTION SUBCUTANEOUS at 09:13

## 2023-01-26 RX ADMIN — HUMAN INSULIN SCH UNIT: 100 INJECTION, SOLUTION SUBCUTANEOUS at 22:04

## 2023-01-26 RX ADMIN — HUMAN INSULIN SCH UNIT: 100 INJECTION, SOLUTION SUBCUTANEOUS at 12:34

## 2023-01-26 RX ADMIN — SODIUM CHLORIDE SCH: 0.45 INJECTION, SOLUTION INTRAVENOUS at 00:37

## 2023-01-26 NOTE — P.PN
Nephrology note:


(S) Pt appears to be doing better but still c/o generalized pain and reports 

Norco not helping. Reviewed positive cultures and discussed case with Dr. Mc





(O) Vitals reviewed in the EMR


General: Lethargic, NAD


HEENT: Atraumatic, Normocephalic, Sclerae nonicteric, NC


Neck: Supple, Other (Lt IJ CVC)


Respiratory: Other (B/l air entry, non tachypnec)


Cardiovascular: Other (Less tachy but mostly regular)


Gastrointestinal: Other (Soft, obese, some lower abd tenderness, no guarding, 

supra pubic catheter with leg bag)


Musculoskeletal: Other (Deformities of foot, prior toe amputaitons, left foot 

wrapped, unable to fully examine, swelling of dorsum of Rt foot)


Integumentary: Other (As per above)


Neurological: Less lethargic. awake, responsive, moves ext spont





Urinary: Suprapubic catheter


Laboratory Data (last 24 hrs)





Reviewed in the EMR





Conclusions/Impression: 


A/P)





1. Abnormal results of kidney function studies. Stage II ALEXANDER in the setting of 

dehydration, volume depletion, DKA, apparent recent Bactrim use (as listed among

home meds) +/- other. Renal function improved, ALEXANDER resolved. Monitor UOP, auto-

diuresing post ALEXANDER and on IVF and with elevated BG


2. Severe hypernatremia with corrected Na as high as 158 or more in the setting 

of DKA, dehydration, other. Now resolved. D/c 1/2 NS when current bag completes


3. Abnormal findings in urine, pyuria. Hx of chronic suprapubic catheter. No 

hydro or other disorders of the kidney and ureter noted on prior CT imaging. Hx 

of Proteus, Ecoli and Providencia on prior urine testing, only the latter was 

resistant to Rocephin. Did place on Rocephin IV 2 gm qd and UCx here did grow 

out MDR Providencia resistant to Rocephin. Discussed case with Dr. Mc, could 

have Urology exchange (infected/colonized) suprapubic catheter here and 

Meropenem is an Abx option.


4. DKA/hyperglycemia management per Dr. Mc. Gap closed, off insulin gtt but 

BG remain elevated.


5. Hypotension 2nd to hypovolemia, other -resolved. Pt does have methicillin 

resistant Staph epi bacteremia on peripheral BCx, will defer management to Dr. Mc.





Abdi Bryan MD, [facility]

## 2023-01-26 NOTE — P.CNS
Date of Consult: 01/26/23


37-year-old wheelchair-bound gentleman with spina bifida and hydrocephalus, 

IDDM, GERD, and paraplegia presented and admitted by Dr. Mc because of issues

with nausea, vomiting and diarrhea for 2 days.  He was admitted on 1/23/2023.  

Apparently he had stopped taking his insulin 4 days prior.  He was seen via the 

emergency department.  His sugars were in the 1600s according to the note.  Dr. Mc contacted me to exchange the suprapubic catheter because of concern for 

sepsis from a complicated UTI.  The patient has a suprapubic catheter.  He says 

he has the catheter exchanged somewhere between every 6 to 8 weeks at [facility] in 

[place].  He lives in [place], but he does not prefer going to the [facility] facility for some reason.  He says he would prefer to come to [place] for his care, but he has not been seen by me in over 2 years because 

initially I was not on his insurance plan.


He says he has had issues with Pseudomonas infections in the past associated 

with the suprapubic catheter.





Past medical history: Spina bifida with hydrocephalus, depression, insomnia, 

incontinence, GERD, chronic pain syndrome, muscle wasting, paraplegia





Past surgical history:  shunt, cholecystectomy, bilateral hip surgery, 

appendectomy





Family history negative for  malignancy





Social history positive for alcohol use but denies drug use





Examination:


Alert, awake, oriented x3 in no acute distress


No dyspnea or sign of respiratory distress


Morbidly obese and abdominal skin/pannus tender to soft/gentle palpation of 

uncertain etiology


Suprapubic catheter in place with purulent appearing urine draining into a leg 

bag attached to his right thigh region





1/23/2023 urine culture presumably from the catheter revealed procidentia 

sensitive to Bactrim, Zosyn, meropenem, and ceftazidime.  Intermediately 

sensitive to Unasyn, otherwise resistant.





1/23/2023 chest x-ray with left jugular central line placed with tip at the SVC 

junction.  No pneumothorax





Suprapubic catheter exchange procedure note:


The patient was placed flat in the supine position in the hospital bed.  His 

abdominal pannus was gently lifted and the indwelling suprapubic catheter 

balloon was deflated and it was removed.  A significant gush of urine came out 

the second the catheter was removed suggesting the catheter was indeed 

obstructed.  As a result, I prepped the suprapubic entry site with Betadine 

copiously and draped the area.  I passed a new 18 Telugu catheter into his 

bladder via the suprapubic site with ease, and there was drainage of relatively 

clear urine.  10 cc of sterile water was placed in the balloon, and I secured 

the catheter in a more healthy position with a StatLock at his right upper 

thigh.  He tolerated the procedure well and without obvious complication.





Assessment and recommendation:


37-year-old wheelchair-bound gentleman with spina bifida and hydrocephalus, 

IDDM, GERD, and paraplegia with chronic indwelling suprapubic catheter 

previously managed at [facility] but inconsistently, with recurrent UTIs with

highly resistant organisms.


-Suprapubic catheter exchanged today, 1/26/2023


-Recommend follow-up in the urology clinic within the next 4 to 6 weeks for 

cystoscopy to evaluate for bladder calculus underlying these recurrent 

infections


-Renal ultrasound to rule out upper tract calculus as the source of recurrent 

UTIs but also to rule out hydronephrosis from obstruction


-Otherwise, he should seek follow-up with his urologist at [facility] and 

ensure evaluation for these recurrent UTIs and a more routine suprapubic 

catheter exchanges, typically every 4 to 6 weeks.

## 2023-01-26 NOTE — P.PN
Subjective


Date of Service: 01/26/23


Primary Care Provider: Jesusita


Chief Complaint: DKA


Subjective: No new changes





Review of Systems


10-point ROS is otherwise unremarkable





Physical Examination





- Vital Signs


Temperature: 97.4 F


Blood Pressure: 143/93


Pulse: 96


Respirations: 18


Pulse Ox (%): 95





- Physical Exam


General: Alert, In no apparent distress


HEENT: Atraumatic, PERRLA, EOMI


Neck: Supple, JVD not distended


Respiratory: Clear to auscultation bilaterally, Normal air movement


Cardiovascular: Regular rate/rhythm, Normal S1 S2


Gastrointestinal: Normal bowel sounds, No tenderness


Musculoskeletal: No tenderness


Integumentary: No rashes


Neurological: Normal speech, Normal tone, Normal affect


Lymphatics: No axilla or inguinal lymphadenopathy





- Studies


Microbiology Data (last 24 hrs): 








01/23/23 13:22   Clean Catch Urine   Willis Count - Final


                            >100,000 CFU/ML.


01/23/23 13:22   Clean Catch Urine    - Final


                            Providencia Stuartii


01/23/23 16:13   Blood  - Blood   Aerobic Blood Culture - Final


                            Staph Epidermidis


01/23/23 16:13   Blood  - Blood   Anaerobic Blood Culture - Final


                            Staph Epidermidis


01/23/23 16:13   Blood  - Blood   Gram Stain - Final


01/23/23 16:10   Blood  - Blood   Aerobic Blood Culture - Final


                            Staph Epidermidis


01/23/23 16:10   Blood  - Blood   Blood Culture Gram Stain - Final


01/23/23 16:10   Blood  - Blood   Anaerobic Blood Culture - Final


                            Staph Epidermidis


01/23/23 16:10   Blood  - Blood   Gram Stain - Final








Assessment And Plan





- Current Problems (Diagnosis)


(1) DKA, type 2


Current Visit: Yes   Status: Acute   


Plan: 


will admit the patient to the ICU.  Start him on insulin drip. 


1/26


not well controlled.  Increase his insulin to 45 units.  Will cover him on a 

sliding scale.  If he improves will be able to discharge him home 


Qualifiers: 


   Diabetes mellitus complication detail: without coma   Qualified Code(s): 

E11.10 - Type 2 diabetes mellitus with ketoacidosis without coma   





(2) Acute kidney failure


Current Visit: Yes   Status: Acute   


Plan: 


most likely prerenal.  Will start aggressive fluids and monitor his creatine.  

His baseline is 0.6 to 1.  Will consult nephrology on this patient. 


1/24


creatine worsened.  He has some hypernatremia as well.  Agree with nephrology 

switching him to 1/2 N saline 


Qualifiers: 


   Acute renal failure type: unspecified   Qualified Code(s): N17.9 - Acute 

kidney failure, unspecified   





(3) Spina bifida


Onset Date: 11/03/17   Current Visit: No   Status: Chronic   


Plan: 


Patient is wheelchair bound.  Will keep monitoring him. He was actually healed 

of his ulcer a s few weeks ago. 


1/25


Will increase his lortab to 10/325 in house.   He was asking for more pain meds 

through out the night.  Mostly when he woke up.  Was sleeping comfortable 

throught the night.  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  Considering 

his quality of life this can be somewhat understandable 


Qualifiers: 


   Spinal region: lumbar   Presence of hydrocephalus: without hydrocephalus   

Qualified Code(s): Q05.7 - Lumbar spina bifida without hydrocephalus   


Discharge Plan: Home


Plan to discharge in: 24 Hours





- Code Status/Comfort Care


Code Status Assessed: No


Physician Review: Patient Assessed, Agree with Above Assessment and Plan


Critical Care: No


Time Spent Managing PTS Care (In Minutes): 20

## 2023-01-27 RX ADMIN — HUMAN INSULIN SCH UNIT: 100 INJECTION, SOLUTION SUBCUTANEOUS at 13:05

## 2023-01-27 RX ADMIN — INSULIN GLARGINE SCH UNIT: 100 INJECTION, SOLUTION SUBCUTANEOUS at 09:09

## 2023-01-27 RX ADMIN — HUMAN INSULIN SCH UNIT: 100 INJECTION, SOLUTION SUBCUTANEOUS at 16:02

## 2023-01-27 RX ADMIN — Medication SCH PKT: at 09:00

## 2023-01-27 RX ADMIN — SODIUM CHLORIDE SCH MLS: 9 INJECTION, SOLUTION INTRAVENOUS at 09:09

## 2023-01-27 RX ADMIN — SODIUM CHLORIDE SCH MLS: 9 INJECTION, SOLUTION INTRAVENOUS at 16:03

## 2023-01-27 RX ADMIN — ENOXAPARIN SODIUM SCH MG: 40 INJECTION SUBCUTANEOUS at 16:03

## 2023-01-27 RX ADMIN — HYDROCODONE BITARTRATE AND ACETAMINOPHEN PRN TAB: 10; 325 TABLET ORAL at 20:07

## 2023-01-27 RX ADMIN — PANTOPRAZOLE SODIUM SCH MG: 40 TABLET, DELAYED RELEASE ORAL at 09:08

## 2023-01-27 RX ADMIN — Medication SCH: at 20:06

## 2023-01-27 RX ADMIN — HUMAN INSULIN SCH UNIT: 100 INJECTION, SOLUTION SUBCUTANEOUS at 20:06

## 2023-01-27 RX ADMIN — HUMAN INSULIN SCH UNIT: 100 INJECTION, SOLUTION SUBCUTANEOUS at 09:09

## 2023-01-27 RX ADMIN — HYDROCODONE BITARTRATE AND ACETAMINOPHEN PRN TAB: 10; 325 TABLET ORAL at 06:16

## 2023-01-27 RX ADMIN — HYDROCODONE BITARTRATE AND ACETAMINOPHEN PRN TAB: 10; 325 TABLET ORAL at 13:05

## 2023-01-27 NOTE — P.PN
Subjective


Date of Service: 01/27/23


Primary Care Provider: Jesusita


Chief Complaint: DKA


Subjective: No new changes





Review of Systems


10-point ROS is otherwise unremarkable





Physical Examination





- Vital Signs


Temperature: 97.3 F


Blood Pressure: 99/54


Pulse: 98


Respirations: 16


Pulse Ox (%): 96





- Physical Exam


General: Alert, In no apparent distress


HEENT: Atraumatic, PERRLA, EOMI


Neck: Supple, JVD not distended


Respiratory: Clear to auscultation bilaterally, Normal air movement


Cardiovascular: Regular rate/rhythm, Normal S1 S2


Gastrointestinal: Normal bowel sounds, No tenderness


Musculoskeletal: No tenderness


Integumentary: No rashes


Neurological: Normal speech, Normal tone, Normal affect


Lymphatics: No axilla or inguinal lymphadenopathy





- Studies


Microbiology Data (last 24 hrs): 








01/23/23 13:22   Clean Catch Urine   [place] Count - Final


                            >100,000 CFU/ML.


01/23/23 13:22   Clean Catch Urine    - Final


                            Providencia Stuartii


01/23/23 16:13   Blood  - Blood   Aerobic Blood Culture - Final


                            Staph Epidermidis


01/23/23 16:13   Blood  - Blood   Anaerobic Blood Culture - Final


                            Staph Epidermidis


01/23/23 16:13   Blood  - Blood   Gram Stain - Final


01/23/23 16:10   Blood  - Blood   Aerobic Blood Culture - Final


                            Staph Epidermidis


01/23/23 16:10   Blood  - Blood   Blood Culture Gram Stain - Final


01/23/23 16:10   Blood  - Blood   Anaerobic Blood Culture - Final


                            Staph Epidermidis


01/23/23 16:10   Blood  - Blood   Gram Stain - Final








Assessment And Plan





- Current Problems (Diagnosis)


(1) DKA, type 2


Current Visit: Yes   Status: Acute   


Plan: 


will admit the patient to the ICU.  Start him on insulin drip. 


1/26


not well controlled.  Increase his insulin to 45 units.  Will cover him on a 

sliding scale.  If he improves will be able to discharge him home 


Qualifiers: 


   Diabetes mellitus complication detail: without coma   Qualified Code(s): 

E11.10 - Type 2 diabetes mellitus with ketoacidosis without coma   





(2) Acute kidney failure


Current Visit: Yes   Status: Acute   


Plan: 


most likely prerenal.  Will start aggressive fluids and monitor his creatine.  

His baseline is 0.6 to 1.  Will consult nephrology on this patient. 


1/24


creatine worsened.  He has some hypernatremia as well.  Agree with nephrology 

switching him to 1/2 N saline 


Qualifiers: 


   Acute renal failure type: unspecified   Qualified Code(s): N17.9 - Acute 

kidney failure, unspecified   





(3) Spina bifida


Onset Date: 11/03/17   Current Visit: No   Status: Chronic   


Plan: 


Patient is wheelchair bound.  Will keep monitoring him. He was actually healed 

of his ulcer a s few weeks ago. 


1/25


Will increase his lortab to 10/325 in house.   He was asking for more pain meds 

through out the night.  Mostly when he woke up.  Was sleeping comfortable 

throught the night.  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  Considering 

his quality of life this can be somewhat understandable 


Qualifiers: 


   Spinal region: lumbar   Presence of hydrocephalus: without hydrocephalus   

Qualified Code(s): Q05.7 - Lumbar spina bifida without hydrocephalus   





(4) Sepsis


Current Visit: Yes   Status: Acute   


Plan: 


he is unfortunately allergic to the po antibiotics.  will order a picc line and 

get him placed for meropenem and vancomycin treatment. Will need 10 days


Qualifiers: 


   Sepsis type: Streptococcus, other   Sepsis acute organ dysfunction status: 

without acute organ dysfunction   Qualified Code(s): A40.8 - Other streptococcal

sepsis   





(5) Complicated UTI (urinary tract infection)


Current Visit: No   Status: Acute   


Plan: 


provedencia  Requires meropenem





Physician Review: Patient Assessed, Agree with Above Assessment and Plan

## 2023-01-28 RX ADMIN — HYDROCODONE BITARTRATE AND ACETAMINOPHEN PRN TAB: 10; 325 TABLET ORAL at 21:23

## 2023-01-28 RX ADMIN — Medication SCH: at 09:00

## 2023-01-28 RX ADMIN — INSULIN GLARGINE SCH UNIT: 100 INJECTION, SOLUTION SUBCUTANEOUS at 09:33

## 2023-01-28 RX ADMIN — SODIUM CHLORIDE SCH MLS: 9 INJECTION, SOLUTION INTRAVENOUS at 16:00

## 2023-01-28 RX ADMIN — HUMAN INSULIN SCH UNIT: 100 INJECTION, SOLUTION SUBCUTANEOUS at 09:33

## 2023-01-28 RX ADMIN — HUMAN INSULIN SCH UNIT: 100 INJECTION, SOLUTION SUBCUTANEOUS at 15:59

## 2023-01-28 RX ADMIN — HYDROCODONE BITARTRATE AND ACETAMINOPHEN PRN TAB: 10; 325 TABLET ORAL at 16:04

## 2023-01-28 RX ADMIN — PANTOPRAZOLE SODIUM SCH MG: 40 TABLET, DELAYED RELEASE ORAL at 09:33

## 2023-01-28 RX ADMIN — HYDROCODONE BITARTRATE AND ACETAMINOPHEN PRN TAB: 10; 325 TABLET ORAL at 09:56

## 2023-01-28 RX ADMIN — SODIUM CHLORIDE SCH MLS: 9 INJECTION, SOLUTION INTRAVENOUS at 09:32

## 2023-01-28 RX ADMIN — ENOXAPARIN SODIUM SCH MG: 40 INJECTION SUBCUTANEOUS at 16:00

## 2023-01-28 RX ADMIN — Medication SCH: at 21:00

## 2023-01-28 RX ADMIN — SODIUM CHLORIDE SCH MLS: 9 INJECTION, SOLUTION INTRAVENOUS at 00:40

## 2023-01-28 RX ADMIN — HUMAN INSULIN SCH UNIT: 100 INJECTION, SOLUTION SUBCUTANEOUS at 21:25

## 2023-01-28 RX ADMIN — HYDROCODONE BITARTRATE AND ACETAMINOPHEN PRN TAB: 10; 325 TABLET ORAL at 02:04

## 2023-01-28 RX ADMIN — HUMAN INSULIN SCH UNIT: 100 INJECTION, SOLUTION SUBCUTANEOUS at 13:12

## 2023-01-28 NOTE — P.PN
Subjective


Date of Service: 01/28/23


Primary Care Provider: Jesusita


Chief Complaint: DKA


Subjective: No new changes





Review of Systems


10-point ROS is otherwise unremarkable





Physical Examination





- Vital Signs


Temperature: 97.1 F


Blood Pressure: 144/84


Pulse: 87


Respirations: 18


Pulse Ox (%): 95





- Physical Exam


General: Alert, In no apparent distress


HEENT: Atraumatic, PERRLA, EOMI


Neck: Supple, JVD not distended


Respiratory: Clear to auscultation bilaterally, Normal air movement


Cardiovascular: Regular rate/rhythm, Normal S1 S2


Gastrointestinal: Normal bowel sounds, No tenderness


Musculoskeletal: No tenderness


Integumentary: No rashes


Neurological: Normal speech, Normal tone, Normal affect


Lymphatics: No axilla or inguinal lymphadenopathy





Assessment And Plan





- Current Problems (Diagnosis)


(1) DKA, type 2


Current Visit: Yes   Status: Acute   


Plan: 


will admit the patient to the ICU.  Start him on insulin drip. 


1/26


not well controlled.  Increase his insulin to 45 units.  Will cover him on a 

sliding scale.  If he improves will be able to discharge him home 


Qualifiers: 


   Diabetes mellitus complication detail: without coma   Qualified Code(s): 

E11.10 - Type 2 diabetes mellitus with ketoacidosis without coma   





(2) Acute kidney failure


Current Visit: Yes   Status: Acute   


Plan: 


most likely prerenal.  Will start aggressive fluids and monitor his creatine.  

His baseline is 0.6 to 1.  Will consult nephrology on this patient. 


1/24


creatine worsened.  He has some hypernatremia as well.  Agree with nephrology 

switching him to 1/2 N saline 


Qualifiers: 


   Acute renal failure type: unspecified   Qualified Code(s): N17.9 - Acute 

kidney failure, unspecified   





(3) Spina bifida


Onset Date: 11/03/17   Current Visit: No   Status: Chronic   


Plan: 


Patient is wheelchair bound.  Will keep monitoring him. He was actually healed 

of his ulcer a s few weeks ago. 


1/25


Will increase his lortab to 10/325 in house.   He was asking for more pain meds 

through out the night.  Mostly when he woke up.  Was sleeping comfortable 

throught the night.  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  Considering 

his quality of life this can be somewhat understandable 


Qualifiers: 


   Spinal region: lumbar   Presence of hydrocephalus: without hydrocephalus   

Qualified Code(s): Q05.7 - Lumbar spina bifida without hydrocephalus   





(4) Sepsis


Current Visit: Yes   Status: Acute   


Plan: 


he is unfortunately allergic to the po antibiotics.  will order a picc line and 

get him placed for meropenem and vancomycin treatment. Will need 10 days


1/28


He is allergic to all the antibiotics that will treat the strep in his blood.  

He is clinically stable.  On meropenem for the UTI.  Will give him a few days of

this and then send him home if he is clinically stable


Qualifiers: 


   Sepsis type: Streptococcus, other   Sepsis acute organ dysfunction status: 

without acute organ dysfunction   Qualified Code(s): A40.8 - Other streptococcal

sepsis   





(5) Complicated UTI (urinary tract infection)


Current Visit: No   Status: Acute   


Plan: 


provedencia  Requires meropenem





Discharge Plan: Home


Plan to discharge in: 72 Hours





- Code Status/Comfort Care


Code Status Assessed: No


Physician Review: Patient Assessed, Agree with Above Assessment and Plan


Critical Care: No


Time Spent Managing PTS Care (In Minutes): 20

## 2023-01-29 RX ADMIN — SODIUM CHLORIDE SCH MLS: 9 INJECTION, SOLUTION INTRAVENOUS at 16:18

## 2023-01-29 RX ADMIN — SODIUM CHLORIDE SCH MLS: 9 INJECTION, SOLUTION INTRAVENOUS at 09:00

## 2023-01-29 RX ADMIN — SODIUM CHLORIDE SCH MLS: 0.9 INJECTION, SOLUTION INTRAVENOUS at 12:16

## 2023-01-29 RX ADMIN — HUMAN INSULIN SCH UNIT: 100 INJECTION, SOLUTION SUBCUTANEOUS at 16:41

## 2023-01-29 RX ADMIN — HUMAN INSULIN SCH UNIT: 100 INJECTION, SOLUTION SUBCUTANEOUS at 22:40

## 2023-01-29 RX ADMIN — HYDROMORPHONE HYDROCHLORIDE PRN MG: 1 INJECTION, SOLUTION INTRAMUSCULAR; INTRAVENOUS; SUBCUTANEOUS at 12:17

## 2023-01-29 RX ADMIN — ENOXAPARIN SODIUM SCH MG: 40 INJECTION SUBCUTANEOUS at 16:18

## 2023-01-29 RX ADMIN — Medication SCH PKT: at 20:09

## 2023-01-29 RX ADMIN — HUMAN INSULIN SCH UNIT: 100 INJECTION, SOLUTION SUBCUTANEOUS at 21:05

## 2023-01-29 RX ADMIN — HYDROMORPHONE HYDROCHLORIDE PRN MG: 1 INJECTION, SOLUTION INTRAMUSCULAR; INTRAVENOUS; SUBCUTANEOUS at 16:17

## 2023-01-29 RX ADMIN — HYDROCODONE BITARTRATE AND ACETAMINOPHEN PRN TAB: 10; 325 TABLET ORAL at 09:00

## 2023-01-29 RX ADMIN — Medication SCH: at 09:00

## 2023-01-29 RX ADMIN — INSULIN GLARGINE SCH UNIT: 100 INJECTION, SOLUTION SUBCUTANEOUS at 09:01

## 2023-01-29 RX ADMIN — HUMAN INSULIN SCH UNIT: 100 INJECTION, SOLUTION SUBCUTANEOUS at 07:53

## 2023-01-29 RX ADMIN — HYDROMORPHONE HYDROCHLORIDE PRN MG: 1 INJECTION, SOLUTION INTRAMUSCULAR; INTRAVENOUS; SUBCUTANEOUS at 20:08

## 2023-01-29 RX ADMIN — SODIUM CHLORIDE SCH: 0.9 INJECTION, SOLUTION INTRAVENOUS at 14:00

## 2023-01-29 RX ADMIN — HUMAN INSULIN SCH UNIT: 100 INJECTION, SOLUTION SUBCUTANEOUS at 22:39

## 2023-01-29 RX ADMIN — HYDROMORPHONE HYDROCHLORIDE PRN MG: 1 INJECTION, SOLUTION INTRAMUSCULAR; INTRAVENOUS; SUBCUTANEOUS at 23:56

## 2023-01-29 RX ADMIN — SODIUM CHLORIDE SCH MLS: 9 INJECTION, SOLUTION INTRAVENOUS at 01:06

## 2023-01-29 RX ADMIN — HUMAN INSULIN SCH UNIT: 100 INJECTION, SOLUTION SUBCUTANEOUS at 12:17

## 2023-01-29 RX ADMIN — PANTOPRAZOLE SODIUM SCH MG: 40 TABLET, DELAYED RELEASE ORAL at 08:59

## 2023-01-29 RX ADMIN — HYDROCODONE BITARTRATE AND ACETAMINOPHEN PRN TAB: 10; 325 TABLET ORAL at 02:30

## 2023-01-29 NOTE — P.PN
Subjective


Date of Service: 01/29/23


Primary Care Provider: Jesusita


Chief Complaint: DKA





blood sugars elevated.  Nursing took his muffins. Redd has a history of 

difficulty with a diabetic diet. 





Review of Systems


10-point ROS is otherwise unremarkable





Physical Examination





- Vital Signs


Temperature: 97.3 F


Blood Pressure: 96/57


Pulse: 91


Respirations: 18


Pulse Ox (%): 91





- Physical Exam


General: Alert, In no apparent distress


HEENT: Atraumatic, PERRLA, EOMI


Neck: Supple, JVD not distended


Respiratory: Clear to auscultation bilaterally, Normal air movement


Cardiovascular: Regular rate/rhythm, Normal S1 S2


Gastrointestinal: Normal bowel sounds, No tenderness


Musculoskeletal: No tenderness


Integumentary: No rashes


Neurological: Normal speech, Normal tone, Normal affect


Lymphatics: No axilla or inguinal lymphadenopathy





Assessment And Plan





- Current Problems (Diagnosis)


(1) DKA, type 2


Current Visit: Yes   Status: Acute   


Plan: 


will admit the patient to the ICU.  Start him on insulin drip. 


1/26


not well controlled.  Increase his insulin to 45 units.  Will cover him on a 

sliding scale.  If he improves will be able to discharge him home 


Qualifiers: 


   Diabetes mellitus complication detail: without coma   Qualified Code(s): 

E11.10 - Type 2 diabetes mellitus with ketoacidosis without coma   





(2) Acute kidney failure


Current Visit: Yes   Status: Acute   


Plan: 


most likely prerenal.  Will start aggressive fluids and monitor his creatine.  

His baseline is 0.6 to 1.  Will consult nephrology on this patient. 


1/24


creatine worsened.  He has some hypernatremia as well.  Agree with nephrology 

switching him to 1/2 N saline 


Qualifiers: 


   Acute renal failure type: unspecified   Qualified Code(s): N17.9 - Acute 

kidney failure, unspecified   





(3) Spina bifida


Onset Date: 11/03/17   Current Visit: No   Status: Chronic   


Plan: 


Patient is wheelchair bound.  Will keep monitoring him. He was actually healed 

of his ulcer a s few weeks ago. 


1/25


Will increase his lortab to 10/325 in house.   He was asking for more pain meds 

through out the night.  Mostly when he woke up.  Was sleeping comfortable 

throught the night.  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  Considering 

his quality of life this can be somewhat understandable 


Qualifiers: 


   Spinal region: lumbar   Presence of hydrocephalus: without hydrocephalus   Q

ualified Code(s): Q05.7 - Lumbar spina bifida without hydrocephalus   





(4) Sepsis


Current Visit: Yes   Status: Acute   


Plan: 


he is unfortunately allergic to the po antibiotics.  will order a picc line and 

get him placed for meropenem and vancomycin treatment. Will need 10 days


1/28


He is allergic to all the antibiotics that will treat the strep in his blood.  

He is clinically stable.  On meropenem for the UTI.  Will give him a few days of

this and then send him home if he is clinically stable


Qualifiers: 


   Sepsis type: Streptococcus, other   Sepsis acute organ dysfunction status: 

without acute organ dysfunction   Qualified Code(s): A40.8 - Other streptococcal

sepsis   





(5) Complicated UTI (urinary tract infection)


Current Visit: No   Status: Acute   


Plan: 


provedencia  Requires meropenem





Discharge Plan: Home


Plan to discharge in: 24 Hours





- Code Status/Comfort Care


Code Status Assessed: No


Physician Review: Patient Assessed, Agree with Above Assessment and Plan


Critical Care: No


Time Spent Managing PTS Care (In Minutes): 20

## 2023-01-30 VITALS — DIASTOLIC BLOOD PRESSURE: 58 MMHG | TEMPERATURE: 97 F | SYSTOLIC BLOOD PRESSURE: 96 MMHG

## 2023-01-30 VITALS — OXYGEN SATURATION: 95 %

## 2023-01-30 LAB
ALBUMIN SERPL BCP-MCNC: 2.8 G/DL (ref 3.4–5)
ALP SERPL-CCNC: 91 U/L (ref 45–117)
ALT SERPL W P-5'-P-CCNC: 33 U/L (ref 16–61)
AST SERPL W P-5'-P-CCNC: 21 U/L (ref 15–37)
BUN BLD-MCNC: 20 MG/DL (ref 7–18)
GLUCOSE SERPLBLD-MCNC: 371 MG/DL (ref 74–106)
HCT VFR BLD CALC: 43.7 % (ref 39.6–49)
LYMPHOCYTES # SPEC AUTO: 2.3 K/UL (ref 0.7–4.9)
MCV RBC: 86.3 FL (ref 80–100)
PMV BLD: 8.8 FL (ref 7.6–11.3)
POTASSIUM SERPL-SCNC: 4 MMOL/L (ref 3.5–5.1)
RBC # BLD: 5.07 M/UL (ref 4.33–5.43)

## 2023-01-30 RX ADMIN — HUMAN INSULIN SCH UNIT: 100 INJECTION, SOLUTION SUBCUTANEOUS at 11:30

## 2023-01-30 RX ADMIN — PANTOPRAZOLE SODIUM SCH MG: 40 TABLET, DELAYED RELEASE ORAL at 07:30

## 2023-01-30 RX ADMIN — SODIUM CHLORIDE SCH MLS: 9 INJECTION, SOLUTION INTRAVENOUS at 08:17

## 2023-01-30 RX ADMIN — SODIUM CHLORIDE SCH: 0.9 INJECTION, SOLUTION INTRAVENOUS at 04:00

## 2023-01-30 RX ADMIN — INSULIN GLARGINE SCH UNIT: 100 INJECTION, SOLUTION SUBCUTANEOUS at 08:18

## 2023-01-30 RX ADMIN — SODIUM CHLORIDE SCH MLS: 9 INJECTION, SOLUTION INTRAVENOUS at 00:36

## 2023-01-30 RX ADMIN — HYDROMORPHONE HYDROCHLORIDE PRN MG: 1 INJECTION, SOLUTION INTRAMUSCULAR; INTRAVENOUS; SUBCUTANEOUS at 03:53

## 2023-01-30 RX ADMIN — HYDROMORPHONE HYDROCHLORIDE PRN MG: 1 INJECTION, SOLUTION INTRAMUSCULAR; INTRAVENOUS; SUBCUTANEOUS at 08:17

## 2023-01-30 RX ADMIN — Medication SCH: at 08:18

## 2023-01-30 NOTE — P.DS
Admission Date: 01/23/23


Discharge Date: 01/30/23


Primary Care Provider: Jesusita


Disposition: ROUTINE DISCHARGE


Discharge Condition: GOOD


Reason for Admission: DKA





- Problems


(1) DKA, type 2


Current Visit: Yes   Status: Acute   


Qualifiers: 


   Diabetes mellitus complication detail: without coma   Qualified Code(s): 

E11.10 - Type 2 diabetes mellitus with ketoacidosis without coma   





(2) Acute kidney failure


Current Visit: Yes   Status: Acute   


Qualifiers: 


   Acute renal failure type: unspecified   Qualified Code(s): N17.9 - Acute 

kidney failure, unspecified   





(3) Spina bifida


Onset Date: 11/03/17   Current Visit: No   Status: Chronic   


Qualifiers: 


   Spinal region: lumbar   Presence of hydrocephalus: without hydrocephalus   

Qualified Code(s): Q05.7 - Lumbar spina bifida without hydrocephalus   





(4) Sepsis


Current Visit: Yes   Status: Acute   


Qualifiers: 


   Sepsis type: Streptococcus, other   Sepsis acute organ dysfunction status: 

without acute organ dysfunction   Qualified Code(s): A40.8 - Other streptococcal

sepsis   





(5) Complicated UTI (urinary tract infection)


Current Visit: No   Status: Acute   


Brief History of Present Illness: 





Patient is an office patient of mine.  he has a history of spina bifida and is 

wheelchair bound.  the patient also is diabetic.   He stopped taking his insulin

4 days ago.   Started getting nausea, vomitting and diarrhea for the last 2 

days.  His mother usually gives the insulin.  However she was going to [employer] 

services and not able to make it over to his house to give the insulin.  He was 

brought to the ER.  Mild gap. However his sugars were in the 1600's 


Hospital Course: 





Patient presented with DKA.  He was not taking his insulin again.   DKA 

resolved.  He was transfered to the floor.  Had a mdr uti.   Also 2 positive 

blood cultures.  Unfortunately he is allergic to all the antibiotics he was 

sensitive to.  Was on meropenem for 4 days for the UTI.  The patient had no 

clinical signs of infection.  Most likely he is colonized and his immune system 

is taking care of the antibiotic.  Will discharge him without antibiotics.  He 

had difficult to control sugars.  This was mostly due to him getting outside f

ood.   Will keep discussing his care and glucose control. 


Vital Signs/Physical Exam: 














Temp Pulse Resp BP Pulse Ox


 


 97.0 F   87   18   134/75   92 


 


 01/30/23 04:00  01/30/23 04:00  01/30/23 04:23  01/30/23 04:00  01/30/23 04:23








General: Alert, In no apparent distress


HEENT: Atraumatic, PERRLA, EOMI


Neck: Supple, JVD not distended


Respiratory: Clear to auscultation bilaterally, Normal air movement


Cardiovascular: Regular rate/rhythm, Normal S1 S2


Gastrointestinal: Normal bowel sounds, No tenderness


Musculoskeletal: No tenderness


Integumentary: No rashes


Neurological: Normal speech, Normal tone, Normal affect


Lymphatics: No axilla or inguinal lymphadenopathy


Laboratory Data at Discharge: 














WBC  8.20 K/uL (4.3-10.9)   01/30/23  04:45    


 


Hgb  14.5 g/dL (13.6-17.9)   01/30/23  04:45    


 


Hct  43.7 % (39.6-49.0)   01/30/23  04:45    


 


Plt Count  261 K/uL (152-406)   01/30/23  04:45    


 


Sodium  136 mmol/L (136-145)   01/30/23  04:45    


 


Potassium  4.0 mmol/L (3.5-5.1)   01/30/23  04:45    


 


BUN  20 mg/dL (7-18)  H  01/30/23  04:45    


 


Creatinine  0.80 mg/dL (0.70-1.30)   01/30/23  04:45    


 


Glucose  371 mg/dL ()  H  01/30/23  04:45    


 


Total Bilirubin  0.5 mg/dL (0.2-1.0)   01/30/23  04:45    


 


AST  21 U/L (15-37)   01/30/23  04:45    


 


ALT  33 U/L (16-61)   01/30/23  04:45    


 


Alkaline Phosphatase  91 U/L ()   01/30/23  04:45    


 


Triglycerides  236 mg/dL (<150)  H  01/24/23  05:05    


 


Cholesterol  176 mg/dL (<200)   01/24/23  05:05    


 


HDL Cholesterol  34 mg/dL (40-60)  L  01/24/23  05:05    


 


Cholesterol/HDL Ratio  5.18   01/24/23  05:05    


 


Lipase  4394 U/L ()  H  01/23/23  13:56    








Home Medications: 








Hydromorphone [Dilaudid] 4 mg PO Q8HP PRN 03/14/22 


Lisinopril [Zestril] 2.5 mg PO DAILY 01/24/23 


Insulin Glargine,Hum.rec.anlog [Semglee] 45 unit SQ DAILY 01/30/23 





Diet: ADA


Activity: Fall precautions


Followup: 


Domingo Mc MD [Primary Care Provider] - 


Time spent managing pt's care (in minutes): 30

## 2023-05-03 ENCOUNTER — HOSPITAL ENCOUNTER (INPATIENT)
Dept: HOSPITAL 97 - ER | Age: 38
LOS: 21 days | Discharge: HOME | DRG: 628 | End: 2023-05-24
Attending: EMERGENCY MEDICINE | Admitting: INTERNAL MEDICINE
Payer: COMMERCIAL

## 2023-05-03 VITALS — BODY MASS INDEX: 52 KG/M2

## 2023-05-03 DIAGNOSIS — N32.89: ICD-10-CM

## 2023-05-03 DIAGNOSIS — E11.621: ICD-10-CM

## 2023-05-03 DIAGNOSIS — Z78.1: ICD-10-CM

## 2023-05-03 DIAGNOSIS — F17.200: ICD-10-CM

## 2023-05-03 DIAGNOSIS — Z99.3: ICD-10-CM

## 2023-05-03 DIAGNOSIS — E11.00: Primary | ICD-10-CM

## 2023-05-03 DIAGNOSIS — T83.510A: ICD-10-CM

## 2023-05-03 DIAGNOSIS — Z53.31: ICD-10-CM

## 2023-05-03 DIAGNOSIS — E66.01: ICD-10-CM
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
## 2023-05-03 DIAGNOSIS — T38.3X6A: ICD-10-CM

## 2023-05-03 DIAGNOSIS — Z91.128: ICD-10-CM

## 2023-05-03 DIAGNOSIS — Z79.4: ICD-10-CM

## 2023-05-03 DIAGNOSIS — D64.9: ICD-10-CM

## 2023-05-03 DIAGNOSIS — R57.8: ICD-10-CM

## 2023-05-03 DIAGNOSIS — L89.899: ICD-10-CM

## 2023-05-03 DIAGNOSIS — N30.90: ICD-10-CM

## 2023-05-03 DIAGNOSIS — Z79.899: ICD-10-CM

## 2023-05-03 DIAGNOSIS — N50.9: ICD-10-CM

## 2023-05-03 DIAGNOSIS — Z91.51: ICD-10-CM

## 2023-05-03 DIAGNOSIS — Z91.148: ICD-10-CM

## 2023-05-03 DIAGNOSIS — G89.4: ICD-10-CM

## 2023-05-03 DIAGNOSIS — Z88.5: ICD-10-CM

## 2023-05-03 DIAGNOSIS — R15.9: ICD-10-CM

## 2023-05-03 DIAGNOSIS — Z88.1: ICD-10-CM

## 2023-05-03 DIAGNOSIS — L97.509: ICD-10-CM

## 2023-05-03 DIAGNOSIS — K21.9: ICD-10-CM

## 2023-05-03 DIAGNOSIS — Q05.9: ICD-10-CM

## 2023-05-03 DIAGNOSIS — F33.9: ICD-10-CM

## 2023-05-03 DIAGNOSIS — R15.1: ICD-10-CM

## 2023-05-03 DIAGNOSIS — I10: ICD-10-CM

## 2023-05-03 DIAGNOSIS — J96.90: ICD-10-CM

## 2023-05-03 DIAGNOSIS — J45.909: ICD-10-CM

## 2023-05-03 DIAGNOSIS — Z88.8: ICD-10-CM

## 2023-05-03 DIAGNOSIS — Z90.49: ICD-10-CM

## 2023-05-03 DIAGNOSIS — Z88.0: ICD-10-CM

## 2023-05-03 LAB
BUN BLD-MCNC: 50 MG/DL (ref 7–18)
GLUCOSE SERPLBLD-MCNC: 960 MG/DL (ref 74–106)
HCT VFR BLD CALC: 45.9 % (ref 39.6–49)
LYMPHOCYTES # SPEC AUTO: 0.6 K/UL (ref 0.7–4.9)
MCV RBC: 90.7 FL (ref 80–100)
PMV BLD: 8.8 FL (ref 7.6–11.3)
POTASSIUM SERPL-SCNC: 5 MEQ/L (ref 3.5–5.1)
RBC # BLD: 5.06 M/UL (ref 4.33–5.43)
TROPONIN I SERPL HS-MCNC: < 3 PG/ML (ref ?–58.9)

## 2023-05-03 PROCEDURE — 86920 COMPATIBILITY TEST SPIN: CPT

## 2023-05-03 PROCEDURE — 85384 FIBRINOGEN ACTIVITY: CPT

## 2023-05-03 PROCEDURE — 83880 ASSAY OF NATRIURETIC PEPTIDE: CPT

## 2023-05-03 PROCEDURE — 94760 N-INVAS EAR/PLS OXIMETRY 1: CPT

## 2023-05-03 PROCEDURE — 74176 CT ABD & PELVIS W/O CONTRAST: CPT

## 2023-05-03 PROCEDURE — 82533 TOTAL CORTISOL: CPT

## 2023-05-03 PROCEDURE — 87086 URINE CULTURE/COLONY COUNT: CPT

## 2023-05-03 PROCEDURE — 86901 BLOOD TYPING SEROLOGIC RH(D): CPT

## 2023-05-03 PROCEDURE — 82805 BLOOD GASES W/O2 SATURATION: CPT

## 2023-05-03 PROCEDURE — 81001 URINALYSIS AUTO W/SCOPE: CPT

## 2023-05-03 PROCEDURE — 80048 BASIC METABOLIC PNL TOTAL CA: CPT

## 2023-05-03 PROCEDURE — 80053 COMPREHEN METABOLIC PANEL: CPT

## 2023-05-03 PROCEDURE — 82947 ASSAY GLUCOSE BLOOD QUANT: CPT

## 2023-05-03 PROCEDURE — 85730 THROMBOPLASTIN TIME PARTIAL: CPT

## 2023-05-03 PROCEDURE — 85014 HEMATOCRIT: CPT

## 2023-05-03 PROCEDURE — 99285 EMERGENCY DEPT VISIT HI MDM: CPT

## 2023-05-03 PROCEDURE — 84484 ASSAY OF TROPONIN QUANT: CPT

## 2023-05-03 PROCEDURE — 36415 COLL VENOUS BLD VENIPUNCTURE: CPT

## 2023-05-03 PROCEDURE — 85379 FIBRIN DEGRADATION QUANT: CPT

## 2023-05-03 PROCEDURE — 93005 ELECTROCARDIOGRAM TRACING: CPT

## 2023-05-03 PROCEDURE — 93306 TTE W/DOPPLER COMPLETE: CPT

## 2023-05-03 PROCEDURE — 71275 CT ANGIOGRAPHY CHEST: CPT

## 2023-05-03 PROCEDURE — 85018 HEMOGLOBIN: CPT

## 2023-05-03 PROCEDURE — 82271 OCCULT BLOOD OTHER SOURCES: CPT

## 2023-05-03 PROCEDURE — 83735 ASSAY OF MAGNESIUM: CPT

## 2023-05-03 PROCEDURE — 87088 URINE BACTERIA CULTURE: CPT

## 2023-05-03 PROCEDURE — 84100 ASSAY OF PHOSPHORUS: CPT

## 2023-05-03 PROCEDURE — 87077 CULTURE AEROBIC IDENTIFY: CPT

## 2023-05-03 PROCEDURE — 86850 RBC ANTIBODY SCREEN: CPT

## 2023-05-03 PROCEDURE — 94002 VENT MGMT INPAT INIT DAY: CPT

## 2023-05-03 PROCEDURE — 87186 SC STD MICRODIL/AGAR DIL: CPT

## 2023-05-03 PROCEDURE — 83605 ASSAY OF LACTIC ACID: CPT

## 2023-05-03 PROCEDURE — 83986 ASSAY PH BODY FLUID NOS: CPT

## 2023-05-03 PROCEDURE — 71045 X-RAY EXAM CHEST 1 VIEW: CPT

## 2023-05-03 PROCEDURE — 96375 TX/PRO/DX INJ NEW DRUG ADDON: CPT

## 2023-05-03 PROCEDURE — 94003 VENT MGMT INPAT SUBQ DAY: CPT

## 2023-05-03 PROCEDURE — 86900 BLOOD TYPING SEROLOGIC ABO: CPT

## 2023-05-03 PROCEDURE — 87040 BLOOD CULTURE FOR BACTERIA: CPT

## 2023-05-03 PROCEDURE — 36430 TRANSFUSION BLD/BLD COMPNT: CPT

## 2023-05-03 PROCEDURE — 94010 BREATHING CAPACITY TEST: CPT

## 2023-05-03 PROCEDURE — 96374 THER/PROPH/DIAG INJ IV PUSH: CPT

## 2023-05-03 PROCEDURE — 85610 PROTHROMBIN TIME: CPT

## 2023-05-03 PROCEDURE — 85025 COMPLETE CBC W/AUTO DIFF WBC: CPT

## 2023-05-03 PROCEDURE — 93970 EXTREMITY STUDY: CPT

## 2023-05-03 RX ADMIN — SODIUM CHLORIDE SCH MLS: 0.9 INJECTION, SOLUTION INTRAVENOUS at 23:42

## 2023-05-03 NOTE — ER
Nurse's Notes                                                                                     

 [facility]                                                                 

Name: Redd Dale Jr                                                                            

Age: 37 yrs                                                                                       

Sex: Male                                                                                         

: 1985                                                                                   

MRN: [id number]                                                                                   

Arrival Date: 2023                                                                          

Time: 14:25                                                                                       

Account#: [id number]                                                                            

Bed 15                                                                                            

Private MD:                                                                                       

Diagnosis: Other specified diabetes mellitus with hyperosmolarity without nonketotic              

  hyperglycemic-hyperosmolar coma ([facility]);UTI/ Urinary tract infection, site not                  

  specified                                                                                       

                                                                                                  

Presentation:                                                                                     

                                                                                             

14:37 Chief complaint: EMS states: chest pain for 1 day, abdominal pain for 4 days, EMS       3 

      reports , pt reports unable to eat or drink for past 84 hours, EMS found 2 empty     

      Elizabeth cans under pt at home, pt reports using 3L O2 NC at home for past 3-4 days, pt        

      missed wound care appointment this week. Coronavirus screen: Vaccine status: Patient        

      reports receiving the 2nd dose of the covid vaccine. Ebola Screen: No symptoms or risks     

      identified at this time. Initial Sepsis Screen: Does the patient meet any 2 criteria?       

      No. Patient's initial sepsis screen is negative. Does the patient have a suspected          

      source of infection? No. Patient's initial sepsis screen is negative. Risk Assessment:      

      Do you want to hurt yourself or someone else?. Onset of symptoms was May 03, 2023.          

14:37 Method Of Arrival: EMS: [place] [id number] 

14:37 Acuity: AYESHA 3                                                                           eh3 

                                                                                                  

Triage Assessment:                                                                                

14:43 General: Appears in no apparent distress. uncomfortable, obese, unkempt, Behavior is    eh3 

      calm, cooperative, appropriate for age. Pain: Complains of pain in mid-sternal area         

      Pain does not radiate. Pain currently is 10 out of 10 on a pain scale. EENT: No signs       

      and/or symptoms were reported regarding the EENT system. Neuro: Level of Consciousness      

      is awake, alert, obeys commands, Oriented to person, place, time, situation.                

      Cardiovascular: Capillary refill < 3 seconds Patient's skin is warm and dry.                

      Respiratory: Airway is patent Respiratory effort is even, labored, Respiratory pattern      

      is regular, symmetrical. GI: Abdomen is round Reports lower abdominal pain, upper           

      abdominal pain, intolerance of fluids, intolerance of food, nausea. : No signs and/or     

      symptoms were reported regarding the genitourinary system. Ortega in place. Derm: No         

      signs and/or symptoms reported regarding the dermatologic system. Musculoskeletal: No       

      signs and/or symptoms reported regarding the musculoskeletal system.                        

                                                                                                  

Historical:                                                                                       

- Allergies:                                                                                      

14:43 Amoxicillin;                                                                            eh3 

14:43 Bactrim;                                                                                eh3 

14:43 Ciprofloxacin;                                                                          eh3 

14:43 CLAVULANIC ACID;                                                                        eh3 

14:43 Demerol;                                                                                eh3 

14:43 Doxycycline;                                                                            eh3 

14:43 Levofloxacin;                                                                           eh3 

14:43 Morphine;                                                                               eh3 

14:43 PENICILLINS;                                                                            eh3 

14:43 Toradol;                                                                                eh3 

14:43 TRIMETHOPRIM;                                                                           eh3 

14:43 Vancomycin;                                                                             eh3 

14:43 Zofran;                                                                                 eh3 

- Home Meds:                                                                                      

14:43 Dilaudid [Active]; Phenergan Oral [Active]; Insulin: Regular Sub-Q [Active];            eh3 

- PMHx:                                                                                           

14:43 Asthma; Cerebral Palsy; cluster headaches; decubitus ulcers on feet; diabetes mellitus; eh3 

      GERD; Hydrocephalus; Hypertension; spina bifida;                                            

- PSHx:                                                                                           

14:43 Cholecystectomy; Shunt Revision;                                                        eh3 

                                                                                                  

- Immunization history:: Adult Immunizations up to date.                                          

- Social history:: Smoking status: unknown.                                                       

                                                                                                  

                                                                                                  

Screenin:46 [facility] ED Fall Risk Assessment (Adult) Score/Fall Risk Level 0 - 2 = Low Risk. Abuse  eh3 

      screen: Denies threats or abuse. Denies injuries from another. Nutritional screening:       

      No deficits noted. Tuberculosis screening: No symptoms or risk factors identified.          

                                                                                                  

Assessment:                                                                                       

14:46 Reassessment: No changes from previously documented assessment. See triage assessment.  eh3 

      Pain: Pain began 1 day ago.                                                                 

14:46 Pain: Complains of pain in mid-sternal area Pain does not radiate. Pain currently is 10 eh3 

      out of 10 on a pain scale.                                                                  

15:00 Reassessment: Patient and/or family updated on plan of care and expected duration. Pain eh3 

      level reassessed. Patient is alert, oriented x 3, equal unlabored respirations, skin        

      warm/dry/pink.                                                                              

16:00 Reassessment: Patient and/or family updated on plan of care and expected duration. Pain eh3 

      level reassessed. Patient is alert, oriented x 3, equal unlabored respirations, skin        

      warm/dry/pink. Patient states symptoms have not improved.                                   

17:00 Reassessment: Patient and/or family updated on plan of care and expected duration. Pain eh3 

      level reassessed. Patient is alert, oriented x 3, equal unlabored respirations, skin        

      warm/dry/pink.                                                                              

18:00 Reassessment: Patient and/or family updated on plan of care and expected duration. Pain eh3 

      level reassessed. Patient is alert, oriented x 3, equal unlabored respirations, skin        

      warm/dry/pink.                                                                              

19:45 General: Appears comfortable, Behavior is calm, cooperative. Pain: Complains of pain in ha1 

      sacrum Pain does not radiate. Pain currently is 6 out of 10 on a pain scale. Quality of     

      pain is described as throbbing, Alleviated by medications. Neuro: Level of                  

      Consciousness is awake, alert, obeys commands, Oriented to person, place, time,             

      situation. Cardiovascular: Heart tones S1 S2 present Rhythm is sinus rhythm.                

      Respiratory: Airway is patent Respiratory effort is even, unlabored, Respiratory            

      pattern is regular, symmetrical. GI: Abdomen is non-distended, obese, Bowel sounds          

      present X 4 quads. Reports nausea. : suprapubic catheter in place Urine is cloudy.        

      Derm: Wound noted gluteal cleft and scrotum. Musculoskeletal: Range of motion: limited      

      in right and left leg Swelling present in right leg and left leg.                           

20:30 Reassessment: Patient is alert, oriented x 3, equal unlabored respirations, skin        ha1 

      warm/dry/pink. reports pain 8/10. Notified care provider.                                   

20:45 Reassessment: Nurse to nurse report received by Allie in ICU.                            3 

21:30 Reassessment: Patient and/or family updated on plan of care and expected duration. Pain ha1 

      level reassessed. Respiratory: Airway is patent Respiratory effort is even, unlabored,      

      Respiratory pattern is regular, symmetrical.                                                

                                                                                                  

Vital Signs:                                                                                      

14:37 BP 99 / 72; Pulse 97; Resp 12; Temp 97.6(TE); Pulse Ox 92% on R/A; Weight 127.46 kg;    3 

      Height 4 ft. 11 in. ; Pain 10/10;                                                           

15:00 BP 93 / 68; Pulse 98; Resp 20; Pulse Ox 95% on 3 lpm NC;                                eh3 

16:00  / 68; Pulse 96; Resp 19; Pulse Ox 96% on 3 lpm NC;                               eh3 

17:00  / 95; Pulse 98; Resp 20; Pulse Ox 97% on 3 lpm NC;                               eh3 

18:00  / 81; Pulse 120; Resp 20; Pulse Ox 97% on 3 lpm NC;                              eh3 

19:00  / 73; Pulse 97; Resp 20; Pulse Ox 95% on 3 lpm NC;                               eh3 

20:00  / 73; Pulse 103; Resp 20 S; Pulse Ox 97% on 2 lpm NC;                            ha1 

21:00  / 75; Pulse 99; Resp 18 S; Pulse Ox 98% on 5 lpm NC;                             ha1 

22:00  / 74; Pulse 102; Resp 19 S; Pulse Ox 97% on 5 lpm NC;                            ha1 

14:37 Body Mass Index 56.75 (127.46 kg, 149.86 cm)                                            3 

14:37 Pain Scale: Adult                                                                       [facility] 

                                                                                                  

ED Course:                                                                                        

14:37 Patient arrived in ED.                                                                  3 

14:43 Triage completed.                                                                       3 

14:43 Arm band placed on.                                                                     3 

14:44 Charles Marte is Attending Physician.                                                     sk4 

14:44 Charles Marte is Attending Physician.                                                     sk4 

14:46 Patient has correct armband on for positive identification. Bed in low position. Call   [facility] 

      light in reach. Side rails up X2. Client placed on continuous cardiac and pulse             

      oximetry monitoring. NIBP monitoring applied. Door closed. Noise minimized.                 

14:46 Oxygen administration via nasal cannula \T\ 3L/min.                                       eh3 

14:57 Domenica Smith, RN is Primary Nurse.                                                        eh3 

15:27 XRAY Chest (1 view) In Process Unspecified.                                             EDMS

15:56 Inserted saline lock: 22 gauge in left forearm, using aseptic technique. Blood          zm  

      collected.                                                                                  

15:59 Basic Metabolic Panel Sent.                                                             zm  

15:59 CBC with Diff Sent.                                                                     zm  

15:59 Troponin HS Sent.                                                                       zm  

15:59 BNP Sent.                                                                               zm  

16:47 Notified ED physician of a critical lab result(s). .                             eh3 

19:48 Domingo Mc MD is Hospitalizing Provider.                                            sk4 

20:32 No provider procedures requiring assistance completed. Patient admitted, IV remains in  3 

      place.                                                                                      

                                                                                                  

Administered Medications:                                                                         

14:52 CANCELLED (wrong order): fentaNYL (PF) IVP 50 mcg IVP once                              sk4 

15:30 CANCELLED (Duplicate Order): DuoNeb Nebulize (3:1) (2.5 mg - 0.5 mg) 3 ml Nebulizer oncesk4 

15:45 Drug: fentaNYL (PF) IVP 25 mcg Route: IVP; Site: left forearm;                          eh3 

16:45 Follow up: Response: Pain is unchanged, physician notified                              eh3 

16:30 Drug: Albuterol Inhalation 2.5 mg Route: Inhalation;                                    eh3 

17:30 Follow up: Response: No adverse reaction                                                eh3 

17:10 Drug: Insulin Regular Human IVP 10 units {Co-Signature: ld1 (Suly Gonsalez RN).} Route:  eh3 

      IVP; Site: left forearm;                                                                    

18:15 Follow up: Response: Blood sugar is unchanged                                           eh3 

17:15 Drug: NS 0.9% IV 1000 ml Route: IV; Rate: 125 ml/hr; Site: left forearm;                eh3 

18:04 Drug: HYDROmorphone IVP 0.5 mg Route: IVP; Site: left forearm;                          eh3 

19:00 Follow up: Response: Pain is decreased                                                  eh3 

20:35 Drug: Rocephin IV 1 grams Route: IV; Rate: bolus; Site: left forearm;                   ha1 

                                                                                                  

                                                                                                  

Medication:                                                                                       

20:32 VIS not applicable for this client.                                                     eh3 

                                                                                                  

Outcome:                                                                                          

19:48 Decision to Hospitalize by Provider.                                                    sk4 

22:21 Patient left the ED.                                                                    ha1 

22:21 Condition: stable                                                                       ha1 

22:21 Admitted to ICU accompanied by nurse, via stretcher, room 6, with oxygen, on monitor,   ha1 

      with chart, Report called to  YADIEL Wallace. report was given by YADIEL Metzger                       

22:21 Discharge instructions given to patient, Instructed on the need for admit, Demonstrated     

      understanding of instructions.                                                              

                                                                                                  

Signatures:                                                                                       

Dispatcher [employer]                           EDMS                                                 

Domenica Smith RN RN   3                                                  

Savannah Mcintyre Heidy, RN                        RN   ha1                                                  

Charles Marte                                  [facility]                                                  

Suly Gonsalez RN                              ld1                                                  

                                                                                                  

Corrections: (The following items were deleted from the chart)                                    

16:01 15:59 Inserted saline lock: 22 gauge in left forearm, using aseptic technique. Blood    zm  

      collected.                                                                                

19:35 14:46 Pain: Complains of pain in mid-sternal area eh3                                   eh3 

19:36 15:00 Reassessment: Patient appears in no apparent distress at this time. Patient       eh3 

      and/or family updated on plan of care and expected duration. Pain level reassessed.         

      Patient is alert, oriented x 3, equal unlabored respirations, skin warm/dry/pink. [facility]       

19:39 16:00 BP 93 / 68; Pulse 98bpm; Resp 20bpm; Pulse Ox 95% 3 lpm Nasal Cannula; [place] [id number] 

20:32 17:00  / 85; Pulse 98bpm; Resp 20bpm; Pulse Ox 97% 3 lpm Nasal Cannula; [place] [id number] 

0504                                                                                             

05:38 05/03 22:00  / 74; Pulse 108bpm; Resp 19bpm; Spontaneous; Pulse Ox 97% 5 lpm      ha1 

      Nasal Cannula; ha1                                                                          

                                                                                                  

**************************************************************************************************

## 2023-05-03 NOTE — RAD REPORT
EXAM DESCRIPTION:  [place] Single View5/3/2023 3:26 pm

 

CLINICAL HISTORY:  Cough

 

COMPARISON:  January 2023

 

FINDINGS:   The lungs appear clear of acute infiltrate. The heart is normal size

 

Shunt courses the right hemithorax

 

IMPRESSION:  No acute abnormalities displayed

## 2023-05-03 NOTE — EDPHYS
Physician Documentation                                                                           

 [facility]                                                                 

Name: Redd Dale Jr                                                                            

Age: 37 yrs                                                                                       

Sex: Male                                                                                         

: 1985                                                                                   

MRN: [id number]                                                                                   

Arrival Date: 2023                                                                          

Time: 14:25                                                                                       

Account#: [id number]                                                                            

Bed 15                                                                                            

Private MD:                                                                                       

ED Physician Charles Marte                                                                          

HPI:                                                                                              

                                                                                             

14:53 This 37 yrs old Black Male presents to ER via EMS with complaints of Chest Pain.        sk4 

14:53 pt here for 'pain all over'. having some cough and difficulty breathing. has history of sk4 

      spina bifida, indwelling contreras, has o2 at home but doesn't use all the time. lives          

      alone. pcp is Dr. Mc..                                                                   

                                                                                                  

Historical:                                                                                       

- Allergies:                                                                                      

14:43 Amoxicillin;                                                                            eh3 

14:43 Bactrim;                                                                                eh3 

14:43 Ciprofloxacin;                                                                          eh3 

14:43 CLAVULANIC ACID;                                                                        eh3 

14:43 Demerol;                                                                                eh3 

14:43 Doxycycline;                                                                            eh3 

14:43 Levofloxacin;                                                                           eh3 

14:43 Morphine;                                                                               eh3 

14:43 PENICILLINS;                                                                            eh3 

14:43 Toradol;                                                                                eh3 

14:43 TRIMETHOPRIM;                                                                           eh3 

14:43 Vancomycin;                                                                             eh3 

14:43 Zofran;                                                                                 eh3 

- Home Meds:                                                                                      

14:43 Dilaudid [Active]; Phenergan Oral [Active]; Insulin: Regular Sub-Q [Active];            eh3 

- PMHx:                                                                                           

14:43 Asthma; Cerebral Palsy; cluster headaches; decubitus ulcers on feet; diabetes mellitus; eh3 

      GERD; Hydrocephalus; Hypertension; spina bifida;                                            

- PSHx:                                                                                           

14:43 Cholecystectomy; Shunt Revision;                                                        eh3 

                                                                                                  

- Immunization history:: Adult Immunizations up to date.                                          

- Social history:: Smoking status: unknown.                                                       

                                                                                                  

                                                                                                  

ROS:                                                                                              

14:53 Constitutional: Negative for fever, chills, and weight loss.                            sk4 

14:53 Respiratory: Positive for cough, shortness of breath.                                       

                                                                                                  

Exam:                                                                                             

14:53 Constitutional:  This is a well developed, well nourished patient who is awake, alert,  sk4 

      and in no acute distress. Chest/axilla:  Normal chest wall appearance and motion.           

      Nontender with no deformity.  No lesions are appreciated. Cardiovascular:  Regular rate     

      and rhythm with a normal S1 and S2.  No gallops, murmurs, or rubs.  Normal PMI, no JVD.     

       No pulse deficits. Respiratory:  Lungs have equal breath sounds bilaterally                

      Abdomen/GI:  Soft, non-tender. suprapubic catheter in place Skin:  Warm, dry with           

      normal turgor.  Normal color with no rashes, no lesions, and no evidence of cellulitis.     

      Neuro:  Awake and alert,  Psych:  Behavior, mood, and affect are within normal limits.      

19:45 ECG was reviewed by the Attending Physician. NSR, no acute ST/t changes                 sk4 

                                                                                                  

Vital Signs:                                                                                      

14:37 BP 99 / 72; Pulse 97; Resp 12; Temp 97.6(TE); Pulse Ox 92% on R/A; Weight 127.46 kg;    eh3 

      Height 4 ft. 11 in. ; Pain 10/10;                                                           

15:00 BP 93 / 68; Pulse 98; Resp 20; Pulse Ox 95% on 3 lpm NC;                                eh3 

16:00  / 68; Pulse 96; Resp 19; Pulse Ox 96% on 3 lpm NC;                               3 

17:00  / 95; Pulse 98; Resp 20; Pulse Ox 97% on 3 lpm NC;                               eh3 

18:00  / 81; Pulse 120; Resp 20; Pulse Ox 97% on 3 lpm NC;                              eh3 

19:00  / 73; Pulse 97; Resp 20; Pulse Ox 95% on 3 lpm NC;                               eh3 

20:00  / 73; Pulse 103; Resp 20 S; Pulse Ox 97% on 2 lpm NC;                            ha1 

21:00  / 75; Pulse 99; Resp 18 S; Pulse Ox 98% on 5 lpm NC;                             ha1 

22:00  / 74; Pulse 102; Resp 19 S; Pulse Ox 97% on 5 lpm NC;                            ha1 

14:37 Body Mass Index 56.75 (127.46 kg, 149.86 cm)                                            [facility] 

14:37 Pain Scale: Adult                                                                       3 

                                                                                                  

MDM:                                                                                              

14:53 Differential diagnosis: abnormal EKG, acute myocardial infarction, congestive heart     sk4 

      failure pleurisy, pneumonia, unstable angina. Data reviewed: vital signs, nurses notes,     

      lab test result(s), EKG, radiologic studies. I considered the following discharge           

      prescriptions or medication management in the emergency department Medications were         

      administered in the Emergency Department. See MAR.                                          

19:45 Consideration of Admission/Observation Patient was admitted/placed on observation.      sk4 

      Management of patient was discussed with the following: Primary Care Provider: Dr. Mc. Independent interpretation of the following test(s) in the Emergency Department      

      EKG: See my EKG interpretation above. Care significantly affected by the following          

      chronic conditions: Diabetes, Obesity. Medication response: IV fluids, insulin,             

      dilaudid/zofran. Response to treatment: the patient's symptoms have mildly improved         

      after treatment. ED course: updated pt on all results. iv fluid bolus and insulin           

      given. repeat glucose remains over 500. discussed with Dr. Mc who advises admission      

      to ICU on insulin drip. Pt has indwelling suprapubic which has some leaking from the        

      site. Bag is full which confirms there is adequate drainage. Urine cloudy and               

      malodorous. Pt given rocpehin empirically after discussion about allergies and which        

      abx have helped him before. States has never had rxn to rocephin and this is typically      

      how his urinary infections are treated. .                                                   

19:48 Patient medically screened.                                                             [facility] 

                                                                                                  

                                                                                             

14:51 Order name: Basic Metabolic Panel; Complete Time: 16:52                                 [facility] 

                                                                                             

14:51 Order name: CBC with Diff; Complete Time: 16:48                                         [facility] 

                                                                                             

14:51 Order name: Troponin HS; Complete Time: 16:52                                           [facility] 

                                                                                             

14:56 Order name: BNP; Complete Time: 16:48                                                   [facility] 

                                                                                             

14:56 Order name: Urinalysis w/ reflexes                                                      [facility] 

                                                                                             

16:56 Order name: Glucose, Ancillary Testing; Complete Time: 17:34                            [facility]

                                                                                             

18:30 Order name: Glucose                                                                     [facility] 

                                                                                             

19:44 Order name: Glucose, Ancillary Testing                                                  [facility]

                                                                                             

14:51 Order name: XRAY Chest (1 view); Complete Time: 15:44                                   [facility] 

                                                                                             

14:51 Order name: EKG; Complete Time: 14:52                                                   [facility] 

                                                                                             

14:51 Order name: Cardiac monitoring; Complete Time: 14:57                                    [facility] 

                                                                                             

14:51 Order name: EKG - Nurse/Tech; Complete Time: 16:20                                      [facility] 

                                                                                             

14:51 Order name: IV Saline Lock; Complete Time: 15:59                                        [facility] 

                                                                                             

14:51 Order name: Labs collected and sent; Complete Time: 15:59                               [facility] 

                                                                                             

14:51 Order name: O2 Per Protocol; Complete Time: 14:57                                       sk4 

                                                                                             

14:51 Order name: O2 Sat Monitoring; Complete Time: 14:57                                     sk4 

                                                                                                  

Administered Medications:                                                                         

14:52 CANCELLED (wrong order): fentaNYL (PF) IVP 50 mcg IVP once                              sk4 

15:30 CANCELLED (Duplicate Order): DuoNeb Nebulize (3:1) (2.5 mg - 0.5 mg) 3 ml Nebulizer oncesk4 

15:45 Drug: fentaNYL (PF) IVP 25 mcg Route: IVP; Site: left forearm;                          3 

16:45 Follow up: Response: Pain is unchanged, physician notified                              eh3 

16:30 Drug: Albuterol Inhalation 2.5 mg Route: Inhalation;                                    eh3 

17:30 Follow up: Response: No adverse reaction                                                3 

17:10 Drug: Insulin Regular Human IVP 10 units {Co-Signature: ld1 (Suly Gonsalez RN).} Route:  eh3 

      IVP; Site: left forearm;                                                                    

18:15 Follow up: Response: Blood sugar is unchanged                                           3 

17:15 Drug: NS 0.9% IV 1000 ml Route: IV; Rate: 125 ml/hr; Site: left forearm;                eh3 

18:04 Drug: HYDROmorphone IVP 0.5 mg Route: IVP; Site: left forearm;                          eh3 

19:00 Follow up: Response: Pain is decreased                                                  3 

20:35 Drug: Rocephin IV 1 grams Route: IV; Rate: bolus; Site: left forearm;                   ha1 

                                                                                                  

                                                                                                  

Disposition Summary:                                                                              

23 19:48                                                                                    

Hospitalization Ordered                                                                           

      Hospitalization Status: Inpatient Admission                                             [facility] 

      Provider: Domingo Mc                                                                 [facility] 

      Location: Intensive Care Unit                                                           sk4 

      Condition: Stable                                                                       sk4 

      Problem: an acute exacerbation                                                          sk4 

      Symptoms: have improved                                                                 sk4 

      Bed/Room Type: Standard                                                                 [facility] 

      Room Assignment: 6-(23 20:16)                                                       

      Diagnosis                                                                                   

        - Other specified diabetes mellitus with hyperosmolarity without nonketotic           sk4 

      hyperglycemic-hyperosmolar coma ([facility])                                                     

        - UTI/ Urinary tract infection, site not specified                                    [facility] 

      Forms:                                                                                      

        - Medication Reconciliation Form                                                      sk4 

        - SBAR form                                                                           sk4 

Signatures:                                                                                       

Dispatcher [employer]                           Vanessa Olvera RN RN                                                      

Domeniac Smith RN                          RN   [facility]                                                  

Ninoska Ordoñez RN                        RN   [facility]                                                  

Charles Marte                                  [address], Suly RN                              ld1                                                  

                                                                                                  

Corrections: (The following items were deleted from the chart)                                    

14:52 14:51 fentaNYL (PF) IVP 50 mcg IVP once ordered. sk4                                    sk4 

15:30 14:53 DuoNeb Nebulize (3:1) (2.5 mg - 0.5 mg) 3 ml Nebulizer once ordered. sk4          sk4 

15:30 15:30 DuoNeb Nebulize (3:1) (2.5 mg - 0.5 mg) 3 ml Nebulizer once ordered. sk4          sk4 

20:16 19:48 sk4                                                                               cg  

                                                                                                  

**************************************************************************************************

## 2023-05-03 NOTE — XMS REPORT
Continuity of Care Document

                             Created on:May 3, 2023



Patient:CHITO BRYANTJORDAN AlvarezAJITH

Sex:Male

:1985

External Reference #:[id number]





Demographics







                          Address                   [address]



                                                    [place] [id number]

 

                          Home Phone                [phone]

 

                          Work Phone                [phone]

 

                          Mobile Phone              [phone]

 

                          Email Address             [address]

 

                          Preferred Language        English

 

                          Marital Status            Unknown

 

                          Zoroastrian Affiliation     Unknown

 

                          Race                      Unknown

 

                          Additional Race(s)        Unavailable



                                                    Black or 

 

                          Ethnic Group              Unknown









Author







                          Organization              [facility]

t

 

                          Address                   [address]



                                                    [place] [address]

 

                          Phone                     [phone]









Support







                Name            Relationship    Address         Phone

 

                SABRINA VERDIN              [address]           [phone]



                                                [address] [place] [place] [id number] 

 

                SABRINA VERDIN  [place]              [address] [phone]



                                                [place] [id number] 

 

                one else per patient, No Unavailable     Unavailable     Unavail

able

 

                Jordan Verdin  Unavailable     [phone]



                                                [place] [address] 

 

                Cecelia Verdin Unavailable     Unavailable     [phone]

 

                Nena Verdin Jrjoanie Victoriaajith Unavailable     [address]

 [phone]



                                                [place] [address] 

 

                ChitoSonnySabrina  Mother          [address] [phone]

[phone]



                                                [place] [id number] 

 

                PATIENT, NO ONE ELSE PER Unavailable     Unavailable     Unavail

able

 

                PHYILLIS        Grandparent     Unavailable     Unavailable

 

                SABRINA VERDIN M               [address]    Unavailabl

e



                                                [place] [id number] 

 

                Gabriela VerdinSabrina Mother          [address]    [phone]



                                                [place] [address] 

 

                YAZ, SMITA  Grandparent     Unavailable     [phone]

 

                JORDAN VERDIN  F               [address]    Unavailable



                                                [place] [address] 

 

                O'GREG, SMITA LAURA Grandparent     [address]       Unavailable



                                                [place] [address] 

 

                Laura O'Greg, Smita Grandparent     [address]       [phone]



                                                [place] [address] 

 

                Chito Sr., Jordan Father          [address]    [phone]

[phone]



                                                [place] [address] 









Care Team Providers







                    Name                Role                Phone

 

                    Sharpless           Primary Care Physician [phone]

 

                    Domingo Mc       Attending Clinician Unavailable

 

                    SUREKHA HUYNH      Attending Clinician Unavailable

 

                    SCHOENSTEIN, LYNDA  Attending Clinician Unavailable

 

                    Schoenstein MD, Lynda Attending Clinician [phone]

 

                    Mary Anne Ramirez MD          Attending Clinician [phone]

 

                    Alan [facility] Center[facility] Attending Clinician Unavailable

 

                    MARY ANNE RAMIREZ             Attending Clinician Unavailable

 

                    RADHA FOFANA     Attending Clinician Unavailable

 

                    Radha Fofana MD  Attending Clinician [phone]

 

                    Surekha Huynh MD   Attending Clinician [phone]

 

                    NEETA MARIA    Attending Clinician Unavailable

 

                    Neeta Maria NP Attending Clinician [phone]

 

                    Nurse, [facility] Gu Attending Clinician Unavailable

 

                    BOSSMAN VILAL Attending Clinician Unavailable

 

                    Bossman Contreras Attending Clinician [phone]

 

                    Dulce Guzman LVN Attending Clinician [phone]

 

                    DONALD FOX  Attending Clinician Unavailable

 

                    DONALD FOX  Attending Clinician Unavailable

 

                    Yoni Bangura Attending Clinician [phone]

 

                    Noe Phillips MD     Attending Clinician [phone]

 

                    Marcela Allan     Attending Clinician [phone]

 

                    Vikash Anglin MD  Attending Clinician [phone]

 

                    Alondra Dumont Attending Clinician Unavailable

 

                    Adria Bay MD Attending Clinician [phone]

[phone]

 

                    VARINDER SOL     Attending Clinician Unavailable

 

                    Varinder Sol DO  Attending Clinician [phone]

 

                    Roya Sotelo RN    Attending Clinician [phone]

 

                    INOCENCIA GOLDEN  Attending Clinician Unavailable

 

                    Inocencia Golden DO Attending Clinician [phone]

 

                    RAND DONG      Attending Clinician Unavailable

 

                    Rand Dong MD   Attending Clinician [phone]

 

                    Doctor Unassigned, No Name Attending Clinician Unavailable

 

                    Lab, Ang - Db       Attending Clinician Unavailable

 

                    EDWARDO LOPEZ       Attending Clinician Unavailable

 

                    Edwardo Lopez DO    Attending Clinician [phone]

 

                    ALMA VILLASEÑOR    Attending Clinician Unavailable

 

                    Erum FNP, Alma PAN Attending Clinician [phone]

 

                    SILVINO GARAY    Attending Clinician Unavailable

 

                    Lesley MEADOWS, Silvino GRANADOS Attending Clinician [phone]

 

                    RACHEL BAILEY      Attending Clinician Unavailable

 

                    Marcelle MEADOWS, Rachel   Attending Clinician [phone]

 

                    OGUNLANA, SAPPHIRE A Attending Clinician Unavailable

 

                    Scooter COTTO, Yessica NELSON Attending Clinician Unavailable

 

                    Yoselyn MEADOWS, Jessi  Attending Clinician [phone]

 

                    Ronel COTTO, Stephany ARAUJO Attending Clinician [phone]

 

                    PEDRO SHARPE   Attending Clinician Unavailable

 

                    Thanh JACOBSON, Yo MANRIQUEZ Attending Clinician [phone]

 

                    Jojo MEADOWS, Keeann Olmedo Attending Clinician [phone]

[phone]

 

                    Trish MAEDOWS, León [place]   Attending Clinician [phone]

 

                    Alan MEADOWS, Liz G  Attending Clinician [phone]

 

                    Pedro Sharpe MD Attending Clinician [phone]

 

                    Sheridan Delarosa MD Attending Clinician [phone]

 

                    Shahid MEADOWS, Mac ARAUJO   Attending Clinician [phone]

 

                    Vamshi ALEGRE, Sapphire A Attending Clinician [phone]

 

                    SUNIL LU      Attending Clinician Unavailable

 

                    Ashly Greene S  Attending Clinician [phone]

 

                    Sunil Lu MD   Attending Clinician [phone]

 

                    RADHA MEADOWS Attending Clinician Unavailable

 

                    Alondra Santos MD Attending Clinician [phone]

 

                    KEM MEADOWS    Attending Clinician Unavailable

 

                    Kendrick MEADOWS, Kem Attending Clinician [phone]

 

                    Josse Alonso Attending Clinician [phone]

 

                    Octavio JACOBSON, Shelton Attending Clinician [phone]

 

                    DEAN SEGOVIA    Attending Clinician Unavailable

 

                    Tobias Hernandez MD  Attending Clinician [phone]

 

                    Efrain Myles MD Attending Clinician [phone]

 

                    Andre MEADOWS, Martha SMITH  Attending Clinician [phone]

 

                    Dean Segovia MD Attending Clinician Unavailable

 

                    ROMERO FAIRCHILD Attending Clinician Unavailable

 

                    Romero Fairchild Attending Clinician [phone]

 

                    Ap Bernardo RobyDonna Attending Clinician [phone]

 

                    ALLISON ROMEO     Attending Clinician Unavailable

 

                    ALLISON Carver Attending Clinician [phone]

 

                    Only, Ang Db Test   Attending Clinician Unavailable

 

                    Stefano Llanes MD     Attending Clinician [phone]

 

                    STEFANO LLANES        Attending Clinician Unavailable

 

                    MARTY RIVERS    Attending Clinician Unavailable

 

                    Deisy Linares   Attending Clinician [phone]

 

                    Deedee Reinoso   Attending Clinician [phone]

 

                    RAMU TORRES Attending Clinician Unavailable

 

                    B[place] Admitting Clinician Unavailable

 

                    DONALD FOX  Admitting Clinician Unavailable

 

                    RAND DONG      Admitting Clinician Unavailable

 

                    Rand Dong MD   Admitting Clinician [phone]

 

                    EDWARDO LOPEZ       Admitting Clinician Unavailable

 

                    Edwardo Lopez DO    Admitting Clinician [phone]

 

                    SILVINO GARAY    Admitting Clinician Unavailable

 

                    RACHEL BAILEY      Admitting Clinician Unavailable

 

                    Rachel Bailey MD   Admitting Clinician [phone]

 

                    LEÓN CHAMBERS      Admitting Clinician Unavailable

 

                    León Chambers MD   Admitting Clinician [phone]

 

                    SUNIL LU      Admitting Clinician Unavailable

 

                    Sunil Lu MD   Admitting Clinician [phone]

 

                    VARINDER SOL     Admitting Clinician Unavailable

 

                    KEM MEADOWS    Admitting Clinician Unavailable

 

                    Kem Meadows MD Admitting Clinician [phone]

 

                    TOBIAS HERNANDEZ     Admitting Clinician Unavailable

 

                    Tobias Hernandez MD  Admitting Clinician [phone]

 

                    DEISY SUTTON Admitting Clinician Unavailable

 

                    Deisy Sutton Admitting Clinician [phone]

 

                    Bernardo De Souza Admitting Clinician [phone]

 

                    ALLISON ROMEO     Admitting Clinician Unavailable

 

                    ALMA [place]ASEÑOR    Admitting Clinician Unavailable

 

                    NEETA MARIA    Admitting Clinician Unavailable

 

                    NURIA PEREIRA        Admitting Clinician Unavailable

 

                    Peter De Leon Admitting Clinician [phone]

 

                    Deedee Reinoso   Admitting Clinician [phone]

 

                    RAMU TORRES Admitting Clinician Unavailable









Payers







           Payer Name Policy Type Policy Number Effective Date Expiration Date S

cameron

 

           [employer]            [id number]  2021            



           PLUS                             00:00:00              

 

           [employer]            [id number]  2022            



                                            00:00:00              

 

           [facility]         [id number]                        Common



           (Medicaid)                                             [facility]

 

           [facility]         [id number]                        Common



           (Medicaid)                                             [facility]

 

           [employer]          [id number]                        Common



           (Medicaid)                                             [facility]         [id number]                        Common



           (Medicaid)                                             [facility]

 

           [facility]         [id number]                        Common



           (Medicaid)                                             [facility]







Problems







       Condition Condition Condition Status Onset  Resolution Last   Treating Co

mments 

Source



       Name   Details Category        Date   Date   Treatment Clinician        



                                                 Date                 

 

       Hyperglyce Hyperglyce Disease Active                              U

nivers



       jarvis due to jarvis due to               2-21                               it

y of



       diabetes diabetes               00:00:                             [place]



       mellitus mellitus               00                                 Medica

l



                                                                      Branch

 

       Weakness Weakness Disease Active                              Unive

rs



                                   2-01                               ity of



                                   00:00:                             [place]



                                   00                                 Medical



                                                                      Branch

 

       Lactic Lactic Disease Active 2022                             [facility]



       acidosis acidosis               1-06                               ity of



                                   00:00:                             [place]



                                   00                                 Medical



                                                                      Branch

 

       Nausea and Nausea and Disease Active 2022                             U

nivers



       vomiting, vomiting,               0-21                               ity 

of



       unspecifie unspecifie               00:00:                             [place]

[place]



       d vomiting d vomiting               00                                 Me

dical



       type   type                                                    Branch

 

       Chest pain Chest pain Disease Active                              U

nivers



                                   8-04                               ity of



                                   00:00:                             [place]



                                   00                                 Medical



                                                                      Branch

 

       Foot ulcer Foot ulcer Disease Active                       Overview

: Univers



       with fat with fat               8                        Formattin ity

 of



       layer  layer                00:00:                      g of this [place]



       exposed, exposed,               00                          note   Medica

l



       left   left                                             might be Branch



                                                               different 



                                                               from the 



                                                               original. 



                                                               Added  



                                                               automatic 



                                                               ally from 



                                                               request 



                                                               for    



                                                               surgery 



                                                               [id number] 

 

       Right foot Right foot Disease Active                       Overview

: Univers



       ulcer, ulcer,                                       Formattin ity of



       with fat with fat               00:00:                      g of this Eric

as



       layer  layer                00                          note   Medical



       exposed exposed                                           might be Branch



                                                               different 



                                                               from the 



                                                               original. 



                                                               Added  



                                                               automatic 



                                                               ally from 



                                                               request 



                                                               for    



                                                               surgery 



                                                               [id number] 

 

       Diabetic Diabetic Disease Active                              Unive

rs



       ketoacidos ketoacidos                                              it

y of



       is without is without               00:00:                             Te

[place]



       coma   coma                 00                                 Medical



       associated associated                                                  Br

anch



       with type with type                                                  



       1 diabetes 1 diabetes                                                  



       mellitus mellitus                                                  

 

       Abdominal Abdominal Disease Active                              Uni

vers



       pain,  pain,                624                               ity of



       unspecifie unspecifie               00:00:                             Te

[place]



       d      d                    00                                 Medical



       abdominal abdominal                                                  Bran

ch



       location location                                                  

 

       Hyperglyce Hyperglyce Disease Active                              U

[place]



       jarvis    jarvis                                                 ity of



                                   00:00:                             [place]



                                   00                                 Medical



                                                                      Branch

 

       Decubitus Decubitus Disease Active                              Uni

vers



       ulcer of ulcer of               6-05                               ity of



       heel,  heel,                00:00:                             [place]



       left,  left,                00                                 Medical



       unstageabl unstageabl                                                  Br

anch



       e      e                                                       

 

       Decubitus Decubitus Disease Active                              Uni

vers



       ulcer of ulcer of               6-05                               ity of



       heel,  heel,                00:00:                             [place]



       left,  left,                00                                 Medical



       unstageabl unstageabl                                                  Br

anch



       e      e                                                       

 

       Diabetic Diabetic Disease Active                              Unive

rs



       ketoacidos ketoacidos               6-05                               it

y of



       is without is without               00:00:                             Te

[place]



       coma   coma                 00                                 Medical



       associated associated                                                  Br

anch



       with type with type                                                  



       2 diabetes 2 diabetes                                                  



       mellitus mellitus                                                  

 

       Decubitus Decubitus Disease Active                              Uni

vers



       ulcer of ulcer of               6-05                               ity of



       heel,  heel,                00:00:                             [place]



       left,  left,                00                                 Medical



       unstageabl unstageabl                                                  Br

anch



       e      e                                                       

 

       Pyuria Pyuria Disease Active                              Univers



                                   6-05                               ity of



                                   00:00:                             [place]



                                   00                                 Medical



                                                                      Branch

 

       Chronic Chronic Disease Active                              Univers



       suprapubic suprapubic               6-                               it

y of



       catheter catheter               00:00:                             [place]



                                   00                                 Medical



                                                                      Branch

 

       Uncontroll Uncontroll Disease Active                              U

[place]



       ed     ed                   6                               ity of



       diabetes diabetes               00:00:                             [place]



       mellitus mellitus               00                                 Medica

l



       with   with                                                    Branch



       complicati complicati                                                  



       ons    ons                                                     

 

       Spina  Spina  Disease Recurre                              Univers



       bifida bifida        nce    6-03                               ity of



                                   00:00:                             [place]



                                   00                                 Medical



                                                                      Branch

 

       Wound  Wound  Disease Active                              Univers



       infection infection               5-11                               ity 

of



                                   00:00:                             [place]



                                   00                                 Medical



                                                                      Branch

 

       Chills Chills Disease Active                              Univers



                                   5-11                               ity of



                                   00:00:                             [place]



                                   00                                 Medical



                                                                      Branch

 

       POSSIBLE  POSSIBLE Diagnosis Active 2022             

  [facility]



        SHUNT  SHUNT                         21:48:00               l



       MALFUNCTIO MALFUNCTIO               00:00:                             He

goyoann



       N      N Active               00                                 



              2022                                                  



              [facility]                                                  

 

        SHUNT   SHUNT Diagnosis Active 2022             

  [facility]



       MALFUNCTIO MALFUNCTIO               3-25          14:12:00               

l



       N      N Active               00:00:                             Jose



              2022                                 



              [facility]                                                  

 

        SHUNT   SHUNT Diagnosis Active 2022             

  [facility]



       MALFUCTION MALFUCTION               3-24          23:59:00               

l



              Active               00:00:                             Jose



              2022               00                                 



               [facility]                                                  

 

       Complicate Complicate Disease Active                              U

nivers



       d urinary d urinary               1-20                               ity 

of



       tract  tract                00:00:                             [place]



       infection infection               00                                 Medi

sarah



                                                                      Branch

 

       Hyperglyce Hyperglyce Disease Active                              C

[facility]



       jarvis    jarvis                  1-07                               [facility]



       without without               00:00:                             Medical



       ketosis ketosis               00                                 Center

 

       HEADACHE  HEADACHE Diagnosis Active 2018             

  [facility]



              Active                         22:05:00               l



              2018               00:00:                             Miguel malloy



               [address]                                                  

 

       SHUNT   SHUNT Diagnosis Active 2018               Mem

oria



       MALFUNCTIO MALFUNCTIO                         20:00:00               

l



       N      N Active               00:00:                             Jose



              2018               00                                 



               [facility]                                                  

 

       ACUTE    ACUTE Diagnosis Active 2018               Me

moria



       HEADACHE HEADACHE                         09:20:00               l



              Active               00:00:                             Jose



              2018                                 



              [facility]                                                  

 

       Spina  Spina  Disease Recurre                              [facility]



       bifida bifida        nce    6-12                               [facility]



                                   00:00:                             Medical



                                   00                                 Center

 

       Pyelonephr Pyelonephr Disease Active                              C

[facility]



       itis   itis                 6                               [facility]



                                   00:00:                             [facility]



                                   00                                 Center

 

       Morbid Morbid Disease Active                              [facility]



       obesity obesity               104                               ity of



       with body with body               00:00:                             Mariana

s



       mass index mass index               00                                 Me

dical



       of     of                                                      Branch



       40.0-49.9 40.0-49.9                                                  

 

       Lumbar        Problem Active                                    Common



       spina                                                          [facility]



       bifida                                                         - [facility]



       with                                                           [facility]

 

       Dependence        Problem Active                                    Commo

n



       on                                                             [facility]



       wheelchair                                                         [facility]

 

       Paraplegia        Problem Active                                    Commo

n



                                                                      [facility]



                                                                      [facility]

 

       Constipati        Problem Active                                    Commo

n



       on                                                             [facility]



                                                                      [facility]

 

       Incontinen        Problem Active                                    Commo

n



       ce of                                                          [facility]



       urine                                                          [facility]

 

       Nausea        Problem Active                                    [facility]

 

       Mixed         Problem Active                                    Common



       anxiety                                                         [facility]



       and                                                            [facility]



       depressive                                                         [facility]

 

       [id number]        Problem Active                                    Common



                                                                      [facility]

 

       Bowel         Problem Active                                    Common



       incontinen                                                         [facility]



       ce                                                             [facility]

 

       [id number]        Problem Active                                    Common



                                                                      [facility]

 

       [id number]        Problem Active                                    Common



                                                                      [facility]

 

       [id number]        Problem Active                                    Common



                                                                      [facility]

 

       [id number]        Problem Active                                    Commo

n



       [id number]                                                         [facility]

 

       Foot ulcer        Problem Active                                    Commo

n



                                                                      [facility]

 

       Myelocele        Problem Active                                    Common



       with                                                           Spirit



       [facility]

 

       [id number]        Problem Active                                    Common



                                                                      [facility]

 

       [id number]        Problem Active                                    Common



                                                                      [facility]

 

       Nicotine        Problem Active                                    Common



       dependence                                                         [facility]

 

       [id number]        Problem Active                                    Common



                                                                      [facility]

 

       [id number]        Problem Active                                    Common



                                                                      [facility]

 

       [id number]        Problem Active                                    Common



                                                                      [facility]

 

       [id number]        Problem Active                                    Common



                                                                      [facility]

 

       Spina   Spina Problem                      2019               Memor

ia



       bifida, bifida,                             14:14:02               l



       unspecifie unspecifie                                                  Amaya gaona                                                       



              2019                                                  



              [facility]                                                  

 

       Nausea   Nausea Problem                      2019               Mem

oria



       with   with                               14:14:02               l



       vomiting, vomiting,                                                  [facility]

nguyen



       unspecifie unspecifie                                                  



       d      d                                                       



              2019                                                  



               [facility]                                                  

 

       Diplopia  Diplopia Problem                      2019               

[facility]



              2019                             14:14:02               l



              [facility]                                                  

 

       Cerebral  Cerebral Problem                      2019               

[facility]



       palsy, palsy,                             14:14:02               l



       unspecifie unspecifie                                                  He

rmann



       d      d                                                       



              2019                                                  



               [facility]                                                  

 

       Acquired  Acquired Problem                      2019               

[facility]



       absence of absence of                             14:14:02               

l



       other  other                                                   [place]



       specified specified                                                  



       parts of parts of                                                  



       digestive digestive                                                  



       tract  tract                                                   



              2019                                                  



               [facility]                                                  

 

       Nicotine  Nicotine Problem                      2019               

[facility]



       dependence dependence                             14:14:02               

l



       ,      ,                                                       Jose



       cigarettes cigarettes                                                  



       ,      ,                                                       



       uncomplica uncomplica                                                  



       huma    huma                                                     



              2019                                                  



              [facility]                                                  

 

       Presence  Presence Problem                      2019               

[facility]



       of     of                                 14:14:02               l



       cerebrospi cerebrospi                                                  He

rmann



       nal fluid nal fluid                                                  



       drainage drainage                                                  



       device device                                                  



              2019                                                  



              [facility]                                                  

 

       Allergy  Allergy Problem                      2019               Me

moria



       status to status to                             14:14:02               l



       other  other                                                   Jose



       antibiotic antibiotic                                                  



       agents agents                                                  



       status status                                                  



              2019                                                  



              [facility]                                                  

 

       Allergy  Allergy Problem                      2019               Me

moria



       status to status to                             14:14:02               l



       other  other                                                   Jose



       drugs, drugs,                                                  



       medicament medicament                                                  



       s and  s and                                                   



       biological biological                                                  



       substances substances                                                  



       status status                                                  



              2019                                                  



              [facility]                                                  

 

       Allergy  Allergy Problem                      2019               Me

moria



       status to status to                             14:14:02               l



       narcotic narcotic                                                  Miguel

n



       agent  agent                                                   



       status status                                                  



              2019                                                  



              [facility]                                                  

 

       Asthma  Asthma Problem Resolve               2022-04-15               Mem

oria



       (disorder) (disorder)        d                    23:48:47               

l



              Resolved                                                  Jose



              Problem                                                  



              04/15/2022                                                  



               [facility]                                                  

 

       Bronchitis  Bronchiti Problem Resolve               2022-04-15           

    [facility]



       (disorder) s             d                    23:48:47               l



              (disorder)                                                  Miguel

n



              Resolved                                                  



              Problem                                                  



              04/15/2022                                                  



              [facility]                                                  

 

       Cerebral  Cerebral Problem Resolve               2022-04-15              

 [facility]



       palsy  palsy         d                    23:48:47               l



       (disorder) (disorder)                                                  He

rmann



              Resolved                                                  



              Problem                                                  



              04/15/2022                                                  



              [facility]                                                  

 

       Hydrocepha  Hydroceph Problem Resolve               2022-04-15           

    [facility]



       ozzy    alus          d                    23:48:47               l



       (disorder) (disorder)                                                  He

rmann



              Resolved                                                  



              Problem                                                  



              04/15/2022                                                  



              [facility]                                                  

 

       Osteomyeli  Osteomyel Problem Resolve               2022-04-15           

    [facility]



       tis    itis          d                    23:48:47               l



       (disorder) (disorder)                                                  He

rmann



              Resolved                                                  



              Problem                                                  



              04/15/2022                                                  



              [facility]                                                  

 

       Acute pain  Acute Problem Active               2022-04-15               M

emoria



       (finding) pain                               23:48:47               l



              (finding)                                                  [place]



              Active                                                  



              Problem                                                  



              04/15/2022                                                  



               [facility]                                                  

 

       Morbid  Morbid Problem Active               2022-04-15               [facility]

rajeev



       obesity obesity                             23:48:47               l



       (disorder) (disorder)                                                  He

rmann



              Active                                                  



              Problem                                                  



              04/15/2022                                                  



              [facility]                                                  

 

       Providenci  Providenc Problem Active               2022-04-15            

   [facility]



       a      ia                                 23:48:47               l



       (organism) (organism)                                                  He

[facility]



              Active                                                  



              Problem                                                  



              04/15/2022                                                  



              Problem                                                  



              added by                                                  



              Discern                                                  



              Expert. [facility]                                                  







History of Past Illness







       Condition Condition Condition Status Onset  Resolution Last   Treating Co

mments 

Source



       Name   Details Category        Date   Date   Treatment Clinician        



                                                 Date                 

 

       Headache  Headache Problem        2019           

    [facility]



              2018-   14:14:02 14:14:02               l



              2019               06:00:                             Miguel malloy



              [address]                                                  







Allergies, Adverse Reactions, Alerts







       Allergy Allergy Status Severity Reaction(s) Onset  Inactive Treating Comm

ents 

Source



       Name   Type                        Date   Date   Clinician        

 

       Amoxicil Propensi Active        Hives                        Un[facility]

s



       carine    ty to                       7-27                        ity of



              adverse                      00:00:                      [place]



              reaction                      00                          Medical



              s                                                       Branch

 

       AMOXICIL DRUG   Active        Hives                        [facility]



       CARINE    INGREDI                      7-27                        ity of



                                          00:00:                      [place]



                                          00                          Medical



                                                                      Branch

 

       Metoclop Propensi Active        Nausea 2017                      Univ

s



       ramide ty to                and/or 2-20                        ity of



       Hcl    adverse               Vomiting 00:00:                      [place]



              reaction                      00                          Medical



              s                                                       Branch

 

       METOCLOP DRUG   Active        N/V    2017                      [facility]



       RAMIDE INGREDI                      2-20                        ity of



       HCL                                00:00:                      [place]



                                          00                          Medical



                                                                      Branch

 

       Sulfamet Propensi Active        Hives                        [facility]



       hoxazole ty to                       6-11                        [facility]



       [facility] adverse                      00:00:                      Medical



       oprim  reaction                      00                          Center



              s                                                       

 

       Levoflox Propensi Active        Hives  2017-0                      [facility]



       acin   ty to                       611                        [facility]



              adverse                      00:00:                      Medical



              reaction                      00                          Center



              s                                                       

 

       Morphine Propensi Active        Hives  2017-0                      [facility]



              ty to                       6-11                        [facility]



              adverse                      00:00:                      Medical



              reaction                      00                          Center



              s                                                       

 

       Sesame Propensi Active        Hives  2017-0                      [facility]



       Seed   ty to                       6-11                        [facility]



              adverse                      00:00:                      Medical



              reaction                      00                          Center



              s                                                       

 

       Ketorola Propensi Active        Rash   2017-0                      [facility]



       c      ty to                       6                        [facility]



              adverse                      00:00:                      Medical



              reaction                      00                          Center



              s                                                       

 

       Vancomyc Propensi Active        Rash   2017-0                      [facility]



       in     ty to                       [facility]                        [facility]



       [employer] adverse                      00:00:                      Medical



       s      reaction                      00                          Center



              s                                                       

 

       Ondanset Propensi Active        Nausea And 2017-0                      [facility]



       evin Hcl ty to                Vomiting                         [facility]



       (Pf)   adverse                      00:00:                      Medical



              reaction                      00                          Center



              s                                                       

 

       SULFAMET Allergy Active High   Hives  2017-0                      [facility]



       HOXAZOLE                             6-11                        [facility]



       -TRIMETH                             00:00:                      Medical



       OPRIM                              00                          Center

 

       LEVOFLOX Allergy Active High   Hives  2017-0                      [facility]



       ACIN                               6-11                        [facility]



                                          00:00:                      Medical



                                          00                          Center

 

       MORPHINE Allergy Active High   Hives  2017-0                      [facility]



                                          6-11                        [facility]



                                          [facility]                      [facility]



                                          00                          Center

 

       KETOROLA Allergy Active High   Rash   2017-0                      [facility]



       C                                  6-11                        [facility]



                                          00:[facility]:                      [facility]



                                          00                          Center

 

       VANCOMYC Allergy Active High   Rash   2017-0                      [facility]



       IN                                 6-11                        [facility]



       ANALOGUE                             00:00:                      Medical



       S                                  00                          Center

 

       SESAME Allergy Active        Hives  2017-0                      [facility]



       SEED                               6-11                        [facility]



                                          00:00:                      Medical



                                          00                          Center

 

       ONDANSET Allergy Active        N\T\V  2017-0                      [facility]



       EVIN HCL                             6-11                        [facility]



       (PF)                               00:00:                      Medical



                                          00                          Center

 

       SESAME DRUG   Active        Hives  2017-0                      [facility]



       SEED   INGREDI                      6-11                        ity of



                                          00:00:                      [place]



                                          00                          Medical



                                                                      Branch

 

       Sulfa  Propensi Active        Other - See 2017-                      Uni

vers



       (Sulfona ty to                comments                         ity of



       mide   adverse                      00:00:                      [place]



       Antibiot reaction                      00                          Medica

l



       ics)   s                                                       Branch

 

       Sulfamet Propensi Active        Other - See 2017-0                      U

nivers



       hoprim ty to                comments 1-04                        ity of



       Ds     adverse                      00:00:                      [place]



              reaction                      00                          Medical



              s                                                       Branch

 

       Vancomyc Propensi Active        Other - See 2017-0                      U

nivers



       in     ty to                comments                         ity of



              adverse                      00:00:                      [place]



              reaction                      00                          Medical



              s                                                       Branch

 

       Sulfa  Propensi Active        Other - See                       Uni

vers



       (Sulfona ty to                comments                         ity of



       mide   adverse                      00:00:                      [place]



       Antibiot reaction                      00                          Medica

l



       ics)   s                                                       Branch

 

       SULFA  Drug   Active        Other-Cmnt                       Univer

s



       (SULFONA Class                       1-                        ity of



       MIDE                               00:00:                      [place]



       ANTIBIOT                             00                          Medical



       ICS)                                                           Branch

 

       SULFAMET DRUG   Active        Other-Cmnt                       Univ

ers



       HOPRIM                                                     ity of



       DS                                 00:00:                      [place]



                                          00                          Medical



                                                                      Branch

 

       VANCOMYC DRUG   Active        Other-Cmnt                       Univ

ers



       IN     INGREDI                                              ity of



                                          00:00:                      [place]



                                          00                          Medical



                                                                      Branch

 

       Sulfamet Propensi Active        Rash                         Univer

s



       hoxazole ty to                       7-19                        ity of



              adverse                      00:00:                      [place]



              reaction                      00                          Medical



              s                                                       Branch

 

       Ondanset Propensi Active        Nausea                       Univer

s



       evin Hcl ty to                and/or 7                        ity of



       (Pf)   adverse               Vomiting 00:00:                      [place]



              reaction                      00                          Medical



              s                                                       Branch

 

       SULFAMET DRUG   Active        Rash                         Univers



       HOXAZOLE INGREDI                      7-19                        ity of



                                          00:00:                      [place]



                                          00                          Medical



                                                                      Branch

 

       ONDANSET DRUG   Active        N/V    0                      Univers



       EVIN HCL                             7-19                        ity of



       (PF)                               00:00:                      [place]



                                          00                          Medical



                                                                      Branch

 

       Levoflox Propensi Active        Hives                        Univer

s



       acin   ty to                       5-23                        ity of



              adverse                      00:00:                      [place]



              reaction                      00                          Medical



              s                                                       Branch

 

       Morphine Propensi Active        Hives  0                      Univer

s



              ty to                       5-23                        ity of



              adverse                      00:00:                      [place]



              reaction                      00                          Medical



              s                                                       Branch

 

       Ketorola Propensi Active        Nausea 0                      Univer

s



       c      ty to                and/or 5-                        ity of



       Trometha adverse               Vomiting 00:00:                      [place]



       mine   reaction                      00                          Medical



              s                                                       Branch

 

       LEVOFLOX DRUG   Active        Hives                        Univers



       ACIN   INGREDI                      5-23                        ity of



                                          00:00:                      [place]



                                          00                          Medical



                                                                      Branch

 

       MORPHINE DRUG   Active        Hives                        Univers



              INGREDI                      5-23                        ity of



                                          00:00:                      [place]



                                          00                          Medical



                                                                      Branch

 

       KETOROLA DRUG   Active        N/V    0                      Univers



       C      INGREDI                      5-23                        ity of



       TROMETHA                             00:00:                      [place]



       MINE                               00                          Medical



                                                                      Branch

 

       morphine Drug   Active                                           Common



              allergy                                                  [facility]



                                                                      [facility]

 

       ondanset Drug   Active                                           Common



       evin    allergy                                                  [facility]

 

       [address]  Drug   Active                                           Common



              allergy                                                  [facility]

 

       amoxicil amoxicil Active                                           Mem[facility]

a



       carien    carine                                                     l



                                                                      [facility]

 

       morphine morphine Active                                           [facility]

a



                                                                      l



                                                                      [facility]

 

       Toradol Toradol Active                                           [facility]



                                                                      l



                                                                      [facility]

 

       Minocin Minocin Active                                           [facility]



                                                                      l



                                                                      [facility]

 

       Zofran Zofran Active                                           [facility]



                                                                      l



                                                                      [place]

 

       Levaquin Levaquin Active                                           Mem[facility]

a



                                                                      l



                                                                      [place]

 

       Bactrim Bactrim Active                                           [facility]



                                                                      l



                                                                      [place]

 

       Reglan Reglan Active                                           [facility]



                                                                      l



                                                                      [place]







Family History







           Family Member Diagnosis  Comments   Start Date Stop Date  Source

 

           Natural father Diabetes                                    [facility]

 

           Natural mother No Significant                                  Univer

sit of [place]



                      Medical Problems                                  Medical 

Branch







Social History







           Social Habit Start Date Stop Date  Quantity   Comments   Source

 

           History of tobacco                       Cigarette Smoker            

[facility]



                                                                  Branch

 

           History SDOH Social                                             Unive

rsity of



           [facility] Get                                             [place] [facility]

ical



           Together                                               Branch

 

           History SDOH Social                                             Unive

rsity of



           [facility] 

Medical



                                                                  Branch

 

           History SDOH Social                                             Unive

rsity of



           [facility]ctions                                             [place] [facility]



           Membership                                             Branch

 

           History SDOH Social                                             Unive

rsity of



           Connections                                             [place] [facility]



           Meetings                                               Branch

 

           Exposure to 2023 Not sure              University of



           SARS-CoV-2 (event) 00:00:00   14:24:00                         [facility]



                                                                  Branch

 

           Alcohol intake 2023 Current drinker            Unive

rsity of



                      00:00:00   00:00:00   of alcohol            [place] [facility]



                                            (finding)             Branch

 

           History SDOH 2023 1                     University o

f



           Alcohol Frequency 00:00:00   00:00:00                         [place] [facility]

edical



                                                                  Branch

 

           History SDOH 2023 0                     University o

f



           Alcohol Std Drinks 00:00:00   00:00:00                         [place] 

Medical



                                                                  Branch

 

           History SDOH 2023 1                     University o

f



           Alcohol Binge 00:00:00   00:00:00                         [place] [facility]

al



                                                                  Branch

 

           History SDOH Social 2023 4                     Unive

rsity of



           Connections Phone 00:00:00   00:00:00                         [place] [facility]

edical



                                                                  Branch

 

           History SDOH Social 2023 7                     Unive

rsity of



           Connections Living 00:00:00   00:00:00                         [place] 

Medical



                                                                  Branch

 

           History SDOH 2023 0                     University o

f



           Physical Activity 00:00:00   00:00:00                         [place] [facility]

edical



           DPW                                                    Branch

 

           History SDOH 2023 0                     University o

f



           Physical Activity 00:00:00   00:00:00                         [place] [facility]

edical



           MPS                                                    Branch

 

           History SDOH 2023 5                     University o

f



           Financial  00:00:00   00:00:00                         [place] [facility]



                                                                  Branch

 

           History SDOH Food 2023 1                     Univers

ity of



           Scarcity   00:00:00   00:00:00                         [place] [facility]



                                                                  Branch

 

           History SDOH 2023 2                     University o

f



           Transport Med 00:00:00   00:00:00                         [place] [facility]

al



                                                                  Branch

 

           History SDOH 2023 2                     University o

f



           Transport Non-Med 00:00:00   00:00:00                         [facility]

edical



                                                                  Branch

 

           History SDOH Food 2023 1                     Univers

ity of



           Worry      00:00:00   00:00:00                         [facility]



                                                                  Branch

 

           Education  2022 21                    University of



                      00:00:00   00:00:00                         [facility]

 

           Tobacco use and 2022-10-21 2022-10-21 Smokeless             Universit

y of



           exposure   00:00:00   00:00:00   tobacco non-user            [place] [facility]

dical



                                                                  Branch

 

           Alcohol Comment 2022 once a year            Universi

ty of



                      00:00:00   00:00:00                         [facility]

 

           Social History 2022                       [facility]

apolinar



                      05:57:00   05:57:00                         

 

           Cigarettes smoked 2017                       [facility]



           current (pack per 00:00:00   00:00:00                         Medical

 Center



           day) - Reported                                             

 

           Sex Assigned At 1985                       [facility]

kes



           B[facility]      00:00:00   [facility]:00                         [facility]









                Smoking Status  Start Date      Stop Date       Source

 

                Social History  2018 11:26:10                 [facility]

[facility]







Medications







       Ordered Filled Start  Stop   Current Ordering Indication Dosage Frequency

 Signature

                    Comments            Components          Source



     Medication Medication Date Date Medication? Clinician                (SIG) 

          



     Name Name                                                   

 

     FENTanyl PF      2023- No             12.5ug      12.5 mcg,         

  Univers



     (SUBLIMAZE                                Slow IV           ity o

f



     (PF))      23:15: 23:38                          Push,           [place]



     injection      00   :00                           ONCE, 1           Medical



     12.5 mcg                                         dose, On           Branch



                                                  23 at           



                                                  1815, STAT           

 

     NaCl 0.9%      2023- No             1000mL      at 999           Uni

vers



     (NS) bolus                                mL/hr,           ity of



     infusion      21:00: 23:29                          1,000 mL,           Eric

as



     1,000 mL      00   :00                           IV             Medical



                                                  Infusion,           Branch



                                                  ONCE, 1           



                                                  dose, On           



                                                  23 at           



                                                  1600, ASAP           

 

     FENTanyl PF      2023- No             25ug      25 mcg,           Un

yoselyn



     (SUBLIMAZE                                Slow IV           ity o

f



     (PF))      20:15: 20:34                          Push,           [place]



     injection      00   :00                           ONCE, 1           Medical



     25 mcg                                         dose, On           Branch



                                                  Fri            



                                                  23 at           



                                                  1515, STAT           

 

     blood sugar            Yes                      Use as           Univ

ers



     diagnostic      -25                               directed           ity o

f



     (ONETOUCH      00:00:                                              [place]



     VERIO TEST      00                                                Medical



     STRIPS)                                                        Branch



     strip                                                        

 

     insulin            Yes            60U       inject 60           Unive

rs



     lispro,      4-25                               Units           ity of



     human,      00:00:                               under the           [place]



     (HUMALOG      00                                 skin in           Medical



     U-100                                         the            Branch



     INSULIN)                                         morning           



     100 unit/mL                                         and 60           



     injection                                         Units in           



                                                  the            



                                                  evening.           



                                                  inject           



                                                  with           



                                                  meals.           

 

     blood sugar            Yes                      Use as           Univ

ers



     diagnostic      4-25                               directed           ity o

f



     (ONETOUCH      00:00:                                              [place]



     VERIO TEST      00                                                Medical



     STRIPS)                                                        Branch



     strip                                                        

 

     insulin            Yes            60U       inject 60           Unive

rs



     lispro,      4-25                               Units           ity of



     human,      00:00:                               under the           [place]



     (HUMALOG      00                                 skin in           Medical



     U-100                                         the            Branch



     INSULIN)                                         morning           



     100 unit/mL                                         and 60           



     injection                                         Units in           



                                                  the            



                                                  evening.           



                                                  inject           



                                                  with           



                                                  meals.           

 

     blood sugar            Yes                      Use as           Univ

ers



     diagnostic      4-25                               directed           ity o

f



     (ONETOUCH      00:00:                                              [place]



     VERIO TEST      00                                                Medical



     STRIPS)                                                        Branch



     strip                                                        

 

     insulin            Yes            60U       inject 60           Unive

rs



     lispro,      4-25                               Units           ity of



     human,      00:00:                               under the           [place]



     (HUMALOG      00                                 skin in           Medical



     U-100                                         the            Branch



     INSULIN)                                         morning           



     100 unit/mL                                         and 60           



     injection                                         Units in           



                                                  the            



                                                  evening.           



                                                  inject           



                                                  with           



                                                  meals.           

 

     blood sugar            Yes                      Use as           Univ

ers



     diagnostic      4-25                               directed           ity o

f



     (ONETOUCH      00:00:                                              [place]



     VERIO TEST      00                                                Medical



     STRIPS)                                                        Branch



     strip                                                        

 

     insulin            Yes            60U       inject 60           Unive

rs



     lispro,      4-25                               Units           ity of



     human,      00:00:                               under the           [place]



     (HUMALOG      00                                 skin in           Medical



     U-100                                         the            Branch



     INSULIN)                                         morning           



     100 unit/mL                                         and 60           



     injection                                         Units in           



                                                  the            



                                                  evening.           



                                                  inject           



                                                  with           



                                                  meals.           

 

     blood sugar            Yes                      Use as           Univ

ers



     diagnostic      4-25                               directed           ity o

f



     (ONETOUCH      00:00:                                              [place]



     VERIO TEST      00                                                Medical



     STRIPS)                                                        Branch



     strip                                                        

 

     insulin            Yes            60U       inject 60           Unive

rs



     lispro,      4-25                               Units           ity of



     human,      00:00:                               under the           [place]



     (HUMALOG      00                                 skin in           Medical



     U-100                                         the            Branch



     INSULIN)                                         morning           



     100 unit/mL                                         and 60           



     injection                                         Units in           



                                                  the            



                                                  evening.           



                                                  inject           



                                                  with           



                                                  meals.           

 

     semaglutide      2023- Yes                      inject           Uni

vers



     (OZEMPIC)      -                          0.25 mg           ity of



     0.25 mg or      00:00: 04:59                          under the           [facility]

[facility]



     0.5 mg (2      00   :00                           skin           Medical



     mg/3 mL)                                         weekly for           Branc

h



     PnIj                                         28 days,           



                                                  THEN 0.5           



                                                  mg weekly           



                                                  for 56           



                                                  days.           

 

     semaglutide      2023- Yes                      inject           Uni

vers



     (OZEMPIC)      -                          0.25 mg           ity of



     0.25 mg or      00:00: 04:59                          under the           [facility]

[facility]



     0.5 mg (2      00   :00                           skin           Medical



     mg/3 mL)                                         weekly for           [facility]

h



     PnIj                                         28 days,           



                                                  THEN 0.5           



                                                  mg weekly           



                                                  for 56           



                                                  days.           

 

     semaglutide      2023- Yes                      inject           Uni

vers



     (OZEMPIC)      -                          0.25 mg           ity of



     0.25 mg or      00:00: 04:59                          under the           T

ex[facility]



     0.5 mg (2      00   :00                           skin           Medical



     mg/3 mL)                                         weekly for           [facility]

h



     PnIj                                         28 days,           



                                                  THEN 0.5           



                                                  mg weekly           



                                                  for 56           



                                                  days.           

 

     semaglutide      2023- Yes                      inject           Uni

vers



     (OZEMPIC)      -19                          0.25 mg           ity of



     0.25 mg or      00:00: 04:59                          under the           T

[facility]



     0.5 mg (2      00   :00                           skin           Medical



     mg/3 mL)                                         weekly for           [facility]

h



     PnIj                                         28 days,           



                                                  THEN 0.5           



                                                  mg weekly           



                                                  for 56           



                                                  days.           

 

     semaglutide      2023- Yes                      inject           Uni

vers



     (OZEMPIC)      -                          0.25 mg           ity of



     0.25 mg or      00:00: 04:59                          under the           T

exas



     0.5 mg (2      00   :00                           skin           Medical



     mg/3 mL)                                         weekly for           [facility]

h



     PnIj                                         28 days,           



                                                  THEN 0.5           



                                                  mg weekly           



                                                  for 56           



                                                  days.           

 

     insulin NPH      2023- No             45U       inject 45           

[facility]



     human      4-20 04-20                          Units           ity of



     isophane      20:12: 00:00                          under the           [facility]

as



     (NOVOLIN N      01   :00                           skin 2           Medical



     SC)                                          (two)           Branch



                                                  times           



                                                  daily.           

 

     insulin NPH            Yes       [id number] 60U       inject 60        

   [facility]



     100 unit/mL      4-20                               Units           ity of



     injection      00:00:                               under the           [facility]

as



               00                                 skin every           Medical



                                                  morning           [facility]anch



                                                  and            



                                                  evening.           

 

     insulin NPH      0      Yes       [id number] 60U       inject 60        

   [facility]



     100 unit/mL      4-20                               Units           ity of



     injection      00:00:                               under the           [facility]

as



               00                                 skin every           Medical



                                                  morning           [facility]



                                                  and            



                                                  evening.           

 

     insulin NPH      -0      Yes       [id number] 60U       inject 60        

   [facility]



     100 unit/mL      4-20                               Units           ity of



     injection      00:00:                               under the           [facility]

as



               00                                 skin every           Medical



                                                  morning           [facility]



                                                  and            



                                                  evening.           

 

     insulin NPH      0      Yes       [id number] 60U       inject 60        

   [facility]



     100 unit/mL      4-20                               Units           ity of



     injection      00:00:                               under the           [facility]

as



               00                                 skin every           Medical



                                                  morning           [facility]anch



                                                  and            



                                                  evening.           

 

     insulin NPH      -0      Yes       [id number] 60U       inject 60        

   [facility]



     100 unit/mL      4-20                               Units           ity of



     injection      00:00:                               under the           [facility]

as



               00                                 skin every           Medical



                                                  morning           [facility]



                                                  and            



                                                  evening.           

 

     insulin NPH      -0      Yes       [id number] 60U       inject 60        

   [facility]



     100 unit/mL      4-20                               Units           ity of



     injection      00:00:                               under the           [facility]

as



               00                                 skin every           Medical



                                                  morning           [facility]



                                                  and            



                                                  evening.           

 

     insulin NPH            Yes       [id number] 60U       inject 60        

   [facility]



     100 unit/mL      4-20                               Units           ity of



     injection      00:00:                               under the           [facility]

as



               00                                 skin every           Medical



                                                  morning           [facility]



                                                  and            



                                                  evening.           

 

     cephALEXin      2023- No             500mg      500 mg,           Un

[facility]



     (KEFLEX)                                Oral,           ity of



     capsule 500      23:45: 23:53                          ONCE, 1           [facility]

[facility]



     mg        00   :00                           dose, On           Medical



                                                  Wed            Branch



                                                  23 at           



                                                  1845,           



                                                  ASAP<br>Re           



                                                  ason for           



                                                  Anti-Infec           



                                                  tive:           



                                                  Documented           



                                                  Infection<           



                                                  br>Documen           



                                                  huma            



                                                  Infection           



                                                  Site:           



                                                  Urine<br>D           



                                                  uration of           



                                                  Therapy:           



                                                  Other (see           



                                                  Comments)           

 

     insulin      2023- No             6U        6 Units,           Unive

rs



     regular                                Slow IV           ity of



     human      23:15: 22:41                          Push,           [place]



     (HUMULIN R)      00   :00                           ONCE, 1           Medic

al



     injection 6                                         dose, On           [facility]

ch



     Units                                         Wed            



                                                  23 at           



                                                  1815,           



                                                  STAT<br>In           



                                                  dication           



                                                  for            



                                                  insulin:           



                                                  Hyperglyce           



                                                  jarvis            

 

     NaCl 0.9%      2023- No             1000mL      at 999           Uni

vers



     (NS) bolus                                mL/hr,           ity of



     infusion      22:30: 23:56                          1,000 mL,           [facility]

as



     1,000 mL      00   :00                           IV             Medical



                                                  Infusion,           [facility]



                                                  ONCE, 1           



                                                  dose, On           



                                                  23 at           



                                                  1730, STAT           

 

     acetaminoph      2023- No             1000mg      1,000 mg,         

  [facility]



     en                                  Oral,           ity of



     (TYLENOL)      21:45: 21:38                          ONCE, 1           [place]

[facility]



     tablet      00   :00                           dose, On           Medical



     1,000 mg                                         Wed            [facility]



                                                  23 at           



                                                  1645, ASAP           

 

     NaCl 0.9%      2023- No             1000mL      at 999           Uni

vers



     (NS) bolus                                mL/hr,           ity of



     infusion      21:45: 23:56                          1,000 mL,           [facility]

as



     1,000 mL      00   :00                           IV             Medical



                                                  Infusion,           [facility]



                                                  ONCE, 1           



                                                  dose, On           



                                                  23 at           



                                                  1645, STAT           

 

     cephALEXin            Yes       [id number] 500mg      Take 1           

[facility]



     (KEFLEX)      4-19                               capsule by           ity o

f



     500 mg      00:00:                               mouth 4           [place]



     capsule      00                                 (four)           Medical



                                                  times           Branch



                                                  daily.           

 

     cephALEXin      2023-0      Yes       [id number] 500mg      Take 1           

[facility]



     (KEFLEX)      4-19                               capsule by           ity o

f



     500 mg      00:00:                               mouth 4           [place]



     capsule      00                                 (four)           Medical



                                                  times           [facility]



                                                  daily.           

 

     cephALEXin      2023-0      Yes       [id number] 500mg      Take 1           

[facility]



     (KEFLEX)      4-19                               capsule by           ity o

f



     500 mg      00:00:                               mouth 4           [place]



     capsule      00                                 (four)           Medical



                                                  times           [facility]



                                                  daily.           

 

     cephALEXin      2023-0      Yes       [id number] 500mg      Take 1           

[facility]



     (KEFLEX)      4-19                               capsule by           ity o

f



     500 mg      00:00:                               mouth 4           [place]



     capsule      00                                 (four)           Medical



                                                  times           [facility]



                                                  daily.           

 

     cephALEXin      2023-0      Yes       [id number] 500mg      Take 1           

[facility]



     (KEFLEX)      4-19                               capsule by           ity o

f



     500 mg      00:00:                               mouth 4           [place]



     capsule      00                                 (four)           Medical



                                                  times           [facility]



                                                  daily.           

 

     cephALEXin      2023-0      Yes       [id number] 500mg      Take 1           

[facility]



     (KEFLEX)      4-19                               capsule by           ity o

f



     500 mg      00:00:                               mouth 4           [place]



     capsule      00                                 (four)           Medical



                                                  times           [facility]



                                                  daily.           

 

     cephALEXin      2023-0      Yes       [id number] 500mg      Take 1           

[facility]



     (KEFLEX)      4-19                               capsule by           ity o

f



     500 mg      00:00:                               mouth 4           [place]



     capsule      00                                 (four)           Medical



                                                  times           [facility]



                                                  daily.           

 

     cephALEXin      2023-0      Yes       [id number] 500mg      Take 1           

[facility]



     (KEFLEX)      4-19                               capsule by           ity o

f



     500 mg      00:00:                               mouth 4           [place]



     capsule      00                                 (four)           Medical



                                                  times           [facility]



                                                  daily.           

 

     cefpodoxime      -0      Yes            200mg      Take 1           Uni

vers



     200 mg      4-16                               tablet by           ity of



     tablet      00:00:                               mouth in           [place]



               00                                 the            Medical



                                                  morning           [facility]anch



                                                  and 1           



                                                  tablet in           



                                                  the            



                                                  evening.           

 

     FENTanyl PF      -2023- No             50ug      50 mcg,           Un

[facility]



     (SUBLIMAZE      4-10 04-10                          Slow IV           ity o

f



     (PF))      02:15: 02:39                          Push,           [place]



     injection      00   :00                           ONCE, 1           Medical



     50 mcg                                         dose, On           [facility]



                                                  Sun 23           



                                                  at 2115,           



                                                  Routine           

 

     insulin      -0 - No             14U       14 Units,           Univ

ers



     regular      4-10 04-10                          Subcutaneo           ity o

f



     human      01:30: 00:59                          us, ONCE,           [place]



     (HUMULIN R)      00   :00                           1 dose, On           Me

dical



     injection                                         Sun 23           [facility]

ch



     14 Units                                         at 2030,           



                                                  Routine<br           



                                                  >Indicatio           



                                                  n for           



                                                  insulin:           



                                                  Hyperglyce           



                                                  jarvis            

 

     cefTRIAXone      2023- No             1000mg      1,000 mg,         

  [facility]



     (ROCEPHIN)      4-10 04-10                          IV             ity of



     1,000 mg in      01:00: 01:31                          Piggyback,          

 [place]



     NaCl 0.9%      00   :00                           ONCE, 1           Medical



     (NS) 100 mL                                         dose, On           [facility]

ch



     MINI-BAG                                         Sun 23           



                                                  at 2000,           



                                                  Administer           



                                                  over 30           



                                                  Minutes,           



                                                  100            



                                                  mL<br>Reas           



                                                  on for           



                                                  Anti-Infec           



                                                  tive:           



                                                  Documented           



                                                  Infection<           



                                                  br>Documen           



                                                  huma            



                                                  Infection           



                                                  Site:           



                                                  Urine<br&g           



                                                  t;Duration           



                                                  of             



                                                  Therapy: 7           



                                                  days           

 

     NaCl 0.9%      2023- No             500mL      at 999           Univ

ers



     (NS) bolus      4-10 04-10                          mL/hr, 500           it

y of



     infusion      01:00: 02:45                          mL, IV           [place]



     500 mL      00   :00                           Infusion,           Medical



                                                  ONCE, 1           [facility]



                                                  dose, On           



                                                  Sun 23           



                                                  at 2000,           



                                                  STAT           

 

     NaCl 0.9%      2023- No             1000mL      at 999           Uni

vers



     (NS) bolus       04-10                          mL/hr,           ity of



     infusion      23:15: 03:46                          1,000 mL,           [facility]

as



     1,000 mL      00   :00                           IV             Medical



                                                  Infusion,           [facility]



                                                  ONCE, 1           



                                                  dose, On           



                                                  Sun 23           



                                                  at 1815,           



                                                  ASAP           

 

     FENTanyl PF      2023- No             50ug      50 mcg,           Un

[facility]



     (SUBLIMAZE      -                          Slow IV           ity o

f



     (PF))      23:15: 23:11                          Push,           [place]



     injection      00   :00                           ONCE, 1           Medical



     50 mcg                                         dose, On           [facility]



                                                  Sun 23           



                                                  at 1815,           



                                                  Routine           

 

     insulin NPH            Yes            45U       inject 45           U

nivers



     human      4-09                               Units           ity of



     isophane      21:52:                               under the           [place]

[facility]



     (NOVOLIN N      29                                 skin 2           Medical



     SC)                                          (two)           [facility]



                                                  times           



                                                  daily.           

 

     insulin NPH            Yes            45U       inject 45           U

nivers



     human      4-09                               Units           ity of



     isophane      21:52:                               under the           [place]

[facility]



     (NOVOLIN N      29                                 skin 2           Medical



     SC)                                          (two)           [facility]



                                                  times           



                                                  daily.           

 

     insulin NPH      2023-0      Yes            45U       inject 45           U

nivers



     human      4-09                               Units           ity of



     isophane      21:52:                               under the           [place]

[facility]



     (NOVOLIN N      29                                 skin 2           Medical



     SC)                                          (two)           [facility]



                                                  times           



                                                  daily.           

 

     insulin NPH      2023-0      Yes            45U       inject 45           U

nivers



     human      4-09                               Units           ity of



     isophane      21:52:                               under the           [place]

[facility]



     (NOVOLIN N      29                                 skin 2           Medical



     SC)                                          (two)           [facility]



                                                  times           



                                                  daily.           

 

     insulin NPH      2023-0      Yes            45U       inject 45           U

nivers



     human      4-09                               Units           ity of



     isophane      21:52:                               under the           [place]

[facility]



     (NOVOLIN N      29                                 skin 2           Medical



     SC)                                          (two)           [facility]



                                                  times           



                                                  daily.           

 

     insulin NPH      2023-0      Yes            45U       inject 45           U

nivers



     human      4-09                               Units           ity of



     isophane      21:52:                               under the           [place]

[facility]



     (NOVOLIN N      29                                 skin 2           Medical



     SC)                                          (two)           [facility]



                                                  times           



                                                  daily.           

 

     insulin NPH      2023-0      Yes            45U       inject 45           U

nivers



     human      4-09                               Units           ity of



     isophane      21:52:                               under the           [place]

[facility]



     (NOVOLIN N      29                                 skin 2           Medical



     SC)                                          (two)           [facility]



                                                  times           



                                                  daily.           

 

     insulin NPH      2023-0      Yes            45U       inject 45           U

nivers



     human      4-09                               Units           ity of



     isophane      21:52:                               under the           [place]

[facility]



     (NOVOLIN N      29                                 skin 2           Medical



     SC)                                          (two)           [facility]



                                                  times           



                                                  daily.           

 

     insulin NPH      2023-0      Yes            45U       inject 45           U

nivers



     human      4-09                               Units           ity of



     isophane      21:52:                               under the           [place]

[facility]



     (NOVOLIN N      29                                 skin 2           Medical



     SC)                                          (two)           [facility]



                                                  times           



                                                  daily.           

 

     insulin NPH      2023-0      Yes       [id number] 45U       inject 45        

   [facility]



     100 unit/mL      4-09                               Units           ity of



     injection      00:00:                               under the           [facility]

as



               00                                 skin every           Medical



                                                  morning           [facility]



                                                  and            



                                                  evening.           

 

     insulin NPH      2023-0      Yes       [id number] 45U       inject 45        

   [facility]



     100 unit/mL      4-09                               Units           ity of



     injection      00:00:                               under the           [facility]

as



               00                                 skin every           Medical



                                                  morning           [facility]



                                                  and            



                                                  evening.           

 

     insulin NPH      2023-0      Yes       [id number] 45U       inject 45        

   [facility]



     100 unit/mL      4-09                               Units           ity of



     injection      00:00:                               under the           [facility]

as



               00                                 skin every           Medical



                                                  morning           [facility]



                                                  and            



                                                  evening.           

 

     insulin NPH      -0      Yes       [id number] 45U       inject 45        

   [facility]



     100 unit/mL      4-09                               Units           ity of



     injection      00:00:                               under the           [facility]

as



               00                                 skin every           Medical



                                                  morning           [facility]



                                                  and            



                                                  evening.           

 

     insulin NPH      -0      Yes       [id number] 45U       inject 45        

   [facility]



     100 unit/mL      4-09                               Units           ity of



     injection      00:00:                               under the           [facility]

as



               00                                 skin every           Medical



                                                  morning           [facility]



                                                  and            



                                                  evening.           

 

     insulin NPH      -0      Yes       [id number] 45U       inject 45        

   [facility]



     100 unit/mL      4-09                               Units           ity of



     injection      00:00:                               under the           [facility]

as



               00                                 skin every           Medical



                                                  morning           [facility]



                                                  and            



                                                  evening.           

 

     insulin NPH      -0      Yes       [id number] 45U       inject 45        

   [facility]



     100 unit/mL      4-09                               Units           ity of



     injection      00:00:                               under the           [facility]

as



               00                                 skin every           Medical



                                                  morning           [facility]



                                                  and            



                                                  evening.           

 

     insulin NPH      -0      Yes       [id number] 45U       inject 45        

   [facility]



     100 unit/mL      4-09                               Units           ity of



     injection      00:00:                               under the           [facility]

as



               00                                 skin every           Medical



                                                  morning           [facility]



                                                  and            



                                                  evening.           

 

     insulin NPH      0      Yes       [id number] 45U       inject 45        

   [facility]



     100 unit/mL      4-09                               Units           ity of



     injection      00:00:                               under the           [facility]

as



               00                                 skin every           Medical



                                                  morning           [facility]



                                                  and            



                                                  evening.           

 

     insulin NPH      2023- No        [id number] 45U       inject 45       

    [facility]



     100 unit/mL      4 04-20                          Units           ity of



     injection      00:00: 00:00                          under the           [facility]

[place]



               00   :00                           skin every           [facility]



                                                  morning           [facility]



                                                  and            



                                                  evening.           

 

     cefpodoxime      2023- Yes       [id number] 200mg      Take 1        

   [facility]



     200 mg                                tablet by           ity of



     tablet      00:00: 04:59                          mouth in           [place]



               00   :00                           the            Medical



                                                  morning           [facility]



                                                  and 1           



                                                  tablet in           



                                                  the            



                                                  evening.           



                                                  Do all           



                                                  this for 7           



                                                  days.           

 

     cefpodoxime      2023- Yes       [id number] 200mg      Take 1        

   [facility]



     200 mg                                tablet by           ity of



     tablet      00:00: 04:59                          mouth in           [place]



               00   :00                           the            Medical



                                                  morning           [facility]



                                                  and 1           



                                                  tablet in           



                                                  the            



                                                  evening.           



                                                  Do all           



                                                  this for 7           



                                                  days.           

 

     cefTRIAXone      -2023- No             1000mg      1,000 mg,         

  [facility]



     (ROCEPHIN)      3-25 03-25                          IV             ity of



     1,000 mg in      00:00: 00[place]



     NaCl 0.9%      00   :00                           ONCE, 1           Medical



     (NS) 100 mL                                         dose, On           [facility]

ch



     MINI-BAG                                         Fri            



                                                  3/24/23 at           



                                                  1900,           



                                                  Administer           



                                                  over 30           



                                                  Minutes,           



                                                  100            



                                                  mL<br>Reas           



                                                  on for           



                                                  Anti-Infec           



                                                  tive:           



                                                  Documented           



                                                  Infection<           



                                                  br>Documen           



                                                  huma            



                                                  Infection           



                                                  Site:           



                                                  Urine<br&g           



                                                  t;Duration           



                                                  of             



                                                  Therapy: 7           



                                                  days           

 

     NaCl 0.9%      2023- No             1000mL      at 999           Uni

vers



     (NS) bolus      3-24 03-25                          mL/hr,           ity of



     infusion      23:45: 01:32                          1,000 mL,           [facility]

as



     1,000 mL      00   :00                           IV             Medical



                                                  Piggyback,           [facility]



                                                  ONCE, 1           



                                                  dose, On           



                                                  Fri            



                                                  3/24/23 at           



                                                  1845, STAT           

 

     insulin      2023- No             10U       10 Units,           Univ

ers



     regular      3-24 03-24                          Subcutaneo           ity o

f



     human      23:45: 23:08                          , ONCE,           [place]



     (HUMULIN R)      00   :00                           1 dose, On           Me

dical



     injection                                         Fri            [facility]



     10 Units                                         3/24/23 at           



                                                  1845,           



                                                  Routine<br           



                                                  >Indicatio           



                                                  n for           



                                                  insulin:           



                                                  Hyperglyce           



                                                  jarvis            

 

     iopamidol      2023- No        [id number] 100mL      100 mL,          

 [facility]



     (ISOVUE      3-24 03-24                          Intravenou           ity o

f



     370-500 mL)      23:15: 22:22                          s, ONCE, 1          

 [place]



     injection      00   :00                           dose, On           Medica

l



     100 mL                                         Fri            [facility]



                                                  3/24/23 at           



                                                  1815,           



                                                  Routine           

 

     FENTanyl PF      2023- No             50ug      50 mcg,           Un

[facility]



     (SUBLIMAZE      3-24 03-24                          Slow IV           ity o

f



     (PF))      22:15: 21:42                          Push,           [place]



     injection      00   :00                           ONCE, 1           Medical



     50 mcg                                         dose, On           [facility]



                                                  Fri            



                                                  3/24/23 at           



                                                  1715,           



                                                  Routine           

 

     NaCl 0.9%      2023- No             1000mL      at 999           Uni

vers



     (NS) bolus      3-24 03-25                          mL/hr,           ity of



     infusion      22:00: 01:32                          1,000 mL,           [facility]

as



     1,000 mL      00   :00                           IV             Medical



                                                  Infusion,           [facility]



                                                  ONCE, 1           



                                                  dose, On           



                                                  Fri            



                                                  3/24/23 at           



                                                  1700, ASAP           

 

     proMETHazin      2023- No             25mg      25 mg, IV           

[facility]



     e         3-24 03-24                          Piggyback,           ity of



     (PHENERGAN)      21:15: 21:46                          ONCE, 1           [facility]

[facility]



     25 mg in      00   :00                           dose, On           Medical



     NaCl 0.9%                                         Fri            [facility]



     (NS) 50 mL                                         3/24/23 at           



     IV                                           1615, ASAP           



     piggyback                                                        

 

     cefdinir      -0 - Yes       [id number] 300mg      Take 1           

[facility]



     300 mg      3-24 04-08                          capsule by           ity of



     capsule      00:00: 04:59                          mouth           [place]



               00   :00                           every 12           Medical



                                                  (twelve)           [facility]



                                                  hours for           



                                                  14 days.           

 

     cefdinir      -0 - Yes       [id number] 300mg      Take 1           

[facility]



     300 mg      3-24 04-08                          capsule by           ity of



     capsule      00:00: 04:59                          mouth           [place]



               00   :00                           every 12           Medical



                                                  (twelve)           [facility]



                                                  hours for           



                                                  14 days.           

 

     cefdinir      -0 - Yes       [id number] 300mg      Take 1           

[facility]



     300 mg      3-24 04-08                          capsule by           ity of



     capsule      00:00: 04:59                          mouth           [place]



               00   :00                           every 12           Medical



                                                  (twelve)           [facility]



                                                  hours for           



                                                  14 days.           

 

     sodium      0      Yes                      Topical,           [facility]



     hypochlorit      2-23                               TID, First           it

y of



     e 0.25%      20:00:                               dose on           [place]



     (DAKIN'S      00                                 u            Medical



     SOLUTION)                                         23 at           [facility]

ch



     solution                                         1400,           



                                                  Until           



                                                  Discontinu           



                                                  ed,            



                                                  Routine           

 

     sodium      0      Yes                      Topical,           [facility]



     hypochlorit      2-23                               TID, [facility]           it

y of



     e 0.25%      20:00:                               dose on           [place]



     (DAKIN'S      00                                 u            Medical



     SOLUTION)                                         23 at           [facility]

ch



     solution                                         1400,           



                                                  Until           



                                                  Discontinu           



                                                  ed,            



                                                  Routine           

 

     HYDROmorpho      0      Yes            4mg       Take 4 mg           U

nivers



     ne 4 mg      2-23                               by mouth           ity of



     tablet      14:27:                               in the           [place]



               27                                 morning           Medical



                                                  and 4 mg           [facility]



                                                  at noon           



                                                  and 4 mg           



                                                  in the           



                                                  evening.           

 

     insulin NPH      0      Yes            45U       inject 45           U

nivers



     human      2-23                               Units           ity of



     isophane      14:27:                               under the           [place]

s



     (NOVOLIN N      27                                 skin 2           Medical



     SC)                                          (two)           [facility]



                                                  times           



                                                  daily.           

 

     HYDROmorpho      -0      Yes            4mg       Take 4 mg           U

nivers



     ne 4 mg      2-23                               by mouth           ity of



     tablet      14:27:                               in the           [place]



               27                                 morning           Medical



                                                  and 4 mg           [facility]



                                                  at noon           



                                                  and 4 mg           



                                                  in the           



                                                  evening.           

 

     insulin NPH      2023-0      Yes            45U       inject 45           U

nivers



     human      2-23                               Units           ity of



     isophane      14:27:                               under the           [place]

s



     (NOVOLIN N      27                                 skin 2           Medical



     SC)                                          (two)           [facility]



                                                  times           



                                                  daily.           

 

     HYDROmorpho      2023-0      Yes            4mg       Take 4 mg           U

nivers



     ne 4 mg      2-23                               by mouth           ity of



     tablet      14:27:                               in the           [place]



               27                                 morning           Medical



                                                  and 4 mg           [facility]



                                                  at noon           



                                                  and 4 mg           



                                                  in the           



                                                  evening.           

 

     insulin NPH      2023-0      Yes            45U       inject 45           U

nivers



     human      2-23                               Units           ity of



     isophane      14:27:                               under the           [place]

s



     (NOVOLIN N      27                                 skin 2           Medical



     SC)                                          (two)           [facility]



                                                  times           



                                                  daily.           

 

     HYDROmorpho      2023-0      Yes            4mg       Take 4 mg           U

nivers



     ne 4 mg      2-23                               by mouth           ity of



     tablet      14:27:                               in the           [place]



               27                                 morning           Medical



                                                  and 4 mg           [facility]



                                                  at noon           



                                                  and 4 mg           



                                                  in the           



                                                  evening.           

 

     insulin NPH      2023-0      Yes            45U       inject 45           U

nivers



     human      2-23                               Units           ity of



     isophane      14:27:                               under the           [place]

s



     (NOVOLIN N      27                                 skin 2           Medical



     SC)                                          (two)           [facility]



                                                  times           



                                                  daily.           

 

     HYDROmorpho      2023-0      Yes            4mg       Take 4 mg           U

nivers



     ne 4 mg      2-23                               by mouth           ity of



     tablet      14:27:                               in the           [place]



               27                                 morning           Medical



                                                  and 4 mg           [facility]



                                                  at noon           



                                                  and 4 mg           



                                                  in the           



                                                  evening.           

 

     insulin NPH      2023-0      Yes            45U       inject 45           U

nivers



     human      2-23                               Units           ity of



     isophane      14:27:                               under the           [place]

s



     (NOVOLIN N      27                                 skin 2           Medical



     SC)                                          (two)           [facility]



                                                  times           



                                                  daily.           

 

     HYDROmorpho      2023-0      Yes            4mg       Take 4 mg           U

nivers



     ne 4 mg      2-23                               by mouth           ity of



     tablet      14:27:                               in the           [place]



               27                                 morning           Medical



                                                  and 4 mg           [facility]



                                                  at noon           



                                                  and 4 mg           



                                                  in the           



                                                  evening.           

 

     HYDROmorpho      2023-0      Yes            4mg       Take 4 mg           U

nivers



     ne 4 mg      2-23                               by mouth           ity of



     tablet      14:27:                               in the           [place]



               27                                 morning           Medical



                                                  and 4 mg           [facility]



                                                  at noon           



                                                  and 4 mg           



                                                  in the           



                                                  evening.           

 

     HYDROmorpho      2023-0      Yes            4mg       Take 4 mg           U

nivers



     ne 4 mg      2-23                               by mouth           ity of



     tablet      14:27:                               in the           [place]



               27                                 morning           Medical



                                                  and 4 mg           [facility]



                                                  at noon           



                                                  and 4 mg           



                                                  in the           



                                                  evening.           

 

     HYDROmorpho      2023-0      Yes            4mg       Take 4 mg           U

nivers



     ne 4 mg      2-23                               by mouth           ity of



     tablet      14:27:                               in the           [place]



               27                                 morning           Medical



                                                  and 4 mg           [facility]



                                                  at noon           



                                                  and 4 mg           



                                                  in the           



                                                  evening.           

 

     HYDROmorpho      2023-0      Yes            4mg       Take 4 mg           U

nivers



     ne 4 mg      2-23                               by mouth           ity of



     tablet      14:27:                               in the           [place]



               27                                 morning           Medical



                                                  and 4 mg           [facility]



                                                  at noon           



                                                  and 4 mg           



                                                  in the           



                                                  evening.           

 

     HYDROmorpho      2023-0      Yes            4mg       Take 4 mg           U

nivers



     ne 4 mg      2-23                               by mouth           ity of



     tablet      14:27:                               in the           [place]



               27                                 morning           Medical



                                                  and 4 mg           [facility]



                                                  at noon           



                                                  and 4 mg           



                                                  in the           



                                                  evening.           

 

     HYDROmorpho      2023-0      Yes            4mg       Take 4 mg           U

nivers



     ne 4 mg      2-23                               by mouth           ity of



     tablet      14:27:                               in the           [place]



               27                                 morning           Medical



                                                  and 4 mg           [facility]



                                                  at noon           



                                                  and 4 mg           



                                                  in the           



                                                  evening.           

 

     HYDROmorpho      2023-0      Yes            4mg       Take 4 mg           U

nivers



     ne 4 mg      2-23                               by mouth           ity of



     tablet      14:27:                               in the           [place]



               27                                 morning           Medical



                                                  and 4 mg           [facility]



                                                  at noon           



                                                  and 4 mg           



                                                  in the           



                                                  evening.           

 

     HYDROmorpho      2023-0      Yes            4mg       Take 4 mg           U

nivers



     ne 4 mg      2-23                               by mouth           ity of



     tablet      14:27:                               in the           [place]



               27                                 morning           Medical



                                                  and 4 mg           [facility]



                                                  at noon           



                                                  and 4 mg           



                                                  in the           



                                                  evening.           

 

     HYDROmorpho      2023-0      Yes            4mg       Take 4 mg           U

nivers



     ne 4 mg      2-23                               by mouth           ity of



     tablet      14:27:                               in the           [place]



               27                                 morning           Medical



                                                  and 4 mg           [facility]



                                                  at noon           



                                                  and 4 mg           



                                                  in the           



                                                  evening.           

 

     HYDROmorpho      2023-0      Yes            4mg       Take 4 mg           U

nivers



     ne 4 mg      2-23                               by mouth           ity of



     tablet      14:27:                               in the           [place]



               27                                 morning           Medical



                                                  and 4 mg           [facility]



                                                  at noon           



                                                  and 4 mg           



                                                  in the           



                                                  evening.           

 

     HYDROmorpho      2023-0      Yes            4mg       Take 4 mg           U

nivers



     ne 4 mg      2-23                               by mouth           ity of



     tablet      14:27:                               in the           [place]



               27                                 morning           Medical



                                                  and 4 mg           [facility]



                                                  at noon           



                                                  and 4 mg           



                                                  in the           



                                                  evening.           

 

     HYDROmorpho      2023-0      Yes            4mg       Take 4 mg           U

nivers



     ne 4 mg      2-23                               by mouth           ity of



     tablet      14:27:                               in the           [place]



               27                                 morning           Medical



                                                  and 4 mg           [facility]



                                                  at noon           



                                                  and 4 mg           



                                                  in the           



                                                  evening.           

 

     HYDROmorpho      2023-0      Yes            4mg       Take 4 mg           U

nivers



     ne 4 mg      2-23                               by mouth           ity of



     tablet      14:27:                               in the           [place]



               27                                 morning           Medical



                                                  and 4 mg           [facility]



                                                  at noon           



                                                  and 4 mg           



                                                  in the           



                                                  evening.           

 

     HYDROmorpho      2023-0      Yes            4mg       Take 4 mg           U

nivers



     ne 4 mg      2-23                               by mouth           ity of



     tablet      14:27:                               in the           [place]



               27                                 morning           Medical



                                                  and 4 mg           [facility]



                                                  at noon           



                                                  and 4 mg           



                                                  in the           



                                                  evening.           

 

     HYDROmorpho      2023-0      Yes            4mg       Take 4 mg           U

nivers



     ne 4 mg      2-23                               by mouth           ity of



     tablet      14:27:                               in the           [place]



               27                                 morning           Medical



                                                  and 4 mg           [facility]



                                                  at noon           



                                                  and 4 mg           



                                                  in the           



                                                  evening.           

 

     HYDROmorpho      2023-0 2023- Yes            1mg       1 mg, Slow          

 [facility]



     ne                                  IV Push,           ity of



     (DILAUDID)      11:09: 11:08                          Q4HPRN,           [facility]

as



     injection 1      37   :37                           Starting           Medi

[facility]



     mg                                           on Thu           [facility]



                                                  23 at           



                                                  0509,           



                                                  Until Sat           



                                                  23 at           



                                                  0508,           



                                                  Routine,           



                                                  Pain           



                                                  (scale           



                                                  7-10)<br>U           



                                                  se             



                                                  approved           



                                                  by             



                                                  (Faculty):           



                                                  [facility]            



                                                  PROVIDER           

 

     HYDROmorpho      2023-0 2023- Yes            1mg       1 mg, Slow          

 [facility]



     ne                                  IV Push,           ity of



     (DILAUDID)      11:09: 11:08                          Q4HPRN,           [facility]

as



     injection 1      37   :37                           Starting           Medi

[facility]



     mg                                           on Thu           [facility]



                                                  23 at           



                                                  0509,           



                                                  Until Sat           



                                                  23 at           



                                                  0508,           



                                                  Routine,           



                                                  Pain           



                                                  (scale           



                                                  7-10)<br>U           



                                                  se             



                                                  approved           



                                                  by             



                                                  (Faculty):           



                                                  [facility]            



                                                  PROVIDER           

 

     HYDROmorpho      2023-0      Yes            4mg       Take 4 mg           U

nivers



     ne 4 mg      2-23                               by mouth           ity of



     tablet      10:57:                               in the           [place]



               10                                 morning           Medical



                                                  and 4 mg           [facility]



                                                  at noon           



                                                  and 4 mg           



                                                  in the           



                                                  evening.           

 

     insulin NPH      2023-0      Yes            45U       inject 45           U

nivers



     human      2-23                               Units           ity of



     isophane      10:57:                               under the           [place]

s



     (NOVOLIN N      10                                 skin 2           Medical



     SC)                                          (two)           [facility]



                                                  times           



                                                  daily.           

 

     HYDROmorpho      2023-0      Yes            4mg       Take 4 mg           U

nivers



     ne 4 mg      2-23                               by mouth           ity of



     tablet      10:57:                               in the           [place]



               10                                 morning           Medical



                                                  and 4 mg           [facility]



                                                  at noon           



                                                  and 4 mg           



                                                  in the           



                                                  evening.           

 

     insulin NPH      2023-0      Yes            45U       inject 45           U

nivers



     human      2-23                               Units           ity of



     isophane      10:57:                               under the           [place]

s



     (NOVOLIN N      10                                 skin 2           Medical



     SC)                                          (two)           [facility]



                                                  times           



                                                  daily.           

 

     sodium      -0 - No             16[oz_a      16 oz,           [facility]

rs



     hypochlorit                      v]        Topical,           ity

 of



     e 0.5%      04:00: 18:17                          Q8H, [facility]           [place]

s



     (DAKINS)      00   :09                           dose on           Medical



     solution 16                                         Wed            [facility]



     oz                                           23 at           



                                                  2200,           



                                                  Until           



                                                  Discontinu           



                                                  ed,            



                                                  Routine           

 

     amitriptyli      -0      Yes            25mg      25 mg,           Univ

ers



     ne (ELAVIL)      2-23                               Oral, QHS,           it

y of



     tablet 25      03:45:                               First dose           [place]

[place]



     mg        00                                 on Wed           Medical



                                                  23 at           [facility]



                                                  2145,           



                                                  Until           



                                                  Discontinu           



                                                  ed,            



                                                  Routine           

 

     amitriptyli      -0      Yes            25mg      25 mg,           Univ

ers



     ne (ELAVIL)      2-23                               Oral, QHS,           it

y of



     tablet 25      03:45:                               First dose           [place]

[place]



     mg        00                                 on Wed           Medical



                                                  23 at           [facility]



                                                  2145,           



                                                  Until           



                                                  Discontinu           



                                                  ed,            



                                                  Routine           

 

     HYDROmorpho      -0 - No             1mg       1 mg, Slow          

 [facility]



     ne                                  IV Push,           ity of



     (DILAUDID)      03:40: 09:56                          Q4HPRN,           Eric

as



     injection 1      47   :20                           Starting           Medi

sarah



     mg                                           on Wed           [facility]anch



                                                  23 at           



                                                  2140,           



                                                  Until Thu           



                                                  23 at           



                                                  0356,           



                                                  Routine,           



                                                  Pain           



                                                  (scale           



                                                  7-10)<br>U           



                                                  se             



                                                  approved           



                                                  by             



                                                  (Faculty):           



                                                  [facility]            



                                                  PROVIDER           

 

     FENTanyl PF      2023- No             25ug      25 mcg,           Un

yoselyn



     (SUBLIMAZE                                Slow IV           ity o

f



     (PF))      20:55: 21:26                          Push,           [place]



     injection      32   :00                           Q5MIN PRN,           Medi

sarah



     25 mcg                                         4 doses,           [facility]



                                                  Starting           



                                                  on 23 at           



                                                  1455,           



                                                  Until 23 at           



                                                  1526,           



                                                  Routine,           



                                                  Pain           



                                                  (scale           



                                                  7-10),           



                                                  PACU           

 

     bupivacaine      2023- No                       PRN,           Unive

rs



     (preserv                                Starting           ity of



     free)      20:36: 21:03                          on Wed           [place]



     (SENSORCAIN      00   :01                           23 at           Me

dical



     E MPF) 0.25                                         1436,           Branch



     % (2.5                                         Until Wed           



     mg/mL)                                         23 at           



     injection                                         1503,           



                                                  Routine,           



                                                  Intra-op           

 

     insulin            Yes            4U        4 Units,           Univer

s



     lispro      -                               Subcutaneo           ity of



     (human)      17:30:                               us, TIDAC           [place]

[place]



     (HumaLOG      00                                 First dose           Medic

al



     U-100)                                         on Wed           [facility]



     injection 4                                         23 at           



     Units                                         1130,           



                                                  Until           



                                                  Discontinu           



                                                  ed,            



                                                  Routine           

 

     insulin      -0      Yes            4U        4 Units,           Univer

s



     lispro      -22                               Subcutaneo           ity of



     (human)      17:30:                               us, TIDAC,           [place]

[place]



     (HumaLOG      00                                 First dose           Medic

al



     U-100)                                         on Wed           [facility]



     injection 23 at           



     Units                                         1130,           



                                                  Until           



                                                  Discontinu           



                                                  ed,            



                                                  Routine           

 

     proMETHazin            Yes            12.5mg      12.5 mg,           

Univers



     e         -                               IV             ity of



     (PHENERGAN)      15:11:                               Piggyback,           

[place]



     12.5 mg in      52                                 at 200           Medical



     NaCl 0.9%                                         mL/hr           Br[facility]



     (NS) 50 mL                                         Administer           



     IV                                           over 15           



     piggyback                                         Minutes,           



                                                  Q4HPRN,           



                                                  Starting           



                                                  on 23 at           



                                                  0911,           



                                                  Until           



                                                  Discontinu           



                                                  ed,            



                                                  Routine,           



                                                  N/V            



                                                  unresponsi           



                                                  ve to           



                                                  Ondansetro           



                                                  n              

 

     proMETHazin            Yes            12.5mg      12.5 mg,           

Univers



     e         2-                               IV             ity of



     (PHENERGAN)      15:11:                               Piggyback,           

[place]



     12.5 mg in      52                                 at 200           Medical



     NaCl 0.9%                                         mL/hr           [facility]



     (NS) 50 mL                                         Administer           



     IV                                           over 15           



     piggyback                                         Minutes,           



                                                  Q4HPRN,           



                                                  Starting           



                                                  on 23 at           



                                                  0911,           



                                                  Until           



                                                  Discontinu           



                                                  ed,            



                                                  Routine,           



                                                  N/V            



                                                  unresponsi           



                                                  ve to           



                                                  Ondansetro           



                                                  n              

 

     cefTRIAXone      2023- Yes            1000mg      1,000 mg,         

  Univers



     (ROCEPHIN)                                IV             ity of



     1,000 mg in      04:00: 03:59                          [place]



     NaCl 0.9%      00   :00                           Q24H, 7           Medical



     (NS) 50 mL                                         doses,           [facility]



     MINI-BAG                                         First dose           



                                                  on 23 at           



                                                  2200, Last           



                                                  dose on           



                                                  23 at           



                                                  2200,           



                                                  Administer           



                                                  over 30           



                                                  Minutes,           



                                                  50             



                                                  mL<br>Reas           



                                                  on for           



                                                  Anti-Infec           



                                                  tive:           



                                                  Documented           



                                                  Infection<           



                                                  br>Documen           



                                                  huma            



                                                  Infection           



                                                  Site:           



                                                  Urine<br>D           



                                                  uration of           



                                                  Therapy: 7           



                                                  days           

 

     cefTRIAXone      2023- Yes            1000mg      1,000 mg,         

  [facility]



     (ROCEPHIN)                                IV             ity of



     1,000 mg in      04:00: 03:59                          [place], [place]



     NaCl 0.9%      00   :00                           Q24H, 7           Medical



     (NS) 50 mL                                         doses,           [facility]



     MINI-BAG                                         First dose           



                                                  on 23 at           



                                                  2200, Last           



                                                  dose on           



                                                  23 at           



                                                  2200,           



                                                  Administer           



                                                  over 30           



                                                  Minutes,           



                                                  50             



                                                  mL<br>Reas           



                                                  on for           



                                                  Anti-Infec           



                                                  tive:           



                                                  Documented           



                                                  Infection<           



                                                  br>Documen           



                                                  huma            



                                                  Infection           



                                                  Site:           



                                                  Urine<br>D           



                                                  uration of           



                                                  Therapy: 7           



                                                  days           

 

     insulin NPH            Yes            30U       30 Units,           U

nivers



     (HUMULIN N)      -                               Subcutaneo           it

y of



     injection      03:00:                                           [place]



     30 Units      00                                 QAM+HS,           Medical



                                                  First dose           [facility]



                                                  (after           



                                                  last           



                                                  modificati           



                                                  on) on 23 at           



                                                  2100,           



                                                  Until           



                                                  Discontinu           



                                                  ed,            



                                                  Routine           

 

     insulin NPH            Yes            30U       30 Units,           U

nivers



     (HUMULIN N)      -                               Subcutaneo           it

y of



     injection      03:00:                                           [place]



     30 Units      00                                 QAM+HS,           Medical



                                                  First dose           [facility]



                                                  (after           



                                                  last           



                                                  modificati           



                                                  on) on 23 at           



                                                  2100,           



                                                  Until           



                                                  Discontinu           



                                                  ed,            



                                                  Routine           

 

     enoxaparin      -0      Yes            40mg      40 mg,           Unive

rs



     (LOVENOX)      -                               Subcutaneo           ity 

of



     injection      23:00:                               us, DAILY,           [place]

[place]



     40 mg      00                                 First dose           Medical



                                                  on [facility]



                                                  23 at           



                                                  1700,           



                                                  Until           



                                                  Discontinu           



                                                  ed,            



                                                  Routine           

 

     enoxaparin      -0      Yes            40mg      40 mg,           Unive

rs



     (LOVENOX)                                     Subcutaneo           ity 

of



     injection      23:00:                               us, DAILY,           Te

[place]



     40 mg      00                                 First dose           Medical



                                                  on [facility]



                                                  23 at           



                                                  1700,           



                                                  Until           



                                                  Discontinu           



                                                  ed,            



                                                  Routine           

 

     proCHLORper      -0      Yes            10mg      10 mg, IV           U

nivers



     azine                                     Piggyback,           ity of



     (COMPAZINE)      20:07:                               at 100           [place]

[place]



     10 mg in      44                                 mL/hr           Medical



     NaCl 0.9%                                         Administer           Bran

ch



     (NS)                                         over 30           



     piggyback                                         Minutes,           



                                                  Q6HPRN,           



                                                  Starting           



                                                  on 23 at           



                                                  1407,           



                                                  Until           



                                                  Discontinu           



                                                  ed,            



                                                  Routine,           



                                                  Nausea and           



                                                  Vomiting           



                                                  (N/V)           

 

     proCHLORper      -0      Yes            10mg      10 mg, IV           U

nivers



     azine                                     Piggyback,           ity of



     (COMPAZINE)      20:07:                               at 100           [place]

[place]



     10 mg in      44                                 mL/hr           Medical



     NaCl 0.9%                                         Administer           Bran

ch



     (NS)                                         over 30           



     piggyback                                         Minutes,           



                                                  Q6HPRN,           



                                                  Starting           



                                                  on 23 at           



                                                  1407,           



                                                  Until           



                                                  Discontinu           



                                                  ed,            



                                                  Routine,           



                                                  Nausea and           



                                                  Vomiting           



                                                  (N/V)           

 

     insulin      2023- No             10U       10 Units,           Univ

ers



     regular                                Subcutaneo           ity o

f



     human      19:00: 19:28                          us, ONCE,           [place]



     (HUMULIN R)      00   :00                           1 dose, On           Me

dical



     injection                                         [facility]



     10 Units                                         23 at           



                                                  1300,           



                                                  Routine<br           



                                                  >Indicatio           



                                                  n for           



                                                  insulin:           



                                                  Hyperglyce           



                                                  jarvis            

 

     insulin      2023- No             10U       10 Units,           Univ

ers



     regular                                IV Push,           ity of



     human      16:00: 17:00                          ONCE, 1           [place]



     (HUMULIN R)      00   :00                           dose, On           Medi

sarah



     injection                                         Tue            [facility]anch



     10 Units                                         23 at           



                                                  1000,           



                                                  Routine<br           



                                                  >Indicatio           



                                                  n for           



                                                  insulin:           



                                                  Hyperglyce           



                                                  jarvis            

 

     pantoprazol            Yes            40mg      40 mg,           Univ

ers



     e                                        Slow IV           ity of



     (PROTONIX)      15:00:                               [place],           [place]



     injection      00                                 DAILY,           Medical



     40 mg                                         First dose           [facility]



                                                  on 23 at           



                                                  0900,           



                                                  Until           



                                                  Discontinu           



                                                  ed             

 

     sennosides-            Yes            1{tbl}      1 tablet,          

 Univers



     docusate                                     Oral,           ity of



     sodium      15:00:                               DAILY,           [place]



     (SENOKOT-S)      00                                 First dose           Me

dical



     8.6-50 mg                                         on Tue           Branch



     per tablet                                         23 at           



     1 tablet                                         0900,           



                                                  Until           



                                                  Discontinu           



                                                  ed,            



                                                  Routine           

 

     pantoprazol            Yes            40mg      40 mg,           Univ

ers



     e                                        Slow IV           ity of



     (PROTONIX)      15:00:                               Push,           [place]



     injection      00                                 DAILY,           Medical



     40 mg                                         First dose           [facility]



                                                  on 23 at           



                                                  0900,           



                                                  Until           



                                                  Discontinu           



                                                  ed             

 

     sennosides-            Yes            1{tbl}      1 tablet,          

 [facility]



     docusate                                     Oral,           ity of



     sodium      15:00:                               DAILY,           [place]



     (SENOKOT-S)                                       First dose           Me

dical



     8.6-50 mg                                         on Tue           [facility]



     per tablet                                         23 at           



     1 tablet                                         0900,           



                                                  Until           



                                                  Discontinu           



                                                  ed,            



                                                  Routine           

 

     insulin NPH      2023- No             20U       20 Units,           

[facility]



     (HUMULIN N)                                Subcutaneo           i

ty of



     injection      15:00: 18:00                          [place], [place]



     20 Units      00   :37                           QAM+HS,           Medical



                                                  First dose           Br[facility]



                                                  on 23 at           



                                                  0900,           



                                                  Until           



                                                  Discontinu           



                                                  ed,            



                                                  Routine           

 

     Sliding            Yes                      Subcutaneo           Univ

ers



     Scale      2-21                               us, TID           ity of



     Insulin -      14:00:                               MEALS+HS,           [facility]

as



     Lispro      00                                 First dose           Medical



     (HumaLOG) +                                         on Tue           [facility]



     Fsbg                                         23 at           



     Testing                                         0800,           



                                                  Until           



                                                  Discontinu           



                                                  ed,            



                                                  Routine           

 

     Sliding            Yes                      Subcutaneo           Univ

ers



     Scale      2-21                               us, TID           ity of



     Insulin -      14:00:                               MEALS+HS,           Eric

as



     Lispro      00                                 First dose           Medical



     (HumaLOG) +                                         on Tue           [facility]



     Fsbg                                         23 at           



     Testing                                         0800,           



                                                  Until           



                                                  Discontinu           



                                                  ed,            



                                                  Routine           

 

     HYDROmorpho      0 - No             .5mg      0.5 mg,           Un

[place]



     ne                                  Slow IV           ity of



     (DILAUDID)      09:57: 03:41                          Push,           [place]



     injection      20   :06                           Q4HPRN,           Medical



     0.5 mg                                         Starting           Br[facility]



                                                  on 23 at           



                                                  0357,           



                                                  Until 23 at           



                                                  2141,           



                                                  Routine,           



                                                  Pain           



                                                  (scale           



                                                  7-10)<br>U           



                                                  se             



                                                  approved           



                                                  by             



                                                  (Faculty):           



                                                  [facility]            



                                                  PROVIDER           

 

     HYDROmorpho      -0      Yes            4mg       4 mg,           Unive

rs



     ne                                       Oral,           ity of



     (DILAUDID)      09:57:                               Q4HPRN,           [place]

[place]



     tablet 4 mg      04                                 Starting           Medi

sar[facility]



                                                  on Tue           [facility]



                                                  23 at           



                                                  0357,           



                                                  Until           



                                                  Discontinu           



                                                  ed,            



                                                  Routine,           



                                                  Pain           



                                                  (scale           



                                                  4-6)           

 

     HYDROmorpho      -0      Yes            4mg       4 mg,           Unive

rs



     ne        -                               Oral,           ity of



     (DILAUDID)      09:57:                               Q4HPRN,           [place]

[place]



     tablet 4 mg      04                                 Starting           Medi

sarah



                                                  on Tue           [facility]



                                                  23 at           



                                                  0357,           



                                                  Until           



                                                  Discontinu           



                                                  ed,            



                                                  Routine,           



                                                  Pain           



                                                  (scale           



                                                  4-6)           

 

     FENTanyl PF      -0 - No             50ug      50 mcg,           Un

[place]



     (SUBLIMAZE                                Slow IV           ity o

f



     (PF))      09:17: 09:58                          Push,           [place]



     injection      04   :01                           Q4HPRN,           Medical



     50 mcg                                         Starting           [facility]



                                                  on 23 at           



                                                  0317,           



                                                  Until 23 at           



                                                  0358,           



                                                  Routine,           



                                                  Pain           



                                                  (scale           



                                                  7-10)           

 

     NaCl 0.9%      -0      Yes            1000mL      at 125           Univ

ers



     (NS) IV      2-21                               mL/hr, IV           ity of



     infusion      09:00:                               Infusion,           [place]

[place]



     1,000 mL      00                                 CONTINUOUS           Medic

al



                                                  , Starting           [facility]



                                                  on 23 at           



                                                  0300,           



                                                  Until           



                                                  Discontinu           



                                                  ed,            



                                                  Routine           

 

     NaCl 0.9%      -0      Yes            1000mL      at 125           Univ

ers



     (NS) IV      2-21                               mL/hr, IV           ity of



     infusion      09:00:                               Infusion,           [place]

[place]



     1,000 mL      00                                 CONTINUOUS           Medic

al



                                                  , Starting           [facility]



                                                  on            



                                                  23 at           



                                                  0300,           



                                                  Until           



                                                  Discontinu           



                                                  ed,            



                                                  Routine           

 

     glucagon      2023-0      Yes            1mg       1 mg,           [facility]



     (GLUCAGEN      2-21                               Intramuscu           ity 

of



     DIAGNOSTIC      08:53:                               lar, PRN,           [place]

[place]



     KIT)      52                                 Starting           Medical



     injection 1                                         on Tue           [facility]



     mg                                           23 at           



                                                  0253,           



                                                  Until           



                                                  Discontinu           



                                                  ed, ASAP,           



                                                  Blood           



                                                  Glucose           



                                                  &amp;lt;           



                                                  or = 70           



                                                  mg/dL and           



                                                  patient is           



                                                  NPO,           



                                                  unable to           



                                                  swallow or           



                                                  has mental           



                                                  changes.           

 

     dextrose 50      2023-0      Yes            25mL      25 mL,           Univ

ers



     % in water      2-21                               Slow IV           ity of



     (D50W)      08:53:                               Push, PRN,           [place]



     injection      52                                 Starting           Medica

l



     25 mL                                         on Tue           [facility]



                                                  23 at           



                                                  0253,           



                                                  Until           



                                                  Discontinu           



                                                  ed, ASAP,           



                                                  Blood           



                                                  Glucose           



                                                  &amp;lt;           



                                                  or = 70           



                                                  mg/dL and           



                                                  patient is           



                                                  NPO,           



                                                  unable to           



                                                  swallow or           



                                                  has mental           



                                                  status           



                                                  changes.           

 

     glucagon      2023-0      Yes            1mg       1 mg,           [facility]



     (GLUCAGEN      2-21                               Intramuscu           ity 

of



     DIAGNOSTIC      08:53:                               lar, PRN,           [facility]

[place]



     KIT)      52                                 Starting           Medical



     injection 1                                         on [facility]                                           23 at           



                                                  0253,           



                                                  Until           



                                                  Discontinu           



                                                  ed, ASAP,           



                                                  Blood           



                                                  Glucose           



                                                  &amp;lt;           



                                                  or = 70           



                                                  mg/dL and           



                                                  patient is           



                                                  NPO,           



                                                  unable to           



                                                  swallow or           



                                                  has mental           



                                                  changes.           

 

     dextrose 50      2023-0      Yes            25mL      25 mL,           Univ

ers



     % in water      2-21                               Slow IV           ity of



     (D50W)      08:53:                               Push, PRN,           [place]



     injection      52                                 Starting           Medica

l



     25 mL                                         on [facility]



                                                  23 at           



                                                  0253,           



                                                  Until           



                                                  Discontinu           



                                                  ed, ASAP,           



                                                  Blood           



                                                  Glucose           



                                                  &amp;lt;           



                                                  or = 70           



                                                  mg/dL and           



                                                  patient is           



                                                  NPO,           



                                                  unable to           



                                                  swallow or           



                                                  has mental           



                                                  status           



                                                  changes.           

 

     ondansetron      2023-0      Yes            4mg       4 mg, Slow           

[facility]



     (ZOFRAN      2-21                               IV Push,           ity of



     (PF))      08:53:                               Q6HPRN,           [place]



     injection 4      45                                 Starting           Medi

sarah



     mg                                           on Tue           [facility]



                                                  23 at           



                                                  0253,           



                                                  Until           



                                                  Discontinu           



                                                  ed,            



                                                  Routine,           



                                                  Nausea and           



                                                  Vomiting           



                                                  (N/V)           

 

     ondansetron      2023-0      Yes            4mg       4 mg, Slow           

[facility]



     (ZOFRAN                                     IV Push,           ity of



     (PF))      08:53:                               Q6HPRN,           [place]



     injection 4      45                                 Starting           Medi

sarah



     mg                                           on Tue           [facility]



                                                  23 at           



                                                  0253,           



                                                  Until           



                                                  Discontinu           



                                                  ed,            



                                                  Routine,           



                                                  Nausea and           



                                                  Vomiting           



                                                  (N/V)           

 

     acetaminoph      3-0      Yes            650mg      650 mg,           Un

[place]



     en                                       Oral,           ity of



     (TYLENOL)      08:53:                               Q6HPRN,           [place]



     tablet 650      15                                 Starting           Medic

al



     mg                                           on Tue           [facility]



                                                  23 at           



                                                  0253,           



                                                  Until           



                                                  Discontinu           



                                                  ed,            



                                                  Routine,           



                                                  Pain           



                                                  (scale           



                                                  1-3)           

 

     acetaminoph      3-0      Yes            650mg      650 mg,           Un

[place]



     en                                       Oral,           ity of



     (TYLENOL)      08:53:                               Q6HPRN,           [place]



     tablet 650      15                                 Starting           Medic

al



     mg                                           on Tue           [facility]anch



                                                  23 at           



                                                  0253,           



                                                  Until           



                                                  Discontinu           



                                                  ed,            



                                                  Routine,           



                                                  Pain           



                                                  (scale           



                                                  1-3)           

 

     insulin      -0 2023- No             10U       10 Units,           Univ

ers



     regular                                IV Push,           ity of



     human      06:30: 05:53                          ONCE, 1           [place]



     (HUMULIN R)      00   :00                           dose, On           Medi

sarah



     injection                                         Tue            [facility]anch



     10 Units                                         23 at           



                                                  0030,           



                                                  Routine<br           



                                                  >Indicatio           



                                                  n for           



                                                  insulin:           



                                                  Hyperglyce           



                                                  jarvis            

 

     NaCl 0.9%      2023- No             1000mL      at 999           Uni

vers



     (NS) bolus                                mL/hr,           ity of



     infusion      06:00: 06:53                          1,000 mL,           Eric

as



     1,000 mL      00   :00                           IV             Medical



                                                  Infusion,           [facility]anch



                                                  ONCE, 1           



                                                  dose, On           



                                                  23 at           



                                                  0000, ASAP           

 

     insulin      -0 - No             10U       10 Units,           Univ

ers



     regular                                Slow IV           ity of



     human      05:00: 04:15                          Push,           [place]



     (HUMULIN R)      00   :00                           ONCE, 1           Medic

al



     injection                                         dose, On           Branch



     10 Units                                         Mon            



                                                  23 at           



                                                  2300,           



                                                  Routine<br           



                                                  >Indicatio           



                                                  n for           



                                                  insulin:           



                                                  Hyperglyce           



                                                  jarvis            

 

     NaCl 0.9%      2023- No             1000mL      at 999           Uni

vers



     (NS) bolus                                mL/hr,           ity of



     infusion      04:45: 05:33                          1,000 mL,           Eric

as



     1,000 mL      00   :00                           IV             Medical



                                                  Infusion,           [facility]



                                                  ONCE, 1           



                                                  dose, On           



                                                  Mon            



                                                  23 at           



                                                  2245, ASAP           

 

     ibuprofen      -0 - No             600mg      600 mg,           Un[facility]

vers



     (IBU)                                Oral,           ity of



     tablet 600      04:09: 04:21                          ONCE, 1           [facility]

as



     mg        00   :00                           dose, On           Medical



                                                  Mon            [place]



                                                  23 at           



                                                  2215, ASAP           

 

     cefTRIAXone      -0 - No             1000mg      1,000 mg,         

  [facility]



     (ROCEPHIN)                                IV             ity of



     1,000 mg in      03:45: 04:34                          [place]gyback,          

 [place]



     NaCl 0.9%      00   :00                           ONCE, 1           Medical



     (NS) 100 mL                                         dose, On           [facility]

ch



     MINI-BAG                                         23 at           



                                                  2145,           



                                                  Administer           



                                                  over 30           



                                                  Minutes,           



                                                  100            



                                                  mL<br>Reas           



                                                  on for           



                                                  Anti-Infec           



                                                  tive:           



                                                  Documented           



                                                  Infection<           



                                                  br>Documen           



                                                  huma            



                                                  Infection           



                                                  Site:           



                                                  Urine<br&g           



                                                  t;Duration           



                                                  of             



                                                  Therapy:           



                                                  Other (see           



                                                  Comments)           

 

     HYDROmorpho            Yes            4mg       Take 4 mg           U

nivers



     ne 4 mg      2-21                               by mouth           ity of



     tablet      01:46:                               in the           [place]



               16                                 morning           Medical



                                                  and 4 mg           [facility]



                                                  at noon           



                                                  and 4 mg           



                                                  in the           



                                                  evening.           

 

     insulin NPH            Yes            45U       inject 45           U

nivers



     human      2-21                               Units           ity of



     isophane      01:46:                               under the           [place]

s



     (NOVOLIN N      16                                 skin 2           Medical



     SC)                                          (two)           [facility]



                                                  times           



                                                  daily.           

 

     flash      0      Yes       [id number] 1{kit}      1 Kit           [facility]

rs



     glucose      2-14                               daily.           ity of



     scanning      00:00:                                              [place]



     reader      00                                                Medical



     (FREESTYLE                                                        Branch



     DENISE 2                                                        



     READER)                                                        



     Misc                                                        

 

     flash      -0      Yes       [id number] 1{kit}      1 Kit           Unive

rs



     glucose      2-14                               every 14           ity of



     sensor      00:00:                               (fourteen)           [place]



     (FREESTYLE      00                                 days.           Medical



     DENISE 2                                                        [facility]



     SENSOR) Kit                                                        

 

     flash      -0      Yes       [id number] 1{kit}      1 Kit           Unive

rs



     glucose      2-14                               daily.           ity of



     scanning      00:00:                                              [place]



     reader      00                                                Medical



     (FREESTYLE                                                        Branch



     DENISE 2                                                        



     READER)                                                        



     Misc                                                        

 

     flash      -0      Yes       [id number] 1{kit}      1 Kit           Unive

rs



     glucose      2-14                               every 14           ity of



     sensor      00:00:                               (fourteen)           [place]



     (FREESTYLE      00                                 days.           Medical



     DENISE 2                                                        [facility]



     SENSOR) Kit                                                        

 

     flash      2023-0      Yes       [id number] 1{kit}      1 Kit           Unive

rs



     glucose      2-14                               daily.           ity of



     scanning      00:00:                                              [place]



     reader      00                                                Medical



     (FREESTYLE                                                        Branch



     DENISE 2                                                        



     READER)                                                        



     Misc                                                        

 

     flash      2023-0      Yes       [id number] 1{kit}      1 Kit           Unive

rs



     glucose      2-14                               every 14           ity of



     sensor      00:00:                               (fourteen)           [place]



     (FREESTYLE      00                                 days.           Medical



     DENISE 2                                                        [facility]



     SENSOR) Kit                                                        

 

     flash      2023-0      Yes       [id number] 1{kit}      1 Kit           Unive

rs



     glucose      2-14                               daily.           ity of



     scanning      00:00:                                              [place]



     reader      00                                                Medical



     (FREESTYLE                                                        Branch



     DENISE 2                                                        



     READER)                                                        



     Misc                                                        

 

     flash      2023-0      Yes       [id number] 1{kit}      1 Kit           Unive

rs



     glucose      2-14                               every 14           ity of



     sensor      00:00:                               (fourteen)           [place]



     (FREESTYLE      00                                 days.           Medical



     DENISE 2                                                        [facility]



     SENSOR) Kit                                                        

 

     flash      2023-0      Yes       [id number] 1{kit}      1 Kit           Unive

rs



     glucose      2-14                               daily.           ity of



     scanning      00:00:                                              [place]



     reader      00                                                Medical



     (FREESTYLE                                                        Branch



     DENISE 2                                                        



     READER)                                                        



     Misc                                                        

 

     flash      2023-0      Yes       [id number] 1{kit}      1 Kit           Unive

rs



     glucose      2-14                               every 14           ity of



     sensor      00:00:                               (fourteen)           [place]



     (FREESTYLE      00                                 days.           Medical



     DENISE 2                                                        Br[facility]



     SENSOR) Kit                                                        

 

     flash      2023-0      Yes       [id number] 1{kit}      1 Kit           Unive

rs



     glucose      2-14                               daily.           ity of



     scanning      00:00:                                              [place]



     reader      00                                                Medical



     (FREESTYLE                                                        Branch



     DENISE 2                                                        



     READER)                                                        



     Misc                                                        

 

     flash      2023-0      Yes       [id number] 1{kit}      1 Kit           Unive

rs



     glucose      2-14                               every 14           ity of



     sensor      00:00:                               (fourteen)           [place]



     (FREESTYLE      00                                 days.           Medical



     DENISE 2                                                        Br[facility]



     SENSOR) Kit                                                        

 

     flash      2023-0      Yes       [id number] 1{kit}      1 Kit           Unive

rs



     glucose      2-14                               daily.           ity of



     scanning      00:00:                                              [place]



     reader      00                                                Medical



     (FREESTYLE                                                        Branch



     DENISE 2                                                        



     READER)                                                        



     Misc                                                        

 

     flash      2023-0      Yes       [id number] 1{kit}      1 Kit           Unive

rs



     glucose      2-14                               every 14           ity of



     sensor      00:00:                               (fourteen)           [place]



     (FREESTYLE      00                                 days.           Medical



     DENISE 2                                                        [facility]



     SENSOR) Kit                                                        

 

     flash      2023-0      Yes       [id number] 1{kit}      1 Kit           Unive

rs



     glucose      2-14                               daily.           ity of



     scanning      00:00:                                              [place]



     reader      00                                                Medical



     (FREESTYLE                                                        Branch



     DENISE 2                                                        



     READER)                                                        



     Misc                                                        

 

     flash      2023-0      Yes       [id number] 1{kit}      1 Kit           Unive

rs



     glucose      2-14                               every 14           ity of



     sensor      00:00:                               (fourteen)           [place]



     (FREESTYLE      00                                 days.           Medical



     DENISE 2                                                        Br[facility]



     SENSOR) Kit                                                        

 

     flash      2023-0      Yes       [id number] 1{kit}      1 Kit           Unive

rs



     glucose      2-14                               daily.           ity of



     scanning      00:00:                                              [place]



     reader      00                                                Medical



     (FREESTYLE                                                        Branch



     DENISE 2                                                        



     READER)                                                        



     Misc                                                        

 

     flash      2023-0      Yes       [id number] 1{kit}      1 Kit           Unive

rs



     glucose      2-14                               every 14           ity of



     sensor      00:00:                               (fourteen)           [place]



     (FREESTYLE      00                                 days.           Medical



     DENISE 2                                                        Br[facility]



     SENSOR) Kit                                                        

 

     flash      2023-0      Yes       [id number] 1{kit}      1 Kit           Unive

rs



     glucose      2-14                               daily.           ity of



     scanning      00:00:                                              [place]



     reader      00                                                Medical



     (FREESTYLE                                                        Branch



     DENISE 2                                                        



     READER)                                                        



     Misc                                                        

 

     flash      2023-0      Yes       [id number] 1{kit}      1 Kit           Unive

rs



     glucose      2-14                               every 14           ity of



     sensor      00:00:                               (fourteen)           [place]



     (FREESTYLE      00                                 days.           Medical



     DENISE 2                                                        Br[facility]



     SENSOR) Kit                                                        

 

     flash      2023-0      Yes       [id number] 1{kit}      1 Kit           Unive

rs



     glucose      2-14                               daily.           ity of



     scanning      00:00:                                              [place]



     reader      00                                                Medical



     (FREESTYLE                                                        Branch



     DENISE 2                                                        



     READER)                                                        



     Misc                                                        

 

     flash      2023-0      Yes       [id number] 1{kit}      1 Kit           Unive

rs



     glucose      2-14                               every 14           ity of



     sensor      00:00:                               (fourteen)           [place]



     (FREESTYLE      00                                 days.           Medical



     DENISE 2                                                        Br[facility]



     SENSOR) Kit                                                        

 

     flash      2023-0      Yes       [id number] 1{kit}      1 Kit           Unive

rs



     glucose      2-14                               daily.           ity of



     scanning      00:00:                                              [place]



     reader      00                                                Medical



     (FREESTYLE                                                        Branch



     DENISE 2                                                        



     READER)                                                        



     Misc                                                        

 

     flash      2023-0      Yes       [id number] 1{kit}      1 Kit           Unive

rs



     glucose      2-14                               every 14           ity of



     sensor      00:00:                               (fourteen)           [place]



     (FREESTYLE      00                                 days.           Medical



     DENISE 2                                                        Br[facility]



     SENSOR) Kit                                                        

 

     flash      2023-0      Yes       [id number] 1{kit}      1 Kit           Unive

rs



     glucose      2-14                               daily.           ity of



     scanning      00:00:                                              [place]



     reader      00                                                Medical



     (FREESTYLE                                                        Branch



     DENISE 2                                                        



     READER)                                                        



     Misc                                                        

 

     flash      2023-0      Yes       [id number] 1{kit}      1 Kit           Unive

rs



     glucose      2-14                               every 14           ity of



     sensor      00:00:                               (fourteen)           [place]



     (FREESTYLE      00                                 days.           Medical



     DENISE 2                                                        Br[facility]



     SENSOR) Kit                                                        

 

     flash      2023-0      Yes       [id number] 1{kit}      1 Kit           Unive

rs



     glucose      2-14                               daily.           ity of



     scanning      00:00:                                              [place]



     reader      00                                                Medical



     (FREESTYLE                                                        Branch



     DENISE 2                                                        



     READER)                                                        



     Misc                                                        

 

     flash      2023-0      Yes       [id number] 1{kit}      1 Kit           Unive

rs



     glucose      2-14                               every 14           ity of



     sensor      00:00:                               (fourteen)           [place]



     (FREESTYLE      00                                 days.           Medical



     DENISE 2                                                        Branch



     SENSOR) Kit                                                        

 

     flash      2023-0      Yes       [id number] 1{kit}      1 Kit           Unive

rs



     glucose      2-14                               daily.           ity of



     scanning      00:00:                                              [place]



     reader      00                                                Medical



     (FREESTYLE                                                        Branch



     DENISE 2                                                        



     READER)                                                        



     Misc                                                        

 

     flash      2023-0      Yes       [id number] 1{kit}      1 Kit           Unive

rs



     glucose      2-14                               every 14           ity of



     sensor      00:00:                               (fourteen)           [place]



     (FREESTYLE      00                                 days.           Medical



     DENISE 2                                                        Branch



     SENSOR) Kit                                                        

 

     flash      2023-0      Yes       [id number] 1{kit}      1 Kit           Unive

rs



     glucose      2-14                               daily.           ity of



     scanning      00:00:                                              [place]



     reader      00                                                Medical



     (FREESTYLE                                                        Branch



     DENISE 2                                                        



     READER)                                                        



     Misc                                                        

 

     flash      2023-0      Yes       [id number] 1{kit}      1 Kit           Unive

rs



     glucose      2-14                               every 14           ity of



     sensor      00:00:                               (fourteen)           [place]



     (FREESTYLE      00                                 days.           Medical



     DENISE 2                                                        Branch



     SENSOR) Kit                                                        

 

     flash      2023-0      Yes       [id number] 1{kit}      1 Kit           Unive

rs



     glucose      2-14                               daily.           ity of



     scanning      00:00:                                              [place]



     reader      00                                                Medical



     (FREESTYLE                                                        Branch



     DENISE 2                                                        



     READER)                                                        



     Misc                                                        

 

     flash      2023-0      Yes       [id number] 1{kit}      1 Kit           Unive

rs



     glucose      2-14                               every 14           ity of



     sensor      00:00:                               (fourteen)           [place]



     (FREESTYLE      00                                 days.           Medical



     DENISE 2                                                        Branch



     SENSOR) Kit                                                        

 

     flash      2023-0      Yes       [id number] 1{kit}      1 Kit           Unive

rs



     glucose      2-14                               daily.           ity of



     scanning      00:00:                                              [place]



     reader      00                                                Medical



     (FREESTYLE                                                        Branch



     DENISE 2                                                        



     READER)                                                        



     Misc                                                        

 

     flash      2023-0      Yes       [id number] 1{kit}      1 Kit           Unive

rs



     glucose      2-14                               every 14           ity of



     sensor      00:00:                               (fourteen)           [place]



     (FREESTYLE      00                                 days.           Medical



     DENISE 2                                                        Branch



     SENSOR) Kit                                                        

 

     flash      2023-0      Yes       [id number] 1{kit}      1 Kit           Unive

rs



     glucose      2-14                               daily.           ity of



     scanning      00:00:                                              [place]



     reader      00                                                Medical



     (FREESTYLE                                                        Branch



     DENISE 2                                                        



     READER)                                                        



     Misc                                                        

 

     flash      2023-0      Yes       [id number] 1{kit}      1 Kit           Unive

rs



     glucose      2-14                               every 14           ity of



     sensor      00:00:                               (fourteen)           [place]



     (FREESTYLE      00                                 days.           Medical



     DENISE 2                                                        Branch



     SENSOR) Kit                                                        

 

     flash      2023-0      Yes       [id number] 1{kit}      1 Kit           Unive

rs



     glucose      2-14                               daily.           ity of



     scanning      00:00:                                              [place]



     reader      00                                                Medical



     (FREESTYLE                                                        Branch



     DENISE 2                                                        



     READER)                                                        



     Misc                                                        

 

     flash      2023-0      Yes       [id number] 1{kit}      1 Kit           Unive

rs



     glucose      2-14                               every 14           ity of



     sensor      00:00:                               (fourteen)           [place]



     (FREESTYLE      00                                 days.           Medical



     DENISE 2                                                        Branch



     SENSOR) Kit                                                        

 

     flash      2023-0      Yes       [id number] 1{kit}      1 Kit           Unive

rs



     glucose      2-14                               daily.           ity of



     scanning      00:00:                                              [place]



     reader      00                                                Medical



     (FREESTYLE                                                        Branch



     DENISE 2                                                        



     READER)                                                        



     Misc                                                        

 

     flash      2023-0      Yes       [id number] 1{kit}      1 Kit           Unive

rs



     glucose      2-14                               every 14           ity of



     sensor      00:00:                               (fourteen)           [place]



     (FREESTYLE      00                                 days.           Medical



     DENISE 2                                                        Branch



     SENSOR) Kit                                                        

 

     flash      2023-0      Yes       [id number] 1{kit}      1 Kit           Unive

rs



     glucose      2-14                               daily.           ity of



     scanning      00:00:                                              [place]



     reader      00                                                Medical



     (FREESTYLE                                                        Branch



     DENISE 2                                                        



     READER)                                                        



     Misc                                                        

 

     flash      2023-0      Yes       [id number] 1{kit}      1 Kit           Unive

rs



     glucose      2-14                               every 14           ity of



     sensor      00:00:                               (fourteen)           [place]



     (FREESTYLE      00                                 days.           Medical



     DENISE 2                                                        Branch



     SENSOR) Kit                                                        

 

     flash      2023-0      Yes       [id number] 1{kit}      1 Kit           Unive

rs



     glucose      2-14                               daily.           ity of



     scanning      00:00:                                              [place]



     reader      00                                                Medical



     (FREESTYLE                                                        Branch



     DENISE 2                                                        



     READER)                                                        



     Misc                                                        

 

     flash      2023-0      Yes       [id number] 1{kit}      1 Kit           Unive

rs



     glucose      2-14                               every 14           ity of



     sensor      00:00:                               (fourteen)           [place]



     (FREESTYLE      00                                 days.           Medical



     DENISE 2                                                        Branch



     SENSOR) Kit                                                        

 

     flash      2023-0      Yes       [id number] 1{kit}      1 Kit           Unive

rs



     glucose      2-14                               daily.           ity of



     scanning      00:00:                                              [place]



     reader      00                                                Medical



     (FREESTYLE                                                        Branch



     DENISE 2                                                        



     READER)                                                        



     Misc                                                        

 

     flash      2023-0      Yes       [id number] 1{kit}      1 Kit           Unive

rs



     glucose      2-14                               every 14           ity of



     sensor      00:00:                               (fourteen)           [place]



     (FREESTYLE      00                                 days.           Medical



     DENISE 2                                                        Branch



     SENSOR) Kit                                                        

 

     flash      2023-0      Yes       [id number] 1{kit}      1 Kit           Unive

rs



     glucose      2-14                               daily.           ity of



     scanning      00:00:                                              [place]



     reader      00                                                Medical



     (FREESTYLE                                                        Branch



     DENISE 2                                                        



     READER)                                                        



     Misc                                                        

 

     flash      2023-0      Yes       [id number] 1{kit}      1 Kit           Unive

rs



     glucose      2-14                               every 14           ity of



     sensor      00:00:                               (fourteen)           [place]



     (FREESTYLE      00                                 days.           Medical



     DENISE 2                                                        Branch



     SENSOR) Kit                                                        

 

     flash      2023-0      Yes       [id number] 1{kit}      1 Kit           Unive

rs



     glucose      2-14                               daily.           ity of



     scanning      00:00:                                              [place]



     reader      00                                                Medical



     (FREESTYLE                                                        Branch



     DENISE 2                                                        



     READER)                                                        



     Misc                                                        

 

     flash      2023-0      Yes       [id number] 1{kit}      1 Kit           Unive

rs



     glucose      2-14                               every 14           ity of



     sensor      00:00:                               (fourteen)           [place]



     (FREESTYLE      00                                 days.           Medical



     DENISE 2                                                        Branch



     SENSOR) Kit                                                        

 

     flash      2023-0      Yes       [id number] 1{kit}      1 Kit           Unive

rs



     glucose      2-14                               daily.           ity of



     scanning      00:00:                                              [place]



     reader      00                                                Medical



     (FREESTYLE                                                        Branch



     DENISE 2                                                        



     READER)                                                        



     Misc                                                        

 

     flash      2023-0      Yes       [id number] 1{kit}      1 Kit           Unive

rs



     glucose      2-14                               every 14           ity of



     sensor      00:00:                               (fourteen)           [place]



     (FREESTYLE      00                                 days.           Medical



     DENISE 2                                                        Branch



     SENSOR) Kit                                                        

 

     flash      2023-0      Yes       [id number] 1{kit}      1 Kit           Unive

rs



     glucose      2-14                               daily.           ity of



     scanning      00:00:                                              [place]



     reader      00                                                Medical



     (FREESTYLE                                                        Branch



     DENISE 2                                                        



     READER)                                                        



     Misc                                                        

 

     flash      2023-0      Yes       [id number] 1{kit}      1 Kit           Unive

rs



     glucose      2-14                               every 14           ity of



     sensor      00:00:                               (fourteen)           [place]



     (FREESTYLE      00                                 days.           Medical



     DENISE 2                                                        Branch



     SENSOR) Kit                                                        

 

     flash      2023-0      Yes       [id number] 1{kit}      1 Kit           Unive

rs



     glucose      2-14                               daily.           ity of



     scanning      00:00:                                              [place]



     reader      00                                                Medical



     (FREESTYLE                                                        Branch



     DENISE 2                                                        



     READER)                                                        



     Misc                                                        

 

     flash      2023-0      Yes       [id number] 1{kit}      1 Kit           Unive

rs



     glucose      2-14                               every 14           ity of



     sensor      00:00:                               (fourteen)           [place]



     (FREESTYLE      00                                 days.           Medical



     DENISE 2                                                        Branch



     SENSOR) Kit                                                        

 

     HYDROcodone      3-0 - No             1{tbl}      1 tablet,         

  [facility]



     -acetaminop                                Oral,           ity of



     hen (NORCO)      21:15: 20:14                          ONCE, 1           [facility]

[place]



      mg      00   :00                           dose, On           Medica

l



     tablet 1                                         Mon            Branch



     tablet                                         23 at           



                                                  1515,           



                                                  Routine           

 

     flash      2023-0      Yes       [id number] 1{kit}      1 Kit           Unive

rs



     glucose      2-12                               every 14           ity of



     sensor      00:00:                               (fourteen)           [place]



     (FREESTYLE      00                                 days.           Medical



     DENISE 2                                                        Branch



     SENSOR) Kit                                                        

 

     flash      2023-0      Yes       [id number] 1{kit}      1 Kit           Unive

rs



     glucose      2-12                               daily.           ity of



     scanning      00:00:                                              [place]



     reader      00                                                Medical



     (FREESTYLE                                                        Branch



     DENISE 2                                                        



     READER)                                                        



     Misc                                                        

 

     flash      2023-0      Yes       [id number] 1{kit}      1 Kit           Unive

rs



     glucose      2-12                               every 14           ity of



     sensor      00:00:                               (fourteen)           [place]



     (FREESTYLE      00                                 days.           Medical



     DENISE 2                                                        Branch



     SENSOR) Kit                                                        

 

     flash      2023-0      Yes       [id number] 1{kit}      1 Kit           Unive

rs



     glucose      2-12                               daily.           ity of



     scanning      00:00:                                              [place]



     reader      00                                                Medical



     (FREESTYLE                                                        Branch



     DENISE 2                                                        



     READER)                                                        



     Misc                                                        

 

     flash      2023-0 2023- No        [id number] 1{kit}      1 Kit           Univ

ers



     glucose      2-12 14                          every 14           ity of



     sensor      00:00: 00:00                          (fourteen)           [place]

[place]



     (FREESTYLE      00   :00                           days.           Medical



     DENISE 2                                                        Branch



     SENSOR) Kit                                                        

 

     flash      2023- No        [id number] 1{kit}      1 Kit           Univ

ers



     glucose      2-14                          daily.           ity of



     scanning      00:00: 00:00                                         [place]



     reader      00   :00                                          Medical



     (FREESTYLE                                                        Branch



     DENISE 2                                                        



     READER)                                                        



     Misc                                                        

 

     insulin      2023- No             12U       12 Units,           Univ

ers



     regular                                Slow IV           ity of



     human      02:00: 01:32                          [facility]           [place]



     (HUMULIN R)      00   :00                           ONCE, 1           Medic

al



     injection                                         dose, On           Branch



     12 Units                                         Fri            



                                                  2/10/23 at           



                                                  2000,           



                                                  Routine<br           



                                                  >Indicatio           



                                                  n for           



                                                  insulin:           



                                                  Hyperglyce           



                                                  jarvis            

 

     cefTRIAXone      2023- No             1000mg      1,000 mg,         

  [facility]



     (ROCEPHIN)                                IV             ity of



     1,000 mg in      01:30: 02:39                          Piggyback,          

 [place]



     NaCl 0.9%      00   :00                           ONCE, 1           Medical



     (NS) 50 mL                                         dose, On           Branc

h



     MINI-BAG                                         Fri            



                                                  2/10/23 at           



                                                  1930,           



                                                  Administer           



                                                  over 30           



                                                  Minutes,           



                                                  50             



                                                  mL<br>Reas           



                                                  on for           



                                                  Anti-Infec           



                                                  tive:           



                                                  Documented           



                                                  Infection<           



                                                  br>Documen           



                                                  huma            



                                                  Infection           



                                                  Site:           



                                                  Urine<br&g           



                                                  t;Duration           



                                                  of             



                                                  Therapy: 7           



                                                  days           

 

     NaCl 0.9%      2023- No             1000mL      at 999           Uni

vers



     (NS) bolus                                mL/hr,           ity of



     infusion      00:00: 02:51                          1,000 mL,           Eric

as



     1,000 mL      00   :00                           IV             Medical



                                                  Infusion,           Branch



                                                  ONCE, 1           



                                                  dose, On           



                                                  Fri            



                                                  2/10/23 at           



                                                  1800, ASAP           

 

     proMETHazin      2023- No             25mg      25 mg, IV           

[facility]



     e         2-10 02-10                          Piggyback,           ity of



     (PHENERGAN)      23:15: 23:59                          ONCE, 1           [facility]

[facility]



     25 mg in      00   :00                           dose, On           Medical



     NaCl 0.9%                                         Fri            Branch



     (NS) 50 mL                                         2/10/23 at           



     IV                                           1715, ASAP           



     piggyback                                                        

 

     FENTanyl PF      2023- No             50ug      50 mcg,           Un

[facility]



     (SUBLIMAZE      2-10 02-10                          Slow IV           ity o

f



     (PF))      01:15: 00:23                          Push,           [place]



     injection      00   :00                           ONCE, 1           Medical



     50 mcg                                         dose, On           Branch



                                                  Thu 23           



                                                  at 1915,           



                                                  Routine           

 

     insulin      2023- No             10U       10 Units,           Univ

ers



     regular                                Slow IV           ity of



     human      22:45: 22:13                          Push,           [place]



     (HUMULIN R)      00   :00                           ONCE, 1           Medic

al



     injection                                         dose, On           Branch



     10 Units                                         u 23           



                                                  at 1645,           



                                                  Routine<br           



                                                  >Indicatio           



                                                  n for           



                                                  insulin:           



                                                  Hyperglyce           



                                                  jarvis            

 

     NaCl 0.9%      2023- No             1000mL      at 999           Uni

vers



     (NS) bolus      -10                          mL/hr,           ity of



     infusion      22:00: 00:01                          1,000 mL,           Eric

as



     1,000 mL      00   :00                           IV             Medical



                                                  Infusion,           Branch



                                                  ONCE, 1           



                                                  dose, On           



                                                  u 23           



                                                  at 1600,           



                                                  ASAP           

 

     proMETHazin      2023- No             25mg      25 mg, IV           

[facility]



     e                                   Piggyback,           ity of



     (PHENERGAN)      21:30: 22:13                          ONCE, 1           [facility]

[place]



     25 mg in      00   :00                           dose, On           Medical



     NaCl 0.9%                                         Thu 23           Bran

ch



     (NS) 50 mL                                         at 1530,           



     IV                                           ASAP           



     piggyback                                                        

 

     enoxaparin            Yes            40mg      40 mg,           Unive

rs



     (LOVENOX)                                     Subcutaneo           ity 

of



     injection      23:00:                               us, DAILY,           [facility]

[place]



     40 mg      00                                 First dose           Medical



                                                  on u           Branch



                                                  23 at           



                                                  1700,           



                                                  Until           



                                                  Discontinu           



                                                  ed,            



                                                  Routine           

 

     HYDROmorpho            Yes            4mg       Take 4 mg           U

nivers



     ne 4 mg      02                               by mouth           ity of



     tablet      19:25:                               in the           [place]



               48                                 morning           Medical



                                                  and 4 mg           [facility]



                                                  at noon           



                                                  and 4 mg           



                                                  in the           



                                                  evening.           

 

     insulin NPH            Yes            45U       inject 45           U

nivers



     human      2-02                               Units           ity of



     isophane      19:25:                               under the           [place]

[facility]



     (NOVOLIN N      48                                 skin 2           Medical



     SC)                                          (two)           [facility]



                                                  times           



                                                  daily.           

 

     HYDROmorpho      3-0      Yes            4mg       Take 4 mg           U

nivers



     ne 4 mg      2-02                               by mouth           ity of



     tablet      19:25:                               in the           [place]



               48                                 morning           Medical



                                                  and 4 mg           [facility]



                                                  at noon           



                                                  and 4 mg           



                                                  in the           



                                                  evening.           

 

     insulin NPH      2023-0      Yes            45U       inject 45           U

nivers



     human      2-02                               Units           ity of



     isophane      19:25:                               under the           [place]

[facility]



     (NOVOLIN N      48                                 skin 2           Medical



     SC)                                          (two)           [facility]



                                                  times           



                                                  daily.           

 

     HYDROmorpho      2023-0      Yes            4mg       Take 4 mg           U

nivers



     ne 4 mg      2-02                               by mouth           ity of



     tablet      19:25:                               in the           [place]



               48                                 morning           Medical



                                                  and 4 mg           [facility]



                                                  at noon           



                                                  and 4 mg           



                                                  in the           



                                                  evening.           

 

     insulin NPH      2023-0      Yes            45U       inject 45           U

nivers



     human      2-02                               Units           ity of



     isophane      19:25:                               under the           [place]

[facility]



     (NOVOLIN N      48                                 skin 2           Medical



     SC)                                          (two)           [facility]



                                                  times           



                                                  daily.           

 

     HYDROmorpho      2023-0      Yes            4mg       Take 4 mg           U

nivers



     ne 4 mg      2-02                               by mouth           ity of



     tablet      19:25:                               in the           [place]



               48                                 morning           Medical



                                                  and 4 mg           [facility]



                                                  at noon           



                                                  and 4 mg           



                                                  in the           



                                                  evening.           

 

     insulin NPH      2023-0      Yes            45U       inject 45           U

nivers



     human      2-02                               Units           ity of



     isophane      19:25:                               under the           [place]

[facility]



     (NOVOLIN N      48                                 skin 2           Medical



     SC)                                          (two)           [facility]



                                                  times           



                                                  daily.           

 

     HYDROmorpho      2023-0      Yes            4mg       Take 4 mg           U

nivers



     ne 4 mg      2-02                               by mouth           ity of



     tablet      19:25:                               in the           [place]



               48                                 morning           Medical



                                                  and 4 mg           [facility]



                                                  at noon           



                                                  and 4 mg           



                                                  in the           



                                                  evening.           

 

     insulin NPH      2023-0      Yes            45U       inject 45           U

nivers



     human      2-02                               Units           ity of



     isophane      19:25:                               under the           [place]

[facility]



     (NOVOLIN N      48                                 skin 2           Medical



     SC)                                          (two)           [facility]



                                                  times           



                                                  daily.           

 

     HYDROmorpho      2023-0      Yes            4mg       Take 4 mg           U

nivers



     ne 4 mg      2-02                               by mouth           ity of



     tablet      19:25:                               in the           [place]



               48                                 morning           Medical



                                                  and 4 mg           [facility]



                                                  at noon           



                                                  and 4 mg           



                                                  in the           



                                                  evening.           

 

     insulin NPH      2023-0      Yes            45U       inject 45           U

nivers



     human      2-02                               Units           ity of



     isophane      19:25:                               under the           [place]

[facility]



     (NOVOLIN N      48                                 skin 2           Medical



     SC)                                          (two)           [facility]



                                                  times           



                                                  daily.           

 

     HYDROmorpho      2023-0      Yes            4mg       Take 4 mg           U

nivers



     ne 4 mg      2-02                               by mouth           ity of



     tablet      19:25:                               in the           [place]



               48                                 morning           Medical



                                                  and 4 mg           [facility]



                                                  at noon           



                                                  and 4 mg           



                                                  in the           



                                                  evening.           

 

     insulin NPH      2023-0      Yes            45U       inject 45           U

nivers



     human      2-02                               Units           ity of



     isophane      19:25:                               under the           [place]

[facility]



     (NOVOLIN N      48                                 skin 2           Medical



     SC)                                          (two)           [facility]



                                                  times           



                                                  daily.           

 

     HYDROmorpho      2023-0      Yes            4mg       Take 4 mg           U

nivers



     ne 4 mg      2-02                               by mouth           ity of



     tablet      19:25:                               in the           [place]



               48                                 morning           Medical



                                                  and 4 mg           [facility]



                                                  at noon           



                                                  and 4 mg           



                                                  in the           



                                                  evening.           

 

     insulin NPH      2023-0      Yes            45U       inject 45           U

nivers



     human      2-02                               Units           ity of



     isophane      19:25:                               under the           [place]

[facility]



     (NOVOLIN N      48                                 skin 2           Medical



     SC)                                          (two)           [facility]



                                                  times           



                                                  daily.           

 

     HYDROmorpho      2023-0      Yes            4mg       Take 4 mg           U

nivers



     ne 4 mg      2-02                               by mouth           ity of



     tablet      19:25:                               in the           [place]



               48                                 morning           Medical



                                                  and 4 mg           [facility]



                                                  at noon           



                                                  and 4 mg           



                                                  in the           



                                                  evening.           

 

     insulin NPH      2023-0      Yes            45U       inject 45           U

nivers



     human      2-02                               Units           ity of



     isophane      19:25:                               under the           [place]

[facility]



     (NOVOLIN N      48                                 skin 2           Medical



     SC)                                          (two)           [facility]



                                                  times           



                                                  daily.           

 

     HYDROmorpho      2023-0      Yes            4mg       Take 4 mg           U

nivers



     ne 4 mg      2-02                               by mouth           ity of



     tablet      19:25:                               in the           [place]



               48                                 morning           Medical



                                                  and 4 mg           [facility]



                                                  at noon           



                                                  and 4 mg           



                                                  in the           



                                                  evening.           

 

     insulin NPH      2023-0      Yes            45U       inject 45           U

nivers



     human      2-02                               Units           ity of



     isophane      19:25:                               under the           [place]

[facility]



     (NOVOLIN N      48                                 skin 2           Medical



     SC)                                          (two)           [facility]



                                                  times           



                                                  daily.           

 

     HYDROmorpho      2023-0      Yes            4mg       Take 4 mg           U

nivers



     ne 4 mg      2-02                               by mouth           ity of



     tablet      19:25:                               in the           [place]



               48                                 morning           Medical



                                                  and 4 mg           [facility]



                                                  at noon           



                                                  and 4 mg           



                                                  in the           



                                                  evening.           

 

     insulin NPH      2023-0      Yes            45U       inject 45           U

nivers



     human      2-02                               Units           ity of



     isophane      19:25:                               under the           [place]

[facility]



     (NOVOLIN N      48                                 skin 2           Medical



     SC)                                          (two)           [facility]



                                                  times           



                                                  daily.           

 

     HYDROmorpho      -0      Yes            4mg       Take 4 mg           U

nivers



     ne 4 mg                                     by mouth           ity of



     tablet      19:25:                               in the           [place]



               48                                 morning           Medical



                                                  and 4 mg           [facility]



                                                  at noon           



                                                  and 4 mg           



                                                  in the           



                                                  evening.           

 

     insulin NPH      -0      Yes            45U       inject 45           U

nivers



     human      2-02                               Units           ity of



     isophane      19:25:                               under the           [place]

[facility]



     (NOVOLIN N      48                                 skin 2           Medical



     SC)                                          (two)           [facility]



                                                  times           



                                                  daily.           

 

     magnesium            Yes            400mg      400 mg,           Univ

ers



     oxide                                     Oral, BID,           ity of



     (MAG-OX      14:00:                               First dose           [place]

[facility]



     400) tablet      00                                 on u           [facility]

l



     400 mg                                         23 at           [facility]



                                                  0800,           



                                                  Until           



                                                  Discontinu           



                                                  ed,            



                                                  Routine           

 

     Sliding      0      Yes                      Subcutaneo           Univ

ers



     Scale      -                               us, TID           ity of



     Insulin -      14:00:                               MEALS,           [place]



     Lispro      00                                 First dose           Medical



     (HumaLOG) +                                         on u           [facility]



     Fsbg                                         23 at           



     Testing                                         0800,           



                                                  Until           



                                                  Discontinu           



                                                  ed,            



                                                  Routine           

 

     magnesium      -0 - No             2g        2 g, IV           Univ

ers



     sulfate in                                Piggyback,           it

y of



     water 2      14:00: 15:41                          Administer           [facility]

as



     gram/50 mL      00   :00                           over 60           [facility]

l



     (4 %)                                         Minutes,           [facility]



     infusion 2                                         ONCE, 1           



     g                                            dose, On           



                                                  u 23           



                                                  at 0800,           



                                                  Routine           

 

     HYDROcodone      -0      Yes            1{tbl}      1 tablet,          

 [facility]



     -acetaminop                                     Oral,           ity of



     hen (NORCO)      13:47:                               Q8HPRN,           [facility]

as



      mg      52                                 Starting           [facility]

l



     tablet 1                                         on u           [facility]



     tablet                                         23 at           



                                                  0747,           



                                                  Until           



                                                  Discontinu           



                                                  ed,            



                                                  Routine,           



                                                  Pain           



                                                  (scale           



                                                  4-6)           

 

     glipiZIDE      -0      Yes            5mg       5 mg,           [facility]



     (GLUCOTROL)                                     Oral,           ity of



     tablet 5 mg      13:30:                               BIDAC,           [place]

[facility]



               00                                 First dose           [facility]



                                                  on u           [facility]



                                                  23 at           



                                                  0730,           



                                                  Until           



                                                  Discontinu           



                                                  ed,            



                                                  Routine           

 

     NaCl 0.9%      -0      Yes            1000mL      at 150           Univ

ers



     (NS) IV      2-02                               mL/hr, IV           ity of



     infusion      09:15:                               Infusion,           [place]

[facility]



     1,000 mL      00                                 CONTINUOUS           Medic

al



                                                  , Starting           [facility]



                                                  on Thu           



                                                  23 at           



                                                  0315,           



                                                  Until           



                                                  Discontinu           



                                                  ed,            



                                                  Routine           

 

     NaCl 0.9%      -0 2023- No             1000mL      at 999           Uni

vers



     (NS) bolus      202 02-02                          mL/hr,           ity of



     infusion      09:00: 10:08                          1,000 mL,           [facility]

as



     1,000 mL      00   :51                           IV             Medical



                                                  Infusion,           [facility]



                                                  ONCE, 1           



                                                  dose, On           



                                                  Thu 23           



                                                  at 0300,           



                                                  STAT           

 

     sennosides      0      Yes            8.6mg      8.6 mg,           Uni

vers



     (SENOKOT)      2-02                               Oral, BID,           ity 

of



     tablet 8.6      08:30:                               First dose           [facility]

[facility]



     mg        00                                 on u           Medical



                                                  23 at           [facility]



                                                  0230,           



                                                  Until           



                                                  Discontinu           



                                                  ed,            



                                                  Routine           

 

     docusate      0      Yes            100mg      100 mg,           Un[facility]

rs



     (COLACE)      202                               Oral, BID,           ity o

f



     capsule 100      08:30:                               First dose           

[place]



     mg        00                                 on u           Medical



                                                  23 at           [facility]



                                                  0230,           



                                                  Until           



                                                  Discontinu           



                                                  ed,            



                                                  Routine           

 

     insulin NPH      0      Yes            20U       20 Units,           U

nivers



     and regular      02                               Subcutaneo           it

y of



     human 70-30      08:30:                               us, BIDAC,           

[place]



     (70-30      00                                 First dose           Medical



     U-100                                         (after           [facility]



     INSULIN)                                         last           



     100 unit/mL                                         modificati           



     (70-30)                                         on) on Thu           



     injection                                         23 at           



     20 Units                                         0230,           



                                                  Until           



                                                  Discontinu           



                                                  ed,            



                                                  Routine           

 

     proCHLORper      0      Yes            10mg      10 mg,           Univ

ers



     azine      2-02                               Slow IV           ity of



     (COMPAZINE)      08:19:                               Push,           [place]



     injection      04                                 Q6HPRN,           Medical



     10 mg                                         Starting           [facility]



                                                  on u           



                                                  23 at           



                                                  0219,           



                                                  Until           



                                                  Discontinu           



                                                  ed,            



                                                  Routine,           



                                                  Nausea and           



                                                  Vomiting           



                                                  (N/V)           

 

     FENTanyl PF      -0      Yes            50ug      50 mcg,           [facility]

vers



     (SUBLIMAZE                                     Slow IV           ity of



     (PF))      05:33:                               Push,           [place]



     injection      35                                 [id number],           Medical



     50 mcg                                         Starting           [facility]



                                                  on 23 at           



                                                  2333,           



                                                  Until           



                                                  Discontinu           



                                                  ed,            



                                                  Routine,           



                                                  Pain           



                                                  (scale           



                                                  7-10)           

 

     sodium      2023- No             30g       30 g,           [facility]



     polystyrene                                Oral,           ity of



     sulfonate      03:15: 05:11                          ONCE, 1           [place]

s



     (KAYEXALATE      00   :00                           dose, On           Medi

sarah



     ) 15                                         23           [facility]



     gram/60 mL                                         at 2115,           



     suspension                                         Routine           



     30 g                                                        

 

     insulin      2023- No             5U        5 Units,           [facility]

rs



     regular                                Slow IV           ity of



     human      02:15: 01:10                          Push,           [place]



     (HUMULIN R)      00   :00                           ONCE, 1           Medic

al



     injection 5                                         dose, On           [facility]

ch



     Units                                         23           



                                                  at 2015,           



                                                  STAT<br>In           



                                                  dication           



                                                  for            



                                                  insulin:           



                                                  Hyperglyce           



                                                  jarvis            

 

     cefTRIAXone      2023- No             1000mg      1,000 mg,         

  [facility]



     (ROCEPHIN)                                IV             ity of



     1,000 mg in      01:45: 02:27                          Piggyback,          

 [place]



     NaCl 0.9%      00   :00                           ONCE, 1           Medical



     (NS) 50 mL                                         dose, On           [facility]

h



     MINI-BAG                                         23           



                                                  at 1945,           



                                                  Administer           



                                                  over 30           



                                                  Minutes,           



                                                  50             



                                                  mL<br&gt;R           



                                                  elmira for           



                                                  Anti-Infec           



                                                  tive:           



                                                  Documented           



                                                  Infection<           



                                                  br>Documen           



                                                  huma            



                                                  Infection           



                                                  Site:           



                                                  Urine<br&g           



                                                  t;Duration           



                                                  of             



                                                  Therapy:           



                                                  Other (see           



                                                  Comments)           

 

     acetaminoph      2023- No             1000mg      1,000 mg,         

  [facility]



     en                                  Oral,           ity of



     (TYLENOL)      01:30: 01:28                          ONCE, 1           [place]

[facility]



     tablet      00   :00                           dose, On           Medical



     1,000 mg                                         Wed 23           [facility]

h



                                                  at 1930,           



                                                  ASAP           

 

     iopamidol      2023- No        [id number] 80mL      80 mL,           U

nivers



     (ISOVUE                                Intravenou           ity o

f



     370-500 mL)      00:45: 00:45                          s, ONCE, 1          

 [place]



     injection      00   :00                           dose, On           Medica

l



     80 mL                                         Wed 23           [facility]



                                                  at 1845,           



                                                  Routine           

 

     FENTanyl PF      2023- No             75ug      75 mcg,           Un

yoselyn



     (SUBLIMAZE                                Slow IV           ity o

f



     (PF))      22:45: 22:19                          Push,           [place]



     injection      00   :00                           ONCE, 1           Medical



     75 mcg                                         dose, On           [facility]



                                                  Wed 23           



                                                  at 1645,           



                                                  STAT           

 

     insulin      2023- No             10U       10 Units,           [facility]

ers



     regular                                Slow IV           ity of



     human      22:30: 21:28                          Push,           [place]



     (HUMULIN R)      00   :00                           ONCE, 1           Med[facility]

al



     injection                                         dose, On           [facility]



     10 Units                                         Wed 23           



                                                  at 1630,           



                                                  STAT<br>In           



                                                  dication           



                                                  for            



                                                  insulin:           



                                                  Hyperglyce           



                                                  jarvis            

 

     NaCl 0.9%      2023- No             1000mL      at 999           [facility]

vers



     (NS) bolus                                mL/hr,           ity of



     infusion      22:30: 23:40                          1,000 mL,           [facility]

as



     1,000 mL      00   :00                           IV             Medical



                                                  Infusion,           [facility]



                                                  ONCE, 1           



                                                  dose, On           



                                                  Wed 23           



                                                  at 1630,           



                                                  STAT           

 

     lisinopriL      -0      Yes       [id number] 2.5mg      Take 1           

[facility]



     2.5 mg      1-01                               tablet by           ity of



     tablet      00:00:                               mouth in           [place]



                                                the            Medical



                                                  morning.           [place]

 

     lisinopriL      -0      Yes       [id number] 2.5mg      Take 1           

[facility]



     2.5 mg      1-01                               tablet by           ity of



     tablet      00:00:                               mouth in           [place]



                                                the            Medical



                                                  morning.           [place]

 

     lisinopriL      3-0      Yes       [id number] 2.5mg      Take 1           

[facility]



     2.5 mg      1-01                               tablet by           ity of



     tablet      00:00:                               mouth in           [place]



                                                the            Medical



                                                  morning.           [place]

 

     lisinopriL      3-0      Yes       [id number] 2.5mg      Take 1           

[facility]



     2.5 mg      1-01                               tablet by           ity of



     tablet      00:00:                               mouth in           [place]



                                                the            Medical



                                                  morning.           [place]

 

     lisinopriL      3-0      Yes       [id number] 2.5mg      Take 1           

[facility]



     2.5 mg      1-01                               tablet by           ity of



     tablet      00:00:                               mouth in           [place]



                                                the            Medical



                                                  morning.           [place]

 

     lisinopriL      3-0      Yes       [id number] 2.5mg      Take 1           

[facility]



     2.5 mg      1-01                               tablet by           ity of



     tablet      00:00:                               mouth in           [place]



                                                the            Medical



                                                  morning.           [place]

 

     lisinopriL      2023-0      Yes       [id number] 2.5mg      Take 1           

[facility]



     2.5 mg      1-01                               tablet by           ity of



     tablet      00:00:                               mouth in           [place]



                                                the            Medical



                                                  morning.           [facility]

 

     lisinopriL      2023-0      Yes       [id number] 2.5mg      Take 1           

[facility]



     2.5 mg      1-01                               tablet by           ity of



     tablet      00:00:                               mouth in           [place]



                                                the            Medical



                                                  morning.           [facility]

 

     lisinopriL      2023-0      Yes       [id number] 2.5mg      Take 1           

[facility]



     2.5 mg      1-01                               tablet by           ity of



     tablet      00:00:                               mouth in           [place]



                                                the            Medical



                                                  morning.           [facility]

 

     lisinopriL      2023-0      Yes       [id number] 2.5mg      Take 1           

[facility]



     2.5 mg      1-01                               tablet by           ity of



     tablet      00:00:                               mouth in           [place]



                                                the            Medical



                                                  morning.           [facility]

 

     lisinopriL      2023-0      Yes       [id number] 2.5mg      Take 1           

[facility]



     2.5 mg      1-01                               tablet by           ity of



     tablet      00:00:                               mouth in           [place]



                                                the            Medical



                                                  morning.           [facility]

 

     lisinopriL      2023-0      Yes       [id number] 2.5mg      Take 1           

[facility]



     2.5 mg      1-01                               tablet by           ity of



     tablet      00:00:                               mouth in           [place]



                                                the            Medical



                                                  morning.           [facility]

 

     lisinopriL      2023-0      Yes       [id number] 2.5mg      Take 1           

[facility]



     2.5 mg      1-01                               tablet by           ity of



     tablet      00:00:                               mouth in           [place]



                                                the            Medical



                                                  morning.           [facility]

 

     lisinopriL      2023-0      Yes       [id number] 2.5mg      Take 1           

[facility]



     2.5 mg      1-01                               tablet by           ity of



     tablet      00:00:                               mouth in           [place]



                                                the            Medical



                                                  morning.           [facility]

 

     lisinopriL      2023-0      Yes       [id number] 2.5mg      Take 1           

[facility]



     2.5 mg      1-01                               tablet by           ity of



     tablet      00:00:                               mouth in           [place]



                                                the            Medical



                                                  morning.           [facility]

 

     lisinopriL      2023-0      Yes       [id number] 2.5mg      Take 1           

[facility]



     2.5 mg      1-01                               tablet by           ity of



     tablet      00:00:                               mouth in           [place]



                                                the            Medical



                                                  morning.           [facility]

 

     lisinopriL      2023-0      Yes       [id number] 2.5mg      Take 1           

[facility]



     2.5 mg      1-01                               tablet by           ity of



     tablet      00:00:                               mouth in           [place]



                                                the            Medical



                                                  morning.           [facility]

 

     lisinopriL      2023-0      Yes       [id number] 2.5mg      Take 1           

[facility]



     2.5 mg      1-01                               tablet by           ity of



     tablet      00:00:                               mouth in           [place]



               00                                 the            Medical



                                                  morning.           [facility]

 

     lisinopriL      2023-0      Yes       [id number] 2.5mg      Take 1           

[facility]



     2.5 mg      1-01                               tablet by           ity of



     tablet      00:00:                               mouth in           [place]



                                                the            Medical



                                                  morning.           [facility]

 

     lisinopriL      2023-0      Yes       [id number] 2.5mg      Take 1           

[facility]



     2.5 mg      1-01                               tablet by           ity of



     tablet      00:00:                               mouth in           [place]



               00                                 the            Medical



                                                  morning.           [facility]

 

     lisinopriL      2023-0      Yes       [id number] 2.5mg      Take 1           

[facility]



     2.5 mg      1-01                               tablet by           ity of



     tablet      00:00:                               mouth in           [place]



                                                the            Medical



                                                  morning.           [facility]

 

     lisinopriL      2023-0      Yes       [id number] 2.5mg      Take 1           

[facility]



     2.5 mg      1-01                               tablet by           ity of



     tablet      00:00:                               mouth in           [place]



                                                the            Medical



                                                  morning.           [facility]

 

     lisinopriL      2023-0      Yes       [id number] 2.5mg      Take 1           

[facility]



     2.5 mg      1-01                               tablet by           ity of



     tablet      00:00:                               mouth in           [place]



                                                the            Medical



                                                  morning.           [facility]

 

     lisinopriL      2023-0      Yes       [id number] 2.5mg      Take 1           

[facility]



     2.5 mg      1-01                               tablet by           ity of



     tablet      00:00:                               mouth in           [place]



                                                the            Medical



                                                  morning.           [facility]

 

     lisinopriL      2023-0      Yes       [id number] 2.5mg      Take 1           

[facility]



     2.5 mg      1-01                               tablet by           ity of



     tablet      00:00:                               mouth in           [place]



                                                the            Medical



                                                  morning.           [facility]

 

     lisinopriL      2023-0      Yes       [id number] 2.5mg      Take 1           

[facility]



     2.5 mg      1-01                               tablet by           ity of



     tablet      00:00:                               mouth in           [place]



               00                                 the            Medical



                                                  morning.           [facility]

 

     lisinopriL      2023-0      Yes       [id number] 2.5mg      Take 1           

[facility]



     2.5 mg      1-01                               tablet by           ity of



     tablet      00:00:                               mouth in           [place]



               00                                 the            Medical



                                                  morning.           [facility]

 

     lisinopriL      2023-0      Yes       [id number] 2.5mg      Take 1           

[facility]



     2.5 mg      1-01                               tablet by           ity of



     tablet      00:00:                               mouth in           [place]



               00                                 the            Medical



                                                  morning.           [facility]

 

     lisinopriL      2023-0      Yes       [id number] 2.5mg      Take 1           

[facility]



     2.5 mg      1-01                               tablet by           ity of



     tablet      00:00:                               mouth in           [place]



                                                the            Medical



                                                  morning.           [facility]

 

     lisinopriL      2023-0      Yes       [id number] 2.5mg      Take 1           

[facility]



     2.5 mg      1-01                               tablet by           ity of



     tablet      00:00:                               mouth in           [place]



                                                the            Medical



                                                  morning.           [facility]

 

     lisinopriL      2023-0      Yes       [id number] 2.5mg      Take 1           

[facility]



     2.5 mg      1-01                               tablet by           ity of



     tablet      00:00:                               mouth in           [place]



                                                the            Medical



                                                  morning.           [facility]

 

     lisinopriL      2023-0      Yes       [id number] 2.5mg      Take 1           

[facility]



     2.5 mg      1-01                               tablet by           ity of



     tablet      00:00:                               mouth in           [place]



                                                the            Medical



                                                  morning.           [facility]

 

     lisinopriL      2023-0      Yes       [id number] 2.5mg      Take 1           

[facility]



     2.5 mg      1-01                               tablet by           ity of



     tablet      00:00:                               mouth in           [place]



                                                the            Medical



                                                  morning.           [facility]

 

     lisinopriL      2023-0      Yes       [id number] 2.5mg      Take 1           

[facility]



     2.5 mg      1-01                               tablet by           ity of



     tablet      00:00:                               mouth in           [place]



                                                the            Medical



                                                  morning.           [facility]

 

     lisinopriL      2023-0      Yes       [id number] 2.5mg      Take 1           

[facility]



     2.5 mg      1-01                               tablet by           ity of



     tablet      00:00:                               mouth in           [place]



                                                the            Medical



                                                  morning.           [facility]

 

     lisinopriL      2023-0      Yes       [id number] 2.5mg      Take 1           

[facility]



     2.5 mg      1-01                               tablet by           ity of



     tablet      00:00:                               mouth in           [place]



                                                the            Medical



                                                  morning.           [facility]

 

     lisinopriL      2023-0      Yes       [id number] 2.5mg      Take 1           

[facility]



     2.5 mg      1-01                               tablet by           ity of



     tablet      00:00:                               mouth in           [place]



                                                the            Medical



                                                  morning.           [facility]

 

     lisinopriL      2023-0      Yes       [id number] 2.5mg      Take 1           

[facility]



     2.5 mg      1-01                               tablet by           ity of



     tablet      00:00:                               mouth in           [place]



                                                the            Medical



                                                  morning.           [facility]

 

     lisinopriL      2023-0      Yes       [id number] 2.5mg      Take 1           

[facility]



     2.5 mg      1-01                               tablet by           ity of



     tablet      00:00:                               mouth in           [place]



                                                the            Medical



                                                  morning.           [facility]

 

     lisinopriL      2023-0      Yes       [id number] 2.5mg      Take 1           

[facility]



     2.5 mg      1-01                               tablet by           ity of



     tablet      00:00:                               mouth in           [place]



               00                                 the            Medical



                                                  morning.           [place]

 

     Nitrofurant      2022 No             100mg      100 mg,           U

nivers



     oin&Nit.                                Oral, BID,           ity 

of



     Macrocryst      02:00: 01:59                          10 doses,           [facility]

[place]



     (MACROBID)      00   :00                           First dose           Med

ical



     100 mg                                         on Mon           [facility]



     capsule 100                                         22 at           



     mg                                           2000, Last           



                                                  dose on           



                                                  Sat            



                                                  22           



                                                  at 0800,           



                                                  Routine<br           



                                                  >Reason           



                                                  for            



                                                  Anti-Infec           



                                                  tive:           



                                                  Empiric           



                                                  Therapy           



                                                  for            



                                                  Suspected           



                                                  Infection<           



                                                  br>Empiric           



                                                  Therapy           



                                                  Site:           



                                                  Urine<br>D           



                                                  uration of           



                                                  therapy:           



                                                  72 hours           

 

     ceFEPIme      2022 No             1000mg      1,000 mg,           U

nivers



     (MAXIPIME)                                IV             ity of



     1,000 mg in      21:15: 21:34                          [place], [place]



     NaCl 0.9%      00   :48                           ONCE, 1           Medical



     (NS) 50 mL                                         dose, On           [facility]

h



     MINI-BAG                                         22 at           



                                                  1515,           



                                                  Administer           



                                                  over 30           



                                                  Minutes,           



                                                  50             



                                                  mL<br>Reas           



                                                  on for           



                                                  Anti-Infec           



                                                  tive:           



                                                  Documented           



                                                  Infection<           



                                                  br>Documen           



                                                  huma            



                                                  Infection           



                                                  Site:           



                                                  Urine<br&g           



                                                  t;Duration           



                                                  of             



                                                  Therapy: 7           



                                                  days           

 

     HYDROmorpho      2022      Yes            4mg       Take 4 mg           U

nivers



     ne 4 mg      1-07                               by mouth 3           ity of



     tablet      17:55:                               (three)           [place]



               40                                 times           Medical



                                                  daily as           [facility]



                                                  needed.           

 

     HYDROmorpho      2022      Yes            4mg       Take 4 mg           U

nivers



     ne 4 mg      1-07                               by mouth 3           ity of



     tablet      17:55:                               (three)           [place]



               40                                 times           Medical



                                                  daily as           [facility]



                                                  needed.           

 

     HYDROmorpho      2022      Yes            4mg       Take 4 mg           U

nivers



     ne 4 mg      1-07                               by mouth 3           ity of



     tablet      17:55:                               (three)           [place]



               40                                 times           Medical



                                                  daily as           [facility]



                                                  needed.           

 

     HYDROmorpho      2022      Yes            4mg       Take 4 mg           U

nivers



     ne 4 mg      1-07                               by mouth 3           ity of



     tablet      17:55:                               (three)           [place]



               40                                 times           Medical



                                                  daily as           [facility]



                                                  needed.           

 

     HYDROmorpho      2022      Yes            4mg       Take 4 mg           U

nivers



     ne 4 mg      1-07                               by mouth 3           ity of



     tablet      17:55:                               (three)           [place]



               40                                 times           Medical



                                                  daily as           [facility]



                                                  needed.           

 

     HYDROmorpho      2022      Yes            4mg       Take 4 mg           U

nivers



     ne 4 mg      1-07                               by mouth 3           ity of



     tablet      17:55:                               (three)           [place]



               40                                 times           Medical



                                                  daily as           [facility]



                                                  needed.           

 

     HYDROmorpho      2022      Yes            4mg       Take 4 mg           U

nivers



     ne 4 mg      1-07                               by mouth 3           ity of



     tablet      17:55:                               (three)           [place]



               40                                 times           Medical



                                                  daily as           [facility]



                                                  needed.           

 

     HYDROmorpho      2022      Yes            4mg       Take 4 mg           U

nivers



     ne 4 mg      1-07                               by mouth 3           ity of



     tablet      17:55:                               (three)           [place]



               40                                 times           Medical



                                                  daily as           [facility]



                                                  needed.           

 

     HYDROmorpho      2022      Yes            4mg       Take 4 mg           U

nivers



     ne 4 mg      1-07                               by mouth 3           ity of



     tablet      17:55:                               (three)           [place]



               40                                 times           Medical



                                                  daily as           [facility]



                                                  needed.           

 

     HYDROmorpho      2022      Yes            4mg       Take 4 mg           U

nivers



     ne 4 mg      1-07                               by mouth 3           ity of



     tablet      17:55:                               (three)           [place]



               40                                 times           Medical



                                                  daily as           [facility]



                                                  needed.           

 

     HYDROmorpho      2022      Yes            4mg       Take 4 mg           U

nivers



     ne 4 mg      1-07                               by mouth 3           ity of



     tablet      17:55:                               (three)           [place]



               40                                 times           Medical



                                                  daily as           [facility]



                                                  needed.           

 

     HYDROmorpho      2022      Yes            4mg       Take 4 mg           U

nivers



     ne 4 mg      1-07                               by mouth 3           ity of



     tablet      17:55:                               (three)           [place]



               40                                 times           Medical



                                                  daily as           [facility]



                                                  needed.           

 

     enoxaparin      2022      Yes            40mg      40 mg,           Unive

rs



     (LOVENOX)                                     Subcutaneo           ity 

of



     injection      15:00:                               us, DAILY,           [facility]

[place]



     40 mg      00                                 First dose           Medical



                                                  on Mon           [facility]



                                                  22 at           



                                                  0900,           



                                                  Until           



                                                  Discontinu           



                                                  ed,            



                                                  Routine           

 

     Sliding      2022      Yes                      Subcutaneo           Univ

ers



     Scale      07                               us, TID           ity of



     Insulin -      03:00:                               MEALS+HS,           Eric

as



     Lispro      00                                 First dose           Medical



     (HumaLOG) +                                         on Sun           [facility]



     Fsbg                                         22 at           



     Testing                                         2100,           



                                                  Until           



                                                  Discontinu           



                                                  ed,            



                                                  Routine           

 

     FENTanyl PF      2022 No             75ug      75 mcg,           Un

[facility]



     (SUBLIMAZE                                Slow IV           ity o

f



     (PF))      01:15: 00:23                          Push,           [place]



     injection      00   :00                           ONCE, 1           Medical



     75 mcg                                         dose, On           [facility]



                                                  Sun            



                                                  22 at           



                                                  1915,           



                                                  Routine           

 

     dextrose      2022      Yes            250mL      250 mL, IV           Un

[facility]



     10% (D10W)                                     Infusion,           ity 

of



     bolus      01:12:                               PRN - SEE           [place]



     infusion      23                                 INSTRUCTIO           Medic

al



     250 mL                                         NS,            [facility]



                                                  Administer           



                                                  over 60           



                                                  Minutes,           



                                                  Other, If           



                                                  blood           



                                                  glucose is           



                                                  &amp;lt;           



                                                  or = 70           



                                                  mg/dL and           



                                                  patient is           



                                                  unable to           



                                                  swallow or           



                                                  has mental           



                                                  status           



                                                  changes,           



                                                  Starting           



                                                  on Sun           



                                                  22 at           



                                                  191<br>If           



                                                  blood           



                                                  glucose is           



                                                  < or = 70           



                                                  mg/dL and           



                                                  patient is           



                                                  unable to           



                                                  swallow or           



                                                  has mental           



                                                  status           



                                                  changes           



                                                  (Give           



                                                  glucagon           



                                                  order if           



                                                  patient           



                                                  needs           



                                                  fluid           



                                                  restrictio           



                                                  n):            



                                                  &nbsp;IF           



                                                  IV access           



                                                  available:           



                                                  Dextrose           



                                                  10%.           



                                                  &nbsp;1.           



                                                  125 mL (?           



                                                  bag) of           



                                                  D10W IV           



                                                  infusion -           



                                                  equivalent           



                                                  to 12.5 g           



                                                  dextrose           



                                                  &nbsp;2.           



                                                  Blood           



                                                  glucose -           



                                                  draw blood           



                                                  glucose 15           



                                                  minutes           



                                                  after D10W           



                                                  Administra           



                                                  tion.           



                                                  &amp;nbsp;           



                                                  3. If           



                                                  blood           



                                                  glucose is           



                                                  < 80           



                                                  mg/dL,           



                                                  repeat.<br           



                                                  >              

 

     glucagon      2022      Yes            1mg       1 mg,           [facility]



     (GLUCAGEN                                     Intramuscu           ity 

of



     DIAGNOSTIC      01:12:                               lar, PRN,           [facility]

[place]



     KIT)      20                                 Starting           Medical



     injection 1                                         on [facility]



     mg                                           22 at           



                                                  1912,           



                                                  Until           



                                                  Discontinu           



                                                  ed, ASAP,           



                                                  Blood           



                                                  Glucose           



                                                  &amp;lt;           



                                                  or = 70           



                                                  mg/dL and           



                                                  patient is           



                                                  unable to           



                                                  swallow or           



                                                  has mental           



                                                  changes.           

 

     HYDROmorpho      2022      Yes            4mg       4 mg,           Unive

rs



     ne                                       Oral,           ity of



     (DILAUDID)      01:08:                               Q6HPRN           [place]

[place]



     tablet 4 mg      42                                 Starting           Medi

[place]



                                                  on [facility]



                                                  22 at           



                                                  1908,           



                                                  Until           



                                                  Discontinu           



                                                  ed,            



                                                  Routine,           



                                                  Pain           



                                                  (scale           



                                                  7-10)           

 

     proMETHazin      2022      Yes            12.5mg      12.5 mg,           

[facility]



     e                                        Oral,           ity of



     (PHENERGAN)      01:06:                               Q4HPRN           Eric

as



     tablet 12.5      57                                 Starting           Medi

[place]



     mg                                           on [facility]



                                                  22 at           



                                                  1906,           



                                                  Until           



                                                  Discontinu           



                                                  ed,            



                                                  Routine,           



                                                  Nausea and           



                                                  Vomiting           



                                                  (N/V)           

 

     acetaminoph      2022      Yes            650mg      650 mg,           Un

[facility]



     en                                       Oral,           ity of



     (TYLENOL)      01:05:                               Q6HPRN,           [place]



     tablet 650      57                                 Starting           Medic

al



     mg                                           on Sun           Branch



                                                  22 at           



                                                  1905,           



                                                  Until           



                                                  Discontinu           



                                                  ed,            



                                                  Routine,           



                                                  Pain           



                                                  (scale           



                                                  1-3)           

 

     NaCl 0.9%      2022 No             1000mL      at 999           Uni

vers



     (NS) bolus      07                          mL/hr,           ity of



     infusion      00:30: 00:35                          1,000 mL,           Eric

as



     1,000 mL      00   :00                           IV             Medical



                                                  Infusion,           [facility]



                                                  ONCE, 1           



                                                  dose, On           



                                                  Sun            



                                                  22 at           



                                                  1830, STAT           

 

     Nitrofurant      2022 No        [id number] 100mg      Take 1         

  [facility]



     oin&Nit.       11-15                          capsule by           ity 

of



     Macrocryst      00:00: 05:59                          mouth in           [facility]

[place]



     100 mg      00   :00                           the            Medical



     capsule                                         morning           [facility]anch



                                                  and 1           



                                                  capsule in           



                                                  the            



                                                  evening.           



                                                  Do all           



                                                  this for 7           



                                                  days.           

 

     NaCl 0.9%      2022 No             1000mL      at 999           Uni

vers



     (NS) bolus                                mL/hr,           ity of



     infusion      23:45: 00:36                          1,000 mL,           Eric

as



     1,000 mL      00   :00                           IV             Medical



                                                  Infusion,           [facility]



                                                  ONCE, 1           



                                                  dose, On           



                                                  Sun            



                                                  22 at           



                                                  1745, ASAP           

 

     FENTanyl PF      2022- No             75ug      75 mcg,           Un

[facility]



     (SUBLIMAZE      06                          Slow IV           ity o

f



     (PF))      23:45: 23:19                          Push,           [place]



     injection      00   :00                           ONCE, 1           Medical



     75 mcg                                         dose, On           [facility]



                                                  Sun            



                                                  22 at           



                                                  1745,           



                                                  Routine           

 

     iopamidol      2022- No        [id number] 85mL      85 mL,           

[facility]



     (ISOVUE      0-30 10-30                          Intravenou           ity o

f



     370-500 mL)      03:00: 02:09                          s, ONCE, 1          

 [place]



     injection      00   :00                           dose, On           [facility]

l



     85 mL                                         Sat            [facility]



                                                  10/29/22           



                                                  at 2200,           



                                                  Routine           

 

     FENTanyl PF      2022- No             50ug      50 mcg,           Un

[facility]



     (SUBLIMAZE      0-30 10-30                          Slow IV           ity o

f



     (PF))      02:45: 01:47                          Push,           [place]



     injection      00   :00                           ONCE, 1           Medical



     50 mcg                                         dose, On           [facility]



                                                  Sat            



                                                  10/29/22           



                                                  at 2145,           



                                                  Routine           

 

     cefdinir      2022 No             300mg      300 mg,           Un[facility]

ers



     (OMNICEF)      0-30 10-30                          Oral,           ity of



     capsule 300      02:15: 03:14                          ONCE, 1           [facility]

[place]



     mg        00   :00                           dose, On           [facility]



                                                  Sat            [facility]



                                                  10/29/22           



                                                  at 2115,           



                                                  ASAP<br>Re           



                                                  ason for           



                                                  Anti-Infec           



                                                  tive:           



                                                  Documented           



                                                  Infection<           



                                                  br>Documen           



                                                  huma            



                                                  Infection           



                                                  Site:           



                                                  Urine<br>D           



                                                  uration of           



                                                  Therapy:           



                                                  Other (see           



                                                  Comments)           

 

     proMETHazin      2022 No             25mg      25 mg, IV           

[facility]



     e         0-30 10-30                          Piggyback,           ity of



     (PHENERGAN)      01:45: 01:47                          ONCE, 1           [facility]

[place]



     25 mg in      00   :00                           dose, On           Medical



     NaCl 0.9%                                         Sat            [facility]



     (NS) 50 mL                                         10/29/22           



     IV                                           at 2045,           



     piggyback                                         ASAP           

 

     cefdinir      2022      Yes       [id number] 300mg      Take 1           Un

[facility]



     300 mg      0-29                               capsule by           ity of



     capsule      00:00:                               mouth           [place]



               00                                 every 12           Medical



                                                  (twelve)           [facility]



                                                  hours.           

 

     cefdinir      2022      Yes       [id number] 300mg      Take 1           Un

[facility]



     300 mg      0-29                               capsule by           ity of



     capsule      00:00:                               mouth           [place]



               00                                 every 12           Medical



                                                  (twelve)           [facility]



                                                  hours.           

 

     cefdinir      2022- No        [id number] 300mg      Take 1           U

n[facility]



     300 mg      0-29 11-07                          capsule by           ity of



     capsule      00:00: 00:00                          mouth           [place]



               00   :00                           every 12           Medical



                                                  (twelve)           [facility]



                                                  hours.           

 

     HYDROmorpho      2022      Yes            4mg       Take 4 mg           U

nivers



     ne 4 mg      0-23                               by mouth 3           ity of



     tablet      12:52:                               (three)           [place]



               28                                 times           Medical



                                                  daily as           [facility]



                                                  needed.           

 

     HYDROmorpho      2022      Yes            4mg       Take 4 mg           U

nivers



     ne 4 mg      0-23                               by mouth 3           ity of



     tablet      12:52:                               (three)           [place]



               28                                 times           Medical



                                                  daily as           [facility]



                                                  needed.           

 

     HYDROmorpho      2022      Yes            4mg       Take 4 mg           U

nivers



     ne 4 mg      0-23                               by mouth 3           ity of



     tablet      12:52:                               (three)           [place]



               28                                 times           Medical



                                                  daily as           [facility]



                                                  needed.           

 

     HYDROmorpho      2022      Yes            4mg       Take 4 mg           U

nivers



     ne 4 mg      0-23                               by mouth 3           ity of



     tablet      12:52:                               (three)           [place]



               28                                 times           Medical



                                                  daily as           [facility]



                                                  needed.           

 

     HYDROmorpho      2022- No             .5mg      0.5 mg,           Un

[facility]



     ne        0-23 10-23                          Slow IV           ity of



     (DILAUDID)      03:15: 03:23                          Push,           [place]



     injection      00   :00                           ONCE, 1           Medical



     0.5 mg                                         dose, On           [facility]



                                                  Sat            



                                                  10/22/22           



                                                  at 2215,           



                                                  Routine<br           



                                                  >Use           



                                                  approved           



                                                  by             



                                                  (Faculty):           



                                                  [facility]            



                                                  PROVIDER           

 

     HYDROmorpho      2022- No             .5mg      0.5 mg,           Un

[facility]



     ne        0-22 10-22                          Slow IV           ity of



     (DILAUDID)      02:00: 02:00                          Push,           [place]



     injection      00   :00                           ONCE, 1           Medical



     0.5 mg                                         dose, On           [facility]



                                                  Fri            



                                                  10/21/22           



                                                  at 2100,           



                                                  Routine<br           



                                                  >Use           



                                                  approved           



                                                  by             



                                                  (Faculty):           



                                                  [facility]            



                                                  PROVIDER           

 

     doxycycline      2022 No             100mg      100 mg, IV         

  [facility]



     (VIBRAMYCIN      0-21 10-22                          Piggyback,           i

ty of



     ) 100 mg in      23:30: 20:05                          Q12H ABX,           

[place]



     NaCl 0.9%      00   :47                           10 doses,           Medic

al



     (NS) 100 mL                                         First dose           Br

anch



     MINI-BAG                                         on Fri           



                                                  10/21/22           



                                                  at 1830,           



                                                  Last dose           



                                                  on Wed           



                                                  10/26/22           



                                                  at 0630,           



                                                  Administer           



                                                  over 60           



                                                  Minutes,           



                                                  100            



                                                  mL<br>Reas           



                                                  on for           



                                                  Anti-Infec           



                                                  tive:           



                                                  Empiric           



                                                  Therapy           



                                                  for            



                                                  Suspected           



                                                  Infection<           



                                                  br>Empiric           



                                                  Therapy           



                                                  Site: Skin           



                                                  / Soft           



                                                  tissue<br>           



                                                  Duration           



                                                  of             



                                                  therapy:           



                                                  72 hours           

 

     enoxaparin      2022      Yes            40mg      40 mg,           Unive

rs



     (LOVENOX)      0                               Subcutaneo           ity 

of



     injection      22:00:                               us, DAILY,           [facility]

[facility]



     40 mg      00                                 First dose           Medical



                                                  on Fri           [facility]



                                                  10/21/22           



                                                  at 1700,           



                                                  Until           



                                                  Discontinu           



                                                  ed,            



                                                  Routine           

 

     ceFEPIme      2022 No             2000mg      2,000 mg,           U

nivers



     (MAXIPIME)      0-21 10-26                          IV             ity of



     2,000 mg [facility]      21:00: 20:59                          [facility]          

 [place]



     NaCl 0.9%      00   :00                           Q8H ABX,           Medica

l



     (NS) 50 mL                                         15 doses,           [facility]

ch



     MINI-BAG                                         First dose           



                                                  on Fri           



                                                  10/21/22           



                                                  at 1600,           



                                                  Last dose           



                                                  on Wed           



                                                  10/26/22           



                                                  at 0800,           



                                                  Administer           



                                                  over 4           



                                                  Hours, 50           



                                                  mL<br>Reas           



                                                  on for           



                                                  Anti-Infec           



                                                  tive:           



                                                  Empiric           



                                                  Therapy           



                                                  for            



                                                  Suspected           



                                                  Infection<           



                                                  br>Empiric           



                                                  Therapy           



                                                  Site:           



                                                  Abdominal<           



                                                  br>Duratio           



                                                  n of           



                                                  therapy: 5           



                                                  days           

 

     HYDROmorpho      2022      Yes            4mg       4 mg,           Unive

rs



     ne        0-21                               Oral,           ity of



     (DILAUDID)      19:38:                               Q4HPRN,           [place]

[place]



     tablet 4 mg      15                                 Starting           Medi

[place]



                                                  on Fri           [facility]



                                                  10/21/22           



                                                  at 1438,           



                                                  Until           



                                                  Discontinu           



                                                  ed,            



                                                  Routine,           



                                                  Pain           



                                                  (scale           



                                                  7-10)           

 

     sodium      2022      Yes                      Topical,           [facility]



     hypochlorit      0-21                               BID, First           it

y of



     e 0.125 %      18:30:                               dose on           [place]



     (DAKIN'S                  Medical



     SOLUTION)                                         10/21/22           [facility]



     solution                                         at 1330,           



                                                  Until           



                                                  Discontinu           



                                                  ed,            



                                                  Routine           

 

     ceFEPIme      2022- No             2000mg      2,000 mg,           U

nivers



     (MAXIPIME)      0-21 10-21                          IV             ity of



     2,000 mg in      13:15: 14:55                          [place], [place]



     NaCl 0.9%      00   :00                           ONCE, 1           Medical



     (NS) 50 mL                                         dose, On           [facility]

h



     MINI-BAG                                         Fri            



                                                  10/21/22           



                                                  at 0815,           



                                                  Administer           



                                                  over 30           



                                                  Minutes,           



                                                  50             



                                                  mL<br>Reas           



                                                  on for           



                                                  Anti-Infec           



                                                  tive:           



                                                  Empiric           



                                                  Therapy           



                                                  for            



                                                  Suspected           



                                                  Infection<           



                                                  br>Empiric           



                                                  Therapy           



                                                  Site:           



                                                  Urine<br>D           



                                                  uration of           



                                                  therapy: 5           



                                                  days           

 

     Sliding      2022      Yes                      Subcutaneo           Univ

ers



     Scale      0-21                               us, AC+HS,           ity of



     Insulin-Reg      12:30:                               First dose           

[place]



     ular + Fsbg      00                                 on Fri           Medica

l



     Testing                                         10/21/22           [facility]



                                                  at 0730,           



                                                  Until           



                                                  Discontinu           



                                                  ed,            



                                                  Routine           

 

     acetaminoph      2022      Yes            650mg      650 mg,           Un

[facility]



     en        0-21                               Oral,           ity of



     (TYLENOL)      12:08:                               Q6HPRN,           [place]



     tablet 650      12                                 Starting           Medic

al



     mg                                           on Fri           [facility]



                                                  10/21/22           



                                                  at 0708,           



                                                  Until           



                                                  Discontinu           



                                                  ed,            



                                                  Routine,           



                                                  Pain           



                                                  (scale           



                                                  1-3)           

 

     HYDROmorpho      2022- No             .5mg      0.5 mg,           Un

[facility]



     ne        0-21 10-                          Slow IV           ity of



     (DILAUDID)      12:07: 16:23                          Push,           [place]



     injection      21   :00                           Q4HPRN, 2           Medic

al



     0.5 mg                                         doses,           [facility]



                                                  Starting           



                                                  on Fri           



                                                  10/21/22           



                                                  at 0707,           



                                                  Until           



                                                  Discontinu           



                                                  ed,            



                                                  Routine,           



                                                  Pain           



                                                  (scale           



                                                  7-10)<br>U           



                                                  se             



                                                  approved           



                                                  by             



                                                  (Faculty):           



                                                  [facility]            



                                                  PROVIDER           

 

     proMETHazin      2022      Yes            12.5mg      12.5 mg,           

[facility]



     e         0                               IV             ity of



     (PHENERGAN)      12:07:                               Piggyback,           

[place]



     12.5 mg in      04                                 Q4HPRN,           Medica

l



     NaCl 0.9%                                         Starting           [facility]



     (NS) 50 mL                                         on Fri           



     IV                                           10/21/22           



     piggyback                                         at 0707,           



                                                  Until           



                                                  Discontinu           



                                                  ed,            



                                                  Routine,           



                                                  Nausea and           



                                                  Vomiting           



                                                  (N/V)           

 

     dextrose      2022      Yes            250mL      250 mL, IV           Un

[facility]



     10% (D10W)      0-21                               Infusion,           ity 

of



     bolus      11:02:                               PRN - SEE           [place]



     infusion      05                                 INSTRUCTIO           Medic

al



     250 mL                                         NS,            [facility]



                                                  Administer           



                                                  over 60           



                                                  Minutes,           



                                                  Other, If           



                                                  blood           



                                                  glucose is           



                                                  &amp;lt;           



                                                  or = 70           



                                                  mg/dL and           



                                                  patient is           



                                                  unable to           



                                                  swallow or           



                                                  has mental           



                                                  status           



                                                  changes,           



                                                  Starting           



                                                  on Fri           



                                                  10/21/22           



                                                  at             



                                                  0602<br>If           



                                                  blood           



                                                  glucose is           



                                                  < or = 70           



                                                  mg/dL and           



                                                  patient is           



                                                  unable to           



                                                  swallow or           



                                                  has mental           



                                                  status           



                                                  changes           



                                                  (Give           



                                                  glucagon           



                                                  order if           



                                                  patient           



                                                  needs           



                                                  fluid           



                                                  restrictio           



                                                  n):            



                                                  &nbsp;IF           



                                                  IV access           



                                                  available:           



                                                  Dextrose           



                                                  10%.           



                                                  &nbsp;1.           



                                                  125 mL (?           



                                                  bag) of           



                                                  D10W IV           



                                                  infusion -           



                                                  equivalent           



                                                  to 12.5 g           



                                                  dextrose           



                                                  &nbsp;2.           



                                                  Blood           



                                                  glucose -           



                                                  draw blood           



                                                  glucose 15           



                                                  minutes           



                                                  after D10W           



                                                  Administra           



                                                  tion.           



                                                  &amp;nbsp;           



                                                  3. If           



                                                  blood           



                                                  glucose is           



                                                  < 80           



                                                  mg/dL,           



                                                  repeat.<br           



                                                  >              

 

     acetaminoph      2022      Yes            650mg      650 mg,           Un

[facility]



     en        0-21                               Oral,           ity of



     (TYLENOL)      11:01:                               Q6HPRN,           [place]



     tablet 650      26                                 Starting           Medic

al



     mg                                           on Fri           [facility]



                                                  10/21/22           



                                                  at 0601,           



                                                  Until           



                                                  Discontinu           



                                                  ed,            



                                                  Routine,           



                                                  Pain           



                                                  (scale           



                                                  1-3)           

 

     iopamidol      2022- No        [id number] 100mL      100 mL,          

 [facility]



     (ISOVUE      0-21 10-21                          Intravenou           ity o

f



     370-500 mL)      08:00: 08:00                          s, ONCE, 1          

 [place]



     injection      00   :00                           dose, On           Medica

l



     100 mL                                         Fri            [facility]



                                                  10/21/22           



                                                  at 0300,           



                                                  Routine           

 

     ceFEPIme      2022 No             1000mg      1,000 mg,           U

n[facility]



     (MAXIPIME)      0-21 10-21                          IV             ity of



     injection      07:15: 07:36                          Piggyback,           [facility]

[facility]



     1,000 mg      00   :00                           ONCE, 1           Medical



                                                  dose, On           [facility]



                                                  Fri            



                                                  10/21/22           



                                                  at 0215,           



                                                  STAT<br>Re           



                                                  ason for           



                                                  Anti-Infec           



                                                  tive:           



                                                  Documented           



                                                  Infection<           



                                                  br>Documen           



                                                  huma            



                                                  Infection           



                                                  Site: Skin           



                                                  / Soft           



                                                  Tissue<br>           



                                                  Duration           



                                                  of             



                                                  Therapy:           



                                                  Other (see           



                                                  Comments)           

 

     FENTanyl PF      2022 No             50ug      50 mcg,           Un

[facility]



     (SUBLIMAZE      0-21 10-21                          Slow IV           ity o

f



     (PF))      06:30: 06:12                          Push,           [place]



     injection      00   :00                           ONCE, 1           Medical



     50 mcg                                         dose, On           [facility]



                                                  Fri            



                                                  10/21/22           



                                                  at 0130,           



                                                  ASAP           

 

     proMETHazin      2022 No             12.5mg      12.5 mg,          

 [facility]



     e         0-21 10-21                          IV             ity of



     (PHENERGAN)      06:15: 06:12                          Piggyback,          

 [place]



     12.5 mg in      00   :00                           ONCE, 1           Medica

l



     NaCl 0.9%                                         dose, On           [facility]



     (NS) 50 mL                                         Fri            



     IV                                           10/21/22           



     piggyback                                         at 0115,           



                                                  ASAP           

 

     insulin            Yes            26U       26 Units,           Unive

rs



     glargine      8-14                               Subcutaneo           ity o

f



     (LANTUS      02:00:                               us, [facility],           [place]



     U-100)      00                                 First dose           Medical



     injection                                         (after           [facility]



     26 Units                                         last           



                                                  modificati           



                                                  on) on Sat           



                                                  22 at           



                                                  2100,           



                                                  Until           



                                                  Discontinu           



                                                  ed,            



                                                  Routine           

 

     insulin      2022- No             24U       24 Units,           Univ

ers



     glargine      8-12 08-12                          Subcutaneo           ity 

of



     (LANTUS      20:38: 20:43                          us, ONCE,           [place]

[facility]



     U-100)      00   :00                           1 dose, On           Medical



     injection                                         Fri            [facility]



     24 Units                                         22 at           



                                                  1545, ASAP           

 

     sennosides-            Yes            1{tbl}      1 tablet,          

 [facility]



     docusate                                     Oral,           ity of



     sodium      14:00:                               DAILY,           [place]



     (SENOKOT-S)      00                                 First dose           Me

dical



     8.6-50 mg                                         on 



     per tablet                                         22 at           



     1 tablet                                         0900,           



                                                  Until           



                                                  Discontinu           



                                                  ed,            



                                                  Routine           

 

     polyethylen            Yes            17g       17 g,           Unive

rs



     e glycol                                     Oral,           ity of



     3350 powder      14:00:                               DAILY,           [place]

[facility]



     17 g      00                                 First dose           Medical



                                                  on Fri           [facility]



                                                  22 at           



                                                  0900,           



                                                  Until           



                                                  Discontinu           



                                                  ed,            



                                                  Routine           

 

     insulin NPH      2022- No             16U       16 Units,           

[facility]



     (HUMULIN N)                                Subcutaneo           i

ty of



     injection      14:00: 17:23                          us,            [place]



     16 Units      00   :36                           QAM+HS,           Medical



                                                  First dose           Br[facility]



                                                  (after           



                                                  last           



                                                  modificati           



                                                  on) on 22 at           



                                                  0900,           



                                                  Until           



                                                  Discontinu           



                                                  ed,            



                                                  Routine           

 

     Sliding            Yes                      Subcutaneo           Univ

ers



     Scale                                     us, TID           ity of



     Insulin -      13:00:                               MEALS+HS,           Eric

as



     Lispro      00                                 First dose           Medical



     (HumaLOG) +                                         (after           Br[facility]



     Fsbg                                         last           



     Testing                                         modificati           



                                                  on) on 22 at           



                                                  0800,           



                                                  Until           



                                                  Discontinu           



                                                  ed,            



                                                  Routine           

 

     insulin NPH      2022- No             8U        8 Units,           U

nivers



     (HUMULIN N)                                Subcutaneo           i

ty of



     injection 8      02:00: 12:34                          us, QHS,           T

[facility]



     Units      00   :51                           First dose           Medical



                                                  (after           Branch



                                                  last           



                                                  modificati           



                                                  on) on 22 at           



                                                  2100,           



                                                  Until           



                                                  Discontinu           



                                                  ed,            



                                                  Routine           

 

     repaglinide            Yes       [id number] .5mg      Take 1          

 [facility]



     0.5 mg                                     tablet by           ity of



     tablet      00:00:                               mouth in           [place]



               00                                 the            Medical



                                                  morning           Branch



                                                  and 1           



                                                  tablet at           



                                                  noon and 1           



                                                  tablet in           



                                                  the            



                                                  evening.           



                                                  Take           



                                                  before           



                                                  meals.           

 

     sodium            Yes       [id number]           Apply to           Un

[facility]



     hypochlorit      8-12                               area(s)           ity o

f



     e 0.25%      00:00:                               daily.           [place]



     solution      00                                                Medical



                                                                 Branch

 

     repaglinide      2022-0      Yes       [id number] .5mg      Take 1          

 [facility]



     0.5 mg      8-12                               tablet by           ity of



     tablet      00:00:                               mouth in           [place]



               00                                 the            Medical



                                                  morning           [place]



                                                  and 1           



                                                  tablet at           



                                                  noon and 1           



                                                  tablet in           



                                                  the            



                                                  evening.           



                                                  Take           



                                                  before           



                                                  meals.           

 

     sodium      2022-0      Yes       [id number]           Apply to           Un

[facility]



     hypochlorit      8-12                               area(s)           ity o

f



     e 0.25%      00:00:                               daily.           [place]



     solution      00                                                Medical



                                                                 Branch

 

     repaglinide      2022-0      Yes       [id number] .5mg      Take 1          

 [facility]



     0.5 mg      8-12                               tablet by           ity of



     tablet      00:00:                               mouth in           [place]



               00                                 the            Medical



                                                  morning           [place]



                                                  and 1           



                                                  tablet at           



                                                  noon and 1           



                                                  tablet in           



                                                  the            



                                                  evening.           



                                                  Take           



                                                  before           



                                                  meals.           

 

     sodium      2022-0      Yes       [id number]           Apply to           Un

[facility]



     hypochlorit      8-12                               area(s)           ity o

f



     e 0.25%      00:00:                               daily.           [place]



     solution      00                                                Medical



                                                                 Branch

 

     repaglinide      2022-0      Yes       [id number] .5mg      Take 1          

 [facility]



     0.5 mg      8-12                               tablet by           ity of



     tablet      00:00:                               mouth in           [place]



               00                                 the            Medical



                                                  morning           [place]



                                                  and 1           



                                                  tablet at           



                                                  noon and 1           



                                                  tablet in           



                                                  the            



                                                  evening.           



                                                  Take           



                                                  before           



                                                  meals.           

 

     sodium      2022-0      Yes       [id number]           Apply to           Un

[facility]



     hypochlorit      8-12                               area(s)           ity o

f



     e 0.25%      00:00:                               daily.           [place]



     solution      00                                                Medical



                                                                 Branch

 

     repaglinide      2022-0      Yes       [id number] .5mg      Take 1          

 [facility]



     0.5 mg      8-12                               tablet by           ity of



     tablet      00:00:                               mouth in           [place]



               00                                 the            Medical



                                                  morning           [place]



                                                  and 1           



                                                  tablet at           



                                                  noon and 1           



                                                  tablet in           



                                                  the            



                                                  evening.           



                                                  Take           



                                                  before           



                                                  meals.           

 

     sodium      2022-0      Yes       [id number]           Apply to           Un

[facility]



     hypochlorit      8-12                               area(s)           ity o

f



     e 0.25%      00:00:                               daily.           [place]



     solution      00                                                Medical



                                                                 Branch

 

     repaglinide      2022-0      Yes       [id number] .5mg      Take 1          

 [facility]



     0.5 mg      8-12                               tablet by           ity of



     tablet      00:00:                               mouth in           [place]



               00                                 the            Medical



                                                  morning           [place]



                                                  and 1           



                                                  tablet at           



                                                  noon and 1           



                                                  tablet in           



                                                  the            



                                                  evening.           



                                                  Take           



                                                  before           



                                                  meals.           

 

     sodium      2022-0      Yes       [id number]           Apply to           [facility]

[facility]



     hypochlorit      8-12                               area(s)           ity o

f



     e 0.25%      00:00:                               daily.           [address]



                                                                 [facility]

 

     repaglinide      2022-0      Yes       [id number] .5mg      Take 1          

 [facility]



     0.5 mg      8-12                               tablet by           ity of



     tablet      00:00:                               mouth in           [place]



               00                                 the            Medical



                                                  morning           [place]



                                                  and 1           



                                                  tablet at           



                                                  noon and 1           



                                                  tablet in           



                                                  the            



                                                  evening.           



                                                  Take           



                                                  before           



                                                  meals.           

 

     sodium      2022-0      Yes       [id number]           Apply to           [facility]

[facility]



     hypochlorit      8-12                               area(s)           ity o

f



     e 0.25%      00:00:                               daily.           [address]

 

     repaglinide      2022-0      Yes       [id number] .5mg      Take 1          

 [facility]



     0.5 mg      8-12                               tablet by           ity of



     tablet      00:00:                               mouth in           [address]



                                                  and 1           



                                                  tablet at           



                                                  noon and 1           



                                                  tablet in           



                                                  the            



                                                  evening.           



                                                  Take           



                                                  before           



                                                  meals.           

 

     sodium      2022-0      Yes       [id number]           Apply to           [facility]

[facility]



     hypochlorit      8-12                               area(s)           ity o

f



     e 0.25%      00:00:                               daily.           [address]



                                                                 Branch

 

     repaglinide      2022-0      Yes       [id number] .5mg      Take 1          

 [facility]



     0.5 mg      8-12                               tablet by           ity of



     tablet      00:00:                               mouth in           [address]



                                                  morning           [place]



                                                  and 1           



                                                  tablet at           



                                                  noon and 1           



                                                  tablet in           



                                                  the            



                                                  evening.           



                                                  Take           



                                                  before           



                                                  meals.           

 

     sodium      2022-0      Yes       [id number]           Apply to           [facility]

[facility]



     hypochlorit      8-12                               area(s)           ity o

f



     e 0.25%      00:00:                               daily.           [address]



                                                                 Branch

 

     repaglinide      2022-0      Yes       [id number] .5mg      Take 1          

 [facility]



     0.5 mg      8-12                               tablet by           ity of



     tablet      00:00:                               mouth in           [address]



                                                  morning           [place]



                                                  and 1           



                                                  tablet at           



                                                  noon and 1           



                                                  tablet in           



                                                  the            



                                                  evening.           



                                                  Take           



                                                  before           



                                                  meals.           

 

     sodium      2022-0      Yes       [id number]           Apply to           [facility]

[facility]



     hypochlorit      8-12                               area(s)           ity o

f



     e 0.25%      00:00:                               daily.           [place]



     solution                                                      Medical



                                                                 [facility]

 

     repaglinide      -0      Yes       [id number] .5mg      Take 1          

 [facility]



     0.5 mg      8-12                               tablet by           [facility]



     tablet      00:00:                               mouth in           [place]



               00                                 the            Medical



                                                  morning           [place]



                                                  and 1           



                                                  tablet at           



                                                  noon and 1           



                                                  tablet in           



                                                  the            



                                                  evening.           



                                                  Take           



                                                  before           



                                                  meals.           

 

     sodium      -0      Yes       [id number]           Apply to           [facility]

[facility]



     hypochlorit      8-12                               area(s)           ity o

f



     e 0.25%      00:00:                               daily.           [place]



     solution                                                      Medical



                                                                 [facility]

 

     repaglinide      -0      Yes       [id number] .5mg      Take 1          

 [facility]



     0.5 mg      8-12                               tablet by           ity of



     tablet      00:00:                               mouth in           [address]



                                                  morning           [place]



                                                  and 1           



                                                  tablet at           



                                                  noon and 1           



                                                  tablet in           



                                                  the            



                                                  evening.           



                                                  Take           



                                                  before           



                                                  meals.           

 

     sodium      -0      Yes       [id number]           Apply to           [facility]

[facility]



     hypochlorit      8-12                               area(s)           ity o

f



     e 0.25%      00:00:                               daily.           [place]



     solution      00                                                Medical



                                                                 [facility]

 

     sodium      2-0      Yes       [id number]           Apply to           [facility]

[facility]



     hypochlorit      8-12                               area(s)           ity o

f



     e 0.25%      00:00:                               daily.           [place]



     solution                                                      Medical



                                                                 [facility]

 

     sodium      2-0      Yes       [id number]           Apply to           [facility]

[facility]



     hypochlorit      8-12                               area(s)           ity o

f



     e 0.25%      00:00:                               daily.           [place]



     solution      00                                                Medical



                                                                 [facility]

 

     sodium      2-0      Yes       [id number]           Apply to           [facility]

[facility]



     hypochlorit      8-12                               area(s)           ity o

f



     e 0.25%      00:00:                               daily.           [place]



     solution      00                                                Medical



                                                                 [facility]

 

     sodium      2022-0      Yes       [id number]           Apply to           [facility]

[facility]



     hypochlorit      8-12                               area(s)           ity o

f



     e 0.25%      00:00:                               daily.           [place]



     solution      00                                                Medical



                                                                 [facility]

 

     sodium      2022-0      Yes       [id number]           Apply to           [facility]

[facility]



     hypochlorit      8-12                               area(s)           ity o

f



     e 0.25%      00:00:                               daily.           [place]



     solution      00                                                Medical



                                                                 [facility]

 

     sodium      2-0      Yes       [id number]           Apply to           [facility]

[facility]



     hypochlorit      8-12                               area(s)           ity o

f



     e 0.25%      00:00:                               daily.           [place]



     solution      00                                                Medical



                                                                 [facility]

 

     sodium      2022-0      Yes       [id number]           Apply to           [facility]

[facility]



     hypochlorit      8-12                               area(s)           ity o

f



     e 0.25%      00:00:                               daily.           [place]



     solution      00                                                Medical



                                                                 [facility]

 

     sodium      2022-0      Yes       [id number]           Apply to           [facility]

[facility]



     hypochlorit      8-12                               area(s)           ity o

f



     e 0.25%      00:00:                               daily.           [place]



     solution      00                                                Medical



                                                                 [facility]

 

     sodium      2022-0      Yes       [id number]           Apply to           [facility]

[facility]



     hypochlorit      8-12                               area(s)           ity o

f



     e 0.25%      00:00:                               daily.           [place]



     solution      00                                                Medical



                                                                 [facility]

 

     sodium      2022-0      Yes       [id number]           Apply to           [facility]

[facility]



     hypochlorit      8-12                               area(s)           ity o

f



     e 0.25%      00:00:                               daily.           [place]



     solution      00                                                Medical



                                                                 [facility]

 

     sodium      2022-0      Yes       [id number]           Apply to           [facility]

[facility]



     hypochlorit      8-12                               area(s)           ity o

f



     e 0.25%      00:00:                               daily.           [place]



     solution      00                                                Medical



                                                                 [facility]

 

     sodium      2022-0      Yes       [id number]           Apply to           [facility]

[facility]



     hypochlorit      8-12                               area(s)           ity o

f



     e 0.25%      00:00:                               daily.           [place]



     solution      00                                                Medical



                                                                 [facility]

 

     sodium      2022-0      Yes       [id number]           Apply to           [facility]

[facility]



     hypochlorit      8-12                               area(s)           ity o

f



     e 0.25%      00:00:                               daily.           [place]



     solution      00                                                Medical



                                                                 [facility]

 

     sodium      2022-0      Yes       [id number]           Apply to           [facility]

[facility]



     hypochlorit      8-12                               area(s)           ity o

f



     e 0.25%      00:00:                               daily.           [place]



     solution      00                                                Medical



                                                                 [facility]

 

     sodium      2022-0      Yes       [id number]           Apply to           [facility]

[facility]



     hypochlorit      8-12                               area(s)           ity o

f



     e 0.25%      00:00:                               daily.           [place]



     solution      00                                                Medical



                                                                 [facility]

 

     sodium      2022-0      Yes       [id number]           Apply to           [facility]

[facility]



     hypochlorit      8-12                               area(s)           ity o

f



     e 0.25%      00:00:                               daily.           [place]



     solution      00                                                Medical



                                                                 Br[facility]

 

     sodium      2022-0      Yes       [id number]           Apply to           [facility]

[facility]



     hypochlorit      8-12                               area(s)           ity o

f



     e 0.25%      00:00:                               daily.           [place]



     solution      00                                                Medical



                                                                 Br[facility]

 

     sodium      2022-0      Yes       [id number]           Apply to           [facility]

[facility]



     hypochlorit      8-12                               area(s)           ity o

f



     e 0.25%      00:00:                               daily.           [place]



     solution      00                                                Medical



                                                                 [facility]

 

     sodium      2022-0      Yes       [id number]           Apply to           [facility]

[facility]



     hypochlorit      8-12                               area(s)           ity o

f



     e 0.25%      00:00:                               daily.           [place]



     solution      00                                                Medical



                                                                 Br[facility]

 

     sodium      2022-0      Yes       [id number]           Apply to           [facility]

[facility]



     hypochlorit      8-12                               area(s)           ity o

f



     e 0.25%      00:00:                               daily.           [place]



     solution      00                                                Medical



                                                                 [facility]

 

     sodium      2022-0      Yes       [id number]           Apply to           [facility]

[facility]



     hypochlorit      8-12                               area(s)           ity o

f



     e 0.25%      00:00:                               daily.           [place]



     solution      00                                                Medical



                                                                 [facility]

 

     sodium      2022-0      Yes       [id number]           Apply to           [facility]

[facility]



     hypochlorit      8-12                               area(s)           ity o

f



     e 0.25%      00:00:                               daily.           [place]



     solution      00                                                Medical



                                                                 [facility]

 

     sodium      2022-0      Yes       [id number]           Apply to           [facility]

[facility]



     hypochlorit      8-12                               area(s)           ity o

f



     e 0.25%      00:00:                               daily.           [place]



     solution      00                                                Medical



                                                                 Br[facility]

 

     sodium      2022-0      Yes       [id number]           Apply to           [facility]

[facility]



     hypochlorit      8-12                               area(s)           ity o

f



     e 0.25%      00:00:                               daily.           [place]



     solution      00                                                Medical



                                                                 Br[facility]

 

     sodium      2022-0      Yes       [id number]           Apply to           [facility]

[facility]



     hypochlorit      8-12                               area(s)           ity o

f



     e 0.25%      00:00:                               daily.           [place]



     solution      00                                                Medical



                                                                 Br[facility]

 

     sodium      2022-0      Yes       [id number]           Apply to           

[facility]



     hypochlorit      8-12                105            area(s)           ity o

f



     e 0.25%      00:00:                               daily.           [place]



     solution      00                                                Medical



                                                                 Branch

 

     sodium      2022-0      Yes       [id number]           Apply to           

[facility]



     hypochlorit      8-12                105            area(s)           ity o

f



     e 0.25%      00:00:                               daily.           [place]



     solution      00                                                Medical



                                                                 [facility]

 

     sodium      2022-0      Yes       [id number]           Apply to           

[facility]



     hypochlorit      8-12                105            area(s)           ity o

f



     e 0.25%      00:00:                               daily.           [place]



     solution      00                                                Medical



                                                                 Br[facility]

 

     sodium      2022-0      Yes       [id number]           Apply to           

[facility]



     hypochlorit      8-12                105            area(s)           ity o

f



     e 0.25%      00:00:                               daily.           [place]



     solution      00                                                Medical



                                                                 [facility]

 

     sodium      2022-0      Yes       [id number]           Apply to           

[facility]



     hypochlorit      8-12                105            area(s)           ity o

f



     e 0.25%      00:00:                               daily.           [place]



     solution      00                                                Medical



                                                                 [facility]

 

     sodium      2022-0      Yes       [id number]           Apply to           

[facility]



     hypochlorit      8-12                105            area(s)           ity o

f



     e 0.25%      00:00:                               daily.           [place]



     solution      00                                                Medical



                                                                 [facility]

 

     sodium      2022-0      Yes       [id number]           Apply to           

[facility]



     hypochlorit      8-12                105            area(s)           ity o

f



     e 0.25%      00:00:                               daily.           [place]



     solution      00                                                Medical



                                                                 Branch

 

     sodium      2022-0      Yes       [id number]           Apply to           

[facility]



     hypochlorit      8-12                105            area(s)           ity o

f



     e 0.25%      00:00:                               daily.           [place]



     solution      00                                                Medical



                                                                 Br[facility]

 

     sodium      2022-0      Yes       [id number]           Apply to           

[facility]



     hypochlorit      8-12                105            area(s)           ity o

f



     e 0.25%      00:00:                               daily.           [place]



     solution      00                                                Medical



                                                                 [facility]

 

     sodium      2022-0      Yes       [id number]           Apply to           

[facility]



     hypochlorit      8-12                105            area(s)           ity o

f



     e 0.25%      00:00:                               daily.           [place]



     solution      00                                                Medical



                                                                 Br[facility]

 

     sodium      2022-0      Yes       [id number]           Apply to           

[facility]



     hypochlorit      8-12                105            area(s)           ity o

f



     e 0.25%      00:00:                               daily.           [place]



     solution      00                                                Medical



                                                                 Branch

 

     sodium      2022-0      Yes       [id number]           Apply to           

[facility]



     hypochlorit      8-12                105            area(s)           ity o

f



     e 0.25%      00:00:                               daily.           [place]



     solution      00                                                Medical



                                                                 [facility]

 

     sodium      2022-0      Yes       [id number]           Apply to           

[facility]



     hypochlorit      8-12                105            area(s)           ity o

f



     e 0.25%      00:00:                               daily.           [place]



     solution      00                                                Medical



                                                                 Branch

 

     sodium      2022-0      Yes       [id number]           Apply to           

[facility]



     hypochlorit      8-12                105            area(s)           ity o

f



     e 0.25%      00:00:                               daily.           [place]



     solution      00                                                Medical



                                                                 [facility]

 

     sodium      2022-0      Yes       [id number]           Apply to           

[facility]



     hypochlorit      8-12                105            area(s)           ity o

f



     e 0.25%      00:00:                               daily.           [place]



     solution      00                                                Medical



                                                                 [facility]

 

     sodium      -0      Yes       [id number]           Apply to           

[facility]



     hypochlorit      8-12                105            area(s)           ity o

f



     e 0.25%      00:00:                               daily.           [place]



     solution      00                                                Medical



                                                                 [facility]

 

     sodium      -0      Yes       [id number]           Apply to           

[facility]



     hypochlorit      8-12                105            area(s)           ity o

f



     e 0.25%      00:00:                               daily.           [place]



     solution      00                                                Medical



                                                                 [facility]anch

 

     sodium      -0      Yes       [id number]           Apply to           

[facility]



     hypochlorit      8-12                105            area(s)           ity o

f



     e 0.25%      00:00:                               daily.           [place]



     solution      00                                                Medical



                                                                 [facility]

 

     sodium      -0      Yes       [id number]           Apply to           

[facility]



     hypochlorit      8-12                105            area(s)           ity o

f



     e 0.25%      00:00:                               daily.           [place]



     solution      00                                                Medical



                                                                 [facility]

 

     repaglinide      -0 - No        [id number] .5mg      Take 1         

  [facility]



     0.5 mg      27                          tablet by           ity of



     tablet      00:00: 00:00                          mouth in           [place]



               00   :00                           the            Medical



                                                  morning           [facility]



                                                  and 1           



                                                  tablet at           



                                                  noon and 1           



                                                  tablet in           



                                                  the            



                                                  evening.           



                                                  Take           



                                                  before           



                                                  meals.           

 

     repaglinide      -0 - No        [id number] .5mg      Take 1         

  [facility]



     0.5 mg      827                          tablet by           ity of



     tablet      00:00: 00:00                          mouth in           [place]



               00   :00                           the            Medical



                                                  morning           [facility]



                                                  and 1           



                                                  tablet at           



                                                  noon and 1           



                                                  tablet in           



                                                  the            



                                                  evening.           



                                                  Take           



                                                  before           



                                                  meals.           

 

     repaglinide      -0 - No        [id number] .5mg      Take 1         

  [facility]



     0.5 mg      8-27                          tablet by           ity of



     tablet      00:00: 00:00                          mouth in           [place]



               00   :00                           the            Medical



                                                  morning           [facility]



                                                  and 1           



                                                  tablet at           



                                                  noon and 1           



                                                  tablet in           



                                                  the            



                                                  evening.           



                                                  Take           



                                                  before           



                                                  meals.           

 

     apixaban 5      -0 2022- No             5mg       Take 1           [facility]

ers



     mg tablet      12                          tablet by           ity 

of



               00:00: 04:59                          mouth in           [place]



               00   :00                           the            Medical



                                                  morning           [facility]



                                                  and 1           



                                                  tablet in           



                                                  the            



                                                  evening.           



                                                  Do all           



                                                  this for           



                                                  30 days.           



                                                  Indication           



                                                  s:             



                                                  Symtomatic           



                                                  Superficia           



                                                  l Vein           



                                                  thrombosis           

 

     insulin      2022- No        [id number] 24U       inject 24          

 [facility]



     glargine                                Units           ity of



     100 unit/mL      00:00: 04:59                          under the           

[place]



     injection      00   :00                           skin at           [facility]



                                                  for 30           



                                                  days.           

 

     metFORMIN      -2022- No        [id number] 500mg      Take 1          

 [facility]



     500 mg                                tablet by           ity of



     tablet      00:00: 04:59                          mouth in           [place]



               00   :00                           the            Medical



                                                  morning           [place]



                                                  and 1           



                                                  tablet in           



                                                  the            



                                                  evening.           



                                                  Take with           



                                                  meals. Do           



                                                  all this           



                                                  for 30           



                                                  days.           

 

     dulaglutide      2022- No        [id number] .75mg      inject 1      

     [facility]



     (TRULICITY)                                Pen under           it

y of



     0.75 mg/0.5      00:00: 04:59                          the skin           [facility]

[facility]



     mL PnIj      00   :00                           weekly for           [facility]

l



                                                  30 days.           [facility]

 

     apixaban 5      2022- No             5mg       Take 1           [facility]

ers



     mg tablet                                tablet by           ity 

of



               00:00: 04:59                          mouth in           [place]



               00   :00                           the            Medical



                                                  morning           [place]



                                                  and 1           



                                                  tablet in           



                                                  the            



                                                  evening.           



                                                  Do all           



                                                  this for           



                                                  30 days.           



                                                  Indication           



                                                  s:             



                                                  Symtomatic           



                                                  Superficia           



                                                  l Vein           



                                                  thrombosis           

 

     insulin      2022- No        [id number] 24U       inject 24          

 [facility]



     glargine                                Units           ity of



     100 unit/mL      00:00: 04:59                          under the           

[place]



     injection      00   :00                           skin [facility]



                                                  for 30           



                                                  days.           

 

     metFORMIN      -2022- No        [id number] 500mg      Take 1          

 [facility]



     500 mg                                tablet by           ity of



     tablet      00:00: 04:59                          mouth in           [place]



               00   :00                           the            Medical



                                                  morning           [place]



                                                  and 1           



                                                  tablet in           



                                                  the            



                                                  evening.           



                                                  Take with           



                                                  meals. Do           



                                                  all this           



                                                  for 30           



                                                  days.           

 

     dulaglutide      -2022- No        [id number] .75mg      inject 1      

     [facility]



     (TRULICITY)                                Pen under           it

y of



     0.75 mg/0.5      00:00: 04:59                          the skin           [facility]

[facility]



     mL PnIj      00   :00                           weekly for           [facility]

l



                                                  30 days.           [facility]

 

     apixaban 5      -2022- No             5mg       Take 1           [facility]

ers



     mg tablet                                tablet by           ity 

of



               00:00: 04:59                          mouth in           [place]



               00   :00                           the            Medical



                                                  morning           [place]



                                                  and 1           



                                                  tablet in           



                                                  the            



                                                  evening.           



                                                  Do all           



                                                  this for           



                                                  30 days.           



                                                  Indication           



                                                  s:             



                                                  Symtomatic           



                                                  Superficia           



                                                  l Vein           



                                                  thrombosis           

 

     insulin      2022- No        [id number] 24U       inject 24          

 [facility]



     glargine                                Units           ity of



     100 unit/mL      00:00: 04:59                          under the           

[place]



     injection      00   :00                           skin [facility]



                                                  for 30           



                                                  days.           

 

     metFORMIN      -2022- No        [id number] 500mg      Take 1          

 [facility]



     500 mg                                tablet by           ity of



     tablet      00:00: 04:59                          mouth in           [place]



               00   :00                           the            [facility]



                                                  and 1           



                                                  tablet in           



                                                  the            



                                                  evening.           



                                                  Take with           



                                                  meals. Do           



                                                  all this           



                                                  for 30           



                                                  days.           

 

     dulaglutide      -2022- No        [id number] .75mg      inject 1      

     [facility]



     (TRULICITY)                                Pen under           it

y of



     0.75 mg/0.5      00:00: 04:59                          the skin           [facility]

[facility]



     mL PnIj      00   :00                           weekly for           [facility]

l



                                                  30 days.           [facility]

 

     apixaban 5      2022- No             5mg       Take 1           Univ

ers



     mg tablet                                tablet by           ity 

of



               00:00: 04:59                          mouth in           [place]



               00   :00                           the            [facility]



                                                  and 1           



                                                  tablet in           



                                                  the            



                                                  evening.           



                                                  Do all           



                                                  this for           



                                                  30 days.           



                                                  Indication           



                                                  s:             



                                                  Symtomatic           



                                                  Superficia           



                                                  l Vein           



                                                  thrombosis           

 

     insulin      2022- No        [id number] 24U       inject 24          

 [facility]



     glargine                                Units           ity of



     100 unit/mL      00:00: 04:59                          under the           

[place]



     injection      00   :00                           skin [facility]



                                                  for 30           



                                                  days.           

 

     metFORMIN      -2022- No        [id number] 500mg      Take 1          

 [facility]



     500 mg                                tablet by           ity of



     tablet      00:00: 04:59                          mouth in           [place]



               00   :00                           [facility]



                                                  and 1           



                                                  tablet in           



                                                  the            



                                                  evening.           



                                                  Take with           



                                                  meals. Do           



                                                  all this           



                                                  for 30           



                                                  days.           

 

     dulaglutide      2022- No        [id number] .75mg      inject 1      

     [facility]



     (TRULICITY)                                Pen under           it

y of



     0.75 mg/0.5      00:00: 04:59                          the skin           [facility]

[facility]



     mL PnIj      00   :00                           weekly for           [facility]

l



                                                  30 days.           [facility]anch

 

     linezolid      2022- No        [id number] 600mg      Take 1          

 [facility]



     600 mg      8-12 08-27                          tablet by           ity of



     tablet      00:00: 04:59                          mouth           [place]



               00   :00                           every 12           Medical



                                                  (twelve)           [facility]



                                                  hours for           



                                                  14 days.           

 

     fluconazole      -2022- No        [id number] 400mg      Take 2        

   [facility]



     200 mg      8-12 08-27                          tablets by           ity of



     tablet      00:00: 04:59                          mouth in           [place]



               00   :00                           the            Medical



                                                  morning           [facility]



                                                  for 14           



                                                  days.           

 

     ciprofloxac      2022- No        [id number] 500mg      Take 2        

   [facility]



     in HCl 250      8--27                          tablets by           it

y of



     mg tablet      00:00: 04:59                          mouth in           [place]

as



               00   :00                           the            Medical



                                                  morning           [facility]



                                                  and 2           



                                                  tablets in           



                                                  the            



                                                  evening.           



                                                  Do all           



                                                  this for           



                                                  14 days.           

 

     linezolid      2022- No        [id number] 600mg      Take 1          

 [facility]



     600 mg      8-12 08-27                          tablet by           ity of



     tablet      00:00: 04:59                          mouth           [place]



               00   :00                           every 12           Medical



                                                  (twelve)           [facility]



                                                  hours for           



                                                  14 days.           

 

     fluconazole      -2022- No        [id number] 400mg      Take 2        

   [facility]



     200 mg      8-12 08-27                          tablets by           ity of



     tablet      00:00: 04:59                          mouth in           [place]



               00   :00                           the            Medical



                                                  morning           [facility]



                                                  for 14           



                                                  days.           

 

     ciprofloxac      2022- No        [id number] 500mg      Take 2        

   [facility]



     in HCl 250      8- 08-27                          tablets by           it

y of



     mg tablet      00:00: 04:59                          mouth in           [place]

as



               00   :00                           the            Medical



                                                  morning           [facility]



                                                  and 2           



                                                  tablets in           



                                                  the            



                                                  evening.           



                                                  Do all           



                                                  this for           



                                                  14 days.           

 

     linezolid      -2022- No        [id number] 600mg      Take 1          

 [facility]



     600 mg      8-12 08-27                          tablet by           ity of



     tablet      00:00: 04:59                          mouth           [place]



               00   :00                           every 12           Medical



                                                  (twelve)           [facility]



                                                  hours for           



                                                  14 days.           

 

     fluconazole      -0 - No        [id number] 400mg      Take 2        

   [facility]



     200 mg      8-12 08-27                          tablets by           ity of



     tablet      00:00: 04:59                          mouth in           [place]



               00   :00                           the            Medical



                                                  morning           [facility]



                                                  for 14           



                                                  days.           

 

     ciprofloxac      -2022- No        [id number] 500mg      Take 2        

   [facility]



     in HCl 250      8-27                          tablets by           it

y of



     mg tablet      00:00: 04:59                          mouth in           [facility]

as



               00   :00                           the            [facility]



                                                  and 2           



                                                  tablets in           



                                                  the            



                                                  evening.           



                                                  Do all           



                                                  this for           



                                                  14 days.           

 

     linezolid      -2022- No        [id number] 600mg      Take 1          

 [facility]



     600 mg      8--27                          tablet by           ity of



     tablet      00:00: 04:59                          mouth           [place]



               00   :00                           every 12           Medical



                                                  (twelve)           [facility]



                                                  hours for           



                                                  14 days.           

 

     fluconazole      -2022- No        [id number] 400mg      Take 2        

   [facility]



     200 mg      8-27                          tablets by           ity of



     tablet      00:00: 04:59                          mouth in           [place]



               00   :00                           the            [facility]



                                                  for 14           



                                                  days.           

 

     ciprofloxac      -2022- No        [id number] 500mg      Take 2        

   [facility]



     in HCl 250      8-27                          tablets by           it

y of



     mg tablet      00:00: 04:59                          mouth in           [facility]

as



               00   :00                           [facility]



                                                  and 2           



                                                  tablets in           



                                                  the            



                                                  evening.           



                                                  Do all           



                                                  this for           



                                                  14 days.           

 

     gabapentin      -2022- No        [id number] 300mg      Take 1         

  [facility]



     300 mg      8- 08-20                          capsule by           ity of



     capsule      00:00: 04:59                          mouth in           [place]



               00   :00                           [facility]



                                                  and 1           



                                                  capsule at           



                                                  noon and 1           



                                                  capsule in           



                                                  the            



                                                  evening.           



                                                  Do all           



                                                  this for 7           



                                                  days.           

 

     gabapentin      -2022- No        [id number] 300mg      Take 1         

  [facility]



     300 mg      8-12 08-20                          capsule by           ity of



     capsule      00:00: 04:59                          mouth in           [place]



               00   :00                           [facility]



                                                  and 1           



                                                  capsule at           



                                                  noon and 1           



                                                  capsule in           



                                                  the            



                                                  evening.           



                                                  Do all           



                                                  this for 7           



                                                  days.           

 

     gabapentin      -2022- No        [id number] 300mg      Take 1         

  [facility]



     300 mg      8-12 08-20                          capsule by           ity of



     capsule      00:00: 04:59                          mouth in           [place]



               00   :00                           [facility]



                                                  and 1           



                                                  capsule at           



                                                  noon and 1           



                                                  capsule in           



                                                  the            



                                                  evening.           



                                                  Do all           



                                                  this for 7           



                                                  days.           

 

     gabapentin      -0 - No        [id number] 300mg      Take 1         

  [facility]



     300 mg      8-12 08-20                          capsule by           ity of



     capsule      00:00: 04:59                          mouth in           [place]



               00   :00                           the            Medical



                                                  morning           [facility]



                                                  and 1           



                                                  capsule at           



                                                  noon and 1           



                                                  capsule in           



                                                  the            



                                                  evening.           



                                                  Do all           



                                                  this for 7           



                                                  days.           

 

     sodium       No        [id number]           Apply to           U

nivers



     hypochlorit                                area(s)           ity 

of



     e 0.25%      00:00: 00:00                          daily.           [place]



     solution      00   :00                                          [facility]

 

     repaglinide       No        [id number] .5mg      Take 1         

  [facility]



     0.5 mg                                tablet by           ity of



     tablet      00:00: 00:00                          mouth in           [place]



               00   :00                           the            Medical



                                                  morning           [facility]



                                                  and 1           



                                                  tablet at           



                                                  noon and 1           



                                                  tablet in           



                                                  the            



                                                  evening.           



                                                  Take           



                                                  before           



                                                  meals. Do           



                                                  all this           



                                                  for 30           



                                                  days.           

 

     Sliding                             Subcutaneo           Uni

vers



     Scale                                , TID           ity of



     Insulin -      22:00: 12:34                          MEALS+HS,           [place]

[place]



     Lispro      00   :51                           First dose           Medical



     (HumaLOG) +                                         (after           [facility]



     Fsbg                                         last           



     Testing                                         modificati           



                                                  on) on Thu           



                                                  22 at           



                                                  1700,           



                                                  Until           



                                                  Discontinu           



                                                  ed,            



                                                  Routine           

 

     acetaminoph      0      Yes            1000mg      1,000 mg,          

 [facility]



     en        811                               Oral, Q8H,           ity of



     (TYLENOL)      19:00:                               First dose           [facility]

[place]



     tablet      00                                 on u           Medical



     1,000 mg                                         22 at           [facility]

h



                                                  1400,           



                                                  Until           



                                                  Discontinu           



                                                  ed,            



                                                  Routine           

 

     methocarbam            Yes            500mg      500 mg,           Un

[place]



     oL        8-11                               Oral, Q6H,           ity of



     (ROBAXIN)      17:00:                               First dose           Te

[place]



     tablet 500      00                                 on u           Medical



     mg                                           22 at           [facility]



                                                  1200,           



                                                  Until           



                                                  Discontinu           



                                                  ed,            



                                                  Routine           

 

     Sliding                             Subcutaneo           Uni

vers



     Scale                                , TID           ity of



     Insulin -      17:00: 19:31                          MEALS+HS,           [facility]

[place]



     Lispro      00   :30                           First dose           Medical



     (HumaLOG) +                                         (after           [facility]



     Fsbg                                         last           



     Testing                                         modificati           



                                                  on) on Thu           



                                                  22 at           



                                                  1200,           



                                                  Until           



                                                  Discontinu           



                                                  ed,            



                                                  Routine           

 

     insulin NPH            Yes            24U       24 Units,           U

nivers



     (HUMULIN N)                                     Subcutaneo           it

y of



     injection      14:00:                               us, QAM,           [place]

[place]



     24 Units      00                                 First dose           Medic

al



                                                  (after           [facility]



                                                  last           



                                                  modificati           



                                                  on) on Thu           



                                                  22 at           



                                                  0900,           



                                                  Until           



                                                  Discontinu           



                                                  ed,            



                                                  Routine           

 

     insulin NPH      2022- No             20U       20 Units,           

[facility]



     (HUMULIN N)                                Subcutaneo           i

ty of



     injection      14:00: 19:31                          us, QAM,           [facility]

as



     20 Units      00   :02                           First dose           Medic

al



                                                  (after           [facility]



                                                  last           



                                                  modificati           



                                                  on) on Thu           



                                                  22 at           



                                                  0900,           



                                                  Until           



                                                  Discontinu           



                                                  ed,            



                                                  Routine           

 

     insulin            Yes            5U        5 Units,           Univer

s



     lispro                                     Subcutaneo           ity of



     (human)      13:45:                               us, TID           [place]



     (HumaLOG      00                                 MEALS,           Medical



     U-100)                                         First dose           [facility]



     injection 5                                         (after           



     Units                                         last           



                                                  modificati           



                                                  on) on Thu           



                                                  22 at           



                                                  0845,           



                                                  Until           



                                                  Discontinu           



                                                  ed,            



                                                  Routine           

 

     apixaban            Yes            5mg       5 mg,           [facility]



     (ELIQUIS)                                     Oral, BID,           ity 

of



     tablet 5 mg      13:00:                               First dose           

[place]



               00                                 on Thu           Medical



                                                  22 at           [facility]



                                                  0800,           



                                                  Until           



                                                  Discontinu           



                                                  ed,            



                                                  Routine<br           



                                                  >Indicatio           



                                                  ns: DVT/PE           

 

     celecoxib            Yes            100mg      100 mg,           Univ

ers



     (CELEBREX)                                     Oral, BID           ity 

of



     capsule 100      13:00:                               MEALS,           [place]

[place]



     mg        00                                 First dose           Medical



                                                  on Thu           [facility]



                                                  22 at           



                                                  0800,           



                                                  Until           



                                                  Discontinu           



                                                  ed,            



                                                  Routine           

 

     gabapentin      0      Yes            300mg      300 mg,           Uni

vers



     (NEURONTIN)                                     Oral, TID,           it

y of



     capsule 300      13:00:                               First dose           

[place]



     mg        00                                 on Thu           Medical



                                                  22 at           [facility]



                                                  0800,           



                                                  Until           



                                                  Discontinu           



                                                  ed,            



                                                  Routine           

 

     HYDROmorpho      0      Yes            4mg       4 mg,           Unive

rs



     ne                                       Oral, TID,           ity of



     (DILAUDID)      13:00:                               First dose           T

[place]



     tablet 4 mg      00                                 (after           Medica

l



                                                  last           [facility]



                                                  modificati           



                                                  on) on Thu           



                                                  22 at           



                                                  0800,           



                                                  Until           



                                                  Discontinu           



                                                  ed,            



                                                  Routine           

 

     linezolid      2022-0 2022- No             600mg      600 mg,           Uni

vers



     (ZYVOX)                                Oral,           ity of



     tablet 600      03:15: 03:49                          Q12H, 1           [facility]

as



     mg        00   :00                           dose,           Medical



                                                  First dose           [facility]



                                                  on Wed           



                                                  8/10/22 at           



                                                  2215,           



                                                  ASAP<br>Re           



                                                  ason for           



                                                  Anti-Infec           



                                                  tive:           



                                                  Documented           



                                                  Infection<           



                                                  br>Documen           



                                                  huma            



                                                  Infection           



                                                  Site: Skin           



                                                  / Soft           



                                                  Tissue<br>           



                                                  Duration           



                                                  of             



                                                  Therapy: 7           



                                                  days<br>Re           



                                                  stricted           



                                                  use            



                                                  approved           



                                                  by: AUSTIN SPANGLER,           



                                                  ADULT ID           

 

     heparin      2022- No             5000U      5,000           [facility]



     (porcine)                                Units,           ity of



     injection      01:00: 12:21                          Subcutaneo           T

[place]



     5,000 Units      00   :15                           us, BID,           Medi

[place]



                                                  First dose           [facility]



                                                  on Wed           



                                                  8/10/22 at           



                                                  2000,           



                                                  Until           



                                                  Discontinu           



                                                  ed,            



                                                  Routine           

 

     insulin            Yes            10U       10 Units,           Unive

rs



     lispro      8-10                               Subcutaneo           ity of



     (human)      22:00:                               us, TID           [place]



     (HumaLOG      00                                 MEALS,           Medical



     U-100)                                         First dose           [facility]



     injection                                         (after           



     10 Units                                         last           



                                                  modificati           



                                                  on) on Wed           



                                                  8/10/22 at           



                                                  1700,           



                                                  Until           



                                                  Discontinu           



                                                  ed,            



                                                  Routine           

 

     insulin NPH            Yes            20U       20 Units,           U

nivers



     (HUMULIN N)      8-10                               Subcutaneo           it

y of



     injection      22:00:                               us, QPM,           [place]

s



     20 Units      00                                 First dose           Medic

al



                                                  (after           [facility]



                                                  last           



                                                  modificati           



                                                  on) on Wed           



                                                  8/10/22 at           



                                                  1700,           



                                                  Until           



                                                  Discontinu           



                                                  ed,            



                                                  Routine           

 

     insulin NPH       No             20U       20 Units,           

[facility]



     (HUMULIN N)      8-10 08-11                          Subcutaneo           i

ty of



     injection      22:00: 13:38                          us, QPM,           [facility]

as



     20 Units      00   :17                           First dose           Medic

al



                                                  (after           [facility]



                                                  last           



                                                  modificati           



                                                  on) on Wed           



                                                  8/10/22 at           



                                                  1700,           



                                                  Until           



                                                  Discontinu           



                                                  ed,            



                                                  Routine           

 

     HYDROmorpho            Yes            4mg       4 mg,           Unive

rs



     ne        8-10                               Oral,           ity of



     (DILAUDID)      15:00:                               TIDPRN,           [place]

[place]



     tablet 4 mg      00                                 Starting           Medi

sarah



                                                  on Wed           [facility]



                                                  8/10/22 at           



                                                  1000,           



                                                  Until           



                                                  Discontinu           



                                                  ed,            



                                                  Routine,           



                                                  Pain           



                                                  (scale           



                                                  7-10)           

 

     HYDROmorpho      2022- No             4mg       4 mg,           Univ

ers



     ne        8-10 08-11                          Oral,           ity of



     (DILAUDID)      15:00: 11:04                          TIDPRN,           Eric

as



     tablet 4 mg      00   :00                           Starting           Medi

sarah



                                                  on Wed           [facility]



                                                  8/10/22 at           



                                                  1000,           



                                                  Until u           



                                                  22 at           



                                                  0604,           



                                                  Routine,           



                                                  Pain           



                                                  (scale           



                                                  7-10)           

 

     insulin NPH      2022- No             20U       20 Units,           

[facility]



     (HUMULIN N)      8-10 08-10                          Subcutaneo           i

ty of



     injection      14:00: 18:34                          us, QAM,           Eric

as



     20 Units      00   :03                           First dose           Medic

al



                                                  (after           



                                                  last           



                                                  modificati           



                                                  on) on Wed           



                                                  8/10/22 at           



                                                  0900,           



                                                  Until           



                                                  Discontinu           



                                                  ed,            



                                                  Routine           

 

     Sliding            Yes                      Subcutaneo           Univ

ers



     Scale      8-10                               us, TID           ity of



     Insulin -      13:00:                               MEALS+HS,           Eric

as



     Lispro      00                                 First dose           Medical



     (HumaLOG) +                                         (after           



     Fsbg                                         last           



     Testing                                         modificati           



                                                  on) on Wed           



                                                  8/10/22 at           



                                                  0800,           



                                                  Until           



                                                  Discontinu           



                                                  ed,            



                                                  Routine           

 

     Sliding       No                       Subcutaneo           Uni

vers



     Scale      8-10 08-11                          us, TID           ity of



     Insulin -      13:00: 13:39                          MEALS+HS,           [facility]

[place]



     Lispro      00   :23                           First dose           Medical



     (HumaLOG) +                                         (after           



     Fsbg                                         last           



     Testing                                         modificati           



                                                  on) on Wed           



                                                  8/10/22 at           



                                                  0800,           



                                                  Until           



                                                  Discontinu           



                                                  ed,            



                                                  Routine           

 

     FENTanyl PF       No             25ug      25 mcg,           Un

[place]



     (SUBLIMAZE      8-10 08-10                          Slow IV           ity o

f



     (PF))      03:00: 03:26                          Push,           [place]



     injection      00   :00                           ONCE, 1           Medical



     25 mcg                                         dose, On           22           



                                                  at 2200,           



                                                  Routine           

 

     Sliding       No                       Subcutaneo           Uni

vers



     Scale      8-09 08-10                          us, TID           ity of



     Insulin -      22:00: 12:44                          MEALS+HS,           [facility]

[place]



     Lispro      00   :50                           First dose           Medical



     (HumaLOG) +                                         (after           



     Fsbg                                         last           



     Testing                                         modificati           



                                                  on) on 22 at           



                                                  1700,           



                                                  Until           



                                                  Discontinu           



                                                  ed,            



                                                  Routine           

 

     insulin NPH      2022- No             30U       30 Units,           

[facility]



     (HUMULIN N)      8-09 08-10                          Subcutaneo           i

ty of



     injection      22:00: 12:44                          us, QPM,           Eric

as



     30 Units      00   :50                           First dose           Medic

al



                                                  (after           [facility]



                                                  last           



                                                  modificati           



                                                  on) on 22 at           



                                                  1700,           



                                                  Until           



                                                  Discontinu           



                                                  ed,            



                                                  Routine           

 

     FENTanyl PF      2022- No             25ug      25 mcg,           Un

[place]



     (SUBLIMAZE                                Slow IV           ity o

f



     (PF))      17:54: 18:30                          Push,           [place]



     injection      44   :23                           Q5MIN PRN,           Medi

sarah



     25 mcg                                         4 doses,           [facility]



                                                  Starting           



                                                  on 22 at           



                                                  1254,           



                                                  Until 22 at           



                                                  1330,           



                                                  Routine,           



                                                  Pain           



                                                  (scale           



                                                  7-10),           



                                                  PACU           

 

     ketamine      2022- No                       Intravenou           Un

[place]



     (KETALAR)                                s, ONCE           ity of



     injection      17:26: 17:56                          INTRA           [place]



               00   :00                           PROCEDURE,           Medical



                                                  Starting           [facility]



                                                  on 22 at           



                                                  1226,           



                                                  Until 22 at           



                                                  1256,           



                                                  Routine,           



                                                  Intra-op           

 

     clindamycin      2022- No                       IV             Unive

rs



     in 5 %                                Piggyback,           ity of



     dextrose      17:18: 17:56                          ONCE INTRA           [facility]

[place]



     (CLEOCIN)      00   :00                           PROCEDURE,           Medi

sarah



     900 mg/50                                         Starting           [facility]



     mL IV                                         on Tue           



     piggyback                                         22 at           



     RTU                                          1218,           



                                                  Until 22 at           



                                                  1256,           



                                                  Administer           



                                                  over 30           



                                                  Minutes,           



                                                  Intra-op           

 

     bupivacaine      2022- No                       PRN,           Unive

rs



     (preserv                                Starting           ity of



     free) 0.5%      17:00: 17:54                          on Tue           [place]

[facility]



     (SENSORCAIN      00   :05                           22 at           Med

ical



     E MPF) 0.5                                         1200,           [facility]



     % (5 mg/mL)                                         Intra-op           



     10 mL,                                                        



     lidocaine                                                        



     1% (PF)                                                        



     (XYLOCAINE)                                                        



     10 mL                                                        

 

     phenylephri      2022- No                       Intravenou          

 [facility]



     ne                                  s, ONCE           ity of



     (VAZCULEP)      16:41: 17:56                          INTRA           [place]



     injection      00   :00                           PROCEDURE,           Medi

sarah



                                                  Starting           [facility]



                                                  on 22 at           



                                                  1141,           



                                                  Until 22 at           



                                                  1256,           



                                                  Routine,           



                                                  Intra-op           

 

     dexmedeTOMI      2022- No                       Intravenou          

 [facility]



     Dine                                s, ONCE           ity of



     (PRECEDEX)      16:31: 17:56                          INTRA           [place]



     injection      00   :00                           PROCEDURE,           Medi

sarah



                                                  Starting           [facility]



                                                  on 22 at           



                                                  1131,           



                                                  Until 22 at           



                                                  1256,           



                                                  Routine,           



                                                  Intra-op           

 

     propofoL IV      2022- No                       IV             Unive

rs



     infusion                                Infusion,           ity o

f



               16:31: 17:56                          CONTINUOUS           [place]



               00   :00                           PRN,           Medical



                                                  Starting           [facility]



                                                  on 22 at           



                                                  1131,           



                                                  Until 22 at           



                                                  1256,           



                                                  Routine,           



                                                  Intra-op           

 

     FENTanyl PF      2022- No                       Epidural,           

[facility]



     (SUBLIMAZE                                ONCE INTRA           it

y of



     (PF))      16:31: 17:56                          PROCEDURE,           [place]



     injection      00   :00                           Starting           Medica

l



                                                  on Tue           [facility]



                                                  22 at           



                                                  1131,           



                                                  Until 22 at           



                                                  1256,           



                                                  Routine,           



                                                  Intra-op           

 

     midazolam      2022- No                       IV Push,           Uni

vers



     (VERSED)                                ONCE INTRA           ity 

of



     injection      16:31: 17:56                          PROCEDURE,           [place]

[place]



               00   :00                           Starting           Medical



                                                  on Tue           [facility]



                                                  22 at           



                                                  1131,           



                                                  Until 22 at           



                                                  1256,           



                                                  Routine,           



                                                  Intra-op           

 

     lactated      2022- No                       IV             [facility]



     ringers IV                                Infusion,           ity

 of



     infusion      16:25: 17:56                          CONTINUOUS           [facility]

[place]



               00   :00                           PRN,           Medical



                                                  Starting           [facility]



                                                  on 22 at           



                                                  1125,           



                                                  Until 22 at           



                                                  1256,           



                                                  Routine,           



                                                  Intra-op           

 

     insulin NPH      2022- No             34U       34 Units,           

[facility]



     (HUMULIN N)      8-09 08-10                          Subcutaneo           i

ty of



     injection      14:00: 12:44                          us, QAM,           Eric

as



     34 Units      00   :50                           First dose           Medic

al



                                                  (after           [facility]



                                                  last           



                                                  modificati           



                                                  on) on 22 at           



                                                  0900,           



                                                  Until           



                                                  Discontinu           



                                                  ed,            



                                                  Routine           

 

     insulin      2022- No             14U       14 Units,           Univ

ers



     lispro      8-09 08-10                          Subcutaneo           ity of



     (human)      13:00: 12:44                          us, TID           [place]



     (HumaLOG      00   :50                           MEALS,           Medical



     U-100)                                         First dose           [facility]



     injection                                         (after           



     14 Units                                         last           



                                                  modificati           



                                                  on) on 22 at           



                                                  0800,           



                                                  Until           



                                                  Discontinu           



                                                  ed,            



                                                  Routine           

 

     insulin NPH      2022- No             32U       32 Units,           

[facility]



     (HUMULIN N)                                Subcutaneo           i

ty of



     injection      22:00: 12:53                          us, QPM,           Eric

as



     32 Units      00   :44                           First dose           Medic

al



                                                  (after           [facility]



                                                  last           



                                                  modificati           



                                                  on) on 22 at           



                                                  1700,           



                                                  Until           



                                                  Discontinu           



                                                  ed,            



                                                  Routine           

 

     HYDROmorpho      2022- No             4mg       4 mg,           Univ

ers



     ne        8-08 08-10                          Oral,           ity of



     (DILAUDID)      16:30: 14:59                          BIDPRN,           Eric

as



     tablet 4 mg      00   :38                           Starting           Medi

sarah



                                                  on 22 at           



                                                  1130,           



                                                  Until Wed           



                                                  8/10/22 at           



                                                  0959,           



                                                  Routine,           



                                                  Pain           



                                                  (scale           



                                                  7-10)           

 

     insulin NPH      2022- No             36U       36 Units,           

[facility]



     (HUMULIN N)                                Subcutaneo           i

ty of



     injection      14:00: 12:53                          us, QAM,           [facility]

as



     36 Units      00   :44                           First dose           Medic

al



                                                  (after           [facility]



                                                  last           



                                                  modificati           



                                                  on) on 22 at           



                                                  0900,           



                                                  Until           



                                                  Discontinu           



                                                  ed,            



                                                  Routine           

 

     insulin      2022- No             15U       15 Units,           Univ

ers



     lispro                                Subcutaneo           ity of



     (human)      13:30: 12:53                          us, TID           [place]



     (HumaLOG      00   :44                           MEALS,           Medical



     U-100)                                         First dose           [facility]



     injection                                         (after           



     15 Units                                         last           



                                                  modificati           



                                                  on) on 22 at           



                                                  0830,           



                                                  Until           



                                                  Discontinu           



                                                  ed,            



                                                  Routine           

 

     cholestyram            Yes                      Topical           Uni

vers



     ine-nystati                                     (Apply To           ity

 of



     n-zinc      13:00:                               Affected           [place]



     oxide      00                                 Areas),           Medical



     ointment                                         BID, First           Branc

h



     1:1:1                                         dose           



     (COMPOUNDED                                         (after           



     )                                            last           



                                                  modificati           



                                                  on) on 22 at           



                                                  0800,           



                                                  Until           



                                                  Discontinu           



                                                  ed,            



                                                  Routine           

 

     cholestyram            Yes                      Topical           Uni

vers



     ine-nystati                                     (Apply To           ity

 of



     n-zinc      13:00:                               Affected           [place]



     oxide      00                                 Areas),           Medical



     ointment                                         BID, First           [facility]

h



     1:1:1                                         dose           



     (COMPOUNDED                                         (after           



     )                                            last           



                                                  modificati           



                                                  on) on 22 at           



                                                  0800,           



                                                  Until           



                                                  Discontinu           



                                                  ed,            



                                                  Routine           

 

     magnesium      2022- No             4g        4 g, IV           Univ

ers



     sulfate in                                Piggyback,           it

y of



     water 4      12:00: 15:57                          ONCE, 1           [place]



     gram/50 mL      00   :00                           dose, On           Medic

al



     (8 %) IV                                         22           [facility]

h



     Piggyback 4                                         at 0700,           



     g                                            Routine           

 

     eravacyclin      2022- No             1mg/kg      123 mg (1         

  [facility]



     e (XERAVA)      -                          mg/kg ?123           it

y of



     123 mg in      03:15: 00:10                          kg), IV           [place]

s



     NaCl 0.9%      00   :03                           Infusion,           Medic

al



     (NS) 250 mL                                         Q12H ABX,           [facility]

nch



     IV infusion                                         Administer           



                                                  over 60           



                                                  Minutes,           



                                                  First dose           



                                                  on Sun           



                                                  8/7/22 at           



                                                  2215, For           



                                                  7 days           

 

     insulin NPH      2022- No             38U       38 Units,           

[facility]



     (HUMULIN N)                                Subcutaneo           i

ty of



     injection      22:00: 13:14                          us, QPM,           [facility]

as



     38 Units      00   :25                           First dose           Medic

al



                                                  (after           [facility]



                                                  last           



                                                  modificati           



                                                  on) on Sun           



                                                  8/7/22 at           



                                                  1700,           



                                                  Until           



                                                  Discontinu           



                                                  ed,            



                                                  Routine           

 

     insulin      2022- No             18U       18 Units,           Univ

ers



     lispro                                Subcutaneo           ity of



     (human)      17:00: 13:15                          us, TID           [place]



     (HumaLOG      00   :02                           MEALS,           Medical



     U-100)                                         First dose           [facility]



     injection                                         (after           



     18 Units                                         last           



                                                  modificati           



                                                  on) on Sun           



                                                  8/7/22 at           



                                                  1200,           



                                                  Until           



                                                  Discontinu           



                                                  ed,            



                                                  Routine           

 

     insulin NPH      2022- No             37U       37 Units,           

[facility]



     (HUMULIN N)                                Subcutaneo           i

ty of



     injection      14:00: 14:15                          us, QAM,           [facility]

as



     37 Units      00   :14                           First dose           Medic

al



                                                  (after           [facility]



                                                  last           



                                                  modificati           



                                                  on) on Sun           



                                                  22 at           



                                                  0900,           



                                                  Until           



                                                  Discontinu           



                                                  ed,            



                                                  Routine           

 

     Sliding                             Subcutaneo           Uni

vers



     Scale                                us, TID           ity of



     Insulin -      02:00: 12:53                          MEALS+HS,           [facility]

[place]



     Lispro      00   :44                           First dose           Medical



     (HumaLOG) +                                         (after           [facility]



     Fsbg                                         last           



     Testing                                         modificati           



                                                  on) on Sat           



                                                  22 at           



                                                  2100,           



                                                  Until           



                                                  Discontinu           



                                                  ed,            



                                                  Routine           

 

     HYDROmorpho      2022- No             2mg       2 mg,           Univ

ers



     ne                                  Oral,           ity of



     (DILAUDID)      00:17: 16:23                          Q8HPRN,           [facility]

as



     tablet 2 mg      00   :26                           Starting           Medi

sar[facility]



                                                  on Sat           [facility]



                                                  22 at           



                                                  1917,           



                                                  Until Mon           



                                                  22 at           



                                                  1123,           



                                                  Routine,           



                                                  Pain           



                                                  (scale           



                                                  7-10)           

 

     insulin      2022- No             17U       17 Units,           Univ

ers



     lispro                                Subcutaneo           ity of



     (human)      22:00: 14:15                          us, TID           [place]



     (HumaLOG      00   :14                           MEALS,           Medical



     U-100)                                         First dose           [facility]



     injection                                         (after           



     17 Units                                         last           



                                                  modificati           



                                                  on) on Sat           



                                                  22 at           



                                                  1700,           



                                                  Until           



                                                  Discontinu           



                                                  ed,            



                                                  Routine           

 

     insulin NPH       No             35U       35 Units,           

[facility]



     (HUMULIN N)                                Subcutaneo           i

ty of



     injection      22:00: 13:38                          us, QPM,           [facility]

as



     35 Units      00   :53                           First dose           Medic

al



                                                  (after           [facility]



                                                  last           



                                                  modificati           



                                                  on) on Sat           



                                                  22 at           



                                                  1700,           



                                                  Until           



                                                  Discontinu           



                                                  ed,            



                                                  Routine           

 

     Sliding                             Subcutaneo           Uni

vers



     Scale                                us, Q4H,           ity of



     Insulin -      21:00: 22:06                          First dose           [facility]

[place]



     Lispro      00   :35                           (after           Medical



     (HumaLOG) +                                         last           [facility]



     Fsbg                                         modificati           



     Testing                                         on) on Sat           



                                                  22 at           



                                                  1600,           



                                                  Until           



                                                  Discontinu           



                                                  ed,            



                                                  Routine           

 

     HYDROmorpho      2022- No             4mg       4 mg,           Univ

ers



     ne                                  Oral,           ity of



     (DILAUDID)      17:24: 00:17                          Q8HPRN,           [facility]

as



     tablet 4 mg      27   :12                           Starting           Medi

[facility]



                                                  on Sat           [facility]



                                                  22 at           



                                                  1224,           



                                                  Until Sat           



                                                  22 at           



                                                  1917,           



                                                  Routine,           



                                                  Pain           



                                                  (scale           



                                                  7-10)           

 

     sodium            Yes                      Topical,           [facility]



     hypochlorit      8-06                               DAILY,           ity of



     e 0.25%      14:00:                               First dose           [place]

[facility]



     (DAKIN'S      00                                 on Sat           Medical



     SOLUTION)                                         22 at           [facility]

h



     solution                                         0900,           



                                                  Until           



                                                  Discontinu           



                                                  ed, ASAP           

 

     sodium            Yes                      Topical,           [facility]



     hypochlorit      8-                               DAILY,           ity of



     e 0.25%      14:00:                               First dose           [place]

[facility]



     (DAKIN'S      00                                 on Sat           Medical



     SOLUTION)                                         22 at           [facility]

h



     solution                                         0900,           



                                                  Until           



                                                  Discontinu           



                                                  ed, ASAP           

 

     insulin      2022- No             12U       12 Units,           Univ

ers



     lispro                                Subcutaneo           ity of



     (human)      14:00: 19:55                          us, TIDPC,           [facility]

as



     (HumaLOG      00   :31                           First dose           Medic

al



     U-100)                                         (after           [facility]



     injection                                         last           



     12 Units                                         modificati           



                                                  on) on Sat           



                                                  22 at           



                                                  0900,           



                                                  Until           



                                                  Discontinu           



                                                  ed,            



                                                  Routine           

 

     insulin NPH       No             26U       26 Units,           

[facility]



     (HUMULIN N)                                Subcutaneo           i

ty of



     injection      14:00: 19:55                          us, QAM,           [facility]

as



     26 Units      00   :31                           First dose           Medic

al



                                                  (after           [facility]



                                                  last           



                                                  modificati           



                                                  on) on Sat           



                                                  22 at           



                                                  0900,           



                                                  Until           



                                                  Discontinu           



                                                  ed,            



                                                  Routine           

 

     Sliding       No                       Subcutaneo           Uni

vers



     Scale                                us, TID           ity of



     Insulin -      13:00: 19:55                          MEALS+HS,           [facility]

[facility]



     Lispro      00   :31                           First dose           Medical



     (HumaLOG) +                                         on Sat           [facility]



     Fsbg                                         22 at           



     Testing                                         0800,           



                                                  Until           



                                                  Discontinu           



                                                  ed,            



                                                  Routine           

 

     glucagon            Yes            1mg       1 mg,           [facility]



     (GLUCAGEN                                     Intramuscu           ity 

of



     DIAGNOSTIC      12:22:                               lar, PRN,           Te

[facility]



     KIT)      35                                 Starting           Medical



     injection 1                                         on Sat           [facility]



     mg                                           22 at           



                                                  0722,           



                                                  Until           



                                                  Discontinu           



                                                  ed, ASAP,           



                                                  Blood           



                                                  Glucose           



                                                  &amp;lt;           



                                                  or = 70           



                                                  mg/dL and           



                                                  patient is           



                                                  unable to           



                                                  swallow or           



                                                  has mental           



                                                  changes.           

 

     dextrose 50      0      Yes            25mL      25 mL,           Univ

ers



     % in water                                     Slow IV           ity of



     (D50W)      12:22:                               Push, PRN,           [place]



     injection      35                                 Starting           Medica

l



     25 mL                                         on Sat           [facility]



                                                  22 at           



                                                  0722,           



                                                  Until           



                                                  Discontinu           



                                                  ed, ASAP,           



                                                  Blood           



                                                  Glucose           



                                                  &amp;lt;           



                                                  or = 70           



                                                  mg/dL and           



                                                  patient is           



                                                  unable to           



                                                  swallow or           



                                                  has mental           



                                                  status           



                                                  changes.           

 

     glucagon            Yes            1mg       1 mg,           [facility]



     (GLUCAGEN                                     Intramuscu           ity 

of



     DIAGNOSTIC      12:22:                               lar, PRN,           [facility]

[facility]



     KIT)      35                                 Starting           Medical



     injection 1                                         on Sat           [facility]



     mg                                           22 at           



                                                  0722,           



                                                  Until           



                                                  Discontinu           



                                                  ed, ASAP,           



                                                  Blood           



                                                  Glucose           



                                                  &amp;lt;           



                                                  or = 70           



                                                  mg/dL and           



                                                  patient is           



                                                  unable to           



                                                  swallow or           



                                                  has mental           



                                                  changes.           

 

     dextrose 50      0      Yes            25mL      25 mL,           Univ

ers



     % in water                                     Slow IV           ity of



     (D50W)      12:22:                               Push, PRN,           [place]



     injection      35                                 Starting           Medica

l



     25 mL                                         on Sat           [facility]



                                                  22 at           



                                                  0722,           



                                                  Until           



                                                  Discontinu           



                                                  ed, ASAP,           



                                                  Blood           



                                                  Glucose           



                                                  &amp;lt;           



                                                  or = 70           



                                                  mg/dL and           



                                                  patient is           



                                                  unable to           



                                                  swallow or           



                                                  has mental           



                                                  status           



                                                  changes.           

 

     HYDROmorpho      2022- No             .2mg      0.2 mg,           Un

[facility]



     ne        -06                          Slow IV           ity of



     (DILAUDID)      01:49: 02:17                          Push, PRN,           

[place]



     injection      34   :00                           1 dose,           Medical



     0.2 mg                                         Starting           [facility]



                                                  on 22 at           



                                                  ,           



                                                  Until 22 at           



                                                  ,           



                                                  Routine,           



                                                  Pain           



                                                  (scale           



                                                  7-10)<br>U           



                                                  se             



                                                  approved           



                                                  by             



                                                  (Faculty):           



                                                  [facility]           



                                                  CARE UNIT           

 

     KCL 20      -0 - No             40meq      40 mEq,           Univer

s



     mEq/15 mL      8-05 08-05                          Oral,           ity of



     solution 40      23:30: 22:58                          ONCE, 1           [facility]

[facility]



     mEq       00   :00                           dose, On           Medical



                                                  22           [facility]



                                                  at 1830,           



                                                  Routine           

 

     Sliding                             Subcutaneo           Uni

vers



     Scale                                us, Q4H,           ity of



     Insulin -      22:15: 12:23                          First dose           [facility]

[place]



     Lispro      00   :48                           on Fri           Medical



     (HumaLOG) +                                         22 at           [facility]



     Fsbg                                         1715,           



     Testing                                         Until           



                                                  Discontinu           



                                                  ed,            



                                                  Routine           

 

     insulin NPH      2022- No             22U       22 Units,           

[facility]



     (HUMULIN N)                                Subcutaneo           i

ty of



     injection      22:00: 19:55                          us, QPM,           Eric

as



     22 Units      00   :31                           First dose           Medic

al



                                                  (after           Branch



                                                  last           



                                                  modificati           



                                                  on) on 22 at           



                                                  1700,           



                                                  Until           



                                                  Discontinu           



                                                  ed,            



                                                  Routine           

 

     magnesium       No             4g        4 g, IV           Univ

ers



     sulfate in                                Piggyback,           it

y of



     water 4      19:15: 20:22                          ONCE, 1           [place]



     gram/50 mL      00   :00                           dose, On           Medic

al



     (8 %) IV                                         22           Branc

h



     Piggyback 4                                         at 1415,           



     g                                            Routine           

 

     HYDROmorpho       No             .2mg      0.2 mg,           Un

[facility]



     ne                                  Slow IV           ity of



     (DILAUDID)      18:00: 18:07                          Push,           [place]



     injection      00   :00                           ONCE, 1           Medical



     0.2 mg                                         dose, On           Branch



                                                  22           



                                                  at 1300,           



                                                  Routine<br           



                                                  >Use           



                                                  approved           



                                                  by             



                                                  (Faculty):           



                                                  [facility]           



                                                  CARE UNIT           

 

     insulin      2022- No             6U        6 Units,           Unive

rs



     lispro                                Subcutaneo           ity of



     (human)      14:00: 00:41                          us, TIDPC,           Eric

as



     (HumaLOG      00   :23                           First dose           Medic

al



     U-100)                                         (after           Branch



     injection 6                                         last           



     Units                                         modificati           



                                                  on) on 22 at           



                                                  0900,           



                                                  Until           



                                                  Discontinu           



                                                  ed,            



                                                  Routine           

 

     insulin NPH      2022- No             22U       22 Units,           

[facility]



     (HUMULIN N)                                Subcutaneo           i

ty of



     injection      14:00: 21:37                          us, QAM,           Eric

as



     22 Units      00   :09                           First dose           Medic

al



                                                  on Fri           Branch



                                                  22 at           



                                                  0900,           



                                                  Until           



                                                  Discontinu           



                                                  ed,            



                                                  Routine           

 

     HYDROmorpho      2022- No             .2mg      0.2 mg,           Un

[facility]



     ne                                  Slow IV           ity of



     (DILAUDID)      08:15: 07:57                          Push,           [place]



     injection      00   :00                           ONCE, 1           Medical



     0.2 mg                                         dose, On           Branch



                                                  Fri 22           



                                                  at 0315,           



                                                  Routine<br           



                                                  >Use           



                                                  approved           



                                                  by             



                                                  (Faculty):           



                                                  INTENS[facility]           



                                                  CARE UNIT           

 

     HYDROcodone      2022- No             1{tbl}      1 tablet,         

  [facility]



     -acetaminop                                Oral,           ity of



     hen (NORCO)      01:32: 17:25                          Q6HPRN,           Te

[facility]



      mg      23   :31                           Starting           Medica

l



     tablet 1                                         on Thu           Branch



     tablet                                         22 at           



                                                  2032,           



                                                  Until Sat           



                                                  22 at           



                                                  1225,           



                                                  Routine,           



                                                  Pain           



                                                  (scale           



                                                  7-10)           

 

     Sliding      2022- No                       Subcutaneo           Uni

vers



     Scale      05                          us, Q4H,           ity of



     Insulin -      01:00: 21:37                          First dose           T

[facility]



     lispro      00   :09                           on u           Medical



     (humaLOG) +                                         22 at           [facility]



     Fsbg                                         ,           



     Testing                                         Until           



                                                  Discontinu           



                                                  ed,            



                                                  Routine           

 

     meropenem      2022- No             1000mg      1,000 mg,           

[facility]



     (MERREM)                                IV             ity of



     1,000 mg in      00:15: 02:14                          [place], [place]



     NaCl 0.9%      00   :44                           Q8H ABX,           Medica

l



     (NS) 50 mL                                         21 doses,           [facility]

ch



     MINI-BAG                                         First dose           



                                                  on Thu           



                                                  22 at           



                                                  1915, Last           



                                                  dose on           



                                                  u            



                                                  22 at           



                                                  1115,           



                                                  Administer           



                                                  over 3           



                                                  Hours, 50           



                                                  mL<br>Rest           



                                                  ricted use           



                                                  approved           



                                                  by: [facility]           



                                                  8TH            



                                                  FLOOR<br>R           



                                                  [place] for           



                                                  Anti-Infec           



                                                  tive:           



                                                  Documented           



                                                  Infection<           



                                                  br>Documen           



                                                  huma            



                                                  Infection           



                                                  Site:           



                                                  Urine<br>D           



                                                  uration of           



                                                  Therapy: 7           



                                                  days           

 

     HYDROmorpho      2022- No             .2mg      0.2 mg,           Un

[place]



     ne                                  Slow IV           ity of



     (DILAUDID)      00:00: 23:13                          Push,           [place]



     injection      00   :00                           ONCE, 1           Medical



     0.2 mg                                         dose, On           Branch



                                                  Thu 22           



                                                  at 1900,           



                                                  Routine<br           



                                                  >Use           



                                                  approved           



                                                  by             



                                                  (Faculty):           



                                                  INTENSIVE           



                                                  CARE UNIT           

 

     HYDROmorpho      2022- No             .2mg      0.2 mg,           Un

[place]



     ne                                  Slow IV           ity of



     (DILAUDID)      19:30: 18:59                          Push,           [place]



     injection      00   :00                           ONCE, 1           Medical



     0.2 mg                                         dose, On           Branch



                                                  u 22           



                                                  at 1430,           



                                                  Routine<br           



                                                  >Use           



                                                  approved           



                                                  by             



                                                  (Faculty):           



                                                  INTENSIVE           



                                                  CARE UNIT           

 

     cholestyram      2022- No                       Topical           Un

y[place]elyn



     ine-nystati                                (Apply To           it

y of



     n-zinc      18:29: 12:46                          Affected           [place]



     oxide      12   :35                           Areas),           Medical



     ointment                                         PRN,           Branch



     1:1:1                                         Starting           



     (COMPOUNDED                                         on            



     )                                            22 at           



                                                  1329,           



                                                  Until Mon           



                                                  22 at           



                                                  0746,           



                                                  Routine,           



                                                  Wound           



                                                  care,           



                                                  Cuts,           



                                                  abrasions,           



                                                  and skin           



                                                  ulcers           

 

     iopamidol      2022- No        [id number] 60mL      60 mL,           

[facility]



     (ISOVUE                                Intravenou           ity o

f



     370-500 mL)      17:25: 05:25                          s, ONCE, 1          

 [place]



     injection      00   :00                           dose, On           Medica

l



     60 mL                                         Thu 22           Branch



                                                  at 1230,           



                                                  Routine           

 

     meropenem      2022- No             1000mg      1,000 mg,           

[facility]



     (MERREM)                                IV             ity of



     1,000 mg in      16:45: 20:14                          Piggyback,          

 [place]



     NaCl 0.9%      00   :00                           ONCE, 1           Medical



     (NS) 50 mL                                         dose, On           Branc

h



     MINI-BAG                                         Thu 22           



                                                  at 1145,           



                                                  Administer           



                                                  over 30           



                                                  Minutes,           



                                                  50             



                                                  mL<br&gt;R           



                                                  estricted           



                                                  use            



                                                  approved           



                                                  by: [facility]           



                                                  8TH            



                                                  FLOOR<br>R           



                                                  [place] for           



                                                  Anti-Infec           



                                                  tive:           



                                                  Documented           



                                                  Infection<           



                                                  br>Documen           



                                                  huma            



                                                  Infection           



                                                  Site:           



                                                  Urine<br>D           



                                                  uration of           



                                                  Therapy: 7           



                                                  days           

 

     pantoprazol      2022-0      Yes            40mg      40 mg,           Univ

ers



     e         8                               Oral,           ity of



     (PROTONIX)      14:00:                               DAILY,           [place]



     EC tablet      00                                 First dose           Medi

sar[place]



     40 mg                                         on Thu           Branch



                                                  22 at           



                                                  0900,           



                                                  Until           



                                                  Discontinu           



                                                  ed,            



                                                  Routine<br           



                                                  >Indicatio           



                                                  n for use:           



                                                  None of           



                                                  the above           

 

     pantoprazol      0      Yes            40mg      40 mg,           Univ

ers



     e                                        Oral,           ity of



     (PROTONIX)      14:00:                               DAILY,           [place]



     EC tablet      00                                 First dose           Medi

sarah



     40 mg                                         on Thu           Branch



                                                  22 at           



                                                  0900,           



                                                  Until           



                                                  Discontinu           



                                                  ed,            



                                                  Routine<br           



                                                  >Indicatio           



                                                  n for use:           



                                                  None of           



                                                  the above           

 

     heparin            Yes            5000U      5,000           [facility]



     (porcine)                                     Units,           ity of



     injection      13:00:                               Subcutaneo           Te

[place]



     5,000 Units      00                                 us, Q12H,           Med

ical



                                                  First dose           Branch



                                                  on Thu           



                                                  22 at           



                                                  0800,           



                                                  Until           



                                                  Discontinu           



                                                  ed,            



                                                  Routine           

 

     heparin       No             5000U      5,000           [facility]



     (porcine)       08-10                          Units,           ity of



     injection      13:00: 21:57                          Subcutaneo           T

[place]



     5,000 Units      00   :33                           us, Q12H,           Med

ical



                                                  First dose           Branch



                                                  on Thu           



                                                  22 at           



                                                  0800,           



                                                  Until           



                                                  Discontinu           



                                                  ed,            



                                                  Routine           

 

     NaCl 0.9%            Yes            10mL      10 mL,           Univer

s



     (NS)      804                               Slow IV           ity of



     injection      11:28:                               Push, PRN,           Te

[place]



     10 mL      11                                 Starting           Medical



                                                  on Thu           Branch



                                                  22 at           



                                                  0628,           



                                                  Until           



                                                  Discontinu           



                                                  ed,            



                                                  Routine,           



                                                  line           



                                                  maintenanc           



                                                  e              

 

     lidocaine            Yes            5mL       5 mL,           [facility]



     1% (PF)                                     Subcutaneo           ity of



     (XYLOCAINE)      11:28:                               us, PRN,           Te

[place]



     injection 5      11                                 Starting           Medi

sarah



     mL                                           on Thu           Branch



                                                  22 at           



                                                  0628,           



                                                  Until           



                                                  Discontinu           



                                                  ed,            



                                                  Routine,           



                                                  Local           



                                                  anesthesia           

 

     NaCl 0.9%      0      Yes            10mL      10 mL,           Univer

s



     (NS)      804                               Slow IV           ity of



     injection      11:28:                               Push, PRN,           Te

[place]



     10 mL      11                                 Starting           Medical



                                                  on Thu           Branch



                                                  22 at           



                                                  0628,           



                                                  Until           



                                                  Discontinu           



                                                  ed,            



                                                  Routine,           



                                                  line           



                                                  maintenanc           



                                                  e              

 

     lidocaine            Yes            5mL       5 mL,           [facility]



     1% (PF)                                     Subcutaneo           ity of



     (XYLOCAINE)      11:28:                               us, PRN,           [facility]

[place]



     injection 5      11                                 Starting           Medi

sarah



     mL                                           on Thu           Branch



                                                  22 at           



                                                  0628,           



                                                  Until           



                                                  Discontinu           



                                                  ed,            



                                                  Routine,           



                                                  Local           



                                                  anesthesia           

 

     NaCl 0.9%      2022- No             1000mL      at 999           Uni

vers



     (NS) bolus      -04                          mL/hr,           ity of



     infusion      07:30: 06:55                          1,000 mL,           Eric

as



     1,000 mL      00   :00                           IV             Medical



                                                  Infusion,           Branch



                                                  ONCE, 1           



                                                  dose, On           



                                                  u 22           



                                                  at 0230,           



                                                  ASAP           

 

     FENTanyl PF      2022- No             50ug      50 mcg,           Un

yoselyn



     (SUBLIMAZE                                Slow IV           ity o

f



     (PF))      06:45: 06:01                          [facility],           [place]



     injection      00   :00                           ONCE, 1           Medical



     50 mcg                                         dose, On           Branch



                                                  u 22           



                                                  at 0145,           



                                                  Routine           

 

     cefTRIAXone      2022- No             1000mg      1,000 mg,         

  [facility]



     (ROCEPHIN)                                IV             ity of



     1,000 mg in      05:30: 06:00                          [place], [place]



     NaCl 0.9%      00   :00                           ONCE, 1           Medical



     (NS) 50 mL                                         dose, On           [facility]

h



     MINI-BAG                                         u 22           



                                                  at 0030,           



                                                  Administer           



                                                  over 30           



                                                  Minutes,           



                                                  50             



                                                  mL<br&gt;R           



                                                  elmira for           



                                                  Anti-Infec           



                                                  tive:           



                                                  Empiric           



                                                  Therapy           



                                                  for            



                                                  Suspected           



                                                  Infection<           



                                                  br>Empiric           



                                                  Therapy           



                                                  Site:           



                                                  Urine<br>D           



                                                  uration of           



                                                  therapy:           



                                                  72 hours           

 

     NaCl 0.9%      2022- No             1000mL      at 999           Uni

vers



     (NS) bolus       08-04                          mL/hr,           ity of



     infusion      05:30: 07:58                          1,000 mL,           Eric

as



     1,000 mL      00   :00                           IV             Medical



                                                  Infusion,           Branch



                                                  ONCE, 1           



                                                  dose, On           



                                                  u 22           



                                                  at 0030,           



                                                  ASAP           

 

     NaCl 0.9%      2022- No             1000mL      at 999           Uni

vers



     (NS) bolus       08-04                          mL/hr,           ity of



     infusion      05:15: 07:58                          1,000 mL,           Eric

as



     1,000 mL      00   :00                           IV             Medical



                                                  Infusion,           Branch



                                                  ONCE, 1           



                                                  dose, On           



                                                  Thu 22           



                                                  at 0015,           



                                                  ASAP           

 

     D5W 0.45%      2022- No                       IV             [facility]



     NaCl                                Infusion,           ity of



     (1/2NS) 1 L      04:53: 22:22                          at 200           Eric

as



     + KCL 20      49   :19                           mL/hr, PRN           Medic

al



     mEq                                          - SEE           Branch



                                                  INSTRUCTIO           



                                                  NS,            



                                                  Starting           



                                                  on Wed           



                                                  8/3/22 at           



                                                  2353,           



                                                  Until 22 at           



                                                  1722,           



                                                  ASAP,           



                                                  Blood           



                                                  glucose           



                                                  control           

 

     proMETHazin      2022- No             25mg      25 mg, IV           

[facility]



     e                                   Piggyback,           ity of



     (PHENERGAN)      04:15: 04:22                          ONCE, 1           Te

[place]



     25 mg in      00   :00                           dose, On           Medical



     NaCl 0.9%                                         Wed 8/3/22           [facility]

ch



     (NS) 50 mL                                         at 2315,           



     IV                                           ASAP           



     piggyback                                                        

 

     proMETHazin      2022- No             12.5mg      12.5 mg,          

 [facility]



     e          0730                          IV             ity of



     (PHENERGAN)      21:30: 22:14                          Piggyback,          

 [place]



     12.5 mg in      00   :00                           ONCE, 1           Medica

l



     NaCl 0.9%                                         dose, On           Branch



     (NS) 50 mL                                         Sat            



     IV                                           22 at           



     piggyback                                         1630, 50           



                                                  mL             

 

     lactated            Yes            1000mL      at 50           Univer

s



     ringers IV      7-30                               mL/hr,           ity of



     infusion      19:15:                               1,000 mL,           [place]

[place]



     1,000 mL      00                                 IV             Medical



                                                  Infusion,           Branch



                                                  CONTINUOUS           



                                                  , Starting           



                                                  on Sat           



                                                  22 at           



                                                  1415,           



                                                  Until           



                                                  Discontinu           



                                                  ed,            



                                                  Routine           

 

     insulin            Yes            7U        7 Units,           Univer

s



     lispro      7-30                               Subcutaneo           ity of



     (human)      17:00:                               us, TID           [place]



     (HumaLOG      00                                 MEALS,           Medical



     U-100)                                         First dose           Branch



     injection 7                                         (after           



     Units                                         last           



                                                  modificati           



                                                  on) on Sat           



                                                  22 at           



                                                  1200,           



                                                  Until           



                                                  Discontinu           



                                                  ed,            



                                                  Routine           

 

     insulin            Yes            40U       40 Units,           Unive

rs



     glargine      7-30                               Subcutaneo           ity o

f



     (LANTUS      14:00:                               us, DAILY,           [place]

[place]



     U-100)      00                                 First dose           Medical



     injection                                         (after           Branch



     40 Units                                         last           



                                                  modificati           



                                                  on) on Sat           



                                                  22 at           



                                                  0900,           



                                                  Until           



                                                  Discontinu           



                                                  ed,            



                                                  Routine           

 

     insulin NPH      2022- No        [id number] 50U       inject 50      

     [facility]



     and regular      7-30 08-30                          Units           ity of



     human 70-30      00:00: 04:59                          under the           

[place]



     100 unit/mL      00   :00                           skin every           Me

dical



     (70-30)                                         morning           Branch



     injection                                         and            



                                                  evening           



                                                  for 30           



                                                  days.           

 

     proMETHazin      2022- No        [id number] 12.5mg      Insert 1     

      [facility]



     e 12.5 mg      7-30 08-30                          Suppositor           ity

 of



     suppository      00:00: 04:59                          y into           Eric

as



               00   :00                           rectum           Medical



                                                  every 6           Branch



                                                  (six)           



                                                  hours as           



                                                  needed for           



                                                  Nausea and           



                                                  Vomiting           



                                                  (N/V) for           



                                                  up to 30           



                                                  days.           

 

     proMETHazin      2022- No        [id number] 12.5mg      Take 0.5     

      [facility]



     e 25 mg      7-30 08-30                          tablets by           ity o

f



     tablet      00:00: 04:59                          mouth           [place]



               00   :00                           every 4           Medical



                                                  (four)           Branch



                                                  hours as           



                                                  needed for           



                                                  Nausea and           



                                                  Vomiting           



                                                  (N/V) for           



                                                  up to 30           



                                                  days.           

 

     insulin NPH      2022- No        [id number] 50U       inject 50      

     [facility]



     and regular      7-30 08-30                          Units           ity of



     human 70-30      00:00: 04:59                          under the           

[place]



     100 unit/mL      00   :00                           skin every           Me

dical



     (70-30)                                         morning           Branch



     injection                                         and            



                                                  evening           



                                                  for 30           



                                                  days.           

 

     proMETHazin      2022- No        [id number] 12.5mg      Insert 1     

      [facility]



     e 12.5 mg      7-30 08-30                          Suppositor           ity

 of



     suppository      00:00: 04:59                          y into           Eric

as



               00   :00                           rectum           Medical



                                                  every 6           Branch



                                                  (six)           



                                                  hours as           



                                                  needed for           



                                                  Nausea and           



                                                  Vomiting           



                                                  (N/V) for           



                                                  up to 30           



                                                  days.           

 

     proMETHazin      2022- No        [id number] 12.5mg      Take 0.5     

      [facility]



     e 25 mg      7-30 08-30                          tablets by           ity o

f



     tablet      00:00: 04:59                          mouth           [place]



               00   :00                           every 4           Medical



                                                  (four)           Branch



                                                  hours as           



                                                  needed for           



                                                  Nausea and           



                                                  Vomiting           



                                                  (N/V) for           



                                                  up to 30           



                                                  days.           

 

     insulin NPH      2022- No        [id number] 50U       inject 50      

     [facility]



     and regular      7-30 08-30                          Units           ity of



     human 70-30      00:00: 04:59                          under the           

[place]



     100 unit/mL      00   :00                           skin every           Me

dical



     (70-30)                                         morning           Branch



     injection                                         and            



                                                  evening           



                                                  for 30           



                                                  days.           

 

     proMETHazin      2022- No        [id number] 12.5mg      Insert 1     

      [facility]



     e 12.5 mg      7-30 08-30                          Suppositor           ity

 of



     suppository      00:00: 04:59                          y into           Eric

as



               00   :00                           rectum           Medical



                                                  every 6           Branch



                                                  (six)           



                                                  hours as           



                                                  needed for           



                                                  Nausea and           



                                                  Vomiting           



                                                  (N/V) for           



                                                  up to 30           



                                                  days.           

 

     proMETHazin      2022- No        [id number] 12.5mg      Take 0.5     

      [facility]



     e 25 mg      7-30 08-30                          tablets by           ity o

f



     tablet      00:00: 04:59                          mouth           [place]



               00   :00                           every 4           Medical



                                                  (four)           Branch



                                                  hours as           



                                                  needed for           



                                                  Nausea and           



                                                  Vomiting           



                                                  (N/V) for           



                                                  up to 30           



                                                  days.           

 

     insulin NPH      2022- No        [id number] 50U       inject 50      

     [facility]



     and regular      7-30 08-30                          Units           ity of



     human 70-30      00:00: 04:59                          under the           

[place]



     100 unit/mL      00   :00                           skin every           Me

dical



     (70-30)                                         morning           Branch



     injection                                         and            



                                                  evening           



                                                  for 30           



                                                  days.           

 

     proMETHazin      2022- No        [id number] 12.5mg      Insert 1     

      [facility]



     e 12.5 mg      7-30 08-30                          Suppositor           ity

 of



     suppository      00:00: 04:59                          y into           Eric

as



               00   :00                           rectum           Medical



                                                  every 6           Branch



                                                  (six)           



                                                  hours as           



                                                  needed for           



                                                  Nausea and           



                                                  Vomiting           



                                                  (N/V) for           



                                                  up to 30           



                                                  days.           

 

     proMETHazin      2022- No        [id number] 12.5mg      Take 0.5     

      [facility]



     e 25 mg      7-30 08-30                          tablets by           ity o

f



     tablet      00:00: 04:59                          mouth           [place]



               00   :00                           every 4           Medical



                                                  (four)           Branch



                                                  hours as           



                                                  needed for           



                                                  Nausea and           



                                                  Vomiting           



                                                  (N/V) for           



                                                  up to 30           



                                                  days.           

 

     insulin NPH      2022- No        [id number] 50U       inject 50      

     [facility]



     and regular      7-30 08-30                          Units           ity of



     human 70-30      00:00: 04:59                          under the           

[place]



     100 unit/mL      00   :00                           skin every           Me

dical



     (70-30)                                         morning           Branch



     injection                                         and            



                                                  evening           



                                                  for 30           



                                                  days.           

 

     proMETHazin      2022- No        [id number] 12.5mg      Insert 1     

      [facility]



     e 12.5 mg      7-30 08-30                          Suppositor           ity

 of



     suppository      00:00: 04:59                          y into           Eric

as



               00   :00                           rectum           Medical



                                                  every 6           Branch



                                                  (six)           



                                                  hours as           



                                                  needed for           



                                                  Nausea and           



                                                  Vomiting           



                                                  (N/V) for           



                                                  up to 30           



                                                  days.           

 

     proMETHazin      2022- No        [id number] 12.5mg      Take 0.5     

      [facility]



     e 25 mg      7-30 08-30                          tablets by           ity o

f



     tablet      00:00: 04:59                          [facility]           [place]



               00   :00                           every 4           Medical



                                                  (four)           Branch



                                                  hours as           



                                                  needed for           



                                                  Nausea and           



                                                  Vomiting           



                                                  (N/V) for           



                                                  up to 30           



                                                  days.           

 

     proMETHazin      2022- No        [id number] 12.5mg      Insert 1     

      [facility]



     e 12.5 mg      7-30 08-30                          Suppositor           ity

 of



     suppository      00:00: 04:59                          y into           [facility]

as



               00   :00                           rectum           Medical



                                                  every 6           Branch



                                                  (six)           



                                                  hours as           



                                                  needed for           



                                                  Nausea and           



                                                  Vomiting           



                                                  (N/V) for           



                                                  up to 30           



                                                  days.           

 

     proMETHazin      2022- No        [id number] 12.5mg      Take 0.5     

      [facility]



     e 25 mg      7-30 08-30                          tablets by           ity o

f



     tablet      00:00: 04:59                          [facility]           [place]



               00   :00                           every 4           Medical



                                                  (four)           Branch



                                                  hours as           



                                                  needed for           



                                                  Nausea and           



                                                  Vomiting           



                                                  (N/V) for           



                                                  up to 30           



                                                  days.           

 

     proMETHazin      2022- No        [id number] 12.5mg      Insert 1     

      [facility]



     e 12.5 mg      7-30 08-30                          Suppositor           ity

 of



     suppository      00:00: 04:59                          y into           [facility]

as



               00   :00                           rectum           Medical



                                                  every 6           Branch



                                                  (six)           



                                                  hours as           



                                                  needed for           



                                                  Nausea and           



                                                  Vomiting           



                                                  (N/V) for           



                                                  up to 30           



                                                  days.           

 

     proMETHazin      2022- No        [id number] 12.5mg      Take 0.5     

      [facility]



     e 25 mg      7-30 08-30                          tablets by           ity o

f



     tablet      00:00: 04:59                          [facility]           [place]



               00   :00                           every 4           Medical



                                                  (four)           Branch



                                                  hours as           



                                                  needed for           



                                                  Nausea and           



                                                  Vomiting           



                                                  (N/V) for           



                                                  up to 30           



                                                  days.           

 

     proMETHazin      2022- No        [id number] 12.5mg      Insert 1     

      [facility]



     e 12.5 mg      7-30 08-30                          Suppositor           ity

 of



     suppository      00:00: 04:59                          y into           [facility]

as



               00   :00                           rectum           Medical



                                                  every 6           Branch



                                                  (six)           



                                                  hours as           



                                                  needed for           



                                                  Nausea and           



                                                  Vomiting           



                                                  (N/V) for           



                                                  up to 30           



                                                  days.           

 

     proMETHazin      2022- No        [id number] 12.5mg      Take 0.5     

      [facility]



     e 25 mg      7-30 08-30                          tablets by           ity o

f



     tablet      00:00: 04:59                          [facility]           [place]



               00   :00                           every 4           Medical



                                                  (four)           Branch



                                                  hours as           



                                                  needed for           



                                                  Nausea and           



                                                  Vomiting           



                                                  (N/V) for           



                                                  up to 30           



                                                  days.           

 

     proMETHazin      2022- No        [id number] 12.5mg      Insert 1     

      [facility]



     e 12.5 mg      7-30 08-30                          Suppositor           ity

 of



     suppository      00:00: 04:59                          y into           [facility]

as



               00   :00                           rectum           Medical



                                                  every 6           Branch



                                                  (six)           



                                                  hours as           



                                                  needed for           



                                                  Nausea and           



                                                  Vomiting           



                                                  (N/V) for           



                                                  up to 30           



                                                  days.           

 

     proMETHazin      2022- No        [id number] 12.5mg      Take 0.5     

      [facility]



     e 25 mg      7-30 08-30                          tablets by           ity o

f



     tablet      00:00: 04:59                          [facility]           [place]



               00   :00                           every 4           Medical



                                                  (four)           Branch



                                                  hours as           



                                                  needed for           



                                                  Nausea and           



                                                  Vomiting           



                                                  (N/V) for           



                                                  up to 30           



                                                  days.           

 

     insulin NPH      2022- No        [id number] 50U       inject 50      

     [facility]



     and regular      7-30 08-12                          Units           ity of



     human 70-30      00:00: 00:00                          under the           

[place]



     100 unit/mL      00   :00                           skin every           Me

dical



     (70-30)                                         morning           Branch



     injection                                         and            



                                                  evening           



                                                  for 30           



                                                  days.           

 

     insulin      2022- No             5U        5 Units,           Unive

rs



     lispro       07-30                          Subcutaneo           ity of



     (human)      17:00: 14:45                          us, TID           [place]



     (HumaLOG      00   :04                           MEALS,           Medical



     U-100)                                         First dose           Branch



     injection 5                                         (after           



     Units                                         last           



                                                  modificati           



                                                  on) on 22 at           



                                                  1200,           



                                                  Until           



                                                  Discontinu           



                                                  ed,            



                                                  Routine           

 

     HYDROmorphO      2022- No             1mg       1 mg,           Univ

ers



     ne                                  Intravenou           ity of



     (DILAUDID)      16:54: 16:53                          s, Q6HPRN,           

[place]



     injection 1      28   :28                           Starting           Medi

[facility]



     mg                                           on 22 at           



                                                  1154,           



                                                  Until Sun           



                                                  22 at           



                                                  1153,           



                                                  Routine,           



                                                  Break[place]           



                                                  gh pain           



                                                  only<br>Us           



                                                  e approved           



                                                  by             



                                                  (Faculty):           



                                                  ADC            



                                                  PROVIDER           

 

     insulin      2022- No             35U       35 Units,           Univ

ers



     glargine                                Subcutaneo           ity 

of



     (LANTUS      14:00: 14:02                          us, DAILY,           [facility]

as



     U-100)      00   :21                           First dose           Medical



     injection                                         (after           Branch



     35 Units                                         last           



                                                  modificati           



                                                  on) on 22 at           



                                                  0900,           



                                                  Until           



                                                  Discontinu           



                                                  ed,            



                                                  Routine           

 

     NaCl 0.9%            Yes            10mL      10 mL,           Univer

s



     (NS)                                     Slow IV           ity of



     injection      01:04:                               Push, PRN,           [place]

[place]



     10 mL      34                                 Starting           Medical



                                                  on Thu           [facility]anch



                                                  22 at           



                                                  2004,           



                                                  Until           



                                                  Discontinu           



                                                  ed,            



                                                  Routine,           



                                                  line           



                                                  maintenanc           



                                                  e              

 

     lidocaine            Yes            5mL       5 mL,           [facility]



     1% (PF)                                     Subcutaneo           ity of



     (XYLOCAINE)      01:04:                               us, PRN,           [facility]

[place]



     injection 5      34                                 Starting           Medi

[facility]



     mL                                           on Thu           [place]



                                                  22 at           



                                                  2004,           



                                                  Until           



                                                  Discontinu           



                                                  ed,            



                                                  Routine,           



                                                  Local           



                                                  anesthesia           

 

     nystatin            Yes                      Topical,           Unive

rs



     (NYSTOP)                                     BID, First           ity o

f



     powder      01:00:                               dose on           [place]



               00                                 u            Medical



                                                  22 at           Branch



                                                  2000,           



                                                  Until           



                                                  Discontinu           



                                                  ed,            



                                                  Routine           

 

     Sliding            Yes                      Subcutaneo           Univ

ers



     Scale                                     us, TID           ity of



     Insulin -      22:00:                               MEALS+HS,           [facility]

as



     Lispro      00                                 First dose           Medical



     (HumaLOG) +                                         (after           [facility]anch



     Fsbg                                         last           



     Testing                                         modificati           



                                                  on) on 22 at           



                                                  1700,           



                                                  Until           



                                                  Discontinu           



                                                  ed,            



                                                  Routine           

 

     insulin      2022- No             3U        3 Units,           Unive

rs



     lispro                                Subcutaneo           ity of



     (human)      22:00: 14:02                          us, TID           [place]



     (HumaLOG      00   :21                           MEALS,           Medical



     U-100)                                         First dose           Branch



     injection 3                                         on Thu           



     Units                                         22 at           



                                                  1700,           



                                                  Until           



                                                  Discontinu           



                                                  ed,            



                                                  Routine           

 

     mupirocin            Yes                                     [facility]



     (BACTROBAN                                                    ity of



     OINT) 2 %      21:45:                                              [place]



     skin      00                                                Medical



     ointment                                                        Branch

 

     collagenase            Yes                      Topical           Uni

vers



     (SANTYL)                                     (Apply To           ity of



     ointment      21:45:                               Affected           [place]



               00                                 Areas),           Medical



                                                  DAILY,           Branch



                                                  First dose           



                                                  (after           



                                                  last           



                                                  modificati           



                                                  on) on u           



                                                  22 at           



                                                  1645,           



                                                  Until           



                                                  Discontinu           



                                                  ed,            



                                                  Routine           

 

     HYDROcodone            Yes            1{tbl}      1 tablet,          

 [facility]



     -acetaminop                                     Oral,           ity of



     hen (NORCO      19:54:                               Q6HPRN,           [place]

[place]



     5) 5-325 mg      07                                 Starting           Medi

[facility]



     tablet 1                                         on Thu           [facility]anch



     tablet                                         22 at           



                                                  1454,           



                                                  Until           



                                                  Discontinu           



                                                  ed,            



                                                  Routine,           



                                                  Pain           



                                                  (scale           



                                                  4-6)           

 

     HYDROcodone            Yes            1{tbl}      1 tablet,          

 [facility]



     -acetaminop                                     Oral,           ity of



     hen (NORCO)      19:47:                               Q6HPRN,           [facility]

as



      mg      33                                 Starting           Medica

l



     tablet 1                                         on Thu           Branch



     tablet                                         22 at           



                                                  1447,           



                                                  Until           



                                                  Discontinu           



                                                  ed,            



                                                  Routine,           



                                                  Pain           



                                                  (scale           



                                                  7-10)           

 

     lactated      2022- No             1000mL      at 100           Univ

ers



     ringers IV       07-30                          mL/hr,           ity of



     infusion      18:30: 19:10                          1,000 mL,           [facility]

as



     1,000 mL      00   :52                           IV             Medical



                                                  Infusion,           Branch



                                                  CONTINUOUS           



                                                  , Starting           



                                                  on u           



                                                  22 at           



                                                  1330,           



                                                  Until Sat           



                                                  22 at           



                                                  1410,           



                                                  Routine           

 

     fluconazole      2022- No             200mg      200 mg,           U

nivers



     (DIFLUCAN)      28                          Oral,           ity of



     tablet 200      14:00: 19:47                          DAILY,           [place]

[place]



     mg        00   :17                           First dose           Medical



                                                  on u           Branch



                                                  22 at           



                                                  0900,           



                                                  Until           



                                                  Discontinu           



                                                  ed,            



                                                  ASAP<br>Re           



                                                  ason for           



                                                  Anti-Infec           



                                                  tive:           



                                                  Empiric           



                                                  Therapy           



                                                  for            



                                                  Suspected           



                                                  Infection<           



                                                  br>Empiric           



                                                  Therapy           



                                                  Site:           



                                                  Urine<br&g           



                                                  t;Duration           



                                                  of             



                                                  therapy:           



                                                  72 hours           

 

     NaCl 0.45%       No             1000mL      at 150           Un

[place]



     (1/2NS) IV                                mL/hr,           ity of



     infusion      02:15: 13:47                          1,000 mL,           [facility]

as



     1,000 mL      00   :19                           IV             Medical



                                                  Infusion,           Branch



                                                  CONTINUOUS           



                                                  , Starting           



                                                  on Wed           



                                                  22 at           



                                                  2115,           



                                                  Until Thu           



                                                  22 at           



                                                  0847,           



                                                  Routine           

 

     proMETHazin       No             12.5mg      12.5 mg,          

 [facility]



     e                                   IV             ity of



     (PHENERGAN)      02:15: 01:35                          [place], [place]



     12.5 mg in      00   :00                           ONCE, 1           Medica

l



     NaCl 0.9%                                         dose, On           Branch



     (NS) 50 mL                                         Wed            



     IV                                           22 at           



     piggyback                                         211,           



                                                  Routine           

 

     insulin       No             10U       10 Units,           Univ

ers



     lispro                                Subcutaneo           ity of



     (human)      01:30: 00:44                          us, ONCE,           [place]

[place]



     (HumaLOG      00   :00                           1 dose, On           Medic

al



     U-100)                                         Wed            Branch



     injection                                         22 at           



     10 Units                                         2030, ASAP           

 

     ertapenem       No             1000mg      1,000 mg,           

[facility]



     (INVANZ)                                Intramuscu           ity 

of



     injection      01:30: 18:10                          lar, Q24H           [facility]

[place]



     1,000 mg      00   :43                           ABX, First           Medic

al



                                                  dose on           Branch



                                                  Wed            



                                                  22 at           



                                                  2030,           



                                                  Until           



                                                  Discontinu           



                                                  ed,            



                                                  ASAP<br&gt           



                                                  ;Reason           



                                                  for            



                                                  Anti-Infec           



                                                  tive:           



                                                  Empiric           



                                                  Therapy           



                                                  for            



                                                  Suspected           



                                                  Infection<           



                                                  br>Empiric           



                                                  Therapy           



                                                  Site:           



                                                  Urine<br>D           



                                                  uration of           



                                                  therapy:           



                                                  72             



                                                  hours<br>R           



                                                  estricted           



                                                  use            



                                                  approved           



                                                  by:            



                                                  History of           



                                                  ESBL           



                                                  infection           



                                                  in past 3           



                                                  months           

 

     HYDROmorphO      2022- No             1mg       1 mg,           Univ

ers



     ne                                  Intravenou           ity of



     (DILAUDID)      01:01: 19:45                          s, Q4HPRN,           

[place]



     injection 1      21   :38                           Starting           Medi

[facility]



     mg                                           on Wed           Branch



                                                  22 at           



                                                  2001,           



                                                  Until Thu           



                                                  22 at           



                                                  1445,           



                                                  Routine,           



                                                  Pain           



                                                  (scale           



                                                  7-10)<br>U           



                                                  se             



                                                  approved           



                                                  by             



                                                  (Faculty):           



                                                  ADC            



                                                  PROVIDER           

 

     Sliding      2022- No                       Subcutaneo           Uni

vers



     Scale       07-28                          us, Q3H,           ity of



     Insulin -      01:00: 18:35                          First dose           [facility]

[place]



     Lispro      00   :13                           (after           Medical



     (HumaLOG) +                                         last           Branch



     Fsbg                                         modificati           



     Testing                                         on) on 22 at           



                                                  2000,           



                                                  Until           



                                                  Discontinu           



                                                  ed,            



                                                  Routine           

 

     pantoprazol            Yes            40mg      40 mg,           Univ

ers



     e                                        Oral,           ity of



     (PROTONIX)      00:30:                               DAILY,           [place]



     EC tablet      00                                 First dose           Medi

sar[facility]



     40 mg                                         on Wed           Branch



                                                  22 at           



                                                  1930,           



                                                  Until           



                                                  Discontinu           



                                                  ed,            



                                                  Routine           

 

     FENTanyl PF      2022- No             50ug      50 mcg,           Un

[facility]



     (SUBLIMAZE                                Intramuscu           it

y of



     (PF))      22:51: 01:01                          lar,           [place]



     injection      14   :59                           Q4HPRN,           Medical



     50 mcg                                         Starting           Branch



                                                  on 22 at           



                                                  1751,           



                                                  Until 22 at           



                                                  ,           



                                                  Routine,           



                                                  Pain           



                                                  (scale           



                                                  7-10)           

 

     Sliding      2022- No                       Subcutaneo           Uni

vers



     Scale                                us, Q3H,           ity of



     Insulin -      22:00: 00:29                          First dose           [facility]

[place]



     Lispro      00   :25                           on Wed           Medical



     (HumaLOG) +                                         22 at           Br

anch



     Fsbg                                         1700,           



     Testing                                         Until           



                                                  Discontinu           



                                                  ed,            



                                                  Routine           

 

     acetaminoph            Yes            1000mg      1,000 mg,          

 [facility]



     en                                       Oral, Q8H,           ity of



     (TYLENOL)      21:15:                               First dose           [facility]

[place]



     tablet      00                                 on Wed           Medical



     1,000 mg                                         22 at           [facility]

h



                                                  1615,           



                                                  Until           



                                                  Discontinu           



                                                  ed,            



                                                  Routine           

 

     FENTanyl PF      2022- No             50ug      50 mcg,           Un

[facility]



     (SUBLIMAZE                                Slow IV           ity o

f



     (PF))      21:07: 22:51                          Push,           [place]



     injection      07   :27                           Q4HPRN,           Medical



     50 mcg                                         Starting           Branch



                                                  on 22 at           



                                                  1607,           



                                                  Until 22 at           



                                                  1751,           



                                                  Routine,           



                                                  Pain           



                                                  (scale           



                                                  7-10)           

 

     insulin      2022- No             30U       30 Units,           Univ

ers



     glargine                                Subcutaneo           ity 

of



     (LANTUS      19:30: 18:47                          us, DAILY,           [facility]

as



     U-100)      00   :44                           First dose           Medical



     injection                                         (after           Branch



     30 Units                                         last           



                                                  modificati           



                                                  on) on 22 at           



                                                  1430,           



                                                  Until           



                                                  Discontinu           



                                                  ed,            



                                                  Routine           

 

     glucagon            Yes            1mg       1 mg,           [facility]



     (GLUCAGEN                                     Intramuscu           ity 

of



     DIAGNOSTIC      19:19:                               lar, PRN,           [facility]

[place]



     KIT)      57                                 Starting           Medical



     injection 1                                         on Wed           Branch



     mg                                           22 at           



                                                  1419,           



                                                  Until           



                                                  Discontinu           



                                                  ed, ASAP,           



                                                  Blood           



                                                  Glucose           



                                                  &amp;lt;           



                                                  or = 70           



                                                  mg/dL and           



                                                  patient is           



                                                  unable to           



                                                  swallow or           



                                                  has mental           



                                                  changes.           

 

     dextrose 50            Yes            25mL      25 mL,           Univ

ers



     % in water                                     Slow IV           ity of



     (D50W)      19:19:                               Push, PRN,           [place]



     injection      57                                 Starting           Medica

l



     25 mL                                         on Wed           Branch



                                                  22 at           



                                                  1419,           



                                                  Until           



                                                  Discontinu           



                                                  ed, ASAP,           



                                                  Blood           



                                                  Glucose           



                                                  &amp;lt;           



                                                  or = 70           



                                                  mg/dL and           



                                                  patient is           



                                                  unable to           



                                                  swallow or           



                                                  has mental           



                                                  status           



                                                  changes.           

 

     FENTanyl PF      2022- No             25ug      25 mcg,           Un

[facility]



     (SUBLIMAZE                                Slow IV           ity o

f



     (PF))      18:57: 21:09                          Push,           [place]



     injection      03   :29                           Q4HPRN,           Medical



     25 mcg                                         Starting           Branch



                                                  on 22 at           



                                                  1357,           



                                                  Until 22 at           



                                                  1609,           



                                                  Routine,           



                                                  Pain           



                                                  (scale           



                                                  7-10)           

 

     D5W IV      2022- No             1000mL      at 500           Univer

s



     infusion                                mL/hr, IV           ity o

f



     1,000 mL      18:30: 20:00                          Infusion,           Eric

as



               00   :00                           ONCE, 1           Medical



                                                  dose, On           Branch



                                                  22 at           



                                                  1330,           



                                                  Routine           

 

     NaCl 0.9%      2022- No             1000mL      at 999           Uni

vers



     (NS) bolus                                mL/hr,           ity of



     infusion      18:15: 20:19                          1,000 mL,           Eric

as



     1,000 mL      00   :00                           IV             Medical



                                                  Infusion,           Branch



                                                  ONCE, 1           



                                                  dose, On           



                                                  22 at           



                                                  1315, STAT           

 

     potassium       No             20meq      20 mEq, IV           

[facility]



     chloride 20                                Piggyback,           i

ty of



     mEq/100 mL      17:45: 21:20                          Q1H, 4           [place]

[place]



     (KCL) 20      00   :00                           doses,           Medical



     mEq/100 mL                                         First dose           [facility]

[facility]



     RTU IVPB 20                                         on Wed           



     mEq                                          22 at           



                                                  1245, Last           



                                                  dose on           



                                                  22 at           



                                                  1500, 100           



                                                  mL             

 

     KCL       2022- No                       IV             [facility]



     (POTASSIUM                                Infusion,           ity

 of



     CHLORIDE)      17:30: 00:27                          CONTINUOUS           T

[place]



     40 mEq in      00   :47                           , Starting           Medi

[facility]



     NaCl 0.45%                                         on Wed           Branch



     (1/2NS) IV                                         22 at           



     Solution                                         1230,           



                                                  Until 22 at           



                                                  1927,           



                                                  1,000 mL,           



                                                  at 200           



                                                  mL/hr           

 

     KCL        No                       IV             [facility]



     (POTASSIUM                                Infusion,           ity

 of



     CHLORIDE)      15:45: 17:15                          CONTINUOUS           T

[place]



     40 mEq in      00   :42                           , Starting           Medi

[facility]



     NaCl 0.45%                                         on Wed           Branch



     (1/2NS) IV                                         22 at           



     Solution                                         1045,           



                                                  Until 22 at           



                                                  1215,           



                                                  1,000 mL,           



                                                  at 150           



                                                  mL/hr           

 

     ondansetron            Yes            4mg       4 mg, Slow           

[facility]



     (ZOFRAN                                     IV Push,           ity of



     (PF))      15:38:                               Q6HPRN,           [place]



     injection 4      50                                 Nausea and           Me

dical



     mg                                           Vomiting           Branch



                                                  (N/V),           



                                                  Starting           



                                                  on 22 at           



                                                  1038<br>Do           



                                                  ses of           



                                                  ondansetro           



                                                  n 16 mg           



                                                  and above           



                                                  need to be           



                                                  administer           



                                                  ed via IV           



                                                  piggyback.           



                                                  For Dose           



                                                  >=24mg ECG           



                                                  monitoring           



                                                  is             



                                                  advisable.           



                                                  <br>           

 

     enoxaparin            Yes            40mg      40 mg,           Unive

rs



     (LOVENOX)                                     Subcutaneo           ity 

of



     injection      14:00:                               us, DAILY,           [facility]

[place]



     40 mg      00                                 First dose           Medical



                                                  on Wed           Branch



                                                  22 at           



                                                  0900,           



                                                  Until           



                                                  Discontinu           



                                                  ed,            



                                                  Routine           

 

     fluconazole      2022- No             200mg      at 100           Un

[facility]



     (DIFLUCAN)                                mL/hr, IV           ity

 of



     Piggyback      13:45: 00:25                          Piggyback,           T

[place]



     200 mg      00   :36                           Q24H ABX,           Medical



                                                  First dose           Branch



                                                  on 22 at           



                                                  0845,           



                                                  Until           



                                                  Discontinu           



                                                  ed,            



                                                  ASAP<br>Do           



                                                  Not            



                                                  Refrigerat           



                                                  e.<br>           

 

     NORepinephr      2022- No             .05ug/k      0.05-1.5         

  [facility]



     ine 4 mg in                      g/min      mcg/kg/min           

ity of



     0.9% NaCl      12:21: 22:51                          ?127 kg           [place]

[facility]



     250 mL      22   :36                           (23.8125-7           Medical



     infusion                                         14.375           Branch



     RTU                                          mL/hr,           



                                                  rounded to           



                                                  23..           



                                                  38 mL/hr),           



                                                  IV             



                                                  Infusion,           



                                                  TITRATE,           



                                                  MAP Goal >           



                                                  or = 65           



                                                  mmHg,           



                                                  Starting           



                                                  on 22 at           



                                                  0721<br>In           



                                                  itiate           



                                                  titration           



                                                  at 0.05           



                                                  mcg/kg/min           



                                                  .&nbsp;&nb           



                                                  sp;Increas           



                                                  e by 0.01           



                                                  mcg/kg/min           



                                                  every 30           



                                                  seconds to           



                                                  5 minutes           



                                                  as needed           



                                                  to reach           



                                                  and            



                                                  maintain           



                                                  goal blood           



                                                  pressure.&           



                                                  nbsp;&nbsp           



                                                  ;Maximum           



                                                  dose = 1.5           



                                                  mcg/kg/min           



                                                  .&nbsp;&nb           



                                                  sp;If goal           



                                                  not            



                                                  maintained           



                                                  at maximum           



                                                  allowed           



                                                  dose,           



                                                  contact           



                                                  prescriber           



                                                  .<br>           

 

     potassium       No             10meq      10 mEq, IV           

[facility]



     chloride in                                Piggyback,           i

ty of



     water 10      12:00: 14:44                          Q1H, 3           [place]



     mEq/100 mL      00   :00                           doses,           Medical



     RTU 10 mEq                                         First dose           Bra

nch



                                                  on 22 at           



                                                  0700, Last           



                                                  dose on           



                                                  22 at           



                                                  0900,           



                                                  Administer           



                                                  over 60           



                                                  Minutes,           



                                                  100 mL           

 

     aztreonam      2022- No             1000mg      1,000 mg,           

[facility]



     (AZACTAM)                                Slow IV           ity of



     injection      12:00: 12:41                          Push, Q8H           [facility]

[place]



     1,000 mg      00   :55                           ABX, First           Medic

al



                                                  dose on           [facility]



                                                  22 at           



                                                  0700,           



                                                  Until           



                                                  Discontinu           



                                                  ed<br>Reas           



                                                  on for           



                                                  Anti-Infec           



                                                  tive:           



                                                  Empiric           



                                                  Therapy           



                                                  for            



                                                  Suspected           



                                                  Infection<           



                                                  br>Empiric           



                                                  Therapy           



                                                  Site:           



                                                  Urine&lt;b           



                                                  r>Duration           



                                                  of             



                                                  therapy: 7           



                                                  days           

 

     NaCl 0.9%      2022- No             1000mL      at 999           Uni

vers



     (NS) IV                                mL/hr, IV           ity of



     infusion      11:45: 11:49                          Infusion,           Eric

as



     1,000 mL      00   :30                           ONCE, 1           Medical



                                                  dose, On           [facility]



                                                  22 at           



                                                  0645,           



                                                  Routine           

 

     NaCl 0.45%      2022- No             1000mL      at 250           Un

[facility]



     (1/2NS) IV                                mL/hr,           ity of



     infusion      11:30: 14:41                          1,000 mL,           Eric

as



     1,000 mL      00   :42                           IV             Medical



                                                  Infusion,           [facility]



                                                  CONTINUOUS           



                                                  , Starting           



                                                  on 22 at           



                                                  0630,           



                                                  Until 22 at           



                                                  0941, ASAP           

 

     insulin      2022- No             0U/kg/h      0-0.3           Unive

rs



     regular in                                Units/kg/h           it

y of



     0.9 % NaCl      11:19: 19:18                          r ?127 kg           T

[place]



     (MYXREDLIN)      08   :18                           (0-38.1           Medic

al



     100                                          mL/hr), IV           [facility]



     unit/100 mL                                         Infusion,           



     (1 unit/mL)                                         TITRATE,           



     RTU IV                                         Parameters           



     infusion                                         in Admin.           



                                                  Instr.,           



                                                  Follow DKA           



                                                  Insulin           



                                                  Rate           



                                                  Adjustment           



                                                  Protocol,           



                                                  Starting           



                                                  on 22 at           



                                                  0619<br>-           



                                                  Follow           



                                                  'DKA           



                                                  Insulin           



                                                  Rate           



                                                  Adjustment           



                                                  Protocol'           



                                                  &nbsp;-           



                                                  Start           



                                                  insulin           



                                                  infusion           



                                                  at 0.1           



                                                  units/kg           



                                                  /hr. When           



                                                  adjusting           



                                                  rate, do           



                                                  not            



                                                  increase           



                                                  rate above           



                                                  0.3            



                                                  units/kg/h           



                                                  our.&nbsp;           



                                                  - Hold           



                                                  insulin           



                                                  and [facility]           



                                                  STAT if           



                                                  potassium           



                                                  is less           



                                                  than 3.3           



                                                  mEq/L.&nbs           



                                                  p;- [facility]           



                                                  STAT if           



                                                  blood           



                                                  glucose           



                                                  less than           



                                                  100 mg/dL           



                                                  or greater           



                                                  than 600           



                                                  mg/dL or           



                                                  if blood           



                                                  glucose           



                                                  not            



                                                  decreased           



                                                  by 50           



                                                  mg/dL in           



                                                  the first           



                                                  hour of           



                                                  insulin           



                                                  infusion.&           



                                                  nbsp;-           



                                                  Once blood           



                                                  glucose is           



                                                  less than           



                                                  or equal           



                                                  to 200           



                                                  mg/dL in           



                                                  patient           



                                                  with DKA           



                                                  or blood           



                                                  glucose is           



                                                  less than           



                                                  or equal           



                                                  to 300           



                                                  mg/dL in           



                                                  patient           



                                                  with HHS,           



                                                  change to           



                                                  fluids           



                                                  with           



                                                  dextrose.&           



                                                  nbsp;&amp;           



                                                  nbsp;&nbsp           



                                                  ;- If           



                                                  during           



                                                  insulin           



                                                  infusion           



                                                  and on           



                                                  dextrose           



                                                  fluids           



                                                  blood           



                                                  glucose is           



                                                  less than           



                                                  150 mg/dL           



                                                  in DKA or           



                                                  less than           



                                                  200 mg/dL           



                                                  in [facility]HS,           



                                                  [facility] for           



                                                  increase           



                                                  in             



                                                  dextrose           



                                                  provided           



                                                  in             



                                                  continuous           



                                                  fluids.&nb           



                                                  sp;- Once           



                                                  AGAP less           



                                                  than 12,           



                                                  blood           



                                                  glucose           



                                                  less than           



                                                  200 mg/dL,           



                                                  TCO2           



                                                  greater           



                                                  than 15 x           



                                                  2 and           



                                                  patient           



                                                  ready to           



                                                  eat, [facility]           



                                                  for SubQ           



                                                  glargine           



                                                  order two           



                                                  hours           



                                                  before           



                                                  stopping           



                                                  insulin           



                                                  infusion.<           



                                                  br>            

 

     NaCl 0.9%      2022- No             1000mL      at 999           Uni

vers



     (NS) IV                                mL/hr, IV           ity of



     infusion      08:30: 11:00                          Infusion,           Eric

as



     1,000 mL      00   :00                           ONCE, 1           Medical



                                                  dose, On           [facility]



                                                  22 at           



                                                  0330,           



                                                  Routine           

 

     NaCl 0.45%      2022- No             1000mL      at 250           Un

[place]



     (1/2NS) IV                                mL/hr,           ity of



     infusion      06:00: 11:20                          1,000 mL,           Eric

as



     1,000 mL      00   :23                           IV             Medical



                                                  Infusion,           [facility]



                                                  CONTINUOUS           



                                                  , Starting           



                                                  on 22 at           



                                                  0100,           



                                                  Until 22 at           



                                                  0620, ASAP           

 

     FENTanyl PF       No             50ug      50 mcg,           Un

[facility]



     (SUBLIMAZE                                Slow IV           ity o

f



     (PF))      04:15: 03:07                          Push,           [place]



     injection      00   :00                           ONCE, 1           Medical



     50 mcg                                         dose, On           [facility]



                                                  Tue            



                                                  22 at           



                                                  2315,           



                                                  Routine           

 

     cefTRIAXone      2022- No             1000mg      1,000 mg,         

  [facility]



     (ROCEPHIN)                                Intravenou           it

y of



     1,000 mg in      04:00: 06:06                          s, ONCE, 1          

 [place]



     NaCl 0.9%      00   :00                           dose, On           Medica

l



     (NS) 50 mL                                         Tue            [facility]



     MINI-BAG                                         22 at           



                                                  2300,           



                                                  Administer           



                                                  over 30           



                                                  Minutes,           



                                                  50             



                                                  mL<br&gt;R           



                                                  [place]ira for           



                                                  Anti-Infec           



                                                  tive:           



                                                  Documented           



                                                  Infection<           



                                                  br>Documen           



                                                  huma            



                                                  Infection           



                                                  Site:           



                                                  Urine<br&g           



                                                  t;Duration           



                                                  of             



                                                  Therapy:           



                                                  Other (see           



                                                  Comments)           

 

     NaCl 0.9%      2022- No             1000mL      at 999           Uni

vers



     (NS) bolus                                mL/hr,           ity of



     infusion      03:00: 04:14                          1,000 mL,           Eric

as



     1,000 mL      00   :00                           IV             Medical



                                                  Infusion,           [facility]



                                                  ONCE, 1           



                                                  dose, On           



                                                  22 at           



                                                  2200, STAT           

 

     insulin      2022- No             10U       10 Units,           Univ

ers



     regular                                Subcutaneo           ity o

f



     human      01:30: 00:37                          us, ONCE,           [place]



     (HUMULIN R)      00   :00                           1 dose, On           Me

dical



     injection                                         Tue            [facility]



     10 Units                                         22 at           



                                                  2030,           



                                                  Routine           

 

     FENTanyl PF      2022- No             50ug      50 mcg,           Un

[facility]



     (SUBLIMAZE                                Slow IV           ity o

f



     (PF))      01:30: 00:28                          Push,           [place]



     injection      00   :00                           ONCE, 1           Medical



     50 mcg                                         dose, On           [facility]



                                                  Tue            



                                                  22 at           



                                                  2030,           



                                                  Routine           

 

     NaCl 0.9%      2022- No             1000mL      at 999           Uni

vers



     (NS) bolus                                mL/hr,           ity of



     infusion      00:30: 02:09                          1,000 mL,           Eric

as



     1,000 mL      00   :00                           IV             Medical



                                                  Infusion,           [facility]



                                                  ONCE, 1           



                                                  dose, On           



                                                  22 at           



                                                  1930, STAT           

 

     iopamidol      2022- No        [id number] 65mL      65 mL,         

  [facility]



     (ISOVUE                 [id number]           Intravenou           ity

 of



     370-500 mL)      02:29: 02:45                          s, ONCE, 1          

 [place]



     injection      00   :00                           dose, On           Medica

l



     65 mL                                         Fri            [place]



                                                  7/15/22 at           



                                                  2145,           



                                                  Routine           

 

     FENTanyl PF      2022- No             150ug      150 mcg,           

[facility]



     (SUBLIMAZE                                Slow IV           ity o

f



     (PF))      01:32: 01:44                          Push,           [place]



     injection      00   :00                           ONCE, 1           Medical



     150 mcg                                         dose, On           [facility]



                                                  Fri            



                                                  7/15/22 at           



                                                  2045,           



                                                  Routine           

 

     FENTanyl PF      -2022- No             50ug      50 mcg,           Un

[facility]



     (SUBLIMAZE      7-16 07-15                          Slow IV           ity o

f



     (PF))      00:30: 23:32                          Push,           [place]



     injection      00   :00                           ONCE, 1           Medical



     50 mcg                                         dose, On           [facility]



                                                  Fri            



                                                  7/15/22 at           



                                                  1930,           



                                                  Routine           

 

     insulin      2022- No             10U       10 Units,           Univ

ers



     regular      7-16 07-15                          Subcutaneo           ity o

f



     human      00:30: 23:28                          us, ONCE,           [place]



     (HUMULIN R)      00   :00                           1 dose, On           Me

dical



     injection                                         Fri            [facility]



     10 Units                                         7/15/22 at           



                                                  1930,           



                                                  Routine           

 

     clindamycin      2022- No        [id number] 600mg      Take 2      

     [facility]



     300 mg      7-15 07-23           [id number]           capsules           ity of



     capsule      00:00: 04:59                          by mouth 4           [facility]

as



               00   :00                           (four)           Medical



                                                  times           [place]



                                                  daily for           



                                                  7 days.           

 

     clindamycin      2022- No        [id number] 600mg      Take 2      

     [facility]



     300 mg      7-15 07-23           [id number]           capsules           ity of



     capsule      00:00: 04:59                          by mouth 4           [facility]

as



               00   :00                           (four)           Medical



                                                  times           [place]



                                                  daily for           



                                                  7 days.           

 

     insulin NPH      2022- No             8U        8 Units,           U

nivers



     and regular      -06                          Subcutaneo           i

ty of



     human 70-30      12:30: 11:30                          us, ONCE,           

[place]



     (70-30      00   :00                           1 dose, On           Medical



     U-100                                         Wed 22           [facility]



     INSULIN)                                         at 0730,           



     100 unit/mL                                         ASAP           



     (70-30)                                                        



     injection 8                                                        



     Units                                                        

 

     insulin            Yes       [id number] 20U       inject 20           U

nivers



     glargine      7-02                               Units           ity of



     100 unit/mL      00:00:                               under the           [facility]

[facility]



     injection      00                                 skin           [facility]



                                                  daily.           [facility]

 

     insulin            Yes       [id number] 20U       inject 20           U

nivers



     glargine      7-02                               Units           ity of



     100 unit/mL      00:00:                               under the           [facility]

[facility]



     injection      00                                 skin           Medical



                                                  daily.           [facility]

 

     insulin            Yes       [id number] 20U       inject 20           U

nivers



     glargine      7-02                               Units           ity of



     100 unit/mL      00:00:                               under the           [facility]

[facility]



     injection      00                                 skin           Medical



                                                  daily.           [facility]

 

     insulin            Yes       [id number] 20U       inject 20           U

nivers



     glargine      7-02                               Units           ity of



     100 unit/mL      00:00:                               under the           [facility]

[facility]



     injection      00                                 skin           Medical



                                                  daily.           [facility]

 

     insulin      2022- No        [id number] 20U       inject 20           

[facility]



     glargine       07-30                          Units           ity of



     100 unit/mL      00:00: 00:00                          under the           

[place]



     injection      00   :00                           skin           Medical



                                                  daily.           [facility]

 

     insulin      2022- No        [id number] 20U       inject 20           

[facility]



     glargine       07-30                          Units           ity of



     100 unit/mL      00:00: 00:00                          under the           

[place]



     injection      00   :00                           skin           Medical



                                                  daily.           [facility]

 

     insulin            Yes            20U       20 Units,           Unive

rs



     glargine                                     Subcutaneo           ity o

f



     (LANTUS      14:00:                               us, DAILY,           [place]

[place]



     U-100)      00                                 First dose           Medical



     injection                                         (after           [facility]



     20 Units                                         last           



                                                  modificati           



                                                  on) on 22 at           



                                                  0900,           



                                                  Until           



                                                  Discontinu           



                                                  ed,            



                                                  Routine           

 

     insulin      2022- No        [id number] 26U       inject 26           

[facility]



     lispro,       08-                          Units           ity of



     human, 100      00:00: 04:59                          under the           [facility]

[facility]



     unit/mL      00   :00                           skin 3           Medical



     injection                                         (three)           [facility]



                                                  times           



                                                  daily           



                                                  before           



                                                  meals for           



                                                  30 days.           

 

     insulin      2022- No        [id number] 26U       inject 26           

[facility]



     lispro,       08-01                          Units           ity of



     human, 100      00:00: 04:59                          under the           [facility]

[facility]



     unit/mL      00   :00                           skin 3           Medical



     injection                                         (three)           [facility]



                                                  times           



                                                  daily           



                                                  before           



                                                  meals for           



                                                  30 days.           

 

     insulin      2022- No        [id number] 26U       inject 26           

[facility]



     lispro,       08-01                          Units           ity of



     human, 100      00:00: 04:59                          under the           [facility]

[facility]



     unit/mL      00   :00                           skin 3           Medical



     injection                                         (three)           [facility]



                                                  times           



                                                  daily           



                                                  before           



                                                  meals for           



                                                  30 days.           

 

     insulin      2022- No        [id number] 26U       inject 26           

[facility]



     lispro,       08-                          Units           ity of



     human, 100      00:00: 04:59                          under the           T

[facility]



     unit/mL      00   :00                           skin 3           Medical



     injection                                         (three)           [facility]



                                                  times           



                                                  daily           



                                                  before           



                                                  meals for           



                                                  30 days.           

 

     insulin      2022- No        [id number] 26U       inject 26           

[facility]



     lispro,       07-30                          Units           ity of



     human, 100      00:00: 00:00                          under the           [facility]

[facility]



     unit/mL      00   :00                           skin 3           Medical



     injection                                         (three)           [facility]



                                                  times           



                                                  daily           



                                                  before           



                                                  meals for           



                                                  30 days.           

 

     insulin      2022- No        [id number] 26U       inject 26           

[facility]



     lispro,       07-30                          Units           ity of



     human, 100      00:00: 00:00                          under the           T

[facility]



     unit/mL      00   :00                           skin 3           Medical



     injection                                         (three)           [facility]



                                                  times           



                                                  daily           



                                                  before           



                                                  meals for           



                                                  30 days.           

 

     insulin            Yes            26U       26 Units,           Unive

rs



     lispro      6-30                               Subcutaneo           ity of



     (human)      22:15:                               [place], TIDAC,           [place]

[place]



     (HumaLOG      00                                 First dose           Medic

al



     U-100)                                         (after           [facility]



     injection                                         last           



     26 Units                                         modificati           



                                                  on) on u           



                                                  22 at           



                                                  1715,           



                                                  Until           



                                                  Discontinu           



                                                  ed,            



                                                  Routine           

 

     insulin       No             22U       22 Units,           Univ

ers



     lispro       06-30                          Subcutaneo           ity of



     (human)      16:30: 21:36                          us, TIDAC,           [facility]

as



     (HumaLOG      00   :36                           First dose           Medic

al



     U-100)                                         (after           [facility]



     injection                                         last           



     22 Units                                         modificati           



                                                  on) on u           



                                                  22 at           



                                                  1130,           



                                                  Until           



                                                  Discontinu           



                                                  ed,            



                                                  Routine           

 

     insulin       No             10U       10 Units,           Univ

ers



     glargine      -30                          Subcutaneo           ity 

of



     (LANTUS      14:00: 17:40                          us, DAILY,           [facility]

as



     U-100)      00   :01                           First dose           Medical



     injection                                         on Thu           [facility]



     10 Units                                         22 at           



                                                  0900,           



                                                  Until           



                                                  Discontinu           



                                                  ed,            



                                                  Routine           

 

     insulin            Yes            52U       52 Units,           Unive

rs



     glargine      -30                               Subcutaneo           ity o

f



     (LANTUS      02:00:                               us, [facility],           [place]



     U-100)      00                                 First dose           Medical



     injection                                         on Wed           [facility]



     52 Units                                         22 at           



                                                  2100,           



                                                  Until           



                                                  Discontinu           



                                                  ed,            



                                                  Routine           

 

     enoxaparin            Yes            40mg      40 mg,           Unive

rs



     (LOVENOX)      -                               Subcutaneo           ity 

of



     injection      22:00:                               us, DAILY,           [place]

[place]



     40 mg      00                                 First dose           Medical



                                                  on Wed           [facility]



                                                  22 at           



                                                  1700,           



                                                  Until           



                                                  Discontinu           



                                                  ed,            



                                                  Routine           

 

     Sliding            Yes                      Subcutaneo           Univ

ers



     Scale                                     us, TID           ity of



     Insulin -      17:00:                               MEALS+HS,           [facility]

as



     Lispro      00                                 First dose           Medical



     (HumaLOG) +                                         (after           [facility]



     Fsbg                                         last           



     Testing                                         modificati           



                                                  on) on 22 at           



                                                  1200,           



                                                  Until           



                                                  Discontinu           



                                                  ed,            



                                                  Routine           

 

     insulin       No             18U       18 Units,           Univ

ers



     lispro                                Subcutaneo           ity of



     (human)      16:30: 14:46                          us, TIDAC,           [facility]

as



     (HumaLOG      00   :32                           First dose           Medic

al



     U-100)                                         (after           [facility]



     injection                                         last           



     18 Units                                         modificati           



                                                  on) on 22 at           



                                                  1130,           



                                                  Until           



                                                  Discontinu           



                                                  ed,            



                                                  Routine           

 

     amLODIPine            Yes            10mg      10 mg,           Unive

rs



     (NORVASC)                                     Oral,           ity of



     tablet 10      14:00:                               DAILY,           [place]



     mg        00                                 First dose           Medical



                                                  on Wed           [facility]



                                                  22 at           



                                                  0900,           



                                                  Until           



                                                  Discontinu           



                                                  ed,            



                                                  Routine           

 

     cefTRIAXone            Yes            1000mg      1,000 mg,          

 [facility]



     (ROCEPHIN)                                     IV             ity of



     1,000 mg in      13:00:                               Piggyback,           

[place]



     NaCl 0.9%      00                                 Q24H ABX,           Medic

al



     (NS) 50 mL                                         First dose           [facility]

[facility]



     MINI-BAG                                         on 22 at           



                                                  0800,           



                                                  Until           



                                                  Discontinu           



                                                  ed,            



                                                  Administer           



                                                  over 30           



                                                  Minutes,           



                                                  50             



                                                  mL<br>Reas           



                                                  on for           



                                                  Anti-Infec           



                                                  tive:           



                                                  Documented           



                                                  Infection<           



                                                  br>Documen           



                                                  huma            



                                                  Infection           



                                                  Site:           



                                                  Urine<br>D           



                                                  uration of           



                                                  Therapy: 7           



                                                  days           

 

     Sliding                             Subcutaneo           Uni

vers



     Scale                                us, TID           ity of



     Insulin -      13:00: 15:19                          MEALS+HS,           [facility]

[place]



     Lispro      00   :51                           First dose           Medical



     (HumaLOG) +                                         on Wed           [facility]



     Fsbg                                         22 at           



     Testing                                         0800,           



                                                  Until           



                                                  Discontinu           



                                                  ed,            



                                                  Routine           

 

     insulin                   15U       15 Units,           Univ

ers



     lispro                                Subcutaneo           ity of



     (human)      12:30: 15:19                          us, TIDAC,           [facility]

as



     (HumaLOG      00   :51                           First dose           Medic

al



     U-100)                                         on Wed           [facility]



     injection                                         22 at           



     15 Units                                         0730,           



                                                  Until           



                                                  Discontinu           



                                                  ed,            



                                                  Routine           

 

     HYDROmorpho      2022- No             1mg       1 mg, Slow          

 [facility]



     ne         07                          IV Push,           ity of



     (DILAUDID)      11:32: 11:31                          Q6HPRN,           [facility]

as



     injection 1      00   :00                           Starting           Medi

[place]ah



     mg                                           on Wed           [facility]



                                                  22 at           



                                                  0632,           



                                                  Until 22 at           



                                                  0631,           



                                                  Routine,           



                                                  Pain           



                                                  (scale           



                                                  7-10)<br>U           



                                                  se             



                                                  approved           



                                                  by             



                                                  (Faculty):           



                                                  [facility]            



                                                  PROVIDER           

 

     NaCl 0.9%      2022- No             1000mL      at 999           Uni

vers



     (NS) bolus                                mL/hr,           ity of



     infusion      11:00: 11:04                          1,000 mL,           Eric

as



     1,000 mL      00   :00                           IV             Medical



                                                  Infusion,           Branch



                                                  ONCE, 1           



                                                  dose, On           



                                                  22 at           



                                                  0600, STAT           

 

     HYDROcodone            Yes            1{tbl}      1 tablet,          

 [facility]



     -acetaminop                                     Oral,           ity of



     hen (NORCO      10:54:                               Q6HPRN,           [place]

s



     5) 5-325 mg      27                                 Starting           Medi

sarah



     tablet 1                                         on Wed           Branch



     tablet                                         22 at           



                                                  0554,           



                                                  Until           



                                                  Discontinu           



                                                  ed,            



                                                  Routine,           



                                                  Pain           



                                                  (scale           



                                                  4-6)           

 

     dextrose            Yes            250mL      250 mL, IV           Un

[facility]



     10% (D10W)                                     Infusion,           ity 

of



     bolus      10:53:                               PRN - SEE           [place]



     infusion      43                                 INSTRUCTIO           Medic

al



     250 mL                                         NS,            Branch



                                                  Administer           



                                                  over 60           



                                                  Minutes,           



                                                  hypoglycem           



                                                  ia,            



                                                  Starting           



                                                  on 22 at           



                                                  0553<br>De           



                                                  xtrose 10%           



                                                  250 mL bag           



                                                  contains:&           



                                                  nbsp;10 gm           



                                                  = 100           



                                                  mL&nbsp;20           



                                                  gm = 200           



                                                  mL&nbsp;25           



                                                  gm = 250           



                                                  mL (whole           



                                                  bag)&nbsp;           



                                                  &nbsp;The           



                                                  maximum           



                                                  rate at           



                                                  which           



                                                  dextrose           



                                                  can be           



                                                  infused           



                                                  without           



                                                  producing           



                                                  glycosuria           



                                                  is 0.5           



                                                  g/kg/hour.           



                                                  &nbsp;&nbs           



                                                  p;BUD: If           



                                                  wrapper is           



                                                  open bag           



                                                  is good           



                                                  for 30           



                                                  days at           



                                                  room           



                                                  temperatur           



                                                  e.&nbsp;<b           



                                                  r>             

 

     glucagon            Yes            1mg       1 mg,           [facility]



     (GLUCAGEN                                     Intramuscu           ity 

of



     DIAGNOSTIC      10:53:                               lar, PRN,           [facility]

[place]



     KIT)      40                                 Starting           Medical



     injection 1                                         on Wed           Branch



     mg                                           22 at           



                                                  0553,           



                                                  Until           



                                                  Discontinu           



                                                  ed, ASAP,           



                                                  Blood           



                                                  Glucose           



                                                  &amp;lt;           



                                                  or = 70           



                                                  mg/dL and           



                                                  patient is           



                                                  unable to           



                                                  swallow or           



                                                  has mental           



                                                  changes.           

 

     acetaminoph            Yes            650mg      650 mg,           Un

[facility]



     en                                       Oral,           ity of



     (TYLENOL)      10:52:                               Q6HPRN,           [place]



     tablet 650      33                                 Starting           Medic

al



     mg                                           on 22 at           



                                                  0552,           



                                                  Until           



                                                  Discontinu           



                                                  ed,            



                                                  Routine,           



                                                  Pain           



                                                  (scale           



                                                  1-3)           

 

     NaCl 0.9%      2022- No             1000mL      at 999           Uni

vers



     (NS) bolus                                mL/hr,           ity of



     infusion      09:00: 08:10                          1,000 mL,           Eric

as



     1,000 mL      00   :00                           IV             Medical



                                                  Infusion,           Branch



                                                  ONCE, 1           



                                                  dose, On           



                                                  22 at           



                                                  0400, STAT           

 

     FENTanyl PF      2022- No             50ug      50 mcg,           Un

[facility]



     (SUBLIMAZE                                Slow IV           ity o

f



     (PF))      08:22: 08:27                          Push,           [place]



     injection      00   :00                           ONCE, 1           Medical



     50 mcg                                         dose, On           Branch



                                                  22 at           



                                                  0330,           



                                                  Routine           

 

     insulin       No             10U       10 Units,           Univ

ers



     regular                                Slow IV           ity of



     human      07:00: 05:53                          Push,           [place]



     (HUMULIN R)      00   :00                           ONCE, 1           Medic

al



     injection                                         dose, On           



     10 Units                                         22 at           



                                                  0200, STAT           

 

     FENTanyl PF      2022- No             50ug      50 mcg,           Un

[facility]



     (SUBLIMAZE                                Slow IV           ity o

f



     (PF))      04:15: 04:04                          Push,           [place]



     injection      00   :00                           ONCE, 1           Medical



     50 mcg                                         dose, On           Branch



                                                  22 at           



                                                  2315, STAT           

 

     NaCl 0.9%      2022- No             1000mL      at 999           Uni

vers



     (NS) bolus                                mL/hr,           ity of



     infusion      04:00: 05:53                          1,000 mL,           Eric

as



     1,000 mL      00   :00                           IV             Medical



                                                  Infusion,           Branch



                                                  ONCE, 1           



                                                  dose, On           



                                                  22 at           



                                                  2300, STAT           

 

     insulin      2022- No             10U       10 Units,           Univ

ers



     regular                                Slow IV           ity of



     human      04:00: 03:48                          Push,           [place]



     (HUMULIN R)      00   :00                           ONCE, 1           Medic

al



     injection                                         dose, On           Branch



     10 Units                                         e            



                                                  22 at           



                                                  2300, STAT           

 

     amLODIPine      -2022- No        [id number] 10mg      Take 1           

[facility]



     10 mg      6-26 07-30                          tablet by           ity of



     tablet      00:00: 00:00                          mouth           [place]



               00   :00                           daily for           Medical



                                                  30 days.           Branch

 

     amLODIPine      2022- No        [id number] 10mg      Take 1           

[facility]



     10 mg      6-26 07-30                          tablet by           ity of



     tablet      00:00: 00:00                          mouth           [place]



               00   :00                           daily for           Medical



                                                  30 days.           Branch

 

     amLODIPine      2022- No        [id number] 10mg      Take 1           

[facility]



     10 mg      6-26 07-27                          tablet by           ity of



     tablet      00:00: 04:59                          mouth           [place]



               00   :00                           daily for           Medical



                                                  30 days.           Branch

 

     amLODIPine      2022- No        [id number] 10mg      Take 1           

[facility]



     10 mg      6-26 07-27                          tablet by           ity of



     tablet      00:00: 04:59                          mouth           [place]



               00   :00                           daily for           Medical



                                                  30 days.           Branch

 

     amLODIPine      2022- No        [id number] 10mg      Take 1           

[facility]



     10 mg      6-26 07-27                          tablet by           ity of



     tablet      00:00: 04:59                          mouth           [place]



               00   :00                           daily for           Medical



                                                  30 days.           Branch

 

     amLODIPine      2022- No        [id number] 10mg      Take 1           

[facility]



     10 mg      6-26 07-27                          tablet by           ity of



     tablet      00:00: 04:59                          mouth           [place]



               00   :00                           daily for           Medical



                                                  30 days.           Branch

 

     amLODIPine      2022- No        [id number] 10mg      Take 1           

[facility]



     10 mg      6-26 07-27                          tablet by           ity of



     tablet      00:00: 04:59                          mouth           [place]



               00   :00                           daily for           Medical



                                                  30 days.           Branch

 

     amLODIPine      2022- No        [id number] 10mg      Take 1           

[facility]



     10 mg      6-26 07-27                          tablet by           ity of



     tablet      00:00: 04:59                          mouth           [place]



               00   :00                           daily for           Medical



                                                  30 days.           Branch

 

     amLODIPine      -2022- No        [id number] 10mg      Take 1           

[facility]



     10 mg                                tablet by           ity of



     tablet      00:00: 04:59                          mouth           [place]



               00   :00                           daily for           Medical



                                                  30 days.           [place]

 

     HYDROmorpho       No             .5mg      0.5 mg,           Un

[facility]



     ne                                  Slow IV           ity of



     (DILAUDID)      18:00: 17:11                          Push,           [place]



     injection      00   :00                           ONCE, 1           Medical



     0.5 mg                                         dose, On           Branch



                                                  Sat            



                                                  22 at           



                                                  1300,           



                                                  Routine<br           



                                                  >Use           



                                                  approved           



                                                  by             



                                                  (Faculty):           



                                                  [facility]            



                                                  PROVIDER           

 

     cefTRIAXone            Yes            1000mg      1,000 mg,          

 [facility]



     (ROCEPHIN)                                     IV             ity of



     1,000 mg in      15:00:                               Piggyback,           

[place]



     NaCl 0.9%      00                                 Q24H ABX,           Medic

al



     (NS) 50 mL                                         First dose           [facility]

[facility]



     MINI-BAG                                         on Sat           



                                                  22 at           



                                                  1000,           



                                                  Until           



                                                  Discontinu           



                                                  ed,            



                                                  Administer           



                                                  over 30           



                                                  Minutes,           



                                                  50             



                                                  mL<br>Reas           



                                                  on for           



                                                  Anti-Infec           



                                                  tive:           



                                                  Documented           



                                                  Infection<           



                                                  br>Documen           



                                                  huma            



                                                  Infection           



                                                  Site:           



                                                  Urine<br>D           



                                                  uration of           



                                                  Therapy: 7           



                                                  days           

 

     fluconazole            Yes            200mg      200 mg,           Un

[facility]



     (DIFLUCAN)                                     Oral,           ity of



     tablet 200      14:00:                               DAILY,           [place]



     mg        00                                 First dose           Medical



                                                  on [facility]



                                                  22 at           



                                                  0900,           



                                                  Until           



                                                  Discontinu           



                                                  ed,            



                                                  ASAP<br>Re           



                                                  ason for           



                                                  Anti-Infec           



                                                  tive:           



                                                  Documented           



                                                  Infection<           



                                                  br>Documen           



                                                  huma            



                                                  Infection           



                                                  Site:           



                                                  Urine<br>D           



                                                  uration of           



                                                  Therapy: 7           



                                                  days           

 

     Sliding            Yes                      Subcutaneo           Univ

ers



     Scale                                     us, TID           ity of



     Insulin -      13:00:                               MEALS+HS,           Eric

as



     Lispro      00                                 First dose           Medical



     (HumaLOG) +                                         on [facility]



     Fsbg                                         22 at           



     Testing                                         0800,           



                                                  Until           



                                                  Discontinu           



                                                  ed,            



                                                  Routine           

 

     hydromorpho      2022- No             4ug       Take 4 mcg          

 [facility]



     ne HCl                                by mouth           ity of



     (HYDROMORPH      11:55: 00:00                          every 12           [facility]

[place]



     ONE ORAL)      19   :00                           (twelve)           Medica

l



                                                  hours.           [place]

 

     insulin      2022- No             10U       10 Units,           Univ

ers



     lispro                                Subcutaneo           ity of



     (human)      07:00: 06:53                          us, ONCE,           [place]

s



     (HumaLOG      00   :00                           1 dose, On           Medic

al



     U-100)                                         Sat            Branch



     injection                                         22 at           



     10 Units                                         0200,           



                                                  Routine           

 

     glucagon            Yes            1mg       1 mg,           [facility]



     (GLUCAGEN                                     Intramuscu           ity 

of



     DIAGNOSTIC      05:50:                               lar, PRN,           Te

[place]



     KIT)      51                                 Starting           Medical



     injection 1                                         on Sat           Branch



     mg                                           22 at           



                                                  0050,           



                                                  Until           



                                                  Discontinu           



                                                  ed, ASAP,           



                                                  Blood           



                                                  Glucose           



                                                  &amp;lt;           



                                                  or = 70           



                                                  mg/dL and           



                                                  patient is           



                                                  unable to           



                                                  swallow or           



                                                  has mental           



                                                  changes.           

 

     dextrose            Yes            250mL      250 mL, IV           Un

[facility]



     10% (D10W)                                     Infusion,           ity 

of



     bolus      05:50:                               PRN - SEE           [place]



     infusion      51                                 INSTRUCTIO           Medic

al



     250 mL                                         NS,            Br[facility]



                                                  Administer           



                                                  over 60           



                                                  Minutes,           



                                                  BS < 70,           



                                                  Starting           



                                                  on Sat           



                                                  22 at           



                                                  0050<br>De           



                                                  xtrose 10%           



                                                  250 mL bag           



                                                  contains:&           



                                                  nbsp;10 gm           



                                                  = 100           



                                                  mL&nbsp;20           



                                                  gm = 200           



                                                  mL&nbsp;25           



                                                  gm = 250           



                                                  mL (whole           



                                                  bag)&nbsp;           



                                                  &nbsp;The           



                                                  maximum           



                                                  rate at           



                                                  which           



                                                  dextrose           



                                                  can be           



                                                  infused           



                                                  without           



                                                  producing           



                                                  glycosuria           



                                                  is 0.5           



                                                  g/kg/hour.           



                                                  &nbsp;&nbs           



                                                  p;BUD: If           



                                                  wrapper is           



                                                  open bag           



                                                  is good           



                                                  for 30           



                                                  days at           



                                                  room           



                                                  temperatur           



                                                  e.&nbsp;<b           



                                                  r>             

 

     insulin            Yes            52U       52 Units,           Unive

rs



     glargine                                     Subcutaneo           ity o

f



     (LANTUS      02:00:                               us, [facility],           [place]



     U-100)      00                                 First dose           Medical



     injection                                         on Fri           Branch



     52 Units                                         22 at           



                                                  2100,           



                                                  Until           



                                                  Discontinu           



                                                  ed,            



                                                  Routine           

 

     Sliding      2022- No                       Subcutaneo           Uni

vers



     Scale                                us, TID           ity of



     Insulin -      02:00: 05:50                          MEALS+HS,           [place]

[place]



     Lispro      00   :40                           First dose           Medical



     (HumaLOG) +                                         on Fri           Branch



     Fsbg                                         22 at           



     Testing                                         2100,           



                                                  Until           



                                                  Discontinu           



                                                  ed,            



                                                  Routine           

 

     ciprofloxac      2022- No        [id number] 500mg      Take 1         

  [facility]



     in HCl 500      --                          tablet by           ity

 of



     mg tablet      00:00: 04:59                          mouth           [place]



               00   :00                           every 12           Medical



                                                  (twelve)           Branch



                                                  hours for           



                                                  10 days.           

 

     ciprofloxac      -2022- No        [id number] 500mg      Take 1         

  [facility]



     in HCl 500      --                          tablet by           ity

 of



     mg tablet      00:00: 04:59                          mouth           [place]



               00   :00                           every 12           Medical



                                                  (twelve)           Branch



                                                  hours for           



                                                  10 days.           

 

     ciprofloxac      2022- No        [id number] 500mg      Take 1         

  [facility]



     in HCl 500      -                          tablet by           ity

 of



     mg tablet      00:00: 04:59                          mouth           [place]



               00   :00                           every 12           Medical



                                                  (twelve)           Branch



                                                  hours for           



                                                  10 days.           

 

     HYDROmorpho      -2022- No        4647 4mg       Take 1           Uni

vers



     ne 4 mg      --                          tablet by           ity of



     tablet      00:00: 04:59                          mouth           [place]



               00   :00                           every 12           Medical



                                                  (twelve)           Branch



                                                  hours for           



                                                  7 days.           



                                                  Indication           



                                                  s: acute           



                                                  pain           

 

     HYDROmorpho      2022- No        4647 4mg       Take 1           Uni

vers



     ne 4 mg                                tablet by           ity of



     tablet      00:00: 04:59                          mouth           [place]



               00   :00                           every 12           Medical



                                                  (twelve)           Branch



                                                  hours for           



                                                  7 days.           



                                                  Indication           



                                                  s: acute           



                                                  pain           

 

     HYDROmorpho      2022- No        4647 4mg       Take 1           Uni

vers



     ne 4 mg      -                          tablet by           ity of



     tablet      00:00: 04:59                          mouth           [place]



               00   :00                           every 12           Medical



                                                  (twelve)           Branch



                                                  hours for           



                                                  7 days.           



                                                  Indication           



                                                  s: acute           



                                                  pain           

 

     HYDROmorpho      -2022- No        4647 4mg       Take 1           Uni

vers



     ne 4 mg      --                          tablet by           ity of



     tablet      00:00: 04:59                          mouth           [place]



               00   :00                           every 12           Medical



                                                  (twelve)           Branch



                                                  hours for           



                                                  7 days.           



                                                  Indication           



                                                  s: acute           



                                                  pain           

 

     ciprofloxac      2022- No        [id number] 500mg      Take 1         

  [facility]



     in HCl 500      -                          tablet by           ity

 of



     mg tablet      00:00: 00:00                          mouth           [place]



               00   :00                           every 12           Medical



                                                  (twelve)           Branch



                                                  hours for           



                                                  10 days.           

 

     NaCl 0.9%      2022- No             1000mL      at 150           Uni

vers



     (NS) bolus      -24                          mL/hr,           ity of



     infusion      23:15: 23:38                          1,000 mL,           Eric

as



     1,000 mL      00   :00                           IV             Medical



                                                  Piggyback,           [place]



                                                  ONCE, 1           



                                                  dose, On           



                                                  22 at           



                                                  1815, STAT           

 

     HYDROmorpho      -0 - No             1mg       1 mg, Slow          

 [facility]



     ne        24                          IV Push,           ity of



     (DILAUDID)      22:15: 21:46                          ONCE, 1           Eric

as



     injection 1      00   :00                           dose, On           Medi

sarah



     mg                                           22 at           



                                                  1715,           



                                                  Routine<br           



                                                  >Use           



                                                  approved           



                                                  by             



                                                  (Faculty):           



                                                  ADC            



                                                  PROVIDER           

 

     enoxaparin      -0      Yes            40mg      40 mg,           Unive

rs



     (LOVENOX)                                     Subcutaneo           ity 

of



     injection      22:00:                               us, DAILY,           [facility]

[place]



     40 mg      00                                 First dose           Medical



                                                  on 22 at           



                                                  1700,           



                                                  Until           



                                                  Discontinu           



                                                  ed,            



                                                  Routine           

 

     glucagon      -0      Yes            1mg       1 mg,           [facility]



     (GLUCAGEN      24                               Intravenou           ity 

of



     DIAGNOSTIC      21:57:                               s, PRN -           Eric

as



     KIT)      53                                 SEE            Medical



     injection 1                                         INSTRUCTIO           Br

anch



     mg                                           NS,            



                                                  Starting           



                                                  on 22 at           



                                                  1657,           



                                                  Until           



                                                  Discontinu           



                                                  ed,            



                                                  Routine,           



                                                  hypoglycem           



                                                  ia             

 

     glucagon      -0      Yes            1mg       1 mg,           [facility]



     (GLUCAGEN      24                               Intramuscu           ity 

of



     DIAGNOSTIC      21:57:                               lar, PRN,           Te

[place]



     KIT)      44                                 Starting           Medical



     injection 1                                         on 



                                                22 at           



                                                  1657,           



                                                  Until           



                                                  Discontinu           



                                                  ed, ASAP,           



                                                  Blood           



                                                  Glucose           



                                                  &amp;lt;           



                                                  or = 70           



                                                  mg/dL and           



                                                  patient is           



                                                  unable to           



                                                  swallow or           



                                                  has mental           



                                                  changes.           

 

     HYDROmorpho      -0      Yes            4mg       4 mg,           Unive

rs



     ne                                       Oral,           ity of



     (DILAUDID)      13:00:                               Q12H,           [place]



     tablet 4 mg      00                                 First dose           Me

dical



                                                  on 22 at           



                                                  0800,           



                                                  Until           



                                                  Discontinu           



                                                  ed             

 

     insulin      -0      Yes            15U       15 Units,           Unive

rs



     lispro      -24                               Subcutaneo           ity of



     (human)      12:30:                               us, TIMariana HAIDER

s



     (HumaLOG      00                                 First dose           Medic

al



     U-100)                                         on Fri           Branch



     injection                                         22 at           



     15 Units                                         0730,           



                                                  Until           



                                                  Discontinu           



                                                  ed,            



                                                  Routine           

 

     amLODIPine            Yes            10mg      10 mg,           Unive

rs



     (NORVASC)                                     Oral,           ity of



     tablet 10      08:45:                               DAILY,           [place]



     mg        00                                 First dose           Medical



                                                  on Fri           Branch



                                                  22 at           



                                                  0345,           



                                                  Until           



                                                  Discontinu           



                                                  ed,            



                                                  Routine           

 

     ertapenem      2022- No             1000mg      1,000 mg,           

[facility]



     (INVANZ)                                IV             ity of



     1,000 mg in      08:45: 13:51                          Piggyback,          

 [place]



     NaCl 0.9%      00   :26                           Q24H ABX,           Medic

al



     (NS) 50 mL                                         First dose           Bra

nch



     MINI-BAG                                         on 22 at           



                                                  0345,           



                                                  Until           



                                                  Discontinu           



                                                  ed,            



                                                  Administer           



                                                  over 30           



                                                  Minutes,           



                                                  50             



                                                  mL<br>Reas           



                                                  on for           



                                                  Anti-Infec           



                                                  tive:           



                                                  Empiric           



                                                  Therapy           



                                                  for            



                                                  Suspected           



                                                  Infection<           



                                                  br>Empiric           



                                                  Therapy           



                                                  Site:           



                                                  Urine<br>D           



                                                  uration of           



                                                  therapy: 7           



                                                  days<br>Re           



                                                  stricted           



                                                  use            



                                                  approved           



                                                  by:            



                                                  History of           



                                                  ESBL           



                                                  infection           



                                                  in past 3           



                                                  months           

 

     hydralAZINE            Yes            10mg      10 mg,           Univ

ers



     (APRESOLINE                                     Slow IV           ity o

f



     ) injection      08:33:                               Push,           [place]



     10 mg      28                                 Q6HPRN,           Medical



                                                  Starting           Branch



                                                  on 22 at           



                                                  0333,           



                                                  Until           



                                                  Discontinu           



                                                  ed,            



                                                  Routine,           



                                                  DBP=&gt;10           



                                                  0;             



                                                  SBP=>160,           



                                                  For SBP >           



                                                  160            

 

     acetaminoph            Yes            650mg      650 mg,           Un

y[facility]elyn



     en                                       Oral,           ity of



     (TYLENOL)      07:37:                               Q6HPRN,           [place]



     tablet 650      37                                 Starting           Medic

al



     mg                                           on Fri           Branch



                                                  22 at           



                                                  0237,           



                                                  Until           



                                                  Discontinu           



                                                  ed,            



                                                  Routine,           



                                                  Pain           



                                                  (scale           



                                                  1-3)           

 

     iopamidol      2022- No        [id number] 100mL      100 mL,          

 [facility]



     (ISOVUE                                Intravenou           ity o

f



     370-500 mL)      06:15: 05:05                          s, ONCE, 1          

 [place]



     injection      00   :00                           dose, On           Medica

l



     100 mL                                         Fri            Branch



                                                  22 at           



                                                  0115,           



                                                  Routine           

 

     insulin      2022- No             10U       10 Units,           Univ

ers



     regular      24                          IV Push,           ity of



     human      06:00: 05:20                          ONCE, 1           [place]



     (HUMULIN R)      00   :00                           dose, On           Medi

sarah



     injection                                         Fri            Branch



     10 Units                                         22 at           



                                                  0100, ASAP           

 

     FENTanyl PF      2022- No             50ug      50 mcg,           Un

[facility]



     (SUBLIMAZE                                Slow IV           ity o

f



     (PF))      05:45: 04:47                          Push,           [place]



     injection      00   :00                           ONCE, 1           Medical



     50 mcg                                         dose, On           Branch



                                                  Fri            



                                                  22 at           



                                                  0045, STAT           

 

     FENTanyl PF      2022- No             50ug      50 mcg,           Un

[facility]



     (SUBLIMAZE                                Slow IV           ity o

f



     (PF))      04:15: 04:00                          Push,           [place]



     injection      00   :00                           ONCE, 1           Medical



     50 mcg                                         dose, On           Branch



                                                  Thu            



                                                  22 at           



                                                  2315, STAT           

 

     NaCl 0.9%      2022- No             1000mL      at 999           Uni

vers



     (NS) bolus                                mL/hr,           ity of



     infusion      04:15: 06:41                          1,000 mL,           Eric

as



     1,000 mL      00   :00                           IV             Medical



                                                  Infusion,           [facility]



                                                  ONCE, 1           



                                                  dose, On           



                                                  Thu            



                                                  22 at           



                                                  2315, STAT           

 

     hydromorpho            Yes            4ug       Take 4 mcg           

[facility]



     ne HCl      6-10                               by mouth           ity of



     (HYDROMORPH      19:50:                               every 12           [facility]

[facility]



     ONE ORAL)      08                                 (twelve)           Medica

l



                                                  hours.           [facility]anch

 

     magnesium            Yes            400mg      400 mg,           Univ

ers



     oxide      6-10                               Oral,           ity of



     (MAG-OX      14:00:                               DAILY,           [place]



     400) tablet      00                                 First dose           Me

dical



     400 mg                                         on Fri           [facility]anch



                                                  6/10/22 at           



                                                  0900,           



                                                  Until           



                                                  Discontinu           



                                                  ed,            



                                                  Routine           

 

     HYDROmorpho      2022- No             1mg       1 mg, Slow          

 [facility]



     ne        6-10 06-10                          IV Push,           ity of



     (DILAUDID)      05:15: 04:42                          ONCE, 1           Eric

as



     injection 1      00   :00                           dose, On           Medi

sarah



     mg                                           Fri            Branch



                                                  6/10/22 at           



                                                  0015,           



                                                  Routine<br           



                                                  >Use           



                                                  approved           



                                                  by             



                                                  (Faculty):           



                                                  ADC            



                                                  PROVIDER           

 

     acetaminoph            Yes            1000mg      1,000 mg,          

 [facility]



     en        6-10                               Oral, Q8H,           ity of



     (TYLENOL)      03:00:                               First dose           [facility]

[facility]



     tablet      00                                 on Thu           Medical



     1,000 mg                                         22 at           [facility]



                                                  2200,           



                                                  Until           



                                                  Discontinu           



                                                  ed,            



                                                  Routine           

 

     insulin      -0      Yes       [id number] 52U       inject 52           U

nivers



     glargine      6-10                               Units           ity of



     100 unit/mL      00:00:                               under the           T

[facility]



     injection      00                                 skin at           Medical



                                                  bedtime.           Branch

 

     insulin      -0      Yes       [id number] 52U       inject 52           U

nivers



     glargine      6-10                               Units           ity of



     100 unit/mL      00:00:                               under the           T

ex[facility]



     injection      00                                 skin at           Medical



                                                  bedtime.           Branch

 

     insulin      -0      Yes       [id number] 52U       inject 52           U

nivers



     glargine      6-10                               Units           ity of



     100 unit/mL      00:00:                               under the           T

ex[facility]



     injection      00                                 skin at           Medical



                                                  bedtime.           Branch

 

     insulin      -0      Yes       [id number] 52U       inject 52           U

nivers



     glargine      6-10                               Units           ity of



     100 unit/mL      00:00:                               under the           T

ex[facility]



     injection      00                                 skin at           Medical



                                                  bedtime.           Branch

 

     insulin      -0      Yes       [id number] 52U       inject 52           U

nivers



     glargine      6-10                               Units           ity of



     100 unit/mL      00:00:                               under the           T

ex[facility]



     injection      00                                 skin at           Medical



                                                  bedtime.           Branch

 

     insulin      -0      Yes       [id number] 52U       inject 52           U

nivers



     glargine      6-10                               Units           ity of



     100 unit/mL      00:00:                               under the           T

ex[facility]



     injection      00                                 skin at           Medical



                                                  bedtime.           Branch

 

     insulin      -0      Yes       [id number] 52U       inject 52           U

nivers



     glargine      6-10                               Units           ity of



     100 unit/mL      00:00:                               under the           T

exas



     injection      00                                 skin at           Medical



                                                  bedtime.           Branch

 

     insulin      -0      Yes       [id number] 52U       inject 52           U

nivers



     glargine      6-10                               Units           ity of



     100 unit/mL      00:00:                               under the           T

ex[facility]



     injection      00                                 skin at           Medical



                                                  bedtime.           Branch

 

     insulin      -0 - No        [id number] 52U       inject 52           

[facility]



     glargine      6-10 07-30                          Units           ity of



     100 unit/mL      00:00: 00:00                          under the           

[place]



     injection      00   :00                           skin at           Medical



                                                  bedtime.           [facility]anch

 

     insulin      2022- No        [id number] 52U       inject 52           

[facility]



     glargine      6-10 07-30                          Units           ity of



     100 unit/mL      00:00: 00:00                          under the           

[place]



     injection      00   :00                           skin at           Medical



                                                  bedtime.           Branch

 

     Insulin      2022- No        [id number]           Use as           Uni

vers



     Safety      6-10 07-09                          directed           ity of



     [place],      00:00: 04:59                                         [place]



     Disp, 29      00   :00                                          Medical



     gauge x                                                        Branch



     3/16" Ndle                                                        

 

     Insulin      2022- No        [id number]           Use as           Uni

vers



     Safety      6-10 07-09                          directed           ity of



     [place],      00:00: 04:59                                         [place]



     Disp, 29      00   :00                                          Medical



     gauge x                                                        Branch



     3/16" Ndle                                                        

 

     Insulin      2022- No        [id number]           Use as           Uni

vers



     Safety      6-10 07-09                          directed           ity of



     [place],      00:00: 04:59                                         [place]



     Disp, 29      00   :00                                          Medical



     gauge x                                                        Branch



     3/16" Ndle                                                        

 

     Insulin      2022- No        [id number]           Use as           Uni

vers



     Safety      6-10 07-09                          directed           ity of



     [place],      00:00: 04:59                                         [place]



     Disp, 29      00   :00                                          Medical



     gauge x                                                        Branch



     3/16" Ndle                                                        

 

     Insulin      2022- No        [id number]           Use as           Uni

vers



     Safety      6-10 07-09                          directed           ity of



     [place],      00:00: 04:59                                         [place]



     Disp, 29      00   :00                                          Medical



     gauge x                                                        Branch



     3/16" Ndle                                                        

 

     Insulin      2022- No        [id number]           Use as           Uni

vers



     Safety      6-10 07-09                          directed           ity of



     [place],      00:00: 04:59                                         [place]



     Disp, 29      00   :00                                          Medical



     gauge x                                                        Branch



     3/16" Ndle                                                        

 

     Insulin      2022- No        [id number]           Use as           Uni

vers



     Safety      6-10 07-09                          directed           ity of



     [place],      00:00: 04:59                                         [place]



     Disp, 29      00   :00                                          Medical



     gauge x                                                        Branch



     3/16" Ndle                                                        

 

     fluconazole      2022- No        [id number] 200mg      Take 1         

  [facility]



     200 mg      6-10 06-23                          tablet by           ity of



     tablet      00:00: 04:59                          mouth           [place]



               00   :00                           daily for           Medical



                                                  12 days.           Branch

 

     magnesium      2022- No             4g        4 g, IV           Univ

ers



     sulfate in                                Piggyback,           it

y of



     water 4      20:45: 21:58                          ONCE, 1           [place]



     gram/50 mL      00   :00                           dose, On           Medic

al



     (8 %) IV                                         u 22           Bran

h



     Piggyback 4                                         at 1545,           



     g                                            Routine           

 

     HYDROmorpho            Yes            4mg       4 mg,           Unive

rs



     ne                                       Oral,           ity of



     (DILAUDID)      19:32:                               Q6HPRN,           [place]

[place]



     tablet 4 mg      47                                 Starting           Medi

[facility]



                                                  on Thu           [facility]



                                                  22 at           



                                                  1432,           



                                                  Until           



                                                  Discontinu           



                                                  ed,            



                                                  Routine,           



                                                  Pain           



                                                  (scale           



                                                  7-10)           

 

     insulin            Yes            30U       30 Units,           Unive

rs



     glargine                                     Subcutaneo           ity o

f



     (LANTUS      02:00:                               us, QHS,           [place]



     U-100)      00                                 First dose           Medical



     injection                                         on Wed           [facility]



     30 Units                                         22 at           



                                                  2100,           



                                                  Until           



                                                  Discontinu           



                                                  ed,            



                                                  Routine           

 

     HYDROmorpho       202- No             .5mg      0.5 mg,           Un

[facility]



     ne         0609                          Slow IV           ity of



     (DILAUDID)      16:28: 19:33                          Push,           [place]



     injection      03   :06                           Q4HPRN,           Medical



     0.5 mg                                         Starting           [facility]



                                                  on 22 at           



                                                  1128,           



                                                  Until u           



                                                  22 at           



                                                  1433,           



                                                  Routine,           



                                                  Pain           



                                                  (scale           



                                                  7-10)<br>U           



                                                  se             



                                                  approved           



                                                  by             



                                                  (Faculty):           



                                                  ADC            



                                                  PROVIDER           

 

     fluconazole      0      Yes            200mg      200 mg,           Un

[facility]



     (DIFLUCAN)                                     Oral,           ity of



     tablet 200      14:45:                               DAILY,           [place]



     mg        00                                 First dose           Medical



                                                  on Wed           [facility]



                                                  22 at           



                                                  0945,           



                                                  Until           



                                                  Discontinu           



                                                  ed,            



                                                  ASAP<br>Re           



                                                  ason for           



                                                  Anti-Infec           



                                                  tive:           



                                                  Empiric           



                                                  Therapy           



                                                  for            



                                                  Suspected           



                                                  Infection<           



                                                  br>Empiric           



                                                  Therapy           



                                                  Site:           



                                                  Urine<br>D           



                                                  uration of           



                                                  therapy:           



                                                  72 hours           

 

     enoxaparin      -0      Yes            40mg      40 mg,           Unive

rs



     (LOVENOX)                                     Subcutaneo           ity 

of



     injection      14:00:                               us, DAILY,           Te

[facility]



     40 mg      00                                 First dose           Medical



                                                  on Wed           [facility]



                                                  22 at           



                                                  0900,           



                                                  Until           



                                                  Discontinu           



                                                  ed,            



                                                  Routine           

 

     tamsulosin      0      Yes            .4mg      0.4 mg,           Univ

ers



     (FLOMAX)                                     Oral,           ity of



     capsule 0.4      14:00:                               DAILY,           [place]

[place]



     mg        00                                 First dose           Medical



                                                  on 22 at           



                                                  0900,           



                                                  Until           



                                                  Discontinu           



                                                  ed,            



                                                  Routine           

 

     insulin       No             10U       10 Units,           Univ

ers



     glargine                                Subcutaneo           ity 

of



     (LANTUS      12:00: 12:51                          us, ONCE,           [place]

[place]



     U-100)      00   :00                           1 dose, On           Medical



     injection                                         22           [facility]

ch



     10 Units                                         at 0700,           



                                                  Routine           

 

     HYDROmorpho      2022- No             1mg       1 mg, Slow          

 [facility]



     ne                                  IV Push,           ity of



     (DILAUDID)      10:00: 09:23                          ONCE, 1           [facility]

as



     injection 1      00   :00                           dose, On           Medi

[facility]



     mg                                           22           [facility]



                                                  at 0500,           



                                                  Routine<br           



                                                  >Use           



                                                  approved           



                                                  by             



                                                  (Faculty):           



                                                  ADC            



                                                  PROVIDER           

 

     HYDROmorpho      2022- No             4mg       4 mg,           Univ

ers



     ne                                  Oral,           ity of



     (DILAUDID)      08:40: 16:28                          [id number],           Te

[facility]



     tablet 4 mg      04   :14                           Starting           Medi

sarah



                                                  on 22 at           



                                                  0340,           



                                                  Until 22 at           



                                                  1128,           



                                                  Routine,           



                                                  Pain           



                                                  (scale           



                                                  7-10)           

 

     HYDROmorpho      2022- No             1mg       1 mg, Slow          

 [facility]



     ne                                  IV Push,           ity of



     (DILAUDID)      06:15: 05:41                          ONCE, 1           [facility]

as



     injection 1      00   :00                           dose, On           Medi

[facility]



     mg                                           22           [facility]



                                                  at 0115,           



                                                  Routine<br           



                                                  >Use           



                                                  approved           



                                                  by             



                                                  (Faculty):           



                                                  ADC            



                                                  PROVIDER           

 

     aztreonam      2022- No             1000mg      1,000 mg,           

[facility]



     (AZACTAM)      6-08 06-10                          IV             ity of



     1,000 mg in      05:30: 16:58                          Piggyback,          

 [place]



     NaCl 0.9%      00   :42                           Q8H ABX,           Medica

l



     (NS) 100 mL                                         First dose           Br

anch



     MINI-BAG                                         on 22 at           



                                                  0030,           



                                                  Until           



                                                  Discontinu           



                                                  ed,            



                                                  Administer           



                                                  over 30           



                                                  Minutes,           



                                                  100            



                                                  mL<br>Reas           



                                                  on for           



                                                  Anti-Infec           



                                                  tive:           



                                                  Empiric           



                                                  Therapy           



                                                  for            



                                                  Suspected           



                                                  Infection<           



                                                  br>Empiric           



                                                  Therapy           



                                                  Site:           



                                                  Urine<br>D           



                                                  uration of           



                                                  therapy: 7           



                                                  days           

 

     Sliding            Yes                      Subcutaneo           [facility]

ers



     Scale      6-08                               us, Q4H,           ity of



     Insulin-Reg      05:00:                               First dose           

[place]



     ular + Fsbg      00                                 on Wed           Medica

l



     Testing                                         22 at           Branch



                                                  0000,           



                                                  Until           



                                                  Discontinu           



                                                  ed,            



                                                  Routine           

 

     NaCl 0.9%      2022- No             1000mL      at 125           Uni

vers



     (NS) IV       06-08                          mL/hr, IV           ity of



     infusion      04:30: 16:28                          Infusion,           Eric

as



     1,000 mL      00   :27                           CONTINUOUS           Medic

al



                                                  , Starting           Branch



                                                  on 22 at           



                                                  2330,           



                                                  Until 22 at           



                                                  1128,           



                                                  Routine           

 

     glucagon            Yes            1mg       1 mg,           [facility]



     (GLUCAGEN                                     Intravenou           ity 

of



     DIAGNOSTIC      04:10:                               s, PRN -           Eric

as



     KIT)      53                                 SEE            Medical



     injection 1                                         INSTRUCTIO           Br

anch



     mg                                           NS,            



                                                  Starting           



                                                  on 22 at           



                                                  2310,           



                                                  Until           



                                                  Discontinu           



                                                  ed,            



                                                  Routine,           



                                                  For Blood           



                                                  glucose <           



                                                  70             

 

     proCHLORper            Yes            10mg      10 mg,           Univ

ers



     azine                                     Slow IV           ity of



     (COMPAZINE)      04:10:                               Push,           [place]



     injection      40                                 Q6HPRN,           Medical



     10 mg                                         Starting           Branch



                                                  on 22 at           



                                                  2310,           



                                                  Until           



                                                  Discontinu           



                                                  ed,            



                                                  Routine,           



                                                  Nausea and           



                                                  Vomiting           



                                                  (N/V)           

 

     FENTanyl PF      2022- No             50ug      50 mcg,           Un

[facility]elyn



     (SUBLIMAZE      -08                          Slow IV           ity o

f



     (PF))      03:04: 03:37                          Push,           [place]



     injection      00   :00                           ONCE, 1           Medical



     50 mcg                                         dose, On           Branch



                                                  22           



                                                  at 2215,           



                                                  STAT           

 

     NaCl 0.9%      2022- No             1000mL      at 999           Uni

vers



     (NS) IV       06-08                          mL/hr,           ity of



     infusion      01:16: 01:54                          Intravenou           Te

[place]



     1,000 mL      00   :00                           s, ONCE, 1           Medic

al



                                                  dose, On           Branch



                                                  22           



                                                  at 2030,           



                                                  Routine           

 

     insulin      2022-0 2022- No             .1U/kg      13.7 Units           U

nivers



     regular      -08                          (rounded           ity of



     human      01:15: 01:51                          from 13.74           [place]



     (HUMULIN R)      00   :00                           Units =           Medic

al



     injection                                         0.1            Branch



     13.7 Units                                         Units/kg           



                                                  ?137.4           



                                                  kg), Slow           



                                                  IV Push,           



                                                  ONCE, 1           



                                                  dose, On           



                                                  22           



                                                  at 2030,           



                                                  STAT           

 

     FENTanyl PF      2022- No             50ug      50 mcg,           Un

[facility]



     (SUBLIMAZE                                Slow IV           ity o

f



     (PF))      01:00: 01:52                          Push,           [place]



     injection      00   :00                           ONCE, 1           Medical



     50 mcg                                         dose, On           Branch



                                                  22           



                                                  at 2015,           



                                                  ASAP           

 

     NaCl 0.9%      2022- No             1000mL      at 999           Uni

vers



     (NS) IV                                mL/hr,           ity of



     infusion      23:47: 00:53                          Intravenou           Te

[facility]



     1,000 mL      00   :00                           s, ONCE, 1           Medic

al



                                                  dose, On           Branch



                                                  22           



                                                  at 1900,           



                                                  ASAP           

 

     hydromorpho      0      Yes            4ug       Take 4 mcg           

[facility]



     ne HCl      6-07                               by mouth           ity of



     (HYDROMORPH      23:17:                               every 12           Te

xas



     ONE ORAL)      17                                 (twelve)           Medica

l



                                                  hours.           Branch

 

     hydromorpho      0      Yes            4ug       Take 4 mcg           

[facility]



     ne HCl      6-06                               by mouth           ity of



     (HYDROMORPH      18:29:                               every 12           Te

xas



     ONE ORAL)      31                                 (twelve)           Medica

l



                                                  hours.           Branch

 

     insulin      0      Yes        15U       inject 15           U

nivers



     lispro,      6-06                               Units           ity of



     human, 100      00:00:                               under the           Te

[facility]



     unit/mL      00                                 skin 3           Medical



     injection                                         (three)           Branch



                                                  times           



                                                  daily           



                                                  before           



                                                  meals.           

 

     insulin      0      Yes        15U       inject 15           U

nivers



     lispro,      6-06                               Units           ity of



     human, 100      00:00:                               under the           Te

[facility]



     unit/mL      00                                 skin 3           Medical



     injection                                         (three)           Branch



                                                  times           



                                                  daily           



                                                  before           



                                                  meals.           

 

     insulin      -0      Yes        15U       inject 15           U

nivers



     lispro,      6-06                               Units           ity of



     human, 100      00:00:                               under the           Te

[facility]



     unit/mL      00                                 skin 3           Medical



     injection                                         (three)           Branch



                                                  times           



                                                  daily           



                                                  before           



                                                  meals.           

 

     insulin      -0      Yes        15U       inject 15           U

nivers



     lispro,      6-06                               Units           ity of



     human, 100      00:00:                               under the           Te

[facility]as



     unit/mL      00                                 skin 3           Medical



     injection                                         (three)           Branch



                                                  times           



                                                  daily           



                                                  before           



                                                  meals.           

 

     insulin      -0      Yes        15U       inject 15           U

nivers



     lispro,      6-06                               Units           ity of



     human, 100      00:00:                               under the           Te

[facility]



     unit/mL      00                                 skin 3           Medical



     injection                                         (three)           Branch



                                                  times           



                                                  daily           



                                                  before           



                                                  meals.           

 

     insulin      0      Yes        15U       inject 15           U

nivers



     lispro,      6-06                               Units           ity of



     human, 100      00:00:                               under the           Te

[facility]



     unit/mL      00                                 skin 3           Medical



     injection                                         (three)           Branch



                                                  times           



                                                  daily           



                                                  before           



                                                  meals.           

 

     insulin      -0      Yes        52U       inject 52           U

nivers



     glargine      6-06                               Units           ity of



     100 unit/mL      00:00:                               under the           T

[facility]



     injection      00                                 skin at           Medical



                                                  bedtime.           Branch

 

     insulin      0      Yes        15U       inject 15           U

nivers



     lispro,      6-06                               Units           ity of



     human, 100      00:00:                               under the           Te

[facility]



     unit/mL      00                                 skin 3           Medical



     injection                                         (three)           Branch



                                                  times           



                                                  daily           



                                                  before           



                                                  meals.           

 

     insulin      -0      Yes        52U       inject 52           U

nivers



     glargine      6-06                               Units           ity of



     100 unit/mL      00:00:                               under the           T

exas



     injection      00                                 skin at           Medical



                                                  bedtime.           Branch

 

     insulin      -0      Yes        15U       inject 15           U

nivers



     lispro,      6-06                               Units           ity of



     human, 100      00:00:                               under the           Te

[facility]as



     unit/mL      00                                 skin 3           Medical



     injection                                         (three)           Branch



                                                  times           



                                                  daily           



                                                  before           



                                                  meals.           

 

     insulin      -0      Yes        15U       inject 15           U

nivers



     lispro,      6-06                               Units           ity of



     human, 100      00:00:                               under the           Te

[facility]as



     unit/mL      00                                 skin 3           Medical



     injection                                         (three)           Branch



                                                  times           



                                                  daily           



                                                  before           



                                                  meals.           

 

     insulin      -0      Yes        15U       inject 15           U

nivers



     lispro,      6-06                               Units           ity of



     human, 100      00:00:                               under the           Te

[facility]as



     unit/mL      00                                 skin 3           Medical



     injection                                         (three)           Branch



                                                  times           



                                                  daily           



                                                  before           



                                                  meals.           

 

     insulin      2022- No        [id number] 15U       inject 15           

[facility]



     lispro,       07-30                          Units           ity of



     human, 100      00:00: 00:00                          under the           T

[facility]as



     unit/mL      00   :00                           skin 3           Medical



     injection                                         (three)           Branch



                                                  times           



                                                  daily           



                                                  before           



                                                  meals.           

 

     insulin      2022- No        [id number] 15U       inject 15           

[facility]



     lispro,       07-30                          Units           ity of



     human, 100      00:00: 00:00                          under the           T

exas



     unit/mL      00   :00                           skin 3           Medical



     injection                                         (three)           Branch



                                                  times           



                                                  daily           



                                                  before           



                                                  meals.           

 

     insulin      2022- No        [id number] 52U       inject 52           

[facility]



     glargine       06-10                          Units           ity of



     100 unit/mL      00:00: 00:00                          under the           

[place]



     injection      00   :00                           skin at           Medical



                                                  bedtime.           Branch

 

     HYDROmorpho      2022- No             .5mg      0.5 mg,           Un

[facility]



     ne         06-06                          Slow IV           ity of



     (DILAUDID)      18:15: 18:14                          Push,           [place]



     injection      00   :00                           Q8HPRN,           Medical



     0.5 mg                                         Starting           Branch



                                                  on Sun           



                                                  22 at           



                                                  1315,           



                                                  Until Mon           



                                                  22 at           



                                                  1314,           



                                                  Routine,           



                                                  Pain           



                                                  (scale           



                                                  7-10)<br>U           



                                                  se             



                                                  approved           



                                                  by             



                                                  (Faculty):           



                                                  [facility]            



                                                  PROVIDER           

 

     insulin            Yes            .4U/kg/      52 Units           Uni

vers



     glargine      6-05                     d         (rounded           ity of



     (LANTUS      14:43:                               from 51.6           [place]



     U-100)      43                                 Units =           Medical



     injection                                         0.4            Branch



     52 Units                                         Units/kg/d           



                                                  ay ?129           



                                                  kg),           



                                                  Subcutaneo           



                                                  us, Q24H,           



                                                  First dose           



                                                  on Sun           



                                                  22 at           



                                                  0944,           



                                                  Until           



                                                  Discontinu           



                                                  ed,            



                                                  Routine           

 

     HYDROmorpho            Yes            4mg       4 mg,           Unive

rs



     ne        6-05                               Oral,           ity of



     (DILAUDID)      02:21:                               Q6HPRN,           [place]

[place]



     tablet 4 mg      55                                 Starting           Medi

sarah



                                                  on Sat           Branch



                                                  22 at           



                                                  2121,           



                                                  Until           



                                                  Discontinu           



                                                  ed,            



                                                  Routine,           



                                                  Pain           



                                                  (scale           



                                                  4-6)           

 

     HYDROmorpho      2022- No             1mg       1 mg, Slow          

 [facility]



     ne         06-05                          IV Push,           ity of



     (DILAUDID)      02:21: 18:05                          Q4HPRN,           Eric

as



     injection 1      39   :59                           Starting           Medi

sarah



     mg                                           on Sat           Branch



                                                  22 at           



                                                  2121,           



                                                  Until Sun           



                                                  22 at           



                                                  1305,           



                                                  Routine,           



                                                  Pain           



                                                  (scale           



                                                  7-10)<br>U           



                                                  se             



                                                  approved           



                                                  by             



                                                  (Faculty):           



                                                  [facility]            



                                                  PROVIDER           

 

     Sliding            Yes                      Subcutaneo           [facility]

ers



     Scale      6-04                               us, Q4H,           ity of



     Insulin -      17:00:                               First dose           [place]

[place]



     lispro      00                                 on Sat           Medical



     (humaLOG) +                                         22 at           [facility]



     Fsbg                                         1200,           



     Testing                                         Until           



                                                  Discontinu           



                                                  ed,            



                                                  Routine           

 

     insulin            Yes            .35U/kg      15 Units           Uni

vers



     lispro      6-04                     /d        (rounded           ity of



     (human)      17:00:                               from 15.05           [place]

s



     (HumaLOG      00                                 Units =           Medical



     U-100)                                         0.35           [facility]



     injection                                         Units/kg/d           



     15 Units                                         ay ?129           



                                                  kg),           



                                                  Subcutaneo           



                                                  us, TID           



                                                  MEALS,           



                                                  First dose           



                                                  on Sat           



                                                  22 at           



                                                  1200,           



                                                  Until           



                                                  Discontinu           



                                                  ed,            



                                                  Routine           

 

     proMETHazin            Yes            25mg      25 mg,           Univ

ers



     e         6-04                               Oral,           ity of



     (PHENERGAN)      15:09:                               Q6HPRN,           Eric

as



     tablet 25      59                                 Starting           Medica

l



     mg                                           on Sat           Branch



                                                  22 at           



                                                  1009,           



                                                  Until           



                                                  Discontinu           



                                                  ed,            



                                                  Routine,           



                                                  Nausea and           



                                                  Vomiting           



                                                  (N/V)           

 

     HYDROmorpho      2022- No             4mg       4 mg,           Univ

ers



     ne        6 06-05                          Oral,           ity of



     (DILAUDID)      14:47: 02:22                          Q6HPRN,           Eric

as



     tablet 4 mg      19   :08                           Starting           Medi

sarah



                                                  on Sat           Branch



                                                  22 at           



                                                  0947,           



                                                  Until Sat           



                                                  22 at           



                                                  2122,           



                                                  Routine,           



                                                  Pain           



                                                  (scale           



                                                  7-10)           

 

     potassium      -0 - No             20meq      20 mEq, IV           

[facility]



     chloride 20       06-04                          Piggyback,           i

ty of



     mEq/100 mL      23:45: 03:11                          Q2H, 2           [place]

s



     (KCL) 20      00   :00                           doses,           Medical



     mEq/100 mL                                         First dose           [facility]



     RTU IVPB 20                                         on Fri           



     mEq                                          6/3/22 at           



                                                  1845, Last           



                                                  dose on           



                                                  Fri 6/3/22           



                                                  at 2000,           



                                                  100 mL           

 

     HYDROmorpho       No             1mg       1 mg, Slow          

 [facility]



     ne                                  IV Push,           ity of



     (DILAUDID)      18:08: 14:45                          Q4HPRN,           Eric

as



     injection 1      31   :34                           Starting           Medi

sarah



     mg                                           on Fri           Branch



                                                  6/3/22 at           



                                                  1308,           



                                                  Until Sat           



                                                  22 at           



                                                  0945,           



                                                  Routine,           



                                                  Pain           



                                                  (scale           



                                                  7-10)<br>U           



                                                  se             



                                                  approved           



                                                  by             



                                                  (Faculty):           



                                                  CLC            



                                                  PROVIDER           

 

     enoxaparin            Yes            40mg      40 mg,           Unive

rs



     (LOVENOX)                                     Subcutaneo           ity 

of



     injection      14:00:                               us, DAILY,           [facility]

[place]



     40 mg      00                                 First dose           Medical



                                                  on Fri           Branch



                                                  6/3/22 at           



                                                  0900,           



                                                  Until           



                                                  Discontinu           



                                                  ed,            



                                                  Routine           

 

     lactobacill            Yes            .5mg      0.5 mg,           Uni

vers



     us                                       Oral, BID,           ity of



     acidophilus      13:00:                               First dose           

[place]



     tablet 0.5      00                                 on Fri           Medical



     mg                                           6/3/22 at           Branch



                                                  0800,           



                                                  Until           



                                                  Discontinu           



                                                  ed,            



                                                  Routine           

 

     docusate            Yes            100mg      100 mg,           Unive

rs



     (COLACE)                                     Oral, BID,           ity o

f



     capsule 100      13:00:                               First dose           

[place]



     mg        00                                 on Fri           Medical



                                                  6/3/22 at           Branch



                                                  0800,           



                                                  Until           



                                                  Discontinu           



                                                  ed,            



                                                  Routine           

 

     cefTRIAXone      2022- No             1000mg      1,000 mg,         

  [facility]



     (ROCEPHIN)                                IV             ity of



     1,000 mg in      11:30: 16:04                          Piggyback,          

 [place]



     NaCl 0.9%      00   :40                           Q24H ABX,           Medic

al



     (NS) 50 mL                                         First dose           Bra

nch



     MINI-BAG                                         on Fri           



                                                  6/3/22 at           



                                                  0630,           



                                                  Until           



                                                  Discontinu           



                                                  ed,            



                                                  Administer           



                                                  over 30           



                                                  Minutes,           



                                                  50             



                                                  mL<br>Reas           



                                                  on for           



                                                  Anti-Infec           



                                                  tive:           



                                                  Documented           



                                                  Infection<           



                                                  br>Documen           



                                                  huma            



                                                  Infection           



                                                  Site:           



                                                  Urine<br>D           



                                                  uration of           



                                                  Therapy: 7           



                                                  days           

 

     proMETHazin      2022- No             25mg      25 mg, IV           

Univers



     e                                   Piggyback,           ity of



     (PHENERGAN)      11:22: 15:10                          Q6HPRN,           Te

[place]



     25 mg in      38   :15                           Starting           Medical



     NaCl 0.9%                                         on Fri           Branch



     (NS) 50 mL                                         6/3/22 at           



     IV                                           0622,           



     piggyback                                         Until Sat           



                                                  22 at           



                                                  1010,           



                                                  Routine,           



                                                  Nausea and           



                                                  Vomiting           



                                                  (N/V)           

 

     acetaminoph            Yes            650mg      650 mg,           Un

yoselyn



     en        03                               Oral,           ity of



     (TYLENOL)      11:04:                               Q6HPRN,           [place]



     tablet 650      03                                 Starting           Medic

al



     mg                                           on Fri           Branch



                                                  6/3/22 at           



                                                  0604,           



                                                  Until           



                                                  Discontinu           



                                                  ed,            



                                                  Routine,           



                                                  Pain           



                                                  (scale           



                                                  1-3), Temp           



                                                  > 38.5 C           

 

     HYDROmorpho      2022- No             2mg       2 mg, Slow          

 [facility]



     ne                                  IV Push,           ity of



     (DILAUDID)      11:03: 18:08                          Q4HPRN,           Eric

as



     injection 2      22   :44                           Starting           Medi

sarah



     mg                                           on Fri           Branch



                                                  6/3/22 at           



                                                  0603,           



                                                  Until Fri           



                                                  6/3/22 at           



                                                  1308,           



                                                  Routine,           



                                                  Pain           



                                                  (scale           



                                                  7-10)<br>U           



                                                  se             



                                                  approved           



                                                  by             



                                                  (Faculty):           



                                                  CLC            



                                                  PROVIDER           

 

     NaCl 0.9%            Yes            10mL      10 mL,           Univer

s



     (NS)                                     Slow IV           ity of



     injection      10:58:                               Push, PRN,           Te

[place]



     10 mL      34                                 Starting           Medical



                                                  on Fri           Branch



                                                  6/3/22 at           



                                                  0558,           



                                                  Until           



                                                  Discontinu           



                                                  ed,            



                                                  Routine,           



                                                  line           



                                                  maintenanc           



                                                  e              

 

     NaCl 0.45%      2022- No             1000mL                     Univ

ers



     (1/2NS)                                               ity of



     1000 mL +      10:30: 14:45                                         [place]



     KCL 20 mEq      00   :34                                          Medical



                                                                 Branch

 

     D5W 0.45%      2022- No                       IV             [facility]



     NaCl                                Infusion,           ity of



     (1/2NS) 1 L      10:21: 14:45                          at 200           Eric

as



     + KCL 20      33   :34                           mL/hr, PRN           Medic

al



     mEq                                          - SEE           Branch



                                                  INSTRUCTIO           



                                                  NS,            



                                                  Starting           



                                                  on Fri           



                                                  6/3/22 at           



                                                  0521,           



                                                  Until Sat           



                                                  22 at           



                                                  0945,           



                                                  ASAP,           



                                                  Blood           



                                                  glucose           



                                                  control           

 

     acetaminoph      2022- No             1000mg      1,000 mg,         

  [facility]



     en        03                          Oral,           ity of



     (TYLENOL)      08:45: 07:36                          ONCE, 1           [place]

s



     tablet      00   :00                           dose, On           Medical



     1,000 mg                                         Fri 6/3/22           [facility]

h



                                                  at 0345,           



                                                  ASAP           

 

     insulin      2022- No             8U        8 Units,           Unive

rs



     regular                                Slow IV           ity of



     human      08:15: 07:13                          Push,           [place]



     (HUMULIN R)      00   :00                           ONCE, 1           Medic

al



     injection 8                                         dose, On           Bran

ch



     Units                                         Fri 6/3/22           



                                                  at 0315,           



                                                  Routine           

 

     NaCl 0.9%      2022- No             1000mL      at 999           Uni

vers



     (NS) bolus                                mL/hr,           ity of



     infusion      07:15: 06:12                          1,000 mL,           Eric

as



     1,000 mL      00   :00                           IV             Medical



                                                  Infusion,           Branch



                                                  ONCE, 1           



                                                  dose, On           



                                                  Fri 6/3/22           



                                                  at 0215,           



                                                  STAT           

 

     insulin       No             8U        8 Units,           Unive

rs



     regular                                Slow IV           ity of



     human      07:15: 06:21                          Push,           [place]



     (HUMULIN R)      00   :00                           ONCE, 1           Medic

al



     injection 8                                         dose, On           Bran

ch



     Units                                         Fri 6/3/22           



                                                  at 0215,           



                                                  STAT           

 

     iopamidol      2022- No        [id number] 100mL      100 mL,          

 [facility]



     (ISOVUE                                Intravenou           ity o

f



     370-500 mL)      05:45: 04:31                          s, ONCE, 1          

 [place]



     injection      00   :00                           dose, On           Medica

l



     100 mL                                         Fri 6/3/22           [facility]



                                                  at 0045,           



                                                  Routine           

 

     meropenem       No             500mg      500 mg, IV           

[facility]



     (MERREM)                                Piggyback,           ity 

of



     500 mg in      05:00: 05:32                          ONCE, 1           [place]

s



     NaCl 0.9%      00   :00                           dose, On           Medica

l



     (NS) 50 mL                                         Fri 6/3/22           [facility]



     MINI-BAG                                         at 0000,           



                                                  Administer           



                                                  over 30           



                                                  Minutes,           



                                                  50             



                                                  mL<br&gt;R           



                                                  estricted           



                                                  use            



                                                  approved           



                                                  by:            



                                                  EMERGENCY           



                                                  DEPARTMENT           



                                                  PRESCRIBER           



                                                  <br>Reason           



                                                  for            



                                                  Anti-Infec           



                                                  tive:           



                                                  Empiric           



                                                  Therapy           



                                                  for            



                                                  Suspected           



                                                  Infection<           



                                                  br>Empiric           



                                                  Therapy           



                                                  Site:           



                                                  Abdominal<           



                                                  br>Duratio           



                                                  n of           



                                                  therapy:           



                                                  72 hours           

 

     proMETHazin      2022- No             25mg      25 mg, IV           

[facility]



     e                                   Piggyback,           ity of



     (PHENERGAN)      04:45: 03:49                          ONCE, 1           [facility]

[place]



     25 mg in      00   :00                           dose, On           Medical



     NaCl 0.9%                                         Thu 22           [facility]

ch



     (NS) 50 mL                                         at 2345,           



     IV                                           ASAP           



     piggyback                                                        

 

     NaCl 0.9%      2022- No             1000mL      at 999           Uni

vers



     (NS) bolus                                mL/hr,           ity of



     infusion      04:30: 06:04                          1,000 mL,           Eric

as



     1,000 mL      00   :00                           IV             Medical



                                                  Infusion,           [place]



                                                  ONCE, 1           



                                                  dose, On           



                                                  u 22           



                                                  at 2330,           



                                                  STAT           

 

     FENTanyl PF       No             100ug      100 mcg,           

[facility]



     (SUBLIMAZE                                Slow IV           ity o

f



     (PF))      03:30: 03:24                          Push,           [place]



     injection      00   :00                           ONCE, 1           Medical



     100 mcg                                         dose, On           [facility] 22           



                                                  at 2230,           



                                                  STAT           

 

     cefTRIAXone            Yes            2000mg      2,000 mg,          

 [facility]



     (ROCEPHIN)      5-17                               Intramuscu           ity

 of



     injection      21:00:                               lar, Q24H,           [facility]

[place]



     2,000 mg      00                                 First dose           [facility]

al



                                                  on Tue           [place]



                                                  22 at           



                                                  1600,           



                                                  Until           



                                                  Discontinu           



                                                  ed,            



                                                  ASAP<br>Re           



                                                  ason for           



                                                  Anti-Infec           



                                                  tive:           



                                                  Documented           



                                                  Infection<           



                                                  br>Documen           



                                                  huma            



                                                  Infection           



                                                  Site: Skin           



                                                  / Soft           



                                                  Tissue<br>           



                                                  Duration           



                                                  of             



                                                  Therapy:           



                                                  Other (see           



                                                  Comments)           

 

     hydromorpho            Yes            4ug       Take 4 mcg           

[facility]



     ne HCl      5-17                               by mouth           ity of



     (HYDROMORPH      17:43:                               every 12           [facility]

[place]



     ONE ORAL)      55                                 (twelve)           [facility]

l



                                                  hours.           [place]

 

     hydromorpho            Yes            4ug       Take 4 mcg           

[facility]



     ne HCl      5-17                               by mouth           ity of



     (HYDROMORPH      17:43:                               every 12           [facility]

[place]



     ONE ORAL)      55                                 (twelve)           [facility]

l



                                                  hours.           [facility]

 

     collagenase            Yes       [id number]           Apply to         

  [facility]



     250       5-17                               affected           ity of



     unit/gram      00:00:                               area(s)           [place]



     ointment      00                                 daily.           Medical



                                                                 [facility]

 

     collagenase            Yes       [id number]           Apply to         

  [facility]



     250       5-17                               affected           ity of



     unit/gram      00:00:                               area(s)           [place]



     ointment      00                                 daily.           Medical



                                                                 [facility]

 

     collagenase      -0      Yes       [id number]           Apply to         

  [facility]



     250       5-17                               affected           ity of



     unit/gram      00:00:                               area(s)           [place]



     ointment      00                                 daily.           Medical



                                                                 [facility]

 

     collagenase      -0      Yes       [id number]           Apply to         

  [facility]



     250       5-17                               affected           ity of



     unit/gram      00:00:                               area(s)           [place]



     ointment      00                                 daily.           Medical



                                                                 [facility]

 

     collagenase      -0      Yes       [id number]           Apply to         

  [facility]



     250       5-17                               affected           ity of



     unit/gram      00:00:                               area(s)           [place]



     ointment      00                                 daily.           Medical



                                                                 [facility]anch

 

     collagenase      -0      Yes       [id number]           Apply to         

  [facility]



     250       5-17                               affected           ity of



     unit/gram      00:00:                               area(s)           [place]



     ointment      00                                 daily.           Medical



                                                                 [facility]

 

     collagenase      -0      Yes       [id number]           Apply to         

  [facility]



     250       5-17                               affected           ity of



     unit/gram      00:00:                               area(s)           [place]



     ointment      00                                 daily.           Medical



                                                                 Branch

 

     collagenase      -0      Yes       [id number]           Apply to         

  [facility]



     250       5-17                               affected           ity of



     unit/gram      00:00:                               area(s)           [place]



     ointment      00                                 daily.           Medical



                                                                 Branch

 

     collagenase      -0      Yes       [id number]           Apply to         

  [facility]



     250       5-17                               affected           ity of



     unit/gram      00:00:                               area(s)           [place]



     ointment      00                                 daily.           Medical



                                                                 Branch

 

     collagenase      -0      Yes       [id number]           Apply to         

  [facility]



     250       5-17                               affected           ity of



     unit/gram      00:00:                               area(s)           [place]



     ointment      00                                 daily.           Medical



                                                                 Branch

 

     collagenase      -0      Yes       [id number]           Apply to         

  [facility]



     250       5-17                               affected           ity of



     unit/gram      00:00:                               area(s)           [place]



     ointment      00                                 daily.           Medical



                                                                 Branch

 

     collagenase      -0      Yes       [id number]           Apply to         

  [facility]



     250       5-17                               affected           ity of



     unit/gram      00:00:                               area(s)           [place]



     ointment      00                                 daily.           Medical



                                                                 Branch

 

     collagenase      -0      Yes       [id number]           Apply to         

  [facility]



     250       5-17                               affected           ity of



     unit/gram      00:00:                               area(s)           [place]



     ointment      00                                 daily.           Medical



                                                                 Branch

 

     collagenase      -0      Yes       [id number]           Apply to         

  [facility]



     250       5-17                               affected           ity of



     unit/gram      00:00:                               area(s)           [place]



     ointment      00                                 daily.           Medical



                                                                 Branch

 

     collagenase      -0      Yes       [id number]           Apply to         

  [facility]



     250       5-17                               affected           ity of



     unit/gram      00:00:                               area(s)           [place]



     ointment      00                                 daily.           Medical



                                                                 Branch

 

     collagenase      -0      Yes       [id number]           Apply to         

  [facility]



     250       5-17                               affected           ity of



     unit/gram      00:00:                               area(s)           [place]



     ointment      00                                 daily.           Medical



                                                                 Br[facility]

 

     collagenase      -0      Yes       [id number]           Apply to         

  [facility]



     250       5-17                               affected           ity of



     unit/gram      00:00:                               area(s)           [place]



     ointment      00                                 daily.           Medical



                                                                 Branch

 

     collagenase      -0      Yes       [id number]           Apply to         

  [facility]



     250       5-17                               affected           ity of



     unit/gram      00:00:                               area(s)           [place]



     ointment      00                                 daily.           Medical



                                                                 Br[facility]

 

     collagenase      -0      Yes       [id number]           Apply to         

  [facility]



     250       5-17                               affected           ity of



     unit/gram      00:00:                               area(s)           [place]



     ointment      00                                 daily.           Medical



                                                                 Br[facility]

 

     collagenase      -0      Yes       [id number]           Apply to         

  [facility]



     250       5-17                               affected           ity of



     unit/gram      00:00:                               area(s)           [place]



     ointment      00                                 daily.           Medical



                                                                 Branch

 

     collagenase      -0      Yes       [id number]           Apply to         

  [facility]



     250       5-17                               affected           ity of



     unit/gram      00:00:                               area(s)           [place]



     ointment      00                                 daily.           Medical



                                                                 Br[facility]

 

     collagenase      -0      Yes       [id number]           Apply to         

  [facility]



     250       5-17                               affected           ity of



     unit/gram      00:00:                               area(s)           [place]



     ointment      00                                 daily.           Medical



                                                                 Br[facility]

 

     collagenase      -0      Yes       [id number]           Apply to         

  [facility]



     250       5-17                               affected           ity of



     unit/gram      00:00:                               area(s)           [place]



     ointment      00                                 daily.           Medical



                                                                 Br[facility]

 

     collagenase      -0      Yes       [id number]           Apply to         

  [facility]



     250       5-17                               affected           ity of



     unit/gram      00:00:                               area(s)           [place]



     ointment      00                                 daily.           Medical



                                                                 Branch

 

     collagenase      -0      Yes       [id number]           Apply to         

  [facility]



     250       5-17                               affected           ity of



     unit/gram      00:00:                               area(s)           [place]



     ointment      00                                 daily.           Medical



                                                                 Branch

 

     collagenase      -0      Yes       [id number]           Apply to         

  [facility]



     250       5-17                               affected           ity of



     unit/gram      00:00:                               area(s)           [place]



     ointment      00                                 daily.           Medical



                                                                 Branch

 

     collagenase      -0      Yes       [id number]           Apply to         

  [facility]



     250       5-17                               affected           ity of



     unit/gram      00:00:                               area(s)           [place]



     ointment      00                                 daily.           Medical



                                                                 Branch

 

     collagenase      -0      Yes       [id number]           Apply to         

  [facility]



     250       5-17                               affected           ity of



     unit/gram      00:00:                               area(s)           [place]



     ointment      00                                 daily.           Medical



                                                                 Branch

 

     collagenase      -0      Yes       [id number]           Apply to         

  [facility]



     250       5-17                               affected           ity of



     unit/gram      00:00:                               area(s)           [place]



     ointment      00                                 daily.           Medical



                                                                 Branch

 

     collagenase      -0      Yes       [id number]           Apply to         

  [facility]



     250       5-17                               affected           ity of



     unit/gram      00:00:                               area(s)           [place]



     ointment      00                                 daily.           Medical



                                                                 Br[facility]

 

     collagenase      -0      Yes       [id number]           Apply to         

  [facility]



     250       5-17                               affected           ity of



     unit/gram      00:00:                               area(s)           [place]



     ointment      00                                 daily.           Medical



                                                                 Branch

 

     collagenase      -0      Yes       [id number]           Apply to         

  [facility]



     250       5-17                               affected           ity of



     unit/gram      00:00:                               area(s)           [place]



     ointment      00                                 daily.           Medical



                                                                 Branch

 

     collagenase      -0      Yes       [id number]           Apply to         

  [facility]



     250       5-17                               affected           ity of



     unit/gram      00:00:                               area(s)           [place]



     ointment      00                                 daily.           Medical



                                                                 Br[facility]

 

     collagenase      -0      Yes       [id number]           Apply to         

  [facility]



     250       5-17                               affected           ity of



     unit/gram      00:00:                               area(s)           [place]



     ointment      00                                 daily.           Medical



                                                                 Br[facility]

 

     collagenase      -0      Yes       [id number]           Apply to         

  [facility]



     250       5-17                               affected           ity of



     unit/gram      00:00:                               area(s)           [place]



     ointment      00                                 daily.           Medical



                                                                 Br[facility]

 

     collagenase      -0      Yes       [id number]           Apply to         

  [facility]



     250       5-17                               affected           ity of



     unit/gram      00:00:                               area(s)           [place]



     ointment      00                                 daily.           Medical



                                                                 Branch

 

     collagenase      -0      Yes       [id number]           Apply to         

  [facility]



     250       5-17                               affected           ity of



     unit/gram      00:00:                               area(s)           [place]



     ointment      00                                 daily.           Medical



                                                                 Br[facility]

 

     collagenase      -0      Yes       [id number]           Apply to         

  [facility]



     250       5-17                               affected           ity of



     unit/gram      00:00:                               area(s)           [place]



     ointment      00                                 daily.           Medical



                                                                 Br[facility]

 

     collagenase      -0      Yes       [id number]           Apply to         

  [facility]



     250       5-17                               affected           ity of



     unit/gram      00:00:                               area(s)           [place]



     ointment      00                                 daily.           Medical



                                                                 Branch

 

     collagenase      -0      Yes       [id number]           Apply to         

  [facility]



     250       5-17                               affected           ity of



     unit/gram      00:00:                               area(s)           [place]



     ointment      00                                 daily.           Medical



                                                                 Branch

 

     collagenase      -0      Yes       [id number]           Apply to         

  [facility]



     250       5-17                               affected           ity of



     unit/gram      00:00:                               area(s)           [place]



     ointment      00                                 daily.           Medical



                                                                 Branch

 

     collagenase      -0      Yes       [id number]           Apply to         

  [facility]



     250       5-17                               affected           ity of



     unit/gram      00:00:                               area(s)           [place]



     ointment      00                                 daily.           Medical



                                                                 Branch

 

     collagenase      -0      Yes       [id number]           Apply to         

  [facility]



     250       5-17                               affected           ity of



     unit/gram      00:00:                               area(s)           [place]



     ointment      00                                 daily.           Medical



                                                                 Branch

 

     collagenase      -0      Yes       [id number]           Apply to         

  [facility]



     250       5-17                               affected           ity of



     unit/gram      00:00:                               area(s)           [place]



     ointment      00                                 daily.           Medical



                                                                 Branch

 

     collagenase      -0      Yes       [id number]           Apply to         

  [facility]



     250       5-17                               affected           ity of



     unit/gram      00:00:                               area(s)           [place]



     ointment      00                                 daily.           Medical



                                                                 Branch

 

     collagenase      -0      Yes       [id number]           Apply to         

  [facility]



     250       5-17                               affected           ity of



     unit/gram      00:00:                               area(s)           [place]



     ointment      00                                 daily.           Medical



                                                                 Branch

 

     collagenase      -0      Yes       [id number]           Apply to         

  [facility]



     250       5-17                               affected           ity of



     unit/gram      00:00:                               area(s)           [place]



     ointment      00                                 daily.           Medical



                                                                 Branch

 

     collagenase      -0      Yes       [id number]           Apply to         

  [facility]



     250       5-17                               affected           ity of



     unit/gram      00:00:                               area(s)           [place]



     ointment      00                                 daily.           Medical



                                                                 Br[facility]

 

     collagenase      -0      Yes       [id number]           Apply to         

  [facility]



     250       5-17                               affected           ity of



     unit/gram      00:00:                               area(s)           [place]



     ointment      00                                 daily.           Medical



                                                                 Br[facility]

 

     collagenase      -0      Yes       [id number]           Apply to         

  [facility]



     250       5-17                               affected           ity of



     unit/gram      00:00:                               area(s)           [place]



     ointment      00                                 daily.           Medical



                                                                 Branch

 

     collagenase      -0      Yes       [id number]           [facility]         

  [facility]



     250       5-17                               affected           ity of



     unit/gram      00:00:                               area(s)           [place]



     ointment      00                                 daily.           [facility]



                                                                 [facility]

 

     collagenase      -0      Yes       [id number]           Apply to         

  [facility]



     250       5-17                               affected           ity of



     unit/gram      00:00:                               area(s)           [place]



     ointment      00                                 daily.           Medical



                                                                 Branch

 

     collagenase      -0      Yes       [id number]           Apply to         

  [facility]



     250       5-17                               affected           ity of



     unit/gram      00:00:                               area(s)           [place]



     ointment      00                                 daily.           Medical



                                                                 [facility]

 

     collagenase      -0      Yes       [id number]           Apply to         

  [facility]



     250       5-17                               affected           ity of



     unit/gram      00:00:                               area(s)           [place]



     ointment      00                                 daily.           Medical



                                                                 [facility]

 

     collagenase      -0      Yes       [id number]           Apply to         

  [facility]



     250       5-17                               affected           ity of



     unit/gram      00:00:                               area(s)           [place]



     ointment      00                                 daily.           Medical



                                                                 Branch

 

     collagenase      -0      Yes       [id number]           Apply to         

  [facility]



     250       5-17                               affected           ity of



     unit/gram      00:00:                               area(s)           [place]



     ointment      00                                 daily.           Medical



                                                                 [facility]

 

     collagenase      -0      Yes       [id number]           Apply to         

  [facility]



     250       5-17                               affected           ity of



     unit/gram      00:00:                               area(s)           [place]



     ointment      00                                 daily.           Medical



                                                                 Branch

 

     collagenase      -0      Yes       [id number]           Apply to         

  [facility]



     250       5-17                               affected           ity of



     unit/gram      00:00:                               area(s)           [place]



     ointment      00                                 daily.           Medical



                                                                 [facility]

 

     collagenase      -0      Yes       [id number]           Apply to         

  [facility]



     250       5-17                               affected           ity of



     unit/gram      00:00:                               area(s)           [place]



     ointment      00                                 daily.           Medical



                                                                 [facility]

 

     collagenase      -0      Yes       [id number]           Apply to         

  [facility]



     250       5-17                               affected           ity of



     unit/gram      00:00:                               area(s)           [place]



     ointment      00                                 daily.           Medical



                                                                 [facility]anch

 

     collagenase      -0      Yes       [id number]           Apply to         

  [facility]



     250       5-17                               affected           ity of



     unit/gram      00:00:                               area(s)           [place]



     ointment      00                                 daily.           Medical



                                                                 [facility]

 

     collagenase      -0      Yes       [id number]           Apply to         

  [facility]



     250       5-17                               affected           ity of



     unit/gram      00:00:                               area(s)           [place]



     ointment      00                                 daily.           Medical



                                                                 [facility]

 

     collagenase      -0      Yes       [id number]           Apply to         

  [facility]



     250       5-17                               affected           ity of



     unit/gram      00:00:                               area(s)           [place]



     ointment      00                                 daily.           Medical



                                                                 [facility]

 

     collagenase      -0      Yes       [id number]           Apply to         

  [facility]



     250       5-17                               affected           ity of



     unit/gram      00:00:                               area(s)           [place]



     ointment      00                                 daily.           Medical



                                                                 [facility]anch

 

     collagenase      -0      Yes       [id number]           Apply to         

  [facility]



     250       5-17                               affected           ity of



     unit/gram      00:00:                               area(s)           [place]



     ointment      00                                 daily.           Medical



                                                                 [facility]

 

     collagenase      -0      Yes       [id number]           Apply to         

  [facility]



     250       5-17                               affected           ity of



     unit/gram      00:00:                               area(s)           [place]



     ointment      00                                 daily.           Medical



                                                                 [facility]

 

     collagenase      -0      Yes       [id number]           Apply to         

  [facility]



     250       5-17                               affected           ity of



     unit/gram      00:00:                               area(s)           [place]



     ointment      00                                 daily.           Medical



                                                                 Branch

 

     collagenase      -0      Yes       [id number]           Apply to         

  [facility]



     250       5-17                               affected           ity of



     unit/gram      00:00:                               area(s)           [place]



     ointment      00                                 daily.           Medical



                                                                 [facility]

 

     collagenase      -0      Yes       [id number]           Apply to         

  [facility]



     250       5-17                               affected           ity of



     unit/gram      00:00:                               area(s)           [place]



     ointment      00                                 daily.           Medical



                                                                 [facility]

 

     collagenase      -0      Yes       [id number]           Apply to         

  [facility]



     250       5-17                               affected           ity of



     unit/gram      00:00:                               area(s)           [place]



     ointment      00                                 daily.           Medical



                                                                 [facility]

 

     collagenase      -0      Yes       [id number]           Apply to         

  [facility]



     250       5-17                               affected           ity of



     unit/gram      00:00:                               area(s)           [place]



     ointment      00                                 daily.           Medical



                                                                 Branch

 

     collagenase      -0      Yes       [id number]           Apply to         

  [facility]



     250       5-17                               affected           ity of



     unit/gram      00:00:                               area(s)           [place]



     ointment      00                                 daily.           Medical



                                                                 [facility]

 

     collagenase      -0      Yes       [id number]           Apply to         

  [facility]



     250       5-17                               affected           ity of



     unit/gram      00:00:                               area(s)           [place]



     ointment      00                                 daily.           Medical



                                                                 [facility]

 

     docusate      -2022- No        [id number] 100mg      Take 1           [facility]

[facility]



     100 mg      --                          capsule by           ity of



     capsule      00:00: 04:59                          mouth 2           [place]



               00   :00                           (two)           [facility]



                                                  times           [facility]



                                                  daily for           



                                                  30 days.           

 

     lactobacill      2022- No        [id number] .5mg      Take 1          

 [facility]



     us        5-17 06-17                          tablet by           ity of



     acidophilus      00:00: 04:59                          mouth 2           [place]

[place]



               00   :00                           (two)           [facility]



                                                  times           [facility]



                                                  daily for           



                                                  30 days.           

 

     sennosides      2022- No        [id number] 8.6mg      Take 1          

 [facility]



     8.6 mg      5-17 06-17                          tablet by           ity of



     tablet      00:00: 04:59                          mouth 2           [place]



               00   :00                           (two)           Medical



                                                  times           [facility]



                                                  daily for           



                                                  30 days.           

 

     tamsulosin      2022- No        [id number] .4mg      Take 1           

[facility]



     0.4 mg 24      5-17 06-17                          capsule by           ity

 of



     hr capsule      00:00: 04:59                          mouth           [place]



               00   :00                           daily for           Medical



                                                  30 days.           [facility]anch

 

     docusate      2022- No        [id number] 100mg      Take 1           [facility]

[facility]



     100 mg      5-17 06-17                          capsule by           ity of



     capsule      00:00: 04:59                          mouth 2           [place]



               00   :00                           (two)           Medical



                                                  times           [place]



                                                  daily for           



                                                  30 days.           

 

     lactobacill      2022- No        [id number] .5mg      Take 1          

 [facility]



     us        5-17 06-17                          tablet by           ity of



     acidophilus      00:00: 04:59                          mouth 2           [facility]

[place]



               00   :00                           (two)           [facility]



                                                  times           [place]



                                                  daily for           



                                                  30 days.           

 

     sennosides      2022- No        [id number] 8.6mg      Take 1          

 [facility]



     8.6 mg      5-17 06-17                          tablet by           ity of



     tablet      00:00: 04:59                          mouth 2           [place]



               00   :00                           (two)           Medical



                                                  times           [place]



                                                  daily for           



                                                  30 days.           

 

     tamsulosin      2022- No        [id number] .4mg      Take 1           

[facility]



     0.4 mg 24      5-17 06-17                          capsule by           ity

 of



     hr capsule      00:00: 04:59                          mouth           [place]



               00   :00                           daily for           Medical



                                                  30 days.           [facility]

 

     docusate      2022- No        [id number] 100mg      Take 1           [facility]

[facility]



     100 mg      5-17 06-17                          capsule by           ity of



     capsule      00:00: 04:59                          mouth 2           [place]



               00   :00                           (two)           Medical



                                                  times           [facility]



                                                  daily for           



                                                  30 days.           

 

     lactobacill      2022- No        [id number] .5mg      Take 1          

 [facility]



     us        5-17 06-17                          tablet by           ity of



     acidophilus      00:00: 04:59                          mouth 2           [facility]

[place]



               00   :00                           (two)           [facility]



                                                  times           [facility]



                                                  daily for           



                                                  30 days.           

 

     sennosides      2022- No        [id number] 8.6mg      Take 1          

 [facility]



     8.6 mg      5-17 06-17                          tablet by           ity of



     tablet      00:00: 04:59                          mouth 2           [place]



               00   :00                           (two)           Medical



                                                  times           [facility]



                                                  daily for           



                                                  30 days.           

 

     tamsulosin      2022- No        [id number] .4mg      Take 1           

[facility]



     0.4 mg 24      5-17 06-17                          capsule by           ity

 of



     hr capsule      00:00: 04:59                          mouth           [place]



               00   :00                           daily for           Medical



                                                  30 days.           [facility]

 

     docusate      2022- No        [id number] 100mg      Take 1           [facility]

[facility]



     100 mg      5-17 06-17                          capsule by           ity of



     capsule      00:00: 04:59                          mouth 2           [place]



               00   :00                           (two)           Medical



                                                  times           [facility]



                                                  daily for           



                                                  30 days.           

 

     lactobacill      2022- No        [id number] .5mg      Take 1          

 [facility]



     us        5-17 06-17                          tablet by           ity of



     acidophilus      00:00: 04:59                          mouth 2           [facility]

[place]



               00   :00                           (two)           [facility]



                                                  times           [facility]



                                                  daily for           



                                                  30 days.           

 

     sennosides      2022- No        [id number] 8.6mg      Take 1          

 [facility]



     8.6 mg      5-17 06-17                          tablet by           ity of



     tablet      00:00: 04:59                          mouth 2           [place]



               00   :00                           (two)           Medical



                                                  times           [facility]



                                                  daily for           



                                                  30 days.           

 

     tamsulosin      2022- No        [id number] .4mg      Take 1           

[facility]



     0.4 mg 24      5-17 06-17                          capsule by           ity

 of



     hr capsule      00:00: 04:59                          mouth           [place]



               00   :00                           daily for           Medical



                                                  30 days.           [facility]anch

 

     docusate      2022- No        [id number] 100mg      Take 1           [facility]

[facility]



     100 mg      5-17 06-17                          capsule by           ity of



     capsule      00:00: 04:59                          mouth 2           [place]



               00   :00                           (two)           Medical



                                                  times           [facility]



                                                  daily for           



                                                  30 days.           

 

     lactobacill      2022- No        [id number] .5mg      Take 1          

 [facility]



     us        5-17 06-17                          tablet by           ity of



     acidophilus      00:00: 04:59                          mouth 2           [facility]

[place]



               00   :00                           (two)           [facility]



                                                  times           [facility]



                                                  daily for           



                                                  30 days.           

 

     sennosides      2022- No        [id number] 8.6mg      Take 1          

 [facility]



     8.6 mg      -17                          tablet by           ity of



     tablet      00:00: 04:59                          mouth 2           [place]



               00   :00                           (two)           Medical



                                                  times           [facility]



                                                  daily for           



                                                  30 days.           

 

     tamsulosin      2022- No        [id number] .4mg      Take 1           

[facility]



     0.4 mg 24      -                          capsule by           ity

 of



     hr capsule      00:00: 04:59                          mouth           [place]



               00   :00                           daily for           Medical



                                                  30 days.           [facility]anch

 

     metroNIDAZO      2022- No        [id number] 500mg      Take 1         

  [facility]



      mg      -                          tablet by           ity 

of



     tablet      00:00: 04:59                          mouth           [place]



               00   :00                           every 12           Medical



                                                  (twelve)           [facility]



                                                  hours for           



                                                  3 days.           

 

     metroNIDAZO      2022- No        [id number] 500mg      Take 1         

  [facility]



      mg      -                          tablet by           ity 

of



     tablet      00:00: 04:59                          mouth           [place]



               00   :00                           every 12           Medical



                                                  (twelve)           [facility]



                                                  hours for           



                                                  3 days.           

 

     metroNIDAZO      2022- No             500mg      500 mg,           [facility]

[facility]



     LE (FLAGYL)      5-15 05-20                          Oral, Q12H           i

ty of



     tablet 500      22:00: 21:59                          ABX, 10           [facility]

[facility]



     mg        00   :00                           doses,           Medical



                                                  First dose           [facility]



                                                  on Sun           



                                                  5/15/22 at           



                                                  1700, Last           



                                                  dose on           



                                                  22 at           



                                                  0500,           



                                                  Routine<br           



                                                  >Reason           



                                                  for            



                                                  Anti-Infec           



                                                  tive:           



                                                  Documented           



                                                  Infection<           



                                                  br>Documen           



                                                  huma            



                                                  Infection           



                                                  Site: Skin           



                                                  / Soft           



                                                  Tissue<br>           



                                                  Duration           



                                                  of             



                                                  Therapy:           



                                                  Other (see           



                                                  Comments)           

 

     cefTRIAXone      2022- No             2g        2 g, IV           [facility]

[facility]



     (ROCEPHIN)      5-15 05-17                          Piggyback,           it

y of



     2 g in NaCl      20:00: 20:03                          Q24H ABX,           

[place]



     0.9% (NS)      00   :21                           3 doses,           [facility]

l



     100 mL                                         First dose           [facility]



     MINI-BAG                                         on Sun           



                                                  5/15/22 at           



                                                  1500, Last           



                                                  dose on           



                                                  22 at           



                                                  1500,           



                                                  Administer           



                                                  over 30           



                                                  Minutes,           



                                                  100            



                                                  mL<br>Reas           



                                                  on for           



                                                  Anti-Infec           



                                                  tive:           



                                                  Documented           



                                                  Infection<           



                                                  br>Documen           



                                                  huma            



                                                  Infection           



                                                  Site: Skin           



                                                  / Soft           



                                                  Tissue<br>           



                                                  Duration           



                                                  of             



                                                  Therapy:           



                                                  Other (see           



                                                  Comments)           

 

     enoxaparin            Yes            30mg      30 mg,           Unive

rs



     (LOVENOX)                                     Subcutaneo           ity 

of



     injection      01:00:                               us, Q12H,           Eric

as



     30 mg      00                                 First dose           Medical



                                                  on Wed           Branch



                                                  22 at           



                                                  2000,           



                                                  Until           



                                                  Discontinu           



                                                  ed,            



                                                  Routine           

 

     proMETHazin            Yes            25mg      25 mg, IV           U

nivers



     e                                        Piggyback,           ity of



     (PHENERGAN)      22:58:                               Q4HPRN,           Eric

as



     25 mg in      13                                 Starting           Medical



     NaCl 0.9%                                         on Wed           Branch



     (NS) 50 mL                                         22 at           



     IV                                           1758,           



     piggyback                                         Until           



                                                  Discontinu           



                                                  ed,            



                                                  Routine,           



                                                  Nausea and           



                                                  Vomiting           



                                                  (N/V)           

 

     collagenase            Yes                      Topical           Uni

vers



     (SANTYL)                                     (Apply To           ity of



     ointment      22:15:                               Affected           [place]



               00                                 Areas),           Medical



                                                  DAILY,           Branch



                                                  First dose           



                                                  on 22 at           



                                                  1715,           



                                                  Until           



                                                  Discontinu           



                                                  ed,            



                                                  Routine           

 

     HYDROmorpho      2022- No             1mg       1 mg, Slow          

 [facility]



     ne                                  IV Push,           ity of



     (DILAUDID)      21:45: 21:25                          ONCE, 1           Eric

as



     injection 1      00   :00                           dose, On           Medi

sarah



     mg                                           22 at           



                                                  1645,           



                                                  Routine<br           



                                                  >Use           



                                                  approved           



                                                  by             



                                                  (Faculty):           



                                                  ADC            



                                                  PROVIDER           

 

     magnesium      2022- No             2g        2 g, IV           Univ

ers



     sulfate in                                Piggyback,           it

y of



     water 2      15:15: 16:11                          Administer           Eric

as



     gram/50 mL      00   :00                           over 60           Medica

l



     (4 %)                                         Minutes,           Branch



     infusion 2                                         ONCE, 1           



     g                                            dose, On           



                                                  22 at           



                                                  1015,           



                                                  Routine           

 

     lactulose            Yes            15mL      15 mL,           Univer

s



     (CEPHULAC)                                     Oral,           ity of



     solution 15      14:00:                               DAILY,           [place]

[place]



     mL        00                                 First dose           Medical



                                                  on Wed           [facility]anch



                                                  22 at           



                                                  0900,           



                                                  Until           



                                                  Discontinu           



                                                  ed,            



                                                  Routine           

 

     vancomycin      2022- No             125mg      125 mg,           Un

[facility]elyn



     (FIRVANQ)       05-16                          Oral,           ity of



     50 mg/mL      14:00: 16:27                          Q24H,           [place]



     oral      00   :02                           First dose           Medical



     solution                                         on Wed           Branch



     125 mg                                         22 at           



                                                  0900,           



                                                  Until           



                                                  Discontinu           



                                                  ed,            



                                                  Routine<br           



                                                  >Reason           



                                                  for            



                                                  Anti-Infec           



                                                  tive:           



                                                  [facility]           



                                                  Non-Surgic           



                                                  al             



                                                  Prophylaxi           



                                                  s<br>Durat           



                                                  ion of           



                                                  therapy: 7           



                                                  days           

 

     sennosides      0      Yes            8.6mg      8.6 mg,           Uni

vers



     (SENOKOT)                                     Oral, BID,           ity 

of



     tablet 8.6      13:00:                               First dose           T

[facility]



     mg        00                                 on Wed           Medical



                                                  22 at           Branch



                                                  0800,           



                                                  Until           



                                                  Discontinu           



                                                  ed,            



                                                  Routine           

 

     docusate            Yes            100mg      100 mg,           Unive

rs



     (COLACE)                                     Oral, BID,           ity o

f



     capsule 100      13:00:                               First dose           

[place]



     mg        00                                 on Wed           Medical



                                                  22 at           Branch



                                                  0800,           



                                                  Until           



                                                  Discontinu           



                                                  ed,            



                                                  Routine           

 

     HYDROmorpho            Yes            2mg       2 mg,           Unive

rs



     ne                                       Oral, TID,           ity of



     (DILAUDID)      13:00:                               First dose           T

exas



     tablet 2 mg      00                                 on Wed           Medica

l



                                                  22 at           Branch



                                                  0800,           



                                                  Until           



                                                  Discontinu           



                                                  ed             

 

     lactobacill            Yes            .5mg      0.5 mg,           Uni

vers



     us                                       Oral, BID,           ity of



     acidophilus      13:00:                               First dose           

[place]



     tablet 0.5      00                                 on Wed           Medical



     mg                                           22 at           Branch



                                                  0800,           



                                                  Until           



                                                  Discontinu           



                                                  ed,            



                                                  Routine           

 

     ceFEPIme      2022- No             2g        2 g, IV           Unive

rs



     (MAXIPIME)       05-15                          Piggyback,           it

y of



     2 g in NaCl      11:00: 19:23                          Q8H ABX,           T

exas



     0.9% (NS)      00   :40                           First dose           Medi

sarah



     100 mL                                         on Wed           Branch



     MINI-BAG                                         22 at           



                                                  0600,           



                                                  Until           



                                                  Discontinu           



                                                  ed,            



                                                  Administer           



                                                  over 30           



                                                  Minutes,           



                                                  100            



                                                  mL<br>Reas           



                                                  on for           



                                                  Anti-Infec           



                                                  tive:           



                                                  Emp[facility]ic           



                                                  Therapy           



                                                  for            



                                                  Suspected           



                                                  Infection<           



                                                  br&gt;Empi           



                                                  alda            



                                                  Therapy           



                                                  Site:           



                                                  Bone<br>Du           



                                                  ration of           



                                                  therapy:           



                                                  72 hours           

 

     NaCl 0.9%      2022-0 2022- No             1000mL      at 50           Univ

ers



     (NS) IV       05-16                          mL/hr, IV           ity of



     infusion      10:00: 16:41                          Infusion,           Eric

as



     1,000 mL      00   :28                           CONTINUOUS           Medic

al



                                                  , Starting           Branch



                                                  on 22 at           



                                                  0500,           



                                                  Until Mon           



                                                  22 at           



                                                  1141,           



                                                  Routine           

 

     doxycycline       No             100mg      100 mg, IV         

  [facility]



     (VIBRAMYCIN      14                          Piggyback,           i

ty of



     ) 100 mg in      09:15: 23:11                          Q12H ABX,           

[place]



     NaCl 0.9%      00   :48                           First dose           Medi

sarah



     (NS) 100 mL                                         on Wed           Branch



     MINI-BAG                                         22 at           



                                                  0415,           



                                                  Until           



                                                  Discontinu           



                                                  ed,            



                                                  Administer           



                                                  over 60           



                                                  Minutes,           



                                                  100            



                                                  mL<br>Reas           



                                                  on for           



                                                  Anti-Infec           



                                                  tive:           



                                                  Empiric           



                                                  Therapy           



                                                  for            



                                                  Suspected           



                                                  Infection<           



                                                  br>Empiric           



                                                  Therapy           



                                                  Site:           



                                                  Wound<br>D           



                                                  uration of           



                                                  therapy: 7           



                                                  days           

 

     tamsulosin            Yes            .4mg      0.4 mg,           Univ

ers



     (FLOMAX)                                     Oral,           ity of



     capsule 0.4      08:30:                               DAILY,           [place]

[place]



     mg        00                                 First dose           Medical



                                                  on Wed           Branch



                                                  22 at           



                                                  0330,           



                                                  Until           



                                                  Discontinu           



                                                  ed,            



                                                  Routine           

 

     HYDROmorpho            Yes            1mg       1 mg, Slow           

[facility]



     ne                                       IV Push,           ity of



     (DILAUDID)      06:04:                               Q4HPRN,           [place]

[facility]



     injection 1      07                                 Starting           Medi

sarah



     mg                                           on 22 at           



                                                  0104,           



                                                  Until           



                                                  Discontinu           



                                                  ed,            



                                                  Routine,           



                                                  Pain           



                                                  (scale           



                                                  7-10)<br>U           



                                                  se             



                                                  approved           



                                                  by             



                                                  (Faculty):           



                                                  ADC            



                                                  PROVIDER           

 

     FENTanyl PF       No             50ug      50 mcg,           Un

[facility]



     (SUBLIMAZE                                Slow IV           ity o

f



     (PF))      05:30: 05:01                          Push,           [place]



     injection      00   :00                           ONCE, 1           Medical



     50 mcg                                         dose, On           Branch



                                                  22 at           



                                                  0030, STAT           

 

     iopamidol      2022- No        [id number] 150mL      150 mL,         

  [facility]



     (ISOVUE                                Intravenou           ity o

f



     370-500 mL)      04:45: 03:34                          s, ONCE, 1          

 [place]



     injection      00   :00                           dose, On           Medica

l



     150 mL                                         e            [place]



                                                  5/10/22 at           



                                                  2345,           



                                                  Routine           

 

     ceFEPIme      2022- No             2000mg      2,000 mg,           U

nivers



     (MAXIPIME)                                IV             ity of



     injection      04:15: 04:15                          Piggyback,           T

exas



     2,000 mg      00   :00                           ONCE, 1           Medical



                                                  dose, On           Branch



                                                  Tue            



                                                  5/10/22 at           



                                                  2315,           



                                                  STAT<br>Re           



                                                  ason for           



                                                  Anti-Infec           



                                                  tive:           



                                                  Emp[facility]ic           



                                                  Therapy           



                                                  for            



                                                  Suspected           



                                                  Infection<           



                                                  br>Empiric           



                                                  Therapy           



                                                  Site:           



                                                  Bone<br>Du           



                                                  ration of           



                                                  therapy:           



                                                  72 hours           

 

     FENTanyl PF      2022- No             75ug      75 mcg,           Un

yoselyn



     (SUBLIMAZE                                Slow IV           ity o

f



     (PF))      03:45: 02:47                          Push,           [place]



     injection      00   :00                           ONCE, 1           Medical



     75 mcg                                         dose, On           Branch



                                                  Tue            



                                                  5/10/22 at           



                                                  2245, STAT           

 

     OXYCODONE       No                       Take by           [facility]

ers



     HCL/ACETAMI                                mouth.           ity o

f



     NOPHEN      03:22: 00:00                                         [place]



     (PERCOCET      50   :00                                          Medical



     ORAL)                                                        Branch

 

     FENTanyl PF       No             100ug      100 mcg,           

[facility]



     (SUBLIMAZE                                Slow IV           ity o

f



     (PF))      01:30: 01:12                          Push,           [place]



     injection      00   :00                           ONCE, 1           Medical



     100 mcg                                         dose, On           Branch



                                                  Tue            



                                                  5/10/22 at           



                                                  2030, STAT           

 

     Lovenox            No                       Notes:           [facility]



               4-13                               (Same as:           l



               17:00:                               Lovenox)           [facility]



                                                               

 

     Lovenox            No                       Notes:           [facility]



               4-13                               (Same as:           l



               17:00:                               Lovenox)           [facility]                                                

 

     Lovenox            No                       Notes:           [facility]



               4-13                               (Same as:           l



               17:00:                               Lovenox)           [place]



                                                               

 

     Lovenox      0      No                       Notes:           [facility]



               4-13                               (Same as:           l



               17:00:                               Lovenox)           [facility]                                                

 

     Lovenox            No                       Notes:           [facility]



               4-13                               (Same as:           l



               17:00:                               Lovenox)           [facility]



                                                               

 

     Lovenox            No                       Notes:           [facility]



               4-13                               (Same as:           l



               17:00:                               Lovenox)           [facility]                                                

 

     promethazin            No                       Notes:           [facility]

raje[facility]



     e         4-13                               (Same as:           l



               16:47:                               Phenergan)           [place]                                                

 

     promethazin            No                       Notes:           [facility]

[facility]



     e         4-13                               (Same as:           l



               16:47:                               Phenergan)           [place]                                                

 

     promethazin            No                       Notes:           [facility]

[facility]



     e         4-13                               (Same as:           l



               16:47:                               Phenergan)           [place]                                                

 

     promethazin            No                       Notes:           [facility]

[facility]



     e         4-13                               (Same as:           l



               16:47:                               Phenergan)           [facility]                                                

 

     promethazin            No                       Notes:           [facility]

[facility]



     e         4-13                               (Same as:           l



               16:47:                               Phenergan)           [place]                                                

 

     promethazin            No                       Notes:           [facility]

ra[facility]



     e         4-13                               (Same as:           l



               16:47:                               Phenergan)           [place]                                                

 

     albuterol            No                       Notes: SEE           [facility]

[facility]



     0.083%      4-13                               RT             l



     inhalation      16:46:                               DOCUMENTAT           [facility]

[facility]



     solution      00                                 ION (Same           



                                                  as:            



                                                  Proventil)           

 

     albuterol            No                       Notes: SEE           Me

[facility]



     0.083%      4-13                               RT             l



     inhalation      16:46:                               DOCUMENTAT           [facility]

[facility]



     solution      00                                 ION (Same           



                                                  as:            



                                                  Proventil)           

 

     albuterol            No                       Notes: SEE           Me

[facility]



     0.083%      4-13                               RT             l



     inhalation      16:46:                               DOCUMENTAT           [facility]

[facility]



     solution      00                                 ION (Same           



                                                  as:            



                                                  Proventil)           

 

     albuterol            No                       Notes: SEE           Me

[facility]



     0.083%      4-13                               RT             l



     inhalation      16:46:                               DOCUMENTAT           [facility]

[facility]



     solution      00                                 ION (Same           



                                                  as:            



                                                  Proventil)           

 

     albuterol            No                       Notes: SEE           Me

[facility]



     0.083%      4-13                               RT             l



     inhalation      16:46:                               DOCUMENTAT           [facility]

[facility]



     solution      00                                 ION (Same           



                                                  as:            



                                                  Proventil)           

 

     albuterol            No                       Notes: SEE           Me

[facility]



     0.083%      4-13                               RT             l



     inhalation      16:46:                               DOCUMENTAT           [facility]

[facility]



     solution      00                                 ION (Same           



                                                  as:            



                                                  Proventil)           

 

     hydromorpho            Yes                      4 mg = 1           Me

[facility]



     ne 4 mg      4-13                               tab, PO,           l



     oral tablet      16:43:                               Q12H, 0           Her

child



               00                                 Refill(s)           

 

     hydromorpho      2022-0      Yes                      4 mg = 1           Me

[facility]



     ne 4 mg      4-13                               tab, PO,           l



     oral tablet      16:43:                               Q12H, 0           Her

child



               00                                 Refill(s)           

 

     hydromorpho      2022-0      Yes                      4 mg = 1           Me

[facility]



     ne 4 mg      4-13                               tab, PO,           l



     oral tablet      16:43:                               Q12H, 0           Her

child



               00                                 Refill(s)           

 

     hydromorpho      2022-0      Yes                      4 mg = 1           Me

[facility]



     ne 4 mg      4-13                               tab, PO,           l



     oral tablet      16:43:                               Q12H, 0           Her

child



               00                                 Refill(s)           

 

     hydromorpho      2-0      Yes                      4 mg = 1           Me

[facility]



     ne 4 mg      4-13                               tab, PO,           l



     oral tablet      16:43:                               Q12H, 0           Her

child



               00                                 Refill(s)           

 

     hydromorpho      2-0      Yes                      4 mg = 1           Me

[facility]



     ne 4 mg      4-13                               tab, PO,           l



     oral tablet      16:43:                               Q12H, 0           Her

child



               00                                 Refill(s)           

 

     Lantus 100      -0      No                       10 unit,           [facility]

[facility]



     units/mL      4-13                               SUB-Q,           l



               16:40:                               Daily, 0           [facility]



               00                                 Refill(s)           

 

     Lantus 100      -0      No                       10 unit,           [facility]

[facility]



     units/mL      4-13                               SUB-Q,           l



               16:40:                               Daily, 0           [facility]



               00                                 Refill(s)           

 

     Lantus 100      -0      No                       10 unit,           [facility]

[facility]



     units/mL      4-13                               SUB-Q,           l



               16:40:                               Daily, 0           [place]



               00                                 Refill(s)           

 

     Lantus 100      -0      No                       10 unit,           [facility]

[facility]



     units/mL      4-13                               SUB-Q,           l



               16:40:                               Daily, 0           [facility]



               00                                 Refill(s)           

 

     Lantus 100      -0      No                       10 unit,           [facility]

[facility]



     units/mL      4-13                               SUB-Q,           l



               16:40:                               Daily, 0           [place]



               00                                 Refill(s)           

 

     Lantus 100      -0      No                       10 unit,           [facility]

[facility]



     units/mL      4-13                               SUB-Q,           l



               16:40:                               Daily, 0           [facility]



               00                                 Refill(s)           

 

     Lantus 100      2022-0      No                       10 unit,           [facility]

[facility]



     units/mL      4-12                               0.1 mL,           l



               14:00:                               Route:                                            SUB-Q,           



                                                  Drug form:           



                                                  SOLN,           



                                                  Daily,           



                                                  Dosing           



                                                  Weight           



                                                  127.727,           



                                                  kg, Start           



                                                  date:           



                                                  22           



                                                  9:00:00           



                                                  CDT,           



                                                  Duration:           



                                                  30 day,           



                                                  Stop date:           



                                                  22           



                                                  9:00:00           



                                                  CDT,           



                                                  Infuse           



                                                  over: 0           



                                                  hr, 0           

 

     Lantus 100      2022-0      No                       10 unit,           [facility]

[facility]



     units/mL      4-12                               0.1 mL,           l



               14:00:                               Route:                                            SUB-Q,           



                                                  Drug form:           



                                                  SOLN,           



                                                  Daily,           



                                                  Dosing           



                                                  Weight           



                                                  127.727,           



                                                  kg, Start           



                                                  date:           



                                                  22           



                                                  9:00:00           



                                                  CDT,           



                                                  Duration:           



                                                  30 day,           



                                                  Stop date:           



                                                  22           



                                                  9:00:00           



                                                  CDT,           



                                                  Infuse           



                                                  over: 0           



                                                  hr, 0           

 

     Lantus 100      2-0      No                       10 unit,           [facility]

[facility]



     units/mL      4-12                               0.1 mL,           l



               14:00:                               Route:                                            SUB-Q,           



                                                  Drug form:           



                                                  SOLN,           



                                                  Daily,           



                                                  Dosing           



                                                  Weight           



                                                  127.727,           



                                                  kg, Start           



                                                  date:           



                                                  22           



                                                  9:00:00           



                                                  CDT,           



                                                  Duration:           



                                                  30 day,           



                                                  Stop date:           



                                                  22           



                                                  9:00:00           



                                                  CDT,           



                                                  Infuse           



                                                  over: 0           



                                                  hr, 0           

 

     Lantus 100      2-0      No                       10 unit,           [facility]

[facility]



     units/mL      4-12                               0.1 mL,           l



               14:00:                               Route:                                            SUB-Q,           



                                                  Drug form:           



                                                  SOLN,           



                                                  Daily,           



                                                  Dosing           



                                                  Weight           



                                                  127.727,           



                                                  kg, Start           



                                                  date:           



                                                  22           



                                                  9:00:00           



                                                  CDT,           



                                                  Duration:           



                                                  30 day,           



                                                  Stop date:           



                                                  22           



                                                  9:00:00           



                                                  CDT,           



                                                  Infuse           



                                                  over: 0           



                                                  hr, 0           

 

     Lantus 100      2-0      No                       10 unit,           [facility]

[facility]



     units/mL      4-12                               0.1 mL,           l



               14:00:                               Route:           [facility]                                 SUB-Q,           



                                                  Drug form:           



                                                  SOLN,           



                                                  Daily,           



                                                  Dosing           



                                                  Weight           



                                                  127.727,           



                                                  kg, Start           



                                                  date:           



                                                  22           



                                                  9:00:00           



                                                  CDT,           



                                                  Duration:           



                                                  30 day,           



                                                  Stop date:           



                                                  22           



                                                  9:00:00           



                                                  CDT,           



                                                  Infuse           



                                                  over: 0           



                                                  hr, 0           

 

     Lantus 100      2-0      No                       10 unit,           [facility]

[facility]



     units/mL      4-12                               0.1 mL,           l



               14:00:                               Route:           [place]



               00                                 SUB-Q,           



                                                  Drug form:           



                                                  SOLN,           



                                                  Daily,           



                                                  Dosing           



                                                  Weight           



                                                  127.727,           



                                                  kg, Start           



                                                  date:           



                                                  22           



                                                  9:00:00           



                                                  CDT,           



                                                  Duration:           



                                                  30 day,           



                                                  Stop date:           



                                                  22           



                                                  9:00:00           



                                                  CDT,           



                                                  Infuse           



                                                  over: 0           



                                                  hr, 0           

 

     cefepime +      -0      No                       Notes:           [facility]

[facility]



     sterile      4-12                               (Same As:           l



     water 10 mL      06:00:                               Maxipime)           [facility]

[facility]



               00                                 ***            



                                                  MEDICATION           



                                                  WASTE ***           



                                                  Product           



                                                  Size: 1000           



                                                  mg Product           



                                                  Wasted:           



                                                  _0__ mg           

 

     cefepime +      -0      No                       Notes:           [facility]

[facility]



     sterile      4-12                               (Same As:           l



     water 10 mL      06:00:                               Maxipime)           [facility]

[facility]



               00                                 ***            



                                                  MEDICATION           



                                                  WASTE ***           



                                                  Product           



                                                  Size: 1000           



                                                  mg Product           



                                                  Wasted:           



                                                  _0__ mg           

 

     cefepime +      -0      No                       Notes:           [facility]

[facility]



     sterile      4-12                               (Same As:           l



     water 10 mL      06:00:                               Maxipime)           [facility]

[facility]



               00                                 ***            



                                                  MEDICATION           



                                                  WASTE ***           



                                                  Product           



                                                  Size: 1000           



                                                  mg Product           



                                                  Wasted:           



                                                  _0__ mg           

 

     cefepime +      -0      No                       Notes:           [facility]

[facility]



     sterile      4-12                               (Same As:           l



     water 10 mL      06:00:                               Maxipime)           [facility]

[facility]



               00                                 ***            



                                                  MEDICATION           



                                                  WASTE ***           



                                                  Product           



                                                  Size: 1000           



                                                  mg Product           



                                                  Wasted:           



                                                  _0__ mg           

 

     cefepime +      2-0      No                       Notes:           [facility]

[facility]



     sterile      4-12                               (Same As:           l



     water 10 mL      06:00:                               Maxipime)           [facility]

[facility]



               00                                 ***            



                                                  MEDICATION           



                                                  WASTE ***           



                                                  Product           



                                                  Size: 1000           



                                                  mg Product           



                                                  Wasted:           



                                                  _0__ mg           

 

     cefepime +      2-0      No                       Notes:           [facility]

[facility]



     sterile      4-12                               (Same As:           l



     water 10 mL      06:00:                               Maxipime)           [facility]

[facility]



               00                                 ***            



                                                  MEDICATION           



                                                  WASTE ***           



                                                  Product           



                                                  Size: 1000           



                                                  mg Product           



                                                  Wasted:           



                                                  _0__ mg           

 

     cefepime +      2-0      No                       Notes:           [facility]

[facility]



     sterile      4-12                               (Same As:           l



     water 10 mL      05:00:                               Maxipime)           [facility]

[facility]



               00                                 ***            



                                                  MEDICATION           



                                                  WASTE ***           



                                                  Product           



                                                  Size: 1000           



                                                  mg Product           



                                                  Wasted:           



                                                  _0__ mg           

 

     insulin            No                       Notes:           [facility]



     lispro      4-12                               (Same as:           l



               05:00:                               Humalog)           [place]



               00                                 Roll in           



                                                  palms of           



                                                  hands           



                                                  gently; Do           



                                                  not shake           



                                                  vigorously           



                                                  . WASTE:           



                                                  F/P -           



                                                  Black; E -           



                                                  Municipal           



                                                  Trash Bin           



                                                  Stable for           



                                                  28 days at           



                                                  room           



                                                  temperatur           



                                                  e. Expires           



                                                  in _____           



                                                  days from           



                                                  __________           



                                                  ____Date           

 

     cefepime +            No                       Notes:           [facility]

[facility]



     sterile      4-12                               (Same As:           l



     water 10 mL      05:00:                               Maxipime)           [facility]

[facility]



               00                                 ***            



                                                  MEDICATION           



                                                  WASTE ***           



                                                  Product           



                                                  Size: 1000           



                                                  mg Product           



                                                  Wasted:           



                                                  _0__ mg           

 

     insulin            No                       Notes:           [facility]



     lispro      4-12                               (Same as:           l



               05:00:                               Humalog)           [place]



               00                                 Roll in           



                                                  palms of           



                                                  hands           



                                                  gently; Do           



                                                  not shake           



                                                  vigorously           



                                                  . WASTE:           



                                                  F/P -           



                                                  Black; E -           



                                                  Municipal           



                                                  Trash Bin           



                                                  Stable for           



                                                  28 days at           



                                                  room           



                                                  temperatur           



                                                  e. Expires           



                                                  in _____           



                                                  days from           



                                                  __________           



                                                  ____Date           

 

     cefepime +            No                       Notes:           [facility]

[facility]



     sterile      4-12                               (Same As:           l



     water 10 mL      05:00:                               Maxipime)           [facility]

[facility]



               00                                 ***            



                                                  MEDICATION           



                                                  WASTE ***           



                                                  Product           



                                                  Size: 1000           



                                                  mg Product           



                                                  Wasted:           



                                                  _0__ mg           

 

     insulin            No                       Notes:           [facility]



     lispro      4-12                               (Same as:           l



               05:00:                               Humalog)           [place]



               00                                 Roll in           



                                                  palms of           



                                                  hands           



                                                  gently; Do           



                                                  not shake           



                                                  vigorously           



                                                  . WASTE:           



                                                  F/P -           



                                                  Black; E -           



                                                  Municipal           



                                                  Trash Bin           



                                                  Stable for           



                                                  28 days at           



                                                  room           



                                                  temperatur           



                                                  e. Expires           



                                                  in _____           



                                                  days from           



                                                  __________           



                                                  ____Date           

 

     cefepime +            No                       Notes:           [facility]

[facility]



     sterile      4-12                               (Same As:           l



     water 10 mL      05:00:                               Maxipime)           [facility]

[facility]



               00                                 ***            



                                                  MEDICATION           



                                                  WASTE ***           



                                                  Product           



                                                  Size: 1000           



                                                  mg Product           



                                                  Wasted:           



                                                  _0__ mg           

 

     insulin            No                       Notes:           [facility]



     lispro      4-12                               (Same as:           l



               05:00:                               Humalog)           [facility]



               00                                 Roll in           



                                                  palms of           



                                                  hands           



                                                  gently; Do           



                                                  not shake           



                                                  vigorously           



                                                  . WASTE:           



                                                  F/P -           



                                                  Black; E -           



                                                  Municipal           



                                                  Trash Bin           



                                                  Stable for           



                                                  28 days at           



                                                  room           



                                                  temperatur           



                                                  e. Expires           



                                                  in _____           



                                                  days from           



                                                  __________           



                                                  ____Date           

 

     cefepime +            No                       Notes:           [facility]

[facility]



     sterile      4-12                               (Same As:           l



     water 10 mL      05:00:                               Maxipime)           [facility]

[facility]



               00                                 ***            



                                                  MEDICATION           



                                                  WASTE ***           



                                                  Product           



                                                  Size: 1000           



                                                  mg Product           



                                                  Wasted:           



                                                  _0__ mg           

 

     insulin            No                       Notes:           [facility]



     lispro      4-12                               (Same as:           l



               05:00:                               Humalog)           [facility]



               00                                 Roll in           



                                                  palms of           



                                                  hands           



                                                  gently; Do           



                                                  not shake           



                                                  vigorously           



                                                  . WASTE:           



                                                  F/P -           



                                                  Black; E -           



                                                  Municipal           



                                                  Trash Bin           



                                                  Stable for           



                                                  28 days at           



                                                  room           



                                                  temperatur           



                                                  e. Expires           



                                                  in _____           



                                                  days from           



                                                  __________           



                                                  ____Date           

 

     cefepime +            No                       Notes:           [facility]

[facility]



     sterile      4-12                               (Same As:           l



     water 10 mL      05:00:                               Maxipime)           [facility]

[facility]



               00                                 ***            



                                                  MEDICATION           



                                                  WASTE ***           



                                                  Product           



                                                  Size: 1000           



                                                  mg Product           



                                                  Wasted:           



                                                  _0__ mg           

 

     insulin            No                       Notes:           [facility]



     lispro      4-12                               (Same as:           l



               05:00:                               Humalog)           [place]



               00                                 Roll in           



                                                  palms of           



                                                  hands           



                                                  gently; Do           



                                                  not shake           



                                                  vigorously           



                                                  . WASTE:           



                                                  F/P -           



                                                  Black; E -           



                                                  Municipal           



                                                  Trash Bin           



                                                  Stable for           



                                                  28 days at           



                                                  room           



                                                  temperatur           



                                                  e. Expires           



                                                  in _____           



                                                  days from           



                                                  __________           



                                                  ____Date           

 

     Dilaudid            No                       Notes:           [facility]



               4-11                               (Same as:           l



               16:53:                               Dilaudid)           [facility]



                                                               

 

     Dilaudid            No                       Notes:           [facility]



               4-11                               (Same as:           l



               16:53:                               Dilaudid)           [facility]



                                                               

 

     Dilaudid            No                       Notes:           [facility]



               4-11                               (Same as:           l



               16:53:                               Dilaudid)           [facility]



                                                               

 

     Dilaudid            No                       Notes:           [facility]



               4-11                               (Same as:           l



               16:53:                               Dilaudid)                                                           

 

     Dilaudid            No                       Notes:           [facility]



               4-11                               (Same as:           l



               16:53:                               Dilaudid)                                                           

 

     Dilaudid            No                       Notes:           [facility]



               4-11                               (Same as:           l



               16:53:                               Dilaudid)                                                           

 

     Lantus 100      -0      No                       10 unit,           [facility]

[facility]



     units/mL      4-11                               0.1 mL,           l



               16:24:                               Route:           [place]



                                                SUB-Q,           



                                                  Drug form:           



                                                  SOLN,           



                                                  ONCE,           



                                                  Dosing           



                                                  Weight           



                                                  127.727,           



                                                  kg, Start           



                                                  date:           



                                                  22           



                                                  11:24:00           



                                                  CDT, Stop           



                                                  date:           



                                                  22           



                                                  11:24:00           



                                                  CDT,           



                                                  Infuse           



                                                  over: 0           



                                                  hr, 0           

 

     Lantus 100      -0      No                       10 unit,           [facility]

[facility]



     units/mL      4-11                               0.1 mL,           l



               16:24:                               Route:           [facility]



                                                SUB-Q,           



                                                  Drug form:           



                                                  SOLN,           



                                                  ONCE,           



                                                  Dosing           



                                                  Weight           



                                                  127.727,           



                                                  kg, Start           



                                                  date:           



                                                  22           



                                                  11:24:00           



                                                  CDT, Stop           



                                                  date:           



                                                  22           



                                                  11:24:00           



                                                  CDT,           



                                                  Infuse           



                                                  over: 0           



                                                  hr, 0           

 

     Lantus 100      -0      No                       10 unit,           [facility]

[facility]



     units/mL      4-11                               0.1 mL,           l



               16:24:                               Route:           [facility]



                                                SUB-Q,           



                                                  Drug form:           



                                                  SOLN,           



                                                  ONCE,           



                                                  Dosing           



                                                  Weight           



                                                  127.727,           



                                                  kg, Start           



                                                  date:           



                                                  22           



                                                  11:24:00           



                                                  CDT, Stop           



                                                  date:           



                                                  22           



                                                  11:24:00           



                                                  CDT,           



                                                  Infuse           



                                                  over: 0           



                                                  hr, 0           

 

     Lantus 100      -0      No                       10 unit,           [facility]

[facility]



     units/mL      4-11                               0.1 mL,           l



               16:24:                               Route:           [facility]



                                                SUB-Q,           



                                                  Drug form:           



                                                  SOLN,           



                                                  ONCE,           



                                                  Dosing           



                                                  Weight           



                                                  127.727,           



                                                  kg, Start           



                                                  date:           



                                                  22           



                                                  11:24:00           



                                                  CDT, Stop           



                                                  date:           



                                                  22           



                                                  11:24:00           



                                                  CDT,           



                                                  Infuse           



                                                  over: 0           



                                                  hr, 0           

 

     Lantus 100      -0      No                       10 unit,           [facility]

[facility]



     units/mL      4-11                               0.1 mL,           l



               16:24:                               Route:           [place]



               00                                 SUB-Q,           



                                                  Drug form:           



                                                  SOLN,           



                                                  ONCE,           



                                                  Dosing           



                                                  Weight           



                                                  127.727,           



                                                  kg, Start           



                                                  date:           



                                                  22           



                                                  11:24:00           



                                                  CDT, Stop           



                                                  date:           



                                                  22           



                                                  11:24:00           



                                                  CDT,           



                                                  Infuse           



                                                  over: 0           



                                                  hr, 0           

 

     Lantus 100      -0      No                       10 unit,           [facility]

[facility]



     units/mL      4-11                               0.1 mL,           l



               16:24:                               Route:           [place]



               00                                 SUB-Q,           



                                                  Drug form:           



                                                  SOLN,           



                                                  ONCE,           



                                                  Dosing           



                                                  Weight           



                                                  127.727,           



                                                  kg, Start           



                                                  date:           



                                                  22           



                                                  11:24:00           



                                                  CDT, Stop           



                                                  date:           



                                                  22           



                                                  11:24:00           



                                                  CDT,           



                                                  Infuse           



                                                  over: 0           



                                                  hr, 0           

 

     NS (Bolus)      -0      No                       500 mL,           [facility]

[facility]



     IV        4-11                               500 ml/hr,           l



               15:15:                               Infuse           [place]



               00                                 Over: 1           



                                                  hr, Route:           



                                                  IV, 500,           



                                                  Drug form:           



                                                  INJ, ONCE,           



                                                  Priority:           



                                                  STAT,           



                                                  Dosing           



                                                  Weight           



                                                  127.727           



                                                  kg, Start           



                                                  date:           



                                                  22           



                                                  10:15:00           



                                                  CDT, Stop           



                                                  date:           



                                                  22           



                                                  10:15:00           



                                                  CDT, 0           

 

     NS (Bolus)      -0      No                       500 mL,           [facility]

[facility]



     IV        4-11                               500 ml/hr,           l



               15:15:                               Infuse           [facility]



               00                                 Over: 1           



                                                  hr, Route:           



                                                  IV, 500,           



                                                  Drug form:           



                                                  INJ, ONCE,           



                                                  Priority:           



                                                  STAT,           



                                                  Dosing           



                                                  Weight           



                                                  127.727           



                                                  kg, Start           



                                                  date:           



                                                  22           



                                                  10:15:00           



                                                  CDT, Stop           



                                                  date:           



                                                  22           



                                                  10:15:00           



                                                  CDT, 0           

 

     NS (Bolus)      -0      No                       500 mL,           [facility]

[facility]



     IV        4-11                               500 ml/hr,           l



               15:15:                               Infuse           [place]



               00                                 Over: 1           



                                                  hr, Route:           



                                                  IV, 500,           



                                                  Drug form:           



                                                  INJ, ONCE,           



                                                  Priority:           



                                                  STAT,           



                                                  Dosing           



                                                  Weight           



                                                  127.727           



                                                  kg, Start           



                                                  date:           



                                                  22           



                                                  10:15:00           



                                                  CDT, Stop           



                                                  date:           



                                                  22           



                                                  10:15:00           



                                                  CDT, 0           

 

     NS (Bolus)      -0      No                       500 mL,           [facility]

[facility]



     IV        4-11                               500 ml/hr,           l



               15:15:                               Infuse           [place]



               00                                 Over: 1           



                                                  hr, Route:           



                                                  IV, 500,           



                                                  Drug form:           



                                                  INJ, ONCE,           



                                                  Priority:           



                                                  STAT,           



                                                  Dosing           



                                                  Weight           



                                                  127.727           



                                                  kg, Start           



                                                  date:           



                                                  22           



                                                  10:15:00           



                                                  CDT, Stop           



                                                  date:           



                                                  22           



                                                  10:15:00           



                                                  CDT, 0           

 

     NS (Bolus)      2022-0      No                       500 mL,           [facility]

[facility]



     IV        4-11                               500 ml/hr,           l



               15:15:                               Infuse                                            Over: 1           



                                                  hr, Route:           



                                                  IV, 500,           



                                                  Drug form:           



                                                  INJ, ONCE,           



                                                  Priority:           



                                                  STAT,           



                                                  Dosing           



                                                  Weight           



                                                  127.727           



                                                  kg, Start           



                                                  date:           



                                                  22           



                                                  10:15:00           



                                                  CDT, Stop           



                                                  date:           



                                                  22           



                                                  10:15:00           



                                                  CDT, 0           

 

     NS (Bolus)      -0      No                       500 mL,           [facility]

[facility]



     IV        4-11                               500 ml/hr,           l



               15:15:                               Infuse                                            Over: 1           



                                                  hr, Route:           



                                                  IV, 500,           



                                                  Drug form:           



                                                  INJ, ONCE,           



                                                  Priority:           



                                                  STAT,           



                                                  Dosing           



                                                  Weight           



                                                  127.727           



                                                  kg, Start           



                                                  date:           



                                                  22           



                                                  10:15:00           



                                                  CDT, Stop           



                                                  date:           



                                                  22           



                                                  10:15:00           



                                                  CDT, 0           

 

     Dextrose      -0      No                       12.5 gm,           [facility]

[facility]



     50% Syringe      -11                               25 mL,           l



     (D50W)      13:59:                               Route:                                            IVP, Drug           



                                                  Form: INJ,           



                                                  Dosing           



                                                  Weight           



                                                  127.727,           



                                                  kg, PRN,           



                                                  PRN Blood           



                                                  Glucose           



                                                  Results,           



                                                  Start           



                                                  date:           



                                                  22           



                                                  8:59:00           



                                                  CDT,           



                                                  Duration:           



                                                  30 day,           



                                                  Stop date:           



                                                  22           



                                                  8:58:00           



                                                  CDT, 0           

 

     glucagon      -0      No                       1 mg,           [facility]



               4-11                               Route: IM,           l



               13:59:                               Drug form:                                            PDR/INJ,           



                                                  PRN,           



                                                  Dosing           



                                                  Weight           



                                                  127.727,           



                                                  kg, PRN           



                                                  Blood           



                                                  Glucose           



                                                  Results,           



                                                  Start           



                                                  date:           



                                                  22           



                                                  8:59:00           



                                                  CDT,           



                                                  Duration:           



                                                  30 day,           



                                                  Stop date:           



                                                  22           



                                                  8:58:00           



                                                  CDT, 0           

 

     insulin      2-0      No                       Notes:           [facility]



     lispro      4-11                               (Same as:           l



               13:59:                               Humalog)                                            Roll in           



                                                  palms of           



                                                  hands           



                                                  gently; Do           



                                                  not shake           



                                                  vigorously           



                                                  . WASTE:           



                                                  F/P -           



                                                  Black; E -           



                                                  Municipal           



                                                  Trash Bin           



                                                  Stable for           



                                                  28 days at           



                                                  room           



                                                  temperatur           



                                                  e. Expires           



                                                  in _____           



                                                  days from           



                                                  __________           



                                                  ____Date           

 

     Dextrose      2-0      No                       12.5 gm,           [facility]

[facility]



     50% Syringe      4-11                               25 mL,           l



     (D50W)      13:59:                               Route:           [facility]



               00                                 IVP, Drug           



                                                  Form: INJ,           



                                                  Dosing           



                                                  Weight           



                                                  127.727,           



                                                  kg, PRN,           



                                                  PRN Blood           



                                                  Glucose           



                                                  Results,           



                                                  Start           



                                                  date:           



                                                  22           



                                                  8:59:00           



                                                  CDT,           



                                                  Duration:           



                                                  30 day,           



                                                  Stop date:           



                                                  22           



                                                  8:58:00           



                                                  CDT, 0           

 

     glucagon      -0      No                       1 mg,           [facility]



               4-11                               Route: IM,           l



               13:59:                               Drug form:           [facility]



               00                                 PDR/INJ,           



                                                  PRN,           



                                                  Dosing           



                                                  Weight           



                                                  127.727,           



                                                  kg, PRN           



                                                  Blood           



                                                  Glucose           



                                                  Results,           



                                                  Start           



                                                  date:           



                                                  22           



                                                  8:59:00           



                                                  CDT,           



                                                  Duration:           



                                                  30 day,           



                                                  Stop date:           



                                                  22           



                                                  8:58:00           



                                                  CDT, 0           

 

     insulin      -0      No                       Notes:           [facility]



     lispro      -11                               (Same as:           l



               13:59:                               Humalog)                                            Roll in           



                                                  palms of           



                                                  hands           



                                                  gently; Do           



                                                  not shake           



                                                  vigorously           



                                                  . WASTE:           



                                                  F/P -           



                                                  Black; E -           



                                                  Municipal           



                                                  Trash Bin           



                                                  Stable for           



                                                  28 days at           



                                                  room           



                                                  temperatur           



                                                  e. Expires           



                                                  in _____           



                                                  days from           



                                                  __________           



                                                  ____Date           

 

     Dextrose      -0      No                       12.5 gm,           [facility]

[facility]



     50% Syringe      4-11                               25 mL,           l



     (D50W)      13:59:                               Route:           [facility]



               00                                 IVP, Drug           



                                                  Form: INJ,           



                                                  Dosing           



                                                  Weight           



                                                  127.727,           



                                                  kg, PRN,           



                                                  PRN Blood           



                                                  Glucose           



                                                  Results,           



                                                  Start           



                                                  date:           



                                                  22           



                                                  8:59:00           



                                                  CDT,           



                                                  Duration:           



                                                  30 day,           



                                                  Stop date:           



                                                  22           



                                                  8:58:00           



                                                  CDT, 0           

 

     glucagon      -0      No                       1 mg,           [facility]



               4-11                               Route: IM,           l



               13:59:                               Drug form:           [place]



               00                                 PDR/INJ,           



                                                  PRN,           



                                                  Dosing           



                                                  Weight           



                                                  127.727,           



                                                  kg, PRN           



                                                  Blood           



                                                  Glucose           



                                                  Results,           



                                                  Start           



                                                  date:           



                                                  22           



                                                  8:59:00           



                                                  CDT,           



                                                  Duration:           



                                                  30 day,           



                                                  Stop date:           



                                                  22           



                                                  8:58:00           



                                                  CDT, 0           

 

     insulin      202-0      No                       Notes:           [facility]



     lispro      4-11                               (Same as:           l



               13:59:                               Humalog)           [place]



               00                                 Roll in           



                                                  palms of           



                                                  hands           



                                                  gently; Do           



                                                  not shake           



                                                  vigorously           



                                                  . WASTE:           



                                                  F/P -           



                                                  Black; E -           



                                                  Municipal           



                                                  Trash Bin           



                                                  Stable for           



                                                  28 days at           



                                                  room           



                                                  temperatur           



                                                  e. Expires           



                                                  in _____           



                                                  days from           



                                                  __________           



                                                  ____Date           

 

     Dextrose      -0      No                       12.5 gm,           [facility]

[facility]



     50% Syringe      4-11                               25 mL,           l



     (D50W)      13:59:                               Route:           [facility]



               00                                 IVP, Drug           



                                                  Form: INJ,           



                                                  Dosing           



                                                  Weight           



                                                  127.727,           



                                                  kg, PRN,           



                                                  PRN Blood           



                                                  Glucose           



                                                  Results,           



                                                  Start           



                                                  date:           



                                                  22           



                                                  8:59:00           



                                                  CDT,           



                                                  Duration:           



                                                  30 day,           



                                                  Stop date:           



                                                  22           



                                                  8:58:00           



                                                  CDT, 0           

 

     glucagon      -0      No                       1 mg,           [facility]



               4-11                               Route: IM,           l



               13:59:                               Drug form:           [facility]



               00                                 PDR/INJ,           



                                                  PRN,           



                                                  Dosing           



                                                  Weight           



                                                  127.727,           



                                                  kg, PRN           



                                                  Blood           



                                                  Glucose           



                                                  Results,           



                                                  Start           



                                                  date:           



                                                  22           



                                                  8:59:00           



                                                  CDT,           



                                                  Duration:           



                                                  30 day,           



                                                  Stop date:           



                                                  22           



                                                  8:58:00           



                                                  CDT, 0           

 

     insulin      -0      No                       Notes:           [facility]



     lispro      4-11                               (Same as:           l



               13:59:                               Humalog)           [facility]



               00                                 Roll in           



                                                  palms of           



                                                  hands           



                                                  gently; Do           



                                                  not shake           



                                                  vigorously           



                                                  . WASTE:           



                                                  F/P -           



                                                  Black; E -           



                                                  Municipal           



                                                  Trash Bin           



                                                  Stable for           



                                                  28 days at           



                                                  room           



                                                  temperatur           



                                                  e. Expires           



                                                  in _____           



                                                  days from           



                                                  __________           



                                                  ____Date           

 

     Dextrose      -0      No                       12.5 gm,           [facility]

[facility]



     50% Syringe      4-11                               25 mL,           l



     (D50W)      13:59:                               Route:           [facility]



               00                                 IVP, Drug           



                                                  Form: INJ,           



                                                  Dosing           



                                                  Weight           



                                                  127.727,           



                                                  kg, PRN,           



                                                  PRN Blood           



                                                  Glucose           



                                                  Results,           



                                                  Start           



                                                  date:           



                                                  22           



                                                  8:59:00           



                                                  CDT,           



                                                  Duration:           



                                                  30 day,           



                                                  Stop date:           



                                                  22           



                                                  8:58:00           



                                                  CDT, 0           

 

     glucagon      -0      No                       1 mg,           [facility]



               4-11                               Route: IM,           l



               13:59:                               Drug form:           [facility]



               00                                 PDR/INJ,           



                                                  PRN,           



                                                  Dosing           



                                                  Weight           



                                                  127.727,           



                                                  kg, PRN           



                                                  Blood           



                                                  Glucose           



                                                  Results,           



                                                  Start           



                                                  date:           



                                                  22           



                                                  8:59:00           



                                                  CDT,           



                                                  Duration:           



                                                  30 day,           



                                                  Stop date:           



                                                  22           



                                                  8:58:00           



                                                  CDT, 0           

 

     insulin      -0      No                       Notes:           [facility]



     lispro      4-11                               (Same as:           l



               13:59:                               Humalog)           [facility]                                 Roll in           



                                                  palms of           



                                                  hands           



                                                  gently; Do           



                                                  not shake           



                                                  vigorously           



                                                  . WASTE:           



                                                  F/P -           



                                                  Black; E -           



                                                  Municipal           



                                                  Trash Bin           



                                                  Stable for           



                                                  28 days at           



                                                  room           



                                                  temperatur           



                                                  e. Expires           



                                                  in _____           



                                                  days from           



                                                  __________           



                                                  ____Date           

 

     Dextrose            No                       12.5 gm,           [facility]

[facility]



     50% Syringe                                     25 mL,           l



     (D50W)      13:59:                               Route:                                            IVP, Drug           



                                                  Form: INJ,           



                                                  Dosing           



                                                  Weight           



                                                  127.727,           



                                                  kg, PRN,           



                                                  PRN Blood           



                                                  Glucose           



                                                  Results,           



                                                  Start           



                                                  date:           



                                                  22           



                                                  8:59:00           



                                                  CDT,           



                                                  Duration:           



                                                  30 day,           



                                                  Stop date:           



                                                  22           



                                                  8:58:00           



                                                  CDT, 0           

 

     glucagon      0      No                       1 mg,           [facility]



               4-11                               Route: IM,           l



               13:59:                               Drug form:           [facility]                                 PDR/INJ,           



                                                  PRN,           



                                                  Dosing           



                                                  Weight           



                                                  127.727,           



                                                  kg, PRN           



                                                  Blood           



                                                  Glucose           



                                                  Results,           



                                                  Start           



                                                  date:           



                                                  22           



                                                  8:59:00           



                                                  CDT,           



                                                  Duration:           



                                                  30 day,           



                                                  Stop date:           



                                                  22           



                                                  8:58:00           



                                                  CDT, 0           

 

     insulin      -0      No                       Notes:           [facility]



     lispro      4-11                               (Same as:           l



               13:59:                               Humalog)           [facility]                                 Roll in           



                                                  palms of           



                                                  hands           



                                                  gently; Do           



                                                  not shake           



                                                  vigorously           



                                                  . WASTE:           



                                                  F/P -           



                                                  Black; E -           



                                                  Municipal           



                                                  Trash Bin           



                                                  Stable for           



                                                  28 days at           



                                                  room           



                                                  temperatur           



                                                  e. Expires           



                                                  in _____           



                                                  days from           



                                                  __________           



                                                  ____Date           

 

     Lyrica            No                       Notes:           [facility]



               4-11                               (Same as:           l



               02:00:                               [facility])                                                           

 

     Lyrica            No                       Notes:           [facility]



               4-11                               (Same as:           l



               02:00:                               Lyrica)           [facility]



                                                               

 

     Lyrica            No                       Notes:           [facility]



               4-11                               (Same as:           l



               02:00:                               Lyrica)           [facility]                                                

 

     Lyrica            No                       Notes:           [facility]



               4-11                               (Same as:           l



               02:00:                               Lyrica)           [facility]



                                                               

 

     Lyrica            No                       Notes:           [facility]



               4-11                               (Same as:           l



               02:00:                               Lyrica)           [facility]                                                

 

     Lyrica            No                       Notes:           [facility]



               4-11                               (Same as:           l



               02:00:                               [facility])                                                           

 

     valproic            No                       Notes:           [facility]



     acid 100      4-10                               Dilute in           l



     mg/mL      17:00:                               at least           [facility]



     intravenous      00                                 50ml D5W           



     solution +                                         or NS.           



     Sodium                                         Infusion           



     Chloride                                         rate = 20           



     0.9% IV 50                                         mg/min           



     mL                                           (Same As:           



                                                  [facility])           



                                                  Hazardous           



                                                  Drug Group           



                                                  3:Reproduc           



                                                  tive risk           



                                                  Hazardous           



                                                  Drug --           



                                                  Refer to           



                                                  safe           



                                                  handling           



                                                  procedure           



                                                  [facility]           

 

     valproic            No                       Notes:           [facility]



     acid 100      4-10                               Dilute in           l



     mg/mL      17:00:                               at least           [facility]



     intravenous      00                                 50ml D5W           



     solution +                                         or NS.           



     Sodium                                         Infusion           



     Chloride                                         rate = 20           



     0.9% IV 50                                         mg/min           



     mL                                           (Same As:           



                                                  [facility])           



                                                  Hazardous           



                                                  Drug Group           



                                                  3:Reproduc           



                                                  tive risk           



                                                  Hazardous           



                                                  Drug --           



                                                  Refer to           



                                                  safe           



                                                  handling           



                                                  procedure           



                                                  [facility]           

 

     valproic            No                       Notes:           [facility]



     acid 100      4-10                               Dilute in           l



     mg/mL      17:00:                               at least           [facility]



     intravenous      00                                 50ml D5W           



     solution +                                         or NS.           



     Sodium                                         Infusion           



     Chloride                                         rate = 20           



     0.9% IV 50                                         mg/min           



     mL                                           (Same As:           



                                                  [facility])           



                                                  Hazardous           



                                                  Drug Group           



                                                  3:Reproduc           



                                                  tive risk           



                                                  Hazardous           



                                                  Drug --           



                                                  Refer to           



                                                  safe           



                                                  handling           



                                                  procedure           



                                                  [facility]           

 

     valproic            No                       Notes:           [facility]



     acid 100      4-10                               Dilute in           l



     mg/mL      17:00:                               at least           [facility]



     intravenous      00                                 50ml D5W           



     solution +                                         or NS.           



     Sodium                                         Infusion           



     Chloride                                         rate = 20           



     0.9% IV 50                                         mg/min           



     mL                                           (Same As:           



                                                  [facility])           



                                                  Hazardous           



                                                  Drug Group           



                                                  3:Reproduc           



                                                  tive risk           



                                                  Hazardous           



                                                  Drug --           



                                                  Refer to           



                                                  safe           



                                                  handling           



                                                  procedure           



                                                  [facility]           

 

     valproic            No                       Notes:           [facility]



     acid 100      4-10                               Dilute in           l



     mg/mL      17:00:                               at least           [place]



     intravenous      00                                 50ml D5W           



     solution +                                         or NS.           



     Sodium                                         Infusion           



     Chloride                                         rate = 20           



     0.9% IV 50                                         mg/min           



     mL                                           (Same As:           



                                                  [facility])           



                                                  Hazardous           



                                                  Drug Group           



                                                  3:Reproduc           



                                                  tive risk           



                                                  Hazardous           



                                                  Drug --           



                                                  Refer to           



                                                  safe           



                                                  handling           



                                                  procedure           



                                                  [facility]           

 

     valproic            No                       Notes:           [facility]



     acid 100      4-10                               Dilute in           l



     mg/mL      17:00:                               at least           [place]



     intravenous      00                                 50ml D5W           



     solution +                                         or NS.           



     Sodium                                         Infusion           



     Chloride                                         rate = 20           



     0.9% IV 50                                         mg/min           



     mL                                           (Same As:           



                                                  [facility])           



                                                  Hazardous           



                                                  Drug Group           



                                                  3:Reproduc           



                                                  tive risk           



                                                  Hazardous           



                                                  Drug --           



                                                  Refer to           



                                                  safe           



                                                  handling           



                                                  procedure           



                                                  [facility]           

 

     Solu-MEDROL            No                       Notes:           [facility]

rajeev



               4-10                               (Same           l



               16:43:                               as:Solu-ME           [facility]



               00                                 DROL,           



                                                  A-Methapre           



                                                  d) ***           



                                                  MEDICATION           



                                                  WASTE ***           



                                                  Product           



                                                  Size: 1000           



                                                  mg Product           



                                                  Wasted:           



                                                  _0__ mg           

 

     magnesium            No                       Notes:           [facility]

a



     sulfate      4-10                               WASTE: F/P           l



               16:43:                               - Sink; E           [facility]



                                                -              



                                                  [facility]unicipal           



                                                  Trash Bin           

 

     Solu-MEDROL            No                       Notes:           [facility]

rajeev



               4-10                               (Same           l



               16:43:                               as:Solu-ME           [place]



               00                                 DROL,           



                                                  A-Methapre           



                                                  d) ***           



                                                  MEDICATION           



                                                  WASTE ***           



                                                  Product           



                                                  Size: 1000           



                                                  mg Product           



                                                  Wasted:           



                                                  _0__ mg           

 

     magnesium            No                       Notes:           [facility]

a



     sulfate      4-10                               WASTE: F/P           l



               16:43:                               - Sink; E           [facility]



                                                -              



                                                  Municipal           



                                                  Trash Bin           

 

     Solu-MEDROL            No                       Notes:           [facility]

rajeev



               4-10                               (Same           l



               16:43:                               as:Solu-ME           [place]



               00                                 DROL,           



                                                  A-Methapre           



                                                  d) ***           



                                                  MEDICATION           



                                                  WASTE ***           



                                                  Product           



                                                  Size: 1000           



                                                  mg Product           



                                                  Wasted:           



                                                  _0__ mg           

 

     magnesium            No                       Notes:           [facility]

a



     sulfate      4-10                               WASTE: F/P           l



               16:43:                               - Sink; E           [facility]



               00                                 -              



                                                  Municipal           



                                                  Trash Bin           

 

     Solu-MEDROL            No                       Notes:           [facility]

rajeev



               4-10                               (Same           l



               16:43:                               as:Solu-ME           [place]



               00                                 DROL,           



                                                  A-Methapre           



                                                  d) ***           



                                                  MEDICATION           



                                                  WASTE ***           



                                                  Product           



                                                  Size: 1000           



                                                  mg Product           



                                                  Wasted:           



                                                  _0__ mg           

 

     magnesium            No                       Notes:           [facility]

a



     sulfate      4-10                               WASTE: F/P           l



               16:43:                               - Sink; E           [place]                                 -              



                                                  Municipal           



                                                  Trash Bin           

 

     Solu-MEDROL            No                       Notes:           [facility]

rajeev



               4-10                               (Same           l



               16:43:                               as:Solu-ME           [facility]



               00                                 DROL,           



                                                  A-Methapre           



                                                  d) ***           



                                                  MEDICATION           



                                                  WASTE ***           



                                                  Product           



                                                  Size: 1000           



                                                  mg Product           



                                                  Wasted:           



                                                  _0__ mg           

 

     magnesium            No                       Notes:           [facility]

a



     sulfate      4-10                               WASTE: F/P           l



               16:43:                               - Sink; E                                            -              



                                                  Municipal           



                                                  Trash Bin           

 

     Solu-MEDROL            No                       Notes:           [facility]

rajeev



               4-10                               (Same           l



               16:43:                               as:Solu-ME           [place]



               00                                 DROL,           



                                                  A-Methapre           



                                                  d) ***           



                                                  MEDICATION           



                                                  WASTE ***           



                                                  Product           



                                                  Size: 1000           



                                                  mg Product           



                                                  Wasted:           



                                                  _0__ mg           

 

     magnesium            No                       Notes:           [facility]

a



     sulfate      4-10                               WASTE: F/P           l



               16:43:                               - Sink; E                                            -              



                                                  Municipal           



                                                  Trash Bin           

 

     amitriptyli            No                       Notes:           [facility]

[facility]



     ne        4-10                               (Same as:           l



               02:00:                               Elavil)                                                           

 

     amitriptyli            No                       Notes:           [facility]

[facility]



     ne        4-10                               (Same as:           l



               02:00:                               Elavil)                                                           

 

     amitriptyli            No                       Notes:           [facility]

[facility]



     ne        4-10                               (Same as:           l



               02:00:                               Elavil)                                                           

 

     amitriptyli            No                       Notes:           [facility]

[facility]



     ne        4-10                               (Same as:           l



               02:00:                               Elavil)                                                           

 

     amitriptyli            No                       Notes:           [facility]

[facility]



     ne        4-10                               (Same as:           l



               02:00:                               Elavil)                                                           

 

     amitriptyli            No                       Notes:           [facility]

[facility]



     ne        4-10                               (Same as:           l



               02:00:                               Elavil)                                                           

 

     SUMAtriptan            No                       Notes:           [facility]

ra[facility]ev



               4-09                               (Same As:           l



               18:45:                               Imitrex)                                                           

 

     SUMAtriptan            No                       Notes:           [facility]

[facility]



               4-09                               (Same As:           l



               18:45:                               Imitrex)                                                           

 

     SUMAtriptan            No                       Notes:           [facility]

ra[facility]



               4-09                               (Same As:           l



               18:45:                               Imitrex)                                                           

 

     SUMAtriptan            No                       Notes:           [facility]

ra[facility]ev



               4-09                               (Same As:           l



               18:45:                               Imitrex)                                                           

 

     SUMAtriptan            No                       Notes:           [facility]

ra[facility]ev



               4-09                               (Same As:           l



               18:45:                               Imitrex)                                                           

 

     SUMAtriptan            No                       Notes:           [facility]

ra[facility]ev



               4-09                               (Same As:           l



               18:45:                               Imitrex)                                                           

 

     promethazin            No                       6.25 mg,           [facility]

[facility]



     e         4-09                               Route:           l



               18:44:                               IVPB,           [place]



               00                                 Q12H,           



                                                  Dosing           



                                                  Weight           



                                                  127.727,           



                                                  kg, PRN           



                                                  Nausea &           



                                                  Vomiting,           



                                                  Start           



                                                  date:           



                                                  22           



                                                  13:44:00           



                                                  CDT,           



                                                  Duration:           



                                                  30 day,           



                                                  Stop date:           



                                                  22           



                                                  13:43:00           



                                                  CDT            

 

     promethazin      -0      No                       6.25 mg,           [facility]

[facility]



     e         4-09                               Route:           l



               18:44:                               IVPB,           [facility]



               00                                 Q12H,           



                                                  Dosing           



                                                  Weight           



                                                  127.727,           



                                                  kg, PRN           



                                                  Nausea &           



                                                  Vomiting,           



                                                  Start           



                                                  date:           



                                                  22           



                                                  13:44:00           



                                                  CDT,           



                                                  Duration:           



                                                  30 day,           



                                                  Stop date:           



                                                  22           



                                                  13:43:00           



                                                  CDT            

 

     promethazin      -0      No                       6.25 mg,           [facility]

[facility]



     e         4-09                               Route:           l



               18:44:                               IVPB,           [place]



               00                                 Q12H,           



                                                  Dosing           



                                                  Weight           



                                                  127.727,           



                                                  kg, PRN           



                                                  Nausea &           



                                                  Vomiting,           



                                                  Start           



                                                  date:           



                                                  22           



                                                  13:44:00           



                                                  CDT,           



                                                  Duration:           



                                                  30 day,           



                                                  Stop date:           



                                                  22           



                                                  13:43:00           



                                                  CDT            

 

     promethazin      2-0      No                       6.25 mg,           [facility]

[facility]



     e         4-09                               Route:           l



               18:44:                               IVPB,           [facility]



               00                                 Q12H,           



                                                  Dosing           



                                                  Weight           



                                                  127.727,           



                                                  kg, PRN           



                                                  Nausea &           



                                                  Vomiting,           



                                                  Start           



                                                  date:           



                                                  22           



                                                  13:44:00           



                                                  CDT,           



                                                  Duration:           



                                                  30 day,           



                                                  Stop date:           



                                                  22           



                                                  13:43:00           



                                                  CDT            

 

     promethazin      -0      No                       6.25 mg,           [facility]

[facility]



     e         4-09                               Route:           l



               18:44:                               IVPB,           [place]



               00                                 Q12H,           



                                                  Dosing           



                                                  Weight           



                                                  127.727,           



                                                  kg, PRN           



                                                  Nausea &           



                                                  Vomiting,           



                                                  Start           



                                                  date:           



                                                  22           



                                                  13:44:00           



                                                  CDT,           



                                                  Duration:           



                                                  30 day,           



                                                  Stop date:           



                                                  22           



                                                  13:43:00           



                                                  CDT            

 

     promethazin      -0      No                       6.25 mg,           [facility]

[facility]



     e         4-09                               Route:           l



               18:44:                               IVPB,           [facility]



               00                                 Q12H,           



                                                  Dosing           



                                                  Weight           



                                                  127.727,           



                                                  kg, PRN           



                                                  Nausea &           



                                                  Vomiting,           



                                                  Start           



                                                  date:           



                                                  22           



                                                  13:44:00           



                                                  CDT,           



                                                  Duration:           



                                                  30 day,           



                                                  Stop date:           



                                                  22           



                                                  13:43:00           



                                                  CDT            

 

     Benadryl            No                       Notes:           [facility]



               4-09                               (Same as:           l



               18:37:                               Benadryl)                                                           

 

     Benadryl            No                       Notes:           [facility]



               4-09                               (Same as:           l



               18:37:                               Benadryl)                                                           

 

     Benadryl            No                       Notes:           [facility]



               4-09                               (Same as:           l



               18:37:                               Benadryl)                                                           

 

     Benadryl            No                       Notes:           [facility]



               4-09                               (Same as:           l



               18:37:                               Benadryl)                                                           

 

     Benadryl            No                       Notes:           [facility]



               4-09                               (Same as:           l



               18:37:                               Benadryl)                                                           

 

     Benadryl            No                       Notes:           [facility]



               4-09                               (Same as:           l



               18:37:                               Benadryl)                                                           

 

     Dilaudid            No                       Notes:           [facility]



               4-09                               (Same as:           l



               18:36:                               Dilaudid)                                                           

 

     Dilaudid            No                       Notes:           [facility]



               4-09                               (Same as:           l



               18:36:                               Dilaudid)                                                           

 

     Dilaudid            No                       Notes:           [facility]



               4-09                               (Same as:           l



               18:36:                               Dilaudid)           



                                                               

 

     Dilaudid            No                       Notes:           [facility]



               4-09                               (Same as:           l



               18:36:                               Dilaudid)           [facility]



                                                               

 

     Dilaudid            No                       Notes:           [facility]



               4-09                               (Same as:           l



               18:36:                               Dilaudid)           [facility]



                                                               

 

     Dilaudid            No                       Notes:           [facility]



               4-09                               (Same as:           l



               18:36:                               Dilaudid)           [place]



                                                               

 

     Phenergan            No                       Notes:           [facility]

a



               4-09                               (Same as:           l



               18:33:                               Phenergan)           [facility]



               00                                 6.25 mg =           



                                                  1/2 x 12.5           



                                                  mg TAB           

 

     Phenergan            No                       Notes:           [facility]

a



               4-09                               (Same as:           l



               18:33:                               Phenergan)           [place]



               00                                 6.25 mg =           



                                                  1/2 x 12.5           



                                                  mg TAB           

 

     Phenergan            No                       Notes:           [facility]

a



               4-09                               (Same as:           l



               18:33:                               Phenergan)           [facility]



               00                                 6.25 mg =           



                                                  1/2 x 12.5           



                                                  mg TAB           

 

     Phenergan            No                       Notes:           [facility]

a



               4-09                               (Same as:           l



               18:33:                               Phenergan)           [place]



               00                                 6.25 mg =           



                                                  1/2 x 12.5           



                                                  mg TAB           

 

     Phenergan            No                       Notes:           [facility]

a



               4-09                               (Same as:           l



               18:33:                               Phenergan)           [facility]



               00                                 6.25 mg =           



                                                  1/2 x 12.5           



                                                  mg TAB           

 

     Phenergan            No                       Notes:           [facility]

a



               4-09                               (Same as:           l



               18:33:                               Phenergan)           [place]



               00                                 6.25 mg =           



                                                  1/2 x 12.5           



                                                  mg TAB           

 

     ascorbic            No                       Notes:           [facility]



     acid      4-09                               (Same as:           l



               14:00:                               Vitamin C)           [place]



                                                               

 

     celecoxib            No                       Notes:           [facility]

a



               4-09                               NSAID.           l



               14:00:                               Please           [place]



               00                                 check           



                                                  indication           



                                                  . Not for           



                                                  seizure.           



                                                  (Same As:           



                                                  CeleBREX)           

 

     Lidoderm 5%            No                       Notes:           [facility]

[facility]



     topical      -                               Apply only           l



     film      14:00:                               once for           [place]



     (patch)      00                                 up to 12           



                                                  hours in a           



                                                  24-hour           



                                                  period (12           



                                                  hours on           



                                                  and 12           



                                                  hours           



                                                  off).           



                                                  (Same as:           



                                                  [facility]           



                                                  Lidocaine           



                                                  Patch)           



                                                  "Remove           



                                                  old patch           



                                                  before           



                                                  applicatio           



                                                  n of new           



                                                  patch"           

 

     zinc            No                       Notes:           [facility]



     sulfate      4-09                               (Zinc           l



               14:00:                               sulfate           [place]



               00                                 capsule) -           



                                                  220 mg           



                                                  Zinc           



                                                  sulfate =           



                                                  50 mg           



                                                  elemental           



                                                  zinc Same           



                                                  as Zinc           



                                                  Sulfate           

 

     ascorbic            No                       Notes:           [facility]



     acid      4-09                               (Same as:           l



               14:00:                               Vitamin C)           [facility]



               00                                                

 

     celecoxib            No                       Notes:           [facility]

a



               4-09                               NSAID.           l



               14:00:                               Please           [facility]



               00                                 check           



                                                  indication           



                                                  . Not for           



                                                  seizure.           



                                                  (Same As:           



                                                  CeleBREX)           

 

     Lidoderm 5%            No                       Notes:           [facility]

[facility]



     topical      -                               Apply only           l



     film      14:00:                               once for           [facility]



     (patch)      00                                 up to 12           



                                                  hours in a           



                                                  24-hour           



                                                  period (12           



                                                  hours on           



                                                  and 12           



                                                  hours           



                                                  off).           



                                                  (Same as:           



                                                  [employer]cre[facility]           



                                                  Lidocaine           



                                                  Patch)           



                                                  "Remove           



                                                  old patch           



                                                  before           



                                                  applicatio           



                                                  n of new           



                                                  patch"           

 

     zinc            No                       Notes:           [facility]



     sulfate      4-                               (Zinc           l



               14:00:                               sulfate           [facility]



               00                                 capsule) -           



                                                  220 mg           



                                                  Zinc           



                                                  sulfate =           



                                                  50 mg           



                                                  elemental           



                                                  zinc Same           



                                                  as Zinc           



                                                  Sulfate           

 

     ascorbic            No                       Notes:           [facility]



     acid      4-09                               (Same as:           l



               14:00:                               Vitamin C)           [facility]



                                                               

 

     celecoxib            No                       Notes:           [facility]

a



               4-09                               NSAID.           l



               14:00:                               Please           [facility]



               00                                 check           



                                                  indication           



                                                  . Not for           



                                                  seizure.           



                                                  (Same As:           



                                                  CeleBREX)           

 

     Lidoderm 5%            No                       Notes:           [facility]

[facility]



     topical      -09                               Apply only           l



     film      14:00:                               once for           [place]



     (patch)      00                                 up to 12           



                                                  hours in a           



                                                  24-hour           



                                                  period (12           



                                                  hours on           



                                                  and 12           



                                                  hours           



                                                  off).           



                                                  (Same as:           



                                                  [employer]           



                                                  Lidocaine           



                                                  Patch)           



                                                  "Remove           



                                                  old patch           



                                                  before           



                                                  applicatio           



                                                  n of new           



                                                  patch"           

 

     zinc            No                       Notes:           [facility]



     sulfate      4-09                               (Zinc           l



               14:00:                               sulfate           [place]



               00                                 capsule) -           



                                                  220 mg           



                                                  Zinc           



                                                  sulfate =           



                                                  50 mg           



                                                  elemental           



                                                  zinc Same           



                                                  as Zinc           



                                                  Sulfate           

 

     ascorbic            No                       Notes:           [facility]



     acid      4-09                               (Same as:           l



               14:00:                               Vitamin C)           [facility]



               00                                                

 

     celecoxib            No                       Notes:           [facility]

a



               4-09                               NSAID.           l



               14:00:                               Please           [place]



               00                                 check           



                                                  indication           



                                                  . Not for           



                                                  seizure.           



                                                  (Same As:           



                                                  CeleBREX)           

 

     Lidoderm 5%      0      No                       Notes:           [facility]

[facility]



     topical      4-09                               Apply only           l



     film      14:00:                               once for           [place]



     (patch)      00                                 up to 12           



                                                  hours in a           



                                                  24-hour           



                                                  period (12           



                                                  hours on           



                                                  and 12           



                                                  hours           



                                                  off).           



                                                  (Same as:           



                                                  [employer]           



                                                  Lidocaine           



                                                  Patch)           



                                                  "Remove           



                                                  old patch           



                                                  before           



                                                  applicatio           



                                                  n of new           



                                                  patch"           

 

     zinc            No                       Notes:           [facility]



     sulfate      4-09                               (Zinc           l



               14:00:                               sulfate           [place]



               00                                 capsule) -           



                                                  220 mg           



                                                  Zinc           



                                                  sulfate =           



                                                  50 mg           



                                                  elemental           



                                                  zinc Same           



                                                  as Zinc           



                                                  Sulfate           

 

     ascorbic            No                       Notes:           [facility]



     acid      4-09                               (Same as:           l



               14:00:                               Vitamin C)           [facility]



               00                                                

 

     celecoxib            No                       Notes:           [facility]

a



               4-09                               NSAID.           l



               14:00:                               Please           [place]



               00                                 check           



                                                  indication           



                                                  . Not for           



                                                  seizure.           



                                                  (Same As:           



                                                  CeleBREX)           

 

     Lidoderm 5%            No                       Notes:           [facility]

[facility]



     topical      4-09                               Apply only           l



     film      14:00:                               once for           [facility]



     (patch)      00                                 up to 12           



                                                  hours in a           



                                                  24-hour           



                                                  period (12           



                                                  hours on           



                                                  and 12           



                                                  hours           



                                                  off).           



                                                  (Same as:           



                                                  [employer]           



                                                  Lidocaine           



                                                  Patch)           



                                                  "Remove           



                                                  old patch           



                                                  before           



                                                  applicatio           



                                                  n of new           



                                                  patch"           

 

     zinc            No                       Notes:           [facility]



     sulfate      4-09                               (Zinc           l



               14:00:                               sulfate           [facility]



               00                                 capsule) -           



                                                  220 mg           



                                                  Zinc           



                                                  sulfate =           



                                                  50 mg           



                                                  elemental           



                                                  zinc Same           



                                                  as Zinc           



                                                  Sulfate           

 

     ascorbic            No                       Notes:           [facility]



     acid      4-09                               (Same as:           l



               14:00:                               Vitamin C)           [place]



               00                                                

 

     celecoxib      -0      No                       Notes:           [facility]

a



               4-09                               NSAID.           l



               14:00:                               Please           [place]



               00                                 check           



                                                  indication           



                                                  . Not for           



                                                  seizure.           



                                                  (Same As:           



                                                  CeleBREX)           

 

     Lidoderm 5%            No                       Notes:           [facility]

[facility]



     topical      4-09                               Apply only           l



     film      14:00:                               once for           [facility]



     (patch)      00                                 up to 12           



                                                  hours in a           



                                                  24-hour           



                                                  period (12           



                                                  hours on           



                                                  and 12           



                                                  hours           



                                                  off).           



                                                  (Same as:           



                                                  Aspercreme           



                                                  Lidocaine           



                                                  Patch)           



                                                  "Remove           



                                                  old patch           



                                                  before           



                                                  applicatio           



                                                  n of new           



                                                  patch"           

 

     zinc      -      No                       Notes:           [facility]



     sulfate      4-09                               (Zinc           l



               14:00:                               sulfate           [facility]



               00                                 capsule) -           



                                                  220 mg           



                                                  Zinc           



                                                  sulfate =           



                                                  50 mg           



                                                  elemental           



                                                  zinc Same           



                                                  as Zinc           



                                                  Sulfate           

 

     cefepime +            No                       Notes:           [facility]

[facility]



     sterile      -                               (Same As:           l



     water 10 mL      12:00:                               Maxipime)           [facility]

[facility]



               00                                 ***            



                                                  MEDICATION           



                                                  WASTE ***           



                                                  Product           



                                                  Size: 1000           



                                                  mg Product           



                                                  Wasted:           



                                                  _0__ mg           

 

     cefepime +            No                       Notes:           [facility]

[facility]



     sterile      -                               (Same As:           l



     water 10 mL      12:00:                               Maxipime)           [facility]

[facility]



               00                                 ***            



                                                  MEDICATION           



                                                  WASTE ***           



                                                  Product           



                                                  Size: 1000           



                                                  mg Product           



                                                  Wasted:           



                                                  _0__ mg           

 

     cefepime +            No                       Notes:           [facility]

[facility]



     sterile                                     (Same As:           l



     water 10 mL      12:00:                               Maxipime)           [facility]

[facility]



               00                                 ***            



                                                  MEDICATION           



                                                  WASTE ***           



                                                  Product           



                                                  Size: 1000           



                                                  mg Product           



                                                  Wasted:           



                                                  _0__ mg           

 

     cefepime +            No                       Notes:           [facility]

[facility]



     sterile                                     (Same As:           l



     water 10 mL      12:00:                               Maxipime)           [facility]

[facility]



               00                                 ***            



                                                  MEDICATION           



                                                  WASTE ***           



                                                  Product           



                                                  Size: 1000           



                                                  mg Product           



                                                  Wasted:           



                                                  _0__ mg           

 

     cefepime +            No                       Notes:           [facility]

[facility]



     sterile                                     (Same As:           l



     water 10 mL      12:00:                               Maxipime)           [facility]

[facility]



               00                                 ***            



                                                  MEDICATION           



                                                  WASTE ***           



                                                  Product           



                                                  Size: 1000           



                                                  mg Product           



                                                  Wasted:           



                                                  _0__ mg           

 

     cefepime +            No                       Notes:           [facility]

[facility]



     sterile                                     (Same As:           l



     water 10 mL      12:00:                               Maxipime)           [facility]

[facility]



               00                                 ***            



                                                  MEDICATION           



                                                  WASTE ***           



                                                  Product           



                                                  Size: 1000           



                                                  mg Product           



                                                  Wasted:           



                                                  _0__ mg           

 

     hydromorpho            No                       Notes:           [facility]

[facility]



     ne                                       (Same as:           l



               09:39:                               Dilaudid)           [facility]



                                                               

 

     hydromorpho            No                       Notes:           [facility]

[facility]



     ne        -                               (Same as:           l



               09:39:                               Dilaudid)           [facility]



                                                               

 

     hydromorpho            No                       Notes:           [facility]

[facility]



     ne        -                               (Same as:           l



               09:39:                               Dilaudid)           [facility]



                                                               

 

     hydromorpho            No                       Notes:           [facility]

[facility]



     ne        -                               (Same as:           l



               09:39:                               Dilaudid)           [facility]



                                                               

 

     hydromorpho            No                       Notes:           [facility]

[facility]



     ne        4-09                               (Same as:           l



               09:39:                               Dilaudid)           [facility]



               00                                                

 

     hydromorpho            No                       Notes:           [facility]

[facility]



     ne        4-09                               (Same as:           l



               09:39:                               Dilaudid)           [facility]



               00                                                

 

     methocarbam            No                       Notes:           [facility]

[facility]



     ol        4-09                               (Same           l



               05:00:                               as:Robaxin           [facility]



                                                )              

 

     methocarbam            No                       Notes:           [facility]

[facility]



     ol        4-09                               (Same           l



               05:00:                               as:Robaxin           [facility]



                                                )              

 

     methocarbam            No                       Notes:           [facility]

[facility]



     ol        4-09                               (Same           l



               05:00:                               as:Robaxin           [facility]



                                                )              

 

     methocarbam            No                       Notes:           [facility]

[facility]



     ol        4-09                               (Same           l



               05:00:                               as:Robaxin           [facility]                                 )              

 

     methocarbam            No                       Notes:           [facility]

[facility]



     ol        4-09                               (Same           l



               05:00:                               as:Robaxin           [facility]                                 )              

 

     methocarbam            No                       Notes:           [facility]

[facility]



     ol        4-09                               (Same           l



               05:00:                               as:Robaxin           [facility]



                                                )              

 

     docusate            No                       Notes:           [facility]



               4-09                               (Same as:           l



               02:00:                               Colace)           [facility]



                                                (Do Not           



                                                  Crush)           

 

     senna            No                       Notes:           [facility]



               4-09                               (Same as:           l



               02:00:                               Senokot)           [facility]                                                

 

     Saline            No                       Notes:           [facility]



     Flush 0.9%      4-09                               (Same as:           l



               02:00:                               BD             [facility]                                 Posiflush)           

 

     topiramate            No                       Notes:           [facility]

[facility]



               4-09                               (Same As:           l



               02:00:                               Topamax)           [facility]



               00                                 "Do Not           



                                                  Crush"           



                                                  Hazardous           



                                                  Drug Group           



                                                  3:Reproduc           



                                                  tive risk           



                                                  Hazardous           



                                                  Drug --           



                                                  Refer to           



                                                  safe           



                                                  handling           



                                                  procedure           



                                                  PPE Matrix           

 

     remove            No                       Notes:           [facility]



     patch      4-09                               Remove           l



               02:00:                               patch 12           [facility]



               00                                 hours           



                                                  after           



                                                  applicatio           



                                                  n each           



                                                  day.           

 

     docusate            No                       Notes:           [facility]



               4-09                               (Same as:           l



               02:00:                               Colace)           [facility]



               00                                 (Do Not           



                                                  Crush)           

 

     senna            No                       Notes:           [facility]



               4-09                               (Same as:           l



               02:00:                               Senokot)           [facility]



               00                                                

 

     Saline            No                       Notes:           [facility]



     Flush 0.9%      4-09                               (Same as:           l



               02:00:                               BD             [facility]



               00                                 Posiflush)           

 

     topiramate            No                       Notes:           [facility]

[facility]



               4-09                               (Same As:           l



               02:00:                               Topamax)           [facility]



               00                                 "Do Not           



                                                  Crush"           



                                                  Hazardous           



                                                  Drug Group           



                                                  3:Reproduc           



                                                  tive risk           



                                                  Hazardous           



                                                  Drug --           



                                                  Refer to           



                                                  safe           



                                                  handling           



                                                  procedure           



                                                  PPE Matrix           

 

     remove            No                       Notes:           [facility]



     patch      4-09                               Remove           l



               02:00:                               patch 12           [facility]



               00                                 hours           



                                                  after           



                                                  applicatio           



                                                  n each           



                                                  day.           

 

     docusate            No                       Notes:           [facility]



               4-09                               (Same as:           l



               02:00:                               Colace)           [facility]



               00                                 (Do Not           



                                                  Crush)           

 

     senna            No                       Notes:           [facility]



               4-09                               (Same as:           l



               02:00:                               Senokot)           [facility]



               00                                                

 

     Saline            No                       Notes:           [facility]



     Flush 0.9%      4-09                               (Same as:           l



               02:00:                               BD             [facility]



               00                                 Posiflush)           

 

     topiramate            No                       Notes:           [facility]

[facility]



               4-09                               (Same As:           l



               02:00:                               Topamax)           [facility]



               00                                 "Do Not           



                                                  Crush"           



                                                  Hazardous           



                                                  Drug Group           



                                                  3:Reproduc           



                                                  tive risk           



                                                  Hazardous           



                                                  Drug --           



                                                  Refer to           



                                                  safe           



                                                  handling           



                                                  procedure           



                                                  PPE Matrix           

 

     remove            No                       Notes:           [facility]



     patch      4-09                               Remove           l



               02:00:                               patch 12           [facility]



               00                                 hours           



                                                  after           



                                                  applicatio           



                                                  n each           



                                                  day.           

 

     docusate            No                       Notes:           [facility]



               4-09                               (Same as:           l



               02:00:                               Colace)           [facility]



               00                                 (Do Not           



                                                  Crush)           

 

     senna            No                       Notes:           [facility]



               4-09                               (Same as:           l



               02:00:                               Senokot)           [facility]



               00                                                

 

     Saline            No                       Notes:           [facility]



     Flush 0.9%      4-09                               (Same as:           l



               02:00:                               BD             [facility]



               00                                 Posiflush)           

 

     topiramate            No                       Notes:           [facility]

[facility]



               4-09                               (Same As:           l



               02:00:                               Topamax)           [facility]



               00                                 "Do Not           



                                                  Crush"           



                                                  Hazardous           



                                                  Drug Group           



                                                  3:Reproduc           



                                                  tive risk           



                                                  Hazardous           



                                                  Drug --           



                                                  Refer to           



                                                  safe           



                                                  handling           



                                                  procedure           



                                                  PPE Matrix           

 

     remove            No                       Notes:           [facility]



     patch      4-09                               Remove           l



               02:00:                               patch 12           [facility]



               00                                 hours           



                                                  after           



                                                  applicatio           



                                                  n each           



                                                  day.           

 

     docusate            No                       Notes:           [facility]



               4-09                               (Same as:           l



               02:00:                               Colace)           [facility]



               00                                 (Do Not           



                                                  Crush)           

 

     senna            No                       Notes:           [facility]



               4-09                               (Same as:           l



               02:00:                               Senokot)           [facility]



               00                                                

 

     Saline            No                       Notes:           [facility]



     Flush 0.9%      4-09                               (Same as:           l



               02:00:                               BD             [facility]



               00                                 Posiflush)           

 

     topiramate            No                       Notes:           [facility]

[facility]



               4-09                               (Same As:           l



               02:00:                               Topamax)           [facility]



               00                                 "Do Not           



                                                  Crush"           



                                                  Hazardous           



                                                  Drug Group           



                                                  3:Reproduc           



                                                  tive risk           



                                                  Hazardous           



                                                  Drug --           



                                                  Refer to           



                                                  safe           



                                                  handling           



                                                  procedure           



                                                  PPE Matrix           

 

     remove            No                       Notes:           [facility]



     patch      4-09                               Remove           l



               02:00:                               patch 12           [facility]



               00                                 hours           



                                                  after           



                                                  applicatio           



                                                  n each           



                                                  day.           

 

     docusate            No                       Notes:           [facility]



               4-09                               (Same as:           l



               02:00:                               Colace)           [facility]



               00                                 (Do Not           



                                                  Crush)           

 

     senna            No                       Notes:           [facility]



               4-09                               (Same as:           l



               02:00:                               Senokot)           [facility]



               00                                                

 

     Saline            No                       Notes:           [facility]



     Flush 0.9%      4-09                               (Same as:           l



               02:00:                               BD             [facility]



               00                                 Posiflush)           

 

     topiramate            No                       Notes:           [facility]

ia



               4-09                               (Same As:           l



               02:00:                               Topamax)           [facility]



               00                                 "Do Not           



                                                  Crush"           



                                                  Hazardous           



                                                  Drug Group           



                                                  3:Reproduc           



                                                  tive risk           



                                                  Hazardous           



                                                  Drug --           



                                                  Refer to           



                                                  safe           



                                                  handling           



                                                  procedure           



                                                  PPE Matrix           

 

     remove            No                       Notes:           [facility]



     patch      4-09                               Remove           l



               02:00:                               patch 12           [facility]



               00                                 hours           



                                                  after           



                                                  applicatio           



                                                  n each           



                                                  day.           

 

     acetaminoph            No                       Notes: Max           

[facility]



     en        4-09                               acetaminop           l



               01:00:                               hen 4000           [facility]



               00                                 mg/day (4           



                                                  gm/day).           



                                                  (Same as:           



                                                  Tylenol           



                                                  Extra           



                                                  Strength)           

 

     acetaminoph            No                       Notes: Max           

[facility]



     en        4-09                               acetaminop           l



               01:00:                               hen 4000           [facility]



               00                                 mg/day (4           



                                                  gm/day).           



                                                  (Same as:           



                                                  Tylenol           



                                                  Extra           



                                                  Strength)           

 

     acetaminoph            No                       Notes: Max           

[facility]



     en        4-09                               acetaminop           l



               01:00:                               hen 4000           [facility]



               00                                 mg/day (4           



                                                  gm/day).           



                                                  (Same as:           



                                                  Tylenol           



                                                  Extra           



                                                  Strength)           

 

     acetaminoph            No                       Notes: Max           

[facility]



     en        -                               acetaminop           l



               01:00:                               hen 4000           [facility]



               00                                 mg/day (4           



                                                  gm/day).           



                                                  (Same as:           



                                                  Tylenol           



                                                  Extra           



                                                  Strength)           

 

     acetaminoph            No                       Notes: Max           

[facility]



     en        -                               acetaminop           l



               01:00:                               hen 4000           [facility]



               00                                 mg/day (4           



                                                  gm/day).           



                                                  (Same as:           



                                                  Tylenol           



                                                  Extra           



                                                  Strength)           

 

     acetaminoph            No                       Notes: Max           

[facility]



     en        4-                               acetaminop           l



               01:00:                               hen 4000           [facility]



               00                                 mg/day (4           



                                                  gm/day).           



                                                  (Same as:           



                                                  Tylenol           



                                                  Extra           



                                                  Strength)           

 

     oxyCODONE            No                       Notes:           [facility]

a



     immediate      -                               (Same as:           l



     release      00:09:                               Roxicodone           [facility]

[facility]



               00                                 )              

 

     acetaminoph            No                       Notes: Do           M

[facility]



     en-hydrocod                                     not exceed           l



     one 325      00:09:                               4gm/day of           [facility]

[facility]



     mg-10 mg      00                                 acetaminop           



     oral tablet                                         hen. (Same           



                                                  as: Norco           



                                                  325/10)           

 

     oxyCODONE            No                       Notes:           [facility]

a



     immediate      -                               (Same as:           l



     release      00:09:                               Roxicodone           [facility]

[facility]



               00                                 )              

 

     acetaminoph            No                       Notes: Do           [facility]

[facility]



     en-hydrocod      -                               not exceed           l



     one 325      00:09:                               4gm/day of           [facility]

[facility]



     mg-10 mg      00                                 acetaminop           



     oral tablet                                         hen. (Same           



                                                  as: Norco           



                                                  325/10)           

 

     oxyCODONE      0      No                       Notes:           [facility]

a



     immediate      -                               (Same as:           l



     release      00:09:                               Roxicodone           [facility]

[facility]



               00                                 )              

 

     acetaminoph            No                       Notes: Do           M

[facility]



     en-hydrocod      4-                               not exceed           l



     one 325      00:09:                               4gm/day of           [facility]

[facility]



     mg-10 mg      00                                 acetaminop           



     oral tablet                                         hen. (Same           



                                                  as: Norco           



                                                  325/10)           

 

     oxyCODONE            No                       Notes:           [facility]

a



     immediate      4-09                               (Same as:           l



     release      00:09:                               Roxicodone           [facility]

[facility]



               00                                 )              

 

     acetaminoph            No                       Notes: Do           M

[facility]



     en-hydrocod      4-09                               not exceed           l



     one 325      00:09:                               4gm/day of           [facility]

[facility]



     mg-10 mg      00                                 acetaminop           



     oral tablet                                         hen. (Same           



                                                  as: Norco           



                                                  325/10)           

 

     oxyCODONE            No                       Notes:           [facility]

a



     immediate      4-09                               (Same as:           l



     release      00:09:                               Roxicodone           [facility]

[facility]



               00                                 )              

 

     acetaminoph            No                       Notes: Do           [facility]

[facility]



     en-hydrocod      4-09                               not exceed           l



     one 325      00:09:                               4gm/day of           [facility]

[facility]



     mg-10 mg      00                                 acetaminop           



     oral tablet                                         hen. (Same           



                                                  as: Norco           



                                                  325/10)           

 

     oxyCODONE            No                       Notes:           [facility]

a



     immediate      4-09                               (Same as:           l



     release      00:09:                               Roxicodone           [facility]

[facility]



               00                                 )              

 

     acetaminoph            No                       Notes: Do           M

[facility]



     en-hydrocod      4-09                               not exceed           l



     one 325      00:09:                               4gm/day of           [facility]

[facility]



     mg-10 mg      00                                 acetaminop           



     oral tablet                                         hen. (Same           



                                                  as: Norco           



                                                  325/10)           

 

     LORazepam            No                       Notes:           [facility]

a



               4-09                               (Same as:           l



               00:06:                               Ativan)           [facility]



                                                               

 

     oxyCODONE            No                       Notes:           [facility]

a



     immediate      4-09                               (Same as:           l



     release      00:06:                               Roxicodone           [facility]

[facility]



               00                                 )              

 

     LORazepam            No                       Notes:           [facility]

a



               4-09                               (Same as:           l



               00:06:                               Ativan)           [facility]



                                                               

 

     oxyCODONE            No                       Notes:           [facility]

a



     immediate      4-09                               (Same as:           l



     release      00:06:                               Roxicodone           [facility]

[facility]



               00                                 )              

 

     LORazepam            No                       Notes:           [facility]

a



               4-09                               (Same as:           l



               00:06:                               Ativan)           [facility]



                                                               

 

     oxyCODONE            No                       Notes:           [facility]

a



     immediate      4-09                               (Same as:           l



     release      00:06:                               Roxicodone           [facility]

[facility]



               00                                 )              

 

     LORazepam            No                       Notes:           [facility]

a



               4-09                               (Same as:           l



               00:06:                               Ativan)           [facility]



                                                               

 

     oxyCODONE            No                       Notes:           [facility]

a



     immediate      4-09                               (Same as:           l



     release      00:06:                               Roxicodone           [facility]

[facility]



                                                )              

 

     LORazepam            No                       Notes:           [facility]

a



               4-09                               (Same as:           l



               00:06:                               Ativan)           [facility]



                                                               

 

     oxyCODONE            No                       Notes:           [facility]

a



     immediate      4-09                               (Same as:           l



     release      00:06:                               Roxicodone           [facility]

[facility]



                                                )              

 

     LORazepam            No                       Notes:           [facility]

a



               4-09                               (Same as:           l



               00:06:                               Ativan)           [facility]



                                                               

 

     oxyCODONE            No                       Notes:           [facility]

a



     immediate      4-                               (Same as:           l



     release      00:06:                               Roxicodone           [facility]

[facility]



                                                )              

 

     Sodium            No                       1,000 mL,           [facility]

a



     Chloride                                     Rate: 75           l



     0.9% IV      23:43:                               ml/hr,           [facility]



     1,000 mL      00                                 Infuse           



                                                  over: 13.3           



                                                  hr, Route:           



                                                  IV, Dosing           



                                                  Weight           



                                                  127.727           



                                                  kg, Total           



                                                  Volume:           



                                                  1,000,           



                                                  Start           



                                                  date:           



                                                  22           



                                                  18:43:00           



                                                  CDT,           



                                                  Duration:           



                                                  30 day,           



                                                  Stop date:           



                                                  22           



                                                  18:42:00           



                                                  CDT, BSA:           



                                                  2.37 m2, 0           

 

     acetaminoph            No                       Notes: Do           [facility]

[facility]



     en        -08                               not exceed           l



               23:43:                               4 gm/day.           [facility]



               00                                 (Same as:           



                                                  Tylenol)           

 

     acetaminoph            No                       Notes:           [facility]

[facility]



     en-hydrocod      4-08                               (Same as:           l



     one 325      23:43:                               Norco           [facility]



     mg-5 mg      00                                 325/5) Do           



     oral tablet                                         not exceed           



                                                  4gm/day of           



                                                  acetaminop           



                                                  hen.           

 

     acetaminoph            No                       Notes: Do           [facility]

[facility]



     en-hydrocod      4-08                               not exceed           l



     one 325      23:43:                               4gm/day of           [facility]

[facility]



     mg-10 mg      00                                 acetaminop           



     oral tablet                                         hen. (Same           



                                                  as: Norco           



                                                  325/10)           

 

     bisacodyl            No                       Notes:           [facility]

a



               4-08                               (Same As:           l



               23:43:                               Dulcolax,           [facility]



               00                                 Bisco-Lax)           

 

     hydrALAZINE            No                       Notes:           [facility]

[facility]



               4-08                               (Same as:           l



               23:43:                               Apresoline           [facility]



                                                ) Push           



                                                  over 5           



                                                  minutes           

 

     labetalol            No                       10 mg, 2           [facility]

[facility]



               4-08                               mL, Route:           l



               23:43:                               IVP, Drug                                            form: INJ,           



                                                  Q15Min,           



                                                  Dosing           



                                                  Weight           



                                                  127.727,           



                                                  kg, Start           



                                                  date:           



                                                  22           



                                                  18:43:00           



                                                  CDT,           



                                                  Duration:           



                                                  3 doses or           



                                                  times,           



                                                  Stop date:           



                                                  22           



                                                  19:13:00           



                                                  CDT, 0           

 

     Saline            No                       Notes:           [facility]



     Flush 0.9%      4-08                               (Same as:           l



               23:43:                               BD             [facility]                                 Posiflush)           

 

     Sodium            No                       1,000 mL,           [facility]

a



     Chloride      -08                               Rate: 75           l



     0.9% IV      23:43:                               ml/hr,           [facility]



     1,000 mL      00                                 Infuse           



                                                  over: 13.3           



                                                  hr, Route:           



                                                  IV, Dosing           



                                                  Weight           



                                                  127.727           



                                                  kg, Total           



                                                  Volume:           



                                                  1,000,           



                                                  Start           



                                                  date:           



                                                  22           



                                                  18:43:00           



                                                  CDT,           



                                                  Duration:           



                                                  30 day,           



                                                  Stop date:           



                                                  22           



                                                  18:42:00           



                                                  CDT, BSA:           



                                                  2.37 m2, 0           

 

     acetaminoph            No                       Notes: Do           [facility]

[facility]



     en        4-08                               not exceed           l



               23:43:                               4 gm/day.           [facility]



               00                                 (Same as:           



                                                  Tylenol)           

 

     acetaminoph            No                       Notes:           [facility]

[facility]



     en-hydrocod      4-08                               (Same as:           l



     one 325      23:43:                               Norco           [facility]



     mg-5 mg      00                                 325/5) Do           



     oral tablet                                         not exceed           



                                                  4gm/day of           



                                                  acetaminop           



                                                  hen.           

 

     acetaminoph            No                       Notes: Do           M

[facility]



     en-hydrocod      4-08                               not exceed           l



     one 325      23:43:                               4gm/day of           [facility]

[place]



     mg-10 mg      00                                 acetaminop           



     oral tablet                                         hen. (Same           



                                                  as: Norco           



                                                  325/10)           

 

     bisacodyl            No                       Notes:           [facility]

a



               4-08                               (Same As:           l



               23:43:                               Dulcolax,           [place]



               00                                 Bisco-Lax)           

 

     hydrALAZINE            No                       Notes:           [facility]

ra[facility]ev



               4-08                               (Same as:           l



               23:43:                               Apresoline           [facility]



               00                                 ) Push           



                                                  over 5           



                                                  minutes           

 

     labetalol            No                       10 mg, 2           [facility]

[facility]ev



               4-08                               mL, Route:           l



               23:43:                               IVP, Drug                                            form: INJ,           



                                                  Q15Min,           



                                                  Dosing           



                                                  Weight           



                                                  127.727,           



                                                  kg, Start           



                                                  date:           



                                                  22           



                                                  18:43:00           



                                                  CDT,           



                                                  Duration:           



                                                  3 doses or           



                                                  times,           



                                                  Stop date:           



                                                  22           



                                                  19:13:00           



                                                  CDT, 0           

 

     Saline            No                       Notes:           [facility]



     Flush 0.9%      4-08                               (Same as:           l



               23:43:                               BD                                              Posiflush)           

 

     Sodium            No                       1,000 mL,           [facility]

a



     Chloride      4-08                               Rate: 75           l



     0.9% IV      23:43:                               ml/hr,           [place]



     1,000 mL      00                                 Infuse           



                                                  over: 13.3           



                                                  hr, Route:           



                                                  IV, Dosing           



                                                  Weight           



                                                  127.727           



                                                  kg, Total           



                                                  Volume:           



                                                  1,000,           



                                                  Start           



                                                  date:           



                                                  22           



                                                  18:43:00           



                                                  CDT,           



                                                  Duration:           



                                                  30 day,           



                                                  Stop date:           



                                                  22           



                                                  18:42:00           



                                                  CDT, BSA:           



                                                  2.37 m2, 0           

 

     acetaminoph            No                       Notes: Do           M

[facility]



     en        4-08                               not exceed           l



               23:43:                               4 gm/day.           [facility]



                                                (Same as:           



                                                  Tylenol)           

 

     acetaminoph            No                       Notes:           [facility]

[facility]



     en-hydrocod      4-08                               (Same as:           l



     one 325      23:43:                               Norco           [facility]



     mg-5 mg      00                                 325/5) Do           



     oral tablet                                         not exceed           



                                                  4gm/day of           



                                                  acetaminop           



                                                  hen.           

 

     acetaminoph            No                       Notes: Do           M

[facility]



     en-hydrocod      4-08                               not exceed           l



     one 325      23:43:                               4gm/day of           [facility]

[place]



     mg-10 mg      00                                 acetaminop           



     oral tablet                                         hen. (Same           



                                                  as: Norco           



                                                  325/10)           

 

     bisacodyl            No                       Notes:           [facility]

a



               4-08                               (Same As:           l



               23:43:                               Dulcolax,           [place]



                                                Bisco-Lax)           

 

     hydrALAZINE            No                       Notes:           [facility]

[facility]



               4-08                               (Same as:           l



               23:43:                               Apresoline                                            ) Push           



                                                  over 5           



                                                  minutes           

 

     labetalol      2022-0      No                       10 mg, 2           [facility]

[facility]



               4-08                               mL, Route:           l



               23:43:                               IVP, Drug                                            form: INJ,           



                                                  Q15Min,           



                                                  Dosing           



                                                  Weight           



                                                  127.727,           



                                                  kg, Start           



                                                  date:           



                                                  22           



                                                  18:43:00           



                                                  CDT,           



                                                  Duration:           



                                                  3 doses or           



                                                  times,           



                                                  Stop date:           



                                                  22           



                                                  19:13:00           



                                                  CDT, 0           

 

     Saline            No                       Notes:           [facility]



     Flush 0.9%      408                               (Same as:           l



               23:43:                               BD             [facility]                                 Posiflush)           

 

     Sodium            No                       1,000 mL,           [facility]

a



     Chloride      08                               Rate: 75           l



     0.9% IV      23:43:                               ml/hr,           [facility]



     1,000 mL      00                                 Infuse           



                                                  over: 13.3           



                                                  hr, Route:           



                                                  IV, Dosing           



                                                  Weight           



                                                  127.727           



                                                  kg, Total           



                                                  Volume:           



                                                  1,000,           



                                                  Start           



                                                  date:           



                                                  22           



                                                  18:43:00           



                                                  CDT,           



                                                  Duration:           



                                                  30 day,           



                                                  Stop date:           



                                                  22           



                                                  18:42:00           



                                                  CDT, BSA:           



                                                  2.37 m2, 0           

 

     acetaminoph            No                       Notes: Do           M

[facility]



     en        4-08                               not exceed           l



               23:43:                               4 gm/day.           [facility]



               00                                 (Same as:           



                                                  Tylenol)           

 

     acetaminoph            No                       Notes:           [facility]

[facility]



     en-hydrocod      4-08                               (Same as:           l



     one 325      23:43:                               Norco           [facility]



     mg-5 mg      00                                 325/5) Do           



     oral tablet                                         not exceed           



                                                  4gm/day of           



                                                  acetaminop           



                                                  hen.           

 

     acetaminoph            No                       Notes: Do           M

[facility]



     en-hydrocod      4-08                               not exceed           l



     one 325      23:43:                               4gm/day of           [facility]

[place]



     mg-10 mg      00                                 acetaminop           



     oral tablet                                         hen. (Same           



                                                  as: Norco           



                                                  325/10)           

 

     bisacodyl            No                       Notes:           [facility]

a



               4-08                               (Same As:           l



               23:43:                               Dulcolax,           [place]



                                                Bisco-Lax)           

 

     hydrALAZINE            No                       Notes:           [facility]

[facility]



               4-08                               (Same as:           l



               23:43:                               Apresoline                                            ) Push           



                                                  over 5           



                                                  minutes           

 

     labetalol            No                       10 mg, 2           [facility]

[facility]



               4-08                               mL, Route:           l



               23:43:                               IVP, Drug                                            form: INJ,           



                                                  Q15Min,           



                                                  Dosing           



                                                  Weight           



                                                  127.727,           



                                                  kg, Start           



                                                  date:           



                                                  22           



                                                  18:43:00           



                                                  CDT,           



                                                  Duration:           



                                                  3 doses or           



                                                  times,           



                                                  Stop date:           



                                                  22           



                                                  19:13:00           



                                                  CDT, 0           

 

     Saline            No                       Notes:           [facility]



     Flush 0.9%      4-08                               (Same as:           l



               23:43:                               BD             [place]                                 Posiflush)           

 

     Sodium            No                       1,000 mL,           [facility]

a



     Chloride      4-08                               Rate: 75           l



     0.9% IV      23:43:                               ml/hr,           [place]



     1,000 mL      00                                 Infuse           



                                                  over: 13.3           



                                                  hr, Route:           



                                                  IV, Dosing           



                                                  Weight           



                                                  127.727           



                                                  kg, Total           



                                                  Volume:           



                                                  1,000,           



                                                  Start           



                                                  date:           



                                                  22           



                                                  18:43:00           



                                                  CDT,           



                                                  Duration:           



                                                  30 day,           



                                                  Stop date:           



                                                  22           



                                                  18:42:00           



                                                  CDT, BSA:           



                                                  2.37 m2, 0           

 

     acetaminoph            No                       Notes: Do           M

[facility]



     en        4-08                               not exceed           l



               23:43:                               4 gm/day.           [facility]



                                                (Same as:           



                                                  Tylenol)           

 

     acetaminoph            No                       Notes:           [facility]

[facility]



     en-hydrocod      4-08                               (Same as:           l



     one 325      23:43:                               Norco           [facility]



     mg-5 mg      00                                 325/5) Do           



     oral tablet                                         not exceed           



                                                  4gm/day of           



                                                  acetaminop           



                                                  hen.           

 

     acetaminoph            No                       Notes: Do           M

[facility]



     en-hydrocod      4-08                               not exceed           l



     one 325      23:43:                               4gm/day of           [facility]

[place]



     mg-10 mg      00                                 acetaminop           



     oral tablet                                         hen. (Same           



                                                  as: Norco           



                                                  325/10)           

 

     bisacodyl            No                       Notes:           [facility]

a



               4-08                               (Same As:           l



               23:43:                               Dulcolax,           [facility]



                                                Bisco-Lax)           

 

     hydrALAZINE            No                       Notes:           [facility]

[facility]



               4-08                               (Same as:           l



               23:43:                               Apresoline                                            ) Push           



                                                  over 5           



                                                  minutes           

 

     labetalol            No                       10 mg, 2           [facility]

[facility]



               4-08                               mL, Route:           l



               23:43:                               IVP, Drug                                            form: INJ,           



                                                  Q15Min,           



                                                  Dosing           



                                                  Weight           



                                                  127.727,           



                                                  kg, Start           



                                                  date:           



                                                  22           



                                                  18:43:00           



                                                  CDT,           



                                                  Duration:           



                                                  3 doses or           



                                                  times,           



                                                  Stop date:           



                                                  22           



                                                  19:13:00           



                                                  CDT, 0           

 

     Saline            No                       Notes:           [facility]



     Flush 0.9%      4-08                               (Same as:           l



               23:43:                               BD             [place]                                 Posiflush)           

 

     Sodium            No                       1,000 mL,           [facility]

a



     Chloride      4-08                               Rate: 75           l



     0.9% IV      23:43:                               ml/hr,           [place]



     1,000 mL      00                                 Infuse           



                                                  over: 13.3           



                                                  hr, Route:           



                                                  IV, Dosing           



                                                  Weight           



                                                  127.727           



                                                  kg, Total           



                                                  Volume:           



                                                  1,000,           



                                                  Start           



                                                  date:           



                                                  22           



                                                  18:43:00           



                                                  CDT,           



                                                  Duration:           



                                                  30 day,           



                                                  Stop date:           



                                                  22           



                                                  18:42:00           



                                                  CDT, BSA:           



                                                  2.37 m2, 0           

 

     acetaminoph            No                       Notes: Do           M

[facility]



     en        4-08                               not exceed           l



               23:43:                               4 gm/day.           [place]



               00                                 (Same as:           



                                                  Tylenol)           

 

     acetaminoph            No                       Notes:           [facility]

[facility]



     en-hydrocod      4-08                               (Same as:           l



     one 325      23:43:                               Norco           [place]



     mg-5 mg      00                                 325/5) Do           



     oral tablet                                         not exceed           



                                                  4gm/day of           



                                                  acetaminop           



                                                  hen.           

 

     acetaminoph            No                       Notes: Do           M

[facility]



     en-hydrocod      4-08                               not exceed           l



     one 325      23:43:                               4gm/day of           [facility]

[facility]



     mg-10 mg      00                                 acetaminop           



     oral tablet                                         hen. (Same           



                                                  as: Norco           



                                                  325/10)           

 

     bisacodyl            No                       Notes:           [facility]

a



               4-08                               (Same As:           l



               23:43:                               Dulcolax,           [place]



                                                Bisco-Lax)           

 

     hydrALAZINE            No                       Notes:           [facility]

[facility]



               4-08                               (Same as:           l



               23:43:                               Apresoline                                            ) Push           



                                                  over 5           



                                                  minutes           

 

     labetalol            No                       10 mg, 2           [facility]

rajeev



               4-08                               mL, Route:           l



               23:43:                               IVP, Drug                                            form: INJ,           



                                                  Q15Min,           



                                                  Dosing           



                                                  Weight           



                                                  127.727,           



                                                  kg, Start           



                                                  date:           



                                                  22           



                                                  18:43:00           



                                                  CDT,           



                                                  Duration:           



                                                  3 doses or           



                                                  times,           



                                                  Stop date:           



                                                  22           



                                                  19:13:00           



                                                  CDT, 0           

 

     Saline      -0      No                       Notes:           [facility]



     Flush 0.9%      08                               (Same as:           l



               23:43:                               BD             [place]



               00                                 Posiflush)           

 

     NaCl 0.9%      2022- No             1000mL      at 999           Uni

vers



     (NS) bolus                                mL/hr,           ity of



     infusion      05:00: 05:59                          1,000 mL,           Eric

as



     1,000 mL      00   :00                           IV             Medical



                                                  Piggyback,           Branch



                                                  ONCE, 1           



                                                  dose, On           



                                                  22           



                                                  at 0000,           



                                                  STAT           

 

     diphenhydrA      2022- No             25mg      25 mg,           Uni

vers



     MINE                                Slow IV           ity of



     (BENADRYL)      05:00: 04:47                          Push,           [place]



     injection      00   :00                           ONCE, 1           Medical



     25 mg                                         dose, On           Branch



                                                  Mon 22           



                                                  at 0000,           



                                                  STAT           

 

     haloperidol      2022- No             2.5mg      2.5 mg,           U

nivers



     lactate                                Intravenou           ity o

f



     (HALDOL)      05:00: 04:47                          s, ONCE, 1           [facility]

[place]



     injection      00   :00                           dose, On           [facility]

l



     2.5 mg                                         Mon 22           Branch



                                                  at 0000,           



                                                  STAT           

 

     topiramate            Yes                      25 mg = 1           Me

[facility]



     25 mg oral      3-29                               cap, PO,           l



     capsule      13:44:                               Q12H, # 60           [facility]

[place]



               00                                 cap, 0           



                                                  Refill(s),           



                                                  Pharmacy:           



                                                  [facility]           



                                                  cy #[id number],           



                                                  149.86,           



                                                  cm,            



                                                  22           



                                                  1:18:00           



                                                  CDT,           



                                                  Height,           



                                                  127.727,           



                                                  kg,            



                                                  22           



                                                  1:18:00           



                                                  CDT,           



                                                  Weight           

 

     topiramate      -0      Yes                      25 mg = 1           Me

moria



     25 mg oral      3-29                               cap, PO,           l



     capsule      13:44:                               Q12H, # 60           [facility]

[place]



               00                                 cap, 0           



                                                  Refill(s),           



                                                  Pharmacy:           



                                                  [facility]           



                                                  cy #[id number],           



                                                  149.86,           



                                                  cm,            



                                                  22           



                                                  1:18:00           



                                                  CDT,           



                                                  Height,           



                                                  127.727,           



                                                  kg,            



                                                  22           



                                                  1:18:00           



                                                  CDT,           



                                                  Weight           

 

     topiramate      -0      Yes                      25 mg = 1           Me

moria



     25 mg oral      3-29                               cap, PO,           l



     capsule      13:44:                               Q12H, # 60           [facility]

[place]



               00                                 cap, 0           



                                                  Refill(s),           



                                                  Pharmacy:           



                                                  [facility]           



                                                  cy #[id number],           



                                                  149.86,           



                                                  cm,            



                                                  22           



                                                  1:18:00           



                                                  CDT,           



                                                  Height,           



                                                  127.727,           



                                                  kg,            



                                                  22           



                                                  1:18:00           



                                                  CDT,           



                                                  Weight           

 

     topiramate      -0      Yes                      25 mg = 1           Me

moria



     25 mg oral      3-29                               cap, PO,           l



     capsule      13:44:                               Q12H, # 60           [facility]

[place]



               00                                 cap, 0           



                                                  Refill(s),           



                                                  Pharmacy:           



                                                  [facility]           



                                                  cy #[id number],           



                                                  149.86,           



                                                  cm,            



                                                  22           



                                                  1:18:00           



                                                  CDT,           



                                                  Height,           



                                                  127.727,           



                                                  kg,            



                                                  22           



                                                  1:18:00           



                                                  CDT,           



                                                  Weight           

 

     topiramate      -0      Yes                      25 mg = 1           Me

moria



     25 mg oral      3-29                               cap, PO,           l



     capsule      13:44:                               Q12H, # 60           [facility]

[place]



               00                                 cap, 0           



                                                  Refill(s),           



                                                  Pharmacy:           



                                                  [facility]           



                                                  cy #[id number],           



                                                  149.86,           



                                                  cm,            



                                                  22           



                                                  1:18:00           



                                                  CDT,           



                                                  Height,           



                                                  127.727,           



                                                  kg,            



                                                  22           



                                                  1:18:00           



                                                  CDT,           



                                                  Weight           

 

     topiramate      -0      Yes                      25 mg = 1           Me

moria



     25 mg oral      3-29                               cap, PO,           l



     capsule      13:44:                               Q12H, # 60           [facility]

[place]



               00                                 cap, 0           



                                                  Refill(s),           



                                                  Pharmacy:           



                                                  [facility] #[id number],           



                                                  149.86,           



                                                  cm,            



                                                  22           



                                                  1:18:00           



                                                  CDT,           



                                                  Height,           



                                                  127.727,           



                                                  kg,            



                                                  22           



                                                  1:18:00           



                                                  CDT,           



                                                  Weight           

 

     topiramate      -0      No                       Notes:           [facility]

ia



               3-28                               Hazardous           l



               22:05:                               Drug Group           [facility]



               00                                 3:Reproduc           



                                                  tive risk           



                                                  Hazardous           



                                                  Drug --           



                                                  Refer to           



                                                  safe           



                                                  handling           



                                                  procedure           



                                                  PPE Matrix           



                                                  Sprinkle           



                                                  formulatio           



                                                  n. (Same           



                                                  As:            



                                                  Topamax)           

 

     topiramate      0      No                       Notes:           [facility]

ia



               3-28                               Hazardous           l



               22:05:                               Drug Group           [place]



               00                                 3:Reproduc           



                                                  tive risk           



                                                  Hazardous           



                                                  Drug --           



                                                  Refer to           



                                                  safe           



                                                  handling           



                                                  procedure           



                                                  PPE Matrix           



                                                  Sprinkle           



                                                  formulatio           



                                                  n. (Same           



                                                  As:            



                                                  Topamax)           

 

     topiramate            No                       Notes:           [facility]

ia



               3-28                               Hazardous           l



               22:05:                               Drug Group           [place]



               00                                 3:Reproduc           



                                                  tive risk           



                                                  Hazardous           



                                                  Drug --           



                                                  Refer to           



                                                  safe           



                                                  handling           



                                                  procedure           



                                                  PPE Matrix           



                                                  Sprinkle           



                                                  formulatio           



                                                  n. (Same           



                                                  As:            



                                                  Topamax)           

 

     topiramate            No                       Notes:           [facility]

ia



               3-28                               Hazardous           l



               22:05:                               Drug Group           [facility]



               00                                 3:Reproduc           



                                                  tive risk           



                                                  Hazardous           



                                                  Drug --           



                                                  Refer to           



                                                  safe           



                                                  handling           



                                                  procedure           



                                                  PPE Matrix           



                                                  Sprinkle           



                                                  formulatio           



                                                  n. (Same           



                                                  As:            



                                                  Topamax)           

 

     topiramate            No                       Notes:           [facility]

ia



               3-28                               Hazardous           l



               22:05:                               Drug Group           [place]



                                                3:Reproduc           



                                                  tive risk           



                                                  Hazardous           



                                                  Drug --           



                                                  Refer to           



                                                  safe           



                                                  handling           



                                                  procedure           



                                                  PPE Matrix           



                                                  Sprinkle           



                                                  formulatio           



                                                  n. (Same           



                                                  As:            



                                                  Topamax)           

 

     topiramate            No                       Notes:           [facility]

ia



               3-28                               Hazardous           l



               22:05:                               Drug Group           [place]



                                                3:Reproduc           



                                                  tive risk           



                                                  Hazardous           



                                                  Drug --           



                                                  Refer to           



                                                  safe           



                                                  handling           



                                                  procedure           



                                                  PPE Matrix           



                                                  Sprinkle           



                                                  formulatio           



                                                  n. (Same           



                                                  As:            



                                                  Topamax)           

 

     Yordan            No                       Notes:           [facility]



     packet      3-28                               (Same as:           l



               21:30:                               Yordan           [facility]



               00                                 Unflavored           



                                                  )              



                                                  Administer           



                                                  ing YORDAN           



                                                  orally:           



                                                  Mix into           



                                                  8-10 fl oz           



                                                  of room           



                                                  temperatur           



                                                  e juice,           



                                                  soda, or           



                                                  water.           



                                                  Also mixes           



                                                  well with           



                                                  warm           



                                                  beverages,           



                                                  like           



                                                  coffee or           



                                                  tea. Can           



                                                  be mixed           



                                                  with           



                                                  applesauce           



                                                  .              



                                                  Thoroughly           



                                                  stir for           



                                                  30-60           



                                                  seconds or           



                                                  until           



                                                  completely           



                                                  dissolved           

 

           No                       Notes:           [facility]



     packet      3-28                               (Same as:           l



               21:30:                               Yordan           [facility]



               00                                 Unflavored           



                                                  )              



                                                  Administer           



                                                  ing YORDAN           



                                                  orally:           



                                                  Mix into           



                                                  8-10 fl oz           



                                                  of room           



                                                  temperatur           



                                                  e juice,           



                                                  soda, or           



                                                  water.           



                                                  Also mixes           



                                                  well with           



                                                  warm           



                                                  beverages,           



                                                  like           



                                                  coffee or           



                                                  tea. Can           



                                                  be mixed           



                                                  with           



                                                  applesauce           



                                                  .              



                                                  Thoroughly           



                                                  stir for           



                                                  30-60           



                                                  seconds or           



                                                  until           



                                                  completely           



                                                  dissolved           

 

           No                       Notes:           [facility]



     packet      3-28                               (Same as:           l



               21:30:                               Yordan           [place]



               00                                 Unflavored           



                                                  )              



                                                  Administer           



                                                  ing YORDAN           



                                                  orally:           



                                                  Mix into           



                                                  8-10 fl oz           



                                                  of room           



                                                  temperatur           



                                                  e juice,           



                                                  soda, or           



                                                  water.           



                                                  Also mixes           



                                                  well with           



                                                  warm           



                                                  beverages,           



                                                  like           



                                                  coffee or           



                                                  tea. Can           



                                                  be mixed           



                                                  with           



                                                  applesauce           



                                                  .              



                                                  Thoroughly           



                                                  stir for           



                                                  30-60           



                                                  seconds or           



                                                  until           



                                                  completely           



                                                  dissolved           

 

     Yordan      -0      No                       Notes:           [facility]



     packet      3-28                               (Same as:           l



               21:30:                               Yordan           [facility]



               00                                 Unflavored           



                                                  )              



                                                  Administer           



                                                  ing YORDAN           



                                                  orally:           



                                                  Mix into           



                                                  8-10 fl oz           



                                                  of room           



                                                  temperatur           



                                                  e juice,           



                                                  soda, or           



                                                  water.           



                                                  Also mixes           



                                                  well with           



                                                  warm           



                                                  beverages,           



                                                  like           



                                                  coffee or           



                                                  tea. Can           



                                                  be mixed           



                                                  with           



                                                  applesauce           



                                                  .              



                                                  Thoroughly           



                                                  stir for           



                                                  30-60           



                                                  seconds or           



                                                  until           



                                                  completely           



                                                  dissolved           

 

     Yordan      -0      No                       Notes:           [facility]



     packet      3-28                               (Same as:           l



               21:30:                               Yordan           [facility]



               00                                 Unflavored           



                                                  )              



                                                  Administer           



                                                  ing YORDAN           



                                                  orally:           



                                                  Mix into           



                                                  8-10 fl oz           



                                                  of room           



                                                  temperatur           



                                                  e juice,           



                                                  soda, or           



                                                  water.           



                                                  Also mixes           



                                                  well with           



                                                  warm           



                                                  beverages,           



                                                  like           



                                                  coffee or           



                                                  tea. Can           



                                                  be mixed           



                                                  with           



                                                  applesauce           



                                                  .              



                                                  Thoroughly           



                                                  stir for           



                                                  30-60           



                                                  seconds or           



                                                  until           



                                                  completely           



                                                  dissolved           

 

     Yordan      -0      No                       Notes:           [facility]



     packet      3-                               (Same as:           l



               21:30:                               Yordan           [facility]



               00                                 Unflavored           



                                                  )              



                                                  Administer           



                                                  ing YORDAN           



                                                  orally:           



                                                  Mix into           



                                                  8-10 fl oz           



                                                  of room           



                                                  temperatur           



                                                  e juice,           



                                                  soda, or           



                                                  water.           



                                                  Also mixes           



                                                  well with           



                                                  warm           



                                                  beverages,           



                                                  like           



                                                  coffee or           



                                                  tea. Can           



                                                  be mixed           



                                                  with           



                                                  applesauce           



                                                  .              



                                                  Thoroughly           



                                                  stir for           



                                                  30-60           



                                                  seconds or           



                                                  until           



                                                  completely           



                                                  dissolved           

 

     Lovenox      -0      No                       Notes:           [facility]



               3-27                               (Same as:           l



               16:00:                               Lovenox)           [place]



               00                                                

 

     Lovenox      -0      No                       Notes:           [facility]



               3-27                               (Same as:           l



               16:00:                               Lovenox)           [facility]



               00                                                

 

     Lovenox      -0      No                       Notes:           [facility]



               3-27                               (Same as:           l



               16:00:                               Lovenox)           [facility]



               00                                                

 

     Lovenox      -0      No                       Notes:           [facility]



               3-27                               (Same as:           l



               16:00:                               Lovenox)           [place]



               00                                                

 

     Lovenox      -0      No                       Notes:           [facility]



               3-27                               (Same as:           l



               16:00:                               Lovenox)           [place]



               00                                                

 

     Lovenox      -0      No                       Notes:           [facility]



               3-27                               (Same as:           l



               16:00:                               Lovenox)           [place]



               00                                                

 

     Magnesium      -0      No                       Notes:           [facility]

a



     Sulfate      3-27                               WASTE: F/P           l



               15:54:                               - Sink; E           [place]



               00                                 -              



                                                  [facility]           



                                                  Trash Bin           

 

     methylPREDN            No                       Notes:           [facility]

[facility]



     ISolone      3-27                               (Same           l



     SODium      15:54:                               as:Solu-ME           [facility]

nn



     SUCCinate      00                                 DROL,           



                                                  A-Methapre           



                                                  d) ***           



                                                  MEDICATION           



                                                  WASTE ***           



                                                  Product           



                                                  Size: 1000           



                                                  mg Product           



                                                  Wasted:           



                                                  ___ mg           

 

     Magnesium            No                       Notes:           [facility]

a



     Sulfate      3-27                               WASTE: F/P           l



               15:54:                               - Sink;                                  -              



                                                  [facility]           



                                                  Trash Bin           

 

     methylPREDN            No                       Notes:           [facility]

[facility]



     ISolone      3-27                               (Same           l



     SODium      15:54:                               as:Solu-ME           [facility]

nn



     SUCCinate      00                                 DROL,           



                                                  A-Methapre           



                                                  d) ***           



                                                  MEDICATION           



                                                  WASTE ***           



                                                  Product           



                                                  Size: 1000           



                                                  mg Product           



                                                  Wasted:           



                                                  ___ mg           

 

     Magnesium            No                       Notes:           [facility]

a



     Sulfate      3-27                               WASTE: F/P           l



               15:54:                               - Sink;                                  -              



                                                  [facility]           



                                                  Trash Bin           

 

     methylPREDN            No                       Notes:           [facility]

raje[facility]



     ISolone      3-27                               (Same           l



     SODium      15:54:                               as:Solu-ME           [place]

nn



     SUCCinate      00                                 DROL,           



                                                  A-Methapre           



                                                  d) ***           



                                                  MEDICATION           



                                                  WASTE ***           



                                                  Product           



                                                  Size: 1000           



                                                  mg Product           



                                                  Wasted:           



                                                  ___ mg           

 

     Magnesium            No                       Notes:           [facility]

a



     Sulfate      3-27                               WASTE: F/P           l



               15:54:                               - Sink;                                  -              



                                                  [facility]           



                                                  Trash Bin           

 

     methylPREDN            No                       Notes:           [facility]

rajeev



     ISolone      3-27                               (Same           l



     SODium      15:54:                               as:Solu-ME           [place]

nn



     SUCCinate      00                                 DROL,           



                                                  A-Methapre           



                                                  d) ***           



                                                  MEDICATION           



                                                  WASTE ***           



                                                  Product           



                                                  Size: 1000           



                                                  mg Product           



                                                  Wasted:           



                                                  ___ mg           

 

     Magnesium            No                       Notes:           [facility]

a



     Sulfate      3-27                               WASTE: F/P           l



               15:54:                               - Sink;                                  -              



                                                  [facility]           



                                                  Trash Bin           

 

     methylPREDN            No                       Notes:           [facility]

[facility]



     ISolone      3-27                               (Same           l



     SODium      15:54:                               as:Solu-ME           [facility]

nn



     SUCCinate      00                                 DROL,           



                                                  A-Methapre           



                                                  d) ***           



                                                  MEDICATION           



                                                  WASTE ***           



                                                  Product           



                                                  Size: 1000           



                                                  mg Product           



                                                  Wasted:           



                                                  ___ mg           

 

     Magnesium            No                       Notes:           [facility]

a



     Sulfate      3-                               WASTE: F/P           l



               15:54:                               - Sink; E           [facility]



               00                                 -              



                                                  [facility]           



                                                  Trash Bin           

 

     methylPREDN            No                       Notes:           [facility]

[facility]



     ISolone      3-                               (Same           l



     SODium      15:54:                               as:Solu-ME           [place]

nn



     SUCCinate      00                                 DROL,           



                                                  A-Methapre           



                                                  d) ***           



                                                  MEDICATION           



                                                  WASTE ***           



                                                  Product           



                                                  Size: 1000           



                                                  mg Product           



                                                  Wasted:           



                                                  ___ mg           

 

     Dilaudid            No                       Notes:           [facility]



               3-27                               (Same as:           l



               13:28:                               Dilaudid)           [facility]



               00                                                

 

     Dilaudid            No                       Notes:           [facility]



               3-27                               (Same as:           l



               13:28:                               Dilaudid)           [facility]



               00                                                

 

     Dilaudid            No                       Notes:           [facility]



               3-27                               (Same as:           l



               13:28:                               Dilaudid)           [place]



               00                                                

 

     Dilaudid            No                       Notes:           [facility]



               3-27                               (Same as:           l



               13:28:                               Dilaudid)           [facility]



                                                               

 

     Dilaudid            No                       Notes:           [facility]



               3-27                               (Same as:           l



               13:28:                               Dilaudid)           [place]



               00                                                

 

     Dilaudid            No                       Notes:           [facility]



               3-27                               (Same as:           l



               13:28:                               Dilaudid)           [facility]



               00                                                

 

     remove            No                       Notes:           [facility]



     patch      3-27                               Remove           l



               02:00:                               patch 12           [place]



               00                                 hours           



                                                  after           



                                                  applicatio           



                                                  n each           



                                                  day.           

 

     remove            No                       Notes:           [facility]



     patch      3-27                               Remove           l



               02:00:                               patch 12           [place]



               00                                 hours           



                                                  after           



                                                  applicatio           



                                                  n each           



                                                  day.           

 

     remove            No                       Notes:           [facility]



     patch      3-27                               Remove           l



               02:00:                               patch 12           [facility]



               00                                 hours           



                                                  after           



                                                  applicatio           



                                                  n each           



                                                  day.           

 

     remove            No                       Notes:           [facility]



     patch      3-27                               Remove           l



               02:00:                               patch 12           [facility]



               00                                 hours           



                                                  after           



                                                  applicatio           



                                                  n each           



                                                  day.           

 

     remove            No                       Notes:           [facility]



     patch      3-27                               Remove           l



               02:00:                               patch 12           [place]



               00                                 hours           



                                                  after           



                                                  applicatio           



                                                  n each           



                                                  day.           

 

     remove            No                       Notes:           [facility]



     patch      3-27                               Remove           l



               02:00:                               patch 12           [place]



               00                                 hours           



                                                  after           



                                                  applicatio           



                                                  n each           



                                                  day.           

 

     Lyrica            No                       Notes:           [facility]



               3-26                               (Same as:           l



               21:58:                               Lyrica)           [place]



                                                               

 

     Naproxen            No                       Notes:           [facility]



               3-26                               (Same as:           l



               21:58:                               Naprosyn)           [place]



                                                Take with           



                                                  food.           

 

     Lyrica            No                       Notes:           [facility]



               3-26                               (Same as:           l



               21:58:                               Lyrica)           [facility]



                                                               

 

     Naproxen            No                       Notes:           [facility]



               3-26                               (Same as:           l



               21:58:                               Naprosyn)           [place]



                                                Take with           



                                                  food.           

 

     Lyrica            No                       Notes:           [facility]



               3-26                               (Same as:           l



               21:58:                               Lyrica)           [place]                                                

 

     Naproxen            No                       Notes:           [facility]



               3-26                               (Same as:           l



               21:58:                               Naprosyn)           [place]                                 Take with           



                                                  food.           

 

     Lyrica            No                       Notes:           [facility]



               3-26                               (Same as:           l



               21:58:                               Lyrica)           [place]                                                

 

     Naproxen            No                       Notes:           [facility]



               3-26                               (Same as:           l



               21:58:                               Naprosyn)           [facility]                                 Take with           



                                                  food.           

 

     Lyrica            No                       Notes:           [facility]



               3-26                               (Same as:           l



               21:58:                               Lyrica)           [place]                                                

 

     Naproxen            No                       Notes:           [facility]



               3-26                               (Same as:           l



               21:58:                               Naprosyn)           [facility]                                 Take with           



                                                  food.           

 

     Lyrica            No                       Notes:           [facility]



               3-26                               (Same as:           l



               21:58:                               Lyrica)           [place]                                                

 

     Naproxen            No                       Notes:           [facility]



               3-26                               (Same as:           l



               21:58:                               Naprosyn)           [facility]                                 Take with           



                                                  food.           

 

     Ascorbic            No                       Notes:           [facility]



     Acid      3-26                               (Same as:           l



               14:00:                               Vitamin C)                                                           

 

     Zinc            No                       Notes:           [facility]



     Sulfate      3-26                               (Zinc           l



               14:00:                               sulfate                                            capsule) -           



                                                  220 mg           



                                                  Zinc           



                                                  sulfate =           



                                                  50 mg           



                                                  elemental           



                                                  zinc Same           



                                                  as Zinc           



                                                  Sulfate           

 

     multivitami            No                       Notes:           [facility]

[facility]



     n         3-26                               (Same           l



               14:00:                               as:[facility])                                            WASTE: F/P           



                                                  - Black; E           



                                                  -              



                                                  Municipal           



                                                  Trash Bin           



                                                  Take with           



                                                  food.           

 

     Lidocaine            No                       Notes:           [facility]

a



     0.05 MG/MG      3-26                               Apply only           l



     Transdermal      14:00:                               once for           [facility]

[facility]



     Patch      00                                 up to 12           



                                                  hours in a           



                                                  24-hour           



                                                  period (12           



                                                  hours on           



                                                  and 12           



                                                  hours           



                                                  off).           



                                                  (Same as:           



                                                  [facility]           



                                                  Lidocaine           



                                                  Patch)           



                                                  "Remove           



                                                  old patch           



                                                  before           



                                                  applicatio           



                                                  n of new           



                                                  patch"           

 

     Ascorbic            No                       Notes:           [facility]



     Acid      3-26                               (Same as:           l



               14:00:                               Vitamin C)           [place]



                                                               

 

     Zinc            No                       Notes:           [facility]



     Sulfate      3-26                               (Zinc           l



               14:00:                               sulfate           [place]



               00                                 capsule) -           



                                                  220 mg           



                                                  Zinc           



                                                  sulfate =           



                                                  50 mg           



                                                  elemental           



                                                  zinc Same           



                                                  as Zinc           



                                                  Sulfate           

 

     multivitami            No                       Notes:           [facility]

[facility]



     n         3-26                               (Same           l



               14:00:                               as:[facility])                                            WASTE: F/P           



                                                  - Black; E           



                                                  -              



                                                  Municipal           



                                                  Trash Bin           



                                                  Take with           



                                                  food.           

 

     Lidocaine            No                       Notes:           [facility]

a



     0.05 MG/MG      3-26                               Apply only           l



     Transdermal      14:00:                               once for           [facility]

[facility]



     Patch      00                                 up to 12           



                                                  hours in a           



                                                  24-hour           



                                                  period (12           



                                                  hours on           



                                                  and 12           



                                                  hours           



                                                  off).           



                                                  (Same as:           



                                                  [facility]           



                                                  Lidocaine           



                                                  Patch)           



                                                  "Remove           



                                                  old patch           



                                                  before           



                                                  applicatio           



                                                  n of new           



                                                  patch"           

 

     Ascorbic            No                       Notes:           [facility]



     Acid      3-26                               (Same as:           l



               14:00:                               Vitamin C)                                                           

 

     Zinc            No                       Notes:           [facility]



     Sulfate      3-26                               (Zinc           l



               14:00:                               sulfate           [facility]



               00                                 capsule) -           



                                                  220 mg           



                                                  Zinc           



                                                  sulfate =           



                                                  50 mg           



                                                  elemental           



                                                  zinc Same           



                                                  as Zinc           



                                                  Sulfate           

 

     multivitami            No                       Notes:           [facility]

[facility]



     n         3-26                               (Same           l



               14:00:                               as:[facility])           [facility]                                 WASTE: F/P           



                                                  - Black; E           



                                                  -              



                                                  Municipal           



                                                  Trash Bin           



                                                  Take with           



                                                  food.           

 

     Lidocaine            No                       Notes:           [facility]

a



     0.05 MG/MG      3-26                               Apply only           l



     Transdermal      14:00:                               once for           [facility]

[facility]



     Patch      00                                 up to 12           



                                                  hours in a           



                                                  24-hour           



                                                  period (12           



                                                  hours on           



                                                  and 12           



                                                  hours           



                                                  off).           



                                                  (Same as:           



                                                  [facility]           



                                                  Lidocaine           



                                                  Patch)           



                                                  "Remove           



                                                  old patch           



                                                  before           



                                                  applicatio           



                                                  n of new           



                                                  patch"           

 

     Ascorbic            No                       Notes:           [facility]



     Acid      3-26                               (Same as:           l



               14:00:                               Vitamin C)                                                           

 

     Zinc            No                       Notes:           [facility]



     Sulfate      3-26                               (Zinc           l



               14:00:                               sulfate           [facility]



               00                                 capsule) -           



                                                  220 mg           



                                                  Zinc           



                                                  sulfate =           



                                                  50 mg           



                                                  elemental           



                                                  zinc Same           



                                                  as Zinc           



                                                  Sulfate           

 

     multivitami            No                       Notes:           [facility]

[facility]



     n         3-26                               (Same           l



               14:00:                               as:[facility])                                            WASTE: F/P           



                                                  - Black; E           



                                                  -              



                                                  Municipal           



                                                  Trash Bin           



                                                  Take with           



                                                  food.           

 

     Lidocaine            No                       Notes:           [facility]

a



     0.05 MG/MG      3-26                               Apply only           l



     Transdermal      14:00:                               once for           [facility]

[facility]



     Patch      00                                 up to 12           



                                                  hours in a           



                                                  24-hour           



                                                  period (12           



                                                  hours on           



                                                  and 12           



                                                  hours           



                                                  off).           



                                                  (Same as:           



                                                  [facility]           



                                                  Lidocaine           



                                                  Patch)           



                                                  "Remove           



                                                  old patch           



                                                  before           



                                                  applicatio           



                                                  n of new           



                                                  patch"           

 

     Ascorbic            No                       Notes:           [facility]



     Acid      3-26                               (Same as:           l



               14:00:                               Vitamin C)                                                           

 

     Zinc            No                       Notes:           [facility]



     Sulfate      3-26                               (Zinc           l



               14:00:                               sulfate           [facility]



               00                                 capsule) -           



                                                  220 mg           



                                                  Zinc           



                                                  sulfate =           



                                                  50 mg           



                                                  elemental           



                                                  zinc Same           



                                                  as Zinc           



                                                  Sulfate           

 

     multivitami            No                       Notes:           [facility]

[facility]



     n         3-26                               (Same           l



               14:00:                               as:[facility])                                            WASTE: F/P           



                                                  - Black; E           



                                                  -              



                                                  Municipal           



                                                  Trash Bin           



                                                  Take with           



                                                  food.           

 

     Lidocaine            No                       Notes:           [facility]

a



     0.05 MG/MG      3-26                               Apply only           l



     Transdermal      14:00:                               once for           [facility]

[facility]



     Patch      00                                 up to 12           



                                                  hours in a           



                                                  24-hour           



                                                  period (12           



                                                  hours on           



                                                  and 12           



                                                  hours           



                                                  off).           



                                                  (Same as:           



                                                  [facility]           



                                                  Lidocaine           



                                                  Patch)           



                                                  "Remove           



                                                  old patch           



                                                  before           



                                                  applicatio           



                                                  n of new           



                                                  patch"           

 

     Ascorbic            No                       Notes:           [facility]



     Acid      3-26                               (Same as:           l



               14:00:                               Vitamin C)                                                           

 

     Zinc            No                       Notes:           [facility]



     Sulfate      3-26                               (Zinc           l



               14:00:                               sulfate           [facility]



               00                                 capsule) -           



                                                  220 mg           



                                                  Zinc           



                                                  sulfate =           



                                                  50 mg           



                                                  elemental           



                                                  zinc Same           



                                                  as Zinc           



                                                  Sulfate           

 

     multivitami            No                       Notes:           [facility]

[facility]



     n         3-26                               (Same           l



               14:00:                               as:[facility])                                            WASTE: F/P           



                                                  - Black; E           



                                                  -              



                                                  Municipal           



                                                  Trash Bin           



                                                  Take with           



                                                  food.           

 

     Lidocaine            No                       Notes:           [facility]

a



     0.05 MG/MG      3-                               Apply only           l



     Transdermal      14:00:                               once for           [facility]

[facility]



     Patch      00                                 up to 12           



                                                  hours in a           



                                                  24-hour           



                                                  period (12           



                                                  hours on           



                                                  and 12           



                                                  hours           



                                                  off).           



                                                  (Same as:           



                                                  Aspercreme           



                                                  Lidocaine           



                                                  Patch)           



                                                  "Remove           



                                                  old patch           



                                                  before           



                                                  applicatio           



                                                  n of new           



                                                  patch"           

 

     Celebrex            No                       Notes:           [facility]



               3-26                               NSAID.           l



               03:15:                               Please           [facility]



               00                                 check           



                                                  indication           



                                                  . Not for           



                                                  seizure.           



                                                  (Same As:           



                                                  CeleBREX)           

 

     Robaxin            No                       Notes:           [facility]



               3-26                               (Same           l



               03:15:                               as:Robaxin           [place]



               00                                 )              

 

     gabapentin            No                       Notes:           [facility]

ia



               3-26                               (Same as:           l



               03:15:                               Neurontin)           [place]



               00                                                

 

     Tramadol            No                       Notes: Not           [facility]

[facility]



               3-26                               to exceed           l



               03:15:                               400mg/day.           [facility]



               00                                 (Same As:           



                                                  Ultram)           

 

     Celebrex            No                       Notes:           [facility]



               3-26                               NSAID.           l



               03:15:                               Please           [facility]



               00                                 check           



                                                  indication           



                                                  . Not for           



                                                  seizure.           



                                                  (Same As:           



                                                  CeleBREX)           

 

     Robaxin            No                       Notes:           [facility]



               3-26                               (Same           l



               03:15:                               as:Robaxin           [place]



               00                                 )              

 

     gabapentin            No                       Notes:           [facility]

[facility]



               3-26                               (Same as:           l



               03:15:                               Neurontin)           [place]



               00                                                

 

     Tramadol            No                       Notes: Not           [facility]

[facility]



               3-26                               to exceed           l



               03:15:                               400mg/day.           [place]



               00                                 (Same As:           



                                                  Ultram)           

 

     Celebrex            No                       Notes:           [facility]



               3-26                               NSAID.           l



               03:15:                               Please           [facility]



               00                                 check           



                                                  indication           



                                                  . Not for           



                                                  seizure.           



                                                  (Same As:           



                                                  CeleBREX)           

 

     Robaxin            No                       Notes:           [facility]



               3-26                               (Same           l



               03:15:                               as:Robaxin           [facility]



               00                                 )              

 

     gabapentin      0      No                       Notes:           [facility]

[facility]



               3-26                               (Same as:           l



               03:15:                               Neurontin)           [place]



               00                                                

 

     Tramadol      0      No                       Notes: Not           [facility]

[facility]



               3-26                               to exceed           l



               03:15:                               400mg/day.           [place]



               00                                 (Same As:           



                                                  Ultram)           

 

     Celebrex      0      No                       Notes:           [facility]



               3-26                               NSAID.           l



               03:15:                               Please           [facility]



               00                                 check           



                                                  indication           



                                                  . Not for           



                                                  seizure.           



                                                  (Same As:           



                                                  CeleBREX)           

 

     Robaxin            No                       Notes:           [facility]



               3-26                               (Same           l



               03:15:                               as:Robaxin           [place]



               00                                 )              

 

     gabapentin      0      No                       Notes:           [facility]

[facility]



               3-26                               (Same as:           l



               03:15:                               Neurontin)           [place]



               00                                                

 

     Tramadol      0      No                       Notes: Not           [facility]

[facility]



               3-26                               to exceed           l



               03:15:                               400mg/day.           [place]



               00                                 (Same As:           



                                                  Ultram)           

 

     Celebrex      0      No                       Notes:           [facility]



               3-26                               NSAID.           l



               03:15:                               Please           [facility]



               00                                 check           



                                                  indication           



                                                  . Not for           



                                                  seizure.           



                                                  (Same As:           



                                                  CeleBREX)           

 

     Robaxin            No                       Notes:           [facility]



               3-26                               (Same           l



               03:15:                               as:Robaxin           [place]



               00                                 )              

 

     gabapentin      0      No                       Notes:           [facility]

[facility]



               3-26                               (Same as:           l



               03:15:                               Neurontin)           [facility]



               00                                                

 

     Tramadol      0      No                       Notes: Not           [facility]

[facility]



               3-26                               to exceed           l



               03:15:                               400mg/day.           [facility]



               00                                 (Same As:           



                                                  Ultram)           

 

     Celebrex            No                       Notes:           [facility]



               3-26                               NSAID.           l



               03:15:                               Please           [place]



               00                                 check           



                                                  indication           



                                                  . Not for           



                                                  seizure.           



                                                  (Same As:           



                                                  CeleBREX)           

 

     Robaxin            No                       Notes:           [facility]



               3-26                               (Same           l



               03:15:                               as:Robaxin           [place]



               00                                 )              

 

     gabapentin      0      No                       Notes:           [facility]

[facility]



               3-26                               (Same as:           l



               03:15:                               Neurontin)           [facility]



               00                                                

 

     Tramadol      0      No                       Notes: Not           [facility]

[facility]



               3-26                               to exceed           l



               03:15:                               400mg/day.           [place]



               00                                 (Same As:           



                                                  Ultram)           

 

     Dexamethaso      0      No                       Notes: ***           

[facility]



     ne        3-26                               MEDICATION           l



               03:00:                               WASTE ***           [place]                                 Product           



                                                  Size: 10           



                                                  mg Product           



                                                  Wasted:           



                                                  ___ mg           

 

     Dexamethaso      0      No                       Notes: ***           

[facility]



     ne        3-26                               MEDICATION           l



               03:00:                               WASTE ***           [facility]                                 Product           



                                                  Size: 10           



                                                  mg Product           



                                                  Wasted:           



                                                  ___ mg           

 

     Dexamethaso      0      No                       Notes: ***           

[facility]



     ne        3-26                               MEDICATION           l



               03:00:                               WASTE ***           [facility]



                                                Product           



                                                  Size: 10           



                                                  mg Product           



                                                  Wasted:           



                                                  ___ mg           

 

     Dexamethaso      -0      No                       Notes: ***           

[facility]



     ne        3-26                               MEDICATION           l



               03:00:                               WASTE ***           [place]



                                                Product           



                                                  Size: 10           



                                                  mg Product           



                                                  Wasted:           



                                                  ___ mg           

 

     Dexamethaso      -0      No                       Notes: ***           

[facility]



     ne        3-26                               MEDICATION           l



               03:00:                               WASTE ***           [place]



                                                Product           



                                                  Size: 10           



                                                  mg Product           



                                                  Wasted:           



                                                  ___ mg           

 

     Dexamethaso      -0      No                       Notes: ***           

[facility]



     ne        3-26                               MEDICATION           l



               03:00:                               WASTE ***           [facility]



                                                Product           



                                                  Size: 10           



                                                  mg Product           



                                                  Wasted:           



                                                  ___ mg           

 

     Dilaudid            No                       Notes:           [facility]



               3-                               Same as           l



               02:58:                               Dilaudid           [facility]



               00                                                

 

     Dilaudid      -0      No                       Notes:           [facility]



               3-                               Same as           l



               02:58:                               Dilaudid           [place]



               00                                                

 

     Dilaudid      -0      No                       Notes:           [facility]



               3-                               Same as           l



               02:58:                               Dilaudid           [place]



               00                                                

 

     Dilaudid      -0      No                       Notes:           [facility]



               3-                               Same as           l



               02:58:                               Dilaudid           [place]



               00                                                

 

     Dilaudid      -0      No                       Notes:           [facility]



               3-                               Same as           l



               02:58:                               Dilaudid           [facility]



               00                                                

 

     Dilaudid      -0      No                       Notes:           [facility]



               3-                               Same as           l



               02:58:                               Dilaudid           [facility]



               00                                                

 

     Acetaminoph            No                       Notes: Max           

[facility]



     en        3-25                               acetaminop           l



               22:38:                               hen =           [place]



               00                                 4000mg/day           



                                                  (4             



                                                  gm/day).           



                                                  (Same as:           



                                                  Tylenol)           

 

     Acetaminoph            No                       Notes: Max           

[facility]



     en        3-25                               acetaminop           l



               22:38:                               hen =           [place]



               00                                 4000mg/day           



                                                  (4             



                                                  gm/day).           



                                                  (Same as:           



                                                  Tylenol)           

 

     Acetaminoph            No                       Notes: Max           

[facility]



     en        3-25                               acetaminop           l



               22:38:                               hen =           [place]



               00                                 4000mg/day           



                                                  (4             



                                                  gm/day).           



                                                  (Same as:           



                                                  Tylenol)           

 

     Acetaminoph            No                       Notes: Max           

[facility]



     en        3-25                               acetaminop           l



               22:38:                               hen =           [place]



               00                                 4000mg/day           



                                                  (4             



                                                  gm/day).           



                                                  (Same as:           



                                                  Tylenol)           

 

     Acetaminoph            No                       Notes: Max           

[facility]



     en        3-25                               acetaminop           l



               22:38:                               hen =           [facility]



               00                                 4000mg/day           



                                                  (4             



                                                  gm/day).           



                                                  (Same as:           



                                                  Tylenol)           

 

     Acetaminoph            No                       Notes: Max           

[facility]



     en        3-25                               acetaminop           l



               22:38:                               hen =           [place]



               00                                 4000mg/day           



                                                  (4             



                                                  gm/day).           



                                                  (Same as:           



                                                  Tylenol)           

 

     Isolyte S            No                       Notes:           [facility]

a



     PH-7.4      3-25                               (Same as:           l



     (Bolus) IV      22:37:                               Isolyte S           [facility]

[facility]



               00                                 PH7.4,           



                                                  Normosol-R           



                                                  PH 7.4,           



                                                  Plasma-Lyt           



                                                  e A )           

 

     Magnesium            No                       Notes:           [facility]

a



     Sulfate      3-25                               WASTE: F/P           l



               22:37:                               - Sink; E           [facility]



                                                -              



                                                  [facility]unicipal           



                                                  Trash Bin           

 

     Isolyte S            No                       Notes:           [facility]

a



     PH-7.4      3-25                               (Same as:           l



     (Bolus) IV      22:37:                               Isolyte S           [facility]

[facility]



               00                                 PH7.4,           



                                                  Normosol-R           



                                                  PH 7.4,           



                                                  Plasma-Lyt           



                                                  e A )           

 

     Magnesium            No                       Notes:           [facility]

a



     Sulfate      3-25                               WASTE: F/P           l



               22:37:                               - Sink; E           [place]



                                                -              



                                                  Municipal           



                                                  Trash Bin           

 

     Isolyte S            No                       Notes:           [facility]

a



     PH-7.4      3-25                               (Same as:           l



     (Bolus) IV      22:37:                               Isolyte S           [facility]

[facility]



               00                                 PH7.4,           



                                                  Normosol-R           



                                                  PH 7.4,           



                                                  Plasma-Lyt           



                                                  e A )           

 

     Magnesium            No                       Notes:           [facility]

a



     Sulfate      3-25                               WASTE: F/P           l



               22:37:                               - Sink; E           [place]



                                                -              



                                                  Municipal           



                                                  Trash Bin           

 

     Isolyte S            No                       Notes:           [facility]

a



     PH-7.4      3-25                               (Same as:           l



     (Bolus) IV      22:37:                               Isolyte S           [facility]

[facility]



               00                                 PH7.4,           



                                                  Normosol-R           



                                                  PH 7.4,           



                                                  Plasma-Lyt           



                                                  e A )           

 

     Magnesium            No                       Notes:           [facility]

a



     Sulfate      3-25                               WASTE: F/P           l



               22:37:                               - Sink; E           [place]



                                                -              



                                                  [facility]           



                                                  Trash [facility]           

 

     Isolyte S            No                       Notes:           [facility]

a



     PH-7.4      3-25                               (Same as:           l



     (Bolus) IV      22:37:                               Isolyte S           He

[facility]ann



               00                                 PH7.4,           



                                                  Normosol-R           



                                                  PH 7.4,           



                                                  Plasma-Lyt           



                                                  e A )           

 

     Magnesium            No                       Notes:           [facility]

a



     Sulfate      3-25                               WASTE: F/P           l



               22:37:                               - Sink; E           [facility]



               00                                 -              



                                                  [facility]           



                                                  Trash Bin           

 

     Isolyte S            No                       Notes:           [facility]

a



     PH-7.4      3-25                               (Same as:           l



     (Bolus) IV      22:37:                               Isolyte S           He

[facility]



               00                                 PH7.4,           



                                                  Normosol-R           



                                                  PH 7.4,           



                                                  Plasma-Lyt           



                                                  e A )           

 

     Magnesium            No                       Notes:           [facility]

a



     Sulfate      3-25                               WASTE: F/P           l



               22:37:                               - Sink; E           [facility]



                                                -              



                                                  [facility]           



                                                  Trash Bin           

 

     Iohexol            No                       100 mL,           [facility]



               3-25                               Route:           l



               20:04:                               IVP, Drug           [facility]



               00                                 Form:           



                                                  SOLN,           



                                                  Dosing           



                                                  Weight           



                                                  127.727,           



                                                  kg,            



                                                  ONCALL,           



                                                  STAT,           



                                                  Start           



                                                  date:           



                                                  22           



                                                  15:04:00           



                                                  CDT,           



                                                  Duration:           



                                                  1 doses or           



                                                  times,           



                                                  Dose =           



                                                  2.2ml/kg,           



                                                  Max dose =           



                                                  100ml --           



                                                  "To be           



                                                  infused by           



                                                  Radiology           



                                                  Staff           



                                                  ONLY"           

 

     Iohexol      0      No                       100 mL,           [facility]



               3-25                               Route:           l



               20:04:                               IVP, Drug           [facility]



               00                                 Form:           



                                                  SOLN,           



                                                  Dosing           



                                                  Weight           



                                                  127.727,           



                                                  kg,            



                                                  ONCALL,           



                                                  STAT,           



                                                  Start           



                                                  date:           



                                                  22           



                                                  15:04:00           



                                                  CDT,           



                                                  Duration:           



                                                  1 doses or           



                                                  times,           



                                                  Dose =           



                                                  2.2ml/kg,           



                                                  Max dose =           



                                                  100ml --           



                                                  "To be           



                                                  infused by           



                                                  Radiology           



                                                  Staff           



                                                  ONLY"           

 

     Iohexol      0      No                       100 mL,           [facility]



               3-25                               Route:           l



               20:04:                               IVP, Drug           [place]



               00                                 Form:           



                                                  SOLN,           



                                                  Dosing           



                                                  Weight           



                                                  127.727,           



                                                  kg,            



                                                  ONCALL,           



                                                  STAT,           



                                                  Start           



                                                  date:           



                                                  22           



                                                  15:04:00           



                                                  CDT,           



                                                  Duration:           



                                                  1 doses or           



                                                  times,           



                                                  Dose =           



                                                  2.2ml/kg,           



                                                  Max dose =           



                                                  100ml --           



                                                  "To be           



                                                  infused by           



                                                  Radiology           



                                                  Staff           



                                                  ONLY"           

 

     Iohexol            No                       100 mL,           [facility]



               3-25                               Route:           l



               20:04:                               IVP, Drug           [facility]



               00                                 Form:           



                                                  SOLN,           



                                                  Dosing           



                                                  Weight           



                                                  127.727,           



                                                  kg,            



                                                  ONCALL,           



                                                  STAT,           



                                                  Start           



                                                  date:           



                                                  22           



                                                  15:04:00           



                                                  CDT,           



                                                  Duration:           



                                                  1 doses or           



                                                  times,           



                                                  Dose =           



                                                  2.2ml/kg,           



                                                  Max dose =           



                                                  100ml --           



                                                  "To be           



                                                  infused by           



                                                  Radiology           



                                                  Staff           



                                                  ONLY"           

 

     Iohexol            No                       100 mL,           [facility]



               3-25                               Route:           l



               20:04:                               IVP, Drug           [facility]



               00                                 Form:           



                                                  SOLN,           



                                                  Dosing           



                                                  Weight           



                                                  127.727,           



                                                  kg,            



                                                  ONCALL,           



                                                  STAT,           



                                                  Start           



                                                  date:           



                                                  22           



                                                  15:04:00           



                                                  CDT,           



                                                  Duration:           



                                                  1 doses or           



                                                  times,           



                                                  Dose =           



                                                  2.2ml/kg,           



                                                  Max dose =           



                                                  100ml --           



                                                  "To be           



                                                  infused by           



                                                  Radiology           



                                                  Staff           



                                                  ONLY"           

 

     Iohexol      0      No                       100 mL,           [facility]



               3-25                               Route:           l



               20:04:                               IVP, Drug           [facility]



               00                                 Form:           



                                                  SOLN,           



                                                  Dosing           



                                                  Weight           



                                                  127.727,           



                                                  kg,            



                                                  ONCALL,           



                                                  STAT,           



                                                  Start           



                                                  date:           



                                                  22           



                                                  15:04:00           



                                                  CDT,           



                                                  Duration:           



                                                  1 doses or           



                                                  times,           



                                                  Dose =           



                                                  2.2ml/kg,           



                                                  Max dose =           



                                                  100ml --           



                                                  "To be           



                                                  infused by           



                                                  Radiology           



                                                  Staff           



                                                  ONLY"           

 

     Docusate            No                       Notes:           [facility]



               3-25                               (Same as:           l



               14:00:                               Colace)           [place]



                                                (Do Not           



                                                  Crush)           

 

     sennosides,            No                       Notes:           [facility]

rajeev



     USP       3-25                               (Same as:           l



               14:00:                               Senokot)           [facility]



                                                               

 

     Saline            No                       Notes:           [facility]



     Flush 0.9%      3-25                               preservati           l



               14:00:                               ve free.           [place]



                                                               

 

     Docusate            No                       Notes:           [facility]



               3-25                               (Same as:           l



               14:00:                               Colace)           [facility]



                                                (Do Not           



                                                  Crush)           

 

     sennosides,            No                       Notes:           [facility]

rajeev



     USP       3-25                               (Same as:           l



               14:00:                               Senokot)           [facility]



               00                                                

 

     Saline            No                       Notes:           [facility]



     Flush 0.9%      3-25                               preservati           l



               14:00:                               ve free.           [facility]                                                

 

     Docusate            No                       Notes:           [facility]



               3-25                               (Same as:           l



               14:00:                               Colace)           [place]



                                                (Do Not           



                                                  Crush)           

 

     sennosides,            No                       Notes:           [facility]

rajeev



     USP       3-25                               (Same as:           l



               14:00:                               Senokot)           [facility]                                                

 

     Saline            No                       Notes:           [facility]



     Flush 0.9%      3-25                               preservati           l



               14:00:                               ve free.           [facility]                                                

 

     Docusate            No                       Notes:           [facility]



               3-25                               (Same as:           l



               14:00:                               Colace)           [place]



                                                (Do Not           



                                                  Crush)           

 

     sennosides,            No                       Notes:           [facility]

rajeev



     USP       3-25                               (Same as:           l



               14:00:                               Senokot)                                                           

 

     Saline            No                       Notes:           [facility]



     Flush 0.9%      3-25                               preservati           l



               14:00:                               ve free.           [facility]                                                

 

     Docusate            No                       Notes:           [facility]



               3-25                               (Same as:           l



               14:00:                               Colace)           [place]



                                                (Do Not           



                                                  Crush)           

 

     sennosides,            No                       Notes:           [facility]

rajeev



     USP       3-25                               (Same as:           l



               14:00:                               Senokot)                                                           

 

     Saline            No                       Notes:           [facility]



     Flush 0.9%      3-25                               preservati           l



               14:00:                               ve free.           [facility]                                                

 

     Docusate            No                       Notes:           [facility]



               3-25                               (Same as:           l



               14:00:                               Colace)           [place]



                                                (Do Not           



                                                  Crush)           

 

     sennosides,            No                       Notes:           [facility]

rajeev



     USP       3-25                               (Same as:           l



               14:00:                               Senokot)           [facility]                                                

 

     Saline            No                       Notes:           [facility]



     Flush 0.9%      3-25                               preservati           l



               14:00:                               ve free.                                                           

 

     influenza            Yes                      Notes:           [facility]

a



     virus      3-25                               (Same as:           l



     vaccine,      06:10:                               Fluzone           [facility]

n



     inactivated      26                                 [place]           



                                                  nt,            



                                                  Fluarix           



                                                  [place]           



                                                  nt) For           



                                                  patients 6           



                                                  - 35           



                                                  months of           



                                                  age (0.5           



                                                  mL IM) For           



                                                  3 years of           



                                                  age and           



                                                  older (0.5           



                                                  mL IM)           



                                                  Shake well           



                                                  before use           

 

     influenza            Yes                      Notes:           [facility]

a



     virus      3-25                               (Same as:           l



     vaccine,      06:10:                               Fluzone           [facility]

n



     inactivated      26                                 [place]           



                                                  nt,            



                                                  Fluarix           



                                                  [place]           



                                                  nt) For           



                                                  patients 6           



                                                  - 35           



                                                  months of           



                                                  age (0.5           



                                                  mL IM) For           



                                                  3 years of           



                                                  age and           



                                                  older (0.5           



                                                  mL IM)           



                                                  Shake well           



                                                  before use           

 

     influenza            Yes                      Notes:           [facility]

a



     virus      3-25                               (Same as:           l



     vaccine,      06:10:                               Fluzone           [facility]

n



     inactivated      26                                 [place]           



                                                  nt,            



                                                  Fluarix           



                                                  [place]           



                                                  nt) For           



                                                  patients 6           



                                                  - 35           



                                                  months of           



                                                  age (0.5           



                                                  mL IM) For           



                                                  3 years of           



                                                  age and           



                                                  older (0.5           



                                                  mL IM)           



                                                  Shake well           



                                                  before use           

 

     influenza            Yes                      Notes:           [facility]

a



     virus      3-25                               (Same as:           l



     vaccine,      06:10:                               Fluzone           [facility]

n



     inactivated      26                                 [place]           



                                                  nt,            



                                                  Fluarix           



                                                  [place]           



                                                  nt) For           



                                                  patients 6           



                                                  - 35           



                                                  months of           



                                                  age (0.5           



                                                  mL IM) For           



                                                  3 years of           



                                                  age and           



                                                  older (0.5           



                                                  mL IM)           



                                                  Shake well           



                                                  before use           

 

     influenza            Yes                      Notes:           [facility]

a



     virus      3-25                               (Same as:           l



     vaccine,      06:10:                               Fluzone           [facility]

n



     inactivated      26                                 [place]           



                                                  nt,            



                                                  Fluarix           



                                                  [place]           



                                                  nt) For           



                                                  patients 6           



                                                  - 35           



                                                  months of           



                                                  age (0.5           



                                                  mL IM) For           



                                                  3 years of           



                                                  age and           



                                                  older (0.5           



                                                  mL IM)           



                                                  Shake well           



                                                  before use           

 

     influenza            Yes                      Notes:           [facility]

a



     virus      3-25                               (Same as:           l



     vaccine,      06:10:                               Fluzone           [facility]

n



     inactivated      26                                 [place]           



                                                  nt,            



                                                  Fluarix           



                                                  [place]           



                                                  nt) For           



                                                  patients 6           



                                                  - 35           



                                                  months of           



                                                  age (0.5           



                                                  mL IM) For           



                                                  3 years of           



                                                  age and           



                                                  older (0.5           



                                                  mL IM)           



                                                  Shake well           



                                                  before use           

 

     Lorazepam            No                       Notes:           [facility]

a



               3-25                               (Same as:           l



               06:05:                               Ativan)           [facility]



               00                                                

 

     Methocarbam            No                       Notes:           [facility]

[facility]



     ol        3-25                               (Same           l



               06:05:                               as:Robaxin           [facility]



               00                                 )              

 

     Lorazepam            No                       Notes:           [facility]

a



               3-25                               (Same as:           l



               06:05:                               Ativan)           [facility]                                                

 

     Methocarbam            No                       Notes:           [facility]

[facility]



     ol        3-25                               (Same           l



               06:05:                               as:Robaxin                                            )              

 

     Lorazepam            No                       Notes:           [facility]

a



               3-25                               (Same as:           l



               06:05:                               Ativan)           [facility]                                                

 

     Methocarbam            No                       Notes:           [facility]

[facility]



     ol        3-25                               (Same           l



               06:05:                               as:Robaxin                                            )              

 

     Lorazepam            No                       Notes:           [facility]

a



               3-25                               (Same as:           l



               06:05:                               Ativan)                                                           

 

     Methocarbam            No                       Notes:           [facility]

[facility]



     ol        3-25                               (Same           l



               06:05:                               as:Robaxin                                            )              

 

     Lorazepam            No                       Notes:           [facility]

a



               3-25                               (Same as:           l



               06:05:                               Ativan)                                                           

 

     Methocarbam            No                       Notes:           [facility]

[facility]



     ol        3-25                               (Same           l



               06:05:                               as:Robaxin                                            )              

 

     Lorazepam            No                       Notes:           [facility]

a



               3-25                               (Same as:           l



               06:05:                               Ativan)                                                           

 

     Methocarbam            No                       Notes:           [facility]

[facility]



     ol        3-25                               (Same           l



               06:05:                               as:Robaxin                                            )              

 

     Sodium            No                       1,000 mL,           [facility]

a



     Chloride      3-25                               Rate: 50           l



     0.9% IV      06:03:                               ml/hr,           [facility]



     1,000 mL      00                                 Infuse           



                                                  over: 20           



                                                  hr, Route:           



                                                  IV, Dosing           



                                                  Weight           



                                                  131.818           



                                                  kg, Total           



                                                  Volume:           



                                                  1,000,           



                                                  Start           



                                                  date:           



                                                  22           



                                                  1:03:00           



                                                  CDT,           



                                                  Duration:           



                                                  30 day,           



                                                  Stop date:           



                                                  22           



                                                  1:02:00           



                                                  CDT, BSA:           



                                                  2.4 m2, 0           

 

     Labetalol            No                       10 mg, 2           [facility]

[facility]



               3-25                               mL, Route:           l



               06:03:                               IVP, Drug                                            form: INJ,           



                                                  Q15Min,           



                                                  Dosing           



                                                  Weight           



                                                  131.818,           



                                                  kg, Start           



                                                  date:           



                                                  22           



                                                  1:03:00           



                                                  CDT,           



                                                  Duration:           



                                                  3 doses or           



                                                  times,           



                                                  Stop date:           



                                                  22           



                                                  1:33:00           



                                                  CDT, 0           

 

     Hydralazine            No                       Notes:           [facility]

[facility]



               3-25                               (Same as:           l



               06:03:                               Apresoline                                            ) Push           



                                                  over 5           



                                                  minutes           

 

     Ondansetron            No                       /= 4           [facility]

a



               3-25                               years,           l



               06:03:                               Pediatric           [facility]



               00                                 Dosing           

 

     Acetaminoph            No                       Notes: Do           [facility]

[facility]



     en 325 MG /      3-25                               not exceed           l



     Hydrocodone      06:03:                               4gm/day of           

[facility]



     Bitartrate      00                                 acetaminop           



     10 MG Oral                                         hen. (Same           



     Tablet                                         as: Norco           



     [Norco                                         325/10)           



     10/325]                                                        

 

     Dilaudid            No                       Notes:           [facility]



               3-25                               Same as           l



               06:03:                               Dilaudid                                                           

 

     Benadryl            No                       Notes:           [facility]



               3-25                               (Same as:           l



               06:03:                               Benadryl)           [facility]



                                                               

 

     phenol            No                       Notes:           [facility]



               3-25                               Chlorasept           l



               06:03:                               ic Spray                                            (Same as:           



                                                  Chlorasept           



                                                  ic, Sore           



                                                  Throat           



                                                  Spray)           



                                                  WASTE: F/P           



                                                  - Black; E           



                                                  -              



                                                  Municipal           



                                                  Trash Bin           

 

     Bisacodyl            No                       Notes:           [facility]

a



               3-25                               (Same As:           l



               06:03:                               Dulcolax,                                            Bisco-Lax)           

 

     Robaxin            No                       Notes:           [facility]



               3-25                               (Same           l



               06:03:                               as:Robaxin                                            )              

 

     Melatonin 3            No                       Notes:           [facility]

[facility]



     MG Extended      3-25                               (Same as:           l



     Release      06:03:                               Melatonin)           [facility]

[facility]



     Tablet      00                                                

 

     Tylenol            No                       Notes: Do           [facility]

ia



               3-25                               not exceed           l



               06:03:                               4 gm/day.           [facility]



                                                (Same as:           



                                                  Tylenol)           

 

     Reglan            No                       Notes:           [facility]



               3-25                               (Same as:           l



               06:03:                               Reglan)                                                           

 

     Phenergan            No                       Notes: Do           [facility]

[facility]



               3-25                               not give           l



               06:03:                               IV push.                                            (Same as:           



                                                  Phenergan)           

 

     Saline            No                       Notes:           [facility]



     Flush 0.9%      3-25                               preservati           l



               06:03:                               ve free.                                                           

 

     Sodium      2022-0      No                       1,000 mL,           [facility]

a



     Chloride      3-25                               Rate: 50           l



     0.9% IV      06:03:                               ml/hr,           [facility]



     1,000 mL      00                                 Infuse           



                                                  over: 20           



                                                  hr, Route:           



                                                  IV, Dosing           



                                                  Weight           



                                                  131.818           



                                                  kg, Total           



                                                  Volume:           



                                                  1,000,           



                                                  Start           



                                                  date:           



                                                  22           



                                                  1:03:00           



                                                  CDT,           



                                                  Duration:           



                                                  30 day,           



                                                  Stop date:           



                                                  22           



                                                  1:02:00           



                                                  CDT, BSA:           



                                                  2.4 m2, 0           

 

     Labetalol            No                       10 mg, 2           [facility]

[facility]



               3-25                               mL, Route:           l



               06:03:                               IVP, Drug                                            form: INJ,           



                                                  Q15Min,           



                                                  Dosing           



                                                  Weight           



                                                  131.818,           



                                                  kg, Start           



                                                  date:           



                                                  22           



                                                  1:03:00           



                                                  CDT,           



                                                  Duration:           



                                                  3 doses or           



                                                  times,           



                                                  Stop date:           



                                                  22           



                                                  1:33:00           



                                                  CDT, 0           

 

     Hydralazine            No                       Notes:           [facility]

[facility]



               3-25                               (Same as:           l



               06:03:                               Apresoline                                            ) Push           



                                                  over 5           



                                                  minutes           

 

     Ondansetron            No                       /= 4           [facility]

a



               3-25                               years,           l



               06:03:                               Pediatric                                            Dosing           

 

     Acetaminoph            No                       Notes: Do           [facility]

[facility]



     en 325 MG /      3-25                               not exceed           l



     Hydrocodone      06:03:                               4gm/day of           

[facility]



     Bitartrate      00                                 acetaminop           



     10 MG Oral                                         hen. (Same           



     Tablet                                         as: Norco           



     [Norco                                         325/10)           



     10/325]                                                        

 

     Dilaudid            No                       Notes:           [facility]



               3-25                               Same as           l



               06:03:                               Dilaudid                                                           

 

     Benadryl            No                       Notes:           [facility]



               3-25                               (Same as:           l



               06:03:                               Benadryl)           [facility]



               00                                                

 

     phenol            No                       Notes:           [facility]



               3-25                               Chlorasept           l



               06:03:                               ic Spray                                            (Same as:           



                                                  Chlorasept           



                                                  ic, Sore           



                                                  Throat           



                                                  Spray)           



                                                  WASTE: F/P           



                                                  - Black; E           



                                                  -              



                                                  Municipal           



                                                  Trash Bin           

 

     Bisacodyl            No                       Notes:           [facility]

a



               3-25                               (Same As:           l



               06:03:                               Dulcolax,           [facility]



               00                                 Bisco-Lax)           

 

     Robaxin            No                       Notes:           [facility]



               3-25                               (Same           l



               06:03:                               as:Robaxin                                            )              

 

     Melatonin 3            No                       Notes:           [facility]

[facility]



     MG Extended      3-25                               (Same as:           l



     Release      06:03:                               Melatonin)           [facility]

[facility]



     Tablet      00                                                

 

     Tylenol            No                       Notes: Do           [facility]

[facility]



               3-25                               not exceed           l



               06:03:                               4 gm/day.           [facility]



               00                                 (Same as:           



                                                  Tylenol)           

 

     Reglan            No                       Notes:           [facility]



               3-25                               (Same as:           l



               06:03:                               Reglan)           [facility]



                                                               

 

     Phenergan            No                       Notes: Do           [facility]

[facility]



               3-25                               not give           l



               06:03:                               IV push.           [facility]



                                                (Same as:           



                                                  Phenergan)           

 

     Saline            No                       Notes:           [facility]



     Flush 0.9%      3-25                               preservati           l



               06:03:                               ve free.           [facility]



                                                               

 

     Sodium            No                       1,000 mL,           [facility]

a



     Chloride      3-25                               Rate: 50           l



     0.9% IV      06:03:                               ml/hr,           [facility]



     1,000 mL      00                                 Infuse           



                                                  over: 20           



                                                  hr, Route:           



                                                  IV, Dosing           



                                                  Weight           



                                                  131.818           



                                                  kg, Total           



                                                  Volume:           



                                                  1,000,           



                                                  Start           



                                                  date:           



                                                  22           



                                                  1:03:00           



                                                  CDT,           



                                                  Duration:           



                                                  30 day,           



                                                  Stop date:           



                                                  22           



                                                  1:02:00           



                                                  CDT, BSA:           



                                                  2.4 m2, 0           

 

     Labetalol            No                       10 mg, 2           [facility]

[facility]



               3-25                               mL, Route:           l



               06:03:                               IVP, Drug                                            form: INJ,           



                                                  Q15Min,           



                                                  Dosing           



                                                  Weight           



                                                  131.818,           



                                                  kg, Start           



                                                  date:           



                                                  22           



                                                  1:03:00           



                                                  CDT,           



                                                  Duration:           



                                                  3 doses or           



                                                  times,           



                                                  Stop date:           



                                                  22           



                                                  1:33:00           



                                                  CDT, 0           

 

     Hydralazine            No                       Notes:           [facility]

[facility]



               3-25                               (Same as:           l



               06:03:                               Apresoline                                            ) Push           



                                                  over 5           



                                                  minutes           

 

     Ondansetron            No                       /= 4           [facility]

a



               3-25                               years,           l



               06:03:                               Pediatric           [facility]



               00                                 Dosing           

 

     Acetaminoph            No                       Notes: Do           [facility]

[facility]



     en 325 MG /      3-25                               not exceed           l



     Hydrocodone      06:03:                               4gm/day of           

[facility]



     Bitartrate      00                                 acetaminop           



     10 MG Oral                                         hen. (Same           



     Tablet                                         as: Norco           



     [Norco                                         325/10)           



     10/325]                                                        

 

     Dilaudid            No                       Notes:           [facility]



               3-25                               Same as           l



               06:03:                               Dilaudid           [place]



               00                                                

 

     Benadryl            No                       Notes:           [facility]



               3-25                               (Same as:           l



               06:03:                               Benadryl)           [facility]



               00                                                

 

     phenol            No                       Notes:           [facility]



               3-25                               Chlorasept           l



               06:03:                               ic Spray           [place]                                 (Same as:           



                                                  Chlorasept           



                                                  ic, Sore           



                                                  Throat           



                                                  Spray)           



                                                  WASTE: F/P           



                                                  - Black; E           



                                                  -              



                                                  Municipal           



                                                  Trash Bin           

 

     Bisacodyl            No                       Notes:           [facility]

a



               3-25                               (Same As:           l



               06:03:                               Dulcolax,           [place]



                                                Bisco-Lax)           

 

     Robaxin            No                       Notes:           [facility]



               3-25                               (Same           l



               06:03:                               as:Robaxin                                            )              

 

     Melatonin 3            No                       Notes:           [facility]

[facility]



     MG Extended      3-25                               (Same as:           l



     Release      06:03:                               Melatonin)           [facility]

[facility]



     Tablet      00                                                

 

     Tylenol            No                       Notes: Do           [facility]

[facility]



               3-25                               not exceed           l



               06:03:                               4 gm/day.           [facility]



               00                                 (Same as:           



                                                  Tylenol)           

 

     Reglan            No                       Notes:           [facility]



               3-25                               (Same as:           l



               06:03:                               Reglan)           [place]



               00                                                

 

     Phenergan            No                       Notes: Do           [facility]

[facility]



               3-25                               not give           l



               06:03:                               IV push.           [place]



               00                                 (Same as:           



                                                  Phenergan)           

 

     Saline            No                       Notes:           [facility]



     Flush 0.9%      3-25                               preservati           l



               06:03:                               ve free.           [facility]



               00                                                

 

     Sodium            No                       1,000 mL,           [facility]

a



     Chloride      3-25                               Rate: 50           l



     0.9% IV      06:03:                               ml/hr,           [place]



     1,000 mL      00                                 Infuse           



                                                  over: 20           



                                                  hr, Route:           



                                                  IV, Dosing           



                                                  Weight           



                                                  131.818           



                                                  kg, Total           



                                                  Volume:           



                                                  1,000,           



                                                  Start           



                                                  date:           



                                                  22           



                                                  1:03:00           



                                                  CDT,           



                                                  Duration:           



                                                  30 day,           



                                                  Stop date:           



                                                  22           



                                                  1:02:00           



                                                  CDT, BSA:           



                                                  2.4 m2, 0           

 

     Labetalol            No                       10 mg, 2           [facility]

[facility]



               3-25                               mL, Route:           l



               06:03:                               IVP, Drug                                            form: INJ,           



                                                  Q15Min,           



                                                  Dosing           



                                                  Weight           



                                                  131.818,           



                                                  kg, Start           



                                                  date:           



                                                  22           



                                                  1:03:00           



                                                  CDT,           



                                                  Duration:           



                                                  3 doses or           



                                                  times,           



                                                  Stop date:           



                                                  22           



                                                  1:33:00           



                                                  CDT, 0           

 

     Hydralazine            No                       Notes:           [facility]

[facility]



               3-25                               (Same as:           l



               06:03:                               Apresoline                                            ) Push           



                                                  over 5           



                                                  minutes           

 

     Ondansetron            No                       /= 4           [facility]

a



               3-25                               years,           l



               06:03:                               Pediatric                                            Dosing           

 

     Acetaminoph            No                       Notes: Do           [facility]

[facility]



     en 325 MG /      3-25                               not exceed           l



     Hydrocodone      06:03:                               4gm/day of           

[facility]



     Bitartrate      00                                 acetaminop           



     10 MG Oral                                         hen. (Same           



     Tablet                                         as: Norco           



     [Norco                                         325/10)           



     10/325]                                                        

 

     Dilaudid            No                       Notes:           [facility]



               3-25                               Same as           l



               06:03:                               Dilaudid                                                           

 

     Benadryl            No                       Notes:           [facility]



               3-25                               (Same as:           l



               06:03:                               Benadryl)           [place]



                                                               

 

     phenol            No                       Notes:           [facility]



               3-25                               Chlorasept           l



               06:03:                               ic Spray                                            (Same as:           



                                                  Chlorasept           



                                                  ic, Sore           



                                                  Throat           



                                                  Spray)           



                                                  WASTE: F/P           



                                                  - Black; E           



                                                  -              



                                                  Municipal           



                                                  Trash Bin           

 

     Bisacodyl            No                       Notes:           [facility]

a



               3-25                               (Same As:           l



               06:03:                               Dulcolax,           [facility]                                 Bisco-Lax)           

 

     Robaxin            No                       Notes:           [facility]



               3-25                               (Same           l



               06:03:                               as:Robaxin                                            )              

 

     Melatonin 3            No                       Notes:           [facility]

[facility]



     MG Extended      3-25                               (Same as:           l



     Release      06:03:                               Melatonin)           [facility]

[facility]



     Tablet      00                                                

 

     Tylenol            No                       Notes: Do           [facility]

[facility]



               3-25                               not exceed           l



               06:03:                               4 gm/day.           [facility]



               00                                 (Same as:           



                                                  Tylenol)           

 

     Reglan            No                       Notes:           [facility]



               3-25                               (Same as:           l



               06:03:                               Reglan)                                                           

 

     Phenergan            No                       Notes: Do           [facility]

[facility]



               3-25                               not give           l



               06:03:                               IV push.                                            (Same as:           



                                                  Phenergan)           

 

     Saline            No                       Notes:           [facility]



     Flush 0.9%      3-25                               preservati           l



               06:03:                               ve free.                                                           

 

     Sodium            No                       1,000 mL,           [facility]

a



     Chloride      3-25                               Rate: 50           l



     0.9% IV      06:03:                               ml/hr,           [facility]



     1,000 mL      00                                 Infuse           



                                                  over: 20           



                                                  hr, Route:           



                                                  IV, Dosing           



                                                  Weight           



                                                  131.818           



                                                  kg, Total           



                                                  Volume:           



                                                  1,000,           



                                                  Start           



                                                  date:           



                                                  22           



                                                  1:03:00           



                                                  CDT,           



                                                  Duration:           



                                                  30 day,           



                                                  Stop date:           



                                                  22           



                                                  1:02:00           



                                                  CDT, BSA:           



                                                  2.4 m2, 0           

 

     Labetalol            No                       10 mg, 2           [facility]

[facility]



               3-25                               mL, Route:           l



               06:03:                               IVP, Drug                                            form: INJ,           



                                                  Q15Min,           



                                                  Dosing           



                                                  Weight           



                                                  131.818,           



                                                  kg, Start           



                                                  date:           



                                                  22           



                                                  1:03:00           



                                                  CDT,           



                                                  Duration:           



                                                  3 doses or           



                                                  times,           



                                                  Stop date:           



                                                  22           



                                                  1:33:00           



                                                  CDT, 0           

 

     Hydralazine            No                       Notes:           [facility]

[facility]



               3-25                               (Same as:           l



               06:03:                               Apresoline                                            ) Push           



                                                  over 5           



                                                  minutes           

 

     Ondansetron            No                       /= 4           [facility]

a



               3-25                               years,           l



               06:03:                               Pediatric                                            Dosing           

 

     Acetaminoph            No                       Notes: Do           [facility]

[facility]



     en 325 MG /      3-25                               not exceed           l



     Hydrocodone      06:03:                               4gm/day of           

[facility]



     Bitartrate      00                                 acetaminop           



     10 MG Oral                                         hen. (Same           



     Tablet                                         as: Norco           



     [Norco                                         325/10)           



     10/325]                                                        

 

     Dilaudid            No                       Notes:           [facility]



               3-25                               Same as           l



               06:03:                               Dilaudid                                                           

 

     Benadryl            No                       Notes:           [facility]



               3-25                               (Same as:           l



               06:03:                               Benadryl)                                                           

 

     phenol            No                       Notes:           [facility]



               3-25                               Chlorasept           l



               06:03:                               ic Spray                                            (Same as:           



                                                  Chlorasept           



                                                  ic, Sore           



                                                  Throat           



                                                  Spray)           



                                                  WASTE: F/P           



                                                  - Black; E           



                                                  -              



                                                  Municipal           



                                                  Trash Bin           

 

     Bisacodyl            No                       Notes:           [facility]

[facility]



               3-25                               (Same As:           l



               06:03:                               Dulcolax,                                            Bisco-Lax)           

 

     Robaxin            No                       Notes:           [facility]



               3-25                               (Same           l



               06:03:                               as:Robaxin                                            )              

 

     Melatonin 3            No                       Notes:           [facility]

[facility]



     MG Extended      3-25                               (Same as:           l



     Release      06:03:                               Melatonin)           [facility]

[facility]



     Tablet      00                                                

 

     Tylenol            No                       Notes: Do           [facility]

[facility]



               3-25                               not exceed           l



               06:03:                               4 gm/day.           [facility]



                                                (Same as:           



                                                  Tylenol)           

 

     Reglan            No                       Notes:           [facility]



               3-25                               (Same as:           l



               06:03:                               Reglan)                                                           

 

     Phenergan            No                       Notes: Do           [facility]

[facility]



               3-25                               not give           l



               06:03:                               IV push.                                            (Same as:           



                                                  Phenergan)           

 

     Saline            No                       Notes:           [facility]



     Flush 0.9%      3-25                               preservati           l



               06:03:                               ve free.                                                           

 

     Sodium            No                       1,000 mL,           [facility]

a



     Chloride      3-25                               Rate: 50           l



     0.9% IV      06:03:                               ml/hr,           [facility]



     1,000 mL      00                                 Infuse           



                                                  over: 20           



                                                  hr, Route:           



                                                  IV, Dosing           



                                                  Weight           



                                                  131.818           



                                                  kg, Total           



                                                  Volume:           



                                                  1,000,           



                                                  Start           



                                                  date:           



                                                  22           



                                                  1:03:00           



                                                  CDT,           



                                                  Duration:           



                                                  30 day,           



                                                  Stop date:           



                                                  22           



                                                  1:02:00           



                                                  CDT, BSA:           



                                                  2.4 m2, 0           

 

     Labetalol            No                       10 mg, 2           [facility]

[facility]



               3-25                               mL, Route:           l



               06:03:                               IVP, Drug                                            form: INJ,           



                                                  Q15Min,           



                                                  Dosing           



                                                  Weight           



                                                  131.818,           



                                                  kg, Start           



                                                  date:           



                                                  22           



                                                  1:03:00           



                                                  CDT,           



                                                  Duration:           



                                                  3 doses or           



                                                  times,           



                                                  Stop date:           



                                                  22           



                                                  1:33:00           



                                                  CDT, 0           

 

     Hydralazine            No                       Notes:           [facility]

[facility]



               3-25                               (Same as:           l



               06:03:                               Apresoline                                            ) Push           



                                                  over 5           



                                                  minutes           

 

     Ondansetron            No                       /= 4           [facility]

a



               3-25                               years,           l



               06:03:                               Pediatric           [place]



                                                Dosing           

 

     Acetaminoph            No                       Notes: Do           M

[facility]



     en 325 MG /      3-25                               not exceed           l



     Hydrocodone      06:03:                               4gm/day of           

[facility]



     Bitartrate      00                                 acetaminop           



     10 MG Oral                                         hen. (Same           



     Tablet                                         as: Norco           



     [Norco                                         325/10)           



     10/325]                                                        

 

     Dilaudid            No                       Notes:           [facility]



               3-25                               Same as           l



               06:03:                               Dilaudid                                                           

 

     Benadryl            No                       Notes:           [facility]



               3-25                               (Same as:           l



               06:03:                               Benadryl)                                                           

 

     phenol            No                       Notes:           [facility]



               3-25                               Chlorasept           l



               06:03:                               ic Spray                                            (Same as:           



                                                  Chlorasept           



                                                  ic, Sore           



                                                  Throat           



                                                  Spray)           



                                                  WASTE: F/P           



                                                  - Black; E           



                                                  -              



                                                  Municipal           



                                                  Trash Bin           

 

     Bisacodyl            No                       Notes:           [facility]

a



               3-25                               (Same As:           l



               06:03:                               Dulcolax,           Jose                                 Bisco-Lax)           

 

     Robaxin            No                       Notes:           [facility]



               3-25                               (Same           l



               06:03:                               as:Robaxin                                            )              

 

     Melatonin 3            No                       Notes:           [facility]

[facility]



     MG Extended      3-25                               (Same as:           l



     Release      06:03:                               Melatonin)           [facility]

[facility]



     Tablet      00                                                

 

     Tylenol            No                       Notes: Do           [facility]

ia



               3-25                               not exceed           l



               06:03:                               4 gm/day.                                            (Same as:           



                                                  Tylenol)           

 

     Reglan            No                       Notes:           [facility]



               3-25                               (Same as:           l



               06:03:                               Reglan)                                                           

 

     Phenergan            No                       Notes: Do           Mem

oria



               3-25                               not give           l



               06:03:                               IV push.                                            (Same as:           



                                                  Phenergan)           

 

     Saline            No                       Notes:           [facility]



     Flush 0.9%      3-25                               preservati           l



               06:03:                               ve free.           [place]                                                

 

     butalbital-      0 - No             2{tbl}      2 tablet,         

  [facility]



     acetaminoph      3-18 03-18                          Oral,           ity of



     en-caff      03:45: 03:10                          ONCE, 1           [place]



     (ESGIC)      00   :00                           dose, On           Medical



     -40                                         Thu            [facility]



     mg tablet 2                                         3/17/22 at           



     tablet                                         2245, ASAP           

 

     NaCl 0.9%      2022- No             500mL      at 999           [facility]

ers



     (NS) bolus      3-18 03-18                          mL/hr, 500           it

y of



     infusion      02:30: 03:17                          mL, IV           [place]



     500 mL      00   :00                           Infusion,           Medical



                                                  ONCE, 1           [facility]



                                                  dose, On           



                                                  u            



                                                  3/17/22 at           



                                                  2130, STAT           

 

     diphenhydrA      2022- No             25mg      25 mg,           Uni

vers



     MINE      3-18 03-18                          Slow IV           ity of



     (BENADRYL)      02:30: 01:46                          Push,           [place]



     injection      00   :00                           ONCE, 1           Medical



     25 mg                                         dose, On           [facility]            



                                                  3/17/22 at           



                                                  2130, STAT           

 

     magnesium      2022- No             2g        2 g, IV           Univ

ers



     sulfate in      3-18 03-18                          Piggyback,           it

y of



     water 2      02:15: 03:17                          Administer           [facility]

as



     gram/50 mL      00   :00                           over 30           Medica

l



     (4 %)                                         Minutes,           [facility]



     infusion 2                                         ONCE, 1           



     g                                            dose, On           



                                                  u            



                                                  3/17/22 at           



                                                  2115,           



                                                  Routine           

 

     HYDROmorpho      2022- No             .5mg      0.5 mg,           Un

[facility]



     ne                                  Slow IV           ity of



     (DILAUDID)      01:30: 01:25                          Push,           [place]



     injection      00   :00                           ONCE, 1           Medical



     0.5 mg                                         dose, On           [place]



                                                  Tue            



                                                  22 at           



                                                  1930,           



                                                  Routine<br           



                                                  >Use           



                                                  approved           



                                                  by             



                                                  (Faculty):           



                                                  ADC            



                                                  PROVIDER           

 

     levoFLOXaci      2022- No        [id number] 750mg      Take 1        

   [facility]



     n 750 mg                                tablet by           ity o

f



     tablet      00:00: 05:59                          mouth           [place]



               00   :00                           daily for           Medical



                                                  4 days.           [facility]

 

     levoFLOXaci      2022- No        [id number] 750mg      Take 1        

   [facility]



     n 750 mg                                tablet by           ity o

f



     tablet      00:00: 05:59                          mouth           [place]



               00   :00                           daily for           Medical



                                                  4 days.           [place]

 

     OXYCODONE            Yes                      Take by           [facility]

rs



     HCL/ACETAMI      1-25                               mouth.           ity of



     NOPHEN      19:49:                                              [place]



     (PERCOCET      27                                                Medical



     ORAL)                                                        [place]

 

     OXYCODONE            Yes                      Take by           Unive

rs



     HCL/ACETAMI      1-25                               mouth.           ity of



     NOPHEN      19:49:                                              [place]



     (PERCOCET      27                                                Medical



     ORAL)                                                        [place]

 

     OXYCODONE            Yes                      Take by           Unive

rs



     HCL/ACETAMI      1-25                               mouth.           ity of



     NOPHEN      19:49:                                              [place]



     (PERCOCET      27                                                Medical



     ORAL)                                                        [place]

 

     OXYCODONE            Yes                      Take by           Unive

rs



     HCL/ACETAMI      1-25                               mouth.           ity of



     NOPHEN      19:49:                                              [place]



     (PERCOCET      27                                                Medical



     ORAL)                                                        [place]

 

     vancomycin      2022- No             125mg      125 mg,           Un

[facility]



     (VANCOCIN)                                Oral, QID,           it

y of



     capsule 125      18:00: 21:59                          25 doses,           

[place]



     mg        00   :00                           First dose           Medical



                                                  (after           [facility]



                                                  last           



                                                  modificati           



                                                  on) on 22 at           



                                                  1200, Last           



                                                  dose on           



                                                  22 at           



                                                  1200,           



                                                  ASAP<br>Fa           



                                                  culty           



                                                  member           



                                                  approving           



                                                  Non-formul           



                                                  maryanne            



                                                  medication           



                                                  :              



                                                  MEGADC<br&           



                                                  gt;Reason           



                                                  for            



                                                  non-formul           



                                                  maryanne use:           



                                                  SPECIFIC           



                                                  INDICATION           



                                                  FOR            



                                                  NONFORMULA           



                                                  RY             



                                                  PRODUCT<br           



                                                  >Reason           



                                                  for            



                                                  Anti-Infec           



                                                  tive:           



                                                  Documented           



                                                  Infection<           



                                                  br>Documen           



                                                  huma            



                                                  Infection           



                                                  Site:           



                                                  Abdominal<           



                                                  br>Duratio           



                                                  n of           



                                                  Therapy:           



                                                  14 days           

 

     levoFLOXaci            Yes            750mg      750 mg,           Un

[facility]



     n                                        Oral,           ity of



     (LEVAQUIN)      15:00:                               DAILY,           [place]



     tablet 750      00                                 First dose           Med

ical



     mg                                           (after           [facility]



                                                  last           



                                                  modificati           



                                                  on) on 22 at           



                                                  0900,           



                                                  Until           



                                                  Discontinu           



                                                  ed,            



                                                  ASAP<br>Re           



                                                  ason for           



                                                  Anti-Infec           



                                                  tive:           



                                                  Empiric           



                                                  Therapy           



                                                  for            



                                                  Suspected           



                                                  Infection<           



                                                  br>Empiric           



                                                  Therapy           



                                                  Site:           



                                                  Urine<br>D           



                                                  uration of           



                                                  therapy:           



                                                  72 hours           

 

     lactobacill      2022- No        [id number] .5mg      Take 1         

  [facility]                                  tablet by           ity of



     acidophilus      00:00: 05:59                          mouth 2           [facility]

[place]



               00   :00                           (two)           Medical



                                                  times           [place]



                                                  daily for           



                                                  30 days.           

 

     lactobacill      2022- No        [id number] .5mg      Take 1         

  [facility]



     us        -                          tablet by           ity of



     acidophilus      00:00: 05:59                          mouth 2           [facility]

[place]



               00   :00                           (two)           Medical



                                                  times           [facility]



                                                  daily for           



                                                  30 days.           

 

     vancomycin      2022- No        [id number] 125mg      Take 1         

  [facility]



     125 mg                                capsule by           ity of



     capsule      00:00: 05:59                          mouth 4           [place]



               00   :00                           (four)           Medical



                                                  times           [facility]



                                                  daily for           



                                                  5 days.           

 

     vancomycin      2022- No        [id number] 125mg      Take 1         

  [facility]



     125 mg                                capsule by           ity of



     capsule      00:00: 05:59                          mouth 4           [place]



               00   :00                           (four)           Medical



                                                  times           [facility]



                                                  daily for           



                                                  5 days.           

 

     traMADoL            Yes            50mg      50 mg,           [facility]



     (ULTRAM)                                     Oral,           ity of



     tablet 50      21:26:                               Q6HPRN,           [place]



     mg        10                                 Starting           Medical



                                                  on Mon           [facility]



                                                  22 at           



                                                  1526,           



                                                  Until           



                                                  Discontinu           



                                                  ed,            



                                                  Routine,           



                                                  Pain           



                                                  (scale           



                                                  4-6)           

 

     levoFLOXaci       No             250mg      250 mg,           U

nivers



     n                                   Oral, Q24H           ity of



     (LEVAQUIN)      19:30: 23:18                          ABX, First           

[place]



     tablet 250      00   :17                           dose           Medical



     mg                                           (after           [facility]



                                                  last           



                                                  modificati           



                                                  on) on 22 at           



                                                  1330,           



                                                  Until           



                                                  Discontinu           



                                                  ed,            



                                                  ASAP<br>Re           



                                                  ason for           



                                                  Anti-Infec           



                                                  tive:           



                                                  Empiric           



                                                  Therapy           



                                                  for            



                                                  Suspected           



                                                  Infection<           



                                                  br>Empiric           



                                                  Therapy           



                                                  Site:           



                                                  Urine<br>D           



                                                  uration of           



                                                  therapy:           



                                                  72 hours           

 

     HYDROmorpho       No             .5mg      0.5 mg,           Un

[facility]



     ne                                  Slow IV           ity of



     (DILAUDID)      20:45: 18:23                          Push,           [place]



     injection      00   :44                           Q6HPRN,           Medical



     0.5 mg                                         Starting           Branch



                                                  on Sun           



                                                  1/23/22 at           



                                                  1445,           



                                                  Until Mon           



                                                  22 at           



                                                  1223,           



                                                  Routine,           



                                                  Pain           



                                                  (scale           



                                                  7-10),           



                                                  breakthrou           



                                                  gh             



                                                  pain<br>Us           



                                                  e approved           



                                                  by             



                                                  (Faculty):           



                                                  ADC            



                                                  PROVIDER           

 

     oxyCODONE-a            Yes            2{tbl}      2 tablet,          

 [facility]



     cetaminophe                                     Oral,           ity of



     n         20:39:                               Q6HPRN,           [place]



     (PERCOCET)      03                                 Starting           Medic

al



     5-325 mg                                         on Sun           Branch



     per tablet                                         22 at           



     2 tablet                                         1439,           



                                                  Until           



                                                  Discontinu           



                                                  ed,            



                                                  Routine,           



                                                  Pain           



                                                  (scale           



                                                  7-10)           

 

     HYDROmorpho      2022- No             .5mg      0.5 mg,           Un

[place]



     ne                                  Slow IV           ity of



     (DILAUDID)      00:00: 23:41                          Push,           [place]



     injection      00   :00                           ONCE, 1           Medical



     0.5 mg                                         dose, On           Branch



                                                  Sat            



                                                  22 at           



                                                  1800,           



                                                  Routine<br           



                                                  >Use           



                                                  approved           



                                                  by             



                                                  (Faculty):           



                                                  ADC            



                                                  PROVIDER           

 

     ertapenem      2022- No             1000mg      1,000 mg,           

[facility]



     (INVANZ)                                IV             ity of



     1,000 mg in      15:45: 18:30                          Piggyback,          

 [place]



     NaCl 0.9%      00   :00                           Q24H ABX,           Medic

al



     (NS) 50 mL                                         First dose           Bra

[facility]



     MINI-BAG                                         on Sat           



                                                  22 at           



                                                  0945,           



                                                  Until           



                                                  Discontinu           



                                                  ed,            



                                                  Administer           



                                                  over 30           



                                                  Minutes,           



                                                  50             



                                                  mL<br>Reas           



                                                  on for           



                                                  Anti-Infec           



                                                  tive:           



                                                  Empiric           



                                                  Therapy           



                                                  for            



                                                  Suspected           



                                                  Infection<           



                                                  br>Empiric           



                                                  Therapy           



                                                  Site:           



                                                  Urine<br>D           



                                                  uration of           



                                                  therapy:           



                                                  72             



                                                  hours<br>R           



                                                  estricted           



                                                  use            



                                                  approved           



                                                  by: ADC           



                                                  PROVIDER           

 

     lactobacill            Yes            .5mg      0.5 mg,           Uni

vers



                                            Oral, BID,           ity of



     acidophilus      02:00:                               First dose           

[place]



     tablet 0.5      00                                 on Fri           Medical



     mg                                           22 at           Branch



                                                  ,           



                                                  Until           



                                                  Discontinu           



                                                  ed,            



                                                  Routine           

 

     vancomycin      2022- No             250mg      250 mg,           Un

[place]



     (VANCOCIN)                                Oral, QID,           it

y of



     capsule 250      02:00: 16:20                          First dose          

 [place]



     mg        00   :40                           (after           Medical



                                                  last           Branch



                                                  reorder)           



                                                  on 22 at           



                                                  ,           



                                                  Until           



                                                  Discontinu           



                                                  ed,            



                                                  ASAP&lt;br           



                                                  >Faculty           



                                                  member           



                                                  approving           



                                                  Non-formul           



                                                  maryanne            



                                                  medication           



                                                  :              



                                                  MEGADC<br>           



                                                  Reason for           



                                                  non-formul           



                                                  maryanne use:           



                                                  SPECIFIC           



                                                  INDICATION           



                                                  FOR            



                                                  NONFORMULA           



                                                  RY             



                                                  PRODUCT<br           



                                                  >Reason           



                                                  for            



                                                  Anti-Infec           



                                                  tive:           



                                                  Documented           



                                                  Infection<           



                                                  br>Documen           



                                                  huma            



                                                  Infection           



                                                  Site:           



                                                  Abdominal<           



                                                  br>Duratio           



                                                  n of           



                                                  Therapy:           



                                                  14 days           

 

     vancomycin       No             250mg      250 mg,           Un

[place]



     (VANCOCIN)                                Oral,           ity of



     capsule 250      00:45: 00:34                          ONCE, 1           [place]

[place]



     mg        00   :00                           dose, On           Medical



                                                  Fri            Branch



                                                  22 at           



                                                  1845,           



                                                  ASAP<br>Fa           



                                                  culty           



                                                  member           



                                                  approving           



                                                  Non-formul           



                                                  maryanne            



                                                  medication           



                                                  :              



                                                  MEGADC<br>           



                                                  Reason for           



                                                  non-formul           



                                                  maryanne use:           



                                                  SPECIFIC           



                                                  INDICATION           



                                                  FOR            



                                                  NONFORMULA           



                                                  RY             



                                                  PRODUCT<br           



                                                  >Reason           



                                                  for            



                                                  Anti-Infec           



                                                  tive:           



                                                  Documented           



                                                  Infection<           



                                                  br>Documen           



                                                  huma            



                                                  Infection           



                                                  Site:           



                                                  Abdominal<           



                                                  br>Duratio           



                                                  n of           



                                                  Therapy:           



                                                  14 days           

 

     lidocaine      2022- No             5mL       5 mL,           Univer

s



     1% (PF)                                Subcutaneo           ity o

f



     (XYLOCAINE)      23:45: 02:25                          us, ONCE,           

[place]



     injection 5      00   :00                           1 dose, On           Me

dical



     mL                                           Fri            Branch



                                                  22 at           



                                                  1745,           



                                                  Routine           

 

     NaCl 0.9%            Yes            10mL      10 mL,           Univer

s



     (NS)                                     Slow IV           ity of



     injection      23:38:                               Push, PRN,           Te

[place]



     10 mL      41                                 Starting           Medical



                                                  on Fri           Branch



                                                  22 at           



                                                  1738,           



                                                  Until           



                                                  Discontinu           



                                                  ed,            



                                                  Routine,           



                                                  line           



                                                  maintenanc           



                                                  e              

 

     meropenem       No             1g        1 g, IV           Univ

ers



     (MERREM) 1                                Piggyback,           it

y of



     g in NaCl      23:15: 14:33                          Q8H ABX,           Eric

as



     0.9% (NS)      00   :35                           First dose           Medi

sar[place]



     100 mL                                         (after           Branch



     MINI-BAG                                         last           



                                                  modificati           



                                                  on) on Thu           



                                                  22 at           



                                                  1715,           



                                                  Until           



                                                  Discontinu           



                                                  ed,            



                                                  Administer           



                                                  over 60           



                                                  Minutes,           



                                                  100            



                                                  mL<br>Rest           



                                                  ricted use           



                                                  approved           



                                                  by: ADC           



                                                  PROVIDER<b           



                                                  r&gt;Reaso           



                                                  n for           



                                                  Anti-Infec           



                                                  tive:           



                                                  Documented           



                                                  Infection<           



                                                  br>Documen           



                                                  huma            



                                                  Infection           



                                                  Site:           



                                                  Urine<br>D           



                                                  uration of           



                                                  Therapy: 7           



                                                  days           

 

     enoxaparin            Yes            40mg      40 mg,           Unive

rs



     (LOVENOX)                                     Subcutaneo           ity 

of



     injection      23:00:                               us, DAILY,           Te

[place]



     40 mg      00                                 First dose           Medical



                                                  on u           Branch



                                                  22 at           



                                                  1700,           



                                                  Until           



                                                  Discontinu           



                                                  ed,            



                                                  Routine           

 

     meropenem      2022- No             1g        1 g, IV           Univ

ers



     (MERREM)                           Piggyback,           it

y of



     g in NaCl      16:00: 20:33                          Q8H ABX,           Reic

as



     0.9% (NS)      00   :04                           First dose           Medi

sarah



     100 mL                                         (after           Branch



     MINI-BAG                                         last           



                                                  reorder)           



                                                  on u           



                                                  22 at           



                                                  1000,           



                                                  Until           



                                                  Discontinu           



                                                  ed,            



                                                  Administer           



                                                  over 60           



                                                  Minutes,           



                                                  100            



                                                  mL<br>Rest           



                                                  ricted use           



                                                  approved           



                                                  by: ADC           



                                                  PROVIDER<b           



                                                  r>Reason           



                                                  for            



                                                  Anti-Infec           



                                                  tive:           



                                                  Documented           



                                                  Infection<           



                                                  br>Documen           



                                                  huma            



                                                  Infection           



                                                  Site:           



                                                  Urine<br>D           



                                                  uration of           



                                                  Therapy:           



                                                  Other (see           



                                                  Comments)           

 

     HYDROmorpho      2022- No             .5mg      0.5 mg,           Un

[place]



     ne                                  Slow IV           ity of



     (DILAUDID)      14:29: 20:41                          Push,           [place]



     injection      58   :17                           Q4HPRN,           Medical



     0.5 mg                                         Starting           Branch



                                                  on u           



                                                  22 at           



                                                  0829,           



                                                  Until Sun           



                                                  22 at           



                                                  1441,           



                                                  Routine,           



                                                  Pain           



                                                  (scale           



                                                  7-10)<br>U           



                                                  se             



                                                  approved           



                                                  by             



                                                  (Faculty):           



                                                  ADC            



                                                  PROVIDER           

 

     proMETHazin            Yes            25mg      25 mg,           Univ

ers



     e                                        Oral,           ity of



     (PHENERGAN)      09:42:                               Q4HPRN,           Eric

as



     tablet 25      07                                 Starting           Medica

l



     mg                                           on u           Branch



                                                  22 at           



                                                  0342,           



                                                  Until           



                                                  Discontinu           



                                                  ed,            



                                                  Routine,           



                                                  Nausea and           



                                                  Vomiting           



                                                  (N/V)           

 

     HYDROmorpho      2022- No             .5mg      0.5 mg,           Un

[facility]



     ne                                  Slow IV           ity of



     (DILAUDID)      09:41: 12:04                          Push,           [place]



     injection      36   :38                           Q4HPRN,           Medical



     0.5 mg                                         Starting           [facility]



                                                  on u           



                                                  22 at           



                                                  0341,           



                                                  Until u           



                                                  22 at           



                                                  0604,           



                                                  Routine,           



                                                  Pain           



                                                  (scale           



                                                  7-10)<br>U           



                                                  se             



                                                  approved           



                                                  by             



                                                  (Faculty):           



                                                  [facility]            



                                                  PROVIDER           

 

     proCHLORper            Yes            10mg      10 mg,           Univ

ers



     azine                                     Slow IV           ity of



     (COMPAZINE)      09:07:                               Push,           [place]



     injection      27                                 Q6HPRN,           Medical



     10 mg                                         Starting           [facility]



                                                  on u           



                                                  22 at           



                                                  0307,           



                                                  Until           



                                                  Discontinu           



                                                  ed,            



                                                  Routine,           



                                                  Nausea and           



                                                  Vomiting           



                                                  (N/V)           

 

     acetaminoph            Yes            650mg      650 mg,           Un

[facility]



     en                                       Oral,           ity of



     (TYLENOL)      09:07:                               Q6HPRN,           [place]



     tablet 650      10                                 Starting           Medic

al



     mg                                           on u           [facility]



                                                  22 at           



                                                  0307,           



                                                  Until           



                                                  Discontinu           



                                                  ed,            



                                                  Routine,           



                                                  Pain           



                                                  (scale           



                                                  1-3)           

 

     meropenem      2022- No             1g        1 g, IV           Univ

ers



     (MERREM) 1                                Piggyback,           it

y of



     g in NaCl      07:15: 08:49                          ONCE, 1           [place]

[facility]



     0.9% (NS)      00   :00                           dose, On           Medica

l



     100 mL                                         [facility]



     MINI-BAG                                         22 at           



                                                  0115,           



                                                  Administer           



                                                  over 60           



                                                  Minutes,           



                                                  100            



                                                  mL<br>Rest           



                                                  ricted use           



                                                  approved           



                                                  by: [facility]           



                                                  PROVIDER<b           



                                                  r>Reason           



                                                  for            



                                                  Anti-Infec           



                                                  tive:           



                                                  Documented           



                                                  Infection<           



                                                  br>Documen           



                                                  huma            



                                                  Infection           



                                                  Site:           



                                                  Urine<br>D           



                                                  uration of           



                                                  Therapy:           



                                                  Other (see           



                                                  Comments)           

 

     cefdinir      2022- No             300mg      300 mg,           Univ

ers



     (OMNICEF)                                Oral,           ity of



     capsule 300      03:30: 02:55                          ONCE, 1           [facility]

[place]



     mg        00   :00                           dose, On           Medical



                                                  Mon            [facility]



                                                  22 at           



                                                  2130,           



                                                  ASAP<br>Re           



                                                  ason for           



                                                  Anti-Infec           



                                                  tive:           



                                                  Documented           



                                                  Infection<           



                                                  br>Documen           



                                                  huma            



                                                  Infection           



                                                  Site:           



                                                  Urine<br>D           



                                                  uration of           



                                                  Therapy: 7           



                                                  days           

 

     FENTanyl PF      2022- No             50ug      50 mcg,           Un

[facility]



     (SUBLIMAZE                                Slow IV           ity o

f



     (PF))      01:15: 03:26                          Push,           [place]



     injection      00   :00                           ONCE, 1           Medical



     50 mcg                                         dose, On           [facility]



                                                  Mon            



                                                  22 at           



                                                  1915, STAT           

 

     proMETHazin      2022- No             25mg      25 mg, IV           

[facility]



     e                                   Piggyback,           ity of



     (PHENERGAN)      01:15: 03:26                          ONCE, 1           [facility]

[facility]



     25 mg in      00   :00                           dose, On           Medical



     NaCl 0.9%                                         Mon            [facility]



     (NS) 50 mL                                         22 at           



     piggyback                                         1915, 50           



                                                  mL             

 

     cefdinir      2022- No        [id number] 300mg      Take 1           U

[facility]



     300 mg                                capsule by           ity of



     capsule      00:00: 05:59                          mouth           [place]



               00   :00                           every 12           Medical



                                                  (twelve)           [facility]



                                                  hours for           



                                                  10 days.           

 

     cefdinir      -0 - No        [id number] 300mg      Take 1           U

n[facility]



     300 mg                                capsule by           ity of



     capsule      00:00: 00:00                          mouth           [place]



               00   :00                           every 12           Medical



                                                  (twelve)           [facility]



                                                  hours for           



                                                  10 days.           

 

     FENTanyl PF      2022- No             50ug      50 mcg,           Un

[facility]



     (SUBLIMAZE                                Slow IV           ity o

f



     (PF))      02:00: 01:19                          Push,           [place]



     injection      00   :00                           ONCE, 1           Medical



     50 mcg                                         dose, On           [facility]



                                                  Sat            



                                                  1/15/22 at           



                                                  ,           



                                                  Routine           

 

     methocarbam      2022- No             1000mg      1,000 mg,         

  [facility]



     oL                                  Oral,           ity of



     (ROBAXIN)      02:00: 01:19                          ONCE, 1           [place]

[place]



     tablet      00   :00                           dose, On           Medical



     1,000 mg                                         Sat            [facility]



                                                  1/15/22 at           



                                                  2000, ASAP           

 

     proMETHazin      2022- No             12.5mg      12.5 mg,          

 [facility]



     e         1-15 01-15                          IV             ity of



     (PHENERGAN)      22:46: 23:00                          [place], [place]



     12.5 mg in      00   :00                           ONCE, 1           Medica

l



     NaCl 0.9%                                         dose, On           [facility]



     (NS) 50 mL                                         Sat            



     piggyback                                         1/15/22 at           



                                                  1700, 50           



                                                  mL             

 

     FENTanyl PF      2022- No             50ug      50 mcg,           Un

[facility]



     (SUBLIMAZE      1-15 01-15                          Slow IV           ity o

f



     (PF))      22:45: 23:00                          Push,           [place]



     injection      00   :00                           ONCE, 1           Medical



     50 mcg                                         dose, On           [facility]



                                                  Sat            



                                                  1/15/22 at           



                                                  1645,           



                                                  Routine           

 

     methocarbam            Yes       [id number] 1000mg      Take 2         

  [facility]



     oL 500 mg      1-15                               tablets by           ity 

of



     tablet      00:00:                               mouth 4           [place]



               00                                 (four)           Medical



                                                  times           [facility]



                                                  daily as           



                                                  needed for           



                                                  Pain           



                                                  (scale           



                                                  1-3).           

 

     methocarbam      -0      Yes       [id number] 1000mg      Take 2         

  [facility]



     oL 500 mg      1-15                               tablets by           ity 

of



     tablet      00:00:                               mouth 4           [place]



               00                                 (four)           Medical



                                                  times           [facility]



                                                  daily as           



                                                  needed for           



                                                  Pain           



                                                  (scale           



                                                  1-3).           

 

     methocarbam      -0      Yes       [id number] 1000mg      Take 2         

  [facility]



     oL 500 mg      1-15                               tablets by           ity 

of



     tablet      00:00:                               mouth 4           [place]



               00                                 (four)           Medical



                                                  times           [facility]



                                                  daily as           



                                                  needed for           



                                                  Pain           



                                                  (scale           



                                                  1-3).           

 

     methocarbam      -0      Yes       [id number] 1000mg      Take 2         

  [facility]



     oL 500 mg      1-15                               tablets by           ity 

of



     tablet      00:00:                               mouth 4           [place]



               00                                 (four)           Medical



                                                  times           [facility]



                                                  daily as           



                                                  needed for           



                                                  Pain           



                                                  (scale           



                                                  1-3).           

 

     methocarbam      -0      Yes       [id number] 1000mg      Take 2         

  [facility]



     oL 500 mg      1-15                               tablets by           ity 

of



     tablet      00:00:                               mouth 4           [place]



               00                                 (four)           Medical



                                                  times           [facility]



                                                  daily as           



                                                  needed for           



                                                  Pain           



                                                  (scale           



                                                  1-3).           

 

     methocarbam      -0      Yes       [id number] 1000mg      Take 2         

  [facility]



     oL 500 mg      1-15                               tablets by           ity 

of



     tablet      00:00:                               mouth 4           [place]



               00                                 (four)           Medical



                                                  times           [facility]



                                                  daily as           



                                                  needed for           



                                                  Pain           



                                                  (scale           



                                                  1-3).           

 

     methocarbam      -0 - No        [id number] 1000mg      Take 2        

   [facility]



     oL 500 mg      1-15 05-11                          tablets by           ity

 of



     tablet      00:00: 00:00                          mouth 4           [place]



               00   :00                           (four)           Medical



                                                  times           [facility]



                                                  daily as           



                                                  needed for           



                                                  Pain           



                                                  (scale           



                                                  1-3).           

 

     proMETHazin      2021- No             25mg      25 mg, IV           

[facility]



     e                                   Piggyback,           ity of



     (PHENERGAN)      23:15: 22:20                          ONCE, 1           [facility]

[facility]



     25 mg in      00   :00                           dose, On           Medical



     NaCl 0.9%                                         Wed            [facility]



     (NS) 50 mL                                         21           



     piggyback                                         at 1715,           



                                                  50 mL           

 

     FENTanyl PF      2021- No             75ug      75 mcg,           Un

[facility]



     (SUBLIMAZE                                Slow IV           ity o

f



     (PF))      22:15: 22:20                          Push,           [place]



     injection      00   :00                           ONCE, 1           Medical



     75 mcg                                         dose, On           [facility]



                                                  Wed            



                                                  21           



                                                  at 1615,           



                                                  Routine           

 

     fidaxomicin      2021      Yes       [id number] 200mg      Take 1          

 [facility]



     200 mg      1-24                               tablet by           ity of



     tablet      00:00:                               mouth 2           [place]



               00                                 (two)           Medical



                                                  times           [facility]



                                                  daily.           

 

     proMETHazin      2021      Yes       [id number] 25mg      Take 1           

[facility]



     e 25 mg      1-24                               tablet by           ity of



     tablet      00:00:                               mouth           [place]



               00                                 every 6           Medical



                                                  (six)           [facility]



                                                  hours as           



                                                  needed for           



                                                  Nausea and           



                                                  Vomiting           



                                                  (N/V).           

 

     fidaxomicin      2021      Yes       [id number] 200mg      Take 1          

 [facility]



     200 mg      1-24                               tablet by           ity of



     tablet      00:00:                               mouth 2           [place]



                                                (two)           Medical



                                                  times           [facility]



                                                  daily.           

 

     proMETHazin      2021      Yes       [id number] 25mg      Take 1           

[facility]



     e 25 mg      1-24                               tablet by           ity of



     tablet      00:00:                               mouth           [place]



               00                                 every 6           Medical



                                                  (six)           [facility]



                                                  hours as           



                                                  needed for           



                                                  Nausea and           



                                                  Vomiting           



                                                  (N/V).           

 

     cholestyram      2021      Yes                                     Un[facility]

s



     ine 4 gram      1-24                                              ity of



     packet      00:00:                                              [place]



                                                               [facility]



                                                                 [facility]

 

     fidaxomicin      2021      Yes       [id number] 200mg      Take 1          

 [facility]



     200 mg      1-24                               tablet by           ity of



     tablet      00:00:                               mouth 2           [place]



                                                (two)           Medical



                                                  times           [facility]



                                                  daily.           

 

     proMETHazin      2021      Yes       [id number] 25mg      Take 1           

[facility]



     e 25 mg      1-24                               tablet by           ity of



     tablet      00:00:                               mouth           [place]



               00                                 every 6           Medical



                                                  (six)           [facility]



                                                  hours as           



                                                  needed for           



                                                  Nausea and           



                                                  Vomiting           



                                                  (N/V).           

 

     cholestyram      2021      Yes                                     [facility]

s



     ine 4 gram      1-24                                              ity of



     packet      00:00:                                              [place]



                                                               Medical



                                                                 [facility]

 

     cholestyram      2021      Yes                                     Univer

s



     ine 4 gram      1-24                                              ity of



     packet      00:00:                                              [place]



                                                               [facility]



                                                                 [facility]anch

 

     cholestyram      2021      Yes                                     Univer

s



     ine 4 gram      1-24                                              ity of



     packet      00:00:                                              [place]



               00                                                [facility]



                                                                 [facility]anch

 

     cholestyram      2021      Yes                                     Univer

s



     ine 4 gram      1-24                                              ity of



     packet      00:00:                                              [place]



                                                               [facility]



                                                                 [facility]anch

 

     cholestyram      2021      Yes                                     Univer

s



     ine 4 gram      1-24                                              ity of



     packet      00:00:                                              [place]



               00                                                [facility]



                                                                 Branch

 

     fidaxomicin      2021      Yes       [id number] 200mg      Take 1          

 [facility]



     200 mg      1-24                               tablet by           ity of



     tablet      00:00:                               mouth 2           [place]



               00                                 (two)           Medical



                                                  times           Branch



                                                  daily.           

 

     proMETHazin      2021      Yes       [id number] 25mg      Take 1           

[facility]



     e 25 mg      1-24                               tablet by           ity of



     tablet      00:00:                               mouth           [place]



               00                                 every 6           Medical



                                                  (six)           Branch



                                                  hours as           



                                                  needed for           



                                                  Nausea and           



                                                  Vomiting           



                                                  (N/V).           

 

     cholestyram      2021- No                                      Unive

rs



     ine 4 gram      -24 05-11                                         ity of



     packet      00:00: 00:00                                         [place]



               00   :00                                          [facility]



                                                                 Branch

 

     fidaxomicin      2021- No        [id number] 200mg      Take 1         

  [facility]



     200 mg      -24 -25                          tablet by           ity of



     tablet      00:00: 00:00                          mouth 2           [place]



               00   :00                           (two)           Medical



                                                  times           Branch



                                                  daily.           

 

     proMETHazin      2021- No        [id number] 25mg      Take 1          

 [facility]



     e 25 mg      -24 -25                          tablet by           ity of



     tablet      00:00: 00:00                          mouth           [place]



               00   :00                           every 6           Medical



                                                  (six)           Branch



                                                  hours as           



                                                  needed for           



                                                  Nausea and           



                                                  Vomiting           



                                                  (N/V).           

 

     FENTanyl PF       No             50ug      50 mcg,           Un

[facility]



     (SUBLIMAZE      5-10 05-10                          Intravenou           it

y of



     (PF))      02:45: 01:34                          s, ONCE, 1           [place]



     injection      00   :00                           dose, Sun           Medic

al



     50 mcg                                         21 at           Branch



                                                  2145,           



                                                  Routine           

 

     cefTRIAXone      2021- No             1000mg      1,000 mg,         

  [facility]



     (ROCEPHIN)      5-10 05-10                          IV             ity of



     1,000 mg in      02:30: 01:55                          Piggyback,          

 [place]



     NaCl 0.9%      00   :00                           ONCE, 1           Medical



     (NS) 50 mL                                         dose, Sun           [facility]

ch



     MINI-BAG                                         21 at           



                                                  2130, 50           



                                                  mL<br>Reas           



                                                  on for           



                                                  Anti-Infec           



                                                  tive:           



                                                  Documented           



                                                  Infection<           



                                                  br>Documen           



                                                  huma            



                                                  Infection           



                                                  Site:           



                                                  Urine<br>D           



                                                  uration of           



                                                  Therapy: 7           



                                                  days           

 

     famotidine      2021- No             20mg      20 mg,           Univ

ers



     (PEPCID      5-10 05-10                          Slow IV           ity of



     (PF))      01:00: 00:12                          Push,           [place]



     injection      00   :00                           ONCE, 1           Medical



     20 mg                                         dose, Sun           Branch



                                                  21 at           



                                                  2000, ASAP           

 

     proMETHazin      2021- No             25mg      25 mg, IV           

[facility]



     e         5-10 05-10                          Piggyback,           ity of



     (PHENERGAN)      00:45: 00:11                          ONCE, 1           [facility]

[facility]



     25 mg in      00   :00                           dose, Sun           Medica

l



     NaCl 0.9%                                         21 at           [facility]

h



     (NS) 50 mL                                         1945, 50           



     piggyback                                         mL             

 

     FENTanyl PF      2021- No             50ug      50 mcg,           Un

[facility]



     (SUBLIMAZE      5-10 05-10                          Intravenou           it

y of



     (PF))      00:45: 00:12                          s, ONCE, 1           [place]



     injection      00   :00                           dose, Sun           Medic

al



     50 mcg                                         21 at           Branch



                                                  1945,           



                                                  Routine           

 

     NaCl 0.9%      2021- No             1000mL      at 999           Uni

vers



     (NS) bolus      5-10 05-10                          mL/hr,           ity of



     infusion      00:00: 01:47                          1,000 mL,           Eric

as



     1,000 mL      00   :00                           IV             Medical



                                                  Infusion,           [place]



                                                  ONCE, 1           



                                                  dose, [place]           



                                                  21 at           



                                                  1900, ASAP           

 

     cefdinir      2021- No        [id number] 300mg      Take 1           U

nivers



     300 mg       05-20                          capsule by           ity of



     capsule      00:00: 04:59                          mouth 2           [place]



               00   :00                           (two)           Medical



                                                  times           [place]



                                                  daily for           



                                                  10 days.           

 

     buPROPion      2021- No             150mg QD   Take 1           [facility]



     (WELLBUTRIN      17 -16                          tablet           [facility]



     XL) 150 MG      00:00: 23:59                          (150 mg           Med

ical



     24 hr      00   :00                           total) by           Center



     tablet                                         mouth           



                                                  daily.           

 

     citalopram      2021- No             40mg QD   Take 1           [facility]



     (CELEXA) 40      -17 -16                          tablet (40           [facility]

[facility]



     MG tablet      00:00: 23:59                          mg total)           Me

dical



               00   :00                           by mouth           Center



                                                  daily.           

 

     oxyCODONE-a            Yes            1{tbl}      Take 1           [facility]



     cetaminophe      1-16                               tablet by           [facility]         14:44:                               mouth           Medical



     (PERCOCET)      50                                 every 4           Center



      mg                                         (four)           



     per tablet                                         hours as           



                                                  needed for           



                                                  Pain.           

 

     oxyCODONE-a      2020-0      Yes            1{tbl}      Take 1           [facility]



     cetaminophe      1-16                               tablet by           Ni

es



     n         14:44:                               mouth           Medical



     (PERCOCET)      50                                 every 4           Center



      mg                                         (four)           



     per tablet                                         hours as           



                                                  needed for           



                                                  Pain.           

 

     oxyCODONE-a      2020-0      Yes            1{tbl}      Take 1           [facility]



     cetaminophe      1-16                               tablet by           Ni

es



     n         14:44:                               mouth           Medical



     (PERCOCET)      50                                 every 4           Center



      mg                                         (four)           



     per tablet                                         hours as           



                                                  needed for           



                                                  Pain.           

 

     oxyCODONE-a      2020-0      Yes            1{tbl}      Take 1           [facility]



     cetaminophe      1-16                               tablet by           Ni

es



     n         14:44:                               mouth           Medical



     (PERCOCET)      50                                 every 4           Center



      mg                                         (four)           



     per tablet                                         hours as           



                                                  needed for           



                                                  Pain.           

 

     oxyCODONE-a      2020-0      Yes            1{tbl}      Take 1           [facility]



     cetaminophe      1-16                               tablet by           Ni

es



     n         14:44:                               mouth           Medical



     (PERCOCET)      50                                 every 4           Center



      mg                                         (four)           



     per tablet                                         hours as           



                                                  needed for           



                                                  Pain.           

 

     oxyCODONE-a      2020-0      Yes            1{tbl}      Take 1           [facility]



     cetaminophe      1-16                               tablet by           Ni

es



     n         14:44:                               mouth           Medical



     (PERCOCET)      50                                 every 4           Center



      mg                                         (four)           



     per tablet                                         hours as           



                                                  needed for           



                                                  Pain.           

 

     oxyCODONE-a      2020-0      Yes            1{tbl}      Take 1           [facility]



     cetaminophe      1-16                               tablet by           Ni





     n         14:44:                               mouth           Medical



     (PERCOCET)      50                                 every 4           Center



      mg                                         (four)           



     per tablet                                         hours as           



                                                  needed for           



                                                  Pain.           

 

     zinc      2020-0      Yes            5g   Q.5D Apply 5 g           [facility]



     oxide-yuan      1-16                               topically           Ni

es



     latum      00:00:                               2 (two)           Medical



     (CRITIC-AID      00                                 times           Center



     ) 20-51 %                                         daily.           



     Pste                                                        



     topical                                                        



     paste                                                        

 

     meropenem      2020-0      Yes            1g        Inject 1 g           [facility]



     (MERREM)      1-16                               intravenou           [facility]



     MBP 1 gm in      00:00:                               sly every           M

edical



     100 mL NS      00                                 8 (eight)           [facility]

r



                                                  hours.           

 

     insulin      2020-0      Yes            58U  Q.5D Inject 58           [facility]

[facility]



     glargine      1-16                               Units           [facility]



     (LANTUS)      00:00:                               subcutaneo           Med

ical



     100 unit/mL      00                                 usly 2           Center



     injection                                         (two)           



                                                  times           



                                                  daily Use           



                                                  as             



                                                  directed.           

 

     zinc      2020-0      Yes            5g   Q.5D Apply 5 g           [facility]



     oxide-yuan      1-16                               topically           Ni

es



     latum      00:00:                               2 (two)           Medical



     (CRITIC-AID      00                                 times           Center



     ) 20-51 %                                         daily.           



     Pste                                                        



     topical                                                        



     paste                                                        

 

     meropenem      2020-0      Yes            1g        Inject 1 g           [facility]



     (MERREM)      1-16                               intravenou           [facility]



     MBP 1 gm in      00:00:                               sly every           M

edical



     100 mL NS      00                                 8 (eight)           [facility]

r



                                                  hours.           

 

     insulin      2020-0      Yes            58U  Q.5D Inject 58           [facility]

[facility]



     glargine      1-16                               Units           [facility]



     (LANTUS)      00:00:                               subcutaneo           Med

ical



     100 unit/mL      00                                 usly 2           Center



     injection                                         (two)           



                                                  times           



                                                  daily Use           



                                                  as             



                                                  directed.           

 

     zinc      2020-0      Yes            5g   Q.5D Apply 5 g           [facility]



     oxide-yuan      1-16                               topically           Ni

es



     latum      00:00:                               2 (two)           Medical



     (CRITIC-AID      00                                 times           Center



     ) 20-51 %                                         daily.           



     Pste                                                        



     topical                                                        



     paste                                                        

 

     meropenem      2020-0      Yes            1g        Inject 1 g           [facility]



     (MERREM)      1-16                               intravenou           [facility]



     MBP 1 gm in      00:00:                               sly every           M

edical



     100 mL NS      00                                 8 (eight)           [facility]

r



                                                  hours.           

 

     insulin      2020-0      Yes            58U  Q.5D Inject 58           [facility]

t



     glargine      1-16                               Units           [facility]



     (LANTUS)      00:00:                               subcutaneo           Med

ical



     100 unit/mL      00                                 usly 2           Center



     injection                                         (two)           



                                                  times           



                                                  daily Use           



                                                  as             



                                                  directed.           

 

     zinc      2020-0      Yes            5g   Q.5D Apply 5 g           [facility]



     oxide-yuan      1-16                               topically           Ni

es



     latum      00:00:                               2 (two)           Medical



     (CRITIC-AID      00                                 times           Center



     ) 20-51 %                                         daily.           



     Pste                                                        



     topical                                                        



     paste                                                        

 

     meropenem      2020-0      Yes            1g        Inject 1 g           [facility]



     (MERREM)      1-16                               intravenou           [facility]



     MBP 1 gm in      00:00:                               sly every           M

edical



     100 mL NS      00                                 8 (eight)           [facility]

r



                                                  hours.           

 

     insulin      2020-0      Yes            58U  Q.5D Inject 58           [facility]

t



     glargine      1-16                               Units           [facility]



     (LANTUS)      00:00:                               subcutaneo           Med

ical



     100 unit/mL      00                                 usly 2           Center



     injection                                         (two)           



                                                  times           



                                                  daily Use           



                                                  as             



                                                  directed.           

 

     zinc      2020-0      Yes            5g   Q.5D Apply 5 g           [facility]



     oxide-yuan      1-16                               topically           Ni

es



     latum      00:00:                               2 (two)           Medical



     (CRITIC-AID      00                                 times           Center



     ) 20-51 %                                         daily.           



     Pste                                                        



     topical                                                        



     paste                                                        

 

     meropenem      2020-0      Yes            1g        Inject 1 g           [facility]



     (MERREM)      1-16                               intravenou           [facility]



     MBP 1 gm in      00:00:                               sly every           M

edical



     100 mL NS      00                                 8 (eight)           [facility]

r



                                                  hours.           

 

     insulin      2020-0      Yes            58U  Q.5D Inject 58           [facility]

t



     glargine      1-16                               Units           [facility]



     (LANTUS)      00:00:                               subcutaneo           Med

ical



     100 unit/mL      00                                 usly 2           Center



     injection                                         (two)           



                                                  times           



                                                  daily Use           



                                                  as             



                                                  directed.           

 

     zinc      2020-0      Yes            5g   Q.5D Apply 5 g           [facility]



     oxide-yuan      1-16                               topically           Ni

es



     latum      00:00:                               2 (two)           Medical



     (CRITIC-AID      00                                 times           Center



     ) 20-51 %                                         daily.           



     Pste                                                        



     topical                                                        



     paste                                                        

 

     meropenem      2020-0      Yes            1g        Inject 1 g           [facility]



     (MERREM)      1-16                               intravenou           [facility]



     MBP 1 gm in      00:00:                               sly every           M

edical



     100 mL NS      00                                 8 (eight)           [facility]

r



                                                  hours.           

 

     insulin      2020-0      Yes            58U  Q.5D Inject 58           [facility]

t



     glargine      1-16                               Units           [facility]



     (LANTUS)      00:00:                               subcutaneo           [facility]

ical



     100 unit/mL      00                                 usly 2           [facility]



     injection                                         (two)           



                                                  times           



                                                  daily Use           



                                                  as             



                                                  directed.           

 

     zinc      2020-0      Yes            5g   Q.5D Apply 5 g           [facility]



     oxide-yuan      1-16                               topically           [facility]

es



     latum      00:00:                               2 (two)           [facility]



     (CRITIC-AID      00                                 times           [facility]



     ) 20-51 %                                         daily.           



     Pste                                                        



     topical                                                        



     paste                                                        

 

     meropenem      2020-0      Yes            1g        Inject 1 g           [facility]

[facility]



     (MERREM)      1-16                               intravenou           [facility]



     MBP 1 gm in      00:00:                               sly every           [facility]

edical



     100 mL NS      00                                 8 (eight)           [facility]

r



                                                  hours.           

 

     insulin      -      Yes            58U  Q.5D Inject 58           [facility]

t



     glargine      1-16                               Units           [facility]



     (LANTUS)      00:00:                               subcutaneo           Med

ical



     100 unit/mL      00                                 usly 2           [facility]



     injection                                         (two)           



                                                  times           



                                                  daily Use           



                                                  as             



                                                  directed.           

 

     insulin      -2021- No             0U        Inject           [facility]



     lispro      1-16 01-15                          0-12 Units           [facility]



     (HUMALOG)      00:00: 23:59                          subcutaneo           [facility]

edical



     100 unit/mL      00   :00                           usly 3           [facility]



     injection                                         (three)           



                                                  times           



                                                  daily           



                                                  before           



                                                  meals.           

 

     insulin      -2021- No             25U       Inject 25           [facility]



     lispro      1-16 01-15                          Units           [facility]



     (HUMALOG)      00:00: 23:59                          subcutaneo           [facility]

edical



     100 unit/mL      00   :00                           usly 3           [facility]



     injection                                         (three)           



                                                  times           



                                                  daily           



                                                  before           



                                                  meals.           

 

     normal      2018      No                       1,000 mL,           [facility]

[facility]



     saline 0.9%      1-08                               Rate: 100           l



     IV 1,000 mL      11:04:                               ml/hr,           [facility]

[facility]



               00                                 Infuse           



                                                  over: 10           



                                                  hr, Route:           



                                                  IV, Dosing           



                                                  Weight           



                                                  131.818           



                                                  kg, Total           



                                                  Volume:           



                                                  1,000,           



                                                  Start           



                                                  date:           



                                                  18           



                                                  5:04:00           



                                                  CST,           



                                                  Duration:           



                                                  30 day,           



                                                  Stop date:           



                                                  18           



                                                  5:03:00           



                                                  CST, 2.4,           



                                                  m2             

 

     normal      2018      No                       1,000 mL,           [facility]

a



     saline 0.9%      1-08                               Rate: 100           l



     IV 1,000 mL      11:04:                               ml/hr,           [facility]

[facility]



               00                                 Infuse           



                                                  over: 10           



                                                  hr, Route:           



                                                  IV, Dosing           



                                                  Weight           



                                                  131.818           



                                                  kg, Total           



                                                  Volume:           



                                                  1,000,           



                                                  Start           



                                                  date:           



                                                  18           



                                                  5:04:00           



                                                  CST,           



                                                  Duration:           



                                                  30 day,           



                                                  Stop date:           



                                                  18           



                                                  5:03:00           



                                                  CST, 2.4,           



                                                  m2             

 

     normal      2018      No                       1,000 mL,           [facility]

a



     saline 0.9%      1-08                               Rate: 100           l



     IV 1,000 mL      11:04:                               ml/hr,           [facility]

[facility]



               00                                 Infuse           



                                                  over: 10           



                                                  hr, Route:           



                                                  IV, Dosing           



                                                  Weight           



                                                  131.818           



                                                  kg, Total           



                                                  Volume:           



                                                  1,000,           



                                                  Start           



                                                  date:           



                                                  18           



                                                  5:04:00           



                                                  CST,           



                                                  Duration:           



                                                  30 day,           



                                                  Stop date:           



                                                  18           



                                                  5:03:00           



                                                  CST, 2.4,           



                                                  m2             

 

     normal      2018      No                       1,000 mL,           [facility]

[facility]



     saline 0.9%      1-08                               Rate: 100           l



     IV 1,000 mL      11:04:                               ml/hr,           [facility]

[facility]



               00                                 Infuse           



                                                  over: 10           



                                                  hr, Route:           



                                                  IV, Dosing           



                                                  Weight           



                                                  131.818           



                                                  kg, Total           



                                                  Volume:           



                                                  1,000,           



                                                  Start           



                                                  date:           



                                                  18           



                                                  5:04:00           



                                                  CST,           



                                                  Duration:           



                                                  30 day,           



                                                  Stop date:           



                                                  18           



                                                  5:03:00           



                                                  CST, 2.4,           



                                                  m2             

 

     normal      2018      No                       1,000 mL,           [facility]

[facility]



     saline 0.9%      1-08                               Rate: 100           l



     IV 1,000 mL      11:04:                               ml/hr,           [facility]

[facility]



               00                                 Infuse           



                                                  over: 10           



                                                  hr, Route:           



                                                  IV, Dosing           



                                                  Weight           



                                                  131.818           



                                                  kg, Total           



                                                  Volume:           



                                                  1,000,           



                                                  Start           



                                                  date:           



                                                  18           



                                                  5:04:00           



                                                  CST,           



                                                  Duration:           



                                                  30 day,           



                                                  Stop date:           



                                                  18           



                                                  5:03:00           



                                                  CST, 2.4,           



                                                  m2             

 

     normal      2018      No                       1,000 mL,           [facility]

[facility]



     saline 0.9%      1-08                               Rate: 100           l



     IV 1,000 mL      11:04:                               ml/hr,           [facility]

[facility]



               00                                 Infuse           



                                                  over: 10           



                                                  hr, Route:           



                                                  IV, Dosing           



                                                  Weight           



                                                  131.818           



                                                  kg, Total           



                                                  Volume:           



                                                  1,000,           



                                                  Start           



                                                  date:           



                                                  18           



                                                  5:04:00           



                                                  CST,           



                                                  Duration:           



                                                  30 day,           



                                                  Stop date:           



                                                  18           



                                                  5:03:00           



                                                  CST, 2.4,           



                                                  m2             

 

     Magnesium      2018      No                       Notes:           [facility]

a



     Sulfate      -                               WASTE: F/P           l



               10:36:                               - Sink; E           [place]



                                                -              



                                                  [facility]           



                                                  Trash Bin           

 

     Isolyte S      2018      No                       Notes:           [facility]

a



     PH-7.4      -                               (Same as:           l



     (Bolus) IV      10:36:                               [facility] S           [facility]

[facility]



               00                                 PH 7.4)           

 

     Magnesium      2018      No                       Notes:           [facility]

a



     Sulfate      -                               WASTE: F/P           l



               10:36:                               - Sink; E           [facility]



                                                -              



                                                  [facility]unicip[facility]           



                                                  Trash Bin           

 

     Is[facility] S      2018      No                       Notes:           [facility]

a



     PH-7.4      -                               (Same as:           l



     (Bolus) IV      10:36:                               [facility] S           [facility]

rm[facility]



               00                                 PH 7.4)           

 

     Magnesium      2018      No                       Notes:           [facility]

a



     Sulfate      1-08                               WASTE: F/P           l



               10:36:                               - Sink; E           [facility]



                                                -              



                                                  Municipal           



                                                  Trash Bin           

 

     [facility] S      2018      No                       Notes:           [facility]

a



     PH-7.4      1-08                               (Same as:           l



     (Bolus) IV      10:36:                               [facility] S           [facility]

rm[facility]



               00                                 PH 7.4)           

 

     Magnesium      2018      No                       Notes:           [facility]

a



     Sulfate      1-08                               WASTE: F/P           l



               10:36:                               - Sink; E           [facility]



                                                -              



                                                  Municipal           



                                                  Trash Bin           

 

     [facility] S      2018      No                       Notes:           [facility]

a



     PH-7.4      1-08                               (Same as:           l



     (Bolus) IV      10:36:                               [facility] S           [facility]

[facility]



               00                                 PH 7.4)           

 

     Magnesium      2018      No                       Notes:           [facility]

a



     Sulfate      1-08                               WASTE: F/P           l



               10:36:                               - Sink; E           [facility]



                                                -              



                                                  [facility]unicipal           



                                                  Trash Bin           

 

     [facility]te S      2018      No                       Notes:           [facility]

a



     PH-7.4      1-08                               (Same as:           l



     (Bolus) IV      10:36:                               [facility] S           [facility]

[facility]



               00                                 PH 7.4)           

 

     Magnesium      2018      No                       Notes:           [facility]

a



     Sulfate      1-08                               WASTE: F/P           l



               10:36:                               - Sink; E           [facility]



                                                -              



                                                  Municipal           



                                                  Trash Bin           

 

     [facility] S      2018      No                       Notes:           [facility]

a



     PH-7.4      1-08                               (Same as:           l



     (Bolus) IV      10:36:                               [facility] S           [facility]

[facility]



               00                                 PH 7.4)           

 

     Phenergan      2018      No                       12.5 mg,           [facility]

[facility]



               1-08                               0.5 mL,           l



               10:35:                               Route:           [facility]



               00                                 IVPB, Drug           



                                                  form: INJ,           



                                                  ONCE,           



                                                  Dosing           



                                                  Weight           



                                                  131.818,           



                                                  kg,            



                                                  Priority:           



                                                  STAT,           



                                                  Start           



                                                  date:           



                                                  18           



                                                  4:35:00           



                                                  CST, Stop           



                                                  date:           



                                                  18           



                                                  4:35:00           



                                                  CST            

 

     Phenergan      2018      No                       12.5 mg,           [facility]

[facility]



               1-08                               0.5 mL,           l



               10:35:                               Route:           [facility]



               00                                 IVPB, Drug           



                                                  form: INJ,           



                                                  ONCE,           



                                                  Dosing           



                                                  Weight           



                                                  131.818,           



                                                  kg,            



                                                  Priority:           



                                                  STAT,           



                                                  Start           



                                                  date:           



                                                  18           



                                                  4:35:00           



                                                  CST, Stop           



                                                  date:           



                                                  18           



                                                  4:35:00           



                                                  CST            

 

     Phenergan      2018-1      No                       12.5 mg,           [facility]

[facility]



               1-08                               0.5 mL,           l



               10:35:                               Route:           [facility]



               00                                 IVPB, Drug           



                                                  form: INJ,           



                                                  ONCE,           



                                                  Dosing           



                                                  Weight           



                                                  131.818,           



                                                  kg,            



                                                  Priority:           



                                                  STAT,           



                                                  Start           



                                                  date:           



                                                  18           



                                                  4:35:00           



                                                  CST, Stop           



                                                  date:           



                                                  18           



                                                  4:35:00           



                                                  CST            

 

     Phenergan      2018-1      No                       12.5 mg,           [facility]

[facility]



               1-08                               0.5 mL,           l



               10:35:                               Route:           [facility]



               00                                 IVPB, Drug           



                                                  form: INJ,           



                                                  ONCE,           



                                                  Dosing           



                                                  Weight           



                                                  131.818,           



                                                  kg,            



                                                  Priority:           



                                                  STAT,           



                                                  Start           



                                                  date:           



                                                  18           



                                                  4:35:00           



                                                  CST, Stop           



                                                  date:           



                                                  18           



                                                  4:35:00           



                                                  CST            

 

     Phenergan      2018-1      No                       12.5 mg,           [facility]

[facility]



               1-08                               0.5 mL,           l



               10:35:                               Route:           [facility]



                                                IVPB, Drug           



                                                  form: INJ,           



                                                  ONCE,           



                                                  Dosing           



                                                  Weight           



                                                  131.818,           



                                                  kg,            



                                                  Priority:           



                                                  STAT,           



                                                  Start           



                                                  date:           



                                                  18           



                                                  4:35:00           



                                                  CST, Stop           



                                                  date:           



                                                  18           



                                                  4:35:00           



                                                  CST            

 

     Phenergan      2018-      No                       12.5 mg,           [facility]

[facility]



               1-08                               0.5 mL,           l



               10:35:                               Route:           [facility]



                                                IVPB, Drug           



                                                  form: INJ,           



                                                  ONCE,           



                                                  Dosing           



                                                  Weight           



                                                  131.818,           



                                                  kg,            



                                                  Priority:           



                                                  STAT,           



                                                  Start           



                                                  date:           



                                                  18           



                                                  4:35:00           



                                                  CST, Stop           



                                                  date:           



                                                  18           



                                                  4:35:00           



                                                  CST            

 

     Wellbutrin Wellbutrin 2018      No             1{table BID  Wellbutrin   

        



      MG  MG 0-04                     t_in_th       MG        

   



               00:00:                     e_morni                     



               00                       ng}                      

 

     Wellbutrin Wellbutrin 2018      No             1{table BID  Wellbutrin   

        



      MG  MG 0-04                     t_in_th       MG        

   



               00:00:                     e_morni                     



               00                       ng}                      

 

     Wellbutrin Wellbutrin 2018      No             1{table BID               

  



      MG  MG 0-04                     t_in_th                     



               00:00:                     e_morni                     



               00                       ng}                      

 

     Wellbutrin Wellbutrin 2018      No             1{table BID  Wellbutrin   

        



      MG  MG 0-04                     t_in_th       MG        

   



               00:00:                     e_morni                     



               00                       ng}                      

 

     Wellbutrin Wellbutrin 2018      No             1{table BID  Wellbutrin   

        



      MG  MG 0-04                     t_in_th       MG        

   



               00:00:                     e_morni                     



               00                       ng}                      

 

     Wellbutrin Wellbutrin 2018      No             1{table BID  Wellbutrin   

        



      MG  MG 0-04                     t_in_th       MG        

   



               00:00:                     e_morni                     



               00                       ng}                      

 

     Wellbutrin Wellbutrin 2018      No             1{table BID  Wellbutrin   

        



      MG  MG 0-04                     t_in_th       MG        

   



               00:00:                     e_morni                     



               00                       ng}                      

 

     Citalopram Citalopram       Yes  [place]                1 tablet     

      Common



     Hydrobromid Hydrobromid 7-16                                              S

pirit



     e    e    00:00:                                              - [facility]



                                                               [facility]

 

     Seroquel Seroquel       Yes  [place]                1 tablet         

  Common



               7-16                                              [facility]



               00:00:                                              - [facility]



                                                               [facility]

 

     Docusate            Yes                      100 mg = 1           [facility]

[facility]



     Sodium 100      5-08                               cap, PO,           l



     MG Oral      14:56:                               BID, 0           [facility]



     Capsule      00                                 Refill(s)           

 

     Zosyn            Yes                      0              [facility]



               5-08                               Refill(s)           l



               14:56:                                              [facility]



               00                                                

 

     celecoxib      0      Yes                      200 mg = 1           [facility]

[facility]



     200 mg oral      5-08                               cap, PO,           l



     capsule      14:56:                               BID, 0           [facility]



               00                                 Refill(s)           

 

     ascorbic            Yes                      500 mg = 1           [facility]

[facility]



     acid      5-08                               tab, PO,           l



               14:56:                               BID, 0           [facility]



               00                                 Refill(s)           

 

     acetaminoph            Yes                      1,000 mg =           

[facility]



     en 500 mg      5-08                               2 tab, PO,           l



     oral tablet      14:56:                               Q6Hnow, 0           [facility]

[facility]



               00                                 Refill(s)           

 

     Oxycodone            Yes                      5 mg = 1           [facility]

[facility]



     Hydrochlori      5-08                               tab, PO,           l



     de 5 MG      14:56:                               Q4H, PRN           [facility]

n



     Oral Tablet      00                                 Pain Score           



                                                  4-6, 0           



                                                  Refill(s)           

 

     zinc            Yes                      220 mg = 1           [facility]



     sulfate 220      5-08                               cap, PO,           l



     mg oral      14:56:                               Daily, 0           [facility]

n



     capsule      00                                 Refill(s)           

 

     multivitami            Yes                      1 tab, PO,           

[facility]



     n         5-08                               Daily, 0           l



               14:56:                               Refill(s)           [facility]



               00                                                

 

     methocarbam            Yes                      1,000 mg =           

[facility]



     ol 500 mg      5-08                               2 tab, PO,           l



     oral tablet      14:56:                               Q8H, 0           [facility]

[facility]



               00                                 Refill(s)           

 

     LORazepam            Yes                      0.5 mg = 1           [facility]

[facility]



     0.5 mg oral      5-08                               tab, PO,           l



     tablet      14:56:                               Q8H, PRN           [facility]



               00                                 Anxiety, 0           



                                                  Refill(s)           

 

     Lidocaine            Yes                      3 patch,           [facility]

[facility]



     Hydrochlori      5-08                               TOP,           l



     de 0.05      14:56:                               Daily,           [facility]



     MG/MG      00                                 Remove           



     Transdermal                                         after 12           



     Patch                                         hours, 0           



     [Lidoderm]                                         Refill(s)           

 

     Docusate            Yes                      100 mg = 1           [facility]

[facility]



     Sodium 100      5-08                               cap, PO,           l



     MG Oral      14:56:                               BID, 0           [facility]



     Capsule      00                                 Refill(s)           

 

     Zosyn            Yes                      0              [facility]



               5-08                               Refill(s)           l



               14:56:                                              [facility]



               00                                                

 

     celecoxib            Yes                      200 mg = 1           [facility]

[facility]



     200 mg oral      5-08                               cap, PO,           l



     capsule      14:56:                               BID, 0           [facility]



               00                                 Refill(s)           

 

     ascorbic            Yes                      500 mg = 1           [facility]

[facility]



     acid      5-08                               tab, PO,           l



               14:56:                               BID, 0           [facility]



               00                                 Refill(s)           

 

     acetaminoph            Yes                      1,000 mg =           

[facility]



     en 500 mg      5-08                               2 tab, PO,           l



     oral tablet      14:56:                               Q6Hnow, 0           [facility]

[facility]



               00                                 Refill(s)           

 

     Oxycodone            Yes                      5 mg = 1           [facility]

[facility]



     Hydrochlori      5-08                               tab, PO,           l



     de 5 MG      14:56:                               Q4H, PRN           [facility]

n



     Oral Tablet      00                                 Pain Score           



                                                  4-6, 0           



                                                  Refill(s)           

 

     zinc            Yes                      220 mg = 1           [facility]



     sulfate 220      5-08                               cap, PO,           l



     mg oral      14:56:                               Daily, 0           [facility]

n



     capsule      00                                 Refill(s)           

 

     multivitami            Yes                      1 tab, PO,           

[facility]



     n         5-08                               Daily, 0           l



               14:56:                               Refill(s)           [facility]



               00                                                

 

     methocarbam            Yes                      1,000 mg =           

[facility]



     ol 500 mg      5-08                               2 tab, PO,           l



     oral tablet      14:56:                               Q8H, 0           [facility]

[facility]



               00                                 Refill(s)           

 

     LORazepam            Yes                      0.5 mg = 1           [facility]

[facility]



     0.5 mg oral      5-08                               tab, PO,           l



     tablet      14:56:                               Q8H, PRN           [facility]



               00                                 Anxiety, 0           



                                                  Refill(s)           

 

     Lidocaine            Yes                      3 patch,           [facility]

[facility]



     Hydrochlori      5-08                               TOP,           l



     de 0.05      14:56:                               Daily,           [facility]



     MG/MG      00                                 Remove           



     Transdermal                                         after 12           



     Patch                                         hours, 0           



     [Lidoderm]                                         Refill(s)           

 

     Docusate            Yes                      100 mg = 1           [facility]

[facility]



     Sodium 100      5-08                               cap, PO,           l



     MG Oral      14:56:                               BID, 0           [facility]



     Capsule      00                                 Refill(s)           

 

     Zosyn            Yes                      0              [facility]



               5-08                               Refill(s)           l



               14:56:                                              [facility]



               00                                                

 

     celecoxib            Yes                      200 mg = 1           [facility]

[facility]



     200 mg oral      5-08                               cap, PO,           l



     capsule      14:56:                               BID, 0           [facility]



               00                                 Refill(s)           

 

     ascorbic            Yes                      500 mg = 1           [facility]

[facility]



     acid      5-08                               tab, PO,           l



               14:56:                               BID, 0           [facility]



               00                                 Refill(s)           

 

     acetaminoph            Yes                      1,000 mg =           

[facility]



     en 500 mg      5-08                               2 tab, PO,           l



     oral tablet      14:56:                               Q6Hnow, 0           [facility]

[facility]



               00                                 Refill(s)           

 

     Oxycodone            Yes                      5 mg = 1           [facility]

[facility]



     Hydrochlori      5-08                               tab, PO,           l



     de 5 MG      14:56:                               Q4H, PRN           [facility]

n



     Oral Tablet      00                                 Pain Score           



                                                  4-6, 0           



                                                  Refill(s)           

 

     zinc            Yes                      220 mg = 1           [facility]



     sulfate 220      5-08                               cap, PO,           l



     mg oral      14:56:                               Daily, 0           [facility]

n



     capsule      00                                 Refill(s)           

 

     multivitami      0      Yes                      1 tab, PO,           

[facility]



     n         5-08                               Daily, 0           l



               14:56:                               Refill(s)           [facility]



               00                                                

 

     methocarbam            Yes                      1,000 mg =           

[facility]



     ol 500 mg      5-08                               2 tab, PO,           l



     oral tablet      14:56:                               Q8H, 0           [facility]

[facility]



               00                                 Refill(s)           

 

     LORazepam            Yes                      0.5 mg = 1           [facility]

[facility]



     0.5 mg oral      5-08                               tab, PO,           l



     tablet      14:56:                               Q8H, PRN           [facility]



               00                                 Anxiety, 0           



                                                  Refill(s)           

 

     Lidocaine            Yes                      3 patch,           [facility]

[facility]



     Hydrochlori      5-08                               TOP,           l



     de 0.05      14:56:                               Daily,           [facility]



     MG/MG      00                                 Remove           



     Transdermal                                         after 12           



     Patch                                         hours, 0           



     [Lidoderm]                                         Refill(s)           

 

     Docusate            Yes                      100 mg = 1           [facility]

[facility]



     Sodium 100      5-08                               cap, PO,           l



     MG Oral      14:56:                               BID, 0           [facility]



     Capsule      00                                 Refill(s)           

 

     Zosyn            Yes                      0              [facility]



               5-08                               Refill(s)           l



               14:56:                                              [facility]



               00                                                

 

     celecoxib            Yes                      200 mg = 1           [facility]

[facility]



     200 mg oral      5-08                               cap, PO,           l



     capsule      14:56:                               BID, 0           [facility]



               00                                 Refill(s)           

 

     ascorbic            Yes                      500 mg = 1           [facility]

[facility]



     acid      5-08                               tab, PO,           l



               14:56:                               BID, 0           [facility]



               00                                 Refill(s)           

 

     acetaminoph            Yes                      1,000 mg =           

[facility]



     en 500 mg      5-08                               2 tab, PO,           l



     oral tablet      14:56:                               Q6Hnow, 0           [facility]

[facility]



               00                                 Refill(s)           

 

     Oxycodone            Yes                      5 mg = 1           [facility]

[facility]



     Hydrochlori      5-08                               tab, PO,           l



     de 5 MG      14:56:                               Q4H, PRN           [facility]

n



     Oral Tablet      00                                 Pain Score           



                                                  4-6, 0           



                                                  Refill(s)           

 

     zinc            Yes                      220 mg = 1           [facility]



     sulfate 220      5-08                               cap, PO,           l



     mg oral      14:56:                               Daily, 0           [facility]

n



     capsule      00                                 Refill(s)           

 

     multivitami            Yes                      1 tab, PO,           

[facility]



     n         5-08                               Daily, 0           l



               14:56:                               Refill(s)           [facility]



               00                                                

 

     methocarbam            Yes                      1,000 mg =           

[facility]



     ol 500 mg      5-08                               2 tab, PO,           l



     oral tablet      14:56:                               Q8H, 0           [facility]

[facility]



               00                                 Refill(s)           

 

     LORazepam      2018-0      Yes                      0.5 mg = 1           [facility]

[facility]



     0.5 mg oral      5-08                               tab, PO,           l



     tablet      14:56:                               Q8H, PRN           [facility]



               00                                 Anxiety, 0           



                                                  Refill(s)           

 

     Lidocaine      0      Yes                      3 patch,           [facility]

[facility]



     Hydrochlori      5-08                               TOP,           l



     de 0.05      14:56:                               Daily,           [facility]



     MG/MG      00                                 Remove           



     Transdermal                                         after 12           



     Patch                                         hours, 0           



     [Lidoderm]                                         Refill(s)           

 

     Docusate            Yes                      100 mg = 1           [facility]

[facility]



     Sodium 100      5-08                               cap, PO,           l



     MG Oral      14:56:                               BID, 0           [facility]



     Capsule      00                                 Refill(s)           

 

     Zosyn            Yes                      0              [facility]



               5-08                               Refill(s)           l



               14:56:                                              [facility]



               00                                                

 

     celecoxib            Yes                      200 mg = 1           [facility]

[facility]



     200 mg oral      5-08                               cap, PO,           l



     capsule      14:56:                               BID, 0           [facility]



               00                                 Refill(s)           

 

     ascorbic            Yes                      500 mg = 1           [facility]

[facility]



     acid      5-08                               tab, PO,           l



               14:56:                               BID, 0           [facility]



               00                                 Refill(s)           

 

     acetaminoph            Yes                      1,000 mg =           

[facility]



     en 500 mg      5-08                               2 tab, PO,           l



     oral tablet      14:56:                               Q6Hnow, 0           [facility]

[facility]



               00                                 Refill(s)           

 

     Oxycodone            Yes                      5 mg = 1           [facility]

[facility]



     Hydrochlori      5-08                               tab, PO,           l



     de 5 MG      14:56:                               Q4H, PRN           [facility]

n



     Oral Tablet      00                                 Pain Score           



                                                  4-6, 0           



                                                  Refill(s)           

 

     zinc            Yes                      220 mg = 1           [facility]



     sulfate 220      5-08                               cap, PO,           l



     mg oral      14:56:                               Daily, 0           [facility]

n



     capsule      00                                 Refill(s)           

 

     multivitami      0      Yes                      1 tab, PO,           

[facility]



     n         5-08                               Daily, 0           l



               14:56:                               Refill(s)           [facility]



               00                                                

 

     methocarbam            Yes                      1,000 mg =           

[facility]



     ol 500 mg      5-08                               2 tab, PO,           l



     oral tablet      14:56:                               Q8H, 0           [facility]

[facility]



               00                                 Refill(s)           

 

     LORazepam            Yes                      0.5 mg = 1           [facility]

[facility]



     0.5 mg oral      5-08                               tab, PO,           l



     tablet      14:56:                               Q8H, PRN           [facility]



               00                                 Anxiety, 0           



                                                  Refill(s)           

 

     Lidocaine            Yes                      3 patch,           [facility]

[facility]



     Hydrochlori      5-08                               TOP,           l



     de 0.05      14:56:                               Daily,           [facility]



     MG/MG      00                                 Remove           



     Transdermal                                         after 12           



     Patch                                         hours, 0           



     [Lidoderm]                                         Refill(s)           

 

     Docusate            Yes                      100 mg = 1           [facility]

[facility]



     Sodium 100      5-08                               cap, PO,           l



     MG Oral      14:56:                               BID, 0           [facility]



     Capsule      00                                 Refill(s)           

 

     Zosyn            Yes                      0              [facility]



               5-08                               Refill(s)           l



               14:56:                                              [facility]



               00                                                

 

     celecoxib            Yes                      200 mg = 1           [facility]

[facility]



     200 mg oral      5-08                               cap, PO,           l



     capsule      14:56:                               BID, 0           [facility]



               00                                 Refill(s)           

 

     ascorbic            Yes                      500 mg = 1           [facility]

[facility]



     acid      5-08                               tab, PO,           l



               14:56:                               BID, 0           [facility]



               00                                 Refill(s)           

 

     acetaminoph            Yes                      1,000 mg =           

[facility]



     en 500 mg      5-08                               2 tab, PO,           l



     oral tablet      14:56:                               Q6Hnow, 0           [facility]

[facility]



               00                                 Refill(s)           

 

     Oxycodone            Yes                      5 mg = 1           [facility]

[facility]



     Hydrochlori      5-08                               tab, PO,           l



     de 5 MG      14:56:                               Q4H, PRN           [facility]

n



     Oral Tablet      00                                 Pain Score           



                                                  4-6, 0           



                                                  Refill(s)           

 

     zinc            Yes                      220 mg = 1           [facility]



     sulfate 220      5-08                               cap, PO,           l



     mg oral      14:56:                               Daily, 0           [facility]

n



     capsule      00                                 Refill(s)           

 

     multivitami            Yes                      1 tab, PO,           

[facility]



     n         5-08                               Daily, 0           l



               14:56:                               Refill(s)           [facility]



                                                               

 

     methocarbam            Yes                      1,000 mg =           

[facility]



     ol 500 mg      5-08                               2 tab, PO,           l



     oral tablet      14:56:                               Q8H, 0           [facility]

[facility]



                                                Refill(s)           

 

     LORazepam            Yes                      0.5 mg = 1           [facility]

[facility]



     0.5 mg oral      5-08                               tab, PO,           l



     tablet      14:56:                               Q8H, PRN           [facility]



               00                                 Anxiety, 0           



                                                  Refill(s)           

 

     Lidocaine            Yes                      3 patch,           [facility]

[facility]



     Hydrochlori      5-08                               TOP,           l



     de 0.05      14:56:                               Daily,           [facility]



     MG/MG      00                                 Remove           



     Transdermal                                         after 12           



     Patch                                         hours, 0           



     [Lidoderm]                                         Refill(s)           

 

     Robaxin            No                       Notes:           [facility]



               5-06                               (Same           l



               21:00:                               as:Robaxin           [facility]



               00                                 )              

 

     Robaxin            No                       Notes:           [facility]



               5-06                               (Same           l



               21:00:                               as:Robaxin           [facility]



               00                                 )              

 

     Robaxin            No                       Notes:           [facility]



               5-06                               (Same           l



               21:00:                               as:Robaxin           [facility]



               00                                 )              

 

     Robaxin            No                       Notes:           [facility]



               5-06                               (Same           l



               21:00:                               as:Robaxin           [facility]



               00                                 )              

 

     Robaxin            No                       Notes:           [facility]



               5-06                               (Same           l



               21:00:                               as:Robaxin           [facility]



                                                )              

 

     Robaxin            No                       Notes:           [facility]



               5-06                               (Same           l



               21:00:                               as:Robaxin           [facility]



               00                                 )              

 

     Oxycodone            No                       Notes:           [facility]

a



     Hydrochlori      5-06                               (Same as:           l



     de 5 MG      18:06:                               Roxicodone           [facility]

[facility]



     Oral Tablet                                       )              

 

     Oxycodone            No                       Notes:           [facility]

a



     Hydrochlori      5-06                               (Same as:           l



     de 5 MG      18:06:                               Roxicodone           [facility]

[facility]



     Oral Tablet      00                                 )              

 

     Oxycodone            No                       Notes:           [facility]

a



     Hydrochlori      5-06                               (Same as:           l



     de 5 MG      18:06:                               Roxicodone           [facility]

[facility]



     Oral Tablet      00                                 )              

 

     Oxycodone            No                       Notes:           [facility]

a



     Hydrochlori      5-06                               (Same as:           l



     de 5 MG      18:06:                               Roxicodone           [facility]

[facility]



     Oral Tablet      00                                 )              

 

     Oxycodone            No                       Notes:           [facility]

a



     Hydrochlori      5-06                               (Same as:           l



     de 5 MG      18:06:                               Roxicodone           [facility]

[facility]



     Oral Tablet      00                                 )              

 

     Oxycodone            No                       Notes:           [facility]

a



     Hydrochlori      5-06                               (Same as:           l



     de 5 MG      18:06:                               Roxicodone           [facility]

[facility]



     Oral Tablet      00                                 )              

 

     Ativan            No                       Notes:           [facility]



               5-06                               (Same as:           l



               15:18:                               Ativan)           [facility]



                                                               

 

     Ativan            No                       Notes:           [facility]



               5-06                               (Same as:           l



               15:18:                               Ativan)           [facility]



                                                               

 

     Ativan            No                       Notes:           [facility]



               5-06                               (Same as:           l



               15:18:                               Ativan)           [facility]



               00                                                

 

     Ativan            No                       Notes:           [facility]



               5-06                               (Same as:           l



               15:18:                               Ativan)           [facility]



               00                                                

 

     Ativan            No                       Notes:           [facility]



               5-06                               (Same as:           l



               15:18:                               Ativan)           [facility]



                                                               

 

     Ativan            No                       Notes:           [facility]



               5-06                               (Same as:           l



               15:18:                               Ativan)           [facility]



                                                               

 

     Trazodone            No                       Notes:           [facility]

a



     Hydrochlori      5-06                               (Same As:           l



     de 50 MG      02:00:                               Desyrel)           [facility]

[facility]



     Oral Tablet      00                                                

 

     remove            No                       Notes:           [facility]



     patch      5-06                               Remove           l



               02:00:                               patch 12           [facility]



               00                                 hours           



                                                  after           



                                                  applicatio           



                                                  n each           



                                                  day.           

 

     Trazodone            No                       Notes:           [facility]

a



     Hydrochlori      5-06                               (Same As:           l



     de 50 MG      02:00:                               Desyrel)           [facility]

nn



     Oral Tablet      00                                                

 

     remove            No                       Notes:           [facility]



     patch      5-06                               Remove           l



               02:00:                               patch 12           [facility]



               00                                 hours           



                                                  after           



                                                  applicatio           



                                                  n each           



                                                  day.           

 

     Trazodone            No                       Notes:           [facility]

a



     Hydrochlori      5-06                               (Same As:           l



     de 50 MG      02:00:                               Desyrel)           [facility]

[facility]



     Oral Tablet      00                                                

 

     remove            No                       Notes:           [facility]



     patch      5-06                               Remove           l



               02:00:                               patch 12           [facility]



               00                                 hours           



                                                  after           



                                                  applicatio           



                                                  n each           



                                                  day.           

 

     Trazodone            No                       Notes:           [facility]

a



     Hydrochlori      5-06                               (Same As:           l



     de 50 MG      02:00:                               Desyrel)           [facility]

[facility]



     Oral Tablet      00                                                

 

     remove            No                       Notes:           [facility]



     patch      5-06                               Remove           l



               02:00:                               patch 12           [facility]



               00                                 hours           



                                                  after           



                                                  applicatio           



                                                  n each           



                                                  day.           

 

     Trazodone            No                       Notes:           [facility]

a



     Hydrochlori      5-06                               (Same As:           l



     de 50 MG      02:00:                               Desyrel)           [facility]

[facility]



     Oral Tablet      00                                                

 

     remove            No                       Notes:           [facility]



     patch      5-06                               Remove           l



               02:00:                               patch 12           [facility]



               00                                 hours           



                                                  after           



                                                  applicatio           



                                                  n each           



                                                  day.           

 

     Trazodone            No                       Notes:           [facility]

a



     Hydrochlori      5-06                               (Same As:           l



     de 50 MG      02:00:                               Desyrel)           [facility]

nn



     Oral Tablet      00                                                

 

     remove            No                       Notes:           [facility]



     patch      5-06                               Remove           l



               02:00:                               patch 12           [facility]



               00                                 hours           



                                                  after           



                                                  applicatio           



                                                  n each           



                                                  day.           

 

     Oxycodone            No                       Notes:           [facility]

a



     Hydrochlori      5-05                               (Same as:           l



     de 5 MG      22:01:                               Roxicodone           [facility]

[facility]



     Oral Tablet      00                                 )              

 

     Oxycodone            No                       Notes:           [facility]

a



     Hydrochlori      5-05                               (Same as:           l



     de 5 MG      22:01:                               Roxicodone           [facility]

[facility]



     Oral Tablet      00                                 )              

 

     Oxycodone            No                       Notes:           [facility]

a



     Hydrochlori      5-05                               (Same as:           l



     de 5 MG      22:01:                               Roxicodone           [facility]

[facility]



     Oral Tablet                                       )              

 

     Oxycodone            No                       Notes:           [facility]

a



     Hydrochlori      5-05                               (Same as:           l



     de 5 MG      22:01:                               Roxicodone           [facility]

[facility]



     Oral Tablet      00                                 )              

 

     Oxycodone            No                       Notes:           [facility]

a



     Hydrochlori      5-05                               (Same as:           l



     de 5 MG      22:01:                               Roxicodone           [facility]

[facility]



     Oral Tablet                                       )              

 

     Oxycodone            No                       Notes:           [facility]

a



     Hydrochlori      5-05                               (Same as:           l



     de 5 MG      22:01:                               Roxicodone           [facility]

[facility]



     Oral Tablet                                       )              

 

     Celebrex            No                       Notes:           [facility]



               5-05                               NSAID.           l



               22:00:                               Please           [facility]



               00                                 check           



                                                  indication           



                                                  . Not for           



                                                  seizure.           



                                                  (Same As:           



                                                  CeleBREX)           

 

     Celebrex            No                       Notes:           [facility]



               5-05                               NSAID.           l



               22:00:                               Please           [facility]



               00                                 check           



                                                  indication           



                                                  . Not for           



                                                  seizure.           



                                                  (Same As:           



                                                  CeleBREX)           

 

     Celebrex            No                       Notes:           [facility]



               5-05                               NSAID.           l



               22:00:                               Please           [facility]



               00                                 check           



                                                  indication           



                                                  . Not for           



                                                  seizure.           



                                                  (Same As:           



                                                  CeleBREX)           

 

     Celebrex            No                       Notes:           [facility]



               5-05                               NSAID.           l



               22:00:                               Please           [facility]



               00                                 check           



                                                  indication           



                                                  . Not for           



                                                  seizure.           



                                                  (Same As:           



                                                  CeleBREX)           

 

     Celebrex            No                       Notes:           [facility]



               5-05                               NSAID.           l



               22:00:                               Please           [facility]



               00                                 check           



                                                  indication           



                                                  . Not for           



                                                  seizure.           



                                                  (Same As:           



                                                  CeleBREX)           

 

     Celebrex            No                       Notes:           [facility]



               5-05                               NSAID.           l



               22:00:                               Please           [facility]



               00                                 check           



                                                  indication           



                                                  . Not for           



                                                  seizure.           



                                                  (Same As:           



                                                  CeleBREX)           

 

     Vancomycin      2018-0      No                        2001 mg:           [facility]

[facility]



               5-05                               infuse           l



               21:00:                               over 2.5           [facility]



               00                                 hours           

 

     Vancomycin      2018-0      No                        2001 mg:           [facility]

[facility]



               5-05                               infuse           l



               21:00:                               over 2.5           [facility]



               00                                 hours           

 

     Vancomycin      2018-0      No                        2001 mg:           [facility]

[facility]



               5-05                               infuse           l



               21:00:                               over 2.5           [facility]



               00                                 hours           

 

     Vancomycin      2018-0      No                        2001 mg:           [facility]

[facility]



               5-05                               infuse           l



               21:00:                               over 2.5           [facility]



               00                                 hours           

 

     Vancomycin      2018-0      No                        2001 mg:           [facility]

[facility]



               5-05                               infuse           l



               21:00:                               over 2.5           [place]



               00                                 hours           

 

     Vancomycin      2018-0      No                        2001 mg:           [facility]

[facility]



               5-05                               infuse           l



               21:00:                               over 2.5           [facility]



               00                                 hours           

 

     Lidocaine      2018-0      No                       Notes:           [facility]

a



     Hydrochlori      5-05                               Apply only           l



     de 0.05      14:00:                               once for           [facility]

n



     MG/MG      00                                 up to 12           



     Transdermal                                         hours in a           



     Patch                                         24-hour           



     [Lidoderm]                                         period (12           



                                                  hours on           



                                                  and 12           



                                                  hours           



                                                  off).           



                                                  (Same as:           



                                                  Lidoderm)           



                                                  "Remove           



                                                  old patch           



                                                  before           



                                                  applicatio           



                                                  n of new           



                                                  patch"           

 

     Lidocaine      -0      No                       Notes:           [facility]

a



     Hydrochlori      5-05                               Apply only           l



     de 0.05      14:00:                               once for           [facility]

n



     MG/MG      00                                 up to 12           



     Transdermal                                         hours in a           



     Patch                                         24-hour           



     [Lidoderm]                                         period (12           



                                                  hours on           



                                                  and 12           



                                                  hours           



                                                  off).           



                                                  (Same as:           



                                                  Lidoderm)           



                                                  "Remove           



                                                  old patch           



                                                  before           



                                                  applicatio           



                                                  n of new           



                                                  patch"           

 

     Lidocaine            No                       Notes:           [facility]

a



     Hydrochlori      5-05                               Apply only           l



     de 0.05      14:00:                               once for           [facility]

n



     MG/MG      00                                 up to 12           



     Transdermal                                         hours in a           



     Patch                                         24-hour           



     [Lidoderm]                                         period (12           



                                                  hours on           



                                                  and 12           



                                                  hours           



                                                  off).           



                                                  (Same as:           



                                                  Lidoderm)           



                                                  "Remove           



                                                  old patch           



                                                  before           



                                                  applicatio           



                                                  n of new           



                                                  patch"           

 

     Lidocaine      -0      No                       Notes:           [facility]

a



     Hydrochlori      5-05                               Apply only           l



     de 0.05      14:00:                               once for           [facility]

n



     MG/MG      00                                 up to 12           



     Transdermal                                         hours in a           



     Patch                                         24-hour           



     [Lidoderm]                                         period (12           



                                                  hours on           



                                                  and 12           



                                                  hours           



                                                  off).           



                                                  (Same as:           



                                                  Lidoderm)           



                                                  "Remove           



                                                  old patch           



                                                  before           



                                                  applicatio           



                                                  n of new           



                                                  patch"           

 

     Lidocaine            No                       Notes:           [facility]

a



     Hydrochlori      5-05                               Apply only           l



     de 0.05      14:00:                               once for           [facility]

n



     MG/MG      00                                 up to 12           



     Transdermal                                         hours in a           



     Patch                                         24-hour           



     [Lidoderm]                                         period (12           



                                                  hours on           



                                                  and 12           



                                                  hours           



                                                  off).           



                                                  (Same as:           



                                                  Lidoderm)           



                                                  "Remove           



                                                  old patch           



                                                  before           



                                                  applicatio           



                                                  n of new           



                                                  patch"           

 

     Lidocaine            No                       Notes:           [facility]

a



     Hydrochlori      5-05                               Apply only           l



     de 0.05      14:00:                               once for           [facility]

n



     MG/MG      00                                 up to 12           



     Transdermal                                         hours in a           



     Patch                                         24-hour           



     [Lidoderm]                                         period (12           



                                                  hours on           



                                                  and 12           



                                                  hours           



                                                  off).           



                                                  (Same as:           



                                                  Lidoderm)           



                                                  "Remove           



                                                  old patch           



                                                  before           



                                                  applicatio           



                                                  n of new           



                                                  patch"           

 

     Phenergan            No                       Notes: Do           [facility]

[facility]



               5-04                               not give           l



               22:40:                               IV push.           [facility]



               00                                 (Same as:           



                                                  Phenergan)           

 

     Dilaudid            No                       Notes:           [facility]



               5-04                               Same as           l



               22:40:                               Dilaudid           [facility]



               00                                                

 

     Phenergan            No                       Notes: Do           [facility]

[facility]



               5-04                               not give           l



               22:40:                               IV push.           [place]



               00                                 (Same as:           



                                                  Phenergan)           

 

     Dilaudid            No                       Notes:           [facility]



               5-04                               Same as           l



               22:40:                               Dilaudid           [facility]



               00                                                

 

     Phenergan            No                       Notes: Do           [facility]

[facility]



               5-04                               not give           l



               22:40:                               IV push.           [place]



               00                                 (Same as:           



                                                  Phenergan)           

 

     Dilaudid            No                       Notes:           [facility]



               5-04                               Same as           l



               22:40:                               Dilaudid           [facility]



               00                                                

 

     Phenergan            No                       Notes: Do           [facility]

[facility]



               5-04                               not give           l



               22:40:                               IV push.           [place]



               00                                 (Same as:           



                                                  Phenergan)           

 

     Dilaudid            No                       Notes:           [facility]



               5-04                               Same as           l



               22:40:                               Dilaudid           [facility]



               00                                                

 

     Phenergan            No                       Notes: Do           [facility]

[facility]



               5-04                               not give           l



               22:40:                               IV push.           [facility]



               00                                 (Same as:           



                                                  Phenergan)           

 

     Dilaudid            No                       Notes:           [facility]



               5-04                               Same as           l



               22:40:                               Dilaudid           [place]



               00                                                

 

     Phenergan            No                       Notes: Do           [facility]

[facility]



               5-04                               not give           l



               22:40:                               IV push.           [place]                                 (Same as:           



                                                  Phenergan)           

 

     Dilaudid            No                       Notes:           [facility]



               5-04                               Same as           l



               22:40:                               Dilaudid                                                           

 

     Tramadol            No                       Notes: Not           [facility]

[facility]



               5-04                               to exceed           l



               22:00:                               400mg/day.           [place]



               00                                 (Same As:           



                                                  Ultram)           

 

     gabapentin            No                       Notes:           [facility]

[facility]



               5-04                               (Same as:           l



               22:00:                               Neurontin)           [facility]                                                

 

     Acetaminoph            No                       Notes: Max           

[facility]



     en        5-04                               acetaminop           l



               22:00:                               hen 4000           [facility]



               00                                 mg/day (4           



                                                  gm/day).           



                                                  (Same as:           



                                                  Tylenol           



                                                  Extra           



                                                  Strength)           

 

     Robaxin            No                       Notes:           [facility]



               5-04                               (Same           l



               22:00:                               as:Robaxin           [place]                                 )              

 

     Tramadol            No                       Notes: Not           [facility]

[facility]



               5-04                               to exceed           l



               22:00:                               400mg/day.           [place]                                 (Same As:           



                                                  Ultram)           

 

     gabapentin            No                       Notes:           [facility]

[facility]



               5-04                               (Same as:           l



               22:00:                               Neurontin)           [facility]                                                

 

     Acetaminoph            No                       Notes: Max           

[facility]



     en        5-04                               acetaminop           l



               22:00:                               hen 4000           [place]



               00                                 mg/day (4           



                                                  gm/day).           



                                                  (Same as:           



                                                  Tylenol           



                                                  Extra           



                                                  Strength)           

 

     Robaxin            No                       Notes:           [facility]



               5-04                               (Same           l



               22:00:                               as:Robaxin           [facility]                                 )              

 

     Tramadol            No                       Notes: Not           [facility]

[facility]



               5-04                               to exceed           l



               22:00:                               400mg/day.           [facility]



               00                                 (Same As:           



                                                  Ultram)           

 

     gabapentin            No                       Notes:           [facility]

[facility]



               5-04                               (Same as:           l



               22:00:                               Neurontin)           [facility]                                                

 

     Acetaminoph            No                       Notes: Max           

[facility]



     en        5-04                               acetaminop           l



               22:00:                               hen 4000           [place]



               00                                 mg/day (4           



                                                  gm/day).           



                                                  (Same as:           



                                                  Tylenol           



                                                  Extra           



                                                  Strength)           

 

     Robaxin            No                       Notes:           [facility]



               5-04                               (Same           l



               22:00:                               as:Robaxin           [place]



                                                )              

 

     Tramadol            No                       Notes: Not           [facility]

[facility]



               5-04                               to exceed           l



               22:00:                               400mg/day.           [place]



               00                                 (Same As:           



                                                  Ultram)           

 

     gabapentin            No                       Notes:           [facility]

[facility]



               5-04                               (Same as:           l



               22:00:                               Neurontin)           [facility]



               00                                                

 

     Acetaminoph            No                       Notes: Max           

[facility]



     en        5-04                               acetaminop           l



               22:00:                               hen 4000           [facility]



               00                                 mg/day (4           



                                                  gm/day).           



                                                  (Same as:           



                                                  Tylenol           



                                                  Extra           



                                                  Strength)           

 

     Robaxin            No                       Notes:           [facility]



               5-04                               (Same           l



               22:00:                               as:Robaxin           [facility]



               00                                 )              

 

     Tramadol            No                       Notes: Not           [facility]

[facility]



               5-04                               to exceed           l



               22:00:                               400mg/day.           [facility]



               00                                 (Same As:           



                                                  Ultram)           

 

     gabapentin            No                       Notes:           [facility]

[facility]



               5-04                               (Same as:           l



               22:00:                               Neurontin)           [facility]



               00                                                

 

     Acetaminoph            No                       Notes: Max           

[facility]



     en        5-04                               acetaminop           l



               22:00:                               hen 4000           [place]



               00                                 mg/day (4           



                                                  gm/day).           



                                                  (Same as:           



                                                  Tylenol           



                                                  Extra           



                                                  Strength)           

 

     Robaxin            No                       Notes:           [facility]



               5-04                               (Same           l



               22:00:                               as:Robaxin           [facility]



               00                                 )              

 

     Tramadol            No                       Notes: Not           [facility]

[facility]



               5-04                               to exceed           l



               22:00:                               400mg/day.           [place]



               00                                 (Same As:           



                                                  Ultram)           

 

     gabapentin            No                       Notes:           [facility]

[facility]



               5-04                               (Same as:           l



               22:00:                               Neurontin)           [place]



               00                                                

 

     Acetaminoph            No                       Notes: Max           

[facility]



     en        5-04                               acetaminop           l



               22:00:                               hen 4000           [place]



               00                                 mg/day (4           



                                                  gm/day).           



                                                  (Same as:           



                                                  Tylenol           



                                                  Extra           



                                                  Strength)           

 

     Robaxin            No                       Notes:           [facility]



               5-04                               (Same           l



               22:00:                               as:Robaxin           [facility]



               00                                 )              

 

     Oxycodone            No                       Notes:           [facility]

a



     Hydrochlori      5-04                               (Same as:           l



     de 5 MG      21:33:                               Roxicodone           [facility]

[facility]



     Oral Tablet      00                                 )              

 

     Oxycodone            No                       Notes:           [facility]

a



     Hydrochlori      5-04                               (Same as:           l



     de 5 MG      21:33:                               Roxicodone           [facility]

[facility]



     Oral Tablet      00                                 )              

 

     Oxycodone            No                       Notes:           [facility]

a



     Hydrochlori      5-04                               (Same as:           l



     de 5 MG      21:33:                               Roxicodone           [facility]

[facility]



     Oral Tablet      00                                 )              

 

     Oxycodone            No                       Notes:           [facility]

a



     Hydrochlori      5-04                               (Same as:           l



     de 5 MG      21:33:                               Roxicodone           [facility]

[facility]



     Oral Tablet      00                                 )              

 

     Oxycodone            No                       Notes:           [facility]

a



     Hydrochlori      5-04                               (Same as:           l



     de 5 MG      21:33:                               Roxicodone           [facility]

[facility]



     Oral Tablet      00                                 )              

 

     Oxycodone            No                       Notes:           [facility]

a



     Hydrochlori      5-04                               (Same as:           l



     de 5 MG      21:33:                               Roxicodone           [facility]

[facility]



     Oral Tablet      00                                 )              

 

     Beneprotein            No                       Notes:           [facility]

[facility]



     7 gm pkt      5-04                               (Same as:           l



               21:30:                               Beneprotei           [place]



               00                                 n)             

 

     Beneprotein            No                       Notes:           [facility]

[facility]ev



     7 gm pkt      5-04                               (Same as:           l



               21:30:                               Beneprotei           [facility]



               00                                 n)             

 

     Beneprotein            No                       Notes:           [facility]

[facility]



     7 gm pkt      5-04                               (Same as:           l



               21:30:                               Beneprotei           [place]



               00                                 n)             

 

     Beneprotein            No                       Notes:           [facility]

[facility]



     7 gm pkt      5-04                               (Same as:           l



               21:30:                               Beneprotei           [place]



               00                                 n)             

 

     Beneprotein            No                       Notes:           [facility]

[facility]



     7 gm pkt      5-04                               (Same as:           l



               21:30:                               Beneprotei           [facility]



               00                                 n)             

 

     Beneprotein            No                       Notes:           [facility]

ra[facility]



     7 gm pkt      5-04                               (Same as:           l



               21:30:                               Beneprotei           [place]



               00                                 n)             

 

     Acetaminoph            No                       1 tab, PO,           

[facility]



     en 325 MG /      5-04                               TID, 0           l



     Oxycodone      17:12:                               Refill(s)           Her

child



     Hydrochlori      00                                                



     de 10 MG                                                        



     Oral Tablet                                                        



     [Percocet                                                        



     10/325]                                                        

 

     Acetaminoph            No                       1 tab, PO,           

[facility]



     en 325 MG /      5-04                               TID, 0           l



     Oxycodone      17:12:                               Refill(s)           Her

child



     Hydrochlori      00                                                



     de 10 MG                                                        



     Oral Tablet                                                        



     [Percocet                                                        



     10/325]                                                        

 

     Acetaminoph            No                       1 tab, PO,           

[facility]



     en 325 MG /      5-04                               TID, 0           l



     Oxycodone      17:12:                               Refill(s)           Her

child



     Hydrochlori      00                                                



     de 10 MG                                                        



     Oral Tablet                                                        



     [Percocet                                                        



     10/325]                                                        

 

     Acetaminoph      2018-0      No                       1 tab, PO,           

[facility]



     en 325 MG /      5-04                               TID, 0           l



     Oxycodone      17:12:                               Refill(s)           Her

child



     Hydrochlori      00                                                



     de 10 MG                                                        



     Oral Tablet                                                        



     [Percocet                                                        



     10/325]                                                        

 

     Acetaminoph      2018-0      No                       1 tab, PO,           

[facility]



     en 325 MG /      5-04                               TID, 0           l



     Oxycodone      17:12:                               Refill(s)           Her

cihld



     Hydrochlori      00                                                



     de 10 MG                                                        



     Oral Tablet                                                        



     [Percocet                                                        



     10/325]                                                        

 

     Acetaminoph      2018-0      No                       1 tab, PO,           

[facility]



     en 325 MG /      5-04                               TID, 0           l



     Oxycodone      17:12:                               Refill(s)           Her

child



     Hydrochlori      00                                                



     de 10 MG                                                        



     Oral Tablet                                                        



     [Percocet                                                        



     10/325]                                                        

 

     Dilaudid      2018-0      No                       0.5 mg,           [facility]

a



               5-04                               0.25 mL,           l



               16:01:                               Route:           [facility]



               00                                 IVP, Drug           



                                                  form: INJ,           



                                                  ONCE,           



                                                  Start           



                                                  date:           



                                                  18           



                                                  11:01:00           



                                                  CDT, Stop           



                                                  date:           



                                                  18           



                                                  11:01:00           



                                                  CDT            

 

     Dilaudid      2018-0      No                       0.5 mg,           [facility]

a



               5-04                               0.25 mL,           l



               16:01:                               Route:           [facility]



               00                                 IVP, Drug           



                                                  form: INJ,           



                                                  ONCE,           



                                                  Start           



                                                  date:           



                                                  18           



                                                  11:01:00           



                                                  CDT, Stop           



                                                  date:           



                                                  18           



                                                  11:01:00           



                                                  CDT            

 

     Dilaudid      2018-0      No                       0.5 mg,           [facility]

a



               5-04                               0.25 mL,           l



               16:01:                               Route:           [facility]



               00                                 IVP, Drug           



                                                  form: INJ,           



                                                  ONCE,           



                                                  Start           



                                                  date:           



                                                  18           



                                                  11:01:00           



                                                  CDT, Stop           



                                                  date:           



                                                  18           



                                                  11:01:00           



                                                  CDT            

 

     Dilaudid      2018-0      No                       0.5 mg,           [facility]

a



               5-04                               0.25 mL,           l



               16:01:                               Route:           [facility]



               00                                 IVP, Drug           



                                                  form: INJ,           



                                                  ONCE,           



                                                  Start           



                                                  date:           



                                                  18           



                                                  11:01:00           



                                                  CDT, Stop           



                                                  date:           



                                                  18           



                                                  11:01:00           



                                                  CDT            

 

     Dilaudid      2018-0      No                       0.5 mg,           [facility]

a



               5-04                               0.25 mL,           l



               16:01:                               Route:           [place]



                                                IVP, Drug           



                                                  form: INJ,           



                                                  ONCE,           



                                                  Start           



                                                  date:           



                                                  18           



                                                  11:01:00           



                                                  CDT, Stop           



                                                  date:           



                                                  18           



                                                  11:01:00           



                                                  CDT            

 

     Dilaudid      2018-0      No                       0.5 mg,           [facility]

a



               5-04                               0.25 mL,           l



               16:01:                               Route:           [place]



                                                IVP, Drug           



                                                  form: INJ,           



                                                  ONCE,           



                                                  Start           



                                                  date:           



                                                  18           



                                                  11:01:00           



                                                  CDT, Stop           



                                                  date:           



                                                  18           



                                                  11:01:00           



                                                  CDT            

 

     Phenergan      2018-0      No                       Notes:           [facility]

a



               5-04                               (Same as:           l



               15:43:                               Phenergan)                                                           

 

     Dilaudid      2018-0      No                       2 mg,           [facility]



               5-04                               Route:           l



               15:43:                               IVP, ONCE,                                            Dosing           



                                                  Weight           



                                                  127.027,           



                                                  kg,            



                                                  Priority:           



                                                  STAT,           



                                                  Start           



                                                  date:           



                                                  18           



                                                  10:43:00           



                                                  CDT, Stop           



                                                  date:           



                                                  18           



                                                  10:43:00           



                                                  CDT            

 

     Phenergan      2018-0      No                       Notes:           [facility]

a



               5-04                               (Same as:           l



               15:43:                               Phenergan)                                                           

 

     Dilaudid      -0      No                       2 mg,           [facility]



               5-04                               Route:           l



               15:43:                               IVP, ONCE,                                            Dosing           



                                                  Weight           



                                                  127.027,           



                                                  kg,            



                                                  Priority:           



                                                  STAT,           



                                                  Start           



                                                  date:           



                                                  18           



                                                  10:43:00           



                                                  CDT, Stop           



                                                  date:           



                                                  18           



                                                  10:43:00           



                                                  CDT            

 

     Phenergan      2018-0      No                       Notes:           [facility]

a



               5-04                               (Same as:           l



               15:43:                               Phenergan)                                                           

 

     Dilaudid      2018-0      No                       2 mg,           [facility]



               5-04                               Route:           l



               15:43:                               IVP, ONCE,                                            Dosing           



                                                  Weight           



                                                  127.027,           



                                                  kg,            



                                                  Priority:           



                                                  STAT,           



                                                  Start           



                                                  date:           



                                                  18           



                                                  10:43:00           



                                                  CDT, Stop           



                                                  date:           



                                                  18           



                                                  10:43:00           



                                                  CDT            

 

     Phenergan      2018-0      No                       Notes:           [facility]

a



               5-04                               (Same as:           l



               15:43:                               Phenergan)           [place]                                                

 

     Dilaudid      2018-0      No                       2 mg,           [facility]



               5-04                               Route:           l



               15:43:                               IVP, ONCE,                                            Dosing           



                                                  Weight           



                                                  127.027,           



                                                  kg,            



                                                  Priority:           



                                                  STAT,           



                                                  Start           



                                                  date:           



                                                  18           



                                                  10:43:00           



                                                  CDT, Stop           



                                                  date:           



                                                  18           



                                                  10:43:00           



                                                  CDT            

 

     Phenergan            No                       Notes:           [facility]

a



               5-04                               (Same as:           l



               15:43:                               Phenergan)                                                           

 

     Dilaudid            No                       2 mg,           [facility]



               5-04                               Route:           l



               15:43:                               IVP, ONCE,                                            Dosing           



                                                  Weight           



                                                  127.027,           



                                                  kg,            



                                                  Priority:           



                                                  STAT,           



                                                  Start           



                                                  date:           



                                                  18           



                                                  10:43:00           



                                                  CDT, Stop           



                                                  date:           



                                                  18           



                                                  10:43:00           



                                                  CDT            

 

     Phenergan            No                       Notes:           [facility]

a



               5-04                               (Same as:           l



               15:43:                               Phenergan)                                                           

 

     Dilaudid            No                       2 mg,           [facility]



               5-04                               Route:           l



               15:43:                               IVP, ONCE,                                            Dosing           



                                                  Weight           



                                                  127.027,           



                                                  kg,            



                                                  Priority:           



                                                  STAT,           



                                                  Start           



                                                  date:           



                                                  18           



                                                  10:43:00           



                                                  CDT, Stop           



                                                  date:           



                                                  18           



                                                  10:43:00           



                                                  CDT            

 

     Docusate            No                       Notes:           [facility]



               5-04                               (Same as:           l



               14:00:                               [facility]ace)                                            (Do Not           



                                                  Crush)           

 

     Zinc            No                       Notes:           [facility]



     Sulfate      5-04                               (Zinc           l



               14:00:                               sulfate           [place]                                 capsule) -           



                                                  220 mg           



                                                  Zinc           



                                                  sulfate =           



                                                  50 mg           



                                                  elemental           



                                                  zinc Same           



                                                  as Zinc           



                                                  Sulfate           

 

     ascorbic            No                       Notes:           [facility]



     acid      5-04                               (Same as:           l



               14:00:                               Vitamin C)                                                           

 

     multivitami            No                       Notes:           [facility]

[facility]



     n         5-04                               (Same           l



               14:00:                               as:[facility])                                            WASTE: F/P           



                                                  - Black; E           



                                                  -              



                                                  Municipal           



                                                  Trash Bin           



                                                  Take with           



                                                  food.           

 

     Docusate            No                       Notes:           [facility]



               5-04                               (Same as:           l



               14:00:                               Colace)                                            (Do Not           



                                                  Crush)           

 

     Zinc            No                       Notes:           [facility]



     Sulfate      5-04                               (Zinc           l



               14:00:                               sulfate           [facility]                                 capsule) -           



                                                  220 mg           



                                                  Zinc           



                                                  sulfate =           



                                                  50 mg           



                                                  elemental           



                                                  zinc Same           



                                                  as Zinc           



                                                  Sulfate           

 

     ascorbic            No                       Notes:           [facility]



     acid      5-04                               (Same as:           l



               14:00:                               Vitamin C)           [facility]



                                                               

 

     multivitami            No                       Notes:           [facility]

[facility]



     n         5-04                               (Same           l



               14:00:                               as:Thera)           [facility]



                                                WASTE: F/P           



                                                  - Black; E           



                                                  -              



                                                  Municipal           



                                                  Trash Bin           



                                                  Take with           



                                                  food.           

 

     Docusate            No                       Notes:           [facility]



               5-04                               (Same as:           l



               14:00:                               Colace)           [facility]



                                                (Do Not           



                                                  Crush)           

 

     Zinc            No                       Notes:           [facility]



     Sulfate      5-04                               (Zinc           l



               14:00:                               sulfate           [facility]



               00                                 capsule) -           



                                                  220 mg           



                                                  Zinc           



                                                  sulfate =           



                                                  50 mg           



                                                  elemental           



                                                  zinc Same           



                                                  as Zinc           



                                                  Sulfate           

 

     ascorbic            No                       Notes:           [facility]



     acid      5-04                               (Same as:           l



               14:00:                               Vitamin C)           [facility]



                                                               

 

     multivitami            No                       Notes:           [facility]

[facility]



     n         5-04                               (Same           l



               14:00:                               as:Thera)           [facility]



                                                WASTE: F/P           



                                                  - Black; E           



                                                  -              



                                                  Municipal           



                                                  Trash Bin           



                                                  Take with           



                                                  food.           

 

     Docusate            No                       Notes:           [facility]



               5-04                               (Same as:           l



               14:00:                               Colace)           [facility]



                                                (Do Not           



                                                  Crush)           

 

     Zinc            No                       Notes:           [facility]



     Sulfate      5-04                               (Zinc           l



               14:00:                               sulfate           [facility]



               00                                 capsule) -           



                                                  220 mg           



                                                  Zinc           



                                                  sulfate =           



                                                  50 mg           



                                                  elemental           



                                                  zinc Same           



                                                  as Zinc           



                                                  Sulfate           

 

     ascorbic            No                       Notes:           [facility]



     acid      5-04                               (Same as:           l



               14:00:                               Vitamin C)           [facility]



                                                               

 

     multivitami            No                       Notes:           [facility]

[facility]



     n         5-04                               (Same           l



               14:00:                               as:Thera)           [facility]



                                                WASTE: F/P           



                                                  - Black; E           



                                                  -              



                                                  Municipal           



                                                  Trash Bin           



                                                  Take with           



                                                  food.           

 

     Docusate            No                       Notes:           [facility]



               5-04                               (Same as:           l



               14:00:                               Colace)           [facility]



                                                (Do Not           



                                                  Crush)           

 

     Zinc            No                       Notes:           [facility]



     Sulfate      5-04                               (Zinc           l



               14:00:                               sulfate           [facility]



               00                                 capsule) -           



                                                  220 mg           



                                                  Zinc           



                                                  sulfate =           



                                                  50 mg           



                                                  elemental           



                                                  zinc Same           



                                                  as Zinc           



                                                  Sulfate           

 

     ascorbic            No                       Notes:           [facility]



     acid      5-04                               (Same as:           l



               14:00:                               Vitamin C)           [facility]



                                                               

 

     multivitami            No                       Notes:           [facility]

[facility]



     n         5-04                               (Same           l



               14:00:                               as:Thera)                                            WASTE: F/P           



                                                  - Black; E           



                                                  -              



                                                  Municipal           



                                                  Trash Bin           



                                                  Take with           



                                                  food.           

 

     Docusate            No                       Notes:           [facility]



               5-04                               (Same as:           l



               14:00:                               Colace)                                            (Do Not           



                                                  Crush)           

 

     Zinc            No                       Notes:           [facility]



     Sulfate      -                               (Zinc           l



               14:00:                               sulfate           [facility]                                 capsule) -           



                                                  220 mg           



                                                  Zinc           



                                                  sulfate =           



                                                  50 mg           



                                                  elemental           



                                                  zinc Same           



                                                  as Zinc           



                                                  Sulfate           

 

     ascorbic            No                       Notes:           [facility]



     acid      -04                               (Same as:           l



               14:00:                               Vitamin C)           [facility]                                                

 

     multivitami            No                       Notes:           [facility]

[facility]



     n         -04                               (Same           l



               14:00:                               as:Thera)                                            WASTE: F/P           



                                                  - Black; E           



                                                  -              



                                                  Municipal           



                                                  Trash Bin           



                                                  Take with           



                                                  food.           

 

     Naproxen            No                       Notes:           [facility]



               5-04                               (Same as:           l



               07:00:                               Naprosyn)           [facility]                                 Take with           



                                                  food.           

 

     Zosyn            No                       Notes:           [facility]



               5-04                               (Same as:           l



               07:00:                               Zosyn)           [facility]                                 Dosing           



                                                  based on           



                                                  Piperacill           



                                                  in             



                                                  component           



                                                  ***            



                                                  MEDICATION           



                                                  WASTE ***           



                                                  Product           



                                                  Size: 3375           



                                                  mg Product           



                                                  Wasted:           



                                                  ___ mg           

 

     Vancomycin            No                        2001 mg:           [facility]

[facility]



               5-04                               infuse           l



               07:00:                               over 2.5           [facility]



               00                                 hours For           



                                                  adult           



                                                  patients           



                                                  only:           



                                                  Round to           



                                                  nearest           



                                                  250 mg per           



                                                  Medical           



                                                  Staff           



                                                  approval           



                                                  ***            



                                                  MEDICATION           



                                                  WASTE ***           



                                                  Product           



                                                  Size: 1000           



                                                  mg Product           



                                                  Wasted:           



                                                  ___ mg           

 

     Enoxaparin            No                       Notes:           [facility]

[facility]



               -04                               (Same as:           l



               07:00:                               Lovenox)           [facility]



                                                               

 

     Naproxen            No                       Notes:           [facility]



               5-04                               (Same as:           l



               07:00:                               Naprosyn)           [facility]



               00                                 Take with           



                                                  food.           

 

     Zosyn            No                       Notes:           [facility]



               5-04                               (Same as:           l



               07:00:                               Zosyn)           [facility]



                                                Dosing           



                                                  based on           



                                                  Piperacill           



                                                  in             



                                                  component           



                                                  ***            



                                                  MEDICATION           



                                                  WASTE ***           



                                                  Product           



                                                  Size: 3375           



                                                  mg Product           



                                                  Wasted:           



                                                  ___ mg           

 

     Vancomycin            No                        2001 mg:           [facility]

[facility]



               5-04                               infuse           l



               07:00:                               over 2.5           [facility]



               00                                 hours For           



                                                  adult           



                                                  patients           



                                                  only:           



                                                  Round to           



                                                  nearest           



                                                  250 mg per           



                                                  Medical           



                                                  Staff           



                                                  approval           



                                                  ***            



                                                  MEDICATION           



                                                  WASTE ***           



                                                  Product           



                                                  Size: 1000           



                                                  mg Product           



                                                  Wasted:           



                                                  ___ mg           

 

     Enoxaparin      2018-0      No                       Notes:           [facility]

[facility]



               5-04                               (Same as:           l



               07:00:                               Lovenox)           [facility]



               00                                                

 

     Naproxen      -0      No                       Notes:           [facility]



               5-04                               (Same as:           l



               07:00:                               Naprosyn)           [facility]



               00                                 Take with           



                                                  food.           

 

     Zosyn      0      No                       Notes:           [facility]



               5-04                               (Same as:           l



               07:00:                               Zosyn)           [facility]



               00                                 Dosing           



                                                  based on           



                                                  Piperacill           



                                                  in             



                                                  component           



                                                  ***            



                                                  MEDICATION           



                                                  WASTE ***           



                                                  Product           



                                                  Size: 3375           



                                                  mg Product           



                                                  Wasted:           



                                                  ___ mg           

 

     Vancomycin      2018-0      No                        2001 mg:           [facility]

[facility]



               5-04                               infuse           l



               07:00:                               over 2.5           [facility]



               00                                 hours For           



                                                  adult           



                                                  patients           



                                                  only:           



                                                  Round to           



                                                  nearest           



                                                  250 mg per           



                                                  Medical           



                                                  Staff           



                                                  approval           



                                                  ***            



                                                  MEDICATION           



                                                  WASTE ***           



                                                  Product           



                                                  Size: 1000           



                                                  mg Product           



                                                  Wasted:           



                                                  ___ mg           

 

     Enoxaparin      2018-0      No                       Notes:           [facility]

[facility]



               5-04                               (Same as:           l



               07:00:                               Lovenox)           [facility]



               00                                                

 

     Naproxen      0      No                       Notes:           [facility]



               5-04                               (Same as:           l



               07:00:                               Naprosyn)           [facility]



               00                                 Take with           



                                                  food.           

 

     Zosyn            No                       Notes:           [facility]



               5-04                               (Same as:           l



               07:00:                               Zosyn)           [facility]



               00                                 Dosing           



                                                  based on           



                                                  Piperacill           



                                                  in             



                                                  component           



                                                  ***            



                                                  MEDICATION           



                                                  WASTE ***           



                                                  Product           



                                                  Size: 3375           



                                                  mg Product           



                                                  Wasted:           



                                                  ___ mg           

 

     Vancomycin      2018-0      No                        2001 mg:           [facility]

[facility]



               5-04                               infuse           l



               07:00:                               over 2.5           [facility]



               00                                 hours For           



                                                  adult           



                                                  patients           



                                                  only:           



                                                  Round to           



                                                  nearest           



                                                  250 mg per           



                                                  Medical           



                                                  Staff           



                                                  approval           



                                                  ***            



                                                  MEDICATION           



                                                  WASTE ***           



                                                  Product           



                                                  Size: 1000           



                                                  mg Product           



                                                  Wasted:           



                                                  ___ mg           

 

     Enoxaparin      2018-0      No                       Notes:           [facility]

[facility]



               5-04                               (Same as:           l



               07:00:                               Lovenox)           [facility]



               00                                                

 

     Naproxen      -0      No                       Notes:           [facility]



               5-04                               (Same as:           l



               07:00:                               Naprosyn)           [facility]



               00                                 Take with           



                                                  food.           

 

     Zosyn      0      No                       Notes:           [facility]



               5-04                               (Same as:           l



               07:00:                               Zosyn)           [facility]                                 Dosing           



                                                  based on           



                                                  Piperacill           



                                                  in             



                                                  component           



                                                  ***            



                                                  MEDICATION           



                                                  WASTE ***           



                                                  Product           



                                                  Size: 3375           



                                                  mg Product           



                                                  Wasted:           



                                                  ___ mg           

 

     Vancomycin      2018-0      No                        2001 mg:           [facility]

[facility]



               5-04                               infuse           l



               07:00:                               over 2.5           [facility]



               00                                 hours For           



                                                  adult           



                                                  patients           



                                                  only:           



                                                  Round to           



                                                  nearest           



                                                  250 mg per           



                                                  Medical           



                                                  Staff           



                                                  approval           



                                                  ***            



                                                  MEDICATION           



                                                  WASTE ***           



                                                  Product           



                                                  Size: 1000           



                                                  mg Product           



                                                  Wasted:           



                                                  ___ mg           

 

     Enoxaparin      -      No                       Notes:           [facility]

[facility]



               -04                               (Same as:           l



               07:00:                               Lovenox)           [facility]



                                                               

 

     Naproxen      -      No                       Notes:           [facility]



               5-04                               (Same as:           l



               07:00:                               Naprosyn)           [facility]                                 Take with           



                                                  food.           

 

     Zosyn            No                       Notes:           [facility]



               5-04                               (Same as:           l



               07:00:                               Zosyn)           [facility]                                 Dosing           



                                                  based on           



                                                  Piperacill           



                                                  in             



                                                  component           



                                                  ***            



                                                  MEDICATION           



                                                  WASTE ***           



                                                  Product           



                                                  Size: 3375           



                                                  mg Product           



                                                  Wasted:           



                                                  ___ mg           

 

     Vancomycin      -0      No                        2001 mg:           [facility]

[facility]



               5-04                               infuse           l



               07:00:                               over 2.5           [facility]



               00                                 hours For           



                                                  adult           



                                                  patients           



                                                  only:           



                                                  Round to           



                                                  nearest           



                                                  250 mg per           



                                                  Medical           



                                                  Staff           



                                                  approval           



                                                  ***            



                                                  MEDICATION           



                                                  WASTE ***           



                                                  Product           



                                                  Size: 1000           



                                                  mg Product           



                                                  Wasted:           



                                                  ___ mg           

 

     Enoxaparin      -      No                       Notes:           [facility]

[facility]



               5-04                               (Same as:           l



               07:00:                               Lovenox)                                                           

 

     Sodium            No                       1,000 mL,           [facility]

a



     Chloride                                     Rate: 125           l



     0.9% IV      06:46:                               ml/hr,           [facility]



     1,000 mL      00                                 Infuse           



                                                  over: 8           



                                                  hr, Route:           



                                                  IV, Dosing           



                                                  Weight           



                                                  127.27 kg,           



                                                  Total           



                                                  Volume:           



                                                  1,000,           



                                                  Start           



                                                  date:           



                                                  18           



                                                  1:46:00           



                                                  CDT,           



                                                  Duration:           



                                                  30 day,           



                                                  Stop date:           



                                                  18           



                                                  1:45:00           



                                                  CDT, 2.44,           



                                                  m2             

 

     Saline            No                       Notes:           [facility]



     Flush 0.9%      -                               (Same as:           l



               06:46:                               BD             [facility]



               00                                 Posiflush)           

 

     Acetaminoph            No                       Notes: Do           [facility]

[facility]



     en        -                               not exceed           l



               06:46:                               4 gm/day.           [facility]



               00                                 (Same as:           



                                                  Tylenol)           

 

     Acetaminoph            No                       Notes:           [facility]

[facility]



     en 325 MG /      5-04                               (Same as:           l



     Hydrocodone      06:46:                               Norco           [facility]

nn



     Bitartrate      00                                 325/5) Do           



     5 MG Oral                                         not exceed           



     Tablet                                         4gm/day of           



                                                  acetaminop           



                                                  hen.           

 

     Sodium            No                       1,000 mL,           [facility]

a



     Chloride      5-04                               Rate: 125           l



     0.9% IV      06:46:                               ml/hr,           [facility]



     1,000 mL      00                                 Infuse           



                                                  over: 8           



                                                  hr, Route:           



                                                  IV, Dosing           



                                                  Weight           



                                                  127.27 kg,           



                                                  Total           



                                                  Volume:           



                                                  1,000,           



                                                  Start           



                                                  date:           



                                                  18           



                                                  1:46:00           



                                                  CDT,           



                                                  Duration:           



                                                  30 day,           



                                                  Stop date:           



                                                  18           



                                                  1:45:00           



                                                  CDT, 2.44,           



                                                  m2             

 

     Saline            No                       Notes:           [facility]



     Flush 0.9%      5-04                               (Same as:           l



               06:46:                               BD             [facility]



               00                                 Posiflush)           

 

     Acetaminoph            No                       Notes: Do           [facility]

[facility]



     en        5-04                               not exceed           l



               06:46:                               4 gm/day.           [facility]



               00                                 (Same as:           



                                                  Tylenol)           

 

     Acetaminoph            No                       Notes:           [facility]

[facility]



     en 325 MG /      5-04                               (Same as:           l



     Hydrocodone      06:46:                               Norco           [facility]

nn



     Bitartrate      00                                 325/5) Do           



     5 MG Oral                                         not exceed           



     Tablet                                         4gm/day of           



                                                  acetaminop           



                                                  hen.           

 

     Sodium            No                       1,000 mL,           [facility]

a



     Chloride      5-04                               Rate: 125           l



     0.9% IV      06:46:                               ml/hr,           [facility]



     1,000 mL      00                                 Infuse           



                                                  over: 8           



                                                  hr, Route:           



                                                  IV, Dosing           



                                                  Weight           



                                                  127.27 kg,           



                                                  Total           



                                                  Volume:           



                                                  1,000,           



                                                  Start           



                                                  date:           



                                                  18           



                                                  1:46:00           



                                                  CDT,           



                                                  Duration:           



                                                  30 day,           



                                                  Stop date:           



                                                  18           



                                                  1:45:00           



                                                  CDT, 2.44,           



                                                  m2             

 

     Saline            No                       Notes:           [facility]



     Flush 0.9%      5-04                               (Same as:           l



               06:46:                               BD             [facility]



               00                                 Posiflush)           

 

     Acetaminoph            No                       Notes: Do           [facility]

[facility]



     en        5-04                               not exceed           l



               06:46:                               4 gm/day.           [facility]



               00                                 (Same as:           



                                                  Tylenol)           

 

     Acetaminoph            No                       Notes:           [facility]

[facility]



     en 325 MG /      5-04                               (Same as:           l



     Hydrocodone      06:46:                               Norco           [facility]

nn



     Bitartrate      00                                 325/5) Do           



     5 MG Oral                                         not exceed           



     Tablet                                         4gm/day of           



                                                  acetaminop           



                                                  hen.           

 

     Sodium            No                       1,000 mL,           [facility]

a



     Chloride      5-04                               Rate: 125           l



     0.9% IV      06:46:                               ml/hr,           [facility]



     1,000 mL      00                                 Infuse           



                                                  over: 8           



                                                  hr, Route:           



                                                  IV, Dosing           



                                                  Weight           



                                                  127.27 kg,           



                                                  Total           



                                                  Volume:           



                                                  1,000,           



                                                  Start           



                                                  date:           



                                                  18           



                                                  1:46:00           



                                                  CDT,           



                                                  Duration:           



                                                  30 day,           



                                                  Stop date:           



                                                  18           



                                                  1:45:00           



                                                  CDT, 2.44,           



                                                  m2             

 

     Saline            No                       Notes:           [facility]



     Flush 0.9%      5-04                               (Same as:           l



               06:46:                               BD             [facility]



               00                                 Posiflush)           

 

     Acetaminoph            No                       Notes: Do           [facility]

[facility]



     en        5-04                               not exceed           l



               06:46:                               4 gm/day.           [facility]



               00                                 (Same as:           



                                                  Tylenol)           

 

     Acetaminoph            No                       Notes:           [facility]

[facility]



     en 325 MG /      5-04                               (Same as:           l



     Hydrocodone      06:46:                               Norco           [facility]

nn



     Bitartrate      00                                 325/5) Do           



     5 MG Oral                                         not exceed           



     Tablet                                         4gm/day of           



                                                  acetaminop           



                                                  hen.           

 

     Sodium            No                       1,000 mL,           [facility]

a



     Chloride      5-04                               Rate: 125           l



     0.9% IV      06:46:                               ml/hr,           [facility]



     1,000 mL      00                                 Infuse           



                                                  over: 8           



                                                  hr, Route:           



                                                  IV, Dosing           



                                                  Weight           



                                                  127.27 kg,           



                                                  Total           



                                                  Volume:           



                                                  1,000,           



                                                  Start           



                                                  date:           



                                                  18           



                                                  1:46:00           



                                                  CDT,           



                                                  Duration:           



                                                  30 day,           



                                                  Stop date:           



                                                  18           



                                                  1:45:00           



                                                  CDT, 2.44,           



                                                  m2             

 

     Saline            No                       Notes:           [facility]



     Flush 0.9%      5-04                               (Same as:           l



               06:46:                               BD             [facility]



               00                                 Posiflush)           

 

     Acetaminoph            No                       Notes: Do           [facility]

[facility]



     en        5-04                               not exceed           l



               06:46:                               4 gm/day.           [facility]



               00                                 (Same as:           



                                                  Tylenol)           

 

     Acetaminoph            No                       Notes:           [facility]

[facility]



     en 325 MG /      5-04                               (Same as:           l



     Hydrocodone      06:46:                               [place]co           [facility]

nn



     Bitartrate      00                                 325/5) Do           



     5 MG Oral                                         not exceed           



     Tablet                                         4gm/day of           



                                                  acetaminop           



                                                  hen.           

 

     Sodium            No                       1,000 mL,           [facility]

a



     Chloride      -04                               Rate: 125           l



     0.9% IV      06:46:                               ml/hr,           [facility]



     1,000 mL      00                                 Infuse           



                                                  over: 8           



                                                  hr, Route:           



                                                  IV, Dosing           



                                                  Weight           



                                                  127.27 kg,           



                                                  Total           



                                                  Volume:           



                                                  1,000,           



                                                  Start           



                                                  date:           



                                                  18           



                                                  1:46:00           



                                                  CDT,           



                                                  Duration:           



                                                  30 day,           



                                                  Stop date:           



                                                  18           



                                                  1:45:00           



                                                  CDT, 2.44,           



                                                  m2             

 

     Saline            No                       Notes:           [facility]



     Flush 0.9%      5-04                               (Same as:           l



               06:46:                               BD             [facility]



               00                                 Posiflush)           

 

     Acetaminoph            No                       Notes: Do           [facility]

[facility]



     en        5-04                               not exceed           l



               06:46:                               4 gm/day.           [facility]



                                                (Same as:           



                                                  Tylenol)           

 

     Acetaminoph            No                       Notes:           [facility]

[facility]



     en 325 MG /      5-04                               (Same as:           l



     Hydrocodone      06:46:                               Norco           [facility]

nn



     Bitartrate      00                                 325/5) Do           



     5 MG Oral                                         not exceed           



     Tablet                                         4gm/day of           



                                                  acetaminop           



                                                  hen.           

 

     Reglan            No                       Notes:           [facility]



               5-04                               (Same as:           l



               04:27:                               Reglan)           [facility]                                                

 

     Benadryl            No                       Notes:           [facility]



               5-04                               (Same as:           l



               04:27:                               Benadryl)           [facility]



                                                               

 

     Reglan            No                       Notes:           [facility]



               5-04                               (Same as:           l



               04:27:                               Reglan)           [facility]



                                                               

 

     Benadryl            No                       Notes:           [facility]



               5-04                               (Same as:           l



               04:27:                               Benadryl)           [facility]



                                                               

 

     Reglan            No                       Notes:           [facility]



               5-04                               (Same as:           l



               04:27:                               Reglan)           [facility]



                                                               

 

     Benadryl            No                       Notes:           [facility]



               5-04                               (Same as:           l



               04:27:                               Benadryl)           [facility]



                                                               

 

     Reglan            No                       Notes:           [facility]



               5-04                               (Same as:           l



               04:27:                               Reglan)           [facility]



                                                               

 

     Benadryl            No                       Notes:           [facility]



               5-04                               (Same as:           l



               04:27:                               Benadryl)           [facility]



                                                               

 

     Reglan            No                       Notes:           [facility]



               5-04                               (Same as:           l



               04:27:                               Reglan)           [facility]



                                                               

 

     Benadryl            No                       Notes:           [facility]



               5-04                               (Same as:           l



               04:27:                               Benadryl)           [facility]



                                                               

 

     Reglan            No                       Notes:           [facility]



               5-04                               (Same as:           l



               04:27:                               Reglan)           [facility]



                                                               

 

     Benadryl            No                       Notes:           [facility]



               5-04                               (Same as:           l



               04:27:                               Benadryl)           [facility]



                                                               

 

     Magnesium            No                       Notes:           [facility]

a



     Sulfate      5-04                               WASTE: F/P           l



               04:26:                               - Sink; E           [facility]



                                                -              



                                                  [facility]           



                                                  Trash Bin           

 

     Sodium            No                       1,000 mL,           [facility]

a



     Chloride      5-04                               1000           l



     0.9%      04:26:                               ml/hr,           [facility]



     (Bolus) IV      00                                 Infuse           



                                                  Over: 1           



                                                  hr, Route:           



                                                  IV, 1,000,           



                                                  Drug form:           



                                                  INJ, ONCE,           



                                                  Priority:           



                                                  STAT,           



                                                  Dosing           



                                                  Weight           



                                                  127.273           



                                                  kg, Start           



                                                  date:           



                                                  18           



                                                  23:26:00           



                                                  CDT, Stop           



                                                  date:           



                                                  18           



                                                  23:26:00           



                                                  CDT            

 

     Magnesium            No                       Notes:           [facility]

a



     Sulfate      5-04                               WASTE: F/P           l



               04:26:                               - Sink; E           [facility]



                                                -              



                                                  Municipal           



                                                  Trash Bin           

 

     Sodium            No                       1,000 mL,           [facility]

a



     Chloride      5-04                               1000           l



     0.9%      04:26:                               ml/hr,           [facility]



     (Bolus) IV      00                                 Infuse           



                                                  Over: 1           



                                                  hr, Route:           



                                                  IV, 1,000,           



                                                  Drug form:           



                                                  INJ, ONCE,           



                                                  Priority:           



                                                  STAT,           



                                                  Dosing           



                                                  Weight           



                                                  127.273           



                                                  kg, Start           



                                                  date:           



                                                  18           



                                                  23:26:00           



                                                  CDT, Stop           



                                                  date:           



                                                  18           



                                                  23:26:00           



                                                  CDT            

 

     Magnesium            No                       Notes:           [facility]

a



     Sulfate      5-04                               WASTE: F/P           l



               04:26:                               - Sink; E           [facility]



                                                -              



                                                  Municipal           



                                                  Trash Bin           

 

     Sodium            No                       1,000 mL,           [facility]

a



     Chloride      5-04                               1000           l



     0.9%      04:26:                               ml/hr,           [facility]



     (Bolus) IV      00                                 Infuse           



                                                  Over: 1           



                                                  hr, Route:           



                                                  IV, 1,000,           



                                                  Drug form:           



                                                  INJ, ONCE,           



                                                  Priority:           



                                                  STAT,           



                                                  Dosing           



                                                  Weight           



                                                  127.273           



                                                  kg, Start           



                                                  date:           



                                                  18           



                                                  23:26:00           



                                                  CDT, Stop           



                                                  date:           



                                                  18           



                                                  23:26:00           



                                                  CDT            

 

     Magnesium      2018-0      No                       Notes:           [facility]

a



     Sulfate      5-04                               WASTE: F/P           l



               04:26:                               - Sink; E           [facility]



               00                                 -              



                                                  Municipal           



                                                  Trash Bin           

 

     Sodium      2018-0      No                       1,000 mL,           [facility]

a



     Chloride      5-04                               1000           l



     0.9%      04:26:                               ml/hr,           [facility]



     (Bolus) IV      00                                 Infuse           



                                                  Over: 1           



                                                  hr, Route:           



                                                  IV, 1,000,           



                                                  Drug form:           



                                                  INJ, ONCE,           



                                                  Priority:           



                                                  STAT,           



                                                  Dosing           



                                                  Weight           



                                                  127.273           



                                                  kg, Start           



                                                  date:           



                                                  18           



                                                  23:26:00           



                                                  CDT, Stop           



                                                  date:           



                                                  18           



                                                  23:26:00           



                                                  CDT            

 

     Magnesium      2018-0      No                       Notes:           [facility]

a



     Sulfate      5-04                               WASTE: F/P           l



               04:26:                               - Sink; E           [facility]



               00                                 -              



                                                  Municipal           



                                                  Trash Bin           

 

     Sodium      2018-0      No                       1,000 mL,           [facility]

a



     Chloride      5-04                               1000           l



     0.9%      04:26:                               ml/hr,           [facility]



     (Bolus) IV      00                                 Infuse           



                                                  Over: 1           



                                                  hr, Route:           



                                                  IV, 1,000,           



                                                  Drug form:           



                                                  INJ, ONCE,           



                                                  Priority:           



                                                  STAT,           



                                                  Dosing           



                                                  Weight           



                                                  127.273           



                                                  kg, Start           



                                                  date:           



                                                  18           



                                                  23:26:00           



                                                  CDT, Stop           



                                                  date:           



                                                  18           



                                                  23:26:00           



                                                  CDT            

 

     Magnesium      2018-0      No                       Notes:           [facility]

a



     Sulfate      5-04                               WASTE: F/P           l



               04:26:                               - Sink; E           [facility]



               00                                 -              



                                                  Municipal           



                                                  Trash Bin           

 

     Sodium      2018-0      No                       1,000 mL,           [facility]

a



     Chloride      5-04                               1000           l



     0.9%      04:26:                               ml/hr,           [facility]



     (Bolus) IV      00                                 Infuse           



                                                  Over: 1           



                                                  hr, Route:           



                                                  IV, 1,000,           



                                                  Drug form:           



                                                  INJ, ONCE,           



                                                  Priority:           



                                                  STAT,           



                                                  Dosing           



                                                  Weight           



                                                  127.273           



                                                  kg, Start           



                                                  date:           



                                                  18           



                                                  23:26:00           



                                                  CDT, Stop           



                                                  date:           



                                                  18           



                                                  23:26:00           



                                                  CDT            

 

     Zosyn      2018-0      No                       4.5 gm,           [facility]



               5-04                               Route:           l



               04:10:                               IVPB,           [facility]



               00                                 ONCE,           



                                                  Dosing           



                                                  Weight           



                                                  127.273,           



                                                  kg,            



                                                  Priority:           



                                                  STAT,           



                                                  Start           



                                                  date:           



                                                  18           



                                                  23:10:00           



                                                  CDT, Stop           



                                                  date:           



                                                  18           



                                                  23:10:00           



                                                  CDT, ABX           



                                                  Indication           



                                                  :              



                                                  Bacteremia           

 

     Vancomycin      2018-0      No                         mg:           [facility]

[facility]



               5-04                               infuse           l



               04:10:                               over 2.5           [facility]



               00                                 hours For           



                                                  adult           



                                                  patients           



                                                  only:           



                                                  Round to           



                                                  nearest           



                                                  250 mg per           



                                                  Medical           



                                                  Staff           



                                                  approval           



                                                  ***            



                                                  MEDICATION           



                                                  WASTE ***           



                                                  Product           



                                                  Size: 1000           



                                                  mg Product           



                                                  Wasted:           



                                                  ___ mg           

 

     Zosyn      2018-0      No                       4.5 gm,           [facility]



               5-04                               Route:           l



               04:10:                               IVPB,           [facility]



               00                                 ONCE,           



                                                  Dosing           



                                                  Weight           



                                                  127.273,           



                                                  kg,            



                                                  Priority:           



                                                  STAT,           



                                                  Start           



                                                  date:           



                                                  18           



                                                  23:10:00           



                                                  CDT, Stop           



                                                  date:           



                                                  18           



                                                  23:10:00           



                                                  CDT, ABX           



                                                  Indication           



                                                  :              



                                                  Bacteremia           

 

     Vancomycin      2018-0      No                         mg:           [facility]

[facility]



               5-04                               infuse           l



               04:10:                               over 2.5           [facility]



               00                                 hours For           



                                                  adult           



                                                  patients           



                                                  only:           



                                                  Round to           



                                                  nearest           



                                                  250 mg per           



                                                  Medical           



                                                  Staff           



                                                  approval           



                                                  ***            



                                                  MEDICATION           



                                                  WASTE ***           



                                                  Product           



                                                  Size: 1000           



                                                  mg Product           



                                                  Wasted:           



                                                  ___ mg           

 

     Zosyn      2018-0      No                       4.5 gm,           [facility]



               5-04                               Route:           l



               04:10:                               IVPB,           [facility]



               00                                 ONCE,           



                                                  Dosing           



                                                  Weight           



                                                  127.273,           



                                                  kg,            



                                                  Priority:           



                                                  STAT,           



                                                  Start           



                                                  date:           



                                                  18           



                                                  23:10:00           



                                                  CDT, Stop           



                                                  date:           



                                                  18           



                                                  23:10:00           



                                                  CDT, ABX           



                                                  Indication           



                                                  :              



                                                  Bacteremia           

 

     Vancomycin      2018-0      No                         mg:           [facility]

[facility]



               5-04                               infuse           l



               04:10:                               over 2.5           [facility]



               00                                 hours For           



                                                  adult           



                                                  patients           



                                                  only:           



                                                  Round to           



                                                  nearest           



                                                  250 mg per           



                                                  Medical           



                                                  Staff           



                                                  approval           



                                                  ***            



                                                  MEDICATION           



                                                  WASTE ***           



                                                  Product           



                                                  Size: 1000           



                                                  mg Product           



                                                  Wasted:           



                                                  ___ mg           

 

     Zosyn      2018-0      No                       4.5 gm,           [facility]



               5-04                               Route:           l



               04:10:                               IVPB,           [facility]



               00                                 ONCE,           



                                                  Dosing           



                                                  Weight           



                                                  127.273,           



                                                  kg,            



                                                  Priority:           



                                                  STAT,           



                                                  Start           



                                                  date:           



                                                  18           



                                                  23:10:00           



                                                  CDT, Stop           



                                                  date:           



                                                  18           



                                                  23:10:00           



                                                  CDT, ABX           



                                                  Indication           



                                                  :              



                                                  Bacteremia           

 

     Vancomycin      2018-0      No                        2001 mg:           [facility]

[facility]



               5-04                               infuse           l



               04:10:                               over 2.5           [facility]



               00                                 hours For           



                                                  adult           



                                                  patients           



                                                  only:           



                                                  Round to           



                                                  nearest           



                                                  250 mg per           



                                                  Medical           



                                                  Staff           



                                                  approval           



                                                  ***            



                                                  MEDICATION           



                                                  WASTE ***           



                                                  Product           



                                                  Size: 1000           



                                                  mg Product           



                                                  Wasted:           



                                                  ___ mg           

 

     Zosyn      2018-0      No                       4.5 gm,           [facility]



               5-04                               Route:           l



               04:10:                               IVPB,           [facility]



               00                                 ONCE,           



                                                  Dosing           



                                                  Weight           



                                                  127.273,           



                                                  kg,            



                                                  Priority:           



                                                  STAT,           



                                                  Start           



                                                  date:           



                                                  18           



                                                  23:10:00           



                                                  CDT, Stop           



                                                  date:           



                                                  18           



                                                  23:10:00           



                                                  CDT, ABX           



                                                  Indication           



                                                  :              



                                                  Bacteremia           

 

     Vancomycin      2018-0      No                         mg:           [facility]

[facility]



               5-04                               infuse           l



               04:10:                               over 2.5           [facility]



               00                                 hours For           



                                                  adult           



                                                  patients           



                                                  only:           



                                                  Round to           



                                                  nearest           



                                                  250 mg per           



                                                  Medical           



                                                  Staff           



                                                  approval           



                                                  ***            



                                                  MEDICATION           



                                                  WASTE ***           



                                                  Product           



                                                  Size: 1000           



                                                  mg Product           



                                                  Wasted:           



                                                  ___ mg           

 

     Zosyn      2018-0      No                       4.5 gm,           [facility]



               5                               Route:           l



               04:10:                               IVPB,           [facility]



               00                                 ONCE,           



                                                  Dosing           



                                                  Weight           



                                                  127.273,           



                                                  kg,            



                                                  Priority:           



                                                  STAT,           



                                                  Start           



                                                  date:           



                                                  18           



                                                  23:10:00           



                                                  CDT, Stop           



                                                  date:           



                                                  18           



                                                  23:10:00           



                                                  CDT, ABX           



                                                  Indication           



                                                  :              



                                                  Bacteremia           

 

     Vancomycin      -0      No                         mg:           [facility]

[facility]



               5-04                               infuse           l



               04:10:                               over 2.5           [facility]



               00                                 hours For           



                                                  adult           



                                                  patients           



                                                  only:           



                                                  Round to           



                                                  nearest           



                                                  250 mg per           



                                                  Medical           



                                                  Staff           



                                                  approval           



                                                  ***            



                                                  MEDICATION           



                                                  WASTE ***           



                                                  Product           



                                                  Size: 1000           



                                                  mg Product           



                                                  Wasted:           



                                                  ___ mg           

 

     normal      2018-0      No                       1,000 mL,           [facility]

a



     saline 0.9%      5-04                               Rate: 75           l



     IV 1,000 mL      03:39:                               ml/hr,           [facility]

[facility]



               00                                 Infuse           



                                                  over: 13.3           



                                                  hr, Route:           



                                                  IV, Dosing           



                                                  Weight           



                                                  127.273           



                                                  kg, Total           



                                                  Volume:           



                                                  1,000,           



                                                  Start           



                                                  date:           



                                                  18           



                                                  22:39:00           



                                                  CDT,           



                                                  Duration:           



                                                  30 day,           



                                                  Stop date:           



                                                  18           



                                                  22:38:00           



                                                  CDT, 2.36,           



                                                  m2             

 

     normal      2018-0      No                       1,000 mL,           [facility]

a



     saline 0.9%      5-04                               Rate: 75           l



     IV 1,000 mL      03:39:                               ml/hr,           [facility]

[facility]



               00                                 Infuse           



                                                  over: 13.3           



                                                  hr, Route:           



                                                  IV, Dosing           



                                                  Weight           



                                                  127.273           



                                                  kg, Total           



                                                  Volume:           



                                                  1,000,           



                                                  Start           



                                                  date:           



                                                  18           



                                                  22:39:00           



                                                  CDT,           



                                                  Duration:           



                                                  30 day,           



                                                  Stop date:           



                                                  18           



                                                  22:38:00           



                                                  CDT, 2.36,           



                                                  m2             

 

     normal      2018-0      No                       1,000 mL,           [facility]

[facility]



     saline 0.9%      5-04                               Rate: 75           l



     IV 1,000 mL      03:39:                               ml/hr,           [facility]

[facility]



               00                                 Infuse           



                                                  over: 13.3           



                                                  hr, Route:           



                                                  IV, Dosing           



                                                  Weight           



                                                  127.273           



                                                  kg, Total           



                                                  Volume:           



                                                  1,000,           



                                                  Start           



                                                  date:           



                                                  18           



                                                  22:39:00           



                                                  CDT,           



                                                  Duration:           



                                                  30 day,           



                                                  Stop date:           



                                                  18           



                                                  22:38:00           



                                                  CDT, 2.36,           



                                                  m2             

 

     normal      2018-0      No                       1,000 mL,           [facility]

[facility]



     saline 0.9%      5-04                               Rate: 75           l



     IV 1,000 mL      03:39:                               ml/hr,           [facility]

[facility]



               00                                 Infuse           



                                                  over: 13.3           



                                                  hr, Route:           



                                                  IV, Dosing           



                                                  Weight           



                                                  127.273           



                                                  kg, Total           



                                                  Volume:           



                                                  1,000,           



                                                  Start           



                                                  date:           



                                                  18           



                                                  22:39:00           



                                                  CDT,           



                                                  Duration:           



                                                  30 day,           



                                                  Stop date:           



                                                  18           



                                                  22:38:00           



                                                  CDT, 2.36,           



                                                  m2             

 

     normal      2018-0      No                       1,000 mL,           [facility]

[facility]



     saline 0.9%      5-04                               Rate: 75           l



     IV 1,000 mL      03:39:                               ml/hr,           [facility]

[facility]



               00                                 Infuse           



                                                  over: 13.3           



                                                  hr, Route:           



                                                  IV, Dosing           



                                                  Weight           



                                                  127.273           



                                                  kg, Total           



                                                  Volume:           



                                                  1,000,           



                                                  Start           



                                                  date:           



                                                  18           



                                                  22:39:00           



                                                  CDT,           



                                                  Duration:           



                                                  30 day,           



                                                  Stop date:           



                                                  18           



                                                  22:38:00           



                                                  CDT, 2.36,           



                                                  m2             

 

     normal      2018-0      No                       1,000 mL,           [facility]

[facility]



     saline 0.9%      5-04                               Rate: 75           l



     IV 1,000 mL      03:39:                               ml/hr,           [facility]

[facility]



               00                                 Infuse           



                                                  over: 13.3           



                                                  hr, Route:           



                                                  IV, Dosing           



                                                  Weight           



                                                  127.273           



                                                  kg, Total           



                                                  Volume:           



                                                  1,000,           



                                                  Start           



                                                  date:           



                                                  18           



                                                  22:39:00           



                                                  CDT,           



                                                  Duration:           



                                                  30 day,           



                                                  Stop date:           



                                                  18           



                                                  22:38:00           



                                                  CDT, 2.36,           



                                                  m2             

 

     Acetaminoph            No                       Notes: Max           

[facility]



     en        5-04                               acetaminop           l



               02:56:                               hen 4000           [facility]



               00                                 mg/day (4           



                                                  gm/day).           



                                                  (Same as:           



                                                  Tylenol           



                                                  Extra           



                                                  Strength)           

 

     Acetaminoph            No                       Notes: Max           

[facility]



     en        5-04                               acetaminop           l



               02:56:                               hen 4000           [facility]



               00                                 mg/day (4           



                                                  gm/day).           



                                                  (Same as:           



                                                  Tylenol           



                                                  Extra           



                                                  Strength)           

 

     Acetaminoph            No                       Notes: Max           

[facility]



     en        5-04                               acetaminop           l



               02:56:                               hen 4000           [facility]



               00                                 mg/day (4           



                                                  gm/day).           



                                                  (Same as:           



                                                  Tylenol           



                                                  Extra           



                                                  Strength)           

 

     Acetaminoph            No                       Notes: Max           

[facility]



     en        5-04                               acetaminop           l



               02:56:                               hen 4000           [facility]



               00                                 mg/day (4           



                                                  gm/day).           



                                                  (Same as:           



                                                  Tylenol           



                                                  Extra           



                                                  Strength)           

 

     Acetaminoph            No                       Notes: Max           

[facility]



     en        5-04                               acetaminop           l



               02:56:                               hen 4000           [place]



               00                                 mg/day (4           



                                                  gm/day).           



                                                  (Same as:           



                                                  Tylenol           



                                                  Extra           



                                                  Strength)           

 

     Acetaminoph            No                       Notes: Max           

[facility]



     en        5-04                               acetaminop           l



               02:56:                               hen 4000           [place]



               00                                 mg/day (4           



                                                  gm/day).           



                                                  (Same as:           



                                                  Tylenol           



                                                  Extra           



                                                  Strength)           

 

     Rocephin            No                       Notes:           [facility]



               -                               (Same As:           l



               02:21:                               Rocephin).           [facility]



               00                                 ***            



                                                  MEDICATION           



                                                  WASTE ***           



                                                  Product           



                                                  Size: 1000           



                                                  mg Product           



                                                  Wasted:           



                                                  _0__ mg           

 

     Rocephin            No                       Notes:           [facility]



               5-                               (Same As:           l



               02:21:                               Rocephin).           [facility]



               00                                 ***            



                                                  MEDICATION           



                                                  WASTE ***           



                                                  Product           



                                                  Size: 1000           



                                                  mg Product           



                                                  Wasted:           



                                                  _0__ mg           

 

     Rocephin            No                       Notes:           [facility]



               5-04                               (Same As:           l



               02:21:                               Rocephin).           [facility]



               00                                 ***            



                                                  MEDICATION           



                                                  WASTE ***           



                                                  Product           



                                                  Size: 1000           



                                                  mg Product           



                                                  Wasted:           



                                                  _0__ mg           

 

     Rocephin            No                       Notes:           [facility]



               5-                               (Same As:           l



               02:21:                               Rocephin).           [facility]



               00                                 ***            



                                                  MEDICATION           



                                                  WASTE ***           



                                                  Product           



                                                  Size: 1000           



                                                  mg Product           



                                                  Wasted:           



                                                  _0__ mg           

 

     Rocephin      2018-0      No                       Notes:           [facility]



               5-04                               (Same As:           l



               02:21:                               Rocephin).           [place]



               00                                 ***            



                                                  MEDICATION           



                                                  WASTE ***           



                                                  Product           



                                                  Size: 1000           



                                                  mg Product           



                                                  Wasted:           



                                                  _0__ mg           

 

     Rocephin      2018-0      No                       Notes:           [facility]



               5-04                               (Same As:           l



               02:21:                               Rocephin).           [facility]



               00                                 ***            



                                                  MEDICATION           



                                                  WASTE ***           



                                                  Product           



                                                  Size: 1000           



                                                  mg Product           



                                                  Wasted:           



                                                  _0__ mg           

 

     Morphine      2018-0      No                       4 mg,           [facility]



               5-04                               Route:           l



               00:05:                               IVP, ONCE,           [place]



                                                Dosing           



                                                  Weight           



                                                  127.273,           



                                                  kg,            



                                                  Priority:           



                                                  STAT,           



                                                  Start           



                                                  date:           



                                                  18           



                                                  19:05:00           



                                                  CDT, Stop           



                                                  date:           



                                                  18           



                                                  19:05:00           



                                                  CDT            

 

     Morphine      2018-0      No                       4 mg,           [facility]



               5-04                               Route:           l



               00:05:                               IVP, ONCE,           [place]



                                                Dosing           



                                                  Weight           



                                                  127.273,           



                                                  kg,            



                                                  Priority:           



                                                  STAT,           



                                                  Start           



                                                  date:           



                                                  18           



                                                  19:05:00           



                                                  CDT, Stop           



                                                  date:           



                                                  18           



                                                  19:05:00           



                                                  CDT            

 

     Morphine      2018-0      No                       4 mg,           [facility]



               5-04                               Route:           l



               00:05:                               IVP, ONCE,           [place]



                                                Dosing           



                                                  Weight           



                                                  127.273,           



                                                  kg,            



                                                  Priority:           



                                                  STAT,           



                                                  Start           



                                                  date:           



                                                  18           



                                                  19:05:00           



                                                  CDT, Stop           



                                                  date:           



                                                  18           



                                                  19:05:00           



                                                  CDT            

 

     Morphine      2018-0      No                       4 mg,           [facility]



               5-04                               Route:           l



               00:05:                               IVP, ONCE,           [place]



                                                Dosing           



                                                  Weight           



                                                  127.273,           



                                                  kg,            



                                                  Priority:           



                                                  STAT,           



                                                  Start           



                                                  date:           



                                                  18           



                                                  19:05:00           



                                                  CDT, Stop           



                                                  date:           



                                                  18           



                                                  19:05:00           



                                                  CDT            

 

     Morphine      2018-0      No                       4 mg,           [facility]



               5-04                               Route:           l



               00:05:                               IVP, ONCE,           [facility]



               00                                 Dosing           



                                                  Weight           



                                                  127.273,           



                                                  kg,            



                                                  Priority:           



                                                  STAT,           



                                                  Start           



                                                  date:           



                                                  18           



                                                  19:05:00           



                                                  CDT, Stop           



                                                  date:           



                                                  18           



                                                  19:05:00           



                                                  CDT            

 

     Morphine      2018-0      No                       4 mg,           [facility]



               5-04                               Route:           l



               00:05:                               IVP, ONCE,           [place]                                 Dosing           



                                                  Weight           



                                                  127.273,           



                                                  kg,            



                                                  Priority:           



                                                  STAT,           



                                                  Start           



                                                  date:           



                                                  18           



                                                  19:05:00           



                                                  CDT, Stop           



                                                  date:           



                                                  18           



                                                  19:05:00           



                                                  CDT            

 

     Benadryl            No                       Notes:           [facility]



               5-03                               (Same as:           l



               23:14:                               Benadryl)           [facility]



                                                               

 

     Benadryl            No                       Notes:           [facility]



               5-03                               (Same as:           l



               23:14:                               Benadryl)           [facility]



                                                               

 

     Benadryl            No                       Notes:           [facility]



               5-03                               (Same as:           l



               23:14:                               Benadryl)           [facility]



                                                               

 

     Benadryl            No                       Notes:           [facility]



               5-03                               (Same as:           l



               23:14:                               Benadryl)           [facility]                                                

 

     Benadryl            No                       Notes:           [facility]



               5-03                               (Same as:           l



               23:14:                               Benadryl)           [place]



                                                               

 

     Benadryl            No                       Notes:           [facility]



               5-03                               (Same as:           l



               23:14:                               Benadryl)           [facility]                                                

 

     Benadryl            No                       Notes:           [facility]



               5-03                               (Same as:           l



               22:56:                               Benadryl)           [place]                                                

 

     Morphine      -0      No                       4 mg, 1           [facility]

a



               5-03                               mL, Route:           l



               22:56:                               IVP, Drug                                            form:           



                                                  SOLN,           



                                                  ONCE,           



                                                  Dosing           



                                                  Weight           



                                                  127.273,           



                                                  kg,            



                                                  Priority:           



                                                  STAT,           



                                                  Start           



                                                  date:           



                                                  18           



                                                  17:56:00           



                                                  CDT, Stop           



                                                  date:           



                                                  18           



                                                  17:56:00           



                                                  CDT            

 

     Benadryl      0      No                       Notes:           [facility]



               5-03                               (Same as:           l



               22:56:                               Benadryl)                                                           

 

     Morphine      -0      No                       4 mg, 1           [facility]

a



               5-03                               mL, Route:           l



               22:56:                               IVP, Drug                                            form:           



                                                  SOLN,           



                                                  ONCE,           



                                                  Dosing           



                                                  Weight           



                                                  127.273,           



                                                  kg,            



                                                  Priority:           



                                                  STAT,           



                                                  Start           



                                                  date:           



                                                  18           



                                                  17:56:00           



                                                  CDT, Stop           



                                                  date:           



                                                  18           



                                                  17:56:00           



                                                  CDT            

 

     Benadryl      0      No                       Notes:           [facility]



               5-03                               (Same as:           l



               22:56:                               Benadryl)           [facility]                                                

 

     Morphine      2018-0      No                       4 mg, 1           [facility]

a



               5-03                               mL, Route:           l



               22:56:                               IVP, Drug                                            form:           



                                                  SOLN,           



                                                  ONCE,           



                                                  Dosing           



                                                  Weight           



                                                  127.273,           



                                                  kg,            



                                                  Priority:           



                                                  STAT,           



                                                  Start           



                                                  date:           



                                                  18           



                                                  17:56:00           



                                                  CDT, Stop           



                                                  date:           



                                                  18           



                                                  17:56:00           



                                                  CDT            

 

     Benadryl      2018-0      No                       Notes:           [facility]



               5-03                               (Same as:           l



               22:56:                               Benadryl)                                                           

 

     Morphine      -0      No                       4 mg, 1           [facility]

a



               5-03                               mL, Route:           l



               22:56:                               IVP, Drug                                            form:           



                                                  SOLN,           



                                                  ONCE,           



                                                  Dosing           



                                                  Weight           



                                                  127.273,           



                                                  kg,            



                                                  Priority:           



                                                  STAT,           



                                                  Start           



                                                  date:           



                                                  18           



                                                  17:56:00           



                                                  CDT, Stop           



                                                  date:           



                                                  18           



                                                  17:56:00           



                                                  CDT            

 

     Benadryl      -0      No                       Notes:           [facility]



               5-03                               (Same as:           l



               22:56:                               Benadryl)                                                           

 

     Morphine      -0      No                       4 mg, 1           [facility]

a



               5-03                               mL, Route:           l



               22:56:                               IVP, Drug                                            form:           



                                                  SOLN,           



                                                  ONCE,           



                                                  Dosing           



                                                  Weight           



                                                  127.273,           



                                                  kg,            



                                                  Priority:           



                                                  STAT,           



                                                  Start           



                                                  date:           



                                                  18           



                                                  17:56:00           



                                                  CDT, Stop           



                                                  date:           



                                                  18           



                                                  17:56:00           



                                                  CDT            

 

     Benadryl      -0      No                       Notes:           [facility]



               5-03                               (Same as:           l



               22:56:                               Benadryl)                                                           

 

     Morphine      -0      No                       4 mg, 1           [facility]

a



               5-03                               mL, Route:           l



               22:56:                               IVP, Drug                                            form:           



                                                  SOLN,           



                                                  ONCE,           



                                                  Dosing           



                                                  Weight           



                                                  127.273,           



                                                  kg,            



                                                  Priority:           



                                                  STAT,           



                                                  Start           



                                                  date:           



                                                  18           



                                                  17:56:00           



                                                  CDT, Stop           



                                                  date:           



                                                  18           



                                                  17:56:00           



                                                  CDT            

 

     OXYCODONE      2017-      Yes                      Take by           [facility]

rs



     HCL/ACETAMI      2-20                               mouth.           ity of



     NOPHEN      10:03:                                              [place]



     (PERCOCET      17                                                Medical



     [facility])                                                        Branch

 

     OXYCODONE      -      Yes                      Take by           [facility]

rs



     HCL/ACETAMI      2-20                               mouth.           ity of



     NOPHEN      04:03:                                              [place]



     (PERCOCET      17                                                Medical



     [facility])                                                        Branch

 

     OXYCODONE      2017      Yes                      Take by           [facility]

rs



     HCL/ACETAMI      2-20                               mouth.           ity of



     NOPHEN      04:03:                                              [place]



     (PERCOCET      17                                                Medical



     [facility])                                                        Branch

 

     OXYCODONE      2017      Yes                      Take by           [facility]

rs



     HCL/ACETAMI      2-20                               mouth.           ity of



     NOPHEN      04:03:                                              [place]



     (PERCOCET      17                                                Medical



     [facility])                                                        Branch

 

     OXYCODONE      2017      Yes                      Take by           [facility]

rs



     HCL/ACETAMI      2-20                               mouth.           ity of



     NOPHEN      04:03:                                              [place]



     (PERCOCET      17                                                Medical



     [facility])                                                        Branch

 

     dicyclomine      -      Yes            20mg      Take 1           [facility]

ers



     (BENTYL) 20      2-20                               tablet by           ity

 of



     mg tablet      00:00:                               mouth 4           [place]



               00                                 (four)           Medical



                                                  times           Branch



                                                  daily.           

 

     proMETHazin      2017      Yes            25mg      Take 1           [facility]

ers



     e 25 mg      2-20                               tablet by           ity of



     tablet      00:00:                               mouth           [place]



               00                                 every 6           Medical



                                                  (six)           Branch



                                                  hours as           



                                                  needed for           



                                                  Nausea and           



                                                  Vomiting           



                                                  (N/V).           

 

     proMETHazin      2017      Yes            25mg      Take 1           [facility]

ers



     e 25 mg      2-20                               tablet by           ity of



     tablet      00:00:                               mouth           [place]



               00                                 every 6           Medical



                                                  (six)           Branch



                                                  hours as           



                                                  needed for           



                                                  Nausea and           



                                                  Vomiting           



                                                  (N/V).           

 

     proMETHazin      2017      Yes            25mg      Take 1           [facility]

ers



     e 25 mg      2-20                               tablet by           ity of



     tablet      00:00:                               mouth           [place]



               00                                 every 6           Medical



                                                  (six)           Branch



                                                  hours as           



                                                  needed for           



                                                  Nausea and           



                                                  Vomiting           



                                                  (N/V).           

 

     dicyclomine      -      Yes            20mg      Take 1           [facility]

ers



     (BENTYL) 20      2-20                               tablet by           ity

 of



     mg tablet      00:00:                               mouth 4           [place]



               00                                 (four)           Medical



                                                  times           Branch



                                                  daily.           

 

     proMETHazin      2017      Yes            25mg      Take 1           [facility]

ers



     e 25 mg      2-20                               tablet by           ity of



     tablet      00:00:                               mouth           [place]



               00                                 every 6           Medical



                                                  (six)           Branch



                                                  hours as           



                                                  needed for           



                                                  Nausea and           



                                                  Vomiting           



                                                  (N/V).           

 

     dicyclomine      -      Yes            20mg      Take 1           [facility]

ers



     (BENTYL) 20      2-20                               tablet by           ity

 of



     mg tablet      00:00:                               mouth 4           [place]



               00                                 (four)           Medical



                                                  times           Branch



                                                  daily.           

 

     proMETHazin      2017      Yes            25mg      Take 1           [facility]

ers



     e 25 mg      2-20                               tablet by           ity of



     tablet      00:00:                               mouth           [place]



               00                                 every 6           Medical



                                                  (six)           Branch



                                                  hours as           



                                                  needed for           



                                                  Nausea and           



                                                  Vomiting           



                                                  (N/V).           

 

     proMETHazin      2017- No             25mg      Take 1           [facility]

vers



     e 25 mg      2-20 -25                          tablet by           ity of



     tablet      00:00: 00:00                          mouth           [place]



               00   :00                           every 6           Medical



                                                  (six)           Branch



                                                  hours as           



                                                  needed for           



                                                  Nausea and           



                                                  Vomiting           



                                                  (N/V).           

 

     dicyclomine      2017- No             20mg      Take 1           [facility]

[facility]



     (BENTYL) 20      2-20 01-15                          tablet by           it

y of



     mg tablet      00:00: 00:00                          mouth 4           [place]

[facility]



               00   :00                           (four)           Medical



                                                  times           Branch



                                                  daily.           

 

     proMETHazin      -0      Yes            25mg      Take 1           [facility]

ers



     e 25 mg      1-04                               tablet by           ity of



     tablet      00:00:                               mouth           [place]



               00                                 every 6           Medical



                                                  (six)           Branch



                                                  hours as           



                                                  needed for           



                                                  Nausea and           



                                                  Vomiting           



                                                  (N/V) for           



                                                  up to 12           



                                                  doses.           

 

     proMETHazin      2017-0      Yes            25mg      Take 1           [facility]

ers



     e 25 mg      1-04                               tablet by           ity of



     tablet      00:00:                               mouth           [place]



               00                                 every 6           Medical



                                                  (six)           Branch



                                                  hours as           



                                                  needed for           



                                                  Nausea and           



                                                  Vomiting           



                                                  (N/V) for           



                                                  up to 12           



                                                  doses.           

 

     proMETHazin      2017-0      Yes            25mg      Take 1           [facility]

ers



     e 25 mg      1-04                               tablet by           ity of



     tablet      00:00:                               mouth           [place]



               00                                 every 6           Medical



                                                  (six)           Branch



                                                  hours as           



                                                  needed for           



                                                  Nausea and           



                                                  Vomiting           



                                                  (N/V) for           



                                                  up to 12           



                                                  doses.           

 

     proMETHazin      2017-0      Yes            25mg      Take 1           [facility]

ers



     e 25 mg      1-04                               tablet by           ity of



     tablet      00:00:                               mouth           [place]



               00                                 every 6           Medical



                                                  (six)           Branch



                                                  hours as           



                                                  needed for           



                                                  Nausea and           



                                                  Vomiting           



                                                  (N/V) for           



                                                  up to 12           



                                                  doses.           

 

     proMETHazin      2017-0      Yes            25mg      Take 1           [facility]

ers



     e 25 mg      1-04                               tablet by           ity of



     tablet      00:00:                               mouth           [place]



               00                                 every 6           Medical



                                                  (six)           Branch



                                                  hours as           



                                                  needed for           



                                                  Nausea and           



                                                  Vomiting           



                                                  (N/V) for           



                                                  up to 12           



                                                  doses.           

 

     proMETHazin      2017-0      Yes            25mg      Take 1           [facility]

ers



     e 25 mg      1-04                               tablet by           ity of



     tablet      00:00:                               mouth           [place]



               00                                 every 6           Medical



                                                  (six)           Branch



                                                  hours as           



                                                  needed for           



                                                  Nausea and           



                                                  Vomiting           



                                                  (N/V) for           



                                                  up to 12           



                                                  doses.           

 

     proMETHazin      2017-0      Yes            25mg      Take 1           [facility]

ers



     e 25 mg      1-04                               tablet by           ity of



     tablet      00:00:                               mouth           [place]



               00                                 every 6           Medical



                                                  (six)           Branch



                                                  hours as           



                                                  needed for           



                                                  Nausea and           



                                                  Vomiting           



                                                  (N/V) for           



                                                  up to 12           



                                                  doses.           

 

     proMETHazin      2017-0      Yes            25mg      Take 1           [facility]

ers



     e 25 mg      1-04                               tablet by           ity of



     tablet      00:00:                               mouth           [place]



               00                                 every 6           Medical



                                                  (six)           Branch



                                                  hours as           



                                                  needed for           



                                                  Nausea and           



                                                  Vomiting           



                                                  (N/V) for           



                                                  up to 12           



                                                  doses.           

 

     proMETHazin      2017-      Yes            25mg      Take 1           [facility]

ers



     e 25 mg      1-04                               tablet by           ity of



     tablet      00:00:                               mouth           [place]



               00                                 every 6           Medical



                                                  (six)           Branch



                                                  hours as           



                                                  needed for           



                                                  Nausea and           



                                                  Vomiting           



                                                  (N/V) for           



                                                  up to 12           



                                                  doses.           

 

     proMETHazin      -2022- No             25mg      Take 1           [facility]

vers



     e 25 mg      1-04 05-11                          tablet by           ity of



     tablet      00:00: 00:00                          mouth           [place]



               00   :00                           every 6           Medical



                                                  (six)           Branch



                                                  hours as           



                                                  needed for           



                                                  Nausea and           



                                                  Vomiting           



                                                  (N/V) for           



                                                  up to 12           



                                                  doses.           

 

     Pantoprazol Pantoprazol           No             1{table QD   Pantoprazo   

        



     e Sodium 40 e Sodium 40                          t}        le Sodium       

    



     MG   MG                                      40 MG           

 

     Macrobid Macrobid           No             1{capsu BID  Macrobid           



     100  MG                          le_with      100 MG           



                                        _food}                     

 

     SEROquel 25 SEROquel 25           No             1{table      SEROquel     

      



     MG   MG                            t}        25 MG           

 

     SEROquel 25 SEROquel 25           No             1{table      SEROquel     

      



     MG   MG                            t}        25 MG           

 

     Macrobid Macrobid           No             1{capsu BID  Macrobid           



     100  MG                          le_with      100 MG           



                                        _food}                     

 

     Pantoprazol Pantoprazol           No             1{table QD   Pantoprazo   

        



     e Sodium 40 e Sodium 40                          t}        le Sodium       

    



     MG   MG                                      40 MG           

 

     Tylenol Tylenol           No             1{table QID  Tylenol           



     with with                          t_as_ne      with           



     Codeine #4 Codeine #4                          eded}      Codeine #4       

    



     300-60 -60 MG                                    300-60 MG           

 

     Collagenase Collagenase           No             1{appli QD   Collagenas   

        



     250 UNIT/ UNIT/GM                          cation_      e 250        

   



                                        to_affe      UNIT/GM           



                                        cted_ar                     



                                        ea}                      

 

     SEROquel 25 SEROquel 25           No             1{table      SEROquel     

      



     MG   MG                            t}        25 MG           

 

     Macrobid Macrobid           No             1{capsu BID  Macrobid           



     100  MG                          le_with      100 MG           



                                        _food}                     

 

     Tylenol Tylenol           Yes  [facility] [place]                1 tablet           [facility]

[facility]



     with with                                    as needed           [facility]



     Codeine #4 Codeine #4                                                   - C

HI



                                                                 [facility]

 

     Macrobid Macrobid           Yes  [place]                1 capsule          

 Common



                                                  with food           [facility]

 

     Pantoprazol Pantoprazol           Yes  [facility] [place]                1 tablet     

      Common



     e Sodium e Sodium                                                   [facility]

 

     Collagenase Collagenase           Yes  [place]                1            

  Common



                                                  applicatio           [facility]



                                                  n to           - [facility]



                                                  affected           [facility]

 

     Pantoprazol Pantoprazol           No             1{table QD   Pantoprazo   

        



     e Sodium 40 e Sodium 40                          t}        le Sodium       

    



     MG   MG                                      40 MG           

 

     Tylenol Tylenol           No             1{table QID  Tylenol           



     with with                          t_as_ne      with           



     Codeine #4 Codeine #4                          eded}      Codeine #4       

    



     300-60 -60 MG                                    300-60 MG           

 

     Collagenase Collagenase           No             1{appli QD   [facility]   

        



     250 UNIT/ UNIT/GM                          cation_      e 250        

   



                                        to_affe      UNIT/GM           



                                        cted_ar                     



                                        ea}                      

 

     Tylenol Tylenol           No             1{table QID  Tylenol           



     with with                          t_as_ne      with           



     Codeine #4 Codeine #4                          eded}      Codeine #4       

    



     300-60 -60 MG                                    300-60 MG           

 

     Pantoprazol Pantoprazol           No             1{table QD   Pantoprazo   

        



     e Sodium 40 e Sodium 40                          t}        le Sodium       

    



     MG   MG                                      40 MG           

 

     SEROquel 25 SEROquel 25           No             1{table      SEROquel     

      



     MG   MG                            t}        25 MG           

 

     Collagenase Collagenase           No             1{appli QD   Collagenas   

        



     250 UNIT/ UNIT/GM                          cation_      e 250        

   



                                        to_affe      UNIT/GM           



                                        cted_ar                     



                                        ea}                      

 

     Macrobid Macrobid           No             1{capsu BID  Macrobid           



     100  MG                          le_with      100 MG           



                                        _food}                     

 

     Tylenol Tylenol           No             1{table QID                 



     with with                          t_as_ne                     



     Codeine #4 Codeine #4                          eded}                     



     300-60 -60 MG                                                   

 

     Pantoprazol Pantoprazol           No             1{table QD                

  



     e Sodium 40 e Sodium 40                          t}                       



     MG   MG                                                     

 

     SEROquel 25 SEROquel 25           No             1{table                   

  



     MG   MG                            t}                       

 

     Collagenase Collagenase           No             1{appli QD                

  



     250 UNIT/ UNIT/GM                          cation_                   

  



                                        to_affe                     



                                        cted_ar                     



                                        ea}                      

 

     Macrobid Macrobid           No             1{capsu BID                 



     100  MG                          le_with                     



                                        _food}                     

 

     SEROquel 25 SEROquel 25           No             1{table      SEROquel     

      



     MG   MG                            t}        25 MG           

 

     Macrobid Macrobid           No             1{capsu BID  Macrobid           



     100  MG                          le_with      100 MG           



                                        _food}                     

 

     Collagenase Collagenase           No             1{appli QD   Collagenas   

        



     250 UNIT/ UNIT/GM                          cation_      e 250        

   



                                        to_affe      UNIT/GM           



                                        cted_ar                     



                                        ea}                      

 

     Pantoprazol Pantoprazol           No             1{table QD   Pantoprazo   

        



     e Sodium 40 e Sodium 40                          t}        le Sodium       

    



     MG   MG                                      40 MG           

 

     Tylenol Tylenol           No             1{table QID  Tylenol           



     with with                          t_as_ne      with           



     Codeine #4 Codeine #4                          eded}      Codeine #4       

    



     300-60 -60 MG                                    300-60 MG           

 

     Tylenol Tylenol           No             1{table QID  Tylenol           



     with with                          t_as_ne      with           



     Codeine #4 Codeine #4                          eded}      Codeine #4       

    



     300-60 -60 MG                                    300-60 MG           

 

     Collagenase Collagenase           No             1{appli QD   Collagenas   

        



     250 UNIT/ UNIT/GM                          cation_      e 250        

   



                                        to_affe      UNIT/GM           



                                        cted_ar                     



                                        ea}                      

 

     Pantoprazol Pantoprazol           No             1{table QD   Pantoprazo   

        



     e Sodium 40 e Sodium 40                          t}        le Sodium       

    



     MG   MG                                      40 MG           

 

     Macrobid Macrobid           No             1{capsu BID  Macrobid           



     100  MG                          le_with      100 MG           



                                        _food}                     

 

     SEROquel 25 SEROquel 25           No             1{table      SEROquel     

      



     MG   MG                            t}        25 MG           

 

     Tylenol Tylenol           No             1{table QID  Tylenol           



     with with                          t_as_ne      with           



     Codeine #4 Codeine #4                          eded}      Codeine #4       

    



     300-60 -60 MG                                    300-60 MG           

 

     Collagenase Collagenase           No             1{appli QD   Collagenas   

        



     250 UNIT/ UNIT/GM                          cation_      e 250        

   



                                        to_affe      UNIT/GM           



                                        cted_ar                     



                                        ea}                      







Immunizations







           Ordered    Filled Immunization Date       Status     Comments   [facility]

e



           Immunization Name Name                                        

 

           SARS-COV-2 COVID-19            2021 Completed             Un[facility]

[facility] of



           [facility], 6MO-5YRS,            00:00:00                         [facility]



           0.25ML VACCINE                                             Branch

 

           SARS-COV-2 COVID-19            2021 Completed             Unive

rsity of



           [facility], 6MO-5YRS,            00:00:00                         [facility]



           0.25ML VACCINE                                             Branch

 

           SARS-COV-2 COVID-19            2021 Completed             Unive

rsity of



           [facility], 6MO-5YRS,            00:00:00                         [place] 

[facility]



           0.25ML VACCINE                                             Branch

 

           SARS-COV-2 COVID-19            2021 Completed             Unive

rsity of



           MODER[facility], 6MO-5YRS,            00:00:00                         [facility]



           0.25ML VACCINE                                             Branch

 

           SARS-COV-2 COVID-19            2021 Completed             Unive

rsity of



           MODER[facility], 6MO-5YRS,            00:00:00                         [facility]



           0.25ML VACCINE                                             Branch

 

           SARS-COV-2 COVID-19            2021 Completed             Unive

rsity of



           [facility], 6MO-5YRS,            00:00:00                         [facility]



           0.25ML VACCINE                                             Branch

 

           SARS-COV-2 COVID-19            2021 Completed             Unive

rsity of



           [facility], 6MO-5YRS,            00:00:00                         [facility]



           0.25ML VACCINE                                             Branch

 

           SARS-COV-2 COVID-19            2021 Completed             Unive

rsity of



           [facility]ER[facility], 6MO-5YRS,            00:00:00                         [facility]



           0.25ML VACCINE                                             Branch

 

           SARS-COV-2 COVID-19            2021 Completed             Unive

rsity of



           MODER[facility], 6MO-5YRS,            00:00:00                         [facility]



           0.25ML VACCINE                                             Branch

 

           SARS-COV-2 COVID-19            2021 Completed             Unive

rsity of



           [facility], 6MO-5YRS,            00:00:00                         [facility]



           0.25ML VACCINE                                             Branch

 

           SARS-COV-2 COVID-19            2021 Completed             Unive

rsity of



           [facility], 6MO-5YRS,            00:00:00                         [facility]



           0.25ML VACCINE                                             Branch

 

           SARS-COV-2 COVID-19            2021 Completed             Unive

rsity of



           MODERNA, 6MO-5YRS,            00:00:00                         [facility]



           0.25ML VACCINE                                             Branch

 

           SARS-COV-2 COVID-19            2021 Completed             Unive

rsity of



           MODERNA, 6MO-5YRS,            00:00:00                         [facility]



           0.25ML VACCINE                                             Branch

 

           SARS-COV-2 COVID-19            2021 Completed             Unive

rsity of



           MODERNA, 6MO-5YRS,            00:00:00                         [facility]



           0.25ML VACCINE                                             Branch

 

           SARS-COV-2 COVID-19            2021 Completed             Unive

rsity of



           MODERNA, 6MO-5YRS,            00:00:00                         [facility]



           0.25ML VACCINE                                             Branch

 

           SARS-COV-2 COVID-19            2021 Completed             Unive

rsity of



           MODERNA, 6MO-5YRS,            00:00:00                         [facility]



           0.25ML VACCINE                                             Branch

 

           SARS-COV-2 COVID-19            2021 Completed             Unive

rsity of



           MODERNA, 6MO-5YRS,            00:00:00                         [facility]



           0.25ML VACCINE                                             Branch

 

           SARS-COV-2 COVID-19            2021 Completed             Unive

rsity of



           MODERNA, 6MO-5YRS,            00:00:00                         [place] 

[facility]



           0.25ML VACCINE                                             Branch

 

           SARS-COV-2 COVID-19            2021 Completed             Unive

rsity of



           MODERNA, 6MO-5YRS,            00:00:00                         [place] 

[facility]



           0.25ML VACCINE                                             Branch

 

           SARS-COV-2 COVID-19            2021 Completed             Unive

rsity of



           MODERNA, 6MO-5YRS,            00:00:00                         [place] 

[facility]



           0.25ML VACCINE                                             Branch

 

           SARS-COV-2 COVID-19            2021 Completed             Unive

rsity of



           MODERNA, 6MO-5YRS,            00:00:00                         [place] 

[facility]



           0.25ML VACCINE                                             Branch

 

           SARS-COV-2 COVID-19            2021 Completed             Unive

rsity of



           MODERNA, 6MO-5YRS,            00:00:00                         [place] 

[facility]



           0.25ML VACCINE                                             Branch

 

           SARS-COV-2 COVID-19            2021 Completed             Unive

rsity of



           MOD[facility], 6MO-5YRS,            00:00:00                         [place] 

[facility]



           0.25ML VACCINE                                             Branch

 

           SARS-COV-2 COVID-19            2021 Completed             Unive

rsity of



           MODERNA, 6MO-5YRS,            00:00:00                         [place] 

[facility]



           0.25ML VACCINE                                             Branch

 

           SARS-COV-2 COVID-19            2021 Completed             Unive

rsity of



           MODERNA, 6MO-5YRS,            00:00:00                         [facility]



           0.25ML VACCINE                                             Branch

 

           SARS-COV-2 COVID-19            2021 Completed             Unive

rsity of



           MODERNA, 6MO-5YRS,            00:00:00                         [facility]



           0.25ML VACCINE                                             Branch

 

           SARS-COV-2 COVID-19            2021 Completed             Unive

rsity of



           MODERNA, 6MO-5YRS,            00:00:00                         [facility]



           0.25ML VACCINE                                             Branch

 

           SARS-COV-2 COVID-19            2021 Completed             Unive

rsity of



           MODERNA, 6MO-5YRS,            00:00:00                         [facility]



           0.25ML VACCINE                                             Branch

 

           SARS-COV-2 COVID-19            2021 Completed             Unive

rsity of



           MODERNA, 6MO-5YRS,            00:00:00                         [facility]



           0.25ML VACCINE                                             Branch

 

           SARS-COV-2 COVID-19            2021 Completed             Unive

rsity of



           MODERNA, 6MO-5YRS,            00:00:00                         [place] 

[facility]



           0.25ML VACCINE                                             Branch

 

           SARS-COV-2 COVID-19            2021 Completed             Unive

rsity of



           MODERNA, 6MO-5YRS,            00:00:00                         [place] 

[facility]



           0.25ML VACCINE                                             Branch

 

           SARS-COV-2 COVID-19            2021 Completed             Unive

rsity of



           MODERNA, 6MO-5YRS,            00:00:00                         [facility]



           0.25ML VACCINE                                             Branch

 

           SARS-COV-2 COVID-19            2021 Completed             Unive

rsity of



           MODERNA, 6MO-5YRS,            00:00:00                         [facility]



           0.25ML VACCINE                                             Branch

 

           SARS-COV-2 COVID-19            2021 Completed             Unive

rsity of



           MODERNA, 6MO-5YRS,            00:00:00                         [place] 

[facility]



           0.25ML VACCINE                                             Branch

 

           SARS-COV-2 COVID-19            2021 Completed             Unive

rsity of



           MODERNA, 6MO-5YRS,            00:00:00                         [place] 

[facility]



           0.25ML VACCINE                                             Branch

 

           SARS-COV-2 COVID-19            2021 Completed             Unive

rsity of



           MODERNA, 6MO-5YRS,            00:00:00                         [place] 

[facility]



           0.25ML VACCINE                                             Branch

 

           SARS-COV-2 COVID-19            2021 Completed             Unive

rsity of



           MODERNA, 6MO-5YRS,            00:00:00                         [place] 

[facility]



           0.25ML VACCINE                                             Branch

 

           SARS-COV-2 COVID-19            2021 Completed             Unive

rsity of



           MODERNA, 6MO-5YRS,            00:00:00                         [place] 

[facility]



           0.25ML VACCINE                                             Branch

 

           SARS-COV-2 COVID-19            2021 Completed             Unive

rsity of



           MODERNA, 6MO-5YRS,            00:00:00                         [place] 

[facility]



           0.25ML VACCINE                                             Branch

 

           SARS-COV-2 COVID-19            2021 Completed             Unive

rsity of



           MODERNA, 6MO-5YRS,            00:00:00                         [place] 

[facility]



           0.25ML VACCINE                                             Branch

 

           SARS-COV-2 COVID-19            2021 Completed             Unive

rsity of



           MODERNA, 6MO-5YRS,            00:00:00                         [place] 

[facility]



           0.25ML VACCINE                                             Branch

 

           SARS-COV-2 COVID-19            2021 Completed             Unive

rsity of



           MODERNA, 6MO-5YRS,            00:00:00                         [place] 

[facility]



           0.25ML VACCINE                                             Branch

 

           SARS-COV-2 COVID-19            2021 Completed             Unive

rsity of



           MODERNA, 6MO-5YRS,            00:00:00                         [place] 

[facility]



           0.25ML VACCINE                                             Branch

 

           SARS-COV-2 COVID-19            2021 Completed             Unive

rsity of



           MODERNA, 6MO-5YRS,            00:00:00                         [place] 

[facility]



           0.25ML VACCINE                                             Branch

 

           SARS-COV-2 COVID-19            2021 Completed             Unive

rsity of



           MODERNA, 6MO-5YRS,            00:00:00                         [place] 

[facility]



           0.25ML VACCINE                                             Branch

 

           SARS-COV-2 COVID-19            2021 Completed             Unive

rsity of



           MODERNA, 6MO-5YRS,            00:00:00                         [place] 

[facility]



           0.25ML VACCINE                                             Branch

 

           SARS-COV-2 COVID-19            2021 Completed             Unive

rsity of



           MODERNA, 6MO-5YRS,            00:00:00                         [place] 

[facility]



           0.25ML VACCINE                                             Branch

 

           SARS-COV-2 COVID-19            2021 Completed             Unive

rsity of



           MODERNA, 6MO-5YRS,            00:00:00                         [place] 

[facility]



           0.25ML VACCINE                                             Branch

 

           SARS-COV-2 COVID-19            2020 Completed             Unive

rsity of



           MODERNA 12+ YRS            00:00:00                         [place] [facility]

[facility]



           VACCINE                                                Branch

 

           SARS-COV-2 COVID-19            2020 Completed             Unive

rsity of



           MODERNA 12+ YRS            00:00:00                         [place] [facility]

[facility]



           VACCINE                                                Branch

 

           SARS-COV-2 COVID-19            2020 Completed             Unive

rsity of



           MODERNA 12+ YRS            00:00:00                         [place] [facility]

[facility]



           VACCINE                                                Branch

 

           SARS-COV-2 COVID-19            2020 Completed             Unive

rsity of



           MODERNA 12+ YRS            00:00:00                         [place] [facility]

[facility]



           VACCINE                                                Branch

 

           SARS-COV-2 COVID-19            2020 Completed             Unive

rsity of



           MODERNA 12+ YRS            00:00:00                         [place] [facility]

[facility]



           VACCINE                                                Branch

 

           SARS-COV-2 COVID-19            2020 Completed             Unive

rsity of



           MODERNA 12+ YRS            00:00:00                         [place] [facility]

[facility]



           VACCINE                                                Branch

 

           SARS-COV-2 COVID-19            2020 Completed             Unive

rsity of



           MODERNA 12+ YRS            00:00:00                         [place] [facility]

[facility]



           VACCINE                                                Branch

 

           SARS-COV-2 COVID-19            2020 Completed             Unive

rsity of



           MODERNA 12+ YRS            00:00:00                         [place] [facility]

[facility]



           VACCINE                                                Branch

 

           SARS-COV-2 COVID-19            2020 Completed             Unive

rsity of



           MODERNA 12+ YRS            00:00:00                         [place] [facility]

[facility]



           VACCINE                                                Branch

 

           SARS-COV-2 COVID-19            2020 Completed             Unive

rsity of



           MODERNA 12+ YRS            00:00:00                         [place] [facility]

[facility]



           VACCINE                                                Branch

 

           SARS-COV-2 COVID-19            2020 Completed             Unive

rsity of



           MODERNA 12+ YRS            00:00:00                         [place] [facility]

[facility]



           VACCINE                                                Branch

 

           SARS-COV-2 COVID-19            2020 Completed             Unive

rsity of



           MODERNA 12+ YRS            00:00:00                         [place] [facility]

[facility]



           VACCINE                                                Branch

 

           SARS-COV-2 COVID-19            2020 Completed             Unive

rsity of



           MODERNA 12+ YRS            00:00:00                         [place] [facility]

[facility]



           VACCINE                                                Branch

 

           SARS-COV-2 COVID-19            2020 Completed             Unive

rsity of



           MODERNA 12+ YRS            00:00:00                         [place] [facility]

[facility]



           VACCINE                                                Branch

 

           SARS-COV-2 COVID-19            2020 Completed             Unive

rsity of



           MODERNA 12+ YRS            00:00:00                         [place] [facility]

[facility]



           VACCINE                                                Branch

 

           SARS-COV-2 COVID-19            2020 Completed             Unive

rsity of



           MODERNA 12+ YRS            00:00:00                         [place] [facility]

[facility]



           VACCINE                                                Branch

 

           SARS-COV-2 COVID-19            2020 Completed             Unive

rsity of



           MODERNA 12+ YRS            00:00:00                         [place] [facility]

[facility]



           VACCINE                                                Branch

 

           SARS-COV-2 COVID-19            2020 Completed             Unive

rsity of



           MODERNA 12+ YRS            00:00:00                         [place] [facility]

[facility]



           VACCINE                                                Branch

 

           SARS-COV-2 COVID-19            2020 Completed             Unive

rsity of



           MODERNA 12+ YRS            00:00:00                         [place] [facility]

[facility]



           VACCINE                                                Branch

 

           SARS-COV-2 COVID-19            2020 Completed             Unive

rsity of



           MODERNA 12+ YRS            00:00:00                         [place] [facility]

[facility]



           VACCINE                                                Branch

 

           SARS-COV-2 COVID-19            2020 Completed             Unive

rsity of



           MODERNA 12+ YRS            00:00:00                         [place] [facility]

[facility]



           VACCINE                                                Branch

 

           SARS-COV-2 COVID-19            2020 Completed             Unive

rsity of



           MODERNA 12+ YRS            00:00:00                         [place] [facility]

ical



           VACCINE                                                Branch

 

           SARS-COV-2 COVID-19            2020 Completed             [facility]

[facility] of



           MODERNA 12+ YRS            00:00:00                         [facility]

[facility]



           VACCINE                                                [facility]

 

           SARS-COV-2 COVID-19            2020 Completed             [facility]

rsity of



           MODERNA 12+ YRS            00:00:00                         [facility]

[facility]



           VACCINE                                                [facility]

 

           SARS-COV-2 COVID-19            2020 Completed             [facility]

rsity of



           MODERNA 12+ YRS            00:00:00                         [facility]

[facility]



           VACCINE                                                [facility]

 

           SARS-COV-2 COVID-19            2020 Completed             [facility]

rsity of



           MODERNA 12+ YRS            00:00:00                         [facility]

[facility]



           VACCINE                                                [facility]

 

           SARS-COV-2 COVID-19            2020 Completed             [facility]

rsity of



           MODERNA 12+ YRS            00:00:00                         [facility]

[facility]



           VACCINE                                                [facility]

 

           SARS-COV-2 COVID-19            2020 Completed             [facility]

rsity of



           MODERNA 12+ YRS            00:00:00                         [facility]

[facility]



           VACCINE                                                [facility]

 

           SARS-COV-2 COVID-19            2020 Completed             [facility]

rsity of



           MODERNA 12+ YRS            00:00:00                         [facility]

[facility]



           VACCINE                                                [facility]

 

           SARS-COV-2 COVID-19            2020 Completed             [facility]

rsity of



           MODERNA 12+ YRS            00:00:00                         [facility]

[facility]



           VACCINE                                                [facility]

 

           SARS-COV-2 COVID-19            2020 Completed             [facility]

rsity of



           MODERNA 12+ YRS            00:00:00                         [facility]

[facility]



           VACCINE                                                [facility]

 

           SARS-COV-2 COVID-19            2020 Completed             [facility]

rsity of



           MODERNA 12+ YRS            00:00:00                         [facility]

[facility]



           VACCINE                                                [facility]

 

           SARS-COV-2 COVID-19            2020 Completed             [facility]

rsity of



           MODERNA 12+ YRS            00:00:00                         [facility]

[facility]



           VACCINE                                                [facility]

 

           SARS-COV-2 COVID-19            2020 Completed             [facility]

rsity of



           MODERNA 12+ YRS            00:00:00                         [facility]

[facility]



           VACCINE                                                [facility]

 

           SARS-COV-2 COVID-19            2020 Completed             [facility]

rsity of



           MODERNA 12+ YRS            00:00:00                         [facility]

[facility]



           VACCINE                                                [facility]

 

           SARS-COV-2 COVID-19            2020 Completed             [facility]

rsity of



           MODERNA 12+ YRS            00:00:00                         [facility]

[facility]



           VACCINE                                                [facility]

 

           SARS-COV-2 COVID-19            2020 Completed             [facility]

rsity of



           MODERNA 12+ YRS            00:00:00                         [facility]

[facility]



           VACCINE                                                [facility]

 

           SARS-COV-2 COVID-19            2020 Completed             [facility]

rsity of



           MODERNA 12+ YRS            00:00:00                         [facility]

[facility]



           VACCINE                                                [facility]

 

           SARS-COV-2 COVID-19            2020 Completed             [facility]

rsity of



           MODERNA 12+ YRS            00:00:00                         [facility]

[facility]



           VACCINE                                                [facility]

 

           SARS-COV-2 COVID-19            2020 Completed             [facility]

rsity of



           MODERNA 12+ YRS            00:00:00                         [facility]

[facility]



           VACCINE                                                [facility]

 

           SARS-COV-2 COVID-19            2020 Completed             [facility]

rsity of



           MODERNA 12+ YRS            00:00:00                         [facility]

[facility]



           VACCINE                                                [facility]

 

           SARS-COV-2 COVID-19            2020 Completed             [facility]

rsity of



           MODERNA 12+ YRS            00:00:00                         [facility]

[facility]



           VACCINE                                                [facility]

 

           SARS-COV-2 COVID-19            2020 Completed             [facility]

rsity of



           MODERNA 12+ YRS            00:00:00                         [facility]

[facility]



           VACCINE                                                [facility]

 

           SARS-COV-2 COVID-19            2020 Completed             [facility]

rsity of



           MODERNA 12+ YRS            00:00:00                         [facility]

[facility]



           VACCINE                                                [facility]

 

           SARS-COV-2 COVID-19            2020 Completed             [facility]

rsity of



           MODERNA 12+ YRS            00:00:00                         [facility]

[facility]



           VACCINE                                                [facility]

 

           SARS-COV-2 COVID-19            2020 Completed             [facility]

rsity of



           MODERNA 12+ YRS            00:00:00                         [facility]

[facility]



           VACCINE                                                [facility]

 

           SARS-COV-2 COVID-19            2020 Completed             [facility]

rsity of



           MODERNA 12+ YRS            00:00:00                         [facility]

[facility]



           VACCINE                                                [facility]

 

           SARS-COV-2 COVID-19            2020 Completed             [facility]

rsity of



           MODERNA 12+ YRS            00:00:00                         [facility]

[facility]



           VACCINE                                                [facility]

 

           SARS-COV-2 COVID-19            2020 Completed             [facility]

rsity of



           MODERNA 12+ YRS            00:00:00                         [facility]

[facility]



           VACCINE                                                [facility]

 

           SARS-COV-2 COVID-19            2020 Completed             [facility]

rsity of



           MODERNA 12+ YRS            00:00:00                         [facility]

[facility]



           VACCINE                                                [facility]

 

           SARS-COV-2 COVID-19            2020 Completed             [facility]

[facility] of



           MODERNA 12+ YRS            00:00:00                         [facility]

[facility]



           VACCINE                                                [facility]

 

           SARS-COV-2 COVID-19            2020 Completed             [facility]

[facility] of



           MODERNA 12+ YRS            00:00:00                         [facility]

[facility]



           VACCINE                                                [facility]

 

           SARS-COV-2 COVID-19            2020 Completed             [facility]

rsity of



           MODERNA 12+ YRS            00:00:00                         [facility]

[facility]



           VACCINE                                                [facility]

 

           SARS-COV-2 COVID-19            2020 Completed             [facility]

[facility] of



           MODERNA 12+ YRS            00:00:00                         [facility]

[facility]



           VACCINE                                                [facility]

 

           SARS-COV-2 COVID-19            2020 Completed             [facility]

[facility] of



           MODERNA 12+ YRS            00:00:00                         [facility]

[facility]



           VACCINE                                                [facility]

 

           SARS-COV-2 COVID-19            2020 Completed             [facility]

[facility] of



           MODERNA 12+ YRS            00:00:00                         [facility]

[facility]



           VACCINE                                                [facility]

 

           SARS-COV-2 COVID-19            2020 Completed             [facility]

[facility] of



           MODERNA 12+ YRS            00:00:00                         [facility]

[facility]



           VACCINE                                                [facility]

 

           SARS-COV-2 COVID-19            2020 Completed             [facility]

rsity of



           MODERNA 12+ YRS            00:00:00                         [facility]

[facility]



           VACCINE                                                [facility]

 

           SARS-COV-2 COVID-19            2020 Completed             [facility]

rsity of



           MODERNA 12+ YRS            00:00:00                         [facility]

[facility]



           VACCINE                                                [facility]

 

           SARS-COV-2 COVID-19            2020 Completed             [facility]

[facility] of



           MODERNA 12+ YRS            00:00:00                         [facility]

[facility]



           VACCINE                                                [facility]

 

           SARS-COV-2 COVID-19            2020 Completed             [facility]

[facility] of



           MODERNA 12+ YRS            00:00:00                         [facility]

[facility]



           VACCINE                                                [facility]

 

           SARS-COV-2 COVID-19            2020 Completed             [facility]

rs[facility] of



           MODERNA 12+ YRS            00:00:00                         [facility]

[facility]



           VACCINE                                                [facility]

 

           SARS-COV-2 COVID-19            2020 Completed             [facility]

rsity of



           MODERNA 12+ YRS            00:00:00                         [facility]

[facility]



           VACCINE                                                [facility]

 

           SARS-COV-2 COVID-19            2020 Completed             [facility]

rsity of



           MODERNA 12+ YRS            00:00:00                         [facility]

[facility]



           VACCINE                                                [facility]

 

           SARS-COV-2 COVID-19            2020 Completed             [facility]

rs[facility] of



           MODERNA 12+ YRS            00:00:00                         [facility]

[facility]



           VACCINE                                                [facility]

 

           SARS-COV-2 COVID-19            2020 Completed             [facility]

rs[facility] of



           MODERNA 12+ YRS            00:00:00                         [facility]

[facility]



           VACCINE                                                [facility]

 

           SARS-COV-2 COVID-19            2020 Completed             [facility]

rsity of



           MODERNA 12+ YRS            00:00:00                         [facility]

[facility]



           VACCINE                                                [facility]

 

           SARS-COV-2 COVID-19            2020 Completed             [facility]

rs[facility] of



           MODERNA 12+ YRS            00:00:00                         [facility]

[facility]



           VACCINE                                                [facility]

 

           SARS-COV-2 COVID-19            2020 Completed             [facility]

rs[facility] of



           MODERNA 12+ YRS            00:00:00                         [facility]

[facility]



           VACCINE                                                [facility]

 

           SARS-COV-2 COVID-19            2020 Completed             [facility]

rs[facility] of



           MODERNA 12+ YRS            00:00:00                         [facility]

[facility]



           VACCINE                                                [facility]

 

           SARS-COV-2 COVID-19            2020 Completed             [facility]

rsity of



           MODERNA 12+ YRS            00:00:00                         [facility]

[facility]



           VACCINE                                                [facility]

 

           SARS-COV-2 COVID-19            2020 Completed             [facility]

rsity of



           MODERNA 12+ YRS            00:00:00                         [facility]

[facility]



           VACCINE                                                [facility]

 

           SARS-COV-2 COVID-19            2020 Completed             [facility]

rsity of



           MODERNA 12+ YRS            00:00:00                         [facility]

[facility]



           VACCINE                                                [facility]

 

           SARS-COV-2 COVID-19            2020 Completed             [facility]

rsity of



           MODERNA 12+ YRS            00:00:00                         [facility]

[facility]



           VACCINE                                                [facility]

 

           SARS-COV-2 COVID-19            2020 Completed             [facility]

rsity of



           MODERNA 12+ YRS            00:00:00                         [facility]

[facility]



           VACCINE                                                [facility]

 

           SARS-COV-2 COVID-19            2020 Completed             [facility]

rsity of



           MODERNA 12+ YRS            00:00:00                         [facility]

[facility]



           VACCINE                                                [facility]

 

           SARS-COV-2 COVID-19            2020 Completed             [facility]

rsity of



           MODERNA 12+ YRS            00:00:00                         [facility]

[facility]



           VACCINE                                                [facility]

 

           SARS-COV-2 COVID-19            2020 Completed             [facility]

rsity of



           MODERNA 12+ YRS            00:00:00                         [facility]

[facility]



           VACCINE                                                [facility]

 

           SARS-COV-2 COVID-19            2020 Completed             [facility]

rsity of



           MODERNA 12+ YRS            00:00:00                         [facility]

[facility]



           VACCINE                                                [facility]

 

           SARS-COV-2 COVID-19            2020 Completed             [facility]

rsity of



           MODERNA 12+ YRS            00:00:00                         [facility]

[facility]



           VACCINE                                                [facility]

 

           SARS-COV-2 COVID-19            2020 Completed             [facility]

rsity of



           MODERNA 12+ YRS            00:00:00                         [facility]

[facility]



           VACCINE                                                [facility]

 

           SARS-COV-2 COVID-19            2020 Completed             [facility]

rsity of



           MODERNA 12+ YRS            00:00:00                         [facility]

[facility]



           VACCINE                                                [facility]

 

           SARS-COV-2 COVID-19            2020 Completed             [facility]

rsity of



           MODERNA 12+ YRS            00:00:00                         [facility]

[facility]



           VACCINE                                                [facility]

 

           SARS-COV-2 COVID-19            2020 Completed             [facility]

rsity of



           MODERNA 12+ YRS            00:00:00                         [facility]

[facility]



           VACCINE                                                [facility]

 

           SARS-COV-2 COVID-19            2020 Completed             [facility]

rsity of



           MODERNA 12+ YRS            00:00:00                         [facility]

[facility]



           VACCINE                                                [facility]

 

           SARS-COV-2 COVID-19            2020 Completed             [facility]

rsity of



           MODERNA 12+ YRS            00:00:00                         [facility]

[facility]



           VACCINE                                                [facility]

 

           SARS-COV-2 COVID-19            2020 Completed             [facility]

rsity of



           MODERNA 12+ YRS            00:00:00                         [facility]

[facility]



           VACCINE                                                [facility]

 

           SARS-COV-2 COVID-19            2020 Completed             [facility]

rsity of



           MODERNA 12+ YRS            00:00:00                         [facility]

[facility]



           VACCINE                                                [facility]

 

           SARS-COV-2 COVID-19            2020 Completed             [facility]

rsity of



           MODERNA 12+ YRS            00:00:00                         [facility]

[facility]



           VACCINE                                                [facility]

 

           SARS-COV-2 COVID-19            2020 Completed             [facility]

rsity of



           MODERNA 12+ YRS            00:00:00                         [facility]

[facility]



           VACCINE                                                [facility]

 

           SARS-COV-2 COVID-19            2020 Completed             [facility]

rsity of



           MODERNA 12+ YRS            00:00:00                         [facility]

[facility]



           VACCINE                                                [facility]

 

           SARS-COV-2 COVID-19            2020 Completed             [facility]

rsity of



           MODERNA 12+ YRS            00:00:00                         [facility]

[facility]



           VACCINE                                                [facility]

 

           SARS-COV-2 COVID-19            2020 Completed             [facility]

rsity of



           MODERNA 12+ YRS            00:00:00                         [facility]

[facility]



           VACCINE                                                [facility]

 

           SARS-COV-2 COVID-19            2020 Completed             [facility]

rsity of



           MODERNA 12+ YRS            00:00:00                         [facility]

[facility]



           VACCINE                                                [facility]

 

           SARS-COV-2 COVID-19            2020 Completed             [facility]

rsity of



           MODERNA 12+ YRS            00:00:00                         [facility]

[facility]



           VACCINE                                                [facility]

 

           SARS-COV-2 COVID-19            2020 Completed             [facility]

rsity of



           MODERNA 12+ YRS            00:00:00                         [facility]

[facility]



           VACCINE                                                Branch







Vital Signs







             Vital Name   Observation Time Observation Value Comments     Source

 

             Systolic blood 2023 23:00:00 108 mm[Hg]                [facility]

[facility]



             pressure                                            [facility]



                                                                 [facility]

 

             Diastolic blood 2023 23:00:00 64 mm[Hg]                 [facility]

[facility] of



             pressure                                            [facility]



                                                                 Branch

 

             Heart rate   2023 23:00:00 106 /min                  [facility]

[facility]



                                                                 [facility]



                                                                 Branch

 

             Respiratory rate 2023 23:00:00 16 /min                   [facility]

ersity of



                                                                 [facility]



                                                                 Branch

 

             Oxygen saturation 2023 23:00:00 96 /min                   [facility]

versity of



             in Arterial blood                                        [facility]

[facility]



             by Pulse oximetry                                        Branch

 

             Body temperature 2023 19:25:00 36.78 Tiffanie                 [facility]iv

ersity of



                                                                 [facility]



                                                                 Branch

 

             Body height  2023 19:25:00 149.9 cm                  [facility]

ty of



                                                                 [facility]



                                                                 Branch

 

             Body weight  2023 19:25:00 127.007 kg                [facility]

[facility] of



                                                                 [facility]



                                                                 Branch

 

             BMI          2023 19:25:00 56.55 kg/m2               [facility]

ty of



                                                                 [facility]



                                                                 Branch

 

             Systolic blood 2023 23:00:00 119 mm[Hg]                [facility]

[facility] of



             pressure                                            [facility]



                                                                 Branch

 

             Diastolic blood 2023 23:00:00 92 mm[Hg]                 [facility]

rs[facility] of



             pressure                                            [facility]



                                                                 Branch

 

             Heart rate   2023 23:00:00 94 /min                   [facility]

ty of



                                                                 [facility]



                                                                 [facility]anch

 

             Respiratory rate 2023 23:00:00 12 /min                   [facility]

ersity of



                                                                 [facility]



                                                                 Branch

 

             Oxygen saturation 2023 23:00:00 99 /min                   [facility]

vers[facility] of



             in Arterial blood                                        [facility]

[facility]



             by Pulse oximetry                                        Branch

 

             Body temperature 2023 19:36:00 37 Tiffanie                    [facility]

ersity of



                                                                 [facility]



                                                                 Branch

 

             Body height  2023 19:36:00 162.6 cm                  [facility]

ty of



                                                                 [facility]



                                                                 [facility]anch

 

             Body weight  2023 19:36:00 117.935 kg                [facility]

ty of



                                                                 [facility]



                                                                 Branch

 

             BMI          2023 19:36:00 44.63 kg/m2               [facility]

ty of



                                                                 [facility]



                                                                 Branch

 

             Systolic blood 2023 18:21:00 114 mm[Hg]                [facility]

sity of



             pressure                                            [facility]



                                                                 Branch

 

             Diastolic blood 2023 18:21:00 78 mm[Hg]                 [facility]

rs[facility] of



             pressure                                            [facility]



                                                                 Branch

 

             Heart rate   2023 18:21:00 103 /min                  Univers[facility]

ty of



                                                                 [facility]



                                                                 [facility]

 

             Body weight  2023 18:21:00 117.935 kg   per patient  [facility]

[facility]



                                                                 [facility]



                                                                 [facility]

 

             BMI          2023 18:21:00 44.63 kg/m2               [facility]

[facility]



                                                                 [facility]



                                                                 [facility]

 

             Systolic blood 2023-04-10 02:00:00 148 mm[Hg]                [facility]

[facility] of



             pressure                                            [facility]



                                                                 [facility]

 

             Diastolic blood 2023-04-10 02:00:00 87 mm[Hg]                 [facility]

[facility] of



             pressure                                            [facility]



                                                                 [facility]

 

             Heart rate   2023-04-10 02:00:00 92 /min                   [facility]

[facility]



                                                                 [facility]



                                                                 [facility]

 

             Respiratory rate 2023-04-10 02:00:00 18 /min                   [facility]

ers[facility]



                                                                 [facility]



                                                                 [facility]

 

             Oxygen saturation 2023-04-10 02:00:00 99 /min                   [facility]

[facility]



             in Arterial blood                                        [facility]

[facility]



             by Pulse oximetry                                        [facility]anch

 

             Body temperature 2023 22:01:00 36.72 Tiffanie                 [facility]

[facility]



                                                                 [facility]



                                                                 [facility]

 

             Body weight  2023 22:01:00 118.389 kg                [facility]

[facility]



                                                                 [facility]



                                                                 [facility]

 

             BMI          2023 22:01:00 44.80 kg/m2               [facility]

[facility]



                                                                 [facility]



                                                                 [facility]

 

             Systolic blood 2023 01:01:00 140 mm[Hg]                [facility]

[facility] of



             pressure                                            [facility]



                                                                 [facility]

 

             Diastolic blood 2023 01:01:00 86 mm[Hg]                 [facility]

[facility]



             pressure                                            [facility]



                                                                 [facility]

 

             Heart rate   2023 01:01:00 103 /min                  [facility]

[facility]



                                                                 [facility]



                                                                 [facility]

 

             Respiratory rate 2023 01:01:00 19 /min                   [facility]

[facility] of



                                                                 [facility]



                                                                 [facility]

 

             Oxygen saturation 2023 01:01:00 98 /min                   [facility]

[facility]



             in Arterial blood                                        [facility]

[facility]



             by Pulse oximetry                                        [facility]

 

             Body temperature 2023 20:37:00 36.78 Tiffanie                 [facility]

[facility]



                                                                 [facility]



                                                                 [facility]

 

             Body height  2023 20:37:00 162.6 cm                  [facility]

[facility]



                                                                 [facility]



                                                                 [facility]

 

             Body weight  2023 20:37:00 118.389 kg                [facility]

[facility]



                                                                 [facility]



                                                                 [facility]

 

             BMI          2023 20:37:00 44.80 kg/m2               [facility]

[facility]



                                                                 [facility]



                                                                 [facility]

 

             Systolic blood 2023 18:18:00 98 mm[Hg]                 [facility]

[facility] of



             pressure                                            [facility]



                                                                 [facility]

 

             Diastolic blood 2023 18:18:00 52 mm[Hg]                 [facility]

[facility] of



             pressure                                            [facility]



                                                                 [facility]

 

             Heart rate   2023 18:18:00 93 /min                   [facility]

[facility]



                                                                 [facility]



                                                                 [facility]

 

             Body temperature 2023 18:18:00 36.72 Tiffanie                 [facility]

[facility] of



                                                                 [facility]



                                                                 [facility]

 

             Respiratory rate 2023 18:18:00 16 /min                   [facility]

[facility] of



                                                                 [facility]



                                                                 [facility]

 

             Oxygen saturation 2023 18:18:00 93 /min                   [facility]

[facility]



             in Arterial blood                                        [facility]

[facility]



             by Pulse oximetry                                        [facility]

 

             Body height  2023 09:34:00 149.9 cm                  [facility]

[facility]



                                                                 [facility]



                                                                 [facility]

 

             Body weight  2023 09:34:00 118.389 kg                [facility]

[facility]



                                                                 [facility]



                                                                 [facility]

 

             BMI          2023 09:34:00 52.72 kg/m2               [facility]

[facility]



                                                                 [facility]



                                                                 [facility]

 

             Systolic blood 2023 21:30:00 129 mm[Hg]                [facility]

[facility] of



             pressure                                            [facility]



                                                                 [facility]

 

             Diastolic blood 2023 21:30:00 73 mm[Hg]                 [facility]

[facility] of



             pressure                                            [facility]



                                                                 [facility]

 

             Heart rate   2023 21:30:00 96 /min                   [facility]

[facility]



                                                                 [facility]



                                                                 [facility]

 

             Respiratory rate 2023 21:30:00 14 /min                   [facility]

[facility]



                                                                 [facility]



                                                                 [facility]

 

             Oxygen saturation 2023 21:30:00 95 /min                   [facility]

[facility]



             in Arterial blood                                        [facility]

[facility]



             by Pulse oximetry                                        [facility]

 

             Body temperature 2023 20:53:00 36.56 Tiffanie                 [facility]

ers[facility]



                                                                 [facility]



                                                                 [facility]

 

             Body height  2023 09:34:00 149.9 cm                  [facility]

[facility]



                                                                 [facility]



                                                                 [facility]

 

             Body weight  2023 09:34:00 118.389 kg                [facility]

[facility]



                                                                 [facility]



                                                                 [facility]

 

             BMI          2023 09:34:00 52.72 kg/m2               [facility]

[facility]



                                                                 [facility]



                                                                 [facility]

 

             Systolic blood 2023 22:09:00 139 mm[Hg]                [facility]

[facility] of



             pressure                                            [facility]



                                                                 [facility]

 

             Diastolic blood 2023 22:09:00 104 mm[Hg]                [facility]

[facility] of



             pressure                                            [facility]



                                                                 [facility]

 

             Heart rate   2023 22:09:00 93 /min                   [facility]

[facility]



                                                                 [facility]



                                                                 [facility]

 

             Respiratory rate 2023 22:09:00 18 /min                   [facility]

[facility]



                                                                 [facility]



                                                                 [facility]

 

             Oxygen saturation 2023 22:09:00 96 /min                   [facility]

[facility]



             in Arterial blood                                        [facility]

[facility]



             by Pulse oximetry                                        [facility]

 

             Body temperature 2023 19:53:00 36.67 Tiffanie                 [facility]

ers[facility] of



                                                                 [facility]



                                                                 [facility]

 

             Systolic blood 2023 03:00:00 132 mm[Hg]                [facility]

[facility]ity of



             pressure                                            [facility]



                                                                 [facility]

 

             Diastolic blood 2023 03:00:00 89 mm[Hg]                 [facility]

[facility] of



             pressure                                            [facility]



                                                                 [facility]

 

             Heart rate   2023 03:00:00 91 /min                   [facility]

[facility]



                                                                 [facility]



                                                                 [facility]

 

             Respiratory rate 2023 03:00:00 18 /min                   [facility]

[facility] of



                                                                 [facility]



                                                                 [facility]

 

             Oxygen saturation 2023 03:00:00 96 /min                   [facility]

[facility] of



             in Arterial blood                                        [facility]

[facility]



             by Pulse oximetry                                        [facility]anch

 

             Body temperature 2023-02-10 22:12:00 37.06 Tiffanie                 [facility]

ers[facility] of



                                                                 [facility]



                                                                 [facility]

 

             Body weight  2023-02-10 22:12:00 131.543 kg                [facility]

[facility]



                                                                 [facility]



                                                                 [facility]

 

             BMI          2023-02-10 22:12:00 58.57 kg/m2               [facility]

[facility]



                                                                 [facility]



                                                                 [facility]

 

             Systolic blood 2023-02-10 00:30:00 115 mm[Hg]                [facility]

[facility] of



             pressure                                            [facility]



                                                                 [facility]

 

             Diastolic blood 2023-02-10 00:30:00 78 mm[Hg]                 [facility]

[facility] of



             pressure                                            [facility]



                                                                 [facility]

 

             Heart rate   2023-02-10 00:30:00 96 /min                   [facility]

[facility]



                                                                 [facility]



                                                                 [facility]

 

             Respiratory rate 2023-02-10 00:30:00 18 /min                   [facility]

ers[facility] of



                                                                 [facility]



                                                                 [facility]

 

             Oxygen saturation 2023-02-10 00:30:00 95 /min                   [facility]

[facility] of



             in Arterial blood                                        [facility]

[facility]



             by Pulse oximetry                                        [facility]

 

             Body temperature 2023 21:14:00 35.89 Tiffanie                 [facility]

ers[facility] of



                                                                 [facility]



                                                                 [facility]

 

             Body height  2023 21:14:00 149.9 cm                  [facility]

[facility]



                                                                 [facility]



                                                                 [facility]

 

             Body weight  2023 21:14:00 127.461 kg                [facility]

[facility]



                                                                 [facility]



                                                                 [facility]

 

             BMI          2023 21:14:00 56.76 kg/m2               [facility]

[facility]



                                                                 [facility]



                                                                 [facility]

 

             Systolic blood 2023 01:13:00 119 mm[Hg]                [facility]

[facility] of



             pressure                                            [facility]



                                                                 [facility]

 

             Diastolic blood 2023 01:13:00 85 mm[Hg]                 [facility]

rsity of



             pressure                                            [facility]



                                                                 [facility]

 

             Heart rate   2023 01:13:00 97 /min                   [facility]

[facility]



                                                                 [facility]



                                                                 [facility]

 

             Body temperature 2023 01:13:00 36.17 Tiffanie                 [facility]

ers[facility] of



                                                                 [facility]



                                                                 [facility]

 

             Respiratory rate 2023 01:13:00 16 /min                   [facility]

ers[facility] of



                                                                 [facility]



                                                                 [facility]

 

             Oxygen saturation 2023 01:13:00 94 /min                   [facility]

[facility]



             in Arterial blood                                        [facility]

[facility]



             by Pulse oximetry                                        [facility]

 

             Body weight  2023 09:31:00 126.962 kg                [facility]

[facility]



                                                                 [facility]



                                                                 [facility]

 

             BMI          2023 09:31:00 56.53 kg/m2               [facility]

[facility]



                                                                 [facility]



                                                                 [facility]

 

             Systolic blood 2022 18:59:00 125 mm[Hg]                [facility]

[facility] of



             pressure                                            [facility]



                                                                 [facility]

 

             Diastolic blood 2022 18:59:00 84 mm[Hg]                 [facility]

rsity of



             pressure                                            [facility]



                                                                 [facility]

 

             Heart rate   2022 18:59:00 104 /min                  [facility]

[facility]



                                                                 [facility]



                                                                 [facility]

 

             Respiratory rate 2022 18:59:00 18 /min                   [facility]

[facility] of



                                                                 [facility]



                                                                 [facility]

 

             Body weight  2022 18:59:00 127.007 kg                [facility]

[facility]



                                                                 [facility]



                                                                 [facility]

 

             BMI          2022 18:59:00 56.55 kg/m2               [facility]

[facility]



                                                                 [facility]



                                                                 [facility]

 

             Oxygen saturation 2022 18:59:00 98 /min                   [facility]

[facility] of



             in Arterial blood                                        [facility]

[facility]



             by Pulse oximetry                                        [facility]

 

             Systolic blood 2022 19:02:00 142 mm[Hg]                [facility]

[facility]ity of



             pressure                                            [facility]



                                                                 [facility]

 

             Diastolic blood 2022 19:02:00 99 mm[Hg]                 [facility]

rsity of



             pressure                                            [facility]



                                                                 [facility]

 

             Heart rate   2022 19:01:00 91 /min                   [facility]

[facility]



                                                                 [facility]



                                                                 [facility]

 

             Systolic blood 2022 18:00:00 136 mm[Hg]                [facility]

[facility] of



             pressure                                            [facility]



                                                                 [facility]

 

             Diastolic blood 2022 18:00:00 92 mm[Hg]                 [facility]

rsity of



             pressure                                            [facility]



                                                                 [facility]

 

             Heart rate   2022 18:00:00 99 /min                   [facility]

[facility]



                                                                 [facility]



                                                                 [facility]

 

             Body temperature 2022 18:00:00 35.83 Tiffanie                 [facility]

ers[facility] of



                                                                 [facility]



                                                                 [facility]

 

             Respiratory rate 2022 18:00:00 18 /min                   [facility]

ers[facility] of



                                                                 [facility]



                                                                 [facility]

 

             Oxygen saturation 2022 18:00:00 95 /min                   [facility]

[facility]ity of



             in Arterial blood                                        [facility]

[facility]



             by Pulse oximetry                                        [facility]anch

 

             Body weight  2022 10:45:00 135.988 kg                [facility]

[facility]



                                                                 [facility]



                                                                 [facility]

 

             BMI          2022 10:45:00 60.55 kg/m2               [facility]

[facility]



                                                                 [facility]



                                                                 [facility]

 

             Body height  2022 02:02:00 149.9 cm                  [facility]

[facility]



                                                                 [facility]



                                                                 [facility]

 

             Systolic blood 2022 01:11:00 166 mm[Hg]                [facility]

[facility]ity of



             pressure                                            [facility]



                                                                 [facility]

 

             Diastolic blood 2022 01:11:00 93 mm[Hg]                 [facility]

rsity of



             pressure                                            [facility]



                                                                 [facility]

 

             Heart rate   2022 01:09:00 106 /min                  [facility]

[facility]



                                                                 [facility]



                                                                 [facility]

 

             Body temperature 2022 01:09:00 36.89 Tiffanie                 [facility]

ers[facility] of



                                                                 [facility]



                                                                 [facility]

 

             Respiratory rate 2022 01:09:00 20 /min                   [facility]

ers[facility] of



                                                                 [facility]



                                                                 [facility]

 

             Body weight  2022 01:09:00 127.007 kg                [facility]

[facility]



                                                                 [facility]



                                                                 [facility]

 

             BMI          2022 01:09:00 56.55 kg/m2               [facility]

[facility]



                                                                 [facility]



                                                                 [facility]

 

             Oxygen saturation 2022 01:09:00 96 /min                   [facility]i

versity of



             in Arterial blood                                        [facility]

[facility]



             by Pulse oximetry                                        [facility]

 

             Systolic blood 2022-10-30 03:00:00 138 mm[Hg]                [facility]

[facility]ity of



             pressure                                            [facility]



                                                                 [facility]

 

             Diastolic blood 2022-10-30 03:00:00 82 mm[Hg]                 [facility]

rsity of



             pressure                                            [facility]



                                                                 [facility]

 

             Heart rate   2022-10-30 03:00:00 102 /min                  [facility]

[facility] of



                                                                 [facility]



                                                                 [facility]anch

 

             Respiratory rate 2022-10-30 03:00:00 20 /min                   [facility]

[facility]



                                                                 [facility]



                                                                 [facility]anch

 

             Oxygen saturation 2022-10-30 03:00:00 97 /min                   [facility]i

[facility]



             in Arterial blood                                        [facility]

sarah



             by Pulse oximetry                                        Branch

 

             Body temperature 2022-10-30 00:13:00 36.5 Tiffanie                  [facility]

[facility] of



                                                                 [facility]



                                                                 Branch

 

             Body weight  2022-10-30 00:13:00 132.904 kg                [facility]

[facility]



                                                                 [facility]



                                                                 Branch

 

             BMI          2022-10-30 00:13:00 59.18 kg/m2               [facility]

ty of



                                                                 [facility]



                                                                 Branch

 

             Systolic blood 2022-10-23 12:42:00 128 mm[Hg]                [facility]

sity of



             pressure                                            [facility]



                                                                 Branch

 

             Diastolic blood 2022-10-23 12:42:00 83 mm[Hg]                 [facility]ive

[facility]ity of



             pressure                                            [facility]



                                                                 Branch

 

             Heart rate   2022-10-23 12:42:00 111 /min                  [facility]

ty of



                                                                 [facility]



                                                                 Branch

 

             Body temperature 2022-10-23 12:42:00 35.94 Tiffanie                 Univ

ers[facility] of



                                                                 [facility]



                                                                 Branch

 

             Respiratory rate 2022-10-23 12:42:00 18 /min                   [facility]

ers[facility] of



                                                                 [facility]



                                                                 Branch

 

             Oxygen saturation 2022-10-23 12:42:00 95 /min                   [facility]i

[facility]



             in Arterial blood                                        [facility]

[facility]



             by Pulse oximetry                                        Branch

 

             Body weight  2022-10-23 10:22:00 132.995 kg                [facility]

[facility] of



                                                                 [facility]



                                                                 [facility]anch

 

             BMI          2022-10-23 10:22:00 59.22 kg/m2               [facility]

ty of



                                                                 [facility]



                                                                 [facility]anch

 

             Body height  2022-10-21 11:00:00 149.9 cm                  [facility]

ty of



                                                                 [facility]



                                                                 [facility]anch

 

             Systolic blood 2022 17:14:00 130 mm[Hg]                [facility]

sity of



             pressure                                            [facility]



                                                                 [facility]anch

 

             Diastolic blood 2022 17:14:00 83 mm[Hg]                 [facility]

[facility]ity of



             pressure                                            [facility]



                                                                 Branch

 

             Heart rate   2022 17:14:00 100 /min                  [facility]

[facility] of



                                                                 [facility]



                                                                 [facility]anch

 

             Body temperature 2022 17:14:00 36.72 Tiffanie                 [facility]

ers[facility] of



                                                                 [facility]



                                                                 [facility]

 

             Respiratory rate 2022 17:14:00 20 /min                   [facility]

[facility] of



                                                                 [facility]



                                                                 [facility]

 

             Oxygen saturation 2022 17:14:00 96 /min                   [facility]

[facility] of



             in Arterial blood                                        [facility]

[facility]



             by Pulse oximetry                                        Branch

 

             Body height  2022 10:00:00 149.9 cm                  [facility]

[facility] of



                                                                 [facility]



                                                                 [facility]

 

             Body weight  2022 10:00:00 123 kg                    [facility]

[facility] of



                                                                 [facility]



                                                                 [facility]

 

             BMI          2022 10:00:00 54.77 kg/m2               [facility]

ty of



                                                                 [facility]



                                                                 [facility]

 

             Systolic blood 2022 12:40:00 107 mm[Hg]                [facility]

[facility]ity of



             pressure                                            [facility]



                                                                 [facility]anch

 

             Diastolic blood 2022 12:40:00 64 mm[Hg]                 [facility]ive

rsity of



             pressure                                            [facility]



                                                                 [facility]anch

 

             Heart rate   2022 12:40:00 99 /min                   [facility]

[facility] of



                                                                 [facility]



                                                                 [facility]

 

             Body temperature 2022 12:40:00 36.83 Tiffanie                 [facility]iv

ersity of



                                                                 [facility]



                                                                 [facility]

 

             Respiratory rate 2022 12:40:00 18 /min                   [facility]

ers[facility] of



                                                                 [facility]



                                                                 [facility]

 

             Oxygen saturation 2022 12:40:00 95 /min                   [facility]

[facility]



             in Arterial blood                                        [facility]

[facility]



             by Pulse oximetry                                        [facility]anch

 

             Body height  2022 10:00:00 149.9 cm                  [facility]

[facility] of



                                                                 [facility]



                                                                 [facility]

 

             Body weight  2022 10:00:00 123 kg                    [facility]

[facility] of



                                                                 [facility]



                                                                 [facility]

 

             BMI          2022 10:00:00 54.77 kg/m2               [facility]

[facility] of



                                                                 [facility]



                                                                 [facility]

 

             Heart rate   2022 23:00:00 91 /min                   [facility]

[facility] of



                                                                 [facility]



                                                                 [facility]

 

             Oxygen saturation 2022 23:00:00 95 /min                   [facility]

[facility]



             in Arterial blood                                        [facility]

[facility]



             by Pulse oximetry                                        [facility]anch

 

             Systolic blood 2022 22:02:00 134 mm[Hg]                [facility]

[facility]ity of



             pressure                                            [facility]



                                                                 [facility]

 

             Diastolic blood 2022 22:02:00 83 mm[Hg]                 [facility]

rsity of



             pressure                                            [facility]



                                                                 [facility]

 

             Body temperature 2022 21:00:00 36.83 Tiffanie                 [facility]

ers[facility] of



                                                                 [facility]



                                                                 [facility]

 

             Respiratory rate 2022 21:00:00 28 /min                   [facility]

[facility] of



                                                                 [facility]



                                                                 [facility]

 

             Body weight  2022 09:00:00 134.99 kg                 [facility]

[facility] of



                                                                 [facility]



                                                                 [facility]

 

             BMI          2022 09:00:00 60.08 kg/m2               [facility]

ty of



                                                                 [facility]



                                                                 [facility]

 

             Body height  2022 22:22:00 149.9 cm                  [facility]

[facility] of



                                                                 [facility]



                                                                 [facility]

 

             Systolic blood 2022 03:38:00 126 mm[Hg]                [facility]

[facility]ity of



             pressure                                            [facility]



                                                                 [facility]

 

             Diastolic blood 2022 03:38:00 90 mm[Hg]                 [facility]ive

rsity of



             pressure                                            [facility]



                                                                 [facility]anch

 

             Heart rate   2022 03:38:00 97 /min                   [facility]

[facility] of



                                                                 [facility]



                                                                 [facility]

 

             Respiratory rate 2022 03:38:00 18 /min                   [facility]

ers[facility] of



                                                                 [facility]



                                                                 [facility]

 

             Oxygen saturation 2022 03:38:00 97 /min                   [facility]

[facility]



             in Arterial blood                                        [facility]

[facility]



             by Pulse oximetry                                        [facility]anch

 

             Body temperature 2022-07-15 22:09:00 36.94 Tiffanie                 [facility]

ers[facility] of



                                                                 [facility]



                                                                 [facility]

 

             Body height  2022-07-15 22:09:00 149.9 cm                  [facility]

ty of



                                                                 [facility]



                                                                 [facility]

 

             Body weight  2022-07-15 22:09:00 127.007 kg                [facility]

ty of



                                                                 [facility]



                                                                 [facility]anch

 

             BMI          2022-07-15 22:09:00 56.55 kg/m2               [facility]

ty of



                                                                 [facility]



                                                                 [facility]

 

             Systolic blood 2022 11:00:00 143 mm[Hg]                [facility]

[facility]ity of



             pressure                                            [facility]



                                                                 [facility]

 

             Diastolic blood 2022 11:00:00 91 mm[Hg]                 [facility]ive

rsity of



             pressure                                            [facility]



                                                                 [facility]anch

 

             Heart rate   2022 11:00:00 100 /min                  [facility]

ty of



                                                                 [facility]



                                                                 [facility]

 

             Respiratory rate 2022 11:00:00 16 /min                   [facility]

ers[facility] of



                                                                 [facility]



                                                                 [facility]

 

             Oxygen saturation 2022 11:00:00 96 /min                   [facility]i

[facility] of



             in Arterial blood                                        [facility]

[facility]



             by Pulse oximetry                                        [facility]anch

 

             Body temperature 2022 09:55:00 36.89 Tiffanie                 [facility]

ers[facility] of



                                                                 [facility]



                                                                 [facility]

 

             Body height  2022 09:55:00 149.9 cm                  [facility]

[facility] of



                                                                 [facility]



                                                                 [facility]

 

             Body weight  2022 09:55:00 127.007 kg                [facility]

[facility] of



                                                                 [facility]



                                                                 [facility]

 

             BMI          2022 09:55:00 56.55 kg/m2               [facility]

ty of



                                                                 [facility]



                                                                 [facility]

 

             Systolic blood 2022 00:43:00 132 mm[Hg]                [facility]

[facility]ity of



             pressure                                            [facility]



                                                                 [facility]

 

             Diastolic blood 2022 00:43:00 88 mm[Hg]                 Unive

rsity of



             [facility]



                                                                 Branch

 

             Heart rate   2022 00:43:00 107 /min                  [facility]i

ty of



                                                                 [facility]

 

             Body temperature 2022 00:43:00 36.33 Tiffanie                 Univ

ersity of



                                                                 [facility]



                                                                 Branch

 

             Respiratory rate 2022 00:43:00 18 /min                   Univ

ersity of



                                                                 [facility]



                                                                 Branch

 

             Oxygen saturation 2022 00:43:00 95 /min                   [facility]i

[facility] of



             in Arterial blood                                        [facility]

[facility]



             by Pulse oximetry                                        Branch

 

             Body height  2022 11:31:00 149.9 cm                  [facility]i

ty of



                                                                 [facility]

 

             Body weight  2022 11:31:00 127.007 kg                [facility]

ty of



                                                                 [facility]



                                                                 Branch

 

             BMI          2022 11:31:00 56.55 kg/m2               Universi

ty of



                                                                 [facility]



                                                                 Branch

 

             Body temperature 2022 16:10:00 36.22 Tiffanie                 Univ

ersity of



                                                                 [facility]



                                                                 Branch

 

             Respiratory rate 2022 16:10:00 16 /min                   Univ

ersity of



                                                                 [facility]



                                                                 Branch

 

             Oxygen saturation 2022 16:10:00 96 /min                   [facility]i

[facility] of



             in Arterial blood                                        [facility]

[facility]



             by Pulse oximetry                                        Branch

 

             Systolic blood 2022 16:10:00 127 mm[Hg]                Univer

sity of



             [facility]



                                                                 Branch

 

             Diastolic blood 2022 16:10:00 84 mm[Hg]                 Unive

rsity of



             pressure                                            [facility]



                                                                 Branch

 

             Heart rate   2022 16:10:00 98 /min                   [facility]

ty of



                                                                 [facility]



                                                                 [facility]

 

             Body weight  2022 08:54:00 133.494 kg                [facility]

ty of



                                                                 [facility]



                                                                 [facility]anch

 

             BMI          2022 08:54:00 50.52 kg/m2               [facility]i

ty of



                                                                 [facility]



                                                                 Branch

 

             Body height  2022 03:01:00 162.6 cm                  [facility]

ty of



                                                                 [facility]



                                                                 Branch

 

             Systolic blood 2022-06-10 17:03:00 132 mm[Hg]                Univer

sity of



             pressure                                            [facility]



                                                                 [facility]anch

 

             Diastolic blood 2022-06-10 17:03:00 90 mm[Hg]                 Unive

rsity of



             pressure                                            [facility]



                                                                 Branch

 

             Heart rate   2022-06-10 17:03:00 78 /min                   [facility]

ty of



                                                                 [facility]



                                                                 Branch

 

             Body temperature 2022-06-10 17:03:00 35.83 Tiffanie                 Univ

ersity of



                                                                 [facility]



                                                                 Branch

 

             Respiratory rate 2022-06-10 17:03:00 14 /min                   Univ

ersity of



                                                                 [facility]



                                                                 Branch

 

             Oxygen saturation 2022-06-10 17:03:00 93 /min                   Uni

[facility]ity of



             in Arterial blood                                        [facility]

[facility]



             by Pulse oximetry                                        Branch

 

             Body weight  2022-06-10 09:00:00 140.933 kg                [facility]

ty of



                                                                 [facility]



                                                                 [facility]anch

 

             BMI          2022-06-10 09:00:00 62.75 kg/m2               [facility]

ty of



                                                                 [facility]



                                                                 [facility]

 

             Body height  2022 12:47:00 149.9 cm                  [facility]

ty of



                                                                 [facility]



                                                                 [facility]

 

             Systolic blood 2022 20:40:00 125 mm[Hg]                [facility]iver

sity of



             pressure                                            [facility]



                                                                 [facility]anch

 

             Diastolic blood 2022 20:40:00 75 mm[Hg]                 Unive

rsity of



             pressure                                            [facility]



                                                                 Branch

 

             Heart rate   2022 20:40:00 99 /min                   [facility]

ty of



                                                                 [facility]



                                                                 [facility]anch

 

             Body temperature 2022 20:40:00 36.67 Tiffanie                 Univ

ersity of



                                                                 [facility]



                                                                 Branch

 

             Respiratory rate 2022 20:40:00 20 /min                   [facility]iv

ersity of



                                                                 [facility]



                                                                 [facility]anch

 

             Oxygen saturation 2022 20:40:00 93 /min                   Uni

[facility]ity of



             in Arterial blood                                        [facility]

[facility]



             by Pulse oximetry                                        Branch

 

             Body weight  2022 22:13:00 137.485 kg                [facility]

ty of



                                                                 [facility]



                                                                 [facility]anch

 

             BMI          2022 22:13:00 61.22 kg/m2               [facility]

ty of



                                                                 [facility]



                                                                 [facility]

 

             Body height  2022 09:10:00 149.9 cm                  [facility]

ty of



                                                                 [facility]



                                                                 [facility]

 

             Systolic blood 2022 15:49:00 111 mm[Hg]                Univer

sity of



             pressure                                            [facility]



                                                                 Branch

 

             Diastolic blood 2022 15:49:00 76 mm[Hg]                 Unive

rsity of



             pressure                                            [facility]



                                                                 Branch

 

             Heart rate   2022 15:49:00 109 /min                  [facility]

ty of



                                                                 [facility]



                                                                 Branch

 

             Body temperature 2022 15:49:00 36.06 Tiffanie                 [facility]

[facility] of



                                                                 [facility]



                                                                 [facility]

 

             Respiratory rate 2022 15:49:00 18 /min                   [facility]

[facility]



                                                                 [facility]



                                                                 [facility]

 

             Oxygen saturation 2022 15:49:00 92 /min                   [facility]i

[facility]



             in Arterial blood                                        [facility]

[facility]



             by Pulse oximetry                                        Branch

 

             Body weight  2022 10:47:00 139.481 kg                [facility]

[facility] of



                                                                 [facility]



                                                                 [facility]anch

 

             BMI          2022 10:47:00 62.11 kg/m2               [facility]

ty of



                                                                 [facility]



                                                                 [facility]

 

             Body height  2022 06:57:00 149.9 cm                  [facility]

ty of



                                                                 [facility]



                                                                 [facility]

 

             Systolic blood 2022 06:00:00 144 mm[Hg]                [facility]

sity of



             pressure                                            [facility]



                                                                 [facility]anch

 

             Diastolic blood 2022 06:00:00 93 mm[Hg]                 Unive

rsity of



             pressure                                            [facility]



                                                                 [facility]

 

             Heart rate   2022 06:00:00 92 /min                   [facility]

ty of



                                                                 [facility]



                                                                 [facility]

 

             Respiratory rate 2022 06:00:00 22 /min                   [facility]

ers[facility] of



                                                                 [facility]



                                                                 [facility]

 

             Oxygen saturation 2022 04:00:00 94 /min                   [facility]i

[facility]



             in Arterial blood                                        [facility]

[facility]



             by Pulse oximetry                                        Branch

 

             Body temperature 2022 03:45:00 37.06 Tiffanie                 [facility]

[facility] of



                                                                 [facility]



                                                                 [facility]

 

             Body height  2022 03:45:00 149.9 cm                  [facility]

ty of



                                                                 [facility]



                                                                 [facility]

 

             Body weight  2022 03:45:00 127.461 kg                [facility]

[facility] of



                                                                 [facility]



                                                                 [facility]

 

             BMI          2022 03:45:00 56.76 kg/m2               [facility]

ty of



                                                                 [facility]



                                                                 [facility]

 

             Systolic blood 2022 03:18:00 113 mm[Hg]                [facility]

sity of



             pressure                                            [facility]



                                                                 [facility]

 

             Diastolic blood 2022 03:18:00 85 mm[Hg]                 Un[facility]

rs[facility] of



             pressure                                            [facility]



                                                                 [facility]

 

             Heart rate   2022 03:18:00 96 /min                   [facility]

[facility] of



                                                                 [facility]



                                                                 [facility]

 

             Respiratory rate 2022 03:18:00 18 /min                   [facility]

[facility] of



                                                                 [facility]



                                                                 [facility]

 

             Oxygen saturation 2022 03:18:00 93 /min                   [facility]i

[facility]



             in Arterial blood                                        [facility]

[facility]



             by Pulse oximetry                                        Branch

 

             Body temperature 2022 01:01:16 36.89 Tiffanie                 [facility]

[facility] of



                                                                 [facility]



                                                                 [facility]

 

             Body weight  2022 23:13:00 127.461 kg                [facility]

[facility] of



                                                                 [facility]



                                                                 [facility]

 

             BMI          2022 23:13:00 56.76 kg/m2               Universi

ty of



                                                                 [facility]



                                                                 Branch

 

             Systolic blood 2022 21:53:00 142 mm[Hg]                Un[facility]

sity of



             pressure                                            [facility]



                                                                 [facility]

 

             Diastolic blood 2022 21:53:00 88 mm[Hg]                 Unive

rsity of



             pressure                                            [facility]



                                                                 [facility]

 

             Heart rate   2022 21:53:00 96 /min                   [facility]

ty of



                                                                 [facility]



                                                                 [facility]

 

             Body temperature 2022 21:53:00 36.06 Tiffanie                 [facility]

[facility] of



                                                                 [facility]



                                                                 [facility]

 

             Respiratory rate 2022 21:53:00 18 /min                   [facility]

[facility] of



                                                                 [facility]



                                                                 [facility]

 

             Oxygen saturation 2022 21:53:00 96 /min                   [facility]i

[facility]



             in Arterial blood                                        [facility]

[facility]



             by Pulse oximetry                                        Branch

 

             Body weight  2022 09:48:00 138.801 kg                [facility]

[facility] of



                                                                 [facility]



                                                                 [facility]

 

             BMI          2022 09:48:00 61.80 kg/m2               [facility]

ty of



                                                                 [facility]



                                                                 [facility]

 

             Body height  2022 10:27:00 149.9 cm                  [facility]

ty of



                                                                 [facility]



                                                                 [facility]

 

             Systolic blood 2022 03:50:00 142 mm[Hg]                [facility]

sity of



             pressure                                            [facility]



                                                                 [facility]

 

             Diastolic blood 2022 03:50:00 95 mm[Hg]                 Unive

rsity of



             pressure                                            [facility]



                                                                 [facility]

 

             Heart rate   2022 03:50:00 93 /min                   [facility]

ty of



                                                                 [facility]



                                                                 [facility]

 

             Respiratory rate 2022 03:50:00 17 /min                   [facility]

[facility] of



                                                                 [facility]



                                                                 [facility]

 

             Oxygen saturation 2022 03:50:00 98 /min                   Uni

[facility] of



             in Arterial blood                                        [facility]

[facility]



             by Pulse oximetry                                        Branch

 

             Body temperature 2022 20:39:00 36.5 Tiffanie                  Univ

ers[facility] of



                                                                 [facility]



                                                                 [facility]

 

             Body weight  2022 20:39:00 136.079 kg                [facility]

 

             BMI          2022 20:39:00 60.59 kg/m2               [facility]

 

             Systolic blood 2022 01:46:00 134 mm[Hg]                Un[facility]

sity of



             pressure                                            [facility]

 

             Diastolic blood 2022 01:46:00 100 mm[Hg]                Unive

rsity of



             [facility]

 

             Heart rate   2022 01:46:00 102 /min                  [facility]

 

             Respiratory rate 2022 01:46:00 22 /min                   [facility]

 

             Oxygen saturation 2022 01:46:00 96 /min                   Uni

[facility] of



             in Arterial blood                                        [facility]

sar[facility]



             by Pulse oximetry                                        [place]

 

             Body temperature 2022-01-15 20:52:00 36.67 Tiffanie                 [facility]

 

             Body weight  2022-01-15 20:52:00 136.079 kg                [facility]

 

             BMI          2022-01-15 20:52:00 60.59 kg/m2               [facility]

 

             height       2021 11:20:00 59 [in_i]                 [facility]

 

             weight       2021 11:20:00 350 [lb_av]               [facility]

 

             temperature  2021 11:20:00 97.8 [degF]               [facility]

 

             bmi          2021 11:20:00 70.68 kg/m2               [facility]

 

             oximetry     2021 11:20:00 94 %                      [facility]

 

             blood pressure 2021 11:20:00 160 mm[Hg]                [facility]



             systolic                                            [facility]

 

             blood pressure 2021 11:20:00 80 mm[Hg]                 [facility]



             diastolic                                           [facility]

 

             Systolic blood 2021 23:30:00 175 mm[Hg]                Univer

sity of



             [facility]

 

             Diastolic blood 2021 23:30:00 107 mm[Hg]                Unive

rsity of



             [facility]

 

             Heart rate   2021 23:30:00 81 /min                   [facility]

 

             Respiratory rate 2021 23:30:00 21 /min                   [facility]

 

             Oxygen saturation 2021 23:30:00 97 /min                   [facility]

[facility]



             in Arterial blood                                        [facility]

[facility]



             by Pulse oximetry                                        Branch

 

             Body weight  2021 21:55:00 136.079 kg                [facility]

 

             BMI          2021 21:55:00 60.59 kg/m2               [facility]

 

             Body temperature 2021 21:55:00 37.44 Tiffanie                 [facility]

 

             height       2021 13:00:00 59 [in_i]                 [facility]

 

             weight       2021 13:00:00 350 [lb_av]               [facility]

 

             temperature  2021 13:00:00 96.8 [degF]               [facility]

 

             bmi          2021 13:00:00 70.68 kg/m2               [facility]

 

             oximetry     2021 13:00:00 96 %                      [facility]

 

             blood pressure 2021 13:00:00 120 mm[Hg]                [facility]



             systolic                                            [facility]

 

             blood pressure 2021 13:00:00 77 mm[Hg]                 [facility]



             diastolic                                           [facility]

 

             height       2021 14:20:00 59 [in_i]                 [facility]

 

             weight       2021 14:20:00 350 [lb_av]               [facility]

 

             temperature  2021 14:20:00 95 [degF]                 [facility]

 

             bmi          2021 14:20:00 70.68 kg/m2               [facility]

 

             oximetry     2021 14:20:00 98 %                      [facility]

 

             blood pressure 2021 14:20:00 128 mm[Hg]                [facility]

 [facility] -



             systolic                                            [facility]

 

             blood pressure 2021 14:20:00 82 mm[Hg]                 [facility]

 [facility] -



             diastolic                                           [facility]

 

             height       2021 13:40:00 59 [in_i]                 [facility]

 

             weight       2021 13:40:00 350 [lb_av]               [facility]

 

             temperature  2021 13:40:00 97.4 [degF]               [facility]

[facility]

 

             bmi          2021 13:40:00 70.68 kg/m2               [facility]

 

             oximetry     2021 13:40:00 91 %                      [facility]

 

             blood pressure 2021 13:40:00 132 mm[Hg]                [facility]

 [facility] -



             systolic                                            [facility]

 

             blood pressure 2021 13:40:00 87 mm[Hg]                 [facility]

 [facility] -



             diastolic                                           [facility]

 

             Systolic blood 2021-05-10 01:30:00 163 mm[Hg]                Univer

sity of



             pressure                                            [facility]

 

             Diastolic blood 2021-05-10 01:30:00 106 mm[Hg]                Unive

rsity of



             [facility]

 

             Heart rate   2021-05-10 01:30:00 97 /min                   [facility]

 

             Respiratory rate 2021-05-10 01:30:00 21 /min                   Univ

ersity [facility]

 

             Oxygen saturation 2021-05-10 01:30:00 97 /min                   Uni

[facility]



             in Arterial blood                                        [facility]

sar[facility]



             by Pulse oximetry                                        [place]

 

             Body temperature 2021 23:27:00 36.83 Tiffanie                 [facility]

 

             Body height  2021 23:27:00 149.9 cm                  [facility]

 

             Body weight  2021 23:27:00 136.079 kg                [facility]

 

             BMI          2021 23:27:00 60.59 kg/m2               [facility]

 

             Systolic blood 2021-05-10 01:30:00 163 mm[Hg]                Un[facility]

sity of



             [facility]

 

             Diastolic blood 2021-05-10 01:30:00 106 mm[Hg]                Unive

rsity of



             pressure                                            [facility]



                                                                 [facility]anch

 

             Heart rate   2021-05-10 01:30:00 97 /min                   [facility]

ty of



                                                                 [facility]



                                                                 [facility]

 

             Respiratory rate 2021-05-10 01:30:00 21 /min                   [facility]iv

ers[facility] of



                                                                 [facility]



                                                                 [facility]

 

             Oxygen saturation 2021-05-10 01:30:00 97 /min                   Uni

[facility] of



             in Arterial blood                                        [facility]

[facility]



             by Pulse oximetry                                        Branch

 

             Body temperature 2021 23:27:00 36.83 Tiffanie                 [facility]iv

ers[facility] of



                                                                 [facility]



                                                                 [facility]

 

             Body height  2021 23:27:00 149.9 cm                  [facility]

ty of



                                                                 [facility]



                                                                 [place]

 

             Body weight  2021 23:27:00 136.079 kg                [facility]

ty of



                                                                 [facility]



                                                                 [facility]

 

             BMI          2021 23:27:00 60.59 kg/m2               Univers[facility]

ty of



                                                                 [facility]

 

             Systolic blood 2022 16:49:00 127 mm[Hg]                Univer

sity of



             pressure                                            [facility]



                                                                 [facility]

 

             Diastolic blood 2022 16:49:00 86 mm[Hg]                 Unive

rsity of



             pressure                                            [facility]



                                                                 [facility]

 

             Heart rate   2022 16:49:00 101 /min                  [facility]

ty of



                                                                 [facility]



                                                                 [place]

 

             Body temperature 2022 16:49:00 36.83 Tiffanie                 Un[facility]

ers[facility] of



                                                                 [facility]



                                                                 [facility]

 

             Respiratory rate 2022 16:49:00 20 /min                   [facility]iv

ers[facility] of



                                                                 [facility]



                                                                 [facility]

 

             Oxygen saturation 2022 16:49:00 95 /min                   Uni

[facility] of



             in Arterial blood                                        [facility]

sarah



             by Pulse oximetry                                        Branch

 

             Body height  2022 10:00:00 149.9 cm                  [facility]

ty of



                                                                 [facility]



                                                                 [facility]

 

             Body weight  2022 10:00:00 123 kg                    [facility]

ty of



                                                                 [facility]



                                                                 [facility]

 

             BMI          2022 10:00:00 54.77 kg/m2               Universi

ty of



                                                                 [facility]

 

             Temperature Oral 2022 21:12:00 98.0 F                    [facility]



             (F)                                                 

 

             Heart Rate   2022 21:12:00                           [facility]

 

             Respitory Rate 2022 21:12:00                           [facility]

 

             Systolic (mm Hg) 2022 21:12:00                           [facility]

[facility]

 

             Diastolic (mm Hg) 2022 21:12:00                           [facility]

[facility]

 

             Heart Rate   2022 17:09:26                           [facility]

 [facility]

 

             Respitory Rate 2022 17:09:26                           [facility]

[facility] [place]

 

             Temperature Oral 2022 17:09:08 98.2 F                    [facility]

[facility]



             (F)                                                 

 

             Systolic (mm Hg) 2022 17:08:50                           [facility]

[facility]

 

             Diastolic (mm Hg) 2022 17:08:50                           [facility]

[facility]

 

             Heart Rate   2022 17:08:50                           [facility]

 [facility]

 

             Respitory Rate 2022 13:07:22                           [facility]

[facility]

 

             Temperature Oral 2022 13:07:04 97.7 F                    [facility]

[facility]



             (F)                                                 

 

             Systolic (mm Hg) 2022 13:06:57                           [facility]

[facility]

 

             Diastolic (mm Hg) 2022 13:06:57                           [facility]

[facility]

 

             Heart Rate   2022 10:00:21                           [facility]

 [place]

 

             Respitory Rate 2022 10:00:21                           [facility]

[facility]

 

             Temperature Oral 2022 09:59:28 97.5 F                    [facility]

[facility]



             (F)                                                 

 

             Systolic (mm Hg) 2022 09:58:18                           [facility]

[facility] [place]

 

             Diastolic (mm Hg) 2022 09:58:18                           [facility]

[facility] [place]

 

             Heart Rate   2022 09:58:18                           [facility]

 [place]

 

             Heart Rate   2022 04:58:41                           [facility]

 [place]

 

             Respitory Rate 2022 04:58:41                           [facility]

[facility] [place]

 

             Temperature Oral 2022 04:58:34 97.2 F                    [facility]

[facility]



             (F)                                                 

 

             Systolic (mm Hg) 2022 04:57:48                           [facility]

[facility]

 

             Diastolic (mm Hg) 2022 04:57:48                           Mem

[facility] [place]

 

             Respitory Rate 2022 00:54:28                           [facility]

[facility] [place]

 

             Systolic (mm Hg) 2022 00:54:19                           [facility]

[facility]

 

             Diastolic (mm Hg) 2022 00:54:19                           [facility]

[facility] [place]

 

             Temperature Oral 2022 00:53:24 98.1 F                    [facility]

[facility] [place]



             (F)                                                 

 

             Height       2022 23:39:00 149.86 cm                 [facility]

 [place]

 

             Weight       2022 23:39:00                           [facility]

 [facility]

 

             BMI Calculated 2022 23:39:00                           [facility]

[facility]

 

             Systolic (mm Hg) 2022 14:00:00                           [facility]

[facility] [place]

 

             Diastolic (mm Hg) 2022 14:00:00                           [facility]

 

             Respitory Rate 2022 14:00:00                           [facility]

[facility]

 

             Respitory Rate 2022 13:00:00                           [facility]

[facility]

 

             Systolic (mm Hg) 2022 13:00:00                           [facility]

[facility]

 

             Diastolic (mm Hg) 2022 13:00:00                           [facility]

[facility] [place]

 

             Respitory Rate 2022 12:00:00                           [facility]

[facility]

 

             Systolic (mm Hg) 2022 12:00:00                           [facility]

[facility] [place]

 

             Diastolic (mm Hg) 2022 12:00:00                           [facility]

[facility]

 

             Respitory Rate 2022 05:00:00                           [facility]

[facility]

 

             Systolic (mm Hg) 2022 05:00:00                           [facility]

[facility]

 

             Diastolic (mm Hg) 2022 05:00:00                           [facility]

[facility]

 

             Respitory Rate 2022 04:00:00                           [facility]

[facility]

 

             Systolic (mm Hg) 2022 04:00:00                           [facility]

[facility] [place]

 

             Diastolic (mm Hg) 2022 04:00:00                           [facility]

[facility]

 

             Respitory Rate 2022 03:00:00                           [facility]

[facility]

 

             Systolic (mm Hg) 2022 03:00:00                           [facility]

[facility]

 

             Diastolic (mm Hg) 2022 03:00:00                           [facility]

[facility] [place]

 

             Heart Rate   2022 15:55:41                           [facility]

 [facility]

 

             Temperature Oral 2022 15:55:32 98.3 F                    [facility]

[facility]



             (F)                                                 

 

             Heart Rate   2022 15:54:37                           [facility]

 [place]

 

             Heart Rate   2022 12:20:12                           [facility]

 [place]

 

             Temperature Oral 2022 12:19:51 97.7 F                    [facility]

[facility]



             (F)                                                 

 

             Temperature Oral 2022 09:20:54 97.9 F                    [facility]

[facility] [place]



             (F)                                                 

 

             Height       2022 06:18:00 149.86 cm                 [facility]

 [facility]

 

             Weight       2022 06:18:00                           [facility]

 [facility]

 

             BMI Calculated 2022 06:18:00                           [facility]

[facility]

 

             Respitory Rate 2018 17:30:00                           [facility]

[facility]

 

             Systolic (mm Hg) 2018 17:30:00                           [facility]

[facility] [place]

 

             Diastolic (mm Hg) 2018 17:30:00                           [facility]

[facility] [place]

 

             Temperature Oral 2018 17:30:00 98.4 F                    [facility]

[facility] [place]



             (F)                                                 

 

             Temperature Oral 2018 16:23:00 98.6 F                    [facility]

[facility]



             (F)                                                 

 

             Systolic (mm Hg) 2018 16:23:00                           [facility]

[facility]

 

             Diastolic (mm Hg) 2018 16:23:00                           [facility]

[facility] [place]

 

             Respitory Rate 2018 14:00:00                           [facility]

[facility]

 

             Systolic (mm Hg) 2018 14:00:00                           [facility]

[facility] [place]

 

             Diastolic (mm Hg) 2018 14:00:00                           [facility]

[facility] [place]

 

             Respitory Rate 2018 13:30:00                           [facility]

[facility]

 

             BMI Calculated 2018 10:16:00                           [facility]

[facility] [place]

 

             Height       2018 10:16:00 149.86 cm                 [facility]

 [facility]

 

             Weight       2018 10:16:00                           [facility]

 [place]

 

             Heart Rate   2018 10:16:00                           [facility]

 [facility]

 

             Temperature Oral 2018 10:16:00 97.9 F                    [facility]

[facility]



             (F)                                                 

 

             Temperature Oral 2018 13:40:00 97.2 F                    [facility]

[facility]



             (F)                                                 

 

             Systolic (mm Hg) 2018 13:40:00                           [facility]

[facility]

 

             Diastolic (mm Hg) 2018 13:40:00                           [facility]

[facility] [place]

 

             Heart Rate   2018 13:40:00                           [facility]

 [place]

 

             Respitory Rate 2018 13:40:00                           [facility]

al [place]

 

             Heart Rate   2018 05:24:00                           [facility]

 [place]

 

             Systolic (mm Hg) 2018 05:24:00                           [facility]

[facility]

 

             Diastolic (mm Hg) 2018 05:24:00                           [facility]

[facility] [place]

 

             Respitory Rate 2018 05:24:00                           [facility]

[facility]

 

             Temperature Oral 2018 05:24:00 98.1 F                    [facility]

[facility]



             (F)                                                 

 

             Respitory Rate 2018 01:15:00                           [facility]

[facility]

 

             Systolic (mm Hg) 2018 01:15:00                           [facility]

[facility]

 

             Diastolic (mm Hg) 2018 01:15:00                           [facility]

[facility]

 

             Heart Rate   2018 01:15:00                           [facility]

 [place]

 

             Temperature Oral 2018 01:15:00 98.1 F                    [facility]

[facility]



             (F)                                                 

 

             Weight       2018 14:00:00                           [facility]

 [place]

 

             Weight       2018 14:00:00                           [facility]

 [place]

 

             Weight       2018 14:00:00                           [facility]

 [place]

 

             BMI Calculated 2018 05:43:00                           [facility]

[facility]

 

             Height       2018 05:43:00 162.56 cm                 [facility]

 [place]

 

             Height       2018 22:41:00 149.86 cm                 [facility]

 [facility]

 

             BMI Calculated 2018 22:41:00                           [facility]

al [place]







Procedures







                Procedure       Date / Time     Performing Clinician Source



                                Performed                       

 

                POCT GLUCOSE(AGE >30DAYS) 2023 20:33:00 Schoenstein, Lynda

 [facility]

 

                COMP. METABOLIC PANEL 2023 20:32:00 Schoenstein, Lynda [facility]

[facility] [place]



                ([id number])                                         Medical Branch

 

                CBC WITH DIFF   2023 20:32:00 Schoenstein, Lynda [facility]

 

                URINALYSIS      2023 20:32:00 Schoenstein, Lynda [facility]

 

                POCT GLUCOSE (AUTOMATED) 2023 22:41:00 Radha Fofana [facility]

 

                LIPASE          2023 21:39:00 Radha Fofana [facility]

 

                COMP. METABOLIC PANEL 2023 21:39:00 Radha Fofana [facility]



                ([id number])                                         [facility]

 

                AC PANEL 21 + LACTIC ACID 2023 20:49:00 Radha Fofana [facility]

 

                CBC WITH DIFF   2023 20:48:00 Radha Fofana [facility]

 

                URINALYSIS      2023 20:48:00 Radha Fofana [facility]

 

                POCT GLUCOSE (AUTOMATED) 2023 19:37:00 Radha Fofana [facility]

 

                POCT GLUCOSE (AUTOMATED) 2023-04-10 02:38:00 Neeta Maria Un

[facility]

 

                URINALYSIS      2023 23:37:00 Neeta Maria [facility]

 

                COMP. METABOLIC PANEL 2023 23:12:00 Neeta Marai [facility]

[facility] [place]



                ([id number])                                         [facility]

 

                CBC WITH DIFF   2023 23:12:00 Neeta Maria [facility]

 

                AC PANEL 20 + LACTIC ACID 2023 23:10:00 Neeta Maria U

[facility]

 

                URINE CULTURE   2023 19:14:00 Surekha Huynh [facility]

 

                XR SKULL <4 VW  2023 00:48:18 [facility]

 

                POCT GLUCOSE (AUTOMATED) 2023 00:14:00 Bossman Villa

 [facility]

 

                URINALYSIS      2023 23:13:00 [facility]

 

                XR CHEST 1 VW   2023 22:46:00 [facility]

 

                POCT GLUCOSE (AUTOMATED) 2023 22:34:00 Bossman Villa

 [facility]

 

                CT ANGIOGRAM    2023 22:21:20 [facility] [place]



                ABDOMEN/PELVIS                                  [facility]

 

                LACTIC ACID WHOLE BLOOD 2023 21:48:00 Bossman Villa 

[facility]

 

                LIPASE          2023 21:47:00 [facility]

 

                MAGNESIUM       2023 21:47:00 [facility]

 

                COMP. METABOLIC PANEL 2023 21:47:00 Bossman Villa Un

[facility]



                ([id number])                                         [facility]

 

                CBC WITH DIFF   2023 21:47:00 [facility]

 

                COVID-19 (ID NOW RAPID 2023 21:47:00 Bossman Villa U

[facility]



                TESTING)                                        [facility]

 

                POCT GLUCOSE (AUTOMATED) 2023 18:22:00 Ruddy [facility]

 

                POCT GLUCOSE (AUTOMATED) 2023 18:22:00 Ruddy [facility]

 

                POCT GLUCOSE (AUTOMATED) 2023 13:41:00 Ruddy [facility]

 

                POCT GLUCOSE (AUTOMATED) 2023 13:41:00 Donald Fox 

[facility]

 

                BASIC METABOLIC PANEL 2023 11:02:00 Donald Fox Uni

[facility] [place]



                (NA, K, CL, CO2, GLUCOSE,                                 Medica

l Branch



                BUN, CREATININE, CA)                                 

 

                CBC WITHOUT DIFF 2023 11:02:00 [facility]

 

                BASIC METABOLIC PANEL 2023 11:02:00 Donald Fox Uni

[facility] [place]



                (NA, K, CL, CO2, GLUCOSE,                                 Medica

l Branch



                BUN, CREATININE, CA)                                 

 

                CBC WITHOUT DIFF 2023 11:02:00 Donald [facility]

 

                POCT GLUCOSE (AUTOMATED) 2023 02:49:00 Ruddy [facility]

 

                POCT GLUCOSE (AUTOMATED) 2023 02:49:00 Ruddy [facility]

 

                POCT GLUCOSE (AUTOMATED) 2023 22:57:00 Ruddy [facility]

 

                POCT GLUCOSE (AUTOMATED) 2023 22:57:00 Ruddy [facility]

 

                POCT GLUCOSE (AUTOMATED) 2023 21:25:00 Ruddy [facility]

 

                POCT GLUCOSE (AUTOMATED) 2023 21:25:00 Ruddy [facility]

 

                [facility]          2023 [facility]:31[facility]



                CULTURE(AEROBIC/ANAEROBIC                                 Medica

l Branch



                )                                               

 

                FUNGUS (ROUTINE) CULTURE 2023 20:31:00 Vikash Anglin [facility]

 

                TISSUE          2023 20:31:00 [facility]



                CULTURE(AEROBIC/ANAEROBIC                                 Medica

l Branch



                )                                               

 

                FUNGUS (ROUTINE) CULTURE 2023 20:31:00 Vikash Anglin [facility]

 

                WOUND DEBRIDEMENT 2023 19:57:00 Derrek [facility]

 

                WOUND DEBRIDEMENT 2023 19:57:00 Derrek [facility]

 

                POCT GLUCOSE (AUTOMATED) 2023 18:17:00 Ruddy [facility]

 

                POCT GLUCOSE (AUTOMATED) 2023 18:17:00 Ruddy [facility]

 

                POCT GLUCOSE (AUTOMATED) 2023 15:20:00 Ruddy [facility]

 

                POCT GLUCOSE (AUTOMATED) 2023 15:20:00 Ruddy [facility]

 

                POCT GLUCOSE (AUTOMATED) 2023 03:10:00 Ruddy [facility]

 

                POCT GLUCOSE (AUTOMATED) 2023 [facility]

 

                POCT GLUCOSE (AUTOMATED) 2023 22:42:00 Ruddy [facility]

 

                POCT GLUCOSE (AUTOMATED) 2023 22:42:00 Ruddy [facility]

 

                POCT GLUCOSE (AUTOMATED) 2023 17:34:00 PhillipsNoe valle

[facility]

 

                POCT GLUCOSE (AUTOMATED) 2023 17:34:00 Alan Noe    Bella

[facility]

 

                POCT GLUCOSE (AUTOMATED) 2023 16:41:00 [facility]

 

                PO[facility]UCOSE (AUTOMATED) 2023 16:41:00 [facility]

 

                POCT GLUCOSE (AUTOMATED) 2023 13:15:00 [facility]e

[facility]

 

                POCT GLUCOSE (AUTOMATED) 2023 13:15:00 Alan Noe [facility]

 

                PO[facility]COSE (AUTOMATED) 2023 [facility]:38:[facility]

 

                POCT GLUCOSE (AUTOMATED) 2023 07:38:00 Davey [facility]

 

                POCT GLUCOSE (AUTOMATED) 2023 06:43:00 Davey [facility]

 

                POCT GLUCOSE (AUTOMATED) 2023 06:43:00 Davey [facility]

 

                BASIC METABOLIC PANEL 2023 06:03:00 [facility]iscal 

[facility]



                (NA, K, CL, CO2, GLUCOSE,                                 Medica

l Branch



                BUN, CREATININE, CA)                                 

 

                BASIC METABOLIC PANEL 2023 06:03:00 [facility]oni [facility]iscal 

[facility]



                (NA, K, CL, CO2, GLUCOSE,                                 Medica

l Branch



                BUN, CREATININE, CA)                                 

 

                POCT GLUCOSE (AUTOMATED) 2023 05:32:00 Davey [facility]

 

                POCT GLUCOSE (AUTOMATED) 2023 05:32:00 Davey [facility]

 

                URINE CULTURE   2023 04:20:00 Davey [facility]

 

                URINE CULTURE   2023 04:20:00 Dvaey [facility]

 

                AC PANEL 21 + LACTIC ACID 2023 04:18:00 Toro Mariscal [facility]

 

                AC PANEL 21 + LACTIC ACID 2023 04:18:00 Toro Mariscal [facility]

 

                BASIC METABOLIC PANEL 2023 02:48:00 Yoni Mariscal [facility]



                (NA, K, CL, CO2, GLUCOSE,                                 Medica

l Branch



                BUN, CREATININE, CA)                                 

 

                CBC WITH DIFF   2023 02:48:00 Davey [facility]

 

                URINALYSIS      2023 02:48:00 Davey [facility]

 

                BASIC METABOLIC PANEL 2023 02:48:00 Yoni Mariscal [facility]



                (NA, K, CL, CO2, GLUCOSE,                                 Medica

l Branch



                BUN, CREATININE, CA)                                 

 

                CBC WITH DIFF   2023 02:48:00 Davey [facility]

 

                URINALYSIS      2023 02:48:00 Davey [facility]

 

                POCT GLUCOSE (AUTOMATED) 2023 02:37:00 Bossman Villa

 [facility]

 

                LIPASE          2023 00:31:00 [facility]

 

                COMP. METABOLIC PANEL 2023 00:31:00 Bossman Villa Un

[facility]



                ([id number])                                         [facility]

 

                URINALYSIS      2023-02-10 23:49:00 [facility]

 

                CBC WITH DIFF   2023-02-10 23:43:00 [facility]

 

                AC PANEL 21 + LACTIC ACID 2023-02-10 00:00:00 Inocencia Golden

 [facility]

 

                POCT GLUCOSE (AUTOMATED) 2023 23:07:00 Inocencia Golden 

[facility]

 

                POCT GLUCOSE (AUTOMATED) 2023 22:13:00 Inocencia Golden 

[facility]

 

                MAGNESIUM       2023 21:40:00 Inocencia Golden [facility]

 

                COMP. METABOLIC PANEL 2023 21:40:00 Inocencia Golden [facility]



                ([id number])                                         [facility]

 

                CBC WITH DIFF   2023 21:40:00 Inocencia Goledn [facility]

 

                POCT GLUCOSE (AUTOMATED) 2023 21:13:00 Inocencia Golden 

[facility]

 

                POCT GLUCOSE (AUTOMATED) 2023 23:54:00 Rand Dong  [facility]

 

                POCT GLUCOSE (AUTOMATED) 2023 17:33:00 Rand Dong  [facility]

 

                POCT GLUCOSE (AUTOMATED) 2023 13:57:00 Rand Dong  [facility]

 

                PHOSPHORUS      2023 11:34:00 Rand Dong  [facility]

 

                MAGNESIUM       2023 11:34:00 Rand Dong  [facility]

 

                FREE T4         2023 11:34:00 Lea reymundo  [facility]

 

                THYROID STIMULATING 2023 11:34:00 Rand Dong  [facility]



                HORMONE                                         [facility]

 

                COMP. METABOLIC PANEL 2023 11:34:00 Rand Dong  [facility]



                ([id number])                                         [facility]

 

                LIPID PANEL ([id number])(TOTAL 2023 11:34:00 Rand Dong  [facility]



                CHOLESTEROL,                                    [facility]



                TRIGLYCERIDES, HDL)                                 

 

                CBC WITH DIFF   2023 11:34:00 Rand Dong  [facility]

 

                GLYCOSYLATED HEMOGLOBIN 2023 11:34:00 Rand Dong  [facility]

[facility] [place]



                (A1C)                                           [facility]

 

                N-TERMINAL PRO-BNP 2023 11:34:00 Rand Dong  [facility]

 

                FREE T3         2023 11:34:00 Lea reymundo  [facility]

 

                POCT GLUCOSE (AUTOMATED) 2023 09:33:00 Rand Dong  [facility]

 

                POCT GLUCOSE (AUTOMATED) 2023 02:19:00 Radha Fofana [facility]

 

                POCT GLUCOSE (AUTOMATED) 2023 01:09:00 Radha Fofana [facility]

 

                POCT GLUCOSE (AUTOMATED) 2023 23:44:00 Radha Fofana [facility]

 

                CT ABDOMEN PELVIS W 2023 23:40:00 Radha Fofana [facility]

 

                URINALYSIS      2023 22:21:00 Radha Fofana [facility]

 

                POCT GLUCOSE (AUTOMATED) 2023 22:19:00 Radha Fofana [facility]

 

                AC PANEL 20 + LACTIC ACID 2023 20:54:00 [facility] 

[facility]

 

                LIPASE          2023 20:41:00 Radha Fofana [facility]

 

                TROPONIN I      2023 20:41:00 Radha Fofana [facility]

 

                COMP. METABOLIC PANEL 2023 20:41:00 Radha Fofana [facility]



                ([id number])                                         [facility]

 

                CBC WITH DIFF   2023 20:41:00 Radha Fofana [facility]

 

                N-TERMINAL PRO-BNP 2023 20:41:00 Radha Fofana [facility]

 

                HB ECG ROUTINE & RHYTHM 2023 20:37:16 Radha Fofana [facility]

 

                EMERGENCY DEPARTMENT 2023 06:01:00 Doctor Unassigned, No U

[facility]



                DOCUMENTS                       [facility]

 

                PATIENT QUESTIONNAIRE 2022 06:01:00 Doctor Unassigned, No 

[facility]

 

                POCT HEMOGLOBIN A1C TEST 2022 19:04:00 Mary Anne Ramirez         [facility]

 

                POCT GLUCOSE (AUTOMATED) 2022 22:58:00 Edwardo Lopez   [facility]

 

                POCT GLUCOSE (AUTOMATED) 2022 18:00:00 Edwardo Lopez   [facility]

 

                POCT GLUCOSE (AUTOMATED) 2022 13:46:00 Edwardo Lopez

[facility]

 

                BASIC METABOLIC PANEL 2022 10:49:00 Tabitha Sunil  [facility]

[facility] [place]



                (NA, K, CL, CO2, GLUCOSE,                                 Medica

l Branch



                BUN, CREATININE, CA)                                 

 

                CBC WITH DIFF   2022 10:49:00 Tabitha [facility] o

[facility]

 

                LACTIC ACID WHOLE BLOOD 2022 02:31:00 Tabtiha [facility]

 

                MRSA / MSSA SCREEN BY 2022 02:31:00 Edwardo Lopez   [facility]



                PCR, [facility]

 

                POCT GLUCOSE (AUTOMATED) 2022 02:09:00 Edwardo Lopez   [facility]

 

                BLOOD CULTURE SCREEN 2022 00:35:00 Neeta Maria [facility]

 

                URINALYSIS      2022 23:26:00 Neeta Maria [facility]

 

                BLOOD CULTURE SCREEN 2022 23:15:00 Neeta Maria [facility]

 

                COMP. METABOLIC PANEL 2022 23:08:00 Neeta Maria [facility]

[facility] [place]



                ([id number]

 

                CBC WITH DIFF   2022 23:08:00 Neeta Maria [facility]

 

                LACTIC ACID WHOLE BLOOD 2022 23:07:00 Neeta Maria [facility]

 

                CT ABDOMEN PELVIS W 2022-10-30 02:06:00 Silvino Garay [facility]



                CONTRAST                                        [facility]

 

                LIPASE          2022-10-30 00:59:00 Silvino Garay [facility]

 

                COMP. METABOLIC PANEL 2022-10-30 00:59:00 Silvino Garay [facility]



                ([id number])                                         [facility]

 

                CBC WITH DIFF   2022-10-30 00:59:00 Silvino Garay [facility]

 

                URINALYSIS      2022-10-30 00:49:00 Silvino Garay [facility]

 

                POCT GLUCOSE (AUTOMATED) 2022-10-23 13:14:00 [facility]  [facility]

 

                POCT GLUCOSE (AUTOMATED) 2022-10-23 01:14:00 Ed[address] [facility]

 

                POCT GLUCOSE (AUTOMATED) 2022-10-22 21:19:[address] [facility]

 

                BASIC METABOLIC PANEL 2022-10-22 11:23:00 [facility]



                (NA, K, CL, CO2, GLUCOSE,                                 Medica

l Branch



                BUN, CREATININE, CA)                                 

 

                CBC WITH DIFF   2022-10-22 11:16:00 [facility]

 

                MRSA / MSSA SCREEN BY 2022-10-22 01:13:00 Edwardo Lopez   [facility]



                PCR, [facility]

 

                POCT GLUCOSE (AUTOMATED) 2022-10-22 01:11:00 Marcelle [facility]

 

                POCT GLUCOSE (AUTOMATED) 2022-10-21 21:32:00 Marcelle [facility]

 

                URINE CULTURE   2022-10-21 16:17:00 [facility]

 

                POCT GLUCOSE (AUTOMATED) 2022-10-21 16:08:00 Marcelle [facility]

 

                POCT GLUCOSE (AUTOMATED) 2022-10-21 12:43:00 Marcelle [facility]

 

                ASPIRATE OR ABSCESS 2022-10-21 07:35:00 [facility]y [facility]

[facility] [place]



                CULTURE(AEROBIC/ANAEROBIC                                 Medica

l Branch



                )                                               

 

                URINALYSIS      2022-10-21 07:32:00 Davey [facility]

 

                CT ABDOMEN PELVIS W 2022-10-21 07:25:15 Davey [facility]

[facility] [place]



                CONTRAST                                        [facility]

 

                BLOOD CULTURE SCREEN 2022-10-21 06:01:00 Yoni Mariscal [facility]

 

                LIPASE          2022-10-21 06:01:00 Davey [facility]

 

                COMP. METABOLIC PANEL 2022-10-21 06:01:00 VerdiJanitrevin [facility]



                ([id number])                                         [facility]

 

                CBC WITH DIFF   2022-10-21 06:01:00 Verdi, [facility]

 

                POCT GLUCOSE (AUTOMATED) 2022 18:34:00 Pedro Sharpe

[facility]

 

                POCT GLUCOSE (AUTOMATED) 2022 14:37:00 Pedro Sharpe

[facility]

 

                BASIC METABOLIC PANEL 2022 13:22:00 Jessi Topete [facility]

[facility] [place]



                (NA, K, CL, CO2, GLUCOSE,                                 Medica

l Branch



                BUN, CREATININE, CA)                                 

 

                CBC WITH DIFF   2022 13:22:00 [facility]

 

                POCT GLUCOSE (AUTOMATED) 2022 10:56:00 Pedro Sharpe

[facility]

 

                POCT GLUCOSE (AUTOMATED) 2022 05:46:00 Pedro Sharpe

[facility]

 

                POCT GLUCOSE (AUTOMATED) 2022 02:17:00 Pedro Sharpe

[facility]

 

                POCT GLUCOSE (AUTOMATED) 2022 23:13:00 Pedro Sharpe

[facility]

 

                POCT GLUCOSE (AUTOMATED) 2022 18:22:00 Pedro Sharpe

[facility]

 

                POCT GLUCOSE (AUTOMATED) 2022 14:56:00 Pedro Sharpe

[facility]

 

                BASIC METABOLIC PANEL 2022 10:03:00 Matt Briseno [facility]

[facility] [place]



                (NA, K, CL, CO2, GLUCOSE,                                 Medica

l Branch



                BUN, CREATININE, CA)                                 

 

                CBC WITH DIFF   2022 10:03:00 Finn [facility]

 

                POCT GLUCOSE (AUTOMATED) 2022 02:18:00 [facility] Sharpe

[facility]

 

                POCT GLUCOSE (AUTOMATED) 2022-08-10 22:26:00 Pedro Sharpe

[facility]

 

                POCT GLUCOSE (AUTOMATED) 2022-08-10 18:57:00 Pedro Sharpe

[facility]

 

                POCT GLUCOSE (AUTOMATED) 2022-08-10 18:57:00 Pedro Sharpe

[facility]

 

                US DUPLEX VENOUS ARMS 2022-08-10 17:28:45 Finn [facility]

[facility] [place]



                BILATERAL - BY VASCULAR                                 Medical 

Branch



                LAB                                             

 

                US DUPLEX VENOUS ARMS 2022-08-10 17:28:45 Finn [facility]

[facility] [place]



                BILATERAL - BY VASCULAR                                 [facility]



                LAB                                             

 

                POCT GLUCOSE (AUTOMATED) 2022-08-10 15:20:00 Pedro Sharpe

[facility]

 

                POCT GLUCOSE (AUTOMATED) 2022-08-10 15:20:00 Pedro Sharpe

[facility]

 

                MAGNESIUM       2022-08-10 10:19:00 [facility]

 

                BASIC METABOLIC PANEL 2022-08-10 10:19:00 Edwardo Mcclelland Un[facility]

[facility] [place]



                (NA, K, CL, CO2, GLUCOSE,                 Robin         Medica

l Branch



                BUN, CREATININE, CA)                                 

 

                CBC WITH DIFF   2022-08-10 10:19:00 [facility]

 

                MAGNESIUM       2022-08-10 10:19:00 [facility]

 

                BASIC METABOLIC PANEL 2022-08-10 10:19:00 Edwardo Mcclelland Un[facility]

[facility] [place]



                (NA, K, CL, CO2, GLUCOSE,                 Robin         Medica

l Branch



                BUN, CREATININE, CA)                                 

 

                CBC WITH DIFF   2022-08-10 10:19:00 [facility]

 

                POCT GLUCOSE (AUTOMATED) 2022-08-10 02:44:00 [facility] Shar[facility]

[facility]

 

                POCT GLUCOSE (AUTOMATED) 2022-08-10 02:44:00 Pedro Sharpe

[facility]

 

                POCT GLUCOSE (AUTOMATED) 2022 22:02:00 Pedro Sharpe

[facility]

 

                POCT GLUCOSE (AUTOMATED) 2022 22:02:00 Pedro Sharpe

[facility]

 

                FUNGUS (ROUTINE) CULTURE 2022 17:03:00 Merry Bonds [facility]

 

                TISSUE          2022 17:03:00 Sapphire Bonds [facility]



                CULTURE(AEROBIC/ANAEROBIC                 A               Medica

l Branch



                )                                               

 

                FUNGUS (ROUTINE) CULTURE 2022 17:03:00 Merry Bonds [facility]

 

                TISSUE          2022 17:03:00 Sapphire Bonds [facility]



                CULTURE(AEROBIC/ANAEROBIC                 A               Medica

l Branch



                )                                               

 

                FUNGUS (ROUTINE) CULTURE 2022 17:00:00 Merry Bonds [facility]

 

                TISSUE          2022 17:00:00 Sapphire Bonds [facility]



                CULTURE(AEROBIC/ANAEROBIC                 A               Medica

l Branch



                )                                               

 

                FUNGUS (ROUTINE) CULTURE 2022 17:00:00 Merry Bonds [facility]

 

                TISSUE          2022 17:00:00 Sapphire Bonds [facility]



                CULTURE(AEROBIC/ANAEROBIC                 A               Medica

l Branch



                )                                               

 

                FUNGUS (ROUTINE) CULTURE 2022 16:59:00 Merry Bonds [facility]

 

                TISSUE          2022 16:59:00 Sapphire Bonds [facility]



                CULTURE(AEROBIC/ANAEROBIC                 A               Medica

l Branch



                )                                               

 

                FUNGUS (ROUTINE) CULTURE 2022 16:59:00 Merry Bonds [facility]

 

                TISSUE          2022 16:59:00 Sapphire Bonds [facility]



                CULTURE(AEROBIC/ANAEROBIC                 A               Medica

l Branch



                )                                               

 

                FOOT DEBRIDEMENT 2022 16:11:00 Sapphire Bonds [facility]

 

                FOOT DEBRIDEMENT 2022 16:11:00 Sapphire Bonds [facility]               Medical Branch

 

                COVID-19 (ID NOW RAPID 2022 14:36:00 Matt Briseno [facility]

[facility] [place]



                TESTING)                                        Medical Branch

 

                LAB ONLY COVID  2022 14:36:00 [facility]

 

                COVID-19 (ID NOW RAPID 2022 14:36:00 Matt Briseno Uni

[facility] [place]



                TESTING)                                        Medical Branch

 

                LAB ONLY COVID  2022 14:36:00 [facility]

 

                POCT GLUCOSE (AUTOMATED) 2022 14:07:00 Lóen Chambers  [facility]

 

                POCT GLUCOSE (AUTOMATED) 2022 14:07:00 León Chambers  [facility]

 

                HB ECG ROUTINE & RHYTHM 2022 13:27:17 Yoselyn [facility]



                STRIP                                           [facility]

 

                CBC WITH DIFF   2022 10:55:00 [facility]

 

                CBC WITH DIFF   2022 10:55:00 [facility]

 

                MAGNESIUM       2022 10:40:00 [facility]

 

                BASIC METABOLIC PANEL 2022 10:40:00 Edwardo Mcclelland Un[facility]

[facility] [place]



                (NA, K, CL, CO2, GLUCOSE,                 Robin         Medica

l Branch



                BUN, CREATININE, CA)                                 

 

                COVID-19 (ID NOW RAPID 2022 10:40:00 Ankita Crooks    [facility]



                TESTING)                                        Medical Branch

 

                LAB ONLY COVID  2022 10:40:00 Ankita Crooks    [facility]



                INTERPRETATION                                  Medical Branch

 

                MAGNESIUM       2022 10:40:00 [facility]

 

                BASIC METABOLIC PANEL 2022 10:40:00 Edwardo Mcclelland Unive

[facility] [place]



                (NA, K, CL, CO2, GLUCOSE,                 Robin         Medica

l Branch



                BUN, CREATININE, CA)                                 

 

                COVID-19 (ID NOW RAPID 2022 10:40:00 [facility]

 

                LAB ONLY COVID  2022 10:40:00 Valeriy [facility]

 

                POCT GLUCOSE (AUTOMATED) 2022 02:29:00 León Chambers  Uni

[facility]

 

                POCT GLUCOSE (AUTOMATED) 2022 02:29:00 León Chambers  Uni

[facility]

 

                POCT GLUCOSE (AUTOMATED) 2022 23:39:00 León Chambers  Uni

[facility]

 

                POCT GLUCOSE (AUTOMATED) 2022 23:39:00 León Chambers  Uni

[facility] of [facility]

 

                POCT GLUCOSE (AUTOMATED) 2022 17:10:00 León Chambers  Uni

[facility] of [facility]

 

                POCT GLUCOSE (AUTOMATED) 2022 17:10:00 León Chambers  Uni

[facility] of [facility]

 

                POCT GLUCOSE (AUTOMATED) 2022 17:10:00 León Chambers  Uni

[facility] of [facility]

 

                POCT GLUCOSE (AUTOMATED) 2022 15:54:00 León Chambers  Uni

[facility] of [facility]

 

                POCT GLUCOSE (AUTOMATED) 2022 15:54:00 León Chambers  Uni

[facility] of [facility]

 

                POCT GLUCOSE (AUTOMATED) 2022 15:54:00 León Chambers  Uni

[facility] of [facility]

 

                SEDIMENTATION RATE 2022 08:36:00 Karina Whyte

[facility]

 

                BASIC METABOLIC PANEL 2022 08:36:00 Carolann Haines [facility]



                (NA, K, CL, CO2, GLUCOSE,                                 Medica

l Branch



                BUN, CREATININE, CA)                                 

 

                CBC WITH DIFF   2022 08:36:00 Carolann Haines [facility]

 

                MAGNESIUM       2022 08:36:00 Carolann Haines [facility]

 

                MAGNESIUM       2022 08:36:00 Zaki [facility]

 

                BASIC METABOLIC PANEL 2022 08:36:00 Zaki [facility]



                (NA, K, CL, CO2, GLUCOSE,                                 Medica

l Branch



                BUN, CREATININE, CA)                                 

 

                SEDIMENTATION RATE 2022 08:36:00 [facility]

 

                CBC WITH DIFF   2022 08:36:00 Zaki [facility]

 

                MAGNESIUM       2022 [facility]:36:[facility]

 

                [facility]OLIC PANEL [facility]:36[facility]



                (NA, K, CL, CO2, GLUCOSE,                                 Medica

l Branch



                BUN, CREATININE, CA)                                 

 

                SEDIMENTATION RATE 2022 08:36:00 Isabell [facility]

 

                CBC WITH DIFF   2022 08:36:00 Zaki [facility]

 

                POCT GLUCOSE (AUTOMATED) 2022 00:57:00 Yo Law Un

iversity of 

[facility]

 

                POCT GLUCOSE (AUTOMATED) 2022 00:57:00 Abdirahman Lawy B Un

iversity of 

[facility]

 

                POCT GLUCOSE (AUTOMATED) 2022 00:57:00 Yo Law Un

iversity of 

[facility]

 

                POCT GLUCOSE (AUTOMATED) 2022 21:26:00 Abdirahman Lawy B Un

iversity of 

[facility]

 

                POCT GLUCOSE (AUTOMATED) 2022 21:26:00 Yo Law B Un

iversity of 

[facility]

 

                POCT GLUCOSE (AUTOMATED) 2022 21:26:00 Abdirahman Lawy B Un

iversity of 

[facility]

 

                POCT GLUCOSE (AUTOMATED) 2022 16:47:00 Yo Law B Un

iversity of 

[facility]

 

                POCT GLUCOSE (AUTOMATED) 2022 16:47:00 Yo Law B Un

iversity of 

[facility]

 

                POCT GLUCOSE (AUTOMATED) 2022 16:47:00 Yo Law B Un

iversity of 

[facility]

 

                BASIC METABOLIC PANEL 2022 14:21:00 Zaki [facility]



                (NA, K, CL, CO2, GLUCOSE,                                 Medica

l Branch



                BUN, CREATININE, CA)                                 

 

                C-REACTIVE PROTEIN 2022 14:21:00 [facility]

 

                C-REACTIVE PROTEIN 2022 14:21:00 [facility]

 

                BASIC METABOLIC PANEL 2022 14:21:00 Zaki [facility]



                (NA, K, CL, CO2, GLUCOSE,                                 Medica

l Branch



                BUN, CREATININE, CA)                                 

 

                C-REACTIVE PROTEIN 2022 14:21:00 [facility]

 

                BASIC METABOLIC PANEL 2022 14:21:00 Zaki [facility]



                (NA, K, CL, CO2, GLUCOSE,                                 Medica

l Branch



                BUN, CREATININE, CA)                                 

 

                POCT GLUCOSE (AUTOMATED) 2022 12:34:00 Yo Law Un

iversity of 

[facility]

 

                POCT GLUCOSE (AUTOMATED) 2022 12:34:00 Yo Law Un

iversity of 

[facility]

 

                POCT GLUCOSE (AUTOMATED) 2022 12:34:00 Yo Law Un

iversity of 

[facility]

 

                POCT GLUCOSE (AUTOMATED) 2022 01:28:00 Yo Law Un

iversity of 

[facility]

 

                POCT GLUCOSE (AUTOMATED) 2022 01:28:00 Yo Law B Un

[facility] 

[place]



                                                                Medical Branch

 

                POCT GLUCOSE (AUTOMATED) 2022 01:28:00 Yo Law Un

iversity of 

[facility]

 

                POCT GLUCOSE (AUTOMATED) 2022 21:13:00 Yo Law Un

iversity of 

[facility]

 

                POCT GLUCOSE (AUTOMATED) 2022 21:13:00 Yo Law Un

iversity of 

[facility]

 

                POCT GLUCOSE (AUTOMATED) 2022 21:13:00 Yo Law Un

iversity of 

[facility]

 

                POCT GLUCOSE (AUTOMATED) 2022 16:39:00 Yo Law Un

iversity of 

[facility]

 

                POCT GLUCOSE (AUTOMATED) 2022 16:39:00 Yo Law Un

iversity of 

[facility]

 

                POCT GLUCOSE (AUTOMATED) 2022 16:39:00 Yo Law

iversity of 

[facility]

 

                POCT GLUCOSE (AUTOMATED) 2022 13:11:00 Yo Law Un

iversity of 

[facility]

 

                POCT GLUCOSE (AUTOMATED) 2022 13:11:00 Yo Law Un

iversity of 

[facility]

 

                POCT GLUCOSE (AUTOMATED) 2022 13:11:00 Yo Law Un

iversity of 

[facility]

 

                MAGNESIUM       2022 10:28:00 Shaq [facility]

 

                BASIC METABOLIC PANEL 2022 10:28:00 [facility]



                (NA, K, CL, CO2, GLUCOSE,                                 Medica

l Branch



                BUN, CREATININE, CA)                                 

 

                MAGNESIUM       2022 10:28:00 [facility]

 

                BASIC METABOLIC PANEL 2022 10:28:00 [facility]



                (NA, K, CL, CO2, GLUCOSE,                                 Medica

l Branch



                BUN, CREATININE, CA)                                 

 

                MAGNESIUM       2022 10:28:00 [facility]

 

                BASIC METABOLIC PANEL 2022 10:28:00 [facility]

[facility]



                (NA, K, CL, CO2, GLUCOSE,                                 Medica

l Branch



                BUN, CREATININE, CA)                                 

 

                POCT GLUCOSE (AUTOMATED) 2022 10:01:00 Yo Law Un

iversity of 

[facility]

 

                POCT GLUCOSE (AUTOMATED) 2022 10:01:00 Yo Law Un

iversity of 

[facility]

 

                POCT GLUCOSE (AUTOMATED) 2022 10:01:00 Yo Law Un

iversity of 

[facility]

 

                POCT GLUCOSE (AUTOMATED) 2022 06:32:00 Yo Law Un

[facility] 

[place]



                                                                Medical Branch

 

                POCT GLUCOSE (AUTOMATED) 2022 06:32:00 Yo Law Un

[facility] 

[place]



                                                                Medical Branch

 

                POCT GLUCOSE (AUTOMATED) 2022 06:32:00 Yo Law Un

[facility] 

[place]



                                                                Medical Branch

 

                BASIC METABOLIC PANEL 2022 02:01:00 Chana [facility]

[facility]



                (NA, K, CL, CO2, GLUCOSE,                                 Medica

l Branch



                BUN, CREATININE, CA)                                 

 

                BASIC METABOLIC PANEL 2022 02:01:00 Chana [facility]

[facility]



                (NA, K, CL, CO2, GLUCOSE,                                 Medica

l Branch



                BUN, CREATININE, CA)                                 

 

                BASIC METABOLIC PANEL 2022 02:01:00 Chana [facility]

[facility]



                (NA, K, CL, CO2, GLUCOSE,                                 Medica

l Branch



                BUN, CREATININE, CA)                                 

 

                POCT GLUCOSE (AUTOMATED) 2022 01:53:00 Yo Law Un

[facility] 

[place]



                                                                Medical Branch

 

                POCT GLUCOSE (AUTOMATED) 2022 01:53:00 Yo Law Un

[facility] 

[place]



                                                                Medical Branch

 

                POCT GLUCOSE (AUTOMATED) 2022 01:53:00 Yo Lwa Un

[facility] 

[place]



                                                                Medical Branch

 

                POCT GLUCOSE (AUTOMATED) 2022 23:02:00 Yo Law Un

[facility] 

[place]



                                                                Medical Branch

 

                POCT GLUCOSE (AUTOMATED) 2022 23:02:00 Yo Law Un

[facility] 

[place]



                                                                Medical Branch

 

                POCT GLUCOSE (AUTOMATED) 2022 23:02:00 Yo Law Un

[facility] 

[place]



                                                                Medical Branch

 

                XR FOOT 3+ VW BILATERAL 2022 21:35:00 Sapphire Bonds

 [facility]



                                                A               Medical Branch

 

                XR FOOT 3+ VW BILATERAL 2022 21:35:00 Teo Bondsuwatosin

 [facility]



                                                A               Medical Branch

 

                XR FOOT 3+ VW BILATERAL 2022 21:35:00 Sapphire Bonds

 [facility]

 

                POCT GLUCOSE (AUTOMATED) 2022 20:45:00 Yo Law Un

iversity of 

[facility]

 

                POCT GLUCOSE (AUTOMATED) 2022 20:45:00 Yo Law Un

iversity of 

[facility]

 

                POCT GLUCOSE (AUTOMATED) 2022 20:45:00 Yo Law Un

iversity of 

[facility]

 

                POCT GLUCOSE (AUTOMATED) 2022 16:41:00 Yo Law Un

iversity of 

[facility]

 

                POCT GLUCOSE (AUTOMATED) 2022 16:41:00 Yo Law Un

iversity of 

[facility]

 

                POCT GLUCOSE (AUTOMATED) 2022 16:41:00 Yo Law Un

iversity of 

[facility]

 

                BASIC METABOLIC PANEL 2022 15:35:00 [facility] Law [facility]

[facility]



                (NA, K, CL, CO2, GLUCOSE,                                 Medica

l Branch



                BUN, CREATININE, CA)                                 

 

                BASIC METABOLIC PANEL 2022 15:35:00 Yo Law [facility]

[facility]



                (NA, K, CL, CO2, GLUCOSE,                                 Medica

l Branch



                BUN, CREATININE, CA)                                 

 

                BASIC METABOLIC PANEL 2022 15:35:00 Yo Law [facility]

[facility]



                (NA, K, CL, CO2, GLUCOSE,                                 Medica

l Branch



                BUN, CREATININE, CA)                                 

 

                BETA HYDROXY-BUTYRATE 2022 15:34:00 Donald Collazo     [facility]

 

                BETA HYDROXY-BUTYRATE 2022 15:34:00 Donald Collazo     [facility]

 

                BETA HYDROXY-BUTYRATE 2022 15:34:00 Donald Collazo     [facility]

 

                POCT GLUCOSE (AUTOMATED) 2022 14:20:00 Yo Law Un

iversity of 

[facility]

 

                POCT GLUCOSE (AUTOMATED) 2022 14:20:00 Yo Law

iversity of 

[facility]

 

                POCT GLUCOSE (AUTOMATED) 2022 14:20:00 Yo Law Un

iversity of 

[facility]

 

                POCT GLUCOSE (AUTOMATED) 2022 12:52:00 Yo Law Un

iversity of 

[facility]

 

                POCT GLUCOSE (AUTOMATED) 2022 12:52:00 Yo Law Un

iversity of 

[facility]

 

                POCT GLUCOSE (AUTOMATED) 2022 12:52:00 Yo Law Un

iversity of 

[facility]

 

                POCT GLUCOSE (AUTOMATED) 2022 09:04:00 Yo Law Un

iversity of 

[facility]

 

                POCT GLUCOSE (AUTOMATED) 2022 09:04:00 Yo Law Un

iversity of 

[facility]

 

                POCT GLUCOSE (AUTOMATED) 2022 09:04:00 Yo Law Un

iversity of 

[facility]

 

                BASIC METABOLIC PANEL 2022 06:13:00 Skylar Tinajero  [facility]



                (NA, K, CL, CO2, GLUCOSE,                                 Medica

l Branch



                BUN, CREATININE, CA)                                 

 

                GLUTAMIC ACID   2022 06:13:00 Skylar Tinajero  [facility]

 

                CBC WITHOUT DIFF 2022 06:13:00 [facility]

 

                MAGNESIUM       2022 06:13:00 Skylar Tinajero  [facility]

 

                MAGNESIUM       2022 06:13:00 Skylar Tinajero  [facility]

 

                BASIC METABOLIC PANEL 2022 06:13:00 Skylar Tinajero  [facility]



                (NA, K, CL, CO2, GLUCOSE,                                 Medica

l Branch



                BUN, CREATININE, CA)                                 

 

                CBC WITHOUT DIFF 2022 06:13:00 [facility]

 

                GLUTAMIC ACID   2022 06:13:00 Skylar Tinajero  [facility]

 

                MAGNESIUM       2022 06:13:00 Tanvi, [facility]

 

                BASIC METABOLIC PANEL 2022 06:13:00 [facility]



                (NA, K, CL, CO2, GLUCOSE,                                 Medica

l Branch



                BUN, CREATININE, CA)                                 

 

                CBC [facility] DIFF 2022 [facility]:13[facility]

 

                GLUTAMIC ACID   2022 06:13:00 [facility]

 

                POCT GLUCOSE (AUTOMATED) 2022 05:45:00 Yo Law Un

iversity of 

[facility]

 

                POCT GLUCOSE (AUTOMATED) 2022 05:45:00 Yo Law Un

iversity of 

[facility]

 

                POCT GLUCOSE (AUTOMATED) 2022 05:45:00 Yo Law Un

iversity of 

[facility]

 

                POCT GLUCOSE (AUTOMATED) 2022 01:26:00 Yo Law Un

iversity of 

[facility]

 

                POCT GLUCOSE (AUTOMATED) 2022 01:26:00 Yo Law Un

iversity of 

[facility]

 

                POCT GLUCOSE (AUTOMATED) 2022 01:26:00 Yo Law Un

iversity of 

[facility]

 

                POCT GLUCOSE (AUTOMATED) 2022 23:42:00 Yo Law Un

iversity of 

[facility]

 

                POCT GLUCOSE (AUTOMATED) 2022 23:42:00 Yo Law Un

iversity of 

[facility]

 

                POCT GLUCOSE (AUTOMATED) 2022 23:42:00 Yo Law Un

iversity of 

[facility]

 

                BASIC METABOLIC PANEL 2022 22:51:00 Yo Law Unive

[facility] [place]



                (NA, K, CL, CO2, GLUCOSE,                                 Medica

l Branch



                BUN, CREATININE, CA)                                 

 

                BASIC METABOLIC PANEL 2022 22:51:00 [facility] Law [facility]

rsity [facility]



                (NA, K, CL, CO2, GLUCOSE,                                 Medica

l Branch



                BUN, CREATININE, CA)                                 

 

                BASIC METABOLIC PANEL 2022 22:51:00 Yo Law [facility]



                (NA, K, CL, CO2, GLUCOSE,                                 Medica

l Branch



                BUN, CREATININE, CA)                                 

 

                POCT GLUCOSE (AUTOMATED) 2022 22:37:00 Yo Law Un

iversity of 

[facility]

 

                POCT GLUCOSE (AUTOMATED) 2022 22:37:00 Yo Law Un

iversity of 

[facility]

 

                POCT GLUCOSE (AUTOMATED) 2022 22:37:00 Yo Law Un

iversity of 

[facility]

 

                POCT GLUCOSE (AUTOMATED) 2022 21:30:00 Yo Lwa Un

iversity of 

[facility]

 

                POCT GLUCOSE (AUTOMATED) 2022 21:30:00 Yo Law Un

iversity of 

[facility]

 

                POCT GLUCOSE (AUTOMATED) 2022 21:30:00 Yo Law Un

iversity of 

[facility]

 

                POCT GLUCOSE (AUTOMATED) 2022 20:05:00 oY Law Un

iversity of 

[facility]

 

                POCT GLUCOSE (AUTOMATED) 2022 20:05:00 Yo Law Un

iversity of 

[facility]

 

                POCT GLUCOSE (AUTOMATED) 2022 20:05:00 Yo Law Un

iversity of 

[facility]

 

                HB ECG ROUTINE & RHYTHM 2022 19:59:13 Chana New Uni

versity of [facility]

 

                HB ECG ROUTINE & RHYTHM 2022 19:59:13 Chana New Uni

versity of [facility]

 

                HB ECG ROUTINE & RHYTHM 2022 19:59:13 Chana New Uni

vers[facility] of [facility]

 

                POCT GLUCOSE (AUTOMATED) 2022 19:06:00 Yo Law Un

iversity of 

[facility]

 

                POCT GLUCOSE (AUTOMATED) 2022 19:06:00 Yo Law Un

iversity of 

[facility]

 

                POCT GLUCOSE (AUTOMATED) 2022 19:06:00 Yo Law Un

[facility]

 

                BLOOD CULTURE SCREEN 2022 18:49:00 [facility]

 

                BLOOD CULTURE SCREEN 2022 18:49:00 [facility]

 

                BLOOD CULTURE SCREEN 2022 18:49:00 [facility]

 

                BLOOD CULTURE SCREEN 2022 18:48:00 [facility]

 

                BLOOD CULTURE SCREEN 2022 18:48:00 [facility]

 

                BLOOD CULTURE SCREEN 2022 18:48:00 [facility]

 

                XR CHEST 1    2022 18:34:00 Butt [facility]

 

                XR CHEST 1    2022 18:34:00 Butt, [facility]

 

                XR CHEST 1    2022 18:34:00 Butt, [facility]

 

                BASIC METABOLIC PANEL 2022 17:49:00 Yo Law [facility]



                (NA, K, CL, CO2, GLUCOSE,                                 Medica

l Branch



                BUN, CREATININE, CA)                                 

 

                TROPONIN I      2022 17:49:00 [facility]

 

                TROPONIN I      2022 17:49:00 [facility]

 

                BASIC METABOLIC PANEL 2022 17:49:00 Yo Law [facility]



                (NA, K, CL, CO2, GLUCOSE,                                 Medica

l Branch



                BUN, CREATININE, CA)                                 

 

                TROPONIN I      2022 17:49:00 [facility]

 

                BASIC METABOLIC PANEL 2022 17:49:00 Yo Law [facility]



                (NA, K, CL, CO2, GLUCOSE,                                 Medica

l Branch



                BUN, CREATININE, CA)                                 

 

                POCT GLUCOSE (AUTOMATED) 2022 17:27:00 Yo Law Un

[facility]

 

                POCT GLUCOSE (AUTOMATED) 2022 17:27:00 Yo Law Un

iversity of 

[place]



                                                                Medical Branch

 

                POCT GLUCOSE (AUTOMATED) 2022 17:27:00 Yo Law Un

iversity of 

[place]



                                                                Medical Branch

 

                POCT GLUCOSE (AUTOMATED) 2022 15:53:00 Yo Law Un

iversity of 

[place]



                                                                Medical Branch

 

                POCT GLUCOSE (AUTOMATED) 2022 15:53:00 Yo Law Un

iversity of 

[place]



                                                                Medical Branch

 

                POCT GLUCOSE (AUTOMATED) 2022 15:53:00 Yo Law Un

iversity of 

[place]



                                                                Medical Branch

 

                BASIC METABOLIC PANEL 2022 15:32:00 Yo Law Unive

[facility] [place]



                (NA, K, CL, CO2, GLUCOSE,                                 Medica

l Branch



                BUN, CREATININE, CA)                                 

 

                BASIC METABOLIC PANEL 2022 15:32:00 Yo Law Unive

[facility] [place]



                (NA, K, CL, CO2, GLUCOSE,                                 Medica

l Branch



                BUN, CREATININE, CA)                                 

 

                BASIC METABOLIC PANEL 2022 15:32:00 Yo Law Unive

[facility] [place]



                (NA, K, CL, CO2, GLUCOSE,                                 Medica

l Branch



                BUN, CREATININE, CA)                                 

 

                POCT GLUCOSE (AUTOMATED) 2022 14:56:00 Yo Law Un

iversity of 

[place]



                                                                Medical Branch

 

                POCT GLUCOSE (AUTOMATED) 2022 14:56:00 Yo Law Un

iversity of 

[place]



                                                                Medical Branch

 

                POCT GLUCOSE (AUTOMATED) 2022 14:56:00 Yo Law Un

iversity of 

[place]



                                                                Medical Branch

 

                POCT GLUCOSE (AUTOMATED) 2022 13:48:00 Yo Law Un

iversity of 

[place]



                                                                Medical Branch

 

                POCT GLUCOSE (AUTOMATED) 2022 13:48:00 Yo Law Un

iversity of 

[place]



                                                                Medical Branch

 

                POCT GLUCOSE (AUTOMATED) 2022 13:48:00 Yo Law Un

iversity of 

[place]



                                                                Medical Branch

 

                POCT GLUCOSE (AUTOMATED) 2022 10:42:00 Yo Law Un

iversity of 

[facility]

 

                POCT GLUCOSE (AUTOMATED) 2022 10:42:00 Yo Law Un

iversity of 

[facility]

 

                POCT GLUCOSE (AUTOMATED) 2022 10:42:00 Yo Law Un

iversity [facility]

 

                BASIC METABOLIC PANEL [facility]22 [facility]



                (NA, K, CL, CO2, GLUCOSE,                                 Medica

l Branch



                BUN, CREATININE, CA)                                 

 

                MAGNESIUM       2022 10:14:00 [facility]

 

                MAGNESIUM       2022 10:14:00 [facility]

 

                BASIC METABOLIC PANEL 2022 10:14:00 [facility]



                (NA, K, CL, CO2, GLUCOSE,                                 Medica

l Branch



                BUN, CREATININE, CA)                                 

 

                MAGNESIUM       2022 10:14:00 [facility]

 

                BASIC METABOLIC PANEL 2022 10:14:00 Haines, [facility]



                (NA, K, CL, CO2, GLUCOSE,                                 Medica

l Branch



                BUN, CREATININE, CA)                                 

 

                POCT GLUCOSE (AUTOMATED) 2022 09:30:00 Yo Law Un

iversity [facility]

 

                POCT GLUCOSE (AUTOMATED) 2022 09:30:00 Yo Law Un

iversity of 

[facility]

 

                POCT GLUCOSE (AUTOMATED) 2022 09:30:00 Yo Law Un

iversity of 

[facility]

 

                POCT GLUCOSE (AUTOMATED) 2022 07:11:00 Yo Law Un

iversity of 

[facility]

 

                POCT GLUCOSE (AUTOMATED) 2022 07:11:00 Yo Law Un

iversity of 

[facility]

 

                POCT GLUCOSE (AUTOMATED) 2022 07:11:00 Yo Law Un

iversity of 

[facility]

 

                OSMOLALITY, SERUM OR 2022 07:07:00 Yo Lawer

[facility]

 

                BETA HYDROXY-BUTYRATE 2022 07:07:00 Yo Law Unive

[facility]

 

                OSMOLALITY, SERUM OR 2022 07:07:00 Yo Lawer

[facility]

 

                BETA HYDROXY-BUTYRATE 2022 07:07:00 Yo Lawe

[facility]

 

                OSMOLALITY, SERUM OR 2022 07:07:00 Yo Lawer

[facility]

 

                BETA HYDROXY-BUTYRATE 2022 07:07:00 Yo Law

[facility]

 

                BASIC METABOLIC PANEL 2022 07:06:00 Yo Law [facility]

[facility]



                (NA, K, CL, CO2, GLUCOSE,                                 Medica

l Branch



                BUN, CREATININE, CA)                                 

 

                BASIC METABOLIC PANEL 2022 07:06:00 Yo Law [facility]

[facility]



                (NA, K, CL, CO2, GLUCOSE,                                 Medica

l Branch



                BUN, CREATININE, CA)                                 

 

                BASIC METABOLIC PANEL 2022 07:06:00 Yo Law [facility]

[facility]



                (NA, K, CL, CO2, GLUCOSE,                                 Medica

l Branch



                BUN, CREATININE, CA)                                 

 

                CT ABDOMEN PELVIS W 2022 05:28:46 Yo Law Univers

[facility]



                CONTRAST                                        [facility]

 

                CT ABDOMEN PELVIS W 2022 05:28:46 Yo Law [facility]



                CONTRAST                                        [facility]

 

                CT ABDOMEN PELVIS W 2022 05:28:46 Yo Law Univers

[facility]



                CONTRAST                                        [facility]

 

                POCT GLUCOSE(AGE >30DAYS) 2022 05:11:00 Yo Law U

[facility]

 

                POCT GLUCOSE(AGE >30DAYS) 2022 05:11:00 Yo Law U

[facility]

 

                POCT GLUCOSE(AGE >30DAYS) 2022 05:11:00 Yo Law [facility]

 

                POCT GLUCOSE (AUTOMATED) 2022 05:08:00 Yo Law Un

[facility]

 

                POCT GLUCOSE (AUTOMATED) 2022 05:08:00 Yo Law Un

[facility]

 

                POCT GLUCOSE (AUTOMATED) 2022 05:08:00 Yo Law Un

[facility]

 

                BLOOD CULTURE SCREEN 2022 04:31:00 Yo Law [facility]

[facility]

 

                BLOOD CULTURE WORKUP 2022 04:31:00 Yo Law [facility]

 

                GRAM POSITIVE BLOOD 2022 04:31:00 Yo Law [facility]



                [facility]



                PROBE-AEROBIC                                   

 

                BLOOD CULTURE SCREEN 2022 04:31:00 [facility]

 

                BLOOD CULTURE WORKUP 2022 04:31:00 Yo Law [facility]

 

                GRAM POSITIVE BLOOD 2022 04:31:00 Yo Law [facility]



                [facility]



                PROBE-AEROBIC                                   

 

                BLOOD CULTURE SCREEN 2022 04:31:00 Yo Law [facility]

 

                BLOOD CULTURE WORKUP 2022 04:31:00 [facility]

 

                GRAM POSITIVE BLOOD 2022 04:31:00 Yo Law [facility]



                [facility]



                PROBE-AEROBIC                                   

 

                URINALYSIS      2022 04:21:00 Yo Law [facility]

 

                URINE CULTURE   2022 04:21:00 Yo Law [facility]

 

                URINALYSIS      2022 04:21:00 Yo Law [facility]

 

                URINE CULTURE   2022 04:21:00 Yo Law [facility]

 

                URINALYSIS      2022 04:21:00 Yo Law [facility]

 

                URINE CULTURE   2022 04:21:00 Yo Law [facility]

 

                COVID-19 (ID NOW RAPID 2022 04:20:00 Thanh Yo B Univ

[facility] [place]



                TESTING)                                        Medical Branch

 

                LAB ONLY COVID  2022 04:20:00 Yo Law [facility]

 

                COVID-19 (ID NOW RAPID 2022 04:20:00 Thanh Yo B Univ

[facility] [place]



                TESTING)                                        Medical Branch

 

                LAB ONLY COVID  2022 04:20:00 Yo Law [facility]

 

                COVID-19 (ID NOW RAPID 2022 04:20:00 Thanh Yo B Univ

[facility] [place]



                TESTING)                                        Medical Branch

 

                LAB ONLY COVID  2022 04:20:00 Yo Law [facility]

 

                CBC WITH DIFF   2022 04:18:00 Yo Law [facility]

 

                PROTHROMBIN TIME / INR 2022 04:18:00 Thanh Yo [facility]

 

                BASIC METABOLIC PANEL 2022 04:18:00 Yo Law [facility]



                (NA, K, CL, CO2, GLUCOSE,                                 Medica

l Branch



                BUN, CREATININE, CA)                                 

 

                HEPATIC FUNCTION PANEL 2022 04:18:00 Yo Law Univ

[facility] [place]



                ([id number]) (ALB,T.PRO,BILI                                 [facility] 

Branch



                T,BU/BC,ALT,AST,ALK PHOS)                                 

 

                LIPASE          2022 04:18:00 Yo Law [facility]

 

                ACUTE CARE VENOUS BLOOD 2022 04:18:00 Yo Law Uni

[facility] [place]



                GAS                                             [facility]

 

                LACTIC ACID WHOLE BLOOD 2022 04:18:00 [facility] Law [facility]

 

                MAGNESIUM       2022 04:18:00 Yo Law [facility]

 

                PHOSPHORUS      2022 04:18:00 ThanhYo mills [facility]

 

                GLYCOSYLATED HEMOGLOBIN 2022 04:18:00 Yo Law B Uni

[facility] [place]



                ([facility])                                           [facility]

 

                PHOSPHORUS      2022 04:18:00 [place] [facility]

 

                LIPASE          2022 04:18:[place] [facility]

 

                MAGNESIUM       2022 04:18:00 [place], Yo B [facility]

 

                HEPATIC FUNCTION PANEL 2022 04:18:00 [place] Yo B Univ

[facility] [place]



                ([id number]) (ALB,T.PRO,[facility]



                T,BU/BC,ALT,AST,ALK PHOS)                                 

 

                BASIC METABOLIC PANEL 2022 04:18:00 Yo Law [facility]



                (NA, K, CL, CO2, GLUCOSE,                                 Medica

l Branch



                BUN, CREATININE, CA)                                 

 

                ACUTE CARE VENOUS BLOOD 2022 04:18:00 [place] Uni

[facility] [place]



                GAS                                             [facility]

 

                CBC WITH DIFF   2022 04:18:00 Yo Law [facility]

 

                GLYCOSYLATED HEMOGLOBIN 2022 04:18:00 Thanh Yo B Uni

[facility] [place]



                ([facility])                                           [facility]

 

                PROTHROMBIN TIME / INR 2022 04:18:00 Thanh Yo B [facility]

 

                LACTIC ACID WHOLE BLOOD 2022 04:18:00 ThanhYo mills [facility]

 

                PHOSPHORUS      2022 04:18:00 ThanhYo [facility]

 

                LIPASE          2022 04:18:00 [place] [facility]

 

                MAGNESIUM       2022 04:18:00 [place] [facility]

 

                HEPATIC FUNCTION PANEL 2022 04:18:00 Thanh Yo B Univ

[facility] [place]



                ([id number]) (ALB,T.PRO,[facility]



                T,BU/BC,ALT,AST,ALK PHOS)                                 

 

                BASIC METABOLIC PANEL 2022 04:18:00 Yo Law [facility]



                (NA, K, CL, CO2, GLUCOSE,                                 Medica

l Branch



                BUN, CREATININE, CA)                                 

 

                ACUTE CARE VENOUS BLOOD 2022 04:18:00 Yo Law Uni

versity [facility]

 

                CBC WITH DIFF   2022 04:18:00 Yo Law [facility]

 

                GLYCOSYLATED HEMOGLOBIN 2022 04:18:00 Yo Law Uni

[facility] [place]



                (A1C)                                           [facility]

 

                PROTHROMBIN TIME / INR 2022 04:18:00 Yo Law [facility]

[facility]

 

                LACTIC ACID WHOLE BLOOD 2022 04:18:00 Yo Law Uni

versity of [facility]

 

                EMERGENCY DEPARTMENT 2022 05:01:00 Doctor Unassigned, No U

niversity of 

[facility]

 

                HOSPITAL ADMISSION 2022 05:01:00 Doctor Unassigned, No Uni

versity of [facility]

 

                EMERGENCY DEPARTMENT 2022 05:01:00 Doctor Unassigned, No U

niversity of 

[facility]

 

                HOSPITAL ADMISSION 2022 05:01:00 Doctor Unassigned, No Uni

versity of [facility]

 

                EMERGENCY DEPARTMENT 2022 05:01:00 Doctor Unassigned, No U

niversity of 

[facility]

 

                HOSPITAL ADMISSION 2022 05:01:00 Doctor Unassigned, No Uni

versity of [facility]

 

                POCT GLUCOSE (AUTOMATED) 2022 19:34:00 Edsofia, [facility]  Uni

versity of [facility]

 

                POCT GLUCOSE (AUTOMATED) 2022 19:34:00 Edionwe, [facility]  Uni

versity of [facility]

 

                POCT GLUCOSE (AUTOMATED) 2022 16:39:00 Edionshraddha, [facility]  Uni

versity of [facility]

 

                POCT GLUCOSE (AUTOMATED) 2022 16:39:00 EdRachel black  Uni

versity of [facility]

 

                BASIC METABOLIC PANEL 2022 09:13:00 Sunil Lu  Univer

[facility] [place]



                (NA, K, CL, CO2, GLUCOSE,                                 Medica

l Branch



                BUN, CREATININE, CA)                                 

 

                BASIC METABOLIC PANEL 2022 09:13:00 Tabitha Sunil  [facility]

[facility] [place]



                (NA, K, CL, CO2, GLUCOSE,                                 Medica

l Branch



                BUN, CREATININE, CA)                                 

 

                POCT GLUCOSE (AUTOMATED) 2022 01:45:00 Edsofia Mercy  Velteo

versity [facility]

 

                POCT GLUCOSE (AUTOMATED) 2022 01:45:00 Edionwe, Mercy  Velte[place]

versity [facility]

 

                POCT GLUCOSE (AUTOMATED) 2022 21:36:00 Edionwe, Mercy  Uni

versity [facility]

 

                POCT GLUCOSE (AUTOMATED) 2022 21:36:00 Edionshraddha, Mercy  Vel[place]

versity [facility]

 

                POCT GLUCOSE (AUTOMATED) 2022 16:45:00 Edsofia Mercy  [facility]

[facility]

 

                POCT GLUCOSE (AUTOMATED) 2022 16:45:00 Marcelle Mercy  [facility]

[facility]

 

                PHOSPHORUS      2022 13:17:00 Tabitha [facility]

 

                MAGNESIUM       2022 13:17:00 Tabitha [facility]

 

                BASIC METABOLIC PANEL 2022 13:17:00 Tabitha Sunil  [facility]

[facility] [place]



                (NA, K, CL, CO2, GLUCOSE,                                 Medica

l Branch



                BUN, CREATININE, CA)                                 

 

                BASIC METABOLIC PANEL 2022 13:17:00 Tabitha Sunil  [facility]

[facility] [place]



                (NA, K, CL, CO2, GLUCOSE,                                 Medica

l Branch



                BUN, CREATININE, CA)                                 

 

                MAGNESIUM       2022 13:17:00 Tabitha [facility]

 

                [facility]PHORUS      2022 [facility]:17:00 [facility]

 

                POCT GLUCOSE (AUTOMATED) 2022 12:42:00 Edsofia Mercy  Velte[place]

versity [facility]

 

                POCT GLUCOSE (AUTOMATED) 2022 12:42:00 Edpapowe Mercy  Uni

versity [facility]

 

                POCT GLUCOSE (AUTOMATED) 2022 01:08:00 Marcelle Mercy  Uni

versity of [facility]

 

                POCT GLUCOSE (AUTOMATED) 2022 01:08:00 Swapna Baileylissa Blanco

versity of [facility]

 

                URINALYSIS      2022 22:52:00 [facility]

 

                URINE CULTURE   2022 22:52:00 Tabitha [facility]

 

                URINALYSIS      2022 22:52:00 Tabitha [facility]

 

                URINE CULTURE   2022 22:52:00 Tabitha [facility]

 

                POCT GLUCOSE (AUTOMATED) 2022 21:40:00 Marcelle Mercy  Uni

versity of [facility]

 

                POCT GLUCOSE (AUTOMATED) 2022 21:40:00 Marcelle Mercy  Uni

versity of [facility]

 

                POCT GLUCOSE (AUTOMATED) 2022 16:02:00 Marcelle Mercy  Uni

versity of [facility]

 

                POCT GLUCOSE (AUTOMATED) 2022 16:02:00 Marcelle Mercy  Uni

versity of [facility]

 

                POCT GLUCOSE (AUTOMATED) 2022 13:08:00 Marcelle Mercy  Uni

versity of [facility]

 

                POCT GLUCOSE (AUTOMATED) 2022 13:08:00 Marcelle Mercy  Uni

versity of [facility]

 

                POCT GLUCOSE (AUTOMATED) 2022 10:58:00 Marcelle Mercy  Uni

versity of [facility]

 

                POCT GLUCOSE (AUTOMATED) 2022 10:58:00 Marcelle Mercy  Uni

versity of [facility]

 

                LACTIC ACID WHOLE BLOOD 2022 09:47:00 Refugio Moran [facility]

 

                LACTIC ACID WHOLE BLOOD 2022 09:47:00 Refugio Moran [facility]

 

                PHOSPHORUS      2022 09:46:00 Refugio Moran [facility]

 

                MAGNESIUM       2022 09:46:00 Abdullah, [facility]

 

                COMP. METABOLIC PANEL 2022 09:46:00 Refugio Moran [facility]



                ([id number])                                         [facility]

 

                CBC WITH DIFF   2022 09:46:00 Dean [facility]

 

                CBC WITH DIFF   2022 09[facility]46[facility]

 

                COMP. METABOLIC PANEL 2022 09:46:00 Refugio Moran [facility]



                ([id number])                                         Medical Branch

 

                MAGNESIUM       2022 09:46:00 Dean [facility]

 

                PHOSPHORUS      2022 09:46:00 Dean [facility]

 

                POCT GLUCOSE (AUTOMATED) 2022 07:47:00 Rachel Bailey  [facility]

[facility]

 

                POCT GLUCOSE (AUTOMATED) 2022 07:47:00 Rachel Bailey  [facility]

[facility]

 

                POCT GLUCOSE (AUTOMATED) 2022 03:40:00 EdRachel black  [facility]

[facility]

 

                POCT GLUCOSE (AUTOMATED) 2022 03:40:00 Rachel Bailey  [place]

[facility]

 

                POCT GLUCOSE (AUTOMATED) 2022 00:43:00 Rachel Bailey  [facility]

[facility]

 

                POCT GLUCOSE (AUTOMATED) 2022 00:43:00 Rachel Bailey  [place]

[facility]

 

                BASIC METABOLIC PANEL 2022 23:29:00 Refugio Moran [facility]



                (NA, K, CL, CO2, GLUCOSE,                                 Medica

l Branch



                BUN, CREATININE, CA)                                 

 

                BASIC METABOLIC PANEL 2022 23:29:00 Refugio Moran [facility]



                (NA, K, CL, CO2, GLUCOSE,                                 Medica

l Branch



                BUN, CREATININE, CA)                                 

 

                POCT GLUCOSE (AUTOMATED) 2022 22:28:00 EdRachel black  [place]

[facility]

 

                POCT GLUCOSE (AUTOMATED) 2022 22:28:00 Rachel Bailey  [place]

[facility]

 

                US RETROPERITONEAL 2022 22:27:00 Refugio Moran [facility]



                COMPLETE                                        [facility]

 

                US RETROPERITONEAL 2022 22:27:00 Refugio Moran [facility]



                COMPLETE                                        [facility]

 

                CT ABDOMEN PELVIS WO 2022 20:05:00 Refugio Moran [facility]



                CONTRAST                                        [facility]

 

                CT ABDOMEN PELVIS WO 2022 20:05:00 Refugio Moran [facility]



                CONTRAST                                        [facility]

 

                POCT GLUCOSE (AUTOMATED) 2022 19:07:00 Marcelle Swapnay  [place]

[facility]

 

                POCT GLUCOSE (AUTOMATED) 2022 19:07:00 Marcelle Swapnay  [facility]

 

                POCT GLUCOSE (AUTOMATED) 2022 18:00:00 Marcelle Mercy  [place]

[facility]

 

                POCT GLUCOSE (AUTOMATED) 2022 18:00:00 Rachel Bailey  [facility]

 

                PROTEIN CREAT RATIO URINE 2022 17:45:00 Corby Peña 

[facility]



                RANDOM                                          [facility]

 

                PROTEIN CREAT RATIO URINE 2022 17:45:00 Corby Peña 

[facility]

 

                POCT GLUCOSE (AUTOMATED) 2022 17:25:00 Marcelle Mercy  [facility]

 

                POCT GLUCOSE (AUTOMATED) 2022 17:25:00 Rachel Bailey  [facility]

 

                LACTIC ACID WHOLE BLOOD 2022 17:23:00 Refugio Moran [facility]

 

                LACTIC ACID WHOLE BLOOD 2022 17:23:00 Refugio Moran [facility]

 

                BASIC METABOLIC PANEL 2022 17:22:00 Refugio Moran [facility]



                (NA, K, CL, CO2, GLUCOSE,                                 Medica

l Branch



                BUN, CREATININE, CA)                                 

 

                BASIC METABOLIC PANEL 2022 17:22:00 Refugio Moran [facility]



                (NA, K, CL, CO2, GLUCOSE,                                 Medica

l Branch



                BUN, CREATININE, CA)                                 

 

                POCT GLUCOSE (AUTOMATED) 2022 16:07:00 Edsofia, [facility]cy  [facility]

 

                POCT GLUCOSE (AUTOMATED) 2022 16:07:00 Edsofia, Mercy  [facility]

[facility]

 

                POCT GLUCOSE (AUTOMATED) 2022 15:04:00 Edionshraddha Mercy  Vel[place]

[facility]

 

                POCT GLUCOSE (AUTOMATED) 2022 15:04:00 Edionshraddha Mercy  [place]

[facility]

 

                POCT GLUCOSE (AUTOMATED) 2022 13:57:00 Edionshraddha Mercy  [facility]

[facility]

 

                POCT GLUCOSE (AUTOMATED) 2022 13:57:00 Marcelle Mercy  [facility]

 

                URINE CULTURE   2022 13:50:00 [facility]

 

                URINE CULTURE   2022 13:50:00 [facility]

 

                LACTIC ACID WHOLE BLOOD 2022 12:57:00 Edsofia [facility]

 

                LACTIC ACID WHOLE BLOOD 2022 12:57:00 Edsofia Verbling  [facility]

 

                POCT GLUCOSE (AUTOMATED) 2022 12:55:00 Edsofia Mercy  [place]

[facility]

 

                POCT GLUCOSE (AUTOMATED) 2022 12:55:00 Marcelle Mercy  [place]

[facility]

 

                BASIC METABOLIC PANEL 2022 12:51:00 Marcelle [facility]



                (NA, K, CL, CO2, GLUCOSE,                                 Medica

l Branch



                BUN, CREATININE, CA)                                 

 

                BASIC METABOLIC PANEL 2022 12:51:00 Edsofia [facility]



                (NA, K, CL, CO2, GLUCOSE,                                 Medica

l Branch



                BUN, CREATININE, CA)                                 

 

                MRSA / MSSA SCREEN BY 2022 10:37:00 Edionwe, [facility]

 

                MRSA / MSSA SCREEN BY 2022 10:37:00 Edionshraddha, [facility]



                PCR, NARES                                      [facility]

 

                URINE DRUG (IMMUNOASSAY) 2022 10:36:00 Marcelle [facility] DRUG                                 [facility]



                SCREEN                                          

 

                LACTIC ACID WHOLE BLOOD 2022 10:36:00 Marcelle [facility]

 

                URINE DRUG (LCMSMS) - 2022 10:36:00 Marcelle [facility]



                SYNTHETIC OPIATES PANEL                                 [facility]

 

                LACTIC ACID WHOLE BLOOD 2022 10:36:00 Marcelle [facility]

 

                URINE DRUG (IMMUNOASSAY) 2022 10:36:00 Marcelle [facility]



                SCREEN                                          

 

                URINE DRUG (LCMSMS) - 2022 [facility]:36:00 [facility]



                OPIATES [facility]                                   Medical Branch

 

                PHOSPHORUS      2022 08:58:00 Marcelle [facility]

 

                MAGNESIUM       2022 08:58:00 [facility]

 

                BETA HYDROXY-BUTYRATE 2022 08:58:00 Ashly Ortega [facility]

 

                BASIC METABOLIC PANEL 2022 08:58:00 [facility]



                (NA, K, CL, CO2, GLUCOSE,                                 Medica

l Branch



                BUN, CREATININE, CA)                                 

 

                BETA HYDROXY-BUTYRATE 2022 08:58:00 Ashly Ortega  [facility]

 

                BASIC METABOLIC PANEL 2022 08:58:00 [facility]



                (NA, K, CL, CO2, GLUCOSE,                                 Medica

l Branch



                BUN, CREATININE, CA)                                 

 

                MAGNESIUM       2022 08:58:00 [facility]

 

                PHOSPHORUS      2022 08:58:00 [facility]

 

                CBC WITH DIFF   2022 06:40:00 [facility]

 

                LACTIC ACID WHOLE BLOOD 2022 06:40:00 Edionwe, [facility]

 

                LACTIC ACID WHOLE BLOOD 2022 06:40:00 Marcelle [facility]

 

                CBC WITH DIFF   2022 06:40:00 Marcelle [facility]

 

                POCT GLUCOSE (AUTOMATED) 2022 05:14:00 Marcelle [facility]

 

                POCT GLUCOSE (AUTOMATED) 2022 05:14:00 Marcelle [facility]

 

                ED BLADDER      2022 04:35:00 Ashly Ortega  [facility]

 

                ED BLADDER      2022 04:35:00 Ashly Ortega  [facility]

 

                POCT GLUCOSE (AUTOMATED) 2022 03:51:00 Marcelle [facility]

 

                POCT GLUCOSE (AUTOMATED) 2022 03:51:00 [facility]

 

                XR CHEST 1 VW   2022 02:43:07 Ashly Ortega  [facility]

 

                XR ANKLE 3+ VW LEFT 2022 02:43:07 [facility]

 

                XR CHEST 1 VW   2022 02:43:07 Ashly Ortega  [facility]

 

                XR ANKLE 3+ VW LEFT 2022 02:43:07 Ashly Ortega  [facility]

 

                LACTIC ACID WHOLE BLOOD 2022 02:30:00 Ashly Ortega  [facility]

 

                LACTIC ACID WHOLE BLOOD 2022 02:30:00 Ashly Ortega  [facility]

 

                URINALYSIS      2022 01:38:00 Ashly Ortega  [facility]

 

                URINALYSIS      2022 01:38:00 Ashly Ortega  [facility]

 

                POCT GLUCOSE (AUTOMATED) 2022 01:36:00 Ashly Ortega  [facility]

 

                POCT GLUCOSE (AUTOMATED) 2022 01:36:00 Ashly Ortega  [facility]

versity [facility]

 

                PHOSPHORUS      2022 01:24:00 Ashly Ortega  [facility]

 

                LIPASE          2022 01:24:00 Ashly Ortega  [facility]

 

                MAGNESIUM       2022 01:24:00 Ashly Ortega  [facility]

 

                TROPONIN I      2022 01:24:00 Ashly Ortega  [facility]

 

                COMP. METABOLIC PANEL 2022 01:24:00 Ashly Ortega  [facility]



                ([id number])                                         [facility]

 

                SALICYLATE      2022 01:24:00 Ashly Ortega  [facility]

 

                ETHANOL         2022 01:24:00 Ashly Ortega  [facility]

 

                COMP. METABOLIC PANEL 2022 01:24:00 Ashly Ortega  [facility]



                ([id number])                                         [facility]

 

                LIPASE          2022 01:24:00 Ashly Ortega  [facility]

 

                TROPONIN I      2022 01:24:00 Ashly Ortega  [facility]

 

                ETHANOL         2022 01:24:00 Ashly Ortega  [facility]

 

                ACETAMINOPHEN   2022 01:24:00 Ashly Ortega  [facility]

 

                PHOSPHORUS      2022 01:24:00 Ashly Ortega  [facility]

 

                MAGNESIUM       2022 01:24:00 Ashly Ortega  [facility]

 

                CT LUMBAR SPINE WO 2022 01:16:52 Ashly Ortega  [facility]



                CONTRAST                                        [facility]

 

                CT THORACIC SPINE WO 2022 01:16:52 Ashly Ortega  [facility]



                CONTRAST                                        [facility]

 

                CT THORACIC SPINE WO 2022 01:16:52 Ashly Ortega  [facility]



                CONTRAST                                        [facility]

 

                CT LUMBAR SPINE WO 2022 01:16:52 Ash[facility] Ortega  [facility] [place]



                CONTRAST                                        Medical Branch

 

                CT CERVICAL SPINE WO 2022 01:16:24 Ashly Ortega  [facility]

ity [facility] [place] [facility]



                CONTRAST                                        Medical Branch

 

                CT HEAD WO CONTRAST 2022 01:16:24 Ashly Ortega  [facility]

[facility] [place]



                                                                [facility] [place]

 

                CT HEAD WO CONTRAST 2022 01:16:24 Ashly Ortega  [facility]

 

                CT CERVICAL SPINE WO 2022 01:16:24 Ashly Ortega  [facility]



                CONTRAST                                        Medical Branch

 

                BLOOD CULTURE SCREEN 2022 00:41:00 Ashly Ortega  [facility]



                                                                [facility] [place]

 

                BLOOD CULTURE SCREEN 2022 00:41:00 Ashly Ortega  [facility]

 

                COVID-19 (ID NOW RAPID 2022 00:30:00 Ashly Ortega  [facility]



                TESTING)                                        Medical Branch

 

                LAB ONLY COVID  2022 00:30:00 Ashly Ortega  [facility]



                INTERPRETATION                                  [facility]

 

                COVID-19 (ID NOW RAPID 2022 00:30:00 Ashly Ortega  [facility]



                TESTING)                                        Medical Branch

 

                LAB ONLY COVID  2022 00:30:00 Ashly Ortega  [facility]

 

                POCT GLUCOSE (AUTOMATED) 2022 00:05:00 Ashly Ortega  [facility]

 

                POCT GLUCOSE (AUTOMATED) 2022 00:05:00 Ashly Ortega  [facility]

 

                ACUTE CARE VENOUS BLOOD 2022 23:53:00 Ashly Ortega  [facility]

 

                ACUTE CARE VENOUS BLOOD 2022 23:53:00 Ashly Ortega  [facility]

 

                CBC WITH DIFF   2022 23:48:00 Ashly Ortega  [facility]

 

                CBC WITH DIFF   2022 23:48:00 Ashly Ortega  [facility]

 

                LACTIC ACID WHOLE BLOOD 2022 23:47:00 Ashly Ortega  [facility]

 

                LACTIC ACID WHOLE BLOOD 2022 23:47:00 Ashly Ortega  [facility]

 

                HB ECG ROUTINE & RHYTHM 2022 23:31:40 Ashly Ortega [facility]

 

                HB ECG ROUTINE & RHYTHM 2022 23:31:40 Ashly Ortega [facility]

 

                EMERGENCY DEPARTMENT 2022 05:01:00 Doctor Unassigned, No [facility]

[facility] 

[place]



                DOCUMENTS                       Name            [facility] [place]

 

                EMERGENCY DEPARTMENT 2022 05:01:00 Doctor Unassigned, No [facility]

[facility] 

[place]



                DOCUMENTS                       [facility]

 

                HOSPITAL ADMISSION 2022 05:01:00 Doctor Unassigned, No Uni

versity of [facility]

 

                CT FEMUR LEFT W CONTRAST 2022 02:34:07 Varinder Sol Uni

versity [facility]

 

                CT FEMUR LEFT W CONTRAST 2022 02:34:07 [facility]inder [facility]

[facility]

 

                POCT GLUCOSE(AGE >30DAYS) 2022 01:30:00 [facility]

 

                POCT GLUCOSE(AGE >30DAYS) 2022 01:30:00 [facility]

 

                POCT GLUCOSE (AUTOMATED) 2022 01:28:00 Alondra Santos

[facility]

 

                POCT GLUCOSE (AUTOMATED) 2022 01:28:00 Alondra Santos [facility]

 

                POCT GLUCOSE (AUTOMATED) 2022 00:38:00 Alondra Santos [facility]

 

                POCT GLUCOSE (AUTOMATED) 2022 00:38:00 Alondra Santos [facility]

 

                URINALYSIS      2022-07-15 23:58:00 Alondra Santos [facility]

 

                URINALYSIS      2022-07-15 23:58:00 lAondra Santos [facility]

 

                POCT GLUCOSE (AUTOMATED) 2022-07-15 23:26:00 Alondra Santos [facility]

 

                POCT GLUCOSE (AUTOMATED) 2022-07-15 23:26:00 Alondra Santos U

[facility]



                                                                [facility] [place]

 

                BASIC METABOLIC PANEL 2022-07-15 22:24:00 Alondra Santos [facility]



                (NA, K, CL, CO2, GLUCOSE,                                 Medica

l Branch



                BUN, CREATININE, CA)                                 

 

                CBC WITH DIFF   2022-07-15 22:24:00 Tom [facility]



                                                                Medical Branch

 

                COVID-19 (ID NOW RAPID 2022-07-15 22:24:00 Alondra Santos [facility]



                TESTING)                                        Medical Branch

 

                CBC WITH DIFF   2022-07-15 22:24:00 Tom [facility]

 

                BASIC METABOLIC PANEL 2022-07-15 22:24:00 Tom [facility]



                (NA, K, CL, CO2, GLUCOSE,                                 Medica

l Branch



                BUN, CREATININE, CA)                                 

 

                COVID-19 (ID NOW RAPID 2022-07-15 22:24:00 Alondra Santos [facility]



                TESTING)                                        Medical Branch

 

                LAB ONLY COVID  2022-07-15 22:24:00 Tom [facility]



                INTERPRETATION                                  [facility]

 

                EMERGENCY SERVICES 2022-07-15 05:01:00 Doctor Unassigned, No [facility]



                AGREEMENTS AND                  Name            Medical Branch



                AUTHORIZATIONS                                  

 

                EMERGENCY DEPARTMENT 2022-07-15 05:01:00 Doctor Unassigned, No [facility]



                DOCUMENTS                       Name            Medical Branch

 

                LIPASE          2022 10:37:00 Radha Fofana [facility]

 

                COMP. METABOLIC PANEL 2022 10:37:00 Radha Fofana [facility]



                ([id number])                                         Medical Branch

 

                CBC WITH DIFF   2022 10:37:00 Radha Fofana [facility]

 

                AC PANEL 21 + LACTIC ACID 2022 10:37:00 Radha Fofana

[facility]

 

                CBC WITH DIFF   2022 10:37:00 Radha Fofana [facility]

 

                COMP. METABOLIC PANEL 2022 10:37:00 Radha Fofana [facility]



                ([id number])                                         Medical Branch

 

                AC PANEL 21 + LACTIC ACID 2022 10:37:00 [facility]

[facility]



                                                                [facility]

 

                LIPASE          2022 10:37:00 Radha Fofana o

f [facility]

 

                URINALYSIS      2022 10:24:00 Radha Fofana [place] o

f [facility]

 

                URINALYSIS      2022 10:24:00 Radha Fofana o

f [facility]

 

                EMERGENCY SERVICES 2022 05:01:00 Doctor Unassigned, No Uni

[facility] [place]



                AGREEMENTS AND                  Name            Medical Branch



                AUTHORIZATIONS                                  

 

                POCT GLUCOSE (AUTOMATED) 2022 22:53:00 Radha Fofana Uni

[facility] [place]



                                                                Medical Branch

 

                POCT GLUCOSE (AUTOMATED) 2022 22:53:00 Radha Fofana Uni

[facility]

 

                POCT GLUCOSE (AUTOMATED) 2022 12:17:00 Radha Fofana Uni

[facility] of [facility]

 

                POCT GLUCOSE (AUTOMATED) 2022 12:17:00 Radha Fofana Uni

[facility]

 

                POCT GLUCOSE (AUTOMATED) 2022 04:47:00 Radha Fofana Uni

[facility]

 

                POCT GLUCOSE (AUTOMATED) 2022 04:47:00 Radha Fofana Uni

[facility]

 

                POCT GLUCOSE (AUTOMATED) 2022 01:43:00 Radha Fofana Uni

[facility] [place]



                                                                Medical Branch

 

                POCT GLUCOSE (AUTOMATED) 2022 01:43:00 Radha Fofana Uni

[facility]

 

                POCT GLUCOSE (AUTOMATED) 2022 21:51:00 Radha Fofana Uni

[facility]

 

                POCT GLUCOSE (AUTOMATED) 2022 21:51:00 Radha Fofana Uni

[facility] [place]



                                                                Medical Branch

 

                POCT GLUCOSE (AUTOMATED) 2022 17:04:00 Radha Fofana Uni

[facility] [place]



                                                                Medical Branch

 

                POCT GLUCOSE (AUTOMATED) 2022 17:04:00 Radha Fofana Uni

[facility]

 

                POCT GLUCOSE (AUTOMATED) 2022 13:09:00 Radha Fofana Uni

[facility]

 

                POCT GLUCOSE (AUTOMATED) 2022 13:09:00 Radha Fofana Uni

[facility]

 

                POCT GLUCOSE (AUTOMATED) 2022 08:32:00 Radha Fofana

[facility]

 

                POCT GLUCOSE (AUTOMATED) 2022 08:32:00 Radha Fofana

[facility]

 

                POCT GLUCOSE (AUTOMATED) 2022 05:45:00 Radha Fofana

[facility]

 

                POCT GLUCOSE (AUTOMATED) 2022 05:45:00 Radha Fofana

[facility]

 

                POCT GLUCOSE (AUTOMATED) 2022 02:43:00 Radha Fofana

[facility]

 

                POCT GLUCOSE (AUTOMATED) 2022 02:43:00 Radha Fofana

[facility]

 

                POCT GLUCOSE (AUTOMATED) 2022 22:37:00 Radha Fofana

[facility]

 

                POCT GLUCOSE (AUTOMATED) 2022 22:37:00 Radha Fofana

[facility]

 

                POCT GLUCOSE (AUTOMATED) 2022 18:04:00 Radha Fofana

[facility]

 

                POCT GLUCOSE (AUTOMATED) 2022 18:04:00 Radha Fofana

[facility]

 

                BASIC METABOLIC PANEL 2022 14:22:00 [facility]



                (NA, K, CL, CO2, GLUCOSE,                                 Medica

l Branch



                BUN, CREATININE, CA)                                 

 

                BASIC METABOLIC PANEL 2022 14:22:00 [facility]



                (NA, K, CL, CO2, GLUCOSE,                                 Medica

l Branch



                BUN, CREATININE, CA)                                 

 

                POCT GLUCOSE (AUTOMATED) 2022 13:30:00 Radha Fofana

[facility]

 

                POCT GLUCOSE (AUTOMATED) 2022 13:30:00 Radha Fofana

[facility]

 

                CRITICAL CARE   2022 11:11:00 Radha Fofana [facility]

 

                CRITICAL CARE   2022 11:11:00 Radha Fofana [facility]

 

                POCT GLUCOSE (AUTOMATED) 2022 11:01:00 Radha Fofana

[facility]

 

                POCT GLUCOSE (AUTOMATED) 2022 11:01:00 Radha Fofana

[facility]

 

                POCT GLUCOSE (AUTOMATED) 2022 07:19:00 Radha Fofana [facility]

 

                POCT GLUCOSE (AUTOMATED) 2022 07:19:00 Radha Fofana

[facility]

 

                BASIC METABOLIC PANEL 2022 05:53:00 Radha Fofana [facility]



                (NA, K, CL, CO2, GLUCOSE,                                 Medica

l Branch



                BUN, CREATININE, CA)                                 

 

                BASIC METABOLIC PANEL 2022 05:53:00 Radha Fofana [facility]



                (NA, K, CL, CO2, GLUCOSE,                                 Medica

l Branch



                BUN, CREATININE, CA)                                 

 

                POCT GLUCOSE (AUTOMATED) 2022 04:41:00 Radha Fofana [facility]

 

                POCT GLUCOSE (AUTOMATED) 2022 04:41:00 Radha Fofana [facility]

 

                URINALYSIS      2022 03:59:00 Radha Fofana [facility]

 

                URINE CULTURE   2022 03:59:00 Radha Fofana [facility]

 

                URINALYSIS      2022 03:59:00 Radha Fofana [facility]

 

                URINE CULTURE   2022 03:59:00 Radha Fofana [facility]

 

                POCT GLUCOSE (AUTOMATED) 2022 03:48:00 Radha Fofana [facility]

 

                POCT GLUCOSE (AUTOMATED) 2022 03:48:00 Radha Fofana

[facility]

 

                AC PANEL 21 + LACTIC ACID 2022 02:23:00 [facility]

 

                AC PANEL 21 + LACTIC ACID 2022 02:23:00 Radha Fofana

[facility]

 

                LIPASE          2022 02:22:00 Radha Fofana [facility]

 

                COMP. METABOLIC PANEL 2022 02:22:00 Radha Fofana [facility]



                ([id number])                                         Medical Branch

 

                CBC WITH DIFF   2022 02:22:00 Fofana, [facility]

 

                COVID-19 (ID NOW RAPID 2022 02:22:00 Radha Fofana [facility]



                TESTING)                                        Medical Branch

 

                LAB ONLY COVID  2022 02:22:00 Radha Fofana [facility]                                  Medical Branch

 

                CBC WITH DIFF   2022 02:22:00 Brigido [facility]

 

                COMP. METABOLIC PANEL 2022 02:22:00 Radha Fofana [facility]



                ([id number])                                         Medical Branch

 

                LIPASE          2022 02:22:00 Brigido [facility]

 

                COVID-19 (ID NOW RAPID 2022 02:22:00 Brigido Radha [facility]



                TESTING)                                        Medical Branch

 

                LAB ONLY COVID  2022 02:22:00 Radha Fofana [facility]

 

                HOSPITAL ADMISSION 2022 05:01:00 Doctor Unassigned, No Uni

[facility]

 

                EMERGENCY DEPARTMENT 2022 05:01:00 Doctor Unassigned, No U

[facility] 

[facility]

 

                HOSPITAL ADMISSION 2022 05:01:00 Doctor Unassigned, No Uni

[facility]

 

                POCT GLUCOSE (AUTOMATED) 2022 17:03:00 Edwardo Lopez   Uni

[facility]

 

                POCT GLUCOSE (AUTOMATED) 2022 16:09:00 [facility]   [facility]

 

                HEPATIC FUNCTION PANEL 2022 09:01:00 Ashly Lees [facility]



                ([id number]) (ALB,T.PRO,BILI                                 Medical 

Branch



                T,BU/BC,ALT,AST,ALK PHOS)                                 

 

                BASIC METABOLIC PANEL 2022 09:01:00 [facility] 

[facility]



                (NA, K, CL, CO2, GLUCOSE,                                 Medica

l Branch



                BUN, CREATININE, CA)                                 

 

                CBC WITH DIFF   2022 09:01:00 [facility]

 

                CBC WITH DIFF   2022 09:01:00 [facility]

 

                BASIC METABOLIC PANEL 2022 09:01:00 Ashly Lees [facility]



                (NA, K, CL, CO2, GLUCOSE,                                 Medica

l Branch



                BUN, CREATININE, CA)                                 

 

                HEPATIC FUNCTION PANEL 2022 09:01:00 Ashly Lees [facility]



                ([id number]) (ALB,T.PRO,BILI                                 Medical 

Branch



                T,BU/BC,ALT,AST,ALK PHOS)                                 

 

                POCT GLUCOSE (AUTOMATED) 2022 08:58:00 Edwardo Lopez   [facility]

 

                POCT GLUCOSE (AUTOMATED) 2022 06:53:00 Edwardo Lopez   [facility]

 

                POCT GLUCOSE (AUTOMATED) 2022 05:31:00 Edwardo Lopez

[facility]

 

                POCT GLUCOSE (AUTOMATED) 2022 02:16:00 Edwardo Lopez   [facility]

 

                POCT GLUCOSE (AUTOMATED) 2022 21:50:00 Edwardo Lopez

[facility]

 

                BASIC METABOLIC PANEL 2022 20:45:00 [facility]



                (NA, K, CL, CO2, GLUCOSE,                                 Medica

l Branch



                BUN, CREATININE, CA)                                 

 

                CBC WITH DIFF   2022 20:45:00 [facility]

 

                BASIC METABOLIC PANEL 2022 20:45:00 [facility]



                (NA, K, CL, CO2, GLUCOSE,                                 Medica

l Branch



                BUN, CREATININE, CA)                                 

 

                CBC WITH DIFF   2022 20:45:00 [facility]

 

                POCT GLUCOSE (AUTOMATED) 2022 16:44:00 Edwardo Lopez   [facility]

 

                POCT GLUCOSE (AUTOMATED) 2022 13:41:00 Edwardo Lopez

[facility]

 

                URINE CULTURE   2022 06:41:00 Josse Giles [facility]

 

                URINE CULTURE   2022 06:41:00 Josse Giles [facility]

 

                POCT GLUCOSE (AUTOMATED) 2022 06:10:00 [facility] 

[facility]

 

                CT ABDOMEN PELVIS W 2022 05:08:00 Josse Giles [facility]



                CONTRAST                                        [facility]

 

                CT ABDOMEN PELVIS W 2022 05:08:00 Josse Giles [facility]



                CONTRAST                                        [facility]

 

                URINALYSIS      2022 04:34:00 Josse Giles [facility]

 

                COVID-19 (ID NOW RAPID 2022 04:34:00 Josse Giles [facility]



                TESTING)                                        Medical Branch

 

                LAB ONLY COVID  2022 04:34:00 Josse Giles [facility]



                INTERPRETATION                                  [facility]

 

                URINALYSIS      2022 04:34:00 Josse Giles [facility]

 

                COVID-19 (ID NOW RAPID 2022 04:34:00 Josse Giles [facility]



                TESTING)                                        Medical Branch

 

                LAB ONLY COVID  2022 04:34:00 Josse Giles [facility]



                INTERPRETATION                                  [facility]

 

                LIPASE          2022 03:57:00 Josse Giles [facility]

 

                COMP. METABOLIC PANEL 2022 03:57:00 Josse Giles [facility]



                ([id number])                                         [facility]

 

                CBC WITH DIFF   2022 03:57:00 Josse Giles [facility]

 

                CBC WITH DIFF   2022 03:57:00 Josse Giles [facility]

 

                COMP. METABOLIC PANEL 2022 03:57:00 Josse Giles [facility]



                ([id number])                                         [facility]

 

                LIPASE          2022 03:57:00 Josse Giles [facility]

 

                EMERGENCY DEPARTMENT 2022 05:01:00 Doctor Unassigned, No U

[facility]

 

                HOSPITAL ADMISSION 2022 05:01:00 Doctor Unassigned, No Uni

[facility]

 

                POCT GLUCOSE (AUTOMATED) 2022-06-10 21:48:00 Rachel Bailey  Uni

[facility]

 

                POCT GLUCOSE (AUTOMATED) 2022-06-10 17:03:00 Edionshraddha Mercy  Uni

[facility]

 

                POCT GLUCOSE (AUTOMATED) 2022-06-10 17:03:00 Edsofia Mercy  Uni

[facility] [place]



                                                                Medical Branch

 

                POCT GLUCOSE (AUTOMATED) 2022-06-10 12:44:00 Edsofia Mercy  Uni

[facility]

 

                POCT GLUCOSE (AUTOMATED) 2022-06-10 12:44:00 EdionRachel llanes  Uni

[facility]

 

                BASIC METABOLIC PANEL 2022-06-10 10:29:00 John Edwardo   [facility]



                (NA, K, CL, CO2, GLUCOSE,                                 Medica

l Branch



                BUN, CREATININE, CA)                                 

 

                CBC WITH DIFF   2022-06-10 10:29:00 John [facility]

 

                MAGNESIUM       2022-06-10 10:29:[facility]

 

                MAGNESIUM       2022-06-10 10:29:00 John [facility]

 

                BASIC METABOLIC PANEL 2022-06-10 10:29:00 [facility]



                (NA, K, CL, CO2, GLUCOSE,                                 Medica

l Branch



                BUN, CREATININE, CA)                                 

 

                CBC WITH DIFF   2022-06-10 10:29:00 [facility]

 

                POCT GLUCOSE (AUTOMATED) 2022-06-10 10:28:00 Marcelle [facility]  Uni

[facility]

 

                POCT GLUCOSE (AUTOMATED) 2022-06-10 10:28:00 EdRachel black  Uni

[facility]

 

                POCT GLUCOSE (AUTOMATED) 2022-06-10 05:33:00 Edionshraddha Mercy  Uni

[facility]

 

                POCT GLUCOSE (AUTOMATED) 2022-06-10 05:33:00 Edionwe [facility]  Uni

[facility]

 

                POCT GLUCOSE (AUTOMATED) 2022-06-10 04:37:00 Edionwe, [facility]  Uni

[facility]

 

                POCT GLUCOSE (AUTOMATED) 2022-06-10 04:37:00 Edionwe [facility]  Uni

[facility]

 

                POCT GLUCOSE (AUTOMATED) 2022-06-10 02:29:00 Edsofia [facility]cy  Uni

[facility]

 

                POCT GLUCOSE (AUTOMATED) 2022-06-10 02:29:00 Edionwe, [facility]  Uni

[facility]

 

                POCT GLUCOSE (AUTOMATED) 2022-06-10 00:52:00 Edionwe [facility]  Uni

[facility]

 

                POCT GLUCOSE (AUTOMATED) 2022-06-10 00:52:00 EdionweSwapnay  Uni

[facility]

 

                POCT GLUCOSE (AUTOMATED) 2022 21:52:00 Edionwe, [facility]  Uni

[facility]

 

                POCT GLUCOSE (AUTOMATED) 2022 21:52:00 Edionwe, [facility]  Uni

[facility]

 

                POCT GLUCOSE (AUTOMATED) 2022 16:42:00 Edionshraddha, [facility]  Uni

[facility]

 

                POCT GLUCOSE (AUTOMATED) 2022 16:42:00 Edsofia, [facility]  Uni

[facility]

 

                XR CHEST 1 VW   2022 14:05:00 [place] [facility]

 

                XR SKULL <4 VW  2022 14:05:00 [place] [facility]

 

                XR ABDOMEN 2 VW 2022 14:05:00 [place] [facility]

 

                XR ABDOMEN 2 VW 2022 14:05:00 [place] [facility]

 

                XR CHEST 1 VW   2022 14:05:00 [place] [facility]

 

                XR SKULL <4 VW  2022 14:05:00 John [facility]

 

                POCT GLUCOSE (AUTOMATED) 2022 12:47:00 Edsofia, [facility]  Uni

[facility]

 

                POCT GLUCOSE (AUTOMATED) 2022 12:47:00 Edsofia, [facility]  Uni

[facility]

 

                POCT GLUCOSE (AUTOMATED) 2022 08:59:00 Edsofia [facility]cy  Uni

[facility]

 

                POCT GLUCOSE (AUTOMATED) 2022 08:59:00 Edsofia [facility]  Uni

[facility]

 

                GLYCOSYLATED HEMOGLOBIN 2022 08:56:00 Refugio Moran [facility]



                (A1C)                                           Medical Branch

 

                CBC [facility] DIFF   2022 [facility]:56[facility]

 

                BASIC METABOLIC PANEL 2022 08:56:00 Refugio Moran [facility]



                (NA, K, CL, CO2, GLUCOSE,                                 Medica

l Branch



                BUN, CREATININE, CA)                                 

 

                MAGNESIUM       2022 08:56:00 [facility]

 

                PHOSPHORUS      2022 08:56:00 [facility]

 

                PHOSPHORUS      2022 08:56:00 [facility]

 

                MAGNESIUM       2022 08:56:00 [facility]

 

                BASIC METABOLIC PANEL 2022 08:56:00 Refugio Moran [facility]



                (NA, K, CL, CO2, GLUCOSE,                                 Medica

l Branch



                BUN, CREATININE, CA)                                 

 

                CBC WITH DIFF   2022 08:56:00 [facility]

 

                GLYCOSYLATED HEMOGLOBIN 2022 08:56:00 Dean [facility]



                (A1C)                                           [facility]

 

                POCT GLUCOSE (AUTOMATED) 2022 04:27:00 Rachel Bailey  Uni

[facility]

 

                POCT GLUCOSE (AUTOMATED) 2022 04:27:00 Rachel Bailey  [facility]

[facility]

 

                POCT GLUCOSE (AUTOMATED) 2022 01:11:00 Rachel Bailey  Uni

[facility]

 

                POCT GLUCOSE (AUTOMATED) 2022 01:11:00 EdRachel black  Uni

[facility]

 

                POCT GLUCOSE (AUTOMATED) 2022 21:02:00 Rachel Bailey  [facility]

[facility]

 

                POCT GLUCOSE (AUTOMATED) 2022 21:02:00 Rachel Bailey  [facility]

[facility]

 

                POCT GLUCOSE (AUTOMATED) 2022 16:08:00 Rachel Bailey  [facility]

[facility]

 

                POCT GLUCOSE (AUTOMATED) 2022 16:08:00 Edsofia [facility]  [facility]

 

                POCT GLUCOSE (AUTOMATED) 2022 12:39:00 [facility]

 

                POCT GLUCOSE (AUTOMATED) 2022 12:39:00 [facility]  [facility]

 

                LACTIC ACID WHOLE BLOOD 2022 09:25:00 [facility]

 

                CBC WITH DIFF   2022 09:25:00 Edsofia [facility]

 

                BASIC METABOLIC PANEL 2022 09:25:00 [facility]



                (NA, K, CL, CO2, GLUCOSE,                                 Medica

l Branch



                BUN, CREATININE, CA)                                 

 

                BASIC METABOLIC PANEL 2022 09:25:00 [facility]



                (NA, K, CL, CO2, GLUCOSE,                                 Medica

l Branch



                BUN, CREATININE, CA)                                 

 

                CBC WITH DIFF   2022 09:25:00 Marcelle [facility]

 

                LACTIC ACID WHOLE BLOOD 2022 09:25:00 Octavio [facility]

 

                POCT GLUCOSE (AUTOMATED) 2022 08:56:00 [facility]  [facility]

 

                POCT GLUCOSE (AUTOMATED) 2022 08:56:00 [facility]  [facility]

 

                POCT GLUCOSE (AUTOMATED) 2022 04:51:00 [facility]  [facility]

 

                POCT GLUCOSE (AUTOMATED) 2022 04:51:00 Marcelle [facility]

 

                URINALYSIS      2022 02:47:00 Octavio [facility]

 

                URINALYSIS      2022 02:47:00 Octavio [facility]

 

                POCT GLUCOSE (AUTOMATED) 2022 02:29:00 Shelton Cunningham [facility]

 

                POCT GLUCOSE (AUTOMATED) 2022 02:29:00 Shelton Cunningham [facility]

 

                HB ECG ROUTINE & RHYTHM 2022 00:33:38 Octavio [facility]

 

                HB ECG ROUTINE & RHYTHM 2022 00:33:38 Octavio [facility]

 

                URINALYSIS      2022 00:28:00 Octavio [facility]

 

                URINE CULTURE   2022 00:28:00 Octavio [facility]

 

                URINALYSIS      2022 00:28:00 Octavio [facility]

 

                URINE CULTURE   2022 00:28:00 Octavio [facility]

 

                CBC WITH DIFF   2022 00:25:00 Octavio [facility]

 

                COMP. METABOLIC PANEL 2022 00:25:00 [facility]



                ([id number])                                         [facility]

 

                LIPASE          2022 00:25:00 Octavio [facility]

 

                LACTIC ACID WHOLE BLOOD 2022 00:25:00 [facility] [facility]

 

                ACUTE CARE VENOUS BLOOD 2022 00:25:00 [facility]

 

                BLOOD CULTURE SCREEN 2022 00:25:00 [facility]

 

                BLOOD CULTURE SCREEN 2022 00:25:00 [facility]

 

                LIPASE          2022 00:25:00 Octavio [facility]

 

                COMP. METABOLIC PANEL 2022 00:25:00 Octavio [facility]



                ([id number])                                         [facility]

 

                ACUTE CARE VENOUS BLOOD 2022 00:25:00 [facility]

 

                CBC WITH DIFF   2022 00:25:00 Octavio [facility]

 

                LACTIC ACID WHOLE BLOOD 2022 00:25:00 Octavio [facility]

 

                EMERGENCY DEPARTMENT 2022 05:01:00 Doctor Unassigned, No U

[facility] 

[place]



                DOCUMENTS                       [facility]

 

                HOSPITAL ADMISSION 2022 05:01:00 Doctor Unassigned, No Uni

versity of [facility]

 

                CBC WITH DIFF   2022 17:57:00 [facility]

 

                BASIC METABOLIC PANEL 2022 17:57:00 Phillips [facility]

[facility] [place]



                (NA, K, CL, CO2, GLUCOSE,                                 Medica

l Branch



                BUN, CREATININE, CA)                                 

 

                MAGNESIUM       2022 17:57:00 Phillips, [facility]

 

                MAGNESIUM       2022 17:57:00 [facility]

 

                BASIC METABOLIC PANEL 2022 17:57:00 Phillips [facility]

[facility]



                (NA, K, CL, CO2, GLUCOSE,                                 Medica

l Branch



                BUN, CREATININE, CA)                                 

 

                CBC WITH DIFF   2022 17:57:00 [facility]

 

                POCT GLUCOSE (AUTOMATED) 2022 16:49:00 Terminella, Efrain U

niversity of 

[facility]

 

                POCT GLUCOSE (AUTOMATED) 2022 16:49:00 Terminella, Efrain U

niversity of 

[facility]

 

                POCT GLUCOSE (AUTOMATED) 2022 12:56:00 Terminella, Efrain U

niversity of 

[facility]

 

                POCT GLUCOSE (AUTOMATED) 2022 12:56:00 Terminella, Efrain U

niversity of 

[facility]

 

                POCT GLUCOSE (AUTOMATED) 2022 09:41:00 Terminella, Efrain U

niversity of 

[facility]

 

                POCT GLUCOSE (AUTOMATED) 2022 09:41:00 Terminella, Efrain U

niversity of 

[facility]

 

                POCT GLUCOSE (AUTOMATED) 2022 05:08:00 Terminella, Efrain U

niversity of 

[facility]

 

                POCT GLUCOSE (AUTOMATED) 2022 05:08:00 Terminella, Efrain U

niversity of 

[facility]

 

                POCT GLUCOSE (AUTOMATED) 2022 01:26:00 Terminella, Efrain U

niversity of 

[facility]

 

                POCT GLUCOSE (AUTOMATED) 2022 01:26:00 Terminella, Efrain U

niversity of 

[facility]

 

                POCT GLUCOSE (AUTOMATED) 2022 21:41:00 Terminella, Efrain U

niversity of 

[facility]

 

                POCT GLUCOSE (AUTOMATED) 2022 21:41:00 Terminella, Efrain U

niversity of 

[facility]

 

                POCT GLUCOSE (AUTOMATED) 2022 17:07:00 Terminella, Efrain U

niversity of 

[facility]

 

                POCT GLUCOSE (AUTOMATED) 2022 17:07:00 Terminella, Efrain U

niversity of 

[facility]

 

                POCT GLUCOSE (AUTOMATED) 2022 14:01:00 Terminella, Efrain U

niversity of 

[facility]

 

                POCT GLUCOSE (AUTOMATED) 2022 14:01:00 Terminella, Efrain U

niversity of 

[facility]

 

                POCT GLUCOSE (AUTOMATED) 2022 09:18:00 Terminella, Efrain U

niversity of 

[facility]

 

                POCT GLUCOSE (AUTOMATED) 2022 09:18:00 Terminella, Efrain U

niversity of 

[facility]

 

                POCT GLUCOSE (AUTOMATED) 2022 01:43:00 Terminella, Efrain U

niversity of 

[facility]

 

                POCT GLUCOSE (AUTOMATED) 2022 01:43:00 Terminella, Efrain U

niversity of 

[facility]

 

                POCT GLUCOSE (AUTOMATED) 2022 21:24:00 Albustami, Tobias Uni

versity of [facility]

 

                POCT GLUCOSE (AUTOMATED) 2022 21:24:00 Albustami, Tobias Uni

versity of [facility]

 

                POCT GLUCOSE (AUTOMATED) 2022 16:25:00 Albustami, Tobias Uni

versity of [facility]

 

                POCT GLUCOSE (AUTOMATED) 2022 16:25:00 AlbdamianamiTobias Uni

versity of [facility]

 

                URINALYSIS      2022 15:46:00 [facility]

 

                URINE CULTURE   2022 15:46:00 [facility]

 

                URINALYSIS      2022 15:46:00 [facility]

 

                URINE CULTURE   2022 15:46:00 [facility]

 

                POCT GLUCOSE (AUTOMATED) 2022 15:36:00 [facility]ze [facility]

 

                POCT GLUCOSE (AUTOMATED) 2022 15:36:00 Alb[facility]amiTobias [facility]

[facility]

 

                POCT GLUCOSE (AUTOMATED) 2022 14:25:00 AlbdamianamiTobias Uni

[facility]

 

                POCT GLUCOSE (AUTOMATED) 2022 14:25:00 AlbTobias gupta Uni

[facility]

 

                BASIC METABOLIC PANEL 2022 13:45:00 Tobias Hernandez Univer

[facility] [place]



                (NA, K, CL, CO2, GLUCOSE,                                 Medica

l Branch



                BUN, CREATININE, CA)                                 

 

                BASIC METABOLIC PANEL 2022 13:45:00 Albdamianami Tobias Univer

[facility] [place]



                (NA, K, CL, CO2, GLUCOSE,                                 Medica

l Branch



                BUN, CREATININE, CA)                                 

 

                POCT GLUCOSE (AUTOMATED) 2022 13:34:00 Tobias Hernandez Uni

[facility]

 

                POCT GLUCOSE (AUTOMATED) 2022 13:34:00 Tobias Hernandez [facility]

 

                CRITICAL CARE   2022 13:05:05 Radha Fofana [facility]

 

                CRITICAL CARE   2022 13:05:05 Brigido [facility]

 

                POCT GLUCOSE (AUTOMATED) 2022 12:23:00 Tobias Hernandez [facility]

[facility]

 

                POCT GLUCOSE (AUTOMATED) 2022 12:23:00 AlbTobias gupta Uni

[facility]

 

                POCT GLUCOSE (AUTOMATED) 2022 11:24:00 AlbTobias gupta Uni

[facility]

 

                POCT GLUCOSE (AUTOMATED) 2022 11:24:00 Tobias Hernandez

[facility]

 

                POCT GLUCOSE (AUTOMATED) 2022 10:33:00 Tobias Hernandez Bella

[facility]

 

                POCT GLUCOSE (AUTOMATED) 2022 10:33:00 Tobias Hernandez Bella

[facility]

 

                BASIC METABOLIC PANEL 2022 09:32:00 Tobias Hernandez Univer

[facility] [place]



                (NA, K, CL, CO2, GLUCOSE,                                 Medica

l Branch



                BUN, CREATININE, CA)                                 

 

                BASIC METABOLIC PANEL 2022 09:32:00 Tobias Hernandez Univer

[facility] [place]



                (NA, K, CL, CO2, GLUCOSE,                                 Medica

l Branch



                BUN, CREATININE, CA)                                 

 

                POCT GLUCOSE (AUTOMATED) 2022 09:30:00 Tobias Hernandez Bella

[facility]

 

                POCT GLUCOSE (AUTOMATED) 2022 09:30:00 Tobias Hernandez Bella

[facility]

 

                POCT GLUCOSE (AUTOMATED) 2022 08:27:00 AlbTobias gupta Bella

[facility]

 

                POCT GLUCOSE (AUTOMATED) 2022 08:27:00 Tobias Hernandez Bella

[facility]

 

                POCT GLUCOSE (AUTOMATED) 2022 07:23:00 AlbTobias gupta Bella

[facility]

 

                POCT GLUCOSE (AUTOMATED) 2022 07:23:00 Tobias Hernandez Bella

[facility]

 

                POCT GLUCOSE (AUTOMATED) 2022 06:22:00 AlbTobias gupta Bella

[facility]

 

                POCT GLUCOSE (AUTOMATED) 2022 06:22:00 Tobias Hernandez Bella

[facility]

 

                BASIC METABOLIC PANEL 2022 05:30:00 Tobias Hernandez Univer

[facility] [place]



                (NA, K, CL, CO2, GLUCOSE,                                 Medica

l Branch



                BUN, CREATININE, CA)                                 

 

                BASIC METABOLIC PANEL 2022 05:30:00 Tobias Hernandez Univer

[facility] [place]



                (NA, K, CL, CO2, GLUCOSE,                                 Medica

l Branch



                BUN, CREATININE, CA)                                 

 

                POCT GLUCOSE (AUTOMATED) 2022 05:27:00 Albustami Tobias [facility]

 

                POCT GLUCOSE (AUTOMATED) 2022 05:27:00 Albustami, Tobias [facility]

 

                POCT GLUCOSE (AUTOMATED) 2022 04:23:00 Albustami, Tobias [facility]

 

                POCT GLUCOSE (AUTOMATED) 2022 04:23:00 Albustami, Tobias [facility]

 

                POCT GLUCOSE (AUTOMATED) 2022 03:19:00 Albustami, Tobias [facility]

 

                POCT GLUCOSE (AUTOMATED) 2022 03:19:00 Albustami, Tobias [facility]

 

                POCT GLUCOSE (AUTOMATED) 2022 02:24:00 Albustami Tobias [facility]

 

                POCT GLUCOSE (AUTOMATED) 2022 02:24:00 Albustami [facility]

 

                KETONES URINE   2022 01:49:00 [facility]

 

                KETONES URINE   2022 01:49:00 [facility]

 

                BETA HYDROXY-BUTYRATE 2022 01:44:00 Phillips [facility]

 

                BASIC METABOLIC PANEL 2022 01:44:00 [facility]

[facility] [place]



                (NA, K, CL, CO2, GLUCOSE,                                 Medica

l Branch



                BUN, CREATININE, CA)                                 

 

                BETA HYDROXY-BUTYRATE 2022 01:44:00 Phillips, [facility]

 

                BASIC METABOLIC PANEL 2022 01:44:00 [facility]

[facility] [place]



                (NA, K, CL, CO2, GLUCOSE,                                 Medica

l Branch



                BUN, CREATININE, CA)                                 

 

                POCT GLUCOSE (AUTOMATED) 2022 01:24:00 Albustami Tobias [facility]

 

                POCT GLUCOSE (AUTOMATED) 2022 01:24:00 Albustami, Tobias [facility]

 

                POCT GLUCOSE (AUTOMATED) 2022 00:18:00 Yong Tobias [facility]

[facility]

 

                POCT GLUCOSE (AUTOMATED) 2022 00:18:00 Tobias Hernandez Bella

[facility]

 

                POCT GLUCOSE (AUTOMATED) 2022 22:56:00 Tobias Hernandez Bella

[facility]

 

                POCT GLUCOSE (AUTOMATED) 2022 22:56:00 Tobias Hernandez Bella

[facility]

 

                BASIC METABOLIC PANEL 2022 22:03:00 Yong Tobias Univer

[facility] [place]



                (NA, K, CL, CO2, GLUCOSE,                                 Medica

l Branch



                BUN, CREATININE, CA)                                 

 

                BASIC METABOLIC PANEL 2022 22:03:00 Tobias Hernandez Univer

[facility] [place]



                (NA, K, CL, CO2, GLUCOSE,                                 Medica

l Branch



                BUN, CREATININE, CA)                                 

 

                POCT GLUCOSE (AUTOMATED) 2022 21:50:00 Tobias Hernandez Bella

[facility]

 

                POCT GLUCOSE (AUTOMATED) 2022 21:50:00 Tobias Hernandez Bella

[facility]

 

                POCT GLUCOSE (AUTOMATED) 2022 20:39:00 Tobias Hernandez Bella

[facility]

 

                POCT GLUCOSE (AUTOMATED) 2022 20:39:00 Tobias Hernandez Bella

[facility]

 

                POCT GLUCOSE (AUTOMATED) 2022 18:58:00 Tobias Hernandez Bella

[facility]

 

                POCT GLUCOSE (AUTOMATED) 2022 18:58:00 Tobias Hernandez Bella

[facility]

 

                BASIC METABOLIC PANEL 2022 17:46:00 Tobias Hernandez Univer

[facility] [place]



                (NA, K, CL, CO2, GLUCOSE,                                 Medica

l Branch



                BUN, CREATININE, CA)                                 

 

                BASIC METABOLIC PANEL 2022 17:46:00 Yong Tobias Univer

[facility] [place]



                (NA, K, CL, CO2, GLUCOSE,                                 Medica

l Branch



                BUN, CREATININE, CA)                                 

 

                POCT GLUCOSE (AUTOMATED) 2022 17:39:00 Tobias Hernandez Bella

[facility]

 

                POCT GLUCOSE (AUTOMATED) 2022 17:39:00 AlbTobias gupta Bella

[facility]

 

                POCT GLUCOSE (AUTOMATED) 2022 16:22:00 AlbTobias gupta Bella

[facility]

 

                POCT GLUCOSE (AUTOMATED) 2022 16:22:00 AlbTobias gupta Bella

[facility]

 

                POCT GLUCOSE (AUTOMATED) 2022 15:27:00 AlbustamiTobias Bella

[facility]

 

                POCT GLUCOSE (AUTOMATED) 2022 15:27:00 AlbdamianamiTobias Bella

[facility]

 

                POCT GLUCOSE (AUTOMATED) 2022 14:17:00 AlbTobias gupta Bella

[facility]

 

                POCT GLUCOSE (AUTOMATED) 2022 14:17:00 Yong Tobias [facility]

 

                CBC WITHOUT DIFF 2022 13:33:00 Albusttrupti [facility]

 

                CBC WITHOUT DIFF 2022 13:33:00 Yong [facility]

 

                POCT GLUCOSE (AUTOMATED) 2022 13:20:00 Albcandy Tobias [facility]

 

                POCT GLUCOSE (AUTOMATED) 2022 13:20:00 Tobias Hernandez [facility]

 

                POCT GLUCOSE (AUTOMATED) 2022 12:13:00 Albustami Tobias [facility]

 

                POCT GLUCOSE (AUTOMATED) 2022 12:13:00 Yong Tobias [facility]

 

                XR CHEST 1 VW   2022 11:46:06 Yong [facility]

 

                XR CHEST 1 VW   2022 11:46:06 Yong [facility]

 

                MAGNESIUM       2022 10:54:00 Yong [facility]

 

                MRSA / MSSA SCREEN BY 2022 10:54:00 Yogn [facility]

 

                BASIC METABOLIC PANEL 2022 10:54:00 [facility]



                (NA, K, CL, CO2, GLUCOSE,                                 Medica

l Branch



                BUN, CREATININE, CA)                                 

 

                OSMOLALITY, SERUM OR 2022 10:54:00 [facility]



                PLASMA                                          [facility]

 

                PHOSPHORUS      2022 10:54:00 [facility]

 

                BETA HYDROXY-BUTYRATE 2022 10:54:00 [facility]

 

                PHOSPHORUS      2022 10:54:00 [facility]

 

                MAGNESIUM       2022 10:54:00 [facility]

 

                OSMOLALITY, SERUM OR 2022 [facility]:54:00 [facility]

 

                BETA HYDROXY-BUTYRATE 2022 10:54:00 [facility]

 

                BASIC METABOLIC PANEL 2022 10:54:00 [facility]



                (NA, K, CL, CO2, GLUCOSE,                                 Medica

l Branch



                BUN, CREATININE, CA)                                 

 

                MRSA / MSSA SCREEN BY 2022 10:54:00 [facility]

 

                POCT GLUCOSE (AUTOMATED) 2022 10:53:00 Tobias Hernandez Uni

[facility]

 

                POCT GLUCOSE (AUTOMATED) 2022 10:53:00 AlbTobias gupta Uni

[facility]

 

                POCT GLUCOSE (AUTOMATED) 2022 08:46:00 Radha Fofana

[facility]

 

                POCT GLUCOSE (AUTOMATED) 2022 08:46:00 Radha Fofana

[facility]

 

                POCT GLUCOSE (AUTOMATED) 2022 07:39:00 Radha Fofana

[facility]

 

                POCT GLUCOSE (AUTOMATED) 2022 07:39:00 Radha Fofana

[facility]

 

                POCT GLUCOSE (AUTOMATED) 2022 07:02:00 Radha Fofana [facility]

 

                POCT GLUCOSE (AUTOMATED) 2022 07:02:00 Radha Fofana [facility]

 

                POCT GLUCOSE (AUTOMATED) 2022 06:17:00 Radha Fofana [facility]

 

                POCT GLUCOSE (AUTOMATED) 2022 06:17:00 Radha Fofana [facility]

 

                AC PANEL 21 + LACTIC ACID 2022 05:36:00 Radha [facility]ana [facility]

[facility]

 

                AC PANEL 21 + LACTIC ACID 2022 05:36:00 Radha Fofana [facility]

[facility]

 

                POCT GLUCOSE (AUTOMATED) 2022 04:58:00 Radha Fofana [facility]

 

                POCT GLUCOSE (AUTOMATED) 2022 04:58:00 Radha Fofana

[facility]

 

                CT ABDOMEN PELVIS W 2022 04:33:00 Radha Fofana [facility]



                CONTRAST                                        [facility]

 

                CT ABDOMEN PELVIS W 2022 04:33:00 Radha Fofana [facility]



                CONTRAST                                        [facility]

 

                COMP. METABOLIC PANEL 2022 04:20:00 Radha Fofana [facility]



                ([id number])                                         [facility]

 

                COMP. METABOLIC PANEL 2022 04:20:00 Radha Fofana [facility]



                ([id number])                                         [facility]

 

                CBC WITH DIFF   2022 03:14:00 Radha Fofana [facility]

 

                BLOOD CULTURE SCREEN 2022 03:14:00 [facility]

 

                BLOOD CULTURE SCREEN 2022 03:14:00 Radha [facility]

 

                CBC WITH DIFF   2022 03:14:00 Radha Fofana [facility]

 

                ACTIVATED PARTIAL 2022 03:13:00 Radha Fofana [facility]



                [facility]

 

                PROTHROMBIN TIME / INR 2022 03:13:00 Radha Fofana [facility]

[facility]

 

                LIPASE          2022 03:13:00 Radha Fofana [facility]

 

                TROPONIN I      2022 03:13:00 Radha Fofana [facility]

 

                N-TERMINAL PRO-BNP 2022 03:13:00 Radha Fofana [facility]

 

                LIPASE          2022 03:13:00 Radha Fofana [facility]

 

                TROPONIN I      2022 03:13:00 Radha Fofana [facility]

 

                PROTHROMBIN TIME / INR 2022 03:13:00 Radha Fofana [facility]

 

                ACTIVATED PARTIAL 2022 03:13:00 Radha Fofana [facility]



                [facility]

 

                N-TERMINAL PRO-BNP 2022 03:13:00 Radha Fofana [facility]

 

                LACTIC ACID WHOLE BLOOD 2022 03:12:00 Radha Fofana [facility]

 

                LACTIC ACID WHOLE BLOOD 2022 03:12:00 Brigido [facility]

 

                EKG-12 LEAD     2022 01:55:16 Doctor Unassigned, No Univer

sity of [facility]

 

                EKG-12 LEAD     2022 01:55:16 Doctor Unassigned, No Univer

sity of [facility]

 

                EMERGENCY DEPARTMENT 2022 05:01:00 Doctor Unassigned, No [facility]

[facility]

 

                HOSPITAL ADMISSION 2022 05:01:00 Doctor Unassigned, No Uni

versity of [facility]

 

                BASIC METABOLIC PANEL 2022 09:31:00 Moose Mosquera [facility]



                (NA, K, CL, CO2, GLUCOSE,                                 Medica

l Branch



                BUN, CREATININE, CA)                                 

 

                CBC WITH DIFF   2022 09:31:00 Moose Mosquera [facility]

 

                BASIC METABOLIC PANEL 2022 09:31:00 Moose Mosquera [facility]



                (NA, K, CL, CO2, GLUCOSE,                                 Medica

l Branch



                BUN, CREATININE, CA)                                 

 

                CBC WITH DIFF   2022 09:31:00 [place]urtleff[facility]

 

                BASIC METABOLIC PANEL 2022 08:57:00 [facility]



                (NA, K, CL, CO2, GLUCOSE,                                 Medica

l Branch



                BUN, CREATININE, CA)                                 

 

                CBC WITH DIFF   2022 08:57:00 [facility]

 

                BASIC METABOLIC PANEL 2022 08:57:00 Moose Mosquera [facility]



                (NA, K, CL, CO2, GLUCOSE,                                 Medica

l Branch



                BUN, CREATININE, CA)                                 

 

                CBC WITH DIFF   2022 08:57:00 Eveline [facility]

 

                CBC WITH DIFF   2022 10:55:00 Lea reymundo  [facility]

 

                COMP. METABOLIC PANEL 2022 10:55:00 Lea reymundo  [facility]



                ([id number])                                         [facility]

 

                N-TERMINAL PRO-BNP 2022 10:55:00 Lea reymundo  [facility]

 

                COMP. METABOLIC PANEL 2022 10:55:00 Lea [facility]



                ([id number])                                         [facility]

 

                CBC WITH DIFF   2022 10:55:00 Lea [facility]

 

                N-TERMINAL PRO-BNP 2022 10:55:00 [facility]

 

                CBC WITH DIFF   2022 11:33:00 Ashly [facility]

 

                COMP. METABOLIC PANEL 2022 11:33:00 Lea reymundo  [facility]



                ([id number])                                         [facility]

 

                N-TERMINAL PRO-BNP 2022 11:33:00 Lea reymundo  [facility]

 

                MAGNESIUM       2022 11:33:00 Rand Dong  [facility]

 

                MAGNESIUM       2022 11:33:00 Lea reymundo  [facility]

 

                COMP. METABOLIC PANEL 2022 11:33:00 Lea reymundo  [facility]



                ([id number])                                         Medical Branch

 

                CBC WITH DIFF   2022 11:33:00 Ashly [facility]

 

                N-TERMINAL PRO-BNP 2022 11:33:00 Rand Dong  [facility]

 

                ASPIRATE OR ABSCESS 2022 21:31:00 Marvin Cernaa   [facility] [place]



                CULTURE(AEROBIC/ANAEROBIC                                 Medica

l Branch



                )                                               

 

                ASPIRATE OR ABSCESS 2022 21:31:00 Marvin Cernaa   [facility] [place]



                CULTURE(AEROBIC/ANAEROBIC                                 Medica

l Branch



                )                                               

 

                PROTEIN CREAT RATIO URINE 2022 11:11:00 Rand Dong

[facility]

 

                SODIUM, URINE RANDOM 2022 11:11:00 Rand Dong  [facility]

 

                SODIUM, URINE RANDOM 2022 11:11:00 Rand Dong  [facility]

 

                PROTEIN CREAT RATIO URINE 2022 11:11:00 Rand Dong  [facility]

 

                XR CHEST 1 VW   2022 11:07:43 Rand Dong  [facility]

 

                XR FOOT 3+ VW LEFT 2022 11:07:43 Rand Dong  [facility]

 

                XR CHEST 1 VW   2022 11:07:43 Rand Dong  [facility]

 

                XR FOOT 3+ VW LEFT 2022 11:07:43 Rand Dong  [facility]

 

                URINE DRUG (IMMUNOASSAY) 2022 11:07:00 Rand Dong  [facility]



                SCREEN                                          

 

                URINE DRUG (LCMSMS) - 2022 11:07:00 Rand Dong  [facility]



                OPIATES PANEL                                   [facility]

 

                URINE DRUG (IMMUNOASSAY) 2022 11:07:00 Rand Dong  [facility]



                SCREEN                                          

 

                URINE DRUG (LCMSMS) - 2022 11:07:00 Rand Dong  [facility]



                SYNTHETIC OPIATES PANEL                                 Medical 

Branch

 

                SEDIMENTATION RATE 2022 11:03:00 [facility]

 

                PROCALCITONIN   2022 11:03:00 Lea [facility]

 

                CBC WITH DIFF   2022 11:03:00 Lea [facility]

 

                COMP. METABOLIC PANEL 2022 11:03:00 Lea reymundo  [facility]



                ([id number])                                         [facility]

 

                N-TERMINAL PRO-BNP 2022 11:03:00 [facility]

 

                MAGNESIUM       2022 11:03:00 Lea [facility]

 

                PHOSPHORUS      2022 11:03:00 Lea [facility]

 

                CREATINE KINASE 2022 11:03:00 Lea [facility]

 

                VITAMIN D, 25-OH 2022 11:03:00 Lea [facility]

 

                VITAMIN B12, LEVEL 2022 11:03:00 [facility]

 

                GLYCOSYLATED HEMOGLOBIN 2022 11:03:00 Lea reymundo  [facility] [place]



                (A1C)                                           [facility]

 

                PHOSPHORUS      2022 11:03:00 Lea [facility]

 

                CREATINE KINASE 2022 11:03:00 Lea [facility]

 

                MAGNESIUM       2022 11:03:00 Lea [facility]

 

                VITAMIN B12, LEVEL 2022 11:03:00 Lea reymundo  [facility]

 

                COMP. METABOLIC PANEL 2022 11:03:00 Lea reymundo  [facility]



                ([id number])                                         [facility]

 

                SEDIMENTATION RATE 2022 11:03:00 [facility]

 

                CBC WITH DIFF   2022 11:03:00 Lea [facility]

 

                GLYCOSYLATED HEMOGLOBIN 2022 11:03:00 Rand Dong  [facility]

[facility] [place]



                (A1C)                                           [facility]

 

                N-TERMINAL PRO-BNP 2022 11:03:00 Lea reymundo  [facility]

 

                VITAMIN D, 25-OH 2022 11:03:00 Lea [facility]

 

                PROCALCITONIN   2022 11:03:00 Lea reymundo  [facility]

 

                CT ANGIOGRAM LOWER 2022 03:39:00 Radha Fofana [facility]



                EXTREMITY LEFT W CONTRAST                                 Medica

l Branch

 

                CT ANGIOGRAM LOWER 2022 03:39:00 Radha Fofana [facility]



                EXTREMITY LEFT W CONTRAST                                 Medica

l Branch

 

                CT HEAD WO CONTRAST 2022 03:25:00 Radha [facility]

 

                CT HEAD WO CONTRAST 2022 03:25:00 Radha [facility]

 

                URINALYSIS      2022 01:22:00 Radha Fofana [facility]

 

                URINE CULTURE   2022 01:22:00 Brigido Radha [facility]

 

                URINALYSIS      2022 01:22:00 Brigido Radha [facility]

 

                URINE CULTURE   2022 01:22:00 Radha Fofana [facility]

 

                CBC WITH DIFF   2022 00:58:00 Radha Fofana [facility]

 

                ACTIVATED PARTIAL 2022 00:58:00 Radha Fofana [facility]



                [facility]

 

                PROTHROMBIN TIME / INR 2022 00:58:00 Radha Fofana [facility]

 

                COMP. METABOLIC PANEL 2022 00:58:00 Radha Fofana [facility]



                ([id number])                                         [facility]

 

                BLOOD CULTURE SCREEN 2022 00:58:00 Radha [facility]

 

                LACTIC ACID WHOLE BLOOD 2022 00:58:00 Radha Fofana [facility]

 

                N-TERMINAL PRO-BNP 2022 00:58:00 Rand Dong  [facility]

 

                MAGNESIUM       2022 00:58:00 Lea reymundo  [facility]

 

                PHOSPHORUS      2022 00:58:00 Lea [facility]

 

                FERRITIN SERUM  2022 00:58:00 Lea [facility]

 

                IRON PANEL      2022 00:58:00 Lea [facility]

 

                THYROID STIMULATING 2022 00:58:00 Lea [facility]

 

                LIPID PANEL ([id number])(TOTAL 2022 00:58:00 Rand Dong

[facility]



                CHOLESTEROL,                                    [facility]



                TRIGLYCERIDES, HDL)                                 

 

                BLOOD CULTURE SCREEN 2022 00:58:00 Radha Fofana [facility]

 

                PHOSPHORUS      2022 00:58:00 Lea reymundo  [facility]

 

                MAGNESIUM       2022 00:58:00 Lea [facility]

 

                FERRITIN SERUM  2022 00:58:00 Lea [facility]

 

                THYROID STIMULATING 2022 00:58:00 Lea [facility]

 

                COMP. METABOLIC PANEL 2022 00:58:00 Radha Fofana [facility]



                ([id number])                                         Medical Branch

 

                LIPID PANEL ([id number])(TOTAL 2022 00:58:00 Rand Dong  [facility]



                CHOLESTEROL,                                    [facility]



                TRIGLYCERIDES, HDL)                                 

 

                IRON PANEL      2022 00:58:00 Lea reymundo  [facility]

 

                CBC WITH DIFF   2022 00:58:00 Radha Fofana [facility]

 

                PROTHROMBIN TIME / INR 2022 00:58:00 Radha Fofana [facility]

 

                ACTIVATED PARTIAL 2022 00:58:00 Radha Fofana [facility]



                [facility]

 

                N-TERMINAL PRO-BNP 2022 00:58:00 Lea reymundo  [facility]

 

                LACTIC ACID WHOLE BLOOD 2022 00:58:00 [facility]

 

                EMER[facility]ENCY DEPARTMENT 2022-05-10 05:01:00 Doctor Unassigned, No U

niversity of 

[facility] 2022-05-10 05:01:00 Doctor Unassigned, No Un

iversity of 

[facility]

 

                HOSPITAL ADMISSION 2022-05-10 05:01:00 Doctor Unassigned, No Uni

versity of [facility]

 

                EMERGENCY DEPARTMENT 2022-05-10 05:01:00 Doctor Unassigned, No U

niversity of 

[facility] 2022-05-10 05:01:00 Doctor Unassigned, No Un

iversity [facility]

 

                Spinal puncture, lumbar, 2022 20:30:00                 Patience Garcia



                diagnostic; with                                 



                fluoroscopic or CT                                 



                guidance                                        

 

                MAGNESIUM       2022 04:12:00 Singer, [facility]

 

                COMP. METABOLIC PANEL 2022 04:12:00 Singer, Varinder [facility]

[facility] [place]



                ([id number])                                         [facility]

 

                CBC WITH DIFF   2022 04:12:00 Singer, [facility]

 

                CT HEAD WO CONTRAST 2022 00:15:29 ALLISON Romeo [facility]

 

                URINALYSIS      2022 23:53:00 ALLISON Romeo Elena [facility]

 

                COMP. METABOLIC PANEL 2022 23:52:00 ALLISON Romeo Elena [facility]

[facility] [place]



                ([id number])                                         [facility]

 

                CBC WITH DIFF   2022 23:52:00 ALLISON Romeo Elena [facility]

 

                XR CHEST 1 VW   2022 03:03:32 Rand Dong  [facility]

 

                COMP. METABOLIC PANEL 2022 11:57:00 Niurka Cielo   [facility]



                ([id number])                                         [facility]

 

                CBC WITH DIFF   2022 11:57:00 Cielo Thomas   [facility]

 

                BASIC METABOLIC PANEL 2022 12:10:00 [facility]



                (NA, K, CL, CO2, GLUCOSE,                                 Medica

l Branch



                BUN, CREATININE, CA)                                 

 

                CBC WITH DIFF   2022 [facility]

 

                URINALYSIS      2022 00:40:00 Suly Luna [facility]

 

                URINE CULTURE   2022 00:40:00 Suly Luna [facility]

 

                FECES CULTURE   2022 14:58:00 [facility]

 

                OCCULT (GUAIAC) BLOOD 2022 [facility]:58[facility]00 [facility]

 

                CLOSTRIDIUM DIFFICILE 2022 14:58:00 [facility]

 

                FECAL PATHOGENS BY PCR 2022 14:58:00 [facility]

 

                URINE DRUG (IMMUNOASSAY) 2022 10:11:00 Marcelle [facility] DRUG                                 [facility]



                SCREEN                                          

 

                COVID-19 (ID NOW RAPID 2022 07:33:00 Silvino Garay [facility]



                TESTING)                                        [facility]

 

                LAB ONLY COVID  2022 07:33:00 Silvino Garay [facility]



                INTERPRETATION                                  [facility]

 

                COMP. METABOLIC PANEL 2022 06:18:00 Silvino Garay [facility]



                ([id number])                                         [facility]

 

                CBC WITH DIFF   2022 05:40:00 Silvino Garay [facility]

 

                URINALYSIS      2022 01:43:00 Alma Villaseñor [facility]

 

                LIPASE          2022 01:40:00 Alma Villaseñor [facility]

 

                COMP. METABOLIC PANEL 2022 01:40:00 Alma Villaseñor [facility]



                ([id number])                                         [facility]

 

                CBC WITH DIFF   2022 01:38:00 Alma Villaseñor [facility]

 

                XR CHEST 1 VW   2022 23:40:19 Alma Villaseñor [facility]

 

                ASSIGNMENT OF BENEFITS 2022 22:05:40 Doctor Unassigned, No

 [facility]

 

                NOTICE OF PRIVACY 2022 22:04:58 Doctor Unassigned, No [facility]



                PRACTICES                       [facility]

 

                CONSENT/REFUSAL FOR 2022 22:04:33 Doctor Unassigned, No [facility]



                DIAGNOSIS AND TREATMENT                 Name            [facility]

 

                URINALYSIS      2022-01-15 23:09:00 Neeta Maria [facility]

 

                BLOOD CULTURE SCREEN 2022-01-15 23:04:00 Neeta Maria [facility]

 

                D-DIMER         2022-01-15 22:49:00 Neeta Maria [facility]

 

                COVID-19 (ID NOW RAPID 2022-01-15 22:48:00 Neeta Maria [facility] [place]



                TESTING)                                        [facility]

 

                COMP. METABOLIC PANEL 2022-01-15 22:17:00 Neeta Maria [facility]

[facility] [place]



                ([id number])                                         [facility]

 

                CBC WITH DIFF   2022-01-15 22:17:00 Neeta Maria [facility]

 

                XR CHEST 1 VW   2022-01-15 21:47:19 Neeta Maria [facility]

 

                COMP. METABOLIC PANEL 2021 22:20:00 Varinder Sol [facility]

[facility] [place]



                ([id number])                                         [facility]

 

                CBC WITH DIFF   2021 22:20:00 Varinder Sol o

[facility]

 

                CT ABDOMEN PELVIS WO 2021-05-10 00:39:40 Bossman Villa [facility]

[facility] [place]



                CONTRAST                                        [facility]

 

                LIPASE          2021-05-10 00:06:00 Bossman Villa [facility]

 

                COMP. METABOLIC PANEL 2021-05-10 00:06:00 Bossman Villa [facility]



                ([id number])                                         [facility]

 

                CBC WITH DIFF   2021-05-10 00:06:00 Bossman [facility]

 

                URINALYSIS      2021-05-10 00:00:00 Bossman Villa [facility] [place]



                                                                Medical [facility]

 

                COVID-19 (ID NOW RAPID 2021-05-10 00:00:00 Bossman Villa U

[facility]



                TESTING)                                        Medical Branch

 

                Cholecystectomy                                 [facility]

 

                Shunt of cerebral                                 [facility]

nn



                ventricle to extracranial                                 



                site                                            







Plan of Care







             Planned Activity Planned Date Details      Comments     Source

 

             Future Scheduled 2023-01-10   Lipid panel               [facility]

s



             Test         00:00:00     (procedure) [code =              Medical 

Center



                                       63316379]                 

 

             Future Scheduled 2023-01-10   Lipid panel               [facility]

s



             Test         00:00:00     (procedure) [code =              Medical 

Center



                                       [id number]]                 

 

             Future Scheduled 2023-01-10   Lipid panel               [facility]

s



             Test         00:00:00     (procedure) [code =              [facility]



                                       [id number]]                 

 

             Future Scheduled 2023-01-10   Lipid panel               [facility]

s



             Test         00:00:00     (procedure) [code =              [facility] 

Center



                                       [id number]]                 

 

             Future Scheduled 2022   INFLUENZA VACCINE (#1)              C

[facility]



             Test         00:00:00     [code = INFLUENZA              Medical Ce

nter



                                       VACCINE (#1)]              

 

             Future Scheduled 2022   INFLUENZA VACCINE (#1)              C

[facility]



             Test         00:00:00     [code = INFLUENZA              Medical Ce

nter



                                       VACCINE (#1)]              

 

             Future Scheduled 2022   INFLUENZA VACCINE (#1)              C

[facility]



             Test         00:00:00     [code = INFLUENZA              Medical Ce

nter



                                       VACCINE (#1)]              

 

             Future Scheduled 2022   DEPRESSION SCREENING              [facility]



             Test         00:00:00     (12+) [code =              Medical Center



                                       DEPRESSION SCREENING              



                                       (12+)]                    

 

             Future Scheduled 2022   DEPRESSION SCREENING              [facility]



             Test         00:00:00     (12+) [code =              Medical Center



                                       DEPRESSION SCREENING              



                                       (12+)]                    

 

             Future Scheduled 2022   DEPRESSION SCREENING              [facility]



             Test         00:00:00     (12+) [code =              Medical Center



                                       DEPRESSION SCREENING              



                                       (12+)]                    

 

             Future Scheduled 2021   INFLUENZA VACCINE              [facility]



             Test         00:00:00     (Season Ended) [code =              [facility]



                                       INFLUENZA VACCINE              



                                       (Season Ended)]              

 

             Future Scheduled 2021   DEPRESSION SCREENING              [facility]



             Test         00:00:00     (12+) [code =              Medical Center



                                       DEPRESSION SCREENING              



                                       (12+)]                    

 

             Future Scheduled 2020-04-10   Hemoglobin A1c              [facility]

kes



             Test         00:00:00     measurement               Medical Center



                                       (procedure) [code =              



                                       [id number]]                 

 

             Future Scheduled 2020-04-10   Hemoglobin A1c              [facility]

kes



             Test         00:00:00     measurement               Medical Center



                                       (procedure) [code =              



                                       [id number]]                 

 

             Future Scheduled 2020-04-10   Hemoglobin A1c              [facility]

kes



             Test         00:00:00     measurement               Medical Center



                                       (procedure) [code =              



                                       [id number]]                 

 

             Future Scheduled 2020-04-10   Hemoglobin A1c              [facility]

kes



             Test         00:00:00     measurement               Medical Center



                                       (procedure) [code =              



                                       [id number]]                 

 

             Future Scheduled 2004   DTAP/TDAP/TD VACCINES              [facility]



             Test         00:00:00     (1 - Tdap) [code =              Medical C

enter



                                       DTAP/TDAP/TD VACCINES              



                                       (1 - Tdap)]               

 

             Future Scheduled 2004   DTAP/TDAP/TD VACCINES              [facility]



             Test         00:00:00     (1 - Tdap) [code =              Medical C

enter



                                       DTAP/TDAP/TD VACCINES              



                                       (1 - Tdap)]               

 

             Future Scheduled 2004   DTAP/TDAP/TD VACCINES              [facility]



             Test         00:00:00     (1 - Tdap) [code =              Medical C

enter



                                       DTAP/TDAP/TD VACCINES              



                                       (1 - Tdap)]               

 

             Future Scheduled 2004   DTAP/TDAP/TD VACCINES              [facility]



             Test         00:00:00     (1 - Tdap) [code =              Medical C

enter



                                       DTAP/TDAP/TD VACCINES              



                                       (1 - Tdap)]               

 

             Future Scheduled 2003   HEPATITIS C SCREENING              [facility]

[facility]



             Test         00:00:00     [code = HEPATITIS C              Medical 

Center



                                       SCREENING]                

 

             Future Scheduled 2003   HEPATITIS C SCREENING              [facility]



             Test         00:00:00     [code = HEPATITIS C              Medical 

Center



                                       SCREENING]                

 

             Future Scheduled 2003   HEPATITIS C SCREENING              [facility]

[facility]



             Test         00:00:00     [code = HEPATITIS C              Medical 

Center



                                       SCREENING]                

 

             Future Scheduled 2003   HEPATITIS C SCREENING              [facility]

[facility]



             Test         00:00:00     [code = HEPATITIS C              Medical 

Center



                                       SCREENING]                

 

             Future Scheduled 1997   COVID-19 VACCINE (1)              [facility]



             Test         00:00:00     [code = COVID-19              Medical Abilio

ter



                                       VACCINE (1)]              

 

             Future Scheduled 1997   Tobacco Cessation              [facility]



             Test         00:00:00     Counseling and              Medical Cente

r



                                       Screening (12+) [code              



                                       = Tobacco Cessation              



                                       Counseling and              



                                       Screening (12+)]              

 

             Future Scheduled 1997   Tobacco Cessation              [facility]



             Test         00:00:00     Counseling and              Medical Cente

r



                                       Screening (12+) [code              



                                       = Tobacco Cessation              



                                       Counseling and              



                                       Screening (12+)]              

 

             Future Scheduled 1995   DIABETIC EYE EXAM              [facility]



             [facility]         00:00:00     [code = DIABETIC EYE              Medical

 Center



                                       EXAM]                     

 

             Future Scheduled 1995   Urine screening for              [facility]



             Test         00:00:00     protein (procedure)              Medical 

Center



                                       [code = 322797176]              

 

             Future Scheduled 1995   DIABETIC EYE EXAM              [facility]



             Test         00:00:00     [code = DIABETIC EYE              Medical

 Center



                                       EXAM]                     

 

             Future Scheduled 1995   Urine screening for              [facility]



             Test         00:00:00     protein (procedure)              Medical 

Center



                                       [code = 247810460]              

 

             Future Scheduled 1995   DIABETIC EYE EXAM              [facility]



             Test         00:00:00     [code = DIABETIC EYE              Medical

 Center



                                       EXAM]                     

 

             Future Scheduled 1995   Urine screening for              [facility]



             Test         00:00:00     protein (procedure)              Medical 

Center



                                       [code = 304750842]              

 

             Future Scheduled 1995   DIABETIC EYE EXAM              [facility]



             Test         00:00:00     [code = DIABETIC EYE              Medical

 Center



                                       EXAM]                     

 

             Future Scheduled 1995   Urine screening for              [facility]



             Test         00:00:00     protein (procedure)              Medical 

Center



                                       [code = 082355391]              

 

             Future Scheduled 1991   PNEUMOCOCCAL VACCINE              [facility]



             Test         00:00:00     0-64 YRS (1 of 1 -              Medical C

enter



                                       PPSV23) [code =              



                                       PNEUMOCOCCAL VACCINE              



                                       0-64 YRS (1 of 1 -              



                                       PPSV23)]                  

 

             Future Scheduled 1991   PNEUMOCOCCAL VACCINE              [facility]



             Test         00:00:00     0-64 YRS (1 - PCV)              Medical C

enter



                                       [code = PNEUMOCOCCAL              



                                       VACCINE 0-64 YRS (1 -              



                                       PCV)]                     

 

             Future Scheduled 1991   PNEUMOCOCCAL VACCINE              [facility]



             Test         00:00:00     0-64 YRS (1 - PCV)              Medical C

enter



                                       [code = PNEUMOCOCCAL              



                                       VACCINE 0-64 YRS (1 -              



                                       PCV)]                     

 

             Future Scheduled 1991   PNEUMOCOCCAL VACCINE              [facility]



             Test         00:00:00     0-64 YRS (1 - PCV)              Medical C

enter



                                       [code = PNEUMOCOCCAL              



                                       VACCINE 0-64 YRS (1 -              



                                       PCV)]                     

 

             Future Scheduled 1986   COVID-19 VACCINE (#1)              [facility]



             [facility]         00:00:00     [code = COVID-19              Medical Abilio

ter



                                       VACCINE (#1)]              

 

             Future Scheduled 1986   COVID-19 VACCINE (#1)              [facility]



             Test         00:00:00     [code = COVID-19              Medical Abilio

ter



                                       VACCINE (#1)]              

 

             Future Scheduled 1986   COVID-19 VACCINE (#1)              [facility]



             Test         00:00:00     [code = COVID-19              Medical Abilio

ter



                                       VACCINE (#1)]              







Encounters







        Start   End     Encounter Admission Attending Care    Care    Encounter 

Source



        Date/Time Date/Time Type    Type    Clinicians Facility Department ID   

   

 

        2023         Outpatient                 [facility]     -[id number]

 [place]



        15:02:41                                                 0227    [facility]

 

        2023         Outpatient         Jesusita,  STLMLC  STLC  [id number]

 Common



        15:24:00                         Domingo                  [id number]   [facility]

 

        2022         Outpatient                 [facility]     -[id number]

 [place]



        10:51:49                                                 1212    [facility]

 

        2022         Outpatient                 [facility]     -[id number]

 [place]



        08:22:27                                                 0411    [facility]

 

        2022         Outpatient                 [facility]     -[id number]

 [place]



        11:10:01                                                 0328    [facility]

 

        2022         Outpatient         Jesusita,  STLMLC  STLMLC  [id number]

 Common



        13:45:16                         Domingo                  [id number]   [facility]

 

        2022         Outpatient         Jesusita,  STLMLC  STLMLC  [id number]

 Common



        13:17:39                         Domingo                  [id number]   [facility]

 

        2022         Outpatient         Jesusita,  STLMLC  STLMLC  [id number]

 Common



        13:16:34                         Domingo                  [id number]   [facility]

 

        2023 Outpatient R               [facility]    [id number]

019 Univers



        14:00:00 14:00:00                                                 [facility]

 

        2023 Outpatient R               [facility]    [id number]

[id number] Univers



        14:00:00 14:00:00                                                 ity [facility]

 

        2023-05-10 2023-05-10 [facility]       [facility]    [id number]

[id number] Univers



        13:30:00 13:30:00                 SUREKHA                           ity [facility]

 

        2023 Emergency X       [facility]    ERT     [id number]

[id number] Univers



        14:23:00 19:32:00                 , KATIA                         ity [facility]

 

        2023 Emergency         [facility]    1.2.840.114 

[id number] Univers



        14:23:00 19:32:00                 , Katia [place] 350.1.13.10         i

ty [facility] 4.2.7.2.686         [place]

[facility]  [phone]         [address]

 

        2023 Outpatient R               [facility]    [id number]

072 Univers



        13:00:00 13:00:00                                                 ity [facility]

 

        2023 Telephone         Mary Anne Ramirez [facility]    [id number]

[id number] Univers



        00:00:00 00:00:00                         HEALTH  350.1.13.10         it

y of



                                                [place] 4.2.7.2.686         Eric

as



                                                HÉCTOR?BLEA [phone]         [address]



                                                MEDICAL                 



                                                OFFICE                  



                                                [facility]                 

 

        2023 [facility] R       [facility]    [id number]

3200 Univers



        14:00:00 14:00:00                 SUREKHA                           [facility]

 

        2023 Telephone         Mary Anne Ramirez [facility]    [id number]

[id number] Univers



        00:00:00 00:00:00                         HEALTH  350.1.13.10         it

y of



                                                [place] 4.2.7.2.686         Eric

as



                                                HÉCTOR?BLEA [phone]         [address]



                                                MEDICAL                 



                                                OFFICE                  



                                                [facility]                 

 

        2023 Telephone         Alan  [facility]    [id number] [id number]

[id number] Univers



        00:00:00 00:00:00                 Anette A HEALTH  350.1.13.10         

ity of



                                                [place] 4.2.7.2.686         Eric

as



                                                HÉCTOR?BLEA [phone]         Me

stella HERNANDEZ    220             [place]



                                                MEDICAL                 



                                                OFFICE                  



                                                [facility]                 

 

        2023 Outpatient R       ASHLEYMARY ANNE [facility]    [id number]

160 Univers



        16:30:00 16:30:00                 MARY ANNE RAMIREZ                         [facility]

 

        2023 Telephone         Ashley [facility]    1.2.100.664 [id number]

[id number] Univers



        00:00:00 00:00:00                         HEALTH  350.1.13.10         it

y of



                                                [place] 4.2.7.2.686         Eric

as



                                                HÉCTOR?BLEA [phone]         Me

stella RUIZ    044             [facility]                 



                                                OFFICE                  



                                                [facility]                 

 

        2023 Telephone         Ashley [facility]    ..[id number]

[id number] Univers



        00:00:00 00:00:00                         HEALTH  350.1.13.10         it

y of



                                                [place] 4.2.7.2.686         Eric

as



                                                HÉCTOR?BLEA [phone]         [facility]    220             [facility]                 



                                                OFFICE                  



                                                [facility]                 

 

        2023 [facility]       [facility]     [id number]

[id number] Univers



        14:34:00 20:52:00                 RADHA                         [facility]

 

        2023 Emergency         [facility]    1.2.894.882 [id number]

[id number] Univers



        14:34:00 20:52:00                 Radhasalvador [facility] 350.1.13.10         i

ty of



                                                [place] 4.2.7.2.686         [place]

[place]



                                                [place]  [phone]         Medi

sarah



                                                        084             [place]

 

        2023 Outpatient R       ASHLEY [facility]    [id number]

308 Univers



        13:00:00 14:32:40                 MARY ANNE RAMIREZ                         [facility]

 

        2023 Office          [facility]    1..840.114 [id number]

46 Univers



        13:00:00 14:32:40 Visit                   HEALTH  350.1.13.10         it

y of



                                                [place] 4.2.7.2.686         Eric

as



                                                HÉCTOR?BLEA [phone]         [facility]    220             [facility]                 



                                                OFFICE                  



                                                [facility]                 

 

        2023 Telephone         Ashley, Hirsch [facility]    1.2.014.402 [id number]

[id number] Univers



        00:00:00 00:00:00                         HEALTH  350.1.13.10         it

y of



                                                [place] 4.2.7.2.686         Eric

as



                                                HÉCTOR?BLEA [phone]         [facility]    220             [facility]                 



                                                OFFICE                  



                                                [facility]                 

 

        2023 Telephone         [facility]    1.2.840.114 [id number]

[id number] Univers



        00:00:00 00:00:00                 Surekha   [facility] 350.1.13.10         i

ty of



                                                [place] 4.2.7.2.686         [place]

[place]



                                                PROFESSIO [phone]         Me

stella



                                                [facility]     204             [facility]                 

 

        2023 Outpatient R       [facility]    [id number]

2965 Univers



        14:00:00 14:45:52                 SUREKHA                           ity [facility]

 

        2023 Telephone         [facility]    1.2.840.114 [id number]

[id number] Univers



        00:00:00 00:00:00                 Surekha   [place] 350.1.13.10         i

ty of



                                                [place] 4.2.7.2.686         [place]

[place]



                                                PROFESSIO [phone]         [facility]     188             [facility]                 

 

        2023 Emergency X       [facility]    ERT     [id number]

[id number] Univers



        16:59:00 23:22:00                 NEETA                          ity of



                                                                        [facility]

 

        2023 Emergency         [facility]    1.2.841.225 [id number]

[id number] Univers



        16:59:00 23:22:00                 Neetadenver MCCARTHY 350.1.13.10         

ity of



                                                [place] 4.2.7.2.686         [place]

[place]



                                                CAMPUS  [phone]         [address]

 

        2023 Outpatient R       [facility]    [id number]

2020 Univers



        13:00:00 13:49:32                 SUREKHA                           ity [facility]

 

        2023 Nurse           Nurse, [facility]    1.2.

840.114 [id number] 

Univers



        13:00:00 13:49:32 Visit           Jimena Surekhaadrián MCCARTHY 350.1.13.10   

      ity of



                                                [facility] 4.2.7.2.686         [place]

[place]



                                                PROFESSIO [phone]         Me

dical



                                                NAL     [address]             [place]



                                                BUILDING                 

 

        2023 Telephone         Mary Anne Ramirez [facility]    1.2.814.061 [id number]

[id number] Univers



        00:00:00 00:00:00                         HEALTH  350.1.13.10         it

y of



                                                [place] 4.2.7.2.686         Eric

as



                                                HÉCTOR?BLEA [phone]         Me

dical



                                                KNEY    220             [place]



                                                MEDICAL                 



                                                OFFICE                  



                                                BUILDING                 

 

        2023 Emergency X       hospitals    ERT     [id number]

[id number] Univers



        15:37:00 21:02:00                 FOLUSHO                         [facility]

 

        2023 [facility]         [facility]    1.2.840.114 [id number]

[id number] Univers



        15:37:00 21:02:00                 Bossman MCCARTHY 350.1.13.10        

 ity of



                                                [facility] 4.2.7.2.686         [place]

[place]



                                                [place]  [phone]         Medi

sarah



                                                        084             [place]

 

        2023 Outpatient R       MARY ANNE RAMIREZ [facility]    [id number]

992 Univers



        14:30:00 14:30:00                 MARY ANNE RAMIREZ                         ity [facility]

 

        2023 Outpatient R       JIMENA [facility]    [id number]

[id number] Univers



        13:30:00 13:30:00                 SUREKHA                           ity [facility]

 

        2023 Transition         IMTIAZ Guzman  1.2.840.114 [id number]

[id number] Univers



        00:00:00 00:00:00 of Care         Dulcetristan DUNN   350.1.13.10        

 ity of



                                                [place]   4.2.7.2.686         [place]

[place]



                                                        [phone]         [address]

 

        2023 Inpatient X       RUDDYDONALD [facility]  

   [id number] Univers



        18:47:00 14:22:00                 DONALD FOX                    

     ity of



                                                                        [facility]

 

        2023 Hospital         Yoni Mariscal [facility]    1.2.8

40.114 [id number] 

Univers



        18:47:00 14:22:00 Encounter         Alan Noe [facility]  350.1.13.10    

     ity of



                                        Donald Fox  4.2.7.2.686  

       [place]



                                                CITY    [phone]         [address]                 



                                                ([facility])                   

 

        2023 Patient         IMTIAZ Allan  1.2.840.114 [id number]

[id number] Univers



        00:00:00 00:00:00 Outreach         Marcela DUNN   350.1.13.10         i

ty of



                                                [place]   4.2.7.2.686         [place]

[place]



                                                        [phone]         [address]

 

        2023 Surgery         Derrek [facility]    1.2.840.114 [id number]

198 Univers



        13:29:00 15:37:00                 Vikash SPECIALTY 350.1.13.10         

ity of



                                                CARE    4.2.7.2.686         [place]

[place]



                                                CENTER .[id number]         Me

stella VILLARREAL 020             [facility]                   

 

        2023 Telephone         Mary Anne Ramirez [facility]    1.2.107.820 [id number]

[id number] Univers



        00:00:00 00:00:00                         HEALTH  350.1.13.10         it

y of



                                                [place] 4.2.7.2.686         Eric

as



                                                HÉCTOR?BLEA [phone]         Me

dicandrew HERNANDEZ    220             [place]



                                                MEDICAL                 



                                                OFFICE                  



                                                BUILDING                 

 

        2023 Patient         IMTIAZ Salcedo  1.2.840.114 [id number]

[id number] Univers



        00:00:00 00:00:00 Outreach         Alondra DUNN   350.1.13.10         

ity of



                                                [place]   4.2.7.2.686         [place]

[place]



                                                        [phone]         [address]

 

        2023-02-15 2023-02-15 Telephone         Mary Anne Ramirez [facility]    1.2.151.156 [id number]

[id number] Univers



        00:00:00 00:00:00                         HEALTH  350.1.13.10         it

y of



                                                [place] 4.2.7.2.686         Eric

as



                                                HÉCTOR?BLEA [phone]         [address]                 



                                                OFFICE                  



                                                [facility]                 

 

        2023 Patient         IMTIAZ Salcedo  1.2.840.114 [id number]

[id number] Univers



        00:00:00 00:00:00 Outreach         Alondra CARRILLO DUNN   350.1.13.10         

ity of



                                                [place]   4.2.7.2.686         [place]

[place]



                                                        [phone]         Medi

sarah



                                                        [address]             [place]

 

        2023 Refill          Noejuan [facility]    1.2.840.114 [id number]

[id number] Univers



        00:00:00 00:00:00                 Adria campa HEALTH  350.1.13.10       

  ity of



                                                [facility] 4.2.7.2.686         Eric

as



                                                HÉCTOR?BLEA [phone]         [address]                 

 

        2023 Emergency X       SINGER, [facility]    ERT     [id number]

48 Univers



        13:47:00 18:16:00                 VARINDER                         [facility]

 

        2023 Emergency         [facility]    1.2.175.671 [id number]

[id number] Univers



        13:47:00 18:16:00                 Varinder MCCARTHY 350.1.13.10         i

ty of



                                                JAY JAYANIBAL 4.2.7.2.686         [place]

[place]



                                                [place]  [phone]         Medi

sarah



                                                        084             [place]

 

        2023 Telephone         Mary Anne Ramirez [facility]    1.2.241.207 [id number]

[id number] Univers



        00:00:00 00:00:00                         HEALTH  350.1.13.10         it

y of



                                                [place] 4.2.7.2.686         Eric

as



                                                HÉCTOR?BLEA [phone]         [address]                 



                                                OFFICE                  



                                                [facility]                 

 

        2023-02-10 2023 Emergency X       DANIA, [facility]    ERT     [id number]

[id number] Univers



        16:13:00 00:01:00                 BOSSMAN                         ity [facility]

 

        2023-02-10 2023 Emergency         Dania, [facility]    1.2.840.114 [id number]

[id number] Univers



        16:13:00 00:01:00                 Bossman MCCARTHY 350.1.13.10        

 ity of



                                                IRINA 4.2.7.2.686         [facility]  [phone]         Medi

sarah



                                                        084             [place]

 

        2023-02-10 2023-02-10 Patient         Roya Sotelo  1.2.840.114 [id number]

[id number] Univers



        00:00:00 00:00:00 Outreach         E       DUNN   350.1.13.10         i

ty of



                                                [place]   4.2.7.2.686         [place]

[place]



                                                        [phone]         Medi

sarah



                                                        403             [place]

 

        2023-02-10 2023-02-10 Telephone         Ashley Mary Anne [facility]    1.2.475.699 [id number]

[id number] Univers



        00:00:00 00:00:00                         HEALTH  350.1.13.10         it

y of



                                                [facility] 4.2.7.2.686         Eric

as



                                                HÉCTOR?BLEA [phone]         [address]



                                                MEDICAL                 



                                                OFFICE                  



                                                BUILDING                 

 

        2023 [facility]       [facility]     [id number]

[id number] Univers



        15:06:00 19:05:00                 INOCENCIA                          itlissa [facility]

 

        2023 [facility]         [facility]    1.2.840.114 [id number]

[id number] Univers



        15:06:00 19:05:00                 Inocencia MCCARTHY 350.1.13.10         

ity of



                                                [facility] 4.2.7.2.686         [facility]  [phone]         Medi

sarah



                                                        084             [place]

 

        2023 Outpatient X       LEA [facility]    KR[place]     [id number]

[id number] Univers



        14:05:00 19:22:00                 RAND                           ity of



                                                                        [facility]

 

        2023 Emergency         Radha Fofana [facility]    1.2.840.

114 [id number] 

Univers



        14:05:00 19:22:00                 Rand Dong 350.1.13.10   

      ity of



                                                [facility] 4.2.7.2.686         [facility]  [phone]         [address]

 

        2023 Patient         Roya Sotelo  1.2.840.114 [id number]

[id number] Univers



        00:00:00 00:00:00 Outreach         E       DUNN   350.1.13.10         i

ty of



                                                [place]   4.2.7.2.686         [place]

[place]



                                                        [phone]         Medi

sarah



                                                        403             [place]

 

        2023 Patient         AshleyMary Anne malloy [facility]    1.2.840.114 [id number]

70 Univers



        00:00:00 00:00:00 Secure Msg                 HEALTH  350.1.13.10        

 ity of



                                                [facility] 4.2.7.2.686         Eric

as



                                                HÉCTOR?BLEA [phone]         Me

dicandrew RUIZEY    [address]             [place]



                                                MEDICAL                 



                                                OFFICE                  



                                                BUILDING                 

 

        2022 [facility] R       [facility]    [id number]

7713 Univers



        13:00:00 15:17:50                 SUREKHA                           ity [facility]

 

        2022 Office          [facility]    1.2.840.114 [id number]

428 Univers



        13:00:00 15:17:50 Visit           [facility] 350.1.13.10         i

ty of



                                                [facility] 4.2.7.2.686         [place]

[place]



                                                PROFESSIO [phone]         Me

stella



                                                [facility]     204             [facility]                 

 

        2022 Orders          Doctor  EDEN    1.2.840.114 [id number]

85 Univers



        00:00:00 00:00:00 Only            Unassigned, STEPHANIE   350.1.13.10       

  ity of



                                        [facility] 4.2.7.2.686         Eric

as



                                                        [phone]         Medi

sarah



                                                        009             [place]

 

        2022 Outpatient R       MARY ANNE RAMIREZ [facility]    [id number]

401 Univers



        13:00:00 13:39:07                 MARY ANNE RAMIREZ                         itlissa [facility]

 

        2022 Office          Ashley [facility]    1.2.840.114 [id number]

19 Univers



        13:00:00 13:39:07 Visit                   HEALTH  350.1.13.10         it

y of



                                                [place] 4.2.7.2.686         Eric

as



                                                HÉCTOR?BLEA [phone]         Me

stella HERNANDEZ    220             [place]



                                                MEDICAL                 



                                                OFFICE                  



                                                [facility]                 

 

        2022 Technician         Lab, Ang - Db [facility]    1.2.840.1

14 [id number] 

Univers



        12:45:00 13:00:00 Visit           Mary Anne Ramirez  350.1.13.10         it

y of



                                                [facility] 4.2.7.2.686         Eric

as



                                                HÉCTOR?BLEA [phone]         Me

stella HERNANDEZ    353             [place]



                                                MEDICAL                 



                                                OFFICE                  



                                                [facility]                 

 

        2022 Outpatient X       JOHN [facility]    [place]     [id number]

754 Univers



        16:26:00 17:36:00                 EDWARDO melton [facility]

 

        2022 Emergency         EugeneNeeta velasco VIV [facility]    1.2.840

.114 [id number] 

Univers



        16:26:00 17:36:00                 Edwardo Lopez 350.1.13.10    

     ity of



                                                [place] 4.2.7.2.686         [facility]  [phone]         [address]

 

        2022-10-31 2022-10-31 Emergency X       ERUM, [facility]    ERT     [id number]

47 Univers



        20:12:00 22:15:00                 ALMAHARSHAL melton [facility]

 

        2022-10-31 2022-10-31 Emergency         [facility]    1.2.613.581 [id number]

4196 Univers



        20:12:00 22:15:00                 Alma MCCARTHY 350.1.13.10         

ity of



                                                [place] 4.2.7.2.686         [facility]  [phone]         [address]

 

        2022-10-29 2022-10-29 Emergency X       BARTRIMA, [facility]    ERT     [id number]

67 Univers



        19:14:00 22:40:00                 SILVINO melton [facility]

 

        2022-10-29 2022-10-29 Emergency         [facility]    1.2.133.664 [id number]

9139 Univers



        19:14:00 22:40:00                 Silvino MCCARTHY 350.1.13.10         

ity of



                                                [facility] 4.2.7.2.686         [facility]  [phone]         [address]

 

        2022-10-25 2022-10-25 Transition         IMTIAZ Guzman  1.2.840.114 [id number]

[id number] Univers



        00:00:00 00:00:00 of Care         Dulce DUNN   350.1.13.10        

 ity 



                                                DARIELA   4.2.7.2.686         [place]

[place]



                                                        [phone]         Medi

sarah



                                                        403             [place]

 

        2022-10-21 2022-10-23 Inpatient X       MARCELLE [facility]     [id number]

387 Univers



        00:19:00 12:45:00                 [facility]                           ity [facility]

 

        2022-10-21 2022-10-23 [facility]         Yoni Mariscal [facility]    1.2.8

40.114 [id number] 

Univers



        00:19:00 12:45:00 Encounter         Rachel Bailey 350.1.13.10 

        ity of



                                                [place] 4.2.7.2.686         [place]

[place]



                                                CAMPUS  [phone]         Medi

sarah



                                                        081             [place]

 

        2022-10-18 2022-10-18 Outpatient R       VAMSHI [facility]    [id number]

46211 Univers



        08:00:00 08:00:00                 SAPPHIRE                         ity

 [facility]

 

        2022-10-10 2022-10-10 Outpatient R       VAMSHI [facility]    [id number]

88356 Univers



        08:00:00 08:00:00                 SAPPHIRE                         ity

 [facility]

 

        2022 Outpatient R       MARY ANNE RAMIREZ [facility]    [id number]

271 Univers



        14:00:00 14:00:00                 MARY ANNE RAMIREZ                         ity [facility]

 

        2022 Outpatient R       VAMSHI [facility]    [id number]

40007 Univers



        08:30:00 08:30:00                 SAPPHIRE                         ity

 [facility]

 

        2022 Outpatient R       VAMSHI [facility]    [id number]

38264 Univers



        08:00:00 08:00:00                 SAPPHIRE                         ity

 [facility]

 

        2022 Outpatient R       VAMSHI [facility]    [id number]

16112 Univers



        11:30:00 11:30:00                 SAPPHIRE                         ity

 [facility]

 

        2022 Telephone         EDEN Helms    1.2.840.114 [id number]

[id number] Univers



        00:00:00 00:00:00                 Yessica BROWN   350.1.13.10         i

ty of



                                                [facility] 4.2.7.2.686         Eric

as



                                                        [phone]         Medi

sarah



                                                        025             [place]

 

        2022 Telephone         [facility]    1.2.714.462 [id number]

1965 Univers



        00:00:00 00:00:00                 Jessi DENISE 350.1.13.10       

  ity of



                                                [facility]   4.2.7.2.686         [place]

[place]



                                                [place]  [phone]         Medi

sarah



                                                AND [place] [address]             [place]



                                                DIABETES                 



                                                CLINIC                  

 

        2022-08-15 2022-08-15 Transition         IMTIAZ Rodney  1.2.840.114 [id number]

61007 Univers



        00:00:00 00:00:00 of Care         Stephany DUNN   350.1.13.10         i

ty of



                                                [place]   4.2.7.2.686         [place]

[place]



                                                        [phone]         Medi

sarah



                                                        403             [place]

 

        2022 Inpatient PEDRO HUTTON [facility]    [place]     [id number]

[id number] Univers



        22:57:00 15:56:00                                                 ity of



                                                                        [facility]

 

        2022 Hospital         Yo Law  1.2.840.

114 [id number] 

Univers



        22:57:00 15:56:00 Encounter         Keenan Uribe   350.1

.13.10         ity of



                                        [facility] 4.2.7.2.686     

    [place]



                                        PhillipsLiz valle          [phone]     

    Pedro Agustin         095           

  [place]

 

        2022 Anesthesia         Sheridan Delarosa  1.2.84

0.114 [id number] 

Univers



        11:25:00 12:55:00 Event           [place]   350.1.13.10    

     ity of



                                                [facility] 4.2.7.2.686         Eric

as



                                                        [phone]         Medi

sarah



                                                        103             [place]

 

        2022 Surgery         DAVID Bonds  1.2.003.870 0994

3251 Univers



        10:12:00 11:55:00                 Sapphire STEPHANIE   350.1.13.10        

 ity of



                                        A       HOSPITAL 4.2.7.2.686         Eric

as



                                                        [phone]         Medi

sarah



                                                        103             [place]

 

        2022 Travel                  1.2.840.1 1.2.791.201 5735

9911 Univers



        00:00:00 00:00:00                         61850.1.1 350.1.13.10         

ity of



                                                3.104.2.7 4.2.7.3.698         [place]

[place]



                                                .3.335571 084.8           Medica

l



                                                .8                      [place]

 

        2022 Transition         Guzman,  1.2.840.7 [id number]

[id number] Univers



        00:00:00 00:00:00 of Care         Dulce 36411.1.1                 i

ty of



                                                3.104.2.7                 [place]



                                                .3.133432                 Medica

l



                                                .8                      [place]

 

        2022 Inpatient X       TABITHA [facility]     [id number]

04 Univers



        18:31:00 18:27:00                 SUNIL                          ity of



                                                                        [facility]

 

        2022 Hospital         Ashly Ortega 1.2.840.1 [id number]

[id number] 

Univers



        18:31:00 18:27:00 Encounter         Rcahel Bailey 57357.1.1            

     ity of



                                        James Lulani 3.104.2.7                

 [place]



                                                .3.541726                 Medica

l



                                                .8                      [place]

 

        2022 Outpatient R       KENDRICK [facility]    1041

[id number] Univers



        13:00:00 13:00:00                 RADHA melton o

f



                                                                        [facility]

 

        2022 Travel                  1.2.840.1 1.2.501.409 0522

5846 Univers



        00:00:00 00:00:00                         53381.1.1 350.1.13.10         

ity of



                                                3.104.2.7 4.2.7.3.698         [place]

[place]



                                                .3.868538 084.8           Medica

l



                                                .8                      [place]

 

        2022-07-15 2022-07-15 [facility]       [facility]    [facility]     [id number]

93 Univers



        17:06:00 23:29:00                 VARINDER                         ity [facility]

 

        2022-07-15 2022-07-15 Emergency         Alondra Santos 1.2.840.1 [id number]

[id number]

                                        Univers



        17:06:00 23:29:00                 Varinder Sol 34835.1.1             

    ity of



                                                3.104.2.7                 [place]



                                                .3.268236                 Medica

l



                                                .8                      [place]

 

        2022-07-15 2022-07-15 Travel                  1.2.840.1 1.2.783.720 9153

2220 Univers



        00:00:00 00:00:00                         99747.1.1 350.1.13.10         

ity of



                                                3.104.2.7 4.2.7.3.698         [place]

[place]



                                                .3.693722 084.8           Medica

l



                                                .8                      [place]

 

        2022 [facility] R       [facility]    1040

[id number] Univers



        14:00:00 14:00:00                 RADHA melton o

f



                                                                        [facility]

 

        [facility]       [facility]    ERT     [id number]

86 Univers



        04:52:00 08:20:00                 RADHA                         ity [facility]

 

        2022 Emergency         Brigido, 1.2.840.4 [id number]

55021 Univers



        04:52:00 08:20:00                 Radha 15361.1.1                 ity 

of



                                                3.104.2.7                 [place]



                                                .3.984705                 Medica

l



                                                .8                      [place]

 

        2022 Travel                  1.2.840.1 1.2.382.542 7672

5013 Univers



        00:00:00 00:00:00                         88423.1.1 350.1.13.10         

ity of



                                                3.104.2.7 4.2.7.3.698         [place]

[place]



                                                .3.954789 084.8           Medica

l



                                                .8                      [place]

 

        2022 Transition         Guzman,  1.2.840.6 [id number] 94

[id number] Univers



        00:00:00 00:00:00 of Care         Dulce 27771.1.1                 i

ty of



                                                3.104.2.7                 [place]



                                                .3.206716                 Medica

l



                                                .8                      [place]

 

        2022 Inpatient X       KENDRICK [facility]    [place]     [id number]

52144 Univers



        20:31:00 21:18:00                 KEM                           ity of



                                                                        [facility]

 

        2022 [facility]         Radha Fofana 1.2.840.1 [id number]

[id number] 

Univers



        20:31:00 21:18:00 Encounter         Noe Phillips 67065.1.1              

   ity of



                                        [place] 3.104.2.7              

   [place]



                                                .3.063634                 Medica

l



                                                .8                      [place]

 

        2022 Transition         Guzman,  1.2.840.0 [id number]

[id number] Univers



        00:00:00 00:00:00 of Care         Dulce 49456.1.1                 i

ty of



                                                3.104.2.7                 [place]



                                                .3.383740                 Medica

l



                                                .8                      [place]

 

        2022 Travel                  1.2.840.1 1.2.227.811 4777

6235 Univers



        00:00:00 00:00:00                         48473.1.1 350.1.13.10         

ity of



                                                3.104.2.7 4.2.7.3.698         [place]

[place]



                                                .3.081759 084.8           Medica

l



                                                .8                      [place]

 

        2022 Transition         Guzman,  1.2.840.2 [id number]

[id number] Univers



        00:00:00 00:00:00 of Care         Dulce 79434.1.1                 i

ty of



                                                3.104.2.7                 [place]



                                                .3.220219                 Medica

l



                                                .8                      [place]

 

        2022 Inpatient X       JOHN [facility]     [id number]

48 Univers



        22:21:00 16:16:00                 EDWARDO                           ity of



                                                                        [facility]

 

        [facility]22 [facility] 1.2.840.1 [id number]

[id number] 

Univers



        22:21:00 16:16:00 Encounter         Edwardo Lopez 78858.1.1             

    ity of



                                                3.104.2.7                 [place]



                                                .3.820121                 Medica

l



                                                .8                      [place]

 

        2022 Travel                  1.2.840.1 1.2.675.446 0033

3062 Univers



        00:00:00 00:00:00                         13143.1.1 350.1.13.10         

ity of



                                                3.104.2.7 4.2.7.3.698         [place]

[place]



                                                .3.677357 084.8           Medica

l



                                                .8                      [place]

 

        2022 2022-06-10 Outpatient X       KRZYSZTOFSOFIA [facility]     [id number]

8802 Univers



        18:25:00 19:30:00                 [facility]                           ity of



                                                                        [facility]

 

        2022 2022-06-10 Emergency         Shelton Cunningham 1.2.840.1 [id number]

[id number] 

Univers



        18:25:00 19:30:00                 KrzysztofRachel black 16531.1.1              

   ity of



                                                3.104.2.7                 [place]



                                                .3.758298                 Medica

l



                                                .8                      [place]

 

        2022 Travel                  1.2.840.1 1.2.335.733 0998

2645 Univers



        00:00:00 00:00:00                         23335.1.1 350.1.13.10         

ity of



                                                3.104.2.7 4.2.7.3.698         [place]

[place]



                                                .3.367118 084.8           Medica

l



                                                .8                      [place]

 

        2022 Travel                  1.2.840.1 1.2.577.426 2690

5045 Univers



        00:00:00 00:00:00                         41363.1.1 350.1.13.10         

ity of



                                                3.104.2.7 4.2.7.3.698         [place]

[place]



                                                .3.909630 084.8           Medica

l



                                                .8                      [place]

 

        2022 Transition         Ronel, 1.2.840.4 [id number]

[id number] Univers



        00:00:00 00:00:00 of Care         Stephany M 85431.1.1                 ity

 of



                                                3.104.2.7                 [place]



                                                .3.971746                 Medica

l



                                                .8                      Branch

 

        2022 Inpatient X       SHAIKH [facility]    [place]     [id number]

83 Univers



        20:53:00 18:25:00                 [facility] [facility]                         ity o

f



                                                                        [facility]

 

        2022 [facility]         Radha Fofana 1.2.840.1 [id number]

[id number] 

Univers



        20:53:00 18:25:00 Encounter         Tobias Hernandez 15383.1.1           

      ity of



                                        Terminella, Efrain 3.104.2.7             

    [place]



                                        Kasey Powella H .[phone]               

  [facility]                     

 Branch

 

        2022 Travel                  1.2.840.1 1.2.091.698 [id number]

[id number] Univers



        00:00:00 00:00:00                         42967.1.1 350.1.13.10         

ity of



                                                3.104.2.7 4.2.7.3.698         [place]

[place]



                                                .[id number] 084.8           Medica

l



                                                .8                      [place]

 

        2022 Transition         Guzman,  1.2.840.6 [id number]

[id number] Univers



        00:00:00 00:00:00 of Care         Dulce 35273.1.1                 i

ty of



                                                3.104.2.7                 [place]



                                                .[phone]                 Medica

l



                                                .8                      [place]

 

        2022-05-10 2022 Inpatient X       LEA [facility]    KRISH     [id number]

201 Univers



        19:10:00 17:32:00                 ADNAN                           ity of



                                                                        [facility]

 

        2022-05-10 2022 [facility]         Radha Fofana 1.2.840.1 [id number]

[id number] 

Univers



        19:10:00 17:32:00 Encounter         Rand Dong 71555.1.1            

     ity of



                                                3.104.2.7                 [place]



                                                .[phone]                 Medica

l



                                                .8                      [place]

 

        2022-05-10 2022-05-10 Travel                  1.2.840.1 1.2.882.440 [id number]

6685 Univers



        00:00:00 00:00:00                         95402.1.1 350.1.13.10         

ity of



                                                3.104.2.7 4.2.7.3.698         [place]

[place]



                                                .[id number] 084.8           Medica

l



                                                .8                      [place]

 

        [facility]

[id number] Memoria



        12:00:00 22:50:00 n                       [facility] 98      [facility]

 

        2022 Observatio                 [facility] 5510

[id number] Memoria



        12:00:00 22:50:00 n                       [address]

 

        2022 Outpatient U       [place], [facility]    2098

    [facility]



        07:00:00 17:50:00                 [facility]                          

 

        2022 Outpatient         [place], [facility]   5510

[id number] 



        07:00:00 17:50:00                 Romero Lee                       

 

        2022 Outpatient         [place]   5510

[id number] 



        18:14:00 18:14:00                 Romero Crooks                       

 

        2022 [facility]       [facility]     [id number]

29 Univers



        22:45:00 01:52:00                 VARINDER melton [facility]

 

        2022 Emergency         [facility]    1.2.016.762 [id number]

2685 Univers



        22:45:00 01:52:00                 Varinder MCCARTHY 350.1.13.10         [facility] 4.2.7.2.686         [facility]  [phone]         Medi

sarah



                                                        084             [place]

 

        2022 Inpatient                 [facility] 66654

[id number] Memoria



        05:12:00 15:30:00                         r       [place] [address]      [facility]

 

        2022 Inpatient                 [facility] 13163

[id number] Memoria



        05:12:00 15:30:00                         r       [address]

 

        2022 Outpatient         Ap,  [facility]   [id number]

[id number] 



        00:12:00 10:30:00                 Bernardo Guajardo                         

 

        2022 Emergency                 [facility] [address]

65270 Memoria



        02:51:00 02:51:00                         r       [address]      [facility]

 

        2022 Emergency                 [facility] 97165

52361 Memoria



        02:51:00 02:51:00                         r       [address]      [facility]

 

        2022 Outpatient         Ap,  [facility]   [id number]

[id number] 



        00:12:00 00:12:00                 Bernardo Guajardo                         

 

        2022 Outpatient         Ap,  [facility]   [id number]

620 



        21:51:00 21:51:00                 Bernardo Guajardo                         

 

        2022 Emergency X       LINDA, K [facility]    ERT     [id number]

[id number] Univers



        18:08:00 22:51:00                                                 ity [facility]

 

        2022 Emergency         ALLISON Romeo [facility]    1.2.840.114 [id number]

[id number] Univers



        18:08:00 22:51:00                 Elena MCCARTHY 350.1.13.10         i

ty of



                                                [facility] 4.2.7.2.686         [place]

[place]



                                                [place]  [phone]         Medi

sarah



                                                        084             [place]

 

        2022 Transition         IMTIAZ Guzman  1.2.840.114 907

[id number] Univers



        00:00:00 00:00:00 of Care         Dulce DUNN   350.1.13.10        

 ity of



                                                DARIELAZA   4.2.7.2.686         [place]

[place]



                                                        [phone]         Medi

sarah



                                                        403             [place]

 

        2022 Inpatient X       LESLEY [facility]    KRISH     [id number]

27 Univers



        19:07:00 19:39:00                 SILVINO                          ity [facility]

 

        2022 [facility]         Silvino Garay [facility]    1.2.840.

114 [id number] 

Univers



        19:07:00 19:39:00 Encounter         Rachel Bailey SHARI 350.1.13.10 

        ity of



                                                [facility] 4.2.7.2.686         [facility]  [phone]         [address]

 

        2022 Emergency X       CACACE, [facility]    ERT     [id number]

54 Univers



        14:41:00 22:07:00                 ALMA                          [facility]

 

        2022 Emergency         Cacace, [facility]    1.2.077.935 [id number]

7030 Univers



        14:41:00 22:07:00                 Alma MCCARTHY 350.1.13.10         

ity 



                                                [facility] 4.2.7.2.686         [facility]  [phone]         [address]

 

        2022-01-15 2022-01-15 Emergency X       [facility]    ERT     [id number]

25 Univers



        14:49:00 21:33:00                 [facility]

 

        2022-01-15 2022-01-15 Emergency         [facility]    1.2.675.198 [id number]

0725 Univers



        14:49:00 21:33:00                 Neeta MCCARTHY 350.1.13.10         

ity 



                                                [facility] 4.2.7.2.686         [facility]  [phone]         [address]

 

        2021 Laboratory         Only, Ang Db Test [facility]    1.2.8

40.114 [id number] 

Univers



        18:00:00 18:15:00 Only            Stefano Llanes  350.1.13.10      

   ity [facility] 4.2.7.2.686         Eric

as



                                                HÉCTOR?BLEA [phone]         [address]



                                                MEDICAL                 



                                                OFFICE                  



                                                BUILDING                 

 

        2021 Outpatient R       HI   [facility]    [id number]

[id number] Univers



        18:00:00 18:00:00                 STEFANO melton [facility]

 

        2021 (TEL)                   [facility]  [id number] Co

mmon



        00:00:00 00:00:00                                                 [facility]

 

        2021 (ESTPT)                 STLMLC  STLMLC  [id number] Co

mmon



        00:00:00 00:00:00 Establishe                                         Spi

rit



                        d Patient                                         - [facility]



                                                                        [facility]

 

        2021 [facility] X       [facility]    ERT     [id number]

74 Univers



        15:54:00 18:34:00                 VARINDER                         geronimo [facility]

 

        2021 Emergency         [facility]    1.2.085.860 [id number]

8236 Univers



        15:54:00 18:34:00                 Varinder MCCARTHY 350.1.13.10         i

ty [facility] 4.2.7.2.686         [facility]  [phone]         [address]

 

        2021-10-12 2021-10-12 (TEL)                   STLMLC  STLMLC  [id number] Co

mmon



        00:00:00 00:00:00                                                 [facility]

 

        2021 (ESTPT)                 STLMLC  STLMLC  [id number] Co

mmon



        00:00:00 00:00:00 Establishe                                         Spi

rit



                        d Patient                                         - [facility]

 

        2021-08-10 2021-08-10 (TEL)                   STLMLC  STLMLC  [id number] Co

mmon



        00:00:00 00:00:00                                                 [facility]

 

        2021 (ESTPT)                 STLMLC  STLMLC  [id number] Co

mmon



        00:00:00 00:00:00 Establishe                                         Spi

rit



                        d Patient                                         - [facility]



                                                                        [facility]

 

        2021 OFFICE                  STLMLC  STLMLC  [id number] Co

mmon



        00:00:00 00:00:00 VISIT NEW                                         Lists of hospitals in the [place]

it



                        PT LEVEL 3                                         - [facility]



                                                                        [facility]

 

        2021 Emergency         \[facility]\""    1.2.840.114 84

[id number] 



        18:22:00 22:21:00                 Bossman Mccarthy 350.1.13.10        

 



                                                [place] 4.2.7.2.686         



                                                [place]  [phone]         



                                                        [facility]             

 

        2021 Emergency         \[facility]\""    1.2.840.114 [id number]

[id number] Univers



        18:22:00 22:21:00                 Kodilavell TWAN BrisenoColeman 350.1.13.10        

 ity [facility] 4.2.7.2.686         [facility]  [phone]         Medi

sarah



                                                        084             [place]

 

        2021 Emergency X       DANIA [facility]    ERT     [id number]

4744 Univers



        18:22:00 18:22:00                 BOSSMAN                         ity [facility]

 

        2019 Phone                   nullFlavo MNA     [id number]

55 Memoria



        16:11:26 04:59:59 Message                 r       Neurosurger 08      l



                                                        y [facility]

 

        2019 Phone                   nullFlavo MNA     [id number]

55 Memoria



        16:11:26 04:59:59 Message                 r       Neurosurger 08      l



                                                        y [facility]

 

        2019 Outpatient                 MHMISCHER MHMISCHER 551

[id number] 



        11:11:26 23:59:59                                               

 

        2019 Phone                   nullFlavo MNA     [id number]

55 Memoria



        16:16:47 04:59:59 Message                 r       Neurosurger 07      l



                                                        y [facility]

 

        2019 Phone                   nullFlavo MNA     [id number]

55 Memoria



        16:16:47 04:59:59 Message                 r       Neurosurger 07      l



                                                        y [facility]

 

        2019 Outpatient                 MHMISCHER MHMISCHER 551

[id number] 



        11:16:47 23:59:59                                         07      

 

        2019-07-15 2019 Phone                   nullFlavo MNA     [id number]

55 Memoria



        15:52:03 04:59:59 Message                 r       Neurosurger 06      l



                                                        y [facility]

 

        2019-07-15 2019 Phone                   nullFlavo MNA     [id number]

55 Memoria



        15:52:03 04:59:59 Message                 r       Neurosurger 06      l



                                                        y [facility]

 

        2019-07-15 2019 Outpatient                 MHMISCHER MHMISCHER 551

[id number] 



        10:52:03 23:59:59                                         2019 Phone                   nullFlavo MNA     [id number]

55 Memoria



        18:55:03 04:59:59 Message                 r       Neurosurger 05      l



                                                        y [facility]

 

        2019 Phone                   nullFlavo MNA     [id number]

55 Memoria



        18:55:03 04:59:59 Message                 r       Neurosurger 05      l



                                                        y [facility]

 

        2019 Outpatient                 [facility]

[id number] 



        13:55:03 23:59:59                                         2018 Emergency                 nullFlavo [facility] 18361

92738 Memoria



        10:12:00 18:24:00                         [address]

 

        2018 Emergency                 nullFlavo [facility] 77918

95504 Memoria



        10:12:00 18:24:00                         [address]

 

        2018 Outpatient         Vijay [facility]   5510

[id number] 



        04:12:00 12:24:00                 Deisy Dial      

 

        2018 Outpatient                 [facility]

9[address]59 Common



        11:15:00 11:15:00                         [place]   [address]         Spir

it



                                                Drive   [facility]

 

        2018 Outpatient                 [facility] [address]

[id number] Common



        11:30:00 11:30:00                         [place]   [address]         Spir

it



                                                Drive   [facility]

 

        2018 Outpatient                 [facility]

[id number] Common



        15:41:00 15:41:00                         [place]   [address]         Spir

it



                                                Drive   [facility]

 

        2018 Outpatient                 [facility]

[id number] Common



        15:42:00 15:42:00                         [place]   [address]         Spir

it



                                                Drive   [facility]

 

        2018 Outpatient                 [facility] [address]

[id number] Common



        11:00:00 11:00:00                         [place]   [address]         Spir

it



                                                Drive   [facility]

 

        2018 Inpatient                 nullFlavo [facility] [address]00

28080 Memoria



        22:40:00 17:28:00                         [address]

 

        2018 Inpatient                 [facility] [address]007

72022 Memoria



        22:40:00 17:28:00                         [address]

 

        2018 Outpatient         Deedee Reinoso [facility]   551

[id number] 



        17:40:00 12:28:00                 Jamie                   23      







Results







           Test Description Test Time  Test Comments Results    Result Comments 

Source









                    COMP. METABOLIC PANEL (27381) 2023 21:13:07 









                      Test Item  Value      Reference Range Interpretation Comme

nts









             NA (test code = 9262183847) 131 mmol/L   135-145      L            

 

             K (test code = 3981835116) 4.8 mmol/L   3.5-5.0                   

 

             CL (test code = 7919470818) 100 mmol/L                       

 

             CO2 TOTAL (test code = 0015057066) 22 mmol/L    23-31        L     

       

 

             AGAP (test code = 7855125611) 9            2-16                    

  

 

             BUN (test code = 7688789134) 14 mg/dL     7-23                     

 

 

             GLUCOSE (test code = 4198849751) 396 mg/dL           H       

     

 

             CREATININE (test code = 0.63 mg/dL   0.60-1.25                 



             3837781854)                                         

 

             TOTAL BILI (test code = 0.7 mg/dL    0.1-1.1                   



             4140337252)                                         

 

             CALCIUM (test code = 2597749331) 9.7 mg/dL    8.6-10.6             

     

 

             T PROTEIN (test code = 4945130248) 7.8 g/dL     6.3-8.2            

       

 

             ALBUMIN (test code = 1554805111) 4.3 g/dL     3.5-5.0              

     

 

             ALK PHOS (test code = 3990185845) 146 U/L             H      

      

 

             ALTv (test code = 1742-6) 21 U/L       5-50                      

 

             AST(SGOT) (test code = 2772172176) 15 U/L       13-40              

       

 

             eGFR (test code = 5429241983) 143.3        mL/min/1.73m2           

   

 

             MAL (test code = MAL) Association of Glomerular                    

       



                          Filtration Rate (GFR) and Staging                     

      



                          of Kidney Disease*                           



                          +-----------------------+--------                     

      



                          -------------+-------------------                     

      



                          ------+| GFR (mL/min/1.73 m2) ?|                      

     



                          With Kidney Damage ?| ?Without                        

   



                          Kidney                                 



                          Damage+-----------------------+--                     

      



                          -------------------+-------------                     

      



                          ------------+| ?>90 ? ? ? ? ? ? ?                     

      



                          ? ?| ?Stage one ? ? ? ? ?| ?                          

 



                          Normal ? ? ? ? ? ? ?                           



                          ?+-----------------------+-------                     

      



                          --------------+------------------                     

      



                          -------+| ?60-89 ? ? ? ? ? ? ? ?|                     

      



                          ?Stage two ? ? ? ? ?| ? Decreased                     

      



                          GFR ? ? ? ?                            



                          +-----------------------+--------                     

      



                          -------------+-------------------                     

      



                          ------+| ?30-59 ? ? ? ? ? ? ? ?|                      

     



                          ?Stage three ? ? ? ?| ? Stage                         

  



                          three ? ? ? ? ?                           



                          +-----------------------+--------                     

      



                          -------------+-------------------                     

      



                          ------+| ?15-29 ? ? ? ? ? ? ? ?|                      

     



                          ?Stage four ? ? ? ? | ? Stage                         

  



                          four ? ? ? ? ?                           



                          ?+-----------------------+-------                     

      



                          --------------+------------------                     

      



                          -------+| ?<15 (or dialysis) ? ?|                     

      



                          ?Stage five ? ? ? ? | ? Stage                         

  



                          five ? ? ? ? ?                           



                          ?+-----------------------+-------                     

      



                          --------------+------------------                     

      



                          -------+ *Each stage assumes the                      

     



                          associated GFR level has been in                      

     



                          effect for at least three months.                     

      



                          ?Stages 1 to 5, with or without                       

    



                          kidney disease, indicate chronic                      

     



                          kidney disease. Notes:                           



                          Determination of stages one and                       

    



                          two (with eGFR >59mL/min/1.73 m2)                     

      



                          requires estimation of kidney                         

  



                          damage for at least three months                      

     



                          as defined by structural or                           



                          functional abnormalities of the                       

    



                          kidney, manifested by                           



                          either:Pathological abnormalities                     

      



                          or Markers of kidney damage                           



                          (including abnormalities in the                       

    



                          composition of the blood or urine                     

      



                          or abnormalities in imaging                           



                          tests).                                

 

             Lab Interpretation (test code = Abnormal                           

    



             14792-8)                                            



[facility] WITH [id number] 21:09:28





             Test Item    Value        Reference Range Interpretation Comments

 

             WBC (test code = 12.68        See_Comment  H             [Automated



             7530-2)                                             message] The sy

stem



                                                                 which generated



                                                                 this result



                                                                 transmitted



                                                                 reference range

:



                                                                 4.20 - 10.70



                                                                 10*3/?L. The



                                                                 reference range

 was



                                                                 not used to



                                                                 interpret this



                                                                 result as



                                                                 normal/abnormal

.

 

             RBC (test code = 5.46         See_Comment                [Automated



             230-8)                                              message] The sy

stem



                                                                 which generated



                                                                 this result



                                                                 transmitted



                                                                 reference range

:



                                                                 4.26 - 5.52



                                                                 10*6/?L. The



                                                                 reference range

 was



                                                                 not used to



                                                                 interpret this



                                                                 result as



                                                                 normal/abnormal

.

 

             HGB (test code = 16.2 g/dL    12.2-16.4                 



             718-7)                                              

 

             HCT (test code = 46.5 %       38.4-49.3                 



             4544-3)                                             

 

             MCV (test code = 85.2 fL      81.7-95.6                 



             787-2)                                              

 

             MCH (test code = 29.7 pg      26.1-32.7                 



             785-6)                                              

 

             MCHC (test code = 34.8 g/dL    31.2-35.0                 



             786-4)                                              

 

             RDW-SD (test code = 39.9 fL      38.5-51.6                 



             42254-8)                                            

 

             RDW-CV (test code = 13.1 %       12.1-15.4                 



             788-0)                                              

 

             PLT (test code = 444          See_Comment  H             [Automated



             777-3)                                              message] The sy

stem



                                                                 which generated



                                                                 this result



                                                                 transmitted



                                                                 reference range

:



                                                                 150 - 328 10*3/

?L.



                                                                 The reference r

zhen



                                                                 was not used to



                                                                 interpret this



                                                                 result as



                                                                 normal/abnormal

.

 

             MPV (test code = 10.0 fL      9.8-13.0                  



             74347-2)                                            

 

             NRBC/100 WBC (test 0.0          See_Comment                [Automat

ed



             code = 7970920403)                                        message] 

The system



                                                                 which generated



                                                                 this result



                                                                 transmitted



                                                                 reference range

:



                                                                 0.0 - 10.0 /100



                                                                 WBCs. The refer

ence



                                                                 range was not u

sed



                                                                 to interpret th

is



                                                                 result as



                                                                 normal/abnormal

.

 

             NRBC x10^3 (test code              See_Comment                [Auto

mated



             = 1895441761)                                        message] The s

ystem



                                                                 which generated



                                                                 this result



                                                                 transmitted



                                                                 reference range

:



                                                                 10*3/?L. The



                                                                 reference range

 was



                                                                 not used to



                                                                 interpret this



                                                                 result as



                                                                 normal/abnormal

.

 

             GRAN MAT (NEUT) % 76.0 %                                 



             (test code = 770-8)                                        

 

             IMM GRAN % (test code 0.30 %                                 



             = 5699145759)                                        

 

             LYMPH % (test code = 14.0 %                                 



             736-9)                                              

 

             MONO % (test code = 7.6 %                                  



             5905-5)                                             

 

             EOS % (test code = 1.8 %                                  



             713-8)                                              

 

             BASO % (test code = 0.3 %                                  



             706-2)                                              

 

             GRAN MAT x10^3(ANC) 9.63 10*3/uL 1.99-6.95    H            



             (test code =                                        



             0387069937)                                         

 

             IMM GRAN x10^3 (test 0.04 10*3/uL 0.00-0.06                 



             code = 7308112646)                                        

 

             LYMPH x10^3 (test code 1.77 10*3/uL 1.09-3.23                 



             = 731-0)                                            

 

             MONO x10^3 (test code 0.97 10*3/uL 0.36-1.02                 



             = 742-7)                                            

 

             EOS x10^3 (test code = 0.23 10*3/uL 0.06-0.53                 



             711-2)                                              

 

             BASO x10^3 (test code 0.04 10*3/uL 0.01-0.09                 



             = 704-7)                                            

 

             Lab Interpretation Abnormal                               



             (test code = 69530-0)                                        



[facility] GLUCOSE(AGE &gt;30DAYS)2023 
20:33:00





             Test Item    Value        Reference Range Interpretation Comments

 

             POCT Glu (age>30days) (test code = 429 mg/dL           A     

       



             3342)                                               

 

             Lab Interpretation (test code = Abnormal                           

    



             87082-3)                                            



[facility] GLUCOSE (AUTOMATED)2023 22:47:23





             Test Item    Value        Reference Range Interpretation Comments

 

             POCT GLU (test code = 1520602342) 352 mg/dL           H      

      

 

             Lab Interpretation (test code = Abnormal                           

    



             66574-9)                                            



[facility] GLUCOSE (AUTOMATED)2023 19:38:18





             Test Item    Value        Reference Range Interpretation Comments

 

             POCT GLU (test code = 7906927589) 452 mg/dL           HH     

      

 

             Lab Interpretation (test code = Abnormal                           

    



             49856-9)                                            



[facility] GLUCOSE (AUTOMATED)2023-04-10 02:41:30





             Test Item    Value        Reference Range Interpretation Comments

 

             POCT GLU (test code = 9028798382) 379 mg/dL           H      

      

 

             Lab Interpretation (test code = Abnormal                           

    



             99319-9)                                            



[facility]. METABOLIC PANEL (43379)2023-04-10 
00:29:35





             Test Item    Value        Reference Range Interpretation Comments

 

             NA (test code = 133 mmol/L   135-145      L            



             8994536772)                                         

 

             K (test code = 3.5 mmol/L   3.5-5.0                   



             3499452538)                                         

 

             CL (test code = 97 mmol/L           L            



             9049223123)                                         

 

             CO2 TOTAL (test code = 26 mmol/L    23-31                     



             7396770307)                                         

 

             AGAP (test code = 10           2-16                      



             1464600208)                                         

 

             BUN (test code = 7 mg/dL      7-23                      



             6121930006)                                         

 

             GLUCOSE (test code = 468 mg/dL           HH           



             5383729218)                                         

 

             CREATININE (test code = 0.58 mg/dL   0.60-1.25    L            



             3217858202)                                         

 

             TOTAL BILI (test code = 0.6 mg/dL    0.1-1.1                   



             2222028065)                                         

 

             CALCIUM (test code = 9.5 mg/dL    8.6-10.6                  



             5550427069)                                         

 

             T PROTEIN (test code = 7.6 g/dL     6.3-8.2                   



             3306348742)                                         

 

             ALBUMIN (test code = 4.2 g/dL     3.5-5.0                   



             8257224487)                                         

 

             ALK PHOS (test code = 197 U/L             H            



             9689442204)                                         

 

             ALTv (test code = 56 U/L       5-50         H            



             1742-6)                                             

 

             AST(SGOT) (test code = 16 U/L       13-40                     



             3684712937)                                         

 

             eGFR (test code = 157.7        mL/min/1.73m2              



             3117617590)                                         

 

             MAL (test code = MAL) Association of                           



                          Glomerular Filtration                           



                          Rate (GFR) and Staging                           



                          of Kidney Disease*                           



                          +---------------------                           



                          --+-------------------                           



                          --+-------------------                           



                          ------+| GFR                           



                          (mL/min/1.73 m2) ?|                           



                          With Kidney Damage ?|                           



                          ?Without Kidney                           



                          Damage+---------------                           



                          --------+-------------                           



                          --------+-------------                           



                          ------------+| ?>90 ?                           



                          ? ? ? ? ? ? ? ?|                           



                          ?Stage one ? ? ? ? ?|                           



                          ? Normal ? ? ? ? ? ? ?                           



                          ?+--------------------                           



                          ---+------------------                           



                          ---+------------------                           



                          -------+| ?60-89 ? ? ?                           



                          ? ? ? ? ?| ?Stage two                           



                          ? ? ? ? ?| ? Decreased                           



                          GFR ? ? ? ?                            



                          +---------------------                           



                          --+-------------------                           



                          --+-------------------                           



                          ------+| ?30-59 ? ? ?                           



                          ? ? ? ? ?| ?Stage                           



                          three ? ? ? ?| ? Stage                           



                          three ? ? ? ? ?                           



                          +---------------------                           



                          --+-------------------                           



                          --+-------------------                           



                          ------+| ?15-29 ? ? ?                           



                          ? ? ? ? ?| ?Stage four                           



                          ? ? ? ? | ? Stage four                           



                          ? ? ? ? ?                              



                          ?+--------------------                           



                          ---+------------------                           



                          ---+------------------                           



                          -------+| ?<15 (or                           



                          dialysis) ? ?| ?Stage                           



                          five ? ? ? ? | ? Stage                           



                          five ? ? ? ? ?                           



                          ?+--------------------                           



                          ---+------------------                           



                          ---+------------------                           



                          -------+ *Each stage                           



                          assumes the associated                           



                          GFR level has been in                           



                          effect for at least                           



                          three months. ?Stages                           



                          1 to 5, with or                           



                          without kidney                           



                          disease, indicate                           



                          chronic kidney                           



                          disease. Notes:                           



                          Determination of                           



                          stages one and two                           



                          (with eGFR                             



                          >59mL/min/1.73 m2)                           



                          requires estimation of                           



                          kidney damage for at                           



                          least three months as                           



                          defined by structural                           



                          or functional                           



                          abnormalities of the                           



                          kidney, manifested by                           



                          either:Pathological                           



                          abnormalities or                           



                          Markers of kidney                           



                          damage (including                           



                          abnormalities in the                           



                          composition of the                           



                          blood or urine or                           



                          abnormalities in                           



                          imaging tests).                           

 

             Lab Interpretation Abnormal                               



             (test code = 55194-9)                                        



[facility] [id number] 23:54:19





             Test Item    Value        Reference Range Interpretation Comments

 

             PH (test code = 2) 7.37         7.35-7.45                 

 

             PCO2 (test code = 47           See_Comment  H             [Automate

d



             7910403054)                                         message] The sy

stem



                                                                 which generated



                                                                 this result



                                                                 transmitted



                                                                 reference range

: 35



                                                                 - 45 mmHg. The



                                                                 reference range

 was



                                                                 not used to



                                                                 interpret this



                                                                 result as



                                                                 normal/abnormal

.

 

             PO2 (test code = 25           See_Comment  LL            [Automated



             1241052188)                                         message] The sy

stem



                                                                 which generated



                                                                 this result



                                                                 transmitted



                                                                 reference range

: 80



                                                                 - 100 mmHg. The



                                                                 reference range

 was



                                                                 not used to



                                                                 interpret this



                                                                 result as



                                                                 normal/abnormal

.

 

             HCO3 (test code = 26           See_Comment                [Automate

d



             6965542163)                                         message] The sy

stem



                                                                 which generated



                                                                 this result



                                                                 transmitted



                                                                 reference range

: 22



                                                                 - 26 mEq/L. The



                                                                 reference range

 was



                                                                 not used to



                                                                 interpret this



                                                                 result as



                                                                 normal/abnormal

.

 

             BE (test code = 0.3          See_Comment                [Automated



             9421867749)                                         message] The sy

stem



                                                                 which generated



                                                                 this result



                                                                 transmitted



                                                                 reference range

:



                                                                 -3.0 - 3.0 mEq/

L.



                                                                 The reference r

zhen



                                                                 was not used to



                                                                 interpret this



                                                                 result as



                                                                 normal/abnormal

.

 

             THB (test code = 18.4 g/dL    13.5-18.0    H            



             5747284410)                                         

 

             %O2HB (test code = 51.3 %       94.0-99.0    L            



             0126202373)                                         

 

             %COHB ART (test code = 4.3 %        0.0-1.5      H            



             0255829843)                                         

 

             %METHB ART (test code = 0.0 %        0.4-1.5      L            



             1245686192)                                         

 

             VOL%O2 ART (test code = 13.2 %       15.0-23.0    L            



             3775267177)                                         

 

             NA (test code = 136 mmol/L   135-145                   



             8521356174)                                         

 

             K+ (test code = 3.7 mmol/L   3.5-5.0                   



             9727227543)                                         

 

             AC CA IONZ (test code = 4.70 mg/dL   4.50-5.30                 



             8201256852)                                         

 

             GLUCOSE (test code = 471 mg/dL           HH           



             [id number])                                         

 

             LACTIC ACID (test code 2.17 mmol/L  0.50-2.20                 



             = 3211320626)                                        

 

             Lab Interpretation Abnormal                               



             (test code = 33955-5)                                        



[facility] WITH [id number] 23:52:19





             Test Item    Value        Reference Range Interpretation Comments

 

             WBC (test code = 9.50         See_Comment                [Automated



             6690-2)                                             message] The sy

stem



                                                                 which generated



                                                                 this result



                                                                 transmitted



                                                                 reference range

:



                                                                 4.20 - 10.70



                                                                 10*3/?L. The



                                                                 reference range

 was



                                                                 not used to



                                                                 interpret this



                                                                 result as



                                                                 normal/abnormal

.

 

             RBC (test code = 5.27         See_Comment                [Automated



             789-8)                                              message] The sy

stem



                                                                 which generated



                                                                 this result



                                                                 transmitted



                                                                 reference range

:



                                                                 4.26 - 5.52



                                                                 10*6/?L. The



                                                                 reference range

 was



                                                                 not used to



                                                                 interpret this



                                                                 result as



                                                                 normal/abnormal

.

 

             HGB (test code = 15.9 g/dL    12.2-16.4                 



             718-7)                                              

 

             HCT (test code = 46.1 %       38.4-49.3                 



             4544-3)                                             

 

             MCV (test code = 87.5 fL      81.7-95.6                 



             787-2)                                              

 

             MCH (test code = 30.2 pg      26.1-32.7                 



             785-6)                                              

 

             MCHC (test code = 34.5 g/dL    31.2-35.0                 



             786-4)                                              

 

             RDW-SD (test code = 41.8 fL      38.5-51.6                 



             54337-5)                                            

 

             RDW-CV (test code = 13.2 %       12.1-15.4                 



             788-0)                                              

 

             PLT (test code = 369          See_Comment  H             [Automated



             777-3)                                              message] The sy

stem



                                                                 which generated



                                                                 this result



                                                                 transmitted



                                                                 reference range

:



                                                                 150 - 328 10*3/

?L.



                                                                 The reference r

zhen



                                                                 was not used to



                                                                 interpret this



                                                                 result as



                                                                 normal/abnormal

.

 

             MPV (test code = 10.3 fL      9.8-13.0                  



             96743-5)                                            

 

             NRBC/100 WBC (test 0.0          See_Comment                [Automat

ed



             code = 3756549916)                                        message] 

The system



                                                                 which generated



                                                                 this result



                                                                 transmitted



                                                                 reference range

:



                                                                 0.0 - 10.0 /100



                                                                 WBCs. The refer

ence



                                                                 range was not u

sed



                                                                 to interpret th

is



                                                                 result as



                                                                 normal/abnormal

.

 

             NRBC x10^3 (test code              See_Comment                [Auto

mated



             = [id number])                                        message] The s

ystem



                                                                 which generated



                                                                 this result



                                                                 transmitted



                                                                 reference range

:



                                                                 10*3/?L. The



                                                                 reference range

 was



                                                                 not used to



                                                                 interpret this



                                                                 result as



                                                                 normal/abnormal

.

 

             GRAN MAT (NEUT) % 65.5 %                                 



             (test code = 770-8)                                        

 

             IMM GRAN % (test code 0.70 %                                 



             = 6172413525)                                        

 

             LYMPH % (test code = 24.9 %                                 



             736-9)                                              

 

             MONO % (test code = 7.8 %                                  



             5905-5)                                             

 

             EOS % (test code = 0.8 %                                  



             713-8)                                              

 

             BASO % (test code = 0.3 %                                  



             706-2)                                              

 

             GRAN MAT x10^3(ANC) 6.21 10*3/uL 1.99-6.95                 



             (test code =                                        



             3786606030)                                         

 

             IMM GRAN x10^3 (test 0.07 10*3/uL 0.00-0.06    H            



             code = 9954153255)                                        

 

             LYMPH x10^3 (test code 2.37 10*3/uL 1.09-3.23                 



             = 731-0)                                            

 

             MONO x10^3 (test code 0.74 10*3/uL 0.36-1.02                 



             = 742-7)                                            

 

             EOS x10^3 (test code = 0.08 10*3/uL 0.06-0.53                 



             711-2)                                              

 

             BASO x10^3 (test code 0.03 10*3/uL 0.01-0.09                 



             = 704-7)                                            

 

             Lab Interpretation Abnormal                               



             (test code = 41222-8)                                        



[facility]





             Test Item    Value        Reference Range Interpretation Comments

 

             POCT GLU (test code = 3751299460) 353 mg/dL           H      

      

 

             Lab Interpretation (test code = Abnormal                           

    



             57473-7)                                            



[facility]150[facility] 
22:59:53





             Test Item    Value        Reference Range Interpretation Comments

 

             NA (test code = 137 mmol/L   135-145                   



             0566085065)                                         

 

             K (test code = 5.1 mmol/L   3.5-5.0      H            



             2987714382)                                         

 

             CL (test code = 101 mmol/L                       



             9300177129)                                         

 

             CO2 TOTAL (test code = 23 mmol/L    23-31                     



             9394979520)                                         

 

             AGAP (test code = 13           2-16                      



             3021366244)                                         

 

             BUN (test code = 15 mg/dL     7-23                      



             8692869998)                                         

 

             GLUCOSE (test code = 455 mg/dL           HH           



             0682558418)                                         

 

             CREATININE (test code = 0.51 mg/dL   0.60-1.25    L            



             4019650098)                                         

 

             TOTAL BILI (test code = 0.7 mg/dL    0.1-1.1                   



             6003844000)                                         

 

             CALCIUM (test code = 9.8 mg/dL    8.6-10.6                  



             5903573341)                                         

 

             T PROTEIN (test code = 8.1 g/dL     6.3-8.2                   



             6007046323)                                         

 

             ALBUMIN (test code = 4.3 g/dL     3.5-5.0                   



             8882021297)                                         

 

             ALK PHOS (test code = 161 U/L             H            



             4953009130)                                         

 

             ALTv (test code = 34 U/L       5-50                      



             2-6)                                             

 

             AST(SGOT) (test code = 26 U/L       13-40                     



             6715838323)                                         

 

             eGFR (test code = 182.9        mL/min/1.73m2              



             4228385348)                                         

 

             MAL (test code = MAL) Association of                           



                          Glomerular Filtration                           



                          Rate (GFR) and Staging                           



                          of Kidney Disease*                           



                          +---------------------                           



                          --+-------------------                           



                          --+-------------------                           



                          ------+| GFR                           



                          (mL/min/1.73 m2) ?|                           



                          With Kidney Damage ?|                           



                          ?Without Kidney                           



                          Damage+---------------                           



                          --------+-------------                           



                          --------+-------------                           



                          ------------+| ?>90 ?                           



                          ? ? ? ? ? ? ? ?|                           



                          ?Stage one ? ? ? ? ?|                           



                          ? Normal ? ? ? ? ? ? ?                           



                          ?+--------------------                           



                          ---+------------------                           



                          ---+------------------                           



                          -------+| ?60-89 ? ? ?                           



                          ? ? ? ? ?| ?Stage two                           



                          ? ? ? ? ?| ? Decreased                           



                          GFR ? ? ? ?                            



                          +---------------------                           



                          --+-------------------                           



                          --+-------------------                           



                          ------+| ?30-59 ? ? ?                           



                          ? ? ? ? ?| ?Stage                           



                          three ? ? ? ?| ? Stage                           



                          three ? ? ? ? ?                           



                          +---------------------                           



                          --+-------------------                           



                          --+-------------------                           



                          ------+| ?15-29 ? ? ?                           



                          ? ? ? ? ?| ?Stage four                           



                          ? ? ? ? | ? Stage four                           



                          ? ? ? ? ?                              



                          ?+--------------------                           



                          ---+------------------                           



                          ---+------------------                           



                          -------+| ?<15 (or                           



                          dialysis) ? ?| ?Stage                           



                          five ? ? ? ? | ? Stage                           



                          five ? ? ? ? ?                           



                          ?+--------------------                           



                          ---+------------------                           



                          ---+------------------                           



                          -------+ *Each stage                           



                          assumes the associated                           



                          GFR level has been in                           



                          effect for at least                           



                          three months. ?Stages                           



                          1 to 5, with or                           



                          without kidney                           



                          disease, indicate                           



                          chronic kidney                           



                          disease. Notes:                           



                          Determination of                           



                          stages one and two                           



                          (with eGFR                             



                          >59mL/min/1.73 m2)                           



                          requires estimation of                           



                          kidney damage for at                           



                          least three months as                           



                          defined by structural                           



                          or functional                           



                          abnormalities of the                           



                          kidney, manifested by                           



                          either:Pathological                           



                          abnormalities or                           



                          Markers of kidney                           



                          damage (including                           



                          abnormalities in the                           



                          composition of the                           



                          blood or urine or                           



                          abnormalities in                           



                          imaging tests).                           

 

             Lab Interpretation Abnormal                               



             (test code = 57745-3)                                        



[facility]52[facility]58





             Test Item    Value        Reference Range Interpretation Comments

 

             MAGNESIUM (test code = 8038595339) 1.7 mg/dL    1.7-2.4            

       

 

             Lab Interpretation (test code = Normal                             

    



             59521-7)                                            



[facility] 22:52[facility]38





             Test Item    Value        Reference Range Interpretation Comments

 

             LIPASE (test code = 8421330003) 106 U/L      0-220                 

    

 

             Lab Interpretation (test code = Normal                             

    



             75177-8)                                            



[facility] 22:37:17





             Test Item    Value        Reference Range Interpretation Comments

 

             POCT GLU (test code = 5693410654) 425 mg/dL           H      

      

 

             Lab Interpretation (test code = Abnormal                           

    



             97445-1)                                            



[facility] [id number] 22:26:01





             Test Item    Value        Reference Range Interpretation Comments

 

             LACTIC ACID (test code = 2.67 mmol/L  0.50-2.20    H            



             9496665387)                                         

 

             Lab Interpretation (test code = Abnormal                           

    



             85164-8)                                            



[facility] WITH [id number] 22:24:50





             Test Item    Value        Reference Range Interpretation Comments

 

             WBC (test code = 12.05        See_Comment  H             [Automated



             2790-2)                                             message] The sy

stem



                                                                 which generated



                                                                 this result



                                                                 transmitted



                                                                 reference range

:



                                                                 4.20 - 10.70



                                                                 10*3/?L. The



                                                                 reference range

 was



                                                                 not used to



                                                                 interpret this



                                                                 result as



                                                                 normal/abnormal

.

 

             RBC (test code = 5.14         See_Comment                [Automated



             359-8)                                              message] The sy

stem



                                                                 which generated



                                                                 this result



                                                                 transmitted



                                                                 reference range

:



                                                                 4.26 - 5.52



                                                                 10*6/?L. The



                                                                 reference range

 was



                                                                 not used to



                                                                 interpret this



                                                                 result as



                                                                 normal/abnormal

.

 

             HGB (test code = 15.4 g/dL    12.2-16.4                 



             718-7)                                              

 

             HCT (test code = 45.6 %       38.4-49.3                 



             4544-3)                                             

 

             MCV (test code = 88.7 fL      81.7-95.6                 



             787-2)                                              

 

             MCH (test code = 30.0 pg      26.1-32.7                 



             785-6)                                              

 

             MCHC (test code = 33.8 g/dL    31.2-35.0                 



             786-4)                                              

 

             RDW-SD (test code = 45.1 fL      38.5-51.6                 



             06453-0)                                            

 

             RDW-CV (test code = 14.0 %       12.1-15.4                 



             788-0)                                              

 

             PLT (test code = 518          See_Comment  H             [Automated



             777-3)                                              message] The sy

stem



                                                                 which generated



                                                                 this result



                                                                 transmitted



                                                                 reference range

:



                                                                 150 - 328 10*3/

?L.



                                                                 The reference r

zhen



                                                                 was not used to



                                                                 interpret this



                                                                 result as



                                                                 normal/abnormal

.

 

             MPV (test code = 10.2 fL      9.8-13.0                  



             27784-1)                                            

 

             NRBC/100 WBC (test 0.0          See_Comment                [Automat

ed



             code = 2917108896)                                        message] 

The system



                                                                 which generated



                                                                 this result



                                                                 transmitted



                                                                 reference range

:



                                                                 0.0 - 10.0 /100



                                                                 WBCs. The refer

ence



                                                                 range was not u

sed



                                                                 to interpret th

is



                                                                 result as



                                                                 normal/abnormal

.

 

             NRBC x10^3 (test code              See_Comment                [Auto

mated



             = 6708573036)                                        message] The s

ystem



                                                                 which generated



                                                                 this result



                                                                 transmitted



                                                                 reference range

:



                                                                 10*3/?L. The



                                                                 reference range

 was



                                                                 not used to



                                                                 interpret this



                                                                 result as



                                                                 normal/abnormal

.

 

             GRAN MAT (NEUT) % 76.2 %                                 



             (test code = 770-8)                                        

 

             IMM GRAN % (test code 0.50 %                                 



             = 3701214831)                                        

 

             LYMPH % (test code = 16.8 %                                 



             736-9)                                              

 

             MONO % (test code = 5.2 %                                  



             5905-5)                                             

 

             EOS % (test code = 1.0 %                                  



             713-8)                                              

 

             BASO % (test code = 0.3 %                                  



             706-2)                                              

 

             GRAN MAT x10^3(ANC) 9.18 10*3/uL 1.99-6.95    H            



             (test code =                                        



             3074572796)                                         

 

             IMM GRAN x10^3 (test 0.06 10*3/uL 0.00-0.06                 



             code = 9005105633)                                        

 

             LYMPH x10^3 (test code 2.02 10*3/uL 1.09-3.23                 



             = 731-0)                                            

 

             MONO x10^3 (test code 0.63 10*3/uL 0.36-1.02                 



             = 742-7)                                            

 

             EOS x10^3 (test code = 0.12 10*3/uL 0.06-0.53                 



             711-2)                                              

 

             BASO x10^3 (test code 0.04 10*3/uL 0.01-0.09                 



             = 704-7)                                            

 

             Lab Interpretation Abnormal                               



             (test code = 53058-1)                                        



[facility] GLUCOSE (AUTOMATED)2023 18:23:19





             Test Item    Value        Reference Range Interpretation Comments

 

             POCT GLU (test code = 5533071750) 352 mg/dL           H      

      

 

             Lab Interpretation (test code = Abnormal                           

    



             71150-1)                                            



[facility] GLUCOSE (AUTOMATED)2023 18:23:19





             Test Item    Value        Reference Range Interpretation Comments

 

             POCT GLU (test code = 8788779196) 352 mg/dL           H      

      

 

             Lab Interpretation (test code = Abnormal                           

    



             28936-8)                                            



[facility] GLUCOSE (AUTOMATED)2023 13:43:13





             Test Item    Value        Reference Range Interpretation Comments

 

             POCT GLU (test code = 3125010221) 293 mg/dL           H      

      

 

             Lab Interpretation (test code = Abnormal                           

    



             29221-7)                                            



[facility] GLUCOSE (AUTOMATED)2023 13:43:13





             Test Item    Value        Reference Range Interpretation Comments

 

             POCT GLU (test code = 6237287830) 293 mg/dL           H      

      

 

             Lab Interpretation (test code = Abnormal                           

    



             75404-0)                                            



[facility] GLUCOSE (AUTOMATED)2023 02:50:04





             Test Item    Value        Reference Range Interpretation Comments

 

             POCT GLU (test code = 6784116650) 259 mg/dL           H      

      

 

             Lab Interpretation (test code = Abnormal                           

    



             75767-9)                                            



[facility] GLUCOSE (AUTOMATED)2023 02:50:04





             Test Item    Value        Reference Range Interpretation Comments

 

             POCT GLU (test code = 9568579464) 259 mg/dL           H      

      

 

             Lab Interpretation (test code = Abnormal                           

    



             04105-0)                                            



[facility] GLUCOSE (AUTOMATED)2023 22:58:25





             Test Item    Value        Reference Range Interpretation Comments

 

             POCT GLU (test code = 4727826810) 170 mg/dL           H      

      

 

             Lab Interpretation (test code = Abnormal                           

    



             64505-6)                                            



[facility]





             Test Item    Value        Reference Range Interpretation Comments

 

             POCT GLU (test code = 3670383025) 170 mg/dL           H      

      

 

             Lab Interpretation (test code = Abnormal                           

    



             27886-9)                                            



[facility] 21:27:00





             Test Item    Value        Reference Range Interpretation Comments

 

             POCT GLU (test code = 3586392944) 179 mg/dL           H      

      

 

             Lab Interpretation (test code = Abnormal                           

    



             10868-6)                                            



[facility] 21:27:00





             Test Item    Value        Reference Range Interpretation Comments

 

             POCT GLU (test code = 6048752976) 179 mg/dL           H      

      

 

             Lab Interpretation (test code = Abnormal                           

    



             40461-9)                                            



[facility] GLUCOSE (AUTOMATED)2023 18:18:50





             Test Item    Value        Reference Range Interpretation Comments

 

             POCT GLU (test code = 4983768151) 247 mg/dL           H      

      

 

             Lab Interpretation (test code = Abnormal                           

    



             86472-1)                                            



[facility] 18:18:50





             Test Item    Value        Reference Range Interpretation Comments

 

             POCT GLU (test code = 7970837405) 247 mg/dL           H      

      

 

             Lab Interpretation (test code = Abnormal                           

    



             69389-4)                                            



[facility] 15:21:08





             Test Item    Value        Reference Range Interpretation Comments

 

             POCT GLU (test code = 4251154149) 235 mg/dL           H      

      

 

             Lab Interpretation (test code = Abnormal                           

    



             23853-1)                                            



[facility] 15:21:08





             Test Item    Value        Reference Range Interpretation Comments

 

             POCT GLU (test code = 7519229594) 235 mg/dL           H      

      

 

             Lab Interpretation (test code = Abnormal                           

    



             70011-8)                                            



[facility] 03:11:07





             Test Item    Value        Reference Range Interpretation Comments

 

             POCT GLU (test code = 1606971181) 263 mg/dL           H      

      

 

             Lab Interpretation (test code = Abnormal                           

    



             54526-5)                                            



[facility]





             Test Item    Value        Reference Range Interpretation Comments

 

             POCT GLU (test code = 8389398718) 263 mg/dL           H      

      

 

             Lab Interpretation (test code = Abnormal                           

    



             56756-7)                                            



[facility] GLUCOSE (AUTOMATED)2023 22:43:48





             Test Item    Value        Reference Range Interpretation Comments

 

             POCT GLU (test code = 8589361967) 262 mg/dL           H      

      

 

             Lab Interpretation (test code = Abnormal                           

    



             73715-6)                                            



[facility] GLUCOSE (AUTOMATED)2023 22:43:48





             Test Item    Value        Reference Range Interpretation Comments

 

             POCT GLU (test code = 7521674986) 262 mg/dL           H      

      

 

             Lab Interpretation (test code = Abnormal                           

    



             54887-2)                                            



[facility] GLUCOSE (AUTOMATED)2023 17:36:05





             Test Item    Value        Reference Range Interpretation Comments

 

             POCT GLU (test code = 8654695695) 365 mg/dL           H      

      

 

             Lab Interpretation (test code = Abnormal                           

    



             59586-0)                                            



[facility] GLUCOSE (AUTOMATED)2023 17:36:05





             Test Item    Value        Reference Range Interpretation Comments

 

             POCT GLU (test code = 5231319119) 365 mg/dL           H      

      

 

             Lab Interpretation (test code = Abnormal                           

    



             20892-0)                                            



[facility] GLUCOSE (AUTOMATED)2023 16:43:12





             Test Item    Value        Reference Range Interpretation Comments

 

             POCT GLU (test code = 0656643257) 408 mg/dL           H      

      

 

             Lab Interpretation (test code = Abnormal                           

    



             74531-8)                                            



[facility] GLUCOSE (AUTOMATED)2023 16:43:12





             Test Item    Value        Reference Range Interpretation Comments

 

             POCT GLU (test code = 6315519784) 408 mg/dL           H      

      

 

             Lab Interpretation (test code = Abnormal                           

    



             90379-1)                                            



[facility] GLUCOSE (AUTOMATED)2023 13:20:30





             Test Item    Value        Reference Range Interpretation Comments

 

             POCT GLU (test code = 4096806263) 359 mg/dL           H      

      

 

             Lab Interpretation (test code = Abnormal                           

    



             46088-0)                                            



[facility] GLUCOSE (AUTOMATED)2023 13:20:30





             Test Item    Value        Reference Range Interpretation Comments

 

             POCT GLU (test code = 3229407432) 359 mg/dL           H      

      

 

             Lab Interpretation (test code = Abnormal                           

    



             84148-8)                                            



[facility] GLUCOSE (AUTOMATED)2023 08:27:11





             Test Item    Value        Reference Range Interpretation Comments

 

             POCT GLU (test code = 8218756340) 312 mg/dL           H      

      

 

             Lab Interpretation (test code = Abnormal                           

    



             38850-7)                                            



[facility] GLUCOSE (AUTOMATED)2023 08:27:11





             Test Item    Value        Reference Range Interpretation Comments

 

             POCT GLU (test code = 1114888092) 312 mg/dL           H      

      

 

             Lab Interpretation (test code = Abnormal                           

    



             76078-7)                                            



[facility] GLUCOSE (AUTOMATED)2023 06:47:03





             Test Item    Value        Reference Range Interpretation Comments

 

             POCT GLU (test code = 9244941809) 326 mg/dL           H      

      

 

             Lab Interpretation (test code = Abnormal                           

    



             30422-5)                                            



[facility] GLUCOSE (AUTOMATED)2023 06:47:03





             Test Item    Value        Reference Range Interpretation Comments

 

             POCT GLU (test code = 0678757799) 326 mg/dL           H      

      

 

             Lab Interpretation (test code = Abnormal                           

    



             10709-4)                                            



[facility] METABOLIC PANEL (NA, K, CL, CO2, 
GLUCOSE, BUN, CREATININE, CA)2023 06:26:05





             Test Item    Value        Reference Range Interpretation Comments

 

             NA (test code = 132 mmol/L   135-145      L            



             7894680862)                                         

 

             K (test code = 4.4 mmol/L   3.5-5.0                   



             2267059865)                                         

 

             CL (test code = 101 mmol/L                       



             9222475947)                                         

 

             CO2 TOTAL (test code = 17 mmol/L    23-31        L            



             1357821575)                                         

 

             AGAP (test code = 14           2-16                      



             7889642586)                                         

 

             BUN (test code = 11 mg/dL     7-23                      



             4347276767)                                         

 

             GLUCOSE (test code = 346 mg/dL           H            



             3441645066)                                         

 

             CREATININE (test code = 0.54 mg/dL   0.60-1.25    L            



             3264585199)                                         

 

             CALCIUM (test code = 8.5 mg/dL    8.6-10.6     L            



             8362361442)                                         

 

             eGFR (test code = 171.2        mL/min/1.73m2              



             1431019089)                                         

 

             MAL (test code = MAL) Association of                           



                          Glomerular Filtration                           



                          Rate (GFR) and Staging                           



                          of Kidney Disease*                           



                          +---------------------                           



                          --+-------------------                           



                          --+-------------------                           



                          ------+| GFR                           



                          (mL/min/1.73 m2) ?|                           



                          With Kidney Damage ?|                           



                          ?Without Kidney                           



                          Damage+---------------                           



                          --------+-------------                           



                          --------+-------------                           



                          ------------+| ?>90 ?                           



                          ? ? ? ? ? ? ? ?|                           



                          ?Stage one ? ? ? ? ?|                           



                          ? Normal ? ? ? ? ? ? ?                           



                          ?+--------------------                           



                          ---+------------------                           



                          ---+------------------                           



                          -------+| ?60-89 ? ? ?                           



                          ? ? ? ? ?| ?Stage two                           



                          ? ? ? ? ?| ? Decreased                           



                          GFR ? ? ? ?                            



                          +---------------------                           



                          --+-------------------                           



                          --+-------------------                           



                          ------+| ?30-59 ? ? ?                           



                          ? ? ? ? ?| ?Stage                           



                          three ? ? ? ?| ? Stage                           



                          three ? ? ? ? ?                           



                          +---------------------                           



                          --+-------------------                           



                          --+-------------------                           



                          ------+| ?15-29 ? ? ?                           



                          ? ? ? ? ?| ?Stage four                           



                          ? ? ? ? | ? Stage four                           



                          ? ? ? ? ?                              



                          ?+--------------------                           



                          ---+------------------                           



                          ---+------------------                           



                          -------+| ?<15 (or                           



                          dialysis) ? ?| ?Stage                           



                          five ? ? ? ? | ? Stage                           



                          five ? ? ? ? ?                           



                          ?+--------------------                           



                          ---+------------------                           



                          ---+------------------                           



                          -------+ *Each stage                           



                          assumes the associated                           



                          GFR level has been in                           



                          effect for at least                           



                          three months. ?Stages                           



                          1 to 5, with or                           



                          without kidney                           



                          disease, indicate                           



                          chronic kidney                           



                          disease. Notes:                           



                          Determination of                           



                          stages one and two                           



                          (with eGFR                             



                          >59mL/min/1.73 m2)                           



                          requires estimation of                           



                          kidney damage for at                           



                          least three months as                           



                          defined by structural                           



                          or functional                           



                          abnormalities of the                           



                          kidney, manifested by                           



                          either:Pathological                           



                          abnormalities or                           



                          Markers of kidney                           



                          damage (including                           



                          abnormalities in the                           



                          composition of the                           



                          blood or urine or                           



                          abnormalities in                           



                          imaging tests).                           

 

             Lab Interpretation Abnormal                               



             (test code = 44316-3)                                        



[facility] METABOLIC PANEL (NA, K, CL, CO2, 
GLUCOSE, BUN, CREATININE, CA)2023 06:26:05





             Test Item    Value        Reference Range Interpretation Comments

 

             NA (test code = 132 mmol/L   135-145      L            



             8277861992)                                         

 

             K (test code = 4.4 mmol/L   3.5-5.0                   



             5719330786)                                         

 

             CL (test code = 101 mmol/L                       



             2268974007)                                         

 

             CO2 TOTAL (test code = 17 mmol/L    23-31        L            



             4369109485)                                         

 

             AGAP (test code = 14           2-16                      



             5483324096)                                         

 

             BUN (test code = 11 mg/dL     7-23                      



             8484662922)                                         

 

             GLUCOSE (test code = 346 mg/dL           H            



             5366485550)                                         

 

             CREATININE (test code = 0.54 mg/dL   0.60-1.25    L            



             [id number])                                         

 

             CALCIUM (test code = 8.5 mg/dL    8.6-10.6     L            



             2898604887)                                         

 

             eGFR (test code = 171.2        mL/min/1.73m2              



             1817275393)                                         

 

             MAL (test code = MAL) Association of                           



                          Glomerular Filtration                           



                          Rate (GFR) and Staging                           



                          of Kidney Disease*                           



                          +---------------------                           



                          --+-------------------                           



                          --+-------------------                           



                          ------+| GFR                           



                          (mL/min/1.73 m2) ?|                           



                          With Kidney Damage ?|                           



                          ?Without Kidney                           



                          Damage+---------------                           



                          --------+-------------                           



                          --------+-------------                           



                          ------------+| ?>90 ?                           



                          ? ? ? ? ? ? ? ?|                           



                          ?Stage one ? ? ? ? ?|                           



                          ? Normal ? ? ? ? ? ? ?                           



                          ?+--------------------                           



                          ---+------------------                           



                          ---+------------------                           



                          -------+| ?60-89 ? ? ?                           



                          ? ? ? ? ?| ?Stage two                           



                          ? ? ? ? ?| ? Decreased                           



                          GFR ? ? ? ?                            



                          +---------------------                           



                          --+-------------------                           



                          --+-------------------                           



                          ------+| ?30-59 ? ? ?                           



                          ? ? ? ? ?| ?Stage                           



                          three ? ? ? ?| ? Stage                           



                          three ? ? ? ? ?                           



                          +---------------------                           



                          --+-------------------                           



                          --+-------------------                           



                          ------+| ?15-29 ? ? ?                           



                          ? ? ? ? ?| ?Stage four                           



                          ? ? ? ? | ? Stage four                           



                          ? ? ? ? ?                              



                          ?+--------------------                           



                          ---+------------------                           



                          ---+------------------                           



                          -------+| ?<15 (or                           



                          dialysis) ? ?| ?Stage                           



                          five ? ? ? ? | ? Stage                           



                          five ? ? ? ? ?                           



                          ?+--------------------                           



                          ---+------------------                           



                          ---+------------------                           



                          -------+ *Each stage                           



                          assumes the associated                           



                          GFR level has been in                           



                          effect for at least                           



                          three months. ?Stages                           



                          1 to 5, with or                           



                          without kidney                           



                          disease, indicate                           



                          chronic kidney                           



                          disease. Notes:                           



                          Determination of                           



                          stages one and two                           



                          (with eGFR                             



                          >59mL/min/1.73 m2)                           



                          requires estimation of                           



                          kidney damage for at                           



                          least three months as                           



                          defined by structural                           



                          or functional                           



                          abnormalities of the                           



                          kidney, manifested by                           



                          either:Pathological                           



                          abnormalities or                           



                          Markers of kidney                           



                          damage (including                           



                          abnormalities in the                           



                          composition of the                           



                          blood or urine or                           



                          abnormalities in                           



                          imaging tests).                           

 

             Lab Interpretation Abnormal                               



             (test code = 94860-9)                                        



[facility] GLUCOSE (AUTOMATED)2023 05:38:22





             Test Item    Value        Reference Range Interpretation Comments

 

             POCT GLU (test code = 7123774255) 414 mg/dL           H      

      

 

             Lab Interpretation (test code = Abnormal                           

    



             28944-9)                                            



[facility] GLUCOSE (AUTOMATED)2023 05:38:22





             Test Item    Value        Reference Range Interpretation Comments

 

             POCT GLU (test code = 3443419019) 414 mg/dL           H      

      

 

             Lab Interpretation (test code = Abnormal                           

    



             27565-7)                                            



[facility] METABOLIC PANEL (NA, K, CL, CO2, 
GLUCOSE, BUN, CREATININE, CA)2023 03:58:07





             Test Item    Value        Reference Range Interpretation Comments

 

             NA (test code = 133 mmol/L   135-145      L            



             0456780228)                                         

 

             K (test code = 4.7 mmol/L   3.5-5.0                   



             8183445851)                                         

 

             CL (test code = 100 mmol/L                       



             4508406866)                                         

 

             CO2 TOTAL (test code = 16 mmol/L    23-31        L            



             0093094845)                                         

 

             AGAP (test code = 17           2-16         H            



             2938615391)                                         

 

             BUN (test code = 12 mg/dL     7-23                      



             1455124341)                                         

 

             GLUCOSE (test code = 407 mg/dL           H            



             0890198623)                                         

 

             CREATININE (test code = 0.55 mg/dL   0.60-1.25    L            



             3592740915)                                         

 

             CALCIUM (test code = 9.2 mg/dL    8.6-10.6                  



             2797362252)                                         

 

             eGFR (test code = 167.6        mL/min/1.73m2              



             7203884176)                                         

 

             MAL (test code = MAL) Association of                           



                          Glomerular Filtration                           



                          Rate (GFR) and Staging                           



                          of Kidney Disease*                           



                          +---------------------                           



                          --+-------------------                           



                          --+-------------------                           



                          ------+| GFR                           



                          (mL/min/1.73 m2) ?|                           



                          With Kidney Damage ?|                           



                          ?Without Kidney                           



                          Damage+---------------                           



                          --------+-------------                           



                          --------+-------------                           



                          ------------+| ?>90 ?                           



                          ? ? ? ? ? ? ? ?|                           



                          ?Stage one ? ? ? ? ?|                           



                          ? Normal ? ? ? ? ? ? ?                           



                          ?+--------------------                           



                          ---+------------------                           



                          ---+------------------                           



                          -------+| ?60-89 ? ? ?                           



                          ? ? ? ? ?| ?Stage two                           



                          ? ? ? ? ?| ? Decreased                           



                          GFR ? ? ? ?                            



                          +---------------------                           



                          --+-------------------                           



                          --+-------------------                           



                          ------+| ?30-59 ? ? ?                           



                          ? ? ? ? ?| ?Stage                           



                          three ? ? ? ?| ? Stage                           



                          three ? ? ? ? ?                           



                          +---------------------                           



                          --+-------------------                           



                          --+-------------------                           



                          ------+| ?15-29 ? ? ?                           



                          ? ? ? ? ?| ?Stage four                           



                          ? ? ? ? | ? Stage four                           



                          ? ? ? ? ?                              



                          ?+--------------------                           



                          ---+------------------                           



                          ---+------------------                           



                          -------+| ?<15 (or                           



                          dialysis) ? ?| ?Stage                           



                          five ? ? ? ? | ? Stage                           



                          five ? ? ? ? ?                           



                          ?+--------------------                           



                          ---+------------------                           



                          ---+------------------                           



                          -------+ *Each stage                           



                          assumes the associated                           



                          GFR level has been in                           



                          effect for at least                           



                          three months. ?Stages                           



                          1 to 5, with or                           



                          without kidney                           



                          disease, indicate                           



                          chronic kidney                           



                          disease. Notes:                           



                          Determination of                           



                          stages one and two                           



                          (with eGFR                             



                          >59mL/min/1.73 m2)                           



                          requires estimation of                           



                          kidney damage for at                           



                          least three months as                           



                          defined by structural                           



                          or functional                           



                          abnormalities of the                           



                          kidney, manifested by                           



                          either:Pathological                           



                          abnormalities or                           



                          Markers of kidney                           



                          damage (including                           



                          abnormalities in the                           



                          composition of the                           



                          blood or urine or                           



                          abnormalities in                           



                          imaging tests).                           

 

             Lab Interpretation Abnormal                               



             (test code = 43395-4)                                        



[facility] METABOLIC PANEL (NA, K, CL, CO2, 
GLUCOSE, BUN, CREATININE, CA)2023 03:58:07





             Test Item    Value        Reference Range Interpretation Comments

 

             NA (test code = 133 mmol/L   135-145      L            



             8223472131)                                         

 

             K (test code = 4.7 mmol/L   3.5-5.0                   



             1908842358)                                         

 

             CL (test code = 100 mmol/L                       



             7055058656)                                         

 

             CO2 TOTAL (test code = 16 mmol/L    23-31        L            



             5072075809)                                         

 

             AGAP (test code = 17           2-16         H            



             7944475755)                                         

 

             BUN (test code = 12 mg/dL     7-23                      



             7681381636)                                         

 

             GLUCOSE (test code = 407 mg/dL           H            



             6838669235)                                         

 

             CREATININE (test code = 0.55 mg/dL   0.60-1.25    L            



             3369892240)                                         

 

             CALCIUM (test code = 9.2 mg/dL    8.6-10.6                  



             2045245512)                                         

 

             eGFR (test code = 167.6        mL/min/1.73m2              



             7737381912)                                         

 

             MAL (test code = MAL) Association of                           



                          Glomerular Filtration                           



                          Rate (GFR) and Staging                           



                          of Kidney Disease*                           



                          +---------------------                           



                          --+-------------------                           



                          --+-------------------                           



                          ------+| GFR                           



                          (mL/min/1.73 m2) ?|                           



                          With Kidney Damage ?|                           



                          ?Without Kidney                           



                          Damage+---------------                           



                          --------+-------------                           



                          --------+-------------                           



                          ------------+| ?>90 ?                           



                          ? ? ? ? ? ? ? ?|                           



                          ?Stage one ? ? ? ? ?|                           



                          ? Normal ? ? ? ? ? ? ?                           



                          ?+--------------------                           



                          ---+------------------                           



                          ---+------------------                           



                          -------+| ?60-89 ? ? ?                           



                          ? ? ? ? ?| ?Stage two                           



                          ? ? ? ? ?| ? Decreased                           



                          GFR ? ? ? ?                            



                          +---------------------                           



                          --+-------------------                           



                          --+-------------------                           



                          ------+| ?30-59 ? ? ?                           



                          ? ? ? ? ?| ?Stage                           



                          three ? ? ? ?| ? Stage                           



                          three ? ? ? ? ?                           



                          +---------------------                           



                          --+-------------------                           



                          --+-------------------                           



                          ------+| ?15-29 ? ? ?                           



                          ? ? ? ? ?| ?Stage four                           



                          ? ? ? ? | ? Stage four                           



                          ? ? ? ? ?                              



                          ?+--------------------                           



                          ---+------------------                           



                          ---+------------------                           



                          -------+| ?<15 (or                           



                          dialysis) ? ?| ?Stage                           



                          five ? ? ? ? | ? Stage                           



                          five ? ? ? ? ?                           



                          ?+--------------------                           



                          ---+------------------                           



                          ---+------------------                           



                          -------+ *Each stage                           



                          assumes the associated                           



                          GFR level has been in                           



                          effect for at least                           



                          three months. ?Stages                           



                          1 to 5, with or                           



                          without kidney                           



                          disease, indicate                           



                          chronic kidney                           



                          disease. Notes:                           



                          Determination of                           



                          stages one and two                           



                          (with eGFR                             



                          >59mL/min/1.73 m2)                           



                          requires estimation of                           



                          kidney damage for at                           



                          least three months as                           



                          defined by structural                           



                          or functional                           



                          abnormalities of the                           



                          kidney, manifested by                           



                          either:Pathological                           



                          abnormalities or                           



                          Markers of kidney                           



                          damage (including                           



                          abnormalities in the                           



                          composition of the                           



                          blood or urine or                           



                          abnormalities in                           



                          imaging tests).                           

 

             Lab Interpretation Abnormal                               



             (test code = 84915-7)                                        



[facility] WITH [id number] 03:21:04





             Test Item    Value        Reference Range Interpretation Comments

 

             WBC (test code = 12.21        See_Comment  H             [Automated



             6190-2)                                             message] The sy

stem



                                                                 which generated



                                                                 this result



                                                                 transmitted



                                                                 reference range

:



                                                                 4.20 - 10.70



                                                                 10*3/?L. The



                                                                 reference range

 was



                                                                 not used to



                                                                 interpret this



                                                                 result as



                                                                 normal/abnormal

.

 

             RBC (test code = 5.59         See_Comment  H             [Automated



             809-8)                                              message] The sy

stem



                                                                 which generated



                                                                 this result



                                                                 transmitted



                                                                 reference range

:



                                                                 4.26 - 5.52



                                                                 10*6/?L. The



                                                                 reference range

 was



                                                                 not used to



                                                                 interpret this



                                                                 result as



                                                                 normal/abnormal

.

 

             HGB (test code = 16.3 g/dL    12.2-16.4                 



             718-7)                                              

 

             HCT (test code = 47.2 %       38.4-49.3                 



             4544-3)                                             

 

             MCV (test code = 84.4 fL      81.7-95.6                 



             787-2)                                              

 

             MCH (test code = 29.2 pg      26.1-32.7                 



             785-6)                                              

 

             MCHC (test code = 34.5 g/dL    31.2-35.0                 



             786-4)                                              

 

             RDW-SD (test code = 45.5 fL      38.5-51.6                 



             69703-7)                                            

 

             RDW-CV (test code = 15.1 %       12.1-15.4                 



             788-0)                                              

 

             PLT (test code = 344          See_Comment  H             [Automated



             777-3)                                              message] The sy

stem



                                                                 which generated



                                                                 this result



                                                                 transmitted



                                                                 reference range

:



                                                                 150 - 328 10*3/

?L.



                                                                 The reference r

zhen



                                                                 was not used to



                                                                 interpret this



                                                                 result as



                                                                 normal/abnormal

.

 

             MPV (test code = 10.5 fL      9.8-13.0                  



             77695-4)                                            

 

             NRBC/100 WBC (test 0.0          See_Comment                [Automat

ed



             code = [id number])                                        message] 

The system



                                                                 which generated



                                                                 this result



                                                                 transmitted



                                                                 reference range

:



                                                                 0.0 - 10.0 /100



                                                                 WBCs. The refer

ence



                                                                 range was not u

sed



                                                                 to interpret th

is



                                                                 result as



                                                                 normal/abnormal

.

 

             NRBC x10^3 (test code              See_Comment                [Auto

mated



             = 7669112100)                                        message] The s

ystem



                                                                 which generated



                                                                 this result



                                                                 transmitted



                                                                 reference range

:



                                                                 10*3/?L. The



                                                                 reference range

 was



                                                                 not used to



                                                                 interpret this



                                                                 result as



                                                                 normal/abnormal

.

 

             GRAN MAT (NEUT) % 73.0 %                                 



             (test code = 770-8)                                        

 

             IMM GRAN % (test code 0.70 %                                 



             = 1002477883)                                        

 

             LYMPH % (test code = 19.6 %                                 



             736-9)                                              

 

             MONO % (test code = 6.1 %                                  



             5905-5)                                             

 

             EOS % (test code = 0.3 %                                  



             713-8)                                              

 

             BASO % (test code = 0.3 %                                  



             706-2)                                              

 

             GRAN MAT x10^3(ANC) 8.91 10*3/uL 1.99-6.95    H            



             (test code =                                        



             8579250093)                                         

 

             IMM GRAN x10^3 (test 0.09 10*3/uL 0.00-0.06    H            



             code = 8137829978)                                        

 

             LYMPH x10^3 (test code 2.39 10*3/uL 1.09-3.23                 



             = 731-0)                                            

 

             MONO x10^3 (test code 0.74 10*3/uL 0.36-1.02                 



             = 742-7)                                            

 

             EOS x10^3 (test code = 0.04 10*3/uL 0.06-0.53    L            



             711-2)                                              

 

             BASO x10^3 (test code 0.04 10*3/uL 0.01-0.09                 



             = 704-7)                                            

 

             Lab Interpretation Abnormal                               



             (test code = 62935-6)                                        



[facility] WITH [id number] 03:21:04





             Test Item    Value        Reference Range Interpretation Comments

 

             WBC (test code = 12.21        See_Comment  H             [Automated



             6690-2)                                             message] The sy

stem



                                                                 which generated



                                                                 this result



                                                                 transmitted



                                                                 reference range

:



                                                                 4.20 - 10.70



                                                                 10*3/?L. The



                                                                 reference range

 was



                                                                 not used to



                                                                 interpret this



                                                                 result as



                                                                 normal/abnormal

.

 

             RBC (test code = 5.59         See_Comment  H             [Automated



             789-8)                                              message] The sy

stem



                                                                 which generated



                                                                 this result



                                                                 transmitted



                                                                 reference range

:



                                                                 4.26 - 5.52



                                                                 10*6/?L. The



                                                                 reference range

 was



                                                                 not used to



                                                                 interpret this



                                                                 result as



                                                                 normal/abnormal

.

 

             HGB (test code = 16.3 g/dL    12.2-16.4                 



             718-7)                                              

 

             HCT (test code = 47.2 %       38.4-49.3                 



             4544-3)                                             

 

             MCV (test code = 84.4 fL      81.7-95.6                 



             787-2)                                              

 

             MCH (test code = 29.2 pg      26.1-32.7                 



             785-6)                                              

 

             MCHC (test code = 34.5 g/dL    31.2-35.0                 



             786-4)                                              

 

             RDW-SD (test code = 45.5 fL      38.5-51.6                 



             33856-8)                                            

 

             RDW-CV (test code = 15.1 %       12.1-15.4                 



             788-0)                                              

 

             PLT (test code = 344          See_Comment  H             [Automated



             777-3)                                              message] The sy

stem



                                                                 which generated



                                                                 this result



                                                                 transmitted



                                                                 reference range

:



                                                                 150 - 328 10*3/

?L.



                                                                 The reference r

zhen



                                                                 was not used to



                                                                 interpret this



                                                                 result as



                                                                 normal/abnormal

.

 

             MPV (test code = 10.5 fL      9.8-13.0                  



             23176-4)                                            

 

             NRBC/100 WBC (test 0.0          See_Comment                [Automat

ed



             code = 6314181975)                                        message] 

The system



                                                                 which generated



                                                                 this result



                                                                 transmitted



                                                                 reference range

:



                                                                 0.0 - 10.0 /100



                                                                 WBCs. The refer

ence



                                                                 range was not u

sed



                                                                 to interpret th

is



                                                                 result as



                                                                 normal/abnormal

.

 

             NRBC x10^3 (test code              See_Comment                [Auto

mated



             = [id number])                                        message] The s

ystem



                                                                 which generated



                                                                 this result



                                                                 transmitted



                                                                 reference range

:



                                                                 10*3/?L. The



                                                                 reference range

 was



                                                                 not used to



                                                                 interpret this



                                                                 result as



                                                                 normal/abnormal

.

 

             GRAN MAT (NEUT) % 73.0 %                                 



             (test code = 770-8)                                        

 

             IMM GRAN % (test code 0.70 %                                 



             = 5594421273)                                        

 

             LYMPH % (test code = 19.6 %                                 



             736-9)                                              

 

             MONO % (test code = 6.1 %                                  



             5905-5)                                             

 

             EOS % (test code = 0.3 %                                  



             713-8)                                              

 

             BASO % (test code = 0.3 %                                  



             706-2)                                              

 

             GRAN MAT x10^3(ANC) 8.91 10*3/uL 1.99-6.95    H            



             (test code =                                        



             8885997373)                                         

 

             IMM GRAN x10^3 (test 0.09 10*3/uL 0.00-0.06    H            



             code = 7828319292)                                        

 

             LYMPH x10^3 (test code 2.39 10*3/uL 1.09-3.23                 



             = 731-0)                                            

 

             MONO x10^3 (test code 0.74 10*3/uL 0.36-1.02                 



             = 742-7)                                            

 

             EOS x10^3 (test code = 0.04 10*3/uL 0.06-0.53    L            



             711-2)                                              

 

             BASO x10^3 (test code 0.04 10*3/uL 0.01-0.09                 



             = 704-7)                                            

 

             Lab Interpretation Abnormal                               



             (test code = 90254-4)                                        



[facility]





             Test Item    Value        Reference Range Interpretation Comments

 

             POCT GLU (test code = 9608749216) 438 mg/dL           H      

      

 

             Lab Interpretation (test code = Abnormal                           

    



             43314-9)                                            



[facility]. METABOLIC PANEL (30309)2023 
01:00:03





             Test Item    Value        Reference Range Interpretation Comments

 

             NA (test code = 136 mmol/L   135-145                   



             2267046371)                                         

 

             K (test code = 3.8 mmol/L   3.5-5.0                   



             7771626226)                                         

 

             CL (test code = 104 mmol/L                       



             5509174120)                                         

 

             CO2 TOTAL (test code = 23 mmol/L    23-31                     



             6408200336)                                         

 

             AGAP (test code = 9            2-16                      



             1257545059)                                         

 

             BUN (test code = 6 mg/dL      7-23         L            



             2744580284)                                         

 

             GLUCOSE (test code = 645 mg/dL           HH           



             7258627790)                                         

 

             CREATININE (test code = 0.68 mg/dL   0.60-1.25                 



             9580031874)                                         

 

             TOTAL BILI (test code = 1.0 mg/dL    0.1-1.1                   



             9693151100)                                         

 

             CALCIUM (test code = 8.5 mg/dL    8.6-10.6     L            



             1916184407)                                         

 

             T PROTEIN (test code = 6.8 g/dL     6.3-8.2                   



             4023809747)                                         

 

             ALBUMIN (test code = 3.5 g/dL     3.5-5.0                   



             9428144892)                                         

 

             ALK PHOS (test code = 201 U/L             H            



             4657946147)                                         

 

             ALTv (test code = 60 U/L       5-50         H            



             1742-6)                                             

 

             AST(SGOT) (test code = 23 U/L       13-40                     



             5007940816)                                         

 

             eGFR (test code = 131.2        mL/min/1.73m2              



             0934075261)                                         

 

             MAL (test code = MAL) Association of                           



                          Glomerular Filtration                           



                          Rate (GFR) and Staging                           



                          of Kidney Disease*                           



                          +---------------------                           



                          --+-------------------                           



                          --+-------------------                           



                          ------+| GFR                           



                          (mL/min/1.73 m2) ?|                           



                          With Kidney Damage ?|                           



                          ?Without Kidney                           



                          Damage+---------------                           



                          --------+-------------                           



                          --------+-------------                           



                          ------------+| ?>90 ?                           



                          ? ? ? ? ? ? ? ?|                           



                          ?Stage one ? ? ? ? ?|                           



                          ? Normal ? ? ? ? ? ? ?                           



                          ?+--------------------                           



                          ---+------------------                           



                          ---+------------------                           



                          -------+| ?60-89 ? ? ?                           



                          ? ? ? ? ?| ?Stage two                           



                          ? ? ? ? ?| ? Decreased                           



                          GFR ? ? ? ?                            



                          +---------------------                           



                          --+-------------------                           



                          --+-------------------                           



                          ------+| ?30-59 ? ? ?                           



                          ? ? ? ? ?| ?Stage                           



                          three ? ? ? ?| ? Stage                           



                          three ? ? ? ? ?                           



                          +---------------------                           



                          --+-------------------                           



                          --+-------------------                           



                          ------+| ?15-29 ? ? ?                           



                          ? ? ? ? ?| ?Stage four                           



                          ? ? ? ? | ? Stage four                           



                          ? ? ? ? ?                              



                          ?+--------------------                           



                          ---+------------------                           



                          ---+------------------                           



                          -------+| ?<15 (or                           



                          dialysis) ? ?| ?Stage                           



                          five ? ? ? ? | ? Stage                           



                          five ? ? ? ? ?                           



                          ?+--------------------                           



                          ---+------------------                           



                          ---+------------------                           



                          -------+ *Each stage                           



                          assumes the associated                           



                          GFR level has been in                           



                          effect for at least                           



                          three months. ?Stages                           



                          1 to 5, with or                           



                          without kidney                           



                          disease, indicate                           



                          chronic kidney                           



                          disease. Notes:                           



                          Determination of                           



                          stages one and two                           



                          (with eGFR                             



                          >59mL/min/1.73 m2)                           



                          requires estimation of                           



                          kidney damage for at                           



                          least three months as                           



                          defined by structural                           



                          or functional                           



                          abnormalities of the                           



                          kidney, manifested by                           



                          either:Pathological                           



                          abnormalities or                           



                          Markers of kidney                           



                          damage (including                           



                          abnormalities in the                           



                          composition of the                           



                          blood or urine or                           



                          abnormalities in                           



                          imaging tests).                           

 

             Lab Interpretation Abnormal                               



             (test code = 59696-9)                                        



[facility]





             Test Item    Value        Reference Range Interpretation Comments

 

             LIPASE (test code = 5877651320) 141 U/L      0-220                 

    

 

             Lab Interpretation (test code = Normal                             

    



             95678-5)                                            



[facility] WITH [id number] 00:01:30





             Test Item    Value        Reference Range Interpretation Comments

 

             WBC (test code = 8.06         See_Comment                [Automated



             6690-2)                                             message] The sy

stem



                                                                 which generated



                                                                 this result



                                                                 transmitted



                                                                 reference range

:



                                                                 4.20 - 10.70



                                                                 10*3/?L. The



                                                                 reference range

 was



                                                                 not used to



                                                                 interpret this



                                                                 result as



                                                                 normal/abnormal

.

 

             RBC (test code = 5.04         See_Comment                [Automated



             789-8)                                              message] The sy

stem



                                                                 which generated



                                                                 this result



                                                                 transmitted



                                                                 reference range

:



                                                                 4.26 - 5.52



                                                                 10*6/?L. The



                                                                 reference range

 was



                                                                 not used to



                                                                 interpret this



                                                                 result as



                                                                 normal/abnormal

.

 

             HGB (test code = 14.4 g/dL    12.2-16.4                 



             718-7)                                              

 

             HCT (test code = 43.1 %       38.4-49.3                 



             4544-3)                                             

 

             MCV (test code = 85.5 fL      81.7-95.6                 



             787-2)                                              

 

             MCH (test code = 28.6 pg      26.1-32.7                 



             785-6)                                              

 

             MCHC (test code = 33.4 g/dL    31.2-35.0                 



             786-4)                                              

 

             RDW-SD (test code = 46.7 fL      38.5-51.6                 



             08446-7)                                            

 

             RDW-CV (test code = 15.5 %       12.1-15.4    H            



             788-0)                                              

 

             PLT (test code = 386          See_Comment  H             [Automated



             777-3)                                              message] The sy

stem



                                                                 which generated



                                                                 this result



                                                                 transmitted



                                                                 reference range

:



                                                                 150 - 328 10*3/

?L.



                                                                 The reference r

zhen



                                                                 was not used to



                                                                 interpret this



                                                                 result as



                                                                 normal/abnormal

.

 

             MPV (test code = 10.7 fL      9.8-13.0                  



             11319-7)                                            

 

             NRBC/100 WBC (test 0.0          See_Comment                [Automat

ed



             code = 0683179943)                                        message] 

The system



                                                                 which generated



                                                                 this result



                                                                 transmitted



                                                                 reference range

:



                                                                 0.0 - 10.0 /100



                                                                 WBCs. The refer

ence



                                                                 range was not u

sed



                                                                 to interpret th

is



                                                                 result as



                                                                 normal/abnormal

.

 

             NRBC x10^3 (test code              See_Comment                [Auto

mated



             = [id number])                                        message] The s

ystem



                                                                 which generated



                                                                 this result



                                                                 transmitted



                                                                 reference range

:



                                                                 10*3/?L. The



                                                                 reference range

 was



                                                                 not used to



                                                                 interpret this



                                                                 result as



                                                                 normal/abnormal

.

 

             GRAN MAT (NEUT) % 72.5 %                                 



             (test code = 770-8)                                        

 

             IMM GRAN % (test code 0.40 %                                 



             = 9299871090)                                        

 

             LYMPH % (test code = 15.0 %                                 



             736-9)                                              

 

             MONO % (test code = 9.1 %                                  



             5905-5)                                             

 

             EOS % (test code = 2.5 %                                  



             713-8)                                              

 

             BASO % (test code = 0.5 %                                  



             706-2)                                              

 

             GRAN MAT x10^3(ANC) 5.85 10*3/uL 1.99-6.95                 



             (test code =                                        



             9877043127)                                         

 

             IMM GRAN x10^3 (test 0.03 10*3/uL 0.00-0.06                 



             code = 2427326349)                                        

 

             LYMPH x10^3 (test code 1.21 10*3/uL 1.09-3.23                 



             = 731-0)                                            

 

             MONO x10^3 (test code 0.73 10*3/uL 0.36-1.02                 



             = 742-7)                                            

 

             EOS x10^3 (test code = 0.20 10*3/uL 0.06-0.53                 



             711-2)                                              

 

             BASO x10^3 (test code 0.04 10*3/uL 0.01-0.09                 



             = 704-7)                                            

 

             Lab Interpretation Abnormal                               



             (test code = 75935-1)                                        



[facility]09:42





             Test Item    Value        Reference Range Interpretation Comments

 

             POCT GLU (test code = 6988409549) 367 mg/dL           H      

      

 

             Lab Interpretation (test code = Abnormal                           

    



             18000-6)                                            



[facility] (58780[facility] 
22:58:25





             Test Item    Value        Reference Range Interpretation Comments

 

             NA (test code = 143 mmol/L   135-145                   



             9527204902)                                         

 

             K (test code = 4.2 mmol/L   3.5-5.0                   



             8748029344)                                         

 

             CL (test code = 106 mmol/L                       



             3727865858)                                         

 

             CO2 TOTAL (test code = 18 mmol/L    23-31        L            



             6936761658)                                         

 

             AGAP (test code = 19           2-16         H            



             8110859575)                                         

 

             BUN (test code = 7 mg/dL      7-23                      



             5972039623)                                         

 

             GLUCOSE (test code = 542 mg/dL           HH           



             6052397002)                                         

 

             CREATININE (test code = 0.67 mg/dL   0.60-1.25                 



             3819384680)                                         

 

             TOTAL BILI (test code = 1.5 mg/dL    0.1-1.1      H            



             7280853305)                                         

 

             CALCIUM (test code = 9.5 mg/dL    8.6-10.6                  



             0247489777)                                         

 

             T PROTEIN (test code = 7.9 g/dL     6.3-8.2                   



             8711708532)                                         

 

             ALBUMIN (test code = 4.2 g/dL     3.5-5.0                   



             8260161053)                                         

 

             ALK PHOS (test code = 233 U/L             H            



             1695096178)                                         

 

             ALTv (test code = 86 U/L       5-50         H            



             1742-6)                                             

 

             AST(SGOT) (test code = 25 U/L       13-40                     



             5789056590)                                         

 

             eGFR (test code = 133.5        mL/min/1.73m2              



             1878148971)                                         

 

             MAL (test code = MAL) Association of                           



                          Glomerular Filtration                           



                          Rate (GFR) and Staging                           



                          of Kidney Disease*                           



                          +---------------------                           



                          --+-------------------                           



                          --+-------------------                           



                          ------+| GFR                           



                          (mL/min/1.73 m2) ?|                           



                          With Kidney Damage ?|                           



                          ?Without Kidney                           



                          Damage+---------------                           



                          --------+-------------                           



                          --------+-------------                           



                          ------------+| ?>90 ?                           



                          ? ? ? ? ? ? ? ?|                           



                          ?Stage one ? ? ? ? ?|                           



                          ? Normal ? ? ? ? ? ? ?                           



                          ?+--------------------                           



                          ---+------------------                           



                          ---+------------------                           



                          -------+| ?60-89 ? ? ?                           



                          ? ? ? ? ?| ?Stage two                           



                          ? ? ? ? ?| ? Decreased                           



                          GFR ? ? ? ?                            



                          +---------------------                           



                          --+-------------------                           



                          --+-------------------                           



                          ------+| ?30-59 ? ? ?                           



                          ? ? ? ? ?| ?Stage                           



                          three ? ? ? ?| ? Stage                           



                          three ? ? ? ? ?                           



                          +---------------------                           



                          --+-------------------                           



                          --+-------------------                           



                          ------+| ?15-29 ? ? ?                           



                          ? ? ? ? ?| ?Stage four                           



                          ? ? ? ? | ? Stage four                           



                          ? ? ? ? ?                              



                          ?+--------------------                           



                          ---+------------------                           



                          ---+------------------                           



                          -------+| ?<15 (or                           



                          dialysis) ? ?| ?Stage                           



                          five ? ? ? ? | ? Stage                           



                          five ? ? ? ? ?                           



                          ?+--------------------                           



                          ---+------------------                           



                          ---+------------------                           



                          -------+ *Each stage                           



                          assumes the associated                           



                          GFR level has been in                           



                          effect for at least                           



                          three months. ?Stages                           



                          1 to 5, with or                           



                          without kidney                           



                          disease, indicate                           



                          chronic kidney                           



                          disease. Notes:                           



                          Determination of                           



                          stages one and two                           



                          (with eGFR                             



                          >59mL/min/1.73 m2)                           



                          requires estimation of                           



                          kidney damage for at                           



                          least three months as                           



                          defined by structural                           



                          or functional                           



                          abnormalities of the                           



                          kidney, manifested by                           



                          either:Pathological                           



                          abnormalities or                           



                          Markers of kidney                           



                          damage (including                           



                          abnormalities in the                           



                          composition of the                           



                          blood or urine or                           



                          abnormalities in                           



                          imaging tests).                           

 

             Lab Interpretation Abnormal                               



             (test code = 27943-1)                                        



[facility]55[facility]17





             Test Item    Value        Reference Range Interpretation Comments

 

             MAGNESIUM (test code = 6697353002) 1.7 mg/dL    1.7-2.4            

       

 

             Lab Interpretation (test code = Normal                             

    



             59758-1)                                            



[id number] 22:28:16





             Test Item    Value        Reference Range Interpretation Comments

 

             WBC (test code = 8.36         See_Comment                [Automated



             6690-2)                                             message] The sy

stem



                                                                 which generated



                                                                 this result



                                                                 transmitted



                                                                 reference range

:



                                                                 4.20 - 10.70



                                                                 10*3/?L. The



                                                                 reference range

 was



                                                                 not used to



                                                                 interpret this



                                                                 result as



                                                                 normal/abnormal

.

 

             RBC (test code = 5.74         See_Comment  H             [Automated



             789-8)                                              message] The sy

stem



                                                                 which generated



                                                                 this result



                                                                 transmitted



                                                                 reference range

:



                                                                 4.26 - 5.52



                                                                 10*6/?L. The



                                                                 reference range

 was



                                                                 not used to



                                                                 interpret this



                                                                 result as



                                                                 normal/abnormal

.

 

             HGB (test code = 16.5 g/dL    12.2-16.4    H            



             718-7)                                              

 

             HCT (test code = 48.5 %       38.4-49.3                 



             4544-3)                                             

 

             MCV (test code = 84.5 fL      81.7-95.6                 



             787-2)                                              

 

             MCH (test code = 28.7 pg      26.1-32.7                 



             785-6)                                              

 

             MCHC (test code = 34.0 g/dL    31.2-35.0                 



             786-4)                                              

 

             RDW-SD (test code = 45.1 fL      38.5-51.6                 



             58973-4)                                            

 

             RDW-CV (test code = 15.8 %       12.1-15.4    H            



             788-0)                                              

 

             PLT (test code = 475          See_Comment  H             [Automated



             777-3)                                              message] The sy

stem



                                                                 which generated



                                                                 this result



                                                                 transmitted



                                                                 reference range

:



                                                                 150 - 328 10*3/

?L.



                                                                 The reference r

zhen



                                                                 was not used to



                                                                 interpret this



                                                                 result as



                                                                 normal/abnormal

.

 

             MPV (test code = 10.7 fL      9.8-13.0                  



             96451-0)                                            

 

             NRBC/100 WBC (test 0.0          See_Comment                [Automat

ed



             code = 3774458529)                                        message] 

The system



                                                                 which generated



                                                                 this result



                                                                 transmitted



                                                                 reference range

:



                                                                 0.0 - 10.0 /100



                                                                 WBCs. The refer

ence



                                                                 range was not u

sed



                                                                 to interpret th

is



                                                                 result as



                                                                 normal/abnormal

.

 

             NRBC x10^3 (test code              See_Comment                [Auto

mated



             = 5807236979)                                        message] The s

ystem



                                                                 which generated



                                                                 this result



                                                                 transmitted



                                                                 reference range

:



                                                                 10*3/?L. The



                                                                 reference range

 was



                                                                 not used to



                                                                 interpret this



                                                                 result as



                                                                 normal/abnormal

.

 

             GRAN MAT (NEUT) % 70.8 %                                 



             (test code = 770-8)                                        

 

             IMM GRAN % (test code 1.00 %                                 



             = 5916422798)                                        

 

             LYMPH % (test code = 18.3 %                                 



             736-9)                                              

 

             MONO % (test code = 7.7 %                                  



             5905-5)                                             

 

             EOS % (test code = 1.8 %                                  



             713-8)                                              

 

             BASO % (test code = 0.4 %                                  



             706-2)                                              

 

             GRAN MAT x10^3(ANC) 5.93 10*3/uL 1.99-6.95                 



             (test code =                                        



             6863230096)                                         

 

             IMM GRAN x10^3 (test 0.08 10*3/uL 0.00-0.06    H            



             code = 1537561182)                                        

 

             LYMPH x10^3 (test code 1.53 10*3/uL 1.09-3.23                 



             = 731-0)                                            

 

             MONO x10^3 (test code 0.64 10*3/uL 0.36-1.02                 



             = 742-7)                                            

 

             EOS x10^3 (test code = 0.15 10*3/uL 0.06-0.53                 



             711-2)                                              

 

             BASO x10^3 (test code 0.03 10*3/uL 0.01-0.09                 



             = 704-7)                                            

 

             Lab Interpretation Abnormal                               



             (test code = 12753-2)                                        



[facility] GLUCOSE (AUTOMATED)2023 22:22:13





             Test Item    Value        Reference Range Interpretation Comments

 

             POCT GLU (test code = 7329406823) 515 mg/dL           HH     

      

 

             Lab Interpretation (test code = Abnormal                           

    



             57895-5)                                            



[facility] GLUCOSE (AUTOMATED)2023 21:18:09





             Test Item    Value        Reference Range Interpretation Comments

 

             POCT GLU (test code = 0662915713) 557 mg/dL           HH     

      

 

             Lab Interpretation (test code = Abnormal                           

    



             93414-7)                                            



[facility] GLUCOSE (AUTOMATED)2023 23:58:14





             Test Item    Value        Reference Range Interpretation Comments

 

             POCT GLU (test code = 8589694397) 235 mg/dL           H      

      

 

             Lab Interpretation (test code = Abnormal                           

    



             86760-5)                                            



[facility] GLUCOSE (AUTOMATED)2023 17:36:06





             Test Item    Value        Reference Range Interpretation Comments

 

             POCT GLU (test code = 8668019257) 276 mg/dL           H      

      

 

             Lab Interpretation (test code = Abnormal                           

    



             44708-9)                                            



[facility] GLUCOSE (AUTOMATED)2023 14:01:35





             Test Item    Value        Reference Range Interpretation Comments

 

             POCT GLU (test code = 1821578087) 306 mg/dL           H      

      

 

             Lab Interpretation (test code = Abnormal                           

    



             13692-0)                                            



[facility] GLUCOSE (AUTOMATED)2023 14:01:35





             Test Item    Value        Reference Range Interpretation Comments

 

             POCT GLU (test code = 2452897485) 321 mg/dL           H      

      

 

             Lab Interpretation (test code = Abnormal                           

    



             30649-4)                                            



[facility] GLUCOSE (AUTOMATED)2023 02:22:30





             Test Item    Value        Reference Range Interpretation Comments

 

             POCT GLU (test code = 2398219165) 323 mg/dL           H      

      

 

             Lab Interpretation (test code = Abnormal                           

    



             25473-9)                                            



[facility] GLUCOSE (AUTOMATED)2023 01:14:46





             Test Item    Value        Reference Range Interpretation Comments

 

             POCT GLU (test code = 9540047616) 311 mg/dL           H      

      

 

             Lab Interpretation (test code = Abnormal                           

    



             57611-7)                                            



[facility] GLUCOSE (AUTOMATED)2023 23:47:24





             Test Item    Value        Reference Range Interpretation Comments

 

             POCT GLU (test code = 7634097615) 354 mg/dL           H      

      

 

             Lab Interpretation (test code = Abnormal                           

    



             68040-1)                                            



[facility] GLUCOSE (AUTOMATED)2023 22:23:59





             Test Item    Value        Reference Range Interpretation Comments

 

             POCT GLU (test code = 0512939990) 398 mg/dL           H      

      

 

             Lab Interpretation (test code = Abnormal                           

    



             80239-9)                                            



[facility] HEMOGLOBIN A1C [id number] 19:04:00





             Test Item    Value        Reference Range Interpretation Comments

 

             POCT HBA1C (test code = 4548-4) 6.8 %        4-6          A        

    

 

             Lab Interpretation (test code = Abnormal                           

    



             12717-1)                                            



[facility] HEMOGLOBIN A1C [id number] 19:04:00





             Test Item    Value        Reference Range Interpretation Comments

 

             POCT HBA1C (test code = 4548-4) 6.8 %        4-6          A        

    

 

             Lab Interpretation (test code = Abnormal                           

    



             15435-5)                                            



[facility] HEMOGLOBIN A1C [id number] 19:04:00





             Test Item    Value        Reference Range Interpretation Comments

 

             POCT HBA1C (test code = 4548-4) 6.8 %        4-6          A        

    

 

             Lab Interpretation (test code = Abnormal                           

    



             80222-8)                                            



[facility] GLUCOSE (AUTOMATED)2022 23:04:10





             Test Item    Value        Reference Range Interpretation Comments

 

             POCT GLU (test code = 1415403928) 142 mg/dL           H      

      

 

             Lab Interpretation (test code = Abnormal                           

    



             27224-4)                                            



[facility] GLUCOSE (AUTOMATED)2022 18:07:54





             Test Item    Value        Reference Range Interpretation Comments

 

             POCT GLU (test code = 2190191634) 198 mg/dL           H      

      

 

             Lab Interpretation (test code = Abnormal                           

    



             67007-0)                                            



[facility] GLUCOSE (AUTOMATED)2022 13:54:10





             Test Item    Value        Reference Range Interpretation Comments

 

             POCT GLU (test code = 1925881367) 141 mg/dL           H      

      

 

             Lab Interpretation (test code = Abnormal                           

    



             14589-9)                                            



[facility] GLUCOSE (AUTOMATED)2022 02:47:37





             Test Item    Value        Reference Range Interpretation Comments

 

             POCT GLU (test code = 0758880196) 150 mg/dL           H      

      

 

             Lab Interpretation (test code = Abnormal                           

    



             44311-5)                                            



[facility]. METABOLIC PANEL (92125)2022 
23:49:17





             Test Item    Value        Reference Range Interpretation Comments

 

             NA (test code = 139 mmol/L   135-145                   



             7191508310)                                         

 

             K (test code = 4.5 mmol/L   3.5-5.0                   



             0562167775)                                         

 

             CL (test code = 104 mmol/L                       



             6772931027)                                         

 

             CO2 TOTAL (test code = 25 mmol/L    23-31                     



             2446905390)                                         

 

             AGAP (test code =              2-16                      



             7583175379)                                         

 

             BUN (test code = 13 mg/dL     7-23                      



             4883818291)                                         

 

             GLUCOSE (test code = 228 mg/dL           H            



             1686224647)                                         

 

             CREATININE (test code = 0.56 mg/dL   0.60-1.25    L            



             6444234029)                                         

 

             TOTAL BILI (test code = 0.6 mg/dL    0.1-1.1                   



             1678897343)                                         

 

             CALCIUM (test code = 9.7 mg/dL    8.6-10.6                  



             3630127205)                                         

 

             T PROTEIN (test code = 7.8 g/dL     6.3-8.2                   



             1249792700)                                         

 

             ALBUMIN (test code = 4.4 g/dL     3.5-5.0                   



             4592021233)                                         

 

             ALK PHOS (test code = 132 U/L             H            



             6287722701)                                         

 

             ALTv (test code = 31 U/L       5-50                      



             1742-6)                                             

 

             AST(SGOT) (test code = 20 U/L       13-40                     



             5987536596)                                         

 

             eGFR (test code =              mL/min/1.73m2              



             6321385076)                                         

 

             MAL (test code = MAL) Association of                           



                          Glomerular Filtration                           



                          Rate (GFR) and Staging                           



                          of Kidney Disease*                           



                          +---------------------                           



                          --+-------------------                           



                          --+-------------------                           



                          ------+| GFR                           



                          (mL/min/1.73 m2) ?|                           



                          With Kidney Damage ?|                           



                          ?Without Kidney                           



                          Damage+---------------                           



                          --------+-------------                           



                          --------+-------------                           



                          ------------+| ?>90 ?                           



                          ? ? ? ? ? ? ? ?|                           



                          ?Stage one ? ? ? ? ?|                           



                          ? Normal ? ? ? ? ? ? ?                           



                          ?+--------------------                           



                          ---+------------------                           



                          ---+------------------                           



                          -------+| ?60-89 ? ? ?                           



                          ? ? ? ? ?| ?Stage two                           



                          ? ? ? ? ?| ? Decreased                           



                          GFR ? ? ? ?                            



                          +---------------------                           



                          --+-------------------                           



                          --+-------------------                           



                          ------+| ?30-59 ? ? ?                           



                          ? ? ? ? ?| ?Stage                           



                          three ? ? ? ?| ? Stage                           



                          three ? ? ? ? ?                           



                          +---------------------                           



                          --+-------------------                           



                          --+-------------------                           



                          ------+| ?15-29 ? ? ?                           



                          ? ? ? ? ?| ?Stage four                           



                          ? ? ? ? | ? Stage four                           



                          ? ? ? ? ?                              



                          ?+--------------------                           



                          ---+------------------                           



                          ---+------------------                           



                          -------+| ?<15 (or                           



                          dialysis) ? ?| ?Stage                           



                          five ? ? ? ? | ? Stage                           



                          five ? ? ? ? ?                           



                          ?+--------------------                           



                          ---+------------------                           



                          ---+------------------                           



                          -------+ *Each stage                           



                          assumes the associated                           



                          GFR level has been in                           



                          effect for at least                           



                          three months. ?Stages                           



                          1 to 5, with or                           



                          without kidney                           



                          disease, indicate                           



                          chronic kidney                           



                          disease. Notes:                           



                          Determination of                           



                          stages one and two                           



                          (with eGFR                             



                          >59mL/min/1.73 m2)                           



                          requires estimation of                           



                          kidney damage for at                           



                          least three months as                           



                          defined by structural                           



                          or functional                           



                          abnormalities of the                           



                          kidney, manifested by                           



                          either:Pathological                           



                          abnormalities or                           



                          Markers of kidney                           



                          damage (including                           



                          abnormalities in the                           



                          composition of the                           



                          blood or urine or                           



                          abnormalities in                           



                          imaging tests).                           

 

             Lab Interpretation Abnormal                               



             (test code = 45971-7)                                        



[facility] WITH [id number] 23:45:35





             Test Item    Value        Reference Range Interpretation Comments

 

             WBC (test code =              See_Comment                [Automated



             0098-2)                                             message] The sy

stem



                                                                 which generated



                                                                 this result



                                                                 transmitted



                                                                 reference range

:



                                                                 4.20 - 10.70



                                                                 10*3/?L. The



                                                                 reference range

 was



                                                                 not used to



                                                                 interpret this



                                                                 result as



                                                                 normal/abnormal

.

 

             RBC (test code =              See_Comment  H             [Automated



             763-8)                                              message] The sy

stem



                                                                 which generated



                                                                 this result



                                                                 transmitted



                                                                 reference range

:



                                                                 4.26 - 5.52



                                                                 10*6/?L. The



                                                                 reference range

 was



                                                                 not used to



                                                                 interpret this



                                                                 result as



                                                                 normal/abnormal

.

 

             HGB (test code = 16.4 g/dL    12.2-16.4                 



             718-7)                                              

 

             HCT (test code = 49.3 %       38.4-49.3                 



             4544-3)                                             

 

             MCV (test code = 83.6 fL      81.7-95.6                 



             787-2)                                              

 

             MCH (test code = 27.8 pg      26.1-32.7                 



             785-6)                                              

 

             MCHC (test code = 33.3 g/dL    31.2-35.0                 



             786-4)                                              

 

             RDW-SD (test code = 45.1 fL      38.5-51.6                 



             98743-9)                                            

 

             RDW-CV (test code = 14.9 %       12.1-15.4                 



             788-0)                                              

 

             PLT (test code =              See_Comment                [Automated



             777-3)                                              message] The sy

stem



                                                                 which generated



                                                                 this result



                                                                 transmitted



                                                                 reference range

:



                                                                 150 - 328 10*3/

?L.



                                                                 The reference r

zhen



                                                                 was not used to



                                                                 interpret this



                                                                 result as



                                                                 normal/abnormal

.

 

             MPV (test code = 10.9 fL      9.8-13.0                  



             97822-8)                                            

 

             IPF % (test code = 9.9 %        1.2-10.7                  Platelet 

count



             1492062825)                                         measured by



                                                                 fluorescence



                                                                 method.

 

             NRBC/100 WBC (test              See_Comment                [Automat

ed



             code = 0865589809)                                        message] 

The system



                                                                 which generated



                                                                 this result



                                                                 transmitted



                                                                 reference range

:



                                                                 0.0 - 10.0 /100



                                                                 WBCs. The refer

ence



                                                                 range was not u

sed



                                                                 to interpret th

is



                                                                 result as



                                                                 normal/abnormal

.

 

             NRBC x10^3 (test code              See_Comment                [Auto

mated



             = 5095500577)                                        message] The s

ystem



                                                                 which generated



                                                                 this result



                                                                 transmitted



                                                                 reference range

:



                                                                 10*3/?L. The



                                                                 reference range

 was



                                                                 not used to



                                                                 interpret this



                                                                 result as



                                                                 normal/abnormal

.

 

             GRAN MAT (NEUT) % 65.4 %                                 



             (test code = 770-8)                                        

 

             IMM GRAN % (test code 0.60 %                                 



             = [id number])                                        

 

             LYMPH % (test code = 23.1 %                                 



             736-9)                                              

 

             MONO % (test code = 7.9 %                                  



             5905-5)                                             

 

             EOS % (test code = 2.6 %                                  



             713-8)                                              

 

             BASO % (test code = 0.4 %                                  



             706-2)                                              

 

             GRAN MAT x10^3(ANC) 6.34 10*3/uL 1.99-6.95                 



             (test code =                                        



             4994678071)                                         

 

             IMM GRAN x10^3 (test 0.06 10*3/uL 0.00-0.06                 



             code = [id number])                                        

 

             LYMPH x10^3 (test code 2.24 10*3/uL 1.09-3.23                 



             = 731-0)                                            

 

             MONO x10^3 (test code 0.77 10*3/uL 0.36-1.02                 



             = 742-7)                                            

 

             EOS x10^3 (test code = 0.25 10*3/uL 0.06-0.53                 



             711-2)                                              

 

             BASO x10^3 (test code 0.04 10*3/uL 0.01-0.09                 



             = 704-7)                                            

 

             Lab Interpretation Abnormal                               



             (test code = 60727-8)                                        



[id number]59





             Test Item    Value        Reference Range Interpretation Comments

 

             LACTIC ACID (test code = 3.00 mmol/L  0.50-2.20    H            



             3879812838)                                         

 

             Lab Interpretation (test code = Abnormal                           

    



             48697-3)                                            



[facility] WITH DIFF2022-10-30 01:28:46





             Test Item    Value        Reference Range Interpretation Comments

 

             WBC (test code =              See_Comment                [Automated



             6690-2)                                             message] The sy

stem



                                                                 which generated



                                                                 this result



                                                                 transmitted



                                                                 reference range

:



                                                                 4.20 - 10.70



                                                                 10*3/?L. The



                                                                 reference range

 was



                                                                 not used to



                                                                 interpret this



                                                                 result as



                                                                 normal/abnormal

.

 

             RBC (test code =              See_Comment  H             [Automated



             789-8)                                              message] The sy

stem



                                                                 which generated



                                                                 this result



                                                                 transmitted



                                                                 reference range

:



                                                                 4.26 - 5.52



                                                                 10*6/?L. The



                                                                 reference range

 was



                                                                 not used to



                                                                 interpret this



                                                                 result as



                                                                 normal/abnormal

.

 

             HGB (test code = 17.4 g/dL    12.2-16.4    H            



             718-7)                                              

 

             HCT (test code = 52.0 %       38.4-49.3    H            



             4544-3)                                             

 

             MCV (test code = 85.5 fL      81.7-95.6                 



             787-2)                                              

 

             MCH (test code = 28.6 pg      26.1-32.7                 



             785-6)                                              

 

             MCHC (test code = 33.5 g/dL    31.2-35.0                 



             786-4)                                              

 

             RDW-SD (test code = 47.6 fL      38.5-51.6                 



             99570-1)                                            

 

             RDW-CV (test code = 15.4 %       12.1-15.4                 



             788-0)                                              

 

             PLT (test code =              See_Comment  L             [Automated



             777-3)                                              message] The sy

stem



                                                                 which generated



                                                                 this result



                                                                 transmitted



                                                                 reference range

:



                                                                 150 - 328 10*3/

?L.



                                                                 The reference r

zhen



                                                                 was not used to



                                                                 interpret this



                                                                 result as



                                                                 normal/abnormal

.

 

             MPV (test code = 10.6 fL      9.8-13.0                  



             45913-6)                                            

 

             NRBC/100 WBC (test              See_Comment                [Automat

ed



             code = 5286163103)                                        message] 

The system



                                                                 which generated



                                                                 this result



                                                                 transmitted



                                                                 reference range

:



                                                                 0.0 - 10.0 /100



                                                                 WBCs. The refer

ence



                                                                 range was not u

sed



                                                                 to interpret th

is



                                                                 result as



                                                                 normal/abnormal

.

 

             NRBC x10^3 (test code              See_Comment                [Auto

mated



             = 7876625693)                                        message] The s

ystem



                                                                 which generated



                                                                 this result



                                                                 transmitted



                                                                 reference range

:



                                                                 10*3/?L. The



                                                                 reference range

 was



                                                                 not used to



                                                                 interpret this



                                                                 result as



                                                                 normal/abnormal

.

 

             GRAN MAT (NEUT) % 70.8 %                                 



             (test code = 770-8)                                        

 

             IMM GRAN % (test code 0.50 %                                 



             = 4422124389)                                        

 

             LYMPH % (test code = 20.4 %                                 



             736-9)                                              

 

             MONO % (test code = 5.9 %                                  



             5905-5)                                             

 

             EOS % (test code = 2.0 %                                  



             713-8)                                              

 

             BASO % (test code = 0.4 %                                  



             706-2)                                              

 

             GRAN MAT x10^3(ANC) 7.23 10*3/uL 1.99-6.95    H            



             (test code =                                        



             0738100168)                                         

 

             IMM GRAN x10^3 (test 0.05 10*3/uL 0.00-0.06                 



             code = 5950175356)                                        

 

             LYMPH x10^3 (test code 2.08 10*3/uL 1.09-3.23                 



             = 731-0)                                            

 

             MONO x10^3 (test code 0.60 10*3/uL 0.36-1.02                 



             = 742-7)                                            

 

             EOS x10^3 (test code = 0.20 10*3/uL 0.06-0.53                 



             711-2)                                              

 

             BASO x10^3 (test code 0.04 10*3/uL 0.01-0.09                 



             = 704-7)                                            

 

             Lab Interpretation Abnormal                               



             (test code = 85147-4)                                        



[facility]. METABOLIC PANEL ([id number])2022-10-30 
01:23:44





             Test Item    Value        Reference Range Interpretation Comments

 

             NA (test code = 141 mmol/L   135-145                   



             0467822369)                                         

 

             K (test code = 4.7 mmol/L   3.5-5.0                   



             7703653569)                                         

 

             CL (test code = 107 mmol/L                       



             7635261492)                                         

 

             CO2 TOTAL (test code = 23 mmol/L    23-31                     



             5663796289)                                         

 

             AGAP (test code =              2-16                      



             6871780927)                                         

 

             BUN (test code = 16 mg/dL     7-23                      



             2206262168)                                         

 

             GLUCOSE (test code = 144 mg/dL           H            



             6665049279)                                         

 

             CREATININE (test code = 0.54 mg/dL   0.60-1.25    L            



             3874603730)                                         

 

             TOTAL BILI (test code = 0.6 mg/dL    0.1-1.1                   



             4063918224)                                         

 

             CALCIUM (test code = 9.5 mg/dL    8.6-10.6                  



             9630882616)                                         

 

             T PROTEIN (test code = 7.7 g/dL     6.3-8.2                   



             0181101779)                                         

 

             ALBUMIN (test code = 4.4 g/dL     3.5-5.0                   



             4561056544)                                         

 

             ALK PHOS (test code = 101 U/L                          



             2375590351)                                         

 

             ALTv (test code = 28 U/L       5-50                      



             1742-6)                                             

 

             AST(SGOT) (test code = 20 U/L       13-40                     



             3392721354)                                         

 

             eGFR (test code =              mL/min/1.73m2              



             3664607709)                                         

 

             MAL (test code = MAL) Association of                           



                          Glomerular Filtration                           



                          Rate (GFR) and Staging                           



                          of Kidney Disease*                           



                          +---------------------                           



                          --+-------------------                           



                          --+-------------------                           



                          ------+| GFR                           



                          (mL/min/1.73 m2) ?|                           



                          With Kidney Damage ?|                           



                          ?Without Kidney                           



                          Damage+---------------                           



                          --------+-------------                           



                          --------+-------------                           



                          ------------+| ?>90 ?                           



                          ? ? ? ? ? ? ? ?|                           



                          ?Stage one ? ? ? ? ?|                           



                          ? Normal ? ? ? ? ? ? ?                           



                          ?+--------------------                           



                          ---+------------------                           



                          ---+------------------                           



                          -------+| ?60-89 ? ? ?                           



                          ? ? ? ? ?| ?Stage two                           



                          ? ? ? ? ?| ? Decreased                           



                          GFR ? ? ? ?                            



                          +---------------------                           



                          --+-------------------                           



                          --+-------------------                           



                          ------+| ?30-59 ? ? ?                           



                          ? ? ? ? ?| ?Stage                           



                          three ? ? ? ?| ? Stage                           



                          three ? ? ? ? ?                           



                          +---------------------                           



                          --+-------------------                           



                          --+-------------------                           



                          ------+| ?15-29 ? ? ?                           



                          ? ? ? ? ?| ?Stage four                           



                          ? ? ? ? | ? Stage four                           



                          ? ? ? ? ?                              



                          ?+--------------------                           



                          ---+------------------                           



                          ---+------------------                           



                          -------+| ?<15 (or                           



                          dialysis) ? ?| ?Stage                           



                          five ? ? ? ? | ? Stage                           



                          five ? ? ? ? ?                           



                          ?+--------------------                           



                          ---+------------------                           



                          ---+------------------                           



                          -------+ *Each stage                           



                          assumes the associated                           



                          GFR level has been in                           



                          effect for at least                           



                          three months. ?Stages                           



                          1 to 5, with or                           



                          without kidney                           



                          disease, indicate                           



                          chronic kidney                           



                          disease. Notes:                           



                          Determination of                           



                          stages one and two                           



                          (with eGFR                             



                          >59mL/min/1.73 m2)                           



                          requires estimation of                           



                          kidney damage for at                           



                          least three months as                           



                          defined by structural                           



                          or functional                           



                          abnormalities of the                           



                          kidney, manifested by                           



                          either:Pathological                           



                          abnormalities or                           



                          Markers of kidney                           



                          damage (including                           



                          abnormalities in the                           



                          composition of the                           



                          blood or urine or                           



                          abnormalities in                           



                          imaging tests).                           

 

             Lab Interpretation Abnormal                               



             (test code = 41827-8)                                        



[facility]





             Test Item    Value        Reference Range Interpretation Comments

 

             LIPASE (test code = 5092136097) 99 U/L       0-220                 

    

 

             Lab Interpretation (test code = Normal                             

    



             71301-3)                                            



[facility] GLUCOSE (AUTOMATED)2022-10-23 13:16:26





             Test Item    Value        Reference Range Interpretation Comments

 

             POCT GLU (test code = 5682235593) 162 mg/dL           H      

      

 

             Lab Interpretation (test code = Abnormal                           

    



             54988-4)                                            



[facility] GLUCOSE (AUTOMATED)2022-10-23 01:23:12





             Test Item    Value        Reference Range Interpretation Comments

 

             POCT GLU (test code = 2703522956) 248 mg/dL           H      

      

 

             Lab Interpretation (test code = Abnormal                           

    



             05123-2)                                            



[facility] GLUCOSE (AUTOMATED)2022-10-22 21:32:18





             Test Item    Value        Reference Range Interpretation Comments

 

             POCT GLU (test code = 6779531419) 239 mg/dL           H      

      

 

             Lab Interpretation (test code = Abnormal                           

    



             02792-9)                                            



[facility] GLUCOSE (AUTOMATED)2022-10-22 01:49:48





             Test Item    Value        Reference Range Interpretation Comments

 

             POCT GLU (test code = 9762538310) 317 mg/dL           H      

      

 

             Lab Interpretation (test code = Abnormal                           

    



             14240-8)                                            



[facility] GLUCOSE (AUTOMATED)2022-10-21 21:38:03





             Test Item    Value        Reference Range Interpretation Comments

 

             POCT GLU (test code = 8402702249) 139 mg/dL           H      

      

 

             Lab Interpretation (test code = Abnormal                           

    



             94352-0)                                            



[facility] GLUCOSE (AUTOMATED)2022-10-21 16:14:31





             Test Item    Value        Reference Range Interpretation Comments

 

             POCT GLU (test code = 1050634922) 164 mg/dL           H      

      

 

             Lab Interpretation (test code = Abnormal                           

    



             89181-6)                                            



[facility] GLUCOSE (AUTOMATED)2022-10-21 12:46:40





             Test Item    Value        Reference Range Interpretation Comments

 

             POCT GLU (test code = 6559924155) 154 mg/dL           H      

      

 

             Lab Interpretation (test code = Abnormal                           

    



             20937-5)                                            



[facility]52





             Test Item    Value        Reference Range Interpretation Comments

 

             LIPASE (test code = 3032758629) 216 U/L      0-220                 

    

 

             Lab Interpretation (test code = Normal                             

    



             89873-3)                                            



[facility]. METABOLIC PANEL ([id number])2022-10-21 
06:39:52





             Test Item    Value        Reference Range Interpretation Comments

 

             NA (test code = 139 mmol/L   135-145                   



             [id number])                                         

 

             K (test code = 4.7 mmol/L   3.5-5                     



             7653364907)                                         

 

             CL (test code = 106 mmol/L                       



             9423030865)                                         

 

             CO2 TOTAL (test code = 20 mmol/L    23-31        L            



             0571862495)                                         

 

             AGAP (test code =              2-16                      



             3463519136)                                         

 

             BUN (test code = 16 mg/dL     7-23                      



             1563887431)                                         

 

             GLUCOSE (test code = 224 mg/dL           H            



             8707882789)                                         

 

             CREATININE (test code = 0.72 mg/dL   0.6-1.25                  



             9289687200)                                         

 

             TOTAL BILI (test code = 0.4 mg/dL    0.1-1.1                   



             3019721545)                                         

 

             CALCIUM (test code = 9.3 mg/dL    8.6-10.6                  



             5622371156)                                         

 

             T PROTEIN (test code = 7.2 g/dL     6.3-8.2                   



             3365775505)                                         

 

             ALBUMIN (test code = 4.1 g/dL     3.5-5                     



             5588656581)                                         

 

             ALK PHOS (test code = 118 U/L                          



             5792429004)                                         

 

             ALTv (test code = 46 U/L       5-50                      



             1742-6)                                             

 

             AST(SGOT) (test code = 18 U/L       13-40                     



             8530378491)                                         

 

             eGFR (test code =              mL/min/1.73m2              



             6928050515)                                         

 

             MAL (test code = MAL) Association of                           



                          Glomerular Filtration                           



                          Rate (GFR) and Staging                           



                          of Kidney Disease*                           



                          +---------------------                           



                          --+-------------------                           



                          --+-------------------                           



                          ------+| GFR                           



                          (mL/min/1.73 m2) ?|                           



                          With Kidney Damage ?|                           



                          ?Without Kidney                           



                          Damage+---------------                           



                          --------+-------------                           



                          --------+-------------                           



                          ------------+| ?>90 ?                           



                          ? ? ? ? ? ? ? ?|                           



                          ?Stage one ? ? ? ? ?|                           



                          ? Normal ? ? ? ? ? ? ?                           



                          ?+--------------------                           



                          ---+------------------                           



                          ---+------------------                           



                          -------+| ?60-89 ? ? ?                           



                          ? ? ? ? ?| ?Stage two                           



                          ? ? ? ? ?| ? Decreased                           



                          GFR ? ? ? ?                            



                          +---------------------                           



                          --+-------------------                           



                          --+-------------------                           



                          ------+| ?30-59 ? ? ?                           



                          ? ? ? ? ?| ?Stage                           



                          three ? ? ? ?| ? Stage                           



                          three ? ? ? ? ?                           



                          +---------------------                           



                          --+-------------------                           



                          --+-------------------                           



                          ------+| ?15-29 ? ? ?                           



                          ? ? ? ? ?| ?Stage four                           



                          ? ? ? ? | ? Stage four                           



                          ? ? ? ? ?                              



                          ?+--------------------                           



                          ---+------------------                           



                          ---+------------------                           



                          -------+| ?<15 (or                           



                          dialysis) ? ?| ?Stage                           



                          five ? ? ? ? | ? Stage                           



                          five ? ? ? ? ?                           



                          ?+--------------------                           



                          ---+------------------                           



                          ---+------------------                           



                          -------+ *Each stage                           



                          assumes the associated                           



                          GFR level has been in                           



                          effect for at least                           



                          three months. ?Stages                           



                          1 to 5, with or                           



                          without kidney                           



                          disease, indicate                           



                          chronic kidney                           



                          disease. Notes:                           



                          Determination of                           



                          stages one and two                           



                          (with eGFR                             



                          >59mL/min/1.73 m2)                           



                          requires estimation of                           



                          kidney damage for at                           



                          least three months as                           



                          defined by structural                           



                          or functional                           



                          abnormalities of the                           



                          kidney, manifested by                           



                          either:Pathological                           



                          abnormalities or                           



                          Markers of kidney                           



                          damage (including                           



                          abnormalities in the                           



                          composition of the                           



                          blood or urine or                           



                          abnormalities in                           



                          imaging tests).                           

 

             Lab Interpretation Abnormal                               



             (test code = 32342-9)                                        



[facility] WITH DIFF2022-10-21 06:28:46





             Test Item    Value        Reference Range Interpretation Comments

 

             WBC (test code =              See_Comment                [Automated



             7690-2)                                             message] The sy

stem



                                                                 which generated



                                                                 this result



                                                                 transmitted



                                                                 reference range

:



                                                                 4.20 - 10.70



                                                                 10*3/?L. The



                                                                 reference range

 was



                                                                 not used to



                                                                 interpret this



                                                                 result as



                                                                 normal/abnormal

.

 

             RBC (test code =              See_Comment                [Automated



             919-8)                                              message] The sy

stem



                                                                 which generated



                                                                 this result



                                                                 transmitted



                                                                 reference range

:



                                                                 4.26 - 5.52



                                                                 10*6/?L. The



                                                                 reference range

 was



                                                                 not used to



                                                                 interpret this



                                                                 result as



                                                                 normal/abnormal

.

 

             HGB (test code = 15.6 g/dL    12.2-16.4                 



             718-7)                                              

 

             HCT (test code = 46.3 %       38.4-49.3                 



             4544-3)                                             

 

             MCV (test code = 84.6 fL      81.7-95.6                 



             787-2)                                              

 

             MCH (test code = 28.5 pg      26.1-32.7                 



             785-6)                                              

 

             MCHC (test code = 33.7 g/dL    31.2-35                   



             786-4)                                              

 

             RDW-SD (test code = 45.7 fL      38.5-51.6                 



             98357-0)                                            

 

             RDW-CV (test code = 14.8 %       12.1-15.4                 



             788-0)                                              

 

             PLT (test code =              See_Comment  H             [Automated



             967-3)                                              message] The sy

stem



                                                                 which generated



                                                                 this result



                                                                 transmitted



                                                                 reference range

:



                                                                 150 - 328 10*3/

?L.



                                                                 The reference r

zhne



                                                                 was not used to



                                                                 interpret this



                                                                 result as



                                                                 normal/abnormal

.

 

             MPV (test code = 11.0 fL      9.8-13                    



             35213-6)                                            

 

             NRBC/100 WBC (test              See_Comment                [Automat

ed



             code = 5444606333)                                        message] 

The system



                                                                 which generated



                                                                 this result



                                                                 transmitted



                                                                 reference range

:



                                                                 0.0 - 10.0 /100



                                                                 WBCs. The refer

ence



                                                                 range was not u

sed



                                                                 to interpret th

is



                                                                 result as



                                                                 normal/abnormal

.

 

             NRBC x10^3 (test code              See_Comment                [Auto

mated



             = 4140936480)                                        message] The s

ystem



                                                                 which generated



                                                                 this result



                                                                 transmitted



                                                                 reference range

:



                                                                 10*3/?L. The



                                                                 reference range

 was



                                                                 not used to



                                                                 interpret this



                                                                 result as



                                                                 normal/abnormal

.

 

             GRAN MAT (NEUT) % 56.7 %                                 



             (test code = 770-8)                                        

 

             IMM GRAN % (test code 0.70 %                                 



             = 3052889101)                                        

 

             LYMPH % (test code = 31.5 %                                 



             736-9)                                              

 

             MONO % (test code = 8.6 %                                  



             5905-5)                                             

 

             EOS % (test code = 2.0 %                                  



             713-8)                                              

 

             BASO % (test code = 0.5 %                                  



             706-2)                                              

 

             GRAN MAT x10^3(ANC) 4.84 10*3/uL 1.99-6.95                 



             (test code =                                        



             6763818342)                                         

 

             IMM GRAN x10^3 (test 0.06 10*3/uL 0-0.06                    



             code = 9307757171)                                        

 

             LYMPH x10^3 (test code 2.69 10*3/uL 1.09-3.23                 



             = 731-0)                                            

 

             MONO x10^3 (test code 0.73 10*3/uL 0.36-1.02                 



             = 742-7)                                            

 

             EOS x10^3 (test code = 0.17 10*3/uL 0.06-0.53                 



             711-2)                                              

 

             BASO x10^3 (test code 0.04 10*3/uL 0.01-0.09                 



             = 704-7)                                            

 

             Lab Interpretation Abnormal                               



             (test code = 58986-0)                                        



[facility] CULTURE(AEROBIC/ANAEROBIC)2022 
20:34:19





             Test Item    Value        Reference Range Interpretation Comments

 

             TISSUE CULTURE (test No aerobic/anaerobic                          

 



             code = 20474-3) organisms isolated                           

 

             Gram stain (test code No PMNs or Mononuclear                       

    



             = 664-3)     cells observed                           



[facility] GLUCOSE (AUTOMATED)2022 18:36:41





             Test Item    Value        Reference Range Interpretation Comments

 

             POCT GLU (test code = 8468068605) 162 mg/dL           H      

      

 

             Lab Interpretation (test code = Abnormal                           

    



             19818-8)                                            



[facility] GLUCOSE (AUTOMATED)2022 14:48:29





             Test Item    Value        Reference Range Interpretation Comments

 

             POCT GLU (test code = 8314693238) 191 mg/dL           H      

      

 

             Lab Interpretation (test code = Abnormal                           

    



             70741-9)                                            



[facility] GLUCOSE (AUTOMATED)2022 10:56:47





             Test Item    Value        Reference Range Interpretation Comments

 

             POCT GLU (test code = 6101412286) 242 mg/dL           H      

      

 

             Lab Interpretation (test code = Abnormal                           

    



             18203-4)                                            



[facility] GLUCOSE (AUTOMATED)2022 05:47:30





             Test Item    Value        Reference Range Interpretation Comments

 

             POCT GLU (test code = 5984110244) 327 mg/dL           H      

      

 

             Lab Interpretation (test code = Abnormal                           

    



             00294-5)                                            



[facility] GLUCOSE (AUTOMATED)2022 02:18:34





             Test Item    Value        Reference Range Interpretation Comments

 

             POCT GLU (test code = 3621744089) 309 mg/dL           H      

      

 

             Lab Interpretation (test code = Abnormal                           

    



             33719-5)                                            



[facility] GLUCOSE (AUTOMATED)2022 23:17:38





             Test Item    Value        Reference Range Interpretation Comments

 

             POCT GLU (test code = 0999413983) 260 mg/dL           H      

      

 

             Lab Interpretation (test code = Abnormal                           

    



             78699-8)                                            



[facility] GLUCOSE (AUTOMATED)2022 18:33:41





             Test Item    Value        Reference Range Interpretation Comments

 

             POCT GLU (test code = 5532685415) 264 mg/dL           H      

      

 

             Lab Interpretation (test code = Abnormal                           

    



             18434-6)                                            



[facility] GLUCOSE (AUTOMATED)2022 15:02:50





             Test Item    Value        Reference Range Interpretation Comments

 

             POCT GLU (test code = 2912573158) 219 mg/dL           H      

      

 

             Lab Interpretation (test code = Abnormal                           

    



             32660-2)                                            



[facility] METABOLIC PANEL (NA, K, CL, CO2, 
GLUCOSE, BUN, CREATININE, CA)2022 10:44:27





             Test Item    Value        Reference Range Interpretation Comments

 

             NA (test code = 132 mmol/L   135-145      L            



             0929493699)                                         

 

             K (test code = 4.4 mmol/L   3.5-5                     



             7576471904)                                         

 

             CL (test code = 103 mmol/L                       



             2579454374)                                         

 

             CO2 TOTAL (test code = 25 mmol/L    23-31                     



             3318729024)                                         

 

             AGAP (test code =              2-16                      



             4425370605)                                         

 

             BUN (test code = 15 mg/dL     7-23                      



             3638339563)                                         

 

             GLUCOSE (test code = 262 mg/dL           H            



             9536780049)                                         

 

             CREATININE (test code = 0.52 mg/dL   0.6-1.25     L            



             7511899089)                                         

 

             CALCIUM (test code = 8.3 mg/dL    8.6-10.6     L            



             9868472379)                                         

 

             eGFR (test code =              mL/min/1.73m2              



             3819574757)                                         

 

             MAL (test code = MAL) Association of                           



                          Glomerular Filtration                           



                          Rate (GFR) and Staging                           



                          of Kidney Disease*                           



                          +---------------------                           



                          --+-------------------                           



                          --+-------------------                           



                          ------+| GFR                           



                          (mL/min/1.73 m2) ?|                           



                          With Kidney Damage ?|                           



                          ?Without Kidney                           



                          Damage+---------------                           



                          --------+-------------                           



                          --------+-------------                           



                          ------------+| ?>90 ?                           



                          ? ? ? ? ? ? ? ?|                           



                          ?Stage one ? ? ? ? ?|                           



                          ? Normal ? ? ? ? ? ? ?                           



                          ?+--------------------                           



                          ---+------------------                           



                          ---+------------------                           



                          -------+| ?60-89 ? ? ?                           



                          ? ? ? ? ?| ?Stage two                           



                          ? ? ? ? ?| ? Decreased                           



                          GFR ? ? ? ?                            



                          +---------------------                           



                          --+-------------------                           



                          --+-------------------                           



                          ------+| ?30-59 ? ? ?                           



                          ? ? ? ? ?| ?Stage                           



                          three ? ? ? ?| ? Stage                           



                          three ? ? ? ? ?                           



                          +---------------------                           



                          --+-------------------                           



                          --+-------------------                           



                          ------+| ?15-29 ? ? ?                           



                          ? ? ? ? ?| ?Stage four                           



                          ? ? ? ? | ? Stage four                           



                          ? ? ? ? ?                              



                          ?+--------------------                           



                          ---+------------------                           



                          ---+------------------                           



                          -------+| ?<15 (or                           



                          dialysis) ? ?| ?Stage                           



                          five ? ? ? ? | ? Stage                           



                          five ? ? ? ? ?                           



                          ?+--------------------                           



                          ---+------------------                           



                          ---+------------------                           



                          -------+ *Each stage                           



                          assumes the associated                           



                          GFR level has been in                           



                          effect for at least                           



                          three months. ?Stages                           



                          1 to 5, with or                           



                          without kidney                           



                          disease, indicate                           



                          chronic kidney                           



                          disease. Notes:                           



                          Determination of                           



                          stages one and two                           



                          (with eGFR                             



                          >59mL/min/1.73 m2)                           



                          requires estimation of                           



                          kidney damage for at                           



                          least three months as                           



                          defined by structural                           



                          or functional                           



                          abnormalities of the                           



                          kidney, manifested by                           



                          either:Pathological                           



                          abnormalities or                           



                          Markers of kidney                           



                          damage (including                           



                          abnormalities in the                           



                          composition of the                           



                          blood or urine or                           



                          abnormalities in                           



                          imaging tests).                           

 

             Lab Interpretation Abnormal                               



             (test code = 55867-3)                                        



[facility] WITH [id number] 10:22:05





             Test Item    Value        Reference Range Interpretation Comments

 

             WBC (test code =              See_Comment                [Automated



             6690-2)                                             message] The sy

stem



                                                                 which generated



                                                                 this result



                                                                 transmitted



                                                                 reference range

:



                                                                 4.20 - 10.70



                                                                 10*3/?L. The



                                                                 reference range

 was



                                                                 not used to



                                                                 interpret this



                                                                 result as



                                                                 normal/abnormal

.

 

             RBC (test code =              See_Comment  L             [Automated



             789-8)                                              message] The sy

stem



                                                                 which generated



                                                                 this result



                                                                 transmitted



                                                                 reference range

:



                                                                 4.26 - 5.52



                                                                 10*6/?L. The



                                                                 reference range

 was



                                                                 not used to



                                                                 interpret this



                                                                 result as



                                                                 normal/abnormal

.

 

             HGB (test code = 10.5 g/dL    12.2-16.4    L            



             718-7)                                              

 

             HCT (test code = 31.2 %       38.4-49.3    L            



             4544-3)                                             

 

             MCV (test code = 88.9 fL      81.7-95.6                 



             787-2)                                              

 

             MCH (test code = 29.9 pg      26.1-32.7                 



             785-6)                                              

 

             MCHC (test code = 33.7 g/dL    31.2-35                   



             786-4)                                              

 

             RDW-SD (test code = 49.8 fL      38.5-51.6                 



             20310-3)                                            

 

             RDW-CV (test code = 15.9 %       12.1-15.4    H            



             788-0)                                              

 

             PLT (test code =              See_Comment  H             [Automated



             777-3)                                              message] The sy

stem



                                                                 which generated



                                                                 this result



                                                                 transmitted



                                                                 reference range

:



                                                                 150 - 328 10*3/

?L.



                                                                 The reference r

zhen



                                                                 was not used to



                                                                 interpret this



                                                                 result as



                                                                 normal/abnormal

.

 

             MPV (test code = 10.4 fL      9.8-13                    



             66501-4)                                            

 

             NRBC/100 WBC (test              See_Comment                [Automat

ed



             code = 9971047822)                                        message] 

The system



                                                                 which generated



                                                                 this result



                                                                 transmitted



                                                                 reference range

:



                                                                 0.0 - 10.0 /100



                                                                 WBCs. The refer

ence



                                                                 range was not u

sed



                                                                 to interpret th

is



                                                                 result as



                                                                 normal/abnormal

.

 

             NRBC x10^3 (test code              See_Comment                [Auto

mated



             = 7577149827)                                        message] The s

ystem



                                                                 which generated



                                                                 this result



                                                                 transmitted



                                                                 reference range

:



                                                                 10*3/?L. The



                                                                 reference range

 was



                                                                 not used to



                                                                 interpret this



                                                                 result as



                                                                 normal/abnormal

.

 

             GRAN MAT (NEUT) % 59.8 %                                 



             (test code = 770-8)                                        

 

             IMM GRAN % (test code 1.20 %                                 



             = 8660929420)                                        

 

             LYMPH % (test code = 28.2 %                                 



             736-9)                                              

 

             MONO % (test code = 8.4 %                                  



             5905-5)                                             

 

             EOS % (test code = 2.2 %                                  



             713-8)                                              

 

             BASO % (test code = 0.2 %                                  



             706-2)                                              

 

             GRAN MAT x10^3(ANC) 5.34 10*3/uL 1.99-6.95                 



             (test code =                                        



             8810615456)                                         

 

             IMM GRAN x10^3 (test 0.11 10*3/uL 0-0.06       H            



             code = 2614906365)                                        

 

             LYMPH x10^3 (test code 2.52 10*3/uL 1.09-3.23                 



             = 731-0)                                            

 

             MONO x10^3 (test code 0.75 10*3/uL 0.36-1.02                 



             = 742-7)                                            

 

             EOS x10^3 (test code = 0.20 10*3/uL 0.06-0.53                 



             711-2)                                              

 

             BASO x10^3 (test code              0.01-0.09                 



             = 704-7)                                            

 

             Lab Interpretation Abnormal                               



             (test code = 66522-0)                                        



[facility] GLUCOSE (AUTOMATED)2022 02:20:10





             Test Item    Value        Reference Range Interpretation Comments

 

             POCT GLU (test code = 9457173474) 281 mg/dL           H      

      

 

             Lab Interpretation (test code = Abnormal                           

    



             30387-5)                                            



[facility] GLUCOSE (AUTOMATED)2022-08-10 22:27:37





             Test Item    Value        Reference Range Interpretation Comments

 

             POCT GLU (test code = 8166307807) 187 mg/dL           H      

      

 

             Lab Interpretation (test code = Abnormal                           

    



             89449-2)                                            



[facility] GLUCOSE (AUTOMATED)2022-08-10 19:00:16





             Test Item    Value        Reference Range Interpretation Comments

 

             POCT GLU (test code = 6468141345) 167 mg/dL           H      

      

 

             Lab Interpretation (test code = Abnormal                           

    



             77992-4)                                            



[facility] GLUCOSE (AUTOMATED)2022-08-10 19:00:16





             Test Item    Value        Reference Range Interpretation Comments

 

             POCT GLU (test code = 7425899603) 167 mg/dL           H      

      

 

             Lab Interpretation (test code = Abnormal                           

    



             32443-4)                                            



[facility] GLUCOSE (AUTOMATED)2022-08-10 15:22:04





             Test Item    Value        Reference Range Interpretation Comments

 

             POCT GLU (test code = 2129620243) 138 mg/dL           H      

      

 

             Lab Interpretation (test code = Abnormal                           

    



             08269-8)                                            



[facility] GLUCOSE (AUTOMATED)2022-08-10 15:22:04





             Test Item    Value        Reference Range Interpretation Comments

 

             POCT GLU (test code = 8540146884) 138 mg/dL           H      

      

 

             Lab Interpretation (test code = Abnormal                           

    



             75168-7)                                            



[facility] GLUCOSE (AUTOMATED)2022-08-10 02:45:31





             Test Item    Value        Reference Range Interpretation Comments

 

             POCT GLU (test code = 5660328647) 142 mg/dL           H      

      

 

             Lab Interpretation (test code = Abnormal                           

    



             74261-3)                                            



[facility] GLUCOSE (AUTOMATED)2022-08-10 02:45:31





             Test Item    Value        Reference Range Interpretation Comments

 

             POCT GLU (test code = 6022345086) 142 mg/dL           H      

      

 

             Lab Interpretation (test code = Abnormal                           

    



             23724-0)                                            



[facility] GLUCOSE (AUTOMATED)2022 22:04:41





             Test Item    Value        Reference Range Interpretation Comments

 

             POCT GLU (test code = 4581707207) 260 mg/dL           H      

      

 

             Lab Interpretation (test code = Abnormal                           

    



             27921-4)                                            



[facility] GLUCOSE (AUTOMATED)2022 22:04:41





             Test Item    Value        Reference Range Interpretation Comments

 

             POCT GLU (test code = 5130543042) 260 mg/dL           H      

      

 

             Lab Interpretation (test code = Abnormal                           

    



             70335-4)                                            



[facility]  
21:01:35





             Test Item    Value        Reference Range Interpretation Comments

 

             Blood Culture-Aerobic No organisms No growth                 Previo

us



             (test code = 17928-3) isolated                               prelim

inary



                                                                 verified result



                                                                 was Culture In



                                                                 Progress on



                                                                 2022 at 190

1



                                                                 CDTPrevious



                                                                 preliminary



                                                                 verified result



                                                                 was No growth a

t



                                                                 24 hours on



                                                                 2022 at 160

1



                                                                 CDTPrevious



                                                                 preliminary



                                                                 verified result



                                                                 was No growth a

t



                                                                 48 hours on



                                                                 2022 at 160

1



                                                                 CDTPrevious



                                                                 preliminary



                                                                 verified result



                                                                 was No growth a

t



                                                                 72 hours on



                                                                 2022 at 160

1



                                                                 CDT

 

             Blood        No organisms No growth                 Previous



             Culture-Anaerobic isolated                               preliminar

y



             (test code = 77455-3)                                        verifi

ed result



                                                                 was Culture In



                                                                 Progress on



                                                                 2022 at 190

1



                                                                 CDTPrevious



                                                                 preliminary



                                                                 verified result



                                                                 was No growth a

t



                                                                 24 hours on



                                                                 2022 at 160

1



                                                                 CDTPrevious



                                                                 preliminary



                                                                 verified result



                                                                 was No growth a

t



                                                                 48 hours on



                                                                 2022 at 160

1



                                                                 CDTPrevious



                                                                 preliminary



                                                                 verified result



                                                                 was No growth a

t



                                                                 72 hours on



                                                                 2022 at 160

1



                                                                 CDT

 

             Lab Interpretation Normal                                 



             (test code = 72091-0)                                        



[facility] Culture - Peripheral [id number] 
21:01:35





             Test Item    Value        Reference Range Interpretation Comments

 

             Blood Culture-Aerobic No organisms No growth                 Previo

us



             (test code = 17928-3) isolated                               prelim

inary



                                                                 verified result



                                                                 was Culture In



                                                                 Progress on



                                                                 2022 at 190

1



                                                                 CDTPrevious



                                                                 preliminary



                                                                 verified result



                                                                 was No growth a

t



                                                                 24 hours on



                                                                 2022 at 160

1



                                                                 CDTPrevious



                                                                 preliminary



                                                                 verified result



                                                                 was No growth a

t



                                                                 48 hours on



                                                                 2022 at 160

1



                                                                 CDTPrevious



                                                                 preliminary



                                                                 verified result



                                                                 was No growth a

t



                                                                 72 hours on



                                                                 2022 at 160

1



                                                                 CDT

 

             Blood        No organisms No growth                 Previous



             Culture-Anaerobic isolated                               preliminar

y



             (test code = 09263-1)                                        verifi

ed result



                                                                 was Culture In



                                                                 Progress on



                                                                 2022 at 190

1



                                                                 CDTPrevious



                                                                 preliminary



                                                                 verified result



                                                                 was No growth a

t



                                                                 24 hours on



                                                                 2022 at 160

1



                                                                 CDTPrevious



                                                                 preliminary



                                                                 verified result



                                                                 was No growth a

t



                                                                 48 hours on



                                                                 2022 at 160

1



                                                                 CDTPrevious



                                                                 preliminary



                                                                 verified result



                                                                 was No growth a

t



                                                                 72 hours on



                                                                 2022 at 160

1



                                                                 CDT

 

             Lab Interpretation Normal                                 



             (test code = 30864-2)                                        



[facility] Culture - Peripheral Vein #  
21:01:35





             Test Item    Value        Reference Range Interpretation Comments

 

             Blood Culture-Aerobic No organisms No growth                 Previo

us



             (test code = 17928-3) isolated                               prelim

inary



                                                                 verified result



                                                                 was Culture In



                                                                 Progress on



                                                                 2022 at 190

1



                                                                 CDTPrevious



                                                                 preliminary



                                                                 verified result



                                                                 was No growth a

t



                                                                 24 hours on



                                                                 2022 at 160

1



                                                                 CDTPrevious



                                                                 preliminary



                                                                 verified result



                                                                 was No growth a

t



                                                                 48 hours on



                                                                 2022 at 160

1



                                                                 CDTPrevious



                                                                 preliminary



                                                                 verified result



                                                                 was No growth a

t



                                                                 72 hours on



                                                                 2022 at 160

1



                                                                 CDT

 

             Blood        No organisms No growth                 Previous



             Culture-Anaerobic isolated                               preliminar

y



             (test code = 42074-3)                                        verifi

ed result



                                                                 was Culture In



                                                                 Progress on



                                                                 2022 at 190

1



                                                                 CDTPrevious



                                                                 preliminary



                                                                 verified result



                                                                 was No growth a

t



                                                                 24 hours on



                                                                 2022 at 160

1



                                                                 CDTPrevious



                                                                 preliminary



                                                                 verified result



                                                                 was No growth a

t



                                                                 48 hours on



                                                                 2022 at 160

1



                                                                 CDTPrevious



                                                                 preliminary



                                                                 verified result



                                                                 was No growth a

t



                                                                 72 hours on



                                                                 2022 at 160

1



                                                                 CDT

 

             Lab Interpretation Normal                                 



             (test code = 75594-3)                                        



[id number] 
21:01:35





             Test Item    Value        Reference Range Interpretation Comments

 

             Blood Culture-Aerobic No organisms No growth                 Previo

us



             (test code = 17928-3) isolated                               prelim

inary



                                                                 verified result



                                                                 was Culture In



                                                                 Progress on



                                                                 2022 at 190

1



                                                                 CDTPrevious



                                                                 preliminary



                                                                 verified result



                                                                 was No growth a

t



                                                                 24 hours on



                                                                 2022 at 160

1



                                                                 CDTPrevious



                                                                 preliminary



                                                                 verified result



                                                                 was No growth a

t



                                                                 48 hours on



                                                                 2022 at 160

1



                                                                 CDTPrevious



                                                                 preliminary



                                                                 verified result



                                                                 was No growth a

t



                                                                 72 hours on



                                                                 2022 at 160

1



                                                                 CDT

 

             Blood        No organisms No growth                 Previous



             Culture-Anaerobic isolated                               preliminar

y



             (test code = 98198-3)                                        verifi

ed result



                                                                 was Culture In



                                                                 Progress on



                                                                 2022 at 190

1



                                                                 CDTPrevious



                                                                 preliminary



                                                                 verified result



                                                                 was No growth a

t



                                                                 24 hours on



                                                                 2022 at 160

1



                                                                 CDTPrevious



                                                                 preliminary



                                                                 verified result



                                                                 was No growth a

t



                                                                 48 hours on



                                                                 2022 at 160

1



                                                                 CDTPrevious



                                                                 preliminary



                                                                 verified result



                                                                 was No growth a

t



                                                                 72 hours on



                                                                 2022 at 160

1



                                                                 CDT

 

             Lab Interpretation Normal                                 



             (test code = 10059-7)                                        



[facility] GLUCOSE (AUTOMATED)2022 14:13:49





             Test Item    Value        Reference Range Interpretation Comments

 

             POCT GLU (test code = 2558353682) 176 mg/dL           H      

      

 

             Lab Interpretation (test code = Abnormal                           

    



             11852-7)                                            



[facility] GLUCOSE (AUTOMATED)2022 14:13:49





             Test Item    Value        Reference Range Interpretation Comments

 

             POCT GLU (test code = 9565258338) 176 mg/dL           H      

      

 

             Lab Interpretation (test code = Abnormal                           

    



             09771-0)                                            



[facility] GLUCOSE (AUTOMATED)2022 02:30:54





             Test Item    Value        Reference Range Interpretation Comments

 

             POCT GLU (test code = 4671247638) 113 mg/dL           H      

      

 

             Lab Interpretation (test code = Abnormal                           

    



             54447-2)                                            



[facility] GLUCOSE (AUTOMATED)2022 02:30:54





             Test Item    Value        Reference Range Interpretation Comments

 

             POCT GLU (test code = 5774865804) 113 mg/dL           H      

      

 

             Lab Interpretation (test code = Abnormal                           

    



             63262-5)                                            



[facility] GLUCOSE (AUTOMATED)2022 23:41:12





             Test Item    Value        Reference Range Interpretation Comments

 

             POCT GLU (test code = 7747109409) 135 mg/dL           H      

      

 

             Lab Interpretation (test code = Abnormal                           

    



             59527-7)                                            



[facility] GLUCOSE (AUTOMATED)2022 23:41:12





             Test Item    Value        Reference Range Interpretation Comments

 

             POCT GLU (test code = 5476235361) 135 mg/dL           H      

      

 

             Lab Interpretation (test code = Abnormal                           

    



             89878-9)                                            



[facility] GLUCOSE (AUTOMATED)2022 17:13:18





             Test Item    Value        Reference Range Interpretation Comments

 

             POCT GLU (test code = 1563398009) 238 mg/dL           H      

      

 

             Lab Interpretation (test code = Abnormal                           

    



             10256-1)                                            



[facility] GLUCOSE (AUTOMATED)2022 17:13:18





             Test Item    Value        Reference Range Interpretation Comments

 

             POCT GLU (test code = 9681730037) 238 mg/dL           H      

      

 

             Lab Interpretation (test code = Abnormal                           

    



             03677-6)                                            



[facility] GLUCOSE (AUTOMATED)2022 17:13:18





             Test Item    Value        Reference Range Interpretation Comments

 

             POCT GLU (test code = 3128752206) 238 mg/dL           H      

      

 

             Lab Interpretation (test code = Abnormal                           

    



             07791-4)                                            



[facility] [id number] 16:50:53





             Test Item    Value        Reference Range Interpretation Comments

 

             CRP (test code = 0.9 mg/dL    See_Comment  H             [Automated

 message]



             7248554784)                                         The system [employer]



                                                                 generated this



                                                                 result transmit

huma



                                                                 reference range

:



                                                                 <=0.8. The refe

rence



                                                                 range was not u

sed



                                                                 to interpret th

is



                                                                 result as



                                                                 normal/abnormal

.

 

             Lab Interpretation (test Abnormal                               



             code = 52910-9)                                        



[facility] [id number] 16:50:53





             Test Item    Value        Reference Range Interpretation Comments

 

             CRP (test code = 0.9 mg/dL    See_Comment  H             [Automated

 message]



             3451635379)                                         The system [employer]



                                                                 generated this



                                                                 result transmit

huma



                                                                 reference range

:



                                                                 <=0.8. The refe

rence



                                                                 range was not u

sed



                                                                 to interpret th

is



                                                                 result as



                                                                 normal/abnormal

.

 

             Lab Interpretation (test Abnormal                               



             code = 60800-9)                                        



[facility] [id number] 16:50:53





             Test Item    Value        Reference Range Interpretation Comments

 

             CRP (test code = 0.9 mg/dL    See_Comment  H             [Automated

 message]



             4255829864)                                         The system [employer]



                                                                 generated this



                                                                 result transmit

huma



                                                                 reference range

:



                                                                 <=0.8. The refe

rence



                                                                 range was not u

sed



                                                                 to interpret th

is



                                                                 result as



                                                                 normal/abnormal

.

 

             Lab Interpretation (test Abnormal                               



             code = 54358-8)                                        



[facility] GLUCOSE (AUTOMATED)2022 15:55:49





             Test Item    Value        Reference Range Interpretation Comments

 

             POCT GLU (test code = 4171356118) 209 mg/dL           H      

      

 

             Lab Interpretation (test code = Abnormal                           

    



             81009-8)                                            



[facility] GLUCOSE (AUTOMATED)2022 15:55:49





             Test Item    Value        Reference Range Interpretation Comments

 

             POCT GLU (test code = 3169733671) 209 mg/dL           H      

      

 

             Lab Interpretation (test code = Abnormal                           

    



             40981-4)                                            



[facility] GLUCOSE (AUTOMATED)2022 00:58:44





             Test Item    Value        Reference Range Interpretation Comments

 

             POCT GLU (test code = 9291017193) 300 mg/dL           H      

      

 

             Lab Interpretation (test code = Abnormal                           

    



             61846-4)                                            



[facility] GLUCOSE (AUTOMATED)2022 00:58:44





             Test Item    Value        Reference Range Interpretation Comments

 

             POCT GLU (test code = 8196094395) 300 mg/dL           H      

      

 

             Lab Interpretation (test code = Abnormal                           

    



             33990-0)                                            



[facility] GLUCOSE (AUTOMATED)2022 21:28:03





             Test Item    Value        Reference Range Interpretation Comments

 

             POCT GLU (test code = 6195745791) 119 mg/dL           H      

      

 

             Lab Interpretation (test code = Abnormal                           

    



             40373-5)                                            



[facility] GLUCOSE (AUTOMATED)2022 21:28:03





             Test Item    Value        Reference Range Interpretation Comments

 

             POCT GLU (test code = 0756514571) 119 mg/dL           H      

      

 

             Lab Interpretation (test code = Abnormal                           

    



             97126-9)                                            



[facility] GLUCOSE (AUTOMATED)2022 16:51:05





             Test Item    Value        Reference Range Interpretation Comments

 

             POCT GLU (test code = 2235456967) 275 mg/dL           H      

      

 

             Lab Interpretation (test code = Abnormal                           

    



             02466-2)                                            



[facility] GLUCOSE (AUTOMATED)2022 16:51:05





             Test Item    Value        Reference Range Interpretation Comments

 

             POCT GLU (test code = 8589220396) 275 mg/dL           H      

      

 

             Lab Interpretation (test code = Abnormal                           

    



             48733-8)                                            



[facility] METABOLIC PANEL (NA, K, CL, CO2, 
GLUCOSE, BUN, CREATININE, CA)2022 15:51:39





             Test Item    Value        Reference Range Interpretation Comments

 

             NA (test code = 136 mmol/L   135-145                   



             8654546305)                                         

 

             K (test code = 3.7 mmol/L   3.5-5                     



             5521939624)                                         

 

             CL (test code = 111 mmol/L          H            



             1020720390)                                         

 

             CO2 TOTAL (test code = 21 mmol/L    23-31        L            



             1454869193)                                         

 

             AGAP (test code =              2-16                      



             4322737029)                                         

 

             BUN (test code = 9 mg/dL      7-23                      



             5651568515)                                         

 

             GLUCOSE (test code = 278 mg/dL           H            



             2108934418)                                         

 

             CREATININE (test code = 0.46 mg/dL   0.6-1.25     L            



             8893500896)                                         

 

             CALCIUM (test code = 8.2 mg/dL    8.6-10.6     L            



             7415002534)                                         

 

             eGFR (test code =              mL/min/1.73m2              



             1995501325)                                         

 

             MAL (test code = MAL) Association of                           



                          Glomerular Filtration                           



                          Rate (GFR) and Staging                           



                          of Kidney Disease*                           



                          +---------------------                           



                          --+-------------------                           



                          --+-------------------                           



                          ------+| GFR                           



                          (mL/min/1.73 m2) ?|                           



                          With Kidney Damage ?|                           



                          ?Without Kidney                           



                          Damage+---------------                           



                          --------+-------------                           



                          --------+-------------                           



                          ------------+| ?>90 ?                           



                          ? ? ? ? ? ? ? ?|                           



                          ?Stage one ? ? ? ? ?|                           



                          ? Normal ? ? ? ? ? ? ?                           



                          ?+--------------------                           



                          ---+------------------                           



                          ---+------------------                           



                          -------+| ?60-89 ? ? ?                           



                          ? ? ? ? ?| ?Stage two                           



                          ? ? ? ? ?| ? Decreased                           



                          GFR ? ? ? ?                            



                          +---------------------                           



                          --+-------------------                           



                          --+-------------------                           



                          ------+| ?30-59 ? ? ?                           



                          ? ? ? ? ?| ?Stage                           



                          three ? ? ? ?| ? Stage                           



                          three ? ? ? ? ?                           



                          +---------------------                           



                          --+-------------------                           



                          --+-------------------                           



                          ------+| ?15-29 ? ? ?                           



                          ? ? ? ? ?| ?Stage four                           



                          ? ? ? ? | ? Stage four                           



                          ? ? ? ? ?                              



                          ?+--------------------                           



                          ---+------------------                           



                          ---+------------------                           



                          -------+| ?<15 (or                           



                          dialysis) ? ?| ?Stage                           



                          five ? ? ? ? | ? Stage                           



                          five ? ? ? ? ?                           



                          ?+--------------------                           



                          ---+------------------                           



                          ---+------------------                           



                          -------+ *Each stage                           



                          assumes the associated                           



                          GFR level has been in                           



                          effect for at least                           



                          three months. ?Stages                           



                          1 to 5, with or                           



                          without kidney                           



                          disease, indicate                           



                          chronic kidney                           



                          disease. Notes:                           



                          Determination of                           



                          stages one and two                           



                          (with eGFR                             



                          >59mL/min/1.73 m2)                           



                          requires estimation of                           



                          kidney damage for at                           



                          least three months as                           



                          defined by structural                           



                          or functional                           



                          abnormalities of the                           



                          kidney, manifested by                           



                          either:Pathological                           



                          abnormalities or                           



                          Markers of kidney                           



                          damage (including                           



                          abnormalities in the                           



                          composition of the                           



                          blood or urine or                           



                          abnormalities in                           



                          imaging tests).                           

 

             Lab Interpretation Abnormal                               



             (test code = 30990-5)                                        



[facility] METABOLIC PANEL (NA, K, CL, CO2, 
GLUCOSE, BUN, CREATININE, CA)2022 15:51:39





             Test Item    Value        Reference Range Interpretation Comments

 

             NA (test code = 136 mmol/L   135-145                   



             [id number])                                         

 

             K (test code = 3.7 mmol/L   3.5-5                     



             1589159397)                                         

 

             CL (test code = 111 mmol/L          H            



             7382579088)                                         

 

             CO2 TOTAL (test code = 21 mmol/L    23-31        L            



             6657236839)                                         

 

             AGAP (test code =              2-16                      



             9950471519)                                         

 

             BUN (test code = 9 mg/dL      7-23                      



             0284594460)                                         

 

             GLUCOSE (test code = 278 mg/dL           H            



             9550907949)                                         

 

             CREATININE (test code = 0.46 mg/dL   0.6-1.25     L            



             9714234921)                                         

 

             CALCIUM (test code = 8.2 mg/dL    8.6-10.6     L            



             0303159659)                                         

 

             eGFR (test code =              mL/min/1.73m2              



             4300390105)                                         

 

             MAL (test code = MAL) Association of                           



                          Glomerular Filtration                           



                          Rate (GFR) and Staging                           



                          of Kidney Disease*                           



                          +---------------------                           



                          --+-------------------                           



                          --+-------------------                           



                          ------+| GFR                           



                          (mL/min/1.73 m2) ?|                           



                          With Kidney Damage ?|                           



                          ?Without Kidney                           



                          Damage+---------------                           



                          --------+-------------                           



                          --------+-------------                           



                          ------------+| ?>90 ?                           



                          ? ? ? ? ? ? ? ?|                           



                          ?Stage one ? ? ? ? ?|                           



                          ? Normal ? ? ? ? ? ? ?                           



                          ?+--------------------                           



                          ---+------------------                           



                          ---+------------------                           



                          -------+| ?60-89 ? ? ?                           



                          ? ? ? ? ?| ?Stage two                           



                          ? ? ? ? ?| ? Decreased                           



                          GFR ? ? ? ?                            



                          +---------------------                           



                          --+-------------------                           



                          --+-------------------                           



                          ------+| ?30-59 ? ? ?                           



                          ? ? ? ? ?| ?Stage                           



                          three ? ? ? ?| ? Stage                           



                          three ? ? ? ? ?                           



                          +---------------------                           



                          --+-------------------                           



                          --+-------------------                           



                          ------+| ?15-29 ? ? ?                           



                          ? ? ? ? ?| ?Stage four                           



                          ? ? ? ? | ? Stage four                           



                          ? ? ? ? ?                              



                          ?+--------------------                           



                          ---+------------------                           



                          ---+------------------                           



                          -------+| ?<15 (or                           



                          dialysis) ? ?| ?Stage                           



                          five ? ? ? ? | ? Stage                           



                          five ? ? ? ? ?                           



                          ?+--------------------                           



                          ---+------------------                           



                          ---+------------------                           



                          -------+ *Each stage                           



                          assumes the associated                           



                          GFR level has been in                           



                          effect for at least                           



                          three months. ?Stages                           



                          1 to 5, with or                           



                          without kidney                           



                          disease, indicate                           



                          chronic kidney                           



                          disease. Notes:                           



                          Determination of                           



                          stages one and two                           



                          (with eGFR                             



                          >59mL/min/1.73 m2)                           



                          requires estimation of                           



                          kidney damage for at                           



                          least three months as                           



                          defined by structural                           



                          or functional                           



                          abnormalities of the                           



                          kidney, manifested by                           



                          either:Pathological                           



                          abnormalities or                           



                          Markers of kidney                           



                          damage (including                           



                          abnormalities in the                           



                          composition of the                           



                          blood or urine or                           



                          abnormalities in                           



                          imaging tests).                           

 

             Lab Interpretation Abnormal                               



             (test code = 00429-7)                                        



[facility] GLUCOSE (AUTOMATED)2022 12:36:29





             Test Item    Value        Reference Range Interpretation Comments

 

             POCT GLU (test code = 9512765256) 276 mg/dL           H      

      

 

             Lab Interpretation (test code = Abnormal                           

    



             68366-1)                                            



[facility] GLUCOSE (AUTOMATED)2022 12:36:29





             Test Item    Value        Reference Range Interpretation Comments

 

             POCT GLU (test code = 0171783545) 276 mg/dL           H      

      

 

             Lab Interpretation (test code = Abnormal                           

    



             21362-8)                                            



[facility] GLUCOSE (AUTOMATED)2022 01:29:51





             Test Item    Value        Reference Range Interpretation Comments

 

             POCT GLU (test code = 3300720575) 289 mg/dL           H      

      

 

             Lab Interpretation (test code = Abnormal                           

    



             36276-0)                                            



[facility] GLUCOSE (AUTOMATED)2022 01:29:51





             Test Item    Value        Reference Range Interpretation Comments

 

             POCT GLU (test code = 3958191333) 289 mg/dL           H      

      

 

             Lab Interpretation (test code = Abnormal                           

    



             70851-1)                                            



[facility] GLUCOSE (AUTOMATED)2022 21:24:38





             Test Item    Value        Reference Range Interpretation Comments

 

             POCT GLU (test code = 2741576507) 173 mg/dL           H      

      

 

             Lab Interpretation (test code = Abnormal                           

    



             09789-8)                                            



[facility] GLUCOSE (AUTOMATED)2022 21:24:38





             Test Item    Value        Reference Range Interpretation Comments

 

             POCT GLU (test code = 8732138946) 173 mg/dL           H      

      

 

             Lab Interpretation (test code = Abnormal                           

    



             52382-3)                                            



[facility] GLUCOSE (AUTOMATED)2022 16:50:49





             Test Item    Value        Reference Range Interpretation Comments

 

             POCT GLU (test code = 6164879859) 279 mg/dL           H      

      

 

             Lab Interpretation (test code = Abnormal                           

    



             66307-3)                                            



[facility]:50:49





             Test Item    Value        Reference Range Interpretation Comments

 

             POCT GLU (test code = 9182835810) 279 mg/dL           H      

      

 

             Lab Interpretation (test code = Abnormal                           

    



             12325-6)                                            



[facility] GLUCOSE (AUTOMATED)2022 13:31:23





             Test Item    Value        Reference Range Interpretation Comments

 

             POCT GLU (test code = 9413140543) 281 mg/dL           H      

      

 

             Lab Interpretation (test code = Abnormal                           

    



             02166-7)                                            



[facility] GLUCOSE (AUTOMATED)2022 13:31:23





             Test Item    Value        Reference Range Interpretation Comments

 

             POCT GLU (test code = 3170212000) 281 mg/dL           H      

      

 

             Lab Interpretation (test code = Abnormal                           

    



             23844-4)                                            



[facility] GLUCOSE (AUTOMATED)2022 10:02:35





             Test Item    Value        Reference Range Interpretation Comments

 

             POCT GLU (test code = 0235884564) 339 mg/dL           H      

      

 

             Lab Interpretation (test code = Abnormal                           

    



             11313-8)                                            



[facility] GLUCOSE (AUTOMATED)2022 10:02:35





             Test Item    Value        Reference Range Interpretation Comments

 

             POCT GLU (test code = 0774973480) 339 mg/dL           H      

      

 

             Lab Interpretation (test code = Abnormal                           

    



             93892-3)                                            



[facility] GLUCOSE (AUTOMATED)2022 06:33:22





             Test Item    Value        Reference Range Interpretation Comments

 

             POCT GLU (test code = 8656582566) 295 mg/dL           H      

      

 

             Lab Interpretation (test code = Abnormal                           

    



             02639-2)                                            



[facility] GLUCOSE (AUTOMATED)2022 06:33:22





             Test Item    Value        Reference Range Interpretation Comments

 

             POCT GLU (test code = 0247901639) 295 mg/dL           H      

      

 

             Lab Interpretation (test code = Abnormal                           

    



             80210-4)                                            



[facility] GLUCOSE (AUTOMATED)2022 01:54:18





             Test Item    Value        Reference Range Interpretation Comments

 

             POCT GLU (test code = 8504936281) 258 mg/dL           H      

      

 

             Lab Interpretation (test code = Abnormal                           

    



             75873-1)                                            



[facility] GLUCOSE (AUTOMATED)2022 01:54:18





             Test Item    Value        Reference Range Interpretation Comments

 

             POCT GLU (test code = 2539903096) 258 mg/dL           H      

      

 

             Lab Interpretation (test code = Abnormal                           

    



             08863-5)                                            



[facility] GLUCOSE (AUTOMATED)2022 23:03:09





             Test Item    Value        Reference Range Interpretation Comments

 

             POCT GLU (test code = 9762495801) 286 mg/dL           H      

      

 

             Lab Interpretation (test code = Abnormal                           

    



             85975-6)                                            



[facility] GLUCOSE (AUTOMATED)2022 23:03:09





             Test Item    Value        Reference Range Interpretation Comments

 

             POCT GLU (test code = 6225645758) 286 mg/dL           H      

      

 

             Lab Interpretation (test code = Abnormal                           

    



             89516-9)                                            



[facility] GLUCOSE (AUTOMATED)2022 20:48:08





             Test Item    Value        Reference Range Interpretation Comments

 

             POCT GLU (test code = 1946841893) 249 mg/dL           H      

      

 

             Lab Interpretation (test code = Abnormal                           

    



             30419-6)                                            



[facility] GLUCOSE (AUTOMATED)2022 20:48:08





             Test Item    Value        Reference Range Interpretation Comments

 

             POCT GLU (test code = 8968367888) 249 mg/dL           H      

      

 

             Lab Interpretation (test code = Abnormal                           

    



             05742-4)                                            



[id number] 17:01:47





             Test Item    Value        Reference Range Interpretation Comments

 

             BOH (test code = 1.0 mmol/L                             



             0536010448)                                         

 

             MAL (test code = Normal Ranges: ? ?                           



             MAL)         Nonfasting ? ? ? ? ? Less                           



                          than 0.1 mmol/L ? ?                           



                          Overnight Fast ? ? ? Less                           



                          than 0.4 mmol/L ? ? Fasting                           



                          (1-2 weeks) ?6-8 mmol/L Test                          

 



                          developed and                           



                          characteristics determined                           



                          by [facility].                          

 



[id number] 17:01:47





             Test Item    Value        Reference Range Interpretation Comments

 

             BOH (test code = 1.0 mmol/L                             



             7469786047)                                         

 

             MAL (test code = Normal Ranges: ? ?                           



             MAL)         Nonfasting ? ? ? ? ? Less                           



                          than 0.1 mmol/L ? ?                           



                          Overnight Fast ? ? ? Less                           



                          than 0.4 mmol/L ? ? Fasting                           



                          (1-2 weeks) ?6-8 mmol/L Test                          

 



                          developed and                           



                          characteristics determined                           



                          by [facility].                          

 



[id number] 17:01:47





             Test Item    Value        Reference Range Interpretation Comments

 

             BOH (test code = 1.0 mmol/L                             



             1252542379)                                         

 

             MAL (test code = Normal Ranges: ? ?                           



             MAL)         Nonfasting ? ? ? ? ? Less                           



                          than 0.1 mmol/L ? ?                           



                          Overnight Fast ? ? ? Less                           



                          than 0.4 mmol/L ? ? Fasting                           



                          (1-2 weeks) ?6-8 mmol/L Test                          

 



                          developed and                           



                          characteristics determined                           



                          by [facility].                          

 



[facility] GLUCOSE (AUTOMATED)2022 16:42:59





             Test Item    Value        Reference Range Interpretation Comments

 

             POCT GLU (test code = 0957185908) 264 mg/dL           H      

      

 

             Lab Interpretation (test code = Abnormal                           

    



             68133-4)                                            



[facility] GLUCOSE (AUTOMATED)2022 16:42:59





             Test Item    Value        Reference Range Interpretation Comments

 

             POCT GLU (test code = 8426264532) 264 mg/dL           H      

      

 

             Lab Interpretation (test code = Abnormal                           

    



             77766-5)                                            



[facility] Metabolic Panel (Na, K, Cl, CO2, 
Glucose, BUN, Creatinine, Ca)2022 16:16:20





             Test Item    Value        Reference Range Interpretation Comments

 

             NA (test code = 137 mmol/L   135-145                   



             [id number])                                         

 

             K (test code = 3.5 mmol/L   3.5-5                     



             6606661254)                                         

 

             CL (test code = 115 mmol/L          H            



             9822214056)                                         

 

             CO2 TOTAL (test code = 17 mmol/L    23-31        L            



             0942441001)                                         

 

             AGAP (test code =              2-16                      



             1191999442)                                         

 

             BUN (test code = 5 mg/dL      7-23         L            



             9108372721)                                         

 

             GLUCOSE (test code = 271 mg/dL           H            



             [id number])                                         

 

             CREATININE (test code = 0.45 mg/dL   0.6-1.25     L            



             7987934374)                                         

 

             CALCIUM (test code = 8.5 mg/dL    8.6-10.6     L            



             3996569425)                                         

 

             eGFR (test code =              mL/min/1.73m2              



             1094344793)                                         

 

             MAL (test code = MAL) Association of                           



                          Glomerular Filtration                           



                          Rate (GFR) and Staging                           



                          of Kidney Disease*                           



                          +---------------------                           



                          --+-------------------                           



                          --+-------------------                           



                          ------+| GFR                           



                          (mL/min/1.73 m2) ?|                           



                          With Kidney Damage ?|                           



                          ?Without Kidney                           



                          Damage+---------------                           



                          --------+-------------                           



                          --------+-------------                           



                          ------------+| ?>90 ?                           



                          ? ? ? ? ? ? ? ?|                           



                          ?Stage one ? ? ? ? ?|                           



                          ? Normal ? ? ? ? ? ? ?                           



                          ?+--------------------                           



                          ---+------------------                           



                          ---+------------------                           



                          -------+| ?60-89 ? ? ?                           



                          ? ? ? ? ?| ?Stage two                           



                          ? ? ? ? ?| ? Decreased                           



                          GFR ? ? ? ?                            



                          +---------------------                           



                          --+-------------------                           



                          --+-------------------                           



                          ------+| ?30-59 ? ? ?                           



                          ? ? ? ? ?| ?Stage                           



                          three ? ? ? ?| ? Stage                           



                          three ? ? ? ? ?                           



                          +---------------------                           



                          --+-------------------                           



                          --+-------------------                           



                          ------+| ?15-29 ? ? ?                           



                          ? ? ? ? ?| ?Stage four                           



                          ? ? ? ? | ? Stage four                           



                          ? ? ? ? ?                              



                          ?+--------------------                           



                          ---+------------------                           



                          ---+------------------                           



                          -------+| ?<15 (or                           



                          dialysis) ? ?| ?Stage                           



                          five ? ? ? ? | ? Stage                           



                          five ? ? ? ? ?                           



                          ?+--------------------                           



                          ---+------------------                           



                          ---+------------------                           



                          -------+ *Each stage                           



                          assumes the associated                           



                          GFR level has been in                           



                          effect for at least                           



                          three months. ?Stages                           



                          1 to 5, with or                           



                          without kidney                           



                          disease, indicate                           



                          chronic kidney                           



                          disease. Notes:                           



                          Determination of                           



                          stages one and two                           



                          (with eGFR                             



                          >59mL/min/1.73 m2)                           



                          requires estimation of                           



                          kidney damage for at                           



                          least three months as                           



                          defined by structural                           



                          or functional                           



                          abnormalities of the                           



                          kidney, manifested by                           



                          either:Pathological                           



                          abnormalities or                           



                          Markers of kidney                           



                          damage (including                           



                          abnormalities in the                           



                          composition of the                           



                          blood or urine or                           



                          abnormalities in                           



                          imaging tests).                           

 

             Lab Interpretation Abnormal                               



             (test code = 44759-8)                                        



[facility] Metabolic Panel (Na, K, Cl, CO2, 
Glucose, BUN, Creatinine, Ca)2022 16:16:20





             Test Item    Value        Reference Range Interpretation Comments

 

             NA (test code = 137 mmol/L   135-145                   



             [id number])                                         

 

             K (test code = 3.5 mmol/L   3.5-5                     



             5910595549)                                         

 

             CL (test code = 115 mmol/L          H            



             2989059912)                                         

 

             CO2 TOTAL (test code = 17 mmol/L    23-31        L            



             6630969819)                                         

 

             AGAP (test code =              2-16                      



             4649517600)                                         

 

             BUN (test code = 5 mg/dL      7-23         L            



             4097485298)                                         

 

             GLUCOSE (test code = 271 mg/dL           H            



             4852984138)                                         

 

             CREATININE (test code = 0.45 mg/dL   0.6-1.25     L            



             4566203159)                                         

 

             CALCIUM (test code = 8.5 mg/dL    8.6-10.6     L            



             7987747439)                                         

 

             eGFR (test code =              mL/min/1.73m2              



             9214990878)                                         

 

             MAL (test code = MAL) Association of                           



                          Glomerular Filtration                           



                          Rate (GFR) and Staging                           



                          of Kidney Disease*                           



                          +---------------------                           



                          --+-------------------                           



                          --+-------------------                           



                          ------+| GFR                           



                          (mL/min/1.73 m2) ?|                           



                          With Kidney Damage ?|                           



                          ?Without Kidney                           



                          Damage+---------------                           



                          --------+-------------                           



                          --------+-------------                           



                          ------------+| ?>90 ?                           



                          ? ? ? ? ? ? ? ?|                           



                          ?Stage one ? ? ? ? ?|                           



                          ? Normal ? ? ? ? ? ? ?                           



                          ?+--------------------                           



                          ---+------------------                           



                          ---+------------------                           



                          -------+| ?60-89 ? ? ?                           



                          ? ? ? ? ?| ?Stage two                           



                          ? ? ? ? ?| ? Decreased                           



                          GFR ? ? ? ?                            



                          +---------------------                           



                          --+-------------------                           



                          --+-------------------                           



                          ------+| ?30-59 ? ? ?                           



                          ? ? ? ? ?| ?Stage                           



                          three ? ? ? ?| ? Stage                           



                          three ? ? ? ? ?                           



                          +---------------------                           



                          --+-------------------                           



                          --+-------------------                           



                          ------+| ?15-29 ? ? ?                           



                          ? ? ? ? ?| ?Stage four                           



                          ? ? ? ? | ? Stage four                           



                          ? ? ? ? ?                              



                          ?+--------------------                           



                          ---+------------------                           



                          ---+------------------                           



                          -------+| ?<15 (or                           



                          dialysis) ? ?| ?Stage                           



                          five ? ? ? ? | ? Stage                           



                          five ? ? ? ? ?                           



                          ?+--------------------                           



                          ---+------------------                           



                          ---+------------------                           



                          -------+ *Each stage                           



                          assumes the associated                           



                          GFR level has been in                           



                          effect for at least                           



                          three months. ?Stages                           



                          1 to 5, with or                           



                          without kidney                           



                          disease, indicate                           



                          chronic kidney                           



                          disease. Notes:                           



                          Determination of                           



                          stages one and two                           



                          (with eGFR                             



                          >59mL/min/1.73 m2)                           



                          requires estimation of                           



                          kidney damage for at                           



                          least three months as                           



                          defined by structural                           



                          or functional                           



                          abnormalities of the                           



                          kidney, manifested by                           



                          either:Pathological                           



                          abnormalities or                           



                          Markers of kidney                           



                          damage (including                           



                          abnormalities in the                           



                          composition of the                           



                          blood or urine or                           



                          abnormalities in                           



                          imaging tests).                           

 

             Lab Interpretation Abnormal                               



             (test code = 70364-1)                                        



[facility] GLUCOSE (AUTOMATED)2022 14:21:27





             Test Item    Value        Reference Range Interpretation Comments

 

             POCT GLU (test code = 2754160052) 286 mg/dL           H      

      

 

             Lab Interpretation (test code = Abnormal                           

    



             76550-5)                                            



[facility] GLUCOSE (AUTOMATED)2022 14:21:27





             Test Item    Value        Reference Range Interpretation Comments

 

             POCT GLU (test code = 9125010701) 286 mg/dL           H      

      

 

             Lab Interpretation (test code = Abnormal                           

    



             47720-4)                                            



[facility] 
[id number] 13:29:25





             Test Item    Value        Reference Range Interpretation Comments

 

             Coagulase Negative Positive     Negative, See A            



             Staphylococcus (test              Comment/Narrative              



             code = 15702-7)                                        

 

             MAL (test code = MAL)  Coagulase negative                          

 



                          Staphylococcus (CoNS)                           



                          detected by DNA probe.                           



                          ?CoNS often                            



                          contaminate blood                           



                          cultures from skin                           



                          colonization during                           



                          phlebotomy. ?Preferred                           



                          management is to                           



                          repeat blood cultures,                           



                          and monitor off                           



                          antibiotics.                           



                          ?Contamination is                           



                          suggested by culture                           



                          growth after 48 hours,                           



                          or growth in single                           



                          culture (i.e., one of                           



                          two sets). ?True                           



                          bacteremia is                           



                          suggested by the                           



                          fever, hypotension,                           



                          and leukocytosis that                           



                          are not explained by                           



                          an alternative                           



                          infection, or                           



                          indwelling foreign                           



                          devices that appear                           



                          infected (catheters,                           



                          lines, or prostheses).                           



                          Consider Infectious                           



                          Diseases consultation                           



                          if differentiation of                           



                          CoNS bacteremia from                           



                          contamination is                           



                          uncertain. If clinical                           



                          context suggests true                           



                          bacteremia, preferred                           



                          therapy is vancomycin.                           



                          Please contact the                           



                          Antimicrobial                           



                          Stewardship Program                           



                          with questions.Pager:                           



                          [phone] Testing                           



                          included eleven                           



                          identification and                           



                          three resistance                           



                          marker                                 



                          targets.______________                           



                          ______________________                           



                          ______________________                           



                          _____________                           

 

             Lab Interpretation Abnormal                               



             (test code = 75039-4)                                        



[facility] POSITIVE BLOOD PATHOGENS DNA 
[id number] 13:29:25





             Test Item    Value        Reference Range Interpretation Comments

 

             Coagulase Negative Positive     Negative, See A            



             Staphylococcus (test              Comment/Narrative              



             code = 31879-2)                                        

 

             MAL (test code = MAL)  Coagulase negative                          

 



                          Staphylococcus (CoNS)                           



                          detected by DNA probe.                           



                          ?CoNS often                            



                          contaminate blood                           



                          cultures from skin                           



                          colonization during                           



                          phlebotomy. ?Preferred                           



                          management is to                           



                          repeat blood cultures,                           



                          and monitor off                           



                          antibiotics.                           



                          ?Contamination is                           



                          suggested by culture                           



                          growth after 48 hours,                           



                          or growth in single                           



                          culture (i.e., one of                           



                          two sets). ?True                           



                          bacteremia is                           



                          suggested by the                           



                          fever, hypotension,                           



                          and leukocytosis that                           



                          are not explained by                           



                          an alternative                           



                          infection, or                           



                          indwelling foreign                           



                          devices that appear                           



                          infected (catheters,                           



                          lines, or prostheses).                           



                          Consider Infectious                           



                          Diseases consultation                           



                          if differentiation of                           



                          CoNS bacteremia from                           



                          contamination is                           



                          uncertain. If clinical                           



                          context suggests true                           



                          bacteremia, preferred                           



                          therapy is vancomycin.                           



                          Please contact the                           



                          Antimicrobial                           



                          Stewardship Program                           



                          with questions.Pager:                           



                          [phone] Testing                           



                          included eleven                           



                          identification and                           



                          three resistance                           



                          marker                                 



                          targets.______________                           



                          ______________________                           



                          ______________________                           



                          _____________                           

 

             Lab Interpretation Abnormal                               



             (test code = 64347-7)                                        



[facility] POSITIVE BLOOD PATHOGENS DNA 
[id number] 13:29:25





             Test Item    Value        Reference Range Interpretation Comments

 

             Coagulase Negative Positive     Negative, See A            



             Staphylococcus (test              Comment/Narrative              



             code = 39383-9)                                        

 

             MAL (test code = MAL)  Coagulase negative                          

 



                          Staphylococcus (CoNS)                           



                          detected by DNA probe.                           



                          ?CoNS often                            



                          contaminate blood                           



                          cultures from skin                           



                          colonization during                           



                          phlebotomy. ?Preferred                           



                          management is to                           



                          repeat blood cultures,                           



                          and monitor off                           



                          antibiotics.                           



                          ?Contamination is                           



                          suggested by culture                           



                          growth after 48 hours,                           



                          or growth in single                           



                          culture (i.e., one of                           



                          two sets). ?True                           



                          bacteremia is                           



                          suggested by the                           



                          fever, hypotension,                           



                          and leukocytosis that                           



                          are not explained by                           



                          an alternative                           



                          infection, or                           



                          indwelling foreign                           



                          devices that appear                           



                          infected (catheters,                           



                          lines, or prostheses).                           



                          Consider Infectious                           



                          Diseases consultation                           



                          if differentiation of                           



                          CoNS bacteremia from                           



                          contamination is                           



                          uncertain. If clinical                           



                          context suggests true                           



                          bacteremia, preferred                           



                          therapy is vancomycin.                           



                          Please contact the                           



                          Antimicrobial                           



                          Stewardship Program                           



                          with questions.Pager:                           



                          [phone] Testing                           



                          included eleven                           



                          identification and                           



                          three resistance                           



                          marker                                 



                          targets.______________                           



                          ______________________                           



                          ______________________                           



                          _____________                           

 

             Lab Interpretation Abnormal                               



             (test code = 86388-9)                                        



[facility] GLUCOSE (AUTOMATED)2022 13:03:22





             Test Item    Value        Reference Range Interpretation Comments

 

             POCT GLU (test code = 9023665060) 307 mg/dL           H      

      

 

             Lab Interpretation (test code = Abnormal                           

    



             32543-2)                                            



[facility] GLUCOSE (AUTOMATED)2022 13:03:22





             Test Item    Value        Reference Range Interpretation Comments

 

             POCT GLU (test code = 3948845579) 307 mg/dL           H      

      

 

             Lab Interpretation (test code = Abnormal                           

    



             13672-9)                                            



[facility] GLUCOSE (AUTOMATED)2022 09:05:19





             Test Item    Value        Reference Range Interpretation Comments

 

             POCT GLU (test code = 5922842982) 326 mg/dL           H      

      

 

             Lab Interpretation (test code = Abnormal                           

    



             72368-8)                                            



[facility] GLUCOSE (AUTOMATED)2022 09:05:19





             Test Item    Value        Reference Range Interpretation Comments

 

             POCT GLU (test code = 0135868869) 326 mg/dL           H      

      

 

             Lab Interpretation (test code = Abnormal                           

    



             49708-7)                                            



[facility] GLUCOSE (AUTOMATED)2022 05:46:58





             Test Item    Value        Reference Range Interpretation Comments

 

             POCT GLU (test code = 3704390007) 341 mg/dL           H      

      

 

             Lab Interpretation (test code = Abnormal                           

    



             02332-9)                                            



[facility] GLUCOSE (AUTOMATED)2022 05:46:58





             Test Item    Value        Reference Range Interpretation Comments

 

             POCT GLU (test code = 7967791667) 341 mg/dL           H      

      

 

             Lab Interpretation (test code = Abnormal                           

    



             72220-4)                                            



[facility] GLUCOSE (AUTOMATED)2022 01:37:20





             Test Item    Value        Reference Range Interpretation Comments

 

             POCT GLU (test code = 1225519318) 196 mg/dL           H      

      

 

             Lab Interpretation (test code = Abnormal                           

    



             70465-1)                                            



[facility] GLUCOSE (AUTOMATED)2022 01:37:20





             Test Item    Value        Reference Range Interpretation Comments

 

             POCT GLU (test code = 8565299680) 196 mg/dL           H      

      

 

             Lab Interpretation (test code = Abnormal                           

    



             45546-8)                                            



[facility] GLUCOSE (AUTOMATED)2022 23:44:41





             Test Item    Value        Reference Range Interpretation Comments

 

             POCT GLU (test code = 4890968025) 138 mg/dL           H      

      

 

             Lab Interpretation (test code = Abnormal                           

    



             75442-1)                                            



[facility] GLUCOSE (AUTOMATED)2022 23:44:41





             Test Item    Value        Reference Range Interpretation Comments

 

             POCT GLU (test code = 6202437381) 138 mg/dL           H      

      

 

             Lab Interpretation (test code = Abnormal                           

    



             24584-4)                                            



[facility] Metabolic Panel (Na, K, Cl, CO2, 
Glucose, BUN, Creatinine, Ca)2022 23:12:45





             Test Item    Value        Reference Range Interpretation Comments

 

             NA (test code = 138 mmol/L   135-145                   



             4713649498)                                         

 

             K (test code = 3.7 mmol/L   3.5-5                     



             0510017963)                                         

 

             CL (test code = 115 mmol/L          H            



             6058312769)                                         

 

             CO2 TOTAL (test code = 17 mmol/L    23-31        L            



             2125151373)                                         

 

             AGAP (test code =              2-16                      



             5714421517)                                         

 

             BUN (test code = 5 mg/dL      7-23         L            



             6553757178)                                         

 

             GLUCOSE (test code = 86 mg/dL                         



             7456888233)                                         

 

             CREATININE (test code = 0.46 mg/dL   0.6-1.25     L            



             2490524411)                                         

 

             CALCIUM (test code = 8.4 mg/dL    8.6-10.6     L            



             3721066755)                                         

 

             eGFR (test code =              mL/min/1.73m2              



             9718071361)                                         

 

             MAL (test code = MAL) Association of                           



                          Glomerular Filtration                           



                          Rate (GFR) and Staging                           



                          of Kidney Disease*                           



                          +---------------------                           



                          --+-------------------                           



                          --+-------------------                           



                          ------+| GFR                           



                          (mL/min/1.73 m2) ?|                           



                          With Kidney Damage ?|                           



                          ?Without Kidney                           



                          Damage+---------------                           



                          --------+-------------                           



                          --------+-------------                           



                          ------------+| ?>90 ?                           



                          ? ? ? ? ? ? ? ?|                           



                          ?Stage one ? ? ? ? ?|                           



                          ? Normal ? ? ? ? ? ? ?                           



                          ?+--------------------                           



                          ---+------------------                           



                          ---+------------------                           



                          -------+| ?60-89 ? ? ?                           



                          ? ? ? ? ?| ?Stage two                           



                          ? ? ? ? ?| ? Decreased                           



                          GFR ? ? ? ?                            



                          +---------------------                           



                          --+-------------------                           



                          --+-------------------                           



                          ------+| ?30-59 ? ? ?                           



                          ? ? ? ? ?| ?Stage                           



                          three ? ? ? ?| ? Stage                           



                          three ? ? ? ? ?                           



                          +---------------------                           



                          --+-------------------                           



                          --+-------------------                           



                          ------+| ?15-29 ? ? ?                           



                          ? ? ? ? ?| ?Stage four                           



                          ? ? ? ? | ? Stage four                           



                          ? ? ? ? ?                              



                          ?+--------------------                           



                          ---+------------------                           



                          ---+------------------                           



                          -------+| ?<15 (or                           



                          dialysis) ? ?| ?Stage                           



                          five ? ? ? ? | ? Stage                           



                          five ? ? ? ? ?                           



                          ?+--------------------                           



                          ---+------------------                           



                          ---+------------------                           



                          -------+ *Each stage                           



                          assumes the associated                           



                          GFR level has been in                           



                          effect for at least                           



                          three months. ?Stages                           



                          1 to 5, with or                           



                          without kidney                           



                          disease, indicate                           



                          chronic kidney                           



                          disease. Notes:                           



                          Determination of                           



                          stages one and two                           



                          (with eGFR                             



                          >59mL/min/1.73 m2)                           



                          requires estimation of                           



                          kidney damage for at                           



                          least three months as                           



                          defined by structural                           



                          or functional                           



                          abnormalities of the                           



                          kidney, manifested by                           



                          either:Pathological                           



                          abnormalities or                           



                          Markers of kidney                           



                          damage (including                           



                          abnormalities in the                           



                          composition of the                           



                          blood or urine or                           



                          abnormalities in                           



                          imaging tests).                           

 

             Lab Interpretation Abnormal                               



             (test code = 35700-7)                                        



[facility] Metabolic Panel (Na, K, Cl, CO2, 
Glucose, BUN, Creatinine, Ca)2022 23:12:45





             Test Item    Value        Reference Range Interpretation Comments

 

             NA (test code = 138 mmol/L   135-145                   



             5925597490)                                         

 

             K (test code = 3.7 mmol/L   3.5-5                     



             2735912438)                                         

 

             CL (test code = 115 mmol/L          H            



             6491874010)                                         

 

             CO2 TOTAL (test code = 17 mmol/L    23-31        L            



             9996277555)                                         

 

             AGAP (test code =              2-16                      



             1520130667)                                         

 

             BUN (test code = 5 mg/dL      7-23         L            



             3799640271)                                         

 

             GLUCOSE (test code = 86 mg/dL                         



             2954339854)                                         

 

             CREATININE (test code = 0.46 mg/dL   0.6-1.25     L            



             4334190213)                                         

 

             CALCIUM (test code = 8.4 mg/dL    8.6-10.6     L            



             0271599375)                                         

 

             eGFR (test code =              mL/min/1.73m2              



             2111686908)                                         

 

             MAL (test code = MAL) Association of                           



                          Glomerular Filtration                           



                          Rate (GFR) and Staging                           



                          of Kidney Disease*                           



                          +---------------------                           



                          --+-------------------                           



                          --+-------------------                           



                          ------+| GFR                           



                          (mL/min/1.73 m2) ?|                           



                          With Kidney Damage ?|                           



                          ?Without Kidney                           



                          Damage+---------------                           



                          --------+-------------                           



                          --------+-------------                           



                          ------------+| ?>90 ?                           



                          ? ? ? ? ? ? ? ?|                           



                          ?Stage one ? ? ? ? ?|                           



                          ? Normal ? ? ? ? ? ? ?                           



                          ?+--------------------                           



                          ---+------------------                           



                          ---+------------------                           



                          -------+| ?60-89 ? ? ?                           



                          ? ? ? ? ?| ?Stage two                           



                          ? ? ? ? ?| ? Decreased                           



                          GFR ? ? ? ?                            



                          +---------------------                           



                          --+-------------------                           



                          --+-------------------                           



                          ------+| ?30-59 ? ? ?                           



                          ? ? ? ? ?| ?Stage                           



                          three ? ? ? ?| ? Stage                           



                          three ? ? ? ? ?                           



                          +---------------------                           



                          --+-------------------                           



                          --+-------------------                           



                          ------+| ?15-29 ? ? ?                           



                          ? ? ? ? ?| ?Stage four                           



                          ? ? ? ? | ? Stage four                           



                          ? ? ? ? ?                              



                          ?+--------------------                           



                          ---+------------------                           



                          ---+------------------                           



                          -------+| ?<15 (or                           



                          dialysis) ? ?| ?Stage                           



                          five ? ? ? ? | ? Stage                           



                          five ? ? ? ? ?                           



                          ?+--------------------                           



                          ---+------------------                           



                          ---+------------------                           



                          -------+ *Each stage                           



                          assumes the associated                           



                          GFR level has been in                           



                          effect for at least                           



                          three months. ?Stages                           



                          1 to 5, with or                           



                          without kidney                           



                          disease, indicate                           



                          chronic kidney                           



                          disease. Notes:                           



                          Determination of                           



                          stages one and two                           



                          (with eGFR                             



                          >59mL/min/1.73 m2)                           



                          requires estimation of                           



                          kidney damage for at                           



                          least three months as                           



                          defined by structural                           



                          or functional                           



                          abnormalities of the                           



                          kidney, manifested by                           



                          either:Pathological                           



                          abnormalities or                           



                          Markers of kidney                           



                          damage (including                           



                          abnormalities in the                           



                          composition of the                           



                          blood or urine or                           



                          abnormalities in                           



                          imaging tests).                           

 

             Lab Interpretation Abnormal                               



             (test code = 83115-6)                                        



[facility] GLUCOSE (AUTOMATED)2022 22:38:52





             Test Item    Value        Reference Range Interpretation Comments

 

             POCT GLU (test code = 6803797859) 94 mg/dL                   

      

 

             Lab Interpretation (test code = Normal                             

    



             08554-3)                                            



[facility] GLUCOSE (AUTOMATED)2022 22:38:52





             Test Item    Value        Reference Range Interpretation Comments

 

             POCT GLU (test code = 7660218375) 94 mg/dL                   

      

 

             Lab Interpretation (test code = Normal                             

    



             68131-6)                                            



[facility] GLUCOSE (AUTOMATED)2022 21:31:35





             Test Item    Value        Reference Range Interpretation Comments

 

             POCT GLU (test code = 5110849912) 119 mg/dL           H      

      

 

             Lab Interpretation (test code = Abnormal                           

    



             72116-6)                                            



[facility] GLUCOSE (AUTOMATED)2022 21:31:35





             Test Item    Value        Reference Range Interpretation Comments

 

             POCT GLU (test code = 8515355419) 119 mg/dL           H      

      

 

             Lab Interpretation (test code = Abnormal                           

    



             95907-2)                                            



[facility] GLUCOSE (AUTOMATED)2022 20:06:03





             Test Item    Value        Reference Range Interpretation Comments

 

             POCT GLU (test code = 2358796982) 177 mg/dL           H      

      

 

             Lab Interpretation (test code = Abnormal                           

    



             55622-9)                                            



[facility] GLUCOSE (AUTOMATED)2022 20:06:03





             Test Item    Value        Reference Range Interpretation Comments

 

             POCT GLU (test code = 9066593688) 177 mg/dL           H      

      

 

             Lab Interpretation (test code = Abnormal                           

    



             67469-7)                                            



[facility] GLUCOSE (AUTOMATED)2022 19:07:20





             Test Item    Value        Reference Range Interpretation Comments

 

             POCT GLU (test code = 9156640894) 185 mg/dL           H      

      

 

             Lab Interpretation (test code = Abnormal                           

    



             23167-5)                                            



[facility] GLUCOSE (AUTOMATED)2022 19:07:20





             Test Item    Value        Reference Range Interpretation Comments

 

             POCT GLU (test code = 0641270717) 185 mg/dL           H      

      

 

             Lab Interpretation (test code = Abnormal                           

    



             13248-4)                                            



[facility]





             Test Item    Value        Reference    Interpretation Comments



                                       Range                     

 

             TROPONIN I (test 0.000 ng/mL  See_Comment                [Automated



             code = 7633331371)                                        message] 

The



                                                                 system which



                                                                 generated this



                                                                 result



                                                                 transmitted



                                                                 reference range

:



                                                                 <=0.034. The



                                                                 reference range



                                                                 was not used to



                                                                 interpret this



                                                                 result as



                                                                 normal/abnormal

.

 

             MAL (test code = Reference (Normal)                           



             MAL)         Range (defined by                           



                          the 99th percentile                           



                          reference limit): <=                           



                          0.034 ng/mL Note:                           



                          Cardiac troponin                           



                          begins to rise 3-4                           



                          hours after the                           



                          onset of ischemia.                           



                          Repeat in 4-6 hours                           



                          if the sample was                           



                          drawn within 3-4                           



                          hours of the onset                           



                          of the symptom and                           



                          found normal.                           



                          Diagnosis of                           



                          myocardial injury is                           



                          made with acute                           



                          changes in cTn                           



                          concentrations with                           



                          at least one serial                           



                          sample above the                           



                          99th percentile                           



                          upper reference                           



                          limit (URL), taken                           



                          together with the                           



                          patient's clinical                           



                          presentation. Biotin                           



                          has been reported to                           



                          cause a negative                           



                          bias, interpret                           



                          results relative to                           



                          patient's use of                           



                          biotin.                                

 

             Lab Interpretation Normal                                 



             (test code =                                        



             81820-0)                                            



[facility] -32-77 18:44:53





             Test Item    Value        Reference    Interpretation Comments



                                       Range                     

 

             TROPONIN I (test 0.000 ng/mL  See_Comment                [Automated



             code = 6574489984)                                        message] 

The



                                                                 system which



                                                                 generated this



                                                                 result



                                                                 transmitted



                                                                 reference range

:



                                                                 <=0.034. The



                                                                 reference range



                                                                 was not used to



                                                                 interpret this



                                                                 result as



                                                                 normal/abnormal

.

 

             MAL (test code = Reference (Normal)                           



             MAL)         Range (defined by                           



                          the 99th percentile                           



                          reference limit): <=                           



                          0.034 ng/mL Note:                           



                          Cardiac troponin                           



                          begins to rise 3-4                           



                          hours after the                           



                          onset of ischemia.                           



                          Repeat in 4-6 hours                           



                          if the sample was                           



                          drawn within 3-4                           



                          hours of the onset                           



                          of the symptom and                           



                          found normal.                           



                          Diagnosis of                           



                          myocardial injury is                           



                          made with acute                           



                          changes in cTn                           



                          concentrations with                           



                          at least one serial                           



                          sample above the                           



                          99th percentile                           



                          upper reference                           



                          limit (URL), taken                           



                          together with the                           



                          patient's clinical                           



                          presentation. Biotin                           



                          has been reported to                           



                          cause a negative                           



                          bias, interpret                           



                          results relative to                           



                          patient's use of                           



                          biotin.                                

 

             Lab Interpretation Normal                                 



             (test code =                                        



             22945-0)                                            



[facility] -79-40 18:44:53





             Test Item    Value        Reference    Interpretation Comments



                                       Range                     

 

             TROPONIN I (test 0.000 ng/mL  See_Comment                [Automated



             code = 3686546074)                                        message] 

The



                                                                 system which



                                                                 generated this



                                                                 result



                                                                 transmitted



                                                                 reference range

:



                                                                 <=0.034. The



                                                                 reference range



                                                                 was not used to



                                                                 interpret this



                                                                 result as



                                                                 normal/abnormal

.

 

             MAL (test code = Reference (Normal)                           



             MAL)         Range (defined by                           



                          the 99th percentile                           



                          reference limit): <=                           



                          0.034 ng/mL Note:                           



                          Cardiac troponin                           



                          begins to rise 3-4                           



                          hours after the                           



                          onset of ischemia.                           



                          Repeat in 4-6 hours                           



                          if the sample was                           



                          drawn within 3-4                           



                          hours of the onset                           



                          of the symptom and                           



                          found normal.                           



                          Diagnosis of                           



                          myocardial injury is                           



                          made with acute                           



                          changes in cTn                           



                          concentrations with                           



                          at least one serial                           



                          sample above the                           



                          99th percentile                           



                          upper reference                           



                          limit (URL), taken                           



                          together with the                           



                          patient's clinical                           



                          presentation. Biotin                           



                          has been reported to                           



                          cause a negative                           



                          bias, interpret                           



                          results relative to                           



                          patient's use of                           



                          biotin.                                

 

             Lab Interpretation Normal                                 



             (test code =                                        



             44160-6)                                            



[facility]Na, K, Cl, CO2, 
Glucose, BUN, Creatinine, Ca)2022 18:27:49





             Test Item    Value        Reference Range Interpretation Comments

 

             NA (test code = 138 mmol/L   135-145                   



             4620291594)                                         

 

             K (test code = 3.7 mmol/L   3.5-5                     



             1167939208)                                         

 

             CL (test code = 115 mmol/L          H            



             9315197789)                                         

 

             CO2 TOTAL (test code = 16 mmol/L    23-31        L            



             3453758159)                                         

 

             AGAP (test code =              2-16                      



             2843498524)                                         

 

             BUN (test code = 7 mg/dL      7-23                      



             2447922804)                                         

 

             GLUCOSE (test code = 195 mg/dL           H            



             1866350051)                                         

 

             CREATININE (test code = 0.52 mg/dL   0.6-1.25     L            



             6171944367)                                         

 

             CALCIUM (test code = 8.2 mg/dL    8.6-10.6     L            



             5171040823)                                         

 

             eGFR (test code =              mL/min/1.73m2              



             3851992616)                                         

 

             MAL (test code = MAL) Association of                           



                          Glomerular Filtration                           



                          Rate (GFR) and Staging                           



                          of Kidney Disease*                           



                          +---------------------                           



                          --+-------------------                           



                          --+-------------------                           



                          ------+| GFR                           



                          (mL/min/1.73 m2) ?|                           



                          With Kidney Damage ?|                           



                          ?Without Kidney                           



                          Damage+---------------                           



                          --------+-------------                           



                          --------+-------------                           



                          ------------+| ?>90 ?                           



                          ? ? ? ? ? ? ? ?|                           



                          ?Stage one ? ? ? ? ?|                           



                          ? Normal ? ? ? ? ? ? ?                           



                          ?+--------------------                           



                          ---+------------------                           



                          ---+------------------                           



                          -------+| ?60-89 ? ? ?                           



                          ? ? ? ? ?| ?Stage two                           



                          ? ? ? ? ?| ? Decreased                           



                          GFR ? ? ? ?                            



                          +---------------------                           



                          --+-------------------                           



                          --+-------------------                           



                          ------+| ?30-59 ? ? ?                           



                          ? ? ? ? ?| ?Stage                           



                          three ? ? ? ?| ? Stage                           



                          three ? ? ? ? ?                           



                          +---------------------                           



                          --+-------------------                           



                          --+-------------------                           



                          ------+| ?15-29 ? ? ?                           



                          ? ? ? ? ?| ?Stage four                           



                          ? ? ? ? | ? Stage four                           



                          ? ? ? ? ?                              



                          ?+--------------------                           



                          ---+------------------                           



                          ---+------------------                           



                          -------+| ?<15 (or                           



                          dialysis) ? ?| ?Stage                           



                          five ? ? ? ? | ? Stage                           



                          five ? ? ? ? ?                           



                          ?+--------------------                           



                          ---+------------------                           



                          ---+------------------                           



                          -------+ *Each stage                           



                          assumes the associated                           



                          GFR level has been in                           



                          effect for at least                           



                          three months. ?Stages                           



                          1 to 5, with or                           



                          without kidney                           



                          disease, indicate                           



                          chronic kidney                           



                          disease. Notes:                           



                          Determination of                           



                          stages one and two                           



                          (with eGFR                             



                          >59mL/min/1.73 m2)                           



                          requires estimation of                           



                          kidney damage for at                           



                          least three months as                           



                          defined by structural                           



                          or functional                           



                          abnormalities of the                           



                          kidney, manifested by                           



                          either:Pathological                           



                          abnormalities or                           



                          Markers of kidney                           



                          damage (including                           



                          abnormalities in the                           



                          composition of the                           



                          blood or urine or                           



                          abnormalities in                           



                          imaging tests).                           

 

             Lab Interpretation Abnormal                               



             (test code = 61360-6)                                        



[facility] (Na, K, Cl, CO2, 
Glucose, BUN, Creatinine, Ca)2022 18:27:49





             Test Item    Value        Reference Range Interpretation Comments

 

             NA (test code = 138 mmol/L   135-145                   



             3550852964)                                         

 

             K (test code = 3.7 mmol/L   3.5-5                     



             6712088498)                                         

 

             CL (test code = 115 mmol/L          H            



             1856807677)                                         

 

             CO2 TOTAL (test code = 16 mmol/L    23-31        L            



             2323807940)                                         

 

             AGAP (test code =              2-16                      



             3613856993)                                         

 

             BUN (test code = 7 mg/dL      7-23                      



             6430120119)                                         

 

             GLUCOSE (test code = 195 mg/dL           H            



             [id number])                                         

 

             CREATININE (test code = 0.52 mg/dL   0.6-1.25     L            



             5884997340)                                         

 

             CALCIUM (test code = 8.2 mg/dL    8.6-10.6     L            



             8968513823)                                         

 

             eGFR (test code =              mL/min/1.73m2              



             6420453373)                                         

 

             MAL (test code = MAL) Association of                           



                          Glomerular Filtration                           



                          Rate (GFR) and Staging                           



                          of Kidney Disease*                           



                          +---------------------                           



                          --+-------------------                           



                          --+-------------------                           



                          ------+| GFR                           



                          (mL/min/1.73 m2) ?|                           



                          With Kidney Damage ?|                           



                          ?Without Kidney                           



                          Damage+---------------                           



                          --------+-------------                           



                          --------+-------------                           



                          ------------+| ?>90 ?                           



                          ? ? ? ? ? ? ? ?|                           



                          ?Stage one ? ? ? ? ?|                           



                          ? Normal ? ? ? ? ? ? ?                           



                          ?+--------------------                           



                          ---+------------------                           



                          ---+------------------                           



                          -------+| ?60-89 ? ? ?                           



                          ? ? ? ? ?| ?Stage two                           



                          ? ? ? ? ?| ? Decreased                           



                          GFR ? ? ? ?                            



                          +---------------------                           



                          --+-------------------                           



                          --+-------------------                           



                          ------+| ?30-59 ? ? ?                           



                          ? ? ? ? ?| ?Stage                           



                          three ? ? ? ?| ? Stage                           



                          three ? ? ? ? ?                           



                          +---------------------                           



                          --+-------------------                           



                          --+-------------------                           



                          ------+| ?15-29 ? ? ?                           



                          ? ? ? ? ?| ?Stage four                           



                          ? ? ? ? | ? Stage four                           



                          ? ? ? ? ?                              



                          ?+--------------------                           



                          ---+------------------                           



                          ---+------------------                           



                          -------+| ?<15 (or                           



                          dialysis) ? ?| ?Stage                           



                          five ? ? ? ? | ? Stage                           



                          five ? ? ? ? ?                           



                          ?+--------------------                           



                          ---+------------------                           



                          ---+------------------                           



                          -------+ *Each stage                           



                          assumes the associated                           



                          GFR level has been in                           



                          effect for at least                           



                          three months. ?Stages                           



                          1 to 5, with or                           



                          without kidney                           



                          disease, indicate                           



                          chronic kidney                           



                          disease. Notes:                           



                          Determination of                           



                          stages one and two                           



                          (with eGFR                             



                          >59mL/min/1.73 m2)                           



                          requires estimation of                           



                          kidney damage for at                           



                          least three months as                           



                          defined by structural                           



                          or functional                           



                          abnormalities of the                           



                          kidney, manifested by                           



                          either:Pathological                           



                          abnormalities or                           



                          Markers of kidney                           



                          damage (including                           



                          abnormalities in the                           



                          composition of the                           



                          blood or urine or                           



                          abnormalities in                           



                          imaging tests).                           

 

             Lab Interpretation Abnormal                               



             (test code = 62419-3)                                        



[id number]





             Test Item    Value        Reference Range Interpretation Comments

 

             LEILA (test code = 3.3 mmol/L                             



             0089308957)                                         

 

             MAL (test code = Normal Ranges: ? ?                           



             MAL)         Nonfasting ? ? ? ? ? Less                           



                          than 0.1 mmol/L ? ?                           



                          Overnight Fast ? ? ? Less                           



                          than 0.4 mmol/L ? ? Fasting                           



                          (1-2 weeks) ?6-8 mmol/L Test                          

 



                          developed and                           



                          characteristics determined                           



                          by [facility].                          

 



[facility]94[facility] 17:43:01





             Test Item    Value        Reference Range Interpretation Comments

 

             LEILA (test code = 3.3 mmol/L                             



             5055469595)                                         

 

             MAL (test code = Normal Ranges: ? ?                           



             MAL)         Nonfasting ? ? ? ? ? Less                           



                          than 0.1 mmol/L ? ?                           



                          Overnight Fast ? ? ? Less                           



                          than 0.4 mmol/L ? ? Fasting                           



                          (1-2 weeks) ?6-8 mmol/L Test                          

 



                          developed and                           



                          characteristics determined                           



                          by [facility].                          

 



[facility] GLUCOSE (AUTOMATED)2022 17:29:05





             Test Item    Value        Reference Range Interpretation Comments

 

             POCT GLU (test code = 5840446942) 192 mg/dL           H      

      

 

             Lab Interpretation (test code = Abnormal                           

    



             81995-5)                                            



[facility] GLUCOSE (AUTOMATED)2022 17:29:05





             Test Item    Value        Reference Range Interpretation Comments

 

             POCT GLU (test code = 3540267732) 192 mg/dL           H      

      

 

             Lab Interpretation (test code = Abnormal                           

    



             02156-6)                                            



[facility] [id number] 16:28:42





             Test Item    Value        Reference Range Interpretation Comments

 

             OSMOLALITY (test code =              See_Comment  H             [Au

tomated message]



             2692-2)                                             The system [employer]



                                                                 generated this 

result



                                                                 transmitted ref

erence



                                                                 range: 278 - 30

5



                                                                 mOsm/kg. The



                                                                 reference range

 was



                                                                 not used to int

erpret



                                                                 this result as



                                                                 normal/abnormal

.

 

             Lab Interpretation (test Abnormal                               



             code = 42922-8)                                        



[facility] [id number] 16:28:42





             Test Item    Value        Reference Range Interpretation Comments

 

             OSMOLALITY (test code =              See_Comment  H             [Au

tomated message]



             2692-2)                                             The system [employer]



                                                                 generated this 

result



                                                                 transmitted ref

erence



                                                                 range: 278 - 30

5



                                                                 mOsm/kg. The



                                                                 reference range

 was



                                                                 not used to int

erpret



                                                                 this result as



                                                                 normal/abnormal

.

 

             Lab Interpretation (test Abnormal                               



             code = 04707-9)                                        



[facility] [id number] 16:28:42





             Test Item    Value        Reference Range Interpretation Comments

 

             OSMOLALITY (test code =              See_Comment  H             [Au

tomated message]



             2692-2)                                             The system [employer]



                                                                 generated this 

result



                                                                 transmitted ref

erence



                                                                 range: 278 - 30

5



                                                                 mOsm/kg. The



                                                                 reference range

 was



                                                                 not used to int

erpret



                                                                 this result as



                                                                 normal/abnormal

.

 

             Lab Interpretation (test Abnormal                               



             code = 10410-5)                                        



[facility] Metabolic Panel (Na, K, Cl, CO2, 
Glucose, BUN, Creatinine, Ca)2022 16:02:27





             Test Item    Value        Reference Range Interpretation Comments

 

             NA (test code = 138 mmol/L   135-145                   



             5604301129)                                         

 

             K (test code = 3.7 mmol/L   3.5-5                     



             9503574953)                                         

 

             CL (test code = 117 mmol/L          H            



             2397469452)                                         

 

             CO2 TOTAL (test code = 15 mmol/L    23-31        L            



             4942896349)                                         

 

             AGAP (test code =              2-16                      



             8226561031)                                         

 

             BUN (test code = 7 mg/dL      7-23                      



             7573376460)                                         

 

             GLUCOSE (test code = 204 mg/dL           H            



             8278622533)                                         

 

             CREATININE (test code = 0.41 mg/dL   0.6-1.25     L            



             9856309711)                                         

 

             CALCIUM (test code = 8.3 mg/dL    8.6-10.6     L            



             6669309925)                                         

 

             eGFR (test code =              mL/min/1.73m2              



             6264836437)                                         

 

             MAL (test code = MAL) Association of                           



                          Glomerular Filtration                           



                          Rate (GFR) and Staging                           



                          of Kidney Disease*                           



                          +---------------------                           



                          --+-------------------                           



                          --+-------------------                           



                          ------+| GFR                           



                          (mL/min/1.73 m2) ?|                           



                          With Kidney Damage ?|                           



                          ?Without Kidney                           



                          Damage+---------------                           



                          --------+-------------                           



                          --------+-------------                           



                          ------------+| ?>90 ?                           



                          ? ? ? ? ? ? ? ?|                           



                          ?Stage one ? ? ? ? ?|                           



                          ? Normal ? ? ? ? ? ? ?                           



                          ?+--------------------                           



                          ---+------------------                           



                          ---+------------------                           



                          -------+| ?60-89 ? ? ?                           



                          ? ? ? ? ?| ?Stage two                           



                          ? ? ? ? ?| ? Decreased                           



                          GFR ? ? ? ?                            



                          +---------------------                           



                          --+-------------------                           



                          --+-------------------                           



                          ------+| ?30-59 ? ? ?                           



                          ? ? ? ? ?| ?Stage                           



                          three ? ? ? ?| ? Stage                           



                          three ? ? ? ? ?                           



                          +---------------------                           



                          --+-------------------                           



                          --+-------------------                           



                          ------+| ?15-29 ? ? ?                           



                          ? ? ? ? ?| ?Stage four                           



                          ? ? ? ? | ? Stage four                           



                          ? ? ? ? ?                              



                          ?+--------------------                           



                          ---+------------------                           



                          ---+------------------                           



                          -------+| ?<15 (or                           



                          dialysis) ? ?| ?Stage                           



                          five ? ? ? ? | ? Stage                           



                          five ? ? ? ? ?                           



                          ?+--------------------                           



                          ---+------------------                           



                          ---+------------------                           



                          -------+ *Each stage                           



                          assumes the associated                           



                          GFR level has been in                           



                          effect for at least                           



                          three months. ?Stages                           



                          1 to 5, with or                           



                          without kidney                           



                          disease, indicate                           



                          chronic kidney                           



                          disease. Notes:                           



                          Determination of                           



                          stages one and two                           



                          (with eGFR                             



                          >59mL/min/1.73 m2)                           



                          requires estimation of                           



                          kidney damage for at                           



                          least three months as                           



                          defined by structural                           



                          or functional                           



                          abnormalities of the                           



                          kidney, manifested by                           



                          either:Pathological                           



                          abnormalities or                           



                          Markers of kidney                           



                          damage (including                           



                          abnormalities in the                           



                          composition of the                           



                          blood or urine or                           



                          abnormalities in                           



                          imaging tests).                           

 

             Lab Interpretation Abnormal                               



             (test code = 26499-6)                                        



[facility] Metabolic Panel (Na, K, Cl, CO2, 
Glucose, BUN, Creatinine, Ca)2022 16:02:27





             Test Item    Value        Reference Range Interpretation Comments

 

             NA (test code = 138 mmol/L   135-145                   



             7845093352)                                         

 

             K (test code = 3.7 mmol/L   3.5-5                     



             4329071053)                                         

 

             CL (test code = 117 mmol/L          H            



             2073864411)                                         

 

             CO2 TOTAL (test code = 15 mmol/L    23-31        L            



             5935480579)                                         

 

             AGAP (test code =              2-16                      



             5678837778)                                         

 

             BUN (test code = 7 mg/dL      7-23                      



             0810016118)                                         

 

             GLUCOSE (test code = 204 mg/dL           H            



             1196268699)                                         

 

             CREATININE (test code = 0.41 mg/dL   0.6-1.25     L            



             1630456356)                                         

 

             CALCIUM (test code = 8.3 mg/dL    8.6-10.6     L            



             6845407729)                                         

 

             eGFR (test code =              mL/min/1.73m2              



             1870023224)                                         

 

             MAL (test code = MLA) Association of                           



                          Glomerular Filtration                           



                          Rate (GFR) and Staging                           



                          of Kidney Disease*                           



                          +---------------------                           



                          --+-------------------                           



                          --+-------------------                           



                          ------+| GFR                           



                          (mL/min/1.73 m2) ?|                           



                          With Kidney Damage ?|                           



                          ?Without Kidney                           



                          Damage+---------------                           



                          --------+-------------                           



                          --------+-------------                           



                          ------------+| ?>90 ?                           



                          ? ? ? ? ? ? ? ?|                           



                          ?Stage one ? ? ? ? ?|                           



                          ? Normal ? ? ? ? ? ? ?                           



                          ?+--------------------                           



                          ---+------------------                           



                          ---+------------------                           



                          -------+| ?60-89 ? ? ?                           



                          ? ? ? ? ?| ?Stage two                           



                          ? ? ? ? ?| ? Decreased                           



                          GFR ? ? ? ?                            



                          +---------------------                           



                          --+-------------------                           



                          --+-------------------                           



                          ------+| ?30-59 ? ? ?                           



                          ? ? ? ? ?| ?Stage                           



                          three ? ? ? ?| ? Stage                           



                          three ? ? ? ? ?                           



                          +---------------------                           



                          --+-------------------                           



                          --+-------------------                           



                          ------+| ?15-29 ? ? ?                           



                          ? ? ? ? ?| ?Stage four                           



                          ? ? ? ? | ? Stage four                           



                          ? ? ? ? ?                              



                          ?+--------------------                           



                          ---+------------------                           



                          ---+------------------                           



                          -------+| ?<15 (or                           



                          dialysis) ? ?| ?Stage                           



                          five ? ? ? ? | ? Stage                           



                          five ? ? ? ? ?                           



                          ?+--------------------                           



                          ---+------------------                           



                          ---+------------------                           



                          -------+ *Each stage                           



                          assumes the associated                           



                          GFR level has been in                           



                          effect for at least                           



                          three months. ?Stages                           



                          1 to 5, with or                           



                          without kidney                           



                          disease, indicate                           



                          chronic kidney                           



                          disease. Notes:                           



                          Determination of                           



                          stages one and two                           



                          (with eGFR                             



                          >59mL/min/1.73 m2)                           



                          requires estimation of                           



                          kidney damage for at                           



                          least three months as                           



                          defined by structural                           



                          or functional                           



                          abnormalities of the                           



                          kidney, manifested by                           



                          either:Pathological                           



                          abnormalities or                           



                          Markers of kidney                           



                          damage (including                           



                          abnormalities in the                           



                          composition of the                           



                          blood or urine or                           



                          abnormalities in                           



                          imaging tests).                           

 

             Lab Interpretation Abnormal                               



             (test code = 28250-1)                                        



[facility] GLUCOSE (AUTOMATED)2022 15:54:35





             Test Item    Value        Reference Range Interpretation Comments

 

             POCT GLU (test code = 1479052528) 200 mg/dL           H      

      

 

             Lab Interpretation (test code = Abnormal                           

    



             12753-9)                                            



[facility] GLUCOSE (AUTOMATED)2022 15:54:35





             Test Item    Value        Reference Range Interpretation Comments

 

             POCT GLU (test code = 1738380250) 200 mg/dL           H      

      

 

             Lab Interpretation (test code = Abnormal                           

    



             47385-2)                                            



[facility] GLUCOSE (AUTOMATED)2022 14:57:17





             Test Item    Value        Reference Range Interpretation Comments

 

             POCT GLU (test code = 0667181342) 211 mg/dL           H      

      

 

             Lab Interpretation (test code = Abnormal                           

    



             48643-2)                                            



[facility] GLUCOSE (AUTOMATED)2022 14:57:17





             Test Item    Value        Reference Range Interpretation Comments

 

             POCT GLU (test code = 6038081750) 211 mg/dL           H      

      

 

             Lab Interpretation (test code = Abnormal                           

    



             02015-2)                                            



[facility] GLUCOSE (AUTOMATED)2022 13:50:06





             Test Item    Value        Reference Range Interpretation Comments

 

             POCT GLU (test code = 9983650297) 216 mg/dL           H      

      

 

             Lab Interpretation (test code = Abnormal                           

    



             08473-3)                                            



[facility] GLUCOSE (AUTOMATED)2022 13:50:06





             Test Item    Value        Reference Range Interpretation Comments

 

             POCT GLU (test code = 6193064697) 216 mg/dL           H      

      

 

             Lab Interpretation (test code = Abnormal                           

    



             60456-1)                                            



[facility] GLUCOSE (AUTOMATED)2022 10:43:13





             Test Item    Value        Reference Range Interpretation Comments

 

             POCT GLU (test code = 5759874667) 199 mg/dL           H      

      

 

             Lab Interpretation (test code = Abnormal                           

    



             10122-0)                                            



[facility] GLUCOSE (AUTOMATED)2022 10:43:13





             Test Item    Value        Reference Range Interpretation Comments

 

             POCT GLU (test code = 5980866849) 199 mg/dL           H      

      

 

             Lab Interpretation (test code = Abnormal                           

    



             41465-7)                                            



[facility] GLUCOSE (AUTOMATED)2022 09:31:59





             Test Item    Value        Reference Range Interpretation Comments

 

             POCT GLU (test code = 1190022938) 172 mg/dL           H      

      

 

             Lab Interpretation (test code = Abnormal                           

    



             73633-7)                                            



[facility] GLUCOSE (AUTOMATED)2022 09:31:59





             Test Item    Value        Reference Range Interpretation Comments

 

             POCT GLU (test code = 2220934192) 172 mg/dL           H      

      

 

             Lab Interpretation (test code = Abnormal                           

    



             39187-4)                                            



[facility] Metabolic Panel (Na, K, Cl, CO2, 
Glucose, BUN, Creatinine, Ca)2022 08:02:13





             Test Item    Value        Reference Range Interpretation Comments

 

             NA (test code = 140 mmol/L   135-145                   



             6789913413)                                         

 

             K (test code = 3.8 mmol/L   3.5-5                     



             3019076002)                                         

 

             CL (test code = 110 mmol/L          H            



             8136633484)                                         

 

             CO2 TOTAL (test code = 13 mmol/L    23-31        L            



             5188755989)                                         

 

             AGAP (test code =              2-16         H            



             2706437582)                                         

 

             BUN (test code = 10 mg/dL     7-23                      



             5088183655)                                         

 

             GLUCOSE (test code = 223 mg/dL           H            



             0548972342)                                         

 

             CREATININE (test code = 0.58 mg/dL   0.6-1.25     L            



             3208076851)                                         

 

             CALCIUM (test code = 8.8 mg/dL    8.6-10.6                  



             9899308382)                                         

 

             eGFR (test code =              mL/min/1.73m2              



             2833270515)                                         

 

             MAL (test code = MAL) Association of                           



                          Glomerular Filtration                           



                          Rate (GFR) and Staging                           



                          of Kidney Disease*                           



                          +---------------------                           



                          --+-------------------                           



                          --+-------------------                           



                          ------+| GFR                           



                          (mL/min/1.73 m2) ?|                           



                          With Kidney Damage ?|                           



                          ?Without Kidney                           



                          Damage+---------------                           



                          --------+-------------                           



                          --------+-------------                           



                          ------------+| ?>90 ?                           



                          ? ? ? ? ? ? ? ?|                           



                          ?Stage one ? ? ? ? ?|                           



                          ? Normal ? ? ? ? ? ? ?                           



                          ?+--------------------                           



                          ---+------------------                           



                          ---+------------------                           



                          -------+| ?60-89 ? ? ?                           



                          ? ? ? ? ?| ?Stage two                           



                          ? ? ? ? ?| ? Decreased                           



                          GFR ? ? ? ?                            



                          +---------------------                           



                          --+-------------------                           



                          --+-------------------                           



                          ------+| ?30-59 ? ? ?                           



                          ? ? ? ? ?| ?Stage                           



                          three ? ? ? ?| ? Stage                           



                          three ? ? ? ? ?                           



                          +---------------------                           



                          --+-------------------                           



                          --+-------------------                           



                          ------+| ?15-29 ? ? ?                           



                          ? ? ? ? ?| ?Stage four                           



                          ? ? ? ? | ? Stage four                           



                          ? ? ? ? ?                              



                          ?+--------------------                           



                          ---+------------------                           



                          ---+------------------                           



                          -------+| ?<15 (or                           



                          dialysis) ? ?| ?Stage                           



                          five ? ? ? ? | ? Stage                           



                          five ? ? ? ? ?                           



                          ?+--------------------                           



                          ---+------------------                           



                          ---+------------------                           



                          -------+ *Each stage                           



                          assumes the associated                           



                          GFR level has been in                           



                          effect for at least                           



                          three months. ?Stages                           



                          1 to 5, with or                           



                          without kidney                           



                          disease, indicate                           



                          chronic kidney                           



                          disease. Notes:                           



                          Determination of                           



                          stages one and two                           



                          (with eGFR                             



                          >59mL/min/1.73 m2)                           



                          requires estimation of                           



                          kidney damage for at                           



                          least three months as                           



                          defined by structural                           



                          or functional                           



                          abnormalities of the                           



                          kidney, manifested by                           



                          either:Pathological                           



                          abnormalities or                           



                          Markers of kidney                           



                          damage (including                           



                          abnormalities in the                           



                          composition of the                           



                          blood or urine or                           



                          abnormalities in                           



                          imaging tests).                           

 

             Lab Interpretation Abnormal                               



             (test code = 13088-8)                                        



[facility] Metabolic Panel (Na, K, Cl, CO2, 
Glucose, BUN, Creatinine, Ca)2022 08:02:13





             Test Item    Value        Reference Range Interpretation Comments

 

             NA (test code = 140 mmol/L   135-145                   



             4618222508)                                         

 

             K (test code = 3.8 mmol/L   3.5-5                     



             4062627753)                                         

 

             CL (test code = 110 mmol/L          H            



             0737258108)                                         

 

             CO2 TOTAL (test code = 13 mmol/L    23-31        L            



             9236580007)                                         

 

             AGAP (test code =              2-16         H            



             3308557000)                                         

 

             BUN (test code = 10 mg/dL     7-23                      



             4153286385)                                         

 

             GLUCOSE (test code = 223 mg/dL           H            



             1434185386)                                         

 

             CREATININE (test code = 0.58 mg/dL   0.6-1.25     L            



             4683847725)                                         

 

             CALCIUM (test code = 8.8 mg/dL    8.6-10.6                  



             6351338085)                                         

 

             eGFR (test code =              mL/min/1.73m2              



             1771736480)                                         

 

             MAL (test code = MAL) Association of                           



                          Glomerular Filtration                           



                          Rate (GFR) and Staging                           



                          of Kidney Disease*                           



                          +---------------------                           



                          --+-------------------                           



                          --+-------------------                           



                          ------+| GFR                           



                          (mL/min/1.73 m2) ?|                           



                          With Kidney Damage ?|                           



                          ?Without Kidney                           



                          Damage+---------------                           



                          --------+-------------                           



                          --------+-------------                           



                          ------------+| ?>90 ?                           



                          ? ? ? ? ? ? ? ?|                           



                          ?Stage one ? ? ? ? ?|                           



                          ? Normal ? ? ? ? ? ? ?                           



                          ?+--------------------                           



                          ---+------------------                           



                          ---+------------------                           



                          -------+| ?60-89 ? ? ?                           



                          ? ? ? ? ?| ?Stage two                           



                          ? ? ? ? ?| ? Decreased                           



                          GFR ? ? ? ?                            



                          +---------------------                           



                          --+-------------------                           



                          --+-------------------                           



                          ------+| ?30-59 ? ? ?                           



                          ? ? ? ? ?| ?Stage                           



                          three ? ? ? ?| ? Stage                           



                          three ? ? ? ? ?                           



                          +---------------------                           



                          --+-------------------                           



                          --+-------------------                           



                          ------+| ?15-29 ? ? ?                           



                          ? ? ? ? ?| ?Stage four                           



                          ? ? ? ? | ? Stage four                           



                          ? ? ? ? ?                              



                          ?+--------------------                           



                          ---+------------------                           



                          ---+------------------                           



                          -------+| ?<15 (or                           



                          dialysis) ? ?| ?Stage                           



                          five ? ? ? ? | ? Stage                           



                          five ? ? ? ? ?                           



                          ?+--------------------                           



                          ---+------------------                           



                          ---+------------------                           



                          -------+ *Each stage                           



                          assumes the associated                           



                          GFR level has been in                           



                          effect for at least                           



                          three months. ?Stages                           



                          1 to 5, with or                           



                          without kidney                           



                          disease, indicate                           



                          chronic kidney                           



                          disease. Notes:                           



                          Determination of                           



                          stages one and two                           



                          (with eGFR                             



                          >59mL/min/1.73 m2)                           



                          requires estimation of                           



                          kidney damage for at                           



                          least three months as                           



                          defined by structural                           



                          or functional                           



                          abnormalities of the                           



                          kidney, manifested by                           



                          either:Pathological                           



                          abnormalities or                           



                          Markers of kidney                           



                          damage (including                           



                          abnormalities in the                           



                          composition of the                           



                          blood or urine or                           



                          abnormalities in                           



                          imaging tests).                           

 

             Lab Interpretation Abnormal                               



             (test code = 93632-7)                                        



[facility] GLUCOSE (AUTOMATED)2022 07:26:01





             Test Item    Value        Reference Range Interpretation Comments

 

             POCT GLU (test code = 7700188332) 245 mg/dL           H      

      

 

             Lab Interpretation (test code = Abnormal                           

    



             62377-0)                                            



[facility] GLUCOSE (AUTOMATED)2022 07:26:01





             Test Item    Value        Reference Range Interpretation Comments

 

             POCT GLU (test code = 1361067181) 245 mg/dL           H      

      

 

             Lab Interpretation (test code = Abnormal                           

    



             96683-5)                                            



[facility] GLUCOSE(AGE &gt;30DAYS)2022 
07:11:00





             Test Item    Value        Reference Range Interpretation Comments

 

             POCT Glu (age>30days) (test code = 245 mg/dL                 

       



             3342)                                               

 

             Lab Interpretation (test code = Normal                             

    



             99684-3)                                            



[facility] GLUCOSE(AGE &gt;30DAYS)2022 
07:11:00





             Test Item    Value        Reference Range Interpretation Comments

 

             POCT Glu (age>30days) (test code = 245 mg/dL                 

       



             3342)                                               

 

             Lab Interpretation (test code = Normal                             

    



             63861-9)                                            



[facility] 
07:11:00





             Test Item    Value        Reference Range Interpretation Comments

 

             POCT Glu (age>30days) (test code = 245 mg/dL                 

       



             3342)                                               

 

             Lab Interpretation (test code = Normal                             

    



             00345-5)                                            



[facility] 
05:57:25





             Test Item    Value        Reference Range Interpretation Comments

 

             HGB A1C (test code = 12.7 %       4-5.7        H            



             4548-4)                                             

 

             MAL (test code = MAL) Reference                              



                          RangesNormal:                           



                          <5.7%Prediabetes:                           



                          5.7 - 6.4%Diabetes:                           



                          > 6.5%                                 

 

             Lab Interpretation (test Abnormal                               



             code = 96260-9)                                        



[facility] 
05:57:25





             Test Item    Value        Reference Range Interpretation Comments

 

             HGB A1C (test code = 12.7 %       4-5.7        H            



             4548-4)                                             

 

             MAL (test code = MAL) Reference                              



                          RangesNormal:                           



                          <5.7%Prediabetes:                           



                          5.7 - 6.4%Diabetes:                           



                          > 6.5%                                 

 

             Lab Interpretation (test Abnormal                               



             code = 10824-1)                                        



[facility] 
05:57:25





             Test Item    Value        Reference Range Interpretation Comments

 

             HGB A1C (test code = 12.7 %       4-5.7        H            



             4548-4)                                             

 

             MAL (test code = MAL) Reference                              



                          RangesNormal:                           



                          <5.7%Prediabetes:                           



                          5.7 - 6.4%Diabetes:                           



                          > 6.5%                                 

 

             Lab Interpretation (test Abnormal                               



             code = 48028-4)                                        



[facility] [id number] 05:46:44





             Test Item    Value        Reference Range Interpretation Comments

 

             MAGNESIUM (test code = 1567796829) 1.7 mg/dL    1.7-2.4            

       

 

             Lab Interpretation (test code = Normal                             

    



             08933-1)                                            



[id number]46[facility]44





             Test Item    Value        Reference Range Interpretation Comments

 

             MAGNESIUM (test code = 2021900882) 1.7 mg/dL    1.7-2.4            

       

 

             Lab Interpretation (test code = Normal                             

    



             46477-9)                                            



[facility] [id number] 05:46:24





             Test Item    Value        Reference Range Interpretation Comments

 

             PHOSPHORUS (test code = 8129839118) 4.0 mg/dL    2.5-5             

        

 

             Lab Interpretation (test code = Normal                             

    



             26869-4)                                            



[facility] [id number] 05:46:24





             Test Item    Value        Reference Range Interpretation Comments

 

             PHOSPHORUS (test code = 2062241303) 4.0 mg/dL    2.5-5             

        

 

             Lab Interpretation (test code = Normal                             

    



             05086-6)                                            



[facility] [id number] 05:46:24





             Test Item    Value        Reference Range Interpretation Comments

 

             PHOSPHORUS (test code = 1415864496) 4.0 mg/dL    2.5-5             

        

 

             Lab Interpretation (test code = Normal                             

    



             03427-0)                                            



[facility] GLUCOSE (AUTOMATED)2022 05:11:10





             Test Item    Value        Reference Range Interpretation Comments

 

             POCT GLU (test code = 8849950395) 410 mg/dL           H      

      

 

             Lab Interpretation (test code = Abnormal                           

    



             03359-9)                                            



[facility] GLUCOSE (AUTOMATED)2022 05:11:10





             Test Item    Value        Reference Range Interpretation Comments

 

             POCT GLU (test code = 5454558743) 410 mg/dL           H      

      

 

             Lab Interpretation (test code = Abnormal                           

    



             08015-1)                                            



[facility] GLUCOSE(AGE &gt;30DAYS)2022 
05:11:00





             Test Item    Value        Reference Range Interpretation Comments

 

             POCT Glu (age>30days) (test code = 410 mg/dL           A     

       



             3342)                                               

 

             Lab Interpretation (test code = Abnormal                           

    



             37160-0)                                            



[facility] GLUCOSE(AGE &gt;30DAYS)2022 
05:11:00





             Test Item    Value        Reference Range Interpretation Comments

 

             POCT Glu (age>30days) (test code = 410 mg/dL           A     

       



             3342)                                               

 

             Lab Interpretation (test code = Abnormal                           

    



             74685-9)                                            



[facility] WITH [id number] 05:03:57





             Test Item    Value        Reference Range Interpretation Comments

 

             WBC (test code =              See_Comment                [Automated



             6690-2)                                             message] The sy

stem



                                                                 which generated



                                                                 this result



                                                                 transmitted



                                                                 reference range

:



                                                                 4.20 - 10.70



                                                                 10*3/?L. The



                                                                 reference range

 was



                                                                 not used to



                                                                 interpret this



                                                                 result as



                                                                 normal/abnormal

.

 

             RBC (test code =              See_Comment                [Automated



             789-8)                                              message] The sy

stem



                                                                 which generated



                                                                 this result



                                                                 transmitted



                                                                 reference range

:



                                                                 4.26 - 5.52



                                                                 10*6/?L. The



                                                                 reference range

 was



                                                                 not used to



                                                                 interpret this



                                                                 result as



                                                                 normal/abnormal

.

 

             HGB (test code = 13.9 g/dL    12.2-16.4                 



             718-7)                                              

 

             HCT (test code = 42.6 %       38.4-49.3                 



             4544-3)                                             

 

             MCV (test code = 88.2 fL      81.7-95.6                 



             787-2)                                              

 

             MCH (test code = 28.8 pg      26.1-32.7                 



             785-6)                                              

 

             MCHC (test code = 32.6 g/dL    31.2-35                   



             786-4)                                              

 

             RDW-SD (test code = 44.1 fL      38.5-51.6                 



             58409-4)                                            

 

             RDW-CV (test code = 13.8 %       12.1-15.4                 



             788-0)                                              

 

             PLT (test code =              See_Comment  H             [Automated



             777-3)                                              message] The sy

stem



                                                                 which generated



                                                                 this result



                                                                 transmitted



                                                                 reference range

:



                                                                 150 - 328 10*3/

?L.



                                                                 The reference r

zhen



                                                                 was not used to



                                                                 interpret this



                                                                 result as



                                                                 normal/abnormal

.

 

             MPV (test code = 12.2 fL      9.8-13                    



             44885-0)                                            

 

             IPF % (test code = 9.4 %        1.2-10.7                  Platelet 

count



             [id number])                                         measured by



                                                                 fluorescence



                                                                 method.

 

             NRBC/100 WBC (test              See_Comment                [Automat

ed



             code = 4747166745)                                        message] 

The system



                                                                 which generated



                                                                 this result



                                                                 transmitted



                                                                 reference range

:



                                                                 0.0 - 10.0 /100



                                                                 WBCs. The refer

ence



                                                                 range was not u

sed



                                                                 to interpret th

is



                                                                 result as



                                                                 normal/abnormal

.

 

             NRBC x10^3 (test code              See_Comment                [Auto

mated



             = [id number])                                        message] The s

ystem



                                                                 which generated



                                                                 this result



                                                                 transmitted



                                                                 reference range

:



                                                                 10*3/?L. The



                                                                 reference range

 was



                                                                 not used to



                                                                 interpret this



                                                                 result as



                                                                 normal/abnormal

.

 

             GRAN MAT (NEUT) % 65.1 %                                 



             (test code = 770-8)                                        

 

             IMM GRAN % (test code 1.10 %                                 



             = 1258574185)                                        

 

             LYMPH % (test code = 22.4 %                                 



             736-9)                                              

 

             MONO % (test code = 9.8 %                                  



             5905-5)                                             

 

             EOS % (test code = 1.4 %                                  



             713-8)                                              

 

             BASO % (test code = 0.2 %                                  



             706-2)                                              

 

             GRAN MAT x10^3(ANC) 6.15 10*3/uL 1.99-6.95                 



             (test code =                                        



             9673725113)                                         

 

             IMM GRAN x10^3 (test 0.10 10*3/uL 0-0.06       H            



             code = 0027605982)                                        

 

             LYMPH x10^3 (test code 2.11 10*3/uL 1.09-3.23                 



             = 731-0)                                            

 

             MONO x10^3 (test code 0.92 10*3/uL 0.36-1.02                 



             = 742-7)                                            

 

             EOS x10^3 (test code = 0.13 10*3/uL 0.06-0.53                 



             711-2)                                              

 

             BASO x10^3 (test code              0.01-0.09                 



             = 704-7)                                            

 

             Lab Interpretation Abnormal                               



             (test code = 00231-4)                                        



[facility] WITH [id number] 05:03:57





             Test Item    Value        Reference Range Interpretation Comments

 

             WBC (test code =              See_Comment                [Automated



             9090-2)                                             message] The sy

stem



                                                                 which generated



                                                                 this result



                                                                 transmitted



                                                                 reference range

:



                                                                 4.20 - 10.70



                                                                 10*3/?L. The



                                                                 reference range

 was



                                                                 not used to



                                                                 interpret this



                                                                 result as



                                                                 normal/abnormal

.

 

             RBC (test code =              See_Comment                [Automated



             169-8)                                              message] The sy

stem



                                                                 which generated



                                                                 this result



                                                                 transmitted



                                                                 reference range

:



                                                                 4.26 - 5.52



                                                                 10*6/?L. The



                                                                 reference range

 was



                                                                 not used to



                                                                 interpret this



                                                                 result as



                                                                 normal/abnormal

.

 

             HGB (test code = 13.9 g/dL    12.2-16.4                 



             718-7)                                              

 

             HCT (test code = 42.6 %       38.4-49.3                 



             4544-3)                                             

 

             MCV (test code = 88.2 fL      81.7-95.6                 



             787-2)                                              

 

             MCH (test code = 28.8 pg      26.1-32.7                 



             785-6)                                              

 

             MCHC (test code = 32.6 g/dL    31.2-35                   



             786-4)                                              

 

             RDW-SD (test code = 44.1 fL      38.5-51.6                 



             44843-9)                                            

 

             RDW-CV (test code = 13.8 %       12.1-15.4                 



             788-0)                                              

 

             PLT (test code =              See_Comment  H             [Automated



             777-3)                                              message] The sy

stem



                                                                 which generated



                                                                 this result



                                                                 transmitted



                                                                 reference range

:



                                                                 150 - 328 10*3/

?L.



                                                                 The reference r

zhen



                                                                 was not used to



                                                                 interpret this



                                                                 result as



                                                                 normal/abnormal

.

 

             MPV (test code = 12.2 fL      9.8-13                    



             66680-8)                                            

 

             IPF % (test code = 9.4 %        1.2-10.7                  Platelet 

count



             [id number])                                         measured by



                                                                 fluorescence



                                                                 method.

 

             NRBC/100 WBC (test              See_Comment                [Automat

ed



             code = 1352193452)                                        message] 

The system



                                                                 which generated



                                                                 this result



                                                                 transmitted



                                                                 reference range

:



                                                                 0.0 - 10.0 /100



                                                                 WBCs. The refer

ence



                                                                 range was not u

sed



                                                                 to interpret th

is



                                                                 result as



                                                                 normal/abnormal

.

 

             NRBC x10^3 (test code              See_Comment                [Auto

mated



             = 4197196333)                                        message] The s

ystem



                                                                 which generated



                                                                 this result



                                                                 transmitted



                                                                 reference range

:



                                                                 10*3/?L. The



                                                                 reference range

 was



                                                                 not used to



                                                                 interpret this



                                                                 result as



                                                                 normal/abnormal

.

 

             GRAN MAT (NEUT) % 65.1 %                                 



             (test code = 770-8)                                        

 

             IMM GRAN % (test code 1.10 %                                 



             = [id number])                                        

 

             LYMPH % (test code = 22.4 %                                 



             736-9)                                              

 

             MONO % (test code = 9.8 %                                  



             5905-5)                                             

 

             EOS % (test code = 1.4 %                                  



             713-8)                                              

 

             BASO % (test code = 0.2 %                                  



             706-2)                                              

 

             GRAN MAT x10^3(ANC) 6.15 10*3/uL 1.99-6.95                 



             (test code =                                        



             2760141980)                                         

 

             IMM GRAN x10^3 (test 0.10 10*3/uL 0-0.06       H            



             code = 1386227238)                                        

 

             LYMPH x10^3 (test code 2.11 10*3/uL 1.09-3.23                 



             = 731-0)                                            

 

             MONO x10^3 (test code 0.92 10*3/uL 0.36-1.02                 



             = 742-7)                                            

 

             EOS x10^3 (test code = 0.13 10*3/uL 0.06-0.53                 



             711-2)                                              

 

             BASO x10^3 (test code              0.01-0.09                 



             = 704-7)                                            

 

             Lab Interpretation Abnormal                               



             (test code = 50356-2)                                        



[facility] METABOLIC PANEL (NA, K, CL, CO2, 
GLUCOSE, BUN, CREATININE, CA)2022 04:46:12





             Test Item    Value        Reference Range Interpretation Comments

 

             NA (test code = 136 mmol/L   135-145                   



             [id number])                                         

 

             K (test code = 5.1 mmol/L   3.5-5        H            



             7196004191)                                         

 

             CL (test code = 106 mmol/L                       



             1795237460)                                         

 

             CO2 TOTAL (test code = 9 mmol/L     23-31        L            



             7272951594)                                         

 

             AGAP (test code =              2-16         H            



             1160018140)                                         

 

             BUN (test code = 11 mg/dL     7-23                      



             1988194114)                                         

 

             GLUCOSE (test code = 405 mg/dL           H            



             9652095851)                                         

 

             CREATININE (test code = 0.62 mg/dL   0.6-1.25                  



             2258774106)                                         

 

             CALCIUM (test code = 9.5 mg/dL    8.6-10.6                  



             6057497151)                                         

 

             eGFR (test code =              mL/min/1.73m2              



             1374258531)                                         

 

             MAL (test code = MAL) Association of                           



                          Glomerular Filtration                           



                          Rate (GFR) and Staging                           



                          of Kidney Disease*                           



                          +---------------------                           



                          --+-------------------                           



                          --+-------------------                           



                          ------+| GFR                           



                          (mL/min/1.73 m2) ?|                           



                          With Kidney Damage ?|                           



                          ?Without Kidney                           



                          Damage+---------------                           



                          --------+-------------                           



                          --------+-------------                           



                          ------------+| ?>90 ?                           



                          ? ? ? ? ? ? ? ?|                           



                          ?Stage one ? ? ? ? ?|                           



                          ? Normal ? ? ? ? ? ? ?                           



                          ?+--------------------                           



                          ---+------------------                           



                          ---+------------------                           



                          -------+| ?60-89 ? ? ?                           



                          ? ? ? ? ?| ?Stage two                           



                          ? ? ? ? ?| ? Decreased                           



                          GFR ? ? ? ?                            



                          +---------------------                           



                          --+-------------------                           



                          --+-------------------                           



                          ------+| ?30-59 ? ? ?                           



                          ? ? ? ? ?| ?Stage                           



                          three ? ? ? ?| ? Stage                           



                          three ? ? ? ? ?                           



                          +---------------------                           



                          --+-------------------                           



                          --+-------------------                           



                          ------+| ?15-29 ? ? ?                           



                          ? ? ? ? ?| ?Stage four                           



                          ? ? ? ? | ? Stage four                           



                          ? ? ? ? ?                              



                          ?+--------------------                           



                          ---+------------------                           



                          ---+------------------                           



                          -------+| ?<15 (or                           



                          dialysis) ? ?| ?Stage                           



                          five ? ? ? ? | ? Stage                           



                          five ? ? ? ? ?                           



                          ?+--------------------                           



                          ---+------------------                           



                          ---+------------------                           



                          -------+ *Each stage                           



                          assumes the associated                           



                          GFR level has been in                           



                          effect for at least                           



                          three months. ?Stages                           



                          1 to 5, with or                           



                          without kidney                           



                          disease, indicate                           



                          chronic kidney                           



                          disease. Notes:                           



                          Determination of                           



                          stages one and two                           



                          (with eGFR                             



                          >59mL/min/1.73 m2)                           



                          requires estimation of                           



                          kidney damage for at                           



                          least three months as                           



                          defined by structural                           



                          or functional                           



                          abnormalities of the                           



                          kidney, manifested by                           



                          either:Pathological                           



                          abnormalities or                           



                          Markers of kidney                           



                          damage (including                           



                          abnormalities in the                           



                          composition of the                           



                          blood or urine or                           



                          abnormalities in                           



                          imaging tests).                           

 

             Lab Interpretation Abnormal                               



             (test code = 94074-5)                                        



[facility] METABOLIC PANEL (NA, K, CL, CO2, 
GLUCOSE, BUN, CREATININE, CA)2022 04:46:12





             Test Item    Value        Reference Range Interpretation Comments

 

             NA (test code = 136 mmol/L   135-145                   



             6588566061)                                         

 

             K (test code = 5.1 mmol/L   3.5-5        H            



             5855627009)                                         

 

             CL (test code = 106 mmol/L                       



             9583623351)                                         

 

             CO2 TOTAL (test code = 9 mmol/L     23-31        L            



             6041704385)                                         

 

             AGAP (test code =              2-16         H            



             6953536817)                                         

 

             BUN (test code = 11 mg/dL     7-23                      



             4105501514)                                         

 

             GLUCOSE (test code = 405 mg/dL           H            



             1161609780)                                         

 

             CREATININE (test code = 0.62 mg/dL   0.6-1.25                  



             7627704930)                                         

 

             CALCIUM (test code = 9.5 mg/dL    8.6-10.6                  



             4019544077)                                         

 

             eGFR (test code =              mL/min/1.73m2              



             1103628609)                                         

 

             MAL (test code = MAL) Association of                           



                          Glomerular Filtration                           



                          Rate (GFR) and Staging                           



                          of Kidney Disease*                           



                          +---------------------                           



                          --+-------------------                           



                          --+-------------------                           



                          ------+| GFR                           



                          (mL/min/1.73 m2) ?|                           



                          With Kidney Damage ?|                           



                          ?Without Kidney                           



                          Damage+---------------                           



                          --------+-------------                           



                          --------+-------------                           



                          ------------+| ?>90 ?                           



                          ? ? ? ? ? ? ? ?|                           



                          ?Stage one ? ? ? ? ?|                           



                          ? Normal ? ? ? ? ? ? ?                           



                          ?+--------------------                           



                          ---+------------------                           



                          ---+------------------                           



                          -------+| ?60-89 ? ? ?                           



                          ? ? ? ? ?| ?Stage two                           



                          ? ? ? ? ?| ? Decreased                           



                          GFR ? ? ? ?                            



                          +---------------------                           



                          --+-------------------                           



                          --+-------------------                           



                          ------+| ?30-59 ? ? ?                           



                          ? ? ? ? ?| ?Stage                           



                          three ? ? ? ?| ? Stage                           



                          three ? ? ? ? ?                           



                          +---------------------                           



                          --+-------------------                           



                          --+-------------------                           



                          ------+| ?15-29 ? ? ?                           



                          ? ? ? ? ?| ?Stage four                           



                          ? ? ? ? | ? Stage four                           



                          ? ? ? ? ?                              



                          ?+--------------------                           



                          ---+------------------                           



                          ---+------------------                           



                          -------+| ?<15 (or                           



                          dialysis) ? ?| ?Stage                           



                          five ? ? ? ? | ? Stage                           



                          five ? ? ? ? ?                           



                          ?+--------------------                           



                          ---+------------------                           



                          ---+------------------                           



                          -------+ *Each stage                           



                          assumes the associated                           



                          GFR level has been in                           



                          effect for at least                           



                          three months. ?Stages                           



                          1 to 5, with or                           



                          without kidney                           



                          disease, indicate                           



                          chronic kidney                           



                          disease. Notes:                           



                          Determination of                           



                          stages one and two                           



                          (with eGFR                             



                          >59mL/min/1.73 m2)                           



                          requires estimation of                           



                          kidney damage for at                           



                          least three months as                           



                          defined by structural                           



                          or functional                           



                          abnormalities of the                           



                          kidney, manifested by                           



                          either:Pathological                           



                          abnormalities or                           



                          Markers of kidney                           



                          damage (including                           



                          abnormalities in the                           



                          composition of the                           



                          blood or urine or                           



                          abnormalities in                           



                          imaging tests).                           

 

             Lab Interpretation Abnormal                               



             (test code = 36306-4)                                        



[id number]





             Test Item    Value        Reference Range Interpretation Comments

 

             PROTIME PATIENT (test              See_Comment                [Auto

mated message]



             code = 5964-2)                                        The system [employer]



                                                                 generated this 

result



                                                                 transmitted ref

erence



                                                                 range: 12.0 - 1

4.7



                                                                 Seconds. The re

ference



                                                                 range was not u

sed to



                                                                 interpret this 

result



                                                                 as normal/abnor

mal.

 

             INR (test code = 6301-6)                                        Nor

mal INR <1.1;



                                                                 Warfarin Therap

eutic



                                                                 range 2.0 to 3.

0 or



                                                                 2.5 to 3.5, dep

ending



                                                                 upon the indica

tions.

 

             Lab Interpretation (test Normal                                 



             code = 73085-9)                                        



[facility] [id number] 04:43:36





             Test Item    Value        Reference Range Interpretation Comments

 

             PROTIME PATIENT (test              See_Comment                [Auto

mated message]



             code = 5964-2)                                        The system [employer]



                                                                 generated this 

result



                                                                 transmitted ref

erence



                                                                 range: 12.0 - 1

4.7



                                                                 Seconds. The re

ference



                                                                 range was not u

sed to



                                                                 interpret this 

result



                                                                 as normal/abnor

mal.

 

             INR (test code = 6301-6)                                        Nor

mal INR <1.1;



                                                                 Warfarin Therap

eutic



                                                                 range 2.0 to 3.

0 or



                                                                 2.5 to 3.5, dep

ending



                                                                 upon the indica

tions.

 

             Lab Interpretation (test Normal                                 



             code = 61063-7)                                        



[facility] [id number] 04:43:36





             Test Item    Value        Reference Range Interpretation Comments

 

             PROTIME PATIENT (test              See_Comment                [Auto

mated message]



             code = 5964-2)                                        The system [employer]



                                                                 generated this 

result



                                                                 transmitted ref

erence



                                                                 range: 12.0 - 1

4.7



                                                                 Seconds. The re

ference



                                                                 range was not u

sed to



                                                                 interpret this 

result



                                                                 as normal/abnor

mal.

 

             INR (test code = 6301-6)                                        Nor

mal INR <1.1;



                                                                 Warfarin Therap

eutic



                                                                 range 2.0 to 3.

0 or



                                                                 2.5 to 3.5, dep

ending



                                                                 upon the indica

tions.

 

             Lab Interpretation (test Normal                                 



             code = 42279-8)                                        



[facility] (ALB,T.PRO,BILI
T,BU/BC,ALT,AST,ALK PHOS)2022 04:40:15





             Test Item    Value        Reference Range Interpretation Comments

 

             TOTAL BILI (test code = 2636172168) 0.9 mg/dL    0.1-1.1           

        

 

             BILI UNCON (test code = 6267029786) 0.3 mg/dL    0.1-1.1           

        

 

             BILI CONJ (test code = 5834686391) 0.0 mg/dL    0-0.3              

       

 

             T PROTEIN (test code = 0967098067) 7.5 g/dL     6.3-8.2            

       

 

             ALBUMIN (test code = 0250697046) 4.0 g/dL     3.5-5                

     

 

             ALK PHOS (test code = 6870039759) 166 U/L             H      

      

 

             ALTv (test code = 1742-6) 28 U/L       5-50                      

 

             AST(SGOT) (test code = 9428372253) 22 U/L       13-40              

       

 

             Lab Interpretation (test code = Abnormal                           

    



             69280-6)                                            



[facility]558[facility] (ALB,T.PRO,BILI
T,BU/BC,ALT,AST,ALK PHOS)2022 04:40:15





             Test Item    Value        Reference Range Interpretation Comments

 

             TOTAL BILI (test code = 1672848794) 0.9 mg/dL    0.1-1.1           

        

 

             BILI UNCON (test code = 9982831555) 0.3 mg/dL    0.1-1.1           

        

 

             BILI CONJ (test code = 7274369972) 0.0 mg/dL    0-0.3              

       

 

             T PROTEIN (test code = 9112213663) 7.5 g/dL     6.3-8.2            

       

 

             ALBUMIN (test code = 8161710736) 4.0 g/dL     3.5-5                

     

 

             ALK PHOS (test code = 0618062262) 166 U/L             H      

      

 

             ALTv (test code = 1742-6) 28 U/L       5-50                      

 

             AST(SGOT) (test code = 8029320126) 22 U/L       13-40              

       

 

             Lab Interpretation (test code = Abnormal                           

    



             01839-5)                                            



[facility] (ALB,T.PRO,BILI
T,BU/BC,ALT,AST,ALK PHOS)2022 04:40:15





             Test Item    Value        Reference Range Interpretation Comments

 

             TOTAL BILI (test code = 1016376874) 0.9 mg/dL    0.1-1.1           

        

 

             BILI UNCON (test code = 9976046316) 0.3 mg/dL    0.1-1.1           

        

 

             BILI CONJ (test code = 3055985295) 0.0 mg/dL    0-0.3              

       

 

             T PROTEIN (test code = 8135338068) 7.5 g/dL     6.3-8.2            

       

 

             ALBUMIN (test code = 8411575897) 4.0 g/dL     3.5-5                

     

 

             ALK PHOS (test code = 9936844054) 166 U/L             H      

      

 

             ALTv (test code = 1742-6) 28 U/L       5-50                      

 

             AST(SGOT) (test code = 2238879452) 22 U/L       13-40              

       

 

             Lab Interpretation (test code = Abnormal                           

    



             81903-0)                                            



[facility]





             Test Item    Value        Reference Range Interpretation Comments

 

             LIPASE (test code = 0093501516) 239 U/L      0-220        H        

    

 

             Lab Interpretation (test code = Abnormal                           

    



             64728-0)                                            



[facility]





             Test Item    Value        Reference Range Interpretation Comments

 

             LIPASE (test code = 0596420937) 239 U/L      0-220        H        

    

 

             Lab Interpretation (test code = Abnormal                           

    



             09698-1)                                            



[facility]:00





             Test Item    Value        Reference Range Interpretation Comments

 

             LIPASE (test code = 6454033461) 239 U/L      0-220        H        

    

 

             Lab Interpretation (test code = Abnormal                           

    



             76121-2)                                            



[facility] [id number] 02:01:26





             Test Item    Value        Reference Range Interpretation Comments

 

             Blood Culture-Aerobic No organisms No growth                 Previo

us



             (test code = 17928-3) isolated                               prelim

inary



                                                                 verified result



                                                                 was Culture In



                                                                 Progress on



                                                                 2022 at 00

02



                                                                 CDTPrevious



                                                                 preliminary



                                                                 verified result



                                                                 was No growth a

t



                                                                 24 hours on



                                                                 2022 at 21

01



                                                                 CDTPrevious



                                                                 preliminary



                                                                 verified result



                                                                 was No growth a

t



                                                                 48 hours on



                                                                 2022 at 21

01



                                                                 CDTPrevious



                                                                 preliminary



                                                                 verified result



                                                                 was No growth a

t



                                                                 72 hours on



                                                                 2022 at 21

01



                                                                 CDT

 

             Blood        No organisms No growth                 Previous



             Culture-Anaerobic isolated                               preliminar

y



             (test code = 13055-2)                                        verifi

ed result



                                                                 was Culture In



                                                                 Progress on



                                                                 2022 at 00

02



                                                                 CDTPrevious



                                                                 preliminary



                                                                 verified result



                                                                 was No growth a

t



                                                                 24 hours on



                                                                 2022 at 21

01



                                                                 CDTPrevious



                                                                 preliminary



                                                                 verified result



                                                                 was No growth a

t



                                                                 48 hours on



                                                                 2022 at 21

01



                                                                 CDTPrevious



                                                                 preliminary



                                                                 verified result



                                                                 was No growth a

t



                                                                 72 hours on



                                                                 2022 at 21

01



                                                                 CDT

 

             Lab Interpretation Normal                                 



             (test code = 14229-3)                                        



[facility] GLUCOSE (AUTOMATED)2022 19:39:16





             Test Item    Value        Reference Range Interpretation Comments

 

             POCT GLU (test code = 5667956551) 153 mg/dL           H      

      

 

             Lab Interpretation (test code = Abnormal                           

    



             01011-1)                                            



[facility] GLUCOSE (AUTOMATED)2022 16:42:59





             Test Item    Value        Reference Range Interpretation Comments

 

             POCT GLU (test code = 2597775481) 311 mg/dL           H      

      

 

             Lab Interpretation (test code = Abnormal                           

    



             03763-2)                                            



[facility] GLUCOSE (AUTOMATED)2022 01:48:17





             Test Item    Value        Reference Range Interpretation Comments

 

             POCT GLU (test code = 3314450415) 253 mg/dL           H      

      

 

             Lab Interpretation (test code = Abnormal                           

    



             40164-6)                                            



[facility] GLUCOSE (AUTOMATED)2022 21:48:07





             Test Item    Value        Reference Range Interpretation Comments

 

             POCT GLU (test code = 0324132240) 279 mg/dL           H      

      

 

             Lab Interpretation (test code = Abnormal                           

    



             46752-3)                                            



[facility] 16:48:09





             Test Item    Value        Reference Range Interpretation Comments

 

             POCT GLU (test code = 1204765837) 275 mg/dL           H      

      

 

             Lab Interpretation (test code = Abnormal                           

    



             21443-6)                                            



[facility]NA, K, CL, CO2, 
GLUCOSE, BUN, CREATININE, CA)2022 15:41:54





             Test Item    Value        Reference Range Interpretation Comments

 

             NA (test code = 148 mmol/L   135-145      H            



             1237265360)                                         

 

             K (test code = 4.3 mmol/L   3.5-5                     



             4316168705)                                         

 

             CL (test code = 123 mmol/L          H            



             1408402185)                                         

 

             CO2 TOTAL (test code = 19 mmol/L    23-31        L            



             8894574675)                                         

 

             AGAP (test code =              2-16                      



             9980488507)                                         

 

             BUN (test code = 23 mg/dL     7-23                      



             7799924836)                                         

 

             GLUCOSE (test code = 305 mg/dL           H            



             4904666604)                                         

 

             CREATININE (test code = 0.61 mg/dL   0.6-1.25                  



             0662516267)                                         

 

             CALCIUM (test code = 8.5 mg/dL    8.6-10.6     L            



             0643199898)                                         

 

             eGFR (test code =              mL/min/1.73m2              



             4694518561)                                         

 

             MAL (test code = MAL) Association of                           



                          Glomerular Filtration                           



                          Rate (GFR) and Staging                           



                          of Kidney Disease*                           



                          +---------------------                           



                          --+-------------------                           



                          --+-------------------                           



                          ------+| GFR                           



                          (mL/min/1.73 m2) ?|                           



                          With Kidney Damage ?|                           



                          ?Without Kidney                           



                          Damage+---------------                           



                          --------+-------------                           



                          --------+-------------                           



                          ------------+| ?>90 ?                           



                          ? ? ? ? ? ? ? ?|                           



                          ?Stage one ? ? ? ? ?|                           



                          ? Normal ? ? ? ? ? ? ?                           



                          ?+--------------------                           



                          ---+------------------                           



                          ---+------------------                           



                          -------+| ?60-89 ? ? ?                           



                          ? ? ? ? ?| ?Stage two                           



                          ? ? ? ? ?| ? Decreased                           



                          GFR ? ? ? ?                            



                          +---------------------                           



                          --+-------------------                           



                          --+-------------------                           



                          ------+| ?30-59 ? ? ?                           



                          ? ? ? ? ?| ?Stage                           



                          three ? ? ? ?| ? Stage                           



                          three ? ? ? ? ?                           



                          +---------------------                           



                          --+-------------------                           



                          --+-------------------                           



                          ------+| ?15-29 ? ? ?                           



                          ? ? ? ? ?| ?Stage four                           



                          ? ? ? ? | ? Stage four                           



                          ? ? ? ? ?                              



                          ?+--------------------                           



                          ---+------------------                           



                          ---+------------------                           



                          -------+| ?<15 (or                           



                          dialysis) ? ?| ?Stage                           



                          five ? ? ? ? | ? Stage                           



                          five ? ? ? ? ?                           



                          ?+--------------------                           



                          ---+------------------                           



                          ---+------------------                           



                          -------+ *Each stage                           



                          assumes the associated                           



                          GFR level has been in                           



                          effect for at least                           



                          three months. ?Stages                           



                          1 to 5, with or                           



                          without kidney                           



                          disease, indicate                           



                          chronic kidney                           



                          disease. Notes:                           



                          Determination of                           



                          stages one and two                           



                          (with eGFR                             



                          >59mL/min/1.73 m2)                           



                          requires estimation of                           



                          kidney damage for at                           



                          least three months as                           



                          defined by structural                           



                          or functional                           



                          abnormalities of the                           



                          kidney, manifested by                           



                          either:Pathological                           



                          abnormalities or                           



                          Markers of kidney                           



                          damage (including                           



                          abnormalities in the                           



                          composition of the                           



                          blood or urine or                           



                          abnormalities in                           



                          imaging tests).                           

 

             Lab Interpretation Abnormal                               



             (test code = 48167-5)                                        



[facility]17:07





             Test Item    Value        Reference Range Interpretation Comments

 

             MAGNESIUM (test code = 8354235070) 1.8 mg/dL    1.7-2.4            

       

 

             Lab Interpretation (test code = Normal                             

    



             26581-3)                                            



[facility] 15:17:07





             Test Item    Value        Reference Range Interpretation Comments

 

             PHOSPHORUS (test code = 5113181295) 2.2 mg/dL    2.5-5        L    

        

 

             Lab Interpretation (test code = Abnormal                           

    



             68046-9)                                            



[facility] GLUCOSE (AUTOMATED)2022 12:48:51





             Test Item    Value        Reference Range Interpretation Comments

 

             POCT GLU (test code = 6836876886) 274 mg/dL           H      

      

 

             Lab Interpretation (test code = Abnormal                           

    



             24030-6)                                            



[facility] GLUCOSE (AUTOMATED)2022 01:10:30





             Test Item    Value        Reference Range Interpretation Comments

 

             POCT GLU (test code = 5546308913) 248 mg/dL           H      

      

 

             Lab Interpretation (test code = Abnormal                           

    



             70309-2)                                            



[facility] GLUCOSE (AUTOMATED)2022 01:10:30





             Test Item    Value        Reference Range Interpretation Comments

 

             POCT GLU (test code = 8293467441) 300 mg/dL           H      

      

 

             Lab Interpretation (test code = Abnormal                           

    



             18991-2)                                            



[facility] GLUCOSE (AUTOMATED)2022 16:52:44





             Test Item    Value        Reference Range Interpretation Comments

 

             POCT GLU (test code = 4982339297) 296 mg/dL           H      

      

 

             Lab Interpretation (test code = Abnormal                           

    



             23278-1)                                            



[facility] GLUCOSE (AUTOMATED)2022 16:52:43





             Test Item    Value        Reference Range Interpretation Comments

 

             POCT GLU (test code = 6303374937) 345 mg/dL           H      

      

 

             Lab Interpretation (test code = Abnormal                           

    



             49071-5)                                            



[facility]38





             Test Item    Value        Reference Range Interpretation Comments

 

             POCT GLU (test code = 6564745793) 363 mg/dL           H      

      

 

             Lab Interpretation (test code = Abnormal                           

    



             80573-0)                                            



[facility] GLUCOSE (AUTOMATED)2022 16:52:38





             Test Item    Value        Reference Range Interpretation Comments

 

             POCT GLU (test code = 3196960318) 444 mg/dL           H      

      

 

             Lab Interpretation (test code = Abnormal                           

    



             93380-2)                                            



[facility] GLUCOSE (AUTOMATED)2022 16:52:38





             Test Item    Value        Reference Range Interpretation Comments

 

             POCT GLU (test code = 9660252568) 324 mg/dL           H      

      

 

             Lab Interpretation (test code = Abnormal                           

    



             55231-0)                                            



[facility] GLUCOSE (AUTOMATED)2022 16:52:38





             Test Item    Value        Reference Range Interpretation Comments

 

             POCT GLU (test code = 3667374687) 303 mg/dL           H      

      

 

             Lab Interpretation (test code = Abnormal                           

    



             12478-4)                                            



[facility] GLUCOSE (AUTOMATED)2022 16:52:38





             Test Item    Value        Reference Range Interpretation Comments

 

             POCT GLU (test code = 6264381299) 288 mg/dL           H      

      

 

             Lab Interpretation (test code = Abnormal                           

    



             07037-2)                                            



[facility] GLUCOSE (AUTOMATED)2022 16:37:12





             Test Item    Value        Reference Range Interpretation Comments

 

             POCT GLU (test code = 8488092792) 241 mg/dL           H      

      

 

             Lab Interpretation (test code = Abnormal                           

    



             96644-9)                                            



[facility] GLUCOSE (AUTOMATED)2022 13:14:11





             Test Item    Value        Reference Range Interpretation Comments

 

             POCT GLU (test code = 7613743424) 264 mg/dL           H      

      

 

             Lab Interpretation (test code = Abnormal                           

    



             57406-8)                                            



[facility] GLUCOSE (AUTOMATED)2022 11:04:52





             Test Item    Value        Reference Range Interpretation Comments

 

             POCT GLU (test code = 9642707150) 257 mg/dL           H      

      

 

             Lab Interpretation (test code = Abnormal                           

    



             40059-6)                                            



[facility] GLUCOSE (AUTOMATED)2022 07:51:27





             Test Item    Value        Reference Range Interpretation Comments

 

             POCT GLU (test code = 0994493253) 228 mg/dL           H      

      

 

             Lab Interpretation (test code = Abnormal                           

    



             43334-3)                                            



[facility] GLUCOSE (AUTOMATED)2022 03:43:14





             Test Item    Value        Reference Range Interpretation Comments

 

             POCT GLU (test code = 1388249271) 269 mg/dL           H      

      

 

             Lab Interpretation (test code = Abnormal                           

    



             95448-6)                                            



[facility] GLUCOSE (AUTOMATED)2022 00:53:27





             Test Item    Value        Reference Range Interpretation Comments

 

             POCT GLU (test code = 0750208693) 342 mg/dL           H      

      

 

             Lab Interpretation (test code = Abnormal                           

    



             50746-3)                                            



[facility] Metabolic Panel (Na, K, Cl, CO2, 
Glucose, BUN, Creatinine, Ca)2022 00:26:35





             Test Item    Value        Reference Range Interpretation Comments

 

             NA (test code = 161 mmol/L   135-145      HH           



             [id number])                                         

 

             K (test code = 5.3 mmol/L   3.5-5        H            



             8013908122)                                         

 

             CL (test code = 131 mmol/L          H            



             5457864498)                                         

 

             CO2 TOTAL (test code = 14 mmol/L    23-31        L            



             0958026466)                                         

 

             AGAP (test code =              2-16                      



             6221398719)                                         

 

             BUN (test code = 40 mg/dL     7-23         H            



             8984625158)                                         

 

             GLUCOSE (test code = 363 mg/dL           H            



             2303810871)                                         

 

             CREATININE (test code = 1.31 mg/dL   0.6-1.25     H            



             6354763378)                                         

 

             CALCIUM (test code = 9.0 mg/dL    8.6-10.6                  



             7856179015)                                         

 

             eGFR (test code =              mL/min/1.73m2              



             3520147469)                                         

 

             MAL (test code = MAL) Association of                           



                          Glomerular Filtration                           



                          Rate (GFR) and Staging                           



                          of Kidney Disease*                           



                          +---------------------                           



                          --+-------------------                           



                          --+-------------------                           



                          ------+| GFR                           



                          (mL/min/1.73 m2) ?|                           



                          With Kidney Damage ?|                           



                          ?Without Kidney                           



                          Damage+---------------                           



                          --------+-------------                           



                          --------+-------------                           



                          ------------+| ?>90 ?                           



                          ? ? ? ? ? ? ? ?|                           



                          ?Stage one ? ? ? ? ?|                           



                          ? Normal ? ? ? ? ? ? ?                           



                          ?+--------------------                           



                          ---+------------------                           



                          ---+------------------                           



                          -------+| ?60-89 ? ? ?                           



                          ? ? ? ? ?| ?Stage two                           



                          ? ? ? ? ?| ? Decreased                           



                          GFR ? ? ? ?                            



                          +---------------------                           



                          --+-------------------                           



                          --+-------------------                           



                          ------+| ?30-59 ? ? ?                           



                          ? ? ? ? ?| ?Stage                           



                          three ? ? ? ?| ? Stage                           



                          three ? ? ? ? ?                           



                          +---------------------                           



                          --+-------------------                           



                          --+-------------------                           



                          ------+| ?15-29 ? ? ?                           



                          ? ? ? ? ?| ?Stage four                           



                          ? ? ? ? | ? Stage four                           



                          ? ? ? ? ?                              



                          ?+--------------------                           



                          ---+------------------                           



                          ---+------------------                           



                          -------+| ?<15 (or                           



                          dialysis) ? ?| ?Stage                           



                          five ? ? ? ? | ? Stage                           



                          five ? ? ? ? ?                           



                          ?+--------------------                           



                          ---+------------------                           



                          ---+------------------                           



                          -------+ *Each stage                           



                          assumes the associated                           



                          GFR level has been in                           



                          effect for at least                           



                          three months. ?Stages                           



                          1 to 5, with or                           



                          without kidney                           



                          disease, indicate                           



                          chronic kidney                           



                          disease. Notes:                           



                          Determination of                           



                          stages one and two                           



                          (with eGFR                             



                          >59mL/min/1.73 m2)                           



                          requires estimation of                           



                          kidney damage for at                           



                          least three months as                           



                          defined by structural                           



                          or functional                           



                          abnormalities of the                           



                          kidney, manifested by                           



                          either:Pathological                           



                          abnormalities or                           



                          Markers of kidney                           



                          damage (including                           



                          abnormalities in the                           



                          composition of the                           



                          blood or urine or                           



                          abnormalities in                           



                          imaging tests).                           

 

             Lab Interpretation Abnormal                               



             (test code = 52703-1)                                        



[facility] GLUCOSE (AUTOMATED)2022 22:31:31





             Test Item    Value        Reference Range Interpretation Comments

 

             POCT GLU (test code = 1246406947) 349 mg/dL           H      

      

 

             Lab Interpretation (test code = Abnormal                           

    



             59240-9)                                            



[facility] GLUCOSE (AUTOMATED)2022 20:57:05





             Test Item    Value        Reference Range Interpretation Comments

 

             POCT GLU (test code = 6545607836)                     HH     

      

 

             Lab Interpretation (test code = Abnormal                           

    



             96165-3)                                            



[facility] GLUCOSE (AUTOMATED)2022 20:57:00





             Test Item    Value        Reference Range Interpretation Comments

 

             POCT GLU (test code = 2836089298)                     HH     

      

 

             Lab Interpretation (test code = Abnormal                           

    



             24344-6)                                            



[facility] GLUCOSE (AUTOMATED)2022 20:57:00





             Test Item    Value        Reference Range Interpretation Comments

 

             POCT GLU (test code = 3261432482)                     HH     

      

 

             Lab Interpretation (test code = Abnormal                           

    



             97916-0)                                            



[facility] 20:57:00





             Test Item    Value        Reference Range Interpretation Comments

 

             POCT GLU (test code = 7848388499)                     HH     

      

 

             Lab Interpretation (test code = Abnormal                           

    



             17725-6)                                            



[facility] [id number] 12:58:42





             Test Item    Value        Reference Range Interpretation Comments

 

             LACTIC ACID (test code = 4.32 mmol/L  0.5-2.2      H            



             6613620716)                                         

 

             Lab Interpretation (test code = Abnormal                           

    



             85998-3)                                            



[facility] [id number] 03:51:48





             Test Item    Value        Reference Range Interpretation Comments

 

             PHOSPHORUS (test code = 6.7 mg/dL    2.5-5        H            Slig

ht hemolysis



             8628355715)                                         

 

             Lab Interpretation (test Abnormal                               



             code = 34352-4)                                        



[facility] [id number] 03:51:48





             Test Item    Value        Reference Range Interpretation Comments

 

             MAGNESIUM (test code = 6123045811) 2.8 mg/dL    1.7-2.4      H     

       

 

             Lab Interpretation (test code = Abnormal                           

    



             13901-3)                                            



[facility] ([id number])2022 
02:16:52





             Test Item    Value        Reference Range Interpretation Comments

 

             NA (test code = 166 mmol/L   135-145      HH           



             3007645781)                                         

 

             K (test code = 5.2 mmol/L   3.5-5        H            



             3270934925)                                         

 

             CL (test code = 123 mmol/L          H            



             9712190184)                                         

 

             CO2 TOTAL (test code = 12 mmol/L    23-31        L            



             6372654236)                                         

 

             AGAP (test code =              2-16         H            



             7641514286)                                         

 

             BUN (test code = 35 mg/dL     7-23         H            



             8594303327)                                         

 

             GLUCOSE (test code = 941 mg/dL           HH           



             8044355200)                                         

 

             CREATININE (test code = 1.51 mg/dL   0.6-1.25     H            



             5655289202)                                         

 

             TOTAL BILI (test code = 1.1 mg/dL    0.1-1.1                   



             5501464788)                                         

 

             CALCIUM (test code = 10.7 mg/dL   8.6-10.6     H            



             4960265546)                                         

 

             T PROTEIN (test code = 8.2 g/dL     6.3-8.2                   



             1891037513)                                         

 

             ALBUMIN (test code = 4.5 g/dL     3.5-5                     



             0965354941)                                         

 

             ALK PHOS (test code = 201 U/L             H            



             6427680118)                                         

 

             ALTv (test code = 43 U/L       5-50                      



             1742-6)                                             

 

             AST(SGOT) (test code = 27 U/L       13-40                     



             6086411577)                                         

 

             eGFR (test code =              mL/min/1.73m2              



             7679717764)                                         

 

             MAL (test code = MAL) Association of                           



                          Glomerular Filtration                           



                          Rate (GFR) and Staging                           



                          of Kidney Disease*                           



                          +---------------------                           



                          --+-------------------                           



                          --+-------------------                           



                          ------+| GFR                           



                          (mL/min/1.73 m2) ?|                           



                          With Kidney Damage ?|                           



                          ?Without Kidney                           



                          Damage+---------------                           



                          --------+-------------                           



                          --------+-------------                           



                          ------------+| ?>90 ?                           



                          ? ? ? ? ? ? ? ?|                           



                          ?Stage one ? ? ? ? ?|                           



                          ? Normal ? ? ? ? ? ? ?                           



                          ?+--------------------                           



                          ---+------------------                           



                          ---+------------------                           



                          -------+| ?60-89 ? ? ?                           



                          ? ? ? ? ?| ?Stage two                           



                          ? ? ? ? ?| ? Decreased                           



                          GFR ? ? ? ?                            



                          +---------------------                           



                          --+-------------------                           



                          --+-------------------                           



                          ------+| ?30-59 ? ? ?                           



                          ? ? ? ? ?| ?Stage                           



                          three ? ? ? ?| ? Stage                           



                          three ? ? ? ? ?                           



                          +---------------------                           



                          --+-------------------                           



                          --+-------------------                           



                          ------+| ?15-29 ? ? ?                           



                          ? ? ? ? ?| ?Stage four                           



                          ? ? ? ? | ? Stage four                           



                          ? ? ? ? ?                              



                          ?+--------------------                           



                          ---+------------------                           



                          ---+------------------                           



                          -------+| ?<15 (or                           



                          dialysis) ? ?| ?Stage                           



                          five ? ? ? ? | ? Stage                           



                          five ? ? ? ? ?                           



                          ?+--------------------                           



                          ---+------------------                           



                          ---+------------------                           



                          -------+ *Each stage                           



                          assumes the associated                           



                          GFR level has been in                           



                          effect for at least                           



                          three months. ?Stages                           



                          1 to 5, with or                           



                          without kidney                           



                          disease, indicate                           



                          chronic kidney                           



                          disease. Notes:                           



                          Determination of                           



                          stages one and two                           



                          (with eGFR                             



                          >59mL/min/1.73 m2)                           



                          requires estimation of                           



                          kidney damage for at                           



                          least three months as                           



                          defined by structural                           



                          or functional                           



                          abnormalities of the                           



                          kidney, manifested by                           



                          either:Pathological                           



                          abnormalities or                           



                          Markers of kidney                           



                          damage (including                           



                          abnormalities in the                           



                          composition of the                           



                          blood or urine or                           



                          abnormalities in                           



                          imaging tests).                           

 

             Lab Interpretation Abnormal                               



             (test code = 05042-1)                                        



[facility] 02:12:40





             Test Item    Value        Reference    Interpretation Comments



                                       Range                     

 

             TROPONIN I (test 0.005 ng/mL  See_Comment                [Automated



             code = 6602825337)                                        message] 

The



                                                                 system which



                                                                 generated this



                                                                 result



                                                                 transmitted



                                                                 reference range

:



                                                                 <=0.034. The



                                                                 reference range



                                                                 was not used to



                                                                 interpret this



                                                                 result as



                                                                 normal/abnormal

.

 

             MAL (test code = Reference (Normal)                           



             MAL)         Range (defined by                           



                          the 99th percentile                           



                          reference limit): <=                           



                          0.034 ng/mL Note:                           



                          Cardiac troponin                           



                          begins to rise 3-4                           



                          hours after the                           



                          onset of ischemia.                           



                          Repeat in 4-6 hours                           



                          if the sample was                           



                          drawn within 3-4                           



                          hours of the onset                           



                          of the symptom and                           



                          found normal.                           



                          Diagnosis of                           



                          myocardial injury is                           



                          made with acute                           



                          changes in cTn                           



                          concentrations with                           



                          at least one serial                           



                          sample above the                           



                          99th percentile                           



                          upper reference                           



                          limit (URL), taken                           



                          together with the                           



                          patient's clinical                           



                          presentation. Biotin                           



                          has been reported to                           



                          cause a negative                           



                          bias, interpret                           



                          results relative to                           



                          patient's use of                           



                          biotin.                                

 

             Lab Interpretation Normal                                 



             (test code =                                        



             79387-6)                                            



[facility] 02:06:11
SALICYLATE&lt;10mg/ 9:06 PM [facility] 
LABORATORYTherapeutic Range: ? ?? ? ? Analgesic and Antipyretic Use ? ? ? ? 20-
100 mg/L ? ? Anti-Inflammatory Use ? ? ? ? ? ? ? ? 100-250 mg/L Toxic Range: ? ?
? ? ? ? ? ? ? ? ? ? ? ? ? Greater than 300 mg/LUnWise Health Surgical [facility] at ParkwaySALICYLATE2022-07-27 02:06:11SALICYLATE&lt;10mg/ 9:06 PM 
[facility] LABORATORYTherapeutic Range: ? ?? ? ? Analgesic and
Antipyretic Use ? ? ? ?  mg/L ? ? Anti-Inflammatory Use ? ? ? ? ? ? ? ? 
100-250 mg/L Toxic Range: ? ? ? ? ? ? ? ? ? ? ? ? ? ? ? Greater than 300 mg/L
[facility]ETHANOL2022-[facility] 02:05:51
ALCOHOL&lt;10mg/dL2022 9:05 PM [facility] 
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
[facility] HospitalETHANOL2022-[facility]27 02:05:51
ALCOHOL&lt;10mg/dL2022 9:05 PM [facility] 
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
[facility]ortedUnW[facility] ParkwayACETAMINOPHEN2022-07-27 02:03:04





             Test Item    Value        Reference Range Interpretation Comments

 

             ACETAMINOP (test code =              10-30        L            



             7664526879)                                         

 

             MAL (test code = MAL) Toxic: Greater than                          

 



                          200 ug/mL @ 4 hour                           



                          post ingestion or                           



                          greater than 50                           



                          ug/mL @ 12 hour post                           



                          ingestion                              

 

             Lab Interpretation (test Abnormal                               



             code = 23555-8)                                        



[facility] HospitalACETAMINOPHEN2022-07-27 02:03:04





             Test Item    Value        Reference Range Interpretation Comments

 

             ACETAMINOP (test code =              10-30        L            



             7436616115)                                         

 

             MAL (test code = MAL) Toxic: Greater than                          

 



                          200 ug/mL @ 4 hour                           



                          post ingestion or                           



                          greater than 50                           



                          ug/mL @ 12 hour post                           



                          ingestion                              

 

             Lab Interpretation (test Abnormal                               



             code = 11931-8)                                        



[facility] 02:01:02





             Test Item    Value        Reference Range Interpretation Comments

 

             LIPASE (test code = 1887644873) 246 U/L      0-220        H        

    

 

             Lab Interpretation (test code = Abnormal                           

    



             13845-0)                                            



[facility] WITH [id number] 01:12:41





             Test Item    Value        Reference Range Interpretation Comments

 

             WBC (test code =              See_Comment  H             [Automated



             5890-2)                                             message] The



                                                                 system which



                                                                 generated this



                                                                 result transmit

huma



                                                                 reference range

:



                                                                 4.20 - 10.70



                                                                 10*3/?L. The



                                                                 reference range



                                                                 was not used to



                                                                 interpret this



                                                                 result as



                                                                 normal/abnormal

.

 

             RBC (test code =              See_Comment  H             [Automated



             729-8)                                              message] The



                                                                 system which



                                                                 generated this



                                                                 result transmit

huma



                                                                 reference range

:



                                                                 4.26 - 5.52



                                                                 10*6/?L. The



                                                                 reference range



                                                                 was not used to



                                                                 interpret this



                                                                 result as



                                                                 normal/abnormal

.

 

             HGB (test code = 18.3 g/dL    12.2-16.4    H            



             718-7)                                              

 

             HCT (test code = 57.6 %       38.4-49.3    H            



             4544-3)                                             

 

             MCV (test code = 90.7 fL      81.7-95.6                 



             787-2)                                              

 

             MCH (test code = 28.8 pg      26.1-32.7                 



             785-6)                                              

 

             MCHC (test code = 31.8 g/dL    31.2-35                   



             786-4)                                              

 

             RDW-SD (test code = 50.8 fL      38.5-51.6                 



             07438-5)                                            

 

             RDW-CV (test code = 16.4 %       12.1-15.4    H            



             788-0)                                              

 

             PLT (test code =              See_Comment  H             [Automated



             777-3)                                              message] The



                                                                 system which



                                                                 generated this



                                                                 result transmit

huma



                                                                 reference range

:



                                                                 150 - 328 10*3/

?L.



                                                                 The reference



                                                                 range was not u

sed



                                                                 to interpret th

is



                                                                 result as



                                                                 normal/abnormal

.

 

             MPV (test code = 11.6 fL      9.8-13                    



             86935-4)                                            

 

             NRBC/100 WBC (test              See_Comment                [Automat

ed



             code = 8657335105)                                        message] 

The



                                                                 system which



                                                                 generated this



                                                                 result transmit

huma



                                                                 reference range

:



                                                                 0.0 - 10.0 /100



                                                                 WBCs. The



                                                                 reference range



                                                                 was not used to



                                                                 interpret this



                                                                 result as



                                                                 normal/abnormal

.

 

             NRBC x10^3 (test code              See_Comment                [Auto

mated



             = 5794984588)                                        message] The



                                                                 system which



                                                                 generated this



                                                                 result transmit

huma



                                                                 reference range

:



                                                                 10*3/?L. The



                                                                 reference range



                                                                 was not used to



                                                                 interpret this



                                                                 result as



                                                                 normal/abnormal

.

 

             SEG % (test code = 77 %         33-76        H            



             90889-9)                                            

 

             BAND % (test code = 9 %          0-1          H            



             87428-2)                                            

 

             LYMPH % (test code = 7 %          14-54        L            



             63016-3)                                            

 

             MONO % (test code = 7 %          0-4          H            



             26485-3)                                            

 

             ANC (test code = 15.98 10*3/uL 1.99-6.95    H            



             753-4)                                              

 

             OLENA CELLS (test code 2+           See_Comment  A             [Auto

mated



             = 7790-9)                                           message] The



                                                                 system which



                                                                 generated this



                                                                 result transmit

huma



                                                                 reference range

:



                                                                 (none). The



                                                                 reference range



                                                                 was not used to



                                                                 interpret this



                                                                 result as



                                                                 normal/abnormal

.

 

             Lab Interpretation Abnormal                               



             (test code = 12633-5)                                        



[facility] GLUCOSE (AUTOMATED)2022 01:30:22





             Test Item    Value        Reference Range Interpretation Comments

 

             POCT GLU (test code = 5787774082) 392 mg/dL           H      

      

 

             Lab Interpretation (test code = Abnormal                           

    



             38474-3)                                            



[facility] GLUCOSE(AGE &gt;30DAYS)2022 
01:30:00





             Test Item    Value        Reference Range Interpretation Comments

 

             POCT Glu (age>30days) 392 mg/dL,                      



             (test code = 3342) Jader informed                           

 

             Lab Interpretation (test Normal                                 



             code = 04767-2)                                        



[facility] GLUCOSE (AUTOMATED)2022 00:43:22





             Test Item    Value        Reference Range Interpretation Comments

 

             POCT GLU (test code = 1939742842) 430 mg/dL           H      

      

 

             Lab Interpretation (test code = Abnormal                           

    



             24016-4)                                            



[facility] GLUCOSE (AUTOMATED)2022-07-15 23:29:00





             Test Item    Value        Reference Range Interpretation Comments

 

             POCT GLU (test code = 4618466400) 493 mg/dL           HH     

      

 

             Lab Interpretation (test code = Abnormal                           

    



             90533-5)                                            



[facility] METABOLIC PANEL (NA, K, CL, CO2, 
GLUCOSE, BUN, CREATININE, CA)2022-07-15 22:56:10





             Test Item    Value        Reference Range Interpretation Comments

 

             NA (test code = 132 mmol/L   135-145      L            



             3133931638)                                         

 

             K (test code = 4.4 mmol/L   3.5-5                     



             3002013321)                                         

 

             CL (test code = 98 mmol/L                        



             0821357098)                                         

 

             CO2 TOTAL (test code = 19 mmol/L    23-31        L            



             7731896081)                                         

 

             AGAP (test code =              2-16                      



             3253249934)                                         

 

             BUN (test code = 11 mg/dL     7-23                      



             2721066067)                                         

 

             GLUCOSE (test code = 519 mg/dL           HH           



             5020340491)                                         

 

             CREATININE (test code = 0.55 mg/dL   0.6-1.25     L            



             2704584413)                                         

 

             CALCIUM (test code = 9.5 mg/dL    8.6-10.6                  



             8468493165)                                         

 

             eGFR (test code =              mL/min/1.73m2              



             4314424258)                                         

 

             MAL (test code = MAL) Association of                           



                          Glomerular Filtration                           



                          Rate (GFR) and Staging                           



                          of Kidney Disease*                           



                          +---------------------                           



                          --+-------------------                           



                          --+-------------------                           



                          ------+| GFR                           



                          (mL/min/1.73 m2) ?|                           



                          With Kidney Damage ?|                           



                          ?Without Kidney                           



                          Damage+---------------                           



                          --------+-------------                           



                          --------+-------------                           



                          ------------+| ?>90 ?                           



                          ? ? ? ? ? ? ? ?|                           



                          ?Stage one ? ? ? ? ?|                           



                          ? Normal ? ? ? ? ? ? ?                           



                          ?+--------------------                           



                          ---+------------------                           



                          ---+------------------                           



                          -------+| ?60-89 ? ? ?                           



                          ? ? ? ? ?| ?Stage two                           



                          ? ? ? ? ?| ? Decreased                           



                          GFR ? ? ? ?                            



                          +---------------------                           



                          --+-------------------                           



                          --+-------------------                           



                          ------+| ?30-59 ? ? ?                           



                          ? ? ? ? ?| ?Stage                           



                          three ? ? ? ?| ? Stage                           



                          three ? ? ? ? ?                           



                          +---------------------                           



                          --+-------------------                           



                          --+-------------------                           



                          ------+| ?15-29 ? ? ?                           



                          ? ? ? ? ?| ?Stage four                           



                          ? ? ? ? | ? Stage four                           



                          ? ? ? ? ?                              



                          ?+--------------------                           



                          ---+------------------                           



                          ---+------------------                           



                          -------+| ?<15 (or                           



                          dialysis) ? ?| ?Stage                           



                          five ? ? ? ? | ? Stage                           



                          five ? ? ? ? ?                           



                          ?+--------------------                           



                          ---+------------------                           



                          ---+------------------                           



                          -------+ *Each stage                           



                          assumes the associated                           



                          GFR level has been in                           



                          effect for at least                           



                          three months. ?Stages                           



                          1 to 5, with or                           



                          without kidney                           



                          disease, indicate                           



                          chronic kidney                           



                          disease. Notes:                           



                          Determination of                           



                          stages one and two                           



                          (with eGFR                             



                          >59mL/min/1.73 m2)                           



                          requires estimation of                           



                          kidney damage for at                           



                          least three months as                           



                          defined by structural                           



                          or functional                           



                          abnormalities of the                           



                          kidney, manifested by                           



                          either:Pathological                           



                          abnormalities or                           



                          Markers of kidney                           



                          damage (including                           



                          abnormalities in the                           



                          composition of the                           



                          blood or urine or                           



                          abnormalities in                           



                          imaging tests).                           

 

             Lab Interpretation Abnormal                               



             (test code = 48583-3)                                        



[facility] WITH DIFF2022-07-15 22:45:19





             Test Item    Value        Reference Range Interpretation Comments

 

             WBC (test code =              See_Comment                [Automated



             0906-2)                                             message] The sy

stem



                                                                 which generated



                                                                 this result



                                                                 transmitted



                                                                 reference range

:



                                                                 4.20 - 10.70



                                                                 10*3/?L. The



                                                                 reference range

 was



                                                                 not used to



                                                                 interpret this



                                                                 result as



                                                                 normal/abnormal

.

 

             RBC (test code =              See_Comment                [Automated



             275-8)                                              message] The sy

stem



                                                                 which generated



                                                                 this result



                                                                 transmitted



                                                                 reference range

:



                                                                 4.26 - 5.52



                                                                 10*6/?L. The



                                                                 reference range

 was



                                                                 not used to



                                                                 interpret this



                                                                 result as



                                                                 normal/abnormal

.

 

             HGB (test code = 15.7 g/dL    12.2-16.4                 



             718-7)                                              

 

             HCT (test code = 46.0 %       38.4-49.3                 



             4544-3)                                             

 

             MCV (test code = 85.0 fL      81.7-95.6                 



             787-2)                                              

 

             MCH (test code = 29.0 pg      26.1-32.7                 



             785-6)                                              

 

             MCHC (test code = 34.1 g/dL    31.2-35                   



             786-4)                                              

 

             RDW-SD (test code = 41.7 fL      38.5-51.6                 



             61389-8)                                            

 

             RDW-CV (test code = 13.6 %       12.1-15.4                 



             788-0)                                              

 

             PLT (test code =              See_Comment  H             [Automated



             777-3)                                              message] The sy

stem



                                                                 which generated



                                                                 this result



                                                                 transmitted



                                                                 reference range

:



                                                                 150 - 328 10*3/

?L.



                                                                 The reference r

zhen



                                                                 was not used to



                                                                 interpret this



                                                                 result as



                                                                 normal/abnormal

.

 

             MPV (test code = 10.6 fL      9.8-13                    



             23632-6)                                            

 

             NRBC/100 WBC (test              See_Comment                [Automat

ed



             code = 6079611613)                                        message] 

The system



                                                                 which generated



                                                                 this result



                                                                 transmitted



                                                                 reference range

:



                                                                 0.0 - 10.0 /100



                                                                 WBCs. The refer

ence



                                                                 range was not u

sed



                                                                 to interpret th

is



                                                                 result as



                                                                 normal/abnormal

.

 

             NRBC x10^3 (test code              See_Comment                [Auto

mated



             = [id number])                                        message] The s

ystem



                                                                 which generated



                                                                 this result



                                                                 transmitted



                                                                 reference range

:



                                                                 10*3/?L. The



                                                                 reference range

 was



                                                                 not used to



                                                                 interpret this



                                                                 result as



                                                                 normal/abnormal

.

 

             GRAN MAT (NEUT) % 66.6 %                                 



             (test code = 770-8)                                        

 

             IMM GRAN % (test code 0.40 %                                 



             = 1587101564)                                        

 

             LYMPH % (test code = 20.5 %                                 



             736-9)                                              

 

             MONO % (test code = 10.9 %                                 



             5905-5)                                             

 

             EOS % (test code = 1.5 %                                  



             713-8)                                              

 

             BASO % (test code = 0.1 %                                  



             706-2)                                              

 

             GRAN MAT x10^3(ANC) 4.88 10*3/uL 1.99-6.95                 



             (test code =                                        



             8077595523)                                         

 

             IMM GRAN x10^3 (test 0.03 10*3/uL 0-0.06                    



             code = 3515055213)                                        

 

             LYMPH x10^3 (test code 1.50 10*3/uL 1.09-3.23                 



             = 731-0)                                            

 

             MONO x10^3 (test code 0.80 10*3/uL 0.36-1.02                 



             = 742-7)                                            

 

             EOS x10^3 (test code = 0.11 10*3/uL 0.06-0.53                 



             711-2)                                              

 

             BASO x10^3 (test code              0.01-0.09                 



             = 704-7)                                            

 

             Lab Interpretation Abnormal                               



             (test code = 09089-1)                                        



[facility] WITH LAFY2478-07-23 11:05:43





             Test Item    Value        Reference Range Interpretation Comments

 

             WBC (test code =              See_Comment  H             [Automated



             6690-2)                                             message] The sy

stem



                                                                 which generated



                                                                 this result



                                                                 transmitted



                                                                 reference range

:



                                                                 4.20 - 10.70



                                                                 10*3/?L. The



                                                                 reference range

 was



                                                                 not used to



                                                                 interpret this



                                                                 result as



                                                                 normal/abnormal

.

 

             RBC (test code =              See_Comment                [Automated



             789-8)                                              message] The sy

stem



                                                                 which generated



                                                                 this result



                                                                 transmitted



                                                                 reference range

:



                                                                 4.26 - 5.52



                                                                 10*6/?L. The



                                                                 reference range

 was



                                                                 not used to



                                                                 interpret this



                                                                 result as



                                                                 normal/abnormal

.

 

             HGB (test code = 15.6 g/dL    12.2-16.4                 



             718-7)                                              

 

             HCT (test code = 46.3 %       38.4-49.3                 



             4544-3)                                             

 

             MCV (test code = 85.4 fL      81.7-95.6                 



             787-2)                                              

 

             MCH (test code = 28.8 pg      26.1-32.7                 



             785-6)                                              

 

             MCHC (test code = 33.7 g/dL    31.2-35.0                 



             786-4)                                              

 

             RDW-SD (test code = 41.7 fL      38.5-51.6                 



             20301-0)                                            

 

             RDW-CV (test code = 13.4 %       12.1-15.4                 



             788-0)                                              

 

             PLT (test code =              See_Comment  H             [Automated



             777-3)                                              message] The sy

stem



                                                                 which generated



                                                                 this result



                                                                 transmitted



                                                                 reference range

:



                                                                 150 - 328 10*3/

?L.



                                                                 The reference r

zhen



                                                                 was not used to



                                                                 interpret this



                                                                 result as



                                                                 normal/abnormal

.

 

             MPV (test code = 10.3 fL      9.8-13.0                  



             17577-3)                                            

 

             NRBC/100 WBC (test              See_Comment                [Automat

ed



             code = 9506280179)                                        message] 

The system



                                                                 which generated



                                                                 this result



                                                                 transmitted



                                                                 reference range

:



                                                                 0.0 - 10.0 /100



                                                                 WBCs. The refer

ence



                                                                 range was not u

sed



                                                                 to interpret th

is



                                                                 result as



                                                                 normal/abnormal

.

 

             NRBC x10^3 (test code <0.01        See_Comment                [Auto

mated



             = 7144570356)                                        message] The s

ystem



                                                                 which generated



                                                                 this result



                                                                 transmitted



                                                                 reference range

:



                                                                 10*3/?L. The



                                                                 reference range

 was



                                                                 not used to



                                                                 interpret this



                                                                 result as



                                                                 normal/abnormal

.

 

             GRAN MAT (NEUT) % 71.2 %                                 



             (test code = 770-8)                                        

 

             IMM GRAN % (test code 1.30 %                                 



             = 0018512313)                                        

 

             LYMPH % (test code = 18.2 %                                 



             736-9)                                              

 

             MONO % (test code = 7.1 %                                  



             5905-5)                                             

 

             EOS % (test code = 1.9 %                                  



             713-8)                                              

 

             BASO % (test code = 0.3 %                                  



             706-2)                                              

 

             GRAN MAT x10^3(ANC) 8.37 10*3/uL 1.99-6.95    H            



             (test code =                                        



             9368743112)                                         

 

             IMM GRAN x10^3 (test 0.15 10*3/uL 0.00-0.06    H            



             code = 6461078782)                                        

 

             LYMPH x10^3 (test code 2.14 10*3/uL 1.09-3.23                 



             = 731-0)                                            

 

             MONO x10^3 (test code 0.84 10*3/uL 0.36-1.02                 



             = 742-7)                                            

 

             EOS x10^3 (test code = 0.22 10*3/uL 0.06-0.53                 



             711-2)                                              

 

             BASO x10^3 (test code 0.04 10*3/uL 0.01-0.09                 



             = 704-7)                                            

 

             Lab Interpretation Abnormal                               



             (test code = 51233-6)                                        



[facility]. METABOLIC PANEL (99929)2022 
11:03:21





             Test Item    Value        Reference Range Interpretation Comments

 

             NA (test code = 133 mmol/L   135-145      L            



             5366265489)                                         

 

             K (test code = 4.3 mmol/L   3.5-5.0                   



             8156634116)                                         

 

             CL (test code = 99 mmol/L                        



             4780429654)                                         

 

             CO2 TOTAL (test code = 20 mmol/L    23-31        L            



             4552450707)                                         

 

             AGAP (test code =              2-16                      



             8551518610)                                         

 

             BUN (test code = 11 mg/dL     7-23                      



             0927181006)                                         

 

             GLUCOSE (test code = 357 mg/dL           H            



             7432062821)                                         

 

             CREATININE (test code = 0.52 mg/dL   0.60-1.25    L            



             3405320879)                                         

 

             TOTAL BILI (test code = 0.7 mg/dL    0.1-1.1                   



             4825672769)                                         

 

             CALCIUM (test code = 9.6 mg/dL    8.6-10.6                  



             8985177338)                                         

 

             T PROTEIN (test code = 7.5 g/dL     6.3-8.2                   



             6459988041)                                         

 

             ALBUMIN (test code = 4.2 g/dL     3.5-5.0                   



             6101213608)                                         

 

             ALK PHOS (test code = 185 U/L             H            



             1143983347)                                         

 

             ALTv (test code = 78 U/L       5-50         H            



             1742-6)                                             

 

             AST(SGOT) (test code = 18 U/L       13-40                     



             9477819768)                                         

 

             eGFR (test code =              mL/min/1.73m2              



             3393500143)                                         

 

             MAL (test code = MAL) Association of                           



                          Glomerular Filtration                           



                          Rate (GFR) and Staging                           



                          of Kidney Disease*                           



                          +---------------------                           



                          --+-------------------                           



                          --+-------------------                           



                          ------+| GFR                           



                          (mL/min/1.73 m2) ?|                           



                          With Kidney Damage ?|                           



                          ?Without Kidney                           



                          Damage+---------------                           



                          --------+-------------                           



                          --------+-------------                           



                          ------------+| ?>90 ?                           



                          ? ? ? ? ? ? ? ?|                           



                          ?Stage one ? ? ? ? ?|                           



                          ? Normal ? ? ? ? ? ? ?                           



                          ?+--------------------                           



                          ---+------------------                           



                          ---+------------------                           



                          -------+| ?60-89 ? ? ?                           



                          ? ? ? ? ?| ?Stage two                           



                          ? ? ? ? ?| ? Decreased                           



                          GFR ? ? ? ?                            



                          +---------------------                           



                          --+-------------------                           



                          --+-------------------                           



                          ------+| ?30-59 ? ? ?                           



                          ? ? ? ? ?| ?Stage                           



                          three ? ? ? ?| ? Stage                           



                          three ? ? ? ? ?                           



                          +---------------------                           



                          --+-------------------                           



                          --+-------------------                           



                          ------+| ?15-29 ? ? ?                           



                          ? ? ? ? ?| ?Stage four                           



                          ? ? ? ? | ? Stage four                           



                          ? ? ? ? ?                              



                          ?+--------------------                           



                          ---+------------------                           



                          ---+------------------                           



                          -------+| ?<15 (or                           



                          dialysis) ? ?| ?Stage                           



                          five ? ? ? ? | ? Stage                           



                          five ? ? ? ? ?                           



                          ?+--------------------                           



                          ---+------------------                           



                          ---+------------------                           



                          -------+ *Each stage                           



                          assumes the associated                           



                          GFR level has been in                           



                          effect for at least                           



                          three months. ?Stages                           



                          1 to 5, with or                           



                          without kidney                           



                          disease, indicate                           



                          chronic kidney                           



                          disease. Notes:                           



                          Determination of                           



                          stages one and two                           



                          (with eGFR                             



                          >59mL/min/1.73 m2)                           



                          requires estimation of                           



                          kidney damage for at                           



                          least three months as                           



                          defined by structural                           



                          or functional                           



                          abnormalities of the                           



                          kidney, manifested by                           



                          either:Pathological                           



                          abnormalities or                           



                          Markers of kidney                           



                          damage (including                           



                          abnormalities in the                           



                          composition of the                           



                          blood or urine or                           



                          abnormalities in                           



                          imaging tests).                           

 

             Lab Interpretation Abnormal                               



             (test code = 89455-5)                                        



[facility]





             Test Item    Value        Reference Range Interpretation Comments

 

             LIPASE (test code = 0324222560) 63 U/L       0-220                 

    

 

             Lab Interpretation (test code = Normal                             

    



             65766-0)                                            



[facility] GLUCOSE (AUTOMATED)2022 22:54:04





             Test Item    Value        Reference Range Interpretation Comments

 

             POCT GLU (test code = 2952303608) 361 mg/dL           H      

      

 

             Lab Interpretation (test code = Abnormal                           

    



             16396-0)                                            



[facility] GLUCOSE (AUTOMATED)2022 12:19:07





             Test Item    Value        Reference Range Interpretation Comments

 

             POCT GLU (test code = 7088462482) 390 mg/dL           H      

      

 

             Lab Interpretation (test code = Abnormal                           

    



             97403-1)                                            



[facility] GLUCOSE (AUTOMATED)2022 04:48:35





             Test Item    Value        Reference Range Interpretation Comments

 

             POCT GLU (test code = 0961329736) 412 mg/dL           H      

      

 

             Lab Interpretation (test code = Abnormal                           

    



             51993-6)                                            



[facility] GLUCOSE (AUTOMATED)2022 01:44:33





             Test Item    Value        Reference Range Interpretation Comments

 

             POCT GLU (test code = 5428833497) 376 mg/dL           H      

      

 

             Lab Interpretation (test code = Abnormal                           

    



             59435-2)                                            



[facility] GLUCOSE (AUTOMATED)2022 21:51:37





             Test Item    Value        Reference Range Interpretation Comments

 

             POCT GLU (test code = 9109085515) 267 mg/dL           H      

      

 

             Lab Interpretation (test code = Abnormal                           

    



             53692-7)                                            



[facility] GLUCOSE (AUTOMATED)2022 17:05:21





             Test Item    Value        Reference Range Interpretation Comments

 

             POCT GLU (test code = 2694944364) 377 mg/dL           H      

      

 

             Lab Interpretation (test code = Abnormal                           

    



             13856-4)                                            



[facility] GLUCOSE (AUTOMATED)2022 13:10:54





             Test Item    Value        Reference Range Interpretation Comments

 

             POCT GLU (test code = 7011778179) 495 mg/dL           HH     

      

 

             Lab Interpretation (test code = Abnormal                           

    



             64847-6)                                            



[facility] GLUCOSE (AUTOMATED)2022 08:34:04





             Test Item    Value        Reference Range Interpretation Comments

 

             POCT GLU (test code = 1717217380) 427 mg/dL           H      

      

 

             Lab Interpretation (test code = Abnormal                           

    



             35415-1)                                            



[facility] GLUCOSE (AUTOMATED)2022 05:46:02





             Test Item    Value        Reference Range Interpretation Comments

 

             POCT GLU (test code = 5326400633) 451 mg/dL           HH     

      

 

             Lab Interpretation (test code = Abnormal                           

    



             49832-6)                                            



[facility] GLUCOSE (AUTOMATED)2022 02:44:38





             Test Item    Value        Reference Range Interpretation Comments

 

             POCT GLU (test code = 7316002291) 429 mg/dL           H      

      

 

             Lab Interpretation (test code = Abnormal                           

    



             22991-3)                                            



[facility] GLUCOSE (AUTOMATED)2022 22:38:24





             Test Item    Value        Reference Range Interpretation Comments

 

             POCT GLU (test code = 3463538851) 404 mg/dL           H      

      

 

             Lab Interpretation (test code = Abnormal                           

    



             17071-3)                                            



[facility] GLUCOSE (AUTOMATED)2022 18:05:20





             Test Item    Value        Reference Range Interpretation Comments

 

             POCT GLU (test code = 2169004775) 423 mg/dL           H      

      

 

             Lab Interpretation (test code = Abnormal                           

    



             07366-4)                                            



[facility] METABOLIC PANEL (NA, K, CL, CO2, 
GLUCOSE, BUN, CREATININE, CA)2022 14:56:25





             Test Item    Value        Reference Range Interpretation Comments

 

             NA (test code = 135 mmol/L   135-145                   



             6473193333)                                         

 

             K (test code = 4.0 mmol/L   3.5-5.0                   



             4061454765)                                         

 

             CL (test code = 102 mmol/L                       



             5581127533)                                         

 

             CO2 TOTAL (test code = 22 mmol/L    23-31        L            



             5620481134)                                         

 

             AGAP (test code =              2-16                      



             2452023183)                                         

 

             BUN (test code = 13 mg/dL     7-23                      



             6962586050)                                         

 

             GLUCOSE (test code = 547 mg/dL           HH           



             6832146494)                                         

 

             CREATININE (test code = 0.65 mg/dL   0.60-1.25                 



             7500799480)                                         

 

             CALCIUM (test code = 8.6 mg/dL    8.6-10.6                  



             8001701225)                                         

 

             eGFR (test code =              mL/min/1.73m2              



             1940289083)                                         

 

             MAL (test code = MAL) Association of                           



                          Glomerular Filtration                           



                          Rate (GFR) and Staging                           



                          of Kidney Disease*                           



                          +---------------------                           



                          --+-------------------                           



                          --+-------------------                           



                          ------+| GFR                           



                          (mL/min/1.73 m2) ?|                           



                          With Kidney Damage ?|                           



                          ?Without Kidney                           



                          Damage+---------------                           



                          --------+-------------                           



                          --------+-------------                           



                          ------------+| ?>90 ?                           



                          ? ? ? ? ? ? ? ?|                           



                          ?Stage one ? ? ? ? ?|                           



                          ? Normal ? ? ? ? ? ? ?                           



                          ?+--------------------                           



                          ---+------------------                           



                          ---+------------------                           



                          -------+| ?60-89 ? ? ?                           



                          ? ? ? ? ?| ?Stage two                           



                          ? ? ? ? ?| ? Decreased                           



                          GFR ? ? ? ?                            



                          +---------------------                           



                          --+-------------------                           



                          --+-------------------                           



                          ------+| ?30-59 ? ? ?                           



                          ? ? ? ? ?| ?Stage                           



                          three ? ? ? ?| ? Stage                           



                          three ? ? ? ? ?                           



                          +---------------------                           



                          --+-------------------                           



                          --+-------------------                           



                          ------+| ?15-29 ? ? ?                           



                          ? ? ? ? ?| ?Stage four                           



                          ? ? ? ? | ? Stage four                           



                          ? ? ? ? ?                              



                          ?+--------------------                           



                          ---+------------------                           



                          ---+------------------                           



                          -------+| ?<15 (or                           



                          dialysis) ? ?| ?Stage                           



                          five ? ? ? ? | ? Stage                           



                          five ? ? ? ? ?                           



                          ?+--------------------                           



                          ---+------------------                           



                          ---+------------------                           



                          -------+ *Each stage                           



                          assumes the associated                           



                          GFR level has been in                           



                          effect for at least                           



                          three months. ?Stages                           



                          1 to 5, with or                           



                          without kidney                           



                          disease, indicate                           



                          chronic kidney                           



                          disease. Notes:                           



                          Determination of                           



                          stages one and two                           



                          (with eGFR                             



                          >59mL/min/1.73 m2)                           



                          requires estimation of                           



                          kidney damage for at                           



                          least three months as                           



                          defined by structural                           



                          or functional                           



                          abnormalities of the                           



                          kidney, manifested by                           



                          either:Pathological                           



                          abnormalities or                           



                          Markers of kidney                           



                          damage (including                           



                          abnormalities in the                           



                          composition of the                           



                          blood or urine or                           



                          abnormalities in                           



                          imaging tests).                           

 

             Lab Interpretation Abnormal                               



             (test code = 62283-1)                                        



[facility] GLUCOSE (AUTOMATED)2022 13:36:27





             Test Item    Value        Reference Range Interpretation Comments

 

             POCT GLU (test code = 6108774708) 526 mg/dL           HH     

      

 

             Lab Interpretation (test code = Abnormal                           

    



             07711-1)                                            



[facility] GLUCOSE (AUTOMATED)2022 12:52:44





             Test Item    Value        Reference Range Interpretation Comments

 

             POCT GLU (test code = 9078209995) >600                HH     

      

 

             Lab Interpretation (test code = Abnormal                           

    



             90551-2)                                            



[facility] GLUCOSE (AUTOMATED)2022 12:52:44





             Test Item    Value        Reference Range Interpretation Comments

 

             POCT GLU (test code = 6223935533) >600                HH     

      

 

             Lab Interpretation (test code = Abnormal                           

    



             37609-8)                                            



[facility] GLUCOSE (AUTOMATED)2022 11:02:02





             Test Item    Value        Reference Range Interpretation Comments

 

             POCT GLU (test code = 9712599414) 521 mg/dL           HH     

      

 

             Lab Interpretation (test code = Abnormal                           

    



             90043-7)                                            



[facility]





             Test Item    Value        Reference Range Interpretation Comments

 

             POCT GLU (test code = 3564962891) 534 mg/dL           HH     

      

 

             Lab Interpretation (test code = Abnormal                           

    



             46630-5)                                            



[facility] METABOLIC PANEL (NA, K, CL, CO2, 
GLUCOSE, BUN, CREATININE, CA)2022 06:34:51





             Test Item    Value        Reference Range Interpretation Comments

 

             NA (test code = 133 mmol/L   135-145      L            



             6042436742)                                         

 

             K (test code = 4.9 mmol/L   3.5-5.0                   Slight



             9790366252)                                         hemolysis

 

             CL (test code = 96 mmol/L           L            



             6691154248)                                         

 

             CO2 TOTAL (test code 21 mmol/L    23-31        L            



             = 3608455337)                                        

 

             AGAP (test code =              2-16                      



             1217547316)                                         

 

             BUN (test code = 12 mg/dL     7-23                      Slight



             0331133855)                                         hemolysis

 

             GLUCOSE (test code = 639 mg/dL           HH           



             6374546668)                                         

 

             CREATININE (test code 0.46 mg/dL   0.60-1.25    L            



             = 3612955310)                                        

 

             CALCIUM (test code = 9.7 mg/dL    8.6-10.6                  



             8789434263)                                         

 

             eGFR (test code =              mL/min/1.73m2              



             3743872938)                                         

 

             MAL (test code = MAL) Association of                           



                          Glomerular                             



                          Filtration Rate                           



                          (GFR) and Staging                           



                          of Kidney Disease*                           



                          +------------------                           



                          -----+-------------                           



                          --------+----------                           



                          ---------------+|                           



                          GFR (mL/min/1.73                           



                          m2) ?| With Kidney                           



                          Damage ?| ?Without                           



                          Kidney                                 



                          Damage+------------                           



                          -----------+-------                           



                          --------------+----                           



                          -------------------                           



                          --+| ?>90 ? ? ? ? ?                           



                          ? ? ? ?| ?Stage one                           



                          ? ? ? ? ?| ? Normal                           



                          ? ? ? ? ? ? ?                           



                          ?+-----------------                           



                          ------+------------                           



                          ---------+---------                           



                          ----------------+|                           



                          ?60-89 ? ? ? ? ? ?                           



                          ? ?| ?Stage two ? ?                           



                          ? ? ?| ? Decreased                           



                          GFR ? ? ? ?                            



                          +------------------                           



                          -----+-------------                           



                          --------+----------                           



                          ---------------+|                           



                          ?30-59 ? ? ? ? ? ?                           



                          ? ?| ?Stage three ?                           



                          ? ? ?| ? Stage                           



                          three ? ? ? ? ?                           



                          +------------------                           



                          -----+-------------                           



                          --------+----------                           



                          ---------------+|                           



                          ?15-29 ? ? ? ? ? ?                           



                          ? ?| ?Stage four ?                           



                          ? ? ? | ? Stage                           



                          four ? ? ? ? ?                           



                          ?+-----------------                           



                          ------+------------                           



                          ---------+---------                           



                          ----------------+|                           



                          ?<15 (or dialysis)                           



                          ? ?| ?Stage five ?                           



                          ? ? ? | ? Stage                           



                          five ? ? ? ? ?                           



                          ?+-----------------                           



                          ------+------------                           



                          ---------+---------                           



                          ----------------+                           



                          *Each stage assumes                           



                          the associated GFR                           



                          level has been in                           



                          effect for at least                           



                          three months.                           



                          ?Stages 1 to 5,                           



                          with or without                           



                          kidney disease,                           



                          indicate chronic                           



                          kidney disease.                           



                          Notes:                                 



                          Determination of                           



                          stages one and two                           



                          (with eGFR                             



                          >59mL/min/1.73 m2)                           



                          requires estimation                           



                          of kidney damage                           



                          for at least three                           



                          months as defined                           



                          by structural or                           



                          functional                             



                          abnormalities of                           



                          the kidney,                            



                          manifested by                           



                          either:Pathological                           



                          abnormalities or                           



                          Markers of kidney                           



                          damage (including                           



                          abnormalities in                           



                          the composition of                           



                          the blood or urine                           



                          or abnormalities in                           



                          imaging tests).                           

 

             Lab Interpretation Abnormal                               



             (test code = 89089-2)                                        



[facility] METABOLIC PANEL ([id number])2022 
02:59:57





             Test Item    Value        Reference Range Interpretation Comments

 

             NA (test code = 126 mmol/L   135-145      L            



             2143695552)                                         

 

             K (test code = 5.2 mmol/L   3.5-5.0      H            



             9538635058)                                         

 

             CL (test code = 89 mmol/L           L            



             9996121560)                                         

 

             CO2 TOTAL (test code = 20 mmol/L    23-31        L            



             6674507532)                                         

 

             AGAP (test code =              2-16         H            



             4480011524)                                         

 

             BUN (test code = 12 mg/dL     7-23                      



             2053899259)                                         

 

             GLUCOSE (test code = 856 mg/dL           HH           



             0166693099)                                         

 

             CREATININE (test code = 0.49 mg/dL   0.60-1.25    L            



             9894446149)                                         

 

             TOTAL BILI (test code = 0.7 mg/dL    0.1-1.1                   



             0816375248)                                         

 

             CALCIUM (test code = 10.2 mg/dL   8.6-10.6                  



             9673246991)                                         

 

             T PROTEIN (test code = 8.2 g/dL     6.3-8.2                   



             0252671700)                                         

 

             ALBUMIN (test code = 4.5 g/dL     3.5-5.0                   



             3946538998)                                         

 

             ALK PHOS (test code = 186 U/L             H            



             6032012013)                                         

 

             ALTv (test code = 27 U/L       5-50                      



             1742-6)                                             

 

             AST(SGOT) (test code = 18 U/L       13-40                     



             2979943939)                                         

 

             eGFR (test code =              mL/min/1.73m2              



             1718197077)                                         

 

             MAL (test code = MAL) Association of                           



                          Glomerular Filtration                           



                          Rate (GFR) and Staging                           



                          of Kidney Disease*                           



                          +---------------------                           



                          --+-------------------                           



                          --+-------------------                           



                          ------+| GFR                           



                          (mL/min/1.73 m2) ?|                           



                          With Kidney Damage ?|                           



                          ?Without Kidney                           



                          Damage+---------------                           



                          --------+-------------                           



                          --------+-------------                           



                          ------------+| ?>90 ?                           



                          ? ? ? ? ? ? ? ?|                           



                          ?Stage one ? ? ? ? ?|                           



                          ? Normal ? ? ? ? ? ? ?                           



                          ?+--------------------                           



                          ---+------------------                           



                          ---+------------------                           



                          -------+| ?60-89 ? ? ?                           



                          ? ? ? ? ?| ?Stage two                           



                          ? ? ? ? ?| ? Decreased                           



                          GFR ? ? ? ?                            



                          +---------------------                           



                          --+-------------------                           



                          --+-------------------                           



                          ------+| ?30-59 ? ? ?                           



                          ? ? ? ? ?| ?Stage                           



                          three ? ? ? ?| ? Stage                           



                          three ? ? ? ? ?                           



                          +---------------------                           



                          --+-------------------                           



                          --+-------------------                           



                          ------+| ?15-29 ? ? ?                           



                          ? ? ? ? ?| ?Stage four                           



                          ? ? ? ? | ? Stage four                           



                          ? ? ? ? ?                              



                          ?+--------------------                           



                          ---+------------------                           



                          ---+------------------                           



                          -------+| ?<15 (or                           



                          dialysis) ? ?| ?Stage                           



                          five ? ? ? ? | ? Stage                           



                          five ? ? ? ? ?                           



                          ?+--------------------                           



                          ---+------------------                           



                          ---+------------------                           



                          -------+ *Each stage                           



                          assumes the associated                           



                          GFR level has been in                           



                          effect for at least                           



                          three months. ?Stages                           



                          1 to 5, with or                           



                          without kidney                           



                          disease, indicate                           



                          chronic kidney                           



                          disease. Notes:                           



                          Determination of                           



                          stages one and two                           



                          (with eGFR                             



                          >59mL/min/1.73 m2)                           



                          requires estimation of                           



                          kidney damage for at                           



                          least three months as                           



                          defined by structural                           



                          or functional                           



                          abnormalities of the                           



                          kidney, manifested by                           



                          either:Pathological                           



                          abnormalities or                           



                          Markers of kidney                           



                          damage (including                           



                          abnormalities in the                           



                          composition of the                           



                          blood or urine or                           



                          abnormalities in                           



                          imaging tests).                           

 

             Lab Interpretation Abnormal                               



             (test code = 82115-2)                                        



[facility]





             Test Item    Value        Reference Range Interpretation Comments

 

             LIPASE (test code = 0409041986) 146 U/L      0-220                 

    

 

             Lab Interpretation (test code = Normal                             

    



             92852-9)                                            



[id number] 02:34:07





             Test Item    Value        Reference Range Interpretation Comments

 

             WBC (test code =              See_Comment                [Automated



             3458-2)                                             message] The sy

stem



                                                                 which generated



                                                                 this result



                                                                 transmitted



                                                                 reference range

:



                                                                 4.20 - 10.70



                                                                 10*3/?L. The



                                                                 reference range

 was



                                                                 not used to



                                                                 interpret this



                                                                 result as



                                                                 normal/abnormal

.

 

             RBC (test code =              See_Comment                [Automated



             180-5)                                              message] The sy

stem



                                                                 which generated



                                                                 this result



                                                                 transmitted



                                                                 reference range

:



                                                                 4.26 - 5.52



                                                                 10*6/?L. The



                                                                 reference range

 was



                                                                 not used to



                                                                 interpret this



                                                                 result as



                                                                 normal/abnormal

.

 

             HGB (test code = 16.1 g/dL    12.2-16.4                 



             718-7)                                              

 

             HCT (test code = 47.5 %       38.4-49.3                 



             4544-3)                                             

 

             MCV (test code = 86.2 fL      81.7-95.6                 



             787-2)                                              

 

             MCH (test code = 29.2 pg      26.1-32.7                 



             785-6)                                              

 

             MCHC (test code = 33.9 g/dL    31.2-35.0                 



             786-4)                                              

 

             RDW-SD (test code = 42.0 fL      38.5-51.6                 



             65921-8)                                            

 

             RDW-CV (test code = 13.5 %       12.1-15.4                 



             788-0)                                              

 

             PLT (test code =              See_Comment  H             [Automated



             777-3)                                              message] The sy

stem



                                                                 which generated



                                                                 this result



                                                                 transmitted



                                                                 reference range

:



                                                                 150 - 328 10*3/

?L.



                                                                 The reference r

zhen



                                                                 was not used to



                                                                 interpret this



                                                                 result as



                                                                 normal/abnormal

.

 

             MPV (test code = 10.5 fL      9.8-13.0                  



             29567-6)                                            

 

             NRBC/100 WBC (test              See_Comment                [Automat

ed



             code = 4685546270)                                        message] 

The system



                                                                 which generated



                                                                 this result



                                                                 transmitted



                                                                 reference range

:



                                                                 0.0 - 10.0 /100



                                                                 WBCs. The refer

ence



                                                                 range was not u

sed



                                                                 to interpret th

is



                                                                 result as



                                                                 normal/abnormal

.

 

             NRBC x10^3 (test code <0.01        See_Comment                [Auto

mated



             = [id number])                                        message] The s

ystem



                                                                 which generated



                                                                 this result



                                                                 transmitted



                                                                 reference range

:



                                                                 10*3/?L. The



                                                                 reference range

 was



                                                                 not used to



                                                                 interpret this



                                                                 result as



                                                                 normal/abnormal

.

 

             GRAN MAT (NEUT) % 67.5 %                                 



             (test code = 770-8)                                        

 

             IMM GRAN % (test code 0.70 %                                 



             = 7475068378)                                        

 

             LYMPH % (test code = 21.7 %                                 



             736-9)                                              

 

             MONO % (test code = 8.4 %                                  



             5905-5)                                             

 

             EOS % (test code = 1.3 %                                  



             713-8)                                              

 

             BASO % (test code = 0.4 %                                  



             706-2)                                              

 

             GRAN MAT x10^3(ANC) 6.61 10*3/uL 1.99-6.95                 



             (test code =                                        



             8211838312)                                         

 

             IMM GRAN x10^3 (test 0.07 10*3/uL 0.00-0.06    H            



             code = 0966874372)                                        

 

             LYMPH x10^3 (test code 2.12 10*3/uL 1.09-3.23                 



             = 731-0)                                            

 

             MONO x10^3 (test code 0.82 10*3/uL 0.36-1.02                 



             = 742-7)                                            

 

             EOS x10^3 (test code = 0.13 10*3/uL 0.06-0.53                 



             711-2)                                              

 

             BASO x10^3 (test code 0.04 10*3/uL 0.01-0.09                 



             = 704-7)                                            

 

             Lab Interpretation Abnormal                               



             (test code = 88164-0)                                        



[facility] (ALB,T.PRO,BILI
T,BU/BC,ALT,AST,ALK PHOS)2022 13:57:09





             Test Item    Value        Reference Range Interpretation Comments

 

             TOTAL BILI (test code = 7911050606) 0.7 mg/dL    0.1-1.1           

        

 

             BILI UNCON (test code = 8576545859) 0.3 mg/dL    0.1-1.1           

        

 

             BILI CONJ (test code = 5164311634) 0.0 mg/dL    0.0-0.3            

       

 

             T PROTEIN (test code = 6804966544) 7.5 g/dL     6.3-8.2            

       

 

             ALBUMIN (test code = 8708104700) 3.8 g/dL     3.5-5.0              

     

 

             ALK PHOS (test code = 5208188860) 161 U/L             H      

      

 

             ALTv (test code = 1742-6) 44 U/L       5-50                      

 

             AST(SGOT) (test code = 7134923461) 47 U/L       13-40        H     

       

 

             Lab Interpretation (test code = Abnormal                           

    



             24118-2)                                            



[facility] (NA, K, CL, CO2, 
GLUCOSE, BUN, CREATININE, CA)2022 10:48:59





             Test Item    Value        Reference Range Interpretation Comments

 

             NA (test code = 136 mmol/L   135-145                   



             7146779957)                                         

 

             K (test code = 3.8 mmol/L   3.5-5.0                   



             9287443108)                                         

 

             CL (test code = 101 mmol/L                       



             2666601339)                                         

 

             CO2 TOTAL (test code = 22 mmol/L    23-31        L            



             2027015635)                                         

 

             AGAP (test code =              2-16                      



             5810136753)                                         

 

             BUN (test code = 16 mg/dL     7-23                      



             2314428885)                                         

 

             GLUCOSE (test code = 358 mg/dL           H            



             9238269436)                                         

 

             CREATININE (test code = 0.63 mg/dL   0.60-1.25                 



             2773595230)                                         

 

             CALCIUM (test code = 9.2 mg/dL    8.6-10.6                  



             2762165903)                                         

 

             eGFR (test code =              mL/min/1.73m2              



             1086730062)                                         

 

             MAL (test code = MAL) Association of                           



                          Glomerular Filtration                           



                          Rate (GFR) and Staging                           



                          of Kidney Disease*                           



                          +---------------------                           



                          --+-------------------                           



                          --+-------------------                           



                          ------+| GFR                           



                          (mL/min/1.73 m2) ?|                           



                          With Kidney Damage ?|                           



                          ?Without Kidney                           



                          Damage+---------------                           



                          --------+-------------                           



                          --------+-------------                           



                          ------------+| ?>90 ?                           



                          ? ? ? ? ? ? ? ?|                           



                          ?Stage one ? ? ? ? ?|                           



                          ? Normal ? ? ? ? ? ? ?                           



                          ?+--------------------                           



                          ---+------------------                           



                          ---+------------------                           



                          -------+| ?60-89 ? ? ?                           



                          ? ? ? ? ?| ?Stage two                           



                          ? ? ? ? ?| ? Decreased                           



                          GFR ? ? ? ?                            



                          +---------------------                           



                          --+-------------------                           



                          --+-------------------                           



                          ------+| ?30-59 ? ? ?                           



                          ? ? ? ? ?| ?Stage                           



                          three ? ? ? ?| ? Stage                           



                          three ? ? ? ? ?                           



                          +---------------------                           



                          --+-------------------                           



                          --+-------------------                           



                          ------+| ?15-29 ? ? ?                           



                          ? ? ? ? ?| ?Stage four                           



                          ? ? ? ? | ? Stage four                           



                          ? ? ? ? ?                              



                          ?+--------------------                           



                          ---+------------------                           



                          ---+------------------                           



                          -------+| ?<15 (or                           



                          dialysis) ? ?| ?Stage                           



                          five ? ? ? ? | ? Stage                           



                          five ? ? ? ? ?                           



                          ?+--------------------                           



                          ---+------------------                           



                          ---+------------------                           



                          -------+ *Each stage                           



                          assumes the associated                           



                          GFR level has been in                           



                          effect for at least                           



                          three months. ?Stages                           



                          1 to 5, with or                           



                          without kidney                           



                          disease, indicate                           



                          chronic kidney                           



                          disease. Notes:                           



                          Determination of                           



                          stages one and two                           



                          (with eGFR                             



                          >59mL/min/1.73 m2)                           



                          requires estimation of                           



                          kidney damage for at                           



                          least three months as                           



                          defined by structural                           



                          or functional                           



                          abnormalities of the                           



                          kidney, manifested by                           



                          either:Pathological                           



                          abnormalities or                           



                          Markers of kidney                           



                          damage (including                           



                          abnormalities in the                           



                          composition of the                           



                          blood or urine or                           



                          abnormalities in                           



                          imaging tests).                           

 

             Lab Interpretation Abnormal                               



             (test code = 96671-3)                                        



[facility] WITH [id number] 10:01:35





             Test Item    Value        Reference Range Interpretation Comments

 

             WBC (test code =              See_Comment                [Automated



             6690-2)                                             message] The sy

stem



                                                                 which generated



                                                                 this result



                                                                 transmitted



                                                                 reference range

:



                                                                 4.20 - 10.70



                                                                 10*3/?L. The



                                                                 reference range

 was



                                                                 not used to



                                                                 interpret this



                                                                 result as



                                                                 normal/abnormal

.

 

             RBC (test code =              See_Comment                [Automated



             789-8)                                              message] The sy

stem



                                                                 which generated



                                                                 this result



                                                                 transmitted



                                                                 reference range

:



                                                                 4.26 - 5.52



                                                                 10*6/?L. The



                                                                 reference range

 was



                                                                 not used to



                                                                 interpret this



                                                                 result as



                                                                 normal/abnormal

.

 

             HGB (test code = 14.9 g/dL    12.2-16.4                 



             718-7)                                              

 

             HCT (test code = 43.2 %       38.4-49.3                 



             4544-3)                                             

 

             MCV (test code = 86.1 fL      81.7-95.6                 



             787-2)                                              

 

             MCH (test code = 29.7 pg      26.1-32.7                 



             785-6)                                              

 

             MCHC (test code = 34.5 g/dL    31.2-35.0                 



             786-4)                                              

 

             RDW-SD (test code = 41.6 fL      38.5-51.6                 



             47372-2)                                            

 

             RDW-CV (test code = 13.4 %       12.1-15.4                 



             788-0)                                              

 

             PLT (test code =              See_Comment  H             [Automated



             777-3)                                              message] The sy

stem



                                                                 which generated



                                                                 this result



                                                                 transmitted



                                                                 reference range

:



                                                                 150 - 328 10*3/

?L.



                                                                 The reference r

zhen



                                                                 was not used to



                                                                 interpret this



                                                                 result as



                                                                 normal/abnormal

.

 

             MPV (test code = 11.0 fL      9.8-13.0                  



             45275-2)                                            

 

             NRBC/100 WBC (test              See_Comment                [Automat

ed



             code = 5006430374)                                        message] 

The system



                                                                 which generated



                                                                 this result



                                                                 transmitted



                                                                 reference range

:



                                                                 0.0 - 10.0 /100



                                                                 WBCs. The refer

ence



                                                                 range was not u

sed



                                                                 to interpret th

is



                                                                 result as



                                                                 normal/abnormal

.

 

             NRBC x10^3 (test code <0.01        See_Comment                [Auto

mated



             = [id number])                                        message] The s

ystem



                                                                 which generated



                                                                 this result



                                                                 transmitted



                                                                 reference range

:



                                                                 10*3/?L. The



                                                                 reference range

 was



                                                                 not used to



                                                                 interpret this



                                                                 result as



                                                                 normal/abnormal

.

 

             GRAN MAT (NEUT) % 62.6 %                                 



             (test code = 770-8)                                        

 

             IMM GRAN % (test code 0.40 %                                 



             = 3401362416)                                        

 

             LYMPH % (test code = 23.2 %                                 



             736-9)                                              

 

             MONO % (test code = 9.6 %                                  



             5905-5)                                             

 

             EOS % (test code = 3.8 %                                  



             713-8)                                              

 

             BASO % (test code = 0.4 %                                  



             706-2)                                              

 

             GRAN MAT x10^3(ANC) 4.76 10*3/uL 1.99-6.95                 



             (test code =                                        



             8760753938)                                         

 

             IMM GRAN x10^3 (test 0.03 10*3/uL 0.00-0.06                 



             code = 0682640369)                                        

 

             LYMPH x10^3 (test code 1.76 10*3/uL 1.09-3.23                 



             = 731-0)                                            

 

             MONO x10^3 (test code 0.73 10*3/uL 0.36-1.02                 



             = 742-7)                                            

 

             EOS x10^3 (test code = 0.29 10*3/uL 0.06-0.53                 



             711-2)                                              

 

             BASO x10^3 (test code 0.03 10*3/uL 0.01-0.09                 



             = 704-7)                                            

 

             Lab Interpretation Abnormal                               



             (test code = 04646-3)                                        



[facility]. METABOLIC PANEL ([id number])2022 
04:36:59





             Test Item    Value        Reference Range Interpretation Comments

 

             NA (test code = 138 mmol/L   135-145                   



             0889560936)                                         

 

             K (test code = 5.4 mmol/L   3.5-5.0      H            



             0917042033)                                         

 

             CL (test code = 98 mmol/L                        



             2721092659)                                         

 

             CO2 TOTAL (test code = 17 mmol/L    23-31        L            



             3806194666)                                         

 

             AGAP (test code =              2-16         H            



             1931659844)                                         

 

             BUN (test code = 19 mg/dL     7-23                      



             5022327048)                                         

 

             GLUCOSE (test code = 469 mg/dL           HH           



             4493443414)                                         

 

             CREATININE (test code = 0.74 mg/dL   0.60-1.25                 



             3978408783)                                         

 

             TOTAL BILI (test code = 1.0 mg/dL    0.1-1.1                   



             7949975283)                                         

 

             CALCIUM (test code = 10.1 mg/dL   8.6-10.6                  



             4796537445)                                         

 

             T PROTEIN (test code = 8.2 g/dL     6.3-8.2                   



             8611050842)                                         

 

             ALBUMIN (test code = 4.6 g/dL     3.5-5.0                   



             0772177159)                                         

 

             ALK PHOS (test code = 208 U/L             H            



             9068129555)                                         

 

             ALTv (test code = 51 U/L       5-50         H            



             2-6)                                             

 

             AST(SGOT) (test code = 18 U/L       13-40                     



             8595367302)                                         

 

             eGFR (test code =              mL/min/1.73m2              



             1620002317)                                         

 

             MAL (test code = MAL) Association of                           



                          Glomerular Filtration                           



                          Rate (GFR) and Staging                           



                          of Kidney Disease*                           



                          +---------------------                           



                          --+-------------------                           



                          --+-------------------                           



                          ------+| GFR                           



                          (mL/min/1.73 m2) ?|                           



                          With Kidney Damage ?|                           



                          ?Without Kidney                           



                          Damage+---------------                           



                          --------+-------------                           



                          --------+-------------                           



                          ------------+| ?>90 ?                           



                          ? ? ? ? ? ? ? ?|                           



                          ?Stage one ? ? ? ? ?|                           



                          ? Normal ? ? ? ? ? ? ?                           



                          ?+--------------------                           



                          ---+------------------                           



                          ---+------------------                           



                          -------+| ?60-89 ? ? ?                           



                          ? ? ? ? ?| ?Stage two                           



                          ? ? ? ? ?| ? Decreased                           



                          GFR ? ? ? ?                            



                          +---------------------                           



                          --+-------------------                           



                          --+-------------------                           



                          ------+| ?30-59 ? ? ?                           



                          ? ? ? ? ?| ?Stage                           



                          three ? ? ? ?| ? Stage                           



                          three ? ? ? ? ?                           



                          +---------------------                           



                          --+-------------------                           



                          --+-------------------                           



                          ------+| ?15-29 ? ? ?                           



                          ? ? ? ? ?| ?Stage four                           



                          ? ? ? ? | ? Stage four                           



                          ? ? ? ? ?                              



                          ?+--------------------                           



                          ---+------------------                           



                          ---+------------------                           



                          -------+| ?<15 (or                           



                          dialysis) ? ?| ?Stage                           



                          five ? ? ? ? | ? Stage                           



                          five ? ? ? ? ?                           



                          ?+--------------------                           



                          ---+------------------                           



                          ---+------------------                           



                          -------+ *Each stage                           



                          assumes the associated                           



                          GFR level has been in                           



                          effect for at least                           



                          three months. ?Stages                           



                          1 to 5, with or                           



                          without kidney                           



                          disease, indicate                           



                          chronic kidney                           



                          disease. Notes:                           



                          Determination of                           



                          stages one and two                           



                          (with eGFR                             



                          >59mL/min/1.73 m2)                           



                          requires estimation of                           



                          kidney damage for at                           



                          least three months as                           



                          defined by structural                           



                          or functional                           



                          abnormalities of the                           



                          kidney, manifested by                           



                          either:Pathological                           



                          abnormalities or                           



                          Markers of kidney                           



                          damage (including                           



                          abnormalities in the                           



                          composition of the                           



                          blood or urine or                           



                          abnormalities in                           



                          imaging tests).                           

 

             Lab Interpretation Abnormal                               



             (test code = 19723-7)                                        



[facility]





             Test Item    Value        Reference Range Interpretation Comments

 

             LIPASE (test code = 2949582658) 137 U/L      0-220                 

    

 

             Lab Interpretation (test code = Normal                             

    



             24091-5)                                            



[facility] WITH [id number] 04:07:59





             Test Item    Value        Reference Range Interpretation Comments

 

             WBC (test code =              See_Comment  H             [Automated



             6690-2)                                             message] The sy

stem



                                                                 which generated



                                                                 this result



                                                                 transmitted



                                                                 reference range

:



                                                                 4.20 - 10.70



                                                                 10*3/?L. The



                                                                 reference range

 was



                                                                 not used to



                                                                 interpret this



                                                                 result as



                                                                 normal/abnormal

.

 

             RBC (test code =              See_Comment  H             [Automated



             789-8)                                              message] The sy

stem



                                                                 which generated



                                                                 this result



                                                                 transmitted



                                                                 reference range

:



                                                                 4.26 - 5.52



                                                                 10*6/?L. The



                                                                 reference range

 was



                                                                 not used to



                                                                 interpret this



                                                                 result as



                                                                 normal/abnormal

.

 

             HGB (test code = 16.8 g/dL    12.2-16.4    H            



             718-7)                                              

 

             HCT (test code = 49.2 %       38.4-49.3                 



             4544-3)                                             

 

             MCV (test code = 86.2 fL      81.7-95.6                 



             787-2)                                              

 

             MCH (test code = 29.4 pg      26.1-32.7                 



             785-6)                                              

 

             MCHC (test code = 34.1 g/dL    31.2-35.0                 



             786-4)                                              

 

             RDW-SD (test code = 42.6 fL      38.5-51.6                 



             36522-6)                                            

 

             RDW-CV (test code = 13.6 %       12.1-15.4                 



             788-0)                                              

 

             PLT (test code =              See_Comment  H             [Automated



             777-3)                                              message] The sy

stem



                                                                 which generated



                                                                 this result



                                                                 transmitted



                                                                 reference range

:



                                                                 150 - 328 10*3/

?L.



                                                                 The reference r

zhen



                                                                 was not used to



                                                                 interpret this



                                                                 result as



                                                                 normal/abnormal

.

 

             MPV (test code = 10.7 fL      9.8-13.0                  



             63898-6)                                            

 

             NRBC/100 WBC (test              See_Comment                [Automat

ed



             code = 4378816782)                                        message] 

The system



                                                                 which generated



                                                                 this result



                                                                 transmitted



                                                                 reference range

:



                                                                 0.0 - 10.0 /100



                                                                 WBCs. The refer

ence



                                                                 range was not u

sed



                                                                 to interpret th

is



                                                                 result as



                                                                 normal/abnormal

.

 

             NRBC x10^3 (test code <0.01        See_Comment                [Auto

mated



             = 2312619478)                                        message] The s

ystem



                                                                 which generated



                                                                 this result



                                                                 transmitted



                                                                 reference range

:



                                                                 10*3/?L. The



                                                                 reference range

 was



                                                                 not used to



                                                                 interpret this



                                                                 result as



                                                                 normal/abnormal

.

 

             GRAN MAT (NEUT) % 74.2 %                                 



             (test code = 770-8)                                        

 

             IMM GRAN % (test code 0.50 %                                 



             = 1481257950)                                        

 

             LYMPH % (test code = 14.8 %                                 



             736-9)                                              

 

             MONO % (test code = 8.3 %                                  



             5905-5)                                             

 

             EOS % (test code = 1.9 %                                  



             713-8)                                              

 

             BASO % (test code = 0.3 %                                  



             706-2)                                              

 

             GRAN MAT x10^3(ANC) 8.01 10*3/uL 1.99-6.95    H            



             (test code =                                        



             2186552894)                                         

 

             IMM GRAN x10^3 (test 0.05 10*3/uL 0.00-0.06                 



             code = 6325499913)                                        

 

             LYMPH x10^3 (test code 1.59 10*3/uL 1.09-3.23                 



             = 731-0)                                            

 

             MONO x10^3 (test code 0.89 10*3/uL 0.36-1.02                 



             = 742-7)                                            

 

             EOS x10^3 (test code = 0.20 10*3/uL 0.06-0.53                 



             711-2)                                              

 

             BASO x10^3 (test code 0.03 10*3/uL 0.01-0.09                 



             = 704-7)                                            

 

             Lab Interpretation Abnormal                               



             (test code = 21395-0)                                        



[facility] GLUCOSE (AUTOMATED)2022-06-10 17:25:00





             Test Item    Value        Reference Range Interpretation Comments

 

             POCT GLU (test code = 8746792512) 249 mg/dL           H      

      

 

             Lab Interpretation (test code = Abnormal                           

    



             71578-1)                                            



[facility] GLUCOSE (AUTOMATED)2022-06-10 12:54:01





             Test Item    Value        Reference Range Interpretation Comments

 

             POCT GLU (test code = 7396152783) 188 mg/dL           H      

      

 

             Lab Interpretation (test code = Abnormal                           

    



             87482-4)                                            



[facility] METABOLIC PANEL (NA, K, CL, CO2, 
GLUCOSE, BUN, CREATININE, CA)2022-06-10 11:48:23





             Test Item    Value        Reference Range Interpretation Comments

 

             NA (test code = 137 mmol/L   135-145                   



             8843147498)                                         

 

             K (test code = 4.3 mmol/L   3.5-5.0                   



             7569116628)                                         

 

             CL (test code = 105 mmol/L                       



             8470954746)                                         

 

             CO2 TOTAL (test code = 23 mmol/L    23-31                     



             6454329934)                                         

 

             AGAP (test code =              2-16                      



             2089587927)                                         

 

             BUN (test code = 19 mg/dL     7-23                      



             8804906011)                                         

 

             GLUCOSE (test code = 243 mg/dL           H            



             3728431643)                                         

 

             CREATININE (test code = 0.50 mg/dL   0.60-1.25    L            



             4016163963)                                         

 

             CALCIUM (test code = 8.9 mg/dL    8.6-10.6                  



             2162500779)                                         

 

             eGFR (test code =              mL/min/1.73m2              



             6954849416)                                         

 

             MAL (test code = MAL) Association of                           



                          Glomerular Filtration                           



                          Rate (GFR) and Staging                           



                          of Kidney Disease*                           



                          +---------------------                           



                          --+-------------------                           



                          --+-------------------                           



                          ------+| GFR                           



                          (mL/min/1.73 m2) ?|                           



                          With Kidney Damage ?|                           



                          ?Without Kidney                           



                          Damage+---------------                           



                          --------+-------------                           



                          --------+-------------                           



                          ------------+| ?>90 ?                           



                          ? ? ? ? ? ? ? ?|                           



                          ?Stage one ? ? ? ? ?|                           



                          ? Normal ? ? ? ? ? ? ?                           



                          ?+--------------------                           



                          ---+------------------                           



                          ---+------------------                           



                          -------+| ?60-89 ? ? ?                           



                          ? ? ? ? ?| ?Stage two                           



                          ? ? ? ? ?| ? Decreased                           



                          GFR ? ? ? ?                            



                          +---------------------                           



                          --+-------------------                           



                          --+-------------------                           



                          ------+| ?30-59 ? ? ?                           



                          ? ? ? ? ?| ?Stage                           



                          three ? ? ? ?| ? Stage                           



                          three ? ? ? ? ?                           



                          +---------------------                           



                          --+-------------------                           



                          --+-------------------                           



                          ------+| ?15-29 ? ? ?                           



                          ? ? ? ? ?| ?Stage four                           



                          ? ? ? ? | ? Stage four                           



                          ? ? ? ? ?                              



                          ?+--------------------                           



                          ---+------------------                           



                          ---+------------------                           



                          -------+| ?<15 (or                           



                          dialysis) ? ?| ?Stage                           



                          five ? ? ? ? | ? Stage                           



                          five ? ? ? ? ?                           



                          ?+--------------------                           



                          ---+------------------                           



                          ---+------------------                           



                          -------+ *Each stage                           



                          assumes the associated                           



                          GFR level has been in                           



                          effect for at least                           



                          three months. ?Stages                           



                          1 to 5, with or                           



                          without kidney                           



                          disease, indicate                           



                          chronic kidney                           



                          disease. Notes:                           



                          Determination of                           



                          stages one and two                           



                          (with eGFR                             



                          >59mL/min/1.73 m2)                           



                          requires estimation of                           



                          kidney damage for at                           



                          least three months as                           



                          defined by structural                           



                          or functional                           



                          abnormalities of the                           



                          kidney, manifested by                           



                          either:Pathological                           



                          abnormalities or                           



                          Markers of kidney                           



                          damage (including                           



                          abnormalities in the                           



                          composition of the                           



                          blood or urine or                           



                          abnormalities in                           



                          imaging tests).                           

 

             Lab Interpretation Abnormal                               



             (test code = 76363-7)                                        



[facility]





             Test Item    Value        Reference Range Interpretation Comments

 

             MAGNESIUM (test code = 1805786132) 2.1 mg/dL    1.7-2.4            

       

 

             Lab Interpretation (test code = Normal                             

    



             16150-9)                                            



[facility] WITH DIFF2022-06-10 11:09:44





             Test Item    Value        Reference Range Interpretation Comments

 

             WBC (test code =              See_Comment                [Automated

 message]



             6690-2)                                             The system [employer]



                                                                 generated this 

result



                                                                 transmitted ref

erence



                                                                 range: 4.20 - 1

0.70



                                                                 10*3/?L. The re

ference



                                                                 range was not u

sed to



                                                                 interpret this 

result



                                                                 as normal/abnor

mal.

 

             RBC (test code =              See_Comment                [Automated

 message]



             789-8)                                              The system [employer]



                                                                 generated this 

result



                                                                 transmitted ref

erence



                                                                 range: 4.26 - 5

.52



                                                                 10*6/?L. The re

ference



                                                                 range was not u

sed to



                                                                 interpret this 

result



                                                                 as normal/abnor

mal.

 

             HGB (test code = 15.9 g/dL    12.2-16.4                 



             718-7)                                              

 

             HCT (test code = 46.8 %       38.4-49.3                 



             4544-3)                                             

 

             MCV (test code = 87.5 fL      81.7-95.6                 



             787-2)                                              

 

             MCH (test code = 29.7 pg      26.1-32.7                 



             785-6)                                              

 

             MCHC (test code = 34.0 g/dL    31.2-35.0                 



             786-4)                                              

 

             RDW-SD (test code 43.6 fL      38.5-51.6                 



             = 88569-4)                                          

 

             RDW-CV (test code 13.7 %       12.1-15.4                 



             = 788-0)                                            

 

             PLT (test code =              See_Comment                [Automated

 message]



             777-3)                                              The system [employer]



                                                                 generated this 

result



                                                                 transmitted ref

erence



                                                                 range: 150 - 32

8



                                                                 10*3/?L. The re

ference



                                                                 range was not u

sed to



                                                                 interpret this 

result



                                                                 as normal/abnor

mal.

 

             MPV (test code = 11.0 fL      9.8-13.0                  



             98693-8)                                            

 

             NRBC/100 WBC (test              See_Comment                [Automat

ed message]



             code = 1171716683)                                        The [employer]

[employer] which



                                                                 generated this 

result



                                                                 transmitted ref

erence



                                                                 range: 0.0 - 10

.0 /100



                                                                 WBCs. The refer

ence



                                                                 range was not u

sed to



                                                                 interpret this 

result



                                                                 as normal/abnor

mal.

 

             NRBC x10^3 (test <0.01        See_Comment                [Automated

 message]



             code = 4683562996)                                        The syste

m which



                                                                 generated this 

result



                                                                 transmitted ref

erence



                                                                 range: 10*3/?L.

 The



                                                                 reference range

 was not



                                                                 used to interpr

et this



                                                                 result as



                                                                 normal/abnormal

.

 

             GRAN MAT (NEUT) % 57.9 %                                 



             (test code =                                        



             770-8)                                              

 

             IMM GRAN % (test 0.60 %                                 



             code = 9701070045)                                        

 

             LYMPH % (test code 30.7 %                                 



             = 736-9)                                            

 

             MONO % (test code 8.5 %                                  



             = 5905-5)                                           

 

             EOS % (test code = 2.0 %                                  



             713-8)                                              

 

             BASO % (test code 0.3 %                                  



             = 706-2)                                            

 

             GRAN MAT     5.14 10*3/uL 1.99-6.95                 



             x10^3(ANC) (test                                        



             code = 9493092551)                                        

 

             IMM GRAN x10^3 0.05 10*3/uL 0.00-0.06                 



             (test code =                                        



             3238230017)                                         

 

             LYMPH x10^3 (test 2.73 10*3/uL 1.09-3.23                 



             code = 731-0)                                        

 

             MONO x10^3 (test 0.76 10*3/uL 0.36-1.02                 



             code = 742-7)                                        

 

             EOS x10^3 (test 0.18 10*3/uL 0.06-0.53                 



             code = 711-2)                                        

 

             BASO x10^3 (test 0.03 10*3/uL 0.01-0.09                 



             code = 704-7)                                        



[facility] GLUCOSE (AUTOMATED)2022-06-10 10:34:14





             Test Item    Value        Reference Range Interpretation Comments

 

             POCT GLU (test code = 4136525861) 230 mg/dL           H      

      

 

             Lab Interpretation (test code = Abnormal                           

    



             93383-2)                                            



[facility] GLUCOSE (AUTOMATED)2022-06-10 05:56:50





             Test Item    Value        Reference Range Interpretation Comments

 

             POCT GLU (test code = 8024250805) 314 mg/dL           H      

      

 

             Lab Interpretation (test code = Abnormal                           

    



             60093-7)                                            



[facility] GLUCOSE (AUTOMATED)2022-06-10 04:40:22





             Test Item    Value        Reference Range Interpretation Comments

 

             POCT GLU (test code = 5901161715) 324 mg/dL           H      

      

 

             Lab Interpretation (test code = Abnormal                           

    



             03043-5)                                            



[facility] GLUCOSE (AUTOMATED)2022-06-10 02:37:33





             Test Item    Value        Reference Range Interpretation Comments

 

             POCT GLU (test code = 2183956989) 296 mg/dL           H      

      

 

             Lab Interpretation (test code = Abnormal                           

    



             09068-5)                                            



[facility] GLUCOSE (AUTOMATED)2022-06-10 01:05:02





             Test Item    Value        Reference Range Interpretation Comments

 

             POCT GLU (test code = 4231393752) 319 mg/dL           H      

      

 

             Lab Interpretation (test code = Abnormal                           

    



             32456-4)                                            



[facility] GLUCOSE (AUTOMATED)2022 21:59:09





             Test Item    Value        Reference Range Interpretation Comments

 

             POCT GLU (test code = 1036174749) 257 mg/dL           H      

      

 

             Lab Interpretation (test code = Abnormal                           

    



             70976-9)                                            



[facility] GLUCOSE (AUTOMATED)2022 17:21:41





             Test Item    Value        Reference Range Interpretation Comments

 

             POCT GLU (test code = 1544001804) 214 mg/dL           H      

      

 

             Lab Interpretation (test code = Abnormal                           

    



             03650-8)                                            



[facility] GLUCOSE (AUTOMATED)2022 13:04:04





             Test Item    Value        Reference Range Interpretation Comments

 

             POCT GLU (test code = 0008739561) 234 mg/dL           H      

      

 

             Lab Interpretation (test code = Abnormal                           

    



             73170-2)                                            



[facility] METABOLIC PANEL (NA, K, CL, CO2, 
GLUCOSE, BUN, CREATININE, CA)2022 12:23:33





             Test Item    Value        Reference Range Interpretation Comments

 

             NA (test code = 136 mmol/L   135-145                   



             9817579710)                                         

 

             K (test code = 4.1 mmol/L   3.5-5.0                   



             2114657140)                                         

 

             CL (test code = 109 mmol/L          H            



             8823552706)                                         

 

             CO2 TOTAL (test code = 20 mmol/L    23-31        L            



             3553395707)                                         

 

             AGAP (test code =              2-16                      



             7897156418)                                         

 

             BUN (test code = 16 mg/dL     7-23                      



             1598705707)                                         

 

             GLUCOSE (test code = 281 mg/dL           H            



             3706712630)                                         

 

             CREATININE (test code = 0.50 mg/dL   0.60-1.25    L            



             8812499244)                                         

 

             CALCIUM (test code = 8.3 mg/dL    8.6-10.6     L            



             8707114038)                                         

 

             eGFR (test code =              mL/min/1.73m2              



             6827173033)                                         

 

             MAL (test code = MAL) Association of                           



                          Glomerular Filtration                           



                          Rate (GFR) and Staging                           



                          of Kidney Disease*                           



                          +---------------------                           



                          --+-------------------                           



                          --+-------------------                           



                          ------+| GFR                           



                          (mL/min/1.73 m2) ?|                           



                          With Kidney Damage ?|                           



                          ?Without Kidney                           



                          Damage+---------------                           



                          --------+-------------                           



                          --------+-------------                           



                          ------------+| ?>90 ?                           



                          ? ? ? ? ? ? ? ?|                           



                          ?Stage one ? ? ? ? ?|                           



                          ? Normal ? ? ? ? ? ? ?                           



                          ?+--------------------                           



                          ---+------------------                           



                          ---+------------------                           



                          -------+| ?60-89 ? ? ?                           



                          ? ? ? ? ?| ?Stage two                           



                          ? ? ? ? ?| ? Decreased                           



                          GFR ? ? ? ?                            



                          +---------------------                           



                          --+-------------------                           



                          --+-------------------                           



                          ------+| ?30-59 ? ? ?                           



                          ? ? ? ? ?| ?Stage                           



                          three ? ? ? ?| ? Stage                           



                          three ? ? ? ? ?                           



                          +---------------------                           



                          --+-------------------                           



                          --+-------------------                           



                          ------+| ?15-29 ? ? ?                           



                          ? ? ? ? ?| ?Stage four                           



                          ? ? ? ? | ? Stage four                           



                          ? ? ? ? ?                              



                          ?+--------------------                           



                          ---+------------------                           



                          ---+------------------                           



                          -------+| ?<15 (or                           



                          dialysis) ? ?| ?Stage                           



                          five ? ? ? ? | ? Stage                           



                          five ? ? ? ? ?                           



                          ?+--------------------                           



                          ---+------------------                           



                          ---+------------------                           



                          -------+ *Each stage                           



                          assumes the associated                           



                          GFR level has been in                           



                          effect for at least                           



                          three months. ?Stages                           



                          1 to 5, with or                           



                          without kidney                           



                          disease, indicate                           



                          chronic kidney                           



                          disease. Notes:                           



                          Determination of                           



                          stages one and two                           



                          (with eGFR                             



                          >59mL/min/1.73 m2)                           



                          requires estimation of                           



                          kidney damage for at                           



                          least three months as                           



                          defined by structural                           



                          or functional                           



                          abnormalities of the                           



                          kidney, manifested by                           



                          either:Pathological                           



                          abnormalities or                           



                          Markers of kidney                           



                          damage (including                           



                          abnormalities in the                           



                          composition of the                           



                          blood or urine or                           



                          abnormalities in                           



                          imaging tests).                           

 

             Lab Interpretation Abnormal                               



             (test code = 47075-1)                                        



[facility]





             Test Item    Value        Reference Range Interpretation Comments

 

             MAGNESIUM (test code = 9229073288) 1.6 mg/dL    1.7-2.4      L     

       

 

             Lab Interpretation (test code = Abnormal                           

    



             76077-3)                                            



[facility]13





             Test Item    Value        Reference Range Interpretation Comments

 

             PHOSPHORUS (test code = 2059401731) 3.2 mg/dL    2.5-5.0           

        

 

             Lab Interpretation (test code = Normal                             

    



             27051-3)                                            



[facility] 
10:04:37





             Test Item    Value        Reference Range Interpretation Comments

 

             HGB A1C (test code = 9.6 %        4.0-5.7      H            



             4548-4)                                             

 

             MAL (test code = MAL) Reference                              



                          RangesNormal:                           



                          <5.7%Prediabetes:                           



                          5.7 - 6.4%Diabetes:                           



                          > 6.5%                                 

 

             Lab Interpretation (test Abnormal                               



             code = 06472-3)                                        



[id number] 09:29:41





             Test Item    Value        Reference Range Interpretation Comments

 

             WBC (test code =              See_Comment                [Automated

 message]



             8090-2)                                             The system [employer]



                                                                 generated this 

result



                                                                 transmitted ref

erence



                                                                 range: 4.20 - 1

0.70



                                                                 10*3/?L. The re

ference



                                                                 range was not u

sed to



                                                                 interpret this 

result



                                                                 as normal/abnor

mal.

 

             RBC (test code =              See_Comment                [Automated

 message]



             339-8)                                              The system [employer]



                                                                 generated this 

result



                                                                 transmitted ref

erence



                                                                 range: 4.26 - 5

.52



                                                                 10*6/?L. The re

ference



                                                                 range was not u

sed to



                                                                 interpret this 

result



                                                                 as normal/abnor

mal.

 

             HGB (test code = 14.7 g/dL    12.2-16.4                 



             718-7)                                              

 

             HCT (test code = 43.3 %       38.4-49.3                 



             4544-3)                                             

 

             MCV (test code = 86.9 fL      81.7-95.6                 



             787-2)                                              

 

             MCH (test code = 29.5 pg      26.1-32.7                 



             785-6)                                              

 

             MCHC (test code = 33.9 g/dL    31.2-35.0                 



             786-4)                                              

 

             RDW-SD (test code 42.5 fL      38.5-51.6                 



             = 52412-6)                                          

 

             RDW-CV (test code 13.5 %       12.1-15.4                 



             = 788-0)                                            

 

             PLT (test code =              See_Comment                [Automated

 message]



             967-3)                                              The system [employer]



                                                                 generated this 

result



                                                                 transmitted ref

erence



                                                                 range: 150 - 32

8



                                                                 10*3/?L. The re

ference



                                                                 range was not u

sed to



                                                                 interpret this 

result



                                                                 as normal/abnor

mal.

 

             MPV (test code = 10.8 fL      9.8-13.0                  



             20809-9)                                            

 

             NRBC/100 WBC (test              See_Comment                [Automat

ed message]



             code = 1762649020)                                        The syste

m which



                                                                 generated this 

result



                                                                 transmitted ref

erence



                                                                 range: 0.0 - 10

.0 /100



                                                                 WBCs. The refer

ence



                                                                 range was not u

sed to



                                                                 interpret this 

result



                                                                 as normal/abnor

mal.

 

             NRBC x10^3 (test <0.01        See_Comment                [Automated

 message]



             code = 3761058350)                                        The syste

m which



                                                                 generated this 

result



                                                                 transmitted ref

erence



                                                                 range: 10*3/?L.

 The



                                                                 reference range

 was not



                                                                 used to interpr

et this



                                                                 result as



                                                                 normal/abnormal

.

 

             GRAN MAT (NEUT) % 58.6 %                                 



             (test code =                                        



             770-8)                                              

 

             IMM GRAN % (test 0.50 %                                 



             code = 9831988214)                                        

 

             LYMPH % (test code 31.3 %                                 



             = 736-9)                                            

 

             MONO % (test code 6.9 %                                  



             = 5905-5)                                           

 

             EOS % (test code = 2.4 %                                  



             713-8)                                              

 

             BASO % (test code 0.3 %                                  



             = 706-2)                                            

 

             GRAN MAT     5.39 10*3/uL 1.99-6.95                 



             x10^3(ANC) (test                                        



             code = 5789277383)                                        

 

             IMM GRAN x10^3 0.05 10*3/uL 0.00-0.06                 



             (test code =                                        



             0613131174)                                         

 

             LYMPH x10^3 (test 2.89 10*3/uL 1.09-3.23                 



             code = 731-0)                                        

 

             MONO x10^3 (test 0.64 10*3/uL 0.36-1.02                 



             code = 742-7)                                        

 

             EOS x10^3 (test 0.22 10*3/uL 0.06-0.53                 



             code = 711-2)                                        

 

             BASO x10^3 (test 0.03 10*3/uL 0.01-0.09                 



             code = 704-7)                                        



[facility] GLUCOSE (AUTOMATED)2022 09:06:33





             Test Item    Value        Reference Range Interpretation Comments

 

             POCT GLU (test code = 8492283099) 206 mg/dL           H      

      

 

             Lab Interpretation (test code = Abnormal                           

    



             05379-7)                                            



[facility] GLUCOSE (AUTOMATED)2022 04:38:41





             Test Item    Value        Reference Range Interpretation Comments

 

             POCT GLU (test code = 2335106457) 272 mg/dL           H      

      

 

             Lab Interpretation (test code = Abnormal                           

    



             11653-1)                                            



[facility] GLUCOSE (AUTOMATED)2022 01:14:47





             Test Item    Value        Reference Range Interpretation Comments

 

             POCT GLU (test code = 7286379701) 256 mg/dL           H      

      

 

             Lab Interpretation (test code = Abnormal                           

    



             87557-9)                                            



[facility] GLUCOSE (AUTOMATED)2022 21:11:19





             Test Item    Value        Reference Range Interpretation Comments

 

             POCT GLU (test code = 5296095420) 254 mg/dL           H      

      

 

             Lab Interpretation (test code = Abnormal                           

    



             85570-8)                                            



[facility] GLUCOSE (AUTOMATED)2022 17:10:33





             Test Item    Value        Reference Range Interpretation Comments

 

             POCT GLU (test code = 7276293602) 350 mg/dL           H      

      

 

             Lab Interpretation (test code = Abnormal                           

    



             36480-8)                                            



[facility] GLUCOSE (AUTOMATED)2022 12:50:18





             Test Item    Value        Reference Range Interpretation Comments

 

             POCT GLU (test code = 8043600582) 269 mg/dL           H      

      

 

             Lab Interpretation (test code = Abnormal                           

    



             47031-0)                                            



[facility] Metabolic Panel (NA, K, CL, CO2, 
GLUCOSE, BUN, CREATININE, CA)2022 10:18:27





             Test Item    Value        Reference Range Interpretation Comments

 

             NA (test code = 137 mmol/L   135-145                   



             5044080892)                                         

 

             K (test code = 4.0 mmol/L   3.5-5.0                   



             5290753017)                                         

 

             CL (test code = 108 mmol/L                       



             8204494725)                                         

 

             CO2 TOTAL (test code = 23 mmol/L    23-31                     



             3882235158)                                         

 

             AGAP (test code =              2-16                      



             0621056314)                                         

 

             BUN (test code = 16 mg/dL     7-23                      



             3409365254)                                         

 

             GLUCOSE (test code = 386 mg/dL           H            



             7954792926)                                         

 

             CREATININE (test code = 0.70 mg/dL   0.60-1.25                 



             8058177027)                                         

 

             CALCIUM (test code = 8.6 mg/dL    8.6-10.6                  



             1103360366)                                         

 

             eGFR (test code =              mL/min/1.73m2              



             4266789729)                                         

 

             MAL (test code = MAL) Association of                           



                          Glomerular Filtration                           



                          Rate (GFR) and Staging                           



                          of Kidney Disease*                           



                          +---------------------                           



                          --+-------------------                           



                          --+-------------------                           



                          ------+| GFR                           



                          (mL/min/1.73 m2) ?|                           



                          With Kidney Damage ?|                           



                          ?Without Kidney                           



                          Damage+---------------                           



                          --------+-------------                           



                          --------+-------------                           



                          ------------+| ?>90 ?                           



                          ? ? ? ? ? ? ? ?|                           



                          ?Stage one ? ? ? ? ?|                           



                          ? Normal ? ? ? ? ? ? ?                           



                          ?+--------------------                           



                          ---+------------------                           



                          ---+------------------                           



                          -------+| ?60-89 ? ? ?                           



                          ? ? ? ? ?| ?Stage two                           



                          ? ? ? ? ?| ? Decreased                           



                          GFR ? ? ? ?                            



                          +---------------------                           



                          --+-------------------                           



                          --+-------------------                           



                          ------+| ?30-59 ? ? ?                           



                          ? ? ? ? ?| ?Stage                           



                          three ? ? ? ?| ? Stage                           



                          three ? ? ? ? ?                           



                          +---------------------                           



                          --+-------------------                           



                          --+-------------------                           



                          ------+| ?15-29 ? ? ?                           



                          ? ? ? ? ?| ?Stage four                           



                          ? ? ? ? | ? Stage four                           



                          ? ? ? ? ?                              



                          ?+--------------------                           



                          ---+------------------                           



                          ---+------------------                           



                          -------+| ?<15 (or                           



                          dialysis) ? ?| ?Stage                           



                          five ? ? ? ? | ? Stage                           



                          five ? ? ? ? ?                           



                          ?+--------------------                           



                          ---+------------------                           



                          ---+------------------                           



                          -------+ *Each stage                           



                          assumes the associated                           



                          GFR level has been in                           



                          effect for at least                           



                          three months. ?Stages                           



                          1 to 5, with or                           



                          without kidney                           



                          disease, indicate                           



                          chronic kidney                           



                          disease. Notes:                           



                          Determination of                           



                          stages one and two                           



                          (with eGFR                             



                          >59mL/min/1.73 m2)                           



                          requires estimation of                           



                          kidney damage for at                           



                          least three months as                           



                          defined by structural                           



                          or functional                           



                          abnormalities of the                           



                          kidney, manifested by                           



                          either:Pathological                           



                          abnormalities or                           



                          Markers of kidney                           



                          damage (including                           



                          abnormalities in the                           



                          composition of the                           



                          blood or urine or                           



                          abnormalities in                           



                          imaging tests).                           

 

             Lab Interpretation Abnormal                               



             (test code = 56398-7)                                        



[facility] with [id number] 10:03:31





             Test Item    Value        Reference Range Interpretation Comments

 

             WBC (test code =              See_Comment                [Automated

 message]



             6690-2)                                             The system [employer]



                                                                 generated this 

result



                                                                 transmitted ref

erence



                                                                 range: 4.20 - 1

0.70



                                                                 10*3/?L. The re

ference



                                                                 range was not u

sed to



                                                                 interpret this 

result



                                                                 as normal/abnor

mal.

 

             RBC (test code =              See_Comment                [Automated

 message]



             789-8)                                              The system [employer]



                                                                 generated this 

result



                                                                 transmitted ref

erence



                                                                 range: 4.26 - 5

.52



                                                                 10*6/?L. The re

ference



                                                                 range was not u

sed to



                                                                 interpret this 

result



                                                                 as normal/abnor

mal.

 

             HGB (test code = 15.5 g/dL    12.2-16.4                 



             718-7)                                              

 

             HCT (test code = 45.0 %       38.4-49.3                 



             4544-3)                                             

 

             MCV (test code = 85.9 fL      81.7-95.6                 



             787-2)                                              

 

             MCH (test code = 29.6 pg      26.1-32.7                 



             785-6)                                              

 

             MCHC (test code = 34.4 g/dL    31.2-35.0                 



             786-4)                                              

 

             RDW-SD (test code 42.5 fL      38.5-51.6                 



             = 62110-7)                                          

 

             RDW-CV (test code 13.5 %       12.1-15.4                 



             = 788-0)                                            

 

             PLT (test code =              See_Comment                [Automated

 message]



             777-3)                                              The system [employer]

h



                                                                 generated this 

result



                                                                 transmitted ref

erence



                                                                 range: 150 - 32

8



                                                                 10*3/?L. The re

ference



                                                                 range was not u

sed to



                                                                 interpret this 

result



                                                                 as normal/abnor

mal.

 

             MPV (test code = 11.2 fL      9.8-13.0                  



             64980-8)                                            

 

             NRBC/100 WBC (test              See_Comment                [Automat

ed message]



             code = 3916193359)                                        The syste

m which



                                                                 generated this 

result



                                                                 transmitted ref

erence



                                                                 range: 0.0 - 10

.0 /100



                                                                 WBCs. The refer

ence



                                                                 range was not u

sed to



                                                                 interpret this 

result



                                                                 as normal/abnor

mal.

 

             NRBC x10^3 (test <0.01        See_Comment                [Automated

 message]



             code = 5562234348)                                        The syste

m which



                                                                 generated this 

result



                                                                 transmitted ref

erence



                                                                 range: 10*3/?L.

 The



                                                                 reference range

 was not



                                                                 used to interpr

et this



                                                                 result as



                                                                 normal/abnormal

.

 

             GRAN MAT (NEUT) % 63.5 %                                 



             (test code =                                        



             770-8)                                              

 

             IMM GRAN % (test 0.30 %                                 



             code = 8296669823)                                        

 

             LYMPH % (test code 24.8 %                                 



             = 736-9)                                            

 

             MONO % (test code 8.7 %                                  



             = 5905-5)                                           

 

             EOS % (test code = 2.5 %                                  



             713-8)                                              

 

             BASO % (test code 0.2 %                                  



             = 706-2)                                            

 

             GRAN MAT     6.05 10*3/uL 1.99-6.95                 



             x10^3(ANC) (test                                        



             code = 3808418656)                                        

 

             IMM GRAN x10^3 0.03 10*3/uL 0.00-0.06                 



             (test code =                                        



             5243822879)                                         

 

             LYMPH x10^3 (test 2.37 10*3/uL 1.09-3.23                 



             code = 731-0)                                        

 

             MONO x10^3 (test 0.83 10*3/uL 0.36-1.02                 



             code = 742-7)                                        

 

             EOS x10^3 (test 0.24 10*3/uL 0.06-0.53                 



             code = 711-2)                                        

 

             BASO x10^3 (test <0.03        0.01-0.09                 



             code = 704-7)                                        



[facility] GLUCOSE (AUTOMATED)2022 09:00:15





             Test Item    Value        Reference Range Interpretation Comments

 

             POCT GLU (test code = 7607963984) 402 mg/dL           H      

      

 

             Lab Interpretation (test code = Abnormal                           

    



             13145-7)                                            



[facility] GLUCOSE (AUTOMATED)2022 04:54:36





             Test Item    Value        Reference Range Interpretation Comments

 

             POCT GLU (test code = 4441074738) 368 mg/dL           H      

      

 

             Lab Interpretation (test code = Abnormal                           

    



             55166-3)                                            



[facility] GLUCOSE (AUTOMATED)2022 03:13:40





             Test Item    Value        Reference Range Interpretation Comments

 

             POCT GLU (test code = 8068928203) 438 mg/dL           H      

      

 

             Lab Interpretation (test code = Abnormal                           

    



             23436-7)                                            



[facility]. METABOLIC PANEL (51984)2022 
01:05:39





             Test Item    Value        Reference Range Interpretation Comments

 

             NA (test code = 133 mmol/L   135-145      L            



             9413255862)                                         

 

             K (test code = 4.7 mmol/L   3.5-5.0                   



             9875422464)                                         

 

             CL (test code = 99 mmol/L                        



             7430148262)                                         

 

             CO2 TOTAL (test code = 21 mmol/L    23-31        L            



             7213560318)                                         

 

             AGAP (test code =              2-16                      



             4345478829)                                         

 

             BUN (test code = 18 mg/dL     7-23                      



             0762784823)                                         

 

             GLUCOSE (test code = 660 mg/dL           HH           



             1917428004)                                         

 

             CREATININE (test code = 0.64 mg/dL   0.60-1.25                 



             6484269585)                                         

 

             TOTAL BILI (test code = 1.0 mg/dL    0.1-1.1                   



             2120075895)                                         

 

             CALCIUM (test code = 9.7 mg/dL    8.6-10.6                  



             7415141418)                                         

 

             T PROTEIN (test code = 7.9 g/dL     6.3-8.2                   



             6552806990)                                         

 

             ALBUMIN (test code = 4.4 g/dL     3.5-5.0                   



             7098812062)                                         

 

             ALK PHOS (test code = 143 U/L             H            



             6249253195)                                         

 

             ALTv (test code = 47 U/L       5-50                      



             2-6)                                             

 

             AST(SGOT) (test code = 30 U/L       13-40                     



             4540424604)                                         

 

             eGFR (test code =              mL/min/1.73m2              



             9013516713)                                         

 

             MAL (test code = MAL) Association of                           



                          Glomerular Filtration                           



                          Rate (GFR) and Staging                           



                          of Kidney Disease*                           



                          +---------------------                           



                          --+-------------------                           



                          --+-------------------                           



                          ------+| GFR                           



                          (mL/min/1.73 m2) ?|                           



                          With Kidney Damage ?|                           



                          ?Without Kidney                           



                          Damage+---------------                           



                          --------+-------------                           



                          --------+-------------                           



                          ------------+| ?>90 ?                           



                          ? ? ? ? ? ? ? ?|                           



                          ?Stage one ? ? ? ? ?|                           



                          ? Normal ? ? ? ? ? ? ?                           



                          ?+--------------------                           



                          ---+------------------                           



                          ---+------------------                           



                          -------+| ?60-89 ? ? ?                           



                          ? ? ? ? ?| ?Stage two                           



                          ? ? ? ? ?| ? Decreased                           



                          GFR ? ? ? ?                            



                          +---------------------                           



                          --+-------------------                           



                          --+-------------------                           



                          ------+| ?30-59 ? ? ?                           



                          ? ? ? ? ?| ?Stage                           



                          three ? ? ? ?| ? Stage                           



                          three ? ? ? ? ?                           



                          +---------------------                           



                          --+-------------------                           



                          --+-------------------                           



                          ------+| ?15-29 ? ? ?                           



                          ? ? ? ? ?| ?Stage four                           



                          ? ? ? ? | ? Stage four                           



                          ? ? ? ? ?                              



                          ?+--------------------                           



                          ---+------------------                           



                          ---+------------------                           



                          -------+| ?<15 (or                           



                          dialysis) ? ?| ?Stage                           



                          five ? ? ? ? | ? Stage                           



                          five ? ? ? ? ?                           



                          ?+--------------------                           



                          ---+------------------                           



                          ---+------------------                           



                          -------+ *Each stage                           



                          assumes the associated                           



                          GFR level has been in                           



                          effect for at least                           



                          three months. ?Stages                           



                          1 to 5, with or                           



                          without kidney                           



                          disease, indicate                           



                          chronic kidney                           



                          disease. Notes:                           



                          Determination of                           



                          stages one and two                           



                          (with eGFR                             



                          >59mL/min/1.73 m2)                           



                          requires estimation of                           



                          kidney damage for at                           



                          least three months as                           



                          defined by structural                           



                          or functional                           



                          abnormalities of the                           



                          kidney, manifested by                           



                          either:Pathological                           



                          abnormalities or                           



                          Markers of kidney                           



                          damage (including                           



                          abnormalities in the                           



                          composition of the                           



                          blood or urine or                           



                          abnormalities in                           



                          imaging tests).                           

 

             Lab Interpretation Abnormal                               



             (test code = 15437-6)                                        



[facility]





             Test Item    Value        Reference Range Interpretation Comments

 

             LIPASE (test code = 4943007608) 255 U/L      0-220        H        

    

 

             Lab Interpretation (test code = Abnormal                           

    



             93920-6)                                            



[id number] 00:38:14





             Test Item    Value        Reference Range Interpretation Comments

 

             WBC (test code =              See_Comment  H             [Automated



             6690-2)                                             message] The sy

stem



                                                                 which generated



                                                                 this result



                                                                 transmitted



                                                                 reference range

:



                                                                 4.20 - 10.70



                                                                 10*3/?L. The



                                                                 reference range

 was



                                                                 not used to



                                                                 interpret this



                                                                 result as



                                                                 normal/abnormal

.

 

             RBC (test code =              See_Comment  H             [Automated



             789-8)                                              message] The sy

stem



                                                                 which generated



                                                                 this result



                                                                 transmitted



                                                                 reference range

:



                                                                 4.26 - 5.52



                                                                 10*6/?L. The



                                                                 reference range

 was



                                                                 not used to



                                                                 interpret this



                                                                 result as



                                                                 normal/abnormal

.

 

             HGB (test code = 16.5 g/dL    12.2-16.4    H            



             718-7)                                              

 

             HCT (test code = 48.9 %       38.4-49.3                 



             4544-3)                                             

 

             MCV (test code = 87.2 fL      81.7-95.6                 



             787-2)                                              

 

             MCH (test code = 29.4 pg      26.1-32.7                 



             785-6)                                              

 

             MCHC (test code = 33.7 g/dL    31.2-35.0                 



             786-4)                                              

 

             RDW-SD (test code = 43.8 fL      38.5-51.6                 



             84346-0)                                            

 

             RDW-CV (test code = 13.6 %       12.1-15.4                 



             788-0)                                              

 

             PLT (test code =              See_Comment                [Automated



             777-3)                                              message] The sy

stem



                                                                 which generated



                                                                 this result



                                                                 transmitted



                                                                 reference range

:



                                                                 150 - 328 10*3/

?L.



                                                                 The reference r

zhen



                                                                 was not used to



                                                                 interpret this



                                                                 result as



                                                                 normal/abnormal

.

 

             MPV (test code = 11.4 fL      9.8-13.0                  



             96255-8)                                            

 

             NRBC/100 WBC (test              See_Comment                [Automat

ed



             code = 2448211777)                                        message] 

The system



                                                                 which generated



                                                                 this result



                                                                 transmitted



                                                                 reference range

:



                                                                 0.0 - 10.0 /100



                                                                 WBCs. The refer

ence



                                                                 range was not u

sed



                                                                 to interpret th

is



                                                                 result as



                                                                 normal/abnormal

.

 

             NRBC x10^3 (test code <0.01        See_Comment                [Auto

mated



             = 8452559972)                                        message] The s

ystem



                                                                 which generated



                                                                 this result



                                                                 transmitted



                                                                 reference range

:



                                                                 10*3/?L. The



                                                                 reference range

 was



                                                                 not used to



                                                                 interpret this



                                                                 result as



                                                                 normal/abnormal

.

 

             GRAN MAT (NEUT) % 76.0 %                                 



             (test code = 770-8)                                        

 

             IMM GRAN % (test code 0.50 %                                 



             = 1527188872)                                        

 

             LYMPH % (test code = 15.2 %                                 



             736-9)                                              

 

             MONO % (test code = 6.5 %                                  



             5905-5)                                             

 

             EOS % (test code = 1.6 %                                  



             713-8)                                              

 

             BASO % (test code = 0.2 %                                  



             706-2)                                              

 

             GRAN MAT x10^3(ANC) 8.55 10*3/uL 1.99-6.95    H            



             (test code =                                        



             7544042664)                                         

 

             IMM GRAN x10^3 (test 0.06 10*3/uL 0.00-0.06                 



             code = 9108622184)                                        

 

             LYMPH x10^3 (test code 1.71 10*3/uL 1.09-3.23                 



             = 731-0)                                            

 

             MONO x10^3 (test code 0.73 10*3/uL 0.36-1.02                 



             = 742-7)                                            

 

             EOS x10^3 (test code = 0.18 10*3/uL 0.06-0.53                 



             711-2)                                              

 

             BASO x10^3 (test code <0.03        0.01-0.09                 



             = 704-7)                                            

 

             Lab Interpretation Abnormal                               



             (test code = 83402-8)                                        



[facility]





             Test Item    Value        Reference Range Interpretation Comments

 

             MAGNESIUM (test code = 2092722598) 1.7 mg/dL    1.7-2.4            

       

 

             Lab Interpretation (test code = Normal                             

    



             81271-4)                                            



[facility] (NA, K, CL, CO2, 
GLUCOSE, BUN, CREATININE, CA)2022 18:32:38





             Test Item    Value        Reference Range Interpretation Comments

 

             NA (test code = 137 mmol/L   135-145                   



             5220460621)                                         

 

             K (test code = 4.3 mmol/L   3.5-5.0                   



             2277961590)                                         

 

             CL (test code = 105 mmol/L                       



             8826929824)                                         

 

             CO2 TOTAL (test code = 23 mmol/L    23-31                     



             5320238537)                                         

 

             AGAP (test code =              2-16                      



             6546088389)                                         

 

             BUN (test code = 17 mg/dL     7-23                      



             6335274666)                                         

 

             GLUCOSE (test code = 317 mg/dL           H            



             4664934305)                                         

 

             CREATININE (test code = 0.57 mg/dL   0.60-1.25    L            



             5213103090)                                         

 

             CALCIUM (test code = 9.3 mg/dL    8.6-10.6                  



             6825366866)                                         

 

             eGFR (test code =              mL/min/1.73m2              



             9786615912)                                         

 

             MAL (test code = MAL) Association of                           



                          Glomerular Filtration                           



                          Rate (GFR) and Staging                           



                          of Kidney Disease*                           



                          +---------------------                           



                          --+-------------------                           



                          --+-------------------                           



                          ------+| GFR                           



                          (mL/min/1.73 m2) ?|                           



                          With Kidney Damage ?|                           



                          ?Without Kidney                           



                          Damage+---------------                           



                          --------+-------------                           



                          --------+-------------                           



                          ------------+| ?>90 ?                           



                          ? ? ? ? ? ? ? ?|                           



                          ?Stage one ? ? ? ? ?|                           



                          ? Normal ? ? ? ? ? ? ?                           



                          ?+--------------------                           



                          ---+------------------                           



                          ---+------------------                           



                          -------+| ?60-89 ? ? ?                           



                          ? ? ? ? ?| ?Stage two                           



                          ? ? ? ? ?| ? Decreased                           



                          GFR ? ? ? ?                            



                          +---------------------                           



                          --+-------------------                           



                          --+-------------------                           



                          ------+| ?30-59 ? ? ?                           



                          ? ? ? ? ?| ?Stage                           



                          three ? ? ? ?| ? Stage                           



                          three ? ? ? ? ?                           



                          +---------------------                           



                          --+-------------------                           



                          --+-------------------                           



                          ------+| ?15-29 ? ? ?                           



                          ? ? ? ? ?| ?Stage four                           



                          ? ? ? ? | ? Stage four                           



                          ? ? ? ? ?                              



                          ?+--------------------                           



                          ---+------------------                           



                          ---+------------------                           



                          -------+| ?<15 (or                           



                          dialysis) ? ?| ?Stage                           



                          five ? ? ? ? | ? Stage                           



                          five ? ? ? ? ?                           



                          ?+--------------------                           



                          ---+------------------                           



                          ---+------------------                           



                          -------+ *Each stage                           



                          assumes the associated                           



                          GFR level has been in                           



                          effect for at least                           



                          three months. ?Stages                           



                          1 to 5, with or                           



                          without kidney                           



                          disease, indicate                           



                          chronic kidney                           



                          disease. Notes:                           



                          Determination of                           



                          stages one and two                           



                          (with eGFR                             



                          >59mL/min/1.73 m2)                           



                          requires estimation of                           



                          kidney damage for at                           



                          least three months as                           



                          defined by structural                           



                          or functional                           



                          abnormalities of the                           



                          kidney, manifested by                           



                          either:Pathological                           



                          abnormalities or                           



                          Markers of kidney                           



                          damage (including                           



                          abnormalities in the                           



                          composition of the                           



                          blood or urine or                           



                          abnormalities in                           



                          imaging tests).                           

 

             Lab Interpretation Abnormal                               



             (test code = 95413-8)                                        



[facility] WITH [id number] 18:10:18





             Test Item    Value        Reference Range Interpretation Comments

 

             WBC (test code =              See_Comment                [Automated

 message]



             6690-2)                                             The system [employer]



                                                                 generated this 

result



                                                                 transmitted ref

erence



                                                                 range: 4.20 - 1

0.70



                                                                 10*3/?L. The re

ference



                                                                 range was not u

sed to



                                                                 interpret this 

result



                                                                 as normal/abnor

mal.

 

             RBC (test code =              See_Comment                [Automated

 message]



             789-8)                                              The system [employer]



                                                                 generated this 

result



                                                                 transmitted ref

erence



                                                                 range: 4.26 - 5

.52



                                                                 10*6/?L. The re

ference



                                                                 range was not u

sed to



                                                                 interpret this 

result



                                                                 as normal/abnor

mal.

 

             HGB (test code = 16.0 g/dL    12.2-16.4                 



             718-7)                                              

 

             HCT (test code = 47.3 %       38.4-49.3                 



             4544-3)                                             

 

             MCV (test code = 87.1 fL      81.7-95.6                 



             787-2)                                              

 

             MCH (test code = 29.5 pg      26.1-32.7                 



             785-6)                                              

 

             MCHC (test code = 33.8 g/dL    31.2-35.0                 



             786-4)                                              

 

             RDW-SD (test code 44.4 fL      38.5-51.6                 



             = 76358-7)                                          

 

             RDW-CV (test code 13.8 %       12.1-15.4                 



             = 788-0)                                            

 

             PLT (test code =              See_Comment                [Automated

 message]



             997-3)                                              The system [employer]



                                                                 generated this 

result



                                                                 transmitted ref

erence



                                                                 range: 150 - 32

8



                                                                 10*3/?L. The re

ference



                                                                 range was not u

sed to



                                                                 interpret this 

result



                                                                 as normal/abnor

mal.

 

             MPV (test code = 10.9 fL      9.8-13.0                  



             94880-3)                                            

 

             NRBC/100 WBC (test              See_Comment                [Automat

ed message]



             code = 7278454595)                                        The syste

[employer] which



                                                                 generated this 

result



                                                                 transmitted ref

erence



                                                                 range: 0.0 - 10

.0 /100



                                                                 WBCs. The refer

ence



                                                                 range was not u

sed to



                                                                 interpret this 

result



                                                                 as normal/abnor

mal.

 

             NRBC x10^3 (test <0.01        See_Comment                [Automated

 message]



             code = 7382395110)                                        The syste

m which



                                                                 generated this 

result



                                                                 transmitted ref

erence



                                                                 range: 10*3/?L.

 The



                                                                 reference range

 was not



                                                                 used to interpr

et this



                                                                 result as



                                                                 normal/abnormal

.

 

             GRAN MAT (NEUT) % 53.4 %                                 



             (test code =                                        



             770-8)                                              

 

             IMM GRAN % (test 0.30 %                                 



             code = 7924159138)                                        

 

             LYMPH % (test code 35.3 %                                 



             = 736-9)                                            

 

             MONO % (test code 7.4 %                                  



             = 5905-5)                                           

 

             EOS % (test code = 3.3 %                                  



             713-8)                                              

 

             BASO % (test code 0.3 %                                  



             = 706-2)                                            

 

             GRAN MAT     3.59 10*3/uL 1.99-6.95                 



             x10^3(ANC) (test                                        



             code = 8311396860)                                        

 

             IMM GRAN x10^3 <0.03        0.00-0.06                 



             (test code =                                        



             7019891968)                                         

 

             LYMPH x10^3 (test 2.37 10*3/uL 1.09-3.23                 



             code = 731-0)                                        

 

             MONO x10^3 (test 0.50 10*3/uL 0.36-1.02                 



             code = 742-7)                                        

 

             EOS x10^3 (test 0.22 10*3/uL 0.06-0.53                 



             code = 711-2)                                        

 

             BASO x10^3 (test <0.03        0.01-0.09                 



             code = 704-7)                                        



[facility] GLUCOSE (AUTOMATED)2022 16:50:23





             Test Item    Value        Reference Range Interpretation Comments

 

             POCT GLU (test code = 8691254570) 304 mg/dL           H      

      

 

             Lab Interpretation (test code = Abnormal                           

    



             10007-0)                                            



[facility] GLUCOSE (AUTOMATED)2022 12:58:13





             Test Item    Value        Reference Range Interpretation Comments

 

             POCT GLU (test code = 1275761042) 364 mg/dL           H      

      

 

             Lab Interpretation (test code = Abnormal                           

    



             87490-3)                                            



[facility] GLUCOSE (AUTOMATED)2022 09:42:05





             Test Item    Value        Reference Range Interpretation Comments

 

             POCT GLU (test code = 3161468039) 369 mg/dL           H      

      

 

             Lab Interpretation (test code = Abnormal                           

    



             23215-0)                                            



[facility] GLUCOSE (AUTOMATED)2022 05:09:33





             Test Item    Value        Reference Range Interpretation Comments

 

             POCT GLU (test code = 9867511508) 332 mg/dL           H      

      

 

             Lab Interpretation (test code = Abnormal                           

    



             09405-9)                                            



[facility] GLUCOSE (AUTOMATED)2022 01:27:31





             Test Item    Value        Reference Range Interpretation Comments

 

             POCT GLU (test code = 8204462995) 349 mg/dL           H      

      

 

             Lab Interpretation (test code = Abnormal                           

    



             50196-4)                                            



[facility] GLUCOSE (AUTOMATED)2022 21:42:26





             Test Item    Value        Reference Range Interpretation Comments

 

             POCT GLU (test code = 3447065680) 372 mg/dL           H      

      

 

             Lab Interpretation (test code = Abnormal                           

    



             89430-2)                                            



[facility] GLUCOSE (AUTOMATED)2022 17:17:16





             Test Item    Value        Reference Range Interpretation Comments

 

             POCT GLU (test code = 9131959036) 329 mg/dL           H      

      

 

             Lab Interpretation (test code = Abnormal                           

    



             94534-6)                                            



[facility] GLUCOSE (AUTOMATED)2022 14:09:34





             Test Item    Value        Reference Range Interpretation Comments

 

             POCT GLU (test code = 3462611667) 350 mg/dL           H      

      

 

             Lab Interpretation (test code = Abnormal                           

    



             87788-3)                                            



[facility] GLUCOSE (AUTOMATED)2022 09:59:20





             Test Item    Value        Reference Range Interpretation Comments

 

             POCT GLU (test code = 1623077875) 342 mg/dL           H      

      

 

             Lab Interpretation (test code = Abnormal                           

    



             75120-2)                                            



[facility] GLUCOSE (AUTOMATED)2022 01:46:05





             Test Item    Value        Reference Range Interpretation Comments

 

             POCT GLU (test code = 5301340847) 318 mg/dL           H      

      

 

             Lab Interpretation (test code = Abnormal                           

    



             71815-9)                                            



[facility] GLUCOSE (AUTOMATED)2022 21:25:33





             Test Item    Value        Reference Range Interpretation Comments

 

             POCT GLU (test code = 8520272087) 212 mg/dL           H      

      

 

             Lab Interpretation (test code = Abnormal                           

    



             35226-7)                                            



[facility] GLUCOSE (AUTOMATED)2022 16:27:52





             Test Item    Value        Reference Range Interpretation Comments

 

             POCT GLU (test code = 6010524139) 226 mg/dL           H      

      

 

             Lab Interpretation (test code = Abnormal                           

    



             55147-4)                                            



[facility] GLUCOSE (AUTOMATED)2022 15:37:46





             Test Item    Value        Reference Range Interpretation Comments

 

             POCT GLU (test code = 4088461737) 204 mg/dL           H      

      

 

             Lab Interpretation (test code = Abnormal                           

    



             11661-9)                                            



[facility] 14:39:25





             Test Item    Value        Reference Range Interpretation Comments

 

             POCT GLU (test code = 5290891231) 185 mg/dL           H      

      

 

             Lab Interpretation (test code = Abnormal                           

    



             31620-6)                                            



[facility] Metabolic Panel (Na, K, Cl, CO2, 
Glucose, BUN, Creatinine, Ca)2022 14:07:03





             Test Item    Value        Reference Range Interpretation Comments

 

             NA (test code = 137 mmol/L   135-145                   



             1891459202)                                         

 

             K (test code = 4.6 mmol/L   3.5-5.0                   



             8069160545)                                         

 

             CL (test code = 111 mmol/L          H            



             5245831589)                                         

 

             CO2 TOTAL (test code = 22 mmol/L    23-31        L            



             6103137501)                                         

 

             AGAP (test code =              2-16                      



             2646713892)                                         

 

             BUN (test code = 12 mg/dL     7-23                      



             7886988595)                                         

 

             GLUCOSE (test code = 181 mg/dL           H            



             3385130891)                                         

 

             CREATININE (test code = 0.70 mg/dL   0.60-1.25                 



             5286825498)                                         

 

             CALCIUM (test code = 8.3 mg/dL    8.6-10.6     L            



             4966441804)                                         

 

             eGFR (test code =              mL/min/1.73m2              



             0399278638)                                         

 

             MAL (test code = MAL) Association of                           



                          Glomerular Filtration                           



                          Rate (GFR) and Staging                           



                          of Kidney Disease*                           



                          +---------------------                           



                          --+-------------------                           



                          --+-------------------                           



                          ------+| GFR                           



                          (mL/min/1.73 m2) ?|                           



                          With Kidney Damage ?|                           



                          ?Without Kidney                           



                          Damage+---------------                           



                          --------+-------------                           



                          --------+-------------                           



                          ------------+| ?>90 ?                           



                          ? ? ? ? ? ? ? ?|                           



                          ?Stage one ? ? ? ? ?|                           



                          ? Normal ? ? ? ? ? ? ?                           



                          ?+--------------------                           



                          ---+------------------                           



                          ---+------------------                           



                          -------+| ?60-89 ? ? ?                           



                          ? ? ? ? ?| ?Stage two                           



                          ? ? ? ? ?| ? Decreased                           



                          GFR ? ? ? ?                            



                          +---------------------                           



                          --+-------------------                           



                          --+-------------------                           



                          ------+| ?30-59 ? ? ?                           



                          ? ? ? ? ?| ?Stage                           



                          three ? ? ? ?| ? Stage                           



                          three ? ? ? ? ?                           



                          +---------------------                           



                          --+-------------------                           



                          --+-------------------                           



                          ------+| ?15-29 ? ? ?                           



                          ? ? ? ? ?| ?Stage four                           



                          ? ? ? ? | ? Stage four                           



                          ? ? ? ? ?                              



                          ?+--------------------                           



                          ---+------------------                           



                          ---+------------------                           



                          -------+| ?<15 (or                           



                          dialysis) ? ?| ?Stage                           



                          five ? ? ? ? | ? Stage                           



                          five ? ? ? ? ?                           



                          ?+--------------------                           



                          ---+------------------                           



                          ---+------------------                           



                          -------+ *Each stage                           



                          assumes the associated                           



                          GFR level has been in                           



                          effect for at least                           



                          three months. ?Stages                           



                          1 to 5, with or                           



                          without kidney                           



                          disease, indicate                           



                          chronic kidney                           



                          disease. Notes:                           



                          Determination of                           



                          stages one and two                           



                          (with eGFR                             



                          >59mL/min/1.73 m2)                           



                          requires estimation of                           



                          kidney damage for at                           



                          least three months as                           



                          defined by structural                           



                          or functional                           



                          abnormalities of the                           



                          kidney, manifested by                           



                          either:Pathological                           



                          abnormalities or                           



                          Markers of kidney                           



                          damage (including                           



                          abnormalities in the                           



                          composition of the                           



                          blood or urine or                           



                          abnormalities in                           



                          imaging tests).                           

 

             Lab Interpretation Abnormal                               



             (test code = 48110-8)                                        



[facility] GLUCOSE (AUTOMATED)2022 13:45:48





             Test Item    Value        Reference Range Interpretation Comments

 

             POCT GLU (test code = 3662979592) 170 mg/dL           H      

      

 

             Lab Interpretation (test code = Abnormal                           

    



             90425-0)                                            



[facility] GLUCOSE (AUTOMATED)2022 12:28:12





             Test Item    Value        Reference Range Interpretation Comments

 

             POCT GLU (test code = 6750884950) 109 mg/dL                  

      

 

             Lab Interpretation (test code = Normal                             

    



             67646-5)                                            



[facility] GLUCOSE (AUTOMATED)2022 11:25:37





             Test Item    Value        Reference Range Interpretation Comments

 

             POCT GLU (test code = 0671835443) 134 mg/dL           H      

      

 

             Lab Interpretation (test code = Abnormal                           

    



             70536-6)                                            



[facility] Metabolic Panel (Na, K, Cl, CO2, 
Glucose, BUN, Creatinine, Ca)2022 10:50:17





             Test Item    Value        Reference Range Interpretation Comments

 

             NA (test code = 138 mmol/L   135-145                   



             6298620555)                                         

 

             K (test code = 5.1 mmol/L   3.5-5.0      H            Slight



             [id number])                                         hemolysis

 

             CL (test code = 110 mmol/L          H            



             5011906627)                                         

 

             CO2 TOTAL (test code 23 mmol/L    23-31                     



             = 7960734920)                                        

 

             AGAP (test code =              2-16                      



             3921015911)                                         

 

             BUN (test code = 13 mg/dL     7-23                      Slight



             3040470049)                                         hemolysis

 

             GLUCOSE (test code = 131 mg/dL           H            



             4707144755)                                         

 

             CREATININE (test code 0.73 mg/dL   0.60-1.25                 



             = 3379532173)                                        

 

             CALCIUM (test code = 8.9 mg/dL    8.6-10.6                  



             7068478066)                                         

 

             eGFR (test code =              mL/min/1.73m2              



             9316118440)                                         

 

             MAL (test code = MAL) Association of                           



                          Glomerular                             



                          Filtration Rate                           



                          (GFR) and Staging                           



                          of Kidney Disease*                           



                          +------------------                           



                          -----+-------------                           



                          --------+----------                           



                          ---------------+|                           



                          GFR (mL/min/1.73                           



                          m2) ?| With Kidney                           



                          Damage ?| ?Without                           



                          Kidney                                 



                          Damage+------------                           



                          -----------+-------                           



                          --------------+----                           



                          -------------------                           



                          --+| ?>90 ? ? ? ? ?                           



                          ? ? ? ?| ?Stage one                           



                          ? ? ? ? ?| ? Normal                           



                          ? ? ? ? ? ? ?                           



                          ?+-----------------                           



                          ------+------------                           



                          ---------+---------                           



                          ----------------+|                           



                          ?60-89 ? ? ? ? ? ?                           



                          ? ?| ?Stage two ? ?                           



                          ? ? ?| ? Decreased                           



                          GFR ? ? ? ?                            



                          +------------------                           



                          -----+-------------                           



                          --------+----------                           



                          ---------------+|                           



                          ?30-59 ? ? ? ? ? ?                           



                          ? ?| ?Stage three ?                           



                          ? ? ?| ? Stage                           



                          three ? ? ? ? ?                           



                          +------------------                           



                          -----+-------------                           



                          --------+----------                           



                          ---------------+|                           



                          ?15-29 ? ? ? ? ? ?                           



                          ? ?| ?Stage four ?                           



                          ? ? ? | ? Stage                           



                          four ? ? ? ? ?                           



                          ?+-----------------                           



                          ------+------------                           



                          ---------+---------                           



                          ----------------+|                           



                          ?<15 (or dialysis)                           



                          ? ?| ?Stage five ?                           



                          ? ? ? | ? Stage                           



                          five ? ? ? ? ?                           



                          ?+-----------------                           



                          ------+------------                           



                          ---------+---------                           



                          ----------------+                           



                          *Each stage assumes                           



                          the associated GFR                           



                          level has been in                           



                          effect for at least                           



                          three months.                           



                          ?Stages 1 to 5,                           



                          with or without                           



                          kidney disease,                           



                          indicate chronic                           



                          kidney disease.                           



                          Notes:                                 



                          Determination of                           



                          stages one and two                           



                          (with eGFR                             



                          >59mL/min/1.73 m2)                           



                          requires estimation                           



                          of kidney damage                           



                          for at least three                           



                          months as defined                           



                          by structural or                           



                          functional                             



                          abnormalities of                           



                          the kidney,                            



                          manifested by                           



                          either:Pathological                           



                          abnormalities or                           



                          Markers of kidney                           



                          damage (including                           



                          abnormalities in                           



                          the composition of                           



                          the blood or urine                           



                          or abnormalities in                           



                          imaging tests).                           

 

             Lab Interpretation Abnormal                               



             (test code = 25943-2)                                        



[facility] GLUCOSE (AUTOMATED)2022 10:35:11





             Test Item    Value        Reference Range Interpretation Comments

 

             POCT GLU (test code = 9472101162) 125 mg/dL           H      

      

 

             Lab Interpretation (test code = Abnormal                           

    



             94573-4)                                            



[facility] GLUCOSE (AUTOMATED)2022 09:31:05





             Test Item    Value        Reference Range Interpretation Comments

 

             POCT GLU (test code = 4787000104) 137 mg/dL           H      

      

 

             Lab Interpretation (test code = Abnormal                           

    



             12634-9)                                            



[facility] GLUCOSE (AUTOMATED)2022 08:28:48





             Test Item    Value        Reference Range Interpretation Comments

 

             POCT GLU (test code = 0473542612) 168 mg/dL           H      

      

 

             Lab Interpretation (test code = Abnormal                           

    



             80263-5)                                            



[facility] GLUCOSE (AUTOMATED)2022 07:26:17





             Test Item    Value        Reference Range Interpretation Comments

 

             POCT GLU (test code = 3033417784) 165 mg/dL           H      

      

 

             Lab Interpretation (test code = Abnormal                           

    



             34454-6)                                            



[facility] Metabolic Panel (Na, K, Cl, CO2, 
Glucose, BUN, Creatinine, Ca)2022 07:18:59





             Test Item    Value        Reference Range Interpretation Comments

 

             NA (test code = 136 mmol/L   135-145                   



             3132466317)                                         

 

             K (test code = 5.1 mmol/L   3.5-5.0      H            



             2685410022)                                         

 

             CL (test code = 108 mmol/L                       



             1598214700)                                         

 

             CO2 TOTAL (test code = 24 mmol/L    23-31                     



             5213886965)                                         

 

             AGAP (test code =              2-16                      



             2576048929)                                         

 

             BUN (test code = 14 mg/dL     7-23                      



             2884001607)                                         

 

             GLUCOSE (test code = 202 mg/dL           H            



             8177973462)                                         

 

             CREATININE (test code = 0.79 mg/dL   0.60-1.25                 



             6998429995)                                         

 

             CALCIUM (test code = 8.9 mg/dL    8.6-10.6                  



             3210566320)                                         

 

             eGFR (test code =              mL/min/1.73m2              



             2740754997)                                         

 

             MAL (test code = MAL) Association of                           



                          Glomerular Filtration                           



                          Rate (GFR) and Staging                           



                          of Kidney Disease*                           



                          +---------------------                           



                          --+-------------------                           



                          --+-------------------                           



                          ------+| GFR                           



                          (mL/min/1.73 m2) ?|                           



                          With Kidney Damage ?|                           



                          ?Without Kidney                           



                          Damage+---------------                           



                          --------+-------------                           



                          --------+-------------                           



                          ------------+| ?>90 ?                           



                          ? ? ? ? ? ? ? ?|                           



                          ?Stage one ? ? ? ? ?|                           



                          ? Normal ? ? ? ? ? ? ?                           



                          ?+--------------------                           



                          ---+------------------                           



                          ---+------------------                           



                          -------+| ?60-89 ? ? ?                           



                          ? ? ? ? ?| ?Stage two                           



                          ? ? ? ? ?| ? Decreased                           



                          GFR ? ? ? ?                            



                          +---------------------                           



                          --+-------------------                           



                          --+-------------------                           



                          ------+| ?30-59 ? ? ?                           



                          ? ? ? ? ?| ?Stage                           



                          three ? ? ? ?| ? Stage                           



                          three ? ? ? ? ?                           



                          +---------------------                           



                          --+-------------------                           



                          --+-------------------                           



                          ------+| ?15-29 ? ? ?                           



                          ? ? ? ? ?| ?Stage four                           



                          ? ? ? ? | ? Stage four                           



                          ? ? ? ? ?                              



                          ?+--------------------                           



                          ---+------------------                           



                          ---+------------------                           



                          -------+| ?<15 (or                           



                          dialysis) ? ?| ?Stage                           



                          five ? ? ? ? | ? Stage                           



                          five ? ? ? ? ?                           



                          ?+--------------------                           



                          ---+------------------                           



                          ---+------------------                           



                          -------+ *Each stage                           



                          assumes the associated                           



                          GFR level has been in                           



                          effect for at least                           



                          three months. ?Stages                           



                          1 to 5, with or                           



                          without kidney                           



                          disease, indicate                           



                          chronic kidney                           



                          disease. Notes:                           



                          Determination of                           



                          stages one and two                           



                          (with eGFR                             



                          >59mL/min/1.73 m2)                           



                          requires estimation of                           



                          kidney damage for at                           



                          least three months as                           



                          defined by structural                           



                          or functional                           



                          abnormalities of the                           



                          kidney, manifested by                           



                          either:Pathological                           



                          abnormalities or                           



                          Markers of kidney                           



                          damage (including                           



                          abnormalities in the                           



                          composition of the                           



                          blood or urine or                           



                          abnormalities in                           



                          imaging tests).                           

 

             Lab Interpretation Abnormal                               



             (test code = 70227-7)                                        



[id number]57:08





             Test Item    Value        Reference Range Interpretation Comments

 

             BOH (test code = <0.1         mmol/L                    



             8323623895)                                         

 

             MAL (test code = Normal Ranges: ? ?                           



             MAL)         Nonfasting ? ? ? ? ? Less                           



                          than 0.1 mmol/L ? ?                           



                          Overnight Fast ? ? ? Less                           



                          than 0.4 mmol/L ? ? Fasting                           



                          (1-2 weeks) ?6-8 mmol/L Test                          

 



                          developed and                           



                          characteristics determined                           



                          by [facility].                          

 



[facility] GLUCOSE (AUTOMATED)2022 06:24:07





             Test Item    Value        Reference Range Interpretation Comments

 

             POCT GLU (test code = 5402659266) 212 mg/dL           H      

      

 

             Lab Interpretation (test code = Abnormal                           

    



             19565-0)                                            



[facility] GLUCOSE (AUTOMATED)2022 05:28:19





             Test Item    Value        Reference Range Interpretation Comments

 

             POCT GLU (test code = 4403075207) 239 mg/dL           H      

      

 

             Lab Interpretation (test code = Abnormal                           

    



             27569-7)                                            



[facility] GLUCOSE (AUTOMATED)2022 04:29:31





             Test Item    Value        Reference Range Interpretation Comments

 

             POCT GLU (test code = 6238064091) 251 mg/dL           H      

      

 

             Lab Interpretation (test code = Abnormal                           

    



             72278-0)                                            



[facility]Na, K, Cl, CO2, 
Glucose, BUN, Creatinine, Ca)2022 03:49:55





             Test Item    Value        Reference Range Interpretation Comments

 

             NA (test code = 141 mmol/L   135-145                   



             [id number])                                         

 

             K (test code = 4.2 mmol/L   3.5-5.0                   



             9629122600)                                         

 

             CL (test code = 108 mmol/L                       



             7135007812)                                         

 

             CO2 TOTAL (test code = 26 mmol/L    23-31                     



             0452663820)                                         

 

             AGAP (test code =              2-16                      



             5945184142)                                         

 

             BUN (test code = 14 mg/dL     7-23                      



             0791224119)                                         

 

             GLUCOSE (test code = 164 mg/dL           H            



             6508302819)                                         

 

             CREATININE (test code = 0.84 mg/dL   0.60-1.25                 



             2093491512)                                         

 

             CALCIUM (test code = 9.3 mg/dL    8.6-10.6                  



             3712980517)                                         

 

             eGFR (test code =              mL/min/1.73m2              



             3267563172)                                         

 

             MLA (test code = MAL) Association of                           



                          Glomerular Filtration                           



                          Rate (GFR) and Staging                           



                          of Kidney Disease*                           



                          +---------------------                           



                          --+-------------------                           



                          --+-------------------                           



                          ------+| GFR                           



                          (mL/min/1.73 m2) ?|                           



                          With Kidney Damage ?|                           



                          ?Without Kidney                           



                          Damage+---------------                           



                          --------+-------------                           



                          --------+-------------                           



                          ------------+| ?>90 ?                           



                          ? ? ? ? ? ? ? ?|                           



                          ?Stage one ? ? ? ? ?|                           



                          ? Normal ? ? ? ? ? ? ?                           



                          ?+--------------------                           



                          ---+------------------                           



                          ---+------------------                           



                          -------+| ?60-89 ? ? ?                           



                          ? ? ? ? ?| ?Stage two                           



                          ? ? ? ? ?| ? Decreased                           



                          GFR ? ? ? ?                            



                          +---------------------                           



                          --+-------------------                           



                          --+-------------------                           



                          ------+| ?30-59 ? ? ?                           



                          ? ? ? ? ?| ?Stage                           



                          three ? ? ? ?| ? Stage                           



                          three ? ? ? ? ?                           



                          +---------------------                           



                          --+-------------------                           



                          --+-------------------                           



                          ------+| ?15-29 ? ? ?                           



                          ? ? ? ? ?| ?Stage four                           



                          ? ? ? ? | ? Stage four                           



                          ? ? ? ? ?                              



                          ?+--------------------                           



                          ---+------------------                           



                          ---+------------------                           



                          -------+| ?<15 (or                           



                          dialysis) ? ?| ?Stage                           



                          five ? ? ? ? | ? Stage                           



                          five ? ? ? ? ?                           



                          ?+--------------------                           



                          ---+------------------                           



                          ---+------------------                           



                          -------+ *Each stage                           



                          assumes the associated                           



                          GFR level has been in                           



                          effect for at least                           



                          three months. ?Stages                           



                          1 to 5, with or                           



                          without kidney                           



                          disease, indicate                           



                          chronic kidney                           



                          disease. Notes:                           



                          Determination of                           



                          stages one and two                           



                          (with eGFR                             



                          >59mL/min/1.73 m2)                           



                          requires estimation of                           



                          kidney damage for at                           



                          least three months as                           



                          defined by structural                           



                          or functional                           



                          abnormalities of the                           



                          kidney, manifested by                           



                          either:Pathological                           



                          abnormalities or                           



                          Markers of kidney                           



                          damage (including                           



                          abnormalities in the                           



                          composition of the                           



                          blood or urine or                           



                          abnormalities in                           



                          imaging tests).                           

 

             Lab Interpretation Abnormal                               



             (test code = 59404-9)                                        



[facility] GLUCOSE (AUTOMATED)2022 03:20:15





             Test Item    Value        Reference Range Interpretation Comments

 

             POCT GLU (test code = 4598155777) 174 mg/dL           H      

      

 

             Lab Interpretation (test code = Abnormal                           

    



             52511-1)                                            



[facility] GLUCOSE (AUTOMATED)2022 02:25:54





             Test Item    Value        Reference Range Interpretation Comments

 

             POCT GLU (test code = 1088524654) 158 mg/dL           H      

      

 

             Lab Interpretation (test code = Abnormal                           

    



             46242-1)                                            



[facility] GLUCOSE (AUTOMATED)2022 01:37:09





             Test Item    Value        Reference Range Interpretation Comments

 

             POCT GLU (test code = 9028520532) 219 mg/dL           H      

      

 

             Lab Interpretation (test code = Abnormal                           

    



             57345-7)                                            



[facility] GLUCOSE (AUTOMATED)2022 00:19:48





             Test Item    Value        Reference Range Interpretation Comments

 

             POCT GLU (test code = 9273113987) 345 mg/dL           H      

      

 

             Lab Interpretation (test code = Abnormal                           

    



             92737-7)                                            



[facility] GLUCOSE (AUTOMATED)2022 22:59:57





             Test Item    Value        Reference Range Interpretation Comments

 

             POCT GLU (test code = 4693625072) 318 mg/dL           H      

      

 

             Lab Interpretation (test code = Abnormal                           

    



             37551-5)                                            



[facility] (Na, K, Cl, CO2, 
Glucose, BUN, Creatinine, Ca)2022 22:41:00





             Test Item    Value        Reference Range Interpretation Comments

 

             NA (test code = 140 mmol/L   135-145                   



             7900768402)                                         

 

             K (test code = 3.1 mmol/L   3.5-5.0      L            



             6019890150)                                         

 

             CL (test code = 116 mmol/L          H            



             2134063652)                                         

 

             CO2 TOTAL (test code = 21 mmol/L    23-31        L            



             4788978398)                                         

 

             AGAP (test code =              2-16                      



             3023504639)                                         

 

             BUN (test code = 12 mg/dL     7-23                      



             1118246854)                                         

 

             GLUCOSE (test code = 281 mg/dL           H            



             8245990719)                                         

 

             CREATININE (test code = 0.62 mg/dL   0.60-1.25                 



             0801585156)                                         

 

             CALCIUM (test code = 7.2 mg/dL    8.6-10.6     L            



             9204151897)                                         

 

             eGFR (test code =              mL/min/1.73m2              



             1526723042)                                         

 

             MAL (test code = MAL) Association of                           



                          Glomerular Filtration                           



                          Rate (GFR) and Staging                           



                          of Kidney Disease*                           



                          +---------------------                           



                          --+-------------------                           



                          --+-------------------                           



                          ------+| GFR                           



                          (mL/min/1.73 m2) ?|                           



                          With Kidney Damage ?|                           



                          ?Without Kidney                           



                          Damage+---------------                           



                          --------+-------------                           



                          --------+-------------                           



                          ------------+| ?>90 ?                           



                          ? ? ? ? ? ? ? ?|                           



                          ?Stage one ? ? ? ? ?|                           



                          ? Normal ? ? ? ? ? ? ?                           



                          ?+--------------------                           



                          ---+------------------                           



                          ---+------------------                           



                          -------+| ?60-89 ? ? ?                           



                          ? ? ? ? ?| ?Stage two                           



                          ? ? ? ? ?| ? Decreased                           



                          GFR ? ? ? ?                            



                          +---------------------                           



                          --+-------------------                           



                          --+-------------------                           



                          ------+| ?30-59 ? ? ?                           



                          ? ? ? ? ?| ?Stage                           



                          three ? ? ? ?| ? Stage                           



                          three ? ? ? ? ?                           



                          +---------------------                           



                          --+-------------------                           



                          --+-------------------                           



                          ------+| ?15-29 ? ? ?                           



                          ? ? ? ? ?| ?Stage four                           



                          ? ? ? ? | ? Stage four                           



                          ? ? ? ? ?                              



                          ?+--------------------                           



                          ---+------------------                           



                          ---+------------------                           



                          -------+| ?<15 (or                           



                          dialysis) ? ?| ?Stage                           



                          five ? ? ? ? | ? Stage                           



                          five ? ? ? ? ?                           



                          ?+--------------------                           



                          ---+------------------                           



                          ---+------------------                           



                          -------+ *Each stage                           



                          assumes the associated                           



                          GFR level has been in                           



                          effect for at least                           



                          three months. ?Stages                           



                          1 to 5, with or                           



                          without kidney                           



                          disease, indicate                           



                          chronic kidney                           



                          disease. Notes:                           



                          Determination of                           



                          stages one and two                           



                          (with eGFR                             



                          >59mL/min/1.73 m2)                           



                          requires estimation of                           



                          kidney damage for at                           



                          least three months as                           



                          defined by structural                           



                          or functional                           



                          abnormalities of the                           



                          kidney, manifested by                           



                          either:Pathological                           



                          abnormalities or                           



                          Markers of kidney                           



                          damage (including                           



                          abnormalities in the                           



                          composition of the                           



                          blood or urine or                           



                          abnormalities in                           



                          imaging tests).                           

 

             Lab Interpretation Abnormal                               



             (test code = 56819-3)                                        



[facility] GLUCOSE (AUTOMATED)2022 21:53:29





             Test Item    Value        Reference Range Interpretation Comments

 

             POCT GLU (test code = 6668263297) 368 mg/dL           H      

      

 

             Lab Interpretation (test code = Abnormal                           

    



             71999-3)                                            



[facility] GLUCOSE (AUTOMATED)2022 20:41:51





             Test Item    Value        Reference Range Interpretation Comments

 

             POCT GLU (test code = 9248086734) 458 mg/dL           HH     

      

 

             Lab Interpretation (test code = Abnormal                           

    



             76866-0)                                            



[facility] 19:00:13





             Test Item    Value        Reference Range Interpretation Comments

 

             POCT GLU (test code = 2179371557) 369 mg/dL           H      

      

 

             Lab Interpretation (test code = Abnormal                           

    



             99963-6)                                            



[facility] Metabolic Panel (Na, K, Cl, CO2, 
Glucose, BUN, Creatinine, Ca)2022 18:32:29





             Test Item    Value        Reference Range Interpretation Comments

 

             NA (test code = 145 mmol/L   135-145                   



             7187053572)                                         

 

             K (test code = 4.8 mmol/L   3.5-5.0                   Slight



             2222606515)                                         hemolysis

 

             CL (test code = 112 mmol/L          H            



             7615278214)                                         

 

             CO2 TOTAL (test code 26 mmol/L    23-31                     



             = 3213371722)                                        

 

             AGAP (test code =              2-16                      



             1266342331)                                         

 

             BUN (test code = 16 mg/dL     7-23                      Slight



             4867463473)                                         hemolysis

 

             GLUCOSE (test code = 282 mg/dL           H            



             4507935357)                                         

 

             CREATININE (test code 0.83 mg/dL   0.60-1.25                 



             = 0076460751)                                        

 

             CALCIUM (test code = 10.0 mg/dL   8.6-10.6                  



             3371382546)                                         

 

             eGFR (test code =              mL/min/1.73m2              



             7429538286)                                         

 

             MAL (test code = MAL) Association of                           



                          Glomerular                             



                          Filtration Rate                           



                          (GFR) and Staging                           



                          of Kidney Disease*                           



                          +------------------                           



                          -----+-------------                           



                          --------+----------                           



                          ---------------+|                           



                          GFR (mL/min/1.73                           



                          m2) ?| With Kidney                           



                          Damage ?| ?Without                           



                          Kidney                                 



                          Damage+------------                           



                          -----------+-------                           



                          --------------+----                           



                          -------------------                           



                          --+| ?>90 ? ? ? ? ?                           



                          ? ? ? ?| ?Stage one                           



                          ? ? ? ? ?| ? Normal                           



                          ? ? ? ? ? ? ?                           



                          ?+-----------------                           



                          ------+------------                           



                          ---------+---------                           



                          ----------------+|                           



                          ?60-89 ? ? ? ? ? ?                           



                          ? ?| ?Stage two ? ?                           



                          ? ? ?| ? Decreased                           



                          GFR ? ? ? ?                            



                          +------------------                           



                          -----+-------------                           



                          --------+----------                           



                          ---------------+|                           



                          ?30-59 ? ? ? ? ? ?                           



                          ? ?| ?Stage three ?                           



                          ? ? ?| ? Stage                           



                          three ? ? ? ? ?                           



                          +------------------                           



                          -----+-------------                           



                          --------+----------                           



                          ---------------+|                           



                          ?15-29 ? ? ? ? ? ?                           



                          ? ?| ?Stage four ?                           



                          ? ? ? | ? Stage                           



                          four ? ? ? ? ?                           



                          ?+-----------------                           



                          ------+------------                           



                          ---------+---------                           



                          ----------------+|                           



                          ?<15 (or dialysis)                           



                          ? ?| ?Stage five ?                           



                          ? ? ? | ? Stage                           



                          five ? ? ? ? ?                           



                          ?+-----------------                           



                          ------+------------                           



                          ---------+---------                           



                          ----------------+                           



                          *Each stage assumes                           



                          the associated GFR                           



                          level has been in                           



                          effect for at least                           



                          three months.                           



                          ?Stages 1 to 5,                           



                          with or without                           



                          kidney disease,                           



                          indicate chronic                           



                          kidney disease.                           



                          Notes:                                 



                          Determination of                           



                          stages one and two                           



                          (with eGFR                             



                          >59mL/min/1.73 m2)                           



                          requires estimation                           



                          of kidney damage                           



                          for at least three                           



                          months as defined                           



                          by structural or                           



                          functional                             



                          abnormalities of                           



                          the kidney,                            



                          manifested by                           



                          either:Pathological                           



                          abnormalities or                           



                          Markers of kidney                           



                          damage (including                           



                          abnormalities in                           



                          the composition of                           



                          the blood or urine                           



                          or abnormalities in                           



                          imaging tests).                           

 

             Lab Interpretation Abnormal                               



             (test code = 31387-4)                                        



[facility] GLUCOSE (AUTOMATED)2022 17:45:09





             Test Item    Value        Reference Range Interpretation Comments

 

             POCT GLU (test code = 5846212029) 284 mg/dL           H      

      

 

             Lab Interpretation (test code = Abnormal                           

    



             12136-0)                                            



[id number]13





             Test Item    Value        Reference Range Interpretation Comments

 

             BOH (test code = 0.9 mmol/L                             



             5809237754)                                         

 

             MAL (test code = Normal Ranges: ? ?                           



             MAL)         Nonfasting ? ? ? ? ? Less                           



                          than 0.1 mmol/L ? ?                           



                          Overnight Fast ? ? ? Less                           



                          than 0.4 mmol/L ? ? Fasting                           



                          (1-2 weeks) ?6-8 mmol/L Test                          

 



                          developed and                           



                          characteristics determined                           



                          by [facility].                          

 



[facility] GLUCOSE (AUTOMATED)2022 16:33:41





             Test Item    Value        Reference Range Interpretation Comments

 

             POCT GLU (test code = 6200296043) 266 mg/dL           H      

      

 

             Lab Interpretation (test code = Abnormal                           

    



             60999-7)                                            



[facility] GLUCOSE (AUTOMATED)2022 15:30:41





             Test Item    Value        Reference Range Interpretation Comments

 

             POCT GLU (test code = 5954302618) 294 mg/dL           H      

      

 

             Lab Interpretation (test code = Abnormal                           

    



             60046-5)                                            



[facility] GLUCOSE (AUTOMATED)2022 14:23:40





             Test Item    Value        Reference Range Interpretation Comments

 

             POCT GLU (test code = 9313339881) 511 mg/dL           HH     

      

 

             Lab Interpretation (test code = Abnormal                           

    



             35224-6)                                            



[facility] WITHOUT [id number] 13:55:53





             Test Item    Value        Reference Range Interpretation Comments

 

             WBC (test code = 6690-2)              See_Comment  H             [A

utomated message]



                                                                 The system [employer]



                                                                 generated this



                                                                 result transmit

huma



                                                                 reference range

:



                                                                 4.20 - 10.70



                                                                 10*3/?L. The



                                                                 reference range

 was



                                                                 not used to



                                                                 interpret this



                                                                 result as



                                                                 normal/abnormal

.

 

             RBC (test code = 789-8)              See_Comment  H             [Au

tomated message]



                                                                 The system [employer]



                                                                 generated this



                                                                 result transmit

huma



                                                                 reference range

:



                                                                 4.26 - 5.52 10*

6/?L.



                                                                 The reference r

zhen



                                                                 was not used to



                                                                 interpret this



                                                                 result as



                                                                 normal/abnormal

.

 

             HGB (test code = 718-7) 16.2 g/dL    12.2-16.4                 

 

             HCT (test code = 4544-3) 48.4 %       38.4-49.3                 

 

             MCH (test code = 785-6) 29.1 pg      26.1-32.7                 

 

             MCV (test code = 787-2) 86.9 fL      81.7-95.6                 

 

             MCHC (test code = 786-4) 33.5 g/dL    31.2-35.0                 

 

             PLT (test code = 777-3)              See_Comment  H             [Au

tomated message]



                                                                 The system [employer]



                                                                 generated this



                                                                 result transmit

huma



                                                                 reference range

: 150



                                                                 - 328 10*3/?L. 

The



                                                                 reference range

 was



                                                                 not used to



                                                                 interpret this



                                                                 result as



                                                                 normal/abnormal

.

 

             MPV (test code = 11.0 fL      9.8-13.0                  



             36280-9)                                            

 

             RDW-CV (test code = 14.2 %       12.1-15.4                 



             788-0)                                              

 

             RDW-SD (test code = 44.8 fL      38.5-51.6                 



             00730-5)                                            

 

             NRBC x10^3 (test code = <0.01        See_Comment                [Au

tomated message]



             4805366180)                                         The system [employer]



                                                                 generated this



                                                                 result transmit

huma



                                                                 reference range

:



                                                                 10*3/?L. The



                                                                 reference range

 was



                                                                 not used to



                                                                 interpret this



                                                                 result as



                                                                 normal/abnormal

.

 

             NRBC/100 WBC (test code              See_Comment                [Au

tomated message]



             = 8337585817)                                        The system [facility]



                                                                 generated this



                                                                 result transmit

huma



                                                                 reference range

: 0.0



                                                                 - 10.0 /100 WBC

s.



                                                                 The reference r

zhen



                                                                 was not used to



                                                                 interpret this



                                                                 result as



                                                                 normal/abnormal

.

 

             IPF % (test code =                                        



             1541328996)                                         

 

             Lab Interpretation (test Abnormal                               



             code = 06444-8)                                        



[facility] GLUCOSE (AUTOMATED)2022 13:34:21





             Test Item    Value        Reference Range Interpretation Comments

 

             POCT GLU (test code = 7993349412) 538 mg/dL           HH     

      

 

             Lab Interpretation (test code = Abnormal                           

    



             18899-1)                                            



[facility] GLUCOSE (AUTOMATED)2022 13:30:43





             Test Item    Value        Reference Range Interpretation Comments

 

             POCT GLU (test code = 2797920829) >600                HH     

      

 

             Lab Interpretation (test code = Abnormal                           

    



             33287-3)                                            



[facility] GLUCOSE (AUTOMATED)2022 13:30:43





             Test Item    Value        Reference Range Interpretation Comments

 

             POCT GLU (test code = 9853939543) >600                HH     

      

 

             Lab Interpretation (test code = Abnormal                           

    



             17301-6)                                            



[facility] GLUCOSE (AUTOMATED)2022 13:30:43





             Test Item    Value        Reference Range Interpretation Comments

 

             POCT GLU (test code = >600                HH           Notifi

ed Provider



             [id number])                                         

 

             Lab Interpretation (test Abnormal                               



             code = 62922-1)                                        



[facility] GLUCOSE (AUTOMATED)2022 13:30:43





             Test Item    Value        Reference Range Interpretation Comments

 

             POCT GLU (test code = 9766148839) >600                HH     

      

 

             Lab Interpretation (test code = Abnormal                           

    



             87746-9)                                            



[facility] GLUCOSE (AUTOMATED)2022 13:30:38





             Test Item    Value        Reference Range Interpretation Comments

 

             POCT GLU (test code = 6784970616) >600                HH     

      

 

             Lab Interpretation (test code = Abnormal                           

    



             49906-2)                                            



[facility] GLUCOSE (AUTOMATED)2022 13:30:38





             Test Item    Value        Reference Range Interpretation Comments

 

             POCT GLU (test code = 1841329742) >600                HH     

      

 

             Lab Interpretation (test code = Abnormal                           

    



             38681-1)                                            



[id number] 12:37:44





             Test Item    Value        Reference Range Interpretation Comments

 

             OSMOLALITY (test code =              See_Comment  HH            [Au

tomated message]



             2692-2)                                             The system [employer]



                                                                 generated this 

result



                                                                 transmitted ref

erence



                                                                 range: 278 - 30

5



                                                                 mOsm/kg. The



                                                                 reference range

 was



                                                                 not used to int

erpret



                                                                 this result as



                                                                 normal/abnormal

.

 

             Lab Interpretation (test Abnormal                               



             code = 94009-4)                                        



[facility] Metabolic Panel (Na, K, Cl, CO2, 
Glucose, BUN, Creatinine, Ca)2022 12:23:35





             Test Item    Value        Reference Range Interpretation Comments

 

             NA (test code = 145 mmol/L   135-145                   



             2828216242)                                         

 

             K (test code = 4.4 mmol/L   3.5-5.0                   



             4995314384)                                         

 

             CL (test code = 109 mmol/L          H            



             4552127834)                                         

 

             CO2 TOTAL (test code = 21 mmol/L    23-31        L            



             0190498737)                                         

 

             AGAP (test code =              2-16                      



             9082923788)                                         

 

             BUN (test code = 15 mg/dL     7-23                      



             3833358057)                                         

 

             GLUCOSE (test code = 753 mg/dL           HH           



             9475045487)                                         

 

             CREATININE (test code = 0.79 mg/dL   0.60-1.25                 



             4785987505)                                         

 

             CALCIUM (test code = 10.9 mg/dL   8.6-10.6     H            



             0385272039)                                         

 

             eGFR (test code =              mL/min/1.73m2              



             3055796754)                                         

 

             MAL (test code = MAL) Association of                           



                          Glomerular Filtration                           



                          Rate (GFR) and Staging                           



                          of Kidney Disease*                           



                          +---------------------                           



                          --+-------------------                           



                          --+-------------------                           



                          ------+| GFR                           



                          (mL/min/1.73 m2) ?|                           



                          With Kidney Damage ?|                           



                          ?Without Kidney                           



                          Damage+---------------                           



                          --------+-------------                           



                          --------+-------------                           



                          ------------+| ?>90 ?                           



                          ? ? ? ? ? ? ? ?|                           



                          ?Stage one ? ? ? ? ?|                           



                          ? Normal ? ? ? ? ? ? ?                           



                          ?+--------------------                           



                          ---+------------------                           



                          ---+------------------                           



                          -------+| ?60-89 ? ? ?                           



                          ? ? ? ? ?| ?Stage two                           



                          ? ? ? ? ?| ? Decreased                           



                          GFR ? ? ? ?                            



                          +---------------------                           



                          --+-------------------                           



                          --+-------------------                           



                          ------+| ?30-59 ? ? ?                           



                          ? ? ? ? ?| ?Stage                           



                          three ? ? ? ?| ? Stage                           



                          three ? ? ? ? ?                           



                          +---------------------                           



                          --+-------------------                           



                          --+-------------------                           



                          ------+| ?15-29 ? ? ?                           



                          ? ? ? ? ?| ?Stage four                           



                          ? ? ? ? | ? Stage four                           



                          ? ? ? ? ?                              



                          ?+--------------------                           



                          ---+------------------                           



                          ---+------------------                           



                          -------+| ?<15 (or                           



                          dialysis) ? ?| ?Stage                           



                          five ? ? ? ? | ? Stage                           



                          five ? ? ? ? ?                           



                          ?+--------------------                           



                          ---+------------------                           



                          ---+------------------                           



                          -------+ *Each stage                           



                          assumes the associated                           



                          GFR level has been in                           



                          effect for at least                           



                          three months. ?Stages                           



                          1 to 5, with or                           



                          without kidney                           



                          disease, indicate                           



                          chronic kidney                           



                          disease. Notes:                           



                          Determination of                           



                          stages one and two                           



                          (with eGFR                             



                          >59mL/min/1.73 m2)                           



                          requires estimation of                           



                          kidney damage for at                           



                          least three months as                           



                          defined by structural                           



                          or functional                           



                          abnormalities of the                           



                          kidney, manifested by                           



                          either:Pathological                           



                          abnormalities or                           



                          Markers of kidney                           



                          damage (including                           



                          abnormalities in the                           



                          composition of the                           



                          blood or urine or                           



                          abnormalities in                           



                          imaging tests).                           

 

             Lab Interpretation Abnormal                               



             (test code = 78237-5)                                        



[facility]56





             Test Item    Value        Reference Range Interpretation Comments

 

             POCT GLU (test code = 1316232890) 564 mg/dL           HH     

      

 

             Lab Interpretation (test code = Abnormal                           

    



             57384-0)                                            



[facility] [id number] 12:05:40





             Test Item    Value        Reference Range Interpretation Comments

 

             MAGNESIUM (test code = 3156531697) 2.5 mg/dL    1.7-2.4      H     

       

 

             Lab Interpretation (test code = Abnormal                           

    



             91333-0)                                            



[facility] 12:05:20





             Test Item    Value        Reference Range Interpretation Comments

 

             MAGNESIUM (test code = 1537954302) 2.5 mg/dL    1.7-2.4      H     

       

 

             Lab Interpretation (test code = Abnormal                           

    



             50028-8)                                            



[facility] [id number] 12:05:20





             Test Item    Value        Reference Range Interpretation Comments

 

             PHOSPHORUS (test code = 9938650046) 6.2 mg/dL    2.5-5.0      H    

        

 

             Lab Interpretation (test code = Abnormal                           

    



             15888-2)                                            



[facility] + LACTIC [id number] 05:53:48





             Test Item    Value        Reference Range Interpretation Comments

 

             PH (test code =              7.32-7.42                 



             6655543297)                                         

 

             PCO2 MARCELINA (test code              See_Comment                [Automa

huma



             = 1580642378)                                        message] The



                                                                 system which



                                                                 generated this



                                                                 result



                                                                 transmitted



                                                                 reference range

:



                                                                 41 - 51 mmHg. T

he



                                                                 reference range



                                                                 was not used to



                                                                 interpret this



                                                                 result as



                                                                 normal/abnormal

.

 

             PO2 MARCELINA (test code =              See_Comment  HH            [Autom

ated



             2181628497)                                         message] The



                                                                 system which



                                                                 generated this



                                                                 result



                                                                 transmitted



                                                                 reference range

:



                                                                 25 - 40 mmHg. T

he



                                                                 reference range



                                                                 was not used to



                                                                 interpret this



                                                                 result as



                                                                 normal/abnormal

.

 

             HCO3 MARCELINA (test code              See_Comment  L             [Automa

huma



             = 8299501558)                                        message] The



                                                                 system which



                                                                 generated this



                                                                 result



                                                                 transmitted



                                                                 reference range

:



                                                                 24 - 28 mEq/L.



                                                                 The reference



                                                                 range was not



                                                                 used to interpr

et



                                                                 this result as



                                                                 normal/abnormal

.

 

             AC VBE(BEAKER) (test              mEq/L                     



             code = 6220101033)                                        

 

             THB MARCELINA (test code = 17.7 g/dL    13.5-18.0                 



             4390402507)                                         

 

             %O2HB MARCELINA (test code 93.8 %       52.0-63.0    H            



             = 9399903051)                                        

 

             %COHB MARCELINA (test code 2.1 %        0.0-1.5      H            



             = 9633825517)                                        

 

             %METHB MARCELINA (test 0.1 %        0.4-1.5      L            



             code = 2088152673)                                        

 

             VOL%O2 MARCELINA (test 23.3 %       6.0-12.0     H            



             code = 2648562873)                                        

 

             NA (test code = 142 mmol/L   135-145                   



             5823582617)                                         

 

             K+ (test code = 4.4 mmol/L   3.5-5.0                   



             3965595385)                                         

 

             AC CA IONZ (test 5.40 mg/dL   4.50-5.30    H            



             code = 1469354577)                                        

 

             GLUCOSE (test code = <20                 LL           



             5496961781)                                         

 

             LACTIC ACID (test 3.37 mmol/L  0.50-2.20    H            



             code = 2267722908)                                        

 

             MAL (test code = *ac gluc                               



             MAL)         unmeasurable                           

 

             Lab Interpretation Abnormal                               



             (test code =                                        



             44662-1)                                            



[facility]. METABOLIC PANEL (30185)2022 
04:59:53





             Test Item    Value        Reference Range Interpretation Comments

 

             NA (test code = 140 mmol/L   135-145                   



             1348638981)                                         

 

             K (test code = 4.8 mmol/L   3.5-5.0                   



             4816674309)                                         

 

             CL (test code = 100 mmol/L                       



             7478935904)                                         

 

             CO2 TOTAL (test code = 20 mmol/L    23-31        L            



             3112482186)                                         

 

             AGAP (test code =              2-16         H            



             3079249025)                                         

 

             BUN (test code = 14 mg/dL     7-23                      



             6483459712)                                         

 

             GLUCOSE (test code = 1083 mg/dL          HH           



             3476984372)                                         

 

             CREATININE (test code = 0.80 mg/dL   0.60-1.25                 



             7757346286)                                         

 

             TOTAL BILI (test code = 1.0 mg/dL    0.1-1.1                   



             5700707379)                                         

 

             CALCIUM (test code = 12.0 mg/dL   8.6-10.6     H            



             0247016158)                                         

 

             T PROTEIN (test code = 8.1 g/dL     6.3-8.2                   



             5307234339)                                         

 

             ALBUMIN (test code = 4.7 g/dL     3.5-5.0                   



             4273158668)                                         

 

             ALK PHOS (test code = 173 U/L             H            



             6905755549)                                         

 

             ALTv (test code = 36 U/L       5-50                      



             1742-6)                                             

 

             AST(SGOT) (test code = 18 U/L       13-40                     



             4461562934)                                         

 

             eGFR (test code =              mL/min/1.73m2              



             8025144084)                                         

 

             MAL (test code = MAL) Association of                           



                          Glomerular Filtration                           



                          Rate (GFR) and Staging                           



                          of Kidney Disease*                           



                          +---------------------                           



                          --+-------------------                           



                          --+-------------------                           



                          ------+| GFR                           



                          (mL/min/1.73 m2) ?|                           



                          With Kidney Damage ?|                           



                          ?Without Kidney                           



                          Damage+---------------                           



                          --------+-------------                           



                          --------+-------------                           



                          ------------+| ?>90 ?                           



                          ? ? ? ? ? ? ? ?|                           



                          ?Stage one ? ? ? ? ?|                           



                          ? Normal ? ? ? ? ? ? ?                           



                          ?+--------------------                           



                          ---+------------------                           



                          ---+------------------                           



                          -------+| ?60-89 ? ? ?                           



                          ? ? ? ? ?| ?Stage two                           



                          ? ? ? ? ?| ? Decreased                           



                          GFR ? ? ? ?                            



                          +---------------------                           



                          --+-------------------                           



                          --+-------------------                           



                          ------+| ?30-59 ? ? ?                           



                          ? ? ? ? ?| ?Stage                           



                          three ? ? ? ?| ? Stage                           



                          three ? ? ? ? ?                           



                          +---------------------                           



                          --+-------------------                           



                          --+-------------------                           



                          ------+| ?15-29 ? ? ?                           



                          ? ? ? ? ?| ?Stage four                           



                          ? ? ? ? | ? Stage four                           



                          ? ? ? ? ?                              



                          ?+--------------------                           



                          ---+------------------                           



                          ---+------------------                           



                          -------+| ?<15 (or                           



                          dialysis) ? ?| ?Stage                           



                          five ? ? ? ? | ? Stage                           



                          five ? ? ? ? ?                           



                          ?+--------------------                           



                          ---+------------------                           



                          ---+------------------                           



                          -------+ *Each stage                           



                          assumes the associated                           



                          GFR level has been in                           



                          effect for at least                           



                          three months. ?Stages                           



                          1 to 5, with or                           



                          without kidney                           



                          disease, indicate                           



                          chronic kidney                           



                          disease. Notes:                           



                          Determination of                           



                          stages one and two                           



                          (with eGFR                             



                          >59mL/min/1.73 m2)                           



                          requires estimation of                           



                          kidney damage for at                           



                          least three months as                           



                          defined by structural                           



                          or functional                           



                          abnormalities of the                           



                          kidney, manifested by                           



                          either:Pathological                           



                          abnormalities or                           



                          Markers of kidney                           



                          damage (including                           



                          abnormalities in the                           



                          composition of the                           



                          blood or urine or                           



                          abnormalities in                           



                          imaging tests).                           

 

             Lab Interpretation Abnormal                               



             (test code = 38699-8)                                        



[facility]43





             Test Item    Value        Reference    Interpretation Comments



                                       Range                     

 

             TROPONIN I (test <0.012       See_Comment                [Automated



             code = 3568791047)                                        message] 

The



                                                                 system which



                                                                 generated this



                                                                 result



                                                                 transmitted



                                                                 reference range

:



                                                                 <=0.034 ng/mL.



                                                                 The reference



                                                                 range was not



                                                                 used to



                                                                 interpret this



                                                                 result as



                                                                 normal/abnormal

.

 

             MAL (test code = Reference (Normal)                           



             MAL)         Range (defined by                           



                          the 99th percentile                           



                          reference limit): <=                           



                          0.034 ng/mL Note:                           



                          Cardiac troponin                           



                          begins to rise 3-4                           



                          hours after the                           



                          onset of ischemia.                           



                          Repeat in 4-6 hours                           



                          if the sample was                           



                          drawn within 3-4                           



                          hours of the onset                           



                          of the symptom and                           



                          found normal.                           



                          Diagnosis of                           



                          myocardial injury is                           



                          made with acute                           



                          changes in cTn                           



                          concentrations with                           



                          at least one serial                           



                          sample above the                           



                          99th percentile                           



                          upper reference                           



                          limit (URL), taken                           



                          together with the                           



                          patient's clinical                           



                          presentation.                           



                          Biotin has been                           



                          reported to cause a                           



                          negative bias,                           



                          interpret results                           



                          relative to                            



                          patient's use of                           



                          biotin.                                

 

             Lab Interpretation Normal                                 



             (test code =                                        



             76164-1)                                            



[id number] 03:48:23





             Test Item    Value        Reference Range Interpretation Comments

 

             NT-proBNP (test code 71 pg/mL     See_Comment                [Autom

ated



             = 2547088917)                                        message] The



                                                                 system which



                                                                 generated this



                                                                 result



                                                                 transmitted



                                                                 reference range

:



                                                                 <=125. The



                                                                 reference range



                                                                 was not used to



                                                                 interpret this



                                                                 result as



                                                                 normal/abnormal

.

 

             MAL (test code = MAL) Biotin has been                           



                          reported to                            



                          cause a negative                           



                          bias, interpret                           



                          results relative                           



                          to patient's use                           



                          of biotin.                             

 

             Lab Interpretation Normal                                 



             (test code = 58768-1)                                        



[id number] 03:48:23





             Test Item    Value        Reference Range Interpretation Comments

 

             NT-proBNP (test code 71 pg/mL     See_Comment                [Autom

ated



             = 1924905507)                                        message] The



                                                                 system which



                                                                 generated this



                                                                 result



                                                                 transmitted



                                                                 reference range

:



                                                                 <=125. The



                                                                 reference range



                                                                 was not used to



                                                                 interpret this



                                                                 result as



                                                                 normal/abnormal

.

 

             MAL (test code = MAL) Biotin has been                           



                          reported to                            



                          cause a negative                           



                          bias, interpret                           



                          results relative                           



                          to patient's use                           



                          of biotin.                             

 

             Lab Interpretation Normal                                 



             (test code = 97679-0)                                        



[facility]





             Test Item    Value        Reference Range Interpretation Comments

 

             LIPASE (test code = 9914780468) 120 U/L      0-220                 

    

 

             Lab Interpretation (test code = Normal                             

    



             78390-8)                                            



[facility] [id number] 
03:38:23





             Test Item    Value        Reference Range Interpretation Comments

 

             APTT Patient (test              See_Comment                [Automat

ed



             code = 3173-2)                                        message] The



                                                                 system which



                                                                 generated this



                                                                 result



                                                                 transmitted



                                                                 reference range

:



                                                                 23 - 38 Seconds

.



                                                                 The reference



                                                                 range was not



                                                                 used to interpr

et



                                                                 this result as



                                                                 normal/abnormal

.

 

             MAL (test code = MAL) The [facility] patient                           



                          population mean                           



                          normal value for                           



                          aPTT is 30                             



                          seconds.                               

 

             Lab Interpretation Normal                                 



             (test code = 69766-4)                                        



[facility] [id number] 
03:38:23





             Test Item    Value        Reference Range Interpretation Comments

 

             APTT Patient (test              See_Comment                [Automat

ed



             code = 3173-2)                                        message] The



                                                                 system which



                                                                 generated this



                                                                 result



                                                                 transmitted



                                                                 reference range

:



                                                                 23 - 38 Seconds

.



                                                                 The reference



                                                                 range was not



                                                                 used to interpr

et



                                                                 this result as



                                                                 normal/abnormal

.

 

             MAL (test code = MAL) The [facility] patient                           



                          population mean                           



                          normal value for                           



                          aPTT is 30                             



                          seconds.                               

 

             Lab Interpretation Normal                                 



             (test code = 66898-0)                                        



[facility] / [id number] 03:36:22





             Test Item    Value        Reference Range Interpretation Comments

 

             PROTIME PATIENT (test              See_Comment                [Auto

mated message]



             code = 5964-2)                                        The system wh

ich



                                                                 generated this 

result



                                                                 transmitted ref

erence



                                                                 range: 12.0 - 1

4.7



                                                                 Seconds. The re

ference



                                                                 range was not u

sed to



                                                                 interpret this 

result



                                                                 as normal/abnor

mal.

 

             INR (test code = 6301-6)                                        Nor

mal INR <1.1;



                                                                 Warfarin Therap

eutic



                                                                 range 2.0 to 3.

0 or



                                                                 2.5 to 3.5, dep

ending



                                                                 upon the indica

tions.

 

             Lab Interpretation (test Normal                                 



             code = 84552-8)                                        



[facility] [id number] 03:35:42





             Test Item    Value        Reference Range Interpretation Comments

 

             WBC (test code =              See_Comment  H             [Automated



             5690-2)                                             message] The



                                                                 system which



                                                                 generated this



                                                                 result transmit

huma



                                                                 reference range

:



                                                                 4.20 - 10.70



                                                                 10*3/?L. The



                                                                 reference range



                                                                 was not used to



                                                                 interpret this



                                                                 result as



                                                                 normal/abnormal

.

 

             RBC (test code =              See_Comment  H             [Automated



             789-8)                                              message] The



                                                                 system which



                                                                 generated this



                                                                 result transmit

huma



                                                                 reference range

:



                                                                 4.26 - 5.52



                                                                 10*6/?L. The



                                                                 reference range



                                                                 was not used to



                                                                 interpret this



                                                                 result as



                                                                 normal/abnormal

.

 

             HGB (test code = 17.8 g/dL    12.2-16.4    H            



             718-7)                                              

 

             HCT (test code = 51.7 %       38.4-49.3    H            



             4544-3)                                             

 

             MCV (test code = 86.9 fL      81.7-95.6                 



             787-2)                                              

 

             MCH (test code = 29.9 pg      26.1-32.7                 



             785-6)                                              

 

             MCHC (test code = 34.4 g/dL    31.2-35.0                 



             786-4)                                              

 

             RDW-SD (test code = 44.7 fL      38.5-51.6                 



             53836-6)                                            

 

             RDW-CV (test code = 14.2 %       12.1-15.4                 



             788-0)                                              

 

             PLT (test code =              See_Comment  H             [Automated



             777-3)                                              message] The



                                                                 system which



                                                                 generated this



                                                                 result transmit

huma



                                                                 reference range

:



                                                                 150 - 328 10*3/

?L.



                                                                 The reference



                                                                 range was not u

sed



                                                                 to interpret th

is



                                                                 result as



                                                                 normal/abnormal

.

 

             MPV (test code = 11.5 fL      9.8-13.0                  



             56273-7)                                            

 

             NRBC/100 WBC (test              See_Comment                [Automat

ed



             code = 1959965868)                                        message] 

The



                                                                 system which



                                                                 generated this



                                                                 result transmit

huma



                                                                 reference range

:



                                                                 0.0 - 10.0 /100



                                                                 WBCs. The



                                                                 reference range



                                                                 was not used to



                                                                 interpret this



                                                                 result as



                                                                 normal/abnormal

.

 

             NRBC x10^3 (test code <0.01        See_Comment                [Auto

mated



             = [id number])                                        message] The



                                                                 system which



                                                                 generated this



                                                                 result transmit

huma



                                                                 reference range

:



                                                                 10*3/?L. The



                                                                 reference range



                                                                 was not used to



                                                                 interpret this



                                                                 result as



                                                                 normal/abnormal

.

 

             GRAN MAT (NEUT) % 85.2 %                                 



             (test code = 770-8)                                        

 

             IMM GRAN % (test code 0.90 %                                 



             = 0785934535)                                        

 

             LYMPH % (test code = 5.9 %                                  



             736-9)                                              

 

             MONO % (test code = 7.8 %                                  



             5905-5)                                             

 

             EOS % (test code = 0.0 %                                  



             713-8)                                              

 

             BASO % (test code = 0.2 %                                  



             706-2)                                              

 

             GRAN MAT x10^3(ANC) 12.85 10*3/uL 1.99-6.95    H            



             (test code =                                        



             8012527339)                                         

 

             IMM GRAN x10^3 (test 0.13 10*3/uL 0.00-0.06    H            



             code = 2641986430)                                        

 

             LYMPH x10^3 (test code 0.89 10*3/uL 1.09-3.23    L            



             = 731-0)                                            

 

             MONO x10^3 (test code 1.17 10*3/uL 0.36-1.02    H            



             = 742-7)                                            

 

             EOS x10^3 (test code = <0.03        0.06-0.53    L            



             711-2)                                              

 

             BASO x10^3 (test code 0.03 10*3/uL 0.01-0.09                 



             = 704-7)                                            

 

             Lab Interpretation Abnormal                               



             (test code = 95420-7)                                        



[facility] Hospital[facility] Whole [id number] 03:22:23





             Test Item    Value        Reference Range Interpretation Comments

 

             LACTIC ACID (test code = 4.52 mmol/L  0.50-2.20    H            



             5536257593)                                         

 

             Lab Interpretation (test code = Abnormal                           

    



             22724-7)                                            



[facility] METABOLIC PANEL (NA, K, CL, CO2, 
GLUCOSE, BUN, CREATININE, CA)2022 11:13:09





             Test Item    Value        Reference Range Interpretation Comments

 

             NA (test code = 137 mmol/L   135-145                   



             2379113267)                                         

 

             K (test code = 4.8 mmol/L   3.5-5.0                   



             0216956994)                                         

 

             CL (test code = 101 mmol/L                       



             6023282958)                                         

 

             CO2 TOTAL (test code = 24 mmol/L    23-31                     



             8426034240)                                         

 

             AGAP (test code =              2-16                      



             6932937861)                                         

 

             BUN (test code = 17 mg/dL     7-23                      



             3596115193)                                         

 

             GLUCOSE (test code = 323 mg/dL           H            



             6668124332)                                         

 

             CREATININE (test code = 0.56 mg/dL   0.60-1.25    L            



             4861205085)                                         

 

             CALCIUM (test code = 9.6 mg/dL    8.6-10.6                  



             8895795106)                                         

 

             eGFR (test code =              mL/min/1.73m2              



             8885096138)                                         

 

             MAL (test code = MAL) Association of                           



                          Glomerular Filtration                           



                          Rate (GFR) and Staging                           



                          of Kidney Disease*                           



                          +---------------------                           



                          --+-------------------                           



                          --+-------------------                           



                          ------+| GFR                           



                          (mL/min/1.73 m2) ?|                           



                          With Kidney Damage ?|                           



                          ?Without Kidney                           



                          Damage+---------------                           



                          --------+-------------                           



                          --------+-------------                           



                          ------------+| ?>90 ?                           



                          ? ? ? ? ? ? ? ?|                           



                          ?Stage one ? ? ? ? ?|                           



                          ? Normal ? ? ? ? ? ? ?                           



                          ?+--------------------                           



                          ---+------------------                           



                          ---+------------------                           



                          -------+| ?60-89 ? ? ?                           



                          ? ? ? ? ?| ?Stage two                           



                          ? ? ? ? ?| ? Decreased                           



                          GFR ? ? ? ?                            



                          +---------------------                           



                          --+-------------------                           



                          --+-------------------                           



                          ------+| ?30-59 ? ? ?                           



                          ? ? ? ? ?| ?Stage                           



                          three ? ? ? ?| ? Stage                           



                          three ? ? ? ? ?                           



                          +---------------------                           



                          --+-------------------                           



                          --+-------------------                           



                          ------+| ?15-29 ? ? ?                           



                          ? ? ? ? ?| ?Stage four                           



                          ? ? ? ? | ? Stage four                           



                          ? ? ? ? ?                              



                          ?+--------------------                           



                          ---+------------------                           



                          ---+------------------                           



                          -------+| ?<15 (or                           



                          dialysis) ? ?| ?Stage                           



                          five ? ? ? ? | ? Stage                           



                          five ? ? ? ? ?                           



                          ?+--------------------                           



                          ---+------------------                           



                          ---+------------------                           



                          -------+ *Each stage                           



                          assumes the associated                           



                          GFR level has been in                           



                          effect for at least                           



                          three months. ?Stages                           



                          1 to 5, with or                           



                          without kidney                           



                          disease, indicate                           



                          chronic kidney                           



                          disease. Notes:                           



                          Determination of                           



                          stages one and two                           



                          (with eGFR                             



                          >59mL/min/1.73 m2)                           



                          requires estimation of                           



                          kidney damage for at                           



                          least three months as                           



                          defined by structural                           



                          or functional                           



                          abnormalities of the                           



                          kidney, manifested by                           



                          either:Pathological                           



                          abnormalities or                           



                          Markers of kidney                           



                          damage (including                           



                          abnormalities in the                           



                          composition of the                           



                          blood or urine or                           



                          abnormalities in                           



                          imaging tests).                           

 

             Lab Interpretation Abnormal                               



             (test code = 08660-8)                                        



[facility] WITH [id number] 10:49:07





             Test Item    Value        Reference Range Interpretation Comments

 

             WBC (test code =              See_Comment  H             [Automated



             7133-2)                                             message] The sy

stem



                                                                 which generated



                                                                 this result



                                                                 transmitted



                                                                 reference range

:



                                                                 4.20 - 10.70



                                                                 10*3/?L. The



                                                                 reference range

 was



                                                                 not used to



                                                                 interpret this



                                                                 result as



                                                                 normal/abnormal

.

 

             RBC (test code =              See_Comment                [Automated



             598-8)                                              message] The sy

stem



                                                                 which generated



                                                                 this result



                                                                 transmitted



                                                                 reference range

:



                                                                 4.26 - 5.52



                                                                 10*6/?L. The



                                                                 reference range

 was



                                                                 not used to



                                                                 interpret this



                                                                 result as



                                                                 normal/abnormal

.

 

             HGB (test code = 15.6 g/dL    12.2-16.4                 



             718-7)                                              

 

             HCT (test code = 46.6 %       38.4-49.3                 



             4544-3)                                             

 

             MCV (test code = 88.3 fL      81.7-95.6                 



             787-2)                                              

 

             MCH (test code = 29.5 pg      26.1-32.7                 



             785-6)                                              

 

             MCHC (test code = 33.5 g/dL    31.2-35.0                 



             786-4)                                              

 

             RDW-SD (test code = 46.7 fL      38.5-51.6                 



             06839-9)                                            

 

             RDW-CV (test code = 14.5 %       12.1-15.4                 



             788-0)                                              

 

             PLT (test code =              See_Comment  H             [Automated



             777-3)                                              message] The sy

stem



                                                                 which generated



                                                                 this result



                                                                 transmitted



                                                                 reference range

:



                                                                 150 - 328 10*3/

?L.



                                                                 The reference r

zhen



                                                                 was not used to



                                                                 interpret this



                                                                 result as



                                                                 normal/abnormal

.

 

             MPV (test code = 10.2 fL      9.8-13.0                  



             36722-6)                                            

 

             NRBC/100 WBC (test              See_Comment                [Automat

ed



             code = 2259115295)                                        message] 

The system



                                                                 which generated



                                                                 this result



                                                                 transmitted



                                                                 reference range

:



                                                                 0.0 - 10.0 /100



                                                                 WBCs. The refer

ence



                                                                 range was not u

sed



                                                                 to interpret th

is



                                                                 result as



                                                                 normal/abnormal

.

 

             NRBC x10^3 (test code <0.01        See_Comment                [Auto

mated



             = [id number])                                        message] The s

ystem



                                                                 which generated



                                                                 this result



                                                                 transmitted



                                                                 reference range

:



                                                                 10*3/?L. The



                                                                 reference range

 was



                                                                 not used to



                                                                 interpret this



                                                                 result as



                                                                 normal/abnormal

.

 

             GRAN MAT (NEUT) % 70.2 %                                 



             (test code = 770-8)                                        

 

             IMM GRAN % (test code 0.80 %                                 



             = 3691321552)                                        

 

             LYMPH % (test code = 18.0 %                                 



             736-9)                                              

 

             MONO % (test code = 8.5 %                                  



             5905-5)                                             

 

             EOS % (test code = 2.1 %                                  



             713-8)                                              

 

             BASO % (test code = 0.4 %                                  



             706-2)                                              

 

             GRAN MAT x10^3(ANC) 7.63 10*3/uL 1.99-6.95    H            



             (test code =                                        



             0008236746)                                         

 

             IMM GRAN x10^3 (test 0.09 10*3/uL 0.00-0.06    H            



             code = 2059199511)                                        

 

             LYMPH x10^3 (test code 1.96 10*3/uL 1.09-3.23                 



             = 731-0)                                            

 

             MONO x10^3 (test code 0.92 10*3/uL 0.36-1.02                 



             = 742-7)                                            

 

             EOS x10^3 (test code = 0.23 10*3/uL 0.06-0.53                 



             711-2)                                              

 

             BASO x10^3 (test code 0.04 10*3/uL 0.01-0.09                 



             = 704-7)                                            

 

             Lab Interpretation Abnormal                               



             (test code = 49892-3)                                        



[facility] METABOLIC PANEL (NA, K, CL, CO2, 
GLUCOSE, BUN, CREATININE, CA)2022 09:48:58





             Test Item    Value        Reference Range Interpretation Comments

 

             NA (test code = 135 mmol/L   135-145                   



             4594653217)                                         

 

             K (test code = 4.7 mmol/L   3.5-5.0                   



             1886563549)                                         

 

             CL (test code = 101 mmol/L                       



             8956255161)                                         

 

             CO2 TOTAL (test code = 23 mmol/L    23-31                     



             6618326365)                                         

 

             AGAP (test code =              2-16                      



             4899546546)                                         

 

             BUN (test code = 18 mg/dL     7-23                      



             0922851606)                                         

 

             GLUCOSE (test code = 346 mg/dL           H            



             5796447697)                                         

 

             CREATININE (test code = 0.64 mg/dL   0.60-1.25                 



             8797000541)                                         

 

             CALCIUM (test code = 9.1 mg/dL    8.6-10.6                  



             8321373101)                                         

 

             eGFR (test code =              mL/min/1.73m2              



             5209339782)                                         

 

             MAL (test code = MAL) Association of                           



                          Glomerular Filtration                           



                          Rate (GFR) and Staging                           



                          of Kidney Disease*                           



                          +---------------------                           



                          --+-------------------                           



                          --+-------------------                           



                          ------+| GFR                           



                          (mL/min/1.73 m2) ?|                           



                          With Kidney Damage ?|                           



                          ?Without Kidney                           



                          Damage+---------------                           



                          --------+-------------                           



                          --------+-------------                           



                          ------------+| ?>90 ?                           



                          ? ? ? ? ? ? ? ?|                           



                          ?Stage one ? ? ? ? ?|                           



                          ? Normal ? ? ? ? ? ? ?                           



                          ?+--------------------                           



                          ---+------------------                           



                          ---+------------------                           



                          -------+| ?60-89 ? ? ?                           



                          ? ? ? ? ?| ?Stage two                           



                          ? ? ? ? ?| ? Decreased                           



                          GFR ? ? ? ?                            



                          +---------------------                           



                          --+-------------------                           



                          --+-------------------                           



                          ------+| ?30-59 ? ? ?                           



                          ? ? ? ? ?| ?Stage                           



                          three ? ? ? ?| ? Stage                           



                          three ? ? ? ? ?                           



                          +---------------------                           



                          --+-------------------                           



                          --+-------------------                           



                          ------+| ?15-29 ? ? ?                           



                          ? ? ? ? ?| ?Stage four                           



                          ? ? ? ? | ? Stage four                           



                          ? ? ? ? ?                              



                          ?+--------------------                           



                          ---+------------------                           



                          ---+------------------                           



                          -------+| ?<15 (or                           



                          dialysis) ? ?| ?Stage                           



                          five ? ? ? ? | ? Stage                           



                          five ? ? ? ? ?                           



                          ?+--------------------                           



                          ---+------------------                           



                          ---+------------------                           



                          -------+ *Each stage                           



                          assumes the associated                           



                          GFR level has been in                           



                          effect for at least                           



                          three months. ?Stages                           



                          1 to 5, with or                           



                          without kidney                           



                          disease, indicate                           



                          chronic kidney                           



                          disease. Notes:                           



                          Determination of                           



                          stages one and two                           



                          (with eGFR                             



                          >59mL/min/1.73 m2)                           



                          requires estimation of                           



                          kidney damage for at                           



                          least three months as                           



                          defined by structural                           



                          or functional                           



                          abnormalities of the                           



                          kidney, manifested by                           



                          either:Pathological                           



                          abnormalities or                           



                          Markers of kidney                           



                          damage (including                           



                          abnormalities in the                           



                          composition of the                           



                          blood or urine or                           



                          abnormalities in                           



                          imaging tests).                           

 

             Lab Interpretation Abnormal                               



             (test code = 93142-6)                                        



[facility] WITH [id number] 09:06:55





             Test Item    Value        Reference Range Interpretation Comments

 

             WBC (test code =              See_Comment                [Automated



             1939-2)                                             message] The sy

stem



                                                                 which generated



                                                                 this result



                                                                 transmitted



                                                                 reference range

:



                                                                 4.20 - 10.70



                                                                 10*3/?L. The



                                                                 reference range

 was



                                                                 not used to



                                                                 interpret this



                                                                 result as



                                                                 normal/abnormal

.

 

             RBC (test code =              See_Comment                [Automated



             471-8)                                              message] The sy

stem



                                                                 which generated



                                                                 this result



                                                                 transmitted



                                                                 reference range

:



                                                                 4.26 - 5.52



                                                                 10*6/?L. The



                                                                 reference range

 was



                                                                 not used to



                                                                 interpret this



                                                                 result as



                                                                 normal/abnormal

.

 

             HGB (test code = 15.0 g/dL    12.2-16.4                 



             718-7)                                              

 

             HCT (test code = 44.8 %       38.4-49.3                 



             4544-3)                                             

 

             MCV (test code = 88.2 fL      81.7-95.6                 



             787-2)                                              

 

             MCH (test code = 29.5 pg      26.1-32.7                 



             785-6)                                              

 

             MCHC (test code = 33.5 g/dL    31.2-35.0                 



             786-4)                                              

 

             RDW-SD (test code = 47.7 fL      38.5-51.6                 



             22450-2)                                            

 

             RDW-CV (test code = 14.6 %       12.1-15.4                 



             788-0)                                              

 

             PLT (test code =              See_Comment                [Automated



             777-3)                                              message] The sy

stem



                                                                 which generated



                                                                 this result



                                                                 transmitted



                                                                 reference range

:



                                                                 150 - 328 10*3/

?L.



                                                                 The reference r

zhen



                                                                 was not used to



                                                                 interpret this



                                                                 result as



                                                                 normal/abnormal

.

 

             MPV (test code = 9.9 fL       9.8-13.0                  



             17863-4)                                            

 

             NRBC/100 WBC (test              See_Comment                [Automat

ed



             code = 3179455166)                                        message] 

The system



                                                                 which generated



                                                                 this result



                                                                 transmitted



                                                                 reference range

:



                                                                 0.0 - 10.0 /100



                                                                 WBCs. The refer

ence



                                                                 range was not u

sed



                                                                 to interpret th

is



                                                                 result as



                                                                 normal/abnormal

.

 

             NRBC x10^3 (test code <0.01        See_Comment                [Auto

mated



             = 4669967078)                                        message] The s

ystem



                                                                 which generated



                                                                 this result



                                                                 transmitted



                                                                 reference range

:



                                                                 10*3/?L. The



                                                                 reference range

 was



                                                                 not used to



                                                                 interpret this



                                                                 result as



                                                                 normal/abnormal

.

 

             GRAN MAT (NEUT) % 62.6 %                                 



             (test code = 770-8)                                        

 

             IMM GRAN % (test code 1.30 %                                 



             = 3186992438)                                        

 

             LYMPH % (test code = 23.2 %                                 



             736-9)                                              

 

             MONO % (test code = 9.6 %                                  



             5905-5)                                             

 

             EOS % (test code = 2.9 %                                  



             713-8)                                              

 

             BASO % (test code = 0.4 %                                  



             706-2)                                              

 

             GRAN MAT x10^3(ANC) 5.85 10*3/uL 1.99-6.95                 



             (test code =                                        



             6342187166)                                         

 

             IMM GRAN x10^3 (test 0.12 10*3/uL 0.00-0.06    H            



             code = 5888587208)                                        

 

             LYMPH x10^3 (test code 2.17 10*3/uL 1.09-3.23                 



             = 731-0)                                            

 

             MONO x10^3 (test code 0.90 10*3/uL 0.36-1.02                 



             = 742-7)                                            

 

             EOS x10^3 (test code = 0.27 10*3/uL 0.06-0.53                 



             711-2)                                              

 

             BASO x10^3 (test code 0.04 10*3/uL 0.01-0.09                 



             = 704-7)                                            

 

             Lab Interpretation Abnormal                               



             (test code = 44876-4)                                        



[id number]01:46





             Test Item    Value        Reference Range Interpretation Comments

 

             Blood Culture-Aerobic No organisms No growth                 Previo

us



             (test code = 17928-3) isolated                               prelim

inary



                                                                 verified result



                                                                 was Culture In



                                                                 Progress on



                                                                 2022 at 00

01



                                                                 CDTPrevious



                                                                 preliminary



                                                                 verified result



                                                                 was No growth a

t



                                                                 24 hours on



                                                                 2022 at 21

01



                                                                 CDTPrevious



                                                                 preliminary



                                                                 verified result



                                                                 was No growth a

t



                                                                 48 hours on



                                                                 2022 at 21





                                                                 CDTPrevious



                                                                 preliminary



                                                                 verified result



                                                                 was No growth a

t



                                                                 72 hours on



                                                                 2022 at 21

01



                                                                 CDT

 

             Blood        No organisms No growth                 Previous



             Culture-Anaerobic isolated                               preliminar

y



             (test code = 94364-4)                                        verifi

ed result



                                                                 was Culture In



                                                                 Progress on



                                                                 2022 at 00

01



                                                                 CDTPrevious



                                                                 preliminary



                                                                 verified result



                                                                 was No growth a

t



                                                                 24 hours on



                                                                 2022 at 21

01



                                                                 CDTPrevious



                                                                 preliminary



                                                                 verified result



                                                                 was No growth a

t



                                                                 48 hours on



                                                                 2022 at 21





                                                                 CDTPrevious



                                                                 preliminary



                                                                 verified result



                                                                 was No growth a

t



                                                                 72 hours on



                                                                 2022 at 21

01



                                                                 CDT

 

             Lab Interpretation Normal                                 



             (test code = 11851-1)                                        



[facility] [id number] 02:01:46





             Test Item    Value        Reference Range Interpretation Comments

 

             Blood Culture-Aerobic No organisms No growth                 Previo

us



             (test code = 17928-3) isolated                               prelim

inary



                                                                 verified result



                                                                 was Culture In



                                                                 Progress on



                                                                 2022 at 00

01



                                                                 CDTPrevious



                                                                 preliminary



                                                                 verified result



                                                                 was No growth a

t



                                                                 24 hours on



                                                                 2022 at 21

01



                                                                 CDTPrevious



                                                                 preliminary



                                                                 verified result



                                                                 was No growth a

t



                                                                 48 hours on



                                                                 2022 at 21





                                                                 CDTPrevious



                                                                 preliminary



                                                                 verified result



                                                                 was No growth a

t



                                                                 72 hours on



                                                                 2022 at 21

01



                                                                 CDT

 

             Blood        No organisms No growth                 Previous



             Culture-Anaerobic isolated                               preliminar

y



             (test code = 13844-8)                                        verifi

ed result



                                                                 was Culture In



                                                                 Progress on



                                                                 2022 at 00

01



                                                                 CDTPrevious



                                                                 preliminary



                                                                 verified result



                                                                 was No growth a

t



                                                                 24 hours on



                                                                 2022 at 21

01



                                                                 CDTPrevious



                                                                 preliminary



                                                                 verified result



                                                                 was No growth a

t



                                                                 48 hours on



                                                                 2022 at 21

01



                                                                 CDTPrevious



                                                                 preliminary



                                                                 verified result



                                                                 was No growth a

t



                                                                 72 hours on



                                                                 2022 at 21

01



                                                                 CDT

 

             Lab Interpretation Normal                                 



             (test code = 97690-3)                                        



[id number] 12:18:49





             Test Item    Value        Reference Range Interpretation Comments

 

             NT-proBNP (test code 117 pg/mL    See_Comment                [Autom

ated



             = [id number])                                        message] The



                                                                 system which



                                                                 generated this



                                                                 result



                                                                 transmitted



                                                                 reference range

:



                                                                 <=125. The



                                                                 reference range



                                                                 was not used to



                                                                 interpret this



                                                                 result as



                                                                 normal/abnormal

.

 

             MAL (test code = MAL) Biotin has been                           



                          reported to                            



                          cause a negative                           



                          bias, interpret                           



                          results relative                           



                          to patient's use                           



                          of biotin.                             

 

             Lab Interpretation Normal                                 



             (test code = 95727-7)                                        



[facility] ([id number][facility]2022 
12:10:25





             Test Item    Value        Reference Range Interpretation Comments

 

             NA (test code = 137 mmol/L   135-145                   



             1325107629)                                         

 

             K (test code = 4.6 mmol/L   3.5-5.0                   



             6014425976)                                         

 

             CL (test code = 108 mmol/L                       



             2845084469)                                         

 

             CO2 TOTAL (test code = 20 mmol/L    23-31        L            



             8037096597)                                         

 

             AGAP (test code =              2-16                      



             1615547422)                                         

 

             BUN (test code = 12 mg/dL     7-23                      



             7192989152)                                         

 

             GLUCOSE (test code = 186 mg/dL           H            



             0293367681)                                         

 

             CREATININE (test code = 0.49 mg/dL   0.60-1.25    L            



             3273683146)                                         

 

             TOTAL BILI (test code = 0.7 mg/dL    0.1-1.1                   



             9346183019)                                         

 

             CALCIUM (test code = 8.7 mg/dL    8.6-10.6                  



             5041350927)                                         

 

             T PROTEIN (test code = 6.9 g/dL     6.3-8.2                   



             2291000330)                                         

 

             ALBUMIN (test code = 3.7 g/dL     3.5-5.0                   



             2113705050)                                         

 

             ALK PHOS (test code = 114 U/L                          



             7256723527)                                         

 

             ALTv (test code = 75 U/L       5-50         H            



             1742-6)                                             

 

             AST(SGOT) (test code = 27 U/L       13-40                     



             2751631434)                                         

 

             eGFR (test code =              mL/min/1.73m2              



             4374930154)                                         

 

             MAL (test code = MAL) Association of                           



                          Glomerular Filtration                           



                          Rate (GFR) and Staging                           



                          of Kidney Disease*                           



                          +---------------------                           



                          --+-------------------                           



                          --+-------------------                           



                          ------+| GFR                           



                          (mL/min/1.73 m2) ?|                           



                          With Kidney Damage ?|                           



                          ?Without Kidney                           



                          Damage+---------------                           



                          --------+-------------                           



                          --------+-------------                           



                          ------------+| ?>90 ?                           



                          ? ? ? ? ? ? ? ?|                           



                          ?Stage one ? ? ? ? ?|                           



                          ? Normal ? ? ? ? ? ? ?                           



                          ?+--------------------                           



                          ---+------------------                           



                          ---+------------------                           



                          -------+| ?60-89 ? ? ?                           



                          ? ? ? ? ?| ?Stage two                           



                          ? ? ? ? ?| ? Decreased                           



                          GFR ? ? ? ?                            



                          +---------------------                           



                          --+-------------------                           



                          --+-------------------                           



                          ------+| ?30-59 ? ? ?                           



                          ? ? ? ? ?| ?Stage                           



                          three ? ? ? ?| ? Stage                           



                          three ? ? ? ? ?                           



                          +---------------------                           



                          --+-------------------                           



                          --+-------------------                           



                          ------+| ?15-29 ? ? ?                           



                          ? ? ? ? ?| ?Stage four                           



                          ? ? ? ? | ? Stage four                           



                          ? ? ? ? ?                              



                          ?+--------------------                           



                          ---+------------------                           



                          ---+------------------                           



                          -------+| ?<15 (or                           



                          dialysis) ? ?| ?Stage                           



                          five ? ? ? ? | ? Stage                           



                          five ? ? ? ? ?                           



                          ?+--------------------                           



                          ---+------------------                           



                          ---+------------------                           



                          -------+ *Each stage                           



                          assumes the associated                           



                          GFR level has been in                           



                          effect for at least                           



                          three months. ?Stages                           



                          1 to 5, with or                           



                          without kidney                           



                          disease, indicate                           



                          chronic kidney                           



                          disease. Notes:                           



                          Determination of                           



                          stages one and two                           



                          (with eGFR                             



                          >59mL/min/1.73 m2)                           



                          requires estimation of                           



                          kidney damage for at                           



                          least three months as                           



                          defined by structural                           



                          or functional                           



                          abnormalities of the                           



                          kidney, manifested by                           



                          either:Pathological                           



                          abnormalities or                           



                          Markers of kidney                           



                          damage (including                           



                          abnormalities in the                           



                          composition of the                           



                          blood or urine or                           



                          abnormalities in                           



                          imaging tests).                           

 

             Lab Interpretation Abnormal                               



             (test code = 06795-3)                                        



[facility] WITH [id number] 11:14:25





             Test Item    Value        Reference Range Interpretation Comments

 

             WBC (test code =              See_Comment                [Automated



             1791-2)                                             message] The sy

stem



                                                                 which generated



                                                                 this result



                                                                 transmitted



                                                                 reference range

:



                                                                 4.20 - 10.70



                                                                 10*3/?L. The



                                                                 reference range

 was



                                                                 not used to



                                                                 interpret this



                                                                 result as



                                                                 normal/abnormal

.

 

             RBC (test code =              See_Comment                [Automated



             821-7)                                              message] The sy

stem



                                                                 which generated



                                                                 this result



                                                                 transmitted



                                                                 reference range

:



                                                                 4.26 - 5.52



                                                                 10*6/?L. The



                                                                 reference range

 was



                                                                 not used to



                                                                 interpret this



                                                                 result as



                                                                 normal/abnormal

.

 

             HGB (test code = 14.8 g/dL    12.2-16.4                 



             718-7)                                              

 

             HCT (test code = 44.8 %       38.4-49.3                 



             4544-3)                                             

 

             MCV (test code = 89.1 fL      81.7-95.6                 



             787-2)                                              

 

             MCH (test code = 29.4 pg      26.1-32.7                 



             785-6)                                              

 

             MCHC (test code = 33.0 g/dL    31.2-35.0                 



             786-4)                                              

 

             RDW-SD (test code = 48.7 fL      38.5-51.6                 



             45769-1)                                            

 

             RDW-CV (test code = 15.0 %       12.1-15.4                 



             788-0)                                              

 

             PLT (test code =              See_Comment                [Automated



             777-3)                                              message] The sy

stem



                                                                 which generated



                                                                 this result



                                                                 transmitted



                                                                 reference range

:



                                                                 150 - 328 10*3/

?L.



                                                                 The reference r

zhen



                                                                 was not used to



                                                                 interpret this



                                                                 result as



                                                                 normal/abnormal

.

 

             MPV (test code = 10.4 fL      9.8-13.0                  



             99683-4)                                            

 

             NRBC/100 WBC (test              See_Comment                [Automat

ed



             code = 1149624577)                                        message] 

The system



                                                                 which generated



                                                                 this result



                                                                 transmitted



                                                                 reference range

:



                                                                 0.0 - 10.0 /100



                                                                 WBCs. The refer

ence



                                                                 range was not u

sed



                                                                 to interpret th

is



                                                                 result as



                                                                 normal/abnormal

.

 

             NRBC x10^3 (test code <0.01        See_Comment                [Auto

mated



             = [id number])                                        message] The s

ystem



                                                                 which generated



                                                                 this result



                                                                 transmitted



                                                                 reference range

:



                                                                 10*3/?L. The



                                                                 reference range

 was



                                                                 not used to



                                                                 interpret this



                                                                 result as



                                                                 normal/abnormal

.

 

             GRAN MAT (NEUT) % 65.5 %                                 



             (test code = 770-8)                                        

 

             IMM GRAN % (test code 0.80 %                                 



             = 1236851036)                                        

 

             LYMPH % (test code = 20.9 %                                 



             736-9)                                              

 

             MONO % (test code = 9.7 %                                  



             5905-5)                                             

 

             EOS % (test code = 2.7 %                                  



             713-8)                                              

 

             BASO % (test code = 0.4 %                                  



             706-2)                                              

 

             GRAN MAT x10^3(ANC) 5.41 10*3/uL 1.99-6.95                 



             (test code =                                        



             4523332876)                                         

 

             IMM GRAN x10^3 (test 0.07 10*3/uL 0.00-0.06    H            



             code = 0414509237)                                        

 

             LYMPH x10^3 (test code 1.73 10*3/uL 1.09-3.23                 



             = 731-0)                                            

 

             MONO x10^3 (test code 0.80 10*3/uL 0.36-1.02                 



             = 742-7)                                            

 

             EOS x10^3 (test code = 0.22 10*3/uL 0.06-0.53                 



             711-2)                                              

 

             BASO x10^3 (test code 0.03 10*3/uL 0.01-0.09                 



             = 704-7)                                            

 

             Lab Interpretation Abnormal                               



             (test code = 35278-1)                                        



[id number] 13:16:44





             Test Item    Value        Reference Range Interpretation Comments

 

             NT-proBNP (test code 157 pg/mL    See_Comment  H             [Autom

ated



             = [id number])                                        message] The



                                                                 system which



                                                                 generated this



                                                                 result



                                                                 transmitted



                                                                 reference range

:



                                                                 <=125. The



                                                                 reference range



                                                                 was not used to



                                                                 interpret this



                                                                 result as



                                                                 normal/abnormal

.

 

             MAL (test code = MAL) Biotin has been                           



                          reported to                            



                          cause a negative                           



                          bias, interpret                           



                          results relative                           



                          to patient's use                           



                          of biotin.                             

 

             Lab Interpretation Abnormal                               



             (test code = 48797-4)                                        



[id number][facility] 
13:08:45





             Test Item    Value        Reference Range Interpretation Comments

 

             NA (test code = 141 mmol/L   135-145                   



             4364043886)                                         

 

             K (test code = 4.2 mmol/L   3.5-5.0                   



             1687720040)                                         

 

             CL (test code = 110 mmol/L          H            



             7806297252)                                         

 

             CO2 TOTAL (test code = 24 mmol/L    23-31                     



             1381291283)                                         

 

             AGAP (test code =              2-16                      



             2566844641)                                         

 

             BUN (test code = 11 mg/dL     7-23                      



             6355528224)                                         

 

             GLUCOSE (test code = 142 mg/dL           H            



             1325181047)                                         

 

             CREATININE (test code = 0.61 mg/dL   0.60-1.25                 



             0594977464)                                         

 

             TOTAL BILI (test code = 0.7 mg/dL    0.1-1.1                   



             5391300972)                                         

 

             CALCIUM (test code = 8.5 mg/dL    8.6-10.6     L            



             8767650887)                                         

 

             T PROTEIN (test code = 6.7 g/dL     6.3-8.2                   



             2021098394)                                         

 

             ALBUMIN (test code = 3.6 g/dL     3.5-5.0                   



             5547766594)                                         

 

             ALK PHOS (test code = 120 U/L                          



             7607000273)                                         

 

             ALTv (test code = 88 U/L       5-50         H            



             1742-6)                                             

 

             AST(SGOT) (test code = 27 U/L       13-40                     



             8583888047)                                         

 

             eGFR (test code =              mL/min/1.73m2              



             7341840833)                                         

 

             MAL (test code = MAL) Association of                           



                          Glomerular Filtration                           



                          Rate (GFR) and Staging                           



                          of Kidney Disease*                           



                          +---------------------                           



                          --+-------------------                           



                          --+-------------------                           



                          ------+| GFR                           



                          (mL/min/1.73 m2) ?|                           



                          With Kidney Damage ?|                           



                          ?Without Kidney                           



                          Damage+---------------                           



                          --------+-------------                           



                          --------+-------------                           



                          ------------+| ?>90 ?                           



                          ? ? ? ? ? ? ? ?|                           



                          ?Stage one ? ? ? ? ?|                           



                          ? Normal ? ? ? ? ? ? ?                           



                          ?+--------------------                           



                          ---+------------------                           



                          ---+------------------                           



                          -------+| ?60-89 ? ? ?                           



                          ? ? ? ? ?| ?Stage two                           



                          ? ? ? ? ?| ? Decreased                           



                          GFR ? ? ? ?                            



                          +---------------------                           



                          --+-------------------                           



                          --+-------------------                           



                          ------+| ?30-59 ? ? ?                           



                          ? ? ? ? ?| ?Stage                           



                          three ? ? ? ?| ? Stage                           



                          three ? ? ? ? ?                           



                          +---------------------                           



                          --+-------------------                           



                          --+-------------------                           



                          ------+| ?15-29 ? ? ?                           



                          ? ? ? ? ?| ?Stage four                           



                          ? ? ? ? | ? Stage four                           



                          ? ? ? ? ?                              



                          ?+--------------------                           



                          ---+------------------                           



                          ---+------------------                           



                          -------+| ?<15 (or                           



                          dialysis) ? ?| ?Stage                           



                          five ? ? ? ? | ? Stage                           



                          five ? ? ? ? ?                           



                          ?+--------------------                           



                          ---+------------------                           



                          ---+------------------                           



                          -------+ *Each stage                           



                          assumes the associated                           



                          GFR level has been in                           



                          effect for at least                           



                          three months. ?Stages                           



                          1 to 5, with or                           



                          without kidney                           



                          disease, indicate                           



                          chronic kidney                           



                          disease. Notes:                           



                          Determination of                           



                          stages one and two                           



                          (with eGFR                             



                          >59mL/min/1.73 m2)                           



                          requires estimation of                           



                          kidney damage for at                           



                          least three months as                           



                          defined by structural                           



                          or functional                           



                          abnormalities of the                           



                          kidney, manifested by                           



                          either:Pathological                           



                          abnormalities or                           



                          Markers of kidney                           



                          damage (including                           



                          abnormalities in the                           



                          composition of the                           



                          blood or urine or                           



                          abnormalities in                           



                          imaging tests).                           

 

             Lab Interpretation Abnormal                               



             (test code = 90576-5)                                        



[facility] 13[facility]08:45





             Test Item    Value        Reference Range Interpretation Comments

 

             MAGNESIUM (test code = 6092046139) 1.7 mg/dL    1.7-2.4            

       

 

             Lab Interpretation (test code = Normal                             

    



             89562-5)                                            



[facility] [id number] 11:57:56





             Test Item    Value        Reference Range Interpretation Comments

 

             WBC (test code =              See_Comment                [Automated



             6690-2)                                             message] The sy

stem



                                                                 which generated



                                                                 this result



                                                                 transmitted



                                                                 reference range

:



                                                                 4.20 - 10.70



                                                                 10*3/?L. The



                                                                 reference range

 was



                                                                 not used to



                                                                 interpret this



                                                                 result as



                                                                 normal/abnormal

.

 

             RBC (test code =              See_Comment                [Automated



             789-8)                                              message] The sy

stem



                                                                 which generated



                                                                 this result



                                                                 transmitted



                                                                 reference range

:



                                                                 4.26 - 5.52



                                                                 10*6/?L. The



                                                                 reference range

 was



                                                                 not used to



                                                                 interpret this



                                                                 result as



                                                                 normal/abnormal

.

 

             HGB (test code = 14.6 g/dL    12.2-16.4                 



             718-7)                                              

 

             HCT (test code = 43.5 %       38.4-49.3                 



             4544-3)                                             

 

             MCV (test code = 88.4 fL      81.7-95.6                 



             787-2)                                              

 

             MCH (test code = 29.7 pg      26.1-32.7                 



             785-6)                                              

 

             MCHC (test code = 33.6 g/dL    31.2-35.0                 



             786-4)                                              

 

             RDW-SD (test code = 48.9 fL      38.5-51.6                 



             81739-1)                                            

 

             RDW-CV (test code = 14.9 %       12.1-15.4                 



             788-0)                                              

 

             PLT (test code =              See_Comment                [Automated



             777-3)                                              message] The sy

stem



                                                                 which generated



                                                                 this result



                                                                 transmitted



                                                                 reference range

:



                                                                 150 - 328 10*3/

?L.



                                                                 The reference r

zhen



                                                                 was not used to



                                                                 interpret this



                                                                 result as



                                                                 normal/abnormal

.

 

             MPV (test code = 9.4 fL       9.8-13.0     L            



             95702-0)                                            

 

             NRBC/100 WBC (test              See_Comment                [Automat

ed



             code = 3426157414)                                        message] 

The system



                                                                 which generated



                                                                 this result



                                                                 transmitted



                                                                 reference range

:



                                                                 0.0 - 10.0 /100



                                                                 WBCs. The refer

ence



                                                                 range was not u

sed



                                                                 to interpret th

is



                                                                 result as



                                                                 normal/abnormal

.

 

             NRBC x10^3 (test code <0.01        See_Comment                [Auto

mated



             = 0298976247)                                        message] The s

ystem



                                                                 which generated



                                                                 this result



                                                                 transmitted



                                                                 reference range

:



                                                                 10*3/?L. The



                                                                 reference range

 was



                                                                 not used to



                                                                 interpret this



                                                                 result as



                                                                 normal/abnormal

.

 

             GRAN MAT (NEUT) % 63.9 %                                 



             (test code = 770-8)                                        

 

             IMM GRAN % (test code 0.70 %                                 



             = 7661568584)                                        

 

             LYMPH % (test code = 22.7 %                                 



             736-9)                                              

 

             MONO % (test code = 9.6 %                                  



             5905-5)                                             

 

             EOS % (test code = 2.7 %                                  



             713-8)                                              

 

             BASO % (test code = 0.4 %                                  



             706-2)                                              

 

             GRAN MAT x10^3(ANC) 4.75 10*3/uL 1.99-6.95                 



             (test code =                                        



             8938229914)                                         

 

             IMM GRAN x10^3 (test 0.05 10*3/uL 0.00-0.06                 



             code = 5281378351)                                        

 

             LYMPH x10^3 (test code 1.69 10*3/uL 1.09-3.23                 



             = 731-0)                                            

 

             MONO x10^3 (test code 0.71 10*3/uL 0.36-1.02                 



             = 742-7)                                            

 

             EOS x10^3 (test code = 0.20 10*3/uL 0.06-0.53                 



             711-2)                                              

 

             BASO x10^3 (test code 0.03 10*3/uL 0.01-0.09                 



             = 704-7)                                            

 

             Lab Interpretation Abnormal                               



             (test code = 41464-3)                                        



[facility] [id number] 19:18:39





             Test Item    Value        Reference Range Interpretation Comments

 

             VIT B12 (test code = 249 pg/mL    240-930                   



             2193723534)                                         

 

             MAL (test code = MAL) Biotin has been                           



                          reported to cause a                           



                          positive bias,                           



                          interpret results                           



                          relative to                            



                          patient's use of                           



                          biotin.                                

 

             Lab Interpretation (test Normal                                 



             code = 79274-2)                                        



[facility] [id number] 19:18:39





             Test Item    Value        Reference Range Interpretation Comments

 

             VIT B12 (test code = 249 pg/mL    240-930                   



             9029638174)                                         

 

             MAL (test code = MAL) Biotin has been                           



                          reported to cause a                           



                          positive bias,                           



                          interpret results                           



                          relative to                            



                          patient's use of                           



                          biotin.                                

 

             Lab Interpretation (test Normal                                 



             code = 87065-5)                                        



[facility], [id number] 17:30:28





             Test Item    Value        Reference Range Interpretation Comments

 

             VIT D 25OH (test code = 16 ng/mL     25-80        L            



             00668-1)                                            

 

             MAL (test code = MAL) Deficiency: <20                           



                          ng/mLInsufficiency:                           



                          20-24 ng/mLOptimal:                           



                          25-80 ng/mL                            

 

             Lab Interpretation (test Abnormal                               



             code = 50381-7)                                        



[id number]28





             Test Item    Value        Reference Range Interpretation Comments

 

             VIT D 25OH (test code = 16 ng/mL     25-80        L            



             42665-5)                                            

 

             MAL (test code = MAL) Deficiency: <20                           



                          ng/mLInsufficiency:                           



                          20-24 ng/mLOptimal:                           



                          25-80 ng/mL                            

 

             Lab Interpretation (test Abnormal                               



             code = 00814-6)                                        



[facility]32





             Test Item    Value        Reference    Interpretation Comments



                                       Range                     

 

             Procalcitonin (test 0.04 ng/mL   See_Comment                [[employer]oma

huma



             code = [id number])                                        message] 

The



                                                                 system which



                                                                 generated this



                                                                 result



                                                                 transmitted



                                                                 reference



                                                                 range: <=0.07.



                                                                 The reference



                                                                 range was not



                                                                 used to



                                                                 interpret this



                                                                 result as



                                                                 normal/abnormal



                                                                 .

 

             MAL (test code = INTERPRETATION OF                           



             MAL)         PROCALCITONIN RESULTS                           



                          IN ADULTS >= 18 YEARS                           



                          OF AGE Initiation and                           



                          discontinuation of                           



                          antibiotics on                           



                          patients with                           



                          suspected or confirmed                           



                          Lower Respiratory                           



                          Tract Infection in                           



                          Adults >= 18 years of                           



                          age.                                   



                          +--------------+------                           



                          ----------+-----------                           



                          ----+-----------------                           



                          -----------+|Procalcit                           



                          onin |Interpretation                           



                          ?|Antibiotic ? ?                           



                          |Considerations ? ? ?                           



                          ? ? ? ? |ng/mL ? ? ? ?                           



                          | ? ? ? ? ? ? ?                           



                          ?|recommendation | ? ?                           



                          ? ? ? ? ? ? ? ? ? ? ?                           



                          ?                                      



                          +--------------+------                           



                          ----------+-----------                           



                          ----+-----------------                           



                          -----------+| <0.1 ? ?                           



                          ? ? | Bacterial ? ? ?|                           



                          Strongly ? ? ?| ? ? ?                           



                          ? ? ? ? ? ? ? ? ? ? ?                           



                          | ? ? ? ? ? ? ?|                           



                          infection very |                           



                          discouraged ? |                           



                          Overruling: ? ? ? ? ?                           



                          ? ? ? | ? ? ? ? ? ? ?|                           



                          unlikely ? ? ? | ? ? ?                           



                          ? ? ? ? | ? Clinically                           



                          unstable ? ? ?                           



                          +--------------+------                           



                          ----------+-----------                           



                          ----+ ? High risk for                           



                          adverse ? ? | <0.25 ?                           



                          ? ? ?| Bacterial ? ?                           



                          ?| Discouraged ? | ?                           



                          outcome ? ? ? ? ? ? ?                           



                          ? ? | ? ? ? ? ? ? ?|                           



                          infection ? ? ?| ? ? ?                           



                          ? ? ? ? | ? SEE                           



                          IMPORTANT NOTE ? ? ?                           



                          ?| ? ? ? ? ? ? ?|                           



                          unlikely ? ? ? | ? ? ?                           



                          ? ? ? ? | ? ? ? ? ? ?                           



                          ? ? ? ? ? ? ? ?                           



                          +--------------+------                           



                          ----------+-----------                           



                          ----+-----------------                           



                          -----------+| >=0.25 ?                           



                          ? ? | Bacterial ? ? ?|                           



                          Encouraged ? ?| ? ? ?                           



                          ? ? ? ? ? ? ? ? ? ? ?                           



                          | ? ? ? ? ? ? ?|                           



                          infection ? ? ?| ? ? ?                           



                          ? ? ? ? | ? ? ? ? ? ?                           



                          ? ? ? ? ? ? ? ? | ? ?                           



                          ? ? ? ? ?| likely ? ?                           



                          ? ? | ? ? ? ? ? ? ? |                           



                          Consider treatment                           



                          failure                                



                          ?+--------------+-----                           



                          -----------+----------                           



                          -----+ if levels does                           



                          not decrease | >0.5 ?                           



                          ? ? ? | Bacterial ? ?                           



                          ?| Strongly ? ? ?|                           



                          appropriately ? ? ? ?                           



                          ? ? ? | ? ? ? ? ? ? ?|                           



                          infection very |                           



                          encouraged ? ?| ? ? ?                           



                          ? ? ? ? ? ? ? ? ? ? ?                           



                          | ? ? ? ? ? ? ?|                           



                          likely ? ? ? ? | ? ? ?                           



                          ? ? ? ? | ? ? ? ? ? ?                           



                          ? ? ? ? ? ? ? ?                           



                          +--------------+------                           



                          ----------+-----------                           



                          ----+-----------------                           



                          -----------+                           



                          Discontinuation of                           



                          antibiotics in                           



                          high-acuity patients                           



                          with suspected or                           



                          confirmed sepsis in                           



                          Adults >= 18 years of                           



                          age.                                   



                          +--------------+------                           



                          ----------+-----------                           



                          ----+-----------------                           



                          -----------+|Procalcit                           



                          onin |Interpretation                           



                          ?|Antibiotic ? ?                           



                          |Considerations ? ? ?                           



                          ? ? ? ? |ng/mL ? ? ? ?                           



                          | ? ? ? ? ? ? ?                           



                          ?|recommendation | ? ?                           



                          ? ? ? ? ? ? ? ? ? ? ?                           



                          ?                                      



                          +--------------+------                           



                          ----------+-----------                           



                          ----+-----------------                           



                          -----------+| <0.25 ?                           



                          ? ? ?| Bacterial ? ?                           



                          ?| Strongly ? ? ?| ? ?                           



                          ? ? ? ? ? ? ? ? ? ? ?                           



                          ? | ? ? ? ? ? ? ?|                           



                          infection very |                           



                          discouraged ? |                           



                          Overruling: ? ? ? ? ?                           



                          ? ? ? | ? ? ? ? ? ? ?|                           



                          unlikely ? ? ? | ? ? ?                           



                          ? ? ? ? | ? Clinically                           



                          unstable ? ? ?                           



                          +--------------+------                           



                          ----------+-----------                           



                          ----+ ? High risk for                           



                          adverse ? ? | <0.5 or                           



                          drop | Bacterial ? ?                           



                          ?| Discouraged ? | ?                           



                          outcome ? ? ? ? ? ? ?                           



                          ? ? | >80% from ? ?|                           



                          infection ? ? ?| ? ? ?                           



                          ? ? ? ? | ? SEE                           



                          IMPORTANT NOTE ? ? ?                           



                          ?| highest PCT ?|                           



                          unlikely ? ? ? | ? ? ?                           



                          ? ? ? ? | ? ? ? ? ? ?                           



                          ? ? ? ? ? ? ? ? |                           



                          level ? ? ? ?| ? ? ? ?                           



                          ? ? ? ?| ? ? ? ? ? ? ?                           



                          | ? ? ? ? ? ? ? ? ? ?                           



                          ? ? ? ?                                



                          +--------------+------                           



                          ----------+-----------                           



                          ----+-----------------                           



                          -----------+| >=0.5 ?                           



                          ? ? ?| Bacterial ? ?                           



                          ?| Encouraged ? ?| ? ?                           



                          ? ? ? ? ? ? ? ? ? ? ?                           



                          ? | ? ? ? ? ? ? ?|                           



                          infection ? ? ?| ? ? ?                           



                          ? ? ? ? | ? ? ? ? ? ?                           



                          ? ? ? ? ? ? ? ? | ? ?                           



                          ? ? ? ? ?| likely ? ?                           



                          ? ? | ? ? ? ? ? ? ? |                           



                          Consider treatment                           



                          failure                                



                          ?+--------------+-----                           



                          -----------+----------                           



                          -----+ if levels does                           



                          not decrease | >1.0 ?                           



                          ? ? ? | Bacterial ? ?                           



                          ?| Strongly ? ? ?|                           



                          appropriately ? ? ? ?                           



                          ? ? ? | ? ? ? ? ? ? ?|                           



                          infection very |                           



                          encouraged ? ?| ? ? ?                           



                          ? ? ? ? ? ? ? ? ? ? ?                           



                          | ? ? ? ? ? ? ?|                           



                          likely ? ? ? ? | ? ? ?                           



                          ? ? ? ? | ? ? ? ? ? ?                           



                          ? ? ? ? ? ? ? ?                           



                          +--------------+------                           



                          ----------+-----------                           



                          ----+-----------------                           



                          -----------+                           



                          Percentage of drop of                           



                          Procalcitonin                           



                          calculation for                           



                          Discontinuation of                           



                          antibiotics in                           



                          high-acuity patients                           



                          with suspected or                           



                          confirmed sepsis in                           



                          Adults >= 18 years of                           



                          age. ? ? ? ? ? ? ? ? ?                           



                          ? ? Procalcitonin                           



                          highest{}-Procalcitoni                           



                          n current{}Delta                           



                          Procalcitonin =                           



                          ______________________                           



                          ______________________                           



                          ___ x100% ? ? ? ? ? ?                           



                          ? ? ? ? ? ? ? ? ? ?                           



                          Procalcitonin current                           



                          {} IMPORTANT NOTE:                           



                          Procalcitonin may be                           



                          elevated without                           



                          bacterial infection by                           



                          physiologic stress                           



                          related to trauma,                           



                          burns, chronic                           



                          dialysis, metastatic                           



                          cancer, surgery in the                           



                          past seven days,                           



                          malaria, some fungal                           



                          infections, and some                           



                          forms of vasculitis.                           



                          The interpretation                           



                          algorithm may not                           



                          apply to patients with                           



                          immunosuppression                           



                          (equivalent of >10 mg                           



                          of prednisone daily),                           



                          HIV with CD4 cell                           



                          count < 350 cells/mm3,                           



                          active malignancy on                           



                          systemic chemotherapy,                           



                          solid organ transplant                           



                          or hematopoietic stem                           



                          cell transplantation,                           



                          or hospital acquired                           



                          pneumonia.                             



                          Additionally, some                           



                          clinical trials of                           



                          procalcitonin have                           



                          excluded patients with                           



                          shock requiring                           



                          vasopressor use, acute                           



                          respiratory failure                           



                          requiring mechanical                           



                          ventilation, or those                           



                          with known lung                           



                          abscess/empyema. For                           



                          further information                           



                          please refer                           



                          to:[email or web address]                           



                          [email or web address]                           



                          p                                      

 

             Lab Interpretation Normal                                 



             (test code =                                        



             23783-6)                                            



[facility]32





             Test Item    Value        Reference Range Interpretation Comments

 

             Procalcitonin (test 0.04 ng/mL   <0.07                     



             code = [id number])                                        

 

             MAL (test code = MAL) INTERPRETATION OF                           



                          PROCALCITONIN RESULTS IN                           



                          ADULTS >= 18 YEARS OF                           



                          AGE Initiation and                           



                          discontinuation of                           



                          antibiotics on patients                           



                          with suspected or                           



                          confirmed Lower                           



                          Respiratory Tract                           



                          Infection in Adults >=                           



                          18 years of age.                           



                          +--------------+--------                           



                          --------+---------------                           



                          +-----------------------                           



                          -----+|Procalcitonin                           



                          |Interpretation                           



                          ?|Antibiotic ? ?                           



                          |Considerations ? ? ? ?                           



                          ? ? ? |ng/mL ? ? ? ? | ?                           



                          ? ? ? ? ? ?                            



                          ?|recommendation | ? ? ?                           



                          ? ? ? ? ? ? ? ? ? ? ?                           



                          +--------------+--------                           



                          --------+---------------                           



                          +-----------------------                           



                          -----+| <0.1 ? ? ? ? |                           



                          Bacterial ? ? ?|                           



                          Strongly ? ? ?| ? ? ? ?                           



                          ? ? ? ? ? ? ? ? ? ? | ?                           



                          ? ? ? ? ? ?| infection                           



                          very | discouraged ? |                           



                          Overruling: ? ? ? ? ? ?                           



                          ? ? | ? ? ? ? ? ? ?|                           



                          unlikely ? ? ? | ? ? ? ?                           



                          ? ? ? | ? Clinically                           



                          unstable ? ? ?                           



                          +--------------+--------                           



                          --------+---------------                           



                          + ? High risk for                           



                          adverse ? ? | <0.25 ? ?                           



                          ? ?| Bacterial ? ? ?|                           



                          Discouraged ? | ?                           



                          outcome ? ? ? ? ? ? ? ?                           



                          ? | ? ? ? ? ? ? ?|                           



                          infection ? ? ?| ? ? ? ?                           



                          ? ? ? | ? SEE IMPORTANT                           



                          NOTE ? ? ? ?| ? ? ? ? ?                           



                          ? ?| unlikely ? ? ? | ?                           



                          ? ? ? ? ? ? | ? ? ? ? ?                           



                          ? ? ? ? ? ? ? ? ?                           



                          +--------------+--------                           



                          --------+---------------                           



                          +-----------------------                           



                          -----+| >=0.25 ? ? ? |                           



                          Bacterial ? ? ?|                           



                          Encouraged ? ?| ? ? ? ?                           



                          ? ? ? ? ? ? ? ? ? ? | ?                           



                          ? ? ? ? ? ?| infection ?                           



                          ? ?| ? ? ? ? ? ? ? | ? ?                           



                          ? ? ? ? ? ? ? ? ? ? ? ?                           



                          | ? ? ? ? ? ? ?| likely                           



                          ? ? ? ? | ? ? ? ? ? ? ?                           



                          | Consider treatment                           



                          failure                                



                          ?+--------------+-------                           



                          ---------+--------------                           



                          -+ if levels does not                           



                          decrease | >0.5 ? ? ? ?                           



                          | Bacterial ? ? ?|                           



                          Strongly ? ? ?|                           



                          appropriately ? ? ? ? ?                           



                          ? ? | ? ? ? ? ? ? ?|                           



                          infection very |                           



                          encouraged ? ?| ? ? ? ?                           



                          ? ? ? ? ? ? ? ? ? ? | ?                           



                          ? ? ? ? ? ?| likely ? ?                           



                          ? ? | ? ? ? ? ? ? ? | ?                           



                          ? ? ? ? ? ? ? ? ? ? ? ?                           



                          ?                                      



                          +--------------+--------                           



                          --------+---------------                           



                          +-----------------------                           



                          -----+ Discontinuation                           



                          of antibiotics in                           



                          high-acuity patients                           



                          with suspected or                           



                          confirmed sepsis in                           



                          Adults >= 18 years of                           



                          age.                                   



                          +--------------+--------                           



                          --------+---------------                           



                          +-----------------------                           



                          -----+|Procalcitonin                           



                          |Interpretation                           



                          ?|Antibiotic ? ?                           



                          |Considerations ? ? ? ?                           



                          ? ? ? |ng/mL ? ? ? ? | ?                           



                          ? ? ? ? ? ?                            



                          ?|recommendation | ? ? ?                           



                          ? ? ? ? ? ? ? ? ? ? ?                           



                          +--------------+--------                           



                          --------+---------------                           



                          +-----------------------                           



                          -----+| <0.25 ? ? ? ?|                           



                          Bacterial ? ? ?|                           



                          Strongly ? ? ?| ? ? ? ?                           



                          ? ? ? ? ? ? ? ? ? ? | ?                           



                          ? ? ? ? ? ?| infection                           



                          very | discouraged ? |                           



                          Overruling: ? ? ? ? ? ?                           



                          ? ? | ? ? ? ? ? ? ?|                           



                          unlikely ? ? ? | ? ? ? ?                           



                          ? ? ? | ? Clinically                           



                          unstable ? ? ?                           



                          +--------------+--------                           



                          --------+---------------                           



                          + ? High risk for                           



                          adverse ? ? | <0.5 or                           



                          drop | Bacterial ? ? ?|                           



                          Discouraged ? | ?                           



                          outcome ? ? ? ? ? ? ? ?                           



                          ? | >80% from ? ?|                           



                          infection ? ? ?| ? ? ? ?                           



                          ? ? ? | ? SEE IMPORTANT                           



                          NOTE ? ? ? ?| highest                           



                          PCT ?| unlikely ? ? ? |                           



                          ? ? ? ? ? ? ? | ? ? ? ?                           



                          ? ? ? ? ? ? ? ? ? ? |                           



                          level ? ? ? ?| ? ? ? ? ?                           



                          ? ? ?| ? ? ? ? ? ? ? | ?                           



                          ? ? ? ? ? ? ? ? ? ? ? ?                           



                          ?                                      



                          +--------------+--------                           



                          --------+---------------                           



                          +-----------------------                           



                          -----+| >=0.5 ? ? ? ?|                           



                          Bacterial ? ? ?|                           



                          Encouraged ? ?| ? ? ? ?                           



                          ? ? ? ? ? ? ? ? ? ? | ?                           



                          ? ? ? ? ? ?| infection ?                           



                          ? ?| ? ? ? ? ? ? ? | ? ?                           



                          ? ? ? ? ? ? ? ? ? ? ? ?                           



                          | ? ? ? ? ? ? ?| likely                           



                          ? ? ? ? | ? ? ? ? ? ? ?                           



                          | Consider treatment                           



                          failure                                



                          ?+--------------+-------                           



                          ---------+--------------                           



                          -+ if levels does not                           



                          decrease | >1.0 ? ? ? ?                           



                          | Bacterial ? ? ?|                           



                          Strongly ? ? ?|                           



                          appropriately ? ? ? ? ?                           



                          ? ? | ? ? ? ? ? ? ?|                           



                          infection very |                           



                          encouraged ? ?| ? ? ? ?                           



                          ? ? ? ? ? ? ? ? ? ? | ?                           



                          ? ? ? ? ? ?| likely ? ?                           



                          ? ? | ? ? ? ? ? ? ? | ?                           



                          ? ? ? ? ? ? ? ? ? ? ? ?                           



                          ?                                      



                          +--------------+--------                           



                          --------+---------------                           



                          +-----------------------                           



                          -----+ Percentage of                           



                          drop of Procalcitonin                           



                          calculation for                           



                          Discontinuation of                           



                          antibiotics in                           



                          high-acuity patients                           



                          with suspected or                           



                          confirmed sepsis in                           



                          Adults >= 18 years of                           



                          age. ? ? ? ? ? ? ? ? ? ?                           



                          ? Procalcitonin                           



                          highest{}-Procalcitonin                           



                          current{}Delta                           



                          Procalcitonin =                           



                          ________________________                           



                          _______________________                           



                          x100% ? ? ? ? ? ? ? ? ?                           



                          ? ? ? ? ? ? ?                           



                          Procalcitonin current {}                           



                          IMPORTANT NOTE:                           



                          Procalcitonin may be                           



                          elevated without                           



                          bacterial infection by                           



                          physiologic stress                           



                          related to trauma,                           



                          burns, chronic dialysis,                           



                          metastatic cancer,                           



                          surgery in the past                           



                          seven days, malaria,                           



                          some fungal infections,                           



                          and some forms of                           



                          vasculitis. The                           



                          interpretation algorithm                           



                          may not apply to                           



                          patients with                           



                          immunosuppression                           



                          (equivalent of >10 mg of                           



                          prednisone daily), HIV                           



                          with CD4 cell count <                           



                          350 cells/mm3, active                           



                          malignancy on systemic                           



                          chemotherapy, solid                           



                          organ transplant or                           



                          hematopoietic stem cell                           



                          transplantation, or                           



                          hospital acquired                           



                          pneumonia. Additionally,                           



                          some clinical trials of                           



                          procalcitonin have                           



                          excluded patients with                           



                          shock requiring                           



                          vasopressor use, acute                           



                          respiratory failure                           



                          requiring mechanical                           



                          ventilation, or those                           



                          with known lung                           



                          abscess/empyema. For                           



                          further information                           



                          please refer                           



                          to:[email or web address]                           



                          [email or web address]                           

 

             Lab Interpretation Normal                                 



             (test code = 15702-8)                                        



[id number] 13:21:10





             Test Item    Value        Reference Range Interpretation Comments

 

             ESR (test code =              See_Comment  H             [Automated

 message]



             [id number])                                         The system [employer]



                                                                 generated this 

result



                                                                 transmitted ref

erence



                                                                 range: 0 - 10 m

m/HR.



                                                                 The reference r

zhen



                                                                 was not used to



                                                                 interpret this 

result



                                                                 as normal/abnor

mal.

 

             Lab Interpretation (test Abnormal                               



             code = 68422-5)                                        



[facility] 
13:12:27





             Test Item    Value        Reference Range Interpretation Comments

 

             HGB A1C (test code = 6.3 %        4.0-5.7      H            



             4548-4)                                             

 

             MAL (test code = MAL) Reference                              



                          RangesNormal:                           



                          <5.7%Prediabetes:                           



                          5.7 - 6.4%Diabetes:                           



                          > 6.5%                                 

 

             Lab Interpretation (test Abnormal                               



             code = 79709-0)                                        



[id number] 11:53:14





             Test Item    Value        Reference Range Interpretation Comments

 

             NT-proBNP (test code 16 pg/mL     See_Comment                [Autom

ated



             = [id number])                                        message] The



                                                                 system which



                                                                 generated this



                                                                 result



                                                                 transmitted



                                                                 reference range

:



                                                                 <=125. The



                                                                 reference range



                                                                 was not used to



                                                                 interpret this



                                                                 result as



                                                                 normal/abnormal

.

 

             MAL (test code = MAL) Biotin has been                           



                          reported to                            



                          cause a negative                           



                          bias, interpret                           



                          results relative                           



                          to patient's use                           



                          of biotin.                             

 

             Lab Interpretation Normal                                 



             (test code = 15585-1)                                        



[facility] (48740[facility] 
11:44:55





             Test Item    Value        Reference Range Interpretation Comments

 

             NA (test code = 142 mmol/L   135-145                   



             [id number])                                         

 

             K (test code = 3.9 mmol/L   3.5-5.0                   



             7531935244)                                         

 

             CL (test code = 108 mmol/L                       



             2804329438)                                         

 

             CO2 TOTAL (test code = 24 mmol/L    23-31                     



             0794617431)                                         

 

             AGAP (test code =              2-16                      



             0631868532)                                         

 

             BUN (test code = 11 mg/dL     7-23                      



             0892141761)                                         

 

             GLUCOSE (test code = 205 mg/dL           H            



             8584837094)                                         

 

             CREATININE (test code = 0.71 mg/dL   0.60-1.25                 



             9562281219)                                         

 

             TOTAL BILI (test code = 0.7 mg/dL    0.1-1.1                   



             7574564922)                                         

 

             CALCIUM (test code = 8.9 mg/dL    8.6-10.6                  



             2943434882)                                         

 

             T PROTEIN (test code = 7.3 g/dL     6.3-8.2                   



             5194995034)                                         

 

             ALBUMIN (test code = 3.8 g/dL     3.5-5.0                   



             4314116938)                                         

 

             ALK PHOS (test code = 135 U/L             H            



             9427379845)                                         

 

             ALTv (test code = 120 U/L      5-50         H            



             1742-6)                                             

 

             AST(SGOT) (test code = 33 U/L       13-40                     



             4292061621)                                         

 

             eGFR (test code =              mL/min/1.73m2              



             7298870026)                                         

 

             AML (test code = MAL) Association of                           



                          Glomerular Filtration                           



                          Rate (GFR) and Staging                           



                          of Kidney Disease*                           



                          +---------------------                           



                          --+-------------------                           



                          --+-------------------                           



                          ------+| GFR                           



                          (mL/min/1.73 m2) ?|                           



                          With Kidney Damage ?|                           



                          ?Without Kidney                           



                          Damage+---------------                           



                          --------+-------------                           



                          --------+-------------                           



                          ------------+| ?>90 ?                           



                          ? ? ? ? ? ? ? ?|                           



                          ?Stage one ? ? ? ? ?|                           



                          ? Normal ? ? ? ? ? ? ?                           



                          ?+--------------------                           



                          ---+------------------                           



                          ---+------------------                           



                          -------+| ?60-89 ? ? ?                           



                          ? ? ? ? ?| ?Stage two                           



                          ? ? ? ? ?| ? Decreased                           



                          GFR ? ? ? ?                            



                          +---------------------                           



                          --+-------------------                           



                          --+-------------------                           



                          ------+| ?30-59 ? ? ?                           



                          ? ? ? ? ?| ?Stage                           



                          three ? ? ? ?| ? Stage                           



                          three ? ? ? ? ?                           



                          +---------------------                           



                          --+-------------------                           



                          --+-------------------                           



                          ------+| ?15-29 ? ? ?                           



                          ? ? ? ? ?| ?Stage four                           



                          ? ? ? ? | ? Stage four                           



                          ? ? ? ? ?                              



                          ?+--------------------                           



                          ---+------------------                           



                          ---+------------------                           



                          -------+| ?<15 (or                           



                          dialysis) ? ?| ?Stage                           



                          five ? ? ? ? | ? Stage                           



                          five ? ? ? ? ?                           



                          ?+--------------------                           



                          ---+------------------                           



                          ---+------------------                           



                          -------+ *Each stage                           



                          assumes the associated                           



                          GFR level has been in                           



                          effect for at least                           



                          three months. ?Stages                           



                          1 to 5, with or                           



                          without kidney                           



                          disease, indicate                           



                          chronic kidney                           



                          disease. Notes:                           



                          Determination of                           



                          stages one and two                           



                          (with eGFR                             



                          >59mL/min/1.73 m2)                           



                          requires estimation of                           



                          kidney damage for at                           



                          least three months as                           



                          defined by structural                           



                          or functional                           



                          abnormalities of the                           



                          kidney, manifested by                           



                          either:Pathological                           



                          abnormalities or                           



                          Markers of kidney                           



                          damage (including                           



                          abnormalities in the                           



                          composition of the                           



                          blood or urine or                           



                          abnormalities in                           



                          imaging tests).                           

 

             Lab Interpretation Abnormal                               



             (test code = 12154-8)                                        



[facility]





             Test Item    Value        Reference Range Interpretation Comments

 

             MAGNESIUM (test code = 6101718773) 1.6 mg/dL    1.7-2.4      L     

       

 

             Lab Interpretation (test code = Abnormal                           

    



             83107-9)                                            



[facility]35





             Test Item    Value        Reference Range Interpretation Comments

 

             PHOSPHORUS (test code = 8020852495) 4.1 mg/dL    2.5-5.0           

        

 

             Lab Interpretation (test code = Normal                             

    



             19543-1)                                            



[facility] [id number] 11:44:15





             Test Item    Value        Reference Range Interpretation Comments

 

             CK (test code = 5566910348) 50 U/L                           

 

             Lab Interpretation (test code = Normal                             

    



             86741-4)                                            



[facility] [id number] 11:44:15





             Test Item    Value        Reference Range Interpretation Comments

 

             CK (test code = 9772891719) 50 U/L                           

 

             Lab Interpretation (test code = Normal                             

    



             69620-0)                                            



[facility] [id number] 11:28:14





             Test Item    Value        Reference Range Interpretation Comments

 

             WBC (test code =              See_Comment                [Automated



             4390-2)                                             message] The sy

stem



                                                                 which generated



                                                                 this result



                                                                 transmitted



                                                                 reference range

:



                                                                 4.20 - 10.70



                                                                 10*3/?L. The



                                                                 reference range

 was



                                                                 not used to



                                                                 interpret this



                                                                 result as



                                                                 normal/abnormal

.

 

             RBC (test code =              See_Comment                [Automated



             955-8)                                              message] The sy

stem



                                                                 which generated



                                                                 this result



                                                                 transmitted



                                                                 reference range

:



                                                                 4.26 - 5.52



                                                                 10*6/?L. The



                                                                 reference range

 was



                                                                 not used to



                                                                 interpret this



                                                                 result as



                                                                 normal/abnormal

.

 

             HGB (test code = 15.0 g/dL    12.2-16.4                 



             718-7)                                              

 

             HCT (test code = 44.6 %       38.4-49.3                 



             4544-3)                                             

 

             MCV (test code = 87.6 fL      81.7-95.6                 



             787-2)                                              

 

             MCH (test code = 29.5 pg      26.1-32.7                 



             785-6)                                              

 

             MCHC (test code = 33.6 g/dL    31.2-35.0                 



             786-4)                                              

 

             RDW-SD (test code = 48.4 fL      38.5-51.6                 



             64801-9)                                            

 

             RDW-CV (test code = 15.1 %       12.1-15.4                 



             788-0)                                              

 

             PLT (test code =              See_Comment                [Automated



             777-3)                                              message] The sy

stem



                                                                 which generated



                                                                 this result



                                                                 transmitted



                                                                 reference range

:



                                                                 150 - 328 10*3/

?L.



                                                                 The reference r

zhen



                                                                 was not used to



                                                                 interpret this



                                                                 result as



                                                                 normal/abnormal

.

 

             MPV (test code = 10.2 fL      9.8-13.0                  



             49075-9)                                            

 

             NRBC/100 WBC (test              See_Comment                [Automat

ed



             code = 0985586527)                                        message] 

The system



                                                                 which generated



                                                                 this result



                                                                 transmitted



                                                                 reference range

:



                                                                 0.0 - 10.0 /100



                                                                 WBCs. The refer

ence



                                                                 range was not u

sed



                                                                 to interpret th

is



                                                                 result as



                                                                 normal/abnormal

.

 

             NRBC x10^3 (test code <0.01        See_Comment                [Auto

mated



             = 1529652397)                                        message] The s

ystem



                                                                 which generated



                                                                 this result



                                                                 transmitted



                                                                 reference range

:



                                                                 10*3/?L. The



                                                                 reference range

 was



                                                                 not used to



                                                                 interpret this



                                                                 result as



                                                                 normal/abnormal

.

 

             GRAN MAT (NEUT) % 67.8 %                                 



             (test code = 770-8)                                        

 

             IMM GRAN % (test code 0.80 %                                 



             = 4267249290)                                        

 

             LYMPH % (test code = 19.8 %                                 



             736-9)                                              

 

             MONO % (test code = 8.7 %                                  



             5905-5)                                             

 

             EOS % (test code = 2.7 %                                  



             713-8)                                              

 

             BASO % (test code = 0.2 %                                  



             706-2)                                              

 

             GRAN MAT x10^3(ANC) 6.56 10*3/uL 1.99-6.95                 



             (test code =                                        



             1307640730)                                         

 

             IMM GRAN x10^3 (test 0.08 10*3/uL 0.00-0.06    H            



             code = 7612279407)                                        

 

             LYMPH x10^3 (test code 1.91 10*3/uL 1.09-3.23                 



             = 731-0)                                            

 

             MONO x10^3 (test code 0.84 10*3/uL 0.36-1.02                 



             = 742-7)                                            

 

             EOS x10^3 (test code = 0.26 10*3/uL 0.06-0.53                 



             711-2)                                              

 

             BASO x10^3 (test code <0.03        0.01-0.09                 



             = 704-7)                                            

 

             Lab Interpretation Abnormal                               



             (test code = 57321-3)                                        



[facility] [id number] 10:56:10





             Test Item    Value        Reference Range Interpretation Comments

 

             IRON (test code = 4852384394) 46 ug/dL            L          

  

 

             TIBC (test code = 2112758311) 299 ug/dL    250-410                 

  

 

             % FE SAT (test code = 3758295546) 15 %         20-50        L      

      

 

             Lab Interpretation (test code = Abnormal                           

    



             48033-5)                                            



[facility] [id number] 10:56:10





             Test Item    Value        Reference Range Interpretation Comments

 

             IRON (test code = 0328669659) 46 ug/dL            L          

  

 

             TIBC (test code = 4368302742) 299 ug/dL    250-410                 

  

 

             % FE SAT (test code = 7417883439) 15 %         20-50        L      

      

 

             Lab Interpretation (test code = Abnormal                           

    



             78773-3)                                            



[facility] [id number] 10:13:03





             Test Item    Value        Reference Range Interpretation Comments

 

             FERRITIN (test code = 89.2 ng/mL                       



             5897084232)                                         

 

             MAL (test code = MAL) Biotin has been                           



                          reported to cause a                           



                          negative bias,                           



                          interpret results                           



                          relative to                            



                          patient's use of                           



                          biotin.                                

 

             Lab Interpretation (test Normal                                 



             code = 82797-1)                                        



[facility] [id number] 10:13:03





             Test Item    Value        Reference Range Interpretation Comments

 

             FERRITIN (test code = 89.2 ng/mL   18.0-464.0                



             0068331536)                                         

 

             MAL (test code = MAL) Biotin has been                           



                          reported to cause a                           



                          negative bias,                           



                          interpret results                           



                          relative to                            



                          patient's use of                           



                          biotin.                                

 

             Lab Interpretation (test Normal                                 



             code = 05717-8)                                        



[facility] [id number] 10:09:06





             Test Item    Value        Reference Range Interpretation Comments

 

             TSH (test code =              See_Comment                [Automated

 message]



             0856589104)                                         The system [employer]



                                                                 generated this 

result



                                                                 transmitted ref

erence



                                                                 range: 0.45 - 4

.70



                                                                 mIU/L. The refe

rence



                                                                 range was not u

sed to



                                                                 interpret this 

result



                                                                 as normal/abnor

mal.

 

             Lab Interpretation (test Normal                                 



             code = 29902-2)                                        



[facility] [id number] 10:09:06





             Test Item    Value        Reference Range Interpretation Comments

 

             TSH (test code =              See_Comment                [Automated

 message]



             0882158108)                                         The system [employer]



                                                                 generated this 

result



                                                                 transmitted ref

erence



                                                                 range: 0.45 - 4

.70



                                                                 mIU/L. The refe

rence



                                                                 range was not u

sed to



                                                                 interpret this 

result



                                                                 as normal/abnor

mal.

 

             Lab Interpretation (test Normal                                 



             code = 66657-3)                                        



[facility] [id number] 09:47:44





             Test Item    Value        Reference Range Interpretation Comments

 

             NT-proBNP (test code 12 pg/mL     See_Comment                [Autom

ated



             = 7709249837)                                        message] The



                                                                 system which



                                                                 generated this



                                                                 result



                                                                 transmitted



                                                                 reference range

:



                                                                 <=125. The



                                                                 reference range



                                                                 was not used to



                                                                 interpret this



                                                                 result as



                                                                 normal/abnormal

.

 

             MAL (test code = MAL) Biotin has been                           



                          reported to                            



                          cause a negative                           



                          bias, interpret                           



                          results relative                           



                          to patient's use                           



                          of biotin.                             

 

             Lab Interpretation Normal                                 



             (test code = 37548-8)                                        



[facility]85[facility]TOTAL CHOLESTEROL, 
TRIGLYCERIDES, HDL)2022 09:35:58





             Test Item    Value        Reference Range Interpretation Comments

 

             CHOL (test code = 250 mg/dL    120-200      H            



             9665746204)                                         

 

             HDL (test code = 34 mg/dL     See_Comment  L             [Automated

 message]



             9362465687)                                         The system [employer]



                                                                 generated this



                                                                 result transmit

huma



                                                                 reference range

:



                                                                 >=40. The refer

ence



                                                                 range was not u

sed



                                                                 to interpret th

is



                                                                 result as



                                                                 normal/abnormal

.

 

             HDLC RATIO (test code =              See_Comment  H             [Au

tomated message]



             4162920696)                                         The system [employer]



                                                                 generated this



                                                                 result transmit

huma



                                                                 reference range

:



                                                                 <=5.0. The refe

rence



                                                                 range was not u

sed



                                                                 to interpret th

is



                                                                 result as



                                                                 normal/abnormal

.

 

             TRIG (test code = 394 mg/dL           H            



             0108899365)                                         

 

             LDL CHOL (test code = 137 mg/dL    See_Comment                [Auto

mated message]



             83259-5)                                            The system [employer]



                                                                 generated this



                                                                 result transmit

huma



                                                                 reference range

:



                                                                 <=160. The refe

rence



                                                                 range was not u

sed



                                                                 to interpret th

is



                                                                 result as



                                                                 normal/abnormal

.

 

             VLDL (test code = 79 mg/dL     5-60         H            



             6118581672)                                         

 

             Lab Interpretation (test Abnormal                               



             code = 43404-0)                                        



[facility]TOTAL CHOLESTEROL, 
TRIGLYCERIDES, HDL)2022 09:35:58





             Test Item    Value        Reference Range Interpretation Comments

 

             CHOL (test code = 250 mg/dL    120-200      H            



             1753643897)                                         

 

             HDL (test code = 34 mg/dL     >40          L            



             8357298024)                                         

 

             HDLC RATIO (test code =              See_Comment  H             [Au

tomated message]



             2432661326)                                         The system [employer]



                                                                 generated this



                                                                 result transmit

huma



                                                                 reference range

:



                                                                 <=5.0. The refe

rence



                                                                 range was not u

sed



                                                                 to interpret th

is



                                                                 result as



                                                                 normal/abnormal

.

 

             TRIG (test code = 394 mg/dL           H            



             8296982465)                                         

 

             LDL CHOL (test code = 137 mg/dL    See_Comment                [Auto

mated message]



             79052-1)                                            The system [employer]



                                                                 generated this



                                                                 result transmit

huma



                                                                 reference range

:



                                                                 <=160. The refe

rence



                                                                 range was not u

sed



                                                                 to interpret th

is



                                                                 result as



                                                                 normal/abnormal

.

 

             VLDL (test code = 79 mg/dL     5-60         H            



             2399054719)                                         

 

             Lab Interpretation (test Abnormal                               



             code = 24729-7)                                        



[facility]





             Test Item    Value        Reference Range Interpretation Comments

 

             MAGNESIUM (test code = 2156701769) 1.5 mg/dL    1.7-2.4      L     

       

 

             Lab Interpretation (test code = Abnormal                           

    



             71216-5)                                            



[facility]





             Test Item    Value        Reference Range Interpretation Comments

 

             PHOSPHORUS (test code = 9351915736) 3.3 mg/dL    2.5-5.0           

        

 

             Lab Interpretation (test code = Normal                             

    



             98412-0)                                            



[facility] ([id number] 
01:29:09





             Test Item    Value        Reference Range Interpretation Comments

 

             NA (test code = 142 mmol/L   135-145                   



             1647420060)                                         

 

             K (test code = 3.7 mmol/L   3.5-5.0                   



             2306340997)                                         

 

             CL (test code = 103 mmol/L                       



             2422802323)                                         

 

             CO2 TOTAL (test code = 27 mmol/L    23-31                     



             4175595212)                                         

 

             AGAP (test code =              2-16                      



             4642189128)                                         

 

             BUN (test code = 11 mg/dL     7-23                      



             6117617471)                                         

 

             GLUCOSE (test code = 166 mg/dL           H            



             8880938026)                                         

 

             CREATININE (test code = 0.61 mg/dL   0.60-1.25                 



             4438805412)                                         

 

             TOTAL BILI (test code = 0.8 mg/dL    0.1-1.1                   



             4341088627)                                         

 

             CALCIUM (test code = 9.3 mg/dL    8.6-10.6                  



             9567843203)                                         

 

             T PROTEIN (test code = 7.4 g/dL     6.3-8.2                   



             8340612035)                                         

 

             ALBUMIN (test code = 4.0 g/dL     3.5-5.0                   



             0941269237)                                         

 

             ALK PHOS (test code = 164 U/L             H            



             6381735394)                                         

 

             ALTv (test code = 149 U/L      5-50         H            



             1742-6)                                             

 

             AST(SGOT) (test code = 29 U/L       13-40                     



             9420880996)                                         

 

             eGFR (test code =              mL/min/1.73m2              



             2207077676)                                         

 

             MAL (test code = MAL) Association of                           



                          Glomerular Filtration                           



                          Rate (GFR) and Staging                           



                          of Kidney Disease*                           



                          +---------------------                           



                          --+-------------------                           



                          --+-------------------                           



                          ------+| GFR                           



                          (mL/min/1.73 m2) ?|                           



                          With Kidney Damage ?|                           



                          ?Without Kidney                           



                          Damage+---------------                           



                          --------+-------------                           



                          --------+-------------                           



                          ------------+| ?>90 ?                           



                          ? ? ? ? ? ? ? ?|                           



                          ?Stage one ? ? ? ? ?|                           



                          ? Normal ? ? ? ? ? ? ?                           



                          ?+--------------------                           



                          ---+------------------                           



                          ---+------------------                           



                          -------+| ?60-89 ? ? ?                           



                          ? ? ? ? ?| ?Stage two                           



                          ? ? ? ? ?| ? Decreased                           



                          GFR ? ? ? ?                            



                          +---------------------                           



                          --+-------------------                           



                          --+-------------------                           



                          ------+| ?30-59 ? ? ?                           



                          ? ? ? ? ?| ?Stage                           



                          three ? ? ? ?| ? Stage                           



                          three ? ? ? ? ?                           



                          +---------------------                           



                          --+-------------------                           



                          --+-------------------                           



                          ------+| ?15-29 ? ? ?                           



                          ? ? ? ? ?| ?Stage four                           



                          ? ? ? ? | ? Stage four                           



                          ? ? ? ? ?                              



                          ?+--------------------                           



                          ---+------------------                           



                          ---+------------------                           



                          -------+| ?<15 (or                           



                          dialysis) ? ?| ?Stage                           



                          five ? ? ? ? | ? Stage                           



                          five ? ? ? ? ?                           



                          ?+--------------------                           



                          ---+------------------                           



                          ---+------------------                           



                          -------+ *Each stage                           



                          assumes the associated                           



                          GFR level has been in                           



                          effect for at least                           



                          three months. ?Stages                           



                          1 to 5, with or                           



                          without kidney                           



                          disease, indicate                           



                          chronic kidney                           



                          disease. Notes:                           



                          Determination of                           



                          stages one and two                           



                          (with eGFR                             



                          >59mL/min/1.73 m2)                           



                          requires estimation of                           



                          kidney damage for at                           



                          least three months as                           



                          defined by structural                           



                          or functional                           



                          abnormalities of the                           



                          kidney, manifested by                           



                          either:Pathological                           



                          abnormalities or                           



                          Markers of kidney                           



                          damage (including                           



                          abnormalities in the                           



                          composition of the                           



                          blood or urine or                           



                          abnormalities in                           



                          imaging tests).                           

 

             Lab Interpretation Abnormal                               



             (test code = 35379-6)                                        



[facility] [id number] 
01:23:46





             Test Item    Value        Reference Range Interpretation Comments

 

             APTT Patient (test              See_Comment                [Automat

ed



             code = 3173-2)                                        message] The



                                                                 system which



                                                                 generated this



                                                                 result



                                                                 transmitted



                                                                 reference range

:



                                                                 23 - 38 Seconds

.



                                                                 The reference



                                                                 range was not



                                                                 used to interpr

et



                                                                 this result as



                                                                 normal/abnormal

.

 

             MAL (test code = MAL) The [facility] patient                           



                          population mean                           



                          normal value for                           



                          aPTT is 30                             



                          seconds.                               

 

             Lab Interpretation Normal                                 



             (test code = 84012-1)                                        



[facility] / [id number] 01:21:51





             Test Item    Value        Reference Range Interpretation Comments

 

             PROTIME PATIENT (test              See_Comment                [Auto

mated message]



             code = 5964-2)                                        The system wh

ich



                                                                 generated this 

result



                                                                 transmitted ref

erence



                                                                 range: 12.0 - 1

4.7



                                                                 Seconds. The re

ference



                                                                 range was not u

sed to



                                                                 interpret this 

result



                                                                 as normal/abnor

mal.

 

             INR (test code = 6301-6)                                        Nor

mal INR <1.1;



                                                                 Warfarin Therap

eutic



                                                                 range 2.0 to 3.

0 or



                                                                 2.5 to 3.5, dep

ending



                                                                 upon the indica

tions.

 

             Lab Interpretation (test Normal                                 



             code = 33391-6)                                        



[facility] [id number] 01:15:28





             Test Item    Value        Reference Range Interpretation Comments

 

             WBC (test code =              See_Comment  H             [Automated



             5190-2)                                             message] The sy

stem



                                                                 which generated



                                                                 this result



                                                                 transmitted



                                                                 reference range

:



                                                                 4.20 - 10.70



                                                                 10*3/?L. The



                                                                 reference range

 was



                                                                 not used to



                                                                 interpret this



                                                                 result as



                                                                 normal/abnormal

.

 

             RBC (test code =              See_Comment  H             [Automated



             689-8)                                              message] The sy

stem



                                                                 which generated



                                                                 this result



                                                                 transmitted



                                                                 reference range

:



                                                                 4.26 - 5.52



                                                                 10*6/?L. The



                                                                 reference range

 was



                                                                 not used to



                                                                 interpret this



                                                                 result as



                                                                 normal/abnormal

.

 

             HGB (test code = 16.5 g/dL    12.2-16.4    H            



             718-7)                                              

 

             HCT (test code = 49.1 %       38.4-49.3                 



             4544-3)                                             

 

             MCV (test code = 87.7 fL      81.7-95.6                 



             787-2)                                              

 

             MCH (test code = 29.5 pg      26.1-32.7                 



             785-6)                                              

 

             MCHC (test code = 33.6 g/dL    31.2-35.0                 



             786-4)                                              

 

             RDW-SD (test code = 48.4 fL      38.5-51.6                 



             90759-1)                                            

 

             RDW-CV (test code = 15.1 %       12.1-15.4                 



             788-0)                                              

 

             PLT (test code =              See_Comment                [Automated



             777-3)                                              message] The sy

stem



                                                                 which generated



                                                                 this result



                                                                 transmitted



                                                                 reference range

:



                                                                 150 - 328 10*3/

?L.



                                                                 The reference r

zhen



                                                                 was not used to



                                                                 interpret this



                                                                 result as



                                                                 normal/abnormal

.

 

             MPV (test code = 10.1 fL      9.8-13.0                  



             06681-7)                                            

 

             NRBC/100 WBC (test              See_Comment                [Automat

ed



             code = 1093194435)                                        message] 

The system



                                                                 which generated



                                                                 this result



                                                                 transmitted



                                                                 reference range

:



                                                                 0.0 - 10.0 /100



                                                                 WBCs. The refer

ence



                                                                 range was not u

sed



                                                                 to interpret th

is



                                                                 result as



                                                                 normal/abnormal

.

 

             NRBC x10^3 (test code <0.01        See_Comment                [Auto

mated



             = 6117212917)                                        message] The s

ystem



                                                                 which generated



                                                                 this result



                                                                 transmitted



                                                                 reference range

:



                                                                 10*3/?L. The



                                                                 reference range

 was



                                                                 not used to



                                                                 interpret this



                                                                 result as



                                                                 normal/abnormal

.

 

             GRAN MAT (NEUT) % 72.7 %                                 



             (test code = 770-8)                                        

 

             IMM GRAN % (test code 0.60 %                                 



             = 1954860433)                                        

 

             LYMPH % (test code = 15.5 %                                 



             736-9)                                              

 

             MONO % (test code = 8.2 %                                  



             5905-5)                                             

 

             EOS % (test code = 2.6 %                                  



             713-8)                                              

 

             BASO % (test code = 0.4 %                                  



             706-2)                                              

 

             GRAN MAT x10^3(ANC) 7.83 10*3/uL 1.99-6.95    H            



             (test code =                                        



             8803950158)                                         

 

             IMM GRAN x10^3 (test 0.07 10*3/uL 0.00-0.06    H            



             code = 0837956145)                                        

 

             LYMPH x10^3 (test code 1.67 10*3/uL 1.09-3.23                 



             = 731-0)                                            

 

             MONO x10^3 (test code 0.88 10*3/uL 0.36-1.02                 



             = 742-7)                                            

 

             EOS x10^3 (test code = 0.28 10*3/uL 0.06-0.53                 



             711-2)                                              

 

             BASO x10^3 (test code 0.04 10*3/uL 0.01-0.09                 



             = 704-7)                                            

 

             Lab Interpretation Abnormal                               



             (test code = 12364-8)                                        



[facility] Hospital[facility] Whole [id number] 01:14:32





             Test Item    Value        Reference Range Interpretation Comments

 

             LACTIC ACID (test code = 1.86 mmol/L  0.50-2.20                 



             7658695955)                                         

 

             Lab Interpretation (test code = Normal                             

    



             84211-0)                                            



[facility] [id number] 12:58:00





             Test Item    Value        Reference Range Interpretation Comments

 

             Glucose Lvl (test code = Glucose Lvl) 228          70-99           

          



[facility]2022-04-12 12:58:00





             Test Item    Value        Reference Range Interpretation Comments

 

             BUN (test code = BUN) 23           7-                      



[facility]2022-04-12 12:58:00





             Test Item    Value        Reference Range Interpretation Comments

 

             Creatinine Lvl (test code = Creatinine 0.78         0.50-1.40      

           



             Lvl)                                                



[facility]2022-04-12 12:58:00





             Test Item    Value        Reference Range Interpretation Comments

 

             Sodium Lvl (test code = Sodium Lvl) 138          135-145           

        



[facility]2022-04-12 12:58:00





             Test Item    Value        Reference Range Interpretation Comments

 

             Potassium Lvl (test code = Potassium 4.6          3.5-5.1          

         



             Lvl)                                                



[facility]2022-04-12 12:58:00





             Test Item    Value        Reference Range Interpretation Comments

 

             Chloride Lvl (test code = Chloride Lvl) 108                  

            



[facility]2022-04-12 12:58:00





             Test Item    Value        Reference Range Interpretation Comments

 

             CO2 (test code = CO2) 23           24-32                     



[facility]2022-04-12 12:58:00





             Test Item    Value        Reference Range Interpretation Comments

 

             Calcium Lvl (test code = Calcium Lvl) 9.0          8.5-10.5        

          



[facility]2022-04-12 12:58:00





             Test Item    Value        Reference Range Interpretation Comments

 

             AGAP (test code = AGAP) 11.6         10.0-20.0                 



[facility]2022-04-12 12:58:00





             Test Item    Value        Reference Range Interpretation Comments

 

             eGFR (test code = eGFR) 116                                    



[facility]58[facility]





             Test Item    Value        Reference Range Interpretation Comments

 

             WBC (test code = WBC) 10.5         3.7-10.4                  



[facility] BrookOqmmgooRJPGIUOOVO9449-23-94 12:58:00





             Test Item    Value        Reference Range Interpretation Comments

 

             RBC (test code = RBC) 5.48         4.70-6.10                 



[facility] Brook[facility]ddvyjiQLHSYDCBSW7253-66-45 12:58:00





             Test Item    Value        Reference Range Interpretation Comments

 

             Hgb (test code = Hgb) 16.0         14.0-18.0                 



[facility]pxvmyfXKWJQXGMJM5513-10-02 12:58:00





             Test Item    Value        Reference Range Interpretation Comments

 

             Hct (test code = Hct) 49.8         42.0-54.0                 



[facility]uzfndkFNRMKAJEWY1068-76-18 12:58:00





             Test Item    Value        Reference Range Interpretation Comments

 

             MCV (test code = MCV) 90.8         80.0-94.0                 



[facility] BrookPddrlbfNRYQQOPRRO3620-85-10 12:58:00





             Test Item    Value        Reference Range Interpretation Comments

 

             MCH (test code = MCH) 29.1 pg      27.0-31.0                 



[facility] BrookAgpgjbyHPYQHYTTRU2002-85-42 12:58:00





             Test Item    Value        Reference Range Interpretation Comments

 

             MCHC (test code = MCHC) 32.1         32.0-36.0                 



[facility] BrookHzywjpnZKYKMJIMBA0654-31-26 12:58:00





             Test Item    Value        Reference Range Interpretation Comments

 

             RDW (test code = RDW) 15.5         11.5-14.5                 



[facility] BrookFstvswgQNCBEBEMEV2393-68-48 12:58:00





             Test Item    Value        Reference Range Interpretation Comments

 

             Platelet (test code = Platelet) 312          133-450               

    



[facility] BrookMrjmrigBMMWWHIGNV4842-46-22 12:58:00





             Test Item    Value        Reference Range Interpretation Comments

 

             MPV (test code = MPV) 8.3          7.4-10.4                  



[facility] BrookDhtysydEJNCCPHUTL0180-65-62 12:58:00





             Test Item    Value        Reference Range Interpretation Comments

 

             PT (test code = PT) 12.9 s       12.0-14.7                 



[facility] BrookPdesuyjZSBPOAJIGO0184-03-49 12:58:00





             Test Item    Value        Reference Range Interpretation Comments

 

             INR (test code = INR) 0.98 1       0.85-1.17                 



[place]2952-04-12 12:58:00





             Test Item    Value        Reference Range Interpretation Comments

 

             PTT (test code = PTT) 27.3 s       22.9-35.8                 



[place] 842952-04-12 12:58:00





             Test Item    Value        Reference Range Interpretation Comments

 

             Segs (test code = Segs) 71.2         45.0-75.0                 



[place] 842952-04-12 12:58:00





             Test Item    Value        Reference Range Interpretation Comments

 

             Lymphocytes (test code = Lymphocytes) 21.1         20.0-40.0       

          



[place] [address]-04-12 12:58:00





             Test Item    Value        Reference Range Interpretation Comments

 

             Monocytes (test code = Monocytes) 6.8          2.0-12.0            

      



[place] 842952-04-12 12:58:00





             Test Item    Value        Reference Range Interpretation Comments

 

             Eosinophils (test code = 0.1          See_Comment                [A

utomated message] The



             Eosinophils)                                        system which ge

nerated



                                                                 this result tra

nsmitted



                                                                 reference range

: <=4.0.



                                                                 The reference r

zhen was



                                                                 not used to int

erpret



                                                                 this result as



                                                                 normal/abnormal

.



[facility] BrookNllzuygAVFJGFTOJA9329-66-86 12:58:00





             Test Item    Value        Reference Range Interpretation Comments

 

             Basophils (test code = 0.8          See_Comment                [Aut

omated message] The



             Basophils)                                          system which ge

nerated



                                                                 this result tra

nsmitted



                                                                 reference range

: <=1.0.



                                                                 The reference r

zhen was



                                                                 not used to int

erpret



                                                                 this result as



                                                                 normal/abnormal

.



[place] 842952-04-12 12:58:00





             Test Item    Value        Reference Range Interpretation Comments

 

             Neutrophils # (test code = Neutrophils 7.5          1.5-8.1        

           



             #)                                                  



[place] 842952-04-12 12:58:00





             Test Item    Value        Reference Range Interpretation Comments

 

             Lymphocytes # (test code = Lymphocytes 2.2          1.0-5.5        

           



             #)                                                  



[place] 842952-04-12 12:58:00





             Test Item    Value        Reference Range Interpretation Comments

 

             Monocytes # (test code 0.7          See_Comment                [Aut

omated message] The



             = Monocytes #)                                        system which 

generated



                                                                 this result tra

nsmitted



                                                                 reference range

: <=0.8.



                                                                 The reference r

zhen was



                                                                 not used to int

erpret



                                                                 this result as



                                                                 normal/abnormal

.



[place] 842952-04-12 12:58:00





             Test Item    Value        Reference Range Interpretation Comments

 

             Basophils # (test code 0.1          See_Comment                [Aut

omated message] The



             = Basophils #)                                        system which 

generated



                                                                 this result tra

nsmitted



                                                                 reference range

: <=0.2.



                                                                 The reference r

zhen was



                                                                 not used to int

erpret



                                                                 this result as



                                                                 normal/abnormal

.



[facility]2022-04-12 12:58:00





             Test Item    Value        Reference Range Interpretation Comments

 

             Glucose Lvl (test code = Glucose Lvl) 228          70-99           

          



[place] 483382-04-12 12:58:00





             Test Item    Value        Reference Range Interpretation Comments

 

             BUN (test code = BUN) 23           7-22                      



[place] 483382-04-12 12:58:00





             Test Item    Value        Reference Range Interpretation Comments

 

             Creatinine Lvl (test code = Creatinine 0.78         0.50-1.40      

           



             Lvl)                                                



[place] 483382-04-12 12:58:00





             Test Item    Value        Reference Range Interpretation Comments

 

             Sodium Lvl (test code = Sodium Lvl) 138          135-145           

        



[place] 483382-04-12 12:58:00





             Test Item    Value        Reference Range Interpretation Comments

 

             Potassium Lvl (test code = Potassium 4.6          3.5-5.1          

         



             Lvl)                                                



[facility]2022-04-12 12:58:00





             Test Item    Value        Reference Range Interpretation Comments

 

             Chloride Lvl (test code = Chloride Lvl) 108                  

            



[place] 483382-04-12 12:58:00





             Test Item    Value        Reference Range Interpretation Comments

 

             CO2 (test code = CO2) 23           24-32                     



[place] 483382-04-12 12:58:00





             Test Item    Value        Reference Range Interpretation Comments

 

             Calcium Lvl (test code = Calcium Lvl) 9.0          8.5-10.5        

          



[place] 483382-04-12 12:58:00





             Test Item    Value        Reference Range Interpretation Comments

 

             AGAP (test code = AGAP) 11.6         10.0-20.0                 



[place] 483382-04-12 12:58:00





             Test Item    Value        Reference Range Interpretation Comments

 

             eGFR (test code = eGFR) 116                                    



[place] 842952-04-12 12:58:00





             Test Item    Value        Reference Range Interpretation Comments

 

             WBC (test code = WBC) 10.5         3.7-10.4                  



[facility] BrookSkjueqxJTXXHDBNMW1587-52-35 12:58:00





             Test Item    Value        Reference Range Interpretation Comments

 

             RBC (test code = RBC) 5.48         4.70-6.10                 



[facility] BrookHhpagciVRFZFVXRBA1070-89-28 12:58:00





             Test Item    Value        Reference Range Interpretation Comments

 

             Hgb (test code = Hgb) 16.0         14.0-18.0                 



[facility] BrookKofwqbjHCIKAWKBCP9325-65-85 12:58:00





             Test Item    Value        Reference Range Interpretation Comments

 

             Hct (test code = Hct) 49.8         42.0-54.0                 



[facility] BrookPbuprowDRABOAQGLS3599-55-39 12:58:00





             Test Item    Value        Reference Range Interpretation Comments

 

             MCV (test code = MCV) 90.8         80.0-94.0                 



[facility] BrookXfgkppaJXSOPFXTIH7919-21-73 12:58:00





             Test Item    Value        Reference Range Interpretation Comments

 

             MCH (test code = MCH) 29.1 pg      27.0-31.0                 



[facility] BrookIpvbpdqWQICTGMNLV0759-66-43 12:58:00





             Test Item    Value        Reference Range Interpretation Comments

 

             MCHC (test code = MCHC) 32.1         32.0-36.0                 



[facility] BrookTjcoleoGXTZEPQYIW5264-49-21 12:58:00





             Test Item    Value        Reference Range Interpretation Comments

 

             RDW (test code = RDW) 15.5         11.5-14.5                 



[facility] BrookLcoagtgNWEMXTFJTW3415-09-76 12:58:00





             Test Item    Value        Reference Range Interpretation Comments

 

             Platelet (test code = Platelet) 312          133-450               

    



[facility] BrookGhshdyqVDCUZIGIUZ2476-00-69 12:58:00





             Test Item    Value        Reference Range Interpretation Comments

 

             MPV (test code = MPV) 8.3          7.4-10.4                  



[facility] BrookYvjlcdaAXEWIKTMJG8628-19-41 12:58:00





             Test Item    Value        Reference Range Interpretation Comments

 

             PT (test code = PT) 12.9 s       12.0-14.7                 



[facility] BrookQlbsrtwDUNQIDWFPP5978-09-48 12:58:00





             Test Item    Value        Reference Range Interpretation Comments

 

             INR (test code = INR) 0.98 1       0.85-1.17                 



[facility] BrookGnioouwHCEIPJDJGN1314-45-38 12:58:00





             Test Item    Value        Reference Range Interpretation Comments

 

             PTT (test code = PTT) 27.3 s       22.9-35.8                 



[place] 842952-04-12 12:58:00





             Test Item    Value        Reference Range Interpretation Comments

 

             Segs (test code = Segs) 71.2         45.0-75.0                 



[place] [address]-04-12 12:58:00





             Test Item    Value        Reference Range Interpretation Comments

 

             Lymphocytes (test code = Lymphocytes) 21.1         20.0-40.0       

          



[place] [address]-04-12 12:58:00





             Test Item    Value        Reference Range Interpretation Comments

 

             Monocytes (test code = Monocytes) 6.8          2.0-12.0            

      



[place] [address]-04-12 12:58:00





             Test Item    Value        Reference Range Interpretation Comments

 

             Eosinophils (test code = 0.1          See_Comment                [A

utomated message] The



             Eosinophils)                                        system which ge

nerated



                                                                 this result tra

nsmitted



                                                                 reference range

: <=4.0.



                                                                 The reference r

zhen was



                                                                 not used to int

erpret



                                                                 this result as



                                                                 normal/abnormal

.



[place] 842952-04-12 12:58:00





             Test Item    Value        Reference Range Interpretation Comments

 

             Basophils (test code = 0.8          See_Comment                [Aut

omated message] The



             Basophils)                                          system which ge

nerated



                                                                 this result tra

nsmitted



                                                                 reference range

: <=1.0.



                                                                 The reference r

zhen was



                                                                 not used to int

erpret



                                                                 this result as



                                                                 normal/abnormal

.



[place] 842952-04-12 12:58:00





             Test Item    Value        Reference Range Interpretation Comments

 

             Neutrophils # (test code = Neutrophils 7.5          1.5-8.1        

           



             #)                                                  



[place] 842952-04-12 12:58:00





             Test Item    Value        Reference Range Interpretation Comments

 

             Lymphocytes # (test code = Lymphocytes 2.2          1.0-5.5        

           



             #)                                                  



[place] [address]-04-12 12:58:00





             Test Item    Value        Reference Range Interpretation Comments

 

             Monocytes # (test code 0.7          See_Comment                [Aut

omated message] The



             = Monocytes #)                                        system which 

generated



                                                                 this result tra

nsmitted



                                                                 reference range

: <=0.8.



                                                                 The reference r

zhen was



                                                                 not used to int

erpret



                                                                 this result as



                                                                 normal/abnormal

.



[place] [address]-04-12 12:58:00





             Test Item    Value        Reference Range Interpretation Comments

 

             Basophils # (test code 0.1          See_Comment                [Aut

omated message] The



             = Basophils #)                                        system which 

generated



                                                                 this result tra

nsmitted



                                                                 reference range

: <=0.2.



                                                                 The reference r

zhen was



                                                                 not used to int

erpret



                                                                 this result as



                                                                 normal/abnormal

.



[place] 483382-04-12 12:58:00





             Test Item    Value        Reference Range Interpretation Comments

 

             Glucose Lvl (test code = Glucose Lvl) 228          70-99           

          



[place] 483382-04-12 12:58:00





             Test Item    Value        Reference Range Interpretation Comments

 

             BUN (test code = BUN) 23           7-22                      



[place] 483382-04-12 12:58:00





             Test Item    Value        Reference Range Interpretation Comments

 

             Creatinine Lvl (test code = Creatinine 0.78         0.50-1.40      

           



             Lvl)                                                



[place] 483382-04-12 12:58:00





             Test Item    Value        Reference Range Interpretation Comments

 

             Sodium Lvl (test code = Sodium Lvl) 138          135-145           

        



[place] 483382-04-12 12:58:00





             Test Item    Value        Reference Range Interpretation Comments

 

             Potassium Lvl (test code = Potassium 4.6          3.5-5.1          

         



             Lvl)                                                



[place] 483382-04-12 12:58:00





             Test Item    Value        Reference Range Interpretation Comments

 

             Chloride Lvl (test code = Chloride Lvl) 108                  

            



[place] 483382-04-12 12:58:00





             Test Item    Value        Reference Range Interpretation Comments

 

             CO2 (test code = CO2) 23           24-32                     



[place] 483382-04-12 12:58:00





             Test Item    Value        Reference Range Interpretation Comments

 

             Calcium Lvl (test code = Calcium Lvl) 9.0          8.5-10.5        

          



[place] 483382-04-12 12:58:00





             Test Item    Value        Reference Range Interpretation Comments

 

             AGAP (test code = AGAP) 11.6         10.0-20.0                 



[place] 483382-04-12 12:58:00





             Test Item    Value        Reference Range Interpretation Comments

 

             eGFR (test code = eGFR) 116                                    



[place] 842952-04-12 12:58:00





             Test Item    Value        Reference Range Interpretation Comments

 

             WBC (test code = WBC) 10.5         3.7-10.4                  



[place] 842952-04-12 12:58:00





             Test Item    Value        Reference Range Interpretation Comments

 

             RBC (test code = RBC) 5.48         4.70-6.10                 



[facility] BrookUffwldfFOPJGUTCFL8577-93-33 12:58:00





             Test Item    Value        Reference Range Interpretation Comments

 

             Hgb (test code = Hgb) 16.0         14.0-18.0                 



[facility] BrookEiplsooNMZAZTVNHA2903-57-44 12:58:00





             Test Item    Value        Reference Range Interpretation Comments

 

             Hct (test code = Hct) 49.8         42.0-54.0                 



[facility] BrookLhokoinPLKZVLSSEY1192-37-90 12:58:00





             Test Item    Value        Reference Range Interpretation Comments

 

             MCV (test code = MCV) 90.8         80.0-94.0                 



[place] 842952-04-12 12:58:00





             Test Item    Value        Reference Range Interpretation Comments

 

             MCH (test code = MCH) 29.1 pg      27.0-31.0                 



[facility] BrookRinaczpNCTDZLNZJN3558-91-06 12:58:00





             Test Item    Value        Reference Range Interpretation Comments

 

             MCHC (test code = MCHC) 32.1         32.0-36.0                 



[place] 842952-04-12 12:58:00





             Test Item    Value        Reference Range Interpretation Comments

 

             RDW (test code = RDW) 15.5         11.5-14.5                 



[facility] BrookUmqhinrMLQIZJLXCO8259-76-06 12:58:00





             Test Item    Value        Reference Range Interpretation Comments

 

             Platelet (test code = Platelet) 312          133-450               

    



[facility] BrookCuceqbqXUEGVBROJX3913-45-10 12:58:00





             Test Item    Value        Reference Range Interpretation Comments

 

             MPV (test code = MPV) 8.3          7.4-10.4                  



[place] 842952-04-12 12:58:00





             Test Item    Value        Reference Range Interpretation Comments

 

             PT (test code = PT) 12.9 s       12.0-14.7                 



[place] [address]-04-12 12:58:00





             Test Item    Value        Reference Range Interpretation Comments

 

             INR (test code = INR) 0.98 1       0.85-1.17                 



[place] [address]-04-12 12:58:00





             Test Item    Value        Reference Range Interpretation Comments

 

             PTT (test code = PTT) 27.3 s       22.9-35.8                 



[place] 842952-04-12 12:58:00





             Test Item    Value        Reference Range Interpretation Comments

 

             Segs (test code = Segs) 71.2         45.0-75.0                 



[place] [address]-04-12 12:58:00





             Test Item    Value        Reference Range Interpretation Comments

 

             Lymphocytes (test code = Lymphocytes) 21.1         20.0-40.0       

          



[place] [address]-04-12 12:58:00





             Test Item    Value        Reference Range Interpretation Comments

 

             Monocytes (test code = Monocytes) 6.8          2.0-12.0            

      



[place] [address]-04-12 12:58:00





             Test Item    Value        Reference Range Interpretation Comments

 

             Eosinophils (test code = 0.1          See_Comment                [A

utomated message] The



             Eosinophils)                                        system which ge

nerated



                                                                 this result tra

nsmitted



                                                                 reference range

: <=4.0.



                                                                 The reference r

zhen was



                                                                 not used to int

erpret



                                                                 this result as



                                                                 normal/abnormal

.



[place] [address]42-04-12 12:58:00





             Test Item    Value        Reference Range Interpretation Comments

 

             Basophils (test code = 0.8          See_Comment                [Aut

omated message] The



             Basophils)                                          system which ge

nerated



                                                                 this result tra

nsmitted



                                                                 reference range

: <=1.0.



                                                                 The reference r

zhen was



                                                                 not used to int

erpret



                                                                 this result as



                                                                 normal/abnormal

.



[place] 842952-04-12 12:58:00





             Test Item    Value        Reference Range Interpretation Comments

 

             Neutrophils # (test code = Neutrophils 7.5          1.5-8.1        

           



             #)                                                  



[place] 842952-04-12 12:58:00





             Test Item    Value        Reference Range Interpretation Comments

 

             Lymphocytes # (test code = Lymphocytes 2.2          1.0-5.5        

           



             #)                                                  



[place] [address]-04-12 12:58:00





             Test Item    Value        Reference Range Interpretation Comments

 

             Monocytes # (test code 0.7          See_Comment                [Aut

omated message] The



             = Monocytes #)                                        system which 

generated



                                                                 this result tra

nsmitted



                                                                 reference range

: <=0.8.



                                                                 The reference r

zhen was



                                                                 not used to int

erpret



                                                                 this result as



                                                                 normal/abnormal

.



[place] [address]-04-12 12:58:00





             Test Item    Value        Reference Range Interpretation Comments

 

             Basophils # (test code 0.1          See_Comment                [Aut

omated message] The



             = Basophils #)                                        system which 

generated



                                                                 this result tra

nsmitted



                                                                 reference range

: <=0.2.



                                                                 The reference r

zhen was



                                                                 not used to int

erpret



                                                                 this result as



                                                                 normal/abnormal

.



[place] 483382-04-12 12:58:00





             Test Item    Value        Reference Range Interpretation Comments

 

             Glucose Lvl (test code = Glucose Lvl) 228          70-99           

          



[place] 483382-04-12 12:58:00





             Test Item    Value        Reference Range Interpretation Comments

 

             BUN (test code = BUN) 23           7-22                      



[place] 483382-04-12 12:58:00





             Test Item    Value        Reference Range Interpretation Comments

 

             Creatinine Lvl (test code = Creatinine 0.78         0.50-1.40      

           



             Lvl)                                                



[place] 483382-04-12 12:58:00





             Test Item    Value        Reference Range Interpretation Comments

 

             Sodium Lvl (test code = Sodium Lvl) 138          135-145           

        



[place] 483382-04-12 12:58:00





             Test Item    Value        Reference Range Interpretation Comments

 

             Potassium Lvl (test code = Potassium 4.6          3.5-5.1          

         



             Lvl)                                                



[facility]2022-04-12 12:58:00





             Test Item    Value        Reference Range Interpretation Comments

 

             Chloride Lvl (test code = Chloride Lvl) 108                  

            



[place] 483382-04-12 12:58:00





             Test Item    Value        Reference Range Interpretation Comments

 

             CO2 (test code = CO2) 23           24-32                     



[facility]2022-04-12 12:58:00





             Test Item    Value        Reference Range Interpretation Comments

 

             Calcium Lvl (test code = Calcium Lvl) 9.0          8.5-10.5        

          



[facility]2022-04-12 12:58:00





             Test Item    Value        Reference Range Interpretation Comments

 

             AGAP (test code = AGAP) 11.6         10.0-20.0                 



[facility]2022-04-12 12:58:00





             Test Item    Value        Reference Range Interpretation Comments

 

             eGFR (test code = eGFR) 116                                    



[facility]





             Test Item    Value        Reference Range Interpretation Comments

 

             WBC (test code = WBC) 10.5         3.7-10.4                  



[facility]02 12:58:00





             Test Item    Value        Reference Range Interpretation Comments

 

             RBC (test code = RBC) 5.48         4.70-6.10                 



[facility] 12:58:00





             Test Item    Value        Reference Range Interpretation Comments

 

             Hgb (test code = Hgb) 16.0         14.0-18.0                 



[place] 842952-04-12 12:58:00





             Test Item    Value        Reference Range Interpretation Comments

 

             Hct (test code = Hct) 49.8         42.0-54.0                 



[place] 842952-04-12 12:58:00





             Test Item    Value        Reference Range Interpretation Comments

 

             MCV (test code = MCV) 90.8         80.0-94.0                 



[place] [address]-04-12 12:58:00





             Test Item    Value        Reference Range Interpretation Comments

 

             MCH (test code = MCH) 29.1 pg      27.0-31.0                 



[place] [address]-04-12 12:58:00





             Test Item    Value        Reference Range Interpretation Comments

 

             MCHC (test code = MCHC) 32.1         32.0-36.0                 



[place] [address]-04-12 12:58:00





             Test Item    Value        Reference Range Interpretation Comments

 

             RDW (test code = RDW) 15.5         11.5-14.5                 



[place] [address]-04-12 12:58:00





             Test Item    Value        Reference Range Interpretation Comments

 

             Platelet (test code = Platelet) 312          133-450               

    



[facility]21[facility] 12:58:00





             Test Item    Value        Reference Range Interpretation Comments

 

             MPV (test code = MPV) 8.3          7.4-10.4                  



[place] [address]-04-12 12:58:00





             Test Item    Value        Reference Range Interpretation Comments

 

             PT (test code = PT) 12.9 s       12.0-14.7                 



[place] [address]-04-12 12:58:00





             Test Item    Value        Reference Range Interpretation Comments

 

             INR (test code = INR) 0.98 1       0.85-1.17                 



[place] [address]-04-12 12:58:00





             Test Item    Value        Reference Range Interpretation Comments

 

             PTT (test code = PTT) 27.3 s       22.9-35.8                 



[place] [address]-04-12 12:58:00





             Test Item    Value        Reference Range Interpretation Comments

 

             Segs (test code = Segs) 71.2         45.0-75.0                 



[place] [address]-04-12 12:58:00





             Test Item    Value        Reference Range Interpretation Comments

 

             Lymphocytes (test code = Lymphocytes) 21.1         20.0-40.0       

          



[place] 842952-04-12 12:58:00





             Test Item    Value        Reference Range Interpretation Comments

 

             Monocytes (test code = Monocytes) 6.8          2.0-12.0            

      



[place] [address]-04-12 12:58:00





             Test Item    Value        Reference Range Interpretation Comments

 

             Eosinophils (test code = 0.1          See_Comment                [A

utomated message] The



             Eosinophils)                                        system which ge

nerated



                                                                 this result tra

nsmitted



                                                                 reference range

: <=4.0.



                                                                 The reference r

zhen was



                                                                 not used to int

erpret



                                                                 this result as



                                                                 normal/abnormal

.



[place] 842952-04-12 12:58:00





             Test Item    Value        Reference Range Interpretation Comments

 

             Basophils (test code = 0.8          See_Comment                [Aut

omated message] The



             Basophils)                                          system which ge

nerated



                                                                 this result tra

nsmitted



                                                                 reference range

: <=1.0.



                                                                 The reference r

zhen was



                                                                 not used to int

erpret



                                                                 this result as



                                                                 normal/abnormal

.



[place] 842952-04-12 12:58:00





             Test Item    Value        Reference Range Interpretation Comments

 

             Neutrophils # (test code = Neutrophils 7.5          1.5-8.1        

           



             #)                                                  



[place] [address]-04-12 12:58:00





             Test Item    Value        Reference Range Interpretation Comments

 

             Lymphocytes # (test code = Lymphocytes 2.2          1.0-5.5        

           



             #)                                                  



[place] [address]-04-12 12:58:00





             Test Item    Value        Reference Range Interpretation Comments

 

             Monocytes # (test code 0.7          See_Comment                [Aut

omated message] The



             = Monocytes #)                                        system which 

generated



                                                                 this result tra

nsmitted



                                                                 reference range

: <=0.8.



                                                                 The reference r

zhen was



                                                                 not used to int

erpret



                                                                 this result as



                                                                 normal/abnormal

.



[place] [address]-04-12 12:58:00





             Test Item    Value        Reference Range Interpretation Comments

 

             Basophils # (test code 0.1          See_Comment                [Aut

omated message] The



             = Basophils #)                                        system which 

generated



                                                                 this result tra

nsmitted



                                                                 reference range

: <=0.2.



                                                                 The reference r

zhen was



                                                                 not used to int

erpret



                                                                 this result as



                                                                 normal/abnormal

.



[place] 483382-04-12 12:58:00





             Test Item    Value        Reference Range Interpretation Comments

 

             Glucose Lvl (test code = Glucose Lvl) 228          70-99           

          



[place] 483382-04-12 12:58:00





             Test Item    Value        Reference Range Interpretation Comments

 

             BUN (test code = BUN) 23           7-22                      



[place] 483382-04-12 12:58:00





             Test Item    Value        Reference Range Interpretation Comments

 

             Creatinine Lvl (test code = Creatinine 0.78         0.50-1.40      

           



             Lvl)                                                



[place] 483382-04-12 12:58:00





             Test Item    Value        Reference Range Interpretation Comments

 

             Sodium Lvl (test code = Sodium Lvl) 138          135-145           

        



[place] 483382-04-12 12:58:00





             Test Item    Value        Reference Range Interpretation Comments

 

             Potassium Lvl (test code = Potassium 4.6          3.5-5.1          

         



             Lvl)                                                



[place] 483382-04-12 12:58:00





             Test Item    Value        Reference Range Interpretation Comments

 

             Chloride Lvl (test code = Chloride Lvl) 108                  

            



[place] 483382-04-12 12:58:00





             Test Item    Value        Reference Range Interpretation Comments

 

             CO2 (test code = CO2)            24-32                     



[place] 483382-04-12 12:58:00





             Test Item    Value        Reference Range Interpretation Comments

 

             Calcium Lvl (test code = Calcium Lvl) 9.0          8.5-10.5        

          



[place] 483382-04-12 12:58:00





             Test Item    Value        Reference Range Interpretation Comments

 

             AGAP (test code = AGAP) 11.6         10.0-20.0                 



[place] 483382-04-12 12:58:00





             Test Item    Value        Reference Range Interpretation Comments

 

             eGFR (test code = eGFR) 116                                    



[facility]





             Test Item    Value        Reference Range Interpretation Comments

 

             WBC (test code = WBC) 10.5         3.7-10.4                  



[place] 842952-04-12 12:58:00





             Test Item    Value        Reference Range Interpretation Comments

 

             RBC (test code = RBC) 5.48         4.70-6.10                 



[place] [address]-04-12 12:58:00





             Test Item    Value        Reference Range Interpretation Comments

 

             Hgb (test code = Hgb) 16.0         14.0-18.0                 



[place] [address]-04-12 12:58:00





             Test Item    Value        Reference Range Interpretation Comments

 

             Hct (test code = Hct) 49.8         42.0-54.0                 



[place] 842952-04-12 12:58:00





             Test Item    Value        Reference Range Interpretation Comments

 

             MCV (test code = MCV) 90.8         80.0-94.0                 



[place] 842952-04-12 12:58:00





             Test Item    Value        Reference Range Interpretation Comments

 

             MCH (test code = MCH) 29.1 pg      27.0-31.0                 



[place] 842952-04-12 12:58:00





             Test Item    Value        Reference Range Interpretation Comments

 

             MCHC (test code = MCHC) 32.1         32.0-36.0                 



[place] 842952-04-12 12:58:00





             Test Item    Value        Reference Range Interpretation Comments

 

             RDW (test code = RDW) 15.5         11.5-14.5                 



[place] 842952-04-12 12:58:00





             Test Item    Value        Reference Range Interpretation Comments

 

             Platelet (test code = Platelet) 312          133-450               

    



[facility]58:[facility]





             Test Item    Value        Reference Range Interpretation Comments

 

             MPV (test code = MPV) 8.3          7.4-10.4                  



[place] 842952-04-12 12:58:00





             Test Item    Value        Reference Range Interpretation Comments

 

             PT (test code = PT) 12.9 s       12.0-14.7                 



[place] [address]-04-12 12:58:00





             Test Item    Value        Reference Range Interpretation Comments

 

             INR (test code = INR) 0.98 1       0.85-1.17                 



[place] [address]-04-12 12:58:00





             Test Item    Value        Reference Range Interpretation Comments

 

             PTT (test code = PTT) 27.3 s       22.9-35.8                 



[place] [address]-04-12 12:58:00





             Test Item    Value        Reference Range Interpretation Comments

 

             Segs (test code = Segs) 71.2         45.0-75.0                 



[place] [address]-04-12 12:58:00





             Test Item    Value        Reference Range Interpretation Comments

 

             Lymphocytes (test code = Lymphocytes) 21.1         20.0-40.0       

          



[place] [address]-04-12 12:58:00





             Test Item    Value        Reference Range Interpretation Comments

 

             Monocytes (test code = Monocytes) 6.8          2.0-12.0            

      



[place] 842952-04-12 12:58:00





             Test Item    Value        Reference Range Interpretation Comments

 

             Eosinophils (test code = 0.1          See_Comment                [A

utomated message] The



             Eosinophils)                                        system which ge

nerated



                                                                 this result tra

nsmitted



                                                                 reference range

: <=4.0.



                                                                 The reference r

zhen was



                                                                 not used to int

erpret



                                                                 this result as



                                                                 normal/abnormal

.



[place] 842952-04-12 12:58:00





             Test Item    Value        Reference Range Interpretation Comments

 

             Basophils (test code = 0.8          See_Comment                [Aut

omated message] The



             Basophils)                                          system which ge

nerated



                                                                 this result tra

nsmitted



                                                                 reference range

: <=1.0.



                                                                 The reference r

zhen was



                                                                 not used to int

erpret



                                                                 this result as



                                                                 normal/abnormal

.



[place] 842952-04-12 12:58:00





             Test Item    Value        Reference Range Interpretation Comments

 

             Neutrophils # (test code = Neutrophils 7.5          1.5-8.1        

           



             #)                                                  



[place] 842952-04-12 12:58:00





             Test Item    Value        Reference Range Interpretation Comments

 

             Lymphocytes # (test code = Lymphocytes 2.2          1.0-5.5        

           



             #)                                                  



[place] 842952-04-12 12:58:00





             Test Item    Value        Reference Range Interpretation Comments

 

             Monocytes # (test code 0.7          See_Comment                [Aut

omated message] The



             = Monocytes #)                                        system which 

generated



                                                                 this result tra

nsmitted



                                                                 reference range

: <=0.8.



                                                                 The reference r

zhen was



                                                                 not used to int

erpret



                                                                 this result as



                                                                 normal/abnormal

.



[place] 842952-04-12 12:58:00





             Test Item    Value        Reference Range Interpretation Comments

 

             Basophils # (test code 0.1          See_Comment                [Aut

omated message] The



             = Basophils #)                                        system which 

generated



                                                                 this result tra

nsmitted



                                                                 reference range

: <=0.2.



                                                                 The reference r

zhen was



                                                                 not used to int

erpret



                                                                 this result as



                                                                 normal/abnormal

.



[place] 483382-04-12 12:58:00





             Test Item    Value        Reference Range Interpretation Comments

 

             Glucose Lvl (test code = Glucose Lvl) 228          70-99           

          



[place] 483382-04-12 12:58:00





             Test Item    Value        Reference Range Interpretation Comments

 

             BUN (test code = BUN) 23           7-22                      



[place] 483382-04-12 12:58:00





             Test Item    Value        Reference Range Interpretation Comments

 

             Creatinine Lvl (test code = Creatinine 0.78         0.50-1.40      

           



             Lvl)                                                



[place] 483382-04-12 12:58:00





             Test Item    Value        Reference Range Interpretation Comments

 

             Sodium Lvl (test code = Sodium Lvl) 138          135-145           

        



[place] 483382-04-12 12:58:00





             Test Item    Value        Reference Range Interpretation Comments

 

             Potassium Lvl (test code = Potassium 4.6          3.5-5.1          

         



             Lvl)                                                



[place] 483382-04-12 12:58:00





             Test Item    Value        Reference Range Interpretation Comments

 

             Chloride Lvl (test code = Chloride Lvl) 108                  

            



[place] 483382-04-12 12:58:00





             Test Item    Value        Reference Range Interpretation Comments

 

             CO2 (test code = CO2) 23           24-32                     



[place] 483382-04-12 12:58:00





             Test Item    Value        Reference Range Interpretation Comments

 

             Calcium Lvl (test code = Calcium Lvl) 9.0          8.5-10.5        

          



[place] 483382-04-12 12:58:00





             Test Item    Value        Reference Range Interpretation Comments

 

             AGAP (test code = AGAP) 11.6         10.0-20.0                 



[place] 483382-04-12 12:58:00





             Test Item    Value        Reference Range Interpretation Comments

 

             eGFR (test code = eGFR) 116                                    



[place] 842952-04-12 12:58:00





             Test Item    Value        Reference Range Interpretation Comments

 

             WBC (test code = WBC) 10.5         3.7-10.4                  



[place] [address]-04-12 12:58:00





             Test Item    Value        Reference Range Interpretation Comments

 

             RBC (test code = RBC) 5.48         4.70-6.10                 



[place] [address]-04-12 12:58:00





             Test Item    Value        Reference Range Interpretation Comments

 

             Hgb (test code = Hgb) 16.0         14.0-18.0                 



[place] [address]-04-12 12:58:00





             Test Item    Value        Reference Range Interpretation Comments

 

             Hct (test code = Hct) 49.8         42.0-54.0                 



[place] [address]-04-12 12:58:00





             Test Item    Value        Reference Range Interpretation Comments

 

             MCV (test code = MCV) 90.8         80.0-94.0                 



[place] [address]-04-12 12:58:00





             Test Item    Value        Reference Range Interpretation Comments

 

             MCH (test code = MCH) 29.1 pg      27.0-31.0                 



[place] 842952-04-12 12:58:00





             Test Item    Value        Reference Range Interpretation Comments

 

             MCHC (test code = MCHC) 32.1         32.0-36.0                 



[id number]





             Test Item    Value        Reference Range Interpretation Comments

 

             RDW (test code = RDW) 15.5         11.5-14.5                 



[place] 842952-04-12 12:58:00





             Test Item    Value        Reference Range Interpretation Comments

 

             Platelet (test code = Platelet) 312          133-450               

    



[facility]tpfgouBEUIPGHAMH7897[facility]21-94 12:58:00





             Test Item    Value        Reference Range Interpretation Comments

 

             MPV (test code = MPV) 8.3          7.4-10.4                  



[place] [address]-04-12 12:58:00





             Test Item    Value        Reference Range Interpretation Comments

 

             PT (test code = PT) 12.9 s       12.0-14.7                 



[place] 842952-04-12 12:58:00





             Test Item    Value        Reference Range Interpretation Comments

 

             INR (test code = INR) 0.98 1       0.85-1.17                 



[place] 842952-04-12 12:58:00





             Test Item    Value        Reference Range Interpretation Comments

 

             PTT (test code = PTT) 27.3 s       22.9-35.8                 



[place] 842952-04-12 12:58:00





             Test Item    Value        Reference Range Interpretation Comments

 

             Segs (test code = Segs) 71.2         45.0-75.0                 



[place] 842952-04-12 12:58:00





             Test Item    Value        Reference Range Interpretation Comments

 

             Lymphocytes (test code = Lymphocytes) 21.1         20.0-40.0       

          



[place] [address]-04-12 12:58:00





             Test Item    Value        Reference Range Interpretation Comments

 

             Monocytes (test code = Monocytes) 6.8          2.0-12.0            

      



[place] [address]-04-12 12:58:00





             Test Item    Value        Reference Range Interpretation Comments

 

             Eosinophils (test code = 0.1          See_Comment                [A

utomated message] The



             Eosinophils)                                        system which ge

nerated



                                                                 this result tra

nsmitted



                                                                 reference range

: <=4.0.



                                                                 The reference r

zhen was



                                                                 not used to int

erpret



                                                                 this result as



                                                                 normal/abnormal

.



[address]04-12 12:58:00





             Test Item    Value        Reference Range Interpretation Comments

 

             Basophils (test code = 0.8          See_Comment                [Aut

omated message] The



             Basophils)                                          system which ge

nerated



                                                                 this result tra

nsmitted



                                                                 reference range

: <=1.0.



                                                                 The reference r

zhen was



                                                                 not used to int

erpret



                                                                 this result as



                                                                 normal/abnormal

.



[facility]bbqnrvZSPVEDOXNY1051[facility]-95 12:58:00





             Test Item    Value        Reference Range Interpretation Comments

 

             Neutrophils # (test code = Neutrophils 7.5          1.5-8.1        

           



             #)                                                  



[facility] BrookTutpdvsHAPCXUTBDA6198-84-77 12:58:00





             Test Item    Value        Reference Range Interpretation Comments

 

             Lymphocytes # (test code = Lymphocytes 2.2          1.0-5.5        

           



             #)                                                  



[facility] BrookXvvgiygDPSQPVGGIT8460-57-27 12:58:00





             Test Item    Value        Reference Range Interpretation Comments

 

             Monocytes # (test code 0.7          See_Comment                [Aut

omated message] The



             = Monocytes #)                                        system which 

generated



                                                                 this result tra

nsmitted



                                                                 reference range

: <=0.8.



                                                                 The reference r

zhen was



                                                                 not used to int

erpret



                                                                 this result as



                                                                 normal/abnormal

.



[facility] BrookRvoksntQHHRBKJAXE9966-36-37 12:58:00





             Test Item    Value        Reference Range Interpretation Comments

 

             Basophils # (test code 0.1          See_Comment                [Aut

omated message] The



             = Basophils #)                                        system which 

generated



                                                                 this result tra

nsmitted



                                                                 reference range

: <=0.2.



                                                                 The reference r

zhen was



                                                                 not used to int

erpret



                                                                 this result as



                                                                 normal/abnormal

.



[facility]2022-04-12 11:59:00





             Test Item    Value        Reference Range Interpretation Comments

 

             Tube Num CSF (test code = Tube Num CSF) 3 1                        

            



[facility]2022-04-12 11:59:00





             Test Item    Value        Reference Range Interpretation Comments

 

             Color CSF (test code Colorless (22 6:59                       

    



             = Color CSF) AM)                                    



[place] 929332-04-12 11:59:00





             Test Item    Value        Reference Range Interpretation Comments

 

             Clarity CSF (test code = Clear (22 6:59                       

    



             Clarity CSF) AM)                                    



[facility]2022-04-12 11:59:00





             Test Item    Value        Reference Range Interpretation Comments

 

             Supernat CSF (test Colorless (22 6:59                         

  



             code = Supernat CSF) AM)                                    



[facility]2022-04-12 11:59:00





             Test Item    Value        Reference Range Interpretation Comments

 

             Nucleated Cells CSF 3            See_Comment                [Automa

huma message] The



             (test code = Nucleated                                        syste

m which generated



             Cells CSF)                                          this result tra

nsmitted



                                                                 reference range

: <=53.



                                                                 The reference r

zhen was



                                                                 not used to int

erpret



                                                                 this result as



                                                                 normal/abnormal

.



[facility]2022-04-12 11:59:00





             Test Item    Value        Reference Range Interpretation Comments

 

             RBC CSF (test code = 64           See_Comment                [Autom

ated message] The



             RBC CSF)                                            system which ge

nerated this



                                                                 result transmit

huma



                                                                 reference range

: <=03. The



                                                                 reference range

 was not



                                                                 used to interpr

et this



                                                                 result as zayda

l/abnormal.



[facility]2022-04-12 11:59:00





             Test Item    Value        Reference Range Interpretation Comments

 

             Comment CSF (test Differential not performed                       

    



             code = Comment CSF) on WBC count of less than                      

     



                          5. Cell counts performed                           



                          on CSF greater than two                           



                          hours after collection may                           



                          not be representative due                           



                          to cellular degradation.                           



[facility]2022-04-12 11:59:00





             Test Item    Value        Reference Range Interpretation Comments

 

             Glucose CSF (test code = Glucose CSF) 129          45-80           

          



[facility]2022-04-12 11:59:00





             Test Item    Value        Reference Range Interpretation Comments

 

             Protein CSF (test code = Protein CSF) 110          15-45           

          



[facility] [id number]59:[facility]





             Test Item    Value        Reference Range Interpretation Comments

 

             Gram Stain Report Gram Stain Performed By:                         

  



             (test code = Gram [facility]                           



             Stain Report) [facility] w/Gram [id number] 11:59:00





             Test Item    Value        Reference Range Interpretation Comments

 

             Culture: CSF w/Gram 48 Hour Report - No                           



             Stain (test code = Growth, Holding                           



             Culture: CSF w/Gram                                        



             Stain)                                              



[facility]2022-04-12 11:59:00





             Test Item    Value        Reference Range Interpretation Comments

 

             Tube Num CSF (test code = Tube Num CSF) 3 1                        

            



[facility]2022-04-12 11:59:00





             Test Item    Value        Reference Range Interpretation Comments

 

             Color CSF (test code Colorless (22 6:59                       

    



             = Color CSF) AM)                                    



[facility]2022-04-12 11:59:00





             Test Item    Value        Reference Range Interpretation Comments

 

             Clarity CSF (test code = Clear (22 6:59                       

    



             Clarity CSF) AM)                                    



[facility]2022-04-12 11:59:00





             Test Item    Value        Reference Range Interpretation Comments

 

             Supernat CSF (test Colorless (22 6:59                         

  



             code = Supernat CSF) AM)                                    



[facility]2022-04-12 11:59:00





             Test Item    Value        Reference Range Interpretation Comments

 

             Nucleated Cells CSF 3            See_Comment                [Automa

huma message] The



             (test code = Nucleated                                        syste

m which generated



             Cells CSF)                                          this result tra

nsmitted



                                                                 reference range

: <=53.



                                                                 The reference r

zhen was



                                                                 not used to int

erpret



                                                                 this result as



                                                                 normal/abnormal

.



[facility]2022-04-12 11:59:00





             Test Item    Value        Reference Range Interpretation Comments

 

             RBC CSF (test code = 64           See_Comment                [Autom

ated message] The



             RBC CSF)                                            system which ge

nerated this



                                                                 result transmit

huma



                                                                 reference range

: <=03. The



                                                                 reference range

 was not



                                                                 used to interpr

et this



                                                                 result as zayda

l/abnormal.



[facility]2022-04-12 11:59:00





             Test Item    Value        Reference Range Interpretation Comments

 

             Comment CSF (test Differential not performed                       

    



             code = Comment CSF) on WBC count of less than                      

     



                          5. Cell counts performed                           



                          on CSF greater than two                           



                          hours after collection may                           



                          not be representative due                           



                          to cellular degradation.                           



[facility]2022-04-12 11:59:00





             Test Item    Value        Reference Range Interpretation Comments

 

             Glucose CSF (test code = Glucose CSF) 129          45-80           

          



[facility]2022-04-12 11:59:00





             Test Item    Value        Reference Range Interpretation Comments

 

             Protein CSF (test code = Protein CSF) 110          15-45           

          



[facility] [id number] 11:59:00





             Test Item    Value        Reference Range Interpretation Comments

 

             Gram Stain Report Gram Stain Performed By:                         

  



             (test code = Gram [facility]                           



             Stain Report) [facility] CSF w/Gram [id number] 11:59:00





             Test Item    Value        Reference Range Interpretation Comments

 

             Culture: CSF w/Gram 48 Hour Report - No                           



             Stain (test code = Growth, Holding                           



             Culture: CSF w/Gram                                        



             Stain)                                              



[facility]2022-04-12 11:59:00





             Test Item    Value        Reference Range Interpretation Comments

 

             Tube Num CSF (test code = Tube Num CSF) 3 1                        

            



[facility]2022-04-12 11:59:00





             Test Item    Value        Reference Range Interpretation Comments

 

             Color CSF (test code Colorless (22 6:59                       

    



             = Color CSF) AM)                                    



[facility]2022-04-12 11:59:00





             Test Item    Value        Reference Range Interpretation Comments

 

             Clarity CSF (test code = Clear (22 6:59                       

    



             Clarity CSF) AM)                                    



[facility]2022-04-12 11:59:00





             Test Item    Value        Reference Range Interpretation Comments

 

             Supernat CSF (test Colorless (22 6:59                         

  



             code = Supernat CSF) AM)                                    



[facility]2022-04-12 11:59:00





             Test Item    Value        Reference Range Interpretation Comments

 

             Nucleated Cells CSF 3            See_Comment                [Automa

huma message] The



             (test code = Nucleated                                        syste

m which generated



             Cells CSF)                                          this result tra

nsmitted



                                                                 reference range

: <=53.



                                                                 The reference r

zhen was



                                                                 not used to int

erpret



                                                                 this result as



                                                                 normal/abnormal

.



[facility]2022-04-12 11:59:00





             Test Item    Value        Reference Range Interpretation Comments

 

             RBC CSF (test code = 64           See_Comment                [Autom

ated message] The



             RBC CSF)                                            system which ge

nerated this



                                                                 result transmit

huma



                                                                 reference range

: <=03. The



                                                                 reference range

 was not



                                                                 used to interpr

et this



                                                                 result as zayda

l/abnormal.



[facility]2022-04-12 11:59:00





             Test Item    Value        Reference Range Interpretation Comments

 

             Comment CSF (test Differential not performed                       

    



             code = Comment CSF) on WBC count of less than                      

     



                          5. Cell counts performed                           



                          on CSF greater than two                           



                          hours after collection may                           



                          not be representative due                           



                          to cellular degradation.                           



[facility]2022-04-12 11:59:00





             Test Item    Value        Reference Range Interpretation Comments

 

             Glucose CSF (test code = Glucose CSF) 129          45-80           

          



[facility]2022-04-12 11:59:00





             Test Item    Value        Reference Range Interpretation Comments

 

             Protein CSF (test code = Protein CSF) 110          15-45           

          



[facility] [id number] 11:59:00





             Test Item    Value        Reference Range Interpretation Comments

 

             Gram Stain Report Gram Stain Performed By:                         

  



             (test code = Gram [facility]                           



             Stain Report) [facility]: CSF w/Gram [id number] 11:59:00





             Test Item    Value        Reference Range Interpretation Comments

 

             Culture: CSF w/Gram 48 Hour Report - No                           



             Stain (test code = Growth, Holding                           



             Culture: CSF w/Gram                                        



             Stain)                                              



[facility]2022-04-12 11:59:00





             Test Item    Value        Reference Range Interpretation Comments

 

             Tube Num CSF (test code = Tube Num CSF) 3 1                        

            



[facility]2022-04-12 11:59:00





             Test Item    Value        Reference Range Interpretation Comments

 

             Color CSF (test code Colorless (22 6:59                       

    



             = Color CSF) AM)                                    



[facility]2022-04-12 11:59:00





             Test Item    Value        Reference Range Interpretation Comments

 

             Clarity CSF (test code = Clear (22 6:59                       

    



             Clarity CSF) AM)                                    



[facility]2022-04-12 11:59:00





             Test Item    Value        Reference Range Interpretation Comments

 

             Supernat CSF (test Colorless (22 6:59                         

  



             code = Supernat CSF) AM)                                    



[facility]2022-04-12 11:59:00





             Test Item    Value        Reference Range Interpretation Comments

 

             Nucleated Cells CSF 3            See_Comment                [Automa

huma message] The



             (test code = Nucleated                                        syste

m which generated



             Cells CSF)                                          this result tra

nsmitted



                                                                 reference range

: <=53.



                                                                 The reference r

zhen was



                                                                 not used to int

erpret



                                                                 this result as



                                                                 normal/abnormal

.



[facility]2022-04-12 11:59:00





             Test Item    Value        Reference Range Interpretation Comments

 

             RBC CSF (test code = 64           See_Comment                [Autom

ated message] The



             RBC CSF)                                            system which ge

nerated this



                                                                 result transmit

huma



                                                                 reference range

: <=03. The



                                                                 reference range

 was not



                                                                 used to interpr

et this



                                                                 result as zayda

l/abnormal.



[facility]2022-04-12 11:59:00





             Test Item    Value        Reference Range Interpretation Comments

 

             Comment CSF (test Differential not performed                       

    



             code = Comment CSF) on WBC count of less than                      

     



                          5. Cell counts performed                           



                          on CSF greater than two                           



                          hours after collection may                           



                          not be representative due                           



                          to cellular degradation.                           



[facility]2022-04-12 11:59:00





             Test Item    Value        Reference Range Interpretation Comments

 

             Glucose CSF (test code = Glucose CSF) 129          45-80           

          



[facility] [id number] 11:59:00





             Test Item    Value        Reference Range Interpretation Comments

 

             Protein CSF (test code = Protein CSF) 110          15-45           

          



[facility] [id number]59[facility]00





             Test Item    Value        Reference Range Interpretation Comments

 

             Gram Stain Report Gram Stain Performed By:                         

  



             (test code = Gram [facility]                           



             Stain Report) [facility] w/Gram [id number] 11:59:00





             Test Item    Value        Reference Range Interpretation Comments

 

             Culture: CSF w/Gram 48 Hour Report - No                           



             Stain (test code = Growth, Holding                           



             Culture: CSF w/Gram                                        



             Stain)                                              



[facility]2022-04-12 11:59:00





             Test Item    Value        Reference Range Interpretation Comments

 

             Tube Num CSF (test code = Tube Num CSF) 3 1                        

            



[facility]2022-04-12 11:59:00





             Test Item    Value        Reference Range Interpretation Comments

 

             Color CSF (test code Colorless (22 6:59                       

    



             = Color CSF) AM)                                    



[facility]2022-04-12 11:59:00





             Test Item    Value        Reference Range Interpretation Comments

 

             Clarity CSF (test code = Clear (22 6:59                       

    



             Clarity CSF) AM)                                    



[facility]2022-04-12 11:59:00





             Test Item    Value        Reference Range Interpretation Comments

 

             Supernat CSF (test Colorless (22 6:59                         

  



             code = Supernat CSF) AM)                                    



[facility]2022-04-12 11:59:00





             Test Item    Value        Reference Range Interpretation Comments

 

             Nucleated Cells CSF 3            See_Comment                [Automa

huma message] The



             (test code = Nucleated                                        syste

m which generated



             Cells CSF)                                          this result tra

nsmitted



                                                                 reference range

: <=53.



                                                                 The reference r

zhen was



                                                                 not used to int

erpret



                                                                 this result as



                                                                 normal/abnormal

.



[facility]2022-04-12 11:59:00





             Test Item    Value        Reference Range Interpretation Comments

 

             RBC CSF (test code = 64           See_Comment                [Autom

ated message] The



             RBC CSF)                                            system which ge

nerated this



                                                                 result transmit

huma



                                                                 reference range

: <=03. The



                                                                 reference range

 was not



                                                                 used to interpr

et this



                                                                 result as zayda

l/abnormal.



[facility]2022-04-12 11:59:00





             Test Item    Value        Reference Range Interpretation Comments

 

             Comment CSF (test Differential not performed                       

    



             code = Comment CSF) on WBC count of less than                      

     



                          5. Cell counts performed                           



                          on CSF greater than two                           



                          hours after collection may                           



                          not be representative due                           



                          to cellular degradation.                           



[facility]2022-04-12 11:59:00





             Test Item    Value        Reference Range Interpretation Comments

 

             Glucose CSF (test code = Glucose CSF) 129          45-80           

          



[facility] [id number] 11:59:00





             Test Item    Value        Reference Range Interpretation Comments

 

             Protein CSF (test code = Protein CSF) 110          15-45           

          



[facility] [id number] 11:59:00





             Test Item    Value        Reference Range Interpretation Comments

 

             Gram Stain Report Gram Stain Performed By:                         

  



             (test code = Gram [facility]                           



             Stain Report) [facility] CSF w/Gram [id number] 11:59:00





             Test Item    Value        Reference Range Interpretation Comments

 

             Culture: CSF w/Gram 48 Hour Report - No                           



             Stain (test code = Growth, Holding                           



             Culture: CSF w/Gram                                        



             Stain)                                              



[facility]2022-04-12 11:59:00





             Test Item    Value        Reference Range Interpretation Comments

 

             Tube Num CSF (test code = Tube Num CSF) 3 1                        

            



[facility]2022-04-12 11:59:00





             Test Item    Value        Reference Range Interpretation Comments

 

             Color CSF (test code Colorless (22 6:59                       

    



             = Color CSF) AM)                                    



[facility]2022-04-12 11:59:00





             Test Item    Value        Reference Range Interpretation Comments

 

             Clarity CSF (test code = Clear (22 6:59                       

    



             Clarity CSF) AM)                                    



[facility]2022-04-12 11:59:00





             Test Item    Value        Reference Range Interpretation Comments

 

             Supernat CSF (test Colorless (22 6:59                         

  



             code = Supernat CSF) AM)                                    



[facility]2022-04-12 11:59:00





             Test Item    Value        Reference Range Interpretation Comments

 

             Nucleated Cells CSF 3            See_Comment                [Automa

huma message] The



             (test code = Nucleated                                        syste

m which generated



             Cells CSF)                                          this result tra

nsmitted



                                                                 reference range

: <=53.



                                                                 The reference r

zhen was



                                                                 not used to int

erpret



                                                                 this result as



                                                                 normal/abnormal

.



[facility]2022-04-12 11:59:00





             Test Item    Value        Reference Range Interpretation Comments

 

             RBC CSF (test code = 64           See_Comment                [Autom

ated message] The



             RBC CSF)                                            system which ge

nerated this



                                                                 result transmit

huma



                                                                 reference range

: <=03. The



                                                                 reference range

 was not



                                                                 used to interpr

et this



                                                                 result as zayda

l/abnormal.



[facility]2022-04-12 11:59:00





             Test Item    Value        Reference Range Interpretation Comments

 

             Comment CSF (test Differential not performed                       

    



             code = Comment CSF) on WBC count of less than                      

     



                          5. Cell counts performed                           



                          on CSF greater than two                           



                          hours after collection may                           



                          not be representative due                           



                          to cellular degradation.                           



[id number]59[facility]00





             Test Item    Value        Reference Range Interpretation Comments

 

             Glucose CSF (test code = Glucose CSF) 129          45-80           

          



[facility] [id number] 11:59:00





             Test Item    Value        Reference Range Interpretation Comments

 

             Protein CSF (test code = Protein CSF) 110          15-45           

          



[facility] [id number] 11:59:00





             Test Item    Value        Reference Range Interpretation Comments

 

             Gram Stain Report Gram Stain Performed By:                         

  



             (test code = Gram [facility]                           



             Stain Report) [facility] CSF w/Gram [id number] 11:59:00





             Test Item    Value        Reference Range Interpretation Comments

 

             Culture: CSF w/Gram 48 Hour Report - No                           



             Stain (test code = Growth, Holding                           



             Culture: CSF w/Gram                                        



             Stain)                                              



[facility] [id number] 14:46:00





             Test Item    Value        Reference Range Interpretation Comments

 

             Hgb A1C (test code = Hgb A1C) 5.6                                  

  



[facility] [id number] 14:46:00





             Test Item    Value        Reference Range Interpretation Comments

 

             Hgb A1C (test code = Hgb A1C) 5.6                                  

  



[facility] [id number] 14:46:00





             Test Item    Value        Reference Range Interpretation Comments

 

             Hgb A1C (test code = Hgb A1C) 5.6                                  

  



[facility] [id number] 14:46:00





             Test Item    Value        Reference Range Interpretation Comments

 

             Hgb A1C (test code = Hgb A1C) 5.6                                  

  



[facility] [id number] 14:46:00





             Test Item    Value        Reference Range Interpretation Comments

 

             Hgb A1C (test code = Hgb A1C) 5.6                                  

  



[facility] [id number] 14:46:00





             Test Item    Value        Reference Range Interpretation Comments

 

             Hgb A1C (test code = Hgb A1C) 5.6                                  

  



[facility] [id number] 11:43:00





             Test Item    Value        Reference Range Interpretation Comments

 

             Glucose Lvl (test code = Glucose Lvl) 418          70-99           

          



[facility] [id number] 11:43:00





             Test Item    Value        Reference Range Interpretation Comments

 

             BUN (test code = BUN) 22           7-22                      



[facility]2022-04-11 11:43:00





             Test Item    Value        Reference Range Interpretation Comments

 

             Creatinine Lvl (test code = Creatinine 1.10         0.50-1.40      

           



             Lvl)                                                



[facility]2022-04-11 11:43:00





             Test Item    Value        Reference Range Interpretation Comments

 

             Sodium Lvl (test code = Sodium Lvl) 138          135-145           

        



[place] 483382-04-11 11:43:00





             Test Item    Value        Reference Range Interpretation Comments

 

             Potassium Lvl (test code = Potassium 4.9          3.5-5.1          

         



             Lvl)                                                



[place] 483382-04-11 11:43:00





             Test Item    Value        Reference Range Interpretation Comments

 

             Chloride Lvl (test code = Chloride Lvl) 113                  

            



[facility]2022-04-11 11:43:00





             Test Item    Value        Reference Range Interpretation Comments

 

             CO2 (test code = CO2) 16           24-32                     



[place] 483382-04-11 11:43:00





             Test Item    Value        Reference Range Interpretation Comments

 

             AGAP (test code = AGAP) 13.9         10.0-20.0                 



[facility]2022-04-11 11:43:00





             Test Item    Value        Reference Range Interpretation Comments

 

             Calcium Lvl (test code = Calcium Lvl) 9.0          8.5-10.5        

          



[facility]2022-04-11 11:43:00





             Test Item    Value        Reference Range Interpretation Comments

 

             eGFR (test code = eGFR) 86                                     



[facility]uyhlshKXMJOJNUIX[facility]15[facility]





             Test Item    Value        Reference Range Interpretation Comments

 

             WBC (test code = WBC) 10.2         3.7-10.4                  



[place] 842952-04-11 11:43:00





             Test Item    Value        Reference Range Interpretation Comments

 

             RBC (test code = RBC) 5.46         4.70-6.10                 



[place] 842952-04-11 11:43:00





             Test Item    Value        Reference Range Interpretation Comments

 

             Hgb (test code = Hgb) 16.3         14.0-18.0                 



[place] [address]-04-11 11:43:00





             Test Item    Value        Reference Range Interpretation Comments

 

             Hct (test code = Hct) 50.0         42.0-54.0                 



[place] 842952-04-11 11:43:00





             Test Item    Value        Reference Range Interpretation Comments

 

             MCV (test code = MCV) 91.4         80.0-94.0                 



[facility] BrookRkqxmmqPOXYCLMJFS7785-45-03 11:43:00





             Test Item    Value        Reference Range Interpretation Comments

 

             MCH (test code = MCH) 29.9 pg      27.0-31.0                 



[facility] BrookIjsqaauNJPSQTUAFD3776-40-20 11:43:00





             Test Item    Value        Reference Range Interpretation Comments

 

             MCHC (test code = MCHC) 32.7         32.0-36.0                 



[facility] BrookSgtpfhiCXOMZAPEJQ4354-88-34 11:43:00





             Test Item    Value        Reference Range Interpretation Comments

 

             RDW (test code = RDW) 15.7         11.5-14.5                 



[facility] BrookStemmolHBJSOQUGRE1955-65-53 11:43:00





             Test Item    Value        Reference Range Interpretation Comments

 

             Platelet (test code = Platelet) 321          133-450               

    



[facility] BrookGjgzwjiLDAMCOCSTG6163-25-19 11:43:00





             Test Item    Value        Reference Range Interpretation Comments

 

             MPV (test code = MPV) 8.6          7.4-10.4                  



[facility] BrookOruimrmKMSJLMZMOW6095-30-93 11:43:00





             Test Item    Value        Reference Range Interpretation Comments

 

             Segs (test code = Segs) 92.0         45.0-75.0                 



[facility] BrookKlzljokMSDLJDWEIY6629-36-70 11:43:00





             Test Item    Value        Reference Range Interpretation Comments

 

             Lymphocytes (test code = Lymphocytes) 5.2          20.0-40.0       

          



[facility] BrookRbygdgaUKKYXTNPQZ0071-04-96 11:43:00





             Test Item    Value        Reference Range Interpretation Comments

 

             Monocytes (test code = Monocytes) 1.6          2.0-12.0            

      



[facility] BrookEssalazKBKGYYISIO4055-35-94 11:43:00





             Test Item    Value        Reference Range Interpretation Comments

 

             Basophils (test code = 1.2          See_Comment                [Aut

omated message] The



             Basophils)                                          system which ge

nerated



                                                                 this result tra

nsmitted



                                                                 reference range

: <=1.0.



                                                                 The reference r

zhen was



                                                                 not used to int

erpret



                                                                 this result as



                                                                 normal/abnormal

.



[facility] BrookPzvlkzwNPMWRKEBVG7249-28-47 11:43:00





             Test Item    Value        Reference Range Interpretation Comments

 

             Neutrophils # (test code = Neutrophils 9.4          1.5-8.1        

           



             #)                                                  



[facility] BrookOhwhspfJIJPSALAOC1781-15-02 11:43:00





             Test Item    Value        Reference Range Interpretation Comments

 

             Lymphocytes # (test code = Lymphocytes 0.5          1.0-5.5        

           



             #)                                                  



[place] 842952-04-11 11:43:00





             Test Item    Value        Reference Range Interpretation Comments

 

             Monocytes # (test code 0.2          See_Comment                [Aut

omated message] The



             = Monocytes #)                                        system which 

generated



                                                                 this result tra

nsmitted



                                                                 reference range

: <=0.8.



                                                                 The reference r

zhen was



                                                                 not used to int

erpret



                                                                 this result as



                                                                 normal/abnormal

.



[place] 842952-04-11 11:43:00





             Test Item    Value        Reference Range Interpretation Comments

 

             Basophils # (test code 0.1          See_Comment                [Aut

omated message] The



             = Basophils #)                                        system which 

generated



                                                                 this result tra

nsmitted



                                                                 reference range

: <=0.2.



                                                                 The reference r

zhen was



                                                                 not used to int

erpret



                                                                 this result as



                                                                 normal/abnormal

.



[facility]2022-04-11 11:43:00





             Test Item    Value        Reference Range Interpretation Comments

 

             Glucose Lvl (test code = Glucose Lvl) 418          70-99           

          



[facility]2022-04-11 11:43:00





             Test Item    Value        Reference Range Interpretation Comments

 

             BUN (test code = BUN) 22           7-22                      



[place] 483382-04-11 11:43:00





             Test Item    Value        Reference Range Interpretation Comments

 

             Creatinine Lvl (test code = Creatinine 1.10         0.50-1.40      

           



             Lvl)                                                



[facility]2022-04-11 11:43:00





             Test Item    Value        Reference Range Interpretation Comments

 

             Sodium Lvl (test code = Sodium Lvl) 138          135-145           

        



[place] 483382-04-11 11:43:00





             Test Item    Value        Reference Range Interpretation Comments

 

             Potassium Lvl (test code = Potassium 4.9          3.5-5.1          

         



             Lvl)                                                



[place] 483382-04-11 11:43:00





             Test Item    Value        Reference Range Interpretation Comments

 

             Chloride Lvl (test code = Chloride Lvl) 113                  

            



[facility]2022-04-11 11:43:00





             Test Item    Value        Reference Range Interpretation Comments

 

             CO2 (test code = CO2) 16           24-32                     



[place] 483382-04-11 11:43:00





             Test Item    Value        Reference Range Interpretation Comments

 

             AGAP (test code = AGAP) 13.9         10.0-20.0                 



[place] 483382-04-11 11:43:00





             Test Item    Value        Reference Range Interpretation Comments

 

             Calcium Lvl (test code = Calcium Lvl) 9.0          8.5-10.5        

          



[id number]00





             Test Item    Value        Reference Range Interpretation Comments

 

             eGFR (test code = eGFR) 86                                     



[facility]qbfwlrIPCPDIOMHU4043[facility]89-87 11[facility]43:00





             Test Item    Value        Reference Range Interpretation Comments

 

             WBC (test code = WBC) 10.2         3.7-10.4                  



[facility] BrookGuehepuWWAEJXWVZF5217-33-82 11:43:00





             Test Item    Value        Reference Range Interpretation Comments

 

             RBC (test code = RBC) 5.46         4.70-6.10                 



[facility] BrookBujlifhJNUXPPPJUG2791-48-45 11:43:00





             Test Item    Value        Reference Range Interpretation Comments

 

             Hgb (test code = Hgb) 16.3         14.0-18.0                 



[facility] BrookNzxtsnlPCHJJIQQGH0256-44-95 11:43:00





             Test Item    Value        Reference Range Interpretation Comments

 

             Hct (test code = Hct) 50.0         42.0-54.0                 



[facility] BrookEmmyakqDRGEHVYGPF6131-30-74 11:43:00





             Test Item    Value        Reference Range Interpretation Comments

 

             MCV (test code = MCV) 91.4         80.0-94.0                 



[facility]6123[facility]





             Test Item    Value        Reference Range Interpretation Comments

 

             MCH (test code = MCH) 29.9 pg      27.0-31.0                 



[facility] BrookHxctfhoEIHIRZRWSF9426-91-81 11:43:00





             Test Item    Value        Reference Range Interpretation Comments

 

             MCHC (test code = MCHC) 32.7         32.0-36.0                 



[facility] BrookOejwpzcXMFFZNYKYP7793-80-33 11:43:00





             Test Item    Value        Reference Range Interpretation Comments

 

             RDW (test code = RDW) 15.7         11.5-14.5                 



[facility] BrookVhydexnHMBNNIWXPP9506-61-41 11:43:00





             Test Item    Value        Reference Range Interpretation Comments

 

             Platelet (test code = Platelet) 321          133-450               

    



[facility] BrookTgnxcsoFSMGDSVEDB8690-92-84 11:43:00





             Test Item    Value        Reference Range Interpretation Comments

 

             MPV (test code = MPV) 8.6          7.4-10.4                  



[facility] BrookDqnuavoVPVQTPPRXI5881-22-62 11:43:00





             Test Item    Value        Reference Range Interpretation Comments

 

             Segs (test code = Segs) 92.0         45.0-75.0                 



[place] [address]-04-11 11:43:00





             Test Item    Value        Reference Range Interpretation Comments

 

             Lymphocytes (test code = Lymphocytes) 5.2          20.0-40.0       

          



[place] 842952-04-11 11:43:00





             Test Item    Value        Reference Range Interpretation Comments

 

             Monocytes (test code = Monocytes) 1.6          2.0-12.0            

      



[place] 842952-04-11 11:43:00





             Test Item    Value        Reference Range Interpretation Comments

 

             Basophils (test code = 1.2          See_Comment                [Aut

omated message] The



             Basophils)                                          system which ge

nerated



                                                                 this result tra

nsmitted



                                                                 reference range

: <=1.0.



                                                                 The reference r

zhen was



                                                                 not used to int

erpret



                                                                 this result as



                                                                 normal/abnormal

.



[place] 842952-04-11 11:43:00





             Test Item    Value        Reference Range Interpretation Comments

 

             Neutrophils # (test code = Neutrophils 9.4          1.5-8.1        

           



             #)                                                  



[place] 842952-04-11 11:43:00





             Test Item    Value        Reference Range Interpretation Comments

 

             Lymphocytes # (test code = Lymphocytes 0.5          1.0-5.5        

           



             #)                                                  



[place] [address]-04-11 11:43:00





             Test Item    Value        Reference Range Interpretation Comments

 

             Monocytes # (test code 0.2          See_Comment                [Aut

omated message] The



             = Monocytes #)                                        system which 

generated



                                                                 this result tra

nsmitted



                                                                 reference range

: <=0.8.



                                                                 The reference r

zhen was



                                                                 not used to int

erpret



                                                                 this result as



                                                                 normal/abnormal

.



[place] 842952-04-11 11:43:00





             Test Item    Value        Reference Range Interpretation Comments

 

             Basophils # (test code 0.1          See_Comment                [Aut

omated message] The



             = Basophils #)                                        system which 

generated



                                                                 this result tra

nsmitted



                                                                 reference range

: <=0.2.



                                                                 The reference r

zhen was



                                                                 not used to int

erpret



                                                                 this result as



                                                                 normal/abnormal

.



[place] 483382-04-11 11:43:00





             Test Item    Value        Reference Range Interpretation Comments

 

             Glucose Lvl (test code = Glucose Lvl) 418          70-99           

          



[place] 483382-04-11 11:43:00





             Test Item    Value        Reference Range Interpretation Comments

 

             BUN (test code = BUN) 22           7-22                      



[place] 483382-04-11 11:43:00





             Test Item    Value        Reference Range Interpretation Comments

 

             Creatinine Lvl (test code = Creatinine 1.10         0.50-1.40      

           



             Lvl)                                                



[facility]2022-04-11 11:43:00





             Test Item    Value        Reference Range Interpretation Comments

 

             Sodium Lvl (test code = Sodium Lvl) 138          135-145           

        



[facility]2022-04-11 11:43:00





             Test Item    Value        Reference Range Interpretation Comments

 

             Potassium Lvl (test code = Potassium 4.9          3.5-5.1          

         



             Lvl)                                                



[facility]2022-04-11 11:43:00





             Test Item    Value        Reference Range Interpretation Comments

 

             Chloride Lvl (test code = Chloride Lvl) 113                  

            



[facility]2022-04-11 11:43:00





             Test Item    Value        Reference Range Interpretation Comments

 

             CO2 (test code = CO2) 16           24-32                     



[place] 483382-04-11 11:43:00





             Test Item    Value        Reference Range Interpretation Comments

 

             AGAP (test code = AGAP) 13.9         10.0-20.0                 



[facility]2022-04-11 11:43:00





             Test Item    Value        Reference Range Interpretation Comments

 

             Calcium Lvl (test code = Calcium Lvl) 9.0          8.5-10.5        

          



[facility]2022-04-11 11:43:00





             Test Item    Value        Reference Range Interpretation Comments

 

             eGFR (test code = eGFR) 86                                     



[facility] BrookTwcktagIROUYFXJDJ7647-63-92 11:43:00





             Test Item    Value        Reference Range Interpretation Comments

 

             WBC (test code = WBC) 10.2         3.7-10.4                  



[place] 842952-04-11 11:43:00





             Test Item    Value        Reference Range Interpretation Comments

 

             RBC (test code = RBC) 5.46         4.70-6.10                 



[place] 842952-04-11 11:43:00





             Test Item    Value        Reference Range Interpretation Comments

 

             Hgb (test code = Hgb) 16.3         14.0-18.0                 



[place] 842952-04-11 11:43:00





             Test Item    Value        Reference Range Interpretation Comments

 

             Hct (test code = Hct) 50.0         42.0-54.0                 



[place] 842952-04-11 11:43:00





             Test Item    Value        Reference Range Interpretation Comments

 

             MCV (test code = MCV) 91.4         80.0-94.0                 



[facility] BrookUvnrydhQPETMNLALU5873-02-46 11:43:00





             Test Item    Value        Reference Range Interpretation Comments

 

             MCH (test code = MCH) 29.9 pg      27.0-31.0                 



[facility] BrookZwghursRHVXWEJBIM5819-56-91 11:43:00





             Test Item    Value        Reference Range Interpretation Comments

 

             MCHC (test code = MCHC) 32.7         32.0-36.0                 



[facility] BrookKrhglxwBMRMPBVJJW0725-32-26 11:43:00





             Test Item    Value        Reference Range Interpretation Comments

 

             RDW (test code = RDW) 15.7         11.5-14.5                 



[facility] BrookNhwsreyNIOTBCPAQP4915-08-90 11:43:00





             Test Item    Value        Reference Range Interpretation Comments

 

             Platelet (test code = Platelet) 321          133-450               

    



[facility] BrookMpvhjhyMEBCYMWUTP0477-72-56 11:43:00





             Test Item    Value        Reference Range Interpretation Comments

 

             MPV (test code = MPV) 8.6          7.4-10.4                  



[facility] BrookKmtybgeQDFIUOJVLT7816-28-97 11:43:00





             Test Item    Value        Reference Range Interpretation Comments

 

             Segs (test code = Segs) 92.0         45.0-75.0                 



[facility] BrookTfxlyzsWRYTSUFIQV5577-19-43 11:43:00





             Test Item    Value        Reference Range Interpretation Comments

 

             Lymphocytes (test code = Lymphocytes) 5.2          20.0-40.0       

          



[facility] BrookMdoeqsaFUMHKVRSQZ5366-39-81 11:43:00





             Test Item    Value        Reference Range Interpretation Comments

 

             Monocytes (test code = Monocytes) 1.6          2.0-12.0            

      



[facility] BrookTffmtlcJDFJUSSIEZ4140-48-99 11:43:00





             Test Item    Value        Reference Range Interpretation Comments

 

             Basophils (test code = 1.2          See_Comment                [Aut

omated message] The



             Basophils)                                          system which ge

nerated



                                                                 this result tra

nsmitted



                                                                 reference range

: <=1.0.



                                                                 The reference r

zhen was



                                                                 not used to int

erpret



                                                                 this result as



                                                                 normal/abnormal

.



[facility] BrookHxnknweWNWTCXAXJS5409-12-23 11:43:00





             Test Item    Value        Reference Range Interpretation Comments

 

             Neutrophils # (test code = Neutrophils 9.4          1.5-8.1        

           



             #)                                                  



[facility] BrookRczhsjyKPVUEACMYB6564-11-74 11:43:00





             Test Item    Value        Reference Range Interpretation Comments

 

             Lymphocytes # (test code = Lymphocytes 0.5          1.0-5.5        

           



             #)                                                  



[facility] BrookQxmzffzUPHVDUUONI1557-39-40 11:43:00





             Test Item    Value        Reference Range Interpretation Comments

 

             Monocytes # (test code 0.2          See_Comment                [Aut

omated message] The



             = Monocytes #)                                        system which 

generated



                                                                 this result tra

nsmitted



                                                                 reference range

: <=0.8.



                                                                 The reference r

zhen was



                                                                 not used to int

erpret



                                                                 this result as



                                                                 normal/abnormal

.



[facility] BrookXakdejvSVOCPASFTD7347-13-46 11:43:00





             Test Item    Value        Reference Range Interpretation Comments

 

             Basophils # (test code 0.1          See_Comment                [Aut

omated message] The



             = Basophils #)                                        system which 

generated



                                                                 this result tra

nsmitted



                                                                 reference range

: <=0.2.



                                                                 The reference r

zhen was



                                                                 not used to int

erpret



                                                                 this result as



                                                                 normal/abnormal

.



[facility] YLSKT4363[facility]59-86 [facility]43:[facility]





             Test Item    Value        Reference Range Interpretation Comments

 

             Glucose Lvl (test code = Glucose Lvl) 418          70-99           

          



[facility] CenterInn[facility] IYTJF0209-72-87 11:43:00





             Test Item    Value        Reference Range Interpretation Comments

 

             BUN (test code = BUN) 22           7-22                      



[facility] CenterInn[facility] EJPFX7694-57-81 11:43:00





             Test Item    Value        Reference Range Interpretation Comments

 

             Creatinine Lvl (test code = Creatinine 1.10         0.50-1.40      

           



             Lvl)                                                



[facility] [id number] 11:43:00





             Test Item    Value        Reference Range Interpretation Comments

 

             Sodium Lvl (test code = Sodium Lvl) 138          135-145           

        



[facility]2022-04-11 11:43:00





             Test Item    Value        Reference Range Interpretation Comments

 

             Potassium Lvl (test code = Potassium 4.9          3.5-5.1          

         



             Lvl)                                                



[facility]In[facility] ITLDI4004-07-24 11:43:00





             Test Item    Value        Reference Range Interpretation Comments

 

             Chloride Lvl (test code = Chloride Lvl) 113                  

            



[facility] TRYGN6122-90-03 11:43:00





             Test Item    Value        Reference Range Interpretation Comments

 

             CO2 (test code = CO2) 16           24-32                     



[facility] PVXWC1279-71-21 11:43:00





             Test Item    Value        Reference Range Interpretation Comments

 

             AGAP (test code = AGAP) 13.9         10.0-20.0                 



[facility] PFPGL5158-35-34 11:43:00





             Test Item    Value        Reference Range Interpretation Comments

 

             Calcium Lvl (test code = Calcium Lvl) 9.0          8.5-10.5        

          



[facility]2022-04-11 11:43:00





             Test Item    Value        Reference Range Interpretation Comments

 

             eGFR (test code = eGFR) 86                                     



[facility] BrookYjxocdgIRAMNMFTYG7438-45-33 11:43:00





             Test Item    Value        Reference Range Interpretation Comments

 

             WBC (test code = WBC) 10.2         3.7-10.4                  



[facility] BrookHodaiobEPQDIAJQKM6407-60-03 11:43:00





             Test Item    Value        Reference Range Interpretation Comments

 

             RBC (test code = RBC) 5.46         4.70-6.10                 



[facility] BrookZhmizumJELMRPTLSA1698-63-04 11:43:00





             Test Item    Value        Reference Range Interpretation Comments

 

             Hgb (test code = Hgb) 16.3         14.0-18.0                 



[facility] BrookAknbvpnJEFZHDZZAO2086-96-44 11:43:00





             Test Item    Value        Reference Range Interpretation Comments

 

             Hct (test code = Hct) 50.0         42.0-54.0                 



[facility] BrookWhcqlicLSSQSNFCAF5027-42-57 11:43:00





             Test Item    Value        Reference Range Interpretation Comments

 

             MCV (test code = MCV) 91.4         80.0-94.0                 



[facility] BrookZfqgizlMNTXQKHNBO8499-94-52 11:43:00





             Test Item    Value        Reference Range Interpretation Comments

 

             MCH (test code = MCH) 29.9 pg      27.0-31.0                 



[facility] BrookVlcbzslQFKSITUSAL0136-19-80 11:43:00





             Test Item    Value        Reference Range Interpretation Comments

 

             MCHC (test code = MCHC) 32.7         32.0-36.0                 



[facility] BrookFfjcomyFFDMEAHMEI7603-21-63 11:43:00





             Test Item    Value        Reference Range Interpretation Comments

 

             RDW (test code = RDW) 15.7         11.5-14.5                 



[facility] BrookWxegexuFNZAQHWGEA2593-41-08 11:43:00





             Test Item    Value        Reference Range Interpretation Comments

 

             Platelet (test code = Platelet) 321          133-450               

    



[facility] BrookJzifsqyBWVGYORMRC8277-76-14 11:43:00





             Test Item    Value        Reference Range Interpretation Comments

 

             MPV (test code = MPV) 8.6          7.4-10.4                  



[facility] BrookZngzsrcLQENVUJDVH8710-02-71 11:43:00





             Test Item    Value        Reference Range Interpretation Comments

 

             Segs (test code = Segs) 92.0         45.0-75.0                 



[facility] BrookTpfsetxXWYESEWSWP6153-05-43 11:43:00





             Test Item    Value        Reference Range Interpretation Comments

 

             Lymphocytes (test code = Lymphocytes) 5.2          20.0-40.0       

          



[place] 842952-04-11 11:43:00





             Test Item    Value        Reference Range Interpretation Comments

 

             Monocytes (test code = Monocytes) 1.6          2.0-12.0            

      



[place] 842952-04-11 11:43:00





             Test Item    Value        Reference Range Interpretation Comments

 

             Basophils (test code = 1.2          See_Comment                [Aut

omated message] The



             Basophils)                                          system which ge

nerated



                                                                 this result tra

nsmitted



                                                                 reference range

: <=1.0.



                                                                 The reference r

zhen was



                                                                 not used to int

erpret



                                                                 this result as



                                                                 normal/abnormal

.



[place]2952-04-11 11:43:00





             Test Item    Value        Reference Range Interpretation Comments

 

             Neutrophils # (test code = Neutrophils 9.4          1.5-8.1        

           



             #)                                                  



[place] [address]-04-11 11:43:00





             Test Item    Value        Reference Range Interpretation Comments

 

             Lymphocytes # (test code = Lymphocytes 0.5          1.0-5.5        

           



             #)                                                  



[place] 842952-04-11 11:43:00





             Test Item    Value        Reference Range Interpretation Comments

 

             Monocytes # (test code 0.2          See_Comment                [Aut

omated message] The



             = Monocytes #)                                        system which 

generated



                                                                 this result tra

nsmitted



                                                                 reference range

: <=0.8.



                                                                 The reference r

zhen was



                                                                 not used to int

erpret



                                                                 this result as



                                                                 normal/abnormal

.



[place] 842952-04-11 11:43:00





             Test Item    Value        Reference Range Interpretation Comments

 

             Basophils # (test code 0.1          See_Comment                [Aut

omated message] The



             = Basophils #)                                        system which 

generated



                                                                 this result tra

nsmitted



                                                                 reference range

: <=0.2.



                                                                 The reference r

zhen was



                                                                 not used to int

erpret



                                                                 this result as



                                                                 normal/abnormal

.



[place] 483382-04-11 11:43:00





             Test Item    Value        Reference Range Interpretation Comments

 

             Glucose Lvl (test code = Glucose Lvl) 418          70-99           

          



[place] 483382-04-11 11:43:00





             Test Item    Value        Reference Range Interpretation Comments

 

             BUN (test code = BUN) 22           7-22                      



[place] 483382-04-11 11:43:00





             Test Item    Value        Reference Range Interpretation Comments

 

             Creatinine Lvl (test code = Creatinine 1.10         0.50-1.40      

           



             Lvl)                                                



[facility]2022-04-11 11:43:00





             Test Item    Value        Reference Range Interpretation Comments

 

             Sodium Lvl (test code = Sodium Lvl) 138          135-145           

        



[facility]2022-04-11 11:43:00





             Test Item    Value        Reference Range Interpretation Comments

 

             Potassium Lvl (test code = Potassium 4.9          3.5-5.1          

         



             Lvl)                                                



[facility]2022-04-11 11:43:00





             Test Item    Value        Reference Range Interpretation Comments

 

             Chloride Lvl (test code = Chloride Lvl) 113                  

            



[place] 483382-04-11 11:43:00





             Test Item    Value        Reference Range Interpretation Comments

 

             CO2 (test code = CO2) 16           24-32                     



[place] 483382-04-11 11:43:00





             Test Item    Value        Reference Range Interpretation Comments

 

             AGAP (test code = AGAP) 13.9         10.0-20.0                 



[place] 483382-04-11 11:43:00





             Test Item    Value        Reference Range Interpretation Comments

 

             Calcium Lvl (test code = Calcium Lvl) 9.0          8.5-10.5        

          



[facility]2022-04-11 11:43:00





             Test Item    Value        Reference Range Interpretation Comments

 

             eGFR (test code = eGFR) 86                                     



[facility]





             Test Item    Value        Reference Range Interpretation Comments

 

             WBC (test code = WBC) 10.2         3.7-10.4                  



[place] 842952-04-11 11:43:00





             Test Item    Value        Reference Range Interpretation Comments

 

             RBC (test code = RBC) 5.46         4.70-6.10                 



[place] 842952-04-11 11:43:00





             Test Item    Value        Reference Range Interpretation Comments

 

             Hgb (test code = Hgb) 16.3         14.0-18.0                 



[place] [address]-04-11 11:43:00





             Test Item    Value        Reference Range Interpretation Comments

 

             Hct (test code = Hct) 50.0         42.0-54.0                 



[place] 842952-04-11 11:43:00





             Test Item    Value        Reference Range Interpretation Comments

 

             MCV (test code = MCV) 91.4         80.0-94.0                 



[place] 842952-04-11 11:43:00





             Test Item    Value        Reference Range Interpretation Comments

 

             MCH (test code = MCH) 29.9 pg      27.0-31.0                 



[facility]lqjrjoKECUTPKYQI3131-19-81 11:43:00





             Test Item    Value        Reference Range Interpretation Comments

 

             MCHC (test code = MCHC) 32.7         32.0-36.0                 



[facility] BrookBsmxibrJLCJZDRJWG0457-15-32 11:43:00





             Test Item    Value        Reference Range Interpretation Comments

 

             RDW (test code = RDW) 15.7         11.5-14.5                 



[facility] BrookTetomvuHHPZBZQUJW6025-66-05 11:43:00





             Test Item    Value        Reference Range Interpretation Comments

 

             Platelet (test code = Platelet) 321          133-450               

    



[facility] BrookJronlkzCBYXGWPDHC6147-94-37 11:43:00





             Test Item    Value        Reference Range Interpretation Comments

 

             MPV (test code = MPV) 8.6          7.4-10.4                  



[facility] BrookGifuxcjCWXZMOKPEJ3115-96-36 11:43:00





             Test Item    Value        Reference Range Interpretation Comments

 

             Segs (test code = Segs) 92.0         45.0-75.0                 



[facility] BrookNxdchcmVVOLMQWVXP6241-52-54 11:43:00





             Test Item    Value        Reference Range Interpretation Comments

 

             Lymphocytes (test code = Lymphocytes) 5.2          20.0-40.0       

          



[facility] BrookQitxgthOBJDVYYRDO6618-90-24 11:43:00





             Test Item    Value        Reference Range Interpretation Comments

 

             Monocytes (test code = Monocytes) 1.6          2.0-12.0            

      



[facility] BrookZqrkemdCEHNIPYMPG0443-56-14 11:43:00





             Test Item    Value        Reference Range Interpretation Comments

 

             Basophils (test code = 1.2          See_Comment                [Aut

omated message] The



             Basophils)                                          system which ge

nerated



                                                                 this result tra

nsmitted



                                                                 reference range

: <=1.0.



                                                                 The reference r

zhen was



                                                                 not used to int

erpret



                                                                 this result as



                                                                 normal/abnormal

.



[facility] BrookHhrclbhSRSVVNGPFB4652-30-32 11:43:00





             Test Item    Value        Reference Range Interpretation Comments

 

             Neutrophils # (test code = Neutrophils 9.4          1.5-8.1        

           



             #)                                                  



[facility] BrookSwwwzztDZLCCDINYW9081-04-27 11:43:00





             Test Item    Value        Reference Range Interpretation Comments

 

             Lymphocytes # (test code = Lymphocytes 0.5          1.0-5.5        

           



             #)                                                  



[facility] BrookKxkexurSJCHOKZUBT8241-88-58 11:43:00





             Test Item    Value        Reference Range Interpretation Comments

 

             Monocytes # (test code 0.2          See_Comment                [Aut

omated message] The



             = Monocytes #)                                        system which 

generated



                                                                 this result tra

nsmitted



                                                                 reference range

: <=0.8.



                                                                 The reference r

zhen was



                                                                 not used to int

erpret



                                                                 this result as



                                                                 normal/abnormal

.



[facility] BrookGomprftOOXLOFDCVJ7748-61-85 11:43:00





             Test Item    Value        Reference Range Interpretation Comments

 

             Basophils # (test code 0.1          See_Comment                [Aut

omated message] The



             = Basophils #)                                        system which 

generated



                                                                 this result tra

nsmitted



                                                                 reference range

: <=0.2.



                                                                 The reference r

zhen was



                                                                 not used to int

erpret



                                                                 this result as



                                                                 normal/abnormal

.



[facility]2022-04-11 11:43:00





             Test Item    Value        Reference Range Interpretation Comments

 

             Glucose Lvl (test code = Glucose Lvl) 418          70-99           

          



[facility]2022-04-11 11:43:00





             Test Item    Value        Reference Range Interpretation Comments

 

             BUN (test code = BUN) 22           7-22                      



[facility]2022-04-11 11:43:00





             Test Item    Value        Reference Range Interpretation Comments

 

             Creatinine Lvl (test code = Creatinine 1.10         0.50-1.40      

           



             Lvl)                                                



[facility]2022-04-11 11:43:00





             Test Item    Value        Reference Range Interpretation Comments

 

             Sodium Lvl (test code = Sodium Lvl) 138          135-145           

        



[facility]2022-04-11 11:43:00





             Test Item    Value        Reference Range Interpretation Comments

 

             Potassium Lvl (test code = Potassium 4.9          3.5-5.1          

         



             Lvl)                                                



[facility]2022-04-11 11:43:00





             Test Item    Value        Reference Range Interpretation Comments

 

             Chloride Lvl (test code = Chloride Lvl) 113                  

            



[facility]2022-04-11 11:43:00





             Test Item    Value        Reference Range Interpretation Comments

 

             CO2 (test code = CO2) 16           24-32                     



[facility] JCFMQ0826[facility]57-89 11:43:00





             Test Item    Value        Reference Range Interpretation Comments

 

             AGAP (test code = AGAP) 13.9         10.0-20.0                 



[place] 483382-04-11 11:43:00





             Test Item    Value        Reference Range Interpretation Comments

 

             Calcium Lvl (test code = Calcium Lvl) 9.0          8.5-10.5        

          



[facility] PSOUA6840[facility]70[facility]43:00





             Test Item    Value        Reference Range Interpretation Comments

 

             eGFR (test code = eGFR) 86                                     



[facility] BrookKgnnkpfMCCHVHPXUI3943-35-46 11:43:00





             Test Item    Value        Reference Range Interpretation Comments

 

             WBC (test code = WBC) 10.2         3.7-10.4                  



[facility] BrookAhyayasJERZPPCJIO9949-75-85 11:43:00





             Test Item    Value        Reference Range Interpretation Comments

 

             RBC (test code = RBC) 5.46         4.70-6.10                 



[facility] BrookGsclhnlLNYKDKVSEC6731-36-91 11:43:00





             Test Item    Value        Reference Range Interpretation Comments

 

             Hgb (test code = Hgb) 16.3         14.0-18.0                 



[facility] BrookWvulknuZJRGEDYHSQ4293-27-50 11:43:00





             Test Item    Value        Reference Range Interpretation Comments

 

             Hct (test code = Hct) 50.0         42.0-54.0                 



[facility] BrookHkxazqlDGGNHCAFOK9072-98-48 11:43:00





             Test Item    Value        Reference Range Interpretation Comments

 

             MCV (test code = MCV) 91.4         80.0-94.0                 



[facility] BrookMcxkbeqWOMVCZYBVX1624-56-91 11:43:00





             Test Item    Value        Reference Range Interpretation Comments

 

             MCH (test code = MCH) 29.9 pg      27.0-31.0                 



[facility] BrookPqrrughRVAAZGOXNS5672-75-18 11:43:00





             Test Item    Value        Reference Range Interpretation Comments

 

             MCHC (test code = MCHC) 32.7         32.0-36.0                 



[facility] BrookJouksqqOMTLMBGDTL9489-20-11 11:43:00





             Test Item    Value        Reference Range Interpretation Comments

 

             RDW (test code = RDW) 15.7         11.5-14.5                 



[facility] BrookGncefxjEMGPHXNKVU2029-03-71 11:43:00





             Test Item    Value        Reference Range Interpretation Comments

 

             Platelet (test code = Platelet) 321          133-450               

    



[facility] BrookDixbxwsKIPSCBTAGE7178-74-93 11:43:00





             Test Item    Value        Reference Range Interpretation Comments

 

             MPV (test code = MPV) 8.6          7.4-10.4                  



[facility] BrookDsndzjxSYIUNGTQNN5828-16-77 11:43:00





             Test Item    Value        Reference Range Interpretation Comments

 

             Segs (test code = Segs) 92.0         45.0-75.0                 



[facility] BrookTcvncxwJDLMPZXSWT4387-85-82 11:43:00





             Test Item    Value        Reference Range Interpretation Comments

 

             Lymphocytes (test code = Lymphocytes) 5.2          20.0-40.0       

          



[place] 842952-04-11 11:43:00





             Test Item    Value        Reference Range Interpretation Comments

 

             Monocytes (test code = Monocytes) 1.6          2.0-12.0            

      



[place] 842952-04-11 11:43:00





             Test Item    Value        Reference Range Interpretation Comments

 

             Basophils (test code = 1.2          See_Comment                [Aut

omated message] The



             Basophils)                                          system which ge

nerated



                                                                 this result tra

nsmitted



                                                                 reference range

: <=1.0.



                                                                 The reference r

zhen was



                                                                 not used to int

erpret



                                                                 this result as



                                                                 normal/abnormal

.



[place] 842952-04-11 11:43:00





             Test Item    Value        Reference Range Interpretation Comments

 

             Neutrophils # (test code = Neutrophils 9.4          1.5-8.1        

           



             #)                                                  



[place] 842952-04-11 11:43:00





             Test Item    Value        Reference Range Interpretation Comments

 

             Lymphocytes # (test code = Lymphocytes 0.5          1.0-5.5        

           



             #)                                                  



[place] 842952-04-11 11:43:00





             Test Item    Value        Reference Range Interpretation Comments

 

             Monocytes # (test code 0.2          See_Comment                [Aut

omated message] The



             = Monocytes #)                                        system which 

generated



                                                                 this result tra

nsmitted



                                                                 reference range

: <=0.8.



                                                                 The reference r

zhen was



                                                                 not used to int

erpret



                                                                 this result as



                                                                 normal/abnormal

.



[place] 842952-04-11 11:43:00





             Test Item    Value        Reference Range Interpretation Comments

 

             Basophils # (test code 0.1          See_Comment                [Aut

omated message] The



             = Basophils #)                                        system which 

generated



                                                                 this result tra

nsmitted



                                                                 reference range

: <=0.2.



                                                                 The reference r

zhen was



                                                                 not used to int

erpret



                                                                 this result as



                                                                 normal/abnormal

.



[facility]VsofhbnHCMZKYRVDJ8349[facility]47-40 [facility]





             Test Item    Value        Reference Range Interpretation Comments

 

             Coronavirus (COVID-19) Not Detected (22                        

   



             ZEYAD (test code = 4:18 AM)                               



             Coronavirus (COVID-19)                                        



             ZEYAD)                                                



[place] [address]-04-09 09:18:00





             Test Item    Value        Reference Range Interpretation Comments

 

             Coronavirus (COVID-19) Not Detected (22                        

   



             ZEYAD (test code = 4:18 AM)                               



             Coronavirus (COVID-19)                                        



             ZEYAD)                                                



[place] 735922-04-09 09:18:00





             Test Item    Value        Reference Range Interpretation Comments

 

             Coronavirus (COVID-19) Not Detected (22                        

   



             ZEYAD (test code = 4:18 AM)                               



             Coronavirus (COVID-19)                                        



             ZEYAD)                                                



[facility] HospitalQyugtueYPAJCURAZS1351-06-19 09:18:00





             Test Item    Value        Reference Range Interpretation Comments

 

             Coronavirus (COVID-19) Not Detected (22                        

   



             ZEYAD (test code = 4:18 AM)                               



             Coronavirus (COVID-19)                                        



             ZEYAD)                                                



[facility] HospitalPzsvppfCDNRCRWTPL3392-41-84 09:18:00





             Test Item    Value        Reference Range Interpretation Comments

 

             Coronavirus (COVID-19) Not Detected (22                        

   



             ZEYAD (test code = 4:18 AM)                               



             Coronavirus (COVID-19)                                        



             ZEYAD)                                                



[facility] HospitalXrwhxuwIWIYURYAQF2359-03-94 09:18:00





             Test Item    Value        Reference Range Interpretation Comments

 

             Coronavirus (COVID-19) Not Detected (22                        

   



             ZEYAD (test code = 4:18 AM)                               



             Coronavirus (COVID-19)                                        



             ZEYAD)                                                



[facility]:SUSC:PT:ISOLATE:ORDQN:[id number] 08:19:00





             Test Item    Value        Reference Range Interpretation Comments

 

             Culture: Urine (test >100,000 CFU/mL                           



             code = Culture: Providencia stuartii                           



             Urine)                                              



[facility]:SUSC:PT:ISOLATE:ORDQN:[id number] 08:19:00





             Test Item    Value        Reference Range Interpretation Comments

 

             Providencia stuartii Providencia stuartii                          

 



             (test code = Providencia                                        



             stuartii)                                           



[facility] AND [id number] 08:19:00





             Test Item    Value        Reference Range Interpretation Comments

 

             UA Color (test code = Yellow *NA*(22 3:19                      

     



             UA Color)    AM)                                    



[facility] AND [id number] 08:19:00





             Test Item    Value        Reference Range Interpretation Comments

 

             UA Turbidity (test code Marked *ABN*(22                        

   



             = UA Turbidity) 3:19 AM)                               



[facility] [id number] 08:19:00





             Test Item    Value        Reference Range Interpretation Comments

 

             UA Spec Grav (test code = UA Spec 1.013 1                          

      



             Grav)                                               



[facility] AND [id number] 08:19:00





             Test Item    Value        Reference Range Interpretation Comments

 

             UA pH (test code = UA pH) 5.0 1        5.0-8.0                   



[facility] AND [id number] 08:19:00





             Test Item    Value        Reference Range Interpretation Comments

 

             UA Protein (test code = UA Negative mg/dL                          

 



             Protein)                                            



[facility] AND [id number] 08:19:00





             Test Item    Value        Reference Range Interpretation Comments

 

             UA Glucose (test code = UA Negative mg/dL                          

 



             Glucose)                                            



[facility] AND [id number] 08:19:00





             Test Item    Value        Reference Range Interpretation Comments

 

             UA Ketones (test code = UA Negative mg/dL                          

 



             Ketones)                                            



[facility] AND [id number] 08:19:00





             Test Item    Value        Reference Range Interpretation Comments

 

             UA Bili (test code = Negative *NA*(22                          

 



             UA Bili)     3:19 AM)                               



[facility] AND [id number] 08:19:00





             Test Item    Value        Reference Range Interpretation Comments

 

             UA Blood (test code = Small *ABN*(22                           



             UA Blood)    3:19 AM)                               



[facility] AND [id number] 08:19:00





             Test Item    Value        Reference Range Interpretation Comments

 

             UA Urobilinogen (test code = UA no gt        0.1-1.0               

    



             Urobilinogen)                                        



[facility] AND [id number] 08:19:00





             Test Item    Value        Reference Range Interpretation Comments

 

             UA Nitrite (test code Positive *ABN*(22                        

   



             = UA Nitrite) 3:19 AM)                               



[facility] AND [id number] 08:19:00





             Test Item    Value        Reference Range Interpretation Comments

 

             UA Leuk Est (test code Large *ABN*(22 3:19                     

      



             = UA Leuk Est) AM)                                    



[facility] AND [id number] 08:19:00





             Test Item    Value        Reference Range Interpretation Comments

 

             UA WBC (test code = no gt        See_Comment                [Automa

huma message] The



             UA WBC)                                             system which ge

nerated this



                                                                 result transmit

huma



                                                                 reference range

: <=5. The



                                                                 reference range

 was not



                                                                 used to interpr

et this



                                                                 result as zayda

l/abnormal.



[facility] AND [id number] 08:19:00





             Test Item    Value        Reference Range Interpretation Comments

 

             UA RBC (test code = 8            See_Comment                [Automa

huma message] The



             UA RBC)                                             system which ge

nerated this



                                                                 result transmit

huma



                                                                 reference range

: <=2. The



                                                                 reference range

 was not



                                                                 used to interpr

et this



                                                                 result as zayda

l/abnormal.



[facility] [id number] 08[facility]19:00





             Test Item    Value        Reference Range Interpretation Comments

 

             UA Bacteria (test code = UA Occasional /HPF                        

   



             Bacteria)                                           



[facility] [id number] 08:19:00





             Test Item    Value        Reference Range Interpretation Comments

 

             UA Mucus (test code = UA Mucus) Few /LPF                           

    



[facility] [id number] 08:19:00





             Test Item    Value        Reference Range Interpretation Comments

 

             UA Amorph Ivonne (test code = Occasional /HPF                        

   



             UA Amorph Ivonne)                                        



[facility] [id number] 08:19:00





             Test Item    Value        Reference Range Interpretation Comments

 

             UA Sq Epi (test code = UA Sq Epi) None Seen                        

      



[facility] [id number] [facility]:00





             Test Item    Value        Reference Range Interpretation Comments

 

             Culture: Urine (test Holding For Better                           



             code = Culture: Urine) Growth                                 



[facility]FTRIAXONE:SUSC:PT:ISOLATE:ORDQN:[id number] 08:19:00





             Test Item    Value        Reference Range Interpretation Comments

 

             Culture: Urine (test >100,000 CFU/mL                           



             code = Culture: Providencia stuartii                           



             Urine)                                              



[facility]USC:PT:ISOLATE:ORDQN:[id number] 08:19:00





             Test Item    Value        Reference Range Interpretation Comments

 

             Providencia stuartii Providencia stuartii                          

 



             (test code = Providencia                                        



             stuartii)                                           



[facility] [id number] 08:19:00





             Test Item    Value        Reference Range Interpretation Comments

 

             UA Color (test code = Yellow *NA*(22 3:19                      

     



             UA Color)    AM)                                    



[facility]XONE:SUSC:PT:ISOLATE:ORDQN:[id number] 08:19:00





             Test Item    Value        Reference Range Interpretation Comments

 

             Culture: Urine (test >100,000 CFU/mL                           



             code = Culture: Providencia stuartii                           



             Urine)                                              



[facility] [id number] 08:19:00





             Test Item    Value        Reference Range Interpretation Comments

 

             UA Turbidity (test code Marked *ABN*(22                        

   



             = UA Turbidity) 3:19 AM)                               



[facility]RIAXONE[facility]SUSC:PT:ISOLATE:ORDQN:[id number] 08:19:00





             Test Item    Value        Reference Range Interpretation Comments

 

             Providencia stuartii Providencia stuartii                          

 



             (test code = Providencia                                        



             stuartii)                                           



[facility] [id number] 08:19:00





             Test Item    Value        Reference Range Interpretation Comments

 

             UA Color (test code = Yellow *NA*(22 3:19                      

     



             UA Color)    AM)                                    



[facility] AND [id number] 08:19:00





             Test Item    Value        Reference Range Interpretation Comments

 

             UA Turbidity (test code Marked *ABN*(22                        

   



             = UA Turbidity) 3:19 AM)                               



[facility] AND [id number] 08:19:00





             Test Item    Value        Reference Range Interpretation Comments

 

             UA Spec Grav (test code = UA Spec 1.013 1                          

      



             Grav)                                               



[facility] [id number] 08:19:00





             Test Item    Value        Reference Range Interpretation Comments

 

             UA pH (test code = UA pH) 5.0 1        5.0-8.0                   



[facility] [id number] 08:19:00





             Test Item    Value        Reference Range Interpretation Comments

 

             UA Protein (test code = UA Negative mg/dL                          

 



             Protein)                                            



[facility] [id number] 08:19:00





             Test Item    Value        Reference Range Interpretation Comments

 

             UA Glucose (test code = UA Negative mg/dL                          

 



             Glucose)                                            



[facility] [id number] 08:19:00





             Test Item    Value        Reference Range Interpretation Comments

 

             UA Ketones (test code = UA Negative mg/dL                          

 



             Ketones)                                            



[facility] [id number] 08:19:00





             Test Item    Value        Reference Range Interpretation Comments

 

             UA Bili (test code = Negative *NA*(22                          

 



             UA Bili)     3:19 AM)                               



[facility] [id number] 08:19:00





             Test Item    Value        Reference Range Interpretation Comments

 

             UA Blood (test code = Small *ABN*(22                           



             UA Blood)    3:19 AM)                               



[facility] [id number] 08:19:00





             Test Item    Value        Reference Range Interpretation Comments

 

             UA Spec Grav (test code = UA Spec 1.013 1                          

      



             Grav)                                               



[facility] [id number] 08:19:00





             Test Item    Value        Reference Range Interpretation Comments

 

             UA Urobilinogen (test code = UA no gt        0.1-1.0               

    



             Urobilinogen)                                        



[facility] [id number] 08:19:00





             Test Item    Value        Reference Range Interpretation Comments

 

             UA Nitrite (test code Positive *ABN*(22                        

   



             = UA Nitrite) 3:19 AM)                               



[facility] [id number] 08:19:00





             Test Item    Value        Reference Range Interpretation Comments

 

             UA Leuk Est (test code Large *ABN*(22 3:19                     

      



             = UA Leuk Est) AM)                                    



[facility] [id number] 08:19:00





             Test Item    Value        Reference Range Interpretation Comments

 

             UA WBC (test code = no gt        See_Comment                [Automa

huma message] The



             UA WBC)                                             system which ge

nerated this



                                                                 result transmit

huma



                                                                 reference range

: <=5. The



                                                                 reference range

 was not



                                                                 used to interpr

et this



                                                                 result as zayda

l/abnormal.



[facility] [id number] 08:19:00





             Test Item    Value        Reference Range Interpretation Comments

 

             UA RBC (test code = 8            See_Comment                [Automa

huma message] The



             UA RBC)                                             system which ge

nerated this



                                                                 result transmit

huma



                                                                 reference range

: <=2. The



                                                                 reference range

 was not



                                                                 used to interpr

et this



                                                                 result as zayda

l/abnormal.



[facility] [id number] 08:19:00





             Test Item    Value        Reference Range Interpretation Comments

 

             UA Bacteria (test code = UA Occasional /HPF                        

   



             Bacteria)                                           



[facility] [id number] 08:19:00





             Test Item    Value        Reference Range Interpretation Comments

 

             UA Mucus (test code = UA Mucus) Few /LPF                           

    



[facility] [id number] 08:19:00





             Test Item    Value        Reference Range Interpretation Comments

 

             UA Amorph Ivonne (test code = Occasional /HPF                        

   



             UA Amorph Ivonne)                                        



[facility] [id number] 08:19:00





             Test Item    Value        Reference Range Interpretation Comments

 

             UA Sq Epi (test code = UA Sq Epi) None Seen                        

      



[facility] [id number] 08:19:00





             Test Item    Value        Reference Range Interpretation Comments

 

             Culture: Urine (test Holding For Better                           



             code = Culture: Urine) Growth                                 



[facility] AND [id number] 08:19:00





             Test Item    Value        Reference Range Interpretation Comments

 

             UA pH (test code = UA pH) 5.0 1        5.0-8.0                   



[facility] AND [id number] 08:19:00





             Test Item    Value        Reference Range Interpretation Comments

 

             UA Protein (test code = UA Negative mg/dL                          

 



             Protein)                                            



[facility] AND [id number] 08:19:00





             Test Item    Value        Reference Range Interpretation Comments

 

             UA Glucose (test code = UA Negative mg/dL                          

 



             Glucose)                                            



[facility] AND [id number] 08:19:00





             Test Item    Value        Reference Range Interpretation Comments

 

             UA Ketones (test code = UA Negative mg/dL                          

 



             Ketones)                                            



[facility] AND [id number] 08:19:00





             Test Item    Value        Reference Range Interpretation Comments

 

             UA Bili (test code = Negative *NA*(22                          

 



             UA Bili)     3:19 AM)                               



[facility] AND [id number] 08:19:00





             Test Item    Value        Reference Range Interpretation Comments

 

             UA Blood (test code = Small *ABN*(22                           



             UA Blood)    3:19 AM)                               



[facility] AND [id number] 08:19:00





             Test Item    Value        Reference Range Interpretation Comments

 

             UA Urobilinogen (test code = UA no gt        0.1-1.0               

    



             Urobilinogen)                                        



[facility] AND [id number] 08:19:00





             Test Item    Value        Reference Range Interpretation Comments

 

             UA Nitrite (test code Positive *ABN*(22                        

   



             = UA Nitrite) 3:19 AM)                               



[facility] AND [id number] 08:19:00





             Test Item    Value        Reference Range Interpretation Comments

 

             UA Leuk Est (test code Large *ABN*(22 3:19                     

      



             = UA Leuk Est) AM)                                    



[facility] AND [id number] 08:19:00





             Test Item    Value        Reference Range Interpretation Comments

 

             UA WBC (test code = no gt        See_Comment                [Automa

huma message] The



             UA WBC)                                             system which ge

nerated this



                                                                 result transmit

huma



                                                                 reference range

: <=5. The



                                                                 reference range

 was not



                                                                 used to interpr

et this



                                                                 result as zayda

l/abnormal.



[facility] [id number] 08:19:00





             Test Item    Value        Reference Range Interpretation Comments

 

             UA RBC (test code = 8            See_Comment                [Automa

huma message] The



             UA RBC)                                             system which ge

nerated this



                                                                 result transmit

huma



                                                                 reference range

: <=2. The



                                                                 reference range

 was not



                                                                 used to interpr

et this



                                                                 result as zayda

l/abnormal.



[facility] [id number] 08:19:00





             Test Item    Value        Reference Range Interpretation Comments

 

             UA Bacteria (test code = UA Occasional /HPF                        

   



             Bacteria)                                           



[facility] [id number] 08:19:00





             Test Item    Value        Reference Range Interpretation Comments

 

             UA Mucus (test code = UA Mucus) Few /LPF                           

    



[facility] [id number] 08:19:00





             Test Item    Value        Reference Range Interpretation Comments

 

             UA Amorph Ivonne (test code = Occasional /HPF                        

   



             UA Amorph Ivonne)                                        



[facility] [id number] 08:19:00





             Test Item    Value        Reference Range Interpretation Comments

 

             UA Sq Epi (test code = UA Sq Epi) None Seen                        

      



[facility] [id number] 08:19:00





             Test Item    Value        Reference Range Interpretation Comments

 

             Culture: Urine (test Holding For Better                           



             code = Culture: Urine) Growth                                 



[facility]annCEFTRIAXONE:SUSC:PT:ISOLATE:ORDQN:[id number] 08:19:00





             Test Item    Value        Reference Range Interpretation Comments

 

             Culture: Urine (test >100,000 CFU/mL                           



             code = Culture: Providencia stuartii                           



             Urine)                                              



[facility]:SUSC:PT:ISOLATE:ORDQN:[id number] 08:19:00





             Test Item    Value        Reference Range Interpretation Comments

 

             Providencia stuartii Providencia stuartii                          

 



             (test code = Providencia                                        



             stuartii)                                           



[facility] [id number] 08:19:00





             Test Item    Value        Reference Range Interpretation Comments

 

             UA Color (test code = Yellow *NA*(22 3:19                      

     



             UA Color)    AM)                                    



[facility] [id number] 08:19:00





             Test Item    Value        Reference Range Interpretation Comments

 

             UA Turbidity (test code Marked *ABN*(22                        

   



             = UA Turbidity) 3:19 AM)                               



[facility] [id number] 08:19:00





             Test Item    Value        Reference Range Interpretation Comments

 

             UA Spec Grav (test code = UA Spec 1.013 1                          

      



             Grav)                                               



[facility] AND [id number] 08:19:00





             Test Item    Value        Reference Range Interpretation Comments

 

             UA pH (test code = UA pH) 5.0 1        5.0-8.0                   



[facility] AND [id number] 08:19:00





             Test Item    Value        Reference Range Interpretation Comments

 

             UA Protein (test code = UA Negative mg/dL                          

 



             Protein)                                            



[facility] AND [id number] 08:19:00





             Test Item    Value        Reference Range Interpretation Comments

 

             UA Glucose (test code = UA Negative mg/dL                          

 



             Glucose)                                            



[facility] AND [id number] 08:19:00





             Test Item    Value        Reference Range Interpretation Comments

 

             UA Ketones (test code = UA Negative mg/dL                          

 



             Ketones)                                            



[facility] AND [id number] 08:19:00





             Test Item    Value        Reference Range Interpretation Comments

 

             UA Bili (test code = Negative *NA*(22                          

 



             UA Bili)     3:19 AM)                               



[facility] AND [id number] 08:19:00





             Test Item    Value        Reference Range Interpretation Comments

 

             UA Blood (test code = Small *ABN*(22                           



             UA Blood)    3:19 AM)                               



[facility] AND [id number] 08:19:00





             Test Item    Value        Reference Range Interpretation Comments

 

             UA Urobilinogen (test code = UA no gt        0.1-1.0               

    



             Urobilinogen)                                        



[facility] AND [id number] 08:19:00





             Test Item    Value        Reference Range Interpretation Comments

 

             UA Nitrite (test code Positive *ABN*(22                        

   



             = UA Nitrite) 3:19 AM)                               



[facility] AND [id number] 08:19:00





             Test Item    Value        Reference Range Interpretation Comments

 

             UA Leuk Est (test code Large *ABN*(22 3:19                     

      



             = UA Leuk Est) AM)                                    



[facility] AND [id number] 08:19:00





             Test Item    Value        Reference Range Interpretation Comments

 

             UA WBC (test code = no gt        See_Comment                [Automa

huma message] The



             UA WBC)                                             system which ge

nerated this



                                                                 result transmit

huma



                                                                 reference range

: <=5. The



                                                                 reference range

 was not



                                                                 used to interpr

et this



                                                                 result as zayda

l/abnormal.



[facility] [id number] 08:19:00





             Test Item    Value        Reference Range Interpretation Comments

 

             UA RBC (test code = 8            See_Comment                [Automa

huma message] The



             UA RBC)                                             system which ge

nerated this



                                                                 result transmit

huma



                                                                 reference range

: <=2. The



                                                                 reference range

 was not



                                                                 used to interpr

et this



                                                                 result as zayda

l/abnormal.



[facility] [id number] 08:19:00





             Test Item    Value        Reference Range Interpretation Comments

 

             UA Bacteria (test code = UA Occasional /HPF                        

   



             Bacteria)                                           



[facility] [id number] 08:19:00





             Test Item    Value        Reference Range Interpretation Comments

 

             UA Mucus (test code = UA Mucus) Few /LPF                           

    



[facility] [id number] 08:19:00





             Test Item    Value        Reference Range Interpretation Comments

 

             UA Amorph Ivonne (test code = Occasional /HPF                        

   



             UA Amorph Ivonne)                                        



[facility] [id number] 08:19:00





             Test Item    Value        Reference Range Interpretation Comments

 

             UA Sq Epi (test code = UA Sq Epi) None Seen                        

      



[facility] [id number] 08:19:00





             Test Item    Value        Reference Range Interpretation Comments

 

             Culture: Urine (test Holding For Better                           



             code = Culture: Urine) Growth                                 



[facility]PT:ISOLATE:ORDQN:[id number] 08:19:00





             Test Item    Value        Reference Range Interpretation Comments

 

             Culture: Urine (test >100,000 CFU/mL                           



             code = Culture: Providencia stuartii                           



             Urine)                                              



[facility] SharmaineannCEFTRIAXONE:SUSC:PT:ISOLATE:ORDQN:[id number] 08:19:00





             Test Item    Value        Reference Range Interpretation Comments

 

             Providencia stuartii Providencia stuartii                          

 



             (test code = Providencia                                        



             stuartii)                                           



[facility] [id number] 08:19:00





             Test Item    Value        Reference Range Interpretation Comments

 

             UA Color (test code = Yellow *NA*(22 3:19                      

     



             UA Color)    AM)                                    



[facility] [id number] 08:19:00





             Test Item    Value        Reference Range Interpretation Comments

 

             UA Turbidity (test code Marked *ABN*(22                        

   



             = UA Turbidity) 3:19 AM)                               



[facility] [id number] 08:19:00





             Test Item    Value        Reference Range Interpretation Comments

 

             UA Spec Grav (test code = UA Spec 1.013 1                          

      



             Grav)                                               



[facility] AND [id number] 08:19:00





             Test Item    Value        Reference Range Interpretation Comments

 

             UA pH (test code = UA pH) 5.0 1        5.0-8.0                   



[facility] AND [id number] 08:19:00





             Test Item    Value        Reference Range Interpretation Comments

 

             UA Protein (test code = UA Negative mg/dL                          

 



             Protein)                                            



[facility] AND [id number] 08:19:00





             Test Item    Value        Reference Range Interpretation Comments

 

             UA Glucose (test code = UA Negative mg/dL                          

 



             Glucose)                                            



[facility] AND [id number] 08:19:00





             Test Item    Value        Reference Range Interpretation Comments

 

             UA Ketones (test code = UA Negative mg/dL                          

 



             Ketones)                                            



[facility] AND [id number] 08:19:00





             Test Item    Value        Reference Range Interpretation Comments

 

             UA Bili (test code = Negative *NA*(22                          

 



             UA Bili)     3:19 AM)                               



[facility] AND [id number] 08:19:00





             Test Item    Value        Reference Range Interpretation Comments

 

             UA Blood (test code = Small *ABN*(22                           



             UA Blood)    3:19 AM)                               



[facility] AND [id number] 08:19:00





             Test Item    Value        Reference Range Interpretation Comments

 

             UA Urobilinogen (test code = UA no gt        0.1-1.0               

    



             Urobilinogen)                                        



[facility] AND [id number] 08:19:00





             Test Item    Value        Reference Range Interpretation Comments

 

             UA Nitrite (test code Positive *ABN*(22                        

   



             = UA Nitrite) 3:19 AM)                               



[facility] AND [id number] 08:19:00





             Test Item    Value        Reference Range Interpretation Comments

 

             UA Leuk Est (test code Large *ABN*(22 3:19                     

      



             = UA Leuk Est) AM)                                    



[facility] AND [id number] 08:19:00





             Test Item    Value        Reference Range Interpretation Comments

 

             UA WBC (test code = no gt        See_Comment                [Automa

huma message] The



             UA WBC)                                             system which ge

nerated this



                                                                 result transmit

huma



                                                                 reference range

: <=5. The



                                                                 reference range

 was not



                                                                 used to interpr

et this



                                                                 result as zayda

l/abnormal.



[facility] [id number] 08:19:00





             Test Item    Value        Reference Range Interpretation Comments

 

             UA RBC (test code = 8            See_Comment                [Automa

huma message] The



             UA RBC)                                             system which ge

nerated this



                                                                 result transmit

huma



                                                                 reference range

: <=2. The



                                                                 reference range

 was not



                                                                 used to interpr

et this



                                                                 result as zayda

l/abnormal.



[facility] [id number] 08:19:00





             Test Item    Value        Reference Range Interpretation Comments

 

             UA Bacteria (test code = UA Occasional /HPF                        

   



             Bacteria)                                           



[facility] [id number] 08:19:00





             Test Item    Value        Reference Range Interpretation Comments

 

             UA Mucus (test code = UA Mucus) Few /LPF                           

    



[facility] [id number] 08:19:00





             Test Item    Value        Reference Range Interpretation Comments

 

             UA Amorph Ivonne (test code = Occasional /HPF                        

   



             UA Amorph Ivonne)                                        



[facility] [id number] 08:19:00





             Test Item    Value        Reference Range Interpretation Comments

 

             UA Sq Epi (test code = UA Sq Epi) None Seen                        

      



[facility] [id number] 08:19:00





             Test Item    Value        Reference Range Interpretation Comments

 

             Culture: Urine (test Holding For Better                           



             code = Culture: Urine) Growth                                 



[facility] JoseKARYNAFTRIAXONE:SUSC:PT:ISOLATE:ORDQN:[id number] 08:19:00





             Test Item    Value        Reference Range Interpretation Comments

 

             Culture: Urine (test >100,000 CFU/mL                           



             code = Culture: Providencia stuartii                           



             Urine)                                              



[facility] JoseCEFTRIAXONE:SUSC:PT:ISOLATE:ORDQN:[id number] 08:19:00





             Test Item    Value        Reference Range Interpretation Comments

 

             Providencia stuartii Providencia stuartii                          

 



             (test code = Providencia                                        



             stuartii)                                           



[facility] [id number] 08:19:00





             Test Item    Value        Reference Range Interpretation Comments

 

             UA Color (test code = Yellow *NA*(22 3:19                      

     



             UA Color)    AM)                                    



[facility] [id number] 08:19:00





             Test Item    Value        Reference Range Interpretation Comments

 

             UA Turbidity (test code Marked *ABN*(22                        

   



             = UA Turbidity) 3:19 AM)                               



[facility] AND [id number] 08:19:00





             Test Item    Value        Reference Range Interpretation Comments

 

             UA Spec Grav (test code = UA Spec 1.013 1                          

      



             Grav)                                               



[facility] AND [id number] 08:19:00





             Test Item    Value        Reference Range Interpretation Comments

 

             UA pH (test code = UA pH) 5.0 1        5.0-8.0                   



[facility] AND [id number] 08:19:00





             Test Item    Value        Reference Range Interpretation Comments

 

             UA Protein (test code = UA Negative mg/dL                          

 



             Protein)                                            



[facility] AND [id number] 08:19:00





             Test Item    Value        Reference Range Interpretation Comments

 

             UA Glucose (test code = UA Negative mg/dL                          

 



             Glucose)                                            



[facility] AND [id number] 08:19:00





             Test Item    Value        Reference Range Interpretation Comments

 

             UA Ketones (test code = UA Negative mg/dL                          

 



             Ketones)                                            



[facility] AND [id number] 08:19:00





             Test Item    Value        Reference Range Interpretation Comments

 

             UA Bili (test code = Negative *NA*(22                          

 



             UA Bili)     3:19 AM)                               



[facility] AND [id number] 08:19:00





             Test Item    Value        Reference Range Interpretation Comments

 

             UA Blood (test code = Small *ABN*(22                           



             UA Blood)    3:19 AM)                               



[facility] AND [id number] 08:19:00





             Test Item    Value        Reference Range Interpretation Comments

 

             UA Urobilinogen (test code = UA no gt        0.1-1.0               

    



             Urobilinogen)                                        



[facility] AND [id number] 08:19:00





             Test Item    Value        Reference Range Interpretation Comments

 

             UA Nitrite (test code Positive *ABN*(22                        

   



             = UA Nitrite) 3:19 AM)                               



[facility] AND [id number] 08:19:00





             Test Item    Value        Reference Range Interpretation Comments

 

             UA Leuk Est (test code Large *ABN*(22 3:19                     

      



             = UA Leuk Est) AM)                                    



[facility] AND [id number] 08:19:00





             Test Item    Value        Reference Range Interpretation Comments

 

             UA WBC (test code = no gt        See_Comment                [Automa

huma message] The



             UA WBC)                                             system which ge

nerated this



                                                                 result transmit

huma



                                                                 reference range

: <=5. The



                                                                 reference range

 was not



                                                                 used to interpr

et this



                                                                 result as zayda

l/abnormal.



[facility] [id number] 08:19:00





             Test Item    Value        Reference Range Interpretation Comments

 

             UA RBC (test code = 8            See_Comment                [Automa

huma message] The



             UA RBC)                                             system which ge

nerated this



                                                                 result transmit

huma



                                                                 reference range

: <=2. The



                                                                 reference range

 was not



                                                                 used to interpr

et this



                                                                 result as zayda

l/abnormal.



[facility] [id number] 08:19:00





             Test Item    Value        Reference Range Interpretation Comments

 

             UA Bacteria (test code = UA Occasional /HPF                        

   



             Bacteria)                                           



[facility] [id number] 08:19:00





             Test Item    Value        Reference Range Interpretation Comments

 

             UA Mucus (test code = UA Mucus) Few /LPF                           

    



[facility] [id number] 08:19:00





             Test Item    Value        Reference Range Interpretation Comments

 

             UA Amorph Ivonne (test code = Occasional /HPF                        

   



             UA Amorph Ivonne)                                        



[facility] [id number] 08:19:00





             Test Item    Value        Reference Range Interpretation Comments

 

             UA Sq Epi (test code = UA Sq Epi) None Seen                        

      



[facility] [id number] 08[facility]:00





             Test Item    Value        Reference Range Interpretation Comments

 

             Culture: Urine (test Holding For Better                           



             code = Culture: Urine) Growth                                 



[id number]





             Test Item    Value        Reference Range Interpretation Comments

 

             PT (test code = PT) 13.1 s       12.0-14.7                 



[facility] [id number] 00:20:00





             Test Item    Value        Reference Range Interpretation Comments

 

             INR (test code = INR) 1.00 1       0.85-1.17                 



[facility] [id number] 00:20:00





             Test Item    Value        Reference Range Interpretation Comments

 

             PTT (test code = PTT) 31.5 s       22.9-35.8                 



[facility] CenterEturnmkOJKIASGECJ8311-97-06 00:20:00





             Test Item    Value        Reference Range Interpretation Comments

 

             Segs (test code = Segs) 68.7         45.0-75.0                 



[facility] Hospit[id number] 00:20:00





             Test Item    Value        Reference Range Interpretation Comments

 

             Lymphocytes (test code = Lymphocytes) 19.6         20.0-40.0       

          



[id number]





             Test Item    Value        Reference Range Interpretation Comments

 

             Monocytes (test code = Monocytes) 9.4          2.0-12.0            

      



[facility] [id number] 00:20:00





             Test Item    Value        Reference Range Interpretation Comments

 

             Eosinophils (test code = 1.4          See_Comment                [A

utomated message] The



             Eosinophils)                                        system which ge

nerated



                                                                 this result tra

nsmitted



                                                                 reference range

: <=4.0.



                                                                 The reference r

zhen was



                                                                 not used to int

erpret



                                                                 this result as



                                                                 normal/abnormal

.



[facility] [id number] 00:20:00





             Test Item    Value        Reference Range Interpretation Comments

 

             Basophils (test code = 0.9          See_Comment                [Aut

omated message] The



             Basophils)                                          system which ge

nerated



                                                                 this result tra

nsmitted



                                                                 reference range

: <=1.0.



                                                                 The reference r

zhen was



                                                                 not used to int

erpret



                                                                 this result as



                                                                 normal/abnormal

.



[facility] [id number] 00:20:00





             Test Item    Value        Reference Range Interpretation Comments

 

             Neutrophils # (test code = Neutrophils 6.9          1.5-8.1        

           



             #)                                                  



[facility] BrookTmnipciCMBFBZERMX0292-76-28 00:20:00





             Test Item    Value        Reference Range Interpretation Comments

 

             Lymphocytes # (test code = Lymphocytes 2.0          1.0-5.5        

           



             #)                                                  



[facility] BrookBrelzykPVPDNHFLXR9156-11-30 00:20:00





             Test Item    Value        Reference Range Interpretation Comments

 

             Monocytes # (test code 1.0          See_Comment                [Aut

omated message] The



             = Monocytes #)                                        system which 

generated



                                                                 this result tra

nsmitted



                                                                 reference range

: <=0.8.



                                                                 The reference r

zhen was



                                                                 not used to int

erpret



                                                                 this result as



                                                                 normal/abnormal

.



[facility] [id number] 00:20:00





             Test Item    Value        Reference Range Interpretation Comments

 

             Eosinophils # (test code 0.1          See_Comment                [A

utomated message] The



             = Eosinophils #)                                        system whic

h generated



                                                                 this result tra

nsmitted



                                                                 reference range

: <=0.5.



                                                                 The reference r

zhen was



                                                                 not used to int

erpret



                                                                 this result as



                                                                 normal/abnormal

.



[facility] BrookZzrpkuvCPBPXWNKRA7183-26-22 00:20:00





             Test Item    Value        Reference Range Interpretation Comments

 

             Basophils # (test code 0.1          See_Comment                [Aut

omated message] The



             = Basophils #)                                        system which 

generated



                                                                 this result tra

nsmitted



                                                                 reference range

: <=0.2.



                                                                 The reference r

zhen was



                                                                 not used to int

erpret



                                                                 this result as



                                                                 normal/abnormal

.



[facility] [id number] 00:20:00





             Test Item    Value        Reference Range Interpretation Comments

 

             C-REACTIVE PROTEIN (test code = 13.2                               

    



             C-REACTIVE PROTEIN)                                        



[facility] [employer] [id number] 00:20:00





             Test Item    Value        Reference Range Interpretation Comments

 

             ABO/Rh (test code = ABO/Rh) AB POS                                 



[facility] [employer] [id number] 00:20:00





             Test Item    Value        Reference Range Interpretation Comments

 

             Antibody Scrn (test Negative (22 7:20                          

 



             code = Antibody Scrn) PM)                                    



[facility] [employer] [id number] 00:20:00





             Test Item    Value        Reference Range Interpretation Comments

 

             Glucose Lvl (test code = Glucose Lvl) 74           70-99           

          



[facility] [employer] [id number] 00:20:00





             Test Item    Value        Reference Range Interpretation Comments

 

             BUN (test code = BUN) 15           -22                      



[facility] [employer] [id number] 00:20:00





             Test Item    Value        Reference Range Interpretation Comments

 

             Creatinine Lvl (test code = Creatinine 0.61         0.50-1.40      

           



             Lvl)                                                



[facility] [employer] [id number] 00:20:00





             Test Item    Value        Reference Range Interpretation Comments

 

             Sodium Lvl (test code = Sodium Lvl) 139          135-145           

        



[facility] [employer] [id number] 00:20:00





             Test Item    Value        Reference Range Interpretation Comments

 

             Potassium Lvl (test code = Potassium 4.9          3.5-5.1          

         



             Lvl)                                                



[facility] [employer]2022-04-09 00:20:00





             Test Item    Value        Reference Range Interpretation Comments

 

             Chloride Lvl (test code = Chloride Lvl) 105                  

            



[facility] [employer] [id number] 00:20:00





             Test Item    Value        Reference Range Interpretation Comments

 

             CO2 (test code = CO2) 30           24-32                     



[facility] [id number] 00:20:00





             Test Item    Value        Reference Range Interpretation Comments

 

             Calcium Lvl (test code = Calcium Lvl) 9.5          8.5-10.5        

          



[place] [id number] 00:20:00





             Test Item    Value        Reference Range Interpretation Comments

 

             AGAP (test code = AGAP) 8.9          10.0-20.0                 



[place] 483382-04-09 00:20:00





             Test Item    Value        Reference Range Interpretation Comments

 

             eGFR (test code = eGFR) 129                                    



[place] 483382-04-09 00:20:00





             Test Item    Value        Reference Range Interpretation Comments

 

             Lactic Acid Lvl (test code = Lactic 1.2          0.5-2.2           

        



             Acid Lvl)                                           



[place] 483382-04-09 00:20:00





             Test Item    Value        Reference Range Interpretation Comments

 

             Procalcitonin Lvl (test 0.09         See_Comment                [Au

tomated message]



             code = Procalcitonin Lvl)                                        

e system which



                                                                 generated this 

result



                                                                 transmitted ref

erence



                                                                 range: <=0.10. 

The



                                                                 reference range

 was not



                                                                 used to interpr

et this



                                                                 result as



                                                                 normal/abnormal

.



[place] 842952-04-09 00:20:00





             Test Item    Value        Reference Range Interpretation Comments

 

             WBC (test code = WBC) 10.1         3.7-10.4                  



[place] 842952-04-09 00:20:00





             Test Item    Value        Reference Range Interpretation Comments

 

             RBC (test code = RBC) 5.14         4.70-6.10                 



[place] 842952-04-09 00:20:00





             Test Item    Value        Reference Range Interpretation Comments

 

             Hgb (test code = Hgb) 15.5         14.0-18.0                 



[place] 842952-04-09 00:20:00





             Test Item    Value        Reference Range Interpretation Comments

 

             Hct (test code = Hct) 46.5         42.0-54.0                 



[place] [address]-04-09 00:20:00





             Test Item    Value        Reference Range Interpretation Comments

 

             MCV (test code = MCV) 90.5         80.0-94.0                 



[place] 842952-04-09 00:20:00





             Test Item    Value        Reference Range Interpretation Comments

 

             MCH (test code = MCH) 30.1 pg      27.0-31.0                 



[place] 842952-04-09 00:20:00





             Test Item    Value        Reference Range Interpretation Comments

 

             MCHC (test code = MCHC) 33.3         32.0-36.0                 



[place] 842952-04-09 00:20:00





             Test Item    Value        Reference Range Interpretation Comments

 

             RDW (test code = RDW) 15.7         11.5-14.5                 



[place] [address]-04-09 00:20:00





             Test Item    Value        Reference Range Interpretation Comments

 

             Platelet (test code = Platelet) 302          133-450               

    



[place] [address]-04-09 00:20:00





             Test Item    Value        Reference Range Interpretation Comments

 

             MPV (test code = MPV) 8.9          7.4-10.4                  



[place] [address]-04-09 00:20:00





             Test Item    Value        Reference Range Interpretation Comments

 

             Sed Rate (test code = 17           See_Comment                [Auto

mated message] The



             Sed Rate)                                           system which ge

nerated this



                                                                 result transmit

huma



                                                                 reference range

: <=15. The



                                                                 reference range

 was not



                                                                 used to interpr

et this



                                                                 result as zayda

l/abnormal.



[place] 842952-04-09 00:20:00





             Test Item    Value        Reference Range Interpretation Comments

 

             PT (test code = PT) 13.1 s       12.0-14.7                 



[place] [address]-04-09 00:20:00





             Test Item    Value        Reference Range Interpretation Comments

 

             INR (test code = INR) 1.00 1       0.85-1.17                 



[place] [address]-04-09 00:20:00





             Test Item    Value        Reference Range Interpretation Comments

 

             PTT (test code = PTT) 31.5 s       22.9-35.8                 



[place] [address]-04-09 00:20:00





             Test Item    Value        Reference Range Interpretation Comments

 

             Segs (test code = Segs) 68.7         45.0-75.0                 



[place] [address]-04-09 00:20:00





             Test Item    Value        Reference Range Interpretation Comments

 

             Lymphocytes (test code = Lymphocytes) 19.6         20.0-40.0       

          



[place] [address]-04-09 00:20:00





             Test Item    Value        Reference Range Interpretation Comments

 

             Monocytes (test code = Monocytes) 9.4          2.0-12.0            

      



[place] [address]-04-09 00:20:00





             Test Item    Value        Reference Range Interpretation Comments

 

             Eosinophils (test code = 1.4          See_Comment                [A

utomated message] The



             Eosinophils)                                        system which ge

nerated



                                                                 this result tra

nsmitted



                                                                 reference range

: <=4.0.



                                                                 The reference r

zhen was



                                                                 not used to int

erpret



                                                                 this result as



                                                                 normal/abnormal

.



[facility]





             Test Item    Value        Reference Range Interpretation Comments

 

             Basophils (test code = 0.9          See_Comment                [Aut

omated message] The



             Basophils)                                          system which ge

nerated



                                                                 this result tra

nsmitted



                                                                 reference range

: <=1.0.



                                                                 The reference r

zhen was



                                                                 not used to int

erpret



                                                                 this result as



                                                                 normal/abnormal

.



[facility] Brook[facility]qnhpkuXRUMJVRLTL2865-86-22 [facility]:20:00





             Test Item    Value        Reference Range Interpretation Comments

 

             Neutrophils # (test code = Neutrophils 6.9          1.5-8.1        

           



             #)                                                  



[facility] BrookIayznmuFDTGCISOPO7081-96-28 00:20:00





             Test Item    Value        Reference Range Interpretation Comments

 

             Lymphocytes # (test code = Lymphocytes 2.0          1.0-5.5        

           



             #)                                                  



[facility] BrookInwynvyBMXVFAEJZI9273-53-17 00:20:00





             Test Item    Value        Reference Range Interpretation Comments

 

             Monocytes # (test code 1.0          See_Comment                [Aut

omated message] The



             = Monocytes #)                                        system which 

generated



                                                                 this result tra

nsmitted



                                                                 reference range

: <=0.8.



                                                                 The reference r

zhen was



                                                                 not used to int

erpret



                                                                 this result as



                                                                 normal/abnormal

.



[facility] BrookKemoakrCGXZAHKLIO7149-98-96 00:20:00





             Test Item    Value        Reference Range Interpretation Comments

 

             Eosinophils # (test code 0.1          See_Comment                [A

utomated message] The



             = Eosinophils #)                                        system whic

h generated



                                                                 this result tra

nsmitted



                                                                 reference range

: <=0.5.



                                                                 The reference r

zhen was



                                                                 not used to int

erpret



                                                                 this result as



                                                                 normal/abnormal

.



[facility] BrookFrnrtzcEWDHUBUDQI7931-93-01 00:20:00





             Test Item    Value        Reference Range Interpretation Comments

 

             Basophils # (test code 0.1          See_Comment                [Aut

omated message] The



             = Basophils #)                                        system which 

generated



                                                                 this result tra

nsmitted



                                                                 reference range

: <=0.2.



                                                                 The reference r

zhen was



                                                                 not used to int

erpret



                                                                 this result as



                                                                 normal/abnormal

.



[facility]hGTUAUHTKGE8692[facility]





             Test Item    Value        Reference Range Interpretation Comments

 

             C-REACTIVE PROTEIN (test code = 13.2                               

    



             C-REACTIVE PROTEIN)                                        



[facility]80[facility]





             Test Item    Value        Reference Range Interpretation Comments

 

             ABO/Rh (test code = ABO/Rh) AB POS                                 



[facility] UKPHBQR9313-86-56 00:20:00





             Test Item    Value        Reference Range Interpretation Comments

 

             Antibody Scrn (test Negative (22 7:20                          

 



             code = Antibody Scrn) PM)                                    



[facility]2022-04-09 00:20:00





             Test Item    Value        Reference Range Interpretation Comments

 

             Glucose Lvl (test code = Glucose Lvl) 74           70-99           

          



[facility]2022-04-09 00:20:00





             Test Item    Value        Reference Range Interpretation Comments

 

             BUN (test code = BUN) 15           7-22                      



[facility]2022-04-09 00:20:00





             Test Item    Value        Reference Range Interpretation Comments

 

             Creatinine Lvl (test code = Creatinine 0.61         0.50-1.40      

           



             Lvl)                                                



[facility]2022-04-09 00:20:00





             Test Item    Value        Reference Range Interpretation Comments

 

             Sodium Lvl (test code = Sodium Lvl) 139          135-145           

        



[facility] [id number] 00:20:00





             Test Item    Value        Reference Range Interpretation Comments

 

             Potassium Lvl (test code = Potassium 4.9          3.5-5.1          

         



             Lvl)                                                



[facility]2022-04-09 00:20:00





             Test Item    Value        Reference Range Interpretation Comments

 

             Chloride Lvl (test code = Chloride Lvl) 105                  

            



[facility] [id number] 00:20:00





             Test Item    Value        Reference Range Interpretation Comments

 

             CO2 (test code = CO2) 30           24-32                     



[facility]2022-04-09 00:20:00





             Test Item    Value        Reference Range Interpretation Comments

 

             Calcium Lvl (test code = Calcium Lvl) 9.5          8.5-10.5        

          



[facility]2022-04-09 00:20:00





             Test Item    Value        Reference Range Interpretation Comments

 

             AGAP (test code = AGAP) 8.9          10.0-20.0                 



[facility] [id number] 00:20:00





             Test Item    Value        Reference Range Interpretation Comments

 

             eGFR (test code = eGFR) 129                                    



[facility]2022-04-09 00:20:00





             Test Item    Value        Reference Range Interpretation Comments

 

             Lactic Acid Lvl (test code = Lactic 1.2          0.5-2.2           

        



             Acid Lvl)                                           



[facility]2022-04-09 00:20:00





             Test Item    Value        Reference Range Interpretation Comments

 

             Procalcitonin Lvl (test 0.09         See_Comment                [Au

tomated message]



             code = Procalcitonin Lvl)                                        

e system which



                                                                 generated this 

result



                                                                 transmitted ref

erence



                                                                 range: <=0.10. 

The



                                                                 reference range

 was not



                                                                 used to interpr

et this



                                                                 result as



                                                                 normal/abnormal

.



[facility] BrookKchzdwvLHRCQVCDSQ0721-44-43 00:20:00





             Test Item    Value        Reference Range Interpretation Comments

 

             WBC (test code = WBC) 10.1         3.7-10.4                  



[facility] BrookYemtdfmZUCJZRXDNG7873-61-83 00:20:00





             Test Item    Value        Reference Range Interpretation Comments

 

             RBC (test code = RBC) 5.14         4.70-6.10                 



[facility] BrookArjhmccTMWJSOKRDS1815-66-46 00:20:00





             Test Item    Value        Reference Range Interpretation Comments

 

             Hgb (test code = Hgb) 15.5         14.0-18.0                 



[facility] BrookZjqoddnOMHAGQCUOR6226-03-85 00:20:00





             Test Item    Value        Reference Range Interpretation Comments

 

             Hct (test code = Hct) 46.5         42.0-54.0                 



[facility] BrookDuglqbaPMCNMPMGSW1290-15-63 00:20:00





             Test Item    Value        Reference Range Interpretation Comments

 

             MCV (test code = MCV) 90.5         80.0-94.0                 



[facility] BrookVbfnateOGSKONMAML2957-52-04 00:20:00





             Test Item    Value        Reference Range Interpretation Comments

 

             MCH (test code = MCH) 30.1 pg      27.0-31.0                 



[facility] BrookLfbhdnaMFHSORIOLV0238-96-49 00:20:00





             Test Item    Value        Reference Range Interpretation Comments

 

             MCHC (test code = MCHC) 33.3         32.0-36.0                 



[facility] BrookLtezlpvKDBOFYTBTN3380-97-95 00:20:00





             Test Item    Value        Reference Range Interpretation Comments

 

             RDW (test code = RDW) 15.7         11.5-14.5                 



[facility] BrookHqjysldBCYXQNNASR4037-50-69 00:20:00





             Test Item    Value        Reference Range Interpretation Comments

 

             Platelet (test code = Platelet) 302          133-450               

    



[facility] BrookLbfiyexAEYUCLKPBB6797-09-13 00:20:00





             Test Item    Value        Reference Range Interpretation Comments

 

             MPV (test code = MPV) 8.9          7.4-10.4                  



[facility] BrookHtxsnxpPAGMTCRUYL3694-72-86 00:20:00





             Test Item    Value        Reference Range Interpretation Comments

 

             Sed Rate (test code = 17           See_Comment                [Auto

mated message] The



             Sed Rate)                                           system which ge

nerated this



                                                                 result transmit

huma



                                                                 reference range

: <=15. The



                                                                 reference range

 was not



                                                                 used to interpr

et this



                                                                 result as zayda

l/abnormal.



[facility] BrookFbustlcIZSXZKOCDV1240-46-95 00:20:00





             Test Item    Value        Reference Range Interpretation Comments

 

             PT (test code = PT) 13.1 s       12.0-14.7                 



[facility] BrookPliqamcAMBWBPSMEC0150-13-31 00:20:00





             Test Item    Value        Reference Range Interpretation Comments

 

             INR (test code = INR) 1.00 1       0.85-1.17                 



[facility] BrookPatvsmaRVNDZOLUNA6283-28-04 00:20:00





             Test Item    Value        Reference Range Interpretation Comments

 

             PTT (test code = PTT) 31.5 s       22.9-35.8                 



[place] 842952-04-09 00:20:00





             Test Item    Value        Reference Range Interpretation Comments

 

             Segs (test code = Segs) 68.7         45.0-75.0                 



[facility] BrookJamilmaTFHOIMGGMX4087-18-50 00:20:00





             Test Item    Value        Reference Range Interpretation Comments

 

             Lymphocytes (test code = Lymphocytes) 19.6         20.0-40.0       

          



[facility] BrookEzxhhvwOHKCNJIFLO6388-61-22 00:20:00





             Test Item    Value        Reference Range Interpretation Comments

 

             Monocytes (test code = Monocytes) 9.4          2.0-12.0            

      



[facility] BrookXyizyomHUDZAMDEQX9731-36-19 00:20:00





             Test Item    Value        Reference Range Interpretation Comments

 

             Eosinophils (test code = 1.4          See_Comment                [A

utomated message] The



             Eosinophils)                                        system which ge

nerated



                                                                 this result tra

nsmitted



                                                                 reference range

: <=4.0.



                                                                 The reference r

zhen was



                                                                 not used to int

erpret



                                                                 this result as



                                                                 normal/abnormal

.



[facility] BrookCjxehvsSVOACQNKBG4825-02-46 00:20:00





             Test Item    Value        Reference Range Interpretation Comments

 

             Basophils (test code = 0.9          See_Comment                [Aut

omated message] The



             Basophils)                                          system which ge

nerated



                                                                 this result tra

nsmitted



                                                                 reference range

: <=1.0.



                                                                 The reference r

zhen was



                                                                 not used to int

erpret



                                                                 this result as



                                                                 normal/abnormal

.



[facility] BrookUsocdbsJWBVMRJWBK2925-52-16 00:20:00





             Test Item    Value        Reference Range Interpretation Comments

 

             Neutrophils # (test code = Neutrophils 6.9          1.5-8.1        

           



             #)                                                  



[place] [address]-04-09 00:20:00





             Test Item    Value        Reference Range Interpretation Comments

 

             Lymphocytes # (test code = Lymphocytes 2.0          1.0-5.5        

           



             #)                                                  



[facility] BrookKowduavSFLJCYAGAQ3146-41-87 00:20:00





             Test Item    Value        Reference Range Interpretation Comments

 

             Monocytes # (test code 1.0          See_Comment                [Aut

omated message] The



             = Monocytes #)                                        system which 

generated



                                                                 this result tra

nsmitted



                                                                 reference range

: <=0.8.



                                                                 The reference r

zhen was



                                                                 not used to int

erpret



                                                                 this result as



                                                                 normal/abnormal

.



[facility] BrookBgcmgvqSLXDJVNVZW9196-62-68 00:20:00





             Test Item    Value        Reference Range Interpretation Comments

 

             Eosinophils # (test code 0.1          See_Comment                [A

utomated message] The



             = Eosinophils #)                                        system whic

h generated



                                                                 this result tra

nsmitted



                                                                 reference range

: <=0.5.



                                                                 The reference r

zhen was



                                                                 not used to int

erpret



                                                                 this result as



                                                                 normal/abnormal

.



[facility] CenterIlycfecWDYEVGQZBT8320-02-61 [facility]:20:00





             Test Item    Value        Reference Range Interpretation Comments

 

             Basophils # (test code 0.1          See_Comment                [Aut

omated message] The



             = Basophils #)                                        system which 

generated



                                                                 this result tra

nsmitted



                                                                 reference range

: <=0.2.



                                                                 The reference r

zhen was



                                                                 not used to int

erpret



                                                                 this result as



                                                                 normal/abnormal

.



[facility]





             Test Item    Value        Reference Range Interpretation Comments

 

             C-REACTIVE PROTEIN (test code = 13.2                               

    



             C-REACTIVE PROTEIN)                                        



[id number]





             Test Item    Value        Reference Range Interpretation Comments

 

             ABO/Rh (test code = ABO/Rh) AB POS                                 



[facility] [employer] [id number] 00:20:00





             Test Item    Value        Reference Range Interpretation Comments

 

             Antibody Scrn (test Negative (22 7:20                          

 



             code = Antibody Scrn) PM)                                    



[facility] [id number] 00:20:00





             Test Item    Value        Reference Range Interpretation Comments

 

             Glucose Lvl (test code = Glucose Lvl) 74           70-99           

          



[facility] [id number] 00:20:00





             Test Item    Value        Reference Range Interpretation Comments

 

             BUN (test code = BUN) 15           -                      



[facility] [id number] 00:20:00





             Test Item    Value        Reference Range Interpretation Comments

 

             Creatinine Lvl (test code = Creatinine 0.61         0.50-1.40      

           



             Lvl)                                                



[place] 483382-04-09 00:20:00





             Test Item    Value        Reference Range Interpretation Comments

 

             Sodium Lvl (test code = Sodium Lvl) 139          135-145           

        



[place] 483382-04-09 00:20:00





             Test Item    Value        Reference Range Interpretation Comments

 

             Potassium Lvl (test code = Potassium 4.9          3.5-5.1          

         



             Lvl)                                                



[place] 483382-04-09 00:20:00





             Test Item    Value        Reference Range Interpretation Comments

 

             Chloride Lvl (test code = Chloride Lvl) 105                  

            



[place] 483382-04-09 00:20:00





             Test Item    Value        Reference Range Interpretation Comments

 

             CO2 (test code = CO2) 30           24-32                     



[place] 483382-04-09 00:20:00





             Test Item    Value        Reference Range Interpretation Comments

 

             Calcium Lvl (test code = Calcium Lvl) 9.5          8.5-10.5        

          



[place] 483382-04-09 00:20:00





             Test Item    Value        Reference Range Interpretation Comments

 

             AGAP (test code = AGAP) 8.9          10.0-20.0                 



[place] 483382-04-09 00:20:00





             Test Item    Value        Reference Range Interpretation Comments

 

             eGFR (test code = eGFR) 129                                    



[place] 483382-04-09 00:20:00





             Test Item    Value        Reference Range Interpretation Comments

 

             Lactic Acid Lvl (test code = Lactic 1.2          0.5-2.2           

        



             Acid Lvl)                                           



[place] 483382-04-09 00:20:00





             Test Item    Value        Reference Range Interpretation Comments

 

             Procalcitonin Lvl (test 0.09         See_Comment                [Au

tomated message]



             code = Procalcitonin Lvl)                                        

e system which



                                                                 generated this 

result



                                                                 transmitted ref

erence



                                                                 range: <=0.10. 

The



                                                                 reference range

 was not



                                                                 used to interpr

et this



                                                                 result as



                                                                 normal/abnormal

.



[place] 842952-04-09 00:20:00





             Test Item    Value        Reference Range Interpretation Comments

 

             WBC (test code = WBC) 10.1         3.7-10.4                  



[place] 842952-04-09 00:20:00





             Test Item    Value        Reference Range Interpretation Comments

 

             RBC (test code = RBC) 5.14         4.70-6.10                 



[facility]VtmnsgeLZAWCRAMVU7455[facility]31[facility]71 [facility]





             Test Item    Value        Reference Range Interpretation Comments

 

             Hgb (test code = Hgb) 15.5         14.0-18.0                 



[facility] BrookFjoclzyIHRDJRZLJL6650-62-83 00:20:00





             Test Item    Value        Reference Range Interpretation Comments

 

             Hct (test code = Hct) 46.5         42.0-54.0                 



[facility] BrookUtznypaPBGRDZAMHC8544-21-53 00:20:00





             Test Item    Value        Reference Range Interpretation Comments

 

             MCV (test code = MCV) 90.5         80.0-94.0                 



[facility] BrookBnmurauELIJTTHKKF7527-48-28 00:20:00





             Test Item    Value        Reference Range Interpretation Comments

 

             MCH (test code = MCH) 30.1 pg      27.0-31.0                 



[facility] BrookHvurrnkUWCZTUTVYI1832-49-48 00:20:00





             Test Item    Value        Reference Range Interpretation Comments

 

             MCHC (test code = MCHC) 33.3         32.0-36.0                 



[facility] BrookZobjbjsGOBQVVTINJ1659-59-18 00:20:00





             Test Item    Value        Reference Range Interpretation Comments

 

             RDW (test code = RDW) 15.7         11.5-14.5                 



[facility] BrookZflsnkkMNPHCVHGGG1415-41-11 00:20:00





             Test Item    Value        Reference Range Interpretation Comments

 

             Platelet (test code = Platelet) 302          133-450               

    



[facility] BrookRyyzpjcBPAGIULSFG4529-01-93 00:20:00





             Test Item    Value        Reference Range Interpretation Comments

 

             MPV (test code = MPV) 8.9          7.4-10.4                  



[facility] BrookIcrfpnnPKQJMEGYDD0103-40-44 00:20:00





             Test Item    Value        Reference Range Interpretation Comments

 

             Sed Rate (test code = 17           See_Comment                [Auto

mated message] The



             Sed Rate)                                           system which ge

nerated this



                                                                 result transmit

huma



                                                                 reference range

: <=15. The



                                                                 reference range

 was not



                                                                 used to interpr

et this



                                                                 result as zayda

l/abnormal.



[facility] BrookFidwuucCTGUKVVELQ1206-96-93 00:20:00





             Test Item    Value        Reference Range Interpretation Comments

 

             PT (test code = PT) 13.1 s       12.0-14.7                 



[facility] BrookQkqzssaXMRVKEBRGQ4235-47-85 00:20:00





             Test Item    Value        Reference Range Interpretation Comments

 

             INR (test code = INR) 1.00 1       0.85-1.17                 



[place] 842952-04-09 00:20:00





             Test Item    Value        Reference Range Interpretation Comments

 

             PTT (test code = PTT) 31.5 s       22.9-35.8                 



[facility] BrookAlhluefNDXEIJHAZM8122-84-42 00:20:00





             Test Item    Value        Reference Range Interpretation Comments

 

             Segs (test code = Segs) 68.7         45.0-75.0                 



[facility] BrookPuhmhwzNAEHXAQTGI9040-44-87 00:20:00





             Test Item    Value        Reference Range Interpretation Comments

 

             Lymphocytes (test code = Lymphocytes) 19.6         20.0-40.0       

          



[place] 842952-04-09 00:20:00





             Test Item    Value        Reference Range Interpretation Comments

 

             Monocytes (test code = Monocytes) 9.4          2.0-12.0            

      



[facility] BrookOdahvmoRQPGKQHREH3276-82-28 00:20:00





             Test Item    Value        Reference Range Interpretation Comments

 

             Eosinophils (test code = 1.4          See_Comment                [A

utomated message] The



             Eosinophils)                                        system which ge

nerated



                                                                 this result tra

nsmitted



                                                                 reference range

: <=4.0.



                                                                 The reference r

zhen was



                                                                 not used to int

erpret



                                                                 this result as



                                                                 normal/abnormal

.



[facility] BrookMvtnfryNQEPBDNBFX2387-56-63 00:20:00





             Test Item    Value        Reference Range Interpretation Comments

 

             Basophils (test code = 0.9          See_Comment                [Aut

omated message] The



             Basophils)                                          system which ge

nerated



                                                                 this result tra

nsmitted



                                                                 reference range

: <=1.0.



                                                                 The reference r

zhen was



                                                                 not used to int

erpret



                                                                 this result as



                                                                 normal/abnormal

.



[facility] BrookXfghcvoPUGKUHDESY7521-95-12 00:20:00





             Test Item    Value        Reference Range Interpretation Comments

 

             Neutrophils # (test code = Neutrophils 6.9          1.5-8.1        

           



             #)                                                  



[facility] BrookJvrnifqRKYWJWDTBL9581-08-16 00:20:00





             Test Item    Value        Reference Range Interpretation Comments

 

             Lymphocytes # (test code = Lymphocytes 2.0          1.0-5.5        

           



             #)                                                  



[place] 842952-04-09 00:20:00





             Test Item    Value        Reference Range Interpretation Comments

 

             Monocytes # (test code 1.0          See_Comment                [Aut

omated message] The



             = Monocytes #)                                        system which 

generated



                                                                 this result tra

nsmitted



                                                                 reference range

: <=0.8.



                                                                 The reference r

zhen was



                                                                 not used to int

erpret



                                                                 this result as



                                                                 normal/abnormal

.



[place] 842952-04-09 00:20:00





             Test Item    Value        Reference Range Interpretation Comments

 

             Eosinophils # (test code 0.1          See_Comment                [A

utomated message] The



             = Eosinophils #)                                        system whic

h generated



                                                                 this result tra

nsmitted



                                                                 reference range

: <=0.5.



                                                                 The reference r

zhen was



                                                                 not used to int

erpret



                                                                 this result as



                                                                 normal/abnormal

.



[facility]WjozqqvHSCMESHLDT5638[facility]77-[facility]





             Test Item    Value        Reference Range Interpretation Comments

 

             Basophils # (test code 0.1          See_Comment                [Aut

omated message] The



             = Basophils #)                                        system which 

generated



                                                                 this result tra

nsmitted



                                                                 reference range

: <=0.2.



                                                                 The reference r

zhen was



                                                                 not used to int

erpret



                                                                 this result as



                                                                 normal/abnormal

.



[facility] HospitalWlwwhmrYAMPWOWRHT9875-43-68 [facility]:20:00





             Test Item    Value        Reference Range Interpretation Comments

 

             C-REACTIVE PROTEIN (test code = 13.2                               

    



             C-REACTIVE PROTEIN)                                        



[facility] [employer] [id number] 00:20:00





             Test Item    Value        Reference Range Interpretation Comments

 

             ABO/Rh (test code = ABO/Rh) AB POS                                 



[facility] [place] [id number] 00:20:00





             Test Item    Value        Reference Range Interpretation Comments

 

             Antibody Scrn (test Negative (22 7:20                          

 



             code = Antibody Scrn) PM)                                    



[facility] [employer] [id number] 00:20:00





             Test Item    Value        Reference Range Interpretation Comments

 

             Glucose Lvl (test code = Glucose Lvl) 74           70-99           

          



[facility] [employer] [id number] 00:20:00





             Test Item    Value        Reference Range Interpretation Comments

 

             BUN (test code = BUN) 15           7-22                      



[facility] [employer]2022-04-09 00:20:00





             Test Item    Value        Reference Range Interpretation Comments

 

             Creatinine Lvl (test code = Creatinine 0.61         0.50-1.40      

           



             Lvl)                                                



[facility] [employer]2022-04-09 00:20:00





             Test Item    Value        Reference Range Interpretation Comments

 

             Sodium Lvl (test code = Sodium Lvl) 139          135-145           

        



[facility] [employer] [id number] 00:20:00





             Test Item    Value        Reference Range Interpretation Comments

 

             Potassium Lvl (test code = Potassium 4.9          3.5-5.1          

         



             Lvl)                                                



[facility] [employer] [id number] 00:20:00





             Test Item    Value        Reference Range Interpretation Comments

 

             Chloride Lvl (test code = Chloride Lvl) 105                  

            



[facility]2022-04-09 00:20:00





             Test Item    Value        Reference Range Interpretation Comments

 

             CO2 (test code = CO2) 30           24-32                     



[place] 483382-04-09 00:20:00





             Test Item    Value        Reference Range Interpretation Comments

 

             Calcium Lvl (test code = Calcium Lvl) 9.5          8.5-10.5        

          



[place] 483382-04-09 00:20:00





             Test Item    Value        Reference Range Interpretation Comments

 

             AGAP (test code = AGAP) 8.9          10.0-20.0                 



[facility]2022-04-09 00:20:00





             Test Item    Value        Reference Range Interpretation Comments

 

             eGFR (test code = eGFR) 129                                    



[facility]2022-04-09 00:20:00





             Test Item    Value        Reference Range Interpretation Comments

 

             Lactic Acid Lvl (test code = Lactic 1.2          0.5-2.2           

        



             Acid Lvl)                                           



[facility]2022-04-09 00:20:00





             Test Item    Value        Reference Range Interpretation Comments

 

             Procalcitonin Lvl (test 0.09         See_Comment                [Au

tomated message]



             code = Procalcitonin Lvl)                                        Th

e system which



                                                                 generated this 

result



                                                                 transmitted ref

erence



                                                                 range: <=0.10. 

The



                                                                 reference range

 was not



                                                                 used to interpr

et this



                                                                 result as



                                                                 normal/abnormal

.



[facility] BrookTkajeaeIMQVJMBGSL9235-77-14 [facility]:20:00





             Test Item    Value        Reference Range Interpretation Comments

 

             WBC (test code = WBC) 10.1         3.7-10.4                  



[place] 842952-04-09 00:20:00





             Test Item    Value        Reference Range Interpretation Comments

 

             RBC (test code = RBC) 5.14         4.70-6.10                 



[place] 842952-04-09 00:20:00





             Test Item    Value        Reference Range Interpretation Comments

 

             Hgb (test code = Hgb) 15.5         14.0-18.0                 



[place] 842952-04-09 00:20:00





             Test Item    Value        Reference Range Interpretation Comments

 

             Hct (test code = Hct) 46.5         42.0-54.0                 



[place] 842952-04-09 00:20:00





             Test Item    Value        Reference Range Interpretation Comments

 

             MCV (test code = MCV) 90.5         80.0-94.0                 



[facility] BrookButkgjcOTRDIATJEJ1512-15-20 [facility]:20:00





             Test Item    Value        Reference Range Interpretation Comments

 

             MCH (test code = MCH) 30.1 pg      27.0-31.0                 



[facility] BrookVbgtugnCEXGEBNCZF1276-26-96 00:20:00





             Test Item    Value        Reference Range Interpretation Comments

 

             MCHC (test code = MCHC) 33.3         32.0-36.0                 



[facility] BrookRivrjovYFOVHWWHQJ3485-14-74 00:20:00





             Test Item    Value        Reference Range Interpretation Comments

 

             RDW (test code = RDW) 15.7         11.5-14.5                 



[facility] BrookVjetxfpSCJGMWGOOF3511-19-97 00:20:00





             Test Item    Value        Reference Range Interpretation Comments

 

             Platelet (test code = Platelet) 302          133-450               

    



[facility] BrookTsavxthXLOGIXPZQM7901-36-26 00:20:00





             Test Item    Value        Reference Range Interpretation Comments

 

             MPV (test code = MPV) 8.9          7.4-10.4                  



[facility] BrookFblvhiiQYYIOUFKQM6475-98-45 00:20:00





             Test Item    Value        Reference Range Interpretation Comments

 

             Sed Rate (test code = 17           See_Comment                [Auto

mated message] The



             Sed Rate)                                           system which ge

nerated this



                                                                 result transmit

huma



                                                                 reference range

: <=15. The



                                                                 reference range

 was not



                                                                 used to interpr

et this



                                                                 result as zayda

l/abnormal.



[facility] BrookUkusikgRXOACHCDXV6204-12-81 00:20:00





             Test Item    Value        Reference Range Interpretation Comments

 

             PT (test code = PT) 13.1 s       12.0-14.7                 



[facility] BrookVriaytbLLANWRQPPY1406-18-27 00:20:00





             Test Item    Value        Reference Range Interpretation Comments

 

             INR (test code = INR) 1.00 1       0.85-1.17                 



[place] 842952-04-09 00:20:00





             Test Item    Value        Reference Range Interpretation Comments

 

             PTT (test code = PTT) 31.5 s       22.9-35.8                 



[place] 842952-04-09 00:20:00





             Test Item    Value        Reference Range Interpretation Comments

 

             Segs (test code = Segs) 68.7         45.0-75.0                 



[place] 842952-04-09 00:20:00





             Test Item    Value        Reference Range Interpretation Comments

 

             Lymphocytes (test code = Lymphocytes) 19.6         20.0-40.0       

          



[place] 842952-04-09 00:20:00





             Test Item    Value        Reference Range Interpretation Comments

 

             Monocytes (test code = Monocytes) 9.4          2.0-12.0            

      



[place] 842952-04-09 00:20:00





             Test Item    Value        Reference Range Interpretation Comments

 

             Eosinophils (test code = 1.4          See_Comment                [A

utomated message] The



             Eosinophils)                                        system which ge

nerated



                                                                 this result tra

nsmitted



                                                                 reference range

: <=4.0.



                                                                 The reference r

zhen was



                                                                 not used to int

erpret



                                                                 this result as



                                                                 normal/abnormal

.



[place] 842952-04-09 00:20:00





             Test Item    Value        Reference Range Interpretation Comments

 

             Basophils (test code = 0.9          See_Comment                [Aut

omated message] The



             Basophils)                                          system which ge

nerated



                                                                 this result tra

nsmitted



                                                                 reference range

: <=1.0.



                                                                 The reference r

zhen was



                                                                 not used to int

erpret



                                                                 this result as



                                                                 normal/abnormal

.



[place] 842952-04-09 00:20:00





             Test Item    Value        Reference Range Interpretation Comments

 

             Neutrophils # (test code = Neutrophils 6.9          1.5-8.1        

           



             #)                                                  



[place] 842952-04-09 00:20:00





             Test Item    Value        Reference Range Interpretation Comments

 

             Lymphocytes # (test code = Lymphocytes 2.0          1.0-5.5        

           



             #)                                                  



[facility]





             Test Item    Value        Reference Range Interpretation Comments

 

             Monocytes # (test code 1.0          See_Comment                [Aut

omated message] The



             = Monocytes #)                                        system which 

generated



                                                                 this result tra

nsmitted



                                                                 reference range

: <=0.8.



                                                                 The reference r

zhen was



                                                                 not used to int

erpret



                                                                 this result as



                                                                 normal/abnormal

.



[place] 842952-04-09 00:20:00





             Test Item    Value        Reference Range Interpretation Comments

 

             Eosinophils # (test code 0.1          See_Comment                [A

utomated message] The



             = Eosinophils #)                                        system whic

h generated



                                                                 this result tra

nsmitted



                                                                 reference range

: <=0.5.



                                                                 The reference r

zhen was



                                                                 not used to int

erpret



                                                                 this result as



                                                                 normal/abnormal

.



[place] 842952-04-09 00:20:00





             Test Item    Value        Reference Range Interpretation Comments

 

             Basophils # (test code 0.1          See_Comment                [Aut

omated message] The



             = Basophils #)                                        system which 

generated



                                                                 this result tra

nsmitted



                                                                 reference range

: <=0.2.



                                                                 The reference r

zhen was



                                                                 not used to int

erpret



                                                                 this result as



                                                                 normal/abnormal

.



[facility] [id number] 00:20:00





             Test Item    Value        Reference Range Interpretation Comments

 

             C-REACTIVE PROTEIN (test code = 13.2                               

    



             C-REACTIVE PROTEIN)                                        



[facility] [place] [employer] [id number] 00:20:00





             Test Item    Value        Reference Range Interpretation Comments

 

             ABO/Rh (test code = ABO/Rh) AB POS                                 



[facility] [place] [employer] [id number] 00:20:00





             Test Item    Value        Reference Range Interpretation Comments

 

             Antibody Scrn (test Negative (22 7:20                          

 



             code = Antibody Scrn) PM)                                    



[facility] [employer] [id number] 00:20:00





             Test Item    Value        Reference Range Interpretation Comments

 

             Glucose Lvl (test code = Glucose Lvl) 74           70-99           

          



[facility] [employer] [id number] 00:20:00





             Test Item    Value        Reference Range Interpretation Comments

 

             BUN (test code = BUN) 15           -22                      



[facility] [employer] [id number] 00:20:00





             Test Item    Value        Reference Range Interpretation Comments

 

             Creatinine Lvl (test code = Creatinine 0.61         0.50-1.40      

           



             Lvl)                                                



[facility] [employer] [id number] 00:20:00





             Test Item    Value        Reference Range Interpretation Comments

 

             Sodium Lvl (test code = Sodium Lvl) 139          135-145           

        



[facility] [employer] [id number] 00:20:00





             Test Item    Value        Reference Range Interpretation Comments

 

             Potassium Lvl (test code = Potassium 4.9          3.5-5.1          

         



             Lvl)                                                



[facility] [employer] [id number] 00:20:00





             Test Item    Value        Reference Range Interpretation Comments

 

             Chloride Lvl (test code = Chloride Lvl) 105                  

            



[facility] [employer] [id number] 00:20:00





             Test Item    Value        Reference Range Interpretation Comments

 

             CO2 (test code = CO2) 30           24-32                     



[facility] [employer] [id number] 00:20:00





             Test Item    Value        Reference Range Interpretation Comments

 

             Calcium Lvl (test code = Calcium Lvl) 9.5          8.5-10.5        

          



[facility] [employer] [id number] 00:20:00





             Test Item    Value        Reference Range Interpretation Comments

 

             AGAP (test code = AGAP) 8.9          10.0-20.0                 



[facility]2022-04-09 00:20:00





             Test Item    Value        Reference Range Interpretation Comments

 

             eGFR (test code = eGFR) 129                                    



[facility]2022-04-09 00:20:00





             Test Item    Value        Reference Range Interpretation Comments

 

             Lactic Acid Lvl (test code = Lactic 1.2          0.5-2.2           

        



             Acid Lvl)                                           



[facility]2022-04-09 00:20:00





             Test Item    Value        Reference Range Interpretation Comments

 

             Procalcitonin Lvl (test 0.09         See_Comment                [Au

tomated message]



             code = Procalcitonin Lvl)                                        

e system which



                                                                 generated this 

result



                                                                 transmitted ref

erence



                                                                 range: <=0.10. 

The



                                                                 reference range

 was not



                                                                 used to interpr

et this



                                                                 result as



                                                                 normal/abnormal

.



[place] 842952-04-09 00:20:00





             Test Item    Value        Reference Range Interpretation Comments

 

             WBC (test code = WBC) 10.1         3.7-10.4                  



[place] 842952-04-09 00:20:00





             Test Item    Value        Reference Range Interpretation Comments

 

             RBC (test code = RBC) 5.14         4.70-6.10                 



[place] 842952-04-09 00:20:00





             Test Item    Value        Reference Range Interpretation Comments

 

             Hgb (test code = Hgb) 15.5         14.0-18.0                 



[place] 842952-04-09 00:20:00





             Test Item    Value        Reference Range Interpretation Comments

 

             Hct (test code = Hct) 46.5         42.0-54.0                 



[place] 842952-04-09 00:20:00





             Test Item    Value        Reference Range Interpretation Comments

 

             MCV (test code = MCV) 90.5         80.0-94.0                 



[place] 842952-04-09 00:20:00





             Test Item    Value        Reference Range Interpretation Comments

 

             MCH (test code = MCH) 30.1 pg      27.0-31.0                 



[place] 842952-04-09 00:20:00





             Test Item    Value        Reference Range Interpretation Comments

 

             MCHC (test code = MCHC) 33.3         32.0-36.0                 



[place] [address]44[address]-04-09 00:20:00





             Test Item    Value        Reference Range Interpretation Comments

 

             RDW (test code = RDW) 15.7         11.5-14.5                 



[place] 842952-04-09 00:20:00





             Test Item    Value        Reference Range Interpretation Comments

 

             Platelet (test code = Platelet) 302          133-450               

    



[id number]





             Test Item    Value        Reference Range Interpretation Comments

 

             MPV (test code = MPV) 8.9          7.4-10.4                  



[place] 842952-04-09 00:20:00





             Test Item    Value        Reference Range Interpretation Comments

 

             Sed Rate (test code = 17           See_Comment                [Auto

mated message] The



             Sed Rate)                                           system which ge

nerated this



                                                                 result transmit

huma



                                                                 reference range

: <=15. The



                                                                 reference range

 was not



                                                                 used to interpr

et this



                                                                 result as zayda

l/abnormal.



[facility] Brook[facility]lefjevLKUECZDZAZ2859-93-92 [facility]:20:[facility]





             Test Item    Value        Reference Range Interpretation Comments

 

             PT (test code = PT) 13.1 s       12.0-14.7                 



[place] 842952-04-09 00:20:00





             Test Item    Value        Reference Range Interpretation Comments

 

             INR (test code = INR) 1.00 1       0.85-1.17                 



[place] 842952-04-09 00:20:00





             Test Item    Value        Reference Range Interpretation Comments

 

             PTT (test code = PTT) 31.5 s       22.9-35.8                 



[place] 842952-04-09 00:20:00





             Test Item    Value        Reference Range Interpretation Comments

 

             Segs (test code = Segs) 68.7         45.0-75.0                 



[place] 842952-04-09 00:20:00





             Test Item    Value        Reference Range Interpretation Comments

 

             Lymphocytes (test code = Lymphocytes) 19.6         20.0-40.0       

          



[place] 842952-04-09 00:20:00





             Test Item    Value        Reference Range Interpretation Comments

 

             Monocytes (test code = Monocytes) 9.4          2.0-12.0            

      



[place] 842952-04-09 00:20:00





             Test Item    Value        Reference Range Interpretation Comments

 

             Eosinophils (test code = 1.4          See_Comment                [A

utomated message] The



             Eosinophils)                                        system which ge

nerated



                                                                 this result tra

nsmitted



                                                                 reference range

: <=4.0.



                                                                 The reference r

zhen was



                                                                 not used to int

erpret



                                                                 this result as



                                                                 normal/abnormal

.



[facility]hvkvcvBFQJOBNZOL2356[facility]





             Test Item    Value        Reference Range Interpretation Comments

 

             Basophils (test code = 0.9          See_Comment                [Aut

omated message] The



             Basophils)                                          system which ge

nerated



                                                                 this result tra

nsmitted



                                                                 reference range

: <=1.0.



                                                                 The reference r

zhen was



                                                                 not used to int

erpret



                                                                 this result as



                                                                 normal/abnormal

.



[facility]puustrOTDZBYEOUA9597[facility]98-29 [facility]:00





             Test Item    Value        Reference Range Interpretation Comments

 

             Neutrophils # (test code = Neutrophils 6.9          1.5-8.1        

           



             #)                                                  



[facility] BrookYjdnuktTJTZTWZZUP7763-06-98 00:20:00





             Test Item    Value        Reference Range Interpretation Comments

 

             Lymphocytes # (test code = Lymphocytes 2.0          1.0-5.5        

           



             #)                                                  



[facility] BrookXhtoffqYMGUJDFXZB5453-04-53 00:20:00





             Test Item    Value        Reference Range Interpretation Comments

 

             Monocytes # (test code 1.0          See_Comment                [Aut

omated message] The



             = Monocytes #)                                        system which 

generated



                                                                 this result tra

nsmitted



                                                                 reference range

: <=0.8.



                                                                 The reference r

zhen was



                                                                 not used to int

erpret



                                                                 this result as



                                                                 normal/abnormal

.



[facility] BrookOjqynraHUSNTJBRME4033-16-61 [facility]:20:00





             Test Item    Value        Reference Range Interpretation Comments

 

             Eosinophils # (test code 0.1          See_Comment                [A

utomated message] The



             = Eosinophils #)                                        system whic

h generated



                                                                 this result tra

nsmitted



                                                                 reference range

: <=0.5.



                                                                 The reference r

zhen was



                                                                 not used to int

erpret



                                                                 this result as



                                                                 normal/abnormal

.



[facility] BrookPwajvqyZBOECWWTCB8161-08-73 00:20:00





             Test Item    Value        Reference Range Interpretation Comments

 

             Basophils # (test code 0.1          See_Comment                [Aut

omated message] The



             = Basophils #)                                        system which 

generated



                                                                 this result tra

nsmitted



                                                                 reference range

: <=0.2.



                                                                 The reference r

zhen was



                                                                 not used to int

erpret



                                                                 this result as



                                                                 normal/abnormal

.



[facility]





             Test Item    Value        Reference Range Interpretation Comments

 

             C-REACTIVE PROTEIN (test code = 13.2                               

    



             C-REACTIVE PROTEIN)                                        



[facility] [id number]:20:00





             Test Item    Value        Reference Range Interpretation Comments

 

             ABO/Rh (test code = ABO/Rh) AB POS                                 



[id number]





             Test Item    Value        Reference Range Interpretation Comments

 

             Antibody Scrn (test Negative (22 7:20                          

 



             code = Antibody Scrn) PM)                                    



[facility]2022-04-09 00:20:00





             Test Item    Value        Reference Range Interpretation Comments

 

             Glucose Lvl (test code = Glucose Lvl) 74           70-99           

          



[place] 483382-04-09 00:20:00





             Test Item    Value        Reference Range Interpretation Comments

 

             BUN (test code = BUN) 15           7-22                      



[place] 483382-04-09 00:20:00





             Test Item    Value        Reference Range Interpretation Comments

 

             Creatinine Lvl (test code = Creatinine 0.61         0.50-1.40      

           



             Lvl)                                                



[place] 483382-04-09 00:20:00





             Test Item    Value        Reference Range Interpretation Comments

 

             Sodium Lvl (test code = Sodium Lvl) 139          135-145           

        



[place] 483382-04-09 00:20:00





             Test Item    Value        Reference Range Interpretation Comments

 

             Potassium Lvl (test code = Potassium 4.9          3.5-5.1          

         



             Lvl)                                                



[place] 483382-04-09 00:20:00





             Test Item    Value        Reference Range Interpretation Comments

 

             Chloride Lvl (test code = Chloride Lvl) 105                  

            



[place] 483382-04-09 00:20:00





             Test Item    Value        Reference Range Interpretation Comments

 

             CO2 (test code = CO2) 30           24-32                     



[place] 483382-04-09 00:20:00





             Test Item    Value        Reference Range Interpretation Comments

 

             Calcium Lvl (test code = Calcium Lvl) 9.5          8.5-10.5        

          



[place] 483382-04-09 00:20:00





             Test Item    Value        Reference Range Interpretation Comments

 

             AGAP (test code = AGAP) 8.9          10.0-20.0                 



[facility]2022-04-09 00:20:00





             Test Item    Value        Reference Range Interpretation Comments

 

             eGFR (test code = eGFR) 129                                    



[place] 483382-04-09 00:20:00





             Test Item    Value        Reference Range Interpretation Comments

 

             Lactic Acid Lvl (test code = Lactic 1.2          0.5-2.2           

        



             Acid Lvl)                                           



[place] 483382-04-09 00:20:00





             Test Item    Value        Reference Range Interpretation Comments

 

             Procalcitonin Lvl (test 0.09         See_Comment                [Au

tomated message]



             code = Procalcitonin Lvl)                                        Th

e system which



                                                                 generated this 

result



                                                                 transmitted ref

erence



                                                                 range: <=0.10. 

The



                                                                 reference range

 was not



                                                                 used to interpr

et this



                                                                 result as



                                                                 normal/abnormal

.



[facility] BrookQodnaujINYAUEITCF8787-51-92 [facility]





             Test Item    Value        Reference Range Interpretation Comments

 

             WBC (test code = WBC) 10.1         3.7-10.4                  



[place] 842952-04-09 00:20:00





             Test Item    Value        Reference Range Interpretation Comments

 

             RBC (test code = RBC) 5.14         4.70-6.10                 



[place] 842952-04-09 00:20:00





             Test Item    Value        Reference Range Interpretation Comments

 

             Hgb (test code = Hgb) 15.5         14.0-18.0                 



[place] [address]-04-09 00:20:00





             Test Item    Value        Reference Range Interpretation Comments

 

             Hct (test code = Hct) 46.5         42.0-54.0                 



[place] [address]-04-09 00:20:00





             Test Item    Value        Reference Range Interpretation Comments

 

             MCV (test code = MCV) 90.5         80.0-94.0                 



[place] 842952-04-09 00:20:00





             Test Item    Value        Reference Range Interpretation Comments

 

             MCH (test code = MCH) 30.1 pg      27.0-31.0                 



[place] 842952-04-09 00:20:00





             Test Item    Value        Reference Range Interpretation Comments

 

             MCHC (test code = MCHC) 33.3         32.0-36.0                 



[place] 842952-04-09 00:20:00





             Test Item    Value        Reference Range Interpretation Comments

 

             RDW (test code = RDW) 15.7         11.5-14.5                 



[place] 842952-04-09 00:20:00





             Test Item    Value        Reference Range Interpretation Comments

 

             Platelet (test code = Platelet) 302          133-450               

    



[facility] BrookQykaaajNPUVYQSGLZ6629-46-69 [facility]:20:00





             Test Item    Value        Reference Range Interpretation Comments

 

             MPV (test code = MPV) 8.9          7.4-10.4                  



[facility] BrookOqvccyhBCGQHCEXKB5554-09-64 00:20:00





             Test Item    Value        Reference Range Interpretation Comments

 

             Sed Rate (test code = 17           See_Comment                [Auto

mated message] The



             Sed Rate)                                           system which ge

nerated this



                                                                 result transmit

huma



                                                                 reference range

: <=15. The



                                                                 reference range

 was not



                                                                 used to interpr

et this



                                                                 result as zayda

l/abnormal.



[facility] WITH [id number] 04:47:32





             Test Item    Value        Reference Range Interpretation Comments

 

             WBC (test code =              See_Comment  H             [Automated



             6690-2)                                             message] The sy

stem



                                                                 which generated



                                                                 this result



                                                                 transmitted



                                                                 reference range

:



                                                                 4.20 - 10.70



                                                                 10*3/?L. The



                                                                 reference range

 was



                                                                 not used to



                                                                 interpret this



                                                                 result as



                                                                 normal/abnormal

.

 

             RBC (test code =              See_Comment                [Automated



             789-8)                                              message] The sy

stem



                                                                 which generated



                                                                 this result



                                                                 transmitted



                                                                 reference range

:



                                                                 4.26 - 5.52



                                                                 10*6/?L. The



                                                                 reference range

 was



                                                                 not used to



                                                                 interpret this



                                                                 result as



                                                                 normal/abnormal

.

 

             HGB (test code = 16.1 g/dL    12.2-16.4                 



             718-7)                                              

 

             HCT (test code = 47.2 %       38.4-49.3                 



             4544-3)                                             

 

             MCV (test code = 86.1 fL      81.7-95.6                 



             787-2)                                              

 

             MCH (test code = 29.4 pg      26.1-32.7                 



             785-6)                                              

 

             MCHC (test code = 34.1 g/dL    31.2-35.0                 



             786-4)                                              

 

             RDW-SD (test code = 45.7 fL      38.5-51.6                 



             46787-3)                                            

 

             RDW-CV (test code = 14.6 %       12.1-15.4                 



             788-0)                                              

 

             PLT (test code =              See_Comment                [Automated



             777-3)                                              message] The sy

stem



                                                                 which generated



                                                                 this result



                                                                 transmitted



                                                                 reference range

:



                                                                 150 - 328 10*3/

?L.



                                                                 The reference r

zhen



                                                                 was not used to



                                                                 interpret this



                                                                 result as



                                                                 normal/abnormal

.

 

             MPV (test code = 11.0 fL      9.8-13.0                  



             62233-3)                                            

 

             NRBC/100 WBC (test              See_Comment                [Automat

ed



             code = 9757062523)                                        message] 

The system



                                                                 which generated



                                                                 this result



                                                                 transmitted



                                                                 reference range

:



                                                                 0.0 - 10.0 /100



                                                                 WBCs. The refer

ence



                                                                 range was not u

sed



                                                                 to interpret th

is



                                                                 result as



                                                                 normal/abnormal

.

 

             NRBC x10^3 (test code <0.01        See_Comment                [Auto

mated



             = [id number])                                        message] The s

ystem



                                                                 which generated



                                                                 this result



                                                                 transmitted



                                                                 reference range

:



                                                                 10*3/?L. The



                                                                 reference range

 was



                                                                 not used to



                                                                 interpret this



                                                                 result as



                                                                 normal/abnormal

.

 

             GRAN MAT (NEUT) % 72.2 %                                 



             (test code = 770-8)                                        

 

             IMM GRAN % (test code 0.60 %                                 



             = 2458073263)                                        

 

             LYMPH % (test code = 17.7 %                                 



             736-9)                                              

 

             MONO % (test code = 7.4 %                                  



             5905-5)                                             

 

             EOS % (test code = 1.8 %                                  



             713-8)                                              

 

             BASO % (test code = 0.3 %                                  



             706-2)                                              

 

             GRAN MAT x10^3(ANC) 9.09 10*3/uL 1.99-6.95    H            



             (test code =                                        



             4380260325)                                         

 

             IMM GRAN x10^3 (test 0.07 10*3/uL 0.00-0.06    H            



             code = 7284608094)                                        

 

             LYMPH x10^3 (test code 2.22 10*3/uL 1.09-3.23                 



             = 731-0)                                            

 

             MONO x10^3 (test code 0.93 10*3/uL 0.36-1.02                 



             = 742-7)                                            

 

             EOS x10^3 (test code = 0.22 10*3/uL 0.06-0.53                 



             711-2)                                              

 

             BASO x10^3 (test code 0.04 10*3/uL 0.01-0.09                 



             = 704-7)                                            

 

             Lab Interpretation Abnormal                               



             (test code = 37828-1)                                        



[facility]. METABOLIC PANEL ([id number])2022 
04:36:41





             Test Item    Value        Reference Range Interpretation Comments

 

             NA (test code = 138 mmol/L   135-145                   



             6771800783)                                         

 

             K (test code = 4.6 mmol/L   3.5-5.0                   



             6253415481)                                         

 

             CL (test code = 105 mmol/L                       



             4017954585)                                         

 

             CO2 TOTAL (test code = 21 mmol/L    23-31        L            



             9947237743)                                         

 

             AGAP (test code =              2-16                      



             7671521607)                                         

 

             BUN (test code = 13 mg/dL     7-23                      



             1867766602)                                         

 

             GLUCOSE (test code = 167 mg/dL           H            



             3152836333)                                         

 

             CREATININE (test code = 0.48 mg/dL   0.60-1.25    L            



             6003413964)                                         

 

             TOTAL BILI (test code = 0.8 mg/dL    0.1-1.1                   



             7401293036)                                         

 

             CALCIUM (test code = 9.3 mg/dL    8.6-10.6                  



             8771053083)                                         

 

             T PROTEIN (test code = 7.6 g/dL     6.3-8.2                   



             5795365947)                                         

 

             ALBUMIN (test code = 4.2 g/dL     3.5-5.0                   



             6677176523)                                         

 

             ALK PHOS (test code = 98 U/L                           



             4484737427)                                         

 

             ALTv (test code = 71 U/L       5-50         H            



             2-6)                                             

 

             AST(SGOT) (test code = 36 U/L       13-40                     



             2886599189)                                         

 

             eGFR (test code =              mL/min/1.73m2              



             0331648764)                                         

 

             MAL (test code = MAL) Association of                           



                          Glomerular Filtration                           



                          Rate (GFR) and Staging                           



                          of Kidney Disease*                           



                          +---------------------                           



                          --+-------------------                           



                          --+-------------------                           



                          ------+| GFR                           



                          (mL/min/1.73 m2) ?|                           



                          With Kidney Damage ?|                           



                          ?Without Kidney                           



                          Damage+---------------                           



                          --------+-------------                           



                          --------+-------------                           



                          ------------+| ?>90 ?                           



                          ? ? ? ? ? ? ? ?|                           



                          ?Stage one ? ? ? ? ?|                           



                          ? Normal ? ? ? ? ? ? ?                           



                          ?+--------------------                           



                          ---+------------------                           



                          ---+------------------                           



                          -------+| ?60-89 ? ? ?                           



                          ? ? ? ? ?| ?Stage two                           



                          ? ? ? ? ?| ? Decreased                           



                          GFR ? ? ? ?                            



                          +---------------------                           



                          --+-------------------                           



                          --+-------------------                           



                          ------+| ?30-59 ? ? ?                           



                          ? ? ? ? ?| ?Stage                           



                          three ? ? ? ?| ? Stage                           



                          three ? ? ? ? ?                           



                          +---------------------                           



                          --+-------------------                           



                          --+-------------------                           



                          ------+| ?15-29 ? ? ?                           



                          ? ? ? ? ?| ?Stage four                           



                          ? ? ? ? | ? Stage four                           



                          ? ? ? ? ?                              



                          ?+--------------------                           



                          ---+------------------                           



                          ---+------------------                           



                          -------+| ?<15 (or                           



                          dialysis) ? ?| ?Stage                           



                          five ? ? ? ? | ? Stage                           



                          five ? ? ? ? ?                           



                          ?+--------------------                           



                          ---+------------------                           



                          ---+------------------                           



                          -------+ *Each stage                           



                          assumes the associated                           



                          GFR level has been in                           



                          effect for at least                           



                          three months. ?Stages                           



                          1 to 5, with or                           



                          without kidney                           



                          disease, indicate                           



                          chronic kidney                           



                          disease. Notes:                           



                          Determination of                           



                          stages one and two                           



                          (with eGFR                             



                          >59mL/min/1.73 m2)                           



                          requires estimation of                           



                          kidney damage for at                           



                          least three months as                           



                          defined by structural                           



                          or functional                           



                          abnormalities of the                           



                          kidney, manifested by                           



                          either:Pathological                           



                          abnormalities or                           



                          Markers of kidney                           



                          damage (including                           



                          abnormalities in the                           



                          composition of the                           



                          blood or urine or                           



                          abnormalities in                           



                          imaging tests).                           

 

             Lab Interpretation Abnormal                               



             (test code = 18518-3)                                        



[facility]





             Test Item    Value        Reference Range Interpretation Comments

 

             MAGNESIUM (test code = 5738318502) 1.6 mg/dL    1.7-2.4      L     

       

 

             Lab Interpretation (test code = Abnormal                           

    



             81856-8)                                            



[facility] BANK [id number] 07:52:00





             Test Item    Value        Reference Range Interpretation Comments

 

             ABO/Rh (test code = ABO/Rh) AB POS                                 



[facility] [id number] 07:52:00





             Test Item    Value        Reference Range Interpretation Comments

 

             Antibody Scrn (test Negative (3/28/22 2:52                         

  



             code = Antibody Scrn) AM)                                    



[facility] [id number] 07:52:00





             Test Item    Value        Reference Range Interpretation Comments

 

             Glucose Lvl (test code = Glucose Lvl) 291          70-99           

          



[id number]





             Test Item    Value        Reference Range Interpretation Comments

 

             BUN (test code = BUN) 16           -                      



[facility]2022-03-28 07:52:00





             Test Item    Value        Reference Range Interpretation Comments

 

             Creatinine Lvl (test code = Creatinine 0.83         0.50-1.40      

           



             Lvl)                                                



[facility] [id number] 07:52:00





             Test Item    Value        Reference Range Interpretation Comments

 

             Sodium Lvl (test code = Sodium Lvl) 138          135-145           

        



[facility] [id number] 07:52:00





             Test Item    Value        Reference Range Interpretation Comments

 

             Potassium Lvl (test code = Potassium 4.7          3.5-5.1          

         



             Lvl)                                                



[facility] GVWWR2038-14-04 07:52:00





             Test Item    Value        Reference Range Interpretation Comments

 

             Chloride Lvl (test code = Chloride Lvl) 113                  

            



[facility] [id number] 07:52:00





             Test Item    Value        Reference Range Interpretation Comments

 

             CO2 (test code = CO2) 18           24-32                     



[facility] [id number] 07:52:00





             Test Item    Value        Reference Range Interpretation Comments

 

             AGAP (test code = AGAP) 11.7         10.0-20.0                 



[facility] [id number] 07:52:00





             Test Item    Value        Reference Range Interpretation Comments

 

             Calcium Lvl (test code = Calcium Lvl) 9.4          8.5-10.5        

          



[facility] [id number] 07:52:00





             Test Item    Value        Reference Range Interpretation Comments

 

             eGFR (test code = eGFR) 113                                    



[facility]





             Test Item    Value        Reference Range Interpretation Comments

 

             WBC (test code = WBC) 5.5          3.7-10.4                  



[facility]1637[facility] 07:52:00





             Test Item    Value        Reference Range Interpretation Comments

 

             RBC (test code = RBC) 5.53         4.70-6.10                 



[facility]awondSPJYKGADBN5601-72-44 07:52:00





             Test Item    Value        Reference Range Interpretation Comments

 

             Hgb (test code = Hgb) 16.3         14.0-18.0                 



[facility] Center[facility]trhtbjDQXBYPZLGA0802-53-43 07:52:00





             Test Item    Value        Reference Range Interpretation Comments

 

             Hct (test code = Hct) 50.5         42.0-54.0                 



[facility]9464[facility]30 07:52:00





             Test Item    Value        Reference Range Interpretation Comments

 

             MCV (test code = MCV) 91.3         80.0-94.0                 



[facility]bffiqiXZAKOVBMSK8398-54-64 07:52:00





             Test Item    Value        Reference Range Interpretation Comments

 

             MCH (test code = MCH) 29.5 pg      27.0-31.0                 



[facility] CenterKgaboueAXAQTMWTQS0859-80-77 07:52:00





             Test Item    Value        Reference Range Interpretation Comments

 

             MCHC (test code = MCHC) 32.3         32.0-36.0                 



[facility] HospitalNvcgoxuZTBIYMWYMT6196-19-35 07:52:00





             Test Item    Value        Reference Range Interpretation Comments

 

             RDW (test code = RDW) 15.4         11.5-14.5                 



[facility] HospitalHxfykxbEGVGREPIWQ9579-17-88 07:52:00





             Test Item    Value        Reference Range Interpretation Comments

 

             Platelet (test code = Platelet) 210          133-450               

    



[facility] CenterKegrhvyEJJJOWPDBK6730-70-00 07:52:00





             Test Item    Value        Reference Range Interpretation Comments

 

             MPV (test code = MPV) 9.3          7.4-10.4                  



[facility] [employer] BANK LGAJWYO7656-00-26 07:52:00





             Test Item    Value        Reference Range Interpretation Comments

 

             ABO/Rh (test code = ABO/Rh) AB POS                                 



[facility] [employer] BANK LHDMGPB6611-28-89 07:52:00





             Test Item    Value        Reference Range Interpretation Comments

 

             Antibody Scrn (test Negative (3/28/22 2:52                         

  



             code = Antibody Scrn) AM)                                    



[place]3382-03-28 07:52:00





             Test Item    Value        Reference Range Interpretation Comments

 

             Glucose Lvl (test code = Glucose Lvl) 291          70-99           

          



[place] 11450-03-28 07:52:00





             Test Item    Value        Reference Range Interpretation Comments

 

             BUN (test code = BUN) 16           7-22                      



[place] 483382-03-28 07:52:00





             Test Item    Value        Reference Range Interpretation Comments

 

             Creatinine Lvl (test code = Creatinine 0.83         0.50-1.40      

           



             Lvl)                                                



[place] 483382-03-28 07:52:00





             Test Item    Value        Reference Range Interpretation Comments

 

             Sodium Lvl (test code = Sodium Lvl) 138          135-145           

        



[place] 483382-03-28 07:52:00





             Test Item    Value        Reference Range Interpretation Comments

 

             Potassium Lvl (test code = Potassium 4.7          3.5-5.1          

         



             Lvl)                                                



[place] 483382-03-28 07:52:00





             Test Item    Value        Reference Range Interpretation Comments

 

             Chloride Lvl (test code = Chloride Lvl) 113                  

            



[place] 483382-03-28 07:52:00





             Test Item    Value        Reference Range Interpretation Comments

 

             CO2 (test code = CO2) 18           24-32                     



[place] 483382-03-28 07:52:00





             Test Item    Value        Reference Range Interpretation Comments

 

             AGAP (test code = AGAP) 11.7         10.0-20.0                 



[place] 483382-03-28 07:52:00





             Test Item    Value        Reference Range Interpretation Comments

 

             Calcium Lvl (test code = Calcium Lvl) 9.4          8.5-10.5        

          



[place] 483382-03-28 07:52:00





             Test Item    Value        Reference Range Interpretation Comments

 

             eGFR (test code = eGFR) 113                                    



[place] 842952-03-28 07:52:00





             Test Item    Value        Reference Range Interpretation Comments

 

             WBC (test code = WBC) 5.5          3.7-10.4                  



[place] [address]-03-28 07:52:00





             Test Item    Value        Reference Range Interpretation Comments

 

             RBC (test code = RBC) 5.53         4.70-6.10                 



[place] [address]-03-28 07:52:00





             Test Item    Value        Reference Range Interpretation Comments

 

             Hgb (test code = Hgb) 16.3         14.0-18.0                 



[facility] MwhdhopPHFZXGJPMA2174-67-11 07:52:00





             Test Item    Value        Reference Range Interpretation Comments

 

             Hct (test code = Hct) 50.5         42.0-54.0                 



[facility] EcccldiMXAGYNEOSZ7755-26-59 07:52:00





             Test Item    Value        Reference Range Interpretation Comments

 

             MCV (test code = MCV) 91.3         80.0-94.0                 



[facility] GauvlavYAXJKIYCTO6087-43-01 07:52:00





             Test Item    Value        Reference Range Interpretation Comments

 

             MCH (test code = MCH) 29.5 pg      27.0-31.0                 



[facility] MtibwksWHQWLRDSAR1154-18-55 07:52:00





             Test Item    Value        Reference Range Interpretation Comments

 

             MCHC (test code = MCHC) 32.3         32.0-36.0                 



[facility] WdhbjuyCCCBZNSKKL3906-21-85 07:52:00





             Test Item    Value        Reference Range Interpretation Comments

 

             RDW (test code = RDW) 15.4         11.5-14.5                 



[facility] QaijmrhNXEJURDSDK5706-49-85 07:52:00





             Test Item    Value        Reference Range Interpretation Comments

 

             Platelet (test code = Platelet) 210          133-450               

    



[facility] 07:52:00





             Test Item    Value        Reference Range Interpretation Comments

 

             MPV (test code = MPV) 9.3          7.4-10.4                  



[facility] [place] [employer] [id number] 07:52:00





             Test Item    Value        Reference Range Interpretation Comments

 

             ABO/Rh (test code = ABO/Rh) AB POS                                 



[facility] [place] [employer] [id number] 07:52:00





             Test Item    Value        Reference Range Interpretation Comments

 

             Antibody Scrn (test Negative (3/28/22 2:52                         

  



             code = Antibody Scrn) AM)                                    



[facility] [employer] [id number] 07:52:00





             Test Item    Value        Reference Range Interpretation Comments

 

             Glucose Lvl (test code = Glucose Lvl) 291          70-99           

          



[facility] [employer] [id number] 07:52:00





             Test Item    Value        Reference Range Interpretation Comments

 

             BUN (test code = BUN) 16           -                      



[facility] [employer] [id number] 07:52:00





             Test Item    Value        Reference Range Interpretation Comments

 

             Creatinine Lvl (test code = Creatinine 0.83         0.50-1.40      

           



             Lvl)                                                



[facility] [employer] [id number] 07:52:00





             Test Item    Value        Reference Range Interpretation Comments

 

             Sodium Lvl (test code = Sodium Lvl) 138          135-145           

        



[place] 483382-03-28 07:52:00





             Test Item    Value        Reference Range Interpretation Comments

 

             Potassium Lvl (test code = Potassium 4.7          3.5-5.1          

         



             Lvl)                                                



[place] 483382-03-28 07:52:00





             Test Item    Value        Reference Range Interpretation Comments

 

             Chloride Lvl (test code = Chloride Lvl) 113                  

            



[place] 483382-03-28 07:52:00





             Test Item    Value        Reference Range Interpretation Comments

 

             CO2 (test code = CO2) 18           24-32                     



[place] 483382-03-28 07:52:00





             Test Item    Value        Reference Range Interpretation Comments

 

             AGAP (test code = AGAP) 11.7         10.0-20.0                 



[place] 114[address]-03-28 07:52:00





             Test Item    Value        Reference Range Interpretation Comments

 

             Calcium Lvl (test code = Calcium Lvl) 9.4          8.5-10.5        

          



[place] 483382-03-28 07:52:00





             Test Item    Value        Reference Range Interpretation Comments

 

             eGFR (test code = eGFR) 113                                    



[place] [address]-03-28 07:52:00





             Test Item    Value        Reference Range Interpretation Comments

 

             WBC (test code = WBC) 5.5          3.7-10.4                  



[place] 842952-03-28 07:52:00





             Test Item    Value        Reference Range Interpretation Comments

 

             RBC (test code = RBC) 5.53         4.70-6.10                 



[place] [address]-03-28 07:52:00





             Test Item    Value        Reference Range Interpretation Comments

 

             Hgb (test code = Hgb) 16.3         14.0-18.0                 



[place] [address]-03-28 07:52:00





             Test Item    Value        Reference Range Interpretation Comments

 

             Hct (test code = Hct) 50.5         42.0-54.0                 



[place] [address]-03-28 07:52:00





             Test Item    Value        Reference Range Interpretation Comments

 

             MCV (test code = MCV) 91.3         80.0-94.0                 



[place] [address]-03-28 07:52:00





             Test Item    Value        Reference Range Interpretation Comments

 

             MCH (test code = MCH) 29.5 pg      27.0-31.0                 



[facility]





             Test Item    Value        Reference Range Interpretation Comments

 

             MCHC (test code = MCHC) 32.3         32.0-36.0                 



[facility] GxbinfzAJVVZFBKCW1320-67-60 07:52:00





             Test Item    Value        Reference Range Interpretation Comments

 

             RDW (test code = RDW) 15.4         11.5-14.5                 



[facility] YpysxvzUBXUTIFPOT3252-41-32 07:52:00





             Test Item    Value        Reference Range Interpretation Comments

 

             Platelet (test code = Platelet) 210          133-450               

    



[facility] HospitalIaxwcczUMICNBOQJE9787-95-97 07:52:00





             Test Item    Value        Reference Range Interpretation Comments

 

             MPV (test code = MPV) 9.3          7.4-10.4                  



[employer] [id number] 07:52:00





             Test Item    Value        Reference Range Interpretation Comments

 

             ABO/Rh (test code = ABO/Rh) AB POS                                 



[facility] [place] [employer] [id number] 07:52:00





             Test Item    Value        Reference Range Interpretation Comments

 

             Antibody Scrn (test Negative (3/28/22 2:52                         

  



             code = Antibody Scrn) AM)                                    



[facility] [id number] 07:52:00





             Test Item    Value        Reference Range Interpretation Comments

 

             Glucose Lvl (test code = Glucose Lvl) 291          70-99           

          



[facility] [employer] [id number] 07:52:00





             Test Item    Value        Reference Range Interpretation Comments

 

             BUN (test code = BUN) 16           7-22                      



[facility] [employer] [id number] 07:52:00





             Test Item    Value        Reference Range Interpretation Comments

 

             Creatinine Lvl (test code = Creatinine 0.83         0.50-1.40      

           



             Lvl)                                                



[employer]2022-03-28 07:52:00





             Test Item    Value        Reference Range Interpretation Comments

 

             Sodium Lvl (test code = Sodium Lvl) 138          135-145           

        



[employer]2022-03-28 07:52:00





             Test Item    Value        Reference Range Interpretation Comments

 

             Potassium Lvl (test code = Potassium 4.7          3.5-5.1          

         



             Lvl)                                                



[facility] [id number] 07:52:00





             Test Item    Value        Reference Range Interpretation Comments

 

             Chloride Lvl (test code = Chloride Lvl) 113                  

            



[facility] [id number] 07:52:00





             Test Item    Value        Reference Range Interpretation Comments

 

             CO2 (test code = CO2) 18           24-32                     



[facility]2022-03-28 07:52:00





             Test Item    Value        Reference Range Interpretation Comments

 

             AGAP (test code = AGAP) 11.7         10.0-20.0                 



[facility]2022-03-28 07:52:00





             Test Item    Value        Reference Range Interpretation Comments

 

             Calcium Lvl (test code = Calcium Lvl) 9.4          8.5-10.5        

          



[facility]2022-03-28 07:52:00





             Test Item    Value        Reference Range Interpretation Comments

 

             eGFR (test code = eGFR) 113                                    



[facility]KLDVW4664[facility]





             Test Item    Value        Reference Range Interpretation Comments

 

             WBC (test code = WBC) 5.5          3.7-10.4                  



[facility] BrookLtehgyrSNXEPQEYXW5588-48-44 07:52:00





             Test Item    Value        Reference Range Interpretation Comments

 

             RBC (test code = RBC) 5.53         4.70-6.10                 



[facility] BrookYphtvmcUIMSHFDFVE4153-95-97 07:52:00





             Test Item    Value        Reference Range Interpretation Comments

 

             Hgb (test code = Hgb) 16.3         14.0-18.0                 



[facility] BrookJwnepstBTPAEHIZSD6234-87-30 07:52:00





             Test Item    Value        Reference Range Interpretation Comments

 

             Hct (test code = Hct) 50.5         42.0-54.0                 



[facility] BrookVqvkntfWVLBUJLFTO0555-70-09 07:52:00





             Test Item    Value        Reference Range Interpretation Comments

 

             MCV (test code = MCV) 91.3         80.0-94.0                 



[facility] BrookNgcisrcDJLQPWECKD5279-53-47 07:52:00





             Test Item    Value        Reference Range Interpretation Comments

 

             MCH (test code = MCH) 29.5 pg      27.0-31.0                 



[facility] BrookDlbtcudVAJRLXZTXC0546-62-07 07:52:00





             Test Item    Value        Reference Range Interpretation Comments

 

             MCHC (test code = MCHC) 32.3         32.0-36.0                 



[facility] BrookVebfvicCICPYNFLDK3619-45-78 07:52:00





             Test Item    Value        Reference Range Interpretation Comments

 

             RDW (test code = RDW) 15.4         11.5-14.5                 



[facility] BrookIkqacxiTGXWRXLMPX6018-69-73 07:52:00





             Test Item    Value        Reference Range Interpretation Comments

 

             Platelet (test code = Platelet) 210          133-450               

    



[place] 842952-03-28 07:52:00





             Test Item    Value        Reference Range Interpretation Comments

 

             MPV (test code = MPV) 9.3          7.4-10.4                  



[facility] [id number] 07:52:00





             Test Item    Value        Reference Range Interpretation Comments

 

             ABO/Rh (test code = ABO/Rh) AB POS                                 



[facility] [id number] 07:52:00





             Test Item    Value        Reference Range Interpretation Comments

 

             Antibody Scrn (test Negative (3/28/22 2:52                         

  



             code = Antibody Scrn) AM)                                    



[facility] HospitalHang[facility] SoftwareAt[facility] CenterZJQBG0362-50-10 07:52:00





             Test Item    Value        Reference Range Interpretation Comments

 

             Glucose Lvl (test code = Glucose Lvl) 291          70-99           

          



[facility] [id number] 07:52:00





             Test Item    Value        Reference Range Interpretation Comments

 

             BUN (test code = BUN) 16           -                      



[facility] HospitalHang[facility] Huato SoftwareAt[facility] Medical [id number] 07:52:00





             Test Item    Value        Reference Range Interpretation Comments

 

             Creatinine Lvl (test code = Creatinine 0.83         0.50-1.40      

           



             Lvl)                                                



[facility] [id number] 07:52:00





             Test Item    Value        Reference Range Interpretation Comments

 

             Sodium Lvl (test code = Sodium Lvl) 138          135-145           

        



[facility] [id number] 07:52:00





             Test Item    Value        Reference Range Interpretation Comments

 

             Potassium Lvl (test code = Potassium 4.7          3.5-5.1          

         



             Lvl)                                                



[facility] [id number] 07:52:00





             Test Item    Value        Reference Range Interpretation Comments

 

             Chloride Lvl (test code = Chloride Lvl) 113                  

            



[facility] [id number] 07:52:00





             Test Item    Value        Reference Range Interpretation Comments

 

             CO2 (test code = CO2) 18           24-32                     



[facility] [id number] 07:52:00





             Test Item    Value        Reference Range Interpretation Comments

 

             AGAP (test code = AGAP) 11.7         10.0-20.0                 



[facility] Center[facility] [id number] 07:52:00





             Test Item    Value        Reference Range Interpretation Comments

 

             Calcium Lvl (test code = Calcium Lvl) 9.4          8.5-10.5        

          



[facility] [id number] 07:52:00





             Test Item    Value        Reference Range Interpretation Comments

 

             eGFR (test code = eGFR) 113                                    



[facility] BrookVzocwbtHZEIQWNTIQ8441-08-01 07:52:00





             Test Item    Value        Reference Range Interpretation Comments

 

             WBC (test code = WBC) 5.5          3.7-10.4                  



[facility] BrookMcroewxMZEEDUNSGG3097-03-83 07:52:00





             Test Item    Value        Reference Range Interpretation Comments

 

             RBC (test code = RBC) 5.53         4.70-6.10                 



[facility]JglzavsPDKYZPOTPR0335[facility]66-25 07:52:00





             Test Item    Value        Reference Range Interpretation Comments

 

             Hgb (test code = Hgb) 16.3         14.0-18.0                 



[facility] HospitalIsqtmvxYVBSMCYZYS8601-68-16 07:52:00





             Test Item    Value        Reference Range Interpretation Comments

 

             Hct (test code = Hct) 50.5         42.0-54.0                 



[facility] HospitalQvtpbpyJDLMJYYJTW9737-42-27 07:52:00





             Test Item    Value        Reference Range Interpretation Comments

 

             MCV (test code = MCV) 91.3         80.0-94.0                 



[facility] CenterVnsxbpyQDBFQKIFVP9634-80-47 07:52:00





             Test Item    Value        Reference Range Interpretation Comments

 

             MCH (test code = MCH) 29.5 pg      27.0-31.0                 



[facility] CenterOeusruuUPNNUHODSM1578-47-95 07:52:00





             Test Item    Value        Reference Range Interpretation Comments

 

             MCHC (test code = MCHC) 32.3         32.0-36.0                 



[facility] CenterWyamsvvZRXOSVMCTG7526-26-25 07:52:00





             Test Item    Value        Reference Range Interpretation Comments

 

             RDW (test code = RDW) 15.4         11.5-14.5                 



[facility] CenterAcfesugGHVYIGEAEL5087-31-91 07:52:00





             Test Item    Value        Reference Range Interpretation Comments

 

             Platelet (test code = Platelet) 210          133-450               

    



[facility] CenterVjmfclnZURJHQKTPE7153-03-96 07:52:00





             Test Item    Value        Reference Range Interpretation Comments

 

             MPV (test code = MPV) 9.3          7.4-10.4                  



[facility] [employer] [employer] [id number] 07:52:00





             Test Item    Value        Reference Range Interpretation Comments

 

             ABO/Rh (test code = ABO/Rh) AB POS                                 



[facility] [employer] [employer] [id number] 07:52:00





             Test Item    Value        Reference Range Interpretation Comments

 

             Antibody Scrn (test Negative (3/28/22 2:52                         

  



             code = Antibody Scrn) AM)                                    



[place] 483382-03-28 07:52:00





             Test Item    Value        Reference Range Interpretation Comments

 

             Glucose Lvl (test code = Glucose Lvl) 291          70-99           

          



[place] 483382-03-28 07:52:00





             Test Item    Value        Reference Range Interpretation Comments

 

             BUN (test code = BUN) 16           7-22                      



[place] 483382-03-28 07:52:00





             Test Item    Value        Reference Range Interpretation Comments

 

             Creatinine Lvl (test code = Creatinine 0.83         0.50-1.40      

           



             Lvl)                                                



[place] 483382-03-28 07:52:00





             Test Item    Value        Reference Range Interpretation Comments

 

             Sodium Lvl (test code = Sodium Lvl) 138          135-145           

        



[place] 483382-03-28 07:52:00





             Test Item    Value        Reference Range Interpretation Comments

 

             Potassium Lvl (test code = Potassium 4.7          3.5-5.1          

         



             Lvl)                                                



[place] 483382-03-28 07:52:00





             Test Item    Value        Reference Range Interpretation Comments

 

             Chloride Lvl (test code = Chloride Lvl) 113                  

            



[place] 483382-03-28 07:52:00





             Test Item    Value        Reference Range Interpretation Comments

 

             CO2 (test code = CO2) 18           24-32                     



[place] 483382-03-28 07:52:00





             Test Item    Value        Reference Range Interpretation Comments

 

             AGAP (test code = AGAP) 11.7         10.0-20.0                 



[place] 483382-03-28 07:52:00





             Test Item    Value        Reference Range Interpretation Comments

 

             Calcium Lvl (test code = Calcium Lvl) 9.4          8.5-10.5        

          



[place] 483382-03-28 07:52:00





             Test Item    Value        Reference Range Interpretation Comments

 

             eGFR (test code = eGFR) 113                                    



[place] 842952-03-28 07:52:00





             Test Item    Value        Reference Range Interpretation Comments

 

             WBC (test code = WBC) 5.5          3.7-10.4                  



[place] 842952-03-28 07:52:00





             Test Item    Value        Reference Range Interpretation Comments

 

             RBC (test code = RBC) 5.53         4.70-6.10                 



[place] 842952-03-28 07:52:00





             Test Item    Value        Reference Range Interpretation Comments

 

             Hgb (test code = Hgb) 16.3         14.0-18.0                 



[place] [address]-03-28 07:52:00





             Test Item    Value        Reference Range Interpretation Comments

 

             Hct (test code = Hct) 50.5         42.0-54.0                 



[place] 842952-03-28 07:52:00





             Test Item    Value        Reference Range Interpretation Comments

 

             MCV (test code = MCV) 91.3         80.0-94.0                 



[place] [address]-03-28 07:52:00





             Test Item    Value        Reference Range Interpretation Comments

 

             MCH (test code = MCH) 29.5 pg      27.0-31.0                 



[place] [address]-03-28 07:52:00





             Test Item    Value        Reference Range Interpretation Comments

 

             MCHC (test code = MCHC) 32.3         32.0-36.0                 



[place] 842952-03-28 07:52:00





             Test Item    Value        Reference Range Interpretation Comments

 

             RDW (test code = RDW) 15.4         11.5-14.5                 



[place] 842952-03-28 07:52:00





             Test Item    Value        Reference Range Interpretation Comments

 

             Platelet (test code = Platelet) 210          133-450               

    



[place] 842952-03-28 07:52:00





             Test Item    Value        Reference Range Interpretation Comments

 

             MPV (test code = MPV) 9.3          7.4-10.4                  



[place]3382-03-27 10:12:00





             Test Item    Value        Reference Range Interpretation Comments

 

             Glucose Lvl (test code = Glucose Lvl) 115          70-99           

          



[facility]2022-03-27 10:12:00





             Test Item    Value        Reference Range Interpretation Comments

 

             BUN (test code = BUN) 18           7-22                      



[place] 483382-03-27 10:12:00





             Test Item    Value        Reference Range Interpretation Comments

 

             Creatinine Lvl (test code = Creatinine 0.78         0.50-1.40      

           



             Lvl)                                                



[place]3382-03-27 10:12:00





             Test Item    Value        Reference Range Interpretation Comments

 

             Sodium Lvl (test code = Sodium Lvl) 138          135-145           

        



[place] 483382-03-27 10:12:00





             Test Item    Value        Reference Range Interpretation Comments

 

             Potassium Lvl (test code = Potassium 4.6          3.5-5.1          

         



             Lvl)                                                



[place] 483382-03-27 10:12:00





             Test Item    Value        Reference Range Interpretation Comments

 

             Chloride Lvl (test code = Chloride Lvl) 111                  

            



[place] 483382-03-27 10:12:00





             Test Item    Value        Reference Range Interpretation Comments

 

             CO2 (test code = CO2) 21           24-32                     



[place] 483382-03-27 10:12:00





             Test Item    Value        Reference Range Interpretation Comments

 

             AGAP (test code = AGAP) 10.6         10.0-20.0                 



[place] 483382-03-27 10:12:00





             Test Item    Value        Reference Range Interpretation Comments

 

             Calcium Lvl (test code = Calcium Lvl) 8.6          8.5-10.5        

          



[place] 483382-03-27 10:12:00





             Test Item    Value        Reference Range Interpretation Comments

 

             eGFR (test code = eGFR) 116                                    



[place] 842952-03-27 10:12:00





             Test Item    Value        Reference Range Interpretation Comments

 

             WBC (test code = WBC) 8.2          3.7-10.4                  



[place] 842952-03-27 10:12:00





             Test Item    Value        Reference Range Interpretation Comments

 

             RBC (test code = RBC) 5.14         4.70-6.10                 



[place] 842952-03-27 10:12:00





             Test Item    Value        Reference Range Interpretation Comments

 

             Hgb (test code = Hgb) 15.5         14.0-18.0                 



[place] 842952-03-27 10:12:00





             Test Item    Value        Reference Range Interpretation Comments

 

             Hct (test code = Hct) 46.0         42.0-54.0                 



[place] [address]-03-27 10:12:00





             Test Item    Value        Reference Range Interpretation Comments

 

             MCV (test code = MCV) 89.5         80.0-94.0                 



[place] [address]-03-27 10:12:00





             Test Item    Value        Reference Range Interpretation Comments

 

             MCH (test code = MCH) 30.2 pg      27.0-31.0                 



[place] [address]-03-27 10:12:00





             Test Item    Value        Reference Range Interpretation Comments

 

             MCHC (test code = MCHC) 33.8         32.0-36.0                 



[place] 842952-03-27 10:12:00





             Test Item    Value        Reference Range Interpretation Comments

 

             RDW (test code = RDW) 15.3         11.5-14.5                 



[place] [address]-03-27 10:12:00





             Test Item    Value        Reference Range Interpretation Comments

 

             Platelet (test code = Platelet) 358          133-450               

    



[place] [address]-03-27 10:12:00





             Test Item    Value        Reference Range Interpretation Comments

 

             MPV (test code = MPV) 8.3          7.4-10.4                  



[place] [address]-03-27 10:12:00





             Test Item    Value        Reference Range Interpretation Comments

 

             Glucose Lvl (test code = Glucose Lvl) 115          70-99           

          



[place] 483382-03-27 10:12:00





             Test Item    Value        Reference Range Interpretation Comments

 

             BUN (test code = BUN) 18           7-22                      



[place] 483382-03-27 10:12:00





             Test Item    Value        Reference Range Interpretation Comments

 

             Creatinine Lvl (test code = Creatinine 0.78         0.50-1.40      

           



             Lvl)                                                



[place] 483382-03-27 10:12:00





             Test Item    Value        Reference Range Interpretation Comments

 

             Sodium Lvl (test code = Sodium Lvl) 138          135-145           

        



[place] 483382-03-27 10:12:00





             Test Item    Value        Reference Range Interpretation Comments

 

             Potassium Lvl (test code = Potassium 4.6          3.5-5.1          

         



             Lvl)                                                



[place] 483382-03-27 10:12:00





             Test Item    Value        Reference Range Interpretation Comments

 

             Chloride Lvl (test code = Chloride Lvl) 111                  

            



[place] 483382-03-27 10:12:00





             Test Item    Value        Reference Range Interpretation Comments

 

             CO2 (test code = CO2) 21           24-32                     



[place] 483382-03-27 10:12:00





             Test Item    Value        Reference Range Interpretation Comments

 

             AGAP (test code = AGAP) 10.6         10.0-20.0                 



[place] [address]-03-27 10:12:00





             Test Item    Value        Reference Range Interpretation Comments

 

             Calcium Lvl (test code = Calcium Lvl) 8.6          8.5-10.5        

          



[place] 483382-03-27 10:12:00





             Test Item    Value        Reference Range Interpretation Comments

 

             eGFR (test code = eGFR) 116                                    



[place] 842952-03-27 10:12:00





             Test Item    Value        Reference Range Interpretation Comments

 

             WBC (test code = WBC) 8.2          3.7-10.4                  



[place] 842952-03-27 10:12:00





             Test Item    Value        Reference Range Interpretation Comments

 

             RBC (test code = RBC) 5.14         4.70-6.10                 



[place] 842952-03-27 10:12:00





             Test Item    Value        Reference Range Interpretation Comments

 

             Hgb (test code = Hgb) 15.5         14.0-18.0                 



[place] [address]-03-27 10:12:00





             Test Item    Value        Reference Range Interpretation Comments

 

             Hct (test code = Hct) 46.0         42.0-54.0                 



[place] [address]-03-27 10:12:00





             Test Item    Value        Reference Range Interpretation Comments

 

             MCV (test code = MCV) 89.5         80.0-94.0                 



[place] [address]-03-27 10:12:00





             Test Item    Value        Reference Range Interpretation Comments

 

             MCH (test code = MCH) 30.2 pg      27.0-31.0                 



[place] 842952-03-27 10:12:00





             Test Item    Value        Reference Range Interpretation Comments

 

             MCHC (test code = MCHC) 33.8         32.0-36.0                 



[facility]





             Test Item    Value        Reference Range Interpretation Comments

 

             RDW (test code = RDW) 15.3         11.5-14.5                 



[facility]ub[facility]PJAYBYSWAK[facility]01[facility] 10:12:00





             Test Item    Value        Reference Range Interpretation Comments

 

             Platelet (test code = Platelet) 358          133-450               

    



[facility]PX1285[facility] 10:12:00





             Test Item    Value        Reference Range Interpretation Comments

 

             MPV (test code = MPV) 8.3          7.4-10.4                  



[facility]2022-03-27 10:12:00





             Test Item    Value        Reference Range Interpretation Comments

 

             Glucose Lvl (test code = Glucose Lvl) 115          70-99           

          



[facility]2022-03-27 10:12:00





             Test Item    Value        Reference Range Interpretation Comments

 

             BUN (test code = BUN) 18           7-22                      



[facility]2022-03-27 10:12:00





             Test Item    Value        Reference Range Interpretation Comments

 

             Creatinine Lvl (test code = Creatinine 0.78         0.50-1.40      

           



             Lvl)                                                



[facility]2022-03-27 10:12:00





             Test Item    Value        Reference Range Interpretation Comments

 

             Sodium Lvl (test code = Sodium Lvl) 138          135-145           

        



[place] 483382-03-27 10:12:00





             Test Item    Value        Reference Range Interpretation Comments

 

             Potassium Lvl (test code = Potassium 4.6          3.5-5.1          

         



             Lvl)                                                



[place] 483382-03-27 10:12:00





             Test Item    Value        Reference Range Interpretation Comments

 

             Chloride Lvl (test code = Chloride Lvl) 111                  

            



[place] 483382-03-27 10:12:00





             Test Item    Value        Reference Range Interpretation Comments

 

             CO2 (test code = CO2) 21           24-32                     



[place] 483382-03-27 10:12:00





             Test Item    Value        Reference Range Interpretation Comments

 

             AGAP (test code = AGAP) 10.6         10.0-20.0                 



[place] [address]-03-27 10:12:00





             Test Item    Value        Reference Range Interpretation Comments

 

             Calcium Lvl (test code = Calcium Lvl) 8.6          8.5-10.5        

          



[place] 483382-03-27 10:12:00





             Test Item    Value        Reference Range Interpretation Comments

 

             eGFR (test code = eGFR) 116                                    



[place] [address]-03-27 10:12:00





             Test Item    Value        Reference Range Interpretation Comments

 

             WBC (test code = WBC) 8.2          3.7-10.4                  



[place] [address]-03-27 10:12:00





             Test Item    Value        Reference Range Interpretation Comments

 

             RBC (test code = RBC) 5.14         4.70-6.10                 



[place] [address]-03-27 10:12:00





             Test Item    Value        Reference Range Interpretation Comments

 

             Hgb (test code = Hgb) 15.5         14.0-18.0                 



[place] [address]-03-27 10:12:00





             Test Item    Value        Reference Range Interpretation Comments

 

             Hct (test code = Hct) 46.0         42.0-54.0                 



[place] [address]-03-27 10:12:00





             Test Item    Value        Reference Range Interpretation Comments

 

             MCV (test code = MCV) 89.5         80.0-94.0                 



[place] [address]-03-27 10:12:00





             Test Item    Value        Reference Range Interpretation Comments

 

             MCH (test code = MCH) 30.2 pg      27.0-31.0                 



[place] [address]-03-27 10:12:00





             Test Item    Value        Reference Range Interpretation Comments

 

             MCHC (test code = MCHC) 33.8         32.0-36.0                 



[place] [address]-03-27 10:12:00





             Test Item    Value        Reference Range Interpretation Comments

 

             RDW (test code = RDW) 15.3         11.5-14.5                 



[place] [address]-03-27 10:12:00





             Test Item    Value        Reference Range Interpretation Comments

 

             Platelet (test code = Platelet) 358          133-450               

    



[place] [address]-03-27 10:12:00





             Test Item    Value        Reference Range Interpretation Comments

 

             MPV (test code = MPV) 8.3          7.4-10.4                  



[place]3382-03-27 10:12:00





             Test Item    Value        Reference Range Interpretation Comments

 

             Glucose Lvl (test code = Glucose Lvl) 115          70-99           

          



[place] 483382-03-27 10:12:00





             Test Item    Value        Reference Range Interpretation Comments

 

             BUN (test code = BUN) 18           7-22                      



[place] 483382-03-27 10:12:00





             Test Item    Value        Reference Range Interpretation Comments

 

             Creatinine Lvl (test code = Creatinine 0.78         0.50-1.40      

           



             Lvl)                                                



[place] 483382-03-27 10:12:00





             Test Item    Value        Reference Range Interpretation Comments

 

             Sodium Lvl (test code = Sodium Lvl) 138          135-145           

        



[place] 483382-03-27 10:12:00





             Test Item    Value        Reference Range Interpretation Comments

 

             Potassium Lvl (test code = Potassium 4.6          3.5-5.1          

         



             Lvl)                                                



[place] 483382-03-27 10:12:00





             Test Item    Value        Reference Range Interpretation Comments

 

             Chloride Lvl (test code = Chloride Lvl) 111                  

            



[place] 483382-03-27 10:12:00





             Test Item    Value        Reference Range Interpretation Comments

 

             CO2 (test code = CO2) 21           24-32                     



[place] 483382-03-27 10:12:00





             Test Item    Value        Reference Range Interpretation Comments

 

             AGAP (test code = AGAP) 10.6         10.0-20.0                 



[place] 483382-03-27 10:12:00





             Test Item    Value        Reference Range Interpretation Comments

 

             Calcium Lvl (test code = Calcium Lvl) 8.6          8.5-10.5        

          



[facility]2022-03-27 10:12:00





             Test Item    Value        Reference Range Interpretation Comments

 

             eGFR (test code = eGFR) 116                                    



[facility]JP[facility]100[facility] 10[facility]12:00





             Test Item    Value        Reference Range Interpretation Comments

 

             WBC (test code = WBC) 8.2          3.7-10.4                  



[place] 842952-03-27 10:12:00





             Test Item    Value        Reference Range Interpretation Comments

 

             RBC (test code = RBC) 5.14         4.70-6.10                 



[facility]adwxpyPVUAZOKSDC3962[facility]97-34 10:12:00





             Test Item    Value        Reference Range Interpretation Comments

 

             Hgb (test code = Hgb) 15.5         14.0-18.0                 



[place] [address]-03-27 10:12:00





             Test Item    Value        Reference Range Interpretation Comments

 

             Hct (test code = Hct) 46.0         42.0-54.0                 



[place] 842952-03-27 10:12:00





             Test Item    Value        Reference Range Interpretation Comments

 

             MCV (test code = MCV) 89.5         80.0-94.0                 



[place] 842952-03-27 10:12:00





             Test Item    Value        Reference Range Interpretation Comments

 

             MCH (test code = MCH) 30.2 pg      27.0-31.0                 



[place] 842952-03-27 10:12:00





             Test Item    Value        Reference Range Interpretation Comments

 

             MCHC (test code = MCHC) 33.8         32.0-36.0                 



[place] 842952-03-27 10:12:00





             Test Item    Value        Reference Range Interpretation Comments

 

             RDW (test code = RDW) 15.3         11.5-14.5                 



[place] 842952-03-27 10:12:00





             Test Item    Value        Reference Range Interpretation Comments

 

             Platelet (test code = Platelet) 358          133-450               

    



[facility] BrookXfanomcWZVUQCDAHQ7058-19-65 10:12:00





             Test Item    Value        Reference Range Interpretation Comments

 

             MPV (test code = MPV) 8.3          7.4-10.4                  



[facility]2022-03-27 10:12:00





             Test Item    Value        Reference Range Interpretation Comments

 

             Glucose Lvl (test code = Glucose Lvl) 115          70-99           

          



[facility]2022-03-27 10:12:00





             Test Item    Value        Reference Range Interpretation Comments

 

             BUN (test code = BUN) 18           7-22                      



[place] 483382-03-27 10:12:00





             Test Item    Value        Reference Range Interpretation Comments

 

             Creatinine Lvl (test code = Creatinine 0.78         0.50-1.40      

           



             Lvl)                                                



[place]3382-03-27 10:12:00





             Test Item    Value        Reference Range Interpretation Comments

 

             Sodium Lvl (test code = Sodium Lvl) 138          135-145           

        



[place] 483382-03-27 10:12:00





             Test Item    Value        Reference Range Interpretation Comments

 

             Potassium Lvl (test code = Potassium 4.6          3.5-5.1          

         



             Lvl)                                                



[place] 483382-03-27 10:12:00





             Test Item    Value        Reference Range Interpretation Comments

 

             Chloride Lvl (test code = Chloride Lvl) 111                  

            



[place] 483382-03-27 10:12:00





             Test Item    Value        Reference Range Interpretation Comments

 

             CO2 (test code = CO2) 21           24-32                     



[place] 483382-03-27 10:12:00





             Test Item    Value        Reference Range Interpretation Comments

 

             AGAP (test code = AGAP) 10.6         10.0-20.0                 



[place] 483382-03-27 10:12:00





             Test Item    Value        Reference Range Interpretation Comments

 

             Calcium Lvl (test code = Calcium Lvl) 8.6          8.5-10.5        

          



[place] 483382-03-27 10:12:00





             Test Item    Value        Reference Range Interpretation Comments

 

             eGFR (test code = eGFR) 116                                    



[place] 842952-03-27 10:12:00





             Test Item    Value        Reference Range Interpretation Comments

 

             WBC (test code = WBC) 8.2          3.7-10.4                  



[place] [address]-03-27 10:12:00





             Test Item    Value        Reference Range Interpretation Comments

 

             RBC (test code = RBC) 5.14         4.70-6.10                 



[place] 842952-03-27 10:12:00





             Test Item    Value        Reference Range Interpretation Comments

 

             Hgb (test code = Hgb) 15.5         14.0-18.0                 



[place] 842952-03-27 10:12:00





             Test Item    Value        Reference Range Interpretation Comments

 

             Hct (test code = Hct) 46.0         42.0-54.0                 



[place] [address]-03-27 10:12:00





             Test Item    Value        Reference Range Interpretation Comments

 

             MCV (test code = MCV) 89.5         80.0-94.0                 



[place] [address]-03-27 10:12:00





             Test Item    Value        Reference Range Interpretation Comments

 

             MCH (test code = MCH) 30.2 pg      27.0-31.0                 



[place] [address]-03-27 10:12:00





             Test Item    Value        Reference Range Interpretation Comments

 

             MCHC (test code = MCHC) 33.8         32.0-36.0                 



[place] [address]-03-27 10:12:00





             Test Item    Value        Reference Range Interpretation Comments

 

             RDW (test code = RDW) 15.3         11.5-14.5                 



[place] [address]-03-27 10:12:00





             Test Item    Value        Reference Range Interpretation Comments

 

             Platelet (test code = Platelet) 358          133-450               

    



[address]03-27 10:12:00





             Test Item    Value        Reference Range Interpretation Comments

 

             MPV (test code = MPV) 8.3          7.4-10.4                  



[place] 483382-03-27 10:12:00





             Test Item    Value        Reference Range Interpretation Comments

 

             Glucose Lvl (test code = Glucose Lvl) 115          70-99           

          



[place] 483382-03-27 10:12:00





             Test Item    Value        Reference Range Interpretation Comments

 

             BUN (test code = BUN) 18           7-22                      



[place] 483382-03-27 10:12:00





             Test Item    Value        Reference Range Interpretation Comments

 

             Creatinine Lvl (test code = Creatinine 0.78         0.50-1.40      

           



             Lvl)                                                



[place] 483382-03-27 10:12:00





             Test Item    Value        Reference Range Interpretation Comments

 

             Sodium Lvl (test code = Sodium Lvl) 138          135-145           

        



[place] 483382-03-27 10:12:00





             Test Item    Value        Reference Range Interpretation Comments

 

             Potassium Lvl (test code = Potassium 4.6          3.5-5.1          

         



             Lvl)                                                



[place] 483382-03-27 10:12:00





             Test Item    Value        Reference Range Interpretation Comments

 

             Chloride Lvl (test code = Chloride Lvl) 111                  

            



[place] 483382-03-27 10:12:00





             Test Item    Value        Reference Range Interpretation Comments

 

             CO2 (test code = CO2) 21           24-32                     



[facility]2022-03-27 10:12:00





             Test Item    Value        Reference Range Interpretation Comments

 

             AGAP (test code = AGAP) 10.6         10.0-20.0                 



[facility]2022-03-27 10:12:00





             Test Item    Value        Reference Range Interpretation Comments

 

             Calcium Lvl (test code = Calcium Lvl) 8.6          8.5-10.5        

          



[facility]2022-03-27 10:12:00





             Test Item    Value        Reference Range Interpretation Comments

 

             eGFR (test code = eGFR) 116                                    



[facility]jidplbLRMIEUXQVZ4948[facility]:12:00





             Test Item    Value        Reference Range Interpretation Comments

 

             WBC (test code = WBC) 8.2          3.7-10.4                  



[place] 84[place]52-03-27 10:12:00





             Test Item    Value        Reference Range Interpretation Comments

 

             RBC (test code = RBC) 5.14         4.70-6.10                 



[place] 842952-03-27 10:12:00





             Test Item    Value        Reference Range Interpretation Comments

 

             Hgb (test code = Hgb) 15.5         14.0-18.0                 



[place] 842952-03-27 10:12:00





             Test Item    Value        Reference Range Interpretation Comments

 

             Hct (test code = Hct) 46.0         42.0-54.0                 



[place] 842952-03-27 10:12:00





             Test Item    Value        Reference Range Interpretation Comments

 

             MCV (test code = MCV) 89.5         80.0-94.0                 



[place] [address]42-03-27 10:12:00





             Test Item    Value        Reference Range Interpretation Comments

 

             MCH (test code = MCH) 30.2 pg      27.0-31.0                 



[place] 842952-03-27 10:12:00





             Test Item    Value        Reference Range Interpretation Comments

 

             MCHC (test code = MCHC) 33.8         32.0-36.0                 



[place] 842952-03-27 10:12:00





             Test Item    Value        Reference Range Interpretation Comments

 

             RDW (test code = RDW) 15.3         11.5-14.5                 



[place] 842952-03-27 10:12:00





             Test Item    Value        Reference Range Interpretation Comments

 

             Platelet (test code = Platelet) 358          133-450               

    



[id number]





             Test Item    Value        Reference Range Interpretation Comments

 

             MPV (test code = MPV) 8.3          7.4-10.4                  



[place] 483382-03-27 06:53:00





             Test Item    Value        Reference Range Interpretation Comments

 

             Glucose Lvl (test code = Glucose Lvl) 167          70-99           

          



[place] 483382-03-27 06:53:00





             Test Item    Value        Reference Range Interpretation Comments

 

             BUN (test code = BUN) 16                                 



[place] 483382-03-27 06:53:00





             Test Item    Value        Reference Range Interpretation Comments

 

             Creatinine Lvl (test code = Creatinine 0.99         0.50-1.40      

           



             Lvl)                                                



[place] 483382-03-27 06:53:00





             Test Item    Value        Reference Range Interpretation Comments

 

             Sodium Lvl (test code = Sodium Lvl) 141          135-145           

        



[place] 483382-03-27 06:53:00





             Test Item    Value        Reference Range Interpretation Comments

 

             Potassium Lvl (test code = Potassium 4.5          3.5-5.1          

         



             Lvl)                                                



[place] 483382-03-27 06:53:00





             Test Item    Value        Reference Range Interpretation Comments

 

             Chloride Lvl (test code = Chloride Lvl) 111                  

            



[place] 483382-03-27 06:53:00





             Test Item    Value        Reference Range Interpretation Comments

 

             CO2 (test code = CO2) -                     



[facility]2022-03-27 06:53:00





             Test Item    Value        Reference Range Interpretation Comments

 

             AGAP (test code = AGAP) 12.5         10.0-20.0                 



[place] 483382-03-27 06:53:00





             Test Item    Value        Reference Range Interpretation Comments

 

             Calcium Lvl (test code = Calcium Lvl) 8.6          8.5-10.5        

          



[place] 483382-03-27 06:53:00





             Test Item    Value        Reference Range Interpretation Comments

 

             eGFR (test code = eGFR) 98                                     



[place] 483382-03-27 06:53:00





             Test Item    Value        Reference Range Interpretation Comments

 

             Glucose Lvl (test code = Glucose Lvl) 167          70-99           

          



[facility]2022-03-27 06:53:00





             Test Item    Value        Reference Range Interpretation Comments

 

             BUN (test code = BUN) 16           -                      



[place] 483382-03-27 06:53:00





             Test Item    Value        Reference Range Interpretation Comments

 

             Creatinine Lvl (test code = Creatinine 0.99         0.50-1.40      

           



             Lvl)                                                



[place] 483382-03-27 06:53:00





             Test Item    Value        Reference Range Interpretation Comments

 

             Sodium Lvl (test code = Sodium Lvl) 141          135-145           

        



[place] 483382-03-27 06:53:00





             Test Item    Value        Reference Range Interpretation Comments

 

             Potassium Lvl (test code = Potassium 4.5          3.5-5.1          

         



             Lvl)                                                



[place] 483382-03-27 06:53:00





             Test Item    Value        Reference Range Interpretation Comments

 

             Chloride Lvl (test code = Chloride Lvl) 111                  

            



[place] 483382-03-27 06:53:00





             Test Item    Value        Reference Range Interpretation Comments

 

             CO2 (test code = CO2) 22           24-32                     



[place] 483382-03-27 06:53:00





             Test Item    Value        Reference Range Interpretation Comments

 

             AGAP (test code = AGAP) 12.5         10.0-20.0                 



[place] 483382-03-27 06:53:00





             Test Item    Value        Reference Range Interpretation Comments

 

             Calcium Lvl (test code = Calcium Lvl) 8.6          8.5-10.5        

          



[place] 483382-03-27 06:53:00





             Test Item    Value        Reference Range Interpretation Comments

 

             eGFR (test code = eGFR) 98                                     



[place] 483382-03-27 06:53:00





             Test Item    Value        Reference Range Interpretation Comments

 

             Glucose Lvl (test code = Glucose Lvl) 167          70-99           

          



[place] 483382-03-27 06:53:00





             Test Item    Value        Reference Range Interpretation Comments

 

             BUN (test code = BUN) 16           7-22                      



[place] 483382-03-27 06:53:00





             Test Item    Value        Reference Range Interpretation Comments

 

             Creatinine Lvl (test code = Creatinine 0.99         0.50-1.40      

           



             Lvl)                                                



[place] 483382-03-27 06:53:00





             Test Item    Value        Reference Range Interpretation Comments

 

             Sodium Lvl (test code = Sodium Lvl) 141          135-145           

        



[place] 483382-03-27 06:53:00





             Test Item    Value        Reference Range Interpretation Comments

 

             Potassium Lvl (test code = Potassium 4.5          3.5-5.1          

         



             Lvl)                                                



[place] 483382-03-27 06:53:00





             Test Item    Value        Reference Range Interpretation Comments

 

             Chloride Lvl (test code = Chloride Lvl) 111                  

            



[place] 483382-03-27 06:53:00





             Test Item    Value        Reference Range Interpretation Comments

 

             CO2 (test code = CO2)                      



[place] 483382-03-27 06:53:00





             Test Item    Value        Reference Range Interpretation Comments

 

             AGAP (test code = AGAP) 12.5         10.0-20.0                 



[place] 483382-03-27 06:53:00





             Test Item    Value        Reference Range Interpretation Comments

 

             Calcium Lvl (test code = Calcium Lvl) 8.6          8.5-10.5        

          



[place] 483382-03-27 06:53:00





             Test Item    Value        Reference Range Interpretation Comments

 

             eGFR (test code = eGFR) 98                                     



[place] 483382-03-27 06:53:00





             Test Item    Value        Reference Range Interpretation Comments

 

             Glucose Lvl (test code = Glucose Lvl) 167          70-99           

          



[place] 483382-03-27 06:53:00





             Test Item    Value        Reference Range Interpretation Comments

 

             BUN (test code = BUN) 16           -                      



[place] 483382-03-27 06:53:00





             Test Item    Value        Reference Range Interpretation Comments

 

             Creatinine Lvl (test code = Creatinine 0.99         0.50-1.40      

           



             Lvl)                                                



[place] 483382-03-27 06:53:00





             Test Item    Value        Reference Range Interpretation Comments

 

             Sodium Lvl (test code = Sodium Lvl) 141          135-145           

        



[place] 483382-03-27 06:53:00





             Test Item    Value        Reference Range Interpretation Comments

 

             Potassium Lvl (test code = Potassium 4.5          3.5-5.1          

         



             Lvl)                                                



[place] 483382-03-27 06:53:00





             Test Item    Value        Reference Range Interpretation Comments

 

             Chloride Lvl (test code = Chloride Lvl) 111                  

            



[place] 483382-03-27 06:53:00





             Test Item    Value        Reference Range Interpretation Comments

 

             CO2 (test code = CO2) 22           -                     



[place] 483382-03-27 06:53:00





             Test Item    Value        Reference Range Interpretation Comments

 

             AGAP (test code = AGAP) 12.5         10.0-20.0                 



[place] 483382-03-27 06:53:00





             Test Item    Value        Reference Range Interpretation Comments

 

             Calcium Lvl (test code = Calcium Lvl) 8.6          8.5-10.5        

          



[place] 483382-03-27 06:53:00





             Test Item    Value        Reference Range Interpretation Comments

 

             eGFR (test code = eGFR) 98                                     



[place] 483382-03-27 06:53:00





             Test Item    Value        Reference Range Interpretation Comments

 

             Glucose Lvl (test code = Glucose Lvl) 167          70-99           

          



[place] 483382-03-27 06:53:00





             Test Item    Value        Reference Range Interpretation Comments

 

             BUN (test code = BUN) 16           7-22                      



[place] 483382-03-27 06:53:00





             Test Item    Value        Reference Range Interpretation Comments

 

             Creatinine Lvl (test code = Creatinine 0.99         0.50-1.40      

           



             Lvl)                                                



[place] 483382-03-27 06:53:00





             Test Item    Value        Reference Range Interpretation Comments

 

             Sodium Lvl (test code = Sodium Lvl) 141          135-145           

        



[place] 483382-03-27 06:53:00





             Test Item    Value        Reference Range Interpretation Comments

 

             Potassium Lvl (test code = Potassium 4.5          3.5-5.1          

         



             Lvl)                                                



[place] 483382-03-27 06:53:00





             Test Item    Value        Reference Range Interpretation Comments

 

             Chloride Lvl (test code = Chloride Lvl) 111                  

            



[place] 483382-03-27 06:53:00





             Test Item    Value        Reference Range Interpretation Comments

 

             CO2 (test code = CO2) 22           24-32                     



[place] 483382-03-27 06:53:00





             Test Item    Value        Reference Range Interpretation Comments

 

             AGAP (test code = AGAP) 12.5         10.0-20.0                 



[place] 483382-03-27 06:53:00





             Test Item    Value        Reference Range Interpretation Comments

 

             Calcium Lvl (test code = Calcium Lvl) 8.6          8.5-10.5        

          



[place] 483382-03-27 06:53:00





             Test Item    Value        Reference Range Interpretation Comments

 

             eGFR (test code = eGFR) 98                                     



[place] 483382-03-27 06:53:00





             Test Item    Value        Reference Range Interpretation Comments

 

             Glucose Lvl (test code = Glucose Lvl) 167          70-99           

          



[place] 483382-03-27 06:53:00





             Test Item    Value        Reference Range Interpretation Comments

 

             BUN (test code = BUN) 16           7-22                      



[place] 483382-03-27 06:53:00





             Test Item    Value        Reference Range Interpretation Comments

 

             Creatinine Lvl (test code = Creatinine 0.99         0.50-1.40      

           



             Lvl)                                                



[place] 483382-03-27 06:53:00





             Test Item    Value        Reference Range Interpretation Comments

 

             Sodium Lvl (test code = Sodium Lvl) 141          135-145           

        



[place] 483382-03-27 06:53:00





             Test Item    Value        Reference Range Interpretation Comments

 

             Potassium Lvl (test code = Potassium 4.5          3.5-5.1          

         



             Lvl)                                                



[place] 483382-03-27 06:53:00





             Test Item    Value        Reference Range Interpretation Comments

 

             Chloride Lvl (test code = Chloride Lvl) 111                  

            



[place] 483382-03-27 06:53:00





             Test Item    Value        Reference Range Interpretation Comments

 

             CO2 (test code = CO2) 22           24-32                     



[place] 483382-03-27 06:53:00





             Test Item    Value        Reference Range Interpretation Comments

 

             AGAP (test code = AGAP) 12.5         10.0-20.0                 



[facility]2022-03-27 06:53:00





             Test Item    Value        Reference Range Interpretation Comments

 

             Calcium Lvl (test code = Calcium Lvl) 8.6          8.5-10.5        

          



[facility]2022-03-27 06:53:00





             Test Item    Value        Reference Range Interpretation Comments

 

             eGFR (test code = eGFR) 98                                     



[facility]PT:ISOLATE:ORDQN:[id number] 22:59:00





             Test Item    Value        Reference Range Interpretation Comments

 

             Culture: Urine (test >100,000 CFU/mL Proteus                       

    



             code = Culture: mirabilis >100,000 CFU/mL                          

 



             Urine)       Providencia stuartii                           



[facility]:PT:ISOLATE:ORDQN:[id number] 22:59:00





             Test Item    Value        Reference Range Interpretation Comments

 

             Providencia stuartii Providencia stuartii                          

 



             (test code = Providencia                                        



             stuartii)                                           



[facility]PT:ISOLATE:ORDQN:[id number] 22:59:00





             Test Item    Value        Reference Range Interpretation Comments

 

             Proteus mirabilis (test Proteus mirabilis                          

 



             code = Proteus mirabilis)                                        



[facility] [id number] 22:59:00





             Test Item    Value        Reference Range Interpretation Comments

 

             Culture: Urine (test Holding For Better                           



             code = Culture: Urine) Growth                                 



[facility]C:PT:ISOLATE:ORDQN:[id number] 22:59:00





             Test Item    Value        Reference Range Interpretation Comments

 

             Culture: Urine (test >100,000 CFU/mL Proteus                       

    



             code = Culture: mirabilis >100,000 CFU/mL                          

 



             Urine)       Providencia stuartii                           



[facility] HermannNITROFURANTOIN:SUSC:PT:ISOLATE:ORDQN:[id number] 22:59:00





             Test Item    Value        Reference Range Interpretation Comments

 

             Providencia stuartii Providencia stuartii                          

 



             (test code = Providencia                                        



             stuartii)                                           



[facility] HermannNITROFURANTOIN:SUSC:PT:ISOLATE:ORDQN:[id number] 22:59:00





             Test Item    Value        Reference Range Interpretation Comments

 

             Proteus mirabilis (test Proteus mirabilis                          

 



             code = Proteus mirabilis)                                        



[facility] HermannCulture: [id number] 22:59:00





             Test Item    Value        Reference Range Interpretation Comments

 

             Culture: Urine (test Holding For Better                           



             code = Culture: Urine) Growth                                 



[facility] HermannNITROFURANTOIN:SUSC:PT:ISOLATE:ORDQN:[id number] 22:59:00





             Test Item    Value        Reference Range Interpretation Comments

 

             Culture: Urine (test >100,000 CFU/mL Proteus                       

    



             code = Culture: mirabilis >100,000 CFU/mL                          

 



             Urine)       Providencia stuartii                           



[facility] HermannNITROFURANTOIN:SUSC:PT:ISOLATE:ORDQN:[id number] 22:59:00





             Test Item    Value        Reference Range Interpretation Comments

 

             Providencia stuartii Providencia stuartii                          

 



             (test code = Providencia                                        



             stuartii)                                           



[facility]:PT:ISOLATE:ORDQN:[id number] 22:59:00





             Test Item    Value        Reference Range Interpretation Comments

 

             Proteus mirabilis (test Proteus mirabilis                          

 



             code = Proteus mirabilis)                                        



[facility]: [id number] 22:59:00





             Test Item    Value        Reference Range Interpretation Comments

 

             Culture: Urine (test Holding For Better                           



             code = Culture: Urine) Growth                                 



[facility]USC:PT:ISOLATE:ORDQN:[id number] 22:59:00





             Test Item    Value        Reference Range Interpretation Comments

 

             Culture: Urine (test >100,000 CFU/mL Proteus                       

    



             code = Culture: mirabilis >100,000 CFU/mL                          

 



             Urine)       Providencia stuartii                           



[facility]:PT:ISOLATE:ORDQN:[id number] 22:59:00





             Test Item    Value        Reference Range Interpretation Comments

 

             Providencia stuartii Providencia stuartii                          

 



             (test code = Providencia                                        



             stuartii)                                           



[facility]:PT:ISOLATE:ORDQN:[id number] 22:59:00





             Test Item    Value        Reference Range Interpretation Comments

 

             Proteus mirabilis (test Proteus mirabilis                          

 



             code = Proteus mirabilis)                                        



[facility]: [id number] 22:59:00





             Test Item    Value        Reference Range Interpretation Comments

 

             Culture: Urine (test Holding For Better                           



             code = Culture: Urine) Growth                                 



[facility]USC:PT:ISOLATE:ORDQN:[id number] 22:59:00





             Test Item    Value        Reference Range Interpretation Comments

 

             Culture: Urine (test >100,000 CFU/mL Proteus                       

    



             code = Culture: mirabilis >100,000 CFU/mL                          

 



             Urine)       Providencia stuartii                           



[facility]:PT:ISOLATE:ORDQN:[id number] 22:59:00





             Test Item    Value        Reference Range Interpretation Comments

 

             Providencia stuartii Providencia stuartii                          

 



             (test code = Providencia                                        



             stuartii)                                           



[facility]:PT:ISOLATE:ORDQN:[id number] 22:59:00





             Test Item    Value        Reference Range Interpretation Comments

 

             Proteus mirabilis (test Proteus mirabilis                          

 



             code = Proteus mirabilis)                                        



[facility] [id number] 22:59:00





             Test Item    Value        Reference Range Interpretation Comments

 

             Culture: Urine (test Holding For Better                           



             code = Culture: Urine) Growth                                 



[facility]SUSC:PT:ISOLATE:ORDQN:[id number] 22:59:00





             Test Item    Value        Reference Range Interpretation Comments

 

             Culture: Urine (test >100,000 CFU/mL Proteus                       

    



             code = Culture: mirabilis >100,000 CFU/mL                          

 



             Urine)       Providencia stuartii                           



[facility]US[facility]:PT:ISOLATE:ORDQN:[id number] 22:59:00





             Test Item    Value        Reference Range Interpretation Comments

 

             Providencia stuartii Providencia stuartii                          

 



             (test code = Providencia                                        



             stuartii)                                           



[facility]USC:PT:ISOLATE:ORDQN:[id number] 22:59:00





             Test Item    Value        Reference Range Interpretation Comments

 

             Proteus mirabilis (test Proteus mirabilis                          

 



             code = Proteus mirabilis)                                        



[facility]: [id number] 22:59:00





             Test Item    Value        Reference Range Interpretation Comments

 

             Culture: Urine (test Holding For Better                           



             code = Culture: Urine) Growth                                 



[facility] [id number] 21:03:00





             Test Item    Value        Reference Range Interpretation Comments

 

             Glucose Lvl (test code = Glucose Lvl) 126          70-99           

          



[facility] [id number] 21:03:00





             Test Item    Value        Reference Range Interpretation Comments

 

             BUN (test code = BUN) 16           7-22                      



[facility] [id number] 21:03:00





             Test Item    Value        Reference Range Interpretation Comments

 

             Creatinine Lvl (test code = Creatinine 0.76         0.50-1.40      

           



             Lvl)                                                



[facility] [id number] 21:03:00





             Test Item    Value        Reference Range Interpretation Comments

 

             Sodium Lvl (test code = Sodium Lvl) 140          135-145           

        



[facility] [id number] 21:03:00





             Test Item    Value        Reference Range Interpretation Comments

 

             Potassium Lvl (test code = Potassium 3.3          3.5-5.1          

         



             Lvl)                                                



[place] 483382-03-26 21:03:00





             Test Item    Value        Reference Range Interpretation Comments

 

             Chloride Lvl (test code = Chloride Lvl) 111                  

            



[place] 483382-03-26 21:03:00





             Test Item    Value        Reference Range Interpretation Comments

 

             CO2 (test code = CO2) 22           24-32                     



[place] 483382-03-26 21:03:00





             Test Item    Value        Reference Range Interpretation Comments

 

             Calcium Lvl (test code = Calcium Lvl) 8.5          8.5-10.5        

          



[place] 483382-03-26 21:03:00





             Test Item    Value        Reference Range Interpretation Comments

 

             AGAP (test code = AGAP) 10.3         10.0-20.0                 



[place] 483382-03-26 21:03:00





             Test Item    Value        Reference Range Interpretation Comments

 

             eGFR (test code = eGFR) 117                                    



[place] 483382-03-26 21:03:00





             Test Item    Value        Reference Range Interpretation Comments

 

             Lactic Acid Lvl (test code = Lactic 1.1          0.5-2.2           

        



             Acid Lvl)                                           



[place] 842952-03-26 21:03:00





             Test Item    Value        Reference Range Interpretation Comments

 

             Segs (test code = Segs) 68.9         45.0-75.0                 



[place] 842952-03-26 21:03:00





             Test Item    Value        Reference Range Interpretation Comments

 

             Lymphocytes (test code = Lymphocytes) 20.4         20.0-40.0       

          



[place] 842952-03-26 21:03:00





             Test Item    Value        Reference Range Interpretation Comments

 

             Monocytes (test code = Monocytes) 8.2          2.0-12.0            

      



[place] [address]-03-26 21:03:00





             Test Item    Value        Reference Range Interpretation Comments

 

             Eosinophils (test code = 2.2          See_Comment                [A

utomated message] The



             Eosinophils)                                        system which ge

nerated



                                                                 this result tra

nsmitted



                                                                 reference range

: <=4.0.



                                                                 The reference r

zhen was



                                                                 not used to int

erpret



                                                                 this result as



                                                                 normal/abnormal

.



[place] 842952-03-26 21:03:00





             Test Item    Value        Reference Range Interpretation Comments

 

             Basophils (test code = 0.3          See_Comment                [Aut

omated message] The



             Basophils)                                          system which ge

nerated



                                                                 this result tra

nsmitted



                                                                 reference range

: <=1.0.



                                                                 The reference r

zhen was



                                                                 not used to int

erpret



                                                                 this result as



                                                                 normal/abnormal

.



[place] 842952-03-26 21:03:00





             Test Item    Value        Reference Range Interpretation Comments

 

             Neutrophils # (test code = Neutrophils 5.5          1.5-8.1        

           



             #)                                                  



[place] 842952-03-26 21:03:00





             Test Item    Value        Reference Range Interpretation Comments

 

             Lymphocytes # (test code = Lymphocytes 1.6          1.0-5.5        

           



             #)                                                  



[place] 842952-03-26 21:03:00





             Test Item    Value        Reference Range Interpretation Comments

 

             Monocytes # (test code 0.7          See_Comment                [Aut

omated message] The



             = Monocytes #)                                        system which 

generated



                                                                 this result tra

nsmitted



                                                                 reference range

: <=0.8.



                                                                 The reference r

zhen was



                                                                 not used to int

erpret



                                                                 this result as



                                                                 normal/abnormal

.



[place] 842952-03-26 21:03:00





             Test Item    Value        Reference Range Interpretation Comments

 

             Eosinophils # (test code 0.2          See_Comment                [A

utomated message] The



             = Eosinophils #)                                        system whic

h generated



                                                                 this result tra

nsmitted



                                                                 reference range

: <=0.5.



                                                                 The reference r

zhen was



                                                                 not used to int

erpret



                                                                 this result as



                                                                 normal/abnormal

.



[facility]WqjzgujMLKQJZAYOQ4111[facility]





             Test Item    Value        Reference Range Interpretation Comments

 

             WBC (test code = WBC) 8.0          3.7-10.4                  



[place] 842952-03-26 21:03:00





             Test Item    Value        Reference Range Interpretation Comments

 

             RBC (test code = RBC) 5.37         4.70-6.10                 



[place] [address]-03-26 21:03:00





             Test Item    Value        Reference Range Interpretation Comments

 

             Hgb (test code = Hgb) 15.9         14.0-18.0                 



[place] [address]-03-26 21:03:00





             Test Item    Value        Reference Range Interpretation Comments

 

             Hct (test code = Hct) 47.3         42.0-54.0                 



[place] [address]-03-26 21:03:00





             Test Item    Value        Reference Range Interpretation Comments

 

             MCV (test code = MCV) 88.1         80.0-94.0                 



[place] 842952-03-26 21:03:00





             Test Item    Value        Reference Range Interpretation Comments

 

             MCH (test code = MCH) 29.6 pg      27.0-31.0                 



[facility]951[facility]





             Test Item    Value        Reference Range Interpretation Comments

 

             MCHC (test code = MCHC) 33.6         32.0-36.0                 



[facility]





             Test Item    Value        Reference Range Interpretation Comments

 

             RDW (test code = RDW) 15.3         11.5-14.5                 



[facility]mbgfuhZTICSWEQKE5896-69-86 21:03:00





             Test Item    Value        Reference Range Interpretation Comments

 

             Platelet (test code = Platelet) 370          133-450               

    



[facility] BrookFtwwkpfQLRGYOOJAY1543-67-71 21:03:00





             Test Item    Value        Reference Range Interpretation Comments

 

             MPV (test code = MPV) 8.1          7.4-10.4                  



[facility] AND [id number] 21:03:00





             Test Item    Value        Reference Range Interpretation Comments

 

             UA Comment 1 (test Suboptimal specimen                           



             code = UA Comment 1) received. Results may be                      

     



                          inaccurate due to the                           



                          age of the specimen.                           



                          Interpret results with                           



                          caution.                               



[facility] [id number] 21:03:00





             Test Item    Value        Reference Range Interpretation Comments

 

             UA Color (test code = Yellow *NA*(3/26/22                          

 



             UA Color)    4:03 PM)                               



[facility] AND [id number] 21:03:00





             Test Item    Value        Reference Range Interpretation Comments

 

             UA Turbidity (test code Slight *ABN*(3/26/22                       

    



             = UA Turbidity) 4:03 PM)                               



[facility] AND [id number] 21:03:00





             Test Item    Value        Reference Range Interpretation Comments

 

             UA Spec Grav (test code = UA Spec 1.015 1                          

      



             Grav)                                               



[facility] [id number] 21:03:00





             Test Item    Value        Reference Range Interpretation Comments

 

             UA pH (test code = UA pH) 5.0 1        5.0-8.0                   



[facility] [id number] 21:03:00





             Test Item    Value        Reference Range Interpretation Comments

 

             UA Protein (test code = UA Negative mg/dL                          

 



             Protein)                                            



[facility] AND [id number] 21:03:00





             Test Item    Value        Reference Range Interpretation Comments

 

             UA Glucose (test code = UA Negative mg/dL                          

 



             Glucose)                                            



[facility] [id number] 21:03:00





             Test Item    Value        Reference Range Interpretation Comments

 

             UA Ketones (test code = UA Negative mg/dL                          

 



             Ketones)                                            



[facility] [id number] 21:03:00





             Test Item    Value        Reference Range Interpretation Comments

 

             UA Bili (test code = Negative *NA*(3/26/22                         

  



             UA Bili)     4:03 PM)                               



[facility] [id number] 21:03:00





             Test Item    Value        Reference Range Interpretation Comments

 

             UA Blood (test code = Negative (3/26/22 4:03                       

    



             UA Blood)    PM)                                    



[facility] AND [id number] 21:03:00





             Test Item    Value        Reference Range Interpretation Comments

 

             UA Urobilinogen (test code = UA no gt        0.1-1.0               

    



             Urobilinogen)                                        



[facility] AND [id number] 21:03:00





             Test Item    Value        Reference Range Interpretation Comments

 

             UA Nitrite (test code Negative (3/26/22 4:03                       

    



             = UA Nitrite) PM)                                    



[facility] AND [id number] 21:03:00





             Test Item    Value        Reference Range Interpretation Comments

 

             UA Leuk Est (test code Large *ABN*(3/26/22                         

  



             = UA Leuk Est) 4:03 PM)                               



[facility] AND [id number] 21:03:00





             Test Item    Value        Reference Range Interpretation Comments

 

             UA WBC (test code = 64           See_Comment                [Automa

huma message] The



             UA WBC)                                             system which ge

nerated this



                                                                 result transmit

huma



                                                                 reference range

: <=5. The



                                                                 reference range

 was not



                                                                 used to interpr

et this



                                                                 result as zayda

l/abnormal.



[facility] [id number] 21:03:00





             Test Item    Value        Reference Range Interpretation Comments

 

             UA RBC (test code = 2            See_Comment                [Automa

huma message] The



             UA RBC)                                             system which ge

nerated this



                                                                 result transmit

huma



                                                                 reference range

: <=2. The



                                                                 reference range

 was not



                                                                 used to interpr

et this



                                                                 result as zayda

l/abnormal.



[facility] [id number] 21:03:00





             Test Item    Value        Reference Range Interpretation Comments

 

             UA Mucus (test code = UA Mucus) Few /LPF                           

    



[facility] AND [id number] 21:03:00





             Test Item    Value        Reference Range Interpretation Comments

 

             UA Sq Epi (test code = UA Sq Epi) None Seen                        

      



[place]3382-03-26 21:03:00





             Test Item    Value        Reference Range Interpretation Comments

 

             Glucose Lvl (test code = Glucose Lvl) 126          70-99           

          



[place] 483382-03-26 21:03:00





             Test Item    Value        Reference Range Interpretation Comments

 

             BUN (test code = BUN) 16           7-22                      



[place] 483382-03-26 21:03:00





             Test Item    Value        Reference Range Interpretation Comments

 

             Creatinine Lvl (test code = Creatinine 0.76         0.50-1.40      

           



             Lvl)                                                



[place] 483382-03-26 21:03:00





             Test Item    Value        Reference Range Interpretation Comments

 

             Sodium Lvl (test code = Sodium Lvl) 140          135-145           

        



[place] 483382-03-26 21:03:00





             Test Item    Value        Reference Range Interpretation Comments

 

             Potassium Lvl (test code = Potassium 3.3          3.5-5.1          

         



             Lvl)                                                



[place] 483382-03-26 21:03:00





             Test Item    Value        Reference Range Interpretation Comments

 

             Chloride Lvl (test code = Chloride Lvl) 111                  

            



[place] 483382-03-26 21:03:00





             Test Item    Value        Reference Range Interpretation Comments

 

             CO2 (test code = CO2) 22           24-32                     



[place] 483382-03-26 21:03:00





             Test Item    Value        Reference Range Interpretation Comments

 

             Calcium Lvl (test code = Calcium Lvl) 8.5          8.5-10.5        

          



[place] 483382-03-26 21:03:00





             Test Item    Value        Reference Range Interpretation Comments

 

             AGAP (test code = AGAP) 10.3         10.0-20.0                 



[place] 483382-03-26 21:03:00





             Test Item    Value        Reference Range Interpretation Comments

 

             eGFR (test code = eGFR) 117                                    



[place] 483382-03-26 21:03:00





             Test Item    Value        Reference Range Interpretation Comments

 

             Lactic Acid Lvl (test code = Lactic 1.1          0.5-2.2           

        



             Acid Lvl)                                           



[place] 842952-03-26 21:03:00





             Test Item    Value        Reference Range Interpretation Comments

 

             Segs (test code = Segs) 68.9         45.0-75.0                 



[place] 842952-03-26 21:03:00





             Test Item    Value        Reference Range Interpretation Comments

 

             Lymphocytes (test code = Lymphocytes) 20.4         20.0-40.0       

          



[place] 842952-03-26 21:03:00





             Test Item    Value        Reference Range Interpretation Comments

 

             Monocytes (test code = Monocytes) 8.2          2.0-12.0            

      



[place] 842952-03-26 21:03:00





             Test Item    Value        Reference Range Interpretation Comments

 

             Eosinophils (test code = 2.2          See_Comment                [A

utomated message] The



             Eosinophils)                                        system which ge

nerated



                                                                 this result tra

nsmitted



                                                                 reference range

: <=4.0.



                                                                 The reference r

zhen was



                                                                 not used to int

erpret



                                                                 this result as



                                                                 normal/abnormal

.



[facility] [id number] [facility]:03:00





             Test Item    Value        Reference Range Interpretation Comments

 

             Basophils (test code = 0.3          See_Comment                [Aut

omated message] The



             Basophils)                                          system which ge

nerated



                                                                 this result tra

nsmitted



                                                                 reference range

: <=1.0.



                                                                 The reference r

zhen was



                                                                 not used to int

erpret



                                                                 this result as



                                                                 normal/abnormal

.



[place] 842952-03-26 21:03:00





             Test Item    Value        Reference Range Interpretation Comments

 

             Neutrophils # (test code = Neutrophils 5.5          1.5-8.1        

           



             #)                                                  



[facility] BrookUyumtawVRFEZTQCKZ0337-66-54 21:03:00





             Test Item    Value        Reference Range Interpretation Comments

 

             Lymphocytes # (test code = Lymphocytes 1.6          1.0-5.5        

           



             #)                                                  



[place] 842952-03-26 21:03:00





             Test Item    Value        Reference Range Interpretation Comments

 

             Monocytes # (test code 0.7          See_Comment                [Aut

omated message] The



             = Monocytes #)                                        system which 

generated



                                                                 this result tra

nsmitted



                                                                 reference range

: <=0.8.



                                                                 The reference r

zhen was



                                                                 not used to int

erpret



                                                                 this result as



                                                                 normal/abnormal

.



[facility] BrookNznmmckQYCZKKBMLS2465-42-10 21:03:00





             Test Item    Value        Reference Range Interpretation Comments

 

             Eosinophils # (test code 0.2          See_Comment                [A

utomated message] The



             = Eosinophils #)                                        system whic

h generated



                                                                 this result tra

nsmitted



                                                                 reference range

: <=0.5.



                                                                 The reference r

zhen was



                                                                 not used to int

erpret



                                                                 this result as



                                                                 normal/abnormal

.



[facility] [id number] 21:03:00





             Test Item    Value        Reference Range Interpretation Comments

 

             WBC (test code = WBC) 8.0          3.7-10.4                  



[facility] BrookQolaxthHFESUGYLPD0036-50-65 21:03:00





             Test Item    Value        Reference Range Interpretation Comments

 

             RBC (test code = RBC) 5.37         4.70-6.10                 



[facility] BrookXmivjrzOUKUNODRTP6819-42-68 21:03:00





             Test Item    Value        Reference Range Interpretation Comments

 

             Hgb (test code = Hgb) 15.9         14.0-18.0                 



[facility] BrookRlodiwrZBKOIIAYKV6648-59-10 21:03:00





             Test Item    Value        Reference Range Interpretation Comments

 

             Hct (test code = Hct) 47.3         42.0-54.0                 



[facility] BrookCdcgrncTEIILOMOHC4487-33-71 21:03:00





             Test Item    Value        Reference Range Interpretation Comments

 

             MCV (test code = MCV) 88.1         80.0-94.0                 



[facility] BrookIrifokcWFIGKQJZIF7269-32-88 21:03:00





             Test Item    Value        Reference Range Interpretation Comments

 

             MCH (test code = MCH) 29.6 pg      27.0-31.0                 



[facility] BrookVvplavwRQZYXFDZYF3498-29-54 21:03:00





             Test Item    Value        Reference Range Interpretation Comments

 

             MCHC (test code = MCHC) 33.6         32.0-36.0                 



[facility] BrookWexqcztGDGDSBPHLA3593-68-17 21:03:00





             Test Item    Value        Reference Range Interpretation Comments

 

             RDW (test code = RDW) 15.3         11.5-14.5                 



[facility] BrookWcilwquEHNRSVFFFZ2369-88-59 21:03:00





             Test Item    Value        Reference Range Interpretation Comments

 

             Platelet (test code = Platelet) 370          133-450               

    



[facility] BrookDmukmdlCOYPAJELXJ2444-24-60 21:03:00





             Test Item    Value        Reference Range Interpretation Comments

 

             MPV (test code = MPV) 8.1          7.4-10.4                  



[facility]2022-03-26 21:03:00





             Test Item    Value        Reference Range Interpretation Comments

 

             UA Comment 1 (test Suboptimal specimen                           



             code = UA Comment 1) received. Results may be                      

     



                          inaccurate due to the                           



                          age of the specimen.                           



                          Interpret results with                           



                          caution.                               



[facility]2022-03-26 21:03:00





             Test Item    Value        Reference Range Interpretation Comments

 

             UA Color (test code = Yellow *NA*(3/26/22                          

 



             UA Color)    4:03 PM)                               



[facility]2022-03-26 21:03:00





             Test Item    Value        Reference Range Interpretation Comments

 

             UA Turbidity (test code Slight *ABN*(3/26/22                       

    



             = UA Turbidity) 4:03 PM)                               



[facility] [id number] 21:03:00





             Test Item    Value        Reference Range Interpretation Comments

 

             UA Spec Grav (test code = UA Spec 1.015 1                          

      



             Grav)                                               



[facility] AND [id number] 21:03:00





             Test Item    Value        Reference Range Interpretation Comments

 

             UA pH (test code = UA pH) 5.0 1        5.0-8.0                   



[facility] AND [id number] 21:03:00





             Test Item    Value        Reference Range Interpretation Comments

 

             UA Protein (test code = UA Negative mg/dL                          

 



             Protein)                                            



[facility] AND [id number] 21:03:00





             Test Item    Value        Reference Range Interpretation Comments

 

             UA Glucose (test code = UA Negative mg/dL                          

 



             Glucose)                                            



[facility] AND [id number] 21:03:00





             Test Item    Value        Reference Range Interpretation Comments

 

             UA Ketones (test code = UA Negative mg/dL                          

 



             Ketones)                                            



[facility] AND [id number] 21:03:00





             Test Item    Value        Reference Range Interpretation Comments

 

             UA Bili (test code = Negative *NA*(3/26/22                         

  



             UA Bili)     4:03 PM)                               



[facility] AND [id number] 21:03:00





             Test Item    Value        Reference Range Interpretation Comments

 

             UA Blood (test code = Negative (3/26/22 4:03                       

    



             UA Blood)    PM)                                    



[facility] AND [id number] 21:03:00





             Test Item    Value        Reference Range Interpretation Comments

 

             UA Urobilinogen (test code = UA no gt        0.1-1.0               

    



             Urobilinogen)                                        



[facility] AND [id number] 21:03:00





             Test Item    Value        Reference Range Interpretation Comments

 

             UA Nitrite (test code Negative (3/26/22 4:03                       

    



             = UA Nitrite) PM)                                    



[facility] [id number] 21:03:00





             Test Item    Value        Reference Range Interpretation Comments

 

             UA Leuk Est (test code Large *ABN*(3/26/22                         

  



             = UA Leuk Est) 4:03 PM)                               



[facility] AND [id number] 21:03:00





             Test Item    Value        Reference Range Interpretation Comments

 

             UA WBC (test code = 64           See_Comment                [Automa

huma message] The



             UA WBC)                                             system which ge

nerated this



                                                                 result transmit

huma



                                                                 reference range

: <=5. The



                                                                 reference range

 was not



                                                                 used to interpr

et this



                                                                 result as zayda

l/abnormal.



[facility] AND [id number] 21:03:00





             Test Item    Value        Reference Range Interpretation Comments

 

             UA RBC (test code = 2            See_Comment                [Automa

huma message] The



             UA RBC)                                             system which ge

nerated this



                                                                 result transmit

huma



                                                                 reference range

: <=2. The



                                                                 reference range

 was not



                                                                 used to interpr

et this



                                                                 result as zayda

l/abnormal.



[facility] AND [id number] 21:03:00





             Test Item    Value        Reference Range Interpretation Comments

 

             UA Mucus (test code = UA Mucus) Few /LPF                           

    



[facility] AND [id number] 21:03:00





             Test Item    Value        Reference Range Interpretation Comments

 

             UA Sq Epi (test code = UA Sq Epi) None Seen                        

      



[facility]2022-03-26 21:03:00





             Test Item    Value        Reference Range Interpretation Comments

 

             Glucose Lvl (test code = Glucose Lvl) 126          70-99           

          



[facility]2022-03-26 21:03:00





             Test Item    Value        Reference Range Interpretation Comments

 

             BUN (test code = BUN) 16           7-22                      



[place]3382-03-26 21:03:00





             Test Item    Value        Reference Range Interpretation Comments

 

             Creatinine Lvl (test code = Creatinine 0.76         0.50-1.40      

           



             Lvl)                                                



[place] 483382-03-26 21:03:00





             Test Item    Value        Reference Range Interpretation Comments

 

             Sodium Lvl (test code = Sodium Lvl) 140          135-145           

        



[place]3382-03-26 21:03:00





             Test Item    Value        Reference Range Interpretation Comments

 

             Potassium Lvl (test code = Potassium 3.3          3.5-5.1          

         



             Lvl)                                                



[facility]2022-03-26 21:03:00





             Test Item    Value        Reference Range Interpretation Comments

 

             Chloride Lvl (test code = Chloride Lvl) 111                  

            



[facility]2022-03-26 21:03:00





             Test Item    Value        Reference Range Interpretation Comments

 

             CO2 (test code = CO2) 22           24-32                     



[facility]2022-03-26 21:03:00





             Test Item    Value        Reference Range Interpretation Comments

 

             Calcium Lvl (test code = Calcium Lvl) 8.5          8.5-10.5        

          



[place] 483382-03-26 21:03:00





             Test Item    Value        Reference Range Interpretation Comments

 

             AGAP (test code = AGAP) 10.3         10.0-20.0                 



[place] 483382-03-26 21:03:00





             Test Item    Value        Reference Range Interpretation Comments

 

             eGFR (test code = eGFR) 117                                    



[place] 483382-03-26 21:03:00





             Test Item    Value        Reference Range Interpretation Comments

 

             Lactic Acid Lvl (test code = Lactic 1.1          0.5-2.2           

        



             Acid Lvl)                                           



[place] [address]-03-26 21:03:00





             Test Item    Value        Reference Range Interpretation Comments

 

             Segs (test code = Segs) 68.9         45.0-75.0                 



[place] [address]-03-26 21:03:00





             Test Item    Value        Reference Range Interpretation Comments

 

             Lymphocytes (test code = Lymphocytes) 20.4         20.0-40.0       

          



[place] 842952-03-26 21:03:00





             Test Item    Value        Reference Range Interpretation Comments

 

             Monocytes (test code = Monocytes) 8.2          2.0-12.0            

      



[place] [address]-03-26 21:03:00





             Test Item    Value        Reference Range Interpretation Comments

 

             Eosinophils (test code = 2.2          See_Comment                [A

utomated message] The



             Eosinophils)                                        system which ge

nerated



                                                                 this result tra

nsmitted



                                                                 reference range

: <=4.0.



                                                                 The reference r

zhen was



                                                                 not used to int

erpret



                                                                 this result as



                                                                 normal/abnormal

.



[place] 842952-03-26 21:03:00





             Test Item    Value        Reference Range Interpretation Comments

 

             Basophils (test code = 0.3          See_Comment                [Aut

omated message] The



             Basophils)                                          system which ge

nerated



                                                                 this result tra

nsmitted



                                                                 reference range

: <=1.0.



                                                                 The reference r

zhen was



                                                                 not used to int

erpret



                                                                 this result as



                                                                 normal/abnormal

.



[place] 842952-03-26 21:03:00





             Test Item    Value        Reference Range Interpretation Comments

 

             Neutrophils # (test code = Neutrophils 5.5          1.5-8.1        

           



             #)                                                  



[place] 842952-03-26 21:03:00





             Test Item    Value        Reference Range Interpretation Comments

 

             Lymphocytes # (test code = Lymphocytes 1.6          1.0-5.5        

           



             #)                                                  



[address]03-26 21:03:00





             Test Item    Value        Reference Range Interpretation Comments

 

             Monocytes # (test code 0.7          See_Comment                [Aut

omated message] The



             = Monocytes #)                                        system which 

generated



                                                                 this result tra

nsmitted



                                                                 reference range

: <=0.8.



                                                                 The reference r

zhen was



                                                                 not used to int

erpret



                                                                 this result as



                                                                 normal/abnormal

.



[id number]





             Test Item    Value        Reference Range Interpretation Comments

 

             Eosinophils # (test code 0.2          See_Comment                [A

utomated message] The



             = Eosinophils #)                                        system whic

h generated



                                                                 this result tra

nsmitted



                                                                 reference range

: <=0.5.



                                                                 The reference r

zhen was



                                                                 not used to int

erpret



                                                                 this result as



                                                                 normal/abnormal

.



[facility]03:[facility]





             Test Item    Value        Reference Range Interpretation Comments

 

             WBC (test code = WBC) 8.0          3.7-10.4                  



[place] 842952-03-26 21:03:00





             Test Item    Value        Reference Range Interpretation Comments

 

             RBC (test code = RBC) 5.37         4.70-6.10                 



[place] 842952-03-26 21:03:00





             Test Item    Value        Reference Range Interpretation Comments

 

             Hgb (test code = Hgb) 15.9         14.0-18.0                 



[place] 842952-03-26 21:03:00





             Test Item    Value        Reference Range Interpretation Comments

 

             Hct (test code = Hct) 47.3         42.0-54.0                 



[place] 842952-03-26 21:03:00





             Test Item    Value        Reference Range Interpretation Comments

 

             MCV (test code = MCV) 88.1         80.0-94.0                 



[id number] 21:03:00





             Test Item    Value        Reference Range Interpretation Comments

 

             MCH (test code = MCH) 29.6 pg      27.0-31.0                 



[place] 842952-03-26 21:03:00





             Test Item    Value        Reference Range Interpretation Comments

 

             MCHC (test code = MCHC) 33.6         32.0-36.0                 



[place] 842952-03-26 21:03:00





             Test Item    Value        Reference Range Interpretation Comments

 

             RDW (test code = RDW) 15.3         11.5-14.5                 



[facility]80[facility]





             Test Item    Value        Reference Range Interpretation Comments

 

             Platelet (test code = Platelet) 370          133-450               

    



[id number]





             Test Item    Value        Reference Range Interpretation Comments

 

             MPV (test code = MPV) 8.1          7.4-10.4                  



[facility] [id number] 21:03:00





             Test Item    Value        Reference Range Interpretation Comments

 

             UA Comment 1 (test Suboptimal specimen                           



             code = UA Comment 1) received. Results may be                      

     



                          inaccurate due to the                           



                          age of the specimen.                           



                          Interpret results with                           



                          caution.                               



[facility] AND [id number] 21:03:00





             Test Item    Value        Reference Range Interpretation Comments

 

             UA Color (test code = Yellow *NA*(3/26/22                          

 



             UA Color)    4:03 PM)                               



[facility] [id number] 21:03:00





             Test Item    Value        Reference Range Interpretation Comments

 

             UA Turbidity (test code Slight *ABN*(3/26/22                       

    



             = UA Turbidity) 4:03 PM)                               



[facility] [id number] 21:03:00





             Test Item    Value        Reference Range Interpretation Comments

 

             UA Spec Grav (test code = UA Spec 1.015 1                          

      



             Grav)                                               



[facility] AND [id number] 21:03:00





             Test Item    Value        Reference Range Interpretation Comments

 

             UA pH (test code = UA pH) 5.0 1        5.0-8.0                   



[facility] AND [id number] 21:03:00





             Test Item    Value        Reference Range Interpretation Comments

 

             UA Protein (test code = UA Negative mg/dL                          

 



             Protein)                                            



[facility] AND [id number] 21:03:00





             Test Item    Value        Reference Range Interpretation Comments

 

             UA Glucose (test code = UA Negative mg/dL                          

 



             Glucose)                                            



[facility] AND [id number] 21:03:00





             Test Item    Value        Reference Range Interpretation Comments

 

             UA Ketones (test code = UA Negative mg/dL                          

 



             Ketones)                                            



[facility] AND [id number] 21:03:00





             Test Item    Value        Reference Range Interpretation Comments

 

             UA Bili (test code = Negative *NA*(3/26/22                         

  



             UA Bili)     4:03 PM)                               



[facility] AND [id number] 21:03:00





             Test Item    Value        Reference Range Interpretation Comments

 

             UA Blood (test code = Negative (3/26/22 4:03                       

    



             UA Blood)    PM)                                    



[facility] AND [id number] 21:03:00





             Test Item    Value        Reference Range Interpretation Comments

 

             UA Urobilinogen (test code = UA no gt        0.1-1.0               

    



             Urobilinogen)                                        



[facility] AND [id number] 21:03:00





             Test Item    Value        Reference Range Interpretation Comments

 

             UA Nitrite (test code Negative (3/26/22 4:03                       

    



             = UA Nitrite) PM)                                    



[facility] [id number] 21:03:00





             Test Item    Value        Reference Range Interpretation Comments

 

             UA Leuk Est (test code Large *ABN*(3/26/22                         

  



             = UA Leuk Est) 4:03 PM)                               



[facility] AND [id number] 21:03:00





             Test Item    Value        Reference Range Interpretation Comments

 

             UA WBC (test code = 64           See_Comment                [Automa

huma message] The



             UA WBC)                                             system which ge

nerated this



                                                                 result transmit

huma



                                                                 reference range

: <=5. The



                                                                 reference range

 was not



                                                                 used to interpr

et this



                                                                 result as zayda

l/abnormal.



[facility] AND [id number] 21:03:00





             Test Item    Value        Reference Range Interpretation Comments

 

             UA RBC (test code = 2            See_Comment                [Automa

huma message] The



             UA RBC)                                             system which ge

nerated this



                                                                 result transmit

huma



                                                                 reference range

: <=2. The



                                                                 reference range

 was not



                                                                 used to interpr

et this



                                                                 result as zayda

l/abnormal.



[facility] AND [id number] 21:03:00





             Test Item    Value        Reference Range Interpretation Comments

 

             UA Mucus (test code = UA Mucus) Few /LPF                           

    



[facility] [id number] 21:03:00





             Test Item    Value        Reference Range Interpretation Comments

 

             UA Sq Epi (test code = UA Sq Epi) None Seen                        

      



[facility]2022-03-26 21:03:00





             Test Item    Value        Reference Range Interpretation Comments

 

             Glucose Lvl (test code = Glucose Lvl) 126          70-99           

          



[facility] [id number]03:[facility]





             Test Item    Value        Reference Range Interpretation Comments

 

             BUN (test code = BUN) 16           7-22                      



[facility]2022-03-26 21:03:00





             Test Item    Value        Reference Range Interpretation Comments

 

             Creatinine Lvl (test code = Creatinine 0.76         0.50-1.40      

           



             Lvl)                                                



[place] 483382-03-26 21:03:00





             Test Item    Value        Reference Range Interpretation Comments

 

             Sodium Lvl (test code = Sodium Lvl) 140          135-145           

        



[place]3382-03-26 21:03:00





             Test Item    Value        Reference Range Interpretation Comments

 

             Potassium Lvl (test code = Potassium 3.3          3.5-5.1          

         



             Lvl)                                                



[place]3382-03-26 21:03:00





             Test Item    Value        Reference Range Interpretation Comments

 

             Chloride Lvl (test code = Chloride Lvl) 111                  

            



[place] 483382-03-26 21:03:00





             Test Item    Value        Reference Range Interpretation Comments

 

             CO2 (test code = CO2) 22           24-32                     



[place]3382-03-26 21:03:00





             Test Item    Value        Reference Range Interpretation Comments

 

             Calcium Lvl (test code = Calcium Lvl) 8.5          8.5-10.5        

          



[place]3382-03-26 21:03:00





             Test Item    Value        Reference Range Interpretation Comments

 

             AGAP (test code = AGAP) 10.3         10.0-20.0                 



[place]3382-03-26 21:03:00





             Test Item    Value        Reference Range Interpretation Comments

 

             eGFR (test code = eGFR) 117                                    



[place]3382-03-26 21:03:00





             Test Item    Value        Reference Range Interpretation Comments

 

             Lactic Acid Lvl (test code = Lactic 1.1          0.5-2.2           

        



             Acid Lvl)                                           



[place]2952-03-26 21:03:00





             Test Item    Value        Reference Range Interpretation Comments

 

             Segs (test code = Segs) 68.9         45.0-75.0                 



[place] 842952-03-26 21:03:00





             Test Item    Value        Reference Range Interpretation Comments

 

             Lymphocytes (test code = Lymphocytes) 20.4         20.0-40.0       

          



[place] [address]-03-26 21:03:00





             Test Item    Value        Reference Range Interpretation Comments

 

             Monocytes (test code = Monocytes) 8.2          2.0-12.0            

      



[place] [address]-03-26 21:03:00





             Test Item    Value        Reference Range Interpretation Comments

 

             Eosinophils (test code = 2.2          See_Comment                [A

utomated message] The



             Eosinophils)                                        system which ge

nerated



                                                                 this result tra

nsmitted



                                                                 reference range

: <=4.0.



                                                                 The reference r

zhen was



                                                                 not used to int

erpret



                                                                 this result as



                                                                 normal/abnormal

.



[facility]





             Test Item    Value        Reference Range Interpretation Comments

 

             Basophils (test code = 0.3          See_Comment                [Aut

omated message] The



             Basophils)                                          system which ge

nerated



                                                                 this result tra

nsmitted



                                                                 reference range

: <=1.0.



                                                                 The reference r

zhen was



                                                                 not used to int

erpret



                                                                 this result as



                                                                 normal/abnormal

.



[place] 842952-03-26 21:03:00





             Test Item    Value        Reference Range Interpretation Comments

 

             Neutrophils # (test code = Neutrophils 5.5          1.5-8.1        

           



             #)                                                  



[facility]xsbydrBDDIEFGHBG4848[facility]00-62 [facility]:03:00





             Test Item    Value        Reference Range Interpretation Comments

 

             Lymphocytes # (test code = Lymphocytes 1.6          1.0-5.5        

           



             #)                                                  



[place] 842952-03-26 21:03:00





             Test Item    Value        Reference Range Interpretation Comments

 

             Monocytes # (test code 0.7          See_Comment                [Aut

omated message] The



             = Monocytes #)                                        system which 

generated



                                                                 this result tra

nsmitted



                                                                 reference range

: <=0.8.



                                                                 The reference r

zhen was



                                                                 not used to int

erpret



                                                                 this result as



                                                                 normal/abnormal

.



[facility]myexgnTOZKPDRUGI6085[facility]21-[facility] 21:03:00





             Test Item    Value        Reference Range Interpretation Comments

 

             Eosinophils # (test code 0.2          See_Comment                [A

utomated message] The



             = Eosinophils #)                                        system whic

h generated



                                                                 this result tra

nsmitted



                                                                 reference range

: <=0.5.



                                                                 The reference r

zhen was



                                                                 not used to int

erpret



                                                                 this result as



                                                                 normal/abnormal

.



[facility] BrookDypupvjRCLSSUMVRQ8630-41-97 21:03:00





             Test Item    Value        Reference Range Interpretation Comments

 

             WBC (test code = WBC) 8.0          3.7-10.4                  



[place] 842952-03-26 21:03:00





             Test Item    Value        Reference Range Interpretation Comments

 

             RBC (test code = RBC) 5.37         4.70-6.10                 



[place] 842952-03-26 21:03:00





             Test Item    Value        Reference Range Interpretation Comments

 

             Hgb (test code = Hgb) 15.9         14.0-18.0                 



[place] 842952-03-26 21:03:00





             Test Item    Value        Reference Range Interpretation Comments

 

             Hct (test code = Hct) 47.3         42.0-54.0                 



[facility]





             Test Item    Value        Reference Range Interpretation Comments

 

             MCV (test code = MCV) 88.1         80.0-94.0                 



[facility]npm[facility]upZYIUMLEFMG0224-68-17 21:03:00





             Test Item    Value        Reference Range Interpretation Comments

 

             MCH (test code = MCH) 29.6 pg      27.0-31.0                 



[facility]KpwuekvNVFATFVYHP9080-58-85 21:03:00





             Test Item    Value        Reference Range Interpretation Comments

 

             MCHC (test code = MCHC) 33.6         32.0-36.0                 



[facility] HospitalInmussqRJAPNXCIJN7638-91-95 21:03:00





             Test Item    Value        Reference Range Interpretation Comments

 

             RDW (test code = RDW) 15.3         11.5-14.5                 



[facility]WravdfsCGDAPLYBPG3624-24-00 21:03:00





             Test Item    Value        Reference Range Interpretation Comments

 

             Platelet (test code = Platelet) 370          133-450               

    



[facility] BrookKvcccsjYFDVQWARUZ6092-06-11 21:03:00





             Test Item    Value        Reference Range Interpretation Comments

 

             MPV (test code = MPV) 8.1          7.4-10.4                  



[facility] AND [id number] 21:03:00





             Test Item    Value        Reference Range Interpretation Comments

 

             UA Comment 1 (test Suboptimal specimen                           



             code = UA Comment 1) received. Results may be                      

     



                          inaccurate due to the                           



                          age of the specimen.                           



                          Interpret results with                           



                          caution.                               



[facility] [id number] 21:03:00





             Test Item    Value        Reference Range Interpretation Comments

 

             UA Color (test code = Yellow *NA*(3/26/22                          

 



             UA Color)    4:03 PM)                               



[facility] [id number] 21:03:00





             Test Item    Value        Reference Range Interpretation Comments

 

             UA Turbidity (test code Slight *ABN*(3/26/22                       

    



             = UA Turbidity) 4:03 PM)                               



[facility] [id number] 21:03:00





             Test Item    Value        Reference Range Interpretation Comments

 

             UA Spec Grav (test code = UA Spec 1.015 1                          

      



             Grav)                                               



[facility] [id number] 21:03:00





             Test Item    Value        Reference Range Interpretation Comments

 

             UA pH (test code = UA pH) 5.0 1        5.0-8.0                   



[facility] [id number] 21:03:00





             Test Item    Value        Reference Range Interpretation Comments

 

             UA Protein (test code = UA Negative mg/dL                          

 



             Protein)                                            



[facility] AND [id number] 21:03:00





             Test Item    Value        Reference Range Interpretation Comments

 

             UA Glucose (test code = UA Negative mg/dL                          

 



             Glucose)                                            



[facility] AND [id number] 21:03:00





             Test Item    Value        Reference Range Interpretation Comments

 

             UA Ketones (test code = UA Negative mg/dL                          

 



             Ketones)                                            



[facility] AND [id number] 21:03:00





             Test Item    Value        Reference Range Interpretation Comments

 

             UA Bili (test code = Negative *NA*(3/26/22                         

  



             UA Bili)     4:03 PM)                               



[facility] AND [id number] 21:03:00





             Test Item    Value        Reference Range Interpretation Comments

 

             UA Blood (test code = Negative (3/26/22 4:03                       

    



             UA Blood)    PM)                                    



[facility] AND [id number] 21:03:00





             Test Item    Value        Reference Range Interpretation Comments

 

             UA Urobilinogen (test code = UA no gt        0.1-1.0               

    



             Urobilinogen)                                        



[facility] AND [id number] 21:03:00





             Test Item    Value        Reference Range Interpretation Comments

 

             UA Nitrite (test code Negative (3/26/22 4:03                       

    



             = UA Nitrite) PM)                                    



[facility] AND [id number] 21:03:00





             Test Item    Value        Reference Range Interpretation Comments

 

             UA Leuk Est (test code Large *ABN*(3/26/22                         

  



             = UA Leuk Est) 4:03 PM)                               



[facility] AND [id number] 21:03:00





             Test Item    Value        Reference Range Interpretation Comments

 

             UA WBC (test code = 64           See_Comment                [Automa

huma message] The



             UA WBC)                                             system which ge

nerated this



                                                                 result transmit

huma



                                                                 reference range

: <=5. The



                                                                 reference range

 was not



                                                                 used to interpr

et this



                                                                 result as zayda

l/abnormal.



[facility] AND [id number] 21:03:00





             Test Item    Value        Reference Range Interpretation Comments

 

             UA RBC (test code = 2            See_Comment                [Automa

huma message] The



             UA RBC)                                             system which ge

nerated this



                                                                 result transmit

huma



                                                                 reference range

: <=2. The



                                                                 reference range

 was not



                                                                 used to interpr

et this



                                                                 result as zayda

l/abnormal.



[facility] AND [id number] 21:03:00





             Test Item    Value        Reference Range Interpretation Comments

 

             UA Mucus (test code = UA Mucus) Few /LPF                           

    



[facility] AND [id number] 21:03:00





             Test Item    Value        Reference Range Interpretation Comments

 

             UA Sq Epi (test code = UA Sq Epi) None Seen                        

      



[facility]2022-03-26 21:03:00





             Test Item    Value        Reference Range Interpretation Comments

 

             Glucose Lvl (test code = Glucose Lvl) 126          70-99           

          



[place] 483382-03-26 21:03:00





             Test Item    Value        Reference Range Interpretation Comments

 

             BUN (test code = BUN) 16           7-22                      



[place] 483382-03-26 21:03:00





             Test Item    Value        Reference Range Interpretation Comments

 

             Creatinine Lvl (test code = Creatinine 0.76         0.50-1.40      

           



             Lvl)                                                



[facility]2022-03-26 21:03:00





             Test Item    Value        Reference Range Interpretation Comments

 

             Sodium Lvl (test code = Sodium Lvl) 140          135-145           

        



[place] 483382-03-26 21:03:00





             Test Item    Value        Reference Range Interpretation Comments

 

             Potassium Lvl (test code = Potassium 3.3          3.5-5.1          

         



             Lvl)                                                



[facility]2022-03-26 21:03:00





             Test Item    Value        Reference Range Interpretation Comments

 

             Chloride Lvl (test code = Chloride Lvl) 111                  

            



[place] 483382-03-26 21:03:00





             Test Item    Value        Reference Range Interpretation Comments

 

             CO2 (test code = CO2) 22           24-32                     



[place] 483382-03-26 21:03:00





             Test Item    Value        Reference Range Interpretation Comments

 

             Calcium Lvl (test code = Calcium Lvl) 8.5          8.5-10.5        

          



[facility]2022-03-26 21:03:00





             Test Item    Value        Reference Range Interpretation Comments

 

             AGAP (test code = AGAP) 10.3         10.0-20.0                 



[place] 483382-03-26 21:03:00





             Test Item    Value        Reference Range Interpretation Comments

 

             eGFR (test code = eGFR) 117                                    



[facility]2022-03-26 21:03:00





             Test Item    Value        Reference Range Interpretation Comments

 

             Lactic Acid Lvl (test code = Lactic 1.1          0.5-2.2           

        



             Acid Lvl)                                           



[facility]szkmxeAVSDPNQKII9647[facility]87-51 [facility]:03:00





             Test Item    Value        Reference Range Interpretation Comments

 

             Segs (test code = Segs) 68.9         45.0-75.0                 



[place] 842952-03-26 21:03:00





             Test Item    Value        Reference Range Interpretation Comments

 

             Lymphocytes (test code = Lymphocytes) 20.4         20.0-40.0       

          



[place] 842952-03-26 21:03:00





             Test Item    Value        Reference Range Interpretation Comments

 

             Monocytes (test code = Monocytes) 8.2          2.0-12.0            

      



[place] 842952-03-26 21:03:00





             Test Item    Value        Reference Range Interpretation Comments

 

             Eosinophils (test code = 2.2          See_Comment                [A

utomated message] The



             Eosinophils)                                        system which ge

nerated



                                                                 this result tra

nsmitted



                                                                 reference range

: <=4.0.



                                                                 The reference r

zhen was



                                                                 not used to int

erpret



                                                                 this result as



                                                                 normal/abnormal

.



[place] 842952-03-26 21:03:00





             Test Item    Value        Reference Range Interpretation Comments

 

             Basophils (test code = 0.3          See_Comment                [Aut

omated message] The



             Basophils)                                          system which ge

nerated



                                                                 this result tra

nsmitted



                                                                 reference range

: <=1.0.



                                                                 The reference r

zhen was



                                                                 not used to int

erpret



                                                                 this result as



                                                                 normal/abnormal

.



[place] 842952-03-26 21:03:00





             Test Item    Value        Reference Range Interpretation Comments

 

             Neutrophils # (test code = Neutrophils 5.5          1.5-8.1        

           



             #)                                                  



[place] 842952-03-26 21:03:00





             Test Item    Value        Reference Range Interpretation Comments

 

             Lymphocytes # (test code = Lymphocytes 1.6          1.0-5.5        

           



             #)                                                  



[place] 842952-03-26 21:03:00





             Test Item    Value        Reference Range Interpretation Comments

 

             Monocytes # (test code 0.7          See_Comment                [Aut

omated message] The



             = Monocytes #)                                        system which 

generated



                                                                 this result tra

nsmitted



                                                                 reference range

: <=0.8.



                                                                 The reference r

zhen was



                                                                 not used to int

erpret



                                                                 this result as



                                                                 normal/abnormal

.



[place] 842952-03-26 21:03:00





             Test Item    Value        Reference Range Interpretation Comments

 

             Eosinophils # (test code 0.2          See_Comment                [A

utomated message] The



             = Eosinophils #)                                        system whic

h generated



                                                                 this result tra

nsmitted



                                                                 reference range

: <=0.5.



                                                                 The reference r

zhen was



                                                                 not used to int

erpret



                                                                 this result as



                                                                 normal/abnormal

.



[facility]phczeiUQLTDYFHPQ1120-96-28 21:03:00





             Test Item    Value        Reference Range Interpretation Comments

 

             WBC (test code = WBC) 8.0          3.7-10.4                  



[facility] BrookCulgdbuDMONOPRNIO3052-76-68 21:03:00





             Test Item    Value        Reference Range Interpretation Comments

 

             RBC (test code = RBC) 5.37         4.70-6.10                 



[facility] BrookNounlcpLXLJQWQSEL7939-41-58 21:03:00





             Test Item    Value        Reference Range Interpretation Comments

 

             Hgb (test code = Hgb) 15.9         14.0-18.0                 



[facility] BrookTbrsunkQCWNNJKUHI5752-17-58 21:03:00





             Test Item    Value        Reference Range Interpretation Comments

 

             Hct (test code = Hct) 47.3         42.0-54.0                 



[facility] BrookFuydayfQRTATPRODK8838-13-16 21:03:00





             Test Item    Value        Reference Range Interpretation Comments

 

             MCV (test code = MCV) 88.1         80.0-94.0                 



[facility] BrookGtcdjpkLFNBIQDDXR6878-43-29 21:03:00





             Test Item    Value        Reference Range Interpretation Comments

 

             MCH (test code = MCH) 29.6 pg      27.0-31.0                 



[facility] BrookVgvgzurYFOHNHHBJY0326-95-47 21:03:00





             Test Item    Value        Reference Range Interpretation Comments

 

             MCHC (test code = MCHC) 33.6         32.0-36.0                 



[facility] BrookMmptoicDZVXIFWSDB1886-53-92 21:03:00





             Test Item    Value        Reference Range Interpretation Comments

 

             RDW (test code = RDW) 15.3         11.5-14.5                 



[facility] BrookNpmzjrtIPGDEIIIUP5605-92-17 21:03:00





             Test Item    Value        Reference Range Interpretation Comments

 

             Platelet (test code = Platelet) 370          133-450               

    



[facility] BrookEqkpzbhFRIRNOHLRQ6850-39-75 21:03:00





             Test Item    Value        Reference Range Interpretation Comments

 

             MPV (test code = MPV) 8.1          7.4-10.4                  



[facility]2022-03-26 21:03:00





             Test Item    Value        Reference Range Interpretation Comments

 

             UA Comment 1 (test Suboptimal specimen                           



             code = UA Comment 1) received. Results may be                      

     



                          inaccurate due to the                           



                          age of the specimen.                           



                          Interpret results with                           



                          caution.                               



[facility]2022-03-26 21:03:00





             Test Item    Value        Reference Range Interpretation Comments

 

             UA Color (test code = Yellow *NA*(3/26/22                          

 



             UA Color)    4:03 PM)                               



[facility] AND [id number] 21:03:00





             Test Item    Value        Reference Range Interpretation Comments

 

             UA Turbidity (test code Slight *ABN*(3/26/22                       

    



             = UA Turbidity) 4:03 PM)                               



[facility] AND [id number] 21:03:00





             Test Item    Value        Reference Range Interpretation Comments

 

             UA Spec Grav (test code = UA Spec 1.015 1                          

      



             Grav)                                               



[facility] AND [id number] 21:03:00





             Test Item    Value        Reference Range Interpretation Comments

 

             UA pH (test code = UA pH) 5.0 1        5.0-8.0                   



[facility] AND [id number] 21:03:00





             Test Item    Value        Reference Range Interpretation Comments

 

             UA Protein (test code = UA Negative mg/dL                          

 



             Protein)                                            



[facility] AND [id number] 21:03:00





             Test Item    Value        Reference Range Interpretation Comments

 

             UA Glucose (test code = UA Negative mg/dL                          

 



             Glucose)                                            



[facility] AND [id number] 21:03:00





             Test Item    Value        Reference Range Interpretation Comments

 

             UA Ketones (test code = UA Negative mg/dL                          

 



             Ketones)                                            



[facility] AND [id number] 21:03:00





             Test Item    Value        Reference Range Interpretation Comments

 

             UA Bili (test code = Negative *NA*(3/26/22                         

  



             UA Bili)     4:03 PM)                               



[facility] AND [id number] 21:03:00





             Test Item    Value        Reference Range Interpretation Comments

 

             UA Blood (test code = Negative (3/26/22 4:03                       

    



             UA Blood)    PM)                                    



[facility] AND [id number] 21:03:00





             Test Item    Value        Reference Range Interpretation Comments

 

             UA Urobilinogen (test code = UA no gt        0.1-1.0               

    



             Urobilinogen)                                        



[facility] AND [id number] 21:03:00





             Test Item    Value        Reference Range Interpretation Comments

 

             UA Nitrite (test code Negative (3/26/22 4:03                       

    



             = UA Nitrite) PM)                                    



[facility] AND [id number] 21:03:00





             Test Item    Value        Reference Range Interpretation Comments

 

             UA Leuk Est (test code Large *ABN*(3/26/22                         

  



             = UA Leuk Est) 4:03 PM)                               



[facility] AND [id number] 21:03:00





             Test Item    Value        Reference Range Interpretation Comments

 

             UA WBC (test code = 64           See_Comment                [Automa

huma message] The



             UA WBC)                                             system which ge

nerated this



                                                                 result transmit

huma



                                                                 reference range

: <=5. The



                                                                 reference range

 was not



                                                                 used to interpr

et this



                                                                 result as zayda

l/abnormal.



[facility] AND [id number] 21:03:00





             Test Item    Value        Reference Range Interpretation Comments

 

             UA RBC (test code = 2            See_Comment                [Automa

huma message] The



             UA RBC)                                             system which ge

nerated this



                                                                 result transmit

huma



                                                                 reference range

: <=2. The



                                                                 reference range

 was not



                                                                 used to interpr

et this



                                                                 result as zayda

l/abnormal.



[facility] AND [id number] 21:03:00





             Test Item    Value        Reference Range Interpretation Comments

 

             UA Mucus (test code = UA Mucus) Few /LPF                           

    



[facility] AND [id number] 21:03:00





             Test Item    Value        Reference Range Interpretation Comments

 

             UA Sq Epi (test code = UA Sq Epi) None Seen                        

      



[facility]2022-03-26 21:03:00





             Test Item    Value        Reference Range Interpretation Comments

 

             Glucose Lvl (test code = Glucose Lvl) 126          70-99           

          



[facility]2022-03-26 21:03:00





             Test Item    Value        Reference Range Interpretation Comments

 

             BUN (test code = BUN) 16           7-22                      



[place] 483382-03-26 21:03:00





             Test Item    Value        Reference Range Interpretation Comments

 

             Creatinine Lvl (test code = Creatinine 0.76         0.50-1.40      

           



             Lvl)                                                



[place] 483382-03-26 21:03:00





             Test Item    Value        Reference Range Interpretation Comments

 

             Sodium Lvl (test code = Sodium Lvl) 140          135-145           

        



[place] 483382-03-26 21:03:00





             Test Item    Value        Reference Range Interpretation Comments

 

             Potassium Lvl (test code = Potassium 3.3          3.5-5.1          

         



             Lvl)                                                



[facility]2022-03-26 21:03:00





             Test Item    Value        Reference Range Interpretation Comments

 

             Chloride Lvl (test code = Chloride Lvl) 111                  

            



[id number]





             Test Item    Value        Reference Range Interpretation Comments

 

             CO2 (test code = CO2) 22           24-32                     



[place] 483382-03-26 21:03:00





             Test Item    Value        Reference Range Interpretation Comments

 

             Calcium Lvl (test code = Calcium Lvl) 8.5          8.5-10.5        

          



[place] 483382-03-26 21:03:00





             Test Item    Value        Reference Range Interpretation Comments

 

             AGAP (test code = AGAP) 10.3         10.0-20.0                 



[place] 483382-03-26 21:03:00





             Test Item    Value        Reference Range Interpretation Comments

 

             eGFR (test code = eGFR) 117                                    



[place] 483382-03-26 21:03:00





             Test Item    Value        Reference Range Interpretation Comments

 

             Lactic Acid Lvl (test code = Lactic 1.1          0.5-2.2           

        



             Acid Lvl)                                           



[place] 842952-03-26 21:03:00





             Test Item    Value        Reference Range Interpretation Comments

 

             Segs (test code = Segs) 68.9         45.0-75.0                 



[place] 842952-03-26 21:03:00





             Test Item    Value        Reference Range Interpretation Comments

 

             Lymphocytes (test code = Lymphocytes) 20.4         20.0-40.0       

          



[place] 842952-03-26 21:03:00





             Test Item    Value        Reference Range Interpretation Comments

 

             Monocytes (test code = Monocytes) 8.2          2.0-12.0            

      



[place] 842952-03-26 21:03:00





             Test Item    Value        Reference Range Interpretation Comments

 

             Eosinophils (test code = 2.2          See_Comment                [A

utomated message] The



             Eosinophils)                                        system which ge

nerated



                                                                 this result tra

nsmitted



                                                                 reference range

: <=4.0.



                                                                 The reference r

zhen was



                                                                 not used to int

erpret



                                                                 this result as



                                                                 normal/abnormal

.



[place] 842952-03-26 21:03:00





             Test Item    Value        Reference Range Interpretation Comments

 

             Basophils (test code = 0.3          See_Comment                [Aut

omated message] The



             Basophils)                                          system which ge

nerated



                                                                 this result tra

nsmitted



                                                                 reference range

: <=1.0.



                                                                 The reference r

zhen was



                                                                 not used to int

erpret



                                                                 this result as



                                                                 normal/abnormal

.



[place] 842952-03-26 21:03:00





             Test Item    Value        Reference Range Interpretation Comments

 

             Neutrophils # (test code = Neutrophils 5.5          1.5-8.1        

           



             #)                                                  



[place] 842952-03-26 21:03:00





             Test Item    Value        Reference Range Interpretation Comments

 

             Lymphocytes # (test code = Lymphocytes 1.6          1.0-5.5        

           



             #)                                                  



[place] 842952-03-26 21:03:00





             Test Item    Value        Reference Range Interpretation Comments

 

             Monocytes # (test code 0.7          See_Comment                [Aut

omated message] The



             = Monocytes #)                                        system which 

generated



                                                                 this result tra

nsmitted



                                                                 reference range

: <=0.8.



                                                                 The reference r

zhen was



                                                                 not used to int

erpret



                                                                 this result as



                                                                 normal/abnormal

.



[place] 842952-03-26 21:03:00





             Test Item    Value        Reference Range Interpretation Comments

 

             Eosinophils # (test code 0.2          See_Comment                [A

utomated message] The



             = Eosinophils #)                                        system whic

h generated



                                                                 this result tra

nsmitted



                                                                 reference range

: <=0.5.



                                                                 The reference r

zhen was



                                                                 not used to int

erpret



                                                                 this result as



                                                                 normal/abnormal

.



[place] 842952-03-26 21:03:00





             Test Item    Value        Reference Range Interpretation Comments

 

             WBC (test code = WBC) 8.0          3.7-10.4                  



[place] [address]-03-26 21:03:00





             Test Item    Value        Reference Range Interpretation Comments

 

             RBC (test code = RBC) 5.37         4.70-6.10                 



[place] [address]-03-26 21:03:00





             Test Item    Value        Reference Range Interpretation Comments

 

             Hgb (test code = Hgb) 15.9         14.0-18.0                 



[place] [address]-03-26 21:03:00





             Test Item    Value        Reference Range Interpretation Comments

 

             Hct (test code = Hct) 47.3         42.0-54.0                 



[place] [address]-03-26 21:03:00





             Test Item    Value        Reference Range Interpretation Comments

 

             MCV (test code = MCV) 88.1         80.0-94.0                 



[place] [address]-03-26 21:03:00





             Test Item    Value        Reference Range Interpretation Comments

 

             MCH (test code = MCH) 29.6 pg      27.0-31.0                 



[place] [address]-03-26 21:03:00





             Test Item    Value        Reference Range Interpretation Comments

 

             MCHC (test code = MCHC) 33.6         32.0-36.0                 



[facility]





             Test Item    Value        Reference Range Interpretation Comments

 

             RDW (test code = RDW) 15.3         11.5-14.5                 



[facility]ndknudMAUOTQMTNM4187-82-12 21:03:00





             Test Item    Value        Reference Range Interpretation Comments

 

             Platelet (test code = Platelet) 370          133-450               

    



[id number] 21:03:00





             Test Item    Value        Reference Range Interpretation Comments

 

             MPV (test code = MPV) 8.1          7.4-10.4                  



[facility] AND [id number] 21:03:00





             Test Item    Value        Reference Range Interpretation Comments

 

             UA Comment 1 (test Suboptimal specimen                           



             code = UA Comment 1) received. Results may be                      

     



                          inaccurate due to the                           



                          age of the specimen.                           



                          Interpret results with                           



                          caution.                               



[facility] AND [id number] 21:03:00





             Test Item    Value        Reference Range Interpretation Comments

 

             UA Color (test code = Yellow *NA*(3/26/22                          

 



             UA Color)    4:03 PM)                               



[facility] AND [id number] 21:03:00





             Test Item    Value        Reference Range Interpretation Comments

 

             UA Turbidity (test code Slight *ABN*(3/26/22                       

    



             = UA Turbidity) 4:03 PM)                               



[facility] AND [id number] 21:03:00





             Test Item    Value        Reference Range Interpretation Comments

 

             UA Spec Grav (test code = UA Spec 1.015 1                          

      



             Grav)                                               



[facility] AND [id number] 21:03:00





             Test Item    Value        Reference Range Interpretation Comments

 

             UA pH (test code = UA pH) 5.0 1        5.0-8.0                   



[facility] AND [id number] 21:03:00





             Test Item    Value        Reference Range Interpretation Comments

 

             UA Protein (test code = UA Negative mg/dL                          

 



             Protein)                                            



[facility] AND [id number] 21:03:00





             Test Item    Value        Reference Range Interpretation Comments

 

             UA Glucose (test code = UA Negative mg/dL                          

 



             Glucose)                                            



[facility] AND [id number] 21:03:00





             Test Item    Value        Reference Range Interpretation Comments

 

             UA Ketones (test code = UA Negative mg/dL                          

 



             Ketones)                                            



[facility] AND [id number] 21:03:00





             Test Item    Value        Reference Range Interpretation Comments

 

             UA Bili (test code = Negative *NA*(3/26/22                         

  



             UA Bili)     4:03 PM)                               



[facility] [id number] 21:03:00





             Test Item    Value        Reference Range Interpretation Comments

 

             UA Blood (test code = Negative (3/26/22 4:03                       

    



             UA Blood)    PM)                                    



[facility] [id number] 21:03:00





             Test Item    Value        Reference Range Interpretation Comments

 

             UA Urobilinogen (test code = UA no gt        0.1-1.0               

    



             Urobilinogen)                                        



[facility] [id number] 21:03:00





             Test Item    Value        Reference Range Interpretation Comments

 

             UA Nitrite (test code Negative (3/26/22 4:03                       

    



             = UA Nitrite) PM)                                    



[facility] [id number] 21:03:00





             Test Item    Value        Reference Range Interpretation Comments

 

             UA Leuk Est (test code Large *ABN*(3/26/22                         

  



             = UA Leuk Est) 4:03 PM)                               



[facility] [id number] 21:03:00





             Test Item    Value        Reference Range Interpretation Comments

 

             UA WBC (test code = 64           See_Comment                [Automa

huma message] The



             UA WBC)                                             system which ge

nerated this



                                                                 result transmit

huma



                                                                 reference range

: <=5. The



                                                                 reference range

 was not



                                                                 used to interpr

et this



                                                                 result as zayda

l/abnormal.



[facility] [id number] 21:03:00





             Test Item    Value        Reference Range Interpretation Comments

 

             UA RBC (test code = 2            See_Comment                [Automa

huma message] The



             UA RBC)                                             system which ge

nerated this



                                                                 result transmit

huma



                                                                 reference range

: <=2. The



                                                                 reference range

 was not



                                                                 used to interpr

et this



                                                                 result as zayda

l/abnormal.



[facility] [id number] 21:03:00





             Test Item    Value        Reference Range Interpretation Comments

 

             UA Mucus (test code = UA Mucus) Few /LPF                           

    



[facility] [id number] 21:03:00





             Test Item    Value        Reference Range Interpretation Comments

 

             UA Sq Epi (test code = UA Sq Epi) None Seen                        

      



[facility] [id number] 09:58:00





             Test Item    Value        Reference Range Interpretation Comments

 

             Lactic Acid Lvl (test code = Lactic 2.4          0.5-2.2           

        



             Acid Lvl)                                           



[facility] 09[facility]58[facility]





             Test Item    Value        Reference Range Interpretation Comments

 

             Sed Rate (test code = 13           See_Comment                [Auto

mated message] The



             Sed Rate)                                           system which ge

nerated this



                                                                 result transmit

huma



                                                                 reference range

: <=15. The



                                                                 reference range

 was not



                                                                 used to interpr

et this



                                                                 result as zayda

l/abnormal.



[address]03-26 09:58:00





             Test Item    Value        Reference Range Interpretation Comments

 

             C-REACTIVE PROTEIN (test code = 10.6                               

    



             C-REACTIVE PROTEIN)                                        



[address]03-26 09:58:00





             Test Item    Value        Reference Range Interpretation Comments

 

             Lactic Acid Lvl (test code = Lactic 2.4          0.5-2.2           

        



             Acid Lvl)                                           



[address]03-26 09:58:00





             Test Item    Value        Reference Range Interpretation Comments

 

             Sed Rate (test code = 13           See_Comment                [Auto

mated message] The



             Sed Rate)                                           system which ge

nerated this



                                                                 result transmit

huma



                                                                 reference range

: <=15. The



                                                                 reference range

 was not



                                                                 used to interpr

et this



                                                                 result as zayda

l/abnormal.



[address]03-26 09:58:00





             Test Item    Value        Reference Range Interpretation Comments

 

             C-REACTIVE PROTEIN (test code = 10.6                               

    



             C-REACTIVE PROTEIN)                                        



[address]03-26 09:58:00





             Test Item    Value        Reference Range Interpretation Comments

 

             Lactic Acid Lvl (test code = Lactic 2.4          0.5-2.2           

        



             Acid Lvl)                                           



[address]03-26 09:58:00





             Test Item    Value        Reference Range Interpretation Comments

 

             Sed Rate (test code = 13           See_Comment                [Auto

mated message] The



             Sed Rate)                                           system which ge

nerated this



                                                                 result transmit

huma



                                                                 reference range

: <=15. The



                                                                 reference range

 was not



                                                                 used to interpr

et this



                                                                 result as zayda

l/abnormal.



[address]03-26 09:58:00





             Test Item    Value        Reference Range Interpretation Comments

 

             C-REACTIVE PROTEIN (test code = 10.6                               

    



             C-REACTIVE PROTEIN)                                        



[address]03-26 09:58:00





             Test Item    Value        Reference Range Interpretation Comments

 

             Lactic Acid Lvl (test code = Lactic 2.4          0.5-2.2           

        



             Acid Lvl)                                           



[address]03-26 09:58:00





             Test Item    Value        Reference Range Interpretation Comments

 

             Sed Rate (test code = 13           See_Comment                [Auto

mated message] The



             Sed Rate)                                           system which ge

nerated this



                                                                 result transmit

huma



                                                                 reference range

: <=15. The



                                                                 reference range

 was not



                                                                 used to interpr

et this



                                                                 result as zayda

l/abnormal.



[place] [address]-03-26 09:58:00





             Test Item    Value        Reference Range Interpretation Comments

 

             C-REACTIVE PROTEIN (test code = 10.6                               

    



             C-REACTIVE PROTEIN)                                        



[facility] [id number] 09:58:00





             Test Item    Value        Reference Range Interpretation Comments

 

             Lactic Acid Lvl (test code = Lactic 2.4          0.5-2.2           

        



             Acid Lvl)                                           



[address]03-26 09:58:00





             Test Item    Value        Reference Range Interpretation Comments

 

             Sed Rate (test code = 13           See_Comment                [Auto

mated message] The



             Sed Rate)                                           system which ge

nerated this



                                                                 result transmit

huma



                                                                 reference range

: <=15. The



                                                                 reference range

 was not



                                                                 used to interpr

et this



                                                                 result as zayda

l/abnormal.



[place] [address]-03-26 09:58:00





             Test Item    Value        Reference Range Interpretation Comments

 

             C-REACTIVE PROTEIN (test code = 10.6                               

    



             C-REACTIVE PROTEIN)                                        



[address]03-26 09:58:00





             Test Item    Value        Reference Range Interpretation Comments

 

             Lactic Acid Lvl (test code = Lactic 2.4          0.5-2.2           

        



             Acid Lvl)                                           



[place] [address]-03-26 09:58:00





             Test Item    Value        Reference Range Interpretation Comments

 

             Sed Rate (test code = 13           See_Comment                [Auto

mated message] The



             Sed Rate)                                           system which ge

nerated this



                                                                 result transmit

huma



                                                                 reference range

: <=15. The



                                                                 reference range

 was not



                                                                 used to interpr

et this



                                                                 result as zayda

l/abnormal.



[place] [address]-03-26 09:58:00





             Test Item    Value        Reference Range Interpretation Comments

 

             C-REACTIVE PROTEIN (test code = 10.6                               

    



             C-REACTIVE PROTEIN)                                        



[place] 929332-03-25 18:36:00





             Test Item    Value        Reference Range Interpretation Comments

 

             Protein CSF (test code = Protein CSF) 14           15-45           

          



[place] [id number] 18:36:00





             Test Item    Value        Reference Range Interpretation Comments

 

             Glucose CSF (test code = Glucose CSF) 67           45-80           

          



[place] 929332-03-25 18:36:00





             Test Item    Value        Reference Range Interpretation Comments

 

             Tube Num CSF (test xxxxxxx (3/25/22 1:36                           



             code = Tube Num CSF) PM)                                    



[facility]2022-03-25 18:36:00





             Test Item    Value        Reference Range Interpretation Comments

 

             Color CSF (test code Colorless (3/25/22 1:36                       

    



             = Color CSF) PM)                                    



[facility]2022-03-25 18:36:00





             Test Item    Value        Reference Range Interpretation Comments

 

             Clarity CSF (test code = Clear (3/25/22 1:36                       

    



             Clarity CSF) PM)                                    



[facility]2022-03-25 18:36:00





             Test Item    Value        Reference Range Interpretation Comments

 

             Supernat CSF (test Colorless (3/25/22 1:36                         

  



             code = Supernat CSF) PM)                                    



[facility]2022-03-25 18:36:00





             Test Item    Value        Reference Range Interpretation Comments

 

             Nucleated Cells CSF 0            See_Comment                [Automa

huma message] The



             (test code = Nucleated                                        syste

m which generated



             Cells CSF)                                          this result tra

nsmitted



                                                                 reference range

: <=53.



                                                                 The reference r

zhen was



                                                                 not used to int

erpret



                                                                 this result as



                                                                 normal/abnormal

.



[facility]2022-03-25 18:36:00





             Test Item    Value        Reference Range Interpretation Comments

 

             RBC CSF (test code = 0            See_Comment                [Autom

ated message] The



             RBC CSF)                                            system which ge

nerated this



                                                                 result transmit

huma



                                                                 reference range

: <=03. The



                                                                 reference range

 was not



                                                                 used to interpr

et this



                                                                 result as zayda

l/abnormal.



[facility]2022-03-25 18:36:00





             Test Item    Value        Reference Range Interpretation Comments

 

             Comment CSF (test Differential not                           



             code = Comment CSF) performed on WBC count of                      

     



                          less than 5.                           



[id number]





             Test Item    Value        Reference Range Interpretation Comments

 

             Gram Stain Report Gram Stain Performed By:                         

  



             (test code = Gram [facility]                           



             Stain Report) [facility] w/Gram [id number] 18:36:00





             Test Item    Value        Reference Range Interpretation Comments

 

             Culture: CSF w/Gram Stain (test No Growth                          

    



             code = Culture: CSF w/Gram Stain)                                  

      



[facility]2022-03-25 18:36:00





             Test Item    Value        Reference Range Interpretation Comments

 

             Protein CSF (test code = Protein CSF) 14           15-45           

          



[facility] [id number] 18:36:00





             Test Item    Value        Reference Range Interpretation Comments

 

             Glucose CSF (test code = Glucose CSF) 67           45-80           

          



[facility]2022-03-25 18:36:00





             Test Item    Value        Reference Range Interpretation Comments

 

             Tube Num CSF (test xxxxxxx (3/25/22 1:36                           



             code = Tube Num CSF) PM)                                    



[facility]2022-03-25 18:36:00





             Test Item    Value        Reference Range Interpretation Comments

 

             Color CSF (test code Colorless (3/25/22 1:36                       

    



             = Color CSF) PM)                                    



[facility] [id number] 18:36:00





             Test Item    Value        Reference Range Interpretation Comments

 

             Clarity CSF (test code = Clear (3/25/22 1:36                       

    



             Clarity CSF) PM)                                    



[facility]2022-03-25 18:36:00





             Test Item    Value        Reference Range Interpretation Comments

 

             Supernat CSF (test Colorless (3/25/22 1:36                         

  



             code = Supernat CSF) PM)                                    



[facility]2022-03-25 18:36:00





             Test Item    Value        Reference Range Interpretation Comments

 

             Nucleated Cells CSF 0            See_Comment                [Automa

huma message] The



             (test code = Nucleated                                        syste

m which generated



             Cells CSF)                                          this result tra

nsmitted



                                                                 reference range

: <=53.



                                                                 The reference r

zhen was



                                                                 not used to int

erpret



                                                                 this result as



                                                                 normal/abnormal

.



[facility]2022-03-25 18:36:00





             Test Item    Value        Reference Range Interpretation Comments

 

             RBC CSF (test code = 0            See_Comment                [Autom

ated message] The



             RBC CSF)                                            system which ge

nerated this



                                                                 result transmit

huma



                                                                 reference range

: <=03. The



                                                                 reference range

 was not



                                                                 used to interpr

et this



                                                                 result as zayda

l/abnormal.



[facility] [id number] 18:36:00





             Test Item    Value        Reference Range Interpretation Comments

 

             Comment CSF (test Differential not                           



             code = Comment CSF) performed on WBC count of                      

     



                          less than 5.                           



[facility] [id number] [facility]:36:00





             Test Item    Value        Reference Range Interpretation Comments

 

             Gram Stain Report Gram Stain Performed By:                         

  



             (test code = Gram [facility] [place]                           



             Stain Report) [facility]: CSF w/Gram [id number] 18:36:00





             Test Item    Value        Reference Range Interpretation Comments

 

             Culture: CSF w/Gram Stain (test No Growth                          

    



             code = Culture: CSF w/Gram Stain)                                  

      



[facility]2022-03-25 18:36:00





             Test Item    Value        Reference Range Interpretation Comments

 

             Protein CSF (test code = Protein CSF) 14           15-45           

          



[facility]2022-03-25 18:36:00





             Test Item    Value        Reference Range Interpretation Comments

 

             Glucose CSF (test code = Glucose CSF) 67           45-80           

          



[facility]2022-03-25 18:36:00





             Test Item    Value        Reference Range Interpretation Comments

 

             Tube Num CSF (test xxxxxxx (3/25/22 1:36                           



             code = Tube Num CSF) PM)                                    



[facility]2022-03-25 18:36:00





             Test Item    Value        Reference Range Interpretation Comments

 

             Color CSF (test code Colorless (3/25/22 1:36                       

    



             = Color CSF) PM)                                    



[facility]2022-03-25 18:36:00





             Test Item    Value        Reference Range Interpretation Comments

 

             Clarity CSF (test code = Clear (3/25/22 1:36                       

    



             Clarity CSF) PM)                                    



[facility]2022-03-25 18:36:00





             Test Item    Value        Reference Range Interpretation Comments

 

             Supernat CSF (test Colorless (3/25/22 1:36                         

  



             code = Supernat CSF) PM)                                    



[facility]2022-03-25 18:36:00





             Test Item    Value        Reference Range Interpretation Comments

 

             Nucleated Cells CSF 0            See_Comment                [Automa

huma message] The



             (test code = Nucleated                                        syste

m which generated



             Cells CSF)                                          this result tra

nsmitted



                                                                 reference range

: <=53.



                                                                 The reference r

zhen was



                                                                 not used to int

erpret



                                                                 this result as



                                                                 normal/abnormal

.



[facility]2022-03-25 18:36:00





             Test Item    Value        Reference Range Interpretation Comments

 

             RBC CSF (test code = 0            See_Comment                [Autom

ated message] The



             RBC CSF)                                            system which ge

nerated this



                                                                 result transmit

huma



                                                                 reference range

: <=03. The



                                                                 reference range

 was not



                                                                 used to interpr

et this



                                                                 result as zayda

l/abnormal.



[facility]2022-03-25 18:36:00





             Test Item    Value        Reference Range Interpretation Comments

 

             Comment CSF (test Differential not                           



             code = Comment CSF) performed on WBC count of                      

     



                          less than 5.                           



[facility] [id number] 18:36:00





             Test Item    Value        Reference Range Interpretation Comments

 

             Gram Stain Report Gram Stain Performed By:                         

  



             (test code = Gram [facility]                           



             Stain Report) [facility] CSF w/Gram [id number] 18:36:00





             Test Item    Value        Reference Range Interpretation Comments

 

             Culture: CSF w/Gram Stain (test No Growth                          

    



             code = Culture: CSF w/Gram Stain)                                  

      



[facility] [id number] 18:36:00





             Test Item    Value        Reference Range Interpretation Comments

 

             Protein CSF (test code = Protein CSF) 14           15-45           

          



[facility]2022-03-25 18:36:00





             Test Item    Value        Reference Range Interpretation Comments

 

             Glucose CSF (test code = Glucose CSF) 67           45-80           

          



[facility]2022-03-25 18:36:00





             Test Item    Value        Reference Range Interpretation Comments

 

             Tube Num CSF (test xxxxxxx (3/25/22 1:36                           



             code = Tube Num CSF) PM)                                    



[facility]2022-03-25 18:36:00





             Test Item    Value        Reference Range Interpretation Comments

 

             Color CSF (test code Colorless (3/25/22 1:36                       

    



             = Color CSF) PM)                                    



[facility]2022-03-25 18:36:00





             Test Item    Value        Reference Range Interpretation Comments

 

             Clarity CSF (test code = Clear (3/25/22 1:36                       

    



             Clarity CSF) PM)                                    



[facility]2022-03-25 18:36:00





             Test Item    Value        Reference Range Interpretation Comments

 

             Supernat CSF (test Colorless (3/25/22 1:36                         

  



             code = Supernat CSF) PM)                                    



[facility]2022-03-25 18:36:00





             Test Item    Value        Reference Range Interpretation Comments

 

             Nucleated Cells CSF 0            See_Comment                [Automa

huma message] The



             (test code = Nucleated                                        syste

m which generated



             Cells CSF)                                          this result tra

nsmitted



                                                                 reference range

: <=53.



                                                                 The reference r

zhen was



                                                                 not used to int

erpret



                                                                 this result as



                                                                 normal/abnormal

.



[facility]2022-03-25 18:36:00





             Test Item    Value        Reference Range Interpretation Comments

 

             RBC CSF (test code = 0            See_Comment                [Autom

ated message] The



             RBC CSF)                                            system which ge

nerated this



                                                                 result transmit

huma



                                                                 reference range

: <=03. The



                                                                 reference range

 was not



                                                                 used to interpr

et this



                                                                 result as zayda

l/abnormal.



[facility]2022-03-25 18:36:00





             Test Item    Value        Reference Range Interpretation Comments

 

             Comment CSF (test Differential not                           



             code = Comment CSF) performed on WBC count of                      

     



                          less than 5.                           



[facility] [id number] 18:36:00





             Test Item    Value        Reference Range Interpretation Comments

 

             Gram Stain Report Gram Stain Performed By:                         

  



             (test code = Gram [facility] [place]                           



             Stain Report) [facility] CSF w/Gram [id number] 18:36:00





             Test Item    Value        Reference Range Interpretation Comments

 

             Culture: CSF w/Gram Stain (test No Growth                          

    



             code = Culture: CSF w/Gram Stain)                                  

      



[facility] [id number] 18:36:00





             Test Item    Value        Reference Range Interpretation Comments

 

             Protein CSF (test code = Protein CSF) 14           15-45           

          



[facility]2022-03-25 18:36:00





             Test Item    Value        Reference Range Interpretation Comments

 

             Glucose CSF (test code = Glucose CSF) 67           45-80           

          



[facility]2022-03-25 18:36:00





             Test Item    Value        Reference Range Interpretation Comments

 

             Tube Num CSF (test xxxxxxx (3/25/22 1:36                           



             code = Tube Num CSF) PM)                                    



[facility]2022-03-25 18:36:00





             Test Item    Value        Reference Range Interpretation Comments

 

             Color CSF (test code Colorless (3/25/22 1:36                       

    



             = Color CSF) PM)                                    



[facility]2022-03-25 18:36:00





             Test Item    Value        Reference Range Interpretation Comments

 

             Clarity CSF (test code = Clear (3/25/22 1:36                       

    



             Clarity CSF) PM)                                    



[facility]2022-03-25 18:36:00





             Test Item    Value        Reference Range Interpretation Comments

 

             Supernat CSF (test Colorless (3/25/22 1:36                         

  



             code = Supernat CSF) PM)                                    



[facility]2022-03-25 18:36:00





             Test Item    Value        Reference Range Interpretation Comments

 

             Nucleated Cells CSF 0            See_Comment                [Automa

huma message] The



             (test code = Nucleated                                        syste

m which generated



             Cells CSF)                                          this result tra

nsmitted



                                                                 reference range

: <=53.



                                                                 The reference r

zhen was



                                                                 not used to int

erpret



                                                                 this result as



                                                                 normal/abnormal

.



[facility]2022-03-25 18:36:00





             Test Item    Value        Reference Range Interpretation Comments

 

             RBC CSF (test code = 0            See_Comment                [Autom

ated message] The



             RBC CSF)                                            system which ge

nerated this



                                                                 result transmit

huma



                                                                 reference range

: <=03. The



                                                                 reference range

 was not



                                                                 used to interpr

et this



                                                                 result as zayda

l/abnormal.



[facility] [id number] 18:36:00





             Test Item    Value        Reference Range Interpretation Comments

 

             Comment CSF (test Differential not                           



             code = Comment CSF) performed on WBC count of                      

     



                          less than 5.                           



[facility] [id number] 18[facility]36:00





             Test Item    Value        Reference Range Interpretation Comments

 

             Gram Stain Report Gram Stain Performed By:                         

  



             (test code = Gram [facility]                           



             Stain Report) [facility] w/Gram [id number] 18:36:00





             Test Item    Value        Reference Range Interpretation Comments

 

             Culture: CSF w/Gram Stain (test No Growth                          

    



             code = Culture: CSF w/Gram Stain)                                  

      



[facility]2022-03-25 18:36:00





             Test Item    Value        Reference Range Interpretation Comments

 

             Protein CSF (test code = Protein CSF) 14           15-45           

          



[facility]2022-03-25 18:36:00





             Test Item    Value        Reference Range Interpretation Comments

 

             Glucose CSF (test code = Glucose CSF) 67           45-80           

          



[facility]2022-03-25 18:36:00





             Test Item    Value        Reference Range Interpretation Comments

 

             Tube Num CSF (test xxxxxxx (3/25/22 1:36                           



             code = Tube Num CSF) PM)                                    



[facility]2022-03-25 18:36:00





             Test Item    Value        Reference Range Interpretation Comments

 

             Color CSF (test code Colorless (3/25/22 1:36                       

    



             = Color CSF) PM)                                    



[facility]2022-03-25 18:36:00





             Test Item    Value        Reference Range Interpretation Comments

 

             Clarity CSF (test code = Clear (3/25/22 1:36                       

    



             Clarity CSF) PM)                                    



[facility]2022-03-25 18:36:00





             Test Item    Value        Reference Range Interpretation Comments

 

             Supernat CSF (test Colorless (3/25/22 1:36                         

  



             code = Supernat CSF) PM)                                    



[facility]2022-03-25 18:36:00





             Test Item    Value        Reference Range Interpretation Comments

 

             Nucleated Cells CSF 0            See_Comment                [Automa

huma message] The



             (test code = Nucleated                                        syste

m which generated



             Cells CSF)                                          this result tra

nsmitted



                                                                 reference range

: <=53.



                                                                 The reference r

zhen was



                                                                 not used to int

erpret



                                                                 this result as



                                                                 normal/abnormal

.



[facility] Center[facility] [id number] 18:36:00





             Test Item    Value        Reference Range Interpretation Comments

 

             RBC CSF (test code = 0            See_Comment                [Autom

ated message] The



             RBC CSF)                                            system which ge

nerated this



                                                                 result transmit

huma



                                                                 reference range

: <=03. The



                                                                 reference range

 was not



                                                                 used to interpr

et this



                                                                 result as zayda

l/abnormal.



[facility] [id number] 18:36:00





             Test Item    Value        Reference Range Interpretation Comments

 

             Comment CSF (test Differential not                           



             code = Comment CSF) performed on WBC count of                      

     



                          less than 5.                           



[facility] [id number] 18:36:00





             Test Item    Value        Reference Range Interpretation Comments

 

             Gram Stain Report Gram Stain Performed By:                         

  



             (test code = Gram [facility]                           



             Stain Report) [facility] CSF w/Gram [id number] 18:36:00





             Test Item    Value        Reference Range Interpretation Comments

 

             Culture: CSF w/Gram Stain (test No Growth                          

    



             code = Culture: CSF w/Gram Stain)                                  

      



[facility] C[id number] 08:28:00





             Test Item    Value        Reference Range Interpretation Comments

 

             Coronavirus (COVID-19) Not Detected (3/25/22                       

    



             ZEYAD (test code = 3:28 AM)                               



             Coronavirus (COVID-19)                                        



             ZEYAD)                                                



[facility]JP[facility]UKI0242[facility]-50 08:28:00





             Test Item    Value        Reference Range Interpretation Comments

 

             Coronavirus (COVID-19) Not Detected (3/25/22                       

    



             ZEYAD (test code = 3:28 AM)                               



             Coronavirus (COVID-19)                                        



             ZEYAD)                                                



[facility] CenterYhjpgmtWBBUFFRRBS5395-15-34 08:28:00





             Test Item    Value        Reference Range Interpretation Comments

 

             Coronavirus (COVID-19) Not Detected (3/25/22                       

    



             ZEYAD (test code = 3:28 AM)                               



             Coronavirus (COVID-19)                                        



             ZEYAD)                                                



[place] [address]-03-25 08:28:00





             Test Item    Value        Reference Range Interpretation Comments

 

             Coronavirus (COVID-19) Not Detected (3/25/22                       

    



             ZEYAD (test code = 3:28 AM)                               



             Coronavirus (COVID-19)                                        



             ZEYAD)                                                



[facility] CenterRrqekldHIWFEFQKIT1793-11-81 08:28:00





             Test Item    Value        Reference Range Interpretation Comments

 

             Coronavirus (COVID-19) Not Detected (3/25/22                       

    



             ZEYAD (test code = 3:28 AM)                               



             Coronavirus (COVID-19)                                        



             ZEYAD)                                                



[facility] CenterIncoenoZDDIBEMMQQ3848-47-38 08:28:00





             Test Item    Value        Reference Range Interpretation Comments

 

             Coronavirus (COVID-19) Not Detected (3/25/22                       

    



             ZEYAD (test code = 3:28 AM)                               



             Coronavirus (COVID-19)                                        



             ZEYAD)                                                



[facility] [id number] 06:48:00





             Test Item    Value        Reference Range Interpretation Comments

 

             Antibody Scrn (test Negative (3/25/22 1:48                         

  



             code = Antibody Scrn) AM)                                    



[facility] [id number] 06:48:00





             Test Item    Value        Reference Range Interpretation Comments

 

             ABO/Rh (test code = ABO/Rh) AB POS                                 



[facility] CenterGfzgyigBFYWUKCBTO9163-72-23 06:48:00





             Test Item    Value        Reference Range Interpretation Comments

 

             Segs (test code = Segs) 70.8         45.0-75.0                 



[facility] CenterWhdmlwnYBBCYZFCIF9225-52-53 06:48:00





             Test Item    Value        Reference Range Interpretation Comments

 

             Lymphocytes (test code = Lymphocytes) 20.8         20.0-40.0       

          



[facility] CenterHmvlhvxUBWOOUNVXC5212-01-79 06:48:00





             Test Item    Value        Reference Range Interpretation Comments

 

             Monocytes (test code = Monocytes) 5.8          2.0-12.0            

      



[facility] CenterBnbfxfdFQDGNPXNEI7885-25-75 06:48:00





             Test Item    Value        Reference Range Interpretation Comments

 

             Eosinophils (test code = 2.3          See_Comment                [A

utomated message] The



             Eosinophils)                                        system which ge

nerated



                                                                 this result tra

nsmitted



                                                                 reference range

: <=4.0.



                                                                 The reference r

zhen was



                                                                 not used to int

erpret



                                                                 this result as



                                                                 normal/abnormal

.



[facility] HospitalFpncvvwNWXCMBWEAC9263-07-90 06:48:00





             Test Item    Value        Reference Range Interpretation Comments

 

             Basophils (test code = 0.3          See_Comment                [Aut

omated message] The



             Basophils)                                          system which ge

nerated



                                                                 this result tra

nsmitted



                                                                 reference range

: <=1.0.



                                                                 The reference r

zhen was



                                                                 not used to int

erpret



                                                                 this result as



                                                                 normal/abnormal

.



[facility] CenterEyynzlkHFFCHALFHW5832-54-42 06:48:00





             Test Item    Value        Reference Range Interpretation Comments

 

             Neutrophils # (test code = Neutrophils 8.4          1.5-8.1        

           



             #)                                                  



[facility]SsqfyvqRTOSYFGBIM0856-54-92 06:48:00





             Test Item    Value        Reference Range Interpretation Comments

 

             Lymphocytes # (test code = Lymphocytes 2.5          1.0-5.5        

           



             #)                                                  



[facility] BrookJvzzaweXLAGUAKQZO2669-80-09 06:48:00





             Test Item    Value        Reference Range Interpretation Comments

 

             Monocytes # (test code 0.7          See_Comment                [Aut

omated message] The



             = Monocytes #)                                        system which 

generated



                                                                 this result tra

nsmitted



                                                                 reference range

: <=0.8.



                                                                 The reference r

zhen was



                                                                 not used to int

erpret



                                                                 this result as



                                                                 normal/abnormal

.



[facility] BrookJlzukvdEQLCFTESRY9506-06-32 06:48:00





             Test Item    Value        Reference Range Interpretation Comments

 

             Eosinophils # (test code 0.3          See_Comment                [A

utomated message] The



             = Eosinophils #)                                        system whic

h generated



                                                                 this result tra

nsmitted



                                                                 reference range

: <=0.5.



                                                                 The reference r

zhen was



                                                                 not used to int

erpret



                                                                 this result as



                                                                 normal/abnormal

.



[facility] BrookUvnnbdmZCJAVDBCTR4166-06-89 06:48:00





             Test Item    Value        Reference Range Interpretation Comments

 

             WBC X 10x3 (test code = WBC X 10x3) 11.9         3.7-10.4          

        



[place] 842952-03-25 06:48:00





             Test Item    Value        Reference Range Interpretation Comments

 

             RBC X 10x6 (test code = RBC X 10x6) 5.95         4.70-6.10         

        



[facility] BrookYspgxftJWIYNLYOWY5645-64-75 06:48:00





             Test Item    Value        Reference Range Interpretation Comments

 

             Hgb (test code = Hgb) 17.9         14.0-18.0                 



[place] 842952-03-25 06:48:00





             Test Item    Value        Reference Range Interpretation Comments

 

             Hct (test code = Hct) 52.0         42.0-54.0                 



[place] 842952-03-25 06:48:00





             Test Item    Value        Reference Range Interpretation Comments

 

             MCV (test code = MCV) 87.5         80.0-94.0                 



[place] [address]-03-25 06:48:00





             Test Item    Value        Reference Range Interpretation Comments

 

             MCH (test code = MCH) 30.2 pg      27.0-31.0                 



[place] 842952-03-25 06:48:00





             Test Item    Value        Reference Range Interpretation Comments

 

             MCHC (test code = MCHC) 34.5         32.0-36.0                 



[facility] BrookRpzbxmfDXWCWZKOSB6896-31-79 06:48:00





             Test Item    Value        Reference Range Interpretation Comments

 

             RDW (test code = RDW) 15.4         11.5-14.5                 



[facility] BrookUrpominGSXIPOJLYK3290-18-91 06:48:00





             Test Item    Value        Reference Range Interpretation Comments

 

             Platelet (test code = Platelet) 414          133-450               

    



[facility] BrookYjxhmygSEIIJZHTXB8638-85-00 06:48:00





             Test Item    Value        Reference Range Interpretation Comments

 

             MPV (test code = MPV) 8.5          7.4-10.4                  



[facility] BrookSljdrqsOHIIUAWXUO0008-50-71 06:48:00





             Test Item    Value        Reference Range Interpretation Comments

 

             PT (test code = PT) 12.9 s       12.0-14.7                 



[facility] BrookDpbxmkpVLSABJBHZM5874-40-66 06:48:00





             Test Item    Value        Reference Range Interpretation Comments

 

             INR (test code = INR) 0.98 1       0.85-1.17                 



[facility] BrookZjupdybFOZANZFDHH9506-95-91 06:48:00





             Test Item    Value        Reference Range Interpretation Comments

 

             PTT (test code = PTT) 31.4 s       22.9-35.8                 



[facility] WMCLPOZ2042-11-10 06:48:00





             Test Item    Value        Reference Range Interpretation Comments

 

             Antibody Scrn (test Negative (3/25/22 1:48                         

  



             code = Antibody Scrn) AM)                                    



[facility] [id number] 06:48:00





             Test Item    Value        Reference Range Interpretation Comments

 

             ABO/Rh (test code = ABO/Rh) AB POS                                 



[facility] BrookVvkgqzkWVWBDRKRXC5955-03-44 06:48:00





             Test Item    Value        Reference Range Interpretation Comments

 

             Segs (test code = Segs) 70.8         45.0-75.0                 



[facility] BrookBrotxtxJSJYSMTMYF6542-85-15 06:48:00





             Test Item    Value        Reference Range Interpretation Comments

 

             Lymphocytes (test code = Lymphocytes) 20.8         20.0-40.0       

          



[place] 842952-03-25 06:48:00





             Test Item    Value        Reference Range Interpretation Comments

 

             Monocytes (test code = Monocytes) 5.8          2.0-12.0            

      



[facility] BrookSefrexfJYYSKWNCAD1000-42-16 06:48:00





             Test Item    Value        Reference Range Interpretation Comments

 

             Eosinophils (test code = 2.3          See_Comment                [A

utomated message] The



             Eosinophils)                                        system which ge

nerated



                                                                 this result tra

nsmitted



                                                                 reference range

: <=4.0.



                                                                 The reference r

zhen was



                                                                 not used to int

erpret



                                                                 this result as



                                                                 normal/abnormal

.



[facility] BrookRfbhrwqSXLPRBLHRC0673-55-40 06:48:00





             Test Item    Value        Reference Range Interpretation Comments

 

             Basophils (test code = 0.3          See_Comment                [Aut

omated message] The



             Basophils)                                          system which ge

nerated



                                                                 this result tra

nsmitted



                                                                 reference range

: <=1.0.



                                                                 The reference r

zhen was



                                                                 not used to int

erpret



                                                                 this result as



                                                                 normal/abnormal

.



[facility] BrookMpbqzvzWBSMZSXGKU4175-53-24 06:48:00





             Test Item    Value        Reference Range Interpretation Comments

 

             Neutrophils # (test code = Neutrophils 8.4          1.5-8.1        

           



             #)                                                  



[facility] BrookMjtazlyEAAEDBJFDR7865-13-38 06:48:00





             Test Item    Value        Reference Range Interpretation Comments

 

             Lymphocytes # (test code = Lymphocytes 2.5          1.0-5.5        

           



             #)                                                  



[facility] BrookHnyggboZPEEDHNYKQ4990-05-96 06:48:00





             Test Item    Value        Reference Range Interpretation Comments

 

             Monocytes # (test code 0.7          See_Comment                [Aut

omated message] The



             = Monocytes #)                                        system which 

generated



                                                                 this result tra

nsmitted



                                                                 reference range

: <=0.8.



                                                                 The reference r

zhen was



                                                                 not used to int

erpret



                                                                 this result as



                                                                 normal/abnormal

.



[facility] BrookIhtvvrtVCSPDMSPCO6567-02-75 06:48:00





             Test Item    Value        Reference Range Interpretation Comments

 

             Eosinophils # (test code 0.3          See_Comment                [A

utomated message] The



             = Eosinophils #)                                        system whic

h generated



                                                                 this result tra

nsmitted



                                                                 reference range

: <=0.5.



                                                                 The reference r

zhen was



                                                                 not used to int

erpret



                                                                 this result as



                                                                 normal/abnormal

.



[facility] BrookWbnsswgBKICRBMKYF4729-37-34 06:48:00





             Test Item    Value        Reference Range Interpretation Comments

 

             WBC X 10x3 (test code = WBC X 10x3) 11.9         3.7-10.4          

        



[facility] BrookZwcukovEOQYUQNQDU0668-65-63 06:48:00





             Test Item    Value        Reference Range Interpretation Comments

 

             RBC X 10x6 (test code = RBC X 10x6) 5.95         4.70-6.10         

        



[facility] BrookBsmcgxvRTISNVJRDW9989-48-58 06:48:00





             Test Item    Value        Reference Range Interpretation Comments

 

             Hgb (test code = Hgb) 17.9         14.0-18.0                 



[place] 842952-03-25 06:48:00





             Test Item    Value        Reference Range Interpretation Comments

 

             Hct (test code = Hct) 52.0         42.0-54.0                 



[facility] BrookPfmijjsAPDNMFEHCM8450-47-88 06:48:00





             Test Item    Value        Reference Range Interpretation Comments

 

             MCV (test code = MCV) 87.5         80.0-94.0                 



[facility] BrookKheewudVTJXYBTOMB1772-47-69 06:48:00





             Test Item    Value        Reference Range Interpretation Comments

 

             MCH (test code = MCH) 30.2 pg      27.0-31.0                 



[id number] [facility]48[facility]00





             Test Item    Value        Reference Range Interpretation Comments

 

             MCHC (test code = MCHC) 34.5         32.0-36.0                 



[facility] CenterUkukzsmJLFACOOQNK9715-66-10 06:48:00





             Test Item    Value        Reference Range Interpretation Comments

 

             RDW (test code = RDW) 15.4         11.5-14.5                 



[facility] CenterHrmvofcWTKGONAFZU9200-34-49 06:48:00





             Test Item    Value        Reference Range Interpretation Comments

 

             Platelet (test code = Platelet) 414          133-450               

    



[facility] CenterDqqoncdRQDSAMLXTJ7219-63-16 06:48:00





             Test Item    Value        Reference Range Interpretation Comments

 

             MPV (test code = MPV) 8.5          7.4-10.4                  



[facility] CenterNdwgyruUJYPHMJKCO4837-01-43 06:48:00





             Test Item    Value        Reference Range Interpretation Comments

 

             PT (test code = PT) 12.9 s       12.0-14.7                 



[facility] CenterNqutkmoCYFEQCVNXE0948-46-94 06:48:00





             Test Item    Value        Reference Range Interpretation Comments

 

             INR (test code = INR) 0.98 1       0.85-1.17                 



[facility] CenterIujhpxqWHMXEZMVNB3520-57-99 06:48:00





             Test Item    Value        Reference Range Interpretation Comments

 

             PTT (test code = PTT) 31.4 s       22.9-35.8                 



[facility] [place] [employer] [id number] 06:48:00





             Test Item    Value        Reference Range Interpretation Comments

 

             Antibody Scrn (test Negative (3/25/22 1:48                         

  



             code = Antibody Scrn) AM)                                    



[facility] [place] [employer] [id number] 06:48:00





             Test Item    Value        Reference Range Interpretation Comments

 

             ABO/Rh (test code = ABO/Rh) AB POS                                 



[facility]REY[facility]RSB0938[facility] 06:48:00





             Test Item    Value        Reference Range Interpretation Comments

 

             Segs (test code = Segs) 70.8         45.0-75.0                 



[place]2952-03-25 06:48:00





             Test Item    Value        Reference Range Interpretation Comments

 

             Lymphocytes (test code = Lymphocytes) 20.8         20.0-40.0       

          



[place] [address]-03-25 06:48:00





             Test Item    Value        Reference Range Interpretation Comments

 

             Monocytes (test code = Monocytes) 5.8          2.0-12.0            

      



[place] [address]-03-25 06:48:00





             Test Item    Value        Reference Range Interpretation Comments

 

             Eosinophils (test code = 2.3          See_Comment                [A

utomated message] The



             Eosinophils)                                        system which ge

nerated



                                                                 this result tra

nsmitted



                                                                 reference range

: <=4.0.



                                                                 The reference r

zhen was



                                                                 not used to int

erpret



                                                                 this result as



                                                                 normal/abnormal

.



[place] [address]-03-25 06:48:00





             Test Item    Value        Reference Range Interpretation Comments

 

             Basophils (test code = 0.3          See_Comment                [Aut

omated message] The



             Basophils)                                          system which ge

nerated



                                                                 this result tra

nsmitted



                                                                 reference range

: <=1.0.



                                                                 The reference r

zhen was



                                                                 not used to int

erpret



                                                                 this result as



                                                                 normal/abnormal

.



[place] 842952-03-25 06:48:00





             Test Item    Value        Reference Range Interpretation Comments

 

             Neutrophils # (test code = Neutrophils 8.4          1.5-8.1        

           



             #)                                                  



[place] [address]-03-25 06:48:00





             Test Item    Value        Reference Range Interpretation Comments

 

             Lymphocytes # (test code = Lymphocytes 2.5          1.0-5.5        

           



             #)                                                  



[place] [address]-03-25 06:48:00





             Test Item    Value        Reference Range Interpretation Comments

 

             Monocytes # (test code 0.7          See_Comment                [Aut

omated message] The



             = Monocytes #)                                        system which 

generated



                                                                 this result tra

nsmitted



                                                                 reference range

: <=0.8.



                                                                 The reference r

hzen was



                                                                 not used to int

erpret



                                                                 this result as



                                                                 normal/abnormal

.



[place] 842952-03-25 06:48:00





             Test Item    Value        Reference Range Interpretation Comments

 

             Eosinophils # (test code 0.3          See_Comment                [A

utomated message] The



             = Eosinophils #)                                        system [facility]

h generated



                                                                 this result tra

nsmitted



                                                                 reference range

: <=0.5.



                                                                 The reference r

zhen was



                                                                 not used to int

erpret



                                                                 this result as



                                                                 normal/abnormal

.



[facility]KwwxftjNLXKCFILJH0630[facility]





             Test Item    Value        Reference Range Interpretation Comments

 

             WBC X 10x3 (test code = WBC X 10x3) 11.9         3.7-10.4          

        



[place] 842952-03-25 06:48:00





             Test Item    Value        Reference Range Interpretation Comments

 

             RBC X 10x6 (test code = RBC X 10x6) 5.95         4.70-6.10         

        



[place] 842952-03-25 06:48:00





             Test Item    Value        Reference Range Interpretation Comments

 

             Hgb (test code = Hgb) 17.9         14.0-18.0                 



[place] [address]-03-25 06:48:00





             Test Item    Value        Reference Range Interpretation Comments

 

             Hct (test code = Hct) 52.0         42.0-54.0                 



[place] [address]-03-25 06:48:00





             Test Item    Value        Reference Range Interpretation Comments

 

             MCV (test code = MCV) 87.5         80.0-94.0                 



[place] 842952-03-25 06:48:00





             Test Item    Value        Reference Range Interpretation Comments

 

             MCH (test code = MCH) 30.2 pg      27.0-31.0                 



[place] 842952-03-25 06:48:00





             Test Item    Value        Reference Range Interpretation Comments

 

             MCHC (test code = MCHC) 34.5         32.0-36.0                 



[place] 842952-03-25 06:48:00





             Test Item    Value        Reference Range Interpretation Comments

 

             RDW (test code = RDW) 15.4         11.5-14.5                 



[place] 842952-03-25 06:48:00





             Test Item    Value        Reference Range Interpretation Comments

 

             Platelet (test code = Platelet) 414          133-450               

    



[place] 842952-03-25 06:48:00





             Test Item    Value        Reference Range Interpretation Comments

 

             MPV (test code = MPV) 8.5          7.4-10.4                  



[place] 842952-03-25 06:48:00





             Test Item    Value        Reference Range Interpretation Comments

 

             PT (test code = PT) 12.9 s       12.0-14.7                 



[place] 842952-03-25 06:48:00





             Test Item    Value        Reference Range Interpretation Comments

 

             INR (test code = INR) 0.98 1       0.85-1.17                 



[facility] BrookSffuwwdXRXRYWQUBF5139-66-57 06:48:00





             Test Item    Value        Reference Range Interpretation Comments

 

             PTT (test code = PTT) 31.4 s       22.9-35.8                 



[facility] [id number] 06:48:00





             Test Item    Value        Reference Range Interpretation Comments

 

             Antibody Scrn (test Negative (3/25/22 1:48                         

  



             code = Antibody Scrn) AM)                                    



[facility] [id number] 06:48:00





             Test Item    Value        Reference Range Interpretation Comments

 

             ABO/Rh (test code = ABO/Rh) AB POS                                 



[facility] BrookYhavzscPIEAJFUIHG2635-44-15 06:48:00





             Test Item    Value        Reference Range Interpretation Comments

 

             Segs (test code = Segs) 70.8         45.0-75.0                 



[facility] BrookIpvvdaqILGEVJGHLX4148-24-24 06:48:00





             Test Item    Value        Reference Range Interpretation Comments

 

             Lymphocytes (test code = Lymphocytes) 20.8         20.0-40.0       

          



[facility] BrookXwvqukjEEJRWZARXP6173-49-29 06:48:00





             Test Item    Value        Reference Range Interpretation Comments

 

             Monocytes (test code = Monocytes) 5.8          2.0-12.0            

      



[facility] BrookFekytptUMZLDQSXEJ3280-77-66 06:48:00





             Test Item    Value        Reference Range Interpretation Comments

 

             Eosinophils (test code = 2.3          See_Comment                [A

utomated message] The



             Eosinophils)                                        system which ge

nerated



                                                                 this result tra

nsmitted



                                                                 reference range

: <=4.0.



                                                                 The reference r

zhen was



                                                                 not used to int

erpret



                                                                 this result as



                                                                 normal/abnormal

.



[facility] BrookLqrhjdzCTOTZRTIEP7101-68-83 06:48:00





             Test Item    Value        Reference Range Interpretation Comments

 

             Basophils (test code = 0.3          See_Comment                [Aut

omated message] The



             Basophils)                                          system which ge

nerated



                                                                 this result tra

nsmitted



                                                                 reference range

: <=1.0.



                                                                 The reference r

zhen was



                                                                 not used to int

erpret



                                                                 this result as



                                                                 normal/abnormal

.



[facility] BrookMslmtewQPVBHKNIFE7840-27-18 06:48:00





             Test Item    Value        Reference Range Interpretation Comments

 

             Neutrophils # (test code = Neutrophils 8.4          1.5-8.1        

           



             #)                                                  



[facility] BrookQfuplbvYTJVTUKBAO4176-04-44 06:48:00





             Test Item    Value        Reference Range Interpretation Comments

 

             Lymphocytes # (test code = Lymphocytes 2.5          1.0-5.5        

           



             #)                                                  



[facility] BrookQgsjiskTXZZFFCORJ7588-89-50 06:48:00





             Test Item    Value        Reference Range Interpretation Comments

 

             Monocytes # (test code 0.7          See_Comment                [Aut

omated message] The



             = Monocytes #)                                        system which 

generated



                                                                 this result tra

nsmitted



                                                                 reference range

: <=0.8.



                                                                 The reference r

zhen was



                                                                 not used to int

erpret



                                                                 this result as



                                                                 normal/abnormal

.



[facility] BrookWbygitsDWEQMMCHWD3314-60-14 06:48:00





             Test Item    Value        Reference Range Interpretation Comments

 

             Eosinophils # (test code 0.3          See_Comment                [A

utomated message] The



             = Eosinophils #)                                        system whic

h generated



                                                                 this result tra

nsmitted



                                                                 reference range

: <=0.5.



                                                                 The reference r

zhen was



                                                                 not used to int

erpret



                                                                 this result as



                                                                 normal/abnormal

.



[facility] BrookZzojxjhFAXQHDLVVT5541-70-39 06:48:00





             Test Item    Value        Reference Range Interpretation Comments

 

             WBC X 10x3 (test code = WBC X 10x3) 11.9         3.7-10.4          

        



[place] 842952-03-25 06:48:00





             Test Item    Value        Reference Range Interpretation Comments

 

             RBC X 10x6 (test code = RBC X 10x6) 5.95         4.70-6.10         

        



[place] 842952-03-25 06:48:00





             Test Item    Value        Reference Range Interpretation Comments

 

             Hgb (test code = Hgb) 17.9         14.0-18.0                 



[place] 842952-03-25 06:48:00





             Test Item    Value        Reference Range Interpretation Comments

 

             Hct (test code = Hct) 52.0         42.0-54.0                 



[place] 842952-03-25 06:48:00





             Test Item    Value        Reference Range Interpretation Comments

 

             MCV (test code = MCV) 87.5         80.0-94.0                 



[place] [address]-03-25 06:48:00





             Test Item    Value        Reference Range Interpretation Comments

 

             MCH (test code = MCH) 30.2 pg      27.0-31.0                 



[facility] BrookSkveixkBTTFFFNBCP8605-54-35 06:48:00





             Test Item    Value        Reference Range Interpretation Comments

 

             MCHC (test code = MCHC) 34.5         32.0-36.0                 



[place] 842952-03-25 06:48:00





             Test Item    Value        Reference Range Interpretation Comments

 

             RDW (test code = RDW) 15.4         11.5-14.5                 



[facility] CenterLfzahbzOVGNLRLRUE1038-14-71 06:48:00





             Test Item    Value        Reference Range Interpretation Comments

 

             Platelet (test code = Platelet) 414          133-450               

    



[facility] BrookOdixjffLXAXPUROLE8973-28-46 06:48:00





             Test Item    Value        Reference Range Interpretation Comments

 

             MPV (test code = MPV) 8.5          7.4-10.4                  



[facility] BrookMnwasrfJDBLGJDTVX1799-21-12 06:48:00





             Test Item    Value        Reference Range Interpretation Comments

 

             PT (test code = PT) 12.9 s       12.0-14.7                 



[facility]KubwtpwMRXVTYCTMU3651-81-93 06:48:00





             Test Item    Value        Reference Range Interpretation Comments

 

             INR (test code = INR) 0.98 1       0.85-1.17                 



[facility] CenterLxhfkpbCGPQPGVKRR6090-74-80 06:48:00





             Test Item    Value        Reference Range Interpretation Comments

 

             PTT (test code = PTT) 31.4 s       22.9-35.8                 



[facility] [place] [employer] [id number] 06:48:00





             Test Item    Value        Reference Range Interpretation Comments

 

             Antibody Scrn (test Negative (3/25/22 1:48                         

  



             code = Antibody Scrn) AM)                                    



[facility]Appe[facility] [employer] PNRSSON0985-29-72 06:48:00





             Test Item    Value        Reference Range Interpretation Comments

 

             ABO/Rh (test code = ABO/Rh) AB POS                                 



[facility] CenterRsormpbTGXXFFPKVV4155-19-20 06:48:00





             Test Item    Value        Reference Range Interpretation Comments

 

             Segs (test code = Segs) 70.8         45.0-75.0                 



[facility] CenterGdaakrnBJWLMXDDQE6805-97-44 06:48:00





             Test Item    Value        Reference Range Interpretation Comments

 

             Lymphocytes (test code = Lymphocytes) 20.8         20.0-40.0       

          



[facility] HospitalDhclsfhVTBGJRWOSD1493-33-11 06:48:00





             Test Item    Value        Reference Range Interpretation Comments

 

             Monocytes (test code = Monocytes) 5.8          2.0-12.0            

      



[facility] HospitalFtnaebmSOJDREZHOI9522-71-11 06:48:00





             Test Item    Value        Reference Range Interpretation Comments

 

             Eosinophils (test code = 2.3          See_Comment                [A

utomated message] The



             Eosinophils)                                        system which ge

nerated



                                                                 this result tra

nsmitted



                                                                 reference range

: <=4.0.



                                                                 The reference r

zhen was



                                                                 not used to int

erpret



                                                                 this result as



                                                                 normal/abnormal

.



[place] 842952-03-25 06:48:00





             Test Item    Value        Reference Range Interpretation Comments

 

             Basophils (test code = 0.3          See_Comment                [Aut

omated message] The



             Basophils)                                          system which ge

nerated



                                                                 this result tra

nsmitted



                                                                 reference range

: <=1.0.



                                                                 The reference r

zhen was



                                                                 not used to int

erpret



                                                                 this result as



                                                                 normal/abnormal

.



[place] 842952-03-25 06:48:00





             Test Item    Value        Reference Range Interpretation Comments

 

             Neutrophils # (test code = Neutrophils 8.4          1.5-8.1        

           



             #)                                                  



[place] 842952-03-25 06:48:00





             Test Item    Value        Reference Range Interpretation Comments

 

             Lymphocytes # (test code = Lymphocytes 2.5          1.0-5.5        

           



             #)                                                  



[place] 842952-03-25 06:48:00





             Test Item    Value        Reference Range Interpretation Comments

 

             Monocytes # (test code 0.7          See_Comment                [Aut

omated message] The



             = Monocytes #)                                        system which 

generated



                                                                 this result tra

nsmitted



                                                                 reference range

: <=0.8.



                                                                 The reference r

zhen was



                                                                 not used to int

erpret



                                                                 this result as



                                                                 normal/abnormal

.



[facility] BrookSnarjibJBUFYTTBXM9217-46-64 06:48:00





             Test Item    Value        Reference Range Interpretation Comments

 

             Eosinophils # (test code 0.3          See_Comment                [A

utomated message] The



             = Eosinophils #)                                        system whic

h generated



                                                                 this result tra

nsmitted



                                                                 reference range

: <=0.5.



                                                                 The reference r

zhen was



                                                                 not used to int

erpret



                                                                 this result as



                                                                 normal/abnormal

.



[facility] BrookAxuullaMZKIYRTQWR4040-34-85 06:48:00





             Test Item    Value        Reference Range Interpretation Comments

 

             WBC X 10x3 (test code = WBC X 10x3) 11.9         3.7-10.4          

        



[place] 842952-03-25 06:48:00





             Test Item    Value        Reference Range Interpretation Comments

 

             RBC X 10x6 (test code = RBC X 10x6) 5.95         4.70-6.10         

        



[place] 842952-03-25 06:48:00





             Test Item    Value        Reference Range Interpretation Comments

 

             Hgb (test code = Hgb) 17.9         14.0-18.0                 



[place] 842952-03-25 06:48:00





             Test Item    Value        Reference Range Interpretation Comments

 

             Hct (test code = Hct) 52.0         42.0-54.0                 



[facility] HospitalLvpcdvqKLDJJEOZTU4657-15-44 06:48:00





             Test Item    Value        Reference Range Interpretation Comments

 

             MCV (test code = MCV) 87.5         80.0-94.0                 



[facility]





             Test Item    Value        Reference Range Interpretation Comments

 

             MCH (test code = MCH) 30.2 pg      27.0-31.0                 



[facility] VhwrswxUGPJZLKSDQ6066-23-98 06:48:00





             Test Item    Value        Reference Range Interpretation Comments

 

             MCHC (test code = MCHC) 34.5         32.0-36.0                 



[facility]09[facility] 06:48:00





             Test Item    Value        Reference Range Interpretation Comments

 

             RDW (test code = RDW) 15.4         11.5-14.5                 



[facility] OpearxxNYPBBILLJK8301-17-84 06:48:00





             Test Item    Value        Reference Range Interpretation Comments

 

             Platelet (test code = Platelet) 414          133-450               

    



[facility]letWGPYMJBQBP5588[facility]59-84 06:48:00





             Test Item    Value        Reference Range Interpretation Comments

 

             MPV (test code = MPV) 8.5          7.4-10.4                  



[facility]cDVPBCLJNZJ5788-39-40 06:48:00





             Test Item    Value        Reference Range Interpretation Comments

 

             PT (test code = PT) 12.9 s       12.0-14.7                 



[facility]pvmubqSKUMJXQEDZ2724-25-94 06:48:00





             Test Item    Value        Reference Range Interpretation Comments

 

             INR (test code = INR) 0.98 1       0.85-1.17                 



[facility]tg[facility]PTRHSMGBVO9093-94-44 06:48:00





             Test Item    Value        Reference Range Interpretation Comments

 

             PTT (test code = PTT) 31.4 s       22.9-35.8                 



[facility] [employer] [id number] 06:48:00





             Test Item    Value        Reference Range Interpretation Comments

 

             Antibody Scrn (test Negative (3/25/22 1:48                         

  



             code = Antibody Scrn) AM)                                    



[employer] [id number] 06:48:00





             Test Item    Value        Reference Range Interpretation Comments

 

             ABO/Rh (test code = ABO/Rh) AB POS                                 



[facility] Hospital[facility]fthhdbOGKYKCPXKQ7192-36-18 06:48:00





             Test Item    Value        Reference Range Interpretation Comments

 

             Segs (test code = Segs) 70.8         45.0-75.0                 



[place] 842952-03-25 06:48:00





             Test Item    Value        Reference Range Interpretation Comments

 

             Lymphocytes (test code = Lymphocytes) 20.8         20.0-40.0       

          



[place] 842952-03-25 06:48:00





             Test Item    Value        Reference Range Interpretation Comments

 

             Monocytes (test code = Monocytes) 5.8          2.0-12.0            

      



[place] 842952-03-25 06:48:00





             Test Item    Value        Reference Range Interpretation Comments

 

             Eosinophils (test code = 2.3          See_Comment                [A

utomated message] The



             Eosinophils)                                        system which ge

nerated



                                                                 this result tra

nsmitted



                                                                 reference range

: <=4.0.



                                                                 The reference r

zhen was



                                                                 not used to int

erpret



                                                                 this result as



                                                                 normal/abnormal

.



[place] 842952-03-25 06:48:00





             Test Item    Value        Reference Range Interpretation Comments

 

             Basophils (test code = 0.3          See_Comment                [Aut

omated message] The



             Basophils)                                          system which ge

nerated



                                                                 this result tra

nsmitted



                                                                 reference range

: <=1.0.



                                                                 The reference r

zhen was



                                                                 not used to int

erpret



                                                                 this result as



                                                                 normal/abnormal

.



[facility]





             Test Item    Value        Reference Range Interpretation Comments

 

             Neutrophils # (test code = Neutrophils 8.4          1.5-8.1        

           



             #)                                                  



[place] 842952-03-25 06:48:00





             Test Item    Value        Reference Range Interpretation Comments

 

             Lymphocytes # (test code = Lymphocytes 2.5          1.0-5.5        

           



             #)                                                  



[place] 842952-03-25 06:48:00





             Test Item    Value        Reference Range Interpretation Comments

 

             Monocytes # (test code 0.7          See_Comment                [Aut

omated message] The



             = Monocytes #)                                        system which 

generated



                                                                 this result tra

nsmitted



                                                                 reference range

: <=0.8.



                                                                 The reference r

zhen was



                                                                 not used to int

erpret



                                                                 this result as



                                                                 normal/abnormal

.



[place] [address]-03-25 06:48:00





             Test Item    Value        Reference Range Interpretation Comments

 

             Eosinophils # (test code 0.3          See_Comment                [A

utomated message] The



             = Eosinophils #)                                        system whic

h generated



                                                                 this result tra

nsmitted



                                                                 reference range

: <=0.5.



                                                                 The reference r

zhen was



                                                                 not used to int

erpret



                                                                 this result as



                                                                 normal/abnormal

.



[place] 842952-03-25 06:48:00





             Test Item    Value        Reference Range Interpretation Comments

 

             WBC X 10x3 (test code = WBC X 10x3) 11.9         3.7-10.4          

        



[place] 842952-03-25 06:48:00





             Test Item    Value        Reference Range Interpretation Comments

 

             RBC X 10x6 (test code = RBC X 10x6) 5.95         4.70-6.10         

        



[place] 842952-03-25 06:48:00





             Test Item    Value        Reference Range Interpretation Comments

 

             Hgb (test code = Hgb) 17.9         14.0-18.0                 



[place] [address]-03-25 06:48:00





             Test Item    Value        Reference Range Interpretation Comments

 

             Hct (test code = Hct) 52.0         42.0-54.0                 



[place] 842952-03-25 06:48:00





             Test Item    Value        Reference Range Interpretation Comments

 

             MCV (test code = MCV) 87.5         80.0-94.0                 



[place] [address]-03-25 06:48:00





             Test Item    Value        Reference Range Interpretation Comments

 

             MCH (test code = MCH) 30.2 pg      27.0-31.0                 



[place] 842952-03-25 06:48:00





             Test Item    Value        Reference Range Interpretation Comments

 

             MCHC (test code = MCHC) 34.5         32.0-36.0                 



[place] 842952-03-25 06:48:00





             Test Item    Value        Reference Range Interpretation Comments

 

             RDW (test code = RDW) 15.4         11.5-14.5                 



[place] 842952-03-25 06:48:00





             Test Item    Value        Reference Range Interpretation Comments

 

             Platelet (test code = Platelet) 414          133-450               

    



[facility]TCUNTAWUPY[facility]75[facility]





             Test Item    Value        Reference Range Interpretation Comments

 

             MPV (test code = MPV) 8.5          7.4-10.4                  



[place] [address]-03-25 06:48:00





             Test Item    Value        Reference Range Interpretation Comments

 

             PT (test code = PT) 12.9 s       12.0-14.7                 



[place] 842952-03-25 06:48:00





             Test Item    Value        Reference Range Interpretation Comments

 

             INR (test code = INR) 0.98 1       0.85-1.17                 



[place] 842952-03-25 06:48:00





             Test Item    Value        Reference Range Interpretation Comments

 

             PTT (test code = PTT) 31.4 s       22.9-35.8                 



[facility] WITH [id number] 00:23:28





             Test Item    Value        Reference Range Interpretation Comments

 

             WBC (test code =              See_Comment  H             [Automated



             6690-2)                                             message] The sy

stem



                                                                 which generated



                                                                 this result



                                                                 transmitted



                                                                 reference range

:



                                                                 4.20 - 10.70



                                                                 10*3/?L. The



                                                                 reference range

 was



                                                                 not used to



                                                                 interpret this



                                                                 result as



                                                                 normal/abnormal

.

 

             RBC (test code =              See_Comment  H             [Automated



             789-8)                                              message] The sy

stem



                                                                 which generated



                                                                 this result



                                                                 transmitted



                                                                 reference range

:



                                                                 4.26 - 5.52



                                                                 10*6/?L. The



                                                                 reference range

 was



                                                                 not used to



                                                                 interpret this



                                                                 result as



                                                                 normal/abnormal

.

 

             HGB (test code = 18.3 g/dL    12.2-16.4    H            



             718-7)                                              

 

             HCT (test code = 55.6 %       38.4-49.3    H            



             4544-3)                                             

 

             MCV (test code = 89.0 fL      81.7-95.6                 



             787-2)                                              

 

             MCH (test code = 29.3 pg      26.1-32.7                 



             785-6)                                              

 

             MCHC (test code = 32.9 g/dL    31.2-35.0                 



             786-4)                                              

 

             RDW-SD (test code = 47.6 fL      38.5-51.6                 



             53239-7)                                            

 

             RDW-CV (test code = 15.1 %       12.1-15.4                 



             788-0)                                              

 

             PLT (test code =              See_Comment  H             [Automated



             777-3)                                              message] The sy

stem



                                                                 which generated



                                                                 this result



                                                                 transmitted



                                                                 reference range

:



                                                                 150 - 328 10*3/

?L.



                                                                 The reference r

zhen



                                                                 was not used to



                                                                 interpret this



                                                                 result as



                                                                 normal/abnormal

.

 

             MPV (test code = 10.5 fL      9.8-13.0                  



             20045-1)                                            

 

             NRBC/100 WBC (test              See_Comment                [Automat

ed



             code = [id number])                                        message] 

The system



                                                                 which generated



                                                                 this result



                                                                 transmitted



                                                                 reference range

:



                                                                 0.0 - 10.0 /100



                                                                 WBCs. The refer

ence



                                                                 range was not u

sed



                                                                 to interpret th

is



                                                                 result as



                                                                 normal/abnormal

.

 

             NRBC x10^3 (test code <0.01        See_Comment                [Auto

mated



             = [id number])                                        message] The s

ystem



                                                                 which generated



                                                                 this result



                                                                 transmitted



                                                                 reference range

:



                                                                 10*3/?L. The



                                                                 reference range

 was



                                                                 not used to



                                                                 interpret this



                                                                 result as



                                                                 normal/abnormal

.

 

             GRAN MAT (NEUT) % 66.7 %                                 



             (test code = 770-8)                                        

 

             IMM GRAN % (test code 0.40 %                                 



             = [id number])                                        

 

             LYMPH % (test code = 24.4 %                                 



             736-9)                                              

 

             MONO % (test code = 6.6 %                                  



             5905-5)                                             

 

             EOS % (test code = 1.5 %                                  



             713-8)                                              

 

             BASO % (test code = 0.4 %                                  



             706-2)                                              

 

             GRAN MAT x10^3(ANC) 7.43 10*3/uL 1.99-6.95    H            



             (test code =                                        



             [id number])                                         

 

             IMM GRAN x10^3 (test 0.04 10*3/uL 0.00-0.06                 



             code = 4656228981)                                        

 

             LYMPH x10^3 (test code 2.72 10*3/uL 1.09-3.23                 



             = 731-0)                                            

 

             MONO x10^3 (test code 0.74 10*3/uL 0.36-1.02                 



             = 742-7)                                            

 

             EOS x10^3 (test code = 0.17 10*3/uL 0.06-0.53                 



             711-2)                                              

 

             BASO x10^3 (test code 0.04 10*3/uL 0.01-0.09                 



             = 704-7)                                            

 

             Lab Interpretation Abnormal                               



             (test code = 56856-6)                                        



[facility]. METABOLIC PANEL [id number] 
00:18:07





             Test Item    Value        Reference Range Interpretation Comments

 

             NA (test code = 139 mmol/L   135-145                   



             8044922718)                                         

 

             K (test code = 4.8 mmol/L   3.5-5.0                   



             9232593232)                                         

 

             CL (test code = 104 mmol/L                       



             7651463086)                                         

 

             CO2 TOTAL (test code = 22 mmol/L    23-31        L            



             2316830847)                                         

 

             AGAP (test code =              2-16                      



             9797098522)                                         

 

             BUN (test code = 15 mg/dL     7-23                      



             8539394186)                                         

 

             GLUCOSE (test code = 93 mg/dL                         



             1417176073)                                         

 

             CREATININE (test code = 0.67 mg/dL   0.60-1.25                 



             6644255673)                                         

 

             TOTAL BILI (test code = 0.7 mg/dL    0.1-1.1                   



             5261956049)                                         

 

             CALCIUM (test code = 9.8 mg/dL    8.6-10.6                  



             1462143001)                                         

 

             T PROTEIN (test code = 8.9 g/dL     6.3-8.2      H            



             5056306406)                                         

 

             ALBUMIN (test code = 4.9 g/dL     3.5-5.0                   



             9829564929)                                         

 

             ALK PHOS (test code = 126 U/L             H            



             7284729169)                                         

 

             ALTv (test code = 43 U/L       5-50                      



             1742-6)                                             

 

             AST(SGOT) (test code = 28 U/L       13-40                     



             0838577966)                                         

 

             eGFR (test code =              mL/min/1.73m2              



             2002601046)                                         

 

             MAL (test code = MAL) Association of                           



                          Glomerular Filtration                           



                          Rate (GFR) and Staging                           



                          of Kidney Disease*                           



                          +---------------------                           



                          --+-------------------                           



                          --+-------------------                           



                          ------+| GFR                           



                          (mL/min/1.73 m2) ?|                           



                          With Kidney Damage ?|                           



                          ?Without Kidney                           



                          Damage+---------------                           



                          --------+-------------                           



                          --------+-------------                           



                          ------------+| ?>90 ?                           



                          ? ? ? ? ? ? ? ?|                           



                          ?Stage one ? ? ? ? ?|                           



                          ? Normal ? ? ? ? ? ? ?                           



                          ?+--------------------                           



                          ---+------------------                           



                          ---+------------------                           



                          -------+| ?60-89 ? ? ?                           



                          ? ? ? ? ?| ?Stage two                           



                          ? ? ? ? ?| ? Decreased                           



                          GFR ? ? ? ?                            



                          +---------------------                           



                          --+-------------------                           



                          --+-------------------                           



                          ------+| ?30-59 ? ? ?                           



                          ? ? ? ? ?| ?Stage                           



                          three ? ? ? ?| ? Stage                           



                          three ? ? ? ? ?                           



                          +---------------------                           



                          --+-------------------                           



                          --+-------------------                           



                          ------+| ?15-29 ? ? ?                           



                          ? ? ? ? ?| ?Stage four                           



                          ? ? ? ? | ? Stage four                           



                          ? ? ? ? ?                              



                          ?+--------------------                           



                          ---+------------------                           



                          ---+------------------                           



                          -------+| ?<15 (or                           



                          dialysis) ? ?| ?Stage                           



                          five ? ? ? ? | ? Stage                           



                          five ? ? ? ? ?                           



                          ?+--------------------                           



                          ---+------------------                           



                          ---+------------------                           



                          -------+ *Each stage                           



                          assumes the associated                           



                          GFR level has been in                           



                          effect for at least                           



                          three months. ?Stages                           



                          1 to 5, with or                           



                          without kidney                           



                          disease, indicate                           



                          chronic kidney                           



                          disease. Notes:                           



                          Determination of                           



                          stages one and two                           



                          (with eGFR                             



                          >59mL/min/1.73 m2)                           



                          requires estimation of                           



                          kidney damage for at                           



                          least three months as                           



                          defined by structural                           



                          or functional                           



                          abnormalities of the                           



                          kidney, manifested by                           



                          either:Pathological                           



                          abnormalities or                           



                          Markers of kidney                           



                          damage (including                           



                          abnormalities in the                           



                          composition of the                           



                          blood or urine or                           



                          abnormalities in                           



                          imaging tests).                           

 

             Lab Interpretation Abnormal                               



             (test code = 05815-0)                                        



[facility]. METABOLIC PANEL ([id number] 
12:48:40





             Test Item    Value        Reference Range Interpretation Comments

 

             NA (test code = 137 mmol/L   135-145                   



             [id number])                                         

 

             K (test code = 4.5 mmol/L   3.5-5.0                   



             5347034808)                                         

 

             CL (test code = 107 mmol/L                       



             9247459240)                                         

 

             CO2 TOTAL (test code = 24 mmol/L    23-31                     



             1753188634)                                         

 

             AGAP (test code =              2-16                      



             2392108124)                                         

 

             BUN (test code = 16 mg/dL     7-23                      



             6218927028)                                         

 

             GLUCOSE (test code = 116 mg/dL           H            



             6681026610)                                         

 

             CREATININE (test code = 0.62 mg/dL   0.60-1.25                 



             4503673772)                                         

 

             TOTAL BILI (test code = 0.4 mg/dL    0.1-1.1                   



             8367739612)                                         

 

             CALCIUM (test code = 8.7 mg/dL    8.6-10.6                  



             3641554119)                                         

 

             T PROTEIN (test code = 7.5 g/dL     6.3-8.2                   



             4066073265)                                         

 

             ALBUMIN (test code = 3.9 g/dL     3.5-5.0                   



             5311931997)                                         

 

             ALK PHOS (test code = 93 U/L                           



             9637067000)                                         

 

             ALTv (test code = 40 U/L       5-50                      



             1742-6)                                             

 

             AST(SGOT) (test code = 51 U/L       13-40        H            



             6448074254)                                         

 

             eGFR (test code =              mL/min/1.73m2              



             8397306769)                                         

 

             MAL (test code = MAL) Association of                           



                          Glomerular Filtration                           



                          Rate (GFR) and Staging                           



                          of Kidney Disease*                           



                          +---------------------                           



                          --+-------------------                           



                          --+-------------------                           



                          ------+| GFR                           



                          (mL/min/1.73 m2) ?|                           



                          With Kidney Damage ?|                           



                          ?Without Kidney                           



                          Damage+---------------                           



                          --------+-------------                           



                          --------+-------------                           



                          ------------+| ?>90 ?                           



                          ? ? ? ? ? ? ? ?|                           



                          ?Stage one ? ? ? ? ?|                           



                          ? Normal ? ? ? ? ? ? ?                           



                          ?+--------------------                           



                          ---+------------------                           



                          ---+------------------                           



                          -------+| ?60-89 ? ? ?                           



                          ? ? ? ? ?| ?Stage two                           



                          ? ? ? ? ?| ? Decreased                           



                          GFR ? ? ? ?                            



                          +---------------------                           



                          --+-------------------                           



                          --+-------------------                           



                          ------+| ?30-59 ? ? ?                           



                          ? ? ? ? ?| ?Stage                           



                          three ? ? ? ?| ? Stage                           



                          three ? ? ? ? ?                           



                          +---------------------                           



                          --+-------------------                           



                          --+-------------------                           



                          ------+| ?15-29 ? ? ?                           



                          ? ? ? ? ?| ?Stage four                           



                          ? ? ? ? | ? Stage four                           



                          ? ? ? ? ?                              



                          ?+--------------------                           



                          ---+------------------                           



                          ---+------------------                           



                          -------+| ?<15 (or                           



                          dialysis) ? ?| ?Stage                           



                          five ? ? ? ? | ? Stage                           



                          five ? ? ? ? ?                           



                          ?+--------------------                           



                          ---+------------------                           



                          ---+------------------                           



                          -------+ *Each stage                           



                          assumes the associated                           



                          GFR level has been in                           



                          effect for at least                           



                          three months. ?Stages                           



                          1 to 5, with or                           



                          without kidney                           



                          disease, indicate                           



                          chronic kidney                           



                          disease. Notes:                           



                          Determination of                           



                          stages one and two                           



                          (with eGFR                             



                          >59mL/min/1.73 m2)                           



                          requires estimation of                           



                          kidney damage for at                           



                          least three months as                           



                          defined by structural                           



                          or functional                           



                          abnormalities of the                           



                          kidney, manifested by                           



                          either:Pathological                           



                          abnormalities or                           



                          Markers of kidney                           



                          damage (including                           



                          abnormalities in the                           



                          composition of the                           



                          blood or urine or                           



                          abnormalities in                           



                          imaging tests).                           

 

             Lab Interpretation Abnormal                               



             (test code = 84699-9)                                        



[facility] WITH [id number] 12:21:36





             Test Item    Value        Reference Range Interpretation Comments

 

             WBC (test code =              See_Comment                [Automated



             8290-2)                                             message] The sy

stem



                                                                 which generated



                                                                 this result



                                                                 transmitted



                                                                 reference range

:



                                                                 4.20 - 10.70



                                                                 10*3/?L. The



                                                                 reference range

 was



                                                                 not used to



                                                                 interpret this



                                                                 result as



                                                                 normal/abnormal

.

 

             RBC (test code =              See_Comment                [Automated



             209-8)                                              message] The sy

stem



                                                                 which generated



                                                                 this result



                                                                 transmitted



                                                                 reference range

:



                                                                 4.26 - 5.52



                                                                 10*6/?L. The



                                                                 reference range

 was



                                                                 not used to



                                                                 interpret this



                                                                 result as



                                                                 normal/abnormal

.

 

             HGB (test code = 15.7 g/dL    12.2-16.4                 



             718-7)                                              

 

             HCT (test code = 48.0 %       38.4-49.3                 



             4544-3)                                             

 

             MCV (test code = 90.1 fL      81.7-95.6                 



             787-2)                                              

 

             MCH (test code = 29.5 pg      26.1-32.7                 



             785-6)                                              

 

             MCHC (test code = 32.7 g/dL    31.2-35.0                 



             786-4)                                              

 

             RDW-SD (test code = 50.6 fL      38.5-51.6                 



             27290-2)                                            

 

             RDW-CV (test code = 15.3 %       12.1-15.4                 



             788-0)                                              

 

             PLT (test code =              See_Comment  H             [Automated



             807-3)                                              message] The sy

stem



                                                                 which generated



                                                                 this result



                                                                 transmitted



                                                                 reference range

:



                                                                 150 - 328 10*3/

?L.



                                                                 The reference r

zhen



                                                                 was not used to



                                                                 interpret this



                                                                 result as



                                                                 normal/abnormal

.

 

             MPV (test code = 10.3 fL      9.8-13.0                  



             18427-5)                                            

 

             NRBC/100 WBC (test              See_Comment                [Automat

ed



             code = 3990417552)                                        message] 

The system



                                                                 which generated



                                                                 this result



                                                                 transmitted



                                                                 reference range

:



                                                                 0.0 - 10.0 /100



                                                                 WBCs. The refer

ence



                                                                 range was not u

sed



                                                                 to interpret th

is



                                                                 result as



                                                                 normal/abnormal

.

 

             NRBC x10^3 (test code <0.01        See_Comment                [Auto

mated



             = 0970464384)                                        message] The s

ystem



                                                                 which generated



                                                                 this result



                                                                 transmitted



                                                                 reference range

:



                                                                 10*3/?L. The



                                                                 reference range

 was



                                                                 not used to



                                                                 interpret this



                                                                 result as



                                                                 normal/abnormal

.

 

             GRAN MAT (NEUT) % 61.5 %                                 



             (test code = 770-8)                                        

 

             IMM GRAN % (test code 0.80 %                                 



             = 3914115625)                                        

 

             LYMPH % (test code = 27.8 %                                 



             736-9)                                              

 

             MONO % (test code = 6.9 %                                  



             5905-5)                                             

 

             EOS % (test code = 2.4 %                                  



             713-8)                                              

 

             BASO % (test code = 0.6 %                                  



             706-2)                                              

 

             GRAN MAT x10^3(ANC) 6.07 10*3/uL 1.99-6.95                 



             (test code =                                        



             1474842893)                                         

 

             IMM GRAN x10^3 (test 0.08 10*3/uL 0.00-0.06    H            



             code = 6367159305)                                        

 

             LYMPH x10^3 (test code 2.75 10*3/uL 1.09-3.23                 



             = 731-0)                                            

 

             MONO x10^3 (test code 0.68 10*3/uL 0.36-1.02                 



             = 742-7)                                            

 

             EOS x10^3 (test code = 0.24 10*3/uL 0.06-0.53                 



             711-2)                                              

 

             BASO x10^3 (test code 0.06 10*3/uL 0.01-0.09                 



             = 704-7)                                            

 

             Lab Interpretation Abnormal                               



             (test code = 98459-2)                                        



[facility] Metabolic Panel (NA, K, CL, CO2, 
GLUCOSE, BUN, CREATININE, CA)2022 12:40:30





             Test Item    Value        Reference Range Interpretation Comments

 

             NA (test code = 139 mmol/L   135-145                   



             4311927558)                                         

 

             K (test code = 3.9 mmol/L   3.5-5.0                   



             0037689231)                                         

 

             CL (test code = 108 mmol/L                       



             2748979406)                                         

 

             CO2 TOTAL (test code = 25 mmol/L    23-31                     



             2459877736)                                         

 

             AGAP (test code =              2-16                      



             8721958688)                                         

 

             BUN (test code = 14 mg/dL     7-23                      



             5920683644)                                         

 

             GLUCOSE (test code = 107 mg/dL                        



             9938339424)                                         

 

             CREATININE (test code = 0.61 mg/dL   0.60-1.25                 



             6436418735)                                         

 

             CALCIUM (test code = 8.1 mg/dL    8.6-10.6     L            



             1880178933)                                         

 

             eGFR (test code =              mL/min/1.73m2              



             7077977045)                                         

 

             MAL (test code = MAL) Association of                           



                          Glomerular Filtration                           



                          Rate (GFR) and Staging                           



                          of Kidney Disease*                           



                          +---------------------                           



                          --+-------------------                           



                          --+-------------------                           



                          ------+| GFR                           



                          (mL/min/1.73 m2) ?|                           



                          With Kidney Damage ?|                           



                          ?Without Kidney                           



                          Damage+---------------                           



                          --------+-------------                           



                          --------+-------------                           



                          ------------+| ?>90 ?                           



                          ? ? ? ? ? ? ? ?|                           



                          ?Stage one ? ? ? ? ?|                           



                          ? Normal ? ? ? ? ? ? ?                           



                          ?+--------------------                           



                          ---+------------------                           



                          ---+------------------                           



                          -------+| ?60-89 ? ? ?                           



                          ? ? ? ? ?| ?Stage two                           



                          ? ? ? ? ?| ? Decreased                           



                          GFR ? ? ? ?                            



                          +---------------------                           



                          --+-------------------                           



                          --+-------------------                           



                          ------+| ?30-59 ? ? ?                           



                          ? ? ? ? ?| ?Stage                           



                          three ? ? ? ?| ? Stage                           



                          three ? ? ? ? ?                           



                          +---------------------                           



                          --+-------------------                           



                          --+-------------------                           



                          ------+| ?15-29 ? ? ?                           



                          ? ? ? ? ?| ?Stage four                           



                          ? ? ? ? | ? Stage four                           



                          ? ? ? ? ?                              



                          ?+--------------------                           



                          ---+------------------                           



                          ---+------------------                           



                          -------+| ?<15 (or                           



                          dialysis) ? ?| ?Stage                           



                          five ? ? ? ? | ? Stage                           



                          five ? ? ? ? ?                           



                          ?+--------------------                           



                          ---+------------------                           



                          ---+------------------                           



                          -------+ *Each stage                           



                          assumes the associated                           



                          GFR level has been in                           



                          effect for at least                           



                          three months. ?Stages                           



                          1 to 5, with or                           



                          without kidney                           



                          disease, indicate                           



                          chronic kidney                           



                          disease. Notes:                           



                          Determination of                           



                          stages one and two                           



                          (with eGFR                             



                          >59mL/min/1.73 m2)                           



                          requires estimation of                           



                          kidney damage for at                           



                          least three months as                           



                          defined by structural                           



                          or functional                           



                          abnormalities of the                           



                          kidney, manifested by                           



                          either:Pathological                           



                          abnormalities or                           



                          Markers of kidney                           



                          damage (including                           



                          abnormalities in the                           



                          composition of the                           



                          blood or urine or                           



                          abnormalities in                           



                          imaging tests).                           

 

             Lab Interpretation Abnormal                               



             (test code = 74498-1)                                        



[facility] with [id number] 12:23:51





             Test Item    Value        Reference Range Interpretation Comments

 

             WBC (test code =              See_Comment                [Automated



             6690-2)                                             message] The sy

stem



                                                                 which generated



                                                                 this result



                                                                 transmitted



                                                                 reference range

:



                                                                 4.20 - 10.70



                                                                 10*3/?L. The



                                                                 reference range

 was



                                                                 not used to



                                                                 interpret this



                                                                 result as



                                                                 normal/abnormal

.

 

             RBC (test code =              See_Comment                [Automated



             789-8)                                              message] The sy

stem



                                                                 which generated



                                                                 this result



                                                                 transmitted



                                                                 reference range

:



                                                                 4.26 - 5.52



                                                                 10*6/?L. The



                                                                 reference range

 was



                                                                 not used to



                                                                 interpret this



                                                                 result as



                                                                 normal/abnormal

.

 

             HGB (test code = 14.9 g/dL    12.2-16.4                 



             718-7)                                              

 

             HCT (test code = 44.2 %       38.4-49.3                 



             4544-3)                                             

 

             MCV (test code = 88.4 fL      81.7-95.6                 



             787-2)                                              

 

             MCH (test code = 29.8 pg      26.1-32.7                 



             785-6)                                              

 

             MCHC (test code = 33.7 g/dL    31.2-35.0                 



             786-4)                                              

 

             RDW-SD (test code = 49.3 fL      38.5-51.6                 



             19187-5)                                            

 

             RDW-CV (test code = 15.3 %       12.1-15.4                 



             788-0)                                              

 

             PLT (test code =              See_Comment  H             [Automated



             777-3)                                              message] The sy

stem



                                                                 which generated



                                                                 this result



                                                                 transmitted



                                                                 reference range

:



                                                                 150 - 328 10*3/

?L.



                                                                 The reference r

zhen



                                                                 was not used to



                                                                 interpret this



                                                                 result as



                                                                 normal/abnormal

.

 

             MPV (test code = 10.2 fL      9.8-13.0                  



             92478-1)                                            

 

             NRBC/100 WBC (test              See_Comment                [Automat

ed



             code = 1873632169)                                        message] 

The system



                                                                 which generated



                                                                 this result



                                                                 transmitted



                                                                 reference range

:



                                                                 0.0 - 10.0 /100



                                                                 WBCs. The refer

ence



                                                                 range was not u

sed



                                                                 to interpret th

is



                                                                 result as



                                                                 normal/abnormal

.

 

             NRBC x10^3 (test code <0.01        See_Comment                [Auto

mated



             = [id number])                                        message] The s

ystem



                                                                 which generated



                                                                 this result



                                                                 transmitted



                                                                 reference range

:



                                                                 10*3/?L. The



                                                                 reference range

 was



                                                                 not used to



                                                                 interpret this



                                                                 result as



                                                                 normal/abnormal

.

 

             GRAN MAT (NEUT) % 56.7 %                                 



             (test code = 770-8)                                        

 

             IMM GRAN % (test code 0.60 %                                 



             = 2103999089)                                        

 

             LYMPH % (test code = 31.1 %                                 



             736-9)                                              

 

             MONO % (test code = 8.7 %                                  



             5905-5)                                             

 

             EOS % (test code = 2.4 %                                  



             713-8)                                              

 

             BASO % (test code = 0.5 %                                  



             706-2)                                              

 

             GRAN MAT x10^3(ANC) 4.83 10*3/uL 1.99-6.95                 



             (test code =                                        



             2352168668)                                         

 

             IMM GRAN x10^3 (test 0.05 10*3/uL 0.00-0.06                 



             code = 5128533929)                                        

 

             LYMPH x10^3 (test code 2.64 10*3/uL 1.09-3.23                 



             = 731-0)                                            

 

             MONO x10^3 (test code 0.74 10*3/uL 0.36-1.02                 



             = 742-7)                                            

 

             EOS x10^3 (test code = 0.20 10*3/uL 0.06-0.53                 



             711-2)                                              

 

             BASO x10^3 (test code 0.04 10*3/uL 0.01-0.09                 



             = 704-7)                                            

 

             Lab Interpretation Abnormal                               



             (test code = 98301-9)                                        



[id number] 
[facility]:14





             Test Item    Value        Reference Range Interpretation Comments

 

             NA (test code = 139 mmol/L   135-145                   



             4840350659)                                         

 

             K (test code = 4.5 mmol/L   3.5-5.0                   



             0982206349)                                         

 

             CL (test code = 102 mmol/L                       



             5689399276)                                         

 

             CO2 TOTAL (test code = 26 mmol/L    23-31                     



             9809212778)                                         

 

             AGAP (test code =              2-16                      



             8553483377)                                         

 

             BUN (test code = 16 mg/dL     7-23                      



             7989270440)                                         

 

             GLUCOSE (test code = 99 mg/dL                         



             8540175053)                                         

 

             CREATININE (test code = 0.83 mg/dL   0.60-1.25                 



             6284300357)                                         

 

             TOTAL BILI (test code = 0.6 mg/dL    0.1-1.1                   



             3510389805)                                         

 

             CALCIUM (test code = 9.5 mg/dL    8.6-10.6                  



             0806095046)                                         

 

             T PROTEIN (test code = 8.6 g/dL     6.3-8.2      H            



             6320438939)                                         

 

             ALBUMIN (test code = 4.6 g/dL     3.5-5.0                   



             5433168369)                                         

 

             ALK PHOS (test code = 121 U/L                          



             3329571102)                                         

 

             ALTv (test code = 58 U/L       5-50         H            



             2-6)                                             

 

             AST(SGOT) (test code = 32 U/L       13-40                     



             1713209172)                                         

 

             eGFR (test code =              mL/min/1.73m2              



             4164518469)                                         

 

             MAL (test code = MAL) Association of                           



                          Glomerular Filtration                           



                          Rate (GFR) and Staging                           



                          of Kidney Disease*                           



                          +---------------------                           



                          --+-------------------                           



                          --+-------------------                           



                          ------+| GFR                           



                          (mL/min/1.73 m2) ?|                           



                          With Kidney Damage ?|                           



                          ?Without Kidney                           



                          Damage+---------------                           



                          --------+-------------                           



                          --------+-------------                           



                          ------------+| ?>90 ?                           



                          ? ? ? ? ? ? ? ?|                           



                          ?Stage one ? ? ? ? ?|                           



                          ? Normal ? ? ? ? ? ? ?                           



                          ?+--------------------                           



                          ---+------------------                           



                          ---+------------------                           



                          -------+| ?60-89 ? ? ?                           



                          ? ? ? ? ?| ?Stage two                           



                          ? ? ? ? ?| ? Decreased                           



                          GFR ? ? ? ?                            



                          +---------------------                           



                          --+-------------------                           



                          --+-------------------                           



                          ------+| ?30-59 ? ? ?                           



                          ? ? ? ? ?| ?Stage                           



                          three ? ? ? ?| ? Stage                           



                          three ? ? ? ? ?                           



                          +---------------------                           



                          --+-------------------                           



                          --+-------------------                           



                          ------+| ?15-29 ? ? ?                           



                          ? ? ? ? ?| ?Stage four                           



                          ? ? ? ? | ? Stage four                           



                          ? ? ? ? ?                              



                          ?+--------------------                           



                          ---+------------------                           



                          ---+------------------                           



                          -------+| ?<15 (or                           



                          dialysis) ? ?| ?Stage                           



                          five ? ? ? ? | ? Stage                           



                          five ? ? ? ? ?                           



                          ?+--------------------                           



                          ---+------------------                           



                          ---+------------------                           



                          -------+ *Each stage                           



                          assumes the associated                           



                          GFR level has been in                           



                          effect for at least                           



                          three months. ?Stages                           



                          1 to 5, with or                           



                          without kidney                           



                          disease, indicate                           



                          chronic kidney                           



                          disease. Notes:                           



                          Determination of                           



                          stages one and two                           



                          (with eGFR                             



                          >59mL/min/1.73 m2)                           



                          requires estimation of                           



                          kidney damage for at                           



                          least three months as                           



                          defined by structural                           



                          or functional                           



                          abnormalities of the                           



                          kidney, manifested by                           



                          either:Pathological                           



                          abnormalities or                           



                          Markers of kidney                           



                          damage (including                           



                          abnormalities in the                           



                          composition of the                           



                          blood or urine or                           



                          abnormalities in                           



                          imaging tests).                           

 

             Lab Interpretation Abnormal                               



             (test code = 75891-5)                                        



[facility] WITH [id number] 05:48:31





             Test Item    Value        Reference Range Interpretation Comments

 

             WBC (test code =              See_Comment  H             [Automated



             6690-2)                                             message] The sy

stem



                                                                 which generated



                                                                 this result



                                                                 transmitted



                                                                 reference range

:



                                                                 4.20 - 10.70



                                                                 10*3/?L. The



                                                                 reference range

 was



                                                                 not used to



                                                                 interpret this



                                                                 result as



                                                                 normal/abnormal

.

 

             RBC (test code =              See_Comment  H             [Automated



             789-8)                                              message] The sy

stem



                                                                 which generated



                                                                 this result



                                                                 transmitted



                                                                 reference range

:



                                                                 4.26 - 5.52



                                                                 10*6/?L. The



                                                                 reference range

 was



                                                                 not used to



                                                                 interpret this



                                                                 result as



                                                                 normal/abnormal

.

 

             HGB (test code = 17.3 g/dL    12.2-16.4    H            



             718-7)                                              

 

             HCT (test code = 51.4 %       38.4-49.3    H            



             4544-3)                                             

 

             MCV (test code = 87.7 fL      81.7-95.6                 



             787-2)                                              

 

             MCH (test code = 29.5 pg      26.1-32.7                 



             785-6)                                              

 

             MCHC (test code = 33.7 g/dL    31.2-35.0                 



             786-4)                                              

 

             RDW-SD (test code = 49.1 fL      38.5-51.6                 



             12595-3)                                            

 

             RDW-CV (test code = 15.5 %       12.1-15.4    H            



             788-0)                                              

 

             PLT (test code =              See_Comment  H             [Automated



             777-3)                                              message] The sy

stem



                                                                 which generated



                                                                 this result



                                                                 transmitted



                                                                 reference range

:



                                                                 150 - 328 10*3/

?L.



                                                                 The reference r

zhen



                                                                 was not used to



                                                                 interpret this



                                                                 result as



                                                                 normal/abnormal

.

 

             MPV (test code = 10.4 fL      9.8-13.0                  



             78554-9)                                            

 

             NRBC/100 WBC (test              See_Comment                [Automat

ed



             code = 0607557346)                                        message] 

The system



                                                                 which generated



                                                                 this result



                                                                 transmitted



                                                                 reference range

:



                                                                 0.0 - 10.0 /100



                                                                 WBCs. The refer

ence



                                                                 range was not u

sed



                                                                 to interpret th

is



                                                                 result as



                                                                 normal/abnormal

.

 

             NRBC x10^3 (test code <0.01        See_Comment                [Auto

mated



             = 5397155359)                                        message] The s

ystem



                                                                 which generated



                                                                 this result



                                                                 transmitted



                                                                 reference range

:



                                                                 10*3/?L. The



                                                                 reference range

 was



                                                                 not used to



                                                                 interpret this



                                                                 result as



                                                                 normal/abnormal

.

 

             GRAN MAT (NEUT) % 64.8 %                                 



             (test code = 770-8)                                        

 

             IMM GRAN % (test code 0.70 %                                 



             = 8297915787)                                        

 

             LYMPH % (test code = 25.2 %                                 



             736-9)                                              

 

             MONO % (test code = 6.9 %                                  



             5905-5)                                             

 

             EOS % (test code = 1.9 %                                  



             713-8)                                              

 

             BASO % (test code = 0.5 %                                  



             706-2)                                              

 

             GRAN MAT x10^3(ANC) 7.80 10*3/uL 1.99-6.95    H            



             (test code =                                        



             5518740612)                                         

 

             IMM GRAN x10^3 (test 0.08 10*3/uL 0.00-0.06    H            



             code = 8017827121)                                        

 

             LYMPH x10^3 (test code 3.04 10*3/uL 1.09-3.23                 



             = 731-0)                                            

 

             MONO x10^3 (test code 0.83 10*3/uL 0.36-1.02                 



             = 742-7)                                            

 

             EOS x10^3 (test code = 0.23 10*3/uL 0.06-0.53                 



             711-2)                                              

 

             BASO x10^3 (test code 0.06 10*3/uL 0.01-0.09                 



             = 704-7)                                            

 

             Lab Interpretation Abnormal                               



             (test code = 92115-5)                                        



[facility]. METABOLIC PANEL ([id number])2022 
02:19:08





             Test Item    Value        Reference Range Interpretation Comments

 

             NA (test code = 136 mmol/L   135-145                   



             8321883228)                                         

 

             K (test code = 4.4 mmol/L   3.5-5.0                   



             1972117213)                                         

 

             CL (test code = 99 mmol/L                        



             8612306678)                                         

 

             CO2 TOTAL (test code = 25 mmol/L    23-31                     



             1908565339)                                         

 

             AGAP (test code =              2-16                      



             6072268788)                                         

 

             BUN (test code = 19 mg/dL     7-23                      



             4095918638)                                         

 

             GLUCOSE (test code = 103 mg/dL                        



             3524841132)                                         

 

             CREATININE (test code = 0.70 mg/dL   0.60-1.25                 



             5342107497)                                         

 

             TOTAL BILI (test code = 0.7 mg/dL    0.1-1.1                   



             4114795670)                                         

 

             CALCIUM (test code = 9.2 mg/dL    8.6-10.6                  



             6494981831)                                         

 

             T PROTEIN (test code = 9.4 g/dL     6.3-8.2      H            



             7187016643)                                         

 

             ALBUMIN (test code = 4.8 g/dL     3.5-5.0                   



             8457183683)                                         

 

             ALK PHOS (test code = 146 U/L             H            



             6214120167)                                         

 

             ALTv (test code = 75 U/L       5-50         H            



             1742-6)                                             

 

             AST(SGOT) (test code = 37 U/L       13-40                     



             7779353897)                                         

 

             eGFR (test code =              mL/min/1.73m2              



             8659243691)                                         

 

             MAL (test code = MAL) Association of                           



                          Glomerular Filtration                           



                          Rate (GFR) and Staging                           



                          of Kidney Disease*                           



                          +---------------------                           



                          --+-------------------                           



                          --+-------------------                           



                          ------+| GFR                           



                          (mL/min/1.73 m2) ?|                           



                          With Kidney Damage ?|                           



                          ?Without Kidney                           



                          Damage+---------------                           



                          --------+-------------                           



                          --------+-------------                           



                          ------------+| ?>90 ?                           



                          ? ? ? ? ? ? ? ?|                           



                          ?Stage one ? ? ? ? ?|                           



                          ? Normal ? ? ? ? ? ? ?                           



                          ?+--------------------                           



                          ---+------------------                           



                          ---+------------------                           



                          -------+| ?60-89 ? ? ?                           



                          ? ? ? ? ?| ?Stage two                           



                          ? ? ? ? ?| ? Decreased                           



                          GFR ? ? ? ?                            



                          +---------------------                           



                          --+-------------------                           



                          --+-------------------                           



                          ------+| ?30-59 ? ? ?                           



                          ? ? ? ? ?| ?Stage                           



                          three ? ? ? ?| ? Stage                           



                          three ? ? ? ? ?                           



                          +---------------------                           



                          --+-------------------                           



                          --+-------------------                           



                          ------+| ?15-29 ? ? ?                           



                          ? ? ? ? ?| ?Stage four                           



                          ? ? ? ? | ? Stage four                           



                          ? ? ? ? ?                              



                          ?+--------------------                           



                          ---+------------------                           



                          ---+------------------                           



                          -------+| ?<15 (or                           



                          dialysis) ? ?| ?Stage                           



                          five ? ? ? ? | ? Stage                           



                          five ? ? ? ? ?                           



                          ?+--------------------                           



                          ---+------------------                           



                          ---+------------------                           



                          -------+ *Each stage                           



                          assumes the associated                           



                          GFR level has been in                           



                          effect for at least                           



                          three months. ?Stages                           



                          1 to 5, with or                           



                          without kidney                           



                          disease, indicate                           



                          chronic kidney                           



                          disease. Notes:                           



                          Determination of                           



                          stages one and two                           



                          (with eGFR                             



                          >59mL/min/1.73 m2)                           



                          requires estimation of                           



                          kidney damage for at                           



                          least three months as                           



                          defined by structural                           



                          or functional                           



                          abnormalities of the                           



                          kidney, manifested by                           



                          either:Pathological                           



                          abnormalities or                           



                          Markers of kidney                           



                          damage (including                           



                          abnormalities in the                           



                          composition of the                           



                          blood or urine or                           



                          abnormalities in                           



                          imaging tests).                           

 

             Lab Interpretation Abnormal                               



             (test code = 57296-6)                                        



[facility]





             Test Item    Value        Reference Range Interpretation Comments

 

             LIPASE (test code = 4480267579) 86 U/L       0-220                 

    

 

             Lab Interpretation (test code = Normal                             

    



             41039-1)                                            



[facility] WITH [id number] 01:55:49





             Test Item    Value        Reference Range Interpretation Comments

 

             WBC (test code =              See_Comment  H             [Automated



             6690-2)                                             message] The sy

stem



                                                                 which generated



                                                                 this result



                                                                 transmitted



                                                                 reference range

:



                                                                 4.20 - 10.70



                                                                 10*3/?L. The



                                                                 reference range

 was



                                                                 not used to



                                                                 interpret this



                                                                 result as



                                                                 normal/abnormal

.

 

             RBC (test code =              See_Comment  H             [Automated



             789-8)                                              message] The sy

stem



                                                                 which generated



                                                                 this result



                                                                 transmitted



                                                                 reference range

:



                                                                 4.26 - 5.52



                                                                 10*6/?L. The



                                                                 reference range

 was



                                                                 not used to



                                                                 interpret this



                                                                 result as



                                                                 normal/abnormal

.

 

             HGB (test code = 18.0 g/dL    12.2-16.4    H            



             718-7)                                              

 

             HCT (test code = 53.9 %       38.4-49.3    H            



             4544-3)                                             

 

             MCV (test code = 87.2 fL      81.7-95.6                 



             787-2)                                              

 

             MCH (test code = 29.1 pg      26.1-32.7                 



             785-6)                                              

 

             MCHC (test code = 33.4 g/dL    31.2-35.0                 



             786-4)                                              

 

             RDW-SD (test code = 48.7 fL      38.5-51.6                 



             00918-6)                                            

 

             RDW-CV (test code = 15.4 %       12.1-15.4                 



             788-0)                                              

 

             PLT (test code =              See_Comment  H             [Automated



             777-3)                                              message] The sy

stem



                                                                 which generated



                                                                 this result



                                                                 transmitted



                                                                 reference range

:



                                                                 150 - 328 10*3/

?L.



                                                                 The reference r

zhen



                                                                 was not used to



                                                                 interpret this



                                                                 result as



                                                                 normal/abnormal

.

 

             MPV (test code = 9.9 fL       9.8-13.0                  



             07291-1)                                            

 

             NRBC/100 WBC (test              See_Comment                [Automat

ed



             code = 6660931297)                                        message] 

The system



                                                                 which generated



                                                                 this result



                                                                 transmitted



                                                                 reference range

:



                                                                 0.0 - 10.0 /100



                                                                 WBCs. The refer

ence



                                                                 range was not u

sed



                                                                 to interpret th

is



                                                                 result as



                                                                 normal/abnormal

.

 

             NRBC x10^3 (test code <0.01        See_Comment                [Auto

mated



             = 6806143931)                                        message] The s

ystem



                                                                 which generated



                                                                 this result



                                                                 transmitted



                                                                 reference range

:



                                                                 10*3/?L. The



                                                                 reference range

 was



                                                                 not used to



                                                                 interpret this



                                                                 result as



                                                                 normal/abnormal

.

 

             GRAN MAT (NEUT) % 69.8 %                                 



             (test code = 770-8)                                        

 

             IMM GRAN % (test code 0.50 %                                 



             = 2256373342)                                        

 

             LYMPH % (test code = 20.5 %                                 



             736-9)                                              

 

             MONO % (test code = 6.8 %                                  



             5905-5)                                             

 

             EOS % (test code = 1.9 %                                  



             713-8)                                              

 

             BASO % (test code = 0.5 %                                  



             706-2)                                              

 

             GRAN MAT x10^3(ANC) 7.72 10*3/uL 1.99-6.95    H            



             (test code =                                        



             2471262703)                                         

 

             IMM GRAN x10^3 (test 0.05 10*3/uL 0.00-0.06                 



             code = 3770239714)                                        

 

             LYMPH x10^3 (test code 2.26 10*3/uL 1.09-3.23                 



             = 731-0)                                            

 

             MONO x10^3 (test code 0.75 10*3/uL 0.36-1.02                 



             = 742-7)                                            

 

             EOS x10^3 (test code = 0.21 10*3/uL 0.06-0.53                 



             711-2)                                              

 

             BASO x10^3 (test code 0.06 10*3/uL 0.01-0.09                 



             = 704-7)                                            

 

             Lab Interpretation Abnormal                               



             (test code = 30374-6)                                        



[id number]52





             Test Item    Value        Reference    Interpretation Comments



                                       Range                     

 

             D-DIMER (test code =              See_Comment                [Autom

ated



             1504330329)                                         message] The



                                                                 system which



                                                                 generated this



                                                                 result



                                                                 transmitted



                                                                 reference range

:



                                                                 <0.41 ?g/mL



                                                                 (FEU). The



                                                                 reference range



                                                                 was not used to



                                                                 interpret this



                                                                 result as



                                                                 normal/abnormal

.

 

             MAL (test code = This test may be                           



             MAL)         used in conjunction                           



                          with a clinical                           



                          pretest probability                           



                          (PTP) assessment                           



                          model to exclude                           



                          venous                                 



                          thromboembolism                           



                          (VTE) in patients                           



                          suspected of deep                           



                          venous thrombosis                           



                          (DVT) and pulmonary                           



                          embolism (PE) A                           



                          D-Dimer value less                           



                          than 0.50 ?g/ml                           



                          (FEU) has a negative                           



                          predicative value of                           



                          96 to 100% (95%                           



                          CI)and 97 to 100%                           



                          (95% CI) as an aid                           



                          in the diagnosis of                           



                          deep vein thrombosis                           



                          (DVT) and pulmonary                           



                          embolism when there                           



                          is low or moderate                           



                          pretest probability                           



                          of PE or DVT.                           



                          D-Dimer values are                           



                          expressed in initial                           



                          fibrinogen                             



                          equivalent units                           



                          (FEU)" The assay                           



                          results should be                           



                          used with other                           



                          information,                           



                          including the                           



                          clinical context, in                           



                          forming a diagnosis.                           

 

             Lab Interpretation Normal                                 



             (test code =                                        



             70143-6)                                            



[facility]. METABOLIC PANEL ([id number])2022-01-15 
22:42:48





             Test Item    Value        Reference Range Interpretation Comments

 

             NA (test code = 135 mmol/L   135-145                   



             8607312713)                                         

 

             K (test code = 4.6 mmol/L   3.5-5.0                   



             0909286925)                                         

 

             CL (test code = 102 mmol/L                       



             7194683405)                                         

 

             CO2 TOTAL (test code = 24 mmol/L    23-31                     



             0790463348)                                         

 

             AGAP (test code =              2-16                      



             0495361761)                                         

 

             BUN (test code = 17 mg/dL     7-23                      



             3941163427)                                         

 

             GLUCOSE (test code = 107 mg/dL                        



             4850042694)                                         

 

             CREATININE (test code = 0.60 mg/dL   0.60-1.25                 



             3616956915)                                         

 

             TOTAL BILI (test code = 1.0 mg/dL    0.1-1.1                   



             5529682329)                                         

 

             CALCIUM (test code = 9.4 mg/dL    8.6-10.6                  



             3127945602)                                         

 

             T PROTEIN (test code = 8.6 g/dL     6.3-8.2      H            



             8817626923)                                         

 

             ALBUMIN (test code = 4.6 g/dL     3.5-5.0                   



             5974534832)                                         

 

             ALK PHOS (test code = 159 U/L             H            



             5330592476)                                         

 

             ALTv (test code = 77 U/L       5-50         H            



             1742-6)                                             

 

             AST(SGOT) (test code = 38 U/L       13-40                     



             3829604242)                                         

 

             eGFR (test code =              mL/min/1.73m2              



             7092082193)                                         

 

             MAL (test code = MAL) Association of                           



                          Glomerular Filtration                           



                          Rate (GFR) and Staging                           



                          of Kidney Disease*                           



                          +---------------------                           



                          --+-------------------                           



                          --+-------------------                           



                          ------+| GFR                           



                          (mL/min/1.73 m2) ?|                           



                          With Kidney Damage ?|                           



                          ?Without Kidney                           



                          Damage+---------------                           



                          --------+-------------                           



                          --------+-------------                           



                          ------------+| ?>90 ?                           



                          ? ? ? ? ? ? ? ?|                           



                          ?Stage one ? ? ? ? ?|                           



                          ? Normal ? ? ? ? ? ? ?                           



                          ?+--------------------                           



                          ---+------------------                           



                          ---+------------------                           



                          -------+| ?60-89 ? ? ?                           



                          ? ? ? ? ?| ?Stage two                           



                          ? ? ? ? ?| ? Decreased                           



                          GFR ? ? ? ?                            



                          +---------------------                           



                          --+-------------------                           



                          --+-------------------                           



                          ------+| ?30-59 ? ? ?                           



                          ? ? ? ? ?| ?Stage                           



                          three ? ? ? ?| ? Stage                           



                          three ? ? ? ? ?                           



                          +---------------------                           



                          --+-------------------                           



                          --+-------------------                           



                          ------+| ?15-29 ? ? ?                           



                          ? ? ? ? ?| ?Stage four                           



                          ? ? ? ? | ? Stage four                           



                          ? ? ? ? ?                              



                          ?+--------------------                           



                          ---+------------------                           



                          ---+------------------                           



                          -------+| ?<15 (or                           



                          dialysis) ? ?| ?Stage                           



                          five ? ? ? ? | ? Stage                           



                          five ? ? ? ? ?                           



                          ?+--------------------                           



                          ---+------------------                           



                          ---+------------------                           



                          -------+ *Each stage                           



                          assumes the associated                           



                          GFR level has been in                           



                          effect for at least                           



                          three months. ?Stages                           



                          1 to 5, with or                           



                          without kidney                           



                          disease, indicate                           



                          chronic kidney                           



                          disease. Notes:                           



                          Determination of                           



                          stages one and two                           



                          (with eGFR                             



                          >59mL/min/1.73 m2)                           



                          requires estimation of                           



                          kidney damage for at                           



                          least three months as                           



                          defined by structural                           



                          or functional                           



                          abnormalities of the                           



                          kidney, manifested by                           



                          either:Pathological                           



                          abnormalities or                           



                          Markers of kidney                           



                          damage (including                           



                          abnormalities in the                           



                          composition of the                           



                          blood or urine or                           



                          abnormalities in                           



                          imaging tests).                           

 

             Lab Interpretation Abnormal                               



             (test code = 44060-0)                                        



[facility] WITH DIFF2022-01-15 22:30:27





             Test Item    Value        Reference Range Interpretation Comments

 

             WBC (test code =              See_Comment  H             [Automated



             6690-2)                                             message] The



                                                                 system which



                                                                 generated this



                                                                 result transmit

huma



                                                                 reference range

:



                                                                 4.20 - 10.70



                                                                 10*3/?L. The



                                                                 reference range



                                                                 was not used to



                                                                 interpret this



                                                                 result as



                                                                 normal/abnormal

.

 

             RBC (test code =              See_Comment  H             [Automated



             239-8)                                              message] The



                                                                 system which



                                                                 generated this



                                                                 result transmit

huma



                                                                 reference range

:



                                                                 4.26 - 5.52



                                                                 10*6/?L. The



                                                                 reference range



                                                                 was not used to



                                                                 interpret this



                                                                 result as



                                                                 normal/abnormal

.

 

             HGB (test code = 17.5 g/dL    12.2-16.4    H            



             718-7)                                              

 

             HCT (test code = 51.0 %       38.4-49.3    H            



             4544-3)                                             

 

             MCV (test code = 86.7 fL      81.7-95.6                 



             787-2)                                              

 

             MCH (test code = 29.8 pg      26.1-32.7                 



             785-6)                                              

 

             MCHC (test code = 34.3 g/dL    31.2-35.0                 



             786-4)                                              

 

             RDW-SD (test code = 48.0 fL      38.5-51.6                 



             14615-2)                                            

 

             RDW-CV (test code = 15.1 %       12.1-15.4                 



             788-0)                                              

 

             PLT (test code =              See_Comment  H             [Automated



             777-3)                                              message] The



                                                                 system which



                                                                 generated this



                                                                 result transmit

huma



                                                                 reference range

:



                                                                 150 - 328 10*3/

?L.



                                                                 The reference



                                                                 range was not u

sed



                                                                 to interpret th

is



                                                                 result as



                                                                 normal/abnormal

.

 

             MPV (test code = 10.3 fL      9.8-13.0                  



             70796-5)                                            

 

             NRBC/100 WBC (test              See_Comment                [Automat

ed



             code = 3747145590)                                        message] 

The



                                                                 system which



                                                                 generated this



                                                                 result transmit

huma



                                                                 reference range

:



                                                                 0.0 - 10.0 /100



                                                                 WBCs. The



                                                                 reference range



                                                                 was not used to



                                                                 interpret this



                                                                 result as



                                                                 normal/abnormal

.

 

             NRBC x10^3 (test code <0.01        See_Comment                [Auto

mated



             = 5862259184)                                        message] The



                                                                 system which



                                                                 generated this



                                                                 result transmit

huma



                                                                 reference range

:



                                                                 10*3/?L. The



                                                                 reference range



                                                                 was not used to



                                                                 interpret this



                                                                 result as



                                                                 normal/abnormal

.

 

             GRAN MAT (NEUT) % 79.9 %                                 



             (test code = 770-8)                                        

 

             IMM GRAN % (test code 0.50 %                                 



             = 5587189285)                                        

 

             LYMPH % (test code = 11.8 %                                 



             736-9)                                              

 

             MONO % (test code = 6.6 %                                  



             5905-5)                                             

 

             EOS % (test code = 0.9 %                                  



             713-8)                                              

 

             BASO % (test code = 0.3 %                                  



             706-2)                                              

 

             GRAN MAT x10^3(ANC) 10.70 10*3/uL 1.99-6.95    H            



             (test code =                                        



             7375202958)                                         

 

             IMM GRAN x10^3 (test 0.07 10*3/uL 0.00-0.06    H            



             code = 7419210245)                                        

 

             LYMPH x10^3 (test code 1.58 10*3/uL 1.09-3.23                 



             = 731-0)                                            

 

             MONO x10^3 (test code 0.89 10*3/uL 0.36-1.02                 



             = 742-7)                                            

 

             EOS x10^3 (test code = 0.12 10*3/uL 0.06-0.53                 



             711-2)                                              

 

             BASO x10^3 (test code 0.04 10*3/uL 0.01-0.09                 



             = 704-7)                                            

 

             Lab Interpretation Abnormal                               



             (test code = 12252-2)                                        



[id number] 
[facility]:02:15





             Test Item    Value        Reference Range Interpretation Comments

 

             NA (test code = 137 mmol/L   135-145                   



             8179060816)                                         

 

             K (test code = 4.5 mmol/L   3.5-5.0                   



             0330587753)                                         

 

             CL (test code = 109 mmol/L          H            



             5483708202)                                         

 

             CO2 TOTAL (test code = 22 mmol/L    23-31        L            



             8884134836)                                         

 

             AGAP (test code =              2-16                      



             8865361540)                                         

 

             BUN (test code = 7 mg/dL      7-23                      



             9074357068)                                         

 

             GLUCOSE (test code = 87 mg/dL                         



             5859731289)                                         

 

             CREATININE (test code = 0.61 mg/dL   0.60-1.25                 



             7000723261)                                         

 

             TOTAL BILI (test code = 0.5 mg/dL    0.1-1.1                   



             7495627169)                                         

 

             CALCIUM (test code = 9.0 mg/dL    8.6-10.6                  



             0562303681)                                         

 

             T PROTEIN (test code = 6.9 g/dL     6.3-8.2                   



             5531563153)                                         

 

             ALBUMIN (test code = 3.5 g/dL     3.5-5.0                   



             4947611346)                                         

 

             ALK PHOS (test code = 112 U/L                          



             1110237792)                                         

 

             ALTv (test code = 35 U/L       5-50                      



             1742-6)                                             

 

             AST(SGOT) (test code = 21 U/L       13-40                     



             9106265205)                                         

 

             eGFR (test code =              mL/min/1.73m2              



             0141750392)                                         

 

             MAL (test code = MAL) Association of                           



                          Glomerular Filtration                           



                          Rate (GFR) and Staging                           



                          of Kidney Disease*                           



                          +---------------------                           



                          --+-------------------                           



                          --+-------------------                           



                          ------+| GFR                           



                          (mL/min/1.73 m2) ?|                           



                          With Kidney Damage ?|                           



                          ?Without Kidney                           



                          Damage+---------------                           



                          --------+-------------                           



                          --------+-------------                           



                          ------------+| ?>90 ?                           



                          ? ? ? ? ? ? ? ?|                           



                          ?Stage one ? ? ? ? ?|                           



                          ? Normal ? ? ? ? ? ? ?                           



                          ?+--------------------                           



                          ---+------------------                           



                          ---+------------------                           



                          -------+| ?60-89 ? ? ?                           



                          ? ? ? ? ?| ?Stage two                           



                          ? ? ? ? ?| ? Decreased                           



                          GFR ? ? ? ?                            



                          +---------------------                           



                          --+-------------------                           



                          --+-------------------                           



                          ------+| ?30-59 ? ? ?                           



                          ? ? ? ? ?| ?Stage                           



                          three ? ? ? ?| ? Stage                           



                          three ? ? ? ? ?                           



                          +---------------------                           



                          --+-------------------                           



                          --+-------------------                           



                          ------+| ?15-29 ? ? ?                           



                          ? ? ? ? ?| ?Stage four                           



                          ? ? ? ? | ? Stage four                           



                          ? ? ? ? ?                              



                          ?+--------------------                           



                          ---+------------------                           



                          ---+------------------                           



                          -------+| ?<15 (or                           



                          dialysis) ? ?| ?Stage                           



                          five ? ? ? ? | ? Stage                           



                          five ? ? ? ? ?                           



                          ?+--------------------                           



                          ---+------------------                           



                          ---+------------------                           



                          -------+ *Each stage                           



                          assumes the associated                           



                          GFR level has been in                           



                          effect for at least                           



                          three months. ?Stages                           



                          1 to 5, with or                           



                          without kidney                           



                          disease, indicate                           



                          chronic kidney                           



                          disease. Notes:                           



                          Determination of                           



                          stages one and two                           



                          (with eGFR                             



                          >59mL/min/1.73 m2)                           



                          requires estimation of                           



                          kidney damage for at                           



                          least three months as                           



                          defined by structural                           



                          or functional                           



                          abnormalities of the                           



                          kidney, manifested by                           



                          either:Pathological                           



                          abnormalities or                           



                          Markers of kidney                           



                          damage (including                           



                          abnormalities in the                           



                          composition of the                           



                          blood or urine or                           



                          abnormalities in                           



                          imaging tests).                           

 

             Lab Interpretation Abnormal                               



             (test code = 51698-6)                                        



[facility] WITH [id number] 22:51:57





             Test Item    Value        Reference Range Interpretation Comments

 

             WBC (test code =              See_Comment  H             [Automated



             9890-2)                                             message] The sy

stem



                                                                 which generated



                                                                 this result



                                                                 transmitted



                                                                 reference range

:



                                                                 4.20 - 10.70



                                                                 10*3/?L. The



                                                                 reference range

 was



                                                                 not used to



                                                                 interpret this



                                                                 result as



                                                                 normal/abnormal

.

 

             RBC (test code =              See_Comment                [Automated



             169-8)                                              message] The sy

stem



                                                                 which generated



                                                                 this result



                                                                 transmitted



                                                                 reference range

:



                                                                 4.26 - 5.52



                                                                 10*6/?L. The



                                                                 reference range

 was



                                                                 not used to



                                                                 interpret this



                                                                 result as



                                                                 normal/abnormal

.

 

             HGB (test code = 14.7 g/dL    12.2-16.4                 



             718-7)                                              

 

             HCT (test code = 43.7 %       38.4-49.3                 



             4544-3)                                             

 

             MCV (test code = 85.9 fL      81.7-95.6                 



             787-2)                                              

 

             MCH (test code = 28.9 pg      26.1-32.7                 



             785-6)                                              

 

             MCHC (test code = 33.6 g/dL    31.2-35.0                 



             786-4)                                              

 

             RDW-SD (test code = 42.4 fL      38.5-51.6                 



             11128-1)                                            

 

             RDW-CV (test code = 13.6 %       12.1-15.4                 



             788-0)                                              

 

             PLT (test code =              See_Comment  H             [Automated



             777-3)                                              message] The sy

stem



                                                                 which generated



                                                                 this result



                                                                 transmitted



                                                                 reference range

:



                                                                 150 - 328 10*3/

?L.



                                                                 The reference r

zhen



                                                                 was not used to



                                                                 interpret this



                                                                 result as



                                                                 normal/abnormal

.

 

             MPV (test code = 9.4 fL       9.8-13.0     L            



             57837-2)                                            

 

             NRBC/100 WBC (test              See_Comment                [Automat

ed



             code = 1411725220)                                        message] 

The system



                                                                 which generated



                                                                 this result



                                                                 transmitted



                                                                 reference range

:



                                                                 0.0 - 10.0 /100



                                                                 WBCs. The refer

ence



                                                                 range was not u

sed



                                                                 to interpret th

is



                                                                 result as



                                                                 normal/abnormal

.

 

             NRBC x10^3 (test code <0.01        See_Comment                [Auto

mated



             = [id number])                                        message] The s

ystem



                                                                 which generated



                                                                 this result



                                                                 transmitted



                                                                 reference range

:



                                                                 10*3/?L. The



                                                                 reference range

 was



                                                                 not used to



                                                                 interpret this



                                                                 result as



                                                                 normal/abnormal

.

 

             GRAN MAT (NEUT) % 76.4 %                                 



             (test code = 770-8)                                        

 

             IMM GRAN % (test code 0.40 %                                 



             = 9105972592)                                        

 

             LYMPH % (test code = 16.3 %                                 



             736-9)                                              

 

             MONO % (test code = 5.6 %                                  



             5905-5)                                             

 

             EOS % (test code = 1.0 %                                  



             713-8)                                              

 

             BASO % (test code = 0.3 %                                  



             706-2)                                              

 

             GRAN MAT x10^3(ANC) 8.81 10*3/uL 1.99-6.95    H            



             (test code =                                        



             2094787030)                                         

 

             IMM GRAN x10^3 (test 0.05 10*3/uL 0.00-0.06                 



             code = 8652073280)                                        

 

             LYMPH x10^3 (test code 1.88 10*3/uL 1.09-3.23                 



             = 731-0)                                            

 

             MONO x10^3 (test code 0.64 10*3/uL 0.36-1.02                 



             = 742-7)                                            

 

             EOS x10^3 (test code = 0.12 10*3/uL 0.06-0.53                 



             711-2)                                              

 

             BASO x10^3 (test code 0.03 10*3/uL 0.01-0.09                 



             = 704-7)                                            

 

             Lab Interpretation Abnormal                               



             (test code = 99900-4)                                        



[facility] 
01:01:33





             Test Item    Value        Reference Range Interpretation Comments

 

             SARS-CoV-2 Rapid ID NOW Not Detected Not Detected              



             (test code = 80615-7)                                        

 

             MAL (test code = MAL) ID NOW COVID-19 Assay                        

   



                          is an isothermal                           



                          nucleic acid                           



                          amplification test                           



                          intended for the                           



                          qualitative detection                           



                          of nucleic acid from                           



                          SARS-CoV-2 viral RNA                           



                          in nasopharyngeal (NP)                           



                          specimens. It is used                           



                          under Emergency Use                           



                          Authorization (EUA) by                           



                          FDA. The limit of                           



                          detection (LOD) of the                           



                          assay is 125 Genome                           



                          Equivalents/mL. A                           



                          positive result is                           



                          indicative of the                           



                          presence of SARS-CoV-2                           



                          RNA. ?Clinical                           



                          correlation with                           



                          patient history and                           



                          other diagnostic                           



                          information is                           



                          necessary to determine                           



                          patient infection                           



                          status. A negative                           



                          (Not Detected) result                           



                          does not preclude                           



                          SARS-CoV-2 infection.                           



                          In patients with                           



                          clinical symptoms and                           



                          other tests that are                           



                          consistent with                           



                          SARS-CoV-2 infection,                           



                          negative results                           



                          should be treated as                           



                          presumptive negative                           



                          and a new specimen                           



                          should be tested with                           



                          alternative PCR                           



                          molecular test.                           



                          Invalid: Please                           



                          collect a new specimen                           



                          for repeat patient                           



                          testing if clinically                           



                          indicated.                             

 

             Lab Interpretation Normal                                 



             (test code = 23051-6)                                        



[facility]. METABOLIC PANEL ([id number])2021-05-10 
01:00:33





             Test Item    Value        Reference Range Interpretation Comments

 

             NA (test code = 140 mmol/L   135-145                   



             3743805560)                                         

 

             K (test code = 4.4 mmol/L   3.5-5.0                   



             0333605041)                                         

 

             CL (test code = 103 mmol/L                       



             4574693808)                                         

 

             CO2 TOTAL (test code = 28 mmol/L    23-31                     



             1026571054)                                         

 

             AGAP (test code =              2-16                      



             5325187430)                                         

 

             BUN (test code = 15 mg/dL     7-23                      



             7693845059)                                         

 

             GLUCOSE (test code = 85 mg/dL                         



             7460851520)                                         

 

             CREATININE (test code = 0.60 mg/dL   0.60-1.25                 



             1724498431)                                         

 

             TOTAL BILI (test code = 1.1 mg/dL    0.1-1.1                   



             2387224874)                                         

 

             CALCIUM (test code = 9.0 mg/dL    8.6-10.6                  



             0959269427)                                         

 

             T PROTEIN (test code = 7.8 g/dL     6.3-8.2                   



             6581896164)                                         

 

             ALBUMIN (test code = 4.0 g/dL     3.5-5.0                   



             9256933313)                                         

 

             ALK PHOS (test code = 166 U/L             H            



             6639433678)                                         

 

             ALTv (test code = 42 U/L       5-50                      



             1742-6)                                             

 

             AST(SGOT) (test code = 32 U/L       13-40                     



             5591262618)                                         

 

             eGFR (test code =              mL/min/1.73m2              



             8441528307)                                         

 

             MAL (test code = MAL) Association of                           



                          Glomerular Filtration                           



                          Rate (GFR) and Staging                           



                          of Kidney Disease*                           



                          +---------------------                           



                          --+-------------------                           



                          --+-------------------                           



                          ------+| GFR                           



                          (mL/min/1.73 m2) ?|                           



                          With Kidney Damage ?|                           



                          ?Without Kidney                           



                          Damage+---------------                           



                          --------+-------------                           



                          --------+-------------                           



                          ------------+| ?>90 ?                           



                          ? ? ? ? ? ? ? ?|                           



                          ?Stage one ? ? ? ? ?|                           



                          ? Normal ? ? ? ? ? ? ?                           



                          ?+--------------------                           



                          ---+------------------                           



                          ---+------------------                           



                          -------+| ?60-89 ? ? ?                           



                          ? ? ? ? ?| ?Stage two                           



                          ? ? ? ? ?| ? Decreased                           



                          GFR ? ? ? ?                            



                          +---------------------                           



                          --+-------------------                           



                          --+-------------------                           



                          ------+| ?30-59 ? ? ?                           



                          ? ? ? ? ?| ?Stage                           



                          three ? ? ? ?| ? Stage                           



                          three ? ? ? ? ?                           



                          +---------------------                           



                          --+-------------------                           



                          --+-------------------                           



                          ------+| ?15-29 ? ? ?                           



                          ? ? ? ? ?| ?Stage four                           



                          ? ? ? ? | ? Stage four                           



                          ? ? ? ? ?                              



                          ?+--------------------                           



                          ---+------------------                           



                          ---+------------------                           



                          -------+| ?<15 (or                           



                          dialysis) ? ?| ?Stage                           



                          five ? ? ? ? | ? Stage                           



                          five ? ? ? ? ?                           



                          ?+--------------------                           



                          ---+------------------                           



                          ---+------------------                           



                          -------+ *Each stage                           



                          assumes the associated                           



                          GFR level has been in                           



                          effect for at least                           



                          three months. ?Stages                           



                          1 to 5, with or                           



                          without kidney                           



                          disease, indicate                           



                          chronic kidney                           



                          disease. Notes:                           



                          Determination of                           



                          stages one and two                           



                          (with eGFR                             



                          >59mL/min/1.73 m2)                           



                          requires estimation of                           



                          kidney damage for at                           



                          least three months as                           



                          defined by structural                           



                          or functional                           



                          abnormalities of the                           



                          kidney, manifested by                           



                          either:Pathological                           



                          abnormalities or                           



                          Markers of kidney                           



                          damage (including                           



                          abnormalities in the                           



                          composition of the                           



                          blood or urine or                           



                          abnormalities in                           



                          imaging tests).                           

 

             Lab Interpretation Abnormal                               



             (test code = 55257-1)                                        



[facility]13





             Test Item    Value        Reference Range Interpretation Comments

 

             LIPASE (test code = 9481691620) 57 U/L       0-220                 

    

 

             Lab Interpretation (test code = Normal                             

    



             57817-4)                                            



[facility] 00:51:55





             Test Item    Value        Reference Range Interpretation Comments

 

             APPEARANCE (test code = Turbid       Clear        A            



             0496954247)                                         

 

             COLOR (test code = Yellow       Yellow                    



             6005029918)                                         

 

             PH (test code =              4.8-8.0                   



             6305973889)                                         

 

             SP GRAVITY (test code =              1.003-1.030               



             9985764209)                                         

 

             GLU U QUAL (test code = Normal       Normal                    



             5066724532)                                         

 

             BLOOD (test code = 1+           Negative     A            



             6395104203)                                         

 

             KETONES (test code = 20 mg/dL     Negative     A            



             6268887486)                                         

 

             PROTEIN (test code = 100 mg/dL    Negative     A            



             2887-8)                                             

 

             UROBILIN (test code = 2.0 mg/dL    Normal       A            



             6973866150)                                         

 

             BILIRUBIN (test code = Negative     Negative                  



             4429208365)                                         

 

             NITRITE (test code = Positive     Negative     A            



             8772891429)                                         

 

             LEUK FRANCE (test code = 250/uL       Negative     A            



             9231151249)                                         

 

             RBC/HPF (test code =              See_Comment  H             [Autom

ated message]



             5032166386)                                         The system [employer]



                                                                 generated this



                                                                 result transmit

huma



                                                                 reference range

: 0 -



                                                                 3 HPF. The refe

rence



                                                                 range was not u

sed



                                                                 to interpret th

is



                                                                 result as



                                                                 normal/abnormal

.

 

             WBC/HPF (test code = >182         See_Comment  H             [Autom

ated message]



             3415872416)                                         The system [employer]



                                                                 generated this



                                                                 result transmit

huma



                                                                 reference range

: 0 -



                                                                 5 HPF. The refe

rence



                                                                 range was not u

sed



                                                                 to interpret th

is



                                                                 result as



                                                                 normal/abnormal

.

 

             BACTERIA (test code = Many         Negative     A            



             3752357473)                                         

 

             MUCOUS (test code = Moderate     Negative LPF A            



             8893399136)                                         

 

             WBC CLUMPS (test code =              See_Comment  H             [Au

tomated message]



             1895397382)                                         The system [employer]



                                                                 generated this



                                                                 result transmit

huma



                                                                 reference range

: <=1



                                                                 HPF. The refere

nce



                                                                 range was not u

sed



                                                                 to interpret th

is



                                                                 result as



                                                                 normal/abnormal

.

 

             Lab Interpretation (test Abnormal                               



             code = 88562-2)                                        



[facility] WITH DIFF2021-05-10 00:46:14





             Test Item    Value        Reference Range Interpretation Comments

 

             WBC (test code =              See_Comment                [Automated



             1790-2)                                             message] The sy

stem



                                                                 which generated



                                                                 this result



                                                                 transmitted



                                                                 reference range

:



                                                                 4.20 - 10.70



                                                                 10*3/?L. The



                                                                 reference range

 was



                                                                 not used to



                                                                 interpret this



                                                                 result as



                                                                 normal/abnormal

.

 

             RBC (test code =              See_Comment                [Automated



             889-8)                                              message] The sy

stem



                                                                 which generated



                                                                 this result



                                                                 transmitted



                                                                 reference range

:



                                                                 4.26 - 5.52



                                                                 10*6/?L. The



                                                                 reference range

 was



                                                                 not used to



                                                                 interpret this



                                                                 result as



                                                                 normal/abnormal

.

 

             HGB (test code = 15.9 g/dL    12.2-16.4                 



             718-7)                                              

 

             HCT (test code = 47.1 %       38.4-49.3                 



             4544-3)                                             

 

             MCV (test code = 86.6 fL      81.7-95.6                 



             787-2)                                              

 

             MCH (test code = 29.2 pg      26.1-32.7                 



             785-6)                                              

 

             MCHC (test code = 33.8 g/dL    31.2-35.0                 



             786-4)                                              

 

             RDW-SD (test code = 47.6 fL      38.5-51.6                 



             88562-2)                                            

 

             RDW-CV (test code = 15.2 %       12.1-15.4                 



             788-0)                                              

 

             PLT (test code =              See_Comment  H             [Automated



             777-3)                                              message] The sy

stem



                                                                 which generated



                                                                 this result



                                                                 transmitted



                                                                 reference range

:



                                                                 150 - 328 10*3/

?L.



                                                                 The reference r

zhen



                                                                 was not used to



                                                                 interpret this



                                                                 result as



                                                                 normal/abnormal

.

 

             MPV (test code = 9.4 fL       9.8-13.0     L            



             70798-0)                                            

 

             NRBC/100 WBC (test              See_Comment                [Automat

ed



             code = 8233251293)                                        message] 

The system



                                                                 which generated



                                                                 this result



                                                                 transmitted



                                                                 reference range

:



                                                                 0.0 - 10.0 /100



                                                                 WBCs. The refer

ence



                                                                 range was not u

sed



                                                                 to interpret th

is



                                                                 result as



                                                                 normal/abnormal

.

 

             NRBC x10^3 (test code <0.01        See_Comment                [Auto

mated



             = 6811238877)                                        message] The s

ystem



                                                                 which generated



                                                                 this result



                                                                 transmitted



                                                                 reference range

:



                                                                 10*3/?L. The



                                                                 reference range

 was



                                                                 not used to



                                                                 interpret this



                                                                 result as



                                                                 normal/abnormal

.

 

             GRAN MAT (NEUT) % 61.3 %                                 



             (test code = 770-8)                                        

 

             IMM GRAN % (test code 0.70 %                                 



             = 1862525313)                                        

 

             LYMPH % (test code = 26.4 %                                 



             736-9)                                              

 

             MONO % (test code = 9.9 %                                  



             5905-5)                                             

 

             EOS % (test code = 1.3 %                                  



             713-8)                                              

 

             BASO % (test code = 0.4 %                                  



             706-2)                                              

 

             GRAN MAT x10^3(ANC) 6.39 10*3/uL 1.99-6.95                 



             (test code =                                        



             4259934178)                                         

 

             IMM GRAN x10^3 (test 0.07 10*3/uL 0.00-0.06    H            



             code = 7372550721)                                        

 

             LYMPH x10^3 (test code 2.75 10*3/uL 1.09-3.23                 



             = 731-0)                                            

 

             MONO x10^3 (test code 1.03 10*3/uL 0.36-1.02    H            



             = 742-7)                                            

 

             EOS x10^3 (test code = 0.14 10*3/uL 0.06-0.53                 



             711-2)                                              

 

             BASO x10^3 (test code 0.04 10*3/uL 0.01-0.09                 



             = 704-7)                                            

 

             Lab Interpretation Abnormal                               



             (test code = 12836-3)                                        



[facility] [id number] 13:03:00





             Test Item    Value        Reference Range Interpretation Comments

 

             POC-GLUCOSE METER 140 mg/dL           H            : TESTED A

T [facility] [address]



             (BEAKER) (test code =                                        [facility],



             [facility])                                               [address]:



                                                                 /Techni

christina ID



                                                                 = [id number] for AMAYA ACOSTA,



                                                                 ANANT



POCT-GLUCOSE [id number] 09:15:00





             Test Item    Value        Reference Range Interpretation Comments

 

             POC-GLUCOSE METER 142 mg/dL           H            : TESTED A

T [facility] [address]



             (BEAKER) (test code =                                        [facility],



             [facility])                                               [address]:



                                                                 /Techni

christina ID



                                                                 = [id number] for AMAYA ACOSTA,



                                                                 ANANT



POCT-GLUCOSE METER2020-01-15 20:56:00





             Test Item    Value        Reference Range Interpretation Comments

 

             POC-GLUCOSE METER 134 mg/dL           H            : TESTED A

T [facility] [address]



             (BEAKER) (test code =                                        [facility],



             [facility])                                               [address]:



                                                                 /Techni

christina ID



                                                                 = [id number] for SHERRILL GUARDADO



POCT-GLUCOSE METER2020-01-15 16:46:00





             Test Item    Value        Reference Range Interpretation Comments

 

             POC-GLUCOSE METER 91 mg/dL                         : TESTED A

T [facility] [address]



             (BEAKER) (test code =                                        [facility],



             [facility])                                               [address]:



                                                                 /Techni

christina ID =



                                                                 [id number] for CATINA LAKHANI,



                                                                 ANANT



POCT-GLUCOSE METER2020-01-15 12:19:00





             Test Item    Value        Reference Range Interpretation Comments

 

             POC-GLUCOSE METER 237 mg/dL           H            : TESTED SALOME ARREOLA [facility] [address]



             (KUN) (test code =                                        [place],



             [address])                                               [address]:



                                                                 /Techni

christina ID



                                                                 = [id number] for ANANT CATES



MR, EXTREMITY, LOWER, WITHOUT CONTRAST, RIGHT2020-01-15 09:12:00FINAL REPORT 
PATIENT ID: [id number] MRI of the right and left hindfoot without intravenous 
contrast History: Osteomyelitis, diabetic foot Comparison: Radiograph dated 
2020 Technique: Multiplanar multisequence MRI of the right and left 
hindfoot was performed without intravenous contrast using the hindfoot 
osteomyelitis protocol Findings: Right foot: Marked T1 and T2 hypointense soft 
tissue thickening is noted at the medial aspect of the right heel, likely to 
reflect scar tissue. There is also mild subcutaneous edema at the lateral aspect
of the mid foot and forefoot, incompletely imaged. Nodiscrete drainable fluid 
collection is identified. There is no marrow signal abnormality to suggest o
steomyelitis. There is a small nonspecific joint effusion at the ankle and 
subtalar joint. Scattereddegenerative changes are noted. There is marked fatty 
atrophy of the distal leg and foot musculature. Severe flexor hallucis longus 
tendinopathy. No tenosynovitis. Left foot: There is a large area of soft tissue 
defect and granulation tissue at the posteromedial aspect of the heel, reaching 
the calcaneus, but there is no drainable fluid collection. Deformity is noted in
the hindfoot, and the forefootappears adducted. No acute fracture. No marrow 
signal abnormality is identified to indicate acute osteomyelitis. There is no 
significant joint effusion. There is severe atrophy of the foot and distal leg 
musculature. No significant tenosynovitis identified. IMPRESSION: 
Granulation/scar tissue at the medial aspect of the heel bilaterally. No MR 
evidence of osteomyelitis. Severe muscle atrophy. Signed:Nguyen Cornejo 
Verified Date/Time: 01/15/2020 09:12:01 Reading Location: [facility] B1 C013X Ortho 
Consult Reading Room Electronically signed by: NGUYEN CORNEJO M.D. on 01/15/2020 
09:12 AMMR, EXTREMITY, LOWER, WITHOUT CONTRAST, LEFT2020-01-15 09:12:00FINAL 
REPORT PATIENT ID: [id number] MRI of the right and left hindfoot without 
intravenous contrast History: Osteomyelitis, diabetic foot Comparison: 
Radiograph dated 2020 Technique: Multiplanar multisequence MRI of the
right and left hindfoot was performed without intravenous contrast using the 
hindfoot osteomyelitis protocol Findings: Right foot: Marked T1 and T2 
hypointense soft tissue thickening is noted at the medial aspect of the right 
heel, likely to reflect scar tissue. There is also mild subcutaneous edema at 
the lateral aspect of the mid foot and forefoot, incompletely imaged. Nodiscrete
drainable fluid collection is identified. There is no marrow signal abnormality 
to suggest osteomyelitis. There is a small nonspecific joint effusion at the 
ankle and subtalar joint. Scattereddegenerative changes are noted. There is 
marked fatty atrophy of the distal leg and foot musculature. Severe flexor 
hallucis longus tendinopathy. No tenosynovitis. Left foot: There is a large area
of soft tissue defect and granulation tissue at the posteromedial aspect of the 
heel, reaching the calcaneus, but there is no drainable fluid collection. 
Deformity is noted in the hindfoot, and the forefootappears adducted. No acute 
fracture. No marrow signal abnormality is identified to indicate acute ost
eomyelitis. There is no significant joint effusion. There is severe atrophy of 
the foot and distal leg musculature. No significant tenosynovitis identified. 
IMPRESSION: Granulation/scar tissue at the medial aspect of the heel 
bilaterally. No MR evidence of osteomyelitis. Severe muscle atrophy. Signed:Nguyen Cornejo Verified Date/Time: 01/15/2020 09:12:01 Reading Location: [address] Ortho Consult Reading Room Electronically signed by: NGUYEN CORNEJO M.D. on 
01/15/2020 09:12 AMPOCT-GLUCOSE METER2020-01-15 08:47:00





             Test Item    Value        Reference Range Interpretation Comments

 

             POC-GLUCOSE METER 264 mg/dL           H            : TESTED SALOME ARREOLA [facility] [address]



             (BETAL) (test code =                                        [place],



             [address])                                               [address]:



                                                                 /Techni

christina ID



                                                                 = [id number] for ANANT CATES



ISLET CELL AB SCR2020-01-15 07:43:00





             Test Item    Value        Reference Range Interpretation Comments

 

             ISLET CELL AB Refer to individual                           



             AUTOVERIFICATION (test Islet Cell Ab                           



             code = 2556) and/or Islet Cell                           



                          Ab Titer results.                           



POCT-GLUCOSE [id number] 21:27:00





             Test Item    Value        Reference Range Interpretation Comments

 

             POC-GLUCOSE METER 130 mg/dL           H            : TESTED A

T [facility] [address]



             ([facility]) (test code =                                        [facility],



             )                                               [address]:



                                                                 /Techni

christina ID



                                                                 = [id number] for KRANTHI PIERRE



BLOOD [id number] 13:00:00





             Test Item    Value        Reference Range Interpretation Comments

 

             CULTURE (BEAKER) (test No growth in 5 days                         

  



             code = 1095)                                        



BLOOD [id number] 13:00:00





             Test Item    Value        Reference Range Interpretation Comments

 

             CULTURE (BEAKER) (test No growth in 5 days                         

  



             code = 1095)                                        



POCT-GLUCOSE [id number] 12:57:00





             Test Item    Value        Reference Range Interpretation Comments

 

             POC-GLUCOSE METER 138 mg/dL           H            : TESTED A

T [facility] [address]



             ([facility]) (test code =                                        [facility],



             )                                               [address]:



                                                                 /Techni

christina ID



                                                                 = [id number] for [place]

LLIS,



                                                                 BARBARA



POCT-GLUCOSE [id number] 08:43:00





             Test Item    Value        Reference Range Interpretation Comments

 

             POC-GLUCOSE METER 226 mg/dL           H            : TESTED A

T [facility] [address]



             ([facility]) (test code =                                        [facility],



             )                                               [address]:



                                                                 /Techni

christina ID



                                                                 = [id number] for [place]

LLIS,



                                                                 BARBARA



POCT-GLUCOSE [id number] 21:11:00





             Test Item    Value        Reference Range Interpretation Comments

 

             POC-GLUCOSE METER 254 mg/dL           H            : Notified

 RN/MD:



             ([facility]) (test code =                                        TESTED

 AT [facility] [address]



             )                                               [facility],



                                                                 [address]:



                                                                 /Techni

christina ID



                                                                 = [id number] for KRANTHI PIERRE



POCT-GLUCOSE [id number] 21:02:00





             Test Item    Value        Reference Range Interpretation Comments

 

             POC-GLUCOSE METER 177 mg/dL           H            : TESTED A

T [facility] [address]



             ([facility]) (test code =                                        [facility],



             )                                               [id number]:



                                                                 /Techni

christina ID



                                                                 = [id number] for [place]

LLIS,



                                                                 BARBARA



POCT-GLUCOSE [id number] 12:31:00





             Test Item    Value        Reference Range Interpretation Comments

 

             POC-GLUCOSE METER 215 mg/dL           H            : TESTED A

T [facility] [address]



             (BEAKER) (test code =                                        [place],



             1538)                                               [address]:



                                                                 /Techni

christina ID



                                                                 = [id number] for [place]

LLIS,



                                                                 BARBARA



WOUND CULTURE + GRAM [id number] 10:10:00





             Test Item    Value        Reference    Interpretation Comments



                                       Range                     

 

             CULTURE (BEAKER) PROTEUS MIRABILIS              A            1+ Pro

teus



             (test code = 1095)                                        mirabilis

 

             Amikacin (test code =                           S            



             1)                                                  

 

             Ampicillin +                           S            



             Sulbactam (test code                                        



             = 6)                                                

 

             Aztreonam (test code                           S            



             = 32)                                               

 

             Cefepime (test code =                           S            



             51)                                                 

 

             Cefoxitin (test code                           R            



             = 68)                                               

 

             Ceftazidime (test                           S            



             code = 27)                                          

 

             Ceftriaxone (test                           S            



             code = 52)                                          

 

             Ertapenem (test code                           S            



             = 38)                                               

 

             Gentamicin (test code                           S            



             = 18)                                               

 

             Levofloxacin (test                           R            



             code = 22)                                          

 

             Meropenem (test code                           S            



             = 34)                                               

 

             Piperacillin +                           S            



             Tazobactam (test code                                        



             = 29)                                               

 

             Tetracycline (test                           R            



             code = 2)                                           

 

             Tobramycin (test code                           S            



             = 25)                                               

 

             Trimethoprim +                           R            



             Sulfamethoxazole                                        



             (test code = 47)                                        

 

             CULTURE (BEAKER) METHICILLIN               A            1+ Methicil

carine



             (test code = 1095) RESISTANT                              resistant



                          STAPHYLOCOCCUS                           Staphylococcu

s



                          AUREUS                                 aureus

 

             Clindamycin (test                           R            



             code = 10)                                          

 

             Erythromycin (test                           R            



             code = 4)                                           

 

             Linezolid (test code                           S            



             = 40)                                               

 

             Nitrofurantoin (test                           S            



             code = 23)                                          

 

             Oxacillin (test code                           R            



             = 14)                                               

 

             Rifampin (test code =                           S            



             43)                                                 

 

             Tetracycline (test                           S            



             code = 2)                                           

 

             Trimethoprim +                           S            



             Sulfamethoxazole                                        



             (test code = 47)                                        

 

             Vancomycin (test code                           S            



             = 13)                                               

 

             CULTURE (BEAKER) ESCHERICHIA COLI              A            <1+ Esc

herichia



             (test code = 1095)                                        coliESBL 

Positive

 

             Amikacin (test code =                           S            



             1)                                                  

 

             Ampicillin +                           R            



             Sulbactam (test code                                        



             = 6)                                                

 

             Aztreonam (test code                           R            



             = 32)                                               

 

             Cefepime (test code =                           R            



             51)                                                 

 

             Cefoxitin (test code                           R            



             = 68)                                               

 

             Ceftazidime (test                           R            



             code = 27)                                          

 

             Ceftriaxone (test                           R            



             code = 52)                                          

 

             Ertapenem (test code                           S            



             = 38)                                               

 

             Gentamicin (test code                           S            



             = 18)                                               

 

             Levofloxacin (test                           R            



             code = 22)                                          

 

             Meropenem (test code                           S            



             = 34)                                               

 

             Nitrofurantoin (test                           S            



             code = 23)                                          

 

             Piperacillin +                           R            



             Tazobactam (test code                                        



             = 29)                                               

 

             Tetracycline (test                           S            



             code = 2)                                           

 

             Tobramycin (test code                           S            



             = 25)                                               

 

             Trimethoprim +                           R            



             Sulfamethoxazole                                        



             (test code = 47)                                        

 

             CULTURE (BEAKER)                           A            Beta-hemoly

tic



             (test code = 1095)                                        streptoco

ccus



                                                                 group B, by



                                                                 serological



                                                                 grouping

 

             GRAM STAIN RESULT 3+ WBCs                                



             (BEAKER) (test code =                                        



             1123)                                               

 

             GRAM STAIN RESULT 1+ gram negative                           



             (BEAKER) (test code = rods                                   



             334013)                                             

 

             GRAM STAIN RESULT 2+ gram positive                           



             (BEAKER) (test code = rods                                   



             555399)                                             

 

             GRAM STAIN RESULT <1+ gram negative                           



             (BEAKER) (test code = coccobacilli                           



             652135)                                             

 

             GRAM STAIN RESULT 2+ gram positive                           



             (BEAKER) (test code = cocci in pairs                           



             572955)                                             



POCT-GLUCOSE [id number] 08:52:00





             Test Item    Value        Reference Range Interpretation Comments

 

             POC-GLUCOSE METER 209 mg/dL           H            : TESTED A

T [facility] [address]



             (BEAKER) (test code =                                        [facility],



             153)                                               [address]:



                                                                 /Techni

christina ID



                                                                 = [id number] for [place]

CRESENCIO,



                                                                 BARBARA



POCT-GLUCOSE [id number] 21:26:00





             Test Item    Value        Reference Range Interpretation Comments

 

             POC-GLUCOSE METER 255 mg/dL           H            : TESTED A

T [facility] [address]



             (BEAKER) (test code =                                        [facility],



             153)                                               [address]:



                                                                 /Techni

christina ID



                                                                 = [id number] for CR

RADHA,



                                                                 TIA



POCT-GLUCOSE [id number] 17:34:00





             Test Item    Value        Reference Range Interpretation Comments

 

             POC-GLUCOSE METER 227 mg/dL           H            : TESTED A

T [facility] [address]



             (BEAKER) (test code =                                        [facility],



             153)                                               [address]:



                                                                 /Techni

christina ID



                                                                 = [id number] for WASSERMAN

NNLISSA,



                                                                 NAKITA



POCT-GLUCOSE [id number] 11:28:00





             Test Item    Value        Reference Range Interpretation Comments

 

             POC-GLUCOSE METER 282 mg/dL           H            : TESTED A

T [facility] [address]



             (BEAKER) (test code =                                        [facility],



             153)                                               [address]:



                                                                 /Techni

christina ID



                                                                 = [id number] for NAKITA JENSEN



POCT-GLUCOSE [id number] 08:19:00





             Test Item    Value        Reference Range Interpretation Comments

 

             POC-GLUCOSE METER 329 mg/dL           H            : TESTED A

T [facility] [address]



             (BEAKER) (test code =                                        [facility],



             153)                                               [address]:



                                                                 /Techni

christina ID



                                                                 = [id number] for ANANT CATES



POCT-GLUCOSE [id number] 21:32:00





             Test Item    Value        Reference Range Interpretation Comments

 

             POC-GLUCOSE METER 275 mg/dL           H            : TESTED A

T [facility] [address]



             (BEAKER) (test code =                                        [facility],



             153)                                               [address]:



                                                                 /Techni

christina ID



                                                                 = [id number] for SHERRILL GUARDADO



POCT-GLUCOSE [id number] 17:47:00





             Test Item    Value        Reference Range Interpretation Comments

 

             POC-GLUCOSE METER 304 mg/dL           H            : TESTED A

T [facility] [address]



             (BEAKER) (test code =                                        [facility],



             153)                                               [address]:



                                                                 /Techni

christina ID



                                                                 = [id number] for HAWK WAN,



                                                                 LUIZA



URINE [id number] 15:21:00





             Test Item    Value        Reference Range Interpretation Comments

 

             CULTURE (Codenvy)                           A            >100,000 co

l/mL



             (test code = 1095)                                        Beta-hemo

lytic



                                                                 streptococcus g

roup



                                                                 B, by serologic

al



                                                                 grouping

 

             CULTURE (Codenvy) PROTEUS                   A            80-89,000 c

ol/mL



             (test code = 1095) MIRABILIS                              Proteus



                                                                 mirabilisESBL



                                                                 Positive

 

             Amikacin (test code =                           S            



             1)                                                  

 

             Ampicillin +                           R            



             Sulbactam (test code                                        



             = 6)                                                

 

             Aztreonam (test code                           R            



             = 32)                                               

 

             Cefepime (test code =                           R            



             51)                                                 

 

             Cefoxitin (test code                           R            



             = 68)                                               

 

             Ceftazidime (test                           R            



             code = 27)                                          

 

             Ceftriaxone (test                           R            



             code = 52)                                          

 

             Ertapenem (test code                           S            



             = 38)                                               

 

             Gentamicin (test code                           R            



             = 18)                                               

 

             Levofloxacin (test                           R            



             code = 22)                                          

 

             Meropenem (test code                           S            



             = 34)                                               

 

             Nitrofurantoin (test                           R            



             code = 23)                                          

 

             Tetracycline (test                           R            



             code = 2)                                           

 

             Tobramycin (test code                           R            



             = 25)                                               



POCT-GLUCOSE [id number] 12:16:00





             Test Item    Value        Reference Range Interpretation Comments

 

             POC-GLUCOSE METER 337 mg/dL           H            : TESTED A

T [facility] [address]



             (BEAKER) (test code =                                        [facility],



             1538)                                               [address]:



                                                                 /Techni

christina ID



                                                                 = [id number] for LUIZA FIGUEROA



BASIC METABOLIC [id number] 10:39:00





             Test Item    Value        Reference Range Interpretation Comments

 

             SODIUM (BEAKER) 134 meq/L    136-145      L            



             (test code = 381)                                        

 

             POTASSIUM (BEAKER) 4.6 meq/L    3.5-5.1                   



             (test code = 379)                                        

 

             CHLORIDE (BEAKER) 105 meq/L                        



             (test code = 382)                                        

 

             CO2 (BEAKER) (test 20 meq/L     22-29        L            



             code = 355)                                         

 

             BLOOD UREA NITROGEN 14 mg/dL     7-21                      



             (BEAKER) (test code                                        



             = 354)                                              

 

             CREATININE (BEAKER) 0.97 mg/dL   0.57-1.25                 



             (test code = 358)                                        

 

             GLUCOSE RANDOM 429 mg/dL           HH           



             (BEAKER) (test code                                        



             = 652)                                              

 

             CALCIUM (BEAKER) 8.9 mg/dL    8.4-10.2                  



             (test code = 697)                                        

 

             EGFR (BEAKER) (test 107 mL/min/1.73                           ESTIM

ATED GFR IS



             code = 1092) sq m                                   NOT AS ACCURATE

 AS



                                                                 CREATININE



                                                                 CLEARANCE IN



                                                                 PREDICTING



                                                                 GLOMERULAR



                                                                 FILTRATION RATE

.



                                                                 ESTIMATED GFR I

S



                                                                 NOT APPLICABLE 

FOR



                                                                 DIALYSIS PATIEN

TS.



 ID - MAGAN FPOCT-GLUCOSE [id number] 08:29:00





             Test Item    Value        Reference Range Interpretation Comments

 

             POC-GLUCOSE METER 395 mg/dL           H            : TESTED A

T [facility] [address]



             (BEAKER) (test code =                                        [facility],



             1538)                                               [address]:



                                                                 /Techni

christina ID



                                                                 = [id number] for LUIZA FIGUEROA



CBC W/PLT COUNT &amp; AUTO [id number] 06:40:00





             Test Item    Value        Reference Range Interpretation Comments

 

             WHITE BLOOD CELL COUNT (BEAKER) 7.2 K/ L     3.5-10.5              

    



             (test code = 775)                                        

 

             RED BLOOD CELL COUNT (BEAKER) 5.48 M/ L    4.63-6.08               

  



             (test code = 761)                                        

 

             HEMOGLOBIN (BEAKER) (test code = 15.1 GM/DL   13.7-17.5            

     



             410)                                                

 

             HEMATOCRIT (BEAKER) (test code = 46.4 %       40.1-51.0            

     



             411)                                                

 

             MEAN CORPUSCULAR VOLUME (BEAKER) 84.7 fL      79.0-92.2            

     



             (test code = 753)                                        

 

             MEAN CORPUSCULAR HEMOGLOBIN 27.6 pg      25.7-32.2                 



             (BEAKER) (test code = 751)                                        

 

             MEAN CORPUSCULAR HEMOGLOBIN CONC 32.5 GM/DL   32.3-36.5            

     



             (BEAKER) (test code = 752)                                        

 

             RED CELL DISTRIBUTION WIDTH 15.5 %       11.6-14.4    H            



             (BEAKER) (test code = 412)                                        

 

             PLATELET COUNT (BEAKER) (test 315 K/CU MM  150-450                 

  



             code = 756)                                         

 

             MEAN PLATELET VOLUME (BEAKER) 10.5 fL      9.4-12.4                

  



             (test code = 754)                                        

 

             NUCLEATED RED BLOOD CELLS 0 /100 WBC   0-0                       



             (BEAKER) (test code = 413)                                        

 

             NEUTROPHILS RELATIVE PERCENT 62 %                                  

 



             (BEAKER) (test code = 429)                                        

 

             LYMPHOCYTES RELATIVE PERCENT 26 %                                  

 



             (BEAKER) (test code = 430)                                        

 

             MONOCYTES RELATIVE PERCENT 9 %                                    



             (BEAKER) (test code = 431)                                        

 

             EOSINOPHILS RELATIVE PERCENT 2 %                                   

 



             (BEAKER) (test code = 432)                                        

 

             BASOPHILS RELATIVE PERCENT 0 %                                    



             (BEAKER) (test code = 437)                                        

 

             NEUTROPHILS ABSOLUTE COUNT 4.48 K/ L    1.78-5.38                 



             (BEAKER) (test code = 670)                                        

 

             LYMPHOCYTES ABSOLUTE COUNT 1.87 K/ L    1.32-3.57                 



             (BEAKER) (test code = 414)                                        

 

             MONOCYTES ABSOLUTE COUNT (BEAKER) 0.62 K/ L    0.30-0.82           

      



             (test code = 415)                                        

 

             EOSINOPHILS ABSOLUTE COUNT 0.17 K/ L    0.04-0.54                 



             (BEAKER) (test code = 416)                                        

 

             BASOPHILS ABSOLUTE COUNT (BEAKER) 0.03 K/ L    0.01-0.08           

      



             (test code = 417)                                        

 

             IMMATURE GRANULOCYTES-RELATIVE 1 %          0-1                    

   



             PERCENT (BEAKER) (test code =                                      

  



             2801)                                               



POCT-GLUCOSE METER2020-01-10 19:55:00





             Test Item    Value        Reference Range Interpretation Comments

 

             POC-GLUCOSE METER 369 mg/dL           H            : TESTED A

T [facility] [address]



             (BEAKER) (test code =                                        [facility],



             1538)                                               [id number]:



                                                                 /Techni

christina ID



                                                                 = [id number] for SHERRILL GUARDADO



POCT-GLUCOSE METER2020-01-10 16:45:00





             Test Item    Value        Reference Range Interpretation Comments

 

             POC-GLUCOSE METER 272 mg/dL           H            : TESTED A

T [facility] [address]



             (BEAKER) (test code =                                        [facility],



             1538)                                               [id number]:



                                                                 /Techni

christina ID



                                                                 = [id number] for SARAH VIDES



POCT-GLUCOSE METER2020-01-10 11:40:00





             Test Item    Value        Reference Range Interpretation Comments

 

             POC-GLUCOSE METER 277 mg/dL           H            : TESTED A

T [facility] [address]



             (BEAKER) (test code =                                        [facility],



             1538)                                               [id number]:



                                                                 /Techni

christina ID



                                                                 = [id number] for SARAH VIDES



BASIC METABOLIC PANEL2020-01-10 10:22:00





             Test Item    Value        Reference Range Interpretation Comments

 

             SODIUM (BEAKER) 132 meq/L    136-145      L            



             (test code = 381)                                        

 

             POTASSIUM (BEAKER) 4.4 meq/L    3.5-5.1                   



             (test code = 379)                                        

 

             CHLORIDE (BEAKER) 103 meq/L                        



             (test code = 382)                                        

 

             CO2 (BEAKER) (test 21 meq/L     22-29        L            



             code = 355)                                         

 

             BLOOD UREA NITROGEN 14 mg/dL     7-21                      



             (BEAKER) (test code                                        



             = 354)                                              

 

             CREATININE (BEAKER) 0.89 mg/dL   0.57-1.25                 



             (test code = 358)                                        

 

             GLUCOSE RANDOM 428 mg/dL           HH           



             (BEAKER) (test code                                        



             = 652)                                              

 

             CALCIUM (BEAKER) 9.2 mg/dL    8.4-10.2                  



             (test code = 697)                                        

 

             EGFR (BEAKER) (test 119 mL/min/1.73                           ESTIM

ATED GFR IS



             code = 1092) sq m                                   NOT AS ACCURATE

 AS



                                                                 CREATININE



                                                                 CLEARANCE IN



                                                                 PREDICTING



                                                                 GLOMERULAR



                                                                 FILTRATION RATE

.



                                                                 ESTIMATED GFR I

S



                                                                 NOT APPLICABLE 

FOR



                                                                 DIALYSIS PATIEN

TS.



 ID - NTPLIPID PANEL2020-01-10 10:17:00





             Test Item    Value        Reference Range Interpretation Comments

 

             TRIGLYCERIDES (BEAKER) (test code = 692 mg/dL                      

        



             540)                                                

 

             CHOLESTEROL (BEAKER) (test code = 196 mg/dL                        

      



             631)                                                

 

             HDL CHOLESTEROL (BEAKER) (test code 24 mg/dL                       

        



             = 976)                                              



Calculated LDL not valid if triglyceride &gt;400 mg/dLTriglyceride Reference 
Range: Low Risk &lt;150Borderline 150-199 High Risk 200-499 Very High Risk 
&gt;=500Cholesterol Reference Range: Low Risk &lt;200 Borderline 200-239 High 
Risk &gt;240HDL Cholesterol Reference Range: Low Risk &gt;=60 High Risk&lt;40LDL
Cholesterol Reference Range: Optimal &lt;100 Near Optimal 100-129 Borderline 
130-159 High 160-189 Very High &gt;=190  ID - NTPPOCT-GLUCOSE METER
2020-01-10 08:28:00





             Test Item    Value        Reference Range Interpretation Comments

 

             POC-GLUCOSE METER 388 mg/dL           H            : TESTED A

T [facility] [address]



             (BEAKER) (test code =                                        [place],



             1538)                                               [id number]:



                                                                 /Techni

christina ID



                                                                 = [id number] for ANANT CATES



HEMOGLOBIN A1C2020-01-10 07:54:00





             Test Item    Value        Reference Range Interpretation Comments

 

             HEMOGLOBIN A1C (BEAKER) (test code = 10.4 %       4.3-6.1      H   

         



             368)                                                



CBC W/PLT COUNT &amp; AUTO DIFFERENTIAL2020-01-10 05:56:00





             Test Item    Value        Reference Range Interpretation Comments

 

             WHITE BLOOD CELL COUNT (BEAKER) 6.5 K/ L     3.5-10.5              

    



             (test code = 775)                                        

 

             RED BLOOD CELL COUNT (BEAKER) 5.21 M/ L    4.63-6.08               

  



             (test code = 761)                                        

 

             HEMOGLOBIN (BEAKER) (test code = 14.6 GM/DL   13.7-17.5            

     



             410)                                                

 

             HEMATOCRIT (BEAKER) (test code = 44.3 %       40.1-51.0            

     



             411)                                                

 

             MEAN CORPUSCULAR VOLUME (BEAKER) 85.0 fL      79.0-92.2            

     



             (test code = 753)                                        

 

             MEAN CORPUSCULAR HEMOGLOBIN 28.0 pg      25.7-32.2                 



             (BEAKER) (test code = 751)                                        

 

             MEAN CORPUSCULAR HEMOGLOBIN CONC 33.0 GM/DL   32.3-36.5            

     



             (BEAKER) (test code = 752)                                        

 

             RED CELL DISTRIBUTION WIDTH 15.6 %       11.6-14.4    H            



             (BEAKER) (test code = 412)                                        

 

             PLATELET COUNT (BEAKER) (test 294 K/CU MM  150-450                 

  



             code = 756)                                         

 

             MEAN PLATELET VOLUME (BEAKER) 11.2 fL      9.4-12.4                

  



             (test code = 754)                                        

 

             NUCLEATED RED BLOOD CELLS 0 /100 WBC   0-0                       



             (BEAKER) (test code = 413)                                        

 

             NEUTROPHILS RELATIVE PERCENT 56 %                                  

 



             (BEAKER) (test code = 429)                                        

 

             LYMPHOCYTES RELATIVE PERCENT 31 %                                  

 



             (BEAKER) (test code = 430)                                        

 

             MONOCYTES RELATIVE PERCENT 10 %                                   



             (BEAKER) (test code = 431)                                        

 

             EOSINOPHILS RELATIVE PERCENT 2 %                                   

 



             (BEAKER) (test code = 432)                                        

 

             BASOPHILS RELATIVE PERCENT 1 %                                    



             (BEAKER) (test code = 437)                                        

 

             NEUTROPHILS ABSOLUTE COUNT 3.66 K/ L    1.78-5.38                 



             (BEAKER) (test code = 670)                                        

 

             LYMPHOCYTES ABSOLUTE COUNT 2.03 K/ L    1.32-3.57                 



             (BEAKER) (test code = 414)                                        

 

             MONOCYTES ABSOLUTE COUNT (BEAKER) 0.63 K/ L    0.30-0.82           

      



             (test code = 415)                                        

 

             EOSINOPHILS ABSOLUTE COUNT 0.15 K/ L    0.04-0.54                 



             (BEAKER) (test code = 416)                                        

 

             BASOPHILS ABSOLUTE COUNT (BEAKER) 0.03 K/ L    0.01-0.08           

      



             (test code = 417)                                        

 

             IMMATURE GRANULOCYTES-RELATIVE 1 %          0-1                    

   



             PERCENT (BEAKER) (test code =                                      

  



             2801)                                               



POCT-GLUCOSE [id number] 23:47:00





             Test Item    Value        Reference Range Interpretation Comments

 

             POC-GLUCOSE METER 359 mg/dL           H            : Notified

 RN/MD:



             ([facility]) (test code =                                        TESTED

 AT [place] [address]



             1538)                                               [facility],



                                                                 [address]:



                                                                 /Techni

christina ID



                                                                 = [id number] for



                                                                 LATHBRIDGE, ALESSIA

ICE



POCT-GLUCOSE [id number] 21:13:00





             Test Item    Value        Reference Range Interpretation Comments

 

             POC-GLUCOSE METER 315 mg/dL           H            : TESTED A

T [facility] [address]



             ([facility]) (test code =                                        [facility]

JOANIE [facility],



             1538)                                               [id number]:



                                                                 /Techni

christina ID



                                                                 = [id number] for Sm

ith,



                                                                 Nicki



POCT-GLUCOSE [id number] 21:13:00





             Test Item    Value        Reference Range Interpretation Comments

 

             POC-GLUCOSE METER 331 mg/dL           H            : TESTED A

T [facility] [address]



             (BEAKER) (test code =                                        [place],



             1538)                                               [address]:



                                                                 /Techni

christina ID



                                                                 = [id number] for OSCAR HAQUE



POCT-GLUCOSE [id number] 10:30:00





             Test Item    Value        Reference Range Interpretation Comments

 

             POC-GLUCOSE METER 341 mg/dL           H            : TESTED A

T [facility] [address]



             (BEAKER) (test code =                                        [facility] [facility],



             1538)                                               [address]:



                                                                 /Techni

christina ID



                                                                 = [id number] for Nicki Arciniega



CBC W/PLT COUNT &amp; AUTO [id number] 08:48:00





             Test Item    Value        Reference Range Interpretation Comments

 

             WHITE BLOOD CELL COUNT (BEAKER) 6.7 K/ L     3.5-10.5              

    



             (test code = 775)                                        

 

             RED BLOOD CELL COUNT (BEAKER) 5.28 M/ L    4.63-6.08               

  



             (test code = 761)                                        

 

             HEMOGLOBIN (BEAKER) (test code = 14.6 GM/DL   13.7-17.5            

     



             410)                                                

 

             HEMATOCRIT (BEAKER) (test code = 44.9 %       40.1-51.0            

     



             411)                                                

 

             MEAN CORPUSCULAR VOLUME (BEAKER) 85.0 fL      79.0-92.2            

     



             (test code = 753)                                        

 

             MEAN CORPUSCULAR HEMOGLOBIN 27.7 pg      25.7-32.2                 



             (BEAKER) (test code = 751)                                        

 

             MEAN CORPUSCULAR HEMOGLOBIN CONC 32.5 GM/DL   32.3-36.5            

     



             (BEAKER) (test code = 752)                                        

 

             RED CELL DISTRIBUTION WIDTH 15.3 %       11.6-14.4    H            



             (BEAKER) (test code = 412)                                        

 

             PLATELET COUNT (BEAKER) (test 300 K/CU MM  150-450                 

  



             code = 756)                                         

 

             MEAN PLATELET VOLUME (BEAKER) 10.4 fL      9.4-12.4                

  



             (test code = 754)                                        

 

             NUCLEATED RED BLOOD CELLS 0 /100 WBC   0-0                       



             (BEAKER) (test code = 413)                                        



(MANUAL DIFFERENTIAL)2020 08:48:00





             Test Item    Value        Reference Range Interpretation Comments

 

             NEUTROPHILS - REL (DIFF) (BEAKER) 69 %                             

      



             (test code = 1359)                                        

 

             LYMPHOCYTES - REL (DIFF) (BEAKER) 25 %                             

      



             (test code = 1360)                                        

 

             MONOCYTES - REL (DIFF) (BEAKER) 3 %                                

    



             (test code = 1361)                                        

 

             EOSINOPHILS - REL (DIFF) (BEAKER) 3 %                              

      



             (test code = 1362)                                        

 

             BASOPHILS - REL (DIFF) (BEAKER) 0 %                                

    



             (test code = 1363)                                        

 

             NEUTROPHILS - ABS (DIFF) (BEAKER) 4.62 K/ L    1.80-8.00           

      



             (test code = 1365)                                        

 

             LYMPHOCYTES - ABS (DIFF) (BEAKER) 1.68 K/ L    1.48-4.50           

      



             (test code = 1366)                                        

 

             MONOCYTES - ABS (DIFF) (BEAKER) 0.20 K/ L    0.00-1.30             

    



             (test code = 1367)                                        

 

             EOSINOPHILS - ABS (DIFF) (BEAKER) 0.20 K/ L    0.00-0.50           

      



             (test code = 1368)                                        

 

             BASOPHILS - ABS (DIFF) (BEAKER) 0.00 K/ L    0.00-0.20             

    



             (test code = 1369)                                        

 

             TOTAL COUNTED (BEAKER) (test code = 100                            

        



             1351)                                               

 

             PLT MORPHOLOGY (BEAKER) (test code Normal                          

       



             = 486)                                              

 

             RBC MORPHOLOGY (BEAKER) (test code Normal                          

       



             = 762)                                              

 

             SMUDGE CELLS (BEAKER) (test code = Present                         

       



             1371)                                               



HEMOGLOBIN [id number] 06:39:00





             Test Item    Value        Reference Range Interpretation Comments

 

             HEMOGLOBIN A1C (BEAKER) (test code = 10.5 %       4.3-6.1      H   

         



             368)                                                



LIPID [id number] 04:57:00





             Test Item    Value        Reference Range Interpretation Comments

 

             TRIGLYCERIDES (BEAKER) 1251 mg/dL                             Speci

men moderately



             (test code = 540)                                        hemolyzed

 

             CHOLESTEROL (BEAKER) 215 mg/dL                              Specime

n moderately



             (test code = 631)                                        hemolyzed

 

             HDL CHOLESTEROL 22 mg/dL                               



             (BEAKER) (test code =                                        



             976)                                                



Calculated LDL not valid if triglyceride &gt;400 mg/dLTriglyceride Reference 
Range: Low Risk &lt;150Borderline 150-199 High Risk 200-499 Very High Risk 
&gt;=500Cholesterol Reference Range: Low Risk &lt;200 Borderline 200-239 High 
Risk &gt;240HDL Cholesterol Reference Range: Low Risk &gt;=60 High Risk&lt;40LDL
Cholesterol Reference Range: Optimal &lt;100 Near Optimal 100-129 Borderline 
130-159 High 160-189 Very High &gt;=190 Specimen moderately lipemicBASIC 
METABOLIC [id number] 04:56:00





             Test Item    Value        Reference Range Interpretation Comments

 

             SODIUM (BEAKER) 137 meq/L    136-145                   



             (test code = 381)                                        

 

             POTASSIUM (BEAKER) 4.6 meq/L    3.5-5.1                   Specimen 

moderately



             (test code = 379)                                        hemolyzed

 

             CHLORIDE (BEAKER) 106 meq/L                        



             (test code = 382)                                        

 

             CO2 (BEAKER) (test 20 meq/L     22-29        L            



             code = 355)                                         

 

             BLOOD UREA NITROGEN 12 mg/dL     7-21                      



             (BEAKER) (test code                                        



             = 354)                                              

 

             CREATININE (BEAKER) 0.95 mg/dL   0.57-1.25                 Specimen

 moderately



             (test code = 358)                                        hemolyzed

 

             GLUCOSE RANDOM 398 mg/dL           H            



             (BEAKER) (test code                                        



             = 652)                                              

 

             CALCIUM (BEAKER) 8.8 mg/dL    8.4-10.2                  



             (test code = 697)                                        

 

             EGFR (BEAKER) (test 110 mL/min/1.73                           ESTIM

ATED GFR IS



             code = 1092) sq m                                   NOT AS ACCURATE

 AS



                                                                 CREATININE



                                                                 CLEARANCE IN



                                                                 PREDICTING



                                                                 GLOMERULAR



                                                                 FILTRATION RATE

.



                                                                 ESTIMATED GFR I

S



                                                                 NOT APPLICABLE 

FOR



                                                                 DIALYSIS PATIEN

TS.



POCT-GLUCOSE [id number] 21:53:00





             Test Item    Value        Reference Range Interpretation Comments

 

             POC-GLUCOSE METER > mg/dL             HH           : Notified

 RN/MD: TESTED



             (BEAKER) (test code =                                        AT [facility] [address] [facility]



             1538)                                               [facility], [address]

30:



                                                                 /Techni

christina ID =



                                                                 [id number] for ZECHARIAH TRACY



U/S, ABDOMINAL, [id number] 19:54:00Reason for exam:-&gt;Evaluation of  
shunt and for possible cyst or pseudocyst Should this be performed at the 
bedside?-&gt;YesFINAL REPORT PATIENT ID: [id number] Limited abdominal ultrasound. 
CLINICAL HISTORY: Evaluation of  shunt for possible cyst or pseudocyst. 
COMPARISON STUDY: None available. FINDINGS: Sonographic assessment of the 
abdomen was performed assessing for fluid in the region of the patient's shunts.
No fluid collections are seen. However, the study is limited by the patient's 
body habitus. CT scan would be more sensitive. Signed: Brandyn Grewal MDReport 
Verified Date/Time: 2020 19:54:10 Reading Location: [facility] C0W Consult 
Reading Room Electronically signed by: BRANDYN GREWAL M.D. on 2020 07:54 
PMPOCT-GLUCOSE [id number] 19:34:00





             Test Item    Value        Reference Range Interpretation Comments

 

             POC-GLUCOSE METER 474 mg/dL           HH           : Notified

 RN/MD:



             (BEAKER) (test code =                                        TESTED

 AT [facility] [address] 7998)                                               [facility],



                                                                 [address]:



                                                                 /Techni

christina ID



                                                                 = [id number] for AK

SENAITULONA



URINALYSIS W/ REFLEX URINE [id number] 17:48:00





             Test Item    Value        Reference Range Interpretation Comments

 

             COLOR (BEAKER) (test code = 470) Yellow                            

     

 

             CLARITY (BEAKER) (test code = Hazy                                 

  



             469)                                                

 

             SPECIFIC GRAVITY UA (BEAKER) 1.020        1.001-1.035              

 



             (test code = 468)                                        

 

             PH UA (BEAKER) (test code = 467) 6.0          5.0-8.0              

     

 

             PROTEIN UA (BEAKER) (test code = 20 mg/dL     Negative     A       

     



             464)                                                

 

             GLUCOSE UA (BEAKER) (test code = >1000 mg/dL  Negative     A       

     



             365)                                                

 

             KETONES UA (BEAKER) (test code = 40 mg/dL     Negative     A       

     



             371)                                                

 

             BILIRUBIN UA (BEAKER) (test code Negative     Negative             

     



             = 462)                                              

 

             BLOOD UA (BEAKER) (test code = Moderate     Negative     A         

   



             461)                                                

 

             NITRITE UA (BEAKER) (test code = Positive     Negative     A       

     



             465)                                                

 

             LEUKOCYTE ESTERASE UA (BEAKER) Large        Negative     A         

   



             (test code = 466)                                        

 

             UROBILINOGEN UA (BEAKER) (test 0.2 mg/dL    0.2-1.0                

   



             code = 463)                                         

 

             RBC UA (BEAKER) (test code = 519) 0 /HPF                           

      

 

             WBC UA (BEAKER) (test code = 520) 267 /HPF                         

      

 

             BACTERIA (BEAKER) (test code = Moderate                            

   



             517)                                                

 

             SOURCE(BEAKER) (test code = 2395)                                  

      



CBC W/PLT COUNT &amp; AUTO [id number] 17:38:00





             Test Item    Value        Reference Range Interpretation Comments

 

             WHITE BLOOD CELL COUNT (BEAKER) 7.8 K/ L     3.5-10.5              

    



             (test code = 775)                                        

 

             RED BLOOD CELL COUNT (BEAKER) 5.12 M/ L    4.63-6.08               

  



             (test code = 761)                                        

 

             HEMOGLOBIN (BEAKER) (test code = 14.7 GM/DL   13.7-17.5            

     



             410)                                                

 

             HEMATOCRIT (BEAKER) (test code = 43.3 %       40.1-51.0            

     



             411)                                                

 

             MEAN CORPUSCULAR VOLUME (BEAKER) 84.6 fL      79.0-92.2            

     



             (test code = 753)                                        

 

             MEAN CORPUSCULAR HEMOGLOBIN 28.7 pg      25.7-32.2                 



             (BEAKER) (test code = 751)                                        

 

             MEAN CORPUSCULAR HEMOGLOBIN CONC 33.9 GM/DL   32.3-36.5            

     



             (BEAKER) (test code = 752)                                        

 

             RED CELL DISTRIBUTION WIDTH 15.3 %       11.6-14.4    H            



             (BEAKER) (test code = 412)                                        

 

             PLATELET COUNT (BEAKER) (test 344 K/CU MM  150-450                 

  



             code = 756)                                         

 

             MEAN PLATELET VOLUME (BEAKER) 11.0 fL      9.4-12.4                

  



             (test code = 754)                                        

 

             NUCLEATED RED BLOOD CELLS 0 /100 WBC   0-0                       



             (BEAKER) (test code = 413)                                        



(CELLAVISION MANUAL DIFF)2020 17:38:00





             Test Item    Value        Reference Range Interpretation Comments

 

             NEUTROPHILS - REL 63 %                                   



             (CELLAVISION)(BEAKER) (test code                                   

     



             = 2816)                                             

 

             LYMPHOCYTES - REL 23 %                                   



             (CELLAVISION)(BEAKER) (test code                                   

     



             = 2817)                                             

 

             MONOCYTES - REL 6 %                                    



             (CELLAVISION)(BEAKER) (test code                                   

     



             = 2818)                                             

 

             EOSINOPHILS - REL 5 %                                    



             (CELLAVISION)(BEAKER) (test code                                   

     



             = 2819)                                             

 

             BASOPHILS - REL 1 %                                    



             (CELLAVISION)(BEAKER) (test code                                   

     



             = 2820)                                             

 

             BANDS - REL (CELLAVISION)(BEAKER) 2 %          0-10                

      



             (test code = 2826)                                        

 

             NEUTROPHILS - ABS 4.91 K/ul    1.78-5.38                 



             (CELLAVISION)(BEAKER) (test code                                   

     



             = 2830)                                             

 

             LYMPHOCYTES - ABS 1.79 K/ul    1.32-3.57                 



             (CELLAVISION)(BEAKER) (test code                                   

     



             = 2831)                                             

 

             MONOCYTES - ABS 0.47 K/uL    0.30-0.82                 



             (CELLAVISION)(BEAKER) (test code                                   

     



             = 2832)                                             

 

             EOSINOPHILS - ABS 0.39 K/uL    0.04-0.54                 



             (CELLAVISION)(BEAKER) (test code                                   

     



             = 2834)                                             

 

             BASOPHILS - ABS 0.08 K/uL    0.01-0.08                 



             (CELLAVISION)(BEAKER) (test code                                   

     



             = 2835)                                             

 

             BANDS - ABS (CELLAVISION)(BEAKER) 0.16 K/uL    0.00-0.80           

      



             (test code = 2840)                                        

 

             TOTAL COUNTED (BEAKER) (test code 100                              

      



             = 1351)                                             

 

             SMUDGE CELLS (BEAKER) (test code Present                           

     



             = 1371)                                             

 

             GIANT PLATELETS (BEAKER) (test Present                             

   



             code = 313)                                         

 

             ANISOCYTOSIS (BEAKER) (test code 1+ few                            

     



             = 961)                                              

 

             POIKILOCYTES (BEAKER) (test code 2+ moderate                       

     



             = 966)                                              

 

             SPHEROCYTES (BEAKER) (test code = 1+ few                           

      



             768)                                                

 

             OVALOCYTES (BEAKER) (test code = 1+ few                            

     



             477)                                                

 

             TEAR DROP CELLS (BEAKER) (test 1+ few                              

   



             code = 481)                                         

 

             ARTIFACT (CELLAVISION)(BEAKER) Present                             

   



             (test code = 3432)                                        

 

             PLATELET CONCENTRATION Adequate                               



             (CELLAVISION)(BEAKER) (test code                                   

     



             = 3438)                                             



Received comment: User comments: Slide comments:POCT-GLUCOSE [id number] 
16:27:00





             Test Item    Value        Reference Range Interpretation Comments

 

             POC-GLUCOSE METER 424 mg/dL           HH           : Notified

 RN/MD:



             (BEAKER) (test code =                                        TESTED

 AT [facility] [address]



             [address])                                               [facility],



                                                                 [id number]:



                                                                 /Techni

christina ID



                                                                 = [id number] for LONA URIOSTEGUI



CT, BRAIN, WITHOUT [id number] 15:31:00FINAL REPORT PATIENT ID: [id number] 
CT, BRAIN, WITHOUT CONTRAST CLINICAL INDICATION: Hydrocephalus COMPARISON: None 
TECHNIQUE: Noncontrast axial CT imaging of the brain and skull. DOSE REDUCTION: 
Dose modulation, iterative reconstruction, and/or weight-based adjustment of the
mA/kV was utilized to reduce the radiation dose to as low as reasonably 
achievable. FINDINGS:A total of two ventricular drainagecatheters are present. A
right transverse temporal catheter terminates across the midline just abovethe 
level of the foramen of Monro. A right parietal approach catheter terminates in 
the pineal region. Supratentorial ventricular configuration is slitlike. There 
is no herniation. The basilar cisternsare preserved. There is no identifiable 
recent infarct. 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 
partial callosal agenesis. Calvarial configuration escape of cephalic. There is 
no acute osseous abnormality. IMPRESSION: Chronic, likely congenital parenchymal
changes without recent infarct or hemorrhage. A total of two ventricular 
drainage catheters are present. Supratentorial ventricular configuration is 
slitlike and may represent over shunting. Correlation recommended. Signed:
JR Rae Robert MDReport Verified Date/Time: 2020 15:31:08 
Reading Location: [facility] C013 Neuro Reading Room Electronically signed by: 
ALONDRA RAE on 2020 03:31 PMRAD, SHUNT [id number] 
15:13:00Reason for exam:-&gt;concern for VPS malfunctionFINAL REPORT PATIENT ID:
[id number] RAD, SHUNT SERIES INDICATION: concern for VPS malfunction COMPARISON: 
None TECHNIQUE: AP and lateral radiographs of the skull, abdomen and chest were 
acquired to evaluate shunt catheter FINDINGS:An abandoned shunt catheter is 
present within the right neck. Medial to this a second shunt catheter is present
which descends along the left chest wall, terminating within the mid pelvis. 
Radiopaque portions of the catheter appear contiguous. Signed: Colby Reyes Verified Date/Time: 2020 15:13:54 Reading Location: [facility]
Radiology Reading Room Electronically signed by: COLBY REYES MD on
2020 03:13 PMPOCT-GLUCOSE [id number] 11:38:00





             Test Item    Value        Reference Range Interpretation Comments

 

             POC-GLUCOSE METER 394 mg/dL           H            : TESTED A

T [facility] [address]



             (BEAKER) (test code =                                        [place],



             [address])                                               [id number]:



                                                                 /Techni

christina ID



                                                                 = [id number] for LONA URIOSTEGUI



HEMOGLOBIN [id number] 09:15:00





             Test Item    Value        Reference Range Interpretation Comments

 

             HEMOGLOBIN A1C (BEAKER) (test code = 10.4 %       4.3-6.1      H   

         



             368)                                                



TSH/FREE T4 IF [id number] 07:54:00





             Test Item    Value        Reference Range Interpretation Comments

 

             THYROID STIMULATING HORMONE 1.40 uIU/mL  0.35-4.94                 



             (BEAKER) (test code = 772)                                        



POCT-GLUCOSE [id number] 07:48:00





             Test Item    Value        Reference Range Interpretation Comments

 

             POC-GLUCOSE METER > mg/dL             HH           : Notified

 RN/MD: TESTED



             (BEAKER) (test code =                                        AT [facility]

 [address] [facility]



             [address])                                               [place], [address]

[address]:



                                                                 /Techni

christina ID =



                                                                 [id number] for LONA GOLDSMITH



BASIC METABOLIC [id number] 07:44:00





             Test Item    Value        Reference Range Interpretation Comments

 

             SODIUM (BEAKER) 134 meq/L    136-145      L            



             (test code = 381)                                        

 

             POTASSIUM (BEAKER) 4.4 meq/L    3.5-5.1                   



             (test code = 379)                                        

 

             CHLORIDE (BEAKER) 103 meq/L                        



             (test code = 382)                                        

 

             CO2 (BEAKER) (test 20 meq/L     22-29        L            



             code = 355)                                         

 

             BLOOD UREA NITROGEN 17 mg/dL     7-21                      



             (BEAKER) (test code                                        



             = 354)                                              

 

             CREATININE (BEAKER) 1.09 mg/dL   0.57-1.25                 



             (test code = 358)                                        

 

             GLUCOSE RANDOM 464 mg/dL           HH           



             (BEAKER) (test code                                        



             = 652)                                              

 

             CALCIUM (BEAKER) 8.6 mg/dL    8.4-10.2                  



             (test code = 697)                                        

 

             EGFR (BEAKER) (test 94 mL/min/1.73                           ESTIMA

HUMA GFR IS



             code = 1092) sq m                                   NOT AS ACCURATE

 AS



                                                                 CREATININE



                                                                 CLEARANCE IN



                                                                 PREDICTING



                                                                 GLOMERULAR



                                                                 FILTRATION RATE

.



                                                                 ESTIMATED GFR I

S



                                                                 NOT APPLICABLE 

FOR



                                                                 DIALYSIS PATIEN

TS.



LIPID [id number] 07:34:00





             Test Item    Value        Reference Range Interpretation Comments

 

             TRIGLYCERIDES (BEAKER) (test code = 750 mg/dL                      

        



             540)                                                

 

             CHOLESTEROL (BEAKER) (test code = 196 mg/dL                        

      



             631)                                                

 

             HDL CHOLESTEROL (BEAKER) (test code 22 mg/dL                       

        



             = 976)                                              



Calculated LDL not valid if triglyceride &gt;400 mg/dLTriglyceride Reference 
Range: Low Risk &lt;150Borderline 150-199 High Risk 200-499 Very High Risk 
&gt;=500Cholesterol Reference Range: Low Risk &lt;200 Borderline 200-239 High 
Risk &gt;240HDL Cholesterol Reference Range: Low Risk &gt;=60 High Risk&lt;40LDL
Cholesterol Reference Range: Optimal &lt;100 Near Optimal 100-129 Borderline 
130-159 High 160-189 Very High &gt;=190POCT-GLUCOSE [id number] 00:49:00





             Test Item    Value        Reference Range Interpretation Comments

 

             POC-GLUCOSE METER 399 mg/dL           H            : TESTED A

T [facility] [address]



             (KUN) (test code =                                        [place],



             [address])                                               [id number]:



                                                                 /Techni

christina ID



                                                                 = [id number] for CLAY BATRES



RAD, FOOT, 2 VIEWS, [id number] 22:28:00Reason for exam:-&gt;osteomyletitis
FINAL REPORT PATIENT ID: [id number] TECHNIQUE: Two views each of the bilateral 
feet HISTORY: osteomyletitis. COMPARISON: None. IMPRESSION:No acute displaced 
fracture or dislocation. Joint spaces are within normal limits.Severe soft 
tissue swelling.On the right subcentimeter nonspecific calcifications adjacent 
to the heel plantar aspect. Correlate with physical exam. Severely limited exam 
due to patientbody habitus. No definite cortical irregularity.There is clinical 
concern for osteomyelitis, recommend MRI with contrast. Signed: Patricia ValeraHoosier Hot Dogs Verified Date/Time: 2020 22:28:25 Reading Location: [facility] C013 
Consult Reading Room Electronically signed by: PATRICIA VALERA DO on
2020 10:28 PMRAD, FOOT, 2 VIEWS, [id number] 22:28:00Reason for 
exam:-&gt;osteomyletitsFINAL REPORT PATIENT ID: [id number] TECHNIQUE: Two views 
each of the bilateral feet HISTORY: osteomyletitis. COMPARISON: None. 
IMPRESSION:No acute displaced fracture or dislocation. Joint spaces are within 
normal limits.Severe soft tissue swelling.On the right subcentimeter nonspecific
calcifications adjacent to the heel plantar aspect. Correlate with physical 
exam. Severely limited exam due to patientbody habitus. No definite cortical 
irregularity.There is clinical concern for osteomyelitis, recommend MRI with 
contrast. Signed: Patricia Valera [employer] GroupkayeHoosier Hot Dogs Verified Date/Time: 2020 22:28:25
Reading Location: [facility] C013 Consult Reading Room Electronically signed by: 
PATRICIA VALERA DO  10:28 PMPOCT-GLUCOSE [id number] 
21:04:00





             Test Item    Value        Reference Range Interpretation Comments

 

             POC-GLUCOSE METER 430 mg/dL           HH           : Notified

 RN/MD:



             (KUN) (test code =                                        TESTED

 AT [facility] [address]



             [address])                                               [facility]



                                                                 [id number]:



                                                                 /Techni

christina ID



                                                                 = [id number] for DEYSI ADRIAN



ERTAPENEM:SUSC:PT:ISOLATE:ORDQN:[id number] 16:48:00





             Test Item    Value        Reference Range Interpretation Comments

 

             Culture: Urine (test >100,000 CFU/mL Proteus                       

    



             code = Culture: mirabilis 10,000 -                           



             Urine)       50,000 CFU/mL Skin Jaime                           



[facility]:ISOLATE:ORDQN:[id number] 16:48:00





             Test Item    Value        Reference Range Interpretation Comments

 

             Proteus mirabilis (test Proteus mirabilis                          

 



             code = Proteus mirabilis)                                        



[facility] AND [id number] 16:48:00





             Test Item    Value        Reference Range Interpretation Comments

 

             UA Nitrite (test code Negative (18 10:48                      

     



             = UA Nitrite) AM)                                    



[facility] AND [id number] 16:48:00





             Test Item    Value        Reference Range Interpretation Comments

 

             UA Bili (test code = Negative *NA*(18                         

  



             UA Bili)     10:48 AM)                              



[facility] AND [id number] 16:48:00





             Test Item    Value        Reference Range Interpretation Comments

 

             UA Ketones (test code Negative *NA*(18                        

   



             = UA Ketones) 10:48 AM)                              



[facility] AND [id number] 16:48:00





             Test Item    Value        Reference Range Interpretation Comments

 

             UA Blood (test code = Trace *ABN*(18                          

 



             UA Blood)    10:48 AM)                              



[facility] AND [id number] 16:48:00





             Test Item    Value        Reference Range Interpretation Comments

 

             UA Urobilinogen (test code = UA 0.2          0.1-1.0               

    



             Urobilinogen)                                        



[facility] AND [id number] 16:48:00





             Test Item    Value        Reference Range Interpretation Comments

 

             UA Leuk Est (test code Large *ABN*(18                         

  



             = UA Leuk Est) 10:48 AM)                              



[facility] AND [id number] 16:48:00





             Test Item    Value        Reference Range Interpretation Comments

 

             UA Protein (test code Negative (18 10:48                      

     



             = UA Protein) AM)                                    



[facility] AND [id number] 16:48:00





             Test Item    Value        Reference Range Interpretation Comments

 

             UA Glucose (test code Negative (18 10:48                      

     



             = UA Glucose) AM)                                    



[facility] AND [id number] 16:48:00





             Test Item    Value        Reference Range Interpretation Comments

 

             UA pH (test code = UA pH) 7.0 1        5.0-8.0                   



[facility] AND [id number] 16:48:00





             Test Item    Value        Reference Range Interpretation Comments

 

             UA Spec Grav (test code = UA Spec 1.015 1                          

      



             Grav)                                               



[facility] AND [id number] 16:48:00





             Test Item    Value        Reference Range Interpretation Comments

 

             UA Color (test code = Yellow *NA*(18                          

 



             UA Color)    10:48 AM)                              



[facility] AND [id number] 16:48:00





             Test Item    Value        Reference Range Interpretation Comments

 

             UA Turbidity (test code = Clear (18 10:48                     

      



             UA Turbidity) AM)                                    



[facility] AND [id number] 16:48:00





             Test Item    Value        Reference Range Interpretation Comments

 

             UA Mucus (test code = UA Mucus) Few /LPF                           

    



[facility] AND [id number] 16:48:00





             Test Item    Value        Reference Range Interpretation Comments

 

             UA Bacteria (test code = UA Few /HPF                               



             Bacteria)                                           



[facility] [facility] [id number] 16:48:00





             Test Item    Value        Reference Range Interpretation Comments

 

             UA RBC (test code = 0-2 /HPF     See_Comment                [Automa

huma message] The



             UA RBC)                                             system which ge

nerated



                                                                 this result tra

nsmitted



                                                                 reference range

: <=2. The



                                                                 reference range

 was not



                                                                 used to interpr

et this



                                                                 result as zayda

l/abnormal.



[facility] AND [id number] 16:48:00





             Test Item    Value        Reference Range Interpretation Comments

 

             UA Sq Epi (test code = None Seen (18                          

 



             UA Sq Epi)   10:48 AM)                              



[facility] AND [id number] 16:48:00





             Test Item    Value        Reference Range Interpretation Comments

 

             UA WBC (test code = UA WBC)  /HPF                            



[facility]ORD[facility][id number] 16:48:00





             Test Item    Value        Reference Range Interpretation Comments

 

             Culture: Urine (test >100,000 CFU/mL Proteus                       

    



             code = Culture: mirabilis 10,000 -                           



             Urine)       50,000 CFU/mL Skin Jaime                           



[facility]ISOLATE:ORDQN:[id number] 16:48:00





             Test Item    Value        Reference Range Interpretation Comments

 

             Proteus mirabilis (test Proteus mirabilis                          

 



             code = Proteus mirabilis)                                        



[facility] AND [id number] 16:48:00





             Test Item    Value        Reference Range Interpretation Comments

 

             UA Nitrite (test code Negative (18 10:48                      

     



             = UA Nitrite) AM)                                    



[facility] AND [id number] 16:48:00





             Test Item    Value        Reference Range Interpretation Comments

 

             UA Bili (test code = Negative *NA*(18                         

  



             UA Bili)     10:48 AM)                              



[facility] AND [id number] 16:48:00





             Test Item    Value        Reference Range Interpretation Comments

 

             UA Ketones (test code Negative *NA*(18                        

   



             = UA Ketones) 10:48 AM)                              



[facility] AND [id number] 16:48:00





             Test Item    Value        Reference Range Interpretation Comments

 

             UA Blood (test code = Trace *ABN*(18                          

 



             UA Blood)    10:48 AM)                              



[facility] AND [id number] 16:48:00





             Test Item    Value        Reference Range Interpretation Comments

 

             UA Urobilinogen (test code = UA 0.2          0.1-1.0               

    



             Urobilinogen)                                        



[facility] AND [id number] 16:48:00





             Test Item    Value        Reference Range Interpretation Comments

 

             UA Leuk Est (test code Large *ABN*(18                         

  



             = UA Leuk Est) 10:48 AM)                              



[facility] AND [id number] 16:48:00





             Test Item    Value        Reference Range Interpretation Comments

 

             UA Protein (test code Negative (18 10:48                      

     



             = UA Protein) AM)                                    



[facility] AND [id number] 16:48:00





             Test Item    Value        Reference Range Interpretation Comments

 

             UA Glucose (test code Negative (18 10:48                      

     



             = UA Glucose) AM)                                    



[facility] AND [id number] 16:48:00





             Test Item    Value        Reference Range Interpretation Comments

 

             UA pH (test code = UA pH) 7.0 1        5.0-8.0                   



[facility] AND [id number] 16:48:00





             Test Item    Value        Reference Range Interpretation Comments

 

             UA Spec Grav (test code = UA Spec 1.015 1                          

      



             Grav)                                               



[facility] AND [id number] 16:48:00





             Test Item    Value        Reference Range Interpretation Comments

 

             UA Color (test code = Yellow *NA*(18                          

 



             UA Color)    10:48 AM)                              



[facility] AND [id number] 16:48:00





             Test Item    Value        Reference Range Interpretation Comments

 

             UA Turbidity (test code = Clear (18 10:48                     

      



             UA Turbidity) AM)                                    



[facility] AND [id number] 16:48:00





             Test Item    Value        Reference Range Interpretation Comments

 

             UA Mucus (test code = UA Mucus) Few /LPF                           

    



[facility] AND [id number] 16:48:00





             Test Item    Value        Reference Range Interpretation Comments

 

             UA Bacteria (test code = UA Few /HPF                               



             Bacteria)                                           



[facility] AND [id number] 16:48:00





             Test Item    Value        Reference Range Interpretation Comments

 

             UA RBC (test code = 0-2 /HPF     See_Comment                [Automa

huma message] The



             UA RBC)                                             system which ge

nerated



                                                                 this result tra

nsmitted



                                                                 reference range

: <=2. The



                                                                 reference range

 was not



                                                                 used to interpr

et this



                                                                 result as zayda

l/abnormal.



[facility] AND [id number] 16:48:00





             Test Item    Value        Reference Range Interpretation Comments

 

             UA Sq Epi (test code = None Seen (18                          

 



             UA Sq Epi)   10:48 AM)                              



[facility] AND [id number] 16:48:00





             Test Item    Value        Reference Range Interpretation Comments

 

             UA WBC (test code = UA WBC)  /HPF                            



[facility]ORDQN:[id number] 16:48:00





             Test Item    Value        Reference Range Interpretation Comments

 

             Culture: Urine (test >100,000 CFU/mL Proteus                       

    



             code = Culture: mirabilis 10,000 -                           



             Urine)       50,000 CFU/mL Skin Jaiem                           



[facility][id number] 16:48:00





             Test Item    Value        Reference Range Interpretation Comments

 

             Proteus mirabilis (test Proteus mirabilis                          

 



             code = Proteus mirabilis)                                        



[facility] AND [id number] 16:48:00





             Test Item    Value        Reference Range Interpretation Comments

 

             UA Nitrite (test code Negative (18 10:48                      

     



             = UA Nitrite) AM)                                    



[facility] AND [id number] 16:48:00





             Test Item    Value        Reference Range Interpretation Comments

 

             UA Bili (test code = Negative *NA*(18                         

  



             UA Bili)     10:48 AM)                              



[facility] AND [id number] 16:48:00





             Test Item    Value        Reference Range Interpretation Comments

 

             UA Ketones (test code Negative *NA*(18                        

   



             = UA Ketones) 10:48 AM)                              



[facility] AND [id number] 16:48:00





             Test Item    Value        Reference Range Interpretation Comments

 

             UA Blood (test code = Trace *ABN*(18                          

 



             UA Blood)    10:48 AM)                              



[facility] AND [id number] 16:48:00





             Test Item    Value        Reference Range Interpretation Comments

 

             UA Urobilinogen (test code = UA 0.2          0.1-1.0               

    



             Urobilinogen)                                        



[facility] AND [id number] 16:48:00





             Test Item    Value        Reference Range Interpretation Comments

 

             UA Leuk Est (test code Large *ABN*(18                         

  



             = UA Leuk Est) 10:48 AM)                              



[facility] AND [id number] 16:48:00





             Test Item    Value        Reference Range Interpretation Comments

 

             UA Protein (test code Negative (18 10:48                      

     



             = UA Protein) AM)                                    



[facility] AND [id number] 16:48:00





             Test Item    Value        Reference Range Interpretation Comments

 

             UA Glucose (test code Negative (18 10:48                      

     



             = UA Glucose) AM)                                    



[facility] AND [id number] 16:48:00





             Test Item    Value        Reference Range Interpretation Comments

 

             UA pH (test code = UA pH) 7.0 1        5.0-8.0                   



[facility] AND [id number] 16:48:00





             Test Item    Value        Reference Range Interpretation Comments

 

             UA Spec Grav (test code = UA Spec 1.015 1                          

      



             Grav)                                               



[facility] AND [id number] 16:48:00





             Test Item    Value        Reference Range Interpretation Comments

 

             UA Color (test code = Yellow *NA*(18                          

 



             UA Color)    10:48 AM)                              



[facility] AND [id number] 16:48:00





             Test Item    Value        Reference Range Interpretation Comments

 

             UA Turbidity (test code = Clear (18 10:48                     

      



             UA Turbidity) AM)                                    



[facility] AND [id number] 16:48:00





             Test Item    Value        Reference Range Interpretation Comments

 

             UA Mucus (test code = UA Mucus) Few /LPF                           

    



[facility] AND [id number] 16:48:00





             Test Item    Value        Reference Range Interpretation Comments

 

             UA Bacteria (test code = UA Few /HPF                               



             Bacteria)                                           



[facility] AND [id number] 16:48:00





             Test Item    Value        Reference Range Interpretation Comments

 

             UA RBC (test code = 0-2 /HPF     See_Comment                [Automa

huma message] The



             UA RBC)                                             system which ge

nerated



                                                                 this result tra

nsmitted



                                                                 reference range

: <=2. The



                                                                 reference range

 was not



                                                                 used to interpr

et this



                                                                 result as zayda

l/abnormal.



[facility] AND [id number] 16:48:00





             Test Item    Value        Reference Range Interpretation Comments

 

             UA Sq Epi (test code = None Seen (18                          

 



             UA Sq Epi)   10:48 AM)                              



[facility] AND [id number] 16:48:00





             Test Item    Value        Reference Range Interpretation Comments

 

             UA WBC (test code = UA WBC)  /HPF                            



[facility]ATE:ORDQN:[id number] 16:48:00





             Test Item    Value        Reference Range Interpretation Comments

 

             Culture: Urine (test >100,000 CFU/mL Proteus                       

    



             code = Culture: mirabilis 10,000 -                           



             Urine)       50,000 CFU/mL Skin Jaime                           



[facility]:ORDQN:[id number] 16:48:00





             Test Item    Value        Reference Range Interpretation Comments

 

             Proteus mirabilis (test Proteus mirabilis                          

 



             code = Proteus mirabilis)                                        



[facility] AND [id number] 16:48:00





             Test Item    Value        Reference Range Interpretation Comments

 

             UA Nitrite (test code Negative (18 10:48                      

     



             = UA Nitrite) AM)                                    



[facility] AND [id number] 16:48:00





             Test Item    Value        Reference Range Interpretation Comments

 

             UA Bili (test code = Negative *NA*(18                         

  



             UA Bili)     10:48 AM)                              



[facility] AND [id number] 16:48:00





             Test Item    Value        Reference Range Interpretation Comments

 

             UA Ketones (test code Negative *NA*(18                        

   



             = UA Ketones) 10:48 AM)                              



[facility] AND [id number] 16:48:00





             Test Item    Value        Reference Range Interpretation Comments

 

             UA Blood (test code = Trace *ABN*(18                          

 



             UA Blood)    10:48 AM)                              



[facility] AND [id number] 16:48:00





             Test Item    Value        Reference Range Interpretation Comments

 

             UA Urobilinogen (test code = UA 0.2          0.1-1.0               

    



             Urobilinogen)                                        



[facility] AND [id number] 16:48:00





             Test Item    Value        Reference Range Interpretation Comments

 

             UA Leuk Est (test code Large *ABN*(18                         

  



             = UA Leuk Est) 10:48 AM)                              



[facility] AND [id number] 16:48:00





             Test Item    Value        Reference Range Interpretation Comments

 

             UA Protein (test code Negative (18 10:48                      

     



             = UA Protein) AM)                                    



[facility] AND [id number] 16:48:00





             Test Item    Value        Reference Range Interpretation Comments

 

             UA Glucose (test code Negative (18 10:48                      

     



             = UA Glucose) AM)                                    



[facility] AND [id number] 16:48:00





             Test Item    Value        Reference Range Interpretation Comments

 

             UA pH (test code = UA pH) 7.0 1        5.0-8.0                   



[facility] AND [id number] 16:48:00





             Test Item    Value        Reference Range Interpretation Comments

 

             UA Spec Grav (test code = UA Spec 1.015 1                          

      



             Grav)                                               



[facility] AND [id number] 16:48:00





             Test Item    Value        Reference Range Interpretation Comments

 

             UA Color (test code = Yellow *NA*(18                          

 



             UA Color)    10:48 AM)                              



[facility] AND [id number] 16:48:00





             Test Item    Value        Reference Range Interpretation Comments

 

             UA Turbidity (test code = Clear (18 10:48                     

      



             UA Turbidity) AM)                                    



[facility] AND [id number] 16:48:00





             Test Item    Value        Reference Range Interpretation Comments

 

             UA Mucus (test code = UA Mucus) Few /LPF                           

    



[facility] AND [id number] 16:48:00





             Test Item    Value        Reference Range Interpretation Comments

 

             UA Bacteria (test code = UA Few /HPF                               



             Bacteria)                                           



[facility] AND [id number] 16:48:00





             Test Item    Value        Reference Range Interpretation Comments

 

             UA RBC (test code = 0-2 /HPF     See_Comment                [Automa

huma message] The



             UA RBC)                                             system which ge

nerated



                                                                 this result tra

nsmitted



                                                                 reference range

: <=2. The



                                                                 reference range

 was not



                                                                 used to interpr

et this



                                                                 result as zayda

l/abnormal.



[facility] AND [id number] 16:48:00





             Test Item    Value        Reference Range Interpretation Comments

 

             UA Sq Epi (test code = None Seen (18                          

 



             UA Sq Epi)   10:48 AM)                              



[facility] AND [id number] 16:48:00





             Test Item    Value        Reference Range Interpretation Comments

 

             UA WBC (test code = UA WBC)  /HPF                            



[facility]:ORDQN:[id number] 16:48:00





             Test Item    Value        Reference Range Interpretation Comments

 

             Culture: Urine (test >100,000 CFU/mL Proteus                       

    



             code = Culture: mirabilis 10,000 -                           



             Urine)       50,000 CFU/mL Skin Jaime                           



[facility]:ORDQN:[id number] 16:48:00





             Test Item    Value        Reference Range Interpretation Comments

 

             Proteus mirabilis (test Proteus mirabilis                          

 



             code = Proteus mirabilis)                                        



[facility] AND [id number] 16:48:00





             Test Item    Value        Reference Range Interpretation Comments

 

             UA Nitrite (test code Negative (18 10:48                      

     



             = UA Nitrite) AM)                                    



[facility] AND [id number] 16:48:00





             Test Item    Value        Reference Range Interpretation Comments

 

             UA Bili (test code = Negative *NA*(18                         

  



             UA Bili)     10:48 AM)                              



[facility] AND [id number] 16:48:00





             Test Item    Value        Reference Range Interpretation Comments

 

             UA Ketones (test code Negative *NA*(18                        

   



             = UA Ketones) 10:48 AM)                              



[facility] AND [id number] 16:48:00





             Test Item    Value        Reference Range Interpretation Comments

 

             UA Blood (test code = Trace *ABN*(18                          

 



             UA Blood)    10:48 AM)                              



[facility] AND [id number] 16:48:00





             Test Item    Value        Reference Range Interpretation Comments

 

             UA Urobilinogen (test code = UA 0.2          0.1-1.0               

    



             Urobilinogen)                                        



[facility] AND [id number] 16:48:00





             Test Item    Value        Reference Range Interpretation Comments

 

             UA Leuk Est (test code Large *ABN*(18                         

  



             = UA Leuk Est) 10:48 AM)                              



[facility] AND [id number] 16:48:00





             Test Item    Value        Reference Range Interpretation Comments

 

             UA Protein (test code Negative (18 10:48                      

     



             = UA Protein) AM)                                    



[facility] AND [id number] 16:48:00





             Test Item    Value        Reference Range Interpretation Comments

 

             UA Glucose (test code Negative (18 10:48                      

     



             = UA Glucose) AM)                                    



[facility] AND [id number] 16:48:00





             Test Item    Value        Reference Range Interpretation Comments

 

             UA pH (test code = UA pH) 7.0 1        5.0-8.0                   



[facility] AND [id number] 16:48:00





             Test Item    Value        Reference Range Interpretation Comments

 

             UA Spec Grav (test code = UA Spec 1.015 1                          

      



             Grav)                                               



[facility] AND [id number] 16:48:00





             Test Item    Value        Reference Range Interpretation Comments

 

             UA Color (test code = Yellow *NA*(18                          

 



             UA Color)    10:48 AM)                              



[facility] AND [id number] 16:48:00





             Test Item    Value        Reference Range Interpretation Comments

 

             UA Turbidity (test code = Clear (18 10:48                     

      



             UA Turbidity) AM)                                    



[facility] AND [id number] 16:48:00





             Test Item    Value        Reference Range Interpretation Comments

 

             UA Mucus (test code = UA Mucus) Few /LPF                           

    



[facility] AND [id number] 16:48:00





             Test Item    Value        Reference Range Interpretation Comments

 

             UA Bacteria (test code = UA Few /HPF                               



             Bacteria)                                           



[facility] AND [id number] 16:48:00





             Test Item    Value        Reference Range Interpretation Comments

 

             UA RBC (test code = 0-2 /HPF     See_Comment                [Automa

huma message] The



             UA RBC)                                             system which ge

nerated



                                                                 this result tra

nsmitted



                                                                 reference range

: <=2. The



                                                                 reference range

 was not



                                                                 used to interpr

et this



                                                                 result as zayda

l/abnormal.



[facility] AND [id number] 16:48:00





             Test Item    Value        Reference Range Interpretation Comments

 

             UA Sq Epi (test code = None Seen (18                          

 



             UA Sq Epi)   10:48 AM)                              



[facility] AND [id number] 16:48:00





             Test Item    Value        Reference Range Interpretation Comments

 

             UA WBC (test code = UA WBC)  /HPF                            



[facility]:ISOLATE:ORDQN:[id number] 16:48:00





             Test Item    Value        Reference Range Interpretation Comments

 

             Culture: Urine (test >100,000 CFU/mL Proteus                       

    



             code = Culture: mirabilis 10,000 -                           



             Urine)       50,000 CFU/mL Skin Jaime                           



[facility]PT:ISOLATE:ORDQN:[id number] 16:48:00





             Test Item    Value        Reference Range Interpretation Comments

 

             Proteus mirabilis (test Proteus mirabilis                          

 



             code = Proteus mirabilis)                                        



[facility] AND [id number] 16:48:00





             Test Item    Value        Reference Range Interpretation Comments

 

             UA Nitrite (test code Negative (18 10:48                      

     



             = UA Nitrite) AM)                                    



[facility] AND [id number] 16:48:00





             Test Item    Value        Reference Range Interpretation Comments

 

             UA Bili (test code = Negative *NA*(18                         

  



             UA Bili)     10:48 AM)                              



[facility] AND [id number] 16:48:00





             Test Item    Value        Reference Range Interpretation Comments

 

             UA Ketones (test code Negative *NA*(18                        

   



             = UA Ketones) 10:48 AM)                              



[facility] AND [id number] 16:48:00





             Test Item    Value        Reference Range Interpretation Comments

 

             UA Blood (test code = Trace *ABN*(18                          

 



             UA Blood)    10:48 AM)                              



[facility] AND [id number] 16:48:00





             Test Item    Value        Reference Range Interpretation Comments

 

             UA Urobilinogen (test code = UA 0.2          0.1-1.0               

    



             Urobilinogen)                                        



[facility] AND [id number] 16:48:00





             Test Item    Value        Reference Range Interpretation Comments

 

             UA Leuk Est (test code Large *ABN*(18                         

  



             = UA Leuk Est) 10:48 AM)                              



[facility] AND [id number] 16:48:00





             Test Item    Value        Reference Range Interpretation Comments

 

             UA Protein (test code Negative (18 10:48                      

     



             = UA Protein) AM)                                    



[facility] AND [id number] 16:48:00





             Test Item    Value        Reference Range Interpretation Comments

 

             UA Glucose (test code Negative (18 10:48                      

     



             = UA Glucose) AM)                                    



[facility] AND [id number] 16:48:00





             Test Item    Value        Reference Range Interpretation Comments

 

             UA pH (test code = UA pH) 7.0 1        5.0-8.0                   



[facility] AND [id number] 16:48:00





             Test Item    Value        Reference Range Interpretation Comments

 

             UA Spec Grav (test code = UA Spec 1.015 1                          

      



             Grav)                                               



[facility] AND [id number] 16:48:00





             Test Item    Value        Reference Range Interpretation Comments

 

             UA Color (test code = Yellow *NA*(18                          

 



             UA Color)    10:48 AM)                              



[facility] AND [id number] 16:48:00





             Test Item    Value        Reference Range Interpretation Comments

 

             UA Turbidity (test code = Clear (18 10:48                     

      



             UA Turbidity) AM)                                    



[facility] AND [id number] 16:48:00





             Test Item    Value        Reference Range Interpretation Comments

 

             UA Mucus (test code = UA Mucus) Few /LPF                           

    



[facility] AND [id number] 16:48:00





             Test Item    Value        Reference Range Interpretation Comments

 

             UA Bacteria (test code = UA Few /HPF                               



             Bacteria)                                           



[facility] AND [id number] 16:48:00





             Test Item    Value        Reference Range Interpretation Comments

 

             UA RBC (test code = 0-2 /HPF     See_Comment                [Automa

huma message] The



             UA RBC)                                             system which ge

nerated



                                                                 this result tra

nsmitted



                                                                 reference range

: <=2. The



                                                                 reference range

 was not



                                                                 used to interpr

et this



                                                                 result as zayda

l/abnormal.



[facility] AND [id number] 16:48:00





             Test Item    Value        Reference Range Interpretation Comments

 

             UA Sq Epi (test code = None Seen (18                          

 



             UA Sq Epi)   10:48 AM)                              



[facility] AND [id number] 16:48:00





             Test Item    Value        Reference Range Interpretation Comments

 

             UA WBC (test code = UA WBC)  /HPF                            



[facility] [id number] 11:57:00





             Test Item    Value        Reference Range Interpretation Comments

 

             Antibody Scrn (test Negative (18 5:57                         

  



             code = Antibody Scrn) AM)                                    



[facility] [id number] 11:57:00





             Test Item    Value        Reference Range Interpretation Comments

 

             ABO/Rh (test code = ABO/Rh) AB POS                                 



[id number] 11:57:00





             Test Item    Value        Reference Range Interpretation Comments

 

             PTT (test code = PTT) 33.4 s       22.9-35.8                 



[facility]vjoklhPGCHBDYTRD5462-09-98 11:57:00





             Test Item    Value        Reference Range Interpretation Comments

 

             PT (test code = PT) 13.7 s       12.0-14.7                 



[facility] BrookBgynywtMLLHSOEKID7870-56-52 11:57:00





             Test Item    Value        Reference Range Interpretation Comments

 

             INR (test code = INR) 1.05 1       0.85-1.17                 



[facility] [id number] 11:57:00





             Test Item    Value        Reference Range Interpretation Comments

 

             Antibody Scrn (test Negative (18 5:57                         

  



             code = Antibody Scrn) AM)                                    



[facility] [id number] 11:57:00





             Test Item    Value        Reference Range Interpretation Comments

 

             ABO/Rh (test code = ABO/Rh) AB POS                                 



[facility] [id number] 11:57:00





             Test Item    Value        Reference Range Interpretation Comments

 

             PTT (test code = PTT) 33.4 s       22.9-35.8                 



[facility] BrookCrtqaowYNEAUNUFZO0901-32-41 11:57:00





             Test Item    Value        Reference Range Interpretation Comments

 

             PT (test code = PT) 13.7 s       12.0-14.7                 



[facility] BrookCjxhqyjMHBZRIJTFY7411-01-30 11:57:00





             Test Item    Value        Reference Range Interpretation Comments

 

             INR (test code = INR) 1.05 1       0.85-1.17                 



[facility] [id number] 11:57:00





             Test Item    Value        Reference Range Interpretation Comments

 

             Antibody Scrn (test Negative (18 5:57                         

  



             code = Antibody Scrn) AM)                                    



[facility]  11:57:00





             Test Item    Value        Reference Range Interpretation Comments

 

             ABO/Rh (test code = ABO/Rh) AB POS                                 



[facility] BrookEnjdqfrFOQKKTVGOF2961-11-62 11:57:00





             Test Item    Value        Reference Range Interpretation Comments

 

             PTT (test code = PTT) 33.4 s       22.9-35.8                 



[facility] BrookYpvtqpeISUWDAJPWO1327-47-44 11:57:00





             Test Item    Value        Reference Range Interpretation Comments

 

             PT (test code = PT) 13.7 s       12.0-14.7                 



[facility] BrookDmsonpaWQATBSEHTN2917-62-96 11:57:00





             Test Item    Value        Reference Range Interpretation Comments

 

             INR (test code = INR) 1.05 1       0.85-1.17                 



[facility]  11:57:00





             Test Item    Value        Reference Range Interpretation Comments

 

             Antibody Scrn (test Negative (18 5:57                         

  



             code = Antibody Scrn) AM)                                    



[facility] [id number] 11:57:00





             Test Item    Value        Reference Range Interpretation Comments

 

             ABO/Rh (test code = ABO/Rh) AB POS                                 



[facility] BrookEpxcgnyOBHSFKDVCO5695-27-05 11:57:00





             Test Item    Value        Reference Range Interpretation Comments

 

             PTT (test code = PTT) 33.4 s       22.9-35.8                 



[facility] BrookUlkuxulMDBTWWRBJX9657-43-09 11:57:00





             Test Item    Value        Reference Range Interpretation Comments

 

             PT (test code = PT) 13.7 s       12.0-14.7                 



[facility] BrookTcunhazWOFZAYEQVQ4826-47-37 11:57:00





             Test Item    Value        Reference Range Interpretation Comments

 

             INR (test code = INR) 1.05 1       0.85-1.17                 



[facility] [id number] 11:57:00





             Test Item    Value        Reference Range Interpretation Comments

 

             Antibody Scrn (test Negative (18 5:57                         

  



             code = Antibody Scrn) AM)                                    



[facility] [id number] 11:57:00





             Test Item    Value        Reference Range Interpretation Comments

 

             ABO/Rh (test code = ABO/Rh) AB POS                                 



[facility] BrookEmsiebwNMQSUWCOAX6769-63-83 11:57:00





             Test Item    Value        Reference Range Interpretation Comments

 

             PTT (test code = PTT) 33.4 s       22.9-35.8                 



[facility]GaowiijWUMSDMOWZN6059-62-02 11:57:00





             Test Item    Value        Reference Range Interpretation Comments

 

             PT (test code = PT) 13.7 s       12.0-14.7                 



[facility] BrookIoqpkvfBHZDIOBNNO1898-36-25 11:57:00





             Test Item    Value        Reference Range Interpretation Comments

 

             INR (test code = INR) 1.05 1       0.85-1.17                 



[facility] [id number] 11:57:00





             Test Item    Value        Reference Range Interpretation Comments

 

             Antibody Scrn (test Negative (18 5:57                         

  



             code = Antibody Scrn) AM)                                    



[facility] [id number] 11:57:00





             Test Item    Value        Reference Range Interpretation Comments

 

             ABO/Rh (test code = ABO/Rh) AB POS                                 



[facility] BrookUwdxolqKRIYEQIJHY0520-45-45 11:57:00





             Test Item    Value        Reference Range Interpretation Comments

 

             PTT (test code = PTT) 33.4 s       22.9-35.8                 



[facility] CenterHgdfmwcPTCZDFTNUR1284-93-74 11:57:00





             Test Item    Value        Reference Range Interpretation Comments

 

             PT (test code = PT) 13.7 s       12.0-14.7                 



[facility] HospitalSyoidqxTMKAEVAOLA7952-39-88 11:57:00





             Test Item    Value        Reference Range Interpretation Comments

 

             INR (test code = INR) 1.05 1       0.85-1.17                 



[facility] [employer] [id number] 10:50:01





             Test Item    Value        Reference Range Interpretation Comments

 

             eGFR (test code = eGFR) 133                                    



[facility] [id number] 10:50:01





             Test Item    Value        Reference Range Interpretation Comments

 

             Calcium Lvl (test code = Calcium Lvl) 9.4          8.5-10.5        

          



[facility]2018-11-08 10:50:01





             Test Item    Value        Reference Range Interpretation Comments

 

             CO2 (test code = CO2) 28           24-32                     



[facility]2018-11-08 10:50:01





             Test Item    Value        Reference Range Interpretation Comments

 

             BUN (test code = BUN) 14           7-22                      



[facility]2018-11-08 10:50:01





             Test Item    Value        Reference Range Interpretation Comments

 

             Glucose Lvl (test code = Glucose Lvl) 89           70-99           

          



[facility]2018-11-08 10:50:01





             Test Item    Value        Reference Range Interpretation Comments

 

             Chloride Lvl (test code = Chloride Lvl) 104                  

            



[facility]2018-11-08 10:50:01





             Test Item    Value        Reference Range Interpretation Comments

 

             Potassium Lvl (test code = Potassium 4.2          3.5-5.1          

         



             Lvl)                                                



[facility]2018-11-08 10:50:01





             Test Item    Value        Reference Range Interpretation Comments

 

             Sodium Lvl (test code = Sodium Lvl) 137          135-145           

        



[facility]2018-11-08 10:50:01





             Test Item    Value        Reference Range Interpretation Comments

 

             Creatinine Lvl (test code = Creatinine 0.85         0.50-1.40      

           



             Lvl)                                                



[facility]2018-11-08 10:50:01





             Test Item    Value        Reference Range Interpretation Comments

 

             AGAP (test code = AGAP) 9.2          10.0-20.0                 



[facility] BrookCbipbcdNQLGQWIQBK4722-27-18 10:50:01





             Test Item    Value        Reference Range Interpretation Comments

 

             ACT (TEG) Rapid (test code = ACT (TEG) 136 s                 

           



             Rapid)                                              



[facility] BrookQhlqfbeJUTGIRLQHZ5686-27-10 10:50:01





             Test Item    Value        Reference Range Interpretation Comments

 

             Split Point Rapid (test code = Split 0.6 min                       

         



             Point Rapid)                                        



[facility] BrookOojtzbwMXWWVZQATB6137-26-80 10:50:01





             Test Item    Value        Reference Range Interpretation Comments

 

             R-time Rapid (test code = R-time 0.9 min      0.4-0.7              

     



             Rapid)                                              



[facility] BrookSyowwquXAWNQMMYSW6552-16-28 10:50:01





             Test Item    Value        Reference Range Interpretation Comments

 

             K-time Rapid (test code = K-time 1.4 min      0.6-2.3              

     



             Rapid)                                              



[facility] BrookMcrakpxPLMPHMDJTD8549-98-32 10:50:01





             Test Item    Value        Reference Range Interpretation Comments

 

             Angle Rapid (test code = Angle 71 degrees   64-80                  

   



             Rapid)                                              



[facility] BrookDrdrzfcWOWXZTTXLZ1501-69-61 10:50:01





             Test Item    Value        Reference Range Interpretation Comments

 

             G-value Rapid (test code = G-value 12.7         5.0-11.6           

       



             Rapid)                                              



[facility] BrookGgjrojxUYHCYEBURP2409-34-43 10:50:01





             Test Item    Value        Reference Range Interpretation Comments

 

             Max Amplitude Rapid (test code = Max 72 mm        52-71            

         



             Amplitude Rapid)                                        



[facility] BrookJamqlsaTNQZLMBFHQ9770-92-25 10:50:01





             Test Item    Value        Reference Range Interpretation Comments

 

             Estimated % Lysis Rapid 0.1          See_Comment                [Au

tomated message] The



             (test code = Estimated                                        syste

m which generated



             % Lysis Rapid)                                        this result t

ransmitted



                                                                 reference range

: <=7.5.



                                                                 The reference r

zhen was



                                                                 not used to int

erpret



                                                                 this result as



                                                                 normal/abnormal

.



[facility] BrookWvtuvtoCXHLTXCKRJ7591-18-80 10:50:01





             Test Item    Value        Reference Range Interpretation Comments

 

             Platelet (test code = Platelet) 367          133-450               

    



[facility] BrookDminnqoPURPJFKURW0546-18-34 10:50:01





             Test Item    Value        Reference Range Interpretation Comments

 

             MPV (test code = MPV) 7.8          7.4-10.4                  



[facility] BrookEjhzosdPQMOAKFLKQ6144-28-23 10:50:01





             Test Item    Value        Reference Range Interpretation Comments

 

             MCH (test code = MCH) 27.4 pg      27.0-31.0                 



[facility] BrookWtxdszjRAQZDUUUCY1966-98-70 10:50:01





             Test Item    Value        Reference Range Interpretation Comments

 

             MCV (test code = MCV) 80.7         80.0-94.0                 



[facility] BrookJidhtdwIJHLFROPQI9453-90-60 10:50:01





             Test Item    Value        Reference Range Interpretation Comments

 

             MCHC (test code = MCHC) 34.0         32.0-36.0                 



[facility] BrookSrqwtskVFIZWPPXRG0273-35-03 10:50:01





             Test Item    Value        Reference Range Interpretation Comments

 

             RDW (test code = RDW) 18.9         11.5-14.5                 



[facility] BrookVplqpfmKNMRDBWWSG3398-62-88 10:50:01





             Test Item    Value        Reference Range Interpretation Comments

 

             Hct (test code = Hct) 43.3         42.0-54.0                 



[facility] BrookCqmpwdcOGCHBTYEYN0892-41-01 10:50:01





             Test Item    Value        Reference Range Interpretation Comments

 

             WBC (test code = WBC) 9.3          3.7-10.4                  



[facility] BrookWtdpbsdGUHTFVUPNW5571-97-14 10:50:01





             Test Item    Value        Reference Range Interpretation Comments

 

             Hgb (test code = Hgb) 14.7         14.0-18.0                 



[facility] BrookEtiwlvtOKRCGDOWDM4404-83-33 10:50:01





             Test Item    Value        Reference Range Interpretation Comments

 

             RBC (test code = RBC) 5.36         4.70-6.10                 



[facility] BrookDvmzglsBBXDXWOFCS9667-55-66 10:50:01





             Test Item    Value        Reference Range Interpretation Comments

 

             Eosinophils # (test code 0.2          See_Comment                [A

utomated message] The



             = Eosinophils #)                                        system whic

h generated



                                                                 this result tra

nsmitted



                                                                 reference range

: <=0.5.



                                                                 The reference r

zhen was



                                                                 not used to int

erpret



                                                                 this result as



                                                                 normal/abnormal

.



[facility] BrookEikqvrbFKTLKGHRQZ4947-91-61 10:50:01





             Test Item    Value        Reference Range Interpretation Comments

 

             Basophils # (test code 0.1          See_Comment                [Aut

omated message] The



             = Basophils #)                                        system which 

generated



                                                                 this result tra

nsmitted



                                                                 reference range

: <=0.2.



                                                                 The reference r

zhen was



                                                                 not used to int

erpret



                                                                 this result as



                                                                 normal/abnormal

.



[facility] BrookUopgdpbXBUDNJQZTE9630-23-18 10:50:01





             Test Item    Value        Reference Range Interpretation Comments

 

             Lymphocytes # (test code = Lymphocytes 1.8          1.0-5.5        

           



             #)                                                  



[facility]JfmkyvcCYLZEQLMZU9933[facility]:50:01





             Test Item    Value        Reference Range Interpretation Comments

 

             Monocytes # (test code 0.9          See_Comment                [Aut

omated message] The



             = Monocytes #)                                        system which 

generated



                                                                 this result tra

nsmitted



                                                                 reference range

: <=0.8.



                                                                 The reference r

zhen was



                                                                 not used to int

erpret



                                                                 this result as



                                                                 normal/abnormal

.



[facility] BrookVsxbsceKYKWWICVOF6417-12-57 10:50:01





             Test Item    Value        Reference Range Interpretation Comments

 

             Neutrophils # (test code = Neutrophils 6.3          1.5-8.1        

           



             #)                                                  



[facility] BrookLyvzacbSSPYHUPDCY8580-10-63 10:50:01





             Test Item    Value        Reference Range Interpretation Comments

 

             Eosinophils (test code = 2.0          See_Comment                [A

utomated message] The



             Eosinophils)                                        system which ge

nerated



                                                                 this result tra

nsmitted



                                                                 reference range

: <=4.0.



                                                                 The reference r

zhen was



                                                                 not used to int

erpret



                                                                 this result as



                                                                 normal/abnormal

.



[facility] BrookErxonctUYYBESXCSQ2408-97-67 10:50:01





             Test Item    Value        Reference Range Interpretation Comments

 

             Segs (test code = Segs) 67.4         45.0-75.0                 



[facility] BrookOltcfltCMDDXXRAYT7872-10-72 10:50:01





             Test Item    Value        Reference Range Interpretation Comments

 

             Lymphocytes (test code = Lymphocytes) 19.9         20.0-40.0       

          



[facility] BrookOspvcckUCXGHKFWLG7164-93-57 10:50:01





             Test Item    Value        Reference Range Interpretation Comments

 

             Basophils (test code = 1.0          See_Comment                [Aut

omated message] The



             Basophils)                                          system which ge

nerated



                                                                 this result tra

nsmitted



                                                                 reference range

: <=1.0.



                                                                 The reference r

zhen was



                                                                 not used to int

erpret



                                                                 this result as



                                                                 normal/abnormal

.



[facility] BrookNnbgrufMPYRGINVYS1955-21-19 10:50:01





             Test Item    Value        Reference Range Interpretation Comments

 

             Monocytes (test code = Monocytes) 9.7          2.0-12.0            

      



[facility]2018-11-08 10:50:01





             Test Item    Value        Reference Range Interpretation Comments

 

             eGFR (test code = eGFR) 133                                    



[facility]2018-11-08 10:50:01





             Test Item    Value        Reference Range Interpretation Comments

 

             Calcium Lvl (test code = Calcium Lvl) 9.4          8.5-10.5        

          



[facility]2018-11-08 10:50:01





             Test Item    Value        Reference Range Interpretation Comments

 

             CO2 (test code = CO2) 28           24-32                     



[facility]2018-11-08 10:50:01





             Test Item    Value        Reference Range Interpretation Comments

 

             BUN (test code = BUN) 14           7-22                      



[facility]2018-11-08 10:50:01





             Test Item    Value        Reference Range Interpretation Comments

 

             Glucose Lvl (test code = Glucose Lvl) 89           70-99           

          



[facility]2018-11-08 10:50:01





             Test Item    Value        Reference Range Interpretation Comments

 

             Chloride Lvl (test code = Chloride Lvl) 104                  

            



[facility]2018-11-08 10:50:01





             Test Item    Value        Reference Range Interpretation Comments

 

             Potassium Lvl (test code = Potassium 4.2          3.5-5.1          

         



             Lvl)                                                



[facility]2018-11-08 10:50:01





             Test Item    Value        Reference Range Interpretation Comments

 

             Sodium Lvl (test code = Sodium Lvl) 137          135-145           

        



[facility]2018-11-08 10:50:01





             Test Item    Value        Reference Range Interpretation Comments

 

             Creatinine Lvl (test code = Creatinine 0.85         0.50-1.40      

           



             Lvl)                                                



[facility]2018-11-08 10:50:01





             Test Item    Value        Reference Range Interpretation Comments

 

             AGAP (test code = AGAP) 9.2          10.0-20.0                 



[facility] BrookGfgpfpgRMURBXEKSU6969-61-42 10:50:01





             Test Item    Value        Reference Range Interpretation Comments

 

             ACT (TEG) Rapid (test code = ACT (TEG) 136 s                 

           



             Rapid)                                              



[facility] BrookJhptiduLPXUARQXLH6085-22-35 10:50:01





             Test Item    Value        Reference Range Interpretation Comments

 

             Split Point Rapid (test code = Split 0.6 min                       

         



             Point Rapid)                                        



[facility] BrookUkqxdstKQNOHOCUOF0985-29-39 10:50:01





             Test Item    Value        Reference Range Interpretation Comments

 

             R-time Rapid (test code = R-time 0.9 min      0.4-0.7              

     



             Rapid)                                              



[facility] BrookYcpymuhQXMAXLNQLO7465-69-04 10:50:01





             Test Item    Value        Reference Range Interpretation Comments

 

             K-time Rapid (test code = K-time 1.4 min      0.6-2.3              

     



             Rapid)                                              



[facility] BrookHwhbdsvFTYLBDGUXJ6180-21-35 10:50:01





             Test Item    Value        Reference Range Interpretation Comments

 

             Angle Rapid (test code = Angle 71 degrees   64-80                  

   



             Rapid)                                              



[facility] BrookPcrqfbhNIZZZZRDEJ6414-82-28 10:50:01





             Test Item    Value        Reference Range Interpretation Comments

 

             G-value Rapid (test code = G-value 12.7         5.0-11.6           

       



             Rapid)                                              



[facility] BrookAtzgsnjIVAZGSNRJZ0194-56-80 10:50:01





             Test Item    Value        Reference Range Interpretation Comments

 

             Max Amplitude Rapid (test code = Max 72 mm        52-71            

         



             Amplitude Rapid)                                        



[facility] BrookThszanzSBRVIAWHNI0079-94-90 10:50:01





             Test Item    Value        Reference Range Interpretation Comments

 

             Estimated % Lysis Rapid 0.1          See_Comment                [Au

tomated message] The



             (test code = Estimated                                        syste

m which generated



             % Lysis Rapid)                                        this result t

ransmitted



                                                                 reference range

: <=7.5.



                                                                 The reference r

zhen was



                                                                 not used to int

erpret



                                                                 this result as



                                                                 normal/abnormal

.



[facility] BrookNdugicrJONDTPIMMT4896-77-15 10:50:01





             Test Item    Value        Reference Range Interpretation Comments

 

             Platelet (test code = Platelet) 367          215-450               

    



[facility] BrookZlgaszcYJIYPUZZBW4607-47-54 10:50:01





             Test Item    Value        Reference Range Interpretation Comments

 

             MPV (test code = MPV) 7.8          7.4-10.4                  



[facility] BrookWojosmaARZXTENGIV0334-97-80 10:50:01





             Test Item    Value        Reference Range Interpretation Comments

 

             MCH (test code = MCH) 27.4 pg      27.0-31.0                 



[facility] BrookZoxfreqRSBBDJPTMQ9130-92-17 10:50:01





             Test Item    Value        Reference Range Interpretation Comments

 

             MCV (test code = MCV) 80.7         80.0-94.0                 



[facility] BrookQqytnqpOHEZNOXJPY3360-43-92 10:50:01





             Test Item    Value        Reference Range Interpretation Comments

 

             MCHC (test code = MCHC) 34.0         32.0-36.0                 



[facility] BrookOndkgbgGMMARGERZJ3719-79-38 10:50:01





             Test Item    Value        Reference Range Interpretation Comments

 

             RDW (test code = RDW) 18.9         11.5-14.5                 



[facility] BrookXglusjnDROTEFJGMA7781-79-79 10:50:01





             Test Item    Value        Reference Range Interpretation Comments

 

             Hct (test code = Hct) 43.3         42.0-54.0                 



[facility] BrookZhzckdnQKWRMUTNSZ5439-89-48 10:50:01





             Test Item    Value        Reference Range Interpretation Comments

 

             WBC (test code = WBC) 9.3          3.7-10.4                  



[facility] BrookQisvdobODKLWXJQEX1242-17-08 10:50:01





             Test Item    Value        Reference Range Interpretation Comments

 

             Hgb (test code = Hgb) 14.7         14.0-18.0                 



[facility] BrookFwcagidZGFQPQUSFU9077-30-15 10:50:01





             Test Item    Value        Reference Range Interpretation Comments

 

             RBC (test code = RBC) 5.36         4.70-6.10                 



[facility] BrookJalktelFVZMLRTDBB7563-10-87 10:50:01





             Test Item    Value        Reference Range Interpretation Comments

 

             Eosinophils # (test code 0.2          See_Comment                [A

utomated message] The



             = Eosinophils #)                                        system whic

h generated



                                                                 this result tra

nsmitted



                                                                 reference range

: <=0.5.



                                                                 The reference r

zhen was



                                                                 not used to int

erpret



                                                                 this result as



                                                                 normal/abnormal

.



[facility] BrookNrcywoePDSKXEIIPL6028-45-47 10:50:01





             Test Item    Value        Reference Range Interpretation Comments

 

             Basophils # (test code 0.1          See_Comment                [Aut

omated message] The



             = Basophils #)                                        system which 

generated



                                                                 this result tra

nsmitted



                                                                 reference range

: <=0.2.



                                                                 The reference r

zhen was



                                                                 not used to int

erpret



                                                                 this result as



                                                                 normal/abnormal

.



[facility] BrookFmpxobyBCRPADUBFA1959-74-78 10:50:01





             Test Item    Value        Reference Range Interpretation Comments

 

             Lymphocytes # (test code = Lymphocytes 1.8          1.0-5.5        

           



             #)                                                  



[facility] BrookWaotssuCWLRIOUZIN0012-06-66 10:50:01





             Test Item    Value        Reference Range Interpretation Comments

 

             Monocytes # (test code 0.9          See_Comment                [Aut

omated message] The



             = Monocytes #)                                        system which 

generated



                                                                 this result tra

nsmitted



                                                                 reference range

: <=0.8.



                                                                 The reference r

zhen was



                                                                 not used to int

erpret



                                                                 this result as



                                                                 normal/abnormal

.



[facility] BrookEimezurBAOPTWFJQR9879-95-31 10:50:01





             Test Item    Value        Reference Range Interpretation Comments

 

             Neutrophils # (test code = Neutrophils 6.3          1.5-8.1        

           



             #)                                                  



[facility] BrookKwhgwdmNEIAAEWYWH3425-51-86 10:50:01





             Test Item    Value        Reference Range Interpretation Comments

 

             Eosinophils (test code = 2.0          See_Comment                [A

utomated message] The



             Eosinophils)                                        system which ge

nerated



                                                                 this result tra

nsmitted



                                                                 reference range

: <=4.0.



                                                                 The reference r

zhen was



                                                                 not used to int

erpret



                                                                 this result as



                                                                 normal/abnormal

.



[facility] BrookYarwtixZIXQQQXVIQ8958-70-22 10:50:01





             Test Item    Value        Reference Range Interpretation Comments

 

             Segs (test code = Segs) 67.4         45.0-75.0                 



[facility] BrookLpropriIESUFBOLIC4714-86-61 10:50:01





             Test Item    Value        Reference Range Interpretation Comments

 

             Lymphocytes (test code = Lymphocytes) 19.9         20.0-40.0       

          



[facility] BrookLdyizgqSOZZHYDESY2012-51-31 10:50:01





             Test Item    Value        Reference Range Interpretation Comments

 

             Basophils (test code = 1.0          See_Comment                [Aut

omated message] The



             Basophils)                                          system which ge

nerated



                                                                 this result tra

nsmitted



                                                                 reference range

: <=1.0.



                                                                 The reference r

zhen was



                                                                 not used to int

erpret



                                                                 this result as



                                                                 normal/abnormal

.



[facility] BrookSphoncoUJDKRKJXUA9786-43-33 10:50:01





             Test Item    Value        Reference Range Interpretation Comments

 

             Monocytes (test code = Monocytes) 9.7          2.0-12.0            

      



[facility]2018-11-08 10:50:01





             Test Item    Value        Reference Range Interpretation Comments

 

             eGFR (test code = eGFR) 133                                    



[facility]2018-11-08 10:50:01





             Test Item    Value        Reference Range Interpretation Comments

 

             Calcium Lvl (test code = Calcium Lvl) 9.4          8.5-10.5        

          



[facility]2018-11-08 10:50:01





             Test Item    Value        Reference Range Interpretation Comments

 

             CO2 (test code = CO2) 28           24-32                     



[facility]2018-11-08 10:50:01





             Test Item    Value        Reference Range Interpretation Comments

 

             BUN (test code = BUN) 14           7-22                      



[facility]2018-11-08 10:50:01





             Test Item    Value        Reference Range Interpretation Comments

 

             Glucose Lvl (test code = Glucose Lvl) 89           70-99           

          



[facility]2018-11-08 10:50:01





             Test Item    Value        Reference Range Interpretation Comments

 

             Chloride Lvl (test code = Chloride Lvl) 104                  

            



[facility]2018-11-08 10:50:01





             Test Item    Value        Reference Range Interpretation Comments

 

             Potassium Lvl (test code = Potassium 4.2          3.5-5.1          

         



             Lvl)                                                



[facility]2018-11-08 10:50:01





             Test Item    Value        Reference Range Interpretation Comments

 

             Sodium Lvl (test code = Sodium Lvl) 137          135-145           

        



[facility]2018-11-08 10:50:01





             Test Item    Value        Reference Range Interpretation Comments

 

             Creatinine Lvl (test code = Creatinine 0.85         0.50-1.40      

           



             Lvl)                                                



[facility]2018-11-08 10:50:01





             Test Item    Value        Reference Range Interpretation Comments

 

             AGAP (test code = AGAP) 9.2          10.0-20.0                 



[facility] BrookGnjgtdcVJJBKMEOLF4946-45-57 10:50:01





             Test Item    Value        Reference Range Interpretation Comments

 

             ACT (TEG) Rapid (test code = ACT (TEG) 136 s                 

           



             Rapid)                                              



[facility] BrookPkbrbnzLVPXGYLCWW0044-89-35 10:50:01





             Test Item    Value        Reference Range Interpretation Comments

 

             Split Point Rapid (test code = Split 0.6 min                       

         



             Point Rapid)                                        



[facility] BrookPngttasNRQNHLYYOH3390-41-03 10:50:01





             Test Item    Value        Reference Range Interpretation Comments

 

             R-time Rapid (test code = R-time 0.9 min      0.4-0.7              

     



             Rapid)                                              



[facility] BrookGcehvszDWETJZRWIP3513-69-22 10:50:01





             Test Item    Value        Reference Range Interpretation Comments

 

             K-time Rapid (test code = K-time 1.4 min      0.6-2.3              

     



             Rapid)                                              



[facility] BrookBenmvtvQWERXZHUDE9711-01-74 10:50:01





             Test Item    Value        Reference Range Interpretation Comments

 

             Angle Rapid (test code = Angle 71 degrees   64-80                  

   



             Rapid)                                              



[facility] BrookJcqqonnIQJLHPKAHF7799-18-82 10:50:01





             Test Item    Value        Reference Range Interpretation Comments

 

             G-value Rapid (test code = G-value 12.7         5.0-11.6           

       



             Rapid)                                              



[facility] BrookEravhapLHTUIZSKWH8883-54-18 10:50:01





             Test Item    Value        Reference Range Interpretation Comments

 

             Max Amplitude Rapid (test code = Max 72 mm        52-71            

         



             Amplitude Rapid)                                        



[facility] BrookPcdqbeaVUFYBTEUFY1365-76-70 10:50:01





             Test Item    Value        Reference Range Interpretation Comments

 

             Estimated % Lysis Rapid 0.1          See_Comment                [Au

tomated message] The



             (test code = Estimated                                        syste

m which generated



             % Lysis Rapid)                                        this result t

ransmitted



                                                                 reference range

: <=7.5.



                                                                 The reference r

zhen was



                                                                 not used to int

erpret



                                                                 this result as



                                                                 normal/abnormal

.



[facility] BrookUhmxrxmVNRCEEYXUK3918-46-45 10:50:01





             Test Item    Value        Reference Range Interpretation Comments

 

             Platelet (test code = Platelet) 367          133-450               

    



[facility] BrookValpnyaMKVJFWXTFE5082-77-20 10:50:01





             Test Item    Value        Reference Range Interpretation Comments

 

             MPV (test code = MPV) 7.8          7.4-10.4                  



[facility] BrookGphgvlkDDZEBMBEYW8554-90-18 10:50:01





             Test Item    Value        Reference Range Interpretation Comments

 

             MCH (test code = MCH) 27.4 pg      27.0-31.0                 



[facility] BrookIopypjzKDKBYYOGOU4926-53-50 10:50:01





             Test Item    Value        Reference Range Interpretation Comments

 

             MCV (test code = MCV) 80.7         80.0-94.0                 



[facility] BrookNethsidGEDPHWQFOT3446-28-58 10:50:01





             Test Item    Value        Reference Range Interpretation Comments

 

             MCHC (test code = MCHC) 34.0         32.0-36.0                 



[facility] BrookRfllerhLWZBLISGQN4157-85-06 10:50:01





             Test Item    Value        Reference Range Interpretation Comments

 

             RDW (test code = RDW) 18.9         11.5-14.5                 



[facility] BrookQvaxrymGAFWLJXYHK0644-66-14 10:50:01





             Test Item    Value        Reference Range Interpretation Comments

 

             Hct (test code = Hct) 43.3         42.0-54.0                 



[facility]JunabtpGYEFQENJCY2535-87-47 [facility]:50:01





             Test Item    Value        Reference Range Interpretation Comments

 

             WBC (test code = WBC) 9.3          3.7-10.4                  



[facility] BrookRcnjehlFDSEXNBXOR8417-70-42 10:50:01





             Test Item    Value        Reference Range Interpretation Comments

 

             Hgb (test code = Hgb) 14.7         14.0-18.0                 



[facility] BrookJiwypwyEJUHZAEYIY7847-99-12 10:50:01





             Test Item    Value        Reference Range Interpretation Comments

 

             RBC (test code = RBC) 5.36         4.70-6.10                 



[facility] BrookRkficivOEMIJQICSJ0807-73-67 10:50:01





             Test Item    Value        Reference Range Interpretation Comments

 

             Eosinophils # (test code 0.2          See_Comment                [A

utomated message] The



             = Eosinophils #)                                        system whic

h generated



                                                                 this result tra

nsmitted



                                                                 reference range

: <=0.5.



                                                                 The reference r

zhen was



                                                                 not used to int

erpret



                                                                 this result as



                                                                 normal/abnormal

.



[facility] BrookZfntqshLGPGOFNBQP0682-94-93 10:50:01





             Test Item    Value        Reference Range Interpretation Comments

 

             Basophils # (test code 0.1          See_Comment                [Aut

omated message] The



             = Basophils #)                                        system which 

generated



                                                                 this result tra

nsmitted



                                                                 reference range

: <=0.2.



                                                                 The reference r

zhen was



                                                                 not used to int

erpret



                                                                 this result as



                                                                 normal/abnormal

.



[facility] BrookHbwhoxlJFVBIMAUIO4971-49-22 10:50:01





             Test Item    Value        Reference Range Interpretation Comments

 

             Lymphocytes # (test code = Lymphocytes 1.8          1.0-5.5        

           



             #)                                                  



[facility] BrookMwepvqpPBYLYCXTEF9433-85-63 10:50:01





             Test Item    Value        Reference Range Interpretation Comments

 

             Monocytes # (test code 0.9          See_Comment                [Aut

omated message] The



             = Monocytes #)                                        system which 

generated



                                                                 this result tra

nsmitted



                                                                 reference range

: <=0.8.



                                                                 The reference r

zhen was



                                                                 not used to int

erpret



                                                                 this result as



                                                                 normal/abnormal

.



[facility] BrookKsopcjsIADFNZWWBH2448-24-26 10:50:01





             Test Item    Value        Reference Range Interpretation Comments

 

             Neutrophils # (test code = Neutrophils 6.3          1.5-8.1        

           



             #)                                                  



[facility] BrookVpuvsuuUTYQXYKJCS0664-63-28 10:50:01





             Test Item    Value        Reference Range Interpretation Comments

 

             Eosinophils (test code = 2.0          See_Comment                [A

utomated message] The



             Eosinophils)                                        system which ge

nerated



                                                                 this result tra

nsmitted



                                                                 reference range

: <=4.0.



                                                                 The reference r

zhen was



                                                                 not used to int

erpret



                                                                 this result as



                                                                 normal/abnormal

.



[facility] BrookBnxmftkWBDZCCJYDN5281-88-46 10:50:01





             Test Item    Value        Reference Range Interpretation Comments

 

             Segs (test code = Segs) 67.4         45.0-75.0                 



[facility] BrookDvacchcTASTAJNGWM3040-28-02 10:50:01





             Test Item    Value        Reference Range Interpretation Comments

 

             Lymphocytes (test code = Lymphocytes) 19.9         20.0-40.0       

          



[facility] BrookUzggefaDLASFYXLNN0660-88-12 10:50:01





             Test Item    Value        Reference Range Interpretation Comments

 

             Basophils (test code = 1.0          See_Comment                [Aut

omated message] The



             Basophils)                                          system which ge

nerated



                                                                 this result tra

nsmitted



                                                                 reference range

: <=1.0.



                                                                 The reference r

zhen was



                                                                 not used to int

erpret



                                                                 this result as



                                                                 normal/abnormal

.



[facility] BrookUtxznjfFQIGFLLICA9810-45-06 10:50:01





             Test Item    Value        Reference Range Interpretation Comments

 

             Monocytes (test code = Monocytes) 9.7          2.0-12.0            

      



[facility]2018-11-08 10:50:01





             Test Item    Value        Reference Range Interpretation Comments

 

             eGFR (test code = eGFR) 133                                    



[facility]2018-11-08 10:50:01





             Test Item    Value        Reference Range Interpretation Comments

 

             Calcium Lvl (test code = Calcium Lvl) 9.4          8.5-10.5        

          



[facility]2018-11-08 10:50:01





             Test Item    Value        Reference Range Interpretation Comments

 

             CO2 (test code = CO2) 28           24-32                     



[facility]2018-11-08 10:50:01





             Test Item    Value        Reference Range Interpretation Comments

 

             BUN (test code = BUN) 14           7-22                      



[facility]2018-11-08 10:50:01





             Test Item    Value        Reference Range Interpretation Comments

 

             Glucose Lvl (test code = Glucose Lvl) 89           70-99           

          



[facility]2018-11-08 10:50:01





             Test Item    Value        Reference Range Interpretation Comments

 

             Chloride Lvl (test code = Chloride Lvl) 104                  

            



[facility]2018-11-08 10:50:01





             Test Item    Value        Reference Range Interpretation Comments

 

             Potassium Lvl (test code = Potassium 4.2          3.5-5.1          

         



             Lvl)                                                



[facility]2018-11-08 10:50:01





             Test Item    Value        Reference Range Interpretation Comments

 

             Sodium Lvl (test code = Sodium Lvl) 137          135-145           

        



[facility]2018-11-08 10:50:01





             Test Item    Value        Reference Range Interpretation Comments

 

             Creatinine Lvl (test code = Creatinine 0.85         0.50-1.40      

           



             Lvl)                                                



[facility]2018-11-08 10:50:01





             Test Item    Value        Reference Range Interpretation Comments

 

             AGAP (test code = AGAP) 9.2          10.0-20.0                 



[facility] BrookPnnrglrCLZAESWEAM0086-92-34 10:50:01





             Test Item    Value        Reference Range Interpretation Comments

 

             ACT (TEG) Rapid (test code = ACT (TEG) 136 s                 

           



             Rapid)                                              



[place] 526068-11-08 10:50:01





             Test Item    Value        Reference Range Interpretation Comments

 

             Split Point Rapid (test code = Split 0.6 min                       

         



             Point Rapid)                                        



[facility] BrookRrclbqjNNFZYHWAKK2780-97-65 10:50:01





             Test Item    Value        Reference Range Interpretation Comments

 

             R-time Rapid (test code = R-time 0.9 min      0.4-0.7              

     



             Rapid)                                              



[place] 526068-11-08 10:50:01





             Test Item    Value        Reference Range Interpretation Comments

 

             K-time Rapid (test code = K-time 1.4 min      0.6-2.3              

     



             Rapid)                                              



[facility] BrookKgvbypmBLIEJOJGMO2213-05-36 10:50:01





             Test Item    Value        Reference Range Interpretation Comments

 

             Angle Rapid (test code = Angle 71 degrees   64-80                  

   



             Rapid)                                              



[facility] BrookThciyldDIXMOPQKXW5459-61-54 10:50:01





             Test Item    Value        Reference Range Interpretation Comments

 

             G-value Rapid (test code = G-value 12.7         5.0-11.6           

       



             Rapid)                                              



[facility] BrookOfgourvKITWRWTWAL2874-81-98 10:50:01





             Test Item    Value        Reference Range Interpretation Comments

 

             Max Amplitude Rapid (test code = Max 72 mm        52-71            

         



             Amplitude Rapid)                                        



[facility] BrookAreszmvVLBQHRXWJI2313-57-61 10:50:01





             Test Item    Value        Reference Range Interpretation Comments

 

             Estimated % Lysis Rapid 0.1          See_Comment                [Au

tomated message] The



             (test code = Estimated                                        syste

m which generated



             % Lysis Rapid)                                        this result t

ransmitted



                                                                 reference range

: <=7.5.



                                                                 The reference r

zhen was



                                                                 not used to int

erpret



                                                                 this result as



                                                                 normal/abnormal

.



[facility] BrookNnlyfegQOHLJAFFPP8965-73-87 10:50:01





             Test Item    Value        Reference Range Interpretation Comments

 

             Platelet (test code = Platelet) 367          133-450               

    



[facility] BrookHaqptdgOFNNGFJDDV7714-53-35 10:50:01





             Test Item    Value        Reference Range Interpretation Comments

 

             MPV (test code = MPV) 7.8          7.4-10.4                  



[facility] BrookSuojvqvCZGWKAEEYO0911-65-67 10:50:01





             Test Item    Value        Reference Range Interpretation Comments

 

             MCH (test code = MCH) 27.4 pg      27.0-31.0                 



[facility] BrookGhjynblKXTJHKRGPX8426-68-32 10:50:01





             Test Item    Value        Reference Range Interpretation Comments

 

             MCV (test code = MCV) 80.7         80.0-94.0                 



[facility] BrookKrgwoesGKXJBEECRC5986-03-51 10:50:01





             Test Item    Value        Reference Range Interpretation Comments

 

             MCHC (test code = MCHC) 34.0         32.0-36.0                 



[facility] BrookEkscmjoHWZUMLTNRD0956-13-18 10:50:01





             Test Item    Value        Reference Range Interpretation Comments

 

             RDW (test code = RDW) 18.9         11.5-14.5                 



[facility] BrookKyyuxlwRJLUGOQLHE4061-59-18 10:50:01





             Test Item    Value        Reference Range Interpretation Comments

 

             Hct (test code = Hct) 43.3         42.0-54.0                 



[facility] BrookAesfbsyDXZOKZQTOQ4957-86-26 10:50:01





             Test Item    Value        Reference Range Interpretation Comments

 

             WBC (test code = WBC) 9.3          3.7-10.4                  



[facility] BrookAfkijimMSREIBOUOI7551-58-86 10:50:01





             Test Item    Value        Reference Range Interpretation Comments

 

             Hgb (test code = Hgb) 14.7         14.0-18.0                 



[facility] BrookUzcqeypDLRPIYZJUM7143-12-14 10:50:01





             Test Item    Value        Reference Range Interpretation Comments

 

             RBC (test code = RBC) 5.36         4.70-6.10                 



[facility] BrookJdjcijqWKJGXDDINW8217-12-41 10:50:01





             Test Item    Value        Reference Range Interpretation Comments

 

             Eosinophils # (test code 0.2          See_Comment                [A

utomated message] The



             = Eosinophils #)                                        system whic

h generated



                                                                 this result tra

nsmitted



                                                                 reference range

: <=0.5.



                                                                 The reference r

zhen was



                                                                 not used to int

erpret



                                                                 this result as



                                                                 normal/abnormal

.



[facility] BrookPsrjictMFLUANAUBF3072-32-19 10:50:01





             Test Item    Value        Reference Range Interpretation Comments

 

             Basophils # (test code 0.1          See_Comment                [Aut

omated message] The



             = Basophils #)                                        system which 

generated



                                                                 this result tra

nsmitted



                                                                 reference range

: <=0.2.



                                                                 The reference r

zhen was



                                                                 not used to int

erpret



                                                                 this result as



                                                                 normal/abnormal

.



[facility] BrookQwbcxytJQNURYVSSM5816-63-07 10:50:01





             Test Item    Value        Reference Range Interpretation Comments

 

             Lymphocytes # (test code = Lymphocytes 1.8          1.0-5.5        

           



             #)                                                  



[facility] BrookXxwxoirTMSFEIZDRB1501-60-00 10:50:01





             Test Item    Value        Reference Range Interpretation Comments

 

             Monocytes # (test code 0.9          See_Comment                [Aut

omated message] The



             = Monocytes #)                                        system which 

generated



                                                                 this result tra

nsmitted



                                                                 reference range

: <=0.8.



                                                                 The reference r

zhen was



                                                                 not used to int

erpret



                                                                 this result as



                                                                 normal/abnormal

.



[facility] BrookXbaenosCFNOPKJQWC6441-22-56 10:50:01





             Test Item    Value        Reference Range Interpretation Comments

 

             Neutrophils # (test code = Neutrophils 6.3          1.5-8.1        

           



             #)                                                  



[facility] BrookSgivkumDGYIVGZHKP9889-77-55 10:50:01





             Test Item    Value        Reference Range Interpretation Comments

 

             Eosinophils (test code = 2.0          See_Comment                [A

utomated message] The



             Eosinophils)                                        system which ge

nerated



                                                                 this result tra

nsmitted



                                                                 reference range

: <=4.0.



                                                                 The reference r

zhen was



                                                                 not used to int

erpret



                                                                 this result as



                                                                 normal/abnormal

.



[facility] BrookIuqfceqXJNMVDBHYX1713-04-10 10:50:01





             Test Item    Value        Reference Range Interpretation Comments

 

             Segs (test code = Segs) 67.4         45.0-75.0                 



[facility] BrookYusnlbrQTTHJJIEBJ6652-59-08 10:50:01





             Test Item    Value        Reference Range Interpretation Comments

 

             Lymphocytes (test code = Lymphocytes) 19.9         20.0-40.0       

          



[facility] BrookEaoaggiTTWDJUTSQR5805-60-52 10:50:01





             Test Item    Value        Reference Range Interpretation Comments

 

             Basophils (test code = 1.0          See_Comment                [Aut

omated message] The



             Basophils)                                          system which ge

nerated



                                                                 this result tra

nsmitted



                                                                 reference range

: <=1.0.



                                                                 The reference r

zhen was



                                                                 not used to int

erpret



                                                                 this result as



                                                                 normal/abnormal

.



[facility] BrookHvgtlazOUCOEGOBBD4663-65-83 10:50:01





             Test Item    Value        Reference Range Interpretation Comments

 

             Monocytes (test code = Monocytes) 9.7          2.0-12.0            

      



[facility]2018-11-08 10:50:01





             Test Item    Value        Reference Range Interpretation Comments

 

             eGFR (test code = eGFR) 133                                    



[facility]2018-11-08 10:50:01





             Test Item    Value        Reference Range Interpretation Comments

 

             Calcium Lvl (test code = Calcium Lvl) 9.4          8.5-10.5        

          



[facility]2018-11-08 10:50:01





             Test Item    Value        Reference Range Interpretation Comments

 

             CO2 (test code = CO2) 28           24-32                     



[facility]2018-11-08 10:50:01





             Test Item    Value        Reference Range Interpretation Comments

 

             BUN (test code = BUN) 14           7-22                      



[facility]2018-11-08 10:50:01





             Test Item    Value        Reference Range Interpretation Comments

 

             Glucose Lvl (test code = Glucose Lvl) 89           70-99           

          



[facility]2018-11-08 10:50:01





             Test Item    Value        Reference Range Interpretation Comments

 

             Chloride Lvl (test code = Chloride Lvl) 104                  

            



[facility]2018-11-08 10:50:01





             Test Item    Value        Reference Range Interpretation Comments

 

             Potassium Lvl (test code = Potassium 4.2          3.5-5.1          

         



             Lvl)                                                



[facility]2018-11-08 10:50:01





             Test Item    Value        Reference Range Interpretation Comments

 

             Sodium Lvl (test code = Sodium Lvl) 137          135-145           

        



[facility]2018-11-08 10:50:01





             Test Item    Value        Reference Range Interpretation Comments

 

             Creatinine Lvl (test code = Creatinine 0.85         0.50-1.40      

           



             Lvl)                                                



[facility]2018-11-08 10:50:01





             Test Item    Value        Reference Range Interpretation Comments

 

             AGAP (test code = AGAP) 9.2          10.0-20.0                 



[facility] BrookZlagulnRYFHMDGEJJ2992-20-79 10:50:01





             Test Item    Value        Reference Range Interpretation Comments

 

             ACT (TEG) Rapid (test code = ACT (TEG) 136 s                 

           



             Rapid)                                              



[facility] BrookHfdqwbyFZCUWZWPFG9025-56-69 10:50:01





             Test Item    Value        Reference Range Interpretation Comments

 

             Split Point Rapid (test code = Split 0.6 min                       

         



             Point Rapid)                                        



[facility] BrookPjmpsagTHHGLYMFYN8228-65-17 10:50:01





             Test Item    Value        Reference Range Interpretation Comments

 

             R-time Rapid (test code = R-time 0.9 min      0.4-0.7              

     



             Rapid)                                              



[facility] BrookYcnfnxxWAGTQAIKWP0005-35-85 10:50:01





             Test Item    Value        Reference Range Interpretation Comments

 

             K-time Rapid (test code = K-time 1.4 min      0.6-2.3              

     



             Rapid)                                              



[facility] BrookEufkqmbVELHQCAVFJ7310-33-41 10:50:01





             Test Item    Value        Reference Range Interpretation Comments

 

             Angle Rapid (test code = Angle 71 degrees   64-80                  

   



             Rapid)                                              



[facility] BrookGzyojhvCTDREORTMV9839-37-74 10:50:01





             Test Item    Value        Reference Range Interpretation Comments

 

             G-value Rapid (test code = G-value 12.7         5.0-11.6           

       



             Rapid)                                              



[facility] BrookMfuutwtXQUKBGYCAU7033-53-49 10:50:01





             Test Item    Value        Reference Range Interpretation Comments

 

             Max Amplitude Rapid (test code = Max 72 mm        52-71            

         



             Amplitude Rapid)                                        



[facility]FnfyxnfAFCTRRRTEC2288-11-62 10:50:01





             Test Item    Value        Reference Range Interpretation Comments

 

             Estimated % Lysis Rapid 0.1          See_Comment                [Au

tomated message] The



             (test code = Estimated                                        syste

m which generated



             % Lysis Rapid)                                        this result t

ransmitted



                                                                 reference range

: <=7.5.



                                                                 The reference r

zhen was



                                                                 not used to int

erpret



                                                                 this result as



                                                                 normal/abnormal

.



[facility] BrookRdqxydkLNHBDHRSCX1998-72-55 10:50:01





             Test Item    Value        Reference Range Interpretation Comments

 

             Platelet (test code = Platelet) 367          133-450               

    



[facility] BrookIhofnhoRLBIPRAWZH1573-70-78 10:50:01





             Test Item    Value        Reference Range Interpretation Comments

 

             MPV (test code = MPV) 7.8          7.4-10.4                  



[facility] BrookElwpxwkCAVNVOJXGE8656-55-73 10:50:01





             Test Item    Value        Reference Range Interpretation Comments

 

             MCH (test code = MCH) 27.4 pg      27.0-31.0                 



[facility] BrookNbddiniOWJXHFPUMM5618-20-15 10:50:01





             Test Item    Value        Reference Range Interpretation Comments

 

             MCV (test code = MCV) 80.7         80.0-94.0                 



[facility] BrookWyanxlzWHUFGCDSYM2416-95-71 10:50:01





             Test Item    Value        Reference Range Interpretation Comments

 

             MCHC (test code = MCHC) 34.0         32.0-36.0                 



[facility] BrookEenhcnaPQNXQNGKMF8669-72-90 10:50:01





             Test Item    Value        Reference Range Interpretation Comments

 

             RDW (test code = RDW) 18.9         11.5-14.5                 



[facility] BrookHkxrepmJMGRQYCVML0656-20-70 10:50:01





             Test Item    Value        Reference Range Interpretation Comments

 

             Hct (test code = Hct) 43.3         42.0-54.0                 



[facility] BrookPdhonegUSSAZDIWIP4900-01-22 10:50:01





             Test Item    Value        Reference Range Interpretation Comments

 

             WBC (test code = WBC) 9.3          3.7-10.4                  



[facility] BrookRnjktzaVXEJQAOOOH7773-30-18 10:50:01





             Test Item    Value        Reference Range Interpretation Comments

 

             Hgb (test code = Hgb) 14.7         14.0-18.0                 



[facility] BrookDevnaxzLQCXGBPQYA5259-80-03 10:50:01





             Test Item    Value        Reference Range Interpretation Comments

 

             RBC (test code = RBC) 5.36         4.70-6.10                 



[facility] BrookAejhlojODJDGSOQEN4396-00-84 10:50:01





             Test Item    Value        Reference Range Interpretation Comments

 

             Eosinophils # (test code 0.2          See_Comment                [A

utomated message] The



             = Eosinophils #)                                        system whic

h generated



                                                                 this result tra

nsmitted



                                                                 reference range

: <=0.5.



                                                                 The reference r

zhen was



                                                                 not used to int

erpret



                                                                 this result as



                                                                 normal/abnormal

.



[facility] BrookRkrpmwvUYPOLDAOUG4171-33-81 10:50:01





             Test Item    Value        Reference Range Interpretation Comments

 

             Basophils # (test code 0.1          See_Comment                [Aut

omated message] The



             = Basophils #)                                        system which 

generated



                                                                 this result tra

nsmitted



                                                                 reference range

: <=0.2.



                                                                 The reference r

zhen was



                                                                 not used to int

erpret



                                                                 this result as



                                                                 normal/abnormal

.



[facility] BrookLfhmkjtYMRTELRXRG2035-84-30 10:50:01





             Test Item    Value        Reference Range Interpretation Comments

 

             Lymphocytes # (test code = Lymphocytes 1.8          1.0-5.5        

           



             #)                                                  



[facility] BrookVhhxwhhSUYDPURVTU7346-08-16 10:50:01





             Test Item    Value        Reference Range Interpretation Comments

 

             Monocytes # (test code 0.9          See_Comment                [Aut

omated message] The



             = Monocytes #)                                        system which 

generated



                                                                 this result tra

nsmitted



                                                                 reference range

: <=0.8.



                                                                 The reference r

zhen was



                                                                 not used to int

erpret



                                                                 this result as



                                                                 normal/abnormal

.



[facility] BrookLovfrtlAEFVJLPRWN2009-93-70 10:50:01





             Test Item    Value        Reference Range Interpretation Comments

 

             Neutrophils # (test code = Neutrophils 6.3          1.5-8.1        

           



             #)                                                  



[facility] BrookBsnemmyUHXRXOAUFV0946-17-66 10:50:01





             Test Item    Value        Reference Range Interpretation Comments

 

             Eosinophils (test code = 2.0          See_Comment                [A

utomated message] The



             Eosinophils)                                        system which ge

nerated



                                                                 this result tra

nsmitted



                                                                 reference range

: <=4.0.



                                                                 The reference r

zhen was



                                                                 not used to int

erpret



                                                                 this result as



                                                                 normal/abnormal

.



[facility] BrookZkadocbIYZEABDEPN3927-25-95 10:50:01





             Test Item    Value        Reference Range Interpretation Comments

 

             Segs (test code = Segs) 67.4         45.0-75.0                 



[facility] BrookLeywtukQLFIZHGJRJ8116-92-79 10:50:01





             Test Item    Value        Reference Range Interpretation Comments

 

             Lymphocytes (test code = Lymphocytes) 19.9         20.0-40.0       

          



[facility] BrookQgucwfzLKTLXFZIVD5291-27-84 10:50:01





             Test Item    Value        Reference Range Interpretation Comments

 

             Basophils (test code = 1.0          See_Comment                [Aut

omated message] The



             Basophils)                                          system which ge

nerated



                                                                 this result tra

nsmitted



                                                                 reference range

: <=1.0.



                                                                 The reference r

zhen was



                                                                 not used to int

erpret



                                                                 this result as



                                                                 normal/abnormal

.



[facility] BrookOwoovysDGKFUYGBEQ2186-55-43 10:50:01





             Test Item    Value        Reference Range Interpretation Comments

 

             Monocytes (test code = Monocytes) 9.7          2.0-12.0            

      



[facility]2018-11-08 10:50:01





             Test Item    Value        Reference Range Interpretation Comments

 

             eGFR (test code = eGFR) 133                                    



[facility]2018-11-08 10:50:01





             Test Item    Value        Reference Range Interpretation Comments

 

             Calcium Lvl (test code = Calcium Lvl) 9.4          8.5-10.5        

          



[facility]2018-11-08 10:50:01





             Test Item    Value        Reference Range Interpretation Comments

 

             CO2 (test code = CO2) 28           24-32                     



[facility]2018-11-08 10:50:01





             Test Item    Value        Reference Range Interpretation Comments

 

             BUN (test code = BUN) 14           7-22                      



[facility]2018-11-08 10:50:01





             Test Item    Value        Reference Range Interpretation Comments

 

             Glucose Lvl (test code = Glucose Lvl) 89           70-99           

          



[facility]2018-11-08 10:50:01





             Test Item    Value        Reference Range Interpretation Comments

 

             Chloride Lvl (test code = Chloride Lvl) 104                  

            



[facility]2018-11-08 10:50:01





             Test Item    Value        Reference Range Interpretation Comments

 

             Potassium Lvl (test code = Potassium 4.2          3.5-5.1          

         



             Lvl)                                                



[facility]2018-11-08 10:50:01





             Test Item    Value        Reference Range Interpretation Comments

 

             Sodium Lvl (test code = Sodium Lvl) 137          135-145           

        



[facility]2018-11-08 10:50:01





             Test Item    Value        Reference Range Interpretation Comments

 

             Creatinine Lvl (test code = Creatinine 0.85         0.50-1.40      

           



             Lvl)                                                



[facility]2018-11-08 10:50:01





             Test Item    Value        Reference Range Interpretation Comments

 

             AGAP (test code = AGAP) 9.2          10.0-20.0                 



[facility] BrookXnawuyySCQVCNKVMQ7747-74-51 10:50:01





             Test Item    Value        Reference Range Interpretation Comments

 

             ACT (TEG) Rapid (test code = ACT (TEG) 136 s                 

           



             Rapid)                                              



[facility] BrookAgobbxqRMHFXWETVU4908-31-14 10:50:01





             Test Item    Value        Reference Range Interpretation Comments

 

             Split Point Rapid (test code = Split 0.6 min                       

         



             Point Rapid)                                        



[facility] BrookNcveztuDALQPPPSWF4259-62-41 10:50:01





             Test Item    Value        Reference Range Interpretation Comments

 

             R-time Rapid (test code = R-time 0.9 min      0.4-0.7              

     



             Rapid)                                              



[facility] BrookAqjehwxQMZKJEKYOD2695-02-58 10:50:01





             Test Item    Value        Reference Range Interpretation Comments

 

             K-time Rapid (test code = K-time 1.4 min      0.6-2.3              

     



             Rapid)                                              



[facility] BrookOgnrqkkBLLBIOXUWA4887-76-39 10:50:01





             Test Item    Value        Reference Range Interpretation Comments

 

             Angle Rapid (test code = Angle 71 degrees   64-80                  

   



             Rapid)                                              



[facility] BrookUiirvumZJUCXNWMKO5295-99-43 10:50:01





             Test Item    Value        Reference Range Interpretation Comments

 

             G-value Rapid (test code = G-value 12.7         5.0-11.6           

       



             Rapid)                                              



[facility] BrookBncrdkxXTCRCQYBUI3485-74-04 10:50:01





             Test Item    Value        Reference Range Interpretation Comments

 

             Max Amplitude Rapid (test code = Max 72 mm        52-71            

         



             Amplitude Rapid)                                        



[facility] BrookGnzaahmFLNSIJBNLF0801-51-66 10:50:01





             Test Item    Value        Reference Range Interpretation Comments

 

             Estimated % Lysis Rapid 0.1          See_Comment                [Au

tomated message] The



             (test code = Estimated                                        syste

m which generated



             % Lysis Rapid)                                        this result t

ransmitted



                                                                 reference range

: <=7.5.



                                                                 The reference r

zhen was



                                                                 not used to int

erpret



                                                                 this result as



                                                                 normal/abnormal

.



[facility] BrookRbbtwuhFNSELIDUAX3103-95-62 10:50:01





             Test Item    Value        Reference Range Interpretation Comments

 

             Platelet (test code = Platelet) 367          133-450               

    



[facility] BrookVtaxdnuJOUTSWKLCM1600-24-56 10:50:01





             Test Item    Value        Reference Range Interpretation Comments

 

             MPV (test code = MPV) 7.8          7.4-10.4                  



[facility] BrookYwsehmkTAKWGDKXBL5498-40-82 10:50:01





             Test Item    Value        Reference Range Interpretation Comments

 

             MCH (test code = MCH) 27.4 pg      27.0-31.0                 



[facility] BrookXwgzemzJMEHXKASJO8944-55-33 10:50:01





             Test Item    Value        Reference Range Interpretation Comments

 

             MCV (test code = MCV) 80.7         80.0-94.0                 



[facility] BrookLxmctieXTFHNTXYBP9270-98-89 10:50:01





             Test Item    Value        Reference Range Interpretation Comments

 

             MCHC (test code = MCHC) 34.0         32.0-36.0                 



[facility] BrookTjpotjdYWAALGUNTR8435-69-39 10:50:01





             Test Item    Value        Reference Range Interpretation Comments

 

             RDW (test code = RDW) 18.9         11.5-14.5                 



[facility] BrookPowvrkiMYRQFKROZE1794-46-55 10:50:01





             Test Item    Value        Reference Range Interpretation Comments

 

             Hct (test code = Hct) 43.3         42.0-54.0                 



[facility] BrookSijbqxdQMBBETLZQV2107-75-88 10:50:01





             Test Item    Value        Reference Range Interpretation Comments

 

             WBC (test code = WBC) 9.3          3.7-10.4                  



[facility] BrookAvtpimaCDWGSLOAGS7193-96-42 10:50:01





             Test Item    Value        Reference Range Interpretation Comments

 

             Hgb (test code = Hgb) 14.7         14.0-18.0                 



[facility] BrookXcrqammLRGMZPIVET1727-23-50 10:50:01





             Test Item    Value        Reference Range Interpretation Comments

 

             RBC (test code = RBC) 5.36         4.70-6.10                 



[facility] BrookTtdvakmMFFIIWFQPX9862-40-63 10:50:01





             Test Item    Value        Reference Range Interpretation Comments

 

             Eosinophils # (test code 0.2          See_Comment                [A

utomated message] The



             = Eosinophils #)                                        system whic

h generated



                                                                 this result tra

nsmitted



                                                                 reference range

: <=0.5.



                                                                 The reference r

zhen was



                                                                 not used to int

erpret



                                                                 this result as



                                                                 normal/abnormal

.



[facility] BrookLyhefhkYSJDAEEJNF7135-63-00 10:50:01





             Test Item    Value        Reference Range Interpretation Comments

 

             Basophils # (test code 0.1          See_Comment                [Aut

omated message] The



             = Basophils #)                                        system which 

generated



                                                                 this result tra

nsmitted



                                                                 reference range

: <=0.2.



                                                                 The reference r

zhen was



                                                                 not used to int

erpret



                                                                 this result as



                                                                 normal/abnormal

.



[facility] BrookRweasdnORANQNSLIR3406-33-99 10:50:01





             Test Item    Value        Reference Range Interpretation Comments

 

             Lymphocytes # (test code = Lymphocytes 1.8          1.0-5.5        

           



             #)                                                  



[facility] BrookHccswjtFVMHWPTXAL2945-69-77 10:50:01





             Test Item    Value        Reference Range Interpretation Comments

 

             Monocytes # (test code 0.9          See_Comment                [Aut

omated message] The



             = Monocytes #)                                        system which 

generated



                                                                 this result tra

nsmitted



                                                                 reference range

: <=0.8.



                                                                 The reference r

zhen was



                                                                 not used to int

erpret



                                                                 this result as



                                                                 normal/abnormal

.



[facility] BrookUljhprmFBDERKOGOY9248-97-74 10:50:01





             Test Item    Value        Reference Range Interpretation Comments

 

             Neutrophils # (test code = Neutrophils 6.3          1.5-8.1        

           



             #)                                                  



[facility] BrookAewymmpQDNQMQIZAT7870-58-60 10:50:01





             Test Item    Value        Reference Range Interpretation Comments

 

             Eosinophils (test code = 2.0          See_Comment                [A

utomated message] The



             Eosinophils)                                        system which ge

nerated



                                                                 this result tra

nsmitted



                                                                 reference range

: <=4.0.



                                                                 The reference r

zhen was



                                                                 not used to int

erpret



                                                                 this result as



                                                                 normal/abnormal

.



[facility] BrookGlashzyJNPRQAGRWR6998-30-64 10:50:01





             Test Item    Value        Reference Range Interpretation Comments

 

             Segs (test code = Segs) 67.4         45.0-75.0                 



[facility] BrookJmymlnnQNOLLXNTMS0713-73-83 10:50:01





             Test Item    Value        Reference Range Interpretation Comments

 

             Lymphocytes (test code = Lymphocytes) 19.9         20.0-40.0       

          



[facility] BrookNvdwewaIJMORAPOJL0422-00-97 10:50:01





             Test Item    Value        Reference Range Interpretation Comments

 

             Basophils (test code = 1.0          See_Comment                [Aut

omated message] The



             Basophils)                                          system which ge

nerated



                                                                 this result tra

nsmitted



                                                                 reference range

: <=1.0.



                                                                 The reference r

zhen was



                                                                 not used to int

erpret



                                                                 this result as



                                                                 normal/abnormal

.



[facility] BrookFcsbpplONGKITSYPC7812-03-73 10:50:01





             Test Item    Value        Reference Range Interpretation Comments

 

             Monocytes (test code = Monocytes) 9.7          2.0-12.0            

      



[facility]2018-05-08 05:42:00





             Test Item    Value        Reference Range Interpretation Comments

 

             B/C Ratio (test code = B/C Ratio) 17 1         6-25                

      



[facility]2018-05-08 05:42:00





             Test Item    Value        Reference Range Interpretation Comments

 

             Globulin (test code = Globulin) 4.3          2.7-4.2               

    



[facility]2018-05-08 05:42:00





             Test Item    Value        Reference Range Interpretation Comments

 

             A/G Ratio (test code = A/G Ratio) 0.7 1        0.7-1.6             

      



[facility]2018-05-08 05:42:00





             Test Item    Value        Reference Range Interpretation Comments

 

             AGAP (test code = AGAP) 14.4         10.0-20.0                 



[facility]2018-05-08 05:42:00





             Test Item    Value        Reference Range Interpretation Comments

 

             eGFR (test code = eGFR) 113                                    



[facility]2018-05-08 05:42:00





             Test Item    Value        Reference Range Interpretation Comments

 

             Alk Phos (test code = Alk Phos) 76                           

    



[facility]2018-05-08 05:42:00





             Test Item    Value        Reference Range Interpretation Comments

 

             ALT (test code = ALT) 35           See_Comment                [Auto

mated message] The



                                                                 system which ge

nerated this



                                                                 result transmit

huma



                                                                 reference range

: <=65. The



                                                                 reference range

 was not



                                                                 used to interpr

et this



                                                                 result as zayda

l/abnormal.



[facility]2018-05-08 05:42:00





             Test Item    Value        Reference Range Interpretation Comments

 

             Albumin Lvl (test code = Albumin Lvl) 2.8          3.5-5.0         

          



[facility]2018-05-08 05:42:00





             Test Item    Value        Reference Range Interpretation Comments

 

             Total Protein (test code = Total 7.1          6.4-8.4              

     



             Protein)                                            



[facility]2018-05-08 05:42:00





             Test Item    Value        Reference Range Interpretation Comments

 

             Calcium Lvl (test code = Calcium Lvl) 8.7          8.5-10.5        

          



[facility]2018-05-08 05:42:00





             Test Item    Value        Reference Range Interpretation Comments

 

             AST (test code = AST) 18           See_Comment                [Auto

mated message] The



                                                                 system which ge

nerated this



                                                                 result transmit

huma



                                                                 reference range

: <=37. The



                                                                 reference range

 was not



                                                                 used to interpr

et this



                                                                 result as zayda

l/abnormal.



[facility]2018-05-08 05:42:00





             Test Item    Value        Reference Range Interpretation Comments

 

             Bili Total (test code = Bili Total) 0.3          0.2-1.3           

        



[facility]2018-05-08 05:42:00





             Test Item    Value        Reference Range Interpretation Comments

 

             Potassium Lvl (test code = Potassium 4.4          3.5-5.1          

         



             Lvl)                                                



[facility]2018-05-08 05:42:00





             Test Item    Value        Reference Range Interpretation Comments

 

             Chloride Lvl (test code = Chloride Lvl) 109                  

            



[facility]2018-05-08 05:42:00





             Test Item    Value        Reference Range Interpretation Comments

 

             CO2 (test code = CO2) 23           24-32                     



[facility]2018-05-08 05:42:00





             Test Item    Value        Reference Range Interpretation Comments

 

             Glucose Lvl (test code = Glucose Lvl) 114          70-99           

          



[facility]2018-05-08 05:42:00





             Test Item    Value        Reference Range Interpretation Comments

 

             Creatinine Lvl (test code = Creatinine 1.01         0.50-1.40      

           



             Lvl)                                                



[facility]2018-05-08 05:42:00





             Test Item    Value        Reference Range Interpretation Comments

 

             BUN (test code = BUN) 17           7-22                      



[facility]2018-05-08 05:42:00





             Test Item    Value        Reference Range Interpretation Comments

 

             Sodium Lvl (test code = Sodium Lvl) 142          135-145           

        



[facility]





             Test Item    Value        Reference Range Interpretation Comments

 

             Basophils (test code = 0.6          See_Comment                [Aut

omated message] The



             Basophils)                                          system which ge

nerated



                                                                 this result tra

nsmitted



                                                                 reference range

: <=1.0.



                                                                 The reference r

zhen was



                                                                 not used to int

erpret



                                                                 this result as



                                                                 normal/abnormal

.



[facility]BuckpntYVIKOQQGHP9616[facility]26-[facility]





             Test Item    Value        Reference Range Interpretation Comments

 

             Segs-Bands # (test code = Segs-Bands #) 4.8          1.5-8.1       

            



[facility] BrookCwyjmftXIFICSYSBU9834-76-30 05:42:00





             Test Item    Value        Reference Range Interpretation Comments

 

             Monocytes # (test code 0.7          See_Comment                [Aut

omated message] The



             = Monocytes #)                                        system which 

generated



                                                                 this result tra

nsmitted



                                                                 reference range

: <=0.8.



                                                                 The reference r

zhen was



                                                                 not used to int

erpret



                                                                 this result as



                                                                 normal/abnormal

.



[facility] BrookZbdlsjjHMQJSNNSUD5032-62-09 05:42:00





             Test Item    Value        Reference Range Interpretation Comments

 

             Lymphocytes # (test code = Lymphocytes 1.9          1.0-5.5        

           



             #)                                                  



[facility] BrookOkrrdcvTEKNLOYNZI1078-60-27 05:42:00





             Test Item    Value        Reference Range Interpretation Comments

 

             Monocytes (test code = Monocytes) 9.4          2.0-12.0            

      



[facility] BrookBkvrqpoSCZCFLAUGU9296-31-29 05:42:00





             Test Item    Value        Reference Range Interpretation Comments

 

             Eosinophils # (test code 0.2          See_Comment                [A

utomated message] The



             = Eosinophils #)                                        system whic

h generated



                                                                 this result tra

nsmitted



                                                                 reference range

: <=0.5.



                                                                 The reference r

zhen was



                                                                 not used to int

erpret



                                                                 this result as



                                                                 normal/abnormal

.



[facility] BrookUbywwefYKPQWRNHRR3538-68-99 05:42:00





             Test Item    Value        Reference Range Interpretation Comments

 

             Eosinophils (test code = 2.9          See_Comment                [A

utomated message] The



             Eosinophils)                                        system which ge

nerated



                                                                 this result tra

nsmitted



                                                                 reference range

: <=4.0.



                                                                 The reference r

zhen was



                                                                 not used to int

erpret



                                                                 this result as



                                                                 normal/abnormal

.



[facility] BrookUqhfxhzZGJWTTZUHG5086-33-47 05:42:00





             Test Item    Value        Reference Range Interpretation Comments

 

             Segs (test code = Segs) 62.2         45.0-75.0                 



[facility] BrookAioakpzXFRZEVZZFS0239-78-05 05:42:00





             Test Item    Value        Reference Range Interpretation Comments

 

             Lymphocytes (test code = Lymphocytes) 24.9         20.0-40.0       

          



[facility] BrookPfkwekbVSDTRLEGQV7655-58-50 05:42:00





             Test Item    Value        Reference Range Interpretation Comments

 

             MCH (test code = MCH) 27.5 pg      27.0-31.0                 



[facility] BrookUsgdvdaJNDDZKJBPN3599-44-70 05:42:00





             Test Item    Value        Reference Range Interpretation Comments

 

             MCV (test code = MCV) 85.3         80.0-94.0                 



[facility] BrookAwytnduBKPWECDWTS4248-10-62 05:42:00





             Test Item    Value        Reference Range Interpretation Comments

 

             Hct (test code = Hct) 43.9         42.0-54.0                 



[facility] BrookOsudqatFYCVTZLPFE1246-00-46 05:42:00





             Test Item    Value        Reference Range Interpretation Comments

 

             Hgb (test code = Hgb) 14.2         14.0-18.0                 



[facility] BrookZftxzsbEWLVILKNSS3133-27-50 05:42:00





             Test Item    Value        Reference Range Interpretation Comments

 

             WBC (test code = WBC) 7.7          3.7-10.4                  



[facility] BrookDgaqhznJITQOTCCCP5380-78-92 05:42:00





             Test Item    Value        Reference Range Interpretation Comments

 

             RBC (test code = RBC) 5.15         4.70-6.10                 



[facility] BrookUozkvmiCBMDPKVGGD9312-92-05 05:42:00





             Test Item    Value        Reference Range Interpretation Comments

 

             MPV (test code = MPV) 8.4          7.4-10.4                  



[facility] BrookBxhrfwaYKWWCXKWIF8287-61-20 05:42:00





             Test Item    Value        Reference Range Interpretation Comments

 

             MCHC (test code = MCHC) 32.3         32.0-36.0                 



[facility] BrookHzwqukxGCZBSYJZOX9930-99-21 05:42:00





             Test Item    Value        Reference Range Interpretation Comments

 

             RDW (test code = RDW) 17.3         11.5-14.5                 



[facility] BrookQuigqvmQTXTQARSWY3336-82-64 05:42:00





             Test Item    Value        Reference Range Interpretation Comments

 

             Platelet (test code = Platelet) 317          705-450               

    



[facility] KTKCW2211-21-87 05:42:00





             Test Item    Value        Reference Range Interpretation Comments

 

             B/C Ratio (test code = B/C Ratio) 17 1         6-25                

      



[facility] SBAVY5388[facility]57[facility]





             Test Item    Value        Reference Range Interpretation Comments

 

             Globulin (test code = Globulin) 4.3          2.7-4.2               

    



[facility]2018-05-08 05:42:00





             Test Item    Value        Reference Range Interpretation Comments

 

             A/G Ratio (test code = A/G Ratio) 0.7 1        0.7-1.6             

      



[place] 892398-05-08 05:42:00





             Test Item    Value        Reference Range Interpretation Comments

 

             AGAP (test code = AGAP) 14.4         10.0-20.0                 



[facility]2018-05-08 05:42:00





             Test Item    Value        Reference Range Interpretation Comments

 

             eGFR (test code = eGFR) 113                                    



[facility]2018-05-08 05:42:00





             Test Item    Value        Reference Range Interpretation Comments

 

             Alk Phos (test code = Alk Phos) 76                           

    



[facility]2018-05-08 05:42:00





             Test Item    Value        Reference Range Interpretation Comments

 

             ALT (test code = ALT) 35           See_Comment                [Auto

mated message] The



                                                                 system which ge

nerated this



                                                                 result transmit

huma



                                                                 reference range

: <=65. The



                                                                 reference range

 was not



                                                                 used to interpr

et this



                                                                 result as zayda

l/abnormal.



[facility]2018-05-08 05:42:00





             Test Item    Value        Reference Range Interpretation Comments

 

             Albumin Lvl (test code = Albumin Lvl) 2.8          3.5-5.0         

          



[facility]2018-05-08 05:42:00





             Test Item    Value        Reference Range Interpretation Comments

 

             Total Protein (test code = Total 7.1          6.4-8.4              

     



             Protein)                                            



[facility]2018-05-08 05:42:00





             Test Item    Value        Reference Range Interpretation Comments

 

             Calcium Lvl (test code = Calcium Lvl) 8.7          8.5-10.5        

          



[facility]2018-05-08 05:42:00





             Test Item    Value        Reference Range Interpretation Comments

 

             AST (test code = AST) 18           See_Comment                [Auto

mated message] The



                                                                 system which ge

nerated this



                                                                 result transmit

huma



                                                                 reference range

: <=37. The



                                                                 reference range

 was not



                                                                 used to interpr

et this



                                                                 result as zayda

l/abnormal.



[facility]2018-05-08 05:42:00





             Test Item    Value        Reference Range Interpretation Comments

 

             Bili Total (test code = Bili Total) 0.3          0.2-1.3           

        



[facility]2018-05-08 05:42:00





             Test Item    Value        Reference Range Interpretation Comments

 

             Potassium Lvl (test code = Potassium 4.4          3.5-5.1          

         



             Lvl)                                                



[facility]2018-05-08 05:42:00





             Test Item    Value        Reference Range Interpretation Comments

 

             Chloride Lvl (test code = Chloride Lvl) 109                  

            



[facility]2018-05-08 05:42:00





             Test Item    Value        Reference Range Interpretation Comments

 

             CO2 (test code = CO2) 23           24-32                     



[facility]2018-05-08 05:42:00





             Test Item    Value        Reference Range Interpretation Comments

 

             Glucose Lvl (test code = Glucose Lvl) 114          70-99           

          



[facility]2018-05-08 05:42:00





             Test Item    Value        Reference Range Interpretation Comments

 

             Creatinine Lvl (test code = Creatinine 1.01         0.50-1.40      

           



             Lvl)                                                



[facility]2018-05-08 05:42:00





             Test Item    Value        Reference Range Interpretation Comments

 

             BUN (test code = BUN) 17           7-22                      



[facility]2018-05-08 05:42:00





             Test Item    Value        Reference Range Interpretation Comments

 

             Sodium Lvl (test code = Sodium Lvl) 142          135-145           

        



[facility] BrookOpqnpimVGWFBGITOL2215-69-06 05:42:00





             Test Item    Value        Reference Range Interpretation Comments

 

             Basophils (test code = 0.6          See_Comment                [Aut

omated message] The



             Basophils)                                          system which ge

nerated



                                                                 this result tra

nsmitted



                                                                 reference range

: <=1.0.



                                                                 The reference r

zhen was



                                                                 not used to int

erpret



                                                                 this result as



                                                                 normal/abnormal

.



[facility] BrookYpwkgxgPBMESGCEPP2010-56-63 05:42:00





             Test Item    Value        Reference Range Interpretation Comments

 

             Segs-Bands # (test code = Segs-Bands #) 4.8          1.5-8.1       

            



[facility] BrookTdpswheETTUQCIEHC2704-74-75 05:42:00





             Test Item    Value        Reference Range Interpretation Comments

 

             Monocytes # (test code 0.7          See_Comment                [Aut

omated message] The



             = Monocytes #)                                        system which 

generated



                                                                 this result tra

nsmitted



                                                                 reference range

: <=0.8.



                                                                 The reference r

zhen was



                                                                 not used to int

erpret



                                                                 this result as



                                                                 normal/abnormal

.



[facility] BrookDybrgwfSBZVMLPGVS9458-95-47 05:42:00





             Test Item    Value        Reference Range Interpretation Comments

 

             Lymphocytes # (test code = Lymphocytes 1.9          1.0-5.5        

           



             #)                                                  



[facility] BrookIrakwxdWJAXXOAIXG3928-05-56 05:42:00





             Test Item    Value        Reference Range Interpretation Comments

 

             Monocytes (test code = Monocytes) 9.4          2.0-12.0            

      



[facility] BrookAoapuykTPYYZUKYVH9941-51-97 05:42:00





             Test Item    Value        Reference Range Interpretation Comments

 

             Eosinophils # (test code 0.2          See_Comment                [A

utomated message] The



             = Eosinophils #)                                        system whic

h generated



                                                                 this result tra

nsmitted



                                                                 reference range

: <=0.5.



                                                                 The reference r

zhen was



                                                                 not used to int

erpret



                                                                 this result as



                                                                 normal/abnormal

.



[facility] BrookYbbafywJUSQHNYWQI4250-70-49 05:42:00





             Test Item    Value        Reference Range Interpretation Comments

 

             Eosinophils (test code = 2.9          See_Comment                [A

utomated message] The



             Eosinophils)                                        system which ge

nerated



                                                                 this result tra

nsmitted



                                                                 reference range

: <=4.0.



                                                                 The reference r

zhen was



                                                                 not used to int

erpret



                                                                 this result as



                                                                 normal/abnormal

.



[facility] BrookVzoezkfUPZBTMZIFH9201-83-57 05:42:00





             Test Item    Value        Reference Range Interpretation Comments

 

             Segs (test code = Segs) 62.2         45.0-75.0                 



[facility] BrookPoojietCGHKUQPUIF8310-00-77 05:42:00





             Test Item    Value        Reference Range Interpretation Comments

 

             Lymphocytes (test code = Lymphocytes) 24.9         20.0-40.0       

          



[facility] BrookFzmpdgrIQGNZKIEZP0321-26-28 05:42:00





             Test Item    Value        Reference Range Interpretation Comments

 

             MCH (test code = MCH) 27.5 pg      27.0-31.0                 



[facility] BrookHjlovgfNUUKZJABPC1556-41-21 05:42:00





             Test Item    Value        Reference Range Interpretation Comments

 

             MCV (test code = MCV) 85.3         80.0-94.0                 



[facility] BrookJjoxgjlNCQKCBBAHY1123-63-89 05:42:00





             Test Item    Value        Reference Range Interpretation Comments

 

             Hct (test code = Hct) 43.9         42.0-54.0                 



[facility] BrookJogleinJIWXFMKOFY1151-29-93 05:42:00





             Test Item    Value        Reference Range Interpretation Comments

 

             Hgb (test code = Hgb) 14.2         14.0-18.0                 



[facility] BrookQeyjgkqRFQEJBQBUT4554-05-94 05:42:00





             Test Item    Value        Reference Range Interpretation Comments

 

             WBC (test code = WBC) 7.7          3.7-10.4                  



[facility] BrookHyoyjhzICODAZBIMQ6410-82-41 05:42:00





             Test Item    Value        Reference Range Interpretation Comments

 

             RBC (test code = RBC) 5.15         4.70-6.10                 



[facility] BrookPtsunxdJSKAHUVILW7111-53-81 05:42:00





             Test Item    Value        Reference Range Interpretation Comments

 

             MPV (test code = MPV) 8.4          7.4-10.4                  



[facility] BrookRqzwqszNQYKHRWTFJ6516-50-77 05:42:00





             Test Item    Value        Reference Range Interpretation Comments

 

             MCHC (test code = MCHC) 32.3         32.0-36.0                 



[facility] BrookEjclmkzGUMWMAZWNN7414-83-00 05:42:00





             Test Item    Value        Reference Range Interpretation Comments

 

             RDW (test code = RDW) 17.3         11.5-14.5                 



[facility] BrookOhsrtbtBCKXDFZGMM1693-42-26 05:42:00





             Test Item    Value        Reference Range Interpretation Comments

 

             Platelet (test code = Platelet) 317          133-450               

    



[facility]2018-05-08 05:42:00





             Test Item    Value        Reference Range Interpretation Comments

 

             B/C Ratio (test code = B/C Ratio) 17 1         6-25                

      



[facility]2018-05-08 05:42:00





             Test Item    Value        Reference Range Interpretation Comments

 

             Globulin (test code = Globulin) 4.3          2.7-4.2               

    



[facility]2018-05-08 05:42:00





             Test Item    Value        Reference Range Interpretation Comments

 

             A/G Ratio (test code = A/G Ratio) 0.7 1        0.7-1.6             

      



[facility]2018-05-08 05:42:00





             Test Item    Value        Reference Range Interpretation Comments

 

             AGAP (test code = AGAP) 14.4         10.0-20.0                 



[facility]2018-05-08 05:42:00





             Test Item    Value        Reference Range Interpretation Comments

 

             eGFR (test code = eGFR) 113                                    



[facility]2018-05-08 05:42:00





             Test Item    Value        Reference Range Interpretation Comments

 

             Alk Phos (test code = Alk Phos) 76                           

    



[facility]2018-05-08 05:42:00





             Test Item    Value        Reference Range Interpretation Comments

 

             ALT (test code = ALT) 35           See_Comment                [Auto

mated message] The



                                                                 system which ge

nerated this



                                                                 result transmit

huma



                                                                 reference range

: <=65. The



                                                                 reference range

 was not



                                                                 used to interpr

et this



                                                                 result as zayda

l/abnormal.



[facility]2018-05-08 05:42:00





             Test Item    Value        Reference Range Interpretation Comments

 

             Albumin Lvl (test code = Albumin Lvl) 2.8          3.5-5.0         

          



[facility]2018-05-08 05:42:00





             Test Item    Value        Reference Range Interpretation Comments

 

             Total Protein (test code = Total 7.1          6.4-8.4              

     



             Protein)                                            



[facility]2018-05-08 05:42:00





             Test Item    Value        Reference Range Interpretation Comments

 

             Calcium Lvl (test code = Calcium Lvl) 8.7          8.5-10.5        

          



[facility]2018-05-08 05:42:00





             Test Item    Value        Reference Range Interpretation Comments

 

             AST (test code = AST) 18           See_Comment                [Auto

mated message] The



                                                                 system which ge

nerated this



                                                                 result transmit

huma



                                                                 reference range

: <=37. The



                                                                 reference range

 was not



                                                                 used to interpr

et this



                                                                 result as zayda

l/abnormal.



[facility]2018-05-08 05:42:00





             Test Item    Value        Reference Range Interpretation Comments

 

             Bili Total (test code = Bili Total) 0.3          0.2-1.3           

        



[facility]2018-05-08 05:42:00





             Test Item    Value        Reference Range Interpretation Comments

 

             Potassium Lvl (test code = Potassium 4.4          3.5-5.1          

         



             Lvl)                                                



[facility]2018-05-08 05:42:00





             Test Item    Value        Reference Range Interpretation Comments

 

             Chloride Lvl (test code = Chloride Lvl) 109                  

            



[facility]2018-05-08 05:42:00





             Test Item    Value        Reference Range Interpretation Comments

 

             CO2 (test code = CO2) 23           24-32                     



[facility]2018-05-08 05:42:00





             Test Item    Value        Reference Range Interpretation Comments

 

             Glucose Lvl (test code = Glucose Lvl) 114          70-99           

          



[facility]2018-05-08 05:42:00





             Test Item    Value        Reference Range Interpretation Comments

 

             Creatinine Lvl (test code = Creatinine 1.01         0.50-1.40      

           



             Lvl)                                                



[facility]2018-05-08 05:42:00





             Test Item    Value        Reference Range Interpretation Comments

 

             BUN (test code = BUN) 17           7-22                      



[facility]2018-05-08 05:42:00





             Test Item    Value        Reference Range Interpretation Comments

 

             Sodium Lvl (test code = Sodium Lvl) 142          135-145           

        



[facility]





             Test Item    Value        Reference Range Interpretation Comments

 

             Basophils (test code = 0.6          See_Comment                [Aut

omated message] The



             Basophils)                                          system which ge

nerated



                                                                 this result tra

nsmitted



                                                                 reference range

: <=1.0.



                                                                 The reference r

zhen was



                                                                 not used to int

erpret



                                                                 this result as



                                                                 normal/abnormal

.



[facility] BrookCdunireSAGALCEDEW3190-74-23 05:42:00





             Test Item    Value        Reference Range Interpretation Comments

 

             Segs-Bands # (test code = Segs-Bands #) 4.8          1.5-8.1       

            



[facility] BrookQnqhhmqYISQCRXYVT8550-13-70 05:42:00





             Test Item    Value        Reference Range Interpretation Comments

 

             Monocytes # (test code 0.7          See_Comment                [Aut

omated message] The



             = Monocytes #)                                        system which 

generated



                                                                 this result tra

nsmitted



                                                                 reference range

: <=0.8.



                                                                 The reference r

zhen was



                                                                 not used to int

erpret



                                                                 this result as



                                                                 normal/abnormal

.



[facility] BrookBcojqvyHZRIJYPPXK6028-18-95 05:42:00





             Test Item    Value        Reference Range Interpretation Comments

 

             Lymphocytes # (test code = Lymphocytes 1.9          1.0-5.5        

           



             #)                                                  



[facility] BrookVvunaieCQJQRMOWEO7086-20-19 05:42:00





             Test Item    Value        Reference Range Interpretation Comments

 

             Monocytes (test code = Monocytes) 9.4          2.0-12.0            

      



[facility] BrookGzpqsmyIKNSPKVPUK6521-40-62 05:42:00





             Test Item    Value        Reference Range Interpretation Comments

 

             Eosinophils # (test code 0.2          See_Comment                [A

utomated message] The



             = Eosinophils #)                                        system whic

h generated



                                                                 this result tra

nsmitted



                                                                 reference range

: <=0.5.



                                                                 The reference r

zhen was



                                                                 not used to int

erpret



                                                                 this result as



                                                                 normal/abnormal

.



[facility] BrookFxtgmphHUNQBZPBRL2866-08-32 05:42:00





             Test Item    Value        Reference Range Interpretation Comments

 

             Eosinophils (test code = 2.9          See_Comment                [A

utomated message] The



             Eosinophils)                                        system which ge

nerated



                                                                 this result tra

nsmitted



                                                                 reference range

: <=4.0.



                                                                 The reference r

zhen was



                                                                 not used to int

erpret



                                                                 this result as



                                                                 normal/abnormal

.



[facility]





             Test Item    Value        Reference Range Interpretation Comments

 

             Segs (test code = Segs) 62.2         45.0-75.0                 



[facility]Trg[facility]ulwMITZNWZOIC6968[facility]57[facility]





             Test Item    Value        Reference Range Interpretation Comments

 

             Lymphocytes (test code = Lymphocytes) 24.9         20.0-40.0       

          



[facility] CenterUuaesqzYNFDABGFIN3417-44-29 05:42:00





             Test Item    Value        Reference Range Interpretation Comments

 

             MCH (test code = MCH) 27.5 pg      27.0-31.0                 



[facility] CenterDtagcwwGGQFMHMSLC0983-87-41 05:42:00





             Test Item    Value        Reference Range Interpretation Comments

 

             MCV (test code = MCV) 85.3         80.0-94.0                 



[facility] CenterXldryjvXWAPEXUSMH1258-48-79 05:42:00





             Test Item    Value        Reference Range Interpretation Comments

 

             Hct (test code = Hct) 43.9         42.0-54.0                 



[facility]VcbtqdiPAOKOIHMWK9523-59-87 05:42:00





             Test Item    Value        Reference Range Interpretation Comments

 

             Hgb (test code = Hgb) 14.2         14.0-18.0                 



[facility] CenterIappmngQULEFVJZPH1838-45-48 05:42:00





             Test Item    Value        Reference Range Interpretation Comments

 

             WBC (test code = WBC) 7.7          3.7-10.4                  



[facility] CenterHuwuxkcDLAOCWXTAB2987-51-55 05:42:00





             Test Item    Value        Reference Range Interpretation Comments

 

             RBC (test code = RBC) 5.15         4.70-6.10                 



[facility] CenterKbgipbkALVPSDVHLP7943-60-69 05:42:00





             Test Item    Value        Reference Range Interpretation Comments

 

             MPV (test code = MPV) 8.4          7.4-10.4                  



[facility] CenterKlakhxdPEIXYGBEQF0302-60-93 05:42:00





             Test Item    Value        Reference Range Interpretation Comments

 

             MCHC (test code = MCHC) 32.3         32.0-36.0                 



[facility] CenterLybcetwKDYWUTRKXA0840-00-35 05:42:00





             Test Item    Value        Reference Range Interpretation Comments

 

             RDW (test code = RDW) 17.3         11.5-14.5                 



[facility] CenterOfxgkfcWZQZCYIUFJ7849-66-35 05:42:00





             Test Item    Value        Reference Range Interpretation Comments

 

             Platelet (test code = Platelet) 317          133-450               

    



[facility]2018-05-08 05:42:00





             Test Item    Value        Reference Range Interpretation Comments

 

             B/C Ratio (test code = B/C Ratio) 17 1         6-25                

      



[place] 892398-05-08 05:42:00





             Test Item    Value        Reference Range Interpretation Comments

 

             Globulin (test code = Globulin) 4.3          2.7-4.2               

    



[facility]2018-05-08 05:42:00





             Test Item    Value        Reference Range Interpretation Comments

 

             A/G Ratio (test code = A/G Ratio) 0.7 1        0.7-1.6             

      



[place] [address]-05-08 05:42:00





             Test Item    Value        Reference Range Interpretation Comments

 

             AGAP (test code = AGAP) 14.4         10.0-20.0                 



[place] 892398-05-08 05:42:00





             Test Item    Value        Reference Range Interpretation Comments

 

             eGFR (test code = eGFR) 113                                    



[place] 892398-05-08 05:42:00





             Test Item    Value        Reference Range Interpretation Comments

 

             Alk Phos (test code = Alk Phos) 76                           

    



[place] 892398-05-08 05:42:00





             Test Item    Value        Reference Range Interpretation Comments

 

             ALT (test code = ALT) 35           See_Comment                [Auto

mated message] The



                                                                 system which ge

nerated this



                                                                 result transmit

huma



                                                                 reference range

: <=65. The



                                                                 reference range

 was not



                                                                 used to interpr

et this



                                                                 result as zayda

l/abnormal.



[place] 892398-05-08 05:42:00





             Test Item    Value        Reference Range Interpretation Comments

 

             Albumin Lvl (test code = Albumin Lvl) 2.8          3.5-5.0         

          



[place] 892398-05-08 05:42:00





             Test Item    Value        Reference Range Interpretation Comments

 

             Total Protein (test code = Total 7.1          6.4-8.4              

     



             Protein)                                            



[place] 892398-05-08 05:42:00





             Test Item    Value        Reference Range Interpretation Comments

 

             Calcium Lvl (test code = Calcium Lvl) 8.7          8.5-10.5        

          



[place] 892398-05-08 05:42:00





             Test Item    Value        Reference Range Interpretation Comments

 

             AST (test code = AST) 18           See_Comment                [Auto

mated message] The



                                                                 system which ge

nerated this



                                                                 result transmit

huma



                                                                 reference range

: <=37. The



                                                                 reference range

 was not



                                                                 used to interpr

et this



                                                                 result as zayda

l/abnormal.



[facility]2018-05-08 05:42:00





             Test Item    Value        Reference Range Interpretation Comments

 

             Bili Total (test code = Bili Total) 0.3          0.2-1.3           

        



[facility]2018-05-08 05:42:00





             Test Item    Value        Reference Range Interpretation Comments

 

             Potassium Lvl (test code = Potassium 4.4          3.5-5.1          

         



             Lvl)                                                



[facility]2018-05-08 05:42:00





             Test Item    Value        Reference Range Interpretation Comments

 

             Chloride Lvl (test code = Chloride Lvl) 109                  

            



[facility]2018-05-08 05:42:00





             Test Item    Value        Reference Range Interpretation Comments

 

             CO2 (test code = CO2) 23           24-32                     



[place] 892398-05-08 05:42:00





             Test Item    Value        Reference Range Interpretation Comments

 

             Glucose Lvl (test code = Glucose Lvl) 114          70-99           

          



[facility]2018-05-08 05:42:00





             Test Item    Value        Reference Range Interpretation Comments

 

             Creatinine Lvl (test code = Creatinine 1.01         0.50-1.40      

           



             Lvl)                                                



[facility]2018-05-08 05:42:00





             Test Item    Value        Reference Range Interpretation Comments

 

             BUN (test code = BUN) 17           7-22                      



[facility]2018-05-08 05:42:00





             Test Item    Value        Reference Range Interpretation Comments

 

             Sodium Lvl (test code = Sodium Lvl) 142          135-145           

        



[facility] BrookUtztbanAJJBJGIFTS9086-81-83 05:42:00





             Test Item    Value        Reference Range Interpretation Comments

 

             Basophils (test code = 0.6          See_Comment                [Aut

omated message] The



             Basophils)                                          system which ge

nerated



                                                                 this result tra

nsmitted



                                                                 reference range

: <=1.0.



                                                                 The reference r

zhen was



                                                                 not used to int

erpret



                                                                 this result as



                                                                 normal/abnormal

.



[facility] BrookQfjurmiMTFQLUUCOT6089-31-21 05:42:00





             Test Item    Value        Reference Range Interpretation Comments

 

             Segs-Bands # (test code = Segs-Bands #) 4.8          1.5-8.1       

            



[place] 526068-05-08 05:42:00





             Test Item    Value        Reference Range Interpretation Comments

 

             Monocytes # (test code 0.7          See_Comment                [Aut

omated message] The



             = Monocytes #)                                        system which 

generated



                                                                 this result tra

nsmitted



                                                                 reference range

: <=0.8.



                                                                 The reference r

zhen was



                                                                 not used to int

erpret



                                                                 this result as



                                                                 normal/abnormal

.



[facility] BrookFpbmosuKXWAHDOJPO3692-04-74 05:42:00





             Test Item    Value        Reference Range Interpretation Comments

 

             Lymphocytes # (test code = Lymphocytes 1.9          1.0-5.5        

           



             #)                                                  



[facility] BrookZcliueaZAPOTEGIJC2205-18-61 05:42:00





             Test Item    Value        Reference Range Interpretation Comments

 

             Monocytes (test code = Monocytes) 9.4          2.0-12.0            

      



[facility] BrookSjljbhmPCSTDYVNAS0367-23-47 05:42:00





             Test Item    Value        Reference Range Interpretation Comments

 

             Eosinophils # (test code 0.2          See_Comment                [A

utomated message] The



             = Eosinophils #)                                        system whic

h generated



                                                                 this result tra

nsmitted



                                                                 reference range

: <=0.5.



                                                                 The reference r

zhen was



                                                                 not used to int

erpret



                                                                 this result as



                                                                 normal/abnormal

.



[facility] BrookIweoszgDNSBNFLGNQ7162-30-36 05:42:00





             Test Item    Value        Reference Range Interpretation Comments

 

             Eosinophils (test code = 2.9          See_Comment                [A

utomated message] The



             Eosinophils)                                        system which ge

nerated



                                                                 this result tra

nsmitted



                                                                 reference range

: <=4.0.



                                                                 The reference r

zhen was



                                                                 not used to int

erpret



                                                                 this result as



                                                                 normal/abnormal

.



[facility] BrookJezqfbvQVQSVAPRRU3993-07-00 05:42:00





             Test Item    Value        Reference Range Interpretation Comments

 

             Segs (test code = Segs) 62.2         45.0-75.0                 



[facility] BrookChpnvcmDOJZYFZLPZ3495-02-84 05:42:00





             Test Item    Value        Reference Range Interpretation Comments

 

             Lymphocytes (test code = Lymphocytes) 24.9         20.0-40.0       

          



[facility] BrookCqenoxvSQNJNLAJBH7719-39-74 05:42:00





             Test Item    Value        Reference Range Interpretation Comments

 

             MCH (test code = MCH) 27.5 pg      27.0-31.0                 



[facility] BrookSskauhrDLQDXBSZYK7384-05-10 05:42:00





             Test Item    Value        Reference Range Interpretation Comments

 

             MCV (test code = MCV) 85.3         80.0-94.0                 



[facility] BrookBnnmnhwLSSPKKWEVE9569-00-40 05:42:00





             Test Item    Value        Reference Range Interpretation Comments

 

             Hct (test code = Hct) 43.9         42.0-54.0                 



[facility] BrookIjxiqirYIMDOUDPMN4883-55-97 05:42:00





             Test Item    Value        Reference Range Interpretation Comments

 

             Hgb (test code = Hgb) 14.2         14.0-18.0                 



[facility] BrookDdeqgwtKRZRMFJUMH1129-62-48 05:42:00





             Test Item    Value        Reference Range Interpretation Comments

 

             WBC (test code = WBC) 7.7          3.7-10.4                  



[facility] BrookIiofcalHUJRGVETLT6431-92-20 05:42:00





             Test Item    Value        Reference Range Interpretation Comments

 

             RBC (test code = RBC) 5.15         4.70-6.10                 



[facility] BrookMxczkvtUPAVSUKOIP2510-83-32 05:42:00





             Test Item    Value        Reference Range Interpretation Comments

 

             MPV (test code = MPV) 8.4          7.4-10.4                  



[facility] BrookIwurjegSWELGXLLHL9490-09-52 05:42:00





             Test Item    Value        Reference Range Interpretation Comments

 

             MCHC (test code = MCHC) 32.3         32.0-36.0                 



[facility] BrookVpckakzCGWDBDYFFJ5401-52-16 05:42:00





             Test Item    Value        Reference Range Interpretation Comments

 

             RDW (test code = RDW) 17.3         11.5-14.5                 



[facility] BrookRhjvbcmKAVQXOGXJG5298-29-36 05:42:00





             Test Item    Value        Reference Range Interpretation Comments

 

             Platelet (test code = Platelet) 317          133-450               

    



[facility]2018-05-08 05:42:00





             Test Item    Value        Reference Range Interpretation Comments

 

             B/C Ratio (test code = B/C Ratio) 17 1         6-25                

      



[facility]2018-05-08 05:42:00





             Test Item    Value        Reference Range Interpretation Comments

 

             Globulin (test code = Globulin) 4.3          2.7-4.2               

    



[facility]2018-05-08 05:42:00





             Test Item    Value        Reference Range Interpretation Comments

 

             A/G Ratio (test code = A/G Ratio) 0.7 1        0.7-1.6             

      



[facility]2018-05-08 05:42:00





             Test Item    Value        Reference Range Interpretation Comments

 

             AGAP (test code = AGAP) 14.4         10.0-20.0                 



[facility]2018-05-08 05:42:00





             Test Item    Value        Reference Range Interpretation Comments

 

             eGFR (test code = eGFR) 113                                    



[facility]2018-05-08 05:42:00





             Test Item    Value        Reference Range Interpretation Comments

 

             Alk Phos (test code = Alk Phos) 76                           

    



[facility]2018-05-08 05:42:00





             Test Item    Value        Reference Range Interpretation Comments

 

             ALT (test code = ALT) 35           See_Comment                [Auto

mated message] The



                                                                 system which ge

nerated this



                                                                 result transmit

huma



                                                                 reference range

: <=65. The



                                                                 reference range

 was not



                                                                 used to interpr

et this



                                                                 result as zayda

l/abnormal.



[facility]2018-05-08 05:42:00





             Test Item    Value        Reference Range Interpretation Comments

 

             Albumin Lvl (test code = Albumin Lvl) 2.8          3.5-5.0         

          



[facility]2018-05-08 05:42:00





             Test Item    Value        Reference Range Interpretation Comments

 

             Total Protein (test code = Total 7.1          6.4-8.4              

     



             Protein)                                            



[facility]2018-05-08 05:42:00





             Test Item    Value        Reference Range Interpretation Comments

 

             Calcium Lvl (test code = Calcium Lvl) 8.7          8.5-10.5        

          



[facility]2018-05-08 05:42:00





             Test Item    Value        Reference Range Interpretation Comments

 

             AST (test code = AST) 18           See_Comment                [Auto

mated message] The



                                                                 system which ge

nerated this



                                                                 result transmit

huma



                                                                 reference range

: <=37. The



                                                                 reference range

 was not



                                                                 used to interpr

et this



                                                                 result as zayda

l/abnormal.



[facility]2018-05-08 05:42:00





             Test Item    Value        Reference Range Interpretation Comments

 

             Bili Total (test code = Bili Total) 0.3          0.2-1.3           

        



[facility]2018-05-08 05:42:00





             Test Item    Value        Reference Range Interpretation Comments

 

             Potassium Lvl (test code = Potassium 4.4          3.5-5.1          

         



             Lvl)                                                



[facility]2018-05-08 05:42:00





             Test Item    Value        Reference Range Interpretation Comments

 

             Chloride Lvl (test code = Chloride Lvl) 109                  

            



[facility]2018-05-08 05:42:00





             Test Item    Value        Reference Range Interpretation Comments

 

             CO2 (test code = CO2) 23           24-32                     



[facility]2018-05-08 05:42:00





             Test Item    Value        Reference Range Interpretation Comments

 

             Glucose Lvl (test code = Glucose Lvl) 114          70-99           

          



[facility]2018-05-08 05:42:00





             Test Item    Value        Reference Range Interpretation Comments

 

             Creatinine Lvl (test code = Creatinine 1.01         0.50-1.40      

           



             Lvl)                                                



[facility]2018-05-08 05:42:00





             Test Item    Value        Reference Range Interpretation Comments

 

             BUN (test code = BUN) 17           7-22                      



[facility]2018-05-08 05:42:00





             Test Item    Value        Reference Range Interpretation Comments

 

             Sodium Lvl (test code = Sodium Lvl) 142          135-145           

        



[facility]





             Test Item    Value        Reference Range Interpretation Comments

 

             Basophils (test code = 0.6          See_Comment                [Aut

omated message] The



             Basophils)                                          system which ge

nerated



                                                                 this result tra

nsmitted



                                                                 reference range

: <=1.0.



                                                                 The reference r

zhen was



                                                                 not used to int

erpret



                                                                 this result as



                                                                 normal/abnormal

.



[facility] BrookPaebbirTCAYERVCQT2327-75-61 05:42:00





             Test Item    Value        Reference Range Interpretation Comments

 

             Segs-Bands # (test code = Segs-Bands #) 4.8          1.5-8.1       

            



[facility] BrookDpwtsroRYIXFIWWCH5314-52-26 05:42:00





             Test Item    Value        Reference Range Interpretation Comments

 

             Monocytes # (test code 0.7          See_Comment                [Aut

omated message] The



             = Monocytes #)                                        system which 

generated



                                                                 this result tra

nsmitted



                                                                 reference range

: <=0.8.



                                                                 The reference r

zhen was



                                                                 not used to int

erpret



                                                                 this result as



                                                                 normal/abnormal

.



[facility] BrookFvyecdgRNKDLSEMXK8435-26-83 05:42:00





             Test Item    Value        Reference Range Interpretation Comments

 

             Lymphocytes # (test code = Lymphocytes 1.9          1.0-5.5        

           



             #)                                                  



[facility] BrookPlfwmdnVIRJTUIMVR1439-83-89 05:42:00





             Test Item    Value        Reference Range Interpretation Comments

 

             Monocytes (test code = Monocytes) 9.4          2.0-12.0            

      



[facility] BrookZogmdfkNHHCTEDSGZ1006-20-81 05:42:00





             Test Item    Value        Reference Range Interpretation Comments

 

             Eosinophils # (test code 0.2          See_Comment                [A

utomated message] The



             = Eosinophils #)                                        system whic

h generated



                                                                 this result tra

nsmitted



                                                                 reference range

: <=0.5.



                                                                 The reference r

zhen was



                                                                 not used to int

erpret



                                                                 this result as



                                                                 normal/abnormal

.



[facility] BrookHvdbdcwSOIDKJJCRI6198-13-03 05:42:00





             Test Item    Value        Reference Range Interpretation Comments

 

             Eosinophils (test code = 2.9          See_Comment                [A

utomated message] The



             Eosinophils)                                        system which ge

nerated



                                                                 this result tra

nsmitted



                                                                 reference range

: <=4.0.



                                                                 The reference r

zhen was



                                                                 not used to int

erpret



                                                                 this result as



                                                                 normal/abnormal

.



[facility] BrookMnvirjzWEGGDAPMYF9765-69-61 05:42:00





             Test Item    Value        Reference Range Interpretation Comments

 

             Segs (test code = Segs) 62.2         45.0-75.0                 



[facility] BrookDnyzxtoECDOCXFMKC0645-95-73 05:42:00





             Test Item    Value        Reference Range Interpretation Comments

 

             Lymphocytes (test code = Lymphocytes) 24.9         20.0-40.0       

          



[facility] BrookNdfkmmmFEERABAQUZ4666-64-23 05:42:00





             Test Item    Value        Reference Range Interpretation Comments

 

             MCH (test code = MCH) 27.5 pg      27.0-31.0                 



[facility] BrookZgeagixJPBAPXDNLD5578-59-52 05:42:00





             Test Item    Value        Reference Range Interpretation Comments

 

             MCV (test code = MCV) 85.3         80.0-94.0                 



[facility] BrookRajbrtdPFYFIWUKWA7890-02-25 05:42:00





             Test Item    Value        Reference Range Interpretation Comments

 

             Hct (test code = Hct) 43.9         42.0-54.0                 



[facility] BrookZvgynsvSSCKSHURLS1087-66-70 05:42:00





             Test Item    Value        Reference Range Interpretation Comments

 

             Hgb (test code = Hgb) 14.2         14.0-18.0                 



[facility] BrookBmuewnvPUDARHPTLZ8271-67-48 05:42:00





             Test Item    Value        Reference Range Interpretation Comments

 

             WBC (test code = WBC) 7.7          3.7-10.4                  



[facility] BrookMbuvdvmWKKEKJEGGG0545-49-27 05:42:00





             Test Item    Value        Reference Range Interpretation Comments

 

             RBC (test code = RBC) 5.15         4.70-6.10                 



[facility] BrookLvhgemrLBUMCMSNUI3720-45-80 05:42:00





             Test Item    Value        Reference Range Interpretation Comments

 

             MPV (test code = MPV) 8.4          7.4-10.4                  



[facility] BrookDqhcuqfEPIVUVFHZD1490-78-93 05:42:00





             Test Item    Value        Reference Range Interpretation Comments

 

             MCHC (test code = MCHC) 32.3         32.0-36.0                 



[facility] BrookUopikicDTSETFHCXR1711-14-49 05:42:00





             Test Item    Value        Reference Range Interpretation Comments

 

             RDW (test code = RDW) 17.3         11.5-14.5                 



[facility]tx[facility]HEUX[facility]EMFC[facility]





             Test Item    Value        Reference Range Interpretation Comments

 

             Platelet (test code = Platelet) 317          133-450               

    



[facility]2018-05-08 05:42:00





             Test Item    Value        Reference Range Interpretation Comments

 

             B/C Ratio (test code = B/C Ratio) 17 1         6-25                

      



[facility]2018-05-08 05:42:00





             Test Item    Value        Reference Range Interpretation Comments

 

             Globulin (test code = Globulin) 4.3          2.7-4.2               

    



[facility]2018-05-08 05:42:00





             Test Item    Value        Reference Range Interpretation Comments

 

             A/G Ratio (test code = A/G Ratio) 0.7 1        0.7-1.6             

      



[facility]2018-05-08 05:42:00





             Test Item    Value        Reference Range Interpretation Comments

 

             AGAP (test code = AGAP) 14.4         10.0-20.0                 



[facility]2018-05-08 05:42:00





             Test Item    Value        Reference Range Interpretation Comments

 

             eGFR (test code = eGFR) 113                                    



[facility]2018-05-08 05:42:00





             Test Item    Value        Reference Range Interpretation Comments

 

             Alk Phos (test code = Alk Phos) 76                           

    



[facility]2018-05-08 05:42:00





             Test Item    Value        Reference Range Interpretation Comments

 

             ALT (test code = ALT) 35           See_Comment                [Auto

mated message] The



                                                                 system which ge

nerated this



                                                                 result transmit

huma



                                                                 reference range

: <=65. The



                                                                 reference range

 was not



                                                                 used to interpr

et this



                                                                 result as zayda

l/abnormal.



[facility]2018-05-08 05:42:00





             Test Item    Value        Reference Range Interpretation Comments

 

             Albumin Lvl (test code = Albumin Lvl) 2.8          3.5-5.0         

          



[facility]2018-05-08 05:42:00





             Test Item    Value        Reference Range Interpretation Comments

 

             Total Protein (test code = Total 7.1          6.4-8.4              

     



             Protein)                                            



[facility]2018-05-08 05:42:00





             Test Item    Value        Reference Range Interpretation Comments

 

             Calcium Lvl (test code = Calcium Lvl) 8.7          8.5-10.5        

          



[facility]2018-05-08 05:42:00





             Test Item    Value        Reference Range Interpretation Comments

 

             AST (test code = AST) 18           See_Comment                [Auto

mated message] The



                                                                 system which ge

nerated this



                                                                 result transmit

huma



                                                                 reference range

: <=37. The



                                                                 reference range

 was not



                                                                 used to interpr

et this



                                                                 result as zayda

l/abnormal.



[facility]2018-05-08 05:42:00





             Test Item    Value        Reference Range Interpretation Comments

 

             Bili Total (test code = Bili Total) 0.3          0.2-1.3           

        



[facility]2018-05-08 05:42:00





             Test Item    Value        Reference Range Interpretation Comments

 

             Potassium Lvl (test code = Potassium 4.4          3.5-5.1          

         



             Lvl)                                                



[facility]2018-05-08 05:42:00





             Test Item    Value        Reference Range Interpretation Comments

 

             Chloride Lvl (test code = Chloride Lvl) 109                  

            



[facility]2018-05-08 05:42:00





             Test Item    Value        Reference Range Interpretation Comments

 

             CO2 (test code = CO2) 23           24-32                     



[facility]2018-05-08 05:42:00





             Test Item    Value        Reference Range Interpretation Comments

 

             Glucose Lvl (test code = Glucose Lvl) 114          70-99           

          



[facility]2018-05-08 05:42:00





             Test Item    Value        Reference Range Interpretation Comments

 

             Creatinine Lvl (test code = Creatinine 1.01         0.50-1.40      

           



             Lvl)                                                



[facility]2018-05-08 05:42:00





             Test Item    Value        Reference Range Interpretation Comments

 

             BUN (test code = BUN) 17           7-22                      



[facility]2018-05-08 05:42:00





             Test Item    Value        Reference Range Interpretation Comments

 

             Sodium Lvl (test code = Sodium Lvl) 142          135-145           

        



[facility] BrookQlgwqpxEDRGPAWSDY2759-42-15 05:42:00





             Test Item    Value        Reference Range Interpretation Comments

 

             Basophils (test code = 0.6          See_Comment                [Aut

omated message] The



             Basophils)                                          system which ge

nerated



                                                                 this result tra

nsmitted



                                                                 reference range

: <=1.0.



                                                                 The reference r

zhen was



                                                                 not used to int

erpret



                                                                 this result as



                                                                 normal/abnormal

.



[facility] BrookPojtwmcVEADJBKEPF2893-16-40 05:42:00





             Test Item    Value        Reference Range Interpretation Comments

 

             Segs-Bands # (test code = Segs-Bands #) 4.8          1.5-8.1       

            



[facility] BrookZodllmdOCQSCIJEEX0427-82-64 05:42:00





             Test Item    Value        Reference Range Interpretation Comments

 

             Monocytes # (test code 0.7          See_Comment                [Aut

omated message] The



             = Monocytes #)                                        system which 

generated



                                                                 this result tra

nsmitted



                                                                 reference range

: <=0.8.



                                                                 The reference r

zhen was



                                                                 not used to int

erpret



                                                                 this result as



                                                                 normal/abnormal

.



[facility] BrookBgbpyllEHRAOCSEMH8461-21-11 05:42:00





             Test Item    Value        Reference Range Interpretation Comments

 

             Lymphocytes # (test code = Lymphocytes 1.9          1.0-5.5        

           



             #)                                                  



[facility] BrookIodmgunDFQCKMALMG6604-11-83 05:42:00





             Test Item    Value        Reference Range Interpretation Comments

 

             Monocytes (test code = Monocytes) 9.4          2.0-12.0            

      



[facility] BrookAvqhovbVVMBSGFAWF7078-75-84 05:42:00





             Test Item    Value        Reference Range Interpretation Comments

 

             Eosinophils # (test code 0.2          See_Comment                [A

utomated message] The



             = Eosinophils #)                                        system whic

h generated



                                                                 this result tra

nsmitted



                                                                 reference range

: <=0.5.



                                                                 The reference r

zhen was



                                                                 not used to int

erpret



                                                                 this result as



                                                                 normal/abnormal

.



[facility] BrookWwadabxIXZYCXFRLU7747-14-07 05:42:00





             Test Item    Value        Reference Range Interpretation Comments

 

             Eosinophils (test code = 2.9          See_Comment                [A

utomated message] The



             Eosinophils)                                        system which ge

nerated



                                                                 this result tra

nsmitted



                                                                 reference range

: <=4.0.



                                                                 The reference r

zhen was



                                                                 not used to int

erpret



                                                                 this result as



                                                                 normal/abnormal

.



[facility] BrookTgnhjufYHJTWECUOM0965-70-03 05:42:00





             Test Item    Value        Reference Range Interpretation Comments

 

             Segs (test code = Segs) 62.2         45.0-75.0                 



[facility] BrookThfiikrNKQSAPRBOV8019-55-19 05:42:00





             Test Item    Value        Reference Range Interpretation Comments

 

             Lymphocytes (test code = Lymphocytes) 24.9         20.0-40.0       

          



[facility] BrookVnboyfmWMSMKPFYMF5745-76-54 05:42:00





             Test Item    Value        Reference Range Interpretation Comments

 

             MCH (test code = MCH) 27.5 pg      27.0-31.0                 



[facility] BrookLxmorzxGBHOLJRHZD5941-18-01 05:42:00





             Test Item    Value        Reference Range Interpretation Comments

 

             MCV (test code = MCV) 85.3         80.0-94.0                 



[facility] BrookPffvzxgJUAWFTWPGT6488-22-26 05:42:00





             Test Item    Value        Reference Range Interpretation Comments

 

             Hct (test code = Hct) 43.9         42.0-54.0                 



[facility] BrookPpmtfsgKOUHQKRXLL8231-88-95 05:42:00





             Test Item    Value        Reference Range Interpretation Comments

 

             Hgb (test code = Hgb) 14.2         14.0-18.0                 



[facility] BrookHztkrxoNOJPJGSCNV3538-01-07 05:42:00





             Test Item    Value        Reference Range Interpretation Comments

 

             WBC (test code = WBC) 7.7          3.7-10.4                  



[facility] BrookYqnwjlnWMQNKKDOZF8251-68-23 05:42:00





             Test Item    Value        Reference Range Interpretation Comments

 

             RBC (test code = RBC) 5.15         4.70-6.10                 



[facility] BrookXklwfdyZKNNABYNCN1767-20-05 05:42:00





             Test Item    Value        Reference Range Interpretation Comments

 

             MPV (test code = MPV) 8.4          7.4-10.4                  



[facility]774[facility]





             Test Item    Value        Reference Range Interpretation Comments

 

             MCHC (test code = MCHC) 32.3         32.0-36.0                 



[facility] BrookQadbejxHAXIPCBQCK5120-89-79 05:42:00





             Test Item    Value        Reference Range Interpretation Comments

 

             RDW (test code = RDW) 17.3         11.5-14.5                 



[facility] BrookCdznpraRSRBWDSABT3313-08-40 05:42:00





             Test Item    Value        Reference Range Interpretation Comments

 

             Platelet (test code = Platelet) 317          133-450               

    



[facility]2018-05-07 09:36:00





             Test Item    Value        Reference Range Interpretation Comments

 

             Globulin (test code = Globulin) 4.4          2.7-4.2               

    



[facility]2018-05-07 09:36:00





             Test Item    Value        Reference Range Interpretation Comments

 

             A/G Ratio (test code = A/G Ratio) 0.6 1        0.7-1.6             

      



[facility]2018-05-07 09:36:00





             Test Item    Value        Reference Range Interpretation Comments

 

             B/C Ratio (test code = B/C Ratio) 17 1         6-25                

      



[facility]2018-05-07 09:36:00





             Test Item    Value        Reference Range Interpretation Comments

 

             AGAP (test code = AGAP) 11.3         10.0-20.0                 



[facility]2018-05-07 09:36:00





             Test Item    Value        Reference Range Interpretation Comments

 

             eGFR (test code = eGFR) 134                                    



[facility]2018-05-07 09:36:00





             Test Item    Value        Reference Range Interpretation Comments

 

             Creatinine Lvl (test code = Creatinine 0.84         0.50-1.40      

           



             Lvl)                                                



[facility]2018-05-07 09:36:00





             Test Item    Value        Reference Range Interpretation Comments

 

             Sodium Lvl (test code = Sodium Lvl) 142          135-145           

        



[facility]2018-05-07 09:36:00





             Test Item    Value        Reference Range Interpretation Comments

 

             Glucose Lvl (test code = Glucose Lvl) 99           70-99           

          



[facility]2018-05-07 09:36:00





             Test Item    Value        Reference Range Interpretation Comments

 

             BUN (test code = BUN) 14           7-22                      



[facility]2018-05-07 09:36:00





             Test Item    Value        Reference Range Interpretation Comments

 

             Alk Phos (test code = Alk Phos) 79                           

    



[facility]2018-05-07 09:36:00





             Test Item    Value        Reference Range Interpretation Comments

 

             Bili Total (test code = Bili Total) 0.3          0.2-1.3           

        



[facility]2018-05-07 09:36:00





             Test Item    Value        Reference Range Interpretation Comments

 

             AST (test code = AST) 14           See_Comment                [Auto

mated message] The



                                                                 system which ge

nerated this



                                                                 result transmit

huma



                                                                 reference range

: <=37. The



                                                                 reference range

 was not



                                                                 used to interpr

et this



                                                                 result as zayda

l/abnormal.



[facility]2018-05-07 09:36:00





             Test Item    Value        Reference Range Interpretation Comments

 

             ALT (test code = ALT) 43           See_Comment                [Auto

mated message] The



                                                                 system which ge

nerated this



                                                                 result transmit

huma



                                                                 reference range

: <=65. The



                                                                 reference range

 was not



                                                                 used to interpr

et this



                                                                 result as zayda

l/abnormal.



[facility]2018-05-07 09:36:00





             Test Item    Value        Reference Range Interpretation Comments

 

             Total Protein (test code = Total 7.1          6.4-8.4              

     



             Protein)                                            



[facility]2018-05-07 09:36:00





             Test Item    Value        Reference Range Interpretation Comments

 

             Albumin Lvl (test code = Albumin Lvl) 2.7          3.5-5.0         

          



[place] [id number] 09:36:00





             Test Item    Value        Reference Range Interpretation Comments

 

             Calcium Lvl (test code = Calcium Lvl) 9.2          8.5-10.5        

          



[facility]2018-05-07 09:36:00





             Test Item    Value        Reference Range Interpretation Comments

 

             CO2 (test code = CO2) 21           24-32                     



[place] 892398-05-07 09:36:00





             Test Item    Value        Reference Range Interpretation Comments

 

             Potassium Lvl (test code = Potassium 4.3          3.5-5.1          

         



             Lvl)                                                



[facility]2018-05-07 09:36:00





             Test Item    Value        Reference Range Interpretation Comments

 

             Chloride Lvl (test code = Chloride Lvl) 114                  

            



[facility] BrookMreibtkMWICNFBJFL1116-57-57 09:36:00





             Test Item    Value        Reference Range Interpretation Comments

 

             MCHC (test code = MCHC) 32.5         32.0-36.0                 



[facility] BrookGhkffjfHVUINTPCII7877-17-60 09:36:00





             Test Item    Value        Reference Range Interpretation Comments

 

             RDW (test code = RDW) 17.5         11.5-14.5                 



[facility] BrookQlpwykaXSDCMAJWZF5775-78-36 09:36:00





             Test Item    Value        Reference Range Interpretation Comments

 

             Platelet (test code = Platelet) 400          133-450               

    



[facility] BrookOrvozejYGPZHVMMKQ7863-54-81 09:36:00





             Test Item    Value        Reference Range Interpretation Comments

 

             MPV (test code = MPV) 8.5          7.4-10.4                  



[facility] BrookMaorbomJWAICACWKC5361-51-90 09:36:00





             Test Item    Value        Reference Range Interpretation Comments

 

             WBC (test code = WBC) 6.6          3.7-10.4                  



[facility] BrookLziujtzRQBXPIHTLQ3895-07-54 09:36:00





             Test Item    Value        Reference Range Interpretation Comments

 

             RBC (test code = RBC) 5.17         4.70-6.10                 



[facility] BrookYicdjluJWXTXWWVKT6738-26-28 09:36:00





             Test Item    Value        Reference Range Interpretation Comments

 

             MCV (test code = MCV) 86.1         80.0-94.0                 



[facility] BrookLqxyedoTBITUJWYNZ3434-56-90 09:36:00





             Test Item    Value        Reference Range Interpretation Comments

 

             Hct (test code = Hct) 44.5         42.0-54.0                 



[facility] BrookUfvajccIVVUNJWNEE9314-59-19 09:36:00





             Test Item    Value        Reference Range Interpretation Comments

 

             MCH (test code = MCH) 28.0 pg      27.0-31.0                 



[facility] BrookEjkwhqbSTYEKFNQVL8434-12-55 09:36:00





             Test Item    Value        Reference Range Interpretation Comments

 

             Hgb (test code = Hgb) 14.5         14.0-18.0                 



[facility] BrookIfaenmnTAKPUBRHKF0298-93-36 09:36:00





             Test Item    Value        Reference Range Interpretation Comments

 

             Lymphocytes # (test code = Lymphocytes 1.7          1.0-5.5        

           



             #)                                                  



[facility] BrookZvmkfeqVWFTAMHEJG7622-59-68 09:36:00





             Test Item    Value        Reference Range Interpretation Comments

 

             Monocytes # (test code 0.7          See_Comment                [Aut

omated message] The



             = Monocytes #)                                        system which 

generated



                                                                 this result tra

nsmitted



                                                                 reference range

: <=0.8.



                                                                 The reference r

zhen was



                                                                 not used to int

erpret



                                                                 this result as



                                                                 normal/abnormal

.



[facility] BrookKqailvfQVAJIBVZVY4582-70-57 09:36:00





             Test Item    Value        Reference Range Interpretation Comments

 

             Eosinophils # (test code 0.2          See_Comment                [A

utomated message] The



             = Eosinophils #)                                        system whic

h generated



                                                                 this result tra

nsmitted



                                                                 reference range

: <=0.5.



                                                                 The reference r

zhen was



                                                                 not used to int

erpret



                                                                 this result as



                                                                 normal/abnormal

.



[facility] BrookAoxuugqAXQZEINNQS3706-83-23 09:36:00





             Test Item    Value        Reference Range Interpretation Comments

 

             Lymphocytes (test code = Lymphocytes) 26.1         20.0-40.0       

          



[facility] BrookCyxtiphFEOMOIMRML7892-43-55 09:36:00





             Test Item    Value        Reference Range Interpretation Comments

 

             Segs (test code = Segs) 59.3         45.0-75.0                 



[facility] BrookEefmprrSYZBZVYHRL0702-50-59 09:36:00





             Test Item    Value        Reference Range Interpretation Comments

 

             Basophils (test code = 0.8          See_Comment                [Aut

omated message] The



             Basophils)                                          system which ge

nerated



                                                                 this result tra

nsmitted



                                                                 reference range

: <=1.0.



                                                                 The reference r

zhen was



                                                                 not used to int

erpret



                                                                 this result as



                                                                 normal/abnormal

.



[facility] BrookXpzlbxbXODFFAXYMO3578-38-60 09:36:00





             Test Item    Value        Reference Range Interpretation Comments

 

             Monocytes (test code = Monocytes) 10.5         2.0-12.0            

      



[facility] BrookLfdqsdkZQPBMKSFIA7631-67-89 09:36:00





             Test Item    Value        Reference Range Interpretation Comments

 

             Eosinophils (test code = 3.3          See_Comment                [A

utomated message] The



             Eosinophils)                                        system which ge

nerated



                                                                 this result tra

nsmitted



                                                                 reference range

: <=4.0.



                                                                 The reference r

zhen was



                                                                 not used to int

erpret



                                                                 this result as



                                                                 normal/abnormal

.



[facility] BrookKotzepzASNPXAOBSD9573-63-81 09:36:00





             Test Item    Value        Reference Range Interpretation Comments

 

             Segs-Bands # (test code = Segs-Bands #) 3.9          1.5-8.1       

            



[facility]2018-05-07 09:36:00





             Test Item    Value        Reference Range Interpretation Comments

 

             Globulin (test code = Globulin) 4.4          2.7-4.2               

    



[facility]2018-05-07 09:36:00





             Test Item    Value        Reference Range Interpretation Comments

 

             A/G Ratio (test code = A/G Ratio) 0.6 1        0.7-1.6             

      



[place] [address]-05-07 09:36:00





             Test Item    Value        Reference Range Interpretation Comments

 

             B/C Ratio (test code = B/C Ratio) 17 1         6-25                

      



[place] 892398-05-07 09:36:00





             Test Item    Value        Reference Range Interpretation Comments

 

             AGAP (test code = AGAP) 11.3         10.0-20.0                 



[facility]2018-05-07 09:36:00





             Test Item    Value        Reference Range Interpretation Comments

 

             eGFR (test code = eGFR) 134                                    



[facility]2018-05-07 09:36:00





             Test Item    Value        Reference Range Interpretation Comments

 

             Creatinine Lvl (test code = Creatinine 0.84         0.50-1.40      

           



             Lvl)                                                



[facility]2018-05-07 09:36:00





             Test Item    Value        Reference Range Interpretation Comments

 

             Sodium Lvl (test code = Sodium Lvl) 142          135-145           

        



[facility]2018-05-07 09:36:00





             Test Item    Value        Reference Range Interpretation Comments

 

             Glucose Lvl (test code = Glucose Lvl) 99           70-99           

          



[facility]2018-05-07 09:36:00





             Test Item    Value        Reference Range Interpretation Comments

 

             BUN (test code = BUN) 14           7-22                      



[place] 892398-05-07 09:36:00





             Test Item    Value        Reference Range Interpretation Comments

 

             Alk Phos (test code = Alk Phos) 79                           

    



[facility]2018-05-07 09:36:00





             Test Item    Value        Reference Range Interpretation Comments

 

             Bili Total (test code = Bili Total) 0.3          0.2-1.3           

        



[facility]2018-05-07 09:36:00





             Test Item    Value        Reference Range Interpretation Comments

 

             AST (test code = AST) 14           See_Comment                [Auto

mated message] The



                                                                 system which ge

nerated this



                                                                 result transmit

huma



                                                                 reference range

: <=37. The



                                                                 reference range

 was not



                                                                 used to interpr

et this



                                                                 result as zayda

l/abnormal.



[place] 892398-05-07 09:36:00





             Test Item    Value        Reference Range Interpretation Comments

 

             ALT (test code = ALT) 43           See_Comment                [Auto

mated message] The



                                                                 system which ge

nerated this



                                                                 result transmit

huma



                                                                 reference range

: <=65. The



                                                                 reference range

 was not



                                                                 used to interpr

et this



                                                                 result as zayda

l/abnormal.



[facility]2018-05-07 09:36:00





             Test Item    Value        Reference Range Interpretation Comments

 

             Total Protein (test code = Total 7.1          6.4-8.4              

     



             Protein)                                            



[place] 892398-05-07 09:36:00





             Test Item    Value        Reference Range Interpretation Comments

 

             Albumin Lvl (test code = Albumin Lvl) 2.7          3.5-5.0         

          



[place] 892398-05-07 09:36:00





             Test Item    Value        Reference Range Interpretation Comments

 

             Calcium Lvl (test code = Calcium Lvl) 9.2          8.5-10.5        

          



[place] 892398-05-07 09:36:00





             Test Item    Value        Reference Range Interpretation Comments

 

             CO2 (test code = CO2) 21           24-32                     



[place] 892398-05-07 09:36:00





             Test Item    Value        Reference Range Interpretation Comments

 

             Potassium Lvl (test code = Potassium 4.3          3.5-5.1          

         



             Lvl)                                                



[facility]2018-05-07 09:36:00





             Test Item    Value        Reference Range Interpretation Comments

 

             Chloride Lvl (test code = Chloride Lvl) 114                  

            



[facility] BrookEndwrqnFCDMVOYGXA0987-53-87 09:36:00





             Test Item    Value        Reference Range Interpretation Comments

 

             MCHC (test code = MCHC) 32.5         32.0-36.0                 



[facility] BrookEwgxrgeQPBZTZHEAZ4364-85-01 09:36:00





             Test Item    Value        Reference Range Interpretation Comments

 

             RDW (test code = RDW) 17.5         11.5-14.5                 



[facility] BrookSwmrzdcBJVPMWEDIY9939-50-20 09:36:00





             Test Item    Value        Reference Range Interpretation Comments

 

             Platelet (test code = Platelet) 400          133-450               

    



[facility] BrookPatygpqXKDVUSXQAT6149-90-94 09:36:00





             Test Item    Value        Reference Range Interpretation Comments

 

             MPV (test code = MPV) 8.5          7.4-10.4                  



[facility] BrookImilkeySPIJBHJPQK3787-69-21 09:36:00





             Test Item    Value        Reference Range Interpretation Comments

 

             WBC (test code = WBC) 6.6          3.7-10.4                  



[facility] BrookEtthdpqLUHRPXBION3654-11-99 09:36:00





             Test Item    Value        Reference Range Interpretation Comments

 

             RBC (test code = RBC) 5.17         4.70-6.10                 



[facility] BrookDwlabdjZYSHRCJSBU9866-90-71 09:36:00





             Test Item    Value        Reference Range Interpretation Comments

 

             MCV (test code = MCV) 86.1         80.0-94.0                 



[facility] BrookYqdgmajAYNJXGDVZY2081-97-53 09:36:00





             Test Item    Value        Reference Range Interpretation Comments

 

             Hct (test code = Hct) 44.5         42.0-54.0                 



[facility] BrookTjdwprsOUHWMYHKFZ4458-55-71 09:36:00





             Test Item    Value        Reference Range Interpretation Comments

 

             MCH (test code = MCH) 28.0 pg      27.0-31.0                 



[facility] BrookIevrkjmNFJOGIAKLS3404-29-25 09:36:00





             Test Item    Value        Reference Range Interpretation Comments

 

             Hgb (test code = Hgb) 14.5         14.0-18.0                 



[facility] BrookLkmktzxXHXOFZPNKJ3246-92-23 09:36:00





             Test Item    Value        Reference Range Interpretation Comments

 

             Lymphocytes # (test code = Lymphocytes 1.7          1.0-5.5        

           



             #)                                                  



[facility] BrookPygnjslAUYSWKKVOJ2565-29-00 09:36:00





             Test Item    Value        Reference Range Interpretation Comments

 

             Monocytes # (test code 0.7          See_Comment                [Aut

omated message] The



             = Monocytes #)                                        system which 

generated



                                                                 this result tra

nsmitted



                                                                 reference range

: <=0.8.



                                                                 The reference r

zhen was



                                                                 not used to int

erpret



                                                                 this result as



                                                                 normal/abnormal

.



[facility] BrookUbdaswqXVOIEFWTNB5115-05-57 09:36:00





             Test Item    Value        Reference Range Interpretation Comments

 

             Eosinophils # (test code 0.2          See_Comment                [A

utomated message] The



             = Eosinophils #)                                        system whic

h generated



                                                                 this result tra

nsmitted



                                                                 reference range

: <=0.5.



                                                                 The reference r

zhen was



                                                                 not used to int

erpret



                                                                 this result as



                                                                 normal/abnormal

.



[facility] BrookAofjaduJJOQPUEFTV4939-65-69 09:36:00





             Test Item    Value        Reference Range Interpretation Comments

 

             Lymphocytes (test code = Lymphocytes) 26.1         20.0-40.0       

          



[facility] BrookQezpwgaYKTINGOWGG8497-68-15 09:36:00





             Test Item    Value        Reference Range Interpretation Comments

 

             Segs (test code = Segs) 59.3         45.0-75.0                 



[facility] BrookPnibhniUXWYNVAPMD5045-12-12 09:36:00





             Test Item    Value        Reference Range Interpretation Comments

 

             Basophils (test code = 0.8          See_Comment                [Aut

omated message] The



             Basophils)                                          system which ge

nerated



                                                                 this result tra

nsmitted



                                                                 reference range

: <=1.0.



                                                                 The reference r

zhen was



                                                                 not used to int

erpret



                                                                 this result as



                                                                 normal/abnormal

.



[facility]JKWZ[facility]12[facility]





             Test Item    Value        Reference Range Interpretation Comments

 

             Monocytes (test code = Monocytes) 10.5         2.0-12.0            

      



[facility] BrookWkjjsnqSMUQMEGNBM3531-58-15 09:36:00





             Test Item    Value        Reference Range Interpretation Comments

 

             Eosinophils (test code = 3.3          See_Comment                [A

utomated message] The



             Eosinophils)                                        system which ge

nerated



                                                                 this result tra

nsmitted



                                                                 reference range

: <=4.0.



                                                                 The reference r

zhen was



                                                                 not used to int

erpret



                                                                 this result as



                                                                 normal/abnormal

.



[facility] BrookNaklwxsBVGNJXIGKN1426-67-94 09:36:00





             Test Item    Value        Reference Range Interpretation Comments

 

             Segs-Bands # (test code = Segs-Bands #) 3.9          1.5-8.1       

            



[id number]





             Test Item    Value        Reference Range Interpretation Comments

 

             Globulin (test code = Globulin) 4.4          2.7-4.2               

    



[facility] [id number] 09[facility]36:00





             Test Item    Value        Reference Range Interpretation Comments

 

             A/G Ratio (test code = A/G Ratio) 0.6 1        0.7-1.6             

      



[facility] [id number] 09:36:00





             Test Item    Value        Reference Range Interpretation Comments

 

             B/C Ratio (test code = B/C Ratio) 17 1         6-25                

      



[facility]2018-05-07 09:36:00





             Test Item    Value        Reference Range Interpretation Comments

 

             AGAP (test code = AGAP) 11.3         10.0-20.0                 



[facility]2018-05-07 09:36:00





             Test Item    Value        Reference Range Interpretation Comments

 

             eGFR (test code = eGFR) 134                                    



[facility]2018-05-07 09:36:00





             Test Item    Value        Reference Range Interpretation Comments

 

             Creatinine Lvl (test code = Creatinine 0.84         0.50-1.40      

           



             Lvl)                                                



[facility]2018-05-07 09:36:00





             Test Item    Value        Reference Range Interpretation Comments

 

             Sodium Lvl (test code = Sodium Lvl) 142          135-145           

        



[place] 892398-05-07 09:36:00





             Test Item    Value        Reference Range Interpretation Comments

 

             Glucose Lvl (test code = Glucose Lvl) 99           70-99           

          



[place] 892398-05-07 09:36:00





             Test Item    Value        Reference Range Interpretation Comments

 

             BUN (test code = BUN) 14           7-22                      



[facility]2018-05-07 09:36:00





             Test Item    Value        Reference Range Interpretation Comments

 

             Alk Phos (test code = Alk Phos) 79                           

    



[place] 892398-05-07 09:36:00





             Test Item    Value        Reference Range Interpretation Comments

 

             Bili Total (test code = Bili Total) 0.3          0.2-1.3           

        



[place] 892398-05-07 09:36:00





             Test Item    Value        Reference Range Interpretation Comments

 

             AST (test code = AST) 14           See_Comment                [Auto

mated message] The



                                                                 system which ge

nerated this



                                                                 result transmit

huma



                                                                 reference range

: <=37. The



                                                                 reference range

 was not



                                                                 used to interpr

et this



                                                                 result as zayda

l/abnormal.



[place] 892398-05-07 09:36:00





             Test Item    Value        Reference Range Interpretation Comments

 

             ALT (test code = ALT) 43           See_Comment                [Auto

mated message] The



                                                                 system which ge

nerated this



                                                                 result transmit

huma



                                                                 reference range

: <=65. The



                                                                 reference range

 was not



                                                                 used to interpr

et this



                                                                 result as zayda

l/abnormal.



[place] 892398-05-07 09:36:00





             Test Item    Value        Reference Range Interpretation Comments

 

             Total Protein (test code = Total 7.1          6.4-8.4              

     



             Protein)                                            



[facility]2018-05-07 09:36:00





             Test Item    Value        Reference Range Interpretation Comments

 

             Albumin Lvl (test code = Albumin Lvl) 2.7          3.5-5.0         

          



[place] 892398-05-07 09:36:00





             Test Item    Value        Reference Range Interpretation Comments

 

             Calcium Lvl (test code = Calcium Lvl) 9.2          8.5-10.5        

          



[facility]2018-05-07 09:36:00





             Test Item    Value        Reference Range Interpretation Comments

 

             CO2 (test code = CO2) 21           24-32                     



[place] 892398-05-07 09:36:00





             Test Item    Value        Reference Range Interpretation Comments

 

             Potassium Lvl (test code = Potassium 4.3          3.5-5.1          

         



             Lvl)                                                



[facility]2018-05-07 09:36:00





             Test Item    Value        Reference Range Interpretation Comments

 

             Chloride Lvl (test code = Chloride Lvl) 114                  

            



[facility]IhptiqoTWPVBRIBHA8194-00-39 09:36:00





             Test Item    Value        Reference Range Interpretation Comments

 

             MCHC (test code = MCHC) 32.5         32.0-36.0                 



[facility] BrookAkwwntwZULRQHZOQI7652-79-76 09:36:00





             Test Item    Value        Reference Range Interpretation Comments

 

             RDW (test code = RDW) 17.5         11.5-14.5                 



[facility] BrookXgdgxanTFTBHGTSFV7328-30-03 09:36:00





             Test Item    Value        Reference Range Interpretation Comments

 

             Platelet (test code = Platelet) 400          133-450               

    



[facility] BrookZjrcfvqRKYVQLSXXS5408-44-03 09:36:00





             Test Item    Value        Reference Range Interpretation Comments

 

             MPV (test code = MPV) 8.5          7.4-10.4                  



[facility] BrookXxmutchMJWYJOJTSW9214-44-77 09:36:00





             Test Item    Value        Reference Range Interpretation Comments

 

             WBC (test code = WBC) 6.6          3.7-10.4                  



[facility] BrookPpkuzkfHWXKPWMASW6732-66-43 09:36:00





             Test Item    Value        Reference Range Interpretation Comments

 

             RBC (test code = RBC) 5.17         4.70-6.10                 



[place] 526068-05-07 09:36:00





             Test Item    Value        Reference Range Interpretation Comments

 

             MCV (test code = MCV) 86.1         80.0-94.0                 



[place] 526068-05-07 09:36:00





             Test Item    Value        Reference Range Interpretation Comments

 

             Hct (test code = Hct) 44.5         42.0-54.0                 



[facility] BrookSrqezynWJMGAQNLTG5238-48-59 09:36:00





             Test Item    Value        Reference Range Interpretation Comments

 

             MCH (test code = MCH) 28.0 pg      27.0-31.0                 



[facility] BrookErlnvnxHDZLAHMGLE2588-91-27 09:36:00





             Test Item    Value        Reference Range Interpretation Comments

 

             Hgb (test code = Hgb) 14.5         14.0-18.0                 



[facility] BrookHpamvpiKOOZNTYRBB8303-00-20 09:36:00





             Test Item    Value        Reference Range Interpretation Comments

 

             Lymphocytes # (test code = Lymphocytes 1.7          1.0-5.5        

           



             #)                                                  



[facility] BrookTmysswzASQENSYUSQ2354-27-88 09:36:00





             Test Item    Value        Reference Range Interpretation Comments

 

             Monocytes # (test code 0.7          See_Comment                [Aut

omated message] The



             = Monocytes #)                                        system which 

generated



                                                                 this result tra

nsmitted



                                                                 reference range

: <=0.8.



                                                                 The reference r

zhen was



                                                                 not used to int

erpret



                                                                 this result as



                                                                 normal/abnormal

.



[facility] BrookJzqzyjaGRBMNRIIAA9653-41-41 09:36:00





             Test Item    Value        Reference Range Interpretation Comments

 

             Eosinophils # (test code 0.2          See_Comment                [A

utomated message] The



             = Eosinophils #)                                        system whic

h generated



                                                                 this result tra

nsmitted



                                                                 reference range

: <=0.5.



                                                                 The reference r

zhen was



                                                                 not used to int

erpret



                                                                 this result as



                                                                 normal/abnormal

.



[facility] BrookYspzousZCWPUPBUFG7111-59-95 09:36:00





             Test Item    Value        Reference Range Interpretation Comments

 

             Lymphocytes (test code = Lymphocytes) 26.1         20.0-40.0       

          



[facility] BrookXczdzqsYSXYARCVHX0376-10-37 09:36:00





             Test Item    Value        Reference Range Interpretation Comments

 

             Segs (test code = Segs) 59.3         45.0-75.0                 



[facility] BrookItuocxtNMFGPODUSM1129-83-42 09:36:00





             Test Item    Value        Reference Range Interpretation Comments

 

             Basophils (test code = 0.8          See_Comment                [Aut

omated message] The



             Basophils)                                          system which ge

nerated



                                                                 this result tra

nsmitted



                                                                 reference range

: <=1.0.



                                                                 The reference r

zhen was



                                                                 not used to int

erpret



                                                                 this result as



                                                                 normal/abnormal

.



[facility] BrookQfxmpuwTNAQXLSLKA1787-39-15 09:36:00





             Test Item    Value        Reference Range Interpretation Comments

 

             Monocytes (test code = Monocytes) 10.5         2.0-12.0            

      



[facility] BrookTvcwoxhUEDUMHRMPJ7922-94-54 09:36:00





             Test Item    Value        Reference Range Interpretation Comments

 

             Eosinophils (test code = 3.3          See_Comment                [A

utomated message] The



             Eosinophils)                                        system which ge

nerated



                                                                 this result tra

nsmitted



                                                                 reference range

: <=4.0.



                                                                 The reference r

zhen was



                                                                 not used to int

erpret



                                                                 this result as



                                                                 normal/abnormal

.



[facility] BrookWsotzypPQXDRLKLNY0476-86-32 09:36:00





             Test Item    Value        Reference Range Interpretation Comments

 

             Segs-Bands # (test code = Segs-Bands #) 3.9          1.5-8.1       

            



[place] 892398-05-07 09:36:00





             Test Item    Value        Reference Range Interpretation Comments

 

             Globulin (test code = Globulin) 4.4          2.7-4.2               

    



[place] 892398-05-07 09:36:00





             Test Item    Value        Reference Range Interpretation Comments

 

             A/G Ratio (test code = A/G Ratio) 0.6 1        0.7-1.6             

      



[place] 892398-05-07 09:36:00





             Test Item    Value        Reference Range Interpretation Comments

 

             B/C Ratio (test code = B/C Ratio) 17 1         6-25                

      



[facility]2018-05-07 09:36:00





             Test Item    Value        Reference Range Interpretation Comments

 

             AGAP (test code = AGAP) 11.3         10.0-20.0                 



[facility]2018-05-07 09:36:00





             Test Item    Value        Reference Range Interpretation Comments

 

             eGFR (test code = eGFR) 134                                    



[facility]2018-05-07 09:36:00





             Test Item    Value        Reference Range Interpretation Comments

 

             Creatinine Lvl (test code = Creatinine 0.84         0.50-1.40      

           



             Lvl)                                                



[facility]2018-05-07 09:36:00





             Test Item    Value        Reference Range Interpretation Comments

 

             Sodium Lvl (test code = Sodium Lvl) 142          135-145           

        



[facility]2018-05-07 09:36:00





             Test Item    Value        Reference Range Interpretation Comments

 

             Glucose Lvl (test code = Glucose Lvl) 99           70-99           

          



[place] 892398-05-07 09:36:00





             Test Item    Value        Reference Range Interpretation Comments

 

             BUN (test code = BUN) 14           7-22                      



[facility]2018-05-07 09:36:00





             Test Item    Value        Reference Range Interpretation Comments

 

             Alk Phos (test code = Alk Phos) 79                           

    



[facility]2018-05-07 09:36:00





             Test Item    Value        Reference Range Interpretation Comments

 

             Bili Total (test code = Bili Total) 0.3          0.2-1.3           

        



[place] 892398-05-07 09:36:00





             Test Item    Value        Reference Range Interpretation Comments

 

             AST (test code = AST) 14           See_Comment                [Auto

mated message] The



                                                                 system which ge

nerated this



                                                                 result transmit

huma



                                                                 reference range

: <=37. The



                                                                 reference range

 was not



                                                                 used to interpr

et this



                                                                 result as zayda

l/abnormal.



[facility]2018-05-07 09:36:00





             Test Item    Value        Reference Range Interpretation Comments

 

             ALT (test code = ALT) 43           See_Comment                [Auto

mated message] The



                                                                 system which ge

nerated this



                                                                 result transmit

huma



                                                                 reference range

: <=65. The



                                                                 reference range

 was not



                                                                 used to interpr

et this



                                                                 result as zayda

l/abnormal.



[facility]2018-05-07 09:36:00





             Test Item    Value        Reference Range Interpretation Comments

 

             Total Protein (test code = Total 7.1          6.4-8.4              

     



             Protein)                                            



[facility]2018-05-07 09:36:00





             Test Item    Value        Reference Range Interpretation Comments

 

             Albumin Lvl (test code = Albumin Lvl) 2.7          3.5-5.0         

          



[facility]2018-05-07 09:36:00





             Test Item    Value        Reference Range Interpretation Comments

 

             Calcium Lvl (test code = Calcium Lvl) 9.2          8.5-10.5        

          



[facility]2018-05-07 09:36:00





             Test Item    Value        Reference Range Interpretation Comments

 

             CO2 (test code = CO2) 21           24-32                     



[facility]2018-05-07 09:36:00





             Test Item    Value        Reference Range Interpretation Comments

 

             Potassium Lvl (test code = Potassium 4.3          3.5-5.1          

         



             Lvl)                                                



[facility]2018-05-07 09:36:00





             Test Item    Value        Reference Range Interpretation Comments

 

             Chloride Lvl (test code = Chloride Lvl) 114                  

            



[facility]





             Test Item    Value        Reference Range Interpretation Comments

 

             MCHC (test code = MCHC) 32.5         32.0-36.0                 



[facility] BrookVddpuonTWVNNKNSPS7737-85-54 09:36:00





             Test Item    Value        Reference Range Interpretation Comments

 

             RDW (test code = RDW) 17.5         11.5-14.5                 



[facility] BrookYvxizpdSIVBZVGOFO1905-37-68 09:36:00





             Test Item    Value        Reference Range Interpretation Comments

 

             Platelet (test code = Platelet) 400          133-450               

    



[facility] BrookQvolhuiTGSQKKXQAK0629-74-03 09:36:00





             Test Item    Value        Reference Range Interpretation Comments

 

             MPV (test code = MPV) 8.5          7.4-10.4                  



[facility] BrookAwmvveqIRPIPJTXFG3905-47-50 09:36:00





             Test Item    Value        Reference Range Interpretation Comments

 

             WBC (test code = WBC) 6.6          3.7-10.4                  



[facility] BrookHqtymktZGEWGROFPA3868-67-66 09:36:00





             Test Item    Value        Reference Range Interpretation Comments

 

             RBC (test code = RBC) 5.17         4.70-6.10                 



[facility] BrookZhbqudvTGJGPWGYTM4457-54-28 09:36:00





             Test Item    Value        Reference Range Interpretation Comments

 

             MCV (test code = MCV) 86.1         80.0-94.0                 



[facility] BrookPbcgzxiFAFUQQVHAD8287-70-85 09:36:00





             Test Item    Value        Reference Range Interpretation Comments

 

             Hct (test code = Hct) 44.5         42.0-54.0                 



[facility] BrookKymykxdLXVSDTOYRM6023-67-68 09:36:00





             Test Item    Value        Reference Range Interpretation Comments

 

             MCH (test code = MCH) 28.0 pg      27.0-31.0                 



[facility] BrookEoyasstYOLXAZYHGG5395-99-65 09:36:00





             Test Item    Value        Reference Range Interpretation Comments

 

             Hgb (test code = Hgb) 14.5         14.0-18.0                 



[facility] BrookLwkgiycGICGRSLHGF5961-02-00 09:36:00





             Test Item    Value        Reference Range Interpretation Comments

 

             Lymphocytes # (test code = Lymphocytes 1.7          1.0-5.5        

           



             #)                                                  



[facility] BrookWaxbikhDQNBYYKMGM7687-58-60 09:36:00





             Test Item    Value        Reference Range Interpretation Comments

 

             Monocytes # (test code 0.7          See_Comment                [Aut

omated message] The



             = Monocytes #)                                        system which 

generated



                                                                 this result tra

nsmitted



                                                                 reference range

: <=0.8.



                                                                 The reference r

zhen was



                                                                 not used to int

erpret



                                                                 this result as



                                                                 normal/abnormal

.



[facility] BrookKbnxlogGWKCZVVWSE4789-40-98 09:36:00





             Test Item    Value        Reference Range Interpretation Comments

 

             Eosinophils # (test code 0.2          See_Comment                [A

utomated message] The



             = Eosinophils #)                                        system whic

h generated



                                                                 this result tra

nsmitted



                                                                 reference range

: <=0.5.



                                                                 The reference r

zhen was



                                                                 not used to int

erpret



                                                                 this result as



                                                                 normal/abnormal

.



[facility] BrookQmbgmtfCKDAWYAKLO0922-01-99 09:36:00





             Test Item    Value        Reference Range Interpretation Comments

 

             Lymphocytes (test code = Lymphocytes) 26.1         20.0-40.0       

          



[facility] [facility] [facility]kzhjpmQFSHPTDXMH2840-82-84 09:36:00





             Test Item    Value        Reference Range Interpretation Comments

 

             Segs (test code = Segs) 59.3         45.0-75.0                 



[facility] BrookEzjakmrLKNSLBDQNA6848-23-81 09:36:00





             Test Item    Value        Reference Range Interpretation Comments

 

             Basophils (test code = 0.8          See_Comment                [Aut

omated message] The



             Basophils)                                          system which ge

nerated



                                                                 this result tra

nsmitted



                                                                 reference range

: <=1.0.



                                                                 The reference r

zhen was



                                                                 not used to int

erpret



                                                                 this result as



                                                                 normal/abnormal

.



[facility] BrookTfcvjekTAUVZMYVNS6460-08-58 09:36:00





             Test Item    Value        Reference Range Interpretation Comments

 

             Monocytes (test code = Monocytes) 10.5         2.0-12.0            

      



[facility] BrookZwtkgwwKVSUZRNOZP1756-95-04 09:36:00





             Test Item    Value        Reference Range Interpretation Comments

 

             Eosinophils (test code = 3.3          See_Comment                [A

utomated message] The



             Eosinophils)                                        system which ge

nerated



                                                                 this result tra

nsmitted



                                                                 reference range

: <=4.0.



                                                                 The reference r

zhen was



                                                                 not used to int

erpret



                                                                 this result as



                                                                 normal/abnormal

.



[facility] BrookLyjryrqXWNPPYKUHM3068-30-59 09:36:00





             Test Item    Value        Reference Range Interpretation Comments

 

             Segs-Bands # (test code = Segs-Bands #) 3.9          1.5-8.1       

            



[facility] ML[facility]42[facility]





             Test Item    Value        Reference Range Interpretation Comments

 

             Globulin (test code = Globulin) 4.4          2.7-4.2               

    



[facility] JYSJI5554[facility]46[facility]33 09:36:00





             Test Item    Value        Reference Range Interpretation Comments

 

             A/G Ratio (test code = A/G Ratio) 0.6 1        0.7-1.6             

      



[place] [address]-05-07 09:36:00





             Test Item    Value        Reference Range Interpretation Comments

 

             B/C Ratio (test code = B/C Ratio) 17 1         6-25                

      



[place] [address]-05-07 09:36:00





             Test Item    Value        Reference Range Interpretation Comments

 

             AGAP (test code = AGAP) 11.3         10.0-20.0                 



[place] 892398-05-07 09:36:00





             Test Item    Value        Reference Range Interpretation Comments

 

             eGFR (test code = eGFR) 134                                    



[place] [address]-05-07 09:36:00





             Test Item    Value        Reference Range Interpretation Comments

 

             Creatinine Lvl (test code = Creatinine 0.84         0.50-1.40      

           



             Lvl)                                                



[place] [address]-05-07 09:36:00





             Test Item    Value        Reference Range Interpretation Comments

 

             Sodium Lvl (test code = Sodium Lvl) 142          135-145           

        



[place] 892398-05-07 09:36:00





             Test Item    Value        Reference Range Interpretation Comments

 

             Glucose Lvl (test code = Glucose Lvl) 99           70-99           

          



[place] 892398-05-07 09:36:00





             Test Item    Value        Reference Range Interpretation Comments

 

             BUN (test code = BUN) 14           7-22                      



[place] 892398-05-07 09:36:00





             Test Item    Value        Reference Range Interpretation Comments

 

             Alk Phos (test code = Alk Phos) 79                           

    



[place] 892398-05-07 09:36:00





             Test Item    Value        Reference Range Interpretation Comments

 

             Bili Total (test code = Bili Total) 0.3          0.2-1.3           

        



[place] 892398-05-07 09:36:00





             Test Item    Value        Reference Range Interpretation Comments

 

             AST (test code = AST) 14           See_Comment                [Auto

mated message] The



                                                                 system which ge

nerated this



                                                                 result transmit

huma



                                                                 reference range

: <=37. The



                                                                 reference range

 was not



                                                                 used to interpr

et this



                                                                 result as zayda

l/abnormal.



[place] 892398-05-07 09:36:00





             Test Item    Value        Reference Range Interpretation Comments

 

             ALT (test code = ALT) 43           See_Comment                [Auto

mated message] The



                                                                 system which ge

nerated this



                                                                 result transmit

huma



                                                                 reference range

: <=65. The



                                                                 reference range

 was not



                                                                 used to interpr

et this



                                                                 result as zayda

l/abnormal.



[facility]2018-05-07 09:36:00





             Test Item    Value        Reference Range Interpretation Comments

 

             Total Protein (test code = Total 7.1          6.4-8.4              

     



             Protein)                                            



[facility]2018-05-07 09:36:00





             Test Item    Value        Reference Range Interpretation Comments

 

             Albumin Lvl (test code = Albumin Lvl) 2.7          3.5-5.0         

          



[facility]2018-05-07 09:36:00





             Test Item    Value        Reference Range Interpretation Comments

 

             Calcium Lvl (test code = Calcium Lvl) 9.2          8.5-10.5        

          



[facility]2018-05-07 09:36:00





             Test Item    Value        Reference Range Interpretation Comments

 

             CO2 (test code = CO2) 21           24-32                     



[facility]2018-05-07 09:36:00





             Test Item    Value        Reference Range Interpretation Comments

 

             Potassium Lvl (test code = Potassium 4.3          3.5-5.1          

         



             Lvl)                                                



[facility]2018-05-07 09:36:00





             Test Item    Value        Reference Range Interpretation Comments

 

             Chloride Lvl (test code = Chloride Lvl) 114                  

            



[facility] BrookGyldoylOVKJHINWUN3679-83-13 09:36:00





             Test Item    Value        Reference Range Interpretation Comments

 

             MCHC (test code = MCHC) 32.5         32.0-36.0                 



[facility] BrookFtvmficRLUSIEKDGX7868-86-00 09:36:00





             Test Item    Value        Reference Range Interpretation Comments

 

             RDW (test code = RDW) 17.5         11.5-14.5                 



[facility] BrookKvujakqNNJEGEFESN6987-89-71 09:36:00





             Test Item    Value        Reference Range Interpretation Comments

 

             Platelet (test code = Platelet) 400          133-450               

    



[facility] BrookSbqcgynHLHQDHTHWX3150-50-29 09:36:00





             Test Item    Value        Reference Range Interpretation Comments

 

             MPV (test code = MPV) 8.5          7.4-10.4                  



[facility] BrookGgwufpkYPAKKPTDOF2817-61-27 09:36:00





             Test Item    Value        Reference Range Interpretation Comments

 

             WBC (test code = WBC) 6.6          3.7-10.4                  



[facility] BrookEwwvddxHIZIUYJECI6512-93-01 09:36:00





             Test Item    Value        Reference Range Interpretation Comments

 

             RBC (test code = RBC) 5.17         4.70-6.10                 



[place] 526068-05-07 09:36:00





             Test Item    Value        Reference Range Interpretation Comments

 

             MCV (test code = MCV) 86.1         80.0-94.0                 



[facility] BrookNpaslsnTGURMKIHCA4326-89-47 09:36:00





             Test Item    Value        Reference Range Interpretation Comments

 

             Hct (test code = Hct) 44.5         42.0-54.0                 



[facility] BrookTbbmxqqISROGOJFFQ3989-04-81 09:36:00





             Test Item    Value        Reference Range Interpretation Comments

 

             MCH (test code = MCH) 28.0 pg      27.0-31.0                 



[facility] BrookTmsywdbEOZHINHPVZ0347-26-80 09:36:00





             Test Item    Value        Reference Range Interpretation Comments

 

             Hgb (test code = Hgb) 14.5         14.0-18.0                 



[facility] BrookDhelwwfOYABXNHZCS3947-92-42 09:36:00





             Test Item    Value        Reference Range Interpretation Comments

 

             Lymphocytes # (test code = Lymphocytes 1.7          1.0-5.5        

           



             #)                                                  



[facility] BrookNqoabbnNVJVOBPOVA0157-51-25 09:36:00





             Test Item    Value        Reference Range Interpretation Comments

 

             Monocytes # (test code 0.7          See_Comment                [Aut

omated message] The



             = Monocytes #)                                        system which 

generated



                                                                 this result tra

nsmitted



                                                                 reference range

: <=0.8.



                                                                 The reference r

zhen was



                                                                 not used to int

erpret



                                                                 this result as



                                                                 normal/abnormal

.



[facility] BrookXovxnzxAAJGMCSUUV4207-79-69 09:36:00





             Test Item    Value        Reference Range Interpretation Comments

 

             Eosinophils # (test code 0.2          See_Comment                [A

utomated message] The



             = Eosinophils #)                                        system whic

h generated



                                                                 this result tra

nsmitted



                                                                 reference range

: <=0.5.



                                                                 The reference r

zhen was



                                                                 not used to int

erpret



                                                                 this result as



                                                                 normal/abnormal

.



[facility] BrookJmbhnmbNBBCOHOWTM7948-00-35 09:36:00





             Test Item    Value        Reference Range Interpretation Comments

 

             Lymphocytes (test code = Lymphocytes) 26.1         20.0-40.0       

          



[facility] BrookUusfsarUKCGXSFYAG9366-66-22 09:36:00





             Test Item    Value        Reference Range Interpretation Comments

 

             Segs (test code = Segs) 59.3         45.0-75.0                 



[facility] BrookJlfarolGAZQDULCPM1662-19-26 09:36:00





             Test Item    Value        Reference Range Interpretation Comments

 

             Basophils (test code = 0.8          See_Comment                [Aut

omated message] The



             Basophils)                                          system which ge

nerated



                                                                 this result tra

nsmitted



                                                                 reference range

: <=1.0.



                                                                 The reference r

zhen was



                                                                 not used to int

erpret



                                                                 this result as



                                                                 normal/abnormal

.



[facility] BrookVcsplnsLKZVKQASRR2948-82-82 09:36:00





             Test Item    Value        Reference Range Interpretation Comments

 

             Monocytes (test code = Monocytes) 10.5         2.0-12.0            

      



[place] 526068-05-07 09:36:00





             Test Item    Value        Reference Range Interpretation Comments

 

             Eosinophils (test code = 3.3          See_Comment                [A

utomated message] The



             Eosinophils)                                        system which ge

nerated



                                                                 this result tra

nsmitted



                                                                 reference range

: <=4.0.



                                                                 The reference r

zhen was



                                                                 not used to int

erpret



                                                                 this result as



                                                                 normal/abnormal

.



[place] 526068-05-07 09:36:00





             Test Item    Value        Reference Range Interpretation Comments

 

             Segs-Bands # (test code = Segs-Bands #) 3.9          1.5-8.1       

            



[place] 892398-05-07 09:36:00





             Test Item    Value        Reference Range Interpretation Comments

 

             Globulin (test code = Globulin) 4.4          2.7-4.2               

    



[place] 892398-05-07 09:36:00





             Test Item    Value        Reference Range Interpretation Comments

 

             A/G Ratio (test code = A/G Ratio) 0.6 1        0.7-1.6             

      



[place] 892398-05-07 09:36:00





             Test Item    Value        Reference Range Interpretation Comments

 

             B/C Ratio (test code = B/C Ratio) 17 1         6-25                

      



[facility]2018-05-07 09:36:00





             Test Item    Value        Reference Range Interpretation Comments

 

             AGAP (test code = AGAP) 11.3         10.0-20.0                 



[place] 892398-05-07 09:36:00





             Test Item    Value        Reference Range Interpretation Comments

 

             eGFR (test code = eGFR) 134                                    



[place] 892398-05-07 09:36:00





             Test Item    Value        Reference Range Interpretation Comments

 

             Creatinine Lvl (test code = Creatinine 0.84         0.50-1.40      

           



             Lvl)                                                



[place] 892398-05-07 09:36:00





             Test Item    Value        Reference Range Interpretation Comments

 

             Sodium Lvl (test code = Sodium Lvl) 142          135-145           

        



[facility]2018-05-07 09:36:00





             Test Item    Value        Reference Range Interpretation Comments

 

             Glucose Lvl (test code = Glucose Lvl) 99           70-99           

          



[facility]2018-05-07 09:36:00





             Test Item    Value        Reference Range Interpretation Comments

 

             BUN (test code = BUN) 14           7-22                      



[facility]2018-05-07 09:36:00





             Test Item    Value        Reference Range Interpretation Comments

 

             Alk Phos (test code = Alk Phos) 79                           

    



[facility]2018-05-07 09:36:00





             Test Item    Value        Reference Range Interpretation Comments

 

             Bili Total (test code = Bili Total) 0.3          0.2-1.3           

        



[facility]2018-05-07 09:36:00





             Test Item    Value        Reference Range Interpretation Comments

 

             AST (test code = AST) 14           See_Comment                [Auto

mated message] The



                                                                 system which ge

nerated this



                                                                 result transmit

huma



                                                                 reference range

: <=37. The



                                                                 reference range

 was not



                                                                 used to interpr

et this



                                                                 result as zayda

l/abnormal.



[facility]2018-05-07 09:36:00





             Test Item    Value        Reference Range Interpretation Comments

 

             ALT (test code = ALT) 43           See_Comment                [Auto

mated message] The



                                                                 system which ge

nerated this



                                                                 result transmit

huma



                                                                 reference range

: <=65. The



                                                                 reference range

 was not



                                                                 used to interpr

et this



                                                                 result as zayda

l/abnormal.



[facility]2018-05-07 09:36:00





             Test Item    Value        Reference Range Interpretation Comments

 

             Total Protein (test code = Total 7.1          6.4-8.4              

     



             Protein)                                            



[facility]2018-05-07 09:36:00





             Test Item    Value        Reference Range Interpretation Comments

 

             Albumin Lvl (test code = Albumin Lvl) 2.7          3.5-5.0         

          



[place] 892398-05-07 09:36:00





             Test Item    Value        Reference Range Interpretation Comments

 

             Calcium Lvl (test code = Calcium Lvl) 9.2          8.5-10.5        

          



[place] 892398-05-07 09:36:00





             Test Item    Value        Reference Range Interpretation Comments

 

             CO2 (test code = CO2) 21           24-32                     



[place] 892398-05-07 09:36:00





             Test Item    Value        Reference Range Interpretation Comments

 

             Potassium Lvl (test code = Potassium 4.3          3.5-5.1          

         



             Lvl)                                                



[place] [address]-05-07 09:36:00





             Test Item    Value        Reference Range Interpretation Comments

 

             Chloride Lvl (test code = Chloride Lvl) 114                  

            



[facility] BrookVknlsvwQBVVCFGYZP9625-61-33 09:36:00





             Test Item    Value        Reference Range Interpretation Comments

 

             MCHC (test code = MCHC) 32.5         32.0-36.0                 



[facility] BrookWwwrxrvNFGYWJWBAU2432-57-06 09:36:00





             Test Item    Value        Reference Range Interpretation Comments

 

             RDW (test code = RDW) 17.5         11.5-14.5                 



[facility] BrookPvrtcsqKZGKTCTWPE3705-41-59 09:36:00





             Test Item    Value        Reference Range Interpretation Comments

 

             Platelet (test code = Platelet) 400          133-450               

    



[facility] BrookPprrinmZUHAPJNFJA3377-93-61 09:36:00





             Test Item    Value        Reference Range Interpretation Comments

 

             MPV (test code = MPV) 8.5          7.4-10.4                  



[facility] BrookQpcibmpBEFEXQJOGC9138-24-93 09:36:00





             Test Item    Value        Reference Range Interpretation Comments

 

             WBC (test code = WBC) 6.6          3.7-10.4                  



[facility] BrookZesuquiVZJOHGUOPD7715-46-15 09:36:00





             Test Item    Value        Reference Range Interpretation Comments

 

             RBC (test code = RBC) 5.17         4.70-6.10                 



[facility] BrookCactgbeGJOKPMBOKT6185-29-97 09:36:00





             Test Item    Value        Reference Range Interpretation Comments

 

             MCV (test code = MCV) 86.1         80.0-94.0                 



[facility] BrookDlixfpgZHZCSETKXR0766-74-47 09:36:00





             Test Item    Value        Reference Range Interpretation Comments

 

             Hct (test code = Hct) 44.5         42.0-54.0                 



[facility] BrookYvqupelNAXVOEHJEP9777-20-31 09:36:00





             Test Item    Value        Reference Range Interpretation Comments

 

             MCH (test code = MCH) 28.0 pg      27.0-31.0                 



[facility] BrookNkzahcmLKBJXBQXTK6478-49-65 09:36:00





             Test Item    Value        Reference Range Interpretation Comments

 

             Hgb (test code = Hgb) 14.5         14.0-18.0                 



[facility] BrookNjgiluyDZUMBGCMAH3342-38-06 09:36:00





             Test Item    Value        Reference Range Interpretation Comments

 

             Lymphocytes # (test code = Lymphocytes 1.7          1.0-5.5        

           



             #)                                                  



[facility] BrookFcoqulbHETYQZNLPI6159-36-76 09:36:00





             Test Item    Value        Reference Range Interpretation Comments

 

             Monocytes # (test code 0.7          See_Comment                [Aut

omated message] The



             = Monocytes #)                                        system which 

generated



                                                                 this result tra

nsmitted



                                                                 reference range

: <=0.8.



                                                                 The reference r

zhen was



                                                                 not used to int

erpret



                                                                 this result as



                                                                 normal/abnormal

.



[facility] BrookJtawbleUGJWODJQXP2577-29-63 09:36:00





             Test Item    Value        Reference Range Interpretation Comments

 

             Eosinophils # (test code 0.2          See_Comment                [A

utomated message] The



             = Eosinophils #)                                        system whic

h generated



                                                                 this result tra

nsmitted



                                                                 reference range

: <=0.5.



                                                                 The reference r

zhen was



                                                                 not used to int

erpret



                                                                 this result as



                                                                 normal/abnormal

.



[facility]





             Test Item    Value        Reference Range Interpretation Comments

 

             Lymphocytes (test code = Lymphocytes) 26.1         20.0-40.0       

          



[facility] BrookUktjuzjAYTOMVQKQE8740-97-30 09:36:00





             Test Item    Value        Reference Range Interpretation Comments

 

             Segs (test code = Segs) 59.3         45.0-75.0                 



[facility] BrookJkuqqcyVFPYVWDATM3267-79-78 09:36:00





             Test Item    Value        Reference Range Interpretation Comments

 

             Basophils (test code = 0.8          See_Comment                [Aut

omated message] The



             Basophils)                                          system which ge

nerated



                                                                 this result tra

nsmitted



                                                                 reference range

: <=1.0.



                                                                 The reference r

zhen was



                                                                 not used to int

erpret



                                                                 this result as



                                                                 normal/abnormal

.



[facility] BrookHkoiwtcCJEVJZYSQH7272-17-74 09:36:00





             Test Item    Value        Reference Range Interpretation Comments

 

             Monocytes (test code = Monocytes) 10.5         2.0-12.0            

      



[facility] BrookPxdncsfAKLKZICIYW7698-48-99 09:36:00





             Test Item    Value        Reference Range Interpretation Comments

 

             Eosinophils (test code = 3.3          See_Comment                [A

utomated message] The



             Eosinophils)                                        system which ge

nerated



                                                                 this result tra

nsmitted



                                                                 reference range

: <=4.0.



                                                                 The reference r

zhen was



                                                                 not used to int

erpret



                                                                 this result as



                                                                 normal/abnormal

.



[facility] BrookWhwwnivWBMTTDAPGX9762-55-94 09:36:00





             Test Item    Value        Reference Range Interpretation Comments

 

             Segs-Bands # (test code = Segs-Bands #) 3.9          1.5-8.1       

            



[facility]21[facility]





             Test Item    Value        Reference Range Interpretation Comments

 

             Vanco Tr TND (test code = Vanco Tr 15:30pm                         

       



             TND)                                                



[facility] NhoxpikQCUGVTFCLU4908-13-77 21:02:00





             Test Item    Value        Reference Range Interpretation Comments

 

             Vanco Tr (test code = Vanco Tr) 9.1                                

    



[facility] OxbijdxQHLURTMFMT5103-06-94 21:02:00





             Test Item    Value        Reference Range Interpretation Comments

 

             Vanco Tr TND (test code = Vanco Tr 15:30pm                         

       



             TND)                                                



[facility]250[facility]00





             Test Item    Value        Reference Range Interpretation Comments

 

             Vanco Tr (test code = Vanco Tr) 9.1                                

    



[facility] QyachubZWVJCHYYYB6657-68-88 21:02:00





             Test Item    Value        Reference Range Interpretation Comments

 

             Vanco Tr TND (test code = Vanco Tr 15:30pm                         

       



             TND)                                                



[facility]AnkdwivQDJZOHCNGW3927-34-74 21:02:00





             Test Item    Value        Reference Range Interpretation Comments

 

             Vanco Tr (test code = Vanco Tr) 9.1                                

    



[facility] MkkvppnLGYDWGDZCI9433-46-52 21:02:00





             Test Item    Value        Reference Range Interpretation Comments

 

             Vanco Tr TND (test code = Vanco Tr 15:30pm                         

       



             TND)                                                



[facility]NuaekdzYDPGEDXKOG8444-04-21 21:02:00





             Test Item    Value        Reference Range Interpretation Comments

 

             Vanco Tr (test code = Vanco Tr) 9.1                                

    



[facility] HospitalOgabhqmBIHUPWKUBA1958-06-64 21:02:00





             Test Item    Value        Reference Range Interpretation Comments

 

             Vanco Tr TND (test code = Vanco Tr 15:30pm                         

       



             TND)                                                



[facility] HospitalYvtmepqANPVSFZZHA5172-45-10 21:02:00





             Test Item    Value        Reference Range Interpretation Comments

 

             Vanco Tr (test code = Vanco Tr) 9.1                                

    



[facility] IylyvxlEALKKULSVY9333-16-05 21:02:00





             Test Item    Value        Reference Range Interpretation Comments

 

             Vanco Tr TND (test code = Vanco Tr 15:30pm                         

       



             TND)                                                



[facility] HospitalPhqdzcyADQDAFPICB4227-33-66 21:02:00





             Test Item    Value        Reference Range Interpretation Comments

 

             Vanco Tr (test code = Vanco Tr) 9.1                                

    



[facility] AEWGB2217-12-84 07:07:00





             Test Item    Value        Reference Range Interpretation Comments

 

             Glucose Lvl (test code = Glucose Lvl) 74           70-99           

          



[place] 892398-05-06 07:07:00





             Test Item    Value        Reference Range Interpretation Comments

 

             BUN (test code = BUN) 12           7-22                      



[place] 892398-05-06 07:07:00





             Test Item    Value        Reference Range Interpretation Comments

 

             Creatinine Lvl (test code = Creatinine 0.75         0.50-1.40      

           



             Lvl)                                                



[place] 892398-05-06 07:07:00





             Test Item    Value        Reference Range Interpretation Comments

 

             eGFR (test code = eGFR) 140                                    



[place] 892398-05-06 07:07:00





             Test Item    Value        Reference Range Interpretation Comments

 

             Albumin Lvl (test code = Albumin Lvl) 2.9          3.5-5.0         

          



[place] 892398-05-06 07:07:00





             Test Item    Value        Reference Range Interpretation Comments

 

             Globulin (test code = Globulin) 4.4          2.7-4.2               

    



[place] 892398-05-06 07:07:00





             Test Item    Value        Reference Range Interpretation Comments

 

             A/G Ratio (test code = A/G Ratio) 0.7 1        0.7-1.6             

      



[place] [address]-05-06 07:07:00





             Test Item    Value        Reference Range Interpretation Comments

 

             Bili Total (test code = Bili Total) 0.4          0.2-1.3           

        



[place] 892398-05-06 07:07:00





             Test Item    Value        Reference Range Interpretation Comments

 

             Alk Phos (test code = Alk Phos) 71                           

    



[place] 892398-05-06 07:07:00





             Test Item    Value        Reference Range Interpretation Comments

 

             AST (test code = AST) 15           See_Comment                [Auto

mated message] The



                                                                 system which ge

nerated this



                                                                 result transmit

huma



                                                                 reference range

: <=37. The



                                                                 reference range

 was not



                                                                 used to interpr

et this



                                                                 result as zayda

l/abnormal.



[place] 892398-05-06 07:07:00





             Test Item    Value        Reference Range Interpretation Comments

 

             ALT (test code = ALT) 28           See_Comment                [Auto

mated message] The



                                                                 system which ge

nerated this



                                                                 result transmit

huma



                                                                 reference range

: <=65. The



                                                                 reference range

 was not



                                                                 used to interpr

et this



                                                                 result as zayda

l/abnormal.



[address]05-06 07:07:00





             Test Item    Value        Reference Range Interpretation Comments

 

             Potassium Lvl (test code = Potassium 4.4          3.5-5.1          

         



             Lvl)                                                



[facility]2018-05-06 07:07:00





             Test Item    Value        Reference Range Interpretation Comments

 

             Sodium Lvl (test code = Sodium Lvl) 147          135-145           

        



[place] 892398-05-06 07:07:00





             Test Item    Value        Reference Range Interpretation Comments

 

             CO2 (test code = CO2) 25           24-32                     



[place] 892398-05-06 07:07:00





             Test Item    Value        Reference Range Interpretation Comments

 

             Chloride Lvl (test code = Chloride Lvl) 114                  

            



[place] 892398-05-06 07:07:00





             Test Item    Value        Reference Range Interpretation Comments

 

             B/C Ratio (test code = B/C Ratio) 16 1         6-25                

      



[place] 892398-05-06 07:07:00





             Test Item    Value        Reference Range Interpretation Comments

 

             Calcium Lvl (test code = Calcium Lvl) 8.7          8.5-10.5        

          



[facility]2018-05-06 07:07:00





             Test Item    Value        Reference Range Interpretation Comments

 

             AGAP (test code = AGAP) 12.4         10.0-20.0                 



[place] 892398-05-06 07:07:00





             Test Item    Value        Reference Range Interpretation Comments

 

             Total Protein (test code = Total 7.3          6.4-8.4              

     



             Protein)                                            



[facility]UzltkzjCUNFQFOOMO3959-12-84 07:07:00





             Test Item    Value        Reference Range Interpretation Comments

 

             Platelet (test code = Platelet) 345          133-450               

    



[facility] BrookXdguljmFNNPKGCJAN8507-02-68 07:07:00





             Test Item    Value        Reference Range Interpretation Comments

 

             MPV (test code = MPV) 8.7          7.4-10.4                  



[place] 526068-05-06 07:07:00





             Test Item    Value        Reference Range Interpretation Comments

 

             WBC (test code = WBC) 6.9          3.7-10.4                  



[facility] BrookWiadmugFIWNXGKHXO8202-01-19 07:07:00





             Test Item    Value        Reference Range Interpretation Comments

 

             RBC (test code = RBC) 5.30         4.70-6.10                 



[place] 526068-05-06 07:07:00





             Test Item    Value        Reference Range Interpretation Comments

 

             MCHC (test code = MCHC) 32.8         32.0-36.0                 



[facility] BrookPgjqgnvGRZWUNVFJJ1701-95-12 07:07:00





             Test Item    Value        Reference Range Interpretation Comments

 

             MCH (test code = MCH) 27.9 pg      27.0-31.0                 



[facility] BrookXcvuwonFGZPPFKRLZ1006-85-93 07:07:00





             Test Item    Value        Reference Range Interpretation Comments

 

             Hgb (test code = Hgb) 14.8         14.0-18.0                 



[facility] BrookZqlsyclLBIYFERRBX6636-45-78 07:07:00





             Test Item    Value        Reference Range Interpretation Comments

 

             MCV (test code = MCV) 85.0         80.0-94.0                 



[facility] BrookWzqghzsKFWYVEYSNN1911-90-03 07:07:00





             Test Item    Value        Reference Range Interpretation Comments

 

             Hct (test code = Hct) 45.1         42.0-54.0                 



[facility] BrookOatgkcrEYRJQOUHQU6930-44-05 07:07:00





             Test Item    Value        Reference Range Interpretation Comments

 

             RDW (test code = RDW) 17.5         11.5-14.5                 



[facility] BrookFpdyfxyTTYRICHQRU8216-21-06 07:07:00





             Test Item    Value        Reference Range Interpretation Comments

 

             Eosinophils # (test code 0.2          See_Comment                [A

utomated message] The



             = Eosinophils #)                                        system whic

h generated



                                                                 this result tra

nsmitted



                                                                 reference range

: <=0.5.



                                                                 The reference r

zhen was



                                                                 not used to int

erpret



                                                                 this result as



                                                                 normal/abnormal

.



[facility] BrookKaigemfNRRBRWSWDV6733-13-27 07:07:00





             Test Item    Value        Reference Range Interpretation Comments

 

             Lymphocytes # (test code = Lymphocytes 2.0          1.0-5.5        

           



             #)                                                  



[facility] BrookRiqalniSGPUPLLNAF8665-68-03 07:07:00





             Test Item    Value        Reference Range Interpretation Comments

 

             Monocytes # (test code 0.7          See_Comment                [Aut

omated message] The



             = Monocytes #)                                        system which 

generated



                                                                 this result tra

nsmitted



                                                                 reference range

: <=0.8.



                                                                 The reference r

zhen was



                                                                 not used to int

erpret



                                                                 this result as



                                                                 normal/abnormal

.



[facility] BrookDvudiqbPUJCCZXGJD4857-09-87 07:07:00





             Test Item    Value        Reference Range Interpretation Comments

 

             Eosinophils (test code = 2.4          See_Comment                [A

utomated message] The



             Eosinophils)                                        system which ge

nerated



                                                                 this result tra

nsmitted



                                                                 reference range

: <=4.0.



                                                                 The reference r

zhen was



                                                                 not used to int

erpret



                                                                 this result as



                                                                 normal/abnormal

.



[facility] BrookZdnyzigUYGUEXDTOM0978-29-87 07:07:00





             Test Item    Value        Reference Range Interpretation Comments

 

             Basophils (test code = 0.6          See_Comment                [Aut

omated message] The



             Basophils)                                          system which ge

nerated



                                                                 this result tra

nsmitted



                                                                 reference range

: <=1.0.



                                                                 The reference r

zhen was



                                                                 not used to int

erpret



                                                                 this result as



                                                                 normal/abnormal

.



[facility] BrookZwvgdaqCTUINQHTXO1190-82-90 07:07:00





             Test Item    Value        Reference Range Interpretation Comments

 

             Segs-Bands # (test code = Segs-Bands #) 4.0          1.5-8.1       

            



[facility] BrookRahwccyZLNOAMONAI8386-26-09 07:07:00





             Test Item    Value        Reference Range Interpretation Comments

 

             Monocytes (test code = Monocytes) 10.4         2.0-12.0            

      



[facility] BrookCfqmmqhWCRLIBGRKS6313-92-59 07:07:00





             Test Item    Value        Reference Range Interpretation Comments

 

             RBC Morph (test code = Normal (18 2:07 AM)                     

      



             RBC Morph)                                          



[facility] BrookUgoeozwBAGTCGZPWN8389-10-73 07:07:00





             Test Item    Value        Reference Range Interpretation Comments

 

             Segs (test code = Segs) 58.1         45.0-75.0                 



[facility] BrookAzifnmqTWEHUQZPYN3669-56-70 07:07:00





             Test Item    Value        Reference Range Interpretation Comments

 

             Plt Morph (test code = Normal (18 2:07 AM)                     

      



             Plt Morph)                                          



[facility] BrookPojeihdKXEWHXAASF2713-37-80 07:07:00





             Test Item    Value        Reference Range Interpretation Comments

 

             Lymphocytes (test code = Lymphocytes) 28.5         20.0-40.0       

          



[facility]2018-05-06 07:07:00





             Test Item    Value        Reference Range Interpretation Comments

 

             Glucose Lvl (test code = Glucose Lvl) 74           70-99           

          



[facility]2018-05-06 07:07:00





             Test Item    Value        Reference Range Interpretation Comments

 

             BUN (test code = BUN) 12           7-22                      



[facility]2018-05-06 07:07:00





             Test Item    Value        Reference Range Interpretation Comments

 

             Creatinine Lvl (test code = Creatinine 0.75         0.50-1.40      

           



             Lvl)                                                



[place] 892398-05-06 07:07:00





             Test Item    Value        Reference Range Interpretation Comments

 

             eGFR (test code = eGFR) 140                                    



[facility]2018-05-06 07:07:00





             Test Item    Value        Reference Range Interpretation Comments

 

             Albumin Lvl (test code = Albumin Lvl) 2.9          3.5-5.0         

          



[place] 892398-05-06 07:07:00





             Test Item    Value        Reference Range Interpretation Comments

 

             Globulin (test code = Globulin) 4.4          2.7-4.2               

    



[place] 892398-05-06 07:07:00





             Test Item    Value        Reference Range Interpretation Comments

 

             A/G Ratio (test code = A/G Ratio) 0.7 1        0.7-1.6             

      



[place] [address]-05-06 07:07:00





             Test Item    Value        Reference Range Interpretation Comments

 

             Bili Total (test code = Bili Total) 0.4          0.2-1.3           

        



[place] [address]2398-05-06 07:07:00





             Test Item    Value        Reference Range Interpretation Comments

 

             Alk Phos (test code = Alk Phos) 71                           

    



[place] 892398-05-06 07:07:00





             Test Item    Value        Reference Range Interpretation Comments

 

             AST (test code = AST) 15           See_Comment                [Auto

mated message] The



                                                                 system which ge

nerated this



                                                                 result transmit

huma



                                                                 reference range

: <=37. The



                                                                 reference range

 was not



                                                                 used to interpr

et this



                                                                 result as zayda

l/abnormal.



[place] [address]2398-05-06 07:07:00





             Test Item    Value        Reference Range Interpretation Comments

 

             ALT (test code = ALT) 28           See_Comment                [Auto

mated message] The



                                                                 system which ge

nerated this



                                                                 result transmit

huma



                                                                 reference range

: <=65. The



                                                                 reference range

 was not



                                                                 used to interpr

et this



                                                                 result as zayda

l/abnormal.



[place] 892398-05-06 07:07:00





             Test Item    Value        Reference Range Interpretation Comments

 

             Potassium Lvl (test code = Potassium 4.4          3.5-5.1          

         



             Lvl)                                                



[place] [address]2398-05-06 07:07:00





             Test Item    Value        Reference Range Interpretation Comments

 

             Sodium Lvl (test code = Sodium Lvl) 147          135-145           

        



[place] 892398-05-06 07:07:00





             Test Item    Value        Reference Range Interpretation Comments

 

             CO2 (test code = CO2) 25           24-32                     



[place] 892398-05-06 07:07:00





             Test Item    Value        Reference Range Interpretation Comments

 

             Chloride Lvl (test code = Chloride Lvl) 114                  

            



[facility]2018-05-06 07:07:00





             Test Item    Value        Reference Range Interpretation Comments

 

             B/C Ratio (test code = B/C Ratio) 16 1         6-25                

      



[facility]2018-05-06 07:07:00





             Test Item    Value        Reference Range Interpretation Comments

 

             Calcium Lvl (test code = Calcium Lvl) 8.7          8.5-10.5        

          



[facility]2018-05-06 07:07:00





             Test Item    Value        Reference Range Interpretation Comments

 

             AGAP (test code = AGAP) 12.4         10.0-20.0                 



[facility]2018-05-06 07:07:00





             Test Item    Value        Reference Range Interpretation Comments

 

             Total Protein (test code = Total 7.3          6.4-8.4              

     



             Protein)                                            



[facility]69[facility]





             Test Item    Value        Reference Range Interpretation Comments

 

             Platelet (test code = Platelet) 345          133-450               

    



[facility] BrookTgtkczrCXZNTRVCJL9686-54-91 07:07:00





             Test Item    Value        Reference Range Interpretation Comments

 

             MPV (test code = MPV) 8.7          7.4-10.4                  



[facility] BrookIabxvduXTANOEOFOG0335-18-40 07:07:00





             Test Item    Value        Reference Range Interpretation Comments

 

             WBC (test code = WBC) 6.9          3.7-10.4                  



[facility] BrookOszjkfqVJKGUFPRNP6847-61-67 07:07:00





             Test Item    Value        Reference Range Interpretation Comments

 

             RBC (test code = RBC) 5.30         4.70-6.10                 



[facility] BrookMmcajxzSBTIRGUNHT5672-71-00 07:07:00





             Test Item    Value        Reference Range Interpretation Comments

 

             MCHC (test code = MCHC) 32.8         32.0-36.0                 



[facility] BrookIlnjbpwZGQDVFJTMR8084-55-37 07:07:00





             Test Item    Value        Reference Range Interpretation Comments

 

             MCH (test code = MCH) 27.9 pg      27.0-31.0                 



[facility] BrookUxezszoOVZUJMYZJS3100-25-10 07:07:00





             Test Item    Value        Reference Range Interpretation Comments

 

             Hgb (test code = Hgb) 14.8         14.0-18.0                 



[facility] BrookXujighgVEUQPEIUXP4571-16-26 07:07:00





             Test Item    Value        Reference Range Interpretation Comments

 

             MCV (test code = MCV) 85.0         80.0-94.0                 



[facility] BrookWtdoydzRIXYDNPCPU8142-45-05 07:07:00





             Test Item    Value        Reference Range Interpretation Comments

 

             Hct (test code = Hct) 45.1         42.0-54.0                 



[facility] BrookFlzycadEWGYXODNUB4814-56-69 07:07:00





             Test Item    Value        Reference Range Interpretation Comments

 

             RDW (test code = RDW) 17.5         11.5-14.5                 



[facility] BrookLmvxtoaDZTKBUXBJM6256-95-03 07:07:00





             Test Item    Value        Reference Range Interpretation Comments

 

             Eosinophils # (test code 0.2          See_Comment                [A

utomated message] The



             = Eosinophils #)                                        system whic

h generated



                                                                 this result tra

nsmitted



                                                                 reference range

: <=0.5.



                                                                 The reference r

zhen was



                                                                 not used to int

erpret



                                                                 this result as



                                                                 normal/abnormal

.



[facility] BrookYtsbqkmZUAVXQRNRV9299-68-96 07:07:00





             Test Item    Value        Reference Range Interpretation Comments

 

             Lymphocytes # (test code = Lymphocytes 2.0          1.0-5.5        

           



             #)                                                  



[facility] BrookMjtimpqABIHKMPHQB2073-37-87 07:07:00





             Test Item    Value        Reference Range Interpretation Comments

 

             Monocytes # (test code 0.7          See_Comment                [Aut

omated message] The



             = Monocytes #)                                        system which 

generated



                                                                 this result tra

nsmitted



                                                                 reference range

: <=0.8.



                                                                 The reference r

zhen was



                                                                 not used to int

erpret



                                                                 this result as



                                                                 normal/abnormal

.



[facility] BrookZbrejhqDFXFPFZRYO6344-92-19 07:07:00





             Test Item    Value        Reference Range Interpretation Comments

 

             Eosinophils (test code = 2.4          See_Comment                [A

utomated message] The



             Eosinophils)                                        system which ge

nerated



                                                                 this result tra

nsmitted



                                                                 reference range

: <=4.0.



                                                                 The reference r

zhen was



                                                                 not used to int

erpret



                                                                 this result as



                                                                 normal/abnormal

.



[facility] BrookRxvjiwhWUZBCQLNBL2700-48-76 07:07:00





             Test Item    Value        Reference Range Interpretation Comments

 

             Basophils (test code = 0.6          See_Comment                [Aut

omated message] The



             Basophils)                                          system which ge

nerated



                                                                 this result tra

nsmitted



                                                                 reference range

: <=1.0.



                                                                 The reference r

zhen was



                                                                 not used to int

erpret



                                                                 this result as



                                                                 normal/abnormal

.



[facility] BrookQmzogwpCMUWKXSKRB7849-80-83 07:07:00





             Test Item    Value        Reference Range Interpretation Comments

 

             Segs-Bands # (test code = Segs-Bands #) 4.0          1.5-8.1       

            



[place]6068-05-06 07:07:00





             Test Item    Value        Reference Range Interpretation Comments

 

             Monocytes (test code = Monocytes) 10.4         2.0-12.0            

      



[facility] BrookOjecsvcZOCTIHJGDE8198-96-44 07:07:00





             Test Item    Value        Reference Range Interpretation Comments

 

             RBC Morph (test code = Normal (18 2:07 AM)                     

      



             RBC Morph)                                          



[facility] BrookAkedzyvTLLSNMOOUA4023-75-01 07:07:00





             Test Item    Value        Reference Range Interpretation Comments

 

             Segs (test code = Segs) 58.1         45.0-75.0                 



[place] 526068-05-06 07:07:00





             Test Item    Value        Reference Range Interpretation Comments

 

             Plt Morph (test code = Normal (18 2:07 AM)                     

      



             Plt Morph)                                          



[facility] BrookXjevcqxRHWYXXLTDD0325-34-55 07:07:00





             Test Item    Value        Reference Range Interpretation Comments

 

             Lymphocytes (test code = Lymphocytes) 28.5         20.0-40.0       

          



[facility]2018-05-06 07:07:00





             Test Item    Value        Reference Range Interpretation Comments

 

             Glucose Lvl (test code = Glucose Lvl) 74           70-99           

          



[facility]2018-05-06 07:07:00





             Test Item    Value        Reference Range Interpretation Comments

 

             BUN (test code = BUN) 12           7-22                      



[facility]2018-05-06 07:07:00





             Test Item    Value        Reference Range Interpretation Comments

 

             Creatinine Lvl (test code = Creatinine 0.75         0.50-1.40      

           



             Lvl)                                                



[facility]2018-05-06 07:07:00





             Test Item    Value        Reference Range Interpretation Comments

 

             eGFR (test code = eGFR) 140                                    



[facility]2018-05-06 07:07:00





             Test Item    Value        Reference Range Interpretation Comments

 

             Albumin Lvl (test code = Albumin Lvl) 2.9          3.5-5.0         

          



[facility]2018-05-06 07:07:00





             Test Item    Value        Reference Range Interpretation Comments

 

             Globulin (test code = Globulin) 4.4          2.7-4.2               

    



[place] 892398-05-06 07:07:00





             Test Item    Value        Reference Range Interpretation Comments

 

             A/G Ratio (test code = A/G Ratio) 0.7 1        0.7-1.6             

      



[place] 892398-05-06 07:07:00





             Test Item    Value        Reference Range Interpretation Comments

 

             Bili Total (test code = Bili Total) 0.4          0.2-1.3           

        



[place] 892398-05-06 07:07:00





             Test Item    Value        Reference Range Interpretation Comments

 

             Alk Phos (test code = Alk Phos) 71                           

    



[facility]2018-05-06 07:07:00





             Test Item    Value        Reference Range Interpretation Comments

 

             AST (test code = AST) 15           See_Comment                [Auto

mated message] The



                                                                 system which ge

nerated this



                                                                 result transmit

huma



                                                                 reference range

: <=37. The



                                                                 reference range

 was not



                                                                 used to interpr

et this



                                                                 result as zayda

l/abnormal.



[place] 892398-05-06 07:07:00





             Test Item    Value        Reference Range Interpretation Comments

 

             ALT (test code = ALT) 28           See_Comment                [Auto

mated message] The



                                                                 system which ge

nerated this



                                                                 result transmit

huma



                                                                 reference range

: <=65. The



                                                                 reference range

 was not



                                                                 used to interpr

et this



                                                                 result as zayda

l/abnormal.



[place] 892398-05-06 07:07:00





             Test Item    Value        Reference Range Interpretation Comments

 

             Potassium Lvl (test code = Potassium 4.4          3.5-5.1          

         



             Lvl)                                                



[facility]2018-05-06 07:07:00





             Test Item    Value        Reference Range Interpretation Comments

 

             Sodium Lvl (test code = Sodium Lvl) 147          135-145           

        



[place] 892398-05-06 07:07:00





             Test Item    Value        Reference Range Interpretation Comments

 

             CO2 (test code = CO2) 25           24-32                     



[place] 892398-05-06 07:07:00





             Test Item    Value        Reference Range Interpretation Comments

 

             Chloride Lvl (test code = Chloride Lvl) 114                  

            



[place] 892398-05-06 07:07:00





             Test Item    Value        Reference Range Interpretation Comments

 

             B/C Ratio (test code = B/C Ratio) 16 1         6-25                

      



[place] 892398-05-06 07:07:00





             Test Item    Value        Reference Range Interpretation Comments

 

             Calcium Lvl (test code = Calcium Lvl) 8.7          8.5-10.5        

          



[place] 892398-05-06 07:07:00





             Test Item    Value        Reference Range Interpretation Comments

 

             AGAP (test code = AGAP) 12.4         10.0-20.0                 



[facility]2018-05-06 07:07:00





             Test Item    Value        Reference Range Interpretation Comments

 

             Total Protein (test code = Total 7.3          6.4-8.4              

     



             Protein)                                            



[facility] BrookDspfsxdURERWNMMCW5579-36-07 07:07:00





             Test Item    Value        Reference Range Interpretation Comments

 

             Platelet (test code = Platelet) 345          133-450               

    



[facility] BrookCmfcdsaZYCPZWRRVW5912-72-90 07:07:00





             Test Item    Value        Reference Range Interpretation Comments

 

             MPV (test code = MPV) 8.7          7.4-10.4                  



[place] 526068-05-06 07:07:00





             Test Item    Value        Reference Range Interpretation Comments

 

             WBC (test code = WBC) 6.9          3.7-10.4                  



[facility] BrookQqijbdtFBFGNPTHZW4324-35-11 07:07:00





             Test Item    Value        Reference Range Interpretation Comments

 

             RBC (test code = RBC) 5.30         4.70-6.10                 



[facility] BrookHlomslwFKRCBJEVPE5835-62-29 07:07:00





             Test Item    Value        Reference Range Interpretation Comments

 

             MCHC (test code = MCHC) 32.8         32.0-36.0                 



[facility] BrookVrbbflcDRMMDXYHBB3344-05-10 07:07:00





             Test Item    Value        Reference Range Interpretation Comments

 

             MCH (test code = MCH) 27.9 pg      27.0-31.0                 



[facility] BrookAscfxgzKYUEREGHQC1530-32-13 07:07:00





             Test Item    Value        Reference Range Interpretation Comments

 

             Hgb (test code = Hgb) 14.8         14.0-18.0                 



[facility] BrookJulpdwuIOQXZTFIXP1816-58-33 07:07:00





             Test Item    Value        Reference Range Interpretation Comments

 

             MCV (test code = MCV) 85.0         80.0-94.0                 



[facility] BrookUikhahyGSLYMFGGUO5575-18-86 07:07:00





             Test Item    Value        Reference Range Interpretation Comments

 

             Hct (test code = Hct) 45.1         42.0-54.0                 



[facility] BrookPwmvleuHFRDGQOBWQ7949-40-37 07:07:00





             Test Item    Value        Reference Range Interpretation Comments

 

             RDW (test code = RDW) 17.5         11.5-14.5                 



[facility] BrookLolkhunHXIKEJDKPA9457-02-68 07:07:00





             Test Item    Value        Reference Range Interpretation Comments

 

             Eosinophils # (test code 0.2          See_Comment                [A

utomated message] The



             = Eosinophils #)                                        system whic

h generated



                                                                 this result tra

nsmitted



                                                                 reference range

: <=0.5.



                                                                 The reference r

zhen was



                                                                 not used to int

erpret



                                                                 this result as



                                                                 normal/abnormal

.



[facility] BrookHodqxhePLFIGAHVKG0767-87-25 07:07:00





             Test Item    Value        Reference Range Interpretation Comments

 

             Lymphocytes # (test code = Lymphocytes 2.0          1.0-5.5        

           



             #)                                                  



[facility] BrookSodgtooPXGLRMZLKE6793-39-41 07:07:00





             Test Item    Value        Reference Range Interpretation Comments

 

             Monocytes # (test code 0.7          See_Comment                [Aut

omated message] The



             = Monocytes #)                                        system which 

generated



                                                                 this result tra

nsmitted



                                                                 reference range

: <=0.8.



                                                                 The reference r

zehn was



                                                                 not used to int

erpret



                                                                 this result as



                                                                 normal/abnormal

.



[facility] BrookZhfhbpgQTEGCLFIIB1466-76-49 07:07:00





             Test Item    Value        Reference Range Interpretation Comments

 

             Eosinophils (test code = 2.4          See_Comment                [A

utomated message] The



             Eosinophils)                                        system which ge

nerated



                                                                 this result tra

nsmitted



                                                                 reference range

: <=4.0.



                                                                 The reference r

zhen was



                                                                 not used to int

erpret



                                                                 this result as



                                                                 normal/abnormal

.



[facility] BrookJbyxdgwPBJWQXWVJT9376-82-36 07:07:00





             Test Item    Value        Reference Range Interpretation Comments

 

             Basophils (test code = 0.6          See_Comment                [Aut

omated message] The



             Basophils)                                          system which ge

nerated



                                                                 this result tra

nsmitted



                                                                 reference range

: <=1.0.



                                                                 The reference r

zhen was



                                                                 not used to int

erpret



                                                                 this result as



                                                                 normal/abnormal

.



[facility] BrookCwjhimcASKPGNXFKG9784-02-29 07:07:00





             Test Item    Value        Reference Range Interpretation Comments

 

             Segs-Bands # (test code = Segs-Bands #) 4.0          1.5-8.1       

            



[facility] BrookVbqjqtuGFXYQQQAQV8429-71-12 07:07:00





             Test Item    Value        Reference Range Interpretation Comments

 

             Monocytes (test code = Monocytes) 10.4         2.0-12.0            

      



[facility] BrookRkfhbkmFIIZRSMNJD8113-97-32 07:07:00





             Test Item    Value        Reference Range Interpretation Comments

 

             RBC Morph (test code = Normal (18 2:07 AM)                     

      



             RBC Morph)                                          



[facility] BrookMdrqqvmHCXJFUWRLV9951-39-98 07:07:00





             Test Item    Value        Reference Range Interpretation Comments

 

             Segs (test code = Segs) 58.1         45.0-75.0                 



[facility] BrookPhfvrfyVOLXOCSJRP5130-38-89 07:07:00





             Test Item    Value        Reference Range Interpretation Comments

 

             Plt Morph (test code = Normal (18 2:07 AM)                     

      



             Plt Morph)                                          



[facility] BrookEokglweJGNDFMRBTH9393-90-64 07:07:00





             Test Item    Value        Reference Range Interpretation Comments

 

             Lymphocytes (test code = Lymphocytes) 28.5         20.0-40.0       

          



[place] 892398-05-06 07:07:00





             Test Item    Value        Reference Range Interpretation Comments

 

             Glucose Lvl (test code = Glucose Lvl) 74           70-99           

          



[place] 892398-05-06 07:07:00





             Test Item    Value        Reference Range Interpretation Comments

 

             BUN (test code = BUN) 12           7-22                      



[place] 892398-05-06 07:07:00





             Test Item    Value        Reference Range Interpretation Comments

 

             Creatinine Lvl (test code = Creatinine 0.75         0.50-1.40      

           



             Lvl)                                                



[place] 892398-05-06 07:07:00





             Test Item    Value        Reference Range Interpretation Comments

 

             eGFR (test code = eGFR) 140                                    



[facility]2018-05-06 07:07:00





             Test Item    Value        Reference Range Interpretation Comments

 

             Albumin Lvl (test code = Albumin Lvl) 2.9          3.5-5.0         

          



[place] 892398-05-06 07:07:00





             Test Item    Value        Reference Range Interpretation Comments

 

             Globulin (test code = Globulin) 4.4          2.7-4.2               

    



[place] 892398-05-06 07:07:00





             Test Item    Value        Reference Range Interpretation Comments

 

             A/G Ratio (test code = A/G Ratio) 0.7 1        0.7-1.6             

      



[place] 892398-05-06 07:07:00





             Test Item    Value        Reference Range Interpretation Comments

 

             Bili Total (test code = Bili Total) 0.4          0.2-1.3           

        



[place] 892398-05-06 07:07:00





             Test Item    Value        Reference Range Interpretation Comments

 

             Alk Phos (test code = Alk Phos) 71                           

    



[place] 892398-05-06 07:07:00





             Test Item    Value        Reference Range Interpretation Comments

 

             AST (test code = AST) 15           See_Comment                [Auto

mated message] The



                                                                 system which ge

nerated this



                                                                 result transmit

huma



                                                                 reference range

: <=37. The



                                                                 reference range

 was not



                                                                 used to interpr

et this



                                                                 result as zayda

l/abnormal.



[facility]2018-05-06 07:07:00





             Test Item    Value        Reference Range Interpretation Comments

 

             ALT (test code = ALT) 28           See_Comment                [Auto

mated message] The



                                                                 system which ge

nerated this



                                                                 result transmit

huma



                                                                 reference range

: <=65. The



                                                                 reference range

 was not



                                                                 used to interpr

et this



                                                                 result as zayda

l/abnormal.



[place] 892398-05-06 07:07:00





             Test Item    Value        Reference Range Interpretation Comments

 

             Potassium Lvl (test code = Potassium 4.4          3.5-5.1          

         



             Lvl)                                                



[place] 892398-05-06 07:07:00





             Test Item    Value        Reference Range Interpretation Comments

 

             Sodium Lvl (test code = Sodium Lvl) 147          135-145           

        



[place] 892398-05-06 07:07:00





             Test Item    Value        Reference Range Interpretation Comments

 

             CO2 (test code = CO2) 25           24-32                     



[place] 892398-05-06 07:07:00





             Test Item    Value        Reference Range Interpretation Comments

 

             Chloride Lvl (test code = Chloride Lvl) 114                  

            



[place] 892398-05-06 07:07:00





             Test Item    Value        Reference Range Interpretation Comments

 

             B/C Ratio (test code = B/C Ratio) 16 1         6-25                

      



[place] 892398-05-06 07:07:00





             Test Item    Value        Reference Range Interpretation Comments

 

             Calcium Lvl (test code = Calcium Lvl) 8.7          8.5-10.5        

          



[place] 892398-05-06 07:07:00





             Test Item    Value        Reference Range Interpretation Comments

 

             AGAP (test code = AGAP) 12.4         10.0-20.0                 



[place] 892398-05-06 07:07:00





             Test Item    Value        Reference Range Interpretation Comments

 

             Total Protein (test code = Total 7.3          6.4-8.4              

     



             Protein)                                            



[facility]





             Test Item    Value        Reference Range Interpretation Comments

 

             Platelet (test code = Platelet) 345          133-450               

    



[place] 526068-05-06 07:07:00





             Test Item    Value        Reference Range Interpretation Comments

 

             MPV (test code = MPV) 8.7          7.4-10.4                  



[place] 526068-05-06 07:07:00





             Test Item    Value        Reference Range Interpretation Comments

 

             WBC (test code = WBC) 6.9          3.7-10.4                  



[facility]fvimjqRBKNOEOSSP7782[facility]98[facility]16 [facility]:00





             Test Item    Value        Reference Range Interpretation Comments

 

             RBC (test code = RBC) 5.30         4.70-6.10                 



[facility] BrookHureqwdLJLOWQQBAP3642-19-27 07:07:00





             Test Item    Value        Reference Range Interpretation Comments

 

             MCHC (test code = MCHC) 32.8         32.0-36.0                 



[facility] BrookZxsyrjyWRABNNWQKK5279-69-70 07:07:00





             Test Item    Value        Reference Range Interpretation Comments

 

             MCH (test code = MCH) 27.9 pg      27.0-31.0                 



[facility] BrookJqvkqxeVRNSKJSDVG0557-78-08 07:07:00





             Test Item    Value        Reference Range Interpretation Comments

 

             Hgb (test code = Hgb) 14.8         14.0-18.0                 



[facility] BrookUxwdfkdWPSMXPBZUS7582-68-88 07:07:00





             Test Item    Value        Reference Range Interpretation Comments

 

             MCV (test code = MCV) 85.0         80.0-94.0                 



[facility] BrookSnenaouNLOHHCWUHT6174-52-63 07:07:00





             Test Item    Value        Reference Range Interpretation Comments

 

             Hct (test code = Hct) 45.1         42.0-54.0                 



[facility] BrookTpezxijWDUEFSWPWZ1495-28-77 07:07:00





             Test Item    Value        Reference Range Interpretation Comments

 

             RDW (test code = RDW) 17.5         11.5-14.5                 



[facility] BrookLsgjwqqQCYRQFPQYJ9087-56-57 07:07:00





             Test Item    Value        Reference Range Interpretation Comments

 

             Eosinophils # (test code 0.2          See_Comment                [A

utomated message] The



             = Eosinophils #)                                        system whic

h generated



                                                                 this result tra

nsmitted



                                                                 reference range

: <=0.5.



                                                                 The reference r

zhen was



                                                                 not used to int

erpret



                                                                 this result as



                                                                 normal/abnormal

.



[facility] BrookOfdbkvjIPDNIXBGFN8327-78-90 07:07:00





             Test Item    Value        Reference Range Interpretation Comments

 

             Lymphocytes # (test code = Lymphocytes 2.0          1.0-5.5        

           



             #)                                                  



[facility] BrookOktyqgnSKOJXELAMY4665-49-21 07:07:00





             Test Item    Value        Reference Range Interpretation Comments

 

             Monocytes # (test code 0.7          See_Comment                [Aut

omated message] The



             = Monocytes #)                                        system which 

generated



                                                                 this result tra

nsmitted



                                                                 reference range

: <=0.8.



                                                                 The reference r

zhen was



                                                                 not used to int

erpret



                                                                 this result as



                                                                 normal/abnormal

.



[facility] BrookLexebtcUGPCVCYKYH1053-46-05 07:07:00





             Test Item    Value        Reference Range Interpretation Comments

 

             Eosinophils (test code = 2.4          See_Comment                [A

utomated message] The



             Eosinophils)                                        system which ge

nerated



                                                                 this result tra

nsmitted



                                                                 reference range

: <=4.0.



                                                                 The reference r

zhen was



                                                                 not used to int

erpret



                                                                 this result as



                                                                 normal/abnormal

.



[facility] BrookFvmtpsdVVFIDOCHPN5990-60-29 07:07:00





             Test Item    Value        Reference Range Interpretation Comments

 

             Basophils (test code = 0.6          See_Comment                [Aut

omated message] The



             Basophils)                                          system which ge

nerated



                                                                 this result tra

nsmitted



                                                                 reference range

: <=1.0.



                                                                 The reference r

zhen was



                                                                 not used to int

erpret



                                                                 this result as



                                                                 normal/abnormal

.



[facility] BrookWoqujmlAZZNARXGQB1881-04-24 07:07:00





             Test Item    Value        Reference Range Interpretation Comments

 

             Segs-Bands # (test code = Segs-Bands #) 4.0          1.5-8.1       

            



[facility] BrookDhjnwtbMSRGOOXQLT5660-91-72 07:07:00





             Test Item    Value        Reference Range Interpretation Comments

 

             Monocytes (test code = Monocytes) 10.4         2.0-12.0            

      



[facility] BrookQyjaiqeFLBIYHRZBS2454-60-70 07:07:00





             Test Item    Value        Reference Range Interpretation Comments

 

             RBC Morph (test code = Normal (18 2:07 AM)                     

      



             RBC Morph)                                          



[facility] BrookTuqflbfFJTWFZNDOD1629-69-56 07:07:00





             Test Item    Value        Reference Range Interpretation Comments

 

             Segs (test code = Segs) 58.1         45.0-75.0                 



[facility] BrookBcvpuguVEOHSKXWKO7921-05-45 07:07:00





             Test Item    Value        Reference Range Interpretation Comments

 

             Plt Morph (test code = Normal (18 2:07 AM)                     

      



             Plt Morph)                                          



[facility] BrookJpeksmdRAUBTIASJT3838-36-57 07:07:00





             Test Item    Value        Reference Range Interpretation Comments

 

             Lymphocytes (test code = Lymphocytes) 28.5         20.0-40.0       

          



[facility]2018-05-06 07:07:00





             Test Item    Value        Reference Range Interpretation Comments

 

             Glucose Lvl (test code = Glucose Lvl) 74           70-99           

          



[facility]2018-05-06 07:07:00





             Test Item    Value        Reference Range Interpretation Comments

 

             BUN (test code = BUN) 12           7-22                      



[place] 892398-05-06 07:07:00





             Test Item    Value        Reference Range Interpretation Comments

 

             Creatinine Lvl (test code = Creatinine 0.75         0.50-1.40      

           



             Lvl)                                                



[place] 892398-05-06 07:07:00





             Test Item    Value        Reference Range Interpretation Comments

 

             eGFR (test code = eGFR) 140                                    



[place] 892398-05-06 07:07:00





             Test Item    Value        Reference Range Interpretation Comments

 

             Albumin Lvl (test code = Albumin Lvl) 2.9          3.5-5.0         

          



[place] 892398-05-06 07:07:00





             Test Item    Value        Reference Range Interpretation Comments

 

             Globulin (test code = Globulin) 4.4          2.7-4.2               

    



[place] 892398-05-06 07:07:00





             Test Item    Value        Reference Range Interpretation Comments

 

             A/G Ratio (test code = A/G Ratio) 0.7 1        0.7-1.6             

      



[place] [address]-05-06 07:07:00





             Test Item    Value        Reference Range Interpretation Comments

 

             Bili Total (test code = Bili Total) 0.4          0.2-1.3           

        



[place] [address]-05-06 07:07:00





             Test Item    Value        Reference Range Interpretation Comments

 

             Alk Phos (test code = Alk Phos) 71                           

    



[place] 892398-05-06 07:07:00





             Test Item    Value        Reference Range Interpretation Comments

 

             AST (test code = AST) 15           See_Comment                [Auto

mated message] The



                                                                 system which ge

nerated this



                                                                 result transmit

huma



                                                                 reference range

: <=37. The



                                                                 reference range

 was not



                                                                 used to interpr

et this



                                                                 result as zayda

l/abnormal.



[place] 892398-05-06 07:07:00





             Test Item    Value        Reference Range Interpretation Comments

 

             ALT (test code = ALT) 28           See_Comment                [Auto

mated message] The



                                                                 system which ge

nerated this



                                                                 result transmit

huma



                                                                 reference range

: <=65. The



                                                                 reference range

 was not



                                                                 used to interpr

et this



                                                                 result as zayda

l/abnormal.



[place] 892398-05-06 07:07:00





             Test Item    Value        Reference Range Interpretation Comments

 

             Potassium Lvl (test code = Potassium 4.4          3.5-5.1          

         



             Lvl)                                                



[place] [address]-05-06 07:07:00





             Test Item    Value        Reference Range Interpretation Comments

 

             Sodium Lvl (test code = Sodium Lvl) 147          135-145           

        



[facility]2018-05-06 07:07:00





             Test Item    Value        Reference Range Interpretation Comments

 

             CO2 (test code = CO2) 25           24-32                     



[place] 892398-05-06 07:07:00





             Test Item    Value        Reference Range Interpretation Comments

 

             Chloride Lvl (test code = Chloride Lvl) 114                  

            



[place] 892398-05-06 07:07:00





             Test Item    Value        Reference Range Interpretation Comments

 

             B/C Ratio (test code = B/C Ratio) 16 1         6-25                

      



[place] 892398-05-06 07:07:00





             Test Item    Value        Reference Range Interpretation Comments

 

             Calcium Lvl (test code = Calcium Lvl) 8.7          8.5-10.5        

          



[place] 892398-05-06 07:07:00





             Test Item    Value        Reference Range Interpretation Comments

 

             AGAP (test code = AGAP) 12.4         10.0-20.0                 



[facility]2018-05-06 07:07:00





             Test Item    Value        Reference Range Interpretation Comments

 

             Total Protein (test code = Total 7.3          6.4-8.4              

     



             Protein)                                            



[facility] BrookTqpsqcqOFWNRJQQMV2273-71-44 07:07:00





             Test Item    Value        Reference Range Interpretation Comments

 

             Platelet (test code = Platelet) 345          133-450               

    



[facility] BrookGhatuosASCGMRGKQJ0734-12-17 07:07:00





             Test Item    Value        Reference Range Interpretation Comments

 

             MPV (test code = MPV) 8.7          7.4-10.4                  



[place] 526068-05-06 07:07:00





             Test Item    Value        Reference Range Interpretation Comments

 

             WBC (test code = WBC) 6.9          3.7-10.4                  



[place] [address]-05-06 07:07:00





             Test Item    Value        Reference Range Interpretation Comments

 

             RBC (test code = RBC) 5.30         4.70-6.10                 



[facility] BrookHxbynjfGEWDEMZGJO9239-50-80 07:07:00





             Test Item    Value        Reference Range Interpretation Comments

 

             MCHC (test code = MCHC) 32.8         32.0-36.0                 



[place] 526068-05-06 07:07:00





             Test Item    Value        Reference Range Interpretation Comments

 

             MCH (test code = MCH) 27.9 pg      27.0-31.0                 



[facility] BrookTwcikmjPPNVDBPAMC8908-02-81 07:07:00





             Test Item    Value        Reference Range Interpretation Comments

 

             Hgb (test code = Hgb) 14.8         14.0-18.0                 



[facility] BrookGiwpimxULRDTERBNK8039-05-88 07:07:00





             Test Item    Value        Reference Range Interpretation Comments

 

             MCV (test code = MCV) 85.0         80.0-94.0                 



[facility] BrookUvqdcocSHSGPFOJXP2424-11-59 07:07:00





             Test Item    Value        Reference Range Interpretation Comments

 

             Hct (test code = Hct) 45.1         42.0-54.0                 



[facility] BrookDhfnrozMPYIRKYKEI4596-17-82 07:07:00





             Test Item    Value        Reference Range Interpretation Comments

 

             RDW (test code = RDW) 17.5         11.5-14.5                 



[facility] BrookCqhnzifKRRJEZRYCA9859-86-90 07:07:00





             Test Item    Value        Reference Range Interpretation Comments

 

             Eosinophils # (test code 0.2          See_Comment                [A

utomated message] The



             = Eosinophils #)                                        system whic

h generated



                                                                 this result tra

nsmitted



                                                                 reference range

: <=0.5.



                                                                 The reference r

zhen was



                                                                 not used to int

erpret



                                                                 this result as



                                                                 normal/abnormal

.



[facility] BrookQpkrsixRAQLWXVYSU0431-46-69 07:07:00





             Test Item    Value        Reference Range Interpretation Comments

 

             Lymphocytes # (test code = Lymphocytes 2.0          1.0-5.5        

           



             #)                                                  



[facility] BrookRmtrirzHTPNKFYGAE3687-85-95 07:07:00





             Test Item    Value        Reference Range Interpretation Comments

 

             Monocytes # (test code 0.7          See_Comment                [Aut

omated message] The



             = Monocytes #)                                        system which 

generated



                                                                 this result tra

nsmitted



                                                                 reference range

: <=0.8.



                                                                 The reference r

zhen was



                                                                 not used to int

erpret



                                                                 this result as



                                                                 normal/abnormal

.



[facility] BrookVjawceaQQNMTLENDT2529-17-42 07:07:00





             Test Item    Value        Reference Range Interpretation Comments

 

             Eosinophils (test code = 2.4          See_Comment                [A

utomated message] The



             Eosinophils)                                        system which ge

nerated



                                                                 this result tra

nsmitted



                                                                 reference range

: <=4.0.



                                                                 The reference r

zhen was



                                                                 not used to int

erpret



                                                                 this result as



                                                                 normal/abnormal

.



[facility] BrookMoccjgxTBELJIYNVN0134-84-10 07:07:00





             Test Item    Value        Reference Range Interpretation Comments

 

             Basophils (test code = 0.6          See_Comment                [Aut

omated message] The



             Basophils)                                          system which ge

nerated



                                                                 this result tra

nsmitted



                                                                 reference range

: <=1.0.



                                                                 The reference r

zhen was



                                                                 not used to int

erpret



                                                                 this result as



                                                                 normal/abnormal

.



[facility] BrookEpdfakxLLTWGBIFTR6804-40-30 07:07:00





             Test Item    Value        Reference Range Interpretation Comments

 

             Segs-Bands # (test code = Segs-Bands #) 4.0          1.5-8.1       

            



[facility] BrookFqzzlquKCLAGZSMBO5312-64-78 07:07:00





             Test Item    Value        Reference Range Interpretation Comments

 

             Monocytes (test code = Monocytes) 10.4         2.0-12.0            

      



[facility] BrookUjrjsqmEXDIDTRMYJ7137-49-02 07:07:00





             Test Item    Value        Reference Range Interpretation Comments

 

             RBC Morph (test code = Normal (18 2:07 AM)                     

      



             RBC Morph)                                          



[facility] BrookNzhsrvrNXJYAQLSNP8657-80-46 07:07:00





             Test Item    Value        Reference Range Interpretation Comments

 

             Segs (test code = Segs) 58.1         45.0-75.0                 



[facility] BrookUleqfcqHHRDKGQSCI7922-48-78 07:07:00





             Test Item    Value        Reference Range Interpretation Comments

 

             Plt Morph (test code = Normal (18 2:07 AM)                     

      



             Plt Morph)                                          



[facility] BrookZjuplsnYSMCQNESOJ7834-70-49 07:07:00





             Test Item    Value        Reference Range Interpretation Comments

 

             Lymphocytes (test code = Lymphocytes) 28.5         20.0-40.0       

          



[facility]2018-05-06 07:07:00





             Test Item    Value        Reference Range Interpretation Comments

 

             Glucose Lvl (test code = Glucose Lvl) 74           70-99           

          



[facility]2018-05-06 07:07:00





             Test Item    Value        Reference Range Interpretation Comments

 

             BUN (test code = BUN) 12           7-22                      



[place] 892398-05-06 07:07:00





             Test Item    Value        Reference Range Interpretation Comments

 

             Creatinine Lvl (test code = Creatinine 0.75         0.50-1.40      

           



             Lvl)                                                



[facility]2018-05-06 07:07:00





             Test Item    Value        Reference Range Interpretation Comments

 

             eGFR (test code = eGFR) 140                                    



[facility]2018-05-06 07:07:00





             Test Item    Value        Reference Range Interpretation Comments

 

             Albumin Lvl (test code = Albumin Lvl) 2.9          3.5-5.0         

          



[place] [id number] 07:07:00





             Test Item    Value        Reference Range Interpretation Comments

 

             Globulin (test code = Globulin) 4.4          2.7-4.2               

    



[place] 892398-05-06 07:07:00





             Test Item    Value        Reference Range Interpretation Comments

 

             A/G Ratio (test code = A/G Ratio) 0.7 1        0.7-1.6             

      



[place] 892398-05-06 07:07:00





             Test Item    Value        Reference Range Interpretation Comments

 

             Bili Total (test code = Bili Total) 0.4          0.2-1.3           

        



[place] 892398-05-06 07:07:00





             Test Item    Value        Reference Range Interpretation Comments

 

             Alk Phos (test code = Alk Phos) 71                           

    



[place] 892398-05-06 07:07:00





             Test Item    Value        Reference Range Interpretation Comments

 

             AST (test code = AST) 15           See_Comment                [Auto

mated message] The



                                                                 system which ge

nerated this



                                                                 result transmit

huma



                                                                 reference range

: <=37. The



                                                                 reference range

 was not



                                                                 used to interpr

et this



                                                                 result as zayda

l/abnormal.



[place] 892398-05-06 07:07:00





             Test Item    Value        Reference Range Interpretation Comments

 

             ALT (test code = ALT) 28           See_Comment                [Auto

mated message] The



                                                                 system which ge

nerated this



                                                                 result transmit

huma



                                                                 reference range

: <=65. The



                                                                 reference range

 was not



                                                                 used to interpr

et this



                                                                 result as zayda

l/abnormal.



[place] 892398-05-06 07:07:00





             Test Item    Value        Reference Range Interpretation Comments

 

             Potassium Lvl (test code = Potassium 4.4          3.5-5.1          

         



             Lvl)                                                



[facility]2018-05-06 07:07:00





             Test Item    Value        Reference Range Interpretation Comments

 

             Sodium Lvl (test code = Sodium Lvl) 147          135-145           

        



[place] 892398-05-06 07:07:00





             Test Item    Value        Reference Range Interpretation Comments

 

             CO2 (test code = CO2) 25           24-32                     



[facility]2018-05-06 07:07:00





             Test Item    Value        Reference Range Interpretation Comments

 

             Chloride Lvl (test code = Chloride Lvl) 114                  

            



[place] 892398-05-06 07:07:00





             Test Item    Value        Reference Range Interpretation Comments

 

             B/C Ratio (test code = B/C Ratio) 16 1         6-25                

      



[facility]2018-05-06 07:07:00





             Test Item    Value        Reference Range Interpretation Comments

 

             Calcium Lvl (test code = Calcium Lvl) 8.7          8.5-10.5        

          



[facility]2018-05-06 07:07:00





             Test Item    Value        Reference Range Interpretation Comments

 

             AGAP (test code = AGAP) 12.4         10.0-20.0                 



[facility]2018-05-06 07:07:00





             Test Item    Value        Reference Range Interpretation Comments

 

             Total Protein (test code = Total 7.3          6.4-8.4              

     



             Protein)                                            



[facility]





             Test Item    Value        Reference Range Interpretation Comments

 

             Platelet (test code = Platelet) 345          133-450               

    



[facility] BrookPtvvdqvDDNELVSDJY8646-81-25 07:07:00





             Test Item    Value        Reference Range Interpretation Comments

 

             MPV (test code = MPV) 8.7          7.4-10.4                  



[facility] BrookBozbyaiKKVQSBLISR3477-89-59 07:07:00





             Test Item    Value        Reference Range Interpretation Comments

 

             WBC (test code = WBC) 6.9          3.7-10.4                  



[facility] BrookIzhctaiBWIFBKDDMP0587-06-43 07:07:00





             Test Item    Value        Reference Range Interpretation Comments

 

             RBC (test code = RBC) 5.30         4.70-6.10                 



[facility] BrookVnrzzxtORIYOREFDM3994-98-52 07:07:00





             Test Item    Value        Reference Range Interpretation Comments

 

             MCHC (test code = MCHC) 32.8         32.0-36.0                 



[facility] BrookHodkihvYSPIRIGWMT7572-10-56 07:07:00





             Test Item    Value        Reference Range Interpretation Comments

 

             MCH (test code = MCH) 27.9 pg      27.0-31.0                 



[facility] BrookFlxpxezRIONYGIKOA7094-98-81 07:07:00





             Test Item    Value        Reference Range Interpretation Comments

 

             Hgb (test code = Hgb) 14.8         14.0-18.0                 



[facility] BrookDzekdvaYJYJQCEFJK4836-62-22 07:07:00





             Test Item    Value        Reference Range Interpretation Comments

 

             MCV (test code = MCV) 85.0         80.0-94.0                 



[facility] BrookFodamhwLZCQQUUZPG1580-28-26 07:07:00





             Test Item    Value        Reference Range Interpretation Comments

 

             Hct (test code = Hct) 45.1         42.0-54.0                 



[facility] BrookIphfsjhJNCTNNICHY4463-69-31 07:07:00





             Test Item    Value        Reference Range Interpretation Comments

 

             RDW (test code = RDW) 17.5         11.5-14.5                 



[facility] BrookLvymovlBKZTATTFMS1520-08-07 07:07:00





             Test Item    Value        Reference Range Interpretation Comments

 

             Eosinophils # (test code 0.2          See_Comment                [A

utomated message] The



             = Eosinophils #)                                        system [facility]

h generated



                                                                 this result tra

nsmitted



                                                                 reference range

: <=0.5.



                                                                 The reference r

zhen was



                                                                 not used to int

erpret



                                                                 this result as



                                                                 normal/abnormal

.



[facility] BrookQuzrckySXGRQVONHU4051-50-77 07:07:00





             Test Item    Value        Reference Range Interpretation Comments

 

             Lymphocytes # (test code = Lymphocytes 2.0          1.0-5.5        

           



             #)                                                  



[facility] BrookVxqzkvuBVJJRRYSFW2050-96-66 07:07:00





             Test Item    Value        Reference Range Interpretation Comments

 

             Monocytes # (test code 0.7          See_Comment                [Aut

omated message] The



             = Monocytes #)                                        system which 

generated



                                                                 this result tra

nsmitted



                                                                 reference range

: <=0.8.



                                                                 The reference r

zhen was



                                                                 not used to int

erpret



                                                                 this result as



                                                                 normal/abnormal

.



[facility] BrookNaudwiuZDQLGNKXQZ9194-77-72 07:07:00





             Test Item    Value        Reference Range Interpretation Comments

 

             Eosinophils (test code = 2.4          See_Comment                [A

utomated message] The



             Eosinophils)                                        system which ge

nerated



                                                                 this result tra

nsmitted



                                                                 reference range

: <=4.0.



                                                                 The reference r

zhen was



                                                                 not used to int

erpret



                                                                 this result as



                                                                 normal/abnormal

.



[facility] BrookNkcgwieZDDUJRUWCS0539-59-56 07:07:00





             Test Item    Value        Reference Range Interpretation Comments

 

             Basophils (test code = 0.6          See_Comment                [Aut

omated message] The



             Basophils)                                          system which ge

nerated



                                                                 this result tra

nsmitted



                                                                 reference range

: <=1.0.



                                                                 The reference r

zhen was



                                                                 not used to int

erpret



                                                                 this result as



                                                                 normal/abnormal

.



[facility] BrookGxwwiqqFPGWBSCVDL8278-23-56 07:07:00





             Test Item    Value        Reference Range Interpretation Comments

 

             Segs-Bands # (test code = Segs-Bands #) 4.0          1.5-8.1       

            



[facility] BrookJxchtwoTSYONRQHKQ3163-64-81 07:07:00





             Test Item    Value        Reference Range Interpretation Comments

 

             Monocytes (test code = Monocytes) 10.4         2.0-12.0            

      



[facility] BrookHozhfzqWZKBIYYJCM1535-17-53 07:07:00





             Test Item    Value        Reference Range Interpretation Comments

 

             RBC Morph (test code = Normal (18 2:07 AM)                     

      



             RBC Morph)                                          



[facility] BrookFbvcfjbCNDKJZWJCU1358-16-17 07:07:00





             Test Item    Value        Reference Range Interpretation Comments

 

             Segs (test code = Segs) 58.1         45.0-75.0                 



[facility] BrookFrdabxmNFCAHATSOH3945-22-21 07:07:00





             Test Item    Value        Reference Range Interpretation Comments

 

             Plt Morph (test code = Normal (18 2:07 AM)                     

      



             Plt Morph)                                          



[facility] BrookNdvytxkAUDMERECRN1813-96-44 07:07:00





             Test Item    Value        Reference Range Interpretation Comments

 

             Lymphocytes (test code = Lymphocytes) 28.5         20.0-40.0       

          



[facility] BrookMtjgdaiXSZENGYBUM0543-78-85 16:07:00





             Test Item    Value        Reference Range Interpretation Comments

 

             Basophils # (test code 0.1          See_Comment                [Aut

omated message] The



             = Basophils #)                                        system which 

generated



                                                                 this result tra

nsmitted



                                                                 reference range

: <=0.2.



                                                                 The reference r

zhen was



                                                                 not used to int

erpret



                                                                 this result as



                                                                 normal/abnormal

.



[facility] BrookQclonuwDXMCWLHSFT6854-59-67 16:07:00





             Test Item    Value        Reference Range Interpretation Comments

 

             Polychrom (test code = Moderate *ABN*(18                       

    



             Polychrom)   11:07 AM)                              



[facility] BrookMbsswapIURHTXSTEY6984-78-79 16:07:00





             Test Item    Value        Reference Range Interpretation Comments

 

             Basophils # (test code 0.1          See_Comment                [Aut

omated message] The



             = Basophils #)                                        system which 

generated



                                                                 this result tra

nsmitted



                                                                 reference range

: <=0.2.



                                                                 The reference r

zhen was



                                                                 not used to int

erpret



                                                                 this result as



                                                                 normal/abnormal

.



[facility] BrookCzbrwntGVXJGRQVAG1431-91-35 16:07:00





             Test Item    Value        Reference Range Interpretation Comments

 

             Polychrom (test code = Moderate *ABN*(18                       

    



             Polychrom)   11:07 AM)                              



[facility] BrookNtaxndkRTPAJUYQUB2356-98-15 16:07:00





             Test Item    Value        Reference Range Interpretation Comments

 

             Basophils # (test code 0.1          See_Comment                [Aut

omated message] The



             = Basophils #)                                        system which 

generated



                                                                 this result tra

nsmitted



                                                                 reference range

: <=0.2.



                                                                 The reference r

zhen was



                                                                 not used to int

erpret



                                                                 this result as



                                                                 normal/abnormal

.



[facility] BrookUqezfjiYJBCDCWAUW1513-18-15 16:07:00





             Test Item    Value        Reference Range Interpretation Comments

 

             Polychrom (test code = Moderate *ABN*(18                       

    



             Polychrom)   11:07 AM)                              



[facility] BrookLqkemrtPETHWGMSCT7760-28-10 16:07:00





             Test Item    Value        Reference Range Interpretation Comments

 

             Basophils # (test code 0.1          See_Comment                [Aut

omated message] The



             = Basophils #)                                        system which 

generated



                                                                 this result tra

nsmitted



                                                                 reference range

: <=0.2.



                                                                 The reference r

zhen was



                                                                 not used to int

erpret



                                                                 this result as



                                                                 normal/abnormal

.



[facility] BrookOxxhxhwCICUPHEUTE8564-02-09 16:07:00





             Test Item    Value        Reference Range Interpretation Comments

 

             Polychrom (test code = Moderate *ABN*(18                       

    



             Polychrom)   11:07 AM)                              



[facility] BrookLviwowvIWITSXNQOL4557-20-46 16:07:00





             Test Item    Value        Reference Range Interpretation Comments

 

             Basophils # (test code 0.1          See_Comment                [Aut

omated message] The



             = Basophils #)                                        system which 

generated



                                                                 this result tra

nsmitted



                                                                 reference range

: <=0.2.



                                                                 The reference r

zhen was



                                                                 not used to int

erpret



                                                                 this result as



                                                                 normal/abnormal

.



[facility] BrookNsyfusxWFHZYIXTAD9409-30-95 16:07:00





             Test Item    Value        Reference Range Interpretation Comments

 

             Polychrom (test code = Moderate *ABN*(18                       

    



             Polychrom)   11:07 AM)                              



[facility] BrookFtpicqkJWXIIXWFRZ1868-11-91 16:07:00





             Test Item    Value        Reference Range Interpretation Comments

 

             Basophils # (test code 0.1          See_Comment                [Aut

omated message] The



             = Basophils #)                                        system which 

generated



                                                                 this result tra

nsmitted



                                                                 reference range

: <=0.2.



                                                                 The reference r

zhen was



                                                                 not used to int

erpret



                                                                 this result as



                                                                 normal/abnormal

.



[facility] BrookEyabzduKTCXBOMDYM6051-55-91 16:07:00





             Test Item    Value        Reference Range Interpretation Comments

 

             Polychrom (test code = Moderate *ABN*(18                       

    



             Polychrom)   11:07 AM)                              



[place] 37536018-05-05 06:43:00





             Test Item    Value        Reference Range Interpretation Comments

 

             Vanco Tr TND (test code = Vanco Tr TND) *                          

            



[place] 37536018-05-05 06:43:00





             Test Item    Value        Reference Range Interpretation Comments

 

             Vanco Tr (test code = Vanco Tr) 22.3                               

    



[facility] OdtyemrOADGUFVPEM3295-74-77 06:43:00





             Test Item    Value        Reference Range Interpretation Comments

 

             Vanco Tr TND (test code = Vanco Tr TND) *                          

            



[facility] SjewqdkIIPOKFYTKQ5093-28-32 06:43:00





             Test Item    Value        Reference Range Interpretation Comments

 

             Vanco Tr (test code = Vanco Tr) 22.3                               

    



[facility] PvlnovdLCWIAHOYZK4793-06-62 06:43:00





             Test Item    Value        Reference Range Interpretation Comments

 

             Vanco Tr TND (test code = Vanco Tr TND) *                          

            



[facility] TfqgbtpFDIKYPESBW5243-09-76 06:43:00





             Test Item    Value        Reference Range Interpretation Comments

 

             Vanco Tr (test code = Vanco Tr) 22.3                               

    



[facility] YgtakjoDXCMRQPHQN3139-44-62 06:43:00





             Test Item    Value        Reference Range Interpretation Comments

 

             Vanco Tr TND (test code = Vanco Tr TND) *                          

            



[facility] ZvptxdyQBZBPYGCUM8171-06-45 06:43:00





             Test Item    Value        Reference Range Interpretation Comments

 

             Vanco Tr (test code = Vanco Tr) 22.3                               

    



[facility] ApwyvhcGZHCOCCALZ4837-82-25 06:43:00





             Test Item    Value        Reference Range Interpretation Comments

 

             Vanco Tr TND (test code = Vanco Tr TND) *                          

            



[facility] KpiaghsKHIVYWUEYH6543-95-28 06:43:00





             Test Item    Value        Reference Range Interpretation Comments

 

             Vanco Tr (test code = Vanco Tr) 22.3                               

    



[facility] UyxlzdsLMOFQACAOC1949-38-73 06:43:00





             Test Item    Value        Reference Range Interpretation Comments

 

             Vanco Tr TND (test code = Vanco Tr TND) *                          

            



[facility] WxfqzmdBJBSTUTBOC4057-46-25 06:43:00





             Test Item    Value        Reference Range Interpretation Comments

 

             Vanco Tr (test code = Vanco Tr) 22.3                               

    



[facility] WRORE[facility]73[facility] 11:45:00





             Test Item    Value        Reference Range Interpretation Comments

 

             Magnesium Lvl (test code = Magnesium 2.3          1.8-2.4          

         



             Lvl)                                                



[facility]annCHE[facility] SNBOO6028-80-27 11:45:00





             Test Item    Value        Reference Range Interpretation Comments

 

             Phosphorus (test code = Phosphorus) 3.5          2.5-4.5           

        



[facility] BrookBwjkvbeEUYZEBGCVT9424-60-75 11:45:00





             Test Item    Value        Reference Range Interpretation Comments

 

             PT (test code = PT) 14.0 s       12.0-14.7                 



[facility] BrookBsygcviNTUBFWWDKX5184-19-91 11:45:00





             Test Item    Value        Reference Range Interpretation Comments

 

             PTT (test code = PTT) 37.6 s       22.9-35.8                 



[facility] BrookQpydghvEJCOOHJAQC7962-78-72 11:45:00





             Test Item    Value        Reference Range Interpretation Comments

 

             INR (test code = INR) 1.08 1       0.85-1.17                 



[facility]szbqwqKQRMYDNWKG3813-95-17 11:45:00





             Test Item    Value        Reference Range Interpretation Comments

 

             C-REACTIVE PROTEIN (test code = 13.1                               

    



             C-REACTIVE PROTEIN)                                        



[facility] HospitalWzggagaUMVFDQFBFC1710-56-94 11:45:00





             Test Item    Value        Reference Range Interpretation Comments

 

             Prealbumin (test code = Prealbumin) 25.2         18.0-45.0         

        



[facility]2018-05-04 11:45:00





             Test Item    Value        Reference Range Interpretation Comments

 

             Magnesium Lvl (test code = Magnesium 2.3          1.8-2.4          

         



             Lvl)                                                



[facility]2018-05-04 11:45:00





             Test Item    Value        Reference Range Interpretation Comments

 

             Phosphorus (test code = Phosphorus) 3.5          2.5-4.5           

        



[facility] BrookEetqlonWQXPJFDELA6162-28-19 11:45:00





             Test Item    Value        Reference Range Interpretation Comments

 

             PT (test code = PT) 14.0 s       12.0-14.7                 



[facility]HgcvlxqOANZODGHCK8536-61-80 11:45:00





             Test Item    Value        Reference Range Interpretation Comments

 

             PTT (test code = PTT) 37.6 s       22.9-35.8                 



[facility] BrookDneluvkPZTTOWHNGP3311-85-25 11:45:00





             Test Item    Value        Reference Range Interpretation Comments

 

             INR (test code = INR) 1.08 1       0.85-1.17                 



[facility] HospitalWgmwjauTKISRSYMEU7683-37-60 11:45:00





             Test Item    Value        Reference Range Interpretation Comments

 

             C-REACTIVE PROTEIN (test code = 13.1                               

    



             C-REACTIVE PROTEIN)                                        



[facility] HospitalEwvbuwjXEOJRLLENI1565-86-53 11:45:00





             Test Item    Value        Reference Range Interpretation Comments

 

             Prealbumin (test code = Prealbumin) 25.2         18.0-45.0         

        



[facility]2018-05-04 11:45:00





             Test Item    Value        Reference Range Interpretation Comments

 

             Magnesium Lvl (test code = Magnesium 2.3          1.8-2.4          

         



             Lvl)                                                



[facility]2018-05-04 11:45:00





             Test Item    Value        Reference Range Interpretation Comments

 

             Phosphorus (test code = Phosphorus) 3.5          2.5-4.5           

        



[facility] BrookYcoxupqIVUCHZGOMC4078-87-38 11:45:00





             Test Item    Value        Reference Range Interpretation Comments

 

             PT (test code = PT) 14.0 s       12.0-14.7                 



[facility] BrookTvopfhfGFDAEFSNPW2642-75-06 11:45:00





             Test Item    Value        Reference Range Interpretation Comments

 

             PTT (test code = PTT) 37.6 s       22.9-35.8                 



[facility] BrookXxyyrxzDQYLPVQFFX8620-37-63 11:45:00





             Test Item    Value        Reference Range Interpretation Comments

 

             INR (test code = INR) 1.08 1       0.85-1.17                 



[facility] HospitalZzhwbyhGXDGOQAPOD2069-66-98 11:45:00





             Test Item    Value        Reference Range Interpretation Comments

 

             C-REACTIVE PROTEIN (test code = 13.1                               

    



             C-REACTIVE PROTEIN)                                        



[facility] HospitalDgapdzuHAYHTKJMAV2833-95-01 11:45:00





             Test Item    Value        Reference Range Interpretation Comments

 

             Prealbumin (test code = Prealbumin) 25.2         18.0-45.0         

        



[facility]2018-05-04 11:45:00





             Test Item    Value        Reference Range Interpretation Comments

 

             Magnesium Lvl (test code = Magnesium 2.3          1.8-2.4          

         



             Lvl)                                                



[facility]2018-05-04 11:45:00





             Test Item    Value        Reference Range Interpretation Comments

 

             Phosphorus (test code = Phosphorus) 3.5          2.5-4.5           

        



[facility] BrookTvclqlpUGIRCIGQYX9072-71-93 11:45:00





             Test Item    Value        Reference Range Interpretation Comments

 

             PT (test code = PT) 14.0 s       12.0-14.7                 



[facility] BrookXcgulzlGTJBVQOJMS9004-40-07 11:45:00





             Test Item    Value        Reference Range Interpretation Comments

 

             PTT (test code = PTT) 37.6 s       22.9-35.8                 



[facility] BrookLaphlyjXYZPFXRSIC0001-89-58 11:45:00





             Test Item    Value        Reference Range Interpretation Comments

 

             INR (test code = INR) 1.08 1       0.85-1.17                 



[facility] HospitalOznohijOWOYBSMQVC9442-53-52 11:45:00





             Test Item    Value        Reference Range Interpretation Comments

 

             C-REACTIVE PROTEIN (test code = 13.1                               

    



             C-REACTIVE PROTEIN)                                        



[facility] HospitalEzgueirMFWCTNLMVI8065-21-41 11:45:00





             Test Item    Value        Reference Range Interpretation Comments

 

             Prealbumin (test code = Prealbumin) 25.2         18.0-45.0         

        



[facility]2018-05-04 11:45:00





             Test Item    Value        Reference Range Interpretation Comments

 

             Magnesium Lvl (test code = Magnesium 2.3          1.8-2.4          

         



             Lvl)                                                



[facility]2018-05-04 11:45:00





             Test Item    Value        Reference Range Interpretation Comments

 

             Phosphorus (test code = Phosphorus) 3.5          2.5-4.5           

        



[facility] BrookZlnpoyjAGSMQXDBYS1910-12-69 11:45:00





             Test Item    Value        Reference Range Interpretation Comments

 

             PT (test code = PT) 14.0 s       12.0-14.7                 



[facility] BrookGfzzeotISZTFTEEIM6823-57-37 11:45:00





             Test Item    Value        Reference Range Interpretation Comments

 

             PTT (test code = PTT) 37.6 s       22.9-35.8                 



[facility] BrookBjyrrxqACQAHXQULV2597-90-55 11:45:00





             Test Item    Value        Reference Range Interpretation Comments

 

             INR (test code = INR) 1.08 1       0.85-1.17                 



[facility] HospitalTqaosclPLGLSKYTDX7098-44-39 11:45:00





             Test Item    Value        Reference Range Interpretation Comments

 

             C-REACTIVE PROTEIN (test code = 13.1                               

    



             C-REACTIVE PROTEIN)                                        



[facility] HospitalJqkfeqcUTGYSZWIKU1364-64-96 11:45:00





             Test Item    Value        Reference Range Interpretation Comments

 

             Prealbumin (test code = Prealbumin) 25.2         18.0-45.0         

        



[facility]2018-05-04 11:45:00





             Test Item    Value        Reference Range Interpretation Comments

 

             Magnesium Lvl (test code = Magnesium 2.3          1.8-2.4          

         



             Lvl)                                                



[facility]2018-05-04 11:45:00





             Test Item    Value        Reference Range Interpretation Comments

 

             Phosphorus (test code = Phosphorus) 3.5          2.5-4.5           

        



[facility] BrookXmjuoseQLMIBLAXLV3505-62-81 11:45:00





             Test Item    Value        Reference Range Interpretation Comments

 

             PT (test code = PT) 14.0 s       12.0-14.7                 



[facility] BrookYbnomptJIINIAVZJH6444-80-31 11:45:00





             Test Item    Value        Reference Range Interpretation Comments

 

             PTT (test code = PTT) 37.6 s       22.9-35.8                 



[facility] BrookCwlajgbNQPHJFWGDX7699-87-01 11:45:00





             Test Item    Value        Reference Range Interpretation Comments

 

             INR (test code = INR) 1.08 1       0.85-1.17                 



[facility] HospitalStatfekJNEGIUFJUI2096-27-12 11:45:00





             Test Item    Value        Reference Range Interpretation Comments

 

             C-REACTIVE PROTEIN (test code = 13.1                               

    



             C-REACTIVE PROTEIN)                                        



[place] [address]-05-04 11:45:00





             Test Item    Value        Reference Range Interpretation Comments

 

             Prealbumin (test code = Prealbumin) 25.2         18.0-45.0         

        



[facility]2018-05-04 03:06:00





             Test Item    Value        Reference Range Interpretation Comments

 

             Lactic Acid Lvl (test code = Lactic 0.9          0.5-2.2           

        



             Acid Lvl)                                           



[facility] BrookMouzpivONDZERGBOM5020-06-18 03:06:00





             Test Item    Value        Reference Range Interpretation Comments

 

             Sed Rate (test code = 5            See_Comment                [Auto

mated message] The



             Sed Rate)                                           system which ge

nerated this



                                                                 result transmit

huma



                                                                 reference range

: <=15. The



                                                                 reference range

 was not



                                                                 used to interpr

et this



                                                                 result as zayda

l/abnormal.



[facility] HospitalJxnbzrtICHBOMSZDD6496-66-54 03:06:00





             Test Item    Value        Reference Range Interpretation Comments

 

             C-REACTIVE PROTEIN (test code = 15.8                               

    



             C-REACTIVE PROTEIN)                                        



[facility]2018-05-04 03:06:00





             Test Item    Value        Reference Range Interpretation Comments

 

             Lactic Acid Lvl (test code = Lactic 0.9          0.5-2.2           

        



             Acid Lvl)                                           



[facility] BrookVulmhcjYEHYNNAYZT5126-01-55 03:06:00





             Test Item    Value        Reference Range Interpretation Comments

 

             Sed Rate (test code = 5            See_Comment                [Auto

mated message] The



             Sed Rate)                                           system which ge

nerated this



                                                                 result transmit

huma



                                                                 reference range

: <=15. The



                                                                 reference range

 was not



                                                                 used to interpr

et this



                                                                 result as zayda

l/abnormal.



[place] [address]-05-04 03:06:00





             Test Item    Value        Reference Range Interpretation Comments

 

             C-REACTIVE PROTEIN (test code = 15.8                               

    



             C-REACTIVE PROTEIN)                                        



[facility]2018-05-04 03:06:00





             Test Item    Value        Reference Range Interpretation Comments

 

             Lactic Acid Lvl (test code = Lactic 0.9          0.5-2.2           

        



             Acid Lvl)                                           



[facility] BrookZcjgcluMIDRPJWGPS5105-33-72 03:06:00





             Test Item    Value        Reference Range Interpretation Comments

 

             Sed Rate (test code = 5            See_Comment                [Auto

mated message] The



             Sed Rate)                                           system which ge

nerated this



                                                                 result transmit

huma



                                                                 reference range

: <=15. The



                                                                 reference range

 was not



                                                                 used to interpr

et this



                                                                 result as zayda

l/abnormal.



[facility] HospitalZeziwnyPDJTNMOSEI2359-63-13 03:06:00





             Test Item    Value        Reference Range Interpretation Comments

 

             C-REACTIVE PROTEIN (test code = 15.8                               

    



             C-REACTIVE PROTEIN)                                        



[facility]2018-05-04 03:06:00





             Test Item    Value        Reference Range Interpretation Comments

 

             Lactic Acid Lvl (test code = Lactic 0.9          0.5-2.2           

        



             Acid Lvl)                                           



[facility] BrookYieuyjiCTIIZBWDYD8294-52-84 03:06:00





             Test Item    Value        Reference Range Interpretation Comments

 

             Sed Rate (test code = 5            See_Comment                [Auto

mated message] The



             Sed Rate)                                           system which ge

nerated this



                                                                 result transmit

huma



                                                                 reference range

: <=15. The



                                                                 reference range

 was not



                                                                 used to interpr

et this



                                                                 result as zayda

l/abnormal.



[facility] HospitalDocvopnOYYUJWSZQW6353-70-14 03:06:00





             Test Item    Value        Reference Range Interpretation Comments

 

             C-REACTIVE PROTEIN (test code = 15.8                               

    



             C-REACTIVE PROTEIN)                                        



[facility]2018-05-04 03:06:00





             Test Item    Value        Reference Range Interpretation Comments

 

             Lactic Acid Lvl (test code = Lactic 0.9          0.5-2.2           

        



             Acid Lvl)                                           



[facility] BrookGnxcxvgYODZGSRAKR2027-44-53 03:06:00





             Test Item    Value        Reference Range Interpretation Comments

 

             Sed Rate (test code = 5            See_Comment                [Auto

mated message] The



             Sed Rate)                                           system which ge

nerated this



                                                                 result transmit

huma



                                                                 reference range

: <=15. The



                                                                 reference range

 was not



                                                                 used to interpr

et this



                                                                 result as zayda

l/abnormal.



[facility] HospitalTwpvtrfPRGZUXEEXG0208-13-55 03:06:00





             Test Item    Value        Reference Range Interpretation Comments

 

             C-REACTIVE PROTEIN (test code = 15.8                               

    



             C-REACTIVE PROTEIN)                                        



[facility]2018-05-04 03:06:00





             Test Item    Value        Reference Range Interpretation Comments

 

             Lactic Acid Lvl (test code = Lactic 0.9          0.5-2.2           

        



             Acid Lvl)                                           



[facility] BrookCwtvhqiFVMCWYBGBR2827-36-53 03:06:00





             Test Item    Value        Reference Range Interpretation Comments

 

             Sed Rate (test code = 5            See_Comment                [Auto

mated message] The



             Sed Rate)                                           system which ge

nerated this



                                                                 result transmit

huma



                                                                 reference range

: <=15. The



                                                                 reference range

 was not



                                                                 used to interpr

et this



                                                                 result as zayda

l/abnormal.



[facility]YfwfgtqFZUZPRNVCQ4674-76-[facility] 03:06:[facility]





             Test Item    Value        Reference Range Interpretation Comments

 

             C-REACTIVE PROTEIN (test code = 15.8                               

    



             C-REACTIVE PROTEIN)                                        



[facility] BrookSksfaxgWMUPJRXOMZ5987-88-67 00:44:00





             Test Item    Value        Reference Range Interpretation Comments

 

             PT (test code = PT) 13.0 s       12.0-14.7                 



[facility] BrookEqozzkgYDUIWDDSXK8049-86-03 00:44:00





             Test Item    Value        Reference Range Interpretation Comments

 

             INR (test code = INR) 0.98 1       0.85-1.17                 



[facility] BrookJissletOQQCGYWDZU1491-06-34 00:44:00





             Test Item    Value        Reference Range Interpretation Comments

 

             PTT (test code = PTT) 33.2 s       22.9-35.8                 



[facility] BrookYaultxsQRACLCBRIH3048-63-34 00:44:00





             Test Item    Value        Reference Range Interpretation Comments

 

             PT (test code = PT) 13.0 s       12.0-14.7                 



[facility] BrookBzfisaxMQOXRZLJBH4091-01-97 00:44:00





             Test Item    Value        Reference Range Interpretation Comments

 

             INR (test code = INR) 0.98 1       0.85-1.17                 



[facility] BrookVfqxbxbJAXRRLSTAF4348-11-38 00:44:00





             Test Item    Value        Reference Range Interpretation Comments

 

             PTT (test code = PTT) 33.2 s       22.9-35.8                 



[facility] BrookOqajrfcBSKLTCXLAW0080-71-14 00:44:00





             Test Item    Value        Reference Range Interpretation Comments

 

             PT (test code = PT) 13.0 s       12.0-14.7                 



[facility] BrookUfvnyvnMTHIUYBPTW1123-33-45 00:44:00





             Test Item    Value        Reference Range Interpretation Comments

 

             INR (test code = INR) 0.98 1       0.85-1.17                 



[facility] BrookClyrhcyYURWDKFLXD1965-11-83 00:44:00





             Test Item    Value        Reference Range Interpretation Comments

 

             PTT (test code = PTT) 33.2 s       22.9-35.8                 



[place] [address]6068-05-04 00:44:00





             Test Item    Value        Reference Range Interpretation Comments

 

             PT (test code = PT) 13.0 s       12.0-14.7                 



[facility] BrookRutcjzzKINUBCGJGT4595-76-38 00:44:00





             Test Item    Value        Reference Range Interpretation Comments

 

             INR (test code = INR) 0.98 1       0.85-1.17                 



[facility] BrookPwlgtunLPSFXZAOMS9720-52-76 00:44:00





             Test Item    Value        Reference Range Interpretation Comments

 

             PTT (test code = PTT) 33.2 s       22.9-35.8                 



[facility] BrookSuighibOMMOOQCLUF6400-14-80 00:44:00





             Test Item    Value        Reference Range Interpretation Comments

 

             PT (test code = PT) 13.0 s       12.0-14.7                 



[facility] BrookLloccvkJAJFQIGKVS4886-45-67 00:44:00





             Test Item    Value        Reference Range Interpretation Comments

 

             INR (test code = INR) 0.98 1       0.85-1.17                 



[facility] BrookWolkxtbWZWVVXKMKN9892-01-08 00:44:00





             Test Item    Value        Reference Range Interpretation Comments

 

             PTT (test code = PTT) 33.2 s       22.9-35.8                 



[facility] BrookCwwfhsyQIMQSDWTXQ4199-48-73 00:44:00





             Test Item    Value        Reference Range Interpretation Comments

 

             PT (test code = PT) 13.0 s       12.0-14.7                 



[facility] BrookTlpaggsJQJJRROXUY1257-85-63 00:44:00





             Test Item    Value        Reference Range Interpretation Comments

 

             INR (test code = INR) 0.98 1       0.85-1.17                 



[facility] BrookYifougxZMPXINJEYM1166-82-52 00:44:00





             Test Item    Value        Reference Range Interpretation Comments

 

             PTT (test code = PTT) 33.2 s       22.9-35.8                 



[facility]Hang[facility] SoftwareBid[facility] JFYRNKB2105-66-17 23:51:00





             Test Item    Value        Reference Range Interpretation Comments

 

             Antibody Scrn (test Negative (5/3/18 6:51                          

 



             code = Antibody Scrn) PM)                                    



[facility]Hang[facility] SoftwareBid[employer] GOGXZCX5203-91-26 23:51:00





             Test Item    Value        Reference Range Interpretation Comments

 

             ABO/Rh (test code = ABO/Rh) AB POS                                 



[facility] SoftwareB[employer] SFYCKCP4867-76-89 23:51:00





             Test Item    Value        Reference Range Interpretation Comments

 

             Antibody Scrn (test Negative (5/3/18 6:51                          

 



             code = Antibody Scrn) PM)                                    



[facility] UWOILEL9305-21-97 23:51:00





             Test Item    Value        Reference Range Interpretation Comments

 

             ABO/Rh (test code = ABO/Rh) AB POS                                 



[facility] OVBXIUK9195-28-47 23:51:00





             Test Item    Value        Reference Range Interpretation Comments

 

             Antibody Scrn (test Negative (5/3/18 6:51                          

 



             code = Antibody Scrn) PM)                                    



[facility] XEKSXZA0585-21-09 23:51:00





             Test Item    Value        Reference Range Interpretation Comments

 

             ABO/Rh (test code = ABO/Rh) AB POS                                 



[facility] ONQHNDM1526-22-80 23:51:00





             Test Item    Value        Reference Range Interpretation Comments

 

             Antibody Scrn (test Negative (5/3/18 6:51                          

 



             code = Antibody Scrn) PM)                                    



[facility] ALJWVTC6407-58-17 23:51:00





             Test Item    Value        Reference Range Interpretation Comments

 

             ABO/Rh (test code = ABO/Rh) AB POS                                 



[facility] HYAJZDW0067-09-16 23:51:00





             Test Item    Value        Reference Range Interpretation Comments

 

             Antibody Scrn (test Negative (5/3/18 6:51                          

 



             code = Antibody Scrn) PM)                                    



[facility] HCFOWUR7596-85-51 23:51:00





             Test Item    Value        Reference Range Interpretation Comments

 

             ABO/Rh (test code = ABO/Rh) AB POS                                 



[facility] [id number] 23:51:00





             Test Item    Value        Reference Range Interpretation Comments

 

             Antibody Scrn (test Negative (5/3/18 6:51                          

 



             code = Antibody Scrn) PM)                                    



[facility] EQLIGVI8253-87-80 23:51:00





             Test Item    Value        Reference Range Interpretation Comments

 

             ABO/Rh (test code = ABO/Rh) AB POS                                 



[facility] AND [id number] 23:35:00





             Test Item    Value        Reference Range Interpretation Comments

 

             UA Urobilinogen (test code = UA 0.2          0.1-1.0               

    



             Urobilinogen)                                        



[facility] [id number] 23:35:00





             Test Item    Value        Reference Range Interpretation Comments

 

             UA Nitrite (test code Negative (5/3/18 6:35                        

   



             = UA Nitrite) PM)                                    



[facility] AND [id number] 23:35:00





             Test Item    Value        Reference Range Interpretation Comments

 

             UA Glucose (test code Negative (5/3/18 6:35                        

   



             = UA Glucose) PM)                                    



[facility]  23:35:00





             Test Item    Value        Reference Range Interpretation Comments

 

             UA Ketones (test code Negative *NA*(5/3/18                         

  



             = UA Ketones) 6:35 PM)                               



[facility]  23:35:00





             Test Item    Value        Reference Range Interpretation Comments

 

             UA Bili (test code = Negative *NA*(5/3/18                          

 



             UA Bili)     6:35 PM)                               



[facility]  23:35:00





             Test Item    Value        Reference Range Interpretation Comments

 

             UA Blood (test code = Trace *ABN*(5/3/18                           



             UA Blood)    6:35 PM)                               



[facility]  23:35:00





             Test Item    Value        Reference Range Interpretation Comments

 

             UA Leuk Est (test code Small *ABN*(5/3/18 6:35                     

      



             = UA Leuk Est) PM)                                    



[facility] AND [id number] 23:35:00





             Test Item    Value        Reference Range Interpretation Comments

 

             UA Spec Grav (test code = UA Spec 1.020 1                          

      



             Grav)                                               



[facility]  23:35:00





             Test Item    Value        Reference Range Interpretation Comments

 

             UA pH (test code = UA pH) 6.0 1        5.0-8.0                   



[facility]  23:35:00





             Test Item    Value        Reference Range Interpretation Comments

 

             UA Color (test code = Yellow *NA*(5/3/18 6:35                      

     



             UA Color)    PM)                                    



[facility]  23:35:00





             Test Item    Value        Reference Range Interpretation Comments

 

             UA Protein (test code = Trace *ABN*(5/3/18                         

  



             UA Protein)  6:35 PM)                               



[facility] AND  23:35:00





             Test Item    Value        Reference Range Interpretation Comments

 

             UA Turbidity (test code Slight Cloudy (5/3/18                      

     



             = UA Turbidity) 6:35 PM)                               



[facility] AND [id number] 23:35:00





             Test Item    Value        Reference Range Interpretation Comments

 

             UA Hyal Cast 0-2 (5/3/18 6:35 See_Comment                [Automated

 message]



             (test code = UA PM)                                    The system w

[facility]



             Hyal Cast)                                          generated this 

result



                                                                 transmitted ref

erence



                                                                 range: <=2. The



                                                                 reference range

 was



                                                                 not used to int

erpret



                                                                 this result as



                                                                 normal/abnormal

.



[facility] AND [id number] 23:35:00





             Test Item    Value        Reference Range Interpretation Comments

 

             UA Bacteria (test code = UA Occasional /HPF                        

   



             Bacteria)                                           



[facility] AND [id number] 23:35:00





             Test Item    Value        Reference Range Interpretation Comments

 

             UA RBC (test code 11-20 /HPF   See_Comment                [Automate

d message] The



             = UA RBC)                                           system which ge

nerated



                                                                 this result tra

nsmitted



                                                                 reference range

: <=2. The



                                                                 reference range

 was not



                                                                 used to interpr

et this



                                                                 result as



                                                                 normal/abnormal

.



[facility] AND [id number] 23:35:00





             Test Item    Value        Reference Range Interpretation Comments

 

             UA Sq Epi (test code = UA Sq Occasional /LPF                       

    



             Epi)                                                



[facility] [id number] 23:35:00





             Test Item    Value        Reference Range Interpretation Comments

 

             UA WBC (test code = UA WBC)  /HPF                            



[facility] [id number] 23:35:00





             Test Item    Value        Reference Range Interpretation Comments

 

             UA Urobilinogen (test code = UA 0.2          0.1-1.0               

    



             Urobilinogen)                                        



[facility] AND [id number] 23:35:00





             Test Item    Value        Reference Range Interpretation Comments

 

             UA Nitrite (test code Negative (5/3/18 6:35                        

   



             = UA Nitrite) PM)                                    



[facility] [id number] 23:35:00





             Test Item    Value        Reference Range Interpretation Comments

 

             UA Glucose (test code Negative (5/3/18 6:35                        

   



             = UA Glucose) PM)                                    



[facility] [id number] 23:35:00





             Test Item    Value        Reference Range Interpretation Comments

 

             UA Ketones (test code Negative *NA*(5/3/18                         

  



             = UA Ketones) 6:35 PM)                               



[facility] AND [id number] 23:35:00





             Test Item    Value        Reference Range Interpretation Comments

 

             UA Bili (test code = Negative *NA*(5/3/18                          

 



             UA Bili)     6:35 PM)                               



[facility] [id number] 23:35:00





             Test Item    Value        Reference Range Interpretation Comments

 

             UA Blood (test code = Trace *ABN*(5/3/18                           



             UA Blood)    6:35 PM)                               



[facility] [id number] 23:35:00





             Test Item    Value        Reference Range Interpretation Comments

 

             UA Leuk Est (test code Small *ABN*(5/3/18 6:35                     

      



             = UA Leuk Est) PM)                                    



[facility] [id number] 23:35:00





             Test Item    Value        Reference Range Interpretation Comments

 

             UA Spec Grav (test code = UA Spec 1.020 1                          

      



             Grav)                                               



[facility] AND [id number] 23:35:00





             Test Item    Value        Reference Range Interpretation Comments

 

             UA pH (test code = UA pH) 6.0 1        5.0-8.0                   



[facility] AND [id number] 23:35:00





             Test Item    Value        Reference Range Interpretation Comments

 

             UA Color (test code = Yellow *NA*(5/3/18 6:35                      

     



             UA Color)    PM)                                    



[facility] AND [id number] 23:35:00





             Test Item    Value        Reference Range Interpretation Comments

 

             UA Protein (test code = Trace *ABN*(5/3/18                         

  



             UA Protein)  6:35 PM)                               



[facility] [id number] 23:35:00





             Test Item    Value        Reference Range Interpretation Comments

 

             UA Turbidity (test code Slight Cloudy (5/3/18                      

     



             = UA Turbidity) 6:35 PM)                               



[facility] [id number] 23:35:00





             Test Item    Value        Reference Range Interpretation Comments

 

             UA Hyal Cast 0-2 (5/3/18 6:35 See_Comment                [Automated

 message]



             (test code = UA PM)                                    The system w

[facility]



             Hyal Cast)                                          generated this 

result



                                                                 transmitted ref

erence



                                                                 range: <=2. The



                                                                 reference range

 was



                                                                 not used to int

erpret



                                                                 this result as



                                                                 normal/abnormal

.



[facility] AND [id number] 23:35:00





             Test Item    Value        Reference Range Interpretation Comments

 

             UA Bacteria (test code = UA Occasional /HPF                        

   



             Bacteria)                                           



[facility] AND [id number] 23:35:00





             Test Item    Value        Reference Range Interpretation Comments

 

             UA RBC (test code 11-20 /HPF   See_Comment                [Automate

d message] The



             = UA RBC)                                           system which ge

nerated



                                                                 this result tra

nsmitted



                                                                 reference range

: <=2. The



                                                                 reference range

 was not



                                                                 used to interpr

et this



                                                                 result as



                                                                 normal/abnormal

.



[facility] AND [id number] 23:35:00





             Test Item    Value        Reference Range Interpretation Comments

 

             UA Sq Epi (test code = UA Sq Occasional /LPF                       

    



             Epi)                                                



[facility] AND [id number] 23:35:00





             Test Item    Value        Reference Range Interpretation Comments

 

             UA WBC (test code = UA WBC)  /HPF                            



[facility] AND [id number] 23:35:00





             Test Item    Value        Reference Range Interpretation Comments

 

             UA Urobilinogen (test code = UA 0.2          0.1-1.0               

    



             Urobilinogen)                                        



[facility] AND [id number] 23:35:00





             Test Item    Value        Reference Range Interpretation Comments

 

             UA Nitrite (test code Negative (5/3/18 6:35                        

   



             = UA Nitrite) PM)                                    



[facility] AND [id number] 23:35:00





             Test Item    Value        Reference Range Interpretation Comments

 

             UA Glucose (test code Negative (5/3/18 6:35                        

   



             = UA Glucose) PM)                                    



[facility] AND [id number] 23:35:00





             Test Item    Value        Reference Range Interpretation Comments

 

             UA Ketones (test code Negative *NA*(5/3/18                         

  



             = UA Ketones) 6:35 PM)                               



[facility] AND [id number] 23:35:00





             Test Item    Value        Reference Range Interpretation Comments

 

             UA Bili (test code = Negative *NA*(5/3/18                          

 



             UA Bili)     6:35 PM)                               



[facility] AND [id number] 23:35:00





             Test Item    Value        Reference Range Interpretation Comments

 

             UA Blood (test code = Trace *ABN*(5/3/18                           



             UA Blood)    6:35 PM)                               



[facility] [id number] 23:35:00





             Test Item    Value        Reference Range Interpretation Comments

 

             UA Leuk Est (test code Small *ABN*(5/3/18 6:35                     

      



             = UA Leuk Est) PM)                                    



[facility] [id number] 23:35:00





             Test Item    Value        Reference Range Interpretation Comments

 

             UA Spec Grav (test code = UA Spec 1.020 1                          

      



             Grav)                                               



[facility] [id number] 23:35:00





             Test Item    Value        Reference Range Interpretation Comments

 

             UA pH (test code = UA pH) 6.0 1        5.0-8.0                   



[facility] [id number] 23:35:00





             Test Item    Value        Reference Range Interpretation Comments

 

             UA Color (test code = Yellow *NA*(5/3/18 6:35                      

     



             UA Color)    PM)                                    



[facility] [id number] 23:35:00





             Test Item    Value        Reference Range Interpretation Comments

 

             UA Protein (test code = Trace *ABN*(5/3/18                         

  



             UA Protein)  6:35 PM)                               



[facility] [id number] 23:35:00





             Test Item    Value        Reference Range Interpretation Comments

 

             UA Turbidity (test code Slight Cloudy (5/3/18                      

     



             = UA Turbidity) 6:35 PM)                               



[facility] [id number] 23:35:00





             Test Item    Value        Reference Range Interpretation Comments

 

             UA Hyal Cast 0-2 (5/3/18 6:35 See_Comment                [Automated

 message]



             (test code = UA PM)                                    The system w

[employer]



             Hyal Cast)                                          generated this 

result



                                                                 transmitted ref

erence



                                                                 range: <=2. The



                                                                 reference range

 was



                                                                 not used to int

erpret



                                                                 this result as



                                                                 normal/abnormal

.



[facility] [id number] 23:35:00





             Test Item    Value        Reference Range Interpretation Comments

 

             UA Bacteria (test code = UA Occasional /HPF                        

   



             Bacteria)                                           



[facility] [id number] 23:35:00





             Test Item    Value        Reference Range Interpretation Comments

 

             UA RBC (test code 11-20 /HPF   See_Comment                [Automate

d message] The



             = UA RBC)                                           system which ge

nerated



                                                                 this result tra

nsmitted



                                                                 reference range

: <=2. The



                                                                 reference range

 was not



                                                                 used to interpr

et this



                                                                 result as



                                                                 normal/abnormal

.



[facility] [id number] 23:35:00





             Test Item    Value        Reference Range Interpretation Comments

 

             UA Sq Epi (test code = UA Sq Occasional /LPF                       

    



             Epi)                                                



[facility] [id number] 23:35:00





             Test Item    Value        Reference Range Interpretation Comments

 

             UA WBC (test code = UA WBC)  /HPF                            



[facility] [id number] 23:35:00





             Test Item    Value        Reference Range Interpretation Comments

 

             UA Urobilinogen (test code = UA 0.2          0.1-1.0               

    



             Urobilinogen)                                        



[facility] [id number] 23:35:00





             Test Item    Value        Reference Range Interpretation Comments

 

             UA Nitrite (test code Negative (5/3/18 6:35                        

   



             = UA Nitrite) PM)                                    



[facility] AND  23:35:00





             Test Item    Value        Reference Range Interpretation Comments

 

             UA Glucose (test code Negative (5/3/18 6:35                        

   



             = UA Glucose) PM)                                    



[facility] AND  23:35:00





             Test Item    Value        Reference Range Interpretation Comments

 

             UA Ketones (test code Negative *NA*(5/3/18                         

  



             = UA Ketones) 6:35 PM)                               



[facility] AND  23:35:00





             Test Item    Value        Reference Range Interpretation Comments

 

             UA Bili (test code = Negative *NA*(5/3/18                          

 



             UA Bili)     6:35 PM)                               



[facility] AND  23:35:00





             Test Item    Value        Reference Range Interpretation Comments

 

             UA Blood (test code = Trace *ABN*(5/3/18                           



             UA Blood)    6:35 PM)                               



[facility] AND [id number] 23:35:00





             Test Item    Value        Reference Range Interpretation Comments

 

             UA Leuk Est (test code Small *ABN*(5/3/18 6:35                     

      



             = UA Leuk Est) PM)                                    



[facility] AND [id number] 23:35:00





             Test Item    Value        Reference Range Interpretation Comments

 

             UA Spec Grav (test code = UA Spec 1.020 1                          

      



             Grav)                                               



[facility]  23:35:00





             Test Item    Value        Reference Range Interpretation Comments

 

             UA pH (test code = UA pH) 6.0 1        5.0-8.0                   



[facility] AND  23:35:00





             Test Item    Value        Reference Range Interpretation Comments

 

             UA Color (test code = Yellow *NA*(5/3/18 6:35                      

     



             UA Color)    PM)                                    



[facility] AND  23:35:00





             Test Item    Value        Reference Range Interpretation Comments

 

             UA Protein (test code = Trace *ABN*(5/3/18                         

  



             UA Protein)  6:35 PM)                               



[facility] AND [id number] 23:35:00





             Test Item    Value        Reference Range Interpretation Comments

 

             UA Turbidity (test code Slight Cloudy (5/3/18                      

     



             = UA Turbidity) 6:35 PM)                               



[facility] AND [id number] 23:35:00





             Test Item    Value        Reference Range Interpretation Comments

 

             UA Hyal Cast 0-2 (5/3/18 6:35 See_Comment                [Automated

 message]



             (test code = UA PM)                                    The system w

[facility]



             Hyal Cast)                                          generated this 

result



                                                                 transmitted ref

erence



                                                                 range: <=2. The



                                                                 reference range

 was



                                                                 not used to int

erpret



                                                                 this result as



                                                                 normal/abnormal

.



[facility] AND [id number] 23:35:00





             Test Item    Value        Reference Range Interpretation Comments

 

             UA Bacteria (test code = UA Occasional /HPF                        

   



             Bacteria)                                           



[facility] AND [id number] 23:35:00





             Test Item    Value        Reference Range Interpretation Comments

 

             UA RBC (test code 11-20 /HPF   See_Comment                [Automate

d message] The



             = UA RBC)                                           system which ge

nerated



                                                                 this result tra

nsmitted



                                                                 reference range

: <=2. The



                                                                 reference range

 was not



                                                                 used to interpr

et this



                                                                 result as



                                                                 normal/abnormal

.



[facility] AND [id number] 23:35:00





             Test Item    Value        Reference Range Interpretation Comments

 

             UA Sq Epi (test code = UA Sq Occasional /LPF                       

    



             Epi)                                                



[facility] AND [id number] 23:35:00





             Test Item    Value        Reference Range Interpretation Comments

 

             UA WBC (test code = UA WBC)  /HPF                            



[facility] AND [id number] 23:35:00





             Test Item    Value        Reference Range Interpretation Comments

 

             UA Urobilinogen (test code = UA 0.2          0.1-1.0               

    



             Urobilinogen)                                        



[facility] AND [id number] 23:35:00





             Test Item    Value        Reference Range Interpretation Comments

 

             UA Nitrite (test code Negative (5/3/18 6:35                        

   



             = UA Nitrite) PM)                                    



[facility] AND [id number] 23:35:00





             Test Item    Value        Reference Range Interpretation Comments

 

             UA Glucose (test code Negative (5/3/18 6:35                        

   



             = UA Glucose) PM)                                    



[facility] AND [id number] 23:35:00





             Test Item    Value        Reference Range Interpretation Comments

 

             UA Ketones (test code Negative *NA*(5/3/18                         

  



             = UA Ketones) 6:35 PM)                               



[facility] AND [id number] 23:35:00





             Test Item    Value        Reference Range Interpretation Comments

 

             UA Bili (test code = Negative *NA*(5/3/18                          

 



             UA Bili)     6:35 PM)                               



[facility] AND [id number] 23:35:00





             Test Item    Value        Reference Range Interpretation Comments

 

             UA Blood (test code = Trace *ABN*(5/3/18                           



             UA Blood)    6:35 PM)                               



[facility] AND [id number] 23:35:00





             Test Item    Value        Reference Range Interpretation Comments

 

             UA Leuk Est (test code Small *ABN*(5/3/18 6:35                     

      



             = UA Leuk Est) PM)                                    



[facility] AND [id number] 23:35:00





             Test Item    Value        Reference Range Interpretation Comments

 

             UA Spec Grav (test code = UA Spec 1.020 1                          

      



             Grav)                                               



[facility] AND [id number] 23:35:00





             Test Item    Value        Reference Range Interpretation Comments

 

             UA pH (test code = UA pH) 6.0 1        5.0-8.0                   



[facility] AND  23:35:00





             Test Item    Value        Reference Range Interpretation Comments

 

             UA Color (test code = Yellow *NA*(5/3/18 6:35                      

     



             UA Color)    PM)                                    



[facility] AND [id number] 23:35:00





             Test Item    Value        Reference Range Interpretation Comments

 

             UA Protein (test code = Trace *ABN*(5/3/18                         

  



             UA Protein)  6:35 PM)                               



[facility] AND [id number] 23:35:00





             Test Item    Value        Reference Range Interpretation Comments

 

             UA Turbidity (test code Slight Cloudy (5/3/18                      

     



             = UA Turbidity) 6:35 PM)                               



[facility] AND [id number] 23:35:00





             Test Item    Value        Reference Range Interpretation Comments

 

             UA Hyal Cast 0-2 (5/3/18 6:35 See_Comment                [Automated

 message]



             (test code = UA PM)                                    The system w

[facility]



             Hyal Cast)                                          generated this 

result



                                                                 transmitted ref

erence



                                                                 range: <=2. The



                                                                 reference range

 was



                                                                 not used to int

erpret



                                                                 this result as



                                                                 normal/abnormal

.



[facility] AND [id number] 23:35:00





             Test Item    Value        Reference Range Interpretation Comments

 

             UA Bacteria (test code = UA Occasional /HPF                        

   



             Bacteria)                                           



[facility] AND [id number] 23:35:00





             Test Item    Value        Reference Range Interpretation Comments

 

             UA RBC (test code 11-20 /HPF   See_Comment                [Automate

d message] The



             = UA RBC)                                           system which ge

nerated



                                                                 this result tra

nsmitted



                                                                 reference range

: <=2. The



                                                                 reference range

 was not



                                                                 used to interpr

et this



                                                                 result as



                                                                 normal/abnormal

.



[facility] AND [id number] 23:35:00





             Test Item    Value        Reference Range Interpretation Comments

 

             UA Sq Epi (test code = UA Sq Occasional /LPF                       

    



             Epi)                                                



[facility] AND [id number] 23:35:00





             Test Item    Value        Reference Range Interpretation Comments

 

             UA WBC (test code = UA WBC)  /HPF                            



[facility] AND [id number] 23:35:00





             Test Item    Value        Reference Range Interpretation Comments

 

             UA Urobilinogen (test code = UA 0.2          0.1-1.0               

    



             Urobilinogen)                                        



[facility] AND [id number] 23:35:00





             Test Item    Value        Reference Range Interpretation Comments

 

             UA Nitrite (test code Negative (5/3/18 6:35                        

   



             = UA Nitrite) PM)                                    



[facility] AND [id number] 23:35:00





             Test Item    Value        Reference Range Interpretation Comments

 

             UA Glucose (test code Negative (5/3/18 6:35                        

   



             = UA Glucose) PM)                                    



[facility] AND [id number] 23:35:00





             Test Item    Value        Reference Range Interpretation Comments

 

             UA Ketones (test code Negative *NA*(5/3/18                         

  



             = UA Ketones) 6:35 PM)                               



[facility] AND [id number] 23:35:00





             Test Item    Value        Reference Range Interpretation Comments

 

             UA Bili (test code = Negative *NA*(5/3/18                          

 



             UA Bili)     6:35 PM)                               



[facility] AND [id number] 23:35:00





             Test Item    Value        Reference Range Interpretation Comments

 

             UA Blood (test code = Trace *ABN*(5/3/18                           



             UA Blood)    6:35 PM)                               



[facility] AND [id number] 23:35:00





             Test Item    Value        Reference Range Interpretation Comments

 

             UA Leuk Est (test code Small *ABN*(5/3/18 6:35                     

      



             = UA Leuk Est) PM)                                    



[facility] AND [id number] 23:35:00





             Test Item    Value        Reference Range Interpretation Comments

 

             UA Spec Grav (test code = UA Spec 1.020 1                          

      



             Grav)                                               



[facility] AND [id number] 23:35:00





             Test Item    Value        Reference Range Interpretation Comments

 

             UA pH (test code = UA pH) 6.0 1        5.0-8.0                   



[facility] AND [id number] 23:35:00





             Test Item    Value        Reference Range Interpretation Comments

 

             UA Color (test code = Yellow *NA*(5/3/18 6:35                      

     



             UA Color)    PM)                                    



[facility] AND [id number] 23:35:00





             Test Item    Value        Reference Range Interpretation Comments

 

             UA Protein (test code = Trace *ABN*(5/3/18                         

  



             UA Protein)  6:35 PM)                               



[facility] AND [id number] 23:35:00





             Test Item    Value        Reference Range Interpretation Comments

 

             UA Turbidity (test code Slight Cloudy (5/3/18                      

     



             = UA Turbidity) 6:35 PM)                               



[facility] AND [id number] 23:35:00





             Test Item    Value        Reference Range Interpretation Comments

 

             UA Hyal Cast 0-2 (5/3/18 6:35 See_Comment                [Automated

 message]



             (test code = UA PM)                                    The system w

[facility]



             Hyal Cast)                                          generated this 

result



                                                                 transmitted ref

erence



                                                                 range: <=2. The



                                                                 reference range

 was



                                                                 not used to int

erpret



                                                                 this result as



                                                                 normal/abnormal

.



[facility] AND [id number] 23:35:00





             Test Item    Value        Reference Range Interpretation Comments

 

             UA Bacteria (test code = UA Occasional /HPF                        

   



             Bacteria)                                           



[facility] AND [id number] 23:35:00





             Test Item    Value        Reference Range Interpretation Comments

 

             UA RBC (test code 11-20 /HPF   See_Comment                [Automate

d message] The



             = UA RBC)                                           system which ge

nerated



                                                                 this result tra

nsmitted



                                                                 reference range

: <=2. The



                                                                 reference range

 was not



                                                                 used to interpr

et this



                                                                 result as



                                                                 normal/abnormal

.



[facility] AND [id number] 23:35:00





             Test Item    Value        Reference Range Interpretation Comments

 

             UA Sq Epi (test code = UA Sq Occasional /LPF                       

    



             Epi)                                                



[facility] AND [id number] 23:35:00





             Test Item    Value        Reference Range Interpretation Comments

 

             UA WBC (test code = UA WBC)  /HPF                            



[id number]





             Test Item    Value        Reference Range Interpretation Comments

 

             Basophils # (test code 0.1          See_Comment                [Aut

omated message] The



             = Basophils #)                                        system which 

generated



                                                                 this result tra

nsmitted



                                                                 reference range

: <=0.2.



                                                                 The reference r

zhen was



                                                                 not used to int

erpret



                                                                 this result as



                                                                 normal/abnormal

.



[id number]





             Test Item    Value        Reference Range Interpretation Comments

 

             Polychrom (test code = Polychrom) Slight                           

      



[id number] 23:20:00





             Test Item    Value        Reference Range Interpretation Comments

 

             Plt Morph (test code = Normal (5/3/18 6:20 PM)                     

      



             Plt Morph)                                          



[id number] 23:20:00





             Test Item    Value        Reference Range Interpretation Comments

 

             Basophils # (test code 0.1          See_Comment                [Aut

omated message] The



             = Basophils #)                                        system which 

generated



                                                                 this result tra

nsmitted



                                                                 reference range

: <=0.2.



                                                                 The reference r

zhen was



                                                                 not used to int

erpret



                                                                 this result as



                                                                 normal/abnormal

.



[id number] 23:20:00





             Test Item    Value        Reference Range Interpretation Comments

 

             Polychrom (test code = Polychrom) Slight                           

      



[facility] [id number] 23:20:00





             Test Item    Value        Reference Range Interpretation Comments

 

             Plt Morph (test code = Normal (5/3/18 6:20 PM)                     

      



             Plt Morph)                                          



[facility] BrookDgbuqyhMHFKRPBVYJ7563-18-29 23:20:00





             Test Item    Value        Reference Range Interpretation Comments

 

             Basophils # (test code 0.1          See_Comment                [Aut

omated message] The



             = Basophils #)                                        system which 

generated



                                                                 this result tra

nsmitted



                                                                 reference range

: <=0.2.



                                                                 The reference r

zhen was



                                                                 not used to int

erpret



                                                                 this result as



                                                                 normal/abnormal

.



[facility] [id number] 23:20:00





             Test Item    Value        Reference Range Interpretation Comments

 

             Polychrom (test code = Polychrom) Slight                           

      



[facility] BrookMjthpqrKEGWTGBURG7214-85-12 23:20:00





             Test Item    Value        Reference Range Interpretation Comments

 

             Plt Morph (test code = Normal (5/3/18 6:20 PM)                     

      



             Plt Morph)                                          



[facility] [id number] 23:20:00





             Test Item    Value        Reference Range Interpretation Comments

 

             Basophils # (test code 0.1          See_Comment                [Aut

omated message] The



             = Basophils #)                                        system which 

generated



                                                                 this result tra

nsmitted



                                                                 reference range

: <=0.2.



                                                                 The reference r

zhen was



                                                                 not used to int

erpret



                                                                 this result as



                                                                 normal/abnormal

.



[facility] BrookVnrtonhPPCXLIXTQM2530-16-24 23:20:00





             Test Item    Value        Reference Range Interpretation Comments

 

             Polychrom (test code = Polychrom) Slight                           

      



[facility] BrookJrgumtgWWYALGIZFB5830-55-11 23:20:00





             Test Item    Value        Reference Range Interpretation Comments

 

             Plt Morph (test code = Normal (5/3/18 6:20 PM)                     

      



             Plt Morph)                                          



[facility] BrookYjlvpbpCOIGGCVAMQ3363-18-21 23:20:00





             Test Item    Value        Reference Range Interpretation Comments

 

             Basophils # (test code 0.1          See_Comment                [Aut

omated message] The



             = Basophils #)                                        system which 

generated



                                                                 this result tra

nsmitted



                                                                 reference range

: <=0.2.



                                                                 The reference r

zhen was



                                                                 not used to int

erpret



                                                                 this result as



                                                                 normal/abnormal

.



[facility] Bro[id number] 23:20:00





             Test Item    Value        Reference Range Interpretation Comments

 

             Polychrom (test code = Polychrom) Slight                           

      



[facility] [id number] 23:20:00





             Test Item    Value        Reference Range Interpretation Comments

 

             Plt Morph (test code = Normal (5/3/18 6:20 PM)                     

      



             Plt Morph)                                          



[facility] [id number] 23:20:00





             Test Item    Value        Reference Range Interpretation Comments

 

             Basophils # (test code 0.1          See_Comment                [Aut

omated message] The



             = Basophils #)                                        system which 

generated



                                                                 this result tra

nsmitted



                                                                 reference range

: <=0.2.



                                                                 The reference r

zhen was



                                                                 not used to int

erpret



                                                                 this result as



                                                                 normal/abnormal

.



[facility] BrookSjvmuaxRYLOYLXVFN1542-77-11 23:20:00





             Test Item    Value        Reference Range Interpretation Comments

 

             Polychrom (test code = Polychrom) Slight                           

      



[facility] BrookDbgevthZQLVOXYKQX7101-30-15 23:20:00





             Test Item    Value        Reference Range Interpretation Comments

 

             Plt Morph (test code = Normal (5/3/18 6:20 PM)                     

      



             Plt Morph)                                          



[facility] [id number] 16:22:00





             Test Item    Value        Reference Range Interpretation Comments

 

             CULTURE (BEAKER) (test No growth in 5 days                         

  



             code = 1095)                                        



BLOOD [id number] 16:22:00





             Test Item    Value        Reference Range Interpretation Comments

 

             CULTURE (BEAKER) (test No growth in 5 days                         

  



             code = 1095)                                        



(MANUAL DIFFERENTIAL)2017 22:29:00





             Test Item    Value        Reference Range Interpretation Comments

 

             TOTAL COUNTED (BEAKER) (test code =                                

        



             1351)                                               

 

             WBC MORPHOLOGY (BEAKER) (test code = Normal                        

         



             487)                                                

 

             PLT MORPHOLOGY (BEAKER) (test code = Normal                        

         



             486)                                                

 

             RBC MORPHOLOGY (BEAKER) (test code = Normal                        

         



             762)                                                



CBC W/PLT COUNT &amp; AUTO [id number] 22:28:00





             Test Item    Value        Reference Range Interpretation Comments

 

             WHITE BLOOD CELL COUNT (BEAKER) 7.3 K/ L     4.0-10.0              

    



             (test code = 775)                                        

 

             RED BLOOD CELL COUNT (BEAKER) 5.09 M/ L    4.20-5.80               

  



             (test code = 761)                                        

 

             HEMOGLOBIN (BEAKER) (test code = 13.0 GM/DL   13.0-16.8            

     



             410)                                                

 

             HEMATOCRIT (BEAKER) (test code = 42.3 %       40.0-50.0            

     



             411)                                                

 

             MEAN CORPUSCULAR VOLUME (BEAKER) 83.0 fL      82.0-98.0            

     



             (test code = 753)                                        

 

             MEAN CORPUSCULAR HEMOGLOBIN 25.6 pg      27.0-33.0    L            



             (BEAKER) (test code = 751)                                        

 

             MEAN CORPUSCULAR HEMOGLOBIN CONC 30.8 GM/DL   32.0-36.0    L       

     



             (BEAKER) (test code = 752)                                        

 

             RED CELL DISTRIBUTION WIDTH 18.0 %       10.3-14.2    H            



             (BEAKER) (test code = 412)                                        

 

             PLATELET COUNT (BEAKER) (test 331 K/CU MM  150-430                 

  



             code = 756)                                         

 

             MEAN PLATELET VOLUME (BEAKER) 8.5 fL       6.5-10.5                

  



             (test code = 754)                                        

 

             NUCLEATED RED BLOOD CELLS 0 /100 WBC   0-0                       



             (BEAKER) (test code = 413)                                        

 

             NEUTROPHILS RELATIVE PERCENT 65 %                                  

 



             (BEAKER) (test code = 429)                                        

 

             LYMPHOCYTES RELATIVE PERCENT 23 %                                  

 



             (BEAKER) (test code = 430)                                        

 

             MONOCYTES RELATIVE PERCENT 8 %                                    



             (BEAKER) (test code = 431)                                        

 

             EOSINOPHILS RELATIVE PERCENT 3 %                                   

 



             (BEAKER) (test code = 432)                                        

 

             BASOPHILS RELATIVE PERCENT 1 %                                    



             (BEAKER) (test code = 437)                                        

 

             NEUTROPHILS ABSOLUTE COUNT 4.75 K/ L    1.80-8.00                 



             (BEAKER) (test code = 670)                                        

 

             LYMPHOCYTES ABSOLUTE COUNT 1.68 K/ L    1.48-4.50                 



             (BEAKER) (test code = 414)                                        

 

             MONOCYTES ABSOLUTE COUNT (BEAKER) 0.55 K/ L    0.00-1.30           

      



             (test code = 415)                                        

 

             EOSINOPHILS ABSOLUTE COUNT 0.24 K/ L    0.00-0.50                 



             (BEAKER) (test code = 416)                                        

 

             BASOPHILS ABSOLUTE COUNT (BEAKER) 0.05 K/ L    0.00-0.20           

      



             (test code = 417)                                        



[id number] METABOLIC [id number] 11:11:00





             Test Item    Value        Reference Range Interpretation Comments

 

             SODIUM (BEAKER) 138 meq/L    136-145                   



             (test code = 381)                                        

 

             POTASSIUM (BEAKER) 4.0 meq/L    3.5-5.1                   



             (test code = 379)                                        

 

             CHLORIDE (BEAKER) 108 meq/L           H            



             (test code = 382)                                        

 

             CO2 (BEAKER) (test 23 meq/L     22-29                     



             code = 355)                                         

 

             BLOOD UREA NITROGEN 11 mg/dL     7-21                      



             (BEAKER) (test code                                        



             = 354)                                              

 

             CREATININE (BEAKER) 0.74 mg/dL   0.57-1.25                 



             (test code = 358)                                        

 

             GLUCOSE RANDOM 124 mg/dL           H            



             (BEAKER) (test code                                        



             = 652)                                              

 

             CALCIUM (BEAKER) 8.9 mg/dL    8.4-10.2                  



             (test code = 697)                                        

 

             EGFR (BEAKER) (test 150 mL/min/1.73                           ESTIM

ATED GFR IS



             code = 1092) sq m                                   NOT AS ACCURATE

 AS



                                                                 CREATININE



                                                                 CLEARANCE IN



                                                                 PREDICTING



                                                                 GLOMERULAR



                                                                 FILTRATION RATE

.



                                                                 ESTIMATED GFR I

S



                                                                 NOT APPLICABLE 

FOR



                                                                 DIALYSIS PATIEN

TS.



URINE [id number] 09:56:00





             Test Item    Value        Reference Range Interpretation Comments

 

             CULTURE (BEAKER) (test <10,000 col/mL skin                         

  



             code = 1095) jaime                                  



COMPREHENSIVE METABOLIC [id number] 08:49:00





             Test Item    Value        Reference Range Interpretation Comments

 

             TOTAL PROTEIN 7.3 gm/dL    6.0-8.3                   



             (BEAKER) (test code =                                        



             770)                                                

 

             ALBUMIN (BEAKER) 3.3 g/dL     3.5-5.0      L            



             (test code = 1145)                                        

 

             ALKALINE PHOSPHATASE 89 U/L                           



             (BEAKER) (test code =                                        



             346)                                                

 

             BILIRUBIN TOTAL 1.4 mg/dL    0.2-1.2      H            



             (BEAKER) (test code =                                        



             377)                                                

 

             SODIUM (BEAKER) (test 137 meq/L    136-145                   



             code = 381)                                         

 

             POTASSIUM (BEAKER) 3.7 meq/L    3.5-5.1                   



             (test code = 379)                                        

 

             CHLORIDE (BEAKER) 108 meq/L           H            



             (test code = 382)                                        

 

             CO2 (BEAKER) (test 17 meq/L     22-29        L            



             code = 355)                                         

 

             BLOOD UREA NITROGEN 12 mg/dL     7-21                      



             (BEAKER) (test code =                                        



             354)                                                

 

             CREATININE (BEAKER) 0.78 mg/dL   0.57-1.25                 



             (test code = 358)                                        

 

             GLUCOSE RANDOM 119 mg/dL           H            



             (BEAKER) (test code =                                        



             652)                                                

 

             CALCIUM (BEAKER) 8.6 mg/dL    8.4-10.2                  



             (test code = 697)                                        

 

             AST (SGOT) (BEAKER) 13 U/L       5-34                      



             (test code = 353)                                        

 

             ALT (SGPT) (BEAKER) 28 U/L       6-55                      



             (test code = 347)                                        

 

             EGFR (BEAKER) (test 141                                    ESTIMATE

D GFR IS



             code = 1092) mL/min/1.73 sq                           NOT AS ACCURA

TE AS



                          m                                      CREATININE



                                                                 CLEARANCE IN



                                                                 PREDICTING



                                                                 GLOMERULAR



                                                                 FILTRATION RATE

.



                                                                 ESTIMATED GFR I

S



                                                                 NOT APPLICABLE 

FOR



                                                                 DIALYSIS PATIEN

TS.



CBC W/PLT COUNT &amp; AUTO [id number] 08:46:00





             Test Item    Value        Reference Range Interpretation Comments

 

             WHITE BLOOD CELL COUNT (BEAKER) 9.4 K/ L     4.0-10.0              

    



             (test code = 775)                                        

 

             RED BLOOD CELL COUNT (BEAKER) 4.79 M/ L    4.20-5.80               

  



             (test code = 761)                                        

 

             HEMOGLOBIN (BEAKER) (test code = 12.8 GM/DL   13.0-16.8    L       

     



             410)                                                

 

             HEMATOCRIT (BEAKER) (test code = 40.0 %       40.0-50.0            

     



             411)                                                

 

             MEAN CORPUSCULAR VOLUME (BEAKER) 83.4 fL      82.0-98.0            

     



             (test code = 753)                                        

 

             MEAN CORPUSCULAR HEMOGLOBIN 26.7 pg      27.0-33.0    L            



             (BEAKER) (test code = 751)                                        

 

             MEAN CORPUSCULAR HEMOGLOBIN CONC 32.0 GM/DL   32.0-36.0            

     



             (BEAKER) (test code = 752)                                        

 

             RED CELL DISTRIBUTION WIDTH 18.2 %       10.3-14.2    H            



             (BEAKER) (test code = 412)                                        

 

             PLATELET COUNT (BEAKER) (test 313 K/CU MM  150-430                 

  



             code = 756)                                         

 

             MEAN PLATELET VOLUME (BEAKER) 8.6 fL       6.5-10.5                

  



             (test code = 754)                                        

 

             NUCLEATED RED BLOOD CELLS 0 /100 WBC   0-0                       



             (BEAKER) (test code = 413)                                        

 

             NEUTROPHILS RELATIVE PERCENT 70 %                                  

 



             (BEAKER) (test code = 429)                                        

 

             LYMPHOCYTES RELATIVE PERCENT 16 %                                  

 



             (BEAKER) (test code = 430)                                        

 

             MONOCYTES RELATIVE PERCENT 12 %                                   



             (BEAKER) (test code = 431)                                        

 

             EOSINOPHILS RELATIVE PERCENT 1 %                                   

 



             (BEAKER) (test code = 432)                                        

 

             BASOPHILS RELATIVE PERCENT 1 %                                    



             (BEAKER) (test code = 437)                                        

 

             NEUTROPHILS ABSOLUTE COUNT 6.54 K/ L    1.80-8.00                 



             (BEAKER) (test code = 670)                                        

 

             LYMPHOCYTES ABSOLUTE COUNT 1.50 K/ L    1.48-4.50                 



             (BEAKER) (test code = 414)                                        

 

             MONOCYTES ABSOLUTE COUNT (BEAKER) 1.13 K/ L    0.00-1.30           

      



             (test code = 415)                                        

 

             EOSINOPHILS ABSOLUTE COUNT 0.13 K/ L    0.00-0.50                 



             (BEAKER) (test code = 416)                                        

 

             BASOPHILS ABSOLUTE COUNT (BEAKER) 0.06 K/ L    0.00-0.20           

      



             (test code = 417)                                        



0.00URINALYSIS W/ [id number] 20:36:00





             Test Item    Value        Reference Range Interpretation Comments

 

             COLOR (BEAKER) (test code = 470) Yellow                            

     

 

             CLARITY (BEAKER) (test code = 469) Hazy                            

       

 

             SPECIFIC GRAVITY UA (BEAKER) (test 1.012        1.001-1.035        

       



             code = 468)                                         

 

             PH UA (BEAKER) (test code = 467) 5.5          5.0-8.0              

     

 

             PROTEIN UA (BEAKER) (test code = 100 mg/dL    Negative     A       

     



             464)                                                

 

             GLUCOSE UA (BEAKER) (test code = Negative     Negative             

     



             365)                                                

 

             KETONES UA (BEAKER) (test code = Negative     Negative             

     



             371)                                                

 

             BILIRUBIN UA (BEAKER) (test code = Negative     Negative           

       



             462)                                                

 

             BLOOD UA (BEAKER) (test code = 461) Moderate     Negative     A    

        

 

             NITRITE UA (BEAKER) (test code = Negative     Negative             

     



             465)                                                

 

             LEUKOCYTE ESTERASE UA (BEAKER) Large        Negative     A         

   



             (test code = 466)                                        

 

             UROBILINOGEN UA (BEAKER) (test code 3.0 mg/dL    0.2-1.0      H    

        



             = 463)                                              

 

             RBC UA (BEAKER) (test code = 519) 19 /HPF                          

      

 

             WBC UA (BEAKER) (test code = 520) 182 /HPF                         

      

 

             SOURCE(BEAKER) (test code = 2795)                                  

      



BASIC METABOLIC [id number] 17:00:00





             Test Item    Value        Reference Range Interpretation Comments

 

             SODIUM (BEAKER) 140 meq/L    136-145                   



             (test code = 381)                                        

 

             POTASSIUM (BEAKER) 3.7 meq/L    3.5-5.1                   



             (test code = 379)                                        

 

             CHLORIDE (BEAKER) 112 meq/L           H            



             (test code = 382)                                        

 

             CO2 (BEAKER) (test 17 meq/L     22-29        L            



             code = 355)                                         

 

             BLOOD UREA NITROGEN 23 mg/dL     7-21         H            



             (BEAKER) (test code                                        



             = 354)                                              

 

             CREATININE (BEAKER) 1.00 mg/dL   0.57-1.25                 



             (test code = 358)                                        

 

             GLUCOSE RANDOM 102 mg/dL                        



             (BEAKER) (test code                                        



             = 652)                                              

 

             CALCIUM (BEAKER) 8.7 mg/dL    8.4-10.2                  



             (test code = 697)                                        

 

             EGFR (BEAKER) (test 106 mL/min/1.73                           ESTIM

ATED GFR IS



             code = 1092) sq m                                   NOT AS ACCURATE

 AS



                                                                 CREATININE



                                                                 CLEARANCE IN



                                                                 PREDICTING



                                                                 GLOMERULAR



                                                                 FILTRATION RATE

.



                                                                 ESTIMATED GFR I

S



                                                                 NOT APPLICABLE 

FOR



                                                                 DIALYSIS PATIEN

TS.



Specimen slightly ictericCBC W/PLT COUNT &amp; AUTO [id number] 
12:18:00





             Test Item    Value        Reference Range Interpretation Comments

 

             WHITE BLOOD CELL COUNT (BEAKER) 16.4 K/ L    4.0-10.0     H        

    



             (test code = 775)                                        

 

             RED BLOOD CELL COUNT (BEAKER) 5.22 M/ L    4.20-5.80               

  



             (test code = 761)                                        

 

             HEMOGLOBIN (BEAKER) (test code = 14.4 GM/DL   13.0-16.8            

     



             410)                                                

 

             HEMATOCRIT (BEAKER) (test code = 42.9 %       40.0-50.0            

     



             411)                                                

 

             MEAN CORPUSCULAR VOLUME (BEAKER) 82.2 fL      82.0-98.0            

     



             (test code = 753)                                        

 

             MEAN CORPUSCULAR HEMOGLOBIN 27.5 pg      27.0-33.0                 



             (BEAKER) (test code = 751)                                        

 

             MEAN CORPUSCULAR HEMOGLOBIN CONC 33.5 GM/DL   32.0-36.0            

     



             (BEAKER) (test code = 752)                                        

 

             RED CELL DISTRIBUTION WIDTH 16.2 %       10.3-14.2    H            



             (BEAKER) (test code = 412)                                        

 

             PLATELET COUNT (BEAKER) (test 329 K/CU MM  150-430                 

  



             code = 756)                                         

 

             MEAN PLATELET VOLUME (BEAKER) 8.2 fL       6.5-10.5                

  



             (test code = 754)                                        

 

             NUCLEATED RED BLOOD CELLS 0 /100 WBC   0-0                       



             (BEAKER) (test code = 413)                                        

 

             NEUTROPHILS RELATIVE PERCENT 83 %                                  

 



             (BEAKER) (test code = 429)                                        

 

             LYMPHOCYTES RELATIVE PERCENT 7 %                                   

 



             (BEAKER) (test code = 430)                                        

 

             MONOCYTES RELATIVE PERCENT 9 %                                    



             (BEAKER) (test code = 431)                                        

 

             EOSINOPHILS RELATIVE PERCENT 0 %                                   

 



             (BEAKER) (test code = 432)                                        

 

             BASOPHILS RELATIVE PERCENT 0 %                                    



             (BEAKER) (test code = 437)                                        

 

             NEUTROPHILS ABSOLUTE COUNT 1.62 K/ L    1.80-8.00    L            



             (BEAKER) (test code = 670)                                        

 

             LYMPHOCYTES ABSOLUTE COUNT 1.15 K/ L    1.48-4.50    L            



             (BEAKER) (test code = 414)                                        

 

             MONOCYTES ABSOLUTE COUNT (BEAKER) 1.56 K/ L    0.00-1.30    H      

      



             (test code = 415)                                        

 

             EOSINOPHILS ABSOLUTE COUNT 0.01 K/ L    0.00-0.50                 



             (BEAKER) (test code = 416)                                        

 

             BASOPHILS ABSOLUTE COUNT (BEAKER) 0.02 K/ L    0.00-0.20           

      



             (test code = 417)                                        



(MANUAL DIFFERENTIAL)2017 12:18:00





             Test Item    Value        Reference Range Interpretation Comments

 

             TOTAL COUNTED (BEAKER) (test code =                                

        



             1351)                                               

 

             WBC MORPHOLOGY (BEAKER) (test code = Normal                        

         



             487)                                                

 

             PLT MORPHOLOGY (BEAKER) (test code = Normal                        

         



             486)                                                

 

             RBC MORPHOLOGY (BEAKER) (test code = Normal                        

         



             762)

## 2023-05-04 LAB
ALBUMIN SERPL BCP-MCNC: 2.2 G/DL (ref 3.4–5)
ALP SERPL-CCNC: 172 U/L (ref 45–117)
ALT SERPL W P-5'-P-CCNC: 33 U/L (ref 16–61)
AST SERPL W P-5'-P-CCNC: 13 U/L (ref 15–37)
BUN BLD-MCNC: 27 MG/DL (ref 7–18)
GLUCOSE SERPLBLD-MCNC: 157 MG/DL (ref 74–106)
HCT VFR BLD CALC: 46.7 % (ref 39.6–49)
LYMPHOCYTES # SPEC AUTO: 1.3 K/UL (ref 0.7–4.9)
MCV RBC: 87.9 FL (ref 80–100)
PMV BLD: 8.5 FL (ref 7.6–11.3)
POTASSIUM SERPL-SCNC: 4.9 MEQ/L (ref 3.5–5.1)
RBC # BLD: 5.31 M/UL (ref 4.33–5.43)

## 2023-05-04 RX ADMIN — HYDROMORPHONE HYDROCHLORIDE PRN MG: 2 INJECTION INTRAMUSCULAR; INTRAVENOUS; SUBCUTANEOUS at 15:41

## 2023-05-04 RX ADMIN — SODIUM CHLORIDE SCH MLS: 0.9 INJECTION, SOLUTION INTRAVENOUS at 12:55

## 2023-05-04 RX ADMIN — SODIUM CHLORIDE SCH MLS: 9 INJECTION, SOLUTION INTRAVENOUS at 17:07

## 2023-05-04 RX ADMIN — SODIUM CHLORIDE SCH MLS: 0.9 INJECTION, SOLUTION INTRAVENOUS at 22:31

## 2023-05-04 RX ADMIN — INSULIN GLARGINE SCH UNIT: 100 INJECTION, SOLUTION SUBCUTANEOUS at 08:50

## 2023-05-04 RX ADMIN — HYDROMORPHONE HYDROCHLORIDE PRN MG: 2 INJECTION INTRAMUSCULAR; INTRAVENOUS; SUBCUTANEOUS at 21:03

## 2023-05-04 RX ADMIN — Medication SCH PKT: at 20:29

## 2023-05-04 RX ADMIN — HUMAN INSULIN SCH UNIT: 100 INJECTION, SOLUTION SUBCUTANEOUS at 20:28

## 2023-05-04 RX ADMIN — HYDROMORPHONE HYDROCHLORIDE PRN MG: 4 TABLET ORAL at 08:50

## 2023-05-04 RX ADMIN — COLLAGENASE SANTYL SCH APPL: 250 OINTMENT TOPICAL at 15:45

## 2023-05-04 RX ADMIN — SODIUM CHLORIDE SCH: 0.9 INJECTION, SOLUTION INTRAVENOUS at 19:45

## 2023-05-04 RX ADMIN — HUMAN INSULIN SCH UNIT: 100 INJECTION, SOLUTION SUBCUTANEOUS at 17:07

## 2023-05-04 RX ADMIN — SODIUM CHLORIDE SCH: 0.9 INJECTION, SOLUTION INTRAVENOUS at 08:51

## 2023-05-04 NOTE — P.CNS
Date of Consult: 05/04/23


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


Requesting Physician: Domingo Mc


Primary Care Provider: LIDA


Chief Complaint: hyperglycemia 


History of Present Illness: 


37-year-old gentleman known to me from prior consult 1/26/2023, wheelchair-bound

with spina bifida, hydrocephalus, IDDM, GERD, and paraplegia with chronic 

indwelling suprapubic catheter managed at [facility] inconsistently with 

recurrent colonization versus UTIs with highly resistant organisms.  I changed 

his suprapubic catheter last on 1/26/2023, and I recommended follow-up with me 

in the urology clinic for cystoscopy to evaluate for bladder calculus underlying

the recurrent infections.  Renal ultrasound was also recommended to rule out 

upper tract calculus as the source of recurrent UTIs but also to rule out 

hydronephrosis from obstruction.  Otherwise I recommended he seek follow-up with

his urologist at [facility] and to ensure evaluation.  It is unclear whether 

additional evaluation was performed at [facility], but he explained he has 

had the suprapubic catheter is exchanged and the has had more issues lately with

significant pericatheter leakage of urine.  This apparently has resulted in 

progressive worsening and deterioration of the skin of his scrotum, perineum, 

and decubitus ulcers.  He was apparently seen in the emergency department 

yesterday with a cough and found to have a blood sugar in the 900s.  He was 

apparently not monitoring and managing his diabetes well and drinking a lot of 

sugary beverages.  He was admitted and given an insulin drip.  His pressure 

ulcer has been treated at the wound care center, but he had not been going.  And

I was consulted regarding management of his suprapubic catheter and the leakage 

of urine worsening his ulcerative conditions.





Examination:


Patient at his usual state in no acute distress


Afebrile


Discomfort with lifting of his abdominal pannus per routine


Suprapubic catheter inserted and apparently in reasonable position and draining 

some urine into a leg bag, but significant urine leaking from the phallus 

draining into his scrotal perineal region.


Scrotum erythematous with some skin maceration ventral posteriorly, and the 

perineal scrotal region with necrotic skin change occurring and areas of 

ulcerative formation within the perineum extending into the decubitus region 

noted.


Deep tissue palpation and manipulation performed, and no crepitus or significant

blanching erythema consistent with cellulitis noted.





As a result, recognizing the skin superficially within the scrotum and perineum 

was largely being macerated by bathing and a moist colonized/infected urine bath

constantly associated with the fecal jaime coming from his anus was contributing

to superficial necrosis and risk of necrotizing infection.  So I recommended 

efforts at wound care to dry the area and placement of a urethral catheter to 

eliminate the incontinence of urine causing moisture in that region.





Wound care and urethral Ortega catheter placement as well as suprapubic catheter 

manipulation and replacement procedure note:


With the assistance of 2 nurses, the patient was rotated onto his left side and 

to expose his backside and perineal region.  We were able to wipe and clean the 

area free of any associated urine, and then we applied Aquacel silver dressing 

all around the perineal scrotal region with ABD pads to absorb any surrounding 

moisture and dry the area.  The patient was then rotated back onto his back, and

additional Aquacel silver was placed around the anterior component of the 

scrotal skin that was macerated along with ABD pads to again try to dry that 

area.  Because urine was still actively leaking from the meatus, I then prepped 

the meatus with Betadine and draped in standard fashion before passing an 18 

[place] Ortega catheter through the urethra and putatively in his bladder.  

Because there was clear evidence of contraction of his bladder with significant 

spasms and a degree of discomfort associated with efforts to irrigate the 

suprapubic tube, it was unclear to me how successful I was at placement of the 

urethral Ortega except that when I would attempt to irrigate the suprapubic 

catheter, the urine would drain appropriately from the Ortega catheter.  I 

additionally deflated the suprapubic catheter balloon and advanced it further 

into his bladder before reinflating the balloon minimally and seeding the 

balloon at the entry into the bladder by inflating it further as I retracted the

suprapubic catheter to ensure its appropriate positioning.  Fluid instilled via 

the suprapubic catheter again irrigated through and out the penis or the newly 

placed Ortega catheter.





Once both catheters were in place and placed to drainage, this did abrogate the 

urethral discharge of urine, and fresh ABD pads were placed to ensure the area 

with dry completely.





Recommendation:


-CT scan of the pelvis and perineum which may be done with or just without IV 

contrast to assess for air in the tissues , drainable collection that may 

require surgical intervention, and to ensure both the urethral catheter balloon 

and suprapubic catheter balloon are appropriately situated within his contracted

bladder.  


If no drainable collection or air in the tissues consistent with Isaac's 

gangrene, local wound care to keep the skin dry but with antimicrobial dressings

applied against it to allow the superficial necrotic tissue to debride and to 

reepithelialize with healthy tissue as what I would recommend at this time.


-We will monitor for progressive deterioration of his perineal scrotal wound 

situation to determine if surgical intervention may eventually become necessary.


-Meanwhile oxybutynin XL 5 to 10 mg daily would be helpful to manage his bladder

contraction and spasms potentially causing him a degree of discomfort as long as

he is not on any long-acting potassium oral supplementation.


Allergies





levofloxacin [From Levaquin] Allergy (Intermediate, Verified 01/24/23 22:50)


   Hives


morphine Allergy (Intermediate, Verified 01/24/23 22:50)


   Hives


sulfamethoxazole [From Bactrim] Allergy (Intermediate, Verified 01/24/23 22:50)


   Hives


ketorolac tromethamine [From Toradol] Allergy (Verified 01/24/23 22:50)


   Nausea/Vomiting


Penicillins Allergy (Verified 01/24/23 22:50)


   Hives/Rash


vancomycin Allergy (Verified 01/24/23 22:50)


   Hives


ondansetron [From Zofran (as hydrochloride)] Adverse Reaction (Mild, Verified 

01/24/23 22:50)


   Nausea/Vomiting


amoxicillin [From Augmentin] Adverse Reaction (Verified 01/24/23 22:50)


   Hives/Rash


ciprofloxacin Adverse Reaction (Verified 01/24/23 22:50)


   Nausea/Vomiting


doxycycline Adverse Reaction (Verified 01/24/23 22:50)


   Nausea/Vomiting


sesame seed Adverse Reaction (Verified 01/24/23 22:50)


   diarrhea


pop corn Adverse Reaction (Severe, Uncoded 01/24/23 22:50)


   diarrhea





Home medications list reviewed: Yes


Home Medications: 








Hydromorphone [Dilaudid] 4 mg PO Q8HP PRN 03/14/22 


Lisinopril [Zestril] 2.5 mg PO DAILY 01/24/23 


Insulin Glargine,Hum.rec.anlog [Semglee] 45 unit SQ DAILY 01/30/23 








- Past Medical/Surgical History


Diabetic: Yes


-: Spina Bifida with hydrocephalus


-: Depression


-: Insomnia


-: suprapubic catheter


-: GERD


-: Chronic pain syndrome


-: Muscle wasting


-: Paraplegia


-: Shunt revision


-: Cholecystectomy


-: Foot surgery


-: Hip sx BL


-: Bilateral hip surgery


-: Appendectomy


Psychosocial/ Personal History: Patient currently single.  He has no children.  

He is disabled.





- Family History


  ** Father


Medical History: Hypertension





  ** Mother


Medical History: Hypertension





- Social History


Smoking Status: Unknown if ever smoked


Alcohol use: Yes


CD- Drugs: No


Caffeine use: No


Place of Residence: Home





Physical Examination














Temp Pulse Resp BP Pulse Ox


 


 97.8 F   93 H  18   111/72   95 


 


 05/04/23 20:00  05/04/23 20:00  05/04/23 20:00  05/04/23 20:00  05/04/23 20:00











- Problems


(1) Scrotal disorder


Current Visit: Yes   Status: Acute   





(2) Painful bladder spasm


Current Visit: Yes   Status: Acute   





(3) Chronic suprapubic catheter


Current Visit: Yes   Status: Chronic   





(4) Pressure ulcer of unspecified site, unspecified stage


Current Visit: Yes   Status: Chronic   


Qualifiers: 


   Pressure injury location: ankle   Pressure injury stage: stage 2   

Laterality: unspecified laterality   Qualified Code(s): L89.502 - Pressure ulcer

 of unspecified ankle, stage 2   





(5) Complicated UTI (urinary tract infection)


Current Visit: No   Status: Acute   


Conclusions/Impression: 


see A/P in HPI


Time Spent Managing Pts care (In Minutes): 60 (75mins total time with >60mins 

F2F and management)

## 2023-05-04 NOTE — P.PN
Date of Service: 05/04/23





wound care called with the patient wounds.  have discussed it with Dr. Johnson 

and Dr. Gordon.  will start the patient on zosyn for possible gangrene

## 2023-05-04 NOTE — P.HP
Certification for Inpatient


Patient admitted to: Inpatient


With expected LOS: >2 Midnights


Patient will require the following post-hospital care: Home Health Services


Practitioner: I am a practitioner with admitting privileges, knowledge of 

patient current condition, hospital course, and medical plan of care.


Services: Services provided to patient in accordance with Admission requirements

found in Title 42 Section 412.3 of the Code of Federal Regulations





Patient History


Date of Service: 05/04/23


Primary Care Provider: LIDA


Reason for admission: hyperglycemia 


History of Present Illness: 





Patient is a well know patient with spina bifida and dm2.  He stopped taking his

insulin.   States for 2 days, he usually underreports this.  Has numerous 

hospitalizations here and in [facility] for this reason.  He is poor for 

follow up.  Or calling the office for diabetic supplies.  Came into the ER With 

a cough yesterday.  Found to have a sugar in the 900's   States he was not 

taking his sugar and drinking lemonade 


Allergies





levofloxacin [From Levaquin] Allergy (Intermediate, Verified 01/24/23 22:50)


   Hives


morphine Allergy (Intermediate, Verified 01/24/23 22:50)


   Hives


sulfamethoxazole [From Bactrim] Allergy (Intermediate, Verified 01/24/23 22:50)


   Hives


ketorolac tromethamine [From Toradol] Allergy (Verified 01/24/23 22:50)


   Nausea/Vomiting


Penicillins Allergy (Verified 01/24/23 22:50)


   Hives/Rash


vancomycin Allergy (Verified 01/24/23 22:50)


   Hives


ondansetron [From Zofran (as hydrochloride)] Adverse Reaction (Mild, Verified 

01/24/23 22:50)


   Nausea/Vomiting


amoxicillin [From Augmentin] Adverse Reaction (Verified 01/24/23 22:50)


   Hives/Rash


ciprofloxacin Adverse Reaction (Verified 01/24/23 22:50)


   Nausea/Vomiting


doxycycline Adverse Reaction (Verified 01/24/23 22:50)


   Nausea/Vomiting


sesame seed Adverse Reaction (Verified 01/24/23 22:50)


   diarrhea


pop corn Adverse Reaction (Severe, Uncoded 01/24/23 22:50)


   diarrhea





Home Medications: 








Hydromorphone [Dilaudid] 4 mg PO Q8HP PRN 03/14/22 


Lisinopril [Zestril] 2.5 mg PO DAILY 01/24/23 


Insulin Glargine,Hum.rec.anlog [Semglee] 45 unit SQ DAILY 01/30/23 








- Past Medical/Surgical History


Has patient received pneumonia vaccine in the past: No


Diabetic: Yes


-: Spina Bifida with hydrocephalus


-: Depression


-: Insomnia


-: suprapubic catheter


-: GERD


-: Chronic pain syndrome


-: Muscle wasting


-: Paraplegia


-: Shunt revision


-: Cholecystectomy


-: Foot surgery


-: Hip sx BL


-: Bilateral hip surgery


-: Appendectomy


Psychosocial/ Personal History: Patient currently single.  He has no children.  

He is disabled.





- Family History


  ** Father


-: Hypertension





  ** Mother


-: Hypertension





- Social History


Smoking Status: Current every day smoker


Alcohol use: Yes


CD- Drugs: No


Caffeine use: No


Place of Residence: Home





Review of Systems


10-point ROS is otherwise unremarkable


General: Weakness





Physical Examination





- Vital Signs


Temperature: 98.7 F


Blood Pressure: 121/77


Pulse: 102


Respirations: 33


Pulse Ox (%): 95





- Physical Exam


General: Alert, In no apparent distress


HEENT: Atraumatic, PERRLA, Mucous membr. moist/pink, EOMI, Sclerae nonicteric


Neck: Supple, 2+ carotid pulse no bruit, No LAD, Without JVD or thyroid 

abnormality


Respiratory: Clear to auscultation bilaterally, Normal air movement


Cardiovascular: Regular rate/rhythm, Normal S1 S2


Gastrointestinal: Normal bowel sounds, No tenderness


Musculoskeletal: No tenderness


Integumentary: No rashes


Neurological: Normal gait, Normal speech, Normal strength at 5/5 x4 extr, Normal

 tone, Normal affect


Lymphatics: No axilla or inguinal lymphadenopathy





- Studies


Laboratory Data (last 24 hrs)





05/03/23 15:56: WBC 10.70, Hgb 15.1, Hct 45.9, Plt Count 371


05/03/23 15:56: Sodium 130 L, Potassium 5.0, BUN 50 H, Creatinine 1.77 H, 

Glucose 960 H*








Assessment and Plan





- Problems (Diagnosis)


(1) Hyperosmolar non-ketotic state due to type 2 diabetes mellitus


Current Visit: No   Status: Acute   


Plan: 


due to non compliance with insulin.  I had a talk with him this morning.   He 

may need therapy.  He has a  long history of non compliance.  This can be 

frustrating to his providers.  More importantly this behavior is most likely 

shortening his life expectancy.  





His sugars this morning are in the 150's   Stop the insulin drip.   Start him on

 sq insulin and start feeding him 








(2) Chronic suprapubic catheter


Current Visit: Yes   Status: Chronic   


Plan: 


Is leaking.  Will consult Dr. Gordon 








(3) Chronic pain disorder


Onset Date: 11/03/17   Current Visit: No   Status: Chronic   


Plan: 


restart his hydromorphine 








(4) Spina bifida


Onset Date: 11/03/17   Current Visit: No   Status: Chronic   


Plan: 


Wheelchair bound 


Qualifiers: 


   Spinal region: lumbar 





(5) Pressure ulcer of unspecified site, unspecified stage


Current Visit: Yes   Status: Chronic   


Plan: 


have been treating in the wound care center.  The patient has not been coming.  

Will start him on Santyl and consult wound center. 


Qualifiers: 


   Pressure injury location: ankle   Pressure injury stage: stage 2   

Laterality: unspecified laterality   Qualified Code(s): L89.502 - Pressure ulcer

 of unspecified ankle, stage 2   





- Advance Directives


Does patient have a Living Will: No


Does patient have a Durable POA for Healthcare: No





- Code Status/Comfort Care


Code Status Assessed: Yes


Code Status: Full Code


Critical Care: No


Time Spent Managing Pts Care (In Minutes): 45

## 2023-05-05 RX ADMIN — HYDROMORPHONE HYDROCHLORIDE PRN MG: 2 INJECTION INTRAMUSCULAR; INTRAVENOUS; SUBCUTANEOUS at 03:24

## 2023-05-05 RX ADMIN — Medication SCH APPL: at 07:59

## 2023-05-05 RX ADMIN — SODIUM CHLORIDE SCH MLS: 0.9 INJECTION, SOLUTION INTRAVENOUS at 19:04

## 2023-05-05 RX ADMIN — HUMAN INSULIN SCH UNIT: 100 INJECTION, SOLUTION SUBCUTANEOUS at 07:58

## 2023-05-05 RX ADMIN — SODIUM CHLORIDE SCH MLS: 9 INJECTION, SOLUTION INTRAVENOUS at 16:39

## 2023-05-05 RX ADMIN — HUMAN INSULIN SCH UNIT: 100 INJECTION, SOLUTION SUBCUTANEOUS at 20:30

## 2023-05-05 RX ADMIN — SODIUM CHLORIDE SCH MLS: 9 INJECTION, SOLUTION INTRAVENOUS at 08:00

## 2023-05-05 RX ADMIN — Medication SCH: at 08:00

## 2023-05-05 RX ADMIN — HYDROMORPHONE HYDROCHLORIDE PRN MG: 2 INJECTION INTRAMUSCULAR; INTRAVENOUS; SUBCUTANEOUS at 09:17

## 2023-05-05 RX ADMIN — HUMAN INSULIN SCH UNIT: 100 INJECTION, SOLUTION SUBCUTANEOUS at 12:06

## 2023-05-05 RX ADMIN — HUMAN INSULIN SCH UNIT: 100 INJECTION, SOLUTION SUBCUTANEOUS at 16:39

## 2023-05-05 RX ADMIN — INSULIN GLARGINE SCH UNIT: 100 INJECTION, SOLUTION SUBCUTANEOUS at 08:00

## 2023-05-05 RX ADMIN — COLLAGENASE SANTYL SCH APPL: 250 OINTMENT TOPICAL at 08:00

## 2023-05-05 RX ADMIN — PROMETHAZINE HYDROCHLORIDE PRN MG: 25 INJECTION INTRAMUSCULAR; INTRAVENOUS at 12:23

## 2023-05-05 RX ADMIN — SODIUM CHLORIDE SCH MLS: 0.9 INJECTION, SOLUTION INTRAVENOUS at 08:37

## 2023-05-05 RX ADMIN — SODIUM CHLORIDE SCH MLS: 9 INJECTION, SOLUTION INTRAVENOUS at 00:18

## 2023-05-05 RX ADMIN — MUPIROCIN SCH APPL: 20 OINTMENT TOPICAL at 20:29

## 2023-05-05 RX ADMIN — HYDROMORPHONE HYDROCHLORIDE PRN MG: 2 INJECTION INTRAMUSCULAR; INTRAVENOUS; SUBCUTANEOUS at 20:31

## 2023-05-05 RX ADMIN — HYDROMORPHONE HYDROCHLORIDE PRN MG: 2 INJECTION INTRAMUSCULAR; INTRAVENOUS; SUBCUTANEOUS at 15:13

## 2023-05-05 RX ADMIN — Medication SCH PKT: at 20:29

## 2023-05-05 RX ADMIN — MUPIROCIN SCH APPL: 20 OINTMENT TOPICAL at 07:59

## 2023-05-05 RX ADMIN — PROMETHAZINE HYDROCHLORIDE PRN MG: 25 INJECTION INTRAMUSCULAR; INTRAVENOUS at 22:25

## 2023-05-05 NOTE — EKG
Test Date:    2023-05-03               Test Time:    16:02:17

Technician:   JING                                    

                                                     

MEASUREMENT RESULTS:                                       

Intervals:                                           

Rate:         98                                     

NE:           136                                    

QRSD:         96                                     

QT:           326                                    

QTc:          416                                    

Axis:                                                

P:            77                                     

NE:           136                                    

QRS:          75                                     

T:            21                                     

                                                     

INTERPRETIVE STATEMENTS:                                       

                                                     

Normal sinus rhythm

Possible Inferior infarct, age undetermined

Abnormal ECG

Compared to ECG 12/14/2022 16:54:54

Sinus arrhythmia no longer present

Myocardial infarct finding still present



Electronically Signed On 05-05-23 05:48:58 CDT by Cheng Anglin

## 2023-05-05 NOTE — RAD REPORT
EXAM DESCRIPTION:  CT - Abdomen   Pelvis Wo Contrast - 5/5/2023 9:06 am

 

CLINICAL HISTORY:  perineum wounds, suprapubic cath placement

 

COMPARISON:  Abdomen   Pelvis Wo Contrast dated 11/25/2022; Abdomen   Pelvis W Contrast dated 3/14/20
22; Abdomen   Pelvis Wo Contrast dated 11/22/2021; Abdomen   Pelvis W Contrast dated 11/2/2021; Head 
C Spine Cap Wo Con dated 10/2/2022

 

TECHNIQUE:  Thin cut axial CT imaging of the abdomen and pelvis was performed without IV contrast. Mu
ltiplanar reformats were generated and reviewed.

 

All CT scans are performed using dose optimization technique as appropriate and may include automated
 exposure control or mA/KV adjustment according to patient size.

 

FINDINGS:  No suspicious findings in the lung bases.

 

The liver, spleen, and pancreas show no suspicious findings. Status post cholecystectomy. No findings
 to suggest intra or extrahepatic biliary ductal dilation.

 

New mild right hydroureteronephrosis with ureteric dilation to the level of the right of the ureterov
esical junction. No radiopaque calculi. No suspicious contour abnormality within limits of noncontras
t evaluation.

 

No dilated bowel loops or bowel wall thickening. No free air, free fluid or inflammatory stranding. N
o hernia, mass or bulky lymphadenopathy. The urinary bladder is decompressed with Ortega and suprapubi
c catheters in place, which limits evaluation, however thickening of the bladder wall is again noted.
 Focal thickening along the right lower anterior abdominal wall muscles, just above the suprapubic ca
theter, not significantly changed in degree, although some central hypoattenuation is now noted. Plea
se see sagittal image 75 and axial image 80.

 

Peritoneal portion of a  shunt catheter is present.

 

No suspicious bony findings.

 

Tract like soft tissue thickening extending from a sacral ulcer left of midline, to the level of the 
posterior perirectal space and posterior wall of the anal canal is stable. Soft tissue thickening and
 edema at the level of the perineum, withdraw/dressing, however no deep subcutaneous soft tissue fat 
stranding, appreciable fluid collections, or gas are noted.

 

IMPRESSION:  New mild right hydroureteronephrosis, without evidence of an obstructing calculus. Findi
ngs may relate to an ascending infection.

 

New central hypoattenuation along a small region of soft tissue thickening involving the right lower 
anterior paramidline abdominal wall muscles, could relate to a developing small collection or abscess
.

 

Other stable findings, as above.

 

 

 

 The right urinary tract findings were communicated to Domingo Mc on 5/5/2023 at 09:35 hours.

## 2023-05-05 NOTE — P.PN
Subjective


Date of Service: 05/05/23


Primary Care Provider: LIDA


Chief Complaint: hyperglycemia 


Subjective: No new changes





Review of Systems


10-point ROS is otherwise unremarkable


General: Weakness, Malaise





Physical Examination





- Vital Signs


Temperature: 97.6 F


Blood Pressure: 100/49


Pulse: 71


Respirations: 13


Pulse Ox (%): 96





- Physical Exam


General: Alert, In no apparent distress


HEENT: Atraumatic, PERRLA, EOMI


Neck: Supple, JVD not distended


Respiratory: Clear to auscultation bilaterally, Normal air movement


Cardiovascular: Regular rate/rhythm, Normal S1 S2


Gastrointestinal: Normal bowel sounds, No tenderness


Musculoskeletal: No tenderness


Integumentary: No rashes


Neurological: Normal speech, Normal tone, Normal affect


Lymphatics: No axilla or inguinal lymphadenopathy





Assessment And Plan





- Current Problems (Diagnosis)


(1) Pressure ulcer of unspecified site, unspecified stage


Current Visit: Yes   Status: Chronic   


Plan: 


Patient seen by Dr Gordon and Dr. Johnson.  No need for scrotal debridgment.  

Dr Johnson would like to do the sacral wound.  However would like to do a 

colectomy.  this is in the interest of letting the wound heal without chronic 

fecal contamination.   Redd has a long history of difficulty with his hygiene 

in this regard  


Qualifiers: 


   Pressure injury location: ankle   Pressure injury stage: stage 2   

Laterality: unspecified laterality   Qualified Code(s): L89.502 - Pressure ulcer

of unspecified ankle, stage 2   





(2) Hyperosmolar non-ketotic state due to type 2 diabetes mellitus


Current Visit: No   Status: Acute   


Plan: 


due to non compliance with insulin.  I had a talk with him this morning.   He 

may need therapy.  He has a  long history of non compliance.  This can be 

frustrating to his providers.  More importantly this behavior is most likely 

shortening his life expectancy.  





His sugars this morning are in the 150's   Stop the insulin drip.   Start him on

sq insulin and start feeding him 








(3) Chronic suprapubic catheter


Current Visit: Yes   Status: Chronic   


Plan: 


Is leaking.  Will consult Dr. Gordon 








(4) Chronic pain disorder


Onset Date: 11/03/17   Current Visit: No   Status: Chronic   


Plan: 


restart his hydromorphine 








(5) Spina bifida


Onset Date: 11/03/17   Current Visit: No   Status: Chronic   


Plan: 


Wheelchair bound 


Qualifiers: 


   Spinal region: lumbar 


Discharge Plan: Home


Plan to discharge in: Greater than 2 days





- Code Status/Comfort Care


Code Status Assessed: No


Physician Review: Patient Assessed, Agree with Above Assessment and Plan


Critical Care: No


Time Spent Managing PTS Care (In Minutes): 30

## 2023-05-06 LAB
ALBUMIN SERPL BCP-MCNC: 1.7 G/DL (ref 3.4–5)
ALP SERPL-CCNC: 146 U/L (ref 45–117)
ALT SERPL W P-5'-P-CCNC: 66 U/L (ref 16–61)
AST SERPL W P-5'-P-CCNC: 39 U/L (ref 15–37)
BUN BLD-MCNC: 12 MG/DL (ref 7–18)
GLUCOSE SERPLBLD-MCNC: 188 MG/DL (ref 74–106)
HCT VFR BLD CALC: 38.2 % (ref 39.6–49)
LYMPHOCYTES # SPEC AUTO: 1.7 K/UL (ref 0.7–4.9)
MCV RBC: 87.9 FL (ref 80–100)
PMV BLD: 7.6 FL (ref 7.6–11.3)
POTASSIUM SERPL-SCNC: 3.7 MEQ/L (ref 3.5–5.1)
RBC # BLD: 4.34 M/UL (ref 4.33–5.43)

## 2023-05-06 RX ADMIN — HUMAN INSULIN SCH UNIT: 100 INJECTION, SOLUTION SUBCUTANEOUS at 20:31

## 2023-05-06 RX ADMIN — MUPIROCIN SCH APPL: 20 OINTMENT TOPICAL at 08:19

## 2023-05-06 RX ADMIN — HYDROMORPHONE HYDROCHLORIDE PRN MG: 2 INJECTION INTRAMUSCULAR; INTRAVENOUS; SUBCUTANEOUS at 03:35

## 2023-05-06 RX ADMIN — SODIUM CHLORIDE SCH MLS: 0.9 INJECTION, SOLUTION INTRAVENOUS at 06:01

## 2023-05-06 RX ADMIN — HUMAN INSULIN SCH UNIT: 100 INJECTION, SOLUTION SUBCUTANEOUS at 08:20

## 2023-05-06 RX ADMIN — COLLAGENASE SANTYL SCH APPL: 250 OINTMENT TOPICAL at 08:19

## 2023-05-06 RX ADMIN — SODIUM CHLORIDE SCH MLS: 9 INJECTION, SOLUTION INTRAVENOUS at 00:42

## 2023-05-06 RX ADMIN — PROMETHAZINE HYDROCHLORIDE PRN MG: 25 INJECTION INTRAMUSCULAR; INTRAVENOUS at 03:36

## 2023-05-06 RX ADMIN — HUMAN INSULIN SCH: 100 INJECTION, SOLUTION SUBCUTANEOUS at 07:30

## 2023-05-06 RX ADMIN — Medication SCH PKT: at 20:32

## 2023-05-06 RX ADMIN — HYDROMORPHONE HYDROCHLORIDE PRN MG: 4 TABLET ORAL at 08:18

## 2023-05-06 RX ADMIN — INSULIN GLARGINE SCH UNIT: 100 INJECTION, SOLUTION SUBCUTANEOUS at 20:31

## 2023-05-06 RX ADMIN — MUPIROCIN SCH APPL: 20 OINTMENT TOPICAL at 20:30

## 2023-05-06 RX ADMIN — SODIUM CHLORIDE SCH MLS: 9 INJECTION, SOLUTION INTRAVENOUS at 16:40

## 2023-05-06 RX ADMIN — SODIUM CHLORIDE SCH MLS: 0.9 INJECTION, SOLUTION INTRAVENOUS at 16:41

## 2023-05-06 RX ADMIN — INSULIN GLARGINE SCH UNIT: 100 INJECTION, SOLUTION SUBCUTANEOUS at 08:20

## 2023-05-06 RX ADMIN — Medication SCH PKT: at 08:19

## 2023-05-06 RX ADMIN — HUMAN INSULIN SCH UNIT: 100 INJECTION, SOLUTION SUBCUTANEOUS at 12:18

## 2023-05-06 RX ADMIN — HYDROMORPHONE HYDROCHLORIDE PRN MG: 2 INJECTION INTRAMUSCULAR; INTRAVENOUS; SUBCUTANEOUS at 16:40

## 2023-05-06 RX ADMIN — HYDROMORPHONE HYDROCHLORIDE PRN MG: 2 INJECTION INTRAMUSCULAR; INTRAVENOUS; SUBCUTANEOUS at 22:57

## 2023-05-06 RX ADMIN — PROMETHAZINE HYDROCHLORIDE PRN MG: 25 INJECTION INTRAMUSCULAR; INTRAVENOUS at 20:29

## 2023-05-06 RX ADMIN — HUMAN INSULIN SCH UNIT: 100 INJECTION, SOLUTION SUBCUTANEOUS at 16:41

## 2023-05-06 RX ADMIN — Medication SCH APPL: at 08:20

## 2023-05-06 RX ADMIN — HYDROMORPHONE HYDROCHLORIDE PRN MG: 2 INJECTION INTRAMUSCULAR; INTRAVENOUS; SUBCUTANEOUS at 11:03

## 2023-05-06 RX ADMIN — SODIUM CHLORIDE SCH MLS: 9 INJECTION, SOLUTION INTRAVENOUS at 08:20

## 2023-05-06 NOTE — P.PN
Subjective


Date of Service: 05/06/23


Primary Care Provider: [facility]


Chief Complaint: hyperglycemia 


Subjective: No new changes (Patient plans to make a decision on coloectomy on 

Monday)





Review of Systems


10-point ROS is otherwise unremarkable





Physical Examination





- Vital Signs


Temperature: 98.5 F


Blood Pressure: 145/74


Pulse: 89


Respirations: 18


Pulse Ox (%): 95





- Physical Exam


General: Alert, In no apparent distress


HEENT: Atraumatic, PERRLA, EOMI


Neck: Supple, JVD not distended


Respiratory: Clear to auscultation bilaterally, Normal air movement


Cardiovascular: Regular rate/rhythm, Normal S1 S2


Gastrointestinal: Normal bowel sounds, No tenderness


Musculoskeletal: No tenderness


Integumentary: No rashes


Neurological: Normal speech, Normal tone, Normal affect


Lymphatics: No axilla or inguinal lymphadenopathy





Assessment And Plan





- Current Problems (Diagnosis)


(1) Pressure ulcer of unspecified site, unspecified stage


Current Visit: Yes   Status: Chronic   


Plan: 


Patient seen by Dr Gordon and Dr. Johnson.  No need for scrotal debridgment.  

Dr Johnson would like to do the sacral wound.  However would like to do a 

colectomy.  this is in the interest of letting the wound heal without chronic 

fecal contamination.   Redd has a long history of difficulty with his hygiene 

in this regard  


Qualifiers: 


   Pressure injury location: ankle   Pressure injury stage: stage 2   

Laterality: unspecified laterality   Qualified Code(s): L89.502 - Pressure ulcer

of unspecified ankle, stage 2   





(2) Hyperosmolar non-ketotic state due to type 2 diabetes mellitus


Current Visit: No   Status: Acute   


Plan: 


due to non compliance with insulin.  I had a talk with him this morning.   He 

may need therapy.  He has a  long history of non compliance.  This can be 

frustrating to his providers.  More importantly this behavior is most likely 

shortening his life expectancy.  





His sugars this morning are in the 150's   Stop the insulin drip.   Start him on

sq insulin and start feeding him 








(3) Chronic suprapubic catheter


Current Visit: Yes   Status: Chronic   


Plan: 


Is leaking.  Will consult Dr. Gordon 








(4) Chronic pain disorder


Onset Date: 11/03/17   Current Visit: No   Status: Chronic   


Plan: 


restart his hydromorphine 








(5) Spina bifida


Onset Date: 11/03/17   Current Visit: No   Status: Chronic   


Plan: 


Wheelchair bound 


Qualifiers: 


   Spinal region: lumbar 





(6) Depression


Current Visit: Yes   Status: Chronic   


Plan: 


Patient has been having a long history of non compliance with his medication.  

Discussed going to a nursing facility to improve his quality of care.  He is 

very willing. The patient has been had a history of depression.  1 suicide 

attempt in the past.  He is not actively suicidal.  Will start him an 

antidepressant and look for a long term councillor for the patient.  I need 

these reminders that life has been very unfair to Redd.  He acts out at times 

due to this.


Qualifiers: 


   Depression Type: major depressive disorder   Major depression recurrence: 

recurrent   Active/Remission status: currently active   Psychotic features: 

without psychotic features 


Discharge Plan: Home


Plan to discharge in: 24 Hours





- Code Status/Comfort Care


Code Status Assessed: No


Physician Review: Patient Assessed, Agree with Above Assessment and Plan


Critical Care: No


Time Spent Managing PTS Care (In Minutes): 30

## 2023-05-07 LAB
ALBUMIN SERPL BCP-MCNC: 1.8 G/DL (ref 3.4–5)
ALP SERPL-CCNC: 128 U/L (ref 45–117)
ALT SERPL W P-5'-P-CCNC: 43 U/L (ref 16–61)
AST SERPL W P-5'-P-CCNC: 20 U/L (ref 15–37)
BUN BLD-MCNC: 14 MG/DL (ref 7–18)
GIANT PLATELETS BLD QL SMEAR: (no result)
GLUCOSE SERPLBLD-MCNC: 183 MG/DL (ref 74–106)
HCT VFR BLD CALC: 39.6 % (ref 39.6–49)
LYMPHOCYTES # SPEC AUTO: 2 K/UL (ref 0.7–4.9)
MCV RBC: 89.8 FL (ref 80–100)
MORPHOLOGY BLD-IMP: (no result)
PMV BLD: 7.8 FL (ref 7.6–11.3)
POTASSIUM SERPL-SCNC: 3.7 MEQ/L (ref 3.5–5.1)
RBC # BLD: 4.41 M/UL (ref 4.33–5.43)

## 2023-05-07 RX ADMIN — HUMAN INSULIN SCH: 100 INJECTION, SOLUTION SUBCUTANEOUS at 07:30

## 2023-05-07 RX ADMIN — SODIUM CHLORIDE SCH MLS: 9 INJECTION, SOLUTION INTRAVENOUS at 16:42

## 2023-05-07 RX ADMIN — PROMETHAZINE HYDROCHLORIDE PRN MG: 25 INJECTION INTRAMUSCULAR; INTRAVENOUS at 16:42

## 2023-05-07 RX ADMIN — HUMAN INSULIN SCH UNIT: 100 INJECTION, SOLUTION SUBCUTANEOUS at 20:35

## 2023-05-07 RX ADMIN — Medication SCH APPL: at 08:30

## 2023-05-07 RX ADMIN — COLLAGENASE SANTYL SCH APPL: 250 OINTMENT TOPICAL at 08:31

## 2023-05-07 RX ADMIN — SODIUM CHLORIDE SCH MLS: 9 INJECTION, SOLUTION INTRAVENOUS at 00:28

## 2023-05-07 RX ADMIN — SODIUM CHLORIDE SCH: 0.9 INJECTION, SOLUTION INTRAVENOUS at 17:45

## 2023-05-07 RX ADMIN — HYDROMORPHONE HYDROCHLORIDE PRN MG: 2 INJECTION INTRAMUSCULAR; INTRAVENOUS; SUBCUTANEOUS at 10:55

## 2023-05-07 RX ADMIN — HUMAN INSULIN SCH: 100 INJECTION, SOLUTION SUBCUTANEOUS at 11:30

## 2023-05-07 RX ADMIN — Medication SCH: at 21:00

## 2023-05-07 RX ADMIN — Medication SCH: at 08:30

## 2023-05-07 RX ADMIN — MUPIROCIN SCH APPL: 20 OINTMENT TOPICAL at 08:30

## 2023-05-07 RX ADMIN — HYDROMORPHONE HYDROCHLORIDE PRN MG: 2 INJECTION INTRAMUSCULAR; INTRAVENOUS; SUBCUTANEOUS at 22:12

## 2023-05-07 RX ADMIN — HUMAN INSULIN SCH UNIT: 100 INJECTION, SOLUTION SUBCUTANEOUS at 16:42

## 2023-05-07 RX ADMIN — HYDROMORPHONE HYDROCHLORIDE PRN MG: 2 INJECTION INTRAMUSCULAR; INTRAVENOUS; SUBCUTANEOUS at 16:42

## 2023-05-07 RX ADMIN — PROMETHAZINE HYDROCHLORIDE PRN MG: 25 INJECTION INTRAMUSCULAR; INTRAVENOUS at 10:55

## 2023-05-07 RX ADMIN — SODIUM CHLORIDE SCH MLS: 0.9 INJECTION, SOLUTION INTRAVENOUS at 08:29

## 2023-05-07 RX ADMIN — SODIUM CHLORIDE SCH MLS: 9 INJECTION, SOLUTION INTRAVENOUS at 08:29

## 2023-05-07 RX ADMIN — MUPIROCIN SCH APPL: 20 OINTMENT TOPICAL at 20:35

## 2023-05-07 RX ADMIN — PROMETHAZINE HYDROCHLORIDE PRN MG: 25 INJECTION INTRAMUSCULAR; INTRAVENOUS at 05:07

## 2023-05-07 RX ADMIN — PROMETHAZINE HYDROCHLORIDE PRN MG: 25 INJECTION INTRAMUSCULAR; INTRAVENOUS at 22:12

## 2023-05-07 RX ADMIN — SODIUM CHLORIDE SCH MLS: 0.9 INJECTION, SOLUTION INTRAVENOUS at 05:07

## 2023-05-07 RX ADMIN — HYDROMORPHONE HYDROCHLORIDE PRN MG: 2 INJECTION INTRAMUSCULAR; INTRAVENOUS; SUBCUTANEOUS at 05:06

## 2023-05-07 NOTE — P.PN
Subjective


Date of Service: 05/07/23


Primary Care Provider: [facility]


Chief Complaint: hyperglycemia 


Subjective: No new changes





Review of Systems


10-point ROS is otherwise unremarkable





Physical Examination





- Vital Signs


Temperature: 98.9 F


Blood Pressure: 117/74


Pulse: 99


Respirations: 18


Pulse Ox (%): 94





- Physical Exam


General: Alert, In no apparent distress


HEENT: Atraumatic, PERRLA, EOMI


Neck: Supple, JVD not distended


Respiratory: Clear to auscultation bilaterally, Normal air movement


Cardiovascular: Regular rate/rhythm, Normal S1 S2


Gastrointestinal: Normal bowel sounds, No tenderness


Musculoskeletal: No tenderness


Integumentary: No rashes


Neurological: Normal speech, Normal tone, Normal affect


Lymphatics: No axilla or inguinal lymphadenopathy





Assessment And Plan





- Current Problems (Diagnosis)


(1) Pressure ulcer of unspecified site, unspecified stage


Current Visit: Yes   Status: Chronic   


Plan: 


Patient seen by Dr Gordon and Dr. Johnson.  No need for scrotal debridgment.  

Dr Johnson would like to do the sacral wound.  However would like to do a 

colectomy.  this is in the interest of letting the wound heal without chronic 

fecal contamination.   Redd has a long history of difficulty with his hygiene 

in this regard  


Qualifiers: 


   Pressure injury location: ankle   Pressure injury stage: stage 2   

Laterality: unspecified laterality   Qualified Code(s): L89.502 - Pressure ulcer

of unspecified ankle, stage 2   





(2) Hyperosmolar non-ketotic state due to type 2 diabetes mellitus


Current Visit: No   Status: Acute   


Plan: 


due to non compliance with insulin.  I had a talk with him this morning.   He 

may need therapy.  He has a  long history of non compliance.  This can be 

frustrating to his providers.  More importantly this behavior is most likely 

shortening his life expectancy.  





His sugars this morning are in the 150's   Stop the insulin drip.   Start him on

sq insulin and start feeding him 








(3) Chronic suprapubic catheter


Current Visit: Yes   Status: Chronic   


Plan: 


Is leaking.  Will consult Dr. Gordon 








(4) Chronic pain disorder


Onset Date: 11/03/17   Current Visit: No   Status: Chronic   


Plan: 


restart his hydromorphine 








(5) Spina bifida


Onset Date: 11/03/17   Current Visit: No   Status: Chronic   


Plan: 


Wheelchair bound 


Qualifiers: 


   Spinal region: lumbar 





(6) Depression


Current Visit: Yes   Status: Chronic   


Plan: 


Patient has been having a long history of non compliance with his medication.  

Discussed going to a nursing facility to improve his quality of care.  He is 

very willing. The patient has been had a history of depression.  1 suicide 

attempt in the past.  He is not actively suicidal.  Will start him an 

antidepressant and look for a long term councillor for the patient.  I need 

these reminders that life has been very unfair to Redd.  He acts out at times 

due to this.


Qualifiers: 


   Depression Type: major depressive disorder   Major depression recurrence: 

recurrent   Active/Remission status: currently active   Psychotic features: 

without psychotic features 


Discharge Plan: Home


Plan to discharge in: 24 Hours





- Code Status/Comfort Care


Code Status Assessed: No


Physician Review: Patient Assessed, Agree with Above Assessment and Plan


Critical Care: No


Time Spent Managing PTS Care (In Minutes): 20

## 2023-05-08 LAB
ALBUMIN SERPL BCP-MCNC: 1.7 G/DL (ref 3.4–5)
ALP SERPL-CCNC: 155 U/L (ref 45–117)
ALT SERPL W P-5'-P-CCNC: 44 U/L (ref 16–61)
AST SERPL W P-5'-P-CCNC: 25 U/L (ref 15–37)
BUN BLD-MCNC: 12 MG/DL (ref 7–18)
GLUCOSE SERPLBLD-MCNC: 211 MG/DL (ref 74–106)
HCT VFR BLD CALC: 35.8 % (ref 39.6–49)
LYMPHOCYTES # SPEC AUTO: 1.7 K/UL (ref 0.7–4.9)
MCV RBC: 88.6 FL (ref 80–100)
PMV BLD: 7.3 FL (ref 7.6–11.3)
POTASSIUM SERPL-SCNC: 3.7 MEQ/L (ref 3.5–5.1)
RBC # BLD: 4.04 M/UL (ref 4.33–5.43)

## 2023-05-08 RX ADMIN — SODIUM CHLORIDE SCH MLS: 9 INJECTION, SOLUTION INTRAVENOUS at 08:22

## 2023-05-08 RX ADMIN — INSULIN GLARGINE SCH UNIT: 100 INJECTION, SOLUTION SUBCUTANEOUS at 08:23

## 2023-05-08 RX ADMIN — Medication SCH APPL: at 08:25

## 2023-05-08 RX ADMIN — COLLAGENASE SANTYL SCH APPL: 250 OINTMENT TOPICAL at 08:25

## 2023-05-08 RX ADMIN — Medication SCH PKT: at 20:14

## 2023-05-08 RX ADMIN — Medication SCH PKT: at 08:22

## 2023-05-08 RX ADMIN — HYDROMORPHONE HYDROCHLORIDE PRN MG: 2 INJECTION INTRAMUSCULAR; INTRAVENOUS; SUBCUTANEOUS at 03:51

## 2023-05-08 RX ADMIN — HUMAN INSULIN SCH UNIT: 100 INJECTION, SOLUTION SUBCUTANEOUS at 08:23

## 2023-05-08 RX ADMIN — HYDROMORPHONE HYDROCHLORIDE PRN MG: 2 INJECTION INTRAMUSCULAR; INTRAVENOUS; SUBCUTANEOUS at 17:06

## 2023-05-08 RX ADMIN — SODIUM CHLORIDE SCH: 0.9 INJECTION, SOLUTION INTRAVENOUS at 03:45

## 2023-05-08 RX ADMIN — SODIUM CHLORIDE SCH: 0.9 INJECTION, SOLUTION INTRAVENOUS at 23:45

## 2023-05-08 RX ADMIN — HYDROMORPHONE HYDROCHLORIDE PRN MG: 2 INJECTION INTRAMUSCULAR; INTRAVENOUS; SUBCUTANEOUS at 10:44

## 2023-05-08 RX ADMIN — HUMAN INSULIN SCH UNIT: 100 INJECTION, SOLUTION SUBCUTANEOUS at 20:11

## 2023-05-08 RX ADMIN — PROMETHAZINE HYDROCHLORIDE PRN MG: 25 INJECTION INTRAMUSCULAR; INTRAVENOUS at 03:51

## 2023-05-08 RX ADMIN — HYDROMORPHONE HYDROCHLORIDE PRN MG: 2 INJECTION INTRAMUSCULAR; INTRAVENOUS; SUBCUTANEOUS at 23:10

## 2023-05-08 RX ADMIN — HUMAN INSULIN SCH: 100 INJECTION, SOLUTION SUBCUTANEOUS at 11:30

## 2023-05-08 RX ADMIN — PROMETHAZINE HYDROCHLORIDE PRN MG: 25 INJECTION INTRAMUSCULAR; INTRAVENOUS at 23:10

## 2023-05-08 RX ADMIN — MUPIROCIN SCH APPL: 20 OINTMENT TOPICAL at 08:24

## 2023-05-08 RX ADMIN — INSULIN GLARGINE SCH UNIT: 100 INJECTION, SOLUTION SUBCUTANEOUS at 20:11

## 2023-05-08 RX ADMIN — PROMETHAZINE HYDROCHLORIDE PRN MG: 25 INJECTION INTRAMUSCULAR; INTRAVENOUS at 10:44

## 2023-05-08 RX ADMIN — MUPIROCIN SCH APPL: 20 OINTMENT TOPICAL at 20:12

## 2023-05-08 RX ADMIN — HUMAN INSULIN SCH UNIT: 100 INJECTION, SOLUTION SUBCUTANEOUS at 17:05

## 2023-05-08 RX ADMIN — SODIUM CHLORIDE SCH MLS: 9 INJECTION, SOLUTION INTRAVENOUS at 01:13

## 2023-05-08 RX ADMIN — SODIUM CHLORIDE SCH MLS: 9 INJECTION, SOLUTION INTRAVENOUS at 16:05

## 2023-05-08 RX ADMIN — SODIUM CHLORIDE SCH MLS: 0.9 INJECTION, SOLUTION INTRAVENOUS at 08:21

## 2023-05-08 NOTE — P.PN
Subjective


Date of Service: 05/08/23


Primary Care Provider: [facility]


Chief Complaint: hyperglycemia 


Subjective: No new changes (patient being cleaned after a bowel movement.  He 

agrees for the reasoning for a colectomy)





Review of Systems


10-point ROS is otherwise unremarkable





Physical Examination





- Vital Signs


Temperature: 98.0 F


Blood Pressure: 110/59


Pulse: 98


Respirations: 18


Pulse Ox (%): 96





- Physical Exam


General: Alert, In no apparent distress


HEENT: Atraumatic, PERRLA, EOMI


Neck: Supple, JVD not distended


Respiratory: Clear to auscultation bilaterally, Normal air movement


Cardiovascular: Regular rate/rhythm, Normal S1 S2


Gastrointestinal: Normal bowel sounds, No tenderness


Musculoskeletal: No tenderness


Integumentary: No rashes


Neurological: Normal speech, Normal tone, Normal affect


Lymphatics: No axilla or inguinal lymphadenopathy





Assessment And Plan





- Current Problems (Diagnosis)


(1) Pressure ulcer of unspecified site, unspecified stage


Current Visit: Yes   Status: Chronic   


Plan: 


Patient seen by Dr Gordon and Dr. Johnson.  No need for scrotal debridgment.  CLOTILDE Johnson would like to do the sacral wound.  However would like to do a 

colectomy.  this is in the interest of letting the wound heal without chronic 

fecal contamination.   Redd has a long history of difficulty with his hygiene 

in this regard  


5.8


patient seems amenable to colectomy. Will discuss this with Dr. Johnson 


Qualifiers: 


   Pressure injury location: ankle   Pressure injury stage: stage 2   

Laterality: unspecified laterality   Qualified Code(s): L89.502 - Pressure ulcer

of unspecified ankle, stage 2   





(2) Hyperosmolar non-ketotic state due to type 2 diabetes mellitus


Current Visit: No   Status: Acute   


Plan: 


due to non compliance with insulin.  I had a talk with him this morning.   He 

may need therapy.  He has a  long history of non compliance.  This can be 

frustrating to his providers.  More importantly this behavior is most likely 

shortening his life expectancy.  





His sugars this morning are in the 150's   Stop the insulin drip.   Start him on

sq insulin and start feeding him 








(3) Chronic suprapubic catheter


Current Visit: Yes   Status: Chronic   


Plan: 


Is leaking.  Will consult Dr. Gordon 








(4) Chronic pain disorder


Onset Date: 11/03/17   Current Visit: No   Status: Chronic   


Plan: 


restart his hydromorphine 








(5) Spina bifida


Onset Date: 11/03/17   Current Visit: No   Status: Chronic   


Plan: 


Wheelchair bound 


Qualifiers: 


   Spinal region: lumbar 





(6) Depression


Current Visit: Yes   Status: Chronic   


Plan: 


Patient has been having a long history of non compliance with his medication.  

Discussed going to a nursing facility to improve his quality of care.  He is 

very willing. The patient has been had a history of depression.  1 suicide 

attempt in the past.  He is not actively suicidal.  Will start him an 

antidepressant and look for a long term councillor for the patient.  I need 

these reminders that life has been very unfair to Redd.  He acts out at times 

due to this.


Qualifiers: 


   Depression Type: major depressive disorder   Major depression recurrence: 

recurrent   Active/Remission status: currently active   Psychotic features: 

without psychotic features 


Discharge Plan: Nursing Home


Plan to discharge in: Greater than 2 days





- Code Status/Comfort Care


Code Status Assessed: No


Physician Review: Patient Assessed, Agree with Above Assessment and Plan


Critical Care: No


Time Spent Managing PTS Care (In Minutes): 20

## 2023-05-09 PROCEDURE — 02HV33Z INSERTION OF INFUSION DEVICE INTO SUPERIOR VENA CAVA, PERCUTANEOUS APPROACH: ICD-10-PCS

## 2023-05-09 RX ADMIN — HUMAN INSULIN SCH UNIT: 100 INJECTION, SOLUTION SUBCUTANEOUS at 20:26

## 2023-05-09 RX ADMIN — MUPIROCIN SCH APPL: 20 OINTMENT TOPICAL at 21:00

## 2023-05-09 RX ADMIN — HUMAN INSULIN SCH UNIT: 100 INJECTION, SOLUTION SUBCUTANEOUS at 16:38

## 2023-05-09 RX ADMIN — SODIUM CHLORIDE SCH: 0.9 INJECTION, SOLUTION INTRAVENOUS at 09:45

## 2023-05-09 RX ADMIN — Medication SCH APPL: at 09:52

## 2023-05-09 RX ADMIN — INSULIN GLARGINE SCH UNIT: 100 INJECTION, SOLUTION SUBCUTANEOUS at 10:30

## 2023-05-09 RX ADMIN — SODIUM CHLORIDE SCH MLS: 9 INJECTION, SOLUTION INTRAVENOUS at 20:24

## 2023-05-09 RX ADMIN — INSULIN GLARGINE SCH UNIT: 100 INJECTION, SOLUTION SUBCUTANEOUS at 20:27

## 2023-05-09 RX ADMIN — Medication SCH PKT: at 20:33

## 2023-05-09 RX ADMIN — PROMETHAZINE HYDROCHLORIDE PRN MG: 25 INJECTION INTRAMUSCULAR; INTRAVENOUS at 13:09

## 2023-05-09 RX ADMIN — Medication SCH PKT: at 09:00

## 2023-05-09 RX ADMIN — HYDROMORPHONE HYDROCHLORIDE PRN MG: 2 INJECTION INTRAMUSCULAR; INTRAVENOUS; SUBCUTANEOUS at 18:38

## 2023-05-09 RX ADMIN — SODIUM CHLORIDE SCH MLS: 9 INJECTION, SOLUTION INTRAVENOUS at 00:50

## 2023-05-09 RX ADMIN — SODIUM CHLORIDE SCH: 9 INJECTION, SOLUTION INTRAVENOUS at 17:00

## 2023-05-09 RX ADMIN — COLLAGENASE SANTYL SCH APPL: 250 OINTMENT TOPICAL at 09:52

## 2023-05-09 RX ADMIN — SODIUM CHLORIDE SCH MLS: 9 INJECTION, SOLUTION INTRAVENOUS at 13:45

## 2023-05-09 RX ADMIN — HYDROMORPHONE HYDROCHLORIDE PRN MG: 2 INJECTION INTRAMUSCULAR; INTRAVENOUS; SUBCUTANEOUS at 13:09

## 2023-05-09 RX ADMIN — PROMETHAZINE HYDROCHLORIDE PRN MG: 25 INJECTION INTRAMUSCULAR; INTRAVENOUS at 18:38

## 2023-05-09 RX ADMIN — MUPIROCIN SCH APPL: 20 OINTMENT TOPICAL at 09:51

## 2023-05-09 RX ADMIN — SODIUM CHLORIDE SCH MLS: 0.9 INJECTION, SOLUTION INTRAVENOUS at 16:38

## 2023-05-09 RX ADMIN — HUMAN INSULIN SCH: 100 INJECTION, SOLUTION SUBCUTANEOUS at 07:30

## 2023-05-09 RX ADMIN — HUMAN INSULIN SCH: 100 INJECTION, SOLUTION SUBCUTANEOUS at 11:30

## 2023-05-09 RX ADMIN — MUPIROCIN SCH APPL: 20 OINTMENT TOPICAL at 09:00

## 2023-05-09 NOTE — RAD REPORT
EXAM DESCRIPTION:  [facility] Single View5/9/2023 12:22 pm

 

CLINICAL HISTORY:  Device placement PICC line placement

 

FINDINGS:  A PICC line has been inserted with its tip in the SVC

## 2023-05-09 NOTE — P.PN
Subjective


Date of Service: 05/09/23


Primary Care Provider: [facility]


Chief Complaint: hyperglycemia 


Subjective: No new changes (Patient agreed to a coloectomy for me)





Review of Systems


10-point ROS is otherwise unremarkable





Physical Examination





- Vital Signs


Temperature: 97.9 F


Blood Pressure: 141/58


Pulse: 103


Respirations: 18


Pulse Ox (%): 97





- Physical Exam


General: Alert, In no apparent distress


HEENT: Atraumatic, PERRLA, EOMI


Neck: Supple, JVD not distended


Respiratory: Clear to auscultation bilaterally, Normal air movement


Cardiovascular: Regular rate/rhythm, Normal S1 S2


Gastrointestinal: Normal bowel sounds, No tenderness


Musculoskeletal: No tenderness


Integumentary: No rashes


Neurological: Normal speech, Normal tone, Normal affect


Lymphatics: No axilla or inguinal lymphadenopathy





Assessment And Plan





- Current Problems (Diagnosis)


(1) Pressure ulcer of unspecified site, unspecified stage


Current Visit: Yes   Status: Chronic   


Plan: 


Patient seen by Dr Gordon and Dr. Johnson.  No need for scrotal debridgment.  

Dr Johnson would like to do the sacral wound.  However would like to do a 

colectomy.  this is in the interest of letting the wound heal without chronic 

fecal contamination.   Redd has a long history of difficulty with his hygiene 

in this regard  


5.8


patient seems amenable to colectomy. Will discuss this with Dr. Johnson 


Qualifiers: 


   Pressure injury location: ankle   Pressure injury stage: stage 2   

Laterality: unspecified laterality   Qualified Code(s): L89.502 - Pressure ulcer

of unspecified ankle, stage 2   





(2) Hyperosmolar non-ketotic state due to type 2 diabetes mellitus


Current Visit: No   Status: Acute   


Plan: 


due to non compliance with insulin.  I had a talk with him this morning.   He 

may need therapy.  He has a  long history of non compliance.  This can be 

frustrating to his providers.  More importantly this behavior is most likely s

hortening his life expectancy.  





His sugars this morning are in the 150's   Stop the insulin drip.   Start him on

sq insulin and start feeding him 








(3) Chronic suprapubic catheter


Current Visit: Yes   Status: Chronic   


Plan: 


Is leaking.  Will consult Dr. Gordon 








(4) Chronic pain disorder


Onset Date: 11/03/17   Current Visit: No   Status: Chronic   


Plan: 


restart his hydromorphine 








(5) Spina bifida


Onset Date: 11/03/17   Current Visit: No   Status: Chronic   


Plan: 


Wheelchair bound 


Qualifiers: 


   Spinal region: lumbar 





(6) Depression


Current Visit: Yes   Status: Chronic   


Plan: 


Patient has been having a long history of non compliance with his medication.  

Discussed going to a nursing facility to improve his quality of care.  He is 

very willing. The patient has been had a history of depression.  1 suicide 

attempt in the past.  He is not actively suicidal.  Will start him an 

antidepressant and look for a long term councillor for the patient.  I need 

these reminders that life has been very unfair to Redd.  He acts out at times 

due to this.


Qualifiers: 


   Depression Type: major depressive disorder   Major depression recurrence: 

recurrent   Active/Remission status: currently active   Psychotic features: 

without psychotic features 


Discharge Plan: Nursing Home


Plan to discharge in: 48 Hours





- Code Status/Comfort Care


Code Status Assessed: No


Physician Review: Patient Assessed, Agree with Above Assessment and Plan


Critical Care: No


Time Spent Managing PTS Care (In Minutes): 20

## 2023-05-09 NOTE — P.PN
Date of Service: 05/09/23


37-year-old gentleman known to me from prior consult 1/26/2023, wheelchair-bound

with spina bifida, hydrocephalus, IDDM, GERD, and paraplegia with chronic 

indwelling suprapubic catheter managed at [facility] inconsistently, with 

bladder spasms/severe contraction with urethral incontinence and darshan-suprapubic

catheter incontinence causing scrotal and perineal skin maceration and necrosis,

now with urethral Ortega catheter and suprapubic tube within the bladder, 

maximally diverting his urine.





Examination:


Patient well-appearing and in no acute distress


Scrotum without any sign of crepitus.  Persistent scrotal erythema, but 

generalized improvement in the appearance of the scrotal skin.  On the inferior 

scrotum in the midline, there is a persistent area approximately 4 cm in 

diameter of skin desquamation with an approximately 1.5 to 2 cm area of 

superficial necrosis.





I reapplied a piece of Aquacel silver dressing covering the area of desquamated 

skin and necrosis.





A/Recs:


37-year-old gentleman known to me from prior consult 1/26/2023, wheelchair-bound

with spina bifida, hydrocephalus, IDDM, GERD, and paraplegia with chronic 

indwelling suprapubic catheter managed at [facility] inconsistently, with 

bladder spasms/severe contraction with urethral incontinence and darshan-suprapubic

catheter incontinence causing scrotal and perineal skin maceration and necrosis,

now with urethral Ortega catheter and suprapubic tube within the bladder, 

maximally diverting his urine, with improvement in the generalized scrotal 

tissues appearance but small area of persistent necrosis superficially.


-Recommend continued wound care with Aquacel silver applied to the area of 

inferior scrotal skin desquamation and necrosis, but I defer management to wound

care nursing.  It is my impression that the necrotic area of skin will likely 

slough and not require formal debridement.  That said, if Dr. Lujan is 

planning operative management, the necrotic skin can be formally debrided at 

that time hopefully.


-Continue maximal urine diversion, which is successful at keeping the scrotal 

perineal skin dry.


-Maximize anticholinergic therapy with Ditropan 5 mg p.o. every 8 hours


-consider tailoring antimicrobial therapy since urine cx sensitive to Ancef, 

which is effective for skin, and would be effective for the scrotal/perineal 

skin; recognizing it may be an adequate if infection of a decubitus ulcer is 

present

## 2023-05-10 RX ADMIN — MUPIROCIN SCH: 20 OINTMENT TOPICAL at 21:00

## 2023-05-10 RX ADMIN — HUMAN INSULIN SCH UNIT: 100 INJECTION, SOLUTION SUBCUTANEOUS at 21:23

## 2023-05-10 RX ADMIN — PROMETHAZINE HYDROCHLORIDE PRN MG: 25 INJECTION INTRAMUSCULAR; INTRAVENOUS at 12:50

## 2023-05-10 RX ADMIN — HUMAN INSULIN SCH UNIT: 100 INJECTION, SOLUTION SUBCUTANEOUS at 16:30

## 2023-05-10 RX ADMIN — SODIUM CHLORIDE SCH MLS: 0.9 INJECTION, SOLUTION INTRAVENOUS at 05:44

## 2023-05-10 RX ADMIN — PROMETHAZINE HYDROCHLORIDE PRN MG: 25 INJECTION INTRAMUSCULAR; INTRAVENOUS at 00:33

## 2023-05-10 RX ADMIN — HYDROMORPHONE HYDROCHLORIDE PRN MG: 2 INJECTION INTRAMUSCULAR; INTRAVENOUS; SUBCUTANEOUS at 00:27

## 2023-05-10 RX ADMIN — PROMETHAZINE HYDROCHLORIDE PRN MG: 25 INJECTION INTRAMUSCULAR; INTRAVENOUS at 18:24

## 2023-05-10 RX ADMIN — MUPIROCIN SCH APPL: 20 OINTMENT TOPICAL at 09:00

## 2023-05-10 RX ADMIN — COLLAGENASE SANTYL SCH APPL: 250 OINTMENT TOPICAL at 08:58

## 2023-05-10 RX ADMIN — Medication SCH APPL: at 08:59

## 2023-05-10 RX ADMIN — SODIUM CHLORIDE SCH MLS: 0.9 INJECTION, SOLUTION INTRAVENOUS at 18:24

## 2023-05-10 RX ADMIN — SODIUM CHLORIDE SCH MLS: 9 INJECTION, SOLUTION INTRAVENOUS at 08:57

## 2023-05-10 RX ADMIN — HYDROMORPHONE HYDROCHLORIDE PRN MG: 2 INJECTION INTRAMUSCULAR; INTRAVENOUS; SUBCUTANEOUS at 12:50

## 2023-05-10 RX ADMIN — HUMAN INSULIN SCH UNIT: 100 INJECTION, SOLUTION SUBCUTANEOUS at 12:50

## 2023-05-10 RX ADMIN — SODIUM CHLORIDE SCH MLS: 9 INJECTION, SOLUTION INTRAVENOUS at 00:28

## 2023-05-10 RX ADMIN — Medication SCH: at 21:00

## 2023-05-10 RX ADMIN — PROMETHAZINE HYDROCHLORIDE PRN MG: 25 INJECTION INTRAMUSCULAR; INTRAVENOUS at 06:50

## 2023-05-10 RX ADMIN — HYDROMORPHONE HYDROCHLORIDE PRN MG: 2 INJECTION INTRAMUSCULAR; INTRAVENOUS; SUBCUTANEOUS at 06:50

## 2023-05-10 RX ADMIN — Medication SCH PKT: at 09:00

## 2023-05-10 RX ADMIN — HUMAN INSULIN SCH UNIT: 100 INJECTION, SOLUTION SUBCUTANEOUS at 09:01

## 2023-05-10 RX ADMIN — MUPIROCIN SCH APPL: 20 OINTMENT TOPICAL at 08:59

## 2023-05-10 NOTE — P.PN
Subjective


Date of Service: 05/10/23


Primary Care Provider: L[facility]


Chief Complaint: hyperglycemia 


Subjective: No new changes





Review of Systems


10-point ROS is otherwise unremarkable





Physical Examination





- Vital Signs


Temperature: 97.1 F


Blood Pressure: 119/67


Pulse: 107


Respirations: 18


Pulse Ox (%): 98





- Physical Exam


General: Alert, In no apparent distress


HEENT: Atraumatic, PERRLA, EOMI


Neck: Supple, JVD not distended


Respiratory: Clear to auscultation bilaterally, Normal air movement


Cardiovascular: Regular rate/rhythm, Normal S1 S2


Gastrointestinal: Normal bowel sounds, No tenderness


Musculoskeletal: No tenderness


Integumentary: No rashes


Neurological: Normal speech, Normal tone, Normal affect


Lymphatics: No axilla or inguinal lymphadenopathy





Assessment And Plan





- Current Problems (Diagnosis)


(1) Pressure ulcer of unspecified site, unspecified stage


Current Visit: Yes   Status: Chronic   


Plan: 


Patient seen by Dr Gordon and Dr. Johnson.  No need for scrotal debridgment.  

Dr Johnson would like to do the sacral wound.  However would like to do a 

colectomy.  this is in the interest of letting the wound heal without chronic 

fecal contamination.   Redd has a long history of difficulty with his hygiene 

in this regard  


5.10


surgery would like to wait on debridgment. Will see if we can get him to a 

nursing home.  Manage wound care and insulin.  Then schedule surgery from there


Qualifiers: 


   Pressure injury location: ankle   Pressure injury stage: stage 2   

Laterality: unspecified laterality   Qualified Code(s): L89.502 - Pressure ulcer

of unspecified ankle, stage 2   





(2) Hyperosmolar non-ketotic state due to type 2 diabetes mellitus


Current Visit: No   Status: Acute   


Plan: 


due to non compliance with insulin.  I had a talk with him this morning.   He 

may need therapy.  He has a  long history of non compliance.  This can be 

frustrating to his providers.  More importantly this behavior is most likely 

shortening his life expectancy.  





His sugars this morning are in the 150's   Stop the insulin drip.   Start him on

sq insulin and start feeding him 








(3) Chronic suprapubic catheter


Current Visit: Yes   Status: Chronic   


Plan: 


Is leaking.  Will consult Dr. Gordon 








(4) Chronic pain disorder


Onset Date: 11/03/17   Current Visit: No   Status: Chronic   


Plan: 


restart his hydromorphine 








(5) Spina bifida


Onset Date: 11/03/17   Current Visit: No   Status: Chronic   


Plan: 


Wheelchair bound 


Qualifiers: 


   Spinal region: lumbar 





(6) Depression


Current Visit: Yes   Status: Chronic   


Plan: 


Patient has been having a long history of non compliance with his medication.  

Discussed going to a nursing facility to improve his quality of care.  He is 

very willing. The patient has been had a history of depression.  1 suicide 

attempt in the past.  He is not actively suicidal.  Will start him an 

antidepressant and look for a long term councillor for the patient.  I need 

these reminders that life has been very unfair to Redd.  He acts out at times 

due to this.


Qualifiers: 


   Depression Type: major depressive disorder   Major depression recurrence: 

recurrent   Active/Remission status: currently active   Psychotic features: 

without psychotic features 


Discharge Plan: Home


Plan to discharge in: 24 Hours





- Code Status/Comfort Care


Code Status Assessed: No


Physician Review: Patient Assessed, Agree with Above Assessment and Plan


Critical Care: No


Time Spent Managing PTS Care (In Minutes): 20

## 2023-05-11 RX ADMIN — MUPIROCIN SCH APPL: 20 OINTMENT TOPICAL at 07:07

## 2023-05-11 RX ADMIN — MUPIROCIN SCH APPL: 20 OINTMENT TOPICAL at 09:00

## 2023-05-11 RX ADMIN — Medication SCH: at 21:00

## 2023-05-11 RX ADMIN — HUMAN INSULIN SCH UNIT: 100 INJECTION, SOLUTION SUBCUTANEOUS at 21:21

## 2023-05-11 RX ADMIN — SODIUM CHLORIDE SCH MLS: 0.9 INJECTION, SOLUTION INTRAVENOUS at 16:45

## 2023-05-11 RX ADMIN — HUMAN INSULIN SCH: 100 INJECTION, SOLUTION SUBCUTANEOUS at 11:30

## 2023-05-11 RX ADMIN — HUMAN INSULIN SCH UNIT: 100 INJECTION, SOLUTION SUBCUTANEOUS at 16:46

## 2023-05-11 RX ADMIN — MUPIROCIN SCH APPL: 20 OINTMENT TOPICAL at 09:11

## 2023-05-11 RX ADMIN — HUMAN INSULIN SCH UNIT: 100 INJECTION, SOLUTION SUBCUTANEOUS at 09:05

## 2023-05-11 RX ADMIN — INSULIN GLARGINE SCH UNIT: 100 INJECTION, SOLUTION SUBCUTANEOUS at 09:06

## 2023-05-11 RX ADMIN — MUPIROCIN SCH APPL: 20 OINTMENT TOPICAL at 21:20

## 2023-05-11 RX ADMIN — SODIUM CHLORIDE SCH MLS: 0.9 INJECTION, SOLUTION INTRAVENOUS at 05:22

## 2023-05-11 RX ADMIN — SODIUM CHLORIDE SCH MLS: 0.9 INJECTION, SOLUTION INTRAVENOUS at 21:23

## 2023-05-11 RX ADMIN — Medication SCH PKT: at 09:06

## 2023-05-11 RX ADMIN — MUPIROCIN SCH APPL: 20 OINTMENT TOPICAL at 21:22

## 2023-05-11 RX ADMIN — Medication SCH APPL: at 09:08

## 2023-05-11 NOTE — P.PN
Subjective


Date of Service: 05/11/23


Primary Care Provider: [facility]


Chief Complaint: hyperglycemia 


Subjective: No new changes





Review of Systems


pain 





Physical Examination





- Vital Signs


Temperature: 97.8 F


Blood Pressure: 101/58


Pulse: 102


Respirations: 18


Pulse Ox (%): 98





- Physical Exam


General: Alert, In no apparent distress


HEENT: Atraumatic, PERRLA, EOMI


Neck: Supple, JVD not distended


Respiratory: Clear to auscultation bilaterally, Normal air movement


Cardiovascular: Regular rate/rhythm, Normal S1 S2


Gastrointestinal: Normal bowel sounds, No tenderness


Musculoskeletal: No tenderness


Integumentary: No rashes


Neurological: Normal speech, Normal tone, Normal affect


Lymphatics: No axilla or inguinal lymphadenopathy





Assessment And Plan





- Current Problems (Diagnosis)


(1) Pressure ulcer of unspecified site, unspecified stage


Current Visit: Yes   Status: Chronic   


Plan: 


Patient seen by Dr Gordon and Dr. Johnson.  No need for scrotal debridgment.  

Dr Johnson would like to do the sacral wound.  However would like to do a 

colectomy.  this is in the interest of letting the wound heal without chronic 

fecal contamination.   Redd has a long history of difficulty with his hygiene 

in this regard  


5.11 


Patient not accepted at [facility].  state he has no electricity at 

home.  Will keep him in house and try different facilities


Qualifiers: 


   Pressure injury location: ankle   Pressure injury stage: stage 2   

Laterality: unspecified laterality   Qualified Code(s): L89.502 - Pressure ulcer

of unspecified ankle, stage 2   





(2) Hyperosmolar non-ketotic state due to type 2 diabetes mellitus


Current Visit: No   Status: Acute   


Plan: 


due to non compliance with insulin.  I had a talk with him this morning.   He 

may need therapy.  He has a  long history of non compliance.  This can be 

frustrating to his providers.  More importantly this behavior is most likely 

shortening his life expectancy.  





His sugars this morning are in the 150's   Stop the insulin drip.   Start him on

sq insulin and start feeding him 








(3) Chronic suprapubic catheter


Current Visit: Yes   Status: Chronic   


Plan: 


Is leaking.  Will consult Dr. Gordon 








(4) Chronic pain disorder


Onset Date: 11/03/17   Current Visit: No   Status: Chronic   


Plan: 


restart his hydromorphine 








(5) Spina bifida


Onset Date: 11/03/17   Current Visit: No   Status: Chronic   


Plan: 


Wheelchair bound 


Qualifiers: 


   Spinal region: lumbar 





(6) Depression


Current Visit: Yes   Status: Chronic   


Plan: 


Patient has been having a long history of non compliance with his medication.  

Discussed going to a nursing facility to improve his quality of care.  He is 

very willing. The patient has been had a history of depression.  1 suicide 

attempt in the past.  He is not actively suicidal.  Will start him an 

antidepressant and look for a long term councillor for the patient.  I need the

se reminders that life has been very unfair to Redd.  He acts out at times due

to this.


Qualifiers: 


   Depression Type: major depressive disorder   Major depression recurrence: 

recurrent   Active/Remission status: currently active   Psychotic features: 

without psychotic features 


Discharge Plan: Nursing Home


Plan to discharge in: Greater than 2 days





- Code Status/Comfort Care


Code Status Assessed: No


Physician Review: Patient Assessed, Agree with Above Assessment and Plan


Critical Care: No


Time Spent Managing PTS Care (In Minutes): 250

## 2023-05-12 PROCEDURE — 30233N1 TRANSFUSION OF NONAUTOLOGOUS RED BLOOD CELLS INTO PERIPHERAL VEIN, PERCUTANEOUS APPROACH: ICD-10-PCS

## 2023-05-12 RX ADMIN — Medication SCH APPL: at 09:02

## 2023-05-12 RX ADMIN — Medication SCH: at 09:00

## 2023-05-12 RX ADMIN — SODIUM CHLORIDE SCH MLS: 0.9 INJECTION, SOLUTION INTRAVENOUS at 07:45

## 2023-05-12 RX ADMIN — HUMAN INSULIN SCH: 100 INJECTION, SOLUTION SUBCUTANEOUS at 16:30

## 2023-05-12 RX ADMIN — PROMETHAZINE HYDROCHLORIDE PRN MG: 25 INJECTION INTRAMUSCULAR; INTRAVENOUS at 22:09

## 2023-05-12 RX ADMIN — PROMETHAZINE HYDROCHLORIDE PRN MG: 25 INJECTION INTRAMUSCULAR; INTRAVENOUS at 04:46

## 2023-05-12 RX ADMIN — HUMAN INSULIN SCH: 100 INJECTION, SOLUTION SUBCUTANEOUS at 11:30

## 2023-05-12 RX ADMIN — INSULIN GLARGINE SCH UNIT: 100 INJECTION, SOLUTION SUBCUTANEOUS at 09:16

## 2023-05-12 RX ADMIN — HYDROMORPHONE HYDROCHLORIDE PRN MG: 2 INJECTION INTRAMUSCULAR; INTRAVENOUS; SUBCUTANEOUS at 22:09

## 2023-05-12 RX ADMIN — SODIUM CHLORIDE SCH: 0.9 INJECTION, SOLUTION INTRAVENOUS at 17:45

## 2023-05-12 RX ADMIN — MUPIROCIN SCH APPL: 20 OINTMENT TOPICAL at 20:53

## 2023-05-12 RX ADMIN — COLLAGENASE SANTYL SCH APPL: 250 OINTMENT TOPICAL at 09:03

## 2023-05-12 RX ADMIN — MUPIROCIN SCH APPL: 20 OINTMENT TOPICAL at 09:00

## 2023-05-12 RX ADMIN — MUPIROCIN SCH APPL: 20 OINTMENT TOPICAL at 09:06

## 2023-05-12 RX ADMIN — Medication SCH PKT: at 20:53

## 2023-05-12 RX ADMIN — HUMAN INSULIN SCH: 100 INJECTION, SOLUTION SUBCUTANEOUS at 20:58

## 2023-05-12 RX ADMIN — HUMAN INSULIN SCH: 100 INJECTION, SOLUTION SUBCUTANEOUS at 07:30

## 2023-05-12 RX ADMIN — MUPIROCIN SCH APPL: 20 OINTMENT TOPICAL at 20:57

## 2023-05-12 NOTE — P.PN
Subjective


Date of Service: 05/12/23


Primary Care Provider: LIDA


Chief Complaint: hyperglycemia 


Subjective: No new changes (patient states he wont go to a nursing facility)





Review of Systems


10-point ROS is otherwise unremarkable





Physical Examination





- Vital Signs


Temperature: 97.6 F


Blood Pressure: 132/72


Pulse: 103


Respirations: 18


Pulse Ox (%): 95





- Physical Exam


General: Alert, In no apparent distress


HEENT: Atraumatic, PERRLA, EOMI


Neck: Supple, JVD not distended


Respiratory: Clear to auscultation bilaterally, Normal air movement


Cardiovascular: Regular rate/rhythm, Normal S1 S2


Gastrointestinal: Normal bowel sounds, No tenderness


Musculoskeletal: No tenderness


Integumentary: No rashes


Neurological: Normal speech, Normal tone, Normal affect


Lymphatics: No axilla or inguinal lymphadenopathy





Assessment And Plan





- Current Problems (Diagnosis)


(1) Pressure ulcer of unspecified site, unspecified stage


Current Visit: Yes   Status: Chronic   


Plan: 


Patient seen by Dr Gordon and Dr. Johnson.  No need for scrotal debridgment.  

Dr Johnson would like to do the sacral wound.  However would like to do a 

colectomy.  this is in the interest of letting the wound heal without chronic 

fecal contamination.   Redd has a long history of difficulty with his hygiene 

in this regard  


5.12 


will keep him till he gets a coloectomy.  Sending him home now.  He is likely 

not to take his insulin.  Sit in his filth and come back very septic.   


Qualifiers: 


   Pressure injury location: ankle   Pressure injury stage: stage 2   

Laterality: unspecified laterality   Qualified Code(s): L89.502 - Pressure ulcer

of unspecified ankle, stage 2   





(2) Hyperosmolar non-ketotic state due to type 2 diabetes mellitus


Current Visit: No   Status: Acute   


Plan: 


due to non compliance with insulin.  I had a talk with him this morning.   He 

may need therapy.  He has a  long history of non compliance.  This can be 

frustrating to his providers.  More importantly this behavior is most likely 

shortening his life expectancy.  





His sugars this morning are in the 150's   Stop the insulin drip.   Start him on

sq insulin and start feeding him 








(3) Chronic suprapubic catheter


Current Visit: Yes   Status: Chronic   


Plan: 


Is leaking.  Will consult Dr. Gordon 








(4) Chronic pain disorder


Onset Date: 11/03/17   Current Visit: No   Status: Chronic   


Plan: 


restart his hydromorphine 








(5) Spina bifida


Onset Date: 11/03/17   Current Visit: No   Status: Chronic   


Plan: 


Wheelchair bound 


Qualifiers: 


   Spinal region: lumbar 





(6) Depression


Current Visit: Yes   Status: Chronic   


Plan: 


Patient has been having a long history of non compliance with his medication.  

Discussed going to a nursing facility to improve his quality of care.  He is 

very willing. The patient has been had a history of depression.  1 suicide 

attempt in the past.  He is not actively suicidal.  Will start him an 

antidepressant and look for a long term councillor for the patient.  I need 

these reminders that life has been very unfair to Redd.  He acts out at times 

due to this.


Qualifiers: 


   Depression Type: major depressive disorder   Major depression recurrence: 

recurrent   Active/Remission status: currently active   Psychotic features: 

without psychotic features 


Physician Review: Patient Assessed, Agree with Above Assessment and Plan

## 2023-05-13 RX ADMIN — HYDROMORPHONE HYDROCHLORIDE PRN MG: 2 INJECTION INTRAMUSCULAR; INTRAVENOUS; SUBCUTANEOUS at 10:01

## 2023-05-13 RX ADMIN — SODIUM CHLORIDE SCH MLS: 0.9 INJECTION, SOLUTION INTRAVENOUS at 02:11

## 2023-05-13 RX ADMIN — PROMETHAZINE HYDROCHLORIDE PRN MG: 25 INJECTION INTRAMUSCULAR; INTRAVENOUS at 18:28

## 2023-05-13 RX ADMIN — INSULIN GLARGINE SCH UNIT: 100 INJECTION, SOLUTION SUBCUTANEOUS at 12:20

## 2023-05-13 RX ADMIN — MUPIROCIN SCH APPL: 20 OINTMENT TOPICAL at 09:00

## 2023-05-13 RX ADMIN — SODIUM CHLORIDE SCH MLS: 0.9 INJECTION, SOLUTION INTRAVENOUS at 09:42

## 2023-05-13 RX ADMIN — HUMAN INSULIN SCH: 100 INJECTION, SOLUTION SUBCUTANEOUS at 11:30

## 2023-05-13 RX ADMIN — HYDROMORPHONE HYDROCHLORIDE PRN MG: 2 INJECTION INTRAMUSCULAR; INTRAVENOUS; SUBCUTANEOUS at 14:05

## 2023-05-13 RX ADMIN — HUMAN INSULIN SCH UNIT: 100 INJECTION, SOLUTION SUBCUTANEOUS at 16:30

## 2023-05-13 RX ADMIN — SODIUM CHLORIDE SCH MLS: 0.9 INJECTION, SOLUTION INTRAVENOUS at 22:07

## 2023-05-13 RX ADMIN — HYDROMORPHONE HYDROCHLORIDE PRN MG: 2 INJECTION INTRAMUSCULAR; INTRAVENOUS; SUBCUTANEOUS at 18:28

## 2023-05-13 RX ADMIN — HUMAN INSULIN SCH: 100 INJECTION, SOLUTION SUBCUTANEOUS at 07:30

## 2023-05-13 RX ADMIN — HYDROMORPHONE HYDROCHLORIDE PRN MG: 2 INJECTION INTRAMUSCULAR; INTRAVENOUS; SUBCUTANEOUS at 02:11

## 2023-05-13 RX ADMIN — Medication SCH APPL: at 09:00

## 2023-05-13 RX ADMIN — Medication SCH: at 09:00

## 2023-05-13 RX ADMIN — HUMAN INSULIN SCH UNIT: 100 INJECTION, SOLUTION SUBCUTANEOUS at 21:39

## 2023-05-13 RX ADMIN — Medication SCH: at 21:00

## 2023-05-13 RX ADMIN — PROMETHAZINE HYDROCHLORIDE PRN MG: 25 INJECTION INTRAMUSCULAR; INTRAVENOUS at 06:10

## 2023-05-13 RX ADMIN — HYDROMORPHONE HYDROCHLORIDE PRN MG: 2 INJECTION INTRAMUSCULAR; INTRAVENOUS; SUBCUTANEOUS at 06:09

## 2023-05-13 RX ADMIN — SODIUM CHLORIDE SCH: 0.9 INJECTION, SOLUTION INTRAVENOUS at 23:45

## 2023-05-13 RX ADMIN — MUPIROCIN SCH APPL: 20 OINTMENT TOPICAL at 21:40

## 2023-05-13 RX ADMIN — COLLAGENASE SANTYL SCH APPL: 250 OINTMENT TOPICAL at 09:00

## 2023-05-13 RX ADMIN — HYDROMORPHONE HYDROCHLORIDE PRN MG: 2 INJECTION INTRAMUSCULAR; INTRAVENOUS; SUBCUTANEOUS at 22:07

## 2023-05-13 RX ADMIN — MUPIROCIN SCH APPL: 20 OINTMENT TOPICAL at 21:38

## 2023-05-13 NOTE — P.PN
Subjective


Date of Service: 05/13/23


Primary Care Provider: LIDA


Chief Complaint: hyperglycemia 


Subjective: No new changes





Review of Systems


10-point ROS is otherwise unremarkable





Physical Examination





- Vital Signs


Temperature: 98.7 F


Blood Pressure: 112/69


Pulse: 105


Respirations: 20


Pulse Ox (%): 95





- Physical Exam


General: Alert, In no apparent distress


HEENT: Atraumatic, PERRLA, EOMI


Neck: Supple, JVD not distended


Respiratory: Clear to auscultation bilaterally, Normal air movement


Cardiovascular: Regular rate/rhythm, Normal S1 S2


Gastrointestinal: Normal bowel sounds, No tenderness


Musculoskeletal: No tenderness


Integumentary: No rashes


Neurological: Normal speech, Normal tone, Normal affect


Lymphatics: No axilla or inguinal lymphadenopathy





Assessment And Plan





- Current Problems (Diagnosis)


(1) Pressure ulcer of unspecified site, unspecified stage


Current Visit: Yes   Status: Chronic   


Plan: 


Patient seen by Dr Gordon and Dr. Johnson.  No need for scrotal debridgment.  

Dr Johnson would like to do the sacral wound.  However would like to do a 

colectomy.  this is in the interest of letting the wound heal without chronic 

fecal contamination.   Redd has a long history of difficulty with his hygiene 

in this regard  


5.12 


will keep him till he gets a coloectomy.  Sending him home now.  He is likely 

not to take his insulin.  Sit in his filth and come back very septic.   


Qualifiers: 


   Pressure injury location: ankle   Pressure injury stage: stage 2   

Laterality: unspecified laterality   Qualified Code(s): L89.502 - Pressure ulcer

of unspecified ankle, stage 2   





(2) Hyperosmolar non-ketotic state due to type 2 diabetes mellitus


Current Visit: No   Status: Acute   


Plan: 


due to non compliance with insulin.  I had a talk with him this morning.   He 

may need therapy.  He has a  long history of non compliance.  This can be 

frustrating to his providers.  More importantly this behavior is most likely 

shortening his life expectancy.  





His sugars this morning are in the 150's   Stop the insulin drip.   Start him on

sq insulin and start feeding him 








(3) Chronic suprapubic catheter


Current Visit: Yes   Status: Chronic   


Plan: 


Is leaking.  Will consult Dr. Gordon 








(4) Chronic pain disorder


Onset Date: 11/03/17   Current Visit: No   Status: Chronic   


Plan: 


restart his hydromorphine 








(5) Spina bifida


Onset Date: 11/03/17   Current Visit: No   Status: Chronic   


Plan: 


Wheelchair bound 


Qualifiers: 


   Spinal region: lumbar 





(6) Depression


Current Visit: Yes   Status: Chronic   


Plan: 


Patient has been having a long history of non compliance with his medication.  

Discussed going to a nursing facility to improve his quality of care.  He is 

very willing. The patient has been had a history of depression.  1 suicide 

attempt in the past.  He is not actively suicidal.  Will start him an 

antidepressant and look for a long term councillor for the patient.  I need 

these reminders that life has been very unfair to Redd.  He acts out at times 

due to this.


Qualifiers: 


   Depression Type: major depressive disorder   Major depression recurrence: 

recurrent   Active/Remission status: currently active   Psychotic features: 

without psychotic features 


Discharge Plan: Home


Plan to discharge in: 24 Hours





- Code Status/Comfort Care


Code Status Assessed: No


Physician Review: Patient Assessed, Agree with Above Assessment and Plan


Critical Care: No


Time Spent Managing PTS Care (In Minutes): 20

## 2023-05-14 RX ADMIN — COLLAGENASE SANTYL SCH APPL: 250 OINTMENT TOPICAL at 08:44

## 2023-05-14 RX ADMIN — INSULIN GLARGINE SCH UNIT: 100 INJECTION, SOLUTION SUBCUTANEOUS at 08:39

## 2023-05-14 RX ADMIN — PROMETHAZINE HYDROCHLORIDE PRN MG: 25 INJECTION INTRAMUSCULAR; INTRAVENOUS at 11:24

## 2023-05-14 RX ADMIN — MUPIROCIN SCH APPL: 20 OINTMENT TOPICAL at 08:44

## 2023-05-14 RX ADMIN — Medication SCH APPL: at 08:43

## 2023-05-14 RX ADMIN — HUMAN INSULIN SCH UNIT: 100 INJECTION, SOLUTION SUBCUTANEOUS at 08:41

## 2023-05-14 RX ADMIN — HUMAN INSULIN SCH: 100 INJECTION, SOLUTION SUBCUTANEOUS at 15:52

## 2023-05-14 RX ADMIN — HYDROMORPHONE HYDROCHLORIDE PRN MG: 2 INJECTION INTRAMUSCULAR; INTRAVENOUS; SUBCUTANEOUS at 16:06

## 2023-05-14 RX ADMIN — SODIUM CHLORIDE SCH MLS: 0.9 INJECTION, SOLUTION INTRAVENOUS at 08:39

## 2023-05-14 RX ADMIN — HUMAN INSULIN SCH UNIT: 100 INJECTION, SOLUTION SUBCUTANEOUS at 11:59

## 2023-05-14 RX ADMIN — MUPIROCIN SCH APPL: 20 OINTMENT TOPICAL at 21:00

## 2023-05-14 RX ADMIN — PROMETHAZINE HYDROCHLORIDE PRN MG: 25 INJECTION INTRAMUSCULAR; INTRAVENOUS at 03:28

## 2023-05-14 RX ADMIN — Medication SCH: at 08:45

## 2023-05-14 RX ADMIN — HYDROMORPHONE HYDROCHLORIDE PRN MG: 2 INJECTION INTRAMUSCULAR; INTRAVENOUS; SUBCUTANEOUS at 20:05

## 2023-05-14 RX ADMIN — HYDROMORPHONE HYDROCHLORIDE PRN MG: 2 INJECTION INTRAMUSCULAR; INTRAVENOUS; SUBCUTANEOUS at 07:09

## 2023-05-14 RX ADMIN — HYDROMORPHONE HYDROCHLORIDE PRN MG: 2 INJECTION INTRAMUSCULAR; INTRAVENOUS; SUBCUTANEOUS at 03:28

## 2023-05-14 RX ADMIN — PROMETHAZINE HYDROCHLORIDE PRN MG: 25 INJECTION INTRAMUSCULAR; INTRAVENOUS at 21:28

## 2023-05-14 RX ADMIN — Medication SCH PKT: at 21:00

## 2023-05-14 RX ADMIN — HUMAN INSULIN SCH: 100 INJECTION, SOLUTION SUBCUTANEOUS at 21:00

## 2023-05-14 RX ADMIN — HYDROMORPHONE HYDROCHLORIDE PRN MG: 2 INJECTION INTRAMUSCULAR; INTRAVENOUS; SUBCUTANEOUS at 11:24

## 2023-05-14 NOTE — P.PN
Subjective


Date of Service: 05/14/23


Primary Care Provider: LIDA


Chief Complaint: hyperglycemia 


Subjective: No new changes





Review of Systems


10-point ROS is otherwise unremarkable





Physical Examination





- Vital Signs


Temperature: 98.1 F


Blood Pressure: 111/64


Pulse: 103


Respirations: 14


Pulse Ox (%): 95





- Physical Exam


General: Alert, In no apparent distress


HEENT: Atraumatic, PERRLA, EOMI


Neck: Supple, JVD not distended


Respiratory: Clear to auscultation bilaterally, Normal air movement


Cardiovascular: Regular rate/rhythm, Normal S1 S2


Gastrointestinal: Normal bowel sounds, No tenderness


Musculoskeletal: No tenderness


Integumentary: No rashes


Neurological: Normal speech, Normal tone, Normal affect


Lymphatics: No axilla or inguinal lymphadenopathy





Assessment And Plan





- Current Problems (Diagnosis)


(1) Pressure ulcer of unspecified site, unspecified stage


Current Visit: Yes   Status: Chronic   


Plan: 


Patient seen by Dr Gordon and Dr. Johnson.  No need for scrotal debridgment.  

Dr Johnson would like to do the sacral wound.  However would like to do a 

colectomy.  this is in the interest of letting the wound heal without chronic 

fecal contamination.   Redd has a long history of difficulty with his hygiene 

in this regard  


5.12 


will keep him till he gets a coloectomy.  Sending him home now.  He is likely 

not to take his insulin.  Sit in his filth and come back very septic.   


Qualifiers: 


   Pressure injury location: ankle   Pressure injury stage: stage 2   

Laterality: unspecified laterality   Qualified Code(s): L89.502 - Pressure ulcer

of unspecified ankle, stage 2   





(2) Hyperosmolar non-ketotic state due to type 2 diabetes mellitus


Current Visit: No   Status: Acute   


Plan: 


due to non compliance with insulin.  I had a talk with him this morning.   He 

may need therapy.  He has a  long history of non compliance.  This can be 

frustrating to his providers.  More importantly this behavior is most likely 

shortening his life expectancy.  





His sugars this morning are in the 150's   Stop the insulin drip.   Start him on

sq insulin and start feeding him 








(3) Chronic suprapubic catheter


Current Visit: Yes   Status: Chronic   


Plan: 


Is leaking.  Will consult Dr. Gordon 








(4) Chronic pain disorder


Onset Date: 11/03/17   Current Visit: No   Status: Chronic   


Plan: 


restart his hydromorphine 








(5) Spina bifida


Onset Date: 11/03/17   Current Visit: No   Status: Chronic   


Plan: 


Wheelchair bound 


Qualifiers: 


   Spinal region: lumbar 





(6) Depression


Current Visit: Yes   Status: Chronic   


Plan: 


Patient has been having a long history of non compliance with his medication.  

Discussed going to a nursing facility to improve his quality of care.  He is 

very willing. The patient has been had a history of depression.  1 suicide 

attempt in the past.  He is not actively suicidal.  Will start him an 

antidepressant and look for a long term councillor for the patient.  I need 

these reminders that life has been very unfair to Redd.  He acts out at times 

due to this.


Qualifiers: 


   Depression Type: major depressive disorder   Major depression recurrence: 

recurrent   Active/Remission status: currently active   Psychotic features: 

without psychotic features 


Discharge Plan: Home


Plan to discharge in: 24 Hours





- Code Status/Comfort Care


Code Status Assessed: No


Physician Review: Patient Assessed, Agree with Above Assessment and Plan


Critical Care: No


Time Spent Managing PTS Care (In Minutes): 20

## 2023-05-15 RX ADMIN — HUMAN INSULIN SCH: 100 INJECTION, SOLUTION SUBCUTANEOUS at 16:24

## 2023-05-15 RX ADMIN — HYDROMORPHONE HYDROCHLORIDE PRN MG: 2 INJECTION INTRAMUSCULAR; INTRAVENOUS; SUBCUTANEOUS at 05:23

## 2023-05-15 RX ADMIN — PROMETHAZINE HYDROCHLORIDE PRN MG: 25 INJECTION INTRAMUSCULAR; INTRAVENOUS at 14:45

## 2023-05-15 RX ADMIN — PROMETHAZINE HYDROCHLORIDE PRN MG: 25 INJECTION INTRAMUSCULAR; INTRAVENOUS at 22:51

## 2023-05-15 RX ADMIN — MUPIROCIN SCH APPL: 20 OINTMENT TOPICAL at 09:00

## 2023-05-15 RX ADMIN — Medication SCH: at 21:00

## 2023-05-15 RX ADMIN — SODIUM CHLORIDE SCH: 0.9 INJECTION, SOLUTION INTRAVENOUS at 05:45

## 2023-05-15 RX ADMIN — HUMAN INSULIN SCH UNIT: 100 INJECTION, SOLUTION SUBCUTANEOUS at 12:50

## 2023-05-15 RX ADMIN — HYDROMORPHONE HYDROCHLORIDE PRN MG: 2 INJECTION INTRAMUSCULAR; INTRAVENOUS; SUBCUTANEOUS at 14:45

## 2023-05-15 RX ADMIN — Medication SCH APPL: at 09:10

## 2023-05-15 RX ADMIN — MUPIROCIN SCH APPL: 20 OINTMENT TOPICAL at 21:00

## 2023-05-15 RX ADMIN — SODIUM CHLORIDE SCH MLS: 0.9 INJECTION, SOLUTION INTRAVENOUS at 14:45

## 2023-05-15 RX ADMIN — Medication SCH: at 09:00

## 2023-05-15 RX ADMIN — COLLAGENASE SANTYL SCH APPL: 250 OINTMENT TOPICAL at 09:00

## 2023-05-15 RX ADMIN — HUMAN INSULIN SCH UNIT: 100 INJECTION, SOLUTION SUBCUTANEOUS at 09:09

## 2023-05-15 RX ADMIN — SODIUM CHLORIDE SCH MLS: 0.9 INJECTION, SOLUTION INTRAVENOUS at 05:27

## 2023-05-15 RX ADMIN — HYDROMORPHONE HYDROCHLORIDE PRN MG: 2 INJECTION INTRAMUSCULAR; INTRAVENOUS; SUBCUTANEOUS at 22:49

## 2023-05-15 RX ADMIN — HUMAN INSULIN SCH UNIT: 100 INJECTION, SOLUTION SUBCUTANEOUS at 21:29

## 2023-05-15 RX ADMIN — PROMETHAZINE HYDROCHLORIDE PRN MG: 25 INJECTION INTRAMUSCULAR; INTRAVENOUS at 05:25

## 2023-05-15 RX ADMIN — INSULIN GLARGINE SCH UNIT: 100 INJECTION, SOLUTION SUBCUTANEOUS at 09:10

## 2023-05-15 RX ADMIN — HYDROMORPHONE HYDROCHLORIDE PRN MG: 2 INJECTION INTRAMUSCULAR; INTRAVENOUS; SUBCUTANEOUS at 00:18

## 2023-05-15 RX ADMIN — HYDROMORPHONE HYDROCHLORIDE PRN MG: 2 INJECTION INTRAMUSCULAR; INTRAVENOUS; SUBCUTANEOUS at 18:43

## 2023-05-15 RX ADMIN — HYDROMORPHONE HYDROCHLORIDE PRN MG: 2 INJECTION INTRAMUSCULAR; INTRAVENOUS; SUBCUTANEOUS at 10:40

## 2023-05-15 NOTE — P.PN
Subjective


Date of Service: 05/15/23


Primary Care Provider: LIDA


Chief Complaint: hyperglycemia 


Subjective: No new changes





Review of Systems


10-point ROS is otherwise unremarkable





Physical Examination





- Vital Signs


Temperature: 98.5 F


Blood Pressure: 144/76


Pulse: 99


Respirations: 17


Pulse Ox (%): 97





- Physical Exam


General: Alert, In no apparent distress


HEENT: Atraumatic, PERRLA, EOMI


Neck: Supple, JVD not distended


Respiratory: Clear to auscultation bilaterally, Normal air movement


Cardiovascular: Regular rate/rhythm, Normal S1 S2


Gastrointestinal: Normal bowel sounds, No tenderness


Musculoskeletal: No tenderness


Integumentary: No rashes


Neurological: Normal speech, Normal tone, Normal affect


Lymphatics: No axilla or inguinal lymphadenopathy





Assessment And Plan





- Current Problems (Diagnosis)


(1) Pressure ulcer of unspecified site, unspecified stage


Current Visit: Yes   Status: Chronic   


Plan: 


Patient seen by Dr Gordon and Dr. Johnson.  No need for scrotal debridgment.  

Dr Johnson would like to do the sacral wound.  However would like to do a 

colectomy.  this is in the interest of letting the wound heal without chronic 

fecal contamination.   Redd has a long history of difficulty with his hygiene 

in this regard  


5.15 plans for colectomy tomorrow. 


Qualifiers: 


   Pressure injury location: ankle   Pressure injury stage: stage 2   

Laterality: unspecified laterality   Qualified Code(s): L89.502 - Pressure ulcer

of unspecified ankle, stage 2   





(2) Hyperosmolar non-ketotic state due to type 2 diabetes mellitus


Current Visit: No   Status: Acute   


Plan: 


due to non compliance with insulin.  I had a talk with him this morning.   He 

may need therapy.  He has a  long history of non compliance.  This can be 

frustrating to his providers.  More importantly this behavior is most likely 

shortening his life expectancy.  





His sugars this morning are in the 150's   Stop the insulin drip.   Start him on

sq insulin and start feeding him 








(3) Chronic suprapubic catheter


Current Visit: Yes   Status: Chronic   


Plan: 


Is leaking.  Will consult Dr. Gordon 








(4) Chronic pain disorder


Onset Date: 11/03/17   Current Visit: No   Status: Chronic   


Plan: 


restart his hydromorphine 








(5) Spina bifida


Onset Date: 11/03/17   Current Visit: No   Status: Chronic   


Plan: 


Wheelchair bound 


Qualifiers: 


   Spinal region: lumbar 





(6) Depression


Current Visit: Yes   Status: Chronic   


Plan: 


Patient has been having a long history of non compliance with his medication.  

Discussed going to a nursing facility to improve his quality of care.  He is 

very willing. The patient has been had a history of depression.  1 suicide 

attempt in the past.  He is not actively suicidal.  Will start him an 

antidepressant and look for a long term councillor for the patient.  I need 

these reminders that life has been very unfair to Redd.  He acts out at times 

due to this.


Qualifiers: 


   Depression Type: major depressive disorder   Major depression recurrence: 

recurrent   Active/Remission status: currently active   Psychotic features: 

without psychotic features 


Discharge Plan: Home


Plan to discharge in: 24 Hours





- Code Status/Comfort Care


Code Status Assessed: No


Physician Review: Patient Assessed, Agree with Above Assessment and Plan


Critical Care: No


Time Spent Managing PTS Care (In Minutes): 20

## 2023-05-16 LAB
ALBUMIN SERPL BCP-MCNC: 2.2 G/DL (ref 3.4–5)
ALP SERPL-CCNC: 225 U/L (ref 45–117)
ALT SERPL W P-5'-P-CCNC: 80 U/L (ref 16–61)
AST SERPL W P-5'-P-CCNC: 107 U/L (ref 15–37)
BUN BLD-MCNC: 15 MG/DL (ref 7–18)
COHGB MFR BLDA: 0.9 % (ref 0–1.5)
GLUCOSE SERPLBLD-MCNC: 363 MG/DL (ref 74–106)
HCT VFR BLD CALC: 39.7 % (ref 39.6–49)
INR BLD: 1.13
LYMPHOCYTES # SPEC AUTO: 3.5 K/UL (ref 0.7–4.9)
MAGNESIUM SERPL-MCNC: 1.5 MG/DL (ref 1.6–2.4)
MCV RBC: 92.7 FL (ref 80–100)
OXYHGB MFR BLDA: 94.7 % (ref 94–97)
PMV BLD: 7.6 FL (ref 7.6–11.3)
POTASSIUM SERPL-SCNC: 3.8 MEQ/L (ref 3.5–5.1)
RBC # BLD: 4.28 M/UL (ref 4.33–5.43)
SAO2 % BLDA: 97 % (ref 92–98.5)
TROPONIN I SERPL HS-MCNC: 228.8 PG/ML (ref ?–58.9)

## 2023-05-16 PROCEDURE — 5A1945Z RESPIRATORY VENTILATION, 24-96 CONSECUTIVE HOURS: ICD-10-PCS

## 2023-05-16 PROCEDURE — 0DNE0ZZ RELEASE LARGE INTESTINE, OPEN APPROACH: ICD-10-PCS

## 2023-05-16 PROCEDURE — 0DN80ZZ RELEASE SMALL INTESTINE, OPEN APPROACH: ICD-10-PCS

## 2023-05-16 PROCEDURE — 0DNU0ZZ RELEASE OMENTUM, OPEN APPROACH: ICD-10-PCS

## 2023-05-16 PROCEDURE — 0BH17EZ INSERTION OF ENDOTRACHEAL AIRWAY INTO TRACHEA, VIA NATURAL OR ARTIFICIAL OPENING: ICD-10-PCS

## 2023-05-16 PROCEDURE — 0WJG0ZZ INSPECTION OF PERITONEAL CAVITY, OPEN APPROACH: ICD-10-PCS

## 2023-05-16 PROCEDURE — 3E0336Z INTRODUCTION OF NUTRITIONAL SUBSTANCE INTO PERIPHERAL VEIN, PERCUTANEOUS APPROACH: ICD-10-PCS

## 2023-05-16 RX ADMIN — DEXMEDETOMIDINE HYDROCHLORIDE SCH MLS/HR: 100 INJECTION, SOLUTION, CONCENTRATE INTRAVENOUS at 23:30

## 2023-05-16 RX ADMIN — HYDROMORPHONE HYDROCHLORIDE PRN MG: 1 INJECTION, SOLUTION INTRAMUSCULAR; INTRAVENOUS; SUBCUTANEOUS at 22:31

## 2023-05-16 RX ADMIN — HUMAN INSULIN SCH MLS: 100 INJECTION, SOLUTION SUBCUTANEOUS at 18:56

## 2023-05-16 RX ADMIN — SODIUM CHLORIDE SCH MLS: 0.9 INJECTION, SOLUTION INTRAVENOUS at 01:30

## 2023-05-16 RX ADMIN — SODIUM CHLORIDE SCH MLS: 0.9 INJECTION, SOLUTION INTRAVENOUS at 09:08

## 2023-05-16 RX ADMIN — MUPIROCIN SCH: 20 OINTMENT TOPICAL at 21:00

## 2023-05-16 RX ADMIN — SODIUM CHLORIDE SCH: 0.9 INJECTION, SOLUTION INTRAVENOUS at 09:14

## 2023-05-16 RX ADMIN — HYDROMORPHONE HYDROCHLORIDE PRN MG: 2 INJECTION INTRAMUSCULAR; INTRAVENOUS; SUBCUTANEOUS at 09:07

## 2023-05-16 RX ADMIN — Medication SCH ML: at 21:30

## 2023-05-16 RX ADMIN — DEXTROSE MONOHYDRATE SCH MLS/HR: 50 INJECTION, SOLUTION INTRAVENOUS at 20:30

## 2023-05-16 RX ADMIN — INSULIN GLARGINE SCH: 100 INJECTION, SOLUTION SUBCUTANEOUS at 08:09

## 2023-05-16 RX ADMIN — HUMAN INSULIN SCH: 100 INJECTION, SOLUTION SUBCUTANEOUS at 11:30

## 2023-05-16 RX ADMIN — ALBUMIN HUMAN ONE MLS: 250 SOLUTION INTRAVENOUS at 17:58

## 2023-05-16 RX ADMIN — HUMAN INSULIN SCH: 100 INJECTION, SOLUTION SUBCUTANEOUS at 07:30

## 2023-05-16 RX ADMIN — Medication SCH: at 21:00

## 2023-05-16 RX ADMIN — HYDROMORPHONE HYDROCHLORIDE PRN MG: 2 INJECTION INTRAMUSCULAR; INTRAVENOUS; SUBCUTANEOUS at 04:52

## 2023-05-16 RX ADMIN — PROMETHAZINE HYDROCHLORIDE PRN MG: 25 INJECTION INTRAMUSCULAR; INTRAVENOUS at 04:53

## 2023-05-16 RX ADMIN — MUPIROCIN SCH APPL: 20 OINTMENT TOPICAL at 08:34

## 2023-05-16 RX ADMIN — ALBUMIN HUMAN ONE MLS: 250 SOLUTION INTRAVENOUS at 18:38

## 2023-05-16 RX ADMIN — HUMAN INSULIN SCH: 100 INJECTION, SOLUTION SUBCUTANEOUS at 16:30

## 2023-05-16 RX ADMIN — Medication SCH APPL: at 08:34

## 2023-05-16 RX ADMIN — Medication SCH: at 08:09

## 2023-05-16 RX ADMIN — SODIUM CHLORIDE SCH MLS: 9 INJECTION, SOLUTION INTRAVENOUS at 18:15

## 2023-05-16 RX ADMIN — COLLAGENASE SANTYL SCH APPL: 250 OINTMENT TOPICAL at 08:34

## 2023-05-16 NOTE — RAD REPORT
EXAM DESCRIPTION:  RAD - Chest Single View - 5/16/2023 4:22 pm

 

CLINICAL HISTORY:  code

Chest pain.

 

COMPARISON:  <Comparisons>

 

FINDINGS:  Tip of the endotracheal tube is at the level of the aortic arch. Enteric tube with its tip
 in the stomach.

## 2023-05-16 NOTE — P.PN
Subjective


Date of Service: 05/16/23


Primary Care Provider: LIDA


Chief Complaint: hyperglycemia 


Subjective: No new changes (patient in pre op awaiting a coloectomy)





Review of Systems


10-point ROS is otherwise unremarkable





Physical Examination





- Vital Signs


Temperature: 98.0 F


Blood Pressure: 133/71


Pulse: 97


Respirations: 18


Pulse Ox (%): 98





- Physical Exam


General: Alert, In no apparent distress


HEENT: Atraumatic, PERRLA, EOMI


Neck: Supple, JVD not distended


Respiratory: Clear to auscultation bilaterally, Normal air movement


Cardiovascular: Regular rate/rhythm, Normal S1 S2


Gastrointestinal: Normal bowel sounds, No tenderness


Musculoskeletal: No tenderness


Integumentary: No rashes


Neurological: Normal speech, Normal tone, Normal affect


Lymphatics: No axilla or inguinal lymphadenopathy





Assessment And Plan





- Current Problems (Diagnosis)


(1) Pressure ulcer of unspecified site, unspecified stage


Current Visit: Yes   Status: Chronic   


Plan: 


Patient seen by Dr Gordon and Dr. Johnson.  No need for scrotal debridgment.  

Dr Johnson would like to do the sacral wound.  However would like to do a 

colectomy.  this is in the interest of letting the wound heal without chronic 

fecal contamination.   Redd has a long history of difficulty with his hygiene 

in this regard  


5.15 plans for colectomy tomorrow. 


Qualifiers: 


   Pressure injury location: ankle   Pressure injury stage: stage 2   

Laterality: unspecified laterality   Qualified Code(s): L89.502 - Pressure ulcer

of unspecified ankle, stage 2   





(2) Hyperosmolar non-ketotic state due to type 2 diabetes mellitus


Current Visit: No   Status: Acute   


Plan: 


due to non compliance with insulin.  I had a talk with him this morning.   He 

may need therapy.  He has a  long history of non compliance.  This can be 

frustrating to his providers.  More importantly this behavior is most likely 

shortening his life expectancy.  





His sugars this morning are in the 150's   Stop the insulin drip.   Start him on

sq insulin and start feeding him 








(3) Chronic suprapubic catheter


Current Visit: Yes   Status: Chronic   


Plan: 


Is leaking.  Will consult Dr. Gordon 








(4) Chronic pain disorder


Onset Date: 11/03/17   Current Visit: No   Status: Chronic   


Plan: 


restart his hydromorphine 








(5) Spina bifida


Onset Date: 11/03/17   Current Visit: No   Status: Chronic   


Plan: 


Wheelchair bound 


Qualifiers: 


   Spinal region: lumbar 





(6) Depression


Current Visit: Yes   Status: Chronic   


Plan: 


Patient has been having a long history of non compliance with his medication.  

Discussed going to a nursing facility to improve his quality of care.  He is 

very willing. The patient has been had a history of depression.  1 suicide 

attempt in the past.  He is not actively suicidal.  Will start him an 

antidepressant and look for a long term councillor for the patient.  I need 

these reminders that life has been very unfair to Redd.  He acts out at times 

due to this.


Qualifiers: 


   Depression Type: major depressive disorder   Major depression recurrence: re

current   Active/Remission status: currently active   Psychotic features: 

without psychotic features 


Discharge Plan: Home


Plan to discharge in: 24 Hours





- Code Status/Comfort Care


Code Status Assessed: No


Physician Review: Patient Assessed, Agree with Above Assessment and Plan


Critical Care: No


Time Spent Managing PTS Care (In Minutes): 20

## 2023-05-16 NOTE — CON
Date of Consultation:  05/09/2023



Brief History Of Present Illness:  The patient is a 37-year-old male, who has a complicated history o
f spina bifida, diabetes, diverting urostomy, essential paralysis below the waist, who presents with 
noncompliance of his diabetic management with a blood sugar in 900 range.  He was noted to have durin
g his admission scrotal wound and some perineal wounds at this point of concern.  As such, I was cons
ulted for management of the patient's perineal wounds.  The patient states he has minimal sensation t
o this area.  He does have chronic pain as well and numerous hospitalizations at the hospital at [facility]
 for the same said reason.  Apparently, the patient is poorly compliant with respect to follow up wit
jyoti Mc, wound care management, diabetic management, and his overall medical condition.



Past Medical History:  Significant for spina bifida with hydrocephalus, depression, insomnia, GERD, c
hronic pain syndrome, muscle wasting, paraplegia.



Past Surgical History:  Includes a suprapubic catheter insertion, shunt revisions, hydrocephalus shun
t, cholecystectomy, foot surgery, bilateral hip surgery, appendectomy.



Home Medications:  Included Dilaudid, Zestril, and insulin.



Allergies:  TO LEVAQUIN, MORPHINE, BACTRIM, TORADOL, PENICILLIN, VANCOMYCIN, ZOFRAN, AUGMENTIN, CIPRO
, DOXYCYCLINE, SESAME SEEDS, POPCORN.



Social History:  He is single, has no children.  Disabled.  He smokes cigarettes and drinks alcohol r
ecreationally.



Family History:  Significant for hypertension in his father and mother.



Review of Systems:

A 10-point review of systems other than HPI, denies.



Physical Examination:

Vital Signs:  At the time of my examination; his BMI was 52.1, blood pressure 119/67, pulse 69, respi
ratory rate 18, temperature 98.5. 

General:  He is awake, alert, and oriented. 

Psychiatric:  He is appropriate, conversive. 

General Appearance:  He has super morbid obesity. 

HEENT:  Normocephalic.  His sclerae are anicteric.  His mucous membranes are moist.  His oropharynx i
s clear. 

Neck:  Supple without JVD. 

Chest:  Normal expansion and excursion. 

Cardiovascular:  Regular rate and rhythm. 

Pulmonary:  Clear to auscultation bilaterally. 

Abdomen:  Soft, nontender, nondistended.  No rebound.  No guarding.  No focal peritonitis.  Suprapubi
c catheter is in place. 

Extremities:  Focused examination of his extremities, he has a dorsal angulation of his lower extremi
ties consistent with spina bifida history. 

Skin:  Focused examination of the skin; he has scrotal necrosis of skin, which appears perhaps superf
icial perineal wounds only have inflammation and some cellulitis in the area.  No drainable collectio
ns.  No necrosis to his perineum.  He does have swelling of his wound at this time with stool.



Laboratory Data:  He had a laboratory exam, which revealed a white blood cell count of 10.6, hemoglob
in 7.7, hematocrit 35.8, platelet count was 554.  His chemistry had a sodium of 140, potassium 3.7, c
hloride 113, carbon dioxide is 26, BUN 12, creatinine 0.5, glucose is 188, it was 900 on admission.  
Alkaline phosphatase is 146.  He had an abdomen and pelvis CT on 05/05, which was officially read as 
new mild right hydroureteronephrosis without evidence of obstructing calculus related to an infection
, new central hypoattenuation along the small region of the soft tissue thickening involving the lowe
r anterior midline abdominal wall musculatures could be related to developing small collection or abs
cess, tract like soft tissue thickening extending from the sacral ulcer left the midline to the level
 of posterior perirectal space and posterior wall of the anal canal stable, soft tissue thickening an
d edema at the level of perineum withdrawal/dressings, however, no deep subcutaneous soft fat strandi
ng appreciable, fluid collection or gas noted, peritoneal portion of the  shunt catheter is present
.



Assessment And Plan:  This is a 37-year-old male, who comes in with chronic perineal and scrotal woun
ds and paraplegia.

1.IV fluid hydration.

2.Antibiotic coverage.

3.Continue medical management.

4.Continue local wound management with Vashe and pressure reduction strategies to the area.

5.Dr. Gordon' consultation appreciated, his recommendations noted.

6.I have discussed wound care with the patient including the above stated plan.  Please see Dr. Cristian weaver' notes regarding his plan for scrotal wounds.

7.I have discussed that if the patient continues to have decline of his wound, we should consider a 
diverting colostomy as he continues to have swelling of his wounds and this is apparently a chronic i
ssue per the patient's report as well as per Dr. Mc's notes and discussion with Dr. Mc.  I have
 discussed risks, benefits, and alternatives of diverting colostomy including, but not limited to ble
eding, infection, damage to surrounding tissues, injury to internal organs, need for further operatio
ns, hernias, bowel obstructions, injury to his peritoneal shunt and other potential unforeseen compli
cations related to anesthesia, blood clots, heart attack, stroke, and other possible unforeseen compl
ications.  The patient agrees to proceed as indicated. 

Thank you for this interesting consult.





RADHA/MAGEN

DD:  05/16/2023 09:01:01Voice ID:  [id number]

DT:  05/16/2023 12:26:34Report ID:  [id number]

## 2023-05-16 NOTE — RAD REPORT
EXAM DESCRIPTION:  US - Extrem Venous W Compress Alfonzo - 5/16/2023 7:27 pm

 

CLINICAL HISTORY:  dvt

Bilateral leg edema and swelling.

 

COMPARISON:  EXT VENOUS W COMPRESSION ALFONZO dated 10/18/2012

 

TECHNIQUE:  Real-time sonographic interrogation of the left and right lower extremity deep venous sys
tems was performed.

 

FINDINGS:  Exam is technically very limited. Within this limitation, no gross DVT seen.

 

IMPRESSION:  Technically limited study without gross evidence of DVT.

## 2023-05-16 NOTE — P.PN
Subjective


Date of Service: 05/16/23


Primary Care Provider: LIDA


Chief Complaint: hyperglycemia 





Patient remains intubated and minimally responsive intermittently








Physical Examination





- Vital Signs


Temperature: 97.2 F


Blood Pressure: 96/65


Pulse: 152


Respirations: 26


Pulse Ox (%): 96





- Physical Exam


General: Mild distress


HEENT: Mucous membr. moist/pink, Sclerae nonicteric


Neck: Supple


Respiratory: Clear to auscultation bilaterally, Normal air movement


Cardiovascular: Other (tachycardia)


Gastrointestinal: Other (wound dressings in place)


Musculoskeletal: Swelling


Integumentary: Skin breakdown (perirectal wounds are stable, no necrosis, + 

cellulitis)


Urinary: Contreras catheter, Suprapubic catheter


External genitalia: Other (wounds stable on scrotum)


Rectal: Induration





- Studies


Medications List Reviewed: Yes





Assessment And Plan





- Current Problems (Diagnosis)


(1) S/P exploratory laparotomy


Current Visit: Yes   Status: Acute   


Plan: 











Patient is a 37 year old man s/p Exploratory Laparotomy, Adhesiolysis with 

primary closure on 5-





Gen/ Neuro: No pain medication Rx currently given due to hemodynamic instability


CVS: sinus tachycardia, hypotensive on levophed and phenylepherine ggt. Albumin 

and fluid bolus given, troponins elevated, will trend, Dr. Stringer Consulted


Pulm: ventilator dependent, oxygenating well, had some intermittent episodes of 

decreased end tidal CO2 to 20s, but brief episodes only, continue vent 

management, wean PRN, recommend CT PE protocol to rule out pulmonary emboli, 

ultrasound of bilateral lower extremities are limited but no obvious DVT noted, 

Dr. Bacon consulted


GI: serial exams


FEN: fluid boluses with LR to continue PRN, Electrolyte replacement protocol for

Mg, Phos, K, nutrition- will hold on nutrition for now while unstable, continue 

IV fluid warmer, hot air blanket ([employer])


Endo: hyperglycemia - insulin GGT, will wean to protocol when responsive,  

cortisol level normal, hold steroid usage for now,


Renal: monitor urine output via both suprapubic and contreras catheters, creatinine 

normal post op


ID: Merropenem, patient was given 1 dose of Rocephin preoperatively


Prophylaxis: Lovenox, will consider PPI if not able to wean off vent


Tubes / Lines: PICC line, LEFT femoral central line, RIGHT arterial line, 

suprapubic catheter, contreras catheter, NG tube, ET tube, rectal temp probe


Heme: Hbg stable on post op check and via ABG 


PT/OT - wait for now, 


Placement: will address after hemodynamically improved


Wound: continue wound protection with santyl, Vashe, daily, cover with occlusive

dressings away from fecal stream as much as possible, consider rectal tube

















Physician Review: Patient Assessed, Agree with Above Assessment and Plan

## 2023-05-16 NOTE — P.OP
Preoperative diagnosis: Fecal Incontenence / Perineal Wound Soilage


Postoperative diagnosis: Fecal Incontenence / Perineal Wound Soilage


Primary procedure: Exploratory Laparotomy converted to exploratory laparotomy


Secondary procedure: Lysis of adhesions


Other procedure(s): Placement of LEFT femoral Central venous catheter


Anesthesia: GETA + Local


Estimated blood loss: ~ 20cc


Specimen: none


Findings: Dense Abdominal Adhesions


Complications: Other (Patient became hemodynamically unstable precluding 

completion of surgery)


Transferred to: ICU


Condition: Critical

## 2023-05-17 LAB
ALBUMIN SERPL BCP-MCNC: 2.6 G/DL (ref 3.4–5)
ALP SERPL-CCNC: 157 U/L (ref 45–117)
ALT SERPL W P-5'-P-CCNC: 102 U/L (ref 16–61)
AST SERPL W P-5'-P-CCNC: 198 U/L (ref 15–37)
BUN BLD-MCNC: 24 MG/DL (ref 7–18)
COHGB MFR BLDA: 1.3 % (ref 0–1.5)
GIANT PLATELETS BLD QL SMEAR: (no result)
GLUCOSE SERPLBLD-MCNC: 217 MG/DL (ref 74–106)
HCT VFR BLD CALC: 22.3 % (ref 39.6–49)
HCT VFR BLD CALC: 23.9 % (ref 39.6–49)
HCT VFR BLD CALC: 24.2 % (ref 39.6–49)
INR BLD: 1.3
LYMPHOCYTES # SPEC AUTO: 2.8 K/UL (ref 0.7–4.9)
MAGNESIUM SERPL-MCNC: 1.9 MG/DL (ref 1.6–2.4)
MCV RBC: 91.7 FL (ref 80–100)
MORPHOLOGY BLD-IMP: (no result)
OXYHGB MFR BLDA: 96.5 % (ref 94–97)
PMV BLD: 7.1 FL (ref 7.6–11.3)
POLYCHROMASIA BLD QL SMEAR: (no result)
POTASSIUM SERPL-SCNC: 3.9 MEQ/L (ref 3.5–5.1)
RBC # BLD: 2.6 M/UL (ref 4.33–5.43)
SAO2 % BLDA: 99.4 % (ref 92–98.5)
TROPONIN I SERPL HS-MCNC: 558.4 PG/ML (ref ?–58.9)

## 2023-05-17 RX ADMIN — DAPTOMYCIN SCH MLS: 500 INJECTION, POWDER, LYOPHILIZED, FOR SOLUTION INTRAVENOUS at 10:59

## 2023-05-17 RX ADMIN — SODIUM CHLORIDE SCH MLS: 0.9 INJECTION, SOLUTION INTRAVENOUS at 11:18

## 2023-05-17 RX ADMIN — HYDROMORPHONE HYDROCHLORIDE PRN MG: 2 INJECTION INTRAMUSCULAR; INTRAVENOUS; SUBCUTANEOUS at 11:17

## 2023-05-17 RX ADMIN — HUMAN INSULIN SCH UNIT: 100 INJECTION, SOLUTION SUBCUTANEOUS at 21:08

## 2023-05-17 RX ADMIN — HYDROMORPHONE HYDROCHLORIDE PRN MG: 1 INJECTION, SOLUTION INTRAMUSCULAR; INTRAVENOUS; SUBCUTANEOUS at 02:40

## 2023-05-17 RX ADMIN — HUMAN INSULIN SCH MLS: 100 INJECTION, SOLUTION SUBCUTANEOUS at 01:35

## 2023-05-17 RX ADMIN — SODIUM CHLORIDE SCH MLS: 9 INJECTION, SOLUTION INTRAVENOUS at 09:10

## 2023-05-17 RX ADMIN — HYDROMORPHONE HYDROCHLORIDE PRN MG: 1 INJECTION, SOLUTION INTRAMUSCULAR; INTRAVENOUS; SUBCUTANEOUS at 18:20

## 2023-05-17 RX ADMIN — INSULIN GLARGINE SCH UNIT: 100 INJECTION, SOLUTION SUBCUTANEOUS at 09:14

## 2023-05-17 RX ADMIN — Medication SCH APPL: at 09:00

## 2023-05-17 RX ADMIN — DEXTROSE MONOHYDRATE SCH MLS/HR: 50 INJECTION, SOLUTION INTRAVENOUS at 01:54

## 2023-05-17 RX ADMIN — Medication SCH ML: at 21:08

## 2023-05-17 RX ADMIN — NOREPINEPHRINE BITARTRATE SCH MLS/HR: 4 INJECTION, SOLUTION INTRAVENOUS at 16:04

## 2023-05-17 RX ADMIN — Medication SCH: at 09:00

## 2023-05-17 RX ADMIN — HUMAN INSULIN SCH UNIT: 100 INJECTION, SOLUTION SUBCUTANEOUS at 16:39

## 2023-05-17 RX ADMIN — HYDROMORPHONE HYDROCHLORIDE PRN MG: 1 INJECTION, SOLUTION INTRAMUSCULAR; INTRAVENOUS; SUBCUTANEOUS at 08:23

## 2023-05-17 RX ADMIN — SODIUM CHLORIDE SCH MLS: 0.9 INJECTION, SOLUTION INTRAVENOUS at 00:08

## 2023-05-17 RX ADMIN — SODIUM CHLORIDE SCH MLS: 9 INJECTION, SOLUTION INTRAVENOUS at 16:09

## 2023-05-17 RX ADMIN — HYDROMORPHONE HYDROCHLORIDE PRN MG: 1 INJECTION, SOLUTION INTRAMUSCULAR; INTRAVENOUS; SUBCUTANEOUS at 12:36

## 2023-05-17 RX ADMIN — HUMAN INSULIN SCH UNIT: 100 INJECTION, SOLUTION SUBCUTANEOUS at 11:55

## 2023-05-17 RX ADMIN — SODIUM CHLORIDE SCH MLS/HR: 0.9 INJECTION, SOLUTION INTRAVENOUS at 14:16

## 2023-05-17 RX ADMIN — ENOXAPARIN SODIUM SCH: 40 INJECTION SUBCUTANEOUS at 19:38

## 2023-05-17 RX ADMIN — DEXMEDETOMIDINE HYDROCHLORIDE SCH MLS/HR: 100 INJECTION, SOLUTION, CONCENTRATE INTRAVENOUS at 08:36

## 2023-05-17 RX ADMIN — Medication SCH ML: at 09:14

## 2023-05-17 RX ADMIN — DEXMEDETOMIDINE HYDROCHLORIDE SCH MLS/HR: 100 INJECTION, SOLUTION, CONCENTRATE INTRAVENOUS at 03:11

## 2023-05-17 RX ADMIN — NOREPINEPHRINE BITARTRATE SCH MLS/HR: 4 INJECTION, SOLUTION INTRAVENOUS at 09:17

## 2023-05-17 RX ADMIN — MUPIROCIN SCH APPL: 20 OINTMENT TOPICAL at 21:07

## 2023-05-17 RX ADMIN — MUPIROCIN SCH APPL: 20 OINTMENT TOPICAL at 09:00

## 2023-05-17 RX ADMIN — SODIUM CHLORIDE SCH MLS/HR: 0.9 INJECTION, SOLUTION INTRAVENOUS at 19:30

## 2023-05-17 RX ADMIN — Medication SCH: at 19:39

## 2023-05-17 RX ADMIN — SODIUM CHLORIDE SCH MLS: 9 INJECTION, SOLUTION INTRAVENOUS at 00:08

## 2023-05-17 RX ADMIN — DEXMEDETOMIDINE HYDROCHLORIDE SCH MLS/HR: 100 INJECTION, SOLUTION, CONCENTRATE INTRAVENOUS at 06:09

## 2023-05-17 RX ADMIN — COLLAGENASE SANTYL SCH APPL: 250 OINTMENT TOPICAL at 09:14

## 2023-05-17 RX ADMIN — SODIUM CHLORIDE SCH: 0.9 INJECTION, SOLUTION INTRAVENOUS at 17:45

## 2023-05-17 NOTE — P.PN
Patient Education     Eating Heart-Healthy Foods  Eating has a big impact on your heart health. In fact, eating healthier can improve several of your heart risks at once. For instance, it helps you manage weight, cholesterol, and blood pressure. Here are ideas to help you make heart-healthy changes without giving up all the foods and flavors you love.  Getting started  · Talk with your healthcare provider about eating plans, such as the DASH or Mediterranean diet. You may also be referred to a dietitian.  · Change a few things at a time. Give yourself time to get used to a few eating changes before adding more.  · Work to create a tasty, healthy eating plan that you can stick to for the rest of your life.    Goals for healthy eating  Below are some tips to improve your eating habits:  · Limit saturated fats and trans fats. Saturated fats raise your levels of cholesterol, so keep these fats to a minimum. They are found in foods such as fatty meats, whole milk, cheese, and palm and coconut oils. Avoid trans fats because they lower good cholesterol as well as raise bad cholesterol. Trans fats are most often found in processed foods.  · Reduce sodium (salt) intake. Eating too much salt may increase your blood pressure. Limit your sodium intake to 2,300 milligrams (mg) per day (the amount in 1 teaspoon of salt), or less if your healthcare provider recommends it. Dining out less often and eating fewer processed foods are two great ways to decrease the amount of salt you consume.  · Managing calories. A calorie is a unit of energy. Your body burns calories for fuel, but if you eat more calories than your body burns, the extras are stored as fat. Your healthcare provider can help you create a diet plan to manage your calories. This will likely include eating healthier foods as well as exercising regularly. To help you track your progress, keep a diary to record what you eat and how often you exercise.  Choose the right  Subjective


Date of Service: 05/17/23


Primary Care Provider: LIDA


Chief Complaint: hyperglycemia 


Subjective: New changes (patient on a vent with pressors on board)





Review of Systems


is unable to be obtained





Physical Examination





- Vital Signs


Temperature: 99.8 F


Blood Pressure: 98/54


Pulse: 117


Respirations: 26


Pulse Ox (%): 100





- Physical Exam


General: Alert, Moderate distress


HEENT: Atraumatic, PERRLA, EOMI


Neck: Supple, JVD not distended


Respiratory: Clear to auscultation bilaterally, Normal air movement


Cardiovascular: Regular rate/rhythm, Normal S1 S2


Gastrointestinal: Normal bowel sounds, No tenderness


Musculoskeletal: No tenderness


Integumentary: No rashes


Neurological: Normal speech, Normal tone, Normal affect


Lymphatics: No axilla or inguinal lymphadenopathy





- Studies


Medications List Reviewed: Yes





Assessment And Plan





- Current Problems (Diagnosis)


(1) Shock circulatory


Current Visit: Yes   Status: Acute   


Plan: 


surgery not completed. Vented, on pressors.  Will work on getting his blood 

pressure stablized and the getting him off the vent 








(2) Pressure ulcer of unspecified site, unspecified stage


Current Visit: Yes   Status: Chronic   


Plan: 


Patient seen by Dr Gordon and Dr. Johnson.  No need for scrotal debridgment.  

Dr Johnson would like to do the sacral wound.  However would like to do a 

colectomy.  this is in the interest of letting the wound heal without chronic 

fecal contamination.   Redd has a long history of difficulty with his hygiene 

in this regard  


5.15 plans for colectomy tomorrow. 


Qualifiers: 


   Pressure injury location: ankle   Pressure injury stage: stage 2   

Laterality: unspecified laterality   Qualified Code(s): L89.502 - Pressure ulcer

of unspecified ankle, stage 2   





(3) Hyperosmolar non-ketotic state due to type 2 diabetes mellitus


Current Visit: No   Status: Acute   


Plan: 


due to non compliance with insulin.  I had a talk with him this morning.   He 

may need therapy.  He has a  long history of non compliance.  This can be 

frustrating to his providers.  More importantly this behavior is most likely 

shortening his life expectancy.  





His sugars this morning are in the 150's   Stop the insulin drip.   Start him on

sq insulin and start feeding him 








(4) Chronic suprapubic catheter


Current Visit: Yes   Status: Chronic   


Plan: 


Is leaking.  Will consult Dr. Gordon 








(5) Chronic pain disorder


Onset Date: 11/03/17   Current Visit: No   Status: Chronic   


Plan: 


restart his hydromorphine 








(6) Spina bifida


Onset Date: 11/03/17   Current Visit: No   Status: Chronic   


Plan: 


Wheelchair bound 


Qualifiers: 


   Spinal region: lumbar 





(7) Depression


Current Visit: Yes   Status: Chronic   


Plan: 


Patient has been having a long history of non compliance with his medication.  

Discussed going to a nursing facility to improve his quality of care.  He is 

very willing. The patient has been had a history of depression.  1 suicide 

attempt in the past.  He is not actively suicidal.  Will start him an 

antidepressant and look for a long term councillor for the patient.  I need 

these reminders that life has been very unfair to Redd.  He acts out at times 

due to this.


Qualifiers: 


   Depression Type: major depressive disorder   Major depression recurrence: 

recurrent   Active/Remission status: currently active   Psychotic features: 

without psychotic features 


Discharge Plan: Home


Plan to discharge in: Greater than 2 days





- Code Status/Comfort Care


Code Status Assessed: No


Physician Review: Patient Assessed, Agree with Above Assessment and Plan


Critical Care: Yes


Time Spent Managing PTS Care (In Minutes): 20 foods  Aim to make these foods staples of your diet. If you have diabetes, you may have different recommendations than what is listed here:  · Fruits and vegetables provide plenty of nutrients without a lot of calories. At meals, fill half your plate with these foods. Split the other half of your plate between whole grains and lean protein.  · Whole grains are high in fiber and rich in vitamins and nutrients. Good choices include whole-wheat bread, pasta, and brown rice.  · Lean proteins give you nutrition with less fat. Good choices include fish, skinless chicken, and beans.  · Low-fat or nonfat dairy provides nutrients without a lot of fat. Try low-fat or nonfat milk, cheese, or yogurt.  · Healthy fats can be good for you in small amounts. These are unsaturated fats, such as olive oil, nuts, and fish. Try to have at least 2 servings per week of fatty fish, such as salmon, sardines, mackerel, rainbow trout, and albacore tuna. These contain omega-3 fatty acids, which are good for your heart. Flaxseed is another source of a heart-healthy fat.  More on heart-healthy eating  Read food labels  Healthy eating starts at the grocery store. Be sure to pay attention to food labels on packaged foods. Look for products that are high in fiber and protein, and low in saturated fat, cholesterol, and sodium. Avoid products that contain trans fat. And pay close attention to serving size. For instance, if you plan to eat two servings, double all the numbers on the label.  Prepare food right  A key part of healthy cooking is cutting down on added fat and salt. Look on the internet for lower-fat, lower-sodium recipes. Also, try these tips:  · Remove fat from meat and skin from poultry before cooking.  · Skim fat from the surface of soups and sauces.  · Broil, boil, bake, steam, grill, and microwave food without added fats.  · Choose ingredients that spice up your food without adding calories, fat, or sodium. Try these items:  horseradish, hot sauce, lemon, mustard, nonfat salad dressings, and vinegar. For salt-free herbs and spices, try basil, cilantro, cinnamon, pepper, and rosemary.  Date Last Reviewed: 10/1/2017  © 3043-3857 [employer]. [address] [place] [address]. All rights reserved. This information is not intended as a substitute for professional medical care. Always follow your healthcare professional's instructions.           Patient Education     Exercise for a Healthier Heart     Exercise with a friend. When activity is fun, you're more likely to stick with it.   You may wonder how you can improve the health of your heart. If you’re thinking about exercise, you’re on the right track. You don’t need to become an athlete. But you do need a certain amount of brisk exercise to help strengthen your heart. If you have been diagnosed with a heart condition, your healthcare provider may advise exercise to help stabilize your condition. To help make exercise a habit, choose safe, fun activities.   Before you start  Check with your healthcare provider before starting an exercise program. This is especially important if you have not been active for a while. It's also important if you have a long-term (chronic) health problem such as heart disease, diabetes, or obesity. Or if you are at high risk for having these problems.   Why exercise?  Exercising regularly offers many healthy rewards. It can help you do all of the following:  · Improve your blood cholesterol level to help prevent further heart trouble  · Lower your blood pressure to help prevent a stroke or heart attack  · Control diabetes, or reduce your risk of getting this disease  · Improve your heart and lung function  · Reach and stay at a healthy weight  · Make your muscles stronger so you can stay active  · Prevent falls and fractures by slowing the loss of bone mass (osteoporosis)  · Manage stress better  · Reduce your blood pressure  · Improve  your sense of self and your body image  Exercise tips    · Ease into your routine. Set small goals. Then build on them. If you are not sure what your activity level should be, talk with your healthcare provider first before starting an exercise routine.  · Exercise on most days. Aim for a total of 150 minutes (2 hours and 30 minutes) or more of moderate-intensity aerobic activity each week. Or 75 minutes (1 hour and 15 minutes) or more of vigorous-intensity aerobic activity each week. Or try for a combination of both. Moderate activity means that you breathe heavier and your heart rate increases but you can still talk. Think about doing 40 minutes of moderate exercise, 3 to 4 times a week. For best results, activity should last for about 40 minutes to lower blood pressure and cholesterol. It's OK to work up to the 40-minute period over time. Examples of moderate-intensity activity are walking 1 mile in 15 minutes. Or doing 30 to 45 minutes of yard work.  · Step up your daily activity level.  Along with your exercise program, try being more active the whole day. Walk instead of drive. Or park further away so that you take more steps each day. Do more household tasks or yard work. You may not be able to meet the advised mount of physical activity. But doing some moderate- or vigorous-intensity aerobic activity can help reduce your risk for heart disease. Your healthcare provider can help you figure out what is best for you.  · Choose 1 or more activities you enjoy.  Walking is one of the easiest things you can do. You can also try swimming, riding a bike, dancing, or taking an exercise class.    When to call your healthcare provider  Call your healthcare provider if you have any of these:   · Chest pain or feel dizzy or lightheaded  · Burning, tightness, pressure, or heaviness in your chest, neck, shoulders, back, or arms  · Abnormal shortness of breath  · More joint or muscle pain  · A very fast or irregular  heartbeat (palpitations)  Sasha last reviewed this educational content on 7/1/2019  © 4193-5274 [employer]. [address] [place] [address], [place] [address]. All rights reserved. This information is not intended as a substitute for professional medical care. Always follow your healthcare professional's instructions.         f/u in one month

## 2023-05-17 NOTE — OP
Date of Procedure:  05/16/2023



Surgeon:  Dandre Johnson MD, MD



Indications:  The patient is a 37-year-old male who has a history of cerebral palsy, who presented to
 the hospital with multiple medical problems including severe hyperglycemia with a blood sugar over 9
00, who ultimately had presented with wounds of his perineum and scrotal areas with significant injur
y to these areas.  He was seen initially by myself in consultation for discussion of management of hi
s chronic perineal wounds and Dr. Gordon of Urology saw him for his scrotal wounds.  As such, we opt
ed for nonoperative management at that time for his current wound situation; however, he did have shivam
e improvement of his wounds during his hospitalization, but continued to soil his wounds with stool a
nd would often have large amounts of stool contaminating the various areas.  I had initially discusse
d colostomy with diversion of stool, for a temporary diverting colostomy to help divert the fecal str
eam as continued contamination seemed to make the wounds significantly worse.  They continued to wors
en over the course of his hospitalization and we tried medical management initially.  Ultimately, the
 patient opted for surgical management with colonic diversion.



Preoperative Diagnosis:  Fecal incontinence/perineal wound soilage.



Postoperative Diagnosis:  Fecal incontinence/perineal wound soilage.



Procedures Performed:  

1.Exploratory laparoscopy converted to exploratory laparotomy.

2.Lysis of adhesions.

3.Placement of a left femoral venous catheter.



Anesthesia:  General endotracheal plus local with 0.25% Marcaine without epinephrine.



Estimated Blood Loss:  20 cc.



Specimen:  None.



Findings:  The patient had dense abdominal adhesions and a large pendulous omentum with significant a
dhesions as well.



Complications:  The patient became hemodynamically unstable during the procedure, precluding a safe c
ompletion of the procedure.  The patient transferred to ICU in critical condition.



Procedure In Detail:  After informed consent was obtained, whereby I discussed the risks, benefits, a
nd alternatives of the above procedure including but not limited to bleeding, infection, damage to in
ternal organs, need for further operation procedures, blood clot, stroke, heart attacks, other unfore
seen complications related to anesthesia, and other unforeseen complications in the perioperative per
iod.  The patient was prepped and draped in the usual sterile fashion.  After adequate anesthesia was
 achieved, I initially placed an injection of 0.25% Marcaine in the supraumbilical position.  I anest
hetized the skin and introduced a 5 mm 0-degree optical trocar into the abdomen without evidence of c
omplication.  Insufflation was obtained to 15 mmHg at this time.  There was no injury to vital struct
ures upon entry into the abdomen.  I inspected the area and found there to be significant scar preclu
ding an expeditious takedown using laparoscopic approach and as such, at this point, I opted to open 
the patient for a proper exploratory laparotomy.  The patient did have some adhesions to the midline,
 but the majority of them were not in an area that precluded safe entry into the abdominal compartmen
t as a safe zone was identified by diagnostic laparoscopy.  At this point, I left the pneumoperitoneu
m in place, removed the trocar, and converted the procedure to exploratory laparotomy at this point. 
 I used a 10 blade down to subcutaneous tissues and dissected down through fat planes using electroca
utery to expose the abdominal midline fascia at the linea alba.  This was opened sharply at this poin
t, and ultimately the peritoneum was grasped, elevated, and entered sharply with Metzenbaum scissors.
  The abdomen was entered safely at this point without any injury to any vital structures.  I then op
ened the abdomen through the upper midline incision and just to below the infraumbilical position.  A
fter this was performed, I performed an extensive adhesiolysis removing predominantly colon from the 
anterior abdominal wall as well as small bowel from the abdominal wall and omental attachments.  The 
omentum was quite thickened and had a significant adipose burden to it.  I was able to mobilize the t
ransverse colon and I continued to track laterally to mobilize the transverse colon at this point and
 near the splenic flexure.  Blood loss was minimal throughout the procedure.  There were no specific 
significant hemodynamic maneuvers required other than simple electrocautery.  There were no bowel inj
uries were appreciated throughout.  A peritoneal shunt was then visualized and left in its anatomic p
osition, which was somewhat curled extending down into the pelvis.  This was protected throughout.  A
t this point, after mobilizing the colon, I opted for a mesenteric window near the distal transverse 
colon near the flexure as this was a nice and mobilized segment of the colon at this point.  I then g
rasped the skin on the surface after previously being marked in the preoperative area for an appropri
ate location for his colostomy.  I then removed the skin ellipse at this point using a 10 blade.  I d
issected down, removing some adipose tissue and exposed the abdominal wall over the rectus muscle at 
this point.  I then made a cruciate incision over the anterior rectus sheath and spread the fibers of
 the rectus muscle laterally sparing them throughout.  I then placed my hand in the peritoneal cavity
 and protected the entry point for the colostomy at this point.  I opened the peritoneal lining at th
is point with a cruciate incision through the peritoneum in the posterior fascia without incident.  I
 passed 2 fingers to the area and ultimately prepared to perform the colostomy at this point.  After 
this was completed, I inspected the abdomen and there was no additional bleeding and the colon was ni
bee mobilized.  At this point, my Anesthesia personnel notified me that the patient began to have so
me hemodynamic instability with alternating tachycardia up into the 160s and bradycardia down to the 
40s and 30s.  Additionally, the patient's blood pressure varied widely, extending down to over 60/30 
and ultimately the Anesthesia Team had difficulty in obtaining blood pressures.  At this point, I opt
ed to abandon the procedure as the patient became hemodynamically unstable and at this point, I opted
 to close the patient's abdomen in an expeditious fashion.  Therefore, I cleansed the area of the abd
ominal compartment quickly, suctioned out whatever effluent there was, and put the abdominal FISH in 
place to protect the intraabdominal contents.  I closed the colostomy site using a running #1 PDS sut
ure with good approximation of the fascia throughout.  At this point, I then turned my attention to t
he main midline laparotomy and closed the midline laparotomy with the abdominal FISH protecting and c
losed the patient's midline laparotomy incision with a #1 looped PDS with good approximation of the t
issues at this point.  We then copiously irrigated the deep adipose tissues on both the colostomy sit
e as well as the midline laparotomy and then the skin was closed in both locations with interrupted s
taples and a sterile dressing placed over top.  The patient tolerated the procedure as described abov
e with hemodynamic instability and as such, the procedure was abandoned.  The patient had minimal blo
od loss throughout the procedure of less than 20 cc throughout.  The patient was transferred to the P
ACU in serious/critical condition.  The patient was started on pressor support in the operating room.
  All counts were correct at the end of the case and were counted multiple times to ensure that all c
ounts were correct, which was verified on 3 separate occasions.  The patient will be transferred to [facility] ICU for ongoing care and workup for the etiology of his hemodynamic instability.





TK/MODL

DD:  05/16/2023 20:47:45Voice ID:  [id number]

DT:  05/17/2023 00:41:36Report ID:  [id number]

## 2023-05-17 NOTE — P.PN
Subjective


Date of Service: 05/17/23


Primary Care Provider: LIDA


Chief Complaint: hyperglycemia 





Patient remains intubated and minimally responsive intermittently, now on 

precedex as he awoke overnight











Physical Examination





- Vital Signs


Temperature: 99.8 F


Blood Pressure: 98/54


Pulse: 117


Respirations: 26


Pulse Ox (%): 100





- Physical Exam


General: Other (intubated, sedated on ventilator with precedex, but responds 

appropriately to questions with non-verbal responses)


HEENT: Mucous membr. moist/pink


Neck: Supple


Respiratory: Clear to auscultation bilaterally, Normal air movement (Ventilated 

AC mode, clear bilaterally)


Cardiovascular: Other (tachycardia but improved from last evening)


Gastrointestinal: Soft and benign, Non-distended, No ascites, No tenderness, No 

masses, No rebound, No guarding, Other (incision clean and dry, staples in 

place, blood on dressings)


Musculoskeletal: Swelling


Integumentary: Other (perineal wounds stable, + breakdown, wounds not currently 

soiled)


Rectal: Other (stable wound in perineal region - no drainable collections)





- Studies


Medications List Reviewed: Yes





Assessment And Plan





- Current Problems (Diagnosis)


(1) S/P exploratory laparotomy


Current Visit: Yes   Status: Acute   


Plan: 











Patient is a 37 year old man s/p Exploratory Laparotomy, Adhesiolysis with 

primary closure on 5-





Gen/ Neuro: No pain medication Rx currently given due to hemodynamic 

instability, has become responsive, asking for pain medication, has been given 

dilaudid PRN


CVS: sinus tachycardia, hypotensive on levophed and phenylepherine ggt. Albumin 

and fluid bolus given, troponins elevated, will trend, Dr. Stringer Consulted, 

improved tachycardia from last evening. 


Pulm: ventilator dependent, oxygenating well,  continue vent management, wean 

PRN, recommend CT PE protocol to rule out pulmonary emboli, ultrasound of 

bilateral lower extremities are limited but no obvious DVT noted, Dr. Bacon 

consulted, chest x ray - mild atelectasis, daily chest x ray.


GI: serial exams, blood tinged dressings.


FEN: fluid boluses with LR to continue PRN, Electrolyte replacement protocol for

Mg, Phos, K, nutrition- will hold on nutrition for now while unstable, continue 

IV fluid warmer, hot air blanket ([employer])


Endo: hyperglycemia - insulin GGT, will wean to protocol when responsive,  corti

sol level normal, hold steroid usage for now, 


Renal: monitor urine output via both suprapubic and contreras catheters, creatinine 

elevated - oliguira 


ID: Merropenem, patient was given 1 dose of Rocephin preoperatively, started 

daptomycin 


Prophylaxis: Lovenox, will consider PPI if not able to wean off vent 


Tubes / Lines: PICC line, LEFT femoral central line, RIGHT arterial line, 

suprapubic catheter, contreras catheter, NG tube, ET tube, rectal temp probe


Heme: Hbg dropped today, likely mostly dilutional as he is > 4 liters positive 

after surgery in addition recieved albumin x 3 runs,  will send DIC panel 


PT/OT - wait for now, 


Placement: will address after hemodynamically improved


Wound: continue wound protection with santyl, Vashe, daily, cover with occlusive

dressings away from fecal stream as much as possible, consider rectal tube

















Physician Review: Patient Assessed, Agree with Above Assessment and Plan

## 2023-05-17 NOTE — ECHO
HEIGHT: 4 ft 11 in   WEIGHT: 257 lb 15.053 oz   DATE OF STUDY: 5/17/23   REFER DR: 
Dandre Johnson MD

2-DIMENSIONAL: YES

     M.MODE: YES

 DOPPLER: YES

COLOR FLOW: YES



                    TDS:  NO

PORTABLE: YES

 DEFINITY:  NO

BUBBLE STUDY: NO





DIAGNOSIS:  TACHYCARDIA



CARDIAC HISTORY:  

CATHERIZATION: 

SURGERY: 

PROSTHETIC VALVE: 

PACEMAKER: 





MEASUREMENTS (cm)

    DIASTOLIC (NORMALS)                 SYSTOLIC (NORMALS)

IVSd                 1.0 (0.6-1.2)                    LA Diam 3.4 (1.9-4.0)     LVEF       
  85%  

LVIDd               3.2 (3.5-5.7)                        LVIDs      1.5 (2.0-3.5)     %FS  
        53%

LVPWd             1.0 (0.6-1.2)

Ao Diam           2.8 (2.0-3.7)



2 DIMENSIONAL ASSESSMENT:

RIGHT ATRIUM:                   NORMAL

LEFT ATRIUM:       NORMAL



RIGHT VENTRICLE:            NORMAL

LEFT VENTRICLE: NORMAL



TRICUSPID VALVE:             NORMAL

MITRAL VALVE:     NORMAL



PULMONIC VALVE:             NORMAL

AORTIC VALVE:     NORMAL



PERICARDIAL EFFUSION: NONE

AORTIC ROOT:      NORMAL





LEFT VENTRICULAR WALL MOTION:     NORMAL.



DOPPLER/COLOR FLOW:     MILD TRICUSPID REGURGITATION.



COMMENTS:      MILD TRICUSPID REGURGITATION

        NORMAL LEFT VENTRICULAR SIZE AND FUNCTION

        NO EFFUSION



TECHNOLOGIST:   CHAUNCEY VASQUEZ

## 2023-05-17 NOTE — RAD REPORT
EXAM DESCRIPTION:  RAD - Chest Single View - 5/17/2023 7:23 am

 

CLINICAL HISTORY:  vent/intubated

Chest pain.

 

COMPARISON:  Chest Single View dated 5/16/2023; Chest Single View dated 5/9/2023; Chest Single View d
ated 5/3/2023; Chest Single View dated 1/23/2023

 

FINDINGS:  Portable technique limits examination quality.

 

Right-sided PICC line has tip in the SVC. Endotracheal tube tip is at the level of the aortic arch. E
nteric tube descends in the stomach. Bilateral pulmonary opacities are noted likely atelectasis, over
all lungs are underinflated.

## 2023-05-17 NOTE — P.CNS
Date of Consult: 05/17/23


Primary Care Provider: LIDA


Chief Complaint: Shock, respiratory failure


History of Present Illness: 





Patient is 37 years of age with history of spina bifida apparently developed 

hemodynamic collapse perioperative will obtain to shock respiratory failure was 

intubated started on vasopressors transferred to the ICU he was started on 2 

vasopressors including IV fluids currently on a ventilator dexmedetomidine drip 

oliguric


Allergies





levofloxacin [From Levaquin] Allergy (Intermediate, Verified 01/24/23 22:50)


   Hives


morphine Allergy (Intermediate, Verified 01/24/23 22:50)


   Hives


sulfamethoxazole [From Bactrim] Allergy (Intermediate, Verified 01/24/23 22:50)


   Hives


ketorolac tromethamine [From Toradol] Allergy (Verified 01/24/23 22:50)


   Nausea/Vomiting


Penicillins Allergy (Verified 01/24/23 22:50)


   Hives/Rash


vancomycin Allergy (Verified 01/24/23 22:50)


   Hives


ondansetron [From Zofran (as hydrochloride)] Adverse Reaction (Mild, Verified 

01/24/23 22:50)


   Nausea/Vomiting


amoxicillin [From Augmentin] Adverse Reaction (Verified 01/24/23 22:50)


   Hives/Rash


ciprofloxacin Adverse Reaction (Verified 01/24/23 22:50)


   Nausea/Vomiting


doxycycline Adverse Reaction (Verified 01/24/23 22:50)


   Nausea/Vomiting


sesame seed Adverse Reaction (Verified 01/24/23 22:50)


   diarrhea


pop corn Adverse Reaction (Severe, Uncoded 01/24/23 22:50)


   diarrhea





Home Medications: 








Hydromorphone [Dilaudid] 4 mg PO Q8HP PRN 03/14/22 


Lisinopril [Zestril] 2.5 mg PO DAILY 01/24/23 


Insulin Glargine,Hum.rec.anlog [Semglee] 45 unit SQ DAILY 01/30/23 








- Past Medical/Surgical History


Diabetic: Yes


-: Spina Bifida with hydrocephalus


-: Depression


-: Insomnia


-: suprapubic catheter


-: GERD


-: Chronic pain syndrome


-: Muscle wasting


-: Paraplegia


-: Shunt revision


-: Cholecystectomy


-: Foot surgery


-: Hip sx BL


-: Bilateral hip surgery


-: Appendectomy


Psychosocial/ Personal History: Patient currently single.  He has no children.  

He is disabled.





- Family History


  ** Father


Medical History: Hypertension





  ** Mother


Medical History: Hypertension





- Social History


Smoking Status: Unknown if ever smoked


Alcohol use: Yes


CD- Drugs: No


Caffeine use: No


Place of Residence: Home





Review of Systems


is unable to be obtained





Physical Examination














Temp Pulse Resp BP Pulse Ox


 


 99.8 F   117 H  26 H  98/54 L  100 


 


 05/17/23 07:56  05/17/23 07:56  05/17/23 07:56  05/17/23 07:56  05/17/23 07:56








General: Unresponsive





- Problems


(1) Shock


Current Visit: Yes   Status: Acute   


Plan: 


Patient is 37 years of age admitted with perioperative shock respiratory failure

 is currently more stable we will try to wean off his vasopressors oxygenation 

is also improved chest x-ray shows some atelectasis doubt pulmonary embolism 

echocardiogram is pending patient was given 1 dose of hydrocortisone yesterday I

 suspect that is why his white count is elevated sepsis is always a possibility 

patient was started on meropenem he is allergic to vancomycin we will try him on

 daptomycin until cultures come back probably delusional anemia we will repeat 

his hemoglobin around noon Labs reviewed renal function is mildly abnormal I 

suspect is due to the vasopressors continue to monitor EKG no acute changes no 

evidence of pulmonary edema transfuse 1 unit of packed red blood cells surgical 

anemia hemoglobin less than 8 continue with IV fluids until weaned off the 

vasopressors

## 2023-05-18 LAB
BUN BLD-MCNC: 21 MG/DL (ref 7–18)
GLUCOSE SERPLBLD-MCNC: 198 MG/DL (ref 74–106)
HCT VFR BLD CALC: 23.4 % (ref 39.6–49)
HCT VFR BLD CALC: 24.2 % (ref 39.6–49)
HCT VFR BLD CALC: 26 % (ref 39.6–49)
HCT VFR BLD CALC: 26.2 % (ref 39.6–49)
LYMPHOCYTES # SPEC AUTO: 1.9 K/UL (ref 0.7–4.9)
LYMPHOCYTES # SPEC AUTO: 2 K/UL (ref 0.7–4.9)
MAGNESIUM SERPL-MCNC: 1.9 MG/DL (ref 1.6–2.4)
MCV RBC: 89 FL (ref 80–100)
MCV RBC: 89.5 FL (ref 80–100)
NT-PROBNP SERPL-MCNC: 405 PG/ML (ref ?–125)
PMV BLD: 7.1 FL (ref 7.6–11.3)
PMV BLD: 7.1 FL (ref 7.6–11.3)
POTASSIUM SERPL-SCNC: 4.6 MEQ/L (ref 3.5–5.1)
RBC # BLD: 2.63 M/UL (ref 4.33–5.43)
RBC # BLD: 2.71 M/UL (ref 4.33–5.43)

## 2023-05-18 RX ADMIN — HYDROMORPHONE HYDROCHLORIDE PRN MG: 2 INJECTION INTRAMUSCULAR; INTRAVENOUS; SUBCUTANEOUS at 10:52

## 2023-05-18 RX ADMIN — DAPTOMYCIN SCH MLS: 500 INJECTION, POWDER, LYOPHILIZED, FOR SOLUTION INTRAVENOUS at 09:27

## 2023-05-18 RX ADMIN — HYDROMORPHONE HYDROCHLORIDE PRN MG: 1 INJECTION, SOLUTION INTRAMUSCULAR; INTRAVENOUS; SUBCUTANEOUS at 17:57

## 2023-05-18 RX ADMIN — Medication SCH ML: at 20:57

## 2023-05-18 RX ADMIN — SODIUM CHLORIDE SCH MLS: 9 INJECTION, SOLUTION INTRAVENOUS at 07:48

## 2023-05-18 RX ADMIN — HYDROMORPHONE HYDROCHLORIDE PRN MG: 1 INJECTION, SOLUTION INTRAMUSCULAR; INTRAVENOUS; SUBCUTANEOUS at 12:53

## 2023-05-18 RX ADMIN — Medication SCH: at 20:29

## 2023-05-18 RX ADMIN — HUMAN INSULIN SCH UNIT: 100 INJECTION, SOLUTION SUBCUTANEOUS at 12:54

## 2023-05-18 RX ADMIN — HYDROMORPHONE HYDROCHLORIDE PRN MG: 2 INJECTION INTRAMUSCULAR; INTRAVENOUS; SUBCUTANEOUS at 06:40

## 2023-05-18 RX ADMIN — MUPIROCIN SCH APPL: 20 OINTMENT TOPICAL at 07:49

## 2023-05-18 RX ADMIN — HYDROMORPHONE HYDROCHLORIDE PRN MG: 2 INJECTION INTRAMUSCULAR; INTRAVENOUS; SUBCUTANEOUS at 01:30

## 2023-05-18 RX ADMIN — Medication SCH ML: at 07:48

## 2023-05-18 RX ADMIN — NOREPINEPHRINE BITARTRATE SCH MLS/HR: 4 INJECTION, SOLUTION INTRAVENOUS at 01:19

## 2023-05-18 RX ADMIN — HUMAN INSULIN SCH: 100 INJECTION, SOLUTION SUBCUTANEOUS at 16:13

## 2023-05-18 RX ADMIN — INSULIN GLARGINE SCH UNIT: 100 INJECTION, SOLUTION SUBCUTANEOUS at 07:47

## 2023-05-18 RX ADMIN — SODIUM CHLORIDE SCH MLS/HR: 0.9 INJECTION, SOLUTION INTRAVENOUS at 01:19

## 2023-05-18 RX ADMIN — SODIUM CHLORIDE SCH MLS: 9 INJECTION, SOLUTION INTRAVENOUS at 16:08

## 2023-05-18 RX ADMIN — SODIUM CHLORIDE SCH MLS: 0.9 INJECTION, SOLUTION INTRAVENOUS at 20:55

## 2023-05-18 RX ADMIN — HYDROMORPHONE HYDROCHLORIDE PRN MG: 1 INJECTION, SOLUTION INTRAMUSCULAR; INTRAVENOUS; SUBCUTANEOUS at 08:47

## 2023-05-18 RX ADMIN — HUMAN INSULIN SCH UNIT: 100 INJECTION, SOLUTION SUBCUTANEOUS at 07:47

## 2023-05-18 RX ADMIN — HUMAN INSULIN SCH: 100 INJECTION, SOLUTION SUBCUTANEOUS at 20:58

## 2023-05-18 RX ADMIN — Medication SCH APPL: at 07:48

## 2023-05-18 RX ADMIN — SODIUM CHLORIDE SCH MLS/HR: 0.9 INJECTION, SOLUTION INTRAVENOUS at 07:44

## 2023-05-18 RX ADMIN — HYDROMORPHONE HYDROCHLORIDE PRN MG: 1 INJECTION, SOLUTION INTRAMUSCULAR; INTRAVENOUS; SUBCUTANEOUS at 21:58

## 2023-05-18 RX ADMIN — COLLAGENASE SANTYL SCH APPL: 250 OINTMENT TOPICAL at 07:48

## 2023-05-18 RX ADMIN — SODIUM CHLORIDE SCH MLS: 9 INJECTION, SOLUTION INTRAVENOUS at 01:19

## 2023-05-18 RX ADMIN — MUPIROCIN SCH APPL: 20 OINTMENT TOPICAL at 20:56

## 2023-05-18 RX ADMIN — Medication SCH: at 07:49

## 2023-05-18 RX ADMIN — ENOXAPARIN SODIUM SCH MG: 40 INJECTION SUBCUTANEOUS at 20:56

## 2023-05-18 NOTE — RAD REPORT
EXAM DESCRIPTION:  [facility] Single View5/18/2023 4:44 am

 

CLINICAL HISTORY:  Ventilation/intubation

 

COMPARISON:  May 17, 2023

 

FINDINGS:  Lines and tubes in good position.

 

Mild bibasilar atelectasis minimally improved

 

Heart is normal size

## 2023-05-18 NOTE — P.PN
Subjective


Date of Service: 05/18/23


Primary Care Provider: LIDA


Chief Complaint: Respiratory failure





Patient remains intubated and awake, alert, non-verbal conversations











Physical Examination





- Vital Signs


Temperature: 97.4 F


Blood Pressure: 138/76


Pulse: 115


Respirations: 20


Pulse Ox (%): 99





- Physical Exam


General: Alert, In no apparent distress, Cooperative


HEENT: Mucous membr. moist/pink


Neck: Supple


Respiratory: Clear to auscultation bilaterally, Normal air movement


Cardiovascular: Regular rate/rhythm


Capillary refill: <2 Seconds


Gastrointestinal: Other (soft, mild appropriate TTP, ND, blood staining on 

dressings from yesterday, wound irrigated, completely clear today, no blood 

tinge, wound repacked, staples inplace)


Musculoskeletal: No clubbing, Swelling


Integumentary: Other (wounds stable, )





- Studies


Medications List Reviewed: Yes





Assessment And Plan





- Current Problems (Diagnosis)


(1) S/P exploratory laparotomy


Current Visit: Yes   Status: Acute   


Plan: 











Patient is a 37 year old man s/p Exploratory Laparotomy, Adhesiolysis with 

primary closure on 5-





Gen/ Neuro: + pain medication Rx currently given - dilaudid, patient has become 

responsive, asking for pain medication, has been given dilaudid PRN


CVS: sinus tachycardia now resolved,  hypotensive on levophed remains,  

phenylepherine has been weaned off.  Dr. Stringer Consulted, improved tachycardia 

from last evening. echo noted, no strain pattern, EF>80 


Pulm: ventilator dependent, oxygenating well,  continue vent management, wean 

PRN, recommend CT PE protocol to rule out pulmonary emboli, ultrasound of 

bilateral lower extremities are limited but no obvious DVT noted, Dr. Bacon 

consulted, chest x ray - mild atelectasis, daily chest x ray.


GI: serial exams, no blood on dressings, clean


FEN: fluid boluses with LR to continue PRN, Electrolyte replacement protocol for

Mg, Phos, K, nutrition- will hold on nutrition for now while unstable, continue 

IV fluid warmer, hot air blanket ([employer]ger) PRN


Endo: hyperglycemia - insulin GGT has been weaned off 


Renal: monitor urine output via both suprapubic and contreras catheters, creatinine 

improved, urine output has increased


ID: Merropenem, patient was given 1 dose of Rocephin preoperatively, started 

daptomycin 


Prophylaxis: Lovenox, will consider PPI if not able to wean off vent 


Tubes / Lines: PICC line, LEFT femoral central line, RIGHT arterial line, 

suprapubic catheter, contreras catheter, NG tube, ET tube, rectal temp probe


Heme: Hbg dropped today, likely mostly dilutional as he is positive fluid status

after surgery, patient recieved transfusion, 


PT/OT - wait for now, 


Placement: will address after hemodynamically improved


Wound: continue wound protection with santyl, Vashe, daily, cover with occlusive

dressings away from fecal stream as much as possible, consider rectal tube

















Physician Review: Patient Assessed, Agree with Above Assessment and Plan

## 2023-05-18 NOTE — P.PN
Subjective


Date of Service: 05/18/23


Primary Care Provider: LIDA


Chief Complaint: hyperglycemia 


Subjective: Improving (weaning down off pressors and sedation)





Review of Systems


is unable to be obtained





Physical Examination





- Vital Signs


Temperature: 97.8 F


Blood Pressure: 127/74


Pulse: 85


Respirations: 20


Pulse Ox (%): 99





- Physical Exam


General: Alert, In no apparent distress


HEENT: Atraumatic, PERRLA, EOMI


Neck: Supple, JVD not distended


Respiratory: Clear to auscultation bilaterally, Normal air movement


Cardiovascular: Regular rate/rhythm, Normal S1 S2


Gastrointestinal: Normal bowel sounds, No tenderness


Musculoskeletal: No tenderness


Integumentary: No rashes


Neurological: Normal speech, Normal tone, Normal affect


Lymphatics: No axilla or inguinal lymphadenopathy





- Studies


Medications List Reviewed: Yes





Assessment And Plan





- Current Problems (Diagnosis)


(1) Shock circulatory


Current Visit: Yes   Status: Acute   


Plan: 


surgery not completed. Vented, on pressors.  Will work on getting his blood 

pressure stablized and the getting him off the vent 








(2) Pressure ulcer of unspecified site, unspecified stage


Current Visit: Yes   Status: Chronic   


Plan: 


Patient seen by Dr Gordon and Dr. Johnson.  No need for scrotal debridgment.  

Dr Johnson would like to do the sacral wound.  However would like to do a 

colectomy.  this is in the interest of letting the wound heal without chronic 

fecal contamination.   Redd has a long history of difficulty with his hygiene 

in this regard  


5.15 plans for colectomy tomorrow. 


Qualifiers: 


   Pressure injury location: ankle   Pressure injury stage: stage 2   

Laterality: unspecified laterality   Qualified Code(s): L89.502 - Pressure ulcer

of unspecified ankle, stage 2   





(3) Hyperosmolar non-ketotic state due to type 2 diabetes mellitus


Current Visit: No   Status: Acute   


Plan: 


due to non compliance with insulin.  I had a talk with him this morning.   He 

may need therapy.  He has a  long history of non compliance.  This can be 

frustrating to his providers.  More importantly this behavior is most likely 

shortening his life expectancy.  





His sugars this morning are in the 150's   Stop the insulin drip.   Start him on

sq insulin and start feeding him 








(4) Chronic suprapubic catheter


Current Visit: Yes   Status: Chronic   


Plan: 


Is leaking.  Will consult Dr. Gordon 








(5) Chronic pain disorder


Onset Date: 11/03/17   Current Visit: No   Status: Chronic   


Plan: 


restart his hydromorphine 








(6) Spina bifida


Onset Date: 11/03/17   Current Visit: No   Status: Chronic   


Plan: 


Wheelchair bound 


Qualifiers: 


   Spinal region: lumbar 





(7) Depression


Current Visit: Yes   Status: Chronic   


Plan: 


Patient has been having a long history of non compliance with his medication.  

Discussed going to a nursing facility to improve his quality of care.  He is 

very willing. The patient has been had a history of depression.  1 suicide 

attempt in the past.  He is not actively suicidal.  Will start him an 

antidepressant and look for a long term councillor for the patient.  I need 

these reminders that life has been very unfair to Redd.  He acts out at times 

due to this.


Qualifiers: 


   Depression Type: major depressive disorder   Major depression recurrence: 

recurrent   Active/Remission status: currently active   Psychotic features: 

without psychotic features 





(8) Anemia


Current Visit: Yes   Status: Acute   


Plan: 


will transfuse 2 more units of blood  check for occult blood from a GI source. 


Qualifiers: 


   Anemia type: unspecified type   Qualified Code(s): D64.9 - Anemia, 

unspecified   


Discharge Plan: Home


Plan to discharge in: Greater than 2 days





- Code Status/Comfort Care


Code Status Assessed: No


Physician Review: Patient Assessed, Agree with Above Assessment and Plan


Critical Care: Yes


Time Spent Managing PTS Care (In Minutes): 20

## 2023-05-18 NOTE — EKG
Test Date:    2023-05-16               Test Time:    16:07:51

Technician:   JOSE                                     

                                                     

MEASUREMENT RESULTS:                                       

Intervals:                                           

Rate:         151                                    

WI:           122                                    

QRSD:         88                                     

QT:           342                                    

QTc:          542                                    

Axis:                                                

P:            48                                     

WI:           122                                    

QRS:          47                                     

T:            48                                     

                                                     

INTERPRETIVE STATEMENTS:                                       

                                                     

Sinus tachycardia

Nonspecific ST abnormality

Abnormal ECG

Compared to ECG 05/03/2023 16:02:17

ST (T wave) deviation now present

Sinus rhythm no longer present

Myocardial infarct finding no longer present



Electronically Signed On 05-18-23 11:20:34 CDT by Cheng Anglin

## 2023-05-18 NOTE — P.PN
Subjective


Date of Service: 05/18/23


Primary Care Provider: LIDA


Chief Complaint: Respiratory failure


Subjective: Improving (Patient is improving been weaned off vasopressors he is 

alert responsive cooperative)





Review of Systems


is unable to be obtained





Physical Examination





- Vital Signs


Temperature: 97.8 F


Blood Pressure: 116/59


Pulse: 84


Respirations: 20


Pulse Ox (%): 98





- Physical Exam


General: Alert, Cooperative


Respiratory: Clear to auscultation bilaterally


Cardiovascular: No edema, Normal S1 S2





- Studies


Medications List Reviewed: Yes





Assessment And Plan





- Current Problems (Diagnosis)


(1) Shock


Current Visit: Yes   Status: Acute   


Plan: 


Patient admitted with shock respiratory failure he is now recovering very well 

plan to wean off the vasopressors and the [facility] chemistries reviewed is 

mildly anemic white count is declining and to wean off and extubate today once 

she is off vasopressors Labs reviewed chest x-ray shows diminished lung volumes 

otherwise clear patient is on minimal oxygen





Physician Review: Patient Assessed, Agree with Above Assessment and Plan

## 2023-05-19 LAB
BUN BLD-MCNC: 12 MG/DL (ref 7–18)
GLUCOSE SERPLBLD-MCNC: 92 MG/DL (ref 74–106)
HCT VFR BLD CALC: 23.3 % (ref 39.6–49)
LYMPHOCYTES # SPEC AUTO: 2.1 K/UL (ref 0.7–4.9)
MAGNESIUM SERPL-MCNC: 1.8 MG/DL (ref 1.6–2.4)
MCV RBC: 88.9 FL (ref 80–100)
NT-PROBNP SERPL-MCNC: 1031 PG/ML (ref ?–125)
PMV BLD: 7.1 FL (ref 7.6–11.3)
POTASSIUM SERPL-SCNC: 4 MEQ/L (ref 3.5–5.1)
RBC # BLD: 2.62 M/UL (ref 4.33–5.43)

## 2023-05-19 RX ADMIN — SODIUM CHLORIDE SCH: 0.9 INJECTION, SOLUTION INTRAVENOUS at 07:00

## 2023-05-19 RX ADMIN — HYDROMORPHONE HYDROCHLORIDE PRN MG: 2 INJECTION INTRAMUSCULAR; INTRAVENOUS; SUBCUTANEOUS at 22:39

## 2023-05-19 RX ADMIN — HYDROMORPHONE HYDROCHLORIDE PRN MG: 1 INJECTION, SOLUTION INTRAMUSCULAR; INTRAVENOUS; SUBCUTANEOUS at 02:01

## 2023-05-19 RX ADMIN — HYDROMORPHONE HYDROCHLORIDE PRN MG: 2 INJECTION INTRAMUSCULAR; INTRAVENOUS; SUBCUTANEOUS at 19:50

## 2023-05-19 RX ADMIN — HYDROMORPHONE HYDROCHLORIDE PRN MG: 2 INJECTION INTRAMUSCULAR; INTRAVENOUS; SUBCUTANEOUS at 12:30

## 2023-05-19 RX ADMIN — MUPIROCIN SCH: 20 OINTMENT TOPICAL at 09:00

## 2023-05-19 RX ADMIN — PROMETHAZINE HYDROCHLORIDE PRN MG: 25 INJECTION INTRAMUSCULAR; INTRAVENOUS at 19:48

## 2023-05-19 RX ADMIN — Medication SCH: at 19:59

## 2023-05-19 RX ADMIN — Medication SCH ML: at 19:59

## 2023-05-19 RX ADMIN — SODIUM CHLORIDE SCH MLS: 9 INJECTION, SOLUTION INTRAVENOUS at 16:20

## 2023-05-19 RX ADMIN — HUMAN INSULIN SCH: 100 INJECTION, SOLUTION SUBCUTANEOUS at 07:30

## 2023-05-19 RX ADMIN — MUPIROCIN SCH APPL: 20 OINTMENT TOPICAL at 21:50

## 2023-05-19 RX ADMIN — SODIUM CHLORIDE SCH MLS: 9 INJECTION, SOLUTION INTRAVENOUS at 08:21

## 2023-05-19 RX ADMIN — SODIUM CHLORIDE SCH MG: 0.9 INJECTION, SOLUTION INTRAVENOUS at 08:21

## 2023-05-19 RX ADMIN — HYDROMORPHONE HYDROCHLORIDE PRN MG: 2 INJECTION INTRAMUSCULAR; INTRAVENOUS; SUBCUTANEOUS at 16:20

## 2023-05-19 RX ADMIN — Medication SCH APPL: at 21:49

## 2023-05-19 RX ADMIN — HUMAN INSULIN SCH: 100 INJECTION, SOLUTION SUBCUTANEOUS at 20:57

## 2023-05-19 RX ADMIN — HUMAN INSULIN SCH: 100 INJECTION, SOLUTION SUBCUTANEOUS at 11:30

## 2023-05-19 RX ADMIN — Medication SCH: at 08:22

## 2023-05-19 RX ADMIN — HUMAN INSULIN SCH: 100 INJECTION, SOLUTION SUBCUTANEOUS at 16:09

## 2023-05-19 RX ADMIN — SODIUM CHLORIDE SCH MLS: 9 INJECTION, SOLUTION INTRAVENOUS at 00:50

## 2023-05-19 RX ADMIN — Medication SCH ML: at 08:23

## 2023-05-19 RX ADMIN — HYDROMORPHONE HYDROCHLORIDE PRN MG: 1 INJECTION, SOLUTION INTRAMUSCULAR; INTRAVENOUS; SUBCUTANEOUS at 05:59

## 2023-05-19 RX ADMIN — INSULIN GLARGINE SCH: 100 INJECTION, SOLUTION SUBCUTANEOUS at 09:00

## 2023-05-19 RX ADMIN — PROMETHAZINE HYDROCHLORIDE PRN MG: 25 INJECTION INTRAMUSCULAR; INTRAVENOUS at 08:22

## 2023-05-19 RX ADMIN — ENOXAPARIN SODIUM SCH MG: 40 INJECTION SUBCUTANEOUS at 19:58

## 2023-05-19 RX ADMIN — COLLAGENASE SANTYL SCH APPL: 250 OINTMENT TOPICAL at 21:48

## 2023-05-19 NOTE — RAD REPORT
EXAM DESCRIPTION:  CT - Chest For Pe Angio - 5/19/2023 10:27 am

 

CLINICAL HISTORY:  r/o PE

 

COMPARISON:  Chest Abd Pelvis Wo Con dated 12/14/2022; Chest For Pe Angio dated 5/15/2018; CT CHEST A
BD PELVIS WO CONT dated 10/21/2015

 

TECHNIQUE:  Thin axial CT images of the chest were obtained following administration of 100 mL Isovue
 370 IV contrast. Multiplanar reconstructions, and maximum intensity projection reconstructions were 
generated and reviewed. Exam utilizes a protocol for optimal evaluation of pulmonary arterial tree.

 

All CT scans are performed using dose optimization technique as appropriate and may include automated
 exposure control or mA/KV adjustment according to patient size.

 

FINDINGS:  Suboptimal diagnostic quality due to extensive beam hardening artifact in the mid to lower
 lungs related to body habitus and arm positioning. Pulmonary arteries are unremarkable allowing for 
these limitations. No central emboli or other suspicious finding, although evaluation of the peripher
al pulmonary arterial circulation is markedly limited. No acute or significant aorta findings.

 

Central vascular engorgement. Small bilateral pleural effusions more so on the right. Patchy central 
and dependent airspace opacities. No pleural thickening or pleural effusion. No pneumothorax.

 

No abnormal mediastinal or hilar masses or lymphadenopathy seen. No chest wall mass or abnormal axill
iary lymphadenopathy.

 

 

IMPRESSION:  Technically limited exam as discussed above. No findings to suggest acute central pulmon
maryanne emboli within these limitations.

 

Central congestive changes with dependent opacities and small effusions. Findings may relate to pulmo
nary edema.

## 2023-05-19 NOTE — RAD REPORT
EXAM DESCRIPTION:  [facility] Single View5/19/2023 6:28 am

 

CLINICAL HISTORY:  vent/intubated

 

COMPARISON:  Chest Single View dated 5/18/2023; Chest Single View dated 5/17/2023; Chest Single View 
dated 5/16/2023; Chest Single View dated 5/9/2023

 

TECHNIQUE:   Portable AP view of the chest.

 

FINDINGS:  Ventricular shunt catheter courses obliquely across the chest wall and left upper abdomen.
 Other calcified shunt catheter tracts again seen.

 

Decreased inspiratory effort limits evaluation. Progressive patchy perihilar opacities, as well as pe
ripheral opacities in the left mid to lower lung. Central interstitial prominence. . No pneumothorax.
 There may be a small left pleural effusion. The cardiomediastinal contours are unremarkable.

 

IMPRESSION:  Progressive central and left basilar opacities, could reflect airspace opacities or atel
ectasis with superimposed pulmonary edema.

## 2023-05-19 NOTE — P.PN
Subjective


Date of Service: 05/19/23


Primary Care Provider: LIDA


Chief Complaint: Respiratory failure





Patient extubated, only complaints of pain, conversive, oriented.


No acute events, had a 2 second sinus pause which was asymptomatic, has not 

recurred. off all pressors.














Physical Examination





- Vital Signs


Temperature: 98.2 F


Blood Pressure: 154/91


Pulse: 113


Respirations: 23


Pulse Ox (%): 96





- Physical Exam


General: Alert, In no apparent distress, Cooperative


HEENT: Mucous membr. moist/pink


Neck: Supple, Without JVD or thyroid abnormality


Respiratory: Diminished


Cardiovascular: Other (tachycardia, otherwise normal)


Gastrointestinal: Other (soft, mild appropriate TTP, ND, wound @ midline with 

minimal serous drainage, no bleeding, staples in place.)


Musculoskeletal: Swelling (improved, anasarca)


Integumentary: Pressure ulcer (perineal wounds stable), Other


Neurological: Normal speech





- Studies


Medications List Reviewed: Yes





Assessment And Plan





- Current Problems (Diagnosis)


(1) S/P exploratory laparotomy


Current Visit: Yes   Status: Acute   


Plan: 











Patient is a 37 year old man s/p Exploratory Laparotomy, Adhesiolysis with 

primary closure on 5-





Gen/ Neuro: + pain medication Rx currently given - dilaudid, patient is 

extubated, asking for pain medication, has been given dilaudid PRN will modify 

dose


CVS: sinus tachycardia now recurred, possible due to pain,  hypotension has 

resolved, off all pressors, Dr. Stringer Consulted,  echo noted, no strain 

pattern, EF>80 


Pulm: Extubated, oxygenating well,   CT PE protocol noted, no obvious PE, but 

limited study, ultrasound of bilateral lower extremities are limited but no 

obvious DVT noted, Dr. Bacon consulted, chest x ray - mild atelectasis, daily

chest x ray.


GI: serial exams, no blood on dressings, clean


FEN:  Electrolyte replacement protocol for Mg, Phos, K, nutrition- patient 

started on PO clears but has no appetite, will start TPN,  hot air blanket 

([employer]) PRN, calorie count.


Endo: hyperglycemia - insulin GGT has been weaned off, anticipate hyperglycemia 

with start of TPN


Renal: monitor urine output via both suprapubic and contreras catheters, creatinine 

improved, urine output has increased


ID: Merropenem, patient was given 1 dose of Rocephin preoperatively, daptomycin 


Prophylaxis: Lovenox, incentive spirometry


Tubes / Lines: PICC line, LEFT femoral central line, RIGHT arterial line, 

suprapubic catheter, contreras catheter,  rectal temp probe


Heme: Hbg dropped today, likely mostly dilutional as he is positive fluid status

after surgery, monitor for now 


PT/OT - consult, 


Placement: will address after hemodynamically improved


Wound: continue wound protection with santyl, Vashe, daily, cover with occlusive

dressings away from fecal stream as much as possible, consider rectal tube

















Physician Review: Patient Assessed, Agree with Above Assessment and Plan

## 2023-05-19 NOTE — P.PN
Subjective


Date of Service: 05/19/23


Primary Care Provider: LIDA


Chief Complaint: Respiratory failure


Subjective: Improving (extubated.  Off pressors)





Review of Systems


10-point ROS is otherwise unremarkable


General: Weakness





Physical Examination





- Vital Signs


Temperature: 98.2 F


Blood Pressure: 154/91


Pulse: 113


Respirations: 25


Pulse Ox (%): 100





- Physical Exam


General: Alert, In no apparent distress


HEENT: Atraumatic, PERRLA, EOMI


Neck: Supple, JVD not distended


Respiratory: Clear to auscultation bilaterally, Normal air movement


Cardiovascular: Regular rate/rhythm, Normal S1 S2


Gastrointestinal: Normal bowel sounds, No tenderness


Musculoskeletal: No tenderness


Integumentary: No rashes


Neurological: Normal speech, Normal tone, Normal affect


Lymphatics: No axilla or inguinal lymphadenopathy





- Studies


Medications List Reviewed: Yes





Assessment And Plan





- Current Problems (Diagnosis)


(1) Pressure ulcer of unspecified site, unspecified stage


Current Visit: Yes   Status: Chronic   


Plan: 


Patient seen by Dr Gordon and Dr. Johnson.  No need for scrotal debridgment.  

Dr Johnson would like to do the sacral wound.  However would like to do a 

colectomy.  this is in the interest of letting the wound heal without chronic 

fecal contamination.   Redd has a long history of difficulty with his hygiene 

in this regard  


5.15 plans for colectomy tomorrow. 


Qualifiers: 


   Pressure injury location: ankle   Pressure injury stage: stage 2   

Laterality: unspecified laterality   Qualified Code(s): L89.502 - Pressure ulcer

of unspecified ankle, stage 2   





(2) Hyperosmolar non-ketotic state due to type 2 diabetes mellitus


Current Visit: No   Status: Acute   


Plan: 


due to non compliance with insulin.  I had a talk with him this morning.   He 

may need therapy.  He has a  long history of non compliance.  This can be 

frustrating to his providers.  More importantly this behavior is most likely 

shortening his life expectancy.  





His sugars this morning are in the 150's   Stop the insulin drip.   Start him on

sq insulin and start feeding him 








(3) Chronic suprapubic catheter


Current Visit: Yes   Status: Chronic   


Plan: 


Is leaking.  Will consult Dr. Gordon 








(4) Chronic pain disorder


Onset Date: 11/03/17   Current Visit: No   Status: Chronic   


Plan: 


restart his hydromorphine 








(5) Spina bifida


Onset Date: 11/03/17   Current Visit: No   Status: Chronic   


Plan: 


Wheelchair bound 


Qualifiers: 


   Spinal region: lumbar 





(6) Depression


Current Visit: Yes   Status: Chronic   


Plan: 


Patient has been having a long history of non compliance with his medication.  

Discussed going to a nursing facility to improve his quality of care.  He is 

very willing. The patient has been had a history of depression.  1 suicide 

attempt in the past.  He is not actively suicidal.  Will start him an a

ntidepressant and look for a long term councillor for the patient.  I need these

reminders that life has been very unfair to Redd.  He acts out at times due to

this.


Qualifiers: 


   Depression Type: major depressive disorder   Major depression recurrence: 

recurrent   Active/Remission status: currently active   Psychotic features: 

without psychotic features 





(7) Anemia


Current Visit: Yes   Status: Acute   


Plan: 


will transfuse 2 more units of blood  check for occult blood from a GI source. 


Qualifiers: 


   Anemia type: unspecified type   Qualified Code(s): D64.9 - Anemia, 

unspecified   


Discharge Plan: Home


Plan to discharge in: 24 Hours





- Code Status/Comfort Care


Code Status Assessed: No


Code Status: Full Code


Physician Review: Patient Assessed, Agree with Above Assessment and Plan


Critical Care: Yes


Time Spent Managing PTS Care (In Minutes): 30

## 2023-05-20 LAB
BUN BLD-MCNC: 8 MG/DL (ref 7–18)
GLUCOSE SERPLBLD-MCNC: 131 MG/DL (ref 74–106)
HCT VFR BLD CALC: 24.6 % (ref 39.6–49)
LYMPHOCYTES # SPEC AUTO: 2.5 K/UL (ref 0.7–4.9)
MAGNESIUM SERPL-MCNC: 1.8 MG/DL (ref 1.6–2.4)
MCV RBC: 90.5 FL (ref 80–100)
NT-PROBNP SERPL-MCNC: 1272 PG/ML (ref ?–125)
PMV BLD: 6.9 FL (ref 7.6–11.3)
POTASSIUM SERPL-SCNC: 4 MEQ/L (ref 3.5–5.1)
RBC # BLD: 2.72 M/UL (ref 4.33–5.43)

## 2023-05-20 RX ADMIN — SODIUM CHLORIDE SCH MLS: 9 INJECTION, SOLUTION INTRAVENOUS at 16:47

## 2023-05-20 RX ADMIN — Medication SCH ML: at 20:29

## 2023-05-20 RX ADMIN — HYDROMORPHONE HYDROCHLORIDE PRN MG: 2 INJECTION INTRAMUSCULAR; INTRAVENOUS; SUBCUTANEOUS at 21:02

## 2023-05-20 RX ADMIN — HUMAN INSULIN SCH: 100 INJECTION, SOLUTION SUBCUTANEOUS at 07:30

## 2023-05-20 RX ADMIN — PROMETHAZINE HYDROCHLORIDE PRN MG: 25 INJECTION INTRAMUSCULAR; INTRAVENOUS at 18:11

## 2023-05-20 RX ADMIN — HYDROMORPHONE HYDROCHLORIDE PRN MG: 2 INJECTION INTRAMUSCULAR; INTRAVENOUS; SUBCUTANEOUS at 07:04

## 2023-05-20 RX ADMIN — HUMAN INSULIN SCH UNIT: 100 INJECTION, SOLUTION SUBCUTANEOUS at 11:24

## 2023-05-20 RX ADMIN — PROMETHAZINE HYDROCHLORIDE PRN MG: 25 INJECTION INTRAMUSCULAR; INTRAVENOUS at 07:02

## 2023-05-20 RX ADMIN — PROMETHAZINE HYDROCHLORIDE PRN MG: 25 INJECTION INTRAMUSCULAR; INTRAVENOUS at 01:07

## 2023-05-20 RX ADMIN — MUPIROCIN SCH APPL: 20 OINTMENT TOPICAL at 20:47

## 2023-05-20 RX ADMIN — HUMAN INSULIN SCH: 100 INJECTION, SOLUTION SUBCUTANEOUS at 19:58

## 2023-05-20 RX ADMIN — SODIUM CHLORIDE SCH MLS: 9 INJECTION, SOLUTION INTRAVENOUS at 00:48

## 2023-05-20 RX ADMIN — MUPIROCIN SCH APPL: 20 OINTMENT TOPICAL at 09:45

## 2023-05-20 RX ADMIN — Medication SCH: at 08:10

## 2023-05-20 RX ADMIN — HYDROMORPHONE HYDROCHLORIDE PRN MG: 2 INJECTION INTRAMUSCULAR; INTRAVENOUS; SUBCUTANEOUS at 04:26

## 2023-05-20 RX ADMIN — Medication SCH APPL: at 10:00

## 2023-05-20 RX ADMIN — Medication SCH ML: at 08:09

## 2023-05-20 RX ADMIN — INSULIN GLARGINE SCH UNIT: 100 INJECTION, SOLUTION SUBCUTANEOUS at 08:08

## 2023-05-20 RX ADMIN — SODIUM CHLORIDE SCH MG: 0.9 INJECTION, SOLUTION INTRAVENOUS at 08:08

## 2023-05-20 RX ADMIN — HYDROMORPHONE HYDROCHLORIDE PRN MG: 2 INJECTION INTRAMUSCULAR; INTRAVENOUS; SUBCUTANEOUS at 15:17

## 2023-05-20 RX ADMIN — SODIUM CHLORIDE SCH MLS: 9 INJECTION, SOLUTION INTRAVENOUS at 08:08

## 2023-05-20 RX ADMIN — HYDROMORPHONE HYDROCHLORIDE PRN MG: 2 INJECTION INTRAMUSCULAR; INTRAVENOUS; SUBCUTANEOUS at 09:44

## 2023-05-20 RX ADMIN — COLLAGENASE SANTYL SCH APPL: 250 OINTMENT TOPICAL at 10:00

## 2023-05-20 RX ADMIN — HYDROMORPHONE HYDROCHLORIDE PRN MG: 2 INJECTION INTRAMUSCULAR; INTRAVENOUS; SUBCUTANEOUS at 18:11

## 2023-05-20 RX ADMIN — HYDROMORPHONE HYDROCHLORIDE PRN MG: 2 INJECTION INTRAMUSCULAR; INTRAVENOUS; SUBCUTANEOUS at 01:12

## 2023-05-20 RX ADMIN — HYDROMORPHONE HYDROCHLORIDE PRN MG: 2 INJECTION INTRAMUSCULAR; INTRAVENOUS; SUBCUTANEOUS at 12:29

## 2023-05-20 RX ADMIN — PROMETHAZINE HYDROCHLORIDE PRN MG: 25 INJECTION INTRAMUSCULAR; INTRAVENOUS at 12:29

## 2023-05-20 RX ADMIN — SODIUM CHLORIDE SCH MLS: 0.9 INJECTION, SOLUTION INTRAVENOUS at 16:47

## 2023-05-20 RX ADMIN — HUMAN INSULIN SCH: 100 INJECTION, SOLUTION SUBCUTANEOUS at 16:30

## 2023-05-20 RX ADMIN — Medication SCH: at 20:51

## 2023-05-20 RX ADMIN — ENOXAPARIN SODIUM SCH MG: 40 INJECTION SUBCUTANEOUS at 20:29

## 2023-05-20 NOTE — RAD REPORT
EXAM DESCRIPTION:  [facility] Single View5/20/2023 5:45 am

 

CLINICAL HISTORY:  Ventilated/intubated

 

COMPARISON:  May 19th

 

FINDINGS:  Endotracheal tube not visualized within the field of view

 

Mild bilateral pulmonary opacities partially resolved

 

Heart is normal size.  shunt courses the right hemithorax

 

IMPRESSION:  Partial resolution in bilateral pulmonary opacities

## 2023-05-20 NOTE — P.PN
Subjective


Date of Service: 05/20/23


Primary Care Provider: LIDA


Chief Complaint: Respiratory failure


Subjective: No new changes





Review of Systems


10-point ROS is otherwise unremarkable





Physical Examination





- Vital Signs


Temperature: 97.2 F


Blood Pressure: 141/78


Pulse: 114


Respirations: 21


Pulse Ox (%): 92





- Physical Exam


General: Alert, In no apparent distress


HEENT: Atraumatic, PERRLA, EOMI


Neck: Supple, JVD not distended


Respiratory: Clear to auscultation bilaterally, Normal air movement


Cardiovascular: Regular rate/rhythm, Normal S1 S2


Gastrointestinal: Normal bowel sounds, No tenderness


Musculoskeletal: No tenderness


Integumentary: No rashes


Neurological: Normal speech, Normal tone, Normal affect


Lymphatics: No axilla or inguinal lymphadenopathy





- Studies


Medications List Reviewed: Yes





Assessment And Plan





- Current Problems (Diagnosis)


(1) Pressure ulcer of unspecified site, unspecified stage


Current Visit: Yes   Status: Chronic   


Plan: 


Patient seen by Dr Gordon and Dr. Johnson.  No need for scrotal debridgment.  

Dr Johnson would like to do the sacral wound.  However would like to do a 

colectomy.  this is in the interest of letting the wound heal without chronic 

fecal contamination.   Redd has a long history of difficulty with his hygiene 

in this regard  


5.15 plans for colectomy tomorrow. 


Qualifiers: 


   Pressure injury location: ankle   Pressure injury stage: stage 2   

Laterality: unspecified laterality   Qualified Code(s): L89.502 - Pressure ulcer

of unspecified ankle, stage 2   





(2) Hyperosmolar non-ketotic state due to type 2 diabetes mellitus


Current Visit: No   Status: Acute   


Plan: 


due to non compliance with insulin.  I had a talk with him this morning.   He 

may need therapy.  He has a  long history of non compliance.  This can be 

frustrating to his providers.  More importantly this behavior is most likely 

shortening his life expectancy.  





His sugars this morning are in the 150's   Stop the insulin drip.   Start him on

sq insulin and start feeding him 








(3) Chronic suprapubic catheter


Current Visit: Yes   Status: Chronic   


Plan: 


Is leaking.  Will consult Dr. Gordon 








(4) Chronic pain disorder


Onset Date: 11/03/17   Current Visit: No   Status: Chronic   


Plan: 


restart his hydromorphine 








(5) Spina bifida


Onset Date: 11/03/17   Current Visit: No   Status: Chronic   


Plan: 


Wheelchair bound 


Qualifiers: 


   Spinal region: lumbar 





(6) Depression


Current Visit: Yes   Status: Chronic   


Plan: 


Patient has been having a long history of non compliance with his medication.  

Discussed going to a nursing facility to improve his quality of care.  He is 

very willing. The patient has been had a history of depression.  1 suicide 

attempt in the past.  He is not actively suicidal.  Will start him an 

antidepressant and look for a long term councillor for the patient.  I need 

these reminders that life has been very unfair to Redd.  He acts out at times 

due to this.


Qualifiers: 


   Depression Type: major depressive disorder   Major depression recurrence: 

recurrent   Active/Remission status: currently active   Psychotic features: 

without psychotic features 





(7) Anemia


Current Visit: Yes   Status: Acute   


Plan: 


will transfuse 2 more units of blood  check for occult blood from a GI source. 


Qualifiers: 


   Anemia type: unspecified type   Qualified Code(s): D64.9 - Anemia, 

unspecified   


Discharge Plan: Home


Plan to discharge in: 24 Hours





- Code Status/Comfort Care


Code Status Assessed: No


Code Status: Full Code


Physician Review: Patient Assessed, Agree with Above Assessment and Plan


Critical Care: No


Time Spent Managing PTS Care (In Minutes): 20

## 2023-05-21 LAB
BUN BLD-MCNC: 8 MG/DL (ref 7–18)
GLUCOSE SERPLBLD-MCNC: 93 MG/DL (ref 74–106)
HCT VFR BLD CALC: 25.6 % (ref 39.6–49)
LYMPHOCYTES # SPEC AUTO: 2.7 K/UL (ref 0.7–4.9)
MAGNESIUM SERPL-MCNC: 1.8 MG/DL (ref 1.6–2.4)
MCV RBC: 91.9 FL (ref 80–100)
PMV BLD: 7.4 FL (ref 7.6–11.3)
POTASSIUM SERPL-SCNC: 4 MEQ/L (ref 3.5–5.1)
RBC # BLD: 2.79 M/UL (ref 4.33–5.43)

## 2023-05-21 RX ADMIN — HYDROMORPHONE HYDROCHLORIDE PRN MG: 2 INJECTION INTRAMUSCULAR; INTRAVENOUS; SUBCUTANEOUS at 22:33

## 2023-05-21 RX ADMIN — INSULIN GLARGINE SCH: 100 INJECTION, SOLUTION SUBCUTANEOUS at 07:36

## 2023-05-21 RX ADMIN — HYDROMORPHONE HYDROCHLORIDE PRN MG: 2 INJECTION INTRAMUSCULAR; INTRAVENOUS; SUBCUTANEOUS at 11:01

## 2023-05-21 RX ADMIN — Medication SCH ML: at 20:08

## 2023-05-21 RX ADMIN — Medication SCH ML: at 08:14

## 2023-05-21 RX ADMIN — HUMAN INSULIN SCH: 100 INJECTION, SOLUTION SUBCUTANEOUS at 16:05

## 2023-05-21 RX ADMIN — Medication SCH APPL: at 11:00

## 2023-05-21 RX ADMIN — PROMETHAZINE HYDROCHLORIDE PRN MG: 25 INJECTION INTRAMUSCULAR; INTRAVENOUS at 05:30

## 2023-05-21 RX ADMIN — SODIUM CHLORIDE SCH MLS: 9 INJECTION, SOLUTION INTRAVENOUS at 08:13

## 2023-05-21 RX ADMIN — HYDROMORPHONE HYDROCHLORIDE PRN MG: 2 INJECTION INTRAMUSCULAR; INTRAVENOUS; SUBCUTANEOUS at 13:44

## 2023-05-21 RX ADMIN — HUMAN INSULIN SCH: 100 INJECTION, SOLUTION SUBCUTANEOUS at 19:58

## 2023-05-21 RX ADMIN — PROMETHAZINE HYDROCHLORIDE PRN MG: 25 INJECTION INTRAMUSCULAR; INTRAVENOUS at 22:34

## 2023-05-21 RX ADMIN — HYDROMORPHONE HYDROCHLORIDE PRN MG: 2 INJECTION INTRAMUSCULAR; INTRAVENOUS; SUBCUTANEOUS at 08:13

## 2023-05-21 RX ADMIN — ENOXAPARIN SODIUM SCH MG: 40 INJECTION SUBCUTANEOUS at 20:07

## 2023-05-21 RX ADMIN — COLLAGENASE SANTYL SCH APPL: 250 OINTMENT TOPICAL at 11:00

## 2023-05-21 RX ADMIN — HYDROMORPHONE HYDROCHLORIDE PRN MG: 2 INJECTION INTRAMUSCULAR; INTRAVENOUS; SUBCUTANEOUS at 05:30

## 2023-05-21 RX ADMIN — SODIUM CHLORIDE SCH MLS: 9 INJECTION, SOLUTION INTRAVENOUS at 16:32

## 2023-05-21 RX ADMIN — Medication SCH: at 08:14

## 2023-05-21 RX ADMIN — SODIUM CHLORIDE SCH MLS: 9 INJECTION, SOLUTION INTRAVENOUS at 00:16

## 2023-05-21 RX ADMIN — Medication SCH PKT: at 20:10

## 2023-05-21 RX ADMIN — HUMAN INSULIN SCH: 100 INJECTION, SOLUTION SUBCUTANEOUS at 11:30

## 2023-05-21 RX ADMIN — PROMETHAZINE HYDROCHLORIDE PRN MG: 25 INJECTION INTRAMUSCULAR; INTRAVENOUS at 00:00

## 2023-05-21 RX ADMIN — MUPIROCIN SCH APPL: 20 OINTMENT TOPICAL at 20:07

## 2023-05-21 RX ADMIN — MUPIROCIN SCH APPL: 20 OINTMENT TOPICAL at 08:14

## 2023-05-21 RX ADMIN — HUMAN INSULIN SCH: 100 INJECTION, SOLUTION SUBCUTANEOUS at 07:30

## 2023-05-21 RX ADMIN — HYDROMORPHONE HYDROCHLORIDE PRN MG: 2 INJECTION INTRAMUSCULAR; INTRAVENOUS; SUBCUTANEOUS at 16:32

## 2023-05-21 RX ADMIN — PROMETHAZINE HYDROCHLORIDE PRN MG: 25 INJECTION INTRAMUSCULAR; INTRAVENOUS at 11:01

## 2023-05-21 RX ADMIN — SODIUM CHLORIDE SCH MG: 0.9 INJECTION, SOLUTION INTRAVENOUS at 08:13

## 2023-05-21 RX ADMIN — PROMETHAZINE HYDROCHLORIDE PRN MG: 25 INJECTION INTRAMUSCULAR; INTRAVENOUS at 16:32

## 2023-05-21 RX ADMIN — HYDROMORPHONE HYDROCHLORIDE PRN MG: 2 INJECTION INTRAMUSCULAR; INTRAVENOUS; SUBCUTANEOUS at 19:17

## 2023-05-21 RX ADMIN — HYDROMORPHONE HYDROCHLORIDE PRN MG: 2 INJECTION INTRAMUSCULAR; INTRAVENOUS; SUBCUTANEOUS at 00:00

## 2023-05-21 NOTE — P.PN
Date of Service: 05/21/23





Subjective


Subjective: 


Patient doing well with no new changes; still with pain; no help at home and 

thinks he would benefit from LTAC placement; will d/w PCP





Review of Systems


10-point ROS is otherwise unremarkable





Physical Examination





- Vital Signs


reviewed





- Physical Exam


General: Alert, In no apparent distress


Respiratory: Clear to auscultation bilaterally, Normal air movement


Cardiovascular: Regular rate/rhythm, Normal S1 S2


Gastrointestinal: Normal bowel sounds, No tenderness


Musculoskeletal: No tenderness


Neurological: Normal speech, Normal tone, Normal affect





Assessment And Plan





- Current Problems (Diagnosis)


(1) Pressure ulcer of unspecified site, unspecified stage


Current Visit: Yes   Status: Chronic   


Pressure injury location: ankle   Pressure injury stage: stage 2   Laterality: 

unspecified laterality   Qualified Code(s): L89.502 - Pressure ulcer of 

unspecified ankle, stage 2   





(2) Hyperosmolar non-ketotic state due to type 2 diabetes mellitus


Current Visit: No   Status: Acute   





(3) Chronic suprapubic catheter


Current Visit: Yes   Status: Chronic   





(4) Chronic pain disorder


Onset Date: 11/03/17   Current Visit: No   Status: Chronic   





(5) Spina bifida


Onset Date: 11/03/17   Current Visit: No   Status: Chronic   


Spinal region: lumbar 





(6) Depression


Current Visit: Yes   Status: Chronic   


Depression Type: major depressive disorder   Major depression recurrence: 

recurrent   Active/Remission status: currently active   Psychotic features: 

without psychotic features 





(7) Anemia


Current Visit: Yes   Status: Acute   


Anemia type: unspecified type   Qualified Code(s): D64.9 - Anemia, unspecified  










1. Continue with IV abx


2. s/p diverting colostomy; wound management


3. Strict BS control


4. Pain control


5. Wound care


6. Monior H&H


7. GI/DVT prophylaxis

## 2023-05-22 LAB
ALBUMIN SERPL BCP-MCNC: 2.6 G/DL (ref 3.4–5)
ALP SERPL-CCNC: 104 U/L (ref 45–117)
ALT SERPL W P-5'-P-CCNC: 39 U/L (ref 16–61)
AST SERPL W P-5'-P-CCNC: 18 U/L (ref 15–37)
BUN BLD-MCNC: 11 MG/DL (ref 7–18)
GLUCOSE SERPLBLD-MCNC: 119 MG/DL (ref 74–106)
HCT VFR BLD CALC: 26.3 % (ref 39.6–49)
LYMPHOCYTES # SPEC AUTO: 2.6 K/UL (ref 0.7–4.9)
MAGNESIUM SERPL-MCNC: 1.7 MG/DL (ref 1.6–2.4)
MCV RBC: 91.4 FL (ref 80–100)
PMV BLD: 7.2 FL (ref 7.6–11.3)
POTASSIUM SERPL-SCNC: 3.9 MEQ/L (ref 3.5–5.1)
RBC # BLD: 2.87 M/UL (ref 4.33–5.43)

## 2023-05-22 RX ADMIN — SODIUM CHLORIDE SCH MLS: 0.9 INJECTION, SOLUTION INTRAVENOUS at 08:21

## 2023-05-22 RX ADMIN — HYDROMORPHONE HYDROCHLORIDE PRN MG: 2 INJECTION INTRAMUSCULAR; INTRAVENOUS; SUBCUTANEOUS at 05:08

## 2023-05-22 RX ADMIN — HUMAN INSULIN SCH: 100 INJECTION, SOLUTION SUBCUTANEOUS at 07:30

## 2023-05-22 RX ADMIN — Medication SCH: at 21:00

## 2023-05-22 RX ADMIN — HUMAN INSULIN SCH: 100 INJECTION, SOLUTION SUBCUTANEOUS at 21:00

## 2023-05-22 RX ADMIN — HUMAN INSULIN SCH: 100 INJECTION, SOLUTION SUBCUTANEOUS at 16:17

## 2023-05-22 RX ADMIN — Medication SCH APPL: at 08:28

## 2023-05-22 RX ADMIN — SODIUM CHLORIDE SCH: 9 INJECTION, SOLUTION INTRAVENOUS at 16:59

## 2023-05-22 RX ADMIN — HUMAN INSULIN SCH: 100 INJECTION, SOLUTION SUBCUTANEOUS at 11:30

## 2023-05-22 RX ADMIN — PROMETHAZINE HYDROCHLORIDE PRN MG: 25 INJECTION INTRAMUSCULAR; INTRAVENOUS at 11:40

## 2023-05-22 RX ADMIN — HYDROMORPHONE HYDROCHLORIDE PRN MG: 2 INJECTION INTRAMUSCULAR; INTRAVENOUS; SUBCUTANEOUS at 23:36

## 2023-05-22 RX ADMIN — ENOXAPARIN SODIUM SCH MG: 40 INJECTION SUBCUTANEOUS at 22:30

## 2023-05-22 RX ADMIN — HYDROMORPHONE HYDROCHLORIDE PRN MG: 2 INJECTION INTRAMUSCULAR; INTRAVENOUS; SUBCUTANEOUS at 08:13

## 2023-05-22 RX ADMIN — MUPIROCIN SCH: 20 OINTMENT TOPICAL at 21:00

## 2023-05-22 RX ADMIN — PROMETHAZINE HYDROCHLORIDE PRN MG: 25 INJECTION INTRAMUSCULAR; INTRAVENOUS at 05:08

## 2023-05-22 RX ADMIN — HYDROMORPHONE HYDROCHLORIDE PRN MG: 2 INJECTION INTRAMUSCULAR; INTRAVENOUS; SUBCUTANEOUS at 02:28

## 2023-05-22 RX ADMIN — SODIUM CHLORIDE SCH MLS: 9 INJECTION, SOLUTION INTRAVENOUS at 01:01

## 2023-05-22 RX ADMIN — MUPIROCIN SCH: 20 OINTMENT TOPICAL at 08:43

## 2023-05-22 RX ADMIN — SODIUM CHLORIDE SCH MG: 0.9 INJECTION, SOLUTION INTRAVENOUS at 08:14

## 2023-05-22 RX ADMIN — Medication SCH: at 09:00

## 2023-05-22 RX ADMIN — SODIUM CHLORIDE SCH MLS: 9 INJECTION, SOLUTION INTRAVENOUS at 08:18

## 2023-05-22 RX ADMIN — PROMETHAZINE HYDROCHLORIDE PRN MG: 25 INJECTION INTRAMUSCULAR; INTRAVENOUS at 23:36

## 2023-05-22 RX ADMIN — Medication SCH PKT: at 08:16

## 2023-05-22 RX ADMIN — HYDROMORPHONE HYDROCHLORIDE PRN MG: 2 INJECTION INTRAMUSCULAR; INTRAVENOUS; SUBCUTANEOUS at 11:40

## 2023-05-22 RX ADMIN — SODIUM CHLORIDE SCH MG: 0.9 INJECTION, SOLUTION INTRAVENOUS at 08:13

## 2023-05-22 RX ADMIN — INSULIN GLARGINE SCH UNIT: 100 INJECTION, SOLUTION SUBCUTANEOUS at 08:29

## 2023-05-22 RX ADMIN — COLLAGENASE SANTYL SCH APPL: 250 OINTMENT TOPICAL at 08:24

## 2023-05-22 RX ADMIN — HYDROMORPHONE HYDROCHLORIDE PRN MG: 2 INJECTION INTRAMUSCULAR; INTRAVENOUS; SUBCUTANEOUS at 15:59

## 2023-05-22 NOTE — P.PN
Subjective


Date of Service: 05/20/23


Primary Care Provider: LIDA


Chief Complaint: Respiratory failure





No Acute Events, tolerating diet well, + bowel movements.


only complains of abdominal pain, not changed from prior

















Physical Examination





- Vital Signs


Temperature: 98.9 F


Blood Pressure: 148/88


Pulse: 108


Respirations: 18


Pulse Ox (%): 92





- Physical Exam


General: Alert, In no apparent distress, Cooperative


HEENT: Mucous membr. moist/pink


Respiratory: Clear to auscultation bilaterally


Cardiovascular: Regular rate/rhythm


Gastrointestinal: Non-distended, No masses, No rebound, No guarding, Other (mild

appropriate TTP, ND,incision is clean and well packed, no infection)


Integumentary: Other (perineal wound unchanged)


Neurological: Normal speech





- Studies


Medications List Reviewed: Yes





Assessment And Plan





- Current Problems (Diagnosis)


(1) S/P exploratory laparotomy


Current Visit: Yes   Status: Acute   


Plan: 











Patient is a 37 year old man s/p Exploratory Laparotomy, Adhesiolysis with 

primary closure on 5-





Gen/ Neuro: + pain medication Rx currently given - dilaudid, patient is 

extubated, asking for pain medication, has been given dilaudid PRN will modify 

dose


CVS: sinus tachycardia now recurred, possible due to pain,  hypotension has 

resolved, off all pressors, Dr. Stringer Consulted,  echo noted, no strain 

pattern, EF>80 


Pulm: Extubated, oxygenating well,   CT PE protocol noted, no obvious PE, but 

limited study, ultrasound of bilateral lower extremities are limited but no 

obvious DVT noted, Dr. Bacon consulted, chest x ray - mild atelectasis, daily

chest x ray.


GI: serial exams, no blood on dressings, clean


FEN:  Electrolyte replacement protocol for Mg, Phos, K, nutrition- patient 

started on PO clears but has no appetite, will start TPN,  hot air blanket 

([employer]) PRN, calorie count. diet advanced


Endo: hyperglycemia - insulin GGT has been weaned off, anticipate hyperglycemia 

with start of TPN


Renal: monitor urine output via both suprapubic and contreras catheters, creatinine 

improved, urine output has stable


ID: Merropenem, patient was given 1 dose of Rocephin preoperatively, wean off


Prophylaxis: Lovenox, incentive spirometry


Tubes / Lines: PICC line, LEFT femoral central line, RIGHT arterial line, 

suprapubic catheter, contreras catheter,  rectal temp probe, DC picc line due to 

disfunction


Heme: Hbg stable today, monitor for now 


PT/OT - consult, 


Placement: [facility] vs considering Hospice


Wound: continue wound protection with santyl, Vashe, daily, cover with occlusive

dressings away from fecal stream as much as possible, consider rectal tube




















Physician Review: Patient Assessed, Agree with Above Assessment and Plan

## 2023-05-22 NOTE — P.PN
Subjective


Date of Service: 05/21/23


Primary Care Provider: LIDA


Chief Complaint: Respiratory failure





No Acute Events, tolerating diet well, + bowel movements.


only complains of abdominal pain, not changed from prior

















Physical Examination





- Vital Signs


Temperature: 98.9 F


Blood Pressure: 148/88


Pulse: 108


Respirations: 18


Pulse Ox (%): 92





- Physical Exam


General: Alert, In no apparent distress, Cooperative


Gastrointestinal: Non-distended, No ascites, No masses, No rebound, No guarding,

Other (mild appropriate TTP, ND,incision is clean and well packed, no infection)


Integumentary: Other (wound stable)





- Studies


Medications List Reviewed: Yes





Assessment And Plan





- Current Problems (Diagnosis)


(1) S/P exploratory laparotomy


Current Visit: Yes   Status: Acute   


Plan: 











Patient is a 37 year old man s/p Exploratory Laparotomy, Adhesiolysis with 

primary closure on 5-





Gen/ Neuro: + pain medication Rx currently given - dilaudid, patient is 

extubated, asking for pain medication, has been given dilaudid PRN will modify 

dose


CVS: sinus tachycardia now recurred, possible due to pain,  hypotension has 

resolved, off all pressors, Dr. Stringer Consulted,  echo noted, no strain 

pattern, EF>80 


Pulm: Extubated, oxygenating well,   CT PE protocol noted, no obvious PE, but 

limited study, ultrasound of bilateral lower extremities are limited but no 

obvious DVT noted, Dr. Bacon consulted, chest x ray - mild atelectasis, daily

chest x ray.


GI: serial exams, no blood on dressings, clean


FEN:  Electrolyte replacement protocol for Mg, Phos, K, nutrition- patient 

started on PO clears but has no appetite, will start TPN,  hot air blanket 

([employer]) PRN, calorie count. diet advanced


Endo: hyperglycemia - insulin GGT has been weaned off, anticipate hyperglycemia 

with start of TPN


Renal: monitor urine output via both suprapubic and contreras catheters, creatinine 

improved, urine output has stable


ID: Merropenem, patient was given 1 dose of Rocephin preoperatively, wean off


Prophylaxis: Lovenox, incentive spirometry


Tubes / Lines: PICC line, LEFT femoral central line, RIGHT arterial line, 

suprapubic catheter, contreras catheter,  rectal temp probe, DC picc line due to 

disfunction


Heme: Hbg stable today, monitor for now 


PT/OT - consult, 


Placement: [place] vs considering Hospice


Wound: continue wound protection with santyl, Vashe, daily, cover with occlusive

dressings away from fecal stream as much as possible, consider rectal tube




















Physician Review: Patient Assessed, Agree with Above Assessment and Plan

## 2023-05-22 NOTE — P.PN
Subjective


Date of Service: 05/22/23


Primary Care Provider: LIDA


Chief Complaint: Respiratory failure


Subjective: No new changes





Patient seems in good spirits.  Till asked about pain control.   Then states he 

is in a lot of pain 





Review of Systems


10-point ROS is otherwise unremarkable





Physical Examination





- Vital Signs


Temperature: 99.2 F


Blood Pressure: 124/79


Pulse: 111


Respirations: 18


Pulse Ox (%): 92





- Physical Exam


General: Alert, In no apparent distress


HEENT: Atraumatic, PERRLA, EOMI


Neck: Supple, JVD not distended


Respiratory: Clear to auscultation bilaterally, Normal air movement


Cardiovascular: Regular rate/rhythm, Normal S1 S2


Gastrointestinal: Normal bowel sounds, No tenderness


Musculoskeletal: No tenderness


Integumentary: No rashes


Neurological: Normal speech, Normal tone, Normal affect


Lymphatics: No axilla or inguinal lymphadenopathy





- Studies


Medications List Reviewed: Yes





Assessment And Plan





- Current Problems (Diagnosis)


(1) Pressure ulcer of unspecified site, unspecified stage


Current Visit: Yes   Status: Chronic   


Plan: 


Patient seen by Dr Gordon and Dr. Johnson.  No need for scrotal debridgment.  

Dr Johnson would like to do the sacral wound.  However would like to do a 

colectomy.  this is in the interest of letting the wound heal without chronic 

fecal contamination.   Redd has a long history of difficulty with his hygiene 

in this regard  


5.15 plans for colectomy tomorrow. 


Qualifiers: 


   Pressure injury location: ankle   Pressure injury stage: stage 2   

Laterality: unspecified laterality   Qualified Code(s): L89.502 - Pressure ulcer

of unspecified ankle, stage 2   





(2) Chronic suprapubic catheter


Current Visit: Yes   Status: Chronic   


Plan: 


Is leaking.  Will consult Dr. Gordon 








(3) Chronic pain disorder


Onset Date: 11/03/17   Current Visit: No   Status: Chronic   


Plan: 


restart his hydromorphine 








(4) Spina bifida


Onset Date: 11/03/17   Current Visit: No   Status: Chronic   


Plan: 


Wheelchair bound 


Qualifiers: 


   Spinal region: lumbar 





(5) Depression


Current Visit: Yes   Status: Chronic   


Plan: 


Patient has been having a long history of non compliance with his medication.  

Discussed going to a nursing facility to improve his quality of care.  He is 

very willing. The patient has been had a history of depression.  1 suicide 

attempt in the past.  He is not actively suicidal.  Will start him an 

antidepressant and look for a long term councillor for the patient.  I need 

these reminders that life has been very unfair to Redd.  He acts out at times 

due to this.


Qualifiers: 


   Depression Type: major depressive disorder   Major depression recurrence: 

recurrent   Active/Remission status: currently active   Psychotic features: 

without psychotic features 





(6) Anemia


Current Visit: Yes   Status: Acute   


Plan: 


will transfuse 2 more units of blood  check for occult blood from a GI source. 


Qualifiers: 


   Anemia type: unspecified type   Qualified Code(s): D64.9 - Anemia, 

unspecified   





(7) Diabetes


Current Visit: No   Status: Chronic   


Qualifiers: 


   Diabetes mellitus type: type 2   Diabetes mellitus long term insulin use: 

with long term use   Diabetes mellitus complication status: with skin 

complications   Diabetes mellitus complication detail: with foot ulcer   

Qualified Code(s): E11.621 - Type 2 diabetes mellitus with foot ulcer; L97.509 -

Non-pressure chronic ulcer of other part of unspecified foot with unspecified 

severity; Z79.4 - Long term (current) use of insulin   


Discharge Plan: Nursing Home





- Code Status/Comfort Care


Code Status Assessed: No


Physician Review: Patient Assessed, Agree with Above Assessment and Plan


Critical Care: No


Time Spent Managing PTS Care (In Minutes): 20

## 2023-05-22 NOTE — P.PN
Subjective


Date of Service: 05/22/23


Primary Care Provider: LIDA


Chief Complaint: Respiratory failure





No Acute Events, tolerating diet well, + bowel movements.


only complains of abdominal pain, not changed from prior

















Physical Examination





- Vital Signs


Temperature: 98.9 F


Blood Pressure: 148/88


Pulse: 108


Respirations: 18


Pulse Ox (%): 92





- Physical Exam


General: Alert, In no apparent distress, Cooperative


Gastrointestinal: Non-distended, No ascites, No tenderness, No masses, No 

rebound, No guarding, Other (mild appropriate TTP, ND,incision is clean and well

packed, no infection)


Integumentary: Other (perineal wound stable)


Neurological: Normal speech





- Studies


Medications List Reviewed: Yes





Assessment And Plan





- Current Problems (Diagnosis)


(1) S/P exploratory laparotomy


Current Visit: Yes   Status: Acute   


Plan: 











Patient is a 37 year old man s/p Exploratory Laparotomy, Adhesiolysis with 

primary closure on 5-





Gen/ Neuro: + pain medication Rx currently given - dilaudid, patient is 

extubated, asking for pain medication, has been given dilaudid PRN will modify 

dose


CVS: sinus tachycardia now recurred, possible due to pain,  hypotension has 

resolved, off all pressors, Dr. Stringer Consulted,  echo noted, no strain pat

tern, EF>80 


Pulm: Extubated, oxygenating well,   CT PE protocol noted, no obvious PE, but 

limited study, ultrasound of bilateral lower extremities are limited but no 

obvious DVT noted, Dr. Bacon consulted, chest x ray - mild atelectasis, daily

chest x ray.


GI: serial exams, no blood on dressings, clean


FEN:  Electrolyte replacement protocol for Mg, Phos, K, nutrition- patient 

started on PO clears but has no appetite, will start TPN,  hot air blanket 

([employer]) PRN, calorie count. diet advanced


Endo: hyperglycemia - insulin GGT has been weaned off, anticipate hyperglycemia 

with start of TPN


Renal: monitor urine output via both suprapubic and contreras catheters, creatinine 

improved, urine output has stable


ID: Merropenem, patient was given 1 dose of Rocephin preoperatively, wean off


Prophylaxis: Lovenox, incentive spirometry


Tubes / Lines: PICC line, LEFT femoral central line, RIGHT arterial line, 

suprapubic catheter, contreras catheter,  rectal temp probe, DC picc line due to 

disfunction


Heme: Hbg stable today, monitor for now 


PT/OT - consult, 


Placement: [facility] vs considering Hospice


Wound: continue wound protection with santyl, Vashe, daily, cover with occlusive

dressings away from fecal stream as much as possible, consider rectal tube




















Physician Review: Patient Assessed, Agree with Above Assessment and Plan

## 2023-05-23 LAB
BUN BLD-MCNC: 13 MG/DL (ref 7–18)
GLUCOSE SERPLBLD-MCNC: 125 MG/DL (ref 74–106)
HCT VFR BLD CALC: 27.7 % (ref 39.6–49)
LYMPHOCYTES # SPEC AUTO: 2.7 K/UL (ref 0.7–4.9)
MAGNESIUM SERPL-MCNC: 1.8 MG/DL (ref 1.6–2.4)
MCV RBC: 92.9 FL (ref 80–100)
PMV BLD: 7.8 FL (ref 7.6–11.3)
POTASSIUM SERPL-SCNC: 4.1 MEQ/L (ref 3.5–5.1)
RBC # BLD: 2.98 M/UL (ref 4.33–5.43)

## 2023-05-23 RX ADMIN — HYDROMORPHONE HYDROCHLORIDE PRN MG: 2 INJECTION INTRAMUSCULAR; INTRAVENOUS; SUBCUTANEOUS at 03:04

## 2023-05-23 RX ADMIN — MUPIROCIN SCH APPL: 20 OINTMENT TOPICAL at 08:56

## 2023-05-23 RX ADMIN — INSULIN GLARGINE SCH UNIT: 100 INJECTION, SOLUTION SUBCUTANEOUS at 08:55

## 2023-05-23 RX ADMIN — HYDROMORPHONE HYDROCHLORIDE PRN MG: 2 INJECTION INTRAMUSCULAR; INTRAVENOUS; SUBCUTANEOUS at 22:28

## 2023-05-23 RX ADMIN — NITROFURANTOIN MONOHYDRATE/MACROCRYSTALLINE SCH MG: 25; 75 CAPSULE ORAL at 20:01

## 2023-05-23 RX ADMIN — Medication SCH: at 20:01

## 2023-05-23 RX ADMIN — Medication SCH ML: at 08:55

## 2023-05-23 RX ADMIN — HUMAN INSULIN SCH: 100 INJECTION, SOLUTION SUBCUTANEOUS at 20:01

## 2023-05-23 RX ADMIN — COLLAGENASE SANTYL SCH APPL: 250 OINTMENT TOPICAL at 08:56

## 2023-05-23 RX ADMIN — HYDROMORPHONE HYDROCHLORIDE PRN MG: 2 INJECTION INTRAMUSCULAR; INTRAVENOUS; SUBCUTANEOUS at 05:52

## 2023-05-23 RX ADMIN — HUMAN INSULIN SCH: 100 INJECTION, SOLUTION SUBCUTANEOUS at 11:30

## 2023-05-23 RX ADMIN — PROMETHAZINE HYDROCHLORIDE PRN MG: 25 INJECTION INTRAMUSCULAR; INTRAVENOUS at 13:05

## 2023-05-23 RX ADMIN — HYDROMORPHONE HYDROCHLORIDE PRN MG: 2 INJECTION INTRAMUSCULAR; INTRAVENOUS; SUBCUTANEOUS at 08:55

## 2023-05-23 RX ADMIN — ENOXAPARIN SODIUM SCH MG: 40 INJECTION SUBCUTANEOUS at 20:01

## 2023-05-23 RX ADMIN — PROMETHAZINE HYDROCHLORIDE PRN MG: 25 INJECTION INTRAMUSCULAR; INTRAVENOUS at 22:28

## 2023-05-23 RX ADMIN — SODIUM CHLORIDE SCH MLS: 9 INJECTION, SOLUTION INTRAVENOUS at 04:02

## 2023-05-23 RX ADMIN — Medication SCH PKT: at 08:57

## 2023-05-23 RX ADMIN — SODIUM CHLORIDE SCH MLS: 9 INJECTION, SOLUTION INTRAVENOUS at 16:25

## 2023-05-23 RX ADMIN — HUMAN INSULIN SCH: 100 INJECTION, SOLUTION SUBCUTANEOUS at 16:13

## 2023-05-23 RX ADMIN — SODIUM CHLORIDE SCH MLS: 9 INJECTION, SOLUTION INTRAVENOUS at 08:55

## 2023-05-23 RX ADMIN — HUMAN INSULIN SCH: 100 INJECTION, SOLUTION SUBCUTANEOUS at 07:30

## 2023-05-23 RX ADMIN — HYDROMORPHONE HYDROCHLORIDE PRN MG: 2 INJECTION INTRAMUSCULAR; INTRAVENOUS; SUBCUTANEOUS at 13:05

## 2023-05-23 RX ADMIN — HYDROMORPHONE HYDROCHLORIDE PRN MG: 2 INJECTION INTRAMUSCULAR; INTRAVENOUS; SUBCUTANEOUS at 17:09

## 2023-05-23 RX ADMIN — MUPIROCIN SCH: 20 OINTMENT TOPICAL at 20:02

## 2023-05-23 RX ADMIN — Medication SCH APPL: at 08:56

## 2023-05-23 RX ADMIN — PROMETHAZINE HYDROCHLORIDE PRN MG: 25 INJECTION INTRAMUSCULAR; INTRAVENOUS at 05:52

## 2023-05-23 NOTE — P.PN
Subjective


Date of Service: 05/23/23


Primary Care Provider: LIDA


Chief Complaint: Respiratory failure


Subjective: No new changes





Patient seems in good spirits.  Till asked about pain control.   Then states he 

is in a lot of pain 





Review of Systems


10-point ROS is otherwise unremarkable





Physical Examination





- Vital Signs


Temperature: 98.9 F


Blood Pressure: 148/88


Pulse: 108


Respirations: 18


Pulse Ox (%): 92





- Physical Exam


General: Alert, In no apparent distress


HEENT: Atraumatic, PERRLA, EOMI


Neck: Supple, JVD not distended


Respiratory: Clear to auscultation bilaterally, Normal air movement


Cardiovascular: Regular rate/rhythm, Normal S1 S2


Gastrointestinal: Normal bowel sounds, No tenderness


Musculoskeletal: No tenderness


Integumentary: No rashes


Neurological: Normal speech, Normal tone, Normal affect


Lymphatics: No axilla or inguinal lymphadenopathy





- Studies


Medications List Reviewed: Yes





Assessment And Plan





- Current Problems (Diagnosis)


(1) Pressure ulcer of unspecified site, unspecified stage


Current Visit: Yes   Status: Chronic   


Plan: 


Patient seen by Dr Gordon and Dr. Johnson.  No need for scrotal debridgment.  

Dr Johnson would like to do the sacral wound.  However would like to do a 

colectomy.  this is in the interest of letting the wound heal without chronic 

fecal contamination.   Redd has a long history of difficulty with his hygiene 

in this regard  


5.23


start weaning his insulin. Will not be trying a coloectomy on this patient   

Will see if we can get him into [facility].  If not will have to send him 

home.  This is going to be difficult.  As we cannot find home health to take 

Redd.  He has burned quite a few bridges in the local medical community.


Qualifiers: 


   Pressure injury location: ankle   Pressure injury stage: stage 2   L

aterality: unspecified laterality   Qualified Code(s): L89.502 - Pressure ulcer 

of unspecified ankle, stage 2   





(2) Chronic suprapubic catheter


Current Visit: Yes   Status: Chronic   


Plan: 


Is leaking.  Will consult Dr. Gorodn 








(3) Chronic pain disorder


Onset Date: 11/03/17   Current Visit: No   Status: Chronic   


Plan: 


restart his hydromorphine 








(4) Spina bifida


Onset Date: 11/03/17   Current Visit: No   Status: Chronic   


Plan: 


Wheelchair bound 


Qualifiers: 


   Spinal region: lumbar 





(5) Depression


Current Visit: Yes   Status: Chronic   


Plan: 


Patient has been having a long history of non compliance with his medication.  

Discussed going to a nursing facility to improve his quality of care.  He is 

very willing. The patient has been had a history of depression.  1 suicide 

attempt in the past.  He is not actively suicidal.  Will start him an 

antidepressant and look for a long term councillor for the patient.  I need 

these reminders that life has been very unfair to Redd.  He acts out at times 

due to this.


Qualifiers: 


   Depression Type: major depressive disorder   Major depression recurrence: 

recurrent   Active/Remission status: currently active   Psychotic features: 

without psychotic features 





(6) Anemia


Current Visit: Yes   Status: Acute   


Plan: 


will transfuse 2 more units of blood  check for occult blood from a GI source. 


Qualifiers: 


   Anemia type: unspecified type   Qualified Code(s): D64.9 - Anemia, 

unspecified   





(7) Diabetes


Current Visit: No   Status: Chronic   


Qualifiers: 


   Diabetes mellitus type: type 2   Diabetes mellitus long term insulin use: 

with long term use   Diabetes mellitus complication status: with skin 

complications   Diabetes mellitus complication detail: with foot ulcer   

Qualified Code(s): E11.621 - Type 2 diabetes mellitus with foot ulcer; L97.509 -

Non-pressure chronic ulcer of other part of unspecified foot with unspecified se

verity; Z79.4 - Long term (current) use of insulin   


Discharge Plan: LTAC


Plan to discharge in: 24 Hours





- Code Status/Comfort Care


Code Status Assessed: No


Physician Review: Patient Assessed, Agree with Above Assessment and Plan


Critical Care: No


Time Spent Managing PTS Care (In Minutes): 20

## 2023-05-24 VITALS — DIASTOLIC BLOOD PRESSURE: 78 MMHG | OXYGEN SATURATION: 90 % | SYSTOLIC BLOOD PRESSURE: 129 MMHG | TEMPERATURE: 97.3 F

## 2023-05-24 LAB
BUN BLD-MCNC: 18 MG/DL (ref 7–18)
GLUCOSE SERPLBLD-MCNC: 145 MG/DL (ref 74–106)
POTASSIUM SERPL-SCNC: 4.2 MEQ/L (ref 3.5–5.1)

## 2023-05-24 RX ADMIN — HUMAN INSULIN SCH: 100 INJECTION, SOLUTION SUBCUTANEOUS at 07:30

## 2023-05-24 RX ADMIN — HYDROMORPHONE HYDROCHLORIDE PRN MG: 2 INJECTION INTRAMUSCULAR; INTRAVENOUS; SUBCUTANEOUS at 04:04

## 2023-05-24 RX ADMIN — PROMETHAZINE HYDROCHLORIDE PRN MG: 25 INJECTION INTRAMUSCULAR; INTRAVENOUS at 09:56

## 2023-05-24 RX ADMIN — COLLAGENASE SANTYL SCH APPL: 250 OINTMENT TOPICAL at 08:37

## 2023-05-24 RX ADMIN — INSULIN GLARGINE SCH UNIT: 100 INJECTION, SOLUTION SUBCUTANEOUS at 08:36

## 2023-05-24 RX ADMIN — Medication SCH ML: at 08:36

## 2023-05-24 RX ADMIN — Medication SCH PKT: at 08:36

## 2023-05-24 RX ADMIN — Medication SCH APPL: at 08:36

## 2023-05-24 RX ADMIN — MUPIROCIN SCH APPL: 20 OINTMENT TOPICAL at 08:37

## 2023-05-24 RX ADMIN — NITROFURANTOIN MONOHYDRATE/MACROCRYSTALLINE SCH MG: 25; 75 CAPSULE ORAL at 08:36

## 2023-05-24 RX ADMIN — HYDROMORPHONE HYDROCHLORIDE PRN MG: 2 INJECTION INTRAMUSCULAR; INTRAVENOUS; SUBCUTANEOUS at 09:56

## 2023-05-24 NOTE — P.DS
Admission Date: 05/03/23


Discharge Date: 05/24/23


Primary Care Provider: LIDA


Disposition: ROUTINE DISCHARGE


Discharge Condition: FAIR


Reason for Admission: Respiratory failure





- Problems


(1) Pressure ulcer of unspecified site, unspecified stage


Current Visit: Yes   Status: Chronic   


Qualifiers: 


   Pressure injury location: ankle   Pressure injury stage: stage 2   

Laterality: unspecified laterality   Qualified Code(s): L89.502 - Pressure ulcer

of unspecified ankle, stage 2   





(2) Chronic suprapubic catheter


Current Visit: Yes   Status: Chronic   





(3) Chronic pain disorder


Onset Date: 11/03/17   Current Visit: No   Status: Chronic   





(4) Spina bifida


Onset Date: 11/03/17   Current Visit: No   Status: Chronic   


Qualifiers: 


   Spinal region: lumbar 





(5) Depression


Current Visit: Yes   Status: Chronic   


Qualifiers: 


   Depression Type: major depressive disorder   Major depression recurrence: 

recurrent   Active/Remission status: currently active   Psychotic features: 

without psychotic features 





(6) Anemia


Current Visit: Yes   Status: Acute   


Qualifiers: 


   Anemia type: unspecified type   Qualified Code(s): D64.9 - Anemia, 

unspecified   





(7) Diabetes


Current Visit: No   Status: Chronic   


Qualifiers: 


   Diabetes mellitus type: type 2   Diabetes mellitus long term insulin use: 

with long term use   Diabetes mellitus complication status: with skin compli

cations   Diabetes mellitus complication detail: with foot ulcer   Qualified 

Code(s): E11.621 - Type 2 diabetes mellitus with foot ulcer; L97.509 - Non-

pressure chronic ulcer of other part of unspecified foot with unspecified 

severity; Z79.4 - Long term (current) use of insulin   


Brief History of Present Illness: 





Patient is a well know patient with spina bifida and dm2.  He stopped taking his

insulin.   States for 2 days, he usually underreports this.  Has numerous 

hospitalizations here and in [facility] for this reason.  He is poor for 

follow up.  Or calling the office for diabetic supplies.  Came into the ER With 

a cough yesterday.  Found to have a sugar in the 900's   States he was not 

taking his sugar and drinking lemonade 


Hospital Course: 





Patient was admitted for hyperglycemia due to non compliance with his ulcer.  He

was found to have a large scrotal and perineal pressure ulcer.  Dr. Johnson was 

consulted and the plan was for a colectomy and mild debridgement.  Unfortunately

the patient crashed on the operating table and was intubated.  He was extubated.

 As Redd suffers from Spina bifida.   Keeping his wounds clean is a big 

difficulty.  We have tried to have him placed in local nursing homes and wound 

care centers.  Unfortunately the patient wound not give his financial info for 

medicare.  He has been fired by all the local home health companies.  He was not

able to be place due to only having medicaid.  We can discharge him home   Have 

him follow up with me and Dr. Johnson. 


Vital Signs/Physical Exam: 














Temp Pulse Resp BP Pulse Ox


 


 98.4 F   111 H  18   141/95 H  92 


 


 05/23/23 16:00  05/23/23 16:00  05/23/23 16:00  05/23/23 16:00  05/23/23 08:22








General: Alert, In no apparent distress


HEENT: Atraumatic, PERRLA, EOMI


Neck: Supple, JVD not distended


Respiratory: Clear to auscultation bilaterally, Normal air movement


Cardiovascular: Regular rate/rhythm, Normal S1 S2


Gastrointestinal: Normal bowel sounds, No tenderness


Musculoskeletal: No tenderness


Integumentary: No rashes


Neurological: Normal speech, Normal tone, Normal affect


Lymphatics: No axilla or inguinal lymphadenopathy


Laboratory Data at Discharge: 














WBC  13.90 thou/uL (4.3-10.9)  H  05/23/23  06:15    


 


Hgb  8.8 g/dL (13.6-17.9)  L  05/23/23  06:15    


 


Hct  27.7 % (39.6-49.0)  L  05/23/23  06:15    


 


Plt Count  443 thou/uL (152-406)  H  05/23/23  06:15    


 


PT  14.3 SECONDS (9.5-12.5)  H  05/17/23  08:16    


 


INR  1.30   05/17/23  08:16    


 


APTT  26.4 SECONDS (24.3-36.9)   05/17/23  08:16    


 


Sodium  133 mEq/L (136-145)  L  05/23/23  06:15    


 


Potassium  4.1 mEq/L (3.5-5.1)   05/23/23  06:15    


 


BUN  13 mg/dL (7-18)   05/23/23  06:15    


 


Creatinine  0.55 mg/dL (0.70-1.30)  L  05/23/23  06:15    


 


Glucose  125 mg/dL ()  H  05/23/23  06:15    


 


Phosphorus  3.7 mg/dL (2.5-4.9)   05/23/23  06:15    


 


Magnesium  1.8 mg/dL (1.6-2.4)   05/23/23  06:15    


 


Total Bilirubin  0.8 mg/dL (0.2-1.0)   05/22/23  05:25    


 


AST  18 U/L (15-37)   05/22/23  05:25    


 


ALT  39 U/L (16-61)   05/22/23  05:25    


 


Alkaline Phosphatase  104 U/L ()   05/22/23  05:25    








Home Medications: 








Hydromorphone [Dilaudid] 4 mg PO Q8HP PRN 03/14/22 


Lisinopril [Zestril] 2.5 mg PO DAILY 01/24/23 


Insulin Glargine,Hum.rec.anlog [Semglee] 45 unit SQ DAILY 01/30/23 


Bupropion *Xl* [Wellbutrin XL*] 150 mg PO DAILY 90 Days #90 tab 05/24/23 





New Medications: 


Bupropion *Xl* [Wellbutrin XL*] 150 mg PO DAILY 90 Days #90 tab


Diet: ADA


Activity: Ad rickie


Followup: 


Dandre Johnson MD [ACTIVE - CAN ADMIT] - 1 Week


Domingo Mc MD [ACTIVE - CAN ADMIT] - 1-2 Weeks


Time spent managing pt's care (in minutes): 340

## 2023-05-25 ENCOUNTER — HOSPITAL ENCOUNTER (EMERGENCY)
Dept: HOSPITAL 97 - ER | Age: 38
Discharge: HOME | End: 2023-05-25
Payer: COMMERCIAL

## 2023-05-25 VITALS — SYSTOLIC BLOOD PRESSURE: 122 MMHG | DIASTOLIC BLOOD PRESSURE: 83 MMHG | OXYGEN SATURATION: 91 %

## 2023-05-25 VITALS — TEMPERATURE: 98.7 F

## 2023-05-25 DIAGNOSIS — Z88.8: ICD-10-CM

## 2023-05-25 DIAGNOSIS — F17.210: ICD-10-CM

## 2023-05-25 DIAGNOSIS — I10: ICD-10-CM

## 2023-05-25 DIAGNOSIS — I25.2: ICD-10-CM

## 2023-05-25 DIAGNOSIS — R10.84: Primary | ICD-10-CM

## 2023-05-25 DIAGNOSIS — Z88.5: ICD-10-CM

## 2023-05-25 DIAGNOSIS — Z88.1: ICD-10-CM

## 2023-05-25 DIAGNOSIS — Z88.3: ICD-10-CM

## 2023-05-25 DIAGNOSIS — Q05.9: ICD-10-CM

## 2023-05-25 DIAGNOSIS — Z88.0: ICD-10-CM

## 2023-05-25 DIAGNOSIS — E11.9: ICD-10-CM

## 2023-05-25 LAB
ALBUMIN SERPL BCP-MCNC: 2.5 G/DL (ref 3.4–5)
ALP SERPL-CCNC: 298 U/L (ref 45–117)
ALT SERPL W P-5'-P-CCNC: 79 U/L (ref 16–61)
AST SERPL W P-5'-P-CCNC: 138 U/L (ref 15–37)
BUN BLD-MCNC: 24 MG/DL (ref 7–18)
GLUCOSE SERPLBLD-MCNC: 114 MG/DL (ref 74–106)
HCT VFR BLD CALC: 26.6 % (ref 39.6–49)
INR BLD: 1.22
LIPASE SERPL-CCNC: 14 U/L (ref 13–75)
LYMPHOCYTES # SPEC AUTO: 1.7 K/UL (ref 0.7–4.9)
MCV RBC: 90.1 FL (ref 80–100)
PMV BLD: 7.4 FL (ref 7.6–11.3)
POTASSIUM SERPL-SCNC: 3.9 MEQ/L (ref 3.5–5.1)
RBC # BLD: 2.95 M/UL (ref 4.33–5.43)

## 2023-05-25 PROCEDURE — 87088 URINE BACTERIA CULTURE: CPT

## 2023-05-25 PROCEDURE — 81001 URINALYSIS AUTO W/SCOPE: CPT

## 2023-05-25 PROCEDURE — 99284 EMERGENCY DEPT VISIT MOD MDM: CPT

## 2023-05-25 PROCEDURE — 71275 CT ANGIOGRAPHY CHEST: CPT

## 2023-05-25 PROCEDURE — 93005 ELECTROCARDIOGRAM TRACING: CPT

## 2023-05-25 PROCEDURE — 36415 COLL VENOUS BLD VENIPUNCTURE: CPT

## 2023-05-25 PROCEDURE — 96361 HYDRATE IV INFUSION ADD-ON: CPT

## 2023-05-25 PROCEDURE — 83690 ASSAY OF LIPASE: CPT

## 2023-05-25 PROCEDURE — 85610 PROTHROMBIN TIME: CPT

## 2023-05-25 PROCEDURE — 83605 ASSAY OF LACTIC ACID: CPT

## 2023-05-25 PROCEDURE — 85730 THROMBOPLASTIN TIME PARTIAL: CPT

## 2023-05-25 PROCEDURE — 96365 THER/PROPH/DIAG IV INF INIT: CPT

## 2023-05-25 PROCEDURE — 87040 BLOOD CULTURE FOR BACTERIA: CPT

## 2023-05-25 PROCEDURE — 82947 ASSAY GLUCOSE BLOOD QUANT: CPT

## 2023-05-25 PROCEDURE — 85025 COMPLETE CBC W/AUTO DIFF WBC: CPT

## 2023-05-25 PROCEDURE — 80053 COMPREHEN METABOLIC PANEL: CPT

## 2023-05-25 PROCEDURE — 87086 URINE CULTURE/COLONY COUNT: CPT

## 2023-05-25 PROCEDURE — 74177 CT ABD & PELVIS W/CONTRAST: CPT

## 2023-05-25 NOTE — RAD REPORT
EXAM DESCRIPTION:  CT - Chest For Pe Angio - 5/25/2023 2:02 pm

 

CLINICAL HISTORY:   Chest pain

 

COMPARISON:  May 19, 2023

 

TECHNIQUE:  Dynamically enhanced axial 3 mm thick images of the chest were obtained during administra
tion of 100 mL Isovue 370 IV contrast. Coronal and oblique reconstruction images were generated and r
eviewed. Exam utilizes a protocol for optimal evaluation of pulmonary arterial tree.

 

Maximum intensity projections 3D imaging was utilized

 

All CT scans are performed using dose optimization technique as appropriate and may include automated
 exposure control or mA/KV adjustment according to patient size.

 

FINDINGS:  Opacification of pulmonary arteries is suboptimal. No gross central pulmonary embolus seen
.

 

A thoracic aortic aneurysm is not noted.

 

A pleural effusion is not seen. A pericardial effusion is not seen.

 

Areas of atelectasis lower lobes

 

IMPRESSION:  No gross evidence of a pulmonary embolus

## 2023-05-25 NOTE — ER
Nurse's Notes                                                                                     

 [facility]                                                                 

Name: Redd Dale Jr                                                                            

Age: 37 yrs                                                                                       

Sex: Male                                                                                         

: 1985                                                                                   

MRN: [id number]                                                                                   

Arrival Date: 2023                                                                          

Time: 11:55                                                                                       

Account#: [id number]                                                                            

Bed 19                                                                                            

Private MD:                                                                                       

Diagnosis: Abdominal pain, Generalized                                                            

                                                                                                  

Presentation:                                                                                     

                                                                                             

11:59 Chief complaint: EMS states: pt had a failed colostomy surgery last week where he coded kc6 

      on the table twice. surgery was then held and the patient was sent home yesterday.          

      today the patient reports continued breakthrough pain. Coronavirus screen: Vaccine          

      status: Patient reports receiving the 2nd dose of the covid vaccine. At this time, the      

      client does not indicate any symptoms associated with coronavirus-19. Ebola Screen: No      

      symptoms or risks identified at this time. Initial Sepsis Screen: Does the patient meet     

      any 2 criteria? HR > 90 bpm. Does the patient have a suspected source of infection? No.     

      Patient's initial sepsis screen is negative. Risk Assessment: Do you want to hurt           

      yourself or someone else? Patient reports no desire to harm self or others. Onset of        

      symptoms was May 25, 2023.                                                                  

11:59 Method Of Arrival: EMS: [facility]                                                    kc6 

11:59 Acuity: AYESHA 3                                                                           kc6 

                                                                                                  

Triage Assessment:                                                                                

12:01 General: Appears in no apparent distress. uncomfortable, obese, Behavior is calm,       kc6 

      cooperative, appropriate for age. Pain: Complains of pain in "all over" Pain currently      

      is 10 out of 10 on a pain scale. Is continuous. EENT: No signs and/or symptoms were         

      reported regarding the EENT system. Neuro: Cerda Agitation-Sedation Scale (RASS): 0      

      - Alert and Calm Level of Consciousness is awake, alert, obeys commands, Oriented to        

      person, place, time, situation, Appropriate for age. Cardiovascular: Heart tones S1 S2      

      present Capillary refill < 3 seconds Rhythm is sinus tachycardia. Respiratory: Airway       

      is patent Trachea midline Respiratory effort is even, unlabored, Respiratory pattern is     

      regular, symmetrical. GI: Abdomen is round non-distended, Bowel sounds present X 4          

      quads. Abd is soft X 4 quads Abdomen is tender to palpation X 4 quads. Patient              

      currently denies diarrhea, nausea, vomiting, abdominal binder in place. : suprapubic      

      catheter in place Urine is blood tinged. Derm: Skin is pink, warm \T\ dry. Wound noted      

      left foot Wound is covered with a dressing that is clean and dry. Musculoskeletal: No       

      signs and/or symptoms reported regarding the musculoskeletal system. Circulation,           

      motion, and sensation intact. Capillary refill < 3 seconds, Range of motion: limited in     

      right and left leg.                                                                         

                                                                                                  

Historical:                                                                                       

- Allergies:                                                                                      

12:01 Amoxicillin;                                                                            kc6 

12:01 Bactrim;                                                                                kc6 

12:01 Ciprofloxacin;                                                                          kc6 

12:01 CLAVULANIC ACID;                                                                        kc6 

12:01 Demerol;                                                                                kc6 

12:01 Doxycycline;                                                                            kc6 

12:01 Levofloxacin;                                                                           kc6 

12:01 Morphine;                                                                               kc6 

12:01 PENICILLINS;                                                                            kc6 

12:01 Toradol;                                                                                kc6 

12:01 TRIMETHOPRIM;                                                                           kc6 

12:01 Vancomycin;                                                                             kc6 

12:01 Zofran;                                                                                 kc6 

- PMHx:                                                                                           

12:01 Asthma; Cerebral Palsy; cluster headaches; decubitus ulcers on feet; diabetes mellitus; kc6 

      GERD; Hydrocephalus; Hypertension; spina bifida;                                            

- PSHx:                                                                                           

12:01 Cholecystectomy; Shunt Revision;                                                        kc6 

                                                                                                  

- Immunization history:: Client reports receiving the 2nd dose of the Covid vaccine,              

  Flu vaccine is up to date.                                                                      

- Social history:: Smoking status: Patient reports the use of cigarette tobacco                   

  products, smokes one pack cigarettes per day.                                                   

                                                                                                  

                                                                                                  

Screenin:05 [facility] ED Fall Risk Assessment (Adult) History of falling in the last 3 months,       kc6 

      including since admission No falls in past 3 months (0 pts) Confusion or Disorientation     

      No (0 pts) Intoxicated or Sedated No (0 pts) Impaired Gait Yes (1 pt) Mobility Assist       

      Device Used Yes (1 pt) Altered Elimination Yes (1 pt) Score/Fall Risk Level 3 or more       

      points = High Risk Oriented to surroundings, Maintained a safe environment, Educated pt     

      \T\ family on fall prevention, incl call for assistance when getting out of bed, Assessed   

      \T\ reinforced patient's understanding of fall precautions, Hourly rounding (assess needs   

      \T\ fall precautionary measures) done. Abuse screen: Denies threats or abuse. Denies        

      injuries from another. Nutritional screening: No deficits noted. Tuberculosis               

      screening: No symptoms or risk factors identified.                                          

                                                                                                  

Assessment:                                                                                       

12:04 Reassessment: please see triage assessment.                                             kc6 

12:25 Reassessment: Jacqui Samuels RN at bedside for ultrasounds guided IV.                      kc6 

13:04 Reassessment: Patient appears in no apparent distress at this time. No changes from     [facility] 

      previously documented assessment. Patient and/or family updated on plan of care and         

      expected duration. Pain level reassessed. Patient is alert, oriented x 3, equal             

      unlabored respirations, skin warm/dry/pink.                                                 

14:17 Reassessment: Patient appears in no apparent distress at this time. No changes from     [facility] 

      previously documented assessment. Patient and/or family updated on plan of care and         

      expected duration. Pain level reassessed. Patient is alert, oriented x 3, equal             

      unlabored respirations, skin warm/dry/pink.                                                 

15:13 Reassessment: Patient appears in no apparent distress at this time. No changes from     [facility] 

      previously documented assessment. Patient and/or family updated on plan of care and         

      expected duration. Pain level reassessed. Patient is alert, oriented x 3, equal             

      unlabored respirations, skin warm/dry/pink.                                                 

15:38 Reassessment: d/c pending transport back to [facility] in        [address].                                                                                   

                                                                                                  

Vital Signs:                                                                                      

11:59  / 83; Pulse 124; Resp 14 S; Temp 98.7(O); Pulse Ox 93% on R/A; Weight 127.01 kg  kc6 

      (R); Height 4 ft. 11 in. (R); Pain 10/10;                                                   

13:34  / 72; Pulse 119; Resp 22 S; Pulse Ox 94% on R/A;                                 kc6 

14:17  / 52; Pulse 118; Resp 16 S; Pulse Ox 94% on R/A;                                 kc6 

15:13  / 83; Pulse 98; Resp 13 S; Pulse Ox 91% on R/A;                                  kc6 

11:59 Body Mass Index 56.55 (127.01 kg, 149.86 cm)                                            kc6 

11:59 Pain Scale: Adult                                                                       [facility] 

                                                                                                  

ED Course:                                                                                        

11:59 Patient arrived in ED.                                                                  kc6 

11:59 Sarkis Gan MD is Attending Physician.                                              bs3 

12:01 Triage completed.                                                                       kc6 

12:01 Arm band placed on.                                                                     kc6 

12:05 Patient has correct armband on for positive identification. Placed in gown. Bed in low  kc6 

      position. Call light in reach. Side rails up X2.                                            

12:06 Christina Lombardi, RN is Primary Nurse.                                                 kc6 

12:16 EKG done, by ED staff, reviewed by Sarkis Gan MD.                                    em1 

12:20 Radiology exam delayed due to IV insertion attempt and/or patient not having            ls3 

      appropriate IV at this time.                                                                

12:29 Urinalysis w/ reflexes Sent.                                                            kc6 

13:00 Missed attempt(s): 20 gauge in right antecubital area. Bleeding controlled, band aid    nj1 

      applied, catheter tip intact.                                                               

13:04 Inserted saline lock: 20 gauge in left forearm, using aseptic technique. ,using aseptic nj1 

      technique. Ultrasound guided, catheter tip well visualized within vasculature during        

      placement. Blood collected.                                                                 

13:04 First set of blood cultures drawn by me.                                                nj1 

14:03 CT Abd/Pelvis - IV Contrast Only In Process Unspecified.                                EDMS

14:04 CT Chest For PE Angio In Process Unspecified.                                           EDMS

15:27 Domingo Mc MD is Referral Physician.                                                bs3 

                                                                                                  

Administered Medications:                                                                         

12:24 Not Given (Patient Refused; allergy): Ondansetron IVP 4 mg IVP once; over 2 minutes     kc6 

13:13 Drug: NS 0.9% IV 1000 ml Route: IV; Rate: 1 bolus; Site: left forearm;                  kc6 

14:42 Follow up: Response: No adverse reaction; IV Status: Completed infusion; IV Intake:     kc6 

      1000ml                                                                                      

13:33 Drug: Rocephin IV 1 grams Route: IV; Rate: 1 bolus; Site: left forearm;                 kc6 

14:43 Follow up: Response: No adverse reaction; IV Status: Completed infusion; IV Intake: [id number] 

14:42 Drug: NS 0.9%  ml Route: IV; Rate: bolus; Site: left forearm;                     kc6 

15:41 Follow up: Response: No adverse reaction; IV Status: Completed infusion; IV Intake:     kc6 

      500ml                                                                                       

14:42 Drug: HYDROmorphone PO 2 mg Route: PO;                                                  kc6 

15:41 Follow up: Response: No adverse reaction; Pain is decreased; RASS: Alert and Calm (0)   kc6 

                                                                                                  

                                                                                                  

Intake:                                                                                           

14:42 IV: 1000ml; Total: 1000ml.                                                              kc6 

14:43 IV: 10ml; Total: 1010ml.                                                                kc6 

15:41 IV: 500ml; Total: 1510ml.                                                               kc6 

                                                                                                  

Outcome:                                                                                          

15:28 Discharge ordered by MD.                                                                bs3 

16:18 Patient left the ED.                                                                    kj1 

                                                                                                  

Signatures:                                                                                       

Dispatcher [employer]                           [id number]                                                 

Tavo Mcintyre                               em1                                                  

Ness Walker                                ls3                                                  

Ariella Amaro                              kj1                                                  

Christina Lombardi RN                   RN   kc6                                                  

Sarkis Gan MD MD   bs3                                                  

Jacqui Samuels RN                         RN   nj1                                                  

                                                                                                  

Corrections: (The following items were deleted from the chart)                                    

12:34 11:59 Chief complaint: EMS states: pt had a failed colostomy surgery last week where he kc6 

      coded on the table twice. surgery was then held and they patient was sent home              

      yesterday. today the patient reports continued breakthrough pain. kc6                       

                                                                                                  

**************************************************************************************************

## 2023-05-25 NOTE — RAD REPORT
EXAM DESCRIPTION:  CT - Abdomen   Pelvis W Contrast - 5/25/2023 2:02 pm

 

CLINICAL HISTORY:  Abdominal pain

 

COMPARISON:  2022

 

TECHNIQUE:  Computed axial tomography of the abdomen pelvis was obtained. 100 cc Isovue-300 was admin
istered intravenously. Oral contrast was not requested which limits evaluation of bowel and appendix

 

All CT scans are performed using dose optimization technique as appropriate and may include automated
 exposure control or mA/KV adjustment according to patient size.

 

FINDINGS:  Liver, spleen, pancreas, adrenals and kidneys are unremarkable

 

 shunt with its tip right abdomen. Small amount of ascites.

 

Suprapubic catheter in place

 

Prominent stool within the rectum/anus and surrounding tissue. No evidence of diverticulitis.

 

Spina bifida.

 

Soft tissue defect midline anterior subcutaneous tissues level of the iliac crests

 

 

IMPRESSION:  Soft tissue defect midline anterior subcutaneous tissues level of the iliac crests eithe
r postsurgical or dehiscence

## 2023-05-25 NOTE — XMS REPORT
Continuity of Care Document

                             Created on:May 25, 2023



Patient:CHITO CLEMENTE JORDAN MADRIGAL

Sex:Male

:1985

External Reference #:[id number]





Demographics







                          Address                   [address]



                                                    [place] [address]

 

                          Home Phone                [phone]

 

                          Work Phone                [phone]

 

                          Mobile Phone              [phone]

 

                          Email Address             [address]

 

                          Preferred Language        English

 

                          Marital Status            Unknown

 

                          [employer] Affiliation     Unknown

 

                          Race                      Unknown

 

                          Additional Race(s)        Unavailable



                                                    Black or 

 

                          Ethnic Group              Unknown









Author







                          Organization              [facility]

t

 

                          Address                   [address]



                                                    [place] [address]

 

                          Phone                     [phone]









Support







                Name            Relationship    Address         Phone

 

                SABRINA VERDIN              [address]           [phone]



                                                [address] [place] [address] 

 

                SONNY VERDINISTA  [place]              [address] [phone]



                                                [place] [address] 

 

                one else per patient, No Unavailable     Unavailable     Unavail

able

 

                ChitoSonnySabrina  Mother          [address] [phone]

[phone]



                                                [place] [address] 

 

                PATIENT, NO ONE ELSE PER Unavailable     Unavailable     Unavail

able

 

                PHYILLIS        Grandparent     Unavailable     Unavailable

 

                CHITOSONNYSABRINA ELINOR M               [address]    Unavailabl

e



                                                [place] [address] 

 

                Sabrina Bustillo Mother          [address]    [phone]

[phone]



                                                [place] [id number] 

 

                YAZ, SMITA  Grandparent     Unavailable     [phone]

 

                JORDAN VERDIN  F               [address]    Unavailable



                                                [place] [address] 

 

                O'GREG, SMITA LAURA Grandparent     [address]       Unavailable



                                                [place] [address] 

 

                Laura O'Greg, Smita Grandparent     PO       [phone]



                                                [place] [id number] 

 

                Jordan Verdin Sr. Father          [address]    [phone]

[phone]



                                                [place] [address] 

 

                Jordan Verdin  Unavailable     [address] [phone]



                                                [place] [address] 

 

                Cecelia Verdin Unavailable     Unavailable     [phone]

 

                Jordan Verdin Jro Unavailable     [address]

 [phone]



                                                [place] [address] 









Care Team Providers







                    Name                Role                Phone

 

                    Sharpless           Primary Care Physician [phone]

 

                    Domingo Mc       Attending Clinician Unavailable

 

                    SUREKHA HUYNH      Attending Clinician Unavailable

 

                    [facility], Anette MCMAHON Attending Clinician Unavailable

 

                    SCHOENSTEIN, LYNDA  Attending Clinician Unavailable

 

                    Schoenstein MD, Lynda Attending Clinician [phone]

 

                    Mary Anne Ramirez MD          Attending Clinician [phone]

 

                    Doctor Unassigned, No Name Attending Clinician Unavailable

 

                    MARY ANNE RAMIREZ             Attending Clinician Unavailable

 

                    RADHA FOFANA     Attending Clinician Unavailable

 

                    Radha Fofana MD  Attending Clinician [phone]

 

                    Surekha Huynh MD   Attending Clinician [phone]

 

                    NEETA MARIA    Attending Clinician Unavailable

 

                    Neeta Maria NP Attending Clinician [phone]

 

                    Nurse, [facility] Gu Attending Clinician Unavailable

 

                    BOSSMAN VILLA Attending Clinician Unavailable

 

                    Bossman Contreras Attending Clinician [phone]

 

                    Dulce Guzman LVN Attending Clinician [phone]

 

                    DONALD FOX  Attending Clinician Unavailable

 

                    DONALD FOX  Attending Clinician Unavailable

 

                    Yoni Bangura Attending Clinician [phone]

 

                    Noe Phillips MD     Attending Clinician [phone]

 

                    Marcela Allan     Attending Clinician [phone]

 

                    Vikash Anglin MD  Attending Clinician [phone]

 

                    Alondra Dumont Attending Clinician Unavailable

 

                    Adria Bay MD Attending Clinician [phone]

[phone]

 

                    VARINDER SOL     Attending Clinician Unavailable

 

                    Varinder Sol DO  Attending Clinician [phone]

 

                    Roya Sotelo RN    Attending Clinician [phone]

 

                    INOCENCIA GOLDEN  Attending Clinician Unavailable

 

                    Inocencia Golden DO Attending Clinician [phone]

 

                    RAND DONG      Attending Clinician Unavailable

 

                    [place] Dong MD   Attending Clinician [phone]

 

                    Lab, Ang - Db       Attending Clinician Unavailable

 

                    EDWARDO LOPEZ       Attending Clinician Unavailable

 

                    Edwardo Lpoez DO    Attending Clinician [phone]

 

                    ALMA VILLASEÑOR    Attending Clinician Unavailable

 

                    Erum FNP, Alma PAN Attending Clinician [phone]

 

                    SILVINO GARAY    Attending Clinician Unavailable

 

                    Lesley MEADOWS, Silvino GRANADOS Attending Clinician [phone]

 

                    RACHEL BAILEY      Attending Clinician Unavailable

 

                    Marcelle MEADOWS, Rachel   Attending Clinician [phone]

 

                    OGUNLANA, SAPPHIRE A Attending Clinician Unavailable

 

                    Scooter COTTO, Yessica NELSON Attending Clinician Unavailable

 

                    Yoselyn MEADOWS, Jessi  Attending Clinician [phone]

 

                    Ronel COTTO, Stephany ARAUJO Attending Clinician [phone]

 

                    PEDRO SHARPE   Attending Clinician Unavailable

 

                    Thanh JACOBSON, Yo MANRIQUEZ Attending Clinician [phone]

 

                    Jojo MEADOWS, Keenan Olmedo Attending Clinician [phone]

[phone]

 

                    Trish MEADOWS, León [place]   Attending Clinician [phone]

 

                    Alan MEADOWS, Liz G  Attending Clinician [phone]

 

                    Pedro Sharpe MD Attending Clinician [phone]

 

                    Sheridan Delarosa MD Attending Clinician [phone]

 

                    Shahid MEADOWS, Mac ARAUJO   Attending Clinician [phone]

 

                    Vamshi ALEGRE, Sapphire A Attending Clinician [phone]

 

                    SUNIL LU      Attending Clinician Unavailable

 

                    Ashly Greene S  Attending Clinician [phone]

 

                    Sunil Lu MD   Attending Clinician [phone]

 

                    RADHA MEADOWS Attending Clinician Unavailable

 

                    Alondra Santos MD Attending Clinician [phone]

 

                    KEM MEADOWS    Attending Clinician Unavailable

 

                    Kendrick MEADOWS, Kem Attending Clinician [phone]

 

                    Josse Alonso Attending Clinician [phone]

 

                    Octavio JACOBSON, Shelton Attending Clinician [phone]

 

                    DEAN SEGOVIA    Attending Clinician Unavailable

 

                    Tobias Hernandez MD  Attending Clinician [phone]

 

                    Efrain Myles MD Attending Clinician [phone]

 

                    Andre MEADOWS, Martha SMITH  Attending Clinician [phone]

 

                    Dean Segovia MD Attending Clinician Unavailable

 

                    ROMERO FAIRCHILD Attending Clinician Unavailable

 

                    Romero Fairchild Attending Clinician [phone]

 

                    Ap Bernardo RobyDonna Attending Clinician [phone]

 

                    ALLISON ROMEO     Attending Clinician Unavailable

 

                    ALLISON Carver Attending Clinician [phone]

 

                    Only, Ang Db Test   Attending Clinician Unavailable

 

                    Stefano Llanes MD     Attending Clinician [phone]

 

                    STEFANO LLANES        Attending Clinician Unavailable

 

                    MARTY RIVERS    Attending Clinician Unavailable

 

                    Deisy Linares   Attending Clinician [phone]

 

                    Deedee Reinoso   Attending Clinician [phone]

 

                    RAMU TORRES Attending Clinician Unavailable

 

                    BOS[place]AN VILLA Admitting Clinician Unavailable

 

                    DONALD FOX  Admitting Clinician Unavailable

 

                    RAND DONG      Admitting Clinician Unavailable

 

                    [place] Dong MD   Admitting Clinician [phone]

 

                    EDWARDO LOPEZ       Admitting Clinician Unavailable

 

                    Edwardo Lopez DO    Admitting Clinician [phone]

 

                    SILVINO GARAY    Admitting Clinician Unavailable

 

                    RACHEL BAILEY      Admitting Clinician Unavailable

 

                    Rachel Bailey MD   Admitting Clinician [phone]

 

                    LEÓN CHAMBERS      Admitting Clinician Unavailable

 

                    León Chambers MD   Admitting Clinician [phone]

 

                    SUNIL LU      Admitting Clinician Unavailable

 

                    Suinl Lu MD   Admitting Clinician [phone]

 

                    VARINDER SOL     Admitting Clinician Unavailable

 

                    KEM MEADOWS    Admitting Clinician Unavailable

 

                    Kem Meadows MD Admitting Clinician [phone]

 

                    TOBIAS HERNANDEZ     Admitting Clinician Unavailable

 

                    Tobias Hernandez MD  Admitting Clinician [phone]

 

                    DEISY SUTTON Admitting Clinician Unavailable

 

                    Deisy Sutton Admitting Clinician [phone]

 

                    Bernardo De Souza Admitting Clinician [phone]

 

                    ALLISON ROMEO     Admitting Clinician Unavailable

 

                    ALMA VILLASEÑOR    Admitting Clinician Unavailable

 

                    NEETA MARIA    Admitting Clinician Unavailable

 

                    NURIA PEREIRA        Admitting Clinician Unavailable

 

                    Peter De Leon Admitting Clinician [phone]

 

                    Deedee Reinoso   Admitting Clinician [phone]

 

                    RAMU TORRES Admitting Clinician Unavailable









Payers







           Payer Name Policy Type Policy Number Effective Date Expiration Date S

cameron

 

           [employer]            [id number]  2021            



           PLUS                             00:00:00              

 

           [employer]            [id number]  2022            



                                            00:00:00              

 

           [facility]         [id number]                        Common



           (Medicaid)                                             [facility]

 

           [facility]         [id number]                        Common



           (Medicaid)                                             [facility]

 

           [employer]          [id number]                        Common



           (Medicaid)                                             [facility]         [id number]                        Common



           (Medicaid)                                             [facility]

 

           [facility]         [id number]                        Common



           (Medicaid)                                             [facility]







Problems







       Condition Condition Condition Status Onset  Resolution Last   Treating Co

mments 

Source



       Name   Details Category        Date   Date   Treatment Clinician        



                                                 Date                 

 

       Hyperglyce Hyperglyce Disease Active                              U

nivers



       jarvis due to jarvis due to               2-21                               it

y of



       diabetes diabetes               00:00:                             [place]



       mellitus mellitus               00                                 Medica

l



                                                                      Branch

 

       Weakness Weakness Disease Active                              Unive

rs



                                   2-01                               ity of



                                   00:00:                             [place]



                                   00                                 Medical



                                                                      Branch

 

       Lactic Lactic Disease Active 2022                             [facility]



       acidosis acidosis               1-06                               ity of



                                   00:00:                             [place]



                                   00                                 Medical



                                                                      Branch

 

       Nausea and Nausea and Disease Active 2022                             U

nivers



       vomiting, vomiting,               0-21                               ity 

of



       unspecifie unspecifie               00:00:                             [place]

[place]



       d vomiting d vomiting               00                                 Me

dical



       type   type                                                    Branch

 

       Chest pain Chest pain Disease Active                              U

nivers



                                   8-04                               ity of



                                   00:00:                             [place]



                                   00                                 Medical



                                                                      Branch

 

       Foot ulcer Foot ulcer Disease Active                       Overview

: Univers



       with fat with fat               8                        Formattin ity

 of



       layer  layer                00:00:                      g of this [place]



       exposed, exposed,               00                          note   Medica

l



       left   left                                             might be Branch



                                                               different 



                                                               from the 



                                                               original. 



                                                               Added  



                                                               automatic 



                                                               ally from 



                                                               request 



                                                               for    



                                                               surgery 



                                                               [id number] 

 

       Right foot Right foot Disease Active                       Overview

: Univers



       ulcer, ulcer,                                       Formattin ity of



       with fat with fat               00:00:                      g of this Eric

as



       layer  layer                00                          note   Medical



       exposed exposed                                           might be Branch



                                                               different 



                                                               from the 



                                                               original. 



                                                               Added  



                                                               automatic 



                                                               ally from 



                                                               request 



                                                               for    



                                                               surgery 



                                                               [id number] 

 

       Diabetic Diabetic Disease Active                              Unive

rs



       ketoacidos ketoacidos                                              it

y of



       is without is without               00:00:                             Te

[place]



       coma   coma                 00                                 Medical



       associated associated                                                  Br

anch



       with type with type                                                  



       1 diabetes 1 diabetes                                                  



       mellitus mellitus                                                  

 

       Abdominal Abdominal Disease Active                              Uni

vers



       pain,  pain,                624                               ity of



       unspecifie unspecifie               00:00:                             Te

[place]



       d      d                    00                                 Medical



       abdominal abdominal                                                  Bran

ch



       location location                                                  

 

       Hyperglyce Hyperglyce Disease Active                              U

jeferson



       jarvis    jarvis                                                 ity of



                                   00:00:                             [place]



                                   00                                 Medical



                                                                      Branch

 

       Decubitus Decubitus Disease Active                              Uni

vers



       ulcer of ulcer of               6-05                               ity of



       heel,  heel,                00:00:                             [place]



       left,  left,                00                                 Medical



       unstageabl unstageabl                                                  Br

anch



       e      e                                                       

 

       Decubitus Decubitus Disease Active                              Uni

vers



       ulcer of ulcer of               6-05                               ity of



       heel,  heel,                00:00:                             [place]



       left,  left,                00                                 Medical



       unstageabl unstageabl                                                  Br

anch



       e      e                                                       

 

       Diabetic Diabetic Disease Active                              Unive

rs



       ketoacidos ketoacidos               6-05                               it

y of



       is without is without               00:00:                             Te

[place]



       coma   coma                 00                                 Medical



       associated associated                                                  Br

anch



       with type with type                                                  



       2 diabetes 2 diabetes                                                  



       mellitus mellitus                                                  

 

       Decubitus Decubitus Disease Active                              Uni

vers



       ulcer of ulcer of               6-05                               ity of



       heel,  heel,                00:00:                             [place]



       left,  left,                00                                 Medical



       unstageabl unstageabl                                                  Br

anch



       e      e                                                       

 

       Pyuria Pyuria Disease Active                              Univers



                                   6-05                               ity of



                                   00:00:                             [place]



                                   00                                 Medical



                                                                      Branch

 

       Chronic Chronic Disease Active                              Univers



       suprapubic suprapubic               6-                               it

y of



       catheter catheter               00:00:                             [place]



                                   00                                 Medical



                                                                      Branch

 

       Uncontroll Uncontroll Disease Active                              U

jeferson



       ed     ed                   6                               ity of



       diabetes diabetes               00:00:                             [place]



       mellitus mellitus               00                                 Medica

l



       with   with                                                    Branch



       complicati complicati                                                  



       ons    ons                                                     

 

       Spina  Spina  Disease Recurre                              Univers



       bifida bifida        nce    6-03                               ity of



                                   00:00:                             [place]



                                   00                                 Medical



                                                                      Branch

 

       Wound  Wound  Disease Active                              Univers



       infection infection               5-11                               ity 

of



                                   00:00:                             [place]



                                   00                                 Medical



                                                                      Branch

 

       Chills Chills Disease Active                              Univers



                                   5-11                               ity of



                                   00:00:                             [place]



                                   00                                 Medical



                                                                      Branch

 

       POSSIBLE  POSSIBLE Diagnosis Active 2022             

  [facility]



        SHUNT  SHUNT                         21:48:00               l



       MALFUNCTIO MALFUNCTIO               00:00:                             He

goyoann



       N      N Active               00                                 



              2022                                                  



              [facility]                                                  

 

        SHUNT   SHUNT Diagnosis Active 2022             

  [facility]



       MALFUNCTIO MALFUNCTIO               3-25          14:12:00               

l



       N      N Active               00:00:                             Jose



              2022                                 



              [facility]                                                  

 

        SHUNT   SHUNT Diagnosis Active 2022             

  [facility]



       MALFUCTION MALFUCTION               3-24          23:59:00               

l



              Active               00:00:                             Jose



              2022               00                                 



               [facility]                                                  

 

       Complicate Complicate Disease Active                              U

nivers



       d urinary d urinary               1-20                               ity 

of



       tract  tract                00:00:                             [place]



       infection infection               00                                 Medi

sarah



                                                                      Branch

 

       Hyperglyce Hyperglyce Disease Active                              C

[facility]



       jarvis    jarvis                  1-07                               [facility]



       without without               00:00:                             Medical



       ketosis ketosis               00                                 Center

 

       HEADACHE  HEADACHE Diagnosis Active 2018             

  [facility]



              Active                         22:05:00               l



              2018               00:00:                             Miguel malloy



               [address]                                                  

 

       SHUNT   SHUNT Diagnosis Active 2018               Mem

oria



       MALFUNCTIO MALFUNCTIO                         20:00:00               

l



       N      N Active               00:00:                             Jose



              2018               00                                 



               [facility]                                                  

 

       ACUTE    ACUTE Diagnosis Active 2018               Me

moria



       HEADACHE HEADACHE                         09:20:00               l



              Active               00:00:                             Jose



              2018                                 



              [facility]                                                  

 

       Spina  Spina  Disease Recurre                              [facility]



       bifida bifida        nce    6-12                               [facility]



                                   00:00:                             [facility]



                                   00                                 Center

 

       Pyelonephr Pyelonephr Disease Active                              C

[facility]



       itis   itis                 6-11                               [facility]



                                   00:00:                             Medical



                                   [address] [place] [address]

 

       Morbid Morbid Disease Active                              [facility]



       obesity obesity               1-04                               ity of



       with body with body               00:00:                             [place]

[place]



       mass index mass index               00                                 Me

dical



       of     of                                                      Branch



       40.0-49.9 40.0-49.9                                                  

 

       Spina   Spina Problem                      2019               Memor

ia



       bifida, bifida,                             14:14:02               l



       unspecifie unspecifie                                                  He

rmann



       d      d                                                       



              2019                                                  



              [facility]                                                  

 

       Nausea  Nausea Problem                      2019               [facility]

rajeev



       with   with                               14:14:02               l



       vomiting, vomiting,                                                  Herm

nguyen



       unspecifie unspecifie                                                  



       d      d                                                       



              2019                                                  



               [facility]                                                  

 

       Diplopia  Diplopia Problem                      2019               

[facility]



              2019                             14:14:02               l



              [facility]                                                  

 

       Cerebral  Cerebral Problem                      2019               

[facility]



       palsy, palsy,                             14:14:02               l



       unspecifie unspecifie                                                  He

rmann



       d      d                                                       



              2019                                                  



              [facility]                                                  

 

       Acquired  Acquired Problem                      2019               

[facility]



       absence of absence of                             14:14:02               

l



       other  other                                                   [facility]



       specified specified                                                  



       parts of parts of                                                  



       digestive digestive                                                  



       tract  tract                                                   



              2019                                                  



              [facility]                                                  

 

       Nicotine   Nicotine Problem                      2019              

 [facility]



       dependence dependence                             14:14:02               

l



       ,      ,                                                       [facility]



       cigarettes cigarettes                                                  



       ,      ,                                                       



       uncomplica uncomplica                                                  



       huma    huma                                                     



              2019                                                  



               [facility]                                                  

 

       Presence  Presence Problem                      2019               

[facility]



       of     of                                 14:14:02               l



       cerebrospi cerebrospi                                                  He

rmann



       nal fluid nal fluid                                                  



       drainage drainage                                                  



       device device                                                  



              2019                                                  



              [facility]                                                  

 

       Allergy  Allergy Problem                      2019               Me

moria



       status to status to                             14:14:02               l



       other  other                                                   [facility]



       antibiotic antibiotic                                                  



       agents agents                                                  



       status status                                                  



              2019                                                  



              [facility]                                                  

 

       Allergy  Allergy Problem                      2019               Me

moria



       status to status to                             14:14:02               l



       other  other                                                   [facility]



       drugs, drugs,                                                  



       medicament medicament                                                  



       s and  s and                                                   



       biological biological                                                  



       substances substances                                                  



       status status                                                  



              2019                                                  



              [facility]                                                  

 

       Allergy  Allergy Problem                      2019               Me

moria



       status to status to                             14:14:02               l



       narcotic narcotic                                                  Miguel

n



       agent  agent                                                   



       status status                                                  



              2019                                                  



              [facility]                                                  

 

       Asthma  Asthma Problem Resolve               2022-04-15               Mem

oria



       (disorder) (disorder)        d                    23:48:47               

l



              Resolved                                                  [facility]



              Problem                                                  



              04/15/2022                                                  



              [facility]                                                  

 

       Bronchitis  Bronchiti Problem Resolve               2022-04-15           

    [facility]



       (disorder) s             d                    23:48:47               l



              (disorder)                                                  Miguel

n



              Resolved                                                  



              Problem                                                  



              04/15/2022                                                  



               [facility]                                                  

 

       Cerebral  Cerebral Problem Resolve               2022-04-15              

 [facility]



       palsy  palsy         d                    23:48:47               l



       (disorder) (disorder)                                                  He

rmann



              Resolved                                                  



              Problem                                                  



              04/15/2022                                                  



              [facility]                                                  

 

       Hydrocepha  Hydroceph Problem Resolve               2022-04-15           

    [facility]



       ozzy    alus          d                    23:48:47               l



       (disorder) (disorder)                                                  He

rmann



               Resolved                                                  



              Problem                                                  



              04/15/2022                                                  



              [facility]                                                  

 

       Osteomyeli  Osteomyel Problem Resolve               2022-04-15           

    [facility]



       tis    itis          d                    23:48:47               l



       (disorder) (disorder)                                                  He

rmann



              Resolved                                                  



              Problem                                                  



              04/15/2022                                                  



              [facility]                                                  

 

       Acute pain  Acute Problem Active               2022-04-15               M

emoria



       (finding) pain                               23:48:47               l



              (finding)                                                  Jose



              Active                                                  



              Problem                                                  



              04/15/2022                                                  



              [facility]                                                  

 

       Morbid  Morbid Problem Active               2022-04-15               [facility]

rajeev



       obesity obesity                             23:48:47               l



       (disorder) (disorder)                                                  He

rmann



              Active                                                  



              Problem                                                  



              04/15/2022                                                  



               [facility]                                                  

 

       Providenci  Providenc Problem Active               2022-04-15            

   [facility]



       a      ia                                 23:48:47               l



       (organism) (organism)                                                  He

rmann



              Active                                                  



              Problem                                                  



              04/15/2022                                                  



              Problem                                                  



              added by                                                  



              Discern                                                  



              Expert.                                                  



              [facility]                                                  

 

       Lumbar        Problem Active                                    Common



       spina                                                          [facility]



       bifida                                                         - [facility]



       [facility]

 

       Dependence        Problem Active                                    Commo

n



       on                                                             [facility]



       wheelchair                                                         - [facility]



                                                                      [facility]

 

       Paraplegia        Problem Active                                    Commo

n



                                                                      [facility]



                                                                      - [facility]



                                                                      [facility]

 

       Constipati        Problem Active                                    Commo

n



       on                                                             [facility]



                                                                      - [facility]



                                                                      [facility]

 

       Incontinen        Problem Active                                    Commo

n



       ce of                                                          [facility]



       urine                                                          - [facility]



                                                                      [facility]

 

       Nausea        Problem Active                                    Common



                                                                      [facility]



                                                                      - [facility]



                                                                      [facility]

 

       Mixed         Problem Active                                    Common



       anxiety                                                         [facility]



       and                                                            - [facility]



       depressive                                                         [facility]

 

       [id number]        Problem Active                                    Common



                                                                      [facility]



                                                                      - [facility]

 

       Bowel         Problem Active                                    Common



       incontinen                                                         [facility]



       ce                                                             [facility]

 

       [id number]        Problem Active                                    Common



                                                                      [facility]



                                                                      - [facility]

 

       [id number]        Problem Active                                    Common



                                                                      [facility]



                                                                      [facility]

 

       [id number]        Problem Active                                    Common



                                                                      [facility]

 

       [id number]        Problem Active                                    Commo

n



       [id number]                                                         [facility]

 

       Foot ulcer        Problem Active                                    Commo

n



                                                                      [facility]

 

       Myelocele        Problem Active                                    Common



       with                                                           [facility]



       hydrocepha                                                         - [facility]

 

       [id number]        Problem Active                                    Common



                                                                      [facility]



                                                                      - [facility]

 

       [id number]        Problem Active                                    Common



                                                                      [facility]



                                                                       [facility]



                                                                      [facility]

 

       Nicotine        Problem Active                                    Common



       dependence                                                         [facility]

 

       [id number]        Problem Active                                    Common



                                                                      [facility]

 

       [id number]        Problem Active                                    Common



                                                                      [facility]

 

       [id number]        Problem Active                                    Common



                                                                      [facility]

 

       [id number]        Problem Active                                    Common



                                                                      [facility]







History of Past Illness







       Condition Condition Condition Status Onset  Resolution Last   Treating Co

mments 

Source



       Name   Details Category        Date   Date   Treatment Clinician        



                                                 Date                 

 

       Headache  Headache Problem        2019           

    [facility]



              2018   14:14:02 14:14:02               l



              2019               06:00:                             Miguel malloy



              [address]                                                  







Allergies, Adverse Reactions, Alerts







       Allergy Allergy Status Severity Reaction(s) Onset  Inactive Treating Comm

ents 

Source



       Name   Type                        Date   Date   Clinician        

 

       Amoxicil Propensi Active        Hives                        Univer

s



       carine    ty to                       7                        ity of



              adverse                      00:00:                      [place]



              reaction                      00                          [facility]

 

       AMOXICIL DRUG   Active        Hives                        [facility]



       CARINE    INGREDI                      7                        ity of



                                          00:00:                      [place]



                                          00                          [facility]

 

       Metoclop Propensi Active        Nausea 2017                      Univer

s



       ramide ty to                and/or 2-20                        ity of



       Hcl    adverse               Vomiting 00:00:                      [place]



              reaction                      00                          [facility]

 

       METOCLOP DRUG   Active        N/V    2017                      [facility]



       RAMIDE INGREDI                      2-20                        ity of



       HCL                                00:00:                      [place]



                                          00                          [facility]

 

       SESAME DRUG   Active        Hives                        [facility]



       SEED   INGREDI                      6-11                        ity of



                                          00:00:                      [place]



                                          00                          [facility]

 

       Sesame Propensi Active        Hives                        [facility]



       Seed   ty to                       6-11                        ity of



              adverse                      00:00:                      [place]



              reaction                      00                          Medical



              s                                                       Branch

 

       Sulfamet Propensi Active        Hives  2017-0                      [facility]



       hoxazole ty to                       6-11                        [facility]



       [facility] adverse                      00:00:                      Medical



       oprim  reaction                      00                          Center



              s                                                       

 

       Levoflox Propensi Active        Hives  2017-0                      [facility]



       acin   ty to                       [facility]-11                        [facility]



              adverse                      00:00:                      Medical



              reaction                      00                          Center



              s                                                       

 

       SULFAMET Allergy Active High   Hives  2017-                      [facility]



       HOXAZOLE                             6-11                        [facility]



       -[facility]                             00:00:                      Medical



       OPRIM                              00                          Center

 

       LEVOFLOX Allergy Active High   Hives  2017-0                      [facility]



       ACIN                               6-11                        [facility]



                                          00:00:                      Medical



                                          00                          Center

 

       MORPHINE Allergy Active High   Hives  2017-0                      [facility]



                                          6-11                        [facility]



                                          00[facility]                      [facility]



                                          00                          Center

 

       KETOROLA Allergy Active High   Rash   2017-0                      [facility]



       C                                  6-11                        [facility]



                                          00:00:                      Medical



                                          00                          Center

 

       VANCOMYC Allergy Active High   Rash   2017-                      [facility]



       IN                                 6-11                        [facility]



       ANALOGUE                             00:00:                      Medical



       S                                  00                          Center

 

       SESAME Allergy Active        Hives  2017-                      [facility]



       SEED                               6-11                        [facility]



                                          00:00:                      Medical



                                          00                          Center

 

       Morphine Propensi Active        Hives  2017-0                      [facility]



              ty to                       611                        [facility]



              adverse                      00:00:                      Medical



              reaction                      00                          Center



              s                                                       

 

       ONDANSET Allergy Active        N\T\V  -                      [facility]



       EVIN HCL                             6-11                        [facility]



       (PF)                               00:00:                      Medical



                                          00                          Center

 

       Ketorola Propensi Active        Rash   2017-                      [facility]



       c      ty to                       611                        [facility]



              adverse                      00:00:                      Medical



              reaction                      00                          Center



              s                                                       

 

       Vancomyc Propensi Active        Rash   2017-                      [facility]



       in     ty to                       611                        [facility]



       Analogue adverse                      00:00:                      Medical



       s      reaction                      00                          Center



              s                                                       

 

       Ondanset Propensi Active        Nausea And 2017-0                      [facility]



       evin Hcl ty to                Vomiting 6-11                        [facility]



       (Pf)   adverse                      00:00:                      Medical



              reaction                      00                          Center



              s                                                       

 

       Sulfa  Propensi Active        Other - See 2017-                      Uni

vers



       (Sulfona ty to                comments 1-04                        ity of



       mide   adverse                      00:00:                      [place]



       Antibiot reaction                      00                          Medica

l



       ics)   s                                                       Branch

 

       Sulfamet Propensi Active        Other - See 2017-0                      U

nivers



       hoprim ty to                comments 1-04                        ity of



       Ds     adverse                      00:00:                      [place]



              reaction                      00                          Medical



              s                                                       Branch

 

       Vancomyc Propensi Active        Other - See                       U

nivers



       in     ty to                comments                         ity of



              adverse                      00:00:                      [place]



              reaction                      00                          Medical



              s                                                       Branch

 

       Sulfa  Propensi Active        Other - See                       Uni

vers



       (Sulfona ty to                comments 1                        ity of



       mide   adverse                      00:00:                      [place]



       Antibiot reaction                      00                          Medica

l



       ics)   s                                                       Branch

 

       SULFA  Drug   Active        Other-Cmnt                       Univer

s



       (SULFONA Class                       1-                        ity of



       MIDE                               00:00:                      [place]



       ANTIBIOT                             00                          Medical



       ICS)                                                           Branch

 

       SULFAMET DRUG   Active        Other-Cmnt                       Univ

ers



       HOPRIM                                                     ity of



       DS                                 00:00:                      [place]



                                          00                          Medical



                                                                      Branch

 

       VANCOMYC DRUG   Active        Other-Cmnt                       Univ

ers



       IN     INGREDI                                              ity of



                                          00:00:                      [place]



                                          00                          Medical



                                                                      Branch

 

       Sulfamet Propensi Active        Rash                         Univer

s



       hoxazole ty to                       7-19                        ity of



              adverse                      00:00:                      [place]



              reaction                      00                          Medical



              s                                                       Branch

 

       Ondanset Propensi Active        Nausea                       Univer

s



       evin Hcl ty to                and/or 719                        ity of



       (Pf)   adverse               Vomiting 00:00:                      [place]



              reaction                      00                          Medical



              s                                                       Branch

 

       SULFAMET DRUG   Active        Rash                         Univers



       HOXAZOLE INGREDI                      7-19                        ity of



                                          00:00:                      [place]



                                          00                          Medical



                                                                      Branch

 

       ONDANSET DRUG   Active        N/V    0                      Univers



       EVIN HCL                             7-19                        ity of



       (PF)                               00:00:                      [place]



                                          00                          Medical



                                                                      Branch

 

       Levoflox Propensi Active        Hives                        Univer

s



       acin   ty to                       5-23                        ity of



              adverse                      00:00:                      [place]



              reaction                      00                          Medical



              s                                                       Branch

 

       Morphine Propensi Active        Hives  0                      Univer

s



              ty to                       5-23                        ity of



              adverse                      00:00:                      [place]



              reaction                      00                          Medical



              s                                                       Branch

 

       Ketorola Propensi Active        Nausea 0                      Univer

s



       c      ty to                and/or 5-                        ity of



       Trometha adverse               Vomiting 00:00:                      [place]



       mine   reaction                      00                          Medical



              s                                                       Branch

 

       LEVOFLOX DRUG   Active        Hives                        Univers



       ACIN   INGREDI                      5-23                        ity of



                                          00:00:                      [place]



                                          00                          Medical



                                                                      Branch

 

       MORPHINE DRUG   Active        Hives                        Univers



              INGREDI                      5-23                        ity of



                                          00:00:                      [place]



                                          00                          Medical



                                                                      Branch

 

       KETOROLA DRUG   Active        N/V    0                      Univers



       C      INGREDI                      5-23                        ity of



       TROMETHA                             00:00:                      [facility]                               00                          Medical



                                                                      Branch

 

       morphine Drug   Active                                           Common



              allergy                                                  [facility]

 

       ondanset Drug   Active                                           Common



       evin    allergy                                                  [facility]

 

       [address]  Drug   Active                                           Common



              allergy                                                  [facility]

 

       amoxicil amoxicil Active                                           Mem[facility]

a



       carine    carine                                                     l



                                                                      [facility]

 

       morphine morphine Active                                           [facility]

a



                                                                      l



                                                                      [facility]

 

       Toradol Toradol Active                                           [facility]



                                                                      l



                                                                      [facility]

 

       Minocin Minocin Active                                           [facility]



                                                                      l



                                                                      [facility]

 

       Zofran Zofran Active                                           [facility]



                                                                      l



                                                                      [facility]

 

       Levaquin Levaquin Active                                           Mem[facility]

a



                                                                      l



                                                                      [facility]

 

       Bactrim Bactrim Active                                           [facility]



                                                                      l



                                                                      [facility]

 

       Reglan Reglan Active                                           [facility]



                                                                      l



                                                                      Jose







Family History







           Family Member Diagnosis  Comments   Start Date Stop Date  Source

 

           Natural father Diabetes                                    [facility]

 

           Natural mother No Significant                                  Univer

sity of [place]



                      Medical Problems                                  Medical 

Branch







Social History







           Social Habit Start Date Stop Date  Quantity   Comments   Source

 

           History of tobacco                       Cigarette Smoker            

[facility]



                                                                  Branch

 

           History SDOH Social                                             Unive

rsity of



           [facility]ctions Get                                             [place] [facility]

ical



           Together                                               Branch

 

           History SDOH Social                                             Unive

rsity of



           [facility] 

Medical



                                                                  Branch

 

           History SDOH Social                                             Unive

rsity of



           [facility]ctions                                             [place] [facility]



           Membership                                             Branch

 

           History SDOH Social                                             Unive

rsity of



           [facility]ctions                                             [place] [facility]



           Meetings                                               Branch

 

           Sex Assigned At                                             [facility]

romeo -



           Birth                                                  [facility]

 

           Exposure to 2023 Not sure              [facility]



           SARS-CoV-2 (event) 00:00:00   14:24:00                         [facility]



                                                                  Branch

 

           Alcohol intake 2023 Current drinker            Unive

rsity of



                      00:00:00   00:00:00   of alcohol            [place] [facility]



                                            (finding)             Branch

 

           History SDOH 2023 1                     University o

f



           Alcohol Frequency 00:00:00   00:00:00                         [place] [facility]

edical



                                                                  Branch

 

           History SDOH 2023 0                     University o

f



           Alcohol Std Drinks 00:00:00   00:00:00                         [place] 

Medical



                                                                  Branch

 

           History SDOH 2023 1                     University o

f



           Alcohol Binge 00:00:00   00:00:00                         [place] [facility]

al



                                                                  Branch

 

           History SDOH Social 2023 4                     Unive

rsity of



           Connections Phone 00:00:00   00:00:00                         [place] [facility]

edical



                                                                  Branch

 

           History SDOH Social 2023 7                     Unive

rsity of



           Connections Living 00:00:00   00:00:00                         [place] 

Medical



                                                                  Branch

 

           History SDOH 2023 0                     University o

f



           Physical Activity 00:00:00   00:00:00                         [facility]

edical



           DPW                                                    Branch

 

           History SDOH 2023 0                     University o

f



           Physical Activity 00:00:00   00:00:00                         [facility]

edical



           MPS                                                    Branch

 

           History SDOH 2023 5                     University o

f



           Financial  00:00:00   00:00:00                         [place] [facility]



                                                                  Branch

 

           History SDOH Food 2023 1                     Univers

ity of



           Scarcity   00:00:00   00:00:00                         [place] [facility]



                                                                  Branch

 

           History SDOH 2023 2                     University o

f



           Transport Med 00:00:00   00:00:00                         [place] [facility]

al



                                                                  Branch

 

           History SDOH 2023 2                     University o

f



           Transport Non-Med 00:00:00   00:00:00                         [facility]

edical



                                                                  Branch

 

           History SDOH Food 2023 1                     Univers

ity of



           Worry      00:00:00   00:00:00                         [facility]

 

           Education  2022 21                    University of



                      00:00:00   00:00:00                         [facility]

 

           Tobacco use and 2022-10-21 2022-10-21 Smokeless             Universit

y of



           exposure   00:00:00   00:00:00   tobacco non-user            [place] [facility]

dical



                                                                  Branch

 

           Alcohol Comment 2022 once a year            Universi

ty of



                      00:00:00   00:00:00                         [facility]

 

           Social History 2022                       [facility]

[place]ar



                      05:57:00   05:57:00                         

 

           Cigarettes smoked 2017                       [facility] 

[facility]



           current (pack per 00:00:00   00:00:00                         Medical

 Center



           day) - Reported                                             









                Smoking Status  Start Date      Stop Date       Source

 

                Social History  2018 11:26:10                 [facility]

[facility]







Medications







       Ordered Filled Start  Stop   Current Ordering Indication Dosage Frequency

 Signature

                    Comments            Components          Source



     Medication Medication Date Date Medication? Clinician                (SIG) 

          



     Name Name                                                   

 

     Blood-Gluco      -0      Yes                      Use as           Univ

ers



     se Meter      5-04                               directed           ity of



     (TRUE      00:00:                               to check           [place]



     METRIX      00                                 blood           Medical



     GLUCOSE                                         sugars           Branch



     METER) Misc                                         twice           



                                                  daily for           



                                                  DX E11.65           

 

     blood sugar      -0      Yes                      Use as           Univ

ers



     diagnostic      5-04                               directed           ity o

f



     (TRUE      00:00:                               to check           [place]



     METRIX      00                                 blood           Medical



     GLUCOSE                                         sugars           Branch



     TEST STRIP)                                         twice           



     strip                                         daily DX           



                                                  E11.65           

 

     lancets      0      Yes                      Use as           Univers



     (TRUEPLUS      5-04                               directed           ity of



     LANCETS) 30      00:00:                               to check           [place]

[place]



     gauge Misc      00                                 blood           Medical



                                                  sugars DX           Branch



                                                  E11.65           

 

     Blood-Gluco      -0      Yes                      Use as           Univ

ers



     se Meter      5-04                               directed           ity of



     (TRUE      00:00:                               to check           [place]



     METRIX      00                                 blood           Medical



     GLUCOSE                                         sugars           Branch



     METER) Misc                                         twice           



                                                  daily for           



                                                  DX E11.65           

 

     blood sugar      -0      Yes                      Use as           Univ

ers



     diagnostic      -04                               directed           ity o

f



     (TRUE      00:00:                               to check           [place]



     METRIX      00                                 blood           Medical



     GLUCOSE                                         sugars           Branch



     TEST STRIP)                                         twice           



     strip                                         daily DX           



                                                  E11.65           

 

     lancets      0      Yes                      Use as           Univers



     (TRUEPLUS      -04                               directed           ity of



     LANCETS) 30      00:00:                               to check           Te

[place]



     gauge Misc      00                                 blood           Medical



                                                  sugars DX           Branch



                                                  E11.65           

 

     Blood-Gluco      -0      Yes                      Use as           Univ

ers



     se Meter      -04                               directed           ity of



     (TRUE      00:00:                               to check           [place]



     METRIX      00                                 blood           Medical



     GLUCOSE                                         sugars           [facility]



     METER) Misc                                         twice           



                                                  daily for           



                                                  DX E11.65           

 

     blood sugar      -0      Yes                      Use as           [facility]

ers



     diagnostic      5-04                               directed           ity o

f



     (TRUE      00:00:                               to check           [place]



     METRIX      00                                 blood           Medical



     GLUCOSE                                         sugars           [facility]



     TEST STRIP)                                         twice           



     strip                                         daily DX           



                                                  E11.65           

 

     lancets      -0      Yes                      Use as           [facility]



     (TRUEPLUS      5-04                               directed           [facility]



     LANCETS) 30      00:00:                               to check           [facility]

[place]



     gauge Misc      00                                 blood           Medical



                                                  sugars DX           [facility]



                                                  E11.65           

 

     Blood-Gluco      -0      Yes                      Use as           [facility]

ers



     se Meter      5-04                               directed           ity [facility]



     (TRUE      00:00:                               to check           [place]



     METRIX      00                                 blood           Medical



     GLUCOSE                                         sugars           [facility]



     METER) Misc                                         twice           



                                                  daily for           



                                                  DX E11.65           

 

     blood sugar      -0      Yes                      Use as           Un[facility]

ers



     diagnostic      5-04                               directed           ity o

f



     (TRUE      00:00:                               to check           [place]



     METRIX      00                                 blood           Medical



     GLUCOSE                                         sugars           [facility]



     TEST STRIP)                                         twice           



     strip                                         daily DX           



                                                  E11.65           

 

     lancets            Yes                      Use as           [facility]



     (TRUEPLUS                                     directed           [facility]



     LANCETS) 30      00:00:                               to check           [facility]

[place]



     gauge Misc      00                                 blood           Medical



                                                  sugars DX           [facility]



                                                  E11.65           

 

     Blood-Gluco            Yes                      Use as           [facility]

ers



     se Meter                                     directed           ity [facility]



     (TRUE      00:00:                               to check           [place]



     METRIX      00                                 blood           Medical



     GLUCOSE                                         sugars           [facility]



     METER) Misc                                         twice           



                                                  daily for           



                                                  DX E11.65           

 

     blood sugar            Yes                      Use as           [facility]

ers



     diagnostic                                     directed           ity o

f



     (TRUE      00:00:                               to check           [place]



     METRIX      00                                 blood           Medical



     GLUCOSE                                         sugars           [facility]



     TEST STRIP)                                         twice           



     strip                                         daily DX           



                                                  E11.65           

 

     lancets            Yes                      Use as           [facility]



     (TRUEPLUS                                     directed           ity of



     LANCETS) 30      00:00:                               to check           [facility]

[place]



     gauge Misc      00                                 blood           Medical



                                                  sugars DX           [facility]



                                                  E11.65           

 

     FENTanyl PF      2023- No             12.5ug      12.5 mcg,         

  [facility]



     (SUBLIMAZE                                Slow IV           ity o

f



     (PF))      23:15: 23:38                          Push,           [place]



     injection      00   :00                           ONCE, 1           Medical



     12.5 mcg                                         dose, On           [facility]



                                                  23 at           



                                                  1815, STAT           

 

     NaCl 0.9%      2023- No             1000mL      at 999           [facility]

[facility]



     (NS) bolus                                mL/hr,           ity of



     infusion      21:00: 23:29                          1,000 mL,           [facility]

as



     1,000 mL      00   :00                           IV             Medical



                                                  Infusion,           [facility]



                                                  ONCE, 1           



                                                  dose, On           



                                                  23 at           



                                                  1600, ASAP           

 

     FENTanyl PF      2023- No             25ug      25 mcg,           Un

[facility]



     (SUBLIMAZE                                Slow IV           ity o

f



     (PF))      20:15: 20:34                          Push,           [place]



     injection      00   :00                           ONCE, 1           Medical



     25 mcg                                         dose, On           [facility]



                                                  Fri            



                                                  23 at           



                                                  1515, STAT           

 

     blood sugar            Yes                      Use as           Un[facility]

ers



     diagnostic                                     directed           ity o

f



     (ONETOUCH      00:00:                                              [place]



     VERIO TEST      00                                                Medical



     STRIPS)                                                        [facility]



     strip                                                        

 

     insulin      2023-0      Yes            60U       inject 60           Un[facility]

rs



     lispro,      4-25                               Units           ity of



     human,      00:00:                               under the           [place]



     (HUMALOG      00                                 skin in           Medical



     U-100                                         the            [facility]



     INSULIN)                                         morning           



     100 unit/mL                                         and 60           



     injection                                         Units in           



                                                  the            



                                                  evening.           



                                                  inject           



                                                  with           



                                                  meals.           

 

     blood sugar            Yes                      Use as           [facility]

ers



     diagnostic      4-25                               directed           ity o

f



     (ONETOUCH      00:00:                                              [place]



     VERIO TEST      00                                                Medical



     STRIPS)                                                        [facility]



     strip                                                        

 

     insulin            Yes            60U       inject 60           Unive

rs



     lispro,      4-25                               Units           ity of



     human,      00:00:                               under the           [place]



     (HUMALOG      00                                 skin in           Medical



     U-100                                         the            [facility]



     INSULIN)                                         morning           



     100 unit/mL                                         and 60           



     injection                                         Units in           



                                                  the            



                                                  evening.           



                                                  inject           



                                                  with           



                                                  meals.           

 

     blood sugar            Yes                      Use as           Univ

ers



     diagnostic      4-25                               directed           ity o

f



     (ONETOUCH      00:00:                                              [place]



     VERIO TEST      00                                                Medical



     STRIPS)                                                        [facility]



     strip                                                        

 

     insulin            Yes            60U       inject 60           Unive

rs



     lispro,      4-25                               Units           ity of



     human,      00:00:                               under the           [place]



     (HUMALOG      00                                 skin in           Medical



     U-100                                         the            [facility]



     INSULIN)                                         morning           



     100 unit/mL                                         and 60           



     injection                                         Units in           



                                                  the            



                                                  evening.           



                                                  inject           



                                                  with           



                                                  meals.           

 

     blood sugar            Yes                      Use as           Un[facility]

ers



     diagnostic      4-25                               directed           ity o

f



     (ONETOUCH      00:00:                                              [place]



     VERIO TEST      00                                                Medical



     STRIPS)                                                        [facility]



     strip                                                        

 

     insulin            Yes            60U       inject 60           Unive

rs



     lispro,      4-25                               Units           ity of



     human,      00:00:                               under the           [place]



     (HUMALOG      00                                 skin in           Medical



     U-100                                         the            [facility]



     INSULIN)                                         morning           



     100 unit/mL                                         and 60           



     injection                                         Units in           



                                                  the            



                                                  evening.           



                                                  inject           



                                                  with           



                                                  meals.           

 

     blood sugar            Yes                      Use as           Un[facility]

ers



     diagnostic      4-25                               directed           ity o

f



     (ONETOUCH      00:00:                                              [place]



     VERIO TEST      00                                                Medical



     STRIPS)                                                        [facility]



     strip                                                        

 

     insulin      0      Yes            60U       inject 60           Un[facility]

rs



     lispro,      4-25                               Units           ity of



     human,      00:00:                               under the           [place]



     (HUMALOG      00                                 skin in           Medical



     U-100                                         the            [facility]



     INSULIN)                                         morning           



     100 unit/mL                                         and 60           



     injection                                         Units in           



                                                  the            



                                                  evening.           



                                                  inject           



                                                  with           



                                                  meals.           

 

     insulin            Yes            60U       inject 60           Unive

rs



     lispro,      4-25                               Units           ity of



     human,      00:00:                               under the           [place]



     (HUMALOG      00                                 skin in           Medical



     U-100                                         the            [facility]



     INSULIN)                                         morning           



     100 unit/mL                                         and 60           



     injection                                         Units in           



                                                  the            



                                                  evening.           



                                                  inject           



                                                  with           



                                                  meals.           

 

     insulin            Yes            60U       inject 60           Un[facility]

rs



     lispro,      4-25                               Units           ity of



     human,      00:00:                               under the           [place]



     (HUMALOG      00                                 skin in           Medical



     U-100                                         the            [facility]



     INSULIN)                                         morning           



     100 unit/mL                                         and 60           



     injection                                         Units in           



                                                  the            



                                                  evening.           



                                                  inject           



                                                  with           



                                                  meals.           

 

     insulin            Yes            60U       inject 60           Un[facility]

rs



     lispro,      4-25                               Units           ity of



     human,      00:00:                               under the           [place]



     (HUMALOG      00                                 skin in           Medical



     U-100                                         the            [facility]



     INSULIN)                                         morning           



     100 unit/mL                                         and 60           



     injection                                         Units in           



                                                  the            



                                                  evening.           



                                                  inject           



                                                  with           



                                                  meals.           

 

     insulin            Yes            60U       inject 60           Unive

rs



     lispro,      4-25                               Units           ity of



     human,      00:00:                               under the           [place]



     (HUMALOG      00                                 skin in           Medical



     U-100                                         the            [facility]



     INSULIN)                                         morning           



     100 unit/mL                                         and 60           



     injection                                         Units in           



                                                  the            



                                                  evening.           



                                                  inject           



                                                  with           



                                                  meals.           

 

     insulin            Yes            60U       inject 60           Unive

rs



     lispro,      4-25                               Units           ity of



     human,      00:00:                               under the           [place]



     (HUMALOG      00                                 skin in           Medical



     U-100                                         the            [facility]



     INSULIN)                                         morning           



     100 unit/mL                                         and 60           



     injection                                         Units in           



                                                  the            



                                                  evening.           



                                                  inject           



                                                  with           



                                                  meals.           

 

     semaglutide      2023- Yes                      inject           Uni

vers



     (OZEMPIC)      -19                          0.25 mg           ity [facility]



     0.25 mg or      00:00: 04:59                          under the           [facility]

[facility]



     0.5 mg (2      00   :00                           skin           Medical



     mg/3 mL)                                         weekly for           [facility]

h



     PnIj                                         28 days,           



                                                  THEN 0.5           



                                                  mg weekly           



                                                  for 56           



                                                  days.           

 

     semaglutide      2023- Yes                      inject           Uni

vers



     (OZEMPIC)      -19                          0.25 mg           ity of



     0.25 mg or      00:00: 04:59                          under the           [facility]

[facility]



     0.5 mg (2      00   :00                           skin           Medical



     mg/3 mL)                                         weekly for           [facility]

h



     PnIj                                         28 days,           



                                                  THEN 0.5           



                                                  mg weekly           



                                                  for 56           



                                                  days.           

 

     semaglutide      2023- Yes                      inject           [facility]i

vers



     (OZEMPIC)      -19                          0.25 mg           ity of



     0.25 mg or      00:00: 04:59                          under the           [facility]

[facility]



     0.5 mg (2      00   :00                           skin           Medical



     mg/3 mL)                                         weekly for           [facility]

h



     PnIj                                         28 days,           



                                                  THEN 0.5           



                                                  mg weekly           



                                                  for 56           



                                                  days.           

 

     semaglutide      2023- Yes                      inject           Uni

vers



     (OZEMPIC)      -19                          0.25 mg           ity of



     0.25 mg or      00:00: 04:59                          under the           [facility]

[facility]



     0.5 mg (2      00   :00                           skin           Medical



     mg/3 mL)                                         weekly for           [facility]

h



     PnIj                                         28 days,           



                                                  THEN 0.5           



                                                  mg weekly           



                                                  for 56           



                                                  days.           

 

     semaglutide      2023- Yes                      inject           [facility]i

vers



     (OZEMPIC)      -19                          0.25 mg           ity of



     0.25 mg or      00:00: 04:59                          under the           [facility]

[facility]



     0.5 mg (2      00   :00                           skin           Medical



     mg/3 mL)                                         weekly for           [facility]

h



     PnIj                                         28 days,           



                                                  THEN 0.5           



                                                  mg weekly           



                                                  for 56           



                                                  days.           

 

     semaglutide      2023- Yes                      inject           [facility]i

vers



     (OZEMPIC)      -19                          0.25 mg           ity of



     0.25 mg or      00:00: 04:59                          under the           [facility]

[facility]



     0.5 mg (2      00   :00                           skin           Medical



     mg/3 mL)                                         weekly for           [facility]

h



     PnIj                                         28 days,           



                                                  THEN 0.5           



                                                  mg weekly           



                                                  for 56           



                                                  days.           

 

     semaglutide      2023- Yes                      inject           Uni

vers



     (OZEMPIC)      -                          0.25 mg           ity of



     0.25 mg or      00:00: 04:59                          under the           [facility]

[facility]



     0.5 mg (2      00   :00                           skin           Medical



     mg/3 mL)                                         weekly for           [facility]

h



     PnIj                                         28 days,           



                                                  THEN 0.5           



                                                  mg weekly           



                                                  for 56           



                                                  days.           

 

     semaglutide      2023- Yes                      inject           Uni

vers



     (OZEMPIC)      --19                          0.25 mg           ity of



     0.25 mg or      00:00: 04:59                          under the           [facility]

[facility]



     0.5 mg (2      00   :00                           skin           Medical



     mg/3 mL)                                         weekly for           [facility]

h



     PnIj                                         28 days,           



                                                  THEN 0.5           



                                                  mg weekly           



                                                  for 56           



                                                  days.           

 

     semaglutide      2023- Yes                      inject           Un[facility]

vers



     (OZEMPIC)      --19                          0.25 mg           ity of



     0.25 mg or      00:00: 04:59                          under the           [facility]

[facility]



     0.5 mg (2      00   :00                           skin           Medical



     mg/3 mL)                                         weekly for           [facility]

h



     PnIj                                         28 days,           



                                                  THEN 0.5           



                                                  mg weekly           



                                                  for 56           



                                                  days.           

 

     semaglutide      2023- Yes                      inject           [facility]

vers



     (OZEMPIC)       07-19                          0.25 mg           ity of



     0.25 mg or      00:00: 04:59                          under the           T

[facility]



     0.5 mg (2      00   :00                           skin           Medical



     mg/3 mL)                                         weekly for           [facility]

h



     PnIj                                         28 days,           



                                                  THEN 0.5           



                                                  mg weekly           



                                                  for 56           



                                                  days.           

 

     blood sugar      2023- No                       Use as           [facility]

[facility]



     diagnostic       05-04                          directed           ity 

of



     (ONETOUCH      00:00: 00:00                                         [place]



     VERIO TEST      00   :00                                          Medical



     STRIPS)                                                        [facility]



     strip                                                        

 

     insulin NPH      2023- No             45U       inject 45           

[facility]



     human      4-20 04-20                          Units           ity of



     isophane      20:12: 00:00                          under the           [facility]

as



     (NOVOLIN N      01   :00                           skin 2           Medical



     SC)                                          (two)           [facility]



                                                  times           



                                                  daily.           

 

     insulin NPH      0      Yes       [id number] 60U       inject 60        

   [facility]



     100 unit/mL      4-20                               Units           ity of



     injection      00:00:                               under the           [facility]

as



               00                                 skin every           Medical



                                                  morning           [facility]



                                                  and            



                                                  evening.           

 

     insulin NPH      -0      Yes       [id number] 60U       inject 60        

   [facility]



     100 unit/mL      4-20                               Units           ity of



     injection      00:00:                               under the           [facility]

as



               00                                 skin every           Medical



                                                  morning           [facility]



                                                  and            



                                                  evening.           

 

     insulin NPH      -0      Yes       [id number] 60U       inject 60        

   [facility]



     100 unit/mL      4-20                               Units           ity of



     injection      00:00:                               under the           [facility]

as



               00                                 skin every           Medical



                                                  morning           [facility]



                                                  and            



                                                  evening.           

 

     insulin NPH      0      Yes       [id number] 60U       inject 60        

   [facility]



     100 unit/mL      4-20                               Units           ity of



     injection      00:00:                               under the           [facility]

as



               00                                 skin every           Medical



                                                  morning           [facility]



                                                  and            



                                                  evening.           

 

     insulin NPH      -0      Yes       [id number] 60U       inject 60        

   [facility]



     100 unit/mL      4-20                               Units           ity of



     injection      00:00:                               under the           [facility]

as



               00                                 skin every           Medical



                                                  morning           [facility]



                                                  and            



                                                  evening.           

 

     insulin NPH      -0      Yes       [id number] 60U       inject 60        

   [facility]



     100 unit/mL      4-20                               Units           ity of



     injection      00:00:                               under the           [facility]

as



               00                                 skin every           Medical



                                                  morning           [facility]



                                                  and            



                                                  evening.           

 

     insulin NPH      -0      Yes       [id number] 60U       inject 60        

   [facility]



     100 unit/mL      4-20                               Units           ity of



     injection      00:00:                               under the           [facility]

as



               00                                 skin every           Medical



                                                  morning           [facility]



                                                  and            



                                                  evening.           

 

     insulin NPH      -0      Yes       [id number] 60U       inject 60        

   [facility]



     100 unit/mL      4-20                               Units           ity of



     injection      00:00:                               under the           [facility]

as



               00                                 skin every           Medical



                                                  morning           [facility]



                                                  and            



                                                  evening.           

 

     insulin NPH      -0      Yes       [id number] 60U       inject 60        

   [facility]



     100 unit/mL      4-20                               Units           ity of



     injection      00:00:                               under the           [facility]

as



               00                                 skin every           Medical



                                                  morning           [facility]



                                                  and            



                                                  evening.           

 

     insulin NPH      -0      Yes       [id number] 60U       inject 60        

   [facility]



     100 unit/mL      4-20                               Units           ity of



     injection      00:00:                               under the           [facility]

as



               00                                 skin every           Medical



                                                  morning           [facility]



                                                  and            



                                                  evening.           

 

     insulin NPH      -0      Yes       [id number] 60U       inject 60        

   [facility]



     100 unit/mL      4-20                               Units           ity of



     injection      00:00:                               under the           [facility]

as



               00                                 skin every           Medical



                                                  morning           [facility]



                                                  and            



                                                  evening.           

 

     insulin NPH      -0      Yes       [id number] 60U       inject 60        

   [facility]



     100 unit/mL      4-20                               Units           ity of



     injection      00:00:                               under the           [facility]

as



               00                                 skin every           Medical



                                                  morning           [facility]



                                                  and            



                                                  evening.           

 

     cephALEXin      2023- No             500mg      500 mg,           Un

[facility]



     (KEFLEX)                                Oral,           ity of



     capsule 500      23:45: 23:53                          ONCE, 1           [facility]

[facility]



     mg        00   :00                           dose, On           Medical



                                                  Wed            [facility]



                                                  23 at           



                                                  1845,           



                                                  ASAP<br>Re           



                                                  ason for           



                                                  Anti-Infec           



                                                  tive:           



                                                  Documented           



                                                  Infection<           



                                                  br>Documen           



                                                  huma            



                                                  Infection           



                                                  Site:           



                                                  Urine<br>D           



                                                  uration of           



                                                  Therapy:           



                                                  Other (see           



                                                  Comments)           

 

     insulin      2023- No             6U        6 Units,           Un[facility]

rs



     regular                                Slow IV           ity of



     human      23:15: 22:41                          [place],           [place]



     (HUMULIN R)      00   :00                           ONCE, 1           Medic

al



     injection 6                                         dose, On           [facility]

ch



     Units                                         Wed            



                                                  23 at           



                                                  1815,           



                                                  STAT<br>In           



                                                  dication           



                                                  for            



                                                  insulin:           



                                                  Hyperglyce           



                                                  jarvis            

 

     NaCl 0.9%      2023- No             1000mL      at 999           Uni

vers



     (NS) bolus                                mL/hr,           ity of



     infusion      22:30: 23:56                          1,000 mL,           [facility]

as



     1,000 mL      00   :00                           IV             Medical



                                                  Infusion,           [facility]



                                                  ONCE, 1           



                                                  dose, On           



                                                  23 at           



                                                  1730, STAT           

 

     acetaminoph      2023- No             1000mg      1,000 mg,         

  [facility]



     en                                  Oral,           ity of



     (TYLENOL)      21:45: 21:38                          ONCE, 1           [place]

[facility]



     tablet      00   :00                           dose, On           Medical



     1,000 mg                                         Wed            [facility]



                                                  23 at           



                                                  1645, ASAP           

 

     NaCl 0.9%      2023- No             1000mL      at 999           Uni

vers



     (NS) bolus                                mL/hr,           ity of



     infusion      21:45: 23:56                          1,000 mL,           [facility]

as



     1,000 mL      00   :00                           IV             Medical



                                                  Infusion,           [facility]



                                                  ONCE, 1           



                                                  dose, On           



                                                  23 at           



                                                  1645, STAT           

 

     cephALEXin      3-0      Yes       [id number] 500mg      Take 1           

[facility]



     (KEFLEX)      4-19                               capsule by           ity o

f



     500 mg      00:00:                               mouth 4           [place]



     capsule      00                                 (four)           Medical



                                                  times           [facility]



                                                  daily.           

 

     cephALEXin      2023-0      Yes       [id number] 500mg      Take 1           

[facility]



     (KEFLEX)      4-19                               capsule by           ity o

f



     500 mg      00:00:                               mouth 4           [place]



     capsule      00                                 (four)           Medical



                                                  times           [facility]



                                                  daily.           

 

     cephALEXin      2023-0      Yes       [id number] 500mg      Take 1           

[facility]



     (KEFLEX)      4-19                               capsule by           ity o

f



     500 mg      00:00:                               mouth 4           [place]



     capsule      00                                 (four)           Medical



                                                  times           [facility]



                                                  daily.           

 

     cephALEXin      2023-0      Yes       [id number] 500mg      Take 1           

[facility]



     (KEFLEX)      4-19                               capsule by           ity o

f



     500 mg      00:00:                               mouth 4           [place]



     capsule      00                                 (four)           Medical



                                                  times           [facility]



                                                  daily.           

 

     cephALEXin      2023-0      Yes       [id number] 500mg      Take 1           

[facility]



     (KEFLEX)      4-19                               capsule by           ity o

f



     500 mg      00:00:                               mouth 4           [place]



     capsule      00                                 (four)           Medical



                                                  times           [facility]



                                                  daily.           

 

     cephALEXin      2023-0      Yes       [id number] 500mg      Take 1           

[facility]



     (KEFLEX)      4-19                               capsule by           ity o

f



     500 mg      00:00:                               mouth 4           [place]



     capsule      00                                 (four)           Medical



                                                  times           [facility]



                                                  daily.           

 

     cephALEXin      2023-0      Yes       [id number] 500mg      Take 1           

[facility]



     (KEFLEX)      4-19                               capsule by           ity o

f



     500 mg      00:00:                               mouth 4           [place]



     capsule      00                                 (four)           Medical



                                                  times           [facility]



                                                  daily.           

 

     cephALEXin      2023-0      Yes       [id number] 500mg      Take 1           

[facility]



     (KEFLEX)      4-19                               capsule by           ity o

f



     500 mg      00:00:                               mouth 4           [place]



     capsule      00                                 (four)           Medical



                                                  times           [facility]



                                                  daily.           

 

     cephALEXin      2023-0      Yes       [id number] 500mg      Take 1           

[facility]



     (KEFLEX)      4-19                               capsule by           ity o

f



     500 mg      00:00:                               mouth 4           [place]



     capsule      00                                 (four)           Medical



                                                  times           [facility]



                                                  daily.           

 

     cephALEXin      2023-0      Yes       [id number] 500mg      Take 1           

[facility]



     (KEFLEX)      4-19                               capsule by           ity o

f



     500 mg      00:00:                               mouth 4           [place]



     capsule      00                                 (four)           Medical



                                                  times           [facility]



                                                  daily.           

 

     cephALEXin      2023-0      Yes       [id number] 500mg      Take 1           

[facility]



     (KEFLEX)      4-19                               capsule by           ity o

f



     500 mg      00:00:                               mouth 4           [place]



     capsule      00                                 (four)           Medical



                                                  times           [facility]



                                                  daily.           

 

     cephALEXin      2023-0      Yes       [id number] 500mg      Take 1           

[facility]



     (KEFLEX)      4-19                               capsule by           ity o

f



     500 mg      00:00:                               mouth 4           [place]



     capsule      00                                 (four)           Medical



                                                  times           [facility]



                                                  daily.           

 

     cephALEXin      2023-0      Yes       [id number] 500mg      Take 1           

[facility]



     (KEFLEX)      4-19                               capsule by           ity o

f



     500 mg      00:00:                               mouth 4           [place]



     capsule      00                                 (four)           Medical



                                                  times           [facility]



                                                  daily.           

 

     cefpodoxime      2023-0      Yes            200mg      Take 1           Uni

vers



     200 mg      4-16                               tablet by           ity of



     tablet      00:00:                               mouth in           [place]



                                                the            Medical



                                                  morning           [facility]



                                                  and 1           



                                                  tablet in           



                                                  the            



                                                  evening.           

 

     cefpodoxime      2023-0      Yes            200mg      Take 1           Uni

vers



     200 mg      4-16                               tablet by           ity of



     tablet      00:00:                               mouth in           [place]



                                                the            Medical



                                                  morning           [facility]



                                                  and 1           



                                                  tablet in           



                                                  the            



                                                  evening.           

 

     cefpodoxime      2023-0      Yes            200mg      Take 1           Un[facility]

vers



     200 mg      4-16                               tablet by           ity of



     tablet      00:00:                               mouth in           [place]



                                                the            Medical



                                                  morning           [facility]



                                                  and 1           



                                                  tablet in           



                                                  the            



                                                  evening.           

 

     cefpodoxime      2023-0      Yes            200mg      Take 1           [facility]

vers



     200 mg      4-16                               tablet by           ity of



     tablet      00:00:                               mouth in           [place]



                                                the            Medical



                                                  morning           [facility]



                                                  and 1           



                                                  tablet in           



                                                  the            



                                                  evening.           

 

     FENTanyl PF      2023-0 2023- No             50ug      50 mcg,           Un

[facility]



     (SUBLIMAZE      4-10 04-10                          Slow IV           ity o

f



     (PF))      02:15: 02:39                          [place],           [place]



     injection      00   :00                           ONCE, 1           Medical



     50 mcg                                         dose, On           [facility]



                                                  Sun 23           



                                                  at 2115,           



                                                  Routine           

 

     insulin      2023- No             14U       14 Units,           Univ

ers



     regular      4-10 04-10                          Subcutaneo           ity o

f



     human      01:30: 00:59                          us, ONCE,           [place]



     (HUMULIN R)      00   :00                           1 dose, On           Me

dical



     injection                                         Sun 23           [facility]

ch



     14 Units                                         at 2030,           



                                                  Routine<br           



                                                  >Indicatio           



                                                  n for           



                                                  insulin:           



                                                  Hyperglyce           



                                                  jarvis            

 

     cefTRIAXone      2023- No             1000mg      1,000 mg,         

  [facility]



     (ROCEPHIN)      4-10 04-10                          IV             ity of



     1,000 mg in      01:00: 01:31                          Piggyback,          

 [place]



     NaCl 0.9%      00   :00                           ONCE, 1           Medical



     (NS) 100 mL                                         dose, On           [facility]

ch



     MINI-BAG                                         Sun 23           



                                                  at 2000,           



                                                  Administer           



                                                  over 30           



                                                  Minutes,           



                                                  100            



                                                  mL<br>Reas           



                                                  on for           



                                                  Anti-Infec           



                                                  tive:           



                                                  Documented           



                                                  Infection<           



                                                  br>Documen           



                                                  huma            



                                                  Infection           



                                                  Site:           



                                                  Urine<br&g           



                                                  t;Duration           



                                                  of             



                                                  Therapy: 7           



                                                  days           

 

     NaCl 0.9%      -0 - No             500mL      at 999           Univ

ers



     (NS) bolus      4-10 04-10                          mL/hr, 500           it

y of



     infusion      01:00: 02:45                          mL, IV           [place]



     500 mL      00   :00                           Infusion,           Medical



                                                  ONCE, 1           [facility]



                                                  dose, On           



                                                  Sun 23           



                                                  at 2000,           



                                                  STAT           

 

     NaCl 0.9%      -0 - No             1000mL      at 999           Uni

vers



     (NS) bolus       04-10                          mL/hr,           ity of



     infusion      23:15: 03:46                          1,000 mL,           [facility]

as



     1,000 mL      00   :00                           IV             Medical



                                                  Infusion,           [facility]



                                                  ONCE, 1           



                                                  dose, On           



                                                  Sun 23           



                                                  at 1815,           



                                                  ASAP           

 

     FENTanyl PF      -2023- No             50ug      50 mcg,           Un

[facility]



     (SUBLIMAZE      -                          Slow IV           ity o

f



     (PF))      23:15: 23:11                          Push,           [place]



     injection      00   :00                           ONCE, 1           Medical



     50 mcg                                         dose, On           [facility]



                                                  Sun 23           



                                                  at 1815,           



                                                  Routine           

 

     insulin NPH      3-0      Yes            45U       inject 45           U

nivers



     human      4-09                               Units           ity of



     isophane      21:52:                               under the           [place]

[facility]



     (NOVOLIN N      29                                 skin 2           Medical



     SC)                                          (two)           [facility]



                                                  times           



                                                  daily.           

 

     insulin NPH      2023-0      Yes            45U       inject 45           U

nivers



     human      4-09                               Units           ity of



     isophane      21:52:                               under the           [place]

[facility]



     (NOVOLIN N      29                                 skin 2           Medical



     SC)                                          (two)           [facility]



                                                  times           



                                                  daily.           

 

     insulin NPH      2023-0      Yes            45U       inject 45           U

nivers



     human      4-09                               Units           ity of



     isophane      21:52:                               under the           [place]

[facility]



     (NOVOLIN N      29                                 skin 2           Medical



     SC)                                          (two)           [facility]



                                                  times           



                                                  daily.           

 

     insulin NPH      2023-0      Yes            45U       inject 45           U

nivers



     human      4-09                               Units           ity of



     isophane      21:52:                               under the           [place]

[facility]



     (NOVOLIN N      29                                 skin 2           Medical



     SC)                                          (two)           [facility]



                                                  times           



                                                  daily.           

 

     insulin NPH      2023-0      Yes            45U       inject 45           U

nivers



     human      4-09                               Units           ity of



     isophane      21:52:                               under the           [place]

[facility]



     (NOVOLIN N      29                                 skin 2           Medical



     SC)                                          (two)           [facility]



                                                  times           



                                                  daily.           

 

     insulin NPH      2023-0      Yes            45U       inject 45           U

nivers



     human      4-09                               Units           ity of



     isophane      21:52:                               under the           [place]

[facility]



     (NOVOLIN N      29                                 skin 2           Medical



     SC)                                          (two)           [facility]



                                                  times           



                                                  daily.           

 

     insulin NPH      2023-0      Yes            45U       inject 45           U

nivers



     human      4-09                               Units           ity of



     isophane      21:52:                               under the           [place]

[facility]



     (NOVOLIN N      29                                 skin 2           Medical



     SC)                                          (two)           [facility]



                                                  times           



                                                  daily.           

 

     insulin NPH      2023-0      Yes            45U       inject 45           U

nivers



     human      4-09                               Units           ity of



     isophane      21:52:                               under the           [place]

[facility]



     (NOVOLIN N      29                                 skin 2           Medical



     SC)                                          (two)           [facility]



                                                  times           



                                                  daily.           

 

     insulin NPH      2023-0      Yes            45U       inject 45           U

nivers



     human      4-09                               Units           ity of



     isophane      21:52:                               under the           [place]

[facility]



     (NOVOLIN N      29                                 skin 2           Medical



     SC)                                          (two)           [facility]



                                                  times           



                                                  daily.           

 

     insulin NPH      2023-0      Yes       [id number] 45U       inject 45        

   [facility]



     100 unit/mL      4-09                               Units           ity of



     injection      00:00:                               under the           [facility]

as



               00                                 skin every           Medical



                                                  morning           [facility]



                                                  and            



                                                  evening.           

 

     insulin NPH      0      Yes       [id number] 45U       inject 45        

   [facility]



     100 unit/mL      4-09                               Units           ity of



     injection      00:00:                               under the           [facility]

as



               00                                 skin every           Medical



                                                  morning           [facility]



                                                  and            



                                                  evening.           

 

     insulin NPH      -0      Yes       [id number] 45U       inject 45        

   [facility]



     100 unit/mL      4-09                               Units           ity of



     injection      00:00:                               under the           [facility]

as



               00                                 skin every           Medical



                                                  morning           [facility]



                                                  and            



                                                  evening.           

 

     insulin NPH      -0      Yes       [id number] 45U       inject 45        

   [facility]



     100 unit/mL      4-09                               Units           ity of



     injection      00:00:                               under the           [facility]

as



               00                                 skin every           Medical



                                                  morning           [facility]



                                                  and            



                                                  evening.           

 

     insulin NPH      -0      Yes       [id number] 45U       inject 45        

   [facility]



     100 unit/mL      4-09                               Units           ity of



     injection      00:00:                               under the           [facility]

as



               00                                 skin every           Medical



                                                  morning           [facility]



                                                  and            



                                                  evening.           

 

     insulin NPH      0      Yes       [id number] 45U       inject 45        

   [facility]



     100 unit/mL      4-09                               Units           ity of



     injection      00:00:                               under the           [facility]

as



               00                                 skin every           Medical



                                                  morning           [facility]



                                                  and            



                                                  evening.           

 

     insulin NPH      0      Yes       [id number] 45U       inject 45        

   [facility]



     100 unit/mL      4-09                               Units           ity of



     injection      00:00:                               under the           [facility]

as



               00                                 skin every           Medical



                                                  morning           [facility]



                                                  and            



                                                  evening.           

 

     insulin NPH      0      Yes       [id number] 45U       inject 45        

   [facility]



     100 unit/mL      4-09                               Units           ity of



     injection      00:00:                               under the           [facility]

as



               00                                 skin every           Medical



                                                  morning           [facility]



                                                  and            



                                                  evening.           

 

     insulin NPH      0      Yes       [id number] 45U       inject 45        

   [facility]



     100 unit/mL      4-09                               Units           ity of



     injection      00:00:                               under the           [facility]

as



               00                                 skin every           Medical



                                                  morning           [facility]



                                                  and            



                                                  evening.           

 

     insulin NPH      2023- No        [id number] 45U       inject 45       

    [facility]



     100 unit/mL      4 04-20                          Units           ity of



     injection      00:00: 00:00                          under the           [facility]

[place]



               00   :00                           skin every           [facility]



                                                  morning           [facility]



                                                  and            



                                                  evening.           

 

     cefpodoxime      2023- Yes       [id number] 200mg      Take 1        

   [facility]



     200 mg      -17                          tablet by           ity of



     tablet      00:00: 04:59                          mouth in           [place]



               00   :00                           the            Medical



                                                  morning           [facility]



                                                  and 1           



                                                  tablet in           



                                                  the            



                                                  evening.           



                                                  Do all           



                                                  this for 7           



                                                  days.           

 

     cefpodoxime      2023- Yes       [id number] 200mg      Take 1        

   [facility]



     200 mg                                tablet by           ity of



     tablet      00:00: 04:59                          mouth in           [place]



               00   :00                           the            Medical



                                                  morning           [facility]



                                                  and 1           



                                                  tablet in           



                                                  the            



                                                  evening.           



                                                  Do all           



                                                  this for 7           



                                                  days.           

 

     cefTRIAXone      2023- No             1000mg      1,000 mg,         

  [facility]



     (ROCEPHIN)      3-25 03-25                          IV             ity of



     1,000 mg in      00:00: 00:31                          [place], [place]



     NaCl 0.9%      00   :00                           ONCE, 1           Medical



     (NS) 100 mL                                         dose, On           [facility]

ch



     MINI-BAG                                         Fri            



                                                  3/24/23 at           



                                                  1900,           



                                                  Administer           



                                                  over 30           



                                                  Minutes,           



                                                  100            



                                                  mL<br>Reas           



                                                  on for           



                                                  Anti-Infec           



                                                  tive:           



                                                  Documented           



                                                  Infection<           



                                                  br>Documen           



                                                  huma            



                                                  Infection           



                                                  Site:           



                                                  Urine<br&g           



                                                  t;Duration           



                                                  of             



                                                  Therapy: 7           



                                                  days           

 

     NaCl 0.9%      2023- No             1000mL      at 999           Uni

vers



     (NS) bolus      3-24 03-25                          mL/hr,           ity of



     infusion      23:45: 01:32                          1,000 mL,           [facility]

as



     1,000 mL      00   :00                           IV             Medical



                                                  Piggyback,           [place]



                                                  ONCE, 1           



                                                  dose, On           



                                                  Fri            



                                                  3/24/23 at           



                                                  1845, STAT           

 

     insulin      2023- No             10U       10 Units,           Univ

ers



     regular      3-24 03-24                          Subcutaneo           ity o

f



     human      23:45: 23:08                          , ONCE,           [place]



     (HUMULIN R)      00   :00                           1 dose, On           Me

dical



     injection                                         Fri            [facility]



     10 Units                                         3/24/23 at           



                                                  1845,           



                                                  Routine<br           



                                                  >Indicatio           



                                                  n for           



                                                  insulin:           



                                                  Hyperglyce           



                                                  jarvis            

 

     iopamidol      2023- No        [id number] 100mL      100 mL,          

 [facility]



     (ISOVUE      3-24 03-24                          Intravenou           ity o

f



     370-500 mL)      23:15: 22:22                          s, ONCE, 1          

 [place]



     injection      00   :00                           dose, On           Medica

l



     100 mL                                         Fri            [facility]



                                                  3/24/23 at           



                                                  1815,           



                                                  Routine           

 

     FENTanyl PF      2023- No             50ug      50 mcg,           Un

[facility]



     (SUBLIMAZE      3-24 03-24                          Slow IV           ity o

f



     (PF))      22:15: 21:42                          Push,           [place]



     injection      00   :00                           ONCE, 1           Medical



     50 mcg                                         dose, On           [facility]



                                                  Fri            



                                                  3/24/23 at           



                                                  1715,           



                                                  Routine           

 

     NaCl 0.9%      2023- No             1000mL      at 999           Uni

vers



     (NS) bolus      3-24 03-25                          mL/hr,           ity of



     infusion      22:00: 01:32                          1,000 mL,           [facility]

as



     1,000 mL      00   :00                           IV             Medical



                                                  Infusion,           [facility]



                                                  ONCE, 1           



                                                  dose, On           



                                                  Fri            



                                                  3/24/23 at           



                                                  1700, ASAP           

 

     proMETHazin      -0 - No             25mg      25 mg, IV           

[facility]



     e         3-24 03-24                          Piggyback,           ity of



     (PHENERGAN)      21:15: 21:46                          ONCE, 1           [facility]

[place]



     25 mg in      00   :00                           dose, On           Medical



     NaCl 0.9%                                         Fri            [facility]



     (NS) 50 mL                                         3/24/23 at           



     IV                                           1615, ASAP           



     piggyback                                                        

 

     cefdinir      -0 - Yes       [id number] 300mg      Take 1           

[facility]



     300 mg      3-24 04-08                          capsule by           ity of



     capsule      00:00: 04:59                          mouth           [place]



               00   :00                           every 12           [facility]



                                                  (twelve)           [facility]



                                                  hours for           



                                                  14 days.           

 

     cefdinir      -0 - Yes       [id number] 300mg      Take 1           

[facility]



     300 mg      3-24 04-08                          capsule by           [facility] of



     capsule      00:00: 04:59                          mouth           [place]



               00   :00                           every 12           [facility]



                                                  (twelve)           [facility]



                                                  hours for           



                                                  14 days.           

 

     cefdinir      -0 - Yes       [id number] 300mg      Take 1           

[facility]



     300 mg      3 04-08                          capsule by           [facility] of



     capsule      00:00: 04:59                          mouth           [place]



               00   :00                           every 12           [facility]



                                                  (twelve)           [facility]



                                                  hours for           



                                                  14 days.           

 

     sodium      3-0      Yes                      Topical,           [facility]



     hypochlorit      2-23                               TID, First           it

y of



     e 0.25%      20:00:                               dose on           [place]



     (DAKIN'S      00                                 Thu            Medical



     SOLUTION)                                         23 at           [facility]

ch



     solution                                         1400,           



                                                  Until           



                                                  Discontinu           



                                                  ed,            



                                                  Routine           

 

     sodium      2023-0      Yes                      Topical,           [facility]



     hypochlorit      2-23                               TID, First           it

y of



     e 0.25%      20:00:                               dose on           [place]



     (DAKIN'S      00                                 Thu            Medical



     SOLUTION)                                         23 at           [facility]

ch



     solution                                         1400,           



                                                  Until           



                                                  Discontinu           



                                                  ed,            



                                                  Routine           

 

     HYDROmorpho      -0      Yes            4mg       Take 4 mg           U

nivers



     ne 4 mg      2-23                               by mouth           [facility]



     tablet      14:27:                               in the           [place]



               27                                 morning           [facility]



                                                  and 4 mg           [facility]



                                                  at noon           



                                                  and 4 mg           



                                                  in the           



                                                  evening.           

 

     insulin NPH      2023-0      Yes            45U       inject 45           U

nivers



     human      2-23                               Units           [facility]



     isophane      14:27:                               under the           [place]

s



     (NOVOLIN N      27                                 skin 2           Medical



     SC)                                          (two)           [facility]



                                                  times           



                                                  daily.           

 

     HYDROmorpho      2023-0      Yes            4mg       Take 4 mg           U

nivers



     ne 4 mg      2-23                               by mouth           [facility]



     tablet      14:27:                               in the           [place]



               27                                 morning           [facility]



                                                  and 4 mg           [facility]



                                                  at noon           



                                                  and 4 mg           



                                                  in the           



                                                  evening.           

 

     insulin NPH      2023-0      Yes            45U       inject 45           U

nivers



     human      2-23                               Units           ity [facility]



     isophane      14:27:                               under the           [place]

s



     (NOVOLIN N      27                                 skin 2           Medical



     SC)                                          (two)           [facility]



                                                  times           



                                                  daily.           

 

     HYDROmorpho      2023-0      Yes            4mg       Take 4 mg           [facility]

nivers



     ne 4 mg      2-23                               by mouth           [facility]



     tablet      14:27:                               in the           [place]



               27                                 morning           [facility]



                                                  and 4 mg           [facility]



                                                  at noon           



                                                  and 4 mg           



                                                  in the           



                                                  evening.           

 

     insulin NPH      2023-0      Yes            45U       inject 45           U

nivers



     human      2-23                               Units           ity of



     isophane      14:27:                               under the           [place]

s



     (NOVOLIN N      27                                 skin 2           Medical



     SC)                                          (two)           [facility]



                                                  times           



                                                  daily.           

 

     HYDROmorpho      2023-0      Yes            4mg       Take 4 mg           U

nivers



     ne 4 mg      2-23                               by mouth           [facility]



     tablet      14:27:                               in the           [place]



               27                                 morning           [facility]



                                                  and 4 mg           [facility]



                                                  at noon           



                                                  and 4 mg           



                                                  in the           



                                                  evening.           

 

     insulin NPH      2023-0      Yes            45U       inject 45           U

nivers



     human      2-23                               Units           ity of



     isophane      14:27:                               under the           [place]

s



     (NOVOLIN N      27                                 skin 2           Medical



     SC)                                          (two)           [facility]



                                                  times           



                                                  daily.           

 

     HYDROmorpho      2023-0      Yes            4mg       Take 4 mg           [facility]

n[facility]



     ne 4 mg      2-23                               by mouth           [facility]



     tablet      14:27:                               in the           [place]



               27                                 morning           [facility]



                                                  and 4 mg           [facility]



                                                  at noon           



                                                  and 4 mg           



                                                  in the           



                                                  evening.           

 

     insulin NPH      2023-0      Yes            45U       inject 45           U

nivers



     human      2-23                               Units           ity of



     isophane      14:27:                               under the           [place]

s



     (NOVOLIN N      27                                 skin 2           Medical



     SC)                                          (two)           [facility]



                                                  times           



                                                  daily.           

 

     HYDROmorpho      2023-0      Yes            4mg       Take 4 mg           [facility]

nivers



     ne 4 mg      2-23                               by mouth           [facility]



     tablet      14:27:                               in the           [place]



               27                                 morning           [facility]



                                                  and 4 mg           [facility]



                                                  at noon           



                                                  and 4 mg           



                                                  in the           



                                                  evening.           

 

     HYDROmorpho      2023-0      Yes            4mg       Take 4 mg           [facility]

nivers



     ne 4 mg      2-23                               by mouth           [facility] of



     tablet      14:27:                               in the           [place]



               27                                 morning           [facility]



                                                  and 4 mg           [facility]



                                                  at noon           



                                                  and 4 mg           



                                                  in the           



                                                  evening.           

 

     HYDROmorpho      2023-0      Yes            4mg       Take 4 mg           [facility]

n[facility]



     ne 4 mg      2-23                               by mouth           [facility]



     tablet      14:27:                               in the           [place]



               27                                 morning           [facility]



                                                  and 4 mg           [facility]



                                                  at noon           



                                                  and 4 mg           



                                                  in the           



                                                  evening.           

 

     HYDROmorpho      2023-0      Yes            4mg       Take 4 mg           [facility]

n[facility]



     ne 4 mg      2-23                               by mouth           [facility]



     tablet      14:27:                               in the           [place]



               27                                 morning           [facility]



                                                  and 4 mg           [facility]



                                                  at noon           



                                                  and 4 mg           



                                                  in the           



                                                  evening.           

 

     HYDROmorpho      2023-0      Yes            4mg       Take 4 mg           [facility]

n[facility]



     ne 4 mg      2-23                               by mouth           [facility]



     tablet      14:27:                               in the           [place]



               27                                 morning           [facility]



                                                  and 4 mg           [facility]



                                                  at noon           



                                                  and 4 mg           



                                                  in the           



                                                  evening.           

 

     HYDROmorpho      2023-0      Yes            4mg       Take 4 mg           [facility]

nivers



     ne 4 mg      2-23                               by mouth           [facility]



     tablet      14:27:                               in the           [place]



               27                                 morning           [facility]



                                                  and 4 mg           [facility]



                                                  at noon           



                                                  and 4 mg           



                                                  in the           



                                                  evening.           

 

     HYDROmorpho      2023-0      Yes            4mg       Take 4 mg           [facility]

nivers



     ne 4 mg      2-23                               by mouth           ity of



     tablet      14:27:                               in the           [place]



               27                                 morning           Medical



                                                  and 4 mg           [facility]



                                                  at noon           



                                                  and 4 mg           



                                                  in the           



                                                  evening.           

 

     HYDROmorpho      2023-0      Yes            4mg       Take 4 mg           [facility]

n[facility]



     ne 4 mg      2-23                               by mouth           [facility] of



     tablet      14:27:                               in the           [place]



               27                                 morning           [facility]



                                                  and 4 mg           [facility]



                                                  at noon           



                                                  and 4 mg           



                                                  in the           



                                                  evening.           

 

     HYDROmorpho      2023-0      Yes            4mg       Take 4 mg           [facility]

nivers



     ne 4 mg      2-23                               by mouth           [facility] of



     tablet      14:27:                               in the           [place]



               27                                 morning           [facility]



                                                  and 4 mg           [facility]



                                                  at noon           



                                                  and 4 mg           



                                                  in the           



                                                  evening.           

 

     HYDROmorpho      2023-0      Yes            4mg       Take 4 mg           [facility]

nivers



     ne 4 mg      2-23                               by mouth           [facility] of



     tablet      14:27:                               in the           [place]



               27                                 morning           [facility]



                                                  and 4 mg           [facility]



                                                  at noon           



                                                  and 4 mg           



                                                  in the           



                                                  evening.           

 

     HYDROmorpho      2023-0      Yes            4mg       Take 4 mg           [facility]

nivers



     ne 4 mg      2-23                               by mouth           ity of



     tablet      14:27:                               in the           [place]



               27                                 morning           [facility]



                                                  and 4 mg           [facility]



                                                  at noon           



                                                  and 4 mg           



                                                  in the           



                                                  evening.           

 

     HYDROmorpho      2023-0      Yes            4mg       Take 4 mg           [facility]

nivers



     ne 4 mg      2-23                               by mouth           ity of



     tablet      14:27:                               in the           [place]



               27                                 morning           [facility]



                                                  and 4 mg           [facility]



                                                  at noon           



                                                  and 4 mg           



                                                  in the           



                                                  evening.           

 

     HYDROmorpho      2023-0      Yes            4mg       Take 4 mg           [facility]

nivers



     ne 4 mg      2-23                               by mouth           ity of



     tablet      14:27:                               in the           [place]



               27                                 morning           [facility]



                                                  and 4 mg           [facility]



                                                  at noon           



                                                  and 4 mg           



                                                  in the           



                                                  evening.           

 

     HYDROmorpho      2023-0      Yes            4mg       Take 4 mg           [facility]

nivers



     ne 4 mg      2-23                               by mouth           [facility] of



     tablet      14:27:                               in the           [place]



               27                                 morning           [facility]



                                                  and 4 mg           [facility]



                                                  at noon           



                                                  and 4 mg           



                                                  in the           



                                                  evening.           

 

     HYDROmorpho      2023-0      Yes            4mg       Take 4 mg           [facility]

nivers



     ne 4 mg      2-23                               by mouth           ity of



     tablet      14:27:                               in the           [place]



               27                                 morning           [facility]



                                                  and 4 mg           [facility]



                                                  at noon           



                                                  and 4 mg           



                                                  in the           



                                                  evening.           

 

     HYDROmorpho      2023-0      Yes            4mg       Take 4 mg           [facility]

nivers



     ne 4 mg      2-23                               by mouth           ity of



     tablet      14:27:                               in the           [place]



               27                                 morning           Medical



                                                  and 4 mg           [facility]



                                                  at noon           



                                                  and 4 mg           



                                                  in the           



                                                  evening.           

 

     HYDROmorpho      2023-0      Yes            4mg       Take 4 mg           U

nivers



     ne 4 mg      2-23                               by mouth           ity of



     tablet      14:27:                               in the           [place]



               27                                 morning           Medical



                                                  and 4 mg           [facility]



                                                  at noon           



                                                  and 4 mg           



                                                  in the           



                                                  evening.           

 

     HYDROmorpho      2023-0      Yes            4mg       Take 4 mg           U

nivers



     ne 4 mg      2-23                               by mouth           ity of



     tablet      14:27:                               in the           [place]



               27                                 morning           Medical



                                                  and 4 mg           [facility]



                                                  at noon           



                                                  and 4 mg           



                                                  in the           



                                                  evening.           

 

     HYDROmorpho      2023-0      Yes            4mg       Take 4 mg           U

nivers



     ne 4 mg      2-23                               by mouth           ity of



     tablet      14:27:                               in the           [place]



               27                                 morning           Medical



                                                  and 4 mg           [facility]



                                                  at noon           



                                                  and 4 mg           



                                                  in the           



                                                  evening.           

 

     HYDROmorpho      2023-0      Yes            4mg       Take 4 mg           [facility]

nivers



     ne 4 mg      2-23                               by mouth           ity of



     tablet      14:27:                               in the           [place]



               27                                 morning           Medical



                                                  and 4 mg           [facility]



                                                  at noon           



                                                  and 4 mg           



                                                  in the           



                                                  evening.           

 

     HYDROmorpho      2023-0      Yes            4mg       Take 4 mg           U

nivers



     ne 4 mg      2-23                               by mouth           ity of



     tablet      14:27:                               in the           [place]



               27                                 morning           Medical



                                                  and 4 mg           [facility]



                                                  at noon           



                                                  and 4 mg           



                                                  in the           



                                                  evening.           

 

     HYDROmorpho      2023-0 2023- No             1mg       1 mg, Slow          

 [facility]



     ne                                  IV Push,           ity of



     (DILAUDID)      11:09: 11:08                          Q4HPRN,           Eric

as



     injection 1      37   :37                           Starting           Medi

sarah



     mg                                           on Thu           [facility]



                                                  23 at           



                                                  0509,           



                                                  Until Sat           



                                                  23 at           



                                                  0508,           



                                                  Routine,           



                                                  Pain           



                                                  (scale           



                                                  7-10)<br>U           



                                                  se             



                                                  approved           



                                                  by             



                                                  (Faculty):           



                                                  [facility]            



                                                  PROVIDER           

 

     HYDROmorpho      2023-0 2023- No             1mg       1 mg, Slow          

 [facility]



     ne                                  IV Push,           ity of



     (DILAUDID)      11:09: 11:08                          Q4HPRN,           Eric

as



     injection 1      37   :37                           Starting           Medi

sarah



     mg                                           on Thu           Branch



                                                  23 at           



                                                  0509,           



                                                  Until Sat           



                                                  23 at           



                                                  0508,           



                                                  Routine,           



                                                  Pain           



                                                  (scale           



                                                  7-10)<br>U           



                                                  se             



                                                  approved           



                                                  by             



                                                  (Faculty):           



                                                  [facility]            



                                                  PROVIDER           

 

     HYDROmorpho      2023-0      Yes            4mg       Take 4 mg           U

nivers



     ne 4 mg      2-23                               by mouth           ity of



     tablet      10:57:                               in the           [place]



               10                                 morning           Medical



                                                  and 4 mg           Branch



                                                  at noon           



                                                  and 4 mg           



                                                  in the           



                                                  evening.           

 

     insulin NPH      2023-0      Yes            45U       inject 45           U

nivers



     human      2-23                               Units           ity of



     isophane      10:57:                               under the           [place]

s



     (NOVOLIN N      10                                 skin 2           Medical



     SC)                                          (two)           Branch



                                                  times           



                                                  daily.           

 

     HYDROmorpho      2023-0      Yes            4mg       Take 4 mg           U

nivers



     ne 4 mg      2-23                               by mouth           ity of



     tablet      10:57:                               in the           [place]



               10                                 morning           Medical



                                                  and 4 mg           Branch



                                                  at noon           



                                                  and 4 mg           



                                                  in the           



                                                  evening.           

 

     insulin NPH      2023-0      Yes            45U       inject 45           U

nivers



     human      2-                               Units           ity of



     isophane      10:57:                               under the           [place]

s



     (NOVOLIN N      10                                 skin 2           Medical



     SC)                                          (two)           Branch



                                                  times           



                                                  daily.           

 

     sodium      2023- No             16[oz_a      16 oz,           Unive

rs



     hypochlorit                      v]        Topical,           ity

 of



     e 0.5%      04:00: 18:17                          Q8H, First           [place]

s



     (DAKINS)      00   :09                           dose on           Medical



     solution 16                                         Wed            Branch



     oz                                           23 at           



                                                  2200,           



                                                  Until           



                                                  Discontinu           



                                                  ed,            



                                                  Routine           

 

     amitriptyli            Yes            25mg      25 mg,           Univ

ers



     ne (ELAVIL)      2-23                               Oral, QHS,           it

y of



     tablet 25      03:45:                               First dose           [place]

[place]



     mg        00                                 on Wed           Medical



                                                  23 at           Branch



                                                  2145,           



                                                  Until           



                                                  Discontinu           



                                                  ed,            



                                                  Routine           

 

     amitriptyli      0      Yes            25mg      25 mg,           Univ

ers



     ne (ELAVIL)      -                               Oral, QHS,           it

y of



     tablet 25      03:45:                               First dose           [place]

[place]



     mg        00                                 on Wed           Medical



                                                  23 at           Branch



                                                  2145,           



                                                  Until           



                                                  Discontinu           



                                                  ed,            



                                                  Routine           

 

     HYDROmorpho      2023- No             1mg       1 mg, Slow          

 [facility]



     ne                                  IV Push,           ity of



     (DILAUDID)      03:40: 09:56                          Q4HPRN,           Eric

as



     injection 1      47   :20                           Starting           Medi

sarah



     mg                                           on Wed           Branch



                                                  23 at           



                                                  2140,           



                                                  Until Thu           



                                                  23 at           



                                                  0356,           



                                                  Routine,           



                                                  Pain           



                                                  (scale           



                                                  7-10)<br>U           



                                                  se             



                                                  approved           



                                                  by             



                                                  (Faculty):           



                                                  [facility]            



                                                  PROVIDER           

 

     FENTanyl PF      2023- No             25ug      25 mcg,           Un

yoselyn



     (SUBLIMAZE                                Slow IV           ity o

f



     (PF))      20:55: 21:26                          Push,           [place]



     injection      32   :00                           Q5MIN PRN,           Medi

sarah



     25 mcg                                         4 doses,           Branch



                                                  Starting           



                                                  on Wed           



                                                  23 at           



                                                  1455,           



                                                  Until Wed           



                                                  23 at           



                                                  1526,           



                                                  Routine,           



                                                  Pain           



                                                  (scale           



                                                  7-10),           



                                                  PACU           

 

     bupivacaine      2023- No                       PRN,           Unive

rs



     (preserv                                Starting           ity of



     free)      20:36: 21:03                          on Wed           [place]



     (SENSORCAIN      00   :01                           23 at           Me

dical



     E MPF) 0.25                                         1436,           Branch



     % (2.5                                         Until Wed           



     mg/mL)                                         23 at           



     injection                                         1503,           



                                                  Routine,           



                                                  Intra-op           

 

     insulin      -0      Yes            4U        4 Units,           Univer

s



     lispro                                     Subcutaneo           ity of



     (human)      17:30:                               us, TIDAC           [place]

[place]



     (HumaLOG      00                                 First dose           Medic

al



     U-100)                                         on Wed           Branch



     injection 4                                         23 at           



     Units                                         1130,           



                                                  Until           



                                                  Discontinu           



                                                  ed,            



                                                  Routine           

 

     insulin      -0      Yes            4U        4 Units,           Univer

s



     lispro                                     Subcutaneo           ity of



     (human)      17:30:                               us, TIDAC,           [place]

[place]



     (HumaLOG      00                                 First dose           Medic

al



     U-100)                                         on Wed           Branch



     injection 4                                         23 at           



     Units                                         1130,           



                                                  Until           



                                                  Discontinu           



                                                  ed,            



                                                  Routine           

 

     proMETHazin      0      Yes            12.5mg      12.5 mg,           

Univers



     e                                        IV             ity of



     (PHENERGAN)      15:11:                               Piggyback,           

[place]



     12.5 mg in      52                                 at 200           Medical



     NaCl 0.9%                                         mL/hr           Branch



     (NS) 50 mL                                         Administer           



     IV                                           over 15           



     piggyback                                         Minutes,           



                                                  Q4HPRN,           



                                                  Starting           



                                                  on 23 at           



                                                  0911,           



                                                  Until           



                                                  Discontinu           



                                                  ed,            



                                                  Routine,           



                                                  N/V            



                                                  unresponsi           



                                                  ve to           



                                                  Ondansetro           



                                                  n              

 

     proMETHazin      -0      Yes            12.5mg      12.5 mg,           

Univers



     e                                        IV             ity of



     (PHENERGAN)      15:11:                               Piggyback,           

[place]



     12.5 mg in      52                                 at 200           Medical



     NaCl 0.9%                                         mL/hr           Branch



     (NS) 50 mL                                         Administer           



     IV                                           over 15           



     piggyback                                         Minutes,           



                                                  Q4HPRN,           



                                                  Starting           



                                                  on 23 at           



                                                  0911,           



                                                  Until           



                                                  Discontinu           



                                                  ed,            



                                                  Routine,           



                                                  N/V            



                                                  unresponsi           



                                                  ve to           



                                                  Ondansetro           



                                                  n              

 

     cefTRIAXone      -0 2023- No             1000mg      1,000 mg,         

  [facility]



     (ROCEPHIN)       0301                          IV             ity of



     1,000 mg in      04:00: 03:59                          Piggyback,          

 [place]



     NaCl 0.9%      00   :00                           Q24H, 7           Medical



     (NS) 50 mL                                         doses,           Branch



     MINI-BAG                                         First dose           



                                                  on 23 at           



                                                  2200, Last           



                                                  dose on           



                                                  23 at           



                                                  2200,           



                                                  Administer           



                                                  over 30           



                                                  Minutes,           



                                                  50             



                                                  mL<br>Reas           



                                                  on for           



                                                  Anti-Infec           



                                                  tive:           



                                                  Documented           



                                                  Infection<           



                                                  br>Documen           



                                                  huma            



                                                  Infection           



                                                  Site:           



                                                  Urine<br>D           



                                                  uration of           



                                                  Therapy: 7           



                                                  days           

 

     cefTRIAXone      -0 - No             1000mg      1,000 mg,         

  [facility]



     (ROCEPHIN)                                IV             ity of



     1,000 mg in      04:00: 03:59                          PigCunningham, [place]



     NaCl 0.9%      00   :00                           Q24H, 7           Medical



     (NS) 50 mL                                         doses,           Branch



     MINI-BAG                                         First dose           



                                                  on 23 at           



                                                  2200, Last           



                                                  dose on           



                                                  23 at           



                                                  2200,           



                                                  Administer           



                                                  over 30           



                                                  Minutes,           



                                                  50             



                                                  mL<br>Reas           



                                                  on for           



                                                  Anti-Infec           



                                                  tive:           



                                                  Documented           



                                                  Infection<           



                                                  br>Documen           



                                                  huma            



                                                  Infection           



                                                  Site:           



                                                  Urine<br>D           



                                                  uration of           



                                                  Therapy: 7           



                                                  days           

 

     insulin NPH      0      Yes            30U       30 Units,           U

nivers



     (HUMULIN N)      2-                               Subcutaneo           it

y of



     injection      03:00:                               ,            [place]



     30 Units      00                                 QAM+HS,           Medical



                                                  First dose           Branch



                                                  (after           



                                                  last           



                                                  modificati           



                                                  on) on 23 at           



                                                  2100,           



                                                  Until           



                                                  Discontinu           



                                                  ed,            



                                                  Routine           

 

     insulin NPH      0      Yes            30U       30 Units,           U

nivers



     (HUMULIN N)      2-                               Subcutaneo           it

y of



     injection      03:00:                               ,            [place]



     30 Units      00                                 QAM+HS,           Medical



                                                  First dose           Branch



                                                  (after           



                                                  last           



                                                  modificati           



                                                  on) on 23 at           



                                                  2100,           



                                                  Until           



                                                  Discontinu           



                                                  ed,            



                                                  Routine           

 

     enoxaparin      0      Yes            40mg      40 mg,           Unive

rs



     (LOVENOX)      2-                               Subcutaneo           ity 

of



     injection      23:00:                               us, DAILY,           [place]

[place]



     40 mg      00                                 First dose           Medical



                                                  on 23 at           



                                                  1700,           



                                                  Until           



                                                  Discontinu           



                                                  ed,            



                                                  Routine           

 

     enoxaparin      0      Yes            40mg      40 mg,           Unive

rs



     (LOVENOX)      2-                               Subcutaneo           ity 

of



     injection      23:00:                               us, DAILY,           [place]

[place]



     40 mg      00                                 First dose           Medical



                                                  on 23 at           



                                                  1700,           



                                                  Until           



                                                  Discontinu           



                                                  ed,            



                                                  Routine           

 

     proCHLORper            Yes            10mg      10 mg, IV           U

nivers



     azine                                     Piggyback,           ity of



     (COMPAZINE)      20:07:                               at 100           [place]

s



     10 mg in      44                                 mL/hr           Medical



     NaCl 0.9%                                         Administer           Bran

ch



     (NS)                                         over 30           



     piggyback                                         Minutes,           



                                                  Q6HPRN,           



                                                  Starting           



                                                  on 23 at           



                                                  1407,           



                                                  Until           



                                                  Discontinu           



                                                  ed,            



                                                  Routine,           



                                                  Nausea and           



                                                  Vomiting           



                                                  (N/V)           

 

     proCHLORper            Yes            10mg      10 mg, IV           U

nivers



     azine                                     Piggyback,           ity of



     (COMPAZINE)      20:07:                               at 100           [place]

s



     10 mg in      44                                 mL/hr           Medical



     NaCl 0.9%                                         Administer           [facility]

ch



     (NS)                                         over 30           



     piggyback                                         Minutes,           



                                                  Q6HPRN,           



                                                  Starting           



                                                  on 23 at           



                                                  1407,           



                                                  Until           



                                                  Discontinu           



                                                  ed,            



                                                  Routine,           



                                                  Nausea and           



                                                  Vomiting           



                                                  (N/V)           

 

     insulin      2023- No             10U       10 Units,           Univ

ers



     regular                                Subcutaneo           ity o

f



     human      19:00: 19:28                          , ONCE,           [place]



     (HUMULIN R)      00   :00                           1 dose, On           Me

dical



     injection                                         [facility]



     10 Units                                         23 at           



                                                  1300,           



                                                  Routine<br           



                                                  >Indicatio           



                                                  n for           



                                                  insulin:           



                                                  Hyperglyce           



                                                  jarvis            

 

     insulin      2023- No             10U       10 Units,           Univ

ers



     regular                                IV Push,           ity of



     human      16:00: 17:00                          ONCE, 1           [place]



     (HUMULIN R)      00   :00                           dose, On           Medi

[facility]



     injection                                         Tue            [facility]



     10 Units                                         23 at           



                                                  1000,           



                                                  Routine<br           



                                                  >Indicatio           



                                                  n for           



                                                  insulin:           



                                                  Hyperglyce           



                                                  jarvis            

 

     pantoprazol            Yes            40mg      40 mg,           Univ

ers



     e                                        Slow IV           ity of



     (PROTONIX)      15:00:                               Push,           [place]



     injection      00                                 DAILY,           Medical



     40 mg                                         First dose           [facility]



                                                  on 23 at           



                                                  0900,           



                                                  Until           



                                                  Discontinu           



                                                  ed             

 

     sennosides-            Yes            1{tbl}      1 tablet,          

 [facility]



     docusate                                     Oral,           ity of



     sodium      15:00:                               DAILY,           [place]



     (SENOKOT-S)      00                                 First dose           Me

dical



     8.6-50 mg                                         on Tue           [facility]



     per tablet                                         23 at           



     1 tablet                                         0900,           



                                                  Until           



                                                  Discontinu           



                                                  ed,            



                                                  Routine           

 

     pantoprazol            Yes            40mg      40 mg,           Univ

ers



     e                                        Slow IV           ity of



     (PROTONIX)      15:00:                               Push,           [place]



     injection      00                                 DAILY,           Medical



     40 mg                                         First dose           [facility]



                                                  on 23 at           



                                                  0900,           



                                                  Until           



                                                  Discontinu           



                                                  ed             

 

     sennosides-            Yes            1{tbl}      1 tablet,          

 [facility]



     docusate                                     Oral,           ity of



     sodium      15:00:                               DAILY,           [place]



     (SENOKOT-S)      00                                 First dose           Me

dical



     8.6-50 mg                                         on Tue           [facility]



     per tablet                                         23 at           



     1 tablet                                         0900,           



                                                  Until           



                                                  Discontinu           



                                                  ed,            



                                                  Routine           

 

     insulin NPH      2023- No             20U       20 Units,           

[facility]



     (HUMULIN N)                                Subcutaneo           i

ty of



     injection      15:00: 18:00                          ,            [place]



     20 Units      00   :37                           QAM+HS,           Medical



                                                  First dose           [facility]



                                                  on 23 at           



                                                  0900,           



                                                  Until           



                                                  Discontinu           



                                                  ed,            



                                                  Routine           

 

     Sliding            Yes                      Subcutaneo           Univ

ers



     Scale      2-21                               us, TID           ity of



     Insulin -      14:00:                               MEALS+HS,           [facility]

as



     Lispro      00                                 First dose           Medical



     (HumaLOG) +                                         on Tue           [facility]



     Fsbg                                         23 at           



     Testing                                         0800,           



                                                  Until           



                                                  Discontinu           



                                                  ed,            



                                                  Routine           

 

     Sliding            Yes                      Subcutaneo           Univ

ers



     Scale      2-21                               us, TID           ity of



     Insulin -      14:00:                               MEALS+HS,           [facility]

as



     Lispro      00                                 First dose           Medical



     (HumaLOG) +                                         on Tue           [facility]



     Fsbg                                         23 at           



     Testing                                         0800,           



                                                  Until           



                                                  Discontinu           



                                                  ed,            



                                                  Routine           

 

     HYDROmorpho      2023- No             .5mg      0.5 mg,           Un

[facility]



     ne                                  Slow IV           ity of



     (DILAUDID)      09:57: 03:41                          Push,           [place]



     injection      20   :06                           Q4HPRN,           Medical



     0.5 mg                                         Starting           [facility]



                                                  on 23 at           



                                                  0357,           



                                                  Until 23 at           



                                                  2141,           



                                                  Routine,           



                                                  Pain           



                                                  (scale           



                                                  7-10)<br>U           



                                                  se             



                                                  approved           



                                                  by             



                                                  (Faculty):           



                                                  [facility]            



                                                  PROVIDER           

 

     HYDROmorpho      2023-0      Yes            4mg       4 mg,           Unive

rs



     ne                                       Oral,           ity of



     (DILAUDID)      09:57:                               Q4HPRN,           [place]

[facility]



     tablet 4 mg      04                                 Starting           Medi

[facility]



                                                  on Tue           [facility]



                                                  23 at           



                                                  0357,           



                                                  Until           



                                                  Discontinu           



                                                  ed,            



                                                  Routine,           



                                                  Pain           



                                                  (scale           



                                                  4-6)           

 

     HYDROmorpho      2023-0      Yes            4mg       4 mg,           Unive

rs



     ne                                       Oral,           ity of



     (DILAUDID)      09:57:                               Q4HPRN,           [place]

[facility]



     tablet 4 mg      04                                 Starting           Medi

[facility]



                                                  on Tue           [facility]



                                                  23 at           



                                                  0357,           



                                                  Until           



                                                  Discontinu           



                                                  ed,            



                                                  Routine,           



                                                  Pain           



                                                  (scale           



                                                  4-6)           

 

     FENTanyl PF      3-0 - No             50ug      50 mcg,           Un

[facility]



     (SUBLIMAZE                                Slow IV           ity o

f



     (PF))      09:17: 09:58                          Push,           [place]



     injection      04   :01                           Q4HPRN,           Medical



     50 mcg                                         Starting           [facility]



                                                  on 23 at           



                                                  0317,           



                                                  Until 23 at           



                                                  0358,           



                                                  Routine,           



                                                  Pain           



                                                  (scale           



                                                  7-10)           

 

     NaCl 0.9%      3-0      Yes            1000mL      at 125           Univ

ers



     (NS) IV      2-21                               mL/hr, IV           ity of



     infusion      09:00:                               Infusion,           [place]

[facility]



     1,000 mL      00                                 CONTINUOUS           Medic

al



                                                  , Starting           [facility]



                                                  on 23 at           



                                                  0300,           



                                                  Until           



                                                  Discontinu           



                                                  ed,            



                                                  Routine           

 

     NaCl 0.9%      2023-0      Yes            1000mL      at 125           Univ

ers



     (NS) IV      2-21                               mL/hr, IV           ity of



     infusion      09:00:                               Infusion,           [place]

[facility]



     1,000 mL      00                                 CONTINUOUS           Medic

al



                                                  , Starting           [facility]



                                                  on 23 at           



                                                  0300,           



                                                  Until           



                                                  Discontinu           



                                                  ed,            



                                                  Routine           

 

     glucagon      -0      Yes            1mg       1 mg,           [facility]



     (GLUCAGEN                                     Intramuscu           ity 

of



     DIAGNOSTIC      08:53:                               lar, PRN,           [facility]

[place]



     KIT)      52                                 Starting           Medical



     injection 1                                         on Tue           [facility]



     mg                                           23 at           



                                                  0253,           



                                                  Until           



                                                  Discontinu           



                                                  ed, ASAP,           



                                                  Blood           



                                                  Glucose           



                                                  &amp;lt;           



                                                  or = 70           



                                                  mg/dL and           



                                                  patient is           



                                                  NPO,           



                                                  unable to           



                                                  swallow or           



                                                  has mental           



                                                  changes.           

 

     dextrose 50      -0      Yes            25mL      25 mL,           Univ

ers



     % in water      2-21                               Slow IV           ity of



     (D50W)      08:53:                               Push, PRN,           [place]



     injection      52                                 Starting           Medica

l



     25 mL                                         on e           [facility]



                                                  23 at           



                                                  0253,           



                                                  Until           



                                                  Discontinu           



                                                  ed, ASAP,           



                                                  Blood           



                                                  Glucose           



                                                  &amp;lt;           



                                                  or = 70           



                                                  mg/dL and           



                                                  patient is           



                                                  NPO,           



                                                  unable to           



                                                  swallow or           



                                                  has mental           



                                                  status           



                                                  changes.           

 

     glucagon      2023-0      Yes            1mg       1 mg,           [facility]



     (GLUCAGEN                                     Intramuscu           ity 

of



     DIAGNOSTIC      08:53:                               lar, PRN,           [facility]

[place]



     KIT)      52                                 Starting           Medical



     injection 1                                         on Tue           [facility]



     mg                                           23 at           



                                                  0253,           



                                                  Until           



                                                  Discontinu           



                                                  ed, ASAP,           



                                                  Blood           



                                                  Glucose           



                                                  &amp;lt;           



                                                  or = 70           



                                                  mg/dL and           



                                                  patient is           



                                                  NPO,           



                                                  unable to           



                                                  swallow or           



                                                  has mental           



                                                  changes.           

 

     dextrose 50      2023-0      Yes            25mL      25 mL,           Univ

ers



     % in water      2-                               Slow IV           ity of



     (D50W)      08:53:                               Push, PRN,           [place]



     injection      52                                 Starting           Medica

l



     25 mL                                         on Tue           [facility]



                                                  23 at           



                                                  0253,           



                                                  Until           



                                                  Discontinu           



                                                  ed, ASAP,           



                                                  Blood           



                                                  Glucose           



                                                  &amp;lt;           



                                                  or = 70           



                                                  mg/dL and           



                                                  patient is           



                                                  NPO,           



                                                  unable to           



                                                  swallow or           



                                                  has mental           



                                                  status           



                                                  changes.           

 

     ondansetron      2023-0      Yes            4mg       4 mg, Slow           

[facility]



     (ZOFRAN      2-21                               IV Push,           ity of



     (PF))      08:53:                               Q6HPRN,           [place]



     injection 4      45                                 Starting           Medi

[facility]



     mg                                           on Tue           [facility]



                                                  23 at           



                                                  0253,           



                                                  Until           



                                                  Discontinu           



                                                  ed,            



                                                  Routine,           



                                                  Nausea and           



                                                  Vomiting           



                                                  (N/V)           

 

     ondansetron      2023-0      Yes            4mg       4 mg, Slow           

[facility]



     (ZOFRAN      2-21                               IV Push,           ity of



     (PF))      08:53:                               Q6HPRN,           [place]



     injection 4      45                                 Starting           Medi

sar[facility]



     mg                                           on Tue           [facility]



                                                  23 at           



                                                  0253,           



                                                  Until           



                                                  Discontinu           



                                                  ed,            



                                                  Routine,           



                                                  Nausea and           



                                                  Vomiting           



                                                  (N/V)           

 

     acetaminoph      2023-0      Yes            650mg      650 mg,           Un

yoselyn



     en        2                               Oral,           ity of



     (TYLENOL)      08:53:                               Q6HPRN,           [place]



     tablet 650      15                                 Starting           Medic

al



     mg                                           on Tue           Branch



                                                  23 at           



                                                  0253,           



                                                  Until           



                                                  Discontinu           



                                                  ed,            



                                                  Routine,           



                                                  Pain           



                                                  (scale           



                                                  1-3)           

 

     acetaminoph            Yes            650mg      650 mg,           Un

yoselyn



     en                                       Oral,           ity of



     (TYLENOL)      08:53:                               Q6HPRN,           [place]



     tablet 650      15                                 Starting           Medic

al



     mg                                           on Tu           [facility]anch



                                                  23 at           



                                                  0253,           



                                                  Until           



                                                  Discontinu           



                                                  ed,            



                                                  Routine,           



                                                  Pain           



                                                  (scale           



                                                  1-3)           

 

     insulin      2023- No             10U       10 Units,           Univ

ers



     regular                                IV Push,           ity of



     human      06:30: 05:53                          ONCE, 1           [place]



     (HUMULIN R)      00   :00                           dose, On           Medi

sarah



     injection                                         Tue            [facility]anch



     10 Units                                         23 at           



                                                  0030,           



                                                  Routine<br           



                                                  >Indicatio           



                                                  n for           



                                                  insulin:           



                                                  Hyperglyce           



                                                  jarvis            

 

     NaCl 0.9%      2023- No             1000mL      at 999           Uni

vers



     (NS) bolus                                mL/hr,           ity of



     infusion      06:00: 06:53                          1,000 mL,           Eric

as



     1,000 mL      00   :00                           IV             Medical



                                                  Infusion,           [facility]anch



                                                  ONCE, 1           



                                                  dose, On           



                                                  23 at           



                                                  0000, ASA           

 

     insulin      2023- No             10U       10 Units,           Univ

ers



     regular                                Slow IV           ity of



     human      05:00: 04:15                          Push,           [place]



     (HUMULIN R)      00   :00                           ONCE, 1           Medic

al



     injection                                         dose, On           [facility]anch



     10 Units                                         Mon            



                                                  23 at           



                                                  2300,           



                                                  Routine<br           



                                                  >Indicatio           



                                                  n for           



                                                  insulin:           



                                                  Hyperglyce           



                                                  jarvis            

 

     NaCl 0.9%      2023- No             1000mL      at 999           Uni

vers



     (NS) bolus                                mL/hr,           ity of



     infusion      04:45: 05:33                          1,000 mL,           Eric

as



     1,000 mL      00   :00                           IV             Medical



                                                  Infusion,           [facility]



                                                  ONCE, 1           



                                                  dose, On           



                                                  Mon            



                                                  23 at           



                                                  2245, ASAP           

 

     ibuprofen      -2023- No             600mg      600 mg,           Uni

vers



     (IBU)                                Oral,           ity of



     tablet 600      04:09: 04:21                          ONCE, 1           Eric

as



     mg        00   :00                           dose, On           Medical



                                                  Mon            Branch



                                                  23 at           



                                                  2215, ASAP           

 

     cefTRIAXone      -2023- No             1000mg      1,000 mg,         

  [facility]



     (ROCEPHIN)      2-21 02-21                          IV             ity of



     1,000 mg in      03:45: 04:34                          [place], [place]



     NaCl 0.9%      00   :00                           ONCE, 1           Medical



     (NS) 100 mL                                         dose, On           [facility]

ch



     MINI-BAG                                         Mon            



                                                  23 at           



                                                  2145,           



                                                  Administer           



                                                  over 30           



                                                  Minutes,           



                                                  100            



                                                  mL<br>Reas           



                                                  on for           



                                                  Anti-Infec           



                                                  tive:           



                                                  Documented           



                                                  Infection<           



                                                  br>Documen           



                                                  huma            



                                                  Infection           



                                                  Site:           



                                                  Urine<br&g           



                                                  t;Duration           



                                                  of             



                                                  Therapy:           



                                                  Other (see           



                                                  Comments)           

 

     HYDROmorpho      -0      Yes            4mg       Take 4 mg           U

nivers



     ne 4 mg      2-21                               by mouth           ity of



     tablet      01:46:                               in the           [place]



               16                                 morning           Medical



                                                  and 4 mg           [facility]



                                                  at noon           



                                                  and 4 mg           



                                                  in the           



                                                  evening.           

 

     insulin NPH      -0      Yes            45U       inject 45           U

nivers



     human      2-21                               Units           ity of



     isophane      01:46:                               under the           [place]

s



     (NOVOLIN N      16                                 skin 2           Medical



     SC)                                          (two)           [facility]



                                                  times           



                                                  daily.           

 

     flash      3-0      Yes       [id number] 1{kit}      1 Kit           Unive

rs



     glucose      2-14                               daily.           ity of



     scanning      00:00:                                              [place]



     reader      00                                                Medical



     (FREESTYLE                                                        Branch



     DENISE 2                                                        



     READER)                                                        



     Misc                                                        

 

     flash      2023-0      Yes       [id number] 1{kit}      1 Kit           Unive

rs



     glucose      2-14                               every 14           ity of



     sensor      00:00:                               (fourteen)           [place]



     (FREESTYLE      00                                 days.           Medical



     DENISE 2                                                        Branch



     SENSOR) Kit                                                        

 

     flash      2023-0      Yes       [id number] 1{kit}      1 Kit           Unive

rs



     glucose      2-14                               daily.           ity of



     scanning      00:00:                                              [place]



     reader      00                                                Medical



     (FREESTYLE                                                        Branch



     DENISE 2                                                        



     READER)                                                        



     Misc                                                        

 

     flash      2023-0      Yes       [id number] 1{kit}      1 Kit           Unive

rs



     glucose      2-14                               every 14           ity of



     sensor      00:00:                               (fourteen)           [place]



     (FREESTYLE      00                                 days.           Medical



     DENISE 2                                                        Branch



     SENSOR) Kit                                                        

 

     flash      2023-0      Yes       [id number] 1{kit}      1 Kit           Unive

rs



     glucose      2-14                               daily.           ity of



     scanning      00:00:                                              [place]



     reader      00                                                Medical



     (FREESTYLE                                                        Branch



     DENISE 2                                                        



     READER)                                                        



     Misc                                                        

 

     flash      2023-0      Yes       [id number] 1{kit}      1 Kit           Unive

rs



     glucose      2-14                               every 14           ity of



     sensor      00:00:                               (fourteen)           [place]



     (FREESTYLE      00                                 days.           Medical



     DENISE 2                                                        Branch



     SENSOR) Kit                                                        

 

     flash      2023-0      Yes       [id number] 1{kit}      1 Kit           Unive

rs



     glucose      2-14                               daily.           ity of



     scanning      00:00:                                              [place]



     reader      00                                                Medical



     (FREESTYLE                                                        Branch



     DENISE 2                                                        



     READER)                                                        



     Misc                                                        

 

     flash      2023-0      Yes       [id number] 1{kit}      1 Kit           Unive

rs



     glucose      2-14                               every 14           ity of



     sensor      00:00:                               (fourteen)           [place]



     (FREESTYLE      00                                 days.           Medical



     DENISE 2                                                        Branch



     SENSOR) Kit                                                        

 

     flash      2023-0      Yes       [id number] 1{kit}      1 Kit           Unive

rs



     glucose      2-14                               daily.           ity of



     scanning      00:00:                                              [place]



     reader      00                                                Medical



     (FREESTYLE                                                        Branch



     DENISE 2                                                        



     READER)                                                        



     Misc                                                        

 

     flash      2023-0      Yes       [id number] 1{kit}      1 Kit           Unive

rs



     glucose      2-14                               every 14           ity of



     sensor      00:00:                               (fourteen)           [place]



     (FREESTYLE      00                                 days.           Medical



     DENISE 2                                                        Branch



     SENSOR) Kit                                                        

 

     flash      2023-0      Yes       [id number] 1{kit}      1 Kit           Unive

rs



     glucose      2-14                               daily.           ity of



     scanning      00:00:                                              [place]



     reader      00                                                Medical



     (FREESTYLE                                                        Branch



     DENISE 2                                                        



     READER)                                                        



     Misc                                                        

 

     flash      2023-0      Yes       [id number] 1{kit}      1 Kit           Unive

rs



     glucose      2-14                               every 14           ity of



     sensor      00:00:                               (fourteen)           [place]



     (FREESTYLE      00                                 days.           Medical



     DENISE 2                                                        Branch



     SENSOR) Kit                                                        

 

     flash      2023-0      Yes       [id number] 1{kit}      1 Kit           Unive

rs



     glucose      2-14                               daily.           ity of



     scanning      00:00:                                              [place]



     reader      00                                                Medical



     (FREESTYLE                                                        Branch



     DENISE 2                                                        



     READER)                                                        



     Misc                                                        

 

     flash      2023-0      Yes       [id number] 1{kit}      1 Kit           Unive

rs



     glucose      2-14                               every 14           ity of



     sensor      00:00:                               (fourteen)           [place]



     (FREESTYLE      00                                 days.           Medical



     DENISE 2                                                        Branch



     SENSOR) Kit                                                        

 

     flash      2023-0      Yes       [id number] 1{kit}      1 Kit           Unive

rs



     glucose      2-14                               daily.           ity of



     scanning      00:00:                                              [place]



     reader      00                                                Medical



     (FREESTYLE                                                        Branch



     DENISE 2                                                        



     READER)                                                        



     Misc                                                        

 

     flash      2023-0      Yes       [id number] 1{kit}      1 Kit           Unive

rs



     glucose      2-14                               every 14           ity of



     sensor      00:00:                               (fourteen)           [place]



     (FREESTYLE      00                                 days.           Medical



     DENISE 2                                                        Branch



     SENSOR) Kit                                                        

 

     flash      2023-0      Yes       [id number] 1{kit}      1 Kit           Unive

rs



     glucose      2-14                               daily.           ity of



     scanning      00:00:                                              [place]



     reader      00                                                Medical



     (FREESTYLE                                                        Branch



     DENISE 2                                                        



     READER)                                                        



     Misc                                                        

 

     flash      2023-0      Yes       [id number] 1{kit}      1 Kit           Unive

rs



     glucose      2-14                               every 14           ity of



     sensor      00:00:                               (fourteen)           [place]



     (FREESTYLE      00                                 days.           Medical



     DENISE 2                                                        Branch



     SENSOR) Kit                                                        

 

     flash      2023-0      Yes       [id number] 1{kit}      1 Kit           Unive

rs



     glucose      2-14                               daily.           ity of



     scanning      00:00:                                              [place]



     reader      00                                                Medical



     (FREESTYLE                                                        Branch



     DENISE 2                                                        



     READER)                                                        



     Misc                                                        

 

     flash      2023-0      Yes       [id number] 1{kit}      1 Kit           Unive

rs



     glucose      2-14                               every 14           ity of



     sensor      00:00:                               (fourteen)           [place]



     (FREESTYLE      00                                 days.           Medical



     DENISE 2                                                        Branch



     SENSOR) Kit                                                        

 

     flash      2023-0      Yes       [id number] 1{kit}      1 Kit           Unive

rs



     glucose      2-14                               daily.           ity of



     scanning      00:00:                                              [place]



     reader      00                                                Medical



     (FREESTYLE                                                        Branch



     DENISE 2                                                        



     READER)                                                        



     Misc                                                        

 

     flash      2023-0      Yes       [id number] 1{kit}      1 Kit           Unive

rs



     glucose      2-14                               every 14           ity of



     sensor      00:00:                               (fourteen)           [place]



     (FREESTYLE      00                                 days.           Medical



     DENISE 2                                                        Branch



     SENSOR) Kit                                                        

 

     flash      2023-0      Yes       [id number] 1{kit}      1 Kit           Unive

rs



     glucose      2-14                               daily.           ity of



     scanning      00:00:                                              [place]



     reader      00                                                Medical



     (FREESTYLE                                                        Branch



     DENISE 2                                                        



     READER)                                                        



     Misc                                                        

 

     flash      2023-0      Yes       [id number] 1{kit}      1 Kit           Unive

rs



     glucose      2-14                               every 14           ity of



     sensor      00:00:                               (fourteen)           [place]



     (FREESTYLE      00                                 days.           Medical



     DENISE 2                                                        Branch



     SENSOR) Kit                                                        

 

     flash      2023-0      Yes       [id number] 1{kit}      1 Kit           Unive

rs



     glucose      2-14                               daily.           ity of



     scanning      00:00:                                              [place]



     reader      00                                                Medical



     (FREESTYLE                                                        Branch



     DENISE 2                                                        



     READER)                                                        



     Misc                                                        

 

     flash      2023-0      Yes       [id number] 1{kit}      1 Kit           Unive

rs



     glucose      2-14                               every 14           ity of



     sensor      00:00:                               (fourteen)           [place]



     (FREESTYLE      00                                 days.           Medical



     DENISE 2                                                        Branch



     SENSOR) Kit                                                        

 

     flash      2023-0      Yes       [id number] 1{kit}      1 Kit           Unive

rs



     glucose      2-14                               daily.           ity of



     scanning      00:00:                                              [place]



     reader      00                                                Medical



     (FREESTYLE                                                        Branch



     DENISE 2                                                        



     READER)                                                        



     Misc                                                        

 

     flash      2023-0      Yes       [id number] 1{kit}      1 Kit           [facility]

rs



     glucose      2-14                               every 14           ity of



     sensor      00:00:                               (fourteen)           [place]



     (FREESTYLE      00                                 days.           Medical



     [facility] 2                                                        [facility]anch



     SENSOR) Kit                                                        

 

     flash      2023-0      Yes       [id number] 1{kit}      1 Kit           [facility]

rs



     glucose      2-14                               daily.           ity of



     scanning      00:00:                                              [place]



     reader      00                                                Medical



     (FREESTYLE                                                        Branch



     DENISE 2                                                        



     READER)                                                        



     Misc                                                        

 

     flash      2023-0      Yes       [id number] 1{kit}      1 Kit           [facility]

rs



     glucose      2-14                               every 14           ity of



     sensor      00:00:                               (fourteen)           [place]



     (FREESTYLE      00                                 days.           Medical



     [facility] 2                                                        Branch



     SENSOR) Kit                                                        

 

     flash      2023-0      Yes       [id number] 1{kit}      1 Kit           [facility]

rs



     glucose      2-14                               daily.           ity of



     scanning      00:00:                                              [place]



     reader      00                                                Medical



     (FREESTYLE                                                        Branch



     DENISE 2                                                        



     READER)                                                        



     Misc                                                        

 

     flash      2023-0      Yes       [id number] 1{kit}      1 Kit           [facility]

rs



     glucose      2-14                               every 14           ity of



     sensor      00:00:                               (fourteen)           [place]



     (FREESTYLE      00                                 days.           Medical



     [facility] 2                                                        Branch



     SENSOR) Kit                                                        

 

     flash      2023-0      Yes       [id number] 1{kit}      1 Kit           [facility]

rs



     glucose      2-14                               daily.           ity of



     scanning      00:00:                                              [place]



     reader      00                                                Medical



     (FREESTYLE                                                        Branch



     DENISE 2                                                        



     READER)                                                        



     Misc                                                        

 

     flash      2023-0      Yes       [id number] 1{kit}      1 Kit           [facility]

rs



     glucose      2-14                               every 14           ity of



     sensor      00:00:                               (fourteen)           [place]



     (FREESTYLE      00                                 days.           Medical



     [facility] 2                                                        Branch



     SENSOR) Kit                                                        

 

     flash      2023-0      Yes       [id number] 1{kit}      1 Kit           [facility]

rs



     glucose      2-14                               daily.           ity of



     scanning      00:00:                                              [place]



     reader      00                                                Medical



     (FREESTYLE                                                        Branch



     DENISE 2                                                        



     READER)                                                        



     Misc                                                        

 

     flash      2023-0      Yes       [id number] 1{kit}      1 Kit           [facility]

rs



     glucose      2-14                               every 14           ity of



     sensor      00:00:                               (fourteen)           [place]



     (FREESTYLE      00                                 days.           Medical



     [facility] 2                                                        Branch



     SENSOR) Kit                                                        

 

     flash      2023-0      Yes       [id number] 1{kit}      1 Kit           [facility]

rs



     glucose      2-14                               daily.           ity of



     scanning      00:00:                                              [place]



     reader      00                                                Medical



     (FREESTYLE                                                        Branch



     DENISE 2                                                        



     READER)                                                        



     Misc                                                        

 

     flash      2023-0      Yes       [id number] 1{kit}      1 Kit           [facility]

rs



     glucose      2-14                               every 14           ity of



     sensor      00:00:                               (fourteen)           [place]



     (FREESTYLE      00                                 days.           Medical



     [facility] 2                                                        Branch



     SENSOR) Kit                                                        

 

     flash      2023-0      Yes       [id number] 1{kit}      1 Kit           [facility]

rs



     glucose      2-14                               daily.           ity of



     scanning      00:00:                                              [place]



     reader      00                                                Medical



     (FREESTYLE                                                        Branch



     DENISE 2                                                        



     READER)                                                        



     Misc                                                        

 

     flash      2023-0      Yes       [id number] 1{kit}      1 Kit           [facility]

rs



     glucose      2-14                               every 14           ity of



     sensor      00:00:                               (fourteen)           [place]



     (FREESTYLE      00                                 days.           Medical



     [facility] 2                                                        Branch



     SENSOR) Kit                                                        

 

     flash      2023-0      Yes       [id number] 1{kit}      1 Kit           [facility]

rs



     glucose      2-14                               daily.           ity of



     scanning      00:00:                                              [place]



     reader      00                                                Medical



     (FREESTYLE                                                        Branch



     DEN[facility] 2                                                        



     READER)                                                        



     Misc                                                        

 

     flash      2023-0      Yes       [id number] 1{kit}      1 Kit           [facility]

rs



     glucose      2-14                               every 14           ity of



     sensor      00:00:                               (fourteen)           [place]



     (FREESTYLE      00                                 days.           Medical



     [facility] 2                                                        Branch



     SENSOR) Kit                                                        

 

     flash      2023-0      Yes       [id number] 1{kit}      1 Kit           [facility]

rs



     glucose      2-14                               daily.           ity of



     scanning      00:00:                                              [place]



     reader      00                                                Medical



     (FREESTYLE                                                        Branch



     DENISE 2                                                        



     READER)                                                        



     Misc                                                        

 

     flash      2023-0      Yes       [id number] 1{kit}      1 Kit           [facility]

rs



     glucose      2-14                               every 14           ity of



     sensor      00:00:                               (fourteen)           [place]



     (FREESTYLE      00                                 days.           Medical



     [facility] 2                                                        Branch



     SENSOR) Kit                                                        

 

     flash      2023-0      Yes       [id number] 1{kit}      1 Kit           [facility]

rs



     glucose      2-14                               daily.           ity of



     scanning      00:00:                                              [place]



     reader      00                                                Medical



     (FREESTYLE                                                        Branch



     DENISE 2                                                        



     READER)                                                        



     Misc                                                        

 

     flash      2023-0      Yes       [id number] 1{kit}      1 Kit           [facility]

rs



     glucose      2-14                               every 14           ity of



     sensor      00:00:                               (fourteen)           [place]



     (FREESTYLE      00                                 days.           Medical



     [facility] 2                                                        Branch



     SENSOR) Kit                                                        

 

     flash      2023-0      Yes       [id number] 1{kit}      1 Kit           [facility]

rs



     glucose      2-14                               daily.           ity of



     scanning      00:00:                                              [place]



     reader      00                                                Medical



     (FREESTYLE                                                        Branch



     DENISE 2                                                        



     READER)                                                        



     Misc                                                        

 

     flash      2023-0      Yes       [id number] 1{kit}      1 Kit           [facility]

rs



     glucose      2-14                               every 14           ity of



     sensor      00:00:                               (fourteen)           [place]



     (FREESTYLE      00                                 days.           Medical



     [facility] 2                                                        Branch



     SENSOR) Kit                                                        

 

     flash      2023-0      Yes       [id number] 1{kit}      1 Kit           [facility]

rs



     glucose      2-14                               daily.           ity of



     scanning      00:00:                                              [place]



     reader      00                                                Medical



     (FREESTYLE                                                        Branch



     DENISE 2                                                        



     READER)                                                        



     Misc                                                        

 

     flash      2023-0      Yes       [id number] 1{kit}      1 Kit           [facility]

rs



     glucose      2-14                               every 14           ity of



     sensor      00:00:                               (fourteen)           [place]



     (FREESTYLE      00                                 days.           Medical



     [facility] 2                                                        Branch



     SENSOR) Kit                                                        

 

     flash      2023-0      Yes       [id number] 1{kit}      1 Kit           [facility]

rs



     glucose      2-14                               daily.           ity of



     scanning      00:00:                                              [place]



     reader      00                                                Medical



     (FREESTYLE                                                        Branch



     DENISE 2                                                        



     READER)                                                        



     Misc                                                        

 

     flash      2023-0      Yes       [id number] 1{kit}      1 Kit           [facility]

rs



     glucose      2-14                               every 14           ity of



     sensor      00:00:                               (fourteen)           [place]



     (FREESTYLE      00                                 days.           Medical



     [facility] 2                                                        Branch



     SENSOR) Kit                                                        

 

     flash      2023-0      Yes       [id number] 1{kit}      1 Kit           [facility]

rs



     glucose      2-14                               daily.           ity of



     scanning      00:00:                                              [place]



     reader      00                                                Medical



     (FREESTYLE                                                        Branch



     DENISE 2                                                        



     READER)                                                        



     Misc                                                        

 

     flash      2023-0      Yes       [id number] 1{kit}      1 Kit           [facility]

rs



     glucose      2-14                               every 14           ity of



     sensor      00:00:                               (fourteen)           [place]



     (FREESTYLE      00                                 days.           Medical



     [facility] 2                                                        Branch



     SENSOR) Kit                                                        

 

     flash      2023-0      Yes       [id number] 1{kit}      1 Kit           [facility]

rs



     glucose      2-14                               daily.           ity of



     scanning      00:00:                                              [place]



     reader      00                                                Medical



     (FREESTYLE                                                        Branch



     DENISE 2                                                        



     READER)                                                        



     Misc                                                        

 

     flash      2023-0      Yes       [id number] 1{kit}      1 Kit           [facility]

rs



     glucose      2-14                               every 14           ity of



     sensor      00:00:                               (fourteen)           [place]



     (FREESTYLE      00                                 days.           Medical



     [facility] 2                                                        Branch



     SENSOR) Kit                                                        

 

     flash      2023-0      Yes       [id number] 1{kit}      1 Kit           [facility]

rs



     glucose      2-14                               daily.           ity of



     scanning      00:00:                                              [place]



     reader      00                                                Medical



     (FREESTYLE                                                        Branch



     DENISE 2                                                        



     READER)                                                        



     Misc                                                        

 

     flash      2023-0      Yes       [id number] 1{kit}      1 Kit           [facility]

rs



     glucose      2-14                               every 14           ity of



     sensor      00:00:                               (fourteen)           [place]



     (FREESTYLE      00                                 days.           Medical



     [facility] 2                                                        Branch



     SENSOR) Kit                                                        

 

     flash      2023-0      Yes       [id number] 1{kit}      1 Kit           [facility]

rs



     glucose      2-14                               daily.           ity of



     scanning      00:00:                                              [place]



     reader      00                                                Medical



     (FREESTYLE                                                        Branch



     DENISE 2                                                        



     READER)                                                        



     Misc                                                        

 

     flash      2023-0      Yes       [id number] 1{kit}      1 Kit           [facility]

rs



     glucose      2-14                               every 14           ity of



     sensor      00:00:                               (fourteen)           [place]



     (FREESTYLE      00                                 days.           Medical



     [facility] 2                                                        Branch



     SENSOR) Kit                                                        

 

     flash      2023-0      Yes       [id number] 1{kit}      1 Kit           [facility]

rs



     glucose      2-14                               daily.           ity of



     scanning      00:00:                                              [place]



     reader      00                                                Medical



     (FREESTYLE                                                        Branch



     DENISE 2                                                        



     READER)                                                        



     Misc                                                        

 

     flash      2023-0      Yes       [id number] 1{kit}      1 Kit           [facility]

rs



     glucose      2-14                               every 14           ity of



     sensor      00:00:                               (fourteen)           [place]



     (FREESTYLE      00                                 days.           Medical



     [facility] 2                                                        Branch



     SENSOR) Kit                                                        

 

     flash      2023-0      Yes       [id number] 1{kit}      1 Kit           [facility]

rs



     glucose      2-14                               daily.           ity of



     scanning      00:00:                                              [place]



     reader      00                                                Medical



     (FREESTYLE                                                        Branch



     DENISE 2                                                        



     READER)                                                        



     Misc                                                        

 

     flash      2023-0      Yes       [id number] 1{kit}      1 Kit           [facility]

rs



     glucose      2-14                               every 14           ity of



     sensor      00:00:                               (fourteen)           [place]



     (FREESTYLE      00                                 days.           Medical



     [facility] 2                                                        Branch



     SENSOR) Kit                                                        

 

     flash      2023-0      Yes       [id number] 1{kit}      1 Kit           [facility]

rs



     glucose      2-14                               daily.           ity of



     scanning      00:00:                                              [place]



     reader      00                                                Medical



     (FREESTYLE                                                        Branch



     DENISE 2                                                        



     READER)                                                        



     Misc                                                        

 

     flash      2023-0      Yes       [id number] 1{kit}      1 Kit           [facility]

rs



     glucose      2-14                               every 14           ity of



     sensor      00:00:                               (fourteen)           [place]



     (FREESTYLE      00                                 days.           Medical



     [facility] 2                                                        Branch



     SENSOR) Kit                                                        

 

     flash      2023-0      Yes       [id number] 1{kit}      1 Kit           [facility]

rs



     glucose      2-14                               daily.           ity of



     scanning      00:00:                                              [place]



     reader      00                                                Medical



     (FREESTYLE                                                        Branch



     DENISE 2                                                        



     READER)                                                        



     Misc                                                        

 

     flash      2023-0      Yes       [id number] 1{kit}      1 Kit           [facility]

rs



     glucose      2-14                               every 14           ity of



     sensor      00:00:                               (fourteen)           [place]



     (FREESTYLE      00                                 days.           Medical



     [facility] 2                                                        Branch



     SENSOR) Kit                                                        

 

     HYDROcodone      -0 2023- No             1{tbl}      1 tablet,         

  [facility]



     -acetaminop                                Oral,           ity of



     hen (NORCO)      21:15: 20:14                          ONCE, 1           [facility]

[place]



      mg      00   :00                           dose, On           [facility]

l



     tablet 1                                         Mon            [facility]



     tablet                                         23 at           



                                                  1515,           



                                                  Routine           

 

     flash      3-0      Yes       [id number] 1{kit}      1 Kit           [facility]

rs



     glucose      2-12                               every 14           ity of



     sensor      00:00:                               (fourteen)           [place]



     (FREESTYLE      00                                 days.           Medical



     [facility] 2                                                        Branch



     SENSOR) Kit                                                        

 

     flash      3-0      Yes       [id number] 1{kit}      1 Kit           [facility]

rs



     glucose      2-12                               daily.           ity of



     scanning      00:00:                                              [place]



     reader      00                                                Medical



     (FREESTYLE                                                        Branch



     DENISE 2                                                        



     READER)                                                        



     Misc                                                        

 

     flash      3-0      Yes       [id number] 1{kit}      1 Kit           [facility]

rs



     glucose      2-12                               every 14           ity of



     sensor      00:00:                               (fourteen)           [place]



     (FREESTYLE      00                                 days.           Medical



     DENISE 2                                                        Branch



     SENSOR) Kit                                                        

 

     flash      3-0      Yes       [id number] 1{kit}      1 Kit           Unive

rs



     glucose      2-12                               daily.           ity of



     scanning      00:00:                                              [place]



     reader      00                                                Medical



     (FREESTYLE                                                        Branch



     DENISE 2                                                        



     READER)                                                        



     Misc                                                        

 

     flash      -0 2023- No        [id number] 1{kit}      1 Kit           Univ

ers



     glucose      2-12 02-14                          every 14           ity of



     sensor      00:00: 00:00                          (fourteen)           [place]

[facility]



     (FREESTYLE      00   :00                           days.           Medical



     DENISE 2                                                        Branch



     SENSOR) Kit                                                        

 

     flash      3-0 2023- No        [id number] 1{kit}      1 Kit           Univ

ers



     glucose      2-12 02-14                          daily.           ity of



     scanning      00:00: 00:00                                         [place]



     reader      00   :00                                          Medical



     (FREESTYLE                                                        Branch



     DENISE 2                                                        



     READER)                                                        



     Misc                                                        

 

     insulin      -0 2023- No             12U       12 Units,           Univ

ers



     regular      2-11 -11                          Slow IV           ity of



     human      02:00: 01:32                          Push,           [place]



     (HUMULIN R)      00   :00                           ONCE, 1           Medic

al



     injection                                         dose, On           Branch



     12 Units                                         Fri            



                                                  2/10/23 at           



                                                  2000,           



                                                  Routine<br           



                                                  >Indicatio           



                                                  n for           



                                                  insulin:           



                                                  Hyperglyce           



                                                  jarvis            

 

     cefTRIAXone      2023- No             1000mg      1,000 mg,         

  [facility]



     (ROCEPHIN)      11                          IV             ity of



     1,000 mg in      01:30: 02:39                          Piggyback,          

 [place]



     NaCl 0.9%      00   :00                           ONCE, 1           Medical



     (NS) 50 mL                                         dose, On           Branc

h



     MINI-BAG                                         Fri            



                                                  2/10/23 at           



                                                  1930,           



                                                  Administer           



                                                  over 30           



                                                  Minutes,           



                                                  50             



                                                  mL<br>Reas           



                                                  on for           



                                                  Anti-Infec           



                                                  tive:           



                                                  Documented           



                                                  Infection<           



                                                  br>Documen           



                                                  huma            



                                                  Infection           



                                                  Site:           



                                                  Urine<br&g           



                                                  t;Duration           



                                                  of             



                                                  Therapy: 7           



                                                  days           

 

     NaCl 0.9%      2023- No             1000mL      at 999           Uni

vers



     (NS) bolus      11                          mL/hr,           ity of



     infusion      00:00: 02:51                          1,000 mL,           [facility]

as



     1,000 mL      00   :00                           IV             Medical



                                                  Infusion,           Branch



                                                  ONCE, 1           



                                                  dose, On           



                                                  Fri            



                                                  2/10/23 at           



                                                  1800, ASAP           

 

     proMETHazin      2023- No             25mg      25 mg, IV           

[facility]



     e         2-10 02-10                          Piggyback,           ity of



     (PHENERGAN)      23:15: 23:59                          ONCE, 1           [facility]

[facility]



     25 mg in      00   :00                           dose, On           Medical



     NaCl 0.9%                                         Fri            Branch



     (NS) 50 mL                                         2/10/23 at           



     IV                                           1715, ASAP           



     piggyback                                                        

 

     FENTanyl PF      2023- No             50ug      50 mcg,           Un

yoselyn



     (SUBLIMAZE      2-10 02-10                          Slow IV           ity o

f



     (PF))      01:15: 00:23                          [facility],           [place]



     injection      00   :00                           ONCE, 1           Medical



     50 mcg                                         dose, On           Branch



                                                  Thu 23           



                                                  at 1915,           



                                                  Routine           

 

     insulin      -2023- No             10U       10 Units,           Univ

ers



     regular      09                          Slow IV           ity of



     human      22:45: 22:13                          [facility],           [place]



     (HUMULIN R)      00   :00                           ONCE, 1           Medic

al



     injection                                         dose, On           Branch



     10 Units                                         Thu 23           



                                                  at 1645,           



                                                  Routine<br           



                                                  >Indicatio           



                                                  n for           



                                                  insulin:           



                                                  Hyperglyce           



                                                  jarvis            

 

     NaCl 0.9%      2023- No             1000mL      at 999           Uni

vers



     (NS) bolus       02-10                          mL/hr,           ity of



     infusion      22:00: 00:01                          1,000 mL,           Eric

as



     1,000 mL      00   :00                           IV             Medical



                                                  Infusion,           Branch



                                                  ONCE, 1           



                                                  dose, On           



                                                  Thu 23           



                                                  at 1600,           



                                                  ASAP           

 

     proMETHazin      -0 - No             25mg      25 mg, IV           

[facility]



     e         -09                          Piggyback,           ity of



     (PHENERGAN)      21:30: 22:13                          ONCE, 1           [facility]

[facility]



     25 mg in      00   :00                           dose, On           Medical



     NaCl 0.9%                                         u 23           [facility]

ch



     (NS) 50 mL                                         at 1530,           



     IV                                           ASAP           



     piggyback                                                        

 

     enoxaparin      -0      Yes            40mg      40 mg,           Unive

rs



     (LOVENOX)      2-02                               Subcutaneo           ity 

of



     injection      23:00:                               us, DAILY,           [facility]

[facility]



     40 mg      00                                 First dose           Medical



                                                  on u           Branch



                                                  23 at           



                                                  1700,           



                                                  Until           



                                                  Discontinu           



                                                  ed,            



                                                  Routine           

 

     HYDROmorpho      2023-0      Yes            4mg       Take 4 mg           U

nivers



     ne 4 mg      2-02                               by mouth           ity of



     tablet      19:25:                               in the           [place]



               48                                 morning           Medical



                                                  and 4 mg           Branch



                                                  at noon           



                                                  and 4 mg           



                                                  in the           



                                                  evening.           

 

     insulin NPH      3-0      Yes            45U       inject 45           U

nivers



     human      2-02                               Units           ity of



     isophane      19:25:                               under the           [place]

[facility]



     (NOVOLIN N      48                                 skin 2           Medical



     SC)                                          (two)           Branch



                                                  times           



                                                  daily.           

 

     HYDROmorpho      2023-0      Yes            4mg       Take 4 mg           U

nivers



     ne 4 mg      2-02                               by mouth           ity of



     tablet      19:25:                               in the           [place]



               48                                 morning           Medical



                                                  and 4 mg           Branch



                                                  at noon           



                                                  and 4 mg           



                                                  in the           



                                                  evening.           

 

     insulin NPH      2023-0      Yes            45U       inject 45           U

nivers



     human      2-02                               Units           ity of



     isophane      19:25:                               under the           [place]

[facility]



     (NOVOLIN N      48                                 skin 2           Medical



     SC)                                          (two)           Branch



                                                  times           



                                                  daily.           

 

     HYDROmorpho      2023-0      Yes            4mg       Take 4 mg           U

nivers



     ne 4 mg      2-02                               by mouth           ity of



     tablet      19:25:                               in the           [place]



               48                                 morning           Medical



                                                  and 4 mg           Branch



                                                  at noon           



                                                  and 4 mg           



                                                  in the           



                                                  evening.           

 

     insulin NPH      2023-0      Yes            45U       inject 45           U

nivers



     human      2-02                               Units           ity of



     isophane      19:25:                               under the           [place]

[facility]



     (NOVOLIN N      48                                 skin 2           Medical



     SC)                                          (two)           Branch



                                                  times           



                                                  daily.           

 

     HYDROmorpho      2023-0      Yes            4mg       Take 4 mg           U

nivers



     ne 4 mg      2-02                               by mouth           ity of



     tablet      19:25:                               in the           [place]



               48                                 morning           Medical



                                                  and 4 mg           Branch



                                                  at noon           



                                                  and 4 mg           



                                                  in the           



                                                  evening.           

 

     insulin NPH      2023-0      Yes            45U       inject 45           U

nivers



     human      2-02                               Units           ity of



     isophane      19:25:                               under the           [place]

[facility]



     (NOVOLIN N      48                                 skin 2           Medical



     SC)                                          (two)           Branch



                                                  times           



                                                  daily.           

 

     HYDROmorpho      2023-0      Yes            4mg       Take 4 mg           U

nivers



     ne 4 mg      2-02                               by mouth           ity of



     tablet      19:25:                               in the           [place]



               48                                 morning           Medical



                                                  and 4 mg           Branch



                                                  at noon           



                                                  and 4 mg           



                                                  in the           



                                                  evening.           

 

     insulin NPH      2023-0      Yes            45U       inject 45           U

nivers



     human      2-02                               Units           ity of



     isophane      19:25:                               under the           [place]

[facility]



     (NOVOLIN N      48                                 skin 2           Medical



     SC)                                          (two)           Branch



                                                  times           



                                                  daily.           

 

     HYDROmorpho      2023-0      Yes            4mg       Take 4 mg           U

nivers



     ne 4 mg      2-02                               by mouth           ity of



     tablet      19:25:                               in the           [place]



               48                                 morning           Medical



                                                  and 4 mg           Branch



                                                  at noon           



                                                  and 4 mg           



                                                  in the           



                                                  evening.           

 

     insulin NPH      2023-0      Yes            45U       inject 45           U

nivers



     human      2-02                               Units           ity of



     isophane      19:25:                               under the           [place]

[facility]



     (NOVOLIN N      48                                 skin 2           Medical



     SC)                                          (two)           Branch



                                                  times           



                                                  daily.           

 

     HYDROmorpho      2023-0      Yes            4mg       Take 4 mg           U

nivers



     ne 4 mg      2-02                               by mouth           ity of



     tablet      19:25:                               in the           [place]



               48                                 morning           Medical



                                                  and 4 mg           Branch



                                                  at noon           



                                                  and 4 mg           



                                                  in the           



                                                  evening.           

 

     insulin NPH      2023-0      Yes            45U       inject 45           U

nivers



     human      2-02                               Units           ity of



     isophane      19:25:                               under the           [place]

[facility]



     (NOVOLIN N      48                                 skin 2           Medical



     SC)                                          (two)           Branch



                                                  times           



                                                  daily.           

 

     HYDROmorpho      2023-0      Yes            4mg       Take 4 mg           U

nivers



     ne 4 mg      2-02                               by mouth           ity of



     tablet      19:25:                               in the           [place]



               48                                 morning           Medical



                                                  and 4 mg           Branch



                                                  at noon           



                                                  and 4 mg           



                                                  in the           



                                                  evening.           

 

     insulin NPH      2023-0      Yes            45U       inject 45           U

nivers



     human      2-02                               Units           ity of



     isophane      19:25:                               under the           [place]

[facility]



     (NOVOLIN N      48                                 skin 2           Medical



     SC)                                          (two)           Branch



                                                  times           



                                                  daily.           

 

     HYDROmorpho      2023-0      Yes            4mg       Take 4 mg           U

nivers



     ne 4 mg      2-02                               by mouth           ity of



     tablet      19:25:                               in the           [place]



               48                                 morning           Medical



                                                  and 4 mg           Branch



                                                  at noon           



                                                  and 4 mg           



                                                  in the           



                                                  evening.           

 

     insulin NPH      2023-0      Yes            45U       inject 45           U

nivers



     human      2-02                               Units           ity of



     isophane      19:25:                               under the           [place]

[facility]



     (NOVOLIN N      48                                 skin 2           Medical



     SC)                                          (two)           Branch



                                                  times           



                                                  daily.           

 

     HYDROmorpho      2023-0      Yes            4mg       Take 4 mg           U

nivers



     ne 4 mg      2-02                               by mouth           ity of



     tablet      19:25:                               in the           [place]



               48                                 morning           Medical



                                                  and 4 mg           Branch



                                                  at noon           



                                                  and 4 mg           



                                                  in the           



                                                  evening.           

 

     insulin NPH      2023-0      Yes            45U       inject 45           U

nivers



     human      2-02                               Units           ity of



     isophane      19:25:                               under the           [place]

[facility]



     (NOVOLIN N      48                                 skin 2           Medical



     SC)                                          (two)           Branch



                                                  times           



                                                  daily.           

 

     HYDROmorpho      2023-0      Yes            4mg       Take 4 mg           U

nivers



     ne 4 mg      2-02                               by mouth           ity of



     tablet      19:25:                               in the           [place]



               48                                 morning           Medical



                                                  and 4 mg           Branch



                                                  at noon           



                                                  and 4 mg           



                                                  in the           



                                                  evening.           

 

     insulin NPH      2023-0      Yes            45U       inject 45           U

nivers



     human      2-02                               Units           ity of



     isophane      19:25:                               under the           [place]

[facility]



     (NOVOLIN N      48                                 skin 2           Medical



     SC)                                          (two)           Branch



                                                  times           



                                                  daily.           

 

     HYDROmorpho      2023-0      Yes            4mg       Take 4 mg           U

nivers



     ne 4 mg      2-02                               by mouth           ity of



     tablet      19:25:                               in the           [place]



               48                                 morning           Medical



                                                  and 4 mg           [facility]



                                                  at noon           



                                                  and 4 mg           



                                                  in the           



                                                  evening.           

 

     insulin NPH      2023-0      Yes            45U       inject 45           U

nivers



     human      2-02                               Units           ity of



     isophane      19:25:                               under the           [place]

s



     (NOVOLIN N      48                                 skin 2           Medical



     SC)                                          (two)           [facility]



                                                  times           



                                                  daily.           

 

     magnesium      2023-0      Yes            400mg      400 mg,           Univ

ers



     oxide                                     Oral, BID,           ity of



     (MAG-OX      14:00:                               First dose           [place]

[facility]



     400) tablet      00                                 on u           [facility]ica

l



     400 mg                                         23 at           [facility]



                                                  0800,           



                                                  Until           



                                                  Discontinu           



                                                  ed,            



                                                  Routine           

 

     Sliding            Yes                      Subcutaneo           Univ

ers



     Scale      2-02                               us, TID           ity of



     Insulin -      14:00:                               MEALS,           [place]



     Lispro      00                                 First dose           Medical



     (HumaLOG) +                                         on u           [facility]



     Fsbg                                         23 at           



     Testing                                         0800,           



                                                  Until           



                                                  Discontinu           



                                                  ed,            



                                                  Routine           

 

     magnesium      0 - No             2g        2 g, IV           Univ

ers



     sulfate in                                Piggyback,           it

y of



     water 2      14:00: 15:41                          Administer           [facility]

as



     gram/50 mL      00   :00                           over 60           Medica

l



     (4 %)                                         Minutes,           [facility]



     infusion 2                                         ONCE, 1           



     g                                            dose, On           



                                                  u 23           



                                                  at 0800,           



                                                  Routine           

 

     HYDROcodone      0      Yes            1{tbl}      1 tablet,          

 [facility]



     -acetaminop                                     Oral,           ity of



     hen (NORCO)      13:47:                               Q8HPRN,           [facility]

as



      mg      52                                 Starting           Medica

l



     tablet 1                                         on [facility] [place]



     tablet                                         23 at           



                                                  0747,           



                                                  Until           



                                                  Discontinu           



                                                  ed,            



                                                  Routine,           



                                                  Pain           



                                                  (scale           



                                                  4-6)           

 

     glipiZIDE            Yes            5mg       5 mg,           [facility]



     (GLUCOTROL)                                     Oral,           ity of



     tablet 5 mg      13:30:                               BIDAC,           [place]

[facility]



               00                                 First dose           Medical



                                                  on u           [facility]



                                                  23 at           



                                                  0730,           



                                                  Until           



                                                  Discontinu           



                                                  ed,            



                                                  Routine           

 

     NaCl 0.9%            Yes            1000mL      at 150           Univ

ers



     (NS) IV      2-02                               mL/hr, IV           ity of



     infusion      09:15:                               Infusion,           [place]

[facility]



     1,000 mL      00                                 CONTINUOUS           Medic

al



                                                  , Starting           [facility]



                                                  on u           



                                                  23 at           



                                                  0315,           



                                                  Until           



                                                  Discontinu           



                                                  ed,            



                                                  Routine           

 

     NaCl 0.9%      0 - No             1000mL      at 999           Uni

vers



     (NS) bolus      02                          mL/hr,           ity of



     infusion      09:00: 10:08                          1,000 mL,           [facility]

as



     1,000 mL      00   :51                           IV             Medical



                                                  Infusion,           [facility]



                                                  ONCE, 1           



                                                  dose, On           



                                                  u 23           



                                                  at 0300,           



                                                  STAT           

 

     sennosides            Yes            8.6mg      8.6 mg,           Uni

vers



     (SENOKOT)                                     Oral, BID,           ity 

of



     tablet 8.6      08:30:                               First dose           [facility]

[facility]



     mg        00                                 on Thu           Medical



                                                  23 at           [facility]



                                                  0230,           



                                                  Until           



                                                  Discontinu           



                                                  ed,            



                                                  Routine           

 

     docusate            Yes            100mg      100 mg,           Unive

rs



     (COLACE)      02                               Oral, BID,           ity o

f



     capsule 100      08:30:                               First dose           

[place]



     mg        00                                 on Thu           Medical



                                                  23 at           [facility]



                                                  0230,           



                                                  Until           



                                                  Discontinu           



                                                  ed,            



                                                  Routine           

 

     insulin NPH            Yes            20U       20 Units,           U

nivers



     and regular                                     Subcutaneo           it

y of



     human 70-30      08:30:                               us, BIDAC,           

[place]



     (7030      00                                 First dose           Medical



     U-100                                         (after           [facility]



     INSULIN)                                         last           



     100 unit/mL                                         modificati           



     (70-30)                                         on) on Thu           



     injection                                         23 at           



     20 Units                                         0230,           



                                                  Until           



                                                  Discontinu           



                                                  ed,            



                                                  Routine           

 

     proCHLORper            Yes            10mg      10 mg,           Univ

ers



     azine                                     Slow IV           ity of



     (COMPAZINE)      08:19:                               Push,           [place]



     injection      04                                 Q6HPRN,           Medical



     10 mg                                         Starting           [facility]



                                                  on 23 at           



                                                  0219,           



                                                  Until           



                                                  Discontinu           



                                                  ed,            



                                                  Routine,           



                                                  Nausea and           



                                                  Vomiting           



                                                  (N/V)           

 

     FENTanyl PF            Yes            50ug      50 mcg,           Uni

vers



     (SUBLIMAZE                                     Slow IV           ity of



     (PF))      05:33:                               Push,           [place]



     injection      35                                 Q4HPRN,           Medical



     50 mcg                                         Starting           [facility]



                                                  on 23 at           



                                                  2333,           



                                                  Until           



                                                  Discontinu           



                                                  ed,            



                                                  Routine,           



                                                  Pain           



                                                  (scale           



                                                  7-10)           

 

     sodium      2023- No             30g       30 g,           [facility]



     polystyrene                                Oral,           ity of



     sulfonate      03:15: 05:11                          ONCE, 1           [place]

s



     (KAYEXALATE      00   :00                           dose, On           Medi

sarah



     ) 15                                         Wed 2/1/23           [facility]



     gram/60 mL                                         at 2115,           



     suspension                                         Routine           



     30 g                                                        

 

     insulin      2023- No             5U        5 Units,           Unive

rs



     regular                                Slow IV           ity of



     human      02:15: 01:10                          Push,           [place]



     (HUMULIN R)      00   :00                           ONCE, 1           Medic

al



     injection 5                                         dose, On           [facility]

ch



     Units                                         Wed 23           



                                                  at 2015,           



                                                  STAT<br>In           



                                                  dication           



                                                  for            



                                                  insulin:           



                                                  Hyperglyce           



                                                  jarvis            

 

     cefTRIAXone      2023- No             1000mg      1,000 mg,         

  [facility]



     (ROCEPHIN)                                IV             ity of



     1,000 mg in      01:45: 02:27                          Piggyback,          

 [place]



     NaCl 0.9%      00   :00                           ONCE, 1           Medical



     (NS) 50 mL                                         dose, On           [facility]

h



     MINI-BAG                                         Wed 23           



                                                  at 1945,           



                                                  Administer           



                                                  over 30           



                                                  Minutes,           



                                                  50             



                                                  mL<br&gt;R           



                                                  elmira for           



                                                  Anti-Infec           



                                                  tive:           



                                                  Documented           



                                                  Infection<           



                                                  br>Documen           



                                                  huma            



                                                  Infection           



                                                  Site:           



                                                  Urine<br&g           



                                                  t;Duration           



                                                  of             



                                                  Therapy:           



                                                  Other (see           



                                                  Comments)           

 

     acetaminoph      2023- No             1000mg      1,000 mg,         

  [facility]



     en                                  Oral,           ity of



     (TYLENOL)      01:30: 01:28                          ONCE, 1           [place]

[facility]



     tablet      00   :00                           dose, On           Medical



     1,000 mg                                         Wed 23           [facility]

h



                                                  at 1930,           



                                                  ASAP           

 

     iopamidol      2023- No        [id number] 80mL      80 mL,           U

nivers



     (ISOVUE                                Intravenou           ity o

f



     370-500 mL)      00:45: 00:45                          s, ONCE, 1          

 [place]



     injection      00   :00                           dose, On           Medica

l



     80 mL                                         Wed 23           [facility]



                                                  at 1845,           



                                                  Routine           

 

     FENTanyl PF      2023- No             75ug      75 mcg,           Un

yoselyn



     (SUBLIMAZE                                Slow IV           ity o

f



     (PF))      22:45: 22:19                          Push,           [place]



     injection      00   :00                           ONCE, 1           Medical



     75 mcg                                         dose, On           [facility]



                                                  Wed 23           



                                                  at 1645,           



                                                  STAT           

 

     insulin      2023- No             10U       10 Units,           Univ

ers



     regular                                Slow IV           ity of



     human      22:30: 21:28                          Push,           [place]



     (HUMULIN R)      00   :00                           ONCE, 1           Medic

al



     injection                                         dose, On           [facility]



     10 Units                                         Wed 23           



                                                  at 1630,           



                                                  STAT<br>In           



                                                  dication           



                                                  for            



                                                  insulin:           



                                                  Hyperglyce           



                                                  jarvis            

 

     NaCl 0.9%      2023- No             1000mL      at 999           Uni

vers



     (NS) bolus      2-01 02-01                          mL/hr,           ity of



     infusion      22:30: 23:40                          1,000 mL,           [facility]

as



     1,000 mL      00   :00                           IV             Medical



                                                  Infusion,           [facility]



                                                  ONCE, 1           



                                                  dose, On           



                                                  23           



                                                  at 1630,           



                                                  STAT           

 

     lisinopriL      3-0      Yes       [id number] 2.5mg      Take 1           

[facility]



     2.5 mg      1-01                               tablet by           ity of



     tablet      00:00:                               mouth in           [place]



               00                                 the            Medical



                                                  morning.           [facility]

 

     lisinopriL      3-0      Yes       [id number] 2.5mg      Take 1           

[facility]



     2.5 mg      1-01                               tablet by           ity of



     tablet      00:00:                               mouth in           [place]



                                                the            Medical



                                                  morning.           [facility]

 

     lisinopriL      3-0      Yes       [id number] 2.5mg      Take 1           

[facility]



     2.5 mg      1-01                               tablet by           ity of



     tablet      00:00:                               mouth in           [place]



                                                the            Medical



                                                  morning.           [facility]

 

     lisinopriL      3-0      Yes       [id number] 2.5mg      Take 1           

[facility]



     2.5 mg      1-01                               tablet by           ity of



     tablet      00:00:                               mouth in           [place]



                                                the            Medical



                                                  morning.           [facility]

 

     lisinopriL      3-0      Yes       [id number] 2.5mg      Take 1           

[facility]



     2.5 mg      1-01                               tablet by           ity of



     tablet      00:00:                               mouth in           [place]



                                                the            Medical



                                                  morning.           [facility]

 

     lisinopriL      3-0      Yes       [id number] 2.5mg      Take 1           

[facility]



     2.5 mg      1-01                               tablet by           ity of



     tablet      00:00:                               mouth in           [place]



                                                the            Medical



                                                  morning.           [facility]anch

 

     lisinopriL      2023-0      Yes       [id number] 2.5mg      Take 1           

[facility]



     2.5 mg      1-01                               tablet by           ity of



     tablet      00:00:                               mouth in           [place]



                                                the            Medical



                                                  morning.           [facility]

 

     lisinopriL      2023-0      Yes       [id number] 2.5mg      Take 1           

[facility]



     2.5 mg      1-01                               tablet by           ity of



     tablet      00:00:                               mouth in           [place]



                                                the            Medical



                                                  morning.           [facility]

 

     lisinopriL      2023-0      Yes       [id number] 2.5mg      Take 1           

[facility]



     2.5 mg      1-01                               tablet by           ity of



     tablet      00:00:                               mouth in           [place]



                                                the            Medical



                                                  morning.           [facility]

 

     lisinopriL      2023-0      Yes       [id number] 2.5mg      Take 1           

[facility]



     2.5 mg      1-01                               tablet by           ity of



     tablet      00:00:                               mouth in           [place]



               00                                 the            Medical



                                                  morning.           [facility]

 

     lisinopriL      2023-0      Yes       [id number] 2.5mg      Take 1           

[facility]



     2.5 mg      1-01                               tablet by           ity of



     tablet      00:00:                               mouth in           [place]



                                                the            Medical



                                                  morning.           [facility]

 

     lisinopriL      2023-0      Yes       [id number] 2.5mg      Take 1           

[facility]



     2.5 mg      1-01                               tablet by           ity of



     tablet      00:00:                               mouth in           [place]



                                                the            Medical



                                                  morning.           [facility]

 

     lisinopriL      2023-0      Yes       [id number] 2.5mg      Take 1           

[facility]



     2.5 mg      1-01                               tablet by           ity of



     tablet      00:00:                               mouth in           [place]



                                                the            Medical



                                                  morning.           [facility]anch

 

     lisinopriL      2023-0      Yes       [id number] 2.5mg      Take 1           

[facility]



     2.5 mg      1-01                               tablet by           [facility]



     tablet      00:00:                               mouth in           [place]



                                                the            [facility]



                                                  morning.           [facility]

 

     lisinopriL      2023-0      Yes       [id number] 2.5mg      Take 1           

[facility]



     2.5 mg      1-01                               tablet by           [facility]



     tablet      00:00:                               mouth in           [place]



                                                the            [facility]



                                                  morning.           [facility]

 

     lisinopriL      2023-0      Yes       [id number] 2.5mg      Take 1           

[facility]



     2.5 mg      1-01                               tablet by           [facility]



     tablet      00:00:                               mouth in           [place]



                                                the            [facility]



                                                  morning.           [facility]

 

     lisinopriL      2023-0      Yes       [id number] 2.5mg      Take 1           

[facility]



     2.5 mg      1-01                               tablet by           [facility]



     tablet      00:00:                               mouth in           [place]



                                                the            [facility]



                                                  morning.           [facility]

 

     lisinopriL      2023-0      Yes       [id number] 2.5mg      Take 1           

[facility]



     2.5 mg      1-01                               tablet by           [facility]



     tablet      00:00:                               mouth in           [place]



                                                the            [facility]



                                                  morning.           [facility]

 

     lisinopriL      2023-0      Yes       [id number] 2.5mg      Take 1           

[facility]



     2.5 mg      1-01                               tablet by           [facility]



     tablet      00:00:                               mouth in           [place]



                                                the            [facility]



                                                  morning.           [facility]

 

     lisinopriL      2023-0      Yes       [id number] 2.5mg      Take 1           

[facility]



     2.5 mg      1-01                               tablet by           [facility]



     tablet      00:00:                               mouth in           [place]



               00                                 the            Medical



                                                  morning.           [facility]

 

     lisinopriL      2023-0      Yes       [id number] 2.5mg      Take 1           

[facility]



     2.5 mg      1-01                               tablet by           [facility]



     tablet      00:00:                               mouth in           [place]



                                                the            [facility]



                                                  morning.           [facility]

 

     lisinopriL      2023-0      Yes       [id number] 2.5mg      Take 1           

[facility]



     2.5 mg      1-01                               tablet by           [facility]



     tablet      00:00:                               mouth in           [place]



                                                the            [facility]



                                                  morning.           [facility]

 

     lisinopriL      2023-0      Yes       [id number] 2.5mg      Take 1           

[facility]



     2.5 mg      1-01                               tablet by           [facility]



     tablet      00:00:                               mouth in           [place]



                                                the            [facility]



                                                  morning.           [facility]

 

     lisinopriL      2023-0      Yes       [id number] 2.5mg      Take 1           

[facility]



     2.5 mg      1-01                               tablet by           [facility]



     tablet      00:00:                               mouth in           [place]



                                                the            [facility]



                                                  morning.           [facility]

 

     lisinopriL      2023-0      Yes       [id number] 2.5mg      Take 1           

[facility]



     2.5 mg      1-01                               tablet by           [facility]



     tablet      00:00:                               mouth in           [place]



                                                the            Medical



                                                  morning.           [facility]

 

     lisinopriL      2023-0      Yes       [id number] 2.5mg      Take 1           

[facility]



     2.5 mg      1-01                               tablet by           [facility]



     tablet      00:00:                               mouth in           [place]



                                                the            Medical



                                                  morning.           [facility]

 

     lisinopriL      2023-0      Yes       [id number] 2.5mg      Take 1           

[facility]



     2.5 mg      1-01                               tablet by           [facility]



     tablet      00:00:                               mouth in           [place]



                                                the            [facility]



                                                  morning.           [facility]

 

     lisinopriL      2023-0      Yes       [id number] 2.5mg      Take 1           

[facility]



     2.5 mg      1-01                               tablet by           [facility]



     tablet      00:00:                               mouth in           [place]



                                                the            [facility]



                                                  morning.           [facility]

 

     lisinopriL      2023-0      Yes       [id number] 2.5mg      Take 1           

[facility]



     2.5 mg      1-01                               tablet by           [facility]



     tablet      00:00:                               mouth in           [place]



                                                the            [facility]



                                                  morning.           [facility]

 

     lisinopriL      2023-0      Yes       [id number] 2.5mg      Take 1           

[facility]



     2.5 mg      1-01                               tablet by           [facility]



     tablet      00:00:                               mouth in           [place]



                                                the            [facility]



                                                  morning.           [facility]

 

     lisinopriL      2023-0      Yes       [id number] 2.5mg      Take 1           

[facility]



     2.5 mg      1-01                               tablet by           [facility]



     tablet      00:00:                               mouth in           [place]



                                                the            [facility]



                                                  morning.           [facility]

 

     lisinopriL      2023-0      Yes       [id number] 2.5mg      Take 1           

[facility]



     2.5 mg      1-01                               tablet by           [facility]



     tablet      00:00:                               mouth in           [place]



                                                the            [facility]



                                                  morning.           [facility]

 

     lisinopriL      2023-0      Yes       [id number] 2.5mg      Take 1           

[facility]



     2.5 mg      1-01                               tablet by           [facility]



     tablet      00:00:                               mouth in           [place]



                                                the            [facility]



                                                  morning.           [facility]

 

     lisinopriL      2023-0      Yes       [id number] 2.5mg      Take 1           

[facility]



     2.5 mg      1-01                               tablet by           [facility]



     tablet      00:00:                               mouth in           [place]



                                                the            [facility]



                                                  morning.           [facility]

 

     lisinopriL      2023-0      Yes       [id number] 2.5mg      Take 1           

[facility]



     2.5 mg      1-01                               tablet by           [facility]



     tablet      00:00:                               mouth in           [place]



                                                the            [facility]



                                                  morning.           [facility]

 

     lisinopriL      2023-0      Yes       [id number] 2.5mg      Take 1           

[facility]



     2.5 mg      1-01                               tablet by           [facility]



     tablet      00:00:                               mouth in           [place]



                                                the            [facility]



                                                  morning.           [facility]

 

     lisinopriL      2023-0      Yes       [id number] 2.5mg      Take 1           

[facility]



     2.5 mg      1-01                               tablet by           [facility]



     tablet      00:00:                               mouth in           [place]



                                                the            [facility]



                                                  morning.           [facility]

 

     lisinopriL      2023-0      Yes       [id number] 2.5mg      Take 1           

[facility]



     2.5 mg      1-01                               tablet by           [facility]



     tablet      00:00:                               mouth in           [place]



                                                the            [facility]



                                                  morning.           [facility]

 

     lisinopriL      2023-0      Yes       [id number] 2.5mg      Take 1           

[facility]



     2.5 mg      1-01                               tablet by           [facility]



     tablet      00:00:                               mouth in           [place]



                                                the            Medical



                                                  morning.           [facility]

 

     lisinopriL      2023-0      Yes       [id number] 2.5mg      Take 1           

[facility]



     2.5 mg      1-01                               tablet by           [facility]



     tablet      00:00:                               mouth in           [place]



                                                the            Medical



                                                  morning.           [facility]

 

     lisinopriL      2023-0      Yes       [id number] 2.5mg      Take 1           

[facility]



     2.5 mg      1-01                               tablet by           [facility]



     tablet      00:00:                               mouth in           [place]



                                                the            [facility]



                                                  morning.           [facility]

 

     lisinopriL      2023-0      Yes       [id number] 2.5mg      Take 1           

[facility]



     2.5 mg      1-01                               tablet by           [facility]



     tablet      00:00:                               mouth in           [place]



                                                the            [facility]



                                                  morning.           [facility]

 

     lisinopriL      2023-0      Yes       [id number] 2.5mg      Take 1           

[facility]



     2.5 mg      1-01                               tablet by           [facility]



     tablet      00:00:                               mouth in           [place]



               00                                 the            Medical



                                                  morning.           [place]

 

     lisinopriL            Yes       [id number] 2.5mg      Take 1           

[facility]



     2.5 mg      1-01                               tablet by           [facility]



     tablet      00:00:                               mouth in           [place]



               00                                 the            Medical



                                                  morning.           [place]

 

     lisinopriL            Yes       [id number] 2.5mg      Take 1           

[facility]



     2.5 mg      1-01                               tablet by           [facility]



     tablet      00:00:                               mouth in           [place]



               00                                 the            Medical



                                                  morning.           [place]

 

     Nitrofurant      2022 No             100mg      100 mg,           [facility]

[facility]



     oin&Nit.      13                          Oral, BID,           [facility] 

of



     Macrocryst      02:00: 01:59                          10 doses,           [facility]

[place]



     (MACROBID)      00   :00                           First dose           Med

ical



     100 mg                                         on 



     capsule 100                                         22 at           



     mg                                           2000, Last           



                                                  dose on           



                                                  Sat            



                                                  22           



                                                  at 0800,           



                                                  Routine<br           



                                                  >Reason           



                                                  for            



                                                  Anti-Infec           



                                                  tive:           



                                                  Empiric           



                                                  Therapy           



                                                  for            



                                                  Suspected           



                                                  Infection<           



                                                  br>Empiric           



                                                  Therapy           



                                                  Site:           



                                                  Urine<br>D           



                                                  uration of           



                                                  therapy:           



                                                  72 hours           

 

     ceFEPIme      2022 No             1000mg      1,000 mg,           [facility]

[facility]



     (MAXIPIME)                                IV             [facility]



     1,000 mg in      21:15: 21:34                          [place], [place]



     NaCl 0.9%      00   :48                           ONCE, 1           Medical



     (NS) 50 mL                                         dose, On           [facility]

h



     MINI-BAG                                         22 at           



                                                  1515,           



                                                  Administer           



                                                  over 30           



                                                  Minutes,           



                                                  50             



                                                  mL<br>Reas           



                                                  on for           



                                                  Anti-Infec           



                                                  tive:           



                                                  Documented           



                                                  Infection<           



                                                  br>Documen           



                                                  huma            



                                                  Infection           



                                                  Site:           



                                                  Urine<br&g           



                                                  t;Duration           



                                                  of             



                                                  Therapy: 7           



                                                  days           

 

     HYDROmorpho      2022      Yes            4mg       Take 4 mg           [facility]

[facility]



     ne 4 mg      1-07                               by mouth 3           [facility]



     tablet      17:55:                               (three)           [place]



               40                                 times           Medical



                                                  daily as           [facility]



                                                  needed.           

 

     HYDROmorpho      2022      Yes            4mg       Take 4 mg           [facility]

n[facility]



     ne 4 mg      1-07                               by mouth 3           [facility] of



     tablet      17:55:                               (three)           [place]



               40                                 times           Medical



                                                  daily as           [facility]



                                                  needed.           

 

     HYDROmorpho      2022      Yes            4mg       Take 4 mg           U

n[facility]



     ne 4 mg      1-07                               by mouth 3           [facility] of



     tablet      17:55:                               (three)           [place]



               40                                 times           Medical



                                                  daily as           [facility]



                                                  needed.           

 

     HYDROmorpho      -      Yes            4mg       Take 4 mg           [facility]

[facility]



     ne 4 mg      1-07                               by mouth 3           [facility]



     tablet      17:55:                               (three)           [place]



               40                                 times           Medical



                                                  daily as           [facility]



                                                  needed.           

 

     HYDROmorpho      -1      Yes            4mg       Take 4 mg           [facility]

n[facility]



     ne 4 mg      1-07                               by mouth 3           [facility] of



     tablet      17:55:                               (three)           [place]



               40                                 times           Medical



                                                  daily as           [facility]



                                                  needed.           

 

     HYDROmorpho      -1      Yes            4mg       Take 4 mg           U

n[facility]



     ne 4 mg      1-07                               by mouth 3           [facility] of



     tablet      17:55:                               (three)           [place]



               40                                 times           Medical



                                                  daily as           [facility]



                                                  needed.           

 

     HYDROmorpho      -      Yes            4mg       Take 4 mg           U

n[facility]



     ne 4 mg      1-07                               by mouth 3           [facility] of



     tablet      17:55:                               (three)           [place]



               40                                 times           Medical



                                                  daily as           [facility]



                                                  needed.           

 

     HYDROmorpho      -      Yes            4mg       Take 4 mg           U

n[facility]



     ne 4 mg      1-07                               by mouth 3           ity of



     tablet      17:55:                               (three)           [place]



               40                                 times           Medical



                                                  daily as           [facility]



                                                  needed.           

 

     HYDROmorpho      -      Yes            4mg       Take 4 mg           U

n[facility]



     ne 4 mg      1-07                               by mouth 3           ity of



     tablet      17:55:                               (three)           [place]



               40                                 times           Medical



                                                  daily as           [facility]



                                                  needed.           

 

     HYDROmorpho      -      Yes            4mg       Take 4 mg           U

n[facility]



     ne 4 mg      1-07                               by mouth 3           ity of



     tablet      17:55:                               (three)           [place]



               40                                 times           Medical



                                                  daily as           [facility]



                                                  needed.           

 

     HYDROmorpho      2022      Yes            4mg       Take 4 mg           U

n[facility]



     ne 4 mg      1-07                               by mouth 3           ity of



     tablet      17:55:                               (three)           [place]



               40                                 times           Medical



                                                  daily as           [facility]



                                                  needed.           

 

     HYDROmorpho      -      Yes            4mg       Take 4 mg           U

n[facility]



     ne 4 mg      1-07                               by mouth 3           ity of



     tablet      17:55:                               (three)           [place]



               40                                 times           Medical



                                                  daily as           [facility]



                                                  needed.           

 

     enoxaparin      2022      Yes            40mg      40 mg,           [facility]

[facility]



     (LOVENOX)                                     Subcutaneo           ity 

of



     injection      15:00:                               [place], DAILY,           [facility]

[place]



     40 mg      00                                 First dose           [facility]



                                                  on Mon           [facility]



                                                  22 at           



                                                  0900,           



                                                  Until           



                                                  Discontinu           



                                                  ed,            



                                                  Routine           

 

     Sliding      2022      Yes                      Subcutaneo           Un[facility]

ers



     Scale      1-07                               us, TID           ity of



     Insulin -      03:00:                               MEALS+HS,           [facility]

as



     Lispro      00                                 First dose           Medical



     (HumaLOG) +                                         on [facility]



     Fsbg                                         22 at           



     Testing                                         2100,           



                                                  Until           



                                                  Discontinu           



                                                  ed,            



                                                  Routine           

 

     FENTanyl PF      2022- No             75ug      75 mcg,           Un

[facility]



     (SUBLIMAZE                                Slow IV           ity o

f



     (PF))      01:15: 00:23                          Push,           [place]



     injection      00   :00                           ONCE, 1           Medical



     75 mcg                                         dose, On           [facility]            



                                                  22 at           



                                                  1915,           



                                                  Routine           

 

     dextrose      2022      Yes            250mL      250 mL, IV           Un

[facility]



     10% (D10W)                                     Infusion,           ity 

of



     bolus      01:12:                               PRN - SEE           [place]



     infusion      23                                 INSTRUCTIO           Medic

al



     250 mL                                         NS,            [facility]



                                                  Administer           



                                                  over 60           



                                                  Minutes,           



                                                  Other, If           



                                                  blood           



                                                  glucose is           



                                                  &amp;lt;           



                                                  or = 70           



                                                  mg/dL and           



                                                  patient is           



                                                  unable to           



                                                  swallow or           



                                                  has mental           



                                                  status           



                                                  changes,           



                                                  Starting           



                                                  on Sun           



                                                  22 at           



                                                  1912<br>If           



                                                  blood           



                                                  glucose is           



                                                  < or = 70           



                                                  mg/dL and           



                                                  patient is           



                                                  unable to           



                                                  swallow or           



                                                  has mental           



                                                  status           



                                                  changes           



                                                  (Give           



                                                  glucagon           



                                                  order if           



                                                  patient           



                                                  needs           



                                                  fluid           



                                                  restrictio           



                                                  n):            



                                                  &nbsp;IF           



                                                  IV access           



                                                  available:           



                                                  Dextrose           



                                                  10%.           



                                                  &nbsp;1.           



                                                  125 mL (?           



                                                  bag) of           



                                                  D10W IV           



                                                  infusion -           



                                                  equivalent           



                                                  to 12.5 g           



                                                  dextrose           



                                                  &nbsp;2.           



                                                  Blood           



                                                  glucose -           



                                                  draw blood           



                                                  glucose 15           



                                                  minutes           



                                                  after D10W           



                                                  Administra           



                                                  tion.           



                                                  &amp;nbsp;           



                                                  3. If           



                                                  blood           



                                                  glucose is           



                                                  < 80           



                                                  mg/dL,           



                                                  repeat.<br           



                                                  >              

 

     glucagon      2022      Yes            1mg       1 mg,           [facility]



     (GLUCAGEN                                     Intramuscu           ity 

of



     DIAGNOSTIC      01:12:                               lar, PRN,           [place]

[place]



     KIT)      20                                 Starting           Medical



     injection 1                                         on [facility]



     mg                                           22 at           



                                                  1912,           



                                                  Until           



                                                  Discontinu           



                                                  ed, ASAP,           



                                                  Blood           



                                                  Glucose           



                                                  &amp;lt;           



                                                  or = 70           



                                                  mg/dL and           



                                                  patient is           



                                                  unable to           



                                                  swallow or           



                                                  has mental           



                                                  changes.           

 

     HYDROmorpho      2022      Yes            4mg       4 mg,           Unive

rs



     ne                                       Oral,           ity of



     (DILAUDID)      01:08:                               Q6HPRN,           [place]

s



     tablet 4 mg      42                                 Starting           Medi

sar[place]



                                                  on [facility]



                                                  22 at           



                                                  1908,           



                                                  Until           



                                                  Discontinu           



                                                  ed,            



                                                  Routine,           



                                                  Pain           



                                                  (scale           



                                                  7-10)           

 

     proMETHazin      2022      Yes            12.5mg      12.5 mg,           

[facility]



     e                                        Oral,           ity of



     (PHENERGAN)      01:06:                               Q4HPRN,           Eric

as



     tablet 12.5      57                                 Starting           Medi

[place]



     mg                                           on Sun           Branch



                                                  22 at           



                                                  1906,           



                                                  Until           



                                                  Discontinu           



                                                  ed,            



                                                  Routine,           



                                                  Nausea and           



                                                  Vomiting           



                                                  (N/V)           

 

     acetaminoph      2022      Yes            650mg      650 mg,           Un

[facility]



     en                                       Oral,           ity of



     (TYLENOL)      01:05:                               Q6HPRN,           [place]



     tablet 650      57                                 Starting           Medic

al



     mg                                           on Sun           Branch



                                                  22 at           



                                                  1905,           



                                                  Until           



                                                  Discontinu           



                                                  ed,            



                                                  Routine,           



                                                  Pain           



                                                  (scale           



                                                  1-3)           

 

     NaCl 0.9%      2022- No             1000mL      at 999           Uni

vers



     (NS) bolus      07                          mL/hr,           ity of



     infusion      00:30: 00:35                          1,000 mL,           Eric

as



     1,000 mL      00   :00                           IV             Medical



                                                  Infusion,           Branch



                                                  ONCE, 1           



                                                  dose, On           



                                                  Sun            



                                                  22 at           



                                                  1830, STAT           

 

     Nitrofurant      2022- No        [id number] 100mg      Take 1         

  [facility]



     oin&Nit.       1115                          capsule by           ity 

of



     Macrocryst      00:00: 05:59                          mouth in           [facility]

[place]



     100 mg      00   :00                           the            Medical



     capsule                                         morning           



                                                  and 1           



                                                  capsule in           



                                                  the            



                                                  evening.           



                                                  Do all           



                                                  this for 7           



                                                  days.           

 

     NaCl 0.9%      2022 No             1000mL      at 999           Uni

vers



     (NS) bolus                                mL/hr,           ity of



     infusion      23:45: 00:36                          1,000 mL,           Eric

as



     1,000 mL      00   :00                           IV             Medical



                                                  Infusion,           [facility]



                                                  ONCE, 1           



                                                  dose, On           



                                                  Sun            



                                                  22 at           



                                                  1745, ASAP           

 

     FENTanyl PF      2022- No             75ug      75 mcg,           Un

[facility]



     (SUBLIMAZE                                Slow IV           ity o

f



     (PF))      23:45: 23:19                          Push,           [place]



     injection      00   :00                           ONCE, 1           Medical



     75 mcg                                         dose, On           [facility]



                                                  Sun            



                                                  22 at           



                                                  1745,           



                                                  Routine           

 

     iopamidol      2022- No        [id number] 85mL      85 mL,           

[facility]



     (ISOVUE      0-30 10-30                          Intravenou           ity o

f



     370-500 mL)      03:00: 02:09                          s, ONCE, 1          

 [place]



     injection      00   :00                           dose, On           Medica

l



     85 mL                                         Sat            Branch



                                                  10/29/22           



                                                  at 2200,           



                                                  Routine           

 

     FENTanyl PF      2022 No             50ug      50 mcg,           Un

[facility]



     (SUBLIMAZE      0-30 10-30                          Slow IV           ity o

f



     (PF))      02:45: 01:47                          Push,           [place]



     injection      00   :00                           ONCE, 1           Medical



     50 mcg                                         dose, On           Branch



                                                  Sat            



                                                  10/29/22           



                                                  at 2145,           



                                                  Routine           

 

     cefdinir      2022 No             300mg      300 mg,           Univ

ers



     (OMNICEF)      0-30 10-30                          Oral,           ity of



     capsule 300      02:15: 03:14                          ONCE, 1           [facility]

[place]



     mg        00   :00                           dose, On           Medical



                                                  Sat            Branch



                                                  10/29/22           



                                                  at 2115,           



                                                  ASAP<br>Re           



                                                  ason for           



                                                  Anti-Infec           



                                                  tive:           



                                                  Documented           



                                                  Infection<           



                                                  br>Documen           



                                                  huma            



                                                  Infection           



                                                  Site:           



                                                  Urine<br>D           



                                                  uration of           



                                                  Therapy:           



                                                  Other (see           



                                                  Comments)           

 

     proMETHazin      2022 No             25mg      25 mg, IV           

[facility]



     e         0-30 10-30                          Piggyback,           ity of



     (PHENERGAN)      01:45: 01:47                          ONCE, 1           [facility]

[place]



     25 mg in      00   :00                           dose, On           Medical



     NaCl 0.9%                                         Sat            Branch



     (NS) 50 mL                                         10/29/22           



     IV                                           at 2045,           



     piggyback                                         ASAP           

 

     cefdinir      2022      Yes       [id number] 300mg      Take 1           Un

[facility]



     300 mg      0-29                               capsule by           ity of



     capsule      00:00:                               mouth           [place]



               00                                 every 12           Medical



                                                  (twelve)           Branch



                                                  hours.           

 

     cefdinir      2022      Yes       [id number] 300mg      Take 1           Un

[facility]



     300 mg      0-29                               capsule by           ity of



     capsule      00:00:                               mouth           [place]



               00                                 every 12           Medical



                                                  (twelve)           Branch



                                                  hours.           

 

     cefdinir      2022- No        [id number] 300mg      Take 1           U

nivers



     300 mg      0-29 11-07                          capsule by           ity of



     capsule      00:00: 00:00                          mouth           [place]



               00   :00                           every 12           Medical



                                                  (twelve)           Branch



                                                  hours.           

 

     HYDROmorpho      2022      Yes            4mg       Take 4 mg           U

nivers



     ne 4 mg      0-23                               by mouth 3           ity of



     tablet      12:52:                               (three)           [place]



               28                                 times           Medical



                                                  daily as           Branch



                                                  needed.           

 

     HYDROmorpho      2022      Yes            4mg       Take 4 mg           U

nivers



     ne 4 mg      0-23                               by mouth 3           ity of



     tablet      12:52:                               (three)           [place]



               28                                 times           Medical



                                                  daily as           Branch



                                                  needed.           

 

     HYDROmorpho      2022      Yes            4mg       Take 4 mg           U

nivers



     ne 4 mg      0-23                               by mouth 3           ity of



     tablet      12:52:                               (three)           [place]



               28                                 times           Medical



                                                  daily as           Branch



                                                  needed.           

 

     HYDROmorpho      2022      Yes            4mg       Take 4 mg           U

nivers



     ne 4 mg      0-23                               by mouth 3           ity of



     tablet      12:52:                               (three)           [place]



               28                                 times           Medical



                                                  daily as           Branch



                                                  needed.           

 

     HYDROmorpho      2022- No             .5mg      0.5 mg,           Un

[facility]



     ne        0-23 10-23                          Slow IV           ity of



     (DILAUDID)      03:15: 03:23                          Push,           [place]



     injection      00   :00                           ONCE, 1           Medical



     0.5 mg                                         dose, On           Br[facility]



                                                  Sat            



                                                  10/22/22           



                                                  at 2215,           



                                                  Routine<br           



                                                  >Use           



                                                  approved           



                                                  by             



                                                  (Faculty):           



                                                  [facility]            



                                                  PROVIDER           

 

     HYDROmorpho      2022- No             .5mg      0.5 mg,           Un

[facility]



     ne        0-22 10-22                          Slow IV           ity of



     (DILAUDID)      02:00: 02:00                          Push,           [place]



     injection      00   :00                           ONCE, 1           Medical



     0.5 mg                                         dose, On           [facility]



                                                  Fri            



                                                  10/21/22           



                                                  at 2100,           



                                                  Routine<br           



                                                  >Use           



                                                  approved           



                                                  by             



                                                  (Faculty):           



                                                  [facility]            



                                                  PROVIDER           

 

     doxycycline      2022- No             100mg      100 mg, IV         

  [facility]



     (VIBRAMYCIN      0-21 10-22                          Piggyback,           i

ty of



     ) 100 mg in      23:30: 20:05                          Q12H ABX,           

[place]



     NaCl 0.9%      00   :47                           10 doses,           Medic

al



     (NS) 100 mL                                         First dose           Br

anch



     MINI-BAG                                         on Fri           



                                                  10/21/22           



                                                  at 1830,           



                                                  Last dose           



                                                  on Wed           



                                                  10/26/22           



                                                  at 0630,           



                                                  Administer           



                                                  over 60           



                                                  Minutes,           



                                                  100            



                                                  mL<br>Reas           



                                                  on for           



                                                  Anti-Infec           



                                                  tive:           



                                                  Empiric           



                                                  Therapy           



                                                  for            



                                                  Suspected           



                                                  Infection<           



                                                  br>Empiric           



                                                  Therapy           



                                                  Site: Skin           



                                                  / Soft           



                                                  tissue<br>           



                                                  Duration           



                                                  of             



                                                  therapy:           



                                                  72 hours           

 

     enoxaparin      2022      Yes            40mg      40 mg,           Unive

rs



     (LOVENOX)      0-21                               Subcutaneo           ity 

of



     injection      22:00:                               us, DAILY,           [place]

[place]



     40 mg      00                                 First dose           Medical



                                                  on Fri           Branch



                                                  10/21/22           



                                                  at 1700,           



                                                  Until           



                                                  Discontinu           



                                                  ed,            



                                                  Routine           

 

     ceFEPIme      2022- No             2000mg      2,000 mg,           U

nivers



     (MAXIPIME)      0-21 10-26                          IV             ity of



     2,000 mg in      21:00: 20:59                          [place], [place]



     NaCl 0.9%      00   :00                           Q8H ABX,           Medica

l



     (NS) 50 mL                                         15 doses,           [facility]

ch



     MINI-BAG                                         First dose           



                                                  on Fri           



                                                  10/21/22           



                                                  at 1600,           



                                                  Last dose           



                                                  on Wed           



                                                  10/26/22           



                                                  at 0800,           



                                                  Administer           



                                                  over 4           



                                                  Hours, 50           



                                                  mL<br>Reas           



                                                  on for           



                                                  Anti-Infec           



                                                  tive:           



                                                  Empiric           



                                                  Therapy           



                                                  for            



                                                  Suspected           



                                                  Infection<           



                                                  br>Empiric           



                                                  Therapy           



                                                  Site:           



                                                  Abdominal<           



                                                  br>Duratio           



                                                  n of           



                                                  therapy: 5           



                                                  days           

 

     HYDROmorpho      2022      Yes            4mg       4 mg,           Unive

rs



     ne        0-21                               Oral,           ity of



     (DILAUDID)      19:38:                               Q4HPRN,           [place]

[place]



     tablet 4 mg      15                                 Starting           Medi

sarah



                                                  on Fri           Branch



                                                  10/21/22           



                                                  at 1438,           



                                                  Until           



                                                  Discontinu           



                                                  ed,            



                                                  Routine,           



                                                  Pain           



                                                  (scale           



                                                  7-10)           

 

     sodium      2022      Yes                      Topical,           [facility]



     hypochlorit      0-21                               BID, First           it

y of



     e 0.125 %      18:30:                               dose on           [place]



     (DAKIN'S                  Medical



     SOLUTION)                                         10/21/22           Branch



     solution                                         at 1330,           



                                                  Until           



                                                  Discontinu           



                                                  ed,            



                                                  Routine           

 

     ceFEPIme      2022- No             2000mg      2,000 mg,           U

nivers



     (MAXIPIME)      0-21 10-21                          IV             ity of



     2,000 mg in      13:15: 14:55                          [place], [place]



     NaCl 0.9%      00   :00                           ONCE, 1           Medical



     (NS) 50 mL                                         dose, On           [facility]

h



     MINI-BAG                                         Fri            



                                                  10/21/22           



                                                  at 0815,           



                                                  Administer           



                                                  over 30           



                                                  Minutes,           



                                                  50             



                                                  mL<br>Reas           



                                                  on for           



                                                  Anti-Infec           



                                                  tive:           



                                                  Empiric           



                                                  Therapy           



                                                  for            



                                                  Suspected           



                                                  Infection<           



                                                  br>Empiric           



                                                  Therapy           



                                                  Site:           



                                                  Urine<br>D           



                                                  uration of           



                                                  therapy: 5           



                                                  days           

 

     Sliding      2022      Yes                      Subcutaneo           Univ

ers



     Scale      0-21                               us, AC+HS,           ity of



     Insulin-Reg      12:30:                               First dose           

[place]



     ular + Fsbg      00                                 on Fri           Medica

l



     Testing                                         10/21/22           [facility]



                                                  at 0730,           



                                                  Until           



                                                  Discontinu           



                                                  ed,            



                                                  Routine           

 

     acetaminoph      2022      Yes            650mg      650 mg,           Un

[facility]



     en        0-                               Oral,           ity of



     (TYLENOL)      12:08:                               Q6HPRN,           [place]



     tablet 650      12                                 Starting           Medic

al



     mg                                           on Fri           Branch



                                                  10/21/22           



                                                  at 0708,           



                                                  Until           



                                                  Discontinu           



                                                  ed,            



                                                  Routine,           



                                                  Pain           



                                                  (scale           



                                                  1-3)           

 

     HYDROmorpho      2022- No             .5mg      0.5 mg,           Un

[facility]



     ne        0-21 10-21                          Slow IV           ity of



     (DILAUDID)      12:07: 16:23                          Push,           [place]



     injection      21   :00                           Q4HPRN, 2           Medic

al



     0.5 mg                                         doses,           Branch



                                                  Starting           



                                                  on Fri           



                                                  10/21/22           



                                                  at 0707,           



                                                  Until           



                                                  Discontinu           



                                                  ed,            



                                                  Routine,           



                                                  Pain           



                                                  (scale           



                                                  7-10)<br>U           



                                                  se             



                                                  approved           



                                                  by             



                                                  (Faculty):           



                                                  ADC            



                                                  PROVIDER           

 

     proMETHazin      2022      Yes            12.5mg      12.5 mg,           

[facility]



     e         0                               IV             ity of



     (PHENERGAN)      12:07:                               Piggyback,           

[place]



     12.5 mg in      04                                 Q4HPRN,           Medica

l



     NaCl 0.9%                                         Starting           Br[facility]



     (NS) 50 mL                                         on Fri           



     IV                                           10/21/22           



     piggyback                                         at 0707,           



                                                  Until           



                                                  Discontinu           



                                                  ed,            



                                                  Routine,           



                                                  Nausea and           



                                                  Vomiting           



                                                  (N/V)           

 

     dextrose      2022      Yes            250mL      250 mL, IV           Un

[facility]



     10% (D10W)      0-21                               Infusion,           ity 

of



     bolus      11:02:                               PRN - SEE           [place]



     infusion      05                                 INSTRUCTIO           Medic

al



     250 mL                                         NS,            [facility]



                                                  Administer           



                                                  over 60           



                                                  Minutes,           



                                                  Other, If           



                                                  blood           



                                                  glucose is           



                                                  &amp;lt;           



                                                  or = 70           



                                                  mg/dL and           



                                                  patient is           



                                                  unable to           



                                                  swallow or           



                                                  has mental           



                                                  status           



                                                  changes,           



                                                  Starting           



                                                  on Fri           



                                                  10/21/22           



                                                  at             



                                                  0602<br>If           



                                                  blood           



                                                  glucose is           



                                                  < or = 70           



                                                  mg/dL and           



                                                  patient is           



                                                  unable to           



                                                  swallow or           



                                                  has mental           



                                                  status           



                                                  changes           



                                                  (Give           



                                                  glucagon           



                                                  order if           



                                                  patient           



                                                  needs           



                                                  fluid           



                                                  restrictio           



                                                  n):            



                                                  &nbsp;IF           



                                                  IV access           



                                                  available:           



                                                  Dextrose           



                                                  10%.           



                                                  &nbsp;1.           



                                                  125 mL (?           



                                                  bag) of           



                                                  D10W IV           



                                                  infusion -           



                                                  equivalent           



                                                  to 12.5 g           



                                                  dextrose           



                                                  &nbsp;2.           



                                                  Blood           



                                                  glucose -           



                                                  draw blood           



                                                  glucose 15           



                                                  minutes           



                                                  after D10W           



                                                  Administra           



                                                  tion.           



                                                  &amp;nbsp;           



                                                  3. If           



                                                  blood           



                                                  glucose is           



                                                  < 80           



                                                  mg/dL,           



                                                  repeat.<br           



                                                  >              

 

     acetaminoph      2022      Yes            650mg      650 mg,           Un

[place]



     en                                       Oral,           ity of



     (TYLENOL)      11:01:                               Q6HPRN,           [place]



     tablet 650      26                                 Starting           Medic

al



     mg                                           on Fri           [facility]



                                                  10/21/22           



                                                  at 0601,           



                                                  Until           



                                                  Discontinu           



                                                  ed,            



                                                  Routine,           



                                                  Pain           



                                                  (scale           



                                                  1-3)           

 

     iopamidol      2022- No        [id number] 100mL      100 mL,          

 [facility]



     (ISOVUE      0-21 10-21                          Intravenou           ity o

f



     370-500 mL)      08:00: 08:00                          s, ONCE, 1          

 [place]



     injection      00   :00                           dose, On           Medica

l



     100 mL                                         Fri            [facility]



                                                  10/21/22           



                                                  at 0300,           



                                                  Routine           

 

     ceFEPIme      2022- No             1000mg      1,000 mg,           U

nivers



     (MAXIPIME)      0-21 10-21                          IV             ity of



     injection      07:15: 07:36                          Piggyback,           [place]

[place]



     1,000 mg      00   :00                           ONCE, 1           Medical



                                                  dose, On           [facility]anch



                                                  Fri            



                                                  10/21/22           



                                                  at 0215,           



                                                  STAT<br>Re           



                                                  ason for           



                                                  Anti-Infec           



                                                  tive:           



                                                  Documented           



                                                  Infection<           



                                                  br>Documen           



                                                  huma            



                                                  Infection           



                                                  Site: Skin           



                                                  / Soft           



                                                  Tissue<br>           



                                                  Duration           



                                                  of             



                                                  Therapy:           



                                                  Other (see           



                                                  Comments)           

 

     FENTanyl PF      2022- No             50ug      50 mcg,           Un

[facility]



     (SUBLIMAZE      0-21 10-21                          Slow IV           ity o

f



     (PF))      06:30: 06:12                          Push,           [place]



     injection      00   :00                           ONCE, 1           Medical



     50 mcg                                         dose, On           [facility]



                                                  Fri            



                                                  10/21/22           



                                                  at 0130,           



                                                  ASAP           

 

     proMETHazin      2022- No             12.5mg      12.5 mg,          

 [facility]



     e         0-21 10-21                          IV             ity of



     (PHENERGAN)      06:15: 06:12                          Piggyback,          

 [place]



     12.5 mg in      00   :00                           ONCE, 1           Medica

l



     NaCl 0.9%                                         dose, On           [facility]



     (NS) 50 mL                                         Fri            



     IV                                           10/21/22           



     piggyback                                         at 0115,           



                                                  ASAP           

 

     insulin            Yes            26U       26 Units,           Unive

rs



     glargine      8-14                               Subcutaneo           ity o

f



     (LANTUS      02:00:                               us, [facility],           [place]



     U-100)      00                                 First dose           Medical



     injection                                         (after           Branch



     26 Units                                         last           



                                                  modificati           



                                                  on) on Sat           



                                                  22 at           



                                                  2100,           



                                                  Until           



                                                  Discontinu           



                                                  ed,            



                                                  Routine           

 

     insulin       No             24U       24 Units,           Univ

ers



     glargine                                Subcutaneo           ity 

of



     (LANTUS      20:38: 20:43                          us, ONCE,           [place]

s



     U-100)      00   :00                           1 dose, On           Medical



     injection                                         Fri            Branch



     24 Units                                         22 at           



                                                  1545, ASAP           

 

     sennosides-            Yes            1{tbl}      1 tablet,          

 [facility]



     docusate                                     Oral,           ity of



     sodium      14:00:                               DAILY,           [place]



     (SENOKOT-S)      00                                 First dose           Me

dical



     8.6-50 mg                                         on Fri           Branch



     per tablet                                         22 at           



     1 tablet                                         0900,           



                                                  Until           



                                                  Discontinu           



                                                  ed,            



                                                  Routine           

 

     polyethylen            Yes            17g       17 g,           [facility]

rs



     e glycol                                     Oral,           ity of



     3350 powder      14:00:                               DAILY,           [place]

s



     17 g      00                                 First dose           Medical



                                                  on Fri           Branch



                                                  22 at           



                                                  0900,           



                                                  Until           



                                                  Discontinu           



                                                  ed,            



                                                  Routine           

 

     insulin NPH       No             16U       16 Units,           

[facility]



     (HUMULIN N)                                Subcutaneo           i

ty of



     injection      14:00: 17:23                          us,            [place]



     16 Units      00   :36                           QAM+HS,           Medical



                                                  First dose           Branch



                                                  (after           



                                                  last           



                                                  modificati           



                                                  on) on 22 at           



                                                  0900,           



                                                  Until           



                                                  Discontinu           



                                                  ed,            



                                                  Routine           

 

     Sliding            Yes                      Subcutaneo           Univ

ers



     Scale                                     us, TID           ity of



     Insulin -      13:00:                               MEALS+HS,           Eric

as



     Lispro      00                                 First dose           Medical



     (HumaLOG) +                                         (after           Branch



     Fsbg                                         last           



     Testing                                         modificati           



                                                  on) on 22 at           



                                                  0800,           



                                                  Until           



                                                  Discontinu           



                                                  ed,            



                                                  Routine           

 

     insulin NPH      2022- No             8U        8 Units,           U

nivers



     (HUMULIN N)                                Subcutaneo           i

ty of



     injection 8      02:00: 12:34                          us, QHS,           [facility]

[place]



     Units      00   :51                           First dose           Medical



                                                  (after           Branch



                                                  last           



                                                  modificati           



                                                  on) on Thu           



                                                  22 at           



                                                  2100,           



                                                  Until           



                                                  [facility]           



                                                  ed,            



                                                  Routine           

 

     repaglinide      2022-0      Yes       [id number] .5mg      Take 1          

 [facility]



     0.5 mg      8-12                               tablet by           ity of



     tablet      00:00:                               mouth in           [place]



               00                                 the            Medical



                                                  morning           Branch



                                                  and 1           



                                                  tablet at           



                                                  noon and 1           



                                                  tablet in           



                                                  the            



                                                  evening.           



                                                  Take           



                                                  before           



                                                  meals.           

 

     sodium      2022-0      Yes       [id number]           Apply to           Un

[facility]



     hypochlorit      8-12                               area(s)           ity o

f



     e 0.25%      00:00:                               daily.           [place]



     solution      00                                                Medical



                                                                 Branch

 

     repaglinide      2022-0      Yes       [id number] .5mg      Take 1          

 [facility]



     0.5 mg      8-12                               tablet by           ity of



     tablet      00:00:                               mouth in           [place]



               00                                 the            Medical



                                                  morning           Branch



                                                  and 1           



                                                  tablet at           



                                                  noon and 1           



                                                  tablet in           



                                                  the            



                                                  evening.           



                                                  Take           



                                                  before           



                                                  meals.           

 

     sodium      2022-0      Yes       [id number]           Apply to           Un

[facility]



     hypochlorit      8-12                               area(s)           ity o

f



     e 0.25%      00:00:                               daily.           [address]



                                                                 Branch

 

     repaglinide      2022-0      Yes       [id number] .5mg      Take 1          

 [facility]



     0.5 mg      8-12                               tablet by           ity of



     tablet      00:00:                               mouth in           [place]



               00                                 the            Medical



                                                  morning           [place]



                                                  and 1           



                                                  tablet at           



                                                  noon and 1           



                                                  tablet in           



                                                  the            



                                                  evening.           



                                                  Take           



                                                  before           



                                                  meals.           

 

     sodium      2022-0      Yes       [id number]           Apply to           Un

[facility]



     hypochlorit      8-12                               area(s)           ity o

f



     e 0.25%      00:00:                               daily.           [address]



                                                                 Branch

 

     repaglinide      2022-0      Yes       [id number] .5mg      Take 1          

 [facility]



     0.5 mg      8-12                               tablet by           ity of



     tablet      00:00:                               mouth in           [address]



                                                  morning           [place]



                                                  and 1           



                                                  tablet at           



                                                  noon and 1           



                                                  tablet in           



                                                  the            



                                                  evening.           



                                                  Take           



                                                  before           



                                                  meals.           

 

     sodium      2022-0      Yes       [id number]           Apply to           Un

[facility]



     hypochlorit      8-12                               area(s)           ity o

f



     e 0.25%      00:00:                               daily.           [address]



                                                                 Branch

 

     repaglinide      2022-0      Yes       [id number] .5mg      Take 1          

 [facility]



     0.5 mg      8-12                               tablet by           ity of



     tablet      00:00:                               mouth in           [place]



               00                                 the            Medical



                                                  morning           [place]



                                                  and 1           



                                                  tablet at           



                                                  noon and 1           



                                                  tablet in           



                                                  the            



                                                  evening.           



                                                  Take           



                                                  before           



                                                  meals.           

 

     sodium      2022-0      Yes       [id number]           Apply to           Un

[place]



     hypochlorit      8-12                               area(s)           ity o

f



     e 0.25%      00:00:                               daily.           [place]



     solution      00                                                Medical



                                                                 Branch

 

     repaglinide      2022-0      Yes       [id number] .5mg      Take 1          

 [facility]



     0.5 mg      8-12                               tablet by           ity of



     tablet      00:00:                               mouth in           [place]



               00                                 the            Medical



                                                  morning           [place]



                                                  and 1           



                                                  tablet at           



                                                  noon and 1           



                                                  tablet in           



                                                  the            



                                                  evening.           



                                                  Take           



                                                  before           



                                                  meals.           

 

     sodium      2022-0      Yes       [id number]           Apply to           Un

[facility]



     hypochlorit      8-12                               area(s)           ity o

f



     e 0.25%      00:00:                               daily.           [address]



                                                                 Branch

 

     repaglinide      2022-0      Yes       [id number] .5mg      Take 1          

 [facility]



     0.5 mg      8-12                               tablet by           ity of



     tablet      00:00:                               mouth in           [address]



                                                  morning           [place]



                                                  and 1           



                                                  tablet at           



                                                  noon and 1           



                                                  tablet in           



                                                  the            



                                                  evening.           



                                                  Take           



                                                  before           



                                                  meals.           

 

     sodium      2022-0      Yes       [id number]           Apply to           Un

[facility]



     hypochlorit      8-12                               area(s)           ity o

f



     e 0.25%      00:00:                               daily.           [address]



                                                                 Branch

 

     repaglinide      2-0      Yes       [id number] .5mg      Take 1          

 [facility]



     0.5 mg      8-12                               tablet by           ity of



     tablet      00:00:                               mouth in           [place]



               00                                 the            [facility]



                                                  and 1           



                                                  tablet at           



                                                  noon and 1           



                                                  tablet in           



                                                  the            



                                                  evening.           



                                                  Take           



                                                  before           



                                                  meals.           

 

     sodium      2022-0      Yes       [id number]           Apply to           Un

[facility]



     hypochlorit      8-12                               area(s)           ity o

f



     e 0.25%      00:00:                               daily.           [address]



                                                                 Branch

 

     repaglinide      2022-0      Yes       [id number] .5mg      Take 1          

 [facility]



     0.5 mg      8-12                               tablet by           ity of



     tablet      00:00:                               mouth in           [place]



               00                                 the            Medical



                                                  morning           [place]



                                                  and 1           



                                                  tablet at           



                                                  noon and 1           



                                                  tablet in           



                                                  the            



                                                  evening.           



                                                  Take           



                                                  before           



                                                  meals.           

 

     sodium      2022-0      Yes       [id number]           Apply to           Un

[facility]



     hypochlorit      8-12                               area(s)           ity o

f



     e 0.25%      00:00:                               daily.           [place]



     solution      00                                                Medical



                                                                 Branch

 

     repaglinide      2022-0      Yes       [id number] .5mg      Take 1          

 [facility]



     0.5 mg      8-12                               tablet by           ity of



     tablet      00:00:                               mouth in           [address]



                                                  morning           [place]



                                                  and 1           



                                                  tablet at           



                                                  noon and 1           



                                                  tablet in           



                                                  the            



                                                  evening.           



                                                  Take           



                                                  before           



                                                  meals.           

 

     sodium      2022-0      Yes       [id number]           Apply to           Un

[facility]



     hypochlorit      8-12                               area(s)           ity o

f



     e 0.25%      00:00:                               daily.           [place]



     solution                                                      [facility]

 

     repaglinide      -0      Yes       [id number] .5mg      Take 1          

 [facility]



     0.5 mg      8-12                               tablet by           ity of



     tablet      00:00:                               mouth in           [address]



                                                  and 1           



                                                  tablet at           



                                                  noon and 1           



                                                  tablet in           



                                                  the            



                                                  evening.           



                                                  Take           



                                                  before           



                                                  meals.           

 

     sodium      2-0      Yes       [id number]           Apply to           Un

[facility]



     hypochlorit      8-12                               area(s)           ity o

f



     e 0.25%      00:00:                               daily.           [place]



     solution                                                      [facility]

 

     repaglinide      -0      Yes       [id number] .5mg      Take 1          

 [facility]



     0.5 mg      8-12                               tablet by           ity of



     tablet      00:00:                               mouth in           [address]



                                                  and 1           



                                                  tablet at           



                                                  noon and 1           



                                                  tablet in           



                                                  the            



                                                  evening.           



                                                  Take           



                                                  before           



                                                  meals.           

 

     sodium      2022-0      Yes       [id number]           Apply to           Un

[facility]



     hypochlorit      8-12                               area(s)           ity o

f



     e 0.25%      00:00:                               daily.           [place]



     solution                                                      [facility]

 

     sodium      2022-0      Yes       [id number]           Apply to           Un

[facility]



     hypochlorit      8-12                               area(s)           ity o

f



     e 0.25%      00:00:                               daily.           [place]



     solution                                                      [facility]

 

     sodium      2022-0      Yes       [id number]           Apply to           Un

[facility]



     hypochlorit      8-12                               area(s)           ity o

f



     e 0.25%      00:00:                               daily.           [place]



     solution                                                      [facility]

 

     sodium      2022-0      Yes       [id number]           Apply to           Un

[facility]



     hypochlorit      8-12                               area(s)           ity o

f



     e 0.25%      00:00:                               daily.           [place]



     solution                                                      [facility]

 

     sodium      2022-0      Yes       [id number]           Apply to           Un

[facility]



     hypochlorit      8-12                               area(s)           ity o

f



     e 0.25%      00:00:                               daily.           [place]



     solution                                                      [facility]

 

     sodium      2022-0      Yes       [id number]           Apply to           Un

[facility]



     hypochlorit      8-12                               area(s)           ity o

f



     e 0.25%      00:00:                               daily.           [place]



     solution      00                                                Medical



                                                                 Branch

 

     sodium      2022-0      Yes       [id number]           Apply to           Un

[facility]



     hypochlorit      8-12                               area(s)           ity o

f



     e 0.25%      00:00:                               daily.           [place]



     solution      00                                                Medical



                                                                 Branch

 

     sodium      2022-0      Yes       [id number]           Apply to           Un

[facility]



     hypochlorit      8-12                               area(s)           ity o

f



     e 0.25%      00:00:                               daily.           [place]



     solution      00                                                Medical



                                                                 Branch

 

     sodium      2022-0      Yes       [id number]           Apply to           [facility]

[facility]



     hypochlorit      8-12                               area(s)           ity o

f



     e 0.25%      00:00:                               daily.           [place]



     solution      00                                                Medical



                                                                 Branch

 

     sodium      2022-0      Yes       [id number]           Apply to           Un

[facility]



     hypochlorit      8-12                               area(s)           ity o

f



     e 0.25%      00:00:                               daily.           [place]



     solution      00                                                Medical



                                                                 Branch

 

     sodium      2022-0      Yes       [id number]           Apply  to           [facility]

[facility]



     hypochlorit      8-12                               area(s)           ity o

f



     e 0.25%      00:00:                               daily.           [place]



     solution      00                                                Medical



                                                                 Branch

 

     sodium      2022-0      Yes       [id number]           Apply to           Un

[facility]



     hypochlorit      8-12                               area(s)           ity o

f



     e 0.25%      00:00:                               daily.           [place]



     solution      00                                                Medical



                                                                 Branch

 

     sodium      2022-0      Yes       [id number]           Apply to           Un

[facility]



     hypochlorit      8-12                               area(s)           ity o

f



     e 0.25%      00:00:                               daily.           [place]



     solution      00                                                Medical



                                                                 Branch

 

     sodium      2022-0      Yes       [id number]           Apply to           Un

[facility]



     hypochlorit      8-12                               area(s)           ity o

f



     e 0.25%      00:00:                               daily.           [place]



     solution      00                                                Medical



                                                                 Branch

 

     sodium      2022-0      Yes       [id number]           Apply to           Un

[facility]



     hypochlorit      8-12                               area(s)           ity o

f



     e 0.25%      00:00:                               daily.           [place]



     solution      00                                                Medical



                                                                 Branch

 

     sodium      2022-0      Yes       [id number]           Apply to           Un

[facility]



     hypochlorit      8-12                               area(s)           ity o

f



     e 0.25%      00:00:                               daily.           [place]



     solution      00                                                Medical



                                                                 Branch

 

     sodium      2022-0      Yes       [id number]           Apply to           Un

[facility]



     hypochlorit      8-12                               area(s)           ity o

f



     e 0.25%      00:00:                               daily.           [place]



     solution      00                                                Medical



                                                                 Branch

 

     sodium      2022-0      Yes       [id number]           Apply to           Un

[facility]



     hypochlorit      8-12                               area(s)           ity o

f



     e 0.25%      00:00:                               daily.           [place]



     solution      00                                                Medical



                                                                 Branch

 

     sodium      2022-0      Yes       [id number]           Apply to           Un

[facility]



     hypochlorit      8-12                               area(s)           ity o

f



     e 0.25%      00:00:                               daily.           [place]



     solution      00                                                Medical



                                                                 Branch

 

     sodium      2022-0      Yes       [id number]           Apply to           Un

[facility]



     hypochlorit      8-12                               area(s)           ity o

f



     e 0.25%      00:00:                               daily.           [place]



     solution      00                                                Medical



                                                                 Branch

 

     sodium      2022-0      Yes       [id number]           Apply to           Un

[facility]



     hypochlorit      8-12                               area(s)           ity o

f



     e 0.25%      00:00:                               daily.           [place]



     solution      00                                                Medical



                                                                 Branch

 

     sodium      2022-0      Yes       [id number]           Apply to           Un

[facility]



     hypochlorit      8-12                               area(s)           ity o

f



     e 0.25%      00:00:                               daily.           [place]



     solution      00                                                Medical



                                                                 Branch

 

     sodium      2022-0      Yes       [id number]           Apply to           Un

[facility]



     hypochlorit      8-12                               area(s)           ity o

f



     e 0.25%      00:00:                               daily.           [place]



     solution      00                                                Medical



                                                                 Branch

 

     sodium      2022-0      Yes       [id number]           Apply to           Un

[facility]



     hypochlorit      8-12                               area(s)           ity o

f



     e 0.25%      00:00:                               daily.           [place]



     solution      00                                                Medical



                                                                 Branch

 

     sodium      2022-0      Yes       [id number]           Apply to           Un

[facility]



     hypochlorit      8-12                               area(s)           ity o

f



     e 0.25%      00:00:                               daily.           [place]



     solution      00                                                Medical



                                                                 Branch

 

     sodium      2022-0      Yes       [id number]           Apply to           Un

[facility]



     hypochlorit      8-12                               area(s)           ity o

f



     e 0.25%      00:00:                               daily.           [place]



     solution      00                                                Medical



                                                                 Branch

 

     sodium      2022-0      Yes       [id number]           Apply to           

[facility]



     hypochlorit      8-12                105            area(s)           ity o

f



     e 0.25%      00:00:                               daily.           [place]



     solution      00                                                Medical



                                                                 Branch

 

     sodium      2022-0      Yes       [id number]           Apply to           

[facility]



     hypochlorit      8-12                105            area(s)           ity o

f



     e 0.25%      00:00:                               daily.           [place]



     solution      00                                                Medical



                                                                 Branch

 

     sodium      2-0      Yes       [id number]           Apply to           

[facility]



     hypochlorit      8-12                105            area(s)           ity o

f



     e 0.25%      00:00:                               daily.           [place]



     solution      00                                                Medical



                                                                 Branch

 

     sodium      2022-0      Yes       [id number]           Apply to           

[facility]



     hypochlorit      8-12                105            area(s)           ity o

f



     e 0.25%      00:00:                               daily.           [place]



     solution      00                                                Medical



                                                                 Branch

 

     sodium      2022-0      Yes       [id number]           Apply to           

[facility]



     hypochlorit      8-12                105            area(s)           ity o

f



     e 0.25%      00:00:                               daily.           [place]



     solution      00                                                Medical



                                                                 Branch

 

     sodium      2-0      Yes       [id number]           Apply to           

[facility]



     hypochlorit      8-12                105            area(s)           ity o

f



     e 0.25%      00:00:                               daily.           [place]



     solution      00                                                Medical



                                                                 Branch

 

     sodium      2-0      Yes       [id number]           Apply to           

[facility]



     hypochlorit      8-12                105            area(s)           ity o

f



     e 0.25%      00:00:                               daily.           [place]



     solution      00                                                Medical



                                                                 Branch

 

     sodium      2022-0      Yes       [id number]           Apply to           

[facility]



     hypochlorit      8-12                105            area(s)           ity o

f



     e 0.25%      00:00:                               daily.           [place]



     solution      00                                                Medical



                                                                 Branch

 

     sodium      2022-0      Yes       [id number]           Apply to           

[facility]



     hypochlorit      8-12                105            area(s)           ity o

f



     e 0.25%      00:00:                               daily.           [place]



     solution      00                                                Medical



                                                                 Branch

 

     sodium      2022-0      Yes       [id number]           Apply to           

[facility]



     hypochlorit      8-12                105            area(s)           ity o

f



     e 0.25%      00:00:                               daily.           [place]



     solution      00                                                Medical



                                                                 Branch

 

     sodium      2022-0      Yes       [id number]           Apply to           

[facility]



     hypochlorit      8-12                105            area(s)           ity o

f



     e 0.25%      00:00:                               daily.           [place]



     solution      00                                                Medical



                                                                 Branch

 

     sodium      2022-0      Yes       [id number]           Apply to           

[facility]



     hypochlorit      8-12                105            area(s)           ity o

f



     e 0.25%      00:00:                               daily.           [place]



     solution      00                                                Medical



                                                                 Branch

 

     sodium      2022-0      Yes       [id number]           Apply to           

[facility]



     hypochlorit      8-12                105            area(s)           ity o

f



     e 0.25%      00:00:                               daily.           [place]



     solution      00                                                Medical



                                                                 Branch

 

     sodium      2022-0      Yes       [id number]           Apply to           

[facility]



     hypochlorit      8-12                105            area(s)           ity o

f



     e 0.25%      00:00:                               daily.           [place]



     solution      00                                                Medical



                                                                 Branch

 

     sodium      2022-0      Yes       [id number]           Apply to           

[facility]



     hypochlorit      8-12                105            area(s)           ity o

f



     e 0.25%      00:00:                               daily.           [place]



     solution      00                                                Medical



                                                                 Branch

 

     sodium      2022-0      Yes       [id number]           Apply to           

[facility]



     hypochlorit      8-12                105            area(s)           ity o

f



     e 0.25%      00:00:                               daily.           [place]



     solution      00                                                Medical



                                                                 Branch

 

     sodium      2022-0      Yes       [id number]           Apply to           

[facility]



     hypochlorit      8-12                105            area(s)           ity o

f



     e 0.25%      00:00:                               daily.           [place]



     solution      00                                                Medical



                                                                 Branch

 

     sodium      2022-0      Yes       [id number]           Apply to           

[facility]



     hypochlorit      8-12                105            area(s)           ity o

f



     e 0.25%      00:00:                               daily.           [place]



     solution      00                                                Medical



                                                                 Branch

 

     sodium      2022-0      Yes       [id number]           Apply to           

[facility]



     hypochlorit      8-12                105            area(s)           ity o

f



     e 0.25%      00:00:                               daily.           [place]



     solution      00                                                Medical



                                                                 Branch

 

     sodium      2022-0      Yes       [id number]           Apply to           

[facility]



     hypochlorit      8-12                105            area(s)           ity o

f



     e 0.25%      00:00:                               daily.           [place]



     solution      00                                                Medical



                                                                 Branch

 

     sodium      2022-0      Yes       [id number]           Apply to           

[facility]



     hypochlorit      8-12                105            area(s)           ity o

f



     e 0.25%      00:00:                               daily.           [place]



     solution      00                                                Medical



                                                                 Branch

 

     sodium      2022-0      Yes       [id number]           Apply to           

[facility]



     hypochlorit      8-12                105            area(s)           ity o

f



     e 0.25%      00:00:                               daily.           [place]



     solution      00                                                Medical



                                                                 Branch

 

     sodium      2022-0      Yes       [id number]           Apply to           

[facility]



     hypochlorit      8-12                105            area(s)           ity o

f



     e 0.25%      00:00:                               daily.           [place]



     solution      00                                                Medical



                                                                 Branch

 

     sodium      2022-0      Yes       [id number]           Apply to           

[facility]



     hypochlorit      8-12                105            area(s)           ity o

f



     e 0.25%      00:00:                               daily.           [place]



     solution      00                                                [facility]

 

     repaglinide      2022- No        [id number] .5mg      Take 1         

  [facility]



     0.5 mg                                tablet by           ity of



     tablet      00:00: 00:00                          mouth in           [place]



               00   :00                           the            [facility]



                                                  and 1           



                                                  tablet at           



                                                  noon and 1           



                                                  tablet in           



                                                  the            



                                                  evening.           



                                                  Take           



                                                  before           



                                                  meals.           

 

     repaglinide      2022- No        [id number] .5mg      Take 1         

  [facility]



     0.5 mg                                tablet by           ity of



     tablet      00:00: 00:00                          mouth in           [place]



               00   :00                           the            [facility]



                                                  and 1           



                                                  tablet at           



                                                  noon and 1           



                                                  tablet in           



                                                  the            



                                                  evening.           



                                                  Take           



                                                  before           



                                                  meals.           

 

     repaglinide      2022- No        [id number] .5mg      Take 1         

  [facility]



     0.5 mg                                tablet by           ity of



     tablet      00:00: 00:00                          mouth in           [place]



               00   :00                           [facility]



                                                  and 1           



                                                  tablet at           



                                                  noon and 1           



                                                  tablet in           



                                                  the            



                                                  evening.           



                                                  Take           



                                                  before           



                                                  meals.           

 

     apixaban 5      2022- No             5mg       Take 1           [facility]

ers



     mg tablet                                tablet by           ity 

of



               00:00: 04:59                          mouth in           [place]



               00   :00                           [facility]



                                                  and 1           



                                                  tablet in           



                                                  the            



                                                  evening.           



                                                  Do all           



                                                  this for           



                                                  30 days.           



                                                  Indication           



                                                  s:             



                                                  Symtomatic           



                                                  Superficia           



                                                  l Vein           



                                                  thrombosis           

 

     insulin      2022- No        [id number] 24U       inject 24          

 [facility]



     glargine                                Units           ity of



     100 unit/mL      00:00: 04:59                          under the           

[place]



     injection      00   :00                           skin at           [facility]



                                                  bedtime           [place]



                                                  for 30           



                                                  days.           

 

     metFORMIN      2022- No        [id number] 500mg      Take 1          

 [facility]



     500 mg                                tablet by           ity of



     tablet      00:00: 04:59                          mouth in           [place]



               00   :00                           [facility]



                                                  and 1           



                                                  tablet in           



                                                  the            



                                                  evening.           



                                                  Take with           



                                                  meals. Do           



                                                  all this           



                                                  for 30           



                                                  days.           

 

     dulaglutide      2- No        [id number] .75mg      inject 1      

     [facility]



     (TRULICITY)                                Pen under           it

y of



     0.75 mg/0.5      00:00: 04:59                          the skin           [facility]

[place]



     mL PnIj      00   :00                           weekly for           [facility]

l



                                                  30 days.           Branch

 

     apixaban 5      2- No             5mg       Take 1           [facility]

ers



     mg tablet                                tablet by           ity 

of



               00:00: 04:59                          mouth in           [place]



               00   :00                           the            Medical



                                                  morning           Branch



                                                  and 1           



                                                  tablet in           



                                                  the            



                                                  evening.           



                                                  Do all           



                                                  this for           



                                                  30 days.           



                                                  Indication           



                                                  s:             



                                                  Symtomatic           



                                                  Superficia           



                                                  l Vein           



                                                  thrombosis           

 

     insulin      -2022- No        [id number] 24U       inject 24          

 [facility]



     glargine                                Units           ity of



     100 unit/mL      00:00: 04:59                          under the           

[place]



     injection      00   :00                           skin at           [facility]



                                                  for 30           



                                                  days.           

 

     metFORMIN      -2022- No        [id number] 500mg      Take 1          

 [facility]



     500 mg                                tablet by           ity of



     tablet      00:00: 04:59                          mouth in           [place]



               00   :00                           the            Medical



                                                  morning           [place]



                                                  and 1           



                                                  tablet in           



                                                  the            



                                                  evening.           



                                                  Take with           



                                                  meals. Do           



                                                  all this           



                                                  for 30           



                                                  days.           

 

     dulaglutide      2022- No        [id number] .75mg      inject 1      

     [facility]



     (TRULICITY)                                Pen under           it

y of



     0.75 mg/0.5      00:00: 04:59                          the skin           [facility]

[place]



     mL PnIj      00   :00                           weekly for           [facility]

l



                                                  30 days.           [facility]

 

     apixaban 5      2022- No             5mg       Take 1           Un[facility]

ers



     mg tablet                                tablet by           ity 

of



               00:00: 04:59                          mouth in           [place]



               00   :00                           the            Medical



                                                  morning           [place]



                                                  and 1           



                                                  tablet in           



                                                  the            



                                                  evening.           



                                                  Do all           



                                                  this for           



                                                  30 days.           



                                                  Indication           



                                                  s:             



                                                  Symtomatic           



                                                  Superficia           



                                                  l Vein           



                                                  thrombosis           

 

     insulin      2022- No        [id number] 24U       inject 24          

 [facility]



     glargine                                Units           ity of



     100 unit/mL      00:00: 04:59                          under the           

[place]



     injection      00   :00                           skin [facility]



                                                  for 30           



                                                  days.           

 

     metFORMIN      -2022- No        [id number] 500mg      Take 1          

 [facility]



     500 mg                                tablet by           ity of



     tablet      00:00: 04:59                          mouth in           [place]



               00   :00                           the            Medical



                                                  morning           [facility]



                                                  and 1           



                                                  tablet in           



                                                  the            



                                                  evening.           



                                                  Take with           



                                                  meals. Do           



                                                  all this           



                                                  for 30           



                                                  days.           

 

     dulaglutide      2022022- No        [id number] .75mg      inject 1      

     [facility]



     (TRULICITY)                                Pen under           it

y of



     0.75 mg/0.5      00:00: 04:59                          the skin           [facility]

[place]



     mL PnIj      00   :00                           weekly for           [facility]

l



                                                  30 days.           [facility]

 

     apixaban 5      2- No             5mg       Take 1           Univ

ers



     mg tablet                                tablet by           ity 

of



               00:00: 04:59                          mouth in           [place]



               00   :00                           the            Medical



                                                  morning           [facility]



                                                  and 1           



                                                  tablet in           



                                                  the            



                                                  evening.           



                                                  Do all           



                                                  this for           



                                                  30 days.           



                                                  Indication           



                                                  s:             



                                                  Symtomatic           



                                                  Superficia           



                                                  l Vein           



                                                  thrombosis           

 

     insulin      2022- No        [id number] 24U       inject 24          

 [facility]



     glargine                                Units           ity of



     100 unit/mL      00:00: 04:59                          under the           

[place]



     injection      00   :00                           skin at           [facility]



                                                  bedtime           [facility]



                                                  for 30           



                                                  days.           

 

     metFORMIN      2022- No        [id number] 500mg      Take 1          

 [facility]



     500 mg                                tablet by           ity of



     tablet      00:00: 04:59                          mouth in           [place]



               00   :00                           the            Medical



                                                  morning           [facility]



                                                  and 1           



                                                  tablet in           



                                                  the            



                                                  evening.           



                                                  Take with           



                                                  meals. Do           



                                                  all this           



                                                  for 30           



                                                  days.           

 

     dulaglutide      2022- No        [id number] .75mg      inject 1      

     [facility]



     (TRULICITY)                                Pen under           it

y of



     0.75 mg/0.5      00:00: 04:59                          the skin           [facility]

[place]



     mL PnIj      00   :00                           weekly for           [facility]

l



                                                  30 days.           [facility]

 

     linezolid      2022- No        [id number] 600mg      Take 1          

 [facility]



     600 mg                                tablet by           ity of



     tablet      00:00: 04:59                          mouth           [place]



               00   :00                           every 12           Medical



                                                  (twelve)           [facility]



                                                  hours for           



                                                  14 days.           

 

     fluconazole      2022- No        [id number] 400mg      Take 2        

   [facility]



     200 mg                                tablets by           ity of



     tablet      00:00: 04:59                          mouth in           [place]



               00   :00                           the            Medical



                                                  morning           [facility]



                                                  for 14           



                                                  days.           

 

     ciprofloxac      2022- No        [id number] 500mg      Take 2        

   [facility]



     in HCl 250                                tablets by           it

y of



     mg tablet      00:00: 04:59                          mouth in           [place]

as



               00   :00                           the            Medical



                                                  morning           [facility]



                                                  and 2           



                                                  tablets in           



                                                  the            



                                                  evening.           



                                                  Do all           



                                                  this for           



                                                  14 days.           

 

     linezolid      -0 - No        [id number] 600mg      Take 1          

 [facility]



     600 mg      8-12 08-27                          tablet by           ity of



     tablet      00:00: 04:59                          mouth           [place]



               00   :00                           every 12           Medical



                                                  (twelve)           [facility]



                                                  hours for           



                                                  14 days.           

 

     fluconazole      -0 - No        [id number] 400mg      Take 2        

   [facility]



     200 mg      8-12 08-27                          tablets by           ity of



     tablet      00:00: 04:59                          mouth in           [place]



               00   :00                           the            Medical



                                                  morning           [facility]



                                                  for 14           



                                                  days.           

 

     ciprofloxac      -0 - No        [id number] 500mg      Take 2        

   [facility]



     in HCl 250      8-12 08-27                          tablets by           it

y of



     mg tablet      00:00: 04:59                          mouth in           [place]

as



               00   :00                           the            Medical



                                                  morning           [facility]



                                                  and 2           



                                                  tablets in           



                                                  the            



                                                  evening.           



                                                  Do all           



                                                  this for           



                                                  14 days.           

 

     linezolid      -0 - No        [id number] 600mg      Take 1          

 [facility]



     600 mg      8-12 08-27                          tablet by           ity of



     tablet      00:00: 04:59                          mouth           [place]



               00   :00                           every 12           Medical



                                                  (twelve)           [place]



                                                  hours for           



                                                  14 days.           

 

     fluconazole      -0 - No        [id number] 400mg      Take 2        

   [facility]



     200 mg      8-12 08-27                          tablets by           ity of



     tablet      00:00: 04:59                          mouth in           [place]



               00   :00                           the            Medical



                                                  morning           [facility]



                                                  for 14           



                                                  days.           

 

     ciprofloxac      -0 - No        [id number] 500mg      Take 2        

   [facility]



     in HCl 250      8-12 08-27                          tablets by           it

y of



     mg tablet      00:00: 04:59                          mouth in           [place]

as



               00   :00                           the            Medical



                                                  morning           [facility]



                                                  and 2           



                                                  tablets in           



                                                  the            



                                                  evening.           



                                                  Do all           



                                                  this for           



                                                  14 days.           

 

     linezolid      -0 - No        [id number] 600mg      Take 1          

 [facility]



     600 mg      8-12 08-27                          tablet by           ity of



     tablet      00:00: 04:59                          mouth           [place]



               00   :00                           every 12           [facility]



                                                  (twelve)           [place]



                                                  hours for           



                                                  14 days.           

 

     fluconazole      -0 - No        [id number] 400mg      Take 2        

   [facility]



     200 mg      8-12 08-27                          tablets by           ity of



     tablet      00:00: 04:59                          mouth in           [place]



               00   :00                           the            Medical



                                                  morning           [facility]



                                                  for 14           



                                                  days.           

 

     ciprofloxac       No        [id number] 500mg      Take 2        

   [facility]



     in HCl 250                                tablets by           it

y of



     mg tablet      00:00: 04:59                          mouth in           [place]

as



               00   :00                           the            Medical



                                                  morning           [facility]



                                                  and 2           



                                                  tablets in           



                                                  the            



                                                  evening.           



                                                  Do all           



                                                  this for           



                                                  14 days.           

 

     gabapentin      2022- No        [id number] 300mg      Take 1         

  [facility]



     300 mg                                capsule by           ity of



     capsule      00:00: 04:59                          mouth in           [place]



               00   :00                           the            Medical



                                                  morning           [facility]



                                                  and 1           



                                                  capsule at           



                                                  noon and 1           



                                                  capsule in           



                                                  the            



                                                  evening.           



                                                  Do all           



                                                  this for 7           



                                                  days.           

 

     gabapentin      2022- No        [id number] 300mg      Take 1         

  [facility]



     300 mg                                capsule by           ity of



     capsule      00:00: 04:59                          mouth in           [place]



               00   :00                           the            Medical



                                                  morning           [place]



                                                  and 1           



                                                  capsule at           



                                                  noon and 1           



                                                  capsule in           



                                                  the            



                                                  evening.           



                                                  Do all           



                                                  this for 7           



                                                  days.           

 

     gabapentin      2022- No        [id number] 300mg      Take 1         

  [facility]



     300 mg                                capsule by           ity of



     capsule      00:00: 04:59                          mouth in           [place]



               00   :00                           the            Medical



                                                  morning           [place]



                                                  and 1           



                                                  capsule at           



                                                  noon and 1           



                                                  capsule in           



                                                  the            



                                                  evening.           



                                                  Do all           



                                                  this for 7           



                                                  days.           

 

     gabapentin      2022- No        [id number] 300mg      Take 1         

  [facility]



     300 mg                                capsule by           ity of



     capsule      00:00: 04:59                          mouth in           [place]



               00   :00                           the            Medical



                                                  morning           [place]



                                                  and 1           



                                                  capsule at           



                                                  noon and 1           



                                                  capsule in           



                                                  the            



                                                  evening.           



                                                  Do all           



                                                  this for 7           



                                                  days.           

 

     sodium      2022- No        [id number]           [facility]           [facility]



     hypochlorit                                area(s)           ity 

of



     e 0.25%      00:00: 00:00                          daily.           [place]



     solution      00   :00                                          Medical



                                                                 [facility]

 

     repaglinide      2022- No        [id number] .5mg      Take 1         

  [facility]



     0.5 mg                                tablet by           ity of



     tablet      00:00: 00:00                          mouth in           [place]



               00   :00                           the            Medical



                                                  morning           [place]



                                                  and 1           



                                                  tablet at           



                                                  noon and 1           



                                                  tablet in           



                                                  the            



                                                  evening.           



                                                  Take           



                                                  before           



                                                  meals. Do           



                                                  all this           



                                                  for 30           



                                                  days.           

 

     Sliding      2022- No                       Subcutaneo           Uni

vers



     Scale      8-11 08-12                          us, TID           ity of



     Insulin -      22:00: 12:34                          MEALS+HS,           [place]

[place]



     Lispro      00   :51                           First dose           Medical



     (HumaLOG) +                                         (after           [facility]



     Fsbg                                         last           



     Testing                                         modificati           



                                                  on) on Thu           



                                                  22 at           



                                                  1700,           



                                                  Until           



                                                  Discontinu           



                                                  ed,            



                                                  Routine           

 

     acetaminoph      0      Yes            1000mg      1,000 mg,          

 [facility]



     en        8-11                               Oral, Q8H,           ity of



     (TYLENOL)      19:00:                               First dose           [facility]

[place]



     tablet      00                                 on Thu           Medical



     1,000 mg                                         22 at           [facility]

h



                                                  1400,           



                                                  Until           



                                                  Discontinu           



                                                  ed,            



                                                  Routine           

 

     methocarbam            Yes            500mg      500 mg,           Un

[facility]



     oL        811                               Oral, Q6H,           ity of



     (ROBAXIN)      17:00:                               First dose           Te

[facility]



     tablet 500      00                                 on Thu           Medical



     mg                                           22 at           [facility]



                                                  1200,           



                                                  Until           



                                                  Discontinu           



                                                  ed,            



                                                  Routine           

 

     Sliding      0 - No                       Subcutaneo           Uni

vers



     Scale                                , TID           ity of



     Insulin -      17:00: 19:31                          MEALS+HS,           [facility]

[place]



     Lispro      00   :30                           First dose           Medical



     (HumaLOG) +                                         (after           [facility]



     Fsbg                                         last           



     Testing                                         modificati           



                                                  on) on Thu           



                                                  22 at           



                                                  1200,           



                                                  Until           



                                                  Discontinu           



                                                  ed,            



                                                  Routine           

 

     insulin NPH            Yes            24U       24 Units,           U

nivers



     (HUMULIN N)                                     Subcutaneo           it

y of



     injection      14:00:                               us, QAM,           [place]

[place]



     24 Units      00                                 First dose           Medic

al



                                                  (after           [facility]



                                                  last           



                                                  modificati           



                                                  on) on Thu           



                                                  22 at           



                                                  0900,           



                                                  Until           



                                                  Discontinu           



                                                  ed,            



                                                  Routine           

 

     insulin NPH      2022- No             20U       20 Units,           

[facility]



     (HUMULIN N)                                Subcutaneo           i

ty of



     injection      14:00: 19:31                          us, QAM,           [facility]

as



     20 Units      00   :02                           First dose           Medic

al



                                                  (after           [facility]



                                                  last           



                                                  modificati           



                                                  on) on Thu           



                                                  22 at           



                                                  0900,           



                                                  Until           



                                                  Discontinu           



                                                  ed,            



                                                  Routine           

 

     insulin            Yes            5U        5 Units,           Univer

s



     lispro                                     Subcutaneo           ity of



     (human)      13:45:                               us, TID           [place]



     (HumaLOG      00                                 MEALS,           Medical



     U-100)                                         First dose           [facility]



     injection 5                                         (after           



     Units                                         last           



                                                  modificati           



                                                  on) on Thu           



                                                  22 at           



                                                  0845,           



                                                  Until           



                                                  Discontinu           



                                                  ed,            



                                                  Routine           

 

     apixaban            Yes            5mg       5 mg,           [facility]



     (ELIQUIS)                                     Oral, BID,           ity 

of



     tablet 5 mg      13:00:                               First dose           

[place]



               00                                 on Thu           Medical



                                                  22 at           [facility]



                                                  0800,           



                                                  Until           



                                                  Discontinu           



                                                  ed,            



                                                  Routine<br           



                                                  >Indicatio           



                                                  ns: DVT/PE           

 

     celecoxib            Yes            100mg      100 mg,           Univ

ers



     (CELEBREX)                                     Oral, BID           ity 

of



     capsule 100      13:00:                               MEALS,           [place]

[place]



     mg        00                                 First dose           Medical



                                                  on Thu           [facility]



                                                  22 at           



                                                  0800,           



                                                  Until           



                                                  Discontinu           



                                                  ed,            



                                                  Routine           

 

     gabapentin            Yes            300mg      300 mg,           Uni

vers



     (NEURONTIN)                                     Oral, TID,           it

y of



     capsule 300      13:00:                               First dose           

[place]



     mg        00                                 on [facility]



                                                  22 at           [facility]



                                                  0800,           



                                                  Until           



                                                  Discontinu           



                                                  ed,            



                                                  Routine           

 

     HYDROmorpho            Yes            4mg       4 mg,           Unive

rs



     ne                                       Oral, TID,           ity of



     (DILAUDID)      13:00:                               First dose           T

[place]



     tablet 4 mg      00                                 (after           Medica

l



                                                  last           [facility]



                                                  modificati           



                                                  on) on Thu           



                                                  22 at           



                                                  0800,           



                                                  Until           



                                                  Discontinu           



                                                  ed,            



                                                  Routine           

 

     linezolid      2022- No             600mg      600 mg,           Uni

vers



     (ZYVOX)                                Oral,           ity of



     tablet 600      03:15: 03:49                          Q12H, 1           Eric

as



     mg        00   :00                           dose,           Medical



                                                  First dose           [facility]



                                                  on Wed           



                                                  8/10/22 at           



                                                  2215,           



                                                  ASAP<br>Re           



                                                  ason for           



                                                  Anti-Infec           



                                                  tive:           



                                                  Documented           



                                                  Infection<           



                                                  br>Documen           



                                                  huma            



                                                  Infection           



                                                  Site: Skin           



                                                  / Soft           



                                                  Tissue<br>           



                                                  Duration           



                                                  of             



                                                  Therapy: 7           



                                                  days<br>Re           



                                                  stricted           



                                                  use            



                                                  approved           



                                                  by: AUSTIN SPANGLER,           



                                                  ADULT ID           

 

     heparin      2022- No             5000U      5,000           [facility]



     (porcine)                                Units,           ity of



     injection      01:00: 12:21                          Subcutaneo           T

[place]



     5,000 Units      00   :15                           us, BID,           Medi

[facility]



                                                  First dose           [facility]



                                                  on Wed           



                                                  8/10/22 at           



                                                  2000,           



                                                  Until           



                                                  Discontinu           



                                                  ed,            



                                                  Routine           

 

     insulin            Yes            10U       10 Units,           Unive

rs



     lispro      8-10                               Subcutaneo           ity of



     (human)      22:00:                               us, TID           [place]



     (HumaLOG      00                                 MEALS,           Medical



     U-100)                                         First dose           [facility]



     injection                                         (after           



     10 Units                                         last           



                                                  modificati           



                                                  on) on Wed           



                                                  8/10/22 at           



                                                  1700,           



                                                  Until           



                                                  Discontinu           



                                                  ed,            



                                                  Routine           

 

     insulin NPH            Yes            20U       20 Units,           U

nivers



     (HUMULIN N)      8-10                               Subcutaneo           it

y of



     injection      22:00:                               us, QPM,           [place]

[place]



     20 Units      00                                 First dose           Medic

al



                                                  (after           [facility]



                                                  last           



                                                  modificati           



                                                  on) on Wed           



                                                  8/10/22 at           



                                                  1700,           



                                                  Until           



                                                  Discontinu           



                                                  ed,            



                                                  Routine           

 

     insulin NPH      2022- No             20U       20 Units,           

[facility]



     (HUMULIN N)      8-10 08-11                          Subcutaneo           i

ty of



     injection      22:00: 13:38                          us, QPM,           [facility]

as



     20 Units      00   :17                           First dose           Medic

al



                                                  (after           [facility]



                                                  last           



                                                  modificati           



                                                  on) on Wed           



                                                  8/10/22 at           



                                                  1700,           



                                                  Until           



                                                  Discontinu           



                                                  ed,            



                                                  Routine           

 

     HYDROmorpho            Yes            4mg       4 mg,           Unive

rs



     ne        8-10                               Oral,           ity of



     (DILAUDID)      15:00:                               TIDPRN,           [place]

[place]



     tablet 4 mg      00                                 Starting           Medi

[facility]



                                                  on Wed           [facility]



                                                  8/10/22 at           



                                                  1000,           



                                                  Until           



                                                  Discontinu           



                                                  ed,            



                                                  Routine,           



                                                  Pain           



                                                  (scale           



                                                  7-10)           

 

     HYDROmorpho      2022- No             4mg       4 mg,           Univ

ers



     ne        8-10 08-11                          Oral,           ity of



     (DILAUDID)      15:00: 11:04                          TIDPRN,           Eric

as



     tablet 4 mg      00   :00                           Starting           Medi

[facility]



                                                  on Wed           [facility]



                                                  8/10/22 at           



                                                  1000,           



                                                  Until Thu           



                                                  22 at           



                                                  0604,           



                                                  Routine,           



                                                  Pain           



                                                  (scale           



                                                  7-10)           

 

     insulin NPH      2022- No             20U       20 Units,           

[facility]



     (HUMULIN N)      8-10 08-10                          Subcutaneo           i

ty of



     injection      14:00: 18:34                          us, QAM,           [facility]

as



     20 Units      00   :03                           First dose           Medic

al



                                                  (after           [facility]



                                                  last           



                                                  modificati           



                                                  on) on Wed           



                                                  8/10/22 at           



                                                  0900,           



                                                  Until           



                                                  Discontinu           



                                                  ed,            



                                                  Routine           

 

     Sliding      2022-0      Yes                      Subcutaneo           Univ

ers



     Scale      8-10                               us, TID           ity of



     Insulin -      13:00:                               MEALS+HS,           Eric

as



     Lispro      00                                 First dose           Medical



     (HumaLOG) +                                         (after           [facility]



     Fsbg                                         last           



     Testing                                         modificati           



                                                  on) on Wed           



                                                  8/10/22 at           



                                                  0800,           



                                                  Until           



                                                  Discontinu           



                                                  ed,            



                                                  Routine           

 

     Sliding      2022- No                       Subcutaneo           Uni

vers



     Scale      8-10 08-11                          us, TID           ity of



     Insulin -      13:00: 13:39                          MEALS+HS,           Te

[facility]



     Lispro      00   :23                           First dose           Medical



     (HumaLOG) +                                         (after           [facility]



     Fsbg                                         last           



     Testing                                         modificati           



                                                  on) on Wed           



                                                  8/10/22 at           



                                                  0800,           



                                                  Until           



                                                  Discontinu           



                                                  ed,            



                                                  Routine           

 

     FENTanyl PF      2022- No             25ug      25 mcg,           Un

[place]



     (SUBLIMAZE      8-10 08-10                          Slow IV           ity o

f



     (PF))      03:00: 03:26                          Push,           [place]



     injection      00   :00                           ONCE, 1           Medical



     25 mcg                                         dose, On           Branch



                                                  22           



                                                  at 2200,           



                                                  Routine           

 

     Sliding      2022- No                       Subcutaneo           Uni

vers



     Scale      8-09 08-10                          us, TID           ity of



     Insulin -      22:00: 12:44                          MEALS+HS,           Te

[place]



     Lispro      00   :50                           First dose           Medical



     (HumaLOG) +                                         (after           [facility]



     Fsbg                                         last           



     Testing                                         modificati           



                                                  on) on 22 at           



                                                  1700,           



                                                  Until           



                                                  Discontinu           



                                                  ed,            



                                                  Routine           

 

     insulin NPH      2022- No             30U       30 Units,           

[facility]



     (HUMULIN N)      8-09 08-10                          Subcutaneo           i

ty of



     injection      22:00: 12:44                          us, QPM,           [facility]

as



     30 Units      00   :50                           First dose           Medic

al



                                                  (after           [facility]



                                                  last           



                                                  modificati           



                                                  on) on 22 at           



                                                  1700,           



                                                  Until           



                                                  Discontinu           



                                                  ed,            



                                                  Routine           

 

     FENTanyl PF      2022- No             25ug      25 mcg,           Un

[place]



     (SUBLIMAZE                                Slow IV           ity o

f



     (PF))      17:54: 18:30                          Push,           [place]



     injection      44   :23                           Q5MIN PRN,           Medi

[facility]



     25 mcg                                         4 doses,           [facility]



                                                  Starting           



                                                  on 22 at           



                                                  1254,           



                                                  Until 22 at           



                                                  1330,           



                                                  Routine,           



                                                  Pain           



                                                  (scale           



                                                  7-10),           



                                                  PACU           

 

     ketamine      2022- No                       Intravenou           Un

[facility]



     (KETALAR)                                s, ONCE           ity of



     injection      17:26: 17:56                          INTRA           [place]



               00   :00                           PROCEDURE,           Medical



                                                  Starting           [facility]



                                                  on 22 at           



                                                  1226,           



                                                  Until 22 at           



                                                  1256,           



                                                  Routine,           



                                                  Intra-op           

 

     clindamycin      2022- No                       IV             Unive

rs



     in 5 %                                Piggyback,           ity of



     dextrose      17:18: 17:56                          ONCE INTRA           [place]

[place]



     (CLEOCIN)      00   :00                           PROCEDURE,           Medi

[facility]



     900 mg/50                                         Starting           [facility]



     mL IV                                         on Tue           



     piggyback                                         22 at           



     RTU                                          1218,           



                                                  Until 22 at           



                                                  1256,           



                                                  Administer           



                                                  over 30           



                                                  Minutes,           



                                                  Intra-op           

 

     bupivacaine      2022- No                       PRN,           Unive

rs



     (preserv                                Starting           ity of



     free) 0.5%      17:00: 17:54                          on Tue           [place]

[place]



     (SENSORCAIN      00   :05                           22 at           Med

ical



     E MPF) 0.5                                         1200,           [facility]



     % (5 mg/mL)                                         Intra-op           



     10 mL,                                                        



     lidocaine                                                        



     1% (PF)                                                        



     (XYLOCAINE)                                                        



     10 mL                                                        

 

     phenylephri      2022- No                       Intravenou          

 [facility]



     ne                                  s, ONCE           ity of



     (VAZCULEP)      16:41: 17:56                          INTRA           [place]



     injection      00   :00                           PROCEDURE,           Medi

[facility]



                                                  Starting           [facility]



                                                  on 22 at           



                                                  1141,           



                                                  Until 22 at           



                                                  1256,           



                                                  Routine,           



                                                  Intra-op           

 

     dexmedeTOMI      2022- No                       Intravenou          

 [facility]



     Dine                                s, ONCE           ity of



     (PRECEDEX)      16:31: 17:56                          INTRA           [place]



     injection      00   :00                           PROCEDURE,           Medi

sarah



                                                  Starting           [facility]



                                                  on 22 at           



                                                  1131,           



                                                  Until 22 at           



                                                  1256,           



                                                  Routine,           



                                                  Intra-op           

 

     propofoL IV      2022- No                       IV             Unive

rs



     infusion                                Infusion,           ity o

f



               16:31: 17:56                          CONTINUOUS           [place]



               00   :00                           PRN,           Medical



                                                  Starting           [facility]



                                                  on 22 at           



                                                  1131,           



                                                  Until 22 at           



                                                  1256,           



                                                  Routine,           



                                                  Intra-op           

 

     FENTanyl PF      2022- No                       Epidural,           

[facility]



     (SUBLIMAZE                                ONCE INTRA           it

y of



     (PF))      16:31: 17:56                          PROCEDURE,           [place]



     injection      00   :00                           Starting           Medica

l



                                                  on Tue           [facility]



                                                  22 at           



                                                  1131,           



                                                  Until 22 at           



                                                  1256,           



                                                  Routine,           



                                                  Intra-op           

 

     midazolam      2022-                        IV Push,           Uni

vers



     (VERSED)                                ONCE INTRA           ity 

of



     injection      16:31: 17:56                          PROCEDURE,           [place]

[place]



               00   :00                           Starting           Medical



                                                  on Tue           [facility]



                                                  22 at           



                                                  1131,           



                                                  Until 22 at           



                                                  1256,           



                                                  Routine,           



                                                  Intra-op           

 

     lactated      2022- No                       IV             [facility]



     ringers IV                                Infusion,           ity

 of



     infusion      16:25: 17:56                          CONTINUOUS           [place]

[place]



               00   :00                           PRN,           Medical



                                                  Starting           [facility]



                                                  on 22 at           



                                                  1125,           



                                                  Until 22 at           



                                                  1256,           



                                                  Routine,           



                                                  Intra-op           

 

     insulin NPH      2022- No             34U       34 Units,           

[facility]



     (HUMULIN N)      8-09 08-10                          Subcutaneo           i

ty of



     injection      14:00: 12:44                          us, QAM,           [facility]

as



     34 Units      00   :50                           First dose           Medic

al



                                                  (after           [facility]



                                                  last           



                                                  modificati           



                                                  on) on 22 at           



                                                  0900,           



                                                  Until           



                                                  Discontinu           



                                                  ed,            



                                                  Routine           

 

     insulin       No             14U       14 Units,           Univ

ers



     lispro      8-09 08-10                          Subcutaneo           ity of



     (human)      13:00: 12:44                          us, TID           [place]



     (HumaLOG      00   :50                           MEALS,           Medical



     U-100)                                         First dose           [facility]



     injection                                         (after           



     14 Units                                         last           



                                                  modificati           



                                                  on) on 22 at           



                                                  0800,           



                                                  Until           



                                                  Discontinu           



                                                  ed,            



                                                  Routine           

 

     insulin NPH       No             32U       32 Units,           

[facility]



     (HUMULIN N)                                Subcutaneo           i

ty of



     injection      22:00: 12:53                          us, QPM,           [facility]

as



     32 Units      00   :44                           First dose           Medic

al



                                                  (after           [facility]



                                                  last           



                                                  modificati           



                                                  on) on 22 at           



                                                  1700,           



                                                  Until           



                                                  Discontinu           



                                                  ed,            



                                                  Routine           

 

     HYDROmorpho       No             4mg       4 mg,           Univ

ers



     ne        8-08 08-10                          Oral,           ity of



     (DILAUDID)      16:30: 14:59                          BIDPRN,           [facility]

as



     tablet 4 mg      00   :38                           Starting           Medi

[facility]



                                                  on 22 at           



                                                  1130,           



                                                  Until Wed           



                                                  8/10/22 at           



                                                  0959,           



                                                  Routine,           



                                                  Pain           



                                                  (scale           



                                                  7-10)           

 

     insulin NPH       No             36U       36 Units,           

[facility]



     (HUMULIN N)                                Subcutaneo           i

ty of



     injection      14:00: 12:53                          us, QAM,           [facility]

as



     36 Units      00   :44                           First dose           Medic

al



                                                  (after           [facility]



                                                  last           



                                                  modificati           



                                                  on) on 22 at           



                                                  0900,           



                                                  Until           



                                                  Discontinu           



                                                  ed,            



                                                  Routine           

 

     insulin       No             15U       15 Units,           Univ

ers



     lispro                                Subcutaneo           ity of



     (human)      13:30: 12:53                          us, TID           [place]



     (HumaLOG      00   :44                           MEALS,           Medical



     U-100)                                         First dose           [facility]



     injection                                         (after           



     15 Units                                         last           



                                                  modificati           



                                                  on) on 22 at           



                                                  0830,           



                                                  Until           



                                                  Discontinu           



                                                  ed,            



                                                  Routine           

 

     cholestyram            Yes                      Topical           Uni

vers



     ine-nystati                                     (Apply To           ity

 of



     n-zinc      13:00:                               Affected           [place]



     oxide      00                                 Areas),           Medical



     ointment                                         BID, First           [facility]

h



     1:1:1                                         dose           



     (COMPOUNDED                                         (after           



     )                                            last           



                                                  modificati           



                                                  on) on 22 at           



                                                  0800,           



                                                  Until           



                                                  Discontinu           



                                                  ed,            



                                                  Routine           

 

     cholestyram            Yes                      Topical           Uni

vers



     ine-nystati                                     (Apply To           ity

 of



     n-zinc      13:00:                               Affected           [place]



     oxide      00                                 Areas),           Medical



     ointment                                         BID, First           [facility]

h



     1:1:1                                         dose           



     (COMPOUNDED                                         (after           



     )                                            last           



                                                  modificati           



                                                  on) on 22 at           



                                                  0800,           



                                                  Until           



                                                  Discontinu           



                                                  ed,            



                                                  Routine           

 

     magnesium       No             4g        4 g, IV           Univ

ers



     sulfate in                                Piggyback,           it

y of



     water 4      12:00: 15:57                          ONCE, 1           [place]



     gram/50 mL      00   :00                           dose, On           Medic

al



     (8 %) IV                                         Mon 22           [facility]

h



     Piggyback 4                                         at 0700,           



     g                                            Routine           

 

     eravacyclin       No             1mg/kg      123 mg (1         

  [facility]



     e (XERAVA)                                mg/kg ?123           it

y of



     123 mg in      03:15: 00:10                          kg), IV           [place]

[facility]



     NaCl 0.9%      00   :03                           Infusion,           Medic

al



     (NS) 250 mL                                         Q12H ABX,           Bra

nch



     IV infusion                                         Administer           



                                                  over 60           



                                                  Minutes,           



                                                  First dose           



                                                  on Sun           



                                                  22 at           



                                                  2215, For           



                                                  7 days           

 

     insulin NPH       No             38U       38 Units,           

[facility]



     (HUMULIN N)                                Subcutaneo           i

ty of



     injection      22:00: 13:14                          us, QPM,           [facility]

as



     38 Units      00   :25                           First dose           Medic

al



                                                  (after           [facility]



                                                  last           



                                                  modificati           



                                                  on) on Sun           



                                                  22 at           



                                                  1700,           



                                                  Until           



                                                  Discontinu           



                                                  ed,            



                                                  Routine           

 

     insulin       No             18U       18 Units,           Univ

ers



     lispro                                Subcutaneo           ity of



     (human)      17:00: 13:15                          us, TID           [place]



     (HumaLOG      00   :02                           MEALS,           Medical



     U-100)                                         First dose           [facility]



     injection                                         (after           



     18 Units                                         last           



                                                  modificati           



                                                  on) on Sun           



                                                  22 at           



                                                  1200,           



                                                  Until           



                                                  Discontinu           



                                                  ed,            



                                                  Routine           

 

     insulin NPH       No             37U       37 Units,           

[facility]



     (HUMULIN N)                                Subcutaneo           i

ty of



     injection      14:00: 14:15                          us, QAM,           [facility]

as



     37 Units      00   :14                           First dose           Medic

al



                                                  (after           [facility]



                                                  last           



                                                  modificati           



                                                  on) on Sun           



                                                  22 at           



                                                  0900,           



                                                  Until           



                                                  Discontinu           



                                                  ed,            



                                                  Routine           

 

     Sliding       No                       Subcutaneo           Uni

vers



     Scale                                us, TID           ity of



     Insulin -      02:00: 12:53                          MEALS+HS,           [facility]

[place]



     Lispro      00   :44                           First dose           Medical



     (HumaLOG) +                                         (after           [facility]



     Fsbg                                         last           



     Testing                                         modificati           



                                                  on) on Sat           



                                                  22 at           



                                                  2100,           



                                                  Until           



                                                  Discontinu           



                                                  ed,            



                                                  Routine           

 

     HYDROmorpho       No             2mg       2 mg,           Univ

ers



     ne        8-07 08-08                          Oral,           ity of



     (DILAUDID)      00:17: 16:23                          Q8HPRN,           [facility]

as



     tablet 2 mg      00   :26                           Starting           Medi

[facility]



                                                  on Sat           [facility]



                                                  22 at           



                                                  1917,           



                                                  Until Mon           



                                                  22 at           



                                                  1123,           



                                                  Routine,           



                                                  Pain           



                                                  (scale           



                                                  7-10)           

 

     insulin      2022- No             17U       17 Units,           Univ

ers



     lispro                                Subcutaneo           ity of



     (human)      22:00: 14:15                          us, TID           [place]



     (HumaLOG      00   :14                           MEALS,           Medical



     U-100)                                         First dose           [facility]



     injection                                         (after           



     17 Units                                         last           



                                                  modificati           



                                                  on) on Sat           



                                                  22 at           



                                                  1700,           



                                                  Until           



                                                  Discontinu           



                                                  ed,            



                                                  Routine           

 

     insulin NPH       No             35U       35 Units,           

[facility]



     (HUMULIN N)                                Subcutaneo           i

ty of



     injection      22:00: 13:38                          us, QPM,           Eric

as



     35 Units      00   :53                           First dose           Medic

al



                                                  (after           Branch



                                                  last           



                                                  modificati           



                                                  on) on Sat           



                                                  22 at           



                                                  1700,           



                                                  Until           



                                                  Discontinu           



                                                  ed,            



                                                  Routine           

 

     Sliding                             Subcutaneo           Uni

vers



     Scale      -                          us, Q4H,           ity of



     Insulin -      21:00: 22:06                          First dose           [facility]

[place]



     Lispro      00   :35                           (after           Medical



     (HumaLOG) +                                         last           Branch



     Fsbg                                         modificati           



     Testing                                         on) on Sat           



                                                  22 at           



                                                  1600,           



                                                  Until           



                                                  Discontinu           



                                                  ed,            



                                                  Routine           

 

     HYDROmorpho      2022- No             4mg       4 mg,           Univ

ers



     ne                                  Oral,           ity of



     (DILAUDID)      17:24: 00:17                          Q8HPRN,           Eric

as



     tablet 4 mg      27   :12                           Starting           Medi

sarah



                                                  on Sat           Branch



                                                  22 at           



                                                  1224,           



                                                  Until Sat           



                                                  22 at           



                                                  1917,           



                                                  Routine,           



                                                  Pain           



                                                  (scale           



                                                  7-10)           

 

     sodium      -0      Yes                      Topical,           [facility]



     hypochlorit      8-06                               DAILY,           ity of



     e 0.25%      14:00:                               First dose           [place]

s



     (DAKIN'S      00                                 on Sat           Medical



     SOLUTION)                                         22 at           [facility]

h



     solution                                         0900,           



                                                  Until           



                                                  Discontinu           



                                                  ed, ASAP           

 

     sodium      -0      Yes                      Topical,           [facility]



     hypochlorit      8-06                               DAILY,           ity of



     e 0.25%      14:00:                               First dose           [place]

[place]



     (DAKIN'S      00                                 on Sat           Medical



     SOLUTION)                                         22 at           [facility]

h



     solution                                         0900,           



                                                  Until           



                                                  Discontinu           



                                                  ed, ASAP           

 

     insulin                   12U       12 Units,           Univ

ers



     lispro                                Subcutaneo           ity of



     (human)      14:00: 19:55                          us, TIDPC,           Eric

as



     (HumaLOG      00   :31                           First dose           Medic

al



     U-100)                                         (after           Branch



     injection                                         last           



     12 Units                                         modificati           



                                                  on) on Sat           



                                                  22 at           



                                                  0900,           



                                                  Until           



                                                  Discontinu           



                                                  ed,            



                                                  Routine           

 

     insulin NPH                   26U       26 Units,           

[facility]



     (HUMULIN N)                                Subcutaneo           i

ty of



     injection      14:00: 19:55                          us, QAM,           Eric

as



     26 Units      00   :31                           First dose           Medic

al



                                                  (after           Branch



                                                  last           



                                                  modificati           



                                                  on) on Sat           



                                                  22 at           



                                                  0900,           



                                                  Until           



                                                  Discontinu           



                                                  ed,            



                                                  Routine           

 

     Sliding                             Subcutaneo           Uni

vers



     Scale                                , TID           ity of



     Insulin -      13:00: 19:55                          MEALS+HS,           [facility]

[facility]



     Lispro      00   :31                           First dose           Medical



     (HumaLOG) +                                         on Sat           Branch



     Fsbg                                         22 at           



     Testing                                         0800,           



                                                  Until           



                                                  Discontinu           



                                                  ed,            



                                                  Routine           

 

     glucagon            Yes            1mg       1 mg,           [facility]



     (GLUCAGEN                                     Intramuscu           ity 

of



     DIAGNOSTIC      12:22:                               lar, PRN,           Te

[place]



     KIT)      35                                 Starting           Medical



     injection 1                                         on Sat           Branch



     mg                                           22 at           



                                                  0722,           



                                                  Until           



                                                  Discontinu           



                                                  ed, ASAP,           



                                                  Blood           



                                                  Glucose           



                                                  &amp;lt;           



                                                  or = 70           



                                                  mg/dL and           



                                                  patient is           



                                                  unable to           



                                                  swallow or           



                                                  has mental           



                                                  changes.           

 

     dextrose 50            Yes            25mL      25 mL,           Univ

ers



     % in water                                     Slow IV           ity of



     (D50W)      12:22:                               Push, PRN,           [place]



     injection      35                                 Starting           Medica

l



     25 mL                                         on Sat           Branch



                                                  22 at           



                                                  0722,           



                                                  Until           



                                                  Discontinu           



                                                  ed, ASAP,           



                                                  Blood           



                                                  Glucose           



                                                  &amp;lt;           



                                                  or = 70           



                                                  mg/dL and           



                                                  patient is           



                                                  unable to           



                                                  swallow or           



                                                  has mental           



                                                  status           



                                                  changes.           

 

     glucagon            Yes            1mg       1 mg,           [facility]



     (GLUCAGEN                                     Intramuscu           ity 

of



     DIAGNOSTIC      12:22:                               lar, PRN,           Te

[facility]



     KIT)      35                                 Starting           Medical



     injection 1                                         on Sat           Branch



     mg                                           22 at           



                                                  0722,           



                                                  Until           



                                                  Discontinu           



                                                  ed, ASAP,           



                                                  Blood           



                                                  Glucose           



                                                  &amp;lt;           



                                                  or = 70           



                                                  mg/dL and           



                                                  patient is           



                                                  unable to           



                                                  swallow or           



                                                  has mental           



                                                  changes.           

 

     dextrose 50            Yes            25mL      25 mL,           Univ

ers



     % in water                                     Slow IV           ity of



     (D50W)      12:22:                               Push, PRN,           [place]



     injection      35                                 Starting           Medica

l



     25 mL                                         on Sat           Branch



                                                  22 at           



                                                  0722,           



                                                  Until           



                                                  Discontinu           



                                                  ed, ASAP,           



                                                  Blood           



                                                  Glucose           



                                                  &amp;lt;           



                                                  or = 70           



                                                  mg/dL and           



                                                  patient is           



                                                  unable to           



                                                  swallow or           



                                                  has mental           



                                                  status           



                                                  changes.           

 

     HYDROmorpho                   .2mg      0.2 mg,           Un

[place]



     ne                                  Slow IV           ity of



     (DILAUDID)      01:49: 02:17                          Push, PRN,           

[place]



     injection      34   :00                           1 dose,           Medical



     0.2 mg                                         Starting           Branch



                                                  on Fri           



                                                  22 at           



                                                  204,           



                                                  Until 22 at           



                                                  211,           



                                                  Routine,           



                                                  Pain           



                                                  (scale           



                                                  7-10)<br>U           



                                                  se             



                                                  approved           



                                                  by             



                                                  (Faculty):           



                                                  [facility]           



                                                  [facility]           

 

     KCL 20                   40meq      40 mEq,           Univer

s



     mEq/15 mL                                Oral,           ity of



     solution 40      23:30: 22:58                          ONCE, 1           [facility]

[facility]



     mEq       00   :00                           dose, On           Medical



                                                  Fri 22           Branch



                                                  at 1830,           



                                                  Routine           

 

     Sliding                             Subcutaneo           Uni

vers



     Scale                                us, Q4H,           ity of



     Insulin -      22:15: 12:23                          First dose           [facility]

[facility]



     Lispro      00   :48                           on Fri           Medical



     (HumaLOG) +                                         22 at           [facility]



     Fsbg                                         1715,           



     Testing                                         Until           



                                                  Discontinu           



                                                  ed,            



                                                  Routine           

 

     insulin NPH      2022- No             22U       22 Units,           

[facility]



     (HUMULIN N)                                Subcutaneo           i

ty of



     injection      22:00: 19:55                          us, QPM,           Eric

as



     22 Units      00   :31                           First dose           Medic

al



                                                  (after           Branch



                                                  last           



                                                  modificati           



                                                  on) on 22 at           



                                                  1700,           



                                                  Until           



                                                  Discontinu           



                                                  ed,            



                                                  Routine           

 

     magnesium       No             4g        4 g, IV           Univ

ers



     sulfate in                                Piggyback,           it

y of



     water 4      19:15: 20:22                          ONCE, 1           [place]



     gram/50 mL      00   :00                           dose, On           Medic

al



     (8 %) IV                                         22           Branc

h



     Piggyback 4                                         at 1415,           



     g                                            Routine           

 

     HYDROmorpho      2022- No             .2mg      0.2 mg,           Un

[place]



     ne                                  Slow IV           ity of



     (DILAUDID)      18:00: 18:07                          Push,           [place]



     injection      00   :00                           ONCE, 1           Medical



     0.2 mg                                         dose, On           Branch



                                                  22           



                                                  at 1300,           



                                                  Routine<br           



                                                  >Use           



                                                  approved           



                                                  by             



                                                  (Faculty):           



                                                  [facility]           



                                                  CARE UN[facility]           

 

     insulin      2022- No             6U        6 Units,           Unive

rs



     lispro                                Subcutaneo           ity of



     (human)      14:00: 00:41                          us, TIDPC,           Eric

as



     (HumaLOG      00   :23                           First dose           Medic

al



     U-100)                                         (after           Branch



     injection 6                                         last           



     Units                                         modificati           



                                                  on) on 22 at           



                                                  0900,           



                                                  Until           



                                                  Discontinu           



                                                  ed,            



                                                  Routine           

 

     insulin NPH      2022- No             22U       22 Units,           

[facility]



     (HUMULIN N)                                Subcutaneo           i

ty of



     injection      14:00: 21:37                          us, QAM,           Eric

as



     22 Units      00   :09                           First dose           Medic

al



                                                  on 22 at           



                                                  0900,           



                                                  Until           



                                                  Discontinu           



                                                  ed,            



                                                  Routine           

 

     HYDROmorpho      2022- No             .2mg      0.2 mg,           Un

[place]



     ne                                  Slow IV           ity of



     (DILAUDID)      08:15: 07:57                          Push,           [place]



     injection      00   :00                           ONCE, 1           Medical



     0.2 mg                                         dose, On           Branch



                                                  22           



                                                  at 0315,           



                                                  Routine<br           



                                                  >Use           



                                                  approved           



                                                  by             



                                                  (Faculty):           



                                                  [facility]           



                                                  CARE UNIT           

 

     HYDROcodone      2022- No             1{tbl}      1 tablet,         

  [facility]



     -acetaminop                                Oral,           ity of



     hen (NORCO)      01:32: 17:25                          Q6HPRN,           [facility]

[place]



      mg      23   :31                           Starting           Medica

l



     tablet 1                                         on Thu           Branch



     tablet                                         22 at           



                                                  2032,           



                                                  Until Sat           



                                                  22 at           



                                                  1225,           



                                                  Routine,           



                                                  Pain           



                                                  (scale           



                                                  7-10)           

 

     Sliding                             Subcutaneo           Uni

vers



     Scale                                , Q4H,           ity of



     Insulin -      01:00: 21:37                          First dose           [facility]

[place]



     lispro      00   :09                           on Thu           Medical



     (humaLOG) +                                         22 at           [facility]



     Fsbg                                         ,           



     Testing                                         Until           



                                                  Discontinu           



                                                  ed,            



                                                  Routine           

 

     meropenem      2022- No             1000mg      1,000 mg,           

[facility]



     (MERREM)                                IV             ity of



     1,000 mg in      00:15: 02:14                          [facility],          

 [place]



     NaCl 0.9%      00   :44                           Q8H ABX,           Medica

l



     (NS) 50 mL                                         21 doses,           [facility]

ch



     MINI-BAG                                         First dose           



                                                  on u           



                                                  22 at           



                                                  1915, Last           



                                                  dose on           



                                                  u            



                                                  22 at           



                                                  1115,           



                                                  Administer           



                                                  over 3           



                                                  Hours, 50           



                                                  mL<br>Rest           



                                                  ricted use           



                                                  approved           



                                                  by: POP           



                                                  8TH            



                                                  FLOOR<br>R           



                                                  elmira for           



                                                  Anti-Infec           



                                                  tive:           



                                                  Documented           



                                                  Infection<           



                                                  br>Documen           



                                                  huma            



                                                  Infection           



                                                  Site:           



                                                  Urine<br>D           



                                                  uration of           



                                                  Therapy: 7           



                                                  days           

 

     HYDROmorpho      2022- No             .2mg      0.2 mg,           Un

[facility]



     ne                                  Slow IV           ity of



     (DILAUDID)      00:00: 23:13                          Push,           [place]



     injection      00   :00                           ONCE, 1           Medical



     0.2 mg                                         dose, On           [facility]



                                                  Thu 22           



                                                  at 1900,           



                                                  Routine<br           



                                                  >Use           



                                                  approved           



                                                  by             



                                                  (Faculty):           



                                                  INTENSIVE           



                                                  CARE UNIT           

 

     HYDROmorpho      2022- No             .2mg      0.2 mg,           Un

[facility]



     ne                                  Slow IV           ity of



     (DILAUDID)      19:30: 18:59                          Push,           [place]



     injection      00   :00                           ONCE, 1           Medical



     0.2 mg                                         dose, On           [facility]



                                                  Thu 22           



                                                  at 1430,           



                                                  Routine<br           



                                                  >Use           



                                                  approved           



                                                  by             



                                                  (Faculty):           



                                                  INTENSIVE           



                                                  CARE UNIT           

 

     cholestyram      2022- No                       Topical           Un

[facility]



     ine-nystati                                (Apply To           it

y of



     n-zinc      18:29: 12:46                          Affected           [place]



     oxide      12   :35                           Areas),           Medical



     ointment                                         PRN,           [facility]anch



     1:1:1                                         Starting           



     (COMPOUNDED                                         on Thu           



     )                                            22 at           



                                                  1329,           



                                                  Until Mon           



                                                  22 at           



                                                  0746,           



                                                  Routine,           



                                                  Wound           



                                                  care,           



                                                  Cuts,           



                                                  abrasions,           



                                                  and skin           



                                                  ulcers           

 

     iopamidol      0 202- No        [id number] 60mL      60 mL,           

[facility]



     (ISOVUE                                Intravenou           ity o

f



     370-500 mL)      17:25: 05:25                          s, ONCE, 1          

 [place]



     injection      00   :00                           dose, On           Medica

l



     60 mL                                         Thu 22           [facility]



                                                  at 1230,           



                                                  Routine           

 

     meropenem      2022- No             1000mg      1,000 mg,           

[facility]



     (MERREM)                                IV             ity of



     1,000 mg in      16:45: 20:14                          Piggyback,          

 [place]



     NaCl 0.9%      00   :00                           ONCE, 1           Medical



     (NS) 50 mL                                         dose, On           Branc

h



     MINI-BAG                                         Thu 22           



                                                  at 1145,           



                                                  Administer           



                                                  over 30           



                                                  Minutes,           



                                                  50             



                                                  mL<br&gt;R           



                                                  estricted           



                                                  use            



                                                  approved           



                                                  by: POP           



                                                  8TH            



                                                  FLOOR<br>R           



                                                  elmira for           



                                                  Anti-Infec           



                                                  tive:           



                                                  Documented           



                                                  Infection<           



                                                  br>Documen           



                                                  huma            



                                                  Infection           



                                                  Site:           



                                                  Urine<br>D           



                                                  uration of           



                                                  Therapy: 7           



                                                  days           

 

     pantoprazol      0      Yes            40mg      40 mg,           Univ

ers



     e         8                               Oral,           ity of



     (PROTONIX)      14:00:                               DAILY,           [place]



     EC tablet      00                                 First dose           Medi

sarah



     40 mg                                         on Thu           [facility]



                                                  22 at           



                                                  0900,           



                                                  Until           



                                                  Discontinu           



                                                  ed,            



                                                  Routine<br           



                                                  >Indicatio           



                                                  n for use:           



                                                  None of           



                                                  the above           

 

     pantoprazol      -0      Yes            40mg      40 mg,           Univ

ers



     e         8                               Oral,           ity of



     (PROTONIX)      14:00:                               DAILY,           [place]



     EC tablet      00                                 First dose           Medi

sarah



     40 mg                                         on Thu           [facility]



                                                  22 at           



                                                  0900,           



                                                  Until           



                                                  Discontinu           



                                                  ed,            



                                                  Routine<br           



                                                  >Indicatio           



                                                  n for use:           



                                                  None of           



                                                  the above           

 

     heparin      -0      Yes            5000U      5,000           [facility]



     (porcine)                                     Units,           ity of



     injection      13:00:                               Subcutaneo           [facility]

[place]



     5,000 Units      00                                 us, Q12H,           Med

ical



                                                  First dose           [facility]



                                                  on u           



                                                  22 at           



                                                  0800,           



                                                  Until           



                                                  Discontinu           



                                                  ed,            



                                                  Routine           

 

     heparin      2022- No             5000U      5,000           [facility]



     (porcine)       08-10                          Units,           ity of



     injection      13:00: 21:57                          Subcutaneo           [facility]

[place]



     5,000 Units      00   :33                           us, Q12H,           Med

ical



                                                  First dose           [facility]



                                                  on u           



                                                  22 at           



                                                  0800,           



                                                  Until           



                                                  Discontinu           



                                                  ed,            



                                                  Routine           

 

     NaCl 0.9%      0      Yes            10mL      10 mL,           Univer

s



     (NS)                                     Slow IV           ity of



     injection      11:28:                               Push, PRN,           Te

[place]



     10 mL      11                                 Starting           Medical



                                                  on Thu           [facility]



                                                  22 at           



                                                  0628,           



                                                  Until           



                                                  Discontinu           



                                                  ed,            



                                                  Routine,           



                                                  line           



                                                  maintenanc           



                                                  e              

 

     lidocaine            Yes            5mL       5 mL,           [facility]



     1% (PF)                                     Subcutaneo           ity of



     (XYLOCAINE)      11:28:                               us, PRN,           Te

[place]



     injection 5      11                                 Starting           Medi

sarah



     mL                                           on Thu           [facility]



                                                  22 at           



                                                  0628,           



                                                  Until           



                                                  Discontinu           



                                                  ed,            



                                                  Routine,           



                                                  Local           



                                                  anesthesia           

 

     NaCl 0.9%      0      Yes            10mL      10 mL,           Univer

s



     (NS)                                     Slow IV           ity of



     injection      11:28:                               Push, PRN,           Te

[place]



     10 mL      11                                 Starting           Medical



                                                  on u           [facility]



                                                  22 at           



                                                  0628,           



                                                  Until           



                                                  Discontinu           



                                                  ed,            



                                                  Routine,           



                                                  line           



                                                  maintenanc           



                                                  e              

 

     lidocaine      -0      Yes            5mL       5 mL,           [facility]



     1% (PF)                                     Subcutaneo           ity of



     (XYLOCAINE)      11:28:                               us, PRN,           Te

[place]



     injection 5      11                                 Starting           Medi

sarah



     mL                                           on u           [facility]



                                                  22 at           



                                                  0628,           



                                                  Until           



                                                  Discontinu           



                                                  ed,            



                                                  Routine,           



                                                  Local           



                                                  anesthesia           

 

     NaCl 0.9%      2022- No             1000mL      at 999           Uni

vers



     (NS) bolus                                mL/hr,           ity of



     infusion      07:30: 06:55                          1,000 mL,           Eric

as



     1,000 mL      00   :00                           IV             Medical



                                                  Infusion,           [facility]



                                                  ONCE, 1           



                                                  dose, On           



                                                  u 22           



                                                  at 0230,           



                                                  ASAP           

 

     FENTanyl PF      0 - No             50ug      50 mcg,           Un

[facility]



     (SUBLIMAZE                                Slow IV           ity o

f



     (PF))      06:45: 06:01                          Push,           [place]



     injection      00   :00                           ONCE, 1           Medical



     50 mcg                                         dose, On           [facility]



                                                  u 22           



                                                  at 0145,           



                                                  Routine           

 

     cefTRIAXone      2022- No             1000mg      1,000 mg,         

  [facility]



     (ROCEPHIN)                                IV             ity of



     1,000 mg in      05:30: 06:00                          [place], [place]



     NaCl 0.9%      00   :00                           ONCE, 1           Medical



     (NS) 50 mL                                         dose, On           [facility]

h



     MINI-BAG                                         u 22           



                                                  at 0030,           



                                                  Administer           



                                                  over 30           



                                                  Minutes,           



                                                  50             



                                                  mL<br&gt;R           



                                                  elmira for           



                                                  Anti-Infec           



                                                  tive:           



                                                  Empiric           



                                                  Therapy           



                                                  for            



                                                  Suspected           



                                                  Infection<           



                                                  br>Empiric           



                                                  Therapy           



                                                  Site:           



                                                  Urine<br>D           



                                                  uration of           



                                                  therapy:           



                                                  72 hours           

 

     NaCl 0.9%      2022- No             1000mL      at 999           Uni

vers



     (NS) bolus                                mL/hr,           ity of



     infusion      05:30: 07:58                          1,000 mL,           Eric

as



     1,000 mL      00   :00                           IV             Medical



                                                  Infusion,           [facility]



                                                  ONCE, 1           



                                                  dose, On           



                                                  u 22           



                                                  at 0030,           



                                                  ASAP           

 

     NaCl 0.9%      2022- No             1000mL      at 999           Uni

vers



     (NS) bolus                                mL/hr,           ity of



     infusion      05:15: 07:58                          1,000 mL,           [facility]

as



     1,000 mL      00   :00                           IV             Medical



                                                  Infusion,           [facility]



                                                  ONCE, 1           



                                                  dose, On           



                                                  u 22           



                                                  at 0015,           



                                                  ASAP           

 

     D5W 0.45%      2022- No                       IV             [facility]



     NaCl                                Infusion,           ity of



     (1/2NS) 1 L      04:53: 22:22                          at 200           Eric

as



     + KCL 20      49   :19                           mL/hr, PRN           Medic

al



     mEq                                          - SEE           [facility]



                                                  INSTRUCTIO           



                                                  NS,            



                                                  Starting           



                                                  on Wed           



                                                  8/3/22 at           



                                                  2353,           



                                                  Until 22 at           



                                                  1722,           



                                                  ASAP,           



                                                  Blood           



                                                  glucose           



                                                  control           

 

     proMETHazin      2022- No             25mg      25 mg, IV           

[facility]



     e                                   Piggyback,           ity of



     (PHENERGAN)      04:15: 04:22                          ONCE, 1           Te

[place]



     25 mg in      00   :00                           dose, On           Medical



     NaCl 0.9%                                         Wed 8/3/22           B[facility]

ch



     (NS) 50 mL                                         at 2315,           



     IV                                           ASAP           



     piggyback                                                        

 

     proMETHazin      2022- No             12.5mg      12.5 mg,          

 [facility]



     e          07-30                          IV             ity of



     (PHENERGAN)      21:30: 22:14                          Piggyback,          

 [place]



     12.5 mg in      00   :00                           ONCE, 1           Medica

l



     NaCl 0.9%                                         dose, On           [facility]anch



     (NS) 50 mL                                         Sat            



     IV                                           22 at           



     piggyback                                         1630, 50           



                                                  mL             

 

     lactated            Yes            1000mL      at 50           Univer

s



     ringers IV      7-30                               mL/hr,           ity of



     infusion      19:15:                               1,000 mL,           [place]

[place]



     1,000 mL      00                                 IV             Medical



                                                  Infusion,           [facility]



                                                  CONTINUOUS           



                                                  , Starting           



                                                  on Sat           



                                                  22 at           



                                                  1415,           



                                                  Until           



                                                  Discontinu           



                                                  ed,            



                                                  Routine           

 

     insulin            Yes            7U        7 Units,           [facility]

s



     lispro      7-30                               Subcutaneo           ity of



     (human)      17:00:                               us, TID           [place]



     (HumaLOG      00                                 MEALS,           Medical



     U-100)                                         First dose           [facility]



     injection 7                                         (after           



     Units                                         last           



                                                  modificati           



                                                  on) on Sat           



                                                  22 at           



                                                  1200,           



                                                  Until           



                                                  Discontinu           



                                                  ed,            



                                                  Routine           

 

     insulin            Yes            40U       40 Units,           Unive

rs



     glargine      7-30                               Subcutaneo           ity o

f



     (LANTUS      14:00:                               us, DAILY,           [place]

[facility]



     U-100)      00                                 First dose           Medical



     injection                                         (after           [facility]



     40 Units                                         last           



                                                  modificati           



                                                  on) on Sat           



                                                  22 at           



                                                  0900,           



                                                  Until           



                                                  Discontinu           



                                                  ed,            



                                                  Routine           

 

     insulin NPH      2022- No        [id number] 50U       inject 50      

     [facility]



     and regular      30 08-30                          Units           ity of



     human 70-30      00:00: 04:59                          under the           

[place]



     100 unit/mL      00   :00                           skin every           Me

dical



     (70-30)                                         morning           [facility]



     injection                                         and            



                                                  evening           



                                                  for 30           



                                                  days.           

 

     proMETHazin      2022- No        [id number] 12.5mg      Insert 1     

      [facility]



     e 12.5 mg       0830                          Suppositor           ity

 of



     suppository      00:00: 04:59                          y into           [facility]

as



               00   :00                           rectum           Medical



                                                  every 6           [facility]



                                                  (six)           



                                                  hours as           



                                                  needed for           



                                                  Nausea and           



                                                  Vomiting           



                                                  (N/V) for           



                                                  up to 30           



                                                  days.           

 

     proMETHazin      2022- No        [id number] 12.5mg      Take 0.5     

      [facility]



     e 25 mg      7-30 08-30                          tablets by           ity o

f



     tablet      00:00: 04:59                          mouth           [place]



               00   :00                           every 4           Medical



                                                  (four)           [facility]



                                                  hours as           



                                                  needed for           



                                                  Nausea and           



                                                  Vomiting           



                                                  (N/V) for           



                                                  up to 30           



                                                  days.           

 

     insulin NPH      2022- No        [id number] 50U       inject 50      

     [facility]



     and regular      7-30 08-30                          Units           ity of



     human 70-30      00:00: 04:59                          under the           

[place]



     100 unit/mL      00   :00                           skin every           Me

dical



     (70-30)                                         morning           [facility]



     injection                                         and            



                                                  evening           



                                                  for 30           



                                                  days.           

 

     proMETHazin      2022- No        [id number] 12.5mg      Insert 1     

      [facility]



     e 12.5 mg      7-30 08-30                          Suppositor           ity

 of



     suppository      00:00: 04:59                          y into           [facility]

as



               00   :00                           rectum           Medical



                                                  every 6           [facility]



                                                  (six)           



                                                  hours as           



                                                  needed for           



                                                  Nausea and           



                                                  Vomiting           



                                                  (N/V) for           



                                                  up to 30           



                                                  days.           

 

     proMETHazin      2022- No        [id number] 12.5mg      Take 0.5     

      [facility]



     e 25 mg      7-30 08-30                          tablets by           ity o

f



     tablet      00:00: 04:59                          mouth           [place]



               00   :00                           every 4           Medical



                                                  (four)           [facility]



                                                  hours as           



                                                  needed for           



                                                  Nausea and           



                                                  Vomiting           



                                                  (N/V) for           



                                                  up to 30           



                                                  days.           

 

     insulin NPH      2022- No        [id number] 50U       inject 50      

     [facility]



     and regular      7-30 08-30                          Units           ity of



     human 70-30      00:00: 04:59                          under the           

[place]



     100 unit/mL      00   :00                           skin every           Me

dical



     (70-30)                                         morning           [facility]



     injection                                         and            



                                                  evening           



                                                  for 30           



                                                  days.           

 

     proMETHazin      2022- No        [id number] 12.5mg      Insert 1     

      [facility]



     e 12.5 mg      7-30 08-30                          Suppositor           ity

 of



     suppository      00:00: 04:59                          y into           [facility]

as



               00   :00                           rectum           Medical



                                                  every 6           [facility]



                                                  (six)           



                                                  hours as           



                                                  needed for           



                                                  Nausea and           



                                                  Vomiting           



                                                  (N/V) for           



                                                  up to 30           



                                                  days.           

 

     proMETHazin      2022- No        [id number] 12.5mg      Take 0.5     

      [facility]



     e 25 mg      7-30 08-30                          tablets by           ity o

f



     tablet      00:00: 04:59                          mouth           [place]



               00   :00                           every 4           Medical



                                                  (four)           [facility]



                                                  hours as           



                                                  needed for           



                                                  Nausea and           



                                                  Vomiting           



                                                  (N/V) for           



                                                  up to 30           



                                                  days.           

 

     insulin NPH      2022- No        [id number] 50U       inject 50      

     [facility]



     and regular      7-30 08-30                          Units           ity of



     human 70-30      00:00: 04:59                          under the           

[place]



     100 unit/mL      00   :00                           skin every           Me

dical



     (70-30)                                         morning           [facility]



     injection                                         and            



                                                  evening           



                                                  for 30           



                                                  days.           

 

     proMETHazin      2022- No        [id number] 12.5mg      Insert 1     

      [facility]



     e 12.5 mg      7-30 08-30                          Suppositor           ity

 of



     suppository      00:00: 04:59                          y into           [facility]

as



               00   :00                           rectum           Medical



                                                  every 6           [facility]



                                                  (six)           



                                                  hours as           



                                                  needed for           



                                                  Nausea and           



                                                  Vomiting           



                                                  (N/V) for           



                                                  up to 30           



                                                  days.           

 

     proMETHazin      2022- No        [id number] 12.5mg      Take 0.5     

      [facility]



     e 25 mg      7-30 08-30                          tablets by           ity o

f



     tablet      00:00: 04:59                          mouth           [place]



               00   :00                           every 4           Medical



                                                  (four)           [facility]



                                                  hours as           



                                                  needed for           



                                                  Nausea and           



                                                  Vomiting           



                                                  (N/V) for           



                                                  up to 30           



                                                  days.           

 

     insulin NPH      2022- No        [id number] 50U       inject 50      

     [facility]



     and regular      7-30 08-30                          Units           ity of



     human 70-30      00:00: 04:59                          under the           

[place]



     100 unit/mL      00   :00                           skin every           Me

dical



     (70-30)                                         morning           [facility]



     injection                                         and            



                                                  evening           



                                                  for 30           



                                                  days.           

 

     proMETHazin      2022- No        [id number] 12.5mg      Insert 1     

      [facility]



     e 12.5 mg      7-30 08-30                          Suppositor           ity

 of



     suppository      00:00: 04:59                          y into           [facility]

as



               00   :00                           rectum           Medical



                                                  every 6           [facility]



                                                  (six)           



                                                  hours as           



                                                  needed for           



                                                  Nausea and           



                                                  Vomiting           



                                                  (N/V) for           



                                                  up to 30           



                                                  days.           

 

     proMETHazin      2022- No        [id number] 12.5mg      Take 0.5     

      [facility]



     e 25 mg      7-30 08-30                          tablets by           ity o

f



     tablet      00:00: 04:59                          mouth           [place]



               00   :00                           every 4           Medical



                                                  (four)           [facility]



                                                  hours as           



                                                  needed for           



                                                  Nausea and           



                                                  Vomiting           



                                                  (N/V) for           



                                                  up to 30           



                                                  days.           

 

     proMETHazin      0 - No        [id number] 12.5mg      Insert 1     

      [facility]



     e 12.5 mg      7-30 08-30                          Suppositor           ity

 of



     suppository      00:00: 04:59                          y into           [facility]

as



               00   :00                           rectum           Medical



                                                  every 6           [facility]



                                                  (six)           



                                                  hours as           



                                                  needed for           



                                                  Nausea and           



                                                  Vomiting           



                                                  (N/V) for           



                                                  up to 30           



                                                  days.           

 

     proMETHazin      2022- No        [id number] 12.5mg      Take 0.5     

      [facility]



     e 25 mg      7-30 08-30                          tablets by           ity o

f



     tablet      00:00: 04:59                          mouth           [place]



               00   :00                           every 4           Medical



                                                  (four)           [facility]



                                                  hours as           



                                                  needed for           



                                                  Nausea and           



                                                  Vomiting           



                                                  (N/V) for           



                                                  up to 30           



                                                  days.           

 

     proMETHazin      2022- No        [id number] 12.5mg      Insert 1     

      [facility]



     e 12.5 mg      7-30 08-30                          Suppositor           ity

 of



     suppository      00:00: 04:59                          y into           [facility]

as



               00   :00                           rectum           Medical



                                                  every 6           [facility]



                                                  (six)           



                                                  hours as           



                                                  needed for           



                                                  Nausea and           



                                                  Vomiting           



                                                  (N/V) for           



                                                  up to 30           



                                                  days.           

 

     proMETHazin      2022- No        [id number] 12.5mg      Take 0.5     

      [facility]



     e 25 mg      7-30 08-30                          tablets by           ity o

f



     tablet      00:00: 04:59                          mouth           [place]



               00   :00                           every 4           Medical



                                                  (four)           [facility]



                                                  hours as           



                                                  needed for           



                                                  Nausea and           



                                                  Vomiting           



                                                  (N/V) for           



                                                  up to 30           



                                                  days.           

 

     proMETHazin      2022- No        [id number] 12.5mg      Insert 1     

      [facility]



     e 12.5 mg      7-30 08-30                          Suppositor           ity

 of



     suppository      00:00: 04:59                          y into           [facility]

as



               00   :00                           rectum           Medical



                                                  every 6           [facility]



                                                  (six)           



                                                  hours as           



                                                  needed for           



                                                  Nausea and           



                                                  Vomiting           



                                                  (N/V) for           



                                                  up to 30           



                                                  days.           

 

     proMETHazin      -2022- No        [id number] 12.5mg      Take 0.5     

      [facility]



     e 25 mg      7-30 08-30                          tablets by           ity o

f



     tablet      00:00: 04:59                          mouth           [place]



               00   :00                           every 4           Medical



                                                  (four)           [facility]



                                                  hours as           



                                                  needed for           



                                                  Nausea and           



                                                  Vomiting           



                                                  (N/V) for           



                                                  up to 30           



                                                  days.           

 

     proMETHazin      2022- No        [id number] 12.5mg      Insert 1     

      [facility]



     e 12.5 mg                                Suppositor           ity

 of



     suppository      00:00: 04:59                          y into           [facility]

as



               00   :00                           rectum           Medical



                                                  every 6           [facility]



                                                  (six)           



                                                  hours as           



                                                  needed for           



                                                  Nausea and           



                                                  Vomiting           



                                                  (N/V) for           



                                                  up to 30           



                                                  days.           

 

     proMETHazin      2022- No        [id number] 12.5mg      Take 0.5     

      [facility]



     e 25 mg      30                          tablets by           ity o

f



     tablet      00:00: 04:59                          mouth           [place]



               00   :00                           every 4           Medical



                                                  (four)           [facility]



                                                  hours as           



                                                  needed for           



                                                  Nausea and           



                                                  Vomiting           



                                                  (N/V) for           



                                                  up to 30           



                                                  days.           

 

     insulin NPH      2022- No        [id number] 50U       inject 50      

     [facility]



     and regular       08-12                          Units           ity of



     human 70-30      00:00: 00:00                          under the           

[place]



     100 unit/mL      00   :00                           skin every           Me

dical



     (70-30)                                         morning           [facility]



     injection                                         and            



                                                  evening           



                                                  for 30           



                                                  days.           

 

     insulin      2022- No             5U        5 Units,           Unive

rs



     lispro                                Subcutaneo           ity of



     (human)      17:00: 14:45                          us, TID           [place]



     (HumaLOG      00   :04                           MEALS,           Medical



     U-100)                                         First dose           [facility]



     injection 5                                         (after           



     Units                                         last           



                                                  modificati           



                                                  on) on 22 at           



                                                  1200,           



                                                  Until           



                                                  Discontinu           



                                                  ed,            



                                                  Routine           

 

     HYDROmorphO      2022- No             1mg       1 mg,           Univ

ers



     ne                                  Intravenou           ity of



     (DILAUDID)      16:54: 16:53                          s, Q6HPRN,           

[place]



     injection 1      28   :28                           Starting           Medi

sarah



     mg                                           on Fri           Branch



                                                  22 at           



                                                  1154,           



                                                  Until Sun           



                                                  22 at           



                                                  1153,           



                                                  Routine,           



                                                  Breakthrou           



                                                  gh pain           



                                                  only<br>Us           



                                                  e approved           



                                                  by             



                                                  (Faculty):           



                                                  ADC            



                                                  PROVIDER           

 

     insulin      2022- No             35U       35 Units,           Univ

ers



     glargine                                Subcutaneo           ity 

of



     (LANTUS      14:00: 14:02                          us, DAILY,           Eric

as



     U-100)      00   :21                           First dose           Medical



     injection                                         (after           Branch



     35 Units                                         last           



                                                  modificati           



                                                  on) on 22 at           



                                                  0900,           



                                                  Until           



                                                  Discontinu           



                                                  ed,            



                                                  Routine           

 

     NaCl 0.9%            Yes            10mL      10 mL,           Univer

s



     (NS)                                     Slow IV           ity of



     injection      01:04:                               Push, PRN,           [place]

[place]



     10 mL      34                                 Starting           Medical



                                                  on u           Branch



                                                  22 at           



                                                  2004,           



                                                  Until           



                                                  Discontinu           



                                                  ed,            



                                                  Routine,           



                                                  line           



                                                  maintenanc           



                                                  e              

 

     lidocaine      0      Yes            5mL       5 mL,           [facility]



     1% (PF)                                     Subcutaneo           ity of



     (XYLOCAINE)      01:04:                               us, PRN,           [facility]

[place]



     injection 5      34                                 Starting           Medi

sar[facility]



     mL                                           on u           Branch



                                                  22 at           



                                                  2004,           



                                                  Until           



                                                  Discontinu           



                                                  ed,            



                                                  Routine,           



                                                  Local           



                                                  anesthesia           

 

     nystatin            Yes                      Topical,           Unive

rs



     (NYSTOP)                                     BID, First           ity o

f



     powder      01:00:                               dose on           [place]



               00                                 Thu            Medical



                                                  22 at           Branch



                                                  2000,           



                                                  Until           



                                                  Discontinu           



                                                  ed,            



                                                  Routine           

 

     Sliding            Yes                      Subcutaneo           Univ

ers



     Scale                                     us, TID           ity of



     Insulin -      22:00:                               MEALS+HS,           Eric

as



     Lispro      00                                 First dose           Medical



     (HumaLOG) +                                         (after           Branch



     Fsbg                                         last           



     Testing                                         modificati           



                                                  on) on Thu           



                                                  22 at           



                                                  1700,           



                                                  Until           



                                                  Discontinu           



                                                  ed,            



                                                  Routine           

 

     insulin      0 - No             3U        3 Units,           Unive

rs



     lispro                                Subcutaneo           ity of



     (human)      22:00: 14:02                          , TID           [place]



     (HumaLOG      00   :21                           MEALS,           Medical



     U-100)                                         First dose           Branch



     injection 3                                         on Thu           



     Units                                         22 at           



                                                  1700,           



                                                  Until           



                                                  Discontinu           



                                                  ed,            



                                                  Routine           

 

     mupirocin            Yes                                     [facility]



     (BACTROBAN                                                    ity of



     OINT) 2 %      21:45:                                              [place]



     skin      00                                                Medical



     ointment                                                        Branch

 

     collagenase            Yes                      Topical           Uni

vers



     (SANTYL)                                     (Apply To           ity of



     ointment      21:45:                               Affected           [place]



               00                                 Areas),           Medical



                                                  DAILY,           Branch



                                                  First dose           



                                                  (after           



                                                  last           



                                                  modificati           



                                                  on) on Thu           



                                                  22 at           



                                                  1645,           



                                                  Until           



                                                  Discontinu           



                                                  ed,            



                                                  Routine           

 

     HYDROcodone      0      Yes            1{tbl}      1 tablet,          

 [facility]



     -acetaminop                                     Oral,           ity of



     hen (NORCO      19:54:                               Q6HPRN,           [place]

[place]



     5) 5-325 mg      07                                 Starting           Medi

[facility]



     tablet 1                                         on u           Branch



     tablet                                         22 at           



                                                  1454,           



                                                  Until           



                                                  Discontinu           



                                                  ed,            



                                                  Routine,           



                                                  Pain           



                                                  (scale           



                                                  4-6)           

 

     HYDROcodone            Yes            1{tbl}      1 tablet,          

 [facility]



     -acetaminop                                     Oral,           ity of



     hen (NORCO)      19:47:                               Q6HPRN,           Eric

as



      mg      33                                 Starting           Medica

l



     tablet 1                                         on u           Branch



     tablet                                         22 at           



                                                  1447,           



                                                  Until           



                                                  Discontinu           



                                                  ed,            



                                                  Routine,           



                                                  Pain           



                                                  (scale           



                                                  7-10)           

 

     lactated      2022- No             1000mL      at 100           Univ

ers



     ringers IV       07-30                          mL/hr,           ity of



     infusion      18:30: 19:10                          1,000 mL,           Eric

as



     1,000 mL      00   :52                           IV             Medical



                                                  Infusion,           Branch



                                                  CONTINUOUS           



                                                  , Starting           



                                                  on u           



                                                  22 at           



                                                  1330,           



                                                  Until Sat           



                                                  22 at           



                                                  1410,           



                                                  Routine           

 

     fluconazole      2022- No             200mg      200 mg,           U

nivers



     (DIFLUCAN)                                Oral,           ity of



     tablet 200      14:00: 19:47                          DAILY,           [place]

[place]



     mg        00   :17                           First dose           Medical



                                                  on u           Branch



                                                  22 at           



                                                  0900,           



                                                  Until           



                                                  Discontinu           



                                                  ed,            



                                                  ASAP<br>Re           



                                                  ason for           



                                                  Anti-Infec           



                                                  tive:           



                                                  Empiric           



                                                  Therapy           



                                                  for            



                                                  Suspected           



                                                  Infection<           



                                                  br>Empiric           



                                                  Therapy           



                                                  Site:           



                                                  Urine<br&g           



                                                  t;Duration           



                                                  of             



                                                  therapy:           



                                                  72 hours           

 

     NaCl 0.45%      2022- No             1000mL      at 150           Un

[facility]



     (1/2NS) IV                                mL/hr,           ity of



     infusion      02:15: 13:47                          1,000 mL,           Eric

as



     1,000 mL      00   :19                           IV             Medical



                                                  Infusion,           Branch



                                                  CONTINUOUS           



                                                  , Starting           



                                                  on 22 at           



                                                  2115,           



                                                  Until Thu           



                                                  22 at           



                                                  0847,           



                                                  Routine           

 

     proMETHazin       No             12.5mg      12.5 mg,          

 [facility]



     e                                   IV             ity of



     (PHENERGAN)      02:15: 01:35                          Piggyback,          

 [place]



     12.5 mg in      00   :00                           ONCE, 1           Medica

l



     NaCl 0.9%                                         dose, On           Branch



     (NS) 50 mL                                         Wed            



     IV                                           22 at           



     piggyback                                         ,           



                                                  Routine           

 

     insulin      2022- No             10U       10 Units,           Univ

ers



     lispro                                Subcutaneo           ity of



     (human)      01:30: 00:44                          us, ONCE,           [place]

[place]



     (HumaLOG      00   :00                           1 dose, On           Medic

al



     U-100)                                         Wed            Branch



     injection                                         22 at           



     10 Units                                         , ASAP           

 

     ertapenem      2022- No             1000mg      1,000 mg,           

[facility]



     (INVANZ)                                Intramuscu           ity 

of



     injection      01:30: 18:10                          lar, Q24H           Te

[place]



     1,000 mg      00   :43                           ABX, First           Medic

al



                                                  dose on           Branch



                                                  Wed            



                                                  22 at           



                                                  2030,           



                                                  Until           



                                                  Discontinu           



                                                  ed,            



                                                  ASAP<br&gt           



                                                  ;Reason           



                                                  for            



                                                  Anti-Infec           



                                                  tive:           



                                                  Empiric           



                                                  Therapy           



                                                  for            



                                                  Suspected           



                                                  Infection<           



                                                  br>Empiric           



                                                  Therapy           



                                                  Site:           



                                                  Urine<br>D           



                                                  uration of           



                                                  therapy:           



                                                  72             



                                                  hours<br>R           



                                                  estricted           



                                                  use            



                                                  approved           



                                                  by:            



                                                  History of           



                                                  ESBL           



                                                  infection           



                                                  in past 3           



                                                  months           

 

     HYDROmorphO      2022- No             1mg       1 mg,           Univ

ers



     ne                                  Intravenou           ity of



     (DILAUDID)      01:01: 19:45                          s, Q4HPRN,           

[place]



     injection 1      21   :38                           Starting           Medi

[facility]



     mg                                           on Wed           Branch



                                                  22 at           



                                                  2001,           



                                                  Until Thu           



                                                  22 at           



                                                  1445,           



                                                  Routine,           



                                                  Pain           



                                                  (scale           



                                                  7-10)<br>U           



                                                  se             



                                                  approved           



                                                  by             



                                                  (Faculty):           



                                                  [facility]            



                                                  PROVIDER           

 

     Sliding      2022- No                       Subcutaneo           Uni

vers



     Scale                                us, Q3H,           ity of



     Insulin -      01:00: 18:35                          First dose           [facility]

[place]



     Lispro      00   :13                           (after           Medical



     (HumaLOG) +                                         last           Branch



     Fsbg                                         modificati           



     Testing                                         on) on 22 at           



                                                  2000,           



                                                  Until           



                                                  Discontinu           



                                                  ed,            



                                                  Routine           

 

     pantoprazol            Yes            40mg      40 mg,           Univ

ers



     e                                        Oral,           ity of



     (PROTONIX)      00:30:                               DAILY,           [place]



     EC tablet      00                                 First dose           Medi

sarah



     40 mg                                         on Wed           Branch



                                                  22 at           



                                                  1930,           



                                                  Until           



                                                  Discontinu           



                                                  ed,            



                                                  Routine           

 

     FENTanyl PF      2022- No             50ug      50 mcg,           Un

[place]



     (SUBLIMAZE                                Intramuscu           it

y of



     (PF))      22:51: 01:01                          lar,           [place]



     injection      14   :59                           Q4HPRN,           Medical



     50 mcg                                         Starting           Branch



                                                  on 22 at           



                                                  1751,           



                                                  Until 22 at           



                                                  2001,           



                                                  Routine,           



                                                  Pain           



                                                  (scale           



                                                  7-10)           

 

     Sliding      2022- No                       Subcutaneo           Uni

vers



     Scale                                us, Q3H,           ity of



     Insulin -      22:00: 00:29                          First dose           [facility]

[place]



     Lispro      00   :25                           on Wed           Medical



     (HumaLOG) +                                         22 at           [facility]



     Fsbg                                         1700,           



     Testing                                         Until           



                                                  Discontinu           



                                                  ed,            



                                                  Routine           

 

     acetaminoph            Yes            1000mg      1,000 mg,          

 [facility]



     en                                       Oral, Q8H,           ity of



     (TYLENOL)      21:15:                               First dose           [facility]

[facility]



     tablet      00                                 on Wed           Medical



     1,000 mg                                         22 at           [facility]

h



                                                  1615,           



                                                  Until           



                                                  Discontinu           



                                                  ed,            



                                                  Routine           

 

     FENTanyl PF      2022- No             50ug      50 mcg,           Un

[facility]



     (SUBLIMAZE                                Slow IV           ity o

f



     (PF))      21:07: 22:51                          Push,           [place]



     injection      07   :27                           Q4HPRN,           Medical



     50 mcg                                         Starting           Branch



                                                  on 22 at           



                                                  1607,           



                                                  Until 22 at           



                                                  1751,           



                                                  Routine,           



                                                  Pain           



                                                  (scale           



                                                  7-10)           

 

     insulin      2022- No             30U       30 Units,           Univ

ers



     glargine                                Subcutaneo           ity 

of



     (LANTUS      19:30: 18:47                          us, DAILY,           Eric

as



     U-100)      00   :44                           First dose           Medical



     injection                                         (after           Branch



     30 Units                                         last           



                                                  modificati           



                                                  on) on 22 at           



                                                  1430,           



                                                  Until           



                                                  Discontinu           



                                                  ed,            



                                                  Routine           

 

     glucagon            Yes            1mg       1 mg,           [facility]



     (GLUCAGEN                                     Intramuscu           ity 

of



     DIAGNOSTIC      19:19:                               lar, PRN,           [facility]

[facility]



     KIT)      57                                 Starting           Medical



     injection 1                                         on Wed           Branch



     mg                                           22 at           



                                                  1419,           



                                                  Until           



                                                  Discontinu           



                                                  ed, ASAP,           



                                                  Blood           



                                                  Glucose           



                                                  &amp;lt;           



                                                  or = 70           



                                                  mg/dL and           



                                                  patient is           



                                                  unable to           



                                                  swallow or           



                                                  has mental           



                                                  changes.           

 

     dextrose 50      2022-0      Yes            25mL      25 mL,           Univ

ers



     % in water                                     Slow IV           ity of



     (D50W)      19:19:                               Push, PRN,           [place]



     injection      57                                 Starting           Medica

l



     25 mL                                         on Wed           Branch



                                                  22 at           



                                                  1419,           



                                                  Until           



                                                  Discontinu           



                                                  ed, ASAP,           



                                                  Blood           



                                                  Glucose           



                                                  &amp;lt;           



                                                  or = 70           



                                                  mg/dL and           



                                                  patient is           



                                                  unable to           



                                                  swallow or           



                                                  has mental           



                                                  status           



                                                  changes.           

 

     FENTanyl PF      2022- No             25ug      25 mcg,           Un

[facility]



     (SUBLIMAZE                                Slow IV           ity o

f



     (PF))      18:57: 21:09                          Push,           [place]



     injection      03   :29                           Q4HPRN,           Medical



     25 mcg                                         Starting           Branch



                                                  on 22 at           



                                                  1357,           



                                                  Until 22 at           



                                                  1609,           



                                                  Routine,           



                                                  Pain           



                                                  (scale           



                                                  7-10)           

 

     D5W IV      2022- No             1000mL      at 500           Univer

s



     infusion                                mL/hr, IV           ity o

f



     1,000 mL      18:30: 20:00                          Infusion,           Eric

as



               00   :00                           ONCE, 1           Medical



                                                  dose, On           Branch



                                                  22 at           



                                                  1330,           



                                                  Routine           

 

     NaCl 0.9%      2022- No             1000mL      at 999           Uni

vers



     (NS) bolus                                mL/hr,           ity of



     infusion      18:15: 20:19                          1,000 mL,           Eric

as



     1,000 mL      00   :00                           IV             Medical



                                                  Infusion,           Branch



                                                  ONCE, 1           



                                                  dose, On           



                                                  22 at           



                                                  1315, STAT           

 

     potassium      2022- No             20meq      20 mEq, IV           

[facility]



     chloride 20                                Piggyback,           i

ty of



     mEq/100 mL      17:45: 21:20                          Q1H, 4           [place]

[place]



     (KCL) 20      00   :00                           doses,           Medical



     mEq/100 mL                                         First dose           Bra

[facility]



     RTU IVPB 20                                         on Wed           



     mEq                                          22 at           



                                                  1245, Last           



                                                  dose on           



                                                  22 at           



                                                  1500, 100           



                                                  mL             

 

     KCL       2022- No                       IV             [facility]



     (POTASSIUM                                Infusion,           ity

 of



     CHLORIDE)      17:30: 00:27                          CONTINUOUS           T

[place]



     40 mEq in      00   :47                           , Starting           Medi

sarah



     NaCl 0.45%                                         on Wed           Branch



     (1/2NS) IV                                         22 at           



     Solution                                         1230,           



                                                  Until 22 at           



                                                  1927,           



                                                  1,000 mL,           



                                                  at 200           



                                                  mL/hr           

 

     KCL       2022- No                       IV             [facility]



     (POTASSIUM                                Infusion,           ity

 of



     CHLORIDE)      15:45: 17:15                          CONTINUOUS           T

[facility]



     40 mEq in      00   :42                           , Starting           Medi

sarah



     NaCl 0.45%                                         on Wed           Branch



     (1/2NS) IV                                         22 at           



     Solution                                         1045,           



                                                  Until 22 at           



                                                  1215,           



                                                  1,000 mL,           



                                                  at 150           



                                                  mL/hr           

 

     ondansetron            Yes            4mg       4 mg, Slow           

[facility]



     (ZOFRAN                                     IV Push,           ity of



     (PF))      15:38:                               Q6HPRN,           [place]



     injection 4      50                                 Nausea and           Me

dical



     mg                                           Vomiting           Branch



                                                  (N/V),           



                                                  Starting           



                                                  on 22 at           



                                                  1038<br>Do           



                                                  ses of           



                                                  ondansetro           



                                                  n 16 mg           



                                                  and above           



                                                  need to be           



                                                  administer           



                                                  ed via IV           



                                                  piggyback.           



                                                  For Dose           



                                                  >=24mg ECG           



                                                  monitoring           



                                                  is             



                                                  advisable.           



                                                  <br>           

 

     enoxaparin            Yes            40mg      40 mg,           Unive

rs



     (LOVENOX)                                     Subcutaneo           ity 

of



     injection      14:00:                               us, DAILY,           Te

[facility]



     40 mg      00                                 First dose           Medical



                                                  on Wed           Branch



                                                  22 at           



                                                  0900,           



                                                  Until           



                                                  Discontinu           



                                                  ed,            



                                                  Routine           

 

     fluconazole      2022- No             200mg      at 100           Un

[facility]



     (DIFLUCAN)       07-28                          mL/hr, IV           ity

 of



     Piggyback      13:45: 00:25                          Piggyback,           T

[place]



     200 mg      00   :36                           Q24H ABX,           Medical



                                                  First dose           Branch



                                                  on 22 at           



                                                  0845,           



                                                  Until           



                                                  Discontinu           



                                                  ed,            



                                                  ASAP<br>Do           



                                                  Not            



                                                  Refrigerat           



                                                  e.<br>           

 

     NORepinephr      2022- No             .05ug/k      0.05-1.5         

  [facility]



     ine 4 mg in                      g/min      mcg/kg/min           

ity of



     0.9% NaCl      12:21: 22:51                          ?127 kg           [place]

[facility]



     250 mL      22   :36                           (23.8125-7           Medical



     infusion                                         14.375           Branch



     RTU                                          mL/hr,           



                                                  rounded to           



                                                  23..           



                                                  38 mL/hr),           



                                                  IV             



                                                  Infusion,           



                                                  TITRATE,           



                                                  MAP Goal >           



                                                  or = 65           



                                                  mmHg,           



                                                  Starting           



                                                  on 22 at           



                                                  0721<br>In           



                                                  itiate           



                                                  titration           



                                                  at 0.05           



                                                  mcg/kg/min           



                                                  .&nbsp;&nb           



                                                  sp;Increas           



                                                  e by 0.01           



                                                  mcg/kg/min           



                                                  every 30           



                                                  seconds to           



                                                  5 minutes           



                                                  as needed           



                                                  to reach           



                                                  and            



                                                  maintain           



                                                  goal blood           



                                                  pressure.&           



                                                  nbsp;&nbsp           



                                                  ;Maximum           



                                                  dose = 1.5           



                                                  mcg/kg/min           



                                                  .&nbsp;&nb           



                                                  sp;If goal           



                                                  not            



                                                  maintained           



                                                  at maximum           



                                                  allowed           



                                                  dose,           



                                                  contact           



                                                  prescriber           



                                                  .<br>           

 

     potassium       No             10meq      10 mEq, IV           

[facility]



     chloride in                                Piggyback,           i

ty of



     water 10      12:00: 14:44                          Q1H, 3           [place]



     mEq/100 mL      00   :00                           doses,           Medical



     RTU 10 mEq                                         First dose           Bra

nch



                                                  on 22 at           



                                                  0700, Last           



                                                  dose on           



                                                  22 at           



                                                  0900,           



                                                  Administer           



                                                  over 60           



                                                  Minutes,           



                                                  100 mL           

 

     aztreonam       No             1000mg      1,000 mg,           

[facility]



     (AZACTAM)                                Slow IV           ity of



     injection      12:00: 12:41                          Push, Q8H           [facility]

[place]



     1,000 mg      00   :55                           ABX, First           Medic

al



                                                  dose on           Branch



                                                  22 at           



                                                  0700,           



                                                  Until           



                                                  Discontinu           



                                                  ed<br>Reas           



                                                  on for           



                                                  Anti-Infec           



                                                  tive:           



                                                  Empiric           



                                                  Therapy           



                                                  for            



                                                  Suspected           



                                                  Infection<           



                                                  br>Empiric           



                                                  Therapy           



                                                  Site:           



                                                  Urine&lt;b           



                                                  r>Duration           



                                                  of             



                                                  therapy: 7           



                                                  days           

 

     NaCl 0.9%       No             1000mL      at 999           Uni

vers



     (NS) IV                                mL/hr, IV           ity of



     infusion      11:45: 11:49                          Infusion,           Eric

as



     1,000 mL      00   :30                           ONCE, 1           Medical



                                                  dose, On           Branch



                                                  22 at           



                                                  0645,           



                                                  Routine           

 

     NaCl 0.45%      2022- No             1000mL      at 250           Un

[facility]



     (1/2NS) IV                                mL/hr,           ity of



     infusion      11:30: 14:41                          1,000 mL,           Eric

as



     1,000 mL      00   :42                           IV             Medical



                                                  Infusion,           Branch



                                                  CONTINUOUS           



                                                  , Starting           



                                                  on 22 at           



                                                  0630,           



                                                  Until 22 at           



                                                  0941, ASAP           

 

     insulin      2022- No             0U/kg/h      0-0.3           Unive

rs



     regular in      -27                          Units/kg/h           it

y of



     0.9 % NaCl      11:19: 19:18                          r ?127 kg           T

[facility]



     (MYXREDLIN)      08   :18                           (0-38.1           Medic

al



     100                                          mL/hr), IV           Branch



     unit/100 mL                                         Infusion,           



     (1 unit/mL)                                         TITRATE,           



     RTU IV                                         Parameters           



     infusion                                         in Admin.           



                                                  Instr.,           



                                                  Follow [facility]           



                                                  Insulin           



                                                  Rate           



                                                  Adjustment           



                                                  Protocol,           



                                                  Starting           



                                                  on 22 at           



                                                  0619<br>-           



                                                  Follow           



                                                  '[facility]           



                                                  Insulin           



                                                  Rate           



                                                  Adjustment           



                                                  Protocol'           



                                                  &nbsp;-           



                                                  Start           



                                                  insulin           



                                                  infusion           



                                                  at 0.1           



                                                  units/kg           



                                                  /hr. When           



                                                  adjusting           



                                                  rate, do           



                                                  not            



                                                  increase           



                                                  rate above           



                                                  0.3            



                                                  units/kg/h           



                                                  our.&nbsp;           



                                                  - Hold           



                                                  insulin           



                                                  and [facility]           



                                                  STAT if           



                                                  potassium           



                                                  is less           



                                                  than 3.3           



                                                  mEq/L.&nbs           



                                                  p;- [facility]           



                                                  STAT if           



                                                  blood           



                                                  glucose           



                                                  less than           



                                                  100 mg/dL           



                                                  or greater           



                                                  than 600           



                                                  mg/dL or           



                                                  if blood           



                                                  glucose           



                                                  not            



                                                  decreased           



                                                  by 50           



                                                  mg/dL in           



                                                  the first           



                                                  hour of           



                                                  insulin           



                                                  infusion.&           



                                                  nbsp;-           



                                                  Once blood           



                                                  glucose is           



                                                  less than           



                                                  or equal           



                                                  to 200           



                                                  mg/dL in           



                                                  patient           



                                                  with DKA           



                                                  or blood           



                                                  glucose is           



                                                  less than           



                                                  or equal           



                                                  to 300           



                                                  mg/dL in           



                                                  patient           



                                                  with HHS,           



                                                  change to           



                                                  fluids           



                                                  with           



                                                  dextrose.&           



                                                  nbsp;&amp;           



                                                  nbsp;&nbsp           



                                                  ;- If           



                                                  during           



                                                  insulin           



                                                  infusion           



                                                  and on           



                                                  dextrose           



                                                  fluids           



                                                  blood           



                                                  glucose is           



                                                  less than           



                                                  150 mg/dL           



                                                  in DKA or           



                                                  less than           



                                                  200 mg/dL           



                                                  in HHS,           



                                                  [facility]O for           



                                                  increase           



                                                  in             



                                                  dextrose           



                                                  provided           



                                                  in             



                                                  continuous           



                                                  fluids.&nb           



                                                  sp;- Once           



                                                  AGAP less           



                                                  than 12,           



                                                  blood           



                                                  glucose           



                                                  less than           



                                                  200 mg/dL,           



                                                  TCO2           



                                                  greater           



                                                  than 15 x           



                                                  2 and           



                                                  patient           



                                                  ready to           



                                                  eat, [facility]O           



                                                  for SubQ           



                                                  glargine           



                                                  order two           



                                                  hours           



                                                  before           



                                                  stopping           



                                                  insulin           



                                                  infusion.<           



                                                  br>            

 

     NaCl 0.9%      2022- No             1000mL      at 999           Uni

vers



     (NS) IV      -                          mL/hr, IV           ity of



     infusion      08:30: 11:00                          Infusion,           [facility]

as



     1,000 mL      00   :00                           ONCE, 1           Medical



                                                  dose, On           Branch



                                                  22 at           



                                                  0330,           



                                                  Routine           

 

     NaCl 0.45%      2022- No             1000mL      at 250           Un

[facility]



     (1/2NS) IV                                mL/hr,           ity of



     infusion      06:00: 11:20                          1,000 mL,           Eric

as



     1,000 mL      00   :23                           IV             Medical



                                                  Infusion,           [facility]



                                                  CONTINUOUS           



                                                  , Starting           



                                                  on 22 at           



                                                  0100,           



                                                  Until 22 at           



                                                  0620, ASAP           

 

     FENTanyl PF      2022- No             50ug      50 mcg,           Un

[facility]



     (SUBLIMAZE                                Slow IV           ity o

f



     (PF))      04:15: 03:07                          Push,           [place]



     injection      00   :00                           ONCE, 1           Medical



     50 mcg                                         dose, On           Branch



                                                  22 at           



                                                  2315,           



                                                  Routine           

 

     cefTRIAXone      2022- No             1000mg      1,000 mg,         

  [facility]



     (ROCEPHIN)                                Intravenou           it

y of



     1,000 mg in      04:00: 06:06                          s, ONCE, 1          

 [place]



     NaCl 0.9%      00   :00                           dose, On           Medica

l



     (NS) 50 mL                                         [facility]



     MINI-BAG                                         22 at           



                                                  2300,           



                                                  Administer           



                                                  over 30           



                                                  Minutes,           



                                                  50             



                                                  mL<br&gt;R           



                                                  elmira for           



                                                  Anti-Infec           



                                                  tive:           



                                                  Documented           



                                                  Infection<           



                                                  br>Documen           



                                                  huma            



                                                  Infection           



                                                  Site:           



                                                  Urine<br&g           



                                                  t;Duration           



                                                  of             



                                                  Therapy:           



                                                  Other (see           



                                                  Comments)           

 

     NaCl 0.9%      2022- No             1000mL      at 999           Uni

vers



     (NS) bolus                                mL/hr,           ity of



     infusion      03:00: 04:14                          1,000 mL,           Eric

as



     1,000 mL      00   :00                           IV             Medical



                                                  Infusion,           Branch



                                                  ONCE, 1           



                                                  dose, On           



                                                  22 at           



                                                  2200, STAT           

 

     insulin      2022- No             10U       10 Units,           Univ

ers



     regular                                Subcutaneo           ity o

f



     human      01:30: 00:37                          , ONCE,           [place]



     (HUMULIN R)      00   :00                           1 dose, On           Me

dical



     injection                                         Tue            Branch



     10 Units                                         22 at           



                                                  2030,           



                                                  Routine           

 

     FENTanyl PF      2022- No             50ug      50 mcg,           Un

[facility]



     (SUBLIMAZE                                Slow IV           ity o

f



     (PF))      01:30: 00:28                          Push,           [place]



     injection      00   :00                           ONCE, 1           Medical



     50 mcg                                         dose, On           Branch



                                                  Tue            



                                                  22 at           



                                                  2030,           



                                                  Routine           

 

     NaCl 0.9%       No             1000mL      at 999           Uni

vers



     (NS) bolus      -                          mL/hr,           ity of



     infusion      00:30: 02:09                          1,000 mL,           Eric

as



     1,000 mL      00   :00                           IV             Medical



                                                  Infusion,           Branch



                                                  ONCE, 1           



                                                  dose, On           



                                                  Tue            



                                                  22 at           



                                                  1930, STAT           

 

     iopamidol      2022- No        [id number] 65mL      65 mL,         

  [facility]



     (ISOVUE                 [id number]           Intravenou           ity

 of



     370-500 mL)      02:29: 02:45                          s, ONCE, 1          

 [place]



     injection      00   :00                           dose, On           Medica

l



     65 mL                                         Fri            Branch



                                                  7/15/22 at           



                                                  2145,           



                                                  Routine           

 

     FENTanyl PF      2022- No             150ug      150 mcg,           

[facility]



     (SUBLIMAZE                                Slow IV           ity o

f



     (PF))      01:32: 01:44                          Push,           [place]



     injection      00   :00                           ONCE, 1           Medical



     150 mcg                                         dose, On           Branch



                                                  Fri            



                                                  7/15/22 at           



                                                  2045,           



                                                  Routine           

 

     FENTanyl PF      2022- No             50ug      50 mcg,           Un

[facility]



     (SUBLIMAZE      7-16 07-15                          Slow IV           ity o

f



     (PF))      00:30: 23:32                          Push,           [place]



     injection      00   :00                           ONCE, 1           Medical



     50 mcg                                         dose, On           Branch



                                                  Fri            



                                                  7/15/22 at           



                                                  1930,           



                                                  Routine           

 

     insulin      2022- No             10U       10 Units,           Univ

ers



     regular      7-16 07-15                          Subcutaneo           ity o

f



     human      00:30: 23:28                          , ONCE,           [place]



     (HUMULIN R)      00   :00                           1 dose, On           Me

dical



     injection                                         Fri            Branch



     10 Units                                         7/15/22 at           



                                                  1930,           



                                                  Routine           

 

     clindamycin      2022- No        [id number] 600mg      Take 2      

     [facility]



     300 mg      7-15 07-23           [id number]           capsules           ity of



     capsule      00:00: 04:59                          by mouth 4           Eric

as



               00   :00                           (four)           Medical



                                                  times           Branch



                                                  daily for           



                                                  7 days.           

 

     clindamycin      2022- No        [id number] 600mg      Take 2      

     [facility]



     300 mg      7-15 07-23           [id number]           capsules           ity of



     capsule      00:00: 04:59                          by mouth 4           Eric

as



               00   :00                           (four)           Medical



                                                  times           Branch



                                                  daily for           



                                                  7 days.           

 

     insulin NPH      2022- No             8U        8 Units,           U

nivers



     and regular       07-06                          Subcutaneo           i

ty of



     human 70-30      12:30: 11:30                          us, ONCE,           

[place]



     (70-30      00   :00                           1 dose, On           Medical



     U-100                                         Wed 22           Branch



     INSULIN)                                         at 0730,           



     100 unit/mL                                         ASAP           



     (70-30)                                                        



     injection 8                                                        



     Units                                                        

 

     insulin            Yes       [id number] 20U       inject 20           U

nivers



     glargine      7-02                               Units           ity of



     100 unit/mL      00:00:                               under the           T

[facility]



     injection      00                                 skin           Medical



                                                  daily.           Branch

 

     insulin            Yes       [id number] 20U       inject 20           U

nivers



     glargine      7-02                               Units           ity of



     100 unit/mL      00:00:                               under the           T

[facility]



     injection      00                                 skin           Medical



                                                  daily.           Branch

 

     insulin            Yes       [id number] 20U       inject 20           U

nivers



     glargine      7-02                               Units           ity of



     100 unit/mL      00:00:                               under the           T

[facility]as



     injection      00                                 skin           Medical



                                                  daily.           Branch

 

     insulin            Yes       [id number] 20U       inject 20           U

nivers



     glargine      7-02                               Units           ity of



     100 unit/mL      00:00:                               under the           T

exas



     injection      00                                 skin           Medical



                                                  daily.           Branch

 

     insulin      2022- No        [id number] 20U       inject 20           

[facility]



     glargine      7-02 07-30                          Units           ity of



     100 unit/mL      00:00: 00:00                          under the           

[place]



     injection      00   :00                           skin           Medical



                                                  daily.           Branch

 

     insulin      2022- No        [id number] 20U       inject 20           

[facility]



     glargine      7-02 07-30                          Units           ity of



     100 unit/mL      00:00: 00:00                          under the           

[place]



     injection      00   :00                           skin           Medical



                                                  daily.           Branch

 

     insulin            Yes            20U       20 Units,           Unive

rs



     glargine                                     Subcutaneo           ity o

f



     (LANTUS      14:00:                               us, DAILY,           [place]

s



     U-100)      00                                 First dose           Medical



     injection                                         (after           Branch



     20 Units                                         last           



                                                  modificati           



                                                  on) on 22 at           



                                                  0900,           



                                                  Until           



                                                  Discontinu           



                                                  ed,            



                                                  Routine           

 

     insulin      2022- No        [id number] 26U       inject 26           

[facility]



     lispro,       08-                          Units           ity of



     human, 100      00:00: 04:59                          under the           T

[facility]



     unit/mL      00   :00                           skin 3           Medical



     injection                                         (three)           Branch



                                                  times           



                                                  daily           



                                                  before           



                                                  meals for           



                                                  30 days.           

 

     insulin      2022- No        [id number] 26U       inject 26           

[facility]



     lispro,       08-                          Units           ity of



     human, 100      00:00: 04:59                          under the           T

exas



     unit/mL      00   :00                           skin 3           Medical



     injection                                         (three)           Branch



                                                  times           



                                                  daily           



                                                  before           



                                                  meals for           



                                                  30 days.           

 

     insulin      2022- No        [id number] 26U       inject 26           

[facility]



     lispro,       08-                          Units           ity of



     human, 100      00:00: 04:59                          under the           [facility]

[facility]



     unit/mL      00   :00                           skin 3           Medical



     injection                                         (three)           [facility]



                                                  times           



                                                  daily           



                                                  before           



                                                  meals for           



                                                  30 days.           

 

     insulin      2022- No        [id number] 26U       inject 26           

[facility]



     lispro,      -                          Units           ity of



     human, 100      00:00: 04:59                          under the           [facility]

[facility]



     unit/mL      00   :00                           skin 3           Medical



     injection                                         (three)           [facility]



                                                  times           



                                                  daily           



                                                  before           



                                                  meals for           



                                                  30 days.           

 

     insulin      2022- No        [id number] 26U       inject 26           

[facility]



     lispro,       07-30                          Units           ity of



     human, 100      00:00: 00:00                          under the           [facility]

[facility]



     unit/mL      00   :00                           skin 3           Medical



     injection                                         (three)           [facility]



                                                  times           



                                                  daily           



                                                  before           



                                                  meals for           



                                                  30 days.           

 

     insulin      2022- No        [id number] 26U       inject 26           

[facility]



     lispro,       07-30                          Units           ity of



     human, 100      00:00: 00:00                          under the           T

[facility]



     unit/mL      00   :00                           skin 3           Medical



     injection                                         (three)           [facility]



                                                  times           



                                                  daily           



                                                  before           



                                                  meals for           



                                                  30 days.           

 

     insulin            Yes            26U       26 Units,           Unive

rs



     lispro      6-30                               Subcutaneo           ity of



     (human)      22:15:                               us, TIDAC,           [place]

[place]



     (HumaLOG      00                                 First dose           Medic

al



     U-100)                                         (after           [facility]anch



     injection                                         last           



     26 Units                                         modificati           



                                                  on) on Thu           



                                                  22 at           



                                                  1715,           



                                                  Until           



                                                  Discontinu           



                                                  ed,            



                                                  Routine           

 

     insulin      2022- No             22U       22 Units,           Univ

ers



     lispro                                Subcutaneo           ity of



     (human)      16:30: 21:36                          us, TIDAC,           [facility]

as



     (HumaLOG      00   :36                           First dose           Medic

al



     U-100)                                         (after           [facility]anch



     injection                                         last           



     22 Units                                         modificati           



                                                  on) on 22 at           



                                                  1130,           



                                                  Until           



                                                  Discontinu           



                                                  ed,            



                                                  Routine           

 

     insulin      2022- No             10U       10 Units,           Univ

ers



     glargine                                Subcutaneo           ity 

of



     (LANTUS      14:00: 17:40                          us, DAILY,           [facility]

as



     U-100)      00   :01                           First dose           Medical



     injection                                         on Thu           Branch



     10 Units                                         22 at           



                                                  0900,           



                                                  Until           



                                                  Discontinu           



                                                  ed,            



                                                  Routine           

 

     insulin            Yes            52U       52 Units,           Unive

rs



     glargine                                     Subcutaneo           ity o

f



     (LANTUS      02:00:                               us, [facility],           [place]



     U-100)      00                                 First dose           Medical



     injection                                         on Wed           Branch



     52 Units                                         22 at           



                                                  2100,           



                                                  Until           



                                                  Discontinu           



                                                  ed,            



                                                  Routine           

 

     enoxaparin            Yes            40mg      40 mg,           Unive

rs



     (LOVENOX)                                     Subcutaneo           ity 

of



     injection      22:00:                               us, DAILY,           [place]

[place]



     40 mg      00                                 First dose           Medical



                                                  on Wed           Branch



                                                  22 at           



                                                  1700,           



                                                  Until           



                                                  Discontinu           



                                                  ed,            



                                                  Routine           

 

     Sliding            Yes                      Subcutaneo           Univ

ers



     Scale                                     us, TID           ity of



     Insulin -      17:00:                               MEALS+HS,           Eric

as



     Lispro      00                                 First dose           Medical



     (HumaLOG) +                                         (after           Branch



     Fsbg                                         last           



     Testing                                         modificati           



                                                  on) on 22 at           



                                                  1200,           



                                                  Until           



                                                  Discontinu           



                                                  ed,            



                                                  Routine           

 

     insulin      2022- No             18U       18 Units,           Univ

ers



     lispro                                Subcutaneo           ity of



     (human)      16:30: 14:46                          us, TIDAC,           [facility]

as



     (HumaLOG      00   :32                           First dose           Medic

al



     U-100)                                         (after           Branch



     injection                                         last           



     18 Units                                         modificati           



                                                  on) on 22 at           



                                                  1130,           



                                                  Until           



                                                  Discontinu           



                                                  ed,            



                                                  Routine           

 

     amLODIPine            Yes            10mg      10 mg,           Unive

rs



     (NORVASC)                                     Oral,           ity of



     tablet 10      14:00:                               DAILY,           [place]



     mg        00                                 First dose           Medical



                                                  on Wed           Branch



                                                  22 at           



                                                  0900,           



                                                  Until           



                                                  Discontinu           



                                                  ed,            



                                                  Routine           

 

     cefTRIAXone            Yes            1000mg      1,000 mg,          

 [facility]



     (ROCEPHIN)                                     IV             ity of



     1,000 mg in      13:00:                               Jass,           

[place]



     NaCl 0.9%      00                                 Q24H ABX,           Medic

al



     (NS) 50 mL                                         First dose           Bra

[facility]



     MINI-BAG                                         on 22 at           



                                                  0800,           



                                                  Until           



                                                  Discontinu           



                                                  ed,            



                                                  Administer           



                                                  over 30           



                                                  Minutes,           



                                                  50             



                                                  mL<br>Reas           



                                                  on for           



                                                  Anti-Infec           



                                                  tive:           



                                                  Documented           



                                                  Infection<           



                                                  br>Documen           



                                                  huma            



                                                  Infection           



                                                  Site:           



                                                  Urine<br>D           



                                                  uration of           



                                                  Therapy: 7           



                                                  days           

 

     Sliding                             Subcutaneo           Uni

vers



     Scale                                us, TID           ity of



     Insulin -      13:00: 15:19                          MEALS+HS,           [facility]

[facility]



     Lispro      00   :51                           First dose           Medical



     (HumaLOG) +                                         on Wed           Branch



     Fsbg                                         22 at           



     Testing                                         0800,           



                                                  Until           



                                                  Discontinu           



                                                  ed,            



                                                  Routine           

 

     insulin       No             15U       15 Units,           Univ

ers



     lispro                                Subcutaneo           ity of



     (human)      12:30: 15:19                          us, TIDAC,           Eric

as



     (HumaLOG      00   :51                           First dose           Medic

al



     U-100)                                         on Wed           Branch



     injection                                         22 at           



     15 Units                                         0730,           



                                                  Until           



                                                  Discontinu           



                                                  ed,            



                                                  Routine           

 

     HYDROmorpho                   1mg       1 mg, Slow          

 [facility]



     ne         0701                          IV Push,           ity of



     (DILAUDID)      11:32: 11:31                          Q6HPRN,           Eric

as



     injection 1      00   :00                           Starting           Medi

sarah



     mg                                           on Wed           Branch



                                                  22 at           



                                                  0632,           



                                                  Until 22 at           



                                                  0631,           



                                                  Routine,           



                                                  Pain           



                                                  (scale           



                                                  7-10)<br>U           



                                                  se             



                                                  approved           



                                                  by             



                                                  (Faculty):           



                                                  [facility]            



                                                  PROVIDER           

 

     NaCl 0.9%       No             1000mL      at 999           Uni

vers



     (NS) bolus                                mL/hr,           ity of



     infusion      11:00: 11:04                          1,000 mL,           Eric

as



     1,000 mL      00   :00                           IV             Medical



                                                  Infusion,           Branch



                                                  ONCE, 1           



                                                  dose, On           



                                                  22 at           



                                                  0600, STAT           

 

     HYDROcodone      0      Yes            1{tbl}      1 tablet,          

 [facility]



     -acetaminop                                     Oral,           ity of



     hen (NORCO      10:54:                               Q6HPRN,           [place]

[place]



     5) 5-325 mg      27                                 Starting           Medi

sarah



     tablet 1                                         on Wed           Branch



     tablet                                         22 at           



                                                  0554,           



                                                  Until           



                                                  Discontinu           



                                                  ed,            



                                                  Routine,           



                                                  Pain           



                                                  (scale           



                                                  4-6)           

 

     dextrose      0      Yes            250mL      250 mL, IV           Un

[facility]



     10% (D10W)                                     Infusion,           ity 

of



     bolus      10:53:                               PRN - SEE           [place]



     infusion      43                                 INSTRUCTIO           Medic

al



     250 mL                                         NS,            Branch



                                                  Administer           



                                                  over 60           



                                                  Minutes,           



                                                  hypoglycem           



                                                  ia,            



                                                  Starting           



                                                  on 22 at           



                                                  0553<br>De           



                                                  xtrose 10%           



                                                  250 mL bag           



                                                  contains:&           



                                                  nbsp;10 gm           



                                                  = 100           



                                                  mL&nbsp;20           



                                                  gm = 200           



                                                  mL&nbsp;25           



                                                  gm = 250           



                                                  mL (whole           



                                                  bag)&nbsp;           



                                                  &nbsp;The           



                                                  maximum           



                                                  rate at           



                                                  which           



                                                  dextrose           



                                                  can be           



                                                  infused           



                                                  without           



                                                  producing           



                                                  glycosuria           



                                                  is 0.5           



                                                  g/kg/hour.           



                                                  &nbsp;&nbs           



                                                  p;BUD: If           



                                                  wrapper is           



                                                  open bag           



                                                  is good           



                                                  for 30           



                                                  days at           



                                                  room           



                                                  temperatur           



                                                  e.&nbsp;<b           



                                                  r>             

 

     glucagon            Yes            1mg       1 mg,           [facility]



     (GLUCAGEN                                     Intramuscu           ity 

of



     DIAGNOSTIC      10:53:                               lar, PRN,           Te

[facility]



     KIT)      40                                 Starting           Medical



     injection 1                                         on Wed           Branch



     mg                                           22 at           



                                                  0553,           



                                                  Until           



                                                  Discontinu           



                                                  ed, ASAP,           



                                                  Blood           



                                                  Glucose           



                                                  &amp;lt;           



                                                  or = 70           



                                                  mg/dL and           



                                                  patient is           



                                                  unable to           



                                                  swallow or           



                                                  has mental           



                                                  changes.           

 

     acetaminoph            Yes            650mg      650 mg,           Un

[place]



     en                                       Oral,           ity of



     (TYLENOL)      10:52:                               Q6HPRN,           [place]



     tablet 650      33                                 Starting           Medic

al



     mg                                           on Wed           Branch



                                                  22 at           



                                                  0552,           



                                                  Until           



                                                  Discontinu           



                                                  ed,            



                                                  Routine,           



                                                  Pain           



                                                  (scale           



                                                  1-3)           

 

     NaCl 0.9%      0 - No             1000mL      at 999           Uni

vers



     (NS) bolus       06-29                          mL/hr,           ity of



     infusion      09:00: 08:10                          1,000 mL,           Eric

as



     1,000 mL      00   :00                           IV             Medical



                                                  Infusion,           Branch



                                                  ONCE, 1           



                                                  dose, On           



                                                  22 at           



                                                  0400, STAT           

 

     FENTanyl PF      2022- No             50ug      50 mcg,           Un

[place]



     (SUBLIMAZE                                Slow IV           ity o

f



     (PF))      08:22: 08:27                          Push,           [place]



     injection      00   :00                           ONCE, 1           Medical



     50 mcg                                         dose, On           [facility]



                                                  22 at           



                                                  0330,           



                                                  Routine           

 

     insulin      2022- No             10U       10 Units,           Univ

ers



     regular                                Slow IV           ity of



     human      07:00: 05:53                          Push,           [place]



     (HUMULIN R)      00   :00                           ONCE, 1           Medic

al



     injection                                         dose, On           [facility]



     10 Units                                         22 at           



                                                  0200, STAT           

 

     FENTanyl PF       No             50ug      50 mcg,           Un

[facility]



     (SUBLIMAZE                                Slow IV           ity o

f



     (PF))      04:15: 04:04                          Push,           [place]



     injection      00   :00                           ONCE, 1           Medical



     50 mcg                                         dose, On           [facility]



                                                  22 at           



                                                  2315, STAT           

 

     NaCl 0.9%      2022- No             1000mL      at 999           Uni

vers



     (NS) bolus                                mL/hr,           ity of



     infusion      04:00: 05:53                          1,000 mL,           [facility]

as



     1,000 mL      00   :00                           IV             Medical



                                                  Infusion,           [facility]



                                                  ONCE, 1           



                                                  dose, On           



                                                  22 at           



                                                  2300, STAT           

 

     insulin      -2022- No             10U       10 Units,           Univ

ers



     regular                                Slow IV           ity of



     human      04:00: 03:48                          Push,           [place]



     (HUMULIN R)      00   :00                           ONCE, 1           Medic

al



     injection                                         dose, On           [facility]



     10 Units                                         22 at           



                                                  2300, STAT           

 

     amLODIPine      -0 - No        [id number] 10mg      Take 1           

[facility]



     10 mg      6- 07-30                          tablet by           ity of



     tablet      00:00: 00:00                          mouth           [place]



               00   :00                           daily for           Medical



                                                  30 days.           [facility]

 

     amLODIPine      2022- No        [id number] 10mg      Take 1           

[facility]



     10 mg      6-26 07-30                          tablet by           ity of



     tablet      00:00: 00:00                          mouth           [place]



               00   :00                           daily for           Medical



                                                  30 days.           [facility]

 

     amLODIPine      0 - No        [id number] 10mg      Take 1           

[facility]



     10 mg      6-26 07-27                          tablet by           ity of



     tablet      00:00: 04:59                          mouth           [place]



               00   :00                           daily for           Medical



                                                  30 days.           [place]

 

     amLODIPine      2022- No        [id number] 10mg      Take 1           

[facility]



     10 mg      6-26 07-27                          tablet by           ity of



     tablet      00:00: 04:59                          mouth           [place]



               00   :00                           daily for           Medical



                                                  30 days.           [place]

 

     amLODIPine       No        [id number] 10mg      Take 1           

[facility]



     10 mg      6-26 07-27                          tablet by           ity of



     tablet      00:00: 04:59                          mouth           [place]



               00   :00                           daily for           Medical



                                                  30 days.           [place]

 

     amLODIPine      2022- No        [id number] 10mg      Take 1           

[facility]



     10 mg      6-26 07-27                          tablet by           ity of



     tablet      00:00: 04:59                          mouth           [place]



               00   :00                           daily for           Medical



                                                  30 days.           [place]

 

     amLODIPine      2022- No        [id number] 10mg      Take 1           

[facility]



     10 mg      6-26 07-27                          tablet by           ity of



     tablet      00:00: 04:59                          mouth           [place]



               00   :00                           daily for           Medical



                                                  30 days.           [place]

 

     amLODIPine      2022- No        [id number] 10mg      Take 1           

[facility]



     10 mg      6-26 07-27                          tablet by           ity of



     tablet      00:00: 04:59                          mouth           [place]



               00   :00                           daily for           Medical



                                                  30 days.           [place]

 

     amLODIPine      2022- No        [id number] 10mg      Take 1           

[facility]



     10 mg      6-26 07-27                          tablet by           ity of



     tablet      00:00: 04:59                          mouth           [place]



               00   :00                           daily for           Medical



                                                  30 days.           [place]

 

     HYDROmorpho      2022- No             .5mg      0.5 mg,           Un

[facility]



     ne         06-25                          Slow IV           ity of



     (DILAUDID)      18:00: 17:11                          Push,           [place]



     injection      00   :00                           ONCE, 1           Medical



     0.5 mg                                         dose, On           [facility]



                                                  Sat            



                                                  22 at           



                                                  1300,           



                                                  Routine<br           



                                                  >Use           



                                                  approved           



                                                  by             



                                                  (Faculty):           



                                                  ADC            



                                                  PROVIDER           

 

     cefTRIAXone            Yes            1000mg      1,000 mg,          

 [facility]



     (ROCEPHIN)                                     IV             ity of



     1,000 mg in      15:00:                               Piggyback,           

[place]



     NaCl 0.9%      00                                 Q24H ABX,           Medic

al



     (NS) 50 mL                                         First dose           [facility]

[facility]



     MINI-BAG                                         on Sat           



                                                  22 at           



                                                  1000,           



                                                  Until           



                                                  Discontinu           



                                                  ed,            



                                                  Administer           



                                                  over 30           



                                                  Minutes,           



                                                  50             



                                                  mL<br>Reas           



                                                  on for           



                                                  Anti-Infec           



                                                  tive:           



                                                  Documented           



                                                  Infection<           



                                                  br>Documen           



                                                  huma            



                                                  Infection           



                                                  Site:           



                                                  Urine<br>D           



                                                  uration of           



                                                  Therapy: 7           



                                                  days           

 

     fluconazole            Yes            200mg      200 mg,           Un

[place]



     (DIFLUCAN)                                     Oral,           ity of



     tablet 200      14:00:                               DAILY,           [place]



     mg        00                                 First dose           Medical



                                                  on Sat           [facility]



                                                  22 at           



                                                  0900,           



                                                  Until           



                                                  Discontinu           



                                                  ed,            



                                                  ASAP<br>Re           



                                                  ason for           



                                                  Anti-Infec           



                                                  tive:           



                                                  Documented           



                                                  Infection<           



                                                  br>Documen           



                                                  huma            



                                                  Infection           



                                                  Site:           



                                                  Urine<br>D           



                                                  uration of           



                                                  Therapy: 7           



                                                  days           

 

     Sliding            Yes                      Subcutaneo           [facility]

ers



     Scale                                     us, TID           ity of



     Insulin -      13:00:                               MEALS+HS,           [facility]

as



     Lispro      00                                 First dose           Medical



     (HumaLOG) +                                         on [facility]



     Fsbg                                         22 at           



     Testing                                         0800,           



                                                  Until           



                                                  Discontinu           



                                                  ed,            



                                                  Routine           

 

     hydromorpho      2022- No             4ug       Take 4 mcg          

 [facility]



     ne HCl                                by mouth           ity of



     (HYDROMORPH      11:55: 00:00                          every 12           [facility]

[place]



     ONE ORAL)      19   :00                           (twelve)           Medica

l



                                                  hours.           [place]

 

     insulin      2022- No             10U       10 Units,           Univ

ers



     lispro                                Subcutaneo           ity of



     (human)      07:00: 06:53                          us, ONCE,           [place]

[place]



     (HumaLOG      00   :00                           1 dose, On           Medic

al



     U-100)                                         Sat            [facility]



     injection                                         22 at           



     10 Units                                         0200,           



                                                  Routine           

 

     glucagon            Yes            1mg       1 mg,           [facility]



     (GLUCAGEN                                     Intramuscu           ity 

of



     DIAGNOSTIC      05:50:                               lar, PRN,           [place]

[place]



     KIT)      51                                 Starting           Medical



     injection 1                                         on [facility]



     mg                                           22 at           



                                                  0050,           



                                                  Until           



                                                  Discontinu           



                                                  ed, ASAP,           



                                                  Blood           



                                                  Glucose           



                                                  &amp;lt;           



                                                  or = 70           



                                                  mg/dL and           



                                                  patient is           



                                                  unable to           



                                                  swallow or           



                                                  has mental           



                                                  changes.           

 

     dextrose            Yes            250mL      250 mL, IV           Un

[place]



     10% (D10W)                                     Infusion,           ity 

of



     bolus      05:50:                               PRN - SEE           [place]



     infusion      51                                 INSTRUCTIO           Medic

al



     250 mL                                         NS,            [facility]



                                                  Administer           



                                                  over 60           



                                                  Minutes,           



                                                  BS < 70,           



                                                  Starting           



                                                  on Sat           



                                                  22 at           



                                                  0050<br>De           



                                                  xtrose 10%           



                                                  250 mL bag           



                                                  contains:&           



                                                  nbsp;10 gm           



                                                  = 100           



                                                  mL&nbsp;20           



                                                  gm = 200           



                                                  mL&nbsp;25           



                                                  gm = 250           



                                                  mL (whole           



                                                  bag)&nbsp;           



                                                  &nbsp;The           



                                                  maximum           



                                                  rate at           



                                                  which           



                                                  dextrose           



                                                  can be           



                                                  infused           



                                                  without           



                                                  producing           



                                                  glycosuria           



                                                  is 0.5           



                                                  g/kg/hour.           



                                                  &nbsp;&nbs           



                                                  p;BUD: If           



                                                  wrapper is           



                                                  open bag           



                                                  is good           



                                                  for 30           



                                                  days at           



                                                  room           



                                                  temperatur           



                                                  e.&nbsp;<b           



                                                  r>             

 

     insulin            Yes            52U       52 Units,           Unive

rs



     glargine                                     Subcutaneo           ity o

f



     (LANTUS      02:00:                               us, [facility],           [place]



     U-100)      00                                 First dose           Medical



     injection                                         on Fri           [facility]



     52 Units                                         22 at           



                                                  2100,           



                                                  Until           



                                                  Discontinu           



                                                  ed,            



                                                  Routine           

 

     Sliding      2022- No                       Subcutaneo           Uni

vers



     Scale                                us, TID           ity of



     Insulin -      02:00: 05:50                          MEALS+HS,           [place]

[place]



     Lispro      00   :40                           First dose           Medical



     (HumaLOG) +                                         on Fri           [facility]



     Fsbg                                         22 at           



     Testing                                         2100,           



                                                  Until           



                                                  Discontinu           



                                                  ed,            



                                                  Routine           

 

     ciprofloxac      2022- No        [id number] 500mg      Take 1         

  [facility]



     in HCl 500      -                          tablet by           ity

 of



     mg tablet      00:00: 04:59                          mouth           [place]



               00   :00                           every 12           Medical



                                                  (twelve)           [facility]



                                                  hours for           



                                                  10 days.           

 

     ciprofloxac      2022- No        [id number] 500mg      Take 1         

  [facility]



     in HCl 500      -                          tablet by           ity

 of



     mg tablet      00:00: 04:59                          mouth           [place]



               00   :00                           every 12           Medical



                                                  (twelve)           [facility]



                                                  hours for           



                                                  10 days.           

 

     ciprofloxac      2022- No        [id number] 500mg      Take 1         

  [facility]



     in HCl 500      -                          tablet by           ity

 of



     mg tablet      00:00: 04:59                          mouth           [place]



               00   :00                           every 12           Medical



                                                  (twelve)           [facility]



                                                  hours for           



                                                  10 days.           

 

     HYDROmorpho      2022- No        [id number] 4mg       Take 1           Uni

vers



     ne 4 mg                                tablet by           ity of



     tablet      00:00: 04:59                          mouth           [place]



               00   :00                           every 12           Medical



                                                  (twelve)           [facility]



                                                  hours for           



                                                  7 days.           



                                                  Indication           



                                                  s: acute           



                                                  pain           

 

     HYDROmorpho      -2022- No        4647 4mg       Take 1           Uni

vers



     ne 4 mg                                tablet by           ity of



     tablet      00:00: 04:59                          mouth           [place]



               00   :00                           every 12           Medical



                                                  (twelve)           [facility]



                                                  hours for           



                                                  7 days.           



                                                  Indication           



                                                  s: acute           



                                                  pain           

 

     HYDROmorpho      -2022- No        4647 4mg       Take 1           Uni

vers



     ne 4 mg                                tablet by           ity of



     tablet      00:00: 04:59                          mouth           [place]



               00   :00                           every 12           Medical



                                                  (twelve)           [facility]



                                                  hours for           



                                                  7 days.           



                                                  Indication           



                                                  s: acute           



                                                  pain           

 

     HYDROmorpho      2022- No        4647 4mg       Take 1           Uni

vers



     ne 4 mg                                tablet by           ity of



     tablet      00:00: 04:59                          mouth           [place]



               00   :00                           every 12           Medical



                                                  (twelve)           [facility]



                                                  hours for           



                                                  7 days.           



                                                  Indication           



                                                  s: acute           



                                                  pain           

 

     ciprofloxac      2022- No        [id number] 500mg      Take 1         

  [facility]



     in HCl 500                                tablet by           ity

 of



     mg tablet      00:00: 00:00                          mouth           [place]



               00   :00                           every 12           Medical



                                                  (twelve)           [facility]



                                                  hours for           



                                                  10 days.           

 

     NaCl 0.9%      2022- No             1000mL      at 150           [facility]

vers



     (NS) bolus      24                          mL/hr,           ity of



     infusion      23:15: 23:38                          1,000 mL,           [facility]

as



     1,000 mL      00   :00                           IV             Medical



                                                  Piggyback,           Branch



                                                  ONCE, 1           



                                                  dose, On           



                                                  22 at           



                                                  1815, STAT           

 

     HYDROmorpho      2022- No             1mg       1 mg, Slow          

 [facility]



     ne        24                          IV Push,           ity of



     (DILAUDID)      22:15: 21:46                          ONCE, 1           [facility]

as



     injection 1      00   :00                           dose, On           Medi

sarah



     mg                                           Fri            [facility]



                                                  22 at           



                                                  1715,           



                                                  Routine<br           



                                                  >Use           



                                                  approved           



                                                  by             



                                                  (Faculty):           



                                                  ADC            



                                                  PROVIDER           

 

     enoxaparin            Yes            40mg      40 mg,           Unive

rs



     (LOVENOX)                                     Subcutaneo           ity 

of



     injection      22:00:                               us, DAILY,           [facility]

[facility]



     40 mg      00                                 First dose           Medical



                                                  on Fri           [facility]



                                                  22 at           



                                                  1700,           



                                                  Until           



                                                  Discontinu           



                                                  ed,            



                                                  Routine           

 

     glucagon      -0      Yes            1mg       1 mg,           [facility]



     (GLUCAGEN      24                               Intravenou           ity 

of



     DIAGNOSTIC      21:57:                               s, PRN -           [facility]

as



     KIT)      53                                 SEE            Medical



     injection 1                                         INSTRUCTIO           Br

anch



     mg                                           NS,            



                                                  Starting           



                                                  on 22 at           



                                                  1657,           



                                                  Until           



                                                  Discontinu           



                                                  ed,            



                                                  Routine,           



                                                  hypoglycem           



                                                  ia             

 

     glucagon      -0      Yes            1mg       1 mg,           [facility]



     (GLUCAGEN      24                               Intramuscu           ity 

of



     DIAGNOSTIC      21:57:                               lar, PRN,           [facility]

[facility]



     KIT)      44                                 Starting           Medical



     injection 1                                         on Fri           [facility]



     mg                                           22 at           



                                                  1657,           



                                                  Until           



                                                  Discontinu           



                                                  ed, ASAP,           



                                                  Blood           



                                                  Glucose           



                                                  &amp;lt;           



                                                  or = 70           



                                                  mg/dL and           



                                                  patient is           



                                                  unable to           



                                                  swallow or           



                                                  has mental           



                                                  changes.           

 

     HYDROmorpho      -0      Yes            4mg       4 mg,           Unive

rs



     ne                                       Oral,           ity of



     (DILAUDID)      13:00:                               Q12H,           [place]



     tablet 4 mg      00                                 First dose           Me

dical



                                                  on Fri           [facility]



                                                  22 at           



                                                  0800,           



                                                  Until           



                                                  Discontinu           



                                                  ed             

 

     insulin      -0      Yes            15U       15 Units,           Unive

rs



     lispro      -24                               Subcutaneo           ity of



     (human)      12:30:                               us, TIDAC,           [place]

s



     (HumaLOG      00                                 First dose           Medic

al



     U-100)                                         on Fri           [facility]anch



     injection                                         22 at           



     15 Units                                         0730,           



                                                  Until           



                                                  Discontinu           



                                                  ed,            



                                                  Routine           

 

     amLODIPine      -0      Yes            10mg      10 mg,           Unive

rs



     (NORVASC)      24                               Oral,           ity of



     tablet 10      08:45:                               DAILY,           [place]



     mg        00                                 First dose           Medical



                                                  on Fri           [facility]



                                                  22 at           



                                                  0345,           



                                                  Until           



                                                  Discontinu           



                                                  ed,            



                                                  Routine           

 

     ertapenem      -0 202- No             1000mg      1,000 mg,           

[facility]



     (INVANZ)       06-25                          IV             ity of



     1,000 mg in      08:45: 13:51                          Piggyback,          

 [place]



     NaCl 0.9%      00   :26                           Q24H ABX,           Medic

al



     (NS) 50 mL                                         First dose           Bra

nch



     MINI-BAG                                         on 22 at           



                                                  0345,           



                                                  Until           



                                                  Discontinu           



                                                  ed,            



                                                  Administer           



                                                  over 30           



                                                  Minutes,           



                                                  50             



                                                  mL<br>Reas           



                                                  on for           



                                                  Anti-Infec           



                                                  tive:           



                                                  Empiric           



                                                  Therapy           



                                                  for            



                                                  Suspected           



                                                  Infection<           



                                                  br>Empiric           



                                                  Therapy           



                                                  Site:           



                                                  Urine<br>D           



                                                  uration of           



                                                  therapy: 7           



                                                  days<br>Re           



                                                  stricted           



                                                  use            



                                                  approved           



                                                  by:            



                                                  History of           



                                                  ESBL           



                                                  infection           



                                                  in past 3           



                                                  months           

 

     hydralAZINE            Yes            10mg      10 mg,           Univ

ers



     (APRESOLINE                                     Slow IV           ity o

f



     ) injection      08:33:                               Push,           [place]



     10 mg      28                                 Q6HPRN,           Medical



                                                  Starting           [facility]



                                                  on 22 at           



                                                  0333,           



                                                  Until           



                                                  Discontinu           



                                                  ed,            



                                                  Routine,           



                                                  DBP=&gt;10           



                                                  0;             



                                                  SBP=>160,           



                                                  For SBP >           



                                                  160            

 

     acetaminoph            Yes            650mg      650 mg,           Un

[facility]



     en                                       Oral,           ity of



     (TYLENOL)      07:37:                               Q6HPRN,           [place]



     tablet 650      37                                 Starting           Medic

al



     mg                                           on Fri           [facility]



                                                  22 at           



                                                  0237,           



                                                  Until           



                                                  Discontinu           



                                                  ed,            



                                                  Routine,           



                                                  Pain           



                                                  (scale           



                                                  1-3)           

 

     iopamidol      2022- No        [id number] 100mL      100 mL,          

 [facility]



     (ISOVUE                                Intravenou           ity o

f



     370-500 mL)      06:15: 05:05                          s, ONCE, 1          

 [place]



     injection      00   :00                           dose, On           Medica

l



     100 mL                                         Fri            [facility]



                                                  22 at           



                                                  0115,           



                                                  Routine           

 

     insulin      2022- No             10U       10 Units,           Univ

ers



     regular                                IV Push,           ity of



     human      06:00: 05:20                          ONCE, 1           [place]



     (HUMULIN R)      00   :00                           dose, On           Medi

[facility]



     injection                                         Fri            [facility]



     10 Units                                         22 at           



                                                  0100, ASAP           

 

     FENTanyl PF      2022- No             50ug      50 mcg,           Un

[facility]



     (SUBLIMAZE                                Slow IV           ity o

f



     (PF))      05:45: 04:47                          Push,           [place]



     injection      00   :00                           ONCE, 1           Medical



     50 mcg                                         dose, On           [facility]



                                                  Fri            



                                                  22 at           



                                                  0045, STAT           

 

     FENTanyl PF      2022- No             50ug      50 mcg,           Un

[facility]



     (SUBLIMAZE                                Slow IV           ity o

f



     (PF))      04:15: 04:00                          Push,           [place]



     injection      00   :00                           ONCE, 1           Medical



     50 mcg                                         dose, On           [facility]



                                                  Thu            



                                                  22 at           



                                                  2315, STAT           

 

     NaCl 0.9%      2022- No             1000mL      at 999           Uni

vers



     (NS) bolus      6-24 06-24                          mL/hr,           ity of



     infusion      04:15: 06:41                          1,000 mL,           [facility]

as



     1,000 mL      00   :00                           IV             Medical



                                                  Infusion,           [facility]



                                                  ONCE, 1           



                                                  dose, On           



                                                  Thu            



                                                  22 at           



                                                  2315, STAT           

 

     hydromorpho      0      Yes            4ug       Take 4 mcg           

[facility]



     ne HCl      6-10                               by mouth           ity of



     (HYDROMORPH      19:50:                               every 12           [facility]

[facility]



     ONE ORAL)      08                                 (twelve)           Med[facility]

l



                                                  hours.           [place]

 

     magnesium            Yes            400mg      400 mg,           Univ

ers



     oxide      6-10                               Oral,           ity of



     (MAG-OX      14:00:                               DAILY,           [place]



     400) tablet      00                                 First dose           Me

dical



     400 mg                                         on Fri           [place]



                                                  6/10/22 at           



                                                  0900,           



                                                  Until           



                                                  Discontinu           



                                                  ed,            



                                                  Routine           

 

     HYDROmorpho      2022- No             1mg       1 mg, Slow          

 [facility]



     ne        6-10 06-10                          IV Push,           ity of



     (DILAUDID)      05:15: 04:42                          ONCE, 1           [facility]

as



     injection 1      00   :00                           dose, On           Medi

[facility]



     mg                                           Fri            [place]



                                                  6/10/22 at           



                                                  0015,           



                                                  Routine<br           



                                                  >Use           



                                                  approved           



                                                  by             



                                                  (Faculty):           



                                                  ADC            



                                                  PROVIDER           

 

     acetaminoph            Yes            1000mg      1,000 mg,          

 [facility]



     en        6-10                               Oral, Q8H,           ity of



     (TYLENOL)      03:00:                               First dose           [facility]

[facility]



     tablet      00                                 on Thu           Medical



     1,000 mg                                         22 at           [facility]



                                                  2200,           



                                                  Until           



                                                  Discontinu           



                                                  ed,            



                                                  Routine           

 

     insulin            Yes       [id number] 52U       inject 52           U

nivers



     glargine      6-10                               Units           ity of



     100 unit/mL      00:00:                               under the           T

[facility]



     injection      00                                 skin at           Medical



                                                  bedtime.           [facility]

 

     insulin      0      Yes       [id number] 52U       inject 52           U

nivers



     glargine      6-10                               Units           ity of



     100 unit/mL      00:00:                               under the           T

[facility]



     injection      00                                 skin at           Medical



                                                  bedtime.           [facility]

 

     insulin      0      Yes       [id number] 52U       inject 52           U

nivers



     glargine      6-10                               Units           ity of



     100 unit/mL      00:00:                               under the           T

[facility]



     injection      00                                 skin at           Medical



                                                  bedtime.           [facility]

 

     insulin      2022-0      Yes       [id number] 52U       inject 52           U

nivers



     glargine      6-10                               Units           ity of



     100 unit/mL      00:00:                               under the           T

[facility]



     injection      00                                 skin at           Medical



                                                  bedtime.           [facility]

 

     insulin      -0      Yes       [id number] 52U       inject 52           U

nivers



     glargine      6-10                               Units           ity of



     100 unit/mL      00:00:                               under the           T

[facility]



     injection      00                                 skin at           Medical



                                                  bedtime.           [facility]

 

     insulin      -0      Yes       [id number] 52U       inject 52           U

nivers



     glargine      6-10                               Units           ity of



     100 unit/mL      00:00:                               under the           T

[facility]



     injection      00                                 skin at           Medical



                                                  bedtime.           [facility]

 

     insulin      -0      Yes       [id number] 52U       inject 52           U

nivers



     glargine      6-10                               Units           ity of



     100 unit/mL      00:00:                               under the           T

[facility]



     injection      00                                 skin at           Medical



                                                  bedtime.           [facility]

 

     insulin      -0      Yes       [id number] 52U       inject 52           U

nivers



     glargine      6-10                               Units           ity of



     100 unit/mL      00:00:                               under the           T

[facility]



     injection      00                                 skin at           Medical



                                                  bedtime.           [facility]

 

     insulin      -0 - No        [id number] 52U       inject 52           

[facility]



     glargine      6-10 07-30                          Units           ity of



     100 unit/mL      00:00: 00:00                          under the           

[place]



     injection      00   :00                           skin at           Medical



                                                  bedtime.           [facility]

 

     insulin      -0 - No        [id number] 52U       inject 52           

[facility]



     glargine      6-10 07-30                          Units           ity of



     100 unit/mL      00:00: 00:00                          under the           

[place]



     injection      00   :00                           skin at           Medical



                                                  bedtime.           [facility]

 

     Insulin      2022- No        [id number]           Use as           Uni

vers



     Safety      6-10 07-09                          directed           ity of



     [place],      00:00: 04:59                                         [place]



     Disp, 29      00   :00                                          Medical



     gauge x                                                        Branch



     3/16" Ndle                                                        

 

     Insulin      2022- No        [id number]           Use as           Uni

vers



     Safety      6-10 07-09                          directed           ity of



     [place],      00:00: 04:59                                         [place]



     Disp, 29      00   :00                                          Medical



     gauge x                                                        Branch



     3/16" Ndle                                                        

 

     Insulin      2022- No        [id number]           Use as           Uni

vers



     Safety      6-10 07-09                          directed           ity of



     [place],      00:00: 04:59                                         [place]



     Disp, 29      00   :00                                          Medical



     gauge x                                                        Branch



     3/16" Ndle                                                        

 

     Insulin      2022- No        [id number]           Use as           Uni

vers



     Safety      6-10 07-09                          directed           ity of



     [place],      00:00: 04:59                                         [place]



     Disp, 29      00   :00                                          Medical



     gauge x                                                        Branch



     3/16" Ndle                                                        

 

     Insulin      2022- No        [id number]           Use as           Uni

vers



     Safety      6-10 07-09                          directed           ity of



     [place],      00:00: 04:59                                         [place]



     Disp, 29      00   :00                                          Medical



     gauge x                                                        Branch



     3/16" Ndle                                                        

 

     Insulin      2022- No        [id number]           Use as           Uni

vers



     Safety      6-10 07-09                          directed           ity of



     [place],      00:00: 04:59                                         [place]



     Disp, 29      00   :00                                          Medical



     gauge x                                                        Branch



     3/16" Ndle                                                        

 

     Insulin      2022- No        [id number]           Use as           Uni

vers



     Safety      6-10 07-09                          directed           ity of



     [place],      00:00: 04:59                                         [place]



     Disp, 29      00   :00                                          Medical



     gauge x                                                        Branch



     3/16" Ndle                                                        

 

     fluconazole      2022- No        [id number] 200mg      Take 1         

  [facility]



     200 mg      6-10 06-23                          tablet by           ity of



     tablet      00:00: 04:59                          mouth           [place]



               00   :00                           daily for           Medical



                                                  12 days.           Branch

 

     magnesium      2022- No             4g        4 g, IV           Univ

ers



     sulfate in                                Piggyback,           it

y of



     water 4      20:45: 21:58                          ONCE, 1           [place]



     gram/50 mL      00   :00                           dose, On           Medic

al



     (8 %) IV                                         Thu 22           Bran

h



     Piggyback 4                                         at 1545,           



     g                                            Routine           

 

     HYDROmorpho            Yes            4mg       4 mg,           Unive

rs



     ne                                       Oral,           ity of



     (DILAUDID)      19:32:                               Q6HPRN,           [place]

[place]



     tablet 4 mg      47                                 Starting           Medi

sarah



                                                  on u           Branch



                                                  22 at           



                                                  1432,           



                                                  Until           



                                                  Discontinu           



                                                  ed,            



                                                  Routine,           



                                                  Pain           



                                                  (scale           



                                                  7-10)           

 

     insulin            Yes            30U       30 Units,           Unive

rs



     glargine                                     Subcutaneo           ity o

f



     (LANTUS      02:00:                               , [facility],           [place]



     U-100)      00                                 First dose           Medical



     injection                                         on Wed           Branch



     30 Units                                         22 at           



                                                  2100,           



                                                  Until           



                                                  Discontinu           



                                                  ed,            



                                                  Routine           

 

     HYDROmorpho      2022- No             .5mg      0.5 mg,           Un

[facility]



     ne                                  Slow IV           ity of



     (DILAUDID)      16:28: 19:33                          Push,           [place]



     injection      03   :06                           Q4HPRN,           Medical



     0.5 mg                                         Starting           Branch



                                                  on 22 at           



                                                  1128,           



                                                  Until Thu           



                                                  22 at           



                                                  1433,           



                                                  Routine,           



                                                  Pain           



                                                  (scale           



                                                  7-10)<br>U           



                                                  se             



                                                  approved           



                                                  by             



                                                  (Faculty):           



                                                  ADC            



                                                  PROVIDER           

 

     fluconazole            Yes            200mg      200 mg,           Un

[facility]



     (DIFLUCAN)                                     Oral,           ity of



     tablet 200      14:45:                               DAILY,           [place]



     mg        00                                 First dose           Medical



                                                  on Wed           Branch



                                                  22 at           



                                                  0945,           



                                                  Until           



                                                  Discontinu           



                                                  ed,            



                                                  ASAP<br>Re           



                                                  ason for           



                                                  Anti-Infec           



                                                  tive:           



                                                  Empiric           



                                                  Therapy           



                                                  for            



                                                  Suspected           



                                                  Infection<           



                                                  br>Empiric           



                                                  Therapy           



                                                  Site:           



                                                  Urine<br>D           



                                                  uration of           



                                                  therapy:           



                                                  72 hours           

 

     enoxaparin            Yes            40mg      40 mg,           Unive

rs



     (LOVENOX)                                     Subcutaneo           ity 

of



     injection      14:00:                               us, DAILY,           [place]

[place]



     40 mg      00                                 First dose           Medical



                                                  on Wed           Branch



                                                  22 at           



                                                  0900,           



                                                  Until           



                                                  Discontinu           



                                                  ed,            



                                                  Routine           

 

     tamsulosin            Yes            .4mg      0.4 mg,           Univ

ers



     (FLOMAX)                                     Oral,           ity of



     capsule 0.4      14:00:                               [place],           [place]

[place]



     mg        00                                 First dose           Medical



                                                  on Wed           Branch



                                                  22 at           



                                                  0900,           



                                                  Until           



                                                  Discontinu           



                                                  ed,            



                                                  Routine           

 

     insulin       No             10U       10 Units,           Univ

ers



     glargine                                Subcutaneo           ity 

of



     (LANTUS      12:00: 12:51                          us, ONCE,           [place]

[place]



     U-100)      00   :00                           1 dose, On           Medical



     injection                                         22           Bran

ch



     10 Units                                         at 0700,           



                                                  Routine           

 

     HYDROmorpho      2022- No             1mg       1 mg, Slow          

 [facility]



     ne                                  IV Push,           ity of



     (DILAUDID)      10:00: 09:23                          ONCE, 1           Eric

as



     injection 1      00   :00                           dose, On           Medi

sarah



     mg                                           22           Branch



                                                  at 0500,           



                                                  Routine<br           



                                                  >Use           



                                                  approved           



                                                  by             



                                                  (Faculty):           



                                                  ADC            



                                                  PROVIDER           

 

     HYDROmorpho      2022- No             4mg       4 mg,           Univ

ers



     ne                                  Oral,           ity of



     (DILAUDID)      08:40: 16:28                          [id number],           [facility]

[place]



     tablet 4 mg      04   :14                           Starting           Medi

sarah



                                                  on Wed           Branch



                                                  22 at           



                                                  0340,           



                                                  Until 22 at           



                                                  1128,           



                                                  Routine,           



                                                  Pain           



                                                  (scale           



                                                  7-10)           

 

     HYDROmorpho      2022- No             1mg       1 mg, Slow          

 [facility]



     ne                                  IV Push,           ity of



     (DILAUDID)      06:15: 05:41                          ONCE, 1           Eric

as



     injection 1      00   :00                           dose, On           Medi

sarah



     mg                                           Wed 22           Branch



                                                  at 0115,           



                                                  Routine<br           



                                                  >Use           



                                                  approved           



                                                  by             



                                                  (Faculty):           



                                                  ADC            



                                                  PROVIDER           

 

     aztreonam      2022- No             1000mg      1,000 mg,           

[facility]



     (AZACTAM)      6-08 06-10                          IV             ity of



     1,000 mg in      05:30: 16:58                          Piggyback,          

 [place]



     NaCl 0.9%      00   :42                           Q8H ABX,           Medica

l



     (NS) 100 mL                                         First dose           Br

anch



     MINI-BAG                                         on 22 at           



                                                  0030,           



                                                  Until           



                                                  Discontinu           



                                                  ed,            



                                                  Administer           



                                                  over 30           



                                                  Minutes,           



                                                  100            



                                                  mL<br>Reas           



                                                  on for           



                                                  Anti-Infec           



                                                  tive:           



                                                  Empiric           



                                                  Therapy           



                                                  for            



                                                  Suspected           



                                                  Infection<           



                                                  br>Empiric           



                                                  Therapy           



                                                  Site:           



                                                  Urine<br>D           



                                                  uration of           



                                                  therapy: 7           



                                                  days           

 

     Sliding            Yes                      Subcutaneo           [facility]

ers



     Scale      6-08                               us, Q4H,           ity of



     Insulin-Reg      05:00:                               First dose           

[place]



     ular + Fsbg      00                                 on Wed           Medica

l



     Testing                                         22 at           Branch



                                                  0000,           



                                                  Until           



                                                  Discontinu           



                                                  ed,            



                                                  Routine           

 

     NaCl 0.9%      2022- No             1000mL      at 125           Uni

vers



     (NS) IV      08                          mL/hr, IV           ity of



     infusion      04:30: 16:28                          Infusion,           Eric

as



     1,000 mL      00   :27                           CONTINUOUS           Medic

al



                                                  , Starting           Branch



                                                  on 22 at           



                                                  2330,           



                                                  Until 22 at           



                                                  1128,           



                                                  Routine           

 

     glucagon            Yes            1mg       1 mg,           [facility]



     (GLUCAGEN                                     Intravenou           ity 

of



     DIAGNOSTIC      04:10:                               s, PRN -           Eric

as



     KIT)      53                                 SEE            Medical



     injection 1                                         INSTRUCTIO           Br

anch



     mg                                           NS,            



                                                  Starting           



                                                  on 22 at           



                                                  2310,           



                                                  Until           



                                                  Discontinu           



                                                  ed,            



                                                  Routine,           



                                                  For Blood           



                                                  glucose <           



                                                  70             

 

     proCHLORper            Yes            10mg      10 mg,           Univ

ers



     azine                                     Slow IV           ity of



     (COMPAZINE)      04:10:                               Push,           [place]



     injection      40                                 Q6HPRN,           Medical



     10 mg                                         Starting           Branch



                                                  on 22 at           



                                                  2310,           



                                                  Until           



                                                  Discontinu           



                                                  ed,            



                                                  Routine,           



                                                  Nausea and           



                                                  Vomiting           



                                                  (N/V)           

 

     FENTanyl PF      2022- No             50ug      50 mcg,           Un

[facility]



     (SUBLIMAZE                                Slow IV           ity o

f



     (PF))      03:04: 03:37                          Push,           [place]



     injection      00   :00                           ONCE, 1           Medical



     50 mcg                                         dose, On           Branch



                                                  22           



                                                  at 2215,           



                                                  STAT           

 

     NaCl 0.9%      2022- No             1000mL      at 999           Uni

vers



     (NS) IV       06-08                          mL/hr,           ity of



     infusion      01:16: 01:54                          Intravenou           Te

[place]



     1,000 mL      00   :00                           s, ONCE, 1           Medic

al



                                                  dose, On           Branch



                                                  22           



                                                  at 2030,           



                                                  Routine           

 

     insulin      2022- No             .1U/kg      13.7 Units           U

nivers



     regular      08                          (rounded           ity of



     human      01:15: 01:51                          from 13.74           [place]



     (HUMULIN R)      00   :00                           Units =           Medic

al



     injection                                         0.1            Branch



     13.7 Units                                         Units/kg           



                                                  ?137.4           



                                                  kg), Slow           



                                                  IV Push,           



                                                  ONCE, 1           



                                                  dose, On           



                                                  22           



                                                  at 2030,           



                                                  STAT           

 

     FENTanyl PF      2022- No             50ug      50 mcg,           Un

yoselyn



     (SUBLIMAZE      -08                          Slow IV           ity o

f



     (PF))      01:00: 01:52                          Push,           [place]



     injection      00   :00                           ONCE, 1           Medical



     50 mcg                                         dose, On           Branch



                                                  22           



                                                  at 2015,           



                                                  ASAP           

 

     NaCl 0.9%      2022- No             1000mL      at 999           Uni

vers



     (NS) IV       06-08                          mL/hr,           ity of



     infusion      23:47: 00:53                          Intravenou           Te

[place]



     1,000 mL      00   :00                           s, ONCE, 1           Medic

al



                                                  dose, On           Branch



                                                  22           



                                                  at 1900,           



                                                  ASAP           

 

     hydromorpho      -0      Yes            4ug       Take 4 mcg           

[facility]



     ne HCl      6-07                               by mouth           ity of



     (HYDROMORPH      23:17:                               every 12           Te

xas



     ONE ORAL)      17                                 (twelve)           Medica

l



                                                  hours.           Branch

 

     hydromorpho      0      Yes            4ug       Take 4 mcg           

[facility]



     ne HCl      6-06                               by mouth           ity of



     (HYDROMORPH      18:29:                               every 12           [facility]

[facility]



     ONE ORAL)      31                                 (twelve)           [facility]

l



                                                  hours.           Branch

 

     insulin      0      Yes        15U       inject 15           U

nivers



     lispro,      6-06                               Units           ity of



     human, 100      00:00:                               under the           [facility]

[facility]



     unit/mL      00                                 skin 3           Medical



     injection                                         (three)           Branch



                                                  times           



                                                  daily           



                                                  before           



                                                  meals.           

 

     insulin      0      Yes        15U       inject 15           U

nivers



     lispro,      6-06                               Units           ity of



     human, 100      00:00:                               under the           [facility]

[facility]



     unit/mL      00                                 skin 3           Medical



     injection                                         (three)           Branch



                                                  times           



                                                  daily           



                                                  before           



                                                  meals.           

 

     insulin      -0      Yes        15U       inject 15           U

nivers



     lispro,      6-06                               Units           ity of



     human, 100      00:00:                               under the           [facility]

[facility]



     unit/mL      00                                 skin 3           Medical



     injection                                         (three)           Branch



                                                  times           



                                                  daily           



                                                  before           



                                                  meals.           

 

     insulin      -0      Yes        15U       inject 15           U

nivers



     lispro,      6-06                               Units           ity of



     human, 100      00:00:                               under the           [facility]

[facility]



     unit/mL      00                                 skin 3           Medical



     injection                                         (three)           Branch



                                                  times           



                                                  daily           



                                                  before           



                                                  meals.           

 

     insulin      0      Yes        15U       inject 15           U

nivers



     lispro,      6-06                               Units           ity of



     human, 100      00:00:                               under the           [facility]

[facility]



     unit/mL      00                                 skin 3           Medical



     injection                                         (three)           Branch



                                                  times           



                                                  daily           



                                                  before           



                                                  meals.           

 

     insulin      -0      Yes        15U       inject 15           U

nivers



     lispro,      6-06                               Units           ity of



     human, 100      00:00:                               under the           Te

[facility]



     unit/mL      00                                 skin 3           Medical



     injection                                         (three)           Branch



                                                  times           



                                                  daily           



                                                  before           



                                                  meals.           

 

     insulin      -0      Yes        52U       inject 52           U

nivers



     glargine      6-06                               Units           ity of



     100 unit/mL      00:00:                               under the           [facility]

[facility]



     injection      00                                 skin at           Medical



                                                  bedtime.           Branch

 

     insulin            Yes       [id number] 15U       inject 15           U

nivers



     lispro,      6-06                               Units           ity of



     human, 100      00:00:                               under the           [facility]

[facility]



     unit/mL      00                                 skin 3           Medical



     injection                                         (three)           Branch



                                                  times           



                                                  daily           



                                                  before           



                                                  meals.           

 

     insulin            Yes       [id number] 52U       inject 52           U

nivers



     glargine      6-06                               Units           ity of



     100 unit/mL      00:00:                               under the           T

[facility]



     injection      00                                 skin at           Medical



                                                  bedtime.           Branch

 

     insulin            Yes       [id number] 15U       inject 15           U

nivers



     lispro,      6-06                               Units           ity of



     human, 100      00:00:                               under the           [facility]

[facility]



     unit/mL      00                                 skin 3           Medical



     injection                                         (three)           Branch



                                                  times           



                                                  daily           



                                                  before           



                                                  meals.           

 

     insulin            Yes       [id number] 15U       inject 15           U

nivers



     lispro,      6-06                               Units           ity of



     human, 100      00:00:                               under the           Te

[facility]



     unit/mL      00                                 skin 3           Medical



     injection                                         (three)           Branch



                                                  times           



                                                  daily           



                                                  before           



                                                  meals.           

 

     insulin            Yes       [id number] 15U       inject 15           U

nivers



     lispro,      6-06                               Units           ity of



     human, 100      00:00:                               under the           [facility]

[facility]



     unit/mL      00                                 skin 3           Medical



     injection                                         (three)           Branch



                                                  times           



                                                  daily           



                                                  before           



                                                  meals.           

 

     insulin      2022- No        [id number] 15U       inject 15           

[facility]



     lispro,      6-06 07-30                          Units           ity of



     human, 100      00:00: 00:00                          under the           [facility]

[facility]



     unit/mL      00   :00                           skin 3           Medical



     injection                                         (three)           Branch



                                                  times           



                                                  daily           



                                                  before           



                                                  meals.           

 

     insulin      2022- No        [id number] 15U       inject 15           

[facility]



     lispro,      6-06 07-30                          Units           ity of



     human, 100      00:00: 00:00                          under the           T

[facility]



     unit/mL      00   :00                           skin 3           Medical



     injection                                         (three)           Branch



                                                  times           



                                                  daily           



                                                  before           



                                                  meals.           

 

     insulin      2022- No        [id number] 52U       inject 52           

[facility]



     glargine      6-06 06-10                          Units           ity of



     100 unit/mL      00:00: 00:00                          under the           

[place]



     injection      00   :00                           skin at           Medical



                                                  bedtime.           Branch

 

     HYDROmorpho      2022- No             .5mg      0.5 mg,           Un

[facility]



     ne         06-06                          Slow IV           ity of



     (DILAUDID)      18:15: 18:14                          Push,           [place]



     injection      00   :00                           Q8HPRN,           Medical



     0.5 mg                                         Starting           Branch



                                                  on Sun           



                                                  22 at           



                                                  1315,           



                                                  Until Mon           



                                                  22 at           



                                                  1314,           



                                                  Routine,           



                                                  Pain           



                                                  (scale           



                                                  7-10)<br>U           



                                                  se             



                                                  approved           



                                                  by             



                                                  (Faculty):           



                                                  [facility]            



                                                  PROVIDER           

 

     insulin      0      Yes            .4U/kg/      52 Units           Uni

vers



     glargine      605                     d         (rounded           ity of



     (LANTUS      14:43:                               from 51.6           [place]



     U-100)      43                                 Units =           Medical



     injection                                         0.4            Branch



     52 Units                                         Units/kg/d           



                                                  ay ?129           



                                                  kg),           



                                                  Subcutaneo           



                                                  us, Q24H,           



                                                  First dose           



                                                  on Sun           



                                                  22 at           



                                                  0944,           



                                                  Until           



                                                  Discontinu           



                                                  ed,            



                                                  Routine           

 

     HYDROmorpho            Yes            4mg       4 mg,           Unive

rs



     ne        -05                               Oral,           ity of



     (DILAUDID)      02:21:                               Q6HPRN,           [place]

[facility]



     tablet 4 mg      55                                 Starting           Medi

[facility]



                                                  on Sat           Branch



                                                  22 at           



                                                  2121,           



                                                  Until           



                                                  Discontinu           



                                                  ed,            



                                                  Routine,           



                                                  Pain           



                                                  (scale           



                                                  4-6)           

 

     HYDROmorpho      -0 - No             1mg       1 mg, Slow          

 [facility]



     ne         06-05                          IV Push,           ity of



     (DILAUDID)      02:21: 18:05                          Q4HPRN,           [facility]

as



     injection 1      39   :59                           Starting           Medi

[facility]



     mg                                           on Sat           Branch



                                                  22 at           



                                                  2121,           



                                                  Until Sun           



                                                  22 at           



                                                  1305,           



                                                  Routine,           



                                                  Pain           



                                                  (scale           



                                                  7-10)<br>U           



                                                  se             



                                                  approved           



                                                  by             



                                                  (Faculty):           



                                                  [facility]            



                                                  PROVIDER           

 

     Sliding            Yes                      Subcutaneo           Univ

ers



     Scale      6-04                               us, Q4H,           ity of



     Insulin -      17:00:                               First dose           Te

[facility]



     lispro      00                                 on Sat           Medical



     (humaLOG) +                                         22 at           [facility]



     Fsbg                                         1200,           



     Testing                                         Until           



                                                  Discontinu           



                                                  ed,            



                                                  Routine           

 

     insulin            Yes            .35U/kg      15 Units           Uni

vers



     lispro      6-04                     /d        (rounded           ity of



     (human)      17:00:                               from 15.05           [place]

[facility]



     (HumaLOG      00                                 Units =           Medical



     U-100)                                         0.35           Branch



     injection                                         Units/kg/d           



     15 Units                                         ay ?129           



                                                  kg),           



                                                  Subcutaneo           



                                                  us, TID           



                                                  MEALS,           



                                                  First dose           



                                                  on Sat           



                                                  22 at           



                                                  1200,           



                                                  Until           



                                                  Discontinu           



                                                  ed,            



                                                  Routine           

 

     proMETHazin            Yes            25mg      25 mg,           Univ

ers



     e                                        Oral,           ity of



     (PHENERGAN)      15:09:                               Q6HPRN,           Eric

as



     tablet 25      59                                 Starting           Medica

l



     mg                                           on Sat           Branch



                                                  22 at           



                                                  1009,           



                                                  Until           



                                                  Discontinu           



                                                  ed,            



                                                  Routine,           



                                                  Nausea and           



                                                  Vomiting           



                                                  (N/V)           

 

     HYDROmorpho      2022- No             4mg       4 mg,           Univ

ers



     ne        05                          Oral,           ity of



     (DILAUDID)      14:47: 02:22                          Q6HPRN,           [facility]

as



     tablet 4 mg      19   :08                           Starting           Medi

[facility]



                                                  on Sat           Branch



                                                  22 at           



                                                  0947,           



                                                  Until Sat           



                                                  22 at           



                                                  2122,           



                                                  Routine,           



                                                  Pain           



                                                  (scale           



                                                  7-10)           

 

     potassium      2022- No             20meq      20 mEq, IV           

[facility]



     chloride 20      04                          Piggyback,           i

ty of



     mEq/100 mL      23:45: 03:11                          Q2H, 2           [place]

[facility]



     (KCL) 20      00   :00                           doses,           Medical



     mEq/100 mL                                         First dose           Bra

[facility]



     RTU IVPB 20                                         on Fri           



     mEq                                          6/3/22 at           



                                                  1845, Last           



                                                  dose on           



                                                  Fri 6/3/22           



                                                  at 2000,           



                                                  100 mL           

 

     HYDROmorpho      2022- No             1mg       1 mg, Slow          

 [facility]



     ne        04                          IV Push,           ity of



     (DILAUDID)      18:08: 14:45                          Q4HPRN,           [facility]

as



     injection 1      31   :34                           Starting           Medi

[facility]



     mg                                           on Fri           Branch



                                                  6/3/22 at           



                                                  1308,           



                                                  Until Sat           



                                                  22 at           



                                                  0945,           



                                                  Routine,           



                                                  Pain           



                                                  (scale           



                                                  7-10)<br>U           



                                                  se             



                                                  approved           



                                                  by             



                                                  (Faculty):           



                                                  [facility]            



                                                  PROVIDER           

 

     enoxaparin            Yes            40mg      40 mg,           Unive

rs



     (LOVENOX)                                     Subcutaneo           ity 

of



     injection      14:00:                               us, DAILY,           Te

[place]



     40 mg      00                                 First dose           Medical



                                                  on Fri           Branch



                                                  6/3/22 at           



                                                  0900,           



                                                  Until           



                                                  Discontinu           



                                                  ed,            



                                                  Routine           

 

     lactobacill            Yes            .5mg      0.5 mg,           Uni

vers



     us                                       Oral, BID,           ity of



     acidophilus      13:00:                               First dose           

[place]



     tablet 0.5      00                                 on Fri           Medical



     mg                                           6/3/22 at           Branch



                                                  0800,           



                                                  Until           



                                                  Discontinu           



                                                  ed,            



                                                  Routine           

 

     docusate            Yes            100mg      100 mg,           Unive

rs



     (COLACE)                                     Oral, BID,           ity o

f



     capsule 100      13:00:                               First dose           

[place]



     mg        00                                 on Fri           Medical



                                                  6/3/22 at           Branch



                                                  0800,           



                                                  Until           



                                                  Discontinu           



                                                  ed,            



                                                  Routine           

 

     cefTRIAXone       No             1000mg      1,000 mg,         

  [facility]



     (ROCEPHIN)                                IV             ity of



     1,000 mg in      11:30: 16:04                          Piggyback,          

 [place]



     NaCl 0.9%      00   :40                           Q24H ABX,           Medic

al



     (NS) 50 mL                                         First dose           Bra

nch



     MINI-BAG                                         on Fri           



                                                  6/3/22 at           



                                                  0630,           



                                                  Until           



                                                  Discontinu           



                                                  ed,            



                                                  Administer           



                                                  over 30           



                                                  Minutes,           



                                                  50             



                                                  mL<br>Reas           



                                                  on for           



                                                  Anti-Infec           



                                                  tive:           



                                                  Documented           



                                                  Infection<           



                                                  br>Documen           



                                                  huma            



                                                  Infection           



                                                  Site:           



                                                  Urine<br>D           



                                                  uration of           



                                                  Therapy: 7           



                                                  days           

 

     proMETHazin       No             25mg      25 mg, IV           

[facility]



     e         04                          Piggyback,           ity of



     (PHENERGAN)      11:22: 15:10                          Q6HPRN,           [facility]

[place]



     25 mg in      38   :15                           Starting           Medical



     NaCl 0.9%                                         on Fri           Branch



     (NS) 50 mL                                         6/3/22 at           



     IV                                           0622,           



     piggyback                                         Until Sat           



                                                  22 at           



                                                  1010,           



                                                  Routine,           



                                                  Nausea and           



                                                  Vomiting           



                                                  (N/V)           

 

     acetaminoph            Yes            650mg      650 mg,           Un

yoselyn



     en                                       Oral,           ity of



     (TYLENOL)      11:04:                               Q6HPRN,           [place]



     tablet 650      03                                 Starting           Medic

al



     mg                                           on Fri           Branch



                                                  6/3/22 at           



                                                  0604,           



                                                  Until           



                                                  Discontinu           



                                                  ed,            



                                                  Routine,           



                                                  Pain           



                                                  (scale           



                                                  1-3), Temp           



                                                  > 38.5 C           

 

     HYDROmorpho       No             2mg       2 mg, Slow          

 [facility]



     ne                                  IV Push,           ity of



     (DILAUDID)      11:03: 18:08                          Q4HPRN,           Eric

as



     injection 2      22   :44                           Starting           Medi

sarah



     mg                                           on Fri           Branch



                                                  6/3/22 at           



                                                  0603,           



                                                  Until Fri           



                                                  6/3/22 at           



                                                  1308,           



                                                  Routine,           



                                                  Pain           



                                                  (scale           



                                                  7-10)<br>U           



                                                  se             



                                                  approved           



                                                  by             



                                                  (Faculty):           



                                                  CLC            



                                                  PROVIDER           

 

     NaCl 0.9%            Yes            10mL      10 mL,           Univer

s



     (NS)                                     Slow IV           ity of



     injection      10:58:                               Push, PRN,           Te

[place]



     10 mL      34                                 Starting           Medical



                                                  on Fri           Branch



                                                  6/3/22 at           



                                                  0558,           



                                                  Until           



                                                  Discontinu           



                                                  ed,            



                                                  Routine,           



                                                  line           



                                                  maintenanc           



                                                  e              

 

     NaCl 0.45%      2022- No             1000mL                     Univ

ers



     (1/2NS)                                               ity of



     1000 mL +      10:30: 14:45                                         [place]



     KCL 20 mEq      00   :34                                          Medical



                                                                 Branch

 

     D5W 0.45%                             IV             [facility]



     NaCl                                Infusion,           ity of



     (1/2NS) 1 L      10:21: 14:45                          at 200           Eric

as



     + KCL 20      33   :34                           mL/hr, PRN           Medic

al



     mEq                                          - SEE           Branch



                                                  INSTRUCTIO           



                                                  NS,            



                                                  Starting           



                                                  on Fri           



                                                  6/3/22 at           



                                                  0521,           



                                                  Until Sat           



                                                  22 at           



                                                  0945,           



                                                  ASAP,           



                                                  Blood           



                                                  glucose           



                                                  control           

 

     acetaminoph      2022- No             1000mg      1,000 mg,         

  [facility]



     en                                  Oral,           ity of



     (TYLENOL)      08:45: 07:36                          ONCE, 1           [place]

[place]



     tablet      00   :00                           dose, On           Medical



     1,000 mg                                         Fri 6/3/22           Branc

h



                                                  at 0345,           



                                                  ASAP           

 

     insulin      2022- No             8U        8 Units,           Unive

rs



     regular                                Slow IV           ity of



     human      08:15: 07:13                          Push,           [place]



     (HUMULIN R)      00   :00                           ONCE, 1           Medic

al



     injection 8                                         dose, On           Bran

ch



     Units                                         Fri 6/3/22           



                                                  at 0315,           



                                                  Routine           

 

     NaCl 0.9%      2022- No             1000mL      at 999           Uni

vers



     (NS) bolus                                mL/hr,           ity of



     infusion      07:15: 06:12                          1,000 mL,           Eric

as



     1,000 mL      00   :00                           IV             Medical



                                                  Infusion,           Branch



                                                  ONCE, 1           



                                                  dose, On           



                                                  Fri 6/3/22           



                                                  at 0215,           



                                                  STAT           

 

     insulin       No             8U        8 Units,           Unive

rs



     regular                                Slow IV           ity of



     human      07:15: 06:21                          Push,           [place]



     (HUMULIN R)      00   :00                           ONCE, 1           Medic

al



     injection 8                                         dose, On           Bran

ch



     Units                                         Fri 6/3/22           



                                                  at 0215,           



                                                  STAT           

 

     iopamidol      2022- No        [id number] 100mL      100 mL,          

 [facility]



     (ISOVUE                                Intravenou           ity o

f



     370-500 mL)      05:45: 04:31                          s, ONCE, 1          

 [place]



     injection      00   :00                           dose, On           Medica

l



     100 mL                                         Fri 6/3/22           Branch



                                                  at 0045,           



                                                  Routine           

 

     meropenem      2022- No             500mg      500 mg, IV           

[facility]



     (MERREM)                                Piggyback,           ity 

of



     500 mg in      05:00: 05:32                          ONCE, 1           [place]

[place]



     NaCl 0.9%      00   :00                           dose, On           Medica

l



     (NS) 50 mL                                         Fri 6/3/22           Bra

[facility]



     MINI-BAG                                         at 0000,           



                                                  Administer           



                                                  over 30           



                                                  Minutes,           



                                                  50             



                                                  mL<br&gt;R           



                                                  estricted           



                                                  use            



                                                  approved           



                                                  by:            



                                                  EMERGENCY           



                                                  DEPARTMENT           



                                                  PRESCRIBER           



                                                  <br>Reason           



                                                  for            



                                                  Anti-Infec           



                                                  tive:           



                                                  Empiric           



                                                  Therapy           



                                                  for            



                                                  Suspected           



                                                  Infection<           



                                                  br>Empiric           



                                                  Therapy           



                                                  Site:           



                                                  Abdominal<           



                                                  br>Duratio           



                                                  n of           



                                                  therapy:           



                                                  72 hours           

 

     proMETHazin      2022- No             25mg      25 mg, IV           

[facility]



     e                                   Piggyback,           ity of



     (PHENERGAN)      04:45: 03:49                          ONCE, 1           Te

[place]



     25 mg in      00   :00                           dose, On           Medical



     NaCl 0.9%                                         Thu 22           Bran

ch



     (NS) 50 mL                                         at 2345,           



     IV                                           ASAP           



     piggyback                                                        

 

     NaCl 0.9%      2022- No             1000mL      at 999           Uni

vers



     (NS) bolus                                mL/hr,           ity of



     infusion      04:30: 06:04                          1,000 mL,           Eric

as



     1,000 mL      00   :00                           IV             Medical



                                                  Infusion,           Branch



                                                  ONCE, 1           



                                                  dose, On           



                                                  Thu 22           



                                                  at 2330,           



                                                  STAT           

 

     FENTanyl PF      2022- No             100ug      100 mcg,           

[facility]



     (SUBLIMAZE                                Slow IV           ity o

f



     (PF))      03:30: 03:24                          Push,           [place]



     injection      00   :00                           ONCE, 1           Medical



     100 mcg                                         dose, On           Branch



                                                  Thu 22           



                                                  at 2230,           



                                                  STAT           

 

     cefTRIAXone            Yes            2000mg      2,000 mg,          

 [facility]



     (ROCEPHIN)      5-17                               Intramuscu           ity

 of



     injection      21:00:                               lar, Q24H,           Te

[place]



     2,000 mg      00                                 First dose           Medic

al



                                                  on Tu           Branch



                                                  22 at           



                                                  1600,           



                                                  Until           



                                                  Discontinu           



                                                  ed,            



                                                  ASAP<br>Re           



                                                  ason for           



                                                  Anti-Infec           



                                                  tive:           



                                                  Documented           



                                                  Infection<           



                                                  br>Documen           



                                                  huma            



                                                  Infection           



                                                  Site: Skin           



                                                  / Soft           



                                                  Tissue<br>           



                                                  Duration           



                                                  of             



                                                  Therapy:           



                                                  Other (see           



                                                  Comments)           

 

     hydromorpho            Yes            4ug       Take 4 mcg           

[facility]



     ne HCl      5-17                               by mouth           ity of



     (HYDROMORPH      17:43:                               every 12           Te

xas



     ONE ORAL)      55                                 (twelve)           Medica

l



                                                  hours.           Branch

 

     hydromorpho            Yes            4ug       Take 4 mcg           

[facility]



     ne HCl      5-17                               by mouth           ity of



     (HYDROMORPH      17:43:                               every 12           Te

xas



     ONE ORAL)      55                                 (twelve)           Medica

l



                                                  hours.           [place]

 

     collagenase            Yes       [id number]           Apply to         

  [facility]



     250       5-17                               affected           ity of



     unit/gram      00:00:                               area(s)           [place]



     ointment      00                                 daily.           Medical



                                                                 Branch

 

     collagenase            Yes       [id number]           Apply to         

  [facility]



     250       5-17                               affected           ity of



     unit/gram      00:00:                               area(s)           [place]



     ointment      00                                 daily.           Medical



                                                                 Branch

 

     collagenase      -0      Yes       [id number]           Apply to         

  [facility]



     250       5-17                               affected           ity of



     unit/gram      00:00:                               area(s)           [place]



     ointment      00                                 daily.           Medical



                                                                 Branch

 

     collagenase      0      Yes       [id number]           Apply to         

  [facility]



     250       5-17                               affected           ity of



     unit/gram      00:00:                               area(s)           [place]



     ointment      00                                 daily.           [facility]



                                                                 Branch

 

     collagenase      -0      Yes       [id number]           Apply to         

  Univers



     250       5-17                               affected           ity of



     unit/gram      00:00:                               area(s)           [place]



     ointment      00                                 daily.           [facility]

 

     collagenase      -0      Yes       [id number]           Apply to         

  [facility]



     250       5-17                               affected           ity of



     unit/gram      00:00:                               area(s)           [place]



     ointment      00                                 daily.           [facility]



                                                                 Branch

 

     collagenase      -      Yes       [id number]           Apply to         

  Univers



     250       5-17                               affected           ity of



     unit/gram      00:00:                               area(s)           [place]



     ointment      00                                 daily.           Medical



                                                                 Branch

 

     collagenase      -0      Yes       [id number]           Apply to         

  [facility]



     250       5-17                               affected           ity of



     unit/gram      00:00:                               area(s)           [place]



     ointment      00                                 daily.           Medical



                                                                 Branch

 

     collagenase      -0      Yes       [id number]           Apply to         

  [facility]



     250       5-17                               affected           ity of



     unit/gram      00:00:                               area(s)           [place]



     ointment      00                                 daily.           Medical



                                                                 Branch

 

     collagenase      -0      Yes       [id number]           Apply to         

  [facility]



     250       5-17                               affected           ity of



     unit/gram      00:00:                               area(s)           [place]



     ointment      00                                 daily.           Medical



                                                                 Branch

 

     collagenase      -0      Yes       [id number]           Apply to         

  [facility]



     250       5-17                               affected           ity of



     unit/gram      00:00:                               area(s)           [place]



     ointment      00                                 daily.           Medical



                                                                 Branch

 

     collagenase      -0      Yes       [id number]           Apply to         

  [facility]



     250       5-17                               affected           ity of



     unit/gram      00:00:                               area(s)           [place]



     ointment      00                                 daily.           Medical



                                                                 Branch

 

     collagenase      -0      Yes       [id number]           Apply to         

  Univers



     250       5-17                               affected           ity of



     unit/gram      00:00:                               area(s)           [place]



     ointment      00                                 daily.           [facility]



                                                                 [facility]

 

     collagenase      -0      Yes       [id number]           [facility]         

  [facility]



     250       5-17                               affected           ity of



     unit/gram      00:00:                               area(s)           [place]



     ointment      00                                 daily.           Medical



                                                                 [facility]

 

     collagenase      -0      Yes       [id number]           Apply to         

  [facility]



     250       5-17                               affected           ity of



     unit/gram      00:00:                               area(s)           [place]



     ointment      00                                 daily.           Medical



                                                                 [facility]

 

     collagenase      -0      Yes       [id number]           Apply to         

  [facility]



     250       5-17                               affected           ity of



     unit/gram      00:00:                               area(s)           [place]



     ointment      00                                 daily.           Medical



                                                                 [facility]

 

     collagenase      -0      Yes       [id number]           Apply to         

  [facility]



     250       5-17                               affected           ity of



     unit/gram      00:00:                               area(s)           [place]



     ointment      00                                 daily.           Medical



                                                                 [facility]

 

     collagenase      -0      Yes       [id number]           Apply to         

  [facility]



     250       5-17                               affected           ity of



     unit/gram      00:00:                               area(s)           [place]



     ointment      00                                 daily.           [facility]



                                                                 [facility]

 

     collagenase      -0      Yes       [id number]           Apply to         

  [facility]



     250       5-17                               affected           ity of



     unit/gram      00:00:                               area(s)           [place]



     ointment      00                                 daily.           Medical



                                                                 [facility]

 

     collagenase      -0      Yes       [id number]           Apply to         

  [facility]



     250       5-17                               affected           ity of



     unit/gram      00:00:                               area(s)           [place]



     ointment      00                                 daily.           [facility]



                                                                 [facility]

 

     collagenase      -0      Yes       [id number]           Apply to         

  [facility]



     250       5-17                               affected           ity of



     unit/gram      00:00:                               area(s)           [place]



     ointment      00                                 daily.           [facility]



                                                                 [facility]

 

     collagenase      -0      Yes       [id number]           Apply to         

  [facility]



     250       5-17                               affected           ity of



     unit/gram      00:00:                               area(s)           [place]



     ointment      00                                 daily.           [facility]



                                                                 [facility]

 

     collagenase      -0      Yes       [id number]           Apply to         

  [facility]



     250       5-17                               affected           ity of



     unit/gram      00:00:                               area(s)           [place]



     ointment      00                                 daily.           Medical



                                                                 [facility]

 

     collagenase      -0      Yes       [id number]           Apply to         

  [facility]



     250       5-17                               affected           ity of



     unit/gram      00:00:                               area(s)           [place]



     ointment      00                                 daily.           [facility]



                                                                 [facility]

 

     collagenase      -0      Yes       [id number]           Apply to         

  [facility]



     250       5-17                               affected           ity of



     unit/gram      00:00:                               area(s)           [place]



     ointment      00                                 daily.           [facility]



                                                                 [facility]

 

     collagenase      -0      Yes       [id number]           Apply to         

  [facility]



     250       5-17                               affected           ity of



     unit/gram      00:00:                               area(s)           [place]



     ointment      00                                 daily.           [facility]



                                                                 [facility]

 

     collagenase      -0      Yes       [id number]           Apply to         

  [facility]



     250       5-17                               affected           ity of



     unit/gram      00:00:                               area(s)           [place]



     ointment      00                                 daily.           [facility]



                                                                 [facility]

 

     collagenase      -0      Yes       [id number]           Apply to         

  [facility]



     250       5-17                               affected           ity of



     unit/gram      00:00:                               area(s)           [place]



     ointment      00                                 daily.           [facility]



                                                                 [facility]

 

     collagenase      -0      Yes       [id number]           Apply to         

  [facility]



     250       5-17                               affected           ity of



     unit/gram      00:00:                               area(s)           [place]



     ointment      00                                 daily.           [facility]



                                                                 [facility]

 

     collagenase      -0      Yes       [id number]           Apply to         

  [facility]



     250       5-17                               affected           ity of



     unit/gram      00:00:                               area(s)           [place]



     ointment      00                                 daily.           Medical



                                                                 [facility]

 

     collagenase      -0      Yes       [id number]           Apply to         

  [facility]



     250       5-17                               affected           ity of



     unit/gram      00:00:                               area(s)           [place]



     ointment      00                                 daily.           [facility]



                                                                 [facility]

 

     collagenase      -0      Yes       [id number]           Apply to         

  [facility]



     250       5-17                               affected           ity of



     unit/gram      00:00:                               area(s)           [place]



     ointment      00                                 daily.           [facility]



                                                                 [facility]

 

     collagenase      -0      Yes       [id number]           Apply to         

  [facility]



     250       5-17                               affected           ity of



     unit/gram      00:00:                               area(s)           [place]



     ointment      00                                 daily.           [facility]



                                                                 [facility]

 

     collagenase      -0      Yes       [id number]           Apply to         

  [facility]



     250       5-17                               affected           ity of



     unit/gram      00:00:                               area(s)           [place]



     ointment      00                                 daily.           [facility]



                                                                 [facility]

 

     collagenase      -0      Yes       [id number]           Apply to         

  [facility]



     250       5-17                               affected           ity of



     unit/gram      00:00:                               area(s)           [place]



     ointment      00                                 daily.           [facility]



                                                                 [facility]

 

     collagenase      -0      Yes       [id number]           Apply to         

  [facility]



     250       5-17                               affected           ity of



     unit/gram      00:00:                               area(s)           [place]



     ointment      00                                 daily.           [facility]



                                                                 [facility]

 

     collagenase      -0      Yes       [id number]           Apply to         

  [facility]



     250       5-17                               affected           ity of



     unit/gram      00:00:                               area(s)           [place]



     ointment      00                                 daily.           [facility]



                                                                 [facility]

 

     collagenase      -0      Yes       [id number]           Apply to         

  [facility]



     250       5-17                               affected           ity of



     unit/gram      00:00:                               area(s)           [place]



     ointment      00                                 daily.           [facility]



                                                                 [facility]

 

     collagenase      -0      Yes       [id number]           Apply to         

  [facility]



     250       5-17                               affected           ity of



     unit/gram      00:00:                               area(s)           [place]



     ointment      00                                 daily.           [facility]



                                                                 [facility]

 

     collagenase      -0      Yes       [id number]           Apply to         

  [facility]



     250       5-17                               affected           ity of



     unit/gram      00:00:                               area(s)           [place]



     ointment      00                                 daily.           [facility]



                                                                 [facility]

 

     collagenase      -0      Yes       [id number]           Apply to         

  [facility]



     250       5-17                               affected           ity of



     unit/gram      00:00:                               area(s)           [place]



     ointment      00                                 daily.           [facility]



                                                                 [facility]

 

     collagenase      -0      Yes       [id number]           Apply to         

  [facility]



     250       5-17                               affected           ity of



     unit/gram      00:00:                               area(s)           [place]



     ointment      00                                 daily.           [facility]



                                                                 [facility]

 

     collagenase      -0      Yes       [id number]           Apply to         

  [facility]



     250       5-17                               affected           ity of



     unit/gram      00:00:                               area(s)           [place]



     ointment      00                                 daily.           [facility]



                                                                 [facility]

 

     collagenase      -0      Yes       [id number]           Apply to         

  [facility]



     250       5-17                               affected           ity of



     unit/gram      00:00:                               area(s)           [place]



     ointment      00                                 daily.           [facility]



                                                                 [facility]

 

     collagenase      -0      Yes       [id number]           Apply to         

  [facility]



     250       5-17                               affected           ity of



     unit/gram      00:00:                               area(s)           [place]



     ointment      00                                 daily.           [facility]



                                                                 [facility]

 

     collagenase      -0      Yes       [id number]           Apply to         

  [facility]



     250       5-17                               affected           ity of



     unit/gram      00:00:                               area(s)           [place]



     ointment      00                                 daily.           [facility]



                                                                 [facility]

 

     collagenase      -0      Yes       [id number]           Apply to         

  [facility]



     250       5-17                               affected           ity of



     unit/gram      00:00:                               area(s)           [place]



     ointment      00                                 daily.           [facility]



                                                                 [facility]

 

     collagenase      -0      Yes       [id number]           Apply to         

  [facility]



     250       5-17                               affected           ity of



     unit/gram      00:00:                               area(s)           [place]



     ointment      00                                 daily.           [facility]



                                                                 [facility]

 

     collagenase      -0      Yes       [id number]           Apply to         

  [facility]



     250       5-17                               affected           ity of



     unit/gram      00:00:                               area(s)           [place]



     ointment      00                                 daily.           [facility]



                                                                 [facility]

 

     collagenase      -0      Yes       [id number]           Apply to         

  [facility]



     250       5-17                               affected           ity of



     unit/gram      00:00:                               area(s)           [place]



     ointment      00                                 daily.           [facility]



                                                                 [facility]

 

     collagenase      -0      Yes       [id number]           Apply to         

  [facility]



     250       5-17                               affected           ity of



     unit/gram      00:00:                               area(s)           [place]



     ointment      00                                 daily.           [facility]



                                                                 [facility]

 

     collagenase      -0      Yes       [id number]           Apply to         

  [facility]



     250       5-17                               affected           ity of



     unit/gram      00:00:                               area(s)           [place]



     ointment      00                                 daily.           [facility]



                                                                 [facility]

 

     collagenase      -0      Yes       [id number]           Apply to         

  [facility]



     250       5-17                               affected           ity of



     unit/gram      00:00:                               area(s)           [place]



     ointment      00                                 daily.           [facility]



                                                                 [facility]

 

     collagenase      -0      Yes       [id number]           Apply to         

  [facility]



     250       5-17                               affected           ity of



     unit/gram      00:00:                               area(s)           [place]



     ointment      00                                 daily.           [facility]



                                                                 [facility]

 

     collagenase      -0      Yes       [id number]           Apply to         

  [facility]



     250       5-17                               affected           ity of



     unit/gram      00:00:                               area(s)           [place]



     ointment      00                                 daily.           [facility]



                                                                 [facility]

 

     collagenase      -0      Yes       [id number]           Apply to         

  [facility]



     250       5-17                               affected           ity of



     unit/gram      00:00:                               area(s)           [place]



     ointment      00                                 daily.           [facility]



                                                                 [facility]

 

     collagenase      -0      Yes       [id number]           Apply to         

  [facility]



     250       5-17                               affected           ity of



     unit/gram      00:00:                               area(s)           [place]



     ointment      00                                 daily.           [facility]



                                                                 [facility]

 

     collagenase      -0      Yes       [id number]           Apply to         

  [facility]



     250       5-17                               affected           ity of



     unit/gram      00:00:                               area(s)           [place]



     ointment      00                                 daily.           [facility]



                                                                 [facility]

 

     collagenase      -0      Yes       [id number]           Apply to         

  [facility]



     250       5-17                               affected           ity of



     unit/gram      00:00:                               area(s)           [place]



     ointment      00                                 daily.           [facility]



                                                                 [facility]

 

     collagenase      -0      Yes       [id number]           Apply to         

  [facility]



     250       5-17                               affected           ity of



     unit/gram      00:00:                               area(s)           [place]



     ointment      00                                 daily.           [facility]



                                                                 [facility]

 

     collagenase      -0      Yes       [id number]           Apply to         

  [facility]



     250       5-17                               affected           ity of



     unit/gram      00:00:                               area(s)           [place]



     ointment      00                                 daily.           [facility]



                                                                 [facility]

 

     collagenase      -0      Yes       [id number]           Apply to         

  [facility]



     250       5-17                               affected           ity of



     unit/gram      00:00:                               area(s)           [place]



     ointment      00                                 daily.           [facility]



                                                                 [facility]

 

     collagenase      -0      Yes       [id number]           Apply to         

  [facility]



     250       5-17                               affected           ity of



     unit/gram      00:00:                               area(s)           [place]



     ointment      00                                 daily.           Medical



                                                                 [facility]

 

     collagenase      -0      Yes       [id number]           Apply to         

  [facility]



     250       5-17                               affected           ity of



     unit/gram      00:00:                               area(s)           [place]



     ointment      00                                 daily.           Medical



                                                                 [facility]

 

     collagenase      -0      Yes       [id number]           Apply to         

  [facility]



     250       5-17                               affected           ity of



     unit/gram      00:00:                               area(s)           [place]



     ointment      00                                 daily.           Medical



                                                                 [facility]

 

     collagenase      -0      Yes       [id number]           Apply to         

  [facility]



     250       5-17                               affected           ity of



     unit/gram      00:00:                               area(s)           [place]



     ointment      00                                 daily.           [facility]



                                                                 [facility]

 

     collagenase      -0      Yes       [id number]           Apply to         

  [facility]



     250       5-17                               affected           ity of



     unit/gram      00:00:                               area(s)           [place]



     ointment      00                                 daily.           Medical



                                                                 [facility]

 

     collagenase      -0      Yes       [id number]           Apply to         

  [facility]



     250       5-17                               affected           ity of



     unit/gram      00:00:                               area(s)           [place]



     ointment      00                                 daily.           Medical



                                                                 [facility]

 

     collagenase            Yes       [id number]           Apply to         

  [facility]



     250       5-17                               affected           ity of



     unit/gram      00:00:                               area(s)           [place]



     ointment      00                                 daily.           Medical



                                                                 [facility]

 

     collagenase            Yes       [id number]           Apply to         

  [facility]



     250       5-17                               affected           ity of



     unit/gram      00:00:                               area(s)           [place]



     ointment      00                                 daily.           [facility]



                                                                 [facility]

 

     collagenase            Yes       [id number]           Apply to         

  [facility]



     250       5-17                               affected           ity of



     unit/gram      00:00:                               area(s)           [place]



     ointment      00                                 daily.           [facility]



                                                                 [facility]

 

     collagenase            Yes       [id number]           Apply to         

  [facility]



     250       5-17                               affected           ity of



     unit/gram      00:00:                               area(s)           [place]



     ointment      00                                 daily.           Medical



                                                                 [facility]

 

     collagenase            Yes       [id number]           Apply to         

  [facility]



     250       5-17                               affected           ity of



     unit/gram      00:00:                               area(s)           [place]



     ointment      00                                 daily.           Medical



                                                                 [facility]

 

     collagenase            Yes       [id number]           Apply to         

  [facility]



     250       5-17                               affected           ity of



     unit/gram      00:00:                               area(s)           [place]



     ointment      00                                 daily.           Medical



                                                                 [facility]

 

     collagenase      0      Yes       [id number]           Apply to         

  [facility]



     250       5-17                               affected           ity of



     unit/gram      00:00:                               area(s)           [place]



     ointment      00                                 daily.           Medical



                                                                 [facility]

 

     collagenase            Yes       [id number]           Apply to         

  [facility]



     250       5-17                               affected           ity of



     unit/gram      00:00:                               area(s)           [place]



     ointment      00                                 daily.           Medical



                                                                 [facility]

 

     collagenase      0      Yes       [id number]           Apply to         

  [facility]



     250       5-17                               affected           ity of



     unit/gram      00:00:                               area(s)           [place]



     ointment      00                                 daily.           Medical



                                                                 [facility]

 

     collagenase      0      Yes       [id number]           Apply to         

  [facility]



     250       5-17                               affected           ity of



     unit/gram      00:00:                               area(s)           [place]



     ointment      00                                 daily.           Medical



                                                                 [facility]

 

     docusate      2022- No        [id number] 100mg      Take 1           [facility]

n[facility]



     100 mg                                capsule by           ity of



     capsule      00:00: 04:59                          mouth 2           [place]



               00   :00                           (two)           Medical



                                                  times           [facility]



                                                  daily for           



                                                  30 days.           

 

     lactobacill      2022- No        [id number] .5mg      Take 1          

 [facility]



     us        5-17 06-17                          tablet by           ity of



     acidophilus      00:00: 04:59                          mouth 2           [place]



               00   :00                           (two)           [facility]



                                                  times           [place]



                                                  daily for           



                                                  30 days.           

 

     sennosides      2022- No        [id number] 8.6mg      Take 1          

 [facility]



     8.6 mg      5-17 06-17                          tablet by           ity of



     tablet      00:00: 04:59                          mouth 2           [place]



               00   :00                           (two)           Medical



                                                  times           [place]



                                                  daily for           



                                                  30 days.           

 

     tamsulosin      2022- No        [id number] .4mg      Take 1           

[facility]



     0.4 mg 24      5-17 06-17                          capsule by           ity

 of



     hr capsule      00:00: 04:59                          mouth           [place]



               00   :00                           daily for           Medical



                                                  30 days.           [facility]

 

     docusate      2022- No        [id number] 100mg      Take 1           [facility]

n[facility]



     100 mg      5-17 06-17                          capsule by           ity of



     capsule      00:00: 04:59                          mouth 2           [place]



               00   :00                           (two)           Medical



                                                  times           [place]



                                                  daily for           



                                                  30 days.           

 

     lactobacill      2022- No        [id number] .5mg      Take 1          

 [facility]



     us        5-17 06-17                          tablet by           ity of



     acidophilus      00:00: 04:59                          mouth 2           [facility]

[place]



               00   :00                           (two)           Medical



                                                  times           [place]



                                                  daily for           



                                                  30 days.           

 

     sennosides      2022- No        [id number] 8.6mg      Take 1          

 [facility]



     8.6 mg      5-17 06-17                          tablet by           ity of



     tablet      00:00: 04:59                          mouth 2           [place]



               00   :00                           (two)           Medical



                                                  times           [place]



                                                  daily for           



                                                  30 days.           

 

     tamsulosin      2022- No        [id number] .4mg      Take 1           

[facility]



     0.4 mg 24      5-17 06-17                          capsule by           ity

 of



     hr capsule      00:00: 04:59                          mouth           [place]



               00   :00                           daily for           Medical



                                                  30 days.           [facility]

 

     docusate      2022- No        [id number] 100mg      Take 1           [facility]

n[facility]



     100 mg      5-17 06-17                          capsule by           ity of



     capsule      00:00: 04:59                          mouth 2           [place]



               00   :00                           (two)           Medical



                                                  times           [place]



                                                  daily for           



                                                  30 days.           

 

     lactobacill      2022- No        [id number] .5mg      Take 1          

 [facility]



     us        5-17 06-17                          tablet by           ity of



     acidophilus      00:00: 04:59                          mouth 2           [facility]

[place]



               00   :00                           (two)           [facility]



                                                  times           [facility]



                                                  daily for           



                                                  30 days.           

 

     sennosides      2022- No        [id number] 8.6mg      Take 1          

 [facility]



     8.6 mg      5-17 06-17                          tablet by           ity of



     tablet      00:00: 04:59                          mouth 2           [place]



               00   :00                           (two)           Medical



                                                  times           [facility]



                                                  daily for           



                                                  30 days.           

 

     tamsulosin      2022- No        [id number] .4mg      Take 1           

[facility]



     0.4 mg 24      5-17 06-17                          capsule by           ity

 of



     hr capsule      00:00: 04:59                          mouth           [place]



               00   :00                           daily for           Medical



                                                  30 days.           [facility]

 

     docusate      2022- No        [id number] 100mg      Take 1           [facility]

[facility]



     100 mg      5-17 06-17                          capsule by           ity of



     capsule      00:00: 04:59                          mouth 2           [place]



               00   :00                           (two)           Medical



                                                  times           [facility]



                                                  daily for           



                                                  30 days.           

 

     lactobacill      2022- No        [id number] .5mg      Take 1          

 [facility]



     us        5-17 06-17                          tablet by           ity of



     acidophilus      00:00: 04:59                          mouth 2           [facility]

[place]



               00   :00                           (two)           Medical



                                                  times           [facility]



                                                  daily for           



                                                  30 days.           

 

     sennosides      2022- No        [id number] 8.6mg      Take 1          

 [facility]



     8.6 mg      5-17 06-17                          tablet by           ity of



     tablet      00:00: 04:59                          mouth 2           [place]



               00   :00                           (two)           Medical



                                                  times           [facility]



                                                  daily for           



                                                  30 days.           

 

     tamsulosin      2022- No        [id number] .4mg      Take 1           

[facility]



     0.4 mg 24      5-17 06-17                          capsule by           ity

 of



     hr capsule      00:00: 04:59                          mouth           [place]



               00   :00                           daily for           Medical



                                                  30 days.           [facility]

 

     docusate      2022- No        [id number] 100mg      Take 1           U

n[facility]



     100 mg      5-17 06-17                          capsule by           ity of



     capsule      00:00: 04:59                          mouth 2           [place]



               00   :00                           (two)           Medical



                                                  times           [facility]



                                                  daily for           



                                                  30 days.           

 

     lactobacill      2022- No        [id number] .5mg      Take 1          

 [facility]



     us        5-17 06-17                          tablet by           ity of



     acidophilus      00:00: 04:59                          mouth 2           [facility]

[place]



               00   :00                           (two)           Medical



                                                  times           [facility]



                                                  daily for           



                                                  30 days.           

 

     sennosides      2022- No        [id number] 8.6mg      Take 1          

 [facility]



     8.6 mg      -                          tablet by           ity of



     tablet      00:00: 04:59                          mouth 2           [place]



               00   :00                           (two)           Medical



                                                  times           [facility]



                                                  daily for           



                                                  30 days.           

 

     tamsulosin      2022- No        [id number] .4mg      Take 1           

[facility]



     0.4 mg 24      -                          capsule by           ity

 of



     hr capsule      00:00: 04:59                          mouth           [place]



               00   :00                           daily for           Medical



                                                  30 days.           [facility]

 

     metroNIDAZO      2022- No        [id number] 500mg      Take 1         

  [facility]



      mg      -                          tablet by           ity 

of



     tablet      00:00: 04:59                          mouth           [place]



               00   :00                           every 12           Medical



                                                  (twelve)           [facility]



                                                  hours for           



                                                  3 days.           

 

     metroNIDAZO      2022- No        [id number] 500mg      Take 1         

  [facility]



      mg      -                          tablet by           ity 

of



     tablet      00:00: 04:59                          mouth           [place]



               00   :00                           every 12           Medical



                                                  (twelve)           [facility]



                                                  hours for           



                                                  3 days.           

 

     metroNIDAZO      2022- No             500mg      500 mg,           U

nivers



     LE (FLAGYL)      5-15 05-                          Oral, Q12H           i

ty of



     tablet 500      22:00: 21:59                          ABX, 10           [facility]

as



     mg        00   :00                           doses,           Medical



                                                  First dose           [facility]



                                                  on Sun           



                                                  5/15/22 at           



                                                  1700, Last           



                                                  dose on           



                                                  22 at           



                                                  0500,           



                                                  Routine<br           



                                                  >Reason           



                                                  for            



                                                  Anti-Infec           



                                                  tive:           



                                                  Documented           



                                                  Infection<           



                                                  br>Documen           



                                                  huma            



                                                  Infection           



                                                  Site: Skin           



                                                  / Soft           



                                                  Tissue<br>           



                                                  Duration           



                                                  of             



                                                  Therapy:           



                                                  Other (see           



                                                  Comments)           

 

     cefTRIAXone      2022- No             2g        2 g, IV           [facility]

[facility]



     (ROCEPHIN)      5-15 05-17                          Piggyback,           it

y of



     2 g in NaCl      20:00: 20:03                          Q24H ABX,           

[place]



     0.9% (NS)      00   :21                           3 doses,           [facility]

l



     100 mL                                         First dose           [facility]



     MINI-BAG                                         on Sun           



                                                  5/15/22 at           



                                                  1500, Last           



                                                  dose on           



                                                  22 at           



                                                  1500,           



                                                  Administer           



                                                  over 30           



                                                  Minutes,           



                                                  100            



                                                  mL<br>Reas           



                                                  on for           



                                                  Anti-Infec           



                                                  tive:           



                                                  Documented           



                                                  Infection<           



                                                  br>Documen           



                                                  huma            



                                                  Infection           



                                                  Site: Skin           



                                                  / Soft           



                                                  Tissue<br>           



                                                  Duration           



                                                  of             



                                                  Therapy:           



                                                  Other (see           



                                                  Comments)           

 

     enoxaparin            Yes            30mg      30 mg,           Unive

rs



     (LOVENOX)                                     Subcutaneo           ity 

of



     injection      01:00:                               us, Q12H,           [facility]

as



     30 mg      00                                 First dose           Medical



                                                  on Wed           [facility]



                                                  22 at           



                                                  2000,           



                                                  Until           



                                                  Discontinu           



                                                  ed,            



                                                  Routine           

 

     proMETHazin            Yes            25mg      25 mg, IV           U

nivers



     e                                        Piggyback,           ity of



     (PHENERGAN)      22:58:                               Q4HPRN,           [facility]

as



     25 mg in      13                                 Starting           Medical



     NaCl 0.9%                                         on 



     (NS) 50 mL                                         22 at           



     IV                                           1758,           



     piggyback                                         Until           



                                                  Discontinu           



                                                  ed,            



                                                  Routine,           



                                                  Nausea and           



                                                  Vomiting           



                                                  (N/V)           

 

     collagenase            Yes                      Topical           Uni

vers



     (SANTYL)                                     (Apply To           ity of



     ointment      22:15:                               Affected           [place]



               00                                 Areas),           Medical



                                                  DAILY,           [facility]



                                                  First dose           



                                                  on 22 at           



                                                  1715,           



                                                  Until           



                                                  Discontinu           



                                                  ed,            



                                                  Routine           

 

     HYDROmorpho      2022- No             1mg       1 mg, Slow          

 [facility]



     ne                                  IV Push,           ity of



     (DILAUDID)      21:45: 21:25                          ONCE, 1           [facility]

as



     injection 1      00   :00                           dose, On           Medi

[facility]



     mg                                           22 at           



                                                  1645,           



                                                  Routine<br           



                                                  >Use           



                                                  approved           



                                                  by             



                                                  (Faculty):           



                                                  ADC            



                                                  PROVIDER           

 

     magnesium      2022- No             2g        2 g, IV           Univ

ers



     sulfate in                                Piggyback,           it

y of



     water 2      15:15: 16:11                          Administer           [facility]

as



     gram/50 mL      00   :00                           over 60           Med[facility]

l



     (4 %)                                         Minutes,           [facility]



     infusion 2                                         ONCE, 1           



     g                                            dose, On           



                                                  22 at           



                                                  1015,           



                                                  Routine           

 

     lactulose            Yes            15mL      15 mL,           [facility]

s



     (CEPHULAC)                                     Oral,           ity of



     solution 15      14:00:                               DAILY,           [place]

[place]



     mL        00                                 First dose           Medical



                                                  on Wed           [facility]



                                                  22 at           



                                                  0900,           



                                                  Until           



                                                  Discontinu           



                                                  ed,            



                                                  Routine           

 

     vancomycin      2022- No             125mg      125 mg,           [facility]

[facility]



     (FIRVANQ)       05-16                          Oral,           ity of



     50 mg/mL      14:00: 16:27                          Q24H,           [place]



     oral      00   :02                           First dose           Medical



     solution                                         on 



     125 mg                                         22 at           



                                                  0900,           



                                                  Until           



                                                  Discontinu           



                                                  ed,            



                                                  Routine<br           



                                                  >Reason           



                                                  for            



                                                  Anti-Infec           



                                                  tive:           



                                                  [facility]           



                                                  Non-Surgic           



                                                  al             



                                                  Prophylaxi           



                                                  s<br>Durat           



                                                  ion of           



                                                  therapy: 7           



                                                  days           

 

     sennosides      -0      Yes            8.6mg      8.6 mg,           Uni

vers



     (SENOKOT)                                     Oral, BID,           ity 

of



     tablet 8.6      13:00:                               First dose           [facility]

[facility]



     mg        00                                 on Wed           Medical



                                                  22 at           [facility]



                                                  0800,           



                                                  Until           



                                                  Discontinu           



                                                  ed,            



                                                  Routine           

 

     docusate      0      Yes            100mg      100 mg,           Unive

rs



     (COLACE)                                     Oral, BID,           ity o

f



     capsule 100      13:00:                               First dose           

[place]



     mg        00                                 on Wed           Medical



                                                  22 at           [facility]



                                                  0800,           



                                                  Until           



                                                  Discontinu           



                                                  ed,            



                                                  Routine           

 

     HYDROmorpho      0      Yes            2mg       2 mg,           Unive

rs



     ne                                       Oral, TID,           ity of



     (DILAUDID)      13:00:                               First dose           T

[facility]



     tablet 2 mg      00                                 on Wed           [facility]

l



                                                  22 at           [facility]



                                                  0800,           



                                                  Until           



                                                  Discontinu           



                                                  ed             

 

     lactobacill      0      Yes            .5mg      0.5 mg,           Uni

vers



     us                                       Oral, BID,           ity of



     acidophilus      13:00:                               First dose           

[place]



     tablet 0.5      00                                 on Wed           Medical



     mg                                           22 at           [facility]



                                                  0800,           



                                                  Until           



                                                  Discontinu           



                                                  ed,            



                                                  Routine           

 

     ceFEPIme      -0 - No             2g        2 g, IV           Unive

rs



     (MAXIPIME)       05-15                          Piggyback,           it

y of



     2 g in NaCl      11:00: 19:23                          Q8H ABX,           T

[facility]



     0.9% (NS)      00   :40                           First dose           Medi

sarah



     100 mL                                         on Wed           [facility]



     MINI-BAG                                         22 at           



                                                  0600,           



                                                  Until           



                                                  Discontinu           



                                                  ed,            



                                                  Administer           



                                                  over 30           



                                                  Minutes,           



                                                  100            



                                                  mL<br>Reas           



                                                  on for           



                                                  Anti-Infec           



                                                  tive:           



                                                  Empiric           



                                                  Therapy           



                                                  for            



                                                  Suspected           



                                                  Infection<           



                                                  br&gt;Empi           



                                                  alda            



                                                  Therapy           



                                                  Site:           



                                                  Bone<br>Du           



                                                  ration of           



                                                  therapy:           



                                                  72 hours           

 

     NaCl 0.9%      2022- No             1000mL      at 50           Univ

ers



     (NS) IV       05-16                          mL/hr, IV           ity of



     infusion      10:00: 16:41                          Infusion,           Eric

as



     1,000 mL      00   :28                           CONTINUOUS           Medic

al



                                                  , Starting           [facility]



                                                  on 22 at           



                                                  0500,           



                                                  Until Mon           



                                                  22 at           



                                                  1141,           



                                                  Routine           

 

     doxycycline       No             100mg      100 mg, IV         

  [facility]



     (VIBRAMYCIN                                Piggyback,           i

ty of



     ) 100 mg in      09:15: 23:11                          Q12H ABX,           

[place]



     NaCl 0.9%      00   :48                           First dose           Medi

sarah



     (NS) 100 mL                                         on Wed           [facility]



     MINI-BAG                                         22 at           



                                                  0415,           



                                                  Until           



                                                  Discontinu           



                                                  ed,            



                                                  Administer           



                                                  over 60           



                                                  Minutes,           



                                                  100            



                                                  mL<br>Reas           



                                                  on for           



                                                  Anti-Infec           



                                                  tive:           



                                                  Empiric           



                                                  Therapy           



                                                  for            



                                                  Suspected           



                                                  Infection<           



                                                  br>Empiric           



                                                  Therapy           



                                                  Site:           



                                                  Wound<br>D           



                                                  uration of           



                                                  therapy: 7           



                                                  days           

 

     tamsulosin            Yes            .4mg      0.4 mg,           Univ

ers



     (FLOMAX)                                     Oral,           ity of



     capsule 0.4      08:30:                               DAILY,           [place]

[facility]



     mg        00                                 First dose           Medical



                                                  on 22 at           



                                                  0330,           



                                                  Until           



                                                  Discontinu           



                                                  ed,            



                                                  Routine           

 

     HYDROmorpho            Yes            1mg       1 mg, Slow           

[facility]



     ne                                       IV Push,           ity of



     (DILAUDID)      06:04:                               Q4HPRN,           [place]

[facility]



     injection 1      07                                 Starting           Medi

sarah



     mg                                           on 22 at           



                                                  0104,           



                                                  Until           



                                                  Discontinu           



                                                  ed,            



                                                  Routine,           



                                                  Pain           



                                                  (scale           



                                                  7-10)<br>U           



                                                  se             



                                                  approved           



                                                  by             



                                                  (Faculty):           



                                                  ADC            



                                                  PROVIDER           

 

     FENTanyl PF      2022- No             50ug      50 mcg,           Un

yoselyn



     (SUBLIMAZE                                Slow IV           ity o

f



     (PF))      05:30: 05:01                          Push,           [place]



     injection      00   :00                           ONCE, 1           Medical



     50 mcg                                         dose, On           [facility]



                                                  22 at           



                                                  0030, STAT           

 

     iopamidol      2022- No        [id number] 150mL      150 mL,         

  [facility]



     (ISOVUE                                Intravenou           ity o

f



     370-500 mL)      04:45: 03:34                          s, ONCE, 1          

 [place]



     injection      00   :00                           dose, On           Medica

l



     150 mL                                         e            [place]



                                                  5/10/22 at           



                                                  2345,           



                                                  Routine           

 

     ceFEPIme      2022- No             2000mg      2,000 mg,           U

nivers



     (MAXIPIME)                                IV             ity of



     injection      04:15: 04:15                          Piggyback,           T

[facility]



     2,000 mg      00   :00                           ONCE, 1           Medical



                                                  dose, On           [facility]



                                                  Tue            



                                                  5/10/22 at           



                                                  2315,           



                                                  STAT<br>Re           



                                                  ason for           



                                                  Anti-Infec           



                                                  tive:           



                                                  [facility]           



                                                  Therapy           



                                                  for            



                                                  Suspected           



                                                  Infection<           



                                                  br>Empiric           



                                                  Therapy           



                                                  Site:           



                                                  Bone<br>Du           



                                                  ration of           



                                                  therapy:           



                                                  72 hours           

 

     FENTanyl PF      2022- No             75ug      75 mcg,           Un

yoselyn



     (SUBLIMAZE                                Slow IV           ity o

f



     (PF))      03:45: 02:47                          Push,           [place]



     injection      00   :00                           ONCE, 1           Medical



     75 mcg                                         dose, On           [facility]



                                                  Tue            



                                                  5/10/22 at           



                                                  2245, STAT           

 

     OXYCODONE      2022- No                       Take by           [facility]

ers



     HCL/ACETAMI                                mouth.           ity o

f



     NOPHEN      03:22: 00:00                                         [place]



     (PERCOCET      50   :00                                          Medical



     ORAL)                                                        [facility]

 

     FENTanyl PF      2022- No             100ug      100 mcg,           

[facility]



     (SUBLIMAZE                                Slow IV           ity o

f



     (PF))      01:30: 01:12                          Push,           [place]



     injection      00   :00                           ONCE, 1           Medical



     100 mcg                                         dose, On           [facility]



                                                  Tue            



                                                  5/10/22 at           



                                                  2030, STAT           

 

     Lovenox            No                       Notes:           [facility]



               4-13                               (Same as:           l



               17:00:                               Lovenox)           Jose                                                

 

     Lovenox            No                       Notes:           [facility]



               4-13                               (Same as:           l



               17:00:                               Lovenox)           [facility]                                                

 

     Lovenox            No                       Notes:           [facility]



               4-13                               (Same as:           l



               17:00:                               Lovenox)           [facility]



                                                               

 

     Lovenox            No                       Notes:           [facility]



               4-13                               (Same as:           l



               17:00:                               Lovenox)           [facility]                                                

 

     Lovenox            No                       Notes:           [facility]



               4-13                               (Same as:           l



               17:00:                               Lovenox)           Jose                                                

 

     Lovenox            No                       Notes:           [facility]



               4-13                               (Same as:           l



               17:00:                               Lovenox)           [facility]                                                

 

     Lovenox            No                       Notes:           [facility]



               4-13                               (Same as:           l



               17:00:                               Lovenox)           [facility]                                                

 

     promethazin            No                       Notes:           [facility]

rajeev



     e         4-13                               (Same as:           l



               16:47:                               Phenergan)           Jose                                                

 

     promethazin            No                       Notes:           [facility]

rajeev



     e         4-13                               (Same as:           l



               16:47:                               Phenergan)           Jose                                                

 

     promethazin            No                       Notes:           [facility]

rajeev



     e         4-13                               (Same as:           l



               16:47:                               Phenergan)           Jose                                                

 

     promethazin            No                       Notes:           [facility]

rajeev



     e         4-13                               (Same as:           l



               16:47:                               Phenergan)           Jose                                                

 

     promethazin            No                       Notes:           [facility]

rajeev



     e         4-13                               (Same as:           l



               16:47:                               Phenergan)           [place]                                                

 

     promethazin            No                       Notes:           [facility]

rajeev



     e         4-13                               (Same as:           l



               16:47:                               Phenergan)           [place]                                                

 

     promethazin            No                       Notes:           [facility]

rajeev



     e         4-13                               (Same as:           l



               16:47:                               Phenergan)                                                           

 

     albuterol            No                       Notes: SEE           Me

moria



     0.083%      4-13                               RT             l



     inhalation      16:46:                               DOCUMENTAT           H

ermann



     solution      00                                 ION (Same           



                                                  as:            



                                                  Proventil)           

 

     albuterol            No                       Notes: SEE           Me

moria



     0.083%      4-13                               RT             l



     inhalation      16:46:                               DOCUMENTAT           H

ermann



     solution      00                                 ION (Same           



                                                  as:            



                                                  Proventil)           

 

     albuterol            No                       Notes: SEE           Me

moria



     0.083%      4-13                               RT             l



     inhalation      16:46:                               DOCUMENTAT           H

ermann



     solution      00                                 ION (Same           



                                                  as:            



                                                  Proventil)           

 

     albuterol            No                       Notes: SEE           Me

moria



     0.083%      4-13                               RT             l



     inhalation      16:46:                               DOCUMENTAT           H

ermann



     solution      00                                 ION (Same           



                                                  as:            



                                                  Proventil)           

 

     albuterol            No                       Notes: SEE           Me

moria



     0.083%      4-13                               RT             l



     inhalation      16:46:                               DOCUMENTAT           H

ermann



     solution      00                                 ION (Same           



                                                  as:            



                                                  Proventil)           

 

     albuterol      0      No                       Notes: SEE           Me

moria



     0.083%      4-13                               RT             l



     inhalation      16:46:                               DOCUMENTAT           H

ermann



     solution      00                                 ION (Same           



                                                  as:            



                                                  Proventil)           

 

     albuterol      0      No                       Notes: SEE           Me

moria



     0.083%      4-13                               RT             l



     inhalation      16:46:                               DOCUMENTAT           H

ermann



     solution      00                                 ION (Same           



                                                  as:            



                                                  Proventil)           

 

     hydromorpho      -0      Yes                      4 mg = 1           Me

moria



     ne 4 mg      4-13                               tab, PO,           l



     oral tablet      16:43:                               Q12H, 0           Her

child



               00                                 Refill(s)           

 

     hydromorpho      -0      Yes                      4 mg = 1           Me

moria



     ne 4 mg      4-13                               tab, PO,           l



     oral tablet      16:43:                               Q12H, 0           Her

child



               00                                 Refill(s)           

 

     hydromorpho      -0      Yes                      4 mg = 1           Me

moria



     ne 4 mg      4-13                               tab, PO,           l



     oral tablet      16:43:                               Q12H, 0           Her

child



               00                                 Refill(s)           

 

     hydromorpho      -0      Yes                      4 mg = 1           Me

moria



     ne 4 mg      4-13                               tab, PO,           l



     oral tablet      16:43:                               Q12H, 0           Her

child



               00                                 Refill(s)           

 

     hydromorpho      -0      Yes                      4 mg = 1           Me

moria



     ne 4 mg      4-13                               tab, PO,           l



     oral tablet      16:43:                               Q12H, 0           Her

child



               00                                 Refill(s)           

 

     hydromorpho      -0      Yes                      4 mg = 1           Me

moria



     ne 4 mg      4-13                               tab, PO,           l



     oral tablet      16:43:                               Q12H, 0           Her

child



               00                                 Refill(s)           

 

     hydromorpho      -0      Yes                      4 mg = 1           Me

moria



     ne 4 mg      4-13                               tab, PO,           l



     oral tablet      16:43:                               Q12H, 0           Her

child



               00                                 Refill(s)           

 

     Lantus 100      -0      No                       10 unit,           [facility]

[facility]



     units/mL      4-13                               SUB-Q,           l



               16:40:                               Daily, 0           [facility]



               00                                 Refill(s)           

 

     Lantus 100      -0      No                       10 unit,           [facility]

[facility]



     units/mL      4-13                               SUB-Q,           l



               16:40:                               Daily, 0           [facility]



               00                                 Refill(s)           

 

     Lantus 100      -0      No                       10 unit,           [facility]

[facility]



     units/mL      4-13                               SUB-Q,           l



               16:40:                               Daily, 0           [facility]



               00                                 Refill(s)           

 

     Lantus 100      -0      No                       10 unit,           [facility]

[facility]



     units/mL      4-13                               SUB-Q,           l



               16:40:                               Daily, 0           [facility]



               00                                 Refill(s)           

 

     Lantus 100      -0      No                       10 unit,           [facility]

[facility]



     units/mL      4-13                               SUB-Q,           l



               16:40:                               Daily, 0           [facility]



               00                                 Refill(s)           

 

     Lantus 100      -0      No                       10 unit,           [facility]

[facility]



     units/mL      4-13                               SUB-Q,           l



               16:40:                               Daily, 0           Jose



                                                Refill(s)           

 

     Lantus 100      -0      No                       10 unit,           [facility]

[facility]



     units/mL      4-13                               SUB-Q,           l



               16:40:                               Daily, 0           [facility]



               00                                 Refill(s)           

 

     Lantus 100      -0      No                       10 unit,           [facility]

[facility]



     units/mL      4-12                               0.1 mL,           l



               14:00:                               Route:           [facility]



               00                                 SUB-Q,           



                                                  Drug form:           



                                                  SOLN,           



                                                  Daily,           



                                                  Dosing           



                                                  Weight           



                                                  127.727,           



                                                  kg, Start           



                                                  date:           



                                                  22           



                                                  9:00:00           



                                                  CDT,           



                                                  Duration:           



                                                  30 day,           



                                                  Stop date:           



                                                  22           



                                                  9:00:00           



                                                  CDT,           



                                                  Infuse           



                                                  over: 0           



                                                  hr, 0           

 

     Lantus 100      -0      No                       10 unit,           [facility]

[facility]



     units/mL      4-12                               0.1 mL,           l



               14:00:                               Route:           [facility]



               00                                 SUB-Q,           



                                                  Drug form:           



                                                  SOLN,           



                                                  Daily,           



                                                  Dosing           



                                                  Weight           



                                                  127.727,           



                                                  kg, Start           



                                                  date:           



                                                  22           



                                                  9:00:00           



                                                  CDT,           



                                                  Duration:           



                                                  30 day,           



                                                  Stop date:           



                                                  22           



                                                  9:00:00           



                                                  CDT,           



                                                  Infuse           



                                                  over: 0           



                                                  hr, 0           

 

     Lantus 100      -0      No                       10 unit,           [facility]

[facility]



     units/mL      4-12                               0.1 mL,           l



               14:00:                               Route:           [facility]



               00                                 SUB-Q,           



                                                  Drug form:           



                                                  SOLN,           



                                                  Daily,           



                                                  Dosing           



                                                  Weight           



                                                  127.727,           



                                                  kg, Start           



                                                  date:           



                                                  22           



                                                  9:00:00           



                                                  CDT,           



                                                  Duration:           



                                                  30 day,           



                                                  Stop date:           



                                                  22           



                                                  9:00:00           



                                                  CDT,           



                                                  Infuse           



                                                  over: 0           



                                                  hr, 0           

 

     Lantus 100      2022-0      No                       10 unit,           [facility]

[facility]



     units/mL      4-12                               0.1 mL,           l



               14:00:                               Route:                                            SUB-Q,           



                                                  Drug form:           



                                                  SOLN,           



                                                  Daily,           



                                                  Dosing           



                                                  Weight           



                                                  127.727,           



                                                  kg, Start           



                                                  date:           



                                                  22           



                                                  9:00:00           



                                                  CDT,           



                                                  Duration:           



                                                  30 day,           



                                                  Stop date:           



                                                  22           



                                                  9:00:00           



                                                  CDT,           



                                                  Infuse           



                                                  over: 0           



                                                  hr, 0           

 

     Lantus 100      2022-0      No                       10 unit,           [facility]

[facility]



     units/mL      4-12                               0.1 mL,           l



               14:00:                               Route:                                            SUB-Q,           



                                                  Drug form:           



                                                  SOLN,           



                                                  Daily,           



                                                  Dosing           



                                                  Weight           



                                                  127.727,           



                                                  kg, Start           



                                                  date:           



                                                  22           



                                                  9:00:00           



                                                  CDT,           



                                                  Duration:           



                                                  30 day,           



                                                  Stop date:           



                                                  22           



                                                  9:00:00           



                                                  CDT,           



                                                  Infuse           



                                                  over: 0           



                                                  hr, 0           

 

     Lantus 100      2022-0      No                       10 unit,           [facility]

[facility]



     units/mL      4-12                               0.1 mL,           l



               14:00:                               Route:                                            SUB-Q,           



                                                  Drug form:           



                                                  SOLN,           



                                                  Daily,           



                                                  Dosing           



                                                  Weight           



                                                  127.727,           



                                                  kg, Start           



                                                  date:           



                                                  22           



                                                  9:00:00           



                                                  CDT,           



                                                  Duration:           



                                                  30 day,           



                                                  Stop date:           



                                                  22           



                                                  9:00:00           



                                                  CDT,           



                                                  Infuse           



                                                  over: 0           



                                                  hr, 0           

 

     Lantus 100      2-0      No                       10 unit,           [facility]

[facility]



     units/mL      4-12                               0.1 mL,           l



               14:00:                               Route:                                            SUB-Q,           



                                                  Drug form:           



                                                  SOLN,           



                                                  Daily,           



                                                  Dosing           



                                                  Weight           



                                                  127.727,           



                                                  kg, Start           



                                                  date:           



                                                  22           



                                                  9:00:00           



                                                  CDT,           



                                                  Duration:           



                                                  30 day,           



                                                  Stop date:           



                                                  22           



                                                  9:00:00           



                                                  CDT,           



                                                  Infuse           



                                                  over: 0           



                                                  hr, 0           

 

     cefepime +            No                       Notes:           [facility]

[facility]



     sterile      4-12                               (Same As:           l



     water 10 mL      06:00:                               Maxipime)                                            ***            



                                                  MEDICATION           



                                                  WASTE ***           



                                                  Product           



                                                  Size: 1000           



                                                  mg Product           



                                                  Wasted:           



                                                  _0__ mg           

 

     cefepime +            No                       Notes:           [facility]

[facility]



     sterile      4-12                               (Same As:           l



     water 10 mL      06:00:                               Maxipime)           [facility]

[facility]



               00                                 ***            



                                                  MEDICATION           



                                                  WASTE ***           



                                                  Product           



                                                  Size: 1000           



                                                  mg Product           



                                                  Wasted:           



                                                  _0__ mg           

 

     cefepime +      -0      No                       Notes:           [facility]

[facility]



     sterile      4-12                               (Same As:           l



     water 10 mL      06:00:                               Maxipime)           [facility]

[facility]



               00                                 ***            



                                                  MEDICATION           



                                                  WASTE ***           



                                                  Product           



                                                  Size: 1000           



                                                  mg Product           



                                                  Wasted:           



                                                  _0__ mg           

 

     cefepime +      -0      No                       Notes:           [facility]

[facility]



     sterile      4-12                               (Same As:           l



     water 10 mL      06:00:                               Maxipime)           [facility]

[facility]



               00                                 ***            



                                                  MEDICATION           



                                                  WASTE ***           



                                                  Product           



                                                  Size: 1000           



                                                  mg Product           



                                                  Wasted:           



                                                  _0__ mg           

 

     cefepime +      -0      No                       Notes:           [facility]

[facility]



     sterile      4-12                               (Same As:           l



     water 10 mL      06:00:                               Maxipime)           [facility]

[facility]



               00                                 ***            



                                                  MEDICATION           



                                                  WASTE ***           



                                                  Product           



                                                  Size: 1000           



                                                  mg Product           



                                                  Wasted:           



                                                  _0__ mg           

 

     cefepime +      -0      No                       Notes:           [facility]

[facility]



     sterile      4-12                               (Same As:           l



     water 10 mL      06:00:                               Maxipime)           [facility]

[facility]



               00                                 ***            



                                                  MEDICATION           



                                                  WASTE ***           



                                                  Product           



                                                  Size: 1000           



                                                  mg Product           



                                                  Wasted:           



                                                  _0__ mg           

 

     cefepime +      -0      No                       Notes:           [facility]

[facility]



     sterile      4-12                               (Same As:           l



     water 10 mL      06:00:                               Maxipime)           [facility]

[facility]



               00                                 ***            



                                                  MEDICATION           



                                                  WASTE ***           



                                                  Product           



                                                  Size: 1000           



                                                  mg Product           



                                                  Wasted:           



                                                  _0__ mg           

 

     cefepime +      -0      No                       Notes:           [facility]

[facility]



     sterile      4-12                               (Same As:           l



     water 10 mL      05:00:                               Maxipime)           [facility]

[facility]



               00                                 ***            



                                                  MEDICATION           



                                                  WASTE ***           



                                                  Product           



                                                  Size: 1000           



                                                  mg Product           



                                                  Wasted:           



                                                  _0__ mg           

 

     insulin      -0      No                       Notes:           [facility]



     lispro      4-12                               (Same as:           l



               05:00:                               Humalog)           Jose



                                                Roll in           



                                                  palms of           



                                                  hands           



                                                  gently; Do           



                                                  not shake           



                                                  vigorously           



                                                  . WASTE:           



                                                  F/P -           



                                                  Black; E -           



                                                  Municipal           



                                                  Trash Bin           



                                                  Stable for           



                                                  28 days at           



                                                  room           



                                                  temperatur           



                                                  e. Expires           



                                                  in _____           



                                                  days from           



                                                  __________           



                                                  ____Date           

 

     cefepime +            No                       Notes:           [facility]

[facility]



     sterile      4-12                               (Same As:           l



     water 10 mL      05:00:                               Maxipime)           H

[facility]



               00                                 ***            



                                                  MEDICATION           



                                                  WASTE ***           



                                                  Product           



                                                  Size:           



                                                  1000 mg           



                                                  Product           



                                                  Wasted:           



                                                  _0__ mg           

 

     insulin            No                       Notes:           [facility]



     lispro      4-12                               (Same as:           l



               05:00:                               Humalog)           [facility]



               00                                 Roll in           



                                                  palms of           



                                                  hands           



                                                  gently; Do           



                                                  not shake           



                                                  vigorously           



                                                  . WASTE:           



                                                  F/P -           



                                                  Black; E -           



                                                  Municipal           



                                                  Trash Bin           



                                                  Stable for           



                                                  28 days at           



                                                  room           



                                                  temperatur           



                                                  e. Expires           



                                                  in _____           



                                                  days from           



                                                  __________           



                                                  ____Date           

 

     cefepime +            No                       Notes:           [facility]

[facility]



     sterile      4-12                               (Same As:           l



     water 10 mL      05:00:                               Maxipime)           [facility]

[facility]



               00                                 ***            



                                                  MEDICATION           



                                                  WASTE ***           



                                                  Product           



                                                  Size: 1000           



                                                  mg Product           



                                                  Wasted:           



                                                  _0__ mg           

 

     insulin            No                       Notes:           [facility]



     lispro      4-12                               (Same as:           l



               05:00:                               Humalog)           [facility]



               00                                 Roll in           



                                                  palms of           



                                                  hands           



                                                  gently; Do           



                                                  not shake           



                                                  vigorously           



                                                  . WASTE:           



                                                  F/P -           



                                                  Black; E -           



                                                  Municipal           



                                                  Trash Bin           



                                                  Stable for           



                                                  28 days at           



                                                  room           



                                                  temperatur           



                                                  e. Expires           



                                                  in _____           



                                                  days from           



                                                  __________           



                                                  ____Date           

 

     cefepime +            No                       Notes:           [facility]

[facility]



     sterile      4-12                               (Same As:           l



     water 10 mL      05:00:                               Maxipime)           [facility]

[facility]



               00                                 ***            



                                                  MEDICATION           



                                                  WASTE ***           



                                                  Product           



                                                  Size: 1000           



                                                  mg Product           



                                                  Wasted:           



                                                  _0__ mg           

 

     insulin            No                       Notes:           [facility]



     lispro      4-12                               (Same as:           l



               05:00:                               Humalog)           [facility]



               00                                 Roll in           



                                                  palms of           



                                                  hands           



                                                  gently; Do           



                                                  not shake           



                                                  vigorously           



                                                  . WASTE:           



                                                  F/P -           



                                                  Black; E -           



                                                  Municipal           



                                                  Trash Bin           



                                                  Stable for           



                                                  28 days at           



                                                  room           



                                                  temperatur           



                                                  e. Expires           



                                                  in _____           



                                                  days from           



                                                  __________           



                                                  ____Date           

 

     cefepime +            No                       Notes:           [facility]

[facility]



     sterile      4-12                               (Same As:           l



     water 10 mL      05:00:                               Maxipime)           [facility]

[facility]



               00                                 ***            



                                                  MEDICATION           



                                                  WASTE ***           



                                                  Product           



                                                  Size: 1000           



                                                  mg Product           



                                                  Wasted:           



                                                  _0__ mg           

 

     insulin            No                       Notes:           [facility]



     lispro      4-12                               (Same as:           l



               05:00:                               Humalog)           [facility]



               00                                 Roll in           



                                                  palms of           



                                                  hands           



                                                  gently; Do           



                                                  not shake           



                                                  vigorously           



                                                  . WASTE:           



                                                  F/P -           



                                                  Black; E -           



                                                  Municipal           



                                                  Trash Bin           



                                                  Stable for           



                                                  28 days at           



                                                  room           



                                                  temperatur           



                                                  e. Expires           



                                                  in _____           



                                                  days from           



                                                  __________           



                                                  ____Date           

 

     cefepime +            No                       Notes:           [facility]

[facility]



     sterile      4-12                               (Same As:           l



     water 10 mL      05:00:                               Maxipime)           [facility]

[facility]



               00                                 ***            



                                                  MEDICATION           



                                                  WASTE ***           



                                                  Product           



                                                  Size: 1000           



                                                  mg Product           



                                                  Wasted:           



                                                  _0__ mg           

 

     insulin            No                       Notes:           [facility]



     lispro      4-12                               (Same as:           l



               05:00:                               Humalog)           [facility]



               00                                 Roll in           



                                                  palms of           



                                                  hands           



                                                  gently; Do           



                                                  not shake           



                                                  vigorously           



                                                  . WASTE:           



                                                  F/P -           



                                                  Black; E -           



                                                  Municipal           



                                                  Trash Bin           



                                                  Stable for           



                                                  28 days at           



                                                  room           



                                                  temperatur           



                                                  e. Expires           



                                                  in _____           



                                                  days from           



                                                  __________           



                                                  ____Date           

 

     cefepime +            No                       Notes:           [facility]

[facility]



     sterile      4-12                               (Same As:           l



     water 10 mL      05:00:                               Maxipime)           [facility]

[facility]



               00                                 ***            



                                                  MEDICATION           



                                                  WASTE ***           



                                                  Product           



                                                  Size: 1000           



                                                  mg Product           



                                                  Wasted:           



                                                  _0__ mg           

 

     insulin            No                       Notes:           [facility]



     lispro      4-12                               (Same as:           l



               05:00:                               Humalog)           [facility]



               00                                 Roll in           



                                                  palms of           



                                                  hands           



                                                  gently; Do           



                                                  not shake           



                                                  vigorously           



                                                  . WASTE:           



                                                  F/P -           



                                                  Black; E -           



                                                  Municipal           



                                                  Trash Bin           



                                                  Stable for           



                                                  28 days at           



                                                  room           



                                                  temperatur           



                                                  e. Expires           



                                                  in _____           



                                                  days from           



                                                  __________           



                                                  ____Date           

 

     Dilaudid            No                       Notes:           [facility]



               4-11                               (Same as:           l



               16:53:                               Dilaudid)           [facility]



                                                               

 

     Dilaudid            No                       Notes:           [facility]



               4-11                               (Same as:           l



               16:53:                               Dilaudid)           [facility]



                                                               

 

     Dilaudid            No                       Notes:           [facility]



               4-11                               (Same as:           l



               16:53:                               Dilaudid)                                                           

 

     Dilaudid            No                       Notes:           [facility]



               4-11                               (Same as:           l



               16:53:                               Dilaudid)                                                           

 

     Dilaudid            No                       Notes:           [facility]



               4-11                               (Same as:           l



               16:53:                               Dilaudid)                                                           

 

     Dilaudid            No                       Notes:           [facility]



               4-11                               (Same as:           l



               16:53:                               Dilaudid)                                                           

 

     Dilaudid            No                       Notes:           [facility]



               4-11                               (Same as:           l



               16:53:                               Dilaudid)                                                           

 

     Lantus 100            No                       10 unit,           [facility]

[facility]



     units/mL      4-11                               0.1 mL,           l



               16:24:                               Route:           [facility]



                                                SUB-Q,           



                                                  Drug form:           



                                                  SOLN,           



                                                  ONCE,           



                                                  Dosing           



                                                  Weight           



                                                  127.727,           



                                                  kg, Start           



                                                  date:           



                                                  22           



                                                  11:24:00           



                                                  CDT, Stop           



                                                  date:           



                                                  22           



                                                  11:24:00           



                                                  CDT,           



                                                  Infuse           



                                                  over: 0           



                                                  hr, 0           

 

     Lantus 100      -0      No                       10 unit,           [facility]

[facility]



     units/mL      4-11                               0.1 mL,           l



               16:24:                               Route:           [facility]



                                                SUB-Q,           



                                                  Drug form:           



                                                  SOLN,           



                                                  ONCE,           



                                                  Dosing           



                                                  Weight           



                                                  127.727,           



                                                  kg, Start           



                                                  date:           



                                                  22           



                                                  11:24:00           



                                                  CDT, Stop           



                                                  date:           



                                                  22           



                                                  11:24:00           



                                                  CDT,           



                                                  Infuse           



                                                  over: 0           



                                                  hr, 0           

 

     Lantus 100      -0      No                       10 unit,           [facility]

[facility]



     units/mL      4-11                               0.1 mL,           l



               16:24:                               Route:           [facility]



                                                SUB-Q,           



                                                  Drug form:           



                                                  SOLN,           



                                                  ONCE,           



                                                  Dosing           



                                                  Weight           



                                                  127.727,           



                                                  kg, Start           



                                                  date:           



                                                  22           



                                                  11:24:00           



                                                  CDT, Stop           



                                                  date:           



                                                  22           



                                                  11:24:00           



                                                  CDT,           



                                                  Infuse           



                                                  over: 0           



                                                  hr, 0           

 

     Lantus 100      -0      No                       10 unit,           [facility]

[facility]



     units/mL      4-11                               0.1 mL,           l



               16:24:                               Route:           [facility]



                                                SUB-Q,           



                                                  Drug form:           



                                                  SOLN,           



                                                  ONCE,           



                                                  Dosing           



                                                  Weight           



                                                  127.727,           



                                                  kg, Start           



                                                  date:           



                                                  22           



                                                  11:24:00           



                                                  CDT, Stop           



                                                  date:           



                                                  22           



                                                  11:24:00           



                                                  CDT,           



                                                  Infuse           



                                                  over: 0           



                                                  hr, 0           

 

     Lantus 100      -0      No                       10 unit,           [facility]

[facility]



     units/mL      4-11                               0.1 mL,           l



               16:24:                               Route:           [facility]



               00                                 SUB-Q,           



                                                  Drug form:           



                                                  SOLN,           



                                                  ONCE,           



                                                  Dosing           



                                                  Weight           



                                                  127.727,           



                                                  kg, Start           



                                                  date:           



                                                  22           



                                                  11:24:00           



                                                  CDT, Stop           



                                                  date:           



                                                  22           



                                                  11:24:00           



                                                  CDT,           



                                                  Infuse           



                                                  over: 0           



                                                  hr, 0           

 

     Lantus 100      -0      No                       10 unit,           [facility]

[facility]



     units/mL      4-11                               0.1 mL,           l



               16:24:                               Route:           [facility]



               00                                 SUB-Q,           



                                                  Drug form:           



                                                  SOLN,           



                                                  ONCE,           



                                                  Dosing           



                                                  Weight           



                                                  127.727,           



                                                  kg, Start           



                                                  date:           



                                                  22           



                                                  11:24:00           



                                                  CDT, Stop           



                                                  date:           



                                                  22           



                                                  11:24:00           



                                                  CDT,           



                                                  Infuse           



                                                  over: 0           



                                                  hr, 0           

 

     Lantus 100      -0      No                       10 unit,           [facility]

[facility]



     units/mL      4-11                               0.1 mL,           l



               16:24:                               Route:           [facility]



               00                                 SUB-Q,           



                                                  Drug form:           



                                                  SOLN,           



                                                  ONCE,           



                                                  Dosing           



                                                  Weight           



                                                  127.727,           



                                                  kg, Start           



                                                  date:           



                                                  22           



                                                  11:24:00           



                                                  CDT, Stop           



                                                  date:           



                                                  22           



                                                  11:24:00           



                                                  CDT,           



                                                  Infuse           



                                                  over: 0           



                                                  hr, 0           

 

     NS (Bolus)      0      No                       500 mL,           [facility]

[facility]



     IV        4-11                               500 ml/hr,           l



               15:15:                               Infuse           [facility]



               00                                 Over: 1           



                                                  hr, Route:           



                                                  IV, 500,           



                                                  Drug form:           



                                                  INJ, ONCE,           



                                                  Priority:           



                                                  STAT,           



                                                  Dosing           



                                                  Weight           



                                                  127.727           



                                                  kg, Start           



                                                  date:           



                                                  22           



                                                  10:15:00           



                                                  CDT, Stop           



                                                  date:           



                                                  22           



                                                  10:15:00           



                                                  CDT, 0           

 

     NS (Bolus)      0      No                       500 mL,           [facility]

[facility]



     IV        4-11                               500 ml/hr,           l



               15:15:                               Infuse           [facility]



               00                                 Over: 1           



                                                  hr, Route:           



                                                  IV, 500,           



                                                  Drug form:           



                                                  INJ, ONCE,           



                                                  Priority:           



                                                  STAT,           



                                                  Dosing           



                                                  Weight           



                                                  127.727           



                                                  kg, Start           



                                                  date:           



                                                  22           



                                                  10:15:00           



                                                  CDT, Stop           



                                                  date:           



                                                  22           



                                                  10:15:00           



                                                  CDT, 0           

 

     NS (Bolus)      0      No                       500 mL,           [facility]

[facility]



     IV        4-11                               500 ml/hr,           l



               15:15:                               Infuse           [facility]



               00                                 Over: 1           



                                                  hr, Route:           



                                                  IV, 500,           



                                                  Drug form:           



                                                  INJ, ONCE,           



                                                  Priority:           



                                                  STAT,           



                                                  Dosing           



                                                  Weight           



                                                  127.727           



                                                  kg, Start           



                                                  date:           



                                                  22           



                                                  10:15:00           



                                                  CDT, Stop           



                                                  date:           



                                                  22           



                                                  10:15:00           



                                                  CDT, 0           

 

     NS (Bolus)      -0      No                       500 mL,           [facility]

[facility]



     IV        4-11                               500 ml/hr,           l



               15:15:                               Infuse           [facility]



               00                                 Over: 1           



                                                  hr, Route:           



                                                  IV, 500,           



                                                  Drug form:           



                                                  INJ, ONCE,           



                                                  Priority:           



                                                  STAT,           



                                                  Dosing           



                                                  Weight           



                                                  127.727           



                                                  kg, Start           



                                                  date:           



                                                  22           



                                                  10:15:00           



                                                  CDT, Stop           



                                                  date:           



                                                  22           



                                                  10:15:00           



                                                  CDT, 0           

 

     NS (Bolus)      0      No                       500 mL,           [facility]

[facility]



     IV        4-11                               500 ml/hr,           l



               15:15:                               Infuse           [facility]



               00                                 Over: 1           



                                                  hr, Route:           



                                                  IV, 500,           



                                                  Drug form:           



                                                  INJ, ONCE,           



                                                  Priority:           



                                                  STAT,           



                                                  Dosing           



                                                  Weight           



                                                  127.727           



                                                  kg, Start           



                                                  date:           



                                                  22           



                                                  10:15:00           



                                                  CDT, Stop           



                                                  date:           



                                                  22           



                                                  10:15:00           



                                                  CDT, 0           

 

     NS (Bolus)      -0      No                       500 mL,           [facility]

[facility]



     IV        4-11                               500 ml/hr,           l



               15:15:                               Infuse           [facility]



               00                                 Over: 1           



                                                  hr, Route:           



                                                  IV, 500,           



                                                  Drug form:           



                                                  INJ, ONCE,           



                                                  Priority:           



                                                  STAT,           



                                                  Dosing           



                                                  Weight           



                                                  127.727           



                                                  kg, Start           



                                                  date:           



                                                  22           



                                                  10:15:00           



                                                  CDT, Stop           



                                                  date:           



                                                  22           



                                                  10:15:00           



                                                  CDT, 0           

 

     NS (Bolus)      -0      No                       500 mL,           [facility]

[facility]



     IV        4-11                               500 ml/hr,           l



               15:15:                               Infuse           [facility]



               00                                 Over: 1           



                                                  hr, Route:           



                                                  IV, 500,           



                                                  Drug form:           



                                                  INJ, ONCE,           



                                                  Priority:           



                                                  STAT,           



                                                  Dosing           



                                                  Weight           



                                                  127.727           



                                                  kg, Start           



                                                  date:           



                                                  22           



                                                  10:15:00           



                                                  CDT, Stop           



                                                  date:           



                                                  22           



                                                  10:15:00           



                                                  CDT, 0           

 

     Dextrose      2022-0      No                       12.5 gm,           [facility]

[facility]



     50% Syringe      4-11                               25 mL,           l



     (D50W)      13:59:                               Route:           



               00                                 IVP, Drug           



                                                  Form: INJ,           



                                                  Dosing           



                                                  Weight           



                                                  127.727,           



                                                  kg, PRN,           



                                                  PRN Blood           



                                                  Glucose           



                                                  Results,           



                                                  Start           



                                                  date:           



                                                  22           



                                                  8:59:00           



                                                  CDT,           



                                                  Duration:           



                                                  30 day,           



                                                  Stop date:           



                                                  22           



                                                  8:58:00           



                                                  CDT, 0           

 

     glucagon      2022-0      No                       1 mg,           [facility]



               4-11                               Route: IM,           l



               13:59:                               Drug form:           [facility]                                 PDR/INJ,           



                                                  PRN,           



                                                  Dosing           



                                                  Weight           



                                                  127.727,           



                                                  kg, PRN           



                                                  Blood           



                                                  Glucose           



                                                  Results,           



                                                  Start           



                                                  date:           



                                                  22           



                                                  8:59:00           



                                                  CDT,           



                                                  Duration:           



                                                  30 day,           



                                                  Stop date:           



                                                  22           



                                                  8:58:00           



                                                  CDT, 0           

 

     insulin      2022-0      No                       Notes:           [facility]



     lispro      4-11                               (Same as:           l



               13:59:                               Humalog)                                            Roll in           



                                                  palms of           



                                                  hands           



                                                  gently; Do           



                                                  not shake           



                                                  vigorously           



                                                  . WASTE:           



                                                  F/P -           



                                                  Black; E -           



                                                  Glam .fr [place]           



                                                  Trash Bin           



                                                  Stable for           



                                                  28 days at           



                                                  room           



                                                  temperatur           



                                                  e. Expires           



                                                  in _____           



                                                  days from           



                                                  __________           



                                                  ____Date           

 

     Dextrose      2-0      No                       12.5 gm,           [facility]

[facility]



     50% Syringe      4-11                               25 mL,           l



     (D50W)      13:59:                               Route:                                            IVP, Drug           



                                                  Form: INJ,           



                                                  Dosing           



                                                  Weight           



                                                  127.727,           



                                                  kg, PRN,           



                                                  PRN Blood           



                                                  Glucose           



                                                  Results,           



                                                  Start           



                                                  date:           



                                                  22           



                                                  8:59:00           



                                                  CDT,           



                                                  Duration:           



                                                  30 day,           



                                                  Stop date:           



                                                  22           



                                                  8:58:00           



                                                  CDT, 0           

 

     glucagon      2022-0      No                       1 mg,           [facility]



               4-11                               Route: IM,           l



               13:59:                               Drug form:           [facility]



               00                                 PDR/INJ,           



                                                  PRN,           



                                                  Dosing           



                                                  Weight           



                                                  127.727,           



                                                  kg, PRN           



                                                  Blood           



                                                  Glucose           



                                                  Results,           



                                                  Start           



                                                  date:           



                                                  22           



                                                  8:59:00           



                                                  CDT,           



                                                  Duration:           



                                                  30 day,           



                                                  Stop date:           



                                                  22           



                                                  8:58:00           



                                                  CDT, 0           

 

     insulin      2022-0      No                       Notes:           [facility]



     lispro      4-11                               (Same as:           l



               13:59:                               Humalog)           [facility]



               00                                 Roll in           



                                                  palms of           



                                                  hands           



                                                  gently; Do           



                                                  not shake           



                                                  vigorously           



                                                  . WASTE:           



                                                  F/P -           



                                                  Black; E -           



                                                  Municipal           



                                                  Trash Bin           



                                                  Stable for           



                                                  28 days at           



                                                  room           



                                                  temperatur           



                                                  e. Expires           



                                                  in _____           



                                                  days from           



                                                  __________           



                                                  ____Date           

 

     Dextrose      -0      No                       12.5 gm,           [facility]

[facility]



     50% Syringe      4-11                               25 mL,           l



     (D50W)      13:59:                               Route:           [facility]



               00                                 IVP, Drug           



                                                  Form: INJ,           



                                                  Dosing           



                                                  Weight           



                                                  127.727,           



                                                  kg, PRN,           



                                                  PRN Blood           



                                                  Glucose           



                                                  Results,           



                                                  Start           



                                                  date:           



                                                  22           



                                                  8:59:00           



                                                  CDT,           



                                                  Duration:           



                                                  30 day,           



                                                  Stop date:           



                                                  22           



                                                  8:58:00           



                                                  CDT, 0           

 

     glucagon      -0      No                       1 mg,           [facility]



               4-11                               Route: IM,           l



               13:59:                               Drug form:           [facility]



               00                                 PDR/INJ,           



                                                  PRN,           



                                                  Dosing           



                                                  Weight           



                                                  127.727,           



                                                  kg, PRN           



                                                  Blood           



                                                  Glucose           



                                                  Results,           



                                                  Start           



                                                  date:           



                                                  22           



                                                  8:59:00           



                                                  CDT,           



                                                  Duration:           



                                                  30 day,           



                                                  Stop date:           



                                                  22           



                                                  8:58:00           



                                                  CDT, 0           

 

     insulin      -0      No                       Notes:           [facility]



     lispro      4-11                               (Same as:           l



               13:59:                               Humalog)                                            Roll in           



                                                  palms of           



                                                  hands           



                                                  gently; Do           



                                                  not shake           



                                                  vigorously           



                                                  . WASTE:           



                                                  F/P -           



                                                  Black; E -           



                                                  Municipal           



                                                  Trash Bin           



                                                  Stable for           



                                                  28 days at           



                                                  room           



                                                  temperatur           



                                                  e. Expires           



                                                  in _____           



                                                  days from           



                                                  __________           



                                                  ____Date           

 

     Dextrose      -0      No                       12.5 gm,           [facility]

[facility]



     50% Syringe      4-11                               25 mL,           l



     (D50W)      13:59:                               Route:           [facility]



               00                                 IVP, Drug           



                                                  Form: INJ,           



                                                  Dosing           



                                                  Weight           



                                                  127.727,           



                                                  kg, PRN,           



                                                  PRN Blood           



                                                  Glucose           



                                                  Results,           



                                                  Start           



                                                  date:           



                                                  22           



                                                  8:59:00           



                                                  CDT,           



                                                  Duration:           



                                                  30 day,           



                                                  Stop date:           



                                                  22           



                                                  8:58:00           



                                                  CDT, 0           

 

     glucagon      -0      No                       1 mg,           [facility]



               4-11                               Route: IM,           l



               13:59:                               Drug form:           [facility]



               00                                 PDR/INJ,           



                                                  PRN,           



                                                  Dosing           



                                                  Weight           



                                                  127.727,           



                                                  kg, PRN           



                                                  Blood           



                                                  Glucose           



                                                  Results,           



                                                  Start           



                                                  date:           



                                                  22           



                                                  8:59:00           



                                                  CDT,           



                                                  Duration:           



                                                  30 day,           



                                                  Stop date:           



                                                  22           



                                                  8:58:00           



                                                  CDT, 0           

 

     insulin      2022-0      No                       Notes:           [facility]



     lispro      4-11                               (Same as:           l



               13:59:                               Humalog)           [facility]



               00                                 Roll in           



                                                  palms of           



                                                  hands           



                                                  gently; Do           



                                                  not shake           



                                                  vigorously           



                                                  . WASTE:           



                                                  F/P -           



                                                  Black; E -           



                                                  Municipal           



                                                  Trash Bin           



                                                  Stable for           



                                                  28 days at           



                                                  room           



                                                  temperatur           



                                                  e. Expires           



                                                  in _____           



                                                  days from           



                                                  __________           



                                                  ____Date           

 

     Dextrose      -0      No                       12.5 gm,           [facility]

[facility]



     50% Syringe      4-11                               25 mL,           l



     (D50W)      13:59:                               Route:           [facility]



               00                                 IVP, Drug           



                                                  Form: INJ,           



                                                  Dosing           



                                                  Weight           



                                                  127.727,           



                                                  kg, PRN,           



                                                  PRN Blood           



                                                  Glucose           



                                                  Results,           



                                                  Start           



                                                  date:           



                                                  22           



                                                  8:59:00           



                                                  CDT,           



                                                  Duration:           



                                                  30 day,           



                                                  Stop date:           



                                                  22           



                                                  8:58:00           



                                                  CDT, 0           

 

     glucagon      202-0      No                       1 mg,           [facility]



               4-11                               Route: IM,           l



               13:59:                               Drug form:           [facility]



               00                                 PDR/INJ,           



                                                  PRN,           



                                                  Dosing           



                                                  Weight           



                                                  127.727,           



                                                  kg, PRN           



                                                  Blood           



                                                  Glucose           



                                                  Results,           



                                                  Start           



                                                  date:           



                                                  22           



                                                  8:59:00           



                                                  CDT,           



                                                  Duration:           



                                                  30 day,           



                                                  Stop date:           



                                                  22           



                                                  8:58:00           



                                                  CDT, 0           

 

     insulin      -0      No                       Notes:           [facility]



     lispro      4-11                               (Same as:           l



               13:59:                               Humalog)           [facility]



               00                                 Roll in           



                                                  palms of           



                                                  hands           



                                                  gently; Do           



                                                  not shake           



                                                  vigorously           



                                                  . WASTE:           



                                                  F/P -           



                                                  Black; E -           



                                                  Municipal           



                                                  Trash Bin           



                                                  Stable for           



                                                  28 days at           



                                                  room           



                                                  temperatur           



                                                  e. Expires           



                                                  in _____           



                                                  days from           



                                                  __________           



                                                  ____Date           

 

     Dextrose      -0      No                       12.5 gm,           [facility]

[facility]



     50% Syringe      4-11                               25 mL,           l



     (D50W)      13:59:                               Route:           [facility]



               00                                 IVP, Drug           



                                                  Form: INJ,           



                                                  Dosing           



                                                  Weight           



                                                  127.727,           



                                                  kg, PRN,           



                                                  PRN Blood           



                                                  Glucose           



                                                  Results,           



                                                  Start           



                                                  date:           



                                                  22           



                                                  8:59:00           



                                                  CDT,           



                                                  Duration:           



                                                  30 day,           



                                                  Stop date:           



                                                  22           



                                                  8:58:00           



                                                  CDT, 0           

 

     glucagon      -0      No                       1 mg,           [facility]



               4-11                               Route: IM,           l



               13:59:                               Drug form:           [facility]



               00                                 PDR/INJ,           



                                                  PRN,           



                                                  Dosing           



                                                  Weight           



                                                  127.727,           



                                                  kg, PRN           



                                                  Blood           



                                                  Glucose           



                                                  Results,           



                                                  Start           



                                                  date:           



                                                  22           



                                                  8:59:00           



                                                  CDT,           



                                                  Duration:           



                                                  30 day,           



                                                  Stop date:           



                                                  22           



                                                  8:58:00           



                                                  CDT, 0           

 

     insulin      -0      No                       Notes:           [facility]



     lispro      4-11                               (Same as:           l



               13:59:                               Humalog)           [facility]                                 Roll in           



                                                  palms of           



                                                  hands           



                                                  gently; Do           



                                                  not shake           



                                                  vigorously           



                                                  . WASTE:           



                                                  F/P -           



                                                  Black; E -           



                                                  Municipal           



                                                  Trash Bin           



                                                  Stable for           



                                                  28 days at           



                                                  room           



                                                  temperatur           



                                                  e. Expires           



                                                  in _____           



                                                  days from           



                                                  __________           



                                                  ____Date           

 

     Dextrose      -0      No                       12.5 gm,           [facility]

[facility]



     50% Syringe      -11                               25 mL,           l



     (D50W)      13:59:                               Route:           [facility]



               00                                 IVP, Drug           



                                                  Form: INJ,           



                                                  Dosing           



                                                  Weight           



                                                  127.727,           



                                                  kg, PRN,           



                                                  PRN Blood           



                                                  Glucose           



                                                  Results,           



                                                  Start           



                                                  date:           



                                                  22           



                                                  8:59:00           



                                                  CDT,           



                                                  Duration:           



                                                  30 day,           



                                                  Stop date:           



                                                  22           



                                                  8:58:00           



                                                  CDT, 0           

 

     glucagon      -0      No                       1 mg,           [facility]



               4-11                               Route: IM,           l



               13:59:                               Drug form:           [facility]



               00                                 PDR/INJ,           



                                                  PRN,           



                                                  Dosing           



                                                  Weight           



                                                  127.727,           



                                                  kg, PRN           



                                                  Blood           



                                                  Glucose           



                                                  Results,           



                                                  Start           



                                                  date:           



                                                  22           



                                                  8:59:00           



                                                  CDT,           



                                                  Duration:           



                                                  30 day,           



                                                  Stop date:           



                                                  22           



                                                  8:58:00           



                                                  CDT, 0           

 

     insulin      -0      No                       Notes:           [facility]



     lispro      4-11                               (Same as:           l



               13:59:                               Humalog)           [facility]                                 Roll in           



                                                  palms of           



                                                  hands           



                                                  gently; Do           



                                                  not shake           



                                                  vigorously           



                                                  . WASTE:           



                                                  F/P -           



                                                  Black; E -           



                                                  Municipal           



                                                  Trash Bin           



                                                  Stable for           



                                                  28 days at           



                                                  room           



                                                  temperatur           



                                                  e. Expires           



                                                  in _____           



                                                  days from           



                                                  __________           



                                                  ____Date           

 

     [facility]            No                       Notes:           [facility]



               4-11                               (Same as:           l



               02:00:                               [facility])           [facility]



                                                               

 

     [facility]            No                       Notes:           [facility]



               4-11                               (Same as:           l



               02:00:                               [facility])           [facility]                                                

 

     [facility]            No                       Notes:           [facility]



               4-11                               (Same as:           l



               02:00:                               [facility])           [facility]                                                

 

     [facility]            No                       Notes:           [facility]



               4-11                               (Same as:           l



               02:00:                               [facility])           [facility]



                                                               

 

     [facility]            No                       Notes:           [facility]



               4-11                               (Same as:           l



               02:00:                               [facility])           [facility]                                                

 

     [facility]            No                       Notes:           [facility]



               4-11                               (Same as:           l



               02:00:                               [facility])           [place]                                                

 

     [facility]            No                       Notes:           [facility]



               4-11                               (Same as:           l



               02:00:                               [facility])                                                           

 

     valproic            No                       Notes:           [facility]



     acid 100      4-10                               Dilute in           l



     mg/mL      17:00:                               at least           [facility]



     intravenous      00                                 50ml D5W           



     solution +                                         or NS.           



     Sodium                                         Infusion           



     Chloride                                         rate = 20           



     0.9% IV 50                                         mg/min           



     mL                                           (Same As:           



                                                  [facility])           



                                                  Hazardous           



                                                  Drug Group           



                                                  3:Reproduc           



                                                  tive risk           



                                                  Hazardous           



                                                  Drug --           



                                                  Refer to           



                                                  safe           



                                                  handling           



                                                  procedure           



                                                  [facility]           

 

     valproic            No                       Notes:           [facility]



     acid 100      4-10                               Dilute in           l



     mg/mL      17:00:                               at least           [facility]



     intravenous      00                                 50ml D5W           



     solution +                                         or NS.           



     Sodium                                         Infusion           



     Chloride                                         rate = 20           



     0.9% IV 50                                         mg/min           



     mL                                           (Same As:           



                                                  [facility])           



                                                  Hazardous           



                                                  Drug Group           



                                                  3:Reproduc           



                                                  tive risk           



                                                  Hazardous           



                                                  Drug --           



                                                  Refer to           



                                                  safe           



                                                  handling           



                                                  procedure           



                                                  [facility]           

 

     valproic            No                       Notes:           [facility]



     acid 100      4-10                               Dilute in           l



     mg/mL      17:00:                               at least           [facility]



     intravenous      00                                 50ml D5W           



     solution +                                         or NS.           



     Sodium                                         Infusion           



     Chloride                                         rate = 20           



     0.9% IV 50                                         mg/min           



     mL                                           (Same As:           



                                                  [facility])           



                                                  Hazardous           



                                                  Drug Group           



                                                  3:Reproduc           



                                                  tive risk           



                                                  Hazardous           



                                                  Drug --           



                                                  Refer to           



                                                  safe           



                                                  handling           



                                                  procedure           



                                                  [facility]           

 

     valproic            No                       Notes:           [facility]



     acid 100      4-10                               Dilute in           l



     mg/mL      17:00:                               at least           [facility]



     intravenous      00                                 50ml D5W           



     solution +                                         or NS.           



     Sodium                                         Infusion           



     Chloride                                         rate = 20           



     0.9% IV 50                                         mg/min           



     mL                                           (Same As:           



                                                  [facility])           



                                                  Hazardous           



                                                  Drug Group           



                                                  3:Reproduc           



                                                  tive risk           



                                                  Hazardous           



                                                  Drug --           



                                                  Refer to           



                                                  safe           



                                                  handling           



                                                  procedure           



                                                  [facility]           

 

     valproic            No                       Notes:           [facility]



     acid 100      4-10                               Dilute in           l



     mg/mL      17:00:                               at least           [facility]



     intravenous      00                                 50ml D5W           



     solution +                                         or NS.           



     Sodium                                         Infusion           



     Chloride                                         rate = 20           



     0.9% IV 50                                         mg/min           



     mL                                           (Same As:           



                                                  [facility])           



                                                  Hazardous           



                                                  Drug Group           



                                                  3:Reproduc           



                                                  tive risk           



                                                  Hazardous           



                                                  Drug --           



                                                  Refer to           



                                                  safe           



                                                  handling           



                                                  procedure           



                                                  [facility]           

 

     valproic            No                       Notes:           [facility]



     acid 100      4-10                               Dilute in           l



     mg/mL      17:00:                               at least           [facility]



     intravenous      00                                 50ml D5W           



     solution +                                         or NS.           



     Sodium                                         Infusion           



     Chloride                                         rate = 20           



     0.9% IV 50                                         mg/min           



     mL                                           (Same As:           



                                                  [facility])           



                                                  Hazardous           



                                                  Drug Group           



                                                  3:Reproduc           



                                                  tive risk           



                                                  Hazardous           



                                                  Drug --           



                                                  Refer to           



                                                  safe           



                                                  handling           



                                                  procedure           



                                                  [facility]           

 

     valproic            No                       Notes:           [facility]



     acid 100      4-10                               Dilute in           l



     mg/mL      17:00:                               at least           [facility]



     intravenous      00                                 50ml D5W           



     solution +                                         or NS.           



     Sodium                                         Infusion           



     Chloride                                         rate = 20           



     0.9% IV 50                                         mg/min           



     mL                                           (Same As:           



                                                  [facility])           



                                                  Hazardous           



                                                  Drug Group           



                                                  3:Reproduc           



                                                  tive risk           



                                                  Hazardous           



                                                  Drug --           



                                                  Refer to           



                                                  safe           



                                                  handling           



                                                  procedure           



                                                  [facility]           

 

     Solu-MEDROL            No                       Notes:           [facility]

ra[facility]ev



               4-10                               (Same           l



               16:43:                               as:Solu-ME           [facility]



               00                                 DROL,           



                                                  [facility]           



                                                  d) ***           



                                                  MEDICATION           



                                                  WASTE ***           



                                                  Product           



                                                  Size: 1000           



                                                  mg Product           



                                                  Wasted:           



                                                  _0__ mg           

 

     magnesium            No                       Notes:           [facility]

a



     sulfate      4-10                               WASTE: F/P           l



               16:43:                               - Sink; E           [facility]                                 -              



                                                  [facility]           



                                                  Trash Bin           

 

     Solu-MEDROL            No                       Notes:           [facility]

[facility]



               4-10                               (Same           l



               16:43:                               as:Solu-ME           [facility]



               00                                 DROL,           



                                                  [facility]           



                                                  d) ***           



                                                  MEDICATION           



                                                  WASTE ***           



                                                  Product           



                                                  Size: 1000           



                                                  mg Product           



                                                  Wasted:           



                                                  _0__ mg           

 

     magnesium            No                       Notes:           [facility]

a



     sulfate      4-10                               WASTE: F/P           l



               16:43:                               - Sink; E           [facility]



               00                                 -              



                                                  Municipal           



                                                  Trash Bin           

 

     Solu-MEDROL            No                       Notes:           [facility]

ra[facility]ev



               4-10                               (Same           l



               16:43:                               as:Solu-ME           [facility]



               00                                 DROL,           



                                                  [facility]           



                                                  d) ***           



                                                  MEDICATION           



                                                  WASTE ***           



                                                  Product           



                                                  Size: 1000           



                                                  mg Product           



                                                  Wasted:           



                                                  _0__ mg           

 

     magnesium            No                       Notes:           [facility]

a



     sulfate      4-10                               WASTE: F/P           l



               16:43:                               - Sink;                                  -              



                                                  Municipal           



                                                  Trash Bin           

 

     Solu-MEDROL            No                       Notes:           [facility]

ra[facility]ev



               4-10                               (Same           l



               16:43:                               as:Solu-ME           [facility]



               00                                 DROL,           



                                                  [facility]           



                                                  d) ***           



                                                  MEDICATION           



                                                  WASTE ***           



                                                  Product           



                                                  Size: 1000           



                                                  mg Product           



                                                  Wasted:           



                                                  _0__ mg           

 

     magnesium            No                       Notes:           [facility]

a



     sulfate      4-10                               WASTE: F/P           l



               16:43:                               - Sink;                                  -              



                                                  Municipal           



                                                  Trash Bin           

 

     Solu-MEDROL            No                       Notes:           [facility]

ra[facility]ev



               4-10                               (Same           l



               16:43:                               as:[facility]           [place]



               00                                 DROL,           



                                                  [facility]-[place]           



                                                  d) ***           



                                                  MEDICATION           



                                                  WASTE ***           



                                                  Product           



                                                  Size: 1000           



                                                  mg Product           



                                                  Wasted:           



                                                  _0__ mg           

 

     magnesium            No                       Notes:           [facility]

a



     sulfate      4-10                               WASTE: F/P           l



               16:43:                               - Sink;                                  -              



                                                  Municipal           



                                                  Trash Bin           

 

     Solu-MEDROL            No                       Notes:           [facility]

ra[facility]



               4-10                               (Same           l



               16:43:                               as:Solu-ME           [place]



               00                                 DROL,           



                                                  [facility]           



                                                  d) ***           



                                                  MEDICATION           



                                                  WASTE ***           



                                                  Product           



                                                  Size: 1000           



                                                  mg Product           



                                                  Wasted:           



                                                  _0__ mg           

 

     magnesium            No                       Notes:           [facility]

a



     sulfate      4-10                               WASTE: F/P           l



               16:43:                               - Sink; [facility]



                                                -              



                                                  Municipal           



                                                  Trash Bin           

 

     Solu-MEDROL            No                       Notes:           [facility]

rajeev



               4-10                               (Same           l



               16:43:                               as:Solu-ME           [place]



               00                                 DROL,           



                                                  [facility]           



                                                  d) ***           



                                                  MEDICATION           



                                                  WASTE ***           



                                                  Product           



                                                  Size: 1000           



                                                  mg Product           



                                                  Wasted:           



                                                  _0__ mg           

 

     magnesium            No                       Notes:           [facility]

a



     sulfate      4-10                               WASTE: F/P           l



               16:43:                               - Sink; E           [facility]



               00                                 -              



                                                  Municipal           



                                                  Trash Bin           

 

     amitriptyli            No                       Notes:           [facility]

[facility]



     ne        4-10                               (Same as:           l



               02:00:                               Elavil)           [facility]                                                

 

     amitriptyli            No                       Notes:           [facility]

[facility]



     ne        4-10                               (Same as:           l



               02:00:                               Elavil)           [facility]                                                

 

     amitriptyli            No                       Notes:           [facility]

[facility]



     ne        4-10                               (Same as:           l



               02:00:                               Elavil)           [facility]                                                

 

     amitriptyli            No                       Notes:           [facility]

[facility]



     ne        4-10                               (Same as:           l



               02:00:                               Elavil)           Jose                                                

 

     amitriptyli            No                       Notes:           [facility]

rajeev



     ne        4-10                               (Same as:           l



               02:00:                               Elavil)           [facility]                                                

 

     amitriptyli            No                       Notes:           [facility]

rajeev



     ne        4-10                               (Same as:           l



               02:00:                               Elavil)           [facility]                                                

 

     amitriptyli            No                       Notes:           [facility]

rajeev



     ne        4-10                               (Same as:           l



               02:00:                               Elavil)           [facility]                                                

 

     SUMAtriptan            No                       Notes:           [facility]

rajeev



               4-09                               (Same As:           l



               18:45:                               Imitrex)                                                           

 

     SUMAtriptan            No                       Notes:           [facility]

rajeev



               4-09                               (Same As:           l



               18:45:                               Imitrex)                                                           

 

     SUMAtriptan            No                       Notes:           [facility]

rajeev



               4-09                               (Same As:           l



               18:45:                               Imitrex)           [facility]                                                

 

     SUMAtriptan            No                       Notes:           [facility]

rajeev



               4-09                               (Same As:           l



               18:45:                               Imitrex)           [facility]                                                

 

     SUMAtriptan            No                       Notes:           [facility]

rajeev



               4-09                               (Same As:           l



               18:45:                               Imitrex)           [facility]                                                

 

     SUMAtriptan            No                       Notes:           [facility]

rajeev



               4-09                               (Same As:           l



               18:45:                               Imitrex)           [facility]                                                

 

     SUMAtriptan            No                       Notes:           [facility]

rajeev



               4-09                               (Same As:           l



               18:45:                               Imitrex)           [facility]



               00                                                

 

     promethazin      2022-0      No                       6.25 mg,           Me

[facility]



     e         -                               Route:           l



               18:44:                               IVPB,           [facility]



               00                                 Q12H,           



                                                  Dosing           



                                                  Weight           



                                                  127.727,           



                                                  kg, PRN           



                                                  Nausea &           



                                                  Vomiting,           



                                                  Start           



                                                  date:           



                                                  22           



                                                  13:44:00           



                                                  CDT,           



                                                  Duration:           



                                                  30 day,           



                                                  Stop date:           



                                                  22           



                                                  13:43:00           



                                                  CDT            

 

     promethazin      2022-0      No                       6.25 mg,           Me

moria



     e         -                               Route:           l



               18:44:                               IVPB,           [facility]



               00                                 Q12H,           



                                                  Dosing           



                                                  Weight           



                                                  127.727,           



                                                  kg, PRN           



                                                  Nausea &           



                                                  Vomiting,           



                                                  Start           



                                                  date:           



                                                  22           



                                                  13:44:00           



                                                  CDT,           



                                                  Duration:           



                                                  30 day,           



                                                  Stop date:           



                                                  22           



                                                  13:43:00           



                                                  CDT            

 

     promethazin      2022-0      No                       6.25 mg,           Me

[facility]



     e         -                               Route:           l



               18:44:                               IVPB,           [facility]



               00                                 Q12H,           



                                                  Dosing           



                                                  Weight           



                                                  127.727,           



                                                  kg, PRN           



                                                  Nausea &           



                                                  Vomiting,           



                                                  Start           



                                                  date:           



                                                  22           



                                                  13:44:00           



                                                  CDT,           



                                                  Duration:           



                                                  30 day,           



                                                  Stop date:           



                                                  22           



                                                  13:43:00           



                                                  CDT            

 

     promethazin      2022-0      No                       6.25 mg,           Me

[facility]



     e         -                               Route:           l



               18:44:                               IVPB,           [facility]



               00                                 Q12H,           



                                                  Dosing           



                                                  Weight           



                                                  127.727,           



                                                  kg, PRN           



                                                  Nausea &           



                                                  Vomiting,           



                                                  Start           



                                                  date:           



                                                  22           



                                                  13:44:00           



                                                  CDT,           



                                                  Duration:           



                                                  30 day,           



                                                  Stop date:           



                                                  22           



                                                  13:43:00           



                                                  CDT            

 

     promethazin      2022-0      No                       6.25 mg,           Me

[facility]



     e         -                               Route:           l



               18:44:                               IVPB,           [facility]



               00                                 Q12H,           



                                                  Dosing           



                                                  Weight           



                                                  127.727,           



                                                  kg, PRN           



                                                  Nausea &           



                                                  Vomiting,           



                                                  Start           



                                                  date:           



                                                  22           



                                                  13:44:00           



                                                  CDT,           



                                                  Duration:           



                                                  30 day,           



                                                  Stop date:           



                                                  22           



                                                  13:43:00           



                                                  CDT            

 

     promethazin      2022-0      No                       6.25 mg,           Me

[facility]



     e         -                               Route:           l



               18:44:                               IVPB,           [facility]



               00                                 Q12H,           



                                                  Dosing           



                                                  Weight           



                                                  127.727,           



                                                  kg, PRN           



                                                  Nausea &           



                                                  Vomiting,           



                                                  Start           



                                                  date:           



                                                  22           



                                                  13:44:00           



                                                  CDT,           



                                                  Duration:           



                                                  30 day,           



                                                  Stop date:           



                                                  22           



                                                  13:43:00           



                                                  CDT            

 

     promethazin            No                       6.25 mg,           Me

[facility]ia



     e         4-09                               Route:           l



               18:44:                               IVPB,           [facility]



               00                                 Q12H,           



                                                  Dosing           



                                                  Weight           



                                                  127.727,           



                                                  kg, PRN           



                                                  Nausea &           



                                                  Vomiting,           



                                                  Start           



                                                  date:           



                                                  22           



                                                  13:44:00           



                                                  CDT,           



                                                  Duration:           



                                                  30 day,           



                                                  Stop date:           



                                                  22           



                                                  13:43:00           



                                                  CDT            

 

     Benadryl            No                       Notes:           [facility]



               4-09                               (Same as:           l



               18:37:                               Benadryl)                                                           

 

     Benadryl            No                       Notes:           [facility]



               4-09                               (Same as:           l



               18:37:                               Benadryl)                                                           

 

     Benadryl            No                       Notes:           [facility]



               4-09                               (Same as:           l



               18:37:                               Benadryl)                                                           

 

     Benadryl            No                       Notes:           [facility]



               4-09                               (Same as:           l



               18:37:                               Benadryl)                                                           

 

     Benadryl            No                       Notes:           [facility]



               4-09                               (Same as:           l



               18:37:                               Benadryl)                                                           

 

     Benadryl            No                       Notes:           [facility]



               4-09                               (Same as:           l



               18:37:                               Benadryl)                                                           

 

     Benadryl            No                       Notes:           [facility]



               4-09                               (Same as:           l



               18:37:                               Benadryl)                                                           

 

     Dilaudid            No                       Notes:           [facility]



               4-09                               (Same as:           l



               18:36:                               Dilaudid)                                                           

 

     Dilaudid            No                       Notes:           [facility]



               4-09                               (Same as:           l



               18:36:                               Dilaudid)                                                           

 

     Dilaudid            No                       Notes:           [facility]



               4-09                               (Same as:           l



               18:36:                               Dilaudid)                                                           

 

     Dilaudid            No                       Notes:           [facility]



               4-09                               (Same as:           l



               18:36:                               Dilaudid)                                                           

 

     Dilaudid            No                       Notes:           [facility]



               4-09                               (Same as:           l



               18:36:                               Dilaudid)           



                                                               

 

     Dilaudid            No                       Notes:           [facility]



               4-09                               (Same as:           l



               18:36:                               Dilaudid)           [facility]



                                                               

 

     Dilaudid            No                       Notes:           [facility]



               4-09                               (Same as:           l



               18:36:                               Dilaudid)           [facility]



                                                               

 

     Phenergan            No                       Notes:           [facility]

a



               4-09                               (Same as:           l



               18:33:                               Phenergan)           [facility]



               00                                 6.25 mg =           



                                                  1/2 x 12.5           



                                                  mg TAB           

 

     Phenergan            No                       Notes:           [facility]

a



               4-09                               (Same as:           l



               18:33:                               Phenergan)           [facility]



               00                                 6.25 mg =           



                                                  1/2 x 12.5           



                                                  mg TAB           

 

     Phenergan            No                       Notes:           [facility]

a



               4-09                               (Same as:           l



               18:33:                               Phenergan)           [facility]



               00                                 6.25 mg =           



                                                  1/2 x 12.5           



                                                  mg TAB           

 

     Phenergan            No                       Notes:           [facility]

a



               4-09                               (Same as:           l



               18:33:                               Phenergan)           [facility]



               00                                 6.25 mg =           



                                                  1/2 x 12.5           



                                                  mg TAB           

 

     Phenergan            No                       Notes:           [facility]

a



               4-09                               (Same as:           l



               18:33:                               Phenergan)           [facility]



               00                                 6.25 mg =           



                                                  1/2 x 12.5           



                                                  mg TAB           

 

     Phenergan            No                       Notes:           [facility]

a



               4-09                               (Same as:           l



               18:33:                               Phenergan)           [facility]



               00                                 6.25 mg =           



                                                  1/2 x 12.5           



                                                  mg TAB           

 

     Phenergan            No                       Notes:           [facility]

a



               4-09                               (Same as:           l



               18:33:                               Phenergan)           [facility]



                                                6.25 mg =           



                                                  1/2 x 12.5           



                                                  mg TAB           

 

     ascorbic            No                       Notes:           [facility]



     acid      4-09                               (Same as:           l



               14:00:                               Vitamin C)           [facility]



                                                               

 

     celecoxib            No                       Notes:           [facility]

a



               4-09                               NSAID.           l



               14:00:                               Please           [facility]



               00                                 check           



                                                  indication           



                                                  . Not for           



                                                  seizure.           



                                                  (Same As:           



                                                  CeleBREX)           

 

     Lidoderm 5%            No                       Notes:           [facility]

rajeev



     topical      -09                               Apply only           l



     film      14:00:                               once for           [facility]



     (patch)      00                                 up to 12           



                                                  hours in a           



                                                  24-hour           



                                                  period (12           



                                                  hours on           



                                                  and 12           



                                                  hours           



                                                  off).           



                                                  (Same as:           



                                                  Aspercreme           



                                                  Lidocaine           



                                                  Patch)           



                                                  "Remove           



                                                  old patch           



                                                  before           



                                                  applicatio           



                                                  n of new           



                                                  patch"           

 

     zinc            No                       Notes:           [facility]



     sulfate      4-09                               (Zinc           l



               14:00:                               sulfate           [facility]



               00                                 capsule) -           



                                                  220 mg           



                                                  Zinc           



                                                  sulfate =           



                                                  50 mg           



                                                  elemental           



                                                  zinc Same           



                                                  as Zinc           



                                                  Sulfate           

 

     ascorbic            No                       Notes:           [facility]



     acid      4-09                               (Same as:           l



               14:00:                               Vitamin C)           [facility]



                                                               

 

     celecoxib            No                       Notes:           [facility]

a



               4-09                               NSAID.           l



               14:00:                               Please           Jose



               00                                 check           



                                                  indication           



                                                  . Not for           



                                                  seizure.           



                                                  (Same As:           



                                                  CeleBREX)           

 

     Lidoderm 5%            No                       Notes:           [facility]

[facility]



     topical      4-09                               Apply only           l



     film      14:00:                               once for           [facility]



     (patch)      00                                 up to 12           



                                                  hours in a           



                                                  24-hour           



                                                  period (12           



                                                  hours on           



                                                  and 12           



                                                  hours           



                                                  off).           



                                                  (Same as:           



                                                  [facility]percre[facility]           



                                                  Lidocaine           



                                                  Patch)           



                                                  "Remove           



                                                  old patch           



                                                  before           



                                                  applicatio           



                                                  n of new           



                                                  patch"           

 

     zinc            No                       Notes:           [facility]



     sulfate      4-09                               (Zinc           l



               14:00:                               sulfate           Jose



               00                                 capsule) -           



                                                  220 mg           



                                                  Zinc           



                                                  sulfate =           



                                                  50 mg           



                                                  elemental           



                                                  zinc Same           



                                                  as Zinc           



                                                  Sulfate           

 

     ascorbic            No                       Notes:           [facility]



     acid      4-09                               (Same as:           l



               14:00:                               Vitamin C)           Jose



                                                               

 

     celecoxib            No                       Notes:           [facility]

a



               4-09                               NSAID.           l



               14:00:                               Please           Jose



               00                                 check           



                                                  indication           



                                                  . Not for           



                                                  seizure.           



                                                  (Same As:           



                                                  CeleBREX)           

 

     Lidoderm 5%            No                       Notes:           [facility]

ra[facility]ev



     topical      4-09                               Apply only           l



     film      14:00:                               once for           [facility]



     (patch)      00                                 up to 12           



                                                  hours in a           



                                                  24-hour           



                                                  period (12           



                                                  hours on           



                                                  and 12           



                                                  hours           



                                                  off).           



                                                  (Same as:           



                                                  [employer]percre[facility]           



                                                  Lidocaine           



                                                  Patch)           



                                                  "Remove           



                                                  old patch           



                                                  before           



                                                  applicatio           



                                                  n of new           



                                                  patch"           

 

     zinc            No                       Notes:           [facility]



     sulfate      4-09                               (Zinc           l



               14:00:                               sulfate           Jose



               00                                 capsule) -           



                                                  220 mg           



                                                  Zinc           



                                                  sulfate =           



                                                  50 mg           



                                                  elemental           



                                                  zinc Same           



                                                  as Zinc           



                                                  Sulfate           

 

     ascorbic            No                       Notes:           [facility]



     acid      4-09                               (Same as:           l



               14:00:                               Vitamin C)           [facility]



               00                                                

 

     celecoxib      0      No                       Notes:           [facility]

a



               4-09                               NSAID.           l



               14:00:                               Please           Jose



               00                                 check           



                                                  indication           



                                                  . Not for           



                                                  seizure.           



                                                  (Same As:           



                                                  CeleBREX)           

 

     Lidoderm 5%      0      No                       Notes:           [facility]

[facility]



     topical      4-09                               Apply only           l



     film      14:00:                               once for           Jose



     (patch)      00                                 up to 12           



                                                  hours in a           



                                                  24-hour           



                                                  period (12           



                                                  hours on           



                                                  and 12           



                                                  hours           



                                                  off).           



                                                  (Same as:           



                                                  [facility]cre[facility]           



                                                  Lidocaine           



                                                  Patch)           



                                                  "Remove           



                                                  old patch           



                                                  before           



                                                  applicatio           



                                                  n of new           



                                                  patch"           

 

     zinc            No                       Notes:           [facility]



     sulfate      4-09                               (Zinc           l



               14:00:                               sulfate           Jose



               00                                 capsule) -           



                                                  220 mg           



                                                  Zinc           



                                                  sulfate =           



                                                  50 mg           



                                                  elemental           



                                                  zinc Same           



                                                  as Zinc           



                                                  Sulfate           

 

     ascorbic            No                       Notes:           [facility]



     acid      4-09                               (Same as:           l



               14:00:                               Vitamin C)           [facility]



               00                                                

 

     celecoxib            No                       Notes:           [facility]

a



               4-09                               NSAID.           l



               14:00:                               Please           Jose



               00                                 check           



                                                  indication           



                                                  . Not for           



                                                  seizure.           



                                                  (Same As:           



                                                  CeleBREX)           

 

     Lidoderm 5%            No                       Notes:           [facility]

[facility]



     topical      4-09                               Apply only           l



     film      14:00:                               once for           [facility]



     (patch)      00                                 up to 12           



                                                  hours in a           



                                                  24-hour           



                                                  period (12           



                                                  hours on           



                                                  and 12           



                                                  hours           



                                                  off).           



                                                  (Same as:           



                                                  [facility]           



                                                  Lidocaine           



                                                  Patch)           



                                                  "Remove           



                                                  old patch           



                                                  before           



                                                  applicatio           



                                                  n of new           



                                                  patch"           

 

     zinc            No                       Notes:           [facility]



     sulfate      4-09                               (Zinc           l



               14:00:                               sulfate           Jose



               00                                 capsule) -           



                                                  220 mg           



                                                  Zinc           



                                                  sulfate =           



                                                  50 mg           



                                                  elemental           



                                                  zinc Same           



                                                  as Zinc           



                                                  Sulfate           

 

     ascorbic            No                       Notes:           [facility]



     acid      4-09                               (Same as:           l



               14:00:                               Vitamin C)           [facility]



               00                                                

 

     celecoxib      -0      No                       Notes:           [facility]

a



               4-09                               NSAID.           l



               14:00:                               Please           Jose



               00                                 check           



                                                  indication           



                                                  . Not for           



                                                  seizure.           



                                                  (Same As:           



                                                  CeleBREX)           

 

     Lidoderm 5%      0      No                       Notes:           [facility]

[facility]



     topical      4-09                               Apply only           l



     film      14:00:                               once for           Jose



     (patch)      00                                 up to 12           



                                                  hours in a           



                                                  24-hour           



                                                  period (12           



                                                  hours on           



                                                  and 12           



                                                  hours           



                                                  off).           



                                                  (Same as:           



                                                  [facility]           



                                                  Lidocaine           



                                                  Patch)           



                                                  "Remove           



                                                  old patch           



                                                  before           



                                                  applicatio           



                                                  n of new           



                                                  patch"           

 

     zinc            No                       Notes:           [facility]



     sulfate      4-09                               (Zinc           l



               14:00:                               sulfate           Jose



               00                                 capsule) -           



                                                  220 mg           



                                                  Zinc           



                                                  sulfate =           



                                                  50 mg           



                                                  elemental           



                                                  zinc Same           



                                                  as Zinc           



                                                  Sulfate           

 

     ascorbic            No                       Notes:           [facility]



     acid      -                               (Same as:           l



               14:00:                               Vitamin C)           [facility]



                                                               

 

     celecoxib            No                       Notes:           [facility]

a



               -                               NSAID.           l



               14:00:                               Please           Jose



               00                                 check           



                                                  indication           



                                                  . Not for           



                                                  seizure.           



                                                  (Same As:           



                                                  CeleBREX)           

 

     Lidoderm 5%            No                       Notes:           [facility]

[facility]



     topical                                     Apply only           l



     film      14:00:                               once for           [facility]



     (patch)      00                                 up to 12           



                                                  hours in a           



                                                  24-hour           



                                                  period (12           



                                                  hours on           



                                                  and 12           



                                                  hours           



                                                  off).           



                                                  (Same as:           



                                                  [facility]           



                                                  Lidocaine           



                                                  Patch)           



                                                  "Remove           



                                                  old patch           



                                                  before           



                                                  applicatio           



                                                  n of new           



                                                  patch"           

 

     zinc            No                       Notes:           [facility]



     sulfate                                     (Zinc           l



               14:00:                               sulfate           [facility]



                                                capsule) -           



                                                  220 mg           



                                                  Zinc           



                                                  sulfate =           



                                                  50 mg           



                                                  elemental           



                                                  zinc Same           



                                                  as Zinc           



                                                  Sulfate           

 

     cefepime +            No                       Notes:           [facility]

ia



     sterile                                     (Same As:           l



     water 10 mL      12:00:                               Maxipime)           H

ermann



               00                                 ***            



                                                  MEDICATION           



                                                  WASTE ***           



                                                  Product           



                                                  Size: 1000           



                                                  mg Product           



                                                  Wasted:           



                                                  _0__ mg           

 

     cefepime +            No                       Notes:           [facility]

[facility]



     sterile      -                               (Same As:           l



     water 10 mL      12:00:                               Maxipime)           H

ermann



               00                                 ***            



                                                  MEDICATION           



                                                  WASTE ***           



                                                  Product           



                                                  Size: 1000           



                                                  mg Product           



                                                  Wasted:           



                                                  _0__ mg           

 

     cefepime +            No                       Notes:           [facility]

[facility]



     sterile      -                               (Same As:           l



     water 10 mL      12:00:                               Maxipime)           H

ermann



               00                                 ***            



                                                  MEDICATION           



                                                  WASTE ***           



                                                  Product           



                                                  Size: 1000           



                                                  mg Product           



                                                  Wasted:           



                                                  _0__ mg           

 

     cefepime +            No                       Notes:           [facility]

[facility]



     sterile      -                               (Same As:           l



     water 10 mL      12:00:                               Maxipime)           H

ermann



               00                                 ***            



                                                  MEDICATION           



                                                  WASTE ***           



                                                  Product           



                                                  Size: 1000           



                                                  mg Product           



                                                  Wasted:           



                                                  _0__ mg           

 

     cefepime +      0      No                       Notes:           [facility]

[facility]



     sterile      -                               (Same As:           l



     water 10 mL      12:00:                               Maxipime)           H

ermann



               00                                 ***            



                                                  MEDICATION           



                                                  WASTE ***           



                                                  Product           



                                                  Size: 1000           



                                                  mg Product           



                                                  Wasted:           



                                                  _0__ mg           

 

     cefepime +      -      No                       Notes:           [facility]

[facility]



     sterile      4-09                               (Same As:           l



     water 10 mL      12:00:                               Maxipime)           H

erm



               00                                 ***            



                                                  MEDICATION           



                                                  WASTE ***           



                                                  Product           



                                                  Size: 1000           



                                                  mg Product           



                                                  Wasted:           



                                                  _0__ mg           

 

     cefepime +      0      No                       Notes:           [facility]

[facility]



     sterile      4-                               (Same As:           l



     water 10 mL      12:00:                               Maxipime)           H

erm



               00                                 ***            



                                                  MEDICATION           



                                                  WASTE ***           



                                                  Product           



                                                  Size: 1000           



                                                  mg Product           



                                                  Wasted:           



                                                  _0__ mg           

 

     hydromorpho            No                       Notes:           [facility]

[facility]



     ne        4-                               (Same as:           l



               09:39:                               Dilaudid)           [facility]



                                                               

 

     hydromorpho            No                       Notes:           [facility]

[facility]



     ne        4-                               (Same as:           l



               09:39:                               Dilaudid)           [facility]



                                                               

 

     hydromorpho            No                       Notes:           [facility]

[facility]



     ne        4-                               (Same as:           l



               09:39:                               Dilaudid)           [facility]



                                                               

 

     hydromorpho            No                       Notes:           [facility]

[facility]



     ne        4-09                               (Same as:           l



               09:39:                               Dilaudid)           [facility]



                                                               

 

     hydromorpho      -0      No                       Notes:           [facility]

[facility]



     ne        4-09                               (Same as:           l



               09:39:                               Dilaudid)           [facility]



                                                               

 

     hydromorpho            No                       Notes:           [facility]

[facility]



     ne        4-09                               (Same as:           l



               09:39:                               Dilaudid)           [facility]



                                                               

 

     hydromorpho      0      No                       Notes:           [facility]

[facility]



     ne        4-09                               (Same as:           l



               09:39:                               Dilaudid)           [facility]



                                                               

 

     methocarbam      -0      No                       Notes:           [facility]

[facility]



     ol        4-09                               (Same           l



               05:00:                               as:Robaxin           [facility]



               00                                 )              

 

     methocarbam      -0      No                       Notes:           [facility]

[facility]



     ol        4-09                               (Same           l



               05:00:                               as:Robaxin           [facility]



               00                                 )              

 

     methocarbam      -0      No                       Notes:           [facility]

[facility]



     ol        4-09                               (Same           l



               05:00:                               as:Robaxin           [facility]



               00                                 )              

 

     methocarbam      0      No                       Notes:           [facility]

[facility]



     ol        4-                               (Same           l



               05:00:                               as:Robaxin           [facility]



               00                                 )              

 

     methocarbam      -0      No                       Notes:           [facility]

[facility]



     ol        4-09                               (Same           l



               05:00:                               as:Robaxin           [facility]



               00                                 )              

 

     methocarbam            No                       Notes:           [facility]

[facility]



     ol        4-09                               (Same           l



               05:00:                               as:Robaxin           [facility]



               00                                 )              

 

     methocarbam            No                       Notes:           [facility]

[facility]



     ol        4-09                               (Same           l



               05:00:                               as:Robaxin           [facility]



               00                                 )              

 

     docusate            No                       Notes:           [facility]



               4-09                               (Same as:           l



               02:00:                               Colace)           [facility]



               00                                 (Do Not           



                                                  Crush)           

 

     senna            No                       Notes:           [facility]



               4-09                               (Same as:           l



               02:00:                               Senokot)           [facility]



               00                                                

 

     Saline            No                       Notes:           [facility]



     Flush 0.9%      4-09                               (Same as:           l



               02:00:                               BD             [facility]



               00                                 Posiflush)           

 

     topiramate            No                       Notes:           [facility]

[facility]



               4-09                               (Same As:           l



               02:00:                               Topamax)           [facility]



               00                                 "Do Not           



                                                  Crush"           



                                                  Hazardous           



                                                  Drug Group           



                                                  3:Reproduc           



                                                  tive risk           



                                                  Hazardous           



                                                  Drug --           



                                                  Refer to           



                                                  safe           



                                                  handling           



                                                  procedure           



                                                  PPE Matrix           

 

     remove            No                       Notes:           [facility]



     patch      4-09                               Remove           l



               02:00:                               patch 12           [facility]



               00                                 hours           



                                                  after           



                                                  applicatio           



                                                  n each           



                                                  day.           

 

     docusate            No                       Notes:           [facility]



               4-09                               (Same as:           l



               02:00:                               Colace)           [facility]



               00                                 (Do Not           



                                                  Crush)           

 

     senna            No                       Notes:           [facility]



               4-09                               (Same as:           l



               02:00:                               Senokot)           [facility]



               00                                                

 

     Saline            No                       Notes:           [facility]



     Flush 0.9%      4-09                               (Same as:           l



               02:00:                               BD             [facility]



               00                                 Posiflush)           

 

     topiramate            No                       Notes:           [facility]

[facility]



               4-09                               (Same As:           l



               02:00:                               Topamax)           [facility]



               00                                 "Do Not           



                                                  Crush"           



                                                  Hazardous           



                                                  Drug Group           



                                                  3:Reproduc           



                                                  tive risk           



                                                  Hazardous           



                                                  Drug --           



                                                  Refer to           



                                                  safe           



                                                  handling           



                                                  procedure           



                                                  PPE Matrix           

 

     remove            No                       Notes:           [facility]



     patch      4-09                               Remove           l



               02:00:                               patch 12           [facility]



               00                                 hours           



                                                  after           



                                                  applicatio           



                                                  n each           



                                                  day.           

 

     docusate            No                       Notes:           [facility]



               4-09                               (Same as:           l



               02:00:                               Colace)           [facility]



               00                                 (Do Not           



                                                  Crush)           

 

     senna            No                       Notes:           [facility]



               4-09                               (Same as:           l



               02:00:                               Senokot)           [facility]



               00                                                

 

     Saline            No                       Notes:           [facility]



     Flush 0.9%      4-09                               (Same as:           l



               02:00:                               BD             [facility]



               00                                 Posiflush)           

 

     topiramate            No                       Notes:           [facility]

[facility]



               4-09                               (Same As:           l



               02:00:                               Topamax)           [facility]



               00                                 "Do Not           



                                                  Crush"           



                                                  Hazardous           



                                                  Drug Group           



                                                  3:Reproduc           



                                                  tive risk           



                                                  Hazardous           



                                                  Drug --           



                                                  Refer to           



                                                  safe           



                                                  handling           



                                                  procedure           



                                                  PPE Matrix           

 

     remove            No                       Notes:           [facility]



     patch      4-09                               Remove           l



               02:00:                               patch 12           [facility]



               00                                 hours           



                                                  after           



                                                  applicatio           



                                                  n each           



                                                  day.           

 

     docusate            No                       Notes:           [facility]



               4-09                               (Same as:           l



               02:00:                               Colace)           [facility]



               00                                 (Do Not           



                                                  Crush)           

 

     senna            No                       Notes:           [facility]



               4-09                               (Same as:           l



               02:00:                               Senokot)           [facility]



               00                                                

 

     Saline            No                       Notes:           [facility]



     Flush 0.9%      4-09                               (Same as:           l



               02:00:                               BD             [facility]



               00                                 Posiflush)           

 

     topiramate            No                       Notes:           [facility]

[facility]



               4-09                               (Same As:           l



               02:00:                               Topamax)           [facility]



               00                                 "Do Not           



                                                  Crush"           



                                                  Hazardous           



                                                  Drug Group           



                                                  3:Reproduc           



                                                  tive risk           



                                                  Hazardous           



                                                  Drug --           



                                                  Refer to           



                                                  safe           



                                                  handling           



                                                  procedure           



                                                  PPE Matrix           

 

     remove            No                       Notes:           [facility]



     patch      4-09                               Remove           l



               02:00:                               patch 12           [facility]



               00                                 hours           



                                                  after           



                                                  applicatio           



                                                  n each           



                                                  day.           

 

     docusate            No                       Notes:           [facility]



               4-09                               (Same as:           l



               02:00:                               Colace)           [facility]



               00                                 (Do Not           



                                                  Crush)           

 

     senna            No                       Notes:           [facility]



               4-09                               (Same as:           l



               02:00:                               Senokot)           [facility]



               00                                                

 

     Saline            No                       Notes:           [facility]



     Flush 0.9%      4-09                               (Same as:           l



               02:00:                               BD             [facility]



               00                                 Posiflush)           

 

     topiramate            No                       Notes:           [facility]

[facility]



               4-09                               (Same As:           l



               02:00:                               Topamax)           [facility]



               00                                 "Do Not           



                                                  Crush"           



                                                  Hazardous           



                                                  Drug Group           



                                                  3:Reproduc           



                                                  tive risk           



                                                  Hazardous           



                                                  Drug --           



                                                  Refer to           



                                                  safe           



                                                  handling           



                                                  procedure           



                                                  PPE Matrix           

 

     remove            No                       Notes:           [facility]



     patch      4-09                               Remove           l



               02:00:                               patch 12           [facility]



               00                                 hours           



                                                  after           



                                                  applicatio           



                                                  n each           



                                                  day.           

 

     docusate            No                       Notes:           [facility]



               4-09                               (Same as:           l



               02:00:                               Colace)           [facility]



               00                                 (Do Not           



                                                  Crush)           

 

     senna            No                       Notes:           [facility]



               4-09                               (Same as:           l



               02:00:                               Senokot)           [facility]



               00                                                

 

     Saline            No                       Notes:           [facility]



     Flush 0.9%      4-09                               (Same as:           l



               02:00:                               BD             [facility]



               00                                 Posiflush)           

 

     topiramate            No                       Notes:           [facility]

[facility]



               4-09                               (Same As:           l



               02:00:                               Topamax)           [facility]



               00                                 "Do Not           



                                                  Crush"           



                                                  Hazardous           



                                                  Drug Group           



                                                  3:Reproduc           



                                                  tive risk           



                                                  Hazardous           



                                                  Drug --           



                                                  Refer to           



                                                  safe           



                                                  handling           



                                                  procedure           



                                                  PPE Matrix           

 

     remove            No                       Notes:           [facility]



     patch      4-09                               Remove           l



               02:00:                               patch 12           [facility]



               00                                 hours           



                                                  after           



                                                  applicatio           



                                                  n each           



                                                  day.           

 

     docusate            No                       Notes:           [facility]



               4-09                               (Same as:           l



               02:00:                               Colace)           [facility]



               00                                 (Do Not           



                                                  Crush)           

 

     senna            No                       Notes:           [facility]



               4-09                               (Same as:           l



               02:00:                               Senokot)           [facility]



               00                                                

 

     Saline            No                       Notes:           [facility]



     Flush 0.9%      4-09                               (Same as:           l



               02:00:                               BD             [facility]



               00                                 Posiflush)           

 

     topiramate            No                       Notes:           [facility]

[facility]



               4-09                               (Same As:           l



               02:00:                               Topamax)           [facility]



               00                                 "Do Not           



                                                  Crush"           



                                                  Hazardous           



                                                  Drug Group           



                                                  3:Reproduc           



                                                  tive risk           



                                                  Hazardous           



                                                  Drug --           



                                                  Refer to           



                                                  safe           



                                                  handling           



                                                  procedure           



                                                  PPE Matrix           

 

     remove            No                       Notes:           [facility]



     patch      4-09                               Remove           l



               02:00:                               patch 12           [facility]



               00                                 hours           



                                                  after           



                                                  applicatio           



                                                  n each           



                                                  day.           

 

     acetaminoph            No                       Notes: Max           

[facility]



     en        4-09                               acetaminop           l



               01:00:                               hen 4000           [facility]



               00                                 mg/day (4           



                                                  gm/day).           



                                                  (Same as:           



                                                  Tylenol           



                                                  Extra           



                                                  Strength)           

 

     acetaminoph            No                       Notes: Max           

[facility]



     en        4-09                               acetaminop           l



               01:00:                               hen 4000           [facility]



               00                                 mg/day (4           



                                                  gm/day).           



                                                  (Same as:           



                                                  Tylenol           



                                                  Extra           



                                                  Strength)           

 

     acetaminoph            No                       Notes: Max           

[facility]



     en        4-09                               acetaminop           l



               01:00:                               hen 4000           [facility]



               00                                 mg/day (4           



                                                  gm/day).           



                                                  (Same as:           



                                                  Tylenol           



                                                  Extra           



                                                  Strength)           

 

     acetaminoph            No                       Notes: Max           

[facility]



     en        4-                               acetaminop           l



               01:00:                               hen 4000           [facility]



               00                                 mg/day (4           



                                                  gm/day).           



                                                  (Same as:           



                                                  Tylenol           



                                                  Extra           



                                                  Strength)           

 

     acetaminoph            No                       Notes: Max           

[facility]



     en        4-                               acetaminop           l



               01:00:                               hen 4000           [facility]



               00                                 mg/day (4           



                                                  gm/day).           



                                                  (Same as:           



                                                  Tylenol           



                                                  Extra           



                                                  Strength)           

 

     acetaminoph            No                       Notes: Max           

[facility]



     en        -                               acetaminop           l



               01:00:                               hen 4000           [facility]



               00                                 mg/day (4           



                                                  gm/day).           



                                                  (Same as:           



                                                  Tylenol           



                                                  Extra           



                                                  Strength)           

 

     acetaminoph            No                       Notes: Max           

[facility]



     en        -                               acetaminop           l



               01:00:                               hen 4000           [facility]



               00                                 mg/day (4           



                                                  gm/day).           



                                                  (Same as:           



                                                  Tylenol           



                                                  Extra           



                                                  Strength)           

 

     oxyCODONE            No                       Notes:           [facility]

a



     immediate      -                               (Same as:           l



     release      00:09:                               Roxicodone           [facility]

[facility]



               00                                 )              

 

     acetaminoph            No                       Notes: Do           M

[facility]



     en-hydrocod      -                               not exceed           l



     one 325      00:09:                               4gm/day of           [facility]

[facility]



     mg-10 mg      00                                 acetaminop           



     oral tablet                                         hen. (Same           



                                                  as: Norco           



                                                  325/10)           

 

     oxyCODONE      0      No                       Notes:           [facility]

a



     immediate      4-                               (Same as:           l



     release      00:09:                               Roxicodone           [facility]

[facility]



               00                                 )              

 

     acetaminoph            No                       Notes: Do           M

[facility]



     en-hydrocod      4-                               not exceed           l



     one 325      00:09:                               4gm/day of           [facility]

[facility]



     mg-10 mg      00                                 acetaminop           



     oral tablet                                         hen. (Same           



                                                  as: Norco           



                                                  325/10)           

 

     oxyCODONE      0      No                       Notes:           [facility]

a



     immediate      4-                               (Same as:           l



     release      00:09:                               Roxicodone           [facility]

[facility]



               00                                 )              

 

     acetaminoph            No                       Notes: Do           M

[facility]



     en-hydrocod      4-09                               not exceed           l



     one 325      00:09:                               4gm/day of           [facility]

[facility]



     mg-10 mg      00                                 acetaminop           



     oral tablet                                         hen. (Same           



                                                  as: Norco           



                                                  325/10)           

 

     oxyCODONE            No                       Notes:           [facility]

a



     immediate      4-09                               (Same as:           l



     release      00:09:                               Roxicodone           [facility]

[facility]



               00                                 )              

 

     acetaminoph            No                       Notes: Do           M

[facility]



     en-hydrocod      4-09                               not exceed           l



     one 325      00:09:                               4gm/day of           [facility]

[facility]



     mg-10 mg      00                                 acetaminop           



     oral tablet                                         hen. (Same           



                                                  as: Norco           



                                                  325/10)           

 

     oxyCODONE            No                       Notes:           [facility]

a



     immediate      4-09                               (Same as:           l



     release      00:09:                               Roxicodone           [facility]

[facility]



               00                                 )              

 

     acetaminoph            No                       Notes: Do           M

[facility]



     en-hydrocod      4-09                               not exceed           l



     one 325      00:09:                               4gm/day of           [facility]

[facility]



     mg-10 mg      00                                 acetaminop           



     oral tablet                                         hen. (Same           



                                                  as: Norco           



                                                  325/10)           

 

     oxyCODONE            No                       Notes:           [facility]

a



     immediate      4-09                               (Same as:           l



     release      00:09:                               Roxicodone           [facility]

[facility]



               00                                 )              

 

     acetaminoph            No                       Notes: Do           M

[facility]



     en-hydrocod      4-09                               not exceed           l



     one 325      00:09:                               4gm/day of           [facility]

[facility]



     mg-10 mg      00                                 acetaminop           



     oral tablet                                         hen. (Same           



                                                  as: Norco           



                                                  325/10)           

 

     oxyCODONE      0      No                       Notes:           [facility]

a



     immediate      4-09                               (Same as:           l



     release      00:09:                               Roxicodone           [facility]

[facility]



               00                                 )              

 

     acetaminoph            No                       Notes: Do           M

[facility]



     en-hydrocod      4-09                               not exceed           l



     one 325      00:09:                               4gm/day of           [facility]

[facility]



     mg-10 mg      00                                 acetaminop           



     oral tablet                                         hen. (Same           



                                                  as: Norco           



                                                  325/10)           

 

     LORazepam            No                       Notes:           [facility]

a



               4-09                               (Same as:           l



               00:06:                               Ativan)           [facility]



               00                                                

 

     oxyCODONE            No                       Notes:           [facility]

a



     immediate      4-09                               (Same as:           l



     release      00:06:                               Roxicodone           [facility]

[facility]



               00                                 )              

 

     LORazepam            No                       Notes:           [facility]

a



               4-09                               (Same as:           l



               00:06:                               Ativan)           [facility]



                                                               

 

     oxyCODONE            No                       Notes:           [facility]

a



     immediate      4-09                               (Same as:           l



     release      00:06:                               Roxicodone           [facility]

[facility]



               00                                 )              

 

     LORazepam            No                       Notes:           [facility]

a



               4-09                               (Same as:           l



               00:06:                               Ativan)           [facility]



                                                               

 

     oxyCODONE            No                       Notes:           [facility]

a



     immediate      4-09                               (Same as:           l



     release      00:06:                               Roxicodone           [facility]

[facility]



               00                                 )              

 

     LORazepam            No                       Notes:           [facility]

a



               4-09                               (Same as:           l



               00:06:                               Ativan)           [facility]



                                                               

 

     oxyCODONE            No                       Notes:           [facility]

a



     immediate      4-09                               (Same as:           l



     release      00:06:                               Roxicodone           [facility]

[facility]



               00                                 )              

 

     LORazepam            No                       Notes:           [facility]

a



               4-09                               (Same as:           l



               00:06:                               Ativan)           [facility]



                                                               

 

     oxyCODONE            No                       Notes:           [facility]

a



     immediate      4-09                               (Same as:           l



     release      00:06:                               Roxicodone           [facility]

[facility]



               00                                 )              

 

     LORazepam            No                       Notes:           [facility]

a



               4-09                               (Same as:           l



               00:06:                               Ativan)           [facility]



                                                               

 

     oxyCODONE            No                       Notes:           [facility]

a



     immediate      4-09                               (Same as:           l



     release      00:06:                               Roxicodone           [facility]

[facility]



               00                                 )              

 

     LORazepam            No                       Notes:           [facility]

a



               4-09                               (Same as:           l



               00:06:                               Ativan)           [facility]



                                                               

 

     oxyCODONE            No                       Notes:           [facility]

a



     immediate      4-09                               (Same as:           l



     release      00:06:                               Roxicodone           [facility]

[facility]



               00                                 )              

 

     Sodium            No                       1,000 mL,           [facility]

a



     Chloride      4-08                               Rate: 75           l



     0.9% IV      23:43:                               ml/hr,           [facility]



     1,000 mL      00                                 Infuse           



                                                  over: 13.3           



                                                  hr, Route:           



                                                  IV, Dosing           



                                                  Weight           



                                                  127.727           



                                                  kg, Total           



                                                  Volume:           



                                                  1,000,           



                                                  Start           



                                                  date:           



                                                  22           



                                                  18:43:00           



                                                  CDT,           



                                                  Duration:           



                                                  30 day,           



                                                  Stop date:           



                                                  22           



                                                  18:42:00           



                                                  CDT, BSA:           



                                                  2.37 m2, 0           

 

     acetaminoph            No                       Notes: Do           M

[facility]



     en        4-08                               not exceed           l



               23:43:                               4 gm/day.           [facility]



               00                                 (Same as:           



                                                  Tylenol)           

 

     acetaminoph            No                       Notes:           [facility]

[facility]



     en-hydrocod      4-08                               (Same as:           l



     one 325      23:43:                               Norco           [facility]



     mg-5 mg      00                                 325/5) Do           



     oral tablet                                         not exceed           



                                                  4gm/day of           



                                                  acetaminop           



                                                  hen.           

 

     acetaminoph            No                       Notes: Do           M

[facility]



     en-hydrocod      4-08                               not exceed           l



     one 325      23:43:                               4gm/day of           [facility]

[facility]



     mg-10 mg      00                                 acetaminop           



     oral tablet                                         hen. (Same           



                                                  as: Norco           



                                                  325/10)           

 

     bisacodyl            No                       Notes:           [facility]

a



               4-08                               (Same As:           l



               23:43:                               Dulcolax,           [facility]



                                                Bisco-Lax)           

 

     hydrALAZINE            No                       Notes:           [facility]

[facility]



               4-08                               (Same as:           l



               23:43:                               Apresoline           Jose                                 ) Push           



                                                  over 5           



                                                  minutes           

 

     labetalol            No                       10 mg, 2           [facility]

[facility]



               4-08                               mL, Route:           l



               23:43:                               IVP, Drug                                            form: INJ,           



                                                  Q15Min,           



                                                  Dosing           



                                                  Weight           



                                                  127.727,           



                                                  kg, Start           



                                                  date:           



                                                  22           



                                                  18:43:00           



                                                  CDT,           



                                                  Duration:           



                                                  3 doses or           



                                                  times,           



                                                  Stop date:           



                                                  22           



                                                  19:13:00           



                                                  CDT, 0           

 

     Saline            No                       Notes:           [facility]



     Flush 0.9%      -08                               (Same as:           l



               23:43:                               BD             [facility]                                 Posiflush)           

 

     Sodium            No                       1,000 mL,           [facility]

a



     Chloride      -08                               Rate: 75           l



     0.9% IV      23:43:                               ml/hr,           [facility]



     1,000 mL      00                                 Infuse           



                                                  over: 13.3           



                                                  hr, Route:           



                                                  IV, Dosing           



                                                  Weight           



                                                  127.727           



                                                  kg, Total           



                                                  Volume:           



                                                  1,000,           



                                                  Start           



                                                  date:           



                                                  22           



                                                  18:43:00           



                                                  CDT,           



                                                  Duration:           



                                                  30 day,           



                                                  Stop date:           



                                                  22           



                                                  18:42:00           



                                                  CDT, BSA:           



                                                  2.37 m2, 0           

 

     acetaminoph            No                       Notes: Do           M

[facility]



     en        4-08                               not exceed           l



               23:43:                               4 gm/day.           [facility]



               00                                 (Same as:           



                                                  Tylenol)           

 

     acetaminoph            No                       Notes:           [facility]

[facility]



     en-hydrocod      4-08                               (Same as:           l



     one 325      23:43:                               Norco           [facility]



     mg-5 mg      00                                 325/5) Do           



     oral tablet                                         not exceed           



                                                  4gm/day of           



                                                  acetaminop           



                                                  hen.           

 

     acetaminoph            No                       Notes: Do           M

[facility]



     en-hydrocod      4-08                               not exceed           l



     one 325      23:43:                               4gm/day of           [facility]

[facility]



     mg-10 mg      00                                 acetaminop           



     oral tablet                                         hen. (Same           



                                                  as: Norco           



                                                  325/10)           

 

     bisacodyl            No                       Notes:           [facility]

a



               4-08                               (Same As:           l



               23:43:                               Dulcolax,           Jose



                                                Bisco-Lax)           

 

     hydrALAZINE            No                       Notes:           [facility]

rajeev



               4-08                               (Same as:           l



               23:43:                               Apresoline           Roe                                 ) Push           



                                                  over 5           



                                                  minutes           

 

     labetalol            No                       10 mg, 2           [facility]

rajeev



               4-08                               mL, Route:           l



               23:43:                               IVP, Drug                                            form: INJ,           



                                                  Q15Min,           



                                                  Dosing           



                                                  Weight           



                                                  127.727,           



                                                  kg, Start           



                                                  date:           



                                                  22           



                                                  18:43:00           



                                                  CDT,           



                                                  Duration:           



                                                  3 doses or           



                                                  times,           



                                                  Stop date:           



                                                  22           



                                                  19:13:00           



                                                  CDT, 0           

 

     Saline            No                       Notes:           [facility]



     Flush 0.9%      08                               (Same as:           l



               23:43:                               BD             Jose                                 Posiflush)           

 

     Sodium            No                       1,000 mL,           [facility]

a



     Chloride      -08                               Rate: 75           l



     0.9% IV      23:43:                               ml/hr,           Jose



     1,000 mL      00                                 Infuse           



                                                  over: 13.3           



                                                  hr, Route:           



                                                  IV, Dosing           



                                                  Weight           



                                                  127.727           



                                                  kg, Total           



                                                  Volume:           



                                                  1,000,           



                                                  Start           



                                                  date:           



                                                  22           



                                                  18:43:00           



                                                  CDT,           



                                                  Duration:           



                                                  30 day,           



                                                  Stop date:           



                                                  22           



                                                  18:42:00           



                                                  CDT, BSA:           



                                                  2.37 m2, 0           

 

     acetaminoph            No                       Notes: Do           M

[facility]



     en        4-08                               not exceed           l



               23:43:                               4 gm/day.           Roe



               00                                 (Same as:           



                                                  Tylenol)           

 

     acetaminoph            No                       Notes:           [facility]

rajeev



     en-hydrocod      4-08                               (Same as:           l



     one 325      23:43:                               Norco           Roe



     mg-5 mg      00                                 325/5) Do           



     oral tablet                                         not exceed           



                                                  4gm/day of           



                                                  acetaminop           



                                                  hen.           

 

     acetaminoph            No                       Notes: Do           M

[facility]



     en-hydrocod      4-08                               not exceed           l



     one 325      23:43:                               4gm/day of           [facility]

[facility]



     mg-10 mg      00                                 acetaminop           



     oral tablet                                         hen. (Same           



                                                  as: Norco           



                                                  325/10)           

 

     bisacodyl            No                       Notes:           [facility]

a



               4-08                               (Same As:           l



               23:43:                               Dulcolax,           Roe



                                                Bisco-Lax)           

 

     hydrALAZINE            No                       Notes:           [facility]

rajeev



               4-08                               (Same as:           l



               23:43:                               Apresoline                                            ) Push           



                                                  over 5           



                                                  minutes           

 

     labetalol            No                       10 mg, 2           [facility]

rajeev



               4-08                               mL, Route:           l



               23:43:                               IVP, Drug                                            form: INJ,           



                                                  Q15Min,           



                                                  Dosing           



                                                  Weight           



                                                  127.727,           



                                                  kg, Start           



                                                  date:           



                                                  22           



                                                  18:43:00           



                                                  CDT,           



                                                  Duration:           



                                                  3 doses or           



                                                  times,           



                                                  Stop date:           



                                                  22           



                                                  19:13:00           



                                                  CDT, 0           

 

     Saline            No                       Notes:           [facility]



     Flush 0.9%      08                               (Same as:           l



               23:43:                               BD             Jose                                 Posiflush)           

 

     Sodium            No                       1,000 mL,           [facility]

a



     Chloride      08                               Rate: 75           l



     0.9% IV      23:43:                               ml/hr,           Roe



     1,000 mL      00                                 Infuse           



                                                  over: 13.3           



                                                  hr, Route:           



                                                  IV, Dosing           



                                                  Weight           



                                                  127.727           



                                                  kg, Total           



                                                  Volume:           



                                                  1,000,           



                                                  Start           



                                                  date:           



                                                  22           



                                                  18:43:00           



                                                  CDT,           



                                                  Duration:           



                                                  30 day,           



                                                  Stop date:           



                                                  22           



                                                  18:42:00           



                                                  CDT, BSA:           



                                                  2.37 m2, 0           

 

     acetaminoph            No                       Notes: Do           M

[facility]



     en        4-08                               not exceed           l



               23:43:                               4 gm/day.           Jose



               00                                 (Same as:           



                                                  Tylenol)           

 

     acetaminoph            No                       Notes:           [facility]

rajeev



     en-hydrocod      4-08                               (Same as:           l



     one 325      23:43:                               Norco           Roe



     mg-5 mg      00                                 325/5) Do           



     oral tablet                                         not exceed           



                                                  4gm/day of           



                                                  acetaminop           



                                                  hen.           

 

     acetaminoph            No                       Notes: Do           M

[facility]



     en-hydrocod      4-08                               not exceed           l



     one 325      23:43:                               4gm/day of           [facility]

[facility]



     mg-10 mg      00                                 acetaminop           



     oral tablet                                         hen. (Same           



                                                  as: Norco           



                                                  325/10)           

 

     bisacodyl            No                       Notes:           [facility]

a



               4-08                               (Same As:           l



               23:43:                               Dulcolax,           Roe



                                                Bisco-Lax)           

 

     hydrALAZINE            No                       Notes:           [facility]

rajeev



               4-08                               (Same as:           l



               23:43:                               Apresoline                                            ) Push           



                                                  over 5           



                                                  minutes           

 

     labetalol            No                       10 mg, 2           [facility]

rajeev



               -08                               mL, Route:           l



               23:43:                               IVP, Drug                                            form: INJ,           



                                                  Q15Min,           



                                                  Dosing           



                                                  Weight           



                                                  127.727,           



                                                  kg, Start           



                                                  date:           



                                                  22           



                                                  18:43:00           



                                                  CDT,           



                                                  Duration:           



                                                  3 doses or           



                                                  times,           



                                                  Stop date:           



                                                  22           



                                                  19:13:00           



                                                  CDT, 0           

 

     Saline            No                       Notes:           [facility]



     Flush 0.9%      -08                               (Same as:           l



               23:43:                               BD             Jose                                 Posiflush)           

 

     Sodium            No                       1,000 mL,           [facility]

a



     Chloride      08                               Rate: 75           l



     0.9% IV      23:43:                               ml/hr,           Roe



     1,000 mL      00                                 Infuse           



                                                  over: 13.3           



                                                  hr, Route:           



                                                  IV, Dosing           



                                                  Weight           



                                                  127.727           



                                                  kg, Total           



                                                  Volume:           



                                                  1,000,           



                                                  Start           



                                                  date:           



                                                  22           



                                                  18:43:00           



                                                  CDT,           



                                                  Duration:           



                                                  30 day,           



                                                  Stop date:           



                                                  22           



                                                  18:42:00           



                                                  CDT, BSA:           



                                                  2.37 m2, 0           

 

     acetaminoph            No                       Notes: Do           M

[facility]



     en        4-08                               not exceed           l



               23:43:                               4 gm/day.           Roe



               00                                 (Same as:           



                                                  Tylenol)           

 

     acetaminoph            No                       Notes:           [facility]

rajeev



     en-hydrocod      4-08                               (Same as:           l



     one 325      23:43:                               Norco           Jose



     mg-5 mg      00                                 325/5) Do           



     oral tablet                                         not exceed           



                                                  4gm/day of           



                                                  acetaminop           



                                                  hen.           

 

     acetaminoph            No                       Notes: Do           M

[facility]



     en-hydrocod      4-08                               not exceed           l



     one 325      23:43:                               4gm/day of           [facility]

[facility]



     mg-10 mg      00                                 acetaminop           



     oral tablet                                         hen. (Same           



                                                  as: Norco           



                                                  325/10)           

 

     bisacodyl            No                       Notes:           [facility]

a



               4-08                               (Same As:           l



               23:43:                               Dulcolax,           Jose



                                                Bisco-Lax)           

 

     hydrALAZINE            No                       Notes:           [facility]

rajeev



               4-08                               (Same as:           l



               23:43:                               Apresoline           Jose                                 ) Push           



                                                  over 5           



                                                  minutes           

 

     labetalol            No                       10 mg, 2           [facility]

rajeev



               4-08                               mL, Route:           l



               23:43:                               IVP, Drug                                            form: INJ,           



                                                  Q15Min,           



                                                  Dosing           



                                                  Weight           



                                                  127.727,           



                                                  kg, Start           



                                                  date:           



                                                  22           



                                                  18:43:00           



                                                  CDT,           



                                                  Duration:           



                                                  3 doses or           



                                                  times,           



                                                  Stop date:           



                                                  22           



                                                  19:13:00           



                                                  CDT, 0           

 

     Saline            No                       Notes:           [facility]



     Flush 0.9%      -08                               (Same as:           l



               23:43:                               BD             Jose                                 Posiflush)           

 

     Sodium            No                       1,000 mL,           [facility]

a



     Chloride      -08                               Rate: 75           l



     0.9% IV      23:43:                               ml/hr,           [facility]oe



     1,000 mL      00                                 Infuse           



                                                  over: 13.3           



                                                  hr, Route:           



                                                  IV, Dosing           



                                                  Weight           



                                                  127.727           



                                                  kg, Total           



                                                  Volume:           



                                                  1,000,           



                                                  Start           



                                                  date:           



                                                  22           



                                                  18:43:00           



                                                  CDT,           



                                                  Duration:           



                                                  30 day,           



                                                  Stop date:           



                                                  22           



                                                  18:42:00           



                                                  CDT, BSA:           



                                                  2.37 m2, 0           

 

     acetaminoph            No                       Notes: Do           M

[facility]



     en        4-08                               not exceed           l



               23:43:                               4 gm/day.           Roe



               00                                 (Same as:           



                                                  Tylenol)           

 

     acetaminoph            No                       Notes:           [facility]

rajeev



     en-hydrocod      4-08                               (Same as:           l



     one 325      23:43:                               Norco           Jose



     mg-5 mg      00                                 325/5) Do           



     oral tablet                                         not exceed           



                                                  4gm/day of           



                                                  acetaminop           



                                                  hen.           

 

     acetaminoph            No                       Notes: Do           M

[facility]



     en-hydrocod      4-08                               not exceed           l



     one 325      23:43:                               4gm/day of           [facility]

[facility]



     mg-10 mg      00                                 acetaminop           



     oral tablet                                         hen. (Same           



                                                  as: Norco           



                                                  325/10)           

 

     bisacodyl            No                       Notes:           [facility]

a



               4-08                               (Same As:           l



               23:43:                               Dulcolax,           Jose



                                                Bisco-Lax)           

 

     hydrALAZINE            No                       Notes:           [facility]

rajeev



               4-08                               (Same as:           l



               23:43:                               Apresoline                                            ) Push           



                                                  over 5           



                                                  minutes           

 

     labetalol            No                       10 mg, 2           [facility]

rajeev



               4-08                               mL, Route:           l



               23:43:                               IVP, Drug                                            form: INJ,           



                                                  Q15Min,           



                                                  Dosing           



                                                  Weight           



                                                  127.727,           



                                                  kg, Start           



                                                  date:           



                                                  22           



                                                  18:43:00           



                                                  CDT,           



                                                  Duration:           



                                                  3 doses or           



                                                  times,           



                                                  Stop date:           



                                                  22           



                                                  19:13:00           



                                                  CDT, 0           

 

     Saline            No                       Notes:           [facility]



     Flush 0.9%                                     (Same as:           l



               23:43:                               BD                                              Posiflush)           

 

     Sodium            No                       1,000 mL,           [facility]

a



     Chloride                                     Rate: 75           l



     0.9% IV      23:43:                               ml/hr,           Jose



     1,000 mL      00                                 Infuse           



                                                  over: 13.3           



                                                  hr, Route:           



                                                  IV, Dosing           



                                                  Weight           



                                                  127.727           



                                                  kg, Total           



                                                  Volume:           



                                                  1,000,           



                                                  Start           



                                                  date:           



                                                  22           



                                                  18:43:00           



                                                  CDT,           



                                                  Duration:           



                                                  30 day,           



                                                  Stop date:           



                                                  22           



                                                  18:42:00           



                                                  CDT, BSA:           



                                                  2.37 m2, 0           

 

     acetaminoph            No                       Notes: Do           M

[facility]



     en        4-08                               not exceed           l



               23:43:                               4 gm/day.           Roe



               00                                 (Same as:           



                                                  Tylenol)           

 

     acetaminoph            No                       Notes:           [facility]

rajeev



     en-hydrocod      4-08                               (Same as:           l



     one 325      23:43:                               Norco           Joes



     mg-5 mg      00                                 325/5) Do           



     oral tablet                                         not exceed           



                                                  4gm/day of           



                                                  acetaminop           



                                                  hen.           

 

     acetaminoph            No                       Notes: Do           M

[facility]



     en-hydrocod                                     not exceed           l



     one 325      23:43:                               4gm/day of           Herm

nguyen



     mg-10 mg      00                                 acetaminop           



     oral tablet                                         hen. (Same           



                                                  as: Norco           



                                                  325/10)           

 

     bisacodyl            No                       Notes:           [facility]

a



               08                               (Same As:           l



               23:43:                               Dulcolax,                                            Bisco-Lax)           

 

     hydrALAZINE            No                       Notes:           [facility]

rajeev



               08                               (Same as:           l



               23:43:                               Apresoline                                            ) Push           



                                                  over 5           



                                                  minutes           

 

     labetalol            No                       10 mg, 2           [facility]

rajeev



               -08                               mL, Route:           l



               23:43:                               IVP, Drug                                            form: INJ,           



                                                  Q15Min,           



                                                  Dosing           



                                                  Weight           



                                                  127.727,           



                                                  kg, Start           



                                                  date:           



                                                  22           



                                                  18:43:00           



                                                  CDT,           



                                                  Duration:           



                                                  3 doses or           



                                                  times,           



                                                  Stop date:           



                                                  22           



                                                  19:13:00           



                                                  CDT, 0           

 

     Saline            No                       Notes:           [facility]



     Flush 0.9%                                     (Same as:           l



               23:43:                               BD                                              Posiflush)           

 

     NaCl 0.9%       No             1000mL      at 999           Uni

vers



     (NS) bolus                                mL/hr,           ity of



     infusion      05:00: 05:59                          1,000 mL,           Eric

as



     1,000 mL      00   :00                           IV             Medical



                                                  Piggyback,           Branch



                                                  ONCE, 1           



                                                  dose, On           



                                                  22           



                                                  at 0000,           



                                                  STAT           

 

     diphenhydrA       No             25mg      25 mg,           Uni

vers



     MINE                                Slow IV           ity of



     (BENADRYL)      05:00: 04:47                          Push,           [place]



     injection      00   :00                           ONCE, 1           Medical



     25 mg                                         dose, On           Branch



                                                  22           



                                                  at 0000,           



                                                  STAT           

 

     haloperidol      0  No             2.5mg      2.5 mg,           U

nivers



     lactate                                Intravenou           ity o

f



     (HALDOL)      05:00: 04:47                          s, ONCE, 1           Te

xas



     injection      00   :00                           dose, On           [facility]

l



     2.5 mg                                         22           Branch



                                                  at 0000,           



                                                  STAT           

 

     topiramate      2022-0      Yes                      25 mg = 1           Me

moria



     25 mg oral      3-29                               cap, PO,           l



     capsule      13:44:                               Q12H, # 60           Herm

nguyen



               00                                 cap, 0           



                                                  Refill(s),           



                                                  Pharmacy:           



                                                  [facility]           



                                                  [id number],           



                                                  149.86,           



                                                  cm,            



                                                  22           



                                                  1:18:00           



                                                  CDT,           



                                                  Height,           



                                                  127.727,           



                                                  kg,            



                                                  22           



                                                  1:18:00           



                                                  CDT,           



                                                  Weight           

 

     topiramate      2022-0      Yes                      25 mg = 1           Me

moria



     25 mg oral      3-29                               cap, PO,           l



     capsule      13:44:                               Q12H, # 60           Herm

nguyen



               00                                 cap, 0           



                                                  Refill(s),           



                                                  Pharmacy:           



                                                  [facility]           



                                                  [facility] #[id number],           



                                                  149.86,           



                                                  cm,            



                                                  22           



                                                  1:18:00           



                                                  CDT,           



                                                  Height,           



                                                  127.727,           



                                                  kg,            



                                                  22           



                                                  1:18:00           



                                                  CDT,           



                                                  Weight           

 

     topiramate      2-0      Yes                      25 mg = 1           Me

moria



     25 mg oral      3-29                               cap, PO,           l



     capsule      13:44:                               Q12H, # 60           Herm

nguyen



               00                                 cap, 0           



                                                  Refill(s),           



                                                  Pharmacy:           



                                                  [facility]           



                                                  [facility] #[id number],           



                                                  149.86,           



                                                  cm,            



                                                  22           



                                                  1:18:00           



                                                  CDT,           



                                                  Height,           



                                                  127.727,           



                                                  kg,            



                                                  22           



                                                  1:18:00           



                                                  CDT,           



                                                  Weight           

 

     topiramate      2-0      Yes                      25 mg = 1           Me

moria



     25 mg oral      3-29                               cap, PO,           l



     capsule      13:44:                               Q12H, # 60           Herm

nguyen



               00                                 cap, 0           



                                                  Refill(s),           



                                                  Pharmacy:           



                                                  [facility]           



                                                  [facility] #[id number],           



                                                  149.86,           



                                                  cm,            



                                                  22           



                                                  1:18:00           



                                                  CDT,           



                                                  Height,           



                                                  127.727,           



                                                  kg,            



                                                  22           



                                                  1:18:00           



                                                  CDT,           



                                                  Weight           

 

     topiramate      2022-0      Yes                      25 mg = 1           Me

moria



     25 mg oral      3-29                               cap, PO,           l



     capsule      13:44:                               Q12H, # 60           Herm

nguyen



               00                                 cap, 0           



                                                  Refill(s),           



                                                  Pharmacy:           



                                                  [facility]           



                                                  [facility] #[id number],           



                                                  149.86,           



                                                  cm,            



                                                  22           



                                                  1:18:00           



                                                  CDT,           



                                                  Height,           



                                                  127.727,           



                                                  kg,            



                                                  22           



                                                  1:18:00           



                                                  CDT,           



                                                  Weight           

 

     topiramate      2022-0      Yes                      25 mg = 1           Me

moria



     25 mg oral      3-29                               cap, PO,           l



     capsule      13:44:                               Q12H, # 60           Herm

nguyen



               00                                 cap, 0           



                                                  Refill(s),           



                                                  Pharmacy:           



                                                  [facility] #[id number],           



                                                  149.86,           



                                                  cm,            



                                                  22           



                                                  1:18:00           



                                                  CDT,           



                                                  Height,           



                                                  127.727,           



                                                  kg,            



                                                  22           



                                                  1:18:00           



                                                  CDT,           



                                                  Weight           

 

     topiramate            Yes                      25 mg = 1           Me

moria



     25 mg oral      3-29                               cap, PO,           l



     capsule      13:44:                               Q12H, # 60           Herm

nguyen



               00                                 cap, 0           



                                                  Refill(s),           



                                                  Pharmacy:           



                                                  [facility] #[id number],           



                                                  149.86,           



                                                  cm,            



                                                  22           



                                                  1:18:00           



                                                  CDT,           



                                                  Height,           



                                                  127.727,           



                                                  kg,            



                                                  22           



                                                  1:18:00           



                                                  CDT,           



                                                  Weight           

 

     topiramate            No                       Notes:           [facility]

ia



               3-28                               Hazardous           l



               22:05:                               Drug Group           Roe                                 3:Reproduc           



                                                  tive risk           



                                                  Hazardous           



                                                  Drug --           



                                                  Refer to           



                                                  safe           



                                                  handling           



                                                  procedure           



                                                  PPE Matrix           



                                                  Sprinkle           



                                                  formulatio           



                                                  n. (Same           



                                                  As:            



                                                  Topamax)           

 

     topiramate            No                       Notes:           [facility]

ia



               3-28                               Hazardous           l



               22:05:                               Drug Group           Jose



                                                3:Reproduc           



                                                  tive risk           



                                                  Hazardous           



                                                  Drug --           



                                                  Refer to           



                                                  safe           



                                                  handling           



                                                  procedure           



                                                  PPE Matrix           



                                                  Sprinkle           



                                                  formulatio           



                                                  n. (Same           



                                                  As:            



                                                  Topamax)           

 

     topiramate            No                       Notes:           Memor

ia



               3-28                               Hazardous           l



               22:05:                               Drug Group           Roe



                                                3:Reproduc           



                                                  tive risk           



                                                  Hazardous           



                                                  Drug --           



                                                  Refer to           



                                                  safe           



                                                  handling           



                                                  procedure           



                                                  PPE Matrix           



                                                  Sprinkle           



                                                  formulatio           



                                                  n. (Same           



                                                  As:            



                                                  Topamax)           

 

     topiramate            No                       Notes:           Memor

ia



               3-28                               Hazardous           l



               22:05:                               Drug Group           Roe



                                                3:Reproduc           



                                                  tive risk           



                                                  Hazardous           



                                                  Drug --           



                                                  Refer to           



                                                  safe           



                                                  handling           



                                                  procedure           



                                                  PPE Matrix           



                                                  Sprinkle           



                                                  formulatio           



                                                  n. (Same           



                                                  As:            



                                                  Topamax)           

 

     topiramate            No                       Notes:           Memor

ia



               3-28                               Hazardous           l



               22:05:                               [facility]           [facility]



                                                3:Reproduc           



                                                  tive risk           



                                                  Hazardous           



                                                  Drug --           



                                                  Refer to           



                                                  safe           



                                                  handling           



                                                  procedure           



                                                  PPE Matrix           



                                                   Sprinkle           



                                                  formulatio           



                                                  n. (Same           



                                                  As:            



                                                  Topamax)           

 

     topiramate            No                       Notes:           [facility]

ia



               3-28                               Hazardous           l



               22:05:                               Drug Group           [facility]



               00                                 3:Reproduc           



                                                  tive risk           



                                                  Hazardous           



                                                  Drug --           



                                                  Refer to           



                                                  safe           



                                                  handling           



                                                  procedure           



                                                  PPE Matrix           



                                                  Sprinkle           



                                                  formulatio           



                                                  n. (Same           



                                                  As:            



                                                  Topamax)           

 

     topiramate            No                       Notes:           [facility]

ia



               3-28                               Hazardous           l



               22:05:                               Drug Group           [facility]



               00                                 3:Reproduc           



                                                  tive risk           



                                                  Hazardous           



                                                  Drug --           



                                                  Refer to           



                                                  safe           



                                                  handling           



                                                  procedure           



                                                  PPE Matrix           



                                                  Sprinkle           



                                                  formulatio           



                                                  n. (Same           



                                                  As:            



                                                  Topamax)           

 

     Yordan            No                       Notes:           [facility]



     packet      3-28                               (Same as:           l



               21:30:                               Yordan           [facility]



               00                                 Unflavored           



                                                  )              



                                                  Administer           



                                                  ing YORDAN           



                                                  orally:           



                                                  Mix into           



                                                  8-10 fl oz           



                                                  of room           



                                                  temperatur           



                                                  e juice,           



                                                  soda, or           



                                                  water.           



                                                  Also mixes           



                                                  well with           



                                                  warm           



                                                  beverages,           



                                                  like           



                                                  coffee or           



                                                  tea. Can           



                                                  be mixed           



                                                  with           



                                                  applesauce           



                                                  .              



                                                  Thoroughly           



                                                  stir for           



                                                  30-60           



                                                  seconds or           



                                                  until           



                                                  completely           



                                                  dissolved           

 

     Yordan            No                       Notes:           [facility]



     packet      3-28                               (Same as:           l



               21:30:                               Yordan           [place]



               00                                 Unflavored           



                                                  )              



                                                  Administer           



                                                  ing YORDAN           



                                                  orally:           



                                                  Mix into           



                                                  8-10 fl oz           



                                                  of room           



                                                  temperatur           



                                                  e juice,           



                                                  soda, or           



                                                  water.           



                                                  Also mixes           



                                                  well with           



                                                  warm           



                                                  beverages,           



                                                  like           



                                                  coffee or           



                                                  tea. Can           



                                                  be mixed           



                                                  with           



                                                  applesauce           



                                                  .              



                                                  Thoroughly           



                                                  stir for           



                                                  30-60           



                                                  seconds or           



                                                  until           



                                                  completely           



                                                  dissolved           

 

     Yordan            No                       Notes:           [facility]



     packet      3-28                               (Same as:           l



               21:30:                               Yordan           [facility]



               00                                 Unflavored           



                                                  )              



                                                  Administer           



                                                  ing YORDAN           



                                                  orally:           



                                                  Mix into           



                                                  8-10 fl oz           



                                                  of room           



                                                  temperatur           



                                                  e juice,           



                                                  soda, or           



                                                  water.           



                                                  Also mixes           



                                                  well with           



                                                  warm           



                                                  beverages,           



                                                  like           



                                                  coffee or           



                                                  tea. Can           



                                                  be mixed           



                                                  with           



                                                  applesauce           



                                                  .              



                                                  Thoroughly           



                                                  stir for           



                                                  30-60           



                                                  seconds or           



                                                  until           



                                                  completely           



                                                  dissolved           

 

     Yordan            No                       Notes:           [facility]



     packet      3-28                               (Same as:           l



               21:30:                               Yordan           [place]



               00                                 Unflavored           



                                                  )              



                                                  Administer           



                                                  ing YORDAN           



                                                  orally:           



                                                  Mix into           



                                                  8-10 fl oz           



                                                  of room           



                                                  temperatur           



                                                  e juice,           



                                                  soda, or           



                                                  water.           



                                                  Also mixes           



                                                  well with           



                                                  warm           



                                                  beverages,           



                                                  like           



                                                  coffee or           



                                                  tea. Can           



                                                  be mixed           



                                                  with           



                                                  applesauce           



                                                  .              



                                                  Thoroughly           



                                                  stir for           



                                                  30-60           



                                                  seconds or           



                                                  until           



                                                  completely           



                                                  dissolved           

 

     Yordan            No                       Notes:           [facility]



     packet      3-28                               (Same as:           l



               21:30:                               Yordan           [facility]



               00                                 Unflavored           



                                                  )              



                                                  Administer           



                                                  ing YORDAN           



                                                  orally:           



                                                  Mix into           



                                                  8-10 fl oz           



                                                  of room           



                                                  temperatur           



                                                  e juice,           



                                                  soda, or           



                                                  water.           



                                                  Also mixes           



                                                  well with           



                                                  warm           



                                                  beverages,           



                                                  like           



                                                  coffee or           



                                                  tea. Can           



                                                  be mixed           



                                                  with           



                                                  applesauce           



                                                  .              



                                                  Thoroughly           



                                                  stir for           



                                                  30-60           



                                                  seconds or           



                                                  until           



                                                  completely           



                                                  dissolved           

 

     Yordan            No                       Notes:           [facility]



     packet      3-28                               (Same as:           l



               21:30:                               Yordan           [facility]



               00                                 Unflavored           



                                                  )              



                                                  Administer           



                                                  ing YORDAN           



                                                  orally:           



                                                  Mix into           



                                                  8-10 fl oz           



                                                  of room           



                                                  temperatur           



                                                  e juice,           



                                                  soda, or           



                                                  water.           



                                                  Also mixes           



                                                  well with           



                                                  warm           



                                                  beverages,           



                                                  like           



                                                  coffee or           



                                                  tea. Can           



                                                  be mixed           



                                                  with           



                                                  applesauce           



                                                  .              



                                                  Thoroughly           



                                                  stir for           



                                                  30-60           



                                                  seconds or           



                                                  until           



                                                  completely           



                                                  dissolved           

 

     Yordan      0      No                       Notes:           [facility]



     packet      3-                               (Same as:           l



               21:30:                               Yordan           [place]



               00                                 Unflavored           



                                                  )              



                                                  Administer           



                                                  ing YORDAN           



                                                  orally:           



                                                  Mix into           



                                                  8-10 fl oz           



                                                  of room           



                                                  temperatur           



                                                  e juice,           



                                                  soda, or           



                                                  water.           



                                                  Also mixes           



                                                  well with           



                                                  warm           



                                                  beverages,           



                                                  like           



                                                  coffee or           



                                                  tea. Can           



                                                  be mixed           



                                                  with           



                                                  applesauce           



                                                  .              



                                                  Thoroughly           



                                                  stir for           



                                                  30-60           



                                                  seconds or           



                                                  until           



                                                  completely           



                                                  dissolved           

 

     Lovenox      -0      No                       Notes:           [facility]



               3-27                               (Same as:           l



               16:00:                               Lovenox)           [facility]



                                                               

 

     Lovenox      0      No                       Notes:           [facility]



               3-27                               (Same as:           l



               16:00:                               Lovenox)           Jose



                                                               

 

     Lovenox      -0      No                       Notes:           [facility]



               3-27                               (Same as:           l



               16:00:                               Lovenox)           [facility]



                                                               

 

     Lovenox      -0      No                       Notes:           [facility]



               3-27                               (Same as:           l



               16:00:                               Lovenox)           Jose



               00                                                

 

     Lovenox      -0      No                       Notes:           [facility]



               3-27                               (Same as:           l



               16:00:                               Lovenox)           [facility]



                                                               

 

     Lovenox      -0      No                       Notes:           [facility]



               3-27                               (Same as:           l



               16:00:                               Lovenox)           [facility]



               00                                                

 

     Lovenox      -0      No                       Notes:           [facility]



               3-27                               (Same as:           l



               16:00:                               Lovenox)           [facility]



               00                                                

 

     Magnesium      -0      No                       Notes:           [facility]

a



     Sulfate      3-27                               WASTE: F/P           l



               15:54:                               - Sink; E                                            -              



                                                  [facility]           



                                                  Trash Bin           

 

     methylPREDN            No                       Notes:           [facility]

rajeev



     ISolone      3-27                               (Same           l



     SODium      15:54:                               as:Solu-ME           [facility]il[facility]

nn



     SUCCinate      00                                 DROL,           



                                                  A-Methapre           



                                                  d) ***           



                                                  MEDICATION           



                                                  WASTE ***           



                                                  Product           



                                                  Size: 1000           



                                                  mg Product           



                                                  Wasted:           



                                                  ___ mg           

 

     Magnesium            No                       Notes:           [facility]

a



     Sulfate      3-27                               WASTE: F/P           l



               15:54:                               - Sink;                                  -              



                                                  [facility]           



                                                  Trash Bin           

 

     methylPREDN            No                       Notes:           [facility]

rajeev



     ISolone      3-27                               (Same           l



     SODium      15:54:                               as:Solu-ME           [facility]

nn



     SUCCinate      00                                 DROL,           



                                                  A-Methapre           



                                                  d) ***           



                                                  MEDICATION           



                                                  WASTE ***           



                                                  Product           



                                                  Size: 1000           



                                                  mg Product           



                                                  Wasted:           



                                                  ___ mg           

 

     Magnesium            No                       Notes:           [facility]

a



     Sulfate      3-27                               WASTE: F/P           l



               15:54:                               - Sink;                                  -              



                                                  [facility]           



                                                  Trash Bin           

 

     methylPREDN            No                       Notes:           [facility]

rajeev



     ISolone      3-27                               (Same           l



     SODium      15:54:                               as:Solu-ME           [facility]

nn



     SUCCinate      00                                 DROL,           



                                                  A-Methapre           



                                                  d) ***           



                                                  MEDICATION           



                                                  WASTE ***           



                                                  Product           



                                                  Size: 1000           



                                                  mg Product           



                                                  Wasted:           



                                                  ___ mg           

 

     Magnesium            No                       Notes:           [facility]

a



     Sulfate      3-27                               WASTE: F/P           l



               15:54:                               - Sink;               



                                                  [facility]           



                                                  Trash Bin           

 

     methylPREDN            No                       Notes:           [facility]

rajeev



     ISolone      3-27                               (Same           l



     SODium      15:54:                               as:Solu-ME           [facility]

nn



     SUCCinate      00                                 DROL,           



                                                  A-Methapre           



                                                  d) ***           



                                                  MEDICATION           



                                                  WASTE ***           



                                                  Product           



                                                  Size: 1000           



                                                  mg Product           



                                                  Wasted:           



                                                  ___ mg           

 

     Magnesium            No                       Notes:           [facility]

a



     Sulfate      3-27                               WASTE: F/P           l



               15:54:                               - Sink;                                  -              



                                                  [facility]           



                                                  Trash Bin           

 

     methylPREDN            No                       Notes:           [facility]

rajeev



     ISolone      3-27                               (Same           l



     SODium      15:54:                               as:Solu-ME           [facility]

nn



     SUCCinate      00                                 DROL,           



                                                  A-Methapre           



                                                  d) ***           



                                                  MEDICATION           



                                                  WASTE ***           



                                                  Product           



                                                  Size: 1000           



                                                  mg Product           



                                                  Wasted:           



                                                  ___ mg           

 

     Magnesium            No                       Notes:           [facility]

a



     Sulfate      3-27                               WASTE: F/P           l



               15:54:                               - Sink; E           [facility]



                                                -              



                                                  [facility]           



                                                  Trash Bin           

 

     methylPREDN            No                       Notes:           [facility]

rajeev



     ISolone      3-27                               (Same           l



     SODium      15:54:                               as:Solu-ME           [facility]

nn



     SUCCinate      00                                 DROL,           



                                                  A-Methapre           



                                                  d) ***           



                                                  MEDICATION           



                                                  WASTE ***           



                                                  Product           



                                                  Size: 1000           



                                                  mg Product           



                                                  Wasted:           



                                                  ___ mg           

 

     Magnesium            No                       Notes:           [facility]

a



     Sulfate      3-27                               WASTE: F/P           l



               15:54:                               - Sink; E           [facility]



                                                -              



                                                  [facility]           



                                                  Trash Bin           

 

     methylPREDN            No                       Notes:           [facility]

rajeev



     ISolone      3-27                               (Same           l



     SODium      15:54:                               as:Solu-ME           [facility]

nn



     SUCCinate      00                                 DROL,           



                                                  A-Methapre           



                                                  d) ***           



                                                  MEDICATION           



                                                  WASTE ***           



                                                  Product           



                                                  Size: 1000           



                                                  mg Product           



                                                  Wasted:           



                                                  ___ mg           

 

     Dilaudid            No                       Notes:           [facility]



               3-27                               (Same as:           l



               13:28:                               Dilaudid)           [facility]



                                                               

 

     Dilaudid            No                       Notes:           [facility]



               3-27                               (Same as:           l



               13:28:                               Dilaudid)           [facility]



                                                               

 

     Dilaudid            No                       Notes:           [facility]



               3-27                               (Same as:           l



               13:28:                               Dilaudid)           [facility]



                                                               

 

     Dilaudid            No                       Notes:           [facility]



               3-27                               (Same as:           l



               13:28:                               Dilaudid)           [facility]



                                                               

 

     Dilaudid            No                       Notes:           [facility]



               3-27                               (Same as:           l



               13:28:                               Dilaudid)           [facility]



                                                               

 

     Dilaudid            No                       Notes:           [facility]



               3-27                               (Same as:           l



               13:28:                               Dilaudid)           [facility]



                                                               

 

     Dilaudid            No                       Notes:           [facility]



               3-27                               (Same as:           l



               13:28:                               Dilaudid)           [facility]



                                                               

 

     remove            No                       Notes:           [facility]



     patch      3-27                               Remove           l



               02:00:                               patch 12           [facility]



               00                                 hours           



                                                  after           



                                                  applicatio           



                                                  n each           



                                                  day.           

 

     remove            No                       Notes:           [facility]



     patch      3-27                               Remove           l



               02:00:                               patch 12           [facility]



               00                                 hours           



                                                  after           



                                                  applicatio           



                                                  n each           



                                                  day.           

 

     remove            No                       Notes:           [facility]



     patch      3-27                               Remove           l



               02:00:                               patch 12           [facility]



               00                                 hours           



                                                  after           



                                                  applicatio           



                                                  n each           



                                                  day.           

 

     remove            No                       Notes:           [facility]



     patch      3-27                               Remove           l



               02:00:                               patch 12           [facility]



               00                                 hours           



                                                  after           



                                                  applicatio           



                                                  n each           



                                                  day.           

 

     remove            No                       Notes:           [facility]



     patch      3-27                               Remove           l



               02:00:                               patch 12           [facility]



               00                                 hours           



                                                  after           



                                                  applicatio           



                                                  n each           



                                                  day.           

 

     remove            No                       Notes:           [facility]



     patch      3-27                               Remove           l



               02:00:                               patch 12           [facility]



               00                                 hours           



                                                  after           



                                                  applicatio           



                                                  n each           



                                                  day.           

 

     remove            No                       Notes:           [facility]



     patch      3-27                               Remove           l



               02:00:                               patch 12           [facility]



               00                                 hours           



                                                  after           



                                                  applicatio           



                                                  n each           



                                                  day.           

 

     Lyrica            No                       Notes:           [facility]



               3-26                               (Same as:           l



               21:58:                               Lyrica)           [facility]



               00                                                

 

     Naproxen            No                       Notes:           [facility]



               3-26                               (Same as:           l



               21:58:                               Naprosyn)           [facility]



               00                                 Take with           



                                                  food.           

 

     Lyrica            No                       Notes:           [facility]



               3-26                               (Same as:           l



               21:58:                               Lyrica)           [facility]



               00                                                

 

     Naproxen            No                       Notes:           [facility]



               3-26                               (Same as:           l



               21:58:                               Naprosyn)           [facility]



               00                                 Take with           



                                                  food.           

 

     Lyrica            No                       Notes:           [facility]



               3-26                               (Same as:           l



               21:58:                               Lyrica)           [place]



               00                                                

 

     Naproxen            No                       Notes:           [facility]



               3-26                               (Same as:           l



               21:58:                               Naprosyn)           [facility]



               00                                 Take with           



                                                  food.           

 

     Lyrica            No                       Notes:           [facility]



               3-26                               (Same as:           l



               21:58:                               Lyrica)           [place]



               00                                                

 

     Naproxen            No                       Notes:           [facility]



               3-26                               (Same as:           l



               21:58:                               Naprosyn)           [facility]



               00                                 Take with           



                                                  food.           

 

     Lyrica            No                       Notes:           [facility]



               3-26                               (Same as:           l



               21:58:                               Lyrica)           [facility]



               00                                                

 

     Naproxen            No                       Notes:           [facility]



               3-26                               (Same as:           l



               21:58:                               Naprosyn)           [place]



               00                                 Take with           



                                                  food.           

 

     Lyrica            No                       Notes:           [facility]



               3-26                               (Same as:           l



               21:58:                               Lyrica)                                                           

 

     Naproxen            No                       Notes:           [facility]



               3-26                               (Same as:           l



               21:58:                               Naprosyn)           [facility]                                 Take with           



                                                  food.           

 

     Lyrica            No                       Notes:           [facility]



               3-26                               (Same as:           l



               21:58:                               Lyrica)                                                           

 

     Naproxen            No                       Notes:           [facility]



               3-26                               (Same as:           l



               21:58:                               Naprosyn)           [facility]                                 Take with           



                                                  food.           

 

     Ascorbic            No                       Notes:           [facility]



     Acid      3-26                               (Same as:           l



               14:00:                               Vitamin C)                                                           

 

     Zinc            No                       Notes:           [facility]



     Sulfate      3-26                               (Zinc           l



               14:00:                               sulfate           [facility]



                                                capsule) -           



                                                  220 mg           



                                                  Zinc           



                                                  sulfate =           



                                                  50 mg           



                                                  elemental           



                                                  zinc Same           



                                                  as Zinc           



                                                  Sulfate           

 

     multivitami            No                       Notes:           [facility]

rajeev



     n         3-26                               (Same           l



               14:00:                               as:Thera)                                            WASTE: F/P           



                                                  - Black; E           



                                                  -              



                                                  [facility]           



                                                  Trash Bin           



                                                  Take with           



                                                  food.           

 

     Lidocaine            No                       Notes:           [facility]

a



     0.05 MG/MG      3-26                               Apply only           l



     Transdermal      14:00:                               once for           He

rmann



     Patch      00                                 up to 12           



                                                  hours in a           



                                                  24-hour           



                                                  period (12           



                                                  hours on           



                                                  and 12           



                                                  hours           



                                                  off).           



                                                  (Same as:           



                                                  Aspercreme           



                                                  Lidocaine           



                                                  Patch)           



                                                  "Remove           



                                                  old patch           



                                                  before           



                                                  applicatio           



                                                  n of new           



                                                  patch"           

 

     Ascorbic            No                       Notes:           [facility]



     Acid      3-26                               (Same as:           l



               14:00:                               Vitamin C)                                                           

 

     Zinc            No                       Notes:           [facility]



     Sulfate      3-26                               (Zinc           l



               14:00:                               sulfate           [facility]



                                                capsule) -           



                                                  220 mg           



                                                  Zinc           



                                                  sulfate =           



                                                  50 mg           



                                                  elemental           



                                                  zinc Same           



                                                  as Zinc           



                                                  Sulfate           

 

     multivitami            No                       Notes:           [facility]

rajeev



     n         3-26                               (Same           l



               14:00:                               as:Thera)                                            WASTE: F/P           



                                                  - Black; E           



                                                  -              



                                                  [facility]unicipal           



                                                  Trash Bin           



                                                  Take with           



                                                  food.           

 

     Lidocaine            No                       Notes:           [facility]

a



     0.05 MG/MG      3-26                               Apply only           l



     Transdermal      14:00:                               once for           He

rmann



     Patch      00                                 up to 12           



                                                  hours in a           



                                                  24-hour           



                                                  period (12           



                                                  hours on           



                                                  and 12           



                                                  hours           



                                                  off).           



                                                  (Same as:           



                                                  Aspercreme           



                                                  Lidocaine           



                                                  Patch)           



                                                  "Remove           



                                                  old patch           



                                                  before           



                                                  applicatio           



                                                  n of new           



                                                  patch"           

 

     Ascorbic            No                       Notes:           [facility]



     Acid      3-26                               (Same as:           l



               14:00:                               Vitamin C)                                                           

 

     Zinc            No                       Notes:           [facility]



     Sulfate      3-26                               (Zinc           l



               14:00:                               sulfate           [facility]



               00                                 capsule) -           



                                                  220 mg           



                                                  Zinc           



                                                  sulfate =           



                                                  50 mg           



                                                  elemental           



                                                  zinc Same           



                                                  as Zinc           



                                                  Sulfate           

 

     multivitami            No                       Notes:           [facility]

rajeev



     n         3-26                               (Same           l



               14:00:                               as:Thera)                                            WASTE: F/P           



                                                  - Black; E           



                                                  -              



                                                  Municipal           



                                                  Trash Bin           



                                                  Take with           



                                                  food.           

 

     Lidocaine            No                       Notes:           [facility]

a



     0.05 MG/MG      3-26                               Apply only           l



     Transdermal      14:00:                               once for           He

rmann



     Patch      00                                 up to 12           



                                                  hours in a           



                                                  24-hour           



                                                  period (12           



                                                  hours on           



                                                  and 12           



                                                  hours           



                                                  off).           



                                                  (Same as:           



                                                  Aspercreme           



                                                  Lidocaine           



                                                  Patch)           



                                                  "Remove           



                                                  old patch           



                                                  before           



                                                  applicatio           



                                                  n of new           



                                                  patch"           

 

     Ascorbic            No                       Notes:           [facility]



     Acid      3-26                               (Same as:           l



               14:00:                               Vitamin C)                                                           

 

     Zinc            No                       Notes:           [facility]



     Sulfate      3-26                               (Zinc           l



               14:00:                               sulfate           [facility]



               00                                 capsule) -           



                                                  220 mg           



                                                  Zinc           



                                                  sulfate =           



                                                  50 mg           



                                                  elemental           



                                                  zinc Same           



                                                  as Zinc           



                                                  Sulfate           

 

     multivitami            No                       Notes:           [facility]

rajeev



     n         3-26                               (Same           l



               14:00:                               as:Thera)                                            WASTE: F/P           



                                                  - Black; E           



                                                  -              



                                                  Municipal           



                                                  Trash Bin           



                                                  Take with           



                                                  food.           

 

     Lidocaine            No                       Notes:           [facility]

a



     0.05 MG/MG      3-26                               Apply only           l



     Transdermal      14:00:                               once for           He

rmann



     Patch      00                                 up to 12           



                                                  hours in a           



                                                  24-hour           



                                                  period (12           



                                                  hours on           



                                                  and 12           



                                                  hours           



                                                  off).           



                                                  (Same as:           



                                                  Aspercreme           



                                                  Lidocaine           



                                                  Patch)           



                                                  "Remove           



                                                  old patch           



                                                  before           



                                                  applicatio           



                                                  n of new           



                                                  patch"           

 

     Ascorbic            No                       Notes:           [facility]



     Acid      3-26                               (Same as:           l



               14:00:                               Vitamin C)           [facility]



                                                               

 

     Zinc            No                       Notes:           [facility]



     Sulfate      3-26                               (Zinc           l



               14:00:                               sulfate           [facility]



               00                                 capsule) -           



                                                  220 mg           



                                                  Zinc           



                                                  sulfate =           



                                                  50 mg           



                                                  elemental           



                                                  zinc Same           



                                                  as Zinc           



                                                  Sulfate           

 

     multivitami            No                       Notes:           [facility]

rajeev



     n         3-26                               (Same           l



               14:00:                               as:Thera)           Jose



               00                                 WASTE: F/P           



                                                  - Black; E           



                                                  -              



                                                  Municipal           



                                                  Trash Bin           



                                                  Take with           



                                                  food.           

 

     Lidocaine            No                       Notes:           [facility]

a



     0.05 MG/MG      3-26                               Apply only           l



     Transdermal      14:00:                               once for           He

rmann



     Patch      00                                 up to 12           



                                                  hours in a           



                                                  24-hour           



                                                  period (12           



                                                  hours on           



                                                  and 12           



                                                  hours           



                                                  off).           



                                                  (Same as:           



                                                  Aspercreme           



                                                  Lidocaine           



                                                  Patch)           



                                                  "Remove           



                                                  old patch           



                                                  before           



                                                  applicatio           



                                                  n of new           



                                                  patch"           

 

     Ascorbic            No                       Notes:           [facility]



     Acid      3-26                               (Same as:           l



               14:00:                               Vitamin C)           Jose



                                                               

 

     Zinc            No                       Notes:           [facility]



     Sulfate      3-26                               (Zinc           l



               14:00:                               sulfate           [facility]



               00                                 capsule) -           



                                                  220 mg           



                                                  Zinc           



                                                  sulfate =           



                                                  50 mg           



                                                  elemental           



                                                  zinc Same           



                                                  as Zinc           



                                                  Sulfate           

 

     multivitami            No                       Notes:           [facility]

rajeev



     n         3-26                               (Same           l



               14:00:                               as:Thera)           Jose



                                                WASTE: F/P           



                                                  - Black; E           



                                                  -              



                                                  Municipal           



                                                  Trash Bin           



                                                  Take with           



                                                  food.           

 

     Lidocaine            No                       Notes:           [facility]

a



     0.05 MG/MG      3-26                               Apply only           l



     Transdermal      14:00:                               once for           He

rmann



     Patch      00                                 up to 12           



                                                  hours in a           



                                                  24-hour           



                                                  period (12           



                                                  hours on           



                                                  and 12           



                                                  hours           



                                                  off).           



                                                  (Same as:           



                                                  Aspercreme           



                                                  Lidocaine           



                                                  Patch)           



                                                  "Remove           



                                                  old patch           



                                                  before           



                                                  applicatio           



                                                  n of new           



                                                  patch"           

 

     Ascorbic            No                       Notes:           [facility]



     Acid      3-26                               (Same as:           l



               14:00:                               Vitamin C)           [facility]



                                                               

 

     Zinc            No                       Notes:           [facility]



     Sulfate      3-26                               (Zinc           l



               14:00:                               sulfate           Jose



               00                                 capsule) -           



                                                  220 mg           



                                                  Zinc           



                                                  sulfate =           



                                                  50 mg           



                                                  elemental           



                                                  zinc Same           



                                                  as Zinc           



                                                  Sulfate           

 

     multivitami            No                       Notes:           [facility]

rajeev



     n         3-26                               (Same           l



               14:00:                               as:Thera)           [facility]                                 WASTE: F/P           



                                                  - Black; E           



                                                  -              



                                                  Municipal           



                                                  Trash Bin           



                                                  Take with           



                                                  food.           

 

     Lidocaine            No                       Notes:           [facility]

a



     0.05 MG/MG      3-26                               Apply only           l



     Transdermal      14:00:                               once for           He

rmann



     Patch      00                                 up to 12           



                                                  hours in a           



                                                  24-hour           



                                                  period (12           



                                                  hours on           



                                                  and 12           



                                                  hours           



                                                  off).           



                                                  (Same as:           



                                                  Aspercreme           



                                                  Lidocaine           



                                                  Patch)           



                                                  "Remove           



                                                  old patch           



                                                  before           



                                                  applicatio           



                                                  n of new           



                                                  patch"           

 

     Celebrex            No                       Notes:           [facility]



               3-26                               NSAID.           l



               03:15:                               Please           Jose



               00                                 check           



                                                  indication           



                                                  . Not for           



                                                  seizure.           



                                                  (Same As:           



                                                  CeleBREX)           

 

     Robaxin      0      No                       Notes:           [facility]



               3-26                               (Same           l



               03:15:                               as:Robaxin           [facility]



               00                                 )              

 

     gabapentin      0      No                       Notes:           [facility]

ia



               3-26                               (Same as:           l



               03:15:                               Neurontin)           [facility]



               00                                                

 

     Tramadol      0      No                       Notes: Not           [facility]

[facility]



               3-26                               to exceed           l



               03:15:                               400mg/day.           [facility]



               00                                 (Same As:           



                                                  Ultram)           

 

     Celebrex      0      No                       Notes:           [facility]



               3-26                               NSAID.           l



               03:15:                               Please           [facility]



               00                                 check           



                                                  indication           



                                                  . Not for           



                                                  seizure.           



                                                  (Same As:           



                                                  CeleBREX)           

 

     Robaxin      0      No                       Notes:           [facility]



               3-26                               (Same           l



               03:15:                               as:Robaxin           Jose



               00                                 )              

 

     gabapentin      0      No                       Notes:           [facility]

ia



               3-26                               (Same as:           l



               03:15:                               Neurontin)           Jose



               00                                                

 

     Tramadol      0      No                       Notes: Not           [facility]

[facility]



               3-26                               to exceed           l



               03:15:                               400mg/day.           [facility]



               00                                 (Same As:           



                                                  Ultram)           

 

     Celebrex      0      No                       Notes:           [facility]



               3-26                               NSAID.           l



               03:15:                               Please           Jose



               00                                 check           



                                                  indication           



                                                  . Not for           



                                                  seizure.           



                                                  (Same As:           



                                                  CeleBREX)           

 

     Robaxin      0      No                       Notes:           [facility]



               3-26                               (Same           l



               03:15:                               as:Robaxin           [facility]



               00                                 )              

 

     gabapentin      0      No                       Notes:           [facility]

ia



               3-26                               (Same as:           l



               03:15:                               Neurontin)           Jose



               00                                                

 

     Tramadol      0      No                       Notes: Not           [facility]

[facility]



               3-26                               to exceed           l



               03:15:                               400mg/day.           Jose



               00                                 (Same As:           



                                                  Ultram)           

 

     Celebrex      0      No                       Notes:           [facility]



               3-26                               NSAID.           l



               03:15:                               Please           Jose



               00                                 check           



                                                  indication           



                                                  . Not for           



                                                  seizure.           



                                                  (Same As:           



                                                  CeleBREX)           

 

     Robaxin      0      No                       Notes:           [facility]



               3-26                               (Same           l



               03:15:                               as:Robaxin           [facility]



               00                                 )              

 

     gabapentin      0      No                       Notes:           [facility]

ia



               3-26                               (Same as:           l



               03:15:                               Neurontin)           [facility]



               00                                                

 

     Tramadol      0      No                       Notes: Not           [facility]

[facility]



               3-26                               to exceed           l



               03:15:                               400mg/day.           [facility]



               00                                 (Same As:           



                                                  Ultram)           

 

     Celebrex      0      No                       Notes:           [facility]



               3-26                               NSAID.           l



               03:15:                               Please           [facility]



               00                                 check           



                                                  indication           



                                                  . Not for           



                                                  seizure.           



                                                  (Same As:           



                                                  CeleBREX)           

 

     Robaxin      0      No                       Notes:           [facility]



               3-26                               (Same           l



               03:15:                               as:Robaxin           [facility]



               00                                 )              

 

     gabapentin      0      No                       Notes:           [facility]

ia



               3-26                               (Same as:           l



               03:15:                               Neurontin)           [facility]



               00                                                

 

     Tramadol      0      No                       Notes: Not           [facility]

[facility]



               3-26                               to exceed           l



               03:15:                               400mg/day.           [facility]



               00                                 (Same As:           



                                                  Ultram)           

 

     Celebrex      0      No                       Notes:           [facility]



               3-26                               NSAID.           l



               03:15:                               Please           [facility]



               00                                 check           



                                                  indication           



                                                  . Not for           



                                                  seizure.           



                                                  (Same As:           



                                                  CeleBREX)           

 

     Robaxin      0      No                       Notes:           [facility]



               3-26                               (Same           l



               03:15:                               as:Robaxin           [facility]



               00                                 )              

 

     gabapentin      0      No                       Notes:           [facility]

ia



               3-26                               (Same as:           l



               03:15:                               Neurontin)           [facility]



               00                                                

 

     Tramadol      0      No                       Notes: Not           [facility]

[facility]



               3-26                               to exceed           l



               03:15:                               400mg/day.           [facility]



               00                                 (Same As:           



                                                  Ultram)           

 

     Celebrex      0      No                       Notes:           [facility]



               3-26                               NSAID.           l



               03:15:                               Please           [facility]



               00                                 check           



                                                  indication           



                                                  . Not for           



                                                  seizure.           



                                                  (Same As:           



                                                  CeleBREX)           

 

     Robaxin      0      No                       Notes:           [facility]



               3-26                               (Same           l



               03:15:                               as:Robaxin           [facility]



               00                                 )              

 

     gabapentin      0      No                       Notes:           [facility]

ia



               3-26                               (Same as:           l



               03:15:                               Neurontin)           [facility]



               00                                                

 

     Tramadol      0      No                       Notes: Not           [facility]

[facility]



               3-26                               to exceed           l



               03:15:                               400mg/day.           [facility]



               00                                 (Same As:           



                                                  Ultram)           

 

     Dexamethaso      0      No                       Notes: ***           

[facility]



     ne        3-26                               MEDICATION           l



               03:00:                               WASTE ***           [facility]



                                                Product           



                                                  Size: 10           



                                                  mg Product           



                                                  Wasted:           



                                                  ___ mg           

 

     Dexamethaso      -0      No                       Notes: ***           

[facility]



     ne        3-26                               MEDICATION           l



               03:00:                               WASTE ***           [facility]



                                                Product           



                                                  Size: 10           



                                                  mg Product           



                                                  Wasted:           



                                                  ___ mg           

 

     Dexamethaso      -0      No                       Notes: ***           

[facility]



     ne        3-26                               MEDICATION           l



               03:00:                               WASTE ***           [facility]



                                                Product           



                                                  Size: 10           



                                                  mg Product           



                                                  Wasted:           



                                                  ___ mg           

 

     Dexamethaso      -0      No                       Notes: ***           

[facility]



     ne        3-26                               MEDICATION           l



               03:00:                               WASTE ***           [facility]



                                                Product           



                                                  Size: 10           



                                                  mg Product           



                                                  Wasted:           



                                                  ___ mg           

 

     Dexamethaso      -0      No                       Notes: ***           

[facility]



     ne        3-                               MEDICATION           l



               03:00:                               WASTE ***           [facility]



                                                Product           



                                                  Size: 10           



                                                  mg Product           



                                                  Wasted:           



                                                  ___ mg           

 

     Dexamethaso      -0      No                       Notes: ***           

[facility]



     ne        3-                               MEDICATION           l



               03:00:                               WASTE ***           [facility]



                                                Product           



                                                  Size: 10           



                                                  mg Product           



                                                  Wasted:           



                                                  ___ mg           

 

     Dexamethaso      -0      No                       Notes: ***           

[facility]



     ne        3-                               MEDICATION           l



               03:00:                               WASTE ***           [facility]



                                                Product           



                                                  Size: 10           



                                                  mg Product           



                                                  Wasted:           



                                                  ___ mg           

 

     Dilaudid      -0      No                       Notes:           [facility]



               3-26                               Same as           l



               02:58:                               Dilaudid           [facility]



                                                               

 

     Dilaudid            No                       Notes:           [facility]



               3-26                               Same as           l



               02:58:                               Dilaudid           [facility]



                                                               

 

     Dilaudid      -0      No                       Notes:           [facility]



               3-26                               Same as           l



               02:58:                               Dilaudid           [facility]



                                                               

 

     Dilaudid      -0      No                       Notes:           [facility]



               3-26                               Same as           l



               02:58:                               Dilaudid           [facility]



               00                                                

 

     Dilaudid      -0      No                       Notes:           [facility]



               3-26                               Same as           l



               02:58:                               Dilaudid           [facility]



               00                                                

 

     Dilaudid      -0      No                       Notes:           [facility]



               3-26                               Same as           l



               02:58:                               Dilaudid           [facility]



               00                                                

 

     Dilaudid      -0      No                       Notes:           [facility]



               3-26                               Same as           l



               02:58:                               Dilaudid           [facility]



               00                                                

 

     Acetaminoph            No                       Notes: Max           

[facility]



     en        3-25                               acetaminop           l



               22:38:                               hen =           [facility]



               00                                 4000mg/day           



                                                  (4             



                                                  gm/day).           



                                                  (Same as:           



                                                  Tylenol)           

 

     Acetaminoph            No                       Notes: Max           

[facility]



     en        3-25                               acetaminop           l



               22:38:                               hen =           [facility]



               00                                 4000mg/day           



                                                  (4             



                                                  gm/day).           



                                                  (Same as:           



                                                  Tylenol)           

 

     Acetaminoph            No                       Notes: Max           

[facility]



     en        3-25                               acetaminop           l



               22:38:                               hen =           [facility]



               00                                 4000mg/day           



                                                  (4             



                                                  gm/day).           



                                                  (Same as:           



                                                  Tylenol)           

 

     Acetaminoph            No                       Notes: Max           

[facility]



     en        3-25                               acetaminop           l



               22:38:                               hen =           [facility]



               00                                 4000mg/day           



                                                  (4             



                                                  gm/day).           



                                                  (Same as:           



                                                  Tylenol)           

 

     Acetaminoph            No                       Notes: Max           

[facility]



     en        3-25                               acetaminop           l



               22:38:                               hen =           [facility]



               00                                 4000mg/day           



                                                  (4             



                                                  gm/day).           



                                                  (Same as:           



                                                  Tylenol)           

 

     Acetaminoph            No                       Notes: Max           

[facility]



     en        3-25                               acetaminop           l



               22:38:                               hen =           [facility]



               00                                 4000mg/day           



                                                  (4             



                                                  gm/day).           



                                                  (Same as:           



                                                  Tylenol)           

 

     Acetaminoph            No                       Notes: Max           

[facility]



     en        3-25                               acetaminop           l



               22:38:                               hen =           [facility]



               00                                 4000mg/day           



                                                  (4             



                                                  gm/day).           



                                                  (Same as:           



                                                  Tylenol)           

 

     Isolyte S            No                       Notes:           [facility]

a



     PH-7.4      3-25                               (Same as:           l



     (Bolus) IV      22:37:                               Isolyte S           He

rmann



               00                                 PH7.4,           



                                                  Normosol-R           



                                                  PH 7.4,           



                                                  Plasma-Lyt           



                                                  e A )           

 

     Magnesium            No                       Notes:           [facility]

a



     Sulfate      3-25                               WASTE: F/P           l



               22:37:                               - Sink; E           [facility]



               00                                 -              



                                                  Municipal           



                                                  Trash Bin           

 

     Isolyte S            No                       Notes:           [facility]

a



     PH-7.4      3-25                               (Same as:           l



     (Bolus) IV      22:37:                               Isolyte S           He

rmann



               00                                 PH7.4,           



                                                  Normosol-R           



                                                  PH 7.4,           



                                                  Plasma-Lyt           



                                                  e A )           

 

     Magnesium            No                       Notes:           [facility]

a



     Sulfate      3-25                               WASTE: F/P           l



               22:37:                               - Sink; E           [facility]



                                                -              



                                                  Municipal           



                                                  Trash Bin           

 

     Isolyte S            No                       Notes:           [facility]

a



     PH-7.4      3-25                               (Same as:           l



     (Bolus) IV      22:37:                               Isolyte S           He

rmann



               00                                 PH7.4,           



                                                  Normosol-R           



                                                  PH 7.4,           



                                                  Plasma-Lyt           



                                                  e A )           

 

     Magnesium            No                       Notes:           [facility]

a



     Sulfate      3-25                               WASTE: F/P           l



               22:37:                               - Sink; E           [facility]



                                                -              



                                                  Municipal           



                                                  Trash Bin           

 

     Isolyte S            No                       Notes:           [facility]

a



     PH-7.4      3-25                               (Same as:           l



     (Bolus) IV      22:37:                               Isolyte S           He

rmann



               00                                 PH7.4,           



                                                  Normosol-R           



                                                  PH 7.4,           



                                                  Plasma-Lyt           



                                                  e A )           

 

     Magnesium            No                       Notes:           [facility]

a



     Sulfate      3-25                               WASTE: F/P           l



               22:37:                               - Sink; ann



                                                -              



                                                  Municipal           



                                                  Trash Bin           

 

     Isolyte S            No                       Notes:           [facility]

a



     PH-7.4      3-25                               (Same as:           l



     (Bolus) IV      22:37:                               Isolyte S           He

rmann



               00                                 PH7.4,           



                                                  Normosol-R           



                                                  PH 7.4,           



                                                  Plasma-Lyt           



                                                  e A )           

 

     Magnesium            No                       Notes:           [facility]

a



     Sulfate      3-25                               WASTE: F/P           l



               22:37:                               - Sink;            [facility]                                 -              



                                                  Municipal           



                                                  Trash Bin           

 

     Isolyte S            No                       Notes:           [facility]

a



     PH-7.4      3-25                               (Same as:           l



     (Bolus) IV      22:37:                               Isolyte S           He

rmann



               00                                 PH7.4,           



                                                  Normosol-R           



                                                  PH 7.4,           



                                                  Plasma-Lyt           



                                                  e A )           

 

     Magnesium            No                       Notes:           [facility]

a



     Sulfate      3-25                               WASTE: F/P           l



               22:37:                               - Sink;            [facility]



                                                -              



                                                  Municipal           



                                                  Trash Bin           

 

     Isolyte S            No                       Notes:           [facility]

a



     PH-7.4      3-25                               (Same as:           l



     (Bolus) IV      22:37:                               Isolyte S           He

rmann



               00                                 PH7.4,           



                                                  Normosol-R           



                                                  PH 7.4,           



                                                  Plasma-Lyt           



                                                  e A )           

 

     Magnesium            No                       Notes:           [facility]

a



     Sulfate      3-25                               WASTE: F/P           l



               22:37:                               - Sink; E           [facility]



               00                                 -              



                                                  Municipal           



                                                  Trash Bin           

 

     Iohexol      -0      No                       100 mL,           [facility]



               3-25                               Route:           l



               20:04:                               IVP, Drug           [facility]



               00                                 Form:           



                                                  SOLN,           



                                                  Dosing           



                                                  Weight           



                                                  127.727,           



                                                  kg,            



                                                  ONCALL,           



                                                  STAT,           



                                                  Start           



                                                  date:           



                                                  22           



                                                  15:04:00           



                                                  CDT,           



                                                  Duration:           



                                                  1 doses or           



                                                  times,           



                                                  Dose =           



                                                  2.2ml/kg,           



                                                  Max dose =           



                                                  100ml --           



                                                  "To be           



                                                  infused by           



                                                  Radiology           



                                                  Staff           



                                                  ONLY"           

 

     Iohexol      -0      No                       100 mL,           [facility]



               3-25                               Route:           l



               20:04:                               IVP, Drug           [facility]



               00                                 Form:           



                                                  SOLN,           



                                                  Dosing           



                                                  Weight           



                                                  127.727,           



                                                  kg,            



                                                  ONCALL,           



                                                  STAT,           



                                                  Start           



                                                  date:           



                                                  22           



                                                  15:04:00           



                                                  CDT,           



                                                  Duration:           



                                                  1 doses or           



                                                  times,           



                                                  Dose =           



                                                  2.2ml/kg,           



                                                  Max dose =           



                                                  100ml --           



                                                  "To be           



                                                  infused by           



                                                  Radiology           



                                                  Staff           



                                                  ONLY"           

 

     Iohexol      -0      No                       100 mL,           [facility]



               3-25                               Route:           l



               20:04:                               IVP, Drug           [facility]



               00                                 Form:           



                                                  SOLN,           



                                                  Dosing           



                                                  Weight           



                                                  127.727,           



                                                  kg,            



                                                  ONCALL,           



                                                  STAT,           



                                                  Start           



                                                  date:           



                                                  22           



                                                  15:04:00           



                                                  CDT,           



                                                  Duration:           



                                                  1 doses or           



                                                  times,           



                                                  Dose =           



                                                  2.2ml/kg,           



                                                  Max dose =           



                                                  100ml --           



                                                  "To be           



                                                  infused by           



                                                  Radiology           



                                                  Staff           



                                                  ONLY"           

 

     Iohexol      -0      No                       100 mL,           [facility]



               3-25                               Route:           l



               20:04:                               IVP, Drug           [facility]



               00                                 Form:           



                                                  SOLN,           



                                                  Dosing           



                                                  Weight           



                                                  127.727,           



                                                  kg,            



                                                  ONCALL,           



                                                  STAT,           



                                                  Start           



                                                  date:           



                                                  22           



                                                  15:04:00           



                                                  CDT,           



                                                  Duration:           



                                                  1 doses or           



                                                  times,           



                                                  Dose =           



                                                  2.2ml/kg,           



                                                  Max dose =           



                                                  100ml --           



                                                  "To be           



                                                  infused by           



                                                  Radiology           



                                                  Staff           



                                                  ONLY"           

 

     Iohexol      -0      No                       100 mL,           [facility]



               325                               Route:           l



               20:04:                               IVP, Drug           [facility]



               00                                 Form:           



                                                  SOLN,           



                                                  Dosing           



                                                  Weight           



                                                  127.727,           



                                                  kg,            



                                                  ONCALL,           



                                                  STAT,           



                                                  Start           



                                                  date:           



                                                  22           



                                                  15:04:00           



                                                  CDT,           



                                                  Duration:           



                                                  1 doses or           



                                                  times,           



                                                  Dose =           



                                                  2.2ml/kg,           



                                                  Max dose =           



                                                  100ml --           



                                                  "To be           



                                                  infused by           



                                                  Radiology           



                                                  Staff           



                                                  ONLY"           

 

     Iohexol            No                       100 mL,           [facility]



               3-25                               Route:           l



               20:04:                               IVP, Drug           [facility]



               00                                 Form:           



                                                  SOLN,           



                                                  Dosing           



                                                  Weight           



                                                  127.727,           



                                                  kg,            



                                                  ONCALL,           



                                                  STAT,           



                                                  Start           



                                                  date:           



                                                  22           



                                                  15:04:00           



                                                  CDT,           



                                                  Duration:           



                                                  1 doses or           



                                                  times,           



                                                  Dose =           



                                                  2.2ml/kg,           



                                                  Max dose =           



                                                  100ml --           



                                                  "To be           



                                                  infused by           



                                                  Radiology           



                                                  Staff           



                                                  ONLY"           

 

     Iohexol            No                       100 mL,           [facility]



               3-25                               Route:           l



               20:04:                               IVP, Drug           [facility]



                                                Form:           



                                                  SOLN,           



                                                  Dosing           



                                                  Weight           



                                                  127.727,           



                                                  kg,            



                                                  ONCALL,           



                                                  STAT,           



                                                  Start           



                                                  date:           



                                                  22           



                                                  15:04:00           



                                                  CDT,           



                                                  Duration:           



                                                  1 doses or           



                                                  times,           



                                                  Dose =           



                                                  2.2ml/kg,           



                                                  Max dose =           



                                                  100ml --           



                                                  "To be           



                                                  infused by           



                                                  Radiology           



                                                  Staff           



                                                  ONLY"           

 

     Docusate            No                       Notes:           [facility]



               3-25                               (Same as:           l



               14:00:                               Colace)           [facility]



                                                (Do Not           



                                                  Crush)           

 

     sennosides      No                       Notes:           [facility]

[facility]



     USP       3-25                               (Same as:           l



               14:00:                               Senokot)           [facility]



                                                               

 

     Saline            No                       Notes:           [facility]



     Flush 0.9%      3-25                               preservati           l



               14:00:                               ve free.           [facility]                                                

 

     Docusate            No                       Notes:           [facility]



               3-25                               (Same as:           l



               14:00:                               Colace)           [facility]



                                                (Do Not           



                                                  Crush)           

 

     sennosides      No                       Notes:           [facility]

rajeev



     USP       3-25                               (Same as:           l



               14:00:                               Senokot)           [facility]



               00                                                

 

     Saline            No                       Notes:           [facility]



     Flush 0.9%      3-25                               preservati           l



               14:00:                               ve free.           [facility]



                                                               

 

     Docusate            No                       Notes:           [facility]



               3-25                               (Same as:           l



               14:00:                               Colace)           [facility]



               00                                 (Do Not           



                                                  Crush)           

 

     sennosides,            No                       Notes:           [facility]

rajeev



     USP       3-25                               (Same as:           l



               14:00:                               Senokot)           [facility]



               00                                                

 

     Saline            No                       Notes:           [facility]



     Flush 0.9%      3-25                               preservati           l



               14:00:                               ve free.           [facility]



                                                               

 

     Docusate            No                       Notes:           [facility]



               3-25                               (Same as:           l



               14:00:                               Colace)           [facility]



                                                (Do Not           



                                                  Crush)           

 

     sennosides,            No                       Notes:           [facility]

[facility]



     USP       3-25                               (Same as:           l



               14:00:                               Senokot)           [facility]



                                                               

 

     Saline            No                       Notes:           [facility]



     Flush 0.9%      3-25                               preservati           l



               14:00:                               ve free.           [facility]



                                                               

 

     Docusate            No                       Notes:           [facility]



               3-25                               (Same as:           l



               14:00:                               Colace)           [facility]



                                                (Do Not           



                                                  Crush)           

 

     sennosides,            No                       Notes:           [facility]

[facility]je[facility]



     USP       3-25                               (Same as:           l



               14:00:                               Senokot)           [facility]                                                

 

     Saline            No                       Notes:           [facility]



     Flush 0.9%      3-25                               preservati           l



               14:00:                               ve free.           [facility]                                                

 

     Docusate            No                       Notes:           [facility]



               3-25                               (Same as:           l



               14:00:                               Colace)           [facility]



                                                (Do Not           



                                                  Crush)           

 

     sennosides,            No                       Notes:           [facility]

[facility]jeev



     USP       3-25                               (Same as:           l



               14:00:                               Senokot)           [facility]                                                

 

     Saline            No                       Notes:           [facility]



     Flush 0.9%      3-25                               preservati           l



               14:00:                               ve free.           [facility]                                                

 

     Docusate            No                       Notes:           [facility]



               3-25                               (Same as:           l



               14:00:                               Colace)           [facility]



                                                (Do Not           



                                                  Crush)           

 

     sennosides,            No                       Notes:           [facility]

[facility]je[facility]



     USP       3-25                               (Same as:           l



               14:00:                               Senokot)           [facility]



                                                               

 

     Saline            No                       Notes:           [facility]



     Flush 0.9%      3-25                               preservati           l



               14:00:                               ve free.                                                           

 

     influenza            Yes                      Notes:           [facility]

a



     virus      3-25                               (Same as:           l



     vaccine,      06:10:                               Fluzone           [facility]

n



     inactivated      26                                 [place]           



                                                  nt,            



                                                  Fluarix           



                                                  [place]           



                                                  nt) For           



                                                  patients 6           



                                                  - 35           



                                                  months of           



                                                  age (0.5           



                                                  mL IM) For           



                                                  3 years of           



                                                  age and           



                                                  older (0.5           



                                                  mL IM)           



                                                  Shake well           



                                                  before use           

 

     influenza            Yes                      Notes:           [facility]

a



     virus      3-25                               (Same as:           l



     vaccine,      06:10:                               Fluzone           [facility]

n



     inactivated      26                                 [place]           



                                                  nt,            



                                                  Fluarix           



                                                  [place]           



                                                  nt) For           



                                                  patients 6           



                                                  - 35           



                                                  months of           



                                                  age (0.5           



                                                  mL IM) For           



                                                  3 years of           



                                                  age and           



                                                  older (0.5           



                                                  mL IM)           



                                                  Shake well           



                                                  before use           

 

     influenza            Yes                      Notes:           [facility]

a



     virus      3-25                               (Same as:           l



     vaccine,      06:10:                               Fluzone           [facility]

n



     inactivated      26                                 [place]           



                                                  nt,            



                                                  Fluarix           



                                                  [place]           



                                                  nt) For           



                                                  patients 6           



                                                  - 35           



                                                  months of           



                                                  age (0.5           



                                                  mL IM) For           



                                                  3 years of           



                                                  age and           



                                                  older (0.5           



                                                  mL IM)           



                                                  Shake well           



                                                  before use           

 

     influenza            Yes                      Notes:           [facility]

a



     virus      3-25                               (Same as:           l



     vaccine,      06:10:                               Fluzone           [facility]

n



     inactivated      26                                 [place]           



                                                  nt,            



                                                  Fluarix           



                                                  [place]           



                                                  nt) For           



                                                  patients 6           



                                                  - 35           



                                                  months of           



                                                  age (0.5           



                                                  mL IM) For           



                                                  3 years of           



                                                  age and           



                                                  older (0.5           



                                                  mL IM)           



                                                  Shake well           



                                                  before use           

 

     influenza            Yes                      Notes:           [facility]

a



     virus      3-25                               (Same as:           l



     vaccine,      06:10:                               Fluzone           [facility]

n



     inactivated      26                                 [place]           



                                                  nt,            



                                                  Fluarix           



                                                  [place]           



                                                  nt) For           



                                                  patients 6           



                                                  - 35           



                                                  months of           



                                                  age (0.5           



                                                  mL IM) For           



                                                  3 years of           



                                                  age and           



                                                  older (0.5           



                                                  mL IM)           



                                                  Shake well           



                                                  before use           

 

     influenza            Yes                      Notes:           [facility]

a



     virus      3-25                               (Same as:           l



     vaccine,      06:10:                               Fluzone           [facility]

n



     inactivated      26                                 [place]           



                                                  nt,            



                                                  Fluarix           



                                                  [place]           



                                                  nt) For           



                                                  patients 6           



                                                  - 35           



                                                  months of           



                                                  age (0.5           



                                                  mL IM) For           



                                                  3 years of           



                                                  age and           



                                                  older (0.5           



                                                  mL IM)           



                                                  Shake well           



                                                  before use           

 

     influenza            Yes                      Notes:           [facility]

a



     virus      3-25                               (Same as:           l



     vaccine,      06:10:                               Fluzone           [facility]

n



     inactivated      26                                 [place]           



                                                  nt,            



                                                  Fluarix           



                                                  [place]           



                                                  nt) For           



                                                  patients 6           



                                                  - 35           



                                                  months of           



                                                  age (0.5           



                                                  mL IM) For           



                                                  3 years of           



                                                  age and           



                                                  older (0.5           



                                                  mL IM)           



                                                  Shake well           



                                                  before use           

 

     Lorazepam            No                       Notes:           [facility]

a



               3-25                               (Same as:           l



               06:05:                               Ativan)           Jose                                                

 

     Methocarbam            No                       Notes:           [facility]

[facility]



     ol        3-25                               (Same           l



               06:05:                               as:Robaxin           [facility]oe                                 )              

 

     Lorazepam            No                       Notes:           [facility]

a



               3-25                               (Same as:           l



               06:05:                               Ativan)           Jose                                                

 

     Methocarbam            No                       Notes:           [facility]

rajeev



     ol        3-25                               (Same           l



               06:05:                               as:Robaxin           Roe                                 )              

 

     Lorazepam            No                       Notes:           [facility]

a



               3-25                               (Same as:           l



               06:05:                               Ativan)           Jose                                                

 

     Methocarbam            No                       Notes:           [facility]

rajeev



     ol        3-25                               (Same           l



               06:05:                               as:Robaxin           Roe                                 )              

 

     Lorazepam            No                       Notes:           [facility]

a



               3-25                               (Same as:           l



               06:05:                               Ativan)           Jose                                                

 

     Methocarbam            No                       Notes:           [facility]

rajeev



     ol        3-25                               (Same           l



               06:05:                               as:Robaxin           Jose                                 )              

 

     Lorazepam            No                       Notes:           [facility]

a



               3-25                               (Same as:           l



               06:05:                               Ativan)           Jose                                                

 

     Methocarbam            No                       Notes:           [facility]

rajeev



     ol        3-25                               (Same           l



               06:05:                               as:Robaxin                                            )              

 

     Lorazepam            No                       Notes:           [facility]

a



               3-25                               (Same as:           l



               06:05:                               Ativan)           Roe                                                

 

     Methocarbam            No                       Notes:           [facility]

rajeev



     ol        3-25                               (Same           l



               06:05:                               as:Robaxin           Jose                                 )              

 

     Lorazepam            No                       Notes:           [facility]

a



               3-25                               (Same as:           l



               06:05:                               Ativan)           Jose                                                

 

     Methocarbam            No                       Notes:           [facility]

ra[facility]



     ol        3-25                               (Same           l



               06:05:                               as:Robaxin           Roe                                 )              

 

     Sodium            No                       1,000 mL,           [facility]

a



     Chloride      3-25                               Rate: 50           l



     0.9% IV      06:03:                               ml/hr,           [facility]



     1,000 mL      00                                 Infuse           



                                                  over: 20           



                                                  hr, Route:           



                                                  IV, Dosing           



                                                  Weight           



                                                  131.818           



                                                  kg, Total           



                                                  Volume:           



                                                  1,000,           



                                                  Start           



                                                  date:           



                                                  22           



                                                  1:03:00           



                                                  CDT,           



                                                  Duration:           



                                                  30 day,           



                                                  Stop date:           



                                                  22           



                                                  1:02:00           



                                                  CDT, BSA:           



                                                  2.4 m2, 0           

 

     Labetalol            No                       10 mg, 2           [facility]

rajeev



               3-25                               mL, Route:           l



               06:03:                               IVP, Drug                                            form: INJ,           



                                                  Q15Min,           



                                                  Dosing           



                                                  Weight           



                                                  131.818,           



                                                  kg, Start           



                                                  date:           



                                                  22           



                                                  1:03:00           



                                                  CDT,           



                                                  Duration:           



                                                  3 doses or           



                                                  times,           



                                                  Stop date:           



                                                  22           



                                                  1:33:00           



                                                  CDT, 0           

 

     Hydralazine            No                       Notes:           [facility]

rajeev



               3-25                               (Same as:           l



               06:03:                               Apresoline                                            ) Push           



                                                  over 5           



                                                  minutes           

 

     Ondansetron            No                       /= 4           [facility]

a



               3-25                               years,           l



               06:03:                               Pediatric                                            Dosing           

 

     Acetaminoph            No                       Notes: Do           M

[facility]



     en 325 MG /      3-25                               not exceed           l



     Hydrocodone      06:03:                               4gm/day of           

R[facility]



     Bitartrate      00                                 acetaminop           



     10 MG Oral                                         hen. (Same           



     Tablet                                         as: Norco           



     [Norco                                         325/10)           



     10/325]                                                        

 

     Dilaudid            No                       Notes:           [facility]



               3-25                               Same as           l



               06:03:                               Dilaudid                                                           

 

     Benadryl            No                       Notes:           [facility]



               3-25                               (Same as:           l



               06:03:                               Benadryl)           Jose



               00                                                

 

     phenol            No                       Notes:           [facility]



               3-25                               Chlorasept           l



               06:03:                               ic Spray                                            (Same as:           



                                                  Chlorasept           



                                                  ic, Sore           



                                                  Throat           



                                                  Spray)           



                                                  WASTE: F/P           



                                                  - Black; E           



                                                  -              



                                                  Municipal           



                                                  Trash Bin           

 

     Bisacodyl            No                       Notes:           [facility]

a



               3-25                               (Same As:           l



               06:03:                               Dulcolax,           Roe



                                                Bisco-Lax)           

 

     Robaxin            No                       Notes:           [facility]



               3-25                               (Same           l



               06:03:                               as:Robaxin                                            )              

 

     Melatonin 3            No                       Notes:           [facility]

rajeev



     MG Extended      3-25                               (Same as:           l



     Release      06:03:                               Melatonin)           [facility]

nguyen



     Tablet      00                                                

 

     Tylenol            No                       Notes: Do           [facility]

ia



               3-25                               not exceed           l



               06:03:                               4 gm/day.           Jose



               00                                 (Same as:           



                                                  Tylenol)           

 

     Reglan            No                       Notes:           [facility]



               3-25                               (Same as:           l



               06:03:                               Reglan)           Roe                                                

 

     Phenergan            No                       Notes: Do           [facility]

[facility]



               3-25                               not give           l



               06:03:                               IV push.           Jose                                 (Same as:           



                                                  Phenergan)           

 

     Saline            No                       Notes:           [facility]



     Flush 0.9%      3-25                               preservati           l



               06:03:                               ve free.           Jose                                                

 

     Sodium            No                       1,000 mL,           [facility]

a



     Chloride      3-25                               Rate: 50           l



     0.9% IV      06:03:                               ml/hr,           [facility]



     1,000 mL      00                                 Infuse           



                                                  over: 20           



                                                  hr, Route:           



                                                  IV, Dosing           



                                                  Weight           



                                                  131.818           



                                                  kg, Total           



                                                  Volume:           



                                                  1,000,           



                                                  Start           



                                                  date:           



                                                  22           



                                                  1:03:00           



                                                  CDT,           



                                                  Duration:           



                                                  30 day,           



                                                  Stop date:           



                                                  22           



                                                  1:02:00           



                                                  CDT, BSA:           



                                                  2.4 m2, 0           

 

     Labetalol            No                       10 mg, 2           [facility]

raje[facility]



               3-25                               mL, Route:           l



               06:03:                               IVP, Drug                                            form: INJ,           



                                                  Q15Min,           



                                                  Dosing           



                                                  Weight           



                                                  131.818,           



                                                  kg, Start           



                                                  date:           



                                                  22           



                                                  1:03:00           



                                                  CDT,           



                                                  Duration:           



                                                  3 doses or           



                                                  times,           



                                                  Stop date:           



                                                  22           



                                                  1:33:00           



                                                  CDT, 0           

 

     Hydralazine            No                       Notes:           [facility]

rajeev



               3-25                               (Same as:           l



               06:03:                               Apresoline                                            ) Push           



                                                  over 5           



                                                  minutes           

 

     Ondansetron            No                       /= 4           [facility]

a



               3-25                               years,           l



               06:03:                               Pediatric                                            Dosing           

 

     Acetaminoph            No                       Notes: Do           M

[facility]



     en 325 MG /      3-25                               not exceed           l



     Hydrocodone      06:03:                               4gm/day of           

[facility]



     Bitartrate      00                                 acetaminop           



     10 MG Oral                                         hen. (Same           



     Tablet                                         as: Norco           



     [Norco                                         325/10)           



     10/325]                                                        

 

     Dilaudid            No                       Notes:           [facility]



               3-25                               Same as           l



               06:03:                               Dilaudid                                                           

 

     Benadryl            No                       Notes:           [facility]



               3-25                               (Same as:           l



               06:03:                               Benadryl)           Jose



                                                               

 

     phenol            No                       Notes:           [facility]



               3-25                               Chlorasept           l



               06:03:                               ic Spray                                            (Same as:           



                                                  Chlorasept           



                                                  ic, Sore           



                                                  Throat           



                                                  Spray)           



                                                  WASTE: F/P           



                                                  - Black; E           



                                                  -              



                                                  Municipal           



                                                  Trash Bin           

 

     Bisacodyl            No                       Notes:           [facility]

a



               3-25                               (Same As:           l



               06:03:                               Dulcolax,                                            Bisco-Lax)           

 

     Robaxin            No                       Notes:           [facility]



               3-25                               (Same           l



               06:03:                               as:Robaxin                                            )              

 

     Melatonin 3            No                       Notes:           [facility]

rajeev



     MG Extended      3-25                               (Same as:           l



     Release      06:03:                               Melatonin)           [facility]

[place]



     Tablet      00                                                

 

     Tylenol            No                       Notes: Do           [facility]

[facility]



               3-25                               not exceed           l



               06:03:                               4 gm/day.           [facility]



                                                (Same as:           



                                                  Tylenol)           

 

     Reglan            No                       Notes:           [facility]



               3-25                               (Same as:           l



               06:03:                               Reglan)                                                           

 

     Phenergan            No                       Notes: Do           [facility]

[facility]



               3-25                               not give           l



               06:03:                               IV push.           [facility]



                                                (Same as:           



                                                  Phenergan)           

 

     Saline            No                       Notes:           [facility]



     Flush 0.9%      3-25                               preservati           l



               06:03:                               ve free.                                                           

 

     Sodium            No                       1,000 mL,           [facility]

a



     Chloride      3-25                               Rate: 50           l



     0.9% IV      06:03:                               ml/hr,           [facility]



     1,000 mL      00                                 Infuse           



                                                  over: 20           



                                                  hr, Route:           



                                                  IV, Dosing           



                                                  Weight           



                                                  131.818           



                                                  kg, Total           



                                                  Volume:           



                                                  1,000,           



                                                  Start           



                                                  date:           



                                                  22           



                                                  1:03:00           



                                                  CDT,           



                                                  Duration:           



                                                  30 day,           



                                                  Stop date:           



                                                  22           



                                                  1:02:00           



                                                  CDT, BSA:           



                                                  2.4 m2, 0           

 

     Labetalol            No                       10 mg, 2           [facility]

[facility]



               3-25                               mL, Route:           l



               06:03:                               IVP, Drug                                            form: INJ,           



                                                  Q15Min,           



                                                  Dosing           



                                                  Weight           



                                                  131.818,           



                                                  kg, Start           



                                                  date:           



                                                  22           



                                                  1:03:00           



                                                  CDT,           



                                                  Duration:           



                                                  3 doses or           



                                                  times,           



                                                  Stop date:           



                                                  22           



                                                  1:33:00           



                                                  CDT, 0           

 

     Hydralazine            No                       Notes:           [facility]

rajeev



               3-25                               (Same as:           l



               06:03:                               Apresoline                                            ) Push           



                                                  over 5           



                                                  minutes           

 

     Ondansetron            No                       /= 4           [facility]

a



               3-25                               years,           l



               06:03:                               Pediatric           Jose



                                                Dosing           

 

     Acetaminoph            No                       Notes: Do           [facility]

[facility]



     en 325 MG /      3-25                               not exceed           l



     Hydrocodone      06:03:                               4gm/day of           

[facility]



     Bitartrate      00                                 acetaminop           



     10 MG Oral                                         hen. (Same           



     Tablet                                         as: Norco           



     [Norco                                         325/10)           



     10/325]                                                        

 

     Dilaudid            No                       Notes:           [facility]



               3-25                               Same as           l



               06:03:                               Dilaudid                                                           

 

     Benadryl            No                       Notes:           [facility]



               3-25                               (Same as:           l



               06:03:                               Benadryl)           [facility]



                                                               

 

     phenol            No                       Notes:           [facility]



               3-25                               Chlorasept           l



               06:03:                               ic Spray                                            (Same as:           



                                                  Chlorasept           



                                                  ic, Sore           



                                                  Throat           



                                                  Spray)           



                                                  WASTE: F/P           



                                                  - Black; E           



                                                  -              



                                                  Municipal           



                                                  Trash Bin           

 

     Bisacodyl            No                       Notes:           [facility]

a



               3-25                               (Same As:           l



               06:03:                               Dulcolax,           Jose                                 Bisco-Lax)           

 

     Robaxin            No                       Notes:           [facility]



               3-25                               (Same           l



               06:03:                               as:Robaxin                                            )              

 

     Melatonin 3            No                       Notes:           [facility]

[facility]



     MG Extended      3-25                               (Same as:           l



     Release      06:03:                               Melatonin)           [facility]

[place]



     Tablet      00                                                

 

     Tylenol            No                       Notes: Do           [facility]

ia



               3-25                               not exceed           l



               06:03:                               4 gm/day.           [facility]



                                                (Same as:           



                                                  Tylenol)           

 

     Reglan            No                       Notes:           [facility]



               3-25                               (Same as:           l



               06:03:                               Reglan)           [facility]



                                                               

 

     Phenergan            No                       Notes: Do           [facility]

[facility]



               3-25                               not give           l



               06:03:                               IV push.                                            (Same as:           



                                                  Phenergan)           

 

     Saline            No                       Notes:           [facility]



     Flush 0.9%      3-25                               preservati           l



               06:03:                               ve free.           Jose                                                

 

     Sodium            No                       1,000 mL,           [facility]

a



     Chloride      3-25                               Rate: 50           l



     0.9% IV      06:03:                               ml/hr,           [facility]



     1,000 mL      00                                 Infuse           



                                                  over: 20           



                                                  hr, Route:           



                                                  IV, Dosing           



                                                  Weight           



                                                  131.818           



                                                  kg, Total           



                                                  Volume:           



                                                  1,000,           



                                                  Start           



                                                  date:           



                                                  22           



                                                  1:03:00           



                                                  CDT,           



                                                  Duration:           



                                                  30 day,           



                                                  Stop date:           



                                                  22           



                                                  1:02:00           



                                                  CDT, BSA:           



                                                  2.4 m2, 0           

 

     Labetalol            No                       10 mg, 2           [facility]

[facility]



               3-25                               mL, Route:           l



               06:03:                               IVP, Drug                                            form: INJ,           



                                                  Q15Min,           



                                                  Dosing           



                                                  Weight           



                                                  131.818,           



                                                  kg, Start           



                                                  date:           



                                                  22           



                                                  1:03:00           



                                                  CDT,           



                                                  Duration:           



                                                  3 doses or           



                                                  times,           



                                                  Stop date:           



                                                  22           



                                                  1:33:00           



                                                  CDT, 0           

 

     Hydralazine            No                       Notes:           [facility]

raje[facility]



               3-25                               (Same as:           l



               06:03:                               Apresoline                                            ) Push           



                                                  over 5           



                                                  minutes           

 

     Ondansetron            No                       /= 4           [facility]

a



               3-25                               years,           l



               06:03:                               Pediatric                                            Dosing           

 

     Acetaminoph            No                       Notes: Do           M

[facility]



     en 325 MG /      3-25                               not exceed           l



     Hydrocodone      06:03:                               4gm/day of           

[facility]



     Bitartrate      00                                 acetaminop           



     10 MG Oral                                         hen. (Same           



     Tablet                                         as: Norco           



     [Norco                                         325/10)           



     10/325]                                                        

 

     Dilaudid            No                       Notes:           [facility]



               3-25                               Same as           l



               06:03:                               Dilaudid                                                           

 

     Benadryl            No                       Notes:           [facility]



               3-25                               (Same as:           l



               06:03:                               Benadryl)           [facility]                                                

 

     phenol            No                       Notes:           [facility]



               3-25                               Chlorasept           l



               06:03:                               ic Spray                                            (Same as:           



                                                  Chlorasept           



                                                  ic, Sore           



                                                  Throat           



                                                  Spray)           



                                                  WASTE: F/P           



                                                  - Black; E           



                                                  -              



                                                  Municipal           



                                                  Trash Bin           

 

     Bisacodyl            No                       Notes:           [facility]

a



               3-25                               (Same As:           l



               06:03:                               Dulcolax,           Jose



                                                Bisco-Lax)           

 

     Robaxin            No                       Notes:           [facility]



               3-25                               (Same           l



               06:03:                               as:Robaxin                                            )              

 

     Melatonin 3            No                       Notes:           [facility]

[facility]



     MG Extended      3-25                               (Same as:           l



     Release      06:03:                               Melatonin)           [facility]

[place]



     Tablet      00                                                

 

     Tylenol            No                       Notes: Do           [facility]

[facility]



               3-25                               not exceed           l



               06:03:                               4 gm/day.           [facility]



               00                                 (Same as:           



                                                  Tylenol)           

 

     Reglan            No                       Notes:           [facility]



               3-25                               (Same as:           l



               06:03:                               Reglan)                                                           

 

     Phenergan            No                       Notes: Do           [facility]

[facility]



               3-25                               not give           l



               06:03:                               IV push.                                            (Same as:           



                                                  Phenergan)           

 

     Saline            No                       Notes:           [facility]



     Flush 0.9%      3-25                               preservati           l



               06:03:                               ve free.                                                           

 

     Sodium            No                       1,000 mL,           [facility]

a



     Chloride      3-25                               Rate: 50           l



     0.9% IV      06:03:                               ml/hr,           [facility]



     1,000 mL      00                                 Infuse           



                                                  over: 20           



                                                  hr, Route:           



                                                  IV, Dosing           



                                                  Weight           



                                                  131.818           



                                                  kg, Total           



                                                  Volume:           



                                                  1,000,           



                                                  Start           



                                                  date:           



                                                  22           



                                                  1:03:00           



                                                  CDT,           



                                                  Duration:           



                                                  30 day,           



                                                  Stop date:           



                                                  22           



                                                  1:02:00           



                                                  CDT, BSA:           



                                                  2.4 m2, 0           

 

     Labetalol            No                       10 mg, 2           [facility]

[facility]



               3-25                               mL, Route:           l



               06:03:                               IVP, Drug                                            form: INJ,           



                                                  Q15Min,           



                                                  Dosing           



                                                  Weight           



                                                  131.818,           



                                                  kg, Start           



                                                  date:           



                                                  22           



                                                  1:03:00           



                                                  CDT,           



                                                  Duration:           



                                                  3 doses or           



                                                  times,           



                                                  Stop date:           



                                                  22           



                                                  1:33:00           



                                                  CDT, 0           

 

     Hydralazine            No                       Notes:           [facility]

[facility]



               3-25                               (Same as:           l



               06:03:                               Apresoline                                            ) Push           



                                                  over 5           



                                                  minutes           

 

     Ondansetron            No                       /= 4           [facility]

a



               3-25                               years,           l



               06:03:                               Pediatric                                            Dosing           

 

     Acetaminoph            No                       Notes: Do           M

[facility]



     en 325 MG /      3-25                               not exceed           l



     Hydrocodone      06:03:                               4gm/day of           

[facility]



     Bitartrate      00                                 acetaminop           



     10 MG Oral                                         hen. (Same           



     Tablet                                         as: Norco           



     [Norco                                         325/10)           



     10/325]                                                        

 

     Dilaudid            No                       Notes:           [facility]



               3-25                               Same as           l



               06:03:                               Dilaudid           Jose



               00                                                

 

     Benadryl            No                       Notes:           [facility]



               3-25                               (Same as:           l



               06:03:                               Benadryl)           [facility]



               00                                                

 

     phenol            No                       Notes:           [facility]



               3-25                               Chlorasept           l



               06:03:                               ic Spray           [facility]



                                                (Same as:           



                                                  Chlorasept           



                                                  ic, Sore           



                                                  Throat           



                                                  Spray)           



                                                  WASTE: F/P           



                                                  - Black; E           



                                                  -              



                                                  Municipal           



                                                  Trash Bin           

 

     Bisacodyl            No                       Notes:           [facility]

a



               3-25                               (Same As:           l



               06:03:                               Dulcolax,           [facility]



                                                Bisco-Lax)           

 

     Robaxin            No                       Notes:           [facility]



               3-25                               (Same           l



               06:03:                               as:Robaxin                                            )              

 

     Melatonin 3            No                       Notes:           [facility]

rajeev



     MG Extended      3-25                               (Same as:           l



     Release      06:03:                               Melatonin)           [facility]

nguyen



     Tablet      00                                                

 

     Tylenol            No                       Notes: Do           [facility]

ia



               3-25                               not exceed           l



               06:03:                               4 gm/day.           [facility]



               00                                 (Same as:           



                                                  Tylenol)           

 

     Reglan            No                       Notes:           [facility]



               3-25                               (Same as:           l



               06:03:                               Reglan)           [facility]



               00                                                

 

     Phenergan            No                       Notes: Do           [facility]

[facility]



               3-25                               not give           l



               06:03:                               IV push.           [facility]



                                                (Same as:           



                                                  Phenergan)           

 

     Saline            No                       Notes:           [facility]



     Flush 0.9%      3-25                               preservati           l



               06:03:                               ve free.           [facility]



               00                                                

 

     Sodium            No                       1,000 mL,           [facility]

a



     Chloride      3-25                               Rate: 50           l



     0.9% IV      06:03:                               ml/hr,           [facility]



     1,000 mL      00                                 Infuse           



                                                  over: 20           



                                                  hr, Route:           



                                                  IV, Dosing           



                                                  Weight           



                                                  131.818           



                                                  kg, Total           



                                                  Volume:           



                                                  1,000,           



                                                  Start           



                                                  date:           



                                                  22           



                                                  1:03:00           



                                                  CDT,           



                                                  Duration:           



                                                  30 day,           



                                                  Stop date:           



                                                  22           



                                                  1:02:00           



                                                  CDT, BSA:           



                                                  2.4 m2, 0           

 

     Labetalol            No                       10 mg, 2           [facility]

[facility]



               3-25                               mL, Route:           l



               06:03:                               IVP, Drug                                            form: INJ,           



                                                  Q15Min,           



                                                  Dosing           



                                                  Weight           



                                                  131.818,           



                                                  kg, Start           



                                                  date:           



                                                  22           



                                                  1:03:00           



                                                  CDT,           



                                                  Duration:           



                                                  3 doses or           



                                                  times,           



                                                  Stop date:           



                                                  22           



                                                  1:33:00           



                                                  CDT, 0           

 

     Hydralazine            No                       Notes:           [facility]

rajeev



               3-25                               (Same as:           l



               06:03:                               Apresoline                                            ) Push           



                                                  over 5           



                                                  minutes           

 

     Ondansetron            No                       /= 4           [facility]

a



               3-25                               years,           l



               06:03:                               Pediatric                                            Dosing           

 

     Acetaminoph            No                       Notes: Do           M

[facility]



     en 325 MG /      3-25                               not exceed           l



     Hydrocodone      06:03:                               4gm/day of           





     Bitartrate      00                                 acetaminop           



     10 MG Oral                                         hen. (Same           



     Tablet                                         as: Norco           



     [Norco                                         325/10)           



     10/325]                                                        

 

     Dilaudid            No                       Notes:           [facility]



               3-25                               Same as           l



               06:03:                               Dilaudid           [facility]



               00                                                

 

     Benadryl            No                       Notes:           [facility]



               3-25                               (Same as:           l



               06:03:                               Benadryl)           [facility]



               00                                                

 

     phenol            No                       Notes:           [facility]



               3-25                               Chlorasept           l



               06:03:                               ic Spray                                            (Same as:           



                                                  Chlorasept           



                                                  ic, Sore           



                                                  Throat           



                                                  Spray)           



                                                  WASTE: F/P           



                                                  - Black; E           



                                                  -              



                                                  Municipal           



                                                  Trash Bin           

 

     Bisacodyl            No                       Notes:           [facility]

a



               3-25                               (Same As:           l



               06:03:                               Dulcolax,           [facility]



                                                Bisco-Lax)           

 

     Robaxin            No                       Notes:           [facility]



               3-25                               (Same           l



               06:03:                               as:Robaxin                                            )              

 

     Melatonin 3            No                       Notes:           [facility]

rajeev



     MG Extended      3-25                               (Same as:           l



     Release      06:03:                               Melatonin)           [facility]

ng[facility]



     Tablet      00                                                

 

     Tylenol            No                       Notes: Do           [facility]

ia



               3-25                               not exceed           l



               06:03:                               4 gm/day.           [facility]



               00                                 (Same as:           



                                                  Tylenol)           

 

     Reglan            No                       Notes:           [facility]



               3-25                               (Same as:           l



               06:03:                               Reglan)           [facility]                                                

 

     Phenergan            No                       Notes: Do           [facility]

oria



               3-25                               not give           l



               06:03:                               IV push.           [facility]                                 (Same as:           



                                                  Phenergan)           

 

     Saline            No                       Notes:           [facility]



     Flush 0.9%      3-25                               preservati           l



               06:03:                               ve free.           [facility]                                                

 

     Sodium            No                       1,000 mL,           [facility]

a



     Chloride      3-25                               Rate: 50           l



     0.9% IV      06:03:                               ml/hr,           [facility]



     1,000 mL      00                                 Infuse           



                                                  over: 20           



                                                  hr, Route:           



                                                  IV, Dosing           



                                                  Weight           



                                                  131.818           



                                                  kg, Total           



                                                  Volume:           



                                                  1,000,           



                                                  Start           



                                                  date:           



                                                  22           



                                                  1:03:00           



                                                  CDT,           



                                                  Duration:           



                                                  30 day,           



                                                  Stop date:           



                                                  22           



                                                  1:02:00           



                                                  CDT, BSA:           



                                                  2.4 m2, 0           

 

     Labetalol            No                       10 mg, 2           [facility]

[facility]



               3-25                               mL, Route:           l



               06:03:                               IVP, Drug                                            form: INJ,           



                                                  Q15Min,           



                                                  Dosing           



                                                  Weight           



                                                  131.818,           



                                                  kg, Start           



                                                  date:           



                                                  22           



                                                  1:03:00           



                                                  CDT,           



                                                  Duration:           



                                                  3 doses or           



                                                  times,           



                                                  Stop date:           



                                                  22           



                                                  1:33:00           



                                                  CDT, 0           

 

     Hydralazine            No                       Notes:           [facility]

[facility]



               3-25                               (Same as:           l



               06:03:                               Apresoline                                            ) Push           



                                                  over 5           



                                                  minutes           

 

     Ondansetron            No                       /= 4           [facility]

a



               3-25                               years,           l



               06:03:                               Pediatric                                            Dosing           

 

     Acetaminoph            No                       Notes: Do           M

[facility]



     en 325 MG /      3-25                               not exceed           l



     Hydrocodone      06:03:                               4gm/day of           

Jose



     Bitartrate      00                                 acetaminop           



     10 MG Oral                                         hen. (Same           



     Tablet                                         as: Norco           



     [Norco                                         325/10)           



     10/325]                                                        

 

     Dilaudid            No                       Notes:           [facility]



               3-25                               Same as           l



               06:03:                               Dilaudid                                                           

 

     Benadryl            No                       Notes:           [facility]



               3-25                               (Same as:           l



               06:03:                               Benadryl)           [facility]                                                

 

     phenol            No                       Notes:           [facility]



               3-25                               Chlorasept           l



               06:03:                               ic Spray                                            (Same as:           



                                                  Chlorasept           



                                                  ic, Sore           



                                                  Throat           



                                                  Spray)           



                                                  WASTE: F/P           



                                                  - Black; E           



                                                  -              



                                                  Municipal           



                                                  Trash Bin           

 

     Bisacodyl            No                       Notes:           [facility]

a



               3-25                               (Same As:           l



               06:03:                               Dulcolax,                                            Bisco-Lax)           

 

     Robaxin            No                       Notes:           [facility]



               3-25                               (Same           l



               06:03:                               as:Robaxin                                            )              

 

     Melatonin 3            No                       Notes:           [facility]

rajeev



     MG Extended      3-25                               (Same as:           l



     Release      06:03:                               Melatonin)           [facility]

ng[facility]



     Tablet      00                                                

 

     Tylenol            No                       Notes: Do           [facility]

ia



               3-25                               not exceed           l



               06:03:                               4 gm/day.           [facility]



                                                (Same as:           



                                                  Tylenol)           

 

     Reglan            No                       Notes:           [facility]



               3-25                               (Same as:           l



               06:03:                               Reglan)                                                           

 

     Phenergan            No                       Notes: Do           [facility]

[facility]



               3-25                               not give           l



               06:03:                               IV push.                                            (Same as:           



                                                  Phenergan)           

 

     Saline            No                       Notes:           [facility]



     Flush 0.9%      3-25                               preservati           l



               06:03:                               ve free.                                                           

 

     butalbital-      2022- No             2{tbl}      2 tablet,         

  [facility]



     acetaminoph      3-18 03-18                          Oral,           ity of



     en-caff      03:45: 03:10                          ONCE, 1           [place]



     (ESGIC)      00   :00                           dose, On           [facility]



     -40                                         Thu            [facility]



     mg tablet 2                                         3/17/22 at           



     tablet                                         2245, ASAP           

 

     NaCl 0.9%      2022- No             500mL      at 999           [facility]

ers



     (NS) bolus      3-18 03-18                          mL/hr, 500           it

y of



     infusion      02:30: 03:17                          mL, IV           [place]



     500 mL      00   :00                           Infusion,           [facility]



                                                  ONCE, 1           [facility]



                                                  dose, On           



                                                  Thu            



                                                  3/17/22 at           



                                                  2130, STAT           

 

     diphenhydrA      2022- No             25mg      25 mg,           [facility]

vers



     MINE      3-18 03-18                          Slow IV           [facility] of



     (BENADRYL)      02:30: 01:46                          Push,           [place]



     injection      00   :00                           ONCE, 1           [facility]



     25 mg                                         dose, On           [facility]



                                                  Thu            



                                                  3/17/22 at           



                                                  2130, STAT           

 

     magnesium      2022- No             2g        2 g, IV           [facility]

ers



     sulfate in      3-18 03-18                          Piggyback,           it

y of



     water 2      02:15: 03:17                          Administer           [facility]

as



     gram/50 mL      00   :00                           over 30           [facility]

l



     (4 %)                                         Minutes,           [facility]



     infusion 2                                         ONCE, 1           



     g                                            dose, On           



                                                  Thu            



                                                  3/17/22 at           



                                                  2115,           



                                                  Routine           

 

     HYDROmorpho      2022- No             .5mg      0.5 mg,           Un

yoselyn



     ne                                  Slow IV           ity of



     (DILAUDID)      01:30: 01:25                          Push,           [place]



     injection      00   :00                           ONCE, 1           [facility]



     0.5 mg                                         dose, On           [facility]



                                                  Tue            



                                                  22 at           



                                                  1930,           



                                                  Routine<br           



                                                  >Use           



                                                  approved           



                                                  by             



                                                  (Faculty):           



                                                  [facility]            



                                                  PROVIDER           

 

     levoFLOXaci      2022- No        [id number] 750mg      Take 1        

   [facility]



     n 750 mg                                tablet by           ity o

f



     tablet      00:00: 05:59                          mouth           [place]



               00   :00                           daily for           [facility]



                                                  4 days.           [facility]

 

     levoFLOXaci      2022- No        [id number] 750mg      Take 1        

   [facility]



     n 750 mg                                tablet by           ity o

f



     tablet      00:00: 05:59                          mouth           [place]



               00   :00                           daily for           [facility]



                                                  4 days.           [facility]

 

     OXYCODONE            Yes                      Take by           Un[facility]

rs



     HCL/ACETAMI      -                               mouth.           ity of



     NOPHEN      19:49:                                              [place]



     (PERCOCET      27                                                Medical



     ORAL)                                                        [place]

 

     OXYCODONE            Yes                      Take by           Unive

rs



     HCL/ACETAMI      -25                               mouth.           ity of



     NOPHEN      19:49:                                              [place]



     (PERCOCET      27                                                Medical



     ORAL)                                                        [place]

 

     OXYCODONE            Yes                      Take by           Unive

rs



     HCL/ACETAMI      -25                               mouth.           ity of



     NOPHEN      19:49:                                              [place]



     (PERCOCET      27                                                Medical



     ORAL)                                                        [facility]

 

     OXYCODONE            Yes                      Take by           Unive

rs



     HCL/ACETAMI      -25                               mouth.           ity of



     NOPHEN      19:49:                                              [place]



     (PERCOCET      27                                                Medical



     ORAL)                                                        Branch

 

     vancomycin      2022- No             125mg      125 mg,           Un

[facility]



     (VANCOCIN)      1-25 01-31                          Oral, QID,           it

y of



     capsule 125      18:00: 21:59                          25 doses,           

[place]



     mg        00   :00                           First dose           Medical



                                                  (after           Branch



                                                  last           



                                                  modificati           



                                                  on) on 22 at           



                                                  1200, Last           



                                                  dose on           



                                                  22 at           



                                                  1200,           



                                                  ASAP<br>Fa           



                                                  culty           



                                                  member           



                                                  approving           



                                                  Non-formul           



                                                  maryanne            



                                                  medication           



                                                  :              



                                                  MEGADC<br&           



                                                  gt;Reason           



                                                  for            



                                                  non-formul           



                                                  maryanne use:           



                                                  SPECIFIC           



                                                  INDICATION           



                                                  FOR            



                                                  NONFORMULA           



                                                  RY             



                                                  PRODUCT<br           



                                                  >Reason           



                                                  for            



                                                  Anti-Infec           



                                                  tive:           



                                                  Documented           



                                                  Infection<           



                                                  br>Documen           



                                                  huma            



                                                  Infection           



                                                  Site:           



                                                  Abdominal<           



                                                  br>Duratio           



                                                  n of           



                                                  Therapy:           



                                                  14 days           

 

     levoFLOXaci            Yes            750mg      750 mg,           Un

[place]



     n         1-25                               Oral,           ity of



     (LEVAQUIN)      15:00:                               DAILY,           [place]



     tablet 750      00                                 First dose           Med

ical



     mg                                           (after           Branch



                                                  last           



                                                  modificati           



                                                  on) on 22 at           



                                                  0900,           



                                                  Until           



                                                  Discontinu           



                                                  ed,            



                                                  ASAP<br>Re           



                                                  ason for           



                                                  Anti-Infec           



                                                  tive:           



                                                  Empiric           



                                                  Therapy           



                                                  for            



                                                  Suspected           



                                                  Infection<           



                                                  br>Empiric           



                                                  Therapy           



                                                  Site:           



                                                  Urine<br>D           



                                                  uration of           



                                                  therapy:           



                                                  72 hours           

 

     lactobacill      2022- No        [id number] .5mg      Take 1         

  [facility]



     us        -25 02-25                          tablet by           ity of



     acidophilus      00:00: 05:59                          mouth 2           [facility]

[place]



               00   :00                           (two)           Medical



                                                  times           [facility]



                                                  daily for           



                                                  30 days.           

 

     lactobacill      2022- No        [id number] .5mg      Take 1         

  [facility]



     us        -25 02-25                          tablet by           ity of



     acidophilus      00:00: 05:59                          mouth 2           [facility]

[place]



               00   :00                           (two)           Medical



                                                  times           [facility]



                                                  daily for           



                                                  30 days.           

 

     vancomycin      2022- No        [id number] 125mg      Take 1         

  [facility]



     125 mg                                capsule by           ity of



     capsule      00:00: 05:59                          mouth 4           [place]



               00   :00                           (four)           Medical



                                                  times           Branch



                                                  daily for           



                                                  5 days.           

 

     vancomycin      2022- No        [id number] 125mg      Take 1         

  [facility]



     125 mg                                capsule by           ity of



     capsule      00:00: 05:59                          mouth 4           [place]



               00   :00                           (four)           Medical



                                                  times           Branch



                                                  daily for           



                                                  5 days.           

 

     traMADoL            Yes            50mg      50 mg,           [facility]



     (ULTRAM)      1-24                               Oral,           ity of



     tablet 50      21:26:                               Q6HPRN,           [place]



     mg        10                                 Starting           Medical



                                                  on Mon           Branch



                                                  22 at           



                                                  1526,           



                                                  Until           



                                                  Discontinu           



                                                  ed,            



                                                  Routine,           



                                                  Pain           



                                                  (scale           



                                                  4-6)           

 

     levoFLOXaci      2022- No             250mg      250 mg,           [facility]

[facility]



     n                                   Oral, Q24H           ity of



     (LEVAQUIN)      19:30: 23:18                          ABX, First           

[place]



     tablet 250      00   :17                           dose           Medical



     mg                                           (after           Branch



                                                  last           



                                                  modificati           



                                                  on) on Mon           



                                                  22 at           



                                                  1330,           



                                                  Until           



                                                  Discontinu           



                                                  ed,            



                                                  ASAP<br>Re           



                                                  ason for           



                                                  Anti-Infec           



                                                  tive:           



                                                  Empiric           



                                                  Therapy           



                                                  for            



                                                  Suspected           



                                                  Infection<           



                                                  br>Empiric           



                                                  Therapy           



                                                  Site:           



                                                  Urine<br>D           



                                                  uration of           



                                                  therapy:           



                                                  72 hours           

 

     HYDROmorpho      2022- No             .5mg      0.5 mg,           Un

[place]



     ne                                  Slow IV           ity of



     (DILAUDID)      20:45: 18:23                          Push,           [place]



     injection      00   :44                           Q6HPRN,           Medical



     0.5 mg                                         Starting           Branch



                                                  on Sun           



                                                  22 at           



                                                  1445,           



                                                  Until Mon           



                                                  22 at           



                                                  1223,           



                                                  Routine,           



                                                  Pain           



                                                  (scale           



                                                  7-10),           



                                                  breakthrou           



                                                  gh             



                                                  pain<br>Us           



                                                  e approved           



                                                  by             



                                                  (Faculty):           



                                                  ADC            



                                                  PROVIDER           

 

     oxyCODONE-a            Yes            2{tbl}      2 tablet,          

 [facility]



     cetaminophe                                     Oral,           ity of



     n         20:39:                               Q6HPRN,           [place]



     (PERCOCET)      03                                 Starting           Medic

al



     5-325 mg                                         on Sun           Branch



     per tablet                                         22 at           



     2 tablet                                         1439,           



                                                  Until           



                                                  Discontinu           



                                                  ed,            



                                                  Routine,           



                                                  Pain           



                                                  (scale           



                                                  7-10)           

 

     HYDROmorpho      2022- No             .5mg      0.5 mg,           Un

[place]



     ne                                  Slow IV           ity of



     (DILAUDID)      00:00: 23:41                          Push,           [place]



     injection      00   :00                           ONCE, 1           Medical



     0.5 mg                                         dose, On           Branch



                                                  Sat            



                                                  22 at           



                                                  1800,           



                                                  Routine<br           



                                                  >Use           



                                                  approved           



                                                  by             



                                                  (Faculty):           



                                                  ADC            



                                                  PROVIDER           

 

     ertapenem      2022- No             1000mg      1,000 mg,           

[facility]



     (INVANZ)                                IV             ity of



     1,000 mg in      15:45: 18:30                          Piggyback,          

 [place]



     NaCl 0.9%      00   :00                           Q24H ABX,           Medic

al



     (NS) 50 mL                                         First dose           Bra

[facility]



     MINI-BAG                                         on Sat           



                                                  22 at           



                                                  0945,           



                                                  Until           



                                                  Discontinu           



                                                  ed,            



                                                  Administer           



                                                  over 30           



                                                  Minutes,           



                                                  50             



                                                  mL<br>Reas           



                                                  on for           



                                                  Anti-Infec           



                                                  tive:           



                                                  Empiric           



                                                  Therapy           



                                                  for            



                                                  Suspected           



                                                  Infection<           



                                                  br>Empiric           



                                                  Therapy           



                                                  Site:           



                                                  Urine<br>D           



                                                  uration of           



                                                  therapy:           



                                                  72             



                                                  hours<br>R           



                                                  estricted           



                                                  use            



                                                  approved           



                                                  by: ADC           



                                                  PROVIDER           

 

     lactobacill            Yes            .5mg      0.5 mg,           Uni

vers



     us                                       Oral, BID,           ity of



     acidophilus      02:00:                               First dose           

[place]



     tablet 0.5      00                                 on Fri           Medical



     mg                                           22 at           Branch



                                                  ,           



                                                  Until           



                                                  Discontinu           



                                                  ed,            



                                                  Routine           

 

     vancomycin      2022- No             250mg      250 mg,           Un

[place]



     (VANCOCIN)                                Oral, QID,           it

y of



     capsule 250      02:00: 16:20                          First dose          

 [place]



     mg        00   :40                           (after           Medical



                                                  last           Branch



                                                  reorder)           



                                                  on Fri           



                                                  22 at           



                                                  2000,           



                                                  Until           



                                                  Discontinu           



                                                  ed,            



                                                  ASAP&lt;br           



                                                  >Faculty           



                                                  member           



                                                  approving           



                                                  Non-formul           



                                                  maryanne            



                                                  medication           



                                                  :              



                                                  MEGADC<br>           



                                                  Reason for           



                                                  non-formul           



                                                  maryanne use:           



                                                  SPECIFIC           



                                                  INDICATION           



                                                  FOR            



                                                  NONFORMULA           



                                                  RY             



                                                  PRODUCT<br           



                                                  >Reason           



                                                  for            



                                                  Anti-Infec           



                                                  tive:           



                                                  Documented           



                                                  Infection<           



                                                  br>Documen           



                                                  huma            



                                                  Infection           



                                                  Site:           



                                                  Abdominal<           



                                                  br>Duratio           



                                                  n of           



                                                  Therapy:           



                                                  14 days           

 

     vancomycin      2022- No             250mg      250 mg,           Un

[place]



     (VANCOCIN)                                Oral,           ity of



     capsule 250      00:45: 00:34                          ONCE, 1           [facility]

[place]



     mg        00   :00                           dose, On           Medical



                                                  Fri            Branch



                                                  22 at           



                                                  1845,           



                                                  ASAP<br>Fa           



                                                  culty           



                                                  member           



                                                  approving           



                                                  Non-formul           



                                                  maryanne            



                                                  medication           



                                                  :              



                                                  MEGADC<br>           



                                                  Reason for           



                                                  non-formul           



                                                  maryanne use:           



                                                  SPECIFIC           



                                                  INDICATION           



                                                  FOR            



                                                  NONFORMULA           



                                                  RY             



                                                  PRODUCT<br           



                                                  >Reason           



                                                  for            



                                                  Anti-Infec           



                                                  tive:           



                                                  Documented           



                                                  Infection<           



                                                  br>Documen           



                                                  huma            



                                                  Infection           



                                                  Site:           



                                                  Abdominal<           



                                                  br>Duratio           



                                                  n of           



                                                  Therapy:           



                                                  14 days           

 

     lidocaine      2022- No             5mL       5 mL,           Univer

s



     1% (PF)                                Subcutaneo           ity o

f



     (XYLOCAINE)      23:45: 02:25                          us, ONCE,           

[place]



     injection 5      00   :00                           1 dose, On           Me

dical



     mL                                           Fri            Branch



                                                  22 at           



                                                  1745,           



                                                  Routine           

 

     NaCl 0.9%            Yes            10mL      10 mL,           Univer

s



     (NS)                                     Slow IV           ity of



     injection      23:38:                               Push, PRN,           [facility]

[place]



     10 mL      41                                 Starting           Medical



                                                  on Fri           Branch



                                                  22 at           



                                                  1738,           



                                                  Until           



                                                  Discontinu           



                                                  ed,            



                                                  Routine,           



                                                  line           



                                                  maintenanc           



                                                  e              

 

     meropenem      2022- No             1g        1 g, IV           Univ

ers



     (MERREM)                           Piggyback,           it

y of



     g in NaCl      23:15: 14:33                          Q8H ABX,           Eric

as



     0.9% (NS)      00   :35                           First dose           Medi

sarah



     100 mL                                         (after           Branch



     MINI-BAG                                         last           



                                                  modificati           



                                                  on) on u           



                                                  22 at           



                                                  1715,           



                                                  Until           



                                                  Discontinu           



                                                  ed,            



                                                  Administer           



                                                  over 60           



                                                  Minutes,           



                                                  100            



                                                  mL<br>Rest           



                                                  ricted use           



                                                  approved           



                                                  by: ADC           



                                                  PROVIDER<b           



                                                  r&gt;Reaso           



                                                  n for           



                                                  Anti-Infec           



                                                  tive:           



                                                  Documented           



                                                  Infection<           



                                                  br>Documen           



                                                  huma            



                                                  Infection           



                                                  Site:           



                                                  Urine<br>D           



                                                  uration of           



                                                  Therapy: 7           



                                                  days           

 

     enoxaparin            Yes            40mg      40 mg,           Unive

rs



     (LOVENOX)                                     Subcutaneo           ity 

of



     injection      23:00:                               us, DAILY,           [facility]

[place]



     40 mg      00                                 First dose           Medical



                                                  on            Branch



                                                  22 at           



                                                  1700,           



                                                  Until           



                                                  Discontinu           



                                                  ed,            



                                                  Routine           

 

     meropenem      2022- No             1g        1 g, IV           Univ

ers



     (MERREM)                           Piggyback,           it

y of



     g in NaCl      16:00: 20:33                          Q8H ABX,           Eric

as



     0.9% (NS)      00   :04                           First dose           Medi

sarah



     100 mL                                         (after           Branch



     MINI-BAG                                         last           



                                                  reorder)           



                                                  on 22 at           



                                                  1000,           



                                                  Until           



                                                  Discontinu           



                                                  ed,            



                                                  Administer           



                                                  over 60           



                                                  Minutes,           



                                                  100            



                                                  mL<br>Rest           



                                                  ricted use           



                                                  approved           



                                                  by: [facility]           



                                                  PROVIDER<b           



                                                  r>Reason           



                                                  for            



                                                  Anti-Infec           



                                                  tive:           



                                                  Documented           



                                                  Infection<           



                                                  br>Documen           



                                                  huma            



                                                  Infection           



                                                  Site:           



                                                  Urine<br>D           



                                                  uration of           



                                                  Therapy:           



                                                  Other (see           



                                                  Comments)           

 

     HYDROmorpho      2022- No             .5mg      0.5 mg,           Un

[facility]



     ne                                  Slow IV           ity of



     (DILAUDID)      14:29: 20:41                          Push,           [place]



     injection      58   :17                           Q4HPRN,           Medical



     0.5 mg                                         Starting           Branch



                                                  on Thu           



                                                  22 at           



                                                  0829,           



                                                  Until Sun           



                                                  22 at           



                                                  1441,           



                                                  Routine,           



                                                  Pain           



                                                  (scale           



                                                  7-10)<br>U           



                                                  se             



                                                  approved           



                                                  by             



                                                  (Faculty):           



                                                  ADC            



                                                  PROVIDER           

 

     proMETHazin            Yes            25mg      25 mg,           Univ

ers



     e                                        Oral,           ity of



     (PHENERGAN)      09:42:                               Q4HPRN,           Eric

as



     tablet 25      07                                 Starting           Medica

l



     mg                                           on Thu           Branch



                                                  22 at           



                                                  0342,           



                                                  Until           



                                                  Discontinu           



                                                  ed,            



                                                  Routine,           



                                                  Nausea and           



                                                  Vomiting           



                                                  (N/V)           

 

     HYDROmorpho      2022- No             .5mg      0.5 mg,           Un

[place]



     ne                                  Slow IV           ity of



     (DILAUDID)      09:41: 12:04                          Push,           [place]



     injection      36   :38                           Q4HPRN,           Medical



     0.5 mg                                         Starting           [facility]



                                                  on Thu           



                                                  22 at           



                                                  0341,           



                                                  Until u           



                                                  22 at           



                                                  0604,           



                                                  Routine,           



                                                  Pain           



                                                  (scale           



                                                  7-10)<br>U           



                                                  se             



                                                  approved           



                                                  by             



                                                  (Faculty):           



                                                  ADC            



                                                  PROVIDER           

 

     proCHLORper            Yes            10mg      10 mg,           Univ

ers



     azine                                     Slow IV           ity of



     (COMPAZINE)      09:07:                               Push,           [place]



     injection      27                                 Q6HPRN,           Medical



     10 mg                                         Starting           [facility]



                                                  on u           



                                                  22 at           



                                                  0307,           



                                                  Until           



                                                  Discontinu           



                                                  ed,            



                                                  Routine,           



                                                  Nausea and           



                                                  Vomiting           



                                                  (N/V)           

 

     acetaminoph            Yes            650mg      650 mg,           Un

[place]



     en                                       Oral,           ity of



     (TYLENOL)      09:07:                               Q6HPRN,           [place]



     tablet 650      10                                 Starting           Medic

al



     mg                                           on Thu           Branch



                                                  22 at           



                                                  0307,           



                                                  Until           



                                                  Discontinu           



                                                  ed,            



                                                  Routine,           



                                                  Pain           



                                                  (scale           



                                                  1-3)           

 

     meropenem       202- No             1g        1 g, IV           Univ

ers



     (MERREM) 1      1-20 01-20                          Piggyback,           it

y of



     g in NaCl      07:15: 08:49                          ONCE, 1           [place]

[place]



     0.9% (NS)      00   :00                           dose, On           Medica

l



     100 mL                                         [facility] [place]



     MINI-BAG                                         22 at           



                                                  0115,           



                                                  Administer           



                                                  over 60           



                                                  Minutes,           



                                                  100            



                                                  mL<br>Rest           



                                                  ricted use           



                                                  approved           



                                                  by: ADC           



                                                  PROVIDER<b           



                                                  r>Reason           



                                                  for            



                                                  Anti-Infec           



                                                  tive:           



                                                  Documented           



                                                  Infection<           



                                                  br>Documen           



                                                  huma            



                                                  Infection           



                                                  Site:           



                                                  Urine<br>D           



                                                  uration of           



                                                  Therapy:           



                                                  Other (see           



                                                  Comments)           

 

     cefdinir      2022- No             300mg      300 mg,           Univ

ers



     (OMNICEF)                                Oral,           ity of



     capsule 300      03:30: 02:55                          ONCE, 1           Te

[place]



     mg        00   :00                           dose, On           Medical



                                                  Mon            Branch



                                                  22 at           



                                                  2130,           



                                                  ASAP<br>Re           



                                                  ason for           



                                                  Anti-Infec           



                                                  tive:           



                                                  Documented           



                                                  Infection<           



                                                  br>Documen           



                                                  huma            



                                                  Infection           



                                                  Site:           



                                                  Urine<br>D           



                                                  uration of           



                                                  Therapy: 7           



                                                  days           

 

     FENTanyl PF      2022- No             50ug      50 mcg,           Un

[facility]



     (SUBLIMAZE                                Slow IV           ity o

f



     (PF))      01:15: 03:26                          Push,           [place]



     injection      00   :00                           ONCE, 1           Medical



     50 mcg                                         dose, On           Branch



                                                  Mon            



                                                  22 at           



                                                  1915, STAT           

 

     proMETHazin       No             25mg      25 mg, IV           

Univers



     e                                   Piggyback,           ity of



     (PHENERGAN)      01:15: 03:26                          ONCE, 1           Te

[place]



     25 mg in      00   :00                           dose, On           Medical



     NaCl 0.9%                                         [facility]



     (NS) 50 mL                                         22 at           



     piggyback                                         191, 50           



                                                  mL             

 

     cefdinir      -2022- No        [id number] 300mg      Take 1           U

nivers



     300 mg                                capsule by           ity of



     capsule      00:00: 05:59                          mouth           [place]



               00   :00                           every 12           Medical



                                                  (twelve)           Branch



                                                  hours for           



                                                  10 days.           

 

     cefdinir      -0 - No        [id number] 300mg      Take 1           U

nivers



     300 mg                                capsule by           ity of



     capsule      00:00: 00:00                          mouth           [place]



               00   :00                           every 12           Medical



                                                  (twelve)           Branch



                                                  hours for           



                                                  10 days.           

 

     FENTanyl PF      2- No             50ug      50 mcg,           Un

[facility]



     (SUBLIMAZE                                Slow IV           ity o

f



     (PF))      02:00: 01:19                          Push,           [place]



     injection      00   :00                           ONCE, 1           Medical



     50 mcg                                         dose, On           Branch



                                                  Sat            



                                                  1/15/22 at           



                                                  ,           



                                                  Routine           

 

     methocarbam      -0 - No             1000mg      1,000 mg,         

  [facility]



     oL                                  Oral,           ity of



     (ROBAXIN)      02:00: 01:19                          ONCE, 1           [place]

[place]



     tablet      00   :00                           dose, On           Medical



     1,000 mg                                         Sat            Branch



                                                  1/15/22 at           



                                                  2000, ASAP           

 

     proMETHazin      2022- No             12.5mg      12.5 mg,          

 [facility]



     e         1-15 01-15                          IV             ity of



     (PHENERGAN)      22:46: 23:00                          Piggyback,          

 [place]



     12.5 mg in      00   :00                           ONCE, 1           Medica

l



     NaCl 0.9%                                         dose, On           Branch



     (NS) 50 mL                                         Sat            



     piggyback                                         1/15/22 at           



                                                  1700, 50           



                                                  mL             

 

     FENTanyl PF      2022- No             50ug      50 mcg,           Un

[facility]



     (SUBLIMAZE      1-15 01-15                          Slow IV           ity o

f



     (PF))      22:45: 23:00                          Push,           [place]



     injection      00   :00                           ONCE, 1           Medical



     50 mcg                                         dose, On           Branch



                                                  Sat            



                                                  1/15/22 at           



                                                  1645,           



                                                  Routine           

 

     methocarbam      202-0      Yes       [id number] 1000mg      Take 2         

  [facility]



     oL 500 mg      1-15                               tablets by           ity 

of



     tablet      00:00:                               mouth 4           [place]



               00                                 (four)           Medical



                                                  times           Branch



                                                  daily as           



                                                  needed for           



                                                  Pain           



                                                  (scale           



                                                  1-3).           

 

     methocarbam      2022-0      Yes       [id number] 1000mg      Take 2         

  [facility]



     oL 500 mg      1-15                               tablets by           ity 

of



     tablet      00:00:                               mouth 4           [place]



               00                                 (four)           Medical



                                                  times           Branch



                                                  daily as           



                                                  needed for           



                                                  Pain           



                                                  (scale           



                                                  1-3).           

 

     methocarbam      2022-0      Yes       [id number] 1000mg      Take 2         

  [facility]



     oL 500 mg      1-15                               tablets by           [facility] 

of



     tablet      00:00:                               mouth 4           [place]



               00                                 (four)           Medical



                                                  times           [facility]



                                                  daily as           



                                                  needed for           



                                                  Pain           



                                                  (scale           



                                                  1-3).           

 

     methocarbam      2022-0      Yes       [id number] 1000mg      Take 2         

  [facility]



     oL 500 mg      1-15                               tablets by           [facility] 

of



     tablet      00:00:                               mouth 4           [place]



               00                                 (four)           Medical



                                                  times           [facility]



                                                  daily as           



                                                  needed for           



                                                  Pain           



                                                  (scale           



                                                  1-3).           

 

     methocarbam      0      Yes       [id number] 1000mg      Take 2         

  [facility]



     oL 500 mg      1-15                               tablets by           [facility] 

of



     tablet      00:00:                               mouth 4           [place]



               00                                 (four)           Medical



                                                  times           [facility]



                                                  daily as           



                                                  needed for           



                                                  Pain           



                                                  (scale           



                                                  1-3).           

 

     methocarbam      -0      Yes       [id number] 1000mg      Take 2         

  [facility]



     oL 500 mg      1-15                               tablets by           [facility] 

of



     tablet      00:00:                               mouth 4           [place]



               00                                 (four)           Medical



                                                  times           [facility]



                                                  daily as           



                                                  needed for           



                                                  Pain           



                                                  (scale           



                                                  1-3).           

 

     methocarbam      0 - No        [id number] 1000mg      Take 2        

   [facility]



     oL 500 mg      1-15 05-11                          tablets by           ity

 of



     tablet      00:00: 00:00                          mouth 4           [place]



               00   :00                           (four)           Medical



                                                  times           [facility]



                                                  daily as           



                                                  needed for           



                                                  Pain           



                                                  (scale           



                                                  1-3).           

 

     proMETHazin      2021 No             25mg      25 mg, IV           

[facility]



     e         -                          Piggyback,           [facility] of



     (PHENERGAN)      23:15: 22:20                          ONCE, 1           [facility]

[facility]



     25 mg in      00   :00                           dose, On           Medical



     NaCl 0.9%                                         Wed            [facility]



     (NS) 50 mL                                         21           



     piggyback                                         at 1715,           



                                                  50 mL           

 

     FENTanyl PF      2021 No             75ug      75 mcg,           [facility]

[facility]



     (SUBLIMAZE                                Slow IV           ity o

f



     (PF))      22:15: 22:20                          Push,           [place]



     injection      00   :00                           ONCE, 1           Medical



     75 mcg                                         dose, On           [facility]



                                                  Wed            



                                                  21           



                                                  at 1615,           



                                                  Routine           

 

     fidaxomicin      2021      Yes       [id number] 200mg      Take 1          

 [facility]



     200 mg      1-24                               tablet by           [facility] of



     tablet      00:00:                               mouth 2           [place]



               00                                 (two)           Medical



                                                  times           [facility]



                                                  daily.           

 

     proMETHazin      2021      Yes       [id number] 25mg      Take 1           

[facility]



     e 25 mg      1-24                               tablet by           ity of



     tablet      00:00:                               mouth           [place]



               00                                 every 6           Medical



                                                  (six)           [facility]



                                                  hours as           



                                                  needed for           



                                                  Nausea and           



                                                  Vomiting           



                                                  (N/V).           

 

     fidaxomicin      2021      Yes       [id number] 200mg      Take 1          

 [facility]



     200 mg      1-24                               tablet by           [facility] of



     tablet      00:00:                               mouth 2           [place]



               00                                 (two)           Medical



                                                  times           [facility]



                                                  daily.           

 

     proMETHazin      2021      Yes       [id number] 25mg      Take 1           

[facility]



     e 25 mg      1-24                               tablet by           ity of



     tablet      00:00:                               mouth           [place]



               00                                 every 6           Medical



                                                  (six)           [facility]



                                                  hours as           



                                                  needed for           



                                                  Nausea and           



                                                  Vomiting           



                                                  (N/V).           

 

     cholestyram      2021      Yes                                     [facility]

s



     ine 4 gram      1-24                                              [facility]



     packet      00:00:                                              [place]



               00                                                [facility]



                                                                 [facility]

 

     fidaxomicin      2021      Yes       [id number] 200mg      Take 1          

 [facility]



     200 mg      1-24                               tablet by           [facility] of



     tablet      00:00:                               mouth 2           [place]



               00                                 (two)           Medical



                                                  times           [facility]



                                                  daily.           

 

     proMETHazin      2021      Yes       [id number] 25mg      Take 1           

[facility]



     e 25 mg      1-24                               tablet by           [facility] of



     tablet      00:00:                               mouth           [place]



               00                                 every 6           Medical



                                                  (six)           [facility]



                                                  hours as           



                                                  needed for           



                                                  Nausea and           



                                                  Vomiting           



                                                  (N/V).           

 

     cholestyram      2021      Yes                                     [facility]

s



     ine 4 gram      1-24                                              ity of



     packet      00:00:                                              [place]



               00                                                Medical



                                                                 [facility]

 

     cholestyram      2021      Yes                                     Un[facility]

s



     ine 4 gram      1-24                                              ity of



     packet      00:00:                                              [place]



                                                               [facility]



                                                                 [facility]

 

     cholestyram      2021      Yes                                     Un[facility]

s



     ine 4 gram      1-24                                              ity of



     packet      00:00:                                              [place]



               00                                                Medical



                                                                 [facility]

 

     cholestyram      2021      Yes                                     Un[facility]

s



     ine 4 gram      1-24                                              ity of



     packet      00:00:                                              [place]



                                                               Medical



                                                                 [facility]

 

     cholestyram      2021      Yes                                     Un[facility]

s



     ine 4 gram      1-24                                              ity of



     packet      00:00:                                              [place]



               00                                                Medical



                                                                 [facility]

 

     fidaxomicin      2021      Yes       [id number] 200mg      Take 1          

 [facility]



     200 mg      1-24                               tablet by           [facility] of



     tablet      00:00:                               mouth 2           [place]



               00                                 (two)           Medical



                                                  times           [facility]



                                                  daily.           

 

     proMETHazin      2021      Yes       [id number] 25mg      Take 1           

[facility]



     e 25 mg      1-24                               tablet by           ity of



     tablet      00:00:                               mouth           [place]



               00                                 every 6           Medical



                                                  (six)           [facility]



                                                  hours as           



                                                  needed for           



                                                  Nausea and           



                                                  Vomiting           



                                                  (N/V).           

 

     cholestyram      2021- No                                      [facility]

rs



     ine 4 gram      1-24 05-11                                         ity of



     packet      00:00: 00:00                                         [place]



               00   :00                                          Medical



                                                                 [facility]

 

     fidaxomicin      2021- No        [id number] 200mg      Take 1         

  [facility]



     200 mg      1-24 01-25                          tablet by           ity of



     tablet      00:00: 00:00                          mouth 2           [place]



               00   :00                           (two)           Medical



                                                  times           [facility]



                                                  daily.           

 

     proMETHazin      2021 No        [id number] 25mg      Take 1          

 [facility]



     e 25 mg                                tablet by           ity of



     tablet      00:00: 00:00                          mouth           [place]



               00   :00                           every 6           Medical



                                                  (six)           [facility]



                                                  hours as           



                                                  needed for           



                                                  Nausea and           



                                                  Vomiting           



                                                  (N/V).           

 

     FENTanyl PF       No             50ug      50 mcg,           Un

[facility]



     (SUBLIMAZE      5-10 05-10                          Intravenou           it

y of



     (PF))      02:45: 01:34                          s, ONCE, 1           [place]



     injection      00   :00                           dose, Sun           Medic

al



     50 mcg                                         21 at           Branch



                                                  2145,           



                                                  Routine           

 

     cefTRIAXone       No             1000mg      1,000 mg,         

  [facility]



     (ROCEPHIN)      5-10 05-10                          IV             ity of



     1,000 mg in      02:30: 01:55                          Piggyback,          

 [place]



     NaCl 0.9%      00   :00                           ONCE, 1           Medical



     (NS) 50 mL                                         dose, [facility]



     MINI-BAG                                         21 at           



                                                  2130, 50           



                                                  mL<br>Reas           



                                                  on for           



                                                  Anti-Infec           



                                                  tive:           



                                                  Documented           



                                                  Infection<           



                                                  br>Documen           



                                                  huma            



                                                  Infection           



                                                  Site:           



                                                  Urine<br>D           



                                                  uration of           



                                                  Therapy: 7           



                                                  days           

 

     famotidine       No             20mg      20 mg,           Univ

ers



     (PEPCID      5-10 05-10                          Slow IV           ity of



     (PF))      01:00: 00:12                          Push,           [place]



     injection      00   :00                           ONCE, 1           Medical



     20 mg                                         dose, [facility]



                                                  21 at           



                                                  2000, ASAP           

 

     proMETHazin       No             25mg      25 mg, IV           

[facility]



     e         5-10 05-10                          Piggyback,           ity of



     (PHENERGAN)      00:45: 00:11                          ONCE, 1           [facility]

[facility]



     25 mg in      00   :00                           dose, Sun           Med[facility]

l



     NaCl 0.9%                                         21 at           [facility]

h



     (NS) 50 mL                                         1945, 50           



     piggyback                                         mL             

 

     FENTanyl PF      2021- No             50ug      50 mcg,           Un

[facility]



     (SUBLIMAZE      5-10 05-10                          Intravenou           it

y of



     (PF))      00:45: 00:12                          s, ONCE, 1           [place]



     injection      00   :00                           dose, Sun           Medic

al



     50 mcg                                         21 at           [facility]



                                                  1945,           



                                                  Routine           

 

     NaCl 0.9%                   1000mL      at 999           Uni

vers



     (NS) bolus      5-10 05-10                          mL/hr,           ity of



     infusion      00:00: 01:47                          1,000 mL,           [facility]

as



     1,000 mL      00   :00                           IV             Medical



                                                  Infusion,           [facility]



                                                  ONCE, 1           



                                                  dose, Sun           



                                                  21 at           



                                                  1900, ASAP           

 

     cefdinir       No        [id number] 300mg      Take 1           U

nivers



     300 mg       05-20                          capsule by           ity of



     capsule      00:00: 04:59                          mouth 2           [place]



               00   :00                           (two)           Medical



                                                  times           [place]



                                                  daily for           



                                                  10 days.           

 

     buPROPion                   150mg QD   Take 1           [facility]



     (WELLBUTRIN      -17 -16                          tablet           [facility]



     XL) 150 MG      00:00: 23:59                          (150 mg           Med

ical



     24 hr      00   :00                           total) by           Center



     tablet                                         mouth           



                                                  daily.           

 

     citalopram                   40mg QD   Take 1           [facility]



     (CELEXA) 40      -17 -16                          tablet (40           [facility]

[facility]



     MG tablet      00:00: 23:59                          mg total)           Me

dical



               00   :00                           by mouth           Center



                                                  daily.           

 

     oxyCODONE-a            Yes            1{tbl}      Take 1           [facility]



     cetaminophe      1-16                               tablet by           Ni

es



     n         14:44:                               mouth           Medical



     (PERCOCET)      50                                 every 4           Center



      mg                                         (four)           



     per tablet                                         hours as           



                                                  needed for           



                                                  Pain.           

 

     oxyCODONE-a            Yes            1{tbl}      Take 1           [facility]



     cetaminophe      1-16                               tablet by           Ni

es



     n         14:44:                               mouth           Medical



     (PERCOCET)      50                                 every 4           Center



      mg                                         (four)           



     per tablet                                         hours as           



                                                  needed for           



                                                  Pain.           

 

     oxyCODONE-a            Yes            1{tbl}      Take 1           [facility]



     cetaminophe      1-16                               tablet by           Ni

es



     n         14:44:                               mouth           Medical



     (PERCOCET)      50                                 every 4           Center



      mg                                         (four)           



     per tablet                                         hours as           



                                                  needed for           



                                                  Pain.           

 

     oxyCODONE-a            Yes            1{tbl}      Take 1           [facility]



     cetaminophe      1-16                               tablet by           Ni

es



     n         14:44:                               mouth           Medical



     (PERCOCET)      50                                 every 4           Center



      mg                                         (four)           



     per tablet                                         hours as           



                                                  needed for           



                                                  Pain.           

 

     oxyCODONE-a      2020-0      Yes            1{tbl}      Take 1           [facility]



     cetaminophe      1-16                               tablet by           [facility]





     n         14:44:                               mouth           Medical



     (PERCOCET)      50                                 every 4           Center



      mg                                         (four)           



     per tablet                                         hours as           



                                                  needed for           



                                                  Pain.           

 

     oxyCODONE-a      2020-0      Yes            1{tbl}      Take 1           [facility]



     cetaminophe      1-16                               tablet by           [facility]





     n         14:44:                               mouth           Medical



     (PERCOCET)      50                                 every 4           Center



      mg                                         (four)           



     per tablet                                         hours as           



                                                  needed for           



                                                  Pain.           

 

     oxyCODONE-a      2020-0      Yes            1{tbl}      Take 1           [facility]



     cetaminophe      1-16                               tablet by           Ni





     n         14:44:                               mouth           Medical



     (PERCOCET)      50                                 every 4           Center



      mg                                         (four)           



     per tablet                                         hours as           



                                                  needed for           



                                                  Pain.           

 

     zinc      2020-0      Yes            5g   Q.5D Apply 5 g           [facility]



     oxide-yuan      1-16                               topically           [facility]

[facility]



     latum      00:00:                               2 (two)           [facility]



     (CRITIC-AID      00                                 times           [facility]



     ) 20-51 %                                         daily.           



     Pste                                                        



     topical                                                        



     paste                                                        

 

     meropenem      2020-0      Yes            1g        Inject 1 g           [facility]



     ([facility]REM)      1-16                               intravenou           [facility]



     MBP 1 gm in      00:00:                               sly every           [facility]

[facility]



     100 mL NS      00                                 8 (eight)           [facility]

r



                                                  hours.           

 

     insulin      2020-0      Yes            58U  Q.5D Inject 58           [facility]

t



     glargine      1-16                               Units           [facility]



     (LANTUS)      00:00:                               subcutaneo           Med

ical



     100 unit/mL      00                                 usly 2           [facility]



     injection                                         (two)           



                                                  times           



                                                  daily Use           



                                                  as             



                                                  directed.           

 

     zinc      2020-0      Yes            5g   Q.5D Apply 5 g           [facility]



     oxide-yuan      1-16                               topically           [facility]

[facility]



     latum      00:00:                               2 (two)           [facility]



     (CRITIC-AID      00                                 times           [facility]



     ) 20-51 %                                         daily.           



     Pste                                                        



     topical                                                        



     paste                                                        

 

     meropenem      2020-0      Yes            1g        Inject 1 g           [facility]



     ([facility])      1-16                               intravenou           [facility]



     MBP 1 gm in      00:00:                               sly every           [facility]

[facility]



     100 mL NS      00                                 8 (eight)           [facility]

r



                                                  hours.           

 

     insulin      2020-0      Yes            58U  Q.5D Inject 58           [facility]

t



     glargine      1-16                               Units           [facility]



     (LANTUS)      00:00:                               subcutaneo           Med

ical



     100 unit/mL      00                                 usly 2           [facility]



     injection                                         (two)           



                                                  times           



                                                  daily Use           



                                                  as             



                                                  directed.           

 

     zinc      2020-0      Yes            5g   Q.5D Apply 5 g           [facility]



     oxide-yuan      1-16                               topically           [facility]

[facility]



     latum      00:00:                               2 (two)           [facility]



     (CRITIC-AID      00                                 times           [facility]



     ) 20-51 %                                         daily.           



     Pste                                                        



     topical                                                        



     paste                                                        

 

     meropenem      2020-0      Yes            1g        Inject 1 g           [facility]



     ([facility]REM)      1-16                               intravenou           [facility]



     MBP 1 gm in      00:00:                               sly every           [facility]

ed[facility]



     100 mL NS      00                                 8 (eight)           [facility]

r



                                                  hours.           

 

     insulin      2020-0      Yes            58U  Q.5D Inject 58           [facility]

t



     glargine      1-16                               Units           [facility]



     (LANTUS)      00:00:                               subcutaneo           Med

ical



     100 unit/mL      00                                 usly 2           [facility]



     injection                                         (two)           



                                                  times           



                                                  daily Use           



                                                  as             



                                                  directed.           

 

     zinc      2020-0      Yes            5g   Q.5D Apply 5 g           [facility]



     oxide-yuan      1-16                               topically           [facility]

[facility]



     latum      00:00:                               2 (two)           [facility]



     (CRITIC-AID      00                                 times           [facility]



     ) 20-51 %                                         daily.           



     Pste                                                        



     topical                                                        



     paste                                                        

 

     meropenem      2020-0      Yes            1g        Inject 1 g           [facility]



     ([facility]REM)      1-16                               intravenou           [facility]



     MBP 1 gm in      00:00:                               sly every           [facility]

[facility]



     100 mL NS      00                                 8 (eight)           [facility]

r



                                                  hours.           

 

     insulin      2020-0      Yes            58U  Q.5D Inject 58           [facility]

t



     glargine      1-16                               Units           [facility]



     (LANTUS)      00:00:                               subcutaneo           Med

ical



     100 unit/mL      00                                 usly 2           [facility]



     injection                                         (two)           



                                                  times           



                                                  daily Use           



                                                  as             



                                                  directed.           

 

     zinc      2020-0      Yes            5g   Q.5D Apply 5 g           [facility]



     oxide-yuan      1-16                               topically           [facility]

[facility]



     latum      00:00:                               2 (two)           [facility]



     (CRITIC-AID      00                                 times           [facility]



     ) 20-51 %                                         daily.           



     Pste                                                        



     topical                                                        



     paste                                                        

 

     meropenem      2020-0      Yes            1g        Inject 1 g           [facility]



     ([facility]REM)      1-16                               intravenou           [facility]



     MBP 1 gm in      00:00:                               sly every           [facility]

[facility]



     100 mL NS      00                                 8 (eight)           [facility]

r



                                                  hours.           

 

     insulin      2020-0      Yes            58U  Q.5D Inject 58           [facility]

t



     glargine      1-16                               Units           [facility]



     (LANTUS)      00:00:                               subcutaneo           Med

ical



     100 unit/mL      00                                 usly 2           [facility]



     injection                                         (two)           



                                                  times           



                                                  daily Use           



                                                  as             



                                                  directed.           

 

     zinc      2020-0      Yes            5g   Q.5D Apply 5 g           [facility]



     oxide-yuan      1-16                               topically           [facility]

[facility]



     latum      00:00:                               2 (two)           [facility]



     (CRITIC-AID      00                                 times           [facility]



     ) 20-51 %                                         daily.           



     Pste                                                        



     topical                                                        



     paste                                                        

 

     meropenem      2020-0      Yes            1g        Inject 1 g           [facility]



     ([facility]REM)      1-16                               intravenou           [facility]



     MBP 1 gm in      00:00:                               sly every           [facility]

[facility]



     100 mL NS      00                                 8 (eight)           [facility]

r



                                                  hours.           

 

     insulin      2020-0      Yes            58U  Q.5D Inject 58           [facility]

t



     glargine      1-16                               Units           [facility]



     (LANTUS)      00:00:                               subcutaneo           Med

ical



     100 unit/mL      00                                 usly 2           [facility]



     injection                                         (two)           



                                                  times           



                                                  daily Use           



                                                  as             



                                                  directed.           

 

     zinc      2020-0      Yes            5g   Q.5D Apply 5 g           [facility]



     oxide-yuan      1-16                               topically           [facility]

[facility]



     latum      00:00:                               2 (two)           [facility]



     (CRITIC-AID      00                                 times           [facility]



     ) 20-51 %                                         daily.           



     Pste                                                        



     topical                                                        



     paste                                                        

 

     meropenem      2020-0      Yes            1g        Inject 1 g           [facility]



     ([facility]REM)      1-16                               intravenou           [facility]



     MBP 1 gm in      00:00:                               sly every           [facility]

[facility]



     100 mL NS      00                                 8 (eight)           [facility]

r



                                                  hours.           

 

     insulin      2020-0      Yes            58U  Q.5D Inject 58           [facility]

t



     glargine      1-16                               Units           [facility]



     (LANTUS)      00:00:                               subcutaneo           Med

ical



     100 unit/mL      00                                 usly 2           [facility]



     injection                                         (two)           



                                                  times           



                                                  daily Use           



                                                  as             



                                                  directed.           

 

     insulin      2020-0 - No             0U        Inject           [facility]



     lispro      1-16 01-15                          0-12 Units           [facility]



     (HUMALOG)      00:00: 23:59                          subcutaneo           [facility]

[facility]



     100 unit/mL      00   :00                           usly 3           [facility]



     injection                                         (three)           



                                                  times           



                                                  daily           



                                                  before           



                                                  meals.           

 

     insulin      2020-0 - No             25U       Inject 25           [facility]



     lispro      1-16 01-15                          Units           [facility]



     (HUMALOG)      00:00: 23:59                          subcutaneo           [facility]

[facility]



     100 unit/mL      00   :00                           [facility] 3           Center



     injection                                         (three)           



                                                  times           



                                                  daily           



                                                  before           



                                                  meals.           

 

     normal      2018      No                       1,000 mL,           [facility]

[facility]



     saline 0.9%      1-08                               Rate: 100           l



     IV 1,000 mL      11:04:                               ml/hr,           [facility]

[facility]



               00                                 Infuse           



                                                  over: 10           



                                                  hr, Route:           



                                                  IV, Dosing           



                                                  Weight           



                                                  131.818           



                                                  kg, Total           



                                                  Volume:           



                                                  1,000,           



                                                  Start           



                                                  date:           



                                                  18           



                                                  5:04:00           



                                                  CST,           



                                                  Duration:           



                                                  30 day,           



                                                  Stop date:           



                                                  18           



                                                  5:03:00           



                                                  CST, 2.4,           



                                                  m2             

 

     normal      2018      No                       1,000 mL,           [facility]

[facility]



     saline 0.9%      1-08                               Rate: 100           l



     IV 1,000 mL      11:04:                               ml/hr,           [facility]

[facility]



               00                                 Infuse           



                                                  over: 10           



                                                  hr, Route:           



                                                  IV, Dosing           



                                                  Weight           



                                                  131.818           



                                                  kg, Total           



                                                  Volume:           



                                                  1,000,           



                                                  Start           



                                                  date:           



                                                  18           



                                                  5:04:00           



                                                  CST,           



                                                  Duration:           



                                                  30 day,           



                                                  Stop date:           



                                                  18           



                                                  5:03:00           



                                                  CST, 2.4,           



                                                  m2             

 

     normal      2018      No                       1,000 mL,           [facility]

[facility]



     saline 0.9%      1-08                               Rate: 100           l



     IV 1,000 mL      11:04:                               ml/hr,           [facility]

[facility]



               00                                 Infuse           



                                                  over: 10           



                                                  hr, Route:           



                                                  IV, Dosing           



                                                  Weight           



                                                  131.818           



                                                  kg, Total           



                                                  Volume:           



                                                  1,000,           



                                                  Start           



                                                  date:           



                                                  18           



                                                  5:04:00           



                                                  CST,           



                                                  Duration:           



                                                  30 day,           



                                                  Stop date:           



                                                  18           



                                                  5:03:00           



                                                  CST, 2.4,           



                                                  m2             

 

     normal      2018      No                       1,000 mL,           [facility]

[facility]



     saline 0.9%      1-08                               Rate: 100           l



     IV 1,000 mL      11:04:                               ml/hr,           [facility]

[facility]



               00                                 Infuse           



                                                  over: 10           



                                                  hr, Route:           



                                                  IV, Dosing           



                                                  Weight           



                                                  131.818           



                                                  kg, Total           



                                                  Volume:           



                                                  1,000,           



                                                  Start           



                                                  date:           



                                                  18           



                                                  5:04:00           



                                                  CST,           



                                                  Duration:           



                                                  30 day,           



                                                  Stop date:           



                                                  18           



                                                  5:03:00           



                                                  CST, 2.4,           



                                                  m2             

 

     normal      2018      No                       1,000 mL,           [facility]

[facility]



     saline 0.9%      1-08                               Rate: 100           l



     IV 1,000 mL      11:04:                               ml/hr,           [facility]

[facility]



               00                                 Infuse           



                                                  over: 10           



                                                  hr, Route:           



                                                  IV, Dosing           



                                                  Weight           



                                                  131.818           



                                                  kg, Total           



                                                  Volume:           



                                                  1,000,           



                                                  Start           



                                                  date:           



                                                  18           



                                                  5:04:00           



                                                  CST,           



                                                  Duration:           



                                                  30 day,           



                                                  Stop date:           



                                                  18           



                                                  5:03:00           



                                                  CST, 2.4,           



                                                  m2             

 

     normal      2018      No                       1,000 mL,           [facility]

a



     saline 0.9%      1-08                               Rate: 100           l



     IV 1,000 mL      11:04:                               ml/hr,           [facility]

[facility]



               00                                 Infuse           



                                                  over: 10           



                                                  hr, Route:           



                                                  IV, Dosing           



                                                  Weight           



                                                  131.818           



                                                  kg, Total           



                                                  Volume:           



                                                  1,000,           



                                                  Start           



                                                  date:           



                                                  18           



                                                  5:04:00           



                                                  CST,           



                                                  Duration:           



                                                  30 day,           



                                                  Stop date:           



                                                  18           



                                                  5:03:00           



                                                  CST, 2.4,           



                                                  m2             

 

     normal      2018      No                       1,000 mL,           [facility]

a



     saline 0.9%      1-08                               Rate: 100           l



     IV 1,000 mL      11:04:                               ml/hr,           [facility]

[place]



               00                                 Infuse           



                                                  over: 10           



                                                  hr, Route:           



                                                  IV, Dosing           



                                                  Weight           



                                                  131.818           



                                                  kg, Total           



                                                  Volume:           



                                                  1,000,           



                                                  Start           



                                                  date:           



                                                  18           



                                                  5:04:00           



                                                  CST,           



                                                  Duration:           



                                                  30 day,           



                                                  Stop date:           



                                                  18           



                                                  5:03:00           



                                                  CST, 2.4,           



                                                  m2             

 

     Magnesium      2018      No                       Notes:           [facility]

a



     Sulfate      1-08                               WASTE: F/P           l



               10:36:                               - Sink; E           [facility]



                                                -              



                                                  Municipal           



                                                  Trash Bin           

 

     Isolyte S      2018      No                       Notes:           [facility]

a



     PH-7.4      1-08                               (Same as:           l



     (Bolus) IV      10:36:                               Isolyte S           He

rmann



               00                                 PH 7.4)           

 

     Magnesium      2018      No                       Notes:           [facility]

a



     Sulfate      1-08                               WASTE: F/P           l



               10:36:                               - Sink; E           [facility]



                                                -              



                                                  Municipal           



                                                  Trash Bin           

 

     Isolyte S      2018      No                       Notes:           [facility]

a



     PH-7.4      1-08                               (Same as:           l



     (Bolus) IV      10:36:                               Isolyte S           He

rmann



               00                                 PH 7.4)           

 

     Magnesium      2018      No                       Notes:           [facility]

a



     Sulfate      1-08                               WASTE: F/P           l



               10:36:                               - Sink; E           [facility]



               00                                 -              



                                                  Municipal           



                                                  Trash Bin           

 

     Isolyte S      2018      No                       Notes:           [facility]

a



     PH-7.4      1-08                               (Same as:           l



     (Bolus) IV      10:36:                               Isolyte S           He

rmann



               00                                 PH 7.4)           

 

     Magnesium      2018      No                       Notes:           [facility]

a



     Sulfate      1-08                               WASTE: F/P           l



               10:36:                               - Sink; E           [facility]



                                                -              



                                                  Municipal           



                                                  Trash Bin           

 

     Isolyte S      2018      No                       Notes:           [facility]

a



     PH-7.4      1-08                               (Same as:           l



     (Bolus) IV      10:36:                               Isolyte S           He

rmann



               00                                 PH 7.4)           

 

     Magnesium      2018      No                       Notes:           [facility]

a



     Sulfate      1-08                               WASTE: F/P           l



               10:36:                               - Sink; E           [facility]



                                                -              



                                                  Municipal           



                                                  Trash Bin           

 

     Isolyte S      2018      No                       Notes:           [facility]

a



     PH-7.4      1-08                               (Same as:           l



     (Bolus) IV      10:36:                               Isolyte S           He

rmann



               00                                 PH 7.4)           

 

     Magnesium      2018      No                       Notes:           [facility]

a



     Sulfate      1-08                               WASTE: F/P           l



               10:36:                               - Sink; E           [facility]



                                                -              



                                                  Municipal           



                                                  Trash Bin           

 

     Isolyte S      2018      No                       Notes:           [facility]

a



     PH-7.4      1-08                               (Same as:           l



     (Bolus) IV      10:36:                               Isolyte S           He

rmann



               00                                 PH 7.4)           

 

     Magnesium      2018      No                       Notes:           [facility]

a



     Sulfate      1-08                               WASTE: F/P           l



               10:36:                               - Sink; E           [facility]



                                                -              



                                                  Municipal           



                                                  Trash Bin           

 

     Isolyte S      2018      No                       Notes:           [facility]

a



     PH-7.4      1-08                               (Same as:           l



     (Bolus) IV      10:36:                               Isolyte S           He

rmann



               00                                 PH 7.4)           

 

     Phenergan      2018      No                       12.5 mg,           [facility]

rajeev



               1-08                               0.5 mL,           l



               10:35:                               Route:           [facility]



               00                                 IVPB, Drug           



                                                  form: INJ,           



                                                  ONCE,           



                                                  Dosing           



                                                  Weight           



                                                  131.818,           



                                                  kg,            



                                                  Priority:           



                                                  STAT,           



                                                  Start           



                                                  date:           



                                                  18           



                                                  4:35:00           



                                                  CST, Stop           



                                                  date:           



                                                  18           



                                                  4:35:00           



                                                  CST            

 

     Phenergan      2018      No                       12.5 mg,           [facility]

rajeev



               1-08                               0.5 mL,           l



               10:35:                               Route:           [facility]



               00                                 IVPB, Drug           



                                                  form: INJ,           



                                                  ONCE,           



                                                  Dosing           



                                                  Weight           



                                                  131.818,           



                                                  kg,            



                                                  Priority:           



                                                  STAT,           



                                                  Start           



                                                  date:           



                                                  18           



                                                  4:35:00           



                                                  CST, Stop           



                                                  date:           



                                                  18           



                                                  4:35:00           



                                                  CST            

 

     Phenergan      2018-1      No                       12.5 mg,           [facility]

rajeev



               1-08                               0.5 mL,           l



               10:35:                               Route:           [facility]



               00                                 IVPB, Drug           



                                                  form: INJ,           



                                                  ONCE,           



                                                  Dosing           



                                                  Weight           



                                                  131.818,           



                                                  kg,            



                                                  Priority:           



                                                  STAT,           



                                                  Start           



                                                  date:           



                                                  18           



                                                  4:35:00           



                                                  CST, Stop           



                                                  date:           



                                                  18           



                                                  4:35:00           



                                                  CST            

 

     Phenergan      2018-1      No                       12.5 mg,           [facility]

rajeev



               1-08                               0.5 mL,           l



               10:35:                               Route:           [facility]



               00                                 IVPB, Drug           



                                                  form: INJ,           



                                                  ONCE,           



                                                  Dosing           



                                                  Weight           



                                                  131.818,           



                                                  kg,            



                                                  Priority:           



                                                  STAT,           



                                                  Start           



                                                  date:           



                                                  18           



                                                  4:35:00           



                                                  CST, Stop           



                                                  date:           



                                                  18           



                                                  4:35:00           



                                                  CST            

 

     Phenergan      2018-1      No                       12.5 mg,           [facility]

rajeev



               1-08                               0.5 mL,           l



               10:35:                               Route:           [facility]



               00                                 IVPB, Drug           



                                                  form: INJ,           



                                                  ONCE,           



                                                  Dosing           



                                                  Weight           



                                                  131.818,           



                                                  kg,            



                                                  Priority:           



                                                  STAT,           



                                                  Start           



                                                  date:           



                                                  18           



                                                  4:35:00           



                                                  CST, Stop           



                                                  date:           



                                                  18           



                                                  4:35:00           



                                                  CST            

 

     Phenergan      2018-      No                       12.5 mg,           [facility]

rajeev



               1-08                               0.5 mL,           l



               10:35:                               Route:           [facility]



               00                                 IVPB, Drug           



                                                  form: INJ,           



                                                  ONCE,           



                                                  Dosing           



                                                  Weight           



                                                  131.818,           



                                                  kg,            



                                                  Priority:           



                                                  STAT,           



                                                  Start           



                                                  date:           



                                                  18           



                                                  4:35:00           



                                                  CST, Stop           



                                                  date:           



                                                  18           



                                                  4:35:00           



                                                  CST            

 

     Phenergan      2018-1      No                       12.5 mg,           [facility]

rajeev



               1-08                               0.5 mL,           l



               10:35:                               Route:           [facility]



               00                                 IVPB, Drug           



                                                  form: INJ,           



                                                  ONCE,           



                                                  Dosing           



                                                  Weight           



                                                  131.818,           



                                                  kg,            



                                                  Priority:           



                                                  STAT,           



                                                  Start           



                                                  date:           



                                                  18           



                                                  4:35:00           



                                                  CST, Stop           



                                                  date:           



                                                  18           



                                                  4:35:00           



                                                  CST            

 

     Wellbutrin Wellbutrin 2018      No             1{table BID  Wellbutrin   

        



      MG  MG 0-04                     t_in_th       MG        

   



               00:00:                     e_morni                     



               00                       ng}                      

 

     Wellbutrin Wellbutrin 20181      No             1{table BID  Wellbutrin   

        



      MG  MG 0-04                     t_in_th       MG        

   



               00:00:                     e_morni                     



               00                       ng}                      

 

     Wellbutrin Wellbutrin -      No             1{table BID               

  



      MG  MG 0-04                     t_in_th                     



               00:00:                     e_morni                     



               00                       ng}                      

 

     Wellbutrin Wellbutrin -      No             1{table BID  Wellbutrin   

        



      MG  MG 0-04                     t_in_th       MG        

   



               00:00:                     e_morni                     



               00                       ng}                      

 

     Wellbutrin Wellbutrin 2018      No             1{table BID  Wellbutrin   

        



      MG  MG 0-04                     t_in_th       MG        

   



               00:00:                     e_morni                     



               00                       ng}                      

 

     Wellbutrin Wellbutrin 2018      No             1{table BID  Wellbutrin   

        



      MG  MG 0-04                     t_in_th       MG        

   



               00:00:                     e_morni                     



               00                       ng}                      

 

     Wellbutrin Wellbutrin 2018      No             1{table BID  Wellbutrin   

        



      MG  MG 0-04                     t_in_th       MG        

   



               00:00:                     e_morni                     



               00                       ng}                      

 

     Citalopram Citalopram       Yes  [place]                1 tablet     

      Common



     Hydrobromid Hydrobromid 7-16                                              S

pirit



     e    e    00:00:                                              - [facility]



                                                               [facility]

 

     Seroquel Seroquel       Yes  [place]                1 tablet         

  Common



               7-16                                              [facility]



               00:00:                                               [facility]



                                                               [facility]

 

     Docusate            Yes                      100 mg = 1           [facility]

[facility]



     Sodium 100      5-08                               cap, PO,           l



     MG Oral      14:56:                               BID, 0           [facility]



     Capsule      00                                 Refill(s)           

 

     Zosyn            Yes                      0              [facility]



               5-08                               Refill(s)           l



               14:56:                                              [facility]



               00                                                

 

     celecoxib            Yes                      200 mg = 1           Me

moria



     200 mg oral      5-08                               cap, PO,           l



     capsule      14:56:                               BID, 0           [facility]



               00                                 Refill(s)           

 

     ascorbic            Yes                      500 mg = 1           [facility]

[facility]



     acid      5-08                               tab, PO,           l



               14:56:                               BID, 0           [facility]



               00                                 Refill(s)           

 

     acetaminoph            Yes                      1,000 mg =           

[facility]



     en 500 mg      5-08                               2 tab, PO,           l



     oral tablet      14:56:                               Q6Hnow, 0           H

ermann



               00                                 Refill(s)           

 

     Oxycodone            Yes                      5 mg = 1           [facility]

rajeev



     Hydrochlori      5-08                               tab, PO,           l



     de 5 MG      14:56:                               Q4H, PRN           Miguel

n



     Oral Tablet      00                                 Pain Score           



                                                  4-6, 0           



                                                  Refill(s)           

 

     zinc            Yes                      220 mg = 1           [facility]



     sulfate 220      5-08                               cap, PO,           l



     mg oral      14:56:                               Daily, 0           [facility]

n



     capsule      00                                 Refill(s)           

 

     multivitami            Yes                      1 tab, PO,           

[facility]



     n         5-08                               Daily, 0           l



               14:56:                               Refill(s)           [facility]



               00                                                

 

     methocarbam            Yes                      1,000 mg =           

[facility]



     ol 500 mg      5-08                               2 tab, PO,           l



     oral tablet      14:56:                               Q8H, 0           [facility]

[facility]en



               00                                 Refill(s)           

 

     LORazepam            Yes                      0.5 mg = 1           Me

moria



     0.5 mg oral      5-08                               tab, PO,           l



     tablet      14:56:                               Q8H, PRN           Jose



               00                                 Anxiety, 0           



                                                  Refill(s)           

 

     Lidocaine            Yes                      3 patch,           [facility]

rajeev



     Hydrochlori      5-08                               TOP,           l



     de 0.05      14:56:                               Daily,           Jose



     MG/MG      00                                 Remove           



     Transdermal                                         after 12           



     Patch                                         hours, 0           



     [Lidoderm]                                         Refill(s)           

 

     Docusate            Yes                      100 mg = 1           [facility]

[facility]



     Sodium 100      5-08                               cap, PO,           l



     MG Oral      14:56:                               BID, 0           [facility]



     Capsule      00                                 Refill(s)           

 

     Zosyn            Yes                      0              [facility]



               5-08                               Refill(s)           l



               14:56:                                              [facility]



               00                                                

 

     celecoxib            Yes                      200 mg = 1           [facility]

[facility]



     200 mg oral      5-08                               cap, PO,           l



     capsule      14:56:                               BID, 0           [facility]



               00                                 Refill(s)           

 

     ascorbic            Yes                      500 mg = 1           [facility]

[facility]



     acid      5-08                               tab, PO,           l



               14:56:                               BID, 0           [facility]



               00                                 Refill(s)           

 

     acetaminoph            Yes                      1,000 mg =           

[facility]



     en 500 mg      5-08                               2 tab, PO,           l



     oral tablet      14:56:                               Q6Hnow, 0           H

ermann



               00                                 Refill(s)           

 

     Oxycodone            Yes                      5 mg = 1           [facility]

rajeev



     Hydrochlori      5-08                               tab, PO,           l



     de 5 MG      14:56:                               Q4H, PRN           [facility]

n



     Oral Tablet      00                                 Pain Score           



                                                  4-6, 0           



                                                  Refill(s)           

 

     zinc            Yes                      220 mg = 1           [facility]



     sulfate 220      5-08                               cap, PO,           l



     mg oral      14:56:                               Daily, 0           [facility]

n



     capsule      00                                 Refill(s)           

 

     multivitami      0      Yes                      1 tab, PO,           

[facility]



     n         5-08                               Daily, 0           l



               14:56:                               Refill(s)           [facility]



               00                                                

 

     methocarbam            Yes                      1,000 mg =           

[facility]



     ol 500 mg      5-08                               2 tab, PO,           l



     oral tablet      14:56:                               Q8H, 0           [facility]

nguy[place]



               00                                 Refill(s)           

 

     LORazepam            Yes                      0.5 mg = 1           [facility]

[facility]



     0.5 mg oral      5-08                               tab, PO,           l



     tablet      14:56:                               Q8H, PRN           [facility]



               00                                 Anxiety, 0           



                                                  Refill(s)           

 

     Lidocaine            Yes                      3 patch,           [facility]

rajeev



     Hydrochlori      5-08                               TOP,           l



     de 0.05      14:56:                               Daily,           [facility]



     MG/MG      00                                 Remove           



     Transdermal                                         after 12           



     Patch                                         hours, 0           



     [Lidoderm]                                         Refill(s)           

 

     Docusate            Yes                      100 mg = 1           [facility]

[facility]



     Sodium 100      5-08                               cap, PO,           l



     MG Oral      14:56:                               BID, 0           [facility]



     Capsule      00                                 Refill(s)           

 

     Zosyn            Yes                      0              [facility]



               5-08                               Refill(s)           l



               14:56:                                              [facility]



               00                                                

 

     celecoxib            Yes                      200 mg = 1           [facility]

[facility]



     200 mg oral      5-08                               cap, PO,           l



     capsule      14:56:                               BID, 0           [facility]



               00                                 Refill(s)           

 

     ascorbic            Yes                      500 mg = 1           [facility]

[facility]



     acid      5-08                               tab, PO,           l



               14:56:                               BID, 0           [facility]



               00                                 Refill(s)           

 

     acetaminoph            Yes                      1,000 mg =           

[facility]



     en 500 mg      5-08                               2 tab, PO,           l



     oral tablet      14:56:                               Q6Hnow, 0           H

ermann



               00                                 Refill(s)           

 

     Oxycodone            Yes                      5 mg = 1           [facility]

rajeev



     Hydrochlori      5-08                               tab, PO,           l



     de 5 MG      14:56:                               Q4H, PRN           [facility]uel

n



     Oral Tablet      00                                 Pain Score           



                                                  4-6, 0           



                                                  Refill(s)           

 

     zinc            Yes                      220 mg = 1           [facility]



     sulfate 220      5-08                               cap, PO,           l



     mg oral      14:56:                               Daily, 0           [facility]

n



     capsule      00                                 Refill(s)           

 

     multivitami            Yes                      1 tab, PO,           

[facility]



     n         5-08                               Daily, 0           l



               14:56:                               Refill(s)           [facility]



               00                                                

 

     methocarbam            Yes                      1,000 mg =           

[facility]



     ol 500 mg      5-08                               2 tab, PO,           l



     oral tablet      14:56:                               Q8H, 0           [facility]erm

[place]



               00                                 Refill(s)           

 

     LORazepam            Yes                      0.5 mg = 1           [facility]

[facility]



     0.5 mg oral      5-08                               tab, PO,           l



     tablet      14:56:                               Q8H, PRN           [facility]



               00                                 Anxiety, 0           



                                                  Refill(s)           

 

     Lidocaine            Yes                      3 patch,           [facility]

rajeev



     Hydrochlori      5-08                               TOP,           l



     de 0.05      14:56:                               Daily,           [facility]



     MG/MG      00                                 Remove           



     Transdermal                                         after 12           



     Patch                                         hours, 0           



     [Lidoderm]                                         Refill(s)           

 

     Docusate            Yes                      100 mg = 1           [facility]

[facility]



     Sodium 100      5-08                               cap, PO,           l



     MG Oral      14:56:                               BID, 0           [facility]



     Capsule      00                                 Refill(s)           

 

     Zosyn            Yes                      0              [facility]



               5-08                               Refill(s)           l



               14:56:                                              [facility]



               00                                                

 

     celecoxib            Yes                      200 mg = 1           [facility]

[facility]



     200 mg oral      5-08                               cap, PO,           l



     capsule      14:56:                               BID, 0           [facility]



               00                                 Refill(s)           

 

     ascorbic            Yes                      500 mg = 1           [facility]

[facility]



     acid      5-08                               tab, PO,           l



               14:56:                               BID, 0           [facility]



               00                                 Refill(s)           

 

     acetaminoph            Yes                      1,000 mg =           

[facility]



     en 500 mg      5-08                               2 tab, PO,           l



     oral tablet      14:56:                               Q6Hnow, 0           H

ermann



               00                                 Refill(s)           

 

     Oxycodone            Yes                      5 mg = 1           [facility]

rajeev



     Hydrochlori      5-08                               tab, PO,           l



     de 5 MG      14:56:                               Q4H, PRN           [facility]

n



     Oral Tablet      00                                 Pain Score           



                                                  4-6, 0           



                                                  Refill(s)           

 

     zinc            Yes                      220 mg = 1           [facility]



     sulfate 220      5-08                               cap, PO,           l



     mg oral      14:56:                               Daily, 0           [facility]

n



     capsule      00                                 Refill(s)           

 

     multivitami            Yes                      1 tab, PO,           

[facility]



     n         5-08                               Daily, 0           l



               14:56:                               Refill(s)           [facility]



               00                                                

 

     methocarbam            Yes                      1,000 mg =           

[facility]



     ol 500 mg      5-08                               2 tab, PO,           l



     oral tablet      14:56:                               Q8H, 0           [facility]

[place]



               00                                 Refill(s)           

 

     LORazepam            Yes                      0.5 mg = 1           [facility]

[facility]



     0.5 mg oral      5-08                               tab, PO,           l



     tablet      14:56:                               Q8H, PRN           [facility]



               00                                 Anxiety, 0           



                                                  Refill(s)           

 

     Lidocaine            Yes                      3 patch,           [facility]

rajeev



     Hydrochlori      5-08                               TOP,           l



     de 0.05      14:56:                               Daily,           [facility]



     MG/MG      00                                 Remove           



     Transdermal                                         after 12           



     Patch                                         hours, 0           



     [Lidoderm]                                         Refill(s)           

 

     Docusate            Yes                      100 mg = 1           [facility]

[facility]



     Sodium 100      5-08                               cap, PO,           l



     MG Oral      14:56:                               BID, 0           [facility]



     Capsule      00                                 Refill(s)           

 

     Zosyn            Yes                      0              [facility]



               5-08                               Refill(s)           l



               14:56:                                              [facility]



               00                                                

 

     celecoxib            Yes                      200 mg = 1           [facility]

[facility]



     200 mg oral      5-08                               cap, PO,           l



     capsule      14:56:                               BID, 0           [facility]



               00                                 Refill(s)           

 

     ascorbic            Yes                      500 mg = 1           [facility]

[facility]



     acid      5-08                               tab, PO,           l



               14:56:                               BID, 0           [facility]



               00                                 Refill(s)           

 

     acetaminoph            Yes                      1,000 mg =           

[facility]



     en 500 mg      5-08                               2 tab, PO,           l



     oral tablet      14:56:                               Q6Hnow, 0           H

ermann



               00                                 Refill(s)           

 

     Oxycodone            Yes                      5 mg = 1           [facility]

rajeev



     Hydrochlori      5-08                               tab, PO,           l



     de 5 MG      14:56:                               Q4H, PRN           [facility]

n



     Oral Tablet      00                                 Pain Score           



                                                  4-6, 0           



                                                  Refill(s)           

 

     zinc            Yes                      220 mg = 1           [facility]



     sulfate 220      5-08                               cap, PO,           l



     mg oral      14:56:                               Daily, 0           [facility]

n



     capsule      00                                 Refill(s)           

 

     multivitami            Yes                      1 tab, PO,           

[facility]



     n         5-08                               Daily, 0           l



               14:56:                               Refill(s)           [facility]



               00                                                

 

     methocarbam            Yes                      1,000 mg =           

[facility]



     ol 500 mg      5-08                               2 tab, PO,           l



     oral tablet      14:56:                               Q8H, 0           [facility]

[place]



               00                                 Refill(s)           

 

     LORazepam            Yes                      0.5 mg = 1           [facility]

[facility]



     0.5 mg oral      5-08                               tab, PO,           l



     tablet      14:56:                               Q8H, PRN           [facility]



               00                                 Anxiety, 0           



                                                  Refill(s)           

 

     Lidocaine            Yes                      3 patch,           [facility]

rajeev



     Hydrochlori      5-08                               TOP,           l



     de 0.05      14:56:                               Daily,           [facility]



     MG/MG      00                                 Remove           



     Transdermal                                         after 12           



     Patch                                         hours, 0           



     [Lidoderm]                                         Refill(s)           

 

     Docusate            Yes                      100 mg = 1           [facility]

[facility]



     Sodium 100      5-08                               cap, PO,           l



     MG Oral      14:56:                               BID, 0           [facility]



     Capsule      00                                 Refill(s)           

 

     Zosyn            Yes                      0              [facility]



               5-08                               Refill(s)           l



               14:56:                                              [facility]



               00                                                

 

     celecoxib            Yes                      200 mg = 1           [facility]

[facility]



     200 mg oral      5-08                               cap, PO,           l



     capsule      14:56:                               BID, 0           [facility]



               00                                 Refill(s)           

 

     ascorbic            Yes                      500 mg = 1           [facility]

[facility]



     acid      5-08                               tab, PO,           l



               14:56:                               BID, 0           [facility]



               00                                 Refill(s)           

 

     acetaminoph            Yes                      1,000 mg =           

[facility]



     en 500 mg      5-08                               2 tab, PO,           l



     oral tablet      14:56:                               Q6Hnow, 0           H

ermann



               00                                 Refill(s)           

 

     Oxycodone            Yes                      5 mg = 1           [facility]

rajeev



     Hydrochlori      5-08                               tab, PO,           l



     de 5 MG      14:56:                               Q4H, PRN           [facility]

n



     Oral Tablet      00                                 Pain Score           



                                                  4-6, 0           



                                                  Refill(s)           

 

     zinc            Yes                      220 mg = 1           [facility]



     sulfate 220      5-08                               cap, PO,           l



     mg oral      14:56:                               Daily, 0           [facility]

n



     capsule      00                                 Refill(s)           

 

     multivitami      0      Yes                      1 tab, PO,           

[facility]



     n         5-08                               Daily, 0           l



               14:56:                               Refill(s)           [facility]



               00                                                

 

     methocarbam            Yes                      1,000 mg =           

[facility]



     ol 500 mg      5-08                               2 tab, PO,           l



     oral tablet      14:56:                               Q8H, 0           [facility]

[place]



               00                                 Refill(s)           

 

     LORazepam            Yes                      0.5 mg = 1           [facility]

[facility]



     0.5 mg oral      5-08                               tab, PO,           l



     tablet      14:56:                               Q8H, PRN           [facility]



               00                                 Anxiety, 0           



                                                  Refill(s)           

 

     Lidocaine            Yes                      3 patch,           [facility]

rajeev



     Hydrochlori      5-08                               TOP,           l



     de 0.05      14:56:                               Daily,           [facility]



     MG/MG      00                                 Remove           



     Transdermal                                         after 12           



     Patch                                         hours, 0           



     [Lidoderm]                                         Refill(s)           

 

     Docusate            Yes                      100 mg = 1           [facility]

[facility]



     Sodium 100      5-08                               cap, PO,           l



     MG Oral      14:56:                               BID, 0           [facility]



     Capsule      00                                 Refill(s)           

 

     Zosyn            Yes                      0              [facility]



               5-08                               Refill(s)           l



               14:56:                                              [facility]



               00                                                

 

     celecoxib            Yes                      200 mg = 1           [facility]

[facility]



     200 mg oral      5-08                               cap, PO,           l



     capsule      14:56:                               BID, 0           [facility]



               00                                 Refill(s)           

 

     ascorbic            Yes                      500 mg = 1           [facility]

[facility]



     acid      5-08                               tab, PO,           l



               14:56:                               BID, 0           [facility]



               00                                 Refill(s)           

 

     acetaminoph            Yes                      1,000 mg =           

[facility]



     en 500 mg      5-08                               2 tab, PO,           l



     oral tablet      14:56:                               Q6Hnow, 0           H

[facility]



               00                                 Refill(s)           

 

     Oxycodone            Yes                      5 mg = 1           [facility]

rajeev



     Hydrochlori      5-08                               tab, PO,           l



     de 5 MG      14:56:                               Q4H, PRN           [facility]

n



     Oral Tablet      00                                 Pain Score           



                                                  4-6, 0           



                                                  Refill(s)           

 

     zinc            Yes                      220 mg = 1           [facility]



     sulfate 220      5-08                               cap, PO,           l



     mg oral      14:56:                               Daily, 0           [facility]

n



     capsule      00                                 Refill(s)           

 

     multivitami      0      Yes                      1 tab, PO,           

[facility]



     n         5-08                               Daily, 0           l



               14:56:                               Refill(s)           [facility]



               00                                                

 

     methocarbam            Yes                      1,000 mg =           

[facility]



     ol 500 mg      5-08                               2 tab, PO,           l



     oral tablet      14:56:                               Q8H, 0           [facility]

[facility]



               00                                 Refill(s)           

 

     LORazepam            Yes                      0.5 mg = 1           Me

[facility]



     0.5 mg oral      5-08                               tab, PO,           l



     tablet      14:56:                               Q8H, PRN           [facility]



               00                                 Anxiety, 0           



                                                  Refill(s)           

 

     Lidocaine            Yes                      3 patch,           [facility]

rajeev



     Hydrochlori      5-08                               TOP,           l



     de 0.05      14:56:                               Daily,           [facility]



     MG/MG      00                                 Remove           



     Transdermal                                         after 12           



     Patch                                         hours, 0           



     [Lidoderm]                                         Refill(s)           

 

     Robaxin            No                       Notes:           [facility]



               5-06                               (Same           l



               21:00:                               as:Robaxin           Jose



                                                )              

 

     Robaxin            No                       Notes:           [facility]



               5-06                               (Same           l



               21:00:                               as:Robaxin           Jose



                                                )              

 

     Robaxin            No                       Notes:           [facility]



               5-06                               (Same           l



               21:00:                               as:Robaxin           [facility]



                                                )              

 

     Robaxin            No                       Notes:           [facility]



               5-06                               (Same           l



               21:00:                               as:Robaxin           [facility]



               00                                 )              

 

     Robaxin            No                       Notes:           [facility]



               5-06                               (Same           l



               21:00:                               as:Robaxin           Jose



                                                )              

 

     Robaxin            No                       Notes:           [facility]



               5-06                               (Same           l



               21:00:                               as:Robaxin           [facility]



               00                                 )              

 

     Robaxin            No                       Notes:           [facility]



               5-06                               (Same           l



               21:00:                               as:Robaxin           Jose



               00                                 )              

 

     Oxycodone            No                       Notes:           [facility]

a



     Hydrochlori      5-06                               (Same as:           l



     de 5 MG      18:06:                               Roxicodone           [facility]

nguyen



     Oral Tablet      00                                 )              

 

     Oxycodone            No                       Notes:           [facility]

a



     Hydrochlori      5-06                               (Same as:           l



     de 5 MG      18:06:                               Roxicodone           [facility]

nguyen



     Oral Tablet                                       )              

 

     Oxycodone            No                       Notes:           [facility]

a



     Hydrochlori      5-06                               (Same as:           l



     de 5 MG      18:06:                               Roxicodone           [facility]

nguyen



     Oral Tablet                                       )              

 

     Oxycodone            No                       Notes:           [facility]

a



     Hydrochlori      5-06                               (Same as:           l



     de 5 MG      18:06:                               Roxicodone           [facility]

ngueyn



     Oral Tablet      00                                 )              

 

     Oxycodone            No                       Notes:           [facility]

a



     Hydrochlori      5-06                               (Same as:           l



     de 5 MG      18:06:                               Roxicodone           Herm

nugyen



     Oral Tablet                                       )              

 

     Oxycodone            No                       Notes:           [facility]

a



     Hydrochlori      5-06                               (Same as:           l



     de 5 MG      18:06:                               Roxicodone           [facility]

nguyen



     Oral Tablet      00                                 )              

 

     Oxycodone            No                       Notes:           [facility]

a



     Hydrochlori      5-06                               (Same as:           l



     de 5 MG      18:06:                               Roxicodone           [facility]

nguyen



     Oral Tablet      00                                 )              

 

     Ativan            No                       Notes:           [facility]



               5-06                               (Same as:           l



               15:18:                               Ativan)           [facility]



                                                               

 

     Ativan            No                       Notes:           [facility]



               5-06                               (Same as:           l



               15:18:                               Ativan)           [facility]



                                                               

 

     Ativan            No                       Notes:           [facility]



               5-06                               (Same as:           l



               15:18:                               Ativan)           [facility]



                                                               

 

     Ativan            No                       Notes:           [facility]



               5-06                               (Same as:           l



               15:18:                               Ativan)           Jose



                                                               

 

     Ativan            No                       Notes:           [facility]



               5-06                               (Same as:           l



               15:18:                               Ativan)           Jose



                                                               

 

     Ativan            No                       Notes:           [facility]



               5-06                               (Same as:           l



               15:18:                               Ativan)           Jose



                                                               

 

     Ativan            No                       Notes:           [facility]



               5-06                               (Same as:           l



               15:18:                               Ativan)           Jose



               00                                                

 

     Trazodone            No                       Notes:           [facility]

a



     Hydrochlori      5-06                               (Same As:           l



     de 50 MG      02:00:                               Desyrel)           [facility]il[facility]

nn



     Oral Tablet      00                                                

 

     remove            No                       Notes:           [facility]



     patch      5-06                               Remove           l



               02:00:                               patch 12           [facility]



               00                                 hours           



                                                  after           



                                                  applicatio           



                                                  n each           



                                                  day.           

 

     Trazodone            No                       Notes:           [facility]

a



     Hydrochlori      5-06                               (Same As:           l



     de 50 MG      02:00:                               Desyrel)           [facility]

nn



     Oral Tablet      00                                                

 

     remove            No                       Notes:           [facility]



     patch      5-06                               Remove           l



               02:00:                               patch 12           [facility]



               00                                 hours           



                                                  after           



                                                  applicatio           



                                                  n each           



                                                  day.           

 

     Trazodone            No                       Notes:           [facility]

a



     Hydrochlori      5-06                               (Same As:           l



     de 50 MG      02:00:                               Desyrel)           [facility]

nn



     Oral Tablet      00                                                

 

     remove            No                       Notes:           [facility]



     patch      5-06                               Remove           l



               02:00:                               patch 12           [facility]



               00                                 hours           



                                                  after           



                                                  applicatio           



                                                  n each           



                                                  day.           

 

     Trazodone            No                       Notes:           [facility]

a



     Hydrochlori      5-06                               (Same As:           l



     de 50 MG      02:00:                               Desyrel)           [facility]

nn



     Oral Tablet      00                                                

 

     remove            No                       Notes:           [facility]



     patch      5-06                               Remove           l



               02:00:                               patch 12           Jose



               00                                 hours           



                                                  after           



                                                  applicatio           



                                                  n each           



                                                  day.           

 

     Trazodone            No                       Notes:           [facility]

a



     Hydrochlori      5-06                               (Same As:           l



     de 50 MG      02:00:                               Desyrel)           [facility]

nn



     Oral Tablet                                                      

 

     remove            No                       Notes:           [facility]



     patch      5-06                               Remove           l



               02:00:                               patch 12           Jose



               00                                 hours           



                                                  after           



                                                  applicatio           



                                                  n each           



                                                  day.           

 

     Trazodone            No                       Notes:           [facility]

a



     Hydrochlori      5-06                               (Same As:           l



     de 50 MG      02:00:                               Desyrel)           [facility]

nn



     Oral Tablet      00                                                

 

     remove            No                       Notes:           [facility]



     patch      5-06                               Remove           l



               02:00:                               patch 12           [facility]



               00                                 hours           



                                                  after           



                                                  applicatio           



                                                  n each           



                                                  day.           

 

     Trazodone            No                       Notes:           [facility]

a



     Hydrochlori      5-06                               (Same As:           l



     de 50 MG      02:00:                               Desyrel)           [facility]

nn



     Oral Tablet                                                      

 

     remove            No                       Notes:           [facility]



     patch      5-06                               Remove           l



               02:00:                               patch 12           [facility]



               00                                 hours           



                                                  after           



                                                  applicatio           



                                                  n each           



                                                  day.           

 

     Oxycodone            No                       Notes:           [facility]

a



     Hydrochlori      5-05                               (Same as:           l



     de 5 MG      22:01:                               Roxicodone           [facility]

nguyen



     Oral Tablet      00                                 )              

 

     Oxycodone            No                       Notes:           [facility]

a



     Hydrochlori      5-05                               (Same as:           l



     de 5 MG      22:01:                               Roxicodone           [facility]

nguyen



     Oral Tablet      00                                 )              

 

     Oxycodone            No                       Notes:           [facility]

a



     Hydrochlori      5-05                               (Same as:           l



     de 5 MG      22:01:                               Roxicodone           [facility]

nguyen



     Oral Tablet      00                                 )              

 

     Oxycodone            No                       Notes:           [facility]

a



     Hydrochlori      5-05                               (Same as:           l



     de 5 MG      22:01:                               Roxicodone           [facility]

nguyen



     Oral Tablet      00                                 )              

 

     Oxycodone            No                       Notes:           [facility]

a



     Hydrochlori      5-05                               (Same as:           l



     de 5 MG      22:01:                               Roxicodone           [facility]

nguyen



     Oral Tablet      00                                 )              

 

     Oxycodone            No                       Notes:           [facility]

a



     Hydrochlori      5-05                               (Same as:           l



     de 5 MG      22:01:                               Roxicodone           [facility]

nguyen



     Oral Tablet      00                                 )              

 

     Oxycodone      -0      No                       Notes:           [facility]

a



     Hydrochlori      5-05                               (Same as:           l



     de 5 MG      22:01:                               Roxicodone           [facility]erm

nguyen



     Oral Tablet      00                                 )              

 

     Celebrex      0      No                       Notes:           [facility]



               5-05                               NSAID.           l



               22:00:                               Please           [facility]



               00                                 check           



                                                  indication           



                                                  . Not for           



                                                  seizure.           



                                                  (Same As:           



                                                  CeleBREX)           

 

     Celebrex      -0      No                       Notes:           [facility]



               5-05                               NSAID.           l



               22:00:                               Please           [facility]



               00                                 check           



                                                  indication           



                                                  . Not for           



                                                  seizure.           



                                                  (Same As:           



                                                  CeleBREX)           

 

     Celebrex      -0      No                       Notes:           [facility]



               5-05                               NSAID.           l



               22:00:                               Please           [facility]



               00                                 check           



                                                  indication           



                                                  . Not for           



                                                  seizure.           



                                                  (Same As:           



                                                  CeleBREX)           

 

     Celebrex      -0      No                       Notes:           [facility]



               5-05                               NSAID.           l



               22:00:                               Please           Jose



               00                                 check           



                                                  indication           



                                                  . Not for           



                                                  seizure.           



                                                  (Same As:           



                                                  CeleBREX)           

 

     Celebrex      0      No                       Notes:           [facility]



               5-05                               NSAID.           l



               22:00:                               Please           Jose



               00                                 check           



                                                  indication           



                                                  . Not for           



                                                  seizure.           



                                                  (Same As:           



                                                  CeleBREX)           

 

     Celebrex      -0      No                       Notes:           [facility]



               5-05                               NSAID.           l



               22:00:                               Please           Jose



               00                                 check           



                                                  indication           



                                                  . Not for           



                                                  seizure.           



                                                  (Same As:           



                                                  CeleBREX)           

 

     Celebrex      -0      No                       Notes:           [facility]



               5-05                               NSAID.           l



               22:00:                               Please           [facility]



               00                                 check           



                                                  indication           



                                                  . Not for           



                                                  seizure.           



                                                  (Same As:           



                                                  CeleBREX)           

 

     Vancomycin      2018-0      No                        2001 mg:           Me

moria



               5-05                               infuse           l



               21:00:                               over 2.5           [facility]



               00                                 hours           

 

     Vancomycin      2018-0      No                        2001 mg:           Me

moria



               5-05                               infuse           l



               21:00:                               over 2.5           Jose



               00                                 hours           

 

     Vancomycin      2018-0      No                        2001 mg:           Me

moria



               5-05                               infuse           l



               21:00:                               over 2.5           Jose



               00                                 hours           

 

     Vancomycin      2018-0      No                        2001 mg:           Me

moria



               5-05                               infuse           l



               21:00:                               over 2.5           [facility]



               00                                 hours           

 

     Vancomycin      2018-0      No                        2001 mg:           Me

moria



               5-05                               infuse           l



               21:00:                               over 2.5           Jose



               00                                 hours           

 

     Vancomycin      2018-0      No                        2001 mg:           Me

moria



               5-05                               infuse           l



               21:00:                               over 2.5           Jose



               00                                 hours           

 

     Vancomycin            No                        2001 mg:           Me

moria



               5-05                               infuse           l



               21:00:                               over 2.5           Jose



               00                                 hours           

 

     Lidocaine            No                       Notes:           [facility]

a



     Hydrochlori      5-05                               Apply only           l



     de 0.05      14:00:                               once for           Miguel

n



     MG/MG      00                                 up to 12           



     Transdermal                                         hours in a           



     Patch                                         24-hour           



     [Lidoderm]                                         period (12           



                                                  hours on           



                                                  and 12           



                                                  hours           



                                                  off).           



                                                  (Same as:           



                                                  Lidoderm)           



                                                  "Remove           



                                                  old patch           



                                                  before           



                                                  applicatio           



                                                  n of new           



                                                  patch"           

 

     Lidocaine            No                       Notes:           [facility]

a



     Hydrochlori      5-05                               Apply only           l



     de 0.05      14:00:                               once for           [facility]iguel

n



     MG/MG      00                                 up to 12           



     Transdermal                                         hours in a           



     Patch                                         24-hour           



     [Lidoderm]                                         period (12           



                                                  hours on           



                                                  and 12           



                                                  hours           



                                                  off).           



                                                  (Same as:           



                                                  Lidoderm)           



                                                  "Remove           



                                                  old patch           



                                                  before           



                                                  applicatio           



                                                  n of new           



                                                  patch"           

 

     Lidocaine            No                       Notes:           [facility]

a



     Hydrochlori      5-05                               Apply only           l



     de 0.05      14:00:                               once for           [facility]

n



     MG/MG      00                                 up to 12           



     Transdermal                                         hours in a           



     Patch                                         24-hour           



     [Lidoderm]                                         period (12           



                                                  hours on           



                                                  and 12           



                                                  hours           



                                                  off).           



                                                  (Same as:           



                                                  Lidoderm)           



                                                  "Remove           



                                                  old patch           



                                                  before           



                                                  applicatio           



                                                  n of new           



                                                  patch"           

 

     Lidocaine            No                       Notes:           [facility]

a



     Hydrochlori      5-05                               Apply only           l



     de 0.05      14:00:                               once for           [facility]

n



     MG/MG      00                                 up to 12           



     Transdermal                                         hours in a           



     Patch                                         24-hour           



     [Lidoderm]                                         period (12           



                                                  hours on           



                                                  and 12           



                                                  hours           



                                                  off).           



                                                  (Same as:           



                                                  Lidoderm)           



                                                  "Remove           



                                                  old patch           



                                                  before           



                                                  applicatio           



                                                  n of new           



                                                  patch"           

 

     Lidocaine            No                       Notes:           [facility]

a



     Hydrochlori      5-05                               Apply only           l



     de 0.05      14:00:                               once for           [facility]

n



     MG/MG      00                                 up to 12           



     Transdermal                                         hours in a           



     Patch                                         24-hour           



     [Lidoderm]                                         period (12           



                                                  hours on           



                                                  and 12           



                                                  hours           



                                                  off).           



                                                  (Same as:           



                                                  Lidoderm)           



                                                  "Remove           



                                                  old patch           



                                                  before           



                                                  applicatio           



                                                  n of new           



                                                  patch"           

 

     Lidocaine            No                       Notes:           [facility]

a



     Hydrochlori      5-05                               Apply only           l



     de 0.05      14:00:                               once for           [facility]

n



     MG/MG      00                                 up to 12           



     Transdermal                                         hours in a           



     Patch                                         24-hour           



     [Lidoderm]                                         period (12           



                                                  hours on           



                                                  and 12           



                                                  hours           



                                                  off).           



                                                  (Same as:           



                                                  Lidoderm)           



                                                  "Remove           



                                                  old patch           



                                                  before           



                                                  applicatio           



                                                  n of new           



                                                  patch"           

 

     Lidocaine            No                       Notes:           [facility]

a



     Hydrochlori      5-05                               Apply only           l



     de 0.05      14:00:                               once for           [facility]

n



     MG/MG      00                                 up to 12           



     Transdermal                                         hours in a           



     Patch                                         24-hour           



     [Lidoderm]                                         period (12           



                                                  hours on           



                                                  and 12           



                                                  hours           



                                                  off).           



                                                  (Same as:           



                                                  Lidoderm)           



                                                  "Remove           



                                                  old patch           



                                                  before           



                                                  applicatio           



                                                  n of new           



                                                  patch"           

 

     Phenergan            No                       Notes: Do           [facility]

[facility]



               5-04                               not give           l



               22:40:                               IV push.           [facility]



               00                                 (Same as:           



                                                  Phenergan)           

 

     Dilaudid            No                       Notes:           [facility]



               5-04                               Same as           l



               22:40:                               Dilaudid           [facility]



               00                                                

 

     Phenergan            No                       Notes: Do           [facility]

[facility]



               5-04                               not give           l



               22:40:                               IV push.           [facility]



               00                                 (Same as:           



                                                  Phenergan)           

 

     Dilaudid            No                       Notes:           [facility]



               5-04                               Same as           l



               22:40:                               Dilaudid           [facility]



               00                                                

 

     Phenergan            No                       Notes: Do           [facility]

[facility]



               5-04                               not give           l



               22:40:                               IV push.           [facility]



               00                                 (Same as:           



                                                  Phenergan)           

 

     Dilaudid            No                       Notes:           [facility]



               5-04                               Same as           l



               22:40:                               Dilaudid           [facility]



               00                                                

 

     Phenergan            No                       Notes: Do           [facility]

[facility]



               5-04                               not give           l



               22:40:                               IV push.           [facility]



               00                                 (Same as:           



                                                  Phenergan)           

 

     Dilaudid            No                       Notes:           [facility]



               5-04                               Same as           l



               22:40:                               Dilaudid           [facility]



               00                                                

 

     Phenergan            No                       Notes: Do           [facility]

[facility]



               5-04                               not give           l



               22:40:                               IV push.           [facility]



               00                                 (Same as:           



                                                  Phenergan)           

 

     Dilaudid            No                       Notes:           [facility]



               5-04                               Same as           l



               22:40:                               Dilaudid           [facility]



               00                                                

 

     Phenergan            No                       Notes: Do           [facility]

[facility]



               5-04                               not give           l



               22:40:                               IV push.           [facility]



               00                                 (Same as:           



                                                  Phenergan)           

 

     Dilaudid            No                       Notes:           [facility]



               5-04                               Same as           l



               22:40:                               Dilaudid           [facility]



               00                                                

 

     Phenergan            No                       Notes: Do           [facility]

[facility]



               5-04                               not give           l



               22:40:                               IV push.           [facility]



               00                                 (Same as:           



                                                  Phenergan)           

 

     Dilaudid            No                       Notes:           [facility]



               5-04                               Same as           l



               22:40:                               Dilaudid           [facility]



                                                               

 

     Tramadol            No                       Notes: Not           [facility]

[facility]



               5-04                               to exceed           l



               22:00:                               400mg/day.           [facility]



               00                                 (Same As:           



                                                  Ultram)           

 

     gabapentin            No                       Notes:           [facility]

ia



               5-04                               (Same as:           l



               22:00:                               Neurontin)           [facility]



                                                               

 

     Acetaminoph            No                       Notes: Max           

[facility]



     en        5-04                               acetaminop           l



               22:00:                               hen 4000           [facility]



               00                                 mg/day (4           



                                                  gm/day).           



                                                  (Same as:           



                                                  Tylenol           



                                                  Extra           



                                                  Strength)           

 

     Robaxin            No                       Notes:           [facility]



               5-04                               (Same           l



               22:00:                               as:Robaxin           [facility]



                                                )              

 

     Tramadol            No                       Notes: Not           [facility]

[facility]



               5-04                               to exceed           l



               22:00:                               400mg/day.           [facility]



               00                                 (Same As:           



                                                  Ultram)           

 

     gabapentin            No                       Notes:           [facility]

[facility]



               5-04                               (Same as:           l



               22:00:                               Neurontin)           [facility]



                                                               

 

     Acetaminoph            No                       Notes: Max           

[facility]



     en        5-04                               acetaminop           l



               22:00:                               hen 4000           [facility]



               00                                 mg/day (4           



                                                  gm/day).           



                                                  (Same as:           



                                                  Tylenol           



                                                  Extra           



                                                  Strength)           

 

     Robaxin            No                       Notes:           [facility]



               5-04                               (Same           l



               22:00:                               as:Robaxin           [facility]                                 )              

 

     Tramadol            No                       Notes: Not           [facility]

[facility]



               5-04                               to exceed           l



               22:00:                               400mg/day.           [facility]



               00                                 (Same As:           



                                                  Ultram)           

 

     gabapentin            No                       Notes:           [facility]

[facility]



               5-04                               (Same as:           l



               22:00:                               Neurontin)           [facility]



                                                               

 

     Acetaminoph            No                       Notes: Max           

[facility]



     en        5-04                               acetaminop           l



               22:00:                               hen 4000           [facility]



               00                                 mg/day (4           



                                                  gm/day).           



                                                  (Same as:           



                                                  Tylenol           



                                                  Extra           



                                                  Strength)           

 

     Robaxin            No                       Notes:           [facility]



               5-04                               (Same           l



               22:00:                               as:Robaxin           [facility]



                                                )              

 

     Tramadol            No                       Notes: Not           [facility]

[facility]



               5-04                               to exceed           l



               22:00:                               400mg/day.           [facility]



               00                                 (Same As:           



                                                  Ultram)           

 

     gabapentin            No                       Notes:           [facility]

[facility]



               5-04                               (Same as:           l



               22:00:                               Neurontin)           [facility]



                                                               

 

     Acetaminoph            No                       Notes: Max           

[facility]



     en        5-04                               acetaminop           l



               22:00:                               hen 4000           [facility]



               00                                 mg/day (4           



                                                  gm/day).           



                                                  (Same as:           



                                                  Tylenol           



                                                  Extra           



                                                  Strength)           

 

     Robaxin            No                       Notes:           [facility]



               5-04                               (Same           l



               22:00:                               as:Robaxin           [facility]



               00                                 )              

 

     Tramadol            No                       Notes: Not           [facility]

[facility]



               5-04                               to exceed           l



               22:00:                               400mg/day.           [facility]



               00                                 (Same As:           



                                                  Ultram)           

 

     gabapentin            No                       Notes:           [facility]

[facility]



               5-04                               (Same as:           l



               22:00:                               Neurontin)           [facility]



                                                               

 

     Acetaminoph            No                       Notes: Max           

[facility]



     en        5-04                               acetaminop           l



               22:00:                               hen 4000           [facility]



               00                                 mg/day (4           



                                                  gm/day).           



                                                  (Same as:           



                                                  Tylenol           



                                                  Extra           



                                                  Strength)           

 

     Robaxin            No                       Notes:           [facility]



               5-04                               (Same           l



               22:00:                               as:Robaxin           [facility]



               00                                 )              

 

     Tramadol            No                       Notes: Not           [facility]

[facility]



               5-04                               to exceed           l



               22:00:                               400mg/day.           [facility]



               00                                 (Same As:           



                                                  Ultram)           

 

     gabapentin            No                       Notes:           [facility]

[facility]



               5-04                               (Same as:           l



               22:00:                               Neurontin)           [facility]



               00                                                

 

     Acetaminoph            No                       Notes: Max           

[facility]



     en        5-04                               acetaminop           l



               22:00:                               hen 4000           [facility]



               00                                 mg/day (4           



                                                  gm/day).           



                                                  (Same as:           



                                                  Tylenol           



                                                  Extra           



                                                  Strength)           

 

     Robaxin            No                       Notes:           [facility]



               5-04                               (Same           l



               22:00:                               as:Robaxin           [facility]



               00                                 )              

 

     Tramadol      0      No                       Notes: Not           [facility]

[facility]



               5-04                               to exceed           l



               22:00:                               400mg/day.           [facility]



               00                                 (Same As:           



                                                  Ultram)           

 

     gabapentin            No                       Notes:           [facility]

[facility]



               5-04                               (Same as:           l



               22:00:                               Neurontin)           [facility]



                                                               

 

     Acetaminoph            No                       Notes: Max           

[facility]



     en        5-04                               acetaminop           l



               22:00:                               hen 4000           [facility]



               00                                 mg/day (4           



                                                  gm/day).           



                                                  (Same as:           



                                                  Tylenol           



                                                  Extra           



                                                  Strength)           

 

     Robaxin            No                       Notes:           [facility]



               5-04                               (Same           l



               22:00:                               as:Robaxin           [facility]



               00                                 )              

 

     Oxycodone            No                       Notes:           [facility]

a



     Hydrochlori      5-04                               (Same as:           l



     de 5 MG      21:33:                               Roxicodone           [facility]

nguyen



     Oral Tablet      00                                 )              

 

     Oxycodone            No                       Notes:           [facility]

a



     Hydrochlori      5-04                               (Same as:           l



     de 5 MG      21:33:                               Roxicodone           [facility]

nguyen



     Oral Tablet      00                                 )              

 

     Oxycodone            No                       Notes:           [facility]

a



     Hydrochlori      5-04                               (Same as:           l



     de 5 MG      21:33:                               Roxicodone           [facility]

nguyen



     Oral Tablet      00                                 )              

 

     Oxycodone            No                       Notes:           [facility]

a



     Hydrochlori      5-04                               (Same as:           l



     de 5 MG      21:33:                               Roxicodone           [facility]

nguyen



     Oral Tablet      00                                 )              

 

     Oxycodone            No                       Notes:           [facility]

a



     Hydrochlori      5-04                               (Same as:           l



     de 5 MG      21:33:                               Roxicodone           [facility]

nguyen



     Oral Tablet      00                                 )              

 

     Oxycodone            No                       Notes:           [facility]

a



     Hydrochlori      5-04                               (Same as:           l



     de 5 MG      21:33:                               Roxicodone           [facility]

nguyen



     Oral Tablet      00                                 )              

 

     Oxycodone            No                       Notes:           [facility]

a



     Hydrochlori      5-04                               (Same as:           l



     de 5 MG      21:33:                               Roxicodone           [facility]

nguyen



     Oral Tablet      00                                 )              

 

     Beneprotein            No                       Notes:           [facility]

rajeev



     7 gm pkt      5-04                               (Same as:           l



               21:30:                               Beneprotei           [facility]



               00                                 n)             

 

     Beneprotein            No                       Notes:           [facility]

rajeev



     7 gm pkt      5-04                               (Same as:           l



               21:30:                               Beneprotei           Roe



               00                                 n)             

 

     Beneprotein            No                       Notes:           [facility]

rajeev



     7 gm pkt      5-04                               (Same as:           l



               21:30:                               Beneprotei           Jose



               00                                 n)             

 

     Beneprotein            No                       Notes:           [facility]

rajeev



     7 gm pkt      5-04                               (Same as:           l



               21:30:                               Beneprotei           [facility]oe



               00                                 n)             

 

     Beneprotein            No                       Notes:           [facility]

rajeev



     7 gm pkt      5-04                               (Same as:           l



               21:30:                               Beneprotei           Roe



               00                                 n)             

 

     Beneprotein            No                       Notes:           [facility]

rajeev



     7 gm pkt      5-04                               (Same as:           l



               21:30:                               Beneprotei           Roe



               00                                 n)             

 

     Beneprotein            No                       Notes:           [facility]

rajeev



     7 gm pkt      5-04                               (Same as:           l



               21:30:                               Beneprotei           Roe



               00                                 n)             

 

     Acetaminoph            No                       1 tab, PO,           

[facility]



     en 325 MG /      5-04                               TID, 0           l



     Oxycodone      17:12:                               Refill(s)           Her

child



     Hydrochlori      00                                                



     de 10 MG                                                        



     Oral Tablet                                                        



     [Percocet                                                        



     10/325]                                                        

 

     Acetaminoph            No                       1 tab, PO,           

[facility]



     en 325 MG /      5-04                               TID, 0           l



     Oxycodone      17:12:                               Refill(s)           Her

child



     Hydrochlori      00                                                



     de 10 MG                                                        



     Oral Tablet                                                        



     [Percocet                                                        



     10/325]                                                        

 

     Acetaminoph            No                       1 tab, PO,           

[facility]



     en 325 MG /      5-04                               TID, 0           l



     Oxycodone      17:12:                               Refill(s)           Her

child



     Hydrochlori      00                                                



     de 10 MG                                                        



     Oral Tablet                                                        



     [Percocet                                                        



     10/325]                                                        

 

     Acetaminoph            No                       1 tab, PO,           

[facility]



     en 325 MG /      5-04                               TID, 0           l



     Oxycodone      17:12:                               Refill(s)           Her

child



     Hydrochlori      00                                                



     de 10 MG                                                        



     Oral Tablet                                                        



     [Percocet                                                        



     10/325]                                                        

 

     Acetaminoph            No                       1 tab, PO,           

[facility]



     en 325 MG /      5-04                               TID, 0           l



     Oxycodone      17:12:                               Refill(s)           Her

child



     Hydrochlori      00                                                



     de 10 MG                                                        



     Oral Tablet                                                        



     [Percocet                                                        



     10/325]                                                        

 

     Acetaminoph            No                       1 tab, PO,           

[facility]



     en 325 MG /      5-04                               TID, 0           l



     Oxycodone      17:12:                               Refill(s)           Her

child



     Hydrochlori      00                                                



     de 10 MG                                                        



     Oral Tablet                                                        



     [Percocet                                                        



     10/325]                                                        

 

     Acetaminoph            No                       1 tab, PO,           

[facility]



     en 325 MG /      5-04                               TID, 0           l



     Oxycodone      17:12:                               Refill(s)           Her

child



     Hydrochlori      00                                                



     de 10 MG                                                        



     Oral Tablet                                                        



     [Percocet                                                        



     10/325]                                                        

 

     Dilaudid            No                       0.5 mg,           [facility]

a



               5-04                               0.25 mL,           l



               16:01:                               Route:                                            IVP, Drug           



                                                  form: INJ,           



                                                  ONCE,           



                                                  Start           



                                                  date:           



                                                  18           



                                                  11:01:00           



                                                  CDT, Stop           



                                                  date:           



                                                  18           



                                                  11:01:00           



                                                  CDT            

 

     Dilaudid      2018-0      No                       0.5 mg,           [facility]

a



               5-04                               0.25 mL,           l



               16:01:                               Route:           Jose                                 IVP, Drug           



                                                  form: INJ,           



                                                  ONCE,           



                                                  Start           



                                                  date:           



                                                  18           



                                                  11:01:00           



                                                  CDT, Stop           



                                                  date:           



                                                  18           



                                                  11:01:00           



                                                  CDT            

 

     Dilaudid      2018-0      No                       0.5 mg,           [facility]

a



               5-04                               0.25 mL,           l



               16:01:                               Route:           Jose                                 IVP, Drug           



                                                  form: INJ,           



                                                  ONCE,           



                                                  Start           



                                                  date:           



                                                  18           



                                                  11:01:00           



                                                  CDT, Stop           



                                                  date:           



                                                  18           



                                                  11:01:00           



                                                  CDT            

 

     Dilaudid      2018-0      No                       0.5 mg,           [facility]

a



               5-04                               0.25 mL,           l



               16:01:                               Route:                                            IVP, Drug           



                                                  form: INJ,           



                                                  ONCE,           



                                                  Start           



                                                  date:           



                                                  18           



                                                  11:01:00           



                                                  CDT, Stop           



                                                  date:           



                                                  18           



                                                  11:01:00           



                                                  CDT            

 

     Dilaudid      2018-0      No                       0.5 mg,           [facility]

a



               5-04                               0.25 mL,           l



               16:01:                               Route:           Jose                                 IVP, Drug           



                                                  form: INJ,           



                                                  ONCE,           



                                                  Start           



                                                  date:           



                                                  18           



                                                  11:01:00           



                                                  CDT, Stop           



                                                  date:           



                                                  18           



                                                  11:01:00           



                                                  CDT            

 

     Dilaudid      2018-0      No                       0.5 mg,           [facility]

a



               5-04                               0.25 mL,           l



               16:01:                               Route:           [facility]                                 IVP, Drug           



                                                  form: INJ,           



                                                  ONCE,           



                                                  Start           



                                                  date:           



                                                  18           



                                                  11:01:00           



                                                  CDT, Stop           



                                                  date:           



                                                  18           



                                                  11:01:00           



                                                  CDT            

 

     Dilaudid      2018-0      No                       0.5 mg,           [facility]

a



               5-04                               0.25 mL,           l



               16:01:                               Route:           Jose                                 IVP, Drug           



                                                  form: INJ,           



                                                  ONCE,           



                                                  Start           



                                                  date:           



                                                  18           



                                                  11:01:00           



                                                  CDT, Stop           



                                                  date:           



                                                  18           



                                                  11:01:00           



                                                  CDT            

 

     Phenergan      2018-0      No                       Notes:           [facility]

a



               5-04                               (Same as:           l



               15:43:                               Phenergan)                                                           

 

     Dilaudid      -0      No                       2 mg,           [facility]



               5-04                               Route:           l



               15:43:                               IVP, ONCE,           Jose



                                                Dosing           



                                                  Weight           



                                                  127.027,           



                                                  kg,            



                                                  Priority:           



                                                  STAT,           



                                                  Start           



                                                  date:           



                                                  18           



                                                  10:43:00           



                                                  CDT, Stop           



                                                  date:           



                                                  18           



                                                  10:43:00           



                                                  CDT            

 

     Phenergan      2018-0      No                       Notes:           [facility]

a



               5-04                               (Same as:           l



               15:43:                               Phenergan)                                                           

 

     Dilaudid      -0      No                       2 mg,           [facility]



               5-04                               Route:           l



               15:43:                               IVP, ONCE,           [facility]



                                                Dosing           



                                                  Weight           



                                                  127.027,           



                                                  kg,            



                                                  Priority:           



                                                  STAT,           



                                                  Start           



                                                  date:           



                                                  18           



                                                  10:43:00           



                                                  CDT, Stop           



                                                  date:           



                                                  18           



                                                  10:43:00           



                                                  CDT            

 

     Phenergan      2018-0      No                       Notes:           [facility]

a



               5-04                               (Same as:           l



               15:43:                               Phenergan)                                                           

 

     Dilaudid      -0      No                       2 mg,           [facility]



               5-04                               Route:           l



               15:43:                               IVP, ONCE,           [facility]



                                                Dosing           



                                                  Weight           



                                                  127.027,           



                                                  kg,            



                                                  Priority:           



                                                  STAT,           



                                                  Start           



                                                  date:           



                                                  18           



                                                  10:43:00           



                                                  CDT, Stop           



                                                  date:           



                                                  18           



                                                  10:43:00           



                                                  CDT            

 

     Phenergan      2018-0      No                       Notes:           [facility]

a



               5-04                               (Same as:           l



               15:43:                               Phenergan)                                                           

 

     Dilaudid      -0      No                       2 mg,           [facility]



               5-04                               Route:           l



               15:43:                               IVP, ONCE,           [facility]



                                                Dosing           



                                                  Weight           



                                                  127.027,           



                                                  kg,            



                                                  Priority:           



                                                  STAT,           



                                                  Start           



                                                  date:           



                                                  18           



                                                  10:43:00           



                                                  CDT, Stop           



                                                  date:           



                                                  18           



                                                  10:43:00           



                                                  CDT            

 

     Phenergan      2018-0      No                       Notes:           [facility]

a



               5-04                               (Same as:           l



               15:43:                               Phenergan)           Jose                                                

 

     Dilaudid      -0      No                       2 mg,           [facility]



               5-04                               Route:           l



               15:43:                               IVP, ONCE,           [facility]



               00                                 Dosing           



                                                  Weight           



                                                  127.027,           



                                                  kg,            



                                                  Priority:           



                                                  STAT,           



                                                  Start           



                                                  date:           



                                                  18           



                                                  10:43:00           



                                                  CDT, Stop           



                                                  date:           



                                                  18           



                                                  10:43:00           



                                                  CDT            

 

     Phenergan            No                       Notes:           [facility]

a



               5-04                               (Same as:           l



               15:43:                               Phenergan)                                                           

 

     Dilaudid            No                       2 mg,           [facility]



               5-04                               Route:           l



               15:43:                               IVP, ONCE,           Jose                                 Dosing           



                                                  Weight           



                                                  127.027,           



                                                  kg,            



                                                  Priority:           



                                                  STAT,           



                                                  Start           



                                                  date:           



                                                  18           



                                                  10:43:00           



                                                  CDT, Stop           



                                                  date:           



                                                  18           



                                                  10:43:00           



                                                  CDT            

 

     Phenergan            No                       Notes:           [facility]

a



               5-04                               (Same as:           l



               15:43:                               Phenergan)                                                           

 

     Dilaudid            No                       2 mg,           [facility]



               5-04                               Route:           l



               15:43:                               IVP, ONCE,           [facility]                                 Dosing           



                                                  Weight           



                                                  127.027,           



                                                  kg,            



                                                  Priority:           



                                                  STAT,           



                                                  Start           



                                                  date:           



                                                  18           



                                                  10:43:00           



                                                  CDT, Stop           



                                                  date:           



                                                  18           



                                                  10:43:00           



                                                  CDT            

 

     Docusate            No                       Notes:           [facility]



               5-04                               (Same as:           l



               14:00:                               Colace)                                            (Do Not           



                                                  Crush)           

 

     Zinc            No                       Notes:           [facility]



     Sulfate      5-04                               (Zinc           l



               14:00:                               sulfate           Jose                                 capsule) -           



                                                  220 mg           



                                                  Zinc           



                                                  sulfate =           



                                                  50 mg           



                                                  elemental           



                                                  zinc Same           



                                                  as Zinc           



                                                  Sulfate           

 

     ascorbic            No                       Notes:           [facility]



     acid      5-04                               (Same as:           l



               14:00:                               Vitamin C)                                                           

 

     multivitami            No                       Notes:           [facility]

rajeev



     n         5-04                               (Same           l



               14:00:                               as:Thera)                                            WASTE: F/P           



                                                  - Black; E           



                                                  -              



                                                  Municipal           



                                                  Trash Bin           



                                                  Take with           



                                                  food.           

 

     Docusate            No                       Notes:           [facility]



               5-04                               (Same as:           l



               14:00:                               Colace)                                            (Do Not           



                                                  Crush)           

 

     Zinc            No                       Notes:           [facility]



     Sulfate      5-04                               (Zinc           l



               14:00:                               sulfate           Jose



                                                capsule) -           



                                                  220 mg           



                                                  Zinc           



                                                  sulfate =           



                                                  50 mg           



                                                  elemental           



                                                  zinc Same           



                                                  as Zinc           



                                                  Sulfate           

 

     ascorbic            No                       Notes:           [facility]



     acid      5-04                               (Same as:           l



               14:00:                               Vitamin C)           [facility]                                                

 

     multivitami            No                       Notes:           [facility]

rajeev



     n         5-04                               (Same           l



               14:00:                               as:Thera)           [facility]



               00                                 WASTE: F/P           



                                                  - Black; E           



                                                  -              



                                                  Municipal           



                                                  Trash Bin           



                                                  Take with           



                                                  food.           

 

     Docusate            No                       Notes:           [facility]



               5-04                               (Same as:           l



               14:00:                               Colace)           [facility]



                                                (Do Not           



                                                  Crush)           

 

     Zinc            No                       Notes:           [facility]



     Sulfate      5-04                               (Zinc           l



               14:00:                               sulfate           [facility]



               00                                 capsule) -           



                                                  220 mg           



                                                  Zinc           



                                                  sulfate =           



                                                  50 mg           



                                                  elemental           



                                                  zinc Same           



                                                  as Zinc           



                                                  Sulfate           

 

     ascorbic            No                       Notes:           [facility]



     acid      5-04                               (Same as:           l



               14:00:                               Vitamin C)           Jose



                                                               

 

     multivitami            No                       Notes:           [facility]

rajeev



     n         5-04                               (Same           l



               14:00:                               as:Thera)           Jose



                                                WASTE: F/P           



                                                  - Black; E           



                                                  -              



                                                  Municipal           



                                                  Trash Bin           



                                                  Take with           



                                                  food.           

 

     Docusate            No                       Notes:           [facility]



               5-04                               (Same as:           l



               14:00:                               Colace)           [facility]



                                                (Do Not           



                                                  Crush)           

 

     Zinc            No                       Notes:           [facility]



     Sulfate      5-04                               (Zinc           l



               14:00:                               sulfate           [facility]



               00                                 capsule) -           



                                                  220 mg           



                                                  Zinc           



                                                  sulfate =           



                                                  50 mg           



                                                  elemental           



                                                  zinc Same           



                                                  as Zinc           



                                                  Sulfate           

 

     ascorbic            No                       Notes:           [facility]



     acid      5-04                               (Same as:           l



               14:00:                               Vitamin C)           [facility]



                                                               

 

     multivitami            No                       Notes:           [facility]

rajeev



     n         5-04                               (Same           l



               14:00:                               as:Thera)           [facility]



                                                WASTE: F/P           



                                                  - Black; E           



                                                  -              



                                                  Municipal           



                                                  Trash Bin           



                                                  Take with           



                                                  food.           

 

     Docusate            No                       Notes:           [facility]



               5-04                               (Same as:           l



               14:00:                               Colace)           [facility]



                                                (Do Not           



                                                  Crush)           

 

     Zinc            No                       Notes:           [facility]



     Sulfate      5-04                               (Zinc           l



               14:00:                               sulfate           [facility]



               00                                 capsule) -           



                                                  220 mg           



                                                  Zinc           



                                                  sulfate =           



                                                  50 mg           



                                                  elemental           



                                                  zinc Same           



                                                  as Zinc           



                                                  Sulfate           

 

     ascorbic            No                       Notes:           [facility]



     acid      5-04                               (Same as:           l



               14:00:                               Vitamin C)           [facility]



                                                               

 

     multivitami            No                       Notes:           [facility]

rajeev



     n         5-04                               (Same           l



               14:00:                               as:Thera)           Jose



                                                WASTE: F/P           



                                                  - Black; E           



                                                  -              



                                                  Municipal           



                                                  Trash Bin           



                                                  Take with           



                                                  food.           

 

     Docusate            No                       Notes:           [facility]



               5-04                               (Same as:           l



               14:00:                               Colace)           [facility]



                                                (Do Not           



                                                  Crush)           

 

     Zinc            No                       Notes:           [facility]



     Sulfate      5-04                               (Zinc           l



               14:00:                               sulfate           [facility]



               00                                 capsule) -           



                                                  220 mg           



                                                  Zinc           



                                                  sulfate =           



                                                  50 mg           



                                                  elemental           



                                                  zinc Same           



                                                  as Zinc           



                                                  Sulfate           

 

     ascorbic            No                       Notes:           [facility]



     acid      5-04                               (Same as:           l



               14:00:                               Vitamin C)           [facility]



                                                               

 

     multivitami            No                       Notes:           [facility]

rajeev



     n         5-04                               (Same           l



               14:00:                               as:Thera)           [facility]                                 WASTE: F/P           



                                                  - Black; E           



                                                  -              



                                                  Municipal           



                                                  Trash Bin           



                                                  Take with           



                                                  food.           

 

     Docusate            No                       Notes:           [facility]



               5-04                               (Same as:           l



               14:00:                               Colace)           [facility]



                                                (Do Not           



                                                  Crush)           

 

     Zinc            No                       Notes:           [facility]



     Sulfate      5-04                               (Zinc           l



               14:00:                               sulfate           [facility]



               00                                 capsule) -           



                                                  220 mg           



                                                  Zinc           



                                                  sulfate =           



                                                  50 mg           



                                                  elemental           



                                                  zinc Same           



                                                  as Zinc           



                                                  Sulfate           

 

     ascorbic            No                       Notes:           [facility]



     acid      5-04                               (Same as:           l



               14:00:                               Vitamin C)           Jose                                                

 

     multivitami            No                       Notes:           [facility]

rajeev



     n         5-04                               (Same           l



               14:00:                               as:Thera)           Jose                                 WASTE: F/P           



                                                  - Black; E           



                                                  -              



                                                  Municipal           



                                                  Trash Bin           



                                                  Take with           



                                                  food.           

 

     Naproxen            No                       Notes:           [facility]



               5-04                               (Same as:           l



               07:00:                               Naprosyn)           [facility]



                                                Take with           



                                                  food.           

 

     Zosyn            No                       Notes:           [facility]



               5-04                               (Same as:           l



               07:00:                               Zosyn)                                            Dosing           



                                                  based on           



                                                  Piperacill           



                                                  in             



                                                  component           



                                                  ***            



                                                  MEDICATION           



                                                  WASTE ***           



                                                  Product           



                                                  Size: 3375           



                                                  mg Product           



                                                  Wasted:           



                                                  ___ mg           

 

     Vancomycin            No                        2001 mg:           Me

[facility]



               5-04                               infuse           l



               07:00:                               over 2.5           [facility]



               00                                 hours For           



                                                  adult           



                                                  patients           



                                                  only:           



                                                  Round to           



                                                  nearest           



                                                  250 mg per           



                                                  Medical           



                                                  Staff           



                                                  approval           



                                                  ***            



                                                  MEDICATION           



                                                  WASTE ***           



                                                  Product           



                                                  Size: 1000           



                                                  mg Product           



                                                  Wasted:           



                                                  ___ mg           

 

     Enoxaparin            No                       Notes:           [facility]

[facility]



               5-04                               (Same as:           l



               07:00:                               Lovenox)           [facility]                                                

 

     Naproxen            No                       Notes:           [facility]



               5-04                               (Same as:           l



               07:00:                               Naprosyn)           [facility]



                                                Take with           



                                                  food.           

 

     Zosyn      2018-0      No                       Notes:           [facility]



               5-04                               (Same as:           l



               07:00:                               Zosyn)           [facility]



               00                                 Dosing           



                                                  based on           



                                                  Piperacill           



                                                  in             



                                                  component           



                                                  ***            



                                                  MEDICATION           



                                                  WASTE ***           



                                                  Product           



                                                  Size: 3375           



                                                  mg Product           



                                                  Wasted:           



                                                  ___ mg           

 

     Vancomycin      2018-0      No                        2001 mg:           Me

[facility]



               5-04                               infuse           l



               07:00:                               over 2.5           [facility]



               00                                 hours For           



                                                  adult           



                                                  patients           



                                                  only:           



                                                  Round to           



                                                  nearest           



                                                  250 mg per           



                                                  Medical           



                                                  Staff           



                                                  approval           



                                                  ***            



                                                  MEDICATION           



                                                  WASTE ***           



                                                  Product           



                                                  Size: 1000           



                                                  mg Product           



                                                  Wasted:           



                                                  ___ mg           

 

     Enoxaparin      2018-0      No                       Notes:           [facility]

ia



               5-04                               (Same as:           l



               07:00:                               Lovenox)           [facility]



               00                                                

 

     Naproxen      -0      No                       Notes:           [facility]



               5-04                               (Same as:           l



               07:00:                               Naprosyn)           [facility]



               00                                 Take with           



                                                  food.           

 

     Zosyn      -0      No                       Notes:           [facility]



               5-04                               (Same as:           l



               07:00:                               Zosyn)           [facility]



               00                                 Dosing           



                                                  based on           



                                                  Piperacill           



                                                  in             



                                                  component           



                                                  ***            



                                                  MEDICATION           



                                                  WASTE ***           



                                                  Product           



                                                  Size: 3375           



                                                  mg Product           



                                                  Wasted:           



                                                  ___ mg           

 

     Vancomycin      2018-0      No                        2001 mg:           Me

[facility]



               5-04                               infuse           l



               07:00:                               over 2.5           [facility]



               00                                 hours For           



                                                  adult           



                                                  patients           



                                                  only:           



                                                  Round to           



                                                  nearest           



                                                  250 mg per           



                                                  Medical           



                                                  Staff           



                                                  approval           



                                                  ***            



                                                  MEDICATION           



                                                  WASTE ***           



                                                  Product           



                                                  Size: 1000           



                                                  mg Product           



                                                  Wasted:           



                                                  ___ mg           

 

     Enoxaparin      2018-0      No                       Notes:           [facility]

[facility]



               5-04                               (Same as:           l



               07:00:                               Lovenox)           [facility]



               00                                                

 

     Naproxen      -0      No                       Notes:           [facility]



               5-04                               (Same as:           l



               07:00:                               Naprosyn)           [facility]



               00                                 Take with           



                                                  food.           

 

     Zosyn      2018-0      No                       Notes:           [facility]



               5-04                               (Same as:           l



               07:00:                               Zosyn)           [facility]



               00                                 Dosing           



                                                  based on           



                                                  Piperacill           



                                                  in             



                                                  component           



                                                  ***            



                                                  MEDICATION           



                                                  WASTE ***           



                                                  Product           



                                                  Size: 3375           



                                                  mg Product           



                                                  Wasted:           



                                                  ___ mg           

 

     Vancomycin      2018-0      No                        2001 mg:           Me

[facility]



               5-04                               infuse           l



               07:00:                               over 2.5           [facility]



               00                                 hours For           



                                                  adult           



                                                  patients           



                                                  only:           



                                                  Round to           



                                                  nearest           



                                                  250 mg per           



                                                  Medical           



                                                  Staff           



                                                  approval           



                                                  ***            



                                                  MEDICATION           



                                                  WASTE ***           



                                                  Product           



                                                  Size: 1000           



                                                  mg Product           



                                                  Wasted:           



                                                  ___ mg           

 

     Enoxaparin      2018-0      No                       Notes:           [facility]

[facility]



               5-04                               (Same as:           l



               07:00:                               Lovenox)           [facility]



               00                                                

 

     Naproxen      -0      No                       Notes:           [facility]



               5-04                               (Same as:           l



               07:00:                               Naprosyn)           [facility]



               00                                 Take with           



                                                  food.           

 

     Zosyn      -0      No                       Notes:           [facility]



               5-04                               (Same as:           l



               07:00:                               Zosyn)           [facility]



               00                                 Dosing           



                                                  based on           



                                                  Piperacill           



                                                  in             



                                                  component           



                                                  ***            



                                                  MEDICATION           



                                                  WASTE ***           



                                                  Product           



                                                  Size: 3375           



                                                  mg Product           



                                                  Wasted:           



                                                  ___ mg           

 

     Vancomycin      2018-0      No                        2001 mg:           Me

[facility]



               5-04                               infuse           l



               07:00:                               over 2.5           [facility]



               00                                 hours For           



                                                  adult           



                                                  patients           



                                                  only:           



                                                  Round to           



                                                  nearest           



                                                  250 mg per           



                                                  Medical           



                                                  Staff           



                                                  approval           



                                                  ***            



                                                  MEDICATION           



                                                  WASTE ***           



                                                  Product           



                                                  Size: 1000           



                                                  mg Product           



                                                  Wasted:           



                                                  ___ mg           

 

     Enoxaparin      2018-0      No                       Notes:           [facility]

[facility]



               5-04                               (Same as:           l



               07:00:                               Lovenox)           [facility]



               00                                                

 

     Naproxen      -0      No                       Notes:           [facility]



               5-04                               (Same as:           l



               07:00:                               Naprosyn)           [facility]



               00                                 Take with           



                                                  food.           

 

     Zosyn      0      No                       Notes:           [facility]



               5-04                               (Same as:           l



               07:00:                               Zosyn)           [facility]



               00                                 Dosing           



                                                  based on           



                                                  Piperacill           



                                                  in             



                                                  component           



                                                  ***            



                                                  MEDICATION           



                                                  WASTE ***           



                                                  Product           



                                                  Size: 3375           



                                                  mg Product           



                                                  Wasted:           



                                                  ___ mg           

 

     Vancomycin      2018-0      No                        2001 mg:           Me

[facility]



               5-04                               infuse           l



               07:00:                               over 2.5           [facility]



               00                                 hours For           



                                                  adult           



                                                  patients           



                                                  only:           



                                                  Round to           



                                                  nearest           



                                                  250 mg per           



                                                  Medical           



                                                  Staff           



                                                  approval           



                                                  ***            



                                                  MEDICATION           



                                                  WASTE ***           



                                                  Product           



                                                  Size: 1000           



                                                  mg Product           



                                                  Wasted:           



                                                  ___ mg           

 

     Enoxaparin      2018-0      No                       Notes:           [facility]

[facility]



               5-04                               (Same as:           l



               07:00:                               Lovenox)           [facility]



               00                                                

 

     Naproxen      -0      No                       Notes:           [facility]



               5-04                               (Same as:           l



               07:00:                               Naprosyn)           [facility]



               00                                 Take with           



                                                  food.           

 

     Zosyn      -0      No                       Notes:           [facility]



               5-04                               (Same as:           l



               07:00:                               Zosyn)           [facility]



               00                                 Dosing           



                                                  based on           



                                                  Piperacill           



                                                  in             



                                                  component           



                                                  ***            



                                                  MEDICATION           



                                                  WASTE ***           



                                                  Product           



                                                  Size: 3375           



                                                  mg Product           



                                                  Wasted:           



                                                  ___ mg           

 

     Vancomycin            No                         mg:           Me

[facility]



               5-04                               infuse           l



               07:00:                               over 2.5           [facility]



               00                                 hours For           



                                                  adult           



                                                  patients           



                                                  only:           



                                                  Round to           



                                                  nearest           



                                                  250 mg per           



                                                  Medical           



                                                  Staff           



                                                  approval           



                                                  ***            



                                                  MEDICATION           



                                                  WASTE ***           



                                                  Product           



                                                  Size: 1000           



                                                  mg Product           



                                                  Wasted:           



                                                  ___ mg           

 

     Enoxaparin            No                       Notes:           [facility]

[facility]



               5-04                               (Same as:           l



               07:00:                               Lovenox)           [facility]



               00                                                

 

     Sodium            No                       1,000 mL,           [facility]

a



     Chloride      5-04                               Rate: 125           l



     0.9% IV      06:46:                               ml/hr,           [facility]



     1,000 mL      00                                 Infuse           



                                                  over: 8           



                                                  hr, Route:           



                                                  IV, Dosing           



                                                  Weight           



                                                  127.27 kg,           



                                                  Total           



                                                  Volume:           



                                                  1,000,           



                                                  Start           



                                                  date:           



                                                  18           



                                                  1:46:00           



                                                  CDT,           



                                                  Duration:           



                                                  30 day,           



                                                  Stop date:           



                                                  18           



                                                  1:45:00           



                                                  CDT, 2.44,           



                                                  m2             

 

     Saline            No                       Notes:           [facility]



     Flush 0.9%      5-04                               (Same as:           l



               06:46:                               BD             [facility]



               00                                 Posiflush)           

 

     Acetaminoph            No                       Notes: Do           [facility]

[facility]



     en        5-04                               not exceed           l



               06:46:                               4 gm/day.           [facility]



               00                                 (Same as:           



                                                  Tylenol)           

 

     Acetaminoph            No                       Notes:           [facility]

[facility]



     en 325 MG /      5-04                               (Same as:           l



     Hydrocodone      06:46:                               [place]co           [facility]

nn



     Bitartrate      00                                 325/5) Do           



     5 MG Oral                                         not exceed           



     Tablet                                         4gm/day of           



                                                  acetaminop           



                                                  hen.           

 

     Sodium            No                       1,000 mL,           [facility]

a



     Chloride      5-04                               Rate: 125           l



     0.9% IV      06:46:                               ml/hr,           [facility]



     1,000 mL      00                                 Infuse           



                                                  over: 8           



                                                  hr, Route:           



                                                  IV, Dosing           



                                                  Weight           



                                                  127.27 kg,           



                                                  Total           



                                                  Volume:           



                                                  1,000,           



                                                  Start           



                                                  date:           



                                                  18           



                                                  1:46:00           



                                                  CDT,           



                                                  Duration:           



                                                  30 day,           



                                                  Stop date:           



                                                  18           



                                                  1:45:00           



                                                  CDT, 2.44,           



                                                  m2             

 

     Saline            No                       Notes:           [facility]



     Flush 0.9%      5-04                               (Same as:           l



               06:46:                               BD             [facility]



               00                                 Posiflush)           

 

     Acetaminoph            No                       Notes: Do           M

[facility]



     en        5-04                               not exceed           l



               06:46:                               4 gm/day.           [facility]



               00                                 (Same as:           



                                                  Tylenol)           

 

     Acetaminoph            No                       Notes:           [facility]

[facility]



     en 325 MG /      5-04                               (Same as:           l



     Hydrocodone      06:46:                               [place]co           [facility]

nn



     Bitartrate      00                                 325/5) Do           



     5 MG Oral                                         not exceed           



     Tablet                                         4gm/day of           



                                                  acetaminop           



                                                  hen.           

 

     Sodium            No                       1,000 mL,           [facility]

a



     Chloride      5-04                               Rate: 125           l



     0.9% IV      06:46:                               ml/hr,           [facility]



     1,000 mL      00                                 Infuse           



                                                  over: 8           



                                                  hr, Route:           



                                                  IV, Dosing           



                                                  Weight           



                                                  127.27 kg,           



                                                  Total           



                                                  Volume:           



                                                  1,000,           



                                                  Start           



                                                  date:           



                                                  18           



                                                  1:46:00           



                                                  CDT,           



                                                  Duration:           



                                                  30 day,           



                                                  Stop date:           



                                                  18           



                                                  1:45:00           



                                                  CDT, 2.44,           



                                                  m2             

 

     Saline            No                       Notes:           [facility]



     Flush 0.9%      5-04                               (Same as:           l



               06:46:                               BD             [facility]



               00                                 Posiflush)           

 

     Acetaminoph            No                       Notes: Do           [facility]

[facility]



     en        5-04                               not exceed           l



               06:46:                               4 gm/day.           [facility]



               00                                 (Same as:           



                                                  Tylenol)           

 

     Acetaminoph            No                       Notes:           [facility]

[facility]



     en 325 MG /      5-04                               (Same as:           l



     Hydrocodone      06:46:                               Norco           [facility]

nn



     Bitartrate      00                                 325/5) Do           



     5 MG Oral                                         not exceed           



     Tablet                                         4gm/day of           



                                                  acetaminop           



                                                  hen.           

 

     Sodium            No                       1,000 mL,           [facility]

a



     Chloride      5-04                               Rate: 125           l



     0.9% IV      06:46:                               ml/hr,           [facility]



     1,000 mL      00                                 Infuse           



                                                  over: 8           



                                                  hr, Route:           



                                                  IV, Dosing           



                                                  Weight           



                                                  127.27 kg,           



                                                  Total           



                                                  Volume:           



                                                  1,000,           



                                                  Start           



                                                  date:           



                                                  18           



                                                  1:46:00           



                                                  CDT,           



                                                  Duration:           



                                                  30 day,           



                                                  Stop date:           



                                                  18           



                                                  1:45:00           



                                                  CDT, 2.44,           



                                                  m2             

 

     Saline            No                       Notes:           [facility]



     Flush 0.9%      5-04                               (Same as:           l



               06:46:                               BD             [facility]



               00                                 Posiflush)           

 

     Acetaminoph            No                       Notes: Do           M

[facility]



     en        5-04                               not exceed           l



               06:46:                               4 gm/day.           [facility]



               00                                 (Same as:           



                                                  Tylenol)           

 

     Acetaminoph            No                       Notes:           [facility]

[facility]



     en 325 MG /      5-04                               (Same as:           l



     Hydrocodone      06:46:                               Norco           [facility]

nn



     Bitartrate      00                                 325/5) Do           



     5 MG Oral                                         not exceed           



     Tablet                                         4gm/day of           



                                                  acetaminop           



                                                  hen.           

 

     Sodium            No                       1,000 mL,           [facility]

a



     Chloride      5-04                               Rate: 125           l



     0.9% IV      06:46:                               ml/hr,           [facility]



     1,000 mL      00                                 Infuse           



                                                  over: 8           



                                                  hr, Route:           



                                                  IV, Dosing           



                                                  Weight           



                                                  127.27 kg,           



                                                  Total           



                                                  Volume:           



                                                  1,000,           



                                                  Start           



                                                  date:           



                                                  18           



                                                  1:46:00           



                                                  CDT,           



                                                  Duration:           



                                                  30 day,           



                                                  Stop date:           



                                                  18           



                                                  1:45:00           



                                                  CDT, 2.44,           



                                                  m2             

 

     Saline            No                       Notes:           [facility]



     Flush 0.9%      5-04                               (Same as:           l



               06:46:                               BD             [facility]



               00                                 Posiflush)           

 

     Acetaminoph            No                       Notes: Do           M

[facility]



     en        5-04                               not exceed           l



               06:46:                               4 gm/day.           [facility]



               00                                 (Same as:           



                                                  Tylenol)           

 

     Acetaminoph            No                       Notes:           [facility]

[facility]



     en 325 MG /      5-04                               (Same as:           l



     Hydrocodone      06:46:                               Norco           [facility]

nn



     Bitartrate      00                                 325/5) Do           



     5 MG Oral                                         not exceed           



     Tablet                                         4gm/day of           



                                                  acetaminop           



                                                  hen.           

 

     Sodium            No                       1,000 mL,           [facility]

a



     Chloride      5-04                               Rate: 125           l



     0.9% IV      06:46:                               ml/hr,           [facility]



     1,000 mL      00                                 Infuse           



                                                  over: 8           



                                                  hr, Route:           



                                                  IV, Dosing           



                                                  Weight           



                                                  127.27 kg,           



                                                  Total           



                                                  Volume:           



                                                  1,000,           



                                                  Start           



                                                  date:           



                                                  18           



                                                  1:46:00           



                                                  CDT,           



                                                  Duration:           



                                                  30 day,           



                                                  Stop date:           



                                                  18           



                                                  1:45:00           



                                                  CDT, 2.44,           



                                                  m2             

 

     Saline            No                       Notes:           [facility]



     Flush 0.9%      5-04                               (Same as:           l



               06:46:                               BD             [facility]



               00                                 Posiflush)           

 

     Acetaminoph            No                       Notes: Do           M

[facility]



     en        5-04                               not exceed           l



               06:46:                               4 gm/day.           [facility]



               00                                 (Same as:           



                                                  Tylenol)           

 

     Acetaminoph            No                       Notes:           [facility]

[facility]



     en 325 MG /      5-04                               (Same as:           l



     Hydrocodone      06:46:                               Norco           [facility]

nn



     Bitartrate      00                                 325/5) Do           



     5 MG Oral                                         not exceed           



     Tablet                                         4gm/day of           



                                                  acetaminop           



                                                  hen.           

 

     Sodium            No                       1,000 mL,           [facility]

a



     Chloride      -                               Rate: 125           l



     0.9% IV      06:46:                               ml/hr,           [facility]



     1,000 mL      00                                 Infuse           



                                                  over: 8           



                                                  hr, Route:           



                                                  IV, Dosing           



                                                  Weight           



                                                  127.27 kg,           



                                                  Total           



                                                  Volume:           



                                                  1,000,           



                                                  Start           



                                                  date:           



                                                  18           



                                                  1:46:00           



                                                  CDT,           



                                                  Duration:           



                                                  30 day,           



                                                  Stop date:           



                                                  18           



                                                  1:45:00           



                                                  CDT, 2.44,           



                                                  m2             

 

     Saline            No                       Notes:           [facility]



     Flush 0.9%      -                               (Same as:           l



               06:46:                               BD             [facility]



               00                                 Posiflush)           

 

     Acetaminoph            No                       Notes: Do           M

[facility]



     en        5-04                               not exceed           l



               06:46:                               4 gm/day.           [facility]



                                                (Same as:           



                                                  Tylenol)           

 

     Acetaminoph            No                       Notes:           [facility]

[facility]



     en 325 MG /      5-04                               (Same as:           l



     Hydrocodone      06:46:                               Norco           [facility]

nn



     Bitartrate      00                                 325/5) Do           



     5 MG Oral                                         not exceed           



     Tablet                                         4gm/day of           



                                                  acetaminop           



                                                  hen.           

 

     Reglan            No                       Notes:           [facility]



               5-04                               (Same as:           l



               04:27:                               Reglan)           [facility]



                                                               

 

     Benadryl            No                       Notes:           [facility]



               5-04                               (Same as:           l



               04:27:                               Benadryl)           [facility]



                                                               

 

     Reglan            No                       Notes:           [facility]



               5-04                               (Same as:           l



               04:27:                               Reglan)           [facility]



                                                               

 

     Benadryl            No                       Notes:           [facility]



               5-04                               (Same as:           l



               04:27:                               Benadryl)           [facility]



                                                               

 

     Reglan            No                       Notes:           [facility]



               5-04                               (Same as:           l



               04:27:                               Reglan)           [facility]



                                                               

 

     Benadryl            No                       Notes:           [facility]



               5-04                               (Same as:           l



               04:27:                               Benadryl)           [facility]



                                                               

 

     Reglan            No                       Notes:           [facility]



               5-04                               (Same as:           l



               04:27:                               Reglan)           [facility]



                                                               

 

     Benadryl            No                       Notes:           [facility]



               5-04                               (Same as:           l



               04:27:                               Benadryl)           [facility]



                                                               

 

     Reglan            No                       Notes:           [facility]



               5-04                               (Same as:           l



               04:27:                               Reglan)           [facility]



                                                               

 

     Benadryl            No                       Notes:           [facility]



               5-04                               (Same as:           l



               04:27:                               Benadryl)           [facility]



                                                               

 

     Reglan            No                       Notes:           [facility]



               5-04                               (Same as:           l



               04:27:                               Reglan)           [facility]



                                                               

 

     Benadryl            No                       Notes:           [facility]



               5-04                               (Same as:           l



               04:27:                               Benadryl)           [facility]



                                                               

 

     Reglan            No                       Notes:           [facility]



               5-04                               (Same as:           l



               04:27:                               Reglan)           [facility]



                                                               

 

     Benadryl            No                       Notes:           [facility]



               5-04                               (Same as:           l



               04:27:                               Benadryl)           [facility]



                                                               

 

     Magnesium            No                       Notes:           [facility]

a



     Sulfate      5-04                               WASTE: F/P           l



               04:26:                               - Sink; E           [facility]



                                                -              



                                                  Municipal           



                                                  Trash Bin           

 

     Sodium            No                       1,000 mL,           [facility]

a



     Chloride      5-04                               1000           l



     0.9%      04:26:                               ml/hr,           [facility]



     (Bolus) IV      00                                 Infuse           



                                                  Over: 1           



                                                  hr, Route:           



                                                  IV, 1,000,           



                                                  Drug form:           



                                                  INJ, ONCE,           



                                                  Priority:           



                                                  STAT,           



                                                  Dosing           



                                                  Weight           



                                                  127.273           



                                                  kg, Start           



                                                  date:           



                                                  18           



                                                  23:26:00           



                                                  CDT, Stop           



                                                  date:           



                                                  18           



                                                  23:26:00           



                                                  CDT            

 

     Magnesium            No                       Notes:           [facility]

a



     Sulfate      5-04                               WASTE: F/P           l



               04:26:                               - Sink; E           [facility]



                                                -              



                                                  Municipal           



                                                  Trash Bin           

 

     Sodium            No                       1,000 mL,           [facility]

a



     Chloride      5-04                               1000           l



     0.9%      04:26:                               ml/hr,           [facility]



     (Bolus) IV      00                                 Infuse           



                                                  Over: 1           



                                                  hr, Route:           



                                                  IV, 1,000,           



                                                  Drug form:           



                                                  INJ, ONCE,           



                                                  Priority:           



                                                  STAT,           



                                                  Dosing           



                                                  Weight           



                                                  127.273           



                                                  kg, Start           



                                                  date:           



                                                  18           



                                                  23:26:00           



                                                  CDT, Stop           



                                                  date:           



                                                  18           



                                                  23:26:00           



                                                  CDT            

 

     Magnesium      2018-0      No                       Notes:           [facility]

a



     Sulfate      5-04                               WASTE: F/P           l



               04:26:                               - Sink; E           [facility]



                                                -              



                                                  Municipal           



                                                  Trash Bin           

 

     Sodium      2018-0      No                       1,000 mL,           [facility]

a



     Chloride      5-04                               1000           l



     0.9%      04:26:                               ml/hr,           [facility]



     (Bolus) IV      00                                 Infuse           



                                                  Over: 1           



                                                  hr, Route:           



                                                  IV, 1,000,           



                                                  Drug form:           



                                                  INJ, ONCE,           



                                                  Priority:           



                                                  STAT,           



                                                  Dosing           



                                                  Weight           



                                                  127.273           



                                                  kg, Start           



                                                  date:           



                                                  18           



                                                  23:26:00           



                                                  CDT, Stop           



                                                  date:           



                                                  18           



                                                  23:26:00           



                                                  CDT            

 

     Magnesium      2018-0      No                       Notes:           [facility]

a



     Sulfate      5-04                               WASTE: F/P           l



               04:26:                               - Sink; E           [facility]



                                                -              



                                                  Municipal           



                                                  Trash Bin           

 

     Sodium      2018-0      No                       1,000 mL,           [facility]

a



     Chloride      5-04                               1000           l



     0.9%      04:26:                               ml/hr,           [facility]



     (Bolus) IV      00                                 Infuse           



                                                  Over: 1           



                                                  hr, Route:           



                                                  IV, 1,000,           



                                                  Drug form:           



                                                  INJ, ONCE,           



                                                  Priority:           



                                                  STAT,           



                                                  Dosing           



                                                  Weight           



                                                  127.273           



                                                  kg, Start           



                                                  date:           



                                                  18           



                                                  23:26:00           



                                                  CDT, Stop           



                                                  date:           



                                                  18           



                                                  23:26:00           



                                                  CDT            

 

     Magnesium      2018-0      No                       Notes:           [facility]

a



     Sulfate      5-04                               WASTE: F/P           l



               04:26:                               - Sink; E           [facility]



                                                -              



                                                  Municipal           



                                                  Trash Bin           

 

     Sodium      2018-0      No                       1,000 mL,           [facility]

a



     Chloride      5-04                               1000           l



     0.9%      04:26:                               ml/hr,           [facility]



     (Bolus) IV      00                                 Infuse           



                                                  Over: 1           



                                                  hr, Route:           



                                                  IV, 1,000,           



                                                  Drug form:           



                                                  INJ, ONCE,           



                                                  Priority:           



                                                  STAT,           



                                                  Dosing           



                                                  Weight           



                                                  127.273           



                                                  kg, Start           



                                                  date:           



                                                  18           



                                                  23:26:00           



                                                  CDT, Stop           



                                                  date:           



                                                  18           



                                                  23:26:00           



                                                  CDT            

 

     Magnesium      2018-0      No                       Notes:           [facility]

a



     Sulfate      5-04                               WASTE: F/P           l



               04:26:                               - Sink; E           [facility]



                                                -              



                                                  Municipal           



                                                  Trash Bin           

 

     Sodium      2018-0      No                       1,000 mL,           [facility]

a



     Chloride      5-04                               1000           l



     0.9%      04:26:                               ml/hr,           [facility]



     (Bolus) IV      00                                 Infuse           



                                                  Over: 1           



                                                  hr, Route:           



                                                  IV, 1,000,           



                                                  Drug form:           



                                                  INJ, ONCE,           



                                                  Priority:           



                                                  STAT,           



                                                  Dosing           



                                                  Weight           



                                                  127.273           



                                                  kg, Start           



                                                  date:           



                                                  18           



                                                  23:26:00           



                                                  CDT, Stop           



                                                  date:           



                                                  18           



                                                  23:26:00           



                                                  CDT            

 

     Magnesium      -0      No                       Notes:           [facility]

a



     Sulfate                                     WASTE: F/P           l



               04:26:                               - Sink; E           [facility]



               00                                 -              



                                                  Municipal           



                                                  Trash Bin           

 

     Sodium      2018-0      No                       1,000 mL,           [facility]

a



     Chloride                                     1000           l



     0.9%      04:26:                               ml/hr,           [facility]



     (Bolus) IV      00                                 Infuse           



                                                  Over: 1           



                                                  hr, Route:           



                                                  IV, 1,000,           



                                                  Drug form:           



                                                  INJ, ONCE,           



                                                  Priority:           



                                                  STAT,           



                                                  Dosing           



                                                  Weight           



                                                  127.273           



                                                  kg, Start           



                                                  date:           



                                                  18           



                                                  23:26:00           



                                                  CDT, Stop           



                                                  date:           



                                                  18           



                                                  23:26:00           



                                                  CDT            

 

     Zosyn      2018-0      No                       4.5 gm,           [facility]



                                              Route:           l



               04:10:                               IVPB,           [facility]



               00                                 ONCE,           



                                                  Dosing           



                                                  Weight           



                                                  127.273,           



                                                  kg,            



                                                  Priority:           



                                                  STAT,           



                                                  Start           



                                                  date:           



                                                  18           



                                                  23:10:00           



                                                  CDT, Stop           



                                                  date:           



                                                  18           



                                                  23:10:00           



                                                  CDT, ABX           



                                                  Indication           



                                                  :              



                                                  Bacteremia           

 

     Vancomycin      -0      No                         mg:           [facility]

[facility]



               -04                               infuse           l



               04:10:                               over 2.5           [facility]



               00                                 hours For           



                                                  adult           



                                                  patients           



                                                  only:           



                                                  Round to           



                                                  nearest           



                                                  250 mg per           



                                                  Medical           



                                                  Staff           



                                                  approval           



                                                  ***            



                                                  MEDICATION           



                                                  WASTE ***           



                                                  Product           



                                                  Size: 1000           



                                                  mg Product           



                                                  Wasted:           



                                                  ___ mg           

 

     Zosyn      2018-0      No                       4.5 gm,           [facility]



                                              Route:           l



               04:10:                               IVPB,           [facility]



               00                                 ONCE,           



                                                  Dosing           



                                                  Weight           



                                                  127.273,           



                                                  kg,            



                                                  Priority:           



                                                  STAT,           



                                                  Start           



                                                  date:           



                                                  18           



                                                  23:10:00           



                                                  CDT, Stop           



                                                  date:           



                                                  18           



                                                  23:10:00           



                                                  CDT, ABX           



                                                  Indication           



                                                  :              



                                                  Bacteremia           

 

     Vancomycin      2018-0      No                         mg:           [facility]

[facility]



               5-04                               infuse           l



               04:10:                               over 2.5           [facility]



               00                                 hours For           



                                                  adult           



                                                  patients           



                                                  only:           



                                                  Round to           



                                                  nearest           



                                                  250 mg per           



                                                  Medical           



                                                  Staff           



                                                  approval           



                                                  ***            



                                                  MEDICATION           



                                                  WASTE ***           



                                                  Product           



                                                  Size: 1000           



                                                  mg Product           



                                                  Wasted:           



                                                  ___ mg           

 

     Zosyn      2018-0      No                       4.5 gm,           [facility]



               5-04                               Route:           l



               04:10:                               IVPB,           [facility]



               00                                 ONCE,           



                                                  Dosing           



                                                  Weight           



                                                  127.273,           



                                                  kg,            



                                                  Priority:           



                                                  STAT,           



                                                  Start           



                                                  date:           



                                                  18           



                                                  23:10:00           



                                                  CDT, Stop           



                                                  date:           



                                                  18           



                                                  23:10:00           



                                                  CDT, ABX           



                                                  Indication           



                                                  :              



                                                  Bacteremia           

 

     Vancomycin      2018-0      No                         mg:           [facility]

[facility]



               5-04                               infuse           l



               04:10:                               over 2.5           [facility]



               00                                 hours For           



                                                  adult           



                                                  patients           



                                                  only:           



                                                  Round to           



                                                  nearest           



                                                  250 mg per           



                                                  Medical           



                                                  Staff           



                                                  approval           



                                                  ***            



                                                  MEDICATION           



                                                  WASTE ***           



                                                  Product           



                                                  Size: 1000           



                                                  mg Product           



                                                  Wasted:           



                                                  ___ mg           

 

     Zosyn      2018-0      No                       4.5 gm,           [facility]



               5-                               Route:           l



               04:10:                               IVPB,           [facility]



               00                                 ONCE,           



                                                  Dosing           



                                                  Weight           



                                                  127.273,           



                                                  kg,            



                                                  Priority:           



                                                  STAT,           



                                                  Start           



                                                  date:           



                                                  18           



                                                  23:10:00           



                                                  CDT, Stop           



                                                  date:           



                                                  18           



                                                  23:10:00           



                                                  CDT, ABX           



                                                  Indication           



                                                  :              



                                                  Bacteremia           

 

     Vancomycin      2018-0      No                         mg:           [facility]

[facility]



               5-04                               infuse           l



               04:10:                               over 2.5           [facility]



               00                                 hours For           



                                                  adult           



                                                  patients           



                                                  only:           



                                                  Round to           



                                                  nearest           



                                                  250 mg per           



                                                  Medical           



                                                  Staff           



                                                  approval           



                                                  ***            



                                                  MEDICATION           



                                                  WASTE ***           



                                                  Product           



                                                  Size: 1000           



                                                  mg Product           



                                                  Wasted:           



                                                  ___ mg           

 

     Zosyn      2018-0      No                       4.5 gm,           [facility]



               5-                               Route:           l



               04:10:                               IVPB,           [facility]



               00                                 ONCE,           



                                                  Dosing           



                                                  Weight           



                                                  127.273,           



                                                  kg,            



                                                  Priority:           



                                                  STAT,           



                                                  Start           



                                                  date:           



                                                  18           



                                                  23:10:00           



                                                  CDT, Stop           



                                                  date:           



                                                  18           



                                                  23:10:00           



                                                  CDT, ABX           



                                                  Indication           



                                                  :              



                                                  Bacteremia           

 

     Vancomycin      2018-0      No                         mg:           [facility]

[facility]



               5-04                               infuse           l



               04:10:                               over 2.5           [facility]



               00                                 hours For           



                                                  adult           



                                                  patients           



                                                  only:           



                                                  Round to           



                                                  nearest           



                                                  250 mg per           



                                                  Medical           



                                                  Staff           



                                                  approval           



                                                  ***            



                                                  MEDICATION           



                                                  WASTE ***           



                                                  Product           



                                                  Size: 1000           



                                                  mg Product           



                                                  Wasted:           



                                                  ___ mg           

 

     Zosyn      2018-0      No                       4.5 gm,           [facility]



               5-04                               Route:           l



               04:10:                               IVPB,           [facility]



               00                                 ONCE,           



                                                  Dosing           



                                                  Weight           



                                                  127.273,           



                                                  kg,            



                                                  Priority:           



                                                  STAT,           



                                                  Start           



                                                  date:           



                                                  18           



                                                  23:10:00           



                                                  CDT, Stop           



                                                  date:           



                                                  18           



                                                  23:10:00           



                                                  CDT, ABX           



                                                  Indication           



                                                  :              



                                                  Bacteremia           

 

     Vancomycin      2018-0      No                         mg:           Me

[facility]



               5-04                               infuse           l



               04:10:                               over 2.5           [facility]



               00                                 hours For           



                                                  adult           



                                                  patients           



                                                  only:           



                                                  Round to           



                                                  nearest           



                                                  250 mg per           



                                                  Medical           



                                                  Staff           



                                                  approval           



                                                  ***            



                                                  MEDICATION           



                                                  WASTE ***           



                                                  Product           



                                                  Size: 1000           



                                                  mg Product           



                                                  Wasted:           



                                                  ___ mg           

 

     Zosyn      2018-0      No                       4.5 gm,           [facility]



               5-04                               Route:           l



               04:10:                               IVPB,           [facility]



               00                                 ONCE,           



                                                  Dosing           



                                                  Weight           



                                                  127.273,           



                                                  kg,            



                                                  Priority:           



                                                  STAT,           



                                                  Start           



                                                  date:           



                                                  18           



                                                  23:10:00           



                                                  CDT, Stop           



                                                  date:           



                                                  18           



                                                  23:10:00           



                                                  CDT, ABX           



                                                  Indication           



                                                  :              



                                                  Bacteremia           

 

     Vancomycin      2018-0      No                         mg:           Me

[facility]



               5-04                               infuse           l



               04:10:                               over 2.5           [facility]



               00                                 hours For           



                                                  adult           



                                                  patients           



                                                  only:           



                                                  Round to           



                                                  nearest           



                                                  250 mg per           



                                                  Medical           



                                                  Staff           



                                                  approval           



                                                  ***            



                                                  MEDICATION           



                                                  WASTE ***           



                                                  Product           



                                                  Size: 1000           



                                                  mg Product           



                                                  Wasted:           



                                                  ___ mg           

 

     normal      2018-0      No                       1,000 mL,           [facility]

a



     saline 0.9%      5-04                               Rate: 75           l



     IV 1,000 mL      03:39:                               ml/hr,           [facility]

[facility]



               00                                 Infuse           



                                                  over: 13.3           



                                                  hr, Route:           



                                                  IV, Dosing           



                                                  Weight           



                                                  127.273           



                                                  kg, Total           



                                                  Volume:           



                                                  1,000,           



                                                  Start           



                                                  date:           



                                                  18           



                                                  22:39:00           



                                                  CDT,           



                                                  Duration:           



                                                  30 day,           



                                                  Stop date:           



                                                  18           



                                                  22:38:00           



                                                  CDT, 2.36,           



                                                  m2             

 

     normal      2018-0      No                       1,000 mL,           [facility]

a



     saline 0.9%      5-04                               Rate: 75           l



     IV 1,000 mL      03:39:                               ml/hr,           [facility]

[facility]



               00                                 Infuse           



                                                  over: 13.3           



                                                  hr, Route:           



                                                  IV, Dosing           



                                                  Weight           



                                                  127.273           



                                                  kg, Total           



                                                  Volume:           



                                                  1,000,           



                                                  Start           



                                                  date:           



                                                  18           



                                                  22:39:00           



                                                  CDT,           



                                                  Duration:           



                                                  30 day,           



                                                  Stop date:           



                                                  18           



                                                  22:38:00           



                                                  CDT, 2.36,           



                                                  m2             

 

     normal      2018-0      No                       1,000 mL,           [facility]

[facility]



     saline 0.9%      5-04                               Rate: 75           l



     IV 1,000 mL      03:39:                               ml/hr,           [facility]

[facility]



               00                                 Infuse           



                                                  over: 13.3           



                                                  hr, Route:           



                                                  IV, Dosing           



                                                  Weight           



                                                  127.273           



                                                  kg, Total           



                                                  Volume:           



                                                  1,000,           



                                                  Start           



                                                  date:           



                                                  18           



                                                  22:39:00           



                                                  CDT,           



                                                  Duration:           



                                                  30 day,           



                                                  Stop date:           



                                                  18           



                                                  22:38:00           



                                                  CDT, 2.36,           



                                                  m2             

 

     normal      2018-0      No                       1,000 mL,           [facility]

[facility]



     saline 0.9%      5-04                               Rate: 75           l



     IV 1,000 mL      03:39:                               ml/hr,           [facility]

[facility]



               00                                 Infuse           



                                                  over: 13.3           



                                                  hr, Route:           



                                                  IV, Dosing           



                                                  Weight           



                                                  127.273           



                                                  kg, Total           



                                                  Volume:           



                                                  1,000,           



                                                  Start           



                                                  date:           



                                                  18           



                                                  22:39:00           



                                                  CDT,           



                                                  Duration:           



                                                  30 day,           



                                                  Stop date:           



                                                  18           



                                                  22:38:00           



                                                  CDT, 2.36,           



                                                  m2             

 

     normal      2018-0      No                       1,000 mL,           [facility]

[facility]



     saline 0.9%      5-04                               Rate: 75           l



     IV 1,000 mL      03:39:                               ml/hr,           [facility]

[facility]



               00                                 Infuse           



                                                  over: 13.3           



                                                  hr, Route:           



                                                  IV, Dosing           



                                                  Weight           



                                                  127.273           



                                                  kg, Total           



                                                  Volume:           



                                                  1,000,           



                                                  Start           



                                                  date:           



                                                  18           



                                                  22:39:00           



                                                  CDT,           



                                                  Duration:           



                                                  30 day,           



                                                  Stop date:           



                                                  18           



                                                  22:38:00           



                                                  CDT, 2.36,           



                                                  m2             

 

     normal      2018-0      No                       1,000 mL,           [facility]

a



     saline 0.9%      5-04                               Rate: 75           l



     IV 1,000 mL      03:39:                               ml/hr,           [facility]

[facility]



               00                                 Infuse           



                                                  over: 13.3           



                                                  hr, Route:           



                                                  IV, Dosing           



                                                  Weight           



                                                  127.273           



                                                  kg, Total           



                                                  Volume:           



                                                  1,000,           



                                                  Start           



                                                  date:           



                                                  18           



                                                  22:39:00           



                                                  CDT,           



                                                  Duration:           



                                                  30 day,           



                                                  Stop date:           



                                                  18           



                                                  22:38:00           



                                                  CDT, 2.36,           



                                                  m2             

 

     normal      2018-0      No                       1,000 mL,           [facility]

[facility]



     saline 0.9%      5-04                               Rate: 75           l



     IV 1,000 mL      03:39:                               ml/hr,           [facility]

[facility]



               00                                 Infuse           



                                                  over: 13.3           



                                                  hr, Route:           



                                                  IV, Dosing           



                                                  Weight           



                                                  127.273           



                                                  kg, Total           



                                                  Volume:           



                                                  1,000,           



                                                  Start           



                                                  date:           



                                                  18           



                                                  22:39:00           



                                                  CDT,           



                                                  Duration:           



                                                  30 day,           



                                                  Stop date:           



                                                  18           



                                                  22:38:00           



                                                  CDT, 2.36,           



                                                  m2             

 

     Acetaminoph      2018-0      No                       Notes: Max           

[facility]



     en        5-04                               acetaminop           l



               02:56:                               hen 4000           [facility]



               00                                 mg/day (4           



                                                  gm/day).           



                                                  (Same as:           



                                                  Tylenol           



                                                  Extra           



                                                  Strength)           

 

     Acetaminoph            No                       Notes: Max           

[facility]



     en        5-04                               acetaminop           l



               02:56:                               hen 4000           [facility]



               00                                 mg/day (4           



                                                  gm/day).           



                                                  (Same as:           



                                                  Tylenol           



                                                  Extra           



                                                  Strength)           

 

     Acetaminoph            No                       Notes: Max           

[facility]



     en        5-04                               acetaminop           l



               02:56:                               hen 4000           [facility]



               00                                 mg/day (4           



                                                  gm/day).           



                                                  (Same as:           



                                                  Tylenol           



                                                  Extra           



                                                  Strength)           

 

     Acetaminoph            No                       Notes: Max           

[facility]



     en        5-04                               acetaminop           l



               02:56:                               hen 4000           [facility]



               00                                 mg/day (4           



                                                  gm/day).           



                                                  (Same as:           



                                                  Tylenol           



                                                  Extra           



                                                  Strength)           

 

     Acetaminoph            No                       Notes: Max           

[facility]



     en        5-04                               acetaminop           l



               02:56:                               hen 4000           [facility]



               00                                 mg/day (4           



                                                  gm/day).           



                                                  (Same as:           



                                                  Tylenol           



                                                  Extra           



                                                  Strength)           

 

     Acetaminoph            No                       Notes: Max           

[facility]



     en        5-04                               acetaminop           l



               02:56:                               hen 4000           [facility]



               00                                 mg/day (4           



                                                  gm/day).           



                                                  (Same as:           



                                                  Tylenol           



                                                  Extra           



                                                  Strength)           

 

     Acetaminoph            No                       Notes: Max           

[facility]



     en        5-04                               acetaminop           l



               02:56:                               hen 4000           [facility]



               00                                 mg/day (4           



                                                  gm/day).           



                                                  (Same as:           



                                                  Tylenol           



                                                  Extra           



                                                  Strength)           

 

     Rocephin            No                       Notes:           [facility]



                                              (Same As:           l



               02:21:                               Rocephin).           [facility]



               00                                 ***            



                                                  MEDICATION           



                                                  WASTE ***           



                                                  Product           



                                                  Size: 1000           



                                                  mg Product           



                                                  Wasted:           



                                                  _0__ mg           

 

     Rocephin            No                       Notes:           [facility]



                                              (Same As:           l



               02:21:                               Rocephin).           [facility]



               00                                 ***            



                                                  MEDICATION           



                                                  WASTE ***           



                                                  Product           



                                                  Size: 1000           



                                                  mg Product           



                                                  Wasted:           



                                                  _0__ mg           

 

     Rocephin            No                       Notes:           [facility]



                                              (Same As:           l



               02:21:                               Rocephin).           [facility]



               00                                 ***            



                                                  MEDICATION           



                                                  WASTE ***           



                                                  Product           



                                                  Size: 1000           



                                                  mg Product           



                                                  Wasted:           



                                                  _0__ mg           

 

     Rocephin            No                       Notes:           [facility]



               5-04                               (Same As:           l



               02:21:                               Rocephin).           [facility]



               00                                 ***            



                                                  MEDICATION           



                                                  WASTE ***           



                                                  Product           



                                                  Size: 1000           



                                                  mg Product           



                                                  Wasted:           



                                                  _0__ mg           

 

     Rocephin      2018-0      No                       Notes:           [facility]



               5-04                               (Same As:           l



               02:21:                               Rocephin).           [facility]



               00                                 ***            



                                                  MEDICATION           



                                                  WASTE ***           



                                                  Product           



                                                  Size: 1000           



                                                  mg Product           



                                                  Wasted:           



                                                  _0__ mg           

 

     Rocephin      2018-0      No                       Notes:           [facility]



               5-                               (Same As:           l



               02:21:                               Rocephin).           [facility]



               00                                 ***            



                                                  MEDICATION           



                                                  WASTE ***           



                                                  Product           



                                                  Size: 1000           



                                                  mg Product           



                                                  Wasted:           



                                                  _0__ mg           

 

     Rocephin      2018-0      No                       Notes:           [facility]



               5-04                               (Same As:           l



               02:21:                               Rocephin).           [facility]



               00                                 ***            



                                                  MEDICATION           



                                                  WASTE ***           



                                                  Product           



                                                  Size: 1000           



                                                  mg Product           



                                                  Wasted:           



                                                  _0__ mg           

 

     Morphine      2018-0      No                       4 mg,           [facility]



               5-04                               Route:           l



               00:05:                               IVP, ONCE,           [facility]



                                                Dosing           



                                                  Weight           



                                                  127.273,           



                                                  kg,            



                                                  Priority:           



                                                  STAT,           



                                                  Start           



                                                  date:           



                                                  18           



                                                  19:05:00           



                                                  CDT, Stop           



                                                  date:           



                                                  18           



                                                  19:05:00           



                                                  CDT            

 

     Morphine      2018-0      No                       4 mg,           [facility]



               5-04                               Route:           l



               00:05:                               IVP, ONCE,           [facility]



                                                Dosing           



                                                  Weight           



                                                  127.273,           



                                                  kg,            



                                                  Priority:           



                                                  STAT,           



                                                  Start           



                                                  date:           



                                                  18           



                                                  19:05:00           



                                                  CDT, Stop           



                                                  date:           



                                                  18           



                                                  19:05:00           



                                                  CDT            

 

     Morphine      2018-0      No                       4 mg,           [facility]



               5-04                               Route:           l



               00:05:                               IVP, ONCE,           [facility]



                                                Dosing           



                                                  Weight           



                                                  127.273,           



                                                  kg,            



                                                  Priority:           



                                                  STAT,           



                                                  Start           



                                                  date:           



                                                  18           



                                                  19:05:00           



                                                  CDT, Stop           



                                                  date:           



                                                  18           



                                                  19:05:00           



                                                  CDT            

 

     Morphine      2018-0      No                       4 mg,           [facility]



               5-04                               Route:           l



               00:05:                               IVP, ONCE,           [facility]



                                                Dosing           



                                                  Weight           



                                                  127.273,           



                                                  kg,            



                                                  Priority:           



                                                  STAT,           



                                                  Start           



                                                  date:           



                                                  18           



                                                  19:05:00           



                                                  CDT, Stop           



                                                  date:           



                                                  18           



                                                  19:05:00           



                                                  CDT            

 

     Morphine      2018-0      No                       4 mg,           [facility]



               5-04                               Route:           l



               00:05:                               IVP, ONCE,                                            Dosing           



                                                  Weight           



                                                  127.273,           



                                                  kg,            



                                                  Priority:           



                                                  STAT,           



                                                  Start           



                                                  date:           



                                                  18           



                                                  19:05:00           



                                                  CDT, Stop           



                                                  date:           



                                                  18           



                                                  19:05:00           



                                                  CDT            

 

     Morphine      -0      No                       4 mg,           [facility]



               5-04                               Route:           l



               00:05:                               IVP, ONCE,                                            Dosing           



                                                  Weight           



                                                  127.273,           



                                                  kg,            



                                                  Priority:           



                                                  STAT,           



                                                  Start           



                                                  date:           



                                                  18           



                                                  19:05:00           



                                                  CDT, Stop           



                                                  date:           



                                                  18           



                                                  19:05:00           



                                                  CDT            

 

     Morphine      -0      No                       4 mg,           [facility]



               5-04                               Route:           l



               00:05:                               IVP, ONCE,                                            Dosing           



                                                  Weight           



                                                  127.273,           



                                                  kg,            



                                                  Priority:           



                                                  STAT,           



                                                  Start           



                                                  date:           



                                                  18           



                                                  19:05:00           



                                                  CDT, Stop           



                                                  date:           



                                                  18           



                                                  19:05:00           



                                                  CDT            

 

     Benadryl            No                       Notes:           [facility]



               5-03                               (Same as:           l



               23:14:                               Benadryl)                                                           

 

     Benadryl            No                       Notes:           [facility]



               5-03                               (Same as:           l



               23:14:                               Benadryl)                                                           

 

     Benadryl            No                       Notes:           [facility]



               5-03                               (Same as:           l



               23:14:                               Benadryl)                                                           

 

     Benadryl            No                       Notes:           [facility]



               5-03                               (Same as:           l



               23:14:                               Benadryl)                                                           

 

     Benadryl            No                       Notes:           [facility]



               5-03                               (Same as:           l



               23:14:                               Benadryl)           [facility]



                                                               

 

     Benadryl            No                       Notes:           [facility]



               5-03                               (Same as:           l



               23:14:                               Benadryl)                                                           

 

     Benadryl            No                       Notes:           [facility]



               5-03                               (Same as:           l



               23:14:                               Benadryl)                                                           

 

     Benadryl            No                       Notes:           [facility]



               5-03                               (Same as:           l



               22:56:                               Benadryl)                                                           

 

     Morphine      0      No                       4 mg, 1           [facility]

a



               5-03                               mL, Route:           l



               22:56:                               IVP, Drug                                            form:           



                                                  SOLN,           



                                                  ONCE,           



                                                  Dosing           



                                                  Weight           



                                                  127.273,           



                                                  kg,            



                                                  Priority:           



                                                  STAT,           



                                                  Start           



                                                  date:           



                                                  18           



                                                  17:56:00           



                                                  CDT, Stop           



                                                  date:           



                                                  18           



                                                  17:56:00           



                                                  CDT            

 

     Benadryl      2018-0      No                       Notes:           [facility]



               5-03                               (Same as:           l



               22:56:                               Benadryl)                                                           

 

     Morphine      2018-0      No                       4 mg, 1           [facility]

a



               5-03                               mL, Route:           l



               22:56:                               IVP, Drug           [facility]                                 form:           



                                                  SOLN,           



                                                  ONCE,           



                                                  Dosing           



                                                  Weight           



                                                  127.273,           



                                                  kg,            



                                                  Priority:           



                                                  STAT,           



                                                  Start           



                                                  date:           



                                                  18           



                                                  17:56:00           



                                                  CDT, Stop           



                                                  date:           



                                                  18           



                                                  17:56:00           



                                                  CDT            

 

     Benadryl      2018-0      No                       Notes:           [facility]



               5-03                               (Same as:           l



               22:56:                               Benadryl)                                                           

 

     Morphine      2018-0      No                       4 mg, 1           [facility]

a



               5-03                               mL, Route:           l



               22:56:                               IVP, Drug           [facility]                                 form:           



                                                  SOLN,           



                                                  ONCE,           



                                                  Dosing           



                                                  Weight           



                                                  127.273,           



                                                  kg,            



                                                  Priority:           



                                                  STAT,           



                                                  Start           



                                                  date:           



                                                  18           



                                                  17:56:00           



                                                  CDT, Stop           



                                                  date:           



                                                  18           



                                                  17:56:00           



                                                  CDT            

 

     Benadryl      2018-0      No                       Notes:           [facility]



               5-03                               (Same as:           l



               22:56:                               Benadryl)                                                           

 

     Morphine      2018-0      No                       4 mg, 1           [facility]

a



               5-03                               mL, Route:           l



               22:56:                               IVP, Drug           [facility]                                 form:           



                                                  SOLN,           



                                                  ONCE,           



                                                  Dosing           



                                                  Weight           



                                                  127.273,           



                                                  kg,            



                                                  Priority:           



                                                  STAT,           



                                                  Start           



                                                  date:           



                                                  18           



                                                  17:56:00           



                                                  CDT, Stop           



                                                  date:           



                                                  18           



                                                  17:56:00           



                                                  CDT            

 

     Benadryl      2018-0      No                       Notes:           [facility]



               5-03                               (Same as:           l



               22:56:                               Benadryl)                                                           

 

     Morphine      2018-0      No                       4 mg, 1           [facility]

a



               5-03                               mL, Route:           l



               22:56:                               IVP, Drug           [facility]                                 form:           



                                                  SOLN,           



                                                  ONCE,           



                                                  Dosing           



                                                  Weight           



                                                  127.273,           



                                                  kg,            



                                                  Priority:           



                                                  STAT,           



                                                  Start           



                                                  date:           



                                                  18           



                                                  17:56:00           



                                                  CDT, Stop           



                                                  date:           



                                                  18           



                                                  17:56:00           



                                                  CDT            

 

     Benadryl      2018-0      No                       Notes:           [facility]



               5-03                               (Same as:           l



               22:56:                               Benadryl)                                                           

 

     Morphine      2018-0      No                       4 mg, 1           [facility]

a



               5-03                               mL, Route:           l



               22:56:                               IVP, Drug                                            form:           



                                                  SOLN,           



                                                  ONCE,           



                                                  Dosing           



                                                  Weight           



                                                  127.273,           



                                                  kg,            



                                                  Priority:           



                                                  STAT,           



                                                  Start           



                                                  date:           



                                                  18           



                                                  17:56:00           



                                                  CDT, Stop           



                                                  date:           



                                                  18           



                                                  17:56:00           



                                                  CDT            

 

     Benadryl      2018-0      No                       Notes:           [facility]



               5-                               (Same as:           l



               22:56:                               Benadryl)                                                           

 

     Morphine      -0      No                       4 mg, 1           [facility]

a



               5-03                               mL, Route:           l



               22:56:                               IVP, Drug                                            form:           



                                                  SOLN,           



                                                  ONCE,           



                                                  Dosing           



                                                  Weight           



                                                  127.273,           



                                                  kg,            



                                                  Priority:           



                                                  STAT,           



                                                  Start           



                                                  date:           



                                                  18           



                                                  17:56:00           



                                                  CDT, Stop           



                                                  date:           



                                                  18           



                                                  17:56:00           



                                                  CDT            

 

     OXYCODONE      2017      Yes                      Take by           [facility]

rs



     HCL/ACETAMI      2-20                               mouth.           ity of



     NOPHEN      10:03:                                              [place]



     (PERCOCET      17                                                Medical



     [facility])                                                        [place]

 

     OXYCODONE      2017      Yes                      Take by           [facility]

rs



     HCL/ACETAMI      2-20                               mouth.           ity of



     NOPHEN      04:03:                                              [place]



     (PERCOCET      17                                                Medical



     [facility])                                                        [place]

 

     OXYCODONE      2017      Yes                      Take by           [facility]

rs



     HCL/ACETAMI      2-20                               mouth.           ity of



     NOPHEN      04:03:                                              [place]



     (PERCOCET      17                                                Medical



     [facility])                                                        [place]

 

     OXYCODONE      2017      Yes                      Take by           [facility]

rs



     HCL/ACETAMI      2-20                               mouth.           ity of



     NOPHEN      04:03:                                              [place]



     (PERCOCET      17                                                Medical



     [facility])                                                        [place]

 

     OXYCODONE      2017      Yes                      Take by           [facility]

rs



     HCL/ACETAMI      2-20                               mouth.           ity of



     NOPHEN      04:03:                                              [place]



     (PERCOCET      17                                                Medical



     [facility])                                                        [facility]

 

     dicyclomine      2017      Yes            20mg      Take 1           [facility]

ers



     (BENTYL) 20      2-20                               tablet by           ity

 of



     mg tablet      00:00:                               mouth 4           [place]



               00                                 (four)           Medical



                                                  times           [facility]



                                                  daily.           

 

     proMETHazin      2017      Yes            25mg      Take 1           [facility]

ers



     e 25 mg      2-20                               tablet by           ity of



     tablet      00:00:                               mouth           [place]



               00                                 every 6           Medical



                                                  (six)           Branch



                                                  hours as           



                                                  needed for           



                                                  Nausea and           



                                                  Vomiting           



                                                  (N/V).           

 

     proMETHazin      2017      Yes            25mg      Take 1           [facility]

ers



     e 25 mg      2-20                               tablet by           ity of



     tablet      00:00:                               mouth           [place]



               00                                 every 6           Medical



                                                  (six)           Branch



                                                  hours as           



                                                  needed for           



                                                  Nausea and           



                                                  Vomiting           



                                                  (N/V).           

 

     proMETHazin      2017      Yes            25mg      Take 1           [facility]

ers



     e 25 mg      2-20                               tablet by           ity of



     tablet      00:00:                               mouth           [place]



               00                                 every 6           Medical



                                                  (six)           Branch



                                                  hours as           



                                                  needed for           



                                                  Nausea and           



                                                  Vomiting           



                                                  (N/V).           

 

     dicyclomine      2017      Yes            20mg      Take 1           [facility]

ers



     (BENTYL) 20      2-20                               tablet by           ity

 of



     mg tablet      00:00:                               mouth 4           [place]



               00                                 (four)           Medical



                                                  times           [facility]



                                                  daily.           

 

     proMETHazin      2017      Yes            25mg      Take 1           [facility]

ers



     e 25 mg      2-20                               tablet by           ity of



     tablet      00:00:                               mouth           [place]



               00                                 every 6           Medical



                                                  (six)           Branch



                                                  hours as           



                                                  needed for           



                                                  Nausea and           



                                                  Vomiting           



                                                  (N/V).           

 

     dicyclomine      2017      Yes            20mg      Take 1           [facility]

ers



     (BENTYL) 20      2-20                               tablet by           ity

 of



     mg tablet      00:00:                               mouth 4           [place]



               00                                 (four)           Medical



                                                  times           Branch



                                                  daily.           

 

     proMETHazin      2017      Yes            25mg      Take 1           [facility]

ers



     e 25 mg      2-20                               tablet by           ity of



     tablet      00:00:                               mouth           [place]



               00                                 every 6           Medical



                                                  (six)           Branch



                                                  hours as           



                                                  needed for           



                                                  Nausea and           



                                                  Vomiting           



                                                  (N/V).           

 

     proMETHazin      2017- No             25mg      Take 1           [facility]

vers



     e 25 mg      2-20 01-25                          tablet by           ity of



     tablet      00:00: 00:00                          mouth           [place]



               00   :00                           every 6           Medical



                                                  (six)           Branch



                                                  hours as           



                                                  needed for           



                                                  Nausea and           



                                                  Vomiting           



                                                  (N/V).           

 

     dicyclomine      2017- No             20mg      Take 1           [facility]

vers



     (BENTYL) 20      2-20 01-15                          tablet by           it

y of



     mg tablet      00:00: 00:00                          mouth 4           [place]

[facility]



               00   :00                           (four)           Medical



                                                  times           [facility]



                                                  daily.           

 

     proMETHazin            Yes            25mg      Take 1           [facility]

ers



     e 25 mg      1-04                               tablet by           ity of



     tablet      00:00:                               mouth           [place]



               00                                 every 6           Medical



                                                  (six)           Branch



                                                  hours as           



                                                  needed for           



                                                  Nausea and           



                                                  Vomiting           



                                                  (N/V) for           



                                                  up to 12           



                                                  doses.           

 

     proMETHazin      2017-0      Yes            25mg      Take 1           [facility]

ers



     e 25 mg      1-04                               tablet by           ity of



     tablet      00:00:                               mouth           [place]



               00                                 every 6           Medical



                                                  (six)           Branch



                                                  hours as           



                                                  needed for           



                                                  Nausea and           



                                                  Vomiting           



                                                  (N/V) for           



                                                  up to 12           



                                                  doses.           

 

     proMETHazin      2017-0      Yes            25mg      Take 1           [facility]

ers



     e 25 mg      1-04                               tablet by           ity of



     tablet      00:00:                               mouth           [place]



               00                                 every 6           Medical



                                                  (six)           Branch



                                                  hours as           



                                                  needed for           



                                                  Nausea and           



                                                  Vomiting           



                                                  (N/V) for           



                                                  up to 12           



                                                  doses.           

 

     proMETHazin      2017-0      Yes            25mg      Take 1           [facility]

ers



     e 25 mg      1-04                               tablet by           ity of



     tablet      00:00:                               mouth           [place]



               00                                 every 6           Medical



                                                  (six)           Branch



                                                  hours as           



                                                  needed for           



                                                  Nausea and           



                                                  Vomiting           



                                                  (N/V) for           



                                                  up to 12           



                                                  doses.           

 

     proMETHazin      2017-0      Yes            25mg      Take 1           [facility]

ers



     e 25 mg      1-04                               tablet by           ity of



     tablet      00:00:                               mouth           [place]



               00                                 every 6           Medical



                                                  (six)           Branch



                                                  hours as           



                                                  needed for           



                                                  Nausea and           



                                                  Vomiting           



                                                  (N/V) for           



                                                  up to 12           



                                                  doses.           

 

     proMETHazin      2017-0      Yes            25mg      Take 1           [facility]

ers



     e 25 mg      1-04                               tablet by           ity of



     tablet      00:00:                               mouth           [place]



               00                                 every 6           Medical



                                                  (six)           Branch



                                                  hours as           



                                                  needed for           



                                                  Nausea and           



                                                  Vomiting           



                                                  (N/V) for           



                                                  up to 12           



                                                  doses.           

 

     proMETHazin      2017-0      Yes            25mg      Take 1           [facility]

ers



     e 25 mg      1-04                               tablet by           ity of



     tablet      00:00:                               mouth           [place]



               00                                 every 6           Medical



                                                  (six)           Branch



                                                  hours as           



                                                  needed for           



                                                  Nausea and           



                                                  Vomiting           



                                                  (N/V) for           



                                                  up to 12           



                                                  doses.           

 

     proMETHazin      2017-0      Yes            25mg      Take 1           [facility]

ers



     e 25 mg      1-04                               tablet by           ity of



     tablet      00:00:                               mouth           [place]



               00                                 every 6           Medical



                                                  (six)           Branch



                                                  hours as           



                                                  needed for           



                                                  Nausea and           



                                                  Vomiting           



                                                  (N/V) for           



                                                  up to 12           



                                                  doses.           

 

     proMETHazin      2017-0      Yes            25mg      Take 1           [facility]

ers



     e 25 mg      1-04                               tablet by           ity of



     tablet      00:00:                               mouth           [place]



               00                                 every 6           Medical



                                                  (six)           Branch



                                                  hours as           



                                                  needed for           



                                                  Nausea and           



                                                  Vomiting           



                                                  (N/V) for           



                                                  up to 12           



                                                  doses.           

 

     proMETHazin      2017-0 - No             25mg      Take 1           [facility]

vers



     e 25 mg      1-04 05-11                          tablet by           ity of



     tablet      00:00: 00:00                          mouth           [place]



               00   :00                           every 6           Medical



                                                  (six)           Branch



                                                  hours as           



                                                  needed for           



                                                  Nausea and           



                                                  Vomiting           



                                                  (N/V) for           



                                                  up to 12           



                                                  doses.           

 

     Tylenol Tylenol           Yes  [facility] [place]                1 tablet           [facility]

[facility]



     with with                                    as needed           [facility]



     Codeine #4 Codeine #4                                                   - C

HI



                                                                 [facility]

 

     Macrobid Macrobid           Yes  [facility] [place]                1 capsule          

 Common



                                                  with food           [facility]

 

     Pantoprazol Pantoprazol           Yes  [facility] [place]                1 tablet     

      Common



     e Sodium e Sodium                                                   [facility]

 

     Collagenase Collagenase           Yes  [facility] [place]                1            

  Common



                                                  applicatio           [facility]



                                                  n to           - [facility]



                                                  affected           [facility]

 

     Pantoprazol Pantoprazol           No             1{table QD   Pantoprazo   

        



     e Sodium 40 e Sodium 40                          t}        le Sodium       

    



     MG   MG                                      40 MG           

 

     Tylenol Tylenol           No             1{table QID  Tylenol           



     with with                          t_as_ne      with           



     Codeine #4 Codeine #4                          eded}      Codeine #4       

    



     300-60 -60 MG                                    300-60 MG           

 

     Collagenase Collagenase           No             1{appli QD   Collagenas   

        



     250 UNIT/ UNIT/GM                          cation_      e 250        

   



                                        to_affe      UNIT/GM           



                                        cted_ar                     



                                        ea}                      

 

     Tylenol Tylenol           No             1{table QID  Tylenol           



     with with                          t_as_ne      with           



     Codeine #4 Codeine #4                          eded}      Codeine #4       

    



     300-60 -60 MG                                    300-60 MG           

 

     Pantoprazol Pantoprazol           No             1{table QD   Pantoprazo   

        



     e Sodium 40 e Sodium 40                          t}        le Sodium       

    



     MG   MG                                      40 MG           

 

     SEROquel 25 SEROquel 25           No             1{table      SEROquel     

      



     MG   MG                            t}        25 MG           

 

     Collagenase Collagenase           No             1{appli QD   [facility]   

        



     250 UNIT/ UNIT/GM                          cation_      e 250        

   



                                        to_affe      UNIT/GM           



                                        cted_ar                     



                                        ea}                      

 

     Macrobid Macrobid           No             1{capsu BID  Macrobid           



     100  MG                          le_with      100 MG           



                                        _food}                     

 

     Tylenol Tylenol           No             1{table QID                 



     with with                          t_as_ne                     



     Codeine #4 Codeine #4                          eded}                     



     300-60 -60 MG                                                   

 

     Pantoprazol Pantoprazol           No             1{table QD                

  



     e Sodium 40 e Sodium 40                          t}                       



     MG   MG                                                     

 

     SEROquel 25 SEROquel 25           No             1{table                   

  



     MG   MG                            t}                       

 

     Collagenase Collagenase           No             1{appli QD                

  



     250 UNIT/ UNIT/GM                          cation_                   

  



                                        to_affe                     



                                        cted_ar                     



                                        ea}                      

 

     Macrobid Macrobid           No             1{capsu BID                 



     100  MG                          le_with                     



                                        _food}                     

 

     SEROquel 25 SEROquel 25           No             1{table      SEROquel     

      



     MG   MG                            t}        25 MG           

 

     Macrobid Macrobid           No             1{capsu BID  Macrobid           



     100  MG                          le_with      100 MG           



                                        _food}                     

 

     Collagenase Collagenase           No             1{appli QD   Collagenas   

        



     250 UNIT/ UNIT/GM                          cation_      e 250        

   



                                        to_affe      UNIT/GM           



                                        cted_ar                     



                                        ea}                      

 

     Pantoprazol Pantoprazol           No             1{table QD   Pantoprazo   

        



     e Sodium 40 e Sodium 40                          t}        le Sodium       

    



     MG   MG                                      40 MG           

 

     Tylenol Tylenol           No             1{table QID  Tylenol           



     with with                          t_as_ne      with           



     Codeine #4 Codeine #4                          eded}      Codeine #4       

    



     300-60 -60 MG                                    300-60 MG           

 

     Tylenol Tylenol           No             1{table QID  Tylenol           



     with with                          t_as_ne      with           



     Codeine #4 Codeine #4                          eded}      Codeine #4       

    



     300-60 -60 MG                                    300-60 MG           

 

     Collagenase Collagenase           No             1{appli QD   Collagenas   

        



     250 UNIT/ UNIT/GM                          cation_      e 250        

   



                                        to_affe      UNIT/GM           



                                        cted_ar                     



                                        ea}                      

 

     Pantoprazol Pantoprazol           No             1{table QD   Pantoprazo   

        



     e Sodium 40 e Sodium 40                          t}        le Sodium       

    



     MG   MG                                      40 MG           

 

     Macrobid Macrobid           No             1{capsu BID  Macrobid           



     100  MG                          le_with      100 MG           



                                        _food}                     

 

     SEROquel 25 SEROquel 25           No             1{table      SEROquel     

      



     MG   MG                            t}        25 MG           

 

     Tylenol Tylenol           No             1{table QID  Tylenol           



     with with                          t_as_ne      with           



     Codeine #4 Codeine #4                          eded}      Codeine #4       

    



     300-60 -60 MG                                    300-60 MG           

 

     Collagenase Collagenase           No             1{appli QD   Collagenas   

        



     250 UNIT/ UNIT/GM                          cation_      e 250        

   



                                        to_affe      UNIT/GM           



                                        cted_ar                     



                                        ea}                      

 

     Pantoprazol Pantoprazol           No             1{table QD   Pantoprazo   

        



     e Sodium 40 e Sodium 40                          t}        le Sodium       

    



     MG   MG                                      40 MG           

 

     Macrobid Macrobid           No             1{capsu BID  Macrobid           



     100  MG                          le_with      100 MG           



                                        _food}                     

 

     SEROquel 25 SEROquel 25           No             1{table      SEROquel     

      



     MG   MG                            t}        25 MG           

 

     SEROquel 25 SEROquel 25           No             1{table      SEROquel     

      



     MG   MG                            t}        25 MG           

 

     Macrobid Macrobid           No             1{capsu BID  Macrobid           



     100  MG                          le_with      100 MG           



                                        _food}                     

 

     Pantoprazol Pantoprazol           No             1{table QD   Pantoprazo   

        



     e Sodium 40 e Sodium 40                          t}        le Sodium       

    



     MG   MG                                      40 MG           

 

     Tylenol Tylenol           No             1{table QID  Tylenol           



     with with                          t_as_ne      with           



     Codeine #4 Codeine #4                          eded}      Codeine #4       

    



     300-60 -60 MG                                    300-60 MG           

 

     Collagenase Collagenase           No             1{appli QD   Collagenas   

        



     250 UNIT/ UNIT/GM                          cation_      e 250        

   



                                        to_affe      UNIT/GM           



                                        cted_ar                     



                                        ea}                      

 

     SEROquel 25 SEROquel 25           No             1{table      SEROquel     

      



     MG   MG                            t}        25 MG           

 

     Macrobid Macrobid           No             1{capsu BID  Macrobid           



     100  MG                          le_with      100 MG           



                                        _food}                     







Immunizations







           Ordered    Filled Immunization Date       Status     Comments   Sourc

e



           Immunization Name Name                                        

 

           SARS-COV-2 COVID-19            2021 Completed             [facility]

[facility]



           MODERNA, 6MO-5YRS,            00:00:00                         [facility]



           0.25ML VACCINE                                             [facility]

 

           SARS-COV-2 COVID-19            2021 Completed             [facility]

[facility]



           MODERNA, 6MO-5YRS,            00:00:00                         [facility]



           0.25ML VACCINE                                             [facility]

 

           SARS-COV-2 COVID-19            2021 Completed             [facility]

[facility] of



           MODERNA, 6MO-5YRS,            00:00:00                         [facility]



           0.25ML VACCINE                                             [facility]

 

           SARS-COV-2 COVID-19            2021 Completed             [facility]

[facility] of



           MODERNA, 6MO-5YRS,            00:00:00                         [facility]



           0.25ML VACCINE                                             [facility]

 

           SARS-COV-2 COVID-19            2021 Completed             [facility]

[facility] of



           MODERNA, 6MO-5YRS,            00:00:00                         [facility]



           0.25ML VACCINE                                             [facility]

 

           SARS-COV-2 COVID-19            2021 Completed             [facility]

[facility] of



           MODERNA, 6MO-5YRS,            00:00:00                         [facility]



           0.25ML VACCINE                                             [facility]

 

           SARS-COV-2 COVID-19            2021 Completed             [facility]

[facility] of



           MODERNA, 6MO-5YRS,            00:00:00                         [facility]



           0.25ML VACCINE                                             [facility]

 

           SARS-COV-2 COVID-19            2021 Completed             [facility]

[facility] of



           MODERNA, 6MO-5YRS,            00:00:00                         [facility]



           0.25ML VACCINE                                             [facility]

 

           SARS-COV-2 COVID-19            2021 Completed             [facility]

[facility] of



           MODERNA, 6MO-5YRS,            00:00:00                         [facility]



           0.25ML VACCINE                                             [facility]

 

           SARS-COV-2 COVID-19            2021 Completed             [facility]

[facility] of



           MODERNA, 6MO-5YRS,            00:00:00                         [facility]



           0.25ML VACCINE                                             [facility]

 

           SARS-COV-2 COVID-19            2021 Completed             [facility]

[facility] of



           MODERNA, 6MO-5YRS,            00:00:00                         [facility]



           0.25ML VACCINE                                             [facility]

 

           SARS-COV-2 COVID-19            2021 Completed             [facility]

[facility] of



           MODERNA, 6MO-5YRS,            00:00:00                         [facility]



           0.25ML VACCINE                                             [facility]

 

           SARS-COV-2 COVID-19            2021 Completed             [facility]

[facility] of



           MODERNA, 6MO-5YRS,            00:00:00                         [facility]



           0.25ML VACCINE                                             [facility]

 

           SARS-COV-2 COVID-19            2021 Completed             [facility]

[facility] of



           MODERNA, 6MO-5YRS,            00:00:00                         [facility]



           0.25ML VACCINE                                             [facility]

 

           SARS-COV-2 COVID-19            2021 Completed             [facility]

[facility] of



           MODERNA, 6MO-5YRS,            00:00:00                         [facility]



           0.25ML VACCINE                                             [facility]

 

           SARS-COV-2 COVID-19            2021 Completed             [facility]

[facility] of



           MODERNA, 6MO-5YRS,            00:00:00                         [facility]



           0.25ML VACCINE                                             Branch

 

           SARS-COV-2 COVID-19            2021 Completed             [facility]

[facility] of



           MODERNA, 6MO-5YRS,            00:00:00                         [facility]



           0.25ML VACCINE                                             [facility]

 

           SARS-COV-2 COVID-19            2021 Completed             [facility]

[facility] of



           MODERNA, 6MO-5YRS,            00:00:00                         [facility]



           0.25ML VACCINE                                             Br[facility]

 

           SARS-COV-2 COVID-19            2021 Completed             [facility]

[facility] of



           MODERNA, 6MO-5YRS,            00:00:00                         [facility]



           0.25ML VACCINE                                             [facility]

 

           SARS-COV-2 COVID-19            2021 Completed             [facility]

[facility] of



           MODERNA, 6MO-5YRS,            00:00:00                         [facility]



           0.25ML VACCINE                                             [facility]

 

           SARS-COV-2 COVID-19            2021 Completed             [facility]

[facility] of



           MODERNA, 6MO-5YRS,            00:00:00                         [facility]



           0.25ML VACCINE                                             [facility]

 

           SARS-COV-2 COVID-19            2021 Completed             [facility]

[facility] of



           MODERNA, 6MO-5YRS,            00:00:00                         [facility]



           0.25ML VACCINE                                             Br[facility]

 

           SARS-COV-2 COVID-19            2021 Completed             [facility]

[facility] of



           MODERNA, 6MO-5YRS,            00:00:00                         [facility]



           0.25ML VACCINE                                             [facility]

 

           SARS-COV-2 COVID-19            2021 Completed             [facility]

[facility] of



           MODERNA, 6MO-5YRS,            00:00:00                         [facility]



           0.25ML VACCINE                                             Branch

 

           SARS-COV-2 COVID-19            2021 Completed             [facility]

[facility] of



           MODERNA, 6MO-5YRS,            00:00:00                         [facility]



           0.25ML VACCINE                                             Br[facility]

 

           SARS-COV-2 COVID-19            2021 Completed             [facility]

[facility] of



           MODERNA, 6MO-5YRS,            00:00:00                         [facility]



           0.25ML VACCINE                                             Br[facility]

 

           SARS-COV-2 COVID-19            2021 Completed             [facility]

[facility] of



           MODERNA, 6MO-5YRS,            00:00:00                         [facility]



           0.25ML VACCINE                                             [facility]

 

           SARS-COV-2 COVID-19            2021 Completed             [facility]

[facility] of



           MODERNA, 6MO-5YRS,            00:00:00                         [facility]



           0.25ML VACCINE                                             Branch

 

           SARS-COV-2 COVID-19            2021 Completed             [facility]

[facility] of



           MODERNA, 6MO-5YRS,            00:00:00                         [facility]



           0.25ML VACCINE                                             [facility]

 

           SARS-COV-2 COVID-19            2021 Completed             [facility]

[facility] of



           MODERNA, 6MO-5YRS,            00:00:00                         [facility]



           0.25ML VACCINE                                             [facility]

 

           SARS-COV-2 COVID-19            2021 Completed             [facility]

[facility] of



           MODERNA, 6MO-5YRS,            00:00:00                         [facility]



           0.25ML VACCINE                                             [facility]

 

           SARS-COV-2 COVID-19            2021 Completed             [facility]

[facility] of



           MODERNA, 6MO-5YRS,            00:00:00                         [facility]



           0.25ML VACCINE                                             [facility]anch

 

           SARS-COV-2 COVID-19            2021 Completed             [facility]

[facility] of



           MODERNA, 6MO-5YRS,            00:00:00                         [facility]



           0.25ML VACCINE                                             Branch

 

           SARS-COV-2 COVID-19            2021 Completed             [facility]

[facility] of



           MODERNA, 6MO-5YRS,            00:00:00                         [facility]



           0.25ML VACCINE                                             Branch

 

           SARS-COV-2 COVID-19            2021 Completed             [facility]

[facility] of



           MODERNA, 6MO-5YRS,            00:00:00                         [facility]



           0.25ML VACCINE                                             Branch

 

           SARS-COV-2 COVID-19            2021 Completed             [facility]

[facility] of



           MODERNA, 6MO-5YRS,            00:00:00                         [facility]



           0.25ML VACCINE                                             Branch

 

           SARS-COV-2 COVID-19            2021 Completed             [facility]

[facility] of



           MODERNA, 6MO-5YRS,            00:00:00                         [facility]



           0.25ML VACCINE                                             Branch

 

           SARS-COV-2 COVID-19            2021 Completed             [facility]

[facility] of



           MODERNA, 6MO-5YRS,            00:00:00                         [facility]



           0.25ML VACCINE                                             Branch

 

           SARS-COV-2 COVID-19            2021 Completed             [facility]

[facility] of



           MODERNA, 6MO-5YRS,            00:00:00                         [facility]



           0.25ML VACCINE                                             Branch

 

           SARS-COV-2 COVID-19            2021 Completed             [facility]

[facility] of



           MODERNA, 6MO-5YRS,            00:00:00                         [facility]



           0.25ML VACCINE                                             Branch

 

           SARS-COV-2 COVID-19            2021 Completed             [facility]

[facility] of



           MODERNA, 6MO-5YRS,            00:00:00                         [facility]



           0.25ML VACCINE                                             Branch

 

           SARS-COV-2 COVID-19            2021 Completed             [facility]

[facility] of



           MODERNA, 6MO-5YRS,            00:00:00                         [facility]



           0.25ML VACCINE                                             Branch

 

           SARS-COV-2 COVID-19            2021 Completed             [facility]

[facility] of



           MODERNA, 6MO-5YRS,            00:00:00                         [facility]



           0.25ML VACCINE                                             Branch

 

           SARS-COV-2 COVID-19            2021 Completed             [facility]

[facility] of



           MODERNA, 6MO-5YRS,            00:00:00                         [facility]



           0.25ML VACCINE                                             Branch

 

           SARS-COV-2 COVID-19            2021 Completed             [facility]

[facility] of



           MODERNA, 6MO-5YRS,            00:00:00                         [facility]



           0.25ML VACCINE                                             Branch

 

           SARS-COV-2 COVID-19            2021 Completed             [facility]

[facility] of



           MODERNA, 6MO-5YRS,            00:00:00                         [facility]



           0.25ML VACCINE                                             Branch

 

           SARS-COV-2 COVID-19            2021 Completed             [facility]

rs[facility] of



           MODERNA, 6MO-5YRS,            00:00:00                         [facility]



           0.25ML VACCINE                                             Branch

 

           SARS-COV-2 COVID-19            2021 Completed             [facility]

[facility] of



           MODERNA, 6MO-5YRS,            00:00:00                         [facility]



           0.25ML VACCINE                                             Branch

 

           SARS-COV-2 COVID-19            2021 Completed             [facility]

[facility] of



           MODERNA, 6MO-5YRS,            00:00:00                         [facility]



           0.25ML VACCINE                                             Branch

 

           SARS-COV-2 COVID-19            2021 Completed             [facility]

rs[facility] of



           MODERNA, 6MO-5YRS,            00:00:00                         [facility]



           0.25ML VACCINE                                             Branch

 

           SARS-COV-2 COVID-19            2021 Completed             [facility]

[facility] of



           MODERNA, 6MO-5YRS,            00:00:00                         [facility]



           0.25ML VACCINE                                             Branch

 

           SARS-COV-2 COVID-19            2021 Completed             [facility]

[facility] of



           MODERNA, 6MO-5YRS,            00:00:00                         [facility]



           0.25ML VACCINE                                             Branch

 

           SARS-COV-2 COVID-19            2021 Completed             [facility]

[facility] of



           MODERNA, 6MO-5YRS,            00:00:00                         [facility]



           0.25ML VACCINE                                             Branch

 

           SARS-COV-2 COVID-19            2020 Completed             [facility]

[facility] of



           MODERNA 12+ YRS            00:00:00                         [facility]



           VACCINE                                                [facility]

 

           SARS-COV-2 COVID-19            2020 Completed             [facility]

[facility] of



           MODERNA 12+ YRS            00:00:00                         [facility]



           VACCINE                                                [facility]

 

           SARS-COV-2 COVID-19            2020 Completed             [facility]

[facility] of



           MODERNA 12+ YRS            00:00:00                         [facility]



           VACCINE                                                [facility]

 

           SARS-COV-2 COVID-19            2020 Completed             [facility]

[facility] of



           MODERNA 12+ YRS            00:00:00                         [facility]



           VACCINE                                                [facility]

 

           SARS-COV-2 COVID-19            2020 Completed             [facility]

[facility] of



           MODERNA 12+ YRS            00:00:00                         [facility]



           VACCINE                                                [facility]

 

           SARS-COV-2 COVID-19            2020 Completed             [facility]

rs[facility] of



           MODERNA 12+ YRS            00:00:00                         [facility]



           VACCINE                                                [facility]

 

           SARS-COV-2 COVID-19            2020 Completed             [facility]

rs[facility] of



           MODERNA 12+ YRS            00:00:00                         [facility]



           VACCINE                                                [facility]

 

           SARS-COV-2 COVID-19            2020 Completed             [facility]

rs[facility] of



           MODERNA 12+ YRS            00:00:00                         [facility]



           VACCINE                                                [facility]

 

           SARS-COV-2 COVID-19            2020 Completed             [facility]

rs[facility] of



           MODERNA 12+ YRS            00:00:00                         [facility]



           VACCINE                                                [facility]

 

           SARS-COV-2 COVID-19            2020 Completed             [facility]

rs[facility] of



           MODERNA 12+ YRS            00:00:00                         [facility]



           VACCINE                                                [facility]

 

           SARS-COV-2 COVID-19            2020 Completed             [facility]

rs[facility] of



           MODERNA 12+ YRS            00:00:00                         [facility]



           VACCINE                                                [facility]

 

           SARS-COV-2 COVID-19            2020 Completed             [facility]

rs[facility] of



           MODERNA 12+ YRS            00:00:00                         [facility]



           VACCINE                                                [facility]

 

           SARS-COV-2 COVID-19            2020 Completed             [facility]

rs[facility] of



           MODERNA 12+ YRS            00:00:00                         [facility]



           VACCINE                                                [facility]

 

           SARS-COV-2 COVID-19            2020 Completed             [facility]

rs[facility] of



           MODERNA 12+ YRS            00:00:00                         [facility]

[facility]



           VACCINE                                                [facility]anch

 

           SARS-COV-2 COVID-19            2020 Completed             [facility]

[facility]



           MODERNA 12+ YRS            00:00:00                         [facility]

[facility]



           VACCINE                                                [facility]

 

           SARS-COV-2 COVID-19            2020 Completed             [facility]

[facility] of



           MODERNA 12+ YRS            00:00:00                         [facility]

[facility]



           VACCINE                                                [facility]

 

           SARS-COV-2 COVID-19            2020 Completed             [facility]

[facility] of



           MODERNA 12+ YRS            00:00:00                         [facility]

[facility]



           VACCINE                                                [facility]

 

           SARS-COV-2 COVID-19            2020 Completed             [facility]

[facility] of



           MODERNA 12+ YRS            00:00:00                         [facility]

[facility]



           VACCINE                                                [facility]

 

           SARS-COV-2 COVID-19            2020 Completed             [facility]

[facility] of



           MODERNA 12+ YRS            00:00:00                         [facility]

[facility]



           VACCINE                                                [facility]

 

           SARS-COV-2 COVID-19            2020 Completed             [facility]

[facility] of



           MODERNA 12+ YRS            00:00:00                         [facility]

[facility]



           VACCINE                                                [facility]

 

           SARS-COV-2 COVID-19            2020 Completed             [facility]

[facility] of



           MODERNA 12+ YRS            00:00:00                         [facility]

[facility]



           VACCINE                                                [facility]

 

           SARS-COV-2 COVID-19            2020 Completed             [facility]

[facility] of



           MODERNA 12+ YRS            00:00:00                         [facility]



           VACCINE                                                [facility]

 

           SARS-COV-2 COVID-19            2020 Completed             [facility]

[facility] of



           MODERNA 12+ YRS            00:00:00                         [facility]



           VACCINE                                                [facility]

 

           SARS-COV-2 COVID-19            2020 Completed             [facility]

[facility] of



           MODERNA 12+ YRS            00:00:00                         [facility]



           VACCINE                                                [facility]

 

           SARS-COV-2 COVID-19            2020 Completed             [facility]

[facility] of



           MODERNA 12+ YRS            00:00:00                         [facility]



           VACCINE                                                [facility]

 

           SARS-COV-2 COVID-19            2020 Completed             [facility]

rs[facility] of



           MODERNA 12+ YRS            00:00:00                         [facility]



           VACCINE                                                [facility]

 

           SARS-COV-2 COVID-19            2020 Completed             [facility]

rs[facility] of



           MODERNA 12+ YRS            00:00:00                         [facility]



           VACCINE                                                [facility]

 

           SARS-COV-2 COVID-19            2020 Completed             [facility]

[facility] of



           MODERNA 12+ YRS            00:00:00                         [facility]



           VACCINE                                                [facility]

 

           SARS-COV-2 COVID-19            2020 Completed             [facility]

[facility] of



           MODERNA 12+ YRS            00:00:00                         [facility]



           VACCINE                                                [facility]

 

           SARS-COV-2 COVID-19            2020 Completed             [facility]

rs[facility] of



           MODERNA 12+ YRS            00:00:00                         [facility]

[facility]



           VACCINE                                                [facility]

 

           SARS-COV-2 COVID-19            2020 Completed             [facility]

rs[facility] of



           MODERNA 12+ YRS            00:00:00                         [facility]

[facility]



           VACCINE                                                [facility]

 

           SARS-COV-2 COVID-19            2020 Completed             [facility]

rs[facility] of



           MODERNA 12+ YRS            00:00:00                         [facility]

[facility]



           VACCINE                                                [facility]

 

           SARS-COV-2 COVID-19            2020 Completed             [facility]

[facility] of



           MODERNA 12+ YRS            00:00:00                         [facility]

[facility]



           VACCINE                                                [facility]

 

           SARS-COV-2 COVID-19            2020 Completed             [facility]

rs[facility] of



           MODERNA 12+ YRS            00:00:00                         [facility]

[facility]



           VACCINE                                                [facility]

 

           SARS-COV-2 COVID-19            2020 Completed             [facility]

[facility] of



           MODERNA 12+ YRS            00:00:00                         [facility]



           VACCINE                                                [facility]

 

           SARS-COV-2 COVID-19            2020 Completed             [facility]

[facility] of



           MODERNA 12+ YRS            00:00:00                         [facility]

[facility]



           VACCINE                                                [facility]

 

           SARS-COV-2 COVID-19            2020 Completed             [facility]

[facility] of



           MODERNA 12+ YRS            00:00:00                         [facility]

[facility]



           VACCINE                                                [facility]

 

           SARS-COV-2 COVID-19            2020 Completed             [facility]

[facility] of



           MODERNA 12+ YRS            00:00:00                         [facility]

[facility]



           VACCINE                                                [facility]

 

           SARS-COV-2 COVID-19            2020 Completed             [facility]

[facility] of



           MODERNA 12+ YRS            00:00:00                         [facility]



           VACCINE                                                [facility]

 

           SARS-COV-2 COVID-19            2020 Completed             [facility]

[facility] of



           MODERNA 12+ YRS            00:00:00                         [facility]

[facility]



           VACCINE                                                [facility]

 

           SARS-COV-2 COVID-19            2020 Completed             [facility]

[facility] of



           MODERNA 12+ YRS            00:00:00                         [facility]

[facility]



           VACCINE                                                [facility]

 

           SARS-COV-2 COVID-19            2020 Completed             [facility]

[facility] of



           MODERNA 12+ YRS            00:00:00                         [facility]

[facility]



           VACCINE                                                [facility]

 

           SARS-COV-2 COVID-19            2020 Completed             [facility]

[facility] of



           MODERNA 12+ YRS            00:00:00                         [facility]

[facility]



           VACCINE                                                [facility]

 

           SARS-COV-2 COVID-19            2020 Completed             [facility]

[facility] of



           MODERNA 12+ YRS            00:00:00                         [facility]

[facility]



           VACCINE                                                [facility]

 

           SARS-COV-2 COVID-19            2020 Completed             [facility]

[facility] of



           MODERNA 12+ YRS            00:00:00                         [facility]

[facility]



           VACCINE                                                [facility]

 

           SARS-COV-2 COVID-19            2020 Completed             [facility]

[facility] of



           MODERNA 12+ YRS            00:00:00                         [facility]

[facility]



           VACCINE                                                [facility]

 

           SARS-COV-2 COVID-19            2020 Completed             [facility]

[facility] of



           MODERNA 12+ YRS            00:00:00                         [facility]

[facility]



           VACCINE                                                [facility]

 

           SARS-COV-2 COVID-19            2020 Completed             [facility]

[facility] of



           MODERNA 12+ YRS            00:00:00                         [facility]

[facility]



           VACCINE                                                [facility]

 

           SARS-COV-2 COVID-19            2020 Completed             [facility]

[facility] of



           MODERNA 12+ YRS            00:00:00                         [facility]

[facility]



           VACCINE                                                [facility]

 

           SARS-COV-2 COVID-19            2020 Completed             [facility]

[facility] of



           MODERNA 12+ YRS            00:00:00                         [facility]

[facility]



           VACCINE                                                [facility]

 

           SARS-COV-2 COVID-19            2020 Completed             [facility]

[facility] of



           MODERNA 12+ YRS            00:00:00                         [facility]

[facility]



           VACCINE                                                [facility]

 

           SARS-COV-2 COVID-19            2020 Completed             [facility]

[facility] of



           MODERNA 12+ YRS            00:00:00                         [facility]

[facility]



           VACCINE                                                [facility]

 

           SARS-COV-2 COVID-19            2020 Completed             [facility]

[facility] of



           MODERNA 12+ YRS            00:00:00                         [facility]

[facility]



           VACCINE                                                [facility]

 

           SARS-COV-2 COVID-19            2020 Completed             [facility]

[facility] of



           MODERNA 12+ YRS            00:00:00                         [facility]

[facility]



           VACCINE                                                [facility]

 

           SARS-COV-2 COVID-19            2020 Completed             [facility]

rs[facility] of



           MODERNA 12+ YRS            00:00:00                         [facility]

[facility]



           VACCINE                                                [facility]

 

           SARS-COV-2 COVID-19            2020 Completed             [facility]

rs[facility] of



           MODERNA 12+ YRS            00:00:00                         [facility]

[facility]



           VACCINE                                                [facility]

 

           SARS-COV-2 COVID-19            2020 Completed             [facility]

[facility] of



           MODERNA 12+ YRS            00:00:00                         [facility]

[facility]



           VACCINE                                                [facility]

 

           SARS-COV-2 COVID-19            2020 Completed             [facility]

[facility] of



           MODERNA 12+ YRS            00:00:00                         [facility]

[facility]



           VACCINE                                                [facility]

 

           SARS-COV-2 COVID-19            2020 Completed             [facility]

[facility] of



           MODERNA 12+ YRS            00:00:00                         [facility]

[facility]



           VACCINE                                                [facility]

 

           SARS-COV-2 COVID-19            2020 Completed             [facility]

[facility] of



           MODERNA 12+ YRS            00:00:00                         [facility]

[facility]



           VACCINE                                                [facility]

 

           SARS-COV-2 COVID-19            2020 Completed             [facility]

[facility] of



           MODERNA 12+ YRS            00:00:00                         [facility]

[facility]



           VACCINE                                                [facility]

 

           SARS-COV-2 COVID-19            2020 Completed             [facility]

[facility] of



           MODERNA 12+ YRS            00:00:00                         [facility]

[facility]



           VACCINE                                                [facility]

 

           SARS-COV-2 COVID-19            2020 Completed             [facility]

[facility] of



           MODERNA 12+ YRS            00:00:00                         [facility]

[facility]



           VACCINE                                                [facility]

 

           SARS-COV-2 COVID-19            2020 Completed             [facility]

[facility] of



           MODERNA 12+ YRS            00:00:00                         [facility]

[facility]



           VACCINE                                                [facility]

 

           SARS-COV-2 COVID-19            2020 Completed             [facility]

[facility] of



           MODERNA 12+ YRS            00:00:00                         [facility]

[facility]



           VACCINE                                                [facility]

 

           SARS-COV-2 COVID-19            2020 Completed             [facility]

rs[facility] of



           MODERNA 12+ YRS            00:00:00                         [facility]

[facility]



           VACCINE                                                [facility]

 

           SARS-COV-2 COVID-19            2020 Completed             [facility]

rs[facility] of



           MODERNA 12+ YRS            00:00:00                         [facility]

[facility]



           VACCINE                                                [facility]

 

           SARS-COV-2 COVID-19            2020 Completed             [facility]

rs[facility] of



           MODERNA 12+ YRS            00:00:00                         [facility]

[facility]



           VACCINE                                                [facility]

 

           SARS-COV-2 COVID-19            2020 Completed             [facility]

rs[facility] of



           MODERNA 12+ YRS            00:00:00                         [facility]

[facility]



           VACCINE                                                [facility]

 

           SARS-COV-2 COVID-19            2020 Completed             [facility]

rs[facility] of



           MODERNA 12+ YRS            00:00:00                         [facility]

[facility]



           VACCINE                                                [facility]

 

           SARS-COV-2 COVID-19            2020 Completed             [facility]

rs[facility] of



           MODERNA 12+ YRS            00:00:00                         [facility]

[facility]



           VACCINE                                                [facility]

 

           SARS-COV-2 COVID-19            2020 Completed             [facility]

rs[facility] of



           MODERNA 12+ YRS            00:00:00                         [facility]

[facility]



           VACCINE                                                [facility]

 

           SARS-COV-2 COVID-19            2020 Completed             [facility]

rs[facility] of



           MODERNA 12+ YRS            00:00:00                         [facility]

[facility]



           VACCINE                                                [facility]

 

           SARS-COV-2 COVID-19            2020 Completed             [facility]

rs[facility] of



           MODERNA 12+ YRS            00:00:00                         [facility]

[facility]



           VACCINE                                                [facility]

 

           SARS-COV-2 COVID-19            2020 Completed             [facility]

rs[facility] of



           MODERNA 12+ YRS            00:00:00                         [facility]

[facility]



           VACCINE                                                [facility]

 

           SARS-COV-2 COVID-19            2020 Completed             [facility]

rs[facility] of



           MODERNA 12+ YRS            00:00:00                         [facility]

[facility]



           VACCINE                                                [facility]

 

           SARS-COV-2 COVID-19            2020 Completed             [facility]

rs[facility] of



           MODERNA 12+ YRS            00:00:00                         [facility]

[facility]



           VACCINE                                                [facility]

 

           SARS-COV-2 COVID-19            2020 Completed             [facility]

rs[facility] of



           MODERNA 12+ YRS            00:00:00                         [facility]

[facility]



           VACCINE                                                [facility]

 

           SARS-COV-2 COVID-19            2020 Completed             [facility]

rs[facility] of



           MODERNA 12+ YRS            00:00:00                         [facility]

[facility]



           VACCINE                                                [facility]

 

           SARS-COV-2 COVID-19            2020 Completed             [facility]

rs[facility] of



           MODERNA 12+ YRS            00:00:00                         [facility]

[facility]



           VACCINE                                                [facility]

 

           SARS-COV-2 COVID-19            2020 Completed             [facility]

rs[facility] of



           MODERNA 12+ YRS            00:00:00                         [facility]

[facility]



           VACCINE                                                [facility]

 

           SARS-COV-2 COVID-19            2020 Completed             [facility]

rs[facility] of



           MODERNA 12+ YRS            00:00:00                         [facility]

[facility]



           VACCINE                                                [facility]

 

           SARS-COV-2 COVID-19            2020 Completed             [facility]

rs[facility] of



           MODERNA 12+ YRS            00:00:00                         [facility]

[facility]



           VACCINE                                                [facility]

 

           SARS-COV-2 COVID-19            2020 Completed             [facility]

rs[facility] of



           MODERNA 12+ YRS            00:00:00                         [facility]

[facility]



           VACCINE                                                [facility]

 

           SARS-COV-2 COVID-19            2020 Completed             [facility]

rs[facility] of



           MODERNA 12+ YRS            00:00:00                         [facility]

[facility]



           VACCINE                                                [facility]

 

           SARS-COV-2 COVID-19            2020 Completed             [facility]

rs[facility] of



           MODERNA 12+ YRS            00:00:00                         [facility]

[facility]



           VACCINE                                                [facility]

 

           SARS-COV-2 COVID-19            2020 Completed             [facility]

rs[facility] of



           MODERNA 12+ YRS            00:00:00                         [facility]

[facility]



           VACCINE                                                [facility]

 

           SARS-COV-2 COVID-19            2020 Completed             [facility]

rs[facility] of



           MODERNA 12+ YRS            00:00:00                         [facility]

[facility]



           VACCINE                                                [facility]

 

           SARS-COV-2 COVID-19            2020 Completed             [facility]

rs[facility] of



           MODERNA 12+ YRS            00:00:00                         [facility]

[facility]



           VACCINE                                                [facility]

 

           SARS-COV-2 COVID-19            2020 Completed             [facility]

rs[facility] of



           MODERNA 12+ YRS            00:00:00                         [facility]

[facility]



           VACCINE                                                [facility]

 

           SARS-COV-2 COVID-19            2020 Completed             [facility]

rs[facility] of



           MODERNA 12+ YRS            00:00:00                         [facility]

[facility]



           VACCINE                                                [facility]

 

           SARS-COV-2 COVID-19            2020 Completed             [facility]

rs[facility] of



           MODERNA 12+ YRS            00:00:00                         [facility]

[facility]



           VACCINE                                                [facility]

 

           SARS-COV-2 COVID-19            2020 Completed             [facility]

rs[facility] of



           MODERNA 12+ YRS            00:00:00                         [facility]

[facility]



           VACCINE                                                [facility]

 

           SARS-COV-2 COVID-19            2020 Completed             [facility]

rs[facility] of



           MODERNA 12+ YRS            00:00:00                         [facility]

[facility]



           VACCINE                                                [facility]

 

           SARS-COV-2 COVID-19            2020 Completed             [facility]

rs[facility] of



           MODERNA 12+ YRS            00:00:00                         [facility]

[facility]



           VACCINE                                                [facility]

 

           SARS-COV-2 COVID-19            2020 Completed             [facility]

rs[facility] of



           MODERNA 12+ YRS            00:00:00                         [facility]

[facility]



           VACCINE                                                [facility]

 

           SARS-COV-2 COVID-19            2020 Completed             [facility]

rs[facility] of



           MODERNA 12+ YRS            00:00:00                         [facility]

[facility]



           VACCINE                                                [facility]

 

           SARS-COV-2 COVID-19            2020 Completed             [facility]

rs[facility] of



           MODERNA 12+ YRS            00:00:00                         [facility]

[facility]



           VACCINE                                                [facility]

 

           SARS-COV-2 COVID-19            2020 Completed             [facility]

rs[facility] of



           MODERNA 12+ YRS            00:00:00                         [facility]

[facility]



           VACCINE                                                [facility]

 

           SARS-COV-2 COVID-19            2020 Completed             [facility]

rsity of



           MODERNA 12+ YRS            00:00:00                         [facility]

[facility]



           VACCINE                                                [facility]

 

           SARS-COV-2 COVID-19            2020 Completed             [facility]

rs[facility] of



           MODERNA 12+ YRS            00:00:00                         [facility]

[facility]



           VACCINE                                                [facility]

 

           SARS-COV-2 COVID-19            2020 Completed             [facility]

rs[facility] of



           MODERNA 12+ YRS            00:00:00                         [facility]

[facility]



           VACCINE                                                [facility]

 

           SARS-COV-2 COVID-19            2020 Completed             [facility]

rsity of



           MODERNA 12+ YRS            00:00:00                         [facility]

[facility]



           VACCINE                                                [facility]

 

           SARS-COV-2 COVID-19            2020 Completed             [facility]

rsity of



           MODERNA 12+ YRS            00:00:00                         [facility]

[facility]



           VACCINE                                                [facility]

 

           SARS-COV-2 COVID-19            2020 Completed             [facility]

rs[facility] of



           MODERNA 12+ YRS            00:00:00                         [facility]

[facility]



           VACCINE                                                [facility]

 

           SARS-COV-2 COVID-19            2020 Completed             [facility]

rs[facility] of



           MODERNA 12+ YRS            00:00:00                         [facility]

[facility]



           VACCINE                                                Br[facility]







Vital Signs







             Vital Name   Observation Time Observation Value Comments     Source

 

             Systolic blood 2023 23:00:00 108 mm[Hg]                [facility]

[facility] of



             pressure                                            [facility]

 

             Diastolic blood 2023 23:00:00 64 mm[Hg]                 [facility]

[facility] of



             pressure                                            [facility]



                                                                 [place]

 

             Heart rate   2023 23:00:00 106 /min                  [facility]

 [facility]



                                                                 [facility]

 

             Respiratory rate 2023 23:00:00 16 /min                   [facility]

ersity of



                                                                 [facility]

 

             Oxygen saturation 2023 23:00:00 96 /min                   [facility]

[facility]



             in Arterial blood                                        [facility]

[facility]



             by Pulse oximetry                                        [facility]

 

             Body temperature 2023 19:25:00 36.78 Tiffanie                 [facility]

ersity of



                                                                 [facility]



                                                                 [facility]

 

             Body height  2023 19:25:00 149.9 cm                  [facility]

[facility]



                                                                 [facility]



                                                                 [facility]

 

             Body weight  2023 19:25:00 127.007 kg                [facility]

[facility]



                                                                 [facility]



                                                                 [facility]

 

             BMI          2023 19:25:00 56.55 kg/m2               [facility]

[facility] of



                                                                 [facility]



                                                                 [facility]

 

             Systolic blood 2023 23:00:00 119 mm[Hg]                [facility]

sity of



             pressure                                            [facility]



                                                                 [facility]

 

             Diastolic blood 2023 23:00:00 92 mm[Hg]                 [facility]

rsity of



             pressure                                            [facility]



                                                                 [facility]

 

             Heart rate   2023 23:00:00 94 /min                   [facility]

[facility] of



                                                                 [facility]



                                                                 [facility]

 

             Respiratory rate 2023 23:00:00 12 /min                   Univ

ersity of



                                                                 [facility]



                                                                 Branch

 

             Oxygen saturation 2023 23:00:00 99 /min                   [facility]

[facility] of



             in Arterial blood                                        [facility]

[facility]



             by Pulse oximetry                                        [facility]

 

             Body temperature 2023 19:36:00 37 Tiffanie                    [facility]

ersity of



                                                                 [facility]



                                                                 [facility]

 

             Body height  2023 19:36:00 162.6 cm                  [facility]

[facility] of



                                                                 [facility]



                                                                 [facility]

 

             Body weight  2023 19:36:00 117.935 kg                [facility]

[facility] of



                                                                 [facility]



                                                                 [facility]

 

             BMI          2023 19:36:00 44.63 kg/m2               [facility]

ty of



                                                                 [facility]



                                                                 [facility]

 

             Systolic blood 2023 18:21:00 114 mm[Hg]                [facility]

sity of



             pressure                                            [facility]



                                                                 [facility]

 

             Diastolic blood 2023 18:21:00 78 mm[Hg]                 [facility]

rsity of



             pressure                                            [facility]



                                                                 [facility]

 

             Heart rate   2023 18:21:00 103 /min                  [facility]

[facility] of



                                                                 [facility]



                                                                 [facility]

 

             Body weight  2023 18:21:00 117.935 kg   per patient  [facility]

[facility] of



                                                                 [facility]



                                                                 [facility]

 

             BMI          2023 18:21:00 44.63 kg/m2               [facility]

ty of



                                                                 [facility]



                                                                 [facility]

 

             Systolic blood 2023-04-10 02:00:00 148 mm[Hg]                [facility]

sity of



             pressure                                            [facility]



                                                                 [facility]

 

             Diastolic blood 2023-04-10 02:00:00 87 mm[Hg]                 [facility]

rsity of



             pressure                                            [facility]



                                                                 [facility]

 

             Heart rate   2023-04-10 02:00:00 92 /min                   [facility]

[facility] of



                                                                 [facility]



                                                                 [facility]

 

             Respiratory rate 2023-04-10 02:00:00 18 /min                   [facility]

ersity of



                                                                 [facility]



                                                                 [facility]

 

             Oxygen saturation 2023-04-10 02:00:00 99 /min                   [facility]

[facility] of



             in Arterial blood                                        [facility]

[facility]



             by Pulse oximetry                                        [facility]

 

             Body temperature 2023 22:01:00 36.72 Tiffanie                 [facility]

ersity of



                                                                 [facility]



                                                                 [facility]

 

             Body weight  2023 22:01:00 118.389 kg                [facility]

[facility] of



                                                                 [facility]



                                                                 [facility]

 

             BMI          2023 22:01:00 44.80 kg/m2               [facility]

ty of



                                                                 [facility]



                                                                 [facility]

 

             Systolic blood 2023 01:01:00 140 mm[Hg]                [facility]

sity of



             pressure                                            [facility]



                                                                 [facility]

 

             Diastolic blood 2023 01:01:00 86 mm[Hg]                 [facility]

rsity of



             pressure                                            [facility]



                                                                 [facility]

 

             Heart rate   2023 01:01:00 103 /min                  [facility]

[facility] of



                                                                 [facility]



                                                                 [facility]

 

             Respiratory rate 2023 01:01:00 19 /min                   [facility]

ersity of



                                                                 [facility]



                                                                 [facility]

 

             Oxygen saturation 2023 01:01:00 98 /min                   [facility]

[facility] of



             in Arterial blood                                        [facility]

[facility]



             by Pulse oximetry                                        [facility]

 

             Body temperature 2023 20:37:00 36.78 Tiffanie                 [facility]

ersity of



                                                                 [facility]



                                                                 [facility]

 

             Body height  2023 20:37:00 162.6 cm                  [facility]

[facility] of



                                                                 [facility]



                                                                 [facility]

 

             Body weight  2023 20:37:00 118.389 kg                [facility]

[facility] of



                                                                 [facility]



                                                                 [facility]

 

             BMI          2023 20:37:00 44.80 kg/m2               [facility]

[facility] of



                                                                 [facility]



                                                                 [facility]

 

             Systolic blood 2023 18:18:00 98 mm[Hg]                 [facility]

sity of



             pressure                                            [facility]



                                                                 [facility]

 

             Diastolic blood 2023 18:18:00 52 mm[Hg]                 [facility]

rsity of



             pressure                                            [facility]



                                                                 [facility]

 

             Heart rate   2023 18:18:00 93 /min                   [facility]

[facility] of



                                                                 [facility]



                                                                 [facility]

 

             Body temperature 2023 18:18:00 36.72 Tiffanie                 [facility]

ersity of



                                                                 [facility]



                                                                 [facility]

 

             Respiratory rate 2023 18:18:00 16 /min                   [facility]

ersity of



                                                                 [facility]



                                                                 [facility]

 

             Oxygen saturation 2023 18:18:00 93 /min                   [facility]

[facility] of



             in Arterial blood                                        [facility]

[facility]



             by Pulse oximetry                                        [facility]

 

             Body height  2023 09:34:00 149.9 cm                  [facility]

[facility] of



                                                                 [facility]



                                                                 [facility]

 

             Body weight  2023 09:34:00 118.389 kg                [facility]

[facility] of



                                                                 [facility]



                                                                 [facility]

 

             BMI          2023 09:34:00 52.72 kg/m2               [facility]

ty of



                                                                 [facility]



                                                                 [facility]

 

             Systolic blood 2023 21:30:00 129 mm[Hg]                [facility]

sity of



             pressure                                            [facility]



                                                                 [facility]

 

             Diastolic blood 2023 21:30:00 73 mm[Hg]                 [facility]

rsity of



             pressure                                            [facility]



                                                                 [facility]

 

             Heart rate   2023 21:30:00 96 /min                   [facility]

[facility] of



                                                                 [facility]



                                                                 [facility]

 

             Respiratory rate 2023 21:30:00 14 /min                   [facility]

ersity of



                                                                 [facility]



                                                                 [facility]

 

             Oxygen saturation 2023 21:30:00 95 /min                   [facility]

[facility]ity of



             in Arterial blood                                        [facility]

[facility]



             by Pulse oximetry                                        [facility]

 

             Body temperature 2023 20:53:00 36.56 Tiffanie                 [facility]

ersity of



                                                                 [facility]



                                                                 [facility]

 

             Body height  2023 09:34:00 149.9 cm                  [facility]

[facility] of



                                                                 [facility]



                                                                 [facility]

 

             Body weight  2023 09:34:00 118.389 kg                [facility]

[facility] of



                                                                 [facility]



                                                                 [facility]

 

             BMI          2023 09:34:00 52.72 kg/m2               [facility]

ty of



                                                                 [facility]



                                                                 [facility]

 

             Systolic blood 2023 22:09:00 139 mm[Hg]                [facility]

sity of



             pressure                                            [facility]



                                                                 [facility]

 

             Diastolic blood 2023 22:09:00 104 mm[Hg]                [facility]

rsity of



             pressure                                            [facility]



                                                                 [facility]

 

             Heart rate   2023 22:09:00 93 /min                   [facility]

[facility] of



                                                                 [facility]



                                                                 [facility]

 

             Respiratory rate 2023 22:09:00 18 /min                   [facility]

ersity of



                                                                 [facility]



                                                                 [facility]

 

             Oxygen saturation 2023 22:09:00 96 /min                   [facility]

versity of



             in Arterial blood                                        [facility]

[facility]



             by Pulse oximetry                                        [facility]

 

             Body temperature 2023 19:53:00 36.67 Tiffanie                 [facility]

ersity of



                                                                 [facility]



                                                                 [facility]

 

             Systolic blood 2023 03:00:00 132 mm[Hg]                [facility]

sity of



             pressure                                            [facility]



                                                                 [facility]

 

             Diastolic blood 2023 03:00:00 89 mm[Hg]                 [facility]

rsity of



             pressure                                            [facility]



                                                                 [facility]

 

             Heart rate   2023 03:00:00 91 /min                   [facility]

[facility] of



                                                                 [facility]



                                                                 [facility]

 

             Respiratory rate 2023 03:00:00 18 /min                   [facility]

ersity of



                                                                 [facility]



                                                                 [facility]

 

             Oxygen saturation 2023 03:00:00 96 /min                   [facility]

versity of



             in Arterial blood                                        [facility]

[facility]



             by Pulse oximetry                                        [facility]

 

             Body temperature 2023-02-10 22:12:00 37.06 Tiffanie                 [facility]

ersity of



                                                                 [facility]



                                                                 [facility]

 

             Body weight  2023-02-10 22:12:00 131.543 kg                [facility]

[facility] of



                                                                 [facility]



                                                                 [facility]

 

             BMI          2023-02-10 22:12:00 58.57 kg/m2               [facility]

ty of



                                                                 [facility]



                                                                 [facility]

 

             Systolic blood 2023-02-10 00:30:00 115 mm[Hg]                [facility]

sity of



             pressure                                            [facility]



                                                                 [facility]

 

             Diastolic blood 2023-02-10 00:30:00 78 mm[Hg]                 [facility]

rsity of



             pressure                                            [facility]



                                                                 [facility]

 

             Heart rate   2023-02-10 00:30:00 96 /min                   [facility]

ty of



                                                                 [facility]



                                                                 [facility]

 

             Respiratory rate 2023-02-10 00:30:00 18 /min                   [facility]

ersity of



                                                                 [facility]



                                                                 [facility]

 

             Oxygen saturation 2023-02-10 00:30:00 95 /min                   [facility]i

versity of



             in Arterial blood                                        [facility]

[facility]



             by Pulse oximetry                                        [facility]

 

             Body temperature 2023 21:14:00 35.89 Tiffanie                 [facility]

ersity of



                                                                 [facility]



                                                                 [facility]

 

             Body height  2023 21:14:00 149.9 cm                  [facility]

ty of



                                                                 [facility]



                                                                 [facility]

 

             Body weight  2023 21:14:00 127.461 kg                [facility]

[facility] of



                                                                 [facility]



                                                                 [facility]

 

             BMI          2023 21:14:00 56.76 kg/m2               [facility]

ty of



                                                                 [facility]



                                                                 [facility]

 

             Systolic blood 2023 01:13:00 119 mm[Hg]                [facility]

sity of



             pressure                                            [facility]



                                                                 [facility]

 

             Diastolic blood 2023 01:13:00 85 mm[Hg]                 [facility]

rsity of



             pressure                                            [facility]



                                                                 [facility]

 

             Heart rate   2023 01:13:00 97 /min                   [facility]

ty of



                                                                 [facility]



                                                                 [facility]

 

             Body temperature 2023 01:13:00 36.17 Tiffanie                 [facility]iv

ersity of



                                                                 [facility]



                                                                 [facility]

 

             Respiratory rate 2023 01:13:00 16 /min                   [facility]iv

ersity of



                                                                 [facility]



                                                                 [facility]

 

             Oxygen saturation 2023 01:13:00 94 /min                   [facility]

versity of



             in Arterial blood                                        [facility]

[facility]



             by Pulse oximetry                                        [facility]

 

             Body weight  2023 09:31:00 126.962 kg                [facility]

ty of



                                                                 [facility]



                                                                 [facility]

 

             BMI          2023 09:31:00 56.53 kg/m2               [facility]

ty of



                                                                 [facility]



                                                                 [facility]

 

             Systolic blood 2022 18:59:00 125 mm[Hg]                [facility]

sity of



             pressure                                            [facility]



                                                                 [facility]

 

             Diastolic blood 2022 18:59:00 84 mm[Hg]                 [facility]ive

rsity of



             pressure                                            [facility]



                                                                 [facility]

 

             Heart rate   2022 18:59:00 104 /min                  [facility]

ty of



                                                                 [facility]



                                                                 [facility]

 

             Respiratory rate 2022 18:59:00 18 /min                   [facility]iv

ersity of



                                                                 [facility]



                                                                 [facility]

 

             Body weight  2022 18:59:00 127.007 kg                [facility]

[facility] of



                                                                 [facility]



                                                                 [facility]

 

             BMI          2022 18:59:00 56.55 kg/m2               [facility]

ty of



                                                                 [facility]



                                                                 [facility]

 

             Oxygen saturation 2022 18:59:00 98 /min                   [facility]i

versity of



             in Arterial blood                                        [facility]

[facility]



             by Pulse oximetry                                        [facility]

 

             Systolic blood 2022 19:02:00 142 mm[Hg]                [facility]

sity of



             pressure                                            [facility]



                                                                 [facility]

 

             Diastolic blood 2022 19:02:00 99 mm[Hg]                 [facility]

rsity of



             pressure                                            [facility]



                                                                 [facility]

 

             Heart rate   2022 19:01:00 91 /min                   [facility]

[facility] of



                                                                 [facility]



                                                                 [facility]

 

             Systolic blood 2022 18:00:00 136 mm[Hg]                [facility]

sity of



             pressure                                            [facility]



                                                                 [facility]

 

             Diastolic blood 2022 18:00:00 92 mm[Hg]                 [facility]

rsity of



             pressure                                            [facility]



                                                                 [facility]

 

             Heart rate   2022 18:00:00 99 /min                   [facility]

ty of



                                                                 [facility]



                                                                 [facility]

 

             Body temperature 2022 18:00:00 35.83 Tiffanie                 Univ

ersity of



                                                                 [facility]



                                                                 [facility]

 

             Respiratory rate 2022 18:00:00 18 /min                   Univ

ersity of



                                                                 [facility]



                                                                 [facility]

 

             Oxygen saturation 2022 18:00:00 95 /min                   Uni

versity of



             in Arterial blood                                        [facility]

[facility]



             by Pulse oximetry                                        Branch

 

             Body weight  2022 10:45:00 135.988 kg                [facility]

ty of



                                                                 [facility]

 

             BMI          2022 10:45:00 60.55 kg/m2               Universi

ty of



                                                                 [facility]



                                                                 [facility]

 

             Body height  2022 02:02:00 149.9 cm                  Universi

ty of



                                                                 [facility]



                                                                 Branch

 

             Systolic blood 2022 01:11:00 166 mm[Hg]                Univer

sity of



             pressure                                            [facility]



                                                                 Branch

 

             Diastolic blood 2022 01:11:00 93 mm[Hg]                 Unive

rsity of



             [facility]



                                                                 Branch

 

             Heart rate   2022 01:09:00 106 /min                  Universi

ty of



                                                                 [facility]

 

             Body temperature 2022 01:09:00 36.89 Tiffanie                 Univ

ersity of



                                                                 [facility]



                                                                 [facility]anch

 

             Respiratory rate 2022 01:09:00 20 /min                   Univ

ersity of



                                                                 [facility]



                                                                 [facility]anch

 

             Body weight  2022 01:09:00 127.007 kg                Universi

ty of



                                                                 [facility]



                                                                 [facility]

 

             BMI          2022 01:09:00 56.55 kg/m2               Universi

ty of



                                                                 [facility]



                                                                 [facility]

 

             Oxygen saturation 2022 01:09:00 96 /min                   Uni

versity of



             in Arterial blood                                        [facility]

[facility]



             by Pulse oximetry                                        Branch

 

             Systolic blood 2022-10-30 03:00:00 138 mm[Hg]                Univer

sity of



             pressure                                            [facility]



                                                                 Branch

 

             Diastolic blood 2022-10-30 03:00:00 82 mm[Hg]                 Unive

rsity of



             pressure                                            [facility]



                                                                 [facility]

 

             Heart rate   2022-10-30 03:00:00 102 /min                  Universi

ty of



                                                                 [facility]



                                                                 [facility]

 

             Respiratory rate 2022-10-30 03:00:00 20 /min                   Univ

ersity of



                                                                 [facility]



                                                                 [facility]

 

             Oxygen saturation 2022-10-30 03:00:00 97 /min                   Uni

versity of



             in Arterial blood                                        [facility]

[facility]



             by Pulse oximetry                                        Branch

 

             Body temperature 2022-10-30 00:13:00 36.5 Tiffanie                  Univ

ersity of



                                                                 [facility]



                                                                 [facility]

 

             Body weight  2022-10-30 00:13:00 132.904 kg                Universi

ty of



                                                                 [facility]



                                                                 [facility]

 

             BMI          2022-10-30 00:13:00 59.18 kg/m2               Universi

ty of



                                                                 [facility]



                                                                 [facility]

 

             Systolic blood 2022-10-23 12:42:00 128 mm[Hg]                Univer

sity of



             pressure                                            [facility]



                                                                 [facility]

 

             Diastolic blood 2022-10-23 12:42:00 83 mm[Hg]                 Unive

rsity of



             pressure                                            [facility]



                                                                 [facility]

 

             Heart rate   2022-10-23 12:42:00 111 /min                  Universi

ty of



                                                                 [facility]



                                                                 [facility]

 

             Body temperature 2022-10-23 12:42:00 35.94 Tiffanie                 Univ

ersity of



                                                                 [facility]



                                                                 [facility]

 

             Respiratory rate 2022-10-23 12:42:00 18 /min                   Univ

ersity of



                                                                 [facility]



                                                                 [facility]

 

             Oxygen saturation 2022-10-23 12:42:00 95 /min                   Uni

versity of



             in Arterial blood                                        [facility]

[facility]



             by Pulse oximetry                                        Branch

 

             Body weight  2022-10-23 10:22:00 132.995 kg                Universi

ty of



                                                                 [facility]



                                                                 [facility]

 

             BMI          2022-10-23 10:22:00 59.22 kg/m2               Universi

ty of



                                                                 [facility]



                                                                 [facility]

 

             Body height  2022-10-21 11:00:00 149.9 cm                  Universi

ty of



                                                                 [facility]



                                                                 [facility]

 

             Systolic blood 2022 17:14:00 130 mm[Hg]                Univer

sity of



             pressure                                            [facility]



                                                                 [facility]

 

             Diastolic blood 2022 17:14:00 83 mm[Hg]                 Unive

rsity of



             pressure                                            [facility]



                                                                 [facility]

 

             Heart rate   2022 17:14:00 100 /min                  Universi

ty of



                                                                 [facility]



                                                                 [facility]

 

             Body temperature 2022 17:14:00 36.72 Tiffanie                 Univ

ersity of



                                                                 [facility]



                                                                 [facility]

 

             Respiratory rate 2022 17:14:00 20 /min                   Univ

ersity of



                                                                 [facility]



                                                                 [facility]

 

             Oxygen saturation 2022 17:14:00 96 /min                   Uni

versity of



             in Arterial blood                                        [facility]

[facility]



             by Pulse oximetry                                        Branch

 

             Body height  2022 10:00:00 149.9 cm                  Univers[facility]

ty of



                                                                 [facility]



                                                                 [facility]

 

             Body weight  2022 10:00:00 123 kg                    [facility]

ty of



                                                                 [facility]



                                                                 [facility]

 

             BMI          2022 10:00:00 54.77 kg/m2               Universi

ty of



                                                                 [facility]



                                                                 [facility]

 

             Systolic blood 2022 12:40:00 107 mm[Hg]                Univer

sity of



             pressure                                            [facility]



                                                                 [facility]

 

             Diastolic blood 2022 12:40:00 64 mm[Hg]                 Unive

rsity of



             pressure                                            [facility]



                                                                 [facility]

 

             Heart rate   2022 12:40:00 99 /min                   Universi

ty of



                                                                 [facility]



                                                                 [facility]

 

             Body temperature 2022 12:40:00 36.83 Tiffanie                 Univ

ersity of



                                                                 [facility]



                                                                 [facility]

 

             Respiratory rate 2022 12:40:00 18 /min                   Univ

ersity of



                                                                 [facility]



                                                                 [facility]

 

             Oxygen saturation 2022 12:40:00 95 /min                   Uni

versity of



             in Arterial blood                                        [facility]

[facility]



             by Pulse oximetry                                        Branch

 

             Body height  2022 10:00:00 149.9 cm                  Universi

ty of



                                                                 [facility]



                                                                 [facility]

 

             Body weight  2022 10:00:00 123 kg                    [facility]

ty of



                                                                 [facility]



                                                                 [facility]

 

             BMI          2022 10:00:00 54.77 kg/m2               Universi

ty of



                                                                 [facility]



                                                                 [facility]

 

             Heart rate   2022 23:00:00 91 /min                   Universi

ty of



                                                                 [facility]



                                                                 [facility]

 

             Oxygen saturation 2022 23:00:00 95 /min                   Uni

versity of



             in Arterial blood                                        [facility]

[facility]



             by Pulse oximetry                                        Branch

 

             Systolic blood 2022 22:02:00 134 mm[Hg]                Univer

sity of



             pressure                                            [facility]



                                                                 [facility]

 

             Diastolic blood 2022 22:02:00 83 mm[Hg]                 Unive

rsity of



             pressure                                            [facility]



                                                                 [facility]

 

             Body temperature 2022 21:00:00 36.83 Tiffanie                 Univ

ersity of



                                                                 [facility]



                                                                 [facility]

 

             Respiratory rate 2022 21:00:00 28 /min                   Univ

ersity of



                                                                 [facility]



                                                                 [facility]

 

             Body weight  2022 09:00:00 134.99 kg                 Univers[facility]

ty of



                                                                 [facility]



                                                                 [facility]

 

             BMI          2022 09:00:00 60.08 kg/m2               Universi

ty of



                                                                 [facility]



                                                                 [facility]

 

             Body height  2022 22:22:00 149.9 cm                  [facility]

ty of



                                                                 [facility]



                                                                 [facility]

 

             Systolic blood 2022 03:38:00 126 mm[Hg]                Univer

sity of



             pressure                                            [facility]



                                                                 [facility]

 

             Diastolic blood 2022 03:38:00 90 mm[Hg]                 Unive

rsity of



             pressure                                            [facility]



                                                                 [facility]

 

             Heart rate   2022 03:38:00 97 /min                   Univers[facility]

ty of



                                                                 [facility]



                                                                 [facility]

 

             Respiratory rate 2022 03:38:00 18 /min                   Univ

ersity of



                                                                 [facility]



                                                                 Branch

 

             Oxygen saturation 2022 03:38:00 97 /min                   Uni

versity of



             in Arterial blood                                        [facility]

[facility]



             by Pulse oximetry                                        Branch

 

             Body temperature 2022-07-15 22:09:00 36.94 Tiffanie                 Univ

ersity of



                                                                 [facility]



                                                                 Branch

 

             Body height  2022-07-15 22:09:00 149.9 cm                  Universi

ty of



                                                                 [facility]



                                                                 [facility]

 

             Body weight  2022-07-15 22:09:00 127.007 kg                Universi

ty of



                                                                 [facility]



                                                                 [facility]

 

             BMI          2022-07-15 22:09:00 56.55 kg/m2               Universi

ty of



                                                                 [facility]



                                                                 [facility]

 

             Systolic blood 2022 11:00:00 143 mm[Hg]                Univer

sity of



             [facility]



                                                                 Branch

 

             Diastolic blood 2022 11:00:00 91 mm[Hg]                 Unive

rsity of



             pressure                                            [facility]



                                                                 [facility]

 

             Heart rate   2022 11:00:00 100 /min                  Univers[facility]

ty of



                                                                 [facility]



                                                                 [facility]

 

             Respiratory rate 2022 11:00:00 16 /min                   Univ

ersity of



                                                                 [facility]



                                                                 Branch

 

             Oxygen saturation 2022 11:00:00 96 /min                   Uni

versity of



             in Arterial blood                                        [facility]

[facility]



             by Pulse oximetry                                        Branch

 

             Body temperature 2022 09:55:00 36.89 Tiffanie                 Univ

ersity of



                                                                 [facility]



                                                                 Branch

 

             Body height  2022 09:55:00 149.9 cm                  Universi

ty of



                                                                 [facility]



                                                                 [facility]

 

             Body weight  2022 09:55:00 127.007 kg                Universi

ty of



                                                                 [facility]



                                                                 [facility]

 

             BMI          2022 09:55:00 56.55 kg/m2               Universi

ty of



                                                                 [facility]



                                                                 [facility]

 

             Systolic blood 2022 00:43:00 132 mm[Hg]                Univer

sity of



             pressure                                            [facility]



                                                                 [facility]

 

             Diastolic blood 2022 00:43:00 88 mm[Hg]                 Unive

rsity of



             [facility]



                                                                 Branch

 

             Heart rate   2022 00:43:00 107 /min                  Universi

ty of



                                                                 [facility]



                                                                 Branch

 

             Body temperature 2022 00:43:00 36.33 Tiffanie                 Univ

ersity of



                                                                 [facility]



                                                                 [facility]

 

             Respiratory rate 2022 00:43:00 18 /min                   Univ

ersity of



                                                                 [facility]



                                                                 [facility]

 

             Oxygen saturation 2022 00:43:00 95 /min                   Uni

versity of



             in Arterial blood                                        [facility]

[facility]



             by Pulse oximetry                                        Branch

 

             Body height  2022 11:31:00 149.9 cm                  [facility]

ty of



                                                                 [facility]



                                                                 [facility]

 

             Body weight  2022 11:31:00 127.007 kg                Univers[facility]

ty of



                                                                 [facility]



                                                                 [facility]

 

             BMI          2022 11:31:00 56.55 kg/m2               Universi

ty of



                                                                 [facility]



                                                                 [facility]

 

             Body temperature 2022 16:10:00 36.22 Tiffanie                 Univ

ersity of



                                                                 [facility]



                                                                 [facility]

 

             Respiratory rate 2022 16:10:00 16 /min                   Univ

ersity of



                                                                 [facility]



                                                                 [facility]

 

             Oxygen saturation 2022 16:10:00 96 /min                   Uni

versity of



             in Arterial blood                                        [facility]

[facility]



             by Pulse oximetry                                        Branch

 

             Systolic blood 2022 16:10:00 127 mm[Hg]                Univer

sity of



             pressure                                            [facility]



                                                                 [facility]

 

             Diastolic blood 2022 16:10:00 84 mm[Hg]                 Unive

rsity of



             pressure                                            [facility]



                                                                 [facility]

 

             Heart rate   2022 16:10:00 98 /min                   Univers[facility]

ty of



                                                                 [facility]



                                                                 [facility]

 

             Body weight  2022 08:54:00 133.494 kg                Univers[facility]

ty of



                                                                 [facility]



                                                                 [facility]

 

             BMI          2022 08:54:00 50.52 kg/m2               Universi

ty of



                                                                 [facility]



                                                                 [facility]

 

             Body height  2022 03:01:00 162.6 cm                  Universi

ty of



                                                                 [facility]



                                                                 [facility]

 

             Systolic blood 2022-06-10 17:03:00 132 mm[Hg]                Univer

sity of



             pressure                                            [facility]



                                                                 [facility]

 

             Diastolic blood 2022-06-10 17:03:00 90 mm[Hg]                 Unive

rsity of



             pressure                                            [facility]



                                                                 [facility]

 

             Heart rate   2022-06-10 17:03:00 78 /min                   Universi

ty of



                                                                 [facility]



                                                                 [facility]

 

             Body temperature 2022-06-10 17:03:00 35.83 Tiffanie                 Univ

ersity of



                                                                 [facility]



                                                                 [facility]

 

             Respiratory rate 2022-06-10 17:03:00 14 /min                   Univ

ersity of



                                                                 [facility]



                                                                 [facility]

 

             Oxygen saturation 2022-06-10 17:03:00 93 /min                   Uni

versity of



             in Arterial blood                                        [facility]

sarah



             by Pulse oximetry                                        [facility]

 

             Body weight  2022-06-10 09:00:00 140.933 kg                Universi

ty of



                                                                 [facility]

 

             BMI          2022-06-10 09:00:00 62.75 kg/m2               Universi

ty of



                                                                 [facility]

 

             Body height  2022 12:47:00 149.9 cm                  Universi

ty of



                                                                 [facility]



                                                                 Branch

 

             Systolic blood 2022 20:40:00 125 mm[Hg]                Univer

sity of



             pressure                                            [facility]



                                                                 [facility]

 

             Diastolic blood 2022 20:40:00 75 mm[Hg]                 Unive

rsity of



             [facility]

 

             Heart rate   2022 20:40:00 99 /min                   Universi

ty of



                                                                 [facility]

 

             Body temperature 2022 20:40:00 36.67 Tiffanie                 Univ

ersity of



                                                                 [facility]

 

             Respiratory rate 2022 20:40:00 20 /min                   Univ

ersity of



                                                                 [facility]



                                                                 Branch

 

             Oxygen saturation 2022 20:40:00 93 /min                   Uni

versity of



             in Arterial blood                                        [facility]

sarah



             by Pulse oximetry                                        Branch

 

             Body weight  2022 22:13:00 137.485 kg                Universi

ty of



                                                                 [facility]

 

             BMI          2022 22:13:00 61.22 kg/m2               Universi

ty of



                                                                 [facility]

 

             Body height  2022 09:10:00 149.9 cm                  Universi

ty of



                                                                 [facility]



                                                                 Branch

 

             Systolic blood 2022 15:49:00 111 mm[Hg]                Univer

sity of



             [facility]

 

             Diastolic blood 2022 15:49:00 76 mm[Hg]                 Unive

rsity of



             [facility]

 

             Heart rate   2022 15:49:00 109 /min                  Universi

ty of



                                                                 [facility]



                                                                 Branch

 

             Body temperature 2022 15:49:00 36.06 Tiffanie                 Univ

ersity of



                                                                 [facility]



                                                                 Branch

 

             Respiratory rate 2022 15:49:00 18 /min                   Univ

ersity of



                                                                 [facility]



                                                                 Branch

 

             Oxygen saturation 2022 15:49:00 92 /min                   Uni

versity of



             in Arterial blood                                        [facility]

sarah



             by Pulse oximetry                                        [facility]

 

             Body weight  2022 10:47:00 139.481 kg                Universi

ty of



                                                                 [facility]



                                                                 [facility]

 

             BMI          2022 10:47:00 62.11 kg/m2               Universi

ty of



                                                                 [facility]



                                                                 [facility]anch

 

             Body height  2022 06:57:00 149.9 cm                  Universi

ty of



                                                                 [facility]



                                                                 [facility]

 

             Systolic blood 2022 06:00:00 144 mm[Hg]                Univer

sity of



             pressure                                            [facility]



                                                                 [facility]anch

 

             Diastolic blood 2022 06:00:00 93 mm[Hg]                 Unive

rsity of



             pressure                                            [facility]



                                                                 Branch

 

             Heart rate   2022 06:00:00 92 /min                   Universi

ty of



                                                                 [facility]



                                                                 [facility]

 

             Respiratory rate 2022 06:00:00 22 /min                   Univ

ersity of



                                                                 [facility]



                                                                 [facility]

 

             Oxygen saturation 2022 04:00:00 94 /min                   Uni

versity of



             in Arterial blood                                        [facility]

sarah



             by Pulse oximetry                                        [facility]

 

             Body temperature 2022 03:45:00 37.06 Tiffanie                 Univ

ersity of



                                                                 [facility]



                                                                 [facility]anch

 

             Body height  2022 03:45:00 149.9 cm                  Universi

ty of



                                                                 [facility]



                                                                 [facility]

 

             Body weight  2022 03:45:00 127.461 kg                Universi

ty of



                                                                 [facility]



                                                                 [facility]

 

             BMI          2022 03:45:00 56.76 kg/m2               Universi

ty of



                                                                 [facility]



                                                                 [facility]

 

             Systolic blood 2022 03:18:00 113 mm[Hg]                Univer

sity of



             pressure                                            [facility]



                                                                 [facility]

 

             Diastolic blood 2022 03:18:00 85 mm[Hg]                 Unive

rsity of



             pressure                                            [facility]



                                                                 [facility]

 

             Heart rate   2022 03:18:00 96 /min                   Universi

ty of



                                                                 [facility]



                                                                 [facility]

 

             Respiratory rate 2022 03:18:00 18 /min                   Univ

ersity of



                                                                 [facility]



                                                                 [facility]

 

             Oxygen saturation 2022 03:18:00 93 /min                   Uni

versity of



             in Arterial blood                                        [facility]

sarah



             by Pulse oximetry                                        [facility]

 

             Body temperature 2022 01:01:16 36.89 Tiffanie                 Univ

ersity of



                                                                 [facility]



                                                                 [facility]

 

             Body weight  2022 23:13:00 127.461 kg                Universi

ty of



                                                                 [facility]



                                                                 [facility]

 

             BMI          2022 23:13:00 56.76 kg/m2               Universi

ty of



                                                                 [facility]



                                                                 [facility]

 

             Systolic blood 2022 21:53:00 142 mm[Hg]                Univer

sity of



             pressure                                            [facility]



                                                                 [facility]

 

             Diastolic blood 2022 21:53:00 88 mm[Hg]                 Unive

rsity of



             pressure                                            [facility]



                                                                 [facility]

 

             Heart rate   2022 21:53:00 96 /min                   Universi

ty of



                                                                 [facility]



                                                                 [facility]

 

             Body temperature 2022 21:53:00 36.06 Tiffanie                 Univ

ersity of



                                                                 [facility]



                                                                 [facility]

 

             Respiratory rate 2022 21:53:00 18 /min                   Univ

ersity of



                                                                 [facility]



                                                                 [facility]

 

             Oxygen saturation 2022 21:53:00 96 /min                   Uni

versity of



             in Arterial blood                                        [facility]

sarah



             by Pulse oximetry                                        [facility]

 

             Body weight  2022 09:48:00 138.801 kg                Universi

ty of



                                                                 [facility]



                                                                 [facility]

 

             BMI          2022 09:48:00 61.80 kg/m2               Universi

ty of



                                                                 [facility]



                                                                 [facility]

 

             Body height  2022 10:27:00 149.9 cm                  Universi

ty of



                                                                 [facility]



                                                                 [facility]

 

             Systolic blood 2022 03:50:00 142 mm[Hg]                Univer

sity of



             pressure                                            [facility]



                                                                 [facility]

 

             Diastolic blood 2022 03:50:00 95 mm[Hg]                 Unive

rsity of



             pressure                                            [facility]



                                                                 [facility]

 

             Heart rate   2022 03:50:00 93 /min                   Universi

ty of



                                                                 [facility]



                                                                 [facility]

 

             Respiratory rate 2022 03:50:00 17 /min                   Univ

ersity of



                                                                 [facility]



                                                                 [facility]

 

             Oxygen saturation 2022 03:50:00 98 /min                   Uni

versity of



             in Arterial blood                                        [facility]

sarah



             by Pulse oximetry                                        [facility]

 

             Body temperature 2022 20:39:00 36.5 Tiffanie                  Univ

ersity of



                                                                 [facility]



                                                                 [facility]

 

             Body weight  2022 20:39:00 136.079 kg                Universi

ty of



                                                                 [facility]



                                                                 [facility]

 

             BMI          2022 20:39:00 60.59 kg/m2               Universi

ty of



                                                                 [facility]



                                                                 [facility]

 

             Systolic blood 2022 01:46:00 134 mm[Hg]                Univer

sity of



             pressure                                            [facility]



                                                                 [facility]

 

             Diastolic blood 2022 01:46:00 100 mm[Hg]                Unive

rsity of



             pressure                                            [facility]



                                                                 [facility]

 

             Heart rate   2022 01:46:00 102 /min                  Universi

ty of



                                                                 [facility]



                                                                 [facility]

 

             Respiratory rate 2022 01:46:00 22 /min                   [facility]

 

             Oxygen saturation 2022 01:46:00 96 /min                   Uni

versity of



             in Arterial blood                                        [facility]

sarah



             by Pulse oximetry                                        [facility]

 

             Body temperature 2022-01-15 20:52:00 36.67 Tiffanie                 [facility]

[facility]

 

             Body weight  2022-01-15 20:52:00 136.079 kg                Universi

ty [facility]

 

             BMI          2022-01-15 20:52:00 60.59 kg/m2               Universi

ty [facility]

 

             height       2021 11:20:00 59 [in_i]                 [facility]

 

             weight       2021 11:20:00 350 [lb_av]               [facility]

 

             temperature  2021 11:20:00 97.8 [degF]               [facility]

 

             bmi          2021 11:20:00 70.68 kg/m2               [facility]

 

             oximetry     2021 11:20:00 94 %                      [facility]

 

             blood pressure 2021 11:20:00 160 mm[Hg]                [facility]



             systolic                                            [facility]

 

             blood pressure 2021 11:20:00 80 mm[Hg]                 [facility]



             diastolic                                           [facility]

 

             Systolic blood 2021 23:30:00 175 mm[Hg]                Univer

sity of



             [facility]

 

             Diastolic blood 2021 23:30:00 107 mm[Hg]                Unive

rsity [facility]

 

             Heart rate   2021 23:30:00 81 /min                   [facility]

 

             Respiratory rate 2021 23:30:00 21 /min                   [facility]

 

             Oxygen saturation 2021 23:30:00 97 /min                   Uni

versity of



             in Arterial blood                                        [facility]

sarah



             by Pulse oximetry                                        [facility]

 

             Body weight  2021 21:55:00 136.079 kg                Universi

ty [facility]

 

             BMI          2021 21:55:00 60.59 kg/m2               [facility]

 

             Body temperature 2021 21:55:00 37.44 Tiffanie                 Univ

[facility]

 

             height       2021 13:00:00 59 [in_i]                 [facility]

[facility] -



                                                                 [facility]

 

             weight       2021 13:00:00 350 [lb_av]               [facility]

[facility]

 

             temperature  2021 13:00:00 96.8 [degF]               [facility]

[facility]

 

             bmi          2021 13:00:00 70.68 kg/m2               [facility]

[facility]

 

             oximetry     2021 13:00:00 96 %                      [facility]

[facility]

 

             blood pressure 2021 13:00:00 120 mm[Hg]                [facility]



             systolic                                            [facility]

 

             blood pressure 2021 13:00:00 77 mm[Hg]                 [facility]



             diastolic                                           [facility]

 

             height       2021 14:20:00 59 [in_i]                 [facility]

[facility]

 

             weight       2021 14:20:00 350 [lb_av]               [facility]

[facility]

 

             temperature  2021 14:20:00 95 [degF]                 [facility]

[facility]

 

             bmi          2021 14:20:00 70.68 kg/m2               [facility]

[facility]

 

             oximetry     2021 14:20:00 98 %                      [facility]

[facility] -



                                                                 [facility]

 

             blood pressure 2021 14:20:00 128 mm[Hg]                [facility]



             systolic                                            [facility]

 

             blood pressure 2021 14:20:00 82 mm[Hg]                 [facility]



             diastolic                                           [facility]

 

             height       2021 13:40:00 59 [in_i]                 [facility]

[facility]

 

             weight       2021 13:40:00 350 [lb_av]               [facility]

[facility]

 

             temperature  2021 13:40:00 97.4 [degF]               [facility]

[facility] -



                                                                 [facility]

 

             bmi          2021 13:40:00 70.68 kg/m2               [facility]



                                                                 [facility]

 

             oximetry     2021 13:40:00 91 %                      [facility]

[facility] -



                                                                 [facility]

 

             blood pressure 2021 13:40:00 132 mm[Hg]                [facility]



             systolic                                            [facility]

 

             blood pressure 2021 13:40:00 87 mm[Hg]                 [facility]



             diastolic                                           [facility]

 

             Systolic blood 2021-05-10 01:30:00 163 mm[Hg]                Univer

sity of



             [facility]

 

             Diastolic blood 2021-05-10 01:30:00 106 mm[Hg]                [facility]



             [facility]

 

             Heart rate   2021-05-10 01:30:00 97 /min                   Universi

ty [facility]

 

             Respiratory rate 2021-05-10 01:30:00 21 /min                   Univ

ersity of



                                                                 [facility]

 

             Oxygen saturation 2021-05-10 01:30:00 97 /min                   Uni

versity of



             in Arterial blood                                        [facility]

sarah



             by Pulse oximetry                                        [facility]

 

             Body temperature 2021 23:27:00 36.83 Tiffanie                 [facility]

[facility]

 

             Body height  2021 23:27:00 149.9 cm                  [facility]

 

             Body weight  2021 23:27:00 136.079 kg                [facility]

 

             BMI          2021 23:27:00 60.59 kg/m2               [facility]

 

             Systolic blood 2021-05-10 01:30:00 163 mm[Hg]                Univer

sity of



             [facility]

 

             Diastolic blood 2021-05-10 01:30:00 106 mm[Hg]                Unive

rsity of



             [facility]

 

             Heart rate   2021-05-10 01:30:00 97 /min                   Univers

ty [facility]

 

             Respiratory rate 2021-05-10 01:30:00 21 /min                   Univ

ersity of



                                                                 [facility]

 

             Oxygen saturation 2021-05-10 01:30:00 97 /min                   Uni

versity of



             in Arterial blood                                        [facility]

sarah



             by Pulse oximetry                                        [facility]

 

             Body temperature 2021 23:27:00 36.83 Tiffanie                 [facility]

[facility]

 

             Body height  2021 23:27:00 149.9 cm                  Universi

[facility]

 

             Body weight  2021 23:27:00 136.079 kg                [facility]

 

             BMI          2021 23:27:00 60.59 kg/m2               [facility]

[facility]

 

             Systolic blood 2022 16:49:00 127 mm[Hg]                Univer

sity of



             pressure                                            [facility]

 

             Diastolic blood 2022 16:49:00 86 mm[Hg]                 Unive

rsity of



             pressure                                            [facility]

 

             Heart rate   2022 16:49:00 101 /min                  [facility]

[facility]

 

             Body temperature 2022 16:49:00 36.83 Tiffanie                 [facility]

 

             Respiratory rate 2022 16:49:00 20 /min                   [facility]

 

             Oxygen saturation 2022 16:49:00 95 /min                   Uni

versity of



             in Arterial blood                                        [facility]

sarah



             by Pulse oximetry                                        [place]

 

             Body height  2022 10:00:00 149.9 cm                  [facility]

[facility]

 

             Body weight  2022 10:00:00 123 kg                    [facility]

 

             BMI          2022 10:00:00 54.77 kg/m2               [facility]

 

             Temperature Oral 2022 21:12:00 98.0 F                    [facility]

rial Jose



             (F)                                                 

 

             Heart Rate   2022 21:12:00                           [facility]

 [facility]

 

             Respitory Rate 2022 21:12:00                           [facility]

al [place]

 

             Systolic (mm Hg) 2022 21:12:00                           [facility]

[facility]

 

             Diastolic (mm Hg) 2022 21:12:00                           [facility]

[facility]

 

             Heart Rate   2022 17:09:26                           [facility]

 [facility]

 

             Respitory Rate 2022 17:09:26                           [facility]

al [place]

 

             Temperature Oral 2022 17:09:08 98.2 F                    [facility]

[facility]



             (F)                                                 

 

             Systolic (mm Hg) 2022 17:08:50                           [facility]

rial Jose

 

             Diastolic (mm Hg) 2022 17:08:50                           [facility]

[facility]

 

             Heart Rate   2022 17:08:50                           [facility]

 [facility]

 

             Respitory Rate 2022 13:07:22                           [facility]

al Jsoe

 

             Temperature Oral 2022 13:07:04 97.7 F                    [facility]

rial [facility]



             (F)                                                 

 

             Systolic (mm Hg) 2022 13:06:57                           [facility]

rial Jose

 

             Diastolic (mm Hg) 2022 13:06:57                           [facility]

[facility]

 

             Heart Rate   2022 10:00:21                           [facility]

 [facility]

 

             Respitory Rate 2022 10:00:21                           [facility]

al [facility]

 

             Temperature Oral 2022 09:59:28 97.5 F                    [facility]

rial [facility]



             (F)                                                 

 

             Systolic (mm Hg) 2022 09:58:18                           [facility]

rial Jose

 

             Diastolic (mm Hg) 2022 09:58:18                           [facility]

[facility]

 

             Heart Rate   2022 09:58:18                           [facility]

 [facility]

 

             Heart Rate   2022 04:58:41                           [facility]

 [facility]

 

             Respitory Rate 2022 04:58:41                           [facility]

al [facility]

 

             Temperature Oral 2022 04:58:34 97.2 F                    [facility]

rial [facility]



             (F)                                                 

 

             Systolic (mm Hg) 2022 04:57:48                           [facility]

rial [facility]

 

             Diastolic (mm Hg) 2022 04:57:48                           [facility]

[facility]

 

             Respitory Rate 2022 00:54:28                           [facility]

al [facility]

 

             Systolic (mm Hg) 2022 00:54:19                           [facility]

rial [facility]

 

             Diastolic (mm Hg) 2022 00:54:19                           [facility]

[facility]

 

             Temperature Oral 2022 00:53:24 98.1 F                    [facility]

rial Jose



             (F)                                                 

 

             Height       2022 23:39:00 149.86 cm                 [facility]

 [facility]

 

             Weight       2022 23:39:00                           [facility]

 [facility]

 

             BMI Calculated 2022 23:39:00                           [facility]

al [facility]

 

             Systolic (mm Hg) 2022 14:00:00                           [facility]

[facility] Jose

 

             Diastolic (mm Hg) 2022 14:00:00                           [facility]

[facility]

 

             Respitory Rate 2022 14:00:00                           [facility]

al [facility]

 

             Respitory Rate 2022 13:00:00                           [facility]

al [facility]

 

             Systolic (mm Hg) 2022 13:00:00                           [facility]

rial Jose

 

             Diastolic (mm Hg) 2022 13:00:00                           [facility]

[facility]

 

             Respitory Rate 2022 12:00:00                           [facility]

al [place]

 

             Systolic (mm Hg) 2022 12:00:00                           [facility]

rial [facility]

 

             Diastolic (mm Hg) 2022 12:00:00                           [facility]

[facility]

 

             Respitory Rate 2022 05:00:00                           [facility]

al [facility]

 

             Systolic (mm Hg) 2022 05:00:00                           [facility]

rial [facility]

 

             Diastolic (mm Hg) 2022 05:00:00                           [facility]

[facility]

 

             Respitory Rate 2022 04:00:00                           [facility]

al [place]

 

             Systolic (mm Hg) 2022 04:00:00                           [facility]

rial [facility]

 

             Diastolic (mm Hg) 2022 04:00:00                           [facility]

[facility]

 

             Respitory Rate 2022 03:00:00                           [facility]

al [facility]

 

             Systolic (mm Hg) 2022 03:00:00                           [facility]

rial [facility]

 

             Diastolic (mm Hg) 2022 03:00:00                           [facility]

[facility]

 

             Heart Rate   2022 15:55:41                           [facility]

 [facility]

 

             Temperature Oral 2022 15:55:32 98.3 F                    [facility]

[facility]



             (F)                                                 

 

             Heart Rate   2022 15:54:37                           [facility]

 [facility]

 

             Heart Rate   2022 12:20:12                           [facility]

 [facility]

 

             Temperature Oral 2022 12:19:51 97.7 F                    [facility]

rial [facility]



             (F)                                                 

 

             Temperature Oral 2022 09:20:54 97.9 F                    [facility]

rial Jose



             (F)                                                 

 

             Height       2022 06:18:00 149.86 cm                 [facility]

 [facility]

 

             Weight       2022 06:18:00                           [facility]

 [facility]

 

             BMI Calculated 2022 06:18:00                           [facility]

al [facility]

 

             Respitory Rate 2018 17:30:00                           [facility]

al [facility]

 

             Systolic (mm Hg) 2018 17:30:00                           [facility]

rial [facility]

 

             Diastolic (mm Hg) 2018 17:30:00                           [facility]

[facility]

 

             Temperature Oral 2018 17:30:00 98.4 F                    [facility]

rial [facility]



             (F)                                                 

 

             Temperature Oral 2018 16:23:00 98.6 F                    [facility]

rial [facility]



             (F)                                                 

 

             Systolic (mm Hg) 2018 16:23:00                           [facility]

rial [facility]

 

             Diastolic (mm Hg) 2018 16:23:00                           [facility]

[facility]

 

             Respitory Rate 2018 14:00:00                           [facility]

al [place]

 

             Systolic (mm Hg) 2018 14:00:00                           [facility]

rial [facility]

 

             Diastolic (mm Hg) 2018 14:00:00                           [facility]

[facility]

 

             Respitory Rate 2018 13:30:00                           [facility]

al [place]

 

             BMI Calculated 2018 10:16:00                           [facility]

al [facility]

 

             Height       2018 10:16:00 149.86 cm                 [facility]

 [facility]

 

             Weight       2018 10:16:00                           [facility]

 [facility]

 

             Heart Rate   2018 10:16:00                           [facility]

 [facility]

 

             Temperature Oral 2018 10:16:00 97.9 F                    [facility]

rial [facility]



             (F)                                                 

 

             Temperature Oral 2018 13:40:00 97.2 F                    [facility]

rial [facility]



             (F)                                                 

 

             Systolic (mm Hg) 2018 13:40:00                           [facility]

rial Jose

 

             Diastolic (mm Hg) 2018 13:40:00                           [facility]

[facility]

 

             Heart Rate   2018 13:40:00                           [facility]

 [facility]

 

             Respitory Rate 2018 13:40:00                           [facility]

al [facility]

 

             Heart Rate   2018 05:24:00                           [facility]

 [facility]

 

             Systolic (mm Hg) 2018 05:24:00                           [facility]

rial [facility]

 

             Diastolic (mm Hg) 2018 05:24:00                           [facility]

[facility]

 

             Respitory Rate 2018 05:24:00                           [facility]

al [facility]

 

             Temperature Oral 2018 05:24:00 98.1 F                    [facility]ace bool Jose



             (F)                                                 

 

             Respitory Rate 2018 01:15:00                           [facility]

al [facility]

 

             Systolic (mm Hg) 2018 01:15:00                           [facility]

rial Jose

 

             Diastolic (mm Hg) 2018 01:15:00                           [facility]

[facility]

 

             Heart Rate   2018 01:15:00                           [facility]

 [facility]

 

             Temperature Oral 2018 01:15:00 98.1 F                    [facility]

rajeevl Roe



             (F)                                                 

 

             Weight       2018 14:00:00                           [facility]

 [facility]

 

             Weight       2018 14:00:00                           [facility]

 [facility]

 

             Weight       2018 14:00:00                           [facility]

 [facility]

 

             BMI Calculated 2018 05:43:00                           [facility]

al [facility]

 

             Height       2018 05:43:00 162.56 cm                 [facility]

 

             Height       2018 22:41:00 149.86 cm                 [facility]

 [facility]

 

             BMI Calculated 2018 22:41:00                           [facility]

al Jose







Procedures







                Procedure       Date / Time     Performing Clinician Source



                                Performed                       

 

                POCT GLUCOSE(AGE >30DAYS) 2023 20:33:00 Schoenstein, Lynda

 [facility]

 

                COMP. METABOLIC PANEL 2023 20:32:00 Schoenstein, Lynda [facility]

[facility] [place]



                ([id number]

 

                CBC WITH DIFF   2023 20:32:00 Schoenstein, [facility]

 

                URINALYSIS      2023 20:32:00 Schoenstein, [facility]

 

                INSURANCE CORRESPONDENCE 2023 05:01:00 Doctor Unassigned, 

No [facility]

 

                POCT GLUCOSE (AUTOMATED) 2023 22:41:00 Radha Fofana [facility]

 

                LIPASE          2023 21:39:00 Radha Fofana [facility]

 

                COMP. METABOLIC PANEL 2023 21:39:00 Radha Fofana [facility]



                ([id number])                                         [facility]

 

                AC PANEL 21 + LACTIC ACID 2023 20:49:00 Radha Fofana [facility]

 

                CBC WITH DIFF   2023 20:48:00 Radha Fofana [facility]

 

                URINALYSIS      2023 20:48:00 Radha Fofana [facility]

 

                POCT GLUCOSE (AUTOMATED) 2023 19:37:00 Radha Fofana [facility]

 

                POCT GLUCOSE (AUTOMATED) 2023-04-10 02:38:00 Neeta Maria Un

[facility]

 

                URINALYSIS      2023 23:37:00 Neeta Maria [facility]

 

                COMP. METABOLIC PANEL 2023 23:12:00 Neeta Maria [facility]

[facility] [place]



                ([id number])                                         [facility]

 

                CBC WITH DIFF   2023 23:12:00 Neeta Maria [facility]

 

                AC PANEL 20 + LACTIC ACID 2023 23:10:00 Neeta Maria U

[facility]

 

                URINE CULTURE   2023 19:14:00 Surekha Huynh [facility]

 

                XR SKULL <4 VW  2023 00:48:18 Bossman Villa [facility]

 

                POCT GLUCOSE (AUTOMATED) 2023 00:14:00 Bossman Villa

 [facility]

 

                URINALYSIS      2023 23:13:00 Bossman Villa [facility]

 

                XR CHEST 1 VW   2023 22:46:00 Bossman Villa [facility]

 

                POCT GLUCOSE (AUTOMATED) 2023 22:34:00 Bossman Villa

 [facility]

 

                CT ANGIOGRAM    2023 22:21:20 Bossman Villa [facility] [place]



                ABDOMEN/PELVIS                                  [facility]

 

                LACTIC ACID WHOLE BLOOD 2023 21:48:00 Bossman Villa 

[facility]

 

                LIPASE          2023 21:47:00 Bossman Villa [facility]

 

                MAGNESIUM       2023 21:47:00 Bossman Villa [facility]

 

                COMP. METABOLIC PANEL 2023 21:47:00 Bossman Villa [facility]



                ([id number])                                         Medical Branch

 

                CBC WITH DIFF   2023 21:47:00 Bossman Villa [facility]

 

                COVID-19 (ID NOW RAPID 2023 21:47:00 Bossman Villa U

[facility]



                TESTING)                                        [facility]

 

                POCT GLUCOSE (AUTOMATED) 2023 18:22:00 Ruddy [facility]

 

                POCT GLUCOSE (AUTOMATED) 2023 18:22:00 Ruddy [facility]

 

                POCT GLUCOSE (AUTOMATED) 2023 13:41:00 Ruddy [facility]

 

                POCT GLUCOSE (AUTOMATED) 2023 13:41:00 Ruddy [facility]

 

                BASIC METABOLIC PANEL 2023 11:02:00 Donald Fox Uni

[facility] [place]



                (NA, K, CL, CO2, GLUCOSE,                                 Medica

l Branch



                BUN, CREATININE, CA)                                 

 

                CBC WITHOUT DIFF 2023 11:02:00 Donald Fox [facility]

 

                BASIC METABOLIC PANEL 2023 11:02:00 Donald Fox Uni

[facility] [place]



                (NA, K, CL, CO2, GLUCOSE,                                 Medica

l Branch



                BUN, CREATININE, CA)                                 

 

                CBC WITHOUT DIFF 2023 11:02:00 Donald Fox [facility]

 

                POCT GLUCOSE (AUTOMATED) 2023 02:49:00 Ruddy [facility]

 

                POCT GLUCOSE (AUTOMATED) 2023 02:49:00 Ruddy [facility]

 

                POCT GLUCOSE (AUTOMATED) 2023 22:57:00 Ruddy [facility]

 

                POCT GLUCOSE (AUTOMATED) 2023 22:57:00 Ruddy [facility]

 

                POCT GL[facility]COSE (AUTOMATED) [facility]23 [facility]

 

                [facility] ([facility]OM[facility]) [facility]23 [facility]

 

                [facility]



                CULTURE(AEROBIC/ANAEROBIC                                 Medica

l [place]



                )                                               

 

                FUNGUS (ROUTINE) CULTURE 2023 20:31:00 Vikash Anglin [facility]

 

                TISSUE          2023 20:31:00 Ruddy [facility]



                CULTURE(AEROBIC/ANAEROBIC                                 Medica

l [place]



                )                                               

 

                FUNGUS (ROUTINE) CULTURE 2023 20:31:00 Vikash Anglin [facility]

 

                WOUND DEBRIDEMENT 2023 19:57:00 [facility]

 

                WOUND DEBRIDEMENT 2023 19:57:00 [facility]

 

                POCT GLUCOSE (AUTOMATED) 2023 18:17:00 Ruddy [facility]

 

                POCT GLUCOSE (AUTOMATED) 2023 18:17:00 Ruddy [facility]

 

                POCT GLUCOSE (AUTOMATED) 2023 15:20:00 Ruddy [facility]

 

                POCT GLUCOSE (AUTOMATED) 2023 15:20:00 Ruddy [facility]

 

                POCT GLUCOSE (AUTOMATED) 2023 03:10:00 Ruddy [facility]

 

                POCT GLUCOSE (AUTOMATED) 2023 03:10:00 Ruddy [facility]

 

                POCT GLUCOSE (AUTOMATED) 2023 22:42:00 Ruddy [facility]

 

                POCT GLUCOSE (AUTOMATED) 2023 22:42:00 Ruddy [facility]

 

                POCT GLUCOSE (AUTOMATED) 2023 17:34:00 Noe Phillips

[facility]

 

                POCT GLUCOSE (AUTOMATED) 2023 17:34:00 Noe Phillips

[facility]

 

                POCT GLUCOSE (AUTOMATED) 2023 16:41:00 Noe Phillips

[facility]

 

                POCT GLUCOSE (AUTOMATED) 2023 16:41:00 Noe Phillips

[facility]

 

                POCT GLUCOSE (AUTOMATED) 2023 13:15:00 [facility]

 

                POCT GLUCOSE (AUTOMATED) 2023 13:15:00 PhillipsNoe valle

[facility]

 

                POCT GLUCOSE (AUTOMATED) 2023 07:38:00 [address] [facility]

 

                POCT GLUCOSE (AUTOMATED) 2023 07:38:00 [address] [facility]

 

                POCT GLUCOSE (AUTOMATED) 2023 06:43:00 Dave[address] [facility]

 

                POCT GLUCOSE (AUTOMATED) 2023 06:43:00 Davey [facility]

 

                BASIC METABOLIC PANEL 2023 06:03:00 [facility]cal

[facility]



                (NA, K, CL, CO2, GLUCOSE,                                 Medica

l Branch



                BUN, CREATININE, CA)                                 

 

                BASIC METABOLIC PANEL 2023 06:03:00 Yoni Mariscal

[facility]



                (NA, K, CL, CO2, GLUCOSE,                                 Medica

l Branch



                BUN, CREATININE, CA)                                 

 

                POCT GLUCOSE (AUTOMATED) 2023 05:32:00 Davey [facility]

 

                POCT GLUCOSE (AUTOMATED) 2023 05:32:00 Davey [facility]

 

                URINE CULTURE   2023 04:20:00 Davey [facility]

 

                URINE CULTURE   2023 04:20:00 Davey [facility]

 

                AC PANEL 21 + LACTIC ACID 2023 04:18:00 Toro Mariscal [facility]

 

                AC PANEL 21 + LACTIC ACID 2023 04:18:00 Toro Mariscal [facility]

 

                BASIC METABOLIC PANEL 2023 02:48:00 Yoni Mariscal [facility]



                (NA, K, CL, CO2, GLUCOSE,                                 Medica

l Branch



                BUN, CREATININE, CA)                                 

 

                CBC WITH DIFF   2023 02:48:00 [place], [facility]

 

                [facility]YSIS      2023 02:48[facility]

 

                BASIC METABOLIC PANEL 2023 02:48:00 Davey Janitrevin [facility]



                (NA, K, CL, CO2, GLUCOSE,                                 Medica

l Branch



                BUN, CREATININE, CA)                                 

 

                CBC WITH DIFF   2023 02:48:00 [place], [facility]

 

                URINALYSIS      2023 02:48:00 Davey [facility]

 

                POCT GLUCOSE (AUTOMATED) 2023 02:37:00 Bossman Villa

 [facility]

 

                LIPASE          2023 00:31:00 Bossman Villa [facility]

 

                COMP. METABOLIC PANEL 2023 00:31:00 Bossman Villa [facility]



                ([id number])                                         [facility]

 

                URINALYSIS      2023-02-10 23:49:00 Bossman Villa [facility]

 

                CBC WITH DIFF   2023-02-10 23:43:00 Bossman Villa [facility]

 

                AC PANEL 21 + LACTIC ACID 2023-02-10 00:00:00 Inocencia Golden

 [facility]

 

                POCT GLUCOSE (AUTOMATED) 2023 23:07:00 Inocencia Golden 

[facility]

 

                POCT GLUCOSE (AUTOMATED) 2023 22:13:00 Inocencia Golden 

[facility]

 

                MAGNESIUM       2023 21:40:00 Inocencia Golden [facility]

 

                COMP. METABOLIC PANEL 2023 21:40:00 Inocencia Golden [facility]



                ([id number])                                         Medical Branch

 

                CBC WITH DIFF   2023 21:40:00 Inocencia Golden [facility]

 

                POCT GLUCOSE (AUTOMATED) 2023 21:13:00 Inocencia Golden 

[facility]

 

                POCT GLUCOSE (AUTOMATED) 2023 23:54:00 Rand Dong  [facility]

 

                POCT GLUCOSE (AUTOMATED) 2023 17:33:00 Rand Dong  [facility]

 

                POCT GLUCOSE (AUTOMATED) 2023 13:57:00 Rand Dong  [facility]

 

                PHOSPHORUS      2023 11:34:00 Rand Dong  [facility]

 

                MAGNESIUM       2023 11:34:00 Lea [facility]

 

                FREE T4         2023 11:34:00 Lea reymundo  [facility]

 

                THYROID STIMULATING 2023 11:34:00 Rand Dong  [facility]



                HORMONE                                         [facility]

 

                COMP. METABOLIC PANEL 2023 11:34:00 Rand Dong  [facility]



                ([id number])                                         [facility]

 

                LIPID PANEL ([id number])(TOTAL 2023 11:34:00 Rand Dong  [facility]



                CHOLESTEROL,                                    [facility]



                TRIGLYCERIDES, HDL)                                 

 

                CBC WITH DIFF   2023 11:34:00 Rand Dong  [facility]

 

                GLYCOSYLATED HEMOGLOBIN 2023 11:34:00 Rand Dong  [facility]

[facility] [place]



                (A1C)                                           [facility]

 

                N-TERMINAL PRO-BNP 2023 11:34:00 Rand oDng  [facility]

 

                FREE T3         2023 11:34:00 Lea reymundo  [facility]

 

                POCT GLUCOSE (AUTOMATED) 2023 09:33:00 Rand Dong  [facility]

 

                POCT GLUCOSE (AUTOMATED) 2023 02:19:00 Radha Fofana [facility]

 

                POCT GLUCOSE (AUTOMATED) 2023 01:09:00 Radha Fofana [facility]

 

                POCT GLUCOSE (AUTOMATED) 2023 23:44:00 Radha Fofana [facility]

 

                CT ABDOMEN PELVIS W 2023 23:40:00 Radha Fofana [facility]



                CONTRAST                                        [facility]

 

                URINALYSIS      2023 22:21:00 Radha Fofana [facility]

 

                POCT GLUCOSE (AUTOMATED) 2023 22:19:00 Radha Fofana [facility]

 

                AC PANEL 20 + LACTIC ACID 2023 20:54:00 Rad[facility]ofana 

[facility]

 

                LIPASE          2023 20:41:00 Radha Fofana [facility]

 

                TROPONIN I      2023 20:41:00 Radha Fofana [facility]

 

                COMP. METABOLIC PANEL 2023 20:41:00 Radha Fofana [facility]



                ([id number])                                         [facility]

 

                CBC WITH DIFF   2023 20:41:00 Radha Fofana [facility]

 

                N-TERMINAL PRO-BNP 2023 20:41:00 Radha Fofana [facility]

 

                HB ECG ROUTINE & RHYTHM 2023 20:37:16 Radha Fofana [facility]

 

                EMERGENCY DEPARTMENT 2023 06:01:00 Doctor Unassigned, No [facility]



                DOCUMENTS                       [facility]

 

                PATIENT QUESTIONNAIRE 2022 06:01:00 Doctor Unassigned, No 

[facility]

 

                POCT HEMOGLOBIN A1C TEST 2022 19:04:00 Mary Anne Ramirez         [facility]

 

                POCT GLUCOSE (AUTOMATED) 2022 22:58:00 Edwardo Lopez   [facility]

 

                POCT GLUCOSE (AUTOMATED) 2022 18:00:00 Edwardo Lopez   [facility]

 

                POCT GLUCOSE (AUTOMATED) 2022 13:46:00 Edwardo Lopez   [facility]

 

                BASIC METABOLIC PANEL 2022 10:49:00 Livan Lui  [facility]

[facility] [place]



                (NA, K, CL, CO2, GLUCOSE,                                 Medica

l Branch



                BUN, CREATININE, CA)                                 

 

                CBC [facility] DIFF   2022 [facility]49[facility]00 [facility]

 

                LACTIC ACID WHOLE BLOOD 2022 02:31[facility]00 [facility]

 

                MRSA / MSSA SCREEN BY 2022 02:31:00 Edwardo Lopez   [facility]



                PCR, [facility]

 

                POCT GLUCOSE (AUTOMATED) 2022 02:09:00 Edwardo Lopez   [facility]

 

                BLOOD CULTURE SCREEN 2022 00:35:00 Neeta Maria [facility]

 

                URINALYSIS      2022 23:26:00 Neeta Maria [facility]

 

                BLOOD CULTURE SCREEN 2022 23:15:00 Neeta Maria [facility]

 

                COMP. METABOLIC PANEL 2022 23:08:00 Neeta Maria [facility]

[facility] [place]



                ([id number])                                         [facility]

 

                CBC WITH DIFF   2022 23:08:00 Neeta Maria [facility]

 

                LACTIC ACID WHOLE BLOOD 2022 23:07:00 Neeta Maria [facility]

 

                CT ABDOMEN PELVIS W 2022-10-30 02:06:00 Silvino Garay [facility]



                CONTRAST                                        [facility]

 

                LIPASE          2022-10-30 00:59:00 Silvino Garay [facility]

 

                COMP. METABOLIC PANEL 2022-10-30 00:59:00 Silvino Garay [facility]



                ([id number])                                         [facility]

 

                CBC WITH DIFF   2022-10-30 00:59:00 Silvino Garay [facility]

 

                URINALYSIS      2022-10-30 00:49:00 Silvino Garay [facility]

 

                POCT GLUCOSE (AUTOMATED) 2022-10-23 13:14:00 Rachel Bailey  [facility]

 

                POCT GLUCOSE (AUTOMATED) 2022-10-23 01:14:00 Rachel Bailey  [facility]

 

                POCT GLUCOSE (AUTOMATED) 2022-10-22 21:19:00 Rachel Bailey  [facility]

 

                BASIC METABOLIC PANEL 2022-10-22 11:23:00 Marcelle [facility]



                (NA, K, CL, CO2, GLUCOSE,                                 Medica

l Branch



                BUN, CREATININE, CA)                                 

 

                CBC WITH DIFF   2022-10-22 11:16:00 Marcelle [facility] o

f [facility]

 

                MRSA / MSSA SCREEN BY 2022-10-22 01:13:00 Edwardo Lopez   [facility]



                PCR, [facility]

 

                POCT GLUCOSE (AUTOMATED) 2022-10-22 01:11:00 Marcelle [facility]

 

                POCT GLUCOSE (AUTOMATED) 2022-10-21 21:32:00 Marcelle [facility]

 

                URINE CULTURE   2022-10-21 16:17:00 Marcelle [facility] o

f [facility]

 

                POCT GLUCOSE (AUTOMATED) 2022-10-21 16:08:00 Marcelle [facility]

 

                POCT GLUCOSE (AUTOMATED) 2022-10-21 12:43:00 Marcelle [facility]

 

                ASPIRATE OR ABSCESS 2022-10-21 07:35:00 Davey [facility]

[facility] [place]



                CULTURE(AEROBIC/ANAEROBIC                                 Medica

l Branch



                )                                               

 

                URINALYSIS      2022-10-21 07:32:00 Davey [facility]

 

                CT ABDOMEN PELVIS W 2022-10-21 07:25:15 Davey [facility]

[facility] [place]



                CONTRAST                                        [facility]

 

                BLOOD CULTURE SCREEN 2022-10-21 06:01:00 [facility]

 

                LIPASE          2022-10-21 06:01:00 Davey [facility]

 

                COMP. METABOLIC PANEL 2022-10-21 06:01:00 Yoni Mariscal [facility]



                ([id number])                                         [facility] [place]

 

                CBC WITH DIFF   2022-10-21 06:01:00 Davey [facility]

 

                POCT GLUCOSE (AUTOMATED) 2022 18:34:00 Pedro Sharpe

[facility]

 

                POCT GLUCOSE (AUTOMATED) 2022 14:37:00 Pedro Sharpe

[facility]

 

                BASIC METABOLIC PANEL 2022 13:22:00 Topete [facility]



                (NA, K, CL, CO2, GLUCOSE,                                 Medica

l Branch



                BUN, CREATININE, CA)                                 

 

                CBC WITH DIFF   2022 13:22:00 Finn [facility]

 

                POCT GLUCOSE (AUTOMATED) 2022 10:56:00 Pedro Sharpe

[facility]

 

                POCT GLUCOSE (AUTOMATED) 2022 05:46:00 Pedro Sharpe

[facility]

 

                POCT GLUCOSE (AUTOMATED) 2022 02:17:00 Pedro Sharpe [facility]

 

                POCT GLUCOSE (AUTOMATED) 2022 23:13:00 Pedro Sharpe

[facility]

 

                POCT GLUCOSE (AUTOMATED) 2022 18:22:00 Pedro Sharpe [facility]

 

                POCT GLUCOSE (AUTOMATED) 2022 14:56:00 Pedro Sharpe

[facility]

 

                BASIC METABOLIC PANEL 2022 10:03:00 Finn [facility]

[facility] [place]



                (NA, K, CL, CO2, GLUCOSE,                                 Medica

l Branch



                BUN, CREATININE, CA)                                 

 

                CBC WITH DIFF   2022 10:03:00 Finn [facility]

 

                POCT GLUCOSE (AUTOMATED) 2022 02:18:00 Pedro Sharpe

[facility]

 

                POCT GLUCOSE (AUTOMATED) 2022-08-10 22:26:00 Pedro Sharpe

[facility]

 

                POCT GLUCOSE (AUTOMATED) 2022-08-10 18:57:00 Pedro Sharpe [facility]

 

                POCT GLUCOSE (AUTOMATED) 2022-08-10 18:57:00 Park[facility]ro [facility]

[facility]

 

                US DUPLEX VENOUS ARMS 2022-08-10 17:28:45 Finn Matt [facility]

[facility] [place]



                BILATERAL - BY VASCULAR                                 [facility]



                LAB                                             

 

                US DUPLEX VENOUS ARMS 2022-08-10 17:28:45 Finn Matt [facility]

[facility] [place]



                BILATERAL - BY VASCULAR                                 [facility]



                LAB                                             

 

                POCT GLUCOSE (AUTOMATED) 2022-08-10 15:20:00 Pedro Sharpe

[facility]

 

                POCT GLUCOSE (AUTOMATED) 2022-08-10 15:20:00 Pedro Sharpe

[facility]

 

                MAGNESIUM       2022-08-10 10:19:00 [facility]

 

                BASIC METABOLIC PANEL 2022-08-10 10:19:00 Stas Edwardo Unive

[facility] [place]



                (NA, K, CL, CO2, GLUCOSE,                 Robin         Medica

l Branch



                BUN, CREATININE, CA)                                 

 

                CBC WITH DIFF   2022-08-10 10:19:00 [facility]

 

                MAGNESIUM       2022-08-10 10:19:00 [facility]

 

                BASIC METABOLIC PANEL 2022-08-10 10:19:00 Stas Edwardo Unive

[facility] [place]



                (NA, K, CL, CO2, GLUCOSE,                 Robin         Medica

l Branch



                BUN, CREATININE, CA)                                 

 

                CBC WITH DIFF   2022-08-10 10:19:00 [facility]

 

                POCT GLUCOSE (AUTOMATED) 2022-08-10 02:44:00 Pedro Sharpe

[facility]

 

                POCT GLUCOSE (AUTOMATED) 2022-08-10 02:44:00 Pedro Sharpe

[facility]

 

                POCT GLUCOSE (AUTOMATED) 2022 22:02:00 Pedro Sharpe

[facility]

 

                POCT GLUCOSE (AUTOMATED) 2022 22:02:00 Pedro Sharpe

[facility]

 

                FUNGUS (ROUTINE) CULTURE 2022 17:03:00 Merry Bonds [facility]



                                                A               Medical Branch

 

                TISSUE          2022 17:03:00 Sapphire Bonds [facility]



                CULTURE(AEROBIC/ANAEROBIC                 A               Medica

l Branch



                )                                               

 

                FUNGUS (ROUTINE) CULTURE 2022 17:03:00 Merry Bonds [facility]

 

                TISSUE          2022 17:03:00 Sapphire Bonds [facility]



                CULTURE(AEROBIC/ANAEROBIC                 A               Medica

l Branch



                )                                               

 

                FUNGUS (ROUTINE) CULTURE 2022 17:00:00 Merry Bonds [facility]

 

                TISSUE          2022 17:00:00 Sapphire Bonds [facility]



                CULTURE(AEROBIC/ANAEROBIC                 A               Medica

l Branch



                )                                               

 

                FUNGUS (ROUTINE) CULTURE 2022 17:00:00 Merry Bonds [facility]

 

                TISSUE          2022 17:00:00 Sapphire Bonds [facility]



                CULTURE(AEROBIC/ANAEROBIC                 A               Medica

l Branch



                )                                               

 

                FUNGUS (ROUTINE) CULTURE 2022 16:59:00 Merry Bonds [facility]

 

                TISSUE          2022 16:59:00 Sapphire Bonds [facility]



                CULTURE(AEROBIC/ANAEROBIC                 A               Medica

l Branch



                )                                               

 

                FUNGUS (ROUTINE) CULTURE 2022 16:59:00 Merry Bonds [facility]

 

                TISSUE          2022 16:59:00 Sapphire Bonds [facility]



                CULTURE(AEROBIC/ANAEROBIC                 A               Medica

l Branch



                )                                               

 

                FOOT DEBRIDEMENT 2022 16:11:00 Sapphire Bonds [facility]

 

                FOOT DEBRIDEMENT 2022 16:11:00 Sapphire Bonds [facility]

 

                COVID-19 (ID NOW RAPID 2022 14:36:00 Matt Briseno Uni

[facility] [place]



                TESTING)                                        Medical Branch

 

                LAB ONLY COVID  2022 14:36:00 Matt Briseno [facility]



                INTERPRETATION                                  [facility]

 

                COVID-19 (ID NOW RAPID 2022 14:36:00 Matt Briseno Uni

[facility] [place]



                TESTING)                                        Medical Branch

 

                LAB ONLY COVID  2022 14:36:00 Matt Briseno [facility]

 

                POCT GLUCOSE (AUTOMATED) 2022 14:07:00 León Chambers  Uni

[facility]

 

                POCT GLUCOSE (AUTOMATED) 2022 14:07:00 León Chambers  Uni

[facility]

 

                HB ECG ROUTINE & RHYTHM 2022 13:27:17 Jessi Topete [facility]

 

                CBC WITH DIFF   2022 10:55:00 [facility]

 

                CBC WITH DIFF   2022 10:55:00 [facility]

 

                MAGNESIUM       2022 10:40:00 [facility]

 

                BASIC METABOLIC PANEL 2022 10:40:00 Edwardo Mcclelland Un[facility]

[facility] [place]



                (NA, K, CL, CO2, GLUCOSE,                 Robin         Medica

l Branch



                BUN, CREATININE, CA)                                 

 

                COVID-19 (ID NOW RAPID 2022 10:40:00 Valeriy, Ankita    Unive

[facility] [place]



                TESTING)                                        Medical Branch

 

                LAB ONLY COVID  2022 [facility]:40[facility]



                INTERPRETATION                                  Medical Branch

 

                MAGNESIUM       2022 10:40:00 [facility]

 

                BASIC METABOLIC PANEL 2022 10:40:00 Edwardo Mcclelland Un[facility]

[facility] [place]



                (NA, K, CL, CO2, GLUCOSE,                 Robin         Medica

l Branch



                BUN, CREATININE, CA)                                 

 

                COVID-19 (ID NOW RAPID 2022 10:40:00 [facility]



                TESTING)                                        Medical Branch

 

                LAB ONLY COVID  2022 10:40:00 Valeriy [facility]

 

                POCT GLUCOSE (AUTOMATED) 2022 02:29:00 León Chambers  Uni

[facility]

 

                POCT GLUCOSE (AUTOMATED) 2022 02:29:00 León Chambers  Uni

[facility]

 

                POCT GLUCOSE (AUTOMATED) 2022 23:39:00 León Chambers  Uni

[facility] of [facility]

 

                POCT GLUCOSE (AUTOMATED) 2022 23:39:00 León Chambers  Uni

versity of [facility]

 

                POCT GLUCOSE (AUTOMATED) 2022 17:10:00 León Chambers  Uni

versity of [facility]

 

                POCT GLUCOSE (AUTOMATED) 2022 17:10:00 León Chambers  Uni

versity of [facility]

 

                POCT GLUCOSE (AUTOMATED) 2022 17:10:00 León Chambers  Uni

versity of [facility]

 

                POCT GLUCOSE (AUTOMATED) 2022 15:54:00 León Chambers  Uni

versity of [facility]

 

                POCT GLUCOSE (AUTOMATED) 2022 15:54:00 León Chambers  Uni

versity of [facility]

 

                POCT GLUCOSE (AUTOMATED) 2022 15:54:00 León Chambers  Uni

[facility]

 

                SEDIMENTATION RATE 2022 08:36:00 Isabell [facility]

 

                BASIC METABOLIC PANEL 2022 08:36:00 [facility]



                (NA, K, CL, CO2, GLUCOSE,                                 Medica

l Branch



                BUN, CREATININE, CA)                                 

 

                CBC WITH DIFF   2022 08:36:00 Zaki [facility]

 

                MAGNESIUM       2022 08:36:00 Zaki [facility]

 

                MAGNESIUM       2022 08:36:00 Zaki [facility]

 

                BASIC METABOLIC PANEL 2022 08:36:00 Zaki [facility]



                (NA, K, CL, CO2, GLUCOSE,                                 Medica

l Branch



                BUN, CREATININE, CA)                                 

 

                SEDIMENTATION RATE 2022 08:36:00 Isabell [facility]

 

                CBC WITH DIFF   2022 08:36:00 Zaki [facility]

 

                MAGNESIUM       2022 08:36:00 Zaki [facility]

 

                BASIC METABOLIC PANEL 2022 08:36:00 Zaki [facility]



                (NA, K, CL, CO2, GLUCOSE,                                 Medica

l Branch



                BUN, CREATININE, CA)                                 

 

                SEDIMENTATION RATE 2022 08:36:00 Isabell Karina     [facility]

 

                CBC WITH DIFF   2022 08:36:00 Zaki [facility]

 

                POCT GLUCOSE (AUTOMATED) 2022 00:57:00 Yo Law B Un

iversity of 

[facility]

 

                POCT GLUCOSE (AUTOMATED) 2022 00:57:00 Yo Law B Un

iversity of 

[facility]

 

                POCT GLUCOSE (AUTOMATED) 2022 00:57:00 Yo Law B Un

iversity of 

[facility]

 

                POCT GLUCOSE (AUTOMATED) 2022 21:26:00 Yo Law B Un

iversity of 

[facility]

 

                POCT GLUCOSE (AUTOMATED) 2022 21:26:00 Abdirahman Lawy B Un

iversity of 

[facility]

 

                POCT GLUCOSE (AUTOMATED) 2022 21:26:00 Abdirahman Lawy B Un

iversity of 

[facility]

 

                POCT GLUCOSE (AUTOMATED) 2022 16:47:00 Abdirahman Lawy B Un

iversity of 

[facility]

 

                POCT GLUCOSE (AUTOMATED) 2022 16:47:00 Abdirahman Lawy B Un

iversity of 

[facility]

 

                POCT GLUCOSE (AUTOMATED) 2022 16:47:00 Thanh Yo B Un

iversity of 

[facility]

 

                BASIC METABOLIC PANEL 2022 14:21:00 Carolann Haines [facility]



                (NA, K, CL, CO2, GLUCOSE,                                 Medica

l Branch



                BUN, CREATININE, CA)                                 

 

                C-REACTIVE PROTEIN 2022 14:21:00 Isabell [facility]

 

                C-REACTIVE PROTEIN 2022 14:21:00 [facility]

 

                BASIC METABOLIC PANEL 2022 14:21:00 Carolann Haines [facility]



                (NA, K, CL, CO2, GLUCOSE,                                 Medica

l Branch



                BUN, CREATININE, CA)                                 

 

                C-REACTIVE PROTEIN 2022 14:21:00 [facility]

 

                BASIC METABOLIC PANEL 2022 14:21:00 Carolann Haines [facility]



                (NA, K, CL, CO2, GLUCOSE,                                 Medica

l Branch



                BUN, CREATININE, CA)                                 

 

                POCT GLUCOSE (AUTOMATED) 2022 12:34:00 Yo Law Un

iversity of 

[facility]

 

                POCT GLUCOSE (AUTOMATED) 2022 12:34:00 Yo Law Un

iversity of 

[facility]

 

                POCT GLUCOSE (AUTOMATED) 2022 12:34:00 Yo Law B Un

iversity of 

[facility]

 

                POCT GLUCOSE (AUTOMATED) 2022 01:28:00 Yo Law Un

[facility] 

[place]



                                                                Medical Branch

 

                POCT GLUCOSE (AUTOMATED) 2022 01:28:00 Yo Law Un

iversity of 

[facility]

 

                POCT GLUCOSE (AUTOMATED) 2022 01:28:00 Yo Law B Un

[facility] of 

[place]



                                                                Medical Branch

 

                POCT GLUCOSE (AUTOMATED) 2022 21:13:00 Yo Law B Un

iversity of 

[facility]

 

                POCT GLUCOSE (AUTOMATED) 2022 21:13:00 Yo Law B Un

[facility] of 

[place]



                                                                Medical Branch

 

                POCT GLUCOSE (AUTOMATED) 2022 21:13:00 Yo Law B Un

iversity of 

[facility]

 

                POCT GLUCOSE (AUTOMATED) 2022 16:39:00 Yo Law B Un

[facility] 

[place]



                                                                Medical Branch

 

                POCT GLUCOSE (AUTOMATED) 2022 16:39:00 Yo Law B Un

iversity of 

[facility]

 

                POCT GLUCOSE (AUTOMATED) 2022 16:39:00 Yo Law B Un

iversity of 

[facility]

 

                POCT GLUCOSE (AUTOMATED) 2022 13:11:00 Yo Law Un

iversity of 

[facility]

 

                POCT GLUCOSE (AUTOMATED) 2022 13:11:00 Yo Law Un

iversity of 

[facility]

 

                POCT GLUCOSE (AUTOMATED) 2022 13:11:00 Yo Law Un

iversity of 

[facility]

 

                MAGNESIUM       2022 10:28:00 Shaq [facility]

 

                BASIC METABOLIC PANEL 2022 10:28:00 Butt, [facility]

[facility]



                (NA, K, CL, CO2, GLUCOSE,                                 Medica

l Branch



                BUN, CREATININE, CA)                                 

 

                MAGNESIUM       2022 10:28:00 Butt, [facility]

 

                BASIC METABOLIC PANEL 2022 10:28:00 Butt, [facility]

[facility]



                (NA, K, CL, CO2, GLUCOSE,                                 Medica

l Branch



                BUN, CREATININE, CA)                                 

 

                MAGNESIUM       2022 10:28:00 Butt [facility]

 

                BASIC METABOLIC PANEL 2022 10:28:00 [facility]

[facility]



                (NA, K, CL, CO2, GLUCOSE,                                 Medica

l Branch



                BUN, CREATININE, CA)                                 

 

                POCT GLUCOSE (AUTOMATED) 2022 10:01:00 Yo Law Un

iversity of 

[facility]

 

                POCT GLUCOSE (AUTOMATED) 2022 10:01:00 Yo Law Un

iversity of 

[facility]

 

                POCT GLUCOSE (AUTOMATED) 2022 10:01:00 Yo Law Un

iversity of 

[facility]

 

                POCT GLUCOSE (AUTOMATED) 2022 06:32:00 Yo Law Un

iversity of 

[facility]

 

                POCT GLUCOSE (AUTOMATED) 2022 06:32:00 Yo Law Un

iversity of 

[facility]

 

                POCT GLUCOSE (AUTOMATED) 2022 06:32:00 Yo Law Un

[facility]

 

                BASIC METABOLIC PANEL 2022 02:01:00 Chana [facility]



                (NA, K, CL, CO2, GLUCOSE,                                 Medica

l Branch



                BUN, CREATININE, CA)                                 

 

                BASIC METABOLIC PANEL 2022 02:01:[address] [facility]



                (NA, K, CL, CO2, GLUCOSE,                                 Medica

l Branch



                BUN, CREATININE, CA)                                 

 

                BASIC METABOLIC PANEL 2022 02:01:00 Ch[address] [facility]



                (NA, K, CL, CO2, GLUCOSE,                                 Medica

l Branch



                BUN, CREATININE, CA)                                 

 

                POCT GLUCOSE (AUTOMATED) 2022 01:53:00 Yo Law Un

iversity of 

[facility]

 

                POCT GLUCOSE (AUTOMATED) 2022 01:53:00 Yo Law Un

iversity of 

[facility]

 

                POCT GLUCOSE (AUTOMATED) 2022 01:53:00 Yo Law Un

iversity of 

[facility]

 

                POCT GLUCOSE (AUTOMATED) 2022 23:02:00 Yo Law Un

iversity of 

[facility]

 

                POCT GLUCOSE (AUTOMATED) 2022 23:02:00 Yo Law Un

[facility] 

[place]



                                                                Medical Branch

 

                POCT GLUCOSE (AUTOMATED) 2022 23:02:00 Yo Law Un

[facility] of 

[place]



                                                                Medical Branch

 

                XR FOOT 3+ VW BILATERAL 2022 21:35:00 [facility]



                                                A               Medical Branch

 

                XR FOOT 3+ VW BILATERAL 2022 21:35:00 [facility]



                                                A               Medical Branch

 

                XR FOOT 3+ VW BILATERAL 2022 21:35:00 [facility]

 

                POCT GLUCOSE (AUTOMATED) 2022 20:45:00 Yo Law Un

iversity of 

[facility]

 

                POCT GLUCOSE (AUTOMATED) 2022 20:45:00 Yo Law Un

iversity of 

[facility] Branch

 

                POCT GLUCOSE (AUTOMATED) 2022 20:45:00 Yo Law Un

iversity of 

[facility]

 

                POCT GLUCOSE (AUTOMATED) 2022 16:41:00 Yo Law Un

iversity of 

[facility]

 

                POCT GLUCOSE (AUTOMATED) 2022 16:41:00 Yo Law Un

iversity of 

[facility]

 

                POCT GLUCOSE (AUTOMATED) 2022 16:41:00 Yo Law Un

iversity of 

[facility]

 

                BASIC METABOLIC PANEL 2022 15:35:00 Yo Law Unive

[facility]



                (NA, K, CL, CO2, GLUCOSE,                                 Medica

l Branch



                BUN, CREATININE, CA)                                 

 

                BASIC METABOLIC PANEL 2022 15:35:00 Yo Law Unive

[facility]



                (NA, K, CL, CO2, GLUCOSE,                                 Medica

l Branch



                BUN, CREATININE, CA)                                 

 

                BASIC METABOLIC PANEL 2022 15:35:00 Yo Law [facility]

[facility]



                (NA, K, CL, CO2, GLUCOSE,                                 Medica

l Branch



                BUN, CREATININE, CA)                                 

 

                BETA HYDROXY-BUTYRATE 2022 15:34:00 Donald Collazo     [facility]

 

                BETA HYDROXY-BUTYRATE 2022 15:34:00 Donald Collazo     [facility]

 

                BETA HYDROXY-BUTYRATE 2022 15:34:00 Donald Collazo     [facility]

 

                POCT GLUCOSE (AUTOMATED) 2022 14:20:00 Yo Law Un

iversity of 

[facility]

 

                POCT GLUCOSE (AUTOMATED) 2022 14:20:00 Yo Law Un

iversity of 

[facility]

 

                POCT GLUCOSE (AUTOMATED) 2022 14:20:00 Yo Law Un

iversity of 

[facility]

 

                POCT GLUCOSE (AUTOMATED) 2022 12:52:00 Yo Law Un

iversity of 

[facility]

 

                POCT GLUCOSE (AUTOMATED) 2022 12:52:00 Yo Law Un

iversity [facility]

 

                POCT GLUCOSE (AUTOMATED) 2022 12:52:00 Yo Law Un

iversity of 

[facility]

 

                POCT GLUCOSE (AUTOMATED) 2022 09:04:00 Yo Law Un

iversity of 

[facility]

 

                POCT GLUCOSE (AUTOMATED) 2022 09:04:00 Yo Law Un

iversity [facility]

 

                POCT GLUCOSE (AUTOMATED) 2022 09:04:00 Yo Law Un

iversity [facility]

 

                BASIC METABOLIC PANEL 2022 06:13:00 [facility]



                (NA, K, CL, CO2, GLUCOSE,                                 Medica

l Branch



                BUN, CREATININE, CA)                                 

 

                GLUTAMIC ACID   2022 06:13:00 Skylar Tinajero  [facility]



                DECARBOXYLASE [facility]

 

                CBC WITHOUT DIFF 2022 06:13:00 [facility]

 

                MAGNESIUM       2022 06:13:00 [facility]

 

                MAGNESIUM       2022 06:13:00 Tanvi [facility]

 

                BASIC METABOLIC PANEL 2022 06:13:00 Skylar Tinajero  [facility]



                (NA, K, CL, CO2, GLUCOSE,                                 Medica

l Branch



                BUN, CREATININE, CA)                                 

 

                CBC WITHOUT DIFF 2022 06:13:00 [facility]

 

                GLUTAMIC ACID   2022 06:13:00 [facility]



                DECARBOXYLASE AB                                 [facility]

 

                MAGNESIUM       2022 06:13:00 Tanvi [facility]

 

                BASIC METABOLIC PANEL 2022 06:13:00 Skylar Tinajero  [facility]



                (NA, K, CL, CO2, GLUCOSE,                                 Medica

l Branch



                BUN, CREATININE, CA)                                 

 

                CBC WITHOUT DIFF 2022 06:13:00 Tanvi [facility]

 

                GLUTAMIC ACID   2022 06:13:00 Tanvi, Skylar  [facility]

[facility]

 

                POCT GLUCOSE (AUTOMATED) 2022 05:45:00 Yo Law Un

iversity of 

[facility]

 

                POCT GLUCOSE (AUTOMATED) 2022 05:45:00 Yo Law Un

iversity of 

[facility]

 

                POCT GLUCOSE (AUTOMATED) 2022 05:45:00 Yo Law Un

iversity of 

[facility]

 

                POCT GLUCOSE (AUTOMATED) 2022 01:26:00 Yo Law Un

iversity of 

[facility]

 

                POCT GLUCOSE (AUTOMATED) 2022 01:26:00 Yo Law Un

iversity of 

[facility]

 

                POCT GLUCOSE (AUTOMATED) 2022 01:26:00 Yo Law Un

iversity of 

[facility]

 

                POCT GLUCOSE (AUTOMATED) 2022 23:42:00 Yo Law Un

iversity of 

[facility]

 

                POCT GLUCOSE (AUTOMATED) 2022 23:42:00 Yo Law Un

iversity of 

[facility]

 

                POCT GLUCOSE (AUTOMATED) 2022 23:42:00 Yo Law Un

iversity of 

[facility]

 

                BASIC METABOLIC PANEL 2022 22:51:00 Yo Law Unive

[facility] [place]



                (NA, K, CL, CO2, GLUCOSE,                                 Medica

l Branch



                BUN, CREATININE, CA)                                 

 

                BASIC METABOLIC PANEL 2022 22:51:00 Yo Law Unive

[facility] [place]



                (NA, K, CL, CO2, GLUCOSE,                                 Medica

l Branch



                BUN, CREATININE, CA)                                 

 

                BASIC METABOLIC PANEL 2022 22:51:00 Yo Law Unive

[facility] [place]



                (NA, K, CL, CO2, GLUCOSE,                                 Medica

l Branch



                BUN, CREATININE, CA)                                 

 

                POCT GLUCOSE (AUTOMATED) 2022 22:37:00 Yo Law Un

iversity of 

[facility]

 

                POCT GLUCOSE (AUTOMATED) 2022 22:37:00 Yo Law Un

iversity of 

[facility]

 

                POCT GLUCOSE (AUTOMATED) 2022 22:37:00 Yo Law Un

iversity of 

[facility]

 

                POCT GLUCOSE (AUTOMATED) 2022 21:30:00 Yo Law Un

iversity of 

[facility]

 

                POCT GLUCOSE (AUTOMATED) 2022 21:30:00 Yo Law Un

iversity of 

[facility]

 

                POCT GLUCOSE (AUTOMATED) 2022 21:30:00 Yo Law Un

iversity of 

[facility]

 

                POCT GLUCOSE (AUTOMATED) 2022 20:05:00 Yo Law Un

iversity of 

[facility]

 

                POCT GLUCOSE (AUTOMATED) 2022 20:05:00 Yo Law Un

iversity of 

[facility]

 

                POCT GLUCOSE (AUTOMATED) 2022 20:05:00 Yo Law Un

iversity of 

[facility]

 

                HB ECG ROUTINE & RHYTHM 2022 19:59:13 Chana [place] [facility]

versity of [facility]

 

                HB ECG ROUTINE & RHYTHM 2022 19:59:13 Shaq Ahrubén Chon Uni

[facility]ity [facility]

 

                HB ECG ROUTINE & RHYTHM 2022 19:59:13 Chana [place] [facility]

[facility]

 

                POCT GLUCOSE (AUTOMATED) 2022 19:06:00 Yo Law Un

iversity of 

[facility]

 

                POCT GLUCOSE (AUTOMATED) 2022 19:06:00 Yo Law Un

iversity of 

[facility]

 

                POCT GLUCOSE (AUTOMATED) 2022 19:06:00 Yo Law Un

iversity of 

[facility]

 

                BLOOD CULTURE SCREEN 2022 18:49:00 Carolann Haines [facility]

ity [facility]

 

                BLOOD CULTURE SCREEN 2022 18:49:00 Carolann Haines [facility]

ity [facility]

 

                BLOOD CULTURE SCREEN 2022 18:49:00 Carolann Haines [facility]

ity of [facility]

 

                BLOOD CULTURE SCREEN 2022 18:48:00 Carolann Haines [facility]

 

                BLOOD CULTURE SCREEN 2022 18:48:00 Carolann Haines [facility]

 

                BLOOD CULTURE SCREEN 2022 18:48:00 Carolann Haines [facility]

 

                XR CHEST 1 VW   2022 18:34:00 Butt, [facility]

 

                XR CHEST 1 VW   2022 18:34:00 Butt, [facility]

 

                XR CHEST 1 VW   2022 18:34:00 [facility]

 

                BASIC METABOLIC PANEL 2022 17:49:00 Yo Law [facility]

[facility]



                (NA, K, CL, CO2, GLUCOSE,                                 Medica

l Branch



                BUN, CREATININE, CA)                                 

 

                TROPONIN I      2022 17:49:00 [facility]

 

                TROPONIN I      2022 17:49:00 [facility]

 

                BASIC METABOLIC PANEL 2022 17:49:00 Yo Law [facility]

[facility] [place]



                (NA, K, CL, CO2, GLUCOSE,                                 Medica

l Branch



                BUN, CREATININE, CA)                                 

 

                TROPONIN I      2022 17:49:00 [facility]

 

                BASIC METABOLIC PANEL 2022 17:49:00 Yo Law Unive

[facility]



                (NA, K, CL, CO2, GLUCOSE,                                 Medica

l Branch



                BUN, CREATININE, CA)                                 

 

                POCT GLUCOSE (AUTOMATED) 2022 17:27:00 Abdirahman Lawy B Un

iversity [facility]

 

                POCT GLUCOSE (AUTOMATED) 2022 17:27:00 Abdirahman Lawy B Un

iversity of 

[facility]

 

                POCT GLUCOSE (AUTOMATED) 2022 17:27:00 Abdirahman Lawy B Un

iversity [facility]

 

                POCT GLUCOSE (AUTOMATED) 2022 15:53:00 Yo Law B Un

iversity [facility]

 

                POCT GLUCOSE (AUTOMATED) 2022 15:53:00 Yo Law Un

iversity of 

[place]



                                                                Medical Branch

 

                POCT GLUCOSE (AUTOMATED) 2022 15:53:00 Yo Law Un

[facility] of 

[place]



                                                                Medical Branch

 

                BASIC METABOLIC PANEL 2022 15:32:00 Yo Law Unive

[facility] [place]



                (NA, K, CL, CO2, GLUCOSE,                                 Medica

l Branch



                BUN, CREATININE, CA)                                 

 

                BASIC METABOLIC PANEL 2022 15:32:00 Yo Law Unive

[facility] [place]



                (NA, K, CL, CO2, GLUCOSE,                                 Medica

l Branch



                BUN, CREATININE, CA)                                 

 

                BASIC METABOLIC PANEL 2022 15:32:00 Yo Law Unive

[facility] [place]



                (NA, K, CL, CO2, GLUCOSE,                                 Medica

l Branch



                BUN, CREATININE, CA)                                 

 

                POCT GLUCOSE (AUTOMATED) 2022 14:56:00 Yo Law Un

iversity of 

[place]



                                                                Medical Branch

 

                POCT GLUCOSE (AUTOMATED) 2022 14:56:00 Yo Law Un

iversity of 

[place]



                                                                Medical Branch

 

                POCT GLUCOSE (AUTOMATED) 2022 14:56:00 Yo Law Un

iversity of 

[place]



                                                                Medical Branch

 

                POCT GLUCOSE (AUTOMATED) 2022 13:48:00 Yo Law Un

iversity of 

[place]



                                                                Medical Branch

 

                POCT GLUCOSE (AUTOMATED) 2022 13:48:00 Yo Law Un

iversity of 

[place]



                                                                Medical Branch

 

                POCT GLUCOSE (AUTOMATED) 2022 13:48:00 Yo Law Un

iversity of 

[place]



                                                                Medical Branch

 

                POCT GLUCOSE (AUTOMATED) 2022 10:42:00 Yo Law Un

iversity of 

[place]



                                                                Medical Branch

 

                POCT GLUCOSE (AUTOMATED) 2022 10:42:00 Yo Law Un

iversity of 

[place]



                                                                Medical Branch

 

                POCT GLUCOSE (AUTOMATED) 2022 10:42:00 Yo Law Un

iversity of 

[place]



                                                                Medical Branch

 

                BASIC METABOLIC PANEL 2022 10:14:00 Zaki [facility]



                (NA, K, CL, CO2, GLUCOSE,                                 Medica

l Branch



                BUN, CREATININE, CA)                                 

 

                MAGNESIUM       2022 10:14:00 Zaki [facility]

 

                MAGNESIUM       2022 10:14:00 [facility]

 

                BASIC METABOLIC PANEL 2022 10:14:00 Haines, [facility]



                (NA, K, CL, CO2, GLUCOSE,                                 Medica

l Branch



                BUN, CREATININE, CA)                                 

 

                MAGNESIUM       2022 [facility]14:00 [facility]

 

                BASIC METABOLIC PANEL 2022 10:14:00 Haines, [facility]



                (NA, K, CL, CO2, GLUCOSE,                                 Medica

l Branch



                BUN, CREATININE, CA)                                 

 

                POCT GLUCOSE (AUTOMATED) 2022 09:30:00 Yo Law Un

[facility]

 

                POCT GLUCOSE (AUTOMATED) 2022 09:30:00 Yo Law Un

[facility]

 

                POCT GLUCOSE (AUTOMATED) 2022 09:30:00 Yo Law Un

[facility]

 

                POCT GLUCOSE (AUTOMATED) 2022 07:11:00 Yo Law Un

[facility]

 

                POCT GLUCOSE (AUTOMATED) 2022 07:11:00 Yo Law Un

[facility]

 

                POCT GLUCOSE (AUTOMATED) 2022 07:11:00 Yo Law Un

[facility]

 

                OSMOLALITY, SERUM OR 2022 07:07:00 Yo Law [facility]

 

                BETA HYDROXY-BUTYRATE 2022 07:07:00 Yo Lawe

[facility]

 

                OSMOLALITY, SERUM OR 2022 07:07:00 Yo Law [facility]

 

                BETA HYDROXY-BUTYRATE 2022 07:07:00 Yo Law Unive

[facility]

 

                OSMOLALITY, SERUM OR 2022 07:07:00 Yo Law Univer

sity [facility]



                PLASMA                                          [facility]

 

                BETA HYDROXY-BUTYRATE 2022 07:07:00 Yo Law Unive

[facility]

 

                BASIC METABOLIC PANEL 2022 07:06:00 Yo Law Unive

[facility]



                (NA, K, CL, CO2, GLUCOSE,                                 Medica

l Branch



                BUN, CREATININE, CA)                                 

 

                BASIC METABOLIC PANEL 2022 07:06:00 Yo Law B Unive

[facility]



                (NA, K, CL, CO2, GLUCOSE,                                 Medica

l Branch



                BUN, CREATININE, CA)                                 

 

                BASIC METABOLIC PANEL 2022 07:06:00 Yo Law Unive

[facility]



                (NA, K, CL, CO2, GLUCOSE,                                 Medica

l Branch



                BUN, CREATININE, CA)                                 

 

                CT ABDOMEN PELVIS W 2022 05:28:46 [facility]



                CONTRAST                                        [facility]

 

                CT ABDOMEN PELVIS W 2022 05:28:46 Yo Law Univers

ity [facility]



                CONTRAST                                        [facility]

 

                CT ABDOMEN PELVIS W 2022 05:28:46 Yo Law Univers

[facility]



                CONTRAST                                        [facility]

 

                POCT GLUCOSE(AGE >30DAYS) 2022 05:11:00 Yo Law [facility]

 

                POCT GLUCOSE(AGE >30DAYS) 2022 05:11:00 Yo Law U

[facility]

 

                POCT GLUCOSE(AGE >30DAYS) 2022 05:11:00 Yo Law U

[facility]

 

                POCT GLUCOSE (AUTOMATED) 2022 05:08:00 Yo Law Un

[facility]

 

                POCT GLUCOSE (AUTOMATED) 2022 05:08:00 Yo Law Un

[facility]

 

                POCT GLUCOSE (AUTOMATED) 2022 05:08:00 Yo Law

[facility]

 

                BLOOD CULTURE SCREEN 2022 04:31:00 [facility]

[facility]

 

                BLOOD CULTURE WORKUP 2022 04:31:00 Yo Law [facility]

 

                GRAM POSITIVE BLOOD 2022 04:31:00 Yo Law [facility]



                PATHOGENS DNA                                   [facility]



                PROBE-AEROBIC                                   

 

                BLOOD CULTURE SCREEN 2022 04:31:00 Yo Law [facility]

 

                BLOOD CULTURE WORKUP 2022 04:31:00 Yo Law [facility]

 

                GRAM POSITIVE BLOOD 2022 04:31:00 Yo Law [facility]



                PATHOGENS [facility]



                PROBE-AEROBIC                                   

 

                BLOOD CULTURE SCREEN 2022 04:31:00 Yo Law [facility]

 

                BLOOD CULTURE WORKUP 2022 04:31:00 Yo Law [facility]

 

                GRAM POSITIVE BLOOD 2022 04:31:00 Yo Law [facility]



                [facility]



                PROBE-AEROBIC                                   

 

                URINALYSIS      2022 04:21:00 Yo Law [facility]

 

                URINE CULTURE   2022 04:21:00 Yo Law [facility]

 

                URINALYSIS      2022 04:21:00 Yo Law [facility]

 

                URINE CULTURE   2022 04:21:00 Yo Law [facility]

 

                URINALYSIS      2022 04:21:00 Yo Law [facility]

 

                URINE CULTURE   2022 04:21:00 Yo Law [facility]

 

                COVID-19 (ID NOW RAPID 2022 04:20:00 [facility] Law [facility]

[facility] [place]



                TESTING)                                        Medical Branch

 

                LAB ONLY COVID  2022 04:20:00 Yo Law [facility]

 

                COVID-19 (ID NOW RAPID 2022 04:20:00 Thanh Yo B Univ

[facility] [place]



                TESTING)                                        Medical Branch

 

                LAB ONLY COVID  2022 04:20:00 Yo Law [facility]

 

                COVID-19 (ID NOW RAPID 2022 04:20:00 Thanh [facility] B [facility]

[facility] [place]



                TESTING)                                        Medical Branch

 

                LAB ONLY COVID  2022 04:20:00 Yo Law [facility]

 

                CBC WITH DIFF   2022 04:18:00 Yo Law [facility]

 

                PROTHROMBIN TIME / INR 2022 04:18:00 Thanh [facility]

 

                BASIC METABOLIC PANEL 2022 04:18:00 Yo Law [facility]



                (NA, K, CL, CO2, GLUCOSE,                                 Medica

l Branch



                BUN, CREATININE, CA)                                 

 

                HEPATIC FUNCTION PANEL 2022 04:18:00 Yo Law Univ

[facility] [place]



                ([id number]) (ALB,T.PRO,BILI                                 [facility] 

Branch



                T,BU/BC,ALT,AST,ALK PHOS)                                 

 

                LIPASE          2022 04:18:00 Yo Law [facility]

 

                ACUTE CARE VENOUS BLOOD 2022 04:18:00 Yo Law Uni

[facility] [place]



                GAS                                             [facility]

 

                LACTIC ACID WHOLE BLOOD 2022 04:18:00 Yo Law

[facility]

 

                MAGNESIUM       2022 04:18:00 Yo Law [facility]

 

                PHOSPHORUS      2022 04:18:00 Yo Law [facility]

 

                GLYCOSYLATED HEMOGLOBIN 2022 04:18:00 Yo Law Uni

[facility] [place]



                (A1C)                                           [facility]

 

                PHOSPHORUS      2022 04:18:00 Yo Law [facility]

 

                LIPASE          2022 04:18:00 Yo Law [facility]

 

                MAGNESIUM       2022 04:18:00 Yo Law [facility]

 

                HEPATIC FUNCTION PANEL 2022 04:18:00 Yo Law Univ

[facility] [place]



                ([id number]) (ALB,T.PRO,BILI                                 Medical 

Branch



                T,BU/BC,ALT,AST,ALK PHOS)                                 

 

                BASIC METABOLIC PANEL 2022 04:18:00 Yo [address] [facility]



                (NA, K, CL, CO2, GLUCOSE,                                 Medica

l Branch



                BUN, CREATININE, CA)                                 

 

                ACUTE CARE VENOUS BLOOD 2022 04:18:00 Yo Law [facility]

 

                CBC WITH DIFF   2022 04:18:00 Yo Law [facility]

 

                GLYCOSYLATED HEMOGLOBIN 2022 04:18:00 Yo Law Uni

[facility] [place]



                (A1C)                                           [facility]

 

                PROTHROMBIN TIME / INR 2022 04:18:00 Yo Law [facility]

 

                LACTIC ACID WHOLE BLOOD 2022 04:18:00 Yo Law [facility]

 

                PHOSPHORUS      2022 04:18:00 Yo Law [facility]

 

                LIPASE          2022 04:18:00 Yo Law [facility]

 

                MAGNESIUM       2022 04:18:00 Yo Law [facility]

 

                HEPATIC FUNCTION PANEL 2022 04:18:00 [facility] Law Univ

[facility] [place]



                ([id number]) (ALB,T.PRO,[facility]



                T,BU/BC,ALT,AST,ALK PHOS)                                 

 

                BASIC METABOLIC PANEL 2022 04:18:00 Yo [address] [facility]



                (NA, K, CL, CO2, GLUCOSE,                                 Medica

l Branch



                BUN, CREATININE, CA)                                 

 

                ACUTE CARE VENOUS BLOOD 2022 04:18:00 Yo Law Uni

[facility]

 

                CBC WITH DIFF   2022 04:18:00 Yo Law [facility]

 

                GLYCOSYLATED HEMOGLOBIN 2022 04:18:00 Yo Law Uni

[facility] [place]



                ([id number])                                           [facility]

 

                PROTHROMBIN TIME / INR 2022 04:18:00 Yo Law Univ

ersity of [facility]

 

                LACTIC ACID WHOLE BLOOD 2022 04:18:00 Yo Law Uni

[facility]

 

                EMERGENCY DEPARTMENT 2022 05:01:00 Doctor Unassigned, No U

[facility] 

[place]



                DOCUMENTS                       [facility]

 

                HOSPITAL ADMISSION 2022 05:01:00 Doctor Unassigned, No Uni

[facility]ity of [facility]

 

                EMERGENCY DEPARTMENT 2022 05:01:00 Doctor Unassigned, No U

[facility] 

[place]



                DOCUMENTS                       [facility]

 

                HOSPITAL ADMISSION 2022 05:01:00 Doctor Unassigned, No Uni

versity of [facility]

 

                EMERGENCY DEPARTMENT 2022 05:01:00 Doctor Unassigned, No U

niversity of 

[facility]

 

                HOSPITAL ADMISSION 2022 05:01:00 Doctor Unassigned, No Uni

versity of [facility]

 

                POCT GLUCOSE (AUTOMATED) 2022 19:34:00 Rachel Bailey  Uni

versity of [facility]

 

                POCT GLUCOSE (AUTOMATED) 2022 19:34:00 Rachel Bailey  Uni

[facility]ity of [facility]

 

                POCT GLUCOSE (AUTOMATED) 2022 16:39:00 Rachel Bailey  Uni

[facility]ity of [facility]

 

                POCT GLUCOSE (AUTOMATED) 2022 16:39:00 Rachel Bailey  Uni

versity of [facility]

 

                BASIC METABOLIC PANEL 2022 09:13:00 Sunil Lu  Univer

[facility] [place]



                (NA, K, CL, CO2, GLUCOSE,                                 Medica

l Branch



                BUN, CREATININE, CA)                                 

 

                BASIC METABOLIC PANEL 2022 09:13:00 Sunil Lu  Univer

[facility] [place]



                (NA, K, CL, CO2, GLUCOSE,                                 Medica

l Branch



                BUN, CREATININE, CA)                                 

 

                POCT GLUCOSE (AUTOMATED) 2022 01:45:00 Rachel Bailey  Uni

versity [facility]

 

                POCT GLUCOSE (AUTOMATED) 2022 01:45:00 Marcelle Mercy  Uni

versity of [facility]

 

                POCT GLUCOSE (AUTOMATED) 2022 21:36:00 Marcelle Mercy  Uni

versity of [facility]

 

                POCT GLUCOSE (AUTOMATED) 2022 21:36:00 Marcelle Mercy  Uni

versity [facility]

 

                POCT GLUCOSE (AUTOMATED) 2022 16:45:00 Marcelle Mercy  Uni

versity [facility]

 

                POCT GLUCOSE (AUTOMATED) 2022 16:45:00 Rachel Bailey  Bella

versity [facility]

 

                PHOSPHORUS      2022 13:17:00 Tabitha [facility]

 

                MAGNESIUM       2022 13:17:00 Tabitha [facility]

 

                BASIC METABOLIC PANEL 2022 13:17:00 Tabitha Sunil  Univer

[facility] [place]



                (NA, K, CL, CO2, GLUCOSE,                                 Medica

l Branch



                BUN, CREATININE, CA)                                 

 

                BASIC METABOLIC PANEL 2022 13:17:00 Tabitha Sunil  Univer

[facility] [place]



                (NA, K, CL, CO2, GLUCOSE,                                 Medica

l Branch



                BUN, CREATININE, CA)                                 

 

                MAGNESIUM       2022 13:17:00 Tabitha [facility]

 

                PHOSPHORUS      2022 13:17:00 Tabitha [facility]

 

                POCT GLUCOSE (AUTOMATED) 2022 12:42:00 Marcelle Mercy  Uni

versity [facility]

 

                POCT GLUCOSE (AUTOMATED) 2022 12:42:00 Marcelle Mercy  Uni

versity [facility]

 

                POCT GLUCOSE (AUTOMATED) 2022 01:08:00 Marcelle Mercy  Uni

versity [facility]

 

                POCT GLUCOSE (AUTOMATED) 2022 01:08:00 Rachel Bailey  [facility]

 

                URINALYSIS      2022 22:52:00 Tabitha [facility]

 

                URINE CULTURE   2022 22:52:00 Tabitha [facility]

 

                URINALYSIS      2022 22:52:00 Tabitha [facility]

 

                URINE CULTURE   2022 22:52:00 Tabitha [facility]

 

                POCT GLUCOSE (AUTOMATED) 2022 21:40:00 Edsofia, Mercy  Uni

[facility]

 

                POCT GLUCOSE (AUTOMATED) 2022 21:40:00 Edionwe, Mercy  Uni

versity [facility]

 

                POCT GLUCOSE (AUTOMATED) 2022 16:02:00 Edionwe, Mercy  Uni

versity of [facility]

 

                POCT GLUCOSE (AUTOMATED) 2022 16:02:00 Edsofia, Mercy  Uni

[facility]

 

                POCT GLUCOSE (AUTOMATED) 2022 13:08:00 Edsofia, Mercy  Uni

[facility]

 

                POCT GLUCOSE (AUTOMATED) 2022 13:08:00 Edionshraddha, Mercy  Uni

[facility]

 

                POCT GLUCOSE (AUTOMATED) 2022 10:58:00 Edionshraddha, Mercy  Uni

[facility]

 

                POCT GLUCOSE (AUTOMATED) 2022 10:58:00 Marcelle, Mercy  [employer]

[facility]

 

                LACTIC ACID WHOLE BLOOD 2022 09:47:00 Refugio Moran [facility]

 

                LACTIC ACID WHOLE BLOOD 2022 09:47:00 Refugio Moran [facility]

 

                PHOSPHORUS      2022 09:46:00 Refugio Moran [facility]

 

                MAGNESIUM       2022 09:46:00 Refugio Moran [facility]

 

                COMP. METABOLIC PANEL 2022 09:46:00 Refugio Moran [facility]



                ([id number]

 

                CBC WITH DIFF   2022 09:46:00 Refugio Moran [facility]

 

                CBC WITH DIFF   2022 09:46:00 Refugio Moran [facility]

 

                COMP. METABOLIC PANEL 2022 09:46:00 Refugio Moran [facility]



                ([id number])                                         Medical Branch

 

                MAGNESIUM       2022 09:46:00 Refugio Moran [facility]

 

                PHOSPHORUS      2022 09:46:00 Refugio Moran [facility]

 

                POCT GLUCOSE (AUTOMATED) 2022 07:47:00 Marcelle Mercy  [employer]

[facility]

 

                POCT GLUCOSE (AUTOMATED) 2022 07:47:00 Marcelle Mercy  [employer]

[facility]

 

                POCT GLUCOSE (AUTOMATED) 2022 03:40:00 Marcelle Mercy  [employer]

[facility]

 

                POCT GLUCOSE (AUTOMATED) 2022 03:40:00 Marcelle Mercy  [employer]

[facility]

 

                POCT GLUCOSE (AUTOMATED) 2022 00:43:00 Marcelle Mercy  [employer]

[facility]

 

                POCT GLUCOSE (AUTOMATED) 2022 00:43:00 Marcelle Mercy  [employer]

[facility]

 

                BASIC METABOLIC PANEL 2022 23:29:00 Refugio Moran [facility]



                (NA, K, CL, CO2, GLUCOSE,                                 Medica

l Branch



                BUN, CREATININE, CA)                                 

 

                BASIC METABOLIC PANEL 2022 23:29:00 Refugio Moran [facility]



                (NA, K, CL, CO2, GLUCOSE,                                 Medica

l Branch



                BUN, CREATININE, CA)                                 

 

                POCT GLUCOSE (AUTOMATED) 2022 22:28:00 Marcelle Mercy  [employer]

[facility]

 

                POCT GLUCOSE (AUTOMATED) 2022 22:28:00 Marcelle Mercy  [employer]

[facility]

 

                US RETROPERITONEAL 2022 22:27:00 Refugio Moran [facility]

 

                US RETROPERITONEAL 2022 22:27:00 Refugio Moran [facility]

 

                CT ABDOMEN PELVIS WO 2022 20:05:00 Refugio Moran [facility]



                CONTRAST                                        [facility]

 

                CT ABDOMEN PELVIS WO 2022 20:05:00 [facility]

 

                POCT GLUCOSE (AUTOMATED) 2022 19:07:00 MarcelleRachel  [employer]

[facility]

 

                POCT GLUCOSE (AUTOMATED) 2022 19:07:00 MarcelleRachel  [employer]

[facility]

 

                POCT GLUCOSE (AUTOMATED) 2022 18:00:00 Marcelle, Mercy  [employer]

[facility]

 

                POCT GLUCOSE (AUTOMATED) 2022 18:00:00 Mracelle Mercy  [employer]

[facility]

 

                PROTEIN CREAT RATIO URINE 2022 17:45:00 Corby Peña 

[facility]



                RANDOM                                          [facility]

 

                PROTEIN CREAT RATIO URINE 2022 17:45:00 Corby Peña 

[facility]

 

                POCT GLUCOSE (AUTOMATED) 2022 17:25:00 Marcelle Mercy  [employer]

[facility]

 

                POCT GLUCOSE (AUTOMATED) 2022 17:25:00 Marcelle Mercy  [employer]

[facility]

 

                LACTIC ACID WHOLE BLOOD 2022 17:23:00 Refugio Moran [facility]

 

                LACTIC ACID WHOLE BLOOD 2022 17:23:00 Refugio Moran [facility]

 

                BASIC METABOLIC PANEL 2022 17:22:00 Refugio Moran [facility]



                (NA, K, CL, CO2, GLUCOSE,                                 Medica

l Branch



                BUN, CREATININE, CA)                                 

 

                BASIC METABOLIC PANEL 2022 17:22:00 Refugio Moran [facility]



                (NA, K, CL, CO2, GLUCOSE,                                 Medica

l Branch



                BUN, CREATININE, CA)                                 

 

                POCT GLUCOSE (AUTOMATED) 2022 16:07:00 Marcelle Mercy  [employer]

[facility]

 

                POCT GLUCOSE (AUTOMATED) 2022 16:07:00 Marcelle, Mercy  [employer]

[facility]

 

                POCT GLUCOSE (AUTOMATED) 2022 15:04:00 Marcelle Mercy  [employer]

[facility]

 

                POCT GLUCOSE (AUTOMATED) 2022 15:04:00 Marcelle, Mercy  [employer]

[facility]

 

                POCT GLUCOSE (AUTOMATED) 2022 13:57:00 Edsofia, Mercy  [facility]

 

                POCT GLUCOSE (AUTOMATED) 2022 13:57:00 Marcelle Mercy  [facility]

 

                URINE CULTURE   2022 13:50:00 [facility]

 

                URINE CULTURE   2022 13:50:00 [facility]

 

                LACTIC ACID WHOLE BLOOD 2022 12:57:00 Edsofia [facility]

 

                LACTIC ACID WHOLE BLOOD 2022 12:57:00 Marcelle [facility]

 

                POCT GLUCOSE (AUTOMATED) 2022 12:55:00 Marcelle Mercy  [facility]

 

                POCT GLUCOSE (AUTOMATED) 2022 12:55:00 Edsofia Mercy  [facility]

 

                BASIC METABOLIC PANEL 2022 12:51:00 Edionshraddha [facility]



                (NA, K, CL, CO2, GLUCOSE,                                 Medica

l Branch



                BUN, CREATININE, CA)                                 

 

                BASIC METABOLIC PANEL [facility]



                (NA, K, CL, CO2, GLUCOSE,                                 Medica

l Branch



                BUN, CREATININE, CA)                                 

 

                MRSA / MSSA SCREEN BY 2022 10:37:00 Edsofia [facility]

 

                MRSA / MSSA SCREEN BY 2022 10:37:00 Edsofia, [facility]



                PCR, [facility]

 

                URINE DRUG (IMMUNOASSAY) 2022 10:36:00 Marcelle Mercy  [employer]

[facility]



                - COMPREHENSIVE DRUG                                 [facility]



                SCREEN                                          

 

                LACTIC ACID WHOLE BLOOD 2022 10:36:00 Marcelle [facility]

 

                URINE DRUG (LCMSMS) - 2022 10:36:00 Marcelle [facility]



                SYNTHETIC OPIATES PANEL                                 [facility]

 

                LACTIC ACID WHOLE BLOOD 2022 10:36:00 Marcelle [facility]

 

                URINE DRUG (IMMUNOASSAY) 2022 10:36:00 Marcelle [facility]



                SCREEN                                          

 

                URINE DRUG (LCMSMS) - 2022 10:36:00 Marcelle [facility]



                OPIATES PANEL                                   Medical Branch

 

                PHOSPHORUS      2022 08:58:00 Edsofia [facility]

 

                MAGNESIUM       2022 08:58:00 [facility] [place] [facility]

 

                BETA HYDROXY-BUTYRATE 2022 08:58:00 Ashly Ortega [facility]

 

                BASIC METABOLIC PANEL 2022 08:58[facility]00 [facility]



                (NA, K, CL, CO2, GLUCOSE,                                 Medica

l Branch



                BUN, CREATININE, CA)                                 

 

                BETA HYDROXY-BUTYRATE 2022 08:58:00 Ashly Ortega  [facility]

 

                BASIC METABOLIC PANEL 2022 [facility]:58[facility]00 [facility]



                (NA, K, CL, CO2, GLUCOSE,                                 Medica

l Branch



                BUN, CREATININE, CA)                                 

 

                MAGNESIUM       2022 08:58:00 Marcelle [facility]

 

                PHOSPHORUS      2022 08:58:00 Marcelle [facility]

 

                CBC WITH DIFF   2022 06:40:00 [facility] [place] [facility]

 

                LACTIC ACID WHOLE BLOOD 2022 06:40:00 Marcelle [facility]

 

                LACTIC ACID WHOLE BLOOD 2022 06:40:00 Marcelle [facility]

 

                CBC WITH DIFF   2022 06:40:00 [facility] [place] [facility]

 

                POCT GLUCOSE (AUTOMATED) 2022 05:14:00 Marcelle [facility]

 

                POCT GLUCOSE (AUTOMATED) 2022 05:14:00 Rachel Bailey  [facility]

 

                ED BLADDER      2022 04:35:00 Ashly Ortega  [facility]

 

                ED BLADDER      2022 04:35:00 Ashly Ortega  [facility]

 

                POCT GLUCOSE (AUTOMATED) 2022 03:51:00 Rachel Bailey  [facility]

 

                POCT GLUCOSE (AUTOMATED) 2022 03:51:00 Rachel Bailey  [facility]

 

                XR CHEST 1 VW   2022 02:43:07 Ashly Ortega  [facility]

 

                XR ANKLE 3+ VW LEFT 2022 02:43:07 Ashly Ortega  [facility]

 

                XR CHEST 1 VW   2022 02:43:07 Ashly Ortega  [facility]

 

                XR ANKLE 3+ VW LEFT 2022 02:43:07 Ashly Ortega  [facility]

 

                LACTIC ACID WHOLE BLOOD 2022 02:30:00 Ashly Ortega  [facility]

 

                LACTIC ACID WHOLE BLOOD 2022 02:30:00 Ashly Ortega  [facility]

 

                URINALYSIS      2022 01:38:00 Ashly Ortega  [facility]

 

                URINALYSIS      2022 01:38:00 Ashly Ortega  [facility]

 

                POCT GLUCOSE (AUTOMATED) 2022 01:36:00 Ashly Ortega  [facility]

 

                POCT GLUCOSE (AUTOMATED) 2022 01:36:00 Ashly Ortega  [facility]

 

                PHOSPHORUS      2022 01:24:00 Ashly Ortega  [facility]

 

                LIPASE          2022 01:24:00 Ashly Ortega  [facility]

 

                MAGNESIUM       2022 01:24:00 Ashly Ortega  [facility]

 

                TROPONIN I      2022 01:24:00 Ashly Ortega  [facility]

 

                COMP. METABOLIC PANEL 2022 01:24:00 Ashly Ortega  [facility]



                ([id number])                                         [facility]

 

                SALICYLATE      2022 01:24:00 Ashly Ortega  [facility]

 

                ETHANOL         2022 01:24:00 Ashly Ortega  [facility]

 

                COMP. METABOLIC PANEL 2022 01:24:00 Ashly Ortega  [facility]



                ([id number])                                         [facility]

 

                LIPASE          2022 01:24:00 Ashly Ortega  [facility]

 

                TROPONIN I      2022 01:24:00 Ashly Ortega  [facility]

 

                ETHANOL         2022 01:24:00 Ashly Ortega  [facility]

 

                ACETAMINOPHEN   2022 01:24:00 Ashly Ortega  [facility]

 

                PHOSPHORUS      2022 01:24:00 Ashly Ortega  [facility]

 

                MAGNESIUM       2022 01:24:00 Ashly Ortega  [facility]

 

                CT LUMBAR SPINE WO 2022 01:16:52 Ashly Ortega  [facility]



                CONTRAST                                        [facility]

 

                CT THORACIC SPINE WO 2022 01:16:52 Ashly Ortega  [facility]



                CONTRAST                                        [facility]

 

                CT THORACIC SPINE WO 2022 01:16:52 Ashly Ortega  [facility]



                CONTRAST                                        [facility]

 

                CT LUMBAR SPINE WO 2022 01:16:52 Ashly Ortega  [facility]



                CONTRAST                                        [facility]

 

                CT CERVICAL SPINE WO 2022 01:16:24 Ashly Ortega  [facility]



                CONTRAST                                        [facility]

 

                CT HEAD WO CONTRAST 2022 01:16:24 Ashly Ortega  [facility]

 

                CT HEAD WO CONTRAST 2022 01:16:24 Ashly Ortega  [facility]

 

                CT CERVICAL SPINE WO 2022 01:16:24 Ashly Ortega  [facility]



                CONTRAST                                        [facility]

 

                BLOOD CULTURE SCREEN 2022 00:41:00 Ashly Ortega  [facility]

 

                BLOOD CULTURE SCREEN 2022 00:41:00 Ashly Ortega  [facility]

 

                COVID-19 (ID NOW RAPID 2022 00:30:00 Ashly Ortega  [facility]



                TESTING)                                        Medical Branch

 

                LAB ONLY COVID  2022 00:30:00 Ashly Ortega  [place]

[facility]

 

                COVID-19 (ID NOW RAPID 2022 00:30:00 Ashly Ortega  [facility]



                TESTING)                                        Medical Branch

 

                LAB ONLY COVID  2022 00:30:00 Ashly Ortega  [place]

[facility]

 

                POCT GLUCOSE (AUTOMATED) 2022 00:05:00 Ashly Ortega  [facility]

 

                POCT GLUCOSE (AUTOMATED) 2022 00:05:00 Ashly Ortega  [facility]

 

                ACUTE CARE VENOUS BLOOD 2022 23:53:00 Ashly Ortega  [facility]

 

                ACUTE CARE VENOUS BLOOD 2022 23:53:00 Ashly Ortega  [facility]

 

                CBC WITH DIFF   2022 23:48:00 Ashly Ortega  [facility]

 

                CBC WITH DIFF   2022 23:48:00 Ashly Ortega  [facility]

 

                LACTIC ACID WHOLE BLOOD 2022 23:47:00 Ashly Ortega  [facility]

 

                LACTIC ACID WHOLE BLOOD 2022 23:47:00 Ashly Ortega  [facility]

 

                HB ECG ROUTINE & RHYTHM 2022 23:31:40 Ashly Ortega  [facility]

 

                HB ECG ROUTINE & RHYTHM 2022 23:31:40 Ashly Ortega  [facility]

 

                EMERGENCY DEPARTMENT 2022 05:01:00 Doctor Unassigned, No U

[facility]



                DOCUMENTS                       [facility]

 

                EMERGENCY DEPARTMENT 2022 05:01:00 Doctor Unassigned, No U

[facility]

 

                HOSPITAL ADMISSION 2022 05:01:00 [facility]

 

                [facility] FEMUR LEFT W CONTRAST 2022 02:34:07 Varinder Sol Uni

[facility]

 

                CT FEMUR LEFT W CONTRAST 2022 02:34:07 Varinder Sol Uni

[facility]

 

                POCT GLUCOSE(AGE >30DAYS) 2022 01:30:00 Varinder Sol Un

[facility]

 

                POCT GLUCOSE(AGE >30DAYS) 2022 01:30:00 [facility]

[facility]

 

                POCT GLUCOSE (AUTOMATED) 2022 01:28:00 Alondra Santos [facility]

 

                POCT GLUCOSE (AUTOMATED) 2022 01:28:00 Alondra Santos [facility]

 

                POCT GLUCOSE (AUTOMATED) 2022 00:38:00 Alondra Santos [facility]

 

                POCT GLUCOSE (AUTOMATED) 2022 00:38:00 Alondra Santos [facility]

 

                URINALYSIS      2022-07-15 23:58:00 Tom [facility]

 

                URINALYSIS      2022-07-15 23:58:00 Alondra Santos [facility]

 

                POCT GLUCOSE (AUTOMATED) 2022-07-15 23:26:00 Alondra Santos [facility]

 

                POCT GLUCOSE (AUTOMATED) 2022-07-15 23:26:00 Alondra Santos [facility]

 

                BASIC METABOLIC PANEL 2022-07-15 22:24:00 Alondra Santos [facility]



                (NA, K, CL, CO2, GLUCOSE,                                 Medica

l Branch



                BUN, CREATININE, CA)                                 

 

                CBC WITH DIFF   2022-07-15 22:24:00 Tom [facility]

 

                COVID-19 (ID NOW RAPID 2022-07-15 22:24:00 Alondra Santos [facility]



                TESTING)                                        Medical Branch

 

                CBC WITH DIFF   2022-07-15 22:24:00 Alondar Santos [facility]



                                                                Medical Branch

 

                BASIC METABOLIC PANEL 2022-07-15 22:24:00 Alondra Santos [facility]



                (NA, K, CL, CO2, GLUCOSE,                                 Medica

l Branch



                BUN, CREATININE, CA)                                 

 

                COVID-19 (ID NOW RAPID 2022-07-15 22:24:00 Alondra Santos [facility]



                TESTING)                                        Medical Branch

 

                LAB ONLY COVID  2022-07-15 22:24:00 [facility]



                INTERPRETATION                                  [facility]

 

                EMERGENCY SERVICES 2022-07-15 05:01:00 Doctor Unassigned, No Uni

[facility] [place]



                AGREEMENTS AND                  Name            Medical Branch



                AUTHORIZATIONS                                  

 

                EMERGENCY DEPARTMENT 2022-07-15 05:01:00 Doctor Unassigned, No [facility]



                DOCUMENTS                       Name            Medical Branch

 

                LIPASE          2022 10:37:00 Radha Fofana [facility]

 

                COMP. METABOLIC PANEL 2022 10:37:00 Radha Fofana [facility]



                ([id number])                                         [facility]

 

                CBC WITH DIFF   2022 10:37:00 Brigido Radha [facility]

 

                AC PANEL 21 + LACTIC ACID 2022 10:37:00 [facility]

 

                CBC WITH DIFF   2022 10:37:00 Radha Fofana [facility]

 

                COMP. METABOLIC PANEL 2022 10:37:00 Radha Fofana [facility]



                ([id number])                                         [facility]

 

                AC PANEL 21 + LACTIC ACID 2022 10:37:00 Radha Fofana

[facility]

 

                LIPASE          2022 10:37:00 Radha Fofana [facility]

 

                URINALYSIS      2022 10:24:00 Brigido Radha [facility]

 

                URINALYSIS      2022 10:24:00 Radha Fofana [facility]

 

                EMERGENCY SERVICES 2022 05:01:00 Doctor Unassigned, No Uni

[facility] [place]



                AGREEMENTS AND                  Name            Medical Branch



                AUTHORIZATIONS                                  

 

                POCT GLUCOSE (AUTOMATED) 2022 22:53:00 Radha Fofana Uni

[facility] [place]



                                                                Medical Branch

 

                POCT GLUCOSE (AUTOMATED) 2022 22:53:00 Radha Fofana Uni

[facility] [place]



                                                                Medical Branch

 

                POCT GLUCOSE (AUTOMATED) 2022 12:17:00 Radha Fofana Uni

[facility] [place]



                                                                Medical Branch

 

                POCT GLUCOSE (AUTOMATED) 2022 12:17:00 Radha Fofana Uni

[facility] [place]



                                                                Medical Branch

 

                POCT GLUCOSE (AUTOMATED) 2022 04:47:00 Radha Fofana Uni

[facility] [place]



                                                                Medical Branch

 

                POCT GLUCOSE (AUTOMATED) 2022 04:47:00 Radha Fofana Uni

[facility] [place]



                                                                Medical Branch

 

                POCT GLUCOSE (AUTOMATED) 2022 01:43:00 Radha Fofana Uni

[facility] [place]



                                                                Medical Branch

 

                POCT GLUCOSE (AUTOMATED) 2022 01:43:00 Radha Fofana Uni

[facility] [place]



                                                                Medical Branch

 

                POCT GLUCOSE (AUTOMATED) 2022 21:51:00 Radha Fofana Uni

[facility] [place]



                                                                Medical Branch

 

                POCT GLUCOSE (AUTOMATED) 2022 21:51:00 Radha Fofana Uni

[facility] [place]



                                                                Medical Branch

 

                POCT GLUCOSE (AUTOMATED) 2022 17:04:00 Radha Fofana Uni

[facility] [place]



                                                                Medical Branch

 

                POCT GLUCOSE (AUTOMATED) 2022 17:04:00 Radha Fofana Uni

[facility] [place]



                                                                Medical Branch

 

                POCT GLUCOSE (AUTOMATED) 2022 13:09:00 Radha Fofana Uni

[facility] [place]



                                                                Medical Branch

 

                POCT GLUCOSE (AUTOMATED) 2022 13:09:00 Radha Fofana Uni

[facility] [place]



                                                                Medical Branch

 

                POCT GLUCOSE (AUTOMATED) 2022 08:32:00 Radha Fofana Uni

[facility] [place]



                                                                Medical Branch

 

                POCT GLUCOSE (AUTOMATED) 2022 08:32:00 Radha Fofana Uni

[facility] [place]



                                                                Medical Branch

 

                POCT GLUCOSE (AUTOMATED) 2022 05:45:00 Radha Fofana Uni

[facility] [place]



                                                                Medical Branch

 

                POCT GLUCOSE (AUTOMATED) 2022 05:45:00 Radha Fofana Uni

[facility] [place]



                                                                Medical Branch

 

                POCT GLUCOSE (AUTOMATED) 2022 02:43:00 Radha Fofana

[facility]

 

                POCT GLUCOSE (AUTOMATED) 2022 02:43:00 Radha Fofana

[facility]

 

                POCT GLUCOSE (AUTOMATED) 2022 22:37:00 Radha Fofana

[facility]

 

                POCT GLUCOSE (AUTOMATED) 2022 22:37:00 Radha Fofana

[facility]

 

                POCT GLUCOSE (AUTOMATED) 2022 18:04:00 Radha Fofana

[facility]

 

                POCT GLUCOSE (AUTOMATED) 2022 18:04:00 Radha Fofana

[facility]

 

                BASIC METABOLIC PANEL 2022 14:22:00 Tyler Phillipsgar    Univer

[facility] [place]



                (NA, K, CL, CO2, GLUCOSE,                                 Medica

l Branch



                BUN, CREATININE, CA)                                 

 

                BASIC METABOLIC PANEL 2022 14:22:00 [facility] Noe    Univer

[facility] [place]



                (NA, K, CL, CO2, GLUCOSE,                                 Medica

l Branch



                BUN, CREATININE, CA)                                 

 

                POCT GLUCOSE (AUTOMATED) 2022 13:30:00 Radha Fofana

[facility]

 

                POCT GLUCOSE (AUTOMATED) 2022 13:30:00 Radha Fofana

[facility]

 

                CRITICAL CARE   2022 11:11:00 Radha Fofana [facility]

 

                CRITICAL CARE   2022 11:11:00 Radha Fofana [facility]

 

                POCT GLUCOSE (AUTOMATED) 2022 11:01:00 Radha Fofana

[facility]

 

                POCT GLUCOSE (AUTOMATED) 2022 11:01:00 [facility]

 

                POCT GLUCOSE (AUTOMATED) 2022 07:19:00 Radha Fofana

[facility]

 

                POCT GLUCOSE (AUTOMATED) 2022 07:19:00 Radha Fofana

[facility]

 

                BASIC METABOLIC PANEL 2022 05:53:00 Radha Fofana [facility]



                (NA, K, CL, CO2, GLUCOSE,                                 Medica

l Branch



                BUN, CREATININE, CA)                                 

 

                BASIC METABOLIC PANEL 2022 05:53:00 Radha Fofana [facility]



                (NA, K, CL, CO2, GLUCOSE,                                 Medica

l Branch



                BUN, CREATININE, CA)                                 

 

                POCT GLUCOSE (AUTOMATED) 2022 04:41:00 Radha Fofana [facility]

 

                POCT GLUCOSE (AUTOMATED) 2022 04:41:00 Radha Fofana [facility]

 

                URINALYSIS      2022 03:59:00 Radha Fofana [facility]

 

                URINE CULTURE   2022 03:59:00 Radha Fofana [facility]

 

                URINALYSIS      2022 03:59:00 Radha Fofana [facility]

 

                URINE CULTURE   2022 03:59:00 Radha Fofana [facility]

 

                POCT GLUCOSE (AUTOMATED) 2022 03:48:00 Radha Fofana [facility]

 

                POCT GLUCOSE (AUTOMATED) 2022 03:48:00 Radha Fofana [facility]

 

                AC PANEL 21 + LACTIC ACID 2022 02:23:00 Radha Fofana [facility]

 

                AC PANEL 21 + LACTIC ACID 2022 02:23:00 Radha Fofana [facility]

 

                LIPASE          2022 02:22:00 Radha Fofana [facility]

 

                COMP. METABOLIC PANEL 2022 02:22:00 Radha Fofana [facility]



                ([id number])                                         Medical Branch

 

                CBC WITH DIFF   2022 02:22:00 Radha Fofana [facility]

 

                COVID-19 (ID NOW RAPID 2022 02:22:00 Radha Fofana [facility]



                TESTING)                                        Medical Branch

 

                LAB ONLY COVID  2022 02:22:00 Radha Fofana [facility]



                INTERPRETATION                                  [facility]

 

                CBC WITH DIFF   2022 02:22:00 Radha Fofana [facility]

 

                COMP. METABOLIC PANEL 2022 02:22:00 Radha Fofana [facility]



                ([id number])                                         Medical Branch

 

                LIPASE          2022 02:22:00 Radha Fofana o

[facility] [place]



                                                                Medical Branch

 

                COVID-19 (ID NOW RAPID 2022 02:22:00 Radha Fofana [facility]



                TESTING)                                        Medical Branch

 

                LAB ONLY COVID  2022 02:22:00 Radah Fofana o

[facility] [place]



                INTERPRETATION                                  Medical Branch

 

                HOSPITAL ADMISSION 2022 05:01:00 Doctor Unassigned, No Uni

[facility]

 

                EMERGENCY DEPARTMENT 2022 05:01:00 Doctor Unassigned, No U

[facility] 

[place]



                DOCUMENTS                       [facility]

 

                HOSPITAL ADMISSION 2022 05:01:00 Doctor Unassigned, No Uni

[facility]

 

                POCT GLUCOSE (AUTOMATED) 2022 17:03:00 Edwardo Lopez   Uni

[facility]

 

                POCT GLUCOSE (AUTOMATED) 2022 16:09:00 Edwardo Lopez   Uni

[facility]

 

                HEPATIC FUNCTION PANEL 2022 09:01:00 Ashly Lees [facility]



                ([id number]) (ALB,T.PRO,BILI                                 Medical 

Branch



                T,BU/BC,ALT,AST,ALK PHOS)                                 

 

                BASIC METABOLIC PANEL 2022 09:01:00 Ashly Lees [facility]



                (NA, K, CL, CO2, GLUCOSE,                                 Medica

l Branch



                BUN, CREATININE, CA)                                 

 

                CBC WITH DIFF   2022 09:01:00 Ashly Lees [facility]

 

                CBC WITH DIFF   2022 09:01:00 Ashly Lees [facility]

 

                BASIC METABOLIC PANEL 2022 09:01:00 Ashly Lees [facility]



                (NA, K, CL, CO2, GLUCOSE,                                 Medica

l Branch



                BUN, CREATININE, CA)                                 

 

                HEPATIC FUNCTION PANEL 2022 09:01:00 Ashly Lees [facility]



                ([id number]) (ALB,T.PRO,[facility] 

Branch



                T,BU/BC,ALT,AST,ALK PHOS)                                 

 

                POCT GLUCOSE (AUTOMATED) 2022 08:58:00 Edwardo Lopez   [facility]

 

                POCT GLUCOSE (AUTOMATED) 2022 06:53:00 Edwardo Lopez   [facility]

 

                POCT GLUCOSE (AUTOMATED) 2022 05:31:00 Edwardo Lopez   [facility]

 

                POCT GLUCOSE (AUTOMATED) 2022 02:16:00 Edwardo Lopez   [facility]

 

                POCT GLUCOSE (AUTOMATED) 2022 21:50:00 Edwardo Lopez

[facility]

 

                BASIC METABOLIC PANEL 2022 20:45:00 [facility] [place] [facility]



                (NA, K, CL, CO2, GLUCOSE,                                 Medica

l Branch



                BUN, CREATININE, CA)                                 

 

                CBC WITH DIFF   2022 20:45:00 [facility]

 

                BASIC METABOLIC PANEL 2022 20:45:00 [facility]



                (NA, K, CL, CO2, GLUCOSE,                                 Medica

l Branch



                BUN, CREATININE, CA)                                 

 

                CBC WITH DIFF   2022 20:45:00 [facility]

 

                POCT GLUCOSE (AUTOMATED) 2022 16:44:00 Edwardo Lopez   [facility]

 

                POCT GLUCOSE (AUTOMATED) 2022 13:41:00 Edwardo Lopez   [facility]

 

                URINE CULTURE   2022 06:41:00 Josse Giles [facility]

 

                URINE CULTURE   2022 06:41:00 Josse Giles [facility]

 

                POCT GLUCOSE (AUTOMATED) 2022 06:10:00 Josse Giles [facility]

 

                CT ABDOMEN PELVIS W 2022 05:08:00 Josse [facility]

 

                CT ABDOMEN PELVIS W 2022 05:08:00 Josse [facility]

 

                URINALYSIS      2022 04:34:00 Josse Giles [facility]

 

                COVID-19 (ID NOW RAPID 2022 04:34:00 Josse Giles [facility]



                TESTING)                                        Medical Branch

 

                LAB ONLY COVID  2022 04:34:00 Josse Giles [facility]

 

                URINALYSIS      2022 04:34:00 Josse Giles [facility]

 

                COVID-19 (ID NOW RAPID 2022 04:34:00 Josse Giles [facility]



                TESTING)                                        Medical Branch

 

                LAB ONLY COVID  2022 04:34:00 Josse Giles [facility]



                INTERPRETATION                                  [facility]

 

                LIPASE          2022 03:57:00 Josse Giles [facility]

 

                COMP. METABOLIC PANEL 2022 03:57:00 Josse Giles [facility]



                ([id number])                                         [facility]

 

                CBC WITH DIFF   2022 03:57:00 Josse Giles [facility]

 

                CBC WITH DIFF   2022 03:57:00 Josse Giles [facility]

 

                COMP. METABOLIC PANEL 2022 03:57:00 Josse Giles [facility]



                ([id number])                                         Medical Branch

 

                LIPASE          2022 03:57:00 Josse Giles [facility]

 

                EMERGENCY DEPARTMENT 2022 05:01:00 Doctor Unassigned, No U

[facility] 

[place]



                DOCUMENTS                       [facility]

 

                HOSPITAL ADMISSION 2022 05:01:00 Doctor Unassigned, No Uni

[facility]

 

                POCT GLUCOSE (AUTOMATED) 2022-06-10 21:48:00 Rachel Bailey  Uni

[facility]

 

                POCT GLUCOSE (AUTOMATED) 2022-06-10 17:03:00 Rachel Bailey  Uni

[facility]

 

                POCT GLUCOSE (AUTOMATED) 2022-06-10 17:03:00 EdRachel black  Uni

[facility]

 

                POCT GLUCOSE (AUTOMATED) 2022-06-10 12:44:00 Rachel Bailey  Uni

[facility]

 

                POCT GLUCOSE (AUTOMATED) 2022-06-10 12:44:00 EdSwapna blacky  Uni

[facility]

 

                BASIC METABOLIC PANEL 2022-06-10 10:29:00 Edwardo Lopez   [facility]



                (NA, K, CL, CO2, GLUCOSE,                                 Medica

l Branch



                BUN, CREATININE, CA)                                 

 

                CBC WITH DIFF   2022-06-10 10:29:00 [facility]

 

                MAGNESIUM       2022-06-10 10:29:00 [facility]

 

                MAGNESIUM       2022-06-10 10:29:00 [facility]

 

                BASIC METABOLIC PANEL 2022-06-10 10:29:00 Edwardo Lopez   [facility]



                (NA, K, CL, CO2, GLUCOSE,                                 Medica

l Branch



                BUN, CREATININE, CA)                                 

 

                CBC WITH DIFF   2022-06-10 10:29:00 [facility]

 

                POCT GLUCOSE (AUTOMATED) 2022-06-10 10:28:00 Edionwe Mercy  Uni

[facility]

 

                POCT GLUCOSE (AUTOMATED) 2022-06-10 10:28:00 Edionwe Mercy  Uni

[facility]

 

                POCT GLUCOSE (AUTOMATED) 2022-06-10 05:33:00 Edionwe, [facility]  Uni

[facility] [place]



                                                                Medical Branch

 

                POCT GLUCOSE (AUTOMATED) 2022-06-10 05:33:00 Edionwe, [facility]cy  Uni

[facility] of [facility]

 

                POCT GLUCOSE (AUTOMATED) 2022-06-10 04:37:00 Edionwe, Mercy  Uni

[facility] [place]



                                                                Medical Branch

 

                POCT GLUCOSE (AUTOMATED) 2022-06-10 04:37:00 Edionwe, Mercy  Uni

[facility] [place]



                                                                Medical Branch

 

                POCT GLUCOSE (AUTOMATED) 2022-06-10 02:29:00 Edionwe, Mercy  Uni

[facility] [place]



                                                                Medical Branch

 

                POCT GLUCOSE (AUTOMATED) 2022-06-10 02:29:00 Edionwe, Mercy  Uni

[facility] [place]



                                                                Medical Branch

 

                POCT GLUCOSE (AUTOMATED) 2022-06-10 00:52:00 Edionwe, Mercy  Uni

[facility] of [facility]

 

                POCT GLUCOSE (AUTOMATED) 2022-06-10 00:52:00 Edionwe, Mercy  Uni

versity [facility]

 

                POCT GLUCOSE (AUTOMATED) 2022 21:52:00 Marcelle, Mercy  Uni

[facility]

 

                POCT GLUCOSE (AUTOMATED) 2022 21:52:00 Marcelle, Mercy  Uni

[facility]

 

                POCT GLUCOSE (AUTOMATED) 2022 16:42:00 Marcelle, Mercy  Uni

[facility]

 

                POCT GLUCOSE (AUTOMATED) 2022 16:42:00 Marcelle Merclissa Blanco

[facility]

 

                XR CHEST 1 VW   2022 14:05:00 [facility]

 

                XR SKULL <4 VW  2022 14:05:00 [facility]

 

                XR ABDOMEN 2 VW 2022 14:05:00 [facility]

 

                XR ABDOMEN 2 VW 2022 14:05:00 [facility]

 

                XR CHEST 1 VW   2022 14:05:00 [facility]

 

                XR SKULL <4 VW  2022 14:05:00 [facility]

 

                POCT GLUCOSE (AUTOMATED) 2022 12:47:00 Marcelle, Mercy  [facility]

 

                POCT GLUCOSE (AUTOMATED) 2022 12:47:00 Marcelle [facility]  [facility]

 

                POCT GLUCOSE (AUTOMATED) 2022 08:59:00 Marcelle [facility]  [facility]

 

                POCT GLUCOSE (AUTOMATED) 2022 08:59:00 Marcelle [facility]  [facility]

 

                GLYCOSYLATED HEMOGLOBIN 2022 08:56:00 Refugio Moran [facility]



                (A1C)                                           [facility]

 

                CBC WITH DIFF   2022 08:56:00 Refugio Moran [facility]

 

                BASIC METABOLIC PANEL 2022 08:56:00 Refugio Moran [facility]



                (NA, K, CL, CO2, GLUCOSE,                                 Medica

l Branch



                BUN, CREATININE, CA)                                 

 

                MAGNESIUM       2022 08:56:00 Refugio Moran [facility]

 

                PHOSPHORUS      2022 08:56:00 [facility]

 

                PHOSPHORUS      2022 08:56:00 [facility]

 

                MAGNESIUM       2022 08:56:00 [facility]

 

                BASIC METABOLIC PANEL 2022 08:56:00 Refugio Moran [facility]



                (NA, K, CL, CO2, GLUCOSE,                                 Medica

l Branch



                BUN, CREATININE, CA)                                 

 

                CBC WITH DIFF   2022 08:56:00 [facility]

 

                GLYCOSYLATED HEMOGLOBIN 2022 08:56:00 Refugio Moran [facility]



                (A1C)                                           [facility]

 

                POCT GLUCOSE (AUTOMATED) 2022 04:27:00 Rachel Bailey  Uni

[facility]

 

                POCT GLUCOSE (AUTOMATED) 2022 04:27:00 Rachel Bailey  Uni

[facility]

 

                POCT GLUCOSE (AUTOMATED) 2022 01:11:00 Edsofia, [facility]cy  Uni

[facility]

 

                POCT GLUCOSE (AUTOMATED) 2022 01:11:00 Swapna Baileyy  Uni

[facility]

 

                POCT GLUCOSE (AUTOMATED) 2022 21:02:00 Edsofia, Mercy  Uni

[facility] Branch

 

                POCT GLUCOSE (AUTOMATED) 2022 21:02:00 Rachel Bailey  Uni

[facility]

 

                POCT GLUCOSE (AUTOMATED) 2022 16:08:00 Edsofia, Mercy  Uni

[facility]

 

                POCT GLUCOSE (AUTOMATED) 2022 16:08:00 Edsofia [facility]cy  Uni

[facility]

 

                POCT GLUCOSE (AUTOMATED) 2022 12:39:00 Edsofia, Mercy  Uni

[facility]

 

                POCT GLUCOSE (AUTOMATED) 2022 12:39:00 Rachel Bailey  Uni

[facility]

 

                LACTIC ACID WHOLE BLOOD 2022 09:25:00 Shelton Cunningham [facility]

 

                CBC WITH DIFF   2022 09:25:00 Marcelle [facility]

 

                BASIC METABOLIC PANEL 2022 09:25:00 Marcelle [facility]



                (NA, K, CL, CO2, GLUCOSE,                                 Medica

l Branch



                BUN, CREATININE, CA)                                 

 

                BASIC METABOLIC PANEL 2022 09:25:00 Marcelle [facility]



                (NA, K, CL, CO2, GLUCOSE,                                 Medica

l Branch



                BUN, CREATININE, CA)                                 

 

                CBC WITH DIFF   2022 09:25:00 Marcelle [facility]

 

                LACTIC ACID WHOLE BLOOD 2022 09:25:00 Octavio [facility]

 

                POCT GLUCOSE (AUTOMATED) 2022 08:56:00 [facility] [facility]  [facility]

 

                POCT GLUCOSE (AUTOMATED) 2022 08:56:[address] [facility] [facility]

 

                POCT GLUCOSE (AUTOMATED) 2022 04:51:[address] [facility] [facility]

 

                POCT GLUCOSE (AUTOMATED) 2022 04:51:[address] [facility]

 

                URINALYSIS      2022 02:47:00 [facility]

 

                URINALYSIS      2022 02:47:00 Shelton Cunningham [facility]

 

                POCT GLUCOSE (AUTOMATED) 2022 02:29:00 Shelton Cunningham [facility]

 

                POCT GLUCOSE (AUTOMATED) 2022 02:29:00 Shelton Cunningham [facility]

 

                HB ECG ROUTINE & RHYTHM 2022 00:33:38 Octavio [facility]

 

                HB ECG ROUTINE & RHYTHM 2022 00:33:38 Octavio [facility]

 

                URINALYSIS      2022 00:28:00 Octavio [facility]

 

                URINE CULTURE   2022 00:28:00 Octavio [facility]

 

                URINALYSIS      2022 00:28:00 Octavio [facility]

 

                URINE CULTURE   2022 00:28:00 Octavio [facility]

 

                CBC WITH DIFF   2022 00:25:00 Octavio [facility]

 

                COMP. METABOLIC PANEL 2022 00:25:00 Octavio [facility]



                ([id number])                                         Medical Branch

 

                LIPASE          2022 00:25:00 Octavio [facility]

 

                LACTIC ACID WHOLE BLOOD 2022 00:25:00 Octavio [facility]

 

                ACUTE CARE VENOUS BLOOD 2022 00:25:00 Octavio [facility]

 

                BLOOD CULTURE SCREEN 2022 00:25:00 Octavio [facility]

 

                BLOOD CULTURE SCREEN 2022 00:25:00 [facility]

 

                LIPASE          2022 00:25:00 Octavio [facility]

 

                COMP. METABOLIC PANEL 2022 00:25:00 Octavio [facility]



                ([id number])                                         [facility]

 

                ACUTE CARE VENOUS BLOOD 2022 00:25:00 Octavio [facility]

 

                CBC WITH DIFF   2022 00:25:00 Octavio [facility]

 

                LACTIC ACID WHOLE BLOOD 2022 00:25:00 Octavio [facility]

 

                EMERGENCY DEPARTMENT 2022 05:01:00 Doctor Unassigned, No U

[facility] 

[place]



                DOCUMENTS                       [facility]

 

                HOSPITAL ADMISSION 2022 05:01:00 Doctor Unassigned, No Uni

versity of [facility]

 

                CBC WITH DIFF   2022 17:57:00 [facility]

 

                BASIC METABOLIC PANEL 2022 17:57:00 [facility]



                (NA, K, CL, CO2, GLUCOSE,                                 Medica

l Branch



                BUN, CREATININE, CA)                                 

 

                MAGNESIUM       2022 17:57:00 [facility]

 

                MAGNESIUM       2022 17:57:00 [facility]

 

                BASIC METABOLIC PANEL 2022 17:57:00 [facility]



                (NA, K, CL, CO2, GLUCOSE,                                 Medica

l Branch



                BUN, CREATININE, CA)                                 

 

                CBC WITH DIFF   2022 17:57:00 [facility]

 

                POCT GLUCOSE (AUTOMATED) 2022 16:49:00 Terminella, Efrain U

niversity of 

[facility]

 

                POCT GLUCOSE (AUTOMATED) 2022 16:49:00 Terminella, Efrain U

niversity of 

[facility]

 

                POCT GLUCOSE (AUTOMATED) 2022 12:56:00 Terminella, Efrain U

niversity of 

[facility]

 

                POCT GLUCOSE (AUTOMATED) 2022 12:56:00 Terminella, Efrain U

niversity of 

[facility]

 

                POCT GLUCOSE (AUTOMATED) 2022 09:41:00 Terminella, Efrain U

niversity of 

[facility]

 

                POCT GLUCOSE (AUTOMATED) 2022 09:41:00 Terminella, Efrain U

niversity of 

[facility]

 

                POCT GLUCOSE (AUTOMATED) 2022 05:08:00 Terminella, Efrain U

niversity of 

[facility]

 

                POCT GLUCOSE (AUTOMATED) 2022 05:08:00 Terminella, Ferain U

niversity of 

[facility]

 

                POCT GLUCOSE (AUTOMATED) 2022 01:26:00 Terminella, Efrain U

niversity of 

[facility]

 

                POCT GLUCOSE (AUTOMATED) 2022 01:26:00 Terminella, Efrain U

niversity of 

[facility]

 

                POCT GLUCOSE (AUTOMATED) 2022 21:41:00 Terminella, Efrain U

niversity of 

[facility]

 

                POCT GLUCOSE (AUTOMATED) 2022 21:41:00 Terminella, Efrain U

niversity of 

[facility]

 

                POCT GLUCOSE (AUTOMATED) 2022 17:07:00 Terminella, Efrain U

niversity of 

[facility]

 

                POCT GLUCOSE (AUTOMATED) 2022 17:07:00 Terminella, Efrain U

niversity of 

[facility]

 

                POCT GLUCOSE (AUTOMATED) 2022 14:01:00 Terminella, Efrain U

niversity of 

[facility]

 

                POCT GLUCOSE (AUTOMATED) 2022 14:01:00 Terminella, Efrain U

niversity of 

[facility]

 

                POCT GLUCOSE (AUTOMATED) 2022 09:18:00 Terminella, Efrain U

niversity of 

[facility]

 

                POCT GLUCOSE (AUTOMATED) 2022 09:18:00 Terminella, Efrain U

niversity of 

[facility]

 

                POCT GLUCOSE (AUTOMATED) 2022 01:43:00 Terminella, Efrain U

niversity of 

[facility]

 

                POCT GLUCOSE (AUTOMATED) 2022 01:43:00 Terminella, Efrain U

niversity of 

[facility]

 

                POCT GLUCOSE (AUTOMATED) 2022 21:24:00 AlbTobias gupta Uni

versity of [facility]

 

                POCT GLUCOSE (AUTOMATED) 2022 21:24:00 Albdamianami Tobias Uni

versity of [facility]

 

                POCT GLUCOSE (AUTOMATED) 2022 16:25:00 AlbTobias gupta Uni

versity of [facility]

 

                POCT GLUCOSE (AUTOMATED) 2022 16:25:00 AlbTobias gupta Uni

versity of [facility]

 

                URINALYSIS      2022 15:46:00 PhillipsTj valleal    [facility]

 

                URINE CULTURE   2022 15:46:00 Phillips, Simran    [facility]

 

                URINALYSIS      2022 15:46:00 Phillips Simran    [facility]

 

                URINE CULTURE   2022 15:46:00 Phillips, Simran    [facility]

 

                POCT GLUCOSE (AUTOMATED) 2022 15:36:00 AlbTobias gupta Uni

versity of [facility]

 

                POCT GLUCOSE (AUTOMATED) 2022 15:36:00 Tobias Hernandez Uni

versity of [facility]

 

                POCT GLUCOSE (AUTOMATED) 2022 14:25:00 AlbTobias gupta Uni

versity of [facility]

 

                POCT GLUCOSE (AUTOMATED) 2022 14:25:00 Tobias Hernandez Uni

versity of [facility]

 

                BASIC METABOLIC PANEL 2022 13:45:00 Tobias Hernandez Univer

[facility] [place]



                (NA, K, CL, CO2, GLUCOSE,                                 Medica

l Branch



                BUN, CREATININE, CA)                                 

 

                BASIC METABOLIC PANEL 2022 13:45:00 Tobias Hernandez Univer

[facility] [place]



                (NA, K, CL, CO2, GLUCOSE,                                 Medica

l Branch



                BUN, CREATININE, CA)                                 

 

                POCT GLUCOSE (AUTOMATED) 2022 13:34:00 Tobias Hernandez Uni

versity of [facility]

 

                POCT GLUCOSE (AUTOMATED) 2022 13:34:00 Tobias Hernandez Uni

[facility]

 

                CRITICAL CARE   2022 13:05:05 [facility]

 

                CRITICAL CARE   2022 13:05:05 [facility]

 

                POCT GLUCOSE (AUTOMATED) 2022 12:23:00 Tobias Hernandez Uni

versity of [facility]

 

                POCT GLUCOSE (AUTOMATED) 2022 12:23:00 Tobias Hernandez Uni

versity of [facility]

 

                POCT GLUCOSE (AUTOMATED) 2022 11:24:00 AlbTobias gupta Uni

versity of [facility]

 

                POCT GLUCOSE (AUTOMATED) 2022 11:24:00 Tobias Hernandez Uni

versity of [facility]

 

                POCT GLUCOSE (AUTOMATED) 2022 10:33:00 AlbTobias gupta Uni

versity of [facility]

 

                POCT GLUCOSE (AUTOMATED) 2022 10:33:00 AlbTobias gupta Uni

versity of [facility]

 

                BASIC METABOLIC PANEL 2022 09:32:00 Tobias Hernandez Univer

[facility] [place]



                (NA, K, CL, CO2, GLUCOSE,                                 Medica

l Branch



                BUN, CREATININE, CA)                                 

 

                BASIC METABOLIC PANEL 2022 09:32:00 Tobias Hernandez Univer

[facility] [place]



                (NA, K, CL, CO2, GLUCOSE,                                 Medica

l Branch



                BUN, CREATININE, CA)                                 

 

                POCT GLUCOSE (AUTOMATED) 2022 09:30:00 Tobias Hernandez Uni

versity [facility]

 

                POCT GLUCOSE (AUTOMATED) 2022 09:30:00 AlbTobias gupta Uni

versity [facility]

 

                POCT GLUCOSE (AUTOMATED) 2022 08:27:00 AlbdamianamiTobias Uni

versity of [facility]

 

                POCT GLUCOSE (AUTOMATED) 2022 08:27:00 AlbdamianamiTobias Uni

versity of [facility]

 

                POCT GLUCOSE (AUTOMATED) 2022 07:23:00 Tobias Hernandez Uni

versity [facility]

 

                POCT GLUCOSE (AUTOMATED) 2022 07:23:00 AlbTobias gupta Uni

versity [facility]

 

                POCT GLUCOSE (AUTOMATED) 2022 06:22:00 AlbTobias gupta Uni

versity [facility]

 

                POCT GLUCOSE (AUTOMATED) 2022 06:22:00 AlbTobias gupta Uni

[facility]

 

                BASIC METABOLIC PANEL 2022 05:30:00 Tobias Hernandez Univer

[facility] [place]



                (NA, K, CL, CO2, GLUCOSE,                                 Medica

l Branch



                BUN, CREATININE, CA)                                 

 

                BASIC METABOLIC PANEL 2022 05:30:00 Tobias Hernandez Univer

[facility] [place]



                (NA, K, CL, CO2, GLUCOSE,                                 Medica

l Branch



                BUN, CREATININE, CA)                                 

 

                POCT GLUCOSE (AUTOMATED) 2022 05:27:00 Tobias Hernandez Uni

versity [facility]

 

                POCT GLUCOSE (AUTOMATED) 2022 05:27:00 AlbTobias gupta Uni

versity of [facility]

 

                POCT GLUCOSE (AUTOMATED) 2022 04:23:00 AlbTobias gupta Uni

versity [facility]

 

                POCT GLUCOSE (AUTOMATED) 2022 04:23:00 Tobias Hernandez [facility]

 

                POCT GLUCOSE (AUTOMATED) 2022 03:19:00 AlbTobias gupta [facility]

[facility]

 

                POCT GLUCOSE (AUTOMATED) 2022 03:19:00 AlbTobias gupta Uni

[facility]

 

                POCT GLUCOSE (AUTOMATED) 2022 02:24:00 AlbTobias gupta Uni

[facility]

 

                POCT GLUCOSE (AUTOMATED) 2022 02:24:00 Albcandy Tobias [facility]

 

                KETONES URINE   2022 01:49:00 AlbRUST [facility]

 

                KETONES URINE   2022 01:49:00 [facility]

 

                BETA HYDROXY-BUTYRATE 2022 01:44:00 Phillips, [facility]

 

                BASIC METABOLIC PANEL 2022 01:44:00 [facility]



                (NA, K, CL, CO2, GLUCOSE,                                 Medica

l Branch



                BUN, CREATININE, CA)                                 

 

                BETA HYDROXY-BUTYRATE 2022 01:44:00 Phillips [facility]

 

                BASIC METABOLIC PANEL 2022 01:44:00 Albustami, Tobias Univer

[facility] [place]



                (NA, K, CL, CO2, GLUCOSE,                                 Medica

l Branch



                BUN, CREATININE, CA)                                 

 

                POCT GLUCOSE (AUTOMATED) 2022 01:24:00 Tobias Hernandez [facility]

 

                POCT GLUCOSE (AUTOMATED) 2022 01:24:00 AlbTobias gupta [facility]

[facility]

 

                POCT GLUCOSE (AUTOMATED) 2022 00:18:00 AlbTobias gupta Uni

[facility]

 

                POCT GLUCOSE (AUTOMATED) 2022 00:18:00 AlbTobias gupta Uni

[facility]

 

                POCT GLUCOSE (AUTOMATED) 2022 22:56:00 AlbTobias gupta Uni

[facility]

 

                POCT GLUCOSE (AUTOMATED) 2022 22:56:00 AlbTobias gupta Bella

[facility]

 

                BASIC METABOLIC PANEL 2022 22:03:00 Tobias Hernandez Univer

[facility] [place]



                (NA, K, CL, CO2, GLUCOSE,                                 Medica

l Branch



                BUN, CREATININE, CA)                                 

 

                BASIC METABOLIC PANEL 2022 22:03:00 Tobias Hernandez Univer

[facility] [place]



                (NA, K, CL, CO2, GLUCOSE,                                 Medica

l Branch



                BUN, CREATININE, CA)                                 

 

                POCT GLUCOSE (AUTOMATED) 2022 21:50:00 AlbTobias gupta Uni

versity [facility]

 

                POCT GLUCOSE (AUTOMATED) 2022 21:50:00 AlbdamianamiTobias Uni

[facility]

 

                POCT GLUCOSE (AUTOMATED) 2022 20:39:00 AlbTobias gupta Uni

[facility]

 

                POCT GLUCOSE (AUTOMATED) 2022 20:39:00 Tobias Hernandez Uni

[facility]

 

                POCT GLUCOSE (AUTOMATED) 2022 18:58:00 AlbdamianamiTobias Uni

[facility]

 

                POCT GLUCOSE (AUTOMATED) 2022 18:58:00 AlbTobias gupta Uni

[facility]

 

                BASIC METABOLIC PANEL 2022 17:46:00 Tobias Hernandez Univer

[facility] [place]



                (NA, K, CL, CO2, GLUCOSE,                                 Medica

l Branch



                BUN, CREATININE, CA)                                 

 

                BASIC METABOLIC PANEL 2022 17:46:00 Tobias Hernandez Univer

[facility] [place]



                (NA, K, CL, CO2, GLUCOSE,                                 Medica

l Branch



                BUN, CREATININE, CA)                                 

 

                POCT GLUCOSE (AUTOMATED) 2022 17:39:00 AlbTobias gupta Uni

[facility]

 

                POCT GLUCOSE (AUTOMATED) 2022 17:39:00 AlbdamianamiTobias Uni

versity [facility]

 

                POCT GLUCOSE (AUTOMATED) 2022 16:22:00 AlbTobias gupta Uni

[facility]

 

                POCT GLUCOSE (AUTOMATED) 2022 16:22:00 Albustami, Tobias [facility]

 

                POCT GLUCOSE (AUTOMATED) 2022 15:27:00 Albcandy Tobias [facility]

 

                POCT GLUCOSE (AUTOMATED) 2022 15:27:00 Albcandy Tobias [facility]

 

                POCT GLUCOSE (AUTOMATED) 2022 14:17:00 Yong Tobias [facility]

 

                POCT GLUCOSE (AUTOMATED) 2022 14:17:00 Albcandy [facility]

 

                CBC WITHOUT DIFF 2022 13:33:00 Albcandy [facility]

 

                CBC WITHOUT DIFF 2022 13:33:00 Yong [facility]

 

                POCT GLUCOSE (AUTOMATED) 2022 13:20:00 Yong [facility]

 

                POCT GLUCOSE (AUTOMATED) 2022 13:20:00 Albcandy Tobias [facility]

 

                POCT GLUCOSE (AUTOMATED) 2022 12:13:00 Albcandy [facility]

 

                POCT GLUCOSE (AUTOMATED) 2022 12:13:00 Yong [facility]

 

                XR CHEST 1 VW   2022 11:46:06 Yong [facility]

 

                XR CHEST 1 VW   2022 11:46:06 Yong [facility]

 

                MAGNESIUM       2022 10:54:00 Yong [facility]

 

                MRSA / MSSA SCREEN BY 2022 10:54:00 Yong Tobias [facility]

[facility] [place]



                PCR, [facility]

 

                BASIC METABOLIC PANEL 2022 10:54:00 Yong Tobias [facility]

[facility] [place]



                (NA, K, CL, CO2, GLUCOSE,                                 Medica

l Branch



                BUN, CREATININE, CA)                                 

 

                OSMOLALITY, SERUM OR 2022 10:54:00 Yong Tobias [facility]



                PLASMA                                          [facility]

 

                PHOSPHORUS      2022 [facility]:54[facility]

 

                BETA HYDROXY-BUTYRATE 2022 10:54:00 [facility]

 

                PHOSPHORUS      2022 10:54:00 [facility]

 

                MAGNESIUM       2022 10:54:00 [facility]

 

                OSMOLALITY, SERUM OR 2022 10:54:00 [facility]



                PLASMA                                          [facility]

 

                BETA HYDROXY-BUTYRATE 2022 10:54:00 [facility]

 

                BASIC METABOLIC PANEL 2022 10:54:00 [facility]



                (NA, K, CL, CO2, GLUCOSE,                                 Medica

l Branch



                BUN, CREATININE, CA)                                 

 

                MRSA / MSSA SCREEN BY 2022 10:54:00 [facility] Tobias Univer

[facility] [place]



                PCR, [facility]

 

                POCT GLUCOSE (AUTOMATED) 2022 10:53:00 Tobias Hernandez [facility]

 

                POCT GLUCOSE (AUTOMATED) 2022 10:53:00 Tobias Hernandez

[facility]

 

                POCT GLUCOSE (AUTOMATED) 2022 08:46:00 Radha Fofana

[facility]

 

                POCT GLUCOSE (AUTOMATED) 2022 08:46:00 Radha Fofana

versity of [facility]

 

                POCT GLUCOSE (AUTOMATED) 2022 07:39:00 Radha Fofana

versity of [facility]

 

                POCT GLUCOSE (AUTOMATED) 2022 07:39:00 Radha Fofana

versity of [facility]

 

                POCT GLUCOSE (AUTOMATED) 2022 07:02:00 Radha Fofana

versity of [facility]

 

                POCT GLUCOSE (AUTOMATED) 2022 07:02:00 Radha Fofana

versity of [facility]

 

                POCT GLUCOSE (AUTOMATED) 2022 06:17:00 Radha Fofana

versity of [facility]

 

                POCT GLUCOSE (AUTOMATED) 2022 06:17:00 Radha Fofana [facility]

 

                AC PANEL 21 + LACTIC ACID 2022 05:36:00 Rad[facility]

 

                AC PANEL 21 + LACTIC ACID 2022 05:36:00 Radha Fofana [facility]

 

                POCT GLUCOSE (AUTOMATED) 2022 04:58:00 Radha Fofana [facility]

 

                POCT GLUCOSE (AUTOMATED) 2022 04:58:00 Radha Fofana [facility]

 

                CT ABDOMEN PELVIS W 2022 04:33:00 Radha Fofana [facility]



                CONTRAST                                        [facility]

 

                CT ABDOMEN PELVIS W 2022 04:33:00 Radha Fofana [facility]

 

                COMP. METABOLIC PANEL 2022 04:20:00 Radha Fofana [facility]



                ([id number])                                         [facility]

 

                COMP. METABOLIC PANEL 2022 04:20:00 Radha Fofana [facility]



                ([id number])                                         [facility]

 

                CBC WITH DIFF   2022 03:14:00 Radha Fofana [facility]

 

                BLOOD CULTURE SCREEN 2022 03:14:00 Radha Fofana [facility]

 

                BLOOD CULTURE SCREEN 2022 03:14:00 Radha Fofana [facility]

 

                CBC WITH DIFF   2022 03:14:00 Radha Fofana [facility]

 

                ACTIVATED PARTIAL 2022 03:13:00 Radha Fofana [facility]



                [facility]

 

                PROTHROMBIN TIME / INR 2022 03:13:00 Radha Fofana [facility]

 

                LIPASE          2022 03:13:00 Radha Fofana [facility]

 

                TROPONIN I      2022 03:13:00 Radha Fofana [facility]

 

                N-TERMINAL PRO-BNP 2022 03:13:00 Radha Fofana [facility]

 

                LIPASE          2022 03:13:00 Radha Fofana [facility]

 

                TROPONIN I      2022 03:13:00 Radha Fofana [place] [facility]

[facility]

 

                PROTHROMBIN TIME / INR 2022 03:13:00 Radha Fofana [facility]

 

                ACTIVATED PARTIAL 2022 03:13:00 Radha Fofana [facility]



                [facility]

 

                N-TERMINAL PRO-BNP 2022 03:13:00 Radha Fofana [facility]

 

                LACTIC ACID WHOLE BLOOD 2022 03:12:00 Radha Fofana [facility]

 

                LACTIC ACID WHOLE BLOOD 2022 03:12:00 Radha Fofana [facility]

 

                EKG-12 LEAD     2022 01:55:16 Doctor Unassigned, No Univer

sity of [facility]

 

                EKG-12 LEAD     2022 01:55:16 Doctor Unassigned, No Univer

sity of [facility]

 

                EMERGENCY DEPARTMENT 2022 05:01:00 [facility]igned, No [facility]

 

                HOSPITAL ADMISSION 2022 05:01:00 Doctor Unassigned, No Uni

versity of [facility]

 

                BASIC METABOLIC PANEL 2022 09:31:00 [facility]



                (NA, K, CL, CO2, GLUCOSE,                                 Medica

l Branch



                BUN, CREATININE, CA)                                 

 

                CBC WITH DIFF   2022 09:31:00 [facility]

 

                [facility]IC METABOL[facility] PANEL 2022 09:31:00 [facility]



                (NA, K, CL, CO2, GLUCOSE,                                 Medica

l Branch



                BUN, CREATININE, CA)                                 

 

                CBC WITH DIFF   2022 09:31:00 [facility]

 

                BASIC METABOLIC PANEL 2022 08:57:00 Moose Mosquera [facility]



                (NA, K, CL, CO2, GLUCOSE,                                 Medica

l Branch



                BUN, CREATININE, CA)                                 

 

                CBC WITH DIFF   2022 08:57:00 Moose Mosquera [facility]

 

                BASIC METABOLIC PANEL 2022 08:57:00 Moose Mosquera [facility]



                (NA, K, CL, CO2, GLUCOSE,                                 Medica

l Branch



                BUN, CREATININE, CA)                                 

 

                CBC WITH DIFF   2022 08:57:00 [facility]

 

                CBC WITH DIFF   2022 10:55:00 Lea reymundo  [facility]

 

                COMP. METABOLIC PANEL 2022 10:55:00 Lea reymundo  [facility]



                ([id number])                                         [facility]

 

                N-TERMINAL PRO-BNP 2022 10:55:00 Lea [facility]

 

                COMP. METABOLIC PANEL 2022 10:55:00 Lea reymundo  [facility]



                ([id number])                                         [facility]

 

                CBC WITH DIFF   2022 10:55:00 Lea [facility]

 

                N-TERMINAL PRO-BNP 2022 10:55:00 Lea [facility]

 

                CBC WITH DIFF   2022 11:33:00 Ashly Lees [facility]

 

                COMP. METABOLIC PANEL 2022 11:33:00 Lea [facility]



                ([id number])                                         [facility]

 

                N-TERMINAL PRO-BNP 2022 11:33:00 Lea [facility]

 

                MAGNESIUM       2022 11:33:00 Lea reymundo  [facility]

 

                MAGNESIUM       2022 11:33:00 Lea [facility]

 

                COMP. METABOLIC PANEL 2022 11:33:00 Lea [facility]



                ([id number])                                         [facility]

 

                CBC WITH DIFF   2022 11:33:00 Ashly Lees [facility]

 

                N-TERMINAL PRO-BNP 2022 11:33:00 Lea reymundo  [facility]

 

                ASPIRATE OR ABSCESS 2022 21:31:00 Coleen Cerna   [facility] [place]



                CULTURE(AEROBIC/ANAEROBIC                                 Medica

l Branch



                )                                               

 

                ASPIRATE OR ABSCESS 2022 21:31:00 Coleen Cerna   [facility] [place]



                CULTURE(AEROBIC/ANAEROBIC                                 Medica

l Branch



                )                                               

 

                PROTEIN CREAT RATIO URINE 2022 11:11:00 Rand Dong  [facility]

 

                SODIUM, URINE RANDOM 2022 11:11:00 Rand Dong  [facility]

 

                SODIUM, URINE RANDOM 2022 11:11:00 Rand Dong  [facility]

 

                PROTEIN CREAT RATIO URINE 2022 11:11:00 Rand Dong  [facility]

 

                XR CHEST 1 VW   2022 11:07:43 Rand Dong  [facility]

 

                XR FOOT 3+ VW LEFT 2022 11:07:43 Rand Dong  [facility]

 

                XR CHEST 1 VW   2022 11:07:43 Rand Dong  [facility]

 

                XR FOOT 3+ VW LEFT 2022 11:07:43 Rand Dong  [facility]

 

                URINE DRUG (IMMUNOASSAY) 2022 11:07:00 aRnd Dong

[facility]



                SCREEN                                          

 

                URINE DRUG (LCMSMS) - 2022 11:07:00 Rand Dong  [facility]



                OPIATES PANEL                                   Medical Branch

 

                URINE DRUG (IMMUNOASSAY) 2022 11:07:00 Rand Dong

[facility]



                SCREEN                                          

 

                URINE DRUG (LCMSMS) - 2022 11:07:00 Rand Dong  [facility]



                SYNTHETIC OPIATES PANEL                                 Medical 

Branch

 

                SEDIMENTATION RATE 2022 11:03:00 Rand Dong  [facility]

 

                PROCALCITONIN   2022 11:03:00 Lea reymundo  [facility]

 

                CBC WITH DIFF   2022 11:03:00 Lea reymundo  [facility]

 

                COMP. METABOLIC PANEL 2022 11:03:00 Lea reymundo  [facility]



                ([id number])                                         [facility]

 

                N-TERMINAL PRO-BNP 2022 11:03:00 Lea reymundo  [facility]

 

                MAGNESIUM       2022 11:03:00 Lea [facility]

 

                PHOSPHORUS      2022 11:03:00 Lea [facility]

 

                CREATINE KINASE 2022 11:03:00 Lea [facility]

 

                VITAMIN D, 25-OH 2022 11:03:00 Lea [facility]

 

                VITAMIN B12, LEVEL 2022 11:03:00 Lea [facility]

 

                GLYCOSYLATED HEMOGLOBIN 2022 11:03:00 Lea Adnan  [facility]

[facility] [place]



                ([address]

 

                PHOSPHORUS      2022 11:03:00 Lea [facility]

 

                CREATINE KINASE 2022 11:03:00 Lea [facility]

 

                MAGNESIUM       2022 11:03:00 Lea [facility]

 

                VITAMIN B12, LEVEL 2022 11:03:00 Lea [facility]

 

                COMP. METABOLIC PANEL 2022 11:03:00 Lea reymundo  [facility]



                ([id number])                                         [facility]

 

                SEDIMENTATION RATE 2022 11:03:00 Lea [facility]

 

                CBC WITH DIFF   2022 11:03:00 Lea [facility]

 

                GLYCOSYLATED HEMOGLOBIN 2022 11:03:00 Lea Adnan  [facility]

[facility] [place]



                ([facility])                                           [facility]

 

                N-TERMINAL PRO-BNP 2022 11:03:00 Lea [facility]

 

                VITAMIN D, 25-OH 2022 11:03:00 Lea [facility]

 

                PROCALCITONIN   2022 11:03:00 Rand Dong  [facility]

 

                CT ANGIOGRAM LOWER 2022 03:39:00 Radha Fofana [facility]



                EXTREMITY LEFT W CONTRAST                                 Medica

l Branch

 

                CT ANGIOGRAM LOWER 2022 03:39:00 Radha Fofana [facility]



                EXTREMITY LEFT W CONTRAST                                 Medica

l Branch

 

                CT HEAD WO CONTRAST 2022 03:25:00 Radha Fofana [facility]

 

                CT HEAD WO CONTRAST 2022 03:25:00 Radha Fofana [facility]

 

                URINALYSIS      2022 01:22:00 Brigido [facility]

 

                URINE CULTURE   2022 01:22:00 Radha Fofana [facility]

 

                URINALYSIS      2022 01:22:00 Radha Fofana [facility]

 

                URINE CULTURE   2022 01:22:00 Radha Fofana [facility]

 

                CBC WITH DIFF   2022 00:58:00 Radha Fofana [facility]

 

                ACTIVATED PARTIAL 2022 00:58:00 Radha Fofana [facility]



                [facility]

 

                PROTHROMBIN TIME / INR 2022 00:58:00 Radha Fofana [facility]

 

                COMP. METABOLIC PANEL 2022 00:58:00 Radha Fofana [facility]



                ([id number])                                         [facility]

 

                BLOOD CULTURE SCREEN 2022 00:58:00 Radha Fofana [facility]

 

                LACTIC ACID WHOLE BLOOD 2022 00:58:00 Radha Fofana [facility]

 

                N-TERMINAL PRO-BNP 2022 00:58:00 Rand Dong  [facility]

 

                MAGNESIUM       2022 00:58:00 Lea reymundo  [facility]

 

                PHOSPHORUS      2022 00:58:00 Lea [facility]

 

                FERRITIN SERUM  2022 00:58:00 Lea [facility]

 

                IRON PANEL      2022 00:58:00 Lea, Adnan  [facility]

 

                THYROID STIMULATING 2022 00:58:00 Lea [facility]



                HORMONE                                         [facility]

 

                LIPID PANEL ([id number])(TOTAL 2022 00:58:00 Rand Dong  [facility]



                CHOLESTEROL,                                    Medical Branch



                TRIGLYCERIDES, HDL)                                 

 

                BLOOD CULTURE SCREEN 2022 00:58:00 Radha Fofana [facility]

 

                PHOSPHORUS      2022 00:58:00 Rand Dong  [facility]

 

                MAGNESIUM       2022 00:58:00 Lea [facility]

 

                FERRITIN SERUM  2022 00:58:00 Lea [facility]

 

                THYROID STIMULATING 2022 00:58:00 Lea reymundo  [facility]



                HORMONE                                         [facility]

 

                COMP. METABOLIC PANEL 2022 00:58:00 Radha Fofana [facility]



                ([id number])                                         Medical Branch

 

                LIPID PANEL ([id number])(TOTAL 2022 00:58:00 Rand Dong

[facility]



                CHOLESTEROL,                                    Medical Branch



                TRIGLYCERIDES, HDL)                                 

 

                IRON PANEL      2022 00:58:00 Rand Dong  [facility]

 

                CBC WITH DIFF   2022 00:58:00 Radha Fofana [facility]

 

                PROTHROMBIN TIME / INR 2022 00:58:00 Radha Fofana [facility]

 

                ACTIVATED PARTIAL 2022 00:58:00 Radha Fofana [facility]



                [facility]

 

                N-TERMINAL PRO-BNP 2022 00:58:00 Rand Dong  [facility]

 

                LACTIC ACID WHOLE BLOOD 2022 00:58:00 Radha Fofana [facility]

 

                EMERGENCY DEPARTMENT 2022-05-10 05:01:00 Doctor Unassigned, No [facility]

 

                HOSPITAL ADM - MISC 2022-05-10 05:01:00 [facility]

 

                HOSPITAL ADMISSION 2022-05-10 05:01:00 Doctor Unassigned, No Uni

[facility]ity of [facility]

 

                EMERGENCY DEPARTMENT 2022-05-10 05:01:00 Doctor Unassigned, No U

niversity of 

[facility]

 

                HOSPITAL ADM - MISC 2022-05-10 05:01:00 Doctor Unassigned, No Un

iversity of 

[facility]

 

                Spinal puncture, lumbar, 2022 20:30:00                 Patience Garcia



                diagnostic; with                                 



                fluoroscopic or CT                                 



                guidance                                        

 

                MAGNESIUM       2022 [facility]12[facility]

 

                COMP. METABOLIC PANEL 2022 04:12:00 [facility]



                ([id number])                                         [facility]

 

                CBC WITH DIFF   2022 04:12:00 [facility]

 

                CT HEAD WO CONTRAST 2022 00:15:29 ALLISON Romeo [facility]

 

                URINALYSIS      2022 23:53:00 ALLISON Romeo [facility]

 

                COMP. METABOLIC PANEL 2022 23:52:00 ALLISON Romeo Elena [facility]

[facility] [place]



                ([id number])                                         [facility]

 

                CBC WITH DIFF   2022 23:52:00 ALLISON Romeo [facility]

 

                XR CHEST 1 VW   2022 03:03:32 Rand Dong  [facility]

 

                COMP. METABOLIC PANEL 2022 11:57:00 [facility]



                ([id number])                                         [facility]

 

                CBC WITH DIFF   2022 11:57:00 [facility]

 

                BASIC METABOLIC PANEL 2022 12:10:00 [facility]



                (NA, K, CL, CO2, GLUCOSE,                                 Medica

l Branch



                BUN, CREATININE, CA)                                 

 

                CBC WITH DIFF   2022 12:09:00 [facility] [place] [facility]

 

                URINALYSIS      2022 00:40:00 Suly Luna [facility]

 

                URINE CULTURE   2022 00:40:00 Suly Luna [facility]

 

                FECES CULTURE   2022 14:58:00 Marcelle [facility]

 

                OCCULT (GUAIAC) BLOOD 2022 14:58:00 Marcelle [facility]

 

                CLOSTRIDIUM DIFFICILE 2022 14:58:00 Marcelle [facility]

 

                FECAL PATHOGENS BY PCR 2022 14:58:00 [facility]

 

                URINE DRUG (IMMUNOASSAY) 2022 10:11:00 Marcelle [facility]



                SCREEN                                          

 

                COVID-19 (ID NOW RAPID 2022 07:33:00 Silvino Garay [facility]



                TESTING)                                        Medical [place]

 

                LAB ONLY COVID  2022 07:33:00 Silvino Garay [facility]



                INTERPRETATION                                  [facility]

 

                COMP. METABOLIC PANEL 2022 06:18:00 Silvino Garay [facility]



                ([id number])                                         [facility]

 

                CBC WITH DIFF   2022 05:40:00 Silvino Garay [facility]

 

                URINALYSIS      2022 01:43:00 Alma Villaseñor [facility]

 

                LIPASE          2022 01:40:00 Alma Villaseñor [facility]

 

                COMP. METABOLIC PANEL 2022 01:40:00 Alma Villaseñor [facility]



                ([id number])                                         [facility]

 

                CBC WITH DIFF   2022 01:38:00 Alma Villaseñor [facility]

 

                XR CHEST 1 VW   2022 23:40:19 Alma Villaseñor [facility]

 

                ASSIGNMENT OF BENEFITS 2022 22:05:40 Doctor Unassigned, No

 [facility]

 

                NOTICE OF PRIVACY 2022 22:04:58 Doctor Unassigned, No [facility]

 

                CONSENT/REFUSAL FOR 2022 22:04:33 Doctor Unassigned, No [facility]



                DIAGNOSIS AND TREATMENT                 Name            Medical 

Branch

 

                URINALYSIS      2022-01-15 23:09:00 Neeta Maria [facility]

 

                BLOOD CULTURE SCREEN 2022-01-15 23:04:00 Neeta Maria [facility]

 

                D-DIMER         2022-01-15 22:49:00 Neeta Maria [facility]

 

                COVID-19 (ID NOW RAPID 2022-01-15 22:48:00 Neeta Maria [facility]

[facility] [place]



                TESTING)                                        Medical Branch

 

                COMP. METABOLIC PANEL 2022-01-15 22:17:00 Neeta Maria [facility]

[facility] [place]



                ([id number])                                         [facility]

 

                CBC WITH DIFF   2022-01-15 22:17:00 Neeta Maria [facility]

 

                XR CHEST 1 VW   2022-01-15 21:47:19 Neeta Maria [facility]

 

                COMP. METABOLIC PANEL 2021 22:20:00 Varinder Sol [facility]

[facility] [place]



                ([id number])                                         [facility]

 

                CBC WITH DIFF   2021 22:20:00 Singer, Varinder [facility] o

[facility]

 

                CT ABDOMEN PELVIS WO 2021-05-10 00:39:40 Bossman Villa Uni

[facility] [place]



                CONTRAST                                        [facility]

 

                LIPASE          2021-05-10 00:06:00 Bossman Villa [facility]

 

                COMP. METABOLIC PANEL 2021-05-10 00:06:00 Bossman Villa Un

[facility]



                ([id number])                                         [facility]

 

                CBC WITH DIFF   2021-05-10 00:06:00 IbGabo cantuo F [facility]

 

                URINALYSIS      2021-05-10 00:00:00 Bossman Villa [facility]

 

                COVID-19 (ID NOW RAPID 2021-05-10 00:00:00 Bossman Villa F U

[facility]



                TESTING)                                        Medical Branch

 

                Cholecystectomy                                 [facility]

 

                Shunt of cerebral                                 [facility]

nn



                ventricle to extracranial                                 



                site                                            







Plan of Care







             Planned Activity Planned Date Details      Comments     Source

 

             Future Scheduled 2023-01-10   Lipid panel               [facility]

s



             Test         00:00:00     (procedure) [code =              [facility]



                                       81312392]                 

 

             Future Scheduled 2023-01-10   Lipid panel               [facility]

s



             Test         00:00:00     (procedure) [code =              [facility] 

Center



                                       [id number]]                 

 

             Future Scheduled 2023-01-10   Lipid panel               [facility]

s



             Test         00:00:00     (procedure) [code =              [facility] 

Center



                                       [id number]]                 

 

             Future Scheduled 2023-01-10   Lipid panel               [facility]

s



             Test         00:00:00     (procedure) [code =              [facility] 

Center



                                       [id number]]                 

 

             Future Scheduled 2022   INFLUENZA VACCINE (#1)              C

[facility]



             Test         00:00:00     [code = INFLUENZA              Medical Ce

nter



                                       VACCINE (#1)]              

 

             Future Scheduled 2022   INFLUENZA VACCINE (#1)              C

[facility]



             Test         00:00:00     [code = INFLUENZA              Medical Ce

nter



                                       VACCINE (#1)]              

 

             Future Scheduled 2022   INFLUENZA VACCINE (#1)              C

[facility]



             Test         00:00:00     [code = INFLUENZA              Medical Ce

nter



                                       VACCINE (#1)]              

 

             Future Scheduled 2022   DEPRESSION SCREENING              [facility]



             Test         00:00:00     (12+) [code =              Medical Center



                                       DEPRESSION SCREENING              



                                       (12+)]                    

 

             Future Scheduled 2022   DEPRESSION SCREENING              [facility]



             Test         00:00:00     (12+) [code =              Medical Center



                                       DEPRESSION SCREENING              



                                       (12+)]                    

 

             Future Scheduled 2022   DEPRESSION SCREENING              [facility]



             Test         00:00:00     (12+) [code =              Medical Center



                                       DEPRESSION SCREENING              



                                       (12+)]                    

 

             Future Scheduled 2021   INFLUENZA VACCINE              [facility]



             Test         00:00:00     (Season Ended) [code =              [facility]



                                       INFLUENZA VACCINE              



                                       (Season Ended)]              

 

             Future Scheduled 2021   DEPRESSION SCREENING              [facility]



             Test         00:00:00     (12+) [code =              Medical Center



                                       DEPRESSION SCREENING              



                                       (12+)]                    

 

             Future Scheduled 2020-04-10   Hemoglobin A1c              [facility]

kes



             Test         00:00:00     measurement               Medical Center



                                       (procedure) [code =              



                                       13093274]                 

 

             Future Scheduled 2020-04-10   Hemoglobin A1c              [facility]

kes



             Test         00:00:00     measurement               Medical Center



                                       (procedure) [code =              



                                       [id number]]                 

 

             Future Scheduled 2020-04-10   Hemoglobin A1c              [facility]

kes



             Test         00:00:00     measurement               Medical Center



                                       (procedure) [code =              



                                       [id number]]                 

 

             Future Scheduled 2020-04-10   Hemoglobin A1c              [facility]

kes



             Test         00:00:00     [facility]               Medical Center



                                       (procedure) [code =              



                                       [id number]]                 

 

             Future Scheduled 2004   DTAP/TDAP/TD VACCINES              [facility]

[facility]



             Test         00:00:00     (1 - Tdap) [code =              Medical C

enter



                                       DTAP/TDAP/TD VACCINES              



                                       (1 - Tdap)]               

 

             Future Scheduled 2004   DTAP/TDAP/TD VACCINES              [facility]

[facility]



             Test         00:00:00     (1 - Tdap) [code =              Medical C

enter



                                       DTAP/TDAP/TD VACCINES              



                                       (1 - Tdap)]               

 

             Future Scheduled 2004   DTAP/TDAP/TD VACCINES              [facility]

[facility]



             Test         00:00:00     (1 - Tdap) [code =              Medical C

enter



                                       DTAP/TDAP/TD VACCINES              



                                       (1 - Tdap)]               

 

             Future Scheduled 2004   DTAP/TDAP/TD VACCINES              [facility]



             Test         00:00:00     (1 - Tdap) [code =              Medical C

enter



                                       DTAP/TDAP/TD VACCINES              



                                       (1 - Tdap)]               

 

             Future Scheduled 2003   HEPATITIS C SCREENING              [facility]



             Test         00:00:00     [code = HEPATITIS C              Medical 

Center



                                       SCREENING]                

 

             Future Scheduled 2003   HEPATITIS C SCREENING              [facility]

[facility]



             Test         00:00:00     [code = HEPATITIS C              Medical 

Center



                                       SCREENING]                

 

             Future Scheduled 2003   HEPATITIS C SCREENING              [facility]

[facility]



             Test         00:00:00     [code = HEPATITIS C              Medical 

Center



                                       SCREENING]                

 

             Future Scheduled 2003   HEPATITIS C SCREENING              [facility]



             Test         00:00:00     [code = HEPATITIS C              Medical 

Center



                                       SCREENING]                

 

             Future Scheduled 1997   COVID-19 VACCINE (1)              [facility]



             Test         00:00:00     [code = COVID-19              Medical Abilio

ter



                                       VACCINE (1)]              

 

             Future Scheduled 1997   Tobacco Cessation              [facility]



             Test         00:00:00     Counseling and              Medical Cente

r



                                       Screening (12+) [code              



                                       = Tobacco Cessation              



                                       Counseling and              



                                       Screening (12+)]              

 

             Future Scheduled 1997   Tobacco Cessation              [facility]



             [facility]         00:00:00     Counseling and              Medical Cente

r



                                       Screening (12+) [code              



                                       = Tobacco Cessation              



                                       Counseling and              



                                       Screening (12+)]              

 

             Future Scheduled 1995   DIABETIC EYE EXAM              [facility]



             [facility]         00:00:00     [code = DIABETIC EYE              Medical

 Center



                                       EXAM]                     

 

             Future Scheduled 1995   Urine screening for              [facility]



             Test         00:00:00     protein (procedure)              Medical 

Center



                                       [code = 736441447]              

 

             Future Scheduled 1995   DIABETIC EYE EXAM              [facility]



             Test         00:00:00     [code = DIABETIC EYE              Medical

 Center



                                       EXAM]                     

 

             Future Scheduled 1995   Urine screening for              [facility]



             Test         00:00:00     protein (procedure)              Medical 

Center



                                       [code = 664325615]              

 

             Future Scheduled 1995   DIABETIC EYE EXAM              [facility]



             [facility]         00:00:00     [code = DIABETIC EYE              Medical

 Center



                                       EXAM]                     

 

             Future Scheduled 1995   Urine screening for              [facility]



             Test         00:00:00     protein (procedure)              Medical 

Center



                                       [code = 708358943]              

 

             Future Scheduled 1995   DIABETIC EYE EXAM              [facility]



             Test         00:00:00     [code = DIABETIC EYE              Medical

 Center



                                       EXAM]                     

 

             Future Scheduled 1995   Urine screening for              [facility]



             Test         00:00:00     protein (procedure)              [facility] 

Center



                                       [code = 352083590]              

 

             Future Scheduled 1991   PNEUMOCOCCAL VACCINE              [facility]



             Test         00:00:00     0-64 YRS (1 of 1 -              Medical C

enter



                                       PPSV23) [code =              



                                       PNEUMOCOCCAL VACCINE              



                                       0-64 YRS (1 of 1 -              



                                       PPSV23)]                  

 

             Future Scheduled 1991   PNEUMOCOCCAL VACCINE              [facility]



             Test         00:00:00     0-64 YRS (1 - PCV)              Medical C

enter



                                       [code = PNEUMOCOCCAL              



                                       VACCINE 0-64 YRS (1 -              



                                       PCV)]                     

 

             Future Scheduled 1991   PNEUMOCOCCAL VACCINE              [facility]



             Test         00:00:00     0-64 YRS (1 - PCV)              Medical C

enter



                                       [code = PNEUMOCOCCAL              



                                       VACCINE 0-64 YRS (1 -              



                                       PCV)]                     

 

             Future Scheduled 1991   PNEUMOCOCCAL VACCINE              [facility]



             Test         00:00:00     0-64 YRS (1 - PCV)              Medical C

enter



                                       [code = PNEUMOCOCCAL              



                                       VACCINE 0-64 YRS (1 -              



                                       PCV)]                     

 

             Future Scheduled 1986   COVID-19 VACCINE (#1)              [facility]



             Test         00:00:00     [code = COVID-19              Medical Abilio

ter



                                       VACCINE (#1)]              

 

             Future Scheduled 1986   COVID-19 VACCINE (#1)              [facility]



             Test         00:00:00     [code = COVID-19              Medical Abilio

ter



                                       VACCINE (#1)]              

 

             Future Scheduled 1986   COVID-19 VACCINE (#1)              [facility]



             Test         00:00:00     [code = COVID-19              Medical Abilio

ter



                                       VACCINE (#1)]              







Encounters







        Start   End     Encounter Admission Attending Care    Care    Encounter 

Source



        Date/Time Date/Time Type    Type    Clinicians Facility Department ID   

   

 

        2023         Outpatient         Jesusita,  STLMLC  [facility]  [id number]

 Common



        10:47:00                         Domingo                  [id number]   [facility]

 

        2023         Outpatient                 [facility]     -[id number]

 [place]



        15:02:41                                                 0227    [facility]

 

        2023         Outpatient         Jesusita,  STLMLC  [facility]  [id number]

 Common



        15:24:00                         Domingo                  [id number]   [facility]

 

        2022         Outpatient                 [facility]     -[id number]

 [place]



        10:51:49                                                 1212    [facility]

 

        2022         Outpatient                 [facility]     -1467

 [place]



        08:22:27                                                 0411    [facility]

 

        2022         Outpatient                 [facility]     -[id number]

 [place]



        11:10:01                                                 0328    [facility]

 

        2022         Outpatient         Jesusita,  STLMLC  [facility]  [id number]

 Common



        13:45:16                         Domingo                  [id number]   [facility]

 

        2022         Outpatient         Jesusita,  STLMLC  [facility]  [id number]

 Common



        13:17:39                         Domingo                  [id number]   [facility]

 

        2022         Outpatient         Jesusita,  STLMLC  STLMLC  [id number]

 Common



        13:16:34                         Domingo                  [id number]   [facility]

 

        2023 Outpatient R       LINO [facility]    [id number]

[id number] Univers



        14:00:00 14:00:00                 [facility]

 

        2023 Outpatient R               [facility]    [id number]

[id number] Univers



        14:00:00 14:00:00                                                 [facility]

 

        2023 Alvaro Phillips  [facility]    1..[id number] 7080

[id number] Univers



        00:00:00 00:00:00                 Anette A HEALTH  350.1.13.10         

ity of



                                                [facility] [id number]         Eric

as



                                                HÉCTOR?BLEA [phone]         [address]                 

 

        2023-05-10 2023-05-10 Outpatient R       [facility]    [id number]

[id number] Univers



        13:30:00 13:30:00                 SUREKHA                           ity [facility]

 

        2023 Emergency X       [facility]    ERT     [id number]

[id number] Univers



        14:23:00 19:32:00                 , KATIA                         [facility]

 

        2023 Emergency         [facility]    1..840.114 

[id number] Univers



        14:23:00 19:32:00                 , Katia [place] 350.1.13.10         i

ty of



                                                [facility] [id number]         [place]

[facility]  [phone]         [address]

 

        2023 Outpatient R               [facility]    [id number]

072 Univers



        13:00:00 13:00:00                                                 [facility]

 

        2023 Telephone         Mary Anne Ramirez [facility]    1..[id number] [id number]

[id number] Univers



        00:00:00 00:00:00                         HEALTH  350.1.13.10         it

y of



                                                [place] 4.2.7.2.686         Eric

as



                                                HÉCTOR?BLEA [phone]         Me

stella



                                                [address]                 



                                                OFFICE                  



                                                [facility]                 

 

        2023 [facility] R       [facility]    [id number]

3200 Univers



        14:00:00 14:00:00                 [facility]

 

        2023 Telephone         Mary Anne Ramirez [facility]    1..453.040 3078

90576 Univers



        00:00:00 00:00:00                         HEALTH  350.1.13.10         it

y of



                                                [place] 4.2.7.2.686         Eric

as



                                                HÉCTOR?BLEA [phone]         [place] [address]             [facility]                 



                                                OFFICE                  



                                                [facility]                 

 

        2023 Orders          Doctor  EDEN    1.2.840.114 [id number]

431 Univers



        00:00:00 00:00:00 Only            Unassigned, STEPHANIE   350.1.13.10       

  ity of



                                        [facility] [id number]         Eric

as



                                                        [phone]         [address]

 

        2023 Telephone         [facility]    [id number] [id number]

[id number] Univers



        00:00:00 00:00:00                 Anette A HEALTH  350.1.13.10         

ity of



                                                [facility] [id number]         Eric

as



                                                HÉCTOR?BLEA [phone]         [address]                 

 

        2023 Outpatient R       MARY ANNE RAMIREZ [facility]    [id number]

160 Univers



        16:30:00 16:30:00                 MARY ANNE RAMIREZ                         [facility]

 

        2023 Telephone         Ashley [facility]    [id number] [id number]

[id number] Univers



        00:00:00 00:00:00                         HEALTH  350.1.13.10         it

y of



                                                [facility] 4.2.7.2.686         Eric

as



                                                HÉCTOR?BLEA [phone]         [address]                 

 

        2023 Telephone         Ashley [facility]    1.2.457.548 [id number]

[id number] Univers



        00:00:00 00:00:00                         HEALTH  350.1.13.10         it

y of



                                                [facility] [id number]         Eric

as



                                                HÉCTOR?BLEA [phone]         [address]                 



                                                OFFICE                  



                                                [facility]                 

 

        2023 [facility] X       [facility]     [id number]

[id number] Univers



        14:34:00 20:52:00                 RADHA melton [facility]

 

        2023 Emergency         [facility]    [id number]

[id number] Univers



        14:34:00 20:52:00                 Radha MCCARTHY 350.1.13.10         i

ty of



                                                [place] 4.2.7.2.686         [place]

[place]



                                                [place]  [phone]         Medi

sarah



                                                        [address]             [place]

 

        2023 Outpatient R       ASHLEYMARY ANNE MALLOY [facility]    [id number]

308 Univers



        13:00:00 14:32:40                 MARY ANNE RAMIREZ                         [facility]

 

        2023 Office          Mary Anne Ramirez [facility]    1.2.840.114 [id number]

46 Univers



        13:00:00 14:32:40 Visit                   HEALTH  350.1.13.10         it

y of



                                                [place] 4.2.7.2.686         Eric

as



                                                HÉCTOR?BLEA [phone]         [facility]    220             [facility]                 



                                                OFFICE                  



                                                [facility]                 

 

        2023 Telephone         Ashley, [facility]    [id number]

[id number] Univers



        00:00:00 00:00:00                         HEALTH  350.1.13.10         it

y of



                                                [place] 4.2.7.2.686         Eric

as



                                                HÉCTOR?BLEA [phone]         [facility]    220             [facility]                 



                                                OFFICE                  



                                                [facility]                 

 

        2023 Telephone         [facility]    1.2.840.114 [id number]

[id number] Univers



        00:00:00 00:00:00                 Surekha   [place] 350.1.13.10         i

ty of



                                                [facility] 4.2.7.2.686         [place]

[place]



                                                PROFESSIO [phone]         [facility]     204             [facility]                 

 

        2023 [facility]patient R       [facility]    [id number]

2965 Univers



        14:00:00 14:45:52                 [facility]

 

        2023 Telephone         [facility]    1.2.840.114 [id number]

[id number] Univers



        00:00:00 00:00:00                 Surekha   [facility] 350.1.13.10         i

ty of



                                                [facility] 4.2.7.2.686         [place]

[place]



                                                PROFESSIO [phone]         [facility]     188             [facility]                 

 

        2023 Emergency X       [facility]    ERT     [id number]

33 Univers



        16:59:00 23:22:00                 NEETA melton [facility]

 

        2023 Emergency         [facility]    1.2.037.059 [id number]

[id number] Univers



        16:59:00 23:22:00                 Neeta MCCARTHY 350.1.13.10         

ity of



                                                JAY JAYANIBAL 4.2.7.2.686         [place]

[place]



                                                [place]  [phone]         [address]

 

        2023 Outpatient R       LINO [facility]    [id number]

2020 Univers



        13:00:00 13:49:32                 SUREKHA                           ity [facility]

 

        2023 Nurse           Nurse, [facility]    1.2.

840.114 [id number] 

Univers



        13:00:00 13:49:32 Visit           Surekha Huynh 350.1.13.10   

      ity of



                                                [facility] 4.2.7.2.686         [place]

[place]



                                                [facility] [phone]         Me

dical



                                                [facility]     204             [address]



                                                BUILDING                 

 

        2023 Telephone         Mary Anne Ramirez [facility]    [id number] [id number]

[id number] Univers



        00:00:00 00:00:00                         HEALTH  350.1.13.10         it

y of



                                                [facility] 4.2.7.2.686         Eric

as



                                                HÉCTOR?BLEA [phone]         Me

dical



                                                [facility]    [address]             [place]



                                                MEDICAL                 



                                                OFFICE                  



                                                BUILDING                 

 

        2023 Emergency X       [facility]    ERT     [id number]

[id number] Univers



        15:37:00 21:02:00                 BOSSMAN                         ity [facility]

 

        2023 Emergency         [facility]    1.2.840.114 [id number]

[id number] Univers



        15:37:00 21:02:00                 Bossman MCCARTHY 350.1.13.10        

 ity of



                                                [place] 4.2.7.2.686         [place]

[place]



                                                [place]  [phone]         [address]

 

        2023 Outpatient R       MARY ANNE RAMIREZ [facility]    [id number]

992 Univers



        14:30:00 14:30:00                 MARY ANNE RAMIREZ [facility]

 

        2023 Outpatient R       LINO [facility]    [id number]

5800 Univers



        13:30:00 13:30:00                 SUREKHA                           ity of



                                                                        [facility]

 

        2023 Transition         IMTIAZ Guzman  1.2.840.114 [id number]

[id number] Univers



        00:00:00 00:00:00 of Care         Dulce DUNN   350.1.13.10        

 ity of



                                                [place]   4.2.7.2.686         [place]

[place]



                                                        [phone]         [address]

 

        2023 Inpatient X       DONALD FOX [facility]  

   [id number] Univers



        18:47:00 14:22:00                 DONALD FOX [facility]

 

        2023 [employer]         Yoni Mariscal [facility]    1.2.8

40.114 [id number] 

Univers



        18:47:00 14:22:00 Encounter         Noe Phillips [facility]  350.1.13.10    

     ity of



                                        Donald Fox  4.2.7.2.686  

       [place]



                                                CITY    [phone]         [address]                 



                                                ([facility])                   

 

        2023 Patient         IMTIAZ Allan  1.2.840.114 [id number]

[id number] Univers



        00:00:00 00:00:00 Outreach         Marcela DUNN   350.1.13.10         i

ty of



                                                [place]   4.2.7.2.686         [place]

[place]



                                                        [phone]         [address]

 

        2023 Surgery         Derrek [facility]    1.2.840.114 [id number]

198 Univers



        13:29:00 15:37:00                 Vikash SPECIALTY 350.1.13.10         

ity of



                                                CARE    4.2.7.2.686         [place]

[place]



                                                CENTER .[phone]         Me

shane[address]                   

 

        2023 Telephone         Mary Anne Ramirez [facility]    1.2.710.570 [id number]

[id number] Univers



        00:00:00 00:00:00                         HEALTH  350.1.13.10         it

y of



                                                [place] 4.2.7.2.686         Eric

as



                                                HÉCTOR?BLEA [phone]         [address]                 



                                                OFFICE                  



                                                [facility]                 

 

        2023 Patient         IMTIAZ Salcedo  1.2.840.114 [id number]

278 Univers



        00:00:00 00:00:00 Outreach         Alondra DUNN   350.1.13.10         

ity of



                                                [place]   4.2.7.2.686         [place]

[place]



                                                        [phone]         [address]

 

        2023-02-15 2023-02-15 Telephone         Mary Anne Ramirez [facility]    1.2.783.429 [id number]

[id number] Univers



        00:00:00 00:00:00                         HEALTH  350.1.13.10         it

y of



                                                [place] 4.2.7.2.686         Eric

as



                                                HÉCTOR?BLEA [phone]         [address]                 

 

        2023 Patient         IMTIAZ Salcedo  1.2.840.114 [id number]

931 Univers



        00:00:00 00:00:00 Outreach         Alondra DUNN   350.1.13.10         

ity of



                                                [place]   4.2.7.2.686         [place]

[place]



                                                        [phone]         [address]

 

        2023 Alina Hugo [facility]    1.2.840.114 [id number]

[id number] Univers



        00:00:00 00:00:00                 [facility]  350.1.13.10       

  ity of



                                                [facility] 4.2.7.2.686         Eric

as



                                                HÉCTOR?BLEA [phone]         [address]                 



                                                OFFICE                  



                                                [facility]                 

 

        2023 Emergency X       SINGER, [facility]    ERT     [id number]

48 Univers



        13:47:00 18:16:00                 VARINDER melton [facility]

 

        2023 Emergency         [facility]    1.2.780.847 [id number]

[id number] Univers



        13:47:00 18:16:00                 Varinder SHARI 350.1.13.10         i

ty of



                                                [place] 4.2.7.2.686         [place]



                                                CAMPUS  [phone]         Medi

sarah



                                                        [address]             [place]

 

        2023 Telephone         Mary Anne Ramirez [facility]    1.2.695.447 [id number]

[id number] Univers



        00:00:00 00:00:00                         HEALTH  350.1.13.10         it

y of



                                                [place] 4.2.7.2.686         Eric

as



                                                HÉCTOR?BLEA [phone]         Me

stella



                                                [address]                 



                                                OFFICE                  



                                                [facility]                 

 

        2023-02-10 2023 Emergency X       [facility]     [id number]

1672 Univers



        16:13:00 00:01:00                 GABOO                         ity [facility]

 

        2023-02-10 2023 Emergency         [facility]    1.2.840.114 [id number]

[id number] Univers



        16:13:00 00:01:00                 Follavell TRENT [place] 350.1.13.10        

 ity of



                                                [place] 4.2.7.2.686         [place]

[place]



                                                CAMPUS  [phone]         Medi

sarah



                                                        [address]             [place]

 

        2023-02-10 2023-02-10 Patient         Roya Sotelo  1.2.840.114 [id number]

[id number] Univers



        00:00:00 00:00:00 Outreach         E       DUNN   350.1.13.10         i

ty of



                                                [place]   4.2.7.2.686         [place]

[place]



                                                        [phone]         Medi

sarah



                                                        [address]             [place]

 

        2023-02-10 2023-02-10 Telephone         Mary Anne Ramirez [facility]    1.2.461.267 [id number]

[id number] Univers



        00:00:00 00:00:00                         HEALTH  350.1.13.10         it

y of



                                                [place] 4.2.7.2.686         Eric

as



                                                HÉCTOR?BLEA [phone]         Me

stella



                                                KERWIN    [address] [place] [address]                 



                                                OFFICE                  



                                                [facility]                 

 

        2023 [facility] X       [facility]    ERT     [id number]

[id number] Univers



        15:06:00 19:05:00                 INOCENCIA melton [facility]

 

        2023 Emergency         [facility]    1.2.840.114 10

[id number] Univers



        15:06:00 19:05:00                 Inocencia MCCARTHY 350.1.13.10         

ity of



                                                [place] 4.2.7.2.686         [place]

[place]



                                                CAMPUS  [phone]         [address]

 

        2023 Outpatient X       LEA, [facility]     [id number]

[id number] Univers



        14:05:00 19:22:00                 ADNAN                           ity of



                                                                        [facility]

 

        2023 Emergency         Radha Fofana [facility]    1.2.840.

114 [id number] 

Univers



        14:05:00 19:22:00                 Rand Dong 350.1.13.10   

      ity of



                                                [facility] 4.2.7.2.686         [place]

[place]



                                                CAMPUS  [phone]         Medi

sarah



                                                        [address]             [place]

 

        2023 Patient         Roya Sotelo  1.2.840.114 10

[id number] Univers



        00:00:00 00:00:00 Outreach         E       DUNN   350.1.13.10         i

ty of



                                                [place]   4.2.7.2.686         [place]

[place]



                                                        [phone]         Medi

sarah



                                                        403             [place]

 

        2023 Patient         Mary Anne Ramirez [facility]    1.2.840.114 [id number]

70 Univers



        00:00:00 00:00:00 Secure [facility]  350.1.13.10        

 ity of



                                                [place] 4.2.7.2.686         Eric

as



                                                HÉCTOR?BLEA [phone]         Me

dical



                                                JOSEPHEY    [address]             [place]



                                                MEDICAL                 



                                                OFFICE                  



                                                BUILDING                 

 

        2022 Outpatient R       LINO [facility]    [id number]

[id number] Univers



        13:00:00 15:17:50                 SUREKHA                           ity of



                                                                        [facility]

 

        2022 Office          [facility]    1.2.840.114 [id number]

428 Univers



        13:00:00 15:17:50 Visit           Surekha   SHARI 350.1.13.10         i

ty of



                                                [facility] 4.2.7.2.686         [place]

[place]



                                                PROFESSIO [phone]         Me

dicandrew AKERS     204             [place]



                                                BUILDING                 

 

        2022 Orders          Doctor  EDEN    1.2.840.114 [id number]

85 Univers



        00:00:00 00:00:00 Only            Unassigned, STEPHANIE   350.1.13.10       

  ity of



                                        [facility] 4.2.7.2.686         Eric

as



                                                        [phone]         Medi

sarah



                                                        009             [place]

 

        2022 Outpatient R       MARY ANNE RAMIREZ [facility]    [id number]

401 Univers



        13:00:00 13:39:07                 MARY ANNE RAMIREZ [facility]

 

        2022 Office          Mary Anne Ramirez [facility]    1.2.840.114 [id number]

19 Univers



        13:00:00 13:39:07 Visit                   HEALTH  350.1.13.10         it

y of



                                                [place] 4.2.7.2.686         Eric

as



                                                HÉCTOR?BLEA [phone]         [facility]    220             [facility]                 



                                                OFFICE                  



                                                [facility]                 

 

        2022 Technician         Lab, Ang - Db [facility]    1.2.840.1

14 [id number] 

Univers



        12:45:00 13:00:00 Visit           Mary Anne Ramirez [facility]  350.1.13.10         it

y of



                                                [place] 4.2.7.2.686         Eric

as



                                                HÉCOTR?BLEA [phone]         [facility]    353             [facility]                 



                                                OFFICE                  



                                                BUILDING                 

 

        2022 Outpatient X       JOHN [facility]    KRISH     [id number]

754 Univers



        16:26:00 17:36:00                 EDWARDO melton [facility]

 

        2022 Emergency         Neeta Maria [facility]    1.2.840

.114 [id number] 

Univers



        16:26:00 17:36:00                 Edwardo Lopez 350.1.13.10    

     ity of



                                                [place] 4.2.7.2.686         [place]

[place]



                                                [place]  [phone]         [address]

 

        2022-10-31 2022-10-31 Emergency X       ERUM, [facility]    ERT     [id number]

47 Univers



        20:12:00 22:15:00                 ALMA melton [facility]

 

        2022-10-31 2022-10-31 Emergency         [facility]    1.2.132.823 [id number]

4196 Univers



        20:12:00 22:15:00                 Alma MCCARTHY 350.1.13.10         

ity of



                                                [place] 4.2.7.2.686         [place]

[place]



                                                [place]  [phone]         [address]

 

        2022-10-29 2022-10-29 Emergency X       LESLEY [facility]    ERT     [id number]

[id number] Univers



        19:14:00 22:40:00                 SILVINO                          ity [facility]

 

        2022-10-29 2022-10-29 Emergency         Lesley [facility]    1.2.227.062 [id number]

9139 Univers



        19:14:00 22:40:00                 Silvino MCCARTHY 350.1.13.10         

ity of



                                                [facility] 4.2.7.2.686         [place]

[place]



                                                CAMPUS  [phone]         Medi

sarah



                                                        [address]             [place]

 

        2022-10-25 2022-10-25 Transition         IMTIAZ Guzman  1.2.840.114 [id number]

[id number] Univers



        00:00:00 00:00:00 of Care         Dulce LYY   350.1.13.10        

 ity of



                                                [place]   4.2.7.2.686         [place]

[place]



                                                        [phone]         Medi

sarah



                                                        [address]             [place]

 

        2022-10-21 2022-10-23 [facility]       [facility]     [id number]

387 Univers



        00:19:00 12:45:00                 RACHEL sanchezy [facility]

 

        2022-10-21 2022-10-23 [facility]    1.2.8

40.114 [id number] 

Univers



        00:19:00 12:45:00 Encounter         Rachel Bailey 350.1.13.10 

        ity of



                                                [facility] 4.2.7.2.686         [facility]  [phone]         Medi

sarah



                                                        081             [place]

 

        2022-10-18 2022-10-18 Outpatient R       VAMSHI [facility]    [id number]

[id number] Univers



        08:00:00 08:00:00                 SAPPHIRE                         ity

 [facility]

 

        2022-10-10 2022-10-10 Outpatient R       VAMSHI [facility]    [id number]

[id number] Univers



        08:00:00 08:00:00                 SAPPHIRE                         ity

 [facility]

 

        2022 Outpatient R       MARY ANNE RAMIREZ [facility]    [id number]

271 Univers



        14:00:00 14:00:00                 MARY ANNE RAMIREZ                         ity [facility]

 

        2022 Outpatient R       VAMSHI [facility]    [id number]

[id number] Univers



        08:30:00 08:30:00                 SAPPHIRE                         ity

 of



                                                                        [facility]

 

        2022 Outpatient R       VAMSHI [facility]    [id number]

02516 Univers



        08:00:00 08:00:00                 SAPPHIRE                         ity

 of



                                                                        [facility]

 

        2022 Outpatient R       VAMSHI [facility]    [id number]

[id number] Univers



        11:30:00 11:30:00                 SAPPHIRE                         ity

 of



                                                                        [facility]

 

        2022 Telephone         EDEN Helms    1.2.840.114 [id number]

[id number] Univers



        00:00:00 00:00:00                 Yessica BROWN   350.1.13.10         i

ty of



                                                [facility] 4.2.7.2.686         Eric

as



                                                        [phone]         Medi

sarah



                                                        025             [place]

 

        2022 Telephone         [facility]    1.2.587.118 [id number]

1965 Univers



        00:00:00 00:00:00                 Jessi DENISE 350.1.13.10       

  ity of



                                                [place]   4.2.7.2.686         [place]

[place]



                                                [place]  [phone]         Medi

sarah



                                                AND [place] [address]             Branch



                                                DIABETES                 



                                                CLINIC                  

 

        2022-08-15 2022-08-15 Transition         IMTIAZ Rodney  1.2.840.114 [id number]

43010 Univers



        00:00:00 00:00:00 of Care         Stephany DUNN   350.1.13.10         i

ty of



                                                [place]   4.2.7.2.686         [place]

[place]



                                                        [phone]         Medi

sarah



                                                        403             [place]

 

        2022 Inpatient X       PEDRO SHARPE [facility]     [id number]

[id number] Univers



        22:57:00 15:56:00                                                 ity of



                                                                        [facility]

 

        2022 Hospital         Yo Law  1.2.840.

114 [id number] 

Univers



        22:57:00 15:56:00 Encounter         Keenan Uribe   350.1

.13.10         ity of



                                        [facility] 4.2.7.2.686     

    Liz Reynoso         [phone]     

    [facility]         095           

  [place]

 

        2022 Anesthesia         Sheridan Delarosa  1.2.84

0.114 [id number] 

Univers



        11:25:00 12:55:00 Event           [place]   350.1.13.10    

     ity of



                                                HOSPITAL 4.2.7.2.686         Eric

as



                                                        [phone]         Medi

sarah



                                                        103             [place]anch

 

        2022 Surgery         DAVID Bonds  1.2.781.765 [id number]

3251 Univers



        10:12:00 11:55:00                 Sapphiresarah BROWN   350.1.13.10        

 ity of



                                               HOSPITAL 4.2.7.2.686         Eric

as



                                                        [phone]         Medi

sarah



                                                        103             [place]anch

 

        2022 Travel                  1.2.840.1 1.2.597.688 7112

9911 Univers



        00:00:00 00:00:00                         89277.1.1 350.1.13.10         

ity of



                                                3.104.2.7 4.2.7.3.698         [place]

[place]



                                                .3.255828 084.8           Medica

l



                                                .8                      Branch

 

        2022 Transition         Thomas,  1.2.840.3 [id number]

[id number] Univers



        00:00:00 00:00:00 of Care         Dulce 32251.1.1                 i

ty of



                                                3.104.2.7                 [place]



                                                .3.213315                 Medica

l



                                                .8                      Branch

 

        2022 Inpatient X       TABITHA [facility]    KRISH     [id number]

04 Univers



        18:31:00 18:27:00                 SUNIL                          ity of



                                                                        [facility]

 

        2022 Hospital         Ashly Ortega 1.2.840.1 [id number]

[id number] 

Univers



        18:31:00 18:27:00 Encounter         Rachel Bailey 48387.1.1            

     ity of



                                        Sunil Lu 3.104.2.7                

 [place]



                                                .[phone]                 Medica

l



                                                .8                      Branch

 

        2022 Outpatient LESLIE MEADOWS [facility]    1041

[id number] Univers



        13:00:00 13:00:00                 RADHA                         ity o

f



                                                                        [facility]

 

        2022 Travel                  1.2.840.1 1.2.598.810 4852

5846 Univers



        00:00:00 00:00:00                         59280.1.1 350.1.13.10         

ity of



                                                3.104.2.7 4.2.7.3.698         [place]

[place]



                                                .3.150715 084.8           Medica

l



                                                .8                      [place]

 

        2022-07-15 2022-07-15 Emergency X       SINGER, [facility]    ERT     [id number]

93 Univers



        17:06:00 23:29:00                 VARINDER                         daniely [facility]

 

        2022-07-15 2022-07-15 Emergency         Tom [place] 1.2.840.1 1008

[id number]

                                        Univers



        17:06:00 23:29:00                 Varinder Sol 65275.1.1             

    ity of



                                                3.104.2.7                 [place]



                                                .3.653612                 Medica

l



                                                .8                      [place]

 

        2022-07-15 2022-07-15 Travel                  1.2.840.1 1.2.907.657 9988

2220 Univers



        00:00:00 00:00:00                         80255.1.1 350.1.13.10         

ity of



                                                3.104.2.7 4.2.7.3.698         [place]

[place]



                                                .3.666961 084.8           Medica

l



                                                .8                      [place]

 

        2022 Outpatient R       KENDRICK, [facility]    1040

[id number] Univers



        14:00:00 14:00:00                 RADHA melton o

f



                                                                        [facility]

 

        2022 Emergency X       BRIGIDO, [facility]    ERT     [id number]

86 Univers



        04:52:00 08:20:00                 RADHA                         ity of



                                                                        [facility]

 

        2022 Emergency         Brigido, 1.2.840.0 [id number]

62160 Univers



        04:52:00 08:20:00                 Radha 92234.1.1                 ity 

of



                                                3.104.2.7                 [place]



                                                .3.737123                 Medica

l



                                                .8                      Branch

 

        2022 Travel                  1.2.840.1 1.2.446.014 0302

5013 Univers



        00:00:00 00:00:00                         95795.1.1 350.1.13.10         

ity of



                                                3.104.2.7 4.2.7.3.698         [place]

[place]



                                                .3.476242 084.8           Medica

l



                                                .8                      Branch

 

        2022 Transition         Guzman,  1.2.840.8 [id number]

[id number] Univers



        00:00:00 00:00:00 of Care         Dulce 49931.1.1                 i

ty of



                                                3.104.2.7                 [place]



                                                .3.073792                 Medica

l



                                                .8                      [place]

 

        2022 Inpatient X       KENDRICK [facility]     [id number]

48201 Univers



        20:31:00 21:18:00                 KEM                           ity of



                                                                        [facility]

 

        2022 [employer]         Radha Fofana 1.2.840.1 [id number]

[id number] 

Univers



        20:31:00 21:18:00 Encounter         Noe Phillips 54424.1.1              

   ity of



                                        [place]in 3.104.2.7              

   [place]



                                                .3.020427                 Medica

l



                                                .8                      [place]

 

        2022 Transition         Guzman,  1.2.840.2 [id number]

[id number] Univers



        00:00:00 00:00:00 of Care         Dulce 55919.1.1                 i

ty of



                                                3.104.2.7                 [place]



                                                .3.543396                 Medica

l



                                                .8                      Branch

 

        2022 Travel                  1.2.840.1 1.2.231.859 5929

6235 Univers



        00:00:00 00:00:00                         87452.1.1 350.1.13.10         

ity of



                                                3.104.2.7 4.2.7.3.698         [place]

[place]



                                                .3.312605 084.8           Medica

l



                                                .8                      Branch

 

        2022 Transition         Guzman,  1.2.840.7 [id number]

[id number] Univers



        00:00:00 00:00:00 of Care         Dulce 08210.1.1                 i

ty of



                                                3.104.2.7                 [place]



                                                .3.307131                 Medica

l



                                                .8                      [place]

 

        2022 Inpatient X       JOHN [facility]     [id number]

48 Univers



        22:21:00 16:16:00                 EDWARDO                           ity [facility]

 

        2022 [facility] [employer] [facility]         Josse Giles 1.2.840.1 [id number]

[id number] 

Univers



        22:21:00 16:16:00 Encounter         Edwardo Lopez 89482.1.1             

    ity of



                                                3.104.2.7                 [place]



                                                .[id number]                 Medica

l



                                                .8                      [place]

 

        2022 Travel                  1.2.840.1 1.2.653.641 9669

3062 Univers



        00:00:00 00:00:00                         20408.1.1 350.1.13.10         

ity of



                                                3.104.2.7 4.2.7.3.698         [place]

[place]



                                                .3.195739 084.8           Medica

l



                                                .8                      [place]

 

        2022 2022-06-10 [facility]       [facility]     [id number]

8802 Univers



        18:25:00 19:30:00                 MERCY                           ity [facility]

 

        2022 2022-06-10 Emergency         Jose Ramonsofía Filipescooby 1.2.840.1 [id number]

[id number] 

Univers



        18:25:00 19:30:00                 Rachel Bailey 98609.1.1              

   ity of



                                                3.104.2.7                 [place]



                                                .3.866626                 Medica

l



                                                .8                      [place]

 

        2022 Travel                  1.2.840.1 1.2.847.836 6508

2645 Univers



        00:00:00 00:00:00                         33669.1.1 350.1.13.10         

ity of



                                                3.104.2.7 4.2.7.3.698         [place]

[place]



                                                .3.154277 084.8           Medica

l



                                                .8                      [place]

 

        2022 Travel                  1.2.840.1 1.2.788.877 1835

5045 Univers



        00:00:00 00:00:00                         17734.1.1 350.1.13.10         

ity of



                                                3.104.2.7 4.2.7.3.698         [place]

[place]



                                                .3.573760 084.8           Medica

l



                                                .8                      [place]

 

        2022 Transition         Rodney, 1.2.840.7 [id number]

[id number] Univers



        00:00:00 00:00:00 of Care         Stephany M 63339.1.1                 ity

 of



                                                3.104.2.7                 [place]



                                                .3.784696                 Medica

l



                                                .8                      [place]

 

        2022 Inpatient X       [facility]     [id number]

83 [facility]



        20:53:00 18:25:00                 [facility]                         geronimo [facility]

 

        2022 [facility] [employer] [facility]         Radha Fofana 1.2.840.1 [id number]

[id number] 

[facility]



        20:53:00 18:25:00 Encounter         YongTobias 69546.1.1           

      ity of



                                        Terminella, Efrain 3.104.2.7             

    [place]



                                        Martha Powell H .[id number]               

  [facility] .8                     

 [place]

 

        2022 Travel                  1.2.840.1 1.2.797.833 7922

3172 Univers



        00:00:00 00:00:00                         25673.1.1 350.1.13.10         

ity of



                                                3.104.2.7 4.2.7.3.698         [place]

[place]



                                                .3.983310 084.8           Medica

l



                                                .8                      [place]

 

        2022 Transition         Guzman,  1.2.840.8 [id number]

[id number] Univers



        00:00:00 00:00:00 of Care         Dulce 40667.1.1                 i

ty of



                                                3.104.2.7                 [place]



                                                .3.592062                 Medica

l



                                                .8                      Br[place]

 

        2022-05-10 2022 [facility]patient X       [facility]     [id number]

201 Univers



        19:10:00 17:32:00                 ADNAN                           ity [facility]

 

        2022-05-10 2022 [facility]         Radha Fofana 1.2.840.1 [id number]

[id number] 

Univers



        19:10:00 17:32:00 Encounter         Rand Dong 00610.1.1            

     ity of



                                                3.104.2.7                 [place]



                                                .3.949545                 Medica

l



                                                .8                      [place]

 

        2022-05-10 2022-05-10 Travel                  1.2.840.1 1.2.054.859 3732

6685 Univers



        00:00:00 00:00:00                         58498.1.1 350.1.13.10         

ity of



                                                3.104.2.7 4.2.7.3.698         [place]

[place]



                                                .3.078419 084.8           Medica

l



                                                .8                      [place]

 

        [facility]

[id number] [facility]



        12:00:00 22:50:00 [address]

 

        [facility]

[id number] [facility]



        12:00:00 22:50:00 [address]

 

        2022 Outpatient U       [place], [facility]    

    [facility]



        07:00:00 17:50:00                 [facility] [place]                          

 

        2022 Outpatient         [place], [facility]   5510

[id number] 



        07:00:00 17:50:00                 [place] [address]      

 

        2022 Outpatient         [place], [facility]   5510

[id number] 



        18:14:00 18:14:00                 Romero Blas      

 

        2022 [facility]       [facility]     [id number]

29 Univers



        22:45:00 01:52:00                 VARINDER melton [facility]

 

        2022 Emergency         [place], [facility]    1.2.052.512 [id number]

2685 Univers



        22:45:00 01:52:00                 Varinder MCCARTHY 350.1.13.10         i

ty of



                                                [place] 4.2.7.2.686         [facility]  [phone]         [address]

 

        2022 [facility]

26272 [facility]



        05:12:00 15:30:00                         r       [address]

 

        2022 Inpatient                 [facility] 91426

13372 Memoria



        05:12:00 15:30:00                         r       [address]

 

        2022 Outpatient         Ap,  [facility]   [id number]

[id number] 



        00:12:00 10:30:00                 Bernardo CaMary Anne                         

 

        2022 Emergency                 [facility] 70512

82347 Memoria



        02:51:00 02:51:00                         r       [address]

 

        2022 Emergency                 [facility] 17873

93067 Memoria



        02:51:00 02:51:00                         r       [address]

 

        2022 Outpatient         Ap,  [facility]   [id number]

620 



        00:12:00 00:12:00                 Bernardo Guajardo                         

 

        2022 Outpatient         Ap,  [facility]   [id number]

620 



        21:51:00 21:51:00                 Bernardo Guajardo                         

 

        2022 Emergency X       LINDA, K [facility]    ERT     [id number]

5665 Univers



        18:08:00 22:51:00                                                 ity of



                                                                        [facility]

 

        2022 Emergency         ALLIOSN Romeo [facility]    1.2.840.114 [id number]

[id number] Univers



        18:08:00 22:51:00                 Elena MCCARTHY 350.1.13.10         i

ty of



                                                [facility] 4.2.7.2.686         [facility]  [phone]         [address]

 

        2022 Transition         IMTIAZ Guzman  1.2.840.114 907

90105 Univers



        00:00:00 00:00:00 of Milka DUNN   350.1.13.10        

 ity of



                                                [place]   4.2.7.2.686         [place]

[place]



                                                        [phone]         Medi

sarah



                                                        403             Branch

 

        2022 Inpatient X       LESLEY [facility]    KRISH     [id number]

27 Univers



        19:07:00 19:39:00                 SILVINO                          [facility]

 

        2022 [facility] [employer] [facility]         Silvino Garay [facility]    1.2.840.

114 [id number] 

Univers



        19:07:00 19:39:00 Encounter         Rachel Bailey SHARI 350.1.13.10 

        ity of



                                                [facility] 4.2.7.2.686         [place]

[place]



                                                [place]  [phone]         [address]

 

        2022 [facility]       [facility]     [id number]

54 Univers



        14:41:00 22:07:00                 ALMA melton [facility]

 

        2022 Emergency         [place], [facility]    1.2.865.622 3751

7030 Univers



        14:41:00 22:07:00                 Alma MCCARTHY 350.1.13.10         

ity of



                                                [facility] 4.2.7.2.686         [facility]  [phone]         [address]

 

        2022-01-15 2022-01-15 Emergency X       [facility]    ERT     [id number]

25 Univers



        14:49:00 21:33:00                 NEETA melton [facility]

 

        2022-01-15 2022-01-15 Emergency         [facility]    1.2.497.227 9104

0725 Univers



        14:49:00 21:33:00                 Neeta MCCARTHY 350.1.13.10         

ity of



                                                [facility] 4.2.7.2.686         [facility]  [phone]         [address]

 

        2021 Laboratory         Only, Ang Db Test [facility]    1.2.8

40.114 [id number] 

Univers



        18:00:00 18:15:00 Only            Mario Alberto Stefano [facility]  350.1.13.10      

   ity of



                                                [facility] 4.2.7.2.686         Eric

as



                                                HÉCTOR?BLEA [phone]         [facility]



                                                [address]



                                                MEDICAL                 



                                                OFFICE                  



                                                BUILDING                 

 

        2021 Outpatient LESLIE LLANES   [facility]    [id number]

787 Univers



        18:00:00 18:00:00                 STEFANO                          lissa [facility]

 

        2021 (TEL)                   STLMLC  STLMLC  [id number] Co

mmon



        00:00:00 00:00:00                                                 [facility]

 

        2021 (ESTPT)                 STLMLC  STLMLC  [id number] Co

mmon



        00:00:00 00:00:00 Davise                                         Spi

rit



                        d Patient                                         - [facility]

 

        2021 Emergency X       [facility]     [id number]

74 Univers



        15:54:00 18:34:00                 VARINDER melton [facility]

 

        2021 Emergency         [facility]    1.2.270.197 [id number]

8236 Univers



        15:54:00 18:34:00                 Varinder MCCARTHY 350.1.13.10         i

ty [facility] 4.2.7.2.686         [facility]  [phone]         Medi

sarah



                                                        084             Branch

 

        2021-10-12 2021-10-12 (TEL)                   STLMLC  STLMLC  [id number] Co

mmon



        00:00:00 00:00:00                                                 [facility]

 

        2021 (ESTPT)                 STLMLC  STLMLC  [id number] Co

mmon



        00:00:00 00:00:00 Establishe                                         Spi

rit



                        d Patient                                         - [facility]



                                                                        [facility]

 

        2021-08-10 2021-08-10 (TEL)                   STLMLC  STLMLC  [id number] Co

mmon



        00:00:00 00:00:00                                                 [facility]

 

        2021 (ESTPT)                 STLMLC  STLMLC  [id number] Co

mmon



        00:00:00 00:00:00 Establishe                                         Spi

rit



                        d Patient                                         - [facility]



                                                                        [facility]

 

        2021 OFFICE                  STLMLC  STLMLC  [id number] Co

mmon



        00:00:00 00:00:00 VISIT NEW                                         Spir

it



                        PT LEVEL 3                                         - [facility]



                                                                        [facility]

 

        2021 Emergency         [facility]    1.2.840.114 [id number]

[id number] 



        18:22:00 22:21:00                 Gaboo TWAN Mccarthy 350.1.13.10        

 



                                                [place] 4.2.7.2.686         



                                                [place]  [phone]         



                                                        [facility]             

 

        2021 Emergency         [facility]    1.2.840.114 [id number]

[id number] [facility]



        18:22:00 22:21:00                 Kodiusho TWAN Mccarthy 350.1.13.10        

 ity of



                                                [place] 4.2.7.2.686         [facility]  [phone]         [address]

 

        2021 Emergency X       [facility]     [id number]

4744 Univers



        18:22:00 18:22:00                 FOLUSHO                         ity [facility]

 

        2019 Phone                   nullFlavo MNA     [id number]

55 Memoria



        16:11:26 04:59:59 Message                 r       Neurosurger 08      l



                                                        y [facility]

 

        2019 Phone                   nullFlavo MNA     [id number]

55 Memoria



        16:11:26 04:59:59 Message                 r       Neurosurger 08      l



                                                        y [facility]

 

        2019 Outpatient                 MHMISCHER MHMISCHER 551

[id number] 



        11:11:26 23:59:59                                         2019 Phone                   nullFlavo MNA     [id number]

55 Memoria



        16:16:47 04:59:59 Message                 r       Neurosurger 07      l



                                                        y [facility]

 

        2019 Phone                   nullFlavo MNA     [id number]

55 Memoria



        16:16:47 04:59:59 Message                 r       Neurosurger 07      l



                                                        y [facility]

 

        2019 Outpatient                 MHMISCHER MHMISCHER 551

[id number] 



        11:16:47 23:59:59                                         07      

 

        2019-07-15 2019 Phone                   nullFlavo MNA     [id number]

55 Memoria



        15:52:03 04:59:59 Message                 r       Neurosurger 06      l



                                                        y [facility]

 

        2019-07-15 2019 Phone                   nullFlavo MNA     [id number]

55 Memoria



        15:52:03 04:59:59 Message                 r       Neurosurger 06      l



                                                        y [facility]

 

        2019-07-15 2019 Outpatient                 MHMISCHER MHMISCHER 551

[id number] 



        10:52:03 23:59:59                                         2019 Phone                   nullFlavo MNA     [id number]

55 Memoria



        18:55:03 04:59:59 Message                 r       Neurosurger 05      l



                                                        y [facility]

 

        2019 Phone                   nullFlavo MNA     [id number]

55 Memoria



        18:55:03 04:59:59 Message                 r       Neurosurger 05      l



                                                        y [facility]

 

        2019 Outpatient                 MHMISCHER MHMISCHER 551

[id number] 



        13:55:03 23:59:59                                               

 

        2018 Emergency                 [facility] [address]41

63091 Memoria



        10:12:00 18:24:00                         [address]

 

        2018 Emergency                 nullFlavo [facility] 62330

30045 Memoria



        10:12:00 18:24:00                         [address]

 

        2018 Outpatient         Vijay [facility]   5510

[id number] 



        04:12:00 12:24:00                 Deisy Dial      

 

        2018 Outpatient                 [facility] [address]

[id number] Common



        11:15:00 11:15:00                         [place]   [address]         Spir

it



                                                Drive   [facility]

 

        2018 Outpatient                 [facility] [address]

[id number] Common



        11:30:00 11:30:00                         [place]   [address]         Spir

it



                                                Drive   [facility]

 

        2018 Outpatient                 [facility]

[id number] [place]



        15:41:00 15:41:00                         [place]   [address]         Spir

it



                                                Drive   [facility]

 

        2018 Outpatient                 [place] [facility] 14

[id number] Common



        15:42:00 15:42:00                         t [place]   [address]         Spir

it



                                                Drive   [facility]

 

        2018 Outpatient                 [place] [address]

[id number] Common



        11:00:00 11:00:00                         t [place]   [address]         Spir

it



                                                Drive   [facility]

 

        2018 Inpatient                 [facility] [address]13

[id number] Memoria



        22:40:00 17:28:00                         [address]

 

        2018 Inpatient                 [facility] [address]09

57171 Memoria



        22:40:00 17:28:00                         [address]

 

        2018 Outpatient         Deedee Reinoso [facility]   551

[id number] 



        17:40:00 12:28:00                 Jamie                   23      







Results







           Test Description Test Time  Test Comments Results    Result Comments 

Source









                    COMP. METABOLIC PANEL (22933) 2023 21:13:07 









                      Test Item  Value      Reference Range Interpretation Comme

nts









             NA (test code = 6646132172) 131 mmol/L   135-145      L            

 

             K (test code = 5603037618) 4.8 mmol/L   3.5-5.0                   

 

             CL (test code = 7879948958) 100 mmol/L                       

 

             CO2 TOTAL (test code = 4365038489) 22 mmol/L    23-31        L     

       

 

             AGAP (test code = 3877540536) 9            2-16                    

  

 

             BUN (test code = 2642736300) 14 mg/dL     7-23                     

 

 

             GLUCOSE (test code = 2368538081) 396 mg/dL           H       

     

 

             CREATININE (test code = 0.63 mg/dL   0.60-1.25                 



             2052785383)                                         

 

             TOTAL BILI (test code = 0.7 mg/dL    0.1-1.1                   



             1061091157)                                         

 

             CALCIUM (test code = 6777352903) 9.7 mg/dL    8.6-10.6             

     

 

             T PROTEIN (test code = 2288031569) 7.8 g/dL     6.3-8.2            

       

 

             ALBUMIN (test code = 0907690324) 4.3 g/dL     3.5-5.0              

     

 

             ALK PHOS (test code = 7072804040) 146 U/L             H      

      

 

             ALTv (test code = 1742-6) 21 U/L       5-50                      

 

             AST(SGOT) (test code = 6562294720) 15 U/L       13-40              

       

 

             eGFR (test code = 8894419533) 143.3        mL/min/1.73m2           

   

 

             MAL (test code = MAL) Association of Glomerular                    

       



                          Filtration Rate (GFR) and Staging                     

      



                          of Kidney Disease*                           



                          +-----------------------+--------                     

      



                          -------------+-------------------                     

      



                          ------+| GFR (mL/min/1.73 m2) ?|                      

     



                          With Kidney Damage ?| ?Without                        

   



                          Kidney                                 



                          Damage+-----------------------+--                     

      



                          -------------------+-------------                     

      



                          ------------+| ?>90 ? ? ? ? ? ? ?                     

      



                          ? ?| ?Stage one ? ? ? ? ?| ?                          

 



                          Normal ? ? ? ? ? ? ?                           



                          ?+-----------------------+-------                     

      



                          --------------+------------------                     

      



                          -------+| ?60-89 ? ? ? ? ? ? ? ?|                     

      



                          ?Stage two ? ? ? ? ?| ? Decreased                     

      



                          GFR ? ? ? ?                            



                          +-----------------------+--------                     

      



                          -------------+-------------------                     

      



                          ------+| ?30-59 ? ? ? ? ? ? ? ?|                      

     



                          ?Stage three ? ? ? ?| ? Stage                         

  



                          three ? ? ? ? ?                           



                          +-----------------------+--------                     

      



                          -------------+-------------------                     

      



                          ------+| ?15-29 ? ? ? ? ? ? ? ?|                      

     



                          ?Stage four ? ? ? ? | ? Stage                         

  



                          four ? ? ? ? ?                           



                          ?+-----------------------+-------                     

      



                          --------------+------------------                     

      



                          -------+| ?<15 (or dialysis) ? ?|                     

      



                          ?Stage five ? ? ? ? | ? Stage                         

  



                          five ? ? ? ? ?                           



                          ?+-----------------------+-------                     

      



                          --------------+------------------                     

      



                          -------+ *Each stage assumes the                      

     



                          associated GFR level has been in                      

     



                          effect for at least three months.                     

      



                          ?Stages 1 to 5, with or without                       

    



                          kidney disease, indicate chronic                      

     



                          kidney disease. Notes:                           



                          Determination of stages one and                       

    



                          two (with eGFR >59mL/min/1.73 m2)                     

      



                          requires estimation of kidney                         

  



                          damage for at least three months                      

     



                          as defined by structural or                           



                          functional abnormalities of the                       

    



                          kidney, manifested by                           



                          either:Pathological abnormalities                     

      



                          or Markers of kidney damage                           



                          (including abnormalities in the                       

    



                          composition of the blood or urine                     

      



                          or abnormalities in imaging                           



                          tests).                                

 

             Lab Interpretation (test code = Abnormal                           

    



             32465-5)                                            



[facility] WITH [id number] 21:09:28





             Test Item    Value        Reference Range Interpretation Comments

 

             WBC (test code = 12.68        See_Comment  H             [Automated



             8338-2)                                             message] The sy

stem



                                                                 which generated



                                                                 this result



                                                                 transmitted



                                                                 reference range

:



                                                                 4.20 - 10.70



                                                                 10*3/?L. The



                                                                 reference range

 was



                                                                 not used to



                                                                 interpret this



                                                                 result as



                                                                 normal/abnormal

.

 

             RBC (test code = 5.46         See_Comment                [Automated



             789-8)                                              message] The sy

stem



                                                                 which generated



                                                                 this result



                                                                 transmitted



                                                                 reference range

:



                                                                 4.26 - 5.52



                                                                 10*6/?L. The



                                                                 reference range

 was



                                                                 not used to



                                                                 interpret this



                                                                 result as



                                                                 normal/abnormal

.

 

             HGB (test code = 16.2 g/dL    12.2-16.4                 



             718-7)                                              

 

             HCT (test code = 46.5 %       38.4-49.3                 



             4544-3)                                             

 

             MCV (test code = 85.2 fL      81.7-95.6                 



             787-2)                                              

 

             MCH (test code = 29.7 pg      26.1-32.7                 



             785-6)                                              

 

             MCHC (test code = 34.8 g/dL    31.2-35.0                 



             786-4)                                              

 

             RDW-SD (test code = 39.9 fL      38.5-51.6                 



             65842-1)                                            

 

             RDW-CV (test code = 13.1 %       12.1-15.4                 



             788-0)                                              

 

             PLT (test code = 444          See_Comment  H             [Automated



             777-3)                                              message] The sy

stem



                                                                 which generated



                                                                 this result



                                                                 transmitted



                                                                 reference range

:



                                                                 150 - 328 10*3/

?L.



                                                                 The reference r

zhen



                                                                 was not used to



                                                                 interpret this



                                                                 result as



                                                                 normal/abnormal

.

 

             MPV (test code = 10.0 fL      9.8-13.0                  



             81336-9)                                            

 

             NRBC/100 WBC (test 0.0          See_Comment                [Automat

ed



             code = 3701570690)                                        message] 

The system



                                                                 which generated



                                                                 this result



                                                                 transmitted



                                                                 reference range

:



                                                                 0.0 - 10.0 /100



                                                                 WBCs. The refer

ence



                                                                 range was not u

sed



                                                                 to interpret th

is



                                                                 result as



                                                                 normal/abnormal

.

 

             NRBC x10^3 (test code              See_Comment                [Auto

mated



             = [id number])                                        message] The s

ystem



                                                                 which generated



                                                                 this result



                                                                 transmitted



                                                                 reference range

:



                                                                 10*3/?L. The



                                                                 reference range

 was



                                                                 not used to



                                                                 interpret this



                                                                 result as



                                                                 normal/abnormal

.

 

             GRAN MAT (NEUT) % 76.0 %                                 



             (test code = 770-8)                                        

 

             IMM GRAN % (test code 0.30 %                                 



             = 5022098000)                                        

 

             LYMPH % (test code = 14.0 %                                 



             736-9)                                              

 

             MONO % (test code = 7.6 %                                  



             5905-5)                                             

 

             EOS % (test code = 1.8 %                                  



             713-8)                                              

 

             BASO % (test code = 0.3 %                                  



             706-2)                                              

 

             GRAN MAT x10^3(ANC) 9.63 10*3/uL 1.99-6.95    H            



             (test code =                                        



             1522666886)                                         

 

             IMM GRAN x10^3 (test 0.04 10*3/uL 0.00-0.06                 



             code = 7416651964)                                        

 

             LYMPH x10^3 (test code 1.77 10*3/uL 1.09-3.23                 



             = 731-0)                                            

 

             MONO x10^3 (test code 0.97 10*3/uL 0.36-1.02                 



             = 742-7)                                            

 

             EOS x10^3 (test code = 0.23 10*3/uL 0.06-0.53                 



             711-2)                                              

 

             BASO x10^3 (test code 0.04 10*3/uL 0.01-0.09                 



             = 704-7)                                            

 

             Lab Interpretation Abnormal                               



             (test code = 94006-3)                                        



[facility] GLUCOSE(AGE &gt;30DAYS)2023 
20:33:00





             Test Item    Value        Reference Range Interpretation Comments

 

             POCT Glu (age>30days) (test code = 429 mg/dL           A     

       



             3342)                                               

 

             Lab Interpretation (test code = Abnormal                           

    



             44565-8)                                            



[facility] GLUCOSE (AUTOMATED)2023 22:47:23





             Test Item    Value        Reference Range Interpretation Comments

 

             POCT GLU (test code = 1073592814) 352 mg/dL           H      

      

 

             Lab Interpretation (test code = Abnormal                           

    



             43126-2)                                            



[facility] GLUCOSE (AUTOMATED)2023 19:38:18





             Test Item    Value        Reference Range Interpretation Comments

 

             POCT GLU (test code = 4690813231) 452 mg/dL           HH     

      

 

             Lab Interpretation (test code = Abnormal                           

    



             27122-3)                                            



[facility] GLUCOSE (AUTOMATED)2023-04-10 02:41:30





             Test Item    Value        Reference Range Interpretation Comments

 

             POCT GLU (test code = 9870573462) 379 mg/dL           H      

      

 

             Lab Interpretation (test code = Abnormal                           

    



             29786-0)                                            



[facility]. METABOLIC PANEL (12504)2023-04-10 
00:29:35





             Test Item    Value        Reference Range Interpretation Comments

 

             NA (test code = 133 mmol/L   135-145      L            



             6815891789)                                         

 

             K (test code = 3.5 mmol/L   3.5-5.0                   



             5339515972)                                         

 

             CL (test code = 97 mmol/L           L            



             3490712874)                                         

 

             CO2 TOTAL (test code = 26 mmol/L    23-31                     



             4398459222)                                         

 

             AGAP (test code = 10           2-16                      



             7043626525)                                         

 

             BUN (test code = 7 mg/dL      7-23                      



             4687694620)                                         

 

             GLUCOSE (test code = 468 mg/dL           HH           



             1929812706)                                         

 

             CREATININE (test code = 0.58 mg/dL   0.60-1.25    L            



             0491246074)                                         

 

             TOTAL BILI (test code = 0.6 mg/dL    0.1-1.1                   



             8435423774)                                         

 

             CALCIUM (test code = 9.5 mg/dL    8.6-10.6                  



             2103803631)                                         

 

             T PROTEIN (test code = 7.6 g/dL     6.3-8.2                   



             4075294046)                                         

 

             ALBUMIN (test code = 4.2 g/dL     3.5-5.0                   



             0046469728)                                         

 

             ALK PHOS (test code = 197 U/L             H            



             1507274970)                                         

 

             ALTv (test code = 56 U/L       5-50         H            



             1742-6)                                             

 

             AST(SGOT) (test code = 16 U/L       13-40                     



             7878521498)                                         

 

             eGFR (test code = 157.7        mL/min/1.73m2              



             0739539932)                                         

 

             MAL (test code = MAL) Association of                           



                          Glomerular Filtration                           



                          Rate (GFR) and Staging                           



                          of Kidney Disease*                           



                          +---------------------                           



                          --+-------------------                           



                          --+-------------------                           



                          ------+| GFR                           



                          (mL/min/1.73 m2) ?|                           



                          With Kidney Damage ?|                           



                          ?Without Kidney                           



                          Damage+---------------                           



                          --------+-------------                           



                          --------+-------------                           



                          ------------+| ?>90 ?                           



                          ? ? ? ? ? ? ? ?|                           



                          ?Stage one ? ? ? ? ?|                           



                          ? Normal ? ? ? ? ? ? ?                           



                          ?+--------------------                           



                          ---+------------------                           



                          ---+------------------                           



                          -------+| ?60-89 ? ? ?                           



                          ? ? ? ? ?| ?Stage two                           



                          ? ? ? ? ?| ? Decreased                           



                          GFR ? ? ? ?                            



                          +---------------------                           



                          --+-------------------                           



                          --+-------------------                           



                          ------+| ?30-59 ? ? ?                           



                          ? ? ? ? ?| ?Stage                           



                          three ? ? ? ?| ? Stage                           



                          three ? ? ? ? ?                           



                          +---------------------                           



                          --+-------------------                           



                          --+-------------------                           



                          ------+| ?15-29 ? ? ?                           



                          ? ? ? ? ?| ?Stage four                           



                          ? ? ? ? | ? Stage four                           



                          ? ? ? ? ?                              



                          ?+--------------------                           



                          ---+------------------                           



                          ---+------------------                           



                          -------+| ?<15 (or                           



                          dialysis) ? ?| ?Stage                           



                          five ? ? ? ? | ? Stage                           



                          five ? ? ? ? ?                           



                          ?+--------------------                           



                          ---+------------------                           



                          ---+------------------                           



                          -------+ *Each stage                           



                          assumes the associated                           



                          GFR level has been in                           



                          effect for at least                           



                          three months. ?Stages                           



                          1 to 5, with or                           



                          without kidney                           



                          disease, indicate                           



                          chronic kidney                           



                          disease. Notes:                           



                          Determination of                           



                          stages one and two                           



                          (with eGFR                             



                          >59mL/min/1.73 m2)                           



                          requires estimation of                           



                          kidney damage for at                           



                          least three months as                           



                          defined by structural                           



                          or functional                           



                          abnormalities of the                           



                          kidney, manifested by                           



                          either:Pathological                           



                          abnormalities or                           



                          Markers of kidney                           



                          damage (including                           



                          abnormalities in the                           



                          composition of the                           



                          blood or urine or                           



                          abnormalities in                           



                          imaging tests).                           

 

             Lab Interpretation Abnormal                               



             (test code = 20180-6)                                        



[id number] 23:54:19





             Test Item    Value        Reference Range Interpretation Comments

 

             PH (test code = 2) 7.37         7.35-7.45                 

 

             PCO2 (test code = 47           See_Comment  H             [Automate

d



             5143609066)                                         message] The sy

stem



                                                                 which generated



                                                                 this result



                                                                 transmitted



                                                                 reference range

: 35



                                                                 - 45 mmHg. The



                                                                 reference range

 was



                                                                 not used to



                                                                 interpret this



                                                                 result as



                                                                 normal/abnormal

.

 

             PO2 (test code = 25           See_Comment  LL            [Automated



             4206134532)                                         message] The sy

stem



                                                                 which generated



                                                                 this result



                                                                 transmitted



                                                                 reference range

: 80



                                                                 - 100 mmHg. The



                                                                 reference range

 was



                                                                 not used to



                                                                 interpret this



                                                                 result as



                                                                 normal/abnormal

.

 

             HCO3 (test code = 26           See_Comment                [Automate

d



             5756079992)                                         message] The sy

stem



                                                                 which generated



                                                                 this result



                                                                 transmitted



                                                                 reference range

: 22



                                                                 - 26 mEq/L. The



                                                                 reference range

 was



                                                                 not used to



                                                                 interpret this



                                                                 result as



                                                                 normal/abnormal

.

 

             BE (test code = 0.3          See_Comment                [Automated



             [id number])                                         message] The sy

stem



                                                                 which generated



                                                                 this result



                                                                 transmitted



                                                                 reference range

:



                                                                 -3.0 - 3.0 mEq/

L.



                                                                 The reference r

zhen



                                                                 was not used to



                                                                 interpret this



                                                                 result as



                                                                 normal/abnormal

.

 

             THB (test code = 18.4 g/dL    13.5-18.0    H            



             7118481995)                                         

 

             %O2HB (test code = 51.3 %       94.0-99.0    L            



             7602429300)                                         

 

             %COHB ART (test code = 4.3 %        0.0-1.5      H            



             3374844918)                                         

 

             %METHB ART (test code = 0.0 %        0.4-1.5      L            



             3339680263)                                         

 

             VOL%O2 ART (test code = 13.2 %       15.0-23.0    L            



             2890251034)                                         

 

             NA (test code = 136 mmol/L   135-145                   



             6356341026)                                         

 

             K+ (test code = 3.7 mmol/L   3.5-5.0                   



             3219529484)                                         

 

             AC CA IONZ (test code = 4.70 mg/dL   4.50-5.30                 



             6331248495)                                         

 

             GLUCOSE (test code = 471 mg/dL           HH           



             8073513702)                                         

 

             LACTIC ACID (test code 2.17 mmol/L  0.50-2.20                 



             = 5537659191)                                        

 

             Lab Interpretation Abnormal                               



             (test code = 45782-4)                                        



[facility] WITH [id number] 23:52:19





             Test Item    Value        Reference Range Interpretation Comments

 

             WBC (test code = 9.50         See_Comment                [Automated



             7877-2)                                             message] The sy

stem



                                                                 which generated



                                                                 this result



                                                                 transmitted



                                                                 reference range

:



                                                                 4.20 - 10.70



                                                                 10*3/?L. The



                                                                 reference range

 was



                                                                 not used to



                                                                 interpret this



                                                                 result as



                                                                 normal/abnormal

.

 

             RBC (test code = 5.27         See_Comment                [Automated



             234-8)                                              message] The sy

stem



                                                                 which generated



                                                                 this result



                                                                 transmitted



                                                                 reference range

:



                                                                 4.26 - 5.52



                                                                 10*6/?L. The



                                                                 reference range

 was



                                                                 not used to



                                                                 interpret this



                                                                 result as



                                                                 normal/abnormal

.

 

             HGB (test code = 15.9 g/dL    12.2-16.4                 



             718-7)                                              

 

             HCT (test code = 46.1 %       38.4-49.3                 



             4544-3)                                             

 

             MCV (test code = 87.5 fL      81.7-95.6                 



             787-2)                                              

 

             MCH (test code = 30.2 pg      26.1-32.7                 



             785-6)                                              

 

             MCHC (test code = 34.5 g/dL    31.2-35.0                 



             786-4)                                              

 

             RDW-SD (test code = 41.8 fL      38.5-51.6                 



             71086-6)                                            

 

             RDW-CV (test code = 13.2 %       12.1-15.4                 



             788-0)                                              

 

             PLT (test code = 369          See_Comment  H             [Automated



             927-3)                                              message] The sy

stem



                                                                 which generated



                                                                 this result



                                                                 transmitted



                                                                 reference range

:



                                                                 150 - 328 10*3/

?L.



                                                                 The reference r

zhen



                                                                 was not used to



                                                                 interpret this



                                                                 result as



                                                                 normal/abnormal

.

 

             MPV (test code = 10.3 fL      9.8-13.0                  



             20101-5)                                            

 

             NRBC/100 WBC (test 0.0          See_Comment                [Automat

ed



             code = 2360111570)                                        message] 

The system



                                                                 which generated



                                                                 this result



                                                                 transmitted



                                                                 reference range

:



                                                                 0.0 - 10.0 /100



                                                                 WBCs. The refer

ence



                                                                 range was not u

sed



                                                                 to interpret th

is



                                                                 result as



                                                                 normal/abnormal

.

 

             NRBC x10^3 (test code              See_Comment                [Auto

mated



             = 7687758615)                                        message] The s

ystem



                                                                 which generated



                                                                 this result



                                                                 transmitted



                                                                 reference range

:



                                                                 10*3/?L. The



                                                                 reference range

 was



                                                                 not used to



                                                                 interpret this



                                                                 result as



                                                                 normal/abnormal

.

 

             GRAN MAT (NEUT) % 65.5 %                                 



             (test code = 770-8)                                        

 

             IMM GRAN % (test code 0.70 %                                 



             = 9063603122)                                        

 

             LYMPH % (test code = 24.9 %                                 



             736-9)                                              

 

             MONO % (test code = 7.8 %                                  



             5905-5)                                             

 

             EOS % (test code = 0.8 %                                  



             713-8)                                              

 

             BASO % (test code = 0.3 %                                  



             706-2)                                              

 

             GRAN MAT x10^3(ANC) 6.21 10*3/uL 1.99-6.95                 



             (test code =                                        



             9562874965)                                         

 

             IMM GRAN x10^3 (test 0.07 10*3/uL 0.00-0.06    H            



             code = 4028838802)                                        

 

             LYMPH x10^3 (test code 2.37 10*3/uL 1.09-3.23                 



             = 731-0)                                            

 

             MONO x10^3 (test code 0.74 10*3/uL 0.36-1.02                 



             = 742-7)                                            

 

             EOS x10^3 (test code = 0.08 10*3/uL 0.06-0.53                 



             711-2)                                              

 

             BASO x10^3 (test code 0.03 10*3/uL 0.01-0.09                 



             = 704-7)                                            

 

             Lab Interpretation Abnormal                               



             (test code = 48954-3)                                        



[facility] GLUCOSE (AUTOMATED)2023 00:15:39





             Test Item    Value        Reference Range Interpretation Comments

 

             POCT GLU (test code = 8912733753) 353 mg/dL           H      

      

 

             Lab Interpretation (test code = Abnormal                           

    



             00893-8)                                            



[facility]. METABOLIC PANEL (73233)2023 
22:59:53





             Test Item    Value        Reference Range Interpretation Comments

 

             NA (test code = 137 mmol/L   135-145                   



             9958764496)                                         

 

             K (test code = 5.1 mmol/L   3.5-5.0      H            



             7203496457)                                         

 

             CL (test code = 101 mmol/L                       



             4794953086)                                         

 

             CO2 TOTAL (test code = 23 mmol/L    23-31                     



             4136197105)                                         

 

             AGAP (test code = 13           2-16                      



             1942427241)                                         

 

             BUN (test code = 15 mg/dL     7-23                      



             5514145067)                                         

 

             GLUCOSE (test code = 455 mg/dL           HH           



             9200062158)                                         

 

             CREATININE (test code = 0.51 mg/dL   0.60-1.25    L            



             6800276006)                                         

 

             TOTAL BILI (test code = 0.7 mg/dL    0.1-1.1                   



             6341012517)                                         

 

             CALCIUM (test code = 9.8 mg/dL    8.6-10.6                  



             9047595920)                                         

 

             T PROTEIN (test code = 8.1 g/dL     6.3-8.2                   



             9164661676)                                         

 

             ALBUMIN (test code = 4.3 g/dL     3.5-5.0                   



             3235376597)                                         

 

             ALK PHOS (test code = 161 U/L             H            



             9712887082)                                         

 

             ALTv (test code = 34 U/L       5-50                      



             1742-6)                                             

 

             AST(SGOT) (test code = 26 U/L       13-40                     



             7310615438)                                         

 

             eGFR (test code = 182.9        mL/min/1.73m2              



             3196183494)                                         

 

             MAL (test code = MAL) Association of                           



                          Glomerular Filtration                           



                          Rate (GFR) and Staging                           



                          of Kidney Disease*                           



                          +---------------------                           



                          --+-------------------                           



                          --+-------------------                           



                          ------+| GFR                           



                          (mL/min/1.73 m2) ?|                           



                          With Kidney Damage ?|                           



                          ?Without Kidney                           



                          Damage+---------------                           



                          --------+-------------                           



                          --------+-------------                           



                          ------------+| ?>90 ?                           



                          ? ? ? ? ? ? ? ?|                           



                          ?Stage one ? ? ? ? ?|                           



                          ? Normal ? ? ? ? ? ? ?                           



                          ?+--------------------                           



                          ---+------------------                           



                          ---+------------------                           



                          -------+| ?60-89 ? ? ?                           



                          ? ? ? ? ?| ?Stage two                           



                          ? ? ? ? ?| ? Decreased                           



                          GFR ? ? ? ?                            



                          +---------------------                           



                          --+-------------------                           



                          --+-------------------                           



                          ------+| ?30-59 ? ? ?                           



                          ? ? ? ? ?| ?Stage                           



                          three ? ? ? ?| ? Stage                           



                          three ? ? ? ? ?                           



                          +---------------------                           



                          --+-------------------                           



                          --+-------------------                           



                          ------+| ?15-29 ? ? ?                           



                          ? ? ? ? ?| ?Stage four                           



                          ? ? ? ? | ? Stage four                           



                          ? ? ? ? ?                              



                          ?+--------------------                           



                          ---+------------------                           



                          ---+------------------                           



                          -------+| ?<15 (or                           



                          dialysis) ? ?| ?Stage                           



                          five ? ? ? ? | ? Stage                           



                          five ? ? ? ? ?                           



                          ?+--------------------                           



                          ---+------------------                           



                          ---+------------------                           



                          -------+ *Each stage                           



                          assumes the associated                           



                          GFR level has been in                           



                          effect for at least                           



                          three months. ?Stages                           



                          1 to 5, with or                           



                          without kidney                           



                          disease, indicate                           



                          chronic kidney                           



                          disease. Notes:                           



                          Determination of                           



                          stages one and two                           



                          (with eGFR                             



                          >59mL/min/1.73 m2)                           



                          requires estimation of                           



                          kidney damage for at                           



                          least three months as                           



                          defined by structural                           



                          or functional                           



                          abnormalities of the                           



                          kidney, manifested by                           



                          either:Pathological                           



                          abnormalities or                           



                          Markers of kidney                           



                          damage (including                           



                          abnormalities in the                           



                          composition of the                           



                          blood or urine or                           



                          abnormalities in                           



                          imaging tests).                           

 

             Lab Interpretation Abnormal                               



             (test code = 93763-6)                                        



[facility]





             Test Item    Value        Reference Range Interpretation Comments

 

             MAGNESIUM (test code = 2595277721) 1.7 mg/dL    1.7-2.4            

       

 

             Lab Interpretation (test code = Normal                             

    



             38082-2)                                            



[facility]





             Test Item    Value        Reference Range Interpretation Comments

 

             LIPASE (test code = 5231563493) 106 U/L      0-220                 

    

 

             Lab Interpretation (test code = Normal                             

    



             98485-6)                                            



[facility]37[facility]17





             Test Item    Value        Reference Range Interpretation Comments

 

             POCT GLU (test code = 0968621813) 425 mg/dL           H      

      

 

             Lab Interpretation (test code = Abnormal                           

    



             57757-0)                                            



[facility] [id number] 22:26:01





             Test Item    Value        Reference Range Interpretation Comments

 

             LACTIC ACID (test code = 2.67 mmol/L  0.50-2.20    H            



             2133303645)                                         

 

             Lab Interpretation (test code = Abnormal                           

    



             68378-9)                                            



[facility] WITH [id number] 22:24:50





             Test Item    Value        Reference Range Interpretation Comments

 

             WBC (test code = 12.05        See_Comment  H             [Automated



             6690-2)                                             message] The sy

stem



                                                                 which generated



                                                                 this result



                                                                 transmitted



                                                                 reference range

:



                                                                 4.20 - 10.70



                                                                 10*3/?L. The



                                                                 reference range

 was



                                                                 not used to



                                                                 interpret this



                                                                 result as



                                                                 normal/abnormal

.

 

             RBC (test code = 5.14         See_Comment                [Automated



             789-8)                                              message] The sy

stem



                                                                 which generated



                                                                 this result



                                                                 transmitted



                                                                 reference range

:



                                                                 4.26 - 5.52



                                                                 10*6/?L. The



                                                                 reference range

 was



                                                                 not used to



                                                                 interpret this



                                                                 result as



                                                                 normal/abnormal

.

 

             HGB (test code = 15.4 g/dL    12.2-16.4                 



             718-7)                                              

 

             HCT (test code = 45.6 %       38.4-49.3                 



             4544-3)                                             

 

             MCV (test code = 88.7 fL      81.7-95.6                 



             787-2)                                              

 

             MCH (test code = 30.0 pg      26.1-32.7                 



             785-6)                                              

 

             MCHC (test code = 33.8 g/dL    31.2-35.0                 



             786-4)                                              

 

             RDW-SD (test code = 45.1 fL      38.5-51.6                 



             41440-5)                                            

 

             RDW-CV (test code = 14.0 %       12.1-15.4                 



             788-0)                                              

 

             PLT (test code = 518          See_Comment  H             [Automated



             777-3)                                              message] The sy

stem



                                                                 which generated



                                                                 this result



                                                                 transmitted



                                                                 reference range

:



                                                                 150 - 328 10*3/

?L.



                                                                 The reference r

zhen



                                                                 was not used to



                                                                 interpret this



                                                                 result as



                                                                 normal/abnormal

.

 

             MPV (test code = 10.2 fL      9.8-13.0                  



             35070-9)                                            

 

             NRBC/100 WBC (test 0.0          See_Comment                [Automat

ed



             code = 8667675780)                                        message] 

The system



                                                                 which generated



                                                                 this result



                                                                 transmitted



                                                                 reference range

:



                                                                 0.0 - 10.0 /100



                                                                 WBCs. The refer

ence



                                                                 range was not u

sed



                                                                 to interpret th

is



                                                                 result as



                                                                 normal/abnormal

.

 

             NRBC x10^3 (test code              See_Comment                [Auto

mated



             = [id number])                                        message] The s

ystem



                                                                 which generated



                                                                 this result



                                                                 transmitted



                                                                 reference range

:



                                                                 10*3/?L. The



                                                                 reference range

 was



                                                                 not used to



                                                                 interpret this



                                                                 result as



                                                                 normal/abnormal

.

 

             GRAN MAT (NEUT) % 76.2 %                                 



             (test code = 770-8)                                        

 

             IMM GRAN % (test code 0.50 %                                 



             = 6142936046)                                        

 

             LYMPH % (test code = 16.8 %                                 



             736-9)                                              

 

             MONO % (test code = 5.2 %                                  



             5905-5)                                             

 

             EOS % (test code = 1.0 %                                  



             713-8)                                              

 

             BASO % (test code = 0.3 %                                  



             706-2)                                              

 

             GRAN MAT x10^3(ANC) 9.18 10*3/uL 1.99-6.95    H            



             (test code =                                        



             8009341319)                                         

 

             IMM GRAN x10^3 (test 0.06 10*3/uL 0.00-0.06                 



             code = 8148846363)                                        

 

             LYMPH x10^3 (test code 2.02 10*3/uL 1.09-3.23                 



             = 731-0)                                            

 

             MONO x10^3 (test code 0.63 10*3/uL 0.36-1.02                 



             = 742-7)                                            

 

             EOS x10^3 (test code = 0.12 10*3/uL 0.06-0.53                 



             711-2)                                              

 

             BASO x10^3 (test code 0.04 10*3/uL 0.01-0.09                 



             = 704-7)                                            

 

             Lab Interpretation Abnormal                               



             (test code = 52168-8)                                        



[facility] GLUCOSE (AUTOMATED)2023 18:23:19





             Test Item    Value        Reference Range Interpretation Comments

 

             POCT GLU (test code = 9810253206) 352 mg/dL           H      

      

 

             Lab Interpretation (test code = Abnormal                           

    



             97701-4)                                            



[facility] GLUCOSE (AUTOMATED)2023 18:23:19





             Test Item    Value        Reference Range Interpretation Comments

 

             POCT GLU (test code = 4107082882) 352 mg/dL           H      

      

 

             Lab Interpretation (test code = Abnormal                           

    



             49472-0)                                            



[facility] GLUCOSE (AUTOMATED)2023 13:43:13





             Test Item    Value        Reference Range Interpretation Comments

 

             POCT GLU (test code = 3410160929) 293 mg/dL           H      

      

 

             Lab Interpretation (test code = Abnormal                           

    



             30529-4)                                            



[facility] GLUCOSE (AUTOMATED)2023 13:43:13





             Test Item    Value        Reference Range Interpretation Comments

 

             POCT GLU (test code = 5178977962) 293 mg/dL           H      

      

 

             Lab Interpretation (test code = Abnormal                           

    



             86870-7)                                            



[facility] GLUCOSE (AUTOMATED)2023 02:50:04





             Test Item    Value        Reference Range Interpretation Comments

 

             POCT GLU (test code = 7515588831) 259 mg/dL           H      

      

 

             Lab Interpretation (test code = Abnormal                           

    



             97766-6)                                            



[facility] GLUCOSE (AUTOMATED)2023 02:50:04





             Test Item    Value        Reference Range Interpretation Comments

 

             POCT GLU (test code = 2851487161) 259 mg/dL           H      

      

 

             Lab Interpretation (test code = Abnormal                           

    



             40203-2)                                            



[facility] GLUCOSE (AUTOMATED)2023 22:58:25





             Test Item    Value        Reference Range Interpretation Comments

 

             POCT GLU (test code = 4407948220) 170 mg/dL           H      

      

 

             Lab Interpretation (test code = Abnormal                           

    



             34026-7)                                            



[facility] GLUCOSE (AUTOMATED)2023 22:58:25





             Test Item    Value        Reference Range Interpretation Comments

 

             POCT GLU (test code = 2046663152) 170 mg/dL           H      

      

 

             Lab Interpretation (test code = Abnormal                           

    



             30860-7)                                            



[facility] GLUCOSE (AUTOMATED)2023 21:27:00





             Test Item    Value        Reference Range Interpretation Comments

 

             POCT GLU (test code = 2867909940) 179 mg/dL           H      

      

 

             Lab Interpretation (test code = Abnormal                           

    



             39695-2)                                            



[facility] GLUCOSE (AUTOMATED)2023 21:27:00





             Test Item    Value        Reference Range Interpretation Comments

 

             POCT GLU (test code = 1089856834) 179 mg/dL           H      

      

 

             Lab Interpretation (test code = Abnormal                           

    



             58171-8)                                            



[facility] GLUCOSE (AUTOMATED)2023 18:18:50





             Test Item    Value        Reference Range Interpretation Comments

 

             POCT GLU (test code = 5179049156) 247 mg/dL           H      

      

 

             Lab Interpretation (test code = Abnormal                           

    



             86853-5)                                            



[facility] GLUCOSE (AUTOMATED)2023 18:18:50





             Test Item    Value        Reference Range Interpretation Comments

 

             POCT GLU (test code = 7735361698) 247 mg/dL           H      

      

 

             Lab Interpretation (test code = Abnormal                           

    



             24700-2)                                            



[facility] GLUCOSE (AUTOMATED)2023 15:21:08





             Test Item    Value        Reference Range Interpretation Comments

 

             POCT GLU (test code = 2401763878) 235 mg/dL           H      

      

 

             Lab Interpretation (test code = Abnormal                           

    



             39829-6)                                            



[facility] GLUCOSE (AUTOMATED)2023 15:21:08





             Test Item    Value        Reference Range Interpretation Comments

 

             POCT GLU (test code = 7849886399) 235 mg/dL           H      

      

 

             Lab Interpretation (test code = Abnormal                           

    



             85066-5)                                            



[facility] GLUCOSE (AUTOMATED)2023 03:11:07





             Test Item    Value        Reference Range Interpretation Comments

 

             POCT GLU (test code = 0379855513) 263 mg/dL           H      

      

 

             Lab Interpretation (test code = Abnormal                           

    



             85307-1)                                            



[facility] GLUCOSE (AUTOMATED)2023 03:11:07





             Test Item    Value        Reference Range Interpretation Comments

 

             POCT GLU (test code = 7725518629) 263 mg/dL           H      

      

 

             Lab Interpretation (test code = Abnormal                           

    



             30831-8)                                            



[facility] GLUCOSE (AUTOMATED)2023 22:43:48





             Test Item    Value        Reference Range Interpretation Comments

 

             POCT GLU (test code = 4534828851) 262 mg/dL           H      

      

 

             Lab Interpretation (test code = Abnormal                           

    



             92259-3)                                            



[facility] GLUCOSE (AUTOMATED)2023 22:43:48





             Test Item    Value        Reference Range Interpretation Comments

 

             POCT GLU (test code = 4445456139) 262 mg/dL           H      

      

 

             Lab Interpretation (test code = Abnormal                           

    



             73715-7)                                            



[facility] GLUCOSE (AUTOMATED)2023 17:36:05





             Test Item    Value        Reference Range Interpretation Comments

 

             POCT GLU (test code = 3356356944) 365 mg/dL           H      

      

 

             Lab Interpretation (test code = Abnormal                           

    



             34120-7)                                            



[facility] GLUCOSE (AUTOMATED)2023 17:36:05





             Test Item    Value        Reference Range Interpretation Comments

 

             POCT GLU (test code = 5468994690) 365 mg/dL           H      

      

 

             Lab Interpretation (test code = Abnormal                           

    



             63919-8)                                            



[facility] GLUCOSE (AUTOMATED)2023 16:43:12





             Test Item    Value        Reference Range Interpretation Comments

 

             POCT GLU (test code = 2463962182) 408 mg/dL           H      

      

 

             Lab Interpretation (test code = Abnormal                           

    



             76275-1)                                            



[facility] GLUCOSE (AUTOMATED)2023 16:43:12





             Test Item    Value        Reference Range Interpretation Comments

 

             POCT GLU (test code = 2919238682) 408 mg/dL           H      

      

 

             Lab Interpretation (test code = Abnormal                           

    



             84633-5)                                            



[facility] GLUCOSE (AUTOMATED)2023 13:20:30





             Test Item    Value        Reference Range Interpretation Comments

 

             POCT GLU (test code = 0355179477) 359 mg/dL           H      

      

 

             Lab Interpretation (test code = Abnormal                           

    



             26008-6)                                            



[facility] GLUCOSE (AUTOMATED)2023 13:20:30





             Test Item    Value        Reference Range Interpretation Comments

 

             POCT GLU (test code = 5276893279) 359 mg/dL           H      

      

 

             Lab Interpretation (test code = Abnormal                           

    



             77107-6)                                            



[facility] GLUCOSE (AUTOMATED)2023 08:27:11





             Test Item    Value        Reference Range Interpretation Comments

 

             POCT GLU (test code = 2487897673) 312 mg/dL           H      

      

 

             Lab Interpretation (test code = Abnormal                           

    



             84623-6)                                            



[facility] GLUCOSE (AUTOMATED)2023 08:27:11





             Test Item    Value        Reference Range Interpretation Comments

 

             POCT GLU (test code = 4722712343) 312 mg/dL           H      

      

 

             Lab Interpretation (test code = Abnormal                           

    



             61131-6)                                            



[facility] GLUCOSE (AUTOMATED)2023 06:47:03





             Test Item    Value        Reference Range Interpretation Comments

 

             POCT GLU (test code = 7776970128) 326 mg/dL           H      

      

 

             Lab Interpretation (test code = Abnormal                           

    



             16352-5)                                            



[facility] GLUCOSE (AUTOMATED)2023 06:47:03





             Test Item    Value        Reference Range Interpretation Comments

 

             POCT GLU (test code = 1204704396) 326 mg/dL           H      

      

 

             Lab Interpretation (test code = Abnormal                           

    



             30675-1)                                            



[facility] (NA, K, CL, CO2, 
GLUCOSE, BUN, CREATININE, CA)2023 06:26:05





             Test Item    Value        Reference Range Interpretation Comments

 

             NA (test code = 132 mmol/L   135-145      L            



             8369898567)                                         

 

             K (test code = 4.4 mmol/L   3.5-5.0                   



             2754109661)                                         

 

             CL (test code = 101 mmol/L                       



             0302300469)                                         

 

             CO2 TOTAL (test code = 17 mmol/L    23-31        L            



             2094420759)                                         

 

             AGAP (test code = 14           2-16                      



             2355580294)                                         

 

             BUN (test code = 11 mg/dL     7-23                      



             1257043635)                                         

 

             GLUCOSE (test code = 346 mg/dL           H            



             4774476791)                                         

 

             CREATININE (test code = 0.54 mg/dL   0.60-1.25    L            



             3434723596)                                         

 

             CALCIUM (test code = 8.5 mg/dL    8.6-10.6     L            



             6256417713)                                         

 

             eGFR (test code = 171.2        mL/min/1.73m2              



             8395955130)                                         

 

             MAL (test code = MAL) Association of                           



                          Glomerular Filtration                           



                          Rate (GFR) and Staging                           



                          of Kidney Disease*                           



                          +---------------------                           



                          --+-------------------                           



                          --+-------------------                           



                          ------+| GFR                           



                          (mL/min/1.73 m2) ?|                           



                          With Kidney Damage ?|                           



                          ?Without Kidney                           



                          Damage+---------------                           



                          --------+-------------                           



                          --------+-------------                           



                          ------------+| ?>90 ?                           



                          ? ? ? ? ? ? ? ?|                           



                          ?Stage one ? ? ? ? ?|                           



                          ? Normal ? ? ? ? ? ? ?                           



                          ?+--------------------                           



                          ---+------------------                           



                          ---+------------------                           



                          -------+| ?60-89 ? ? ?                           



                          ? ? ? ? ?| ?Stage two                           



                          ? ? ? ? ?| ? Decreased                           



                          GFR ? ? ? ?                            



                          +---------------------                           



                          --+-------------------                           



                          --+-------------------                           



                          ------+| ?30-59 ? ? ?                           



                          ? ? ? ? ?| ?Stage                           



                          three ? ? ? ?| ? Stage                           



                          three ? ? ? ? ?                           



                          +---------------------                           



                          --+-------------------                           



                          --+-------------------                           



                          ------+| ?15-29 ? ? ?                           



                          ? ? ? ? ?| ?Stage four                           



                          ? ? ? ? | ? Stage four                           



                          ? ? ? ? ?                              



                          ?+--------------------                           



                          ---+------------------                           



                          ---+------------------                           



                          -------+| ?<15 (or                           



                          dialysis) ? ?| ?Stage                           



                          five ? ? ? ? | ? Stage                           



                          five ? ? ? ? ?                           



                          ?+--------------------                           



                          ---+------------------                           



                          ---+------------------                           



                          -------+ *Each stage                           



                          assumes the associated                           



                          GFR level has been in                           



                          effect for at least                           



                          three months. ?Stages                           



                          1 to 5, with or                           



                          without kidney                           



                          disease, indicate                           



                          chronic kidney                           



                          disease. Notes:                           



                          Determination of                           



                          stages one and two                           



                          (with eGFR                             



                          >59mL/min/1.73 m2)                           



                          requires estimation of                           



                          kidney damage for at                           



                          least three months as                           



                          defined by structural                           



                          or functional                           



                          abnormalities of the                           



                          kidney, manifested by                           



                          either:Pathological                           



                          abnormalities or                           



                          Markers of kidney                           



                          damage (including                           



                          abnormalities in the                           



                          composition of the                           



                          blood or urine or                           



                          abnormalities in                           



                          imaging tests).                           

 

             Lab Interpretation Abnormal                               



             (test code = 89539-8)                                        



[facility] METABOLIC PANEL (NA, K, CL, CO2, 
GLUCOSE, BUN, CREATININE, CA)2023 06:26:05





             Test Item    Value        Reference Range Interpretation Comments

 

             NA (test code = 132 mmol/L   135-145      L            



             4533646079)                                         

 

             K (test code = 4.4 mmol/L   3.5-5.0                   



             8739549061)                                         

 

             CL (test code = 101 mmol/L                       



             1270993607)                                         

 

             CO2 TOTAL (test code = 17 mmol/L    23-31        L            



             4912394169)                                         

 

             AGAP (test code = 14           2-16                      



             8404201894)                                         

 

             BUN (test code = 11 mg/dL     7-23                      



             1121357817)                                         

 

             GLUCOSE (test code = 346 mg/dL           H            



             6126447469)                                         

 

             CREATININE (test code = 0.54 mg/dL   0.60-1.25    L            



             5545163989)                                         

 

             CALCIUM (test code = 8.5 mg/dL    8.6-10.6     L            



             5614417902)                                         

 

             eGFR (test code = 171.2        mL/min/1.73m2              



             6737167983)                                         

 

             MAL (test code = MAL) Association of                           



                          Glomerular Filtration                           



                          Rate (GFR) and Staging                           



                          of Kidney Disease*                           



                          +---------------------                           



                          --+-------------------                           



                          --+-------------------                           



                          ------+| GFR                           



                          (mL/min/1.73 m2) ?|                           



                          With Kidney Damage ?|                           



                          ?Without Kidney                           



                          Damage+---------------                           



                          --------+-------------                           



                          --------+-------------                           



                          ------------+| ?>90 ?                           



                          ? ? ? ? ? ? ? ?|                           



                          ?Stage one ? ? ? ? ?|                           



                          ? Normal ? ? ? ? ? ? ?                           



                          ?+--------------------                           



                          ---+------------------                           



                          ---+------------------                           



                          -------+| ?60-89 ? ? ?                           



                          ? ? ? ? ?| ?Stage two                           



                          ? ? ? ? ?| ? Decreased                           



                          GFR ? ? ? ?                            



                          +---------------------                           



                          --+-------------------                           



                          --+-------------------                           



                          ------+| ?30-59 ? ? ?                           



                          ? ? ? ? ?| ?Stage                           



                          three ? ? ? ?| ? Stage                           



                          three ? ? ? ? ?                           



                          +---------------------                           



                          --+-------------------                           



                          --+-------------------                           



                          ------+| ?15-29 ? ? ?                           



                          ? ? ? ? ?| ?Stage four                           



                          ? ? ? ? | ? Stage four                           



                          ? ? ? ? ?                              



                          ?+--------------------                           



                          ---+------------------                           



                          ---+------------------                           



                          -------+| ?<15 (or                           



                          dialysis) ? ?| ?Stage                           



                          five ? ? ? ? | ? Stage                           



                          five ? ? ? ? ?                           



                          ?+--------------------                           



                          ---+------------------                           



                          ---+------------------                           



                          -------+ *Each stage                           



                          assumes the associated                           



                          GFR level has been in                           



                          effect for at least                           



                          three months. ?Stages                           



                          1 to 5, with or                           



                          without kidney                           



                          disease, indicate                           



                          chronic kidney                           



                          disease. Notes:                           



                          Determination of                           



                          stages one and two                           



                          (with eGFR                             



                          >59mL/min/1.73 m2)                           



                          requires estimation of                           



                          kidney damage for at                           



                          least three months as                           



                          defined by structural                           



                          or functional                           



                          abnormalities of the                           



                          kidney, manifested by                           



                          either:Pathological                           



                          abnormalities or                           



                          Markers of kidney                           



                          damage (including                           



                          abnormalities in the                           



                          composition of the                           



                          blood or urine or                           



                          abnormalities in                           



                          imaging tests).                           

 

             Lab Interpretation Abnormal                               



             (test code = 48485-1)                                        



[facility] GLUCOSE (AUTOMATED)2023 05:38:22





             Test Item    Value        Reference Range Interpretation Comments

 

             POCT GLU (test code = 6604127068) 414 mg/dL           H      

      

 

             Lab Interpretation (test code = Abnormal                           

    



             13217-4)                                            



[facility] GLUCOSE (AUTOMATED)2023 05:38:22





             Test Item    Value        Reference Range Interpretation Comments

 

             POCT GLU (test code = 1782328336) 414 mg/dL           H      

      

 

             Lab Interpretation (test code = Abnormal                           

    



             63258-0)                                            



[facility] METABOLIC PANEL (NA, K, CL, CO2, 
GLUCOSE, BUN, CREATININE, CA)2023 03:58:07





             Test Item    Value        Reference Range Interpretation Comments

 

             NA (test code = 133 mmol/L   135-145      L            



             8012820844)                                         

 

             K (test code = 4.7 mmol/L   3.5-5.0                   



             1984939800)                                         

 

             CL (test code = 100 mmol/L                       



             0354167060)                                         

 

             CO2 TOTAL (test code = 16 mmol/L    23-31        L            



             7522456192)                                         

 

             AGAP (test code = 17           2-16         H            



             3076991113)                                         

 

             BUN (test code = 12 mg/dL     7-23                      



             4247572300)                                         

 

             GLUCOSE (test code = 407 mg/dL           H            



             0462784299)                                         

 

             CREATININE (test code = 0.55 mg/dL   0.60-1.25    L            



             2404514368)                                         

 

             CALCIUM (test code = 9.2 mg/dL    8.6-10.6                  



             6864338926)                                         

 

             eGFR (test code = 167.6        mL/min/1.73m2              



             0243379307)                                         

 

             MAL (test code = MAL) Association of                           



                          Glomerular Filtration                           



                          Rate (GFR) and Staging                           



                          of Kidney Disease*                           



                          +---------------------                           



                          --+-------------------                           



                          --+-------------------                           



                          ------+| GFR                           



                          (mL/min/1.73 m2) ?|                           



                          With Kidney Damage ?|                           



                          ?Without Kidney                           



                          Damage+---------------                           



                          --------+-------------                           



                          --------+-------------                           



                          ------------+| ?>90 ?                           



                          ? ? ? ? ? ? ? ?|                           



                          ?Stage one ? ? ? ? ?|                           



                          ? Normal ? ? ? ? ? ? ?                           



                          ?+--------------------                           



                          ---+------------------                           



                          ---+------------------                           



                          -------+| ?60-89 ? ? ?                           



                          ? ? ? ? ?| ?Stage two                           



                          ? ? ? ? ?| ? Decreased                           



                          GFR ? ? ? ?                            



                          +---------------------                           



                          --+-------------------                           



                          --+-------------------                           



                          ------+| ?30-59 ? ? ?                           



                          ? ? ? ? ?| ?Stage                           



                          three ? ? ? ?| ? Stage                           



                          three ? ? ? ? ?                           



                          +---------------------                           



                          --+-------------------                           



                          --+-------------------                           



                          ------+| ?15-29 ? ? ?                           



                          ? ? ? ? ?| ?Stage four                           



                          ? ? ? ? | ? Stage four                           



                          ? ? ? ? ?                              



                          ?+--------------------                           



                          ---+------------------                           



                          ---+------------------                           



                          -------+| ?<15 (or                           



                          dialysis) ? ?| ?Stage                           



                          five ? ? ? ? | ? Stage                           



                          five ? ? ? ? ?                           



                          ?+--------------------                           



                          ---+------------------                           



                          ---+------------------                           



                          -------+ *Each stage                           



                          assumes the associated                           



                          GFR level has been in                           



                          effect for at least                           



                          three months. ?Stages                           



                          1 to 5, with or                           



                          without kidney                           



                          disease, indicate                           



                          chronic kidney                           



                          disease. Notes:                           



                          Determination of                           



                          stages one and two                           



                          (with eGFR                             



                          >59mL/min/1.73 m2)                           



                          requires estimation of                           



                          kidney damage for at                           



                          least three months as                           



                          defined by structural                           



                          or functional                           



                          abnormalities of the                           



                          kidney, manifested by                           



                          either:Pathological                           



                          abnormalities or                           



                          Markers of kidney                           



                          damage (including                           



                          abnormalities in the                           



                          composition of the                           



                          blood or urine or                           



                          abnormalities in                           



                          imaging tests).                           

 

             Lab Interpretation Abnormal                               



             (test code = 74824-8)                                        



[facility] METABOLIC PANEL (NA, K, CL, CO2, 
GLUCOSE, BUN, CREATININE, CA)2023 03:58:07





             Test Item    Value        Reference Range Interpretation Comments

 

             NA (test code = 133 mmol/L   135-145      L            



             5787079771)                                         

 

             K (test code = 4.7 mmol/L   3.5-5.0                   



             3139658988)                                         

 

             CL (test code = 100 mmol/L                       



             9930233991)                                         

 

             CO2 TOTAL (test code = 16 mmol/L    23-31        L            



             4001221786)                                         

 

             AGAP (test code = 17           2-16         H            



             1033638583)                                         

 

             BUN (test code = 12 mg/dL     7-23                      



             2810171677)                                         

 

             GLUCOSE (test code = 407 mg/dL           H            



             6392224521)                                         

 

             CREATININE (test code = 0.55 mg/dL   0.60-1.25    L            



             1484984477)                                         

 

             CALCIUM (test code = 9.2 mg/dL    8.6-10.6                  



             2676864599)                                         

 

             eGFR (test code = 167.6        mL/min/1.73m2              



             2586054628)                                         

 

             MAL (test code = MAL) Association of                           



                          Glomerular Filtration                           



                          Rate (GFR) and Staging                           



                          of Kidney Disease*                           



                          +---------------------                           



                          --+-------------------                           



                          --+-------------------                           



                          ------+| GFR                           



                          (mL/min/1.73 m2) ?|                           



                          With Kidney Damage ?|                           



                          ?Without Kidney                           



                          Damage+---------------                           



                          --------+-------------                           



                          --------+-------------                           



                          ------------+| ?>90 ?                           



                          ? ? ? ? ? ? ? ?|                           



                          ?Stage one ? ? ? ? ?|                           



                          ? Normal ? ? ? ? ? ? ?                           



                          ?+--------------------                           



                          ---+------------------                           



                          ---+------------------                           



                          -------+| ?60-89 ? ? ?                           



                          ? ? ? ? ?| ?Stage two                           



                          ? ? ? ? ?| ? Decreased                           



                          GFR ? ? ? ?                            



                          +---------------------                           



                          --+-------------------                           



                          --+-------------------                           



                          ------+| ?30-59 ? ? ?                           



                          ? ? ? ? ?| ?Stage                           



                          three ? ? ? ?| ? Stage                           



                          three ? ? ? ? ?                           



                          +---------------------                           



                          --+-------------------                           



                          --+-------------------                           



                          ------+| ?15-29 ? ? ?                           



                          ? ? ? ? ?| ?Stage four                           



                          ? ? ? ? | ? Stage four                           



                          ? ? ? ? ?                              



                          ?+--------------------                           



                          ---+------------------                           



                          ---+------------------                           



                          -------+| ?<15 (or                           



                          dialysis) ? ?| ?Stage                           



                          five ? ? ? ? | ? Stage                           



                          five ? ? ? ? ?                           



                          ?+--------------------                           



                          ---+------------------                           



                          ---+------------------                           



                          -------+ *Each stage                           



                          assumes the associated                           



                          GFR level has been in                           



                          effect for at least                           



                          three months. ?Stages                           



                          1 to 5, with or                           



                          without kidney                           



                          disease, indicate                           



                          chronic kidney                           



                          disease. Notes:                           



                          Determination of                           



                          stages one and two                           



                          (with eGFR                             



                          >59mL/min/1.73 m2)                           



                          requires estimation of                           



                          kidney damage for at                           



                          least three months as                           



                          defined by structural                           



                          or functional                           



                          abnormalities of the                           



                          kidney, manifested by                           



                          either:Pathological                           



                          abnormalities or                           



                          Markers of kidney                           



                          damage (including                           



                          abnormalities in the                           



                          composition of the                           



                          blood or urine or                           



                          abnormalities in                           



                          imaging tests).                           

 

             Lab Interpretation Abnormal                               



             (test code = 02277-5)                                        



[facility] WITH [id number] 03:21:04





             Test Item    Value        Reference Range Interpretation Comments

 

             WBC (test code = 12.21        See_Comment  H             [Automated



             2690-2)                                             message] The sy

stem



                                                                 which generated



                                                                 this result



                                                                 transmitted



                                                                 reference range

:



                                                                 4.20 - 10.70



                                                                 10*3/?L. The



                                                                 reference range

 was



                                                                 not used to



                                                                 interpret this



                                                                 result as



                                                                 normal/abnormal

.

 

             RBC (test code = 5.59         See_Comment  H             [Automated



             910-8)                                              message] The sy

stem



                                                                 which generated



                                                                 this result



                                                                 transmitted



                                                                 reference range

:



                                                                 4.26 - 5.52



                                                                 10*6/?L. The



                                                                 reference range

 was



                                                                 not used to



                                                                 interpret this



                                                                 result as



                                                                 normal/abnormal

.

 

             HGB (test code = 16.3 g/dL    12.2-16.4                 



             718-7)                                              

 

             HCT (test code = 47.2 %       38.4-49.3                 



             4544-3)                                             

 

             MCV (test code = 84.4 fL      81.7-95.6                 



             787-2)                                              

 

             MCH (test code = 29.2 pg      26.1-32.7                 



             785-6)                                              

 

             MCHC (test code = 34.5 g/dL    31.2-35.0                 



             786-4)                                              

 

             RDW-SD (test code = 45.5 fL      38.5-51.6                 



             19120-0)                                            

 

             RDW-CV (test code = 15.1 %       12.1-15.4                 



             788-0)                                              

 

             PLT (test code = 344          See_Comment  H             [Automated



             777-3)                                              message] The sy

stem



                                                                 which generated



                                                                 this result



                                                                 transmitted



                                                                 reference range

:



                                                                 150 - 328 10*3/

?L.



                                                                 The reference r

zhen



                                                                 was not used to



                                                                 interpret this



                                                                 result as



                                                                 normal/abnormal

.

 

             MPV (test code = 10.5 fL      9.8-13.0                  



             27522-7)                                            

 

             NRBC/100 WBC (test 0.0          See_Comment                [Automat

ed



             code = [id number])                                        message] 

The system



                                                                 which generated



                                                                 this result



                                                                 transmitted



                                                                 reference range

:



                                                                 0.0 - 10.0 /100



                                                                 WBCs. The refer

ence



                                                                 range was not u

sed



                                                                 to interpret th

is



                                                                 result as



                                                                 normal/abnormal

.

 

             NRBC x10^3 (test code              See_Comment                [Auto

mated



             = 0891110176)                                        message] The s

ystem



                                                                 which generated



                                                                 this result



                                                                 transmitted



                                                                 reference range

:



                                                                 10*3/?L. The



                                                                 reference range

 was



                                                                 not used to



                                                                 interpret this



                                                                 result as



                                                                 normal/abnormal

.

 

             GRAN MAT (NEUT) % 73.0 %                                 



             (test code = 770-8)                                        

 

             IMM GRAN % (test code 0.70 %                                 



             = 6728006914)                                        

 

             LYMPH % (test code = 19.6 %                                 



             736-9)                                              

 

             MONO % (test code = 6.1 %                                  



             5905-5)                                             

 

             EOS % (test code = 0.3 %                                  



             713-8)                                              

 

             BASO % (test code = 0.3 %                                  



             706-2)                                              

 

             GRAN MAT x10^3(ANC) 8.91 10*3/uL 1.99-6.95    H            



             (test code =                                        



             9021189882)                                         

 

             IMM GRAN x10^3 (test 0.09 10*3/uL 0.00-0.06    H            



             code = 6591888269)                                        

 

             LYMPH x10^3 (test code 2.39 10*3/uL 1.09-3.23                 



             = 731-0)                                            

 

             MONO x10^3 (test code 0.74 10*3/uL 0.36-1.02                 



             = 742-7)                                            

 

             EOS x10^3 (test code = 0.04 10*3/uL 0.06-0.53    L            



             711-2)                                              

 

             BASO x10^3 (test code 0.04 10*3/uL 0.01-0.09                 



             = 704-7)                                            

 

             Lab Interpretation Abnormal                               



             (test code = 13937-1)                                        



[facility] WITH [id number] 03:21:04





             Test Item    Value        Reference Range Interpretation Comments

 

             WBC (test code = 12.21        See_Comment  H             [Automated



             6690-2)                                             message] The sy

stem



                                                                 which generated



                                                                 this result



                                                                 transmitted



                                                                 reference range

:



                                                                 4.20 - 10.70



                                                                 10*3/?L. The



                                                                 reference range

 was



                                                                 not used to



                                                                 interpret this



                                                                 result as



                                                                 normal/abnormal

.

 

             RBC (test code = 5.59         See_Comment  H             [Automated



             789-8)                                              message] The sy

stem



                                                                 which generated



                                                                 this result



                                                                 transmitted



                                                                 reference range

:



                                                                 4.26 - 5.52



                                                                 10*6/?L. The



                                                                 reference range

 was



                                                                 not used to



                                                                 interpret this



                                                                 result as



                                                                 normal/abnormal

.

 

             HGB (test code = 16.3 g/dL    12.2-16.4                 



             718-7)                                              

 

             HCT (test code = 47.2 %       38.4-49.3                 



             4544-3)                                             

 

             MCV (test code = 84.4 fL      81.7-95.6                 



             787-2)                                              

 

             MCH (test code = 29.2 pg      26.1-32.7                 



             785-6)                                              

 

             MCHC (test code = 34.5 g/dL    31.2-35.0                 



             786-4)                                              

 

             RDW-SD (test code = 45.5 fL      38.5-51.6                 



             43902-3)                                            

 

             RDW-CV (test code = 15.1 %       12.1-15.4                 



             788-0)                                              

 

             PLT (test code = 344          See_Comment  H             [Automated



             777-3)                                              message] The sy

stem



                                                                 which generated



                                                                 this result



                                                                 transmitted



                                                                 reference range

:



                                                                 150 - 328 10*3/

?L.



                                                                 The reference r

zhen



                                                                 was not used to



                                                                 interpret this



                                                                 result as



                                                                 normal/abnormal

.

 

             MPV (test code = 10.5 fL      9.8-13.0                  



             72329-9)                                            

 

             NRBC/100 WBC (test 0.0          See_Comment                [Automat

ed



             code = 2302932523)                                        message] 

The system



                                                                 which generated



                                                                 this result



                                                                 transmitted



                                                                 reference range

:



                                                                 0.0 - 10.0 /100



                                                                 WBCs. The refer

ence



                                                                 range was not u

sed



                                                                 to interpret th

is



                                                                 result as



                                                                 normal/abnormal

.

 

             NRBC x10^3 (test code              See_Comment                [Auto

mated



             = [id number])                                        message] The s

ystem



                                                                 which generated



                                                                 this result



                                                                 transmitted



                                                                 reference range

:



                                                                 10*3/?L. The



                                                                 reference range

 was



                                                                 not used to



                                                                 interpret this



                                                                 result as



                                                                 normal/abnormal

.

 

             GRAN MAT (NEUT) % 73.0 %                                 



             (test code = 770-8)                                        

 

             IMM GRAN % (test code 0.70 %                                 



             = 1977553094)                                        

 

             LYMPH % (test code = 19.6 %                                 



             736-9)                                              

 

             MONO % (test code = 6.1 %                                  



             5905-5)                                             

 

             EOS % (test code = 0.3 %                                  



             713-8)                                              

 

             BASO % (test code = 0.3 %                                  



             706-2)                                              

 

             GRAN MAT x10^3(ANC) 8.91 10*3/uL 1.99-6.95    H            



             (test code =                                        



             0765234269)                                         

 

             IMM GRAN x10^3 (test 0.09 10*3/uL 0.00-0.06    H            



             code = 0310284484)                                        

 

             LYMPH x10^3 (test code 2.39 10*3/uL 1.09-3.23                 



             = 731-0)                                            

 

             MONO x10^3 (test code 0.74 10*3/uL 0.36-1.02                 



             = 742-7)                                            

 

             EOS x10^3 (test code = 0.04 10*3/uL 0.06-0.53    L            



             711-2)                                              

 

             BASO x10^3 (test code 0.04 10*3/uL 0.01-0.09                 



             = 704-7)                                            

 

             Lab Interpretation Abnormal                               



             (test code = 65002-5)                                        



[facility] GLUCOSE (AUTOMATED)2023 02:39:28





             Test Item    Value        Reference Range Interpretation Comments

 

             POCT GLU (test code = 0232564177) 438 mg/dL           H      

      

 

             Lab Interpretation (test code = Abnormal                           

    



             96824-6)                                            



[facility]. METABOLIC PANEL (22655)2023 
01:00:03





             Test Item    Value        Reference Range Interpretation Comments

 

             NA (test code = 136 mmol/L   135-145                   



             6232729818)                                         

 

             K (test code = 3.8 mmol/L   3.5-5.0                   



             6033686292)                                         

 

             CL (test code = 104 mmol/L                       



             3955700519)                                         

 

             CO2 TOTAL (test code = 23 mmol/L    23-31                     



             0455770202)                                         

 

             AGAP (test code = 9            2-16                      



             0303262732)                                         

 

             BUN (test code = 6 mg/dL      7-23         L            



             4557538880)                                         

 

             GLUCOSE (test code = 645 mg/dL           HH           



             7318500250)                                         

 

             CREATININE (test code = 0.68 mg/dL   0.60-1.25                 



             8593166081)                                         

 

             TOTAL BILI (test code = 1.0 mg/dL    0.1-1.1                   



             9503053786)                                         

 

             CALCIUM (test code = 8.5 mg/dL    8.6-10.6     L            



             0440916616)                                         

 

             T PROTEIN (test code = 6.8 g/dL     6.3-8.2                   



             3505148959)                                         

 

             ALBUMIN (test code = 3.5 g/dL     3.5-5.0                   



             5069420899)                                         

 

             ALK PHOS (test code = 201 U/L             H            



             1673374745)                                         

 

             ALTv (test code = 60 U/L       5-50         H            



             1742-6)                                             

 

             AST(SGOT) (test code = 23 U/L       13-40                     



             6223877120)                                         

 

             eGFR (test code = 131.2        mL/min/1.73m2              



             8971233388)                                         

 

             MAL (test code = MAL) Association of                           



                          Glomerular Filtration                           



                          Rate (GFR) and Staging                           



                          of Kidney Disease*                           



                          +---------------------                           



                          --+-------------------                           



                          --+-------------------                           



                          ------+| GFR                           



                          (mL/min/1.73 m2) ?|                           



                          With Kidney Damage ?|                           



                          ?Without Kidney                           



                          Damage+---------------                           



                          --------+-------------                           



                          --------+-------------                           



                          ------------+| ?>90 ?                           



                          ? ? ? ? ? ? ? ?|                           



                          ?Stage one ? ? ? ? ?|                           



                          ? Normal ? ? ? ? ? ? ?                           



                          ?+--------------------                           



                          ---+------------------                           



                          ---+------------------                           



                          -------+| ?60-89 ? ? ?                           



                          ? ? ? ? ?| ?Stage two                           



                          ? ? ? ? ?| ? Decreased                           



                          GFR ? ? ? ?                            



                          +---------------------                           



                          --+-------------------                           



                          --+-------------------                           



                          ------+| ?30-59 ? ? ?                           



                          ? ? ? ? ?| ?Stage                           



                          three ? ? ? ?| ? Stage                           



                          three ? ? ? ? ?                           



                          +---------------------                           



                          --+-------------------                           



                          --+-------------------                           



                          ------+| ?15-29 ? ? ?                           



                          ? ? ? ? ?| ?Stage four                           



                          ? ? ? ? | ? Stage four                           



                          ? ? ? ? ?                              



                          ?+--------------------                           



                          ---+------------------                           



                          ---+------------------                           



                          -------+| ?<15 (or                           



                          dialysis) ? ?| ?Stage                           



                          five ? ? ? ? | ? Stage                           



                          five ? ? ? ? ?                           



                          ?+--------------------                           



                          ---+------------------                           



                          ---+------------------                           



                          -------+ *Each stage                           



                          assumes the associated                           



                          GFR level has been in                           



                          effect for at least                           



                          three months. ?Stages                           



                          1 to 5, with or                           



                          without kidney                           



                          disease, indicate                           



                          chronic kidney                           



                          disease. Notes:                           



                          Determination of                           



                          stages one and two                           



                          (with eGFR                             



                          >59mL/min/1.73 m2)                           



                          requires estimation of                           



                          kidney damage for at                           



                          least three months as                           



                          defined by structural                           



                          or functional                           



                          abnormalities of the                           



                          kidney, manifested by                           



                          either:Pathological                           



                          abnormalities or                           



                          Markers of kidney                           



                          damage (including                           



                          abnormalities in the                           



                          composition of the                           



                          blood or urine or                           



                          abnormalities in                           



                          imaging tests).                           

 

             Lab Interpretation Abnormal                               



             (test code = 66218-6)                                        



[facility]





             Test Item    Value        Reference Range Interpretation Comments

 

             LIPASE (test code = 3209187306) 141 U/L      0-220                 

    

 

             Lab Interpretation (test code = Normal                             

    



             35622-4)                                            



[id number] 00:01:30





             Test Item    Value        Reference Range Interpretation Comments

 

             WBC (test code = 8.06         See_Comment                [Automated



             1690-2)                                             message] The sy

stem



                                                                 which generated



                                                                 this result



                                                                 transmitted



                                                                 reference range

:



                                                                 4.20 - 10.70



                                                                 10*3/?L. The



                                                                 reference range

 was



                                                                 not used to



                                                                 interpret this



                                                                 result as



                                                                 normal/abnormal

.

 

             RBC (test code = 5.04         See_Comment                [Automated



             109-8)                                              message] The sy

stem



                                                                 which generated



                                                                 this result



                                                                 transmitted



                                                                 reference range

:



                                                                 4.26 - 5.52



                                                                 10*6/?L. The



                                                                 reference range

 was



                                                                 not used to



                                                                 interpret this



                                                                 result as



                                                                 normal/abnormal

.

 

             HGB (test code = 14.4 g/dL    12.2-16.4                 



             718-7)                                              

 

             HCT (test code = 43.1 %       38.4-49.3                 



             4544-3)                                             

 

             MCV (test code = 85.5 fL      81.7-95.6                 



             787-2)                                              

 

             MCH (test code = 28.6 pg      26.1-32.7                 



             785-6)                                              

 

             MCHC (test code = 33.4 g/dL    31.2-35.0                 



             786-4)                                              

 

             RDW-SD (test code = 46.7 fL      38.5-51.6                 



             75232-5)                                            

 

             RDW-CV (test code = 15.5 %       12.1-15.4    H            



             788-0)                                              

 

             PLT (test code = 386          See_Comment  H             [Automated



             777-3)                                              message] The sy

stem



                                                                 which generated



                                                                 this result



                                                                 transmitted



                                                                 reference range

:



                                                                 150 - 328 10*3/

?L.



                                                                 The reference r

zhen



                                                                 was not used to



                                                                 interpret this



                                                                 result as



                                                                 normal/abnormal

.

 

             MPV (test code = 10.7 fL      9.8-13.0                  



             57916-0)                                            

 

             NRBC/100 WBC (test 0.0          See_Comment                [Automat

ed



             code = 2797000966)                                        message] 

The system



                                                                 which generated



                                                                 this result



                                                                 transmitted



                                                                 reference range

:



                                                                 0.0 - 10.0 /100



                                                                 WBCs. The refer

ence



                                                                 range was not u

sed



                                                                 to interpret th

is



                                                                 result as



                                                                 normal/abnormal

.

 

             NRBC x10^3 (test code              See_Comment                [Auto

mated



             = [id number])                                        message] The s

ystem



                                                                 which generated



                                                                 this result



                                                                 transmitted



                                                                 reference range

:



                                                                 10*3/?L. The



                                                                 reference range

 was



                                                                 not used to



                                                                 interpret this



                                                                 result as



                                                                 normal/abnormal

.

 

             GRAN MAT (NEUT) % 72.5 %                                 



             (test code = 770-8)                                        

 

             IMM GRAN % (test code 0.40 %                                 



             = 7864515107)                                        

 

             LYMPH % (test code = 15.0 %                                 



             736-9)                                              

 

             MONO % (test code = 9.1 %                                  



             5905-5)                                             

 

             EOS % (test code = 2.5 %                                  



             713-8)                                              

 

             BASO % (test code = 0.5 %                                  



             706-2)                                              

 

             GRAN MAT x10^3(ANC) 5.85 10*3/uL 1.99-6.95                 



             (test code =                                        



             8680894071)                                         

 

             IMM GRAN x10^3 (test 0.03 10*3/uL 0.00-0.06                 



             code = 1740645283)                                        

 

             LYMPH x10^3 (test code 1.21 10*3/uL 1.09-3.23                 



             = 731-0)                                            

 

             MONO x10^3 (test code 0.73 10*3/uL 0.36-1.02                 



             = 742-7)                                            

 

             EOS x10^3 (test code = 0.20 10*3/uL 0.06-0.53                 



             711-2)                                              

 

             BASO x10^3 (test code 0.04 10*3/uL 0.01-0.09                 



             = 704-7)                                            

 

             Lab Interpretation Abnormal                               



             (test code = 72103-4)                                        



[facility] GLUCOSE (AUTOMATED)2023 23:09:42





             Test Item    Value        Reference Range Interpretation Comments

 

             POCT GLU (test code = 6075356234) 367 mg/dL           H      

      

 

             Lab Interpretation (test code = Abnormal                           

    



             29906-0)                                            



[facility]13[facility] 
22:58:25





             Test Item    Value        Reference Range Interpretation Comments

 

             NA (test code = 143 mmol/L   135-145                   



             3723273220)                                         

 

             K (test code = 4.2 mmol/L   3.5-5.0                   



             6022031628)                                         

 

             CL (test code = 106 mmol/L                       



             3429660875)                                         

 

             CO2 TOTAL (test code = 18 mmol/L    23-31        L            



             4357621989)                                         

 

             AGAP (test code = 19           2-16         H            



             0432557517)                                         

 

             BUN (test code = 7 mg/dL      7-23                      



             0960619557)                                         

 

             GLUCOSE (test code = 542 mg/dL           HH           



             5261923380)                                         

 

             CREATININE (test code = 0.67 mg/dL   0.60-1.25                 



             9231836025)                                         

 

             TOTAL BILI (test code = 1.5 mg/dL    0.1-1.1      H            



             6329759566)                                         

 

             CALCIUM (test code = 9.5 mg/dL    8.6-10.6                  



             0220777296)                                         

 

             T PROTEIN (test code = 7.9 g/dL     6.3-8.2                   



             9481978869)                                         

 

             ALBUMIN (test code = 4.2 g/dL     3.5-5.0                   



             3815274260)                                         

 

             ALK PHOS (test code = 233 U/L             H            



             4571609208)                                         

 

             ALTv (test code = 86 U/L       5-50         H            



             1742-6)                                             

 

             AST(SGOT) (test code = 25 U/L       13-40                     



             6884710218)                                         

 

             eGFR (test code = 133.5        mL/min/1.73m2              



             2554182769)                                         

 

             MAL (test code = MAL) Association of                           



                          Glomerular Filtration                           



                          Rate (GFR) and Staging                           



                          of Kidney Disease*                           



                          +---------------------                           



                          --+-------------------                           



                          --+-------------------                           



                          ------+| GFR                           



                          (mL/min/1.73 m2) ?|                           



                          With Kidney Damage ?|                           



                          ?Without Kidney                           



                          Damage+---------------                           



                          --------+-------------                           



                          --------+-------------                           



                          ------------+| ?>90 ?                           



                          ? ? ? ? ? ? ? ?|                           



                          ?Stage one ? ? ? ? ?|                           



                          ? Normal ? ? ? ? ? ? ?                           



                          ?+--------------------                           



                          ---+------------------                           



                          ---+------------------                           



                          -------+| ?60-89 ? ? ?                           



                          ? ? ? ? ?| ?Stage two                           



                          ? ? ? ? ?| ? Decreased                           



                          GFR ? ? ? ?                            



                          +---------------------                           



                          --+-------------------                           



                          --+-------------------                           



                          ------+| ?30-59 ? ? ?                           



                          ? ? ? ? ?| ?Stage                           



                          three ? ? ? ?| ? Stage                           



                          three ? ? ? ? ?                           



                          +---------------------                           



                          --+-------------------                           



                          --+-------------------                           



                          ------+| ?15-29 ? ? ?                           



                          ? ? ? ? ?| ?Stage four                           



                          ? ? ? ? | ? Stage four                           



                          ? ? ? ? ?                              



                          ?+--------------------                           



                          ---+------------------                           



                          ---+------------------                           



                          -------+| ?<15 (or                           



                          dialysis) ? ?| ?Stage                           



                          five ? ? ? ? | ? Stage                           



                          five ? ? ? ? ?                           



                          ?+--------------------                           



                          ---+------------------                           



                          ---+------------------                           



                          -------+ *Each stage                           



                          assumes the associated                           



                          GFR level has been in                           



                          effect for at least                           



                          three months. ?Stages                           



                          1 to 5, with or                           



                          without kidney                           



                          disease, indicate                           



                          chronic kidney                           



                          disease. Notes:                           



                          Determination of                           



                          stages one and two                           



                          (with eGFR                             



                          >59mL/min/1.73 m2)                           



                          requires estimation of                           



                          kidney damage for at                           



                          least three months as                           



                          defined by structural                           



                          or functional                           



                          abnormalities of the                           



                          kidney, manifested by                           



                          either:Pathological                           



                          abnormalities or                           



                          Markers of kidney                           



                          damage (including                           



                          abnormalities in the                           



                          composition of the                           



                          blood or urine or                           



                          abnormalities in                           



                          imaging tests).                           

 

             Lab Interpretation Abnormal                               



             (test code = 46361-3)                                        



[facility]





             Test Item    Value        Reference Range Interpretation Comments

 

             MAGNESIUM (test code = 5200932251) 1.7 mg/dL    1.7-2.4            

       

 

             Lab Interpretation (test code = Normal                             

    



             68729-8)                                            



[facility] WITH [id number] 22:28:16





             Test Item    Value        Reference Range Interpretation Comments

 

             WBC (test code = 8.36         See_Comment                [Automated



             9954-2)                                             message] The sy

stem



                                                                 which generated



                                                                 this result



                                                                 transmitted



                                                                 reference range

:



                                                                 4.20 - 10.70



                                                                 10*3/?L. The



                                                                 reference range

 was



                                                                 not used to



                                                                 interpret this



                                                                 result as



                                                                 normal/abnormal

.

 

             RBC (test code = 5.74         See_Comment  H             [Automated



             308-8)                                              message] The sy

stem



                                                                 which generated



                                                                 this result



                                                                 transmitted



                                                                 reference range

:



                                                                 4.26 - 5.52



                                                                 10*6/?L. The



                                                                 reference range

 was



                                                                 not used to



                                                                 interpret this



                                                                 result as



                                                                 normal/abnormal

.

 

             HGB (test code = 16.5 g/dL    12.2-16.4    H            



             718-7)                                              

 

             HCT (test code = 48.5 %       38.4-49.3                 



             4544-3)                                             

 

             MCV (test code = 84.5 fL      81.7-95.6                 



             787-2)                                              

 

             MCH (test code = 28.7 pg      26.1-32.7                 



             785-6)                                              

 

             MCHC (test code = 34.0 g/dL    31.2-35.0                 



             786-4)                                              

 

             RDW-SD (test code = 45.1 fL      38.5-51.6                 



             37675-0)                                            

 

             RDW-CV (test code = 15.8 %       12.1-15.4    H            



             788-0)                                              

 

             PLT (test code = 475          See_Comment  H             [Automated



             777-3)                                              message] The sy

stem



                                                                 which generated



                                                                 this result



                                                                 transmitted



                                                                 reference range

:



                                                                 150 - 328 10*3/

?L.



                                                                 The reference r

zhen



                                                                 was not used to



                                                                 interpret this



                                                                 result as



                                                                 normal/abnormal

.

 

             MPV (test code = 10.7 fL      9.8-13.0                  



             39451-9)                                            

 

             NRBC/100 WBC (test 0.0          See_Comment                [Automat

ed



             code = 2972819129)                                        message] 

The system



                                                                 which generated



                                                                 this result



                                                                 transmitted



                                                                 reference range

:



                                                                 0.0 - 10.0 /100



                                                                 WBCs. The refer

ence



                                                                 range was not u

sed



                                                                 to interpret th

is



                                                                 result as



                                                                 normal/abnormal

.

 

             NRBC x10^3 (test code              See_Comment                [Auto

mated



             = 6188432310)                                        message] The s

ystem



                                                                 which generated



                                                                 this result



                                                                 transmitted



                                                                 reference range

:



                                                                 10*3/?L. The



                                                                 reference range

 was



                                                                 not used to



                                                                 interpret this



                                                                 result as



                                                                 normal/abnormal

.

 

             GRAN MAT (NEUT) % 70.8 %                                 



             (test code = 770-8)                                        

 

             IMM GRAN % (test code 1.00 %                                 



             = 6653402567)                                        

 

             LYMPH % (test code = 18.3 %                                 



             736-9)                                              

 

             MONO % (test code = 7.7 %                                  



             5905-5)                                             

 

             EOS % (test code = 1.8 %                                  



             713-8)                                              

 

             BASO % (test code = 0.4 %                                  



             706-2)                                              

 

             GRAN MAT x10^3(ANC) 5.93 10*3/uL 1.99-6.95                 



             (test code =                                        



             7067219948)                                         

 

             IMM GRAN x10^3 (test 0.08 10*3/uL 0.00-0.06    H            



             code = 9751841147)                                        

 

             LYMPH x10^3 (test code 1.53 10*3/uL 1.09-3.23                 



             = 731-0)                                            

 

             MONO x10^3 (test code 0.64 10*3/uL 0.36-1.02                 



             = 742-7)                                            

 

             EOS x10^3 (test code = 0.15 10*3/uL 0.06-0.53                 



             711-2)                                              

 

             BASO x10^3 (test code 0.03 10*3/uL 0.01-0.09                 



             = 704-7)                                            

 

             Lab Interpretation Abnormal                               



             (test code = 39769-1)                                        



[facility] GLUCOSE (AUTOMATED)2023 22:22:13





             Test Item    Value        Reference Range Interpretation Comments

 

             POCT GLU (test code = 4339143586) 515 mg/dL           HH     

      

 

             Lab Interpretation (test code = Abnormal                           

    



             11092-0)                                            



[facility] GLUCOSE (AUTOMATED)2023 21:18:09





             Test Item    Value        Reference Range Interpretation Comments

 

             POCT GLU (test code = 9011313950) 557 mg/dL           HH     

      

 

             Lab Interpretation (test code = Abnormal                           

    



             87548-5)                                            



[facility] GLUCOSE (AUTOMATED)2023 23:58:14





             Test Item    Value        Reference Range Interpretation Comments

 

             POCT GLU (test code = 0813741983) 235 mg/dL           H      

      

 

             Lab Interpretation (test code = Abnormal                           

    



             99190-2)                                            



[facility] GLUCOSE (AUTOMATED)2023 17:36:06





             Test Item    Value        Reference Range Interpretation Comments

 

             POCT GLU (test code = 2320668980) 276 mg/dL           H      

      

 

             Lab Interpretation (test code = Abnormal                           

    



             82398-4)                                            



[facility] GLUCOSE (AUTOMATED)2023 14:01:35





             Test Item    Value        Reference Range Interpretation Comments

 

             POCT GLU (test code = 0989540570) 306 mg/dL           H      

      

 

             Lab Interpretation (test code = Abnormal                           

    



             45702-3)                                            



[facility] GLUCOSE (AUTOMATED)2023 14:01:35





             Test Item    Value        Reference Range Interpretation Comments

 

             POCT GLU (test code = 6018912463) 321 mg/dL           H      

      

 

             Lab Interpretation (test code = Abnormal                           

    



             87268-0)                                            



[facility] GLUCOSE (AUTOMATED)2023 02:22:30





             Test Item    Value        Reference Range Interpretation Comments

 

             POCT GLU (test code = 2171009743) 323 mg/dL           H      

      

 

             Lab Interpretation (test code = Abnormal                           

    



             17744-4)                                            



[facility] GLUCOSE (AUTOMATED)2023 01:14:46





             Test Item    Value        Reference Range Interpretation Comments

 

             POCT GLU (test code = 6294088846) 311 mg/dL           H      

      

 

             Lab Interpretation (test code = Abnormal                           

    



             28492-7)                                            



[facility] GLUCOSE (AUTOMATED)2023 23:47:24





             Test Item    Value        Reference Range Interpretation Comments

 

             POCT GLU (test code = 4199340903) 354 mg/dL           H      

      

 

             Lab Interpretation (test code = Abnormal                           

    



             46336-0)                                            



[facility] GLUCOSE (AUTOMATED)2023 22:23:59





             Test Item    Value        Reference Range Interpretation Comments

 

             POCT GLU (test code = 6859259650) 398 mg/dL           H      

      

 

             Lab Interpretation (test code = Abnormal                           

    



             19083-8)                                            



[facility] HEMOGLOBIN A1C [id number] 19:04:00





             Test Item    Value        Reference Range Interpretation Comments

 

             POCT HBA1C (test code = 4548-4) 6.8 %        4-6          A        

    

 

             Lab Interpretation (test code = Abnormal                           

    



             31583-7)                                            



[facility] HEMOGLOBIN A1C [id number] 19:04:00





             Test Item    Value        Reference Range Interpretation Comments

 

             POCT HBA1C (test code = 4548-4) 6.8 %        4-6          A        

    

 

             Lab Interpretation (test code = Abnormal                           

    



             76030-3)                                            



[facility] HEMOGLOBIN A1C [id number] 19:04:00





             Test Item    Value        Reference Range Interpretation Comments

 

             POCT HBA1C (test code = 4548-4) 6.8 %        4-6          A        

    

 

             Lab Interpretation (test code = Abnormal                           

    



             61950-8)                                            



[facility] GLUCOSE (AUTOMATED)2022 23:04:10





             Test Item    Value        Reference Range Interpretation Comments

 

             POCT GLU (test code = 9124734164) 142 mg/dL           H      

      

 

             Lab Interpretation (test code = Abnormal                           

    



             19345-2)                                            



[facility] GLUCOSE (AUTOMATED)2022 18:07:54





             Test Item    Value        Reference Range Interpretation Comments

 

             POCT GLU (test code = 2105737061) 198 mg/dL           H      

      

 

             Lab Interpretation (test code = Abnormal                           

    



             83424-6)                                            



[facility] GLUCOSE (AUTOMATED)2022 13:54:10





             Test Item    Value        Reference Range Interpretation Comments

 

             POCT GLU (test code = 8911802920) 141 mg/dL           H      

      

 

             Lab Interpretation (test code = Abnormal                           

    



             45399-8)                                            



[facility]37





             Test Item    Value        Reference Range Interpretation Comments

 

             POCT GLU (test code = 9288600587) 150 mg/dL           H      

      

 

             Lab Interpretation (test code = Abnormal                           

    



             92015-0)                                            



[facility]. METABOLIC PANEL ([id number])2022 
23:49:17





             Test Item    Value        Reference Range Interpretation Comments

 

             NA (test code = 139 mmol/L   135-145                   



             9514765657)                                         

 

             K (test code = 4.5 mmol/L   3.5-5.0                   



             5990204787)                                         

 

             CL (test code = 104 mmol/L                       



             2504560332)                                         

 

             CO2 TOTAL (test code = 25 mmol/L    23-31                     



             7539387797)                                         

 

             AGAP (test code =              2-16                      



             7008305652)                                         

 

             BUN (test code = 13 mg/dL     7-23                      



             6965406154)                                         

 

             GLUCOSE (test code = 228 mg/dL           H            



             8447075736)                                         

 

             CREATININE (test code = 0.56 mg/dL   0.60-1.25    L            



             1645043536)                                         

 

             TOTAL BILI (test code = 0.6 mg/dL    0.1-1.1                   



             2079853218)                                         

 

             CALCIUM (test code = 9.7 mg/dL    8.6-10.6                  



             5875068888)                                         

 

             T PROTEIN (test code = 7.8 g/dL     6.3-8.2                   



             8567887550)                                         

 

             ALBUMIN (test code = 4.4 g/dL     3.5-5.0                   



             8674516800)                                         

 

             ALK PHOS (test code = 132 U/L             H            



             1712980623)                                         

 

             ALTv (test code = 31 U/L       5-50                      



             1742-6)                                             

 

             AST(SGOT) (test code = 20 U/L       13-40                     



             7508006985)                                         

 

             eGFR (test code =              mL/min/1.73m2              



             4784159723)                                         

 

             MAL (test code = MAL) Association of                           



                          Glomerular Filtration                           



                          Rate (GFR) and Staging                           



                          of Kidney Disease*                           



                          +---------------------                           



                          --+-------------------                           



                          --+-------------------                           



                          ------+| GFR                           



                          (mL/min/1.73 m2) ?|                           



                          With Kidney Damage ?|                           



                          ?Without Kidney                           



                          Damage+---------------                           



                          --------+-------------                           



                          --------+-------------                           



                          ------------+| ?>90 ?                           



                          ? ? ? ? ? ? ? ?|                           



                          ?Stage one ? ? ? ? ?|                           



                          ? Normal ? ? ? ? ? ? ?                           



                          ?+--------------------                           



                          ---+------------------                           



                          ---+------------------                           



                          -------+| ?60-89 ? ? ?                           



                          ? ? ? ? ?| ?Stage two                           



                          ? ? ? ? ?| ? Decreased                           



                          GFR ? ? ? ?                            



                          +---------------------                           



                          --+-------------------                           



                          --+-------------------                           



                          ------+| ?30-59 ? ? ?                           



                          ? ? ? ? ?| ?Stage                           



                          three ? ? ? ?| ? Stage                           



                          three ? ? ? ? ?                           



                          +---------------------                           



                          --+-------------------                           



                          --+-------------------                           



                          ------+| ?15-29 ? ? ?                           



                          ? ? ? ? ?| ?Stage four                           



                          ? ? ? ? | ? Stage four                           



                          ? ? ? ? ?                              



                          ?+--------------------                           



                          ---+------------------                           



                          ---+------------------                           



                          -------+| ?<15 (or                           



                          dialysis) ? ?| ?Stage                           



                          five ? ? ? ? | ? Stage                           



                          five ? ? ? ? ?                           



                          ?+--------------------                           



                          ---+------------------                           



                          ---+------------------                           



                          -------+ *Each stage                           



                          assumes the associated                           



                          GFR level has been in                           



                          effect for at least                           



                          three months. ?Stages                           



                          1 to 5, with or                           



                          without kidney                           



                          disease, indicate                           



                          chronic kidney                           



                          disease. Notes:                           



                          Determination of                           



                          stages one and two                           



                          (with eGFR                             



                          >59mL/min/1.73 m2)                           



                          requires estimation of                           



                          kidney damage for at                           



                          least three months as                           



                          defined by structural                           



                          or functional                           



                          abnormalities of the                           



                          kidney, manifested by                           



                          either:Pathological                           



                          abnormalities or                           



                          Markers of kidney                           



                          damage (including                           



                          abnormalities in the                           



                          composition of the                           



                          blood or urine or                           



                          abnormalities in                           



                          imaging tests).                           

 

             Lab Interpretation Abnormal                               



             (test code = 26298-2)                                        



[facility] WITH [id number] 23:45:35





             Test Item    Value        Reference Range Interpretation Comments

 

             WBC (test code =              See_Comment                [Automated



             9309-2)                                             message] The sy

stem



                                                                 which generated



                                                                 this result



                                                                 transmitted



                                                                 reference range

:



                                                                 4.20 - 10.70



                                                                 10*3/?L. The



                                                                 reference range

 was



                                                                 not used to



                                                                 interpret this



                                                                 result as



                                                                 normal/abnormal

.

 

             RBC (test code =              See_Comment  H             [Automated



             419-8)                                              message] The sy

stem



                                                                 which generated



                                                                 this result



                                                                 transmitted



                                                                 reference range

:



                                                                 4.26 - 5.52



                                                                 10*6/?L. The



                                                                 reference range

 was



                                                                 not used to



                                                                 interpret this



                                                                 result as



                                                                 normal/abnormal

.

 

             HGB (test code = 16.4 g/dL    12.2-16.4                 



             718-7)                                              

 

             HCT (test code = 49.3 %       38.4-49.3                 



             4544-3)                                             

 

             MCV (test code = 83.6 fL      81.7-95.6                 



             787-2)                                              

 

             MCH (test code = 27.8 pg      26.1-32.7                 



             785-6)                                              

 

             MCHC (test code = 33.3 g/dL    31.2-35.0                 



             786-4)                                              

 

             RDW-SD (test code = 45.1 fL      38.5-51.6                 



             44768-9)                                            

 

             RDW-CV (test code = 14.9 %       12.1-15.4                 



             788-0)                                              

 

             PLT (test code =              See_Comment                [Automated



             777-3)                                              message] The sy

stem



                                                                 which generated



                                                                 this result



                                                                 transmitted



                                                                 reference range

:



                                                                 150 - 328 10*3/

?L.



                                                                 The reference r

zhen



                                                                 was not used to



                                                                 interpret this



                                                                 result as



                                                                 normal/abnormal

.

 

             MPV (test code = 10.9 fL      9.8-13.0                  



             63189-9)                                            

 

             IPF % (test code = 9.9 %        1.2-10.7                  Platelet 

count



             [id number])                                         measured by



                                                                 fluorescence



                                                                 method.

 

             NRBC/100 WBC (test              See_Comment                [Automat

ed



             code = 5604329106)                                        message] 

The system



                                                                 which generated



                                                                 this result



                                                                 transmitted



                                                                 reference range

:



                                                                 0.0 - 10.0 /100



                                                                 WBCs. The refer

ence



                                                                 range was not u

sed



                                                                 to interpret th

is



                                                                 result as



                                                                 normal/abnormal

.

 

             NRBC x10^3 (test code              See_Comment                [Auto

mated



             = 9629937123)                                        message] The s

ystem



                                                                 which generated



                                                                 this result



                                                                 transmitted



                                                                 reference range

:



                                                                 10*3/?L. The



                                                                 reference range

 was



                                                                 not used to



                                                                 interpret this



                                                                 result as



                                                                 normal/abnormal

.

 

             GRAN MAT (NEUT) % 65.4 %                                 



             (test code = 770-8)                                        

 

             IMM GRAN % (test code 0.60 %                                 



             = 2026431138)                                        

 

             LYMPH % (test code = 23.1 %                                 



             736-9)                                              

 

             MONO % (test code = 7.9 %                                  



             5905-5)                                             

 

             EOS % (test code = 2.6 %                                  



             713-8)                                              

 

             BASO % (test code = 0.4 %                                  



             706-2)                                              

 

             GRAN MAT x10^3(ANC) 6.34 10*3/uL 1.99-6.95                 



             (test code =                                        



             4974100972)                                         

 

             IMM GRAN x10^3 (test 0.06 10*3/uL 0.00-0.06                 



             code = 4114640620)                                        

 

             LYMPH x10^3 (test code 2.24 10*3/uL 1.09-3.23                 



             = 731-0)                                            

 

             MONO x10^3 (test code 0.77 10*3/uL 0.36-1.02                 



             = 742-7)                                            

 

             EOS x10^3 (test code = 0.25 10*3/uL 0.06-0.53                 



             711-2)                                              

 

             BASO x10^3 (test code 0.04 10*3/uL 0.01-0.09                 



             = 704-7)                                            

 

             Lab Interpretation Abnormal                               



             (test code = 39538-4)                                        



[id number]59





             Test Item    Value        Reference Range Interpretation Comments

 

             LACTIC ACID (test code = 3.00 mmol/L  0.50-2.20    H            



             2714493126)                                         

 

             Lab Interpretation (test code = Abnormal                           

    



             20817-0)                                            



[facility] WITH DIFF2022-10-30 01:28:46





             Test Item    Value        Reference Range Interpretation Comments

 

             WBC (test code =              See_Comment                [Automated



             6690-2)                                             message] The sy

stem



                                                                 which generated



                                                                 this result



                                                                 transmitted



                                                                 reference range

:



                                                                 4.20 - 10.70



                                                                 10*3/?L. The



                                                                 reference range

 was



                                                                 not used to



                                                                 interpret this



                                                                 result as



                                                                 normal/abnormal

.

 

             RBC (test code =              See_Comment  H             [Automated



             789-8)                                              message] The sy

stem



                                                                 which generated



                                                                 this result



                                                                 transmitted



                                                                 reference range

:



                                                                 4.26 - 5.52



                                                                 10*6/?L. The



                                                                 reference range

 was



                                                                 not used to



                                                                 interpret this



                                                                 result as



                                                                 normal/abnormal

.

 

             HGB (test code = 17.4 g/dL    12.2-16.4    H            



             718-7)                                              

 

             HCT (test code = 52.0 %       38.4-49.3    H            



             4544-3)                                             

 

             MCV (test code = 85.5 fL      81.7-95.6                 



             787-2)                                              

 

             MCH (test code = 28.6 pg      26.1-32.7                 



             785-6)                                              

 

             MCHC (test code = 33.5 g/dL    31.2-35.0                 



             786-4)                                              

 

             RDW-SD (test code = 47.6 fL      38.5-51.6                 



             23625-3)                                            

 

             RDW-CV (test code = 15.4 %       12.1-15.4                 



             788-0)                                              

 

             PLT (test code =              See_Comment  L             [Automated



             777-3)                                              message] The sy

stem



                                                                 which generated



                                                                 this result



                                                                 transmitted



                                                                 reference range

:



                                                                 150 - 328 10*3/

?L.



                                                                 The reference r

zhen



                                                                 was not used to



                                                                 interpret this



                                                                 result as



                                                                 normal/abnormal

.

 

             MPV (test code = 10.6 fL      9.8-13.0                  



             48682-1)                                            

 

             NRBC/100 WBC (test              See_Comment                [Automat

ed



             code = 1685257386)                                        message] 

The system



                                                                 which generated



                                                                 this result



                                                                 transmitted



                                                                 reference range

:



                                                                 0.0 - 10.0 /100



                                                                 WBCs. The refer

ence



                                                                 range was not u

sed



                                                                 to interpret th

is



                                                                 result as



                                                                 normal/abnormal

.

 

             NRBC x10^3 (test code              See_Comment                [Auto

mated



             = 8142399056)                                        message] The s

ystem



                                                                 which generated



                                                                 this result



                                                                 transmitted



                                                                 reference range

:



                                                                 10*3/?L. The



                                                                 reference range

 was



                                                                 not used to



                                                                 interpret this



                                                                 result as



                                                                 normal/abnormal

.

 

             GRAN MAT (NEUT) % 70.8 %                                 



             (test code = 770-8)                                        

 

             IMM GRAN % (test code 0.50 %                                 



             = 5633003366)                                        

 

             LYMPH % (test code = 20.4 %                                 



             736-9)                                              

 

             MONO % (test code = 5.9 %                                  



             5905-5)                                             

 

             EOS % (test code = 2.0 %                                  



             713-8)                                              

 

             BASO % (test code = 0.4 %                                  



             706-2)                                              

 

             GRAN MAT x10^3(ANC) 7.23 10*3/uL 1.99-6.95    H            



             (test code =                                        



             7403494591)                                         

 

             IMM GRAN x10^3 (test 0.05 10*3/uL 0.00-0.06                 



             code = 5727249777)                                        

 

             LYMPH x10^3 (test code 2.08 10*3/uL 1.09-3.23                 



             = 731-0)                                            

 

             MONO x10^3 (test code 0.60 10*3/uL 0.36-1.02                 



             = 742-7)                                            

 

             EOS x10^3 (test code = 0.20 10*3/uL 0.06-0.53                 



             711-2)                                              

 

             BASO x10^3 (test code 0.04 10*3/uL 0.01-0.09                 



             = 704-7)                                            

 

             Lab Interpretation Abnormal                               



             (test code = 34733-4)                                        



[facility]24[facility] 
01:23:44





             Test Item    Value        Reference Range Interpretation Comments

 

             NA (test code = 141 mmol/L   135-145                   



             4244133379)                                         

 

             K (test code = 4.7 mmol/L   3.5-5.0                   



             0058759856)                                         

 

             CL (test code = 107 mmol/L                       



             1457384301)                                         

 

             CO2 TOTAL (test code = 23 mmol/L    23-31                     



             2942132079)                                         

 

             AGAP (test code =              2-16                      



             0014493965)                                         

 

             BUN (test code = 16 mg/dL     7-23                      



             3544652143)                                         

 

             GLUCOSE (test code = 144 mg/dL           H            



             7387001331)                                         

 

             CREATININE (test code = 0.54 mg/dL   0.60-1.25    L            



             0304699361)                                         

 

             TOTAL BILI (test code = 0.6 mg/dL    0.1-1.1                   



             5694974308)                                         

 

             CALCIUM (test code = 9.5 mg/dL    8.6-10.6                  



             6958720078)                                         

 

             T PROTEIN (test code = 7.7 g/dL     6.3-8.2                   



             1280257386)                                         

 

             ALBUMIN (test code = 4.4 g/dL     3.5-5.0                   



             4382864785)                                         

 

             ALK PHOS (test code = 101 U/L                          



             9971567520)                                         

 

             ALTv (test code = 28 U/L       5-50                      



             1742-6)                                             

 

             AST(SGOT) (test code = 20 U/L       13-40                     



             2578591096)                                         

 

             eGFR (test code =              mL/min/1.73m2              



             4787539707)                                         

 

             MAL (test code = MAL) Association of                           



                          Glomerular Filtration                           



                          Rate (GFR) and Staging                           



                          of Kidney Disease*                           



                          +---------------------                           



                          --+-------------------                           



                          --+-------------------                           



                          ------+| GFR                           



                          (mL/min/1.73 m2) ?|                           



                          With Kidney Damage ?|                           



                          ?Without Kidney                           



                          Damage+---------------                           



                          --------+-------------                           



                          --------+-------------                           



                          ------------+| ?>90 ?                           



                          ? ? ? ? ? ? ? ?|                           



                          ?Stage one ? ? ? ? ?|                           



                          ? Normal ? ? ? ? ? ? ?                           



                          ?+--------------------                           



                          ---+------------------                           



                          ---+------------------                           



                          -------+| ?60-89 ? ? ?                           



                          ? ? ? ? ?| ?Stage two                           



                          ? ? ? ? ?| ? Decreased                           



                          GFR ? ? ? ?                            



                          +---------------------                           



                          --+-------------------                           



                          --+-------------------                           



                          ------+| ?30-59 ? ? ?                           



                          ? ? ? ? ?| ?Stage                           



                          three ? ? ? ?| ? Stage                           



                          three ? ? ? ? ?                           



                          +---------------------                           



                          --+-------------------                           



                          --+-------------------                           



                          ------+| ?15-29 ? ? ?                           



                          ? ? ? ? ?| ?Stage four                           



                          ? ? ? ? | ? Stage four                           



                          ? ? ? ? ?                              



                          ?+--------------------                           



                          ---+------------------                           



                          ---+------------------                           



                          -------+| ?<15 (or                           



                          dialysis) ? ?| ?Stage                           



                          five ? ? ? ? | ? Stage                           



                          five ? ? ? ? ?                           



                          ?+--------------------                           



                          ---+------------------                           



                          ---+------------------                           



                          -------+ *Each stage                           



                          assumes the associated                           



                          GFR level has been in                           



                          effect for at least                           



                          three months. ?Stages                           



                          1 to 5, with or                           



                          without kidney                           



                          disease, indicate                           



                          chronic kidney                           



                          disease. Notes:                           



                          Determination of                           



                          stages one and two                           



                          (with eGFR                             



                          >59mL/min/1.73 m2)                           



                          requires estimation of                           



                          kidney damage for at                           



                          least three months as                           



                          defined by structural                           



                          or functional                           



                          abnormalities of the                           



                          kidney, manifested by                           



                          either:Pathological                           



                          abnormalities or                           



                          Markers of kidney                           



                          damage (including                           



                          abnormalities in the                           



                          composition of the                           



                          blood or urine or                           



                          abnormalities in                           



                          imaging tests).                           

 

             Lab Interpretation Abnormal                               



             (test code = 55596-5)                                        



[facility]





             Test Item    Value        Reference Range Interpretation Comments

 

             LIPASE (test code = 4726124397) 99 U/L       0-220                 

    

 

             Lab Interpretation (test code = Normal                             

    



             81936-1)                                            



[facility] GLUCOSE (AUTOMATED)2022-10-23 13:16:26





             Test Item    Value        Reference Range Interpretation Comments

 

             POCT GLU (test code = 8571435121) 162 mg/dL           H      

      

 

             Lab Interpretation (test code = Abnormal                           

    



             17599-0)                                            



[facility] GLUCOSE (AUTOMATED)2022-10-23 01:23:12





             Test Item    Value        Reference Range Interpretation Comments

 

             POCT GLU (test code = 1672128742) 248 mg/dL           H      

      

 

             Lab Interpretation (test code = Abnormal                           

    



             98423-8)                                            



[facility] GLUCOSE (AUTOMATED)2022-10-22 21:32:18





             Test Item    Value        Reference Range Interpretation Comments

 

             POCT GLU (test code = 4575375951) 239 mg/dL           H      

      

 

             Lab Interpretation (test code = Abnormal                           

    



             25841-6)                                            



[facility] GLUCOSE (AUTOMATED)2022-10-22 01:49:48





             Test Item    Value        Reference Range Interpretation Comments

 

             POCT GLU (test code = 2670208564) 317 mg/dL           H      

      

 

             Lab Interpretation (test code = Abnormal                           

    



             64852-9)                                            



[facility] GLUCOSE (AUTOMATED)2022-10-21 21:38:03





             Test Item    Value        Reference Range Interpretation Comments

 

             POCT GLU (test code = 0810135261) 139 mg/dL           H      

      

 

             Lab Interpretation (test code = Abnormal                           

    



             82396-9)                                            



[facility] GLUCOSE (AUTOMATED)2022-10-21 16:14:31





             Test Item    Value        Reference Range Interpretation Comments

 

             POCT GLU (test code = 4304050703) 164 mg/dL           H      

      

 

             Lab Interpretation (test code = Abnormal                           

    



             56209-6)                                            



[facility] GLUCOSE (AUTOMATED)2022-10-21 12:46:40





             Test Item    Value        Reference Range Interpretation Comments

 

             POCT GLU (test code = 5156212495) 154 mg/dL           H      

      

 

             Lab Interpretation (test code = Abnormal                           

    



             27707-4)                                            



[facility]





             Test Item    Value        Reference Range Interpretation Comments

 

             LIPASE (test code = 6838146012) 216 U/L      0-220                 

    

 

             Lab Interpretation (test code = Normal                             

    



             89436-7)                                            



[id number][facility]21 
06:39:52





             Test Item    Value        Reference Range Interpretation Comments

 

             NA (test code = 139 mmol/L   135-145                   



             3968435471)                                         

 

             K (test code = 4.7 mmol/L   3.5-5                     



             0923506852)                                         

 

             CL (test code = 106 mmol/L                       



             4750974120)                                         

 

             CO2 TOTAL (test code = 20 mmol/L    23-31        L            



             6386766668)                                         

 

             AGAP (test code =              2-16                      



             4075939532)                                         

 

             BUN (test code = 16 mg/dL     7-23                      



             7105539400)                                         

 

             GLUCOSE (test code = 224 mg/dL           H            



             6113390542)                                         

 

             CREATININE (test code = 0.72 mg/dL   0.6-1.25                  



             1428449077)                                         

 

             TOTAL BILI (test code = 0.4 mg/dL    0.1-1.1                   



             5207238861)                                         

 

             CALCIUM (test code = 9.3 mg/dL    8.6-10.6                  



             6443392811)                                         

 

             T PROTEIN (test code = 7.2 g/dL     6.3-8.2                   



             9901236745)                                         

 

             ALBUMIN (test code = 4.1 g/dL     3.5-5                     



             5817248567)                                         

 

             ALK PHOS (test code = 118 U/L                          



             7291229955)                                         

 

             ALTv (test code = 46 U/L       5-50                      



             1742-6)                                             

 

             AST(SGOT) (test code = 18 U/L       13-40                     



             5299556716)                                         

 

             eGFR (test code =              mL/min/1.73m2              



             3009962030)                                         

 

             MAL (test code = MAL) Association of                           



                          Glomerular Filtration                           



                          Rate (GFR) and Staging                           



                          of Kidney Disease*                           



                          +---------------------                           



                          --+-------------------                           



                          --+-------------------                           



                          ------+| GFR                           



                          (mL/min/1.73 m2) ?|                           



                          With Kidney Damage ?|                           



                          ?Without Kidney                           



                          Damage+---------------                           



                          --------+-------------                           



                          --------+-------------                           



                          ------------+| ?>90 ?                           



                          ? ? ? ? ? ? ? ?|                           



                          ?Stage one ? ? ? ? ?|                           



                          ? Normal ? ? ? ? ? ? ?                           



                          ?+--------------------                           



                          ---+------------------                           



                          ---+------------------                           



                          -------+| ?60-89 ? ? ?                           



                          ? ? ? ? ?| ?Stage two                           



                          ? ? ? ? ?| ? Decreased                           



                          GFR ? ? ? ?                            



                          +---------------------                           



                          --+-------------------                           



                          --+-------------------                           



                          ------+| ?30-59 ? ? ?                           



                          ? ? ? ? ?| ?Stage                           



                          three ? ? ? ?| ? Stage                           



                          three ? ? ? ? ?                           



                          +---------------------                           



                          --+-------------------                           



                          --+-------------------                           



                          ------+| ?15-29 ? ? ?                           



                          ? ? ? ? ?| ?Stage four                           



                          ? ? ? ? | ? Stage four                           



                          ? ? ? ? ?                              



                          ?+--------------------                           



                          ---+------------------                           



                          ---+------------------                           



                          -------+| ?<15 (or                           



                          dialysis) ? ?| ?Stage                           



                          five ? ? ? ? | ? Stage                           



                          five ? ? ? ? ?                           



                          ?+--------------------                           



                          ---+------------------                           



                          ---+------------------                           



                          -------+ *Each stage                           



                          assumes the associated                           



                          GFR level has been in                           



                          effect for at least                           



                          three months. ?Stages                           



                          1 to 5, with or                           



                          without kidney                           



                          disease, indicate                           



                          chronic kidney                           



                          disease. Notes:                           



                          Determination of                           



                          stages one and two                           



                          (with eGFR                             



                          >59mL/min/1.73 m2)                           



                          requires estimation of                           



                          kidney damage for at                           



                          least three months as                           



                          defined by structural                           



                          or functional                           



                          abnormalities of the                           



                          kidney, manifested by                           



                          either:Pathological                           



                          abnormalities or                           



                          Markers of kidney                           



                          damage (including                           



                          abnormalities in the                           



                          composition of the                           



                          blood or urine or                           



                          abnormalities in                           



                          imaging tests).                           

 

             Lab Interpretation Abnormal                               



             (test code = 61066-3)                                        



[facility] WITH DIFF2022-10-21 06:28:46





             Test Item    Value        Reference Range Interpretation Comments

 

             WBC (test code =              See_Comment                [Automated



             4336-2)                                             message] The sy

stem



                                                                 which generated



                                                                 this result



                                                                 transmitted



                                                                 reference range

:



                                                                 4.20 - 10.70



                                                                 10*3/?L. The



                                                                 reference range

 was



                                                                 not used to



                                                                 interpret this



                                                                 result as



                                                                 normal/abnormal

.

 

             RBC (test code =              See_Comment                [Automated



             796-9)                                              message] The sy

stem



                                                                 which generated



                                                                 this result



                                                                 transmitted



                                                                 reference range

:



                                                                 4.26 - 5.52



                                                                 10*6/?L. The



                                                                 reference range

 was



                                                                 not used to



                                                                 interpret this



                                                                 result as



                                                                 normal/abnormal

.

 

             HGB (test code = 15.6 g/dL    12.2-16.4                 



             718-7)                                              

 

             HCT (test code = 46.3 %       38.4-49.3                 



             4544-3)                                             

 

             MCV (test code = 84.6 fL      81.7-95.6                 



             787-2)                                              

 

             MCH (test code = 28.5 pg      26.1-32.7                 



             785-6)                                              

 

             MCHC (test code = 33.7 g/dL    31.2-35                   



             786-4)                                              

 

             RDW-SD (test code = 45.7 fL      38.5-51.6                 



             77865-9)                                            

 

             RDW-CV (test code = 14.8 %       12.1-15.4                 



             788-0)                                              

 

             PLT (test code =              See_Comment  H             [Automated



             777-3)                                              message] The sy

stem



                                                                 which generated



                                                                 this result



                                                                 transmitted



                                                                 reference range

:



                                                                 150 - 328 10*3/

?L.



                                                                 The reference r

zhen



                                                                 was not used to



                                                                 interpret this



                                                                 result as



                                                                 normal/abnormal

.

 

             MPV (test code = 11.0 fL      9.8-13                    



             46280-7)                                            

 

             NRBC/100 WBC (test              See_Comment                [Automat

ed



             code = 9527421314)                                        message] 

The system



                                                                 which generated



                                                                 this result



                                                                 transmitted



                                                                 reference range

:



                                                                 0.0 - 10.0 /100



                                                                 WBCs. The refer

ence



                                                                 range was not u

sed



                                                                 to interpret th

is



                                                                 result as



                                                                 normal/abnormal

.

 

             NRBC x10^3 (test code              See_Comment                [Auto

mated



             = 1533838998)                                        message] The s

ystem



                                                                 which generated



                                                                 this result



                                                                 transmitted



                                                                 reference range

:



                                                                 10*3/?L. The



                                                                 reference range

 was



                                                                 not used to



                                                                 interpret this



                                                                 result as



                                                                 normal/abnormal

.

 

             GRAN MAT (NEUT) % 56.7 %                                 



             (test code = 770-8)                                        

 

             IMM GRAN % (test code 0.70 %                                 



             = 2952362550)                                        

 

             LYMPH % (test code = 31.5 %                                 



             736-9)                                              

 

             MONO % (test code = 8.6 %                                  



             5905-5)                                             

 

             EOS % (test code = 2.0 %                                  



             713-8)                                              

 

             BASO % (test code = 0.5 %                                  



             706-2)                                              

 

             GRAN MAT x10^3(ANC) 4.84 10*3/uL 1.99-6.95                 



             (test code =                                        



             8061130938)                                         

 

             IMM GRAN x10^3 (test 0.06 10*3/uL 0-0.06                    



             code = 9295863863)                                        

 

             LYMPH x10^3 (test code 2.69 10*3/uL 1.09-3.23                 



             = 731-0)                                            

 

             MONO x10^3 (test code 0.73 10*3/uL 0.36-1.02                 



             = 742-7)                                            

 

             EOS x10^3 (test code = 0.17 10*3/uL 0.06-0.53                 



             711-2)                                              

 

             BASO x10^3 (test code 0.04 10*3/uL 0.01-0.09                 



             = 704-7)                                            

 

             Lab Interpretation Abnormal                               



             (test code = 75681-8)                                        



[facility] CULTURE(AEROBIC/ANAEROBIC)2022 
20:34:19





             Test Item    Value        Reference Range Interpretation Comments

 

             TISSUE CULTURE (test No aerobic/anaerobic                          

 



             code = 20474-3) organisms isolated                           

 

             Gram stain (test code No PMNs or Mononuclear                       

    



             = 664-3)     cells observed                           



[facility] GLUCOSE (AUTOMATED)2022 18:36:41





             Test Item    Value        Reference Range Interpretation Comments

 

             POCT GLU (test code = 5970448009) 162 mg/dL           H      

      

 

             Lab Interpretation (test code = Abnormal                           

    



             41904-9)                                            



[facility] GLUCOSE (AUTOMATED)2022 14:48:29





             Test Item    Value        Reference Range Interpretation Comments

 

             POCT GLU (test code = 9191400969) 191 mg/dL           H      

      

 

             Lab Interpretation (test code = Abnormal                           

    



             35305-6)                                            



[facility] GLUCOSE (AUTOMATED)2022 10:56:47





             Test Item    Value        Reference Range Interpretation Comments

 

             POCT GLU (test code = 0814188608) 242 mg/dL           H      

      

 

             Lab Interpretation (test code = Abnormal                           

    



             62944-5)                                            



[facility] GLUCOSE (AUTOMATED)2022 05:47:30





             Test Item    Value        Reference Range Interpretation Comments

 

             POCT GLU (test code = 0487073347) 327 mg/dL           H      

      

 

             Lab Interpretation (test code = Abnormal                           

    



             82611-1)                                            



[facility] GLUCOSE (AUTOMATED)2022 02:18:34





             Test Item    Value        Reference Range Interpretation Comments

 

             POCT GLU (test code = 3368788360) 309 mg/dL           H      

      

 

             Lab Interpretation (test code = Abnormal                           

    



             65939-6)                                            



[facility] GLUCOSE (AUTOMATED)2022 23:17:38





             Test Item    Value        Reference Range Interpretation Comments

 

             POCT GLU (test code = 5671744607) 260 mg/dL           H      

      

 

             Lab Interpretation (test code = Abnormal                           

    



             80775-0)                                            



[facility] GLUCOSE (AUTOMATED)2022 18:33:41





             Test Item    Value        Reference Range Interpretation Comments

 

             POCT GLU (test code = 7055490927) 264 mg/dL           H      

      

 

             Lab Interpretation (test code = Abnormal                           

    



             11259-9)                                            



[facility]50





             Test Item    Value        Reference Range Interpretation Comments

 

             POCT GLU (test code = 2892695570) 219 mg/dL           H      

      

 

             Lab Interpretation (test code = Abnormal                           

    



             30603-2)                                            



[facility] METABOLIC PANEL (NA, K, CL, CO2, 
GLUCOSE, BUN, CREATININE, CA)2022 10:44:27





             Test Item    Value        Reference Range Interpretation Comments

 

             NA (test code = 132 mmol/L   135-145      L            



             9327719437)                                         

 

             K (test code = 4.4 mmol/L   3.5-5                     



             2092877252)                                         

 

             CL (test code = 103 mmol/L                       



             0316809370)                                         

 

             CO2 TOTAL (test code = 25 mmol/L    23-31                     



             0116167559)                                         

 

             AGAP (test code =              2-16                      



             9757202222)                                         

 

             BUN (test code = 15 mg/dL     7-23                      



             0774358707)                                         

 

             GLUCOSE (test code = 262 mg/dL           H            



             9313627080)                                         

 

             CREATININE (test code = 0.52 mg/dL   0.6-1.25     L            



             5763718127)                                         

 

             CALCIUM (test code = 8.3 mg/dL    8.6-10.6     L            



             8941447199)                                         

 

             eGFR (test code =              mL/min/1.73m2              



             8642512051)                                         

 

             MAL (test code = MAL) Association of                           



                          Glomerular Filtration                           



                          Rate (GFR) and Staging                           



                          of Kidney Disease*                           



                          +---------------------                           



                          --+-------------------                           



                          --+-------------------                           



                          ------+| GFR                           



                          (mL/min/1.73 m2) ?|                           



                          With Kidney Damage ?|                           



                          ?Without Kidney                           



                          Damage+---------------                           



                          --------+-------------                           



                          --------+-------------                           



                          ------------+| ?>90 ?                           



                          ? ? ? ? ? ? ? ?|                           



                          ?Stage one ? ? ? ? ?|                           



                          ? Normal ? ? ? ? ? ? ?                           



                          ?+--------------------                           



                          ---+------------------                           



                          ---+------------------                           



                          -------+| ?60-89 ? ? ?                           



                          ? ? ? ? ?| ?Stage two                           



                          ? ? ? ? ?| ? Decreased                           



                          GFR ? ? ? ?                            



                          +---------------------                           



                          --+-------------------                           



                          --+-------------------                           



                          ------+| ?30-59 ? ? ?                           



                          ? ? ? ? ?| ?Stage                           



                          three ? ? ? ?| ? Stage                           



                          three ? ? ? ? ?                           



                          +---------------------                           



                          --+-------------------                           



                          --+-------------------                           



                          ------+| ?15-29 ? ? ?                           



                          ? ? ? ? ?| ?Stage four                           



                          ? ? ? ? | ? Stage four                           



                          ? ? ? ? ?                              



                          ?+--------------------                           



                          ---+------------------                           



                          ---+------------------                           



                          -------+| ?<15 (or                           



                          dialysis) ? ?| ?Stage                           



                          five ? ? ? ? | ? Stage                           



                          five ? ? ? ? ?                           



                          ?+--------------------                           



                          ---+------------------                           



                          ---+------------------                           



                          -------+ *Each stage                           



                          assumes the associated                           



                          GFR level has been in                           



                          effect for at least                           



                          three months. ?Stages                           



                          1 to 5, with or                           



                          without kidney                           



                          disease, indicate                           



                          chronic kidney                           



                          disease. Notes:                           



                          Determination of                           



                          stages one and two                           



                          (with eGFR                             



                          >59mL/min/1.73 m2)                           



                          requires estimation of                           



                          kidney damage for at                           



                          least three months as                           



                          defined by structural                           



                          or functional                           



                          abnormalities of the                           



                          kidney, manifested by                           



                          either:Pathological                           



                          abnormalities or                           



                          Markers of kidney                           



                          damage (including                           



                          abnormalities in the                           



                          composition of the                           



                          blood or urine or                           



                          abnormalities in                           



                          imaging tests).                           

 

             Lab Interpretation Abnormal                               



             (test code = 22132-3)                                        



[facility] WITH [id number] 10:22:05





             Test Item    Value        Reference Range Interpretation Comments

 

             WBC (test code =              See_Comment                [Automated



             6690-2)                                             message] The sy

stem



                                                                 which generated



                                                                 this result



                                                                 transmitted



                                                                 reference range

:



                                                                 4.20 - 10.70



                                                                 10*3/?L. The



                                                                 reference range

 was



                                                                 not used to



                                                                 interpret this



                                                                 result as



                                                                 normal/abnormal

.

 

             RBC (test code =              See_Comment  L             [Automated



             789-8)                                              message] The sy

stem



                                                                 which generated



                                                                 this result



                                                                 transmitted



                                                                 reference range

:



                                                                 4.26 - 5.52



                                                                 10*6/?L. The



                                                                 reference range

 was



                                                                 not used to



                                                                 interpret this



                                                                 result as



                                                                 normal/abnormal

.

 

             HGB (test code = 10.5 g/dL    12.2-16.4    L            



             718-7)                                              

 

             HCT (test code = 31.2 %       38.4-49.3    L            



             4544-3)                                             

 

             MCV (test code = 88.9 fL      81.7-95.6                 



             787-2)                                              

 

             MCH (test code = 29.9 pg      26.1-32.7                 



             785-6)                                              

 

             MCHC (test code = 33.7 g/dL    31.2-35                   



             786-4)                                              

 

             RDW-SD (test code = 49.8 fL      38.5-51.6                 



             60495-4)                                            

 

             RDW-CV (test code = 15.9 %       12.1-15.4    H            



             788-0)                                              

 

             PLT (test code =              See_Comment  H             [Automated



             777-3)                                              message] The sy

stem



                                                                 which generated



                                                                 this result



                                                                 transmitted



                                                                 reference range

:



                                                                 150 - 328 10*3/

?L.



                                                                 The reference r

zhen



                                                                 was not used to



                                                                 interpret this



                                                                 result as



                                                                 normal/abnormal

.

 

             MPV (test code = 10.4 fL      9.8-13                    



             91083-1)                                            

 

             NRBC/100 WBC (test              See_Comment                [Automat

ed



             code = 1251543473)                                        message] 

The system



                                                                 which generated



                                                                 this result



                                                                 transmitted



                                                                 reference range

:



                                                                 0.0 - 10.0 /100



                                                                 WBCs. The refer

ence



                                                                 range was not u

sed



                                                                 to interpret th

is



                                                                 result as



                                                                 normal/abnormal

.

 

             NRBC x10^3 (test code              See_Comment                [Auto

mated



             = 3467843130)                                        message] The s

ystem



                                                                 which generated



                                                                 this result



                                                                 transmitted



                                                                 reference range

:



                                                                 10*3/?L. The



                                                                 reference range

 was



                                                                 not used to



                                                                 interpret this



                                                                 result as



                                                                 normal/abnormal

.

 

             GRAN MAT (NEUT) % 59.8 %                                 



             (test code = 770-8)                                        

 

             IMM GRAN % (test code 1.20 %                                 



             = 2181504961)                                        

 

             LYMPH % (test code = 28.2 %                                 



             736-9)                                              

 

             MONO % (test code = 8.4 %                                  



             5905-5)                                             

 

             EOS % (test code = 2.2 %                                  



             713-8)                                              

 

             BASO % (test code = 0.2 %                                  



             706-2)                                              

 

             GRAN MAT x10^3(ANC) 5.34 10*3/uL 1.99-6.95                 



             (test code =                                        



             0166829697)                                         

 

             IMM GRAN x10^3 (test 0.11 10*3/uL 0-0.06       H            



             code = 7342032102)                                        

 

             LYMPH x10^3 (test code 2.52 10*3/uL 1.09-3.23                 



             = 731-0)                                            

 

             MONO x10^3 (test code 0.75 10*3/uL 0.36-1.02                 



             = 742-7)                                            

 

             EOS x10^3 (test code = 0.20 10*3/uL 0.06-0.53                 



             711-2)                                              

 

             BASO x10^3 (test code              0.01-0.09                 



             = 704-7)                                            

 

             Lab Interpretation Abnormal                               



             (test code = 40410-2)                                        



[facility] GLUCOSE (AUTOMATED)2022 02:20:10





             Test Item    Value        Reference Range Interpretation Comments

 

             POCT GLU (test code = 9373079887) 281 mg/dL           H      

      

 

             Lab Interpretation (test code = Abnormal                           

    



             74281-8)                                            



[facility] GLUCOSE (AUTOMATED)2022-08-10 22:27:37





             Test Item    Value        Reference Range Interpretation Comments

 

             POCT GLU (test code = 6216321536) 187 mg/dL           H      

      

 

             Lab Interpretation (test code = Abnormal                           

    



             24518-8)                                            



[facility] GLUCOSE (AUTOMATED)2022-08-10 19:00:16





             Test Item    Value        Reference Range Interpretation Comments

 

             POCT GLU (test code = 7157224317) 167 mg/dL           H      

      

 

             Lab Interpretation (test code = Abnormal                           

    



             13254-2)                                            



[facility] GLUCOSE (AUTOMATED)2022-08-10 19:00:16





             Test Item    Value        Reference Range Interpretation Comments

 

             POCT GLU (test code = 3617512056) 167 mg/dL           H      

      

 

             Lab Interpretation (test code = Abnormal                           

    



             30922-8)                                            



[facility] GLUCOSE (AUTOMATED)2022-08-10 15:22:04





             Test Item    Value        Reference Range Interpretation Comments

 

             POCT GLU (test code = 7369647067) 138 mg/dL           H      

      

 

             Lab Interpretation (test code = Abnormal                           

    



             28023-5)                                            



[facility] GLUCOSE (AUTOMATED)2022-08-10 15:22:04





             Test Item    Value        Reference Range Interpretation Comments

 

             POCT GLU (test code = 8019139992) 138 mg/dL           H      

      

 

             Lab Interpretation (test code = Abnormal                           

    



             10897-1)                                            



[facility] GLUCOSE (AUTOMATED)2022-08-10 02:45:31





             Test Item    Value        Reference Range Interpretation Comments

 

             POCT GLU (test code = 3050717462) 142 mg/dL           H      

      

 

             Lab Interpretation (test code = Abnormal                           

    



             94690-9)                                            



[facility] GLUCOSE (AUTOMATED)2022-08-10 02:45:31





             Test Item    Value        Reference Range Interpretation Comments

 

             POCT GLU (test code = 2542119265) 142 mg/dL           H      

      

 

             Lab Interpretation (test code = Abnormal                           

    



             67594-5)                                            



[facility]





             Test Item    Value        Reference Range Interpretation Comments

 

             POCT GLU (test code = 8737923991) 260 mg/dL           H      

      

 

             Lab Interpretation (test code = Abnormal                           

    



             96089-9)                                            



[facility] GLUCOSE (AUTOMATED)2022 22:04:41





             Test Item    Value        Reference Range Interpretation Comments

 

             POCT GLU (test code = 2692192225) 260 mg/dL           H      

      

 

             Lab Interpretation (test code = Abnormal                           

    



             08784-5)                                            



[facility] Culture - Peripheral Vein #  
21:01:35





             Test Item    Value        Reference Range Interpretation Comments

 

             Blood Culture-Aerobic No organisms No growth                 Previo

us



             (test code = 17928-3) isolated                               prelim

inary



                                                                 verified result



                                                                 was Culture In



                                                                 Progress on



                                                                 2022 at 190

1



                                                                 CDTPrevious



                                                                 preliminary



                                                                 verified result



                                                                 was No growth a

t



                                                                 24 hours on



                                                                 2022 at 160

1



                                                                 CDTPrevious



                                                                 preliminary



                                                                 verified result



                                                                 was No growth a

t



                                                                 48 hours on



                                                                 2022 at 160

1



                                                                 CDTPrevious



                                                                 preliminary



                                                                 verified result



                                                                 was No growth a

t



                                                                 72 hours on



                                                                 2022 at 160

1



                                                                 CDT

 

             Blood        No organisms No growth                 Previous



             Culture-Anaerobic isolated                               preliminar

y



             (test code = 39603-1)                                        verifi

ed result



                                                                 was Culture In



                                                                 Progress on



                                                                 2022 at 190

1



                                                                 CDTPrevious



                                                                 preliminary



                                                                 verified result



                                                                 was No growth a

t



                                                                 24 hours on



                                                                 2022 at 160

1



                                                                 CDTPrevious



                                                                 preliminary



                                                                 verified result



                                                                 was No growth a

t



                                                                 48 hours on



                                                                 2022 at 160

1



                                                                 CDTPrevious



                                                                 preliminary



                                                                 verified result



                                                                 was No growth a

t



                                                                 72 hours on



                                                                 2022 at 160

1



                                                                 CDT

 

             Lab Interpretation Normal                                 



             (test code = 87942-3)                                        



[facility] Culture - Peripheral [id number] 
21:01:35





             Test Item    Value        Reference Range Interpretation Comments

 

             Blood Culture-Aerobic No organisms No growth                 Previo

us



             (test code = 17928-3) isolated                               prelim

inary



                                                                 verified result



                                                                 was Culture In



                                                                 Progress on



                                                                 2022 at 190

1



                                                                 CDTPrevious



                                                                 preliminary



                                                                 verified result



                                                                 was No growth a

t



                                                                 24 hours on



                                                                 2022 at 160

1



                                                                 CDTPrevious



                                                                 preliminary



                                                                 verified result



                                                                 was No growth a

t



                                                                 48 hours on



                                                                 2022 at 160

1



                                                                 CDTPrevious



                                                                 preliminary



                                                                 verified result



                                                                 was No growth a

t



                                                                 72 hours on



                                                                 2022 at 160

1



                                                                 CDT

 

             Blood        No organisms No growth                 Previous



             Culture-Anaerobic isolated                               preliminar

y



             (test code = 62997-4)                                        verifi

ed result



                                                                 was Culture In



                                                                 Progress on



                                                                 2022 at 190

1



                                                                 CDTPrevious



                                                                 preliminary



                                                                 verified result



                                                                 was No growth a

t



                                                                 24 hours on



                                                                 2022 at 160

1



                                                                 CDTPrevious



                                                                 preliminary



                                                                 verified result



                                                                 was No growth a

t



                                                                 48 hours on



                                                                 2022 at 160

1



                                                                 CDTPrevious



                                                                 preliminary



                                                                 verified result



                                                                 was No growth a

t



                                                                 72 hours on



                                                                 2022 at 160

1



                                                                 CDT

 

             Lab Interpretation Normal                                 



             (test code = 94966-2)                                        



[facility] Culture - Peripheral Vein #  
21:01:35





             Test Item    Value        Reference Range Interpretation Comments

 

             Blood Culture-Aerobic No organisms No growth                 Previo

us



             (test code = 17928-3) isolated                               prelim

inary



                                                                 verified result



                                                                 was Culture In



                                                                 Progress on



                                                                 2022 at 190

1



                                                                 CDTPrevious



                                                                 preliminary



                                                                 verified result



                                                                 was No growth a

t



                                                                 24 hours on



                                                                 2022 at 160

1



                                                                 CDTPrevious



                                                                 preliminary



                                                                 verified result



                                                                 was No growth a

t



                                                                 48 hours on



                                                                 2022 at 160

1



                                                                 CDTPrevious



                                                                 preliminary



                                                                 verified result



                                                                 was No growth a

t



                                                                 72 hours on



                                                                 2022 at 160

1



                                                                 CDT

 

             Blood        No organisms No growth                 Previous



             Culture-Anaerobic isolated                               preliminar

y



             (test code = 54273-7)                                        verifi

ed result



                                                                 was Culture In



                                                                 Progress on



                                                                 2022 at 190

1



                                                                 CDTPrevious



                                                                 preliminary



                                                                 verified result



                                                                 was No growth a

t



                                                                 24 hours on



                                                                 2022 at 160

1



                                                                 CDTPrevious



                                                                 preliminary



                                                                 verified result



                                                                 was No growth a

t



                                                                 48 hours on



                                                                 2022 at 160

1



                                                                 CDTPrevious



                                                                 preliminary



                                                                 verified result



                                                                 was No growth a

t



                                                                 72 hours on



                                                                 2022 at 160

1



                                                                 CDT

 

             Lab Interpretation Normal                                 



             (test code = 58443-2)                                        



[facility] Culture - Peripheral [id number] 
21:01:35





             Test Item    Value        Reference Range Interpretation Comments

 

             Blood Culture-Aerobic No organisms No growth                 Previo

us



             (test code = 17928-3) isolated                               prelim

inary



                                                                 verified result



                                                                 was Culture In



                                                                 Progress on



                                                                 2022 at 190

1



                                                                 CDTPrevious



                                                                 preliminary



                                                                 verified result



                                                                 was No growth a

t



                                                                 24 hours on



                                                                 2022 at 160

1



                                                                 CDTPrevious



                                                                 preliminary



                                                                 verified result



                                                                 was No growth a

t



                                                                 48 hours on



                                                                 2022 at 160

1



                                                                 CDTPrevious



                                                                 preliminary



                                                                 verified result



                                                                 was No growth a

t



                                                                 72 hours on



                                                                 2022 at 160

1



                                                                 CDT

 

             Blood        No organisms No growth                 Previous



             Culture-Anaerobic isolated                               preliminar

y



             (test code = 52871-1)                                        verifi

ed result



                                                                 was Culture In



                                                                 Progress on



                                                                 2022 at 190

1



                                                                 CDTPrevious



                                                                 preliminary



                                                                 verified result



                                                                 was No growth a

t



                                                                 24 hours on



                                                                 2022 at 160

1



                                                                 CDTPrevious



                                                                 preliminary



                                                                 verified result



                                                                 was No growth a

t



                                                                 48 hours on



                                                                 2022 at 160

1



                                                                 CDTPrevious



                                                                 preliminary



                                                                 verified result



                                                                 was No growth a

t



                                                                 72 hours on



                                                                 2022 at 160

1



                                                                 CDT

 

             Lab Interpretation Normal                                 



             (test code = 09362-1)                                        



[facility] GLUCOSE (AUTOMATED)2022 14:13:49





             Test Item    Value        Reference Range Interpretation Comments

 

             POCT GLU (test code = 9452221443) 176 mg/dL           H      

      

 

             Lab Interpretation (test code = Abnormal                           

    



             92034-5)                                            



[facility] GLUCOSE (AUTOMATED)2022 14:13:49





             Test Item    Value        Reference Range Interpretation Comments

 

             POCT GLU (test code = 2194440562) 176 mg/dL           H      

      

 

             Lab Interpretation (test code = Abnormal                           

    



             28655-2)                                            



[facility] GLUCOSE (AUTOMATED)2022 02:30:54





             Test Item    Value        Reference Range Interpretation Comments

 

             POCT GLU (test code = 6977947396) 113 mg/dL           H      

      

 

             Lab Interpretation (test code = Abnormal                           

    



             22730-0)                                            



[facility] GLUCOSE (AUTOMATED)2022 02:30:54





             Test Item    Value        Reference Range Interpretation Comments

 

             POCT GLU (test code = 5927906852) 113 mg/dL           H      

      

 

             Lab Interpretation (test code = Abnormal                           

    



             88216-2)                                            



[facility] GLUCOSE (AUTOMATED)2022 23:41:12





             Test Item    Value        Reference Range Interpretation Comments

 

             POCT GLU (test code = 3500515540) 135 mg/dL           H      

      

 

             Lab Interpretation (test code = Abnormal                           

    



             38523-0)                                            



[facility] GLUCOSE (AUTOMATED)2022 23:41:12





             Test Item    Value        Reference Range Interpretation Comments

 

             POCT GLU (test code = 0624100093) 135 mg/dL           H      

      

 

             Lab Interpretation (test code = Abnormal                           

    



             13408-5)                                            



[facility] GLUCOSE (AUTOMATED)2022 17:13:18





             Test Item    Value        Reference Range Interpretation Comments

 

             POCT GLU (test code = 5148335891) 238 mg/dL           H      

      

 

             Lab Interpretation (test code = Abnormal                           

    



             65314-0)                                            



[facility] GLUCOSE (AUTOMATED)2022 17:13:18





             Test Item    Value        Reference Range Interpretation Comments

 

             POCT GLU (test code = 5062562679) 238 mg/dL           H      

      

 

             Lab Interpretation (test code = Abnormal                           

    



             59703-8)                                            



[facility] GLUCOSE (AUTOMATED)2022 17:13:18





             Test Item    Value        Reference Range Interpretation Comments

 

             POCT GLU (test code = 6131764836) 238 mg/dL           H      

      

 

             Lab Interpretation (test code = Abnormal                           

    



             80494-0)                                            



[facility]-REACTIVE [id number] 16:50:53





             Test Item    Value        Reference Range Interpretation Comments

 

             CRP (test code = 0.9 mg/dL    See_Comment  H             [Automated

 message]



             8223692403)                                         The system [employer]



                                                                 generated this



                                                                 result transmit

huma



                                                                 reference range

:



                                                                 <=0.8. The refe

rence



                                                                 range was not u

sed



                                                                 to interpret th

is



                                                                 result as



                                                                 normal/abnormal

.

 

             Lab Interpretation (test Abnormal                               



             code = 43000-9)                                        



[facility]-REACTIVE [id number] 16:50:53





             Test Item    Value        Reference Range Interpretation Comments

 

             CRP (test code = 0.9 mg/dL    See_Comment  H             [Automated

 message]



             7742488456)                                         The system [employer]



                                                                 generated this



                                                                 result transmit

huma



                                                                 reference range

:



                                                                 <=0.8. The refe

rence



                                                                 range was not u

sed



                                                                 to interpret th

is



                                                                 result as



                                                                 normal/abnormal

.

 

             Lab Interpretation (test Abnormal                               



             code = 16592-9)                                        



[facility]-REACTIVE [id number] 16:50:53





             Test Item    Value        Reference Range Interpretation Comments

 

             CRP (test code = 0.9 mg/dL    See_Comment  H             [Automated

 message]



             3693423176)                                         The system [employer]



                                                                 generated this



                                                                 result transmit

huma



                                                                 reference range

:



                                                                 <=0.8. The refe

rence



                                                                 range was not u

sed



                                                                 to interpret th

is



                                                                 result as



                                                                 normal/abnormal

.

 

             Lab Interpretation (test Abnormal                               



             code = 93678-4)                                        



[facility] GLUCOSE (AUTOMATED)2022 15:55:49





             Test Item    Value        Reference Range Interpretation Comments

 

             POCT GLU (test code = 6846001138) 209 mg/dL           H      

      

 

             Lab Interpretation (test code = Abnormal                           

    



             85883-2)                                            



[facility] GLUCOSE (AUTOMATED)2022 15:55:49





             Test Item    Value        Reference Range Interpretation Comments

 

             POCT GLU (test code = 0415705541) 209 mg/dL           H      

      

 

             Lab Interpretation (test code = Abnormal                           

    



             60024-8)                                            



[facility] GLUCOSE (AUTOMATED)2022 00:58:44





             Test Item    Value        Reference Range Interpretation Comments

 

             POCT GLU (test code = 5276195764) 300 mg/dL           H      

      

 

             Lab Interpretation (test code = Abnormal                           

    



             05606-6)                                            



[facility] GLUCOSE (AUTOMATED)2022 00:58:44





             Test Item    Value        Reference Range Interpretation Comments

 

             POCT GLU (test code = 1485619198) 300 mg/dL           H      

      

 

             Lab Interpretation (test code = Abnormal                           

    



             56241-0)                                            



[facility] GLUCOSE (AUTOMATED)2022 21:28:03





             Test Item    Value        Reference Range Interpretation Comments

 

             POCT GLU (test code = 8878249219) 119 mg/dL           H      

      

 

             Lab Interpretation (test code = Abnormal                           

    



             10430-4)                                            



[facility] GLUCOSE (AUTOMATED)2022 21:28:03





             Test Item    Value        Reference Range Interpretation Comments

 

             POCT GLU (test code = 0473074278) 119 mg/dL           H      

      

 

             Lab Interpretation (test code = Abnormal                           

    



             67261-4)                                            



[facility] GLUCOSE (AUTOMATED)2022 16:51:05





             Test Item    Value        Reference Range Interpretation Comments

 

             POCT GLU (test code = 9190529157) 275 mg/dL           H      

      

 

             Lab Interpretation (test code = Abnormal                           

    



             62932-2)                                            



[facility] GLUCOSE (AUTOMATED)2022 16:51:05





             Test Item    Value        Reference Range Interpretation Comments

 

             POCT GLU (test code = 8341121271) 275 mg/dL           H      

      

 

             Lab Interpretation (test code = Abnormal                           

    



             75711-4)                                            



[facility] METABOLIC PANEL (NA, K, CL, CO2, 
GLUCOSE, BUN, CREATININE, CA)2022 15:51:39





             Test Item    Value        Reference Range Interpretation Comments

 

             NA (test code = 136 mmol/L   135-145                   



             4244701212)                                         

 

             K (test code = 3.7 mmol/L   3.5-5                     



             6786246911)                                         

 

             CL (test code = 111 mmol/L          H            



             0138790006)                                         

 

             CO2 TOTAL (test code = 21 mmol/L    23-31        L            



             7555612953)                                         

 

             AGAP (test code =              2-16                      



             8365604519)                                         

 

             BUN (test code = 9 mg/dL      7-23                      



             0737844804)                                         

 

             GLUCOSE (test code = 278 mg/dL           H            



             3130930716)                                         

 

             CREATININE (test code = 0.46 mg/dL   0.6-1.25     L            



             6250311329)                                         

 

             CALCIUM (test code = 8.2 mg/dL    8.6-10.6     L            



             6535419959)                                         

 

             eGFR (test code =              mL/min/1.73m2              



             8389814704)                                         

 

             MAL (test code = MAL) Association of                           



                          Glomerular Filtration                           



                          Rate (GFR) and Staging                           



                          of Kidney Disease*                           



                          +---------------------                           



                          --+-------------------                           



                          --+-------------------                           



                          ------+| GFR                           



                          (mL/min/1.73 m2) ?|                           



                          With Kidney Damage ?|                           



                          ?Without Kidney                           



                          Damage+---------------                           



                          --------+-------------                           



                          --------+-------------                           



                          ------------+| ?>90 ?                           



                          ? ? ? ? ? ? ? ?|                           



                          ?Stage one ? ? ? ? ?|                           



                          ? Normal ? ? ? ? ? ? ?                           



                          ?+--------------------                           



                          ---+------------------                           



                          ---+------------------                           



                          -------+| ?60-89 ? ? ?                           



                          ? ? ? ? ?| ?Stage two                           



                          ? ? ? ? ?| ? Decreased                           



                          GFR ? ? ? ?                            



                          +---------------------                           



                          --+-------------------                           



                          --+-------------------                           



                          ------+| ?30-59 ? ? ?                           



                          ? ? ? ? ?| ?Stage                           



                          three ? ? ? ?| ? Stage                           



                          three ? ? ? ? ?                           



                          +---------------------                           



                          --+-------------------                           



                          --+-------------------                           



                          ------+| ?15-29 ? ? ?                           



                          ? ? ? ? ?| ?Stage four                           



                          ? ? ? ? | ? Stage four                           



                          ? ? ? ? ?                              



                          ?+--------------------                           



                          ---+------------------                           



                          ---+------------------                           



                          -------+| ?<15 (or                           



                          dialysis) ? ?| ?Stage                           



                          five ? ? ? ? | ? Stage                           



                          five ? ? ? ? ?                           



                          ?+--------------------                           



                          ---+------------------                           



                          ---+------------------                           



                          -------+ *Each stage                           



                          assumes the associated                           



                          GFR level has been in                           



                          effect for at least                           



                          three months. ?Stages                           



                          1 to 5, with or                           



                          without kidney                           



                          disease, indicate                           



                          chronic kidney                           



                          disease. Notes:                           



                          Determination of                           



                          stages one and two                           



                          (with eGFR                             



                          >59mL/min/1.73 m2)                           



                          requires estimation of                           



                          kidney damage for at                           



                          least three months as                           



                          defined by structural                           



                          or functional                           



                          abnormalities of the                           



                          kidney, manifested by                           



                          either:Pathological                           



                          abnormalities or                           



                          Markers of kidney                           



                          damage (including                           



                          abnormalities in the                           



                          composition of the                           



                          blood or urine or                           



                          abnormalities in                           



                          imaging tests).                           

 

             Lab Interpretation Abnormal                               



             (test code = 29475-1)                                        



[facility] (NA, K, CL, CO2, 
GLUCOSE, BUN, CREATININE, CA)2022 15:51:39





             Test Item    Value        Reference Range Interpretation Comments

 

             NA (test code = 136 mmol/L   135-145                   



             4047666580)                                         

 

             K (test code = 3.7 mmol/L   3.5-5                     



             6782788113)                                         

 

             CL (test code = 111 mmol/L          H            



             6147007282)                                         

 

             CO2 TOTAL (test code = 21 mmol/L    23-31        L            



             7959408540)                                         

 

             AGAP (test code =              2-16                      



             3035386824)                                         

 

             BUN (test code = 9 mg/dL      7-23                      



             9033707059)                                         

 

             GLUCOSE (test code = 278 mg/dL           H            



             7602909311)                                         

 

             CREATININE (test code = 0.46 mg/dL   0.6-1.25     L            



             2133937175)                                         

 

             CALCIUM (test code = 8.2 mg/dL    8.6-10.6     L            



             6280010871)                                         

 

             eGFR (test code =              mL/min/1.73m2              



             4669130062)                                         

 

             MAL (test code = MAL) Association of                           



                          Glomerular Filtration                           



                          Rate (GFR) and Staging                           



                          of Kidney Disease*                           



                          +---------------------                           



                          --+-------------------                           



                          --+-------------------                           



                          ------+| GFR                           



                          (mL/min/1.73 m2) ?|                           



                          With Kidney Damage ?|                           



                          ?Without Kidney                           



                          Damage+---------------                           



                          --------+-------------                           



                          --------+-------------                           



                          ------------+| ?>90 ?                           



                          ? ? ? ? ? ? ? ?|                           



                          ?Stage one ? ? ? ? ?|                           



                          ? Normal ? ? ? ? ? ? ?                           



                          ?+--------------------                           



                          ---+------------------                           



                          ---+------------------                           



                          -------+| ?60-89 ? ? ?                           



                          ? ? ? ? ?| ?Stage two                           



                          ? ? ? ? ?| ? Decreased                           



                          GFR ? ? ? ?                            



                          +---------------------                           



                          --+-------------------                           



                          --+-------------------                           



                          ------+| ?30-59 ? ? ?                           



                          ? ? ? ? ?| ?Stage                           



                          three ? ? ? ?| ? Stage                           



                          three ? ? ? ? ?                           



                          +---------------------                           



                          --+-------------------                           



                          --+-------------------                           



                          ------+| ?15-29 ? ? ?                           



                          ? ? ? ? ?| ?Stage four                           



                          ? ? ? ? | ? Stage four                           



                          ? ? ? ? ?                              



                          ?+--------------------                           



                          ---+------------------                           



                          ---+------------------                           



                          -------+| ?<15 (or                           



                          dialysis) ? ?| ?Stage                           



                          five ? ? ? ? | ? Stage                           



                          five ? ? ? ? ?                           



                          ?+--------------------                           



                          ---+------------------                           



                          ---+------------------                           



                          -------+ *Each stage                           



                          assumes the associated                           



                          GFR level has been in                           



                          effect for at least                           



                          three months. ?Stages                           



                          1 to 5, with or                           



                          without kidney                           



                          disease, indicate                           



                          chronic kidney                           



                          disease. Notes:                           



                          Determination of                           



                          stages one and two                           



                          (with eGFR                             



                          >59mL/min/1.73 m2)                           



                          requires estimation of                           



                          kidney damage for at                           



                          least three months as                           



                          defined by structural                           



                          or functional                           



                          abnormalities of the                           



                          kidney, manifested by                           



                          either:Pathological                           



                          abnormalities or                           



                          Markers of kidney                           



                          damage (including                           



                          abnormalities in the                           



                          composition of the                           



                          blood or urine or                           



                          abnormalities in                           



                          imaging tests).                           

 

             Lab Interpretation Abnormal                               



             (test code = 63699-3)                                        



[facility] GLUCOSE (AUTOMATED)2022 12:36:29





             Test Item    Value        Reference Range Interpretation Comments

 

             POCT GLU (test code = 7797881804) 276 mg/dL           H      

      

 

             Lab Interpretation (test code = Abnormal                           

    



             78485-5)                                            



[facility] GLUCOSE (AUTOMATED)2022 12:36:29





             Test Item    Value        Reference Range Interpretation Comments

 

             POCT GLU (test code = 0071677198) 276 mg/dL           H      

      

 

             Lab Interpretation (test code = Abnormal                           

    



             69253-5)                                            



[facility] GLUCOSE (AUTOMATED)2022 01:29:51





             Test Item    Value        Reference Range Interpretation Comments

 

             POCT GLU (test code = 2055978400) 289 mg/dL           H      

      

 

             Lab Interpretation (test code = Abnormal                           

    



             85735-8)                                            



[facility] GLUCOSE (AUTOMATED)2022 01:29:51





             Test Item    Value        Reference Range Interpretation Comments

 

             POCT GLU (test code = 4972253103) 289 mg/dL           H      

      

 

             Lab Interpretation (test code = Abnormal                           

    



             88607-7)                                            



[facility] GLUCOSE (AUTOMATED)2022 21:24:38





             Test Item    Value        Reference Range Interpretation Comments

 

             POCT GLU (test code = 4140175965) 173 mg/dL           H      

      

 

             Lab Interpretation (test code = Abnormal                           

    



             94744-0)                                            



[facility] GLUCOSE (AUTOMATED)2022 21:24:38





             Test Item    Value        Reference Range Interpretation Comments

 

             POCT GLU (test code = 1267975428) 173 mg/dL           H      

      

 

             Lab Interpretation (test code = Abnormal                           

    



             07401-8)                                            



[facility] GLUCOSE (AUTOMATED)2022 16:50:49





             Test Item    Value        Reference Range Interpretation Comments

 

             POCT GLU (test code = 1800836512) 279 mg/dL           H      

      

 

             Lab Interpretation (test code = Abnormal                           

    



             97324-4)                                            



[facility] GLUCOSE (AUTOMATED)2022 16:50:49





             Test Item    Value        Reference Range Interpretation Comments

 

             POCT GLU (test code = 8907931704) 279 mg/dL           H      

      

 

             Lab Interpretation (test code = Abnormal                           

    



             27780-0)                                            



[facility] GLUCOSE (AUTOMATED)2022 13:31:23





             Test Item    Value        Reference Range Interpretation Comments

 

             POCT GLU (test code = 4418952057) 281 mg/dL           H      

      

 

             Lab Interpretation (test code = Abnormal                           

    



             11013-4)                                            



[facility] GLUCOSE (AUTOMATED)2022 13:31:23





             Test Item    Value        Reference Range Interpretation Comments

 

             POCT GLU (test code = 3373979080) 281 mg/dL           H      

      

 

             Lab Interpretation (test code = Abnormal                           

    



             13706-5)                                            



[facility] GLUCOSE (AUTOMATED)2022 10:02:35





             Test Item    Value        Reference Range Interpretation Comments

 

             POCT GLU (test code = 1856842747) 339 mg/dL           H      

      

 

             Lab Interpretation (test code = Abnormal                           

    



             07912-4)                                            



[facility] GLUCOSE (AUTOMATED)2022 10:02:35





             Test Item    Value        Reference Range Interpretation Comments

 

             POCT GLU (test code = 3378329402) 339 mg/dL           H      

      

 

             Lab Interpretation (test code = Abnormal                           

    



             85749-9)                                            



[facility] GLUCOSE (AUTOMATED)2022 06:33:22





             Test Item    Value        Reference Range Interpretation Comments

 

             POCT GLU (test code = 3694953328) 295 mg/dL           H      

      

 

             Lab Interpretation (test code = Abnormal                           

    



             59467-8)                                            



[facility] GLUCOSE (AUTOMATED)2022 06:33:22





             Test Item    Value        Reference Range Interpretation Comments

 

             POCT GLU (test code = 7941951482) 295 mg/dL           H      

      

 

             Lab Interpretation (test code = Abnormal                           

    



             10496-3)                                            



[facility] GLUCOSE (AUTOMATED)2022 01:54:18





             Test Item    Value        Reference Range Interpretation Comments

 

             POCT GLU (test code = 1957470541) 258 mg/dL           H      

      

 

             Lab Interpretation (test code = Abnormal                           

    



             32163-8)                                            



[facility] GLUCOSE (AUTOMATED)2022 01:54:18





             Test Item    Value        Reference Range Interpretation Comments

 

             POCT GLU (test code = 2989239064) 258 mg/dL           H      

      

 

             Lab Interpretation (test code = Abnormal                           

    



             16503-7)                                            



[facility] GLUCOSE (AUTOMATED)2022 23:03:09





             Test Item    Value        Reference Range Interpretation Comments

 

             POCT GLU (test code = 2195881412) 286 mg/dL           H      

      

 

             Lab Interpretation (test code = Abnormal                           

    



             42783-5)                                            



[facility] GLUCOSE (AUTOMATED)2022 23:03:09





             Test Item    Value        Reference Range Interpretation Comments

 

             POCT GLU (test code = 7709943857) 286 mg/dL           H      

      

 

             Lab Interpretation (test code = Abnormal                           

    



             75531-8)                                            



[facility] GLUCOSE (AUTOMATED)2022 20:48:08





             Test Item    Value        Reference Range Interpretation Comments

 

             POCT GLU (test code = 8303801039) 249 mg/dL           H      

      

 

             Lab Interpretation (test code = Abnormal                           

    



             17150-9)                                            



[facility] GLUCOSE (AUTOMATED)2022 20:48:08





             Test Item    Value        Reference Range Interpretation Comments

 

             POCT GLU (test code = 0254788286) 249 mg/dL           H      

      

 

             Lab Interpretation (test code = Abnormal                           

    



             32199-4)                                            



[id number] 17:01:47





             Test Item    Value        Reference Range Interpretation Comments

 

             BOH (test code = 1.0 mmol/L                             



             9708300789)                                         

 

             MAL (test code = Normal Ranges: ? ?                           



             MAL)         Nonfasting ? ? ? ? ? Less                           



                          than 0.1 mmol/L ? ?                           



                          Overnight Fast ? ? ? Less                           



                          than 0.4 mmol/L ? ? Fasting                           



                          (1-2 weeks) ?6-8 mmol/L Test                          

 



                          developed and                           



                          characteristics determined                           



                          by [facility].                          

 



[id number] 17:01:47





             Test Item    Value        Reference Range Interpretation Comments

 

             BOH (test code = 1.0 mmol/L                             



             8601113073)                                         

 

             MAL (test code = Normal Ranges: ? ?                           



             MAL)         Nonfasting ? ? ? ? ? Less                           



                          than 0.1 mmol/L ? ?                           



                          Overnight Fast ? ? ? Less                           



                          than 0.4 mmol/L ? ? Fasting                           



                          (1-2 weeks) ?6-8 mmol/L Test                          

 



                          developed and                           



                          characteristics determined                           



                          by [facility].                          

 



[id number] 17:01:47





             Test Item    Value        Reference Range Interpretation Comments

 

             BOH (test code = 1.0 mmol/L                             



             0053170758)                                         

 

             MAL (test code = Normal Ranges: ? ?                           



             MAL)         Nonfasting ? ? ? ? ? Less                           



                          than 0.1 mmol/L ? ?                           



                          Overnight Fast ? ? ? Less                           



                          than 0.4 mmol/L ? ? Fasting                           



                          (1-2 weeks) ?6-8 mmol/L Test                          

 



                          developed and                           



                          characteristics determined                           



                          by [facility].                          

 



[facility] GLUCOSE (AUTOMATED)2022 16:42:59





             Test Item    Value        Reference Range Interpretation Comments

 

             POCT GLU (test code = 1013188735) 264 mg/dL           H      

      

 

             Lab Interpretation (test code = Abnormal                           

    



             53645-4)                                            



[facility] GLUCOSE (AUTOMATED)2022 16:42:59





             Test Item    Value        Reference Range Interpretation Comments

 

             POCT GLU (test code = 8645088430) 264 mg/dL           H      

      

 

             Lab Interpretation (test code = Abnormal                           

    



             40867-6)                                            



[facility] Metabolic Panel (Na, K, Cl, CO2, 
Glucose, BUN, Creatinine, Ca)2022 16:16:20





             Test Item    Value        Reference Range Interpretation Comments

 

             NA (test code = 137 mmol/L   135-145                   



             [id number])                                         

 

             K (test code = 3.5 mmol/L   3.5-5                     



             0838372630)                                         

 

             CL (test code = 115 mmol/L          H            



             3257064703)                                         

 

             CO2 TOTAL (test code = 17 mmol/L    23-31        L            



             4982325423)                                         

 

             AGAP (test code =              2-16                      



             6640804169)                                         

 

             BUN (test code = 5 mg/dL      7-23         L            



             4855128261)                                         

 

             GLUCOSE (test code = 271 mg/dL           H            



             [id number])                                         

 

             CREATININE (test code = 0.45 mg/dL   0.6-1.25     L            



             0239263976)                                         

 

             CALCIUM (test code = 8.5 mg/dL    8.6-10.6     L            



             0649522428)                                         

 

             eGFR (test code =              mL/min/1.73m2              



             5217993101)                                         

 

             MAL (test code = MAL) Association of                           



                          Glomerular Filtration                           



                          Rate (GFR) and Staging                           



                          of Kidney Disease*                           



                          +---------------------                           



                          --+-------------------                           



                          --+-------------------                           



                          ------+| GFR                           



                          (mL/min/1.73 m2) ?|                           



                          With Kidney Damage ?|                           



                          ?Without Kidney                           



                          Damage+---------------                           



                          --------+-------------                           



                          --------+-------------                           



                          ------------+| ?>90 ?                           



                          ? ? ? ? ? ? ? ?|                           



                          ?Stage one ? ? ? ? ?|                           



                          ? Normal ? ? ? ? ? ? ?                           



                          ?+--------------------                           



                          ---+------------------                           



                          ---+------------------                           



                          -------+| ?60-89 ? ? ?                           



                          ? ? ? ? ?| ?Stage two                           



                          ? ? ? ? ?| ? Decreased                           



                          GFR ? ? ? ?                            



                          +---------------------                           



                          --+-------------------                           



                          --+-------------------                           



                          ------+| ?30-59 ? ? ?                           



                          ? ? ? ? ?| ?Stage                           



                          three ? ? ? ?| ? Stage                           



                          three ? ? ? ? ?                           



                          +---------------------                           



                          --+-------------------                           



                          --+-------------------                           



                          ------+| ?15-29 ? ? ?                           



                          ? ? ? ? ?| ?Stage four                           



                          ? ? ? ? | ? Stage four                           



                          ? ? ? ? ?                              



                          ?+--------------------                           



                          ---+------------------                           



                          ---+------------------                           



                          -------+| ?<15 (or                           



                          dialysis) ? ?| ?Stage                           



                          five ? ? ? ? | ? Stage                           



                          five ? ? ? ? ?                           



                          ?+--------------------                           



                          ---+------------------                           



                          ---+------------------                           



                          -------+ *Each stage                           



                          assumes the associated                           



                          GFR level has been in                           



                          effect for at least                           



                          three months. ?Stages                           



                          1 to 5, with or                           



                          without kidney                           



                          disease, indicate                           



                          chronic kidney                           



                          disease. Notes:                           



                          Determination of                           



                          stages one and two                           



                          (with eGFR                             



                          >59mL/min/1.73 m2)                           



                          requires estimation of                           



                          kidney damage for at                           



                          least three months as                           



                          defined by structural                           



                          or functional                           



                          abnormalities of the                           



                          kidney, manifested by                           



                          either:Pathological                           



                          abnormalities or                           



                          Markers of kidney                           



                          damage (including                           



                          abnormalities in the                           



                          composition of the                           



                          blood or urine or                           



                          abnormalities in                           



                          imaging tests).                           

 

             Lab Interpretation Abnormal                               



             (test code = 32880-1)                                        



[facility] (Na, K, Cl, CO2, 
Glucose, BUN, Creatinine, Ca)2022 16:16:20





             Test Item    Value        Reference Range Interpretation Comments

 

             NA (test code = 137 mmol/L   135-145                   



             [id number])                                         

 

             K (test code = 3.5 mmol/L   3.5-5                     



             6164551351)                                         

 

             CL (test code = 115 mmol/L          H            



             4023430010)                                         

 

             CO2 TOTAL (test code = 17 mmol/L    23-31        L            



             7335202711)                                         

 

             AGAP (test code =              2-16                      



             8121646603)                                         

 

             BUN (test code = 5 mg/dL      7-23         L            



             9213619644)                                         

 

             GLUCOSE (test code = 271 mg/dL           H            



             1762554387)                                         

 

             CREATININE (test code = 0.45 mg/dL   0.6-1.25     L            



             8569500214)                                         

 

             CALCIUM (test code = 8.5 mg/dL    8.6-10.6     L            



             9775946529)                                         

 

             eGFR (test code =              mL/min/1.73m2              



             1451739629)                                         

 

             MAL (test code = MAL) Association of                           



                          Glomerular Filtration                           



                          Rate (GFR) and Staging                           



                          of Kidney Disease*                           



                          +---------------------                           



                          --+-------------------                           



                          --+-------------------                           



                          ------+| GFR                           



                          (mL/min/1.73 m2) ?|                           



                          With Kidney Damage ?|                           



                          ?Without Kidney                           



                          Damage+---------------                           



                          --------+-------------                           



                          --------+-------------                           



                          ------------+| ?>90 ?                           



                          ? ? ? ? ? ? ? ?|                           



                          ?Stage one ? ? ? ? ?|                           



                          ? Normal ? ? ? ? ? ? ?                           



                          ?+--------------------                           



                          ---+------------------                           



                          ---+------------------                           



                          -------+| ?60-89 ? ? ?                           



                          ? ? ? ? ?| ?Stage two                           



                          ? ? ? ? ?| ? Decreased                           



                          GFR ? ? ? ?                            



                          +---------------------                           



                          --+-------------------                           



                          --+-------------------                           



                          ------+| ?30-59 ? ? ?                           



                          ? ? ? ? ?| ?Stage                           



                          three ? ? ? ?| ? Stage                           



                          three ? ? ? ? ?                           



                          +---------------------                           



                          --+-------------------                           



                          --+-------------------                           



                          ------+| ?15-29 ? ? ?                           



                          ? ? ? ? ?| ?Stage four                           



                          ? ? ? ? | ? Stage four                           



                          ? ? ? ? ?                              



                          ?+--------------------                           



                          ---+------------------                           



                          ---+------------------                           



                          -------+| ?<15 (or                           



                          dialysis) ? ?| ?Stage                           



                          five ? ? ? ? | ? Stage                           



                          five ? ? ? ? ?                           



                          ?+--------------------                           



                          ---+------------------                           



                          ---+------------------                           



                          -------+ *Each stage                           



                          assumes the associated                           



                          GFR level has been in                           



                          effect for at least                           



                          three months. ?Stages                           



                          1 to 5, with or                           



                          without kidney                           



                          disease, indicate                           



                          chronic kidney                           



                          disease. Notes:                           



                          Determination of                           



                          stages one and two                           



                          (with eGFR                             



                          >59mL/min/1.73 m2)                           



                          requires estimation of                           



                          kidney damage for at                           



                          least three months as                           



                          defined by structural                           



                          or functional                           



                          abnormalities of the                           



                          kidney, manifested by                           



                          either:Pathological                           



                          abnormalities or                           



                          Markers of kidney                           



                          damage (including                           



                          abnormalities in the                           



                          composition of the                           



                          blood or urine or                           



                          abnormalities in                           



                          imaging tests).                           

 

             Lab Interpretation Abnormal                               



             (test code = 26114-5)                                        



[facility] GLUCOSE (AUTOMATED)2022 14:21:27





             Test Item    Value        Reference Range Interpretation Comments

 

             POCT GLU (test code = 7944993199) 286 mg/dL           H      

      

 

             Lab Interpretation (test code = Abnormal                           

    



             61216-7)                                            



[facility] GLUCOSE (AUTOMATED)2022 14:21:27





             Test Item    Value        Reference Range Interpretation Comments

 

             POCT GLU (test code = 0366347896) 286 mg/dL           H      

      

 

             Lab Interpretation (test code = Abnormal                           

    



             23770-6)                                            



[facility] 
[id number] 13:29:25





             Test Item    Value        Reference Range Interpretation Comments

 

             Coagulase Negative Positive     Negative, See A            



             Staphylococcus (test              Comment/Narrative              



             code = 04590-6)                                        

 

             MAL (test code = MAL)  Coagulase negative                          

 



                          Staphylococcus (CoNS)                           



                          detected by DNA probe.                           



                          ?CoNS often                            



                          contaminate blood                           



                          cultures from skin                           



                          colonization during                           



                          phlebotomy. ?Preferred                           



                          management is to                           



                          repeat blood cultures,                           



                          and monitor off                           



                          antibiotics.                           



                          ?Contamination is                           



                          suggested by culture                           



                          growth after 48 hours,                           



                          or growth in single                           



                          culture (i.e., one of                           



                          two sets). ?True                           



                          bacteremia is                           



                          suggested by the                           



                          fever, hypotension,                           



                          and leukocytosis that                           



                          are not explained by                           



                          an alternative                           



                          infection, or                           



                          indwelling foreign                           



                          devices that appear                           



                          infected (catheters,                           



                          lines, or prostheses).                           



                          Consider Infectious                           



                          Diseases consultation                           



                          if differentiation of                           



                          CoNS bacteremia from                           



                          contamination is                           



                          uncertain. If clinical                           



                          context suggests true                           



                          bacteremia, preferred                           



                          therapy is vancomycin.                           



                          Please contact the                           



                          Antimicrobial                           



                          Stewardship Program                           



                          with questions.Pager:                           



                          [phone] Testing                           



                          included eleven                           



                          identification and                           



                          three resistance                           



                          marker                                 



                          targets.______________                           



                          ______________________                           



                          ______________________                           



                          _____________                           

 

             Lab Interpretation Abnormal                               



             (test code = 34026-8)                                        



[facility] 
[id number] 13:29:25





             Test Item    Value        Reference Range Interpretation Comments

 

             Coagulase Negative Positive     Negative, See A            



             Staphylococcus (test              Comment/Narrative              



             code = 51734-9)                                        

 

             MAL (test code = MAL)  Coagulase negative                          

 



                          Staphylococcus (CoNS)                           



                          detected by DNA probe.                           



                          ?CoNS often                            



                          contaminate blood                           



                          cultures from skin                           



                          colonization during                           



                          phlebotomy. ?Preferred                           



                          management is to                           



                          repeat blood cultures,                           



                          and monitor off                           



                          antibiotics.                           



                          ?Contamination is                           



                          suggested by culture                           



                          growth after 48 hours,                           



                          or growth in single                           



                          culture (i.e., one of                           



                          two sets). ?True                           



                          bacteremia is                           



                          suggested by the                           



                          fever, hypotension,                           



                          and leukocytosis that                           



                          are not explained by                           



                          an alternative                           



                          infection, or                           



                          indwelling foreign                           



                          devices that appear                           



                          infected (catheters,                           



                          lines, or prostheses).                           



                          Consider Infectious                           



                          Diseases consultation                           



                          if differentiation of                           



                          CoNS bacteremia from                           



                          contamination is                           



                          uncertain. If clinical                           



                          context suggests true                           



                          bacteremia, preferred                           



                          therapy is vancomycin.                           



                          Please contact the                           



                          Antimicrobial                           



                          Stewardship Program                           



                          with questions.Pager:                           



                          [phone] Testing                           



                          included eleven                           



                          identification and                           



                          three resistance                           



                          marker                                 



                          targets.______________                           



                          ______________________                           



                          ______________________                           



                          _____________                           

 

             Lab Interpretation Abnormal                               



             (test code = 14153-7)                                        



[facility] 
[id number] 13:29:25





             Test Item    Value        Reference Range Interpretation Comments

 

             Coagulase Negative Positive     Negative, See A            



             Staphylococcus (test              Comment/Narrative              



             code = 86280-8)                                        

 

             MAL (test code = MAL)  Coagulase negative                          

 



                          Staphylococcus (CoNS)                           



                          detected by DNA probe.                           



                          ?CoNS often                            



                          contaminate blood                           



                          cultures from skin                           



                          colonization during                           



                          phlebotomy. ?Preferred                           



                          management is to                           



                          repeat blood cultures,                           



                          and monitor off                           



                          antibiotics.                           



                          ?Contamination is                           



                          suggested by culture                           



                          growth after 48 hours,                           



                          or growth in single                           



                          culture (i.e., one of                           



                          two sets). ?True                           



                          bacteremia is                           



                          suggested by the                           



                          fever, hypotension,                           



                          and leukocytosis that                           



                          are not explained by                           



                          an alternative                           



                          infection, or                           



                          indwelling foreign                           



                          devices that appear                           



                          infected (catheters,                           



                          lines, or prostheses).                           



                          Consider Infectious                           



                          Diseases consultation                           



                          if differentiation of                           



                          CoNS bacteremia from                           



                          contamination is                           



                          uncertain. If clinical                           



                          context suggests true                           



                          bacteremia, preferred                           



                          therapy is vancomycin.                           



                          Please contact the                           



                          Antimicrobial                           



                          Stewardship Program                           



                          with questions.Pager:                           



                          [phone] Testing                           



                          included eleven                           



                          identification and                           



                          three resistance                           



                          marker                                 



                          targets.______________                           



                          ______________________                           



                          ______________________                           



                          _____________                           

 

             Lab Interpretation Abnormal                               



             (test code = 69141-9)                                        



[facility] GLUCOSE (AUTOMATED)2022 13:03:22





             Test Item    Value        Reference Range Interpretation Comments

 

             POCT GLU (test code = 8387068590) 307 mg/dL           H      

      

 

             Lab Interpretation (test code = Abnormal                           

    



             76327-0)                                            



[facility] GLUCOSE (AUTOMATED)2022 13:03:22





             Test Item    Value        Reference Range Interpretation Comments

 

             POCT GLU (test code = 3304898297) 307 mg/dL           H      

      

 

             Lab Interpretation (test code = Abnormal                           

    



             84760-2)                                            



[facility] GLUCOSE (AUTOMATED)2022 09:05:19





             Test Item    Value        Reference Range Interpretation Comments

 

             POCT GLU (test code = 9357580568) 326 mg/dL           H      

      

 

             Lab Interpretation (test code = Abnormal                           

    



             72612-6)                                            



[facility] GLUCOSE (AUTOMATED)2022 09:05:19





             Test Item    Value        Reference Range Interpretation Comments

 

             POCT GLU (test code = 9378507431) 326 mg/dL           H      

      

 

             Lab Interpretation (test code = Abnormal                           

    



             39818-4)                                            



[facility] GLUCOSE (AUTOMATED)2022 05:46:58





             Test Item    Value        Reference Range Interpretation Comments

 

             POCT GLU (test code = 1890826962) 341 mg/dL           H      

      

 

             Lab Interpretation (test code = Abnormal                           

    



             56460-6)                                            



[facility] GLUCOSE (AUTOMATED)2022 05:46:58





             Test Item    Value        Reference Range Interpretation Comments

 

             POCT GLU (test code = 1753528717) 341 mg/dL           H      

      

 

             Lab Interpretation (test code = Abnormal                           

    



             43199-5)                                            



[facility] GLUCOSE (AUTOMATED)2022 01:37:20





             Test Item    Value        Reference Range Interpretation Comments

 

             POCT GLU (test code = 5829062339) 196 mg/dL           H      

      

 

             Lab Interpretation (test code = Abnormal                           

    



             73239-0)                                            



[facility] GLUCOSE (AUTOMATED)2022 01:37:20





             Test Item    Value        Reference Range Interpretation Comments

 

             POCT GLU (test code = 4949183819) 196 mg/dL           H      

      

 

             Lab Interpretation (test code = Abnormal                           

    



             92698-8)                                            



[facility] GLUCOSE (AUTOMATED)2022 23:44:41





             Test Item    Value        Reference Range Interpretation Comments

 

             POCT GLU (test code = 2018983853) 138 mg/dL           H      

      

 

             Lab Interpretation (test code = Abnormal                           

    



             40872-9)                                            



[facility] GLUCOSE (AUTOMATED)2022 23:44:41





             Test Item    Value        Reference Range Interpretation Comments

 

             POCT GLU (test code = 9485133057) 138 mg/dL           H      

      

 

             Lab Interpretation (test code = Abnormal                           

    



             61584-4)                                            



[facility] Metabolic Panel (Na, K, Cl, CO2, 
Glucose, BUN, Creatinine, Ca)2022 23:12:45





             Test Item    Value        Reference Range Interpretation Comments

 

             NA (test code = 138 mmol/L   135-145                   



             7376620666)                                         

 

             K (test code = 3.7 mmol/L   3.5-5                     



             1294751411)                                         

 

             CL (test code = 115 mmol/L          H            



             7095150301)                                         

 

             CO2 TOTAL (test code = 17 mmol/L    23-31        L            



             9551280226)                                         

 

             AGAP (test code =              2-16                      



             5972694130)                                         

 

             BUN (test code = 5 mg/dL      7-23         L            



             9723238256)                                         

 

             GLUCOSE (test code = 86 mg/dL                         



             2770331109)                                         

 

             CREATININE (test code = 0.46 mg/dL   0.6-1.25     L            



             5232589265)                                         

 

             CALCIUM (test code = 8.4 mg/dL    8.6-10.6     L            



             7085411241)                                         

 

             eGFR (test code =              mL/min/1.73m2              



             7540818644)                                         

 

             MAL (test code = MAL) Association of                           



                          Glomerular Filtration                           



                          Rate (GFR) and Staging                           



                          of Kidney Disease*                           



                          +---------------------                           



                          --+-------------------                           



                          --+-------------------                           



                          ------+| GFR                           



                          (mL/min/1.73 m2) ?|                           



                          With Kidney Damage ?|                           



                          ?Without Kidney                           



                          Damage+---------------                           



                          --------+-------------                           



                          --------+-------------                           



                          ------------+| ?>90 ?                           



                          ? ? ? ? ? ? ? ?|                           



                          ?Stage one ? ? ? ? ?|                           



                          ? Normal ? ? ? ? ? ? ?                           



                          ?+--------------------                           



                          ---+------------------                           



                          ---+------------------                           



                          -------+| ?60-89 ? ? ?                           



                          ? ? ? ? ?| ?Stage two                           



                          ? ? ? ? ?| ? Decreased                           



                          GFR ? ? ? ?                            



                          +---------------------                           



                          --+-------------------                           



                          --+-------------------                           



                          ------+| ?30-59 ? ? ?                           



                          ? ? ? ? ?| ?Stage                           



                          three ? ? ? ?| ? Stage                           



                          three ? ? ? ? ?                           



                          +---------------------                           



                          --+-------------------                           



                          --+-------------------                           



                          ------+| ?15-29 ? ? ?                           



                          ? ? ? ? ?| ?Stage four                           



                          ? ? ? ? | ? Stage four                           



                          ? ? ? ? ?                              



                          ?+--------------------                           



                          ---+------------------                           



                          ---+------------------                           



                          -------+| ?<15 (or                           



                          dialysis) ? ?| ?Stage                           



                          five ? ? ? ? | ? Stage                           



                          five ? ? ? ? ?                           



                          ?+--------------------                           



                          ---+------------------                           



                          ---+------------------                           



                          -------+ *Each stage                           



                          assumes the associated                           



                          GFR level has been in                           



                          effect for at least                           



                          three months. ?Stages                           



                          1 to 5, with or                           



                          without kidney                           



                          disease, indicate                           



                          chronic kidney                           



                          disease. Notes:                           



                          Determination of                           



                          stages one and two                           



                          (with eGFR                             



                          >59mL/min/1.73 m2)                           



                          requires estimation of                           



                          kidney damage for at                           



                          least three months as                           



                          defined by structural                           



                          or functional                           



                          abnormalities of the                           



                          kidney, manifested by                           



                          either:Pathological                           



                          abnormalities or                           



                          Markers of kidney                           



                          damage (including                           



                          abnormalities in the                           



                          composition of the                           



                          blood or urine or                           



                          abnormalities in                           



                          imaging tests).                           

 

             Lab Interpretation Abnormal                               



             (test code = 95479-2)                                        



[facility] Metabolic Panel (Na, K, Cl, CO2, 
Glucose, BUN, Creatinine, Ca)2022 23:12:45





             Test Item    Value        Reference Range Interpretation Comments

 

             NA (test code = 138 mmol/L   135-145                   



             2304750555)                                         

 

             K (test code = 3.7 mmol/L   3.5-5                     



             3896821222)                                         

 

             CL (test code = 115 mmol/L          H            



             2635413484)                                         

 

             CO2 TOTAL (test code = 17 mmol/L    23-31        L            



             9379274613)                                         

 

             AGAP (test code =              2-16                      



             4005850061)                                         

 

             BUN (test code = 5 mg/dL      7-23         L            



             5938875238)                                         

 

             GLUCOSE (test code = 86 mg/dL                         



             7752129979)                                         

 

             CREATININE (test code = 0.46 mg/dL   0.6-1.25     L            



             6704694516)                                         

 

             CALCIUM (test code = 8.4 mg/dL    8.6-10.6     L            



             7387490618)                                         

 

             eGFR (test code =              mL/min/1.73m2              



             9005265271)                                         

 

             MAL (test code = MAL) Association of                           



                          Glomerular Filtration                           



                          Rate (GFR) and Staging                           



                          of Kidney Disease*                           



                          +---------------------                           



                          --+-------------------                           



                          --+-------------------                           



                          ------+| GFR                           



                          (mL/min/1.73 m2) ?|                           



                          With Kidney Damage ?|                           



                          ?Without Kidney                           



                          Damage+---------------                           



                          --------+-------------                           



                          --------+-------------                           



                          ------------+| ?>90 ?                           



                          ? ? ? ? ? ? ? ?|                           



                          ?Stage one ? ? ? ? ?|                           



                          ? Normal ? ? ? ? ? ? ?                           



                          ?+--------------------                           



                          ---+------------------                           



                          ---+------------------                           



                          -------+| ?60-89 ? ? ?                           



                          ? ? ? ? ?| ?Stage two                           



                          ? ? ? ? ?| ? Decreased                           



                          GFR ? ? ? ?                            



                          +---------------------                           



                          --+-------------------                           



                          --+-------------------                           



                          ------+| ?30-59 ? ? ?                           



                          ? ? ? ? ?| ?Stage                           



                          three ? ? ? ?| ? Stage                           



                          three ? ? ? ? ?                           



                          +---------------------                           



                          --+-------------------                           



                          --+-------------------                           



                          ------+| ?15-29 ? ? ?                           



                          ? ? ? ? ?| ?Stage four                           



                          ? ? ? ? | ? Stage four                           



                          ? ? ? ? ?                              



                          ?+--------------------                           



                          ---+------------------                           



                          ---+------------------                           



                          -------+| ?<15 (or                           



                          dialysis) ? ?| ?Stage                           



                          five ? ? ? ? | ? Stage                           



                          five ? ? ? ? ?                           



                          ?+--------------------                           



                          ---+------------------                           



                          ---+------------------                           



                          -------+ *Each stage                           



                          assumes the associated                           



                          GFR level has been in                           



                          effect for at least                           



                          three months. ?Stages                           



                          1 to 5, with or                           



                          without kidney                           



                          disease, indicate                           



                          chronic kidney                           



                          disease. Notes:                           



                          Determination of                           



                          stages one and two                           



                          (with eGFR                             



                          >59mL/min/1.73 m2)                           



                          requires estimation of                           



                          kidney damage for at                           



                          least three months as                           



                          defined by structural                           



                          or functional                           



                          abnormalities of the                           



                          kidney, manifested by                           



                          either:Pathological                           



                          abnormalities or                           



                          Markers of kidney                           



                          damage (including                           



                          abnormalities in the                           



                          composition of the                           



                          blood or urine or                           



                          abnormalities in                           



                          imaging tests).                           

 

             Lab Interpretation Abnormal                               



             (test code = 89522-1)                                        



[facility] GLUCOSE (AUTOMATED)2022 22:38:52





             Test Item    Value        Reference Range Interpretation Comments

 

             POCT GLU (test code = 6517803272) 94 mg/dL                   

      

 

             Lab Interpretation (test code = Normal                             

    



             52607-3)                                            



[facility] GLUCOSE (AUTOMATED)2022 22:38:52





             Test Item    Value        Reference Range Interpretation Comments

 

             POCT GLU (test code = 9822056372) 94 mg/dL                   

      

 

             Lab Interpretation (test code = Normal                             

    



             79619-6)                                            



[facility] GLUCOSE (AUTOMATED)2022 21:31:35





             Test Item    Value        Reference Range Interpretation Comments

 

             POCT GLU (test code = 8774702612) 119 mg/dL           H      

      

 

             Lab Interpretation (test code = Abnormal                           

    



             12495-0)                                            



[facility] GLUCOSE (AUTOMATED)2022 21:31:35





             Test Item    Value        Reference Range Interpretation Comments

 

             POCT GLU (test code = 6545656938) 119 mg/dL           H      

      

 

             Lab Interpretation (test code = Abnormal                           

    



             37139-8)                                            



[facility] GLUCOSE (AUTOMATED)2022 20:06:03





             Test Item    Value        Reference Range Interpretation Comments

 

             POCT GLU (test code = 3458883368) 177 mg/dL           H      

      

 

             Lab Interpretation (test code = Abnormal                           

    



             07200-9)                                            



[facility] GLUCOSE (AUTOMATED)2022 20:06:03





             Test Item    Value        Reference Range Interpretation Comments

 

             POCT GLU (test code = 1404698127) 177 mg/dL           H      

      

 

             Lab Interpretation (test code = Abnormal                           

    



             70693-5)                                            



[facility] GLUCOSE (AUTOMATED)2022 19:07:20





             Test Item    Value        Reference Range Interpretation Comments

 

             POCT GLU (test code = 9237819834) 185 mg/dL           H      

      

 

             Lab Interpretation (test code = Abnormal                           

    



             14193-3)                                            



[facility]





             Test Item    Value        Reference Range Interpretation Comments

 

             POCT GLU (test code = 2465110414) 185 mg/dL           H      

      

 

             Lab Interpretation (test code = Abnormal                           

    



             80464-6)                                            



[facility] -82-33 18:44:53





             Test Item    Value        Reference    Interpretation Comments



                                       Range                     

 

             TROPONIN I (test 0.000 ng/mL  See_Comment                [Automated



             code = 4855983920)                                        message] 

The



                                                                 system which



                                                                 generated this



                                                                 result



                                                                 transmitted



                                                                 reference range

:



                                                                 <=0.034. The



                                                                 reference range



                                                                 was not used to



                                                                 interpret this



                                                                 result as



                                                                 normal/abnormal

.

 

             MAL (test code = Reference (Normal)                           



             MAL)         Range (defined by                           



                          the 99th percentile                           



                          reference limit): <=                           



                          0.034 ng/mL Note:                           



                          Cardiac troponin                           



                          begins to rise 3-4                           



                          hours after the                           



                          onset of ischemia.                           



                          Repeat in 4-6 hours                           



                          if the sample was                           



                          drawn within 3-4                           



                          hours of the onset                           



                          of the symptom and                           



                          found normal.                           



                          Diagnosis of                           



                          myocardial injury is                           



                          made with acute                           



                          changes in cTn                           



                          concentrations with                           



                          at least one serial                           



                          sample above the                           



                          99th percentile                           



                          upper reference                           



                          limit (URL), taken                           



                          together with the                           



                          patient's clinical                           



                          presentation. Biotin                           



                          has been reported to                           



                          cause a negative                           



                          bias, interpret                           



                          results relative to                           



                          patient's use of                           



                          biotin.                                

 

             Lab Interpretation Normal                                 



             (test code =                                        



             80186-2)                                            



[facility] -40-24 18:44:53





             Test Item    Value        Reference    Interpretation Comments



                                       Range                     

 

             TROPONIN I (test 0.000 ng/mL  See_Comment                [Automated



             code = 1811907161)                                        message] 

The



                                                                 system which



                                                                 generated this



                                                                 result



                                                                 transmitted



                                                                 reference range

:



                                                                 <=0.034. The



                                                                 reference range



                                                                 was not used to



                                                                 interpret this



                                                                 result as



                                                                 normal/abnormal

.

 

             MAL (test code = Reference (Normal)                           



             MAL)         Range (defined by                           



                          the 99th percentile                           



                          reference limit): <=                           



                          0.034 ng/mL Note:                           



                          Cardiac troponin                           



                          begins to rise 3-4                           



                          hours after the                           



                          onset of ischemia.                           



                          Repeat in 4-6 hours                           



                          if the sample was                           



                          drawn within 3-4                           



                          hours of the onset                           



                          of the symptom and                           



                          found normal.                           



                          Diagnosis of                           



                          myocardial injury is                           



                          made with acute                           



                          changes in cTn                           



                          concentrations with                           



                          at least one serial                           



                          sample above the                           



                          99th percentile                           



                          upper reference                           



                          limit (URL), taken                           



                          together with the                           



                          patient's clinical                           



                          presentation. Biotin                           



                          has been reported to                           



                          cause a negative                           



                          bias, interpret                           



                          results relative to                           



                          patient's use of                           



                          biotin.                                

 

             Lab Interpretation Normal                                 



             (test code =                                        



             27362-8)                                            



[facility]53





             Test Item    Value        Reference    Interpretation Comments



                                       Range                     

 

             TROPONIN I (test 0.000 ng/mL  See_Comment                [Automated



             code = 5495121413)                                        message] 

The



                                                                 system which



                                                                 generated this



                                                                 result



                                                                 transmitted



                                                                 reference range

:



                                                                 <=0.034. The



                                                                 reference range



                                                                 was not used to



                                                                 interpret this



                                                                 result as



                                                                 normal/abnormal

.

 

             MAL (test code = Reference (Normal)                           



             AML)         Range (defined by                           



                          the 99th percentile                           



                          reference limit): <=                           



                          0.034 ng/mL Note:                           



                          Cardiac troponin                           



                          begins to rise 3-4                           



                          hours after the                           



                          onset of ischemia.                           



                          Repeat in 4-6 hours                           



                          if the sample was                           



                          drawn within 3-4                           



                          hours of the onset                           



                          of the symptom and                           



                          found normal.                           



                          Diagnosis of                           



                          myocardial injury is                           



                          made with acute                           



                          changes in cTn                           



                          concentrations with                           



                          at least one serial                           



                          sample above the                           



                          99th percentile                           



                          upper reference                           



                          limit (URL), taken                           



                          together with the                           



                          patient's clinical                           



                          presentation. Biotin                           



                          has been reported to                           



                          cause a negative                           



                          bias, interpret                           



                          results relative to                           



                          patient's use of                           



                          biotin.                                

 

             Lab Interpretation Normal                                 



             (test code =                                        



             02773-2)                                            



[facility] (Na, K, Cl, CO2, 
Glucose, BUN, Creatinine, Ca)2022 18:27:49





             Test Item    Value        Reference Range Interpretation Comments

 

             NA (test code = 138 mmol/L   135-145                   



             9221389293)                                         

 

             K (test code = 3.7 mmol/L   3.5-5                     



             6254440995)                                         

 

             CL (test code = 115 mmol/L          H            



             3638218267)                                         

 

             CO2 TOTAL (test code = 16 mmol/L    23-31        L            



             4664438376)                                         

 

             AGAP (test code =              2-16                      



             0663486817)                                         

 

             BUN (test code = 7 mg/dL      7-23                      



             2147187927)                                         

 

             GLUCOSE (test code = 195 mg/dL           H            



             6053441165)                                         

 

             CREATININE (test code = 0.52 mg/dL   0.6-1.25     L            



             6840566957)                                         

 

             CALCIUM (test code = 8.2 mg/dL    8.6-10.6     L            



             3400001729)                                         

 

             eGFR (test code =              mL/min/1.73m2              



             1423535069)                                         

 

             MAL (test code = MAL) Association of                           



                          Glomerular Filtration                           



                          Rate (GFR) and Staging                           



                          of Kidney Disease*                           



                          +---------------------                           



                          --+-------------------                           



                          --+-------------------                           



                          ------+| GFR                           



                          (mL/min/1.73 m2) ?|                           



                          With Kidney Damage ?|                           



                          ?Without Kidney                           



                          Damage+---------------                           



                          --------+-------------                           



                          --------+-------------                           



                          ------------+| ?>90 ?                           



                          ? ? ? ? ? ? ? ?|                           



                          ?Stage one ? ? ? ? ?|                           



                          ? Normal ? ? ? ? ? ? ?                           



                          ?+--------------------                           



                          ---+------------------                           



                          ---+------------------                           



                          -------+| ?60-89 ? ? ?                           



                          ? ? ? ? ?| ?Stage two                           



                          ? ? ? ? ?| ? Decreased                           



                          GFR ? ? ? ?                            



                          +---------------------                           



                          --+-------------------                           



                          --+-------------------                           



                          ------+| ?30-59 ? ? ?                           



                          ? ? ? ? ?| ?Stage                           



                          three ? ? ? ?| ? Stage                           



                          three ? ? ? ? ?                           



                          +---------------------                           



                          --+-------------------                           



                          --+-------------------                           



                          ------+| ?15-29 ? ? ?                           



                          ? ? ? ? ?| ?Stage four                           



                          ? ? ? ? | ? Stage four                           



                          ? ? ? ? ?                              



                          ?+--------------------                           



                          ---+------------------                           



                          ---+------------------                           



                          -------+| ?<15 (or                           



                          dialysis) ? ?| ?Stage                           



                          five ? ? ? ? | ? Stage                           



                          five ? ? ? ? ?                           



                          ?+--------------------                           



                          ---+------------------                           



                          ---+------------------                           



                          -------+ *Each stage                           



                          assumes the associated                           



                          GFR level has been in                           



                          effect for at least                           



                          three months. ?Stages                           



                          1 to 5, with or                           



                          without kidney                           



                          disease, indicate                           



                          chronic kidney                           



                          disease. Notes:                           



                          Determination of                           



                          stages one and two                           



                          (with eGFR                             



                          >59mL/min/1.73 m2)                           



                          requires estimation of                           



                          kidney damage for at                           



                          least three months as                           



                          defined by structural                           



                          or functional                           



                          abnormalities of the                           



                          kidney, manifested by                           



                          either:Pathological                           



                          abnormalities or                           



                          Markers of kidney                           



                          damage (including                           



                          abnormalities in the                           



                          composition of the                           



                          blood or urine or                           



                          abnormalities in                           



                          imaging tests).                           

 

             Lab Interpretation Abnormal                               



             (test code = 23807-1)                                        



[facility] Metabolic Panel (Na, K, Cl, CO2, 
Glucose, BUN, Creatinine, Ca)2022 18:27:49





             Test Item    Value        Reference Range Interpretation Comments

 

             NA (test code = 138 mmol/L   135-145                   



             5246825612)                                         

 

             K (test code = 3.7 mmol/L   3.5-5                     



             6949582276)                                         

 

             CL (test code = 115 mmol/L          H            



             6956818461)                                         

 

             CO2 TOTAL (test code = 16 mmol/L    23-31        L            



             4472201483)                                         

 

             AGAP (test code =              2-16                      



             1320396192)                                         

 

             BUN (test code = 7 mg/dL      7-23                      



             5700127241)                                         

 

             GLUCOSE (test code = 195 mg/dL           H            



             [id number])                                         

 

             CREATININE (test code = 0.52 mg/dL   0.6-1.25     L            



             3615610192)                                         

 

             CALCIUM (test code = 8.2 mg/dL    8.6-10.6     L            



             4229641588)                                         

 

             eGFR (test code =              mL/min/1.73m2              



             1648876888)                                         

 

             MAL (test code = MAL) Association of                           



                          Glomerular Filtration                           



                          Rate (GFR) and Staging                           



                          of Kidney Disease*                           



                          +---------------------                           



                          --+-------------------                           



                          --+-------------------                           



                          ------+| GFR                           



                          (mL/min/1.73 m2) ?|                           



                          With Kidney Damage ?|                           



                          ?Without Kidney                           



                          Damage+---------------                           



                          --------+-------------                           



                          --------+-------------                           



                          ------------+| ?>90 ?                           



                          ? ? ? ? ? ? ? ?|                           



                          ?Stage one ? ? ? ? ?|                           



                          ? Normal ? ? ? ? ? ? ?                           



                          ?+--------------------                           



                          ---+------------------                           



                          ---+------------------                           



                          -------+| ?60-89 ? ? ?                           



                          ? ? ? ? ?| ?Stage two                           



                          ? ? ? ? ?| ? Decreased                           



                          GFR ? ? ? ?                            



                          +---------------------                           



                          --+-------------------                           



                          --+-------------------                           



                          ------+| ?30-59 ? ? ?                           



                          ? ? ? ? ?| ?Stage                           



                          three ? ? ? ?| ? Stage                           



                          three ? ? ? ? ?                           



                          +---------------------                           



                          --+-------------------                           



                          --+-------------------                           



                          ------+| ?15-29 ? ? ?                           



                          ? ? ? ? ?| ?Stage four                           



                          ? ? ? ? | ? Stage four                           



                          ? ? ? ? ?                              



                          ?+--------------------                           



                          ---+------------------                           



                          ---+------------------                           



                          -------+| ?<15 (or                           



                          dialysis) ? ?| ?Stage                           



                          five ? ? ? ? | ? Stage                           



                          five ? ? ? ? ?                           



                          ?+--------------------                           



                          ---+------------------                           



                          ---+------------------                           



                          -------+ *Each stage                           



                          assumes the associated                           



                          GFR level has been in                           



                          effect for at least                           



                          three months. ?Stages                           



                          1 to 5, with or                           



                          without kidney                           



                          disease, indicate                           



                          chronic kidney                           



                          disease. Notes:                           



                          Determination of                           



                          stages one and two                           



                          (with eGFR                             



                          >59mL/min/1.73 m2)                           



                          requires estimation of                           



                          kidney damage for at                           



                          least three months as                           



                          defined by structural                           



                          or functional                           



                          abnormalities of the                           



                          kidney, manifested by                           



                          either:Pathological                           



                          abnormalities or                           



                          Markers of kidney                           



                          damage (including                           



                          abnormalities in the                           



                          composition of the                           



                          blood or urine or                           



                          abnormalities in                           



                          imaging tests).                           

 

             Lab Interpretation Abnormal                               



             (test code = 40553-2)                                        



[facility]





             Test Item    Value        Reference Range Interpretation Comments

 

             BOH (test code = 3.3 mmol/L                             



             7419294170)                                         

 

             MAL (test code = Normal Ranges: ? ?                           



             MAL)         Nonfasting ? ? ? ? ? Less                           



                          than 0.1 mmol/L ? ?                           



                          Overnight Fast ? ? ? Less                           



                          than 0.4 mmol/L ? ? Fasting                           



                          (1-2 weeks) ?6-8 mmol/L Test                          

 



                          developed and                           



                          characteristics determined                           



                          by [facility].                          

 



[facility] 17[facility]43:01





             Test Item    Value        Reference Range Interpretation Comments

 

             BOH (test code = 3.3 mmol/L                             



             2033040045)                                         

 

             MAL (test code = Normal Ranges: ? ?                           



             MAL)         Nonfasting ? ? ? ? ? Less                           



                          than 0.1 mmol/L ? ?                           



                          Overnight Fast ? ? ? Less                           



                          than 0.4 mmol/L ? ? Fasting                           



                          (1-2 weeks) ?6-8 mmol/L Test                          

 



                          developed and                           



                          characteristics determined                           



                          by [facility].                          

 



[facility] GLUCOSE (AUTOMATED)2022 17:29:05





             Test Item    Value        Reference Range Interpretation Comments

 

             POCT GLU (test code = 6472079122) 192 mg/dL           H      

      

 

             Lab Interpretation (test code = Abnormal                           

    



             77905-6)                                            



[facility] GLUCOSE (AUTOMATED)2022 17:29:05





             Test Item    Value        Reference Range Interpretation Comments

 

             POCT GLU (test code = 2209634669) 192 mg/dL           H      

      

 

             Lab Interpretation (test code = Abnormal                           

    



             86910-9)                                            



[facility] 16:28:42





             Test Item    Value        Reference Range Interpretation Comments

 

             OSMOLALITY (test code =              See_Comment  H             [Au

tomated message]



             2692-2)                                             The system [employer]



                                                                 generated this 

result



                                                                 transmitted ref

erence



                                                                 range: 278 - 30

5



                                                                 mOsm/kg. The



                                                                 reference range

 was



                                                                 not used to int

erpret



                                                                 this result as



                                                                 normal/abnormal

.

 

             Lab Interpretation (test Abnormal                               



             code = 21912-7)                                        



[facility] Awnkl5710-29-06 16:28:42





             Test Item    Value        Reference Range Interpretation Comments

 

             OSMOLALITY (test code =              See_Comment  H             [Au

tomated message]



             2692-2)                                             The system [employer]



                                                                 generated this 

result



                                                                 transmitted ref

erence



                                                                 range: 278 - 30

5



                                                                 mOsm/kg. The



                                                                 reference range

 was



                                                                 not used to int

erpret



                                                                 this result as



                                                                 normal/abnormal

.

 

             Lab Interpretation (test Abnormal                               



             code = 81816-6)                                        



[facility]4346[facility]49-38 16:28:42





             Test Item    Value        Reference Range Interpretation Comments

 

             OSMOLALITY (test code =              See_Comment  H             [Au

tomated message]



             2692-2)                                             The system [employer]



                                                                 generated this 

result



                                                                 transmitted ref

erence



                                                                 range: 278 - 30

5



                                                                 mOsm/kg. The



                                                                 reference range

 was



                                                                 not used to int

erpret



                                                                 this result as



                                                                 normal/abnormal

.

 

             Lab Interpretation (test Abnormal                               



             code = 98836-0)                                        



[facility] Metabolic Panel (Na, K, Cl, CO2, 
Glucose, BUN, Creatinine, Ca)2022 16:02:27





             Test Item    Value        Reference Range Interpretation Comments

 

             NA (test code = 138 mmol/L   135-145                   



             [id number])                                         

 

             K (test code = 3.7 mmol/L   3.5-5                     



             5578741403)                                         

 

             CL (test code = 117 mmol/L          H            



             1152716236)                                         

 

             CO2 TOTAL (test code = 15 mmol/L    23-31        L            



             4476053417)                                         

 

             AGAP (test code =              2-16                      



             7307315236)                                         

 

             BUN (test code = 7 mg/dL      7-23                      



             6535709621)                                         

 

             GLUCOSE (test code = 204 mg/dL           H            



             4576711802)                                         

 

             CREATININE (test code = 0.41 mg/dL   0.6-1.25     L            



             0333478464)                                         

 

             CALCIUM (test code = 8.3 mg/dL    8.6-10.6     L            



             7592822504)                                         

 

             eGFR (test code =              mL/min/1.73m2              



             0105147851)                                         

 

             MAL (test code = MAL) Association of                           



                          Glomerular Filtration                           



                          Rate (GFR) and Staging                           



                          of Kidney Disease*                           



                          +---------------------                           



                          --+-------------------                           



                          --+-------------------                           



                          ------+| GFR                           



                          (mL/min/1.73 m2) ?|                           



                          With Kidney Damage ?|                           



                          ?Without Kidney                           



                          Damage+---------------                           



                          --------+-------------                           



                          --------+-------------                           



                          ------------+| ?>90 ?                           



                          ? ? ? ? ? ? ? ?|                           



                          ?Stage one ? ? ? ? ?|                           



                          ? Normal ? ? ? ? ? ? ?                           



                          ?+--------------------                           



                          ---+------------------                           



                          ---+------------------                           



                          -------+| ?60-89 ? ? ?                           



                          ? ? ? ? ?| ?Stage two                           



                          ? ? ? ? ?| ? Decreased                           



                          GFR ? ? ? ?                            



                          +---------------------                           



                          --+-------------------                           



                          --+-------------------                           



                          ------+| ?30-59 ? ? ?                           



                          ? ? ? ? ?| ?Stage                           



                          three ? ? ? ?| ? Stage                           



                          three ? ? ? ? ?                           



                          +---------------------                           



                          --+-------------------                           



                          --+-------------------                           



                          ------+| ?15-29 ? ? ?                           



                          ? ? ? ? ?| ?Stage four                           



                          ? ? ? ? | ? Stage four                           



                          ? ? ? ? ?                              



                          ?+--------------------                           



                          ---+------------------                           



                          ---+------------------                           



                          -------+| ?<15 (or                           



                          dialysis) ? ?| ?Stage                           



                          five ? ? ? ? | ? Stage                           



                          five ? ? ? ? ?                           



                          ?+--------------------                           



                          ---+------------------                           



                          ---+------------------                           



                          -------+ *Each stage                           



                          assumes the associated                           



                          GFR level has been in                           



                          effect for at least                           



                          three months. ?Stages                           



                          1 to 5, with or                           



                          without kidney                           



                          disease, indicate                           



                          chronic kidney                           



                          disease. Notes:                           



                          Determination of                           



                          stages one and two                           



                          (with eGFR                             



                          >59mL/min/1.73 m2)                           



                          requires estimation of                           



                          kidney damage for at                           



                          least three months as                           



                          defined by structural                           



                          or functional                           



                          abnormalities of the                           



                          kidney, manifested by                           



                          either:Pathological                           



                          abnormalities or                           



                          Markers of kidney                           



                          damage (including                           



                          abnormalities in the                           



                          composition of the                           



                          blood or urine or                           



                          abnormalities in                           



                          imaging tests).                           

 

             Lab Interpretation Abnormal                               



             (test code = 82205-0)                                        



[facility] Metabolic Panel (Na, K, Cl, CO2, 
Glucose, BUN, Creatinine, Ca)2022 16:02:27





             Test Item    Value        Reference Range Interpretation Comments

 

             NA (test code = 138 mmol/L   135-145                   



             [id number])                                         

 

             K (test code = 3.7 mmol/L   3.5-5                     



             0306075458)                                         

 

             CL (test code = 117 mmol/L          H            



             5830904983)                                         

 

             CO2 TOTAL (test code = 15 mmol/L    23-31        L            



             3856677008)                                         

 

             AGAP (test code =              2-16                      



             3895857315)                                         

 

             BUN (test code = 7 mg/dL      7-23                      



             0032436413)                                         

 

             GLUCOSE (test code = 204 mg/dL           H            



             3168546205)                                         

 

             CREATININE (test code = 0.41 mg/dL   0.6-1.25     L            



             2579018788)                                         

 

             CALCIUM (test code = 8.3 mg/dL    8.6-10.6     L            



             8970961921)                                         

 

             eGFR (test code =              mL/min/1.73m2              



             4460677878)                                         

 

             MAL (test code = MAL) Association of                           



                          Glomerular Filtration                           



                          Rate (GFR) and Staging                           



                          of Kidney Disease*                           



                          +---------------------                           



                          --+-------------------                           



                          --+-------------------                           



                          ------+| GFR                           



                          (mL/min/1.73 m2) ?|                           



                          With Kidney Damage ?|                           



                          ?Without Kidney                           



                          Damage+---------------                           



                          --------+-------------                           



                          --------+-------------                           



                          ------------+| ?>90 ?                           



                          ? ? ? ? ? ? ? ?|                           



                          ?Stage one ? ? ? ? ?|                           



                          ? Normal ? ? ? ? ? ? ?                           



                          ?+--------------------                           



                          ---+------------------                           



                          ---+------------------                           



                          -------+| ?60-89 ? ? ?                           



                          ? ? ? ? ?| ?Stage two                           



                          ? ? ? ? ?| ? Decreased                           



                          GFR ? ? ? ?                            



                          +---------------------                           



                          --+-------------------                           



                          --+-------------------                           



                          ------+| ?30-59 ? ? ?                           



                          ? ? ? ? ?| ?Stage                           



                          three ? ? ? ?| ? Stage                           



                          three ? ? ? ? ?                           



                          +---------------------                           



                          --+-------------------                           



                          --+-------------------                           



                          ------+| ?15-29 ? ? ?                           



                          ? ? ? ? ?| ?Stage four                           



                          ? ? ? ? | ? Stage four                           



                          ? ? ? ? ?                              



                          ?+--------------------                           



                          ---+------------------                           



                          ---+------------------                           



                          -------+| ?<15 (or                           



                          dialysis) ? ?| ?Stage                           



                          five ? ? ? ? | ? Stage                           



                          five ? ? ? ? ?                           



                          ?+--------------------                           



                          ---+------------------                           



                          ---+------------------                           



                          -------+ *Each stage                           



                          assumes the associated                           



                          GFR level has been in                           



                          effect for at least                           



                          three months. ?Stages                           



                          1 to 5, with or                           



                          without kidney                           



                          disease, indicate                           



                          chronic kidney                           



                          disease. Notes:                           



                          Determination of                           



                          stages one and two                           



                          (with eGFR                             



                          >59mL/min/1.73 m2)                           



                          requires estimation of                           



                          kidney damage for at                           



                          least three months as                           



                          defined by structural                           



                          or functional                           



                          abnormalities of the                           



                          kidney, manifested by                           



                          either:Pathological                           



                          abnormalities or                           



                          Markers of kidney                           



                          damage (including                           



                          abnormalities in the                           



                          composition of the                           



                          blood or urine or                           



                          abnormalities in                           



                          imaging tests).                           

 

             Lab Interpretation Abnormal                               



             (test code = 84113-1)                                        



[facility] GLUCOSE (AUTOMATED)2022 15:54:35





             Test Item    Value        Reference Range Interpretation Comments

 

             POCT GLU (test code = 9922965684) 200 mg/dL           H      

      

 

             Lab Interpretation (test code = Abnormal                           

    



             67801-5)                                            



[facility] GLUCOSE (AUTOMATED)2022 15:54:35





             Test Item    Value        Reference Range Interpretation Comments

 

             POCT GLU (test code = 8409910458) 200 mg/dL           H      

      

 

             Lab Interpretation (test code = Abnormal                           

    



             29629-4)                                            



[facility] GLUCOSE (AUTOMATED)2022 14:57:17





             Test Item    Value        Reference Range Interpretation Comments

 

             POCT GLU (test code = 4105112992) 211 mg/dL           H      

      

 

             Lab Interpretation (test code = Abnormal                           

    



             56150-7)                                            



[facility] GLUCOSE (AUTOMATED)2022 14:57:17





             Test Item    Value        Reference Range Interpretation Comments

 

             POCT GLU (test code = 1226312926) 211 mg/dL           H      

      

 

             Lab Interpretation (test code = Abnormal                           

    



             37429-1)                                            



[facility] GLUCOSE (AUTOMATED)2022 13:50:06





             Test Item    Value        Reference Range Interpretation Comments

 

             POCT GLU (test code = 0783096992) 216 mg/dL           H      

      

 

             Lab Interpretation (test code = Abnormal                           

    



             26133-1)                                            



[facility] GLUCOSE (AUTOMATED)2022 13:50:06





             Test Item    Value        Reference Range Interpretation Comments

 

             POCT GLU (test code = 2029690717) 216 mg/dL           H      

      

 

             Lab Interpretation (test code = Abnormal                           

    



             80926-3)                                            



[facility] GLUCOSE (AUTOMATED)2022 10:43:13





             Test Item    Value        Reference Range Interpretation Comments

 

             POCT GLU (test code = 9087556814) 199 mg/dL           H      

      

 

             Lab Interpretation (test code = Abnormal                           

    



             12800-2)                                            



[facility] GLUCOSE (AUTOMATED)2022 10:43:13





             Test Item    Value        Reference Range Interpretation Comments

 

             POCT GLU (test code = 0693029240) 199 mg/dL           H      

      

 

             Lab Interpretation (test code = Abnormal                           

    



             41241-3)                                            



[facility] GLUCOSE (AUTOMATED)2022 09:31:59





             Test Item    Value        Reference Range Interpretation Comments

 

             POCT GLU (test code = 2486611663) 172 mg/dL           H      

      

 

             Lab Interpretation (test code = Abnormal                           

    



             38747-5)                                            



[facility] GLUCOSE (AUTOMATED)2022 09:31:59





             Test Item    Value        Reference Range Interpretation Comments

 

             POCT GLU (test code = 1679042015) 172 mg/dL           H      

      

 

             Lab Interpretation (test code = Abnormal                           

    



             41937-1)                                            



[facility] Metabolic Panel (Na, K, Cl, CO2, 
Glucose, BUN, Creatinine, Ca)2022 08:02:13





             Test Item    Value        Reference Range Interpretation Comments

 

             NA (test code = 140 mmol/L   135-145                   



             9163684941)                                         

 

             K (test code = 3.8 mmol/L   3.5-5                     



             3138768156)                                         

 

             CL (test code = 110 mmol/L          H            



             4842256771)                                         

 

             CO2 TOTAL (test code = 13 mmol/L    23-31        L            



             1913138677)                                         

 

             AGAP (test code =              2-16         H            



             9027725618)                                         

 

             BUN (test code = 10 mg/dL     7-23                      



             9473983139)                                         

 

             GLUCOSE (test code = 223 mg/dL           H            



             7479388335)                                         

 

             CREATININE (test code = 0.58 mg/dL   0.6-1.25     L            



             5042492610)                                         

 

             CALCIUM (test code = 8.8 mg/dL    8.6-10.6                  



             7674990022)                                         

 

             eGFR (test code =              mL/min/1.73m2              



             2871198083)                                         

 

             MAL (test code = MAL) Association of                           



                          Glomerular Filtration                           



                          Rate (GFR) and Staging                           



                          of Kidney Disease*                           



                          +---------------------                           



                          --+-------------------                           



                          --+-------------------                           



                          ------+| GFR                           



                          (mL/min/1.73 m2) ?|                           



                          With Kidney Damage ?|                           



                          ?Without Kidney                           



                          Damage+---------------                           



                          --------+-------------                           



                          --------+-------------                           



                          ------------+| ?>90 ?                           



                          ? ? ? ? ? ? ? ?|                           



                          ?Stage one ? ? ? ? ?|                           



                          ? Normal ? ? ? ? ? ? ?                           



                          ?+--------------------                           



                          ---+------------------                           



                          ---+------------------                           



                          -------+| ?60-89 ? ? ?                           



                          ? ? ? ? ?| ?Stage two                           



                          ? ? ? ? ?| ? Decreased                           



                          GFR ? ? ? ?                            



                          +---------------------                           



                          --+-------------------                           



                          --+-------------------                           



                          ------+| ?30-59 ? ? ?                           



                          ? ? ? ? ?| ?Stage                           



                          three ? ? ? ?| ? Stage                           



                          three ? ? ? ? ?                           



                          +---------------------                           



                          --+-------------------                           



                          --+-------------------                           



                          ------+| ?15-29 ? ? ?                           



                          ? ? ? ? ?| ?Stage four                           



                          ? ? ? ? | ? Stage four                           



                          ? ? ? ? ?                              



                          ?+--------------------                           



                          ---+------------------                           



                          ---+------------------                           



                          -------+| ?<15 (or                           



                          dialysis) ? ?| ?Stage                           



                          five ? ? ? ? | ? Stage                           



                          five ? ? ? ? ?                           



                          ?+--------------------                           



                          ---+------------------                           



                          ---+------------------                           



                          -------+ *Each stage                           



                          assumes the associated                           



                          GFR level has been in                           



                          effect for at least                           



                          three months. ?Stages                           



                          1 to 5, with or                           



                          without kidney                           



                          disease, indicate                           



                          chronic kidney                           



                          disease. Notes:                           



                          Determination of                           



                          stages one and two                           



                          (with eGFR                             



                          >59mL/min/1.73 m2)                           



                          requires estimation of                           



                          kidney damage for at                           



                          least three months as                           



                          defined by structural                           



                          or functional                           



                          abnormalities of the                           



                          kidney, manifested by                           



                          either:Pathological                           



                          abnormalities or                           



                          Markers of kidney                           



                          damage (including                           



                          abnormalities in the                           



                          composition of the                           



                          blood or urine or                           



                          abnormalities in                           



                          imaging tests).                           

 

             Lab Interpretation Abnormal                               



             (test code = 98690-2)                                        



[facility] Metabolic Panel (Na, K, Cl, CO2, 
Glucose, BUN, Creatinine, Ca)2022 08:02:13





             Test Item    Value        Reference Range Interpretation Comments

 

             NA (test code = 140 mmol/L   135-145                   



             9376801420)                                         

 

             K (test code = 3.8 mmol/L   3.5-5                     



             4795849141)                                         

 

             CL (test code = 110 mmol/L          H            



             1007330844)                                         

 

             CO2 TOTAL (test code = 13 mmol/L    23-31        L            



             9340871936)                                         

 

             AGAP (test code =              2-16         H            



             2289306575)                                         

 

             BUN (test code = 10 mg/dL     7-23                      



             0165694529)                                         

 

             GLUCOSE (test code = 223 mg/dL           H            



             2695969117)                                         

 

             CREATININE (test code = 0.58 mg/dL   0.6-1.25     L            



             3197277026)                                         

 

             CALCIUM (test code = 8.8 mg/dL    8.6-10.6                  



             1069020495)                                         

 

             eGFR (test code =              mL/min/1.73m2              



             8422968372)                                         

 

             MAL (test code = MAL) Association of                           



                          Glomerular Filtration                           



                          Rate (GFR) and Staging                           



                          of Kidney Disease*                           



                          +---------------------                           



                          --+-------------------                           



                          --+-------------------                           



                          ------+| GFR                           



                          (mL/min/1.73 m2) ?|                           



                          With Kidney Damage ?|                           



                          ?Without Kidney                           



                          Damage+---------------                           



                          --------+-------------                           



                          --------+-------------                           



                          ------------+| ?>90 ?                           



                          ? ? ? ? ? ? ? ?|                           



                          ?Stage one ? ? ? ? ?|                           



                          ? Normal ? ? ? ? ? ? ?                           



                          ?+--------------------                           



                          ---+------------------                           



                          ---+------------------                           



                          -------+| ?60-89 ? ? ?                           



                          ? ? ? ? ?| ?Stage two                           



                          ? ? ? ? ?| ? Decreased                           



                          GFR ? ? ? ?                            



                          +---------------------                           



                          --+-------------------                           



                          --+-------------------                           



                          ------+| ?30-59 ? ? ?                           



                          ? ? ? ? ?| ?Stage                           



                          three ? ? ? ?| ? Stage                           



                          three ? ? ? ? ?                           



                          +---------------------                           



                          --+-------------------                           



                          --+-------------------                           



                          ------+| ?15-29 ? ? ?                           



                          ? ? ? ? ?| ?Stage four                           



                          ? ? ? ? | ? Stage four                           



                          ? ? ? ? ?                              



                          ?+--------------------                           



                          ---+------------------                           



                          ---+------------------                           



                          -------+| ?<15 (or                           



                          dialysis) ? ?| ?Stage                           



                          five ? ? ? ? | ? Stage                           



                          five ? ? ? ? ?                           



                          ?+--------------------                           



                          ---+------------------                           



                          ---+------------------                           



                          -------+ *Each stage                           



                          assumes the associated                           



                          GFR level has been in                           



                          effect for at least                           



                          three months. ?Stages                           



                          1 to 5, with or                           



                          without kidney                           



                          disease, indicate                           



                          chronic kidney                           



                          disease. Notes:                           



                          Determination of                           



                          stages one and two                           



                          (with eGFR                             



                          >59mL/min/1.73 m2)                           



                          requires estimation of                           



                          kidney damage for at                           



                          least three months as                           



                          defined by structural                           



                          or functional                           



                          abnormalities of the                           



                          kidney, manifested by                           



                          either:Pathological                           



                          abnormalities or                           



                          Markers of kidney                           



                          damage (including                           



                          abnormalities in the                           



                          composition of the                           



                          blood or urine or                           



                          abnormalities in                           



                          imaging tests).                           

 

             Lab Interpretation Abnormal                               



             (test code = 66014-1)                                        



[facility] GLUCOSE (AUTOMATED)2022 07:26:01





             Test Item    Value        Reference Range Interpretation Comments

 

             POCT GLU (test code = 3453633920) 245 mg/dL           H      

      

 

             Lab Interpretation (test code = Abnormal                           

    



             45784-6)                                            



[facility] GLUCOSE (AUTOMATED)2022 07:26:01





             Test Item    Value        Reference Range Interpretation Comments

 

             POCT GLU (test code = 6516628523) 245 mg/dL           H      

      

 

             Lab Interpretation (test code = Abnormal                           

    



             65768-9)                                            



[facility] GLUCOSE(AGE &gt;30DAYS)2022 
07:11:00





             Test Item    Value        Reference Range Interpretation Comments

 

             POCT Glu (age>30days) (test code = 245 mg/dL                 

       



             3342)                                               

 

             Lab Interpretation (test code = Normal                             

    



             09597-6)                                            



[facility] GLUCOSE(AGE &gt;30DAYS)2022 
07:11:00





             Test Item    Value        Reference Range Interpretation Comments

 

             POCT Glu (age>30days) (test code = 245 mg/dL                 

       



             3342)                                               

 

             Lab Interpretation (test code = Normal                             

    



             92856-4)                                            



[facility] GLUCOSE(AGE &gt;30DAYS)2022 
07:11:00





             Test Item    Value        Reference Range Interpretation Comments

 

             POCT Glu (age>30days) (test code = 245 mg/dL                 

       



             3342)                                               

 

             Lab Interpretation (test code = Normal                             

    



             62019-6)                                            



[facility] 
[facility]25





             Test Item    Value        Reference Range Interpretation Comments

 

             HGB A1C (test code = 12.7 %       4-5.7        H            



             4548-4)                                             

 

             MAL (test code = MAL) Reference                              



                          RangesNormal:                           



                          <5.7%Prediabetes:                           



                          5.7 - 6.4%Diabetes:                           



                          > 6.5%                                 

 

             Lab Interpretation (test Abnormal                               



             code = 90313-8)                                        



[facility] 
05:57:25





             Test Item    Value        Reference Range Interpretation Comments

 

             HGB A1C (test code = 12.7 %       4-5.7        H            



             4548-4)                                             

 

             MAL (test code = MAL) Reference                              



                          RangesNormal:                           



                          <5.7%Prediabetes:                           



                          5.7 - 6.4%Diabetes:                           



                          > 6.5%                                 

 

             Lab Interpretation (test Abnormal                               



             code = 84498-8)                                        



[facility] 
05:57:25





             Test Item    Value        Reference Range Interpretation Comments

 

             HGB A1C (test code = 12.7 %       4-5.7        H            



             4548-4)                                             

 

             MAL (test code = MAL) Reference                              



                          RangesNormal:                           



                          <5.7%Prediabetes:                           



                          5.7 - 6.4%Diabetes:                           



                          > 6.5%                                 

 

             Lab Interpretation (test Abnormal                               



             code = 56831-6)                                        



[id number]





             Test Item    Value        Reference Range Interpretation Comments

 

             MAGNESIUM (test code = 7530996144) 1.7 mg/dL    1.7-2.4            

       

 

             Lab Interpretation (test code = Normal                             

    



             10183-8)                                            



[id number] 05:46:44





             Test Item    Value        Reference Range Interpretation Comments

 

             MAGNESIUM (test code = 6285602917) 1.7 mg/dL    1.7-2.4            

       

 

             Lab Interpretation (test code = Normal                             

    



             02420-9)                                            



[id number] 05:46:24





             Test Item    Value        Reference Range Interpretation Comments

 

             PHOSPHORUS (test code = 7095087726) 4.0 mg/dL    2.5-5             

        

 

             Lab Interpretation (test code = Normal                             

    



             24902-2)                                            



[facility] [id number] 05:46:24





             Test Item    Value        Reference Range Interpretation Comments

 

             PHOSPHORUS (test code = 4566404703) 4.0 mg/dL    2.5-5             

        

 

             Lab Interpretation (test code = Normal                             

    



             89573-8)                                            



[facility] [id number] 05:46:24





             Test Item    Value        Reference Range Interpretation Comments

 

             PHOSPHORUS (test code = 3815325561) 4.0 mg/dL    2.5-5             

        

 

             Lab Interpretation (test code = Normal                             

    



             93314-4)                                            



[facility] GLUCOSE (AUTOMATED)2022 05:11:10





             Test Item    Value        Reference Range Interpretation Comments

 

             POCT GLU (test code = 4075924590) 410 mg/dL           H      

      

 

             Lab Interpretation (test code = Abnormal                           

    



             42624-6)                                            



[facility] GLUCOSE (AUTOMATED)2022 05:11:10





             Test Item    Value        Reference Range Interpretation Comments

 

             POCT GLU (test code = 6955593069) 410 mg/dL           H      

      

 

             Lab Interpretation (test code = Abnormal                           

    



             87458-9)                                            



[facility] GLUCOSE(AGE &gt;30DAYS)2022 
05:11:00





             Test Item    Value        Reference Range Interpretation Comments

 

             POCT Glu (age>30days) (test code = 410 mg/dL           A     

       



             3342)                                               

 

             Lab Interpretation (test code = Abnormal                           

    



             81561-9)                                            



[facility] GLUCOSE(AGE &gt;30DAYS)2022 
05:11:00





             Test Item    Value        Reference Range Interpretation Comments

 

             POCT Glu (age>30days) (test code = 410 mg/dL           A     

       



             3342)                                               

 

             Lab Interpretation (test code = Abnormal                           

    



             70821-6)                                            



[facility] WITH [id number] 05:03:57





             Test Item    Value        Reference Range Interpretation Comments

 

             WBC (test code =              See_Comment                [Automated



             9490-2)                                             message] The sy

stem



                                                                 which generated



                                                                 this result



                                                                 transmitted



                                                                 reference range

:



                                                                 4.20 - 10.70



                                                                 10*3/?L. The



                                                                 reference range

 was



                                                                 not used to



                                                                 interpret this



                                                                 result as



                                                                 normal/abnormal

.

 

             RBC (test code =              See_Comment                [Automated



             299-8)                                              message] The sy

stem



                                                                 which generated



                                                                 this result



                                                                 transmitted



                                                                 reference range

:



                                                                 4.26 - 5.52



                                                                 10*6/?L. The



                                                                 reference range

 was



                                                                 not used to



                                                                 interpret this



                                                                 result as



                                                                 normal/abnormal

.

 

             HGB (test code = 13.9 g/dL    12.2-16.4                 



             718-7)                                              

 

             HCT (test code = 42.6 %       38.4-49.3                 



             4544-3)                                             

 

             MCV (test code = 88.2 fL      81.7-95.6                 



             787-2)                                              

 

             MCH (test code = 28.8 pg      26.1-32.7                 



             785-6)                                              

 

             MCHC (test code = 32.6 g/dL    31.2-35                   



             786-4)                                              

 

             RDW-SD (test code = 44.1 fL      38.5-51.6                 



             55567-0)                                            

 

             RDW-CV (test code = 13.8 %       12.1-15.4                 



             788-0)                                              

 

             PLT (test code =              See_Comment  H             [Automated



             777-3)                                              message] The sy

stem



                                                                 which generated



                                                                 this result



                                                                 transmitted



                                                                 reference range

:



                                                                 150 - 328 10*3/

?L.



                                                                 The reference r

zhen



                                                                 was not used to



                                                                 interpret this



                                                                 result as



                                                                 normal/abnormal

.

 

             MPV (test code = 12.2 fL      9.8-13                    



             96726-5)                                            

 

             IPF % (test code = 9.4 %        1.2-10.7                  Platelet 

count



             [id number])                                         measured by



                                                                 fluorescence



                                                                 method.

 

             NRBC/100 WBC (test              See_Comment                [Automat

ed



             code = 0590627253)                                        message] 

The system



                                                                 which generated



                                                                 this result



                                                                 transmitted



                                                                 reference range

:



                                                                 0.0 - 10.0 /100



                                                                 WBCs. The refer

ence



                                                                 range was not u

sed



                                                                 to interpret th

is



                                                                 result as



                                                                 normal/abnormal

.

 

             NRBC x10^3 (test code              See_Comment                [Auto

mated



             = 619854)                                        message] The s

ystem



                                                                 which generated



                                                                 this result



                                                                 transmitted



                                                                 reference range

:



                                                                 10*3/?L. The



                                                                 reference range

 was



                                                                 not used to



                                                                 interpret this



                                                                 result as



                                                                 normal/abnormal

.

 

             GRAN MAT (NEUT) % 65.1 %                                 



             (test code = 770-8)                                        

 

             IMM GRAN % (test code 1.10 %                                 



             = 4868781299)                                        

 

             LYMPH % (test code = 22.4 %                                 



             736-9)                                              

 

             MONO % (test code = 9.8 %                                  



             5905-5)                                             

 

             EOS % (test code = 1.4 %                                  



             713-8)                                              

 

             BASO % (test code = 0.2 %                                  



             706-2)                                              

 

             GRAN MAT x10^3(ANC) 6.15 10*3/uL 1.99-6.95                 



             (test code =                                        



             7659268373)                                         

 

             IMM GRAN x10^3 (test 0.10 10*3/uL 0-0.06       H            



             code = 0479852436)                                        

 

             LYMPH x10^3 (test code 2.11 10*3/uL 1.09-3.23                 



             = 731-0)                                            

 

             MONO x10^3 (test code 0.92 10*3/uL 0.36-1.02                 



             = 742-7)                                            

 

             EOS x10^3 (test code = 0.13 10*3/uL 0.06-0.53                 



             711-2)                                              

 

             BASO x10^3 (test code              0.01-0.09                 



             = 704-7)                                            

 

             Lab Interpretation Abnormal                               



             (test code = 26466-6)                                        



[facility] WITH [id number] 05:03:57





             Test Item    Value        Reference Range Interpretation Comments

 

             WBC (test code =              See_Comment                [Automated



             1290-2)                                             message] The sy

stem



                                                                 which generated



                                                                 this result



                                                                 transmitted



                                                                 reference range

:



                                                                 4.20 - 10.70



                                                                 10*3/?L. The



                                                                 reference range

 was



                                                                 not used to



                                                                 interpret this



                                                                 result as



                                                                 normal/abnormal

.

 

             RBC (test code =              See_Comment                [Automated



             569-8)                                              message] The sy

stem



                                                                 which generated



                                                                 this result



                                                                 transmitted



                                                                 reference range

:



                                                                 4.26 - 5.52



                                                                 10*6/?L. The



                                                                 reference range

 was



                                                                 not used to



                                                                 interpret this



                                                                 result as



                                                                 normal/abnormal

.

 

             HGB (test code = 13.9 g/dL    12.2-16.4                 



             718-7)                                              

 

             HCT (test code = 42.6 %       38.4-49.3                 



             4544-3)                                             

 

             MCV (test code = 88.2 fL      81.7-95.6                 



             787-2)                                              

 

             MCH (test code = 28.8 pg      26.1-32.7                 



             785-6)                                              

 

             MCHC (test code = 32.6 g/dL    31.2-35                   



             786-4)                                              

 

             RDW-SD (test code = 44.1 fL      38.5-51.6                 



             47123-1)                                            

 

             RDW-CV (test code = 13.8 %       12.1-15.4                 



             788-0)                                              

 

             PLT (test code =              See_Comment  H             [Automated



             777-3)                                              message] The sy

stem



                                                                 which generated



                                                                 this result



                                                                 transmitted



                                                                 reference range

:



                                                                 150 - 328 10*3/

?L.



                                                                 The reference r

zhen



                                                                 was not used to



                                                                 interpret this



                                                                 result as



                                                                 normal/abnormal

.

 

             MPV (test code = 12.2 fL      9.8-13                    



             49666-5)                                            

 

             IPF % (test code = 9.4 %        1.2-10.7                  Platelet 

count



             [id number])                                         measured by



                                                                 fluorescence



                                                                 method.

 

             NRBC/100 WBC (test              See_Comment                [Automat

ed



             code = 1815637109)                                        message] 

The system



                                                                 which generated



                                                                 this result



                                                                 transmitted



                                                                 reference range

:



                                                                 0.0 - 10.0 /100



                                                                 WBCs. The refer

ence



                                                                 range was not u

sed



                                                                 to interpret th

is



                                                                 result as



                                                                 normal/abnormal

.

 

             NRBC x10^3 (test code              See_Comment                [Auto

mated



             = 3479141036)                                        message] The s

ystem



                                                                 which generated



                                                                 this result



                                                                 transmitted



                                                                 reference range

:



                                                                 10*3/?L. The



                                                                 reference range

 was



                                                                 not used to



                                                                 interpret this



                                                                 result as



                                                                 normal/abnormal

.

 

             GRAN MAT (NEUT) % 65.1 %                                 



             (test code = 770-8)                                        

 

             IMM GRAN % (test code 1.10 %                                 



             = 8930178223)                                        

 

             LYMPH % (test code = 22.4 %                                 



             736-9)                                              

 

             MONO % (test code = 9.8 %                                  



             5905-5)                                             

 

             EOS % (test code = 1.4 %                                  



             713-8)                                              

 

             BASO % (test code = 0.2 %                                  



             706-2)                                              

 

             GRAN MAT x10^3(ANC) 6.15 10*3/uL 1.99-6.95                 



             (test code =                                        



             9076239884)                                         

 

             IMM GRAN x10^3 (test 0.10 10*3/uL 0-0.06       H            



             code = 5686855402)                                        

 

             LYMPH x10^3 (test code 2.11 10*3/uL 1.09-3.23                 



             = 731-0)                                            

 

             MONO x10^3 (test code 0.92 10*3/uL 0.36-1.02                 



             = 742-7)                                            

 

             EOS x10^3 (test code = 0.13 10*3/uL 0.06-0.53                 



             711-2)                                              

 

             BASO x10^3 (test code              0.01-0.09                 



             = 704-7)                                            

 

             Lab Interpretation Abnormal                               



             (test code = 76832-6)                                        



[facility] METABOLIC PANEL (NA, K, CL, CO2, 
GLUCOSE, BUN, CREATININE, CA)2022 04:46:12





             Test Item    Value        Reference Range Interpretation Comments

 

             NA (test code = 136 mmol/L   135-145                   



             9118661846)                                         

 

             K (test code = 5.1 mmol/L   3.5-5        H            



             6812918709)                                         

 

             CL (test code = 106 mmol/L                       



             3517186747)                                         

 

             CO2 TOTAL (test code = 9 mmol/L     23-31        L            



             3966778872)                                         

 

             AGAP (test code =              2-16         H            



             0161568497)                                         

 

             BUN (test code = 11 mg/dL     7-23                      



             9914557469)                                         

 

             GLUCOSE (test code = 405 mg/dL           H            



             4649995771)                                         

 

             CREATININE (test code = 0.62 mg/dL   0.6-1.25                  



             6426149851)                                         

 

             CALCIUM (test code = 9.5 mg/dL    8.6-10.6                  



             3143695428)                                         

 

             eGFR (test code =              mL/min/1.73m2              



             2755295742)                                         

 

             MAL (test code = MAL) Association of                           



                          Glomerular Filtration                           



                          Rate (GFR) and Staging                           



                          of Kidney Disease*                           



                          +---------------------                           



                          --+-------------------                           



                          --+-------------------                           



                          ------+| GFR                           



                          (mL/min/1.73 m2) ?|                           



                          With Kidney Damage ?|                           



                          ?Without Kidney                           



                          Damage+---------------                           



                          --------+-------------                           



                          --------+-------------                           



                          ------------+| ?>90 ?                           



                          ? ? ? ? ? ? ? ?|                           



                          ?Stage one ? ? ? ? ?|                           



                          ? Normal ? ? ? ? ? ? ?                           



                          ?+--------------------                           



                          ---+------------------                           



                          ---+------------------                           



                          -------+| ?60-89 ? ? ?                           



                          ? ? ? ? ?| ?Stage two                           



                          ? ? ? ? ?| ? Decreased                           



                          GFR ? ? ? ?                            



                          +---------------------                           



                          --+-------------------                           



                          --+-------------------                           



                          ------+| ?30-59 ? ? ?                           



                          ? ? ? ? ?| ?Stage                           



                          three ? ? ? ?| ? Stage                           



                          three ? ? ? ? ?                           



                          +---------------------                           



                          --+-------------------                           



                          --+-------------------                           



                          ------+| ?15-29 ? ? ?                           



                          ? ? ? ? ?| ?Stage four                           



                          ? ? ? ? | ? Stage four                           



                          ? ? ? ? ?                              



                          ?+--------------------                           



                          ---+------------------                           



                          ---+------------------                           



                          -------+| ?<15 (or                           



                          dialysis) ? ?| ?Stage                           



                          five ? ? ? ? | ? Stage                           



                          five ? ? ? ? ?                           



                          ?+--------------------                           



                          ---+------------------                           



                          ---+------------------                           



                          -------+ *Each stage                           



                          assumes the associated                           



                          GFR level has been in                           



                          effect for at least                           



                          three months. ?Stages                           



                          1 to 5, with or                           



                          without kidney                           



                          disease, indicate                           



                          chronic kidney                           



                          disease. Notes:                           



                          Determination of                           



                          stages one and two                           



                          (with eGFR                             



                          >59mL/min/1.73 m2)                           



                          requires estimation of                           



                          kidney damage for at                           



                          least three months as                           



                          defined by structural                           



                          or functional                           



                          abnormalities of the                           



                          kidney, manifested by                           



                          either:Pathological                           



                          abnormalities or                           



                          Markers of kidney                           



                          damage (including                           



                          abnormalities in the                           



                          composition of the                           



                          blood or urine or                           



                          abnormalities in                           



                          imaging tests).                           

 

             Lab Interpretation Abnormal                               



             (test code = 02715-7)                                        



[facility] (NA, K, CL, CO2, 
GLUCOSE, BUN, CREATININE, CA)2022 04:46:12





             Test Item    Value        Reference Range Interpretation Comments

 

             NA (test code = 136 mmol/L   135-145                   



             9911552163)                                         

 

             K (test code = 5.1 mmol/L   3.5-5        H            



             2116736517)                                         

 

             CL (test code = 106 mmol/L                       



             4251507661)                                         

 

             CO2 TOTAL (test code = 9 mmol/L     23-31        L            



             8516360529)                                         

 

             AGAP (test code =              2-16         H            



             5451441767)                                         

 

             BUN (test code = 11 mg/dL     7-23                      



             1648481948)                                         

 

             GLUCOSE (test code = 405 mg/dL           H            



             7865941158)                                         

 

             CREATININE (test code = 0.62 mg/dL   0.6-1.25                  



             7744820644)                                         

 

             CALCIUM (test code = 9.5 mg/dL    8.6-10.6                  



             5168381638)                                         

 

             eGFR (test code =              mL/min/1.73m2              



             4939628022)                                         

 

             MAL (test code = MAL) Association of                           



                          Glomerular Filtration                           



                          Rate (GFR) and Staging                           



                          of Kidney Disease*                           



                          +---------------------                           



                          --+-------------------                           



                          --+-------------------                           



                          ------+| GFR                           



                          (mL/min/1.73 m2) ?|                           



                          With Kidney Damage ?|                           



                          ?Without Kidney                           



                          Damage+---------------                           



                          --------+-------------                           



                          --------+-------------                           



                          ------------+| ?>90 ?                           



                          ? ? ? ? ? ? ? ?|                           



                          ?Stage one ? ? ? ? ?|                           



                          ? Normal ? ? ? ? ? ? ?                           



                          ?+--------------------                           



                          ---+------------------                           



                          ---+------------------                           



                          -------+| ?60-89 ? ? ?                           



                          ? ? ? ? ?| ?Stage two                           



                          ? ? ? ? ?| ? Decreased                           



                          GFR ? ? ? ?                            



                          +---------------------                           



                          --+-------------------                           



                          --+-------------------                           



                          ------+| ?30-59 ? ? ?                           



                          ? ? ? ? ?| ?Stage                           



                          three ? ? ? ?| ? Stage                           



                          three ? ? ? ? ?                           



                          +---------------------                           



                          --+-------------------                           



                          --+-------------------                           



                          ------+| ?15-29 ? ? ?                           



                          ? ? ? ? ?| ?Stage four                           



                          ? ? ? ? | ? Stage four                           



                          ? ? ? ? ?                              



                          ?+--------------------                           



                          ---+------------------                           



                          ---+------------------                           



                          -------+| ?<15 (or                           



                          dialysis) ? ?| ?Stage                           



                          five ? ? ? ? | ? Stage                           



                          five ? ? ? ? ?                           



                          ?+--------------------                           



                          ---+------------------                           



                          ---+------------------                           



                          -------+ *Each stage                           



                          assumes the associated                           



                          GFR level has been in                           



                          effect for at least                           



                          three months. ?Stages                           



                          1 to 5, with or                           



                          without kidney                           



                          disease, indicate                           



                          chronic kidney                           



                          disease. Notes:                           



                          Determination of                           



                          stages one and two                           



                          (with eGFR                             



                          >59mL/min/1.73 m2)                           



                          requires estimation of                           



                          kidney damage for at                           



                          least three months as                           



                          defined by structural                           



                          or functional                           



                          abnormalities of the                           



                          kidney, manifested by                           



                          either:Pathological                           



                          abnormalities or                           



                          Markers of kidney                           



                          damage (including                           



                          abnormalities in the                           



                          composition of the                           



                          blood or urine or                           



                          abnormalities in                           



                          imaging tests).                           

 

             Lab Interpretation Abnormal                               



             (test code = 69209-0)                                        



[facility] [id number] 04:43:36





             Test Item    Value        Reference Range Interpretation Comments

 

             PROTIME PATIENT (test              See_Comment                [Auto

mated message]



             code = 5964-2)                                        The system [employer]



                                                                 generated this 

result



                                                                 transmitted ref

erence



                                                                 range: 12.0 - 1

4.7



                                                                 Seconds. The re

ference



                                                                 range was not u

sed to



                                                                 interpret this 

result



                                                                 as normal/abnor

mal.

 

             INR (test code = 6301-6)                                        Nor

mal INR <1.1;



                                                                 Warfarin Therap

eutic



                                                                 range 2.0 to 3.

0 or



                                                                 2.5 to 3.5, dep

ending



                                                                 upon the indica

tions.

 

             Lab Interpretation (test Normal                                 



             code = 84836-5)                                        



[facility] [id number] 04:43:36





             Test Item    Value        Reference Range Interpretation Comments

 

             PROTIME PATIENT (test              See_Comment                [Auto

mated message]



             code = 5964-2)                                        The system [employer]



                                                                 generated this 

result



                                                                 transmitted ref

erence



                                                                 range: 12.0 - 1

4.7



                                                                 Seconds. The re

ference



                                                                 range was not u

sed to



                                                                 interpret this 

result



                                                                 as normal/abnor

mal.

 

             INR (test code = 6301-6)                                        Nor

mal INR <1.1;



                                                                 Warfarin Therap

eutic



                                                                 range 2.0 to 3.

0 or



                                                                 2.5 to 3.5, dep

ending



                                                                 upon the indica

tions.

 

             Lab Interpretation (test Normal                                 



             code = 02265-9)                                        



[facility] [id number] 04:43:36





             Test Item    Value        Reference Range Interpretation Comments

 

             PROTIME PATIENT (test              See_Comment                [Auto

mated message]



             code = 5964-2)                                        The system [employer]



                                                                 generated this 

result



                                                                 transmitted ref

erence



                                                                 range: 12.0 - 1

4.7



                                                                 Seconds. The re

ference



                                                                 range was not u

sed to



                                                                 interpret this 

result



                                                                 as normal/abnor

mal.

 

             INR (test code = 6301-6)                                        Nor

mal INR <1.1;



                                                                 Warfarin Therap

eutic



                                                                 range 2.0 to 3.

0 or



                                                                 2.5 to 3.5, dep

ending



                                                                 upon the indica

tions.

 

             Lab Interpretation (test Normal                                 



             code = 07692-1)                                        



[facility] (ALB,T.PRO,BILI
T,BU/BC,ALT,AST,ALK PHOS)2022 04:40:15





             Test Item    Value        Reference Range Interpretation Comments

 

             TOTAL BILI (test code = 3074504989) 0.9 mg/dL    0.1-1.1           

        

 

             BILI UNCON (test code = 3888910934) 0.3 mg/dL    0.1-1.1           

        

 

             BILI CONJ (test code = 9369671091) 0.0 mg/dL    0-0.3              

       

 

             T PROTEIN (test code = 1872193840) 7.5 g/dL     6.3-8.2            

       

 

             ALBUMIN (test code = 4836486204) 4.0 g/dL     3.5-5                

     

 

             ALK PHOS (test code = 0901501059) 166 U/L             H      

      

 

             ALTv (test code = 1742-6) 28 U/L       5-50                      

 

             AST(SGOT) (test code = 1473350436) 22 U/L       13-40              

       

 

             Lab Interpretation (test code = Abnormal                           

    



             13777-7)                                            



[facility]666[facility] (ALB,T.PRO,BILI
T,BU/BC,ALT,AST,ALK PHOS)2022 04:40:15





             Test Item    Value        Reference Range Interpretation Comments

 

             TOTAL BILI (test code = 6650996687) 0.9 mg/dL    0.1-1.1           

        

 

             BILI UNCON (test code = 9642293808) 0.3 mg/dL    0.1-1.1           

        

 

             BILI CONJ (test code = 9488541718) 0.0 mg/dL    0-0.3              

       

 

             T PROTEIN (test code = 9258537284) 7.5 g/dL     6.3-8.2            

       

 

             ALBUMIN (test code = 4852576225) 4.0 g/dL     3.5-5                

     

 

             ALK PHOS (test code = 6615376775) 166 U/L             H      

      

 

             ALTv (test code = 1742-6) 28 U/L       5-50                      

 

             AST(SGOT) (test code = 2613403439) 22 U/L       13-40              

       

 

             Lab Interpretation (test code = Abnormal                           

    



             45542-3)                                            



[facility] (ALB,T.PRO,BILI
T,BU/BC,ALT,AST,ALK PHOS)2022 04:40:15





             Test Item    Value        Reference Range Interpretation Comments

 

             TOTAL BILI (test code = 8317385502) 0.9 mg/dL    0.1-1.1           

        

 

             BILI UNCON (test code = 0887723397) 0.3 mg/dL    0.1-1.1           

        

 

             BILI CONJ (test code = 7616003596) 0.0 mg/dL    0-0.3              

       

 

             T PROTEIN (test code = 4312834862) 7.5 g/dL     6.3-8.2            

       

 

             ALBUMIN (test code = 0170442999) 4.0 g/dL     3.5-5                

     

 

             ALK PHOS (test code = 8862823316) 166 U/L             H      

      

 

             ALTv (test code = 1742-6) 28 U/L       5-50                      

 

             AST(SGOT) (test code = 0491421636) 22 U/L       13-40              

       

 

             Lab Interpretation (test code = Abnormal                           

    



             34756-0)                                            



[facility]





             Test Item    Value        Reference Range Interpretation Comments

 

             LIPASE (test code = 7902167296) 239 U/L      0-220        H        

    

 

             Lab Interpretation (test code = Abnormal                           

    



             68829-9)                                            



[facility]





             Test Item    Value        Reference Range Interpretation Comments

 

             LIPASE (test code = 7112489693) 239 U/L      0-220        H        

    

 

             Lab Interpretation (test code = Abnormal                           

    



             31832-2)                                            



[facility]





             Test Item    Value        Reference Range Interpretation Comments

 

             LIPASE (test code = 4068099393) 239 U/L      0-220        H        

    

 

             Lab Interpretation (test code = Abnormal                           

    



             74380-1)                                            



[facility]





             Test Item    Value        Reference Range Interpretation Comments

 

             Blood Culture-Aerobic No organisms No growth                 Previo

us



             (test code = 17928-3) isolated                               prelim

inary



                                                                 verified result



                                                                 was Culture In



                                                                 Progress on



                                                                 2022 at 00

02



                                                                 CDTPrevious



                                                                 preliminary



                                                                 verified result



                                                                 was No growth a

t



                                                                 24 hours on



                                                                 2022 at 21

01



                                                                 CDTPrevious



                                                                 preliminary



                                                                 verified result



                                                                 was No growth a

t



                                                                 48 hours on



                                                                 2022 at 21

01



                                                                 CDTPrevious



                                                                 preliminary



                                                                 verified result



                                                                 was No growth a

t



                                                                 72 hours on



                                                                 2022 at 21

01



                                                                 CDT

 

             Blood        No organisms No growth                 Previous



             Culture-Anaerobic isolated                               preliminar

y



             (test code = 54452-4)                                        verifi

ed result



                                                                 was Culture In



                                                                 Progress on



                                                                 2022 at 00

02



                                                                 CDTPrevious



                                                                 preliminary



                                                                 verified result



                                                                 was No growth a

t



                                                                 24 hours on



                                                                 2022 at 21

01



                                                                 CDTPrevious



                                                                 preliminary



                                                                 verified result



                                                                 was No growth a

t



                                                                 48 hours on



                                                                 2022 at 21

01



                                                                 CDTPrevious



                                                                 preliminary



                                                                 verified result



                                                                 was No growth a

t



                                                                 72 hours on



                                                                 2022 at 21

01



                                                                 CDT

 

             Lab Interpretation Normal                                 



             (test code = 08527-4)                                        



[facility] GLUCOSE (AUTOMATED)2022 19:39:16





             Test Item    Value        Reference Range Interpretation Comments

 

             POCT GLU (test code = 4507233006) 153 mg/dL           H      

      

 

             Lab Interpretation (test code = Abnormal                           

    



             99133-4)                                            



[facility] GLUCOSE (AUTOMATED)2022 16:42:59





             Test Item    Value        Reference Range Interpretation Comments

 

             POCT GLU (test code = 1918486127) 311 mg/dL           H      

      

 

             Lab Interpretation (test code = Abnormal                           

    



             75591-0)                                            



[facility] GLUCOSE (AUTOMATED)2022 01:48:17





             Test Item    Value        Reference Range Interpretation Comments

 

             POCT GLU (test code = 1237248873) 253 mg/dL           H      

      

 

             Lab Interpretation (test code = Abnormal                           

    



             93728-8)                                            



[facility] GLUCOSE (AUTOMATED)2022 21:48:07





             Test Item    Value        Reference Range Interpretation Comments

 

             POCT GLU (test code = 2576317790) 279 mg/dL           H      

      

 

             Lab Interpretation (test code = Abnormal                           

    



             01068-0)                                            



[facility] GLUCOSE (AUTOMATED)2022 16:48:09





             Test Item    Value        Reference Range Interpretation Comments

 

             POCT GLU (test code = 4214977950) 275 mg/dL           H      

      

 

             Lab Interpretation (test code = Abnormal                           

    



             67243-4)                                            



[facility] METABOLIC PANEL (NA, K, CL, CO2, 
GLUCOSE, BUN, CREATININE, CA)2022 15:41:54





             Test Item    Value        Reference Range Interpretation Comments

 

             NA (test code = 148 mmol/L   135-145      H            



             7143482559)                                         

 

             K (test code = 4.3 mmol/L   3.5-5                     



             3728456803)                                         

 

             CL (test code = 123 mmol/L          H            



             4269879159)                                         

 

             CO2 TOTAL (test code = 19 mmol/L    23-31        L            



             8465156309)                                         

 

             AGAP (test code =              2-16                      



             5062179643)                                         

 

             BUN (test code = 23 mg/dL     7-23                      



             3419763737)                                         

 

             GLUCOSE (test code = 305 mg/dL           H            



             3163324936)                                         

 

             CREATININE (test code = 0.61 mg/dL   0.6-1.25                  



             7536297936)                                         

 

             CALCIUM (test code = 8.5 mg/dL    8.6-10.6     L            



             9235758661)                                         

 

             eGFR (test code =              mL/min/1.73m2              



             5464160373)                                         

 

             MAL (test code = MAL) Association of                           



                          Glomerular Filtration                           



                          Rate (GFR) and Staging                           



                          of Kidney Disease*                           



                          +---------------------                           



                          --+-------------------                           



                          --+-------------------                           



                          ------+| GFR                           



                          (mL/min/1.73 m2) ?|                           



                          With Kidney Damage ?|                           



                          ?Without Kidney                           



                          Damage+---------------                           



                          --------+-------------                           



                          --------+-------------                           



                          ------------+| ?>90 ?                           



                          ? ? ? ? ? ? ? ?|                           



                          ?Stage one ? ? ? ? ?|                           



                          ? Normal ? ? ? ? ? ? ?                           



                          ?+--------------------                           



                          ---+------------------                           



                          ---+------------------                           



                          -------+| ?60-89 ? ? ?                           



                          ? ? ? ? ?| ?Stage two                           



                          ? ? ? ? ?| ? Decreased                           



                          GFR ? ? ? ?                            



                          +---------------------                           



                          --+-------------------                           



                          --+-------------------                           



                          ------+| ?30-59 ? ? ?                           



                          ? ? ? ? ?| ?Stage                           



                          three ? ? ? ?| ? Stage                           



                          three ? ? ? ? ?                           



                          +---------------------                           



                          --+-------------------                           



                          --+-------------------                           



                          ------+| ?15-29 ? ? ?                           



                          ? ? ? ? ?| ?Stage four                           



                          ? ? ? ? | ? Stage four                           



                          ? ? ? ? ?                              



                          ?+--------------------                           



                          ---+------------------                           



                          ---+------------------                           



                          -------+| ?<15 (or                           



                          dialysis) ? ?| ?Stage                           



                          five ? ? ? ? | ? Stage                           



                          five ? ? ? ? ?                           



                          ?+--------------------                           



                          ---+------------------                           



                          ---+------------------                           



                          -------+ *Each stage                           



                          assumes the associated                           



                          GFR level has been in                           



                          effect for at least                           



                          three months. ?Stages                           



                          1 to 5, with or                           



                          without kidney                           



                          disease, indicate                           



                          chronic kidney                           



                          disease. Notes:                           



                          Determination of                           



                          stages one and two                           



                          (with eGFR                             



                          >59mL/min/1.73 m2)                           



                          requires estimation of                           



                          kidney damage for at                           



                          least three months as                           



                          defined by structural                           



                          or functional                           



                          abnormalities of the                           



                          kidney, manifested by                           



                          either:Pathological                           



                          abnormalities or                           



                          Markers of kidney                           



                          damage (including                           



                          abnormalities in the                           



                          composition of the                           



                          blood or urine or                           



                          abnormalities in                           



                          imaging tests).                           

 

             Lab Interpretation Abnormal                               



             (test code = 59913-0)                                        



[facility]





             Test Item    Value        Reference Range Interpretation Comments

 

             MAGNESIUM (test code = 2727206545) 1.8 mg/dL    1.7-2.4            

       

 

             Lab Interpretation (test code = Normal                             

    



             63634-0)                                            



[facility] 15:17:07





             Test Item    Value        Reference Range Interpretation Comments

 

             PHOSPHORUS (test code = 0133722326) 2.2 mg/dL    2.5-5        L    

        

 

             Lab Interpretation (test code = Abnormal                           

    



             70424-3)                                            



[facility] GLUCOSE (AUTOMATED)2022 12:48:51





             Test Item    Value        Reference Range Interpretation Comments

 

             POCT GLU (test code = 9521191045) 274 mg/dL           H      

      

 

             Lab Interpretation (test code = Abnormal                           

    



             41664-5)                                            



[facility] GLUCOSE (AUTOMATED)2022 01:10:30





             Test Item    Value        Reference Range Interpretation Comments

 

             POCT GLU (test code = 3681171698) 248 mg/dL           H      

      

 

             Lab Interpretation (test code = Abnormal                           

    



             52825-3)                                            



[facility] GLUCOSE (AUTOMATED)2022 01:10:30





             Test Item    Value        Reference Range Interpretation Comments

 

             POCT GLU (test code = 0991964679) 300 mg/dL           H      

      

 

             Lab Interpretation (test code = Abnormal                           

    



             11433-4)                                            



[facility] GLUCOSE (AUTOMATED)2022 16:52:44





             Test Item    Value        Reference Range Interpretation Comments

 

             POCT GLU (test code = 7172420251) 296 mg/dL           H      

      

 

             Lab Interpretation (test code = Abnormal                           

    



             58878-2)                                            



[facility] GLUCOSE (AUTOMATED)2022 16:52:43





             Test Item    Value        Reference Range Interpretation Comments

 

             POCT GLU (test code = 2608003468) 345 mg/dL           H      

      

 

             Lab Interpretation (test code = Abnormal                           

    



             06377-8)                                            



[facility] GLUCOSE (AUTOMATED)2022 16:52:38





             Test Item    Value        Reference Range Interpretation Comments

 

             POCT GLU (test code = 6134457857) 363 mg/dL           H      

      

 

             Lab Interpretation (test code = Abnormal                           

    



             58497-7)                                            



[facility] GLUCOSE (AUTOMATED)2022 16:52:38





             Test Item    Value        Reference Range Interpretation Comments

 

             POCT GLU (test code = 6054606563) 444 mg/dL           H      

      

 

             Lab Interpretation (test code = Abnormal                           

    



             62680-4)                                            



[facility] GLUCOSE (AUTOMATED)2022 16:52:38





             Test Item    Value        Reference Range Interpretation Comments

 

             POCT GLU (test code = 6612050560) 324 mg/dL           H      

      

 

             Lab Interpretation (test code = Abnormal                           

    



             36014-0)                                            



[facility] GLUCOSE (AUTOMATED)2022 16:52:38





             Test Item    Value        Reference Range Interpretation Comments

 

             POCT GLU (test code = 3746974248) 303 mg/dL           H      

      

 

             Lab Interpretation (test code = Abnormal                           

    



             25863-5)                                            



[facility] GLUCOSE (AUTOMATED)2022 16:52:38





             Test Item    Value        Reference Range Interpretation Comments

 

             POCT GLU (test code = 0957577672) 288 mg/dL           H      

      

 

             Lab Interpretation (test code = Abnormal                           

    



             19088-1)                                            



[facility] GLUCOSE (AUTOMATED)2022 16:37:12





             Test Item    Value        Reference Range Interpretation Comments

 

             POCT GLU (test code = 4664857812) 241 mg/dL           H      

      

 

             Lab Interpretation (test code = Abnormal                           

    



             82701-7)                                            



[facility] GLUCOSE (AUTOMATED)2022 13:14:11





             Test Item    Value        Reference Range Interpretation Comments

 

             POCT GLU (test code = 3824972477) 264 mg/dL           H      

      

 

             Lab Interpretation (test code = Abnormal                           

    



             59642-2)                                            



[facility] GLUCOSE (AUTOMATED)2022 11:04:52





             Test Item    Value        Reference Range Interpretation Comments

 

             POCT GLU (test code = 6236804637) 257 mg/dL           H      

      

 

             Lab Interpretation (test code = Abnormal                           

    



             30171-4)                                            



[facility] GLUCOSE (AUTOMATED)2022 07:51:27





             Test Item    Value        Reference Range Interpretation Comments

 

             POCT GLU (test code = 1593295791) 228 mg/dL           H      

      

 

             Lab Interpretation (test code = Abnormal                           

    



             85447-5)                                            



[facility] GLUCOSE (AUTOMATED)2022 03:43:14





             Test Item    Value        Reference Range Interpretation Comments

 

             POCT GLU (test code = 1049579807) 269 mg/dL           H      

      

 

             Lab Interpretation (test code = Abnormal                           

    



             14337-9)                                            



[facility] GLUCOSE (AUTOMATED)2022 00:53:27





             Test Item    Value        Reference Range Interpretation Comments

 

             POCT GLU (test code = 1088185986) 342 mg/dL           H      

      

 

             Lab Interpretation (test code = Abnormal                           

    



             55238-0)                                            



[facility] Metabolic Panel (Na, K, Cl, CO2, 
Glucose, BUN, Creatinine, Ca)2022 00:26:35





             Test Item    Value        Reference Range Interpretation Comments

 

             NA (test code = 161 mmol/L   135-145      HH           



             2701898283)                                         

 

             K (test code = 5.3 mmol/L   3.5-5        H            



             0903556596)                                         

 

             CL (test code = 131 mmol/L          H            



             5837663925)                                         

 

             CO2 TOTAL (test code = 14 mmol/L    23-31        L            



             7465068040)                                         

 

             AGAP (test code =              2-16                      



             3006236117)                                         

 

             BUN (test code = 40 mg/dL     7-23         H            



             8594233457)                                         

 

             GLUCOSE (test code = 363 mg/dL           H            



             2316629029)                                         

 

             CREATININE (test code = 1.31 mg/dL   0.6-1.25     H            



             4964646867)                                         

 

             CALCIUM (test code = 9.0 mg/dL    8.6-10.6                  



             7817574836)                                         

 

             eGFR (test code =              mL/min/1.73m2              



             [id number])                                         

 

             MAL (test code = MAL) Association of                           



                          Glomerular Filtration                           



                          Rate (GFR) and Staging                           



                          of Kidney Disease*                           



                          +---------------------                           



                          --+-------------------                           



                          --+-------------------                           



                          ------+| GFR                           



                          (mL/min/1.73 m2) ?|                           



                          With Kidney Damage ?|                           



                          ?Without Kidney                           



                          Damage+---------------                           



                          --------+-------------                           



                          --------+-------------                           



                          ------------+| ?>90 ?                           



                          ? ? ? ? ? ? ? ?|                           



                          ?Stage one ? ? ? ? ?|                           



                          ? Normal ? ? ? ? ? ? ?                           



                          ?+--------------------                           



                          ---+------------------                           



                          ---+------------------                           



                          -------+| ?60-89 ? ? ?                           



                          ? ? ? ? ?| ?Stage two                           



                          ? ? ? ? ?| ? Decreased                           



                          GFR ? ? ? ?                            



                          +---------------------                           



                          --+-------------------                           



                          --+-------------------                           



                          ------+| ?30-59 ? ? ?                           



                          ? ? ? ? ?| ?Stage                           



                          three ? ? ? ?| ? Stage                           



                          three ? ? ? ? ?                           



                          +---------------------                           



                          --+-------------------                           



                          --+-------------------                           



                          ------+| ?15-29 ? ? ?                           



                          ? ? ? ? ?| ?Stage four                           



                          ? ? ? ? | ? Stage four                           



                          ? ? ? ? ?                              



                          ?+--------------------                           



                          ---+------------------                           



                          ---+------------------                           



                          -------+| ?<15 (or                           



                          dialysis) ? ?| ?Stage                           



                          five ? ? ? ? | ? Stage                           



                          five ? ? ? ? ?                           



                          ?+--------------------                           



                          ---+------------------                           



                          ---+------------------                           



                          -------+ *Each stage                           



                          assumes the associated                           



                          GFR level has been in                           



                          effect for at least                           



                          three months. ?Stages                           



                          1 to 5, with or                           



                          without kidney                           



                          disease, indicate                           



                          chronic kidney                           



                          disease. Notes:                           



                          Determination of                           



                          stages one and two                           



                          (with eGFR                             



                          >59mL/min/1.73 m2)                           



                          requires estimation of                           



                          kidney damage for at                           



                          least three months as                           



                          defined by structural                           



                          or functional                           



                          abnormalities of the                           



                          kidney, manifested by                           



                          either:Pathological                           



                          abnormalities or                           



                          Markers of kidney                           



                          damage (including                           



                          abnormalities in the                           



                          composition of the                           



                          blood or urine or                           



                          abnormalities in                           



                          imaging tests).                           

 

             Lab Interpretation Abnormal                               



             (test code = 83582-0)                                        



[facility] GLUCOSE (AUTOMATED)2022 22:31:31





             Test Item    Value        Reference Range Interpretation Comments

 

             POCT GLU (test code = 0094588321) 349 mg/dL           H      

      

 

             Lab Interpretation (test code = Abnormal                           

    



             73970-4)                                            



[facility] GLUCOSE (AUTOMATED)2022 20:57:05





             Test Item    Value        Reference Range Interpretation Comments

 

             POCT GLU (test code = 9940383156)                     HH     

      

 

             Lab Interpretation (test code = Abnormal                           

    



             38401-9)                                            



[facility] GLUCOSE (AUTOMATED)2022 20:57:00





             Test Item    Value        Reference Range Interpretation Comments

 

             POCT GLU (test code = 5907208561)                     HH     

      

 

             Lab Interpretation (test code = Abnormal                           

    



             68521-7)                                            



[facility] GLUCOSE (AUTOMATED)2022 20:57:00





             Test Item    Value        Reference Range Interpretation Comments

 

             POCT GLU (test code = 0725264396)                     HH     

      

 

             Lab Interpretation (test code = Abnormal                           

    



             87857-2)                                            



[facility] GLUCOSE (AUTOMATED)2022 20:57:00





             Test Item    Value        Reference Range Interpretation Comments

 

             POCT GLU (test code = 3252649877)                     HH     

      

 

             Lab Interpretation (test code = Abnormal                           

    



             36916-0)                                            



[id number] 12[facility]58:42





             Test Item    Value        Reference Range Interpretation Comments

 

             LACTIC ACID (test code = 4.32 mmol/L  0.5-2.2      H            



             0744026945)                                         

 

             Lab Interpretation (test code = Abnormal                           

    



             73664-9)                                            



[facility] [id number] 03:51:48





             Test Item    Value        Reference Range Interpretation Comments

 

             PHOSPHORUS (test code = 6.7 mg/dL    2.5-5        H            Slig

ht hemolysis



             5054102314)                                         

 

             Lab Interpretation (test Abnormal                               



             code = 61144-2)                                        



[facility] [id number] 03:51:48





             Test Item    Value        Reference Range Interpretation Comments

 

             MAGNESIUM (test code = 0637923842) 2.8 mg/dL    1.7-2.4      H     

       

 

             Lab Interpretation (test code = Abnormal                           

    



             27655-9)                                            



[facility] (03045[facility]2022 
02:16:52





             Test Item    Value        Reference Range Interpretation Comments

 

             NA (test code = 166 mmol/L   135-145      HH           



             5704381025)                                         

 

             K (test code = 5.2 mmol/L   3.5-5        H            



             4132994178)                                         

 

             CL (test code = 123 mmol/L          H            



             7532070190)                                         

 

             CO2 TOTAL (test code = 12 mmol/L    23-31        L            



             2545794885)                                         

 

             AGAP (test code =              2-16         H            



             3433714633)                                         

 

             BUN (test code = 35 mg/dL     7-23         H            



             5204065541)                                         

 

             GLUCOSE (test code = 941 mg/dL           HH           



             7835948155)                                         

 

             CREATININE (test code = 1.51 mg/dL   0.6-1.25     H            



             4131493306)                                         

 

             TOTAL BILI (test code = 1.1 mg/dL    0.1-1.1                   



             3071893973)                                         

 

             CALCIUM (test code = 10.7 mg/dL   8.6-10.6     H            



             6191208569)                                         

 

             T PROTEIN (test code = 8.2 g/dL     6.3-8.2                   



             7268534535)                                         

 

             ALBUMIN (test code = 4.5 g/dL     3.5-5                     



             7576267608)                                         

 

             ALK PHOS (test code = 201 U/L             H            



             6766889846)                                         

 

             ALTv (test code = 43 U/L       5-50                      



             2-6)                                             

 

             AST(SGOT) (test code = 27 U/L       13-40                     



             6737885858)                                         

 

             eGFR (test code =              mL/min/1.73m2              



             6714766965)                                         

 

             MAL (test code = MAL) Association of                           



                          Glomerular Filtration                           



                          Rate (GFR) and Staging                           



                          of Kidney Disease*                           



                          +---------------------                           



                          --+-------------------                           



                          --+-------------------                           



                          ------+| GFR                           



                          (mL/min/1.73 m2) ?|                           



                          With Kidney Damage ?|                           



                          ?Without Kidney                           



                          Damage+---------------                           



                          --------+-------------                           



                          --------+-------------                           



                          ------------+| ?>90 ?                           



                          ? ? ? ? ? ? ? ?|                           



                          ?Stage one ? ? ? ? ?|                           



                          ? Normal ? ? ? ? ? ? ?                           



                          ?+--------------------                           



                          ---+------------------                           



                          ---+------------------                           



                          -------+| ?60-89 ? ? ?                           



                          ? ? ? ? ?| ?Stage two                           



                          ? ? ? ? ?| ? Decreased                           



                          GFR ? ? ? ?                            



                          +---------------------                           



                          --+-------------------                           



                          --+-------------------                           



                          ------+| ?30-59 ? ? ?                           



                          ? ? ? ? ?| ?Stage                           



                          three ? ? ? ?| ? Stage                           



                          three ? ? ? ? ?                           



                          +---------------------                           



                          --+-------------------                           



                          --+-------------------                           



                          ------+| ?15-29 ? ? ?                           



                          ? ? ? ? ?| ?Stage four                           



                          ? ? ? ? | ? Stage four                           



                          ? ? ? ? ?                              



                          ?+--------------------                           



                          ---+------------------                           



                          ---+------------------                           



                          -------+| ?<15 (or                           



                          dialysis) ? ?| ?Stage                           



                          five ? ? ? ? | ? Stage                           



                          five ? ? ? ? ?                           



                          ?+--------------------                           



                          ---+------------------                           



                          ---+------------------                           



                          -------+ *Each stage                           



                          assumes the associated                           



                          GFR level has been in                           



                          effect for at least                           



                          three months. ?Stages                           



                          1 to 5, with or                           



                          without kidney                           



                          disease, indicate                           



                          chronic kidney                           



                          disease. Notes:                           



                          Determination of                           



                          stages one and two                           



                          (with eGFR                             



                          >59mL/min/1.73 m2)                           



                          requires estimation of                           



                          kidney damage for at                           



                          least three months as                           



                          defined by structural                           



                          or functional                           



                          abnormalities of the                           



                          kidney, manifested by                           



                          either:Pathological                           



                          abnormalities or                           



                          Markers of kidney                           



                          damage (including                           



                          abnormalities in the                           



                          composition of the                           



                          blood or urine or                           



                          abnormalities in                           



                          imaging tests).                           

 

             Lab Interpretation Abnormal                               



             (test code = 92525-9)                                        



[facility] 02[facility]12[facility]40





             Test Item    Value        Reference    Interpretation Comments



                                       Range                     

 

             TROPONIN I (test 0.005 ng/mL  See_Comment                [Automated



             code = 5192066606)                                        message] 

The



                                                                 system which



                                                                 generated this



                                                                 result



                                                                 transmitted



                                                                 reference range

:



                                                                 <=0.034. The



                                                                 reference range



                                                                 was not used to



                                                                 interpret this



                                                                 result as



                                                                 normal/abnormal

.

 

             MAL (test code = Reference (Normal)                           



             MAL)         Range (defined by                           



                          the 99th percentile                           



                          reference limit): <=                           



                          0.034 ng/mL Note:                           



                          Cardiac troponin                           



                          begins to rise 3-4                           



                          hours after the                           



                          onset of ischemia.                           



                          Repeat in 4-6 hours                           



                          if the sample was                           



                          drawn within 3-4                           



                          hours of the onset                           



                          of the symptom and                           



                          found normal.                           



                          Diagnosis of                           



                          myocardial injury is                           



                          made with acute                           



                          changes in cTn                           



                          concentrations with                           



                          at least one serial                           



                          sample above the                           



                          99th percentile                           



                          upper reference                           



                          limit (URL), taken                           



                          together with the                           



                          patient's clinical                           



                          presentation. Biotin                           



                          has been reported to                           



                          cause a negative                           



                          bias, interpret                           



                          results relative to                           



                          patient's use of                           



                          biotin.                                

 

             Lab Interpretation Normal                                 



             (test code =                                        



             65362-6)                                            



[facility]
SALICYLATE&lt;10mg/ 9:06 PM [facility] 
LABORATORYTherapeutic Range: ? ?? ? ? Analgesic and Antipyretic Use ? ? ? ? 20-
100 mg/L ? ? Anti-Inflammatory Use ? ? ? ? ? ? ? ? 100-250 mg/L Toxic Range: ? ?
? ? ? ? ? ? ? ? ? ? ? ? ? Greater than 300 mg/LUnChildress [facility] CenterSALICYLATE2022-07-27 02:06:11SALICYLATE&lt;10mg/ 9:06 PM 
[facility] LABORATORYTherapeutic Range: ? ?? ? ? Analgesic and
Antipyretic Use ? ? ? ?  mg/L ? ? Anti-Inflammatory Use ? ? ? ? ? ? ? ? 
100-250 mg/L Toxic Range: ? ? ? ? ? ? ? ? ? ? ? ? ? ? ? Greater than 300 mg/L
[facility]2022[facility] 02:05:51
ALCOHOL&lt;10mg/dL2022 9:05 PM [facility] 
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
ReportedUnChildress [facility] CenterETHANOL2022-07-27 02:05:51
ALCOHOL&lt;10mg/dL2022 9:05 PM [facility] 
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
ReportedUnChildress [facility] CenterACETAMINOPHEN2022-07-27 02:03:04





             Test Item    Value        Reference Range Interpretation Comments

 

             ACETAMINOP (test code =              10-30        L            



             1170546141)                                         

 

             MAL (test code = MAL) Toxic: Greater than                          

 



                          200 ug/mL @ 4 hour                           



                          post ingestion or                           



                          greater than 50                           



                          ug/mL @ 12 hour post                           



                          ingestion                              

 

             Lab Interpretation (test Abnormal                               



             code = 47751-7)                                        



[facility]ET[facility] 02:03:04





             Test Item    Value        Reference Range Interpretation Comments

 

             ACETAMINOP (test code =              10-30        L            



             6367928019)                                         

 

             MAL (test code = MAL) Toxic: Greater than                          

 



                          200 ug/mL @ 4 hour                           



                          post ingestion or                           



                          greater than 50                           



                          ug/mL @ 12 hour post                           



                          ingestion                              

 

             Lab Interpretation (test Abnormal                               



             code = 75053-7)                                        



[facility]





             Test Item    Value        Reference Range Interpretation Comments

 

             LIPASE (test code = 4627781203) 246 U/L      0-220        H        

    

 

             Lab Interpretation (test code = Abnormal                           

    



             40187-3)                                            



[id number] 01:12:41





             Test Item    Value        Reference Range Interpretation Comments

 

             WBC (test code =              See_Comment  H             [Automated



             9390-2)                                             message] The



                                                                 system which



                                                                 generated this



                                                                 result transmit

huma



                                                                 reference range

:



                                                                 4.20 - 10.70



                                                                 10*3/?L. The



                                                                 reference range



                                                                 was not used to



                                                                 interpret this



                                                                 result as



                                                                 normal/abnormal

.

 

             RBC (test code =              See_Comment  H             [Automated



             209-8)                                              message] The



                                                                 system which



                                                                 generated this



                                                                 result transmit

huma



                                                                 reference range

:



                                                                 4.26 - 5.52



                                                                 10*6/?L. The



                                                                 reference range



                                                                 was not used to



                                                                 interpret this



                                                                 result as



                                                                 normal/abnormal

.

 

             HGB (test code = 18.3 g/dL    12.2-16.4    H            



             718-7)                                              

 

             HCT (test code = 57.6 %       38.4-49.3    H            



             4544-3)                                             

 

             MCV (test code = 90.7 fL      81.7-95.6                 



             787-2)                                              

 

             MCH (test code = 28.8 pg      26.1-32.7                 



             785-6)                                              

 

             MCHC (test code = 31.8 g/dL    31.2-35                   



             786-4)                                              

 

             RDW-SD (test code = 50.8 fL      38.5-51.6                 



             89223-3)                                            

 

             RDW-CV (test code = 16.4 %       12.1-15.4    H            



             788-0)                                              

 

             PLT (test code =              See_Comment  H             [Automated



             777-3)                                              message] The



                                                                 system which



                                                                 generated this



                                                                 result transmit

huma



                                                                 reference range

:



                                                                 150 - 328 10*3/

?L.



                                                                 The reference



                                                                 range was not u

sed



                                                                 to interpret th

is



                                                                 result as



                                                                 normal/abnormal

.

 

             MPV (test code = 11.6 fL      9.8-13                    



             26545-1)                                            

 

             NRBC/100 WBC (test              See_Comment                [Automat

ed



             code = 7457937161)                                        message] 

The



                                                                 system which



                                                                 generated this



                                                                 result transmit

huma



                                                                 reference range

:



                                                                 0.0 - 10.0 /100



                                                                 WBCs. The



                                                                 reference range



                                                                 was not used to



                                                                 interpret this



                                                                 result as



                                                                 normal/abnormal

.

 

             NRBC x10^3 (test code              See_Comment                [Auto

mated



             = 1300140017)                                        message] The



                                                                 system which



                                                                 generated this



                                                                 result transmit

huma



                                                                 reference range

:



                                                                 10*3/?L. The



                                                                 reference range



                                                                 was not used to



                                                                 interpret this



                                                                 result as



                                                                 normal/abnormal

.

 

             SEG % (test code = 77 %         33-76        H            



             69770-2)                                            

 

             BAND % (test code = 9 %          0-1          H            



             05538-7)                                            

 

             LYMPH % (test code = 7 %          14-54        L            



             13257-2)                                            

 

             MONO % (test code = 7 %          0-4          H            



             26485-3)                                            

 

             ANC (test code = 15.98 10*3/uL 1.99-6.95    H            



             753-4)                                              

 

             OLENA CELLS (test code 2+           See_Comment  A             [Auto

mated



             = 7330-9)                                           message] The



                                                                 system which



                                                                 generated this



                                                                 result transmit

huma



                                                                 reference range

:



                                                                 (none). The



                                                                 reference range



                                                                 was not used to



                                                                 interpret this



                                                                 result as



                                                                 normal/abnormal

.

 

             Lab Interpretation Abnormal                               



             (test code = 63607-5)                                        



[facility] GLUCOSE (AUTOMATED)2022 01:30:22





             Test Item    Value        Reference Range Interpretation Comments

 

             POCT GLU (test code = 6433282935) 392 mg/dL           H      

      

 

             Lab Interpretation (test code = Abnormal                           

    



             96403-7)                                            



[facility] GLUCOSE(AGE &gt;30DAYS)2022 
01:30:00





             Test Item    Value        Reference Range Interpretation Comments

 

             POCT Glu (age>30days) 392 mg/dL,                      



             (test code = 3342) Singer informed                           

 

             Lab Interpretation (test Normal                                 



             code = 80104-8)                                        



[facility] GLUCOSE (AUTOMATED)2022 00:43:22





             Test Item    Value        Reference Range Interpretation Comments

 

             POCT GLU (test code = 8989248099) 430 mg/dL           H      

      

 

             Lab Interpretation (test code = Abnormal                           

    



             71757-9)                                            



[facility] GLUCOSE (AUTOMATED)2022-07-15 23:29:00





             Test Item    Value        Reference Range Interpretation Comments

 

             POCT GLU (test code = 4942992066) 493 mg/dL           HH     

      

 

             Lab Interpretation (test code = Abnormal                           

    



             17141-9)                                            



[facility] METABOLIC PANEL (NA, K, CL, CO2, 
GLUCOSE, BUN, CREATININE, CA)2022-07-15 22:56:10





             Test Item    Value        Reference Range Interpretation Comments

 

             NA (test code = 132 mmol/L   135-145      L            



             1209287608)                                         

 

             K (test code = 4.4 mmol/L   3.5-5                     



             5096619570)                                         

 

             CL (test code = 98 mmol/L                        



             6649296616)                                         

 

             CO2 TOTAL (test code = 19 mmol/L    23-31        L            



             0897105948)                                         

 

             AGAP (test code =              2-16                      



             6226242152)                                         

 

             BUN (test code = 11 mg/dL     7-23                      



             4214444383)                                         

 

             GLUCOSE (test code = 519 mg/dL           HH           



             8765094811)                                         

 

             CREATININE (test code = 0.55 mg/dL   0.6-1.25     L            



             3021625203)                                         

 

             CALCIUM (test code = 9.5 mg/dL    8.6-10.6                  



             4764722464)                                         

 

             eGFR (test code =              mL/min/1.73m2              



             4262595541)                                         

 

             MAL (test code = MAL) Association of                           



                          Glomerular Filtration                           



                          Rate (GFR) and Staging                           



                          of Kidney Disease*                           



                          +---------------------                           



                          --+-------------------                           



                          --+-------------------                           



                          ------+| GFR                           



                          (mL/min/1.73 m2) ?|                           



                          With Kidney Damage ?|                           



                          ?Without Kidney                           



                          Damage+---------------                           



                          --------+-------------                           



                          --------+-------------                           



                          ------------+| ?>90 ?                           



                          ? ? ? ? ? ? ? ?|                           



                          ?Stage one ? ? ? ? ?|                           



                          ? Normal ? ? ? ? ? ? ?                           



                          ?+--------------------                           



                          ---+------------------                           



                          ---+------------------                           



                          -------+| ?60-89 ? ? ?                           



                          ? ? ? ? ?| ?Stage two                           



                          ? ? ? ? ?| ? Decreased                           



                          GFR ? ? ? ?                            



                          +---------------------                           



                          --+-------------------                           



                          --+-------------------                           



                          ------+| ?30-59 ? ? ?                           



                          ? ? ? ? ?| ?Stage                           



                          three ? ? ? ?| ? Stage                           



                          three ? ? ? ? ?                           



                          +---------------------                           



                          --+-------------------                           



                          --+-------------------                           



                          ------+| ?15-29 ? ? ?                           



                          ? ? ? ? ?| ?Stage four                           



                          ? ? ? ? | ? Stage four                           



                          ? ? ? ? ?                              



                          ?+--------------------                           



                          ---+------------------                           



                          ---+------------------                           



                          -------+| ?<15 (or                           



                          dialysis) ? ?| ?Stage                           



                          five ? ? ? ? | ? Stage                           



                          five ? ? ? ? ?                           



                          ?+--------------------                           



                          ---+------------------                           



                          ---+------------------                           



                          -------+ *Each stage                           



                          assumes the associated                           



                          GFR level has been in                           



                          effect for at least                           



                          three months. ?Stages                           



                          1 to 5, with or                           



                          without kidney                           



                          disease, indicate                           



                          chronic kidney                           



                          disease. Notes:                           



                          Determination of                           



                          stages one and two                           



                          (with eGFR                             



                          >59mL/min/1.73 m2)                           



                          requires estimation of                           



                          kidney damage for at                           



                          least three months as                           



                          defined by structural                           



                          or functional                           



                          abnormalities of the                           



                          kidney, manifested by                           



                          either:Pathological                           



                          abnormalities or                           



                          Markers of kidney                           



                          damage (including                           



                          abnormalities in the                           



                          composition of the                           



                          blood or urine or                           



                          abnormalities in                           



                          imaging tests).                           

 

             Lab Interpretation Abnormal                               



             (test code = 98069-8)                                        



[facility] WITH DIFF2022-07-15 22:45:19





             Test Item    Value        Reference Range Interpretation Comments

 

             WBC (test code =              See_Comment                [Automated



             7229-2)                                             message] The sy

stem



                                                                 which generated



                                                                 this result



                                                                 transmitted



                                                                 reference range

:



                                                                 4.20 - 10.70



                                                                 10*3/?L. The



                                                                 reference range

 was



                                                                 not used to



                                                                 interpret this



                                                                 result as



                                                                 normal/abnormal

.

 

             RBC (test code =              See_Comment                [Automated



             717-4)                                              message] The sy

stem



                                                                 which generated



                                                                 this result



                                                                 transmitted



                                                                 reference range

:



                                                                 4.26 - 5.52



                                                                 10*6/?L. The



                                                                 reference range

 was



                                                                 not used to



                                                                 interpret this



                                                                 result as



                                                                 normal/abnormal

.

 

             HGB (test code = 15.7 g/dL    12.2-16.4                 



             718-7)                                              

 

             HCT (test code = 46.0 %       38.4-49.3                 



             4544-3)                                             

 

             MCV (test code = 85.0 fL      81.7-95.6                 



             787-2)                                              

 

             MCH (test code = 29.0 pg      26.1-32.7                 



             785-6)                                              

 

             MCHC (test code = 34.1 g/dL    31.2-35                   



             786-4)                                              

 

             RDW-SD (test code = 41.7 fL      38.5-51.6                 



             25385-1)                                            

 

             RDW-CV (test code = 13.6 %       12.1-15.4                 



             788-0)                                              

 

             PLT (test code =              See_Comment  H             [Automated



             777-3)                                              message] The sy

stem



                                                                 which generated



                                                                 this result



                                                                 transmitted



                                                                 reference range

:



                                                                 150 - 328 10*3/

?L.



                                                                 The reference r

zhen



                                                                 was not used to



                                                                 interpret this



                                                                 result as



                                                                 normal/abnormal

.

 

             MPV (test code = 10.6 fL      9.8-13                    



             96734-2)                                            

 

             NRBC/100 WBC (test              See_Comment                [Automat

ed



             code = 2786925573)                                        message] 

The system



                                                                 which generated



                                                                 this result



                                                                 transmitted



                                                                 reference range

:



                                                                 0.0 - 10.0 /100



                                                                 WBCs. The refer

ence



                                                                 range was not u

sed



                                                                 to interpret th

is



                                                                 result as



                                                                 normal/abnormal

.

 

             NRBC x10^3 (test code              See_Comment                [Auto

mated



             = [id number])                                        message] The s

ystem



                                                                 which generated



                                                                 this result



                                                                 transmitted



                                                                 reference range

:



                                                                 10*3/?L. The



                                                                 reference range

 was



                                                                 not used to



                                                                 interpret this



                                                                 result as



                                                                 normal/abnormal

.

 

             GRAN MAT (NEUT) % 66.6 %                                 



             (test code = 770-8)                                        

 

             IMM GRAN % (test code 0.40 %                                 



             = [id number])                                        

 

             LYMPH % (test code = 20.5 %                                 



             736-9)                                              

 

             MONO % (test code = 10.9 %                                 



             5905-5)                                             

 

             EOS % (test code = 1.5 %                                  



             713-8)                                              

 

             BASO % (test code = 0.1 %                                  



             706-2)                                              

 

             GRAN MAT x10^3(ANC) 4.88 10*3/uL 1.99-6.95                 



             (test code =                                        



             4121362063)                                         

 

             IMM GRAN x10^3 (test 0.03 10*3/uL 0-0.06                    



             code = [id number])                                        

 

             LYMPH x10^3 (test code 1.50 10*3/uL 1.09-3.23                 



             = 731-0)                                            

 

             MONO x10^3 (test code 0.80 10*3/uL 0.36-1.02                 



             = 742-7)                                            

 

             EOS x10^3 (test code = 0.11 10*3/uL 0.06-0.53                 



             711-2)                                              

 

             BASO x10^3 (test code              0.01-0.09                 



             = 704-7)                                            

 

             Lab Interpretation Abnormal                               



             (test code = 86875-9)                                        



[facility] WITH [id number] 11:05:43





             Test Item    Value        Reference Range Interpretation Comments

 

             WBC (test code =              See_Comment  H             [Automated



             6690-2)                                             message] The sy

stem



                                                                 which generated



                                                                 this result



                                                                 transmitted



                                                                 reference range

:



                                                                 4.20 - 10.70



                                                                 10*3/?L. The



                                                                 reference range

 was



                                                                 not used to



                                                                 interpret this



                                                                 result as



                                                                 normal/abnormal

.

 

             RBC (test code =              See_Comment                [Automated



             789-8)                                              message] The sy

stem



                                                                 which generated



                                                                 this result



                                                                 transmitted



                                                                 reference range

:



                                                                 4.26 - 5.52



                                                                 10*6/?L. The



                                                                 reference range

 was



                                                                 not used to



                                                                 interpret this



                                                                 result as



                                                                 normal/abnormal

.

 

             HGB (test code = 15.6 g/dL    12.2-16.4                 



             718-7)                                              

 

             HCT (test code = 46.3 %       38.4-49.3                 



             4544-3)                                             

 

             MCV (test code = 85.4 fL      81.7-95.6                 



             787-2)                                              

 

             MCH (test code = 28.8 pg      26.1-32.7                 



             785-6)                                              

 

             MCHC (test code = 33.7 g/dL    31.2-35.0                 



             786-4)                                              

 

             RDW-SD (test code = 41.7 fL      38.5-51.6                 



             54366-5)                                            

 

             RDW-CV (test code = 13.4 %       12.1-15.4                 



             788-0)                                              

 

             PLT (test code =              See_Comment  H             [Automated



             777-3)                                              message] The sy

stem



                                                                 which generated



                                                                 this result



                                                                 transmitted



                                                                 reference range

:



                                                                 150 - 328 10*3/

?L.



                                                                 The reference r

zhen



                                                                 was not used to



                                                                 interpret this



                                                                 result as



                                                                 normal/abnormal

.

 

             MPV (test code = 10.3 fL      9.8-13.0                  



             86628-8)                                            

 

             NRBC/100 WBC (test              See_Comment                [Automat

ed



             code = [id number])                                        message] 

The system



                                                                 which generated



                                                                 this result



                                                                 transmitted



                                                                 reference range

:



                                                                 0.0 - 10.0 /100



                                                                 WBCs. The refer

ence



                                                                 range was not u

sed



                                                                 to interpret th

is



                                                                 result as



                                                                 normal/abnormal

.

 

             NRBC x10^3 (test code <0.01        See_Comment                [Auto

mated



             = [id number])                                        message] The s

ystem



                                                                 which generated



                                                                 this result



                                                                 transmitted



                                                                 reference range

:



                                                                 10*3/?L. The



                                                                 reference range

 was



                                                                 not used to



                                                                 interpret this



                                                                 result as



                                                                 normal/abnormal

.

 

             GRAN MAT (NEUT) % 71.2 %                                 



             (test code = 770-8)                                        

 

             IMM GRAN % (test code 1.30 %                                 



             = 3277292467)                                        

 

             LYMPH % (test code = 18.2 %                                 



             736-9)                                              

 

             MONO % (test code = 7.1 %                                  



             5905-5)                                             

 

             EOS % (test code = 1.9 %                                  



             713-8)                                              

 

             BASO % (test code = 0.3 %                                  



             706-2)                                              

 

             GRAN MAT x10^3(ANC) 8.37 10*3/uL 1.99-6.95    H            



             (test code =                                        



             0401340397)                                         

 

             IMM GRAN x10^3 (test 0.15 10*3/uL 0.00-0.06    H            



             code = 7769721796)                                        

 

             LYMPH x10^3 (test code 2.14 10*3/uL 1.09-3.23                 



             = 731-0)                                            

 

             MONO x10^3 (test code 0.84 10*3/uL 0.36-1.02                 



             = 742-7)                                            

 

             EOS x10^3 (test code = 0.22 10*3/uL 0.06-0.53                 



             711-2)                                              

 

             BASO x10^3 (test code 0.04 10*3/uL 0.01-0.09                 



             = 704-7)                                            

 

             Lab Interpretation Abnormal                               



             (test code = 31433-7)                                        



[facility]. METABOLIC PANEL ([id number])2022 
11:03:21





             Test Item    Value        Reference Range Interpretation Comments

 

             NA (test code = 133 mmol/L   135-145      L            



             3605969411)                                         

 

             K (test code = 4.3 mmol/L   3.5-5.0                   



             8507066758)                                         

 

             CL (test code = 99 mmol/L                        



             3201213078)                                         

 

             CO2 TOTAL (test code = 20 mmol/L    23-31        L            



             9736207446)                                         

 

             AGAP (test code =              2-16                      



             8702431260)                                         

 

             BUN (test code = 11 mg/dL     7-23                      



             9277530284)                                         

 

             GLUCOSE (test code = 357 mg/dL           H            



             5880664253)                                         

 

             CREATININE (test code = 0.52 mg/dL   0.60-1.25    L            



             9170922218)                                         

 

             TOTAL BILI (test code = 0.7 mg/dL    0.1-1.1                   



             5326136788)                                         

 

             CALCIUM (test code = 9.6 mg/dL    8.6-10.6                  



             8004170450)                                         

 

             T PROTEIN (test code = 7.5 g/dL     6.3-8.2                   



             6812086479)                                         

 

             ALBUMIN (test code = 4.2 g/dL     3.5-5.0                   



             5251230772)                                         

 

             ALK PHOS (test code = 185 U/L             H            



             1771062989)                                         

 

             ALTv (test code = 78 U/L       5-50         H            



             1742-6)                                             

 

             AST(SGOT) (test code = 18 U/L       13-40                     



             1830894332)                                         

 

             eGFR (test code =              mL/min/1.73m2              



             3594923476)                                         

 

             MAL (test code = MAL) Association of                           



                          Glomerular Filtration                           



                          Rate (GFR) and Staging                           



                          of Kidney Disease*                           



                          +---------------------                           



                          --+-------------------                           



                          --+-------------------                           



                          ------+| GFR                           



                          (mL/min/1.73 m2) ?|                           



                          With Kidney Damage ?|                           



                          ?Without Kidney                           



                          Damage+---------------                           



                          --------+-------------                           



                          --------+-------------                           



                          ------------+| ?>90 ?                           



                          ? ? ? ? ? ? ? ?|                           



                          ?Stage one ? ? ? ? ?|                           



                          ? Normal ? ? ? ? ? ? ?                           



                          ?+--------------------                           



                          ---+------------------                           



                          ---+------------------                           



                          -------+| ?60-89 ? ? ?                           



                          ? ? ? ? ?| ?Stage two                           



                          ? ? ? ? ?| ? Decreased                           



                          GFR ? ? ? ?                            



                          +---------------------                           



                          --+-------------------                           



                          --+-------------------                           



                          ------+| ?30-59 ? ? ?                           



                          ? ? ? ? ?| ?Stage                           



                          three ? ? ? ?| ? Stage                           



                          three ? ? ? ? ?                           



                          +---------------------                           



                          --+-------------------                           



                          --+-------------------                           



                          ------+| ?15-29 ? ? ?                           



                          ? ? ? ? ?| ?Stage four                           



                          ? ? ? ? | ? Stage four                           



                          ? ? ? ? ?                              



                          ?+--------------------                           



                          ---+------------------                           



                          ---+------------------                           



                          -------+| ?<15 (or                           



                          dialysis) ? ?| ?Stage                           



                          five ? ? ? ? | ? Stage                           



                          five ? ? ? ? ?                           



                          ?+--------------------                           



                          ---+------------------                           



                          ---+------------------                           



                          -------+ *Each stage                           



                          assumes the associated                           



                          GFR level has been in                           



                          effect for at least                           



                          three months. ?Stages                           



                          1 to 5, with or                           



                          without kidney                           



                          disease, indicate                           



                          chronic kidney                           



                          disease. Notes:                           



                          Determination of                           



                          stages one and two                           



                          (with eGFR                             



                          >59mL/min/1.73 m2)                           



                          requires estimation of                           



                          kidney damage for at                           



                          least three months as                           



                          defined by structural                           



                          or functional                           



                          abnormalities of the                           



                          kidney, manifested by                           



                          either:Pathological                           



                          abnormalities or                           



                          Markers of kidney                           



                          damage (including                           



                          abnormalities in the                           



                          composition of the                           



                          blood or urine or                           



                          abnormalities in                           



                          imaging tests).                           

 

             Lab Interpretation Abnormal                               



             (test code = 50971-5)                                        



[facility]





             Test Item    Value        Reference Range Interpretation Comments

 

             LIPASE (test code = 8980905169) 63 U/L       0-220                 

    

 

             Lab Interpretation (test code = Normal                             

    



             60698-4)                                            



[facility] GLUCOSE (AUTOMATED)2022 22:54:04





             Test Item    Value        Reference Range Interpretation Comments

 

             POCT GLU (test code = 0041623026) 361 mg/dL           H      

      

 

             Lab Interpretation (test code = Abnormal                           

    



             29135-8)                                            



[facility] GLUCOSE (AUTOMATED)2022 12:19:07





             Test Item    Value        Reference Range Interpretation Comments

 

             POCT GLU (test code = 1749171365) 390 mg/dL           H      

      

 

             Lab Interpretation (test code = Abnormal                           

    



             45287-4)                                            



[facility] GLUCOSE (AUTOMATED)2022 04:48:35





             Test Item    Value        Reference Range Interpretation Comments

 

             POCT GLU (test code = 7900619642) 412 mg/dL           H      

      

 

             Lab Interpretation (test code = Abnormal                           

    



             80800-1)                                            



[facility] GLUCOSE (AUTOMATED)2022 01:44:33





             Test Item    Value        Reference Range Interpretation Comments

 

             POCT GLU (test code = 0375999089) 376 mg/dL           H      

      

 

             Lab Interpretation (test code = Abnormal                           

    



             61886-5)                                            



[facility] GLUCOSE (AUTOMATED)2022 21:51:37





             Test Item    Value        Reference Range Interpretation Comments

 

             POCT GLU (test code = 5744152334) 267 mg/dL           H      

      

 

             Lab Interpretation (test code = Abnormal                           

    



             68599-8)                                            



[facility] GLUCOSE (AUTOMATED)2022 17:05:21





             Test Item    Value        Reference Range Interpretation Comments

 

             POCT GLU (test code = 7755250633) 377 mg/dL           H      

      

 

             Lab Interpretation (test code = Abnormal                           

    



             06229-1)                                            



[facility] GLUCOSE (AUTOMATED)2022 13:10:54





             Test Item    Value        Reference Range Interpretation Comments

 

             POCT GLU (test code = 9389114593) 495 mg/dL           HH     

      

 

             Lab Interpretation (test code = Abnormal                           

    



             76584-2)                                            



[facility] GLUCOSE (AUTOMATED)2022 08:34:04





             Test Item    Value        Reference Range Interpretation Comments

 

             POCT GLU (test code = 9509739529) 427 mg/dL           H      

      

 

             Lab Interpretation (test code = Abnormal                           

    



             41750-1)                                            



[facility] GLUCOSE (AUTOMATED)2022 05:46:02





             Test Item    Value        Reference Range Interpretation Comments

 

             POCT GLU (test code = 6142980918) 451 mg/dL           HH     

      

 

             Lab Interpretation (test code = Abnormal                           

    



             46200-4)                                            



[facility] GLUCOSE (AUTOMATED)2022 02:44:38





             Test Item    Value        Reference Range Interpretation Comments

 

             POCT GLU (test code = 9678513594) 429 mg/dL           H      

      

 

             Lab Interpretation (test code = Abnormal                           

    



             05760-9)                                            



[facility] GLUCOSE (AUTOMATED)2022 22:38:24





             Test Item    Value        Reference Range Interpretation Comments

 

             POCT GLU (test code = 7857031628) 404 mg/dL           H      

      

 

             Lab Interpretation (test code = Abnormal                           

    



             71175-3)                                            



[facility] GLUCOSE (AUTOMATED)2022 18:05:20





             Test Item    Value        Reference Range Interpretation Comments

 

             POCT GLU (test code = 9698662684) 423 mg/dL           H      

      

 

             Lab Interpretation (test code = Abnormal                           

    



             86665-3)                                            



[facility] METABOLIC PANEL (NA, K, CL, CO2, 
GLUCOSE, BUN, CREATININE, CA)2022 14:56:25





             Test Item    Value        Reference Range Interpretation Comments

 

             NA (test code = 135 mmol/L   135-145                   



             1103878586)                                         

 

             K (test code = 4.0 mmol/L   3.5-5.0                   



             8719648538)                                         

 

             CL (test code = 102 mmol/L                       



             8708081778)                                         

 

             CO2 TOTAL (test code = 22 mmol/L    23-31        L            



             0703030264)                                         

 

             AGAP (test code =              2-16                      



             5518941296)                                         

 

             BUN (test code = 13 mg/dL     7-23                      



             9056690103)                                         

 

             GLUCOSE (test code = 547 mg/dL                      



             8241410452)                                         

 

             CREATININE (test code = 0.65 mg/dL   0.60-1.25                 



             0263232238)                                         

 

             CALCIUM (test code = 8.6 mg/dL    8.6-10.6                  



             1678458460)                                         

 

             eGFR (test code =              mL/min/1.73m2              



             5051773240)                                         

 

             MAL (test code = MAL) Association of                           



                          Glomerular Filtration                           



                          Rate (GFR) and Staging                           



                          of Kidney Disease*                           



                          +---------------------                           



                          --+-------------------                           



                          --+-------------------                           



                          ------+| GFR                           



                          (mL/min/1.73 m2) ?|                           



                          With Kidney Damage ?|                           



                          ?Without Kidney                           



                          Damage+---------------                           



                          --------+-------------                           



                          --------+-------------                           



                          ------------+| ?>90 ?                           



                          ? ? ? ? ? ? ? ?|                           



                          ?Stage one ? ? ? ? ?|                           



                          ? Normal ? ? ? ? ? ? ?                           



                          ?+--------------------                           



                          ---+------------------                           



                          ---+------------------                           



                          -------+| ?60-89 ? ? ?                           



                          ? ? ? ? ?| ?Stage two                           



                          ? ? ? ? ?| ? Decreased                           



                          GFR ? ? ? ?                            



                          +---------------------                           



                          --+-------------------                           



                          --+-------------------                           



                          ------+| ?30-59 ? ? ?                           



                          ? ? ? ? ?| ?Stage                           



                          three ? ? ? ?| ? Stage                           



                          three ? ? ? ? ?                           



                          +---------------------                           



                          --+-------------------                           



                          --+-------------------                           



                          ------+| ?15-29 ? ? ?                           



                          ? ? ? ? ?| ?Stage four                           



                          ? ? ? ? | ? Stage four                           



                          ? ? ? ? ?                              



                          ?+--------------------                           



                          ---+------------------                           



                          ---+------------------                           



                          -------+| ?<15 (or                           



                          dialysis) ? ?| ?Stage                           



                          five ? ? ? ? | ? Stage                           



                          five ? ? ? ? ?                           



                          ?+--------------------                           



                          ---+------------------                           



                          ---+------------------                           



                          -------+ *Each stage                           



                          assumes the associated                           



                          GFR level has been in                           



                          effect for at least                           



                          three months. ?Stages                           



                          1 to 5, with or                           



                          without kidney                           



                          disease, indicate                           



                          chronic kidney                           



                          disease. Notes:                           



                          Determination of                           



                          stages one and two                           



                          (with eGFR                             



                          >59mL/min/1.73 m2)                           



                          requires estimation of                           



                          kidney damage for at                           



                          least three months as                           



                          defined by structural                           



                          or functional                           



                          abnormalities of the                           



                          kidney, manifested by                           



                          either:Pathological                           



                          abnormalities or                           



                          Markers of kidney                           



                          damage (including                           



                          abnormalities in the                           



                          composition of the                           



                          blood or urine or                           



                          abnormalities in                           



                          imaging tests).                           

 

             Lab Interpretation Abnormal                               



             (test code = 50787-5)                                        



[facility] GLUCOSE (AUTOMATED)2022 13:36:27





             Test Item    Value        Reference Range Interpretation Comments

 

             POCT GLU (test code = 9335753422) 526 mg/dL           HH     

      

 

             Lab Interpretation (test code = Abnormal                           

    



             92578-5)                                            



[facility] GLUCOSE (AUTOMATED)2022 12:52:44





             Test Item    Value        Reference Range Interpretation Comments

 

             POCT GLU (test code = 2948256111) >600                HH     

      

 

             Lab Interpretation (test code = Abnormal                           

    



             89967-2)                                            



[facility] GLUCOSE (AUTOMATED)2022 12:52:44





             Test Item    Value        Reference Range Interpretation Comments

 

             POCT GLU (test code = 5330949681) >600                HH     

      

 

             Lab Interpretation (test code = Abnormal                           

    



             54614-1)                                            



[facility] GLUCOSE (AUTOMATED)2022 11:02:02





             Test Item    Value        Reference Range Interpretation Comments

 

             POCT GLU (test code = 3095193019) 521 mg/dL           HH     

      

 

             Lab Interpretation (test code = Abnormal                           

    



             32698-3)                                            



[facility] GLUCOSE (AUTOMATED)2022 07:22:18





             Test Item    Value        Reference Range Interpretation Comments

 

             POCT GLU (test code = 9084420974) 534 mg/dL           HH     

      

 

             Lab Interpretation (test code = Abnormal                           

    



             66655-1)                                            



[facility] METABOLIC PANEL (NA, K, CL, CO2, 
GLUCOSE, BUN, CREATININE, CA)2022 06:34:51





             Test Item    Value        Reference Range Interpretation Comments

 

             NA (test code = 133 mmol/L   135-145      L            



             6204045700)                                         

 

             K (test code = 4.9 mmol/L   3.5-5.0                   Slight



             5938076971)                                         hemolysis

 

             CL (test code = 96 mmol/L           L            



             6276839710)                                         

 

             CO2 TOTAL (test code 21 mmol/L    23-31        L            



             = 1789854156)                                        

 

             AGAP (test code =              2-16                      



             4335302834)                                         

 

             BUN (test code = 12 mg/dL     7-23                      Slight



             7930909437)                                         hemolysis

 

             GLUCOSE (test code = 639 mg/dL           HH           



             0906421453)                                         

 

             CREATININE (test code 0.46 mg/dL   0.60-1.25    L            



             = 7636243128)                                        

 

             CALCIUM (test code = 9.7 mg/dL    8.6-10.6                  



             2610355007)                                         

 

             eGFR (test code =              mL/min/1.73m2              



             8406289995)                                         

 

             MAL (test code = MAL) Association of                           



                          Glomerular                             



                          Filtration Rate                           



                          (GFR) and Staging                           



                          of Kidney Disease*                           



                          +------------------                           



                          -----+-------------                           



                          --------+----------                           



                          ---------------+|                           



                          GFR (mL/min/1.73                           



                          m2) ?| With Kidney                           



                          Damage ?| ?Without                           



                          Kidney                                 



                          Damage+------------                           



                          -----------+-------                           



                          --------------+----                           



                          -------------------                           



                          --+| ?>90 ? ? ? ? ?                           



                          ? ? ? ?| ?Stage one                           



                          ? ? ? ? ?| ? Normal                           



                          ? ? ? ? ? ? ?                           



                          ?+-----------------                           



                          ------+------------                           



                          ---------+---------                           



                          ----------------+|                           



                          ?60-89 ? ? ? ? ? ?                           



                          ? ?| ?Stage two ? ?                           



                          ? ? ?| ? Decreased                           



                          GFR ? ? ? ?                            



                          +------------------                           



                          -----+-------------                           



                          --------+----------                           



                          ---------------+|                           



                          ?30-59 ? ? ? ? ? ?                           



                          ? ?| ?Stage three ?                           



                          ? ? ?| ? Stage                           



                          three ? ? ? ? ?                           



                          +------------------                           



                          -----+-------------                           



                          --------+----------                           



                          ---------------+|                           



                          ?15-29 ? ? ? ? ? ?                           



                          ? ?| ?Stage four ?                           



                          ? ? ? | ? Stage                           



                          four ? ? ? ? ?                           



                          ?+-----------------                           



                          ------+------------                           



                          ---------+---------                           



                          ----------------+|                           



                          ?<15 (or dialysis)                           



                          ? ?| ?Stage five ?                           



                          ? ? ? | ? Stage                           



                          five ? ? ? ? ?                           



                          ?+-----------------                           



                          ------+------------                           



                          ---------+---------                           



                          ----------------+                           



                          *Each stage assumes                           



                          the associated GFR                           



                          level has been in                           



                          effect for at least                           



                          three months.                           



                          ?Stages 1 to 5,                           



                          with or without                           



                          kidney disease,                           



                          indicate chronic                           



                          kidney disease.                           



                          Notes:                                 



                          Determination of                           



                          stages one and two                           



                          (with eGFR                             



                          >59mL/min/1.73 m2)                           



                          requires estimation                           



                          of kidney damage                           



                          for at least three                           



                          months as defined                           



                          by structural or                           



                          functional                             



                          abnormalities of                           



                          the kidney,                            



                          manifested by                           



                          either:Pathological                           



                          abnormalities or                           



                          Markers of kidney                           



                          damage (including                           



                          abnormalities in                           



                          the composition of                           



                          the blood or urine                           



                          or abnormalities in                           



                          imaging tests).                           

 

             Lab Interpretation Abnormal                               



             (test code = 07880-1)                                        



[facility]. METABOLIC PANEL (40820)2022 
02:59:57





             Test Item    Value        Reference Range Interpretation Comments

 

             NA (test code = 126 mmol/L   135-145      L            



             0049060025)                                         

 

             K (test code = 5.2 mmol/L   3.5-5.0      H            



             7520871557)                                         

 

             CL (test code = 89 mmol/L           L            



             9854424989)                                         

 

             CO2 TOTAL (test code = 20 mmol/L    23-31        L            



             1408358940)                                         

 

             AGAP (test code =              2-16         H            



             8967183187)                                         

 

             BUN (test code = 12 mg/dL     7-23                      



             6994469534)                                         

 

             GLUCOSE (test code = 856 mg/dL           HH           



             0857531263)                                         

 

             CREATININE (test code = 0.49 mg/dL   0.60-1.25    L            



             9252752118)                                         

 

             TOTAL BILI (test code = 0.7 mg/dL    0.1-1.1                   



             1956858731)                                         

 

             CALCIUM (test code = 10.2 mg/dL   8.6-10.6                  



             0292002867)                                         

 

             T PROTEIN (test code = 8.2 g/dL     6.3-8.2                   



             3050319669)                                         

 

             ALBUMIN (test code = 4.5 g/dL     3.5-5.0                   



             1002466499)                                         

 

             ALK PHOS (test code = 186 U/L             H            



             2904580877)                                         

 

             ALTv (test code = 27 U/L       5-50                      



             1742-6)                                             

 

             AST(SGOT) (test code = 18 U/L       13-40                     



             9577085754)                                         

 

             eGFR (test code =              mL/min/1.73m2              



             7891496229)                                         

 

             MAL (test code = MAL) Association of                           



                          Glomerular Filtration                           



                          Rate (GFR) and Staging                           



                          of Kidney Disease*                           



                          +---------------------                           



                          --+-------------------                           



                          --+-------------------                           



                          ------+| GFR                           



                          (mL/min/1.73 m2) ?|                           



                          With Kidney Damage ?|                           



                          ?Without Kidney                           



                          Damage+---------------                           



                          --------+-------------                           



                          --------+-------------                           



                          ------------+| ?>90 ?                           



                          ? ? ? ? ? ? ? ?|                           



                          ?Stage one ? ? ? ? ?|                           



                          ? Normal ? ? ? ? ? ? ?                           



                          ?+--------------------                           



                          ---+------------------                           



                          ---+------------------                           



                          -------+| ?60-89 ? ? ?                           



                          ? ? ? ? ?| ?Stage two                           



                          ? ? ? ? ?| ? Decreased                           



                          GFR ? ? ? ?                            



                          +---------------------                           



                          --+-------------------                           



                          --+-------------------                           



                          ------+| ?30-59 ? ? ?                           



                          ? ? ? ? ?| ?Stage                           



                          three ? ? ? ?| ? Stage                           



                          three ? ? ? ? ?                           



                          +---------------------                           



                          --+-------------------                           



                          --+-------------------                           



                          ------+| ?15-29 ? ? ?                           



                          ? ? ? ? ?| ?Stage four                           



                          ? ? ? ? | ? Stage four                           



                          ? ? ? ? ?                              



                          ?+--------------------                           



                          ---+------------------                           



                          ---+------------------                           



                          -------+| ?<15 (or                           



                          dialysis) ? ?| ?Stage                           



                          five ? ? ? ? | ? Stage                           



                          five ? ? ? ? ?                           



                          ?+--------------------                           



                          ---+------------------                           



                          ---+------------------                           



                          -------+ *Each stage                           



                          assumes the associated                           



                          GFR level has been in                           



                          effect for at least                           



                          three months. ?Stages                           



                          1 to 5, with or                           



                          without kidney                           



                          disease, indicate                           



                          chronic kidney                           



                          disease. Notes:                           



                          Determination of                           



                          stages one and two                           



                          (with eGFR                             



                          >59mL/min/1.73 m2)                           



                          requires estimation of                           



                          kidney damage for at                           



                          least three months as                           



                          defined by structural                           



                          or functional                           



                          abnormalities of the                           



                          kidney, manifested by                           



                          either:Pathological                           



                          abnormalities or                           



                          Markers of kidney                           



                          damage (including                           



                          abnormalities in the                           



                          composition of the                           



                          blood or urine or                           



                          abnormalities in                           



                          imaging tests).                           

 

             Lab Interpretation Abnormal                               



             (test code = 47582-6)                                        



[facility]





             Test Item    Value        Reference Range Interpretation Comments

 

             LIPASE (test code = 0361877827) 146 U/L      0-220                 

    

 

             Lab Interpretation (test code = Normal                             

    



             31111-4)                                            



[facility] WITH [id number] 02:34:07





             Test Item    Value        Reference Range Interpretation Comments

 

             WBC (test code =              See_Comment                [Automated



             6690-2)                                             message] The sy

stem



                                                                 which generated



                                                                 this result



                                                                 transmitted



                                                                 reference range

:



                                                                 4.20 - 10.70



                                                                 10*3/?L. The



                                                                 reference range

 was



                                                                 not used to



                                                                 interpret this



                                                                 result as



                                                                 normal/abnormal

.

 

             RBC (test code =              See_Comment                [Automated



             789-8)                                              message] The sy

stem



                                                                 which generated



                                                                 this result



                                                                 transmitted



                                                                 reference range

:



                                                                 4.26 - 5.52



                                                                 10*6/?L. The



                                                                 reference range

 was



                                                                 not used to



                                                                 interpret this



                                                                 result as



                                                                 normal/abnormal

.

 

             HGB (test code = 16.1 g/dL    12.2-16.4                 



             718-7)                                              

 

             HCT (test code = 47.5 %       38.4-49.3                 



             4544-3)                                             

 

             MCV (test code = 86.2 fL      81.7-95.6                 



             787-2)                                              

 

             MCH (test code = 29.2 pg      26.1-32.7                 



             785-6)                                              

 

             MCHC (test code = 33.9 g/dL    31.2-35.0                 



             786-4)                                              

 

             RDW-SD (test code = 42.0 fL      38.5-51.6                 



             23290-1)                                            

 

             RDW-CV (test code = 13.5 %       12.1-15.4                 



             788-0)                                              

 

             PLT (test code =              See_Comment  H             [Automated



             777-3)                                              message] The sy

stem



                                                                 which generated



                                                                 this result



                                                                 transmitted



                                                                 reference range

:



                                                                 150 - 328 10*3/

?L.



                                                                 The reference r

zhen



                                                                 was not used to



                                                                 interpret this



                                                                 result as



                                                                 normal/abnormal

.

 

             MPV (test code = 10.5 fL      9.8-13.0                  



             91600-0)                                            

 

             NRBC/100 WBC (test              See_Comment                [Automat

ed



             code = 7203713218)                                        message] 

The system



                                                                 which generated



                                                                 this result



                                                                 transmitted



                                                                 reference range

:



                                                                 0.0 - 10.0 /100



                                                                 WBCs. The refer

ence



                                                                 range was not u

sed



                                                                 to interpret th

is



                                                                 result as



                                                                 normal/abnormal

.

 

             NRBC x10^3 (test code <0.01        See_Comment                [Auto

mated



             = 2640520919)                                        message] The s

ystem



                                                                 which generated



                                                                 this result



                                                                 transmitted



                                                                 reference range

:



                                                                 10*3/?L. The



                                                                 reference range

 was



                                                                 not used to



                                                                 interpret this



                                                                 result as



                                                                 normal/abnormal

.

 

             GRAN MAT (NEUT) % 67.5 %                                 



             (test code = 770-8)                                        

 

             IMM GRAN % (test code 0.70 %                                 



             = 9677796491)                                        

 

             LYMPH % (test code = 21.7 %                                 



             736-9)                                              

 

             MONO % (test code = 8.4 %                                  



             5905-5)                                             

 

             EOS % (test code = 1.3 %                                  



             713-8)                                              

 

             BASO % (test code = 0.4 %                                  



             706-2)                                              

 

             GRAN MAT x10^3(ANC) 6.61 10*3/uL 1.99-6.95                 



             (test code =                                        



             1758744877)                                         

 

             IMM GRAN x10^3 (test 0.07 10*3/uL 0.00-0.06    H            



             code = 0013527761)                                        

 

             LYMPH x10^3 (test code 2.12 10*3/uL 1.09-3.23                 



             = 731-0)                                            

 

             MONO x10^3 (test code 0.82 10*3/uL 0.36-1.02                 



             = 742-7)                                            

 

             EOS x10^3 (test code = 0.13 10*3/uL 0.06-0.53                 



             711-2)                                              

 

             BASO x10^3 (test code 0.04 10*3/uL 0.01-0.09                 



             = 704-7)                                            

 

             Lab Interpretation Abnormal                               



             (test code = 51258-8)                                        



[facility] (ALB,T.PRO,BILI
T,BU/BC,ALT,AST,ALK PHOS)2022 13:57:09





             Test Item    Value        Reference Range Interpretation Comments

 

             TOTAL BILI (test code = 6571940090) 0.7 mg/dL    0.1-1.1           

        

 

             BILI UNCON (test code = 4007585176) 0.3 mg/dL    0.1-1.1           

        

 

             BILI CONJ (test code = 1396622986) 0.0 mg/dL    0.0-0.3            

       

 

             T PROTEIN (test code = 2933223354) 7.5 g/dL     6.3-8.2            

       

 

             ALBUMIN (test code = 3369797385) 3.8 g/dL     3.5-5.0              

     

 

             ALK PHOS (test code = 5721766753) 161 U/L             H      

      

 

             ALTv (test code = 1742-6) 44 U/L       5-50                      

 

             AST(SGOT) (test code = 4207124909) 47 U/L       13-40        H     

       

 

             Lab Interpretation (test code = Abnormal                           

    



             11465-3)                                            



[facility] METABOLIC PANEL (NA, K, CL, CO2, 
GLUCOSE, BUN, CREATININE, CA)2022 10:48:59





             Test Item    Value        Reference Range Interpretation Comments

 

             NA (test code = 136 mmol/L   135-145                   



             6261851138)                                         

 

             K (test code = 3.8 mmol/L   3.5-5.0                   



             6475494930)                                         

 

             CL (test code = 101 mmol/L                       



             2309969986)                                         

 

             CO2 TOTAL (test code = 22 mmol/L    23-31        L            



             0905318329)                                         

 

             AGAP (test code =              2-16                      



             1618827970)                                         

 

             BUN (test code = 16 mg/dL     7-23                      



             1598892141)                                         

 

             GLUCOSE (test code = 358 mg/dL           H            



             0433772631)                                         

 

             CREATININE (test code = 0.63 mg/dL   0.60-1.25                 



             6068823437)                                         

 

             CALCIUM (test code = 9.2 mg/dL    8.6-10.6                  



             6151754382)                                         

 

             eGFR (test code =              mL/min/1.73m2              



             7702525476)                                         

 

             MAL (test code = MAL) Association of                           



                          Glomerular Filtration                           



                          Rate (GFR) and Staging                           



                          of Kidney Disease*                           



                          +---------------------                           



                          --+-------------------                           



                          --+-------------------                           



                          ------+| GFR                           



                          (mL/min/1.73 m2) ?|                           



                          With Kidney Damage ?|                           



                          ?Without Kidney                           



                          Damage+---------------                           



                          --------+-------------                           



                          --------+-------------                           



                          ------------+| ?>90 ?                           



                          ? ? ? ? ? ? ? ?|                           



                          ?Stage one ? ? ? ? ?|                           



                          ? Normal ? ? ? ? ? ? ?                           



                          ?+--------------------                           



                          ---+------------------                           



                          ---+------------------                           



                          -------+| ?60-89 ? ? ?                           



                          ? ? ? ? ?| ?Stage two                           



                          ? ? ? ? ?| ? Decreased                           



                          GFR ? ? ? ?                            



                          +---------------------                           



                          --+-------------------                           



                          --+-------------------                           



                          ------+| ?30-59 ? ? ?                           



                          ? ? ? ? ?| ?Stage                           



                          three ? ? ? ?| ? Stage                           



                          three ? ? ? ? ?                           



                          +---------------------                           



                          --+-------------------                           



                          --+-------------------                           



                          ------+| ?15-29 ? ? ?                           



                          ? ? ? ? ?| ?Stage four                           



                          ? ? ? ? | ? Stage four                           



                          ? ? ? ? ?                              



                          ?+--------------------                           



                          ---+------------------                           



                          ---+------------------                           



                          -------+| ?<15 (or                           



                          dialysis) ? ?| ?Stage                           



                          five ? ? ? ? | ? Stage                           



                          five ? ? ? ? ?                           



                          ?+--------------------                           



                          ---+------------------                           



                          ---+------------------                           



                          -------+ *Each stage                           



                          assumes the associated                           



                          GFR level has been in                           



                          effect for at least                           



                          three months. ?Stages                           



                          1 to 5, with or                           



                          without kidney                           



                          disease, indicate                           



                          chronic kidney                           



                          disease. Notes:                           



                          Determination of                           



                          stages one and two                           



                          (with eGFR                             



                          >59mL/min/1.73 m2)                           



                          requires estimation of                           



                          kidney damage for at                           



                          least three months as                           



                          defined by structural                           



                          or functional                           



                          abnormalities of the                           



                          kidney, manifested by                           



                          either:Pathological                           



                          abnormalities or                           



                          Markers of kidney                           



                          damage (including                           



                          abnormalities in the                           



                          composition of the                           



                          blood or urine or                           



                          abnormalities in                           



                          imaging tests).                           

 

             Lab Interpretation Abnormal                               



             (test code = 08327-6)                                        



[facility] WITH [id number] 10:01:35





             Test Item    Value        Reference Range Interpretation Comments

 

             WBC (test code =              See_Comment                [Automated



             6690-2)                                             message] The sy

stem



                                                                 which generated



                                                                 this result



                                                                 transmitted



                                                                 reference range

:



                                                                 4.20 - 10.70



                                                                 10*3/?L. The



                                                                 reference range

 was



                                                                 not used to



                                                                 interpret this



                                                                 result as



                                                                 normal/abnormal

.

 

             RBC (test code =              See_Comment                [Automated



             789-8)                                              message] The sy

stem



                                                                 which generated



                                                                 this result



                                                                 transmitted



                                                                 reference range

:



                                                                 4.26 - 5.52



                                                                 10*6/?L. The



                                                                 reference range

 was



                                                                 not used to



                                                                 interpret this



                                                                 result as



                                                                 normal/abnormal

.

 

             HGB (test code = 14.9 g/dL    12.2-16.4                 



             718-7)                                              

 

             HCT (test code = 43.2 %       38.4-49.3                 



             4544-3)                                             

 

             MCV (test code = 86.1 fL      81.7-95.6                 



             787-2)                                              

 

             MCH (test code = 29.7 pg      26.1-32.7                 



             785-6)                                              

 

             MCHC (test code = 34.5 g/dL    31.2-35.0                 



             786-4)                                              

 

             RDW-SD (test code = 41.6 fL      38.5-51.6                 



             86724-8)                                            

 

             RDW-CV (test code = 13.4 %       12.1-15.4                 



             788-0)                                              

 

             PLT (test code =              See_Comment  H             [Automated



             777-3)                                              message] The sy

stem



                                                                 which generated



                                                                 this result



                                                                 transmitted



                                                                 reference range

:



                                                                 150 - 328 10*3/

?L.



                                                                 The reference r

zhen



                                                                 was not used to



                                                                 interpret this



                                                                 result as



                                                                 normal/abnormal

.

 

             MPV (test code = 11.0 fL      9.8-13.0                  



             79920-8)                                            

 

             NRBC/100 WBC (test              See_Comment                [Automat

ed



             code = 8587483090)                                        message] 

The system



                                                                 which generated



                                                                 this result



                                                                 transmitted



                                                                 reference range

:



                                                                 0.0 - 10.0 /100



                                                                 WBCs. The refer

ence



                                                                 range was not u

sed



                                                                 to interpret th

is



                                                                 result as



                                                                 normal/abnormal

.

 

             NRBC x10^3 (test code <0.01        See_Comment                [Auto

mated



             = 419856)                                        message] The s

ystem



                                                                 which generated



                                                                 this result



                                                                 transmitted



                                                                 reference range

:



                                                                 10*3/?L. The



                                                                 reference range

 was



                                                                 not used to



                                                                 interpret this



                                                                 result as



                                                                 normal/abnormal

.

 

             GRAN MAT (NEUT) % 62.6 %                                 



             (test code = 770-8)                                        

 

             IMM GRAN % (test code 0.40 %                                 



             = [id number])                                        

 

             LYMPH % (test code = 23.2 %                                 



             736-9)                                              

 

             MONO % (test code = 9.6 %                                  



             5905-5)                                             

 

             EOS % (test code = 3.8 %                                  



             713-8)                                              

 

             BASO % (test code = 0.4 %                                  



             706-2)                                              

 

             GRAN MAT x10^3(ANC) 4.76 10*3/uL 1.99-6.95                 



             (test code =                                        



             9977525232)                                         

 

             IMM GRAN x10^3 (test 0.03 10*3/uL 0.00-0.06                 



             code = 7164513572)                                        

 

             LYMPH x10^3 (test code 1.76 10*3/uL 1.09-3.23                 



             = 731-0)                                            

 

             MONO x10^3 (test code 0.73 10*3/uL 0.36-1.02                 



             = 742-7)                                            

 

             EOS x10^3 (test code = 0.29 10*3/uL 0.06-0.53                 



             711-2)                                              

 

             BASO x10^3 (test code 0.03 10*3/uL 0.01-0.09                 



             = 704-7)                                            

 

             Lab Interpretation Abnormal                               



             (test code = 63768-1)                                        



[id number] 
[facility]59





             Test Item    Value        Reference Range Interpretation Comments

 

             NA (test code = 138 mmol/L   135-145                   



             0640493234)                                         

 

             K (test code = 5.4 mmol/L   3.5-5.0      H            



             9002986970)                                         

 

             CL (test code = 98 mmol/L                        



             7176503389)                                         

 

             CO2 TOTAL (test code = 17 mmol/L    23-31        L            



             3559646262)                                         

 

             AGAP (test code =              2-16         H            



             4550046888)                                         

 

             BUN (test code = 19 mg/dL     7-23                      



             6345944894)                                         

 

             GLUCOSE (test code = 469 mg/dL           HH           



             3422214570)                                         

 

             CREATININE (test code = 0.74 mg/dL   0.60-1.25                 



             0519385569)                                         

 

             TOTAL BILI (test code = 1.0 mg/dL    0.1-1.1                   



             2057859832)                                         

 

             CALCIUM (test code = 10.1 mg/dL   8.6-10.6                  



             7770002092)                                         

 

             T PROTEIN (test code = 8.2 g/dL     6.3-8.2                   



             8897403674)                                         

 

             ALBUMIN (test code = 4.6 g/dL     3.5-5.0                   



             7112871941)                                         

 

             ALK PHOS (test code = 208 U/L             H            



             4463472628)                                         

 

             ALTv (test code = 51 U/L       5-50         H            



             1742-6)                                             

 

             AST(SGOT) (test code = 18 U/L       13-40                     



             0971133832)                                         

 

             eGFR (test code =              mL/min/1.73m2              



             9128862707)                                         

 

             MAL (test code = MAL) Association of                           



                          Glomerular Filtration                           



                          Rate (GFR) and Staging                           



                          of Kidney Disease*                           



                          +---------------------                           



                          --+-------------------                           



                          --+-------------------                           



                          ------+| GFR                           



                          (mL/min/1.73 m2) ?|                           



                          With Kidney Damage ?|                           



                          ?Without Kidney                           



                          Damage+---------------                           



                          --------+-------------                           



                          --------+-------------                           



                          ------------+| ?>90 ?                           



                          ? ? ? ? ? ? ? ?|                           



                          ?Stage one ? ? ? ? ?|                           



                          ? Normal ? ? ? ? ? ? ?                           



                          ?+--------------------                           



                          ---+------------------                           



                          ---+------------------                           



                          -------+| ?60-89 ? ? ?                           



                          ? ? ? ? ?| ?Stage two                           



                          ? ? ? ? ?| ? Decreased                           



                          GFR ? ? ? ?                            



                          +---------------------                           



                          --+-------------------                           



                          --+-------------------                           



                          ------+| ?30-59 ? ? ?                           



                          ? ? ? ? ?| ?Stage                           



                          three ? ? ? ?| ? Stage                           



                          three ? ? ? ? ?                           



                          +---------------------                           



                          --+-------------------                           



                          --+-------------------                           



                          ------+| ?15-29 ? ? ?                           



                          ? ? ? ? ?| ?Stage four                           



                          ? ? ? ? | ? Stage four                           



                          ? ? ? ? ?                              



                          ?+--------------------                           



                          ---+------------------                           



                          ---+------------------                           



                          -------+| ?<15 (or                           



                          dialysis) ? ?| ?Stage                           



                          five ? ? ? ? | ? Stage                           



                          five ? ? ? ? ?                           



                          ?+--------------------                           



                          ---+------------------                           



                          ---+------------------                           



                          -------+ *Each stage                           



                          assumes the associated                           



                          GFR level has been in                           



                          effect for at least                           



                          three months. ?Stages                           



                          1 to 5, with or                           



                          without kidney                           



                          disease, indicate                           



                          chronic kidney                           



                          disease. Notes:                           



                          Determination of                           



                          stages one and two                           



                          (with eGFR                             



                          >59mL/min/1.73 m2)                           



                          requires estimation of                           



                          kidney damage for at                           



                          least three months as                           



                          defined by structural                           



                          or functional                           



                          abnormalities of the                           



                          kidney, manifested by                           



                          either:Pathological                           



                          abnormalities or                           



                          Markers of kidney                           



                          damage (including                           



                          abnormalities in the                           



                          composition of the                           



                          blood or urine or                           



                          abnormalities in                           



                          imaging tests).                           

 

             Lab Interpretation Abnormal                               



             (test code = 62582-1)                                        



[facility]





             Test Item    Value        Reference Range Interpretation Comments

 

             LIPASE (test code = 1552159611) 137 U/L      0-220                 

    

 

             Lab Interpretation (test code = Normal                             

    



             48442-7)                                            



[id number] 04:07:59





             Test Item    Value        Reference Range Interpretation Comments

 

             WBC (test code =              See_Comment  H             [Automated



             6690-2)                                             message] The sy

stem



                                                                 which generated



                                                                 this result



                                                                 transmitted



                                                                 reference range

:



                                                                 4.20 - 10.70



                                                                 10*3/?L. The



                                                                 reference range

 was



                                                                 not used to



                                                                 interpret this



                                                                 result as



                                                                 normal/abnormal

.

 

             RBC (test code =              See_Comment  H             [Automated



             789-8)                                              message] The sy

stem



                                                                 which generated



                                                                 this result



                                                                 transmitted



                                                                 reference range

:



                                                                 4.26 - 5.52



                                                                 10*6/?L. The



                                                                 reference range

 was



                                                                 not used to



                                                                 interpret this



                                                                 result as



                                                                 normal/abnormal

.

 

             HGB (test code = 16.8 g/dL    12.2-16.4    H            



             718-7)                                              

 

             HCT (test code = 49.2 %       38.4-49.3                 



             4544-3)                                             

 

             MCV (test code = 86.2 fL      81.7-95.6                 



             787-2)                                              

 

             MCH (test code = 29.4 pg      26.1-32.7                 



             785-6)                                              

 

             MCHC (test code = 34.1 g/dL    31.2-35.0                 



             786-4)                                              

 

             RDW-SD (test code = 42.6 fL      38.5-51.6                 



             98051-0)                                            

 

             RDW-CV (test code = 13.6 %       12.1-15.4                 



             788-0)                                              

 

             PLT (test code =              See_Comment  H             [Automated



             777-3)                                              message] The sy

stem



                                                                 which generated



                                                                 this result



                                                                 transmitted



                                                                 reference range

:



                                                                 150 - 328 10*3/

?L.



                                                                 The reference r

zhen



                                                                 was not used to



                                                                 interpret this



                                                                 result as



                                                                 normal/abnormal

.

 

             MPV (test code = 10.7 fL      9.8-13.0                  



             06201-4)                                            

 

             NRBC/100 WBC (test              See_Comment                [Automat

ed



             code = 1285537983)                                        message] 

The system



                                                                 which generated



                                                                 this result



                                                                 transmitted



                                                                 reference range

:



                                                                 0.0 - 10.0 /100



                                                                 WBCs. The refer

ence



                                                                 range was not u

sed



                                                                 to interpret th

is



                                                                 result as



                                                                 normal/abnormal

.

 

             NRBC x10^3 (test code <0.01        See_Comment                [Auto

mated



             = 8858852014)                                        message] The s

ystem



                                                                 which generated



                                                                 this result



                                                                 transmitted



                                                                 reference range

:



                                                                 10*3/?L. The



                                                                 reference range

 was



                                                                 not used to



                                                                 interpret this



                                                                 result as



                                                                 normal/abnormal

.

 

             GRAN MAT (NEUT) % 74.2 %                                 



             (test code = 770-8)                                        

 

             IMM GRAN % (test code 0.50 %                                 



             = 2457085228)                                        

 

             LYMPH % (test code = 14.8 %                                 



             736-9)                                              

 

             MONO % (test code = 8.3 %                                  



             5905-5)                                             

 

             EOS % (test code = 1.9 %                                  



             713-8)                                              

 

             BASO % (test code = 0.3 %                                  



             706-2)                                              

 

             GRAN MAT x10^3(ANC) 8.01 10*3/uL 1.99-6.95    H            



             (test code =                                        



             8085456371)                                         

 

             IMM GRAN x10^3 (test 0.05 10*3/uL 0.00-0.06                 



             code = 4849277908)                                        

 

             LYMPH x10^3 (test code 1.59 10*3/uL 1.09-3.23                 



             = 731-0)                                            

 

             MONO x10^3 (test code 0.89 10*3/uL 0.36-1.02                 



             = 742-7)                                            

 

             EOS x10^3 (test code = 0.20 10*3/uL 0.06-0.53                 



             711-2)                                              

 

             BASO x10^3 (test code 0.03 10*3/uL 0.01-0.09                 



             = 704-7)                                            

 

             Lab Interpretation Abnormal                               



             (test code = 81657-2)                                        



[facility] GLUCOSE (AUTOMATED)2022-06-10 17:25:00





             Test Item    Value        Reference Range Interpretation Comments

 

             POCT GLU (test code = 8253592502) 249 mg/dL           H      

      

 

             Lab Interpretation (test code = Abnormal                           

    



             81161-4)                                            



[facility] GLUCOSE (AUTOMATED)2022-06-10 12:54:01





             Test Item    Value        Reference Range Interpretation Comments

 

             POCT GLU (test code = 6310899939) 188 mg/dL           H      

      

 

             Lab Interpretation (test code = Abnormal                           

    



             14966-1)                                            



[facility] METABOLIC PANEL (NA, K, CL, CO2, 
GLUCOSE, BUN, CREATININE, CA)2022-06-10 11:48:23





             Test Item    Value        Reference Range Interpretation Comments

 

             NA (test code = 137 mmol/L   135-145                   



             3987676761)                                         

 

             K (test code = 4.3 mmol/L   3.5-5.0                   



             8004290716)                                         

 

             CL (test code = 105 mmol/L                       



             8751588999)                                         

 

             CO2 TOTAL (test code = 23 mmol/L    23-31                     



             1346415655)                                         

 

             AGAP (test code =              2-16                      



             6563442498)                                         

 

             BUN (test code = 19 mg/dL     7-23                      



             3090466132)                                         

 

             GLUCOSE (test code = 243 mg/dL           H            



             7272718365)                                         

 

             CREATININE (test code = 0.50 mg/dL   0.60-1.25    L            



             0091794685)                                         

 

             CALCIUM (test code = 8.9 mg/dL    8.6-10.6                  



             5105412203)                                         

 

             eGFR (test code =              mL/min/1.73m2              



             1007044538)                                         

 

             MAL (test code = MAL) Association of                           



                          Glomerular Filtration                           



                          Rate (GFR) and Staging                           



                          of Kidney Disease*                           



                          +---------------------                           



                          --+-------------------                           



                          --+-------------------                           



                          ------+| GFR                           



                          (mL/min/1.73 m2) ?|                           



                          With Kidney Damage ?|                           



                          ?Without Kidney                           



                          Damage+---------------                           



                          --------+-------------                           



                          --------+-------------                           



                          ------------+| ?>90 ?                           



                          ? ? ? ? ? ? ? ?|                           



                          ?Stage one ? ? ? ? ?|                           



                          ? Normal ? ? ? ? ? ? ?                           



                          ?+--------------------                           



                          ---+------------------                           



                          ---+------------------                           



                          -------+| ?60-89 ? ? ?                           



                          ? ? ? ? ?| ?Stage two                           



                          ? ? ? ? ?| ? Decreased                           



                          GFR ? ? ? ?                            



                          +---------------------                           



                          --+-------------------                           



                          --+-------------------                           



                          ------+| ?30-59 ? ? ?                           



                          ? ? ? ? ?| ?Stage                           



                          three ? ? ? ?| ? Stage                           



                          three ? ? ? ? ?                           



                          +---------------------                           



                          --+-------------------                           



                          --+-------------------                           



                          ------+| ?15-29 ? ? ?                           



                          ? ? ? ? ?| ?Stage four                           



                          ? ? ? ? | ? Stage four                           



                          ? ? ? ? ?                              



                          ?+--------------------                           



                          ---+------------------                           



                          ---+------------------                           



                          -------+| ?<15 (or                           



                          dialysis) ? ?| ?Stage                           



                          five ? ? ? ? | ? Stage                           



                          five ? ? ? ? ?                           



                          ?+--------------------                           



                          ---+------------------                           



                          ---+------------------                           



                          -------+ *Each stage                           



                          assumes the associated                           



                          GFR level has been in                           



                          effect for at least                           



                          three months. ?Stages                           



                          1 to 5, with or                           



                          without kidney                           



                          disease, indicate                           



                          chronic kidney                           



                          disease. Notes:                           



                          Determination of                           



                          stages one and two                           



                          (with eGFR                             



                          >59mL/min/1.73 m2)                           



                          requires estimation of                           



                          kidney damage for at                           



                          least three months as                           



                          defined by structural                           



                          or functional                           



                          abnormalities of the                           



                          kidney, manifested by                           



                          either:Pathological                           



                          abnormalities or                           



                          Markers of kidney                           



                          damage (including                           



                          abnormalities in the                           



                          composition of the                           



                          blood or urine or                           



                          abnormalities in                           



                          imaging tests).                           

 

             Lab Interpretation Abnormal                               



             (test code = 00268-8)                                        



[facility]





             Test Item    Value        Reference Range Interpretation Comments

 

             MAGNESIUM (test code = 1811215435) 2.1 mg/dL    1.7-2.4            

       

 

             Lab Interpretation (test code = Normal                             

    



             32140-4)                                            



[facility] WITH DIFF2022-06-10 11:09:44





             Test Item    Value        Reference Range Interpretation Comments

 

             WBC (test code =              See_Comment                [Automated

 message]



             9390-2)                                             The system [employer]



                                                                 generated this 

result



                                                                 transmitted ref

erence



                                                                 range: 4.20 - 1

0.70



                                                                 10*3/?L. The re

ference



                                                                 range was not u

sed to



                                                                 interpret this 

result



                                                                 as normal/abnor

mal.

 

             RBC (test code =              See_Comment                [Automated

 message]



             619-8)                                              The system [employer]



                                                                 generated this 

result



                                                                 transmitted ref

erence



                                                                 range: 4.26 - 5

.52



                                                                 10*6/?L. The re

ference



                                                                 range was not u

sed to



                                                                 interpret this 

result



                                                                 as normal/abnor

mal.

 

             HGB (test code = 15.9 g/dL    12.2-16.4                 



             718-7)                                              

 

             HCT (test code = 46.8 %       38.4-49.3                 



             4544-3)                                             

 

             MCV (test code = 87.5 fL      81.7-95.6                 



             787-2)                                              

 

             MCH (test code = 29.7 pg      26.1-32.7                 



             785-6)                                              

 

             MCHC (test code = 34.0 g/dL    31.2-35.0                 



             786-4)                                              

 

             RDW-SD (test code 43.6 fL      38.5-51.6                 



             = 32750-9)                                          

 

             RDW-CV (test code 13.7 %       12.1-15.4                 



             = 788-0)                                            

 

             PLT (test code =              See_Comment                [Automated

 message]



             997-3)                                              The system [employer]



                                                                 generated this 

result



                                                                 transmitted ref

erence



                                                                 range: 150 - 32

8



                                                                 10*3/?L. The re

ference



                                                                 range was not u

sed to



                                                                 interpret this 

result



                                                                 as normal/abnor

mal.

 

             MPV (test code = 11.0 fL      9.8-13.0                  



             87973-6)                                            

 

             NRBC/100 WBC (test              See_Comment                [Automat

ed message]



             code = 4914204850)                                        The syste

m which



                                                                 generated this 

result



                                                                 transmitted ref

erence



                                                                 range: 0.0 - 10

.0 /100



                                                                 WBCs. The refer

ence



                                                                 range was not u

sed to



                                                                 interpret this 

result



                                                                 as normal/abnor

mal.

 

             NRBC x10^3 (test <0.01        See_Comment                [Automated

 message]



             code = 6765521281)                                        The syste

m which



                                                                 generated this 

result



                                                                 transmitted ref

erence



                                                                 range: 10*3/?L.

 The



                                                                 reference range

 was not



                                                                 used to interpr

et this



                                                                 result as



                                                                 normal/abnormal

.

 

             GRAN MAT (NEUT) % 57.9 %                                 



             (test code =                                        



             770-8)                                              

 

             IMM GRAN % (test 0.60 %                                 



             code = 2010146224)                                        

 

             LYMPH % (test code 30.7 %                                 



             = 736-9)                                            

 

             MONO % (test code 8.5 %                                  



             = 5905-5)                                           

 

             EOS % (test code = 2.0 %                                  



             713-8)                                              

 

             BASO % (test code 0.3 %                                  



             = 706-2)                                            

 

             GRAN MAT     5.14 10*3/uL 1.99-6.95                 



             x10^3(ANC) (test                                        



             code = 7537349843)                                        

 

             IMM GRAN x10^3 0.05 10*3/uL 0.00-0.06                 



             (test code =                                        



             5486155589)                                         

 

             LYMPH x10^3 (test 2.73 10*3/uL 1.09-3.23                 



             code = 731-0)                                        

 

             MONO x10^3 (test 0.76 10*3/uL 0.36-1.02                 



             code = 742-7)                                        

 

             EOS x10^3 (test 0.18 10*3/uL 0.06-0.53                 



             code = 711-2)                                        

 

             BASO x10^3 (test 0.03 10*3/uL 0.01-0.09                 



             code = 704-7)                                        



[facility] GLUCOSE (AUTOMATED)2022-06-10 10:34:14





             Test Item    Value        Reference Range Interpretation Comments

 

             POCT GLU (test code = 8549841111) 230 mg/dL           H      

      

 

             Lab Interpretation (test code = Abnormal                           

    



             14484-6)                                            



[facility] GLUCOSE (AUTOMATED)2022-06-10 05:56:50





             Test Item    Value        Reference Range Interpretation Comments

 

             POCT GLU (test code = 3947737676) 314 mg/dL           H      

      

 

             Lab Interpretation (test code = Abnormal                           

    



             12742-1)                                            



[facility] GLUCOSE (AUTOMATED)2022-06-10 04:40:22





             Test Item    Value        Reference Range Interpretation Comments

 

             POCT GLU (test code = 5834378256) 324 mg/dL           H      

      

 

             Lab Interpretation (test code = Abnormal                           

    



             41613-1)                                            



[facility] GLUCOSE (AUTOMATED)2022-06-10 02:37:33





             Test Item    Value        Reference Range Interpretation Comments

 

             POCT GLU (test code = 2424388022) 296 mg/dL           H      

      

 

             Lab Interpretation (test code = Abnormal                           

    



             76199-6)                                            



[facility] GLUCOSE (AUTOMATED)2022-06-10 01:05:02





             Test Item    Value        Reference Range Interpretation Comments

 

             POCT GLU (test code = 0327451058) 319 mg/dL           H      

      

 

             Lab Interpretation (test code = Abnormal                           

    



             73706-7)                                            



[facility] GLUCOSE (AUTOMATED)2022 21:59:09





             Test Item    Value        Reference Range Interpretation Comments

 

             POCT GLU (test code = 5843720277) 257 mg/dL           H      

      

 

             Lab Interpretation (test code = Abnormal                           

    



             75009-6)                                            



[facility] GLUCOSE (AUTOMATED)2022 17:21:41





             Test Item    Value        Reference Range Interpretation Comments

 

             POCT GLU (test code = 5693837355) 214 mg/dL           H      

      

 

             Lab Interpretation (test code = Abnormal                           

    



             01759-9)                                            



[facility] GLUCOSE (AUTOMATED)2022 13:04:04





             Test Item    Value        Reference Range Interpretation Comments

 

             POCT GLU (test code = 8524856225) 234 mg/dL           H      

      

 

             Lab Interpretation (test code = Abnormal                           

    



             03851-4)                                            



[facility] METABOLIC PANEL (NA, K, CL, CO2, 
GLUCOSE, BUN, CREATININE, CA)2022 12:23:33





             Test Item    Value        Reference Range Interpretation Comments

 

             NA (test code = 136 mmol/L   135-145                   



             5269002688)                                         

 

             K (test code = 4.1 mmol/L   3.5-5.0                   



             3704823401)                                         

 

             CL (test code = 109 mmol/L          H            



             5809973393)                                         

 

             CO2 TOTAL (test code = 20 mmol/L    23-31        L            



             6097935001)                                         

 

             AGAP (test code =              2-16                      



             1663176448)                                         

 

             BUN (test code = 16 mg/dL     7-23                      



             2979641598)                                         

 

             GLUCOSE (test code = 281 mg/dL           H            



             6631381405)                                         

 

             CREATININE (test code = 0.50 mg/dL   0.60-1.25    L            



             5677740954)                                         

 

             CALCIUM (test code = 8.3 mg/dL    8.6-10.6     L            



             6197264168)                                         

 

             eGFR (test code =              mL/min/1.73m2              



             4606333797)                                         

 

             MAL (test code = MAL) Association of                           



                          Glomerular Filtration                           



                          Rate (GFR) and Staging                           



                          of Kidney Disease*                           



                          +---------------------                           



                          --+-------------------                           



                          --+-------------------                           



                          ------+| GFR                           



                          (mL/min/1.73 m2) ?|                           



                          With Kidney Damage ?|                           



                          ?Without Kidney                           



                          Damage+---------------                           



                          --------+-------------                           



                          --------+-------------                           



                          ------------+| ?>90 ?                           



                          ? ? ? ? ? ? ? ?|                           



                          ?Stage one ? ? ? ? ?|                           



                          ? Normal ? ? ? ? ? ? ?                           



                          ?+--------------------                           



                          ---+------------------                           



                          ---+------------------                           



                          -------+| ?60-89 ? ? ?                           



                          ? ? ? ? ?| ?Stage two                           



                          ? ? ? ? ?| ? Decreased                           



                          GFR ? ? ? ?                            



                          +---------------------                           



                          --+-------------------                           



                          --+-------------------                           



                          ------+| ?30-59 ? ? ?                           



                          ? ? ? ? ?| ?Stage                           



                          three ? ? ? ?| ? Stage                           



                          three ? ? ? ? ?                           



                          +---------------------                           



                          --+-------------------                           



                          --+-------------------                           



                          ------+| ?15-29 ? ? ?                           



                          ? ? ? ? ?| ?Stage four                           



                          ? ? ? ? | ? Stage four                           



                          ? ? ? ? ?                              



                          ?+--------------------                           



                          ---+------------------                           



                          ---+------------------                           



                          -------+| ?<15 (or                           



                          dialysis) ? ?| ?Stage                           



                          five ? ? ? ? | ? Stage                           



                          five ? ? ? ? ?                           



                          ?+--------------------                           



                          ---+------------------                           



                          ---+------------------                           



                          -------+ *Each stage                           



                          assumes the associated                           



                          GFR level has been in                           



                          effect for at least                           



                          three months. ?Stages                           



                          1 to 5, with or                           



                          without kidney                           



                          disease, indicate                           



                          chronic kidney                           



                          disease. Notes:                           



                          Determination of                           



                          stages one and two                           



                          (with eGFR                             



                          >59mL/min/1.73 m2)                           



                          requires estimation of                           



                          kidney damage for at                           



                          least three months as                           



                          defined by structural                           



                          or functional                           



                          abnormalities of the                           



                          kidney, manifested by                           



                          either:Pathological                           



                          abnormalities or                           



                          Markers of kidney                           



                          damage (including                           



                          abnormalities in the                           



                          composition of the                           



                          blood or urine or                           



                          abnormalities in                           



                          imaging tests).                           

 

             Lab Interpretation Abnormal                               



             (test code = 85274-2)                                        



[facility]





             Test Item    Value        Reference Range Interpretation Comments

 

             MAGNESIUM (test code = 6296995716) 1.6 mg/dL    1.7-2.4      L     

       

 

             Lab Interpretation (test code = Abnormal                           

    



             29553-8)                                            



[facility]





             Test Item    Value        Reference Range Interpretation Comments

 

             PHOSPHORUS (test code = 5454260173) 3.2 mg/dL    2.5-5.0           

        

 

             Lab Interpretation (test code = Normal                             

    



             66059-0)                                            



[facility] 
10:04:37





             Test Item    Value        Reference Range Interpretation Comments

 

             HGB A1C (test code = 9.6 %        4.0-5.7      H            



             4548-4)                                             

 

             MAL (test code = MAL) Reference                              



                          RangesNormal:                           



                          <5.7%Prediabetes:                           



                          5.7 - 6.4%Diabetes:                           



                          > 6.5%                                 

 

             Lab Interpretation (test Abnormal                               



             code = 54500-6)                                        



[id number] 09:29:41





             Test Item    Value        Reference Range Interpretation Comments

 

             WBC (test code =              See_Comment                [Automated

 message]



             6690-2)                                             The system [employer]



                                                                 generated this 

result



                                                                 transmitted ref

erence



                                                                 range: 4.20 - 1

0.70



                                                                 10*3/?L. The re

ference



                                                                 range was not u

sed to



                                                                 interpret this 

result



                                                                 as normal/abnor

mal.

 

             RBC (test code =              See_Comment                [Automated

 message]



             789-8)                                              The system [employer]



                                                                 generated this 

result



                                                                 transmitted ref

erence



                                                                 range: 4.26 - 5

.52



                                                                 10*6/?L. The re

ference



                                                                 range was not u

sed to



                                                                 interpret this 

result



                                                                 as normal/abnor

mal.

 

             HGB (test code = 14.7 g/dL    12.2-16.4                 



             718-7)                                              

 

             HCT (test code = 43.3 %       38.4-49.3                 



             4544-3)                                             

 

             MCV (test code = 86.9 fL      81.7-95.6                 



             787-2)                                              

 

             MCH (test code = 29.5 pg      26.1-32.7                 



             785-6)                                              

 

             MCHC (test code = 33.9 g/dL    31.2-35.0                 



             786-4)                                              

 

             RDW-SD (test code 42.5 fL      38.5-51.6                 



             = 41003-6)                                          

 

             RDW-CV (test code 13.5 %       12.1-15.4                 



             = 788-0)                                            

 

             PLT (test code =              See_Comment                [Automated

 message]



             777-3)                                              The system whic

h



                                                                 generated this 

result



                                                                 transmitted ref

erence



                                                                 range: 150 - 32

8



                                                                 10*3/?L. The re

ference



                                                                 range was not u

sed to



                                                                 interpret this 

result



                                                                 as normal/abnor

mal.

 

             MPV (test code = 10.8 fL      9.8-13.0                  



             70803-9)                                            

 

             NRBC/100 WBC (test              See_Comment                [Automat

ed message]



             code = 1321751189)                                        The syste

m which



                                                                 generated this 

result



                                                                 transmitted ref

erence



                                                                 range: 0.0 - 10

.0 /100



                                                                 WBCs. The refer

ence



                                                                 range was not u

sed to



                                                                 interpret this 

result



                                                                 as normal/abnor

mal.

 

             NRBC x10^3 (test <0.01        See_Comment                [Automated

 message]



             code = 0129844790)                                        The syste

m which



                                                                 generated this 

result



                                                                 transmitted ref

erence



                                                                 range: 10*3/?L.

 The



                                                                 reference range

 was not



                                                                 used to interpr

et this



                                                                 result as



                                                                 normal/abnormal

.

 

             GRAN MAT (NEUT) % 58.6 %                                 



             (test code =                                        



             770-8)                                              

 

             IMM GRAN % (test 0.50 %                                 



             code = 2177134484)                                        

 

             LYMPH % (test code 31.3 %                                 



             = 736-9)                                            

 

             MONO % (test code 6.9 %                                  



             = 5905-5)                                           

 

             EOS % (test code = 2.4 %                                  



             713-8)                                              

 

             BASO % (test code 0.3 %                                  



             = 706-2)                                            

 

             GRAN MAT     5.39 10*3/uL 1.99-6.95                 



             x10^3(ANC) (test                                        



             code = 7408759400)                                        

 

             IMM GRAN x10^3 0.05 10*3/uL 0.00-0.06                 



             (test code =                                        



             6833812318)                                         

 

             LYMPH x10^3 (test 2.89 10*3/uL 1.09-3.23                 



             code = 731-0)                                        

 

             MONO x10^3 (test 0.64 10*3/uL 0.36-1.02                 



             code = 742-7)                                        

 

             EOS x10^3 (test 0.22 10*3/uL 0.06-0.53                 



             code = 711-2)                                        

 

             BASO x10^3 (test 0.03 10*3/uL 0.01-0.09                 



             code = 704-7)                                        



[facility] GLUCOSE (AUTOMATED)2022 09:06:33





             Test Item    Value        Reference Range Interpretation Comments

 

             POCT GLU (test code = 6562485071) 206 mg/dL           H      

      

 

             Lab Interpretation (test code = Abnormal                           

    



             16566-0)                                            



[facility] GLUCOSE (AUTOMATED)2022 04:38:41





             Test Item    Value        Reference Range Interpretation Comments

 

             POCT GLU (test code = 5126923614) 272 mg/dL           H      

      

 

             Lab Interpretation (test code = Abnormal                           

    



             42516-7)                                            



[facility] GLUCOSE (AUTOMATED)2022 01:14:47





             Test Item    Value        Reference Range Interpretation Comments

 

             POCT GLU (test code = 7343884784) 256 mg/dL           H      

      

 

             Lab Interpretation (test code = Abnormal                           

    



             13530-5)                                            



[facility] GLUCOSE (AUTOMATED)2022 21:11:19





             Test Item    Value        Reference Range Interpretation Comments

 

             POCT GLU (test code = 0894600423) 254 mg/dL           H      

      

 

             Lab Interpretation (test code = Abnormal                           

    



             64545-2)                                            



[facility] GLUCOSE (AUTOMATED)2022 17:10:33





             Test Item    Value        Reference Range Interpretation Comments

 

             POCT GLU (test code = 2491087663) 350 mg/dL           H      

      

 

             Lab Interpretation (test code = Abnormal                           

    



             77288-6)                                            



[facility] GLUCOSE (AUTOMATED)2022 12:50:18





             Test Item    Value        Reference Range Interpretation Comments

 

             POCT GLU (test code = 5202402806) 269 mg/dL           H      

      

 

             Lab Interpretation (test code = Abnormal                           

    



             48239-1)                                            



[facility] (NA, K, CL, CO2, 
GLUCOSE, BUN, CREATININE, CA)2022 10:18:27





             Test Item    Value        Reference Range Interpretation Comments

 

             NA (test code = 137 mmol/L   135-145                   



             1972251002)                                         

 

             K (test code = 4.0 mmol/L   3.5-5.0                   



             4227037611)                                         

 

             CL (test code = 108 mmol/L                       



             3151406850)                                         

 

             CO2 TOTAL (test code = 23 mmol/L    23-31                     



             3530981233)                                         

 

             AGAP (test code =              2-16                      



             5011478369)                                         

 

             BUN (test code = 16 mg/dL     7-23                      



             3948157659)                                         

 

             GLUCOSE (test code = 386 mg/dL           H            



             0253112068)                                         

 

             CREATININE (test code = 0.70 mg/dL   0.60-1.25                 



             3006428904)                                         

 

             CALCIUM (test code = 8.6 mg/dL    8.6-10.6                  



             4725371351)                                         

 

             eGFR (test code =              mL/min/1.73m2              



             1580610707)                                         

 

             MAL (test code = MAL) Association of                           



                          Glomerular Filtration                           



                          Rate (GFR) and Staging                           



                          of Kidney Disease*                           



                          +---------------------                           



                          --+-------------------                           



                          --+-------------------                           



                          ------+| GFR                           



                          (mL/min/1.73 m2) ?|                           



                          With Kidney Damage ?|                           



                          ?Without Kidney                           



                          Damage+---------------                           



                          --------+-------------                           



                          --------+-------------                           



                          ------------+| ?>90 ?                           



                          ? ? ? ? ? ? ? ?|                           



                          ?Stage one ? ? ? ? ?|                           



                          ? Normal ? ? ? ? ? ? ?                           



                          ?+--------------------                           



                          ---+------------------                           



                          ---+------------------                           



                          -------+| ?60-89 ? ? ?                           



                          ? ? ? ? ?| ?Stage two                           



                          ? ? ? ? ?| ? Decreased                           



                          GFR ? ? ? ?                            



                          +---------------------                           



                          --+-------------------                           



                          --+-------------------                           



                          ------+| ?30-59 ? ? ?                           



                          ? ? ? ? ?| ?Stage                           



                          three ? ? ? ?| ? Stage                           



                          three ? ? ? ? ?                           



                          +---------------------                           



                          --+-------------------                           



                          --+-------------------                           



                          ------+| ?15-29 ? ? ?                           



                          ? ? ? ? ?| ?Stage four                           



                          ? ? ? ? | ? Stage four                           



                          ? ? ? ? ?                              



                          ?+--------------------                           



                          ---+------------------                           



                          ---+------------------                           



                          -------+| ?<15 (or                           



                          dialysis) ? ?| ?Stage                           



                          five ? ? ? ? | ? Stage                           



                          five ? ? ? ? ?                           



                          ?+--------------------                           



                          ---+------------------                           



                          ---+------------------                           



                          -------+ *Each stage                           



                          assumes the associated                           



                          GFR level has been in                           



                          effect for at least                           



                          three months. ?Stages                           



                          1 to 5, with or                           



                          without kidney                           



                          disease, indicate                           



                          chronic kidney                           



                          disease. Notes:                           



                          Determination of                           



                          stages one and two                           



                          (with eGFR                             



                          >59mL/min/1.73 m2)                           



                          requires estimation of                           



                          kidney damage for at                           



                          least three months as                           



                          defined by structural                           



                          or functional                           



                          abnormalities of the                           



                          kidney, manifested by                           



                          either:Pathological                           



                          abnormalities or                           



                          Markers of kidney                           



                          damage (including                           



                          abnormalities in the                           



                          composition of the                           



                          blood or urine or                           



                          abnormalities in                           



                          imaging tests).                           

 

             Lab Interpretation Abnormal                               



             (test code = 74946-1)                                        



[facility] with [id number] 10:03:31





             Test Item    Value        Reference Range Interpretation Comments

 

             WBC (test code =              See_Comment                [Automated

 message]



             6690-2)                                             The system [employer]



                                                                 generated this 

result



                                                                 transmitted ref

erence



                                                                 range: 4.20 - 1

0.70



                                                                 10*3/?L. The re

ference



                                                                 range was not u

sed to



                                                                 interpret this 

result



                                                                 as normal/abnor

mal.

 

             RBC (test code =              See_Comment                [Automated

 message]



             789-8)                                              The system [employer]



                                                                 generated this 

result



                                                                 transmitted ref

erence



                                                                 range: 4.26 - 5

.52



                                                                 10*6/?L. The re

ference



                                                                 range was not u

sed to



                                                                 interpret this 

result



                                                                 as normal/abnor

mal.

 

             HGB (test code = 15.5 g/dL    12.2-16.4                 



             718-7)                                              

 

             HCT (test code = 45.0 %       38.4-49.3                 



             4544-3)                                             

 

             MCV (test code = 85.9 fL      81.7-95.6                 



             787-2)                                              

 

             MCH (test code = 29.6 pg      26.1-32.7                 



             785-6)                                              

 

             MCHC (test code = 34.4 g/dL    31.2-35.0                 



             786-4)                                              

 

             RDW-SD (test code 42.5 fL      38.5-51.6                 



             = 04587-6)                                          

 

             RDW-CV (test code 13.5 %       12.1-15.4                 



             = 788-0)                                            

 

             PLT (test code =              See_Comment                [Automated

 message]



             777-3)                                              The system [employer]



                                                                 generated this 

result



                                                                 transmitted ref

erence



                                                                 range: 150 - 32

8



                                                                 10*3/?L. The re

ference



                                                                 range was not u

sed to



                                                                 interpret this 

result



                                                                 as normal/abnor

mal.

 

             MPV (test code = 11.2 fL      9.8-13.0                  



             59076-1)                                            

 

             NRBC/100 WBC (test              See_Comment                [Automat

ed message]



             code = 7028920454)                                        The syste

[employer] which



                                                                 generated this 

result



                                                                 transmitted ref

erence



                                                                 range: 0.0 - 10

.0 /100



                                                                 WBCs. The refer

ence



                                                                 range was not u

sed to



                                                                 interpret this 

result



                                                                 as normal/abnor

mal.

 

             NRBC x10^3 (test <0.01        See_Comment                [Automated

 message]



             code = 7034972246)                                        The syste

m which



                                                                 generated this 

result



                                                                 transmitted ref

erence



                                                                 range: 10*3/?L.

 The



                                                                 reference range

 was not



                                                                 used to interpr

et this



                                                                 result as



                                                                 normal/abnormal

.

 

             GRAN MAT (NEUT) % 63.5 %                                 



             (test code =                                        



             743-8)                                              

 

             IMM GRAN % (test 0.30 %                                 



             code = 8818520195)                                        

 

             LYMPH % (test code 24.8 %                                 



             = 736-9)                                            

 

             MONO % (test code 8.7 %                                  



             = 5905-5)                                           

 

             EOS % (test code = 2.5 %                                  



             713-8)                                              

 

             BASO % (test code 0.2 %                                  



             = 706-2)                                            

 

             GRAN MAT     6.05 10*3/uL 1.99-6.95                 



             x10^3(ANC) (test                                        



             code = 9012563736)                                        

 

             IMM GRAN x10^3 0.03 10*3/uL 0.00-0.06                 



             (test code =                                        



             0182284484)                                         

 

             LYMPH x10^3 (test 2.37 10*3/uL 1.09-3.23                 



             code = 731-0)                                        

 

             MONO x10^3 (test 0.83 10*3/uL 0.36-1.02                 



             code = 742-7)                                        

 

             EOS x10^3 (test 0.24 10*3/uL 0.06-0.53                 



             code = 711-2)                                        

 

             BASO x10^3 (test <0.03        0.01-0.09                 



             code = 704-7)                                        



[facility] GLUCOSE (AUTOMATED)2022 09:00:15





             Test Item    Value        Reference Range Interpretation Comments

 

             POCT GLU (test code = 7720269207) 402 mg/dL           H      

      

 

             Lab Interpretation (test code = Abnormal                           

    



             63408-1)                                            



[facility] GLUCOSE (AUTOMATED)2022 04:54:36





             Test Item    Value        Reference Range Interpretation Comments

 

             POCT GLU (test code = 0659230831) 368 mg/dL           H      

      

 

             Lab Interpretation (test code = Abnormal                           

    



             61633-8)                                            



[facility] GLUCOSE (AUTOMATED)2022 03:13:40





             Test Item    Value        Reference Range Interpretation Comments

 

             POCT GLU (test code = 1648754095) 438 mg/dL           H      

      

 

             Lab Interpretation (test code = Abnormal                           

    



             59343-2)                                            



[facility]. METABOLIC PANEL (85989)2022 
01:05:39





             Test Item    Value        Reference Range Interpretation Comments

 

             NA (test code = 133 mmol/L   135-145      L            



             4532583373)                                         

 

             K (test code = 4.7 mmol/L   3.5-5.0                   



             3657030158)                                         

 

             CL (test code = 99 mmol/L                        



             1557006869)                                         

 

             CO2 TOTAL (test code = 21 mmol/L    23-31        L            



             9495350291)                                         

 

             AGAP (test code =              2-16                      



             7474478993)                                         

 

             BUN (test code = 18 mg/dL     7-23                      



             1585735014)                                         

 

             GLUCOSE (test code = 660 mg/dL           HH           



             4175706192)                                         

 

             CREATININE (test code = 0.64 mg/dL   0.60-1.25                 



             1798556288)                                         

 

             TOTAL BILI (test code = 1.0 mg/dL    0.1-1.1                   



             3872005083)                                         

 

             CALCIUM (test code = 9.7 mg/dL    8.6-10.6                  



             7499165110)                                         

 

             T PROTEIN (test code = 7.9 g/dL     6.3-8.2                   



             3141295298)                                         

 

             ALBUMIN (test code = 4.4 g/dL     3.5-5.0                   



             0669747594)                                         

 

             ALK PHOS (test code = 143 U/L             H            



             8620872525)                                         

 

             ALTv (test code = 47 U/L       5-50                      



             2-6)                                             

 

             AST(SGOT) (test code = 30 U/L       13-40                     



             5657256966)                                         

 

             eGFR (test code =              mL/min/1.73m2              



             8551409888)                                         

 

             MAL (test code = MAL) Association of                           



                          Glomerular Filtration                           



                          Rate (GFR) and Staging                           



                          of Kidney Disease*                           



                          +---------------------                           



                          --+-------------------                           



                          --+-------------------                           



                          ------+| GFR                           



                          (mL/min/1.73 m2) ?|                           



                          With Kidney Damage ?|                           



                          ?Without Kidney                           



                          Damage+---------------                           



                          --------+-------------                           



                          --------+-------------                           



                          ------------+| ?>90 ?                           



                          ? ? ? ? ? ? ? ?|                           



                          ?Stage one ? ? ? ? ?|                           



                          ? Normal ? ? ? ? ? ? ?                           



                          ?+--------------------                           



                          ---+------------------                           



                          ---+------------------                           



                          -------+| ?60-89 ? ? ?                           



                          ? ? ? ? ?| ?Stage two                           



                          ? ? ? ? ?| ? Decreased                           



                          GFR ? ? ? ?                            



                          +---------------------                           



                          --+-------------------                           



                          --+-------------------                           



                          ------+| ?30-59 ? ? ?                           



                          ? ? ? ? ?| ?Stage                           



                          three ? ? ? ?| ? Stage                           



                          three ? ? ? ? ?                           



                          +---------------------                           



                          --+-------------------                           



                          --+-------------------                           



                          ------+| ?15-29 ? ? ?                           



                          ? ? ? ? ?| ?Stage four                           



                          ? ? ? ? | ? Stage four                           



                          ? ? ? ? ?                              



                          ?+--------------------                           



                          ---+------------------                           



                          ---+------------------                           



                          -------+| ?<15 (or                           



                          dialysis) ? ?| ?Stage                           



                          five ? ? ? ? | ? Stage                           



                          five ? ? ? ? ?                           



                          ?+--------------------                           



                          ---+------------------                           



                          ---+------------------                           



                          -------+ *Each stage                           



                          assumes the associated                           



                          GFR level has been in                           



                          effect for at least                           



                          three months. ?Stages                           



                          1 to 5, with or                           



                          without kidney                           



                          disease, indicate                           



                          chronic kidney                           



                          disease. Notes:                           



                          Determination of                           



                          stages one and two                           



                          (with eGFR                             



                          >59mL/min/1.73 m2)                           



                          requires estimation of                           



                          kidney damage for at                           



                          least three months as                           



                          defined by structural                           



                          or functional                           



                          abnormalities of the                           



                          kidney, manifested by                           



                          either:Pathological                           



                          abnormalities or                           



                          Markers of kidney                           



                          damage (including                           



                          abnormalities in the                           



                          composition of the                           



                          blood or urine or                           



                          abnormalities in                           



                          imaging tests).                           

 

             Lab Interpretation Abnormal                               



             (test code = 12958-4)                                        



[facility]





             Test Item    Value        Reference Range Interpretation Comments

 

             LIPASE (test code = 9222288435) 255 U/L      0-220        H        

    

 

             Lab Interpretation (test code = Abnormal                           

    



             29940-0)                                            



[id number] 00:38:14





             Test Item    Value        Reference Range Interpretation Comments

 

             WBC (test code =              See_Comment  H             [Automated



             8690-2)                                             message] The sy

stem



                                                                 which generated



                                                                 this result



                                                                 transmitted



                                                                 reference range

:



                                                                 4.20 - 10.70



                                                                 10*3/?L. The



                                                                 reference range

 was



                                                                 not used to



                                                                 interpret this



                                                                 result as



                                                                 normal/abnormal

.

 

             RBC (test code =              See_Comment  H             [Automated



             789-8)                                              message] The sy

stem



                                                                 which generated



                                                                 this result



                                                                 transmitted



                                                                 reference range

:



                                                                 4.26 - 5.52



                                                                 10*6/?L. The



                                                                 reference range

 was



                                                                 not used to



                                                                 interpret this



                                                                 result as



                                                                 normal/abnormal

.

 

             HGB (test code = 16.5 g/dL    12.2-16.4    H            



             718-7)                                              

 

             HCT (test code = 48.9 %       38.4-49.3                 



             4544-3)                                             

 

             MCV (test code = 87.2 fL      81.7-95.6                 



             787-2)                                              

 

             MCH (test code = 29.4 pg      26.1-32.7                 



             785-6)                                              

 

             MCHC (test code = 33.7 g/dL    31.2-35.0                 



             786-4)                                              

 

             RDW-SD (test code = 43.8 fL      38.5-51.6                 



             63787-5)                                            

 

             RDW-CV (test code = 13.6 %       12.1-15.4                 



             788-0)                                              

 

             PLT (test code =              See_Comment                [Automated



             777-3)                                              message] The sy

stem



                                                                 which generated



                                                                 this result



                                                                 transmitted



                                                                 reference range

:



                                                                 150 - 328 10*3/

?L.



                                                                 The reference r

zhen



                                                                 was not used to



                                                                 interpret this



                                                                 result as



                                                                 normal/abnormal

.

 

             MPV (test code = 11.4 fL      9.8-13.0                  



             71982-8)                                            

 

             NRBC/100 WBC (test              See_Comment                [Automat

ed



             code = 9426575021)                                        message] 

The system



                                                                 which generated



                                                                 this result



                                                                 transmitted



                                                                 reference range

:



                                                                 0.0 - 10.0 /100



                                                                 WBCs. The refer

ence



                                                                 range was not u

sed



                                                                 to interpret th

is



                                                                 result as



                                                                 normal/abnormal

.

 

             NRBC x10^3 (test code <0.01        See_Comment                [Auto

mated



             = 4150105455)                                        message] The s

ystem



                                                                 which generated



                                                                 this result



                                                                 transmitted



                                                                 reference range

:



                                                                 10*3/?L. The



                                                                 reference range

 was



                                                                 not used to



                                                                 interpret this



                                                                 result as



                                                                 normal/abnormal

.

 

             GRAN MAT (NEUT) % 76.0 %                                 



             (test code = 770-8)                                        

 

             IMM GRAN % (test code 0.50 %                                 



             = 0614587618)                                        

 

             LYMPH % (test code = 15.2 %                                 



             736-9)                                              

 

             MONO % (test code = 6.5 %                                  



             5905-5)                                             

 

             EOS % (test code = 1.6 %                                  



             713-8)                                              

 

             BASO % (test code = 0.2 %                                  



             706-2)                                              

 

             GRAN MAT x10^3(ANC) 8.55 10*3/uL 1.99-6.95    H            



             (test code =                                        



             1080900227)                                         

 

             IMM GRAN x10^3 (test 0.06 10*3/uL 0.00-0.06                 



             code = 0001503565)                                        

 

             LYMPH x10^3 (test code 1.71 10*3/uL 1.09-3.23                 



             = 731-0)                                            

 

             MONO x10^3 (test code 0.73 10*3/uL 0.36-1.02                 



             = 742-7)                                            

 

             EOS x10^3 (test code = 0.18 10*3/uL 0.06-0.53                 



             711-2)                                              

 

             BASO x10^3 (test code <0.03        0.01-0.09                 



             = 704-7)                                            

 

             Lab Interpretation Abnormal                               



             (test code = 67380-3)                                        



[facility]





             Test Item    Value        Reference Range Interpretation Comments

 

             MAGNESIUM (test code = 6907056475) 1.7 mg/dL    1.7-2.4            

       

 

             Lab Interpretation (test code = Normal                             

    



             82193-3)                                            



[facility] METABOLIC PANEL (NA, K, CL, CO2, 
GLUCOSE, BUN, CREATININE, CA)2022 18:32:38





             Test Item    Value        Reference Range Interpretation Comments

 

             NA (test code = 137 mmol/L   135-145                   



             1620233390)                                         

 

             K (test code = 4.3 mmol/L   3.5-5.0                   



             1523234347)                                         

 

             CL (test code = 105 mmol/L                       



             9924400353)                                         

 

             CO2 TOTAL (test code = 23 mmol/L    23-31                     



             6270145164)                                         

 

             AGAP (test code =              2-16                      



             9731996400)                                         

 

             BUN (test code = 17 mg/dL     7-23                      



             5124669973)                                         

 

             GLUCOSE (test code = 317 mg/dL           H            



             9977589820)                                         

 

             CREATININE (test code = 0.57 mg/dL   0.60-1.25    L            



             5228800267)                                         

 

             CALCIUM (test code = 9.3 mg/dL    8.6-10.6                  



             3114030607)                                         

 

             eGFR (test code =              mL/min/1.73m2              



             8723977387)                                         

 

             MAL (test code = MAL) Association of                           



                          Glomerular Filtration                           



                          Rate (GFR) and Staging                           



                          of Kidney Disease*                           



                          +---------------------                           



                          --+-------------------                           



                          --+-------------------                           



                          ------+| GFR                           



                          (mL/min/1.73 m2) ?|                           



                          With Kidney Damage ?|                           



                          ?Without Kidney                           



                          Damage+---------------                           



                          --------+-------------                           



                          --------+-------------                           



                          ------------+| ?>90 ?                           



                          ? ? ? ? ? ? ? ?|                           



                          ?Stage one ? ? ? ? ?|                           



                          ? Normal ? ? ? ? ? ? ?                           



                          ?+--------------------                           



                          ---+------------------                           



                          ---+------------------                           



                          -------+| ?60-89 ? ? ?                           



                          ? ? ? ? ?| ?Stage two                           



                          ? ? ? ? ?| ? Decreased                           



                          GFR ? ? ? ?                            



                          +---------------------                           



                          --+-------------------                           



                          --+-------------------                           



                          ------+| ?30-59 ? ? ?                           



                          ? ? ? ? ?| ?Stage                           



                          three ? ? ? ?| ? Stage                           



                          three ? ? ? ? ?                           



                          +---------------------                           



                          --+-------------------                           



                          --+-------------------                           



                          ------+| ?15-29 ? ? ?                           



                          ? ? ? ? ?| ?Stage four                           



                          ? ? ? ? | ? Stage four                           



                          ? ? ? ? ?                              



                          ?+--------------------                           



                          ---+------------------                           



                          ---+------------------                           



                          -------+| ?<15 (or                           



                          dialysis) ? ?| ?Stage                           



                          five ? ? ? ? | ? Stage                           



                          five ? ? ? ? ?                           



                          ?+--------------------                           



                          ---+------------------                           



                          ---+------------------                           



                          -------+ *Each stage                           



                          assumes the associated                           



                          GFR level has been in                           



                          effect for at least                           



                          three months. ?Stages                           



                          1 to 5, with or                           



                          without kidney                           



                          disease, indicate                           



                          chronic kidney                           



                          disease. Notes:                           



                          Determination of                           



                          stages one and two                           



                          (with eGFR                             



                          >59mL/min/1.73 m2)                           



                          requires estimation of                           



                          kidney damage for at                           



                          least three months as                           



                          defined by structural                           



                          or functional                           



                          abnormalities of the                           



                          kidney, manifested by                           



                          either:Pathological                           



                          abnormalities or                           



                          Markers of kidney                           



                          damage (including                           



                          abnormalities in the                           



                          composition of the                           



                          blood or urine or                           



                          abnormalities in                           



                          imaging tests).                           

 

             Lab Interpretation Abnormal                               



             (test code = 12982-8)                                        



[facility] WITH [id number] 18:10:18





             Test Item    Value        Reference Range Interpretation Comments

 

             WBC (test code =              See_Comment                [Automated

 message]



             6690-2)                                             The system [employer]



                                                                 generated this 

result



                                                                 transmitted ref

erence



                                                                 range: 4.20 - 1

0.70



                                                                 10*3/?L. The re

ference



                                                                 range was not u

sed to



                                                                 interpret this 

result



                                                                 as normal/abnor

mal.

 

             RBC (test code =              See_Comment                [Automated

 message]



             789-8)                                              The system [employer]



                                                                 generated this 

result



                                                                 transmitted ref

erence



                                                                 range: 4.26 - 5

.52



                                                                 10*6/?L. The re

ference



                                                                 range was not u

sed to



                                                                 interpret this 

result



                                                                 as normal/abnor

mal.

 

             HGB (test code = 16.0 g/dL    12.2-16.4                 



             718-7)                                              

 

             HCT (test code = 47.3 %       38.4-49.3                 



             4544-3)                                             

 

             MCV (test code = 87.1 fL      81.7-95.6                 



             787-2)                                              

 

             MCH (test code = 29.5 pg      26.1-32.7                 



             785-6)                                              

 

             MCHC (test code = 33.8 g/dL    31.2-35.0                 



             786-4)                                              

 

             RDW-SD (test code 44.4 fL      38.5-51.6                 



             = 28142-2)                                          

 

             RDW-CV (test code 13.8 %       12.1-15.4                 



             = 788-0)                                            

 

             PLT (test code =              See_Comment                [Automated

 message]



             777-3)                                              The system [employer]



                                                                 generated this 

result



                                                                 transmitted ref

erence



                                                                 range: 150 - 32

8



                                                                 10*3/?L. The re

ference



                                                                 range was not u

sed to



                                                                 interpret this 

result



                                                                 as normal/abnor

mal.

 

             MPV (test code = 10.9 fL      9.8-13.0                  



             43406-4)                                            

 

             NRBC/100 WBC (test              See_Comment                [Automat

ed message]



             code = 0520018733)                                        The syste

m which



                                                                 generated this 

result



                                                                 transmitted ref

erence



                                                                 range: 0.0 - 10

.0 /100



                                                                 WBCs. The refer

ence



                                                                 range was not u

sed to



                                                                 interpret this 

result



                                                                 as normal/abnor

mal.

 

             NRBC x10^3 (test <0.01        See_Comment                [Automated

 message]



             code = 1800012033)                                        The syste

m which



                                                                 generated this 

result



                                                                 transmitted ref

erence



                                                                 range: 10*3/?L.

 The



                                                                 reference range

 was not



                                                                 used to interpr

et this



                                                                 result as



                                                                 normal/abnormal

.

 

             GRAN MAT (NEUT) % 53.4 %                                 



             (test code =                                        



             770-8)                                              

 

             IMM GRAN % (test 0.30 %                                 



             code = 4336760523)                                        

 

             LYMPH % (test code 35.3 %                                 



             = 736-9)                                            

 

             MONO % (test code 7.4 %                                  



             = 5905-5)                                           

 

             EOS % (test code = 3.3 %                                  



             713-8)                                              

 

             BASO % (test code 0.3 %                                  



             = 706-2)                                            

 

             GRAN MAT     3.59 10*3/uL 1.99-6.95                 



             x10^3(ANC) (test                                        



             code = 7375774380)                                        

 

             IMM GRAN x10^3 <0.03        0.00-0.06                 



             (test code =                                        



             0922410549)                                         

 

             LYMPH x10^3 (test 2.37 10*3/uL 1.09-3.23                 



             code = 731-0)                                        

 

             MONO x10^3 (test 0.50 10*3/uL 0.36-1.02                 



             code = 742-7)                                        

 

             EOS x10^3 (test 0.22 10*3/uL 0.06-0.53                 



             code = 711-2)                                        

 

             BASO x10^3 (test <0.03        0.01-0.09                 



             code = 704-7)                                        



[facility] GLUCOSE (AUTOMATED)2022 16:50:23





             Test Item    Value        Reference Range Interpretation Comments

 

             POCT GLU (test code = 4254370545) 304 mg/dL           H      

      

 

             Lab Interpretation (test code = Abnormal                           

    



             05496-0)                                            



[facility] GLUCOSE (AUTOMATED)2022 12:58:13





             Test Item    Value        Reference Range Interpretation Comments

 

             POCT GLU (test code = 3140012448) 364 mg/dL           H      

      

 

             Lab Interpretation (test code = Abnormal                           

    



             48454-8)                                            



[facility] GLUCOSE (AUTOMATED)2022 09:42:05





             Test Item    Value        Reference Range Interpretation Comments

 

             POCT GLU (test code = 5014365190) 369 mg/dL           H      

      

 

             Lab Interpretation (test code = Abnormal                           

    



             80323-8)                                            



[facility] GLUCOSE (AUTOMATED)2022 05:09:33





             Test Item    Value        Reference Range Interpretation Comments

 

             POCT GLU (test code = 6110491219) 332 mg/dL           H      

      

 

             Lab Interpretation (test code = Abnormal                           

    



             27427-7)                                            



[facility] GLUCOSE (AUTOMATED)2022 01:27:31





             Test Item    Value        Reference Range Interpretation Comments

 

             POCT GLU (test code = 1264360419) 349 mg/dL           H      

      

 

             Lab Interpretation (test code = Abnormal                           

    



             79986-8)                                            



[facility] GLUCOSE (AUTOMATED)2022 21:42:26





             Test Item    Value        Reference Range Interpretation Comments

 

             POCT GLU (test code = 9083170620) 372 mg/dL           H      

      

 

             Lab Interpretation (test code = Abnormal                           

    



             16786-6)                                            



[facility] GLUCOSE (AUTOMATED)2022 17:17:16





             Test Item    Value        Reference Range Interpretation Comments

 

             POCT GLU (test code = 6847180438) 329 mg/dL           H      

      

 

             Lab Interpretation (test code = Abnormal                           

    



             25793-6)                                            



[facility] GLUCOSE (AUTOMATED)2022 14:09:34





             Test Item    Value        Reference Range Interpretation Comments

 

             POCT GLU (test code = 2510629805) 350 mg/dL           H      

      

 

             Lab Interpretation (test code = Abnormal                           

    



             39359-2)                                            



[facility] GLUCOSE (AUTOMATED)2022 09:59:20





             Test Item    Value        Reference Range Interpretation Comments

 

             POCT GLU (test code = 0944787522) 342 mg/dL           H      

      

 

             Lab Interpretation (test code = Abnormal                           

    



             48244-1)                                            



[facility] GLUCOSE (AUTOMATED)2022 01:46:05





             Test Item    Value        Reference Range Interpretation Comments

 

             POCT GLU (test code = 7347575162) 318 mg/dL           H      

      

 

             Lab Interpretation (test code = Abnormal                           

    



             85407-1)                                            



[facility] GLUCOSE (AUTOMATED)2022 21:25:33





             Test Item    Value        Reference Range Interpretation Comments

 

             POCT GLU (test code = 8231424096) 212 mg/dL           H      

      

 

             Lab Interpretation (test code = Abnormal                           

    



             31689-2)                                            



[facility] GLUCOSE (AUTOMATED)2022 16:27:52





             Test Item    Value        Reference Range Interpretation Comments

 

             POCT GLU (test code = 8505299085) 226 mg/dL           H      

      

 

             Lab Interpretation (test code = Abnormal                           

    



             33597-8)                                            



[facility] GLUCOSE (AUTOMATED)2022 15:37:46





             Test Item    Value        Reference Range Interpretation Comments

 

             POCT GLU (test code = 8531740176) 204 mg/dL           H      

      

 

             Lab Interpretation (test code = Abnormal                           

    



             42925-4)                                            



[facility] GLUCOSE (AUTOMATED)2022 14:39:25





             Test Item    Value        Reference Range Interpretation Comments

 

             POCT GLU (test code = 4717177712) 185 mg/dL           H      

      

 

             Lab Interpretation (test code = Abnormal                           

    



             10065-2)                                            



[facility] Metabolic Panel (Na, K, Cl, CO2, 
Glucose, BUN, Creatinine, Ca)2022 14:07:03





             Test Item    Value        Reference Range Interpretation Comments

 

             NA (test code = 137 mmol/L   135-145                   



             8797477916)                                         

 

             K (test code = 4.6 mmol/L   3.5-5.0                   



             9486643074)                                         

 

             CL (test code = 111 mmol/L          H            



             8194235222)                                         

 

             CO2 TOTAL (test code = 22 mmol/L    23-31        L            



             9906298992)                                         

 

             AGAP (test code =              2-16                      



             6250752856)                                         

 

             BUN (test code = 12 mg/dL     7-23                      



             2783207471)                                         

 

             GLUCOSE (test code = 181 mg/dL           H            



             9933141353)                                         

 

             CREATININE (test code = 0.70 mg/dL   0.60-1.25                 



             6700259107)                                         

 

             CALCIUM (test code = 8.3 mg/dL    8.6-10.6     L            



             3409948797)                                         

 

             eGFR (test code =              mL/min/1.73m2              



             0505602343)                                         

 

             MAL (test code = MAL) Association of                           



                          Glomerular Filtration                           



                          Rate (GFR) and Staging                           



                          of Kidney Disease*                           



                          +---------------------                           



                          --+-------------------                           



                          --+-------------------                           



                          ------+| GFR                           



                          (mL/min/1.73 m2) ?|                           



                          With Kidney Damage ?|                           



                          ?Without Kidney                           



                          Damage+---------------                           



                          --------+-------------                           



                          --------+-------------                           



                          ------------+| ?>90 ?                           



                          ? ? ? ? ? ? ? ?|                           



                          ?Stage one ? ? ? ? ?|                           



                          ? Normal ? ? ? ? ? ? ?                           



                          ?+--------------------                           



                          ---+------------------                           



                          ---+------------------                           



                          -------+| ?60-89 ? ? ?                           



                          ? ? ? ? ?| ?Stage two                           



                          ? ? ? ? ?| ? Decreased                           



                          GFR ? ? ? ?                            



                          +---------------------                           



                          --+-------------------                           



                          --+-------------------                           



                          ------+| ?30-59 ? ? ?                           



                          ? ? ? ? ?| ?Stage                           



                          three ? ? ? ?| ? Stage                           



                          three ? ? ? ? ?                           



                          +---------------------                           



                          --+-------------------                           



                          --+-------------------                           



                          ------+| ?15-29 ? ? ?                           



                          ? ? ? ? ?| ?Stage four                           



                          ? ? ? ? | ? Stage four                           



                          ? ? ? ? ?                              



                          ?+--------------------                           



                          ---+------------------                           



                          ---+------------------                           



                          -------+| ?<15 (or                           



                          dialysis) ? ?| ?Stage                           



                          five ? ? ? ? | ? Stage                           



                          five ? ? ? ? ?                           



                          ?+--------------------                           



                          ---+------------------                           



                          ---+------------------                           



                          -------+ *Each stage                           



                          assumes the associated                           



                          GFR level has been in                           



                          effect for at least                           



                          three months. ?Stages                           



                          1 to 5, with or                           



                          without kidney                           



                          disease, indicate                           



                          chronic kidney                           



                          disease. Notes:                           



                          Determination of                           



                          stages one and two                           



                          (with eGFR                             



                          >59mL/min/1.73 m2)                           



                          requires estimation of                           



                          kidney damage for at                           



                          least three months as                           



                          defined by structural                           



                          or functional                           



                          abnormalities of the                           



                          kidney, manifested by                           



                          either:Pathological                           



                          abnormalities or                           



                          Markers of kidney                           



                          damage (including                           



                          abnormalities in the                           



                          composition of the                           



                          blood or urine or                           



                          abnormalities in                           



                          imaging tests).                           

 

             Lab Interpretation Abnormal                               



             (test code = 88149-9)                                        



[facility] GLUCOSE (AUTOMATED)2022 13:45:48





             Test Item    Value        Reference Range Interpretation Comments

 

             POCT GLU (test code = 3690262068) 170 mg/dL           H      

      

 

             Lab Interpretation (test code = Abnormal                           

    



             39787-0)                                            



[facility] GLUCOSE (AUTOMATED)2022 12:28:12





             Test Item    Value        Reference Range Interpretation Comments

 

             POCT GLU (test code = 1163012751) 109 mg/dL                  

      

 

             Lab Interpretation (test code = Normal                             

    



             23348-5)                                            



[facility] GLUCOSE (AUTOMATED)2022 11:25:37





             Test Item    Value        Reference Range Interpretation Comments

 

             POCT GLU (test code = 7847835792) 134 mg/dL           H      

      

 

             Lab Interpretation (test code = Abnormal                           

    



             75974-2)                                            



[facility] (Na, K, Cl, CO2, 
Glucose, BUN, Creatinine, Ca)2022 10:50:17





             Test Item    Value        Reference Range Interpretation Comments

 

             NA (test code = 138 mmol/L   135-145                   



             9805315227)                                         

 

             K (test code = 5.1 mmol/L   3.5-5.0      H            Slight



             1737876862)                                         hemolysis

 

             CL (test code = 110 mmol/L          H            



             4009567497)                                         

 

             CO2 TOTAL (test code 23 mmol/L    23-31                     



             = 3942632796)                                        

 

             AGAP (test code =              2-16                      



             8145250802)                                         

 

             BUN (test code = 13 mg/dL     7-23                      Slight



             7790884847)                                         hemolysis

 

             GLUCOSE (test code = 131 mg/dL           H            



             4116163217)                                         

 

             CREATININE (test code 0.73 mg/dL   0.60-1.25                 



             = 5195210502)                                        

 

             CALCIUM (test code = 8.9 mg/dL    8.6-10.6                  



             7103722328)                                         

 

             eGFR (test code =              mL/min/1.73m2              



             5783541083)                                         

 

             MAL (test code = MAL) Association of                           



                          Glomerular                             



                          Filtration Rate                           



                          (GFR) and Staging                           



                          of Kidney Disease*                           



                          +------------------                           



                          -----+-------------                           



                          --------+----------                           



                          ---------------+|                           



                          GFR (mL/min/1.73                           



                          m2) ?| With Kidney                           



                          Damage ?| ?Without                           



                          Kidney                                 



                          Damage+------------                           



                          -----------+-------                           



                          --------------+----                           



                          -------------------                           



                          --+| ?>90 ? ? ? ? ?                           



                          ? ? ? ?| ?Stage one                           



                          ? ? ? ? ?| ? Normal                           



                          ? ? ? ? ? ? ?                           



                          ?+-----------------                           



                          ------+------------                           



                          ---------+---------                           



                          ----------------+|                           



                          ?60-89 ? ? ? ? ? ?                           



                          ? ?| ?Stage two ? ?                           



                          ? ? ?| ? Decreased                           



                          GFR ? ? ? ?                            



                          +------------------                           



                          -----+-------------                           



                          --------+----------                           



                          ---------------+|                           



                          ?30-59 ? ? ? ? ? ?                           



                          ? ?| ?Stage three ?                           



                          ? ? ?| ? Stage                           



                          three ? ? ? ? ?                           



                          +------------------                           



                          -----+-------------                           



                          --------+----------                           



                          ---------------+|                           



                          ?15-29 ? ? ? ? ? ?                           



                          ? ?| ?Stage four ?                           



                          ? ? ? | ? Stage                           



                          four ? ? ? ? ?                           



                          ?+-----------------                           



                          ------+------------                           



                          ---------+---------                           



                          ----------------+|                           



                          ?<15 (or dialysis)                           



                          ? ?| ?Stage five ?                           



                          ? ? ? | ? Stage                           



                          five ? ? ? ? ?                           



                          ?+-----------------                           



                          ------+------------                           



                          ---------+---------                           



                          ----------------+                           



                          *Each stage assumes                           



                          the associated GFR                           



                          level has been in                           



                          effect for at least                           



                          three months.                           



                          ?Stages 1 to 5,                           



                          with or without                           



                          kidney disease,                           



                          indicate chronic                           



                          kidney disease.                           



                          Notes:                                 



                          Determination of                           



                          stages one and two                           



                          (with eGFR                             



                          >59mL/min/1.73 m2)                           



                          requires estimation                           



                          of kidney damage                           



                          for at least three                           



                          months as defined                           



                          by structural or                           



                          functional                             



                          abnormalities of                           



                          the kidney,                            



                          manifested by                           



                          either:Pathological                           



                          abnormalities or                           



                          Markers of kidney                           



                          damage (including                           



                          abnormalities in                           



                          the composition of                           



                          the blood or urine                           



                          or abnormalities in                           



                          imaging tests).                           

 

             Lab Interpretation Abnormal                               



             (test code = 76007-8)                                        



[facility] GLUCOSE (AUTOMATED)2022 10:35:11





             Test Item    Value        Reference Range Interpretation Comments

 

             POCT GLU (test code = 5402028523) 125 mg/dL           H      

      

 

             Lab Interpretation (test code = Abnormal                           

    



             87271-6)                                            



[facility] GLUCOSE (AUTOMATED)2022 09:31:05





             Test Item    Value        Reference Range Interpretation Comments

 

             POCT GLU (test code = 7333970713) 137 mg/dL           H      

      

 

             Lab Interpretation (test code = Abnormal                           

    



             20769-2)                                            



[facility] GLUCOSE (AUTOMATED)2022 08:28:48





             Test Item    Value        Reference Range Interpretation Comments

 

             POCT GLU (test code = 3011749778) 168 mg/dL           H      

      

 

             Lab Interpretation (test code = Abnormal                           

    



             42945-1)                                            



[facility] GLUCOSE (AUTOMATED)2022 07:26:17





             Test Item    Value        Reference Range Interpretation Comments

 

             POCT GLU (test code = 0318552663) 165 mg/dL           H      

      

 

             Lab Interpretation (test code = Abnormal                           

    



             71122-4)                                            



[facility] (Na, K, Cl, CO2, 
Glucose, BUN, Creatinine, Ca)2022 07:18:59





             Test Item    Value        Reference Range Interpretation Comments

 

             NA (test code = 136 mmol/L   135-145                   



             9704767833)                                         

 

             K (test code = 5.1 mmol/L   3.5-5.0      H            



             9565676421)                                         

 

             CL (test code = 108 mmol/L                       



             7115985036)                                         

 

             CO2 TOTAL (test code = 24 mmol/L    23-31                     



             5225155475)                                         

 

             AGAP (test code =              2-16                      



             9956843145)                                         

 

             BUN (test code = 14 mg/dL     7-23                      



             6902905510)                                         

 

             GLUCOSE (test code = 202 mg/dL           H            



             7541796597)                                         

 

             CREATININE (test code = 0.79 mg/dL   0.60-1.25                 



             4667737971)                                         

 

             CALCIUM (test code = 8.9 mg/dL    8.6-10.6                  



             7947949377)                                         

 

             eGFR (test code =              mL/min/1.73m2              



             9957744892)                                         

 

             MAL (test code = MAL) Association of                           



                          Glomerular Filtration                           



                          Rate (GFR) and Staging                           



                          of Kidney Disease*                           



                          +---------------------                           



                          --+-------------------                           



                          --+-------------------                           



                          ------+| GFR                           



                          (mL/min/1.73 m2) ?|                           



                          With Kidney Damage ?|                           



                          ?Without Kidney                           



                          Damage+---------------                           



                          --------+-------------                           



                          --------+-------------                           



                          ------------+| ?>90 ?                           



                          ? ? ? ? ? ? ? ?|                           



                          ?Stage one ? ? ? ? ?|                           



                          ? Normal ? ? ? ? ? ? ?                           



                          ?+--------------------                           



                          ---+------------------                           



                          ---+------------------                           



                          -------+| ?60-89 ? ? ?                           



                          ? ? ? ? ?| ?Stage two                           



                          ? ? ? ? ?| ? Decreased                           



                          GFR ? ? ? ?                            



                          +---------------------                           



                          --+-------------------                           



                          --+-------------------                           



                          ------+| ?30-59 ? ? ?                           



                          ? ? ? ? ?| ?Stage                           



                          three ? ? ? ?| ? Stage                           



                          three ? ? ? ? ?                           



                          +---------------------                           



                          --+-------------------                           



                          --+-------------------                           



                          ------+| ?15-29 ? ? ?                           



                          ? ? ? ? ?| ?Stage four                           



                          ? ? ? ? | ? Stage four                           



                          ? ? ? ? ?                              



                          ?+--------------------                           



                          ---+------------------                           



                          ---+------------------                           



                          -------+| ?<15 (or                           



                          dialysis) ? ?| ?Stage                           



                          five ? ? ? ? | ? Stage                           



                          five ? ? ? ? ?                           



                          ?+--------------------                           



                          ---+------------------                           



                          ---+------------------                           



                          -------+ *Each stage                           



                          assumes the associated                           



                          GFR level has been in                           



                          effect for at least                           



                          three months. ?Stages                           



                          1 to 5, with or                           



                          without kidney                           



                          disease, indicate                           



                          chronic kidney                           



                          disease. Notes:                           



                          Determination of                           



                          stages one and two                           



                          (with eGFR                             



                          >59mL/min/1.73 m2)                           



                          requires estimation of                           



                          kidney damage for at                           



                          least three months as                           



                          defined by structural                           



                          or functional                           



                          abnormalities of the                           



                          kidney, manifested by                           



                          either:Pathological                           



                          abnormalities or                           



                          Markers of kidney                           



                          damage (including                           



                          abnormalities in the                           



                          composition of the                           



                          blood or urine or                           



                          abnormalities in                           



                          imaging tests).                           

 

             Lab Interpretation Abnormal                               



             (test code = 41217-5)                                        



[id number]57:08





             Test Item    Value        Reference Range Interpretation Comments

 

             BOH (test code = <0.1         mmol/L                    



             8380033830)                                         

 

             MAL (test code = Normal Ranges: ? ?                           



             MAL)         Nonfasting ? ? ? ? ? Less                           



                          than 0.1 mmol/L ? ?                           



                          Overnight Fast ? ? ? Less                           



                          than 0.4 mmol/L ? ? Fasting                           



                          (1-2 weeks) ?6-8 mmol/L Test                          

 



                          developed and                           



                          characteristics determined                           



                          by [facility].                          

 



[facility] GLUCOSE (AUTOMATED)2022 06:24:07





             Test Item    Value        Reference Range Interpretation Comments

 

             POCT GLU (test code = 2934452725) 212 mg/dL           H      

      

 

             Lab Interpretation (test code = Abnormal                           

    



             82737-7)                                            



[facility] GLUCOSE (AUTOMATED)2022 05:28:19





             Test Item    Value        Reference Range Interpretation Comments

 

             POCT GLU (test code = 5854460187) 239 mg/dL           H      

      

 

             Lab Interpretation (test code = Abnormal                           

    



             95393-6)                                            



[facility] 04:29:31





             Test Item    Value        Reference Range Interpretation Comments

 

             POCT GLU (test code = 7879438735) 251 mg/dL           H      

      

 

             Lab Interpretation (test code = Abnormal                           

    



             95133-1)                                            



[facility] Metabolic Panel (Na, K, Cl, CO2, 
Glucose, BUN, Creatinine, Ca)2022 03:49:55





             Test Item    Value        Reference Range Interpretation Comments

 

             NA (test code = 141 mmol/L   135-145                   



             4324153468)                                         

 

             K (test code = 4.2 mmol/L   3.5-5.0                   



             8496259066)                                         

 

             CL (test code = 108 mmol/L                       



             3388157045)                                         

 

             CO2 TOTAL (test code = 26 mmol/L    23-31                     



             6573364246)                                         

 

             AGAP (test code =              2-16                      



             5567072711)                                         

 

             BUN (test code = 14 mg/dL     7-23                      



             0615367318)                                         

 

             GLUCOSE (test code = 164 mg/dL           H            



             2432077035)                                         

 

             CREATININE (test code = 0.84 mg/dL   0.60-1.25                 



             3455644276)                                         

 

             CALCIUM (test code = 9.3 mg/dL    8.6-10.6                  



             0327735349)                                         

 

             eGFR (test code =              mL/min/1.73m2              



             0377738237)                                         

 

             MAL (test code = MAL) Association of                           



                          Glomerular Filtration                           



                          Rate (GFR) and Staging                           



                          of Kidney Disease*                           



                          +---------------------                           



                          --+-------------------                           



                          --+-------------------                           



                          ------+| GFR                           



                          (mL/min/1.73 m2) ?|                           



                          With Kidney Damage ?|                           



                          ?Without Kidney                           



                          Damage+---------------                           



                          --------+-------------                           



                          --------+-------------                           



                          ------------+| ?>90 ?                           



                          ? ? ? ? ? ? ? ?|                           



                          ?Stage one ? ? ? ? ?|                           



                          ? Normal ? ? ? ? ? ? ?                           



                          ?+--------------------                           



                          ---+------------------                           



                          ---+------------------                           



                          -------+| ?60-89 ? ? ?                           



                          ? ? ? ? ?| ?Stage two                           



                          ? ? ? ? ?| ? Decreased                           



                          GFR ? ? ? ?                            



                          +---------------------                           



                          --+-------------------                           



                          --+-------------------                           



                          ------+| ?30-59 ? ? ?                           



                          ? ? ? ? ?| ?Stage                           



                          three ? ? ? ?| ? Stage                           



                          three ? ? ? ? ?                           



                          +---------------------                           



                          --+-------------------                           



                          --+-------------------                           



                          ------+| ?15-29 ? ? ?                           



                          ? ? ? ? ?| ?Stage four                           



                          ? ? ? ? | ? Stage four                           



                          ? ? ? ? ?                              



                          ?+--------------------                           



                          ---+------------------                           



                          ---+------------------                           



                          -------+| ?<15 (or                           



                          dialysis) ? ?| ?Stage                           



                          five ? ? ? ? | ? Stage                           



                          five ? ? ? ? ?                           



                          ?+--------------------                           



                          ---+------------------                           



                          ---+------------------                           



                          -------+ *Each stage                           



                          assumes the associated                           



                          GFR level has been in                           



                          effect for at least                           



                          three months. ?Stages                           



                          1 to 5, with or                           



                          without kidney                           



                          disease, indicate                           



                          chronic kidney                           



                          disease. Notes:                           



                          Determination of                           



                          stages one and two                           



                          (with eGFR                             



                          >59mL/min/1.73 m2)                           



                          requires estimation of                           



                          kidney damage for at                           



                          least three months as                           



                          defined by structural                           



                          or functional                           



                          abnormalities of the                           



                          kidney, manifested by                           



                          either:Pathological                           



                          abnormalities or                           



                          Markers of kidney                           



                          damage (including                           



                          abnormalities in the                           



                          composition of the                           



                          blood or urine or                           



                          abnormalities in                           



                          imaging tests).                           

 

             Lab Interpretation Abnormal                               



             (test code = 78002-0)                                        



[facility] GLUCOSE (AUTOMATED)2022 03:20:15





             Test Item    Value        Reference Range Interpretation Comments

 

             POCT GLU (test code = 7182571258) 174 mg/dL           H      

      

 

             Lab Interpretation (test code = Abnormal                           

    



             89250-8)                                            



[facility] GLUCOSE (AUTOMATED)2022 02:25:54





             Test Item    Value        Reference Range Interpretation Comments

 

             POCT GLU (test code = 5734127927) 158 mg/dL           H      

      

 

             Lab Interpretation (test code = Abnormal                           

    



             43921-7)                                            



[facility] GLUCOSE (AUTOMATED)2022 01:37:09





             Test Item    Value        Reference Range Interpretation Comments

 

             POCT GLU (test code = 9243984427) 219 mg/dL           H      

      

 

             Lab Interpretation (test code = Abnormal                           

    



             65470-8)                                            



[facility] GLUCOSE (AUTOMATED)2022 00:19:48





             Test Item    Value        Reference Range Interpretation Comments

 

             POCT GLU (test code = 7097988551) 345 mg/dL           H      

      

 

             Lab Interpretation (test code = Abnormal                           

    



             10224-7)                                            



[facility] GLUCOSE (AUTOMATED)2022 22:59:57





             Test Item    Value        Reference Range Interpretation Comments

 

             POCT GLU (test code = 2801655244) 318 mg/dL           H      

      

 

             Lab Interpretation (test code = Abnormal                           

    



             86317-0)                                            



[facility] Metabolic Panel (Na, K, Cl, CO2, 
Glucose, BUN, Creatinine, Ca)2022 22:41:00





             Test Item    Value        Reference Range Interpretation Comments

 

             NA (test code = 140 mmol/L   135-145                   



             2309742475)                                         

 

             K (test code = 3.1 mmol/L   3.5-5.0      L            



             4879288884)                                         

 

             CL (test code = 116 mmol/L          H            



             9523627392)                                         

 

             CO2 TOTAL (test code = 21 mmol/L    23-31        L            



             6535674998)                                         

 

             AGAP (test code =              2-16                      



             9429653679)                                         

 

             BUN (test code = 12 mg/dL     7-23                      



             6854851874)                                         

 

             GLUCOSE (test code = 281 mg/dL           H            



             0807580590)                                         

 

             CREATININE (test code = 0.62 mg/dL   0.60-1.25                 



             1295389356)                                         

 

             CALCIUM (test code = 7.2 mg/dL    8.6-10.6     L            



             2836515242)                                         

 

             eGFR (test code =              mL/min/1.73m2              



             7918037543)                                         

 

             MAL (test code = MAL) Association of                           



                          Glomerular Filtration                           



                          Rate (GFR) and Staging                           



                          of Kidney Disease*                           



                          +---------------------                           



                          --+-------------------                           



                          --+-------------------                           



                          ------+| GFR                           



                          (mL/min/1.73 m2) ?|                           



                          With Kidney Damage ?|                           



                          ?Without Kidney                           



                          Damage+---------------                           



                          --------+-------------                           



                          --------+-------------                           



                          ------------+| ?>90 ?                           



                          ? ? ? ? ? ? ? ?|                           



                          ?Stage one ? ? ? ? ?|                           



                          ? Normal ? ? ? ? ? ? ?                           



                          ?+--------------------                           



                          ---+------------------                           



                          ---+------------------                           



                          -------+| ?60-89 ? ? ?                           



                          ? ? ? ? ?| ?Stage two                           



                          ? ? ? ? ?| ? Decreased                           



                          GFR ? ? ? ?                            



                          +---------------------                           



                          --+-------------------                           



                          --+-------------------                           



                          ------+| ?30-59 ? ? ?                           



                          ? ? ? ? ?| ?Stage                           



                          three ? ? ? ?| ? Stage                           



                          three ? ? ? ? ?                           



                          +---------------------                           



                          --+-------------------                           



                          --+-------------------                           



                          ------+| ?15-29 ? ? ?                           



                          ? ? ? ? ?| ?Stage four                           



                          ? ? ? ? | ? Stage four                           



                          ? ? ? ? ?                              



                          ?+--------------------                           



                          ---+------------------                           



                          ---+------------------                           



                          -------+| ?<15 (or                           



                          dialysis) ? ?| ?Stage                           



                          five ? ? ? ? | ? Stage                           



                          five ? ? ? ? ?                           



                          ?+--------------------                           



                          ---+------------------                           



                          ---+------------------                           



                          -------+ *Each stage                           



                          assumes the associated                           



                          GFR level has been in                           



                          effect for at least                           



                          three months. ?Stages                           



                          1 to 5, with or                           



                          without kidney                           



                          disease, indicate                           



                          chronic kidney                           



                          disease. Notes:                           



                          Determination of                           



                          stages one and two                           



                          (with eGFR                             



                          >59mL/min/1.73 m2)                           



                          requires estimation of                           



                          kidney damage for at                           



                          least three months as                           



                          defined by structural                           



                          or functional                           



                          abnormalities of the                           



                          kidney, manifested by                           



                          either:Pathological                           



                          abnormalities or                           



                          Markers of kidney                           



                          damage (including                           



                          abnormalities in the                           



                          composition of the                           



                          blood or urine or                           



                          abnormalities in                           



                          imaging tests).                           

 

             Lab Interpretation Abnormal                               



             (test code = 58645-1)                                        



[facility] GLUCOSE (AUTOMATED)2022 21:53:29





             Test Item    Value        Reference Range Interpretation Comments

 

             POCT GLU (test code = 0785523068) 368 mg/dL           H      

      

 

             Lab Interpretation (test code = Abnormal                           

    



             85715-9)                                            



[facility] GLUCOSE (AUTOMATED)2022 20:41:51





             Test Item    Value        Reference Range Interpretation Comments

 

             POCT GLU (test code = 9203521793) 458 mg/dL           HH     

      

 

             Lab Interpretation (test code = Abnormal                           

    



             95803-9)                                            



[facility] GLUCOSE (AUTOMATED)2022 19:00:13





             Test Item    Value        Reference Range Interpretation Comments

 

             POCT GLU (test code = 4831847416) 369 mg/dL           H      

      

 

             Lab Interpretation (test code = Abnormal                           

    



             25508-4)                                            



[facility] Metabolic Panel (Na, K, Cl, CO2, 
Glucose, BUN, Creatinine, Ca)2022 18:32:29





             Test Item    Value        Reference Range Interpretation Comments

 

             NA (test code = 145 mmol/L   135-145                   



             7052782735)                                         

 

             K (test code = 4.8 mmol/L   3.5-5.0                   Slight



             8151248090)                                         hemolysis

 

             CL (test code = 112 mmol/L          H            



             0263916922)                                         

 

             CO2 TOTAL (test code 26 mmol/L    23-31                     



             = 4977225068)                                        

 

             AGAP (test code =              2-16                      



             5567656262)                                         

 

             BUN (test code = 16 mg/dL     7-23                      Slight



             7579271532)                                         hemolysis

 

             GLUCOSE (test code = 282 mg/dL           H            



             1659307240)                                         

 

             CREATININE (test code 0.83 mg/dL   0.60-1.25                 



             = 3027304651)                                        

 

             CALCIUM (test code = 10.0 mg/dL   8.6-10.6                  



             2667032271)                                         

 

             eGFR (test code =              mL/min/1.73m2              



             3802011665)                                         

 

             MAL (test code = MAL) Association of                           



                          Glomerular                             



                          Filtration Rate                           



                          (GFR) and Staging                           



                          of Kidney Disease*                           



                          +------------------                           



                          -----+-------------                           



                          --------+----------                           



                          ---------------+|                           



                          GFR (mL/min/1.73                           



                          m2) ?| With Kidney                           



                          Damage ?| ?Without                           



                          Kidney                                 



                          Damage+------------                           



                          -----------+-------                           



                          --------------+----                           



                          -------------------                           



                          --+| ?>90 ? ? ? ? ?                           



                          ? ? ? ?| ?Stage one                           



                          ? ? ? ? ?| ? Normal                           



                          ? ? ? ? ? ? ?                           



                          ?+-----------------                           



                          ------+------------                           



                          ---------+---------                           



                          ----------------+|                           



                          ?60-89 ? ? ? ? ? ?                           



                          ? ?| ?Stage two ? ?                           



                          ? ? ?| ? Decreased                           



                          GFR ? ? ? ?                            



                          +------------------                           



                          -----+-------------                           



                          --------+----------                           



                          ---------------+|                           



                          ?30-59 ? ? ? ? ? ?                           



                          ? ?| ?Stage three ?                           



                          ? ? ?| ? Stage                           



                          three ? ? ? ? ?                           



                          +------------------                           



                          -----+-------------                           



                          --------+----------                           



                          ---------------+|                           



                          ?15-29 ? ? ? ? ? ?                           



                          ? ?| ?Stage four ?                           



                          ? ? ? | ? Stage                           



                          four ? ? ? ? ?                           



                          ?+-----------------                           



                          ------+------------                           



                          ---------+---------                           



                          ----------------+|                           



                          ?<15 (or dialysis)                           



                          ? ?| ?Stage five ?                           



                          ? ? ? | ? Stage                           



                          five ? ? ? ? ?                           



                          ?+-----------------                           



                          ------+------------                           



                          ---------+---------                           



                          ----------------+                           



                          *Each stage assumes                           



                          the associated GFR                           



                          level has been in                           



                          effect for at least                           



                          three months.                           



                          ?Stages 1 to 5,                           



                          with or without                           



                          kidney disease,                           



                          indicate chronic                           



                          kidney disease.                           



                          Notes:                                 



                          Determination of                           



                          stages one and two                           



                          (with eGFR                             



                          >59mL/min/1.73 m2)                           



                          requires estimation                           



                          of kidney damage                           



                          for at least three                           



                          months as defined                           



                          by structural or                           



                          functional                             



                          abnormalities of                           



                          the kidney,                            



                          manifested by                           



                          either:Pathological                           



                          abnormalities or                           



                          Markers of kidney                           



                          damage (including                           



                          abnormalities in                           



                          the composition of                           



                          the blood or urine                           



                          or abnormalities in                           



                          imaging tests).                           

 

             Lab Interpretation Abnormal                               



             (test code = 67868-1)                                        



[facility] GLUCOSE (AUTOMATED)2022 17:45:09





             Test Item    Value        Reference Range Interpretation Comments

 

             POCT GLU (test code = 6256154682) 284 mg/dL           H      

      

 

             Lab Interpretation (test code = Abnormal                           

    



             89874-9)                                            



[facility]





             Test Item    Value        Reference Range Interpretation Comments

 

             BOH (test code = 0.9 mmol/L                             



             3800091445)                                         

 

             MAL (test code = Normal Ranges: ? ?                           



             MAL)         Nonfasting ? ? ? ? ? Less                           



                          than 0.1 mmol/L ? ?                           



                          Overnight Fast ? ? ? Less                           



                          than 0.4 mmol/L ? ? Fasting                           



                          (1-2 weeks) ?6-8 mmol/L Test                          

 



                          developed and                           



                          characteristics determined                           



                          by [facility].                          

 



[facility] GLUCOSE (AUTOMATED)2022 16:33:41





             Test Item    Value        Reference Range Interpretation Comments

 

             POCT GLU (test code = 5725166997) 266 mg/dL           H      

      

 

             Lab Interpretation (test code = Abnormal                           

    



             90585-0)                                            



[facility] GLUCOSE (AUTOMATED)2022 15:30:41





             Test Item    Value        Reference Range Interpretation Comments

 

             POCT GLU (test code = 4654353380) 294 mg/dL           H      

      

 

             Lab Interpretation (test code = Abnormal                           

    



             39941-6)                                            



[facility] GLUCOSE (AUTOMATED)2022 14:23:40





             Test Item    Value        Reference Range Interpretation Comments

 

             POCT GLU (test code = 2148988880) 511 mg/dL           HH     

      

 

             Lab Interpretation (test code = Abnormal                           

    



             61799-4)                                            



[facility] WITHOUT [id number] 13:55:53





             Test Item    Value        Reference Range Interpretation Comments

 

             WBC (test code = 6690-2)              See_Comment  H             [A

utomated message]



                                                                 The system [employer]



                                                                 generated this



                                                                 result transmit

huma



                                                                 reference range

:



                                                                 4.20 - 10.70



                                                                 10*3/?L. The



                                                                 reference range

 was



                                                                 not used to



                                                                 interpret this



                                                                 result as



                                                                 normal/abnormal

.

 

             RBC (test code = 789-8)              See_Comment  H             [Au

tomated message]



                                                                 The system [employer]



                                                                 generated this



                                                                 result transmit

huma



                                                                 reference range

:



                                                                 4.26 - 5.52 10*

6/?L.



                                                                 The reference r

zhen



                                                                 was not used to



                                                                 interpret this



                                                                 result as



                                                                 normal/abnormal

.

 

             HGB (test code = 718-7) 16.2 g/dL    12.2-16.4                 

 

             HCT (test code = 4544-3) 48.4 %       38.4-49.3                 

 

             MCH (test code = 785-6) 29.1 pg      26.1-32.7                 

 

             MCV (test code = 787-2) 86.9 fL      81.7-95.6                 

 

             MCHC (test code = 786-4) 33.5 g/dL    31.2-35.0                 

 

             PLT (test code = 777-3)              See_Comment  H             [Au

tomated message]



                                                                 The system [employer]



                                                                 generated this



                                                                 result transmit

huma



                                                                 reference range

: 150



                                                                 - 328 10*3/?L. 

The



                                                                 reference range

 was



                                                                 not used to



                                                                 interpret this



                                                                 result as



                                                                 normal/abnormal

.

 

             MPV (test code = 11.0 fL      9.8-13.0                  



             61128-0)                                            

 

             RDW-CV (test code = 14.2 %       12.1-15.4                 



             788-0)                                              

 

             RDW-SD (test code = 44.8 fL      38.5-51.6                 



             16363-5)                                            

 

             NRBC x10^3 (test code = <0.01        See_Comment                [Au

tomated message]



             5760563689)                                         The system [employer]

h



                                                                 generated this



                                                                 result transmit

huma



                                                                 reference range

:



                                                                 10*3/?L. The



                                                                 reference range

 was



                                                                 not used to



                                                                 interpret this



                                                                 result as



                                                                 normal/abnormal

.

 

             NRBC/100 WBC (test code              See_Comment                [Au

tomated message]



             = 5017053424)                                        The system [employer]



                                                                 generated this



                                                                 result transmit

huma



                                                                 reference range

: 0.0



                                                                 - 10.0 /100 WBC

s.



                                                                 The reference r

zhen



                                                                 was not used to



                                                                 interpret this



                                                                 result as



                                                                 normal/abnormal

.

 

             IPF % (test code =                                        



             6621152863)                                         

 

             Lab Interpretation (test Abnormal                               



             code = 08932-7)                                        



[facility] GLUCOSE (AUTOMATED)2022 13:34:21





             Test Item    Value        Reference Range Interpretation Comments

 

             POCT GLU (test code = 6354817906) 538 mg/dL           HH     

      

 

             Lab Interpretation (test code = Abnormal                           

    



             04592-2)                                            



[facility] GLUCOSE (AUTOMATED)2022 13:30:43





             Test Item    Value        Reference Range Interpretation Comments

 

             POCT GLU (test code = 9555983771) >600                HH     

      

 

             Lab Interpretation (test code = Abnormal                           

    



             83079-5)                                            



[facility] GLUCOSE (AUTOMATED)2022 13:30:43





             Test Item    Value        Reference Range Interpretation Comments

 

             POCT GLU (test code = 4160194410) >600                HH     

      

 

             Lab Interpretation (test code = Abnormal                           

    



             08111-6)                                            



[facility] GLUCOSE (AUTOMATED)2022 13:30:43





             Test Item    Value        Reference Range Interpretation Comments

 

             POCT GLU (test code = >600                HH           Notifi

ed Provider



             1523573457)                                         

 

             Lab Interpretation (test Abnormal                               



             code = 36421-8)                                        



[facility] GLUCOSE (AUTOMATED)2022 13:30:43





             Test Item    Value        Reference Range Interpretation Comments

 

             POCT GLU (test code = 5850206720) >600                HH     

      

 

             Lab Interpretation (test code = Abnormal                           

    



             53914-8)                                            



[facility] GLUCOSE (AUTOMATED)2022 13:30:38





             Test Item    Value        Reference Range Interpretation Comments

 

             POCT GLU (test code = 8051865221) >600                HH     

      

 

             Lab Interpretation (test code = Abnormal                           

    



             16673-7)                                            



[facility]38





             Test Item    Value        Reference Range Interpretation Comments

 

             POCT GLU (test code = 2602033888) >600                HH     

      

 

             Lab Interpretation (test code = Abnormal                           

    



             06849-6)                                            



[id number] 12:37:44





             Test Item    Value        Reference Range Interpretation Comments

 

             OSMOLALITY (test code =              See_Comment  HH            [Au

tomated message]



             2692-2)                                             The system [employer]



                                                                 generated this 

result



                                                                 transmitted ref

erence



                                                                 range: 278 - 30

5



                                                                 mOsm/kg. The



                                                                 reference range

 was



                                                                 not used to int

erpret



                                                                 this result as



                                                                 normal/abnormal

.

 

             Lab Interpretation (test Abnormal                               



             code = 18474-5)                                        



[facility] Metabolic Panel (Na, K, Cl, CO2, 
Glucose, BUN, Creatinine, Ca)2022 12:23:35





             Test Item    Value        Reference Range Interpretation Comments

 

             NA (test code = 145 mmol/L   135-145                   



             7564006565)                                         

 

             K (test code = 4.4 mmol/L   3.5-5.0                   



             7821240012)                                         

 

             CL (test code = 109 mmol/L          H            



             2847282277)                                         

 

             CO2 TOTAL (test code = 21 mmol/L    23-31        L            



             1214543723)                                         

 

             AGAP (test code =              2-16                      



             1780803108)                                         

 

             BUN (test code = 15 mg/dL     7-23                      



             4325031025)                                         

 

             GLUCOSE (test code = 753 mg/dL           HH           



             7529213426)                                         

 

             CREATININE (test code = 0.79 mg/dL   0.60-1.25                 



             2859903152)                                         

 

             CALCIUM (test code = 10.9 mg/dL   8.6-10.6     H            



             7106483399)                                         

 

             eGFR (test code =              mL/min/1.73m2              



             3062706761)                                         

 

             MAL (test code = MAL) Association of                           



                          Glomerular Filtration                           



                          Rate (GFR) and Staging                           



                          of Kidney Disease*                           



                          +---------------------                           



                          --+-------------------                           



                          --+-------------------                           



                          ------+| GFR                           



                          (mL/min/1.73 m2) ?|                           



                          With Kidney Damage ?|                           



                          ?Without Kidney                           



                          Damage+---------------                           



                          --------+-------------                           



                          --------+-------------                           



                          ------------+| ?>90 ?                           



                          ? ? ? ? ? ? ? ?|                           



                          ?Stage one ? ? ? ? ?|                           



                          ? Normal ? ? ? ? ? ? ?                           



                          ?+--------------------                           



                          ---+------------------                           



                          ---+------------------                           



                          -------+| ?60-89 ? ? ?                           



                          ? ? ? ? ?| ?Stage two                           



                          ? ? ? ? ?| ? Decreased                           



                          GFR ? ? ? ?                            



                          +---------------------                           



                          --+-------------------                           



                          --+-------------------                           



                          ------+| ?30-59 ? ? ?                           



                          ? ? ? ? ?| ?Stage                           



                          three ? ? ? ?| ? Stage                           



                          three ? ? ? ? ?                           



                          +---------------------                           



                          --+-------------------                           



                          --+-------------------                           



                          ------+| ?15-29 ? ? ?                           



                          ? ? ? ? ?| ?Stage four                           



                          ? ? ? ? | ? Stage four                           



                          ? ? ? ? ?                              



                          ?+--------------------                           



                          ---+------------------                           



                          ---+------------------                           



                          -------+| ?<15 (or                           



                          dialysis) ? ?| ?Stage                           



                          five ? ? ? ? | ? Stage                           



                          five ? ? ? ? ?                           



                          ?+--------------------                           



                          ---+------------------                           



                          ---+------------------                           



                          -------+ *Each stage                           



                          assumes the associated                           



                          GFR level has been in                           



                          effect for at least                           



                          three months. ?Stages                           



                          1 to 5, with or                           



                          without kidney                           



                          disease, indicate                           



                          chronic kidney                           



                          disease. Notes:                           



                          Determination of                           



                          stages one and two                           



                          (with eGFR                             



                          >59mL/min/1.73 m2)                           



                          requires estimation of                           



                          kidney damage for at                           



                          least three months as                           



                          defined by structural                           



                          or functional                           



                          abnormalities of the                           



                          kidney, manifested by                           



                          either:Pathological                           



                          abnormalities or                           



                          Markers of kidney                           



                          damage (including                           



                          abnormalities in the                           



                          composition of the                           



                          blood or urine or                           



                          abnormalities in                           



                          imaging tests).                           

 

             Lab Interpretation Abnormal                               



             (test code = 62329-7)                                        



[facility] 12[facility]56





             Test Item    Value        Reference Range Interpretation Comments

 

             POCT GLU (test code = 9326623468) 564 mg/dL           HH     

      

 

             Lab Interpretation (test code = Abnormal                           

    



             31962-0)                                            



[facility] [id number] 12:05:40





             Test Item    Value        Reference Range Interpretation Comments

 

             MAGNESIUM (test code = 1046149714) 2.5 mg/dL    1.7-2.4      H     

       

 

             Lab Interpretation (test code = Abnormal                           

    



             31281-8)                                            



[facility] 12:05:20





             Test Item    Value        Reference Range Interpretation Comments

 

             MAGNESIUM (test code = 2499304221) 2.5 mg/dL    1.7-2.4      H     

       

 

             Lab Interpretation (test code = Abnormal                           

    



             81776-0)                                            



[facility] [id number] 12:05:20





             Test Item    Value        Reference Range Interpretation Comments

 

             PHOSPHORUS (test code = 1100827234) 6.2 mg/dL    2.5-5.0      H    

        

 

             Lab Interpretation (test code = Abnormal                           

    



             86927-9)                                            



[facility] LACTIC [id number] 05:53:48





             Test Item    Value        Reference Range Interpretation Comments

 

             PH (test code =              7.32-7.42                 



             3165970435)                                         

 

             PCO2 MARCELINA (test code              See_Comment                [[employer]

huma



             = 1303238508)                                        message] The



                                                                 system which



                                                                 generated this



                                                                 result



                                                                 transmitted



                                                                 reference range

:



                                                                 41 - 51 mmHg. T

he



                                                                 reference range



                                                                 was not used to



                                                                 interpret this



                                                                 result as



                                                                 normal/abnormal

.

 

             PO2 MARCELINA (test code =              See_Comment  HH            [Autom

ated



             [id number])                                         message] The



                                                                 system which



                                                                 generated this



                                                                 result



                                                                 transmitted



                                                                 reference range

:



                                                                 25 - 40 mmHg. T

he



                                                                 reference range



                                                                 was not used to



                                                                 interpret this



                                                                 result as



                                                                 normal/abnormal

.

 

             HCO3 MARCELINA (test code              See_Comment  L             [Automa

huma



             = [id number])                                        message] The



                                                                 system which



                                                                 generated this



                                                                 result



                                                                 transmitted



                                                                 reference range

:



                                                                 24 - 28 mEq/L.



                                                                 The reference



                                                                 range was not



                                                                 used to interpr

et



                                                                 this result as



                                                                 normal/abnormal

.

 

             AC VBE(BEAKER) (test              mEq/L                     



             code = 7887288348)                                        

 

             THB MARCELINA (test code = 17.7 g/dL    13.5-18.0                 



             4465371524)                                         

 

             %O2HB MARCELINA (test code 93.8 %       52.0-63.0    H            



             = 7001030890)                                        

 

             %COHB MARCELINA (test code 2.1 %        0.0-1.5      H            



             = 1872076571)                                        

 

             %METHB MARCELINA (test 0.1 %        0.4-1.5      L            



             code = 7039353250)                                        

 

             VOL%O2 MARCELINA (test 23.3 %       6.0-12.0     H            



             code = 8037821704)                                        

 

             NA (test code = 142 mmol/L   135-145                   



             0724718846)                                         

 

             K+ (test code = 4.4 mmol/L   3.5-5.0                   



             7057552574)                                         

 

             AC CA IONZ (test 5.40 mg/dL   4.50-5.30    H            



             code = 4190601487)                                        

 

             GLUCOSE (test code = <20                 LL           



             2043340402)                                         

 

             LACTIC ACID (test 3.37 mmol/L  0.50-2.20    H            



             code = 2387448745)                                        

 

             MAL (test code = *ac gluc                               



             MAL)         unmeasurable                           

 

             Lab Interpretation Abnormal                               



             (test code =                                        



             41180-6)                                            



[facility]. METABOLIC PANEL ([id number])2022 
04:59:53





             Test Item    Value        Reference Range Interpretation Comments

 

             NA (test code = 140 mmol/L   135-145                   



             7532863396)                                         

 

             K (test code = 4.8 mmol/L   3.5-5.0                   



             3656746909)                                         

 

             CL (test code = 100 mmol/L                       



             6779738256)                                         

 

             CO2 TOTAL (test code = 20 mmol/L    23-31        L            



             6245515265)                                         

 

             AGAP (test code =              2-16         H            



             4762402601)                                         

 

             BUN (test code = 14 mg/dL     7-23                      



             3871638289)                                         

 

             GLUCOSE (test code = 1083 mg/dL          HH           



             4023990192)                                         

 

             CREATININE (test code = 0.80 mg/dL   0.60-1.25                 



             9373911321)                                         

 

             TOTAL BILI (test code = 1.0 mg/dL    0.1-1.1                   



             8577576466)                                         

 

             CALCIUM (test code = 12.0 mg/dL   8.6-10.6     H            



             6481533480)                                         

 

             T PROTEIN (test code = 8.1 g/dL     6.3-8.2                   



             4728601340)                                         

 

             ALBUMIN (test code = 4.7 g/dL     3.5-5.0                   



             3965404441)                                         

 

             ALK PHOS (test code = 173 U/L             H            



             5550305904)                                         

 

             ALTv (test code = 36 U/L       5-50                      



             1742-6)                                             

 

             AST(SGOT) (test code = 18 U/L       13-40                     



             8804402856)                                         

 

             eGFR (test code =              mL/min/1.73m2              



             6222701674)                                         

 

             MAL (test code = MAL) Association of                           



                          Glomerular Filtration                           



                          Rate (GFR) and Staging                           



                          of Kidney Disease*                           



                          +---------------------                           



                          --+-------------------                           



                          --+-------------------                           



                          ------+| GFR                           



                          (mL/min/1.73 m2) ?|                           



                          With Kidney Damage ?|                           



                          ?Without Kidney                           



                          Damage+---------------                           



                          --------+-------------                           



                          --------+-------------                           



                          ------------+| ?>90 ?                           



                          ? ? ? ? ? ? ? ?|                           



                          ?Stage one ? ? ? ? ?|                           



                          ? Normal ? ? ? ? ? ? ?                           



                          ?+--------------------                           



                          ---+------------------                           



                          ---+------------------                           



                          -------+| ?60-89 ? ? ?                           



                          ? ? ? ? ?| ?Stage two                           



                          ? ? ? ? ?| ? Decreased                           



                          GFR ? ? ? ?                            



                          +---------------------                           



                          --+-------------------                           



                          --+-------------------                           



                          ------+| ?30-59 ? ? ?                           



                          ? ? ? ? ?| ?Stage                           



                          three ? ? ? ?| ? Stage                           



                          three ? ? ? ? ?                           



                          +---------------------                           



                          --+-------------------                           



                          --+-------------------                           



                          ------+| ?15-29 ? ? ?                           



                          ? ? ? ? ?| ?Stage four                           



                          ? ? ? ? | ? Stage four                           



                          ? ? ? ? ?                              



                          ?+--------------------                           



                          ---+------------------                           



                          ---+------------------                           



                          -------+| ?<15 (or                           



                          dialysis) ? ?| ?Stage                           



                          five ? ? ? ? | ? Stage                           



                          five ? ? ? ? ?                           



                          ?+--------------------                           



                          ---+------------------                           



                          ---+------------------                           



                          -------+ *Each stage                           



                          assumes the associated                           



                          GFR level has been in                           



                          effect for at least                           



                          three months. ?Stages                           



                          1 to 5, with or                           



                          without kidney                           



                          disease, indicate                           



                          chronic kidney                           



                          disease. Notes:                           



                          Determination of                           



                          stages one and two                           



                          (with eGFR                             



                          >59mL/min/1.73 m2)                           



                          requires estimation of                           



                          kidney damage for at                           



                          least three months as                           



                          defined by structural                           



                          or functional                           



                          abnormalities of the                           



                          kidney, manifested by                           



                          either:Pathological                           



                          abnormalities or                           



                          Markers of kidney                           



                          damage (including                           



                          abnormalities in the                           



                          composition of the                           



                          blood or urine or                           



                          abnormalities in                           



                          imaging tests).                           

 

             Lab Interpretation Abnormal                               



             (test code = 21485-3)                                        



[facility]





             Test Item    Value        Reference    Interpretation Comments



                                       Range                     

 

             TROPONIN I (test <0.012       See_Comment                [Automated



             code = 7940174007)                                        message] 

The



                                                                 system which



                                                                 generated this



                                                                 result



                                                                 transmitted



                                                                 reference range

:



                                                                 <=0.034 ng/mL.



                                                                 The reference



                                                                 range was not



                                                                 used to



                                                                 interpret this



                                                                 result as



                                                                 normal/abnormal

.

 

             MAL (test code = Reference (Normal)                           



             MAL)         Range (defined by                           



                          the 99th percentile                           



                          reference limit): <=                           



                          0.034 ng/mL Note:                           



                          Cardiac troponin                           



                          begins to rise 3-4                           



                          hours after the                           



                          onset of ischemia.                           



                          Repeat in 4-6 hours                           



                          if the sample was                           



                          drawn within 3-4                           



                          hours of the onset                           



                          of the symptom and                           



                          found normal.                           



                          Diagnosis of                           



                          myocardial injury is                           



                          made with acute                           



                          changes in cTn                           



                          concentrations with                           



                          at least one serial                           



                          sample above the                           



                          99th percentile                           



                          upper reference                           



                          limit (URL), taken                           



                          together with the                           



                          patient's clinical                           



                          presentation. Biotin                           



                          has been reported to                           



                          cause a negative                           



                          bias, interpret                           



                          results relative to                           



                          patient's use of                           



                          biotin.                                

 

             Lab Interpretation Normal                                 



             (test code =                                        



             80602-3)                                            



[id number]48:23





             Test Item    Value        Reference Range Interpretation Comments

 

             NT-proBNP (test code 71 pg/mL     See_Comment                [Autom

ated



             = 2693438150)                                        message] The



                                                                 system which



                                                                 generated this



                                                                 result



                                                                 transmitted



                                                                 reference range

:



                                                                 <=125. The



                                                                 reference range



                                                                 was not used to



                                                                 interpret this



                                                                 result as



                                                                 normal/abnormal

.

 

             MAL (test code = MAL) Biotin has been                           



                          reported to                            



                          cause a negative                           



                          bias, interpret                           



                          results relative                           



                          to patient's use                           



                          of biotin.                             

 

             Lab Interpretation Normal                                 



             (test code = 88568-0)                                        



[id number] 03:48:23





             Test Item    Value        Reference Range Interpretation Comments

 

             NT-proBNP (test code 71 pg/mL     See_Comment                [Autom

ated



             = 6166730646)                                        message] The



                                                                 system which



                                                                 generated this



                                                                 result



                                                                 transmitted



                                                                 reference range

:



                                                                 <=125. The



                                                                 reference range



                                                                 was not used to



                                                                 interpret this



                                                                 result as



                                                                 normal/abnormal

.

 

             MAL (test code = MAL) Biotin has been                           



                          reported to                            



                          cause a negative                           



                          bias, interpret                           



                          results relative                           



                          to patient's use                           



                          of biotin.                             

 

             Lab Interpretation Normal                                 



             (test code = 69502-6)                                        



[facility]





             Test Item    Value        Reference Range Interpretation Comments

 

             LIPASE (test code = 3289259106) 120 U/L      0-220                 

    

 

             Lab Interpretation (test code = Normal                             

    



             35619-6)                                            



[facility] [id number] 
03:38:23





             Test Item    Value        Reference Range Interpretation Comments

 

             APTT Patient (test              See_Comment                [Automat

ed



             code = 3173-2)                                        message] The



                                                                 system which



                                                                 generated this



                                                                 result



                                                                 transmitted



                                                                 reference range

:



                                                                 23 - 38 Seconds

.



                                                                 The reference



                                                                 range was not



                                                                 used to interpr

et



                                                                 this result as



                                                                 normal/abnormal

.

 

             MAL (test code = MAL) The [facility] patient                           



                          population mean                           



                          normal value for                           



                          aPTT is 30                             



                          seconds.                               

 

             Lab Interpretation Normal                                 



             (test code = 78960-8)                                        



[facility] [id number] 
03:38:23





             Test Item    Value        Reference Range Interpretation Comments

 

             APTT Patient (test              See_Comment                [Automat

ed



             code = 3173-2)                                        message] The



                                                                 system which



                                                                 generated this



                                                                 result



                                                                 transmitted



                                                                 reference range

:



                                                                 23 - 38 Seconds

.



                                                                 The reference



                                                                 range was not



                                                                 used to interpr

et



                                                                 this result as



                                                                 normal/abnormal

.

 

             MAL (test code = MAL) The [facility] patient                           



                          population mean                           



                          normal value for                           



                          aPTT is 30                             



                          seconds.                               

 

             Lab Interpretation Normal                                 



             (test code = 91532-9)                                        



[id number]36[facility]22





             Test Item    Value        Reference Range Interpretation Comments

 

             PROTIME PATIENT (test              See_Comment                [Auto

mated message]



             code = 5964-2)                                        The system wh

ich



                                                                 generated this 

result



                                                                 transmitted ref

erence



                                                                 range: 12.0 - 1

4.7



                                                                 Seconds. The re

ference



                                                                 range was not u

sed to



                                                                 interpret this 

result



                                                                 as normal/abnor

mal.

 

             INR (test code = 6301-6)                                        Nor

mal INR <1.1;



                                                                 Warfarin Therap

eutic



                                                                 range 2.0 to 3.

0 or



                                                                 2.5 to 3.5, dep

ending



                                                                 upon the indica

tions.

 

             Lab Interpretation (test Normal                                 



             code = 11760-5)                                        



[facility] WITH [id number] 03:35:42





             Test Item    Value        Reference Range Interpretation Comments

 

             WBC (test code =              See_Comment  H             [Automated



             6690-2)                                             message] The



                                                                 system which



                                                                 generated this



                                                                 result transmit

huma



                                                                 reference range

:



                                                                 4.20 - 10.70



                                                                 10*3/?L. The



                                                                 reference range



                                                                 was not used to



                                                                 interpret this



                                                                 result as



                                                                 normal/abnormal

.

 

             RBC (test code =              See_Comment  H             [Automated



             789-8)                                              message] The



                                                                 system which



                                                                 generated this



                                                                 result transmit

huma



                                                                 reference range

:



                                                                 4.26 - 5.52



                                                                 10*6/?L. The



                                                                 reference range



                                                                 was not used to



                                                                 interpret this



                                                                 result as



                                                                 normal/abnormal

.

 

             HGB (test code = 17.8 g/dL    12.2-16.4    H            



             718-7)                                              

 

             HCT (test code = 51.7 %       38.4-49.3    H            



             4544-3)                                             

 

             MCV (test code = 86.9 fL      81.7-95.6                 



             787-2)                                              

 

             MCH (test code = 29.9 pg      26.1-32.7                 



             785-6)                                              

 

             MCHC (test code = 34.4 g/dL    31.2-35.0                 



             786-4)                                              

 

             RDW-SD (test code = 44.7 fL      38.5-51.6                 



             31984-8)                                            

 

             RDW-CV (test code = 14.2 %       12.1-15.4                 



             788-0)                                              

 

             PLT (test code =              See_Comment  H             [Automated



             777-3)                                              message] The



                                                                 system which



                                                                 generated this



                                                                 result transmit

huma



                                                                 reference range

:



                                                                 150 - 328 10*3/

?L.



                                                                 The reference



                                                                 range was not u

sed



                                                                 to interpret th

is



                                                                 result as



                                                                 normal/abnormal

.

 

             MPV (test code = 11.5 fL      9.8-13.0                  



             38179-5)                                            

 

             NRBC/100 WBC (test              See_Comment                [Automat

ed



             code = 4053802954)                                        message] 

The



                                                                 system which



                                                                 generated this



                                                                 result transmit

huma



                                                                 reference range

:



                                                                 0.0 - 10.0 /100



                                                                 WBCs. The



                                                                 reference range



                                                                 was not used to



                                                                 interpret this



                                                                 result as



                                                                 normal/abnormal

.

 

             NRBC x10^3 (test code <0.01        See_Comment                [Auto

mated



             = [id number])                                        message] The



                                                                 system which



                                                                 generated this



                                                                 result transmit

huma



                                                                 reference range

:



                                                                 10*3/?L. The



                                                                 reference range



                                                                 was not used to



                                                                 interpret this



                                                                 result as



                                                                 normal/abnormal

.

 

             GRAN MAT (NEUT) % 85.2 %                                 



             (test code = 770-8)                                        

 

             IMM GRAN % (test code 0.90 %                                 



             = [id number])                                        

 

             LYMPH % (test code = 5.9 %                                  



             736-9)                                              

 

             MONO % (test code = 7.8 %                                  



             5905-5)                                             

 

             EOS % (test code = 0.0 %                                  



             713-8)                                              

 

             BASO % (test code = 0.2 %                                  



             706-2)                                              

 

             GRAN MAT x10^3(ANC) 12.85 10*3/uL 1.99-6.95    H            



             (test code =                                        



             7634770194)                                         

 

             IMM GRAN x10^3 (test 0.13 10*3/uL 0.00-0.06    H            



             code = 8725294208)                                        

 

             LYMPH x10^3 (test code 0.89 10*3/uL 1.09-3.23    L            



             = 731-0)                                            

 

             MONO x10^3 (test code 1.17 10*3/uL 0.36-1.02    H            



             = 742-7)                                            

 

             EOS x10^3 (test code = <0.03        0.06-0.53    L            



             711-2)                                              

 

             BASO x10^3 (test code 0.03 10*3/uL 0.01-0.09                 



             = 704-7)                                            

 

             Lab Interpretation Abnormal                               



             (test code = 98828-3)                                        



[id number]:23





             Test Item    Value        Reference Range Interpretation Comments

 

             LACTIC ACID (test code = 4.52 mmol/L  0.50-2.20    H            



             8790789716)                                         

 

             Lab Interpretation (test code = Abnormal                           

    



             15071-6)                                            



[facility]NA, K, CL, CO2, 
GLUCOSE, BUN, CREATININE, CA)2022 11:13:09





             Test Item    Value        Reference Range Interpretation Comments

 

             NA (test code = 137 mmol/L   135-145                   



             1863734371)                                         

 

             K (test code = 4.8 mmol/L   3.5-5.0                   



             9759273098)                                         

 

             CL (test code = 101 mmol/L                       



             4406392715)                                         

 

             CO2 TOTAL (test code = 24 mmol/L    23-31                     



             7437705856)                                         

 

             AGAP (test code =              2-16                      



             1975370813)                                         

 

             BUN (test code = 17 mg/dL     7-23                      



             8150167082)                                         

 

             GLUCOSE (test code = 323 mg/dL           H            



             7519302346)                                         

 

             CREATININE (test code = 0.56 mg/dL   0.60-1.25    L            



             0469131084)                                         

 

             CALCIUM (test code = 9.6 mg/dL    8.6-10.6                  



             2946421604)                                         

 

             eGFR (test code =              mL/min/1.73m2              



             7540616645)                                         

 

             MAL (test code = MAL) Association of                           



                          Glomerular Filtration                           



                          Rate (GFR) and Staging                           



                          of Kidney Disease*                           



                          +---------------------                           



                          --+-------------------                           



                          --+-------------------                           



                          ------+| GFR                           



                          (mL/min/1.73 m2) ?|                           



                          With Kidney Damage ?|                           



                          ?Without Kidney                           



                          Damage+---------------                           



                          --------+-------------                           



                          --------+-------------                           



                          ------------+| ?>90 ?                           



                          ? ? ? ? ? ? ? ?|                           



                          ?Stage one ? ? ? ? ?|                           



                          ? Normal ? ? ? ? ? ? ?                           



                          ?+--------------------                           



                          ---+------------------                           



                          ---+------------------                           



                          -------+| ?60-89 ? ? ?                           



                          ? ? ? ? ?| ?Stage two                           



                          ? ? ? ? ?| ? Decreased                           



                          GFR ? ? ? ?                            



                          +---------------------                           



                          --+-------------------                           



                          --+-------------------                           



                          ------+| ?30-59 ? ? ?                           



                          ? ? ? ? ?| ?Stage                           



                          three ? ? ? ?| ? Stage                           



                          three ? ? ? ? ?                           



                          +---------------------                           



                          --+-------------------                           



                          --+-------------------                           



                          ------+| ?15-29 ? ? ?                           



                          ? ? ? ? ?| ?Stage four                           



                          ? ? ? ? | ? Stage four                           



                          ? ? ? ? ?                              



                          ?+--------------------                           



                          ---+------------------                           



                          ---+------------------                           



                          -------+| ?<15 (or                           



                          dialysis) ? ?| ?Stage                           



                          five ? ? ? ? | ? Stage                           



                          five ? ? ? ? ?                           



                          ?+--------------------                           



                          ---+------------------                           



                          ---+------------------                           



                          -------+ *Each stage                           



                          assumes the associated                           



                          GFR level has been in                           



                          effect for at least                           



                          three months. ?Stages                           



                          1 to 5, with or                           



                          without kidney                           



                          disease, indicate                           



                          chronic kidney                           



                          disease. Notes:                           



                          Determination of                           



                          stages one and two                           



                          (with eGFR                             



                          >59mL/min/1.73 m2)                           



                          requires estimation of                           



                          kidney damage for at                           



                          least three months as                           



                          defined by structural                           



                          or functional                           



                          abnormalities of the                           



                          kidney, manifested by                           



                          either:Pathological                           



                          abnormalities or                           



                          Markers of kidney                           



                          damage (including                           



                          abnormalities in the                           



                          composition of the                           



                          blood or urine or                           



                          abnormalities in                           



                          imaging tests).                           

 

             Lab Interpretation Abnormal                               



             (test code = 28003-0)                                        



[facility] WITH [id number] 10:49:07





             Test Item    Value        Reference Range Interpretation Comments

 

             WBC (test code =              See_Comment  H             [Automated



             6690-2)                                             message] The sy

stem



                                                                 which generated



                                                                 this result



                                                                 transmitted



                                                                 reference range

:



                                                                 4.20 - 10.70



                                                                 10*3/?L. The



                                                                 reference range

 was



                                                                 not used to



                                                                 interpret this



                                                                 result as



                                                                 normal/abnormal

.

 

             RBC (test code =              See_Comment                [Automated



             789-8)                                              message] The sy

stem



                                                                 which generated



                                                                 this result



                                                                 transmitted



                                                                 reference range

:



                                                                 4.26 - 5.52



                                                                 10*6/?L. The



                                                                 reference range

 was



                                                                 not used to



                                                                 interpret this



                                                                 result as



                                                                 normal/abnormal

.

 

             HGB (test code = 15.6 g/dL    12.2-16.4                 



             718-7)                                              

 

             HCT (test code = 46.6 %       38.4-49.3                 



             4544-3)                                             

 

             MCV (test code = 88.3 fL      81.7-95.6                 



             787-2)                                              

 

             MCH (test code = 29.5 pg      26.1-32.7                 



             785-6)                                              

 

             MCHC (test code = 33.5 g/dL    31.2-35.0                 



             786-4)                                              

 

             RDW-SD (test code = 46.7 fL      38.5-51.6                 



             42604-2)                                            

 

             RDW-CV (test code = 14.5 %       12.1-15.4                 



             788-0)                                              

 

             PLT (test code =              See_Comment  H             [Automated



             777-3)                                              message] The sy

stem



                                                                 which generated



                                                                 this result



                                                                 transmitted



                                                                 reference range

:



                                                                 150 - 328 10*3/

?L.



                                                                 The reference r

zhen



                                                                 was not used to



                                                                 interpret this



                                                                 result as



                                                                 normal/abnormal

.

 

             MPV (test code = 10.2 fL      9.8-13.0                  



             67885-6)                                            

 

             NRBC/100 WBC (test              See_Comment                [Automat

ed



             code = 0346820361)                                        message] 

The system



                                                                 which generated



                                                                 this result



                                                                 transmitted



                                                                 reference range

:



                                                                 0.0 - 10.0 /100



                                                                 WBCs. The refer

ence



                                                                 range was not u

sed



                                                                 to interpret th

is



                                                                 result as



                                                                 normal/abnormal

.

 

             NRBC x10^3 (test code <0.01        See_Comment                [Auto

mated



             = [id number])                                        message] The s

ystem



                                                                 which generated



                                                                 this result



                                                                 transmitted



                                                                 reference range

:



                                                                 10*3/?L. The



                                                                 reference range

 was



                                                                 not used to



                                                                 interpret this



                                                                 result as



                                                                 normal/abnormal

.

 

             GRAN MAT (NEUT) % 70.2 %                                 



             (test code = 770-8)                                        

 

             IMM GRAN % (test code 0.80 %                                 



             = [id number])                                        

 

             LYMPH % (test code = 18.0 %                                 



             736-9)                                              

 

             MONO % (test code = 8.5 %                                  



             5905-5)                                             

 

             EOS % (test code = 2.1 %                                  



             713-8)                                              

 

             BASO % (test code = 0.4 %                                  



             706-2)                                              

 

             GRAN MAT x10^3(ANC) 7.63 10*3/uL 1.99-6.95    H            



             (test code =                                        



             6109413523)                                         

 

             IMM GRAN x10^3 (test 0.09 10*3/uL 0.00-0.06    H            



             code = 2252625647)                                        

 

             LYMPH x10^3 (test code 1.96 10*3/uL 1.09-3.23                 



             = 731-0)                                            

 

             MONO x10^3 (test code 0.92 10*3/uL 0.36-1.02                 



             = 742-7)                                            

 

             EOS x10^3 (test code = 0.23 10*3/uL 0.06-0.53                 



             711-2)                                              

 

             BASO x10^3 (test code 0.04 10*3/uL 0.01-0.09                 



             = 704-7)                                            

 

             Lab Interpretation Abnormal                               



             (test code = 86327-8)                                        



[facility] (NA, K, CL, CO2, 
GLUCOSE, BUN, CREATININE, CA)2022 09:48:58





             Test Item    Value        Reference Range Interpretation Comments

 

             NA (test code = 135 mmol/L   135-145                   



             [id number])                                         

 

             K (test code = 4.7 mmol/L   3.5-5.0                   



             6975008156)                                         

 

             CL (test code = 101 mmol/L                       



             6954479642)                                         

 

             CO2 TOTAL (test code = 23 mmol/L    23-31                     



             9104414804)                                         

 

             AGAP (test code =              2-16                      



             0749197136)                                         

 

             BUN (test code = 18 mg/dL     7-23                      



             2079295284)                                         

 

             GLUCOSE (test code = 346 mg/dL           H            



             7752301709)                                         

 

             CREATININE (test code = 0.64 mg/dL   0.60-1.25                 



             9279131224)                                         

 

             CALCIUM (test code = 9.1 mg/dL    8.6-10.6                  



             0235219304)                                         

 

             eGFR (test code =              mL/min/1.73m2              



             4958756851)                                         

 

             MAL (test code = MAL) Association of                           



                          Glomerular Filtration                           



                          Rate (GFR) and Staging                           



                          of Kidney Disease*                           



                          +---------------------                           



                          --+-------------------                           



                          --+-------------------                           



                          ------+| GFR                           



                          (mL/min/1.73 m2) ?|                           



                          With Kidney Damage ?|                           



                          ?Without Kidney                           



                          Damage+---------------                           



                          --------+-------------                           



                          --------+-------------                           



                          ------------+| ?>90 ?                           



                          ? ? ? ? ? ? ? ?|                           



                          ?Stage one ? ? ? ? ?|                           



                          ? Normal ? ? ? ? ? ? ?                           



                          ?+--------------------                           



                          ---+------------------                           



                          ---+------------------                           



                          -------+| ?60-89 ? ? ?                           



                          ? ? ? ? ?| ?Stage two                           



                          ? ? ? ? ?| ? Decreased                           



                          GFR ? ? ? ?                            



                          +---------------------                           



                          --+-------------------                           



                          --+-------------------                           



                          ------+| ?30-59 ? ? ?                           



                          ? ? ? ? ?| ?Stage                           



                          three ? ? ? ?| ? Stage                           



                          three ? ? ? ? ?                           



                          +---------------------                           



                          --+-------------------                           



                          --+-------------------                           



                          ------+| ?15-29 ? ? ?                           



                          ? ? ? ? ?| ?Stage four                           



                          ? ? ? ? | ? Stage four                           



                          ? ? ? ? ?                              



                          ?+--------------------                           



                          ---+------------------                           



                          ---+------------------                           



                          -------+| ?<15 (or                           



                          dialysis) ? ?| ?Stage                           



                          five ? ? ? ? | ? Stage                           



                          five ? ? ? ? ?                           



                          ?+--------------------                           



                          ---+------------------                           



                          ---+------------------                           



                          -------+ *Each stage                           



                          assumes the associated                           



                          GFR level has been in                           



                          effect for at least                           



                          three months. ?Stages                           



                          1 to 5, with or                           



                          without kidney                           



                          disease, indicate                           



                          chronic kidney                           



                          disease. Notes:                           



                          Determination of                           



                          stages one and two                           



                          (with eGFR                             



                          >59mL/min/1.73 m2)                           



                          requires estimation of                           



                          kidney damage for at                           



                          least three months as                           



                          defined by structural                           



                          or functional                           



                          abnormalities of the                           



                          kidney, manifested by                           



                          either:Pathological                           



                          abnormalities or                           



                          Markers of kidney                           



                          damage (including                           



                          abnormalities in the                           



                          composition of the                           



                          blood or urine or                           



                          abnormalities in                           



                          imaging tests).                           

 

             Lab Interpretation Abnormal                               



             (test code = 11766-4)                                        



[facility] WITH [id number] 09:06:55





             Test Item    Value        Reference Range Interpretation Comments

 

             WBC (test code =              See_Comment                [Automated



             2357-2)                                             message] The sy

stem



                                                                 which generated



                                                                 this result



                                                                 transmitted



                                                                 reference range

:



                                                                 4.20 - 10.70



                                                                 10*3/?L. The



                                                                 reference range

 was



                                                                 not used to



                                                                 interpret this



                                                                 result as



                                                                 normal/abnormal

.

 

             RBC (test code =              See_Comment                [Automated



             903-4)                                              message] The sy

stem



                                                                 which generated



                                                                 this result



                                                                 transmitted



                                                                 reference range

:



                                                                 4.26 - 5.52



                                                                 10*6/?L. The



                                                                 reference range

 was



                                                                 not used to



                                                                 interpret this



                                                                 result as



                                                                 normal/abnormal

.

 

             HGB (test code = 15.0 g/dL    12.2-16.4                 



             718-7)                                              

 

             HCT (test code = 44.8 %       38.4-49.3                 



             4544-3)                                             

 

             MCV (test code = 88.2 fL      81.7-95.6                 



             787-2)                                              

 

             MCH (test code = 29.5 pg      26.1-32.7                 



             785-6)                                              

 

             MCHC (test code = 33.5 g/dL    31.2-35.0                 



             786-4)                                              

 

             RDW-SD (test code = 47.7 fL      38.5-51.6                 



             95179-8)                                            

 

             RDW-CV (test code = 14.6 %       12.1-15.4                 



             788-0)                                              

 

             PLT (test code =              See_Comment                [Automated



             777-3)                                              message] The sy

stem



                                                                 which generated



                                                                 this result



                                                                 transmitted



                                                                 reference range

:



                                                                 150 - 328 10*3/

?L.



                                                                 The reference r

zhen



                                                                 was not used to



                                                                 interpret this



                                                                 result as



                                                                 normal/abnormal

.

 

             MPV (test code = 9.9 fL       9.8-13.0                  



             92244-4)                                            

 

             NRBC/100 WBC (test              See_Comment                [Automat

ed



             code = 7577948767)                                        message] 

The system



                                                                 which generated



                                                                 this result



                                                                 transmitted



                                                                 reference range

:



                                                                 0.0 - 10.0 /100



                                                                 WBCs. The refer

ence



                                                                 range was not u

sed



                                                                 to interpret th

is



                                                                 result as



                                                                 normal/abnormal

.

 

             NRBC x10^3 (test code <0.01        See_Comment                [Auto

mated



             = 4348614713)                                        message] The s

ystem



                                                                 which generated



                                                                 this result



                                                                 transmitted



                                                                 reference range

:



                                                                 10*3/?L. The



                                                                 reference range

 was



                                                                 not used to



                                                                 interpret this



                                                                 result as



                                                                 normal/abnormal

.

 

             GRAN MAT (NEUT) % 62.6 %                                 



             (test code = 770-8)                                        

 

             IMM GRAN % (test code 1.30 %                                 



             = 1954672655)                                        

 

             LYMPH % (test code = 23.2 %                                 



             736-9)                                              

 

             MONO % (test code = 9.6 %                                  



             5905-5)                                             

 

             EOS % (test code = 2.9 %                                  



             713-8)                                              

 

             BASO % (test code = 0.4 %                                  



             706-2)                                              

 

             GRAN MAT x10^3(ANC) 5.85 10*3/uL 1.99-6.95                 



             (test code =                                        



             9627446107)                                         

 

             IMM GRAN x10^3 (test 0.12 10*3/uL 0.00-0.06    H            



             code = 1483674225)                                        

 

             LYMPH x10^3 (test code 2.17 10*3/uL 1.09-3.23                 



             = 731-0)                                            

 

             MONO x10^3 (test code 0.90 10*3/uL 0.36-1.02                 



             = 742-7)                                            

 

             EOS x10^3 (test code = 0.27 10*3/uL 0.06-0.53                 



             711-2)                                              

 

             BASO x10^3 (test code 0.04 10*3/uL 0.01-0.09                 



             = 704-7)                                            

 

             Lab Interpretation Abnormal                               



             (test code = 95220-1)                                        



[facility] CULTURE [id number] 02:01:46





             Test Item    Value        Reference Range Interpretation Comments

 

             Blood Culture-Aerobic No organisms No growth                 Previo

us



             (test code = 17928-3) isolated                               prelim

inary



                                                                 verified result



                                                                 was Culture In



                                                                 Progress on



                                                                 2022 at 00

01



                                                                 CDTPrevious



                                                                 preliminary



                                                                 verified result



                                                                 was No growth a

t



                                                                 24 hours on



                                                                 2022 at 21

01



                                                                 CDTPrevious



                                                                 preliminary



                                                                 verified result



                                                                 was No growth a

t



                                                                 48 hours on



                                                                 2022 at 21

01



                                                                 CDTPrevious



                                                                 preliminary



                                                                 verified result



                                                                 was No growth a

t



                                                                 72 hours on



                                                                 2022 at 21

01



                                                                 CDT

 

             Blood        No organisms No growth                 Previous



             Culture-Anaerobic isolated                               preliminar

y



             (test code = 14358-3)                                        verifi

ed result



                                                                 was Culture In



                                                                 Progress on



                                                                 2022 at 00

01



                                                                 CDTPrevious



                                                                 preliminary



                                                                 verified result



                                                                 was No growth a

t



                                                                 24 hours on



                                                                 2022 at 21

01



                                                                 CDTPrevious



                                                                 preliminary



                                                                 verified result



                                                                 was No growth a

t



                                                                 48 hours on



                                                                 2022 at 21

01



                                                                 CDTPrevious



                                                                 preliminary



                                                                 verified result



                                                                 was No growth a

t



                                                                 72 hours on



                                                                 2022 at 21

01



                                                                 CDT

 

             Lab Interpretation Normal                                 



             (test code = 91907-9)                                        



[facility] CULTURE [id number] 02:01:46





             Test Item    Value        Reference Range Interpretation Comments

 

             Blood Culture-Aerobic No organisms No growth                 Previo

us



             (test code = 17928-3) isolated                               prelim

inary



                                                                 verified result



                                                                 was Culture In



                                                                 Progress on



                                                                 2022 at 00

01



                                                                 CDTPrevious



                                                                 preliminary



                                                                 verified result



                                                                 was No growth a

t



                                                                 24 hours on



                                                                 2022 at 21

01



                                                                 CDTPrevious



                                                                 preliminary



                                                                 verified result



                                                                 was No growth a

t



                                                                 48 hours on



                                                                 2022 at 21

01



                                                                 CDTPrevious



                                                                 preliminary



                                                                 verified result



                                                                 was No growth a

t



                                                                 72 hours on



                                                                 2022 at 21

01



                                                                 CDT

 

             Blood        No organisms No growth                 Previous



             Culture-Anaerobic isolated                               preliminar

y



             (test code = 51420-8)                                        verifi

ed result



                                                                 was Culture In



                                                                 Progress on



                                                                 2022 at 00

01



                                                                 CDTPrevious



                                                                 preliminary



                                                                 verified result



                                                                 was No growth a

t



                                                                 24 hours on



                                                                 2022 at 21

01



                                                                 CDTPrevious



                                                                 preliminary



                                                                 verified result



                                                                 was No growth a

t



                                                                 48 hours on



                                                                 2022 at 21

01



                                                                 CDTPrevious



                                                                 preliminary



                                                                 verified result



                                                                 was No growth a

t



                                                                 72 hours on



                                                                 2022 at 21

01



                                                                 CDT

 

             Lab Interpretation Normal                                 



             (test code = 61062-7)                                        



[id number]





             Test Item    Value        Reference Range Interpretation Comments

 

             NT-proBNP (test code 117 pg/mL    See_Comment                [Autom

ated



             = [id number])                                        message] The



                                                                 system which



                                                                 generated this



                                                                 result



                                                                 transmitted



                                                                 reference range

:



                                                                 <=125. The



                                                                 reference range



                                                                 was not used to



                                                                 interpret this



                                                                 result as



                                                                 normal/abnormal

.

 

             MAL (test code = MAL) Biotin has been                           



                          reported to                            



                          cause a negative                           



                          bias, interpret                           



                          results relative                           



                          to patient's use                           



                          of biotin.                             

 

             Lab Interpretation Normal                                 



             (test code = 37532-5)                                        



[id number] 
12:10:25





             Test Item    Value        Reference Range Interpretation Comments

 

             NA (test code = 137 mmol/L   135-145                   



             [id number])                                         

 

             K (test code = 4.6 mmol/L   3.5-5.0                   



             8303443510)                                         

 

             CL (test code = 108 mmol/L                       



             6168877075)                                         

 

             CO2 TOTAL (test code = 20 mmol/L    23-31        L            



             8071703029)                                         

 

             AGAP (test code =              2-16                      



             3648690005)                                         

 

             BUN (test code = 12 mg/dL     7-23                      



             9217464148)                                         

 

             GLUCOSE (test code = 186 mg/dL           H            



             4608788716)                                         

 

             CREATININE (test code = 0.49 mg/dL   0.60-1.25    L            



             8390802501)                                         

 

             TOTAL BILI (test code = 0.7 mg/dL    0.1-1.1                   



             1467353646)                                         

 

             CALCIUM (test code = 8.7 mg/dL    8.6-10.6                  



             2934399283)                                         

 

             T PROTEIN (test code = 6.9 g/dL     6.3-8.2                   



             7677596585)                                         

 

             ALBUMIN (test code = 3.7 g/dL     3.5-5.0                   



             1197882705)                                         

 

             ALK PHOS (test code = 114 U/L                          



             8651983316)                                         

 

             ALTv (test code = 75 U/L       5-50         H            



             1742-6)                                             

 

             AST(SGOT) (test code = 27 U/L       13-40                     



             3366451790)                                         

 

             eGFR (test code =              mL/min/1.73m2              



             4405843141)                                         

 

             MAL (test code = MAL) Association of                           



                          Glomerular Filtration                           



                          Rate (GFR) and Staging                           



                          of Kidney Disease*                           



                          +---------------------                           



                          --+-------------------                           



                          --+-------------------                           



                          ------+| GFR                           



                          (mL/min/1.73 m2) ?|                           



                          With Kidney Damage ?|                           



                          ?Without Kidney                           



                          Damage+---------------                           



                          --------+-------------                           



                          --------+-------------                           



                          ------------+| ?>90 ?                           



                          ? ? ? ? ? ? ? ?|                           



                          ?Stage one ? ? ? ? ?|                           



                          ? Normal ? ? ? ? ? ? ?                           



                          ?+--------------------                           



                          ---+------------------                           



                          ---+------------------                           



                          -------+| ?60-89 ? ? ?                           



                          ? ? ? ? ?| ?Stage two                           



                          ? ? ? ? ?| ? Decreased                           



                          GFR ? ? ? ?                            



                          +---------------------                           



                          --+-------------------                           



                          --+-------------------                           



                          ------+| ?30-59 ? ? ?                           



                          ? ? ? ? ?| ?Stage                           



                          three ? ? ? ?| ? Stage                           



                          three ? ? ? ? ?                           



                          +---------------------                           



                          --+-------------------                           



                          --+-------------------                           



                          ------+| ?15-29 ? ? ?                           



                          ? ? ? ? ?| ?Stage four                           



                          ? ? ? ? | ? Stage four                           



                          ? ? ? ? ?                              



                          ?+--------------------                           



                          ---+------------------                           



                          ---+------------------                           



                          -------+| ?<15 (or                           



                          dialysis) ? ?| ?Stage                           



                          five ? ? ? ? | ? Stage                           



                          five ? ? ? ? ?                           



                          ?+--------------------                           



                          ---+------------------                           



                          ---+------------------                           



                          -------+ *Each stage                           



                          assumes the associated                           



                          GFR level has been in                           



                          effect for at least                           



                          three months. ?Stages                           



                          1 to 5, with or                           



                          without kidney                           



                          disease, indicate                           



                          chronic kidney                           



                          disease. Notes:                           



                          Determination of                           



                          stages one and two                           



                          (with eGFR                             



                          >59mL/min/1.73 m2)                           



                          requires estimation of                           



                          kidney damage for at                           



                          least three months as                           



                          defined by structural                           



                          or functional                           



                          abnormalities of the                           



                          kidney, manifested by                           



                          either:Pathological                           



                          abnormalities or                           



                          Markers of kidney                           



                          damage (including                           



                          abnormalities in the                           



                          composition of the                           



                          blood or urine or                           



                          abnormalities in                           



                          imaging tests).                           

 

             Lab Interpretation Abnormal                               



             (test code = 95662-1)                                        



[facility] WITH [id number] 11:14:25





             Test Item    Value        Reference Range Interpretation Comments

 

             WBC (test code =              See_Comment                [Automated



             2191-2)                                             message] The sy

stem



                                                                 which generated



                                                                 this result



                                                                 transmitted



                                                                 reference range

:



                                                                 4.20 - 10.70



                                                                 10*3/?L. The



                                                                 reference range

 was



                                                                 not used to



                                                                 interpret this



                                                                 result as



                                                                 normal/abnormal

.

 

             RBC (test code =              See_Comment                [Automated



             789-8)                                              message] The sy

stem



                                                                 which generated



                                                                 this result



                                                                 transmitted



                                                                 reference range

:



                                                                 4.26 - 5.52



                                                                 10*6/?L. The



                                                                 reference range

 was



                                                                 not used to



                                                                 interpret this



                                                                 result as



                                                                 normal/abnormal

.

 

             HGB (test code = 14.8 g/dL    12.2-16.4                 



             718-7)                                              

 

             HCT (test code = 44.8 %       38.4-49.3                 



             4544-3)                                             

 

             MCV (test code = 89.1 fL      81.7-95.6                 



             787-2)                                              

 

             MCH (test code = 29.4 pg      26.1-32.7                 



             785-6)                                              

 

             MCHC (test code = 33.0 g/dL    31.2-35.0                 



             786-4)                                              

 

             RDW-SD (test code = 48.7 fL      38.5-51.6                 



             30134-2)                                            

 

             RDW-CV (test code = 15.0 %       12.1-15.4                 



             788-0)                                              

 

             PLT (test code =              See_Comment                [Automated



             777-3)                                              message] The sy

stem



                                                                 which generated



                                                                 this result



                                                                 transmitted



                                                                 reference range

:



                                                                 150 - 328 10*3/

?L.



                                                                 The reference r

zhen



                                                                 was not used to



                                                                 interpret this



                                                                 result as



                                                                 normal/abnormal

.

 

             MPV (test code = 10.4 fL      9.8-13.0                  



             17775-4)                                            

 

             NRBC/100 WBC (test              See_Comment                [Automat

ed



             code = 0639766532)                                        message] 

The system



                                                                 which generated



                                                                 this result



                                                                 transmitted



                                                                 reference range

:



                                                                 0.0 - 10.0 /100



                                                                 WBCs. The refer

ence



                                                                 range was not u

sed



                                                                 to interpret th

is



                                                                 result as



                                                                 normal/abnormal

.

 

             NRBC x10^3 (test code <0.01        See_Comment                [Auto

mated



             = 1740150420)                                        message] The s

ystem



                                                                 which generated



                                                                 this result



                                                                 transmitted



                                                                 reference range

:



                                                                 10*3/?L. The



                                                                 reference range

 was



                                                                 not used to



                                                                 interpret this



                                                                 result as



                                                                 normal/abnormal

.

 

             GRAN MAT (NEUT) % 65.5 %                                 



             (test code = 770-8)                                        

 

             IMM GRAN % (test code 0.80 %                                 



             = 2446334136)                                        

 

             LYMPH % (test code = 20.9 %                                 



             736-9)                                              

 

             MONO % (test code = 9.7 %                                  



             5905-5)                                             

 

             EOS % (test code = 2.7 %                                  



             713-8)                                              

 

             BASO % (test code = 0.4 %                                  



             706-2)                                              

 

             GRAN MAT x10^3(ANC) 5.41 10*3/uL 1.99-6.95                 



             (test code =                                        



             6178238064)                                         

 

             IMM GRAN x10^3 (test 0.07 10*3/uL 0.00-0.06    H            



             code = 3020964550)                                        

 

             LYMPH x10^3 (test code 1.73 10*3/uL 1.09-3.23                 



             = 731-0)                                            

 

             MONO x10^3 (test code 0.80 10*3/uL 0.36-1.02                 



             = 742-7)                                            

 

             EOS x10^3 (test code = 0.22 10*3/uL 0.06-0.53                 



             711-2)                                              

 

             BASO x10^3 (test code 0.03 10*3/uL 0.01-0.09                 



             = 704-7)                                            

 

             Lab Interpretation Abnormal                               



             (test code = 63897-0)                                        



[id number] 13:16:44





             Test Item    Value        Reference Range Interpretation Comments

 

             NT-proBNP (test code 157 pg/mL    See_Comment  H             [Autom

ated



             = 6380673848)                                        message] The



                                                                 system which



                                                                 generated this



                                                                 result



                                                                 transmitted



                                                                 reference range

:



                                                                 <=125. The



                                                                 reference range



                                                                 was not used to



                                                                 interpret this



                                                                 result as



                                                                 normal/abnormal

.

 

             MAL (test code = MAL) Biotin has been                           



                          reported to                            



                          cause a negative                           



                          bias, interpret                           



                          results relative                           



                          to patient's use                           



                          of biotin.                             

 

             Lab Interpretation Abnormal                               



             (test code = 70129-7)                                        



[id number] 
13:08:45





             Test Item    Value        Reference Range Interpretation Comments

 

             NA (test code = 141 mmol/L   135-145                   



             2831605293)                                         

 

             K (test code = 4.2 mmol/L   3.5-5.0                   



             7555236687)                                         

 

             CL (test code = 110 mmol/L          H            



             2384221083)                                         

 

             CO2 TOTAL (test code = 24 mmol/L    23-31                     



             5721714442)                                         

 

             AGAP (test code =              2-16                      



             5147750031)                                         

 

             BUN (test code = 11 mg/dL     7-23                      



             8952054411)                                         

 

             GLUCOSE (test code = 142 mg/dL           H            



             8185979954)                                         

 

             CREATININE (test code = 0.61 mg/dL   0.60-1.25                 



             9574525308)                                         

 

             TOTAL BILI (test code = 0.7 mg/dL    0.1-1.1                   



             3041622209)                                         

 

             CALCIUM (test code = 8.5 mg/dL    8.6-10.6     L            



             6387723512)                                         

 

             T PROTEIN (test code = 6.7 g/dL     6.3-8.2                   



             9396119540)                                         

 

             ALBUMIN (test code = 3.6 g/dL     3.5-5.0                   



             6377420827)                                         

 

             ALK PHOS (test code = 120 U/L                          



             2789513476)                                         

 

             ALTv (test code = 88 U/L       5-50         H            



             1742-6)                                             

 

             AST(SGOT) (test code = 27 U/L       13-40                     



             5203598034)                                         

 

             eGFR (test code =              mL/min/1.73m2              



             4311494962)                                         

 

             MAL (test code = MAL) Association of                           



                          Glomerular Filtration                           



                          Rate (GFR) and Staging                           



                          of Kidney Disease*                           



                          +---------------------                           



                          --+-------------------                           



                          --+-------------------                           



                          ------+| GFR                           



                          (mL/min/1.73 m2) ?|                           



                          With Kidney Damage ?|                           



                          ?Without Kidney                           



                          Damage+---------------                           



                          --------+-------------                           



                          --------+-------------                           



                          ------------+| ?>90 ?                           



                          ? ? ? ? ? ? ? ?|                           



                          ?Stage one ? ? ? ? ?|                           



                          ? Normal ? ? ? ? ? ? ?                           



                          ?+--------------------                           



                          ---+------------------                           



                          ---+------------------                           



                          -------+| ?60-89 ? ? ?                           



                          ? ? ? ? ?| ?Stage two                           



                          ? ? ? ? ?| ? Decreased                           



                          GFR ? ? ? ?                            



                          +---------------------                           



                          --+-------------------                           



                          --+-------------------                           



                          ------+| ?30-59 ? ? ?                           



                          ? ? ? ? ?| ?Stage                           



                          three ? ? ? ?| ? Stage                           



                          three ? ? ? ? ?                           



                          +---------------------                           



                          --+-------------------                           



                          --+-------------------                           



                          ------+| ?15-29 ? ? ?                           



                          ? ? ? ? ?| ?Stage four                           



                          ? ? ? ? | ? Stage four                           



                          ? ? ? ? ?                              



                          ?+--------------------                           



                          ---+------------------                           



                          ---+------------------                           



                          -------+| ?<15 (or                           



                          dialysis) ? ?| ?Stage                           



                          five ? ? ? ? | ? Stage                           



                          five ? ? ? ? ?                           



                          ?+--------------------                           



                          ---+------------------                           



                          ---+------------------                           



                          -------+ *Each stage                           



                          assumes the associated                           



                          GFR level has been in                           



                          effect for at least                           



                          three months. ?Stages                           



                          1 to 5, with or                           



                          without kidney                           



                          disease, indicate                           



                          chronic kidney                           



                          disease. Notes:                           



                          Determination of                           



                          stages one and two                           



                          (with eGFR                             



                          >59mL/min/1.73 m2)                           



                          requires estimation of                           



                          kidney damage for at                           



                          least three months as                           



                          defined by structural                           



                          or functional                           



                          abnormalities of the                           



                          kidney, manifested by                           



                          either:Pathological                           



                          abnormalities or                           



                          Markers of kidney                           



                          damage (including                           



                          abnormalities in the                           



                          composition of the                           



                          blood or urine or                           



                          abnormalities in                           



                          imaging tests).                           

 

             Lab Interpretation Abnormal                               



             (test code = 82270-0)                                        



[facility]





             Test Item    Value        Reference Range Interpretation Comments

 

             MAGNESIUM (test code = 1204311873) 1.7 mg/dL    1.7-2.4            

       

 

             Lab Interpretation (test code = Normal                             

    



             80854-2)                                            



[facility] WITH [id number] 11:57:56





             Test Item    Value        Reference Range Interpretation Comments

 

             WBC (test code =              See_Comment                [Automated



             6690-2)                                             message] The sy

stem



                                                                 which generated



                                                                 this result



                                                                 transmitted



                                                                 reference range

:



                                                                 4.20 - 10.70



                                                                 10*3/?L. The



                                                                 reference range

 was



                                                                 not used to



                                                                 interpret this



                                                                 result as



                                                                 normal/abnormal

.

 

             RBC (test code =              See_Comment                [Automated



             789-8)                                              message] The sy

stem



                                                                 which generated



                                                                 this result



                                                                 transmitted



                                                                 reference range

:



                                                                 4.26 - 5.52



                                                                 10*6/?L. The



                                                                 reference range

 was



                                                                 not used to



                                                                 interpret this



                                                                 result as



                                                                 normal/abnormal

.

 

             HGB (test code = 14.6 g/dL    12.2-16.4                 



             718-7)                                              

 

             HCT (test code = 43.5 %       38.4-49.3                 



             4544-3)                                             

 

             MCV (test code = 88.4 fL      81.7-95.6                 



             787-2)                                              

 

             MCH (test code = 29.7 pg      26.1-32.7                 



             785-6)                                              

 

             MCHC (test code = 33.6 g/dL    31.2-35.0                 



             786-4)                                              

 

             RDW-SD (test code = 48.9 fL      38.5-51.6                 



             81657-8)                                            

 

             RDW-CV (test code = 14.9 %       12.1-15.4                 



             788-0)                                              

 

             PLT (test code =              See_Comment                [Automated



             777-3)                                              message] The sy

stem



                                                                 which generated



                                                                 this result



                                                                 transmitted



                                                                 reference range

:



                                                                 150 - 328 10*3/

?L.



                                                                 The reference r

zhen



                                                                 was not used to



                                                                 interpret this



                                                                 result as



                                                                 normal/abnormal

.

 

             MPV (test code = 9.4 fL       9.8-13.0     L            



             14180-1)                                            

 

             NRBC/100 WBC (test              See_Comment                [Automat

ed



             code = 9862555071)                                        message] 

The system



                                                                 which generated



                                                                 this result



                                                                 transmitted



                                                                 reference range

:



                                                                 0.0 - 10.0 /100



                                                                 WBCs. The refer

ence



                                                                 range was not u

sed



                                                                 to interpret th

is



                                                                 result as



                                                                 normal/abnormal

.

 

             NRBC x10^3 (test code <0.01        See_Comment                [Auto

mated



             = 6757504765)                                        message] The s

ystem



                                                                 which generated



                                                                 this result



                                                                 transmitted



                                                                 reference range

:



                                                                 10*3/?L. The



                                                                 reference range

 was



                                                                 not used to



                                                                 interpret this



                                                                 result as



                                                                 normal/abnormal

.

 

             GRAN MAT (NEUT) % 63.9 %                                 



             (test code = 770-8)                                        

 

             IMM GRAN % (test code 0.70 %                                 



             = 3188863582)                                        

 

             LYMPH % (test code = 22.7 %                                 



             736-9)                                              

 

             MONO % (test code = 9.6 %                                  



             5905-5)                                             

 

             EOS % (test code = 2.7 %                                  



             713-8)                                              

 

             BASO % (test code = 0.4 %                                  



             706-2)                                              

 

             GRAN MAT x10^3(ANC) 4.75 10*3/uL 1.99-6.95                 



             (test code =                                        



             6111922789)                                         

 

             IMM GRAN x10^3 (test 0.05 10*3/uL 0.00-0.06                 



             code = 2082117464)                                        

 

             LYMPH x10^3 (test code 1.69 10*3/uL 1.09-3.23                 



             = 731-0)                                            

 

             MONO x10^3 (test code 0.71 10*3/uL 0.36-1.02                 



             = 742-7)                                            

 

             EOS x10^3 (test code = 0.20 10*3/uL 0.06-0.53                 



             711-2)                                              

 

             BASO x10^3 (test code 0.03 10*3/uL 0.01-0.09                 



             = 704-7)                                            

 

             Lab Interpretation Abnormal                               



             (test code = 69409-1)                                        



[facility] [id number] 19:18:39





             Test Item    Value        Reference Range Interpretation Comments

 

             VIT B12 (test code = 249 pg/mL    240-930                   



             6220425465)                                         

 

             MAL (test code = MAL) Biotin has been                           



                          reported to cause a                           



                          positive bias,                           



                          interpret results                           



                          relative to                            



                          patient's use of                           



                          biotin.                                

 

             Lab Interpretation (test Normal                                 



             code = 48521-1)                                        



[facility] [id number] 19:18:39





             Test Item    Value        Reference Range Interpretation Comments

 

             VIT B12 (test code = 249 pg/mL    240-930                   



             0873498869)                                         

 

             MAL (test code = MAL) Biotin has been                           



                          reported to cause a                           



                          positive bias,                           



                          interpret results                           



                          relative to                            



                          patient's use of                           



                          biotin.                                

 

             Lab Interpretation (test Normal                                 



             code = 56534-2)                                        



[facility] [id number] 17:30:28





             Test Item    Value        Reference Range Interpretation Comments

 

             VIT D 25OH (test code = 16 ng/mL     25-80        L            



             41573-6)                                            

 

             MAL (test code = MAL) Deficiency: <20                           



                          ng/mLInsufficiency:                           



                          20-24 ng/mLOptimal:                           



                          25-80 ng/mL                            

 

             Lab Interpretation (test Abnormal                               



             code = 13728-8)                                        



[facility] [id number] 17:30:28





             Test Item    Value        Reference Range Interpretation Comments

 

             VIT D 25OH (test code = 16 ng/mL     25-80        L            



             20952-2)                                            

 

             MAL (test code = MAL) Deficiency: <20                           



                          ng/mLInsufficiency:                           



                          20-24 ng/mLOptimal:                           



                          25-80 ng/mL                            

 

             Lab Interpretation (test Abnormal                               



             code = 06258-9)                                        



[facility] 16:07:32





             Test Item    Value        Reference    Interpretation Comments



                                       Range                     

 

             Procalcitonin (test 0.04 ng/mL   See_Comment                [Automa

huma



             code = 4838854922)                                        message] 

The



                                                                 system which



                                                                 generated this



                                                                 result



                                                                 transmitted



                                                                 reference



                                                                 range: <=0.07.



                                                                 The reference



                                                                 range was not



                                                                 used to



                                                                 interpret this



                                                                 result as



                                                                 normal/abnormal



                                                                 .

 

             MAL (test code = INTERPRETATION OF                           



             MAL)         PROCALCITONIN RESULTS                           



                          IN ADULTS >= 18 YEARS                           



                          OF AGE Initiation and                           



                          discontinuation of                           



                          antibiotics on                           



                          patients with                           



                          suspected or confirmed                           



                          Lower Respiratory                           



                          Tract Infection in                           



                          Adults >= 18 years of                           



                          age.                                   



                          +--------------+------                           



                          ----------+-----------                           



                          ----+-----------------                           



                          -----------+|Procalcit                           



                          onin |Interpretation                           



                          ?|Antibiotic ? ?                           



                          |Considerations ? ? ?                           



                          ? ? ? ? |ng/mL ? ? ? ?                           



                          | ? ? ? ? ? ? ?                           



                          ?|recommendation | ? ?                           



                          ? ? ? ? ? ? ? ? ? ? ?                           



                          ?                                      



                          +--------------+------                           



                          ----------+-----------                           



                          ----+-----------------                           



                          -----------+| <0.1 ? ?                           



                          ? ? | Bacterial ? ? ?|                           



                          Strongly ? ? ?| ? ? ?                           



                          ? ? ? ? ? ? ? ? ? ? ?                           



                          | ? ? ? ? ? ? ?|                           



                          infection very |                           



                          discouraged ? |                           



                          Overruling: ? ? ? ? ?                           



                          ? ? ? | ? ? ? ? ? ? ?|                           



                          unlikely ? ? ? | ? ? ?                           



                          ? ? ? ? | ? Clinically                           



                          unstable ? ? ?                           



                          +--------------+------                           



                          ----------+-----------                           



                          ----+ ? High risk for                           



                          adverse ? ? | <0.25 ?                           



                          ? ? ?| Bacterial ? ?                           



                          ?| Discouraged ? | ?                           



                          outcome ? ? ? ? ? ? ?                           



                          ? ? | ? ? ? ? ? ? ?|                           



                          infection ? ? ?| ? ? ?                           



                          ? ? ? ? | ? SEE                           



                          IMPORTANT NOTE ? ? ?                           



                          ?| ? ? ? ? ? ? ?|                           



                          unlikely ? ? ? | ? ? ?                           



                          ? ? ? ? | ? ? ? ? ? ?                           



                          ? ? ? ? ? ? ? ?                           



                          +--------------+------                           



                          ----------+-----------                           



                          ----+-----------------                           



                          -----------+| >=0.25 ?                           



                          ? ? | Bacterial ? ? ?|                           



                          Encouraged ? ?| ? ? ?                           



                          ? ? ? ? ? ? ? ? ? ? ?                           



                          | ? ? ? ? ? ? ?|                           



                          infection ? ? ?| ? ? ?                           



                          ? ? ? ? | ? ? ? ? ? ?                           



                          ? ? ? ? ? ? ? ? | ? ?                           



                          ? ? ? ? ?| likely ? ?                           



                          ? ? | ? ? ? ? ? ? ? |                           



                          Consider treatment                           



                          failure                                



                          ?+--------------+-----                           



                          -----------+----------                           



                          -----+ if levels does                           



                          not decrease | >0.5 ?                           



                          ? ? ? | Bacterial ? ?                           



                          ?| Strongly ? ? ?|                           



                          appropriately ? ? ? ?                           



                          ? ? ? | ? ? ? ? ? ? ?|                           



                          infection very |                           



                          encouraged ? ?| ? ? ?                           



                          ? ? ? ? ? ? ? ? ? ? ?                           



                          | ? ? ? ? ? ? ?|                           



                          likely ? ? ? ? | ? ? ?                           



                          ? ? ? ? | ? ? ? ? ? ?                           



                          ? ? ? ? ? ? ? ?                           



                          +--------------+------                           



                          ----------+-----------                           



                          ----+-----------------                           



                          -----------+                           



                          Discontinuation of                           



                          antibiotics in                           



                          high-acuity patients                           



                          with suspected or                           



                          confirmed sepsis in                           



                          Adults >= 18 years of                           



                          age.                                   



                          +--------------+------                           



                          ----------+-----------                           



                          ----+-----------------                           



                          -----------+|Procalcit                           



                          onin |Interpretation                           



                          ?|Antibiotic ? ?                           



                          |Considerations ? ? ?                           



                          ? ? ? ? |ng/mL ? ? ? ?                           



                          | ? ? ? ? ? ? ?                           



                          ?|recommendation | ? ?                           



                          ? ? ? ? ? ? ? ? ? ? ?                           



                          ?                                      



                          +--------------+------                           



                          ----------+-----------                           



                          ----+-----------------                           



                          -----------+| <0.25 ?                           



                          ? ? ?| Bacterial ? ?                           



                          ?| Strongly ? ? ?| ? ?                           



                          ? ? ? ? ? ? ? ? ? ? ?                           



                          ? | ? ? ? ? ? ? ?|                           



                          infection very |                           



                          discouraged ? |                           



                          Overruling: ? ? ? ? ?                           



                          ? ? ? | ? ? ? ? ? ? ?|                           



                          unlikely ? ? ? | ? ? ?                           



                          ? ? ? ? | ? Clinically                           



                          unstable ? ? ?                           



                          +--------------+------                           



                          ----------+-----------                           



                          ----+ ? High risk for                           



                          adverse ? ? | <0.5 or                           



                          drop | Bacterial ? ?                           



                          ?| Discouraged ? | ?                           



                          outcome ? ? ? ? ? ? ?                           



                          ? ? | >80% from ? ?|                           



                          infection ? ? ?| ? ? ?                           



                          ? ? ? ? | ? SEE                           



                          IMPORTANT NOTE ? ? ?                           



                          ?| highest PCT ?|                           



                          unlikely ? ? ? | ? ? ?                           



                          ? ? ? ? | ? ? ? ? ? ?                           



                          ? ? ? ? ? ? ? ? |                           



                          level ? ? ? ?| ? ? ? ?                           



                          ? ? ? ?| ? ? ? ? ? ? ?                           



                          | ? ? ? ? ? ? ? ? ? ?                           



                          ? ? ? ?                                



                          +--------------+------                           



                          ----------+-----------                           



                          ----+-----------------                           



                          -----------+| >=0.5 ?                           



                          ? ? ?| Bacterial ? ?                           



                          ?| Encouraged ? ?| ? ?                           



                          ? ? ? ? ? ? ? ? ? ? ?                           



                          ? | ? ? ? ? ? ? ?|                           



                          infection ? ? ?| ? ? ?                           



                          ? ? ? ? | ? ? ? ? ? ?                           



                          ? ? ? ? ? ? ? ? | ? ?                           



                          ? ? ? ? ?| likely ? ?                           



                          ? ? | ? ? ? ? ? ? ? |                           



                          Consider treatment                           



                          failure                                



                          ?+--------------+-----                           



                          -----------+----------                           



                          -----+ if levels does                           



                          not decrease | >1.0 ?                           



                          ? ? ? | Bacterial ? ?                           



                          ?| Strongly ? ? ?|                           



                          appropriately ? ? ? ?                           



                          ? ? ? | ? ? ? ? ? ? ?|                           



                          infection very |                           



                          encouraged ? ?| ? ? ?                           



                          ? ? ? ? ? ? ? ? ? ? ?                           



                          | ? ? ? ? ? ? ?|                           



                          likely ? ? ? ? | ? ? ?                           



                          ? ? ? ? | ? ? ? ? ? ?                           



                          ? ? ? ? ? ? ? ?                           



                          +--------------+------                           



                          ----------+-----------                           



                          ----+-----------------                           



                          -----------+                           



                          Percentage of drop of                           



                          Procalcitonin                           



                          calculation for                           



                          Discontinuation of                           



                          antibiotics in                           



                          high-acuity patients                           



                          with suspected or                           



                          confirmed sepsis in                           



                          Adults >= 18 years of                           



                          age. ? ? ? ? ? ? ? ? ?                           



                          ? ? Procalcitonin                           



                          highest{}-Procalcitoni                           



                          n current{}Delta                           



                          Procalcitonin =                           



                          ______________________                           



                          ______________________                           



                          ___ x100% ? ? ? ? ? ?                           



                          ? ? ? ? ? ? ? ? ? ?                           



                          Procalcitonin current                           



                          {} IMPORTANT NOTE:                           



                          Procalcitonin may be                           



                          elevated without                           



                          bacterial infection by                           



                          physiologic stress                           



                          related to trauma,                           



                          burns, chronic                           



                          dialysis, metastatic                           



                          cancer, surgery in the                           



                          past seven days,                           



                          malaria, some fungal                           



                          infections, and some                           



                          forms of vasculitis.                           



                          The interpretation                           



                          algorithm may not                           



                          apply to patients with                           



                          immunosuppression                           



                          (equivalent of >10 mg                           



                          of prednisone daily),                           



                          HIV with CD4 cell                           



                          count < 350 cells/mm3,                           



                          active malignancy on                           



                          systemic chemotherapy,                           



                          solid organ transplant                           



                          or hematopoietic stem                           



                          cell transplantation,                           



                          or hospital acquired                           



                          pneumonia.                             



                          Additionally, some                           



                          clinical trials of                           



                          procalcitonin have                           



                          excluded patients with                           



                          shock requiring                           



                          vasopressor use, acute                           



                          respiratory failure                           



                          requiring mechanical                           



                          ventilation, or those                           



                          with known lung                           



                          abscess/empyema. For                           



                          further information                           



                          please refer                           



                          to:[email or web address]                           



                          [email or web address]                           



                          p                                      

 

             Lab Interpretation Normal                                 



             (test code =                                        



             12745-4)                                            



[id number]32





             Test Item    Value        Reference Range Interpretation Comments

 

             Procalcitonin (test 0.04 ng/mL   <0.07                     



             code = [id number])                                        

 

             MAL (test code = MAL) INTERPRETATION OF                           



                          PROCALCITONIN RESULTS IN                           



                          ADULTS >= 18 YEARS OF                           



                          AGE Initiation and                           



                          discontinuation of                           



                          antibiotics on patients                           



                          with suspected or                           



                          confirmed Lower                           



                          Respiratory Tract                           



                          Infection in Adults >=                           



                          18 years of age.                           



                          +--------------+--------                           



                          --------+---------------                           



                          +-----------------------                           



                          -----+|Procalcitonin                           



                          |Interpretation                           



                          ?|Antibiotic ? ?                           



                          |Considerations ? ? ? ?                           



                          ? ? ? |ng/mL ? ? ? ? | ?                           



                          ? ? ? ? ? ?                            



                          ?|recommendation | ? ? ?                           



                          ? ? ? ? ? ? ? ? ? ? ?                           



                          +--------------+--------                           



                          --------+---------------                           



                          +-----------------------                           



                          -----+| <0.1 ? ? ? ? |                           



                          Bacterial ? ? ?|                           



                          Strongly ? ? ?| ? ? ? ?                           



                          ? ? ? ? ? ? ? ? ? ? | ?                           



                          ? ? ? ? ? ?| infection                           



                          very | discouraged ? |                           



                          Overruling: ? ? ? ? ? ?                           



                          ? ? | ? ? ? ? ? ? ?|                           



                          unlikely ? ? ? | ? ? ? ?                           



                          ? ? ? | ? Clinically                           



                          unstable ? ? ?                           



                          +--------------+--------                           



                          --------+---------------                           



                          + ? High risk for                           



                          adverse ? ? | <0.25 ? ?                           



                          ? ?| Bacterial ? ? ?|                           



                          Discouraged ? | ?                           



                          outcome ? ? ? ? ? ? ? ?                           



                          ? | ? ? ? ? ? ? ?|                           



                          infection ? ? ?| ? ? ? ?                           



                          ? ? ? | ? SEE IMPORTANT                           



                          NOTE ? ? ? ?| ? ? ? ? ?                           



                          ? ?| unlikely ? ? ? | ?                           



                          ? ? ? ? ? ? | ? ? ? ? ?                           



                          ? ? ? ? ? ? ? ? ?                           



                          +--------------+--------                           



                          --------+---------------                           



                          +-----------------------                           



                          -----+| >=0.25 ? ? ? |                           



                          Bacterial ? ? ?|                           



                          Encouraged ? ?| ? ? ? ?                           



                          ? ? ? ? ? ? ? ? ? ? | ?                           



                          ? ? ? ? ? ?| infection ?                           



                          ? ?| ? ? ? ? ? ? ? | ? ?                           



                          ? ? ? ? ? ? ? ? ? ? ? ?                           



                          | ? ? ? ? ? ? ?| likely                           



                          ? ? ? ? | ? ? ? ? ? ? ?                           



                          | Consider treatment                           



                          failure                                



                          ?+--------------+-------                           



                          ---------+--------------                           



                          -+ if levels does not                           



                          decrease | >0.5 ? ? ? ?                           



                          | Bacterial ? ? ?|                           



                          Strongly ? ? ?|                           



                          appropriately ? ? ? ? ?                           



                          ? ? | ? ? ? ? ? ? ?|                           



                          infection very |                           



                          encouraged ? ?| ? ? ? ?                           



                          ? ? ? ? ? ? ? ? ? ? | ?                           



                          ? ? ? ? ? ?| likely ? ?                           



                          ? ? | ? ? ? ? ? ? ? | ?                           



                          ? ? ? ? ? ? ? ? ? ? ? ?                           



                          ?                                      



                          +--------------+--------                           



                          --------+---------------                           



                          +-----------------------                           



                          -----+ Discontinuation                           



                          of antibiotics in                           



                          high-acuity patients                           



                          with suspected or                           



                          confirmed sepsis in                           



                          Adults >= 18 years of                           



                          age.                                   



                          +--------------+--------                           



                          --------+---------------                           



                          +-----------------------                           



                          -----+|Procalcitonin                           



                          |Interpretation                           



                          ?|Antibiotic ? ?                           



                          |Considerations ? ? ? ?                           



                          ? ? ? |ng/mL ? ? ? ? | ?                           



                          ? ? ? ? ? ?                            



                          ?|recommendation | ? ? ?                           



                          ? ? ? ? ? ? ? ? ? ? ?                           



                          +--------------+--------                           



                          --------+---------------                           



                          +-----------------------                           



                          -----+| <0.25 ? ? ? ?|                           



                          Bacterial ? ? ?|                           



                          Strongly ? ? ?| ? ? ? ?                           



                          ? ? ? ? ? ? ? ? ? ? | ?                           



                          ? ? ? ? ? ?| infection                           



                          very | discouraged ? |                           



                          Overruling: ? ? ? ? ? ?                           



                          ? ? | ? ? ? ? ? ? ?|                           



                          unlikely ? ? ? | ? ? ? ?                           



                          ? ? ? | ? Clinically                           



                          unstable ? ? ?                           



                          +--------------+--------                           



                          --------+---------------                           



                          + ? High risk for                           



                          adverse ? ? | <0.5 or                           



                          drop | Bacterial ? ? ?|                           



                          Discouraged ? | ?                           



                          outcome ? ? ? ? ? ? ? ?                           



                          ? | >80% from ? ?|                           



                          infection ? ? ?| ? ? ? ?                           



                          ? ? ? | ? SEE IMPORTANT                           



                          NOTE ? ? ? ?| highest                           



                          PCT ?| unlikely ? ? ? |                           



                          ? ? ? ? ? ? ? | ? ? ? ?                           



                          ? ? ? ? ? ? ? ? ? ? |                           



                          level ? ? ? ?| ? ? ? ? ?                           



                          ? ? ?| ? ? ? ? ? ? ? | ?                           



                          ? ? ? ? ? ? ? ? ? ? ? ?                           



                          ?                                      



                          +--------------+--------                           



                          --------+---------------                           



                          +-----------------------                           



                          -----+| >=0.5 ? ? ? ?|                           



                          Bacterial ? ? ?|                           



                          Encouraged ? ?| ? ? ? ?                           



                          ? ? ? ? ? ? ? ? ? ? | ?                           



                          ? ? ? ? ? ?| infection ?                           



                          ? ?| ? ? ? ? ? ? ? | ? ?                           



                          ? ? ? ? ? ? ? ? ? ? ? ?                           



                          | ? ? ? ? ? ? ?| likely                           



                          ? ? ? ? | ? ? ? ? ? ? ?                           



                          | Consider treatment                           



                          failure                                



                          ?+--------------+-------                           



                          ---------+--------------                           



                          -+ if levels does not                           



                          decrease | >1.0 ? ? ? ?                           



                          | Bacterial ? ? ?|                           



                          Strongly ? ? ?|                           



                          appropriately ? ? ? ? ?                           



                          ? ? | ? ? ? ? ? ? ?|                           



                          infection very |                           



                          encouraged ? ?| ? ? ? ?                           



                          ? ? ? ? ? ? ? ? ? ? | ?                           



                          ? ? ? ? ? ?| likely ? ?                           



                          ? ? | ? ? ? ? ? ? ? | ?                           



                          ? ? ? ? ? ? ? ? ? ? ? ?                           



                          ?                                      



                          +--------------+--------                           



                          --------+---------------                           



                          +-----------------------                           



                          -----+ Percentage of                           



                          drop of Procalcitonin                           



                          calculation for                           



                          Discontinuation of                           



                          antibiotics in                           



                          high-acuity patients                           



                          with suspected or                           



                          confirmed sepsis in                           



                          Adults >= 18 years of                           



                          age. ? ? ? ? ? ? ? ? ? ?                           



                          ? Procalcitonin                           



                          highest{}-Procalcitonin                           



                          current{}Delta                           



                          Procalcitonin =                           



                          ________________________                           



                          _______________________                           



                          x100% ? ? ? ? ? ? ? ? ?                           



                          ? ? ? ? ? ? ?                           



                          Procalcitonin current {}                           



                          IMPORTANT NOTE:                           



                          Procalcitonin may be                           



                          elevated without                           



                          bacterial infection by                           



                          physiologic stress                           



                          related to trauma,                           



                          burns, chronic dialysis,                           



                          metastatic cancer,                           



                          surgery in the past                           



                          seven days, malaria,                           



                          some fungal infections,                           



                          and some forms of                           



                          vasculitis. The                           



                          interpretation algorithm                           



                          may not apply to                           



                          patients with                           



                          immunosuppression                           



                          (equivalent of >10 mg of                           



                          prednisone daily), HIV                           



                          with CD4 cell count <                           



                          350 cells/mm3, active                           



                          malignancy on systemic                           



                          chemotherapy, solid                           



                          organ transplant or                           



                          hematopoietic stem cell                           



                          transplantation, or                           



                          hospital acquired                           



                          pneumonia. Additionally,                           



                          some clinical trials of                           



                          procalcitonin have                           



                          excluded patients with                           



                          shock requiring                           



                          vasopressor use, acute                           



                          respiratory failure                           



                          requiring mechanical                           



                          ventilation, or those                           



                          with known lung                           



                          abscess/empyema. For                           



                          further information                           



                          please refer                           



                          to:[email or web address]                           



                          [email or web address]                           

 

             Lab Interpretation Normal                                 



             (test code = 67954-8)                                        



[id number]10





             Test Item    Value        Reference Range Interpretation Comments

 

             ESR (test code =              See_Comment  H             [Automated

 message]



             9467063658)                                         The system [employer]



                                                                 generated this 

result



                                                                 transmitted ref

erence



                                                                 range: 0 - 10 m

m/HR.



                                                                 The reference r

zhen



                                                                 was not used to



                                                                 interpret this 

result



                                                                 as normal/abnor

mal.

 

             Lab Interpretation (test Abnormal                               



             code = 36003-7)                                        



[facility] 
13:12:27





             Test Item    Value        Reference Range Interpretation Comments

 

             HGB A1C (test code = 6.3 %        4.0-5.7      H            



             4548-4)                                             

 

             MAL (test code = MAL) Reference                              



                          RangesNormal:                           



                          <5.7%Prediabetes:                           



                          5.7 - 6.4%Diabetes:                           



                          > 6.5%                                 

 

             Lab Interpretation (test Abnormal                               



             code = 43039-0)                                        



[id number] 11:53:14





             Test Item    Value        Reference Range Interpretation Comments

 

             NT-proBNP (test code 16 pg/mL     See_Comment                [Autom

ated



             = [id number])                                        message] The



                                                                 system which



                                                                 generated this



                                                                 result



                                                                 transmitted



                                                                 reference range

:



                                                                 <=125. The



                                                                 reference range



                                                                 was not used to



                                                                 interpret this



                                                                 result as



                                                                 normal/abnormal

.

 

             MAL (test code = MAL) Biotin has been                           



                          reported to                            



                          cause a negative                           



                          bias, interpret                           



                          results relative                           



                          to patient's use                           



                          of biotin.                             

 

             Lab Interpretation Normal                                 



             (test code = 09224-2)                                        



[facility] (97293[facility] 
11:44:55





             Test Item    Value        Reference Range Interpretation Comments

 

             NA (test code = 142 mmol/L   135-145                   



             8578291803)                                         

 

             K (test code = 3.9 mmol/L   3.5-5.0                   



             6919712005)                                         

 

             CL (test code = 108 mmol/L                       



             7156884955)                                         

 

             CO2 TOTAL (test code = 24 mmol/L    23-31                     



             2759025057)                                         

 

             AGAP (test code =              2-16                      



             7259129715)                                         

 

             BUN (test code = 11 mg/dL     7-23                      



             3796561090)                                         

 

             GLUCOSE (test code = 205 mg/dL           H            



             7823860342)                                         

 

             CREATININE (test code = 0.71 mg/dL   0.60-1.25                 



             519854)                                         

 

             TOTAL BILI (test code = 0.7 mg/dL    0.1-1.1                   



             7290651192)                                         

 

             CALCIUM (test code = 8.9 mg/dL    8.6-10.6                  



             6047171738)                                         

 

             T PROTEIN (test code = 7.3 g/dL     6.3-8.2                   



             3812770395)                                         

 

             ALBUMIN (test code = 3.8 g/dL     3.5-5.0                   



             6046743970)                                         

 

             ALK PHOS (test code = 135 U/L             H            



             7893589126)                                         

 

             ALTv (test code = 120 U/L      5-50         H            



             1742-6)                                             

 

             AST(SGOT) (test code = 33 U/L       13-40                     



             1764079009)                                         

 

             eGFR (test code =              mL/min/1.73m2              



             3618286709)                                         

 

             MAL (test code = MAL) Association of                           



                          Glomerular Filtration                           



                          Rate (GFR) and Staging                           



                          of Kidney Disease*                           



                          +---------------------                           



                          --+-------------------                           



                          --+-------------------                           



                          ------+| GFR                           



                          (mL/min/1.73 m2) ?|                           



                          With Kidney Damage ?|                           



                          ?Without Kidney                           



                          Damage+---------------                           



                          --------+-------------                           



                          --------+-------------                           



                          ------------+| ?>90 ?                           



                          ? ? ? ? ? ? ? ?|                           



                          ?Stage one ? ? ? ? ?|                           



                          ? Normal ? ? ? ? ? ? ?                           



                          ?+--------------------                           



                          ---+------------------                           



                          ---+------------------                           



                          -------+| ?60-89 ? ? ?                           



                          ? ? ? ? ?| ?Stage two                           



                          ? ? ? ? ?| ? Decreased                           



                          GFR ? ? ? ?                            



                          +---------------------                           



                          --+-------------------                           



                          --+-------------------                           



                          ------+| ?30-59 ? ? ?                           



                          ? ? ? ? ?| ?Stage                           



                          three ? ? ? ?| ? Stage                           



                          three ? ? ? ? ?                           



                          +---------------------                           



                          --+-------------------                           



                          --+-------------------                           



                          ------+| ?15-29 ? ? ?                           



                          ? ? ? ? ?| ?Stage four                           



                          ? ? ? ? | ? Stage four                           



                          ? ? ? ? ?                              



                          ?+--------------------                           



                          ---+------------------                           



                          ---+------------------                           



                          -------+| ?<15 (or                           



                          dialysis) ? ?| ?Stage                           



                          five ? ? ? ? | ? Stage                           



                          five ? ? ? ? ?                           



                          ?+--------------------                           



                          ---+------------------                           



                          ---+------------------                           



                          -------+ *Each stage                           



                          assumes the associated                           



                          GFR level has been in                           



                          effect for at least                           



                          three months. ?Stages                           



                          1 to 5, with or                           



                          without kidney                           



                          disease, indicate                           



                          chronic kidney                           



                          disease. Notes:                           



                          Determination of                           



                          stages one and two                           



                          (with eGFR                             



                          >59mL/min/1.73 m2)                           



                          requires estimation of                           



                          kidney damage for at                           



                          least three months as                           



                          defined by structural                           



                          or functional                           



                          abnormalities of the                           



                          kidney, manifested by                           



                          either:Pathological                           



                          abnormalities or                           



                          Markers of kidney                           



                          damage (including                           



                          abnormalities in the                           



                          composition of the                           



                          blood or urine or                           



                          abnormalities in                           



                          imaging tests).                           

 

             Lab Interpretation Abnormal                               



             (test code = 42013-2)                                        



[facility] 11[facility]44[facility]55





             Test Item    Value        Reference Range Interpretation Comments

 

             MAGNESIUM (test code = 8015242340) 1.6 mg/dL    1.7-2.4      L     

       

 

             Lab Interpretation (test code = Abnormal                           

    



             00111-7)                                            



[facility] 11:44:35





             Test Item    Value        Reference Range Interpretation Comments

 

             PHOSPHORUS (test code = 7014850253) 4.1 mg/dL    2.5-5.0           

        

 

             Lab Interpretation (test code = Normal                             

    



             94370-3)                                            



[facility]CRE[facility] [id number] 11:44:15





             Test Item    Value        Reference Range Interpretation Comments

 

             CK (test code = 1833334090) 50 U/L                           

 

             Lab Interpretation (test code = Normal                             

    



             29990-9)                                            



[facility] [id number] 11:44:15





             Test Item    Value        Reference Range Interpretation Comments

 

             CK (test code = 7976043467) 50 U/L                           

 

             Lab Interpretation (test code = Normal                             

    



             95738-2)                                            



[facility] HoustonCBC WITH [id number] 11:28:14





             Test Item    Value        Reference Range Interpretation Comments

 

             WBC (test code =              See_Comment                [Automated



             6670-2)                                             message] The sy

stem



                                                                 which generated



                                                                 this result



                                                                 transmitted



                                                                 reference range

:



                                                                 4.20 - 10.70



                                                                 10*3/?L. The



                                                                 reference range

 was



                                                                 not used to



                                                                 interpret this



                                                                 result as



                                                                 normal/abnormal

.

 

             RBC (test code =              See_Comment                [Automated



             768-8)                                              message] The sy

stem



                                                                 which generated



                                                                 this result



                                                                 transmitted



                                                                 reference range

:



                                                                 4.26 - 5.52



                                                                 10*6/?L. The



                                                                 reference range

 was



                                                                 not used to



                                                                 interpret this



                                                                 result as



                                                                 normal/abnormal

.

 

             HGB (test code = 15.0 g/dL    12.2-16.4                 



             718-7)                                              

 

             HCT (test code = 44.6 %       38.4-49.3                 



             4544-3)                                             

 

             MCV (test code = 87.6 fL      81.7-95.6                 



             787-2)                                              

 

             MCH (test code = 29.5 pg      26.1-32.7                 



             785-6)                                              

 

             MCHC (test code = 33.6 g/dL    31.2-35.0                 



             786-4)                                              

 

             RDW-SD (test code = 48.4 fL      38.5-51.6                 



             63552-8)                                            

 

             RDW-CV (test code = 15.1 %       12.1-15.4                 



             788-0)                                              

 

             PLT (test code =              See_Comment                [Automated



             777-3)                                              message] The sy

stem



                                                                 which generated



                                                                 this result



                                                                 transmitted



                                                                 reference range

:



                                                                 150 - 328 10*3/

?L.



                                                                 The reference r

zhen



                                                                 was not used to



                                                                 interpret this



                                                                 result as



                                                                 normal/abnormal

.

 

             MPV (test code = 10.2 fL      9.8-13.0                  



             82866-3)                                            

 

             NRBC/100 WBC (test              See_Comment                [Automat

ed



             code = 9903127998)                                        message] 

The system



                                                                 which generated



                                                                 this result



                                                                 transmitted



                                                                 reference range

:



                                                                 0.0 - 10.0 /100



                                                                 WBCs. The refer

ence



                                                                 range was not u

sed



                                                                 to interpret th

is



                                                                 result as



                                                                 normal/abnormal

.

 

             NRBC x10^3 (test code <0.01        See_Comment                [Auto

mated



             = 4921273092)                                        message] The s

ystem



                                                                 which generated



                                                                 this result



                                                                 transmitted



                                                                 reference range

:



                                                                 10*3/?L. The



                                                                 reference range

 was



                                                                 not used to



                                                                 interpret this



                                                                 result as



                                                                 normal/abnormal

.

 

             GRAN MAT (NEUT) % 67.8 %                                 



             (test code = 770-8)                                        

 

             IMM GRAN % (test code 0.80 %                                 



             = 3011176274)                                        

 

             LYMPH % (test code = 19.8 %                                 



             736-9)                                              

 

             MONO % (test code = 8.7 %                                  



             5905-5)                                             

 

             EOS % (test code = 2.7 %                                  



             713-8)                                              

 

             BASO % (test code = 0.2 %                                  



             706-2)                                              

 

             GRAN MAT x10^3(ANC) 6.56 10*3/uL 1.99-6.95                 



             (test code =                                        



             1904848315)                                         

 

             IMM GRAN x10^3 (test 0.08 10*3/uL 0.00-0.06    H            



             code = 4162262842)                                        

 

             LYMPH x10^3 (test code 1.91 10*3/uL 1.09-3.23                 



             = 731-0)                                            

 

             MONO x10^3 (test code 0.84 10*3/uL 0.36-1.02                 



             = 742-7)                                            

 

             EOS x10^3 (test code = 0.26 10*3/uL 0.06-0.53                 



             711-2)                                              

 

             BASO x10^3 (test code <0.03        0.01-0.09                 



             = 704-7)                                            

 

             Lab Interpretation Abnormal                               



             (test code = 34022-4)                                        



[facility] [id number] 10:56:10





             Test Item    Value        Reference Range Interpretation Comments

 

             IRON (test code = 9181463424) 46 ug/dL            L          

  

 

             TIBC (test code = 4686084323) 299 ug/dL    250-410                 

  

 

             % FE SAT (test code = 5084908610) 15 %         20-50        L      

      

 

             Lab Interpretation (test code = Abnormal                           

    



             15547-7)                                            



[facility] [id number] 10:56:10





             Test Item    Value        Reference Range Interpretation Comments

 

             IRON (test code = 7853954991) 46 ug/dL            L          

  

 

             TIBC (test code = 4333654457) 299 ug/dL    250-410                 

  

 

             % FE SAT (test code = 5186335342) 15 %         20-50        L      

      

 

             Lab Interpretation (test code = Abnormal                           

    



             93295-4)                                            



[id number]





             Test Item    Value        Reference Range Interpretation Comments

 

             FERRITIN (test code = 89.2 ng/mL                       



             3681276704)                                         

 

             MAL (test code = MAL) Biotin has been                           



                          reported to cause a                           



                          negative bias,                           



                          interpret results                           



                          relative to                            



                          patient's use of                           



                          biotin.                                

 

             Lab Interpretation (test Normal                                 



             code = 11707-6)                                        



[facility] [id number] 10:13:03





             Test Item    Value        Reference Range Interpretation Comments

 

             FERRITIN (test code = 89.2 ng/mL   18.0-464.0                



             6323649029)                                         

 

             MAL (test code = MAL) Biotin has been                           



                          reported to cause a                           



                          negative bias,                           



                          interpret results                           



                          relative to                            



                          patient's use of                           



                          biotin.                                

 

             Lab Interpretation (test Normal                                 



             code = 43277-8)                                        



[facility] [id number] 10:09:06





             Test Item    Value        Reference Range Interpretation Comments

 

             TSH (test code =              See_Comment                [Automated

 message]



             6501989791)                                         The system [employer]



                                                                 generated this 

result



                                                                 transmitted ref

erence



                                                                 range: 0.45 - 4

.70



                                                                 mIU/L. The refe

rence



                                                                 range was not u

sed to



                                                                 interpret this 

result



                                                                 as normal/abnor

mal.

 

             Lab Interpretation (test Normal                                 



             code = 12810-7)                                        



[facility] [id number] 10:09:06





             Test Item    Value        Reference Range Interpretation Comments

 

             TSH (test code =              See_Comment                [Automated

 message]



             1876493108)                                         The system [employer]



                                                                 generated this 

result



                                                                 transmitted ref

erence



                                                                 range: 0.45 - 4

.70



                                                                 mIU/L. The refe

rence



                                                                 range was not u

sed to



                                                                 interpret this 

result



                                                                 as normal/abnor

mal.

 

             Lab Interpretation (test Normal                                 



             code = 26458-8)                                        



[id number] 09:47:44





             Test Item    Value        Reference Range Interpretation Comments

 

             NT-proBNP (test code 12 pg/mL     See_Comment                [Autom

ated



             = 2210542073)                                        message] The



                                                                 system which



                                                                 generated this



                                                                 result



                                                                 transmitted



                                                                 reference range

:



                                                                 <=125. The



                                                                 reference range



                                                                 was not used to



                                                                 interpret this



                                                                 result as



                                                                 normal/abnormal

.

 

             MAL (test code = MAL) Biotin has been                           



                          reported to                            



                          cause a negative                           



                          bias, interpret                           



                          results relative                           



                          to patient's use                           



                          of biotin.                             

 

             Lab Interpretation Normal                                 



             (test code = 95278-5)                                        



[id number]TOTAL CHOLESTEROL, 
TRIGLYCERIDES, HDL)2022 09:35:58





             Test Item    Value        Reference Range Interpretation Comments

 

             CHOL (test code = 250 mg/dL    120-200      H            



             3683654313)                                         

 

             HDL (test code = 34 mg/dL     See_Comment  L             [Automated

 message]



             5579711979)                                         The system [employer]



                                                                 generated this



                                                                 result transmit

huma



                                                                 reference range

:



                                                                 >=40. The refer

ence



                                                                 range was not u

sed



                                                                 to interpret th

is



                                                                 result as



                                                                 normal/abnormal

.

 

             HDLC RATIO (test code =              See_Comment  H             [Au

tomated message]



             1625759596)                                         The system [employer]



                                                                 generated this



                                                                 result transmit

huma



                                                                 reference range

:



                                                                 <=5.0. The refe

rence



                                                                 range was not u

sed



                                                                 to interpret th

is



                                                                 result as



                                                                 normal/abnormal

.

 

             TRIG (test code = 394 mg/dL           H            



             8128770540)                                         

 

             LDL CHOL (test code = 137 mg/dL    See_Comment                [Auto

mated message]



             43472-9)                                            The system [employer]



                                                                 generated this



                                                                 result transmit

huma



                                                                 reference range

:



                                                                 <=160. The refe

rence



                                                                 range was not u

sed



                                                                 to interpret th

is



                                                                 result as



                                                                 normal/abnormal

.

 

             VLDL (test code = 79 mg/dL     5-60         H            



             4170348384)                                         

 

             Lab Interpretation (test Abnormal                               



             code = 55254-6)                                        



[id number]TOTAL CHOLESTEROL, 
TRIGLYCERIDES, HDL)2022 09:35:58





             Test Item    Value        Reference Range Interpretation Comments

 

             CHOL (test code = 250 mg/dL    120-200      H            



             2248015287)                                         

 

             HDL (test code = 34 mg/dL     >40          L            



             7644692114)                                         

 

             HDLC RATIO (test code =              See_Comment  H             [Au

tomated message]



             1668321747)                                         The system [employer]



                                                                 generated this



                                                                 result transmit

huma



                                                                 reference range

:



                                                                 <=5.0. The refe

rence



                                                                 range was not u

sed



                                                                 to interpret th

is



                                                                 result as



                                                                 normal/abnormal

.

 

             TRIG (test code = 394 mg/dL           H            



             0259023112)                                         

 

             LDL CHOL (test code = 137 mg/dL    See_Comment                [Auto

mated message]



             97285-4)                                            The system [employer]



                                                                 generated this



                                                                 result transmit

huma



                                                                 reference range

:



                                                                 <=160. The refe

rence



                                                                 range was not u

sed



                                                                 to interpret th

is



                                                                 result as



                                                                 normal/abnormal

.

 

             VLDL (test code = 79 mg/dL     5-60         H            



             9984423787)                                         

 

             Lab Interpretation (test Abnormal                               



             code = 30419-1)                                        



[facility]42





             Test Item    Value        Reference Range Interpretation Comments

 

             MAGNESIUM (test code = 8801691726) 1.5 mg/dL    1.7-2.4      L     

       

 

             Lab Interpretation (test code = Abnormal                           

    



             22004-7)                                            



[facility]42





             Test Item    Value        Reference Range Interpretation Comments

 

             PHOSPHORUS (test code = 6607932757) 3.3 mg/dL    2.5-5.0           

        

 

             Lab Interpretation (test code = Normal                             

    



             33408-7)                                            



[facility] ([id number][facility] 
01:29:09





             Test Item    Value        Reference Range Interpretation Comments

 

             NA (test code = 142 mmol/L   135-145                   



             9331184076)                                         

 

             K (test code = 3.7 mmol/L   3.5-5.0                   



             4406057339)                                         

 

             CL (test code = 103 mmol/L                       



             8386207527)                                         

 

             CO2 TOTAL (test code = 27 mmol/L    23-31                     



             4799463048)                                         

 

             AGAP (test code =              2-16                      



             7308670701)                                         

 

             BUN (test code = 11 mg/dL     7-23                      



             4365885491)                                         

 

             GLUCOSE (test code = 166 mg/dL           H            



             9779863089)                                         

 

             CREATININE (test code = 0.61 mg/dL   0.60-1.25                 



             9912967003)                                         

 

             TOTAL BILI (test code = 0.8 mg/dL    0.1-1.1                   



             6108942190)                                         

 

             CALCIUM (test code = 9.3 mg/dL    8.6-10.6                  



             4535694342)                                         

 

             T PROTEIN (test code = 7.4 g/dL     6.3-8.2                   



             3500744343)                                         

 

             ALBUMIN (test code = 4.0 g/dL     3.5-5.0                   



             [id number])                                         

 

             ALK PHOS (test code = 164 U/L             H            



             4282585105)                                         

 

             ALTv (test code = 149 U/L      5-50         H            



             1742-6)                                             

 

             AST(SGOT) (test code = 29 U/L       13-40                     



             [id number])                                         

 

             eGFR (test code =              mL/min/1.73m2              



             1787341175)                                         

 

             MAL (test code = MAL) Association of                           



                          Glomerular Filtration                           



                          Rate (GFR) and Staging                           



                          of Kidney Disease*                           



                          +---------------------                           



                          --+-------------------                           



                          --+-------------------                           



                          ------+| GFR                           



                          (mL/min/1.73 m2) ?|                           



                          With Kidney Damage ?|                           



                          ?Without Kidney                           



                          Damage+---------------                           



                          --------+-------------                           



                          --------+-------------                           



                          ------------+| ?>90 ?                           



                          ? ? ? ? ? ? ? ?|                           



                          ?Stage one ? ? ? ? ?|                           



                          ? Normal ? ? ? ? ? ? ?                           



                          ?+--------------------                           



                          ---+------------------                           



                          ---+------------------                           



                          -------+| ?60-89 ? ? ?                           



                          ? ? ? ? ?| ?Stage two                           



                          ? ? ? ? ?| ? Decreased                           



                          GFR ? ? ? ?                            



                          +---------------------                           



                          --+-------------------                           



                          --+-------------------                           



                          ------+| ?30-59 ? ? ?                           



                          ? ? ? ? ?| ?Stage                           



                          three ? ? ? ?| ? Stage                           



                          three ? ? ? ? ?                           



                          +---------------------                           



                          --+-------------------                           



                          --+-------------------                           



                          ------+| ?15-29 ? ? ?                           



                          ? ? ? ? ?| ?Stage four                           



                          ? ? ? ? | ? Stage four                           



                          ? ? ? ? ?                              



                          ?+--------------------                           



                          ---+------------------                           



                          ---+------------------                           



                          -------+| ?<15 (or                           



                          dialysis) ? ?| ?Stage                           



                          five ? ? ? ? | ? Stage                           



                          five ? ? ? ? ?                           



                          ?+--------------------                           



                          ---+------------------                           



                          ---+------------------                           



                          -------+ *Each stage                           



                          assumes the associated                           



                          GFR level has been in                           



                          effect for at least                           



                          three months. ?Stages                           



                          1 to 5, with or                           



                          without kidney                           



                          disease, indicate                           



                          chronic kidney                           



                          disease. Notes:                           



                          Determination of                           



                          stages one and two                           



                          (with eGFR                             



                          >59mL/min/1.73 m2)                           



                          requires estimation of                           



                          kidney damage for at                           



                          least three months as                           



                          defined by structural                           



                          or functional                           



                          abnormalities of the                           



                          kidney, manifested by                           



                          either:Pathological                           



                          abnormalities or                           



                          Markers of kidney                           



                          damage (including                           



                          abnormalities in the                           



                          composition of the                           



                          blood or urine or                           



                          abnormalities in                           



                          imaging tests).                           

 

             Lab Interpretation Abnormal                               



             (test code = 69694-0)                                        



[facility] [id number] 
01:23:46





             Test Item    Value        Reference Range Interpretation Comments

 

             APTT Patient (test              See_Comment                [Automat

ed



             code = 3173-2)                                        message] The



                                                                 system which



                                                                 generated this



                                                                 result



                                                                 transmitted



                                                                 reference range

:



                                                                 23 - 38 Seconds

.



                                                                 The reference



                                                                 range was not



                                                                 used to interpr

et



                                                                 this result as



                                                                 normal/abnormal

.

 

             MAL (test code = MAL) The [facility] patient                           



                          population mean                           



                          normal value for                           



                          aPTT is 30                             



                          seconds.                               

 

             Lab Interpretation Normal                                 



             (test code = 67449-7)                                        



[facility] [id number] 01:21:51





             Test Item    Value        Reference Range Interpretation Comments

 

             PROTIME PATIENT (test              See_Comment                [Auto

mated message]



             code = 5964-2)                                        The system wh

ich



                                                                 generated this 

result



                                                                 transmitted ref

erence



                                                                 range: 12.0 - 1

4.7



                                                                 Seconds. The re

ference



                                                                 range was not u

sed to



                                                                 interpret this 

result



                                                                 as normal/abnor

mal.

 

             INR (test code = 6301-6)                                        Nor

mal INR <1.1;



                                                                 Warfarin Therap

eutic



                                                                 range 2.0 to 3.

0 or



                                                                 2.5 to 3.5, dep

ending



                                                                 upon the indica

tions.

 

             Lab Interpretation (test Normal                                 



             code = 60464-4)                                        



[facility] [id number] 01:15:28





             Test Item    Value        Reference Range Interpretation Comments

 

             WBC (test code =              See_Comment  H             [Automated



             6690-2)                                             message] The sy

stem



                                                                 which generated



                                                                 this result



                                                                 transmitted



                                                                 reference range

:



                                                                 4.20 - 10.70



                                                                 10*3/?L. The



                                                                 reference range

 was



                                                                 not used to



                                                                 interpret this



                                                                 result as



                                                                 normal/abnormal

.

 

             RBC (test code =              See_Comment  H             [Automated



             789-8)                                              message] The sy

stem



                                                                 which generated



                                                                 this result



                                                                 transmitted



                                                                 reference range

:



                                                                 4.26 - 5.52



                                                                 10*6/?L. The



                                                                 reference range

 was



                                                                 not used to



                                                                 interpret this



                                                                 result as



                                                                 normal/abnormal

.

 

             HGB (test code = 16.5 g/dL    12.2-16.4    H            



             718-7)                                              

 

             HCT (test code = 49.1 %       38.4-49.3                 



             4544-3)                                             

 

             MCV (test code = 87.7 fL      81.7-95.6                 



             787-2)                                              

 

             MCH (test code = 29.5 pg      26.1-32.7                 



             785-6)                                              

 

             MCHC (test code = 33.6 g/dL    31.2-35.0                 



             786-4)                                              

 

             RDW-SD (test code = 48.4 fL      38.5-51.6                 



             61572-0)                                            

 

             RDW-CV (test code = 15.1 %       12.1-15.4                 



             788-0)                                              

 

             PLT (test code =              See_Comment                [Automated



             777-3)                                              message] The sy

stem



                                                                 which generated



                                                                 this result



                                                                 transmitted



                                                                 reference range

:



                                                                 150 - 328 10*3/

?L.



                                                                 The reference r

zhen



                                                                 was not used to



                                                                 interpret this



                                                                 result as



                                                                 normal/abnormal

.

 

             MPV (test code = 10.1 fL      9.8-13.0                  



             90522-8)                                            

 

             NRBC/100 WBC (test              See_Comment                [Automat

ed



             code = 5902323496)                                        message] 

The system



                                                                 which generated



                                                                 this result



                                                                 transmitted



                                                                 reference range

:



                                                                 0.0 - 10.0 /100



                                                                 WBCs. The refer

ence



                                                                 range was not u

sed



                                                                 to interpret th

is



                                                                 result as



                                                                 normal/abnormal

.

 

             NRBC x10^3 (test code <0.01        See_Comment                [Auto

mated



             = [id number])                                        message] The s

ystem



                                                                 which generated



                                                                 this result



                                                                 transmitted



                                                                 reference range

:



                                                                 10*3/?L. The



                                                                 reference range

 was



                                                                 not used to



                                                                 interpret this



                                                                 result as



                                                                 normal/abnormal

.

 

             GRAN MAT (NEUT) % 72.7 %                                 



             (test code = 770-8)                                        

 

             IMM GRAN % (test code 0.60 %                                 



             = [id number])                                        

 

             LYMPH % (test code = 15.5 %                                 



             736-9)                                              

 

             MONO % (test code = 8.2 %                                  



             5905-5)                                             

 

             EOS % (test code = 2.6 %                                  



             713-8)                                              

 

             BASO % (test code = 0.4 %                                  



             706-2)                                              

 

             GRAN MAT x10^3(ANC) 7.83 10*3/uL 1.99-6.95    H            



             (test code =                                        



             4975102133)                                         

 

             IMM GRAN x10^3 (test 0.07 10*3/uL 0.00-0.06    H            



             code = 2252682277)                                        

 

             LYMPH x10^3 (test code 1.67 10*3/uL 1.09-3.23                 



             = 731-0)                                            

 

             MONO x10^3 (test code 0.88 10*3/uL 0.36-1.02                 



             = 742-7)                                            

 

             EOS x10^3 (test code = 0.28 10*3/uL 0.06-0.53                 



             711-2)                                              

 

             BASO x10^3 (test code 0.04 10*3/uL 0.01-0.09                 



             = 704-7)                                            

 

             Lab Interpretation Abnormal                               



             (test code = 43819-9)                                        



[facility] [id number] 01:14:32





             Test Item    Value        Reference Range Interpretation Comments

 

             LACTIC ACID (test code = 1.86 mmol/L  0.50-2.20                 



             6895101975)                                         

 

             Lab Interpretation (test code = Normal                             

    



             40365-4)                                            



[facility] [id number] 12:58:00





             Test Item    Value        Reference Range Interpretation Comments

 

             Glucose Lvl (test code = Glucose Lvl) 228          70-99           

          



[facility]2022-04-12 12:58:00





             Test Item    Value        Reference Range Interpretation Comments

 

             BUN (test code = BUN) 23           7-22                      



[facility]2022-04-12 12:58:00





             Test Item    Value        Reference Range Interpretation Comments

 

             Creatinine Lvl (test code = Creatinine 0.78         0.50-1.40      

           



             Lvl)                                                



[facility]2022-04-12 12:58:00





             Test Item    Value        Reference Range Interpretation Comments

 

             Sodium Lvl (test code = Sodium Lvl) 138          135-145           

        



[facility]2022-04-12 12:58:00





             Test Item    Value        Reference Range Interpretation Comments

 

             Potassium Lvl (test code = Potassium 4.6          3.5-5.1          

         



             Lvl)                                                



[facility]2022-04-12 12:58:00





             Test Item    Value        Reference Range Interpretation Comments

 

             Chloride Lvl (test code = Chloride Lvl) 108                  

            



[facility]2022-04-12 12:58:00





             Test Item    Value        Reference Range Interpretation Comments

 

             CO2 (test code = CO2) 23           24-32                     



[facility]2022-04-12 12:58:00





             Test Item    Value        Reference Range Interpretation Comments

 

             Calcium Lvl (test code = Calcium Lvl) 9.0          8.5-10.5        

          



[facility]2022-04-12 12:58:00





             Test Item    Value        Reference Range Interpretation Comments

 

             AGAP (test code = AGAP) 11.6         10.0-20.0                 



[facility]2022-04-12 12:58:00





             Test Item    Value        Reference Range Interpretation Comments

 

             eGFR (test code = eGFR) 116                                    



[id number]00





             Test Item    Value        Reference Range Interpretation Comments

 

             WBC (test code = WBC) 10.5         3.7-10.4                  



[place] 077282-04-12 12:58:00





             Test Item    Value        Reference Range Interpretation Comments

 

             RBC (test code = RBC) 5.48         4.70-6.10                 



[place] 077282-04-12 12:58:00





             Test Item    Value        Reference Range Interpretation Comments

 

             Hgb (test code = Hgb) 16.0         14.0-18.0                 



[place] 077282-04-12 12:58:00





             Test Item    Value        Reference Range Interpretation Comments

 

             Hct (test code = Hct) 49.8         42.0-54.0                 



[place] 077282-04-12 12:58:00





             Test Item    Value        Reference Range Interpretation Comments

 

             MCV (test code = MCV) 90.8         80.0-94.0                 



[place] 077282-04-12 12:58:00





             Test Item    Value        Reference Range Interpretation Comments

 

             MCH (test code = MCH) 29.1 pg      27.0-31.0                 



[place] 077282-04-12 12:58:00





             Test Item    Value        Reference Range Interpretation Comments

 

             MCHC (test code = MCHC) 32.1         32.0-36.0                 



[place] 077282-04-12 12:58:00





             Test Item    Value        Reference Range Interpretation Comments

 

             RDW (test code = RDW) 15.5         11.5-14.5                 



[place] 077282-04-12 12:58:00





             Test Item    Value        Reference Range Interpretation Comments

 

             Platelet (test code = Platelet) 312          133-450               

    



[place] 077282-04-12 12:58:00





             Test Item    Value        Reference Range Interpretation Comments

 

             MPV (test code = MPV) 8.3          7.4-10.4                  



[place] [address]-04-12 12:58:00





             Test Item    Value        Reference Range Interpretation Comments

 

             PT (test code = PT) 12.9 s       12.0-14.7                 



[place] [address]-04-12 12:58:00





             Test Item    Value        Reference Range Interpretation Comments

 

             INR (test code = INR) 0.98 1       0.85-1.17                 



[place] [address]-04-12 12:58:00





             Test Item    Value        Reference Range Interpretation Comments

 

             PTT (test code = PTT) 27.3 s       22.9-35.8                 



[place] [address]-04-12 12:58:00





             Test Item    Value        Reference Range Interpretation Comments

 

             Segs (test code = Segs) 71.2         45.0-75.0                 



[place] [address]-04-12 12:58:00





             Test Item    Value        Reference Range Interpretation Comments

 

             Lymphocytes (test code = Lymphocytes) 21.1         20.0-40.0       

          



[place] [address]-04-12 12:58:00





             Test Item    Value        Reference Range Interpretation Comments

 

             Monocytes (test code = Monocytes) 6.8          2.0-12.0            

      



[place] 077282-04-12 12:58:00





             Test Item    Value        Reference Range Interpretation Comments

 

             Eosinophils (test code = 0.1          See_Comment                [A

utomated message] The



             Eosinophils)                                        system which ge

nerated



                                                                 this result tra

nsmitted



                                                                 reference range

: <=4.0.



                                                                 The reference r

zhen was



                                                                 not used to int

erpret



                                                                 this result as



                                                                 normal/abnormal

.



[place] 077282-04-12 12:58:00





             Test Item    Value        Reference Range Interpretation Comments

 

             Basophils (test code = 0.8          See_Comment                [Aut

omated message] The



             Basophils)                                          system which ge

nerated



                                                                 this result tra

nsmitted



                                                                 reference range

: <=1.0.



                                                                 The reference r

zhen was



                                                                 not used to int

erpret



                                                                 this result as



                                                                 normal/abnormal

.



[place] 077282-04-12 12:58:00





             Test Item    Value        Reference Range Interpretation Comments

 

             Neutrophils # (test code = Neutrophils 7.5          1.5-8.1        

           



             #)                                                  



[place] 077282-04-12 12:58:00





             Test Item    Value        Reference Range Interpretation Comments

 

             Lymphocytes # (test code = Lymphocytes 2.2          1.0-5.5        

           



             #)                                                  



[place] 077282-04-12 12:58:00





             Test Item    Value        Reference Range Interpretation Comments

 

             Monocytes # (test code 0.7          See_Comment                [Aut

omated message] The



             = Monocytes #)                                        system which 

generated



                                                                 this result tra

nsmitted



                                                                 reference range

: <=0.8.



                                                                 The reference r

zhen was



                                                                 not used to int

erpret



                                                                 this result as



                                                                 normal/abnormal

.



[place] 077282-04-12 12:58:00





             Test Item    Value        Reference Range Interpretation Comments

 

             Basophils # (test code 0.1          See_Comment                [Aut

omated message] The



             = Basophils #)                                        system which 

generated



                                                                 this result tra

nsmitted



                                                                 reference range

: <=0.2.



                                                                 The reference r

zhen was



                                                                 not used to int

erpret



                                                                 this result as



                                                                 normal/abnormal

.



[facility]2022-04-12 12:58:00





             Test Item    Value        Reference Range Interpretation Comments

 

             Glucose Lvl (test code = Glucose Lvl) 228          70-99           

          



[place] 017232-04-12 12:58:00





             Test Item    Value        Reference Range Interpretation Comments

 

             BUN (test code = BUN) 23           7-22                      



[place] 017232-04-12 12:58:00





             Test Item    Value        Reference Range Interpretation Comments

 

             Creatinine Lvl (test code = Creatinine 0.78         0.50-1.40      

           



             Lvl)                                                



[place] 017232-04-12 12:58:00





             Test Item    Value        Reference Range Interpretation Comments

 

             Sodium Lvl (test code = Sodium Lvl) 138          135-145           

        



[place] 017232-04-12 12:58:00





             Test Item    Value        Reference Range Interpretation Comments

 

             Potassium Lvl (test code = Potassium 4.6          3.5-5.1          

         



             Lvl)                                                



[place] 017232-04-12 12:58:00





             Test Item    Value        Reference Range Interpretation Comments

 

             Chloride Lvl (test code = Chloride Lvl) 108                  

            



[place] 017232-04-12 12:58:00





             Test Item    Value        Reference Range Interpretation Comments

 

             CO2 (test code = CO2) 23           24-32                     



[place] 017232-04-12 12:58:00





             Test Item    Value        Reference Range Interpretation Comments

 

             Calcium Lvl (test code = Calcium Lvl) 9.0          8.5-10.5        

          



[facility]2022-04-12 12:58:00





             Test Item    Value        Reference Range Interpretation Comments

 

             AGAP (test code = AGAP) 11.6         10.0-20.0                 



[facility]2022-04-12 12:58:00





             Test Item    Value        Reference Range Interpretation Comments

 

             eGFR (test code = eGFR) 116                                    



[facility]FX[facility]IMFM4853[facility]58:00





             Test Item    Value        Reference Range Interpretation Comments

 

             WBC (test code = WBC) 10.5         3.7-10.4                  



[facility] DallasUcisrmwJVPTWDUEHI5893-47-77 12:58:00





             Test Item    Value        Reference Range Interpretation Comments

 

             RBC (test code = RBC) 5.48         4.70-6.10                 



[place] 077282-04-12 12:58:00





             Test Item    Value        Reference Range Interpretation Comments

 

             Hgb (test code = Hgb) 16.0         14.0-18.0                 



[place] 077282-04-12 12:58:00





             Test Item    Value        Reference Range Interpretation Comments

 

             Hct (test code = Hct) 49.8         42.0-54.0                 



[place] 077282-04-12 12:58:00





             Test Item    Value        Reference Range Interpretation Comments

 

             MCV (test code = MCV) 90.8         80.0-94.0                 



[place] 077282-04-12 12:58:00





             Test Item    Value        Reference Range Interpretation Comments

 

             MCH (test code = MCH) 29.1 pg      27.0-31.0                 



[place] 077282-04-12 12:58:00





             Test Item    Value        Reference Range Interpretation Comments

 

             MCHC (test code = MCHC) 32.1         32.0-36.0                 



[place] [address]-04-12 12:58:00





             Test Item    Value        Reference Range Interpretation Comments

 

             RDW (test code = RDW) 15.5         11.5-14.5                 



[facility] DallasEgztsngBVJPNFBBOO7844-63-38 12:58:00





             Test Item    Value        Reference Range Interpretation Comments

 

             Platelet (test code = Platelet) 312          133-450               

    



[facility] DallasNzvdfadCIUHGTXCOW5587-83-26 12:58:00





             Test Item    Value        Reference Range Interpretation Comments

 

             MPV (test code = MPV) 8.3          7.4-10.4                  



[facility] DallasVtnhqrdUGRJHBGWZK2114-67-05 12:58:00





             Test Item    Value        Reference Range Interpretation Comments

 

             PT (test code = PT) 12.9 s       12.0-14.7                 



[facility] DallasTmfodguWVAEBTGEVH4630-38-86 12:58:00





             Test Item    Value        Reference Range Interpretation Comments

 

             INR (test code = INR) 0.98 1       0.85-1.17                 



[facility] DallasVisjanlOXUMMISYQU9201-69-52 12:58:00





             Test Item    Value        Reference Range Interpretation Comments

 

             PTT (test code = PTT) 27.3 s       22.9-35.8                 



[facility] DallasOngfxeyLRWFPOVEYS8813-76-08 12:58:00





             Test Item    Value        Reference Range Interpretation Comments

 

             Segs (test code = Segs) 71.2         45.0-75.0                 



[facility] DallasDxlnfhcMFGIDSXPQU5940-88-38 12:58:00





             Test Item    Value        Reference Range Interpretation Comments

 

             Lymphocytes (test code = Lymphocytes) 21.1         20.0-40.0       

          



[facility] DallasByhhulrXGXFTJRRUY4715-63-26 12:58:00





             Test Item    Value        Reference Range Interpretation Comments

 

             Monocytes (test code = Monocytes) 6.8          2.0-12.0            

      



[facility] DallasSgqrydlSCDMHUQGWF3217-36-19 12:58:00





             Test Item    Value        Reference Range Interpretation Comments

 

             Eosinophils (test code = 0.1          See_Comment                [A

utomated message] The



             Eosinophils)                                        system which ge

nerated



                                                                 this result tra

nsmitted



                                                                 reference range

: <=4.0.



                                                                 The reference r

zhen was



                                                                 not used to int

erpret



                                                                 this result as



                                                                 normal/abnormal

.



[facility] DallasFoejxwiXUGZGUUEQI2035-65-13 12:58:00





             Test Item    Value        Reference Range Interpretation Comments

 

             Basophils (test code = 0.8          See_Comment                [Aut

omated message] The



             Basophils)                                          system which ge

nerated



                                                                 this result tra

nsmitted



                                                                 reference range

: <=1.0.



                                                                 The reference r

zhen was



                                                                 not used to int

erpret



                                                                 this result as



                                                                 normal/abnormal

.



[facility] DallasFwmwoceDCEFWNEGXY3440-89-97 12:58:00





             Test Item    Value        Reference Range Interpretation Comments

 

             Neutrophils # (test code = Neutrophils 7.5          1.5-8.1        

           



             #)                                                  



[facility] DallasPgdwirwMKAWTPQUWB1410-84-96 12:58:00





             Test Item    Value        Reference Range Interpretation Comments

 

             Lymphocytes # (test code = Lymphocytes 2.2          1.0-5.5        

           



             #)                                                  



[place] 077282-04-12 12:58:00





             Test Item    Value        Reference Range Interpretation Comments

 

             Monocytes # (test code 0.7          See_Comment                [Aut

omated message] The



             = Monocytes #)                                        system which 

generated



                                                                 this result tra

nsmitted



                                                                 reference range

: <=0.8.



                                                                 The reference r

zhen was



                                                                 not used to int

erpret



                                                                 this result as



                                                                 normal/abnormal

.



[place] 077282-04-12 12:58:00





             Test Item    Value        Reference Range Interpretation Comments

 

             Basophils # (test code 0.1          See_Comment                [Aut

omated message] The



             = Basophils #)                                        system which 

generated



                                                                 this result tra

nsmitted



                                                                 reference range

: <=0.2.



                                                                 The reference r

zhen was



                                                                 not used to int

erpret



                                                                 this result as



                                                                 normal/abnormal

.



[id number]





             Test Item    Value        Reference Range Interpretation Comments

 

             Glucose Lvl (test code = Glucose Lvl) 228          70-99           

          



[facility]ZY6829[facility]58[facility]00





             Test Item    Value        Reference Range Interpretation Comments

 

             BUN (test code = BUN) 23           7-22                      



[facility] [id number] 12:58:00





             Test Item    Value        Reference Range Interpretation Comments

 

             Creatinine Lvl (test code = Creatinine 0.78         0.50-1.40      

           



             Lvl)                                                



[facility] [id number] 12:58:00





             Test Item    Value        Reference Range Interpretation Comments

 

             Sodium Lvl (test code = Sodium Lvl) 138          135-145           

        



[facility] [id number] 12:58:00





             Test Item    Value        Reference Range Interpretation Comments

 

             Potassium Lvl (test code = Potassium 4.6          3.5-5.1          

         



             Lvl)                                                



[facility] [id number] 12:58:00





             Test Item    Value        Reference Range Interpretation Comments

 

             Chloride Lvl (test code = Chloride Lvl) 108                  

            



[facility] APFVC6828-43-08 12:58:00





             Test Item    Value        Reference Range Interpretation Comments

 

             CO2 (test code = CO2) 23           24-32                     



[facility] [id number] 12:58:00





             Test Item    Value        Reference Range Interpretation Comments

 

             Calcium Lvl (test code = Calcium Lvl) 9.0          8.5-10.5        

          



[facility] [id number] 12:58:00





             Test Item    Value        Reference Range Interpretation Comments

 

             AGAP (test code = AGAP) 11.6         10.0-20.0                 



[facility]2022-04-12 12:58:00





             Test Item    Value        Reference Range Interpretation Comments

 

             eGFR (test code = eGFR) 116                                    



[facility] DallasWoyompqHIZSXXCNTK0040-08-41 12:58:00





             Test Item    Value        Reference Range Interpretation Comments

 

             WBC (test code = WBC) 10.5         3.7-10.4                  



[facility] DallasGnxterkUTWLGMDCSU4729-78-76 12:58:00





             Test Item    Value        Reference Range Interpretation Comments

 

             RBC (test code = RBC) 5.48         4.70-6.10                 



[facility] DallasXktwtwdYVURHAGNLJ9411-41-22 12:58:00





             Test Item    Value        Reference Range Interpretation Comments

 

             Hgb (test code = Hgb) 16.0         14.0-18.0                 



[facility] DallasQjtattmQMFAKSDLYZ8520-41-95 12:58:00





             Test Item    Value        Reference Range Interpretation Comments

 

             Hct (test code = Hct) 49.8         42.0-54.0                 



[facility] DallasQhztosrSXNURJANJY9387-11-48 12:58:00





             Test Item    Value        Reference Range Interpretation Comments

 

             MCV (test code = MCV) 90.8         80.0-94.0                 



[facility] DallasJiehjuxWWIIRKTAJP1844-78-43 12:58:00





             Test Item    Value        Reference Range Interpretation Comments

 

             MCH (test code = MCH) 29.1 pg      27.0-31.0                 



[facility] DallasWefszgbUBOAGUBDHZ8523-22-98 12:58:00





             Test Item    Value        Reference Range Interpretation Comments

 

             MCHC (test code = MCHC) 32.1         32.0-36.0                 



[facility] DallasOelvmvcAQZCTXQCOA2558-47-06 12:58:00





             Test Item    Value        Reference Range Interpretation Comments

 

             RDW (test code = RDW) 15.5         11.5-14.5                 



[facility] DallasOldpzaqBBMKOIJSXA6801-90-48 12:58:00





             Test Item    Value        Reference Range Interpretation Comments

 

             Platelet (test code = Platelet) 312          133-450               

    



[facility] DallasFrtwsgfAJKDSHTYMC0398-09-09 12:58:00





             Test Item    Value        Reference Range Interpretation Comments

 

             MPV (test code = MPV) 8.3          7.4-10.4                  



[facility] DallasHpluucwHIGUVSOBYT5073-24-29 12:58:00





             Test Item    Value        Reference Range Interpretation Comments

 

             PT (test code = PT) 12.9 s       12.0-14.7                 



[facility] DallasPchsgpmTHQLTSGWAO7978-33-00 12:58:00





             Test Item    Value        Reference Range Interpretation Comments

 

             INR (test code = INR) 0.98 1       0.85-1.17                 



[place] 077282-04-12 12:58:00





             Test Item    Value        Reference Range Interpretation Comments

 

             PTT (test code = PTT) 27.3 s       22.9-35.8                 



[place] 077282-04-12 12:58:00





             Test Item    Value        Reference Range Interpretation Comments

 

             Segs (test code = Segs) 71.2         45.0-75.0                 



[place] 077282-04-12 12:58:00





             Test Item    Value        Reference Range Interpretation Comments

 

             Lymphocytes (test code = Lymphocytes) 21.1         20.0-40.0       

          



[place] [address]-04-12 12:58:00





             Test Item    Value        Reference Range Interpretation Comments

 

             Monocytes (test code = Monocytes) 6.8          2.0-12.0            

      



[place] 077282-04-12 12:58:00





             Test Item    Value        Reference Range Interpretation Comments

 

             Eosinophils (test code = 0.1          See_Comment                [A

utomated message] The



             Eosinophils)                                        system which ge

nerated



                                                                 this result tra

nsmitted



                                                                 reference range

: <=4.0.



                                                                 The reference r

zhen was



                                                                 not used to int

erpret



                                                                 this result as



                                                                 normal/abnormal

.



[place] 077282-04-12 12:58:00





             Test Item    Value        Reference Range Interpretation Comments

 

             Basophils (test code = 0.8          See_Comment                [Aut

omated message] The



             Basophils)                                          system which ge

nerated



                                                                 this result tra

nsmitted



                                                                 reference range

: <=1.0.



                                                                 The reference r

zhen was



                                                                 not used to int

erpret



                                                                 this result as



                                                                 normal/abnormal

.



[facility] DallasDvqknrpPWCLXJOAAQ7149-94-35 12:58:00





             Test Item    Value        Reference Range Interpretation Comments

 

             Neutrophils # (test code = Neutrophils 7.5          1.5-8.1        

           



             #)                                                  



[place] 077282-04-12 12:58:00





             Test Item    Value        Reference Range Interpretation Comments

 

             Lymphocytes # (test code = Lymphocytes 2.2          1.0-5.5        

           



             #)                                                  



[place] 077282-04-12 12:58:00





             Test Item    Value        Reference Range Interpretation Comments

 

             Monocytes # (test code 0.7          See_Comment                [Aut

omated message] The



             = Monocytes #)                                        system which 

generated



                                                                 this result tra

nsmitted



                                                                 reference range

: <=0.8.



                                                                 The reference r

zhen was



                                                                 not used to int

erpret



                                                                 this result as



                                                                 normal/abnormal

.



[place] 077282-04-12 12:58:00





             Test Item    Value        Reference Range Interpretation Comments

 

             Basophils # (test code 0.1          See_Comment                [Aut

omated message] The



             = Basophils #)                                        system which 

generated



                                                                 this result tra

nsmitted



                                                                 reference range

: <=0.2.



                                                                 The reference r

zhen was



                                                                 not used to int

erpret



                                                                 this result as



                                                                 normal/abnormal

.



[place] 017232-04-12 12:58:00





             Test Item    Value        Reference Range Interpretation Comments

 

             Glucose Lvl (test code = Glucose Lvl) 228          70-99           

          



[place] 017232-04-12 12:58:00





             Test Item    Value        Reference Range Interpretation Comments

 

             BUN (test code = BUN) 23           7-22                      



[place] 017232-04-12 12:58:00





             Test Item    Value        Reference Range Interpretation Comments

 

             Creatinine Lvl (test code = Creatinine 0.78         0.50-1.40      

           



             Lvl)                                                



[place] 017232-04-12 12:58:00





             Test Item    Value        Reference Range Interpretation Comments

 

             Sodium Lvl (test code = Sodium Lvl) 138          135-145           

        



[place] 017232-04-12 12:58:00





             Test Item    Value        Reference Range Interpretation Comments

 

             Potassium Lvl (test code = Potassium 4.6          3.5-5.1          

         



             Lvl)                                                



[place] 017232-04-12 12:58:00





             Test Item    Value        Reference Range Interpretation Comments

 

             Chloride Lvl (test code = Chloride Lvl) 108                  

            



[place] 017232-04-12 12:58:00





             Test Item    Value        Reference Range Interpretation Comments

 

             CO2 (test code = CO2) 23           24-32                     



[place] 017232-04-12 12:58:00





             Test Item    Value        Reference Range Interpretation Comments

 

             Calcium Lvl (test code = Calcium Lvl) 9.0          8.5-10.5        

          



[place] 017232-04-12 12:58:00





             Test Item    Value        Reference Range Interpretation Comments

 

             AGAP (test code = AGAP) 11.6         10.0-20.0                 



[place] 017232-04-12 12:58:00





             Test Item    Value        Reference Range Interpretation Comments

 

             eGFR (test code = eGFR) 116                                    



[place] [address]-04-12 12:58:00





             Test Item    Value        Reference Range Interpretation Comments

 

             WBC (test code = WBC) 10.5         3.7-10.4                  



[facility]





             Test Item    Value        Reference Range Interpretation Comments

 

             RBC (test code = RBC) 5.48         4.70-6.10                 



[facility]sHYPY[facility]XLAE5231[facility]36-09 12:58:00





             Test Item    Value        Reference Range Interpretation Comments

 

             Hgb (test code = Hgb) 16.0         14.0-18.0                 



[place] 077282-04-12 12:58:00





             Test Item    Value        Reference Range Interpretation Comments

 

             Hct (test code = Hct) 49.8         42.0-54.0                 



[place] 077282-04-12 12:58:00





             Test Item    Value        Reference Range Interpretation Comments

 

             MCV (test code = MCV) 90.8         80.0-94.0                 



[place] 077282-04-12 12:58:00





             Test Item    Value        Reference Range Interpretation Comments

 

             MCH (test code = MCH) 29.1 pg      27.0-31.0                 



[facility]JCSH1007[facility]14[facility]45 12:58:00





             Test Item    Value        Reference Range Interpretation Comments

 

             MCHC (test code = MCHC) 32.1         32.0-36.0                 



[facility]kjtdtkYAHMTEBUSA8416[facility]24-32 12:58:00





             Test Item    Value        Reference Range Interpretation Comments

 

             RDW (test code = RDW) 15.5         11.5-14.5                 



[facility]vtnatEGFVNKTPSU0140-63-53 12:58:00





             Test Item    Value        Reference Range Interpretation Comments

 

             Platelet (test code = Platelet) 312          133-450               

    



[facility]abvbuuGEEZYETBVG2566[facility]24-03 12:58:00





             Test Item    Value        Reference Range Interpretation Comments

 

             MPV (test code = MPV) 8.3          7.4-10.4                  



[place] 077282-04-12 12:58:00





             Test Item    Value        Reference Range Interpretation Comments

 

             PT (test code = PT) 12.9 s       12.0-14.7                 



[place] 077282-04-12 12:58:00





             Test Item    Value        Reference Range Interpretation Comments

 

             INR (test code = INR) 0.98 1       0.85-1.17                 



[place] 077282-04-12 12:58:00





             Test Item    Value        Reference Range Interpretation Comments

 

             PTT (test code = PTT) 27.3 s       22.9-35.8                 



[place] [address]-04-12 12:58:00





             Test Item    Value        Reference Range Interpretation Comments

 

             Segs (test code = Segs) 71.2         45.0-75.0                 



[place] [address]-04-12 12:58:00





             Test Item    Value        Reference Range Interpretation Comments

 

             Lymphocytes (test code = Lymphocytes) 21.1         20.0-40.0       

          



[place] [address]-04-12 12:58:00





             Test Item    Value        Reference Range Interpretation Comments

 

             Monocytes (test code = Monocytes) 6.8          2.0-12.0            

      



[place] [address]-04-12 12:58:00





             Test Item    Value        Reference Range Interpretation Comments

 

             Eosinophils (test code = 0.1          See_Comment                [A

utomated message] The



             Eosinophils)                                        system which ge

nerated



                                                                 this result tra

nsmitted



                                                                 reference range

: <=4.0.



                                                                 The reference r

zhen was



                                                                 not used to int

erpret



                                                                 this result as



                                                                 normal/abnormal

.



[place] 077282-04-12 12:58:00





             Test Item    Value        Reference Range Interpretation Comments

 

             Basophils (test code = 0.8          See_Comment                [Aut

omated message] The



             Basophils)                                          system which ge

nerated



                                                                 this result tra

nsmitted



                                                                 reference range

: <=1.0.



                                                                 The reference r

zhen was



                                                                 not used to int

erpret



                                                                 this result as



                                                                 normal/abnormal

.



[place] 077282-04-12 12:58:00





             Test Item    Value        Reference Range Interpretation Comments

 

             Neutrophils # (test code = Neutrophils 7.5          1.5-8.1        

           



             #)                                                  



[place] 077282-04-12 12:58:00





             Test Item    Value        Reference Range Interpretation Comments

 

             Lymphocytes # (test code = Lymphocytes 2.2          1.0-5.5        

           



             #)                                                  



[place] [address]-04-12 12:58:00





             Test Item    Value        Reference Range Interpretation Comments

 

             Monocytes # (test code 0.7          See_Comment                [Aut

omated message] The



             = Monocytes #)                                        system which 

generated



                                                                 this result tra

nsmitted



                                                                 reference range

: <=0.8.



                                                                 The reference r

zhen was



                                                                 not used to int

erpret



                                                                 this result as



                                                                 normal/abnormal

.



[place] [address]-04-12 12:58:00





             Test Item    Value        Reference Range Interpretation Comments

 

             Basophils # (test code 0.1          See_Comment                [Aut

omated message] The



             = Basophils #)                                        system which 

generated



                                                                 this result tra

nsmitted



                                                                 reference range

: <=0.2.



                                                                 The reference r

zhen was



                                                                 not used to int

erpret



                                                                 this result as



                                                                 normal/abnormal

.



[facility]2022-04-12 12:58:00





             Test Item    Value        Reference Range Interpretation Comments

 

             Glucose Lvl (test code = Glucose Lvl) 228          70-99           

          



[place] 017232-04-12 12:58:00





             Test Item    Value        Reference Range Interpretation Comments

 

             BUN (test code = BUN) 23           7-22                      



[place] 017232-04-12 12:58:00





             Test Item    Value        Reference Range Interpretation Comments

 

             Creatinine Lvl (test code = Creatinine 0.78         0.50-1.40      

           



             Lvl)                                                



[place] 017232-04-12 12:58:00





             Test Item    Value        Reference Range Interpretation Comments

 

             Sodium Lvl (test code = Sodium Lvl) 138          135-145           

        



[place] 017232-04-12 12:58:00





             Test Item    Value        Reference Range Interpretation Comments

 

             Potassium Lvl (test code = Potassium 4.6          3.5-5.1          

         



             Lvl)                                                



[place] 017232-04-12 12:58:00





             Test Item    Value        Reference Range Interpretation Comments

 

             Chloride Lvl (test code = Chloride Lvl) 108                  

            



[place] 017232-04-12 12:58:00





             Test Item    Value        Reference Range Interpretation Comments

 

             CO2 (test code = CO2) 23           24-32                     



[place] 017232-04-12 12:58:00





             Test Item    Value        Reference Range Interpretation Comments

 

             Calcium Lvl (test code = Calcium Lvl) 9.0          8.5-10.5        

          



[place] 017232-04-12 12:58:00





             Test Item    Value        Reference Range Interpretation Comments

 

             AGAP (test code = AGAP) 11.6         10.0-20.0                 



[place] 017232-04-12 12:58:00





             Test Item    Value        Reference Range Interpretation Comments

 

             eGFR (test code = eGFR) 116                                    



[place] 077282-04-12 12:58:00





             Test Item    Value        Reference Range Interpretation Comments

 

             WBC (test code = WBC) 10.5         3.7-10.4                  



[place] 077282-04-12 12:58:00





             Test Item    Value        Reference Range Interpretation Comments

 

             RBC (test code = RBC) 5.48         4.70-6.10                 



[place] [address]-04-12 12:58:00





             Test Item    Value        Reference Range Interpretation Comments

 

             Hgb (test code = Hgb) 16.0         14.0-18.0                 



[place] [address]-04-12 12:58:00





             Test Item    Value        Reference Range Interpretation Comments

 

             Hct (test code = Hct) 49.8         42.0-54.0                 



[place] 077282-04-12 12:58:00





             Test Item    Value        Reference Range Interpretation Comments

 

             MCV (test code = MCV) 90.8         80.0-94.0                 



[place] [address]-04-12 12:58:00





             Test Item    Value        Reference Range Interpretation Comments

 

             MCH (test code = MCH) 29.1 pg      27.0-31.0                 



[place] 077282-04-12 12:58:00





             Test Item    Value        Reference Range Interpretation Comments

 

             MCHC (test code = MCHC) 32.1         32.0-36.0                 



[place] [address]-04-12 12:58:00





             Test Item    Value        Reference Range Interpretation Comments

 

             RDW (test code = RDW) 15.5         11.5-14.5                 



[place] [address]-04-12 12:58:00





             Test Item    Value        Reference Range Interpretation Comments

 

             Platelet (test code = Platelet) 312          133-450               

    



[facility]MCO[facility]61[facility]10[facility]41 12:58:00





             Test Item    Value        Reference Range Interpretation Comments

 

             MPV (test code = MPV) 8.3          7.4-10.4                  



[place] [address]-04-12 12:58:00





             Test Item    Value        Reference Range Interpretation Comments

 

             PT (test code = PT) 12.9 s       12.0-14.7                 



[place] [address]-04-12 12:58:00





             Test Item    Value        Reference Range Interpretation Comments

 

             INR (test code = INR) 0.98 1       0.85-1.17                 



[place] [address]-04-12 12:58:00





             Test Item    Value        Reference Range Interpretation Comments

 

             PTT (test code = PTT) 27.3 s       22.9-35.8                 



[place] [address]-04-12 12:58:00





             Test Item    Value        Reference Range Interpretation Comments

 

             Segs (test code = Segs) 71.2         45.0-75.0                 



[place] [address]-04-12 12:58:00





             Test Item    Value        Reference Range Interpretation Comments

 

             Lymphocytes (test code = Lymphocytes) 21.1         20.0-40.0       

          



[place] 077282-04-12 12:58:00





             Test Item    Value        Reference Range Interpretation Comments

 

             Monocytes (test code = Monocytes) 6.8          2.0-12.0            

      



[place] 077282-04-12 12:58:00





             Test Item    Value        Reference Range Interpretation Comments

 

             Eosinophils (test code = 0.1          See_Comment                [A

utomated message] The



             Eosinophils)                                        system which ge

nerated



                                                                 this result tra

nsmitted



                                                                 reference range

: <=4.0.



                                                                 The reference r

zhen was



                                                                 not used to int

erpret



                                                                 this result as



                                                                 normal/abnormal

.



[place] 077282-04-12 12:58:00





             Test Item    Value        Reference Range Interpretation Comments

 

             Basophils (test code = 0.8          See_Comment                [Aut

omated message] The



             Basophils)                                          system which ge

nerated



                                                                 this result tra

nsmitted



                                                                 reference range

: <=1.0.



                                                                 The reference r

zhen was



                                                                 not used to int

erpret



                                                                 this result as



                                                                 normal/abnormal

.



[place] 077282-04-12 12:58:00





             Test Item    Value        Reference Range Interpretation Comments

 

             Neutrophils # (test code = Neutrophils 7.5          1.5-8.1        

           



             #)                                                  



[place] 077282-04-12 12:58:00





             Test Item    Value        Reference Range Interpretation Comments

 

             Lymphocytes # (test code = Lymphocytes 2.2          1.0-5.5        

           



             #)                                                  



[place] 077282-04-12 12:58:00





             Test Item    Value        Reference Range Interpretation Comments

 

             Monocytes # (test code 0.7          See_Comment                [Aut

omated message] The



             = Monocytes #)                                        system which 

generated



                                                                 this result tra

nsmitted



                                                                 reference range

: <=0.8.



                                                                 The reference r

zhen was



                                                                 not used to int

erpret



                                                                 this result as



                                                                 normal/abnormal

.



[place] [address]-04-12 12:58:00





             Test Item    Value        Reference Range Interpretation Comments

 

             Basophils # (test code 0.1          See_Comment                [Aut

omated message] The



             = Basophils #)                                        system which 

generated



                                                                 this result tra

nsmitted



                                                                 reference range

: <=0.2.



                                                                 The reference r

zhen was



                                                                 not used to int

erpret



                                                                 this result as



                                                                 normal/abnormal

.



[facility]2022-04-12 12:58:00





             Test Item    Value        Reference Range Interpretation Comments

 

             Glucose Lvl (test code = Glucose Lvl) 228          70-99           

          



[place] 017232-04-12 12:58:00





             Test Item    Value        Reference Range Interpretation Comments

 

             BUN (test code = BUN) 23           7-22                      



[place] 017232-04-12 12:58:00





             Test Item    Value        Reference Range Interpretation Comments

 

             Creatinine Lvl (test code = Creatinine 0.78         0.50-1.40      

           



             Lvl)                                                



[place] 017232-04-12 12:58:00





             Test Item    Value        Reference Range Interpretation Comments

 

             Sodium Lvl (test code = Sodium Lvl) 138          135-145           

        



[place] 017232-04-12 12:58:00





             Test Item    Value        Reference Range Interpretation Comments

 

             Potassium Lvl (test code = Potassium 4.6          3.5-5.1          

         



             Lvl)                                                



[place] 017232-04-12 12:58:00





             Test Item    Value        Reference Range Interpretation Comments

 

             Chloride Lvl (test code = Chloride Lvl) 108                  

            



[place] 017232-04-12 12:58:00





             Test Item    Value        Reference Range Interpretation Comments

 

             CO2 (test code = CO2) 23           24-32                     



[place] 017232-04-12 12:58:00





             Test Item    Value        Reference Range Interpretation Comments

 

             Calcium Lvl (test code = Calcium Lvl) 9.0          8.5-10.5        

          



[place] 017232-04-12 12:58:00





             Test Item    Value        Reference Range Interpretation Comments

 

             AGAP (test code = AGAP) 11.6         10.0-20.0                 



[place] 017232-04-12 12:58:00





             Test Item    Value        Reference Range Interpretation Comments

 

             eGFR (test code = eGFR) 116                                    



[place] 077282-04-12 12:58:00





             Test Item    Value        Reference Range Interpretation Comments

 

             WBC (test code = WBC) 10.5         3.7-10.4                  



[place] [address]-04-12 12:58:00





             Test Item    Value        Reference Range Interpretation Comments

 

             RBC (test code = RBC) 5.48         4.70-6.10                 



[place] 077282-04-12 12:58:00





             Test Item    Value        Reference Range Interpretation Comments

 

             Hgb (test code = Hgb) 16.0         14.0-18.0                 



[place] [address]-04-12 12:58:00





             Test Item    Value        Reference Range Interpretation Comments

 

             Hct (test code = Hct) 49.8         42.0-54.0                 



[place] 077282-04-12 12:58:00





             Test Item    Value        Reference Range Interpretation Comments

 

             MCV (test code = MCV) 90.8         80.0-94.0                 



[place] [address]-04-12 12:58:00





             Test Item    Value        Reference Range Interpretation Comments

 

             MCH (test code = MCH) 29.1 pg      27.0-31.0                 



[place] [address]-04-12 12:58:00





             Test Item    Value        Reference Range Interpretation Comments

 

             MCHC (test code = MCHC) 32.1         32.0-36.0                 



[place] [address]-04-12 12:58:00





             Test Item    Value        Reference Range Interpretation Comments

 

             RDW (test code = RDW) 15.5         11.5-14.5                 



[place] [address]-04-12 12:58:00





             Test Item    Value        Reference Range Interpretation Comments

 

             Platelet (test code = Platelet) 312          133-450               

    



[facility]





             Test Item    Value        Reference Range Interpretation Comments

 

             MPV (test code = MPV) 8.3          7.4-10.4                  



[place] [address]-04-12 12:58:00





             Test Item    Value        Reference Range Interpretation Comments

 

             PT (test code = PT) 12.9 s       12.0-14.7                 



[place] [address]-04-12 12:58:00





             Test Item    Value        Reference Range Interpretation Comments

 

             INR (test code = INR) 0.98 1       0.85-1.17                 



[place] [address]-04-12 12:58:00





             Test Item    Value        Reference Range Interpretation Comments

 

             PTT (test code = PTT) 27.3 s       22.9-35.8                 



[place] [address]-04-12 12:58:00





             Test Item    Value        Reference Range Interpretation Comments

 

             Segs (test code = Segs) 71.2         45.0-75.0                 



[place] [address]-04-12 12:58:00





             Test Item    Value        Reference Range Interpretation Comments

 

             Lymphocytes (test code = Lymphocytes) 21.1         20.0-40.0       

          



[place] [address]-04-12 12:58:00





             Test Item    Value        Reference Range Interpretation Comments

 

             Monocytes (test code = Monocytes) 6.8          2.0-12.0            

      



[place] 077282-04-12 12:58:00





             Test Item    Value        Reference Range Interpretation Comments

 

             Eosinophils (test code = 0.1          See_Comment                [A

utomated message] The



             Eosinophils)                                        system which ge

nerated



                                                                 this result tra

nsmitted



                                                                 reference range

: <=4.0.



                                                                 The reference r

zhen was



                                                                 not used to int

erpret



                                                                 this result as



                                                                 normal/abnormal

.



[place] 077282-04-12 12:58:00





             Test Item    Value        Reference Range Interpretation Comments

 

             Basophils (test code = 0.8          See_Comment                [Aut

omated message] The



             Basophils)                                          system which ge

nerated



                                                                 this result tra

nsmitted



                                                                 reference range

: <=1.0.



                                                                 The reference r

zhen was



                                                                 not used to int

erpret



                                                                 this result as



                                                                 normal/abnormal

.



[place] 077282-04-12 12:58:00





             Test Item    Value        Reference Range Interpretation Comments

 

             Neutrophils # (test code = Neutrophils 7.5          1.5-8.1        

           



             #)                                                  



[place] 077282-04-12 12:58:00





             Test Item    Value        Reference Range Interpretation Comments

 

             Lymphocytes # (test code = Lymphocytes 2.2          1.0-5.5        

           



             #)                                                  



[place] 077282-04-12 12:58:00





             Test Item    Value        Reference Range Interpretation Comments

 

             Monocytes # (test code 0.7          See_Comment                [Aut

omated message] The



             = Monocytes #)                                        system which 

generated



                                                                 this result tra

nsmitted



                                                                 reference range

: <=0.8.



                                                                 The reference r

zhen was



                                                                 not used to int

erpret



                                                                 this result as



                                                                 normal/abnormal

.



[place] [address]-04-12 12:58:00





             Test Item    Value        Reference Range Interpretation Comments

 

             Basophils # (test code 0.1          See_Comment                [Aut

omated message] The



             = Basophils #)                                        system which 

generated



                                                                 this result tra

nsmitted



                                                                 reference range

: <=0.2.



                                                                 The reference r

zhen was



                                                                 not used to int

erpret



                                                                 this result as



                                                                 normal/abnormal

.



[place] 017232-04-12 12:58:00





             Test Item    Value        Reference Range Interpretation Comments

 

             Glucose Lvl (test code = Glucose Lvl) 228          70-99           

          



[id number]





             Test Item    Value        Reference Range Interpretation Comments

 

             BUN (test code = BUN) 23           7-22                      



[facility]2022-04-12 12:58:00





             Test Item    Value        Reference Range Interpretation Comments

 

             Creatinine Lvl (test code = Creatinine 0.78         0.50-1.40      

           



             Lvl)                                                



[place] 017232-04-12 12:58:00





             Test Item    Value        Reference Range Interpretation Comments

 

             Sodium Lvl (test code = Sodium Lvl) 138          135-145           

        



[place] 017232-04-12 12:58:00





             Test Item    Value        Reference Range Interpretation Comments

 

             Potassium Lvl (test code = Potassium 4.6          3.5-5.1          

         



             Lvl)                                                



[place] 017232-04-12 12:58:00





             Test Item    Value        Reference Range Interpretation Comments

 

             Chloride Lvl (test code = Chloride Lvl) 108                  

            



[place] 017232-04-12 12:58:00





             Test Item    Value        Reference Range Interpretation Comments

 

             CO2 (test code = CO2) 23           24-32                     



[place] 017232-04-12 12:58:00





             Test Item    Value        Reference Range Interpretation Comments

 

             Calcium Lvl (test code = Calcium Lvl) 9.0          8.5-10.5        

          



[place] 017232-04-12 12:58:00





             Test Item    Value        Reference Range Interpretation Comments

 

             AGAP (test code = AGAP) 11.6         10.0-20.0                 



[place] 017232-04-12 12:58:00





             Test Item    Value        Reference Range Interpretation Comments

 

             eGFR (test code = eGFR) 116                                    



[place] 077282-04-12 12:58:00





             Test Item    Value        Reference Range Interpretation Comments

 

             WBC (test code = WBC) 10.5         3.7-10.4                  



[place] [address]-04-12 12:58:00





             Test Item    Value        Reference Range Interpretation Comments

 

             RBC (test code = RBC) 5.48         4.70-6.10                 



[place] [address]-04-12 12:58:00





             Test Item    Value        Reference Range Interpretation Comments

 

             Hgb (test code = Hgb) 16.0         14.0-18.0                 



[place] [address]-04-12 12:58:00





             Test Item    Value        Reference Range Interpretation Comments

 

             Hct (test code = Hct) 49.8         42.0-54.0                 



[place] 077282-04-12 12:58:00





             Test Item    Value        Reference Range Interpretation Comments

 

             MCV (test code = MCV) 90.8         80.0-94.0                 



[place] 077282-04-12 12:58:00





             Test Item    Value        Reference Range Interpretation Comments

 

             MCH (test code = MCH) 29.1 pg      27.0-31.0                 



[place] 077282-04-12 12:58:00





             Test Item    Value        Reference Range Interpretation Comments

 

             MCHC (test code = MCHC) 32.1         32.0-36.0                 



[place] 077282-04-12 12:58:00





             Test Item    Value        Reference Range Interpretation Comments

 

             RDW (test code = RDW) 15.5         11.5-14.5                 



[place] 077282-04-12 12:58:00





             Test Item    Value        Reference Range Interpretation Comments

 

             Platelet (test code = Platelet) 312          133-450               

    



[place] 077282-04-12 12:58:00





             Test Item    Value        Reference Range Interpretation Comments

 

             MPV (test code = MPV) 8.3          7.4-10.4                  



[place] 077282-04-12 12:58:00





             Test Item    Value        Reference Range Interpretation Comments

 

             PT (test code = PT) 12.9 s       12.0-14.7                 



[place] [address]-04-12 12:58:00





             Test Item    Value        Reference Range Interpretation Comments

 

             INR (test code = INR) 0.98 1       0.85-1.17                 



[place] [address]-04-12 12:58:00





             Test Item    Value        Reference Range Interpretation Comments

 

             PTT (test code = PTT) 27.3 s       22.9-35.8                 



[place] [address]-04-12 12:58:00





             Test Item    Value        Reference Range Interpretation Comments

 

             Segs (test code = Segs) 71.2         45.0-75.0                 



[place] [address]-04-12 12:58:00





             Test Item    Value        Reference Range Interpretation Comments

 

             Lymphocytes (test code = Lymphocytes) 21.1         20.0-40.0       

          



[place] [address]-04-12 12:58:00





             Test Item    Value        Reference Range Interpretation Comments

 

             Monocytes (test code = Monocytes) 6.8          2.0-12.0            

      



[place] [address]-04-12 12:58:00





             Test Item    Value        Reference Range Interpretation Comments

 

             Eosinophils (test code = 0.1          See_Comment                [A

utomated message] The



             Eosinophils)                                        system which ge

nerated



                                                                 this result tra

nsmitted



                                                                 reference range

: <=4.0.



                                                                 The reference r

zhen was



                                                                 not used to int

erpret



                                                                 this result as



                                                                 normal/abnormal

.



[id number]





             Test Item    Value        Reference Range Interpretation Comments

 

             Basophils (test code = 0.8          See_Comment                [Aut

omated message] The



             Basophils)                                          system which ge

nerated



                                                                 this result tra

nsmitted



                                                                 reference range

: <=1.0.



                                                                 The reference r

zhen was



                                                                 not used to int

erpret



                                                                 this result as



                                                                 normal/abnormal

.



[facility] Dallas[facility]ubiiwqLGUAEOFJKN7539-86-96 12:58:00





             Test Item    Value        Reference Range Interpretation Comments

 

             Neutrophils # (test code = Neutrophils 7.5          1.5-8.1        

           



             #)                                                  



[facility] Dallas[facility]jopnceYUQAUBEJQC7056-74-68 12:58:00





             Test Item    Value        Reference Range Interpretation Comments

 

             Lymphocytes # (test code = Lymphocytes 2.2          1.0-5.5        

           



             #)                                                  



[facility] DallasUymojlsWPOSFOUYIJ0453-34-72 12:58:00





             Test Item    Value        Reference Range Interpretation Comments

 

             Monocytes # (test code 0.7          See_Comment                [Aut

omated message] The



             = Monocytes #)                                        system which 

generated



                                                                 this result tra

nsmitted



                                                                 reference range

: <=0.8.



                                                                 The reference r

zhen was



                                                                 not used to int

erpret



                                                                 this result as



                                                                 normal/abnormal

.



[facility] DallasDqtyxhiBDELBRJTSY0145-72-97 12:58:00





             Test Item    Value        Reference Range Interpretation Comments

 

             Basophils # (test code 0.1          See_Comment                [Aut

omated message] The



             = Basophils #)                                        system which 

generated



                                                                 this result tra

nsmitted



                                                                 reference range

: <=0.2.



                                                                 The reference r

zhen was



                                                                 not used to int

erpret



                                                                 this result as



                                                                 normal/abnormal

.



[id number]





             Test Item    Value        Reference Range Interpretation Comments

 

             Tube Num CSF (test code = Tube Num CSF) 3 1                        

            



[facility] [id number] 11:59:00





             Test Item    Value        Reference Range Interpretation Comments

 

             Color CSF (test code Colorless (22 6:59                       

    



             = Color CSF) AM)                                    



[facility] [id number] 11:59:00





             Test Item    Value        Reference Range Interpretation Comments

 

             Clarity CSF (test code = Clear (22 6:59                       

    



             Clarity CSF) AM)                                    



[facility]2022-04-12 11:59:00





             Test Item    Value        Reference Range Interpretation Comments

 

             Supernat CSF (test Colorless (22 6:59                         

  



             code = Supernat CSF) AM)                                    



[facility]2022-04-12 11:59:00





             Test Item    Value        Reference Range Interpretation Comments

 

             Nucleated Cells CSF 3            See_Comment                [Automa

huma message] The



             (test code = Nucleated                                        syste

m which generated



             Cells CSF)                                          this result tra

nsmitted



                                                                 reference range

: <=53.



                                                                 The reference r

zhen was



                                                                 not used to int

erpret



                                                                 this result as



                                                                 normal/abnormal

.



[facility]2022-04-12 11:59:00





             Test Item    Value        Reference Range Interpretation Comments

 

             RBC CSF (test code = 64           See_Comment                [Autom

ated message] The



             RBC CSF)                                            system which ge

nerated this



                                                                 result transmit

huma



                                                                 reference range

: <=03. The



                                                                 reference range

 was not



                                                                 used to interpr

et this



                                                                 result as zayda

l/abnormal.



[facility]2022-04-12 11:59:00





             Test Item    Value        Reference Range Interpretation Comments

 

             Comment CSF (test Differential not performed                       

    



             code = Comment CSF) on WBC count of less than                      

     



                          5. Cell counts performed                           



                          on CSF greater than two                           



                          hours after collection may                           



                          not be representative due                           



                          to cellular degradation.                           



[facility]2022-04-12 11:59:00





             Test Item    Value        Reference Range Interpretation Comments

 

             Glucose CSF (test code = Glucose CSF) 129          45-80           

          



[facility]2022-04-12 11:59:00





             Test Item    Value        Reference Range Interpretation Comments

 

             Protein CSF (test code = Protein CSF) 110          15-45           

          



[facility] [id number] 11:59:00





             Test Item    Value        Reference Range Interpretation Comments

 

             Gram Stain Report Gram Stain Performed By:                         

  



             (test code = Gram [facility]                           



             St[facility] Report) [facility] [id number] 11:59:00





             Test Item    Value        Reference Range Interpretation Comments

 

             Culture: CSF w/Gram 48 Hour Report - No                           



             Stain (test code = Growth, Holding                           



             Culture: CSF w/Gram                                        



             Stain)                                              



[facility]2022-04-12 11:59:00





             Test Item    Value        Reference Range Interpretation Comments

 

             Tube Num CSF (test code = Tube Num CSF) 3 1                        

            



[facility]2022-04-12 11:59:00





             Test Item    Value        Reference Range Interpretation Comments

 

             Color CSF (test code Colorless (22 6:59                       

    



             = Color CSF) AM)                                    



[facility]2022-04-12 11:59:00





             Test Item    Value        Reference Range Interpretation Comments

 

             Clarity CSF (test code = Clear (22 6:59                       

    



             Clarity CSF) AM)                                    



[facility]2022-04-12 11:59:00





             Test Item    Value        Reference Range Interpretation Comments

 

             Supernat CSF (test Colorless (22 6:59                         

  



             code = Supernat CSF) AM)                                    



[facility]2022-04-12 11:59:00





             Test Item    Value        Reference Range Interpretation Comments

 

             Nucleated Cells CSF 3            See_Comment                [Automa

huma message] The



             (test code = Nucleated                                        syste

m which generated



             Cells CSF)                                          this result tra

nsmitted



                                                                 reference range

: <=53.



                                                                 The reference r

zhen was



                                                                 not used to int

erpret



                                                                 this result as



                                                                 normal/abnormal

.



[facility]2022-04-12 11:59:00





             Test Item    Value        Reference Range Interpretation Comments

 

             RBC CSF (test code = 64           See_Comment                [Autom

ated message] The



             RBC CSF)                                            system which ge

nerated this



                                                                 result transmit

huma



                                                                 reference range

: <=03. The



                                                                 reference range

 was not



                                                                 used to interpr

et this



                                                                 result as zayda

l/abnormal.



[facility]2022-04-12 11:59:00





             Test Item    Value        Reference Range Interpretation Comments

 

             Comment CSF (test Differential not performed                       

    



             code = Comment CSF) on WBC count of less than                      

     



                          5. Cell counts performed                           



                          on CSF greater than two                           



                          hours after collection may                           



                          not be representative due                           



                          to cellular degradation.                           



[facility]2022-04-12 11:59:00





             Test Item    Value        Reference Range Interpretation Comments

 

             Glucose CSF (test code = Glucose CSF) 129          45-80           

          



[facility]2022-04-12 11:59:00





             Test Item    Value        Reference Range Interpretation Comments

 

             Protein CSF (test code = Protein CSF) 110          15-45           

          



[facility] [id number] 11:59:00





             Test Item    Value        Reference Range Interpretation Comments

 

             Gram Stain Report Gram Stain Performed By:                         

  



             (test code = Gram [facility]                           



             Stain Report) [facility] w/Gram [id number] 11:59:00





             Test Item    Value        Reference Range Interpretation Comments

 

             Culture: CSF w/Gram 48 Hour Report - No                           



             Stain (test code = Growth, Holding                           



             Culture: CSF w/Gram                                        



             Stain)                                              



[facility] [id number] 11:59:00





             Test Item    Value        Reference Range Interpretation Comments

 

             Tube Num CSF (test code = Tube Num CSF) 3 1                        

            



[facility] [facility]59:00





             Test Item    Value        Reference Range Interpretation Comments

 

             Color CSF (test code Colorless (22 6:59                       

    



             = Color CSF) AM)                                    



[facility]2022-04-12 11:59:00





             Test Item    Value        Reference Range Interpretation Comments

 

             Clarity CSF (test code = Clear (22 6:59                       

    



             Clarity CSF) AM)                                    



[facility]2022-04-12 11:59:00





             Test Item    Value        Reference Range Interpretation Comments

 

             Supernat CSF (test Colorless (22 6:59                         

  



             code = Supernat CSF) AM)                                    



[facility]2022-04-12 11:59:00





             Test Item    Value        Reference Range Interpretation Comments

 

             Nucleated Cells CSF 3            See_Comment                [Automa

huma message] The



             (test code = Nucleated                                        syste

m which generated



             Cells CSF)                                          this result tra

nsmitted



                                                                 reference range

: <=53.



                                                                 The reference r

zhen was



                                                                 not used to int

erpret



                                                                 this result as



                                                                 normal/abnormal

.



[facility]2022-04-12 11:59:00





             Test Item    Value        Reference Range Interpretation Comments

 

             RBC CSF (test code = 64           See_Comment                [Autom

ated message] The



             RBC CSF)                                            system which ge

nerated this



                                                                 result transmit

huma



                                                                 reference range

: <=03. The



                                                                 reference range

 was not



                                                                 used to interpr

et this



                                                                 result as zayda

l/abnormal.



[facility]2022-04-12 11:59:00





             Test Item    Value        Reference Range Interpretation Comments

 

             Comment CSF (test Differential not performed                       

    



             code = Comment CSF) on WBC count of less than                      

     



                          5. Cell counts performed                           



                          on CSF greater than two                           



                          hours after collection may                           



                          not be representative due                           



                          to cellular degradation.                           



[facility] [id number] 11:59:00





             Test Item    Value        Reference Range Interpretation Comments

 

             Glucose CSF (test code = Glucose CSF) 129          45-80           

          



[facility] [id number] 11:59:00





             Test Item    Value        Reference Range Interpretation Comments

 

             Protein CSF (test code = Protein CSF) 110          15-45           

          



[id number]





             Test Item    Value        Reference Range Interpretation Comments

 

             Gram Stain Report Gram Stain Performed By:                         

  



             (test code = Gram [facility]                           



             [facility] Report[facility] [id number] 11:59:00





             Test Item    Value        Reference Range Interpretation Comments

 

             Culture: CSF w/Gram 48 Hour Report - No                           



             Stain (test code = Growth, Holding                           



             Culture: CSF w/Gram                                        



             Stain)                                              



[facility]2022-04-12 11:59:00





             Test Item    Value        Reference Range Interpretation Comments

 

             Tube Num CSF (test code = Tube Num CSF) 3 1                        

            



[facility]2022-04-12 11:59:00





             Test Item    Value        Reference Range Interpretation Comments

 

             Color CSF (test code Colorless (22 6:59                       

    



             = Color CSF) AM)                                    



[facility]2022-04-12 11:59:00





             Test Item    Value        Reference Range Interpretation Comments

 

             Clarity CSF (test code = Clear (22 6:59                       

    



             Clarity CSF) AM)                                    



[facility]2022-04-12 11:59:00





             Test Item    Value        Reference Range Interpretation Comments

 

             Supernat CSF (test Colorless (22 6:59                         

  



             code = Supernat CSF) AM)                                    



[facility]2022-04-12 11:59:00





             Test Item    Value        Reference Range Interpretation Comments

 

             Nucleated Cells CSF 3            See_Comment                [Automa

huma message] The



             (test code = Nucleated                                        syste

m which generated



             Cells CSF)                                          this result tra

nsmitted



                                                                 reference range

: <=53.



                                                                 The reference r

zhen was



                                                                 not used to int

erpret



                                                                 this result as



                                                                 normal/abnormal

.



[facility]2022-04-12 11:59:00





             Test Item    Value        Reference Range Interpretation Comments

 

             RBC CSF (test code = 64           See_Comment                [Autom

ated message] The



             RBC CSF)                                            system which ge

nerated this



                                                                 result transmit

huma



                                                                 reference range

: <=03. The



                                                                 reference range

 was not



                                                                 used to interpr

et this



                                                                 result as zayda

l/abnormal.



[facility]2022-04-12 11:59:00





             Test Item    Value        Reference Range Interpretation Comments

 

             Comment CSF (test Differential not performed                       

    



             code = Comment CSF) on WBC count of less than                      

     



                          5. Cell counts performed                           



                          on CSF greater than two                           



                          hours after collection may                           



                          not be representative due                           



                          to cellular degradation.                           



[facility]2022-04-12 11:59:00





             Test Item    Value        Reference Range Interpretation Comments

 

             Glucose CSF (test code = Glucose CSF) 129          45-80           

          



[facility] [id number] 11:59:00





             Test Item    Value        Reference Range Interpretation Comments

 

             Protein CSF (test code = Protein CSF) 110          15-45           

          



[facility] [id number] 11:59:00





             Test Item    Value        Reference Range Interpretation Comments

 

             Gram Stain Report Gram Stain Performed By:                         

  



             (test code = Gram [facility]                           



             Stain Report) [facility]ram [id number] 11:59:00





             Test Item    Value        Reference Range Interpretation Comments

 

             Culture: CSF w/Gram 48 Hour Report - No                           



             Stain (test code = Growth, Holding                           



             Culture: CSF w/Gram                                        



             Stain)                                              



[facility] [id number] 11:59:00





             Test Item    Value        Reference Range Interpretation Comments

 

             Tube Num CSF (test code = Tube Num CSF) 3 1                        

            



[facility]2022-04-12 11:59:00





             Test Item    Value        Reference Range Interpretation Comments

 

             Color CSF (test code Colorless (22 6:59                       

    



             = Color CSF) AM)                                    



[facility]2022-04-12 11:59:00





             Test Item    Value        Reference Range Interpretation Comments

 

             Clarity CSF (test code = Clear (22 6:59                       

    



             Clarity CSF) AM)                                    



[facility]2022-04-12 11:59:00





             Test Item    Value        Reference Range Interpretation Comments

 

             Supernat CSF (test Colorless (22 6:59                         

  



             code = Supernat CSF) AM)                                    



[facility]2022-04-12 11:59:00





             Test Item    Value        Reference Range Interpretation Comments

 

             Nucleated Cells CSF 3            See_Comment                [Automa

huma message] The



             (test code = Nucleated                                        syste

m which generated



             Cells CSF)                                          this result tra

nsmitted



                                                                 reference range

: <=53.



                                                                 The reference r

zhen was



                                                                 not used to int

erpret



                                                                 this result as



                                                                 normal/abnormal

.



[facility]2022-04-12 11:59:00





             Test Item    Value        Reference Range Interpretation Comments

 

             RBC CSF (test code = 64           See_Comment                [Autom

ated message] The



             RBC CSF)                                            system which ge

nerated this



                                                                 result transmit

huma



                                                                 reference range

: <=03. The



                                                                 reference range

 was not



                                                                 used to interpr

et this



                                                                 result as zayda

l/abnormal.



[facility]2022-04-12 11:59:00





             Test Item    Value        Reference Range Interpretation Comments

 

             Comment CSF (test Differential not performed                       

    



             code = Comment CSF) on WBC count of less than                      

     



                          5. Cell counts performed                           



                          on CSF greater than two                           



                          hours after collection may                           



                          not be representative due                           



                          to cellular degradation.                           



[facility] [id number] 11:59:00





             Test Item    Value        Reference Range Interpretation Comments

 

             Glucose CSF (test code = Glucose CSF) 129          45-80           

          



[facility] [id number] 11:59:00





             Test Item    Value        Reference Range Interpretation Comments

 

             Protein CSF (test code = Protein CSF) 110          15-45           

          



[facility] [id number] 11:59:00





             Test Item    Value        Reference Range Interpretation Comments

 

             Gram Stain Report Gram Stain Performed By:                         

  



             (test code = Gram [facility]                           



             Stain Report) [facility]ram [id number] 11:59:00





             Test Item    Value        Reference Range Interpretation Comments

 

             Culture: CSF w/Gram 48 Hour Report - No                           



             Stain (test code = Growth, Holding                           



             Culture: CSF w/Gram                                        



             Stain)                                              



[facility]2022-04-12 11:59:00





             Test Item    Value        Reference Range Interpretation Comments

 

             Tube Num CSF (test code = Tube Num CSF) 3 1                        

            



[facility]2022-04-12 11:59:00





             Test Item    Value        Reference Range Interpretation Comments

 

             Color CSF (test code Colorless (22 6:59                       

    



             = Color CSF) AM)                                    



[facility]2022-04-12 11:59:00





             Test Item    Value        Reference Range Interpretation Comments

 

             Clarity CSF (test code = Clear (22 6:59                       

    



             Clarity CSF) AM)                                    



[facility]2022-04-12 11:59:00





             Test Item    Value        Reference Range Interpretation Comments

 

             Supernat CSF (test Colorless (22 6:59                         

  



             code = Supernat CSF) AM)                                    



[facility]2022-04-12 11:59:00





             Test Item    Value        Reference Range Interpretation Comments

 

             Nucleated Cells CSF 3            See_Comment                [Automa

huma message] The



             (test code = Nucleated                                        syste

m which generated



             Cells CSF)                                          this result tra

nsmitted



                                                                 reference range

: <=53.



                                                                 The reference r

zhen was



                                                                 not used to int

erpret



                                                                 this result as



                                                                 normal/abnormal

.



[facility]2022-04-12 11:59:00





             Test Item    Value        Reference Range Interpretation Comments

 

             RBC CSF (test code = 64           See_Comment                [Autom

ated message] The



             RBC CSF)                                            system which ge

nerated this



                                                                 result transmit

huma



                                                                 reference range

: <=03. The



                                                                 reference range

 was not



                                                                 used to interpr

et this



                                                                 result as zayda

l/abnormal.



[facility]2022-04-12 11:59:00





             Test Item    Value        Reference Range Interpretation Comments

 

             Comment CSF (test Differential not performed                       

    



             code = Comment CSF) on WBC count of less than                      

     



                          5. Cell counts performed                           



                          on CSF greater than two                           



                          hours after collection may                           



                          not be representative due                           



                          to cellular degradation.                           



[facility] [id number] 11:59:00





             Test Item    Value        Reference Range Interpretation Comments

 

             Glucose CSF (test code = Glucose CSF) 129          45-80           

          



[facility] [id number] 11:59:00





             Test Item    Value        Reference Range Interpretation Comments

 

             Protein CSF (test code = Protein CSF) 110          15-45           

          



[facility] [id number] 11:59:00





             Test Item    Value        Reference Range Interpretation Comments

 

             Gram Stain Report Gram Stain Performed By:                         

  



             (test code = Gram [facility]                           



             Stain Report) [facility] w/Gram [id number] 11:59:00





             Test Item    Value        Reference Range Interpretation Comments

 

             Culture: CSF w/Gram 48 Hour Report - No                           



             Stain (test code = Growth, Holding                           



             Culture: CSF w/Gram                                        



             Stain)                                              



[facility]2022-04-12 11:59:00





             Test Item    Value        Reference Range Interpretation Comments

 

             Tube Num CSF (test code = Tube Num CSF) 3 1                        

            



[facility]2022-04-12 11:59:00





             Test Item    Value        Reference Range Interpretation Comments

 

             Color CSF (test code Colorless (22 6:59                       

    



             = Color CSF) AM)                                    



[facility]2022-04-12 11:59:00





             Test Item    Value        Reference Range Interpretation Comments

 

             Clarity CSF (test code = Clear (22 6:59                       

    



             Clarity CSF) AM)                                    



[facility]2022-04-12 11:59:00





             Test Item    Value        Reference Range Interpretation Comments

 

             Supernat CSF (test Colorless (22 6:59                         

  



             code = Supernat CSF) AM)                                    



[facility]2022-04-12 11:59:00





             Test Item    Value        Reference Range Interpretation Comments

 

             Nucleated Cells CSF 3            See_Comment                [Automa

huma message] The



             (test code = Nucleated                                        syste

m which generated



             Cells CSF)                                          this result tra

nsmitted



                                                                 reference range

: <=53.



                                                                 The reference r

zhen was



                                                                 not used to int

erpret



                                                                 this result as



                                                                 normal/abnormal

.



[facility] [id number] 11:59:00





             Test Item    Value        Reference Range Interpretation Comments

 

             RBC CSF (test code = 64           See_Comment                [Autom

ated message] The



             RBC CSF)                                            system which ge

nerated this



                                                                 result transmit

huma



                                                                 reference range

: <=03. The



                                                                 reference range

 was not



                                                                 used to interpr

et this



                                                                 result as zayda

l/abnormal.



[facility] [id number] 11:59:00





             Test Item    Value        Reference Range Interpretation Comments

 

             Comment CSF (test Differential not performed                       

    



             code = Comment CSF) on WBC count of less than                      

     



                          5. Cell counts performed                           



                          on CSF greater than two                           



                          hours after collection may                           



                          not be representative due                           



                          to cellular degradation.                           



[facility] [id number] 11:59:00





             Test Item    Value        Reference Range Interpretation Comments

 

             Glucose CSF (test code = Glucose CSF) 129          45-80           

          



[facility] [id number] 11:59:00





             Test Item    Value        Reference Range Interpretation Comments

 

             Protein CSF (test code = Protein CSF) 110          15-45           

          



[facility] [id number] 11:59:00





             Test Item    Value        Reference Range Interpretation Comments

 

             Gram Stain Report Gram Stain Performed By:                         

  



             (test code = Gram [facility]                           



             Stain Report) [facility] w/Gram [id number] 11:59:00





             Test Item    Value        Reference Range Interpretation Comments

 

             Culture: CSF w/Gram 48 Hour Report - No                           



             Stain (test code = Growth, Holding                           



             Culture: CSF w/Gram                                        



             Stain)                                              



[facility] [id number] 14:46:00





             Test Item    Value        Reference Range Interpretation Comments

 

             Hgb A1C (test code = Hgb A1C) 5.6                                  

  



[facility] HospitalannsevenloadIAL [id number] 14:46:00





             Test Item    Value        Reference Range Interpretation Comments

 

             Hgb A1C (test code = Hgb A1C) 5.6                                  

  



[id number] 14[facility]46:00





             Test Item    Value        Reference Range Interpretation Comments

 

             Hgb A1C (test code = Hgb A1C) 5.6                                  

  



[facility] [id number] 14:46:00





             Test Item    Value        Reference Range Interpretation Comments

 

             Hgb A1C (test code = Hgb A1C) 5.6                                  

  



[facility] [id number] 14:46:00





             Test Item    Value        Reference Range Interpretation Comments

 

             Hgb A1C (test code = Hgb A1C) 5.6                                  

  



[facility] [id number] 14:46:00





             Test Item    Value        Reference Range Interpretation Comments

 

             Hgb A1C (test code = Hgb A1C) 5.6                                  

  



[facility] [id number] 14:46:00





             Test Item    Value        Reference Range Interpretation Comments

 

             Hgb A1C (test code = Hgb A1C) 5.6                                  

  



[facility] [id number] 11:43:00





             Test Item    Value        Reference Range Interpretation Comments

 

             Glucose Lvl (test code = Glucose Lvl) 418          70-99           

          



[facility]2022-04-11 11:43:00





             Test Item    Value        Reference Range Interpretation Comments

 

             BUN (test code = BUN) 22           7-22                      



[facility]2022-04-11 11:43:00





             Test Item    Value        Reference Range Interpretation Comments

 

             Creatinine Lvl (test code = Creatinine 1.10         0.50-1.40      

           



             Lvl)                                                



[facility]2022-04-11 11:43:00





             Test Item    Value        Reference Range Interpretation Comments

 

             Sodium Lvl (test code = Sodium Lvl) 138          135-145           

        



[facility]2022-04-11 11:43:00





             Test Item    Value        Reference Range Interpretation Comments

 

             Potassium Lvl (test code = Potassium 4.9          3.5-5.1          

         



             Lvl)                                                



[facility]2022-04-11 11:43:00





             Test Item    Value        Reference Range Interpretation Comments

 

             Chloride Lvl (test code = Chloride Lvl) 113                  

            



[facility]2022-04-11 11:43:00





             Test Item    Value        Reference Range Interpretation Comments

 

             CO2 (test code = CO2) 16           24-32                     



[facility]2022-04-11 11:43:00





             Test Item    Value        Reference Range Interpretation Comments

 

             AGAP (test code = AGAP) 13.9         10.0-20.0                 



[facility]2022-04-11 11:43:00





             Test Item    Value        Reference Range Interpretation Comments

 

             Calcium Lvl (test code = Calcium Lvl) 9.0          8.5-10.5        

          



[facility]2022-04-11 11:43:00





             Test Item    Value        Reference Range Interpretation Comments

 

             eGFR (test code = eGFR) 86                                     



[facility]ngttlTZPNVNJLKH0168[facility]74-77 11:43:00





             Test Item    Value        Reference Range Interpretation Comments

 

             WBC (test code = WBC) 10.2         3.7-10.4                  



[facility] DallasSutxdowCGLNWVZSIV2955-39-43 11:43:00





             Test Item    Value        Reference Range Interpretation Comments

 

             RBC (test code = RBC) 5.46         4.70-6.10                 



[facility] DallasOhyxwssJTOJWLDNCC4067-59-38 11:43:00





             Test Item    Value        Reference Range Interpretation Comments

 

             Hgb (test code = Hgb) 16.3         14.0-18.0                 



[facility] DallasErozuifWWULHHARUZ0021-96-95 11:43:00





             Test Item    Value        Reference Range Interpretation Comments

 

             Hct (test code = Hct) 50.0         42.0-54.0                 



[facility] DallasZqsdutaTXZRGQIZYD2697-36-55 11:43:00





             Test Item    Value        Reference Range Interpretation Comments

 

             MCV (test code = MCV) 91.4         80.0-94.0                 



[facility] DallasBgsbsvzAKBMJCFYEW4086-21-40 11:43:00





             Test Item    Value        Reference Range Interpretation Comments

 

             MCH (test code = MCH) 29.9 pg      27.0-31.0                 



[facility] DallasZwxnywfFWPKFFFMWF2849-03-28 11:43:00





             Test Item    Value        Reference Range Interpretation Comments

 

             MCHC (test code = MCHC) 32.7         32.0-36.0                 



[facility] DallasDtpkdzdHVEVWLCGBO2823-33-60 11:43:00





             Test Item    Value        Reference Range Interpretation Comments

 

             RDW (test code = RDW) 15.7         11.5-14.5                 



[facility] DallasQlhtmfxUIWILYKBJO2467-49-20 11:43:00





             Test Item    Value        Reference Range Interpretation Comments

 

             Platelet (test code = Platelet) 321          133-450               

    



[facility] DallasUylvmrnVPIWDFDWPE8233-95-77 11:43:00





             Test Item    Value        Reference Range Interpretation Comments

 

             MPV (test code = MPV) 8.6          7.4-10.4                  



[facility] DallasUduzwvuJBGYLMBTIE8012-97-06 11:43:00





             Test Item    Value        Reference Range Interpretation Comments

 

             Segs (test code = Segs) 92.0         45.0-75.0                 



[facility] DallasHrkqpkwAVYQOXLFZZ1872-36-21 11:43:00





             Test Item    Value        Reference Range Interpretation Comments

 

             Lymphocytes (test code = Lymphocytes) 5.2          20.0-40.0       

          



[facility] DallasCvatnfqJLUAIUMVBP0869-22-94 11:43:00





             Test Item    Value        Reference Range Interpretation Comments

 

             Monocytes (test code = Monocytes) 1.6          2.0-12.0            

      



[place] 077282-04-11 11:43:00





             Test Item    Value        Reference Range Interpretation Comments

 

             Basophils (test code = 1.2          See_Comment                [Aut

omated message] The



             Basophils)                                          system which ge

nerated



                                                                 this result tra

nsmitted



                                                                 reference range

: <=1.0.



                                                                 The reference r

zhen was



                                                                 not used to int

erpret



                                                                 this result as



                                                                 normal/abnormal

.



[place] 077282-04-11 11:43:00





             Test Item    Value        Reference Range Interpretation Comments

 

             Neutrophils # (test code = Neutrophils 9.4          1.5-8.1        

           



             #)                                                  



[place] 077282-04-11 11:43:00





             Test Item    Value        Reference Range Interpretation Comments

 

             Lymphocytes # (test code = Lymphocytes 0.5          1.0-5.5        

           



             #)                                                  



[place] 077282-04-11 11:43:00





             Test Item    Value        Reference Range Interpretation Comments

 

             Monocytes # (test code 0.2          See_Comment                [Aut

omated message] The



             = Monocytes #)                                        system which 

generated



                                                                 this result tra

nsmitted



                                                                 reference range

: <=0.8.



                                                                 The reference r

zhen was



                                                                 not used to int

erpret



                                                                 this result as



                                                                 normal/abnormal

.



[facility] DallasRrcryiuNQLLGYKFEH2228-99-14 11:43:00





             Test Item    Value        Reference Range Interpretation Comments

 

             Basophils # (test code 0.1          See_Comment                [Aut

omated message] The



             = Basophils #)                                        system which 

generated



                                                                 this result tra

nsmitted



                                                                 reference range

: <=0.2.



                                                                 The reference r

zhen was



                                                                 not used to int

erpret



                                                                 this result as



                                                                 normal/abnormal

.



[facility]2022-04-11 11:43:00





             Test Item    Value        Reference Range Interpretation Comments

 

             Glucose Lvl (test code = Glucose Lvl) 418          70-99           

          



[facility]2022-04-11 11:43:00





             Test Item    Value        Reference Range Interpretation Comments

 

             BUN (test code = BUN) 22           7-22                      



[place] 017232-04-11 11:43:00





             Test Item    Value        Reference Range Interpretation Comments

 

             Creatinine Lvl (test code = Creatinine 1.10         0.50-1.40      

           



             Lvl)                                                



[facility]2022-04-11 11:43:00





             Test Item    Value        Reference Range Interpretation Comments

 

             Sodium Lvl (test code = Sodium Lvl) 138          135-145           

        



[place] 017232-04-11 11:43:00





             Test Item    Value        Reference Range Interpretation Comments

 

             Potassium Lvl (test code = Potassium 4.9          3.5-5.1          

         



             Lvl)                                                



[place] 017232-04-11 11:43:00





             Test Item    Value        Reference Range Interpretation Comments

 

             Chloride Lvl (test code = Chloride Lvl) 113                  

            



[place] 017232-04-11 11:43:00





             Test Item    Value        Reference Range Interpretation Comments

 

             CO2 (test code = CO2) 16           24-32                     



[place] 017232-04-11 11:43:00





             Test Item    Value        Reference Range Interpretation Comments

 

             AGAP (test code = AGAP) 13.9         10.0-20.0                 



[place] 017232-04-11 11:43:00





             Test Item    Value        Reference Range Interpretation Comments

 

             Calcium Lvl (test code = Calcium Lvl) 9.0          8.5-10.5        

          



[facility]2022-04-11 11:43:00





             Test Item    Value        Reference Range Interpretation Comments

 

             eGFR (test code = eGFR) 86                                     



[place] 077282-04-11 11:43:00





             Test Item    Value        Reference Range Interpretation Comments

 

             WBC (test code = WBC) 10.2         3.7-10.4                  



[place] 077282-04-11 11:43:00





             Test Item    Value        Reference Range Interpretation Comments

 

             RBC (test code = RBC) 5.46         4.70-6.10                 



[place] 077282-04-11 11:43:00





             Test Item    Value        Reference Range Interpretation Comments

 

             Hgb (test code = Hgb) 16.3         14.0-18.0                 



[place] 077282-04-11 11:43:00





             Test Item    Value        Reference Range Interpretation Comments

 

             Hct (test code = Hct) 50.0         42.0-54.0                 



[place] [address]-04-11 11:43:00





             Test Item    Value        Reference Range Interpretation Comments

 

             MCV (test code = MCV) 91.4         80.0-94.0                 



[place] 077282-04-11 11:43:00





             Test Item    Value        Reference Range Interpretation Comments

 

             MCH (test code = MCH) 29.9 pg      27.0-31.0                 



[place] 077282-04-11 11:43:00





             Test Item    Value        Reference Range Interpretation Comments

 

             MCHC (test code = MCHC) 32.7         32.0-36.0                 



[facility] DallasDjwuhjsYMQRTPOQUG5609-52-37 11:43:00





             Test Item    Value        Reference Range Interpretation Comments

 

             RDW (test code = RDW) 15.7         11.5-14.5                 



[facility] DallasDzevmhcOGQZIHTMFF5891-00-83 11:43:00





             Test Item    Value        Reference Range Interpretation Comments

 

             Platelet (test code = Platelet) 321          133-450               

    



[facility] DallasCcyfiwpSAYARNDJKF1646-95-99 11:43:00





             Test Item    Value        Reference Range Interpretation Comments

 

             MPV (test code = MPV) 8.6          7.4-10.4                  



[facility] DallasKfnerodJCIHMFYUUR8709-33-02 11:43:00





             Test Item    Value        Reference Range Interpretation Comments

 

             Segs (test code = Segs) 92.0         45.0-75.0                 



[facility] DallasLufgqcsVRTXTIJZFB1803-12-87 11:43:00





             Test Item    Value        Reference Range Interpretation Comments

 

             Lymphocytes (test code = Lymphocytes) 5.2          20.0-40.0       

          



[facility] DallasSafdbmfGMQSDLKOAP3793-18-70 11:43:00





             Test Item    Value        Reference Range Interpretation Comments

 

             Monocytes (test code = Monocytes) 1.6          2.0-12.0            

      



[facility] DallasNvehaudDVOZURSIUL5642-77-06 11:43:00





             Test Item    Value        Reference Range Interpretation Comments

 

             Basophils (test code = 1.2          See_Comment                [Aut

omated message] The



             Basophils)                                          system which ge

nerated



                                                                 this result tra

nsmitted



                                                                 reference range

: <=1.0.



                                                                 The reference r

zhen was



                                                                 not used to int

erpret



                                                                 this result as



                                                                 normal/abnormal

.



[facility] DallasFtiasbsKURFZCCZTG7980-27-40 11:43:00





             Test Item    Value        Reference Range Interpretation Comments

 

             Neutrophils # (test code = Neutrophils 9.4          1.5-8.1        

           



             #)                                                  



[facility] DallasTdzbeiePGFIAFERVK7976-21-81 11:43:00





             Test Item    Value        Reference Range Interpretation Comments

 

             Lymphocytes # (test code = Lymphocytes 0.5          1.0-5.5        

           



             #)                                                  



[facility] DallasGyevyccQPIQGKFCEE4542-79-81 11:43:00





             Test Item    Value        Reference Range Interpretation Comments

 

             Monocytes # (test code 0.2          See_Comment                [Aut

omated message] The



             = Monocytes #)                                        system which 

generated



                                                                 this result tra

nsmitted



                                                                 reference range

: <=0.8.



                                                                 The reference r

zhen was



                                                                 not used to int

erpret



                                                                 this result as



                                                                 normal/abnormal

.



[place] 077282-04-11 11:43:00





             Test Item    Value        Reference Range Interpretation Comments

 

             Basophils # (test code 0.1          See_Comment                [Aut

omated message] The



             = Basophils #)                                        system which 

generated



                                                                 this result tra

nsmitted



                                                                 reference range

: <=0.2.



                                                                 The reference r

zhen was



                                                                 not used to int

erpret



                                                                 this result as



                                                                 normal/abnormal

.



[place] 017232-04-11 11:43:00





             Test Item    Value        Reference Range Interpretation Comments

 

             Glucose Lvl (test code = Glucose Lvl) 418          70-99           

          



[place] 017232-04-11 11:43:00





             Test Item    Value        Reference Range Interpretation Comments

 

             BUN (test code = BUN) 22           7-22                      



[place] 017232-04-11 11:43:00





             Test Item    Value        Reference Range Interpretation Comments

 

             Creatinine Lvl (test code = Creatinine 1.10         0.50-1.40      

           



             Lvl)                                                



[facility]2022-04-11 11:43:00





             Test Item    Value        Reference Range Interpretation Comments

 

             Sodium Lvl (test code = Sodium Lvl) 138          135-145           

        



[facility]2022-04-11 11:43:00





             Test Item    Value        Reference Range Interpretation Comments

 

             Potassium Lvl (test code = Potassium 4.9          3.5-5.1          

         



             Lvl)                                                



[facility]2022-04-11 11:43:00





             Test Item    Value        Reference Range Interpretation Comments

 

             Chloride Lvl (test code = Chloride Lvl) 113                  

            



[facility]2022-04-11 11:43:00





             Test Item    Value        Reference Range Interpretation Comments

 

             CO2 (test code = CO2) 16           24-32                     



[place] 017232-04-11 11:43:00





             Test Item    Value        Reference Range Interpretation Comments

 

             AGAP (test code = AGAP) 13.9         10.0-20.0                 



[place] 017232-04-11 11:43:00





             Test Item    Value        Reference Range Interpretation Comments

 

             Calcium Lvl (test code = Calcium Lvl) 9.0          8.5-10.5        

          



[place] 017232-04-11 11:43:00





             Test Item    Value        Reference Range Interpretation Comments

 

             eGFR (test code = eGFR) 86                                     



[place] 077282-04-11 11:43:00





             Test Item    Value        Reference Range Interpretation Comments

 

             WBC (test code = WBC) 10.2         3.7-10.4                  



[facility] DallasHeupqeiNMIWYRVJVM1650-36-97 11:43:00





             Test Item    Value        Reference Range Interpretation Comments

 

             RBC (test code = RBC) 5.46         4.70-6.10                 



[facility] DallasOfqdndtJRXDUQYEVU5526-66-34 11:43:00





             Test Item    Value        Reference Range Interpretation Comments

 

             Hgb (test code = Hgb) 16.3         14.0-18.0                 



[facility] DallasZvmhgqzSMZBDMLKYZ7317-17-85 11:43:00





             Test Item    Value        Reference Range Interpretation Comments

 

             Hct (test code = Hct) 50.0         42.0-54.0                 



[facility] DallasLogwseuSKCVSMDJVT3048-81-80 11:43:00





             Test Item    Value        Reference Range Interpretation Comments

 

             MCV (test code = MCV) 91.4         80.0-94.0                 



[facility] DallasKxsdydfZJDUFAFVVP0475-59-28 11:43:00





             Test Item    Value        Reference Range Interpretation Comments

 

             MCH (test code = MCH) 29.9 pg      27.0-31.0                 



[facility] DallasFfenzywWHTBMAVXEC2293-20-69 11:43:00





             Test Item    Value        Reference Range Interpretation Comments

 

             MCHC (test code = MCHC) 32.7         32.0-36.0                 



[facility] DallasVvzpvkyONRBUHXNGP7550-91-14 11:43:00





             Test Item    Value        Reference Range Interpretation Comments

 

             RDW (test code = RDW) 15.7         11.5-14.5                 



[facility] DallasKwbnddbDDPQSJOCVJ7757-55-93 11:43:00





             Test Item    Value        Reference Range Interpretation Comments

 

             Platelet (test code = Platelet) 321          133-450               

    



[facility] DallasUqfabsrLUFVPMYHDU8581-00-36 11:43:00





             Test Item    Value        Reference Range Interpretation Comments

 

             MPV (test code = MPV) 8.6          7.4-10.4                  



[facility] DallasXipydeqMTYUBQXZII5628-54-63 11:43:00





             Test Item    Value        Reference Range Interpretation Comments

 

             Segs (test code = Segs) 92.0         45.0-75.0                 



[facility] DallasIxqfozqSWOVRERKRG3527-55-95 11:43:00





             Test Item    Value        Reference Range Interpretation Comments

 

             Lymphocytes (test code = Lymphocytes) 5.2          20.0-40.0       

          



[facility] DallasNgtzuakAICUHKLOPG6621-52-75 11:43:00





             Test Item    Value        Reference Range Interpretation Comments

 

             Monocytes (test code = Monocytes) 1.6          2.0-12.0            

      



[facility] DallasUrijtabEMQTTBJTDC5078-21-83 11:43:00





             Test Item    Value        Reference Range Interpretation Comments

 

             Basophils (test code = 1.2          See_Comment                [Aut

omated message] The



             Basophils)                                          system which ge

nerated



                                                                 this result tra

nsmitted



                                                                 reference range

: <=1.0.



                                                                 The reference r

zhen was



                                                                 not used to int

erpret



                                                                 this result as



                                                                 normal/abnormal

.



[place] 077282-04-11 11:43:00





             Test Item    Value        Reference Range Interpretation Comments

 

             Neutrophils # (test code = Neutrophils 9.4          1.5-8.1        

           



             #)                                                  



[place] 077282-04-11 11:43:00





             Test Item    Value        Reference Range Interpretation Comments

 

             Lymphocytes # (test code = Lymphocytes 0.5          1.0-5.5        

           



             #)                                                  



[place] 077282-04-11 11:43:00





             Test Item    Value        Reference Range Interpretation Comments

 

             Monocytes # (test code 0.2          See_Comment                [Aut

omated message] The



             = Monocytes #)                                        system which 

generated



                                                                 this result tra

nsmitted



                                                                 reference range

: <=0.8.



                                                                 The reference r

zhen was



                                                                 not used to int

erpret



                                                                 this result as



                                                                 normal/abnormal

.



[place] 077282-04-11 11:43:00





             Test Item    Value        Reference Range Interpretation Comments

 

             Basophils # (test code 0.1          See_Comment                [Aut

omated message] The



             = Basophils #)                                        system which 

generated



                                                                 this result tra

nsmitted



                                                                 reference range

: <=0.2.



                                                                 The reference r

zhen was



                                                                 not used to int

erpret



                                                                 this result as



                                                                 normal/abnormal

.



[facility]2022-04-11 11:43:00





             Test Item    Value        Reference Range Interpretation Comments

 

             Glucose Lvl (test code = Glucose Lvl) 418          70-99           

          



[place] 017232-04-11 11:43:00





             Test Item    Value        Reference Range Interpretation Comments

 

             BUN (test code = BUN) 22           7-22                      



[place] 017232-04-11 11:43:00





             Test Item    Value        Reference Range Interpretation Comments

 

             Creatinine Lvl (test code = Creatinine 1.10         0.50-1.40      

           



             Lvl)                                                



[place] 017232-04-11 11:43:00





             Test Item    Value        Reference Range Interpretation Comments

 

             Sodium Lvl (test code = Sodium Lvl) 138          135-145           

        



[place] 017232-04-11 11:43:00





             Test Item    Value        Reference Range Interpretation Comments

 

             Potassium Lvl (test code = Potassium 4.9          3.5-5.1          

         



             Lvl)                                                



[facility]2022-04-11 11:43:00





             Test Item    Value        Reference Range Interpretation Comments

 

             Chloride Lvl (test code = Chloride Lvl) 113                  

            



[facility]2022-04-11 11:43:00





             Test Item    Value        Reference Range Interpretation Comments

 

             CO2 (test code = CO2) 16           24-32                     



[place] 017232-04-11 11:43:00





             Test Item    Value        Reference Range Interpretation Comments

 

             AGAP (test code = AGAP) 13.9         10.0-20.0                 



[place] 017232-04-11 11:43:00





             Test Item    Value        Reference Range Interpretation Comments

 

             Calcium Lvl (test code = Calcium Lvl) 9.0          8.5-10.5        

          



[facility]2022-04-11 11:43:00





             Test Item    Value        Reference Range Interpretation Comments

 

             eGFR (test code = eGFR) 86                                     



[facility]





             Test Item    Value        Reference Range Interpretation Comments

 

             WBC (test code = WBC) 10.2         3.7-10.4                  



[place] 077282-04-11 11:43:00





             Test Item    Value        Reference Range Interpretation Comments

 

             RBC (test code = RBC) 5.46         4.70-6.10                 



[facility]al[facility]wpODECEDXLSD0854[facility]88[facility]78 11:43:00





             Test Item    Value        Reference Range Interpretation Comments

 

             Hgb (test code = Hgb) 16.3         14.0-18.0                 



[facility]AocznmhUSRZFJFCWP1322-30-67 11:43:00





             Test Item    Value        Reference Range Interpretation Comments

 

             Hct (test code = Hct) 50.0         42.0-54.0                 



[place] 077282-04-11 11:43:00





             Test Item    Value        Reference Range Interpretation Comments

 

             MCV (test code = MCV) 91.4         80.0-94.0                 



[place] 077282-04-11 11:43:00





             Test Item    Value        Reference Range Interpretation Comments

 

             MCH (test code = MCH) 29.9 pg      27.0-31.0                 



[place] 077282-04-11 11:43:00





             Test Item    Value        Reference Range Interpretation Comments

 

             MCHC (test code = MCHC) 32.7         32.0-36.0                 



[place] 077282-04-11 11:43:00





             Test Item    Value        Reference Range Interpretation Comments

 

             RDW (test code = RDW) 15.7         11.5-14.5                 



[facility]jmvlpnTRGNWKEYXP5696[facility]84-55 11:43:00





             Test Item    Value        Reference Range Interpretation Comments

 

             Platelet (test code = Platelet) 321          133-450               

    



[place] 077282-04-11 11:43:00





             Test Item    Value        Reference Range Interpretation Comments

 

             MPV (test code = MPV) 8.6          7.4-10.4                  



[place] 077282-04-11 11:43:00





             Test Item    Value        Reference Range Interpretation Comments

 

             Segs (test code = Segs) 92.0         45.0-75.0                 



[place] 077282-04-11 11:43:00





             Test Item    Value        Reference Range Interpretation Comments

 

             Lymphocytes (test code = Lymphocytes) 5.2          20.0-40.0       

          



[place] 077282-04-11 11:43:00





             Test Item    Value        Reference Range Interpretation Comments

 

             Monocytes (test code = Monocytes) 1.6          2.0-12.0            

      



[place] 077282-04-11 11:43:00





             Test Item    Value        Reference Range Interpretation Comments

 

             Basophils (test code = 1.2          See_Comment                [Aut

omated message] The



             Basophils)                                          system which ge

nerated



                                                                 this result tra

nsmitted



                                                                 reference range

: <=1.0.



                                                                 The reference r

zhen was



                                                                 not used to int

erpret



                                                                 this result as



                                                                 normal/abnormal

.



[facility] DallasEtmgavmZXQDVHNBKN3340[facility]69-55 11:43:00





             Test Item    Value        Reference Range Interpretation Comments

 

             Neutrophils # (test code = Neutrophils 9.4          1.5-8.1        

           



             #)                                                  



[facility] DallasWnhvjofCMYOSQGJEG9411-65-92 11:43:00





             Test Item    Value        Reference Range Interpretation Comments

 

             Lymphocytes # (test code = Lymphocytes 0.5          1.0-5.5        

           



             #)                                                  



[place] 077282-04-11 11:43:00





             Test Item    Value        Reference Range Interpretation Comments

 

             Monocytes # (test code 0.2          See_Comment                [Aut

omated message] The



             = Monocytes #)                                        system which 

generated



                                                                 this result tra

nsmitted



                                                                 reference range

: <=0.8.



                                                                 The reference r

zhen was



                                                                 not used to int

erpret



                                                                 this result as



                                                                 normal/abnormal

.



[facility] DallasQblqxygWWJTXGDCFI4491-02-41 11:43:00





             Test Item    Value        Reference Range Interpretation Comments

 

             Basophils # (test code 0.1          See_Comment                [Aut

omated message] The



             = Basophils #)                                        system which 

generated



                                                                 this result tra

nsmitted



                                                                 reference range

: <=0.2.



                                                                 The reference r

zhen was



                                                                 not used to int

erpret



                                                                 this result as



                                                                 normal/abnormal

.



[facility]2022-04-11 11:43:00





             Test Item    Value        Reference Range Interpretation Comments

 

             Glucose Lvl (test code = Glucose Lvl) 418          70-99           

          



[facility]2022-04-11 11:43:00





             Test Item    Value        Reference Range Interpretation Comments

 

             BUN (test code = BUN) 22           7-22                      



[place] 017232-04-11 11:43:00





             Test Item    Value        Reference Range Interpretation Comments

 

             Creatinine Lvl (test code = Creatinine 1.10         0.50-1.40      

           



             Lvl)                                                



[facility]2022-04-11 11:43:00





             Test Item    Value        Reference Range Interpretation Comments

 

             Sodium Lvl (test code = Sodium Lvl) 138          135-145           

        



[facility]2022-04-11 11:43:00





             Test Item    Value        Reference Range Interpretation Comments

 

             Potassium Lvl (test code = Potassium 4.9          3.5-5.1          

         



             Lvl)                                                



[facility]2022-04-11 11:43:00





             Test Item    Value        Reference Range Interpretation Comments

 

             Chloride Lvl (test code = Chloride Lvl) 113                  

            



[facility]2022-04-11 11:43:00





             Test Item    Value        Reference Range Interpretation Comments

 

             CO2 (test code = CO2) 16           24-32                     



[facility]2022-04-11 11:43:00





             Test Item    Value        Reference Range Interpretation Comments

 

             AGAP (test code = AGAP) 13.9         10.0-20.0                 



[facility]2022-04-11 11:43:00





             Test Item    Value        Reference Range Interpretation Comments

 

             Calcium Lvl (test code = Calcium Lvl) 9.0          8.5-10.5        

          



[facility]2022-04-11 11:43:00





             Test Item    Value        Reference Range Interpretation Comments

 

             eGFR (test code = eGFR) 86                                     



[place] 077282-04-11 11:43:00





             Test Item    Value        Reference Range Interpretation Comments

 

             WBC (test code = WBC) 10.2         3.7-10.4                  



[place] 077282-04-11 11:43:00





             Test Item    Value        Reference Range Interpretation Comments

 

             RBC (test code = RBC) 5.46         4.70-6.10                 



[facility] DallasRxldrnvRXDUFNLQZS3595-64-89 11:43:00





             Test Item    Value        Reference Range Interpretation Comments

 

             Hgb (test code = Hgb) 16.3         14.0-18.0                 



[facility] DallasQdxpcqcUKAEDQYAMH5062-56-72 11:43:00





             Test Item    Value        Reference Range Interpretation Comments

 

             Hct (test code = Hct) 50.0         42.0-54.0                 



[facility] DallasGhvpxriHPAAOVVDPP1316-24-11 11:43:00





             Test Item    Value        Reference Range Interpretation Comments

 

             MCV (test code = MCV) 91.4         80.0-94.0                 



[facility] DallasDozutmdKUXCQWHOBU9325-18-35 11:43:00





             Test Item    Value        Reference Range Interpretation Comments

 

             MCH (test code = MCH) 29.9 pg      27.0-31.0                 



[facility] DallasHuehjlfPFESKMZXIN3510-79-22 11:43:00





             Test Item    Value        Reference Range Interpretation Comments

 

             MCHC (test code = MCHC) 32.7         32.0-36.0                 



[facility] DallasVphtaisGJNZXTXSWB2317-14-96 11:43:00





             Test Item    Value        Reference Range Interpretation Comments

 

             RDW (test code = RDW) 15.7         11.5-14.5                 



[facility] DallasSccsrntLNSUFJIUHI8239-70-03 11:43:00





             Test Item    Value        Reference Range Interpretation Comments

 

             Platelet (test code = Platelet) 321          133-450               

    



[facility] DallasCjcjqbxYNVECIDJLZ3301-20-11 11:43:00





             Test Item    Value        Reference Range Interpretation Comments

 

             MPV (test code = MPV) 8.6          7.4-10.4                  



[facility] DallasDkxtkzbJAWIHHZAQT9843-23-32 11:43:00





             Test Item    Value        Reference Range Interpretation Comments

 

             Segs (test code = Segs) 92.0         45.0-75.0                 



[facility] DallasGkgqdgbMULFIHWJEZ8679-49-84 11:43:00





             Test Item    Value        Reference Range Interpretation Comments

 

             Lymphocytes (test code = Lymphocytes) 5.2          20.0-40.0       

          



[facility] DallasIpkgjzeKHWAIWHPZQ6370-07-81 11:43:00





             Test Item    Value        Reference Range Interpretation Comments

 

             Monocytes (test code = Monocytes) 1.6          2.0-12.0            

      



[facility] DallasAczgzkjWZFTCKTXOO5888-39-17 11:43:00





             Test Item    Value        Reference Range Interpretation Comments

 

             Basophils (test code = 1.2          See_Comment                [Aut

omated message] The



             Basophils)                                          system which ge

nerated



                                                                 this result tra

nsmitted



                                                                 reference range

: <=1.0.



                                                                 The reference r

zhen was



                                                                 not used to int

erpret



                                                                 this result as



                                                                 normal/abnormal

.



[place] 077282-04-11 11:43:00





             Test Item    Value        Reference Range Interpretation Comments

 

             Neutrophils # (test code = Neutrophils 9.4          1.5-8.1        

           



             #)                                                  



[facility] DallasMbletxaYGLWTUGBRU1706-00-12 11:43:00





             Test Item    Value        Reference Range Interpretation Comments

 

             Lymphocytes # (test code = Lymphocytes 0.5          1.0-5.5        

           



             #)                                                  



[place] 077282-04-11 11:43:00





             Test Item    Value        Reference Range Interpretation Comments

 

             Monocytes # (test code 0.2          See_Comment                [Aut

omated message] The



             = Monocytes #)                                        system which 

generated



                                                                 this result tra

nsmitted



                                                                 reference range

: <=0.8.



                                                                 The reference r

zhen was



                                                                 not used to int

erpret



                                                                 this result as



                                                                 normal/abnormal

.



[facility] DallasDomcbpuEVQERHRUBU8940-36-96 11:43:00





             Test Item    Value        Reference Range Interpretation Comments

 

             Basophils # (test code 0.1          See_Comment                [Aut

omated message] The



             = Basophils #)                                        system which 

generated



                                                                 this result tra

nsmitted



                                                                 reference range

: <=0.2.



                                                                 The reference r

zhen was



                                                                 not used to int

erpret



                                                                 this result as



                                                                 normal/abnormal

.



[facility]2022-04-11 11:43:00





             Test Item    Value        Reference Range Interpretation Comments

 

             Glucose Lvl (test code = Glucose Lvl) 418          70-99           

          



[facility]2022-04-11 11:43:00





             Test Item    Value        Reference Range Interpretation Comments

 

             BUN (test code = BUN) 22           7-22                      



[place] 017232-04-11 11:43:00





             Test Item    Value        Reference Range Interpretation Comments

 

             Creatinine Lvl (test code = Creatinine 1.10         0.50-1.40      

           



             Lvl)                                                



[place] 017232-04-11 11:43:00





             Test Item    Value        Reference Range Interpretation Comments

 

             Sodium Lvl (test code = Sodium Lvl) 138          135-145           

        



[place] 017232-04-11 11:43:00





             Test Item    Value        Reference Range Interpretation Comments

 

             Potassium Lvl (test code = Potassium 4.9          3.5-5.1          

         



             Lvl)                                                



[place] 017232-04-11 11:43:00





             Test Item    Value        Reference Range Interpretation Comments

 

             Chloride Lvl (test code = Chloride Lvl) 113                  

            



[facility]2022-04-11 11:43:00





             Test Item    Value        Reference Range Interpretation Comments

 

             CO2 (test code = CO2) 16           24-32                     



[place] 017232-04-11 11:43:00





             Test Item    Value        Reference Range Interpretation Comments

 

             AGAP (test code = AGAP) 13.9         10.0-20.0                 



[place] 017232-04-11 11:43:00





             Test Item    Value        Reference Range Interpretation Comments

 

             Calcium Lvl (test code = Calcium Lvl) 9.0          8.5-10.5        

          



[facility]2022-04-11 11:43:00





             Test Item    Value        Reference Range Interpretation Comments

 

             eGFR (test code = eGFR) 86                                     



[facility] DallasUyecpwfQRZAYXFSQN3341-09-77 11:43:00





             Test Item    Value        Reference Range Interpretation Comments

 

             WBC (test code = WBC) 10.2         3.7-10.4                  



[place] 077282-04-11 11:43:00





             Test Item    Value        Reference Range Interpretation Comments

 

             RBC (test code = RBC) 5.46         4.70-6.10                 



[place] 077282-04-11 11:43:00





             Test Item    Value        Reference Range Interpretation Comments

 

             Hgb (test code = Hgb) 16.3         14.0-18.0                 



[place] 077282-04-11 11:43:00





             Test Item    Value        Reference Range Interpretation Comments

 

             Hct (test code = Hct) 50.0         42.0-54.0                 



[place] 56302-04-11 11:43:00





             Test Item    Value        Reference Range Interpretation Comments

 

             MCV (test code = MCV) 91.4         80.0-94.0                 



[place] 56302-04-11 11:43:00





             Test Item    Value        Reference Range Interpretation Comments

 

             MCH (test code = MCH) 29.9 pg      27.0-31.0                 



[place] 077282-04-11 11:43:00





             Test Item    Value        Reference Range Interpretation Comments

 

             MCHC (test code = MCHC) 32.7         32.0-36.0                 



[place] 077282-04-11 11:43:00





             Test Item    Value        Reference Range Interpretation Comments

 

             RDW (test code = RDW) 15.7         11.5-14.5                 



[place] 077282-04-11 11:43:00





             Test Item    Value        Reference Range Interpretation Comments

 

             Platelet (test code = Platelet) 321          133-450               

    



[place] 077282-04-11 11:43:00





             Test Item    Value        Reference Range Interpretation Comments

 

             MPV (test code = MPV) 8.6          7.4-10.4                  



[facility] DallasDjjswmcBRWVJTMCDW4176-64-39 11:43:00





             Test Item    Value        Reference Range Interpretation Comments

 

             Segs (test code = Segs) 92.0         45.0-75.0                 



[place] 077282-04-11 11:43:00





             Test Item    Value        Reference Range Interpretation Comments

 

             Lymphocytes (test code = Lymphocytes) 5.2          20.0-40.0       

          



[place] [address]-04-11 11:43:00





             Test Item    Value        Reference Range Interpretation Comments

 

             Monocytes (test code = Monocytes) 1.6          2.0-12.0            

      



[place] 077282-04-11 11:43:00





             Test Item    Value        Reference Range Interpretation Comments

 

             Basophils (test code = 1.2          See_Comment                [Aut

omated message] The



             Basophils)                                          system which ge

nerated



                                                                 this result tra

nsmitted



                                                                 reference range

: <=1.0.



                                                                 The reference r

zhen was



                                                                 not used to int

erpret



                                                                 this result as



                                                                 normal/abnormal

.



[facility]





             Test Item    Value        Reference Range Interpretation Comments

 

             Neutrophils # (test code = Neutrophils 9.4          1.5-8.1        

           



             #)                                                  



[place] 077282-04-11 11:43:00





             Test Item    Value        Reference Range Interpretation Comments

 

             Lymphocytes # (test code = Lymphocytes 0.5          1.0-5.5        

           



             #)                                                  



[place] 077282-04-11 11:43:00





             Test Item    Value        Reference Range Interpretation Comments

 

             Monocytes # (test code 0.2          See_Comment                [Aut

omated message] The



             = Monocytes #)                                        system which 

generated



                                                                 this result tra

nsmitted



                                                                 reference range

: <=0.8.



                                                                 The reference r

zhen was



                                                                 not used to int

erpret



                                                                 this result as



                                                                 normal/abnormal

.



[place] 077282-04-11 11:43:00





             Test Item    Value        Reference Range Interpretation Comments

 

             Basophils # (test code 0.1          See_Comment                [Aut

omated message] The



             = Basophils #)                                        system which 

generated



                                                                 this result tra

nsmitted



                                                                 reference range

: <=0.2.



                                                                 The reference r

zhen was



                                                                 not used to int

erpret



                                                                 this result as



                                                                 normal/abnormal

.



[facility]2022-04-11 11:43:00





             Test Item    Value        Reference Range Interpretation Comments

 

             Glucose Lvl (test code = Glucose Lvl) 418          70-99           

          



[place] 017232-04-11 11:43:00





             Test Item    Value        Reference Range Interpretation Comments

 

             BUN (test code = BUN) 22           7-22                      



[place] 017232-04-11 11:43:00





             Test Item    Value        Reference Range Interpretation Comments

 

             Creatinine Lvl (test code = Creatinine 1.10         0.50-1.40      

           



             Lvl)                                                



[facility]2022-04-11 11:43:00





             Test Item    Value        Reference Range Interpretation Comments

 

             Sodium Lvl (test code = Sodium Lvl) 138          135-145           

        



[place] 017232-04-11 11:43:00





             Test Item    Value        Reference Range Interpretation Comments

 

             Potassium Lvl (test code = Potassium 4.9          3.5-5.1          

         



             Lvl)                                                



[facility]2022-04-11 11:43:00





             Test Item    Value        Reference Range Interpretation Comments

 

             Chloride Lvl (test code = Chloride Lvl) 113                  

            



[facility]2022-04-11 11:43:00





             Test Item    Value        Reference Range Interpretation Comments

 

             CO2 (test code = CO2) 16           24-32                     



[place] 017232-04-11 11:43:00





             Test Item    Value        Reference Range Interpretation Comments

 

             AGAP (test code = AGAP) 13.9         10.0-20.0                 



[place] 017232-04-11 11:43:00





             Test Item    Value        Reference Range Interpretation Comments

 

             Calcium Lvl (test code = Calcium Lvl) 9.0          8.5-10.5        

          



[place] 017232-04-11 11:43:00





             Test Item    Value        Reference Range Interpretation Comments

 

             eGFR (test code = eGFR) 86                                     



[facility] DallasAatwtuoECIOMAWQRZ8156-39-91 11:43:00





             Test Item    Value        Reference Range Interpretation Comments

 

             WBC (test code = WBC) 10.2         3.7-10.4                  



[facility] DallasCouvwphDNJQVAGDMW6282-58-69 11:43:00





             Test Item    Value        Reference Range Interpretation Comments

 

             RBC (test code = RBC) 5.46         4.70-6.10                 



[place] [address]-04-11 11:43:00





             Test Item    Value        Reference Range Interpretation Comments

 

             Hgb (test code = Hgb) 16.3         14.0-18.0                 



[facility] DallasIbzoybmZADWJCEVZQ7917-88-66 11:43:00





             Test Item    Value        Reference Range Interpretation Comments

 

             Hct (test code = Hct) 50.0         42.0-54.0                 



[facility] DallasSpsdtquCOPHWMGNUK0180-04-05 11:43:00





             Test Item    Value        Reference Range Interpretation Comments

 

             MCV (test code = MCV) 91.4         80.0-94.0                 



[facility] DallasSnfjbtgQPXSUDCYNW8659-32-67 11:43:00





             Test Item    Value        Reference Range Interpretation Comments

 

             MCH (test code = MCH) 29.9 pg      27.0-31.0                 



[facility] DallasYxiokdpKSHRMYYWYR6641-68-96 11:43:00





             Test Item    Value        Reference Range Interpretation Comments

 

             MCHC (test code = MCHC) 32.7         32.0-36.0                 



[facility] DallasLapfwarAQZQSQHJJA5164-15-03 11:43:00





             Test Item    Value        Reference Range Interpretation Comments

 

             RDW (test code = RDW) 15.7         11.5-14.5                 



[facility] DallasWjoowpeUMZWWJUZEA7090-05-93 11:43:00





             Test Item    Value        Reference Range Interpretation Comments

 

             Platelet (test code = Platelet) 321          133-450               

    



[facility] DallasCsuvrveGDLJQIHDLY0321-81-52 11:43:00





             Test Item    Value        Reference Range Interpretation Comments

 

             MPV (test code = MPV) 8.6          7.4-10.4                  



[facility] DallasSbmmamaYMTVAGCTLV0162-57-05 11:43:00





             Test Item    Value        Reference Range Interpretation Comments

 

             Segs (test code = Segs) 92.0         45.0-75.0                 



[facility] DallasLdjtoljRIDLXQYTJO1162-59-16 11:43:00





             Test Item    Value        Reference Range Interpretation Comments

 

             Lymphocytes (test code = Lymphocytes) 5.2          20.0-40.0       

          



[place] 077282-04-11 11:43:00





             Test Item    Value        Reference Range Interpretation Comments

 

             Monocytes (test code = Monocytes) 1.6          2.0-12.0            

      



[facility] DallasBveekzsJQEJHLKSCO8363-25-46 11:43:00





             Test Item    Value        Reference Range Interpretation Comments

 

             Basophils (test code = 1.2          See_Comment                [Aut

omated message] The



             Basophils)                                          system which ge

nerated



                                                                 this result tra

nsmitted



                                                                 reference range

: <=1.0.



                                                                 The reference r

zhen was



                                                                 not used to int

erpret



                                                                 this result as



                                                                 normal/abnormal

.



[place] 077282-04-11 11:43:00





             Test Item    Value        Reference Range Interpretation Comments

 

             Neutrophils # (test code = Neutrophils 9.4          1.5-8.1        

           



             #)                                                  



[facility] DallasGlfmglzXECRGUZGAN4147-83-61 11:43:00





             Test Item    Value        Reference Range Interpretation Comments

 

             Lymphocytes # (test code = Lymphocytes 0.5          1.0-5.5        

           



             #)                                                  



[place] 077282-04-11 11:43:00





             Test Item    Value        Reference Range Interpretation Comments

 

             Monocytes # (test code 0.2          See_Comment                [Aut

omated message] The



             = Monocytes #)                                        system which 

generated



                                                                 this result tra

nsmitted



                                                                 reference range

: <=0.8.



                                                                 The reference r

zhen was



                                                                 not used to int

erpret



                                                                 this result as



                                                                 normal/abnormal

.



[place] 077282-04-11 11:43:00





             Test Item    Value        Reference Range Interpretation Comments

 

             Basophils # (test code 0.1          See_Comment                [Aut

omated message] The



             = Basophils #)                                        system which 

generated



                                                                 this result tra

nsmitted



                                                                 reference range

: <=0.2.



                                                                 The reference r

zhen was



                                                                 not used to int

erpret



                                                                 this result as



                                                                 normal/abnormal

.



[place] 017042-04-09 09:18:00





             Test Item    Value        Reference Range Interpretation Comments

 

             Coronavirus (COVID-19) Not Detected (22                        

   



             ZEYAD (test code = 4:18 AM)                               



             Coronavirus (COVID-19)                                        



             ZEYAD)                                                



[place] [address]-04-09 09:18:00





             Test Item    Value        Reference Range Interpretation Comments

 

             Coronavirus (COVID-19) Not Detected (22                        

   



             ZEYAD (test code = 4:18 AM)                               



             Coronavirus (COVID-19)                                        



             ZEYAD)                                                



[place] [address]-04-09 09:18:00





             Test Item    Value        Reference Range Interpretation Comments

 

             Coronavirus (COVID-19) Not Detected (22                        

   



             ZEYAD (test code = 4:18 AM)                               



             Coronavirus (COVID-19)                                        



             ZEYAD)                                                



[place] [address]-04-09 09:18:00





             Test Item    Value        Reference Range Interpretation Comments

 

             Coronavirus (COVID-19) Not Detected (22                        

   



             ZEYAD (test code = 4:18 AM)                               



             Coronavirus (COVID-19)                                        



             ZEYAD)                                                



[facility]US9764[facility]





             Test Item    Value        Reference Range Interpretation Comments

 

             Coronavirus (COVID-19) Not Detected (22                        

   



             ZEYAD (test code = 4:18 AM)                               



             Coronavirus (COVID-19)                                        



             ZEYAD)                                                



[facility] CenterJcweyboTVHOJYEMBJ7378-89-82 09:18:00





             Test Item    Value        Reference Range Interpretation Comments

 

             Coronavirus (COVID-19) Not Detected (22                        

   



             ZEYAD (test code = 4:18 AM)                               



             Coronavirus (COVID-19)                                        



             ZEYAD)                                                



[facility] CenterTzwoppuRGVEZJSTPM6935-66-36 09:18:00





             Test Item    Value        Reference Range Interpretation Comments

 

             Coronavirus (COVID-19) Not Detected (22                        

   



             ZEYAD (test code = 4:18 AM)                               



             Coronavirus (COVID-19)                                        



             ZEYAD)                                                



[facility]PT:ISOLATE:ORDQN:[id number] 08:19:00





             Test Item    Value        Reference Range Interpretation Comments

 

             Culture: Urine (test >100,000 CFU/mL                           



             code = Culture: Providencia stuartii                           



             Urine)                                              



[facility] HospitalAX[facility]USC:PT:ISOLATE:ORDQN:[id number] 08:19:00





             Test Item    Value        Reference Range Interpretation Comments

 

             Providencia stuartii Providencia stuartii                          

 



             (test code = Providencia                                        



             stuartii)                                           



[facility] AND [id number] 08:19:00





             Test Item    Value        Reference Range Interpretation Comments

 

             UA Color (test code = Yellow *NA*(22 3:19                      

     



             UA Color)    AM)                                    



[facility] [id number] 08:19:00





             Test Item    Value        Reference Range Interpretation Comments

 

             UA Turbidity (test code Marked *ABN*(22                        

   



             = UA Turbidity) 3:19 AM)                               



[facility] [id number] 08:19:00





             Test Item    Value        Reference Range Interpretation Comments

 

             UA Spec Grav (test code = UA Spec 1.013 1                          

      



             Grav)                                               



[facility] [id number] 08:19:00





             Test Item    Value        Reference Range Interpretation Comments

 

             UA pH (test code = UA pH) 5.0 1        5.0-8.0                   



[facility] [id number] 08:19:00





             Test Item    Value        Reference Range Interpretation Comments

 

             UA Protein (test code = UA Negative mg/dL                          

 



             Protein)                                            



[facility] AND [id number] 08:19:00





             Test Item    Value        Reference Range Interpretation Comments

 

             UA Glucose (test code = UA Negative mg/dL                          

 



             Glucose)                                            



[facility] AND [id number] 08:19:00





             Test Item    Value        Reference Range Interpretation Comments

 

             UA Ketones (test code = UA Negative mg/dL                          

 



             Ketones)                                            



[facility] AND [id number] 08:19:00





             Test Item    Value        Reference Range Interpretation Comments

 

             UA Bili (test code = Negative *NA*(22                          

 



             UA Bili)     3:19 AM)                               



[facility] AND [id number] 08:19:00





             Test Item    Value        Reference Range Interpretation Comments

 

             UA Blood (test code = Small *ABN*(22                           



             UA Blood)    3:19 AM)                               



[facility] AND [id number] 08:19:00





             Test Item    Value        Reference Range Interpretation Comments

 

             UA Urobilinogen (test code = UA no gt        0.1-1.0               

    



             Urobilinogen)                                        



[facility] AND [id number] 08:19:00





             Test Item    Value        Reference Range Interpretation Comments

 

             UA Nitrite (test code Positive *ABN*(22                        

   



             = UA Nitrite) 3:19 AM)                               



[facility] AND [id number] 08:19:00





             Test Item    Value        Reference Range Interpretation Comments

 

             UA Leuk Est (test code Large *ABN*(22 3:19                     

      



             = UA Leuk Est) AM)                                    



[facility] AND [id number] 08:19:00





             Test Item    Value        Reference Range Interpretation Comments

 

             UA WBC (test code = no gt        See_Comment                [Automa

huma message] The



             UA WBC)                                             system which ge

nerated this



                                                                 result transmit

huma



                                                                 reference range

: <=5. The



                                                                 reference range

 was not



                                                                 used to interpr

et this



                                                                 result as zayda

l/abnormal.



[facility] AND [id number] 08:19:00





             Test Item    Value        Reference Range Interpretation Comments

 

             UA RBC (test code = 8            See_Comment                [Automa

huma message] The



             UA RBC)                                             system which ge

nerated this



                                                                 result transmit

huma



                                                                 reference range

: <=2. The



                                                                 reference range

 was not



                                                                 used to interpr

et this



                                                                 result as zayda

l/abnormal.



[facility] [id number] 08:19:00





             Test Item    Value        Reference Range Interpretation Comments

 

             UA Bacteria (test code = UA Occasional /HPF                        

   



             Bacteria)                                           



[facility] [id number] 08:19:00





             Test Item    Value        Reference Range Interpretation Comments

 

             UA Mucus (test code = UA Mucus) Few /LPF                           

    



[facility] [id number] 08:19:00





             Test Item    Value        Reference Range Interpretation Comments

 

             UA Amorph Ivonne (test code = Occasional /HPF                        

   



             UA Amorph Ivonne)                                        



[facility] [id number] 08:19:00





             Test Item    Value        Reference Range Interpretation Comments

 

             UA Sq Epi (test code = UA Sq Epi) None Seen                        

      



[facility] [id number] 08:19:00





             Test Item    Value        Reference Range Interpretation Comments

 

             Culture: Urine (test Holding For Better                           



             code = Culture: Urine) Growth                                 



[facility]SUSC:PT:ISOLATE:ORDQN:[id number] 08:19:00





             Test Item    Value        Reference Range Interpretation Comments

 

             Culture: Urine (test >100,000 CFU/mL                           



             code = Culture: Providencia stuartii                           



             Urine)                                              



[facility]PT:ISOLATE:ORDQN:[id number] 08:19:00





             Test Item    Value        Reference Range Interpretation Comments

 

             Providencia stuartii Providencia stuartii                          

 



             (test code = Providencia                                        



             stuartii)                                           



[facility] [id number] 08:19:00





             Test Item    Value        Reference Range Interpretation Comments

 

             UA Color (test code = Yellow *NA*(22 3:19                      

     



             UA Color)    AM)                                    



[facility] [id number] 08:19:00





             Test Item    Value        Reference Range Interpretation Comments

 

             UA Turbidity (test code Marked *ABN*(22                        

   



             = UA Turbidity) 3:19 AM)                               



[facility] [id number] 08:19:00





             Test Item    Value        Reference Range Interpretation Comments

 

             UA Spec Grav (test code = UA Spec 1.013 1                          

      



             Grav)                                               



[facility] [id number] 08:19:00





             Test Item    Value        Reference Range Interpretation Comments

 

             UA pH (test code = UA pH) 5.0 1        5.0-8.0                   



[facility] AND [id number] 08:19:00





             Test Item    Value        Reference Range Interpretation Comments

 

             UA Protein (test code = UA Negative mg/dL                          

 



             Protein)                                            



[facility] AND [id number] 08:19:00





             Test Item    Value        Reference Range Interpretation Comments

 

             UA Glucose (test code = UA Negative mg/dL                          

 



             Glucose)                                            



[facility] AND [id number] 08:19:00





             Test Item    Value        Reference Range Interpretation Comments

 

             UA Ketones (test code = UA Negative mg/dL                          

 



             Ketones)                                            



[facility] AND [id number] 08:19:00





             Test Item    Value        Reference Range Interpretation Comments

 

             UA Bili (test code = Negative *NA*(22                          

 



             UA Bili)     3:19 AM)                               



[facility] AND [id number] 08:19:00





             Test Item    Value        Reference Range Interpretation Comments

 

             UA Blood (test code = Small *ABN*(22                           



             UA Blood)    3:19 AM)                               



[facility] AND [id number] 08:19:00





             Test Item    Value        Reference Range Interpretation Comments

 

             UA Urobilinogen (test code = UA no gt        0.1-1.0               

    



             Urobilinogen)                                        



[facility] AND [id number] 08:19:00





             Test Item    Value        Reference Range Interpretation Comments

 

             UA Nitrite (test code Positive *ABN*(22                        

   



             = UA Nitrite) 3:19 AM)                               



[facility] AND [id number] 08:19:00





             Test Item    Value        Reference Range Interpretation Comments

 

             UA Leuk Est (test code Large *ABN*(22 3:19                     

      



             = UA Leuk Est) AM)                                    



[facility] AND [id number] 08:19:00





             Test Item    Value        Reference Range Interpretation Comments

 

             UA WBC (test code = no gt        See_Comment                [Automa

huma message] The



             UA WBC)                                             system which ge

nerated this



                                                                 result transmit

huma



                                                                 reference range

: <=5. The



                                                                 reference range

 was not



                                                                 used to interpr

et this



                                                                 result as zayda

l/abnormal.



[facility] AND [id number] 08:19:00





             Test Item    Value        Reference Range Interpretation Comments

 

             UA RBC (test code = 8            See_Comment                [Automa

huma message] The



             UA RBC)                                             system which ge

nerated this



                                                                 result transmit

huma



                                                                 reference range

: <=2. The



                                                                 reference range

 was not



                                                                 used to interpr

et this



                                                                 result as zayda

l/abnormal.



[facility] [id number] 08:19:00





             Test Item    Value        Reference Range Interpretation Comments

 

             UA Bacteria (test code = UA Occasional /HPF                        

   



             Bacteria)                                           



[facility] [id number] 08:19:00





             Test Item    Value        Reference Range Interpretation Comments

 

             UA Mucus (test code = UA Mucus) Few /LPF                           

    



[facility] [id number] 08:19:00





             Test Item    Value        Reference Range Interpretation Comments

 

             UA Amorph Ivonne (test code = Occasional /HPF                        

   



             UA Amorph Ivonne)                                        



[facility] [id number] 08:19:00





             Test Item    Value        Reference Range Interpretation Comments

 

             UA Sq Epi (test code = UA Sq Epi) None Seen                        

      



[facility] [id number]





             Test Item    Value        Reference Range Interpretation Comments

 

             Culture: Urine (test Holding For Better                           



             code = Culture: Urine) Growth                                 



[facility]TRIAXONE[facility]SUSC:PT:ISOLATE:ORDQN:[id number] 08:19:00





             Test Item    Value        Reference Range Interpretation Comments

 

             Culture: Urine (test >100,000 CFU/mL                           



             code = Culture: Providencia stuartii                           



             Urine)                                              



[facility] EnriqueFTRIAXONE:SUSC:PT:ISOLATE:ORDQN:[id number] 08:19:00





             Test Item    Value        Reference Range Interpretation Comments

 

             Providencia stuartii Providencia stuartii                          

 



             (test code = Providencia                                        



             stuartii)                                           



[facility] [id number] 08:19:00





             Test Item    Value        Reference Range Interpretation Comments

 

             UA Color (test code = Yellow *NA*(22 3:19                      

     



             UA Color)    AM)                                    



[facility] [id number] 08:19:00





             Test Item    Value        Reference Range Interpretation Comments

 

             UA Turbidity (test code Marked *ABN*(22                        

   



             = UA Turbidity) 3:19 AM)                               



[facility] [id number] 08:19:00





             Test Item    Value        Reference Range Interpretation Comments

 

             UA Spec Grav (test code = UA Spec 1.013 1                          

      



             Grav)                                               



[facility] [id number] 08:19:00





             Test Item    Value        Reference Range Interpretation Comments

 

             UA pH (test code = UA pH) 5.0 1        5.0-8.0                   



[facility] AND [id number] 08:19:00





             Test Item    Value        Reference Range Interpretation Comments

 

             UA Protein (test code = UA Negative mg/dL                          

 



             Protein)                                            



[facility] AND [id number] 08:19:00





             Test Item    Value        Reference Range Interpretation Comments

 

             UA Glucose (test code = UA Negative mg/dL                          

 



             Glucose)                                            



[facility] AND [id number] 08:19:00





             Test Item    Value        Reference Range Interpretation Comments

 

             UA Ketones (test code = UA Negative mg/dL                          

 



             Ketones)                                            



[facility] AND [id number] 08:19:00





             Test Item    Value        Reference Range Interpretation Comments

 

             UA Bili (test code = Negative *NA*(22                          

 



             UA Bili)     3:19 AM)                               



[facility] AND [id number] 08:19:00





             Test Item    Value        Reference Range Interpretation Comments

 

             UA Blood (test code = Small *ABN*(22                           



             UA Blood)    3:19 AM)                               



[facility] AND [id number] 08:19:00





             Test Item    Value        Reference Range Interpretation Comments

 

             UA Urobilinogen (test code = UA no gt        0.1-1.0               

    



             Urobilinogen)                                        



[facility] AND [id number] 08:19:00





             Test Item    Value        Reference Range Interpretation Comments

 

             UA Nitrite (test code Positive *ABN*(22                        

   



             = UA Nitrite) 3:19 AM)                               



[facility] AND [id number] 08:19:00





             Test Item    Value        Reference Range Interpretation Comments

 

             UA Leuk Est (test code Large *ABN*(22 3:19                     

      



             = UA Leuk Est) AM)                                    



[facility] AND [id number] 08:19:00





             Test Item    Value        Reference Range Interpretation Comments

 

             UA WBC (test code = no gt        See_Comment                [Automa

huma message] The



             UA WBC)                                             system which ge

nerated this



                                                                 result transmit

huma



                                                                 reference range

: <=5. The



                                                                 reference range

 was not



                                                                 used to interpr

et this



                                                                 result as zayda

l/abnormal.



[facility] AND [id number] 08:19:00





             Test Item    Value        Reference Range Interpretation Comments

 

             UA RBC (test code = 8            See_Comment                [Automa

huma message] The



             UA RBC)                                             system which ge

nerated this



                                                                 result transmit

huma



                                                                 reference range

: <=2. The



                                                                 reference range

 was not



                                                                 used to interpr

et this



                                                                 result as zayda

l/abnormal.



[facility] [id number] 08:19:00





             Test Item    Value        Reference Range Interpretation Comments

 

             UA Bacteria (test code = UA Occasional /HPF                        

   



             Bacteria)                                           



[facility] [id number] 08:19:00





             Test Item    Value        Reference Range Interpretation Comments

 

             UA Mucus (test code = UA Mucus) Few /LPF                           

    



[facility] [id number] 08:19:00





             Test Item    Value        Reference Range Interpretation Comments

 

             UA Amorph Ivonne (test code = Occasional /HPF                        

   



             UA Amorph Ivonne)                                        



[facility] [id number] 08:19:00





             Test Item    Value        Reference Range Interpretation Comments

 

             UA Sq Epi (test code = UA Sq Epi) None Seen                        

      



[facility] [id number] 08:19:00





             Test Item    Value        Reference Range Interpretation Comments

 

             Culture: Urine (test Holding For Better                           



             code = Culture: Urine) Growth                                 



[facility]riqueFTRIAXONE:SUSC:PT:ISOLATE:ORDQN:[id number] 08:19:00





             Test Item    Value        Reference Range Interpretation Comments

 

             Culture: Urine (test >100,000 CFU/mL                           



             code = Culture: Providencia stuartii                           



             Urine)                                              



[facility]riqueFTRIAXONE:SUSC:PT:ISOLATE:ORDQN:[id number] 08:19:00





             Test Item    Value        Reference Range Interpretation Comments

 

             Providencia stuartii Providencia stuartii                          

 



             (test code = Providencia                                        



             stuartii)                                           



[facility] [id number] 08:19:00





             Test Item    Value        Reference Range Interpretation Comments

 

             UA Color (test code = Yellow *NA*(22 3:19                      

     



             UA Color)    AM)                                    



[facility] [id number] 08:19:00





             Test Item    Value        Reference Range Interpretation Comments

 

             UA Turbidity (test code Marked *ABN*(22                        

   



             = UA Turbidity) 3:19 AM)                               



[facility] [id number] 08:19:00





             Test Item    Value        Reference Range Interpretation Comments

 

             UA Spec Grav (test code = UA Spec 1.013 1                          

      



             Grav)                                               



[facility] [id number] 08:19:00





             Test Item    Value        Reference Range Interpretation Comments

 

             UA pH (test code = UA pH) 5.0 1        5.0-8.0                   



[facility] AND [id number] 08:19:00





             Test Item    Value        Reference Range Interpretation Comments

 

             UA Protein (test code = UA Negative mg/dL                          

 



             Protein)                                            



[facility] AND [id number] 08:19:00





             Test Item    Value        Reference Range Interpretation Comments

 

             UA Glucose (test code = UA Negative mg/dL                          

 



             Glucose)                                            



[facility] AND [id number] 08:19:00





             Test Item    Value        Reference Range Interpretation Comments

 

             UA Ketones (test code = UA Negative mg/dL                          

 



             Ketones)                                            



[facility] AND [id number] 08:19:00





             Test Item    Value        Reference Range Interpretation Comments

 

             UA Bili (test code = Negative *NA*(22                          

 



             UA Bili)     3:19 AM)                               



[facility] AND [id number] 08:19:00





             Test Item    Value        Reference Range Interpretation Comments

 

             UA Blood (test code = Small *ABN*(22                           



             UA Blood)    3:19 AM)                               



[facility] AND [id number] 08:19:00





             Test Item    Value        Reference Range Interpretation Comments

 

             UA Urobilinogen (test code = UA no gt        0.1-1.0               

    



             Urobilinogen)                                        



[facility] AND [id number] 08:19:00





             Test Item    Value        Reference Range Interpretation Comments

 

             UA Nitrite (test code Positive *ABN*(22                        

   



             = UA Nitrite) 3:19 AM)                               



[facility] AND [id number] 08:19:00





             Test Item    Value        Reference Range Interpretation Comments

 

             UA Leuk Est (test code Large *ABN*(22 3:19                     

      



             = UA Leuk Est) AM)                                    



[facility] AND [id number] 08:19:00





             Test Item    Value        Reference Range Interpretation Comments

 

             UA WBC (test code = no gt        See_Comment                [Automa

huma message] The



             UA WBC)                                             system which ge

nerated this



                                                                 result transmit

huma



                                                                 reference range

: <=5. The



                                                                 reference range

 was not



                                                                 used to interpr

et this



                                                                 result as zayda

l/abnormal.



[facility] AND [id number] 08:19:00





             Test Item    Value        Reference Range Interpretation Comments

 

             UA RBC (test code = 8            See_Comment                [Automa

huma message] The



             UA RBC)                                             system which ge

nerated this



                                                                 result transmit

huma



                                                                 reference range

: <=2. The



                                                                 reference range

 was not



                                                                 used to interpr

et this



                                                                 result as zayda

l/abnormal.



[facility] AND [id number] 08:19:00





             Test Item    Value        Reference Range Interpretation Comments

 

             UA Bacteria (test code = UA Occasional /HPF                        

   



             Bacteria)                                           



[facility] FosterAXONE:SUSC:PT:ISOLATE:ORDQN:[id number] 08:19:00





             Test Item    Value        Reference Range Interpretation Comments

 

             Culture: Urine (test >100,000 CFU/mL                           



             code = Culture: Providencia stuartii                           



             Urine)                                              



[facility] FosterAXONE:SUSC:PT:ISOLATE:ORDQN:[id number] 08:19:00





             Test Item    Value        Reference Range Interpretation Comments

 

             Providencia stuartii Providencia stuartii                          

 



             (test code = Providencia                                        



             stuartii)                                           



[facility] AND [id number] 08:19:00





             Test Item    Value        Reference Range Interpretation Comments

 

             UA Color (test code = Yellow *NA*(22 3:19                      

     



             UA Color)    AM)                                    



[facility] AND [id number] 08:19:00





             Test Item    Value        Reference Range Interpretation Comments

 

             UA Turbidity (test code Marked *ABN*(22                        

   



             = UA Turbidity) 3:19 AM)                               



[facility] AND [id number] 08:19:00





             Test Item    Value        Reference Range Interpretation Comments

 

             UA Spec Grav (test code = UA Spec 1.013 1                          

      



             Grav)                                               



[facility] AND [id number] 08:19:00





             Test Item    Value        Reference Range Interpretation Comments

 

             UA pH (test code = UA pH) 5.0 1        5.0-8.0                   



[facility] AND [id number] 08:19:00





             Test Item    Value        Reference Range Interpretation Comments

 

             UA Protein (test code = UA Negative mg/dL                          

 



             Protein)                                            



[facility] AND [id number] 08:19:00





             Test Item    Value        Reference Range Interpretation Comments

 

             UA Glucose (test code = UA Negative mg/dL                          

 



             Glucose)                                            



[facility] AND [id number] 08:19:00





             Test Item    Value        Reference Range Interpretation Comments

 

             UA Mucus (test code = UA Mucus) Few /LPF                           

    



[facility] [id number] 08:19:00





             Test Item    Value        Reference Range Interpretation Comments

 

             UA Ketones (test code = UA Negative mg/dL                          

 



             Ketones)                                            



[facility] [id number] 08:19:00





             Test Item    Value        Reference Range Interpretation Comments

 

             UA Bili (test code = Negative *NA*(22                          

 



             UA Bili)     3:19 AM)                               



[facility] [id number] 08:19:00





             Test Item    Value        Reference Range Interpretation Comments

 

             UA Blood (test code = Small *ABN*(22                           



             UA Blood)    3:19 AM)                               



[facility] AND [id number] 08:19:00





             Test Item    Value        Reference Range Interpretation Comments

 

             UA Urobilinogen (test code = UA no gt        0.1-1.0               

    



             Urobilinogen)                                        



[facility] AND [id number] 08:19:00





             Test Item    Value        Reference Range Interpretation Comments

 

             UA Nitrite (test code Positive *ABN*(22                        

   



             = UA Nitrite) 3:19 AM)                               



[facility] AND [id number] 08:19:00





             Test Item    Value        Reference Range Interpretation Comments

 

             UA Leuk Est (test code Large *ABN*(22 3:19                     

      



             = UA Leuk Est) AM)                                    



[facility] AND [id number] 08:19:00





             Test Item    Value        Reference Range Interpretation Comments

 

             UA WBC (test code = no gt        See_Comment                [Automa

huma message] The



             UA WBC)                                             system which ge

nerated this



                                                                 result transmit

huma



                                                                 reference range

: <=5. The



                                                                 reference range

 was not



                                                                 used to interpr

et this



                                                                 result as zayda

l/abnormal.



[facility] [id number] 08:19:00





             Test Item    Value        Reference Range Interpretation Comments

 

             UA RBC (test code = 8            See_Comment                [Automa

huma message] The



             UA RBC)                                             system which ge

nerated this



                                                                 result transmit

huma



                                                                 reference range

: <=2. The



                                                                 reference range

 was not



                                                                 used to interpr

et this



                                                                 result as zayda

l/abnormal.



[facility] [id number] 08:19:00





             Test Item    Value        Reference Range Interpretation Comments

 

             UA Bacteria (test code = UA Occasional /HPF                        

   



             Bacteria)                                           



[facility] [id number] 08:19:00





             Test Item    Value        Reference Range Interpretation Comments

 

             UA Mucus (test code = UA Mucus) Few /LPF                           

    



[facility] [id number] 08:19:00





             Test Item    Value        Reference Range Interpretation Comments

 

             UA Amorph Ivonne (test code = Occasional /HPF                        

   



             UA Amorph Ivonne)                                        



[facility] [id number] 08:19:00





             Test Item    Value        Reference Range Interpretation Comments

 

             UA Amorph Ivonne (test code = Occasional /HPF                        

   



             UA Amorph Ivonne)                                        



[facility] [id number] 08:19:00





             Test Item    Value        Reference Range Interpretation Comments

 

             UA Sq Epi (test code = UA Sq Epi) None Seen                        

      



[facility] [id number] 08:19:00





             Test Item    Value        Reference Range Interpretation Comments

 

             Culture: Urine (test Holding For Better                           



             code = Culture: Urine) Growth                                 



[facility] [id number] 08:19:00





             Test Item    Value        Reference Range Interpretation Comments

 

             UA Sq Epi (test code = UA Sq Epi) None Seen                        

      



[facility] [id number] 08:19:00





             Test Item    Value        Reference Range Interpretation Comments

 

             Culture: Urine (test Holding For Better                           



             code = Culture: Urine) Growth                                 



[facility]SUSC:PT:ISOLATE:ORDQN:[id number] 08:19:00





             Test Item    Value        Reference Range Interpretation Comments

 

             Culture: Urine (test >100,000 CFU/mL                           



             code = Culture: Providencia stuartii                           



             Urine)                                              



[facility]USC:PT:ISOLATE:ORDQN:[id number] 08:19:00





             Test Item    Value        Reference Range Interpretation Comments

 

             Providencia stuartii Providencia stuartii                          

 



             (test code = Providencia                                        



             stuartii)                                           



[facility] [id number] 08:19:00





             Test Item    Value        Reference Range Interpretation Comments

 

             UA Color (test code = Yellow *NA*(22 3:19                      

     



             UA Color)    AM)                                    



[facility] [id number] 08:19:00





             Test Item    Value        Reference Range Interpretation Comments

 

             UA Turbidity (test code Marked *ABN*(22                        

   



             = UA Turbidity) 3:19 AM)                               



[facility] [id number] 08:19:00





             Test Item    Value        Reference Range Interpretation Comments

 

             UA Spec Grav (test code = UA Spec 1.013 1                          

      



             Grav)                                               



[facility] AND [id number] 08:19:00





             Test Item    Value        Reference Range Interpretation Comments

 

             UA pH (test code = UA pH) 5.0 1        5.0-8.0                   



[facility] AND [id number] 08:19:00





             Test Item    Value        Reference Range Interpretation Comments

 

             UA Protein (test code = UA Negative mg/dL                          

 



             Protein)                                            



[facility] AND [id number] 08:19:00





             Test Item    Value        Reference Range Interpretation Comments

 

             UA Glucose (test code = UA Negative mg/dL                          

 



             Glucose)                                            



[facility] AND [id number] 08:19:00





             Test Item    Value        Reference Range Interpretation Comments

 

             UA Ketones (test code = UA Negative mg/dL                          

 



             Ketones)                                            



[facility] AND [id number] 08:19:00





             Test Item    Value        Reference Range Interpretation Comments

 

             UA Bili (test code = Negative *NA*(22                          

 



             UA Bili)     3:19 AM)                               



[facility] AND [id number] 08:19:00





             Test Item    Value        Reference Range Interpretation Comments

 

             UA Blood (test code = Small *ABN*(22                           



             UA Blood)    3:19 AM)                               



[facility] AND [id number] 08:19:00





             Test Item    Value        Reference Range Interpretation Comments

 

             UA Urobilinogen (test code = UA no gt        0.1-1.0               

    



             Urobilinogen)                                        



[facility] AND [id number] 08:19:00





             Test Item    Value        Reference Range Interpretation Comments

 

             UA Nitrite (test code Positive *ABN*(22                        

   



             = UA Nitrite) 3:19 AM)                               



[facility] AND [id number] 08:19:00





             Test Item    Value        Reference Range Interpretation Comments

 

             UA Leuk Est (test code Large *ABN*(22 3:19                     

      



             = UA Leuk Est) AM)                                    



[facility] AND [id number] 08:19:00





             Test Item    Value        Reference Range Interpretation Comments

 

             UA WBC (test code = no gt        See_Comment                [Automa

huma message] The



             UA WBC)                                             system which ge

nerated this



                                                                 result transmit

huma



                                                                 reference range

: <=5. The



                                                                 reference range

 was not



                                                                 used to interpr

et this



                                                                 result as zayda

l/abnormal.



[facility] [id number] 08:19:00





             Test Item    Value        Reference Range Interpretation Comments

 

             UA RBC (test code = 8            See_Comment                [Automa

huma message] The



             UA RBC)                                             system which ge

nerated this



                                                                 result transmit

huma



                                                                 reference range

: <=2. The



                                                                 reference range

 was not



                                                                 used to interpr

et this



                                                                 result as zayda

l/abnormal.



[facility] [id number] 08:19:00





             Test Item    Value        Reference Range Interpretation Comments

 

             UA Bacteria (test code = UA Occasional /HPF                        

   



             Bacteria)                                           



[facility] [id number] 08:19:00





             Test Item    Value        Reference Range Interpretation Comments

 

             UA Mucus (test code = UA Mucus) Few /LPF                           

    



[facility] [id number] 08:19:00





             Test Item    Value        Reference Range Interpretation Comments

 

             UA Amorph Ivonne (test code = Occasional /HPF                        

   



             UA Amorph Ivonne)                                        



[facility] AND [id number] 08:19:00





             Test Item    Value        Reference Range Interpretation Comments

 

             UA Sq Epi (test code = UA Sq Epi) None Seen                        

      



[facility] [id number] 08:19:00





             Test Item    Value        Reference Range Interpretation Comments

 

             Culture: Urine (test Holding For Better                           



             code = Culture: Urine) Growth                                 



[facility] HospitalnguyenLISARIAXONE:SUSC:PT:ISOLATE:ORDQN:[id number] 08:19:00





             Test Item    Value        Reference Range Interpretation Comments

 

             Culture: Urine (test >100,000 CFU/mL                           



             code = Culture: Providencia stuartii                           



             Urine)                                              



[facility] JoseKARYNAFTRIAXONE:SUSC:PT:ISOLATE:ORDQN:[id number] 08:19:00





             Test Item    Value        Reference Range Interpretation Comments

 

             Providencia stuartii Providencia stuartii                          

 



             (test code = Providencia                                        



             stuartii)                                           



[facility] [id number] 08:19:00





             Test Item    Value        Reference Range Interpretation Comments

 

             UA Color (test code = Yellow *NA*(22 3:19                      

     



             UA Color)    AM)                                    



[facility] [id number] 08:19:00





             Test Item    Value        Reference Range Interpretation Comments

 

             UA Turbidity (test code Marked *ABN*(22                        

   



             = UA Turbidity) 3:19 AM)                               



[facility] AND [id number] 08:19:00





             Test Item    Value        Reference Range Interpretation Comments

 

             UA Spec Grav (test code = UA Spec 1.013 1                          

      



             Grav)                                               



[facility] AND [id number] 08:19:00





             Test Item    Value        Reference Range Interpretation Comments

 

             UA pH (test code = UA pH) 5.0 1        5.0-8.0                   



[facility] AND [id number] 08:19:00





             Test Item    Value        Reference Range Interpretation Comments

 

             UA Protein (test code = UA Negative mg/dL                          

 



             Protein)                                            



[facility] AND [id number] 08:19:00





             Test Item    Value        Reference Range Interpretation Comments

 

             UA Glucose (test code = UA Negative mg/dL                          

 



             Glucose)                                            



[facility] AND [id number] 08:19:00





             Test Item    Value        Reference Range Interpretation Comments

 

             UA Ketones (test code = UA Negative mg/dL                          

 



             Ketones)                                            



[facility] AND [id number] 08:19:00





             Test Item    Value        Reference Range Interpretation Comments

 

             UA Bili (test code = Negative *NA*(22                          

 



             UA Bili)     3:19 AM)                               



[facility] AND [id number] 08:19:00





             Test Item    Value        Reference Range Interpretation Comments

 

             UA Blood (test code = Small *ABN*(22                           



             UA Blood)    3:19 AM)                               



[facility] AND [id number] 08:19:00





             Test Item    Value        Reference Range Interpretation Comments

 

             UA Urobilinogen (test code = UA no gt        0.1-1.0               

    



             Urobilinogen)                                        



[facility] AND [id number] 08:19:00





             Test Item    Value        Reference Range Interpretation Comments

 

             UA Nitrite (test code Positive *ABN*(22                        

   



             = UA Nitrite) 3:19 AM)                               



[facility] AND [id number] 08:19:00





             Test Item    Value        Reference Range Interpretation Comments

 

             UA Leuk Est (test code Large *ABN*(22 3:19                     

      



             = UA Leuk Est) AM)                                    



[facility] AND [id number] 08:19:00





             Test Item    Value        Reference Range Interpretation Comments

 

             UA WBC (test code = no gt        See_Comment                [Automa

huma message] The



             UA WBC)                                             system which ge

nerated this



                                                                 result transmit

huma



                                                                 reference range

: <=5. The



                                                                 reference range

 was not



                                                                 used to interpr

et this



                                                                 result as zayda

l/abnormal.



[facility] [id number] 08:19:00





             Test Item    Value        Reference Range Interpretation Comments

 

             UA RBC (test code = 8            See_Comment                [Automa

huma message] The



             UA RBC)                                             system which ge

nerated this



                                                                 result transmit

huma



                                                                 reference range

: <=2. The



                                                                 reference range

 was not



                                                                 used to interpr

et this



                                                                 result as zayda

l/abnormal.



[facility] [id number] 08:19:00





             Test Item    Value        Reference Range Interpretation Comments

 

             UA Bacteria (test code = UA Occasional /HPF                        

   



             Bacteria)                                           



[facility] [id number] 08:19:00





             Test Item    Value        Reference Range Interpretation Comments

 

             UA Mucus (test code = UA Mucus) Few /LPF                           

    



[facility] [id number] 08:19:00





             Test Item    Value        Reference Range Interpretation Comments

 

             UA Amorph Ivonne (test code = Occasional /HPF                        

   



             UA Amorph Ivonne)                                        



[facility] AND [id number] 08:19:00





             Test Item    Value        Reference Range Interpretation Comments

 

             UA Sq Epi (test code = UA Sq Epi) None Seen                        

      



[facility] [id number] 08:19:00





             Test Item    Value        Reference Range Interpretation Comments

 

             Culture: Urine (test Holding For Better                           



             code = Culture: Urine) Growth                                 



[id number]





             Test Item    Value        Reference Range Interpretation Comments

 

             INR (test code = INR) 1.00 1       0.85-1.17                 



[id number]





             Test Item    Value        Reference Range Interpretation Comments

 

             PTT (test code = PTT) 31.5 s       22.9-35.8                 



[facility] H[id number] 00:20:00





             Test Item    Value        Reference Range Interpretation Comments

 

             Segs (test code = Segs) 68.7         45.0-75.0                 



[facility] [id number] 00:20:00





             Test Item    Value        Reference Range Interpretation Comments

 

             Lymphocytes (test code = Lymphocytes) 19.6         20.0-40.0       

          



[facility] [id number] 00:20:00





             Test Item    Value        Reference Range Interpretation Comments

 

             Monocytes (test code = Monocytes) 9.4          2.0-12.0            

      



[id number]





             Test Item    Value        Reference Range Interpretation Comments

 

             Eosinophils (test code = 1.4          See_Comment                [A

utomated message] The



             Eosinophils)                                        system which ge

nerated



                                                                 this result tra

nsmitted



                                                                 reference range

: <=4.0.



                                                                 The reference r

zhen was



                                                                 not used to int

erpret



                                                                 this result as



                                                                 normal/abnormal

.



[facility] [id number] 00:20:00





             Test Item    Value        Reference Range Interpretation Comments

 

             Basophils (test code = 0.9          See_Comment                [Aut

omated message] The



             Basophils)                                          system which ge

nerated



                                                                 this result tra

nsmitted



                                                                 reference range

: <=1.0.



                                                                 The reference r

zhen was



                                                                 not used to int

erpret



                                                                 this result as



                                                                 normal/abnormal

.



[facility] [id number] 00:20:00





             Test Item    Value        Reference Range Interpretation Comments

 

             Neutrophils # (test code = Neutrophils 6.9          1.5-8.1        

           



             #)                                                  



[facility] [id number] 00:20:00





             Test Item    Value        Reference Range Interpretation Comments

 

             Lymphocytes # (test code = Lymphocytes 2.0          1.0-5.5        

           



             #)                                                  



[facility] D[id number] 00:20:00





             Test Item    Value        Reference Range Interpretation Comments

 

             Monocytes # (test code 1.0          See_Comment                [Aut

omated message] The



             = Monocytes #)                                        system which 

generated



                                                                 this result tra

nsmitted



                                                                 reference range

: <=0.8.



                                                                 The reference r

zhen was



                                                                 not used to int

erpret



                                                                 this result as



                                                                 normal/abnormal

.



[facility] [id number] 00:20:00





             Test Item    Value        Reference Range Interpretation Comments

 

             Eosinophils # (test code 0.1          See_Comment                [A

utomated message] The



             = Eosinophils #)                                        system whic

h generated



                                                                 this result tra

nsmitted



                                                                 reference range

: <=0.5.



                                                                 The reference r

zhen was



                                                                 not used to int

erpret



                                                                 this result as



                                                                 normal/abnormal

.



[facility] [id number] 00:20:00





             Test Item    Value        Reference Range Interpretation Comments

 

             Basophils # (test code 0.1          See_Comment                [Aut

omated message] The



             = Basophils #)                                        system which 

generated



                                                                 this result tra

nsmitted



                                                                 reference range

: <=0.2.



                                                                 The reference r

zhen was



                                                                 not used to int

erpret



                                                                 this result as



                                                                 normal/abnormal

.



[id number]





             Test Item    Value        Reference Range Interpretation Comments

 

             C-REACTIVE PROTEIN (test code = 13.2                               

    



             C-REACTIVE PROTEIN)                                        



[facility] [id number]





             Test Item    Value        Reference Range Interpretation Comments

 

             ABO/Rh (test code = ABO/Rh) AB POS                                 



[facility] [id number] 00:20:00





             Test Item    Value        Reference Range Interpretation Comments

 

             Antibody Scrn (test Negative (22 7:20                          

 



             code = Antibody Scrn) PM)                                    



[facility] [id number] [facility]





             Test Item    Value        Reference Range Interpretation Comments

 

             Glucose Lvl (test code = Glucose Lvl) 74           70-99           

          



[facility] [id number] 00:20:00





             Test Item    Value        Reference Range Interpretation Comments

 

             BUN (test code = BUN) 15           -22                      



[facility] [id number] 00:20:00





             Test Item    Value        Reference Range Interpretation Comments

 

             Creatinine Lvl (test code = Creatinine 0.61         0.50-1.40      

           



             Lvl)                                                



[facility] [id number] 00:20:00





             Test Item    Value        Reference Range Interpretation Comments

 

             Sodium Lvl (test code = Sodium Lvl) 139          135-145           

        



[facility] [id number] 00:20:00





             Test Item    Value        Reference Range Interpretation Comments

 

             Potassium Lvl (test code = Potassium 4.9          3.5-5.1          

         



             Lvl)                                                



[facility] [id number] 00:20:00





             Test Item    Value        Reference Range Interpretation Comments

 

             Chloride Lvl (test code = Chloride Lvl) 105                  

            



[facility] [id number] 00:20:00





             Test Item    Value        Reference Range Interpretation Comments

 

             CO2 (test code = CO2) 30           24-32                     



[facility] [id number] 00:20:00





             Test Item    Value        Reference Range Interpretation Comments

 

             Calcium Lvl (test code = Calcium Lvl) 9.5          8.5-10.5        

          



[facility] [id number] 00:20:00





             Test Item    Value        Reference Range Interpretation Comments

 

             AGAP (test code = AGAP) 8.9          10.0-20.0                 



[facility] [id number] 00:20:00





             Test Item    Value        Reference Range Interpretation Comments

 

             eGFR (test code = eGFR) 129                                    



[facility] [id number] 00:20:00





             Test Item    Value        Reference Range Interpretation Comments

 

             Lactic Acid Lvl (test code = Lactic 1.2          0.5-2.2           

        



             Acid Lvl)                                           



[facility] [id number] 00:20:00





             Test Item    Value        Reference Range Interpretation Comments

 

             Procalcitonin Lvl (test 0.09         See_Comment                [Au

tomated message]



             code = Procalcitonin Lvl)                                        Th

e system which



                                                                 generated this 

result



                                                                 transmitted ref

erence



                                                                 range: <=0.10. 

The



                                                                 reference range

 was not



                                                                 used to interpr

et this



                                                                 result as



                                                                 normal/abnormal

.



[id number]





             Test Item    Value        Reference Range Interpretation Comments

 

             WBC (test code = WBC) 10.1         3.7-10.4                  



[facility] [id number] 00:20:00





             Test Item    Value        Reference Range Interpretation Comments

 

             RBC (test code = RBC) 5.14         4.70-6.10                 



[facility] D[id number] 00:20:00





             Test Item    Value        Reference Range Interpretation Comments

 

             Hgb (test code = Hgb) 15.5         14.0-18.0                 



[facility] [id number] 00:20:00





             Test Item    Value        Reference Range Interpretation Comments

 

             Hct (test code = Hct) 46.5         42.0-54.0                 



[facility] [id number] 00:20:00





             Test Item    Value        Reference Range Interpretation Comments

 

             MCV (test code = MCV) 90.5         80.0-94.0                 



[facility] DallasWvvxasoAAIIZHUIEZ4108-16-12 00:20:00





             Test Item    Value        Reference Range Interpretation Comments

 

             MCH (test code = MCH) 30.1 pg      27.0-31.0                 



[facility] [id number] 00:20:00





             Test Item    Value        Reference Range Interpretation Comments

 

             MCHC (test code = MCHC) 33.3         32.0-36.0                 



[facility] [id number] 00:20:00





             Test Item    Value        Reference Range Interpretation Comments

 

             RDW (test code = RDW) 15.7         11.5-14.5                 



[facility] [id number] 00:20:00





             Test Item    Value        Reference Range Interpretation Comments

 

             Platelet (test code = Platelet) 302          133-450               

    



[place] 077282-04-09 00:20:00





             Test Item    Value        Reference Range Interpretation Comments

 

             MPV (test code = MPV) 8.9          7.4-10.4                  



[place] [address]-04-09 00:20:00





             Test Item    Value        Reference Range Interpretation Comments

 

             Sed Rate (test code = 17           See_Comment                [Auto

mated message] The



             Sed Rate)                                           system which ge

nerated this



                                                                 result transmit

huma



                                                                 reference range

: <=15. The



                                                                 reference range

 was not



                                                                 used to interpr

et this



                                                                 result as zayda

l/abnormal.



[facility] D[id number] 00:20:00





             Test Item    Value        Reference Range Interpretation Comments

 

             PT (test code = PT) 13.1 s       12.0-14.7                 



[place] [address]-04-09 00:20:00





             Test Item    Value        Reference Range Interpretation Comments

 

             INR (test code = INR) 1.00 1       0.85-1.17                 



[place] 077282-04-09 00:20:00





             Test Item    Value        Reference Range Interpretation Comments

 

             PTT (test code = PTT) 31.5 s       22.9-35.8                 



[place] 077282-04-09 00:20:00





             Test Item    Value        Reference Range Interpretation Comments

 

             Segs (test code = Segs) 68.7         45.0-75.0                 



[place] 077282-04-09 00:20:00





             Test Item    Value        Reference Range Interpretation Comments

 

             Lymphocytes (test code = Lymphocytes) 19.6         20.0-40.0       

          



[place] 077282-04-09 00:20:00





             Test Item    Value        Reference Range Interpretation Comments

 

             Monocytes (test code = Monocytes) 9.4          2.0-12.0            

      



[place] [address]-04-09 00:20:00





             Test Item    Value        Reference Range Interpretation Comments

 

             Eosinophils (test code = 1.4          See_Comment                [A

utomated message] The



             Eosinophils)                                        system which ge

nerated



                                                                 this result tra

nsmitted



                                                                 reference range

: <=4.0.



                                                                 The reference r

zhen was



                                                                 not used to int

erpret



                                                                 this result as



                                                                 normal/abnormal

.



[place] 077282-04-09 00:20:00





             Test Item    Value        Reference Range Interpretation Comments

 

             Basophils (test code = 0.9          See_Comment                [Aut

omated message] The



             Basophils)                                          system which ge

nerated



                                                                 this result tra

nsmitted



                                                                 reference range

: <=1.0.



                                                                 The reference r

zhen was



                                                                 not used to int

erpret



                                                                 this result as



                                                                 normal/abnormal

.



[facility] [id number] 00:20:00





             Test Item    Value        Reference Range Interpretation Comments

 

             Neutrophils # (test code = Neutrophils 6.9          1.5-8.1        

           



             #)                                                  



[facility] [id number] 00:20:00





             Test Item    Value        Reference Range Interpretation Comments

 

             Lymphocytes # (test code = Lymphocytes 2.0          1.0-5.5        

           



             #)                                                  



[facility] DallasRvlzagvXSVGCLUVXK1217-88-50 00:20:00





             Test Item    Value        Reference Range Interpretation Comments

 

             Monocytes # (test code 1.0          See_Comment                [Aut

omated message] The



             = Monocytes #)                                        system which 

generated



                                                                 this result tra

nsmitted



                                                                 reference range

: <=0.8.



                                                                 The reference r

zhen was



                                                                 not used to int

erpret



                                                                 this result as



                                                                 normal/abnormal

.



[facility] DallasLnwgygcYDOIFTOYDX0109-75-23 00:20:00





             Test Item    Value        Reference Range Interpretation Comments

 

             Eosinophils # (test code 0.1          See_Comment                [A

utomated message] The



             = Eosinophils #)                                        system whic

h generated



                                                                 this result tra

nsmitted



                                                                 reference range

: <=0.5.



                                                                 The reference r

zhen was



                                                                 not used to int

erpret



                                                                 this result as



                                                                 normal/abnormal

.



[facility] DallasMmucsdtRWXQTDUOQE9069-65-26 00:20:00





             Test Item    Value        Reference Range Interpretation Comments

 

             Basophils # (test code 0.1          See_Comment                [Aut

omated message] The



             = Basophils #)                                        system which 

generated



                                                                 this result tra

nsmitted



                                                                 reference range

: <=0.2.



                                                                 The reference r

zhen was



                                                                 not used to int

erpret



                                                                 this result as



                                                                 normal/abnormal

.



[facility] Hos[id number] [facility]:00





             Test Item    Value        Reference Range Interpretation Comments

 

             C-REACTIVE PROTEIN (test code = 13.2                               

    



             C-REACTIVE PROTEIN)                                        



[facility] [id number] 00:20:00





             Test Item    Value        Reference Range Interpretation Comments

 

             ABO/Rh (test code = ABO/Rh) AB POS                                 



[facility] [id number] 00:20:00





             Test Item    Value        Reference Range Interpretation Comments

 

             Antibody Scrn (test Negative (22 7:20                          

 



             code = Antibody Scrn) PM)                                    



[facility] [id number] 00:20:00





             Test Item    Value        Reference Range Interpretation Comments

 

             Glucose Lvl (test code = Glucose Lvl) 74           70-99           

          



[place] 017232-04-09 00:20:00





             Test Item    Value        Reference Range Interpretation Comments

 

             BUN (test code = BUN) 15           7-22                      



[place] 017232-04-09 00:20:00





             Test Item    Value        Reference Range Interpretation Comments

 

             Creatinine Lvl (test code = Creatinine 0.61         0.50-1.40      

           



             Lvl)                                                



[place] 017232-04-09 00:20:00





             Test Item    Value        Reference Range Interpretation Comments

 

             Sodium Lvl (test code = Sodium Lvl) 139          135-145           

        



[place] 017232-04-09 00:20:00





             Test Item    Value        Reference Range Interpretation Comments

 

             Potassium Lvl (test code = Potassium 4.9          3.5-5.1          

         



             Lvl)                                                



[place] 017232-04-09 00:20:00





             Test Item    Value        Reference Range Interpretation Comments

 

             Chloride Lvl (test code = Chloride Lvl) 105                  

            



[place] 017232-04-09 00:20:00





             Test Item    Value        Reference Range Interpretation Comments

 

             CO2 (test code = CO2) 30           24-32                     



[place] 017232-04-09 00:20:00





             Test Item    Value        Reference Range Interpretation Comments

 

             Calcium Lvl (test code = Calcium Lvl) 9.5          8.5-10.5        

          



[facility]2022-04-09 00:20:00





             Test Item    Value        Reference Range Interpretation Comments

 

             AGAP (test code = AGAP) 8.9          10.0-20.0                 



[place] 017232-04-09 00:20:00





             Test Item    Value        Reference Range Interpretation Comments

 

             eGFR (test code = eGFR) 129                                    



[place] 017232-04-09 00:20:00





             Test Item    Value        Reference Range Interpretation Comments

 

             Lactic Acid Lvl (test code = Lactic 1.2          0.5-2.2           

        



             Acid Lvl)                                           



[place] 017232-04-09 00:20:00





             Test Item    Value        Reference Range Interpretation Comments

 

             Procalcitonin Lvl (test 0.09         See_Comment                [Au

tomated message]



             code = Procalcitonin Lvl)                                        

e system which



                                                                 generated this 

result



                                                                 transmitted ref

erence



                                                                 range: <=0.10. 

The



                                                                 reference range

 was not



                                                                 used to interpr

et this



                                                                 result as



                                                                 normal/abnormal

.



[facility] D[id number] 00:20:00





             Test Item    Value        Reference Range Interpretation Comments

 

             WBC (test code = WBC) 10.1         3.7-10.4                  



[place] 077282-04-09 00:20:00





             Test Item    Value        Reference Range Interpretation Comments

 

             RBC (test code = RBC) 5.14         4.70-6.10                 



[place] 077282-04-09 00:20:00





             Test Item    Value        Reference Range Interpretation Comments

 

             Hgb (test code = Hgb) 15.5         14.0-18.0                 



[place] [address]-04-09 00:20:00





             Test Item    Value        Reference Range Interpretation Comments

 

             Hct (test code = Hct) 46.5         42.0-54.0                 



[place] 077282-04-09 00:20:00





             Test Item    Value        Reference Range Interpretation Comments

 

             MCV (test code = MCV) 90.5         80.0-94.0                 



[place] 077282-04-09 00:20:00





             Test Item    Value        Reference Range Interpretation Comments

 

             MCH (test code = MCH) 30.1 pg      27.0-31.0                 



[place] [address]-04-09 00:20:00





             Test Item    Value        Reference Range Interpretation Comments

 

             MCHC (test code = MCHC) 33.3         32.0-36.0                 



[facility] DallasXklotgzDRUBKJWVOM9099-69-77 00:20:00





             Test Item    Value        Reference Range Interpretation Comments

 

             RDW (test code = RDW) 15.7         11.5-14.5                 



[place] 077282-04-09 00:20:00





             Test Item    Value        Reference Range Interpretation Comments

 

             Platelet (test code = Platelet) 302          133-450               

    



[facility] DallasNbkwpzyHFCLQDYYMO2780-57-77 00:20:00





             Test Item    Value        Reference Range Interpretation Comments

 

             MPV (test code = MPV) 8.9          7.4-10.4                  



[place] [address]-04-09 00:20:00





             Test Item    Value        Reference Range Interpretation Comments

 

             Sed Rate (test code = 17           See_Comment                [Auto

mated message] The



             Sed Rate)                                           system which ge

nerated this



                                                                 result transmit

huma



                                                                 reference range

: <=15. The



                                                                 reference range

 was not



                                                                 used to interpr

et this



                                                                 result as zayda

l/abnormal.



[place] 077282-04-09 00:20:00





             Test Item    Value        Reference Range Interpretation Comments

 

             PT (test code = PT) 13.1 s       12.0-14.7                 



[facility] DallasAcqyshmSCKPWRWFAM8227-66-58 00:20:00





             Test Item    Value        Reference Range Interpretation Comments

 

             INR (test code = INR) 1.00 1       0.85-1.17                 



[place] 077282-04-09 00:20:00





             Test Item    Value        Reference Range Interpretation Comments

 

             PTT (test code = PTT) 31.5 s       22.9-35.8                 



[facility] DallasAeegjixEVGHBRSCCC2174-34-49 00:20:00





             Test Item    Value        Reference Range Interpretation Comments

 

             Segs (test code = Segs) 68.7         45.0-75.0                 



[facility] DallasKhoajiyYXGGLLROZD3670-11-15 00:20:00





             Test Item    Value        Reference Range Interpretation Comments

 

             Lymphocytes (test code = Lymphocytes) 19.6         20.0-40.0       

          



[facility] DallasCpmnvgeJSSAJRWCNC7278-49-34 00:20:00





             Test Item    Value        Reference Range Interpretation Comments

 

             Monocytes (test code = Monocytes) 9.4          2.0-12.0            

      



[facility] DallasIrnvsyhELIZVXRNNT3306-75-19 00:20:00





             Test Item    Value        Reference Range Interpretation Comments

 

             Eosinophils (test code = 1.4          See_Comment                [A

utomated message] The



             Eosinophils)                                        system which ge

nerated



                                                                 this result tra

nsmitted



                                                                 reference range

: <=4.0.



                                                                 The reference r

zhen was



                                                                 not used to int

erpret



                                                                 this result as



                                                                 normal/abnormal

.



[facility] DallasDwvcmmyRQIXLWUZHX9898-53-54 00:20:00





             Test Item    Value        Reference Range Interpretation Comments

 

             Basophils (test code = 0.9          See_Comment                [Aut

omated message] The



             Basophils)                                          system which ge

nerated



                                                                 this result tra

nsmitted



                                                                 reference range

: <=1.0.



                                                                 The reference r

zhen was



                                                                 not used to int

erpret



                                                                 this result as



                                                                 normal/abnormal

.



[id number] [facility]





             Test Item    Value        Reference Range Interpretation Comments

 

             Neutrophils # (test code = Neutrophils 6.9          1.5-8.1        

           



             #)                                                  



[facility] DallasHxmjyxuRHCNGBBKFX1135-67-60 00:20:00





             Test Item    Value        Reference Range Interpretation Comments

 

             Lymphocytes # (test code = Lymphocytes 2.0          1.0-5.5        

           



             #)                                                  



[facility] DallasTnhegpsRPPUSOGWKF1233-71-10 00:20:00





             Test Item    Value        Reference Range Interpretation Comments

 

             Monocytes # (test code 1.0          See_Comment                [Aut

omated message] The



             = Monocytes #)                                        system which 

generated



                                                                 this result tra

nsmitted



                                                                 reference range

: <=0.8.



                                                                 The reference r

zhen was



                                                                 not used to int

erpret



                                                                 this result as



                                                                 normal/abnormal

.



[facility]





             Test Item    Value        Reference Range Interpretation Comments

 

             Eosinophils # (test code 0.1          See_Comment                [A

utomated message] The



             = Eosinophils #)                                        system whic

h generated



                                                                 this result tra

nsmitted



                                                                 reference range

: <=0.5.



                                                                 The reference r

zhen was



                                                                 not used to int

erpret



                                                                 this result as



                                                                 normal/abnormal

.



[facility]roz[facility]HWZYGVOBML3332[facility]18[facility]





             Test Item    Value        Reference Range Interpretation Comments

 

             Basophils # (test code 0.1          See_Comment                [Aut

omated message] The



             = Basophils #)                                        system which 

generated



                                                                 this result tra

nsmitted



                                                                 reference range

: <=0.2.



                                                                 The reference r

zhen was



                                                                 not used to int

erpret



                                                                 this result as



                                                                 normal/abnormal

.



[facility] LrvgmolXDZQJDJHGR0960-42-74 00:20:00





             Test Item    Value        Reference Range Interpretation Comments

 

             C-REACTIVE PROTEIN (test code = 13.2                               

    



             C-REACTIVE PROTEIN)                                        



[facility] [employer] [id number] 00:20:00





             Test Item    Value        Reference Range Interpretation Comments

 

             ABO/Rh (test code = ABO/Rh) AB POS                                 



[facility] [employer] [id number] 00:20:00





             Test Item    Value        Reference Range Interpretation Comments

 

             Antibody Scrn (test Negative (22 7:20                          

 



             code = Antibody Scrn) PM)                                    



[facility] [employer]2022-04-09 00:20:00





             Test Item    Value        Reference Range Interpretation Comments

 

             Glucose Lvl (test code = Glucose Lvl) 74           70-99           

          



[facility] [employer] [facility]2022-04-09 00:20:00





             Test Item    Value        Reference Range Interpretation Comments

 

             BUN (test code = BUN) 15           7-22                      



[facility]2022-04-09 00:20:00





             Test Item    Value        Reference Range Interpretation Comments

 

             Creatinine Lvl (test code = Creatinine 0.61         0.50-1.40      

           



             Lvl)                                                



[facility] [employer] [facility]2022-04-09 00:20:00





             Test Item    Value        Reference Range Interpretation Comments

 

             Sodium Lvl (test code = Sodium Lvl) 139          135-145           

        



[place] 017232-04-09 00:20:00





             Test Item    Value        Reference Range Interpretation Comments

 

             Potassium Lvl (test code = Potassium 4.9          3.5-5.1          

         



             Lvl)                                                



[place] 017232-04-09 00:20:00





             Test Item    Value        Reference Range Interpretation Comments

 

             Chloride Lvl (test code = Chloride Lvl) 105                  

            



[place] 017232-04-09 00:20:00





             Test Item    Value        Reference Range Interpretation Comments

 

             CO2 (test code = CO2) 30           24-32                     



[place] 017232-04-09 00:20:00





             Test Item    Value        Reference Range Interpretation Comments

 

             Calcium Lvl (test code = Calcium Lvl) 9.5          8.5-10.5        

          



[place] 017232-04-09 00:20:00





             Test Item    Value        Reference Range Interpretation Comments

 

             AGAP (test code = AGAP) 8.9          10.0-20.0                 



[place] 017232-04-09 00:20:00





             Test Item    Value        Reference Range Interpretation Comments

 

             eGFR (test code = eGFR) 129                                    



[place] 017232-04-09 00:20:00





             Test Item    Value        Reference Range Interpretation Comments

 

             Lactic Acid Lvl (test code = Lactic 1.2          0.5-2.2           

        



             Acid Lvl)                                           



[place] 017232-04-09 00:20:00





             Test Item    Value        Reference Range Interpretation Comments

 

             Procalcitonin Lvl (test 0.09         See_Comment                [Au

tomated message]



             code = Procalcitonin Lvl)                                        

e system which



                                                                 generated this 

result



                                                                 transmitted ref

erence



                                                                 range: <=0.10. 

The



                                                                 reference range

 was not



                                                                 used to interpr

et this



                                                                 result as



                                                                 normal/abnormal

.



[facility] DallasFzwyrtuXXFMQNAVSH5161-51-68 [facility]:20:00





             Test Item    Value        Reference Range Interpretation Comments

 

             WBC (test code = WBC) 10.1         3.7-10.4                  



[place] [address]-04-09 00:20:00





             Test Item    Value        Reference Range Interpretation Comments

 

             RBC (test code = RBC) 5.14         4.70-6.10                 



[place] [address]-04-09 00:20:00





             Test Item    Value        Reference Range Interpretation Comments

 

             Hgb (test code = Hgb) 15.5         14.0-18.0                 



[place] [address]-04-09 00:20:00





             Test Item    Value        Reference Range Interpretation Comments

 

             Hct (test code = Hct) 46.5         42.0-54.0                 



[facility] DallasDnsazseXTJRVPJVAI4099-06-91 00:20:00





             Test Item    Value        Reference Range Interpretation Comments

 

             MCV (test code = MCV) 90.5         80.0-94.0                 



[facility] DallasLftvvqiPXQLVZEONI5686-16-75 00:20:00





             Test Item    Value        Reference Range Interpretation Comments

 

             MCH (test code = MCH) 30.1 pg      27.0-31.0                 



[facility] DallasHeqxpjpJDQMYCPIAW5691-52-80 00:20:00





             Test Item    Value        Reference Range Interpretation Comments

 

             MCHC (test code = MCHC) 33.3         32.0-36.0                 



[facility] DallasAjcbbukMBUEYMXPDQ8457-40-20 00:20:00





             Test Item    Value        Reference Range Interpretation Comments

 

             RDW (test code = RDW) 15.7         11.5-14.5                 



[facility] DallasQgoitqbYHOTMMBLRA7715-35-84 00:20:00





             Test Item    Value        Reference Range Interpretation Comments

 

             Platelet (test code = Platelet) 302          133-450               

    



[facility] DallasRadnwedWUNHLKCZOI8470-80-98 00:20:00





             Test Item    Value        Reference Range Interpretation Comments

 

             MPV (test code = MPV) 8.9          7.4-10.4                  



[facility] DallasUrkvrkyXHXGHVTRHE0170-74-31 00:20:00





             Test Item    Value        Reference Range Interpretation Comments

 

             Sed Rate (test code = 17           See_Comment                [Auto

mated message] The



             Sed Rate)                                           system which ge

nerated this



                                                                 result transmit

huma



                                                                 reference range

: <=15. The



                                                                 reference range

 was not



                                                                 used to interpr

et this



                                                                 result as zayda

l/abnormal.



[facility] DallasMdjmphjTMNBVJTCUF9883-42-79 00:20:00





             Test Item    Value        Reference Range Interpretation Comments

 

             PT (test code = PT) 13.1 s       12.0-14.7                 



[facility] DallasOiovszfQGNJCSXJIA7899-32-84 00:20:00





             Test Item    Value        Reference Range Interpretation Comments

 

             INR (test code = INR) 1.00 1       0.85-1.17                 



[facility] DallasAjojbylBMACIGZUMH4116-28-08 00:20:00





             Test Item    Value        Reference Range Interpretation Comments

 

             PTT (test code = PTT) 31.5 s       22.9-35.8                 



[facility] DallasVmfvscqAWLCSWADJH3127-49-34 00:20:00





             Test Item    Value        Reference Range Interpretation Comments

 

             Segs (test code = Segs) 68.7         45.0-75.0                 



[facility] DallasXzdebqtHIHLJCMAEB9858-63-89 00:20:00





             Test Item    Value        Reference Range Interpretation Comments

 

             Lymphocytes (test code = Lymphocytes) 19.6         20.0-40.0       

          



[facility] DallasWzyxwtyHDLLSNTXQV7654-15-85 00:20:00





             Test Item    Value        Reference Range Interpretation Comments

 

             Monocytes (test code = Monocytes) 9.4          2.0-12.0            

      



[facility] DallasBrmloesVCEIMCHDFW3745-51-89 00:20:00





             Test Item    Value        Reference Range Interpretation Comments

 

             Eosinophils (test code = 1.4          See_Comment                [A

utomated message] The



             Eosinophils)                                        system which ge

nerated



                                                                 this result tra

nsmitted



                                                                 reference range

: <=4.0.



                                                                 The reference r

zhen was



                                                                 not used to int

erpret



                                                                 this result as



                                                                 normal/abnormal

.



[facility] DallasVzfbqpmLQKTTBATDV1745-55-55 00:20:00





             Test Item    Value        Reference Range Interpretation Comments

 

             Basophils (test code = 0.9          See_Comment                [Aut

omated message] The



             Basophils)                                          system which ge

nerated



                                                                 this result tra

nsmitted



                                                                 reference range

: <=1.0.



                                                                 The reference r

zhen was



                                                                 not used to int

erpret



                                                                 this result as



                                                                 normal/abnormal

.



[facility] DallasHlhqmetBUHKJNVGWO8122-46-04 00:20:00





             Test Item    Value        Reference Range Interpretation Comments

 

             Neutrophils # (test code = Neutrophils 6.9          1.5-8.1        

           



             #)                                                  



[facility] DallasJutogcbMMCQMNMOLK2587-51-27 00:20:00





             Test Item    Value        Reference Range Interpretation Comments

 

             Lymphocytes # (test code = Lymphocytes 2.0          1.0-5.5        

           



             #)                                                  



[place] 077282-04-09 00:20:00





             Test Item    Value        Reference Range Interpretation Comments

 

             Monocytes # (test code 1.0          See_Comment                [Aut

omated message] The



             = Monocytes #)                                        system which 

generated



                                                                 this result tra

nsmitted



                                                                 reference range

: <=0.8.



                                                                 The reference r

zhen was



                                                                 not used to int

erpret



                                                                 this result as



                                                                 normal/abnormal

.



[facility] DallasYteibpwHUJELZYIYH6526-89-97 00:20:00





             Test Item    Value        Reference Range Interpretation Comments

 

             Eosinophils # (test code 0.1          See_Comment                [A

utomated message] The



             = Eosinophils #)                                        system ic

h generated



                                                                 this result tra

nsmitted



                                                                 reference range

: <=0.5.



                                                                 The reference r

zhen was



                                                                 not used to int

erpret



                                                                 this result as



                                                                 normal/abnormal

.



[facility] ApacminQHCUXIOLVZ8598-56-56 00:20:00





             Test Item    Value        Reference Range Interpretation Comments

 

             Basophils # (test code 0.1          See_Comment                [Aut

omated message] The



             = Basophils #)                                        system which 

generated



                                                                 this result tra

nsmitted



                                                                 reference range

: <=0.2.



                                                                 The reference r

zhen was



                                                                 not used to int

erpret



                                                                 this result as



                                                                 normal/abnormal

.



[facility] XhmeqgkRKTHHQVKHQ2607-89-67 00:20:00





             Test Item    Value        Reference Range Interpretation Comments

 

             C-REACTIVE PROTEIN (test code = 13.2                               

    



             C-REACTIVE PROTEIN)                                        



[facility] [employer] [id number] 00:20:00





             Test Item    Value        Reference Range Interpretation Comments

 

             ABO/Rh (test code = ABO/Rh) AB POS                                 



[facility] [employer] [id number] 00:20:00





             Test Item    Value        Reference Range Interpretation Comments

 

             Antibody Scrn (test Negative (22 7:20                          

 



             code = Antibody Scrn) PM)                                    



[facility]2022-04-09 00:20:00





             Test Item    Value        Reference Range Interpretation Comments

 

             Glucose Lvl (test code = Glucose Lvl) 74           70-99           

          



[facility]2022-04-09 00:20:00





             Test Item    Value        Reference Range Interpretation Comments

 

             BUN (test code = BUN) 15           7-22                      



[facility]2022-04-09 00:20:00





             Test Item    Value        Reference Range Interpretation Comments

 

             Creatinine Lvl (test code = Creatinine 0.61         0.50-1.40      

           



             Lvl)                                                



[facility]2022-04-09 00:20:00





             Test Item    Value        Reference Range Interpretation Comments

 

             Sodium Lvl (test code = Sodium Lvl) 139          135-145           

        



[facility]2022-04-09 00:20:00





             Test Item    Value        Reference Range Interpretation Comments

 

             Potassium Lvl (test code = Potassium 4.9          3.5-5.1          

         



             Lvl)                                                



[facility]2022-04-09 00:20:00





             Test Item    Value        Reference Range Interpretation Comments

 

             Chloride Lvl (test code = Chloride Lvl) 105                  

            



[facility]2022-04-09 00:20:00





             Test Item    Value        Reference Range Interpretation Comments

 

             CO2 (test code = CO2) 30           24-32                     



[facility]2022-04-09 00:20:00





             Test Item    Value        Reference Range Interpretation Comments

 

             Calcium Lvl (test code = Calcium Lvl) 9.5          8.5-10.5        

          



[facility]2022-04-09 00:20:00





             Test Item    Value        Reference Range Interpretation Comments

 

             AGAP (test code = AGAP) 8.9          10.0-20.0                 



[place] 017232-04-09 00:20:00





             Test Item    Value        Reference Range Interpretation Comments

 

             eGFR (test code = eGFR) 129                                    



[facility]2022-04-09 00:20:00





             Test Item    Value        Reference Range Interpretation Comments

 

             Lactic Acid Lvl (test code = Lactic 1.2          0.5-2.2           

        



             Acid Lvl)                                           



[facility]2022-04-09 00:20:00





             Test Item    Value        Reference Range Interpretation Comments

 

             Procalcitonin Lvl (test 0.09         See_Comment                [Au

tomated message]



             code = Procalcitonin Lvl)                                        

e system which



                                                                 generated this 

result



                                                                 transmitted ref

erence



                                                                 range: <=0.10. 

The



                                                                 reference range

 was not



                                                                 used to interpr

et this



                                                                 result as



                                                                 normal/abnormal

.



[facility] DallasVmqceyvFGTOBRNIKL2396-49-96 [facility]:20:00





             Test Item    Value        Reference Range Interpretation Comments

 

             WBC (test code = WBC) 10.1         3.7-10.4                  



[place] 077282-04-09 00:20:00





             Test Item    Value        Reference Range Interpretation Comments

 

             RBC (test code = RBC) 5.14         4.70-6.10                 



[place] 077282-04-09 00:20:00





             Test Item    Value        Reference Range Interpretation Comments

 

             Hgb (test code = Hgb) 15.5         14.0-18.0                 



[place] [address]-04-09 00:20:00





             Test Item    Value        Reference Range Interpretation Comments

 

             Hct (test code = Hct) 46.5         42.0-54.0                 



[place] 077282-04-09 00:20:00





             Test Item    Value        Reference Range Interpretation Comments

 

             MCV (test code = MCV) 90.5         80.0-94.0                 



[place] 077282-04-09 00:20:00





             Test Item    Value        Reference Range Interpretation Comments

 

             MCH (test code = MCH) 30.1 pg      27.0-31.0                 



[place] 077282-04-09 00:20:00





             Test Item    Value        Reference Range Interpretation Comments

 

             MCHC (test code = MCHC) 33.3         32.0-36.0                 



[facility] DallasDfhywjeWGFPXUCCJV7300-98-55 00:20:00





             Test Item    Value        Reference Range Interpretation Comments

 

             RDW (test code = RDW) 15.7         11.5-14.5                 



[facility] DallasWdrnezuRDCLLYMHHM8777-24-49 00:20:00





             Test Item    Value        Reference Range Interpretation Comments

 

             Platelet (test code = Platelet) 302          133-450               

    



[place] [facility]WEI4756[facility]49-34 [facility]20:00





             Test Item    Value        Reference Range Interpretation Comments

 

             MPV (test code = MPV) 8.9          7.4-10.4                  



[facility] DallasOncymkzVPAMKOAHZG3808-12-82 00:20:00





             Test Item    Value        Reference Range Interpretation Comments

 

             Sed Rate (test code = 17           See_Comment                [Auto

mated message] The



             Sed Rate)                                           system which ge

nerated this



                                                                 result transmit

huma



                                                                 reference range

: <=15. The



                                                                 reference range

 was not



                                                                 used to interpr

et this



                                                                 result as zayda

l/abnormal.



[facility] DallasHqubzguMKJKAGWPWN2433-15-80 00:20:00





             Test Item    Value        Reference Range Interpretation Comments

 

             PT (test code = PT) 13.1 s       12.0-14.7                 



[facility] DallasRfbwrucXMHBXIWOEY3000-82-85 00:20:00





             Test Item    Value        Reference Range Interpretation Comments

 

             INR (test code = INR) 1.00 1       0.85-1.17                 



[facility] [id number] 00:20:00





             Test Item    Value        Reference Range Interpretation Comments

 

             ABO/Rh (test code = ABO/Rh) AB POS                                 



[facility] DallasQapktdcYCWWRKJWRO4575-74-47 00:20:00





             Test Item    Value        Reference Range Interpretation Comments

 

             PTT (test code = PTT) 31.5 s       22.9-35.8                 



[facility] DallasHnceqwhRMDDZBYVGJ1074-65-80 00:20:00





             Test Item    Value        Reference Range Interpretation Comments

 

             Segs (test code = Segs) 68.7         45.0-75.0                 



[facility] DallasNfwqqcqLYZTLKQFSN5507-47-69 00:20:00





             Test Item    Value        Reference Range Interpretation Comments

 

             Lymphocytes (test code = Lymphocytes) 19.6         20.0-40.0       

          



[facility] DallasCitzhyrBZKFHRKNAU1710-03-82 00:20:00





             Test Item    Value        Reference Range Interpretation Comments

 

             Monocytes (test code = Monocytes) 9.4          2.0-12.0            

      



[facility] DallasWjnnmsgSIRENCIWFP6884-97-45 00:20:00





             Test Item    Value        Reference Range Interpretation Comments

 

             Eosinophils (test code = 1.4          See_Comment                [A

utomated message] The



             Eosinophils)                                        system which ge

nerated



                                                                 this result tra

nsmitted



                                                                 reference range

: <=4.0.



                                                                 The reference r

zhen was



                                                                 not used to int

erpret



                                                                 this result as



                                                                 normal/abnormal

.



[facility] DallasYolcyicGJMSWUCZLB5847-15-37 00:20:00





             Test Item    Value        Reference Range Interpretation Comments

 

             Basophils (test code = 0.9          See_Comment                [Aut

omated message] The



             Basophils)                                          system which ge

nerated



                                                                 this result tra

nsmitted



                                                                 reference range

: <=1.0.



                                                                 The reference r

zhen was



                                                                 not used to int

erpret



                                                                 this result as



                                                                 normal/abnormal

.



[facility] DallasAtejtsvAPRXFXVRFG2470-62-87 00:20:00





             Test Item    Value        Reference Range Interpretation Comments

 

             Neutrophils # (test code = Neutrophils 6.9          1.5-8.1        

           



             #)                                                  



[facility] DallasCmmkkigGAQWEESEKZ4642-24-94 00:20:00





             Test Item    Value        Reference Range Interpretation Comments

 

             Lymphocytes # (test code = Lymphocytes 2.0          1.0-5.5        

           



             #)                                                  



[facility] DallasHsgznxwWIQYLLZZBO6771-62-35 00:20:00





             Test Item    Value        Reference Range Interpretation Comments

 

             Monocytes # (test code 1.0          See_Comment                [Aut

omated message] The



             = Monocytes #)                                        system which 

generated



                                                                 this result tra

nsmitted



                                                                 reference range

: <=0.8.



                                                                 The reference r

zhen was



                                                                 not used to int

erpret



                                                                 this result as



                                                                 normal/abnormal

.



[facility] DallasAvovjtkBEBEIBUIDU0612-51-56 00:20:00





             Test Item    Value        Reference Range Interpretation Comments

 

             Eosinophils # (test code 0.1          See_Comment                [A

utomated message] The



             = Eosinophils #)                                        system whic

h generated



                                                                 this result tra

nsmitted



                                                                 reference range

: <=0.5.



                                                                 The reference r

zhen was



                                                                 not used to int

erpret



                                                                 this result as



                                                                 normal/abnormal

.



[facility] [id number] 00:20:00





             Test Item    Value        Reference Range Interpretation Comments

 

             Antibody Scrn (test Negative (22 7:20                          

 



             code = Antibody Scrn) PM)                                    



[facility] DallasSjpejwaKHOPHWJRVW1528-92-21 00:20:00





             Test Item    Value        Reference Range Interpretation Comments

 

             Basophils # (test code 0.1          See_Comment                [Aut

omated message] The



             = Basophils #)                                        system which 

generated



                                                                 this result tra

nsmitted



                                                                 reference range

: <=0.2.



                                                                 The reference r

zhen was



                                                                 not used to int

erpret



                                                                 this result as



                                                                 normal/abnormal

.



[facility]ST[facility]UBWBZU4031[facility]





             Test Item    Value        Reference Range Interpretation Comments

 

             C-REACTIVE PROTEIN (test code = 13.2                               

    



             C-REACTIVE PROTEIN)                                        



[facility]2022-04-09 00:20:00





             Test Item    Value        Reference Range Interpretation Comments

 

             Glucose Lvl (test code = Glucose Lvl) 74           70-99           

          



[place] 017232-04-09 00:20:00





             Test Item    Value        Reference Range Interpretation Comments

 

             BUN (test code = BUN) 15           7-22                      



[place] 017232-04-09 00:20:00





             Test Item    Value        Reference Range Interpretation Comments

 

             Creatinine Lvl (test code = Creatinine 0.61         0.50-1.40      

           



             Lvl)                                                



[facility]2022-04-09 00:20:00





             Test Item    Value        Reference Range Interpretation Comments

 

             Sodium Lvl (test code = Sodium Lvl) 139          135-145           

        



[place] 017232-04-09 00:20:00





             Test Item    Value        Reference Range Interpretation Comments

 

             Potassium Lvl (test code = Potassium 4.9          3.5-5.1          

         



             Lvl)                                                



[facility]2022-04-09 00:20:00





             Test Item    Value        Reference Range Interpretation Comments

 

             Chloride Lvl (test code = Chloride Lvl) 105                  

            



[place] 017232-04-09 00:20:00





             Test Item    Value        Reference Range Interpretation Comments

 

             CO2 (test code = CO2) 30           24-32                     



[place] 017232-04-09 00:20:00





             Test Item    Value        Reference Range Interpretation Comments

 

             Calcium Lvl (test code = Calcium Lvl) 9.5          8.5-10.5        

          



[place] 017232-04-09 00:20:00





             Test Item    Value        Reference Range Interpretation Comments

 

             AGAP (test code = AGAP) 8.9          10.0-20.0                 



[place] 017232-04-09 00:20:00





             Test Item    Value        Reference Range Interpretation Comments

 

             eGFR (test code = eGFR) 129                                    



[place] 017232-04-09 00:20:00





             Test Item    Value        Reference Range Interpretation Comments

 

             Lactic Acid Lvl (test code = Lactic 1.2          0.5-2.2           

        



             Acid Lvl)                                           



[facility]2022-04-09 00:20:00





             Test Item    Value        Reference Range Interpretation Comments

 

             Procalcitonin Lvl (test 0.09         See_Comment                [Au

tomated message]



             code = Procalcitonin Lvl)                                        

e system which



                                                                 generated this 

result



                                                                 transmitted ref

erence



                                                                 range: <=0.10. 

The



                                                                 reference range

 was not



                                                                 used to interpr

et this



                                                                 result as



                                                                 normal/abnormal

.



[facility] DallasEvbwmrhREBDEONESP4897-56-84 00:20:00





             Test Item    Value        Reference Range Interpretation Comments

 

             WBC (test code = WBC) 10.1         3.7-10.4                  



[facility] DallasRefoixnJIKKSVYVDO5845-93-03 00:20:00





             Test Item    Value        Reference Range Interpretation Comments

 

             RBC (test code = RBC) 5.14         4.70-6.10                 



[facility] DallasHgyndlgRZNSEZRGHN4782-48-18 00:20:00





             Test Item    Value        Reference Range Interpretation Comments

 

             Hgb (test code = Hgb) 15.5         14.0-18.0                 



[facility] DallasCvortasGNKTXHIGQV3634-15-40 00:20:00





             Test Item    Value        Reference Range Interpretation Comments

 

             Hct (test code = Hct) 46.5         42.0-54.0                 



[facility] DallasNkcaofvNUUPTYNQEN3797-12-70 00:20:00





             Test Item    Value        Reference Range Interpretation Comments

 

             MCV (test code = MCV) 90.5         80.0-94.0                 



[facility] DallasXinibqpXTDHAYEBTJ8599-00-27 00:20:00





             Test Item    Value        Reference Range Interpretation Comments

 

             MCH (test code = MCH) 30.1 pg      27.0-31.0                 



[facility] DallasBqvcuntXKFVRAJODG3902-23-11 00:20:00





             Test Item    Value        Reference Range Interpretation Comments

 

             MCHC (test code = MCHC) 33.3         32.0-36.0                 



[place] 077282-04-09 00:20:00





             Test Item    Value        Reference Range Interpretation Comments

 

             RDW (test code = RDW) 15.7         11.5-14.5                 



[facility] DallasGgzsrnnQBYMGQKMXQ8805-39-66 00:20:00





             Test Item    Value        Reference Range Interpretation Comments

 

             Platelet (test code = Platelet) 302          133-450               

    



[facility] DallasJejayrnBMNEVGCJCZ4056-20-40 00:20:00





             Test Item    Value        Reference Range Interpretation Comments

 

             MPV (test code = MPV) 8.9          7.4-10.4                  



[place] 077282-04-09 00:20:00





             Test Item    Value        Reference Range Interpretation Comments

 

             Sed Rate (test code = 17           See_Comment                [Auto

mated message] The



             Sed Rate)                                           system which ge

nerated this



                                                                 result transmit

huma



                                                                 reference range

: <=15. The



                                                                 reference range

 was not



                                                                 used to interpr

et this



                                                                 result as zayda

l/abnormal.



[facility] DallasRbexbnhFJUQRJWNQT0694-27-10 00:20:00





             Test Item    Value        Reference Range Interpretation Comments

 

             PT (test code = PT) 13.1 s       12.0-14.7                 



[facility] DallasPuptwkkXWWKUBIEVO1995-81-87 00:20:00





             Test Item    Value        Reference Range Interpretation Comments

 

             INR (test code = INR) 1.00 1       0.85-1.17                 



[facility] DallasYuuercxMYKXBRBRVE7078-42-70 00:20:00





             Test Item    Value        Reference Range Interpretation Comments

 

             PTT (test code = PTT) 31.5 s       22.9-35.8                 



[place] 077282-04-09 00:20:00





             Test Item    Value        Reference Range Interpretation Comments

 

             Segs (test code = Segs) 68.7         45.0-75.0                 



[place] 077282-04-09 00:20:00





             Test Item    Value        Reference Range Interpretation Comments

 

             Lymphocytes (test code = Lymphocytes) 19.6         20.0-40.0       

          



[facility] DallasFcbhgduBRUPIZGYXM4014-55-25 00:20:00





             Test Item    Value        Reference Range Interpretation Comments

 

             Monocytes (test code = Monocytes) 9.4          2.0-12.0            

      



[place] 077282-04-09 00:20:00





             Test Item    Value        Reference Range Interpretation Comments

 

             Eosinophils (test code = 1.4          See_Comment                [A

utomated message] The



             Eosinophils)                                        system which ge

nerated



                                                                 this result tra

nsmitted



                                                                 reference range

: <=4.0.



                                                                 The reference r

zhen was



                                                                 not used to int

erpret



                                                                 this result as



                                                                 normal/abnormal

.



[place] 077282-04-09 00:20:00





             Test Item    Value        Reference Range Interpretation Comments

 

             Basophils (test code = 0.9          See_Comment                [Aut

omated message] The



             Basophils)                                          system which ge

nerated



                                                                 this result tra

nsmitted



                                                                 reference range

: <=1.0.



                                                                 The reference r

zhen was



                                                                 not used to int

erpret



                                                                 this result as



                                                                 normal/abnormal

.



[place] 077282-04-09 00:20:00





             Test Item    Value        Reference Range Interpretation Comments

 

             Neutrophils # (test code = Neutrophils 6.9          1.5-8.1        

           



             #)                                                  



[facility]ql[facility]CH[facility]QJASP1212[facility]





             Test Item    Value        Reference Range Interpretation Comments

 

             Lymphocytes # (test code = Lymphocytes 2.0          1.0-5.5        

           



             #)                                                  



[facility] DallasSwytdiyQRBNNIDTBZ8265-60-60 00:20:00





             Test Item    Value        Reference Range Interpretation Comments

 

             Monocytes # (test code 1.0          See_Comment                [Aut

omated message] The



             = Monocytes #)                                        system which 

generated



                                                                 this result tra

nsmitted



                                                                 reference range

: <=0.8.



                                                                 The reference r

zhen was



                                                                 not used to int

erpret



                                                                 this result as



                                                                 normal/abnormal

.



[facility] HospitalOkilnycFBZCYUVRYJ9252-07-23 00:20:00





             Test Item    Value        Reference Range Interpretation Comments

 

             Eosinophils # (test code 0.1          See_Comment                [A

utomated message] The



             = Eosinophils #)                                        system whic

h generated



                                                                 this result tra

nsmitted



                                                                 reference range

: <=0.5.



                                                                 The reference r

zhen was



                                                                 not used to int

erpret



                                                                 this result as



                                                                 normal/abnormal

.



[facility] HospitalApkivvzUAQMKTUZNQ9556-72-03 00:20:00





             Test Item    Value        Reference Range Interpretation Comments

 

             Basophils # (test code 0.1          See_Comment                [Aut

omated message] The



             = Basophils #)                                        system which 

generated



                                                                 this result tra

nsmitted



                                                                 reference range

: <=0.2.



                                                                 The reference r

zhen was



                                                                 not used to int

erpret



                                                                 this result as



                                                                 normal/abnormal

.



[facility] HospitalWswysfkBLVPGOHQKN5988-87-32 00:20:00





             Test Item    Value        Reference Range Interpretation Comments

 

             C-REACTIVE PROTEIN (test code = 13.2                               

    



             C-REACTIVE PROTEIN)                                        



[facility] [employer] UCHAFUJ2407-64-88 00:20:00





             Test Item    Value        Reference Range Interpretation Comments

 

             ABO/Rh (test code = ABO/Rh) AB POS                                 



[facility] [employer] CJGFZVF9377-20-80 00:20:00





             Test Item    Value        Reference Range Interpretation Comments

 

             Antibody Scrn (test Negative (22 7:20                          

 



             code = Antibody Scrn) PM)                                    



[facility] [employer] FSHWC3649-97-42 00:20:00





             Test Item    Value        Reference Range Interpretation Comments

 

             Glucose Lvl (test code = Glucose Lvl) 74           70-99           

          



[facility] [employer]2022-04-09 00:20:00





             Test Item    Value        Reference Range Interpretation Comments

 

             BUN (test code = BUN) 15           7-22                      



[facility]2022-04-09 00:20:00





             Test Item    Value        Reference Range Interpretation Comments

 

             Creatinine Lvl (test code = Creatinine 0.61         0.50-1.40      

           



             Lvl)                                                



[facility]2022-04-09 00:20:00





             Test Item    Value        Reference Range Interpretation Comments

 

             Sodium Lvl (test code = Sodium Lvl) 139          135-145           

        



[place] 017232-04-09 00:20:00





             Test Item    Value        Reference Range Interpretation Comments

 

             Potassium Lvl (test code = Potassium 4.9          3.5-5.1          

         



             Lvl)                                                



[facility]2022-04-09 00:20:00





             Test Item    Value        Reference Range Interpretation Comments

 

             Chloride Lvl (test code = Chloride Lvl) 105                  

            



[facility]2022-04-09 00:20:00





             Test Item    Value        Reference Range Interpretation Comments

 

             CO2 (test code = CO2) 30           24-32                     



[place] 017232-04-09 00:20:00





             Test Item    Value        Reference Range Interpretation Comments

 

             Calcium Lvl (test code = Calcium Lvl) 9.5          8.5-10.5        

          



[facility] [facility] 00:20:00





             Test Item    Value        Reference Range Interpretation Comments

 

             AGAP (test code = AGAP) 8.9          10.0-20.0                 



[facility]2022-04-09 00:20:00





             Test Item    Value        Reference Range Interpretation Comments

 

             eGFR (test code = eGFR) 129                                    



[facility]2022-04-09 00:20:00





             Test Item    Value        Reference Range Interpretation Comments

 

             Lactic Acid Lvl (test code = Lactic 1.2          0.5-2.2           

        



             Acid Lvl)                                           



[place] 017232-04-09 00:20:00





             Test Item    Value        Reference Range Interpretation Comments

 

             Procalcitonin Lvl (test 0.09         See_Comment                [Au

tomated message]



             code = Procalcitonin Lvl)                                        

e system which



                                                                 generated this 

result



                                                                 transmitted ref

erence



                                                                 range: <=0.10. 

The



                                                                 reference range

 was not



                                                                 used to interpr

et this



                                                                 result as



                                                                 normal/abnormal

.



[facility]





             Test Item    Value        Reference Range Interpretation Comments

 

             WBC (test code = WBC) 10.1         3.7-10.4                  



[place] [address]-04-09 00:20:00





             Test Item    Value        Reference Range Interpretation Comments

 

             RBC (test code = RBC) 5.14         4.70-6.10                 



[facility]zqutqYARFAYTAIY0352[facility]14-34 [facility]:00





             Test Item    Value        Reference Range Interpretation Comments

 

             Hgb (test code = Hgb) 15.5         14.0-18.0                 



[place] 077282-04-09 00:20:00





             Test Item    Value        Reference Range Interpretation Comments

 

             Hct (test code = Hct) 46.5         42.0-54.0                 



[place] 077282-04-09 00:20:00





             Test Item    Value        Reference Range Interpretation Comments

 

             MCV (test code = MCV) 90.5         80.0-94.0                 



[place] 077282-04-09 00:20:00





             Test Item    Value        Reference Range Interpretation Comments

 

             MCH (test code = MCH) 30.1 pg      27.0-31.0                 



[place] 077282-04-09 00:20:00





             Test Item    Value        Reference Range Interpretation Comments

 

             MCHC (test code = MCHC) 33.3         32.0-36.0                 



[place] 077282-04-09 00:20:00





             Test Item    Value        Reference Range Interpretation Comments

 

             RDW (test code = RDW) 15.7         11.5-14.5                 



[place] 077282-04-09 00:20:00





             Test Item    Value        Reference Range Interpretation Comments

 

             Platelet (test code = Platelet) 302          133-450               

    



[facility]dgiyryJALQBPPVQD3304[facility]38-90 [facility]:20:[facility]





             Test Item    Value        Reference Range Interpretation Comments

 

             MPV (test code = MPV) 8.9          7.4-10.4                  



[place] 077282-04-09 00:20:00





             Test Item    Value        Reference Range Interpretation Comments

 

             Sed Rate (test code = 17           See_Comment                [Auto

mated message] The



             Sed Rate)                                           system which ge

nerated this



                                                                 result transmit

huma



                                                                 reference range

: <=15. The



                                                                 reference range

 was not



                                                                 used to interpr

et this



                                                                 result as zayda

l/abnormal.



[place] 077282-04-09 00:20:00





             Test Item    Value        Reference Range Interpretation Comments

 

             PT (test code = PT) 13.1 s       12.0-14.7                 



[place] 077282-04-09 00:20:00





             Test Item    Value        Reference Range Interpretation Comments

 

             INR (test code = INR) 1.00 1       0.85-1.17                 



[facility] DallasZbqithtLHEEFFIAUQ5704-95-56 00:20:00





             Test Item    Value        Reference Range Interpretation Comments

 

             PTT (test code = PTT) 31.5 s       22.9-35.8                 



[facility] DallasAwqdxawOOWCKESOKR4419-18-05 00:20:00





             Test Item    Value        Reference Range Interpretation Comments

 

             Segs (test code = Segs) 68.7         45.0-75.0                 



[facility] DallasPthycinPQRIFKSJIB6283-60-13 00:20:00





             Test Item    Value        Reference Range Interpretation Comments

 

             Lymphocytes (test code = Lymphocytes) 19.6         20.0-40.0       

          



[place] 077282-04-09 00:20:00





             Test Item    Value        Reference Range Interpretation Comments

 

             Monocytes (test code = Monocytes) 9.4          2.0-12.0            

      



[facility] DallasOvndcanSFIOFYROVV4700-48-48 00:20:00





             Test Item    Value        Reference Range Interpretation Comments

 

             Eosinophils (test code = 1.4          See_Comment                [A

utomated message] The



             Eosinophils)                                        system which ge

nerated



                                                                 this result tra

nsmitted



                                                                 reference range

: <=4.0.



                                                                 The reference r

zhen was



                                                                 not used to int

erpret



                                                                 this result as



                                                                 normal/abnormal

.



[facility] DallasGlihgepIAVUDENOKZ3206-58-93 00:20:00





             Test Item    Value        Reference Range Interpretation Comments

 

             Basophils (test code = 0.9          See_Comment                [Aut

omated message] The



             Basophils)                                          system which ge

nerated



                                                                 this result tra

nsmitted



                                                                 reference range

: <=1.0.



                                                                 The reference r

zhen was



                                                                 not used to int

erpret



                                                                 this result as



                                                                 normal/abnormal

.



[facility] DallasFyublucCBDWPPARUE0913-89-53 00:20:00





             Test Item    Value        Reference Range Interpretation Comments

 

             Neutrophils # (test code = Neutrophils 6.9          1.5-8.1        

           



             #)                                                  



[facility] DallasCmtxmwdNGARXHBLKC1417-97-12 00:20:00





             Test Item    Value        Reference Range Interpretation Comments

 

             Lymphocytes # (test code = Lymphocytes 2.0          1.0-5.5        

           



             #)                                                  



[facility] DallasHiggkoiQBEMWYWIVI3069-07-80 00:20:00





             Test Item    Value        Reference Range Interpretation Comments

 

             Monocytes # (test code 1.0          See_Comment                [Aut

omated message] The



             = Monocytes #)                                        system which 

generated



                                                                 this result tra

nsmitted



                                                                 reference range

: <=0.8.



                                                                 The reference r

zhen was



                                                                 not used to int

erpret



                                                                 this result as



                                                                 normal/abnormal

.



[facility]he[facility]FIHALXK3252[facility]





             Test Item    Value        Reference Range Interpretation Comments

 

             Eosinophils # (test code 0.1          See_Comment                [A

utomated message] The



             = Eosinophils #)                                        system whic

h generated



                                                                 this result tra

nsmitted



                                                                 reference range

: <=0.5.



                                                                 The reference r

zhen was



                                                                 not used to int

erpret



                                                                 this result as



                                                                 normal/abnormal

.



[facility] HospitalUbxqgdtYAINCYOXOJ7629-45-71 00:20:00





             Test Item    Value        Reference Range Interpretation Comments

 

             Basophils # (test code 0.1          See_Comment                [Aut

omated message] The



             = Basophils #)                                        system which 

generated



                                                                 this result tra

nsmitted



                                                                 reference range

: <=0.2.



                                                                 The reference r

zhen was



                                                                 not used to int

erpret



                                                                 this result as



                                                                 normal/abnormal

.



[facility] HospitalShgpfweFSEDMJDSHE8959-19-76 [facility]:20:00





             Test Item    Value        Reference Range Interpretation Comments

 

             C-REACTIVE PROTEIN (test code = 13.2                               

    



             C-REACTIVE PROTEIN)                                        



[facility] [employer] [id number] 00:20:00





             Test Item    Value        Reference Range Interpretation Comments

 

             ABO/Rh (test code = ABO/Rh) AB POS                                 



[facility] [employer] HURJCQP2364-19-18 00:20:00





             Test Item    Value        Reference Range Interpretation Comments

 

             Antibody Scrn (test Negative (22 7:20                          

 



             code = Antibody Scrn) PM)                                    



[facility]





             Test Item    Value        Reference Range Interpretation Comments

 

             Glucose Lvl (test code = Glucose Lvl) 74           70-99           

          



[facility] [employer] QBSYQ1823-00-55 00:20:00





             Test Item    Value        Reference Range Interpretation Comments

 

             BUN (test code = BUN) 15           7-22                      



[facility] [employer] [id number] 00:20:00





             Test Item    Value        Reference Range Interpretation Comments

 

             Creatinine Lvl (test code = Creatinine 0.61         0.50-1.40      

           



             Lvl)                                                



[facility] [employer] PONHC2663-72-07 00:20:00





             Test Item    Value        Reference Range Interpretation Comments

 

             Sodium Lvl (test code = Sodium Lvl) 139          135-145           

        



[facility] [employer] HQWZE1081-15-71 00:20:00





             Test Item    Value        Reference Range Interpretation Comments

 

             Potassium Lvl (test code = Potassium 4.9          3.5-5.1          

         



             Lvl)                                                



[place] 017232-04-09 00:20:00





             Test Item    Value        Reference Range Interpretation Comments

 

             Chloride Lvl (test code = Chloride Lvl) 105                  

            



[place] 017232-04-09 00:20:00





             Test Item    Value        Reference Range Interpretation Comments

 

             CO2 (test code = CO2) 30           24-32                     



[place] 017232-04-09 00:20:00





             Test Item    Value        Reference Range Interpretation Comments

 

             Calcium Lvl (test code = Calcium Lvl) 9.5          8.5-10.5        

          



[place] 017232-04-09 00:20:00





             Test Item    Value        Reference Range Interpretation Comments

 

             AGAP (test code = AGAP) 8.9          10.0-20.0                 



[place] 017232-04-09 00:20:00





             Test Item    Value        Reference Range Interpretation Comments

 

             eGFR (test code = eGFR) 129                                    



[place] 017232-04-09 00:20:00





             Test Item    Value        Reference Range Interpretation Comments

 

             Lactic Acid Lvl (test code = Lactic 1.2          0.5-2.2           

        



             Acid Lvl)                                           



[place] 017232-04-09 00:20:00





             Test Item    Value        Reference Range Interpretation Comments

 

             Procalcitonin Lvl (test 0.09         See_Comment                [Au

tomated message]



             code = Procalcitonin Lvl)                                        

e system which



                                                                 generated this 

result



                                                                 transmitted ref

erence



                                                                 range: <=0.10. 

The



                                                                 reference range

 was not



                                                                 used to interpr

et this



                                                                 result as



                                                                 normal/abnormal

.



[place] 077282-04-09 00:20:00





             Test Item    Value        Reference Range Interpretation Comments

 

             WBC (test code = WBC) 10.1         3.7-10.4                  



[place] 077282-04-09 00:20:00





             Test Item    Value        Reference Range Interpretation Comments

 

             RBC (test code = RBC) 5.14         4.70-6.10                 



[place] [address]-04-09 00:20:00





             Test Item    Value        Reference Range Interpretation Comments

 

             Hgb (test code = Hgb) 15.5         14.0-18.0                 



[place] [address]-04-09 00:20:00





             Test Item    Value        Reference Range Interpretation Comments

 

             Hct (test code = Hct) 46.5         42.0-54.0                 



[place] [address]-04-09 00:20:00





             Test Item    Value        Reference Range Interpretation Comments

 

             MCV (test code = MCV) 90.5         80.0-94.0                 



[facility] DallasExzfajtJMDMAAXYHZ8196-42-42 00:20:00





             Test Item    Value        Reference Range Interpretation Comments

 

             MCH (test code = MCH) 30.1 pg      27.0-31.0                 



[facility] DallasRnlpozrBSIRSUMGYW1847-98-07 00:20:00





             Test Item    Value        Reference Range Interpretation Comments

 

             MCHC (test code = MCHC) 33.3         32.0-36.0                 



[facility] DallasToyjdxzMROIEWRICZ2681-42-97 00:20:00





             Test Item    Value        Reference Range Interpretation Comments

 

             RDW (test code = RDW) 15.7         11.5-14.5                 



[facility] DallasJkysjmsUMFIZHOKKI6436-83-03 00:20:00





             Test Item    Value        Reference Range Interpretation Comments

 

             Platelet (test code = Platelet) 302          133-450               

    



[facility] DallasJpmzlaqJGPYEWBZZD6534-85-05 00:20:00





             Test Item    Value        Reference Range Interpretation Comments

 

             MPV (test code = MPV) 8.9          7.4-10.4                  



[facility] DallasLtodqgyLUMLZDGDYO1264-37-15 00:20:00





             Test Item    Value        Reference Range Interpretation Comments

 

             Sed Rate (test code = 17           See_Comment                [Auto

mated message] The



             Sed Rate)                                           system which ge

nerated this



                                                                 result transmit

huma



                                                                 reference range

: <=15. The



                                                                 reference range

 was not



                                                                 used to interpr

et this



                                                                 result as zayda

l/abnormal.



[facility] DallasZvvwdhhFKOXDJEHHW3078-65-93 00:20:00





             Test Item    Value        Reference Range Interpretation Comments

 

             PT (test code = PT) 13.1 s       12.0-14.7                 



[facility] WITH [id number] 04:47:32





             Test Item    Value        Reference Range Interpretation Comments

 

             WBC (test code =              See_Comment  H             [Automated



             1214-2)                                             message] The sy

stem



                                                                 which generated



                                                                 this result



                                                                 transmitted



                                                                 reference range

:



                                                                 4.20 - 10.70



                                                                 10*3/?L. The



                                                                 reference range

 was



                                                                 not used to



                                                                 interpret this



                                                                 result as



                                                                 normal/abnormal

.

 

             RBC (test code =              See_Comment                [Automated



             584-8)                                              message] The sy

stem



                                                                 which generated



                                                                 this result



                                                                 transmitted



                                                                 reference range

:



                                                                 4.26 - 5.52



                                                                 10*6/?L. The



                                                                 reference range

 was



                                                                 not used to



                                                                 interpret this



                                                                 result as



                                                                 normal/abnormal

.

 

             HGB (test code = 16.1 g/dL    12.2-16.4                 



             718-7)                                              

 

             HCT (test code = 47.2 %       38.4-49.3                 



             4544-3)                                             

 

             MCV (test code = 86.1 fL      81.7-95.6                 



             787-2)                                              

 

             MCH (test code = 29.4 pg      26.1-32.7                 



             785-6)                                              

 

             MCHC (test code = 34.1 g/dL    31.2-35.0                 



             786-4)                                              

 

             RDW-SD (test code = 45.7 fL      38.5-51.6                 



             58887-8)                                            

 

             RDW-CV (test code = 14.6 %       12.1-15.4                 



             788-0)                                              

 

             PLT (test code =              See_Comment                [Automated



             777-3)                                              message] The sy

stem



                                                                 which generated



                                                                 this result



                                                                 transmitted



                                                                 reference range

:



                                                                 150 - 328 10*3/

?L.



                                                                 The reference r

zhen



                                                                 was not used to



                                                                 interpret this



                                                                 result as



                                                                 normal/abnormal

.

 

             MPV (test code = 11.0 fL      9.8-13.0                  



             21526-2)                                            

 

             NRBC/100 WBC (test              See_Comment                [Automat

ed



             code = [id number])                                        message] 

The system



                                                                 which generated



                                                                 this result



                                                                 transmitted



                                                                 reference range

:



                                                                 0.0 - 10.0 /100



                                                                 WBCs. The refer

ence



                                                                 range was not u

sed



                                                                 to interpret th

is



                                                                 result as



                                                                 normal/abnormal

.

 

             NRBC x10^3 (test code <0.01        See_Comment                [Auto

mated



             = [id number])                                        message] The s

ystem



                                                                 which generated



                                                                 this result



                                                                 transmitted



                                                                 reference range

:



                                                                 10*3/?L. The



                                                                 reference range

 was



                                                                 not used to



                                                                 interpret this



                                                                 result as



                                                                 normal/abnormal

.

 

             GRAN MAT (NEUT) % 72.2 %                                 



             (test code = 770-8)                                        

 

             IMM GRAN % (test code 0.60 %                                 



             = [id number])                                        

 

             LYMPH % (test code = 17.7 %                                 



             736-9)                                              

 

             MONO % (test code = 7.4 %                                  



             5905-5)                                             

 

             EOS % (test code = 1.8 %                                  



             713-8)                                              

 

             BASO % (test code = 0.3 %                                  



             706-2)                                              

 

             GRAN MAT x10^3(ANC) 9.09 10*3/uL 1.99-6.95    H            



             (test code =                                        



             7598148704)                                         

 

             IMM GRAN x10^3 (test 0.07 10*3/uL 0.00-0.06    H            



             code = 3163020672)                                        

 

             LYMPH x10^3 (test code 2.22 10*3/uL 1.09-3.23                 



             = 731-0)                                            

 

             MONO x10^3 (test code 0.93 10*3/uL 0.36-1.02                 



             = 742-7)                                            

 

             EOS x10^3 (test code = 0.22 10*3/uL 0.06-0.53                 



             711-2)                                              

 

             BASO x10^3 (test code 0.04 10*3/uL 0.01-0.09                 



             = 704-7)                                            

 

             Lab Interpretation Abnormal                               



             (test code = 19902-3)                                        



[id number] 
[facility]36:41





             Test Item    Value        Reference Range Interpretation Comments

 

             NA (test code = 138 mmol/L   135-145                   



             8316037339)                                         

 

             K (test code = 4.6 mmol/L   3.5-5.0                   



             5408861990)                                         

 

             CL (test code = 105 mmol/L                       



             7570325861)                                         

 

             CO2 TOTAL (test code = 21 mmol/L    23-31        L            



             5663636476)                                         

 

             AGAP (test code =              2-16                      



             8119934059)                                         

 

             BUN (test code = 13 mg/dL     7-23                      



             3388327817)                                         

 

             GLUCOSE (test code = 167 mg/dL           H            



             9259842755)                                         

 

             CREATININE (test code = 0.48 mg/dL   0.60-1.25    L            



             9658650150)                                         

 

             TOTAL BILI (test code = 0.8 mg/dL    0.1-1.1                   



             9681338177)                                         

 

             CALCIUM (test code = 9.3 mg/dL    8.6-10.6                  



             3639938743)                                         

 

             T PROTEIN (test code = 7.6 g/dL     6.3-8.2                   



             7281017787)                                         

 

             ALBUMIN (test code = 4.2 g/dL     3.5-5.0                   



             2571307589)                                         

 

             ALK PHOS (test code = 98 U/L                           



             3774463942)                                         

 

             ALTv (test code = 71 U/L       5-50         H            



             1742-6)                                             

 

             AST(SGOT) (test code = 36 U/L       13-40                     



             3246767239)                                         

 

             eGFR (test code =              mL/min/1.73m2              



             4027937121)                                         

 

             MAL (test code = MAL) Association of                           



                          Glomerular Filtration                           



                          Rate (GFR) and Staging                           



                          of Kidney Disease*                           



                          +---------------------                           



                          --+-------------------                           



                          --+-------------------                           



                          ------+| GFR                           



                          (mL/min/1.73 m2) ?|                           



                          With Kidney Damage ?|                           



                          ?Without Kidney                           



                          Damage+---------------                           



                          --------+-------------                           



                          --------+-------------                           



                          ------------+| ?>90 ?                           



                          ? ? ? ? ? ? ? ?|                           



                          ?Stage one ? ? ? ? ?|                           



                          ? Normal ? ? ? ? ? ? ?                           



                          ?+--------------------                           



                          ---+------------------                           



                          ---+------------------                           



                          -------+| ?60-89 ? ? ?                           



                          ? ? ? ? ?| ?Stage two                           



                          ? ? ? ? ?| ? Decreased                           



                          GFR ? ? ? ?                            



                          +---------------------                           



                          --+-------------------                           



                          --+-------------------                           



                          ------+| ?30-59 ? ? ?                           



                          ? ? ? ? ?| ?Stage                           



                          three ? ? ? ?| ? Stage                           



                          three ? ? ? ? ?                           



                          +---------------------                           



                          --+-------------------                           



                          --+-------------------                           



                          ------+| ?15-29 ? ? ?                           



                          ? ? ? ? ?| ?Stage four                           



                          ? ? ? ? | ? Stage four                           



                          ? ? ? ? ?                              



                          ?+--------------------                           



                          ---+------------------                           



                          ---+------------------                           



                          -------+| ?<15 (or                           



                          dialysis) ? ?| ?Stage                           



                          five ? ? ? ? | ? Stage                           



                          five ? ? ? ? ?                           



                          ?+--------------------                           



                          ---+------------------                           



                          ---+------------------                           



                          -------+ *Each stage                           



                          assumes the associated                           



                          GFR level has been in                           



                          effect for at least                           



                          three months. ?Stages                           



                          1 to 5, with or                           



                          without kidney                           



                          disease, indicate                           



                          chronic kidney                           



                          disease. Notes:                           



                          Determination of                           



                          stages one and two                           



                          (with eGFR                             



                          >59mL/min/1.73 m2)                           



                          requires estimation of                           



                          kidney damage for at                           



                          least three months as                           



                          defined by structural                           



                          or functional                           



                          abnormalities of the                           



                          kidney, manifested by                           



                          either:Pathological                           



                          abnormalities or                           



                          Markers of kidney                           



                          damage (including                           



                          abnormalities in the                           



                          composition of the                           



                          blood or urine or                           



                          abnormalities in                           



                          imaging tests).                           

 

             Lab Interpretation Abnormal                               



             (test code = 66586-7)                                        



[facility]





             Test Item    Value        Reference Range Interpretation Comments

 

             MAGNESIUM (test code = 7396636040) 1.6 mg/dL    1.7-2.4      L     

       

 

             Lab Interpretation (test code = Abnormal                           

    



             07552-0)                                            



[facility] [id number] 07:52:00





             Test Item    Value        Reference Range Interpretation Comments

 

             ABO/Rh (test code = ABO/Rh) AB POS                                 



[facility] [employer] [id number] 07:52:00





             Test Item    Value        Reference Range Interpretation Comments

 

             Antibody Scrn (test Negative (3/28/22 2:52                         

  



             code = Antibody Scrn) AM)                                    



[employer] [id number] 07:52:00





             Test Item    Value        Reference Range Interpretation Comments

 

             Glucose Lvl (test code = Glucose Lvl) 291          70-99           

          



[place] 017232-03-28 07:52:00





             Test Item    Value        Reference Range Interpretation Comments

 

             BUN (test code = BUN) 16           7-22                      



[place] 017232-03-28 07:52:00





             Test Item    Value        Reference Range Interpretation Comments

 

             Creatinine Lvl (test code = Creatinine 0.83         0.50-1.40      

           



             Lvl)                                                



[place] 017232-03-28 07:52:00





             Test Item    Value        Reference Range Interpretation Comments

 

             Sodium Lvl (test code = Sodium Lvl) 138          135-145           

        



[place] 017232-03-28 07:52:00





             Test Item    Value        Reference Range Interpretation Comments

 

             Potassium Lvl (test code = Potassium 4.7          3.5-5.1          

         



             Lvl)                                                



[place] 017232-03-28 07:52:00





             Test Item    Value        Reference Range Interpretation Comments

 

             Chloride Lvl (test code = Chloride Lvl) 113                  

            



[place] 017232-03-28 07:52:00





             Test Item    Value        Reference Range Interpretation Comments

 

             CO2 (test code = CO2) 18           24-32                     



[place] 017232-03-28 07:52:00





             Test Item    Value        Reference Range Interpretation Comments

 

             AGAP (test code = AGAP) 11.7         10.0-20.0                 



[place] 017232-03-28 07:52:00





             Test Item    Value        Reference Range Interpretation Comments

 

             Calcium Lvl (test code = Calcium Lvl) 9.4          8.5-10.5        

          



[place] 017232-03-28 07:52:00





             Test Item    Value        Reference Range Interpretation Comments

 

             eGFR (test code = eGFR) 113                                    



[place] [address]-03-28 07:52:00





             Test Item    Value        Reference Range Interpretation Comments

 

             WBC (test code = WBC) 5.5          3.7-10.4                  



[place] [address]-03-28 07:52:00





             Test Item    Value        Reference Range Interpretation Comments

 

             RBC (test code = RBC) 5.53         4.70-6.10                 



[place] [address]-03-28 07:52:00





             Test Item    Value        Reference Range Interpretation Comments

 

             Hgb (test code = Hgb) 16.3         14.0-18.0                 



[place] [address]-03-28 07:52:00





             Test Item    Value        Reference Range Interpretation Comments

 

             Hct (test code = Hct) 50.5         42.0-54.0                 



[facility] ThlfhebEBVKCKCXBE3627-01-52 07:52:00





             Test Item    Value        Reference Range Interpretation Comments

 

             MCV (test code = MCV) 91.3         80.0-94.0                 



[facility] IievmpdJUZTEVLVHT1673-43-01 07:52:00





             Test Item    Value        Reference Range Interpretation Comments

 

             MCH (test code = MCH) 29.5 pg      27.0-31.0                 



[facility] OvoqsmhMHTAKUCLPY1187-24-47 07:52:00





             Test Item    Value        Reference Range Interpretation Comments

 

             MCHC (test code = MCHC) 32.3         32.0-36.0                 



[facility] OubgessNNWILGIOAX2606-59-03 07:52:00





             Test Item    Value        Reference Range Interpretation Comments

 

             RDW (test code = RDW) 15.4         11.5-14.5                 



[facility] WsaufiqNLUQGPRKRK3926-50-60 07:52:00





             Test Item    Value        Reference Range Interpretation Comments

 

             Platelet (test code = Platelet) 210          133-450               

    



[facility] PugroykMIHLHOIGWT3503-40-33 07:52:00





             Test Item    Value        Reference Range Interpretation Comments

 

             MPV (test code = MPV) 9.3          7.4-10.4                  



[facility] [employer] [id number] 07:52:00





             Test Item    Value        Reference Range Interpretation Comments

 

             ABO/Rh (test code = ABO/Rh) AB POS                                 



[employer] [id number] 07:52:00





             Test Item    Value        Reference Range Interpretation Comments

 

             Antibody Scrn (test Negative (3/28/22 2:52                         

  



             code = Antibody Scrn) AM)                                    



[employer] [id number] 07:52:00





             Test Item    Value        Reference Range Interpretation Comments

 

             Glucose Lvl (test code = Glucose Lvl) 291          70-99           

          



[facility] [employer]2022-03-28 07:52:00





             Test Item    Value        Reference Range Interpretation Comments

 

             BUN (test code = BUN) 16           7-22                      



[employer] [id number] 07:52:00





             Test Item    Value        Reference Range Interpretation Comments

 

             Creatinine Lvl (test code = Creatinine 0.83         0.50-1.40      

           



             Lvl)                                                



[employer]2022-03-28 07:52:00





             Test Item    Value        Reference Range Interpretation Comments

 

             Sodium Lvl (test code = Sodium Lvl) 138          135-145           

        



[facility] [place] [id number] 07:52:00





             Test Item    Value        Reference Range Interpretation Comments

 

             Potassium Lvl (test code = Potassium 4.7          3.5-5.1          

         



             Lvl)                                                



[place] 017232-03-28 07:52:00





             Test Item    Value        Reference Range Interpretation Comments

 

             Chloride Lvl (test code = Chloride Lvl) 113                  

            



[place] 017232-03-28 07:52:00





             Test Item    Value        Reference Range Interpretation Comments

 

             CO2 (test code = CO2) 18           24-32                     



[place] 017232-03-28 07:52:00





             Test Item    Value        Reference Range Interpretation Comments

 

             AGAP (test code = AGAP) 11.7         10.0-20.0                 



[place] 017232-03-28 07:52:00





             Test Item    Value        Reference Range Interpretation Comments

 

             Calcium Lvl (test code = Calcium Lvl) 9.4          8.5-10.5        

          



[place] 017232-03-28 07:52:00





             Test Item    Value        Reference Range Interpretation Comments

 

             eGFR (test code = eGFR) 113                                    



[place] 077282-03-28 07:52:00





             Test Item    Value        Reference Range Interpretation Comments

 

             WBC (test code = WBC) 5.5          3.7-10.4                  



[place] [address]-03-28 07:52:00





             Test Item    Value        Reference Range Interpretation Comments

 

             RBC (test code = RBC) 5.53         4.70-6.10                 



[place] [address]-03-28 07:52:00





             Test Item    Value        Reference Range Interpretation Comments

 

             Hgb (test code = Hgb) 16.3         14.0-18.0                 



[place] [address]-03-28 07:52:00





             Test Item    Value        Reference Range Interpretation Comments

 

             Hct (test code = Hct) 50.5         42.0-54.0                 



[place] [address]-03-28 07:52:00





             Test Item    Value        Reference Range Interpretation Comments

 

             MCV (test code = MCV) 91.3         80.0-94.0                 



[place] [address]-03-28 07:52:00





             Test Item    Value        Reference Range Interpretation Comments

 

             MCH (test code = MCH) 29.5 pg      27.0-31.0                 



[place] 077282-03-28 07:52:00





             Test Item    Value        Reference Range Interpretation Comments

 

             MCHC (test code = MCHC) 32.3         32.0-36.0                 



[facility] EgvxtykSGASVZQPPM4646-85-91 07:52:00





             Test Item    Value        Reference Range Interpretation Comments

 

             RDW (test code = RDW) 15.4         11.5-14.5                 



[facility]





             Test Item    Value        Reference Range Interpretation Comments

 

             Platelet (test code = Platelet) 210          133-450               

    



[facility]08[facility]20[facility]97 07:52:00





             Test Item    Value        Reference Range Interpretation Comments

 

             MPV (test code = MPV) 9.3          7.4-10.4                  



[facility] [employer] [id number] 07:52:00





             Test Item    Value        Reference Range Interpretation Comments

 

             ABO/Rh (test code = ABO/Rh) AB POS                                 



[facility] [employer] [id number] 07:52:00





             Test Item    Value        Reference Range Interpretation Comments

 

             Antibody Scrn (test Negative (3/28/22 2:52                         

  



             code = Antibody Scrn) AM)                                    



[facility] [employer] [id number] 07:52:00





             Test Item    Value        Reference Range Interpretation Comments

 

             Glucose Lvl (test code = Glucose Lvl) 291          70-99           

          



[facility] [employer] [id number] 07:52:00





             Test Item    Value        Reference Range Interpretation Comments

 

             BUN (test code = BUN) 16           7-22                      



[facility] [employer] [id number] 07:52:00





             Test Item    Value        Reference Range Interpretation Comments

 

             Creatinine Lvl (test code = Creatinine 0.83         0.50-1.40      

           



             Lvl)                                                



[facility] [employer] [id number] 07:52:00





             Test Item    Value        Reference Range Interpretation Comments

 

             Sodium Lvl (test code = Sodium Lvl) 138          135-145           

        



[facility] [employer] [id number] 07:52:00





             Test Item    Value        Reference Range Interpretation Comments

 

             Potassium Lvl (test code = Potassium 4.7          3.5-5.1          

         



             Lvl)                                                



[facility] [employer] [id number] 07:52:00





             Test Item    Value        Reference Range Interpretation Comments

 

             Chloride Lvl (test code = Chloride Lvl) 113                  

            



[facility] [employer] [id number] 07:52:00





             Test Item    Value        Reference Range Interpretation Comments

 

             CO2 (test code = CO2) 18           24-32                     



[facility] [employer] [id number] 07:52:00





             Test Item    Value        Reference Range Interpretation Comments

 

             AGAP (test code = AGAP) 11.7         10.0-20.0                 



[facility]2022-03-28 07:52:00





             Test Item    Value        Reference Range Interpretation Comments

 

             Calcium Lvl (test code = Calcium Lvl) 9.4          8.5-10.5        

          



[facility] [facility]2022-03-28 07:52:00





             Test Item    Value        Reference Range Interpretation Comments

 

             eGFR (test code = eGFR) 113                                    



[id number]





             Test Item    Value        Reference Range Interpretation Comments

 

             WBC (test code = WBC) 5.5          3.7-10.4                  



[place] 077282-03-28 07:52:00





             Test Item    Value        Reference Range Interpretation Comments

 

             RBC (test code = RBC) 5.53         4.70-6.10                 



[place] 077282-03-28 07:52:00





             Test Item    Value        Reference Range Interpretation Comments

 

             Hgb (test code = Hgb) 16.3         14.0-18.0                 



[place] 077282-03-28 07:52:00





             Test Item    Value        Reference Range Interpretation Comments

 

             Hct (test code = Hct) 50.5         42.0-54.0                 



[place] 077282-03-28 07:52:00





             Test Item    Value        Reference Range Interpretation Comments

 

             MCV (test code = MCV) 91.3         80.0-94.0                 



[place] 077282-03-28 07:52:00





             Test Item    Value        Reference Range Interpretation Comments

 

             MCH (test code = MCH) 29.5 pg      27.0-31.0                 



[place] 077282-03-28 07:52:00





             Test Item    Value        Reference Range Interpretation Comments

 

             MCHC (test code = MCHC) 32.3         32.0-36.0                 



[place] 077282-03-28 07:52:00





             Test Item    Value        Reference Range Interpretation Comments

 

             RDW (test code = RDW) 15.4         11.5-14.5                 



[place] 077282-03-28 07:52:00





             Test Item    Value        Reference Range Interpretation Comments

 

             Platelet (test code = Platelet) 210          133-450               

    



[place] 077282-03-28 07:52:00





             Test Item    Value        Reference Range Interpretation Comments

 

             MPV (test code = MPV) 9.3          7.4-10.4                  



[facility] [id number] 07:52:00





             Test Item    Value        Reference Range Interpretation Comments

 

             ABO/Rh (test code = ABO/Rh) AB POS                                 



[facility] [id number] 07:52:00





             Test Item    Value        Reference Range Interpretation Comments

 

             Antibody Scrn (test Negative (3/28/22 2:52                         

  



             code = Antibody Scrn) AM)                                    



[facility] [id number]52:00





             Test Item    Value        Reference Range Interpretation Comments

 

             Glucose Lvl (test code = Glucose Lvl) 291          70-99           

          



[facility] [id number] 07:52:00





             Test Item    Value        Reference Range Interpretation Comments

 

             BUN (test code = BUN) 16           -                      



[facility] [id number] 07:52:00





             Test Item    Value        Reference Range Interpretation Comments

 

             Creatinine Lvl (test code = Creatinine 0.83         0.50-1.40      

           



             Lvl)                                                



[facility] [id number] 07:52:00





             Test Item    Value        Reference Range Interpretation Comments

 

             Sodium Lvl (test code = Sodium Lvl) 138          135-145           

        



[facility] [id number] 07:52:00





             Test Item    Value        Reference Range Interpretation Comments

 

             Potassium Lvl (test code = Potassium 4.7          3.5-5.1          

         



             Lvl)                                                



[facility] [id number] 07:52:00





             Test Item    Value        Reference Range Interpretation Comments

 

             Chloride Lvl (test code = Chloride Lvl) 113                  

            



[facility] [id number] 07:52:00





             Test Item    Value        Reference Range Interpretation Comments

 

             CO2 (test code = CO2) 18           24-32                     



[facility] [id number] 07:52:00





             Test Item    Value        Reference Range Interpretation Comments

 

             AGAP (test code = AGAP) 11.7         10.0-20.0                 



[facility] [employer] [id number] 07:52:00





             Test Item    Value        Reference Range Interpretation Comments

 

             Calcium Lvl (test code = Calcium Lvl) 9.4          8.5-10.5        

          



[facility] [id number] 07:52:00





             Test Item    Value        Reference Range Interpretation Comments

 

             eGFR (test code = eGFR) 113                                    



[facility]





             Test Item    Value        Reference Range Interpretation Comments

 

             WBC (test code = WBC) 5.5          3.7-10.4                  



[facility]3747[facility]:52:00





             Test Item    Value        Reference Range Interpretation Comments

 

             RBC (test code = RBC) 5.53         4.70-6.10                 



[facility]UnpwqttZQEISQDIES7352-75-81 07:52:00





             Test Item    Value        Reference Range Interpretation Comments

 

             Hgb (test code = Hgb) 16.3         14.0-18.0                 



[facility]FtqaahoWFPSFFFPEZ5511-81-09 07:52:00





             Test Item    Value        Reference Range Interpretation Comments

 

             Hct (test code = Hct) 50.5         42.0-54.0                 



[facility] HospitalHwbhsqjCQDTRCJMZF1415-55-99 07:52:00





             Test Item    Value        Reference Range Interpretation Comments

 

             MCV (test code = MCV) 91.3         80.0-94.0                 



[facility] HospitalBipyctfTONHZOQFUR7305-87-28 07:52:00





             Test Item    Value        Reference Range Interpretation Comments

 

             MCH (test code = MCH) 29.5 pg      27.0-31.0                 



[facility] HospitalBwdcbsbVVCFRRVTLE8065-25-08 07:52:00





             Test Item    Value        Reference Range Interpretation Comments

 

             MCHC (test code = MCHC) 32.3         32.0-36.0                 



[facility] HospitalLktcdsoUPGTFFMVOD0830-86-80 07:52:00





             Test Item    Value        Reference Range Interpretation Comments

 

             RDW (test code = RDW) 15.4         11.5-14.5                 



[facility] HospitalUbvheudINYPHYNNBT3887-05-99 07:52:00





             Test Item    Value        Reference Range Interpretation Comments

 

             Platelet (test code = Platelet) 210          133-450               

    



[facility] DallasPdkrycuCOKZEYQIOI7807-10-58 07:52:00





             Test Item    Value        Reference Range Interpretation Comments

 

             MPV (test code = MPV) 9.3          7.4-10.4                  



[facility] [id number] 07:52:00





             Test Item    Value        Reference Range Interpretation Comments

 

             ABO/Rh (test code = ABO/Rh) AB POS                                 



[facility] [employer] [id number] 07:52:00





             Test Item    Value        Reference Range Interpretation Comments

 

             Antibody Scrn (test Negative (3/28/22 2:52                         

  



             code = Antibody Scrn) AM)                                    



[facility] [id number] 07:52:00





             Test Item    Value        Reference Range Interpretation Comments

 

             Glucose Lvl (test code = Glucose Lvl) 291          70-99           

          



[place] 017232-03-28 07:52:00





             Test Item    Value        Reference Range Interpretation Comments

 

             BUN (test code = BUN) 16           7-22                      



[place] 017232-03-28 07:52:00





             Test Item    Value        Reference Range Interpretation Comments

 

             Creatinine Lvl (test code = Creatinine 0.83         0.50-1.40      

           



             Lvl)                                                



[place] 017232-03-28 07:52:00





             Test Item    Value        Reference Range Interpretation Comments

 

             Sodium Lvl (test code = Sodium Lvl) 138          135-145           

        



[place] 017232-03-28 07:52:00





             Test Item    Value        Reference Range Interpretation Comments

 

             Potassium Lvl (test code = Potassium 4.7          3.5-5.1          

         



             Lvl)                                                



[place] 017232-03-28 07:52:00





             Test Item    Value        Reference Range Interpretation Comments

 

             Chloride Lvl (test code = Chloride Lvl) 113                  

            



[place] 017232-03-28 07:52:00





             Test Item    Value        Reference Range Interpretation Comments

 

             CO2 (test code = CO2) 18           24-32                     



[place] 017232-03-28 07:52:00





             Test Item    Value        Reference Range Interpretation Comments

 

             AGAP (test code = AGAP) 11.7         10.0-20.0                 



[place] 017232-03-28 07:52:00





             Test Item    Value        Reference Range Interpretation Comments

 

             Calcium Lvl (test code = Calcium Lvl) 9.4          8.5-10.5        

          



[place] 017232-03-28 07:52:00





             Test Item    Value        Reference Range Interpretation Comments

 

             eGFR (test code = eGFR) 113                                    



[place] [address]-03-28 07:52:00





             Test Item    Value        Reference Range Interpretation Comments

 

             WBC (test code = WBC) 5.5          3.7-10.4                  



[place] [address]-03-28 07:52:00





             Test Item    Value        Reference Range Interpretation Comments

 

             RBC (test code = RBC) 5.53         4.70-6.10                 



[place] [address]-03-28 07:52:00





             Test Item    Value        Reference Range Interpretation Comments

 

             Hgb (test code = Hgb) 16.3         14.0-18.0                 



[place] 077282-03-28 07:52:00





             Test Item    Value        Reference Range Interpretation Comments

 

             Hct (test code = Hct) 50.5         42.0-54.0                 



[facility] H[id number] 07:52:00





             Test Item    Value        Reference Range Interpretation Comments

 

             MCV (test code = MCV) 91.3         80.0-94.0                 



[facility] HospitalDpgxnfgSHXOMFMKFI0306-04-03 07:52:00





             Test Item    Value        Reference Range Interpretation Comments

 

             MCH (test code = MCH) 29.5 pg      27.0-31.0                 



[facility] HospitalEgqxwjnDALRUZWLKR3695-04-81 07:52:00





             Test Item    Value        Reference Range Interpretation Comments

 

             MCHC (test code = MCHC) 32.3         32.0-36.0                 



[facility] HospitalReiedgwFJNUNPWGKN2435-13-95 07:52:00





             Test Item    Value        Reference Range Interpretation Comments

 

             RDW (test code = RDW) 15.4         11.5-14.5                 



[facility] HospitalWjrcuqdPIBFATJYSC3560-62-99 07:52:00





             Test Item    Value        Reference Range Interpretation Comments

 

             Platelet (test code = Platelet) 210          133-450               

    



[facility] HospitalUpgdpjcSIWHMCTWBY3308-61-06 07:52:00





             Test Item    Value        Reference Range Interpretation Comments

 

             MPV (test code = MPV) 9.3          7.4-10.4                  



[facility] [employer] [id number] 07:52:00





             Test Item    Value        Reference Range Interpretation Comments

 

             ABO/Rh (test code = ABO/Rh) AB POS                                 



[facility] [employer] [id number] 07:52:00





             Test Item    Value        Reference Range Interpretation Comments

 

             Antibody Scrn (test Negative (3/28/22 2:52                         

  



             code = Antibody Scrn) AM)                                    



[facility] [employer] [id number] 07:52:00





             Test Item    Value        Reference Range Interpretation Comments

 

             Glucose Lvl (test code = Glucose Lvl) 291          70-99           

          



[facility] [employer] [id number] 07:52:00





             Test Item    Value        Reference Range Interpretation Comments

 

             BUN (test code = BUN) 16           7-22                      



[facility] [employer] [id number] 07:52:00





             Test Item    Value        Reference Range Interpretation Comments

 

             Creatinine Lvl (test code = Creatinine 0.83         0.50-1.40      

           



             Lvl)                                                



[facility] [id number] 07:52:00





             Test Item    Value        Reference Range Interpretation Comments

 

             Sodium Lvl (test code = Sodium Lvl) 138          135-145           

        



[place] 017232-03-28 07:52:00





             Test Item    Value        Reference Range Interpretation Comments

 

             Potassium Lvl (test code = Potassium 4.7          3.5-5.1          

         



             Lvl)                                                



[place] 017232-03-28 07:52:00





             Test Item    Value        Reference Range Interpretation Comments

 

             Chloride Lvl (test code = Chloride Lvl) 113                  

            



[place] 017232-03-28 07:52:00





             Test Item    Value        Reference Range Interpretation Comments

 

             CO2 (test code = CO2) 18           24-32                     



[place] 017232-03-28 07:52:00





             Test Item    Value        Reference Range Interpretation Comments

 

             AGAP (test code = AGAP) 11.7         10.0-20.0                 



[place] 017232-03-28 07:52:00





             Test Item    Value        Reference Range Interpretation Comments

 

             Calcium Lvl (test code = Calcium Lvl) 9.4          8.5-10.5        

          



[place] 017232-03-28 07:52:00





             Test Item    Value        Reference Range Interpretation Comments

 

             eGFR (test code = eGFR) 113                                    



[place] 077282-03-28 07:52:00





             Test Item    Value        Reference Range Interpretation Comments

 

             WBC (test code = WBC) 5.5          3.7-10.4                  



[place] [address]-03-28 07:52:00





             Test Item    Value        Reference Range Interpretation Comments

 

             RBC (test code = RBC) 5.53         4.70-6.10                 



[place] [address]-03-28 07:52:00





             Test Item    Value        Reference Range Interpretation Comments

 

             Hgb (test code = Hgb) 16.3         14.0-18.0                 



[place] [address]-03-28 07:52:00





             Test Item    Value        Reference Range Interpretation Comments

 

             Hct (test code = Hct) 50.5         42.0-54.0                 



[place] [address]-03-28 07:52:00





             Test Item    Value        Reference Range Interpretation Comments

 

             MCV (test code = MCV) 91.3         80.0-94.0                 



[place] [address]-03-28 07:52:00





             Test Item    Value        Reference Range Interpretation Comments

 

             MCH (test code = MCH) 29.5 pg      27.0-31.0                 



[place] [address]-03-28 07:52:00





             Test Item    Value        Reference Range Interpretation Comments

 

             MCHC (test code = MCHC) 32.3         32.0-36.0                 



[facility] SnxvtviCXBCQWMWPB3517-52-88 07:52:00





             Test Item    Value        Reference Range Interpretation Comments

 

             RDW (test code = RDW) 15.4         11.5-14.5                 



[facility] HospitalPmsbkddQVGTKJECJT0697-36-97 07:52:00





             Test Item    Value        Reference Range Interpretation Comments

 

             Platelet (test code = Platelet) 210          133-450               

    



[facility] HospitalMbwnxzuZTHJFKFTPR8535-11-80 07:52:00





             Test Item    Value        Reference Range Interpretation Comments

 

             MPV (test code = MPV) 9.3          7.4-10.4                  



[facility] [employer] [id number] 07:52:00





             Test Item    Value        Reference Range Interpretation Comments

 

             ABO/Rh (test code = ABO/Rh) AB POS                                 



[facility] [employer] [id number] 07:52:00





             Test Item    Value        Reference Range Interpretation Comments

 

             Antibody Scrn (test Negative (3/28/22 2:52                         

  



             code = Antibody Scrn) AM)                                    



[facility] [employer] [id number] 07:52:00





             Test Item    Value        Reference Range Interpretation Comments

 

             Glucose Lvl (test code = Glucose Lvl) 291          70-99           

          



[facility] [employer] [id number] 07:52:00





             Test Item    Value        Reference Range Interpretation Comments

 

             BUN (test code = BUN) 16           7-22                      



[facility] [employer] [id number] 07:52:00





             Test Item    Value        Reference Range Interpretation Comments

 

             Creatinine Lvl (test code = Creatinine 0.83         0.50-1.40      

           



             Lvl)                                                



[facility] [employer] [id number] 07:52:00





             Test Item    Value        Reference Range Interpretation Comments

 

             Sodium Lvl (test code = Sodium Lvl) 138          135-145           

        



[facility] [employer] [id number] 07:52:00





             Test Item    Value        Reference Range Interpretation Comments

 

             Potassium Lvl (test code = Potassium 4.7          3.5-5.1          

         



             Lvl)                                                



[facility] [employer] [id number] 07:52:00





             Test Item    Value        Reference Range Interpretation Comments

 

             Chloride Lvl (test code = Chloride Lvl) 113                  

            



[facility] [employer] [id number] 07:52:00





             Test Item    Value        Reference Range Interpretation Comments

 

             CO2 (test code = CO2) 18           24-32                     



[facility] [employer] [id number] 07:52:00





             Test Item    Value        Reference Range Interpretation Comments

 

             AGAP (test code = AGAP) 11.7         10.0-20.0                 



[facility]2022-03-28 07:52:00





             Test Item    Value        Reference Range Interpretation Comments

 

             Calcium Lvl (test code = Calcium Lvl) 9.4          8.5-10.5        

          



[facility]2022-03-28 07:52:00





             Test Item    Value        Reference Range Interpretation Comments

 

             eGFR (test code = eGFR) 113                                    



[facility]PsedomrFLDFAOSMDK7630-18-31 07:52:00





             Test Item    Value        Reference Range Interpretation Comments

 

             WBC (test code = WBC) 5.5          3.7-10.4                  



[place] 077282-03-28 07:52:00





             Test Item    Value        Reference Range Interpretation Comments

 

             RBC (test code = RBC) 5.53         4.70-6.10                 



[place] 077282-03-28 07:52:00





             Test Item    Value        Reference Range Interpretation Comments

 

             Hgb (test code = Hgb) 16.3         14.0-18.0                 



[place] 077282-03-28 07:52:00





             Test Item    Value        Reference Range Interpretation Comments

 

             Hct (test code = Hct) 50.5         42.0-54.0                 



[place] 077282-03-28 07:52:00





             Test Item    Value        Reference Range Interpretation Comments

 

             MCV (test code = MCV) 91.3         80.0-94.0                 



[place] 077282-03-28 07:52:00





             Test Item    Value        Reference Range Interpretation Comments

 

             MCH (test code = MCH) 29.5 pg      27.0-31.0                 



[place] 077282-03-28 07:52:00





             Test Item    Value        Reference Range Interpretation Comments

 

             MCHC (test code = MCHC) 32.3         32.0-36.0                 



[place] 077282-03-28 07:52:00





             Test Item    Value        Reference Range Interpretation Comments

 

             RDW (test code = RDW) 15.4         11.5-14.5                 



[facility] Dall[id number] 07:52:00





             Test Item    Value        Reference Range Interpretation Comments

 

             Platelet (test code = Platelet) 210          133-450               

    



[place] 077282-03-28 07:52:00





             Test Item    Value        Reference Range Interpretation Comments

 

             MPV (test code = MPV) 9.3          7.4-10.4                  



[place] 017232-03-27 10:12:00





             Test Item    Value        Reference Range Interpretation Comments

 

             Glucose Lvl (test code = Glucose Lvl) 115          70-99           

          



[place] 017232-03-27 10:12:00





             Test Item    Value        Reference Range Interpretation Comments

 

             BUN (test code = BUN) 18           7-22                      



[place] 017232-03-27 10:12:00





             Test Item    Value        Reference Range Interpretation Comments

 

             Creatinine Lvl (test code = Creatinine 0.78         0.50-1.40      

           



             Lvl)                                                



[place] 017232-03-27 10:12:00





             Test Item    Value        Reference Range Interpretation Comments

 

             Sodium Lvl (test code = Sodium Lvl) 138          135-145           

        



[place] 017232-03-27 10:12:00





             Test Item    Value        Reference Range Interpretation Comments

 

             Potassium Lvl (test code = Potassium 4.6          3.5-5.1          

         



             Lvl)                                                



[place] 017232-03-27 10:12:00





             Test Item    Value        Reference Range Interpretation Comments

 

             Chloride Lvl (test code = Chloride Lvl) 111                  

            



[place] 017232-03-27 10:12:00





             Test Item    Value        Reference Range Interpretation Comments

 

             CO2 (test code = CO2) 21           24-32                     



[place] 017232-03-27 10:12:00





             Test Item    Value        Reference Range Interpretation Comments

 

             AGAP (test code = AGAP) 10.6         10.0-20.0                 



[place] 017232-03-27 10:12:00





             Test Item    Value        Reference Range Interpretation Comments

 

             Calcium Lvl (test code = Calcium Lvl) 8.6          8.5-10.5        

          



[place] 017232-03-27 10:12:00





             Test Item    Value        Reference Range Interpretation Comments

 

             eGFR (test code = eGFR) 116                                    



[place] 077282-03-27 10:12:00





             Test Item    Value        Reference Range Interpretation Comments

 

             WBC (test code = WBC) 8.2          3.7-10.4                  



[place] 077282-03-27 10:12:00





             Test Item    Value        Reference Range Interpretation Comments

 

             RBC (test code = RBC) 5.14         4.70-6.10                 



[place] 077282-03-27 10:12:00





             Test Item    Value        Reference Range Interpretation Comments

 

             Hgb (test code = Hgb) 15.5         14.0-18.0                 



[place] [address]-03-27 10:12:00





             Test Item    Value        Reference Range Interpretation Comments

 

             Hct (test code = Hct) 46.0         42.0-54.0                 



[place] [address]-03-27 10:12:00





             Test Item    Value        Reference Range Interpretation Comments

 

             MCV (test code = MCV) 89.5         80.0-94.0                 



[place] [address]-03-27 10:12:00





             Test Item    Value        Reference Range Interpretation Comments

 

             MCH (test code = MCH) 30.2 pg      27.0-31.0                 



[place] [address]-03-27 10:12:00





             Test Item    Value        Reference Range Interpretation Comments

 

             MCHC (test code = MCHC) 33.8         32.0-36.0                 



[place] [address]-03-27 10:12:00





             Test Item    Value        Reference Range Interpretation Comments

 

             RDW (test code = RDW) 15.3         11.5-14.5                 



[place] [address]-03-27 10:12:00





             Test Item    Value        Reference Range Interpretation Comments

 

             Platelet (test code = Platelet) 358          133-450               

    



[place] [address]-03-27 10:12:00





             Test Item    Value        Reference Range Interpretation Comments

 

             MPV (test code = MPV) 8.3          7.4-10.4                  



[facility]2022-03-27 10:12:00





             Test Item    Value        Reference Range Interpretation Comments

 

             Glucose Lvl (test code = Glucose Lvl) 115          70-99           

          



[place] 017232-03-27 10:12:00





             Test Item    Value        Reference Range Interpretation Comments

 

             BUN (test code = BUN) 18           7-22                      



[place] 017232-03-27 10:12:00





             Test Item    Value        Reference Range Interpretation Comments

 

             Creatinine Lvl (test code = Creatinine 0.78         0.50-1.40      

           



             Lvl)                                                



[place] 017232-03-27 10:12:00





             Test Item    Value        Reference Range Interpretation Comments

 

             Sodium Lvl (test code = Sodium Lvl) 138          135-145           

        



[place] 017232-03-27 10:12:00





             Test Item    Value        Reference Range Interpretation Comments

 

             Potassium Lvl (test code = Potassium 4.6          3.5-5.1          

         



             Lvl)                                                



[place] 017232-03-27 10:12:00





             Test Item    Value        Reference Range Interpretation Comments

 

             Chloride Lvl (test code = Chloride Lvl) 111                  

            



[place] 017232-03-27 10:12:00





             Test Item    Value        Reference Range Interpretation Comments

 

             CO2 (test code = CO2) 21           24-32                     



[place] 017232-03-27 10:12:00





             Test Item    Value        Reference Range Interpretation Comments

 

             AGAP (test code = AGAP) 10.6         10.0-20.0                 



[place] 017232-03-27 10:12:00





             Test Item    Value        Reference Range Interpretation Comments

 

             Calcium Lvl (test code = Calcium Lvl) 8.6          8.5-10.5        

          



[place] 017232-03-27 10:12:00





             Test Item    Value        Reference Range Interpretation Comments

 

             eGFR (test code = eGFR) 116                                    



[place] 077282-03-27 10:12:00





             Test Item    Value        Reference Range Interpretation Comments

 

             WBC (test code = WBC) 8.2          3.7-10.4                  



[place] 077282-03-27 10:12:00





             Test Item    Value        Reference Range Interpretation Comments

 

             RBC (test code = RBC) 5.14         4.70-6.10                 



[place] [address]-03-27 10:12:00





             Test Item    Value        Reference Range Interpretation Comments

 

             Hgb (test code = Hgb) 15.5         14.0-18.0                 



[place] [address]-03-27 10:12:00





             Test Item    Value        Reference Range Interpretation Comments

 

             Hct (test code = Hct) 46.0         42.0-54.0                 



[place] [address]-03-27 10:12:00





             Test Item    Value        Reference Range Interpretation Comments

 

             MCV (test code = MCV) 89.5         80.0-94.0                 



[place] [address]-03-27 10:12:00





             Test Item    Value        Reference Range Interpretation Comments

 

             MCH (test code = MCH) 30.2 pg      27.0-31.0                 



[place] [address]-03-27 10:12:00





             Test Item    Value        Reference Range Interpretation Comments

 

             MCHC (test code = MCHC) 33.8         32.0-36.0                 



[place] [address]-03-27 10:12:00





             Test Item    Value        Reference Range Interpretation Comments

 

             RDW (test code = RDW) 15.3         11.5-14.5                 



[place] [address]-03-27 10:12:00





             Test Item    Value        Reference Range Interpretation Comments

 

             Platelet (test code = Platelet) 358          133-450               

    



[place] 077282-03-27 10:12:00





             Test Item    Value        Reference Range Interpretation Comments

 

             MPV (test code = MPV) 8.3          7.4-10.4                  



[place] 017232-03-27 10:12:00





             Test Item    Value        Reference Range Interpretation Comments

 

             Glucose Lvl (test code = Glucose Lvl) 115          70-99           

          



[place] 017232-03-27 10:12:00





             Test Item    Value        Reference Range Interpretation Comments

 

             BUN (test code = BUN) 18           7-22                      



[place] 017232-03-27 10:12:00





             Test Item    Value        Reference Range Interpretation Comments

 

             Creatinine Lvl (test code = Creatinine 0.78         0.50-1.40      

           



             Lvl)                                                



[place] 017232-03-27 10:12:00





             Test Item    Value        Reference Range Interpretation Comments

 

             Sodium Lvl (test code = Sodium Lvl) 138          135-145           

        



[place] 017232-03-27 10:12:00





             Test Item    Value        Reference Range Interpretation Comments

 

             Potassium Lvl (test code = Potassium 4.6          3.5-5.1          

         



             Lvl)                                                



[place] 017232-03-27 10:12:00





             Test Item    Value        Reference Range Interpretation Comments

 

             Chloride Lvl (test code = Chloride Lvl) 111                  

            



[place] 017232-03-27 10:12:00





             Test Item    Value        Reference Range Interpretation Comments

 

             CO2 (test code = CO2) 21           24-32                     



[place] 017232-03-27 10:12:00





             Test Item    Value        Reference Range Interpretation Comments

 

             AGAP (test code = AGAP) 10.6         10.0-20.0                 



[place] [address]-03-27 10:12:00





             Test Item    Value        Reference Range Interpretation Comments

 

             Calcium Lvl (test code = Calcium Lvl) 8.6          8.5-10.5        

          



[place] 017232-03-27 10:12:00





             Test Item    Value        Reference Range Interpretation Comments

 

             eGFR (test code = eGFR) 116                                    



[place] 077282-03-27 10:12:00





             Test Item    Value        Reference Range Interpretation Comments

 

             WBC (test code = WBC) 8.2          3.7-10.4                  



[place] 077282-03-27 10:12:00





             Test Item    Value        Reference Range Interpretation Comments

 

             RBC (test code = RBC) 5.14         4.70-6.10                 



[place] 077282-03-27 10:12:00





             Test Item    Value        Reference Range Interpretation Comments

 

             Hgb (test code = Hgb) 15.5         14.0-18.0                 



[place] [address]-03-27 10:12:00





             Test Item    Value        Reference Range Interpretation Comments

 

             Hct (test code = Hct) 46.0         42.0-54.0                 



[place] [address]-03-27 10:12:00





             Test Item    Value        Reference Range Interpretation Comments

 

             MCV (test code = MCV) 89.5         80.0-94.0                 



[place] [address]-03-27 10:12:00





             Test Item    Value        Reference Range Interpretation Comments

 

             MCH (test code = MCH) 30.2 pg      27.0-31.0                 



[place] [address]-03-27 10:12:00





             Test Item    Value        Reference Range Interpretation Comments

 

             MCHC (test code = MCHC) 33.8         32.0-36.0                 



[place] [address]-03-27 10:12:00





             Test Item    Value        Reference Range Interpretation Comments

 

             RDW (test code = RDW) 15.3         11.5-14.5                 



[place] 077282-03-27 10:12:00





             Test Item    Value        Reference Range Interpretation Comments

 

             Platelet (test code = Platelet) 358          133-450               

    



[facility]





             Test Item    Value        Reference Range Interpretation Comments

 

             MPV (test code = MPV) 8.3          7.4-10.4                  



[facility]2022-03-27 10:12:00





             Test Item    Value        Reference Range Interpretation Comments

 

             Glucose Lvl (test code = Glucose Lvl) 115          70-99           

          



[facility]2022-03-27 10:12:00





             Test Item    Value        Reference Range Interpretation Comments

 

             BUN (test code = BUN) 18           7-22                      



[place] 017232-03-27 10:12:00





             Test Item    Value        Reference Range Interpretation Comments

 

             Creatinine Lvl (test code = Creatinine 0.78         0.50-1.40      

           



             Lvl)                                                



[facility]2022-03-27 10:12:00





             Test Item    Value        Reference Range Interpretation Comments

 

             Sodium Lvl (test code = Sodium Lvl) 138          135-145           

        



[place] 017232-03-27 10:12:00





             Test Item    Value        Reference Range Interpretation Comments

 

             Potassium Lvl (test code = Potassium 4.6          3.5-5.1          

         



             Lvl)                                                



[place] 017232-03-27 10:12:00





             Test Item    Value        Reference Range Interpretation Comments

 

             Chloride Lvl (test code = Chloride Lvl) 111                  

            



[place] 017232-03-27 10:12:00





             Test Item    Value        Reference Range Interpretation Comments

 

             CO2 (test code = CO2) 21           24-32                     



[place] 017232-03-27 10:12:00





             Test Item    Value        Reference Range Interpretation Comments

 

             AGAP (test code = AGAP) 10.6         10.0-20.0                 



[place] [address]-03-27 10:12:00





             Test Item    Value        Reference Range Interpretation Comments

 

             Calcium Lvl (test code = Calcium Lvl) 8.6          8.5-10.5        

          



[place] 017232-03-27 10:12:00





             Test Item    Value        Reference Range Interpretation Comments

 

             eGFR (test code = eGFR) 116                                    



[place] [address]-03-27 10:12:00





             Test Item    Value        Reference Range Interpretation Comments

 

             WBC (test code = WBC) 8.2          3.7-10.4                  



[place] [address]-03-27 10:12:00





             Test Item    Value        Reference Range Interpretation Comments

 

             RBC (test code = RBC) 5.14         4.70-6.10                 



[place] [address]-03-27 10:12:00





             Test Item    Value        Reference Range Interpretation Comments

 

             Hgb (test code = Hgb) 15.5         14.0-18.0                 



[address]03-27 10:12:00





             Test Item    Value        Reference Range Interpretation Comments

 

             Hct (test code = Hct) 46.0         42.0-54.0                 



[place] [address]-03-27 10:12:00





             Test Item    Value        Reference Range Interpretation Comments

 

             MCV (test code = MCV) 89.5         80.0-94.0                 



[place] [address]-03-27 10:12:00





             Test Item    Value        Reference Range Interpretation Comments

 

             MCH (test code = MCH) 30.2 pg      27.0-31.0                 



[place] 077282-03-27 10:12:00





             Test Item    Value        Reference Range Interpretation Comments

 

             MCHC (test code = MCHC) 33.8         32.0-36.0                 



[place] 077282-03-27 10:12:00





             Test Item    Value        Reference Range Interpretation Comments

 

             RDW (test code = RDW) 15.3         11.5-14.5                 



[place] 077282-03-27 10:12:00





             Test Item    Value        Reference Range Interpretation Comments

 

             Platelet (test code = Platelet) 358          133-450               

    



[place] 077282-03-27 10:12:00





             Test Item    Value        Reference Range Interpretation Comments

 

             MPV (test code = MPV) 8.3          7.4-10.4                  



[place] 017232-03-27 10:12:00





             Test Item    Value        Reference Range Interpretation Comments

 

             Glucose Lvl (test code = Glucose Lvl) 115          70-99           

          



[place] 017232-03-27 10:12:00





             Test Item    Value        Reference Range Interpretation Comments

 

             BUN (test code = BUN) 18           -22                      



[place] 017232-03-27 10:12:00





             Test Item    Value        Reference Range Interpretation Comments

 

             Creatinine Lvl (test code = Creatinine 0.78         0.50-1.40      

           



             Lvl)                                                



[facility]2022-03-27 10:12:00





             Test Item    Value        Reference Range Interpretation Comments

 

             Sodium Lvl (test code = Sodium Lvl) 138          135-145           

        



[place] 017232-03-27 10:12:00





             Test Item    Value        Reference Range Interpretation Comments

 

             Potassium Lvl (test code = Potassium 4.6          3.5-5.1          

         



             Lvl)                                                



[place] 017232-03-27 10:12:00





             Test Item    Value        Reference Range Interpretation Comments

 

             Chloride Lvl (test code = Chloride Lvl) 111                  

            



[place] 017232-03-27 10:12:00





             Test Item    Value        Reference Range Interpretation Comments

 

             CO2 (test code = CO2) 21           24-32                     



[place] 017232-03-27 10:12:00





             Test Item    Value        Reference Range Interpretation Comments

 

             AGAP (test code = AGAP) 10.6         10.0-20.0                 



[place] 017232-03-27 10:12:00





             Test Item    Value        Reference Range Interpretation Comments

 

             Calcium Lvl (test code = Calcium Lvl) 8.6          8.5-10.5        

          



[facility]2022-03-27 10:12:00





             Test Item    Value        Reference Range Interpretation Comments

 

             eGFR (test code = eGFR) 116                                    



[facility]yn[facility]stSSJOAUJSJU8421[facility] 10:12:00





             Test Item    Value        Reference Range Interpretation Comments

 

             WBC (test code = WBC) 8.2          3.7-10.4                  



[facility] DallasQacoiymOWOOODUSXW4997[facility]04[facility] 10:12:00





             Test Item    Value        Reference Range Interpretation Comments

 

             RBC (test code = RBC) 5.14         4.70-6.10                 



[facility] DallasHkonhlhCLMGRNCSQC3437-76-66 10:12:00





             Test Item    Value        Reference Range Interpretation Comments

 

             Hgb (test code = Hgb) 15.5         14.0-18.0                 



[facility] DallasSafcaadSKNNUAHZSL4396-75-77 10:12:00





             Test Item    Value        Reference Range Interpretation Comments

 

             Hct (test code = Hct) 46.0         42.0-54.0                 



[place] 077282-03-27 10:12:00





             Test Item    Value        Reference Range Interpretation Comments

 

             MCV (test code = MCV) 89.5         80.0-94.0                 



[facility] DallasWtnphehDOQAKFUJGN3860-26-10 10:12:00





             Test Item    Value        Reference Range Interpretation Comments

 

             MCH (test code = MCH) 30.2 pg      27.0-31.0                 



[facility] DallasZrlhmejFHOBILKBXL1421-39-36 10:12:00





             Test Item    Value        Reference Range Interpretation Comments

 

             MCHC (test code = MCHC) 33.8         32.0-36.0                 



[place] 077282-03-27 10:12:00





             Test Item    Value        Reference Range Interpretation Comments

 

             RDW (test code = RDW) 15.3         11.5-14.5                 



[place] 077282-03-27 10:12:00





             Test Item    Value        Reference Range Interpretation Comments

 

             Platelet (test code = Platelet) 358          133-450               

    



[facility] DallasDwfrsiuKDFGSVGDEI7893-24-54 10:12:00





             Test Item    Value        Reference Range Interpretation Comments

 

             MPV (test code = MPV) 8.3          7.4-10.4                  



[facility]2022-03-27 10:12:00





             Test Item    Value        Reference Range Interpretation Comments

 

             Glucose Lvl (test code = Glucose Lvl) 115          70-99           

          



[place] 017232-03-27 10:12:00





             Test Item    Value        Reference Range Interpretation Comments

 

             BUN (test code = BUN) 18           7-22                      



[place] 017232-03-27 10:12:00





             Test Item    Value        Reference Range Interpretation Comments

 

             Creatinine Lvl (test code = Creatinine 0.78         0.50-1.40      

           



             Lvl)                                                



[place] 017232-03-27 10:12:00





             Test Item    Value        Reference Range Interpretation Comments

 

             Sodium Lvl (test code = Sodium Lvl) 138          135-145           

        



[place] 017232-03-27 10:12:00





             Test Item    Value        Reference Range Interpretation Comments

 

             Potassium Lvl (test code = Potassium 4.6          3.5-5.1          

         



             Lvl)                                                



[place] 017232-03-27 10:12:00





             Test Item    Value        Reference Range Interpretation Comments

 

             Chloride Lvl (test code = Chloride Lvl) 111                  

            



[place] 017232-03-27 10:12:00





             Test Item    Value        Reference Range Interpretation Comments

 

             CO2 (test code = CO2) 21           24-32                     



[place] 017232-03-27 10:12:00





             Test Item    Value        Reference Range Interpretation Comments

 

             AGAP (test code = AGAP) 10.6         10.0-20.0                 



[place] 017232-03-27 10:12:00





             Test Item    Value        Reference Range Interpretation Comments

 

             Calcium Lvl (test code = Calcium Lvl) 8.6          8.5-10.5        

          



[place] 017232-03-27 10:12:00





             Test Item    Value        Reference Range Interpretation Comments

 

             eGFR (test code = eGFR) 116                                    



[place] 077282-03-27 10:12:00





             Test Item    Value        Reference Range Interpretation Comments

 

             WBC (test code = WBC) 8.2          3.7-10.4                  



[place] [address]-03-27 10:12:00





             Test Item    Value        Reference Range Interpretation Comments

 

             RBC (test code = RBC) 5.14         4.70-6.10                 



[place] [address]-03-27 10:12:00





             Test Item    Value        Reference Range Interpretation Comments

 

             Hgb (test code = Hgb) 15.5         14.0-18.0                 



[place] [address]-03-27 10:12:00





             Test Item    Value        Reference Range Interpretation Comments

 

             Hct (test code = Hct) 46.0         42.0-54.0                 



[place] [address]-03-27 10:12:00





             Test Item    Value        Reference Range Interpretation Comments

 

             MCV (test code = MCV) 89.5         80.0-94.0                 



[place] [address]-03-27 10:12:00





             Test Item    Value        Reference Range Interpretation Comments

 

             MCH (test code = MCH) 30.2 pg      27.0-31.0                 



[place] 077282-03-27 10:12:00





             Test Item    Value        Reference Range Interpretation Comments

 

             MCHC (test code = MCHC) 33.8         32.0-36.0                 



[place] [address]-03-27 10:12:00





             Test Item    Value        Reference Range Interpretation Comments

 

             RDW (test code = RDW) 15.3         11.5-14.5                 



[place] [address]-03-27 10:12:00





             Test Item    Value        Reference Range Interpretation Comments

 

             Platelet (test code = Platelet) 358          133-450               

    



[place] 077282-03-27 10:12:00





             Test Item    Value        Reference Range Interpretation Comments

 

             MPV (test code = MPV) 8.3          7.4-10.4                  



[place] 017232-03-27 10:12:00





             Test Item    Value        Reference Range Interpretation Comments

 

             Glucose Lvl (test code = Glucose Lvl) 115          70-99           

          



[place] 017232-03-27 10:12:00





             Test Item    Value        Reference Range Interpretation Comments

 

             BUN (test code = BUN) 18           7-22                      



[place] 017232-03-27 10:12:00





             Test Item    Value        Reference Range Interpretation Comments

 

             Creatinine Lvl (test code = Creatinine 0.78         0.50-1.40      

           



             Lvl)                                                



[facility]2022-03-27 10:12:00





             Test Item    Value        Reference Range Interpretation Comments

 

             Sodium Lvl (test code = Sodium Lvl) 138          135-145           

        



[place] 017232-03-27 10:12:00





             Test Item    Value        Reference Range Interpretation Comments

 

             Potassium Lvl (test code = Potassium 4.6          3.5-5.1          

         



             Lvl)                                                



[place] 017232-03-27 10:12:00





             Test Item    Value        Reference Range Interpretation Comments

 

             Chloride Lvl (test code = Chloride Lvl) 111                  

            



[place] 017232-03-27 10:12:00





             Test Item    Value        Reference Range Interpretation Comments

 

             CO2 (test code = CO2) 21           24-32                     



[facility]2022-03-27 10:12:00





             Test Item    Value        Reference Range Interpretation Comments

 

             AGAP (test code = AGAP) 10.6         10.0-20.0                 



[facility]2022-03-27 10:12:00





             Test Item    Value        Reference Range Interpretation Comments

 

             Calcium Lvl (test code = Calcium Lvl) 8.6          8.5-10.5        

          



[facility]2022-03-27 10:12:00





             Test Item    Value        Reference Range Interpretation Comments

 

             eGFR (test code = eGFR) 116                                    



[facility]12[facility]00





             Test Item    Value        Reference Range Interpretation Comments

 

             WBC (test code = WBC) 8.2          3.7-10.4                  



[facility]41[facility]16[facility] 10:12:00





             Test Item    Value        Reference Range Interpretation Comments

 

             RBC (test code = RBC) 5.14         4.70-6.10                 



[facility]yobplqKHDWYBOZVO8531-65-77 10:12:00





             Test Item    Value        Reference Range Interpretation Comments

 

             Hgb (test code = Hgb) 15.5         14.0-18.0                 



[facility]irbcooUBZUGMQDOU7963-97-39 10:12:00





             Test Item    Value        Reference Range Interpretation Comments

 

             Hct (test code = Hct) 46.0         42.0-54.0                 



[facility]aehhpoRKKTMCYHKU1894-86-85 10:12:00





             Test Item    Value        Reference Range Interpretation Comments

 

             MCV (test code = MCV) 89.5         80.0-94.0                 



[place] 077282-03-27 10:12:00





             Test Item    Value        Reference Range Interpretation Comments

 

             MCH (test code = MCH) 30.2 pg      27.0-31.0                 



[place] 077282-03-27 10:12:00





             Test Item    Value        Reference Range Interpretation Comments

 

             MCHC (test code = MCHC) 33.8         32.0-36.0                 



[place] 077282-03-27 10:12:00





             Test Item    Value        Reference Range Interpretation Comments

 

             RDW (test code = RDW) 15.3         11.5-14.5                 



[place] 077282-03-27 10:12:00





             Test Item    Value        Reference Range Interpretation Comments

 

             Platelet (test code = Platelet) 358          133-450               

    



[place] 077282-03-27 10:12:00





             Test Item    Value        Reference Range Interpretation Comments

 

             MPV (test code = MPV) 8.3          7.4-10.4                  



[facility]2022-03-27 06:53:00





             Test Item    Value        Reference Range Interpretation Comments

 

             Glucose Lvl (test code = Glucose Lvl) 167          70-99           

          



[place] 017232-03-27 06:53:00





             Test Item    Value        Reference Range Interpretation Comments

 

             BUN (test code = BUN) 16                                 



[place] 017232-03-27 06:53:00





             Test Item    Value        Reference Range Interpretation Comments

 

             Creatinine Lvl (test code = Creatinine 0.99         0.50-1.40      

           



             Lvl)                                                



[place] 017232-03-27 06:53:00





             Test Item    Value        Reference Range Interpretation Comments

 

             Sodium Lvl (test code = Sodium Lvl) 141          135-145           

        



[place] 017232-03-27 06:53:00





             Test Item    Value        Reference Range Interpretation Comments

 

             Potassium Lvl (test code = Potassium 4.5          3.5-5.1          

         



             Lvl)                                                



[facility]2022-03-27 06:53:00





             Test Item    Value        Reference Range Interpretation Comments

 

             Chloride Lvl (test code = Chloride Lvl) 111                  

            



[facility]2022-03-27 06:53:00





             Test Item    Value        Reference Range Interpretation Comments

 

             CO2 (test code = CO2) 22           24-32                     



[place] 017232-03-27 06:53:00





             Test Item    Value        Reference Range Interpretation Comments

 

             AGAP (test code = AGAP) 12.5         10.0-20.0                 



[place] 017232-03-27 06:53:00





             Test Item    Value        Reference Range Interpretation Comments

 

             Calcium Lvl (test code = Calcium Lvl) 8.6          8.5-10.5        

          



[place] 017232-03-27 06:53:00





             Test Item    Value        Reference Range Interpretation Comments

 

             eGFR (test code = eGFR) 98                                     



[place] 017232-03-27 06:53:00





             Test Item    Value        Reference Range Interpretation Comments

 

             Glucose Lvl (test code = Glucose Lvl) 167          70-99           

          



[facility]2022-03-27 06:53:00





             Test Item    Value        Reference Range Interpretation Comments

 

             BUN (test code = BUN) 16           7-                      



[place] 017232-03-27 06:53:00





             Test Item    Value        Reference Range Interpretation Comments

 

             Creatinine Lvl (test code = Creatinine 0.99         0.50-1.40      

           



             Lvl)                                                



[place] 017232-03-27 06:53:00





             Test Item    Value        Reference Range Interpretation Comments

 

             Sodium Lvl (test code = Sodium Lvl) 141          135-145           

        



[place] 017232-03-27 06:53:00





             Test Item    Value        Reference Range Interpretation Comments

 

             Potassium Lvl (test code = Potassium 4.5          3.5-5.1          

         



             Lvl)                                                



[place] 017232-03-27 06:53:00





             Test Item    Value        Reference Range Interpretation Comments

 

             Chloride Lvl (test code = Chloride Lvl) 111                  

            



[place] 017232-03-27 06:53:00





             Test Item    Value        Reference Range Interpretation Comments

 

             CO2 (test code = CO2) 22           24-                     



[place] 017232-03-27 06:53:00





             Test Item    Value        Reference Range Interpretation Comments

 

             AGAP (test code = AGAP) 12.5         10.0-20.0                 



[place] 017232-03-27 06:53:00





             Test Item    Value        Reference Range Interpretation Comments

 

             Calcium Lvl (test code = Calcium Lvl) 8.6          8.5-10.5        

          



[place] 017232-03-27 06:53:00





             Test Item    Value        Reference Range Interpretation Comments

 

             eGFR (test code = eGFR) 98                                     



[place] 017232-03-27 06:53:00





             Test Item    Value        Reference Range Interpretation Comments

 

             Glucose Lvl (test code = Glucose Lvl) 167          70-99           

          



[place] 017232-03-27 06:53:00





             Test Item    Value        Reference Range Interpretation Comments

 

             BUN (test code = BUN) 16           -22                      



[place] 017232-03-27 06:53:00





             Test Item    Value        Reference Range Interpretation Comments

 

             Creatinine Lvl (test code = Creatinine 0.99         0.50-1.40      

           



             Lvl)                                                



[place] 017232-03-27 06:53:00





             Test Item    Value        Reference Range Interpretation Comments

 

             Sodium Lvl (test code = Sodium Lvl) 141          135-145           

        



[place] 017232-03-27 06:53:00





             Test Item    Value        Reference Range Interpretation Comments

 

             Potassium Lvl (test code = Potassium 4.5          3.5-5.1          

         



             Lvl)                                                



[place] 017232-03-27 06:53:00





             Test Item    Value        Reference Range Interpretation Comments

 

             Chloride Lvl (test code = Chloride Lvl) 111                  

            



[place] 017232-03-27 06:53:00





             Test Item    Value        Reference Range Interpretation Comments

 

             CO2 (test code = CO2) 22           -                     



[place] 017232-03-27 06:53:00





             Test Item    Value        Reference Range Interpretation Comments

 

             AGAP (test code = AGAP) 12.5         10.0-20.0                 



[place] 017232-03-27 06:53:00





             Test Item    Value        Reference Range Interpretation Comments

 

             Calcium Lvl (test code = Calcium Lvl) 8.6          8.5-10.5        

          



[place] 017232-03-27 06:53:00





             Test Item    Value        Reference Range Interpretation Comments

 

             eGFR (test code = eGFR) 98                                     



[place] 017232-03-27 06:53:00





             Test Item    Value        Reference Range Interpretation Comments

 

             Glucose Lvl (test code = Glucose Lvl) 167          70-99           

          



[place] 017232-03-27 06:53:00





             Test Item    Value        Reference Range Interpretation Comments

 

             BUN (test code = BUN) 16           7-22                      



[place] 017232-03-27 06:53:00





             Test Item    Value        Reference Range Interpretation Comments

 

             Creatinine Lvl (test code = Creatinine 0.99         0.50-1.40      

           



             Lvl)                                                



[place] 017232-03-27 06:53:00





             Test Item    Value        Reference Range Interpretation Comments

 

             Sodium Lvl (test code = Sodium Lvl) 141          135-145           

        



[place] 017232-03-27 06:53:00





             Test Item    Value        Reference Range Interpretation Comments

 

             Potassium Lvl (test code = Potassium 4.5          3.5-5.1          

         



             Lvl)                                                



[place] 017232-03-27 06:53:00





             Test Item    Value        Reference Range Interpretation Comments

 

             Chloride Lvl (test code = Chloride Lvl) 111                  

            



[place] 017232-03-27 06:53:00





             Test Item    Value        Reference Range Interpretation Comments

 

             CO2 (test code = CO2) 22           -32                     



[place] 017232-03-27 06:53:00





             Test Item    Value        Reference Range Interpretation Comments

 

             AGAP (test code = AGAP) 12.5         10.0-20.0                 



[place] 017232-03-27 06:53:00





             Test Item    Value        Reference Range Interpretation Comments

 

             Calcium Lvl (test code = Calcium Lvl) 8.6          8.5-10.5        

          



[place] 017232-03-27 06:53:00





             Test Item    Value        Reference Range Interpretation Comments

 

             eGFR (test code = eGFR) 98                                     



[place] 017232-03-27 06:53:00





             Test Item    Value        Reference Range Interpretation Comments

 

             Glucose Lvl (test code = Glucose Lvl) 167          70-99           

          



[place] 017232-03-27 06:53:00





             Test Item    Value        Reference Range Interpretation Comments

 

             BUN (test code = BUN) 16           7-22                      



[place] 017232-03-27 06:53:00





             Test Item    Value        Reference Range Interpretation Comments

 

             Creatinine Lvl (test code = Creatinine 0.99         0.50-1.40      

           



             Lvl)                                                



[place] 017232-03-27 06:53:00





             Test Item    Value        Reference Range Interpretation Comments

 

             Sodium Lvl (test code = Sodium Lvl) 141          135-145           

        



[place] 017232-03-27 06:53:00





             Test Item    Value        Reference Range Interpretation Comments

 

             Potassium Lvl (test code = Potassium 4.5          3.5-5.1          

         



             Lvl)                                                



[place] 017232-03-27 06:53:00





             Test Item    Value        Reference Range Interpretation Comments

 

             Chloride Lvl (test code = Chloride Lvl) 111                  

            



[place] 017232-03-27 06:53:00





             Test Item    Value        Reference Range Interpretation Comments

 

             CO2 (test code = CO2) 22           24-32                     



[place] 017232-03-27 06:53:00





             Test Item    Value        Reference Range Interpretation Comments

 

             AGAP (test code = AGAP) 12.5         10.0-20.0                 



[place] 017232-03-27 06:53:00





             Test Item    Value        Reference Range Interpretation Comments

 

             Calcium Lvl (test code = Calcium Lvl) 8.6          8.5-10.5        

          



[place] 017232-03-27 06:53:00





             Test Item    Value        Reference Range Interpretation Comments

 

             eGFR (test code = eGFR) 98                                     



[place] 017232-03-27 06:53:00





             Test Item    Value        Reference Range Interpretation Comments

 

             Glucose Lvl (test code = Glucose Lvl) 167          70-99           

          



[place] 017232-03-27 06:53:00





             Test Item    Value        Reference Range Interpretation Comments

 

             BUN (test code = BUN) 16                                 



[place] 017232-03-27 06:53:00





             Test Item    Value        Reference Range Interpretation Comments

 

             Creatinine Lvl (test code = Creatinine 0.99         0.50-1.40      

           



             Lvl)                                                



[place] 017232-03-27 06:53:00





             Test Item    Value        Reference Range Interpretation Comments

 

             Sodium Lvl (test code = Sodium Lvl) 141          135-145           

        



[place] 017232-03-27 06:53:00





             Test Item    Value        Reference Range Interpretation Comments

 

             Potassium Lvl (test code = Potassium 4.5          3.5-5.1          

         



             Lvl)                                                



[place] 017232-03-27 06:53:00





             Test Item    Value        Reference Range Interpretation Comments

 

             Chloride Lvl (test code = Chloride Lvl) 111                  

            



[place] 017232-03-27 06:53:00





             Test Item    Value        Reference Range Interpretation Comments

 

             CO2 (test code = CO2) -                     



[place] 017232-03-27 06:53:00





             Test Item    Value        Reference Range Interpretation Comments

 

             AGAP (test code = AGAP) 12.5         10.0-20.0                 



[place] 017232-03-27 06:53:00





             Test Item    Value        Reference Range Interpretation Comments

 

             Calcium Lvl (test code = Calcium Lvl) 8.6          8.5-10.5        

          



[place] 017232-03-27 06:53:00





             Test Item    Value        Reference Range Interpretation Comments

 

             eGFR (test code = eGFR) 98                                     



[place] 017232-03-27 06:53:00





             Test Item    Value        Reference Range Interpretation Comments

 

             Glucose Lvl (test code = Glucose Lvl) 167          70-99           

          



[place] 017232-03-27 06:53:00





             Test Item    Value        Reference Range Interpretation Comments

 

             BUN (test code = BUN) 16                                 



[place] 017232-03-27 06:53:00





             Test Item    Value        Reference Range Interpretation Comments

 

             Creatinine Lvl (test code = Creatinine 0.99         0.50-1.40      

           



             Lvl)                                                



[place] 017232-03-27 06:53:00





             Test Item    Value        Reference Range Interpretation Comments

 

             Sodium Lvl (test code = Sodium Lvl) 141          135-145           

        



[facility] [id number] 06[facility]53:00





             Test Item    Value        Reference Range Interpretation Comments

 

             Potassium Lvl (test code = Potassium 4.5          3.5-5.1          

         



             Lvl)                                                



[facility] [id number] 06:53:00





             Test Item    Value        Reference Range Interpretation Comments

 

             Chloride Lvl (test code = Chloride Lvl) 111                  

            



[facility] [id number] 06:53:00





             Test Item    Value        Reference Range Interpretation Comments

 

             CO2 (test code = CO2) 22           24-32                     



[facility] [id number] 06:53:00





             Test Item    Value        Reference Range Interpretation Comments

 

             AGAP (test code = AGAP) 12.5         10.0-20.0                 



[facility] [id number] 06:53:00





             Test Item    Value        Reference Range Interpretation Comments

 

             Calcium Lvl (test code = Calcium Lvl) 8.6          8.5-10.5        

          



[facility] [id number] 06:53:00





             Test Item    Value        Reference Range Interpretation Comments

 

             eGFR (test code = eGFR) 98                                     



[facility]SUSC:PT:ISOLATE:ORDQN:[id number] 22:59:00





             Test Item    Value        Reference Range Interpretation Comments

 

             Culture: Urine (test >100,000 CFU/mL Proteus                       

    



             code = Culture: mirabilis >100,000 CFU/mL                          

 



             Urine)       Providencia stuartii                           



[facility]USC:PT:ISOLATE:ORDQN:[id number] 22:59:00





             Test Item    Value        Reference Range Interpretation Comments

 

             Providencia stuartii Providencia stuartii                          

 



             (test code = Providencia                                        



             stuartii)                                           



[facility]USC:PT:ISOLATE:ORDQN:[id number] 22:59:00





             Test Item    Value        Reference Range Interpretation Comments

 

             Proteus mirabilis (test Proteus mirabilis                          

 



             code = Proteus mirabilis)                                        



[facility] [id number] 22:59:00





             Test Item    Value        Reference Range Interpretation Comments

 

             Culture: Urine (test Holding For Better                           



             code = Culture: Urine) Growth                                 



[facility]USC:PT:ISOLATE:ORDQN:[id number] 22:59:00





             Test Item    Value        Reference Range Interpretation Comments

 

             Culture: Urine (test >100,000 CFU/mL Proteus                       

    



             code = Culture: mirabilis >100,000 CFU/mL                          

 



             Urine)       Providencia stuartii                           



[facility]PT:ISOLATE:ORDQN:[id number] 22:59:00





             Test Item    Value        Reference Range Interpretation Comments

 

             Providencia stuartii Providencia stuartii                          

 



             (test code = Providencia                                        



             stuartii)                                           



[facility]:PT:ISOLATE:ORDQN:[id number] 22:59:00





             Test Item    Value        Reference Range Interpretation Comments

 

             Proteus mirabilis (test Proteus mirabilis                          

 



             code = Proteus mirabilis)                                        



[facility] [id number] 22:59:00





             Test Item    Value        Reference Range Interpretation Comments

 

             Culture: Urine (test Holding For Better                           



             code = Culture: Urine) Growth                                 



[facility]:PT:ISOLATE:ORDQN:[id number] 22:59:00





             Test Item    Value        Reference Range Interpretation Comments

 

             Culture: Urine (test >100,000 CFU/mL Proteus                       

    



             code = Culture: mirabilis >100,000 CFU/mL                          

 



             Urine)       Providencia stuartii                           



[facility]:PT:ISOLATE:ORDQN:[id number] 22:59:00





             Test Item    Value        Reference Range Interpretation Comments

 

             Providencia stuartii Providencia stuartii                          

 



             (test code = Providencia                                        



             stuartii)                                           



[facility]:PT:ISOLATE:ORDQN:[id number] 22:59:00





             Test Item    Value        Reference Range Interpretation Comments

 

             Proteus mirabilis (test Proteus mirabilis                          

 



             code = Proteus mirabilis)                                        



[facility] [id number] 22:59:00





             Test Item    Value        Reference Range Interpretation Comments

 

             Culture: Urine (test Holding For Better                           



             code = Culture: Urine) Growth                                 



[facility]USC:PT:ISOLATE:ORDQN:[id number] 22:59:00





             Test Item    Value        Reference Range Interpretation Comments

 

             Culture: Urine (test >100,000 CFU/mL Proteus                       

    



             code = Culture: mirabilis >100,000 CFU/mL                          

 



             Urine)       Providencia stuartii                           



[facility]:PT:ISOLATE:ORDQN:[id number] 22:59:00





             Test Item    Value        Reference Range Interpretation Comments

 

             Providencia stuartii Providencia stuartii                          

 



             (test code = Providencia                                        



             stuartii)                                           



[facility]USC:PT:ISOLATE:ORDQN:[id number] 22:59:00





             Test Item    Value        Reference Range Interpretation Comments

 

             Proteus mirabilis (test Proteus mirabilis                          

 



             code = Proteus mirabilis)                                        



[facility] [id number] 22:59:00





             Test Item    Value        Reference Range Interpretation Comments

 

             Culture: Urine (test Holding For Better                           



             code = Culture: Urine) Growth                                 



[facility]USC:PT:ISOLATE:ORDQN:[id number] 22:59:00





             Test Item    Value        Reference Range Interpretation Comments

 

             Culture: Urine (test >100,000 CFU/mL Proteus                       

    



             code = Culture: mirabilis >100,000 CFU/mL                          

 



             Urine)       Providencia stuartii                           



[facility]:PT:ISOLATE:ORDQN:[id number] 22:59:00





             Test Item    Value        Reference Range Interpretation Comments

 

             Providencia stuartii Providencia stuartii                          

 



             (test code = Providencia                                        



             stuartii)                                           



[facility]C:PT:ISOLATE:ORDQN:[id number] 22:59:00





             Test Item    Value        Reference Range Interpretation Comments

 

             Proteus mirabilis (test Proteus mirabilis                          

 



             code = Proteus mirabilis)                                        



[facility]ann[facility]: [id number] 22:59:00





             Test Item    Value        Reference Range Interpretation Comments

 

             Culture: Urine (test Holding For Better                           



             code = Culture: Urine) Growth                                 



[facility]:PT:ISOLATE:ORDQN:[id number] 22:59:00





             Test Item    Value        Reference Range Interpretation Comments

 

             Culture: Urine (test >100,000 CFU/mL Proteus                       

    



             code = Culture: mirabilis >100,000 CFU/mL                          

 



             Urine)       Providencia stuartii                           



[facility]USC:PT:ISOLATE:ORDQN:[id number] 22:59:00





             Test Item    Value        Reference Range Interpretation Comments

 

             Providencia stuartii Providencia stuartii                          

 



             (test code = Providencia                                        



             stuartii)                                           



[facility]PT:ISOLATE:ORDQN:[id number] 22:59:00





             Test Item    Value        Reference Range Interpretation Comments

 

             Proteus mirabilis (test Proteus mirabilis                          

 



             code = Proteus mirabilis)                                        



[facility] [id number] 22:59:00





             Test Item    Value        Reference Range Interpretation Comments

 

             Culture: Urine (test Holding For Better                           



             code = Culture: Urine) Growth                                 



[facility]:PT:ISOLATE:ORDQN:[id number] 22:59:00





             Test Item    Value        Reference Range Interpretation Comments

 

             Culture: Urine (test >100,000 CFU/mL Proteus                       

    



             code = Culture: mirabilis >100,000 CFU/mL                          

 



             Urine)       Providencia stuartii                           



[facility]PT:ISOLATE:ORDQN:[id number] 22:59:00





             Test Item    Value        Reference Range Interpretation Comments

 

             Providencia stuartii Providencia stuartii                          

 



             (test code = Providencia                                        



             stuartii)                                           



[facility]PT:ISOLATE:ORDQN:[id number] 22:59:00





             Test Item    Value        Reference Range Interpretation Comments

 

             Proteus mirabilis (test Proteus mirabilis                          

 



             code = Proteus mirabilis)                                        



[facility] [id number] 22:59:00





             Test Item    Value        Reference Range Interpretation Comments

 

             Culture: Urine (test Holding For Better                           



             code = Culture: Urine) Growth                                 



[facility] [id number] 21:03:00





             Test Item    Value        Reference Range Interpretation Comments

 

             Glucose Lvl (test code = Glucose Lvl) 126          70-99           

          



[facility] [place] [id number] 21:03:00





             Test Item    Value        Reference Range Interpretation Comments

 

             BUN (test code = BUN) 16           7-22                      



[place] 017232-03-26 21:03:00





             Test Item    Value        Reference Range Interpretation Comments

 

             Creatinine Lvl (test code = Creatinine 0.76         0.50-1.40      

           



             Lvl)                                                



[place] 017232-03-26 21:03:00





             Test Item    Value        Reference Range Interpretation Comments

 

             Sodium Lvl (test code = Sodium Lvl) 140          135-145           

        



[place] 017232-03-26 21:03:00





             Test Item    Value        Reference Range Interpretation Comments

 

             Potassium Lvl (test code = Potassium 3.3          3.5-5.1          

         



             Lvl)                                                



[place] 017232-03-26 21:03:00





             Test Item    Value        Reference Range Interpretation Comments

 

             Chloride Lvl (test code = Chloride Lvl) 111                  

            



[place] 017232-03-26 21:03:00





             Test Item    Value        Reference Range Interpretation Comments

 

             CO2 (test code = CO2) 22           24-32                     



[place] 017232-03-26 21:03:00





             Test Item    Value        Reference Range Interpretation Comments

 

             Calcium Lvl (test code = Calcium Lvl) 8.5          8.5-10.5        

          



[place] 017232-03-26 21:03:00





             Test Item    Value        Reference Range Interpretation Comments

 

             AGAP (test code = AGAP) 10.3         10.0-20.0                 



[place] 017232-03-26 21:03:00





             Test Item    Value        Reference Range Interpretation Comments

 

             eGFR (test code = eGFR) 117                                    



[place] 017232-03-26 21:03:00





             Test Item    Value        Reference Range Interpretation Comments

 

             Lactic Acid Lvl (test code = Lactic 1.1          0.5-2.2           

        



             Acid Lvl)                                           



[place] 077282-03-26 21:03:00





             Test Item    Value        Reference Range Interpretation Comments

 

             Segs (test code = Segs) 68.9         45.0-75.0                 



[place] 077282-03-26 21:03:00





             Test Item    Value        Reference Range Interpretation Comments

 

             Lymphocytes (test code = Lymphocytes) 20.4         20.0-40.0       

          



[place] 077282-03-26 21:03:00





             Test Item    Value        Reference Range Interpretation Comments

 

             Monocytes (test code = Monocytes) 8.2          2.0-12.0            

      



[place] 077282-03-26 21:03:00





             Test Item    Value        Reference Range Interpretation Comments

 

             Eosinophils (test code = 2.2          See_Comment                [A

utomated message] The



             Eosinophils)                                        system which ge

nerated



                                                                 this result tra

nsmitted



                                                                 reference range

: <=4.0.



                                                                 The reference r

zhen was



                                                                 not used to int

erpret



                                                                 this result as



                                                                 normal/abnormal

.



[place] 077282-03-26 21:03:00





             Test Item    Value        Reference Range Interpretation Comments

 

             Basophils (test code = 0.3          See_Comment                [Aut

omated message] The



             Basophils)                                          system which ge

nerated



                                                                 this result tra

nsmitted



                                                                 reference range

: <=1.0.



                                                                 The reference r

zhen was



                                                                 not used to int

erpret



                                                                 this result as



                                                                 normal/abnormal

.



[place] 077282-03-26 21:03:00





             Test Item    Value        Reference Range Interpretation Comments

 

             Neutrophils # (test code = Neutrophils 5.5          1.5-8.1        

           



             #)                                                  



[place] 077282-03-26 21:03:00





             Test Item    Value        Reference Range Interpretation Comments

 

             Lymphocytes # (test code = Lymphocytes 1.6          1.0-5.5        

           



             #)                                                  



[place] 077282-03-26 21:03:00





             Test Item    Value        Reference Range Interpretation Comments

 

             Monocytes # (test code 0.7          See_Comment                [Aut

omated message] The



             = Monocytes #)                                        system which 

generated



                                                                 this result tra

nsmitted



                                                                 reference range

: <=0.8.



                                                                 The reference r

zhen was



                                                                 not used to int

erpret



                                                                 this result as



                                                                 normal/abnormal

.



[place] 077282-03-26 21:03:00





             Test Item    Value        Reference Range Interpretation Comments

 

             Eosinophils # (test code 0.2          See_Comment                [A

utomated message] The



             = Eosinophils #)                                        system whic

h generated



                                                                 this result tra

nsmitted



                                                                 reference range

: <=0.5.



                                                                 The reference r

zhen was



                                                                 not used to int

erpret



                                                                 this result as



                                                                 normal/abnormal

.



[place] 077282-03-26 21:03:00





             Test Item    Value        Reference Range Interpretation Comments

 

             WBC (test code = WBC) 8.0          3.7-10.4                  



[place] 077282-03-26 21:03:00





             Test Item    Value        Reference Range Interpretation Comments

 

             RBC (test code = RBC) 5.37         4.70-6.10                 



[id number]





             Test Item    Value        Reference Range Interpretation Comments

 

             Hgb (test code = Hgb) 15.9         14.0-18.0                 



[facility] [id number] 21:03:00





             Test Item    Value        Reference Range Interpretation Comments

 

             Hct (test code = Hct) 47.3         42.0-54.0                 



[facility] DallasRlyklcjVIQUBRELYU1471-51-10 21:03:00





             Test Item    Value        Reference Range Interpretation Comments

 

             MCV (test code = MCV) 88.1         80.0-94.0                 



[facility] D[id number] 21:03:00





             Test Item    Value        Reference Range Interpretation Comments

 

             MCH (test code = MCH) 29.6 pg      27.0-31.0                 



[facility] DallasUiiwtaiPOKUUIYNVY6108-28-28 21:03:00





             Test Item    Value        Reference Range Interpretation Comments

 

             MCHC (test code = MCHC) 33.6         32.0-36.0                 



[facility] [id number] 21:03:00





             Test Item    Value        Reference Range Interpretation Comments

 

             RDW (test code = RDW) 15.3         11.5-14.5                 



[facility] D[id number] 21:03:00





             Test Item    Value        Reference Range Interpretation Comments

 

             Platelet (test code = Platelet) 370          133-450               

    



[facility] DallasVyrzbbkASWZVLTPKB1085-14-35 21:03:00





             Test Item    Value        Reference Range Interpretation Comments

 

             MPV (test code = MPV) 8.1          7.4-10.4                  



[facility]2022-03[facility]26 21:03:00





             Test Item    Value        Reference Range Interpretation Comments

 

             UA Comment 1 (test Suboptimal specimen                           



             code = UA Comment 1) received. Results may be                      

     



                          inaccurate due to the                           



                          age of the specimen.                           



                          Interpret results with                           



                          caution.                               



[facility] AND [id number] 21:03:00





             Test Item    Value        Reference Range Interpretation Comments

 

             UA Color (test code = Yellow *NA*(3/26/22                          

 



             UA Color)    4:03 PM)                               



[facility] AND [id number] 21:03:00





             Test Item    Value        Reference Range Interpretation Comments

 

             UA Turbidity (test code Slight *ABN*(3/26/22                       

    



             = UA Turbidity) 4:03 PM)                               



[facility] AND [id number] 21:03:00





             Test Item    Value        Reference Range Interpretation Comments

 

             UA Spec Grav (test code = UA Spec 1.015 1                          

      



             Grav)                                               



[facility] AND [id number] 21:03:00





             Test Item    Value        Reference Range Interpretation Comments

 

             UA pH (test code = UA pH) 5.0 1        5.0-8.0                   



[facility] AND [id number] 21:03:00





             Test Item    Value        Reference Range Interpretation Comments

 

             UA Protein (test code = UA Negative mg/dL                          

 



             Protein)                                            



[facility] AND [id number] 21:03:00





             Test Item    Value        Reference Range Interpretation Comments

 

             UA Glucose (test code = UA Negative mg/dL                          

 



             Glucose)                                            



[facility] AND [id number] 21:03:00





             Test Item    Value        Reference Range Interpretation Comments

 

             UA Ketones (test code = UA Negative mg/dL                          

 



             Ketones)                                            



[facility] AND [id number] 21:03:00





             Test Item    Value        Reference Range Interpretation Comments

 

             UA Bili (test code = Negative *NA*(3/26/22                         

  



             UA Bili)     4:03 PM)                               



[facility] AND [id number] 21:03:00





             Test Item    Value        Reference Range Interpretation Comments

 

             UA Blood (test code = Negative (3/26/22 4:03                       

    



             UA Blood)    PM)                                    



[facility] AND [id number] 21:03:00





             Test Item    Value        Reference Range Interpretation Comments

 

             UA Urobilinogen (test code = UA no gt        0.1-1.0               

    



             Urobilinogen)                                        



[facility] AND [id number] 21:03:00





             Test Item    Value        Reference Range Interpretation Comments

 

             UA Nitrite (test code Negative (3/26/22 4:03                       

    



             = UA Nitrite) PM)                                    



[facility] AND [id number] 21:03:00





             Test Item    Value        Reference Range Interpretation Comments

 

             UA Leuk Est (test code Large *ABN*(3/26/22                         

  



             = UA Leuk Est) 4:03 PM)                               



[facility] AND [id number] 21:03:00





             Test Item    Value        Reference Range Interpretation Comments

 

             UA WBC (test code = 64           See_Comment                [Automa

huma message] The



             UA WBC)                                             system which ge

nerated this



                                                                 result transmit

huma



                                                                 reference range

: <=5. The



                                                                 reference range

 was not



                                                                 used to interpr

et this



                                                                 result as zayda

l/abnormal.



[facility] AND [id number] 21:03:00





             Test Item    Value        Reference Range Interpretation Comments

 

             UA RBC (test code = 2            See_Comment                [Automa

huma message] The



             UA RBC)                                             system which ge

nerated this



                                                                 result transmit

huma



                                                                 reference range

: <=2. The



                                                                 reference range

 was not



                                                                 used to interpr

et this



                                                                 result as zayda

l/abnormal.



[facility] AND [id number] 21:03:00





             Test Item    Value        Reference Range Interpretation Comments

 

             UA Mucus (test code = UA Mucus) Few /LPF                           

    



[facility] AND [id number] 21:03:00





             Test Item    Value        Reference Range Interpretation Comments

 

             UA Sq Epi (test code = UA Sq Epi) None Seen                        

      



[facility]2022-03-26 21:03:00





             Test Item    Value        Reference Range Interpretation Comments

 

             Glucose Lvl (test code = Glucose Lvl) 126          70-99           

          



[facility]2022-03-26 21:03:00





             Test Item    Value        Reference Range Interpretation Comments

 

             BUN (test code = BUN) 16           7-22                      



[facility]2022-03-26 21:03:00





             Test Item    Value        Reference Range Interpretation Comments

 

             Creatinine Lvl (test code = Creatinine 0.76         0.50-1.40      

           



             Lvl)                                                



[facility]2022-03-26 21:03:00





             Test Item    Value        Reference Range Interpretation Comments

 

             Sodium Lvl (test code = Sodium Lvl) 140          135-145           

        



[facility]2022-03-26 21:03:00





             Test Item    Value        Reference Range Interpretation Comments

 

             Potassium Lvl (test code = Potassium 3.3          3.5-5.1          

         



             Lvl)                                                



[facility]2022-03-26 21:03:00





             Test Item    Value        Reference Range Interpretation Comments

 

             Chloride Lvl (test code = Chloride Lvl) 111                  

            



[facility]2022-03-26 21:03:00





             Test Item    Value        Reference Range Interpretation Comments

 

             CO2 (test code = CO2) 22           24-32                     



[facility]2022-03-26 21:03:00





             Test Item    Value        Reference Range Interpretation Comments

 

             Calcium Lvl (test code = Calcium Lvl) 8.5          8.5-10.5        

          



[facility]2022-03-26 21:03:00





             Test Item    Value        Reference Range Interpretation Comments

 

             AGAP (test code = AGAP) 10.3         10.0-20.0                 



[place] 017232-03-26 21:03:00





             Test Item    Value        Reference Range Interpretation Comments

 

             eGFR (test code = eGFR) 117                                    



[place] 017232-03-26 21:03:00





             Test Item    Value        Reference Range Interpretation Comments

 

             Lactic Acid Lvl (test code = Lactic 1.1          0.5-2.2           

        



             Acid Lvl)                                           



[place] 077282-03-26 21:03:00





             Test Item    Value        Reference Range Interpretation Comments

 

             Segs (test code = Segs) 68.9         45.0-75.0                 



[place] 077282-03-26 21:03:00





             Test Item    Value        Reference Range Interpretation Comments

 

             Lymphocytes (test code = Lymphocytes) 20.4         20.0-40.0       

          



[place] 077282-03-26 21:03:00





             Test Item    Value        Reference Range Interpretation Comments

 

             Monocytes (test code = Monocytes) 8.2          2.0-12.0            

      



[place] 077282-03-26 21:03:00





             Test Item    Value        Reference Range Interpretation Comments

 

             Eosinophils (test code = 2.2          See_Comment                [A

utomated message] The



             Eosinophils)                                        system which ge

nerated



                                                                 this result tra

nsmitted



                                                                 reference range

: <=4.0.



                                                                 The reference r

zhen was



                                                                 not used to int

erpret



                                                                 this result as



                                                                 normal/abnormal

.



[place] [address]302-03-26 21:03:00





             Test Item    Value        Reference Range Interpretation Comments

 

             Basophils (test code = 0.3          See_Comment                [Aut

omated message] The



             Basophils)                                          system which ge

nerated



                                                                 this result tra

nsmitted



                                                                 reference range

: <=1.0.



                                                                 The reference r

zhen was



                                                                 not used to int

erpret



                                                                 this result as



                                                                 normal/abnormal

.



[place] 077282-03-26 21:03:00





             Test Item    Value        Reference Range Interpretation Comments

 

             Neutrophils # (test code = Neutrophils 5.5          1.5-8.1        

           



             #)                                                  



[place] 077282-03-26 21:03:00





             Test Item    Value        Reference Range Interpretation Comments

 

             Lymphocytes # (test code = Lymphocytes 1.6          1.0-5.5        

           



             #)                                                  



[place] [address]-03-26 21:03:00





             Test Item    Value        Reference Range Interpretation Comments

 

             Monocytes # (test code 0.7          See_Comment                [Aut

omated message] The



             = Monocytes #)                                        system which 

generated



                                                                 this result tra

nsmitted



                                                                 reference range

: <=0.8.



                                                                 The reference r

zhen was



                                                                 not used to int

erpret



                                                                 this result as



                                                                 normal/abnormal

.



[id number]





             Test Item    Value        Reference Range Interpretation Comments

 

             Eosinophils # (test code 0.2          See_Comment                [A

utomated message] The



             = Eosinophils #)                                        system whic

h generated



                                                                 this result tra

nsmitted



                                                                 reference range

: <=0.5.



                                                                 The reference r

zhen was



                                                                 not used to int

erpret



                                                                 this result as



                                                                 normal/abnormal

.



[facility] [id number] 21:03:00





             Test Item    Value        Reference Range Interpretation Comments

 

             WBC (test code = WBC) 8.0          3.7-10.4                  



[place] 077282-03-26 21:03:00





             Test Item    Value        Reference Range Interpretation Comments

 

             RBC (test code = RBC) 5.37         4.70-6.10                 



[place] 077282-03-26 21:03:00





             Test Item    Value        Reference Range Interpretation Comments

 

             Hgb (test code = Hgb) 15.9         14.0-18.0                 



[place] 077282-03-26 21:03:00





             Test Item    Value        Reference Range Interpretation Comments

 

             Hct (test code = Hct) 47.3         42.0-54.0                 



[place] 077282-03-26 21:03:00





             Test Item    Value        Reference Range Interpretation Comments

 

             MCV (test code = MCV) 88.1         80.0-94.0                 



[place] 077282-03-26 21:03:00





             Test Item    Value        Reference Range Interpretation Comments

 

             MCH (test code = MCH) 29.6 pg      27.0-31.0                 



[facility] D[id number] [facility]:03:00





             Test Item    Value        Reference Range Interpretation Comments

 

             MCHC (test code = MCHC) 33.6         32.0-36.0                 



[place] 077282-03-26 21:03:00





             Test Item    Value        Reference Range Interpretation Comments

 

             RDW (test code = RDW) 15.3         11.5-14.5                 



[place] 077282-03-26 21:03:00





             Test Item    Value        Reference Range Interpretation Comments

 

             Platelet (test code = Platelet) 370          133-450               

    



[facility] [id number] 21:03:00





             Test Item    Value        Reference Range Interpretation Comments

 

             MPV (test code = MPV) 8.1          7.4-10.4                  



[facility] [id number] 21:03:00





             Test Item    Value        Reference Range Interpretation Comments

 

             UA Comment 1 (test Suboptimal specimen                           



             code = UA Comment 1) received. Results may be                      

     



                          inaccurate due to the                           



                          age of the specimen.                           



                          Interpret results with                           



                          caution.                               



[facility] [id number] 21:03:00





             Test Item    Value        Reference Range Interpretation Comments

 

             UA Color (test code = Yellow *NA*(3/26/22                          

 



             UA Color)    4:03 PM)                               



[facility] [id number] 21:03:00





             Test Item    Value        Reference Range Interpretation Comments

 

             UA Turbidity (test code Slight *ABN*(3/26/22                       

    



             = UA Turbidity) 4:03 PM)                               



[facility] [id number] 21:03:00





             Test Item    Value        Reference Range Interpretation Comments

 

             UA Spec Grav (test code = UA Spec 1.015 1                          

      



             Grav)                                               



[facility] [id number] 21:03:00





             Test Item    Value        Reference Range Interpretation Comments

 

             UA pH (test code = UA pH) 5.0 1        5.0-8.0                   



[facility] [id number] 21:03:00





             Test Item    Value        Reference Range Interpretation Comments

 

             UA Protein (test code = UA Negative mg/dL                          

 



             Protein)                                            



[facility] [id number] 21:03:00





             Test Item    Value        Reference Range Interpretation Comments

 

             UA Glucose (test code = UA Negative mg/dL                          

 



             Glucose)                                            



[facility] [id number] 21:03:00





             Test Item    Value        Reference Range Interpretation Comments

 

             UA Ketones (test code = UA Negative mg/dL                          

 



             Ketones)                                            



[facility] [id number] 21:03:00





             Test Item    Value        Reference Range Interpretation Comments

 

             UA Bili (test code = Negative *NA*(3/26/22                         

  



             UA Bili)     4:03 PM)                               



[facility] [id number] 21:03:00





             Test Item    Value        Reference Range Interpretation Comments

 

             UA Blood (test code = Negative (3/26/22 4:03                       

    



             UA Blood)    PM)                                    



[facility] [id number] 21:03:00





             Test Item    Value        Reference Range Interpretation Comments

 

             UA Urobilinogen (test code = UA no gt        0.1-1.0               

    



             Urobilinogen)                                        



[facility] [id number] 21:03:00





             Test Item    Value        Reference Range Interpretation Comments

 

             UA Nitrite (test code Negative (3/26/22 4:03                       

    



             = UA Nitrite) PM)                                    



[facility] [id number] 21:03:00





             Test Item    Value        Reference Range Interpretation Comments

 

             UA Leuk Est (test code Large *ABN*(3/26/22                         

  



             = UA Leuk Est) 4:03 PM)                               



[facility] [id number] 21:03:00





             Test Item    Value        Reference Range Interpretation Comments

 

             UA WBC (test code = 64           See_Comment                [Automa

huma message] The



             UA WBC)                                             system which ge

nerated this



                                                                 result transmit

huma



                                                                 reference range

: <=5. The



                                                                 reference range

 was not



                                                                 used to interpr

et this



                                                                 result as zayda

l/abnormal.



[facility] [id number] 21:03:00





             Test Item    Value        Reference Range Interpretation Comments

 

             UA RBC (test code = 2            See_Comment                [Automa

huma message] The



             UA RBC)                                             system which ge

nerated this



                                                                 result transmit

uhma



                                                                 reference range

: <=2. The



                                                                 reference range

 was not



                                                                 used to interpr

et this



                                                                 result as zayda

l/abnormal.



[facility] [id number] 21:03:00





             Test Item    Value        Reference Range Interpretation Comments

 

             UA Mucus (test code = UA Mucus) Few /LPF                           

    



[facility] [id number] 21:03:00





             Test Item    Value        Reference Range Interpretation Comments

 

             UA Sq Epi (test code = UA Sq Epi) None Seen                        

      



[facility]2022-03-26 21:03:00





             Test Item    Value        Reference Range Interpretation Comments

 

             Glucose Lvl (test code = Glucose Lvl) 126          70-99           

          



[facility]2022-03-26 21:03:00





             Test Item    Value        Reference Range Interpretation Comments

 

             BUN (test code = BUN) 16           7-22                      



[place] 017232-03-26 21:03:00





             Test Item    Value        Reference Range Interpretation Comments

 

             Creatinine Lvl (test code = Creatinine 0.76         0.50-1.40      

           



             Lvl)                                                



[facility]2022-03-26 21:03:00





             Test Item    Value        Reference Range Interpretation Comments

 

             Sodium Lvl (test code = Sodium Lvl) 140          135-145           

        



[facility]2022-03-26 21:03:00





             Test Item    Value        Reference Range Interpretation Comments

 

             Potassium Lvl (test code = Potassium 3.3          3.5-5.1          

         



             Lvl)                                                



[place] 017232-03-26 21:03:00





             Test Item    Value        Reference Range Interpretation Comments

 

             Chloride Lvl (test code = Chloride Lvl) 111                  

            



[place] 017232-03-26 21:03:00





             Test Item    Value        Reference Range Interpretation Comments

 

             CO2 (test code = CO2) 22           24-32                     



[place] 017232-03-26 21:03:00





             Test Item    Value        Reference Range Interpretation Comments

 

             Calcium Lvl (test code = Calcium Lvl) 8.5          8.5-10.5        

          



[place] 017232-03-26 21:03:00





             Test Item    Value        Reference Range Interpretation Comments

 

             AGAP (test code = AGAP) 10.3         10.0-20.0                 



[place] 017232-03-26 21:03:00





             Test Item    Value        Reference Range Interpretation Comments

 

             eGFR (test code = eGFR) 117                                    



[place] 017232-03-26 21:03:00





             Test Item    Value        Reference Range Interpretation Comments

 

             Lactic Acid Lvl (test code = Lactic 1.1          0.5-2.2           

        



             Acid Lvl)                                           



[place] 077282-03-26 21:03:00





             Test Item    Value        Reference Range Interpretation Comments

 

             Segs (test code = Segs) 68.9         45.0-75.0                 



[place] [address]-03-26 21:03:00





             Test Item    Value        Reference Range Interpretation Comments

 

             Lymphocytes (test code = Lymphocytes) 20.4         20.0-40.0       

          



[place] [address]-03-26 21:03:00





             Test Item    Value        Reference Range Interpretation Comments

 

             Monocytes (test code = Monocytes) 8.2          2.0-12.0            

      



[place] [address]-03-26 21:03:00





             Test Item    Value        Reference Range Interpretation Comments

 

             Eosinophils (test code = 2.2          See_Comment                [A

utomated message] The



             Eosinophils)                                        system which ge

nerated



                                                                 this result tra

nsmitted



                                                                 reference range

: <=4.0.



                                                                 The reference r

zhen was



                                                                 not used to int

erpret



                                                                 this result as



                                                                 normal/abnormal

.



[place] 077282-03-26 21:03:00





             Test Item    Value        Reference Range Interpretation Comments

 

             Basophils (test code = 0.3          See_Comment                [Aut

omated message] The



             Basophils)                                          system which ge

nerated



                                                                 this result tra

nsmitted



                                                                 reference range

: <=1.0.



                                                                 The reference r

zhen was



                                                                 not used to int

erpret



                                                                 this result as



                                                                 normal/abnormal

.



[facility] [id number] 21:03:00





             Test Item    Value        Reference Range Interpretation Comments

 

             Neutrophils # (test code = Neutrophils 5.5          1.5-8.1        

           



             #)                                                  



[facility] [id number] 21:03:00





             Test Item    Value        Reference Range Interpretation Comments

 

             Lymphocytes # (test code = Lymphocytes 1.6          1.0-5.5        

           



             #)                                                  



[facility] [id number] 21:03:00





             Test Item    Value        Reference Range Interpretation Comments

 

             Monocytes # (test code 0.7          See_Comment                [Aut

omated message] The



             = Monocytes #)                                        system which 

generated



                                                                 this result tra

nsmitted



                                                                 reference range

: <=0.8.



                                                                 The reference r

zhen was



                                                                 not used to int

erpret



                                                                 this result as



                                                                 normal/abnormal

.



[facility] [id number] 21:03:00





             Test Item    Value        Reference Range Interpretation Comments

 

             Eosinophils # (test code 0.2          See_Comment                [A

utomated message] The



             = Eosinophils #)                                        system whic

h generated



                                                                 this result tra

nsmitted



                                                                 reference range

: <=0.5.



                                                                 The reference r

zhen was



                                                                 not used to int

erpret



                                                                 this result as



                                                                 normal/abnormal

.



[facility] [id number] 21:03:00





             Test Item    Value        Reference Range Interpretation Comments

 

             WBC (test code = WBC) 8.0          3.7-10.4                  



[facility] [id number] 21:03:00





             Test Item    Value        Reference Range Interpretation Comments

 

             RBC (test code = RBC) 5.37         4.70-6.10                 



[place] 077282-03-26 21:03:00





             Test Item    Value        Reference Range Interpretation Comments

 

             Hgb (test code = Hgb) 15.9         14.0-18.0                 



[place] 077282-03-26 21:03:00





             Test Item    Value        Reference Range Interpretation Comments

 

             Hct (test code = Hct) 47.3         42.0-54.0                 



[place] 077282-03-26 21:03:00





             Test Item    Value        Reference Range Interpretation Comments

 

             MCV (test code = MCV) 88.1         80.0-94.0                 



[facility] [id number] 21:03:00





             Test Item    Value        Reference Range Interpretation Comments

 

             MCH (test code = MCH) 29.6 pg      27.0-31.0                 



[facility] Dall[id number] 21:03:00





             Test Item    Value        Reference Range Interpretation Comments

 

             MCHC (test code = MCHC) 33.6         32.0-36.0                 



[facility] Da[id number] 21:03:00





             Test Item    Value        Reference Range Interpretation Comments

 

             RDW (test code = RDW) 15.3         11.5-14.5                 



[facility] [id number] 21:03:00





             Test Item    Value        Reference Range Interpretation Comments

 

             Platelet (test code = Platelet) 370          133-450               

    



[facility] DallasLkxhbioZTVMWUUGGO7459-60-81 21:03:00





             Test Item    Value        Reference Range Interpretation Comments

 

             MPV (test code = MPV) 8.1          7.4-10.4                  



[facility] [id number] 21:03:00





             Test Item    Value        Reference Range Interpretation Comments

 

             UA Comment 1 (test Suboptimal specimen                           



             code = UA Comment 1) received. Results may be                      

     



                          inaccurate due to the                           



                          age of the specimen.                           



                          Interpret results with                           



                          caution.                               



[facility] [id number] 21:03:00





             Test Item    Value        Reference Range Interpretation Comments

 

             UA Color (test code = Yellow *NA*(3/26/22                          

 



             UA Color)    4:03 PM)                               



[facility] [id number] 21:03:00





             Test Item    Value        Reference Range Interpretation Comments

 

             UA Turbidity (test code Slight *ABN*(3/26/22                       

    



             = UA Turbidity) 4:03 PM)                               



[facility] [id number] 21:03:00





             Test Item    Value        Reference Range Interpretation Comments

 

             UA Spec Grav (test code = UA Spec 1.015 1                          

      



             Grav)                                               



[facility] AND [id number] 21:03:00





             Test Item    Value        Reference Range Interpretation Comments

 

             UA pH (test code = UA pH) 5.0 1        5.0-8.0                   



[facility] AND [id number] 21:03:00





             Test Item    Value        Reference Range Interpretation Comments

 

             UA Protein (test code = UA Negative mg/dL                          

 



             Protein)                                            



[facility] AND [id number] 21:03:00





             Test Item    Value        Reference Range Interpretation Comments

 

             UA Glucose (test code = UA Negative mg/dL                          

 



             Glucose)                                            



[facility] AND [id number] 21:03:00





             Test Item    Value        Reference Range Interpretation Comments

 

             UA Ketones (test code = UA Negative mg/dL                          

 



             Ketones)                                            



[facility] AND [id number] 21:03:00





             Test Item    Value        Reference Range Interpretation Comments

 

             UA Bili (test code = Negative *NA*(3/26/22                         

  



             UA Bili)     4:03 PM)                               



[facility] AND [id number] 21:03:00





             Test Item    Value        Reference Range Interpretation Comments

 

             UA Blood (test code = Negative (3/26/22 4:03                       

    



             UA Blood)    PM)                                    



[facility] AND [id number] 21:03:00





             Test Item    Value        Reference Range Interpretation Comments

 

             UA Urobilinogen (test code = UA no gt        0.1-1.0               

    



             Urobilinogen)                                        



[facility] AND [id number] 21:03:00





             Test Item    Value        Reference Range Interpretation Comments

 

             UA Nitrite (test code Negative (3/26/22 4:03                       

    



             = UA Nitrite) PM)                                    



[facility] AND [id number] 21:03:00





             Test Item    Value        Reference Range Interpretation Comments

 

             UA Leuk Est (test code Large *ABN*(3/26/22                         

  



             = UA Leuk Est) 4:03 PM)                               



[facility] AND [id number] 21:03:00





             Test Item    Value        Reference Range Interpretation Comments

 

             UA WBC (test code = 64           See_Comment                [Automa

huma message] The



             UA WBC)                                             system which ge

nerated this



                                                                 result transmit

huma



                                                                 reference range

: <=5. The



                                                                 reference range

 was not



                                                                 used to interpr

et this



                                                                 result as zayda

l/abnormal.



[facility] AND [id number] 21:03:00





             Test Item    Value        Reference Range Interpretation Comments

 

             UA RBC (test code = 2            See_Comment                [Automa

huma message] The



             UA RBC)                                             system which ge

nerated this



                                                                 result transmit

huma



                                                                 reference range

: <=2. The



                                                                 reference range

 was not



                                                                 used to interpr

et this



                                                                 result as zayda

l/abnormal.



[facility] AND [id number] 21:03:00





             Test Item    Value        Reference Range Interpretation Comments

 

             UA Mucus (test code = UA Mucus) Few /LPF                           

    



[facility] AND [id number] 21:03:00





             Test Item    Value        Reference Range Interpretation Comments

 

             UA Sq Epi (test code = UA Sq Epi) None Seen                        

      



[place] 017232-03-26 21:03:00





             Test Item    Value        Reference Range Interpretation Comments

 

             Glucose Lvl (test code = Glucose Lvl) 126          70-99           

          



[place] 017232-03-26 21:03:00





             Test Item    Value        Reference Range Interpretation Comments

 

             BUN (test code = BUN) 16           7-22                      



[place] 017232-03-26 21:03:00





             Test Item    Value        Reference Range Interpretation Comments

 

             Creatinine Lvl (test code = Creatinine 0.76         0.50-1.40      

           



             Lvl)                                                



[place] 017232-03-26 21:03:00





             Test Item    Value        Reference Range Interpretation Comments

 

             Sodium Lvl (test code = Sodium Lvl) 140          135-145           

        



[place] 017232-03-26 21:03:00





             Test Item    Value        Reference Range Interpretation Comments

 

             Potassium Lvl (test code = Potassium 3.3          3.5-5.1          

         



             Lvl)                                                



[place] 017232-03-26 21:03:00





             Test Item    Value        Reference Range Interpretation Comments

 

             Chloride Lvl (test code = Chloride Lvl) 111                  

            



[place] 017232-03-26 21:03:00





             Test Item    Value        Reference Range Interpretation Comments

 

             CO2 (test code = CO2) 22           24-32                     



[place] 017232-03-26 21:03:00





             Test Item    Value        Reference Range Interpretation Comments

 

             Calcium Lvl (test code = Calcium Lvl) 8.5          8.5-10.5        

          



[place] 017232-03-26 21:03:00





             Test Item    Value        Reference Range Interpretation Comments

 

             AGAP (test code = AGAP) 10.3         10.0-20.0                 



[place] 017232-03-26 21:03:00





             Test Item    Value        Reference Range Interpretation Comments

 

             eGFR (test code = eGFR) 117                                    



[place] 017232-03-26 21:03:00





             Test Item    Value        Reference Range Interpretation Comments

 

             Lactic Acid Lvl (test code = Lactic 1.1          0.5-2.2           

        



             Acid Lvl)                                           



[place] 077282-03-26 21:03:00





             Test Item    Value        Reference Range Interpretation Comments

 

             Segs (test code = Segs) 68.9         45.0-75.0                 



[place] 077282-03-26 21:03:00





             Test Item    Value        Reference Range Interpretation Comments

 

             Lymphocytes (test code = Lymphocytes) 20.4         20.0-40.0       

          



[place] 077282-03-26 21:03:00





             Test Item    Value        Reference Range Interpretation Comments

 

             Monocytes (test code = Monocytes) 8.2          2.0-12.0            

      



[place] 077282-03-26 21:03:00





             Test Item    Value        Reference Range Interpretation Comments

 

             Eosinophils (test code = 2.2          See_Comment                [A

utomated message] The



             Eosinophils)                                        system which ge

nerated



                                                                 this result tra

nsmitted



                                                                 reference range

: <=4.0.



                                                                 The reference r

zhen was



                                                                 not used to int

erpret



                                                                 this result as



                                                                 normal/abnormal

.



[place] 077282-03-26 21:03:00





             Test Item    Value        Reference Range Interpretation Comments

 

             Basophils (test code = 0.3          See_Comment                [Aut

omated message] The



             Basophils)                                          system which ge

nerated



                                                                 this result tra

nsmitted



                                                                 reference range

: <=1.0.



                                                                 The reference r

zhen was



                                                                 not used to int

erpret



                                                                 this result as



                                                                 normal/abnormal

.



[place] 077282-03-26 21:03:00





             Test Item    Value        Reference Range Interpretation Comments

 

             Neutrophils # (test code = Neutrophils 5.5          1.5-8.1        

           



             #)                                                  



[place] 077282-03-26 21:03:00





             Test Item    Value        Reference Range Interpretation Comments

 

             Lymphocytes # (test code = Lymphocytes 1.6          1.0-5.5        

           



             #)                                                  



[place] 077282-03-26 21:03:00





             Test Item    Value        Reference Range Interpretation Comments

 

             Monocytes # (test code 0.7          See_Comment                [Aut

omated message] The



             = Monocytes #)                                        system which 

generated



                                                                 this result tra

nsmitted



                                                                 reference range

: <=0.8.



                                                                 The reference r

zhen was



                                                                 not used to int

erpret



                                                                 this result as



                                                                 normal/abnormal

.



[place] 077282-03-26 21:03:00





             Test Item    Value        Reference Range Interpretation Comments

 

             Eosinophils # (test code 0.2          See_Comment                [A

utomated message] The



             = Eosinophils #)                                        system whic

h generated



                                                                 this result tra

nsmitted



                                                                 reference range

: <=0.5.



                                                                 The reference r

zhen was



                                                                 not used to int

erpret



                                                                 this result as



                                                                 normal/abnormal

.



[place] 077282-03-26 21:03:00





             Test Item    Value        Reference Range Interpretation Comments

 

             WBC (test code = WBC) 8.0          3.7-10.4                  



[id number]





             Test Item    Value        Reference Range Interpretation Comments

 

             RBC (test code = RBC) 5.37         4.70-6.10                 



[facility]vy[facility]PO[facility]RUZHHM3389-53-12 21:03:00





             Test Item    Value        Reference Range Interpretation Comments

 

             Hgb (test code = Hgb) 15.9         14.0-18.0                 



[facility]qzybyvSLDCF[facility]TYNA3983-89-66 21:03:00





             Test Item    Value        Reference Range Interpretation Comments

 

             Hct (test code = Hct) 47.3         42.0-54.0                 



[facility]jlhuWBCJODVQGN5964-78-30 21:03:00





             Test Item    Value        Reference Range Interpretation Comments

 

             MCV (test code = MCV) 88.1         80.0-94.0                 



[facility]ickdzzGAXVCXNWVQ2806-68-41 21:03:00





             Test Item    Value        Reference Range Interpretation Comments

 

             MCH (test code = MCH) 29.6 pg      27.0-31.0                 



[id number] 21:03:00





             Test Item    Value        Reference Range Interpretation Comments

 

             MCHC (test code = MCHC) 33.6         32.0-36.0                 



[facility]vkrzafBYYAMCBZRA7055-78-30 21:03:00





             Test Item    Value        Reference Range Interpretation Comments

 

             RDW (test code = RDW) 15.3         11.5-14.5                 



[facility]WtfwityZTRYBMSKES4679-38-59 21:03:00





             Test Item    Value        Reference Range Interpretation Comments

 

             Platelet (test code = Platelet) 370          133-450               

    



[facility]emxuevMQTCTBGOVR7205-24-41 21:03:00





             Test Item    Value        Reference Range Interpretation Comments

 

             MPV (test code = MPV) 8.1          7.4-10.4                  



[facility]2022-03-26 21:03:00





             Test Item    Value        Reference Range Interpretation Comments

 

             UA Comment 1 (test Suboptimal specimen                           



             code = UA Comment 1) received. Results may be                      

     



                          inaccurate due to the                           



                          age of the specimen.                           



                          Interpret results with                           



                          caution.                               



[facility] [id number] 21:03:00





             Test Item    Value        Reference Range Interpretation Comments

 

             UA Color (test code = Yellow *NA*(3/26/22                          

 



             UA Color)    4:03 PM)                               



[facility] AND [id number] 21:03:00





             Test Item    Value        Reference Range Interpretation Comments

 

             UA Turbidity (test code Slight *ABN*(3/26/22                       

    



             = UA Turbidity) 4:03 PM)                               



[facility] AND [id number] 21:03:00





             Test Item    Value        Reference Range Interpretation Comments

 

             UA Spec Grav (test code = UA Spec 1.015 1                          

      



             Grav)                                               



[facility] AND [id number] 21:03:00





             Test Item    Value        Reference Range Interpretation Comments

 

             UA pH (test code = UA pH) 5.0 1        5.0-8.0                   



[facility] AND [id number] 21:03:00





             Test Item    Value        Reference Range Interpretation Comments

 

             UA Protein (test code = UA Negative mg/dL                          

 



             Protein)                                            



[facility] AND [id number] 21:03:00





             Test Item    Value        Reference Range Interpretation Comments

 

             UA Glucose (test code = UA Negative mg/dL                          

 



             Glucose)                                            



[facility] AND [id number] 21:03:00





             Test Item    Value        Reference Range Interpretation Comments

 

             UA Ketones (test code = UA Negative mg/dL                          

 



             Ketones)                                            



[facility] AND [id number] 21:03:00





             Test Item    Value        Reference Range Interpretation Comments

 

             UA Bili (test code = Negative *NA*(3/26/22                         

  



             UA Bili)     4:03 PM)                               



[facility] AND [id number] 21:03:00





             Test Item    Value        Reference Range Interpretation Comments

 

             UA Blood (test code = Negative (3/26/22 4:03                       

    



             UA Blood)    PM)                                    



[facility] AND [id number] 21:03:00





             Test Item    Value        Reference Range Interpretation Comments

 

             UA Urobilinogen (test code = UA no gt        0.1-1.0               

    



             Urobilinogen)                                        



[facility] AND [id number] 21:03:00





             Test Item    Value        Reference Range Interpretation Comments

 

             UA Nitrite (test code Negative (3/26/22 4:03                       

    



             = UA Nitrite) PM)                                    



[facility] AND [id number] 21:03:00





             Test Item    Value        Reference Range Interpretation Comments

 

             UA Leuk Est (test code Large *ABN*(3/26/22                         

  



             = UA Leuk Est) 4:03 PM)                               



[facility] AND [id number] 21:03:00





             Test Item    Value        Reference Range Interpretation Comments

 

             UA WBC (test code = 64           See_Comment                [Automa

huma message] The



             UA WBC)                                             system which ge

nerated this



                                                                 result transmit

huma



                                                                 reference range

: <=5. The



                                                                 reference range

 was not



                                                                 used to interpr

et this



                                                                 result as zayda

l/abnormal.



[facility] AND [id number] 21:03:00





             Test Item    Value        Reference Range Interpretation Comments

 

             UA RBC (test code = 2            See_Comment                [Automa

huma message] The



             UA RBC)                                             system which ge

nerated this



                                                                 result transmit

huma



                                                                 reference range

: <=2. The



                                                                 reference range

 was not



                                                                 used to interpr

et this



                                                                 result as zayda

l/abnormal.



[facility] AND [id number] 21:03:00





             Test Item    Value        Reference Range Interpretation Comments

 

             UA Mucus (test code = UA Mucus) Few /LPF                           

    



[facility] AND [id number] 21:03:00





             Test Item    Value        Reference Range Interpretation Comments

 

             UA Sq Epi (test code = UA Sq Epi) None Seen                        

      



[facility]2022-03-26 21:03:00





             Test Item    Value        Reference Range Interpretation Comments

 

             Glucose Lvl (test code = Glucose Lvl) 126          70-99           

          



[id number]:03:00





             Test Item    Value        Reference Range Interpretation Comments

 

             BUN (test code = BUN) 16           7-22                      



[facility]2022-03-26 21:03:00





             Test Item    Value        Reference Range Interpretation Comments

 

             Creatinine Lvl (test code = Creatinine 0.76         0.50-1.40      

           



             Lvl)                                                



[facility]2022-03-26 21:03:00





             Test Item    Value        Reference Range Interpretation Comments

 

             Sodium Lvl (test code = Sodium Lvl) 140          135-145           

        



[facility] [id number] 21:03:00





             Test Item    Value        Reference Range Interpretation Comments

 

             Potassium Lvl (test code = Potassium 3.3          3.5-5.1          

         



             Lvl)                                                



[facility]2022-03-26 21:03:00





             Test Item    Value        Reference Range Interpretation Comments

 

             Chloride Lvl (test code = Chloride Lvl) 111                  

            



[place] 017232-03-26 21:03:00





             Test Item    Value        Reference Range Interpretation Comments

 

             CO2 (test code = CO2) 22           24-32                     



[place] 017232-03-26 21:03:00





             Test Item    Value        Reference Range Interpretation Comments

 

             Calcium Lvl (test code = Calcium Lvl) 8.5          8.5-10.5        

          



[place] 017232-03-26 21:03:00





             Test Item    Value        Reference Range Interpretation Comments

 

             AGAP (test code = AGAP) 10.3         10.0-20.0                 



[place] 017232-03-26 21:03:00





             Test Item    Value        Reference Range Interpretation Comments

 

             eGFR (test code = eGFR) 117                                    



[place] 017232-03-26 21:03:00





             Test Item    Value        Reference Range Interpretation Comments

 

             Lactic Acid Lvl (test code = Lactic 1.1          0.5-2.2           

        



             Acid Lvl)                                           



[place] 077282-03-26 21:03:00





             Test Item    Value        Reference Range Interpretation Comments

 

             Segs (test code = Segs) 68.9         45.0-75.0                 



[place] [address]302-03-26 21:03:00





             Test Item    Value        Reference Range Interpretation Comments

 

             Lymphocytes (test code = Lymphocytes) 20.4         20.0-40.0       

          



[place] [address]302-03-26 21:03:00





             Test Item    Value        Reference Range Interpretation Comments

 

             Monocytes (test code = Monocytes) 8.2          2.0-12.0            

      



[place] [address]302-03-26 21:03:00





             Test Item    Value        Reference Range Interpretation Comments

 

             Eosinophils (test code = 2.2          See_Comment                [A

utomated message] The



             Eosinophils)                                        system which 

nerated



                                                                 this result tra

nsmitted



                                                                 reference range

: <=4.0.



                                                                 The reference r

zhen was



                                                                 not used to int

erpret



                                                                 this result as



                                                                 normal/abnormal

.



[place] 077282-03-26 21:03:00





             Test Item    Value        Reference Range Interpretation Comments

 

             Basophils (test code = 0.3          See_Comment                [Aut

omated message] The



             Basophils)                                          system which ge

nerated



                                                                 this result tra

nsmitted



                                                                 reference range

: <=1.0.



                                                                 The reference r

zhen was



                                                                 not used to int

erpret



                                                                 this result as



                                                                 normal/abnormal

.



[place] [address]302-03-26 21:03:00





             Test Item    Value        Reference Range Interpretation Comments

 

             Neutrophils # (test code = Neutrophils 5.5          1.5-8.1        

           



             #)                                                  



[place] 077282-03-26 21:03:00





             Test Item    Value        Reference Range Interpretation Comments

 

             Lymphocytes # (test code = Lymphocytes 1.6          1.0-5.5        

           



             #)                                                  



[facility]





             Test Item    Value        Reference Range Interpretation Comments

 

             Monocytes # (test code 0.7          See_Comment                [Aut

omated message] The



             = Monocytes #)                                        system which 

generated



                                                                 this result tra

nsmitted



                                                                 reference range

: <=0.8.



                                                                 The reference r

zhen was



                                                                 not used to int

erpret



                                                                 this result as



                                                                 normal/abnormal

.



[facility]ohskkyNYOJKVJXHQ1236-10-54 21:03:00





             Test Item    Value        Reference Range Interpretation Comments

 

             Eosinophils # (test code 0.2          See_Comment                [A

utomated message] The



             = Eosinophils #)                                        system whic

h generated



                                                                 this result tra

nsmitted



                                                                 reference range

: <=0.5.



                                                                 The reference r

zhen was



                                                                 not used to int

erpret



                                                                 this result as



                                                                 normal/abnormal

.



[facility] DallasIylmcqeRTKEGXHKXI4247-74-81 21:03:00





             Test Item    Value        Reference Range Interpretation Comments

 

             WBC (test code = WBC) 8.0          3.7-10.4                  



[facility] DallasZxqponzPJNJXSYULR1627-37-56 21:03:00





             Test Item    Value        Reference Range Interpretation Comments

 

             RBC (test code = RBC) 5.37         4.70-6.10                 



[facility] DallasWzjtulfFIMKFPSMQG7482-73-33 21:03:00





             Test Item    Value        Reference Range Interpretation Comments

 

             Hgb (test code = Hgb) 15.9         14.0-18.0                 



[facility] DallasJfnebfiPPTATJWCMP0318-47-93 21:03:00





             Test Item    Value        Reference Range Interpretation Comments

 

             Hct (test code = Hct) 47.3         42.0-54.0                 



[place] 077282-03-26 21:03:00





             Test Item    Value        Reference Range Interpretation Comments

 

             MCV (test code = MCV) 88.1         80.0-94.0                 



[place] 077282-03-26 21:03:00





             Test Item    Value        Reference Range Interpretation Comments

 

             MCH (test code = MCH) 29.6 pg      27.0-31.0                 



[place] 077282-03-26 21:03:00





             Test Item    Value        Reference Range Interpretation Comments

 

             MCHC (test code = MCHC) 33.6         32.0-36.0                 



[place] 077282-03-26 21:03:00





             Test Item    Value        Reference Range Interpretation Comments

 

             RDW (test code = RDW) 15.3         11.5-14.5                 



[facility] DallasBwugjpjPARGICMSOI1157-32-95 21:03:00





             Test Item    Value        Reference Range Interpretation Comments

 

             Platelet (test code = Platelet) 370          133-450               

    



[facility] DallasBbikycnAMDGDDEGGN1216-34-61 21:03:00





             Test Item    Value        Reference Range Interpretation Comments

 

             MPV (test code = MPV) 8.1          7.4-10.4                  



[facility] [id number] 21:03:00





             Test Item    Value        Reference Range Interpretation Comments

 

             UA Comment 1 (test Suboptimal specimen                           



             code = UA Comment 1) received. Results may be                      

     



                          inaccurate due to the                           



                          age of the specimen.                           



                          Interpret results with                           



                          caution.                               



[facility] AND [id number] 21:03:00





             Test Item    Value        Reference Range Interpretation Comments

 

             UA Color (test code = Yellow *NA*(3/26/22                          

 



             UA Color)    4:03 PM)                               



[facility] AND [id number] 21:03:00





             Test Item    Value        Reference Range Interpretation Comments

 

             UA Turbidity (test code Slight *ABN*(3/26/22                       

    



             = UA Turbidity) 4:03 PM)                               



[facility] AND [id number] 21:03:00





             Test Item    Value        Reference Range Interpretation Comments

 

             UA Spec Grav (test code = UA Spec 1.015 1                          

      



             Grav)                                               



[facility] AND [id number] 21:03:00





             Test Item    Value        Reference Range Interpretation Comments

 

             UA pH (test code = UA pH) 5.0 1        5.0-8.0                   



[facility] AND [id number] 21:03:00





             Test Item    Value        Reference Range Interpretation Comments

 

             UA Protein (test code = UA Negative mg/dL                          

 



             Protein)                                            



[facility] AND [id number] 21:03:00





             Test Item    Value        Reference Range Interpretation Comments

 

             UA Glucose (test code = UA Negative mg/dL                          

 



             Glucose)                                            



[facility] AND [id number] 21:03:00





             Test Item    Value        Reference Range Interpretation Comments

 

             UA Ketones (test code = UA Negative mg/dL                          

 



             Ketones)                                            



[facility] AND [id number] 21:03:00





             Test Item    Value        Reference Range Interpretation Comments

 

             UA Bili (test code = Negative *NA*(3/26/22                         

  



             UA Bili)     4:03 PM)                               



[facility] AND [id number] 21:03:00





             Test Item    Value        Reference Range Interpretation Comments

 

             UA Blood (test code = Negative (3/26/22 4:03                       

    



             UA Blood)    PM)                                    



[facility] AND [id number] 21:03:00





             Test Item    Value        Reference Range Interpretation Comments

 

             UA Urobilinogen (test code = UA no gt        0.1-1.0               

    



             Urobilinogen)                                        



[facility] AND [id number] 21:03:00





             Test Item    Value        Reference Range Interpretation Comments

 

             UA Nitrite (test code Negative (3/26/22 4:03                       

    



             = UA Nitrite) PM)                                    



[facility] AND [id number] 21:03:00





             Test Item    Value        Reference Range Interpretation Comments

 

             UA Leuk Est (test code Large *ABN*(3/26/22                         

  



             = UA Leuk Est) 4:03 PM)                               



[facility] AND [id number] 21:03:00





             Test Item    Value        Reference Range Interpretation Comments

 

             UA WBC (test code = 64           See_Comment                [Automa

huma message] The



             UA WBC)                                             system which ge

nerated this



                                                                 result transmit

huma



                                                                 reference range

: <=5. The



                                                                 reference range

 was not



                                                                 used to interpr

et this



                                                                 result as zayda

l/abnormal.



[facility] AND [id number] 21:03:00





             Test Item    Value        Reference Range Interpretation Comments

 

             UA RBC (test code = 2            See_Comment                [Automa

huma message] The



             UA RBC)                                             system which ge

nerated this



                                                                 result transmit

huma



                                                                 reference range

: <=2. The



                                                                 reference range

 was not



                                                                 used to interpr

et this



                                                                 result as zayda

l/abnormal.



[facility] AND [id number] 21:03:00





             Test Item    Value        Reference Range Interpretation Comments

 

             UA Mucus (test code = UA Mucus) Few /LPF                           

    



[facility] AND [id number] 21:03:00





             Test Item    Value        Reference Range Interpretation Comments

 

             UA Sq Epi (test code = UA Sq Epi) None Seen                        

      



[facility]2022-03-26 21:03:00





             Test Item    Value        Reference Range Interpretation Comments

 

             Glucose Lvl (test code = Glucose Lvl) 126          70-99           

          



[facility]2022-03-26 21:03:00





             Test Item    Value        Reference Range Interpretation Comments

 

             BUN (test code = BUN) 16           7-22                      



[facility]2022-03-26 21:03:00





             Test Item    Value        Reference Range Interpretation Comments

 

             Creatinine Lvl (test code = Creatinine 0.76         0.50-1.40      

           



             Lvl)                                                



[place] 017232-03-26 21:03:00





             Test Item    Value        Reference Range Interpretation Comments

 

             Sodium Lvl (test code = Sodium Lvl) 140          135-145           

        



[place] 017232-03-26 21:03:00





             Test Item    Value        Reference Range Interpretation Comments

 

             Potassium Lvl (test code = Potassium 3.3          3.5-5.1          

         



             Lvl)                                                



[place] 017232-03-26 21:03:00





             Test Item    Value        Reference Range Interpretation Comments

 

             Chloride Lvl (test code = Chloride Lvl) 111                  

            



[place] 017232-03-26 21:03:00





             Test Item    Value        Reference Range Interpretation Comments

 

             CO2 (test code = CO2) 22           24-32                     



[place] 017232-03-26 21:03:00





             Test Item    Value        Reference Range Interpretation Comments

 

             Calcium Lvl (test code = Calcium Lvl) 8.5          8.5-10.5        

          



[place] 017232-03-26 21:03:00





             Test Item    Value        Reference Range Interpretation Comments

 

             AGAP (test code = AGAP) 10.3         10.0-20.0                 



[place] 017232-03-26 21:03:00





             Test Item    Value        Reference Range Interpretation Comments

 

             eGFR (test code = eGFR) 117                                    



[place] 017232-03-26 21:03:00





             Test Item    Value        Reference Range Interpretation Comments

 

             Lactic Acid Lvl (test code = Lactic 1.1          0.5-2.2           

        



             Acid Lvl)                                           



[place] 077282-03-26 21:03:00





             Test Item    Value        Reference Range Interpretation Comments

 

             Segs (test code = Segs) 68.9         45.0-75.0                 



[place] 077282-03-26 21:03:00





             Test Item    Value        Reference Range Interpretation Comments

 

             Lymphocytes (test code = Lymphocytes) 20.4         20.0-40.0       

          



[place] 077282-03-26 21:03:00





             Test Item    Value        Reference Range Interpretation Comments

 

             Monocytes (test code = Monocytes) 8.2          2.0-12.0            

      



[place] [address]-03-26 21:03:00





             Test Item    Value        Reference Range Interpretation Comments

 

             Eosinophils (test code = 2.2          See_Comment                [A

utomated message] The



             Eosinophils)                                        system which ge

nerated



                                                                 this result tra

nsmitted



                                                                 reference range

: <=4.0.



                                                                 The reference r

zhen was



                                                                 not used to int

erpret



                                                                 this result as



                                                                 normal/abnormal

.



[facility] DallasIrnizlxQXCZAPTMYD9119-52-15 [facility]:03:00





             Test Item    Value        Reference Range Interpretation Comments

 

             Basophils (test code = 0.3          See_Comment                [Aut

omated message] The



             Basophils)                                          system which ge

nerated



                                                                 this result tra

nsmitted



                                                                 reference range

: <=1.0.



                                                                 The reference r

zhen was



                                                                 not used to int

erpret



                                                                 this result as



                                                                 normal/abnormal

.



[facility] DallasYblqxejECNHQRMUFB0915-19-29 21:03:00





             Test Item    Value        Reference Range Interpretation Comments

 

             Neutrophils # (test code = Neutrophils 5.5          1.5-8.1        

           



             #)                                                  



[facility] DallasXimgrcsQAHGHJJMGD1077-72-51 21:03:00





             Test Item    Value        Reference Range Interpretation Comments

 

             Lymphocytes # (test code = Lymphocytes 1.6          1.0-5.5        

           



             #)                                                  



[facility] [id number] 21:03:00





             Test Item    Value        Reference Range Interpretation Comments

 

             Monocytes # (test code 0.7          See_Comment                [Aut

omated message] The



             = Monocytes #)                                        system which 

generated



                                                                 this result tra

nsmitted



                                                                 reference range

: <=0.8.



                                                                 The reference r

zhen was



                                                                 not used to int

erpret



                                                                 this result as



                                                                 normal/abnormal

.



[facility] DallasXaxelcfEDWXIWQHFE8576-82-41 21:03:00





             Test Item    Value        Reference Range Interpretation Comments

 

             Eosinophils # (test code 0.2          See_Comment                [A

utomated message] The



             = Eosinophils #)                                        system whic

h generated



                                                                 this result tra

nsmitted



                                                                 reference range

: <=0.5.



                                                                 The reference r

zhen was



                                                                 not used to int

erpret



                                                                 this result as



                                                                 normal/abnormal

.



[facility] DallasKgyrqpzDFUXPTNDFH3501-20-05 21:03:00





             Test Item    Value        Reference Range Interpretation Comments

 

             WBC (test code = WBC) 8.0          3.7-10.4                  



[place] 077282-03-26 21:03:00





             Test Item    Value        Reference Range Interpretation Comments

 

             RBC (test code = RBC) 5.37         4.70-6.10                 



[place] 077282-03-26 21:03:00





             Test Item    Value        Reference Range Interpretation Comments

 

             Hgb (test code = Hgb) 15.9         14.0-18.0                 



[place] 077282-03-26 21:03:00





             Test Item    Value        Reference Range Interpretation Comments

 

             Hct (test code = Hct) 47.3         42.0-54.0                 



[facility]





             Test Item    Value        Reference Range Interpretation Comments

 

             MCV (test code = MCV) 88.1         80.0-94.0                 



[facility] DallasCgahxrkMPMWVLIHER8018-53-97 21:03:00





             Test Item    Value        Reference Range Interpretation Comments

 

             MCH (test code = MCH) 29.6 pg      27.0-31.0                 



[facility] DallasFmfybmiATPVJMHITO7132-05-25 21:03:00





             Test Item    Value        Reference Range Interpretation Comments

 

             MCHC (test code = MCHC) 33.6         32.0-36.0                 



[facility] DallasUpouqwhQQENAKXTLO3603-28-17 21:03:00





             Test Item    Value        Reference Range Interpretation Comments

 

             RDW (test code = RDW) 15.3         11.5-14.5                 



[facility] DallasGvjthesDMQRDCRESB8736-81-24 21:03:00





             Test Item    Value        Reference Range Interpretation Comments

 

             Platelet (test code = Platelet) 370          133-450               

    



[facility] DallasUysggtwJTJYBUHSVT8583-41-02 21:03:00





             Test Item    Value        Reference Range Interpretation Comments

 

             MPV (test code = MPV) 8.1          7.4-10.4                  



[facility] [id number] 21:03:00





             Test Item    Value        Reference Range Interpretation Comments

 

             UA Comment 1 (test Suboptimal specimen                           



             code = UA Comment 1) received. Results may be                      

     



                          inaccurate due to the                           



                          age of the specimen.                           



                          Interpret results with                           



                          caution.                               



[facility] AND [id number] 21:03:00





             Test Item    Value        Reference Range Interpretation Comments

 

             UA Color (test code = Yellow *NA*(3/26/22                          

 



             UA Color)    4:03 PM)                               



[facility] AND [id number] 21:03:00





             Test Item    Value        Reference Range Interpretation Comments

 

             UA Turbidity (test code Slight *ABN*(3/26/22                       

    



             = UA Turbidity) 4:03 PM)                               



[facility] AND [id number] 21:03:00





             Test Item    Value        Reference Range Interpretation Comments

 

             UA Spec Grav (test code = UA Spec 1.015 1                          

      



             Grav)                                               



[facility] AND [id number] 21:03:00





             Test Item    Value        Reference Range Interpretation Comments

 

             UA pH (test code = UA pH) 5.0 1        5.0-8.0                   



[facility] AND [id number] 21:03:00





             Test Item    Value        Reference Range Interpretation Comments

 

             UA Protein (test code = UA Negative mg/dL                          

 



             Protein)                                            



[facility] [id number] 21:03:00





             Test Item    Value        Reference Range Interpretation Comments

 

             UA Glucose (test code = UA Negative mg/dL                          

 



             Glucose)                                            



[facility] [id number] 21:03:00





             Test Item    Value        Reference Range Interpretation Comments

 

             UA Ketones (test code = UA Negative mg/dL                          

 



             Ketones)                                            



[facility] AND [id number] 21:03:00





             Test Item    Value        Reference Range Interpretation Comments

 

             UA Bili (test code = Negative *NA*(3/26/22                         

  



             UA Bili)     4:03 PM)                               



[facility] AND [id number] 21:03:00





             Test Item    Value        Reference Range Interpretation Comments

 

             UA Blood (test code = Negative (3/26/22 4:03                       

    



             UA Blood)    PM)                                    



[facility] AND [id number] 21:03:00





             Test Item    Value        Reference Range Interpretation Comments

 

             UA Urobilinogen (test code = UA no gt        0.1-1.0               

    



             Urobilinogen)                                        



[facility] AND [id number] 21:03:00





             Test Item    Value        Reference Range Interpretation Comments

 

             UA Nitrite (test code Negative (3/26/22 4:03                       

    



             = UA Nitrite) PM)                                    



[facility] AND [id number] 21:03:00





             Test Item    Value        Reference Range Interpretation Comments

 

             UA Leuk Est (test code Large *ABN*(3/26/22                         

  



             = UA Leuk Est) 4:03 PM)                               



[facility] AND [id number] 21:03:00





             Test Item    Value        Reference Range Interpretation Comments

 

             UA WBC (test code = 64           See_Comment                [Automa

huma message] The



             UA WBC)                                             system which ge

nerated this



                                                                 result transmit

huma



                                                                 reference range

: <=5. The



                                                                 reference range

 was not



                                                                 used to interpr

et this



                                                                 result as zayda

l/abnormal.



[facility] AND [id number] 21:03:00





             Test Item    Value        Reference Range Interpretation Comments

 

             UA RBC (test code = 2            See_Comment                [Automa

huma message] The



             UA RBC)                                             system which ge

nerated this



                                                                 result transmit

huma



                                                                 reference range

: <=2. The



                                                                 reference range

 was not



                                                                 used to interpr

et this



                                                                 result as zayda

l/abnormal.



[facility] AND [id number] 21:03:00





             Test Item    Value        Reference Range Interpretation Comments

 

             UA Mucus (test code = UA Mucus) Few /LPF                           

    



[facility] AND [id number] 21:03:00





             Test Item    Value        Reference Range Interpretation Comments

 

             UA Sq Epi (test code = UA Sq Epi) None Seen                        

      



[facility]2022-03-26 21:03:00





             Test Item    Value        Reference Range Interpretation Comments

 

             Glucose Lvl (test code = Glucose Lvl) 126          70-99           

          



[place] 017232-03-26 21:03:00





             Test Item    Value        Reference Range Interpretation Comments

 

             BUN (test code = BUN) 16           7-22                      



[place] 017232-03-26 21:03:00





             Test Item    Value        Reference Range Interpretation Comments

 

             Creatinine Lvl (test code = Creatinine 0.76         0.50-1.40      

           



             Lvl)                                                



[facility]2022-03-26 21:03:00





             Test Item    Value        Reference Range Interpretation Comments

 

             Sodium Lvl (test code = Sodium Lvl) 140          135-145           

        



[facility]2022-03-26 21:03:00





             Test Item    Value        Reference Range Interpretation Comments

 

             Potassium Lvl (test code = Potassium 3.3          3.5-5.1          

         



             Lvl)                                                



[facility]2022-03-26 21:03:00





             Test Item    Value        Reference Range Interpretation Comments

 

             Chloride Lvl (test code = Chloride Lvl) 111                  

            



[facility]2022-03-26 21:03:00





             Test Item    Value        Reference Range Interpretation Comments

 

             CO2 (test code = CO2) 22           24-32                     



[place] 017232-03-26 21:03:00





             Test Item    Value        Reference Range Interpretation Comments

 

             Calcium Lvl (test code = Calcium Lvl) 8.5          8.5-10.5        

          



[facility]2022-03-26 21:03:00





             Test Item    Value        Reference Range Interpretation Comments

 

             AGAP (test code = AGAP) 10.3         10.0-20.0                 



[place] 017232-03-26 21:03:00





             Test Item    Value        Reference Range Interpretation Comments

 

             eGFR (test code = eGFR) 117                                    



[place] 017232-03-26 21:03:00





             Test Item    Value        Reference Range Interpretation Comments

 

             Lactic Acid Lvl (test code = Lactic 1.1          0.5-2.2           

        



             Acid Lvl)                                           



[id number]





             Test Item    Value        Reference Range Interpretation Comments

 

             Segs (test code = Segs) 68.9         45.0-75.0                 



[place] 077282-03-26 21:03:00





             Test Item    Value        Reference Range Interpretation Comments

 

             Lymphocytes (test code = Lymphocytes) 20.4         20.0-40.0       

          



[place] 077282-03-26 21:03:00





             Test Item    Value        Reference Range Interpretation Comments

 

             Monocytes (test code = Monocytes) 8.2          2.0-12.0            

      



[place] 077282-03-26 21:03:00





             Test Item    Value        Reference Range Interpretation Comments

 

             Eosinophils (test code = 2.2          See_Comment                [A

utomated message] The



             Eosinophils)                                        system which ge

nerated



                                                                 this result tra

nsmitted



                                                                 reference range

: <=4.0.



                                                                 The reference r

zhen was



                                                                 not used to int

erpret



                                                                 this result as



                                                                 normal/abnormal

.



[id number] 21:03:00





             Test Item    Value        Reference Range Interpretation Comments

 

             Basophils (test code = 0.3          See_Comment                [Aut

omated message] The



             Basophils)                                          system which ge

nerated



                                                                 this result tra

nsmitted



                                                                 reference range

: <=1.0.



                                                                 The reference r

zhen was



                                                                 not used to int

erpret



                                                                 this result as



                                                                 normal/abnormal

.



[id number] 21:03:00





             Test Item    Value        Reference Range Interpretation Comments

 

             Neutrophils # (test code = Neutrophils 5.5          1.5-8.1        

           



             #)                                                  



[place] 077282-03-26 21:03:00





             Test Item    Value        Reference Range Interpretation Comments

 

             Lymphocytes # (test code = Lymphocytes 1.6          1.0-5.5        

           



             #)                                                  



[place] 077282-03-26 21:03:00





             Test Item    Value        Reference Range Interpretation Comments

 

             Monocytes # (test code 0.7          See_Comment                [Aut

omated message] The



             = Monocytes #)                                        system which 

generated



                                                                 this result tra

nsmitted



                                                                 reference range

: <=0.8.



                                                                 The reference r

zhen was



                                                                 not used to int

erpret



                                                                 this result as



                                                                 normal/abnormal

.



[place] 077282-03-26 21:03:00





             Test Item    Value        Reference Range Interpretation Comments

 

             Eosinophils # (test code 0.2          See_Comment                [A

utomated message] The



             = Eosinophils #)                                        system whic

h generated



                                                                 this result tra

nsmitted



                                                                 reference range

: <=0.5.



                                                                 The reference r

zhen was



                                                                 not used to int

erpret



                                                                 this result as



                                                                 normal/abnormal

.



[id number]





             Test Item    Value        Reference Range Interpretation Comments

 

             WBC (test code = WBC) 8.0          3.7-10.4                  



[id number] 21:03:00





             Test Item    Value        Reference Range Interpretation Comments

 

             RBC (test code = RBC) 5.37         4.70-6.10                 



[facility] H[id number] 21:03:00





             Test Item    Value        Reference Range Interpretation Comments

 

             Hgb (test code = Hgb) 15.9         14.0-18.0                 



[facility] Ho[id number] 21:03:00





             Test Item    Value        Reference Range Interpretation Comments

 

             Hct (test code = Hct) 47.3         42.0-54.0                 



[facility] Hos[id number] 21:03:00





             Test Item    Value        Reference Range Interpretation Comments

 

             MCV (test code = MCV) 88.1         80.0-94.0                 



[facility] HospitalGrfrmcsRVMVIEVPRU1224-12-03 21:03:00





             Test Item    Value        Reference Range Interpretation Comments

 

             MCH (test code = MCH) 29.6 pg      27.0-31.0                 



[facility] H[id number] 21:03:00





             Test Item    Value        Reference Range Interpretation Comments

 

             MCHC (test code = MCHC) 33.6         32.0-36.0                 



[facility] HospitalAsgighhVIZCGBTCET0955-07-40 21:03:00





             Test Item    Value        Reference Range Interpretation Comments

 

             RDW (test code = RDW) 15.3         11.5-14.5                 



[facility] H[id number] 21:03:00





             Test Item    Value        Reference Range Interpretation Comments

 

             Platelet (test code = Platelet) 370          133-450               

    



[facility] Hospit[id number] 21:03:00





             Test Item    Value        Reference Range Interpretation Comments

 

             MPV (test code = MPV) 8.1          7.4-10.4                  



[facility]2022-03-26 21:03:00





             Test Item    Value        Reference Range Interpretation Comments

 

             UA Comment 1 (test Suboptimal specimen                           



             code = UA Comment 1) received. Results may be                      

     



                          inaccurate due to the                           



                          age of the specimen.                           



                          Interpret results with                           



                          caution.                               



[facility] AND [id number] 21:03:00





             Test Item    Value        Reference Range Interpretation Comments

 

             UA Color (test code = Yellow *NA*(3/26/22                          

 



             UA Color)    4:03 PM)                               



[facility] AND [id number] 21:03:00





             Test Item    Value        Reference Range Interpretation Comments

 

             UA Turbidity (test code Slight *ABN*(3/26/22                       

    



             = UA Turbidity) 4:03 PM)                               



[facility] AND [id number] 21:03:00





             Test Item    Value        Reference Range Interpretation Comments

 

             UA Spec Grav (test code = UA Spec 1.015 1                          

      



             Grav)                                               



[facility] AND [id number] 21:03:00





             Test Item    Value        Reference Range Interpretation Comments

 

             UA pH (test code = UA pH) 5.0 1        5.0-8.0                   



[facility] AND [id number] 21:03:00





             Test Item    Value        Reference Range Interpretation Comments

 

             UA Protein (test code = UA Negative mg/dL                          

 



             Protein)                                            



[facility] AND [id number] 21:03:00





             Test Item    Value        Reference Range Interpretation Comments

 

             UA Glucose (test code = UA Negative mg/dL                          

 



             Glucose)                                            



[facility] AND [id number] 21:03:00





             Test Item    Value        Reference Range Interpretation Comments

 

             UA Ketones (test code = UA Negative mg/dL                          

 



             Ketones)                                            



[facility] AND [id number] 21:03:00





             Test Item    Value        Reference Range Interpretation Comments

 

             UA Bili (test code = Negative *NA*(3/26/22                         

  



             UA Bili)     4:03 PM)                               



[facility] AND [id number] 21:03:00





             Test Item    Value        Reference Range Interpretation Comments

 

             UA Blood (test code = Negative (3/26/22 4:03                       

    



             UA Blood)    PM)                                    



[facility] AND [id number] 21:03:00





             Test Item    Value        Reference Range Interpretation Comments

 

             UA Urobilinogen (test code = UA no gt        0.1-1.0               

    



             Urobilinogen)                                        



[facility] AND [id number] 21:03:00





             Test Item    Value        Reference Range Interpretation Comments

 

             UA Nitrite (test code Negative (3/26/22 4:03                       

    



             = UA Nitrite) PM)                                    



[facility] AND [id number] 21:03:00





             Test Item    Value        Reference Range Interpretation Comments

 

             UA Leuk Est (test code Large *ABN*(3/26/22                         

  



             = UA Leuk Est) 4:03 PM)                               



[facility] AND [id number] 21:03:00





             Test Item    Value        Reference Range Interpretation Comments

 

             UA WBC (test code = 64           See_Comment                [Automa

huma message] The



             UA WBC)                                             system which ge

nerated this



                                                                 result transmit

huma



                                                                 reference range

: <=5. The



                                                                 reference range

 was not



                                                                 used to interpr

et this



                                                                 result as zayda

l/abnormal.



[facility] AND [id number] 21:03:00





             Test Item    Value        Reference Range Interpretation Comments

 

             UA RBC (test code = 2            See_Comment                [Automa

huma message] The



             UA RBC)                                             system which ge

nerated this



                                                                 result transmit

huma



                                                                 reference range

: <=2. The



                                                                 reference range

 was not



                                                                 used to interpr

et this



                                                                 result as zayda

l/abnormal.



[facility] AND [id number] 21:03:00





             Test Item    Value        Reference Range Interpretation Comments

 

             UA Mucus (test code = UA Mucus) Few /LPF                           

    



[facility] AND [id number] 21:03:00





             Test Item    Value        Reference Range Interpretation Comments

 

             UA Sq Epi (test code = UA Sq Epi) None Seen                        

      



[facility] [id number] 09:58:00





             Test Item    Value        Reference Range Interpretation Comments

 

             Lactic Acid Lvl (test code = Lactic 2.4          0.5-2.2           

        



             Acid Lvl)                                           



[facility] H[id number] 09:58:00





             Test Item    Value        Reference Range Interpretation Comments

 

             Sed Rate (test code = 13           See_Comment                [Auto

mated message] The



             Sed Rate)                                           system which ge

nerated this



                                                                 result transmit

huma



                                                                 reference range

: <=15. The



                                                                 reference range

 was not



                                                                 used to interpr

et this



                                                                 result as zayda

l/abnormal.



[facility] H[id number] 09:58:00





             Test Item    Value        Reference Range Interpretation Comments

 

             C-REACTIVE PROTEIN (test code = 10.6                               

    



             C-REACTIVE PROTEIN)                                        



[facility] HospitalCHE[facility] [id number] 09:58:00





             Test Item    Value        Reference Range Interpretation Comments

 

             Lactic Acid Lvl (test code = Lactic 2.4          0.5-2.2           

        



             Acid Lvl)                                           



[facility] H[id number] 09:58:00





             Test Item    Value        Reference Range Interpretation Comments

 

             Sed Rate (test code = 13           See_Comment                [Auto

mated message] The



             Sed Rate)                                           system which ge

nerated this



                                                                 result transmit

huma



                                                                 reference range

: <=15. The



                                                                 reference range

 was not



                                                                 used to interpr

et this



                                                                 result as zayda

l/abnormal.



[address]03-26 09:58:00





             Test Item    Value        Reference Range Interpretation Comments

 

             C-REACTIVE PROTEIN (test code = 10.6                               

    



             C-REACTIVE PROTEIN)                                        



[address]03-26 09:58:00





             Test Item    Value        Reference Range Interpretation Comments

 

             Lactic Acid Lvl (test code = Lactic 2.4          0.5-2.2           

        



             Acid Lvl)                                           



[address]03-26 09:58:00





             Test Item    Value        Reference Range Interpretation Comments

 

             Sed Rate (test code = 13           See_Comment                [Auto

mated message] The



             Sed Rate)                                           system which ge

nerated this



                                                                 result transmit

huma



                                                                 reference range

: <=15. The



                                                                 reference range

 was not



                                                                 used to interpr

et this



                                                                 result as zayda

l/abnormal.



[address]03-26 09:58:00





             Test Item    Value        Reference Range Interpretation Comments

 

             C-REACTIVE PROTEIN (test code = 10.6                               

    



             C-REACTIVE PROTEIN)                                        



[address]03-26 09:58:00





             Test Item    Value        Reference Range Interpretation Comments

 

             Lactic Acid Lvl (test code = Lactic 2.4          0.5-2.2           

        



             Acid Lvl)                                           



[address] [address]03-26 09:58:00





             Test Item    Value        Reference Range Interpretation Comments

 

             Sed Rate (test code = 13           See_Comment                [Auto

mated message] The



             Sed Rate)                                           system which ge

nerated this



                                                                 result transmit

huma



                                                                 reference range

: <=15. The



                                                                 reference range

 was not



                                                                 used to interpr

et this



                                                                 result as zayda

l/abnormal.



[address]03-26 09:58:00





             Test Item    Value        Reference Range Interpretation Comments

 

             C-REACTIVE PROTEIN (test code = 10.6                               

    



             C-REACTIVE PROTEIN)                                        



[address]03-26 09:58:00





             Test Item    Value        Reference Range Interpretation Comments

 

             Lactic Acid Lvl (test code = Lactic 2.4          0.5-2.2           

        



             Acid Lvl)                                           



[address]03-26 09:58:00





             Test Item    Value        Reference Range Interpretation Comments

 

             Sed Rate (test code = 13           See_Comment                [Auto

mated message] The



             Sed Rate)                                           system which ge

nerated this



                                                                 result transmit

huma



                                                                 reference range

: <=15. The



                                                                 reference range

 was not



                                                                 used to interpr

et this



                                                                 result as zayda

l/abnormal.



[address]03-26 09:58:00





             Test Item    Value        Reference Range Interpretation Comments

 

             C-REACTIVE PROTEIN (test code = 10.6                               

    



             C-REACTIVE PROTEIN)                                        



[address]03-26 09:58:00





             Test Item    Value        Reference Range Interpretation Comments

 

             Lactic Acid Lvl (test code = Lactic 2.4          0.5-2.2           

        



             Acid Lvl)                                           



[address]03-26 09:58:00





             Test Item    Value        Reference Range Interpretation Comments

 

             Sed Rate (test code = 13           See_Comment                [Auto

mated message] The



             Sed Rate)                                           system which ge

nerated this



                                                                 result transmit

huma



                                                                 reference range

: <=15. The



                                                                 reference range

 was not



                                                                 used to interpr

et this



                                                                 result as zayda

l/abnormal.



[address]03-26 09:58:00





             Test Item    Value        Reference Range Interpretation Comments

 

             C-REACTIVE PROTEIN (test code = 10.6                               

    



             C-REACTIVE PROTEIN)                                        



[address]03-26 09:58:00





             Test Item    Value        Reference Range Interpretation Comments

 

             Lactic Acid Lvl (test code = Lactic 2.4          0.5-2.2           

        



             Acid Lvl)                                           



[address]03-26 09:58:00





             Test Item    Value        Reference Range Interpretation Comments

 

             Sed Rate (test code = 13           See_Comment                [Auto

mated message] The



             Sed Rate)                                           system which ge

nerated this



                                                                 result transmit

huma



                                                                 reference range

: <=15. The



                                                                 reference range

 was not



                                                                 used to interpr

et this



                                                                 result as zayda

l/abnormal.



[place] [address]-03-26 09:58:00





             Test Item    Value        Reference Range Interpretation Comments

 

             C-REACTIVE PROTEIN (test code = 10.6                               

    



             C-REACTIVE PROTEIN)                                        



[facility] [id number]36[facility]00





             Test Item    Value        Reference Range Interpretation Comments

 

             Protein CSF (test code = Protein CSF) 14           15-45           

          



[place] [id number] 18:36:00





             Test Item    Value        Reference Range Interpretation Comments

 

             Glucose CSF (test code = Glucose CSF) 67           45-80           

          



[place] [id number] 18:36:00





             Test Item    Value        Reference Range Interpretation Comments

 

             Tube Num CSF (test xxxxxxx (3/25/22 1:36                           



             code = Tube Num CSF) PM)                                    



[facility]2022-03-25 18:36:00





             Test Item    Value        Reference Range Interpretation Comments

 

             Color CSF (test code Colorless (3/25/22 1:36                       

    



             = Color CSF) PM)                                    



[facility]2022-03-25 18:36:00





             Test Item    Value        Reference Range Interpretation Comments

 

             Clarity CSF (test code = Clear (3/25/22 1:36                       

    



             Clarity CSF) PM)                                    



[facility]2022-03-25 18:36:00





             Test Item    Value        Reference Range Interpretation Comments

 

             Supernat CSF (test Colorless (3/25/22 1:36                         

  



             code = Supernat CSF) PM)                                    



[facility]2022-03-25 18:36:00





             Test Item    Value        Reference Range Interpretation Comments

 

             Nucleated Cells CSF 0            See_Comment                [Automa

huma message] The



             (test code = Nucleated                                        syste

m which generated



             Cells CSF)                                          this result tra

nsmitted



                                                                 reference range

: <=53.



                                                                 The reference r

zhen was



                                                                 not used to int

erpret



                                                                 this result as



                                                                 normal/abnormal

.



[facility]2022-03-25 18:36:00





             Test Item    Value        Reference Range Interpretation Comments

 

             RBC CSF (test code = 0            See_Comment                [Autom

ated message] The



             RBC CSF)                                            system which ge

nerated this



                                                                 result transmit

huma



                                                                 reference range

: <=03. The



                                                                 reference range

 was not



                                                                 used to interpr

et this



                                                                 result as zayda

l/abnormal.



[facility]2022-03-25 18:36:00





             Test Item    Value        Reference Range Interpretation Comments

 

             Comment CSF (test Differential not                           



             code = Comment CSF) performed on WBC count of                      

     



                          less than 5.                           



[id number]36:00





             Test Item    Value        Reference Range Interpretation Comments

 

             Gram Stain Report Gram Stain Performed By:                         

  



             (test code = Gram [facility] [place]                           



             Stain Report) [facility] [id number] 18:36:00





             Test Item    Value        Reference Range Interpretation Comments

 

             Culture: CSF w/Gram Stain (test No Growth                          

    



             code = Culture: CSF w/Gram Stain)                                  

      



[facility]2022-03-25 18:36:00





             Test Item    Value        Reference Range Interpretation Comments

 

             Protein CSF (test code = Protein CSF) 14           15-45           

          



[facility]2022-03-25 18:36:00





             Test Item    Value        Reference Range Interpretation Comments

 

             Glucose CSF (test code = Glucose CSF) 67           45-80           

          



[facility]2022-03-25 18:36:00





             Test Item    Value        Reference Range Interpretation Comments

 

             Tube Num CSF (test xxxxxxx (3/25/22 1:36                           



             code = Tube Num CSF) PM)                                    



[facility]2022-03-25 18:36:00





             Test Item    Value        Reference Range Interpretation Comments

 

             Color CSF (test code Colorless (3/25/22 1:36                       

    



             = Color CSF) PM)                                    



[facility] [id number] 18:36:00





             Test Item    Value        Reference Range Interpretation Comments

 

             Clarity CSF (test code = Clear (3/25/22 1:36                       

    



             Clarity CSF) PM)                                    



[facility]2022-03-25 18:36:00





             Test Item    Value        Reference Range Interpretation Comments

 

             Supernat CSF (test Colorless (3/25/22 1:36                         

  



             code = Supernat CSF) PM)                                    



[facility]2022-03-25 18:36:00





             Test Item    Value        Reference Range Interpretation Comments

 

             Nucleated Cells CSF 0            See_Comment                [Automa

huma message] The



             (test code = Nucleated                                        syste

m which generated



             Cells CSF)                                          this result tra

nsmitted



                                                                 reference range

: <=53.



                                                                 The reference r

zhen was



                                                                 not used to int

erpret



                                                                 this result as



                                                                 normal/abnormal

.



[facility]2022-03-25 18:36:00





             Test Item    Value        Reference Range Interpretation Comments

 

             RBC CSF (test code = 0            See_Comment                [Autom

ated message] The



             RBC CSF)                                            system which ge

nerated this



                                                                 result transmit

huma



                                                                 reference range

: <=03. The



                                                                 reference range

 was not



                                                                 used to interpr

et this



                                                                 result as zayda

l/abnormal.



[facility] [id number] 18:36:00





             Test Item    Value        Reference Range Interpretation Comments

 

             Comment CSF (test Differential not                           



             code = Comment CSF) performed on WBC count of                      

     



                          less than 5.                           



[facility] [id number] 18:36:00





             Test Item    Value        Reference Range Interpretation Comments

 

             Gram Stain Report Gram Stain Performed By:                         

  



             (test code = Gram [facility] [place]                           



             Stain Report) [facility] w/Gram [id number] 18:36:00





             Test Item    Value        Reference Range Interpretation Comments

 

             Culture: CSF w/Gram Stain (test No Growth                          

    



             code = Culture: CSF w/Gram Stain)                                  

      



[facility]2022-03-25 18:36:00





             Test Item    Value        Reference Range Interpretation Comments

 

             Protein CSF (test code = Protein CSF) 14           15-45           

          



[facility]2022-03-25 18:36:00





             Test Item    Value        Reference Range Interpretation Comments

 

             Glucose CSF (test code = Glucose CSF) 67           45-80           

          



[facility]2022-03-25 18:36:00





             Test Item    Value        Reference Range Interpretation Comments

 

             Tube Num CSF (test xxxxxxx (3/25/22 1:36                           



             code = Tube Num CSF) PM)                                    



[facility]2022-03-25 18:36:00





             Test Item    Value        Reference Range Interpretation Comments

 

             Color CSF (test code Colorless (3/25/22 1:36                       

    



             = Color CSF) PM)                                    



[facility]2022-03-25 18:36:00





             Test Item    Value        Reference Range Interpretation Comments

 

             Clarity CSF (test code = Clear (3/25/22 1:36                       

    



             Clarity CSF) PM)                                    



[facility]2022-03-25 18:36:00





             Test Item    Value        Reference Range Interpretation Comments

 

             Supernat CSF (test Colorless (3/25/22 1:36                         

  



             code = Supernat CSF) PM)                                    



[facility]2022-03-25 18:36:00





             Test Item    Value        Reference Range Interpretation Comments

 

             Nucleated Cells CSF 0            See_Comment                [Automa

huma message] The



             (test code = Nucleated                                        syste

m which generated



             Cells CSF)                                          this result tra

nsmitted



                                                                 reference range

: <=53.



                                                                 The reference r

zhen was



                                                                 not used to int

erpret



                                                                 this result as



                                                                 normal/abnormal

.



[facility]2022-03-25 18:36:00





             Test Item    Value        Reference Range Interpretation Comments

 

             RBC CSF (test code = 0            See_Comment                [Autom

ated message] The



             RBC CSF)                                            system which ge

nerated this



                                                                 result transmit

huma



                                                                 reference range

: <=03. The



                                                                 reference range

 was not



                                                                 used to interpr

et this



                                                                 result as zayda

l/abnormal.



[facility]2022-03-25 18:36:00





             Test Item    Value        Reference Range Interpretation Comments

 

             Comment CSF (test Differential not                           



             code = Comment CSF) performed on WBC count of                      

     



                          less than 5.                           



[facility] [id number] 18:36:00





             Test Item    Value        Reference Range Interpretation Comments

 

             Gram Stain Report Gram Stain Performed By:                         

  



             (test code = Gram [facility]                           



             Stain Report) [facility] w/Gram [id number] 18:36:00





             Test Item    Value        Reference Range Interpretation Comments

 

             Culture: CSF w/Gram Stain (test No Growth                          

    



             code = Culture: CSF w/Gram Stain)                                  

      



[facility] [id number] 18:36:00





             Test Item    Value        Reference Range Interpretation Comments

 

             Protein CSF (test code = Protein CSF) 14           15-45           

          



[facility] [id number] 18:36:00





             Test Item    Value        Reference Range Interpretation Comments

 

             Glucose CSF (test code = Glucose CSF) 67           45-80           

          



[facility]2022-03-25 18:36:00





             Test Item    Value        Reference Range Interpretation Comments

 

             Tube Num CSF (test xxxxxxx (3/25/22 1:36                           



             code = Tube Num CSF) PM)                                    



[facility]2022-03-25 18:36:00





             Test Item    Value        Reference Range Interpretation Comments

 

             Color CSF (test code Colorless (3/25/22 1:36                       

    



             = Color CSF) PM)                                    



[facility]2022-03-25 18:36:00





             Test Item    Value        Reference Range Interpretation Comments

 

             Clarity CSF (test code = Clear (3/25/22 1:36                       

    



             Clarity CSF) PM)                                    



[facility]2022-03-25 18:36:00





             Test Item    Value        Reference Range Interpretation Comments

 

             Supernat CSF (test Colorless (3/25/22 1:36                         

  



             code = Supernat CSF) PM)                                    



[facility]2022-03-25 18:36:00





             Test Item    Value        Reference Range Interpretation Comments

 

             Nucleated Cells CSF 0            See_Comment                [Automa

huma message] The



             (test code = Nucleated                                        syste

m which generated



             Cells CSF)                                          this result tra

nsmitted



                                                                 reference range

: <=53.



                                                                 The reference r

zhen was



                                                                 not used to int

erpret



                                                                 this result as



                                                                 normal/abnormal

.



[facility]2022-03-25 18:36:00





             Test Item    Value        Reference Range Interpretation Comments

 

             RBC CSF (test code = 0            See_Comment                [Autom

ated message] The



             RBC CSF)                                            system which ge

nerated this



                                                                 result transmit

huma



                                                                 reference range

: <=03. The



                                                                 reference range

 was not



                                                                 used to interpr

et this



                                                                 result as zayda

l/abnormal.



[facility]2022-03-25 18:36:00





             Test Item    Value        Reference Range Interpretation Comments

 

             Comment CSF (test Differential not                           



             code = Comment CSF) performed on WBC count of                      

     



                          less than 5.                           



[facility] [id number] 18:36:00





             Test Item    Value        Reference Range Interpretation Comments

 

             Gram Stain Report Gram Stain Performed By:                         

  



             (test code = Gram [facility]                           



             Stain Report) [facility] w/Gram [id number] 18:36:00





             Test Item    Value        Reference Range Interpretation Comments

 

             Culture: CSF w/Gram Stain (test No Growth                          

    



             code = Culture: CSF w/Gram Stain)                                  

      



[facility] [id number] 18:36:00





             Test Item    Value        Reference Range Interpretation Comments

 

             Protein CSF (test code = Protein CSF) 14           15-45           

          



[facility]2022-03-25 18:36:00





             Test Item    Value        Reference Range Interpretation Comments

 

             Glucose CSF (test code = Glucose CSF) 67           45-80           

          



[facility]2022-03-25 18:36:00





             Test Item    Value        Reference Range Interpretation Comments

 

             Tube Num CSF (test xxxxxxx (3/25/22 1:36                           



             code = Tube Num CSF) PM)                                    



[facility]2022-03-25 18:36:00





             Test Item    Value        Reference Range Interpretation Comments

 

             Color CSF (test code Colorless (3/25/22 1:36                       

    



             = Color CSF) PM)                                    



[facility]2022-03-25 18:36:00





             Test Item    Value        Reference Range Interpretation Comments

 

             Clarity CSF (test code = Clear (3/25/22 1:36                       

    



             Clarity CSF) PM)                                    



[facility]2022-03-25 18:36:00





             Test Item    Value        Reference Range Interpretation Comments

 

             Supernat CSF (test Colorless (3/25/22 1:36                         

  



             code = Supernat CSF) PM)                                    



[facility]2022-03-25 18:36:00





             Test Item    Value        Reference Range Interpretation Comments

 

             Nucleated Cells CSF 0            See_Comment                [Automa

huma message] The



             (test code = Nucleated                                        syste

m which generated



             Cells CSF)                                          this result tra

nsmitted



                                                                 reference range

: <=53.



                                                                 The reference r

zhen was



                                                                 not used to int

erpret



                                                                 this result as



                                                                 normal/abnormal

.



[facility]2022-03-25 18:36:00





             Test Item    Value        Reference Range Interpretation Comments

 

             RBC CSF (test code = 0            See_Comment                [Autom

ated message] The



             RBC CSF)                                            system which ge

nerated this



                                                                 result transmit

huma



                                                                 reference range

: <=03. The



                                                                 reference range

 was not



                                                                 used to interpr

et this



                                                                 result as zayda

l/abnormal.



[facility] [id number] 18:36:00





             Test Item    Value        Reference Range Interpretation Comments

 

             Comment CSF (test Differential not                           



             code = Comment CSF) performed on WBC count of                      

     



                          less than 5.                           



[facility] [id number]





             Test Item    Value        Reference Range Interpretation Comments

 

             Gram Stain Report Gram Stain Performed By:                         

  



             (test code = Gram [facility]                           



             St[facility] Report) [facility] [id number] 18:36:00





             Test Item    Value        Reference Range Interpretation Comments

 

             Culture: CSF w/Gram Stain (test No Growth                          

    



             code = Culture: CSF w/Gram Stain)                                  

      



[facility]2022-03-25 18:36:00





             Test Item    Value        Reference Range Interpretation Comments

 

             Protein CSF (test code = Protein CSF) 14           15-45           

          



[facility]2022-03-25 18:36:00





             Test Item    Value        Reference Range Interpretation Comments

 

             Glucose CSF (test code = Glucose CSF) 67           45-80           

          



[facility]2022-03-25 18:36:00





             Test Item    Value        Reference Range Interpretation Comments

 

             Tube Num CSF (test xxxxxxx (3/25/22 1:36                           



             code = Tube Num CSF) PM)                                    



[facility]2022-03-25 18:36:00





             Test Item    Value        Reference Range Interpretation Comments

 

             Color CSF (test code Colorless (3/25/22 1:36                       

    



             = Color CSF) PM)                                    



[facility]2022-03-25 18:36:00





             Test Item    Value        Reference Range Interpretation Comments

 

             Clarity CSF (test code = Clear (3/25/22 1:36                       

    



             Clarity CSF) PM)                                    



[facility]2022-03-25 18:36:00





             Test Item    Value        Reference Range Interpretation Comments

 

             Supernat CSF (test Colorless (3/25/22 1:36                         

  



             code = Supernat CSF) PM)                                    



[facility]2022-03-25 18:36:00





             Test Item    Value        Reference Range Interpretation Comments

 

             Nucleated Cells CSF 0            See_Comment                [Automa

huma message] The



             (test code = Nucleated                                        syste

m which generated



             Cells CSF)                                          this result tra

nsmitted



                                                                 reference range

: <=53.



                                                                 The reference r

zhen was



                                                                 not used to int

erpret



                                                                 this result as



                                                                 normal/abnormal

.



[facility]2022-03-25 18:36:00





             Test Item    Value        Reference Range Interpretation Comments

 

             RBC CSF (test code = 0            See_Comment                [Autom

ated message] The



             RBC CSF)                                            system which ge

nerated this



                                                                 result transmit

huma



                                                                 reference range

: <=03. The



                                                                 reference range

 was not



                                                                 used to interpr

et this



                                                                 result as zayda

l/abnormal.



[facility]2022-03-25 18:36:00





             Test Item    Value        Reference Range Interpretation Comments

 

             Comment CSF (test Differential not                           



             code = Comment CSF) performed on WBC count of                      

     



                          less than 5.                           



[facility] [id number] 18:36:00





             Test Item    Value        Reference Range Interpretation Comments

 

             Gram Stain Report Gram Stain Performed By:                         

  



             (test code = Gram [facility]                           



             Stain Report) [facility] w/Gram [id number] 18:36:00





             Test Item    Value        Reference Range Interpretation Comments

 

             Culture: CSF w/Gram Stain (test No Growth                          

    



             code = Culture: CSF w/Gram Stain)                                  

      



[facility]2022-03-25 18:36:00





             Test Item    Value        Reference Range Interpretation Comments

 

             Protein CSF (test code = Protein CSF) 14           15-45           

          



[facility]2022-03-25 18:36:00





             Test Item    Value        Reference Range Interpretation Comments

 

             Glucose CSF (test code = Glucose CSF) 67           45-80           

          



[facility]2022-03-25 18:36:00





             Test Item    Value        Reference Range Interpretation Comments

 

             Tube Num CSF (test xxxxxxx (3/25/22 1:36                           



             code = Tube Num CSF) PM)                                    



[facility]2022-03-25 18:36:00





             Test Item    Value        Reference Range Interpretation Comments

 

             Color CSF (test code Colorless (3/25/22 1:36                       

    



             = Color CSF) PM)                                    



[facility]2022-03-25 18:36:00





             Test Item    Value        Reference Range Interpretation Comments

 

             Clarity CSF (test code = Clear (3/25/22 1:36                       

    



             Clarity CSF) PM)                                    



[facility]2022-03-25 18:36:00





             Test Item    Value        Reference Range Interpretation Comments

 

             Supernat CSF (test Colorless (3/25/22 1:36                         

  



             code = Supernat CSF) PM)                                    



[facility]2022-03-25 18:36:00





             Test Item    Value        Reference Range Interpretation Comments

 

             Nucleated Cells CSF 0            See_Comment                [Automa

huma message] The



             (test code = Nucleated                                        syste

m which generated



             Cells CSF)                                          this result tra

nsmitted



                                                                 reference range

: <=53.



                                                                 The reference r

zhen was



                                                                 not used to int

erpret



                                                                 this result as



                                                                 normal/abnormal

.



[facility] [id number] 18:36:00





             Test Item    Value        Reference Range Interpretation Comments

 

             RBC CSF (test code = 0            See_Comment                [Autom

ated message] The



             RBC CSF)                                            system which ge

nerated this



                                                                 result transmit

huma



                                                                 reference range

: <=03. The



                                                                 reference range

 was not



                                                                 used to interpr

et this



                                                                 result as zayda

l/abnormal.



[facility] [id number] 18:36:00





             Test Item    Value        Reference Range Interpretation Comments

 

             Comment CSF (test Differential not                           



             code = Comment CSF) performed on WBC count of                      

     



                          less than 5.                           



[facility] [id number] 18:36:00





             Test Item    Value        Reference Range Interpretation Comments

 

             Gram Stain Report Gram Stain Performed By:                         

  



             (test code = Gram [facility]                           



             Stain Report) [facility] w/Gram [id number] 18:36:00





             Test Item    Value        Reference Range Interpretation Comments

 

             Culture: CSF w/Gram Stain (test No Growth                          

    



             code = Culture: CSF w/Gram Stain)                                  

      



[facility] H[id number] 08:28:00





             Test Item    Value        Reference Range Interpretation Comments

 

             Coronavirus (COVID-19) Not Detected (3/25/22                       

    



             ZEYAD (test code = 3:28 AM)                               



             Coronavirus (COVID-19)                                        



             ZEYAD)                                                



[place] [id number] 08:28:00





             Test Item    Value        Reference Range Interpretation Comments

 

             Coronavirus (COVID-19) Not Detected (3/25/22                       

    



             ZEYAD (test code = 3:28 AM)                               



             Coronavirus (COVID-19)                                        



             ZEYAD)                                                



[facility] HospitalViosmsxUEWESKNOOZ2287-75-57 08:28:00





             Test Item    Value        Reference Range Interpretation Comments

 

             Coronavirus (COVID-19) Not Detected (3/25/22                       

    



             ZEYAD (test code = 3:28 AM)                               



             Coronavirus (COVID-19)                                        



             ZEYAD)                                                



[facility] H[id number] 08:28:00





             Test Item    Value        Reference Range Interpretation Comments

 

             Coronavirus (COVID-19) Not Detected (3/25/22                       

    



             ZEYAD (test code = 3:28 AM)                               



             Coronavirus (COVID-19)                                        



             ZEYAD)                                                



[facility]ijiqqMJUSLXUMKZ3312-41-04 08:28:00





             Test Item    Value        Reference Range Interpretation Comments

 

             Coronavirus (COVID-19) Not Detected (3/25/22                       

    



             ZEYAD (test code = 3:28 AM)                               



             Coronavirus (COVID-19)                                        



             ZEYAD)                                                



[facility] CenterWshmqunKTNRHUCNGF8609-06-79 08:28:00





             Test Item    Value        Reference Range Interpretation Comments

 

             Coronavirus (COVID-19) Not Detected (3/25/22                       

    



             ZEYAD (test code = 3:28 AM)                               



             Coronavirus (COVID-19)                                        



             ZEYAD)                                                



[facility] Cent[id number] 08:28:00





             Test Item    Value        Reference Range Interpretation Comments

 

             Coronavirus (COVID-19) Not Detected (3/25/22                       

    



             ZEYAD (test code = 3:28 AM)                               



             Coronavirus (COVID-19)                                        



             ZEYAD)                                                



[facility] [id number] 06:48:00





             Test Item    Value        Reference Range Interpretation Comments

 

             Antibody Scrn (test Negative (3/25/22 1:48                         

  



             code = Antibody Scrn) AM)                                    



[facility] [id number] 06:48:00





             Test Item    Value        Reference Range Interpretation Comments

 

             ABO/Rh (test code = ABO/Rh) AB POS                                 



[facility][id number] [facility]:48:00





             Test Item    Value        Reference Range Interpretation Comments

 

             Segs (test code = Segs) 70.8         45.0-75.0                 



[facility] DallasPlzzyeaGKUKRMGOSF9075-03-95 06:48:00





             Test Item    Value        Reference Range Interpretation Comments

 

             Lymphocytes (test code = Lymphocytes) 20.8         20.0-40.0       

          



[facility] DallasUtlqjpyHMUBJSFEAK8720-99-39 06:48:00





             Test Item    Value        Reference Range Interpretation Comments

 

             Monocytes (test code = Monocytes) 5.8          2.0-12.0            

      



[facility] DallasAlvhkguXBZFNNJCKM3348-74-49 06:48:00





             Test Item    Value        Reference Range Interpretation Comments

 

             Eosinophils (test code = 2.3          See_Comment                [A

utomated message] The



             Eosinophils)                                        system which ge

nerated



                                                                 this result tra

nsmitted



                                                                 reference range

: <=4.0.



                                                                 The reference r

zhen was



                                                                 not used to int

erpret



                                                                 this result as



                                                                 normal/abnormal

.



[facility] [id number] 06:48:00





             Test Item    Value        Reference Range Interpretation Comments

 

             Basophils (test code = 0.3          See_Comment                [Aut

omated message] The



             Basophils)                                          system which ge

nerated



                                                                 this result tra

nsmitted



                                                                 reference range

: <=1.0.



                                                                 The reference r

zhen was



                                                                 not used to int

erpret



                                                                 this result as



                                                                 normal/abnormal

.



[facility] DallasKrngarvIYQURWEXJA1032-10-66 06:48:00





             Test Item    Value        Reference Range Interpretation Comments

 

             Neutrophils # (test code = Neutrophils 8.4          1.5-8.1        

           



             #)                                                  



[facility] DallasQzwxusmWZAIBIWFFY6434-73-90 06:48:00





             Test Item    Value        Reference Range Interpretation Comments

 

             Lymphocytes # (test code = Lymphocytes 2.5          1.0-5.5        

           



             #)                                                  



[place] [id number] 06:48:00





             Test Item    Value        Reference Range Interpretation Comments

 

             Monocytes # (test code 0.7          See_Comment                [Aut

omated message] The



             = Monocytes #)                                        system which 

generated



                                                                 this result tra

nsmitted



                                                                 reference range

: <=0.8.



                                                                 The reference r

zhen was



                                                                 not used to int

erpret



                                                                 this result as



                                                                 normal/abnormal

.



[facility] DallasChvvoqeTZSGBLKBSW3739-45-19 06:48:00





             Test Item    Value        Reference Range Interpretation Comments

 

             Eosinophils # (test code 0.3          See_Comment                [A

utomated message] The



             = Eosinophils #)                                        system whic

h generated



                                                                 this result tra

nsmitted



                                                                 reference range

: <=0.5.



                                                                 The reference r

hzen was



                                                                 not used to int

erpret



                                                                 this result as



                                                                 normal/abnormal

.



[facility] DallasKrgedicVFAFYZHAUO1391-63-08 06:48:00





             Test Item    Value        Reference Range Interpretation Comments

 

             WBC X 10x3 (test code = WBC X 10x3) 11.9         3.7-10.4          

        



[place] [id number] 06:48:00





             Test Item    Value        Reference Range Interpretation Comments

 

             RBC X 10x6 (test code = RBC X 10x6) 5.95         4.70-6.10         

        



[place] [id number] 06:48:00





             Test Item    Value        Reference Range Interpretation Comments

 

             Hgb (test code = Hgb) 17.9         14.0-18.0                 



[place] 077282-03-25 06:48:00





             Test Item    Value        Reference Range Interpretation Comments

 

             Hct (test code = Hct) 52.0         42.0-54.0                 



[place] 077282-03-25 06:48:00





             Test Item    Value        Reference Range Interpretation Comments

 

             MCV (test code = MCV) 87.5         80.0-94.0                 



[facility] HospitalXsyuqjvWAOTYXZFQR2614-52-10 06:48:00





             Test Item    Value        Reference Range Interpretation Comments

 

             MCH (test code = MCH) 30.2 pg      27.0-31.0                 



[facility] HospitalAnqodwwZTLQCDPPNZ3833-31-28 06:48:00





             Test Item    Value        Reference Range Interpretation Comments

 

             MCHC (test code = MCHC) 34.5         32.0-36.0                 



[facility] HospitalOqktpzePJDCHZUAEU4280-14-60 06:48:00





             Test Item    Value        Reference Range Interpretation Comments

 

             RDW (test code = RDW) 15.4         11.5-14.5                 



[facility] HospitalHbxhuhwNFWCARMFAE8629-12-35 06:48:00





             Test Item    Value        Reference Range Interpretation Comments

 

             Platelet (test code = Platelet) 414          133-450               

    



[facility] HospitalZprnuwaCWCLQQLZWQ4987-08-65 06:48:00





             Test Item    Value        Reference Range Interpretation Comments

 

             MPV (test code = MPV) 8.5          7.4-10.4                  



[facility] HospitalJtcllpwICPTJEIATV3316-10-83 06:48:00





             Test Item    Value        Reference Range Interpretation Comments

 

             PT (test code = PT) 12.9 s       12.0-14.7                 



[facility] HospitalRsscdemTHFDNKKFCI8165-55-61 06:48:00





             Test Item    Value        Reference Range Interpretation Comments

 

             INR (test code = INR) 0.98 1       0.85-1.17                 



[facility] HospitalTbbzcsmFJDNZIZJWU1674-74-38 06:48:00





             Test Item    Value        Reference Range Interpretation Comments

 

             PTT (test code = PTT) 31.4 s       22.9-35.8                 



[facility] [employer] [id number] 06:48:00





             Test Item    Value        Reference Range Interpretation Comments

 

             Antibody Scrn (test Negative (3/25/22 1:48                         

  



             code = Antibody Scrn) AM)                                    



[facility] [employer] [id number] 06:48:00





             Test Item    Value        Reference Range Interpretation Comments

 

             ABO/Rh (test code = ABO/Rh) AB POS                                 



[facility] HospitalVcxvzncIUIISHPKBU9851-32-39 06:48:00





             Test Item    Value        Reference Range Interpretation Comments

 

             Segs (test code = Segs) 70.8         45.0-75.0                 



[facility] [facility]9447[facility]37-61 [facility]:48:00





             Test Item    Value        Reference Range Interpretation Comments

 

             Lymphocytes (test code = Lymphocytes) 20.8         20.0-40.0       

          



[place] [address]-03-25 06:48:00





             Test Item    Value        Reference Range Interpretation Comments

 

             Monocytes (test code = Monocytes) 5.8          2.0-12.0            

      



[place] [address]-03-25 06:48:00





             Test Item    Value        Reference Range Interpretation Comments

 

             Eosinophils (test code = 2.3          See_Comment                [A

utomated message] The



             Eosinophils)                                        system which ge

nerated



                                                                 this result tra

nsmitted



                                                                 reference range

: <=4.0.



                                                                 The reference r

zhen was



                                                                 not used to int

erpret



                                                                 this result as



                                                                 normal/abnormal

.



[place] [address]-03-25 06:48:00





             Test Item    Value        Reference Range Interpretation Comments

 

             Basophils (test code = 0.3          See_Comment                [Aut

omated message] The



             Basophils)                                          system which ge

nerated



                                                                 this result tra

nsmitted



                                                                 reference range

: <=1.0.



                                                                 The reference r

zhen was



                                                                 not used to int

erpret



                                                                 this result as



                                                                 normal/abnormal

.



[place] 077282-03-25 06:48:00





             Test Item    Value        Reference Range Interpretation Comments

 

             Neutrophils # (test code = Neutrophils 8.4          1.5-8.1        

           



             #)                                                  



[place] [address]-03-25 06:48:00





             Test Item    Value        Reference Range Interpretation Comments

 

             Lymphocytes # (test code = Lymphocytes 2.5          1.0-5.5        

           



             #)                                                  



[place] [address]-03-25 06:48:00





             Test Item    Value        Reference Range Interpretation Comments

 

             Monocytes # (test code 0.7          See_Comment                [Aut

omated message] The



             = Monocytes #)                                        system which 

generated



                                                                 this result tra

nsmitted



                                                                 reference range

: <=0.8.



                                                                 The reference r

zhen was



                                                                 not used to int

erpret



                                                                 this result as



                                                                 normal/abnormal

.



[place] [address]-03-25 06:48:00





             Test Item    Value        Reference Range Interpretation Comments

 

             Eosinophils # (test code 0.3          See_Comment                [A

utomated message] The



             = Eosinophils #)                                        system whic

h generated



                                                                 this result tra

nsmitted



                                                                 reference range

: <=0.5.



                                                                 The reference r

zhen was



                                                                 not used to int

erpret



                                                                 this result as



                                                                 normal/abnormal

.



[place] 077282-03-25 06:48:00





             Test Item    Value        Reference Range Interpretation Comments

 

             WBC X 10x3 (test code = WBC X 10x3) 11.9         3.7-10.4          

        



[facility]USAART[facility]51[facility]





             Test Item    Value        Reference Range Interpretation Comments

 

             RBC X 10x6 (test code = RBC X 10x6) 5.95         4.70-6.10         

        



[facility] DallasNaonepbWCZFBIXIIV3318-57-84 06:48:00





             Test Item    Value        Reference Range Interpretation Comments

 

             Hgb (test code = Hgb) 17.9         14.0-18.0                 



[facility] DallasLedrytiBAMEYQHQTP1588-92-50 06:48:00





             Test Item    Value        Reference Range Interpretation Comments

 

             Hct (test code = Hct) 52.0         42.0-54.0                 



[facility] DallasFewcfjzDBQFZSCUFE3488-12-08 06:48:00





             Test Item    Value        Reference Range Interpretation Comments

 

             MCV (test code = MCV) 87.5         80.0-94.0                 



[facility] DallasZjrgwpcMUOETQEWQN6410-66-74 06:48:00





             Test Item    Value        Reference Range Interpretation Comments

 

             MCH (test code = MCH) 30.2 pg      27.0-31.0                 



[facility] DallasElijkypOMIMHQUNNQ6054-85-29 06:48:00





             Test Item    Value        Reference Range Interpretation Comments

 

             MCHC (test code = MCHC) 34.5         32.0-36.0                 



[facility] DallasSsgfxqsUXONBQQCKT4889-65-61 06:48:00





             Test Item    Value        Reference Range Interpretation Comments

 

             RDW (test code = RDW) 15.4         11.5-14.5                 



[facility] DallasPqcycqiJRIOLHNCQD6161-22-75 06:48:00





             Test Item    Value        Reference Range Interpretation Comments

 

             Platelet (test code = Platelet) 414          133-450               

    



[facility] DallasSryrbtlLYITWYNIHU6481-39-27 06:48:00





             Test Item    Value        Reference Range Interpretation Comments

 

             MPV (test code = MPV) 8.5          7.4-10.4                  



[facility] DallasLefeouhFCEBXTWRFD4098-14-08 06:48:00





             Test Item    Value        Reference Range Interpretation Comments

 

             PT (test code = PT) 12.9 s       12.0-14.7                 



[place] 077282-03-25 06:48:00





             Test Item    Value        Reference Range Interpretation Comments

 

             INR (test code = INR) 0.98 1       0.85-1.17                 



[facility] DallasQynaudoHQSYRKLETV3629-03-43 06:48:00





             Test Item    Value        Reference Range Interpretation Comments

 

             PTT (test code = PTT) 31.4 s       22.9-35.8                 



[facility] EHCXQMM9754-42-93 06:48:00





             Test Item    Value        Reference Range Interpretation Comments

 

             Antibody Scrn (test Negative (3/25/22 1:48                         

  



             code = Antibody Scrn) AM)                                    



[facility] ZUBMEPO6898-10-91 06:48:00





             Test Item    Value        Reference Range Interpretation Comments

 

             ABO/Rh (test code = ABO/Rh) AB POS                                 



[facility] DallasFjjnmhyLZOLYYKHPV6421-14-14 06:48:00





             Test Item    Value        Reference Range Interpretation Comments

 

             Segs (test code = Segs) 70.8         45.0-75.0                 



[facility] DallasZkjntykMXIOCRHKLT0837-49-51 06:48:00





             Test Item    Value        Reference Range Interpretation Comments

 

             Lymphocytes (test code = Lymphocytes) 20.8         20.0-40.0       

          



[facility] DallasXgtsuvsTZFFQCTSQZ8258-40-28 06:48:00





             Test Item    Value        Reference Range Interpretation Comments

 

             Monocytes (test code = Monocytes) 5.8          2.0-12.0            

      



[facility] DallasYcqnycgQJAFPBZWOP1849-66-27 06:48:00





             Test Item    Value        Reference Range Interpretation Comments

 

             Eosinophils (test code = 2.3          See_Comment                [A

utomated message] The



             Eosinophils)                                        system which ge

nerated



                                                                 this result tra

nsmitted



                                                                 reference range

: <=4.0.



                                                                 The reference r

zhen was



                                                                 not used to int

erpret



                                                                 this result as



                                                                 normal/abnormal

.



[facility] DallasQqwupqgNPBDNBXYXM7527-10-01 06:48:00





             Test Item    Value        Reference Range Interpretation Comments

 

             Basophils (test code = 0.3          See_Comment                [Aut

omated message] The



             Basophils)                                          system which ge

nerated



                                                                 this result tra

nsmitted



                                                                 reference range

: <=1.0.



                                                                 The reference r

zhen was



                                                                 not used to int

erpret



                                                                 this result as



                                                                 normal/abnormal

.



[facility] DallasBnvnxnzHYARNEWSUB0643-78-17 06:48:00





             Test Item    Value        Reference Range Interpretation Comments

 

             Neutrophils # (test code = Neutrophils 8.4          1.5-8.1        

           



             #)                                                  



[facility] DallasHqbpgfaKCBAKTWLEA8999-19-93 06:48:00





             Test Item    Value        Reference Range Interpretation Comments

 

             Lymphocytes # (test code = Lymphocytes 2.5          1.0-5.5        

           



             #)                                                  



[facility] DallasKjgexhxHDGYFOFCGV0129-38-11 06:48:00





             Test Item    Value        Reference Range Interpretation Comments

 

             Monocytes # (test code 0.7          See_Comment                [Aut

omated message] The



             = Monocytes #)                                        system which 

generated



                                                                 this result tra

nsmitted



                                                                 reference range

: <=0.8.



                                                                 The reference r

zhen was



                                                                 not used to int

erpret



                                                                 this result as



                                                                 normal/abnormal

.



[facility] DallasKslhmkfDUUBKNSTTO3347-53-88 06:48:00





             Test Item    Value        Reference Range Interpretation Comments

 

             Eosinophils # (test code 0.3          See_Comment                [A

utomated message] The



             = Eosinophils #)                                        system whic

h generated



                                                                 this result tra

nsmitted



                                                                 reference range

: <=0.5.



                                                                 The reference r

zhen was



                                                                 not used to int

erpret



                                                                 this result as



                                                                 normal/abnormal

.



[facility] DallasCoienfeWOFXIXRAOR5832-79-19 06:48:00





             Test Item    Value        Reference Range Interpretation Comments

 

             WBC X 10x3 (test code = WBC X 10x3) 11.9         3.7-10.4          

        



[facility] DallasXcfsymbURXVGVJMZI5598-30-33 06:48:00





             Test Item    Value        Reference Range Interpretation Comments

 

             RBC X 10x6 (test code = RBC X 10x6) 5.95         4.70-6.10         

        



[facility] DallasFkhmudlNDUFVLQNHF0721-35-29 06:48:00





             Test Item    Value        Reference Range Interpretation Comments

 

             Hgb (test code = Hgb) 17.9         14.0-18.0                 



[facility] DallasUumvugrEVPIZRRCFY1060-81-57 06:48:00





             Test Item    Value        Reference Range Interpretation Comments

 

             Hct (test code = Hct) 52.0         42.0-54.0                 



[facility] DallasLebcwlmDDCZPBSTSD5683-52-91 06:48:00





             Test Item    Value        Reference Range Interpretation Comments

 

             MCV (test code = MCV) 87.5         80.0-94.0                 



[facility] DallasTskbbmqUDSQCXCZID9875-20-39 06:48:00





             Test Item    Value        Reference Range Interpretation Comments

 

             MCH (test code = MCH) 30.2 pg      27.0-31.0                 



[facility] DallasOryyrvgJSDHKJKHQF8190-85-97 06:48:00





             Test Item    Value        Reference Range Interpretation Comments

 

             MCHC (test code = MCHC) 34.5         32.0-36.0                 



[facility] DallasCuqonanOBDJXRFIOP7830-21-74 06:48:00





             Test Item    Value        Reference Range Interpretation Comments

 

             RDW (test code = RDW) 15.4         11.5-14.5                 



[facility] DallasHrgtyfzFZACTYFDCQ9516-69-56 06:48:00





             Test Item    Value        Reference Range Interpretation Comments

 

             Platelet (test code = Platelet) 414          133-450               

    



[facility] DallasOsztvctVVVOKZWYBN1800-04-21 06:48:00





             Test Item    Value        Reference Range Interpretation Comments

 

             MPV (test code = MPV) 8.5          7.4-10.4                  



[facility] DallasPkdfxucWJYGPVGOLZ3319-46-21 06:48:00





             Test Item    Value        Reference Range Interpretation Comments

 

             PT (test code = PT) 12.9 s       12.0-14.7                 



[facility] DallasJupyhvlMAJOZZRHAE8402-02-46 06:48:00





             Test Item    Value        Reference Range Interpretation Comments

 

             INR (test code = INR) 0.98 1       0.85-1.17                 



[facility] DallasUysyjsaSBGHAHYAIC1964-70-57 06:48:00





             Test Item    Value        Reference Range Interpretation Comments

 

             PTT (test code = PTT) 31.4 s       22.9-35.8                 



[facility]2022-03-25 06:48:00





             Test Item    Value        Reference Range Interpretation Comments

 

             Antibody Scrn (test Negative (3/25/22 1:48                         

  



             code = Antibody Scrn) AM)                                    



[facility]2022-03-25 06:48:00





             Test Item    Value        Reference Range Interpretation Comments

 

             ABO/Rh (test code = ABO/Rh) AB POS                                 



[facility] DallasExfrvqmYKVZHUPHVS0909-91-31 06:48:00





             Test Item    Value        Reference Range Interpretation Comments

 

             Segs (test code = Segs) 70.8         45.0-75.0                 



[facility] DallasUmpefkpCGSBDXMSNB7258-21-06 06:48:00





             Test Item    Value        Reference Range Interpretation Comments

 

             Lymphocytes (test code = Lymphocytes) 20.8         20.0-40.0       

          



[facility] DallasUelhtlnPJECUNYMUO7626-00-08 06:48:00





             Test Item    Value        Reference Range Interpretation Comments

 

             Monocytes (test code = Monocytes) 5.8          2.0-12.0            

      



[facility] DallasPkqxvjzOIEXSHLWHD9204-28-07 06:48:00





             Test Item    Value        Reference Range Interpretation Comments

 

             Eosinophils (test code = 2.3          See_Comment                [A

utomated message] The



             Eosinophils)                                        system which ge

nerated



                                                                 this result tra

nsmitted



                                                                 reference range

: <=4.0.



                                                                 The reference r

zhen was



                                                                 not used to int

erpret



                                                                 this result as



                                                                 normal/abnormal

.



[facility] DallasIeuvbfuAMFLNMNBDR7469-35-70 06:48:00





             Test Item    Value        Reference Range Interpretation Comments

 

             Basophils (test code = 0.3          See_Comment                [Aut

omated message] The



             Basophils)                                          system which ge

nerated



                                                                 this result tra

nsmitted



                                                                 reference range

: <=1.0.



                                                                 The reference r

zhen was



                                                                 not used to int

erpret



                                                                 this result as



                                                                 normal/abnormal

.



[place] 077282-03-25 06:48:00





             Test Item    Value        Reference Range Interpretation Comments

 

             Neutrophils # (test code = Neutrophils 8.4          1.5-8.1        

           



             #)                                                  



[place] 077282-03-25 06:48:00





             Test Item    Value        Reference Range Interpretation Comments

 

             Lymphocytes # (test code = Lymphocytes 2.5          1.0-5.5        

           



             #)                                                  



[place] 077282-03-25 06:48:00





             Test Item    Value        Reference Range Interpretation Comments

 

             Monocytes # (test code 0.7          See_Comment                [Aut

omated message] The



             = Monocytes #)                                        system which 

generated



                                                                 this result tra

nsmitted



                                                                 reference range

: <=0.8.



                                                                 The reference r

zhen was



                                                                 not used to int

erpret



                                                                 this result as



                                                                 normal/abnormal

.



[place] 077282-03-25 06:48:00





             Test Item    Value        Reference Range Interpretation Comments

 

             Eosinophils # (test code 0.3          See_Comment                [A

utomated message] The



             = Eosinophils #)                                        system whic

h generated



                                                                 this result tra

nsmitted



                                                                 reference range

: <=0.5.



                                                                 The reference r

zhen was



                                                                 not used to int

erpret



                                                                 this result as



                                                                 normal/abnormal

.



[facility] DallasQvuffqkAKWLIEVMDA6348-96-11 06:48:00





             Test Item    Value        Reference Range Interpretation Comments

 

             WBC X 10x3 (test code = WBC X 10x3) 11.9         3.7-10.4          

        



[place] 077282-03-25 06:48:00





             Test Item    Value        Reference Range Interpretation Comments

 

             RBC X 10x6 (test code = RBC X 10x6) 5.95         4.70-6.10         

        



[place] 077282-03-25 06:48:00





             Test Item    Value        Reference Range Interpretation Comments

 

             Hgb (test code = Hgb) 17.9         14.0-18.0                 



[place] 077282-03-25 06:48:00





             Test Item    Value        Reference Range Interpretation Comments

 

             Hct (test code = Hct) 52.0         42.0-54.0                 



[place] [address]-03-25 06:48:00





             Test Item    Value        Reference Range Interpretation Comments

 

             MCV (test code = MCV) 87.5         80.0-94.0                 



[place] 077282-03-25 06:48:00





             Test Item    Value        Reference Range Interpretation Comments

 

             MCH (test code = MCH) 30.2 pg      27.0-31.0                 



[facility]BlfuiukHAOIUGXDLR0041[facility]-43 [facility]





             Test Item    Value        Reference Range Interpretation Comments

 

             MCHC (test code = MCHC) 34.5         32.0-36.0                 



[facility] HospitalDrvjlnqFBZUYFKGSX5096-10-86 06:48:00





             Test Item    Value        Reference Range Interpretation Comments

 

             RDW (test code = RDW) 15.4         11.5-14.5                 



[facility] HospitalQtbtzjoJGEPEGPYXO3495-81-37 06:48:00





             Test Item    Value        Reference Range Interpretation Comments

 

             Platelet (test code = Platelet) 414          133-450               

    



[facility] HospitalElbufavFOVRSIYQRA0603-71-12 06:48:00





             Test Item    Value        Reference Range Interpretation Comments

 

             MPV (test code = MPV) 8.5          7.4-10.4                  



[facility] HospitalEbwwrehVQATNDVVSQ3703-48-05 06:48:00





             Test Item    Value        Reference Range Interpretation Comments

 

             PT (test code = PT) 12.9 s       12.0-14.7                 



[facility] HospitalJvtwdgtKUWNYTIVAT6472-52-52 06:48:00





             Test Item    Value        Reference Range Interpretation Comments

 

             INR (test code = INR) 0.98 1       0.85-1.17                 



[facility] HospitalIqyifjmZPURPTFNZN8348-10-91 06:48:00





             Test Item    Value        Reference Range Interpretation Comments

 

             PTT (test code = PTT) 31.4 s       22.9-35.8                 



[facility] [employer] PPQRYDN2262-30-58 06:48:00





             Test Item    Value        Reference Range Interpretation Comments

 

             Antibody Scrn (test Negative (3/25/22 1:48                         

  



             code = Antibody Scrn) AM)                                    



[facility] [employer] [id number] 06:48:00





             Test Item    Value        Reference Range Interpretation Comments

 

             ABO/Rh (test code = ABO/Rh) AB POS                                 



[facility]





             Test Item    Value        Reference Range Interpretation Comments

 

             Segs (test code = Segs) 70.8         45.0-75.0                 



[facility]uiyxobUGZKMSJRKP0971-77-25 06:48:00





             Test Item    Value        Reference Range Interpretation Comments

 

             Lymphocytes (test code = Lymphocytes) 20.8         20.0-40.0       

          



[facility] HospitalTjebxhbNSDKEQATTI1474-00-72 06:48:00





             Test Item    Value        Reference Range Interpretation Comments

 

             Monocytes (test code = Monocytes) 5.8          2.0-12.0            

      



[place] 077282-03-25 06:48:00





             Test Item    Value        Reference Range Interpretation Comments

 

             Eosinophils (test code = 2.3          See_Comment                [A

utomated message] The



             Eosinophils)                                        system which ge

nerated



                                                                 this result tra

nsmitted



                                                                 reference range

: <=4.0.



                                                                 The reference r

zhen was



                                                                 not used to int

erpret



                                                                 this result as



                                                                 normal/abnormal

.



[place] 077282-03-25 06:48:00





             Test Item    Value        Reference Range Interpretation Comments

 

             Basophils (test code = 0.3          See_Comment                [Aut

omated message] The



             Basophils)                                          system which ge

nerated



                                                                 this result tra

nsmitted



                                                                 reference range

: <=1.0.



                                                                 The reference r

zhen was



                                                                 not used to int

erpret



                                                                 this result as



                                                                 normal/abnormal

.



[place] 077282-03-25 06:48:00





             Test Item    Value        Reference Range Interpretation Comments

 

             Neutrophils # (test code = Neutrophils 8.4          1.5-8.1        

           



             #)                                                  



[place] 077282-03-25 06:48:00





             Test Item    Value        Reference Range Interpretation Comments

 

             Lymphocytes # (test code = Lymphocytes 2.5          1.0-5.5        

           



             #)                                                  



[place] 077282-03-25 06:48:00





             Test Item    Value        Reference Range Interpretation Comments

 

             Monocytes # (test code 0.7          See_Comment                [Aut

omated message] The



             = Monocytes #)                                        system which 

generated



                                                                 this result tra

nsmitted



                                                                 reference range

: <=0.8.



                                                                 The reference r

zhen was



                                                                 not used to int

erpret



                                                                 this result as



                                                                 normal/abnormal

.



[place] 077282-03-25 06:48:00





             Test Item    Value        Reference Range Interpretation Comments

 

             Eosinophils # (test code 0.3          See_Comment                [A

utomated message] The



             = Eosinophils #)                                        system whic

h generated



                                                                 this result tra

nsmitted



                                                                 reference range

: <=0.5.



                                                                 The reference r

zhen was



                                                                 not used to int

erpret



                                                                 this result as



                                                                 normal/abnormal

.



[place] 077282-03-25 06:48:00





             Test Item    Value        Reference Range Interpretation Comments

 

             WBC X 10x3 (test code = WBC X 10x3) 11.9         3.7-10.4          

        



[place] 077282-03-25 06:48:00





             Test Item    Value        Reference Range Interpretation Comments

 

             RBC X 10x6 (test code = RBC X 10x6) 5.95         4.70-6.10         

        



[facility]





             Test Item    Value        Reference Range Interpretation Comments

 

             Hgb (test code = Hgb) 17.9         14.0-18.0                 



[facility]PnguomzOWVHEVUKLF0762-38-65 06:48:00





             Test Item    Value        Reference Range Interpretation Comments

 

             Hct (test code = Hct) 52.0         42.0-54.0                 



[facility] DallasWekxcxiGMSEHYRZYC4645-54-43 06:48:00





             Test Item    Value        Reference Range Interpretation Comments

 

             MCV (test code = MCV) 87.5         80.0-94.0                 



[facility]PuuclslSBPEPGUPHG1257-85-62 06:48:00





             Test Item    Value        Reference Range Interpretation Comments

 

             MCH (test code = MCH) 30.2 pg      27.0-31.0                 



[facility] HospitalDtakqtvWHQTJOJOUF3565-55-82 06:48:00





             Test Item    Value        Reference Range Interpretation Comments

 

             MCHC (test code = MCHC) 34.5         32.0-36.0                 



[facility] DallasIlycvsoXPAKJFRTDS8787-16-99 06:48:00





             Test Item    Value        Reference Range Interpretation Comments

 

             RDW (test code = RDW) 15.4         11.5-14.5                 



[facility] DallasGyldedpEVLTOUYHQY6018-02-71 06:48:00





             Test Item    Value        Reference Range Interpretation Comments

 

             Platelet (test code = Platelet) 414          133-450               

    



[facility] DallasVwgcsxlDORCTKTSZH0156-56-61 06:48:00





             Test Item    Value        Reference Range Interpretation Comments

 

             MPV (test code = MPV) 8.5          7.4-10.4                  



[facility] DallasOacemstXPLIYDJKHH1195-98-90 06:48:00





             Test Item    Value        Reference Range Interpretation Comments

 

             PT (test code = PT) 12.9 s       12.0-14.7                 



[facility] DallasSvoztomBJCJFWQWCA0650-03-38 06:48:00





             Test Item    Value        Reference Range Interpretation Comments

 

             INR (test code = INR) 0.98 1       0.85-1.17                 



[facility] DallasBtifalaOFVKJBMRWP9354-68-44 06:48:00





             Test Item    Value        Reference Range Interpretation Comments

 

             PTT (test code = PTT) 31.4 s       22.9-35.8                 



[facility] WEZCNNH4305-70-36 06:48:00





             Test Item    Value        Reference Range Interpretation Comments

 

             Antibody Scrn (test Negative (3/25/22 1:48                         

  



             code = Antibody Scrn) AM)                                    



[facility] NQXPYKP8075-47-05 06:48:00





             Test Item    Value        Reference Range Interpretation Comments

 

             ABO/Rh (test code = ABO/Rh) AB POS                                 



[facility] DallasMooxqitYHXVHXNBFV5836-65-30 06:48:00





             Test Item    Value        Reference Range Interpretation Comments

 

             Segs (test code = Segs) 70.8         45.0-75.0                 



[facility] DallasXjosljkCWNKYPVMUJ8600-17-13 06:48:00





             Test Item    Value        Reference Range Interpretation Comments

 

             Lymphocytes (test code = Lymphocytes) 20.8         20.0-40.0       

          



[facility] DallasTxmgsgmTSNDBJUWLB5726-54-00 06:48:00





             Test Item    Value        Reference Range Interpretation Comments

 

             Monocytes (test code = Monocytes) 5.8          2.0-12.0            

      



[facility] DallasWpzxanoLCPCGTSIVN7766-25-96 06:48:00





             Test Item    Value        Reference Range Interpretation Comments

 

             Eosinophils (test code = 2.3          See_Comment                [A

utomated message] The



             Eosinophils)                                        system which ge

nerated



                                                                 this result tra

nsmitted



                                                                 reference range

: <=4.0.



                                                                 The reference r

zhen was



                                                                 not used to int

erpret



                                                                 this result as



                                                                 normal/abnormal

.



[facility] DallasMstbbidFSSZCPRVHA4009-57-14 06:48:00





             Test Item    Value        Reference Range Interpretation Comments

 

             Basophils (test code = 0.3          See_Comment                [Aut

omated message] The



             Basophils)                                          system which ge

nerated



                                                                 this result tra

nsmitted



                                                                 reference range

: <=1.0.



                                                                 The reference r

zhen was



                                                                 not used to int

erpret



                                                                 this result as



                                                                 normal/abnormal

.



[facility] DallasXtugmmiYEGNJTSLVC6656-28-51 06:48:00





             Test Item    Value        Reference Range Interpretation Comments

 

             Neutrophils # (test code = Neutrophils 8.4          1.5-8.1        

           



             #)                                                  



[facility] DallasIwbhtkmKACXWYRNFP6800-48-32 06:48:00





             Test Item    Value        Reference Range Interpretation Comments

 

             Lymphocytes # (test code = Lymphocytes 2.5          1.0-5.5        

           



             #)                                                  



[facility] DallasHvgtrceRKGHFKRMES5927-42-16 06:48:00





             Test Item    Value        Reference Range Interpretation Comments

 

             Monocytes # (test code 0.7          See_Comment                [Aut

omated message] The



             = Monocytes #)                                        system which 

generated



                                                                 this result tra

nsmitted



                                                                 reference range

: <=0.8.



                                                                 The reference r

zhen was



                                                                 not used to int

erpret



                                                                 this result as



                                                                 normal/abnormal

.



[facility] DallasHmqppzdVMJJBQQUVJ3651-30-61 06:48:00





             Test Item    Value        Reference Range Interpretation Comments

 

             Eosinophils # (test code 0.3          See_Comment                [A

utomated message] The



             = Eosinophils #)                                        system whic

h generated



                                                                 this result tra

nsmitted



                                                                 reference range

: <=0.5.



                                                                 The reference r

zhen was



                                                                 not used to int

erpret



                                                                 this result as



                                                                 normal/abnormal

.



[facility] DallasExaxcwzKODUIKSGMS3653-47-70 06:48:00





             Test Item    Value        Reference Range Interpretation Comments

 

             WBC X 10x3 (test code = WBC X 10x3) 11.9         3.7-10.4          

        



[facility] DallasLqgazdxPGIEVBZMNJ9031-50-25 06:48:00





             Test Item    Value        Reference Range Interpretation Comments

 

             RBC X 10x6 (test code = RBC X 10x6) 5.95         4.70-6.10         

        



[place] 077282-03-25 06:48:00





             Test Item    Value        Reference Range Interpretation Comments

 

             Hgb (test code = Hgb) 17.9         14.0-18.0                 



[facility] DallasYmordczTRFKTSVJHB0740-65-27 06:48:00





             Test Item    Value        Reference Range Interpretation Comments

 

             Hct (test code = Hct) 52.0         42.0-54.0                 



[place] 077282-03-25 06:48:00





             Test Item    Value        Reference Range Interpretation Comments

 

             MCV (test code = MCV) 87.5         80.0-94.0                 



[place] 077282-03-25 06:48:00





             Test Item    Value        Reference Range Interpretation Comments

 

             MCH (test code = MCH) 30.2 pg      27.0-31.0                 



[facility] DallasOdzhzuzKYPGDABKKE4728-15-62 06:48:00





             Test Item    Value        Reference Range Interpretation Comments

 

             MCHC (test code = MCHC) 34.5         32.0-36.0                 



[place] 077282-03-25 06:48:00





             Test Item    Value        Reference Range Interpretation Comments

 

             RDW (test code = RDW) 15.4         11.5-14.5                 



[facility] DallasEvkoxfdRABHERJSXM7700-91-36 06:48:00





             Test Item    Value        Reference Range Interpretation Comments

 

             Platelet (test code = Platelet) 414          133-450               

    



[facility] DallasWcseratDNWFUOWBJR1688-00-77 06:48:00





             Test Item    Value        Reference Range Interpretation Comments

 

             MPV (test code = MPV) 8.5          7.4-10.4                  



[facility] DallasXprilwlXATWTWYMOB2571-63-52 06:48:00





             Test Item    Value        Reference Range Interpretation Comments

 

             PT (test code = PT) 12.9 s       12.0-14.7                 



[facility] DallasScnicsxULKKPRIHRE1453-28-32 06:48:00





             Test Item    Value        Reference Range Interpretation Comments

 

             INR (test code = INR) 0.98 1       0.85-1.17                 



[facility] DallasVgyrsjiBGXVOVSEUN8339-35-72 06:48:00





             Test Item    Value        Reference Range Interpretation Comments

 

             PTT (test code = PTT) 31.4 s       22.9-35.8                 



[facility] CHDYBPJ6954-21-26 06:48:00





             Test Item    Value        Reference Range Interpretation Comments

 

             Antibody Scrn (test Negative (3/25/22 1:48                         

  



             code = Antibody Scrn) AM)                                    



[facility] LNIDCYJ8633-92-89 06:48:00





             Test Item    Value        Reference Range Interpretation Comments

 

             ABO/Rh (test code = ABO/Rh) AB POS                                 



[facility] DallasDoftonwEEGYACLZKS8500-87-40 06:48:00





             Test Item    Value        Reference Range Interpretation Comments

 

             Segs (test code = Segs) 70.8         45.0-75.0                 



[facility] DallasGehdjooNQLAUHQBMF0726-57-22 06:48:00





             Test Item    Value        Reference Range Interpretation Comments

 

             Lymphocytes (test code = Lymphocytes) 20.8         20.0-40.0       

          



[facility] DallasMbizxzrDGLDLJMNAD7636-73-53 06:48:00





             Test Item    Value        Reference Range Interpretation Comments

 

             Monocytes (test code = Monocytes) 5.8          2.0-12.0            

      



[facility] DallasDokoewsEOKPRRPBHK5944-90-74 06:48:00





             Test Item    Value        Reference Range Interpretation Comments

 

             Eosinophils (test code = 2.3          See_Comment                [A

utomated message] The



             Eosinophils)                                        system which ge

nerated



                                                                 this result tra

nsmitted



                                                                 reference range

: <=4.0.



                                                                 The reference r

zhen was



                                                                 not used to int

erpret



                                                                 this result as



                                                                 normal/abnormal

.



[facility] DallasMcvelevNLTAZKSGXG2197-62-04 06:48:00





             Test Item    Value        Reference Range Interpretation Comments

 

             Basophils (test code = 0.3          See_Comment                [Aut

omated message] The



             Basophils)                                          system which ge

nerated



                                                                 this result tra

nsmitted



                                                                 reference range

: <=1.0.



                                                                 The reference r

zhen was



                                                                 not used to int

erpret



                                                                 this result as



                                                                 normal/abnormal

.



[place] 077282-03-25 06:48:00





             Test Item    Value        Reference Range Interpretation Comments

 

             Neutrophils # (test code = Neutrophils 8.4          1.5-8.1        

           



             #)                                                  



[place] 077282-03-25 06:48:00





             Test Item    Value        Reference Range Interpretation Comments

 

             Lymphocytes # (test code = Lymphocytes 2.5          1.0-5.5        

           



             #)                                                  



[place] 077282-03-25 06:48:00





             Test Item    Value        Reference Range Interpretation Comments

 

             Monocytes # (test code 0.7          See_Comment                [Aut

omated message] The



             = Monocytes #)                                        system which 

generated



                                                                 this result tra

nsmitted



                                                                 reference range

: <=0.8.



                                                                 The reference r

zhen was



                                                                 not used to int

erpret



                                                                 this result as



                                                                 normal/abnormal

.



[place] [address]-03-25 06:48:00





             Test Item    Value        Reference Range Interpretation Comments

 

             Eosinophils # (test code 0.3          See_Comment                [A

utomated message] The



             = Eosinophils #)                                        system whic

h generated



                                                                 this result tra

nsmitted



                                                                 reference range

: <=0.5.



                                                                 The reference r

zhen was



                                                                 not used to int

erpret



                                                                 this result as



                                                                 normal/abnormal

.



[facility] DallasOokaejwSPHLMWDHJV2658-45-00 06:48:00





             Test Item    Value        Reference Range Interpretation Comments

 

             WBC X 10x3 (test code = WBC X 10x3) 11.9         3.7-10.4          

        



[place] 077282-03-25 06:48:00





             Test Item    Value        Reference Range Interpretation Comments

 

             RBC X 10x6 (test code = RBC X 10x6) 5.95         4.70-6.10         

        



[place] 077282-03-25 06:48:00





             Test Item    Value        Reference Range Interpretation Comments

 

             Hgb (test code = Hgb) 17.9         14.0-18.0                 



[place] [address]-03-25 06:48:00





             Test Item    Value        Reference Range Interpretation Comments

 

             Hct (test code = Hct) 52.0         42.0-54.0                 



[place] [address]-03-25 06:48:00





             Test Item    Value        Reference Range Interpretation Comments

 

             MCV (test code = MCV) 87.5         80.0-94.0                 



[place] [address]-03-25 06:48:00





             Test Item    Value        Reference Range Interpretation Comments

 

             MCH (test code = MCH) 30.2 pg      27.0-31.0                 



[facility] DallasObfdrmfFDHEHPECFP4624-69-31 06:48:00





             Test Item    Value        Reference Range Interpretation Comments

 

             MCHC (test code = MCHC) 34.5         32.0-36.0                 



[facility] DallasCmkcdbnZNDKKHICKJ7075-58-79 06:48:00





             Test Item    Value        Reference Range Interpretation Comments

 

             RDW (test code = RDW) 15.4         11.5-14.5                 



[facility] DallasJdrjcjcFVOFRDRPKM2805-10-51 06:48:00





             Test Item    Value        Reference Range Interpretation Comments

 

             Platelet (test code = Platelet) 414          133-450               

    



[facility]JxtipbdNWUFPKENLB5438-42-55 06:48:00





             Test Item    Value        Reference Range Interpretation Comments

 

             MPV (test code = MPV) 8.5          7.4-10.4                  



[facility] DallasBbselsxNFEWVBNWHY2058-99-78 06:48:00





             Test Item    Value        Reference Range Interpretation Comments

 

             PT (test code = PT) 12.9 s       12.0-14.7                 



[id number]





             Test Item    Value        Reference Range Interpretation Comments

 

             INR (test code = INR) 0.98 1       0.85-1.17                 



[id number] 06:48:00





             Test Item    Value        Reference Range Interpretation Comments

 

             PTT (test code = PTT) 31.4 s       22.9-35.8                 



[facility] WITH [id number] 00:23:28





             Test Item    Value        Reference Range Interpretation Comments

 

             WBC (test code =              See_Comment  H             [Automated



             9190-2)                                             message] The sy

stem



                                                                 which generated



                                                                 this result



                                                                 transmitted



                                                                 reference range

:



                                                                 4.20 - 10.70



                                                                 10*3/?L. The



                                                                 reference range

 was



                                                                 not used to



                                                                 interpret this



                                                                 result as



                                                                 normal/abnormal

.

 

             RBC (test code =              See_Comment  H             [Automated



             789-8)                                              message] The sy

stem



                                                                 which generated



                                                                 this result



                                                                 transmitted



                                                                 reference range

:



                                                                 4.26 - 5.52



                                                                 10*6/?L. The



                                                                 reference range

 was



                                                                 not used to



                                                                 interpret this



                                                                 result as



                                                                 normal/abnormal

.

 

             HGB (test code = 18.3 g/dL    12.2-16.4    H            



             718-7)                                              

 

             HCT (test code = 55.6 %       38.4-49.3    H            



             4544-3)                                             

 

             MCV (test code = 89.0 fL      81.7-95.6                 



             787-2)                                              

 

             MCH (test code = 29.3 pg      26.1-32.7                 



             785-6)                                              

 

             MCHC (test code = 32.9 g/dL    31.2-35.0                 



             786-4)                                              

 

             RDW-SD (test code = 47.6 fL      38.5-51.6                 



             41406-2)                                            

 

             RDW-CV (test code = 15.1 %       12.1-15.4                 



             788-0)                                              

 

             PLT (test code =              See_Comment  H             [Automated



             777-3)                                              message] The sy

stem



                                                                 which generated



                                                                 this result



                                                                 transmitted



                                                                 reference range

:



                                                                 150 - 328 10*3/

?L.



                                                                 The reference r

zhen



                                                                 was not used to



                                                                 interpret this



                                                                 result as



                                                                 normal/abnormal

.

 

             MPV (test code = 10.5 fL      9.8-13.0                  



             04089-6)                                            

 

             NRBC/100 WBC (test              See_Comment                [Automat

ed



             code = 9408354049)                                        message] 

The system



                                                                 which generated



                                                                 this result



                                                                 transmitted



                                                                 reference range

:



                                                                 0.0 - 10.0 /100



                                                                 WBCs. The refer

ence



                                                                 range was not u

sed



                                                                 to interpret th

is



                                                                 result as



                                                                 normal/abnormal

.

 

             NRBC x10^3 (test code <0.01        See_Comment                [Auto

mated



             = [id number])                                        message] The s

ystem



                                                                 which generated



                                                                 this result



                                                                 transmitted



                                                                 reference range

:



                                                                 10*3/?L. The



                                                                 reference range

 was



                                                                 not used to



                                                                 interpret this



                                                                 result as



                                                                 normal/abnormal

.

 

             GRAN MAT (NEUT) % 66.7 %                                 



             (test code = 770-8)                                        

 

             IMM GRAN % (test code 0.40 %                                 



             = 6238174080)                                        

 

             LYMPH % (test code = 24.4 %                                 



             736-9)                                              

 

             MONO % (test code = 6.6 %                                  



             5905-5)                                             

 

             EOS % (test code = 1.5 %                                  



             713-8)                                              

 

             BASO % (test code = 0.4 %                                  



             706-2)                                              

 

             GRAN MAT x10^3(ANC) 7.43 10*3/uL 1.99-6.95    H            



             (test code =                                        



             6391800641)                                         

 

             IMM GRAN x10^3 (test 0.04 10*3/uL 0.00-0.06                 



             code = 1087055583)                                        

 

             LYMPH x10^3 (test code 2.72 10*3/uL 1.09-3.23                 



             = 731-0)                                            

 

             MONO x10^3 (test code 0.74 10*3/uL 0.36-1.02                 



             = 742-7)                                            

 

             EOS x10^3 (test code = 0.17 10*3/uL 0.06-0.53                 



             711-2)                                              

 

             BASO x10^3 (test code 0.04 10*3/uL 0.01-0.09                 



             = 704-7)                                            

 

             Lab Interpretation Abnormal                               



             (test code = 05223-1)                                        



[facility]. METABOLIC PANEL ([id number])2022 
00:18:07





             Test Item    Value        Reference Range Interpretation Comments

 

             NA (test code = 139 mmol/L   135-145                   



             5110101416)                                         

 

             K (test code = 4.8 mmol/L   3.5-5.0                   



             0175838260)                                         

 

             CL (test code = 104 mmol/L                       



             6123908836)                                         

 

             CO2 TOTAL (test code = 22 mmol/L    23-31        L            



             6624580322)                                         

 

             AGAP (test code =              2-16                      



             7824300623)                                         

 

             BUN (test code = 15 mg/dL     7-23                      



             6956183011)                                         

 

             GLUCOSE (test code = 93 mg/dL                         



             8890575638)                                         

 

             CREATININE (test code = 0.67 mg/dL   0.60-1.25                 



             1508596301)                                         

 

             TOTAL BILI (test code = 0.7 mg/dL    0.1-1.1                   



             8216597206)                                         

 

             CALCIUM (test code = 9.8 mg/dL    8.6-10.6                  



             5422353902)                                         

 

             T PROTEIN (test code = 8.9 g/dL     6.3-8.2      H            



             9690856792)                                         

 

             ALBUMIN (test code = 4.9 g/dL     3.5-5.0                   



             8946549325)                                         

 

             ALK PHOS (test code = 126 U/L             H            



             8664526401)                                         

 

             ALTv (test code = 43 U/L       5-50                      



             1742-6)                                             

 

             AST(SGOT) (test code = 28 U/L       13-40                     



             6076534655)                                         

 

             eGFR (test code =              mL/min/1.73m2              



             9013676613)                                         

 

             MAL (test code = MAL) Association of                           



                          Glomerular Filtration                           



                          Rate (GFR) and Staging                           



                          of Kidney Disease*                           



                          +---------------------                           



                          --+-------------------                           



                          --+-------------------                           



                          ------+| GFR                           



                          (mL/min/1.73 m2) ?|                           



                          With Kidney Damage ?|                           



                          ?Without Kidney                           



                          Damage+---------------                           



                          --------+-------------                           



                          --------+-------------                           



                          ------------+| ?>90 ?                           



                          ? ? ? ? ? ? ? ?|                           



                          ?Stage one ? ? ? ? ?|                           



                          ? Normal ? ? ? ? ? ? ?                           



                          ?+--------------------                           



                          ---+------------------                           



                          ---+------------------                           



                          -------+| ?60-89 ? ? ?                           



                          ? ? ? ? ?| ?Stage two                           



                          ? ? ? ? ?| ? Decreased                           



                          GFR ? ? ? ?                            



                          +---------------------                           



                          --+-------------------                           



                          --+-------------------                           



                          ------+| ?30-59 ? ? ?                           



                          ? ? ? ? ?| ?Stage                           



                          three ? ? ? ?| ? Stage                           



                          three ? ? ? ? ?                           



                          +---------------------                           



                          --+-------------------                           



                          --+-------------------                           



                          ------+| ?15-29 ? ? ?                           



                          ? ? ? ? ?| ?Stage four                           



                          ? ? ? ? | ? Stage four                           



                          ? ? ? ? ?                              



                          ?+--------------------                           



                          ---+------------------                           



                          ---+------------------                           



                          -------+| ?<15 (or                           



                          dialysis) ? ?| ?Stage                           



                          five ? ? ? ? | ? Stage                           



                          five ? ? ? ? ?                           



                          ?+--------------------                           



                          ---+------------------                           



                          ---+------------------                           



                          -------+ *Each stage                           



                          assumes the associated                           



                          GFR level has been in                           



                          effect for at least                           



                          three months. ?Stages                           



                          1 to 5, with or                           



                          without kidney                           



                          disease, indicate                           



                          chronic kidney                           



                          disease. Notes:                           



                          Determination of                           



                          stages one and two                           



                          (with eGFR                             



                          >59mL/min/1.73 m2)                           



                          requires estimation of                           



                          kidney damage for at                           



                          least three months as                           



                          defined by structural                           



                          or functional                           



                          abnormalities of the                           



                          kidney, manifested by                           



                          either:Pathological                           



                          abnormalities or                           



                          Markers of kidney                           



                          damage (including                           



                          abnormalities in the                           



                          composition of the                           



                          blood or urine or                           



                          abnormalities in                           



                          imaging tests).                           

 

             Lab Interpretation Abnormal                               



             (test code = 27790-5)                                        



[facility]. METABOLIC PANEL ([id number])2022 
12:48:40





             Test Item    Value        Reference Range Interpretation Comments

 

             NA (test code = 137 mmol/L   135-145                   



             6665938863)                                         

 

             K (test code = 4.5 mmol/L   3.5-5.0                   



             1819163065)                                         

 

             CL (test code = 107 mmol/L                       



             6535625969)                                         

 

             CO2 TOTAL (test code = 24 mmol/L    23-31                     



             9324609348)                                         

 

             AGAP (test code =              2-16                      



             1841335634)                                         

 

             BUN (test code = 16 mg/dL     7-23                      



             7903726720)                                         

 

             GLUCOSE (test code = 116 mg/dL           H            



             1796677697)                                         

 

             CREATININE (test code = 0.62 mg/dL   0.60-1.25                 



             2259213937)                                         

 

             TOTAL BILI (test code = 0.4 mg/dL    0.1-1.1                   



             8520102878)                                         

 

             CALCIUM (test code = 8.7 mg/dL    8.6-10.6                  



             6409711008)                                         

 

             T PROTEIN (test code = 7.5 g/dL     6.3-8.2                   



             9009852370)                                         

 

             ALBUMIN (test code = 3.9 g/dL     3.5-5.0                   



             3805200459)                                         

 

             ALK PHOS (test code = 93 U/L                           



             1088497178)                                         

 

             ALTv (test code = 40 U/L       5-50                      



             1742-6)                                             

 

             AST(SGOT) (test code = 51 U/L       13-40        H            



             7909572669)                                         

 

             eGFR (test code =              mL/min/1.73m2              



             3760483192)                                         

 

             MAL (test code = MAL) Association of                           



                          Glomerular Filtration                           



                          Rate (GFR) and Staging                           



                          of Kidney Disease*                           



                          +---------------------                           



                          --+-------------------                           



                          --+-------------------                           



                          ------+| GFR                           



                          (mL/min/1.73 m2) ?|                           



                          With Kidney Damage ?|                           



                          ?Without Kidney                           



                          Damage+---------------                           



                          --------+-------------                           



                          --------+-------------                           



                          ------------+| ?>90 ?                           



                          ? ? ? ? ? ? ? ?|                           



                          ?Stage one ? ? ? ? ?|                           



                          ? Normal ? ? ? ? ? ? ?                           



                          ?+--------------------                           



                          ---+------------------                           



                          ---+------------------                           



                          -------+| ?60-89 ? ? ?                           



                          ? ? ? ? ?| ?Stage two                           



                          ? ? ? ? ?| ? Decreased                           



                          GFR ? ? ? ?                            



                          +---------------------                           



                          --+-------------------                           



                          --+-------------------                           



                          ------+| ?30-59 ? ? ?                           



                          ? ? ? ? ?| ?Stage                           



                          three ? ? ? ?| ? Stage                           



                          three ? ? ? ? ?                           



                          +---------------------                           



                          --+-------------------                           



                          --+-------------------                           



                          ------+| ?15-29 ? ? ?                           



                          ? ? ? ? ?| ?Stage four                           



                          ? ? ? ? | ? Stage four                           



                          ? ? ? ? ?                              



                          ?+--------------------                           



                          ---+------------------                           



                          ---+------------------                           



                          -------+| ?<15 (or                           



                          dialysis) ? ?| ?Stage                           



                          five ? ? ? ? | ? Stage                           



                          five ? ? ? ? ?                           



                          ?+--------------------                           



                          ---+------------------                           



                          ---+------------------                           



                          -------+ *Each stage                           



                          assumes the associated                           



                          GFR level has been in                           



                          effect for at least                           



                          three months. ?Stages                           



                          1 to 5, with or                           



                          without kidney                           



                          disease, indicate                           



                          chronic kidney                           



                          disease. Notes:                           



                          Determination of                           



                          stages one and two                           



                          (with eGFR                             



                          >59mL/min/1.73 m2)                           



                          requires estimation of                           



                          kidney damage for at                           



                          least three months as                           



                          defined by structural                           



                          or functional                           



                          abnormalities of the                           



                          kidney, manifested by                           



                          either:Pathological                           



                          abnormalities or                           



                          Markers of kidney                           



                          damage (including                           



                          abnormalities in the                           



                          composition of the                           



                          blood or urine or                           



                          abnormalities in                           



                          imaging tests).                           

 

             Lab Interpretation Abnormal                               



             (test code = 31953-0)                                        



[facility] WITH [id number] 12:21:36





             Test Item    Value        Reference Range Interpretation Comments

 

             WBC (test code =              See_Comment                [Automated



             6690-2)                                             message] The sy

stem



                                                                 which generated



                                                                 this result



                                                                 transmitted



                                                                 reference range

:



                                                                 4.20 - 10.70



                                                                 10*3/?L. The



                                                                 reference range

 was



                                                                 not used to



                                                                 interpret this



                                                                 result as



                                                                 normal/abnormal

.

 

             RBC (test code =              See_Comment                [Automated



             789-8)                                              message] The sy

stem



                                                                 which generated



                                                                 this result



                                                                 transmitted



                                                                 reference range

:



                                                                 4.26 - 5.52



                                                                 10*6/?L. The



                                                                 reference range

 was



                                                                 not used to



                                                                 interpret this



                                                                 result as



                                                                 normal/abnormal

.

 

             HGB (test code = 15.7 g/dL    12.2-16.4                 



             718-7)                                              

 

             HCT (test code = 48.0 %       38.4-49.3                 



             4544-3)                                             

 

             MCV (test code = 90.1 fL      81.7-95.6                 



             787-2)                                              

 

             MCH (test code = 29.5 pg      26.1-32.7                 



             785-6)                                              

 

             MCHC (test code = 32.7 g/dL    31.2-35.0                 



             786-4)                                              

 

             RDW-SD (test code = 50.6 fL      38.5-51.6                 



             32388-1)                                            

 

             RDW-CV (test code = 15.3 %       12.1-15.4                 



             788-0)                                              

 

             PLT (test code =              See_Comment  H             [Automated



             777-3)                                              message] The sy

stem



                                                                 which generated



                                                                 this result



                                                                 transmitted



                                                                 reference range

:



                                                                 150 - 328 10*3/

?L.



                                                                 The reference r

zhen



                                                                 was not used to



                                                                 interpret this



                                                                 result as



                                                                 normal/abnormal

.

 

             MPV (test code = 10.3 fL      9.8-13.0                  



             64494-3)                                            

 

             NRBC/100 WBC (test              See_Comment                [Automat

ed



             code = 7278395677)                                        message] 

The system



                                                                 which generated



                                                                 this result



                                                                 transmitted



                                                                 reference range

:



                                                                 0.0 - 10.0 /100



                                                                 WBCs. The refer

ence



                                                                 range was not u

sed



                                                                 to interpret th

is



                                                                 result as



                                                                 normal/abnormal

.

 

             NRBC x10^3 (test code <0.01        See_Comment                [Auto

mated



             = [id number])                                        message] The s

ystem



                                                                 which generated



                                                                 this result



                                                                 transmitted



                                                                 reference range

:



                                                                 10*3/?L. The



                                                                 reference range

 was



                                                                 not used to



                                                                 interpret this



                                                                 result as



                                                                 normal/abnormal

.

 

             GRAN MAT (NEUT) % 61.5 %                                 



             (test code = 770-8)                                        

 

             IMM GRAN % (test code 0.80 %                                 



             = [id number])                                        

 

             LYMPH % (test code = 27.8 %                                 



             736-9)                                              

 

             MONO % (test code = 6.9 %                                  



             5905-5)                                             

 

             EOS % (test code = 2.4 %                                  



             713-8)                                              

 

             BASO % (test code = 0.6 %                                  



             706-2)                                              

 

             GRAN MAT x10^3(ANC) 6.07 10*3/uL 1.99-6.95                 



             (test code =                                        



             9280506710)                                         

 

             IMM GRAN x10^3 (test 0.08 10*3/uL 0.00-0.06    H            



             code = 9140863502)                                        

 

             LYMPH x10^3 (test code 2.75 10*3/uL 1.09-3.23                 



             = 731-0)                                            

 

             MONO x10^3 (test code 0.68 10*3/uL 0.36-1.02                 



             = 742-7)                                            

 

             EOS x10^3 (test code = 0.24 10*3/uL 0.06-0.53                 



             711-2)                                              

 

             BASO x10^3 (test code 0.06 10*3/uL 0.01-0.09                 



             = 704-7)                                            

 

             Lab Interpretation Abnormal                               



             (test code = 26038-4)                                        



[facility] Metabolic Panel (NA, K, CL, CO2, 
GLUCOSE, BUN, CREATININE, CA)2022 12:40:30





             Test Item    Value        Reference Range Interpretation Comments

 

             NA (test code = 139 mmol/L   135-145                   



             9896845900)                                         

 

             K (test code = 3.9 mmol/L   3.5-5.0                   



             9068635789)                                         

 

             CL (test code = 108 mmol/L                       



             5737107798)                                         

 

             CO2 TOTAL (test code = 25 mmol/L    23-31                     



             6737085310)                                         

 

             AGAP (test code =              2-16                      



             9039303581)                                         

 

             BUN (test code = 14 mg/dL     7-23                      



             2344318443)                                         

 

             GLUCOSE (test code = 107 mg/dL                        



             4239339351)                                         

 

             CREATININE (test code = 0.61 mg/dL   0.60-1.25                 



             9218596643)                                         

 

             CALCIUM (test code = 8.1 mg/dL    8.6-10.6     L            



             4858772393)                                         

 

             eGFR (test code =              mL/min/1.73m2              



             7695607066)                                         

 

             MAL (test code = MAL) Association of                           



                          Glomerular Filtration                           



                          Rate (GFR) and Staging                           



                          of Kidney Disease*                           



                          +---------------------                           



                          --+-------------------                           



                          --+-------------------                           



                          ------+| GFR                           



                          (mL/min/1.73 m2) ?|                           



                          With Kidney Damage ?|                           



                          ?Without Kidney                           



                          Damage+---------------                           



                          --------+-------------                           



                          --------+-------------                           



                          ------------+| ?>90 ?                           



                          ? ? ? ? ? ? ? ?|                           



                          ?Stage one ? ? ? ? ?|                           



                          ? Normal ? ? ? ? ? ? ?                           



                          ?+--------------------                           



                          ---+------------------                           



                          ---+------------------                           



                          -------+| ?60-89 ? ? ?                           



                          ? ? ? ? ?| ?Stage two                           



                          ? ? ? ? ?| ? Decreased                           



                          GFR ? ? ? ?                            



                          +---------------------                           



                          --+-------------------                           



                          --+-------------------                           



                          ------+| ?30-59 ? ? ?                           



                          ? ? ? ? ?| ?Stage                           



                          three ? ? ? ?| ? Stage                           



                          three ? ? ? ? ?                           



                          +---------------------                           



                          --+-------------------                           



                          --+-------------------                           



                          ------+| ?15-29 ? ? ?                           



                          ? ? ? ? ?| ?Stage four                           



                          ? ? ? ? | ? Stage four                           



                          ? ? ? ? ?                              



                          ?+--------------------                           



                          ---+------------------                           



                          ---+------------------                           



                          -------+| ?<15 (or                           



                          dialysis) ? ?| ?Stage                           



                          five ? ? ? ? | ? Stage                           



                          five ? ? ? ? ?                           



                          ?+--------------------                           



                          ---+------------------                           



                          ---+------------------                           



                          -------+ *Each stage                           



                          assumes the associated                           



                          GFR level has been in                           



                          effect for at least                           



                          three months. ?Stages                           



                          1 to 5, with or                           



                          without kidney                           



                          disease, indicate                           



                          chronic kidney                           



                          disease. Notes:                           



                          Determination of                           



                          stages one and two                           



                          (with eGFR                             



                          >59mL/min/1.73 m2)                           



                          requires estimation of                           



                          kidney damage for at                           



                          least three months as                           



                          defined by structural                           



                          or functional                           



                          abnormalities of the                           



                          kidney, manifested by                           



                          either:Pathological                           



                          abnormalities or                           



                          Markers of kidney                           



                          damage (including                           



                          abnormalities in the                           



                          composition of the                           



                          blood or urine or                           



                          abnormalities in                           



                          imaging tests).                           

 

             Lab Interpretation Abnormal                               



             (test code = 85555-2)                                        



[facility] with [id number] 12:23:51





             Test Item    Value        Reference Range Interpretation Comments

 

             WBC (test code =              See_Comment                [Automated



             9105-2)                                             message] The sy

stem



                                                                 which generated



                                                                 this result



                                                                 transmitted



                                                                 reference range

:



                                                                 4.20 - 10.70



                                                                 10*3/?L. The



                                                                 reference range

 was



                                                                 not used to



                                                                 interpret this



                                                                 result as



                                                                 normal/abnormal

.

 

             RBC (test code =              See_Comment                [Automated



             971-8)                                              message] The sy

stem



                                                                 which generated



                                                                 this result



                                                                 transmitted



                                                                 reference range

:



                                                                 4.26 - 5.52



                                                                 10*6/?L. The



                                                                 reference range

 was



                                                                 not used to



                                                                 interpret this



                                                                 result as



                                                                 normal/abnormal

.

 

             HGB (test code = 14.9 g/dL    12.2-16.4                 



             718-7)                                              

 

             HCT (test code = 44.2 %       38.4-49.3                 



             4544-3)                                             

 

             MCV (test code = 88.4 fL      81.7-95.6                 



             787-2)                                              

 

             MCH (test code = 29.8 pg      26.1-32.7                 



             785-6)                                              

 

             MCHC (test code = 33.7 g/dL    31.2-35.0                 



             786-4)                                              

 

             RDW-SD (test code = 49.3 fL      38.5-51.6                 



             59768-2)                                            

 

             RDW-CV (test code = 15.3 %       12.1-15.4                 



             788-0)                                              

 

             PLT (test code =              See_Comment  H             [Automated



             777-3)                                              message] The sy

stem



                                                                 which generated



                                                                 this result



                                                                 transmitted



                                                                 reference range

:



                                                                 150 - 328 10*3/

?L.



                                                                 The reference r

zhen



                                                                 was not used to



                                                                 interpret this



                                                                 result as



                                                                 normal/abnormal

.

 

             MPV (test code = 10.2 fL      9.8-13.0                  



             24350-7)                                            

 

             NRBC/100 WBC (test              See_Comment                [Automat

ed



             code = 5741894232)                                        message] 

The system



                                                                 which generated



                                                                 this result



                                                                 transmitted



                                                                 reference range

:



                                                                 0.0 - 10.0 /100



                                                                 WBCs. The refer

ence



                                                                 range was not u

sed



                                                                 to interpret th

is



                                                                 result as



                                                                 normal/abnormal

.

 

             NRBC x10^3 (test code <0.01        See_Comment                [Auto

mated



             = [id number])                                        message] The s

ystem



                                                                 which generated



                                                                 this result



                                                                 transmitted



                                                                 reference range

:



                                                                 10*3/?L. The



                                                                 reference range

 was



                                                                 not used to



                                                                 interpret this



                                                                 result as



                                                                 normal/abnormal

.

 

             GRAN MAT (NEUT) % 56.7 %                                 



             (test code = 770-8)                                        

 

             IMM GRAN % (test code 0.60 %                                 



             = [id number])                                        

 

             LYMPH % (test code = 31.1 %                                 



             736-9)                                              

 

             MONO % (test code = 8.7 %                                  



             5905-5)                                             

 

             EOS % (test code = 2.4 %                                  



             713-8)                                              

 

             BASO % (test code = 0.5 %                                  



             706-2)                                              

 

             GRAN MAT x10^3(ANC) 4.83 10*3/uL 1.99-6.95                 



             (test code =                                        



             9265238465)                                         

 

             IMM GRAN x10^3 (test 0.05 10*3/uL 0.00-0.06                 



             code = 7631807993)                                        

 

             LYMPH x10^3 (test code 2.64 10*3/uL 1.09-3.23                 



             = 731-0)                                            

 

             MONO x10^3 (test code 0.74 10*3/uL 0.36-1.02                 



             = 742-7)                                            

 

             EOS x10^3 (test code = 0.20 10*3/uL 0.06-0.53                 



             711-2)                                              

 

             BASO x10^3 (test code 0.04 10*3/uL 0.01-0.09                 



             = 704-7)                                            

 

             Lab Interpretation Abnormal                               



             (test code = 03681-0)                                        



[facility]. METABOLIC PANEL ([id number] 
06:32:14





             Test Item    Value        Reference Range Interpretation Comments

 

             NA (test code = 139 mmol/L   135-145                   



             1908168656)                                         

 

             K (test code = 4.5 mmol/L   3.5-5.0                   



             7928037509)                                         

 

             CL (test code = 102 mmol/L                       



             5081894184)                                         

 

             CO2 TOTAL (test code = 26 mmol/L    23-31                     



             6519802357)                                         

 

             AGAP (test code =              2-16                      



             3492809223)                                         

 

             BUN (test code = 16 mg/dL     7-23                      



             0397909230)                                         

 

             GLUCOSE (test code = 99 mg/dL                         



             5900296430)                                         

 

             CREATININE (test code = 0.83 mg/dL   0.60-1.25                 



             3862411980)                                         

 

             TOTAL BILI (test code = 0.6 mg/dL    0.1-1.1                   



             8518936933)                                         

 

             CALCIUM (test code = 9.5 mg/dL    8.6-10.6                  



             7840759837)                                         

 

             T PROTEIN (test code = 8.6 g/dL     6.3-8.2      H            



             0692245696)                                         

 

             ALBUMIN (test code = 4.6 g/dL     3.5-5.0                   



             7072562970)                                         

 

             ALK PHOS (test code = 121 U/L                          



             1820419343)                                         

 

             ALTv (test code = 58 U/L       5-50         H            



             1742-6)                                             

 

             AST(SGOT) (test code = 32 U/L       13-40                     



             0898773501)                                         

 

             eGFR (test code =              mL/min/1.73m2              



             9602218660)                                         

 

             MAL (test code = MAL) Association of                           



                          Glomerular Filtration                           



                          Rate (GFR) and Staging                           



                          of Kidney Disease*                           



                          +---------------------                           



                          --+-------------------                           



                          --+-------------------                           



                          ------+| GFR                           



                          (mL/min/1.73 m2) ?|                           



                          With Kidney Damage ?|                           



                          ?Without Kidney                           



                          Damage+---------------                           



                          --------+-------------                           



                          --------+-------------                           



                          ------------+| ?>90 ?                           



                          ? ? ? ? ? ? ? ?|                           



                          ?Stage one ? ? ? ? ?|                           



                          ? Normal ? ? ? ? ? ? ?                           



                          ?+--------------------                           



                          ---+------------------                           



                          ---+------------------                           



                          -------+| ?60-89 ? ? ?                           



                          ? ? ? ? ?| ?Stage two                           



                          ? ? ? ? ?| ? Decreased                           



                          GFR ? ? ? ?                            



                          +---------------------                           



                          --+-------------------                           



                          --+-------------------                           



                          ------+| ?30-59 ? ? ?                           



                          ? ? ? ? ?| ?Stage                           



                          three ? ? ? ?| ? Stage                           



                          three ? ? ? ? ?                           



                          +---------------------                           



                          --+-------------------                           



                          --+-------------------                           



                          ------+| ?15-29 ? ? ?                           



                          ? ? ? ? ?| ?Stage four                           



                          ? ? ? ? | ? Stage four                           



                          ? ? ? ? ?                              



                          ?+--------------------                           



                          ---+------------------                           



                          ---+------------------                           



                          -------+| ?<15 (or                           



                          dialysis) ? ?| ?Stage                           



                          five ? ? ? ? | ? Stage                           



                          five ? ? ? ? ?                           



                          ?+--------------------                           



                          ---+------------------                           



                          ---+------------------                           



                          -------+ *Each stage                           



                          assumes the associated                           



                          GFR level has been in                           



                          effect for at least                           



                          three months. ?Stages                           



                          1 to 5, with or                           



                          without kidney                           



                          disease, indicate                           



                          chronic kidney                           



                          disease. Notes:                           



                          Determination of                           



                          stages one and two                           



                          (with eGFR                             



                          >59mL/min/1.73 m2)                           



                          requires estimation of                           



                          kidney damage for at                           



                          least three months as                           



                          defined by structural                           



                          or functional                           



                          abnormalities of the                           



                          kidney, manifested by                           



                          either:Pathological                           



                          abnormalities or                           



                          Markers of kidney                           



                          damage (including                           



                          abnormalities in the                           



                          composition of the                           



                          blood or urine or                           



                          abnormalities in                           



                          imaging tests).                           

 

             Lab Interpretation Abnormal                               



             (test code = 47356-1)                                        



[facility] WITH [id number] 05:48:31





             Test Item    Value        Reference Range Interpretation Comments

 

             WBC (test code =              See_Comment  H             [Automated



             1090-2)                                             message] The sy

stem



                                                                 which generated



                                                                 this result



                                                                 transmitted



                                                                 reference range

:



                                                                 4.20 - 10.70



                                                                 10*3/?L. The



                                                                 reference range

 was



                                                                 not used to



                                                                 interpret this



                                                                 result as



                                                                 normal/abnormal

.

 

             RBC (test code =              See_Comment  H             [Automated



             929-8)                                              message] The sy

stem



                                                                 which generated



                                                                 this result



                                                                 transmitted



                                                                 reference range

:



                                                                 4.26 - 5.52



                                                                 10*6/?L. The



                                                                 reference range

 was



                                                                 not used to



                                                                 interpret this



                                                                 result as



                                                                 normal/abnormal

.

 

             HGB (test code = 17.3 g/dL    12.2-16.4    H            



             718-7)                                              

 

             HCT (test code = 51.4 %       38.4-49.3    H            



             4544-3)                                             

 

             MCV (test code = 87.7 fL      81.7-95.6                 



             787-2)                                              

 

             MCH (test code = 29.5 pg      26.1-32.7                 



             785-6)                                              

 

             MCHC (test code = 33.7 g/dL    31.2-35.0                 



             786-4)                                              

 

             RDW-SD (test code = 49.1 fL      38.5-51.6                 



             03397-4)                                            

 

             RDW-CV (test code = 15.5 %       12.1-15.4    H            



             788-0)                                              

 

             PLT (test code =              See_Comment  H             [Automated



             777-3)                                              message] The sy

stem



                                                                 which generated



                                                                 this result



                                                                 transmitted



                                                                 reference range

:



                                                                 150 - 328 10*3/

?L.



                                                                 The reference r

zhen



                                                                 was not used to



                                                                 interpret this



                                                                 result as



                                                                 normal/abnormal

.

 

             MPV (test code = 10.4 fL      9.8-13.0                  



             56077-3)                                            

 

             NRBC/100 WBC (test              See_Comment                [Automat

ed



             code = 0790125583)                                        message] 

The system



                                                                 which generated



                                                                 this result



                                                                 transmitted



                                                                 reference range

:



                                                                 0.0 - 10.0 /100



                                                                 WBCs. The refer

ence



                                                                 range was not u

sed



                                                                 to interpret th

is



                                                                 result as



                                                                 normal/abnormal

.

 

             NRBC x10^3 (test code <0.01        See_Comment                [Auto

mated



             = 4621657185)                                        message] The s

ystem



                                                                 which generated



                                                                 this result



                                                                 transmitted



                                                                 reference range

:



                                                                 10*3/?L. The



                                                                 reference range

 was



                                                                 not used to



                                                                 interpret this



                                                                 result as



                                                                 normal/abnormal

.

 

             GRAN MAT (NEUT) % 64.8 %                                 



             (test code = 770-8)                                        

 

             IMM GRAN % (test code 0.70 %                                 



             = 5847252511)                                        

 

             LYMPH % (test code = 25.2 %                                 



             736-9)                                              

 

             MONO % (test code = 6.9 %                                  



             5905-5)                                             

 

             EOS % (test code = 1.9 %                                  



             713-8)                                              

 

             BASO % (test code = 0.5 %                                  



             706-2)                                              

 

             GRAN MAT x10^3(ANC) 7.80 10*3/uL 1.99-6.95    H            



             (test code =                                        



             7530225939)                                         

 

             IMM GRAN x10^3 (test 0.08 10*3/uL 0.00-0.06    H            



             code = 8118808849)                                        

 

             LYMPH x10^3 (test code 3.04 10*3/uL 1.09-3.23                 



             = 731-0)                                            

 

             MONO x10^3 (test code 0.83 10*3/uL 0.36-1.02                 



             = 742-7)                                            

 

             EOS x10^3 (test code = 0.23 10*3/uL 0.06-0.53                 



             711-2)                                              

 

             BASO x10^3 (test code 0.06 10*3/uL 0.01-0.09                 



             = 704-7)                                            

 

             Lab Interpretation Abnormal                               



             (test code = 96484-4)                                        



[facility]. METABOLIC PANEL ([id number] 
02:19:08





             Test Item    Value        Reference Range Interpretation Comments

 

             NA (test code = 136 mmol/L   135-145                   



             4750441101)                                         

 

             K (test code = 4.4 mmol/L   3.5-5.0                   



             1983442963)                                         

 

             CL (test code = 99 mmol/L                        



             1853491367)                                         

 

             CO2 TOTAL (test code = 25 mmol/L    23-31                     



             6678906414)                                         

 

             AGAP (test code =              2-16                      



             8072616104)                                         

 

             BUN (test code = 19 mg/dL     7-23                      



             2274447322)                                         

 

             GLUCOSE (test code = 103 mg/dL                        



             4866049930)                                         

 

             CREATININE (test code = 0.70 mg/dL   0.60-1.25                 



             8917900024)                                         

 

             TOTAL BILI (test code = 0.7 mg/dL    0.1-1.1                   



             6880504336)                                         

 

             CALCIUM (test code = 9.2 mg/dL    8.6-10.6                  



             7584114350)                                         

 

             T PROTEIN (test code = 9.4 g/dL     6.3-8.2      H            



             0784809116)                                         

 

             ALBUMIN (test code = 4.8 g/dL     3.5-5.0                   



             1276365371)                                         

 

             ALK PHOS (test code = 146 U/L             H            



             5301078139)                                         

 

             ALTv (test code = 75 U/L       5-50         H            



             1742-6)                                             

 

             AST(SGOT) (test code = 37 U/L       13-40                     



             0688654620)                                         

 

             eGFR (test code =              mL/min/1.73m2              



             4873388501)                                         

 

             MAL (test code = MAL) Association of                           



                          Glomerular Filtration                           



                          Rate (GFR) and Staging                           



                          of Kidney Disease*                           



                          +---------------------                           



                          --+-------------------                           



                          --+-------------------                           



                          ------+| GFR                           



                          (mL/min/1.73 m2) ?|                           



                          With Kidney Damage ?|                           



                          ?Without Kidney                           



                          Damage+---------------                           



                          --------+-------------                           



                          --------+-------------                           



                          ------------+| ?>90 ?                           



                          ? ? ? ? ? ? ? ?|                           



                          ?Stage one ? ? ? ? ?|                           



                          ? Normal ? ? ? ? ? ? ?                           



                          ?+--------------------                           



                          ---+------------------                           



                          ---+------------------                           



                          -------+| ?60-89 ? ? ?                           



                          ? ? ? ? ?| ?Stage two                           



                          ? ? ? ? ?| ? Decreased                           



                          GFR ? ? ? ?                            



                          +---------------------                           



                          --+-------------------                           



                          --+-------------------                           



                          ------+| ?30-59 ? ? ?                           



                          ? ? ? ? ?| ?Stage                           



                          three ? ? ? ?| ? Stage                           



                          three ? ? ? ? ?                           



                          +---------------------                           



                          --+-------------------                           



                          --+-------------------                           



                          ------+| ?15-29 ? ? ?                           



                          ? ? ? ? ?| ?Stage four                           



                          ? ? ? ? | ? Stage four                           



                          ? ? ? ? ?                              



                          ?+--------------------                           



                          ---+------------------                           



                          ---+------------------                           



                          -------+| ?<15 (or                           



                          dialysis) ? ?| ?Stage                           



                          five ? ? ? ? | ? Stage                           



                          five ? ? ? ? ?                           



                          ?+--------------------                           



                          ---+------------------                           



                          ---+------------------                           



                          -------+ *Each stage                           



                          assumes the associated                           



                          GFR level has been in                           



                          effect for at least                           



                          three months. ?Stages                           



                          1 to 5, with or                           



                          without kidney                           



                          disease, indicate                           



                          chronic kidney                           



                          disease. Notes:                           



                          Determination of                           



                          stages one and two                           



                          (with eGFR                             



                          >59mL/min/1.73 m2)                           



                          requires estimation of                           



                          kidney damage for at                           



                          least three months as                           



                          defined by structural                           



                          or functional                           



                          abnormalities of the                           



                          kidney, manifested by                           



                          either:Pathological                           



                          abnormalities or                           



                          Markers of kidney                           



                          damage (including                           



                          abnormalities in the                           



                          composition of the                           



                          blood or urine or                           



                          abnormalities in                           



                          imaging tests).                           

 

             Lab Interpretation Abnormal                               



             (test code = 09659-9)                                        



[facility]





             Test Item    Value        Reference Range Interpretation Comments

 

             LIPASE (test code = 7145422661) 86 U/L       0-220                 

    

 

             Lab Interpretation (test code = Normal                             

    



             30169-6)                                            



[facility] WITH [id number] 01:55:49





             Test Item    Value        Reference Range Interpretation Comments

 

             WBC (test code =              See_Comment  H             [Automated



             6790-2)                                             message] The sy

stem



                                                                 which generated



                                                                 this result



                                                                 transmitted



                                                                 reference range

:



                                                                 4.20 - 10.70



                                                                 10*3/?L. The



                                                                 reference range

 was



                                                                 not used to



                                                                 interpret this



                                                                 result as



                                                                 normal/abnormal

.

 

             RBC (test code =              See_Comment  H             [Automated



             069-8)                                              message] The sy

stem



                                                                 which generated



                                                                 this result



                                                                 transmitted



                                                                 reference range

:



                                                                 4.26 - 5.52



                                                                 10*6/?L. The



                                                                 reference range

 was



                                                                 not used to



                                                                 interpret this



                                                                 result as



                                                                 normal/abnormal

.

 

             HGB (test code = 18.0 g/dL    12.2-16.4    H            



             718-7)                                              

 

             HCT (test code = 53.9 %       38.4-49.3    H            



             4544-3)                                             

 

             MCV (test code = 87.2 fL      81.7-95.6                 



             787-2)                                              

 

             MCH (test code = 29.1 pg      26.1-32.7                 



             785-6)                                              

 

             MCHC (test code = 33.4 g/dL    31.2-35.0                 



             786-4)                                              

 

             RDW-SD (test code = 48.7 fL      38.5-51.6                 



             18778-9)                                            

 

             RDW-CV (test code = 15.4 %       12.1-15.4                 



             788-0)                                              

 

             PLT (test code =              See_Comment  H             [Automated



             777-3)                                              message] The sy

stem



                                                                 which generated



                                                                 this result



                                                                 transmitted



                                                                 reference range

:



                                                                 150 - 328 10*3/

?L.



                                                                 The reference r

zhen



                                                                 was not used to



                                                                 interpret this



                                                                 result as



                                                                 normal/abnormal

.

 

             MPV (test code = 9.9 fL       9.8-13.0                  



             88422-7)                                            

 

             NRBC/100 WBC (test              See_Comment                [Automat

ed



             code = 2282563897)                                        message] 

The system



                                                                 which generated



                                                                 this result



                                                                 transmitted



                                                                 reference range

:



                                                                 0.0 - 10.0 /100



                                                                 WBCs. The refer

ence



                                                                 range was not u

sed



                                                                 to interpret th

is



                                                                 result as



                                                                 normal/abnormal

.

 

             NRBC x10^3 (test code <0.01        See_Comment                [Auto

mated



             = [id number])                                        message] The s

ystem



                                                                 which generated



                                                                 this result



                                                                 transmitted



                                                                 reference range

:



                                                                 10*3/?L. The



                                                                 reference range

 was



                                                                 not used to



                                                                 interpret this



                                                                 result as



                                                                 normal/abnormal

.

 

             GRAN MAT (NEUT) % 69.8 %                                 



             (test code = 770-8)                                        

 

             IMM GRAN % (test code 0.50 %                                 



             = 4501342866)                                        

 

             LYMPH % (test code = 20.5 %                                 



             736-9)                                              

 

             MONO % (test code = 6.8 %                                  



             5905-5)                                             

 

             EOS % (test code = 1.9 %                                  



             713-8)                                              

 

             BASO % (test code = 0.5 %                                  



             706-2)                                              

 

             GRAN MAT x10^3(ANC) 7.72 10*3/uL 1.99-6.95    H            



             (test code =                                        



             3934767396)                                         

 

             IMM GRAN x10^3 (test 0.05 10*3/uL 0.00-0.06                 



             code = 3101488692)                                        

 

             LYMPH x10^3 (test code 2.26 10*3/uL 1.09-3.23                 



             = 731-0)                                            

 

             MONO x10^3 (test code 0.75 10*3/uL 0.36-1.02                 



             = 742-7)                                            

 

             EOS x10^3 (test code = 0.21 10*3/uL 0.06-0.53                 



             711-2)                                              

 

             BASO x10^3 (test code 0.06 10*3/uL 0.01-0.09                 



             = 704-7)                                            

 

             Lab Interpretation Abnormal                               



             (test code = 50689-9)                                        



[id number]52





             Test Item    Value        Reference    Interpretation Comments



                                       Range                     

 

             D-DIMER (test code =              See_Comment                [Autom

ated



             7889339927)                                         message] The



                                                                 system which



                                                                 generated this



                                                                 result



                                                                 transmitted



                                                                 reference range

:



                                                                 <0.41 ?g/mL



                                                                 (FEU). The



                                                                 reference range



                                                                 was not used to



                                                                 interpret this



                                                                 result as



                                                                 normal/abnormal

.

 

             MAL (test code = This test may be                           



             MAL)         used in conjunction                           



                          with a clinical                           



                          pretest probability                           



                          (PTP) assessment                           



                          model to exclude                           



                          venous                                 



                          thromboembolism                           



                          (VTE) in patients                           



                          suspected of deep                           



                          venous thrombosis                           



                          (DVT) and pulmonary                           



                          embolism (PE) A                           



                          D-Dimer value less                           



                          than 0.50 ?g/ml                           



                          (FEU) has a negative                           



                          predicative value of                           



                          96 to 100% (95%                           



                          CI)and 97 to 100%                           



                          (95% CI) as an aid                           



                          in the diagnosis of                           



                          deep vein thrombosis                           



                          (DVT) and pulmonary                           



                          embolism when there                           



                          is low or moderate                           



                          pretest probability                           



                          of PE or DVT.                           



                          D-Dimer values are                           



                          expressed in initial                           



                          fibrinogen                             



                          equivalent units                           



                          (FEU)" The assay                           



                          results should be                           



                          used with other                           



                          information,                           



                          including the                           



                          clinical context, in                           



                          forming a diagnosis.                           

 

             Lab Interpretation Normal                                 



             (test code =                                        



             66135-9)                                            



[id number][facility] 
22:42:48





             Test Item    Value        Reference Range Interpretation Comments

 

             NA (test code = 135 mmol/L   135-145                   



             4798944543)                                         

 

             K (test code = 4.6 mmol/L   3.5-5.0                   



             4075289690)                                         

 

             CL (test code = 102 mmol/L                       



             5705989993)                                         

 

             CO2 TOTAL (test code = 24 mmol/L    23-31                     



             0077412523)                                         

 

             AGAP (test code =              2-16                      



             6158495465)                                         

 

             BUN (test code = 17 mg/dL     7-23                      



             3069958112)                                         

 

             GLUCOSE (test code = 107 mg/dL                        



             0945678959)                                         

 

             CREATININE (test code = 0.60 mg/dL   0.60-1.25                 



             0465247401)                                         

 

             TOTAL BILI (test code = 1.0 mg/dL    0.1-1.1                   



             8608140811)                                         

 

             CALCIUM (test code = 9.4 mg/dL    8.6-10.6                  



             5478825920)                                         

 

             T PROTEIN (test code = 8.6 g/dL     6.3-8.2      H            



             5985011999)                                         

 

             ALBUMIN (test code = 4.6 g/dL     3.5-5.0                   



             7772424337)                                         

 

             ALK PHOS (test code = 159 U/L             H            



             3940115575)                                         

 

             ALTv (test code = 77 U/L       5-50         H            



             1742-6)                                             

 

             AST(SGOT) (test code = 38 U/L       13-40                     



             4924322026)                                         

 

             eGFR (test code =              mL/min/1.73m2              



             0878753666)                                         

 

             MAL (test code = MAL) Association of                           



                          Glomerular Filtration                           



                          Rate (GFR) and Staging                           



                          of Kidney Disease*                           



                          +---------------------                           



                          --+-------------------                           



                          --+-------------------                           



                          ------+| GFR                           



                          (mL/min/1.73 m2) ?|                           



                          With Kidney Damage ?|                           



                          ?Without Kidney                           



                          Damage+---------------                           



                          --------+-------------                           



                          --------+-------------                           



                          ------------+| ?>90 ?                           



                          ? ? ? ? ? ? ? ?|                           



                          ?Stage one ? ? ? ? ?|                           



                          ? Normal ? ? ? ? ? ? ?                           



                          ?+--------------------                           



                          ---+------------------                           



                          ---+------------------                           



                          -------+| ?60-89 ? ? ?                           



                          ? ? ? ? ?| ?Stage two                           



                          ? ? ? ? ?| ? Decreased                           



                          GFR ? ? ? ?                            



                          +---------------------                           



                          --+-------------------                           



                          --+-------------------                           



                          ------+| ?30-59 ? ? ?                           



                          ? ? ? ? ?| ?Stage                           



                          three ? ? ? ?| ? Stage                           



                          three ? ? ? ? ?                           



                          +---------------------                           



                          --+-------------------                           



                          --+-------------------                           



                          ------+| ?15-29 ? ? ?                           



                          ? ? ? ? ?| ?Stage four                           



                          ? ? ? ? | ? Stage four                           



                          ? ? ? ? ?                              



                          ?+--------------------                           



                          ---+------------------                           



                          ---+------------------                           



                          -------+| ?<15 (or                           



                          dialysis) ? ?| ?Stage                           



                          five ? ? ? ? | ? Stage                           



                          five ? ? ? ? ?                           



                          ?+--------------------                           



                          ---+------------------                           



                          ---+------------------                           



                          -------+ *Each stage                           



                          assumes the associated                           



                          GFR level has been in                           



                          effect for at least                           



                          three months. ?Stages                           



                          1 to 5, with or                           



                          without kidney                           



                          disease, indicate                           



                          chronic kidney                           



                          disease. Notes:                           



                          Determination of                           



                          stages one and two                           



                          (with eGFR                             



                          >59mL/min/1.73 m2)                           



                          requires estimation of                           



                          kidney damage for at                           



                          least three months as                           



                          defined by structural                           



                          or functional                           



                          abnormalities of the                           



                          kidney, manifested by                           



                          either:Pathological                           



                          abnormalities or                           



                          Markers of kidney                           



                          damage (including                           



                          abnormalities in the                           



                          composition of the                           



                          blood or urine or                           



                          abnormalities in                           



                          imaging tests).                           

 

             Lab Interpretation Abnormal                               



             (test code = 45008-4)                                        



[facility] WITH [id number] 22:30:27





             Test Item    Value        Reference Range Interpretation Comments

 

             WBC (test code =              See_Comment  H             [Automated



             6690-2)                                             message] The



                                                                 system which



                                                                 generated this



                                                                 result transmit

huma



                                                                 reference range

:



                                                                 4.20 - 10.70



                                                                 10*3/?L. The



                                                                 reference range



                                                                 was not used to



                                                                 interpret this



                                                                 result as



                                                                 normal/abnormal

.

 

             RBC (test code =              See_Comment  H             [Automated



             789-8)                                              message] The



                                                                 system which



                                                                 generated this



                                                                 result transmit

huma



                                                                 reference range

:



                                                                 4.26 - 5.52



                                                                 10*6/?L. The



                                                                 reference range



                                                                 was not used to



                                                                 interpret this



                                                                 result as



                                                                 normal/abnormal

.

 

             HGB (test code = 17.5 g/dL    12.2-16.4    H            



             718-7)                                              

 

             HCT (test code = 51.0 %       38.4-49.3    H            



             4544-3)                                             

 

             MCV (test code = 86.7 fL      81.7-95.6                 



             787-2)                                              

 

             MCH (test code = 29.8 pg      26.1-32.7                 



             785-6)                                              

 

             MCHC (test code = 34.3 g/dL    31.2-35.0                 



             786-4)                                              

 

             RDW-SD (test code = 48.0 fL      38.5-51.6                 



             23762-6)                                            

 

             RDW-CV (test code = 15.1 %       12.1-15.4                 



             788-0)                                              

 

             PLT (test code =              See_Comment  H             [Automated



             777-3)                                              message] The



                                                                 system which



                                                                 generated this



                                                                 result transmit

huma



                                                                 reference range

:



                                                                 150 - 328 10*3/

?L.



                                                                 The reference



                                                                 range was not u

sed



                                                                 to interpret th

is



                                                                 result as



                                                                 normal/abnormal

.

 

             MPV (test code = 10.3 fL      9.8-13.0                  



             04743-0)                                            

 

             NRBC/100 WBC (test              See_Comment                [Automat

ed



             code = 6473917384)                                        message] 

The



                                                                 system which



                                                                 generated this



                                                                 result transmit

huma



                                                                 reference range

:



                                                                 0.0 - 10.0 /100



                                                                 WBCs. The



                                                                 reference range



                                                                 was not used to



                                                                 interpret this



                                                                 result as



                                                                 normal/abnormal

.

 

             NRBC x10^3 (test code <0.01        See_Comment                [Auto

mated



             = [id number])                                        message] The



                                                                 system which



                                                                 generated this



                                                                 result transmit

huma



                                                                 reference range

:



                                                                 10*3/?L. The



                                                                 reference range



                                                                 was not used to



                                                                 interpret this



                                                                 result as



                                                                 normal/abnormal

.

 

             GRAN MAT (NEUT) % 79.9 %                                 



             (test code = 770-8)                                        

 

             IMM GRAN % (test code 0.50 %                                 



             = 8826201222)                                        

 

             LYMPH % (test code = 11.8 %                                 



             736-9)                                              

 

             MONO % (test code = 6.6 %                                  



             5905-5)                                             

 

             EOS % (test code = 0.9 %                                  



             713-8)                                              

 

             BASO % (test code = 0.3 %                                  



             706-2)                                              

 

             GRAN MAT x10^3(ANC) 10.70 10*3/uL 1.99-6.95    H            



             (test code =                                        



             8960311882)                                         

 

             IMM GRAN x10^3 (test 0.07 10*3/uL 0.00-0.06    H            



             code = 9414251467)                                        

 

             LYMPH x10^3 (test code 1.58 10*3/uL 1.09-3.23                 



             = 731-0)                                            

 

             MONO x10^3 (test code 0.89 10*3/uL 0.36-1.02                 



             = 742-7)                                            

 

             EOS x10^3 (test code = 0.12 10*3/uL 0.06-0.53                 



             711-2)                                              

 

             BASO x10^3 (test code 0.04 10*3/uL 0.01-0.09                 



             = 704-7)                                            

 

             Lab Interpretation Abnormal                               



             (test code = 38552-4)                                        



[facility]. METABOLIC PANEL [id number] 
23:02:15





             Test Item    Value        Reference Range Interpretation Comments

 

             NA (test code = 137 mmol/L   135-145                   



             6525578495)                                         

 

             K (test code = 4.5 mmol/L   3.5-5.0                   



             6539853728)                                         

 

             CL (test code = 109 mmol/L          H            



             0000490937)                                         

 

             CO2 TOTAL (test code = 22 mmol/L    23-31        L            



             6864657392)                                         

 

             AGAP (test code =              2-16                      



             9712159274)                                         

 

             BUN (test code = 7 mg/dL      7-23                      



             2407901370)                                         

 

             GLUCOSE (test code = 87 mg/dL                         



             4925914950)                                         

 

             CREATININE (test code = 0.61 mg/dL   0.60-1.25                 



             5278144740)                                         

 

             TOTAL BILI (test code = 0.5 mg/dL    0.1-1.1                   



             0208588542)                                         

 

             CALCIUM (test code = 9.0 mg/dL    8.6-10.6                  



             8501064581)                                         

 

             T PROTEIN (test code = 6.9 g/dL     6.3-8.2                   



             4407843100)                                         

 

             ALBUMIN (test code = 3.5 g/dL     3.5-5.0                   



             5338497338)                                         

 

             ALK PHOS (test code = 112 U/L                          



             7172894494)                                         

 

             ALTv (test code = 35 U/L       5-50                      



             1742-6)                                             

 

             AST(SGOT) (test code = 21 U/L       13-40                     



             7130305254)                                         

 

             eGFR (test code =              mL/min/1.73m2              



             5527491949)                                         

 

             MAL (test code = MAL) Association of                           



                          Glomerular Filtration                           



                          Rate (GFR) and Staging                           



                          of Kidney Disease*                           



                          +---------------------                           



                          --+-------------------                           



                          --+-------------------                           



                          ------+| GFR                           



                          (mL/min/1.73 m2) ?|                           



                          With Kidney Damage ?|                           



                          ?Without Kidney                           



                          Damage+---------------                           



                          --------+-------------                           



                          --------+-------------                           



                          ------------+| ?>90 ?                           



                          ? ? ? ? ? ? ? ?|                           



                          ?Stage one ? ? ? ? ?|                           



                          ? Normal ? ? ? ? ? ? ?                           



                          ?+--------------------                           



                          ---+------------------                           



                          ---+------------------                           



                          -------+| ?60-89 ? ? ?                           



                          ? ? ? ? ?| ?Stage two                           



                          ? ? ? ? ?| ? Decreased                           



                          GFR ? ? ? ?                            



                          +---------------------                           



                          --+-------------------                           



                          --+-------------------                           



                          ------+| ?30-59 ? ? ?                           



                          ? ? ? ? ?| ?Stage                           



                          three ? ? ? ?| ? Stage                           



                          three ? ? ? ? ?                           



                          +---------------------                           



                          --+-------------------                           



                          --+-------------------                           



                          ------+| ?15-29 ? ? ?                           



                          ? ? ? ? ?| ?Stage four                           



                          ? ? ? ? | ? Stage four                           



                          ? ? ? ? ?                              



                          ?+--------------------                           



                          ---+------------------                           



                          ---+------------------                           



                          -------+| ?<15 (or                           



                          dialysis) ? ?| ?Stage                           



                          five ? ? ? ? | ? Stage                           



                          five ? ? ? ? ?                           



                          ?+--------------------                           



                          ---+------------------                           



                          ---+------------------                           



                          -------+ *Each stage                           



                          assumes the associated                           



                          GFR level has been in                           



                          effect for at least                           



                          three months. ?Stages                           



                          1 to 5, with or                           



                          without kidney                           



                          disease, indicate                           



                          chronic kidney                           



                          disease. Notes:                           



                          Determination of                           



                          stages one and two                           



                          (with eGFR                             



                          >59mL/min/1.73 m2)                           



                          requires estimation of                           



                          kidney damage for at                           



                          least three months as                           



                          defined by structural                           



                          or functional                           



                          abnormalities of the                           



                          kidney, manifested by                           



                          either:Pathological                           



                          abnormalities or                           



                          Markers of kidney                           



                          damage (including                           



                          abnormalities in the                           



                          composition of the                           



                          blood or urine or                           



                          abnormalities in                           



                          imaging tests).                           

 

             Lab Interpretation Abnormal                               



             (test code = 78608-5)                                        



[facility] WITH [id number] 22:51:57





             Test Item    Value        Reference Range Interpretation Comments

 

             WBC (test code =              See_Comment  H             [Automated



             2490-2)                                             message] The sy

stem



                                                                 which generated



                                                                 this result



                                                                 transmitted



                                                                 reference range

:



                                                                 4.20 - 10.70



                                                                 10*3/?L. The



                                                                 reference range

 was



                                                                 not used to



                                                                 interpret this



                                                                 result as



                                                                 normal/abnormal

.

 

             RBC (test code =              See_Comment                [Automated



             789-8)                                              message] The sy

stem



                                                                 which generated



                                                                 this result



                                                                 transmitted



                                                                 reference range

:



                                                                 4.26 - 5.52



                                                                 10*6/?L. The



                                                                 reference range

 was



                                                                 not used to



                                                                 interpret this



                                                                 result as



                                                                 normal/abnormal

.

 

             HGB (test code = 14.7 g/dL    12.2-16.4                 



             718-7)                                              

 

             HCT (test code = 43.7 %       38.4-49.3                 



             4544-3)                                             

 

             MCV (test code = 85.9 fL      81.7-95.6                 



             787-2)                                              

 

             MCH (test code = 28.9 pg      26.1-32.7                 



             785-6)                                              

 

             MCHC (test code = 33.6 g/dL    31.2-35.0                 



             786-4)                                              

 

             RDW-SD (test code = 42.4 fL      38.5-51.6                 



             25880-0)                                            

 

             RDW-CV (test code = 13.6 %       12.1-15.4                 



             788-0)                                              

 

             PLT (test code =              See_Comment  H             [Automated



             777-3)                                              message] The sy

stem



                                                                 which generated



                                                                 this result



                                                                 transmitted



                                                                 reference range

:



                                                                 150 - 328 10*3/

?L.



                                                                 The reference r

zhen



                                                                 was not used to



                                                                 interpret this



                                                                 result as



                                                                 normal/abnormal

.

 

             MPV (test code = 9.4 fL       9.8-13.0     L            



             62141-9)                                            

 

             NRBC/100 WBC (test              See_Comment                [Automat

ed



             code = 7524302984)                                        message] 

The system



                                                                 which generated



                                                                 this result



                                                                 transmitted



                                                                 reference range

:



                                                                 0.0 - 10.0 /100



                                                                 WBCs. The refer

ence



                                                                 range was not u

sed



                                                                 to interpret th

is



                                                                 result as



                                                                 normal/abnormal

.

 

             NRBC x10^3 (test code <0.01        See_Comment                [Auto

mated



             = [id number])                                        message] The s

ystem



                                                                 which generated



                                                                 this result



                                                                 transmitted



                                                                 reference range

:



                                                                 10*3/?L. The



                                                                 reference range

 was



                                                                 not used to



                                                                 interpret this



                                                                 result as



                                                                 normal/abnormal

.

 

             GRAN MAT (NEUT) % 76.4 %                                 



             (test code = 770-8)                                        

 

             IMM GRAN % (test code 0.40 %                                 



             = [id number])                                        

 

             LYMPH % (test code = 16.3 %                                 



             736-9)                                              

 

             MONO % (test code = 5.6 %                                  



             5905-5)                                             

 

             EOS % (test code = 1.0 %                                  



             713-8)                                              

 

             BASO % (test code = 0.3 %                                  



             706-2)                                              

 

             GRAN MAT x10^3(ANC) 8.81 10*3/uL 1.99-6.95    H            



             (test code =                                        



             6256632329)                                         

 

             IMM GRAN x10^3 (test 0.05 10*3/uL 0.00-0.06                 



             code = 1412629207)                                        

 

             LYMPH x10^3 (test code 1.88 10*3/uL 1.09-3.23                 



             = 731-0)                                            

 

             MONO x10^3 (test code 0.64 10*3/uL 0.36-1.02                 



             = 742-7)                                            

 

             EOS x10^3 (test code = 0.12 10*3/uL 0.06-0.53                 



             711-2)                                              

 

             BASO x10^3 (test code 0.03 10*3/uL 0.01-0.09                 



             = 704-7)                                            

 

             Lab Interpretation Abnormal                               



             (test code = 40723-2)                                        



[facility] 
[facility]:01:33





             Test Item    Value        Reference Range Interpretation Comments

 

             SARS-CoV-2 Rapid ID NOW Not Detected Not Detected              



             (test code = 87788-0)                                        

 

             MAL (test code = MAL) ID NOW COVID-19 Assay                        

   



                          is an isothermal                           



                          nucleic acid                           



                          amplification test                           



                          intended for the                           



                          qualitative detection                           



                          of nucleic acid from                           



                          SARS-CoV-2 viral RNA                           



                          in nasopharyngeal (NP)                           



                          specimens. It is used                           



                          under Emergency Use                           



                          Authorization (EUA) by                           



                          FDA. The limit of                           



                          detection (LOD) of the                           



                          assay is 125 Genome                           



                          Equivalents/mL. A                           



                          positive result is                           



                          indicative of the                           



                          presence of SARS-CoV-2                           



                          RNA. ?Clinical                           



                          correlation with                           



                          patient history and                           



                          other diagnostic                           



                          information is                           



                          necessary to determine                           



                          patient infection                           



                          status. A negative                           



                          (Not Detected) result                           



                          does not preclude                           



                          SARS-CoV-2 infection.                           



                          In patients with                           



                          clinical symptoms and                           



                          other tests that are                           



                          consistent with                           



                          SARS-CoV-2 infection,                           



                          negative results                           



                          should be treated as                           



                          presumptive negative                           



                          and a new specimen                           



                          should be tested with                           



                          alternative PCR                           



                          molecular test.                           



                          Invalid: Please                           



                          collect a new specimen                           



                          for repeat patient                           



                          testing if clinically                           



                          indicated.                             

 

             Lab Interpretation Normal                                 



             (test code = 64512-0)                                        



[id number][facility] 
01:00:33





             Test Item    Value        Reference Range Interpretation Comments

 

             NA (test code = 140 mmol/L   135-145                   



             9255760134)                                         

 

             K (test code = 4.4 mmol/L   3.5-5.0                   



             4503692660)                                         

 

             CL (test code = 103 mmol/L                       



             3388612420)                                         

 

             CO2 TOTAL (test code = 28 mmol/L    23-31                     



             1149089598)                                         

 

             AGAP (test code =              2-16                      



             7014438661)                                         

 

             BUN (test code = 15 mg/dL     7-23                      



             8273114097)                                         

 

             GLUCOSE (test code = 85 mg/dL                         



             1365032698)                                         

 

             CREATININE (test code = 0.60 mg/dL   0.60-1.25                 



             2220055582)                                         

 

             TOTAL BILI (test code = 1.1 mg/dL    0.1-1.1                   



             4324493499)                                         

 

             CALCIUM (test code = 9.0 mg/dL    8.6-10.6                  



             4043298295)                                         

 

             T PROTEIN (test code = 7.8 g/dL     6.3-8.2                   



             4285552742)                                         

 

             ALBUMIN (test code = 4.0 g/dL     3.5-5.0                   



             8305155172)                                         

 

             ALK PHOS (test code = 166 U/L             H            



             7417037351)                                         

 

             ALTv (test code = 42 U/L       5-50                      



             1742-6)                                             

 

             AST(SGOT) (test code = 32 U/L       13-40                     



             0340431853)                                         

 

             eGFR (test code =              mL/min/1.73m2              



             9099783849)                                         

 

             MAL (test code = MAL) Association of                           



                          Glomerular Filtration                           



                          Rate (GFR) and Staging                           



                          of Kidney Disease*                           



                          +---------------------                           



                          --+-------------------                           



                          --+-------------------                           



                          ------+| GFR                           



                          (mL/min/1.73 m2) ?|                           



                          With Kidney Damage ?|                           



                          ?Without Kidney                           



                          Damage+---------------                           



                          --------+-------------                           



                          --------+-------------                           



                          ------------+| ?>90 ?                           



                          ? ? ? ? ? ? ? ?|                           



                          ?Stage one ? ? ? ? ?|                           



                          ? Normal ? ? ? ? ? ? ?                           



                          ?+--------------------                           



                          ---+------------------                           



                          ---+------------------                           



                          -------+| ?60-89 ? ? ?                           



                          ? ? ? ? ?| ?Stage two                           



                          ? ? ? ? ?| ? Decreased                           



                          GFR ? ? ? ?                            



                          +---------------------                           



                          --+-------------------                           



                          --+-------------------                           



                          ------+| ?30-59 ? ? ?                           



                          ? ? ? ? ?| ?Stage                           



                          three ? ? ? ?| ? Stage                           



                          three ? ? ? ? ?                           



                          +---------------------                           



                          --+-------------------                           



                          --+-------------------                           



                          ------+| ?15-29 ? ? ?                           



                          ? ? ? ? ?| ?Stage four                           



                          ? ? ? ? | ? Stage four                           



                          ? ? ? ? ?                              



                          ?+--------------------                           



                          ---+------------------                           



                          ---+------------------                           



                          -------+| ?<15 (or                           



                          dialysis) ? ?| ?Stage                           



                          five ? ? ? ? | ? Stage                           



                          five ? ? ? ? ?                           



                          ?+--------------------                           



                          ---+------------------                           



                          ---+------------------                           



                          -------+ *Each stage                           



                          assumes the associated                           



                          GFR level has been in                           



                          effect for at least                           



                          three months. ?Stages                           



                          1 to 5, with or                           



                          without kidney                           



                          disease, indicate                           



                          chronic kidney                           



                          disease. Notes:                           



                          Determination of                           



                          stages one and two                           



                          (with eGFR                             



                          >59mL/min/1.73 m2)                           



                          requires estimation of                           



                          kidney damage for at                           



                          least three months as                           



                          defined by structural                           



                          or functional                           



                          abnormalities of the                           



                          kidney, manifested by                           



                          either:Pathological                           



                          abnormalities or                           



                          Markers of kidney                           



                          damage (including                           



                          abnormalities in the                           



                          composition of the                           



                          blood or urine or                           



                          abnormalities in                           



                          imaging tests).                           

 

             Lab Interpretation Abnormal                               



             (test code = 03487-7)                                        



[facility]





             Test Item    Value        Reference Range Interpretation Comments

 

             LIPASE (test code = 4476792947) 57 U/L       0-220                 

    

 

             Lab Interpretation (test code = Normal                             

    



             08789-2)                                            



[facility]55





             Test Item    Value        Reference Range Interpretation Comments

 

             APPEARANCE (test code = Turbid       Clear        A            



             6076896735)                                         

 

             COLOR (test code = Yellow       Yellow                    



             2698119976)                                         

 

             PH (test code =              4.8-8.0                   



             2150097939)                                         

 

             SP GRAVITY (test code =              1.003-1.030               



             7886782967)                                         

 

             GLU U QUAL (test code = Normal       Normal                    



             7351412663)                                         

 

             BLOOD (test code = 1+           Negative     A            



             5836561265)                                         

 

             KETONES (test code = 20 mg/dL     Negative     A            



             4830397611)                                         

 

             PROTEIN (test code = 100 mg/dL    Negative     A            



             2887-8)                                             

 

             UROBILIN (test code = 2.0 mg/dL    Normal       A            



             8541909411)                                         

 

             BILIRUBIN (test code = Negative     Negative                  



             5685205829)                                         

 

             NITRITE (test code = Positive     Negative     A            



             4716169234)                                         

 

             LEUK FRANCE (test code = 250/uL       Negative     A            



             3709562518)                                         

 

             RBC/HPF (test code =              See_Comment  H             [Autom

ated message]



             0866688255)                                         The system [employer]



                                                                 generated this



                                                                 result transmit

huma



                                                                 reference range

: 0 -



                                                                 3 HPF. The refe

rence



                                                                 range was not u

sed



                                                                 to interpret th

is



                                                                 result as



                                                                 normal/abnormal

.

 

             WBC/HPF (test code = >182         See_Comment  H             [Autom

ated message]



             2022907815)                                         The system [employer]



                                                                 generated this



                                                                 result transmit

huma



                                                                 reference range

: 0 -



                                                                 5 HPF. The refe

rence



                                                                 range was not u

sed



                                                                 to interpret th

is



                                                                 result as



                                                                 normal/abnormal

.

 

             BACTERIA (test code = Many         Negative     A            



             0500806444)                                         

 

             MUCOUS (test code = Moderate     Negative LPF A            



             7796929075)                                         

 

             WBC CLUMPS (test code =              See_Comment  H             [Au

tomated message]



             7492316143)                                         The system [employer]



                                                                 generated this



                                                                 result transmit

huma



                                                                 reference range

: <=1



                                                                 HPF. The refere

nce



                                                                 range was not u

sed



                                                                 to interpret th

is



                                                                 result as



                                                                 normal/abnormal

.

 

             Lab Interpretation (test Abnormal                               



             code = 32861-6)                                        



[facility] WITH DIFF2021-05-10 00:46:14





             Test Item    Value        Reference Range Interpretation Comments

 

             WBC (test code =              See_Comment                [Automated



             9490-2)                                             message] The sy

stem



                                                                 which generated



                                                                 this result



                                                                 transmitted



                                                                 reference range

:



                                                                 4.20 - 10.70



                                                                 10*3/?L. The



                                                                 reference range

 was



                                                                 not used to



                                                                 interpret this



                                                                 result as



                                                                 normal/abnormal

.

 

             RBC (test code =              See_Comment                [Automated



             929-8)                                              message] The sy

stem



                                                                 which generated



                                                                 this result



                                                                 transmitted



                                                                 reference range

:



                                                                 4.26 - 5.52



                                                                 10*6/?L. The



                                                                 reference range

 was



                                                                 not used to



                                                                 interpret this



                                                                 result as



                                                                 normal/abnormal

.

 

             HGB (test code = 15.9 g/dL    12.2-16.4                 



             718-7)                                              

 

             HCT (test code = 47.1 %       38.4-49.3                 



             4544-3)                                             

 

             MCV (test code = 86.6 fL      81.7-95.6                 



             787-2)                                              

 

             MCH (test code = 29.2 pg      26.1-32.7                 



             785-6)                                              

 

             MCHC (test code = 33.8 g/dL    31.2-35.0                 



             786-4)                                              

 

             RDW-SD (test code = 47.6 fL      38.5-51.6                 



             36326-3)                                            

 

             RDW-CV (test code = 15.2 %       12.1-15.4                 



             788-0)                                              

 

             PLT (test code =              See_Comment  H             [Automated



             777-3)                                              message] The sy

stem



                                                                 which generated



                                                                 this result



                                                                 transmitted



                                                                 reference range

:



                                                                 150 - 328 10*3/

?L.



                                                                 The reference r

zhen



                                                                 was not used to



                                                                 interpret this



                                                                 result as



                                                                 normal/abnormal

.

 

             MPV (test code = 9.4 fL       9.8-13.0     L            



             34467-4)                                            

 

             NRBC/100 WBC (test              See_Comment                [Automat

ed



             code = 6665502132)                                        message] 

The system



                                                                 which generated



                                                                 this result



                                                                 transmitted



                                                                 reference range

:



                                                                 0.0 - 10.0 /100



                                                                 WBCs. The refer

ence



                                                                 range was not u

sed



                                                                 to interpret th

is



                                                                 result as



                                                                 normal/abnormal

.

 

             NRBC x10^3 (test code <0.01        See_Comment                [Auto

mated



             = [id number])                                        message] The s

ystem



                                                                 which generated



                                                                 this result



                                                                 transmitted



                                                                 reference range

:



                                                                 10*3/?L. The



                                                                 reference range

 was



                                                                 not used to



                                                                 interpret this



                                                                 result as



                                                                 normal/abnormal

.

 

             GRAN MAT (NEUT) % 61.3 %                                 



             (test code = 770-8)                                        

 

             IMM GRAN % (test code 0.70 %                                 



             = [id number])                                        

 

             LYMPH % (test code = 26.4 %                                 



             736-9)                                              

 

             MONO % (test code = 9.9 %                                  



             5905-5)                                             

 

             EOS % (test code = 1.3 %                                  



             713-8)                                              

 

             BASO % (test code = 0.4 %                                  



             706-2)                                              

 

             GRAN MAT x10^3(ANC) 6.39 10*3/uL 1.99-6.95                 



             (test code =                                        



             0071538263)                                         

 

             IMM GRAN x10^3 (test 0.07 10*3/uL 0.00-0.06    H            



             code = [id number])                                        

 

             LYMPH x10^3 (test code 2.75 10*3/uL 1.09-3.23                 



             = 731-0)                                            

 

             MONO x10^3 (test code 1.03 10*3/uL 0.36-1.02    H            



             = 742-7)                                            

 

             EOS x10^3 (test code = 0.14 10*3/uL 0.06-0.53                 



             711-2)                                              

 

             BASO x10^3 (test code 0.04 10*3/uL 0.01-0.09                 



             = 704-7)                                            

 

             Lab Interpretation Abnormal                               



             (test code = 14234-3)                                        



[id number] 13:03:00





             Test Item    Value        Reference Range Interpretation Comments

 

             POC-GLUCOSE METER 140 mg/dL           H            : TESTED A

T [facility] [address]



             (BEAKER) (test code =                                        [place],



             [address]38)                                               [address]:



                                                                 /Techni

christina ID



                                                                 = [id number] for ANANT CATES



POCT-GLUCOSE [id number] 09:15:00





             Test Item    Value        Reference Range Interpretation Comments

 

             POC-GLUCOSE METER 142 mg/dL           H            : TESTED A

T [facility] [address]



             (BEAKER) (test code =                                        [facility],



             [facility]8)                                               [address]:



                                                                 /Techni

christina ID



                                                                 = [id number] for ANANT CATES



POCT-GLUCOSE METER2020-01-15 20:56:00





             Test Item    Value        Reference Range Interpretation Comments

 

             POC-GLUCOSE METER 134 mg/dL           H            : TESTED A

T [facility] [address]



             (BEAKER) (test code =                                        [facility],



             [facility]8)                                               [address]:



                                                                 /Techni

christina ID



                                                                 = [id number] for SHERRILL GUARDADO



POCT-GLUCOSE METER2020-01-15 16:46:00





             Test Item    Value        Reference Range Interpretation Comments

 

             POC-GLUCOSE METER 91 mg/dL                         : TESTED A

T [facility] [address]



             (BEAKER) (test code =                                        [facility],



             [facility]8)                                               [address]:



                                                                 /Techni

chritsina ID =



                                                                 [id number] for ANANT HAMILTON



POCT-GLUCOSE METER2020-01-15 12:19:00





             Test Item    Value        Reference Range Interpretation Comments

 

             POC-GLUCOSE METER 237 mg/dL           H            : TESTED A

T [facility] [address]



             (BEAKER) (test code =                                        [facility],



             [facility])                                               [address]:



                                                                 /Techni

christina ID



                                                                 = [id number] for ANANT CATES



MR, EXTREMITY, LOWER, WITHOUT CONTRAST, RIGHT2020-01-15 09:12:00FINAL REPORT 
PATIENT ID: [id number] MRI of the right and left hindfoot without intravenous 
contrast History: Osteomyelitis, diabetic foot Comparison: Radiograph dated 
2020 Technique: Multiplanar multisequence MRI of the right and left 
hindfoot was performed without intravenous contrast using the hindfoot 
osteomyelitis protocol Findings: Right foot: Marked T1 and T2 hypointense soft 
tissue thickening is noted at the medial aspect of the right heel, likely to 
reflect scar tissue. There is also mild subcutaneous edema at the lateral aspect
of the mid foot and forefoot, incompletely imaged. Nodiscrete drainable fluid 
collection is identified. There is no marrow signal abnormality to suggest o
steomyelitis. There is a small nonspecific joint effusion at the ankle and 
subtalar joint. Scattereddegenerative changes are noted. There is marked fatty 
atrophy of the distal leg and foot musculature. Severe flexor hallucis longus 
tendinopathy. No tenosynovitis. Left foot: There is a large area of soft tissue 
defect and granulation tissue at the posteromedial aspect of the heel, reaching 
the calcaneus, but there is no drainable fluid collection. Deformity is noted in
the hindfoot, and the forefootappears adducted. No acute fracture. No marrow 
signal abnormality is identified to indicate acute osteomyelitis. There is no 
significant joint effusion. There is severe atrophy of the foot and distal leg 
musculature. No significant tenosynovitis identified. IMPRESSION: 
Granulation/scar tissue at the medial aspect of the heel bilaterally. No MR 
evidence of osteomyelitis. Severe muscle atrophy. Signed:Nguyen Cornejo 
Verified Date/Time: 01/15/2020 09:12:01 Reading Location: [address] Ortho 
Consult Reading Room Electronically signed by: NGUYEN CORNEJO M.D. on 01/15/2020 
09:12 AMMR, EXTREMITY, LOWER, WITHOUT CONTRAST, LEFT2020-01-15 09:12:00FINAL 
REPORT PATIENT ID: [id number] MRI of the right and left hindfoot without 
intravenous contrast History: Osteomyelitis, diabetic foot Comparison: 
Radiograph dated 2020 Technique: Multiplanar multisequence MRI of the
right and left hindfoot was performed without intravenous contrast using the 
hindfoot osteomyelitis protocol Findings: Right foot: Marked T1 and T2 
hypointense soft tissue thickening is noted at the medial aspect of the right 
heel, likely to reflect scar tissue. There is also mild subcutaneous edema at 
the lateral aspect of the mid foot and forefoot, incompletely imaged. Nodiscrete
drainable fluid collection is identified. There is no marrow signal abnormality 
to suggest osteomyelitis. There is a small nonspecific joint effusion at the 
ankle and subtalar joint. Scattereddegenerative changes are noted. There is 
marked fatty atrophy of the distal leg and foot musculature. Severe flexor 
hallucis longus tendinopathy. No tenosynovitis. Left foot: There is a large area
of soft tissue defect and granulation tissue at the posteromedial aspect of the 
heel, reaching the calcaneus, but there is no drainable fluid collection. 
Deformity is noted in the hindfoot, and the forefootappears adducted. No acute 
fracture. No marrow signal abnormality is identified to indicate acute ost
eomyelitis. There is no significant joint effusion. There is severe atrophy of 
the foot and distal leg musculature. No significant tenosynovitis identified. 
IMPRESSION: Granulation/scar tissue at the medial aspect of the heel 
bilaterally. No MR evidence of osteomyelitis. Severe muscle atrophy. Signed:Nguyen Cornejo Verified Date/Time: 01/15/2020 09:12:01 Reading Location: [facility] 
C013X Ortho Consult Reading Room  Electronically signed by: NGUYEN CORNEJO M.D. on 
01/15/2020 09:12 AMPOCT-GLUCOSE METER2020-01-15 08:47:00





             Test Item    Value        Reference Range Interpretation Comments

 

             POC-GLUCOSE METER 264 mg/dL           H            : TESTED A

T [facility] [address]



             (BEAKER) (test code =                                        [facility],



             [address])                                               [address]:



                                                                 /Techni

christina ID



                                                                 = [id number] for AMAYA PARHAME



                                                                 ANANT



ISLET CELL AB SCR2020-01-15 07:43:00





             Test Item    Value        Reference Range Interpretation Comments

 

             ISLET CELL AB Refer to individual                           



             AUTOVERIFICATION (test Islet Cell Ab                           



             code = 2556) and/or Islet Cell                           



                          Ab Titer results.                           



POCT-GLUCOSE [id number] 21:27:00





             Test Item    Value        Reference Range Interpretation Comments

 

             POC-GLUCOSE METER 130 mg/dL           H            : TESTED A

T [facility] [address]



             (BEAKER) (test code =                                        [facility],



             [address])                                               [address]:



                                                                 /Techni

christina ID



                                                                 = [id number] for KRANTHI PIERRE



BLOOD [id number] 13:00:00





             Test Item    Value        Reference Range Interpretation Comments

 

             CULTURE (BEAKER) (test No growth in 5 days                         

  



             code = 1095)                                        



BLOOD [id number] 13:00:00





             Test Item    Value        Reference Range Interpretation Comments

 

             CULTURE (BEAKER) (test No growth in 5 days                         

  



             code = 1095)                                        



POCT-GLUCOSE [id number] 12:57:00





             Test Item    Value        Reference Range Interpretation Comments

 

             POC-GLUCOSE METER 138 mg/dL           H            : TESTED A

T [facility] [address]



             (BEAKER) (test code =                                        [facility],



             [address]38)                                               [address]:



                                                                 /Techni

christina ID



                                                                 = [id number] for [place]

LLIS,



                                                                 BARBARA



POCT-GLUCOSE [id number] 08:43:00





             Test Item    Value        Reference Range Interpretation Comments

 

             POC-GLUCOSE METER 226 mg/dL           H            : TESTED A

T [facility] [address]



             ([facility]) (test code =                                        [facility],



             153)                                               [address]:



                                                                 /Techni

christina ID



                                                                 = [id number] for [place]

LLIS,



                                                                 BARBARA



POCT-GLUCOSE [id number] 21:11:00





             Test Item    Value        Reference Range Interpretation Comments

 

             POC-GLUCOSE METER 254 mg/dL           H            : Notified

 RN/MD:



             ([facility]) (test code =                                        TESTED

 AT [facility] [address]



             [address])                                               [facility],



                                                                 [address]:



                                                                 /Techni

christina ID



                                                                 = [id number] for KRANTHI PIERRE



POCT-GLUCOSE [id number] 21:02:00





             Test Item    Value        Reference Range Interpretation Comments

 

             POC-GLUCOSE METER 177 mg/dL           H            : TESTED A

[facility]



             ([facility]) (test code =                                        [facility],



             153)                                               [address]:



                                                                 /Techni

christina ID



                                                                 = [id number] for [place]

LLIS,



                                                                 BARBARA



POCT-GLUCOSE [id number] 12:31:00





             Test Item    Value        Reference Range Interpretation Comments

 

             POC-GLUCOSE METER 215 mg/dL           H            : TESTED A

[facility]



             ([facility]) (test code =                                        [facility],



             153)                                               [address]:



                                                                 /Techni

christina ID



                                                                 = [id number] for [place]

LLIS,



                                                                 BARBARA



WOUND CULTURE + GRAM [id number] 10:10:00





             Test Item    Value        Reference    Interpretation Comments



                                       Range                     

 

             CULTURE ([facility]) PROTEUS MIRABILIS              A            1+ Pro

teus



             (test code = 1095)                                        mirabilis

 

             Amikacin (test code =                           S            



             1)                                                  

 

             Ampicillin +                           S            



             Sulbactam (test code                                        



             = 6)                                                

 

             Aztreonam (test code                           S            



             = 32)                                               

 

             Cefepime (test code =                           S            



             51)                                                 

 

             Cefoxitin (test code                           R            



             = 68)                                               

 

             Ceftazidime (test                           S            



             code = 27)                                          

 

             Ceftriaxone (test                           S            



             code = 52)                                          

 

             Ertapenem (test code                           S            



             = 38)                                               

 

             Gentamicin (test code                           S            



             = 18)                                               

 

             Levofloxacin (test                           R            



             code = 22)                                          

 

             Meropenem (test code                           S            



             = 34)                                               

 

             Piperacillin +                           S            



             Tazobactam (test code                                        



             = 29)                                               

 

             Tetracycline (test                           R            



             code = 2)                                           

 

             Tobramycin (test code                           S            



             = 25)                                               

 

             Trimethoprim +                           R            



             Sulfamethoxazole                                        



             (test code = 47)                                        

 

             CULTURE (BEAKER) METHICILLIN               A            1+ Methicil

carine



             (test code = 1095) RESISTANT                              resistant



                          STAPHYLOCOCCUS                           Staphylococcu

s



                          AUREUS                                 aureus

 

             Clindamycin (test                           R            



             code = 10)                                          

 

             Erythromycin (test                           R            



             code = 4)                                           

 

             Linezolid (test code                           S            



             = 40)                                               

 

             Nitrofurantoin (test                           S            



             code = 23)                                          

 

             Oxacillin (test code                           R            



             = 14)                                               

 

             Rifampin (test code =                           S            



             43)                                                 

 

             Tetracycline (test                           S            



             code = 2)                                           

 

             Trimethoprim +                           S            



             Sulfamethoxazole                                        



             (test code = 47)                                        

 

             Vancomycin (test code                           S            



             = 13)                                               

 

             CULTURE (BEAKER) ESCHERICHIA COLI              A            <1+ Esc

herichia



             (test code = 1095)                                        coliESBL 

Positive

 

             Amikacin (test code =                           S            



             1)                                                  

 

             Ampicillin +                           R            



             Sulbactam (test code                                        



             = 6)                                                

 

             Aztreonam (test code                           R            



             = 32)                                               

 

             Cefepime (test code =                           R            



             51)                                                 

 

             Cefoxitin (test code                           R            



             = 68)                                               

 

             Ceftazidime (test                           R            



             code = 27)                                          

 

             Ceftriaxone (test                           R            



             code = 52)                                          

 

             Ertapenem (test code                           S            



             = 38)                                               

 

             Gentamicin (test code                           S            



             = 18)                                               

 

             Levofloxacin (test                           R            



             code = 22)                                          

 

             Meropenem (test code                           S            



             = 34)                                               

 

             Nitrofurantoin (test                           S            



             code = 23)                                          

 

             Piperacillin +                           R            



             Tazobactam (test code                                        



             = 29)                                               

 

             Tetracycline (test                           S            



             code = 2)                                           

 

             Tobramycin (test code                           S            



             = 25)                                               

 

             Trimethoprim +                           R            



             Sulfamethoxazole                                        



             (test code = 47)                                        

 

             CULTURE (BEAKER)                           A            Beta-hemoly

tic



             (test code = 1095)                                        streptoco

ccus



                                                                 group B, by



                                                                 serological



                                                                 grouping

 

             GRAM STAIN RESULT 3+ WBCs                                



             (BEAKER) (test code =                                        



             1123)                                               

 

             GRAM STAIN RESULT 1+ gram negative                           



             (BEAKER) (test code = rods                                   



             153605)                                             

 

             GRAM STAIN RESULT 2+ gram positive                           



             (BEAKER) (test code = rods                                   



             551112)                                             

 

             GRAM STAIN RESULT <1+ gram negative                           



             (BEAKER) (test code = coccobacilli                           



             025554)                                             

 

             GRAM STAIN RESULT 2+ gram positive                           



             (BEAKER) (test code = cocci in pairs                           



             591478)                                             



POCT-GLUCOSE [id number] 08:52:00





             Test Item    Value        Reference Range Interpretation Comments

 

             POC-GLUCOSE METER 209 mg/dL           H            : TESTED A

T [facility] [address]



             (BEAKER) (test code =                                        [facility],



             153)                                               [address]:



                                                                 /Techni

christina ID



                                                                 = [id number] for ADRIEL HARKINSE



POCT-GLUCOSE [id number] 21:26:00





             Test Item    Value        Reference Range Interpretation Comments

 

             POC-GLUCOSE METER 255 mg/dL           H            : TESTED A

T [facility] [address]



             (BEAKER) (test code =                                        [facility],



             [facility])                                               [address]:



                                                                 /Techni

christina ID



                                                                 = [id number] for CR

ISWELL,



                                                                 TIA



POCT-GLUCOSE [id number] 17:34:00





             Test Item    Value        Reference Range Interpretation Comments

 

             POC-GLUCOSE METER 227 mg/dL           H            : TESTED A

T [facility] [address]



             (BEAKER) (test code =                                        [facility],



             [facility])                                               [address]:



                                                                 /Techni

christina ID



                                                                 = [id number] for WASSERMAN

NNY,



                                                                 NAKITA



POCT-GLUCOSE [id number] 11:28:00





             Test Item    Value        Reference Range Interpretation Comments

 

             POC-GLUCOSE METER 282 mg/dL           H            : TESTED A

T [facility] [address]



             (BEAKER) (test code =                                        [facility],



             [facility])                                               [address]:



                                                                 /Techni

christina ID



                                                                 = [id number] for WASSERMAN

NNY,



                                                                 NAKITA



POCT-GLUCOSE [id number] 08:19:00





             Test Item    Value        Reference Range Interpretation Comments

 

             POC-GLUCOSE METER 329 mg/dL           H            : TESTED A

T [facility] [address]



             (BEAKER) (test code =                                        [facility],



             [facility])                                               [address]:



                                                                 /Techni

christina ID



                                                                 = [id number] for ANANT CATES



POCT-GLUCOSE [id number] 21:32:00





             Test Item    Value        Reference Range Interpretation Comments

 

             POC-GLUCOSE METER 275 mg/dL           H            : TESTED A

T [facility] [address]



             (BEAKER) (test code =                                        [facility],



             [facility])                                               [address]:



                                                                 /Techni

christina ID



                                                                 = [id number] for CR

ISWELL,



                                                                 TIA



POCT-GLUCOSE [id number] 17:47:00





             Test Item    Value        Reference Range Interpretation Comments

 

             POC-GLUCOSE METER 304 mg/dL           H            : TESTED A

T [facility] [address]



             (BEAKER) (test code =                                        [facility],



             1538)                                               [address]:



                                                                 /Techni

christina ID



                                                                 = [id number] for LUIZA FIGUEROA



URINE [id number] 15:21:00





             Test Item    Value        Reference Range Interpretation Comments

 

             CULTURE (BEAKER)                           A            >100,000 co

l/mL



             (test code = 1095)                                        Beta-hemo

lytic



                                                                 streptococcus g

roup



                                                                 B, by serologic

al



                                                                 grouping

 

             CULTURE (BEAKER) PROTEUS                   A            80-89,000 c

ol/mL



             (test code = 1095) MIRABILIS                              Proteus



                                                                 mirabilisESBL



                                                                 Positive

 

             Amikacin (test code =                           S            



             1)                                                  

 

             Ampicillin +                           R            



             Sulbactam (test code                                        



             = 6)                                                

 

             Aztreonam (test code                           R            



             = 32)                                               

 

             Cefepime (test code =                           R            



             51)                                                 

 

             Cefoxitin (test code                           R            



             = 68)                                               

 

             Ceftazidime (test                           R            



             code = 27)                                          

 

             Ceftriaxone (test                           R            



             code = 52)                                          

 

             Ertapenem (test code                           S            



             = 38)                                               

 

             Gentamicin (test code                           R            



             = 18)                                               

 

             Levofloxacin (test                           R            



             code = 22)                                          

 

             Meropenem (test code                           S            



             = 34)                                               

 

             Nitrofurantoin (test                           R            



             code = 23)                                          

 

             Tetracycline (test                           R            



             code = 2)                                           

 

             Tobramycin (test code                           R            



             = 25)                                               



POCT-GLUCOSE [id number] 12:16:00





             Test Item    Value        Reference Range Interpretation Comments

 

             POC-GLUCOSE METER 337 mg/dL           H            : TESTED A

T [facility] [address]



             (BEAKER) (test code =                                        [facility],



             1538)                                               [address]:



                                                                 /Techni

christina ID



                                                                 = [id number] for LUIZA FIGUEROA



BASIC METABOLIC [id number] 10:39:00





             Test Item    Value        Reference Range Interpretation Comments

 

             SODIUM (BEAKER) 134 meq/L    136-145      L            



             (test code = 381)                                        

 

             POTASSIUM (BEAKER) 4.6 meq/L    3.5-5.1                   



             (test code = 379)                                        

 

             CHLORIDE (BEAKER) 105 meq/L                        



             (test code = 382)                                        

 

             CO2 (BEAKER) (test 20 meq/L     22-29        L            



             code = 355)                                         

 

             BLOOD UREA NITROGEN 14 mg/dL     7-21                      



             (BEAKER) (test code                                        



             = 354)                                              

 

             CREATININE (BEAKER) 0.97 mg/dL   0.57-1.25                 



             (test code = 358)                                        

 

             GLUCOSE RANDOM 429 mg/dL           HH           



             (BEAKER) (test code                                        



             = 652)                                              

 

             CALCIUM (BEAKER) 8.9 mg/dL    8.4-10.2                  



             (test code = 697)                                        

 

             EGFR (BEAKER) (test 107 mL/min/1.73                           ESTIM

ATED GFR IS



             code = 1092) sq m                                   NOT AS ACCURATE

 AS



                                                                 CREATININE



                                                                 CLEARANCE IN



                                                                 PREDICTING



                                                                 GLOMERULAR



                                                                 FILTRATION RATE

.



                                                                 ESTIMATED GFR I

S



                                                                 NOT APPLICABLE 

FOR



                                                                 DIALYSIS PATIEN

TS.



 ID - MAGAN FPOCT-GLUCOSE [id number] 08:29:00





             Test Item    Value        Reference Range Interpretation Comments

 

             POC-GLUCOSE METER 395 mg/dL           H            : TESTED A

T [facility] [address]



             (BEAKER) (test code =                                        [place],



             [address]38)                                               [address]:



                                                                 /Techni

christina ID



                                                                 = [id number] for LUIZA FIGUEROA



CBC W/PLT COUNT &amp; AUTO [id number] 06:40:00





             Test Item    Value        Reference Range Interpretation Comments

 

             WHITE BLOOD CELL COUNT (BEAKER) 7.2 K/ L     3.5-10.5              

    



             (test code = 775)                                        

 

             RED BLOOD CELL COUNT (BEAKER) 5.48 M/ L    4.63-6.08               

  



             (test code = 761)                                        

 

             HEMOGLOBIN (BEAKER) (test code = 15.1 GM/DL   13.7-17.5            

     



             410)                                                

 

             HEMATOCRIT (BEAKER) (test code = 46.4 %       40.1-51.0            

     



             411)                                                

 

             MEAN CORPUSCULAR VOLUME (BEAKER) 84.7 fL      79.0-92.2            

     



             (test code = 753)                                        

 

             MEAN CORPUSCULAR HEMOGLOBIN 27.6 pg      25.7-32.2                 



             (BEAKER) (test code = 751)                                        

 

             MEAN CORPUSCULAR HEMOGLOBIN CONC 32.5 GM/DL   32.3-36.5            

     



             (BEAKER) (test code = 752)                                        

 

             RED CELL DISTRIBUTION WIDTH 15.5 %       11.6-14.4    H            



             (BEAKER) (test code = 412)                                        

 

             PLATELET COUNT (BEAKER) (test 315 K/CU MM  150-450                 

  



             code = 756)                                         

 

             MEAN PLATELET VOLUME (BEAKER) 10.5 fL      9.4-12.4                

  



             (test code = 754)                                        

 

             NUCLEATED RED BLOOD CELLS 0 /100 WBC   0-0                       



             (BEAKER) (test code = 413)                                        

 

             NEUTROPHILS RELATIVE PERCENT 62 %                                  

 



             (BEAKER) (test code = 429)                                        

 

             LYMPHOCYTES RELATIVE PERCENT 26 %                                  

 



             (BEAKER) (test code = 430)                                        

 

             MONOCYTES RELATIVE PERCENT 9 %                                    



             (BEAKER) (test code = 431)                                        

 

             EOSINOPHILS RELATIVE PERCENT 2 %                                   

 



             (BEAKER) (test code = 432)                                        

 

             BASOPHILS RELATIVE PERCENT 0 %                                    



             (BEAKER) (test code = 437)                                        

 

             NEUTROPHILS ABSOLUTE COUNT 4.48 K/ L    1.78-5.38                 



             (BEAKER) (test code = 670)                                        

 

             LYMPHOCYTES ABSOLUTE COUNT 1.87 K/ L    1.32-3.57                 



             (BEAKER) (test code = 414)                                        

 

             MONOCYTES ABSOLUTE COUNT (BEAKER) 0.62 K/ L    0.30-0.82           

      



             (test code = 415)                                        

 

             EOSINOPHILS ABSOLUTE COUNT 0.17 K/ L    0.04-0.54                 



             (BEAKER) (test code = 416)                                        

 

             BASOPHILS ABSOLUTE COUNT (BEAKER) 0.03 K/ L    0.01-0.08           

      



             (test code = 417)                                        

 

             IMMATURE GRANULOCYTES-RELATIVE 1 %          0-1                    

   



             PERCENT (BEAKER) (test code =                                      

  



             2801)                                               



POCT-GLUCOSE METER2020-01-10 19:55:00





             Test Item    Value        Reference Range Interpretation Comments

 

             POC-GLUCOSE METER 369 mg/dL           H            : TESTED A

T [facility] [address]



             (BEAKER) (test code =                                        [facility],



             153)                                               [address]:



                                                                 /Techni

christina ID



                                                                 = [id number] for CR

RADHA



                                                                 TIA



POCT-GLUCOSE METER2020-01-10 16:45:00





             Test Item    Value        Reference Range Interpretation Comments

 

             POC-GLUCOSE METER 272 mg/dL           H            : TESTED A

T [facility] [address]



             (BEAKER) (test code =                                        [facility],



             153)                                               [address]:



                                                                 /Techni

christina ID



                                                                 = [id number] for TH

OMAS,



                                                                 SARAH



POCT-GLUCOSE METER2020-01-10 11:40:00





             Test Item    Value        Reference Range Interpretation Comments

 

             POC-GLUCOSE METER 277 mg/dL           H            : TESTED A

T [facility] [address]



             (BEAKER) (test code =                                        [facility],



             153)                                               [address]:



                                                                 /Techni

christina ID



                                                                 = [id number] for TH

OMAS,



                                                                 SARAH



BASIC METABOLIC PANEL2020-01-10 10:22:00





             Test Item    Value        Reference Range Interpretation Comments

 

             SODIUM (BEAKER) 132 meq/L    136-145      L            



             (test code = 381)                                        

 

             POTASSIUM (BEAKER) 4.4 meq/L    3.5-5.1                   



             (test code = 379)                                        

 

             CHLORIDE (BEAKER) 103 meq/L                        



             (test code = 382)                                        

 

             CO2 (BEAKER) (test 21 meq/L     22-29        L            



             code = 355)                                         

 

             BLOOD UREA NITROGEN 14 mg/dL     7-21                      



             (BEAKER) (test code                                        



             = 354)                                              

 

             CREATININE (BEAKER) 0.89 mg/dL   0.57-1.25                 



             (test code = 358)                                        

 

             GLUCOSE RANDOM 428 mg/dL           HH           



             (BEAKER) (test code                                        



             = 652)                                              

 

             CALCIUM (BEAKER) 9.2 mg/dL    8.4-10.2                  



             (test code = 697)                                        

 

             EGFR (BEAKER) (test 119 mL/min/1.73                           ESTIM

ATED GFR IS



             code = 1092) sq m                                   NOT AS ACCURATE

 AS



                                                                 CREATININE



                                                                 CLEARANCE IN



                                                                 PREDICTING



                                                                 GLOMERULAR



                                                                 FILTRATION RATE

.



                                                                 ESTIMATED GFR I

S



                                                                 NOT APPLICABLE 

FOR



                                                                 DIALYSIS PATIBIRGIT

TS.



 ID - NTPLIPID PANEL2020-01-10 10:17:00





             Test Item    Value        Reference Range Interpretation Comments

 

             TRIGLYCERIDES (BEAKER) (test code = 692 mg/dL                      

        



             540)                                                

 

             CHOLESTEROL (BEAKER) (test code = 196 mg/dL                        

      



             631)                                                

 

             HDL CHOLESTEROL (BEAKER) (test code 24 mg/dL                       

        



             = 976)                                              



Calculated LDL not valid if triglyceride &gt;400 mg/dLTriglyceride Reference 
Range: Low Risk &lt;150Borderline 150-199 High Risk 200-499 Very High Risk 
&gt;=500Cholesterol Reference Range: Low Risk &lt;200 Borderline 200-239 High 
Risk &gt;240HDL Cholesterol Reference Range: Low Risk &gt;=60 High Risk&lt;40LDL
Cholesterol Reference Range: Optimal &lt;100 Near Optimal 100-129 Borderline 
130-159 High 160-189 Very High &gt;=190  ID - NTPPOCT-GLUCOSE METER
2020-01-10 08:28:00





             Test Item    Value        Reference Range Interpretation Comments

 

             POC-GLUCOSE METER 388 mg/dL           H            : TESTED SALOME ARREOLA [facility] [address]



             (BEAKER) (test code =                                        [place],



             1538)                                               [address]:



                                                                 /Techni

christina ID



                                                                 = [id number] for AMAYA ACOSTA



                                                                 ANANT



HEMOGLOBIN A1C2020-01-10 07:54:00





             Test Item    Value        Reference Range Interpretation Comments

 

             HEMOGLOBIN A1C (BEAKER) (test code = 10.4 %       4.3-6.1      H   

         



             368)                                                



CBC W/PLT COUNT &amp; AUTO DIFFERENTIAL2020-01-10 05:56:00





             Test Item    Value        Reference Range Interpretation Comments

 

             WHITE BLOOD CELL COUNT (BEAKER) 6.5 K/ L     3.5-10.5              

    



             (test code = 775)                                        

 

             RED BLOOD CELL COUNT (BEAKER) 5.21 M/ L    4.63-6.08               

  



             (test code = 761)                                        

 

             HEMOGLOBIN (BEAKER) (test code = 14.6 GM/DL   13.7-17.5            

     



             410)                                                

 

             HEMATOCRIT (BEAKER) (test code = 44.3 %       40.1-51.0            

     



             411)                                                

 

             MEAN CORPUSCULAR VOLUME (BEAKER) 85.0 fL      79.0-92.2            

     



             (test code = 753)                                        

 

             MEAN CORPUSCULAR HEMOGLOBIN 28.0 pg      25.7-32.2                 



             (BEAKER) (test code = 751)                                        

 

             MEAN CORPUSCULAR HEMOGLOBIN CONC 33.0 GM/DL   32.3-36.5            

     



             (BEAKER) (test code = 752)                                        

 

             RED CELL DISTRIBUTION WIDTH 15.6 %       11.6-14.4    H            



             (BEAKER) (test code = 412)                                        

 

             PLATELET COUNT (BEAKER) (test 294 K/CU MM  150-450                 

  



             code = 756)                                         

 

             MEAN PLATELET VOLUME (BEAKER) 11.2 fL      9.4-12.4                

  



             (test code = 754)                                        

 

             NUCLEATED RED BLOOD CELLS 0 /100 WBC   0-0                       



             (BEAKER) (test code = 413)                                        

 

             NEUTROPHILS RELATIVE PERCENT 56 %                                  

 



             (BEAKER) (test code = 429)                                        

 

             LYMPHOCYTES RELATIVE PERCENT 31 %                                  

 



             (BEAKER) (test code = 430)                                        

 

             MONOCYTES RELATIVE PERCENT 10 %                                   



             (BEAKER) (test code = 431)                                        

 

             EOSINOPHILS RELATIVE PERCENT 2 %                                   

 



             (BEAKER) (test code = 432)                                        

 

             BASOPHILS RELATIVE PERCENT 1 %                                    



             (BEAKER) (test code = 437)                                        

 

             NEUTROPHILS ABSOLUTE COUNT 3.66 K/ L    1.78-5.38                 



             (BEAKER) (test code = 670)                                        

 

             LYMPHOCYTES ABSOLUTE COUNT 2.03 K/ L    1.32-3.57                 



             (BEAKER) (test code = 414)                                        

 

             MONOCYTES ABSOLUTE COUNT (BEAKER) 0.63 K/ L    0.30-0.82           

      



             (test code = 415)                                        

 

             EOSINOPHILS ABSOLUTE COUNT 0.15 K/ L    0.04-0.54                 



             ([facility]) (test code = 416)                                        

 

             BASOPHILS ABSOLUTE COUNT ([facility]) 0.03 K/ L    0.01-0.08           

      



             (test code = 417)                                        

 

             IMMATURE GRANULOCYTES-RELATIVE 1 %          0-1                    

   



             PERCENT ([facility]) (test code =                                      

  



             2801)                                               



POCT-GLUCOSE [id number] 23:47:00





             Test Item    Value        Reference Range Interpretation Comments

 

             POC-GLUCOSE METER 359 mg/dL           H            : Notified

 RN/MD:



             ([facility]) (test code =                                        TESTED

 AT [place] [address]



             1538)                                               [facility],



                                                                 [address]:



                                                                 /Techni

christina ID



                                                                 = [id number] for



                                                                 LATHBRIDGE, ALESSIA

ICE



POCT-GLUCOSE [id number] 21:13:00





             Test Item    Value        Reference Range Interpretation Comments

 

             POC-GLUCOSE METER 315 mg/dL           H            : TESTED A

[facility]



             ([facility]) (test code =                                        [facility],



             153)                                               [id number]:



                                                                 /Techni

christina ID



                                                                 = [id number] for Sm

ith,



                                                                 Nicki



POCT-GLUCOSE [id number] 21:13:00





             Test Item    Value        Reference Range Interpretation Comments

 

             POC-GLUCOSE METER 331 mg/dL           H            : TESTED A

[facility]



             ([facility]) (test code =                                        [facility],



             153)                                               [id number]:



                                                                 /Techni

christina ID



                                                                 = [id number] for RO

SHIELA,



                                                                 DAVIDECA



POCT-GLUCOSE [id number] 10:30:00





             Test Item    Value        Reference Range Interpretation Comments

 

             POC-GLUCOSE METER 341 mg/dL           H            : TESTED A

[facility]



             ([facility]) (test code =                                        [facility],



             153)                                               [id number]:



                                                                 /Techni

christina ID



                                                                 = [id number] for Sm

ith,



                                                                 Nicki



CBC W/PLT COUNT &amp; AUTO [id number] 08:48:00





             Test Item    Value        Reference Range Interpretation Comments

 

             WHITE BLOOD CELL COUNT ([facility]) 6.7 K/ L     3.5-10.5              

    



             (test code = 775)                                        

 

             RED BLOOD CELL COUNT ([facility]) 5.28 M/ L    4.63-6.08               

  



             (test code = 761)                                        

 

             HEMOGLOBIN ([facility]) (test code = 14.6 GM/DL   13.7-17.5            

     



             410)                                                

 

             HEMATOCRIT (BEAKER) (test code = 44.9 %       40.1-51.0            

     



             411)                                                

 

             MEAN CORPUSCULAR VOLUME (BEAKER) 85.0 fL      79.0-92.2            

     



             (test code = 753)                                        

 

             MEAN CORPUSCULAR HEMOGLOBIN 27.7 pg      25.7-32.2                 



             (BEAKER) (test code = 751)                                        

 

             MEAN CORPUSCULAR HEMOGLOBIN CONC 32.5 GM/DL   32.3-36.5            

     



             (BEAKER) (test code = 752)                                        

 

             RED CELL DISTRIBUTION WIDTH 15.3 %       11.6-14.4    H            



             (BEAKER) (test code = 412)                                        

 

             PLATELET COUNT (BEAKER) (test 300 K/CU MM  150-450                 

  



             code = 756)                                         

 

             MEAN PLATELET VOLUME (BEAKER) 10.4 fL      9.4-12.4                

  



             (test code = 754)                                        

 

             NUCLEATED RED BLOOD CELLS 0 /100 WBC   0-0                       



             (BEAKER) (test code = 413)                                        



(MANUAL DIFFERENTIAL)2020 08:48:00





             Test Item    Value        Reference Range Interpretation Comments

 

             NEUTROPHILS - REL (DIFF) (BEAKER) 69 %                             

      



             (test code = 1359)                                        

 

             LYMPHOCYTES - REL (DIFF) (BEAKER) 25 %                             

      



             (test code = 1360)                                        

 

             MONOCYTES - REL (DIFF) (BEAKER) 3 %                                

    



             (test code = 1361)                                        

 

             EOSINOPHILS - REL (DIFF) (BEAKER) 3 %                              

      



             (test code = 1362)                                        

 

             BASOPHILS - REL (DIFF) (BEAKER) 0 %                                

    



             (test code = 1363)                                        

 

             NEUTROPHILS - ABS (DIFF) (BEAKER) 4.62 K/ L    1.80-8.00           

      



             (test code = 1365)                                        

 

             LYMPHOCYTES - ABS (DIFF) (BEAKER) 1.68 K/ L    1.48-4.50           

      



             (test code = 1366)                                        

 

             MONOCYTES - ABS (DIFF) (BEAKER) 0.20 K/ L    0.00-1.30             

    



             (test code = 1367)                                        

 

             EOSINOPHILS - ABS (DIFF) (BEAKER) 0.20 K/ L    0.00-0.50           

      



             (test code = 1368)                                        

 

             BASOPHILS - ABS (DIFF) (BEAKER) 0.00 K/ L    0.00-0.20             

    



             (test code = 1369)                                        

 

             TOTAL COUNTED (BEAKER) (test code = 100                            

        



             1351)                                               

 

             PLT MORPHOLOGY (BEAKER) (test code Normal                          

       



             = 486)                                              

 

             RBC MORPHOLOGY (BEAKER) (test code Normal                          

       



             = 762)                                              

 

             SMUDGE CELLS (BEAKER) (test code = Present                         

       



             1371)                                               



HEMOGLOBIN [id number] 06:39:00





             Test Item    Value        Reference Range Interpretation Comments

 

             HEMOGLOBIN A1C (BEAKER) (test code = 10.5 %       4.3-6.1      H   

         



             368)                                                



LIPID [id number] 04:57:00





             Test Item    Value        Reference Range Interpretation Comments

 

             TRIGLYCERIDES (BEAKER) 1251 mg/dL                             Speci

men moderately



             (test code = 540)                                        hemolyzed

 

             CHOLESTEROL (BEAKER) 215 mg/dL                              Specime

n moderately



             (test code = 631)                                        hemolyzed

 

             HDL CHOLESTEROL 22 mg/dL                               



             (BEAKER) (test code =                                        



             976)                                                



Calculated LDL not valid if triglyceride &gt;400 mg/dLTriglyceride Reference 
Range: Low Risk &lt;150Borderline 150-199 High Risk 200-499 Very High Risk 
&gt;=500Cholesterol Reference Range: Low Risk &lt;200 Borderline 200-239 High 
Risk &gt;240HDL Cholesterol Reference Range: Low Risk &gt;=60 High Risk&lt;40LDL
Cholesterol Reference Range:  Optimal &lt;100 Near Optimal 100-129 Borderline 
130-159 Vgvg949-170 Very High &gt;=190 Specimen moderately lipemicBASIC 
METABOLIC [id number] 04:56:00





             Test Item    Value        Reference Range Interpretation Comments

 

             SODIUM (BEAKER) 137 meq/L    136-145                   



             (test code = 381)                                        

 

             POTASSIUM (BEAKER) 4.6 meq/L    3.5-5.1                   Specimen 

moderately



             (test code = 379)                                        hemolyzed

 

             CHLORIDE (BEAKER) 106 meq/L                        



             (test code = 382)                                        

 

             CO2 (BEAKER) (test 20 meq/L     22-29        L            



             code = 355)                                         

 

             BLOOD UREA NITROGEN 12 mg/dL     7-21                      



             (BEAKER) (test code                                        



             = 354)                                              

 

             CREATININE (BEAKER) 0.95 mg/dL   0.57-1.25                 Specimen

 moderately



             (test code = 358)                                        hemolyzed

 

             GLUCOSE RANDOM 398 mg/dL           H            



             (BEAKER) (test code                                        



             = 652)                                              

 

             CALCIUM (BEAKER) 8.8 mg/dL    8.4-10.2                  



             (test code = 697)                                        

 

             EGFR (BEAKER) (test 110 mL/min/1.73                           ESTIM

ATED GFR IS



             code = 1092) sq m                                   NOT AS ACCURATE

 AS



                                                                 CREATININE



                                                                 CLEARANCE IN



                                                                 PREDICTING



                                                                 GLOMERULAR



                                                                 FILTRATION RATE

.



                                                                 ESTIMATED GFR I

S



                                                                 NOT APPLICABLE 

FOR



                                                                 DIALYSIS PATIEN

TS.



POCT-GLUCOSE [id number] 21:53:00





             Test Item    Value        Reference Range Interpretation Comments

 

             POC-GLUCOSE METER > mg/dL             HH           : Notified

 RN/MD: TESTED



             (BEAKER) (test code =                                        AT [facility] [address] [facility]



             1538)                                               [facility], [address]

[address]:



                                                                 /Techni

christina ID =



                                                                 [id number] for RODRIGO

ISZECHARIAH



U/S, ABDOMINAL, [id number] 19:54:00Reason for exam:-&gt;Evaluation of  
shunt and for possible cyst or pseudocyst Should this be performed at the 
bedside?-&gt;YesFINAL REPORT PATIENT ID: [id number] Limited abdominal ultrasound. 
CLINICAL HISTORY: Evaluation of  shunt for possible cyst or pseudocyst. 
COMPARISON STUDY: None available. FINDINGS: Sonographic assessment of the 
abdomen was performed assessing for fluid in the region of the patient's shunts.
No fluid collections are seen. However, the study is limited by the patient's 
body habitus. CT scan would be more sensitive. Signed: Brandyn Grewal MDReport 
Verified Date/Time: 2020 19:54:10 Reading Location: [address] Consult 
Reading Room Electronically signed by: BRANDYN GREWAL M.D. on 2020 07:54 
PMPOCT-GLUCOSE [id number] 19:34:00





             Test Item    Value        Reference Range Interpretation Comments

 

             POC-GLUCOSE METER 474 mg/dL           HH           : Notified

 RN/MD:



             (BEAKER) (test code =                                        TESTED

 AT [place] [address]



             1530)                                               [facility],



                                                                 [address]:



                                                                 /Techni

christina ID



                                                                 = [id number] for AK

LONA DUNNE



URINALYSIS W/ REFLEX URINE [id number] 17:48:00





             Test Item    Value        Reference Range Interpretation Comments

 

             COLOR (BEAKER) (test code = 470) Yellow                            

     

 

             CLARITY (BEAKER) (test code = Hazy                                 

  



             469)                                                

 

             SPECIFIC GRAVITY UA (BEAKER) 1.020        1.001-1.035              

 



             (test code = 468)                                        

 

             PH UA (BEAKER) (test code = 467) 6.0          5.0-8.0              

     

 

             PROTEIN UA (BEAKER) (test code = 20 mg/dL     Negative     A       

     



             464)                                                

 

             GLUCOSE UA (BEAKER) (test code = >1000 mg/dL  Negative     A       

     



             365)                                                

 

             KETONES UA (BEAKER) (test code = 40 mg/dL     Negative     A       

     



             371)                                                

 

             BILIRUBIN UA (BEAKER) (test code Negative     Negative             

     



             = 462)                                              

 

             BLOOD UA (BEAKER) (test code = Moderate     Negative     A         

   



             461)                                                

 

             NITRITE UA (BEAKER) (test code = Positive     Negative     A       

     



             465)                                                

 

             LEUKOCYTE ESTERASE UA (BEAKER) Large        Negative     A         

   



             (test code = 466)                                        

 

             UROBILINOGEN UA (BEAKER) (test 0.2 mg/dL    0.2-1.0                

   



             code = 463)                                         

 

             RBC UA (BEAKER) (test code = 519) 0 /HPF                           

      

 

             WBC UA (BEAKER) (test code = 520) 267 /HPF                         

      

 

             BACTERIA (BEAKER) (test code = Moderate                            

   



             517)                                                

 

             SOURCE(BEAKER) (test code = 8660)                                  

      



CBC W/PLT COUNT &amp; AUTO [id number] 17:38:00





             Test Item    Value        Reference Range Interpretation Comments

 

             WHITE BLOOD CELL COUNT (BEAKER) 7.8 K/ L     3.5-10.5              

    



             (test code = 775)                                        

 

             RED BLOOD CELL COUNT (BEAKER) 5.12 M/ L    4.63-6.08               

  



             (test code = 761)                                        

 

             HEMOGLOBIN (BEAKER) (test code = 14.7 GM/DL   13.7-17.5            

     



             410)                                                

 

             HEMATOCRIT (BEAKER) (test code = 43.3 %       40.1-51.0            

     



             411)                                                

 

             MEAN CORPUSCULAR VOLUME (BEAKER) 84.6 fL      79.0-92.2            

     



             (test code = 753)                                        

 

             MEAN CORPUSCULAR HEMOGLOBIN 28.7 pg      25.7-32.2                 



             (BEAKER) (test code = 751)                                        

 

             MEAN CORPUSCULAR HEMOGLOBIN CONC 33.9 GM/DL   32.3-36.5            

     



             (BEAKER) (test code = 752)                                        

 

             RED CELL DISTRIBUTION WIDTH 15.3 %       11.6-14.4    H            



             (BEAKER) (test code = 412)                                        

 

             PLATELET COUNT (BEAKER) (test 344 K/CU MM  150-450                 

  



             code = 756)                                         

 

             MEAN PLATELET VOLUME (BEAKER) 11.0 fL      9.4-12.4                

  



             (test code = 754)                                        

 

             NUCLEATED RED BLOOD CELLS 0 /100 WBC   0-0                       



             (BEAKER) (test code = 413)                                        



(CELLAVISION MANUAL DIFF)2020 17:38:00





             Test Item    Value        Reference Range Interpretation Comments

 

             NEUTROPHILS - REL 63 %                                   



             (CELLAVISION)(BEAKER) (test code                                   

     



             = 2816)                                             

 

             LYMPHOCYTES - REL 23 %                                   



             (CELLAVISION)(BEAKER) (test code                                   

     



             = 2817)                                             

 

             MONOCYTES - REL 6 %                                    



             (CELLAVISION)(BEAKER) (test code                                   

     



             = 2818)                                             

 

             EOSINOPHILS - REL 5 %                                    



             (CELLAVISION)(BEAKER) (test code                                   

     



             = 2819)                                             

 

             BASOPHILS - REL 1 %                                    



             (CELLAVISION)(BEAKER) (test code                                   

     



             = 2820)                                             

 

             BANDS - REL (CELLAVISION)(BEAKER) 2 %          0-10                

      



             (test code = 2826)                                        

 

             NEUTROPHILS - ABS 4.91 K/ul    1.78-5.38                 



             (CELLAVISION)(BEAKER) (test code                                   

     



             = 2830)                                             

 

             LYMPHOCYTES - ABS 1.79 K/ul    1.32-3.57                 



             (CELLAVISION)(BEAKER) (test code                                   

     



             = 2831)                                             

 

             MONOCYTES - ABS 0.47 K/uL    0.30-0.82                 



             (CELLAVISION)(BEAKER) (test code                                   

     



             = 2832)                                             

 

             EOSINOPHILS - ABS 0.39 K/uL    0.04-0.54                 



             (CELLAVISION)(BEAKER) (test code                                   

     



             = 2834)                                             

 

             BASOPHILS - ABS 0.08 K/uL    0.01-0.08                 



             (CELLAVISION)(BEAKER) (test code                                   

     



             = 2835)                                             

 

             BANDS - ABS (CELLAVISION)(BEAKER) 0.16 K/uL    0.00-0.80           

      



             (test code = 2840)                                        

 

             TOTAL COUNTED (BEAKER) (test code 100                              

      



             = 1351)                                             

 

             SMUDGE CELLS (BEAKER) (test code Present                           

     



             = 1371)                                             

 

             GIANT PLATELETS (BEAKER) (test Present                             

   



             code = 313)                                         

 

             ANISOCYTOSIS (BEAKER) (test code 1+ few                            

     



             = 961)                                              

 

             POIKILOCYTES (BEAKER) (test code 2+ moderate                       

     



             = 966)                                              

 

             SPHEROCYTES (BEAKER) (test code = 1+ few                           

      



             768)                                                

 

             OVALOCYTES (BEAKER) (test code = 1+ few                            

     



             477)                                                

 

             TEAR DROP CELLS (BEAKER) (test 1+ few                              

   



             code = 481)                                         

 

             ARTIFACT (CELLAVISION)(BEAKER) Present                             

   



             (test code = 3432)                                        

 

             PLATELET CONCENTRATION Adequate                               



             (CELLAVISION)(BEAKER) (test code                                   

     



             = 3438)                                             



Received comment: User comments: Slide comments:POCT-GLUCOSE [id number] 
16:27:00





             Test Item    Value        Reference Range Interpretation Comments

 

             POC-GLUCOSE METER 424 mg/dL           HH           : Notified

 RN/MD:



             (BEAKER) (test code =                                        TESTED

 AT [facility] 8766 7142)                                               [facility],



                                                                 [id number]:



                                                                 /Techni

christina ID



                                                                 = [id number] for AK

INSONU,



                                                                 LONA



CT, BRAIN, WITHOUT [id number] 15:31:00FINAL REPORT PATIENT ID: [id number] 
CT, BRAIN, WITHOUT CONTRAST CLINICAL INDICATION: Hydrocephalus COMPARISON: None 
TECHNIQUE: Noncontrast axial CT imaging of the brain and skull. DOSE REDUCTION: 
Dose modulation, iterative reconstruction, and/or weight-based adjustment of the
mA/kV was utilized to reduce the radiation dose to as low as reasonably 
achievable. FINDINGS:A total of two ventricular drainagecatheters are present. A
right transverse temporal catheter terminates across the midline just abovethe 
level of the foramen of Monro. A right parietal approach catheter terminates in 
the pineal region. Supratentorial ventricular configuration is slitlike. There 
is no herniation. The basilar cisternsare preserved. There is no identifiable 
recent infarct. 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 
partial callosal agenesis. Calvarial configuration escape of cephalic. There is 
no acute osseous abnormality. IMPRESSION: Chronic, likely congenital parenchymal
changes without recent infarct or hemorrhage. A total of two ventricular 
drainage catheters are present. Supratentorial ventricular configuration is 
slitlike and may represent over shunting. Correlation recommended. Signed:
JR Rae Robert MDReport Verified Date/Time: 2020 15:31:08 
Reading Location: [address] Neuro Reading Room Electronically signed by: 
ALONDRA RAE on 2020 03:31 PMRAD, SHUNT [id number] 
15:13:00Reason for exam:-&gt;concern for VPS malfunctionFINAL REPORT PATIENT ID:
[id number] RAD, SHUNT SERIES INDICATION: concern for VPS malfunction COMPARISON: 
None TECHNIQUE: AP and lateral radiographs of the skull, abdomen and chest were 
acquired to evaluate shunt catheter FINDINGS:An abandoned shunt catheter is 
present within the right neck. Medial to this a second shunt catheter is present
which descends along the left chest wall, terminating within the mid pelvis. 
Radiopaque portions of the catheter appear contiguous. Signed: Colby Reyes Verified Date/Time: 2020 15:13:54 Reading Location: [facility]
Radiology Reading Room Electronically signed by: COLBY REYES MD on
2020 03:13 PMPOCT-GLUCOSE [id number] 11:38:00





             Test Item    Value        Reference Range Interpretation Comments

 

             POC-GLUCOSE METER 394 mg/dL           H            : TESTED A

T [facility] [address]



             ([facility]) (test code =                                        [facility],



             1538)                                               [id number]:



                                                                 /Techni

christina ID



                                                                 = [id number] for LONA URIOSTEGUI



HEMOGLOBIN [id number] 09:15:00





             Test Item    Value        Reference Range Interpretation Comments

 

             HEMOGLOBIN A1C (BEAKER) (test code = 10.4 %       4.3-6.1      H   

         



             368)                                                



TSH/FREE T4 IF [id number] 07:54:00





             Test Item    Value        Reference Range Interpretation Comments

 

             THYROID STIMULATING HORMONE 1.40 uIU/mL  0.35-4.94                 



             (BEAKER) (test code = 772)                                        



POCT-GLUCOSE [id number] 07:48:00





             Test Item    Value        Reference Range Interpretation Comments

 

             POC-GLUCOSE METER > mg/dL             HH           : Notified

 RN/MD: TESTED



             (BEAKER) (test code =                                        AT [facility] 67[address] [facility]



             1538)                                               [facility], [address]

30:



                                                                 /Techni

christina ID =



                                                                 [id number] for LONA GOLDSMITH



BASIC METABOLIC [id number] 07:44:00





             Test Item    Value        Reference Range Interpretation Comments

 

             SODIUM (BEAKER) 134 meq/L    136-145      L            



             (test code = 381)                                        

 

             POTASSIUM (BEAKER) 4.4 meq/L    3.5-5.1                   



             (test code = 379)                                        

 

             CHLORIDE (BEAKER) 103 meq/L                        



             (test code = 382)                                        

 

             CO2 (BEAKER) (test 20 meq/L     22-29        L            



             code = 355)                                         

 

             BLOOD UREA NITROGEN 17 mg/dL     7-21                      



             (BEAKER) (test code                                        



             = 354)                                              

 

             CREATININE (BEAKER) 1.09 mg/dL   0.57-1.25                 



             (test code = 358)                                        

 

             GLUCOSE RANDOM 464 mg/dL           HH           



             (BEAKER) (test code                                        



             = 652)                                              

 

             CALCIUM (BEAKER) 8.6 mg/dL    8.4-10.2                  



             (test code = 697)                                        

 

             EGFR (BEAKER) (test 94 mL/min/1.73                           ESTIMA

HUMA GFR IS



             code = 1092) sq m                                   NOT AS ACCURATE

 AS



                                                                 CREATININE



                                                                 CLEARANCE IN



                                                                 PREDICTING



                                                                 GLOMERULAR



                                                                 FILTRATION RATE

.



                                                                 ESTIMATED GFR I

S



                                                                 NOT APPLICABLE 

FOR



                                                                 DIALYSIS PATIEN

TS.



LIPID [id number] 07:34:00





             Test Item    Value        Reference Range Interpretation Comments

 

             TRIGLYCERIDES (BEAKER) (test code = 750 mg/dL                      

        



             540)                                                

 

             CHOLESTEROL (BEAKER) (test code = 196 mg/dL                        

      



             631)                                                

 

             HDL CHOLESTEROL (BEAKER) (test code 22 mg/dL                       

        



             = 976)                                              



Calculated LDL not valid if triglyceride &gt;400 mg/dLTriglyceride Reference 
Range: Low Risk &lt;150Borderline 150-199 High Risk 200-499 Very High Risk 
&gt;=500Cholesterol Reference Range: Low Risk &lt;200 Borderline 200-239 High 
Risk &gt;240HDL Cholesterol Reference Range: Low Risk &gt;=60 High Risk&lt;40LDL
Cholesterol Reference Range: Optimal &lt;100 Near Optimal 100-129 Borderline 
130-159 High 160-189 Very High &gt;=190POCT-GLUCOSE [id number] 00:49:00





             Test Item    Value        Reference Range Interpretation Comments

 

             POC-GLUCOSE METER 399 mg/dL           H            : TESTED A

T [facility] 6720



             ([facility]) (test code =                                        MILY NELSON [facility],



             1538)                                               [id number]:



                                                                 /Techni

christina ID



                                                                 = [id number] for CLAY BATRES



RAD, FOOT, 2 VIEWS, [id number] 22:28:00Reason for exam:-&gt;osteomyletitis
FINAL REPORT PATIENT ID: [id number] TECHNIQUE: Two views each of the bilateral 
feet HISTORY: osteomyletitis. COMPARISON: None. IMPRESSION:No acute displaced 
fracture or dislocation. Joint spaces are within normal limits.Severe soft 
tissue swelling.On the right subcentimeter nonspecific calcifications adjacent 
to the heel plantar aspect. Correlate with physical exam. Severely limited exam 
due to patientbody habitus. No definite cortical irregularity.There is clinical 
concern for osteomyelitis, recommend MRI with contrast. Signed: Patricia Valera 
MDReport Verified Date/Time: 2020 22:28:25 Reading Location: [facility] C013 
Consult Reading Room Electronically signed by: PATRICIA VALERA DO on
2020 10:28 PMRAD, FOOT, 2 VIEWS, [id number] 22:28:00Reason for 
exam:-&gt;osteomyletitsFINAL REPORT PATIENT ID: [id number] TECHNIQUE: Two views 
each of the bilateral feet HISTORY: osteomyletitis. COMPARISON: None. 
IMPRESSION:No acute displaced fracture or dislocation. Joint spaces are within 
normal limits.Severe soft tissue swelling.On the right subcentimeter nonspecific
calcifications adjacent to the heel plantar aspect. Correlate with physical 
exam. Severely limited exam due to patientbody habitus. No definite cortical 
irregularity.There is clinical concern for osteomyelitis, recommend MRI with 
contrast. Signed: Patricia Valera MDReport Verified Date/Time: 2020 22:28:25
Reading Location: [facility] C013W Consult Reading Room Electronically signed by: 
PATRICIA VALERA DO on2020 10:28 PMPOCT-GLUCOSE [id number] 
21:04:00





             Test Item    Value        Reference Range Interpretation Comments

 

             POC-GLUCOSE METER 430 mg/dL           HH           : Notified

 RN/MD:



             (KUN) (test code =                                        TESTED

 AT [facility] [address]



             1538)                                               [facility],



                                                                 [id number]:



                                                                 /Techni

christina ID



                                                                 = [id number] for DEYSI ADRIAN



ERTAPENEM:SUSC:PT:ISOLATE:ORDQN:[id number] 16:48:00





             Test Item    Value        Reference Range Interpretation Comments

 

             Culture: Urine (test >100,000 CFU/mL Proteus                       

    



             code = Culture: mirabilis 10,000 -                           



             Urine)       50,000 CFU/mL Skin Jaime                           



[facility]:ORDQN:[id number] 16:48:00





             Test Item    Value        Reference Range Interpretation Comments

 

             Proteus mirabilis (test Proteus mirabilis                          

 



             code = Proteus mirabilis)                                        



[facility] [id number] 16:48:00





             Test Item    Value        Reference Range Interpretation Comments

 

             UA Nitrite (test code Negative (18 10:48                      

     



             = UA Nitrite) AM)                                    



[facility] AND [id number] 16:48:00





             Test Item    Value        Reference Range Interpretation Comments

 

             UA Bili (test code = Negative *NA*(18                         

  



             UA Bili)     10:48 AM)                              



[facility] AND [id number] 16:48:00





             Test Item    Value        Reference Range Interpretation Comments

 

             UA Ketones (test code Negative *NA*(18                        

   



             = UA Ketones) 10:48 AM)                              



[facility] AND [id number] 16:48:00





             Test Item    Value        Reference Range Interpretation Comments

 

             UA Blood (test code = Trace *ABN*(18                          

 



             UA Blood)    10:48 AM)                              



[facility] AND [id number] 16:48:00





             Test Item    Value        Reference Range Interpretation Comments

 

             UA Urobilinogen (test code = UA 0.2          0.1-1.0               

    



             Urobilinogen)                                        



[facility] AND [id number] 16:48:00





             Test Item    Value        Reference Range Interpretation Comments

 

             UA Leuk Est (test code Large *ABN*(18                         

  



             = UA Leuk Est) 10:48 AM)                              



[facility] AND [id number] 16:48:00





             Test Item    Value        Reference Range Interpretation Comments

 

             UA Protein (test code Negative (18 10:48                      

     



             = UA Protein) AM)                                    



[facility] AND [id number] 16:48:00





             Test Item    Value        Reference Range Interpretation Comments

 

             UA Glucose (test code Negative (18 10:48                      

     



             = UA Glucose) AM)                                    



[facility] AND [id number] 16:48:00





             Test Item    Value        Reference Range Interpretation Comments

 

             UA pH (test code = UA pH) 7.0 1        5.0-8.0                   



[facility] AND [id number] 16:48:00





             Test Item    Value        Reference Range Interpretation Comments

 

             UA Spec Grav (test code = UA Spec 1.015 1                          

      



             Grav)                                               



[facility] AND [id number] 16:48:00





             Test Item    Value        Reference Range Interpretation Comments

 

             UA Color (test code = Yellow *NA*(18                          

 



             UA Color)    10:48 AM)                              



[facility] AND [id number] 16:48:00





             Test Item    Value        Reference Range Interpretation Comments

 

             UA Turbidity (test code = Clear (18 10:48                     

      



             UA Turbidity) AM)                                    



[facility] AND [id number] 16:48:00





             Test Item    Value        Reference Range Interpretation Comments

 

             UA Mucus (test code = UA Mucus) Few /LPF                           

    



[facility] AND [id number] 16:48:00





             Test Item    Value        Reference Range Interpretation Comments

 

             UA Bacteria (test code = UA Few /HPF                               



             Bacteria)                                           



[facility] AND [id number] 16:48:00





             Test Item    Value        Reference Range Interpretation Comments

 

             UA RBC (test code = 0-2 /HPF     See_Comment                [Automa

huma message] The



             UA RBC)                                             system which ge

nerated



                                                                 this result tra

nsmitted



                                                                 reference range

: <=2. The



                                                                 reference range

 was not



                                                                 used to interpr

et this



                                                                 result as zayda

l/abnormal.



[facility] AND [id number] 16:48:00





             Test Item    Value        Reference Range Interpretation Comments

 

             UA Sq Epi (test code = None Seen (18                          

 



             UA Sq Epi)   10:48 AM)                              



[facility] AND [id number] 16:48:00





             Test Item    Value        Reference Range Interpretation Comments

 

             UA WBC (test code = UA WBC)  /HPF                            



[facility]:ORDQN:[id number] 16:48:00





             Test Item    Value        Reference Range Interpretation Comments

 

             Culture: Urine (test >100,000 CFU/mL Proteus                       

    



             code = Culture: mirabilis 10,000 -                           



             Urine)       50,000 CFU/mL Skin Jaime                           



[facility]:PT:ISOLATE:ORDQN:[id number] 16:48:00





             Test Item    Value        Reference Range Interpretation Comments

 

             Proteus mirabilis (test Proteus mirabilis                          

 



             code = Proteus mirabilis)                                        



[facility] AND [id number] 16:48:00





             Test Item    Value        Reference Range Interpretation Comments

 

             UA Nitrite (test code Negative (18 10:48                      

     



             = UA Nitrite) AM)                                    



[facility] AND [id number] 16:48:00





             Test Item    Value        Reference Range Interpretation Comments

 

             UA Bili (test code = Negative *NA*(18                         

  



             UA Bili)     10:48 AM)                              



[facility] AND [id number] 16:48:00





             Test Item    Value        Reference Range Interpretation Comments

 

             UA Ketones (test code Negative *NA*(18                        

   



             = UA Ketones) 10:48 AM)                              



[facility] AND [id number] 16:48:00





             Test Item    Value        Reference Range Interpretation Comments

 

             UA Blood (test code = Trace *ABN*(18                          

 



             UA Blood)    10:48 AM)                              



[facility] AND [id number] 16:48:00





             Test Item    Value        Reference Range Interpretation Comments

 

             UA Urobilinogen (test code = UA 0.2          0.1-1.0               

    



             Urobilinogen)                                        



[facility] AND [id number] 16:48:00





             Test Item    Value        Reference Range Interpretation Comments

 

             UA Leuk Est (test code Large *ABN*(18                         

  



             = UA Leuk Est) 10:48 AM)                              



[facility] AND [id number] 16:48:00





             Test Item    Value        Reference Range Interpretation Comments

 

             UA Protein (test code Negative (18 10:48                      

     



             = UA Protein) AM)                                    



[facility] AND [id number] 16:48:00





             Test Item    Value        Reference Range Interpretation Comments

 

             UA Glucose (test code Negative (18 10:48                      

     



             = UA Glucose) AM)                                    



[facility] AND [id number] 16:48:00





             Test Item    Value        Reference Range Interpretation Comments

 

             UA pH (test code = UA pH) 7.0 1        5.0-8.0                   



[facility] AND [id number] 16:48:00





             Test Item    Value        Reference Range Interpretation Comments

 

             UA Spec Grav (test code = UA Spec 1.015 1                          

      



             Grav)                                               



[facility] AND [id number] 16:48:00





             Test Item    Value        Reference Range Interpretation Comments

 

             UA Color (test code = Yellow *NA*(18                          

 



             UA Color)    10:48 AM)                              



[facility] AND [id number] 16:48:00





             Test Item    Value        Reference Range Interpretation Comments

 

             UA Turbidity (test code = Clear (18 10:48                     

      



             UA Turbidity) AM)                                    



[facility] AND [id number] 16:48:00





             Test Item    Value        Reference Range Interpretation Comments

 

             UA Mucus (test code = UA Mucus) Few /LPF                           

    



[facility] AND [id number] 16:48:00





             Test Item    Value        Reference Range Interpretation Comments

 

             UA Bacteria (test code = UA Few /HPF                               



             Bacteria)                                           



[facility] [id number] 16:48:00





             Test Item    Value        Reference Range Interpretation Comments

 

             UA RBC (test code = 0-2 /HPF     See_Comment                [Automa

huma message] The



             UA RBC)                                             system which ge

nerated



                                                                 this result tra

nsmitted



                                                                 reference range

: <=2. The



                                                                 reference range

 was not



                                                                 used to interpr

et this



                                                                 result as zayda

l/abnormal.



[facility] AND [id number] 16:48:00





             Test Item    Value        Reference Range Interpretation Comments

 

             UA Sq Epi (test code = None Seen (18                          

 



             UA Sq Epi)   10:48 AM)                              



[facility] AND [id number] 16:48:00





             Test Item    Value        Reference Range Interpretation Comments

 

             UA WBC (test code = UA WBC)  /HPF                            



[facility]IS[facility]ATE:ORDQN:[id number] 16:48:00





             Test Item    Value        Reference Range Interpretation Comments

 

             Culture: Urine (test >100,000 CFU/mL Proteus                       

    



             code = Culture: mirabilis 10,000 -                           



             Urine)       50,000 CFU/mL Skin Jaime                           



[facility]PT:ISOLATE:ORDQN:[id number] 16:48:00





             Test Item    Value        Reference Range Interpretation Comments

 

             Proteus mirabilis (test Proteus mirabilis                          

 



             code = Proteus mirabilis)                                        



[facility] AND [id number] 16:48:00





             Test Item    Value        Reference Range Interpretation Comments

 

             UA Nitrite (test code Negative (18 10:48                      

     



             = UA Nitrite) AM)                                    



[facility] AND [id number] 16:48:00





             Test Item    Value        Reference Range Interpretation Comments

 

             UA Bili (test code = Negative *NA*(18                         

  



             UA Bili)     10:48 AM)                              



[facility] AND [id number] 16:48:00





             Test Item    Value        Reference Range Interpretation Comments

 

             UA Ketones (test code Negative *NA*(18                        

   



             = UA Ketones) 10:48 AM)                              



[facility] AND [id number] 16:48:00





             Test Item    Value        Reference Range Interpretation Comments

 

             UA Blood (test code = Trace *ABN*(18                          

 



             UA Blood)    10:48 AM)                              



[facility] AND [id number] 16:48:00





             Test Item    Value        Reference Range Interpretation Comments

 

             UA Urobilinogen (test code = UA 0.2          0.1-1.0               

    



             Urobilinogen)                                        



[facility] AND [id number] 16:48:00





             Test Item    Value        Reference Range Interpretation Comments

 

             UA Leuk Est (test code Large *ABN*(18                         

  



             = UA Leuk Est) 10:48 AM)                              



[facility] AND [id number] 16:48:00





             Test Item    Value        Reference Range Interpretation Comments

 

             UA Protein (test code Negative (18 10:48                      

     



             = UA Protein) AM)                                    



[facility] AND [id number] 16:48:00





             Test Item    Value        Reference Range Interpretation Comments

 

             UA Glucose (test code Negative (18 10:48                      

     



             = UA Glucose) AM)                                    



[facility] AND [id number] 16:48:00





             Test Item    Value        Reference Range Interpretation Comments

 

             UA pH (test code = UA pH) 7.0 1        5.0-8.0                   



[facility] AND [id number] 16:48:00





             Test Item    Value        Reference Range Interpretation Comments

 

             UA Spec Grav (test code = UA Spec 1.015 1                          

      



             Grav)                                               



[facility] AND [id number] 16:48:00





             Test Item    Value        Reference Range Interpretation Comments

 

             UA Color (test code = Yellow *NA*(18                          

 



             UA Color)    10:48 AM)                              



[facility] AND [id number] 16:48:00





             Test Item    Value        Reference Range Interpretation Comments

 

             UA Turbidity (test code = Clear (18 10:48                     

      



             UA Turbidity) AM)                                    



[facility] AND [id number] 16:48:00





             Test Item    Value        Reference Range Interpretation Comments

 

             UA Mucus (test code = UA Mucus) Few /LPF                           

    



[facility] AND [id number] 16:48:00





             Test Item    Value        Reference Range Interpretation Comments

 

             UA Bacteria (test code = UA Few /HPF                               



             Bacteria)                                           



[facility] AND [id number] 16:48:00





             Test Item    Value        Reference Range Interpretation Comments

 

             UA RBC (test code = 0-2 /HPF     See_Comment                [Automa

huma message] The



             UA RBC)                                             system which ge

nerated



                                                                 this result tra

nsmitted



                                                                 reference range

: <=2. The



                                                                 reference range

 was not



                                                                 used to interpr

et this



                                                                 result as zayda

l/abnormal.



[facility] AND [id number] 16:48:00





             Test Item    Value        Reference Range Interpretation Comments

 

             UA Sq Epi (test code = None Seen (18                          

 



             UA Sq Epi)   10:48 AM)                              



[facility] AND [id number] 16:48:00





             Test Item    Value        Reference Range Interpretation Comments

 

             UA WBC (test code = UA WBC)  /HPF                            



[facility]ORDQN:[id number] 16:48:00





             Test Item    Value        Reference Range Interpretation Comments

 

             Culture: Urine (test >100,000 CFU/mL Proteus                       

    



             code = Culture: mirabilis 10,000 -                           



             Urine)       50,000 CFU/mL Skin Jaime                           



[facility]:ISOLATE:ORDQN:[id number] 16:48:00





             Test Item    Value        Reference Range Interpretation Comments

 

             Proteus mirabilis (test Proteus mirabilis                          

 



             code = Proteus mirabilis)                                        



[facility] AND [id number] 16:48:00





             Test Item    Value        Reference Range Interpretation Comments

 

             UA Nitrite (test code Negative (18 10:48                      

     



             = UA Nitrite) AM)                                    



[facility] AND [id number] 16:48:00





             Test Item    Value        Reference Range Interpretation Comments

 

             UA Bili (test code = Negative *NA*(18                         

  



             UA Bili)     10:48 AM)                              



[facility] AND [id number] 16:48:00





             Test Item    Value        Reference Range Interpretation Comments

 

             UA Ketones (test code Negative *NA*(18                        

   



             = UA Ketones) 10:48 AM)                              



[facility] AND [id number] 16:48:00





             Test Item    Value        Reference Range Interpretation Comments

 

             UA Blood (test code = Trace *ABN*(18                          

 



             UA Blood)    10:48 AM)                              



[facility] AND [id number] 16:48:00





             Test Item    Value        Reference Range Interpretation Comments

 

             UA Urobilinogen (test code = UA 0.2          0.1-1.0               

    



             Urobilinogen)                                        



[facility] AND [id number] 16:48:00





             Test Item    Value        Reference Range Interpretation Comments

 

             UA Leuk Est (test code Large *ABN*(18                         

  



             = UA Leuk Est) 10:48 AM)                              



[facility] AND [id number] 16:48:00





             Test Item    Value        Reference Range Interpretation Comments

 

             UA Protein (test code Negative (18 10:48                      

     



             = UA Protein) AM)                                    



[facility] AND [id number] 16:48:00





             Test Item    Value        Reference Range Interpretation Comments

 

             UA Glucose (test code Negative (18 10:48                      

     



             = UA Glucose) AM)                                    



[facility] AND [id number] 16:48:00





             Test Item    Value        Reference Range Interpretation Comments

 

             UA pH (test code = UA pH) 7.0 1        5.0-8.0                   



[facility] AND [id number] 16:48:00





             Test Item    Value        Reference Range Interpretation Comments

 

             UA Spec Grav (test code = UA Spec 1.015 1                          

      



             Grav)                                               



[facility] AND [id number] 16:48:00





             Test Item    Value        Reference Range Interpretation Comments

 

             UA Color (test code = Yellow *NA*(18                          

 



             UA Color)    10:48 AM)                              



[facility] AND [id number] 16:48:00





             Test Item    Value        Reference Range Interpretation Comments

 

             UA Turbidity (test code = Clear (18 10:48                     

      



             UA Turbidity) AM)                                    



[facility] AND [id number] 16:48:00





             Test Item    Value        Reference Range Interpretation Comments

 

             UA Mucus (test code = UA Mucus) Few /LPF                           

    



[facility] AND [id number] 16:48:00





             Test Item    Value        Reference Range Interpretation Comments

 

             UA Bacteria (test code = UA Few /HPF                               



             Bacteria)                                           



[facility] AND [id number] 16:48:00





             Test Item    Value        Reference Range Interpretation Comments

 

             UA RBC (test code = 0-2 /HPF     See_Comment                [Automa

huma message] The



             UA RBC)                                             system which ge

nerated



                                                                 this result tra

nsmitted



                                                                 reference range

: <=2. The



                                                                 reference range

 was not



                                                                 used to interpr

et this



                                                                 result as zayda

l/abnormal.



[facility] AND [id number] 16:48:00





             Test Item    Value        Reference Range Interpretation Comments

 

             UA Sq Epi (test code = None Seen (18                          

 



             UA Sq Epi)   10:48 AM)                              



[facility] AND [id number] 16:48:00





             Test Item    Value        Reference Range Interpretation Comments

 

             UA WBC (test code = UA WBC)  /HPF                            



[facility]:ORDQN:[id number] 16:48:00





             Test Item    Value        Reference Range Interpretation Comments

 

             Culture: Urine (test >100,000 CFU/mL Proteus                       

    



             code = Culture: mirabilis 10,000 -                           



             Urine)       50,000 CFU/mL Skin Jaime                           



[facility]ATE:ORDQN:[id number] 16:48:00





             Test Item    Value        Reference Range Interpretation Comments

 

             Proteus mirabilis (test Proteus mirabilis                          

 



             code = Proteus mirabilis)                                        



[facility] AND [id number] 16:48:00





             Test Item    Value        Reference Range Interpretation Comments

 

             UA Nitrite (test code Negative (18 10:48                      

     



             = UA Nitrite) AM)                                    



[facility] AND [id number] 16:48:00





             Test Item    Value        Reference Range Interpretation Comments

 

             UA Bili (test code = Negative *NA*(18                         

  



             UA Bili)     10:48 AM)                              



[facility] AND [id number] 16:48:00





             Test Item    Value        Reference Range Interpretation Comments

 

             UA Ketones (test code Negative *NA*(18                        

   



             = UA Ketones) 10:48 AM)                              



[facility] AND [id number] 16:48:00





             Test Item    Value        Reference Range Interpretation Comments

 

             UA Blood (test code = Trace *ABN*(18                          

 



             UA Blood)    10:48 AM)                              



[facility] AND [id number] 16:48:00





             Test Item    Value        Reference Range Interpretation Comments

 

             UA Urobilinogen (test code = UA 0.2          0.1-1.0               

    



             Urobilinogen)                                        



[facility] AND [id number] 16:48:00





             Test Item    Value        Reference Range Interpretation Comments

 

             UA Leuk Est (test code Large *ABN*(18                         

  



             = UA Leuk Est) 10:48 AM)                              



[facility] AND [id number] 16:48:00





             Test Item    Value        Reference Range Interpretation Comments

 

             UA Protein (test code Negative (18 10:48                      

     



             = UA Protein) AM)                                    



[facility] AND [id number] 16:48:00





             Test Item    Value        Reference Range Interpretation Comments

 

             UA Glucose (test code Negative (18 10:48                      

     



             = UA Glucose) AM)                                    



[facility] AND [id number] 16:48:00





             Test Item    Value        Reference Range Interpretation Comments

 

             UA pH (test code = UA pH) 7.0 1        5.0-8.0                   



[facility] AND [id number] 16:48:00





             Test Item    Value        Reference Range Interpretation Comments

 

             UA Spec Grav (test code = UA Spec 1.015 1                          

      



             Grav)                                               



[facility] AND [id number] 16:48:00





             Test Item    Value        Reference Range Interpretation Comments

 

             UA Color (test code = Yellow *NA*(18                          

 



             UA Color)    10:48 AM)                              



[facility] AND [id number] 16:48:00





             Test Item    Value        Reference Range Interpretation Comments

 

             UA Turbidity (test code = Clear (18 10:48                     

      



             UA Turbidity) AM)                                    



[facility] AND [id number] 16:48:00





             Test Item    Value        Reference Range Interpretation Comments

 

             UA Mucus (test code = UA Mucus) Few /LPF                           

    



[facility] AND [id number] 16:48:00





             Test Item    Value        Reference Range Interpretation Comments

 

             UA Bacteria (test code = UA Few /HPF                               



             Bacteria)                                           



[facility] AND [id number] 16:48:00





             Test Item    Value        Reference Range Interpretation Comments

 

             UA RBC (test code = 0-2 /HPF     See_Comment                [Automa

huma message] The



             UA RBC)                                             system which ge

nerated



                                                                 this result tra

nsmitted



                                                                 reference range

: <=2. The



                                                                 reference range

 was not



                                                                 used to interpr

et this



                                                                 result as zayda

l/abnormal.



[facility] AND [id number] 16:48:00





             Test Item    Value        Reference Range Interpretation Comments

 

             UA Sq Epi (test code = None Seen (18                          

 



             UA Sq Epi)   10:48 AM)                              



[facility] AND [id number] 16:48:00





             Test Item    Value        Reference Range Interpretation Comments

 

             UA WBC (test code = UA WBC)  /HPF                            



[facility]ORDQN:[id number] 16:48:00





             Test Item    Value        Reference Range Interpretation Comments

 

             Culture: Urine (test >100,000 CFU/mL Proteus                       

    



             code = Culture: mirabilis 10,000 -                           



             Urine)       50,000 CFU/mL Skin Jaime                           



[facility]ORDQN:[id number] 16:48:00





             Test Item    Value        Reference Range Interpretation Comments

 

             Proteus mirabilis (test Proteus mirabilis                          

 



             code = Proteus mirabilis)                                        



[facility] AND [id number] 16:48:00





             Test Item    Value        Reference Range Interpretation Comments

 

             UA Nitrite (test code Negative (18 10:48                      

     



             = UA Nitrite) AM)                                    



[facility] AND [id number] 16:48:00





             Test Item    Value        Reference Range Interpretation Comments

 

             UA Bili (test code = Negative *NA*(18                         

  



             UA Bili)     10:48 AM)                              



[facility] AND [id number] 16:48:00





             Test Item    Value        Reference Range Interpretation Comments

 

             UA Ketones (test code Negative *NA*(18                        

   



             = UA Ketones) 10:48 AM)                              



[facility] AND [id number] 16:48:00





             Test Item    Value        Reference Range Interpretation Comments

 

             UA Blood (test code = Trace *ABN*(18                          

 



             UA Blood)    10:48 AM)                              



[facility] AND [id number] 16:48:00





             Test Item    Value        Reference Range Interpretation Comments

 

             UA Urobilinogen (test code = UA 0.2          0.1-1.0               

    



             Urobilinogen)                                        



[facility] AND [id number] 16:48:00





             Test Item    Value        Reference Range Interpretation Comments

 

             UA Leuk Est (test code Large *ABN*(18                         

  



             = UA Leuk Est) 10:48 AM)                              



[facility] AND [id number] 16:48:00





             Test Item    Value        Reference Range Interpretation Comments

 

             UA Protein (test code Negative (18 10:48                      

     



             = UA Protein) AM)                                    



[facility] AND [id number] 16:48:00





             Test Item    Value        Reference Range Interpretation Comments

 

             UA Glucose (test code Negative (18 10:48                      

     



             = UA Glucose) AM)                                    



[facility] AND [id number] 16:48:00





             Test Item    Value        Reference Range Interpretation Comments

 

             UA pH (test code = UA pH) 7.0 1        5.0-8.0                   



[facility] AND [id number] 16:48:00





             Test Item    Value        Reference Range Interpretation Comments

 

             UA Spec Grav (test code = UA Spec 1.015 1                          

      



             Grav)                                               



[facility] AND [id number] 16:48:00





             Test Item    Value        Reference Range Interpretation Comments

 

             UA Color (test code = Yellow *NA*(18                          

 



             UA Color)    10:48 AM)                              



[facility] AND [id number] 16:48:00





             Test Item    Value        Reference Range Interpretation Comments

 

             UA Turbidity (test code = Clear (18 10:48                     

      



             UA Turbidity) AM)                                    



[facility] AND [id number] 16:48:00





             Test Item    Value        Reference Range Interpretation Comments

 

             UA Mucus (test code = UA Mucus) Few /LPF                           

    



[facility] AND [id number] 16:48:00





             Test Item    Value        Reference Range Interpretation Comments

 

             UA Bacteria (test code = UA Few /HPF                               



             Bacteria)                                           



[facility] AND [id number] 16:48:00





             Test Item    Value        Reference Range Interpretation Comments

 

             UA RBC (test code = 0-2 /HPF     See_Comment                [Automa

huma message] The



             UA RBC)                                             system which ge

nerated



                                                                 this result tra

nsmitted



                                                                 reference range

: <=2. The



                                                                 reference range

 was not



                                                                 used to interpr

et this



                                                                 result as zayda

l/abnormal.



[facility] AND [id number] 16:48:00





             Test Item    Value        Reference Range Interpretation Comments

 

             UA Sq Epi (test code = None Seen (18                          

 



             UA Sq Epi)   10:48 AM)                              



[facility] AND [id number] 16:48:00





             Test Item    Value        Reference Range Interpretation Comments

 

             UA WBC (test code = UA WBC)  /HPF                            



[facility]:ORDQN:[id number] 16:48:00





             Test Item    Value        Reference Range Interpretation Comments

 

             Culture: Urine (test >100,000 CFU/mL Proteus                       

    



             code = Culture: mirabilis 10,000 -                           



             Urine)       50,000 CFU/mL Skin Jaime                           



[facility]:ORDQN:[id number] 16:48:00





             Test Item    Value        Reference Range Interpretation Comments

 

             Proteus mirabilis (test Proteus mirabilis                          

 



             code = Proteus mirabilis)                                        



[facility] AND [id number] 16:48:00





             Test Item    Value        Reference Range Interpretation Comments

 

             UA Nitrite (test code Negative (18 10:48                      

     



             = UA Nitrite) AM)                                    



[facility] AND [id number] 16:48:00





             Test Item    Value        Reference Range Interpretation Comments

 

             UA Bili (test code = Negative *NA*(18                         

  



             UA Bili)     10:48 AM)                              



[facility] AND [id number] 16:48:00





             Test Item    Value        Reference Range Interpretation Comments

 

             UA Ketones (test code Negative *NA*(18                        

   



             = UA Ketones) 10:48 AM)                              



[facility] AND [id number] 16:48:00





             Test Item    Value        Reference Range Interpretation Comments

 

             UA Blood (test code = Trace *ABN*(18                          

 



             UA Blood)    10:48 AM)                              



[facility] AND [id number] 16:48:00





             Test Item    Value        Reference Range Interpretation Comments

 

             UA Urobilinogen (test code = UA 0.2          0.1-1.0               

    



             Urobilinogen)                                        



[facility] AND [id number] 16:48:00





             Test Item    Value        Reference Range Interpretation Comments

 

             UA Leuk Est (test code Large *ABN*(18                         

  



             = UA Leuk Est) 10:48 AM)                              



[facility] AND [id number] 16:48:00





             Test Item    Value        Reference Range Interpretation Comments

 

             UA Protein (test code Negative (18 10:48                      

     



             = UA Protein) AM)                                    



[facility] AND [id number] 16:48:00





             Test Item    Value        Reference Range Interpretation Comments

 

             UA Glucose (test code Negative (18 10:48                      

     



             = UA Glucose) AM)                                    



[facility] AND [id number] 16:48:00





             Test Item    Value        Reference Range Interpretation Comments

 

             UA pH (test code = UA pH) 7.0 1        5.0-8.0                   



[facility] AND [id number] 16:48:00





             Test Item    Value        Reference Range Interpretation Comments

 

             UA Spec Grav (test code = UA Spec 1.015 1                          

      



             Grav)                                               



[facility] AND [id number] 16:48:00





             Test Item    Value        Reference Range Interpretation Comments

 

             UA Color (test code = Yellow *NA*(18                          

 



             UA Color)    10:48 AM)                              



[facility] AND [id number] 16:48:00





             Test Item    Value        Reference Range Interpretation Comments

 

             UA Turbidity (test code = Clear (18 10:48                     

      



             UA Turbidity) AM)                                    



[facility] AND [id number] 16:48:00





             Test Item    Value        Reference Range Interpretation Comments

 

             UA Mucus (test code = UA Mucus) Few /LPF                           

    



[facility] AND [id number] 16:48:00





             Test Item    Value        Reference Range Interpretation Comments

 

             UA Bacteria (test code = UA Few /HPF                               



             Bacteria)                                           



[facility] AND [id number] 16:48:00





             Test Item    Value        Reference Range Interpretation Comments

 

             UA RBC (test code = 0-2 /HPF     See_Comment                [Automa

huma message] The



             UA RBC)                                             system which ge

nerated



                                                                 this result tra

nsmitted



                                                                 reference range

: <=2. The



                                                                 reference range

 was not



                                                                 used to interpr

et this



                                                                 result as zayda

l/abnormal.



[facility] [id number] 16:48:00





             Test Item    Value        Reference Range Interpretation Comments

 

             UA Sq Epi (test code = None Seen (18                          

 



             UA Sq Epi)   10:48 AM)                              



[facility] AND [id number] 16:48:00





             Test Item    Value        Reference Range Interpretation Comments

 

             UA WBC (test code = UA WBC)  /HPF                            



[facility] [employer] [facility] [id number] 11:57:00





             Test Item    Value        Reference Range Interpretation Comments

 

             Antibody Scrn (test Negative (18 5:57                         

  



             code = Antibody Scrn) AM)                                    



[facility] [employer] [id number] 11:57:00





             Test Item    Value        Reference Range Interpretation Comments

 

             ABO/Rh (test code = ABO/Rh) AB POS                                 



[id number]57:[facility]





             Test Item    Value        Reference Range Interpretation Comments

 

             PTT (test code = PTT) 33.4 s       22.9-35.8                 



[facility] RnidqiyXORPDMXUKI0585-44-18 11:57:00





             Test Item    Value        Reference Range Interpretation Comments

 

             PT (test code = PT) 13.7 s       12.0-14.7                 



[facility]AW[facility]WJFW7198[facility]39-[facility] 11:57:00





             Test Item    Value        Reference Range Interpretation Comments

 

             INR (test code = INR) 1.05 1       0.85-1.17                 



[facility] [id number] 11:57:00





             Test Item    Value        Reference Range Interpretation Comments

 

             Antibody Scrn (test Negative (18 5:57                         

  



             code = Antibody Scrn) AM)                                    



[facility] [id number] 11:57:00





             Test Item    Value        Reference Range Interpretation Comments

 

             ABO/Rh (test code = ABO/Rh) AB POS                                 



[facility] DallasWtsflamNDABELOXPN7603-47-14 11:57:00





             Test Item    Value        Reference Range Interpretation Comments

 

             PTT (test code = PTT) 33.4 s       22.9-35.8                 



[facility] DallasDbvvbxnKVIUYCFQFJ8683-49-37 11:57:00





             Test Item    Value        Reference Range Interpretation Comments

 

             PT (test code = PT) 13.7 s       12.0-14.7                 



[facility] DallasNgryzoxXSOPLWYFQR3937-69-16 11:57:00





             Test Item    Value        Reference Range Interpretation Comments

 

             INR (test code = INR) 1.05 1       0.85-1.17                 



[facility]  11:57:00





             Test Item    Value        Reference Range Interpretation Comments

 

             Antibody Scrn (test Negative (18 5:57                         

  



             code = Antibody Scrn) AM)                                    



[facility] [id number] 11:57:00





             Test Item    Value        Reference Range Interpretation Comments

 

             ABO/Rh (test code = ABO/Rh) AB POS                                 



[facility] [id number] 11:57:00





             Test Item    Value        Reference Range Interpretation Comments

 

             PTT (test code = PTT) 33.4 s       22.9-35.8                 



[facility] DallasYoigvipWWUSHIUVLG2653-33-99 11:57:00





             Test Item    Value        Reference Range Interpretation Comments

 

             PT (test code = PT) 13.7 s       12.0-14.7                 



[facility] DallasLpwfcvnAGXLUXFDPB6317-48-00 11:57:00





             Test Item    Value        Reference Range Interpretation Comments

 

             INR (test code = INR) 1.05 1       0.85-1.17                 



[facility] [id number] 11:57:00





             Test Item    Value        Reference Range Interpretation Comments

 

             Antibody Scrn (test Negative (18 5:57                         

  



             code = Antibody Scrn) AM)                                    



[facility]  11:57:00





             Test Item    Value        Reference Range Interpretation Comments

 

             ABO/Rh (test code = ABO/Rh) AB POS                                 



[facility] DallasQxpvykyJOJXGZWMSX7589-21-70 11:57:00





             Test Item    Value        Reference Range Interpretation Comments

 

             PTT (test code = PTT) 33.4 s       22.9-35.8                 



[facility] DallasUminxfbBZZNPKHPMY9531-67-49 11:57:00





             Test Item    Value        Reference Range Interpretation Comments

 

             PT (test code = PT) 13.7 s       12.0-14.7                 



[facility] DallasTsnsqsdFDCTZUHSER8590-04-22 11:57:00





             Test Item    Value        Reference Range Interpretation Comments

 

             INR (test code = INR) 1.05 1       0.85-1.17                 



[facility]  11:57:00





             Test Item    Value        Reference Range Interpretation Comments

 

             Antibody Scrn (test Negative (18 5:57                         

  



             code = Antibody Scrn) AM)                                    



[facility]  11:57:00





             Test Item    Value        Reference Range Interpretation Comments

 

             ABO/Rh (test code = ABO/Rh) AB POS                                 



[facility] DallasVbmjunsLBTMQLZCZO1293-95-02 11:57:00





             Test Item    Value        Reference Range Interpretation Comments

 

             PTT (test code = PTT) 33.4 s       22.9-35.8                 



[facility] DallasWsduaznYBQQWJNEIK1882-47-43 11:57:00





             Test Item    Value        Reference Range Interpretation Comments

 

             PT (test code = PT) 13.7 s       12.0-14.7                 



[facility] DallasHjnbyytBUNYHQSZHN3060-02-24 11:57:00





             Test Item    Value        Reference Range Interpretation Comments

 

             INR (test code = INR) 1.05 1       0.85-1.17                 



[facility] [id number] 11:57:00





             Test Item    Value        Reference Range Interpretation Comments

 

             Antibody Scrn (test Negative (18 5:57                         

  



             code = Antibody Scrn) AM)                                    



[facility] [id number] 11:57:00





             Test Item    Value        Reference Range Interpretation Comments

 

             ABO/Rh (test code = ABO/Rh) AB POS                                 



[facility] DallasNbuctipEUCKTMSYRU8480-07-50 11:57:00





             Test Item    Value        Reference Range Interpretation Comments

 

             PTT (test code = PTT) 33.4 s       22.9-35.8                 



[facility] DallasTfubukwFAJPKGBOQK0290-34-40 11:57:00





             Test Item    Value        Reference Range Interpretation Comments

 

             PT (test code = PT) 13.7 s       12.0-14.7                 



[facility] DallasBzdgoomRBGWZQYFPI7122-44-20 11:57:00





             Test Item    Value        Reference Range Interpretation Comments

 

             INR (test code = INR) 1.05 1       0.85-1.17                 



[facility] [id number] 11:57:00





             Test Item    Value        Reference Range Interpretation Comments

 

             Antibody Scrn (test Negative (18 5:57                         

  



             code = Antibody Scrn) AM)                                    



[facility] [employer] [facility] [id number] 11:57:00





             Test Item    Value        Reference Range Interpretation Comments

 

             ABO/Rh (test code = ABO/Rh) AB POS                                 



[facility] DallasWztcrbxLZPQDHVMGN5029-59-06 11:57:00





             Test Item    Value        Reference Range Interpretation Comments

 

             PTT (test code = PTT) 33.4 s       22.9-35.8                 



[facility] HospitalQtzygsyYUOPNFFSUI7283-45-52 11:57:00





             Test Item    Value        Reference Range Interpretation Comments

 

             PT (test code = PT) 13.7 s       12.0-14.7                 



[facility] HospitalMgtjpgqRGADGXIEFP6554-50-14 11:57:00





             Test Item    Value        Reference Range Interpretation Comments

 

             INR (test code = INR) 1.05 1       0.85-1.17                 



[facility] [id number] 10:50:01





             Test Item    Value        Reference Range Interpretation Comments

 

             eGFR (test code = eGFR) 133                                    



[facility] [id number] 10:50:01





             Test Item    Value        Reference Range Interpretation Comments

 

             Calcium Lvl (test code = Calcium Lvl) 9.4          8.5-10.5        

          



[facility] [employer] [id number] 10:50:01





             Test Item    Value        Reference Range Interpretation Comments

 

             CO2 (test code = CO2) 28           24-32                     



[facility] [employer] [id number] 10:50:01





             Test Item    Value        Reference Range Interpretation Comments

 

             BUN (test code = BUN) 14           7-22                      



[facility] [id number] 10:50:01





             Test Item    Value        Reference Range Interpretation Comments

 

             Glucose Lvl (test code = Glucose Lvl) 89           70-99           

          



[facility]2018-11-08 10:50:01





             Test Item    Value        Reference Range Interpretation Comments

 

             Chloride Lvl (test code = Chloride Lvl) 104                  

            



[facility]2018-11-08 10:50:01





             Test Item    Value        Reference Range Interpretation Comments

 

             Potassium Lvl (test code = Potassium 4.2          3.5-5.1          

         



             Lvl)                                                



[facility]2018-11-08 10:50:01





             Test Item    Value        Reference Range Interpretation Comments

 

             Sodium Lvl (test code = Sodium Lvl) 137          135-145           

        



[facility]2018-11-08 10:50:01





             Test Item    Value        Reference Range Interpretation Comments

 

             Creatinine Lvl (test code = Creatinine 0.85         0.50-1.40      

           



             Lvl)                                                



[facility]2018-11-08 10:50:01





             Test Item    Value        Reference Range Interpretation Comments

 

             AGAP (test code = AGAP) 9.2          10.0-20.0                 



[place] [address]-11-08 10:50:01





             Test Item    Value        Reference Range Interpretation Comments

 

             ACT (TEG) Rapid (test code = ACT (TEG) 136 s                 

           



             Rapid)                                              



[facility] DallasMmtplmzIOBUQSDHGN0907-11-85 10:50:01





             Test Item    Value        Reference Range Interpretation Comments

 

             Split Point Rapid (test code = Split 0.6 min                       

         



             Point Rapid)                                        



[facility] DallasOucqylqKMTMOHSEWT0749-35-01 10:50:01





             Test Item    Value        Reference Range Interpretation Comments

 

             R-time Rapid (test code = R-time 0.9 min      0.4-0.7              

     



             Rapid)                                              



[facility] DallasZwssbpkGPVJEERRDU2442-64-04 10:50:01





             Test Item    Value        Reference Range Interpretation Comments

 

             K-time Rapid (test code = K-time 1.4 min      0.6-2.3              

     



             Rapid)                                              



[facility] DallasAjzyirrMXIYKMKNOY5831-18-10 10:50:01





             Test Item    Value        Reference Range Interpretation Comments

 

             Angle Rapid (test code = Angle 71 degrees   64-80                  

   



             Rapid)                                              



[facility] DallasTboqzopTSXACKDBFE6679-36-24 10:50:01





             Test Item    Value        Reference Range Interpretation Comments

 

             G-value Rapid (test code = G-value 12.7         5.0-11.6           

       



             Rapid)                                              



[facility] DallasKnyavvlMFVUBRHBZB0924-50-92 10:50:01





             Test Item    Value        Reference Range Interpretation Comments

 

             Max Amplitude Rapid (test code = Max 72 mm        52-71            

         



             Amplitude Rapid)                                        



[facility] DallasBkcrqwgGHPSFAIWSD0458-48-94 10:50:01





             Test Item    Value        Reference Range Interpretation Comments

 

             Estimated % Lysis Rapid 0.1          See_Comment                [Au

tomated message] The



             (test code = Estimated                                        syste

m which generated



             % Lysis Rapid)                                        this result t

ransmitted



                                                                 reference range

: <=7.5.



                                                                 The reference r

zhen was



                                                                 not used to int

erpret



                                                                 this result as



                                                                 normal/abnormal

.



[facility] DallasXyqvwbnJXYWVBATHW9896-17-19 10:50:01





             Test Item    Value        Reference Range Interpretation Comments

 

             Platelet (test code = Platelet) 367          133-450               

    



[facility] DallasKcvsgtjTHGTHEDTVV8832-85-57 10:50:01





             Test Item    Value        Reference Range Interpretation Comments

 

             MPV (test code = MPV) 7.8          7.4-10.4                  



[facility] DallasXhquobmLUIBUUOEJS9383-02-57 10:50:01





             Test Item    Value        Reference Range Interpretation Comments

 

             MCH (test code = MCH) 27.4 pg      27.0-31.0                 



[facility] DallasEtnohxsLCFWFVVCSE6898-87-37 10:50:01





             Test Item    Value        Reference Range Interpretation Comments

 

             MCV (test code = MCV) 80.7         80.0-94.0                 



[facility] DallasVtjsjwsEZMAHQJHTW4282-84-54 10:50:01





             Test Item    Value        Reference Range Interpretation Comments

 

             MCHC (test code = MCHC) 34.0         32.0-36.0                 



[facility] DallasCplyiivOOGQZDCHPI8019-20-98 10:50:01





             Test Item    Value        Reference Range Interpretation Comments

 

             RDW (test code = RDW) 18.9         11.5-14.5                 



[facility] DallasCqcvyuqFKDGWAKBIA7505-70-31 10:50:01





             Test Item    Value        Reference Range Interpretation Comments

 

             Hct (test code = Hct) 43.3         42.0-54.0                 



[facility] DallasAyhdexjDSPNPRIWUU0686-49-75 10:50:01





             Test Item    Value        Reference Range Interpretation Comments

 

             WBC (test code = WBC) 9.3          3.7-10.4                  



[facility] DallasYwxkdrbVTWFPJTPIA1443-81-84 10:50:01





             Test Item    Value        Reference Range Interpretation Comments

 

             Hgb (test code = Hgb) 14.7         14.0-18.0                 



[facility] DallasEtqmktlZFOKBIQQZC1233-67-19 10:50:01





             Test Item    Value        Reference Range Interpretation Comments

 

             RBC (test code = RBC) 5.36         4.70-6.10                 



[facility] DallasNnipsawVLVNSCGTIN0480-11-65 10:50:01





             Test Item    Value        Reference Range Interpretation Comments

 

             Eosinophils # (test code 0.2          See_Comment                [A

utomated message] The



             = Eosinophils #)                                        system whic

h generated



                                                                 this result tra

nsmitted



                                                                 reference range

: <=0.5.



                                                                 The reference r

zhen was



                                                                 not used to int

erpret



                                                                 this result as



                                                                 normal/abnormal

.



[facility] DallasOkofswpMFDWLQYBZC6301-94-26 10:50:01





             Test Item    Value        Reference Range Interpretation Comments

 

             Basophils # (test code 0.1          See_Comment                [Aut

omated message] The



             = Basophils #)                                        system which 

generated



                                                                 this result tra

nsmitted



                                                                 reference range

: <=0.2.



                                                                 The reference r

zhen was



                                                                 not used to int

erpret



                                                                 this result as



                                                                 normal/abnormal

.



[place] [facility]afzz[facility]SOBY[facility]DMMJG9990[facility]39 [facility]50:01





             Test Item    Value        Reference Range Interpretation Comments

 

             Lymphocytes # (test code = Lymphocytes 1.8          1.0-5.5        

           



             #)                                                  



[facility] DallasHzhzqwmGAVZMPUDQH6874-22-76 10:50:01





             Test Item    Value        Reference Range Interpretation Comments

 

             Monocytes # (test code 0.9          See_Comment                [Aut

omated message] The



             = Monocytes #)                                        system which 

generated



                                                                 this result tra

nsmitted



                                                                 reference range

: <=0.8.



                                                                 The reference r

zhen was



                                                                 not used to int

erpret



                                                                 this result as



                                                                 normal/abnormal

.



[facility] DallasVtexppbYJLBKZSWUM3611-62-63 10:50:01





             Test Item    Value        Reference Range Interpretation Comments

 

             Neutrophils # (test code = Neutrophils 6.3          1.5-8.1        

           



             #)                                                  



[facility] DallasUyhsncsCYOJKOEULW0259-66-24 10:50:01





             Test Item    Value        Reference Range Interpretation Comments

 

             Eosinophils (test code = 2.0          See_Comment                [A

utomated message] The



             Eosinophils)                                        system which ge

nerated



                                                                 this result tra

nsmitted



                                                                 reference range

: <=4.0.



                                                                 The reference r

zhen was



                                                                 not used to int

erpret



                                                                 this result as



                                                                 normal/abnormal

.



[facility] DallasLjngkcmANHURIVQRH5157-65-88 10:50:01





             Test Item    Value        Reference Range Interpretation Comments

 

             Segs (test code = Segs) 67.4         45.0-75.0                 



[facility] DallasVfjsqwjNUOBVEONVS0061-43-60 10:50:01





             Test Item    Value        Reference Range Interpretation Comments

 

             Lymphocytes (test code = Lymphocytes) 19.9         20.0-40.0       

          



[facility] DallasZpqglaoTGLDWLSXPH1446-50-65 10:50:01





             Test Item    Value        Reference Range Interpretation Comments

 

             Basophils (test code = 1.0          See_Comment                [Aut

omated message] The



             Basophils)                                          system which ge

nerated



                                                                 this result tra

nsmitted



                                                                 reference range

: <=1.0.



                                                                 The reference r

zhen was



                                                                 not used to int

erpret



                                                                 this result as



                                                                 normal/abnormal

.



[facility] DallasAmahughJLIWLSLIAL1256-32-48 10:50:01





             Test Item    Value        Reference Range Interpretation Comments

 

             Monocytes (test code = Monocytes) 9.7          2.0-12.0            

      



[facility]2018-11-08 10:50:01





             Test Item    Value        Reference Range Interpretation Comments

 

             eGFR (test code = eGFR) 133                                    



[facility]2018-11-08 10:50:01





             Test Item    Value        Reference Range Interpretation Comments

 

             Calcium Lvl (test code = Calcium Lvl) 9.4          8.5-10.5        

          



[facility]2018-11-08 10:50:01





             Test Item    Value        Reference Range Interpretation Comments

 

             CO2 (test code = CO2) 28           24-32                     



[facility]2018-11-08 10:50:01





             Test Item    Value        Reference Range Interpretation Comments

 

             BUN (test code = BUN) 14           7-22                      



[facility]2018-11-08 10:50:01





             Test Item    Value        Reference Range Interpretation Comments

 

             Glucose Lvl (test code = Glucose Lvl) 89           70-99           

          



[facility]2018-11-08 10:50:01





             Test Item    Value        Reference Range Interpretation Comments

 

             Chloride Lvl (test code = Chloride Lvl) 104                  

            



[facility]2018-11-08 10:50:01





             Test Item    Value        Reference Range Interpretation Comments

 

             Potassium Lvl (test code = Potassium 4.2          3.5-5.1          

         



             Lvl)                                                



[facility]2018-11-08 10:50:01





             Test Item    Value        Reference Range Interpretation Comments

 

             Sodium Lvl (test code = Sodium Lvl) 137          135-145           

        



[facility]2018-11-08 10:50:01





             Test Item    Value        Reference Range Interpretation Comments

 

             Creatinine Lvl (test code = Creatinine 0.85         0.50-1.40      

           



             Lvl)                                                



[facility]2018-11-08 10:50:01





             Test Item    Value        Reference Range Interpretation Comments

 

             AGAP (test code = AGAP) 9.2          10.0-20.0                 



[facility] DallasZtykqlhIEIRPAIHFW4912-65-13 10:50:01





             Test Item    Value        Reference Range Interpretation Comments

 

             ACT (TEG) Rapid (test code = ACT (TEG) 136 s                 

           



             Rapid)                                              



[facility] DallasDuhhygbLMWRSCIZNR1021-31-98 10:50:01





             Test Item    Value        Reference Range Interpretation Comments

 

             Split Point Rapid (test code = Split 0.6 min                       

         



             Point Rapid)                                        



[facility] DallasAfoqwusVQWTYEQOIX9347-42-35 10:50:01





             Test Item    Value        Reference Range Interpretation Comments

 

             R-time Rapid (test code = R-time 0.9 min      0.4-0.7              

     



             Rapid)                                              



[facility] DallasEkggoyqANKOJUIRSZ1717-59-46 10:50:01





             Test Item    Value        Reference Range Interpretation Comments

 

             K-time Rapid (test code = K-time 1.4 min      0.6-2.3              

     



             Rapid)                                              



[facility] DallasDhdgkwvVAUPVEISLX1319-15-92 10:50:01





             Test Item    Value        Reference Range Interpretation Comments

 

             Angle Rapid (test code = Angle 71 degrees   64-80                  

   



             Rapid)                                              



[facility] DallasNwkucymHHHAQLBGGH3225-73-68 10:50:01





             Test Item    Value        Reference Range Interpretation Comments

 

             G-value Rapid (test code = G-value 12.7         5.0-11.6           

       



             Rapid)                                              



[facility] DallasJgfelnuZXPFIZHPGC1649-49-42 10:50:01





             Test Item    Value        Reference Range Interpretation Comments

 

             Max Amplitude Rapid (test code = Max 72 mm        52-71            

         



             Amplitude Rapid)                                        



[facility] DallasGsxtuchNQSNOGKCVR9564-80-13 10:50:01





             Test Item    Value        Reference Range Interpretation Comments

 

             Estimated % Lysis Rapid 0.1          See_Comment                [Au

tomated message] The



             (test code = Estimated                                        syste

m which generated



             % Lysis Rapid)                                        this result t

ransmitted



                                                                 reference range

: <=7.5.



                                                                 The reference r

zhen was



                                                                 not used to int

erpret



                                                                 this result as



                                                                 normal/abnormal

.



[facility] DallasEbgloncYKTCLYEDLH0988-40-28 10:50:01





             Test Item    Value        Reference Range Interpretation Comments

 

             Platelet (test code = Platelet) 367          133-450               

    



[facility] DallasIioqwnwGNYCKZABQX2874-98-91 10:50:01





             Test Item    Value        Reference Range Interpretation Comments

 

             MPV (test code = MPV) 7.8          7.4-10.4                  



[facility] DallasVhychxoAKZAUJOQKH3968-05-43 10:50:01





             Test Item    Value        Reference Range Interpretation Comments

 

             MCH (test code = MCH) 27.4 pg      27.0-31.0                 



[facility] DallasJsegdtqBNQFOXBOMQ3932-42-34 10:50:01





             Test Item    Value        Reference Range Interpretation Comments

 

             MCV (test code = MCV) 80.7         80.0-94.0                 



[facility] DallasRwtzcsuFQXFZGTWRI9890-52-07 10:50:01





             Test Item    Value        Reference Range Interpretation Comments

 

             MCHC (test code = MCHC) 34.0         32.0-36.0                 



[facility] DallasMvjezgtZOCVWEEXBC2375-90-11 10:50:01





             Test Item    Value        Reference Range Interpretation Comments

 

             RDW (test code = RDW) 18.9         11.5-14.5                 



[facility] DallasOfheqcdIMMWLEUAFQ5264-54-77 10:50:01





             Test Item    Value        Reference Range Interpretation Comments

 

             Hct (test code = Hct) 43.3         42.0-54.0                 



[facility] DallasJvybvwrNALNECJUUY4338-31-70 10:50:01





             Test Item    Value        Reference Range Interpretation Comments

 

             WBC (test code = WBC) 9.3          3.7-10.4                  



[facility] DallasHzsjugfBKXQWQVWQS6477-23-73 10:50:01





             Test Item    Value        Reference Range Interpretation Comments

 

             Hgb (test code = Hgb) 14.7         14.0-18.0                 



[facility] DallasZscqztbULVXDBNUIM9562-87-28 10:50:01





             Test Item    Value        Reference Range Interpretation Comments

 

             RBC (test code = RBC) 5.36         4.70-6.10                 



[facility] DallasOqaacydETFIGGIUCN9814-98-53 10:50:01





             Test Item    Value        Reference Range Interpretation Comments

 

             Eosinophils # (test code 0.2          See_Comment                [A

utomated message] The



             = Eosinophils #)                                        system whic

h generated



                                                                 this result tra

nsmitted



                                                                 reference range

: <=0.5.



                                                                 The reference r

zhen was



                                                                 not used to int

erpret



                                                                 this result as



                                                                 normal/abnormal

.



[facility] DallasFewdkveLQDBLTFJZP1219-62-80 10:50:01





             Test Item    Value        Reference Range Interpretation Comments

 

             Basophils # (test code 0.1          See_Comment                [Aut

omated message] The



             = Basophils #)                                        system which 

generated



                                                                 this result tra

nsmitted



                                                                 reference range

: <=0.2.



                                                                 The reference r

zhen was



                                                                 not used to int

erpret



                                                                 this result as



                                                                 normal/abnormal

.



[facility] DallasVcjxcquGJHPFZBVOS4713-23-46 10:50:01





             Test Item    Value        Reference Range Interpretation Comments

 

             Lymphocytes # (test code = Lymphocytes 1.8          1.0-5.5        

           



             #)                                                  



[facility] DallasJjzkvdhUVHLMVBGBC0433-94-36 10:50:01





             Test Item    Value        Reference Range Interpretation Comments

 

             Monocytes # (test code 0.9          See_Comment                [Aut

omated message] The



             = Monocytes #)                                        system which 

generated



                                                                 this result tra

nsmitted



                                                                 reference range

: <=0.8.



                                                                 The reference r

zhen was



                                                                 not used to int

erpret



                                                                 this result as



                                                                 normal/abnormal

.



[facility] DallasXgbqashEQPGZNVYBB2473-86-01 10:50:01





             Test Item    Value        Reference Range Interpretation Comments

 

             Neutrophils # (test code = Neutrophils 6.3          1.5-8.1        

           



             #)                                                  



[facility] DallasQmsvkbzORVZBETTJM4908-32-18 10:50:01





             Test Item    Value        Reference Range Interpretation Comments

 

             Eosinophils (test code = 2.0          See_Comment                [A

utomated message] The



             Eosinophils)                                        system which ge

nerated



                                                                 this result tra

nsmitted



                                                                 reference range

: <=4.0.



                                                                 The reference r

zhen was



                                                                 not used to int

erpret



                                                                 this result as



                                                                 normal/abnormal

.



[facility] DallasJambqdgTHSTRBRJVS5548-28-48 10:50:01





             Test Item    Value        Reference Range Interpretation Comments

 

             Segs (test code = Segs) 67.4         45.0-75.0                 



[facility] DallasBriizyyGFEHWJTWGT8521-29-25 10:50:01





             Test Item    Value        Reference Range Interpretation Comments

 

             Lymphocytes (test code = Lymphocytes) 19.9         20.0-40.0       

          



[facility] DallasGvaxmdgQBASHZZUZO7563-92-20 10:50:01





             Test Item    Value        Reference Range Interpretation Comments

 

             Basophils (test code = 1.0          See_Comment                [Aut

omated message] The



             Basophils)                                          system which ge

nerated



                                                                 this result tra

nsmitted



                                                                 reference range

: <=1.0.



                                                                 The reference r

zhen was



                                                                 not used to int

erpret



                                                                 this result as



                                                                 normal/abnormal

.



[facility] DallasTfqfwkkTYIXVOMROE9093-31-67 10:50:01





             Test Item    Value        Reference Range Interpretation Comments

 

             Monocytes (test code = Monocytes) 9.7          2.0-12.0            

      



[facility]2018-11-08 10:50:01





             Test Item    Value        Reference Range Interpretation Comments

 

             eGFR (test code = eGFR) 133                                    



[facility]2018-11-08 10:50:01





             Test Item    Value        Reference Range Interpretation Comments

 

             Calcium Lvl (test code = Calcium Lvl) 9.4          8.5-10.5        

          



[facility]2018-11-08 10:50:01





             Test Item    Value        Reference Range Interpretation Comments

 

             CO2 (test code = CO2) 28           24-32                     



[facility]2018-11-08 10:50:01





             Test Item    Value        Reference Range Interpretation Comments

 

             BUN (test code = BUN) 14           7-22                      



[facility]2018-11-08 10:50:01





             Test Item    Value        Reference Range Interpretation Comments

 

             Glucose Lvl (test code = Glucose Lvl) 89           70-99           

          



[facility]2018-11-08 10:50:01





             Test Item    Value        Reference Range Interpretation Comments

 

             Chloride Lvl (test code = Chloride Lvl) 104                  

            



[facility]2018-11-08 10:50:01





             Test Item    Value        Reference Range Interpretation Comments

 

             Potassium Lvl (test code = Potassium 4.2          3.5-5.1          

         



             Lvl)                                                



[facility]2018-11-08 10:50:01





             Test Item    Value        Reference Range Interpretation Comments

 

             Sodium Lvl (test code = Sodium Lvl) 137          135-145           

        



[facility]2018-11-08 10:50:01





             Test Item    Value        Reference Range Interpretation Comments

 

             Creatinine Lvl (test code = Creatinine 0.85         0.50-1.40      

           



             Lvl)                                                



[facility]2018-11-08 10:50:01





             Test Item    Value        Reference Range Interpretation Comments

 

             AGAP (test code = AGAP) 9.2          10.0-20.0                 



[facility] DallasWojxipbMDFOKDLUWQ7346-64-27 10:50:01





             Test Item    Value        Reference Range Interpretation Comments

 

             ACT (TEG) Rapid (test code = ACT (TEG) 136 s                 

           



             Rapid)                                              



[facility] DallasTtuvmvbSSSHPVHEUO6547-71-65 10:50:01





             Test Item    Value        Reference Range Interpretation Comments

 

             Split Point Rapid (test code = Split 0.6 min                       

         



             Point Rapid)                                        



[facility] DallasBhntxuwZTUUAYBRXG9839-50-93 10:50:01





             Test Item    Value        Reference Range Interpretation Comments

 

             R-time Rapid (test code = R-time 0.9 min      0.4-0.7              

     



             Rapid)                                              



[facility] DallasErvogbuELWIYFXWNI1756-66-24 10:50:01





             Test Item    Value        Reference Range Interpretation Comments

 

             K-time Rapid (test code = K-time 1.4 min      0.6-2.3              

     



             Rapid)                                              



[facility] DallasYcuhojzKWDZFLZJXM4018-55-38 10:50:01





             Test Item    Value        Reference Range Interpretation Comments

 

             Angle Rapid (test code = Angle 71 degrees   64-80                  

   



             Rapid)                                              



[place]9578-11-08 10:50:01





             Test Item    Value        Reference Range Interpretation Comments

 

             G-value Rapid (test code = G-value 12.7         5.0-11.6           

       



             Rapid)                                              



[facility] DallasTfhankqVDZVXGUHUC6237-72-92 10:50:01





             Test Item    Value        Reference Range Interpretation Comments

 

             Max Amplitude Rapid (test code = Max 72 mm        52-71            

         



             Amplitude Rapid)                                        



[facility] DallasRyeflxgTEJIGAZCRI7368-27-91 10:50:01





             Test Item    Value        Reference Range Interpretation Comments

 

             Estimated % Lysis Rapid 0.1          See_Comment                [Au

tomated message] The



             (test code = Estimated                                        syste

m which generated



             % Lysis Rapid)                                        this result t

ransmitted



                                                                 reference range

: <=7.5.



                                                                 The reference r

zhen was



                                                                 not used to int

erpret



                                                                 this result as



                                                                 normal/abnormal

.



[facility] DallasJkezgfdXNFKXEXOUS4219-63-12 10:50:01





             Test Item    Value        Reference Range Interpretation Comments

 

             Platelet (test code = Platelet) 367          133-450               

    



[facility] DallasIojbmqmQAZKZXSBWW3859-18-34 10:50:01





             Test Item    Value        Reference Range Interpretation Comments

 

             MPV (test code = MPV) 7.8          7.4-10.4                  



[facility] DallasUgylfogCQLOTNAMYA3643-21-72 10:50:01





             Test Item    Value        Reference Range Interpretation Comments

 

             MCH (test code = MCH) 27.4 pg      27.0-31.0                 



[facility] DallasZdirztgQHVVDCSFOF4863-10-90 10:50:01





             Test Item    Value        Reference Range Interpretation Comments

 

             MCV (test code = MCV) 80.7         80.0-94.0                 



[facility] [facility]XM[facility]DMQ4805[facility]32[facility]33 [facility]:50:01





             Test Item    Value        Reference Range Interpretation Comments

 

             MCHC (test code = MCHC) 34.0         32.0-36.0                 



[facility] DallasGclipkxZJPBKLPMUB8969-25-49 10:50:01





             Test Item    Value        Reference Range Interpretation Comments

 

             RDW (test code = RDW) 18.9         11.5-14.5                 



[facility] DallasZeycsiyKDEEINAWAR1078-94-74 10:50:01





             Test Item    Value        Reference Range Interpretation Comments

 

             Hct (test code = Hct) 43.3         42.0-54.0                 



[facility] DallasSgifoxaKZOEZASMUJ1804-63-67 10:50:01





             Test Item    Value        Reference Range Interpretation Comments

 

             WBC (test code = WBC) 9.3          3.7-10.4                  



[facility] DallasZmzlntrNAIJHCRUMD1950-19-77 10:50:01





             Test Item    Value        Reference Range Interpretation Comments

 

             Hgb (test code = Hgb) 14.7         14.0-18.0                 



[facility] DallasXwhlwgkLVILXQWFAM9728-00-68 10:50:01





             Test Item    Value        Reference Range Interpretation Comments

 

             RBC (test code = RBC) 5.36         4.70-6.10                 



[facility] DallasAwmxzfiUDRYLDKKGZ3953-39-67 10:50:01





             Test Item    Value        Reference Range Interpretation Comments

 

             Eosinophils # (test code 0.2          See_Comment                [A

utomated message] The



             = Eosinophils #)                                        system whic

h generated



                                                                 this result tra

nsmitted



                                                                 reference range

: <=0.5.



                                                                 The reference r

zhen was



                                                                 not used to int

erpret



                                                                 this result as



                                                                 normal/abnormal

.



[facility] DallasTfyneysLVONQNVUHO2209-88-13 10:50:01





             Test Item    Value        Reference Range Interpretation Comments

 

             Basophils # (test code 0.1          See_Comment                [Aut

omated message] The



             = Basophils #)                                        system which 

generated



                                                                 this result tra

nsmitted



                                                                 reference range

: <=0.2.



                                                                 The reference r

zhen was



                                                                 not used to int

erpret



                                                                 this result as



                                                                 normal/abnormal

.



[facility] DallasKtfaltsXQEWLQIXNN5503-02-89 10:50:01





             Test Item    Value        Reference Range Interpretation Comments

 

             Lymphocytes # (test code = Lymphocytes 1.8          1.0-5.5        

           



             #)                                                  



[facility] DallasMuonodlCNSBVROWLA6544-85-72 10:50:01





             Test Item    Value        Reference Range Interpretation Comments

 

             Monocytes # (test code 0.9          See_Comment                [Aut

omated message] The



             = Monocytes #)                                        system which 

generated



                                                                 this result tra

nsmitted



                                                                 reference range

: <=0.8.



                                                                 The reference r

zhen was



                                                                 not used to int

erpret



                                                                 this result as



                                                                 normal/abnormal

.



[facility] DallasTqvnclbSVLVXGTNIK2991-89-09 10:50:01





             Test Item    Value        Reference Range Interpretation Comments

 

             Neutrophils # (test code = Neutrophils 6.3          1.5-8.1        

           



             #)                                                  



[facility] DallasHouqxwnAWOGEMKPBI2016-95-15 10:50:01





             Test Item    Value        Reference Range Interpretation Comments

 

             Eosinophils (test code = 2.0          See_Comment                [A

utomated message] The



             Eosinophils)                                        system which ge

nerated



                                                                 this result tra

nsmitted



                                                                 reference range

: <=4.0.



                                                                 The reference r

zhen was



                                                                 not used to int

erpret



                                                                 this result as



                                                                 normal/abnormal

.



[facility] DallasYjizyofLGXMUXWNHK6639-44-51 10:50:01





             Test Item    Value        Reference Range Interpretation Comments

 

             Segs (test code = Segs) 67.4         45.0-75.0                 



[facility] DallasTmputsrGNQUJXYNSD9729-41-72 10:50:01





             Test Item    Value        Reference Range Interpretation Comments

 

             Lymphocytes (test code = Lymphocytes) 19.9         20.0-40.0       

          



[facility] DallasUblzqfrEFLDHBXRJK9966-65-34 10:50:01





             Test Item    Value        Reference Range Interpretation Comments

 

             Basophils (test code = 1.0          See_Comment                [Aut

omated message] The



             Basophils)                                          system which ge

nerated



                                                                 this result tra

nsmitted



                                                                 reference range

: <=1.0.



                                                                 The reference r

zhen was



                                                                 not used to int

erpret



                                                                 this result as



                                                                 normal/abnormal

.



[facility] DallasGyzurymUYCWLVUIMN8184-59-50 10:50:01





             Test Item    Value        Reference Range Interpretation Comments

 

             Monocytes (test code = Monocytes) 9.7          2.0-12.0            

      



[facility]2018-11-08 10:50:01





             Test Item    Value        Reference Range Interpretation Comments

 

             eGFR (test code = eGFR) 133                                    



[facility]2018-11-08 10:50:01





             Test Item    Value        Reference Range Interpretation Comments

 

             Calcium Lvl (test code = Calcium Lvl) 9.4          8.5-10.5        

          



[facility]2018-11-08 10:50:01





             Test Item    Value        Reference Range Interpretation Comments

 

             CO2 (test code = CO2) 28           24-32                     



[facility]2018-11-08 10:50:01





             Test Item    Value        Reference Range Interpretation Comments

 

             BUN (test code = BUN) 14           7-22                      



[facility]2018-11-08 10:50:01





             Test Item    Value        Reference Range Interpretation Comments

 

             Glucose Lvl (test code = Glucose Lvl) 89           70-99           

          



[facility]2018-11-08 10:50:01





             Test Item    Value        Reference Range Interpretation Comments

 

             Chloride Lvl (test code = Chloride Lvl) 104                  

            



[facility]2018-11-08 10:50:01





             Test Item    Value        Reference Range Interpretation Comments

 

             Potassium Lvl (test code = Potassium 4.2          3.5-5.1          

         



             Lvl)                                                



[facility]2018-11-08 10:50:01





             Test Item    Value        Reference Range Interpretation Comments

 

             Sodium Lvl (test code = Sodium Lvl) 137          135-145           

        



[facility]2018-11-08 10:50:01





             Test Item    Value        Reference Range Interpretation Comments

 

             Creatinine Lvl (test code = Creatinine 0.85         0.50-1.40      

           



             Lvl)                                                



[facility]2018-11-08 10:50:01





             Test Item    Value        Reference Range Interpretation Comments

 

             AGAP (test code = AGAP) 9.2          10.0-20.0                 



[facility] DallasKjhbphiRDGRDIHJGW9765-22-37 10:50:01





             Test Item    Value        Reference Range Interpretation Comments

 

             ACT (TEG) Rapid (test code = ACT (TEG) 136 s                 

           



             Rapid)                                              



[facility] DallasEeraowtAGOURAYIWU3770-81-88 10:50:01





             Test Item    Value        Reference Range Interpretation Comments

 

             Split Point Rapid (test code = Split 0.6 min                       

         



             Point Rapid)                                        



[facility] DallasEbrrawoXNJYHCDNSM8448-08-57 10:50:01





             Test Item    Value        Reference Range Interpretation Comments

 

             R-time Rapid (test code = R-time 0.9 min      0.4-0.7              

     



             Rapid)                                              



[facility] DallasRlgznvgHYWRRBMAPZ7933-22-94 10:50:01





             Test Item    Value        Reference Range Interpretation Comments

 

             K-time Rapid (test code = K-time 1.4 min      0.6-2.3              

     



             Rapid)                                              



[facility] DallasRvrgnhaPKBZICZHML1889-93-22 10:50:01





             Test Item    Value        Reference Range Interpretation Comments

 

             Angle Rapid (test code = Angle 71 degrees   64-80                  

   



             Rapid)                                              



[facility] DallasZtncmbwWEMVJNFZAZ7090-58-02 10:50:01





             Test Item    Value        Reference Range Interpretation Comments

 

             G-value Rapid (test code = G-value 12.7         5.0-11.6           

       



             Rapid)                                              



[facility] DallasDfvfqvuTPSRPKNFJH1701-28-74 10:50:01





             Test Item    Value        Reference Range Interpretation Comments

 

             Max Amplitude Rapid (test code = Max 72 mm        52-71            

         



             Amplitude Rapid)                                        



[facility] DallasFypsjbdTFKINCOWSR9812-25-39 10:50:01





             Test Item    Value        Reference Range Interpretation Comments

 

             Estimated % Lysis Rapid 0.1          See_Comment                [Au

tomated message] The



             (test code = Estimated                                        syste

m which generated



             % Lysis Rapid)                                        this result t

ransmitted



                                                                 reference range

: <=7.5.



                                                                 The reference r

zhen was



                                                                 not used to int

erpret



                                                                 this result as



                                                                 normal/abnormal

.



[facility] DallasLrzgsloYZEUROEMFG0669-96-07 10:50:01





             Test Item    Value        Reference Range Interpretation Comments

 

             Platelet (test code = Platelet) 367          133-450               

    



[facility] DallasFpktgvaAKZKPXIDQI4119-28-15 10:50:01





             Test Item    Value        Reference Range Interpretation Comments

 

             MPV (test code = MPV) 7.8          7.4-10.4                  



[facility] DallasOecqondGMSFHPEKXF9618-34-97 10:50:01





             Test Item    Value        Reference Range Interpretation Comments

 

             MCH (test code = MCH) 27.4 pg      27.0-31.0                 



[facility] DallasNlycdxcUIRAQFJHQZ5421-09-62 10:50:01





             Test Item    Value        Reference Range Interpretation Comments

 

             MCV (test code = MCV) 80.7         80.0-94.0                 



[facility] DallasBiwbdaaVFDHWDYWZU9909-52-42 10:50:01





             Test Item    Value        Reference Range Interpretation Comments

 

             MCHC (test code = MCHC) 34.0         32.0-36.0                 



[facility] DallasNwmhcvjRXJXUUQKYT5858-54-68 10:50:01





             Test Item    Value        Reference Range Interpretation Comments

 

             RDW (test code = RDW) 18.9         11.5-14.5                 



[facility] DallasJjaxkgyTOLVRFWZXK0253-53-55 10:50:01





             Test Item    Value        Reference Range Interpretation Comments

 

             Hct (test code = Hct) 43.3         42.0-54.0                 



[facility] DallasUwmbgkoBUYQYGJILH0785-27-84 10:50:01





             Test Item    Value        Reference Range Interpretation Comments

 

             WBC (test code = WBC) 9.3          3.7-10.4                  



[facility] DallasVrewhwyJEOEWYEZEW1570-23-40 10:50:01





             Test Item    Value        Reference Range Interpretation Comments

 

             Hgb (test code = Hgb) 14.7         14.0-18.0                 



[facility] DallasGhoygoaOWDHPBGCAI6278-36-93 10:50:01





             Test Item    Value        Reference Range Interpretation Comments

 

             RBC (test code = RBC) 5.36         4.70-6.10                 



[facility] DallasXjnatktOMKQJAUJJU4526-84-66 10:50:01





             Test Item    Value        Reference Range Interpretation Comments

 

             Eosinophils # (test code 0.2          See_Comment                [A

utomated message] The



             = Eosinophils #)                                        system whic

h generated



                                                                 this result tra

nsmitted



                                                                 reference range

: <=0.5.



                                                                 The reference r

zhen was



                                                                 not used to int

erpret



                                                                 this result as



                                                                 normal/abnormal

.



[facility] DallasTbbslicGWQVLKHDUO2382-45-79 10:50:01





             Test Item    Value        Reference Range Interpretation Comments

 

             Basophils # (test code 0.1          See_Comment                [Aut

omated message] The



             = Basophils #)                                        system which 

generated



                                                                 this result tra

nsmitted



                                                                 reference range

: <=0.2.



                                                                 The reference r

zhen was



                                                                 not used to int

erpret



                                                                 this result as



                                                                 normal/abnormal

.



[facility] DallasUigyetvISRZXKVFBQ0137-51-84 10:50:01





             Test Item    Value        Reference Range Interpretation Comments

 

             Lymphocytes # (test code = Lymphocytes 1.8          1.0-5.5        

           



             #)                                                  



[facility] DallasEnhieusXWMEDFUSBR7102-90-09 10:50:01





             Test Item    Value        Reference Range Interpretation Comments

 

             Monocytes # (test code 0.9          See_Comment                [Aut

omated message] The



             = Monocytes #)                                        system which 

generated



                                                                 this result tra

nsmitted



                                                                 reference range

: <=0.8.



                                                                 The reference r

zhen was



                                                                 not used to int

erpret



                                                                 this result as



                                                                 normal/abnormal

.



[facility] DallasDblrohtINQSRVNULE8505-10-52 10:50:01





             Test Item    Value        Reference Range Interpretation Comments

 

             Neutrophils # (test code = Neutrophils 6.3          1.5-8.1        

           



             #)                                                  



[facility] DallasFqmaanqPQPBUIPMXX2371-38-16 10:50:01





             Test Item    Value        Reference Range Interpretation Comments

 

             Eosinophils (test code = 2.0          See_Comment                [A

utomated message] The



             Eosinophils)                                        system which ge

nerated



                                                                 this result tra

nsmitted



                                                                 reference range

: <=4.0.



                                                                 The reference r

zhen was



                                                                 not used to int

erpret



                                                                 this result as



                                                                 normal/abnormal

.



[facility] DallasMjidakeINBYVWWZCR3093-19-50 10:50:01





             Test Item    Value        Reference Range Interpretation Comments

 

             Segs (test code = Segs) 67.4         45.0-75.0                 



[facility] DallasCqmzbqvOPNDVXRFAG2346-82-65 10:50:01





             Test Item    Value        Reference Range Interpretation Comments

 

             Lymphocytes (test code = Lymphocytes) 19.9         20.0-40.0       

          



[facility] DallasWevkcayFRTPHPRSSD6835-42-66 10:50:01





             Test Item    Value        Reference Range Interpretation Comments

 

             Basophils (test code = 1.0          See_Comment                [Aut

omated message] The



             Basophils)                                          system which ge

nerated



                                                                 this result tra

nsmitted



                                                                 reference range

: <=1.0.



                                                                 The reference r

zhen was



                                                                 not used to int

erpret



                                                                 this result as



                                                                 normal/abnormal

.



[facility] DallasYtyudryOQELBXZTCW9434-64-65 10:50:01





             Test Item    Value        Reference Range Interpretation Comments

 

             Monocytes (test code = Monocytes) 9.7          2.0-12.0            

      



[facility]2018-11-08 10:50:01





             Test Item    Value        Reference Range Interpretation Comments

 

             eGFR (test code = eGFR) 133                                    



[facility]2018-11-08 10:50:01





             Test Item    Value        Reference Range Interpretation Comments

 

             Calcium Lvl (test code = Calcium Lvl) 9.4          8.5-10.5        

          



[facility]2018-11-08 10:50:01





             Test Item    Value        Reference Range Interpretation Comments

 

             CO2 (test code = CO2) 28           24-32                     



[facility]2018-11-08 10:50:01





             Test Item    Value        Reference Range Interpretation Comments

 

             BUN (test code = BUN) 14           7-22                      



[facility]2018-11-08 10:50:01





             Test Item    Value        Reference Range Interpretation Comments

 

             Glucose Lvl (test code = Glucose Lvl) 89           70-99           

          



[facility]2018-11-08 10:50:01





             Test Item    Value        Reference Range Interpretation Comments

 

             Chloride Lvl (test code = Chloride Lvl) 104                  

            



[facility]2018-11-08 10:50:01





             Test Item    Value        Reference Range Interpretation Comments

 

             Potassium Lvl (test code = Potassium 4.2          3.5-5.1          

         



             Lvl)                                                



[facility]2018-11-08 10:50:01





             Test Item    Value        Reference Range Interpretation Comments

 

             Sodium Lvl (test code = Sodium Lvl) 137          135-145           

        



[facility]2018-11-08 10:50:01





             Test Item    Value        Reference Range Interpretation Comments

 

             Creatinine Lvl (test code = Creatinine 0.85         0.50-1.40      

           



             Lvl)                                                



[facility]2018-11-08 10:50:01





             Test Item    Value        Reference Range Interpretation Comments

 

             AGAP (test code = AGAP) 9.2          10.0-20.0                 



[facility] DallasVcosgozHWCKGPSOGJ1207-57-74 10:50:01





             Test Item    Value        Reference Range Interpretation Comments

 

             ACT (TEG) Rapid (test code = ACT (TEG) 136 s                 

           



             Rapid)                                              



[facility] DallasPggxhbqOOYVUHAUGO9138-13-75 10:50:01





             Test Item    Value        Reference Range Interpretation Comments

 

             Split Point Rapid (test code = Split 0.6 min                       

         



             Point Rapid)                                        



[facility] DallasOsnkxdnNEURZGCRYF2425-13-46 10:50:01





             Test Item    Value        Reference Range Interpretation Comments

 

             R-time Rapid (test code = R-time 0.9 min      0.4-0.7              

     



             Rapid)                                              



[address]11-08 10:50:01





             Test Item    Value        Reference Range Interpretation Comments

 

             K-time Rapid (test code = K-time 1.4 min      0.6-2.3              

     



             Rapid)                                              



[facility] DallasKvepbrpZGWAYGHMNP1103-09-45 10:50:01





             Test Item    Value        Reference Range Interpretation Comments

 

             Angle Rapid (test code = Angle 71 degrees   64-80                  

   



             Rapid)                                              



[facility] DallasSdlluleUVJWYDEBTW4624-35-02 10:50:01





             Test Item    Value        Reference Range Interpretation Comments

 

             G-value Rapid (test code = G-value 12.7         5.0-11.6           

       



             Rapid)                                              



[facility] DallasSodccbcERHBZFGHLJ3583-17-94 10:50:01





             Test Item    Value        Reference Range Interpretation Comments

 

             Max Amplitude Rapid (test code = Max 72 mm        52-71            

         



             Amplitude Rapid)                                        



[facility] DallasApsyqphADCYZGDOZD8619-67-96 10:50:01





             Test Item    Value        Reference Range Interpretation Comments

 

             Estimated % Lysis Rapid 0.1          See_Comment                [Au

tomated message] The



             (test code = Estimated                                        syste

m which generated



             % Lysis Rapid)                                        this result t

ransmitted



                                                                 reference range

: <=7.5.



                                                                 The reference r

zhen was



                                                                 not used to int

erpret



                                                                 this result as



                                                                 normal/abnormal

.



[facility] DallasKlusspyCZJZBUPKLU9052-04-68 10:50:01





             Test Item    Value        Reference Range Interpretation Comments

 

             Platelet (test code = Platelet) 367          133-450               

    



[facility] DallasBmwjxbmPNJRMJVJVV5289-29-67 10:50:01





             Test Item    Value        Reference Range Interpretation Comments

 

             MPV (test code = MPV) 7.8          7.4-10.4                  



[facility] DallasZutldrdAEPDJSVZKZ6927-16-78 10:50:01





             Test Item    Value        Reference Range Interpretation Comments

 

             MCH (test code = MCH) 27.4 pg      27.0-31.0                 



[facility] DallasEykoencZUIMQGZKFO3678-66-62 10:50:01





             Test Item    Value        Reference Range Interpretation Comments

 

             MCV (test code = MCV) 80.7         80.0-94.0                 



[facility] DallasQcrurizFTEZZYQUIQ9299-96-37 10:50:01





             Test Item    Value        Reference Range Interpretation Comments

 

             MCHC (test code = MCHC) 34.0         32.0-36.0                 



[facility] DallasYfxcawsIQYBPXDDVR6918-20-66 10:50:01





             Test Item    Value        Reference Range Interpretation Comments

 

             RDW (test code = RDW) 18.9         11.5-14.5                 



[facility] DallasJjzvgepMKSZUPKVMS8362-99-39 10:50:01





             Test Item    Value        Reference Range Interpretation Comments

 

             Hct (test code = Hct) 43.3         42.0-54.0                 



[facility] DallasQxoeaubEHHKWOWKGK0094-13-86 10:50:01





             Test Item    Value        Reference Range Interpretation Comments

 

             WBC (test code = WBC) 9.3          3.7-10.4                  



[facility] DallasUazginjUEKSAHYNCK7341-68-30 10:50:01





             Test Item    Value        Reference Range Interpretation Comments

 

             Hgb (test code = Hgb) 14.7         14.0-18.0                 



[facility] DallasDzoanhjYPKBXUTZNJ7498-97-25 10:50:01





             Test Item    Value        Reference Range Interpretation Comments

 

             RBC (test code = RBC) 5.36         4.70-6.10                 



[facility] DallasWmluxnxDGQJQUVHXO7775-50-07 10:50:01





             Test Item    Value        Reference Range Interpretation Comments

 

             Eosinophils # (test code 0.2          See_Comment                [A

utomated message] The



             = Eosinophils #)                                        system whic

h generated



                                                                 this result tra

nsmitted



                                                                 reference range

: <=0.5.



                                                                 The reference r

zhen was



                                                                 not used to int

erpret



                                                                 this result as



                                                                 normal/abnormal

.



[facility] DallasQloyahcYWZALNGFVS5834-16-85 10:50:01





             Test Item    Value        Reference Range Interpretation Comments

 

             Basophils # (test code 0.1          See_Comment                [Aut

omated message] The



             = Basophils #)                                        system which 

generated



                                                                 this result tra

nsmitted



                                                                 reference range

: <=0.2.



                                                                 The reference r

zhen was



                                                                 not used to int

erpret



                                                                 this result as



                                                                 normal/abnormal

.



[facility] DallasYqzppvzCMCDFMMGCA8033-34-36 10:50:01





             Test Item    Value        Reference Range Interpretation Comments

 

             Lymphocytes # (test code = Lymphocytes 1.8          1.0-5.5        

           



             #)                                                  



[facility] DallasRolwozeFEXZVXTFTD3529-09-72 10:50:01





             Test Item    Value        Reference Range Interpretation Comments

 

             Monocytes # (test code 0.9          See_Comment                [Aut

omated message] The



             = Monocytes #)                                        system which 

generated



                                                                 this result tra

nsmitted



                                                                 reference range

: <=0.8.



                                                                 The reference r

zhen was



                                                                 not used to int

erpret



                                                                 this result as



                                                                 normal/abnormal

.



[facility] DallasXpcviicWCFTTPJFEY7431-96-01 10:50:01





             Test Item    Value        Reference Range Interpretation Comments

 

             Neutrophils # (test code = Neutrophils 6.3          1.5-8.1        

           



             #)                                                  



[facility] DallasYgngwawGMVRHCJORO3443-99-18 10:50:01





             Test Item    Value        Reference Range Interpretation Comments

 

             Eosinophils (test code = 2.0          See_Comment                [A

utomated message] The



             Eosinophils)                                        system which ge

nerated



                                                                 this result tra

nsmitted



                                                                 reference range

: <=4.0.



                                                                 The reference r

zhen was



                                                                 not used to int

erpret



                                                                 this result as



                                                                 normal/abnormal

.



[facility] DallasYjloendSQGQAURPYP8116-56-41 10:50:01





             Test Item    Value        Reference Range Interpretation Comments

 

             Segs (test code = Segs) 67.4         45.0-75.0                 



[facility] DallasIfsbqmhVXMYUIJWPS8608-43-97 10:50:01





             Test Item    Value        Reference Range Interpretation Comments

 

             Lymphocytes (test code = Lymphocytes) 19.9         20.0-40.0       

          



[facility] DallasYmkimhoUJMJGWQVLG6184-13-75 10:50:01





             Test Item    Value        Reference Range Interpretation Comments

 

             Basophils (test code = 1.0          See_Comment                [Aut

omated message] The



             Basophils)                                          system which ge

nerated



                                                                 this result tra

nsmitted



                                                                 reference range

: <=1.0.



                                                                 The reference r

zhen was



                                                                 not used to int

erpret



                                                                 this result as



                                                                 normal/abnormal

.



[facility] DallasMbnoakeVZFAJFFDDC3591-23-52 10:50:01





             Test Item    Value        Reference Range Interpretation Comments

 

             Monocytes (test code = Monocytes) 9.7          2.0-12.0            

      



[facility]2018-11-08 10:50:01





             Test Item    Value        Reference Range Interpretation Comments

 

             eGFR (test code = eGFR) 133                                    



[facility]2018-11-08 10:50:01





             Test Item    Value        Reference Range Interpretation Comments

 

             Calcium Lvl (test code = Calcium Lvl) 9.4          8.5-10.5        

          



[facility]2018-11-08 10:50:01





             Test Item    Value        Reference Range Interpretation Comments

 

             CO2 (test code = CO2) 28           24-32                     



[facility]2018-11-08 10:50:01





             Test Item    Value        Reference Range Interpretation Comments

 

             BUN (test code = BUN) 14           7-22                      



[facility]2018-11-08 10:50:01





             Test Item    Value        Reference Range Interpretation Comments

 

             Glucose Lvl (test code = Glucose Lvl) 89           70-99           

          



[facility]2018-11-08 10:50:01





             Test Item    Value        Reference Range Interpretation Comments

 

             Chloride Lvl (test code = Chloride Lvl) 104                  

            



[facility]2018-11-08 10:50:01





             Test Item    Value        Reference Range Interpretation Comments

 

             Potassium Lvl (test code = Potassium 4.2          3.5-5.1          

         



             Lvl)                                                



[facility]2018-11-08 10:50:01





             Test Item    Value        Reference Range Interpretation Comments

 

             Sodium Lvl (test code = Sodium Lvl) 137          135-145           

        



[facility]2018-11-08 10:50:01





             Test Item    Value        Reference Range Interpretation Comments

 

             Creatinine Lvl (test code = Creatinine 0.85         0.50-1.40      

           



             Lvl)                                                



[facility]2018-11-08 10:50:01





             Test Item    Value        Reference Range Interpretation Comments

 

             AGAP (test code = AGAP) 9.2          10.0-20.0                 



[facility] DallasFgrtvcaIXOOOTSTVL5012-16-16 10:50:01





             Test Item    Value        Reference Range Interpretation Comments

 

             ACT (TEG) Rapid (test code = ACT (TEG) 136 s                 

           



             Rapid)                                              



[facility] DallasVypnwdiUSGUTTQFTH8036-20-09 10:50:01





             Test Item    Value        Reference Range Interpretation Comments

 

             Split Point Rapid (test code = Split 0.6 min                       

         



             Point Rapid)                                        



[facility] DallasNdiuvlxQJNOABVWTZ5083-15-29 10:50:01





             Test Item    Value        Reference Range Interpretation Comments

 

             R-time Rapid (test code = R-time 0.9 min      0.4-0.7              

     



             Rapid)                                              



[place] [address]-11-08 10:50:01





             Test Item    Value        Reference Range Interpretation Comments

 

             K-time Rapid (test code = K-time 1.4 min      0.6-2.3              

     



             Rapid)                                              



[facility] DallasFusqdaaLNQLLIPPYT0822-06-98 10:50:01





             Test Item    Value        Reference Range Interpretation Comments

 

             Angle Rapid (test code = Angle 71 degrees   64-80                  

   



             Rapid)                                              



[facility] DallasTumfdocUOPRHLWLUU7024-09-95 10:50:01





             Test Item    Value        Reference Range Interpretation Comments

 

             G-value Rapid (test code = G-value 12.7         5.0-11.6           

       



             Rapid)                                              



[facility] DallasRrabhdoBQGTVBVLZO4249-71-80 10:50:01





             Test Item    Value        Reference Range Interpretation Comments

 

             Max Amplitude Rapid (test code = Max 72 mm        52-71            

         



             Amplitude Rapid)                                        



[facility] DallasUktefsaGOZSARMLQW4508-80-94 10:50:01





             Test Item    Value        Reference Range Interpretation Comments

 

             Estimated % Lysis Rapid 0.1          See_Comment                [Au

tomated message] The



             (test code = Estimated                                        syste

m which generated



             % Lysis Rapid)                                        this result t

ransmitted



                                                                 reference range

: <=7.5.



                                                                 The reference r

zhen was



                                                                 not used to int

erpret



                                                                 this result as



                                                                 normal/abnormal

.



[facility] DallasJkoucluAVHIFQDXZY4206-90-13 10:50:01





             Test Item    Value        Reference Range Interpretation Comments

 

             Platelet (test code = Platelet) 367          133-450               

    



[facility] DallasJyyfmhfTUYRVEERZJ8199-77-59 10:50:01





             Test Item    Value        Reference Range Interpretation Comments

 

             MPV (test code = MPV) 7.8          7.4-10.4                  



[facility] DallasLbaifznAFSHQATTSU2150-91-85 10:50:01





             Test Item    Value        Reference Range Interpretation Comments

 

             MCH (test code = MCH) 27.4 pg      27.0-31.0                 



[facility] DallasQgiacyuMGLCYAWQBN7408-63-71 10:50:01





             Test Item    Value        Reference Range Interpretation Comments

 

             MCV (test code = MCV) 80.7         80.0-94.0                 



[facility] DallasHvrfdeaPIPUUEXFXT2916-01-23 10:50:01





             Test Item    Value        Reference Range Interpretation Comments

 

             MCHC (test code = MCHC) 34.0         32.0-36.0                 



[facility] DallasVrwwjxmTZGATAAGYJ0639-95-18 10:50:01





             Test Item    Value        Reference Range Interpretation Comments

 

             RDW (test code = RDW) 18.9         11.5-14.5                 



[facility] DallasBzkwnctWAICBLAPEQ4283-82-15 10:50:01





             Test Item    Value        Reference Range Interpretation Comments

 

             Hct (test code = Hct) 43.3         42.0-54.0                 



[facility] DallasYxijurgFXZQORYDIC7885-52-62 10:50:01





             Test Item    Value        Reference Range Interpretation Comments

 

             WBC (test code = WBC) 9.3          3.7-10.4                  



[facility] DallasHeiugdpAXFDUSXIZD9096-62-06 10:50:01





             Test Item    Value        Reference Range Interpretation Comments

 

             Hgb (test code = Hgb) 14.7         14.0-18.0                 



[facility] DallasGgvnftfRQRALPBVAV7282-76-31 10:50:01





             Test Item    Value        Reference Range Interpretation Comments

 

             RBC (test code = RBC) 5.36         4.70-6.10                 



[facility] DallasUtoiwvwSPIAIRECUX2178-91-94 10:50:01





             Test Item    Value        Reference Range Interpretation Comments

 

             Eosinophils # (test code 0.2          See_Comment                [A

utomated message] The



             = Eosinophils #)                                        system whic

h generated



                                                                 this result tra

nsmitted



                                                                 reference range

: <=0.5.



                                                                 The reference r

zhen was



                                                                 not used to int

erpret



                                                                 this result as



                                                                 normal/abnormal

.



[facility] DallasVeixkfbIFOVZZHNWL9750-14-14 10:50:01





             Test Item    Value        Reference Range Interpretation Comments

 

             Basophils # (test code 0.1          See_Comment                [Aut

omated message] The



             = Basophils #)                                        system which 

generated



                                                                 this result tra

nsmitted



                                                                 reference range

: <=0.2.



                                                                 The reference r

zhen was



                                                                 not used to int

erpret



                                                                 this result as



                                                                 normal/abnormal

.



[facility] DallasRhwrwmgXTVTHGPCSW0106-98-13 10:50:01





             Test Item    Value        Reference Range Interpretation Comments

 

             Lymphocytes # (test code = Lymphocytes 1.8          1.0-5.5        

           



             #)                                                  



[facility] DallasSabsmxqLSJAXAKBEC3619-95-01 10:50:01





             Test Item    Value        Reference Range Interpretation Comments

 

             Monocytes # (test code 0.9          See_Comment                [Aut

omated message] The



             = Monocytes #)                                        system which 

generated



                                                                 this result tra

nsmitted



                                                                 reference range

: <=0.8.



                                                                 The reference r

zhen was



                                                                 not used to int

erpret



                                                                 this result as



                                                                 normal/abnormal

.



[facility] DallasVvguvwpADUVLZMPYS9730-45-51 10:50:01





             Test Item    Value        Reference Range Interpretation Comments

 

             Neutrophils # (test code = Neutrophils 6.3          1.5-8.1        

           



             #)                                                  



[facility] DallasLlznjwcOSGLLENISI8222-61-58 10:50:01





             Test Item    Value        Reference Range Interpretation Comments

 

             Eosinophils (test code = 2.0          See_Comment                [A

utomated message] The



             Eosinophils)                                        system which ge

nerated



                                                                 this result tra

nsmitted



                                                                 reference range

: <=4.0.



                                                                 The reference r

zhen was



                                                                 not used to int

erpret



                                                                 this result as



                                                                 normal/abnormal

.



[facility] DallasVvdbkdeSQBYVPTQIG3780-12-51 10:50:01





             Test Item    Value        Reference Range Interpretation Comments

 

             Segs (test code = Segs) 67.4         45.0-75.0                 



[facility] DallasBktoetvQUYXMWBCXT8176-39-64 10:50:01





             Test Item    Value        Reference Range Interpretation Comments

 

             Lymphocytes (test code = Lymphocytes) 19.9         20.0-40.0       

          



[facility] DallasZncurhrVIZVWPQTAU5590-21-66 10:50:01





             Test Item    Value        Reference Range Interpretation Comments

 

             Basophils (test code = 1.0          See_Comment                [Aut

omated message] The



             Basophils)                                          system which ge

nerated



                                                                 this result tra

nsmitted



                                                                 reference range

: <=1.0.



                                                                 The reference r

zhen was



                                                                 not used to int

erpret



                                                                 this result as



                                                                 normal/abnormal

.



[facility] DallasNkpxhbnNLLLFTIKDC9280-28-30 10:50:01





             Test Item    Value        Reference Range Interpretation Comments

 

             Monocytes (test code = Monocytes) 9.7          2.0-12.0            

      



[facility]2018-11-08 10:50:01





             Test Item    Value        Reference Range Interpretation Comments

 

             eGFR (test code = eGFR) 133                                    



[facility]2018-11-08 10:50:01





             Test Item    Value        Reference Range Interpretation Comments

 

             Calcium Lvl (test code = Calcium Lvl) 9.4          8.5-10.5        

          



[facility]2018-11-08 10:50:01





             Test Item    Value        Reference Range Interpretation Comments

 

             CO2 (test code = CO2) 28           24-32                     



[facility]2018-11-08 10:50:01





             Test Item    Value        Reference Range Interpretation Comments

 

             BUN (test code = BUN) 14           7-22                      



[facility]2018-11-08 10:50:01





             Test Item    Value        Reference Range Interpretation Comments

 

             Glucose Lvl (test code = Glucose Lvl) 89           70-99           

          



[facility]2018-11-08 10:50:01





             Test Item    Value        Reference Range Interpretation Comments

 

             Chloride Lvl (test code = Chloride Lvl) 104                  

            



[facility]2018-11-08 10:50:01





             Test Item    Value        Reference Range Interpretation Comments

 

             Potassium Lvl (test code = Potassium 4.2          3.5-5.1          

         



             Lvl)                                                



[facility]2018-11-08 10:50:01





             Test Item    Value        Reference Range Interpretation Comments

 

             Sodium Lvl (test code = Sodium Lvl) 137          135-145           

        



[facility]2018-11-08 10:50:01





             Test Item    Value        Reference Range Interpretation Comments

 

             Creatinine Lvl (test code = Creatinine 0.85         0.50-1.40      

           



             Lvl)                                                



[facility]2018-11-08 10:50:01





             Test Item    Value        Reference Range Interpretation Comments

 

             AGAP (test code = AGAP) 9.2          10.0-20.0                 



[facility]VEHLKVOK4700[facility]84[facility]47 10:50:01





             Test Item    Value        Reference Range Interpretation Comments

 

             ACT (TEG) Rapid (test code = ACT (TEG) 136 s                 

           



             Rapid)                                              



[facility] DallasMwbhmztLKILUTDKFQ2925-68-65 10:50:01





             Test Item    Value        Reference Range Interpretation Comments

 

             Split Point Rapid (test code = Split 0.6 min                       

         



             Point Rapid)                                        



[facility] DallasUwajirfVTVBSMESKX0184-66-35 10:50:01





             Test Item    Value        Reference Range Interpretation Comments

 

             R-time Rapid (test code = R-time 0.9 min      0.4-0.7              

     



             Rapid)                                              



[facility] DallasZpuhhveWRKHMOCQRE5293-27-91 10:50:01





             Test Item    Value        Reference Range Interpretation Comments

 

             K-time Rapid (test code = K-time 1.4 min      0.6-2.3              

     



             Rapid)                                              



[facility] DallasSyjpmwrRGJYSYNUEE0602-30-61 10:50:01





             Test Item    Value        Reference Range Interpretation Comments

 

             Angle Rapid (test code = Angle 71 degrees   64-80                  

   



             Rapid)                                              



[facility] DallasWzdvtygKGCDRKNUSY5281-31-96 10:50:01





             Test Item    Value        Reference Range Interpretation Comments

 

             G-value Rapid (test code = G-value 12.7         5.0-11.6           

       



             Rapid)                                              



[facility] DallasArukfqbKHDTWPEYDQ8583-75-29 10:50:01





             Test Item    Value        Reference Range Interpretation Comments

 

             Max Amplitude Rapid (test code = Max 72 mm        52-71            

         



             Amplitude Rapid)                                        



[facility] DallasUhqasnyUQDNOGONMI5133-35-85 10:50:01





             Test Item    Value        Reference Range Interpretation Comments

 

             Estimated % Lysis Rapid 0.1          See_Comment                [Au

tomated message] The



             (test code = Estimated                                        syste

m which generated



             % Lysis Rapid)                                        this result t

ransmitted



                                                                 reference range

: <=7.5.



                                                                 The reference r

zhen was



                                                                 not used to int

erpret



                                                                 this result as



                                                                 normal/abnormal

.



[facility] DallasEpgtekqAAXRUDYARJ0535-15-39 10:50:01





             Test Item    Value        Reference Range Interpretation Comments

 

             Platelet (test code = Platelet) 367          133-450               

    



[facility] DallasWmlgvmePCNPZTQOVX3880-83-88 10:50:01





             Test Item    Value        Reference Range Interpretation Comments

 

             MPV (test code = MPV) 7.8          7.4-10.4                  



[facility] DallasFmclrvkRACULLQMSJ3392-39-08 10:50:01





             Test Item    Value        Reference Range Interpretation Comments

 

             MCH (test code = MCH) 27.4 pg      27.0-31.0                 



[facility] DallasLurrjfzCMKLPOVJMR0695-78-57 10:50:01





             Test Item    Value        Reference Range Interpretation Comments

 

             MCV (test code = MCV) 80.7         80.0-94.0                 



[facility] DallasCzcxpavFHOIBETWTX7996-49-70 10:50:01





             Test Item    Value        Reference Range Interpretation Comments

 

             MCHC (test code = MCHC) 34.0         32.0-36.0                 



[facility] DallasUdpxdphOCPHQTDTPH5530-41-79 10:50:01





             Test Item    Value        Reference Range Interpretation Comments

 

             RDW (test code = RDW) 18.9         11.5-14.5                 



[facility] DallasOqloymgSQSYJBOGWB8602-92-54 10:50:01





             Test Item    Value        Reference Range Interpretation Comments

 

             Hct (test code = Hct) 43.3         42.0-54.0                 



[facility] DallasJffpizqZAGAVLIDEF2645-61-53 10:50:01





             Test Item    Value        Reference Range Interpretation Comments

 

             WBC (test code = WBC) 9.3          3.7-10.4                  



[facility] DallasQernggcDRKIGCUEMZ3195-51-38 10:50:01





             Test Item    Value        Reference Range Interpretation Comments

 

             Hgb (test code = Hgb) 14.7         14.0-18.0                 



[facility] DallasAioavgeIXUEIXCDXX6025-37-69 10:50:01





             Test Item    Value        Reference Range Interpretation Comments

 

             RBC (test code = RBC) 5.36         4.70-6.10                 



[facility] DallasNzlolxyWCKQMRDMMF6497-46-53 10:50:01





             Test Item    Value        Reference Range Interpretation Comments

 

             Eosinophils # (test code 0.2          See_Comment                [A

utomated message] The



             = Eosinophils #)                                        system whic

h generated



                                                                 this result tra

nsmitted



                                                                 reference range

: <=0.5.



                                                                 The reference r

zhen was



                                                                 not used to int

erpret



                                                                 this result as



                                                                 normal/abnormal

.



[facility] DallasPnjjwzyITRCYAGMHQ1469-55-54 10:50:01





             Test Item    Value        Reference Range Interpretation Comments

 

             Basophils # (test code 0.1          See_Comment                [Aut

omated message] The



             = Basophils #)                                        system which 

generated



                                                                 this result tra

nsmitted



                                                                 reference range

: <=0.2.



                                                                 The reference r

zhen was



                                                                 not used to int

erpret



                                                                 this result as



                                                                 normal/abnormal

.



[facility] DallasEihdjwpFILJXJOYOM3117-94-53 10:50:01





             Test Item    Value        Reference Range Interpretation Comments

 

             Lymphocytes # (test code = Lymphocytes 1.8          1.0-5.5        

           



             #)                                                  



[facility] DallasFwmgxgeZIAGAVRLMH0549-06-86 10:50:01





             Test Item    Value        Reference Range Interpretation Comments

 

             Monocytes # (test code 0.9          See_Comment                [Aut

omated message] The



             = Monocytes #)                                        system which 

generated



                                                                 this result tra

nsmitted



                                                                 reference range

: <=0.8.



                                                                 The reference r

zhen was



                                                                 not used to int

erpret



                                                                 this result as



                                                                 normal/abnormal

.



[facility] DallasWlqeiggQVRHKRTAMD2906-88-13 10:50:01





             Test Item    Value        Reference Range Interpretation Comments

 

             Neutrophils # (test code = Neutrophils 6.3          1.5-8.1        

           



             #)                                                  



[facility] DallasVvkbxxjHOGAWZUGTL6193-35-50 10:50:01





             Test Item    Value        Reference Range Interpretation Comments

 

             Eosinophils (test code = 2.0          See_Comment                [A

utomated message] The



             Eosinophils)                                        system which ge

nerated



                                                                 this result tra

nsmitted



                                                                 reference range

: <=4.0.



                                                                 The reference r

zhen was



                                                                 not used to int

erpret



                                                                 this result as



                                                                 normal/abnormal

.



[facility] DallasPpdbafpCKUZQFTKXY7382-39-48 10:50:01





             Test Item    Value        Reference Range Interpretation Comments

 

             Segs (test code = Segs) 67.4         45.0-75.0                 



[facility] DallasZxawaykUWQEVGUQPY9052-87-83 10:50:01





             Test Item    Value        Reference Range Interpretation Comments

 

             Lymphocytes (test code = Lymphocytes) 19.9         20.0-40.0       

          



[facility] DallasMbthjleLWMYCSTOOO3401-44-38 10:50:01





             Test Item    Value        Reference Range Interpretation Comments

 

             Basophils (test code = 1.0          See_Comment                [Aut

omated message] The



             Basophils)                                          system which ge

nerated



                                                                 this result tra

nsmitted



                                                                 reference range

: <=1.0.



                                                                 The reference r

zhen was



                                                                 not used to int

erpret



                                                                 this result as



                                                                 normal/abnormal

.



[facility] DallasZlmepxaPYOKVACDLZ3482-38-06 10:50:01





             Test Item    Value        Reference Range Interpretation Comments

 

             Monocytes (test code = Monocytes) 9.7          2.0-12.0            

      



[facility]2018-05-08 05:42:00





             Test Item    Value        Reference Range Interpretation Comments

 

             B/C Ratio (test code = B/C Ratio) 17 1         6-25                

      



[facility]2018-05-08 05:42:00





             Test Item    Value        Reference Range Interpretation Comments

 

             Globulin (test code = Globulin) 4.3          2.7-4.2               

    



[facility]2018-05-08 05:42:00





             Test Item    Value        Reference Range Interpretation Comments

 

             A/G Ratio (test code = A/G Ratio) 0.7 1        0.7-1.6             

      



[facility]2018-05-08 05:42:00





             Test Item    Value        Reference Range Interpretation Comments

 

             AGAP (test code = AGAP) 14.4         10.0-20.0                 



[facility]2018-05-08 05:42:00





             Test Item    Value        Reference Range Interpretation Comments

 

             eGFR (test code = eGFR) 113                                    



[facility]2018-05-08 05:42:00





             Test Item    Value        Reference Range Interpretation Comments

 

             Alk Phos (test code = Alk Phos) 76                           

    



[facility]2018-05-08 05:42:00





             Test Item    Value        Reference Range Interpretation Comments

 

             ALT (test code = ALT) 35           See_Comment                [Auto

mated message] The



                                                                 system which ge

nerated this



                                                                 result transmit

huma



                                                                 reference range

: <=65. The



                                                                 reference range

 was not



                                                                 used to interpr

et this



                                                                 result as zayda

l/abnormal.



[facility]2018-05-08 05:42:00





             Test Item    Value        Reference Range Interpretation Comments

 

             Albumin Lvl (test code = Albumin Lvl) 2.8          3.5-5.0         

          



[facility]2018-05-08 05:42:00





             Test Item    Value        Reference Range Interpretation Comments

 

             Total Protein (test code = Total 7.1          6.4-8.4              

     



             Protein)                                            



[facility]2018-05-08 05:42:00





             Test Item    Value        Reference Range Interpretation Comments

 

             Calcium Lvl (test code = Calcium Lvl) 8.7          8.5-10.5        

          



[facility]2018-05-08 05:42:00





             Test Item    Value        Reference Range Interpretation Comments

 

             AST (test code = AST) 18           See_Comment                [Auto

mated message] The



                                                                 system which ge

nerated this



                                                                 result transmit

huma



                                                                 reference range

: <=37. The



                                                                 reference range

 was not



                                                                 used to interpr

et this



                                                                 result as zayda

l/abnormal.



[facility]2018-05-08 05:42:00





             Test Item    Value        Reference Range Interpretation Comments

 

             Bili Total (test code = Bili Total) 0.3          0.2-1.3           

        



[facility]2018-05-08 05:42:00





             Test Item    Value        Reference Range Interpretation Comments

 

             Potassium Lvl (test code = Potassium 4.4          3.5-5.1          

         



             Lvl)                                                



[facility]2018-05-08 05:42:00





             Test Item    Value        Reference Range Interpretation Comments

 

             Chloride Lvl (test code = Chloride Lvl) 109                  

            



[place]1878-05-08 05:42:00





             Test Item    Value        Reference Range Interpretation Comments

 

             CO2 (test code = CO2) 23           24-32                     



[facility]2018-05-08 05:42:00





             Test Item    Value        Reference Range Interpretation Comments

 

             Glucose Lvl (test code = Glucose Lvl) 114          70-99           

          



[facility]2018-05-08 05:42:00





             Test Item    Value        Reference Range Interpretation Comments

 

             Creatinine Lvl (test code = Creatinine 1.01         0.50-1.40      

           



             Lvl)                                                



[facility]2018-05-08 05:42:00





             Test Item    Value        Reference Range Interpretation Comments

 

             BUN (test code = BUN) 17           7-22                      



[facility]2018-05-08 05:42:00





             Test Item    Value        Reference Range Interpretation Comments

 

             Sodium Lvl (test code = Sodium Lvl) 142          135-145           

        



[facility] DallasYtohbhoWEWDZQMEAY3279-31-16 05:42:00





             Test Item    Value        Reference Range Interpretation Comments

 

             Basophils (test code = 0.6          See_Comment                [Aut

omated message] The



             Basophils)                                          system which ge

nerated



                                                                 this result tra

nsmitted



                                                                 reference range

: <=1.0.



                                                                 The reference r

zhen was



                                                                 not used to int

erpret



                                                                 this result as



                                                                 normal/abnormal

.



[facility] DallasQmpffgnOJBFCLFGGG1140-13-72 05:42:00





             Test Item    Value        Reference Range Interpretation Comments

 

             Segs-Bands # (test code = Segs-Bands #) 4.8          1.5-8.1       

            



[facility] DallasRsiqcpqWKXAQGXCFV5938-15-73 05:42:00





             Test Item    Value        Reference Range Interpretation Comments

 

             Monocytes # (test code 0.7          See_Comment                [Aut

omated message] The



             = Monocytes #)                                        system which 

generated



                                                                 this result tra

nsmitted



                                                                 reference range

: <=0.8.



                                                                 The reference r

zhen was



                                                                 not used to int

erpret



                                                                 this result as



                                                                 normal/abnormal

.



[facility] DallasNilnukpTBGTKEPCOC7973-91-79 05:42:00





             Test Item    Value        Reference Range Interpretation Comments

 

             Lymphocytes # (test code = Lymphocytes 1.9          1.0-5.5        

           



             #)                                                  



[facility] DallasMgngoqzAPPNDHQIZD5608-32-64 05:42:00





             Test Item    Value        Reference Range Interpretation Comments

 

             Monocytes (test code = Monocytes) 9.4          2.0-12.0            

      



[facility] DallasHbffahgMNTJTCCVAY8099-74-05 05:42:00





             Test Item    Value        Reference Range Interpretation Comments

 

             Eosinophils # (test code 0.2          See_Comment                [A

utomated message] The



             = Eosinophils #)                                        system whic

h generated



                                                                 this result tra

nsmitted



                                                                 reference range

: <=0.5.



                                                                 The reference r

zhen was



                                                                 not used to int

erpret



                                                                 this result as



                                                                 normal/abnormal

.



[facility] DallasYipbwrqWDTYRWTKCP7049-15-00 05:42:00





             Test Item    Value        Reference Range Interpretation Comments

 

             Eosinophils (test code = 2.9          See_Comment                [A

utomated message] The



             Eosinophils)                                        system which ge

nerated



                                                                 this result tra

nsmitted



                                                                 reference range

: <=4.0.



                                                                 The reference r

zhen was



                                                                 not used to int

erpret



                                                                 this result as



                                                                 normal/abnormal

.



[facility] DallasPlfzjwuIZUWXNQEYU9597-55-75 05:42:00





             Test Item    Value        Reference Range Interpretation Comments

 

             Segs (test code = Segs) 62.2         45.0-75.0                 



[facility] DallasFyafdynAHCIDGWWGC4795-52-86 05:42:00





             Test Item    Value        Reference Range Interpretation Comments

 

             Lymphocytes (test code = Lymphocytes) 24.9         20.0-40.0       

          



[facility] DallasCrbnpwrEZVLBDCCYJ5190-31-15 05:42:00





             Test Item    Value        Reference Range Interpretation Comments

 

             MCH (test code = MCH) 27.5 pg      27.0-31.0                 



[id number] 05[facility]00





             Test Item    Value        Reference Range Interpretation Comments

 

             MCV (test code = MCV) 85.3         80.0-94.0                 



[facility] DallasGyscnbaGMSXDYALLN3174-30-90 05:42:00





             Test Item    Value        Reference Range Interpretation Comments

 

             Hct (test code = Hct) 43.9         42.0-54.0                 



[facility] DallasFminqhdXQHJRRPMLB8169-73-54 05:42:00





             Test Item    Value        Reference Range Interpretation Comments

 

             Hgb (test code = Hgb) 14.2         14.0-18.0                 



[facility] DallasQfazncuJWYJLHSKLL8259-11-56 05:42:00





             Test Item    Value        Reference Range Interpretation Comments

 

             WBC (test code = WBC) 7.7          3.7-10.4                  



[facility] DallasWplxujqCJWDBZKDXD9312-36-90 05:42:00





             Test Item    Value        Reference Range Interpretation Comments

 

             RBC (test code = RBC) 5.15         4.70-6.10                 



[facility] DallasJrhmogpCEKEADDLLS3782-68-25 05:42:00





             Test Item    Value        Reference Range Interpretation Comments

 

             MPV (test code = MPV) 8.4          7.4-10.4                  



[facility] DallasBzlvqbsGEGNCENMFX7162-76-99 05:42:00





             Test Item    Value        Reference Range Interpretation Comments

 

             MCHC (test code = MCHC) 32.3         32.0-36.0                 



[facility] DallasCecfxneKBCYIHSKDM5459-68-27 05:42:00





             Test Item    Value        Reference Range Interpretation Comments

 

             RDW (test code = RDW) 17.3         11.5-14.5                 



[facility] DallasSjesrbiCRMQMTXXUY5004-48-86 05:42:00





             Test Item    Value        Reference Range Interpretation Comments

 

             Platelet (test code = Platelet) 317          133-450               

    



[facility]2018-05-08 05:42:00





             Test Item    Value        Reference Range Interpretation Comments

 

             B/C Ratio (test code = B/C Ratio) 17 1         6-25                

      



[facility]2018-05-08 05:42:00





             Test Item    Value        Reference Range Interpretation Comments

 

             Globulin (test code = Globulin) 4.3          2.7-4.2               

    



[facility]2018-05-08 05:42:00





             Test Item    Value        Reference Range Interpretation Comments

 

             A/G Ratio (test code = A/G Ratio) 0.7 1        0.7-1.6             

      



[facility]2018-05-08 05:42:00





             Test Item    Value        Reference Range Interpretation Comments

 

             AGAP (test code = AGAP) 14.4         10.0-20.0                 



[facility]2018-05-08 05:42:00





             Test Item    Value        Reference Range Interpretation Comments

 

             eGFR (test code = eGFR) 113                                    



[facility]2018-05-08 05:42:00





             Test Item    Value        Reference Range Interpretation Comments

 

             Alk Phos (test code = Alk Phos) 76                           

    



[facility]2018-05-08 05:42:00





             Test Item    Value        Reference Range Interpretation Comments

 

             ALT (test code = ALT) 35           See_Comment                [Auto

mated message] The



                                                                 system which ge

nerated this



                                                                 result transmit

huma



                                                                 reference range

: <=65. The



                                                                 reference range

 was not



                                                                 used to interpr

et this



                                                                 result as zayda

l/abnormal.



[facility]2018-05-08 05:42:00





             Test Item    Value        Reference Range Interpretation Comments

 

             Albumin Lvl (test code = Albumin Lvl) 2.8          3.5-5.0         

          



[facility]2018-05-08 05:42:00





             Test Item    Value        Reference Range Interpretation Comments

 

             Total Protein (test code = Total 7.1          6.4-8.4              

     



             Protein)                                            



[facility]2018-05-08 05:42:00





             Test Item    Value        Reference Range Interpretation Comments

 

             Calcium Lvl (test code = Calcium Lvl) 8.7          8.5-10.5        

          



[facility]2018-05-08 05:42:00





             Test Item    Value        Reference Range Interpretation Comments

 

             AST (test code = AST) 18           See_Comment                [Auto

mated message] The



                                                                 system which ge

nerated this



                                                                 result transmit

huma



                                                                 reference range

: <=37. The



                                                                 reference range

 was not



                                                                 used to interpr

et this



                                                                 result as zayda

l/abnormal.



[facility]2018-05-08 05:42:00





             Test Item    Value        Reference Range Interpretation Comments

 

             Bili Total (test code = Bili Total) 0.3          0.2-1.3           

        



[facility]2018-05-08 05:42:00





             Test Item    Value        Reference Range Interpretation Comments

 

             Potassium Lvl (test code = Potassium 4.4          3.5-5.1          

         



             Lvl)                                                



[facility]2018-05-08 05:42:00





             Test Item    Value        Reference Range Interpretation Comments

 

             Chloride Lvl (test code = Chloride Lvl) 109                  

            



[facility]2018-05-08 05:42:00





             Test Item    Value        Reference Range Interpretation Comments

 

             CO2 (test code = CO2) 23           24-32                     



[facility]2018-05-08 05:42:00





             Test Item    Value        Reference Range Interpretation Comments

 

             Glucose Lvl (test code = Glucose Lvl) 114          70-99           

          



[facility]2018-05-08 05:42:00





             Test Item    Value        Reference Range Interpretation Comments

 

             Creatinine Lvl (test code = Creatinine 1.01         0.50-1.40      

           



             Lvl)                                                



[facility]2018-05-08 05:42:00





             Test Item    Value        Reference Range Interpretation Comments

 

             BUN (test code = BUN) 17           7-22                      



[facility]2018-05-08 05:42:00





             Test Item    Value        Reference Range Interpretation Comments

 

             Sodium Lvl (test code = Sodium Lvl) 142          135-145           

        



[facility] DallasRvfzfgiAGCYFATSWM0002-96-06 05:42:00





             Test Item    Value        Reference Range Interpretation Comments

 

             Basophils (test code = 0.6          See_Comment                [Aut

omated message] The



             Basophils)                                          system which ge

nerated



                                                                 this result tra

nsmitted



                                                                 reference range

: <=1.0.



                                                                 The reference r

zhen was



                                                                 not used to int

erpret



                                                                 this result as



                                                                 normal/abnormal

.



[facility] DallasSfnvgsiYLOVCFDIZB3096-74-62 05:42:00





             Test Item    Value        Reference Range Interpretation Comments

 

             Segs-Bands # (test code = Segs-Bands #) 4.8          1.5-8.1       

            



[facility] DallasLenvijjMCCAUXXYVC3645-23-12 05:42:00





             Test Item    Value        Reference Range Interpretation Comments

 

             Monocytes # (test code 0.7          See_Comment                [Aut

omated message] The



             = Monocytes #)                                        system which 

generated



                                                                 this result tra

nsmitted



                                                                 reference range

: <=0.8.



                                                                 The reference r

zhen was



                                                                 not used to int

erpret



                                                                 this result as



                                                                 normal/abnormal

.



[facility] DallasZhfvmlgLWSJJOPAZS6866-57-27 05:42:00





             Test Item    Value        Reference Range Interpretation Comments

 

             Lymphocytes # (test code = Lymphocytes 1.9          1.0-5.5        

           



             #)                                                  



[facility] DallasRkwdbduBCYENKEGGL2477-89-29 05:42:00





             Test Item    Value        Reference Range Interpretation Comments

 

             Monocytes (test code = Monocytes) 9.4          2.0-12.0            

      



[facility] DallasEgwtapsSSRIGMSRVX3076-56-75 05:42:00





             Test Item    Value        Reference Range Interpretation Comments

 

             Eosinophils # (test code 0.2          See_Comment                [A

utomated message] The



             = Eosinophils #)                                        system whic

h generated



                                                                 this result tra

nsmitted



                                                                 reference range

: <=0.5.



                                                                 The reference r

zhen was



                                                                 not used to int

erpret



                                                                 this result as



                                                                 normal/abnormal

.



[facility] DallasFnptsluUEPNVNIXXX7568-03-85 05:42:00





             Test Item    Value        Reference Range Interpretation Comments

 

             Eosinophils (test code = 2.9          See_Comment                [A

utomated message] The



             Eosinophils)                                        system which ge

nerated



                                                                 this result tra

nsmitted



                                                                 reference range

: <=4.0.



                                                                 The reference r

zhen was



                                                                 not used to int

erpret



                                                                 this result as



                                                                 normal/abnormal

.



[facility] DallasJhkcmsmVKDEGBWHSP6380-33-05 05:42:00





             Test Item    Value        Reference Range Interpretation Comments

 

             Segs (test code = Segs) 62.2         45.0-75.0                 



[facility] DallasRnkclqlZTSNDCMZAC6112-16-70 05:42:00





             Test Item    Value        Reference Range Interpretation Comments

 

             Lymphocytes (test code = Lymphocytes) 24.9         20.0-40.0       

          



[facility] DallasRrrtjhhGTUVPHIISQ0091-06-22 05:42:00





             Test Item    Value        Reference Range Interpretation Comments

 

             MCH (test code = MCH) 27.5 pg      27.0-31.0                 



[facility] DallasZfhhktxFUGQHHACSA2292-52-74 05:42:00





             Test Item    Value        Reference Range Interpretation Comments

 

             MCV (test code = MCV) 85.3         80.0-94.0                 



[facility] DallasVqczchhKSVLNGKTVG2352-95-03 05:42:00





             Test Item    Value        Reference Range Interpretation Comments

 

             Hct (test code = Hct) 43.9         42.0-54.0                 



[facility] DallasGwfqhliJWNYHJGZZB3486-49-79 05:42:00





             Test Item    Value        Reference Range Interpretation Comments

 

             Hgb (test code = Hgb) 14.2         14.0-18.0                 



[facility] DallasVaeqeqaGKSDBPQWWK4180-15-28 05:42:00





             Test Item    Value        Reference Range Interpretation Comments

 

             WBC (test code = WBC) 7.7          3.7-10.4                  



[facility] DallasMwgarajNOYEYJWZSO7028-06-62 05:42:00





             Test Item    Value        Reference Range Interpretation Comments

 

             RBC (test code = RBC) 5.15         4.70-6.10                 



[facility] DallasRxwldozYUORDKFCRZ7987-09-06 05:42:00





             Test Item    Value        Reference Range Interpretation Comments

 

             MPV (test code = MPV) 8.4          7.4-10.4                  



[facility] DallasYuwbsuaCJTAUTAMQG7518-19-76 05:42:00





             Test Item    Value        Reference Range Interpretation Comments

 

             MCHC (test code = MCHC) 32.3         32.0-36.0                 



[facility] DallasPbmbmbmORZINCKAWK1804-05-50 05:42:00





             Test Item    Value        Reference Range Interpretation Comments

 

             RDW (test code = RDW) 17.3         11.5-14.5                 



[facility] DallasAupyujhGVTOKEPCIY8091-38-70 05:42:00





             Test Item    Value        Reference Range Interpretation Comments

 

             Platelet (test code = Platelet) 317          556-450               

    



[facility] YHAIW7530-81-42 05:42:00





             Test Item    Value        Reference Range Interpretation Comments

 

             B/C Ratio (test code = B/C Ratio) 17 1         6-25                

      



[facility]





             Test Item    Value        Reference Range Interpretation Comments

 

             Globulin (test code = Globulin) 4.3          2.7-4.2               

    



[facility] QKBPZ5117-81-85 05:42:00





             Test Item    Value        Reference Range Interpretation Comments

 

             A/G Ratio (test code = A/G Ratio) 0.7 1        0.7-1.6             

      



[facility]2018-05-08 05:42:00





             Test Item    Value        Reference Range Interpretation Comments

 

             AGAP (test code = AGAP) 14.4         10.0-20.0                 



[facility]2018-05-08 05:42:00





             Test Item    Value        Reference Range Interpretation Comments

 

             eGFR (test code = eGFR) 113                                    



[facility]2018-05-08 05:42:00





             Test Item    Value        Reference Range Interpretation Comments

 

             Alk Phos (test code = Alk Phos) 76                           

    



[facility]2018-05-08 05:42:00





             Test Item    Value        Reference Range Interpretation Comments

 

             ALT (test code = ALT) 35           See_Comment                [Auto

mated message] The



                                                                 system which ge

nerated this



                                                                 result transmit

huma



                                                                 reference range

: <=65. The



                                                                 reference range

 was not



                                                                 used to interpr

et this



                                                                 result as zayda

l/abnormal.



[facility]2018-05-08 05:42:00





             Test Item    Value        Reference Range Interpretation Comments

 

             Albumin Lvl (test code = Albumin Lvl) 2.8          3.5-5.0         

          



[facility]2018-05-08 05:42:00





             Test Item    Value        Reference Range Interpretation Comments

 

             Total Protein (test code = Total 7.1          6.4-8.4              

     



             Protein)                                            



[facility]2018-05-08 05:42:00





             Test Item    Value        Reference Range Interpretation Comments

 

             Calcium Lvl (test code = Calcium Lvl) 8.7          8.5-10.5        

          



[facility]2018-05-08 05:42:00





             Test Item    Value        Reference Range Interpretation Comments

 

             AST (test code = AST) 18           See_Comment                [Auto

mated message] The



                                                                 system which ge

nerated this



                                                                 result transmit

huma



                                                                 reference range

: <=37. The



                                                                 reference range

 was not



                                                                 used to interpr

et this



                                                                 result as zayda

l/abnormal.



[place] [id number] 05:42:00





             Test Item    Value        Reference Range Interpretation Comments

 

             Bili Total (test code = Bili Total) 0.3          0.2-1.3           

        



[place] [id number] 05:42:00





             Test Item    Value        Reference Range Interpretation Comments

 

             Potassium Lvl (test code = Potassium 4.4          3.5-5.1          

         



             Lvl)                                                



[facility]2018-05-08 05:42:00





             Test Item    Value        Reference Range Interpretation Comments

 

             Chloride Lvl (test code = Chloride Lvl) 109                  

            



[facility]2018-05-08 05:42:00





             Test Item    Value        Reference Range Interpretation Comments

 

             CO2 (test code = CO2) 23           24-32                     



[facility]2018-05-08 05:42:00





             Test Item    Value        Reference Range Interpretation Comments

 

             Glucose Lvl (test code = Glucose Lvl) 114          70-99           

          



[facility]2018-05-08 05:42:00





             Test Item    Value        Reference Range Interpretation Comments

 

             Creatinine Lvl (test code = Creatinine 1.01         0.50-1.40      

           



             Lvl)                                                



[facility]2018-05-08 05:42:00





             Test Item    Value        Reference Range Interpretation Comments

 

             BUN (test code = BUN) 17           7-22                      



[facility]2018-05-08 05:42:00





             Test Item    Value        Reference Range Interpretation Comments

 

             Sodium Lvl (test code = Sodium Lvl) 142          135-145           

        



[facility] DallasNpgxwdwAUDMQCXEWA7488-13-40 05:42:00





             Test Item    Value        Reference Range Interpretation Comments

 

             Basophils (test code = 0.6          See_Comment                [Aut

omated message] The



             Basophils)                                          system which ge

nerated



                                                                 this result tra

nsmitted



                                                                 reference range

: <=1.0.



                                                                 The reference r

zhen was



                                                                 not used to int

erpret



                                                                 this result as



                                                                 normal/abnormal

.



[facility] DallasRjejtfdLKKGJXEJUQ6923-94-88 05:42:00





             Test Item    Value        Reference Range Interpretation Comments

 

             Segs-Bands # (test code = Segs-Bands #) 4.8          1.5-8.1       

            



[facility] DallasGvyasxxEJQPZOYWOH6102-38-03 05:42:00





             Test Item    Value        Reference Range Interpretation Comments

 

             Monocytes # (test code 0.7          See_Comment                [Aut

omated message] The



             = Monocytes #)                                        system which 

generated



                                                                 this result tra

nsmitted



                                                                 reference range

: <=0.8.



                                                                 The reference r

zhen was



                                                                 not used to int

erpret



                                                                 this result as



                                                                 normal/abnormal

.



[place] [id number] 05:42:00





             Test Item    Value        Reference Range Interpretation Comments

 

             Lymphocytes # (test code = Lymphocytes 1.9          1.0-5.5        

           



             #)                                                  



[facility] DallasZuvmulxCLIQTPPCDX8713-41-96 05:42:00





             Test Item    Value        Reference Range Interpretation Comments

 

             Monocytes (test code = Monocytes) 9.4          2.0-12.0            

      



[facility] DallasCismcoaIREJLMBLMK4970-75-28 05:42:00





             Test Item    Value        Reference Range Interpretation Comments

 

             Eosinophils # (test code 0.2          See_Comment                [A

utomated message] The



             = Eosinophils #)                                        system whic

h generated



                                                                 this result tra

nsmitted



                                                                 reference range

: <=0.5.



                                                                 The reference r

zhen was



                                                                 not used to int

erpret



                                                                 this result as



                                                                 normal/abnormal

.



[facility]nvwvgmIGYGXLVRAR6186[facility]00





             Test Item    Value        Reference Range Interpretation Comments

 

             Eosinophils (test code = 2.9          See_Comment                [A

utomated message] The



             Eosinophils)                                        system which ge

nerated



                                                                 this result tra

nsmitted



                                                                 reference range

: <=4.0.



                                                                 The reference r

zhen was



                                                                 not used to int

erpret



                                                                 this result as



                                                                 normal/abnormal

.



[facility] DallasBvrtpulQDMENKMXZD9788-04-23 05:42:00





             Test Item    Value        Reference Range Interpretation Comments

 

             Segs (test code = Segs) 62.2         45.0-75.0                 



[facility] DallasBaredbaGUHLCGNIBL6739-98-06 05:42:00





             Test Item    Value        Reference Range Interpretation Comments

 

             Lymphocytes (test code = Lymphocytes) 24.9         20.0-40.0       

          



[facility] DallasBuggfnkPVAXWXOEVQ4807-51-72 05:42:00





             Test Item    Value        Reference Range Interpretation Comments

 

             MCH (test code = MCH) 27.5 pg      27.0-31.0                 



[facility] DallasToyzoltFEKAXNXHXD4280-85-01 05:42:00





             Test Item    Value        Reference Range Interpretation Comments

 

             MCV (test code = MCV) 85.3         80.0-94.0                 



[facility] DallasPxtvoylQRXPNXFWFP3055-55-37 05:42:00





             Test Item    Value        Reference Range Interpretation Comments

 

             Hct (test code = Hct) 43.9         42.0-54.0                 



[facility] DallasNmjipavNSQPWPBYBE7731-89-76 05:42:00





             Test Item    Value        Reference Range Interpretation Comments

 

             Hgb (test code = Hgb) 14.2         14.0-18.0                 



[facility] DallasAlvpmkvLEKFFNZBJD0071-08-49 05:42:00





             Test Item    Value        Reference Range Interpretation Comments

 

             WBC (test code = WBC) 7.7          3.7-10.4                  



[facility] DallasUhyyrhyTQVXKYOJLQ2470-62-02 05:42:00





             Test Item    Value        Reference Range Interpretation Comments

 

             RBC (test code = RBC) 5.15         4.70-6.10                 



[facility] DallasYbeujizDBDVJTLQGG8070-58-82 05:42:00





             Test Item    Value        Reference Range Interpretation Comments

 

             MPV (test code = MPV) 8.4          7.4-10.4                  



[facility] DallasWmgwgncLRXNRONHVF2511-77-44 05:42:00





             Test Item    Value        Reference Range Interpretation Comments

 

             MCHC (test code = MCHC) 32.3         32.0-36.0                 



[facility] DallasSttjazdTPUZUTADRD2224-03-00 05:42:00





             Test Item    Value        Reference Range Interpretation Comments

 

             RDW (test code = RDW) 17.3         11.5-14.5                 



[facility] DallasTjxzlxuUPXWWEPOAQ6087-86-84 05:42:00





             Test Item    Value        Reference Range Interpretation Comments

 

             Platelet (test code = Platelet) 317          133-450               

    



[facility]2018-05-08 05:42:00





             Test Item    Value        Reference Range Interpretation Comments

 

             B/C Ratio (test code = B/C Ratio) 17 1         6-25                

      



[facility]2018-05-08 05:42:00





             Test Item    Value        Reference Range Interpretation Comments

 

             Globulin (test code = Globulin) 4.3          2.7-4.2               

    



[facility]2018-05-08 05:42:00





             Test Item    Value        Reference Range Interpretation Comments

 

             A/G Ratio (test code = A/G Ratio) 0.7 1        0.7-1.6             

      



[facility]2018-05-08 05:42:00





             Test Item    Value        Reference Range Interpretation Comments

 

             AGAP (test code = AGAP) 14.4         10.0-20.0                 



[facility]2018-05-08 05:42:00





             Test Item    Value        Reference Range Interpretation Comments

 

             eGFR (test code = eGFR) 113                                    



[facility]2018-05-08 05:42:00





             Test Item    Value        Reference Range Interpretation Comments

 

             Alk Phos (test code = Alk Phos) 76                           

    



[facility]2018-05-08 05:42:00





             Test Item    Value        Reference Range Interpretation Comments

 

             ALT (test code = ALT) 35           See_Comment                [Auto

mated message] The



                                                                 system which ge

nerated this



                                                                 result transmit

huma



                                                                 reference range

: <=65. The



                                                                 reference range

 was not



                                                                 used to interpr

et this



                                                                 result as zayda

l/abnormal.



[facility]2018-05-08 05:42:00





             Test Item    Value        Reference Range Interpretation Comments

 

             Albumin Lvl (test code = Albumin Lvl) 2.8          3.5-5.0         

          



[facility]2018-05-08 05:42:00





             Test Item    Value        Reference Range Interpretation Comments

 

             Total Protein (test code = Total 7.1          6.4-8.4              

     



             Protein)                                            



[facility]2018-05-08 05:42:00





             Test Item    Value        Reference Range Interpretation Comments

 

             Calcium Lvl (test code = Calcium Lvl) 8.7          8.5-10.5        

          



[place]1878-05-08 05:42:00





             Test Item    Value        Reference Range Interpretation Comments

 

             AST (test code = AST) 18           See_Comment                [Auto

mated message] The



                                                                 system which ge

nerated this



                                                                 result transmit

huma



                                                                 reference range

: <=37. The



                                                                 reference range

 was not



                                                                 used to interpr

et this



                                                                 result as zayda

l/abnormal.



[facility]2018-05-08 05:42:00





             Test Item    Value        Reference Range Interpretation Comments

 

             Bili Total (test code = Bili Total) 0.3          0.2-1.3           

        



[place] 691878-05-08 05:42:00





             Test Item    Value        Reference Range Interpretation Comments

 

             Potassium Lvl (test code = Potassium 4.4          3.5-5.1          

         



             Lvl)                                                



[facility]2018-05-08 05:42:00





             Test Item    Value        Reference Range Interpretation Comments

 

             Chloride Lvl (test code = Chloride Lvl) 109                  

            



[facility]2018-05-08 05:42:00





             Test Item    Value        Reference Range Interpretation Comments

 

             CO2 (test code = CO2) 23           24-32                     



[place] 691878-05-08 05:42:00





             Test Item    Value        Reference Range Interpretation Comments

 

             Glucose Lvl (test code = Glucose Lvl) 114          70-99           

          



[place] 691878-05-08 05:42:00





             Test Item    Value        Reference Range Interpretation Comments

 

             Creatinine Lvl (test code = Creatinine 1.01         0.50-1.40      

           



             Lvl)                                                



[facility]2018-05-08 05:42:00





             Test Item    Value        Reference Range Interpretation Comments

 

             BUN (test code = BUN) 17           7-22                      



[facility]2018-05-08 05:42:00





             Test Item    Value        Reference Range Interpretation Comments

 

             Sodium Lvl (test code = Sodium Lvl) 142          135-145           

        



[facility] DallasUiaftcuJUYTCXLJWZ9540-53-98 05:42:00





             Test Item    Value        Reference Range Interpretation Comments

 

             Basophils (test code = 0.6          See_Comment                [Aut

omated message] The



             Basophils)                                          system which ge

nerated



                                                                 this result tra

nsmitted



                                                                 reference range

: <=1.0.



                                                                 The reference r

zhen was



                                                                 not used to int

erpret



                                                                 this result as



                                                                 normal/abnormal

.



[facility] DallasXzzawpmUYWRDAVTQG4654-60-96 05:42:00





             Test Item    Value        Reference Range Interpretation Comments

 

             Segs-Bands # (test code = Segs-Bands #) 4.8          1.5-8.1       

            



[facility] DallasImjtowpVUVYNCUPZD3014-13-38 05:42:00





             Test Item    Value        Reference Range Interpretation Comments

 

             Monocytes # (test code 0.7          See_Comment                [Aut

omated message] The



             = Monocytes #)                                        system which 

generated



                                                                 this result tra

nsmitted



                                                                 reference range

: <=0.8.



                                                                 The reference r

zhen was



                                                                 not used to int

erpret



                                                                 this result as



                                                                 normal/abnormal

.



[facility] DallasRnosduzOPIQNLZWIQ7633-34-32 05:42:00





             Test Item    Value        Reference Range Interpretation Comments

 

             Lymphocytes # (test code = Lymphocytes 1.9          1.0-5.5        

           



             #)                                                  



[facility] DallasKdpgazdDZXYFDRBNA3536-27-12 05:42:00





             Test Item    Value        Reference Range Interpretation Comments

 

             Monocytes (test code = Monocytes) 9.4          2.0-12.0            

      



[facility] DallasJgkrecxVKZAZQMPKP7878-73-03 05:42:00





             Test Item    Value        Reference Range Interpretation Comments

 

             Eosinophils # (test code 0.2          See_Comment                [A

utomated message] The



             = Eosinophils #)                                        system wh

h generated



                                                                 this result tra

nsmitted



                                                                 reference range

: <=0.5.



                                                                 The reference r

zhen was



                                                                 not used to int

erpret



                                                                 this result as



                                                                 normal/abnormal

.



[facility] DallasOfvjmlkWGYDSSAOJC1024-05-31 05:42:00





             Test Item    Value        Reference Range Interpretation Comments

 

             Eosinophils (test code = 2.9          See_Comment                [A

utomated message] The



             Eosinophils)                                        system which ge

nerated



                                                                 this result tra

nsmitted



                                                                 reference range

: <=4.0.



                                                                 The reference r

zhen was



                                                                 not used to int

erpret



                                                                 this result as



                                                                 normal/abnormal

.



[facility] DallasSkhplqwNCJANYJYWR0980-99-94 05:42:00





             Test Item    Value        Reference Range Interpretation Comments

 

             Segs (test code = Segs) 62.2         45.0-75.0                 



[facility]





             Test Item    Value        Reference Range Interpretation Comments

 

             Lymphocytes (test code = Lymphocytes) 24.9         20.0-40.0       

          



[facility]DbpmcqoLJEOTDVWOL1633-70-70 05:42:00





             Test Item    Value        Reference Range Interpretation Comments

 

             MCH (test code = MCH) 27.5 pg      27.0-31.0                 



[facility] DallasYleiujtBQLEHUJYPZ9535-79-98 05:42:00





             Test Item    Value        Reference Range Interpretation Comments

 

             MCV (test code = MCV) 85.3         80.0-94.0                 



[facility]xvowgbGYXXDPBGUT6455-53-98 05:42:00





             Test Item    Value        Reference Range Interpretation Comments

 

             Hct (test code = Hct) 43.9         42.0-54.0                 



[facility] DallasKfqalzrMOAFRTYKMP6125-30-42 05:42:00





             Test Item    Value        Reference Range Interpretation Comments

 

             Hgb (test code = Hgb) 14.2         14.0-18.0                 



[facility] DallasUpzkuxdLDCNQZUUGW4928-21-14 05:42:00





             Test Item    Value        Reference Range Interpretation Comments

 

             WBC (test code = WBC) 7.7          3.7-10.4                  



[facility]EehzempAESFRLULIL1473-94-86 05[facility]42:[facility]





             Test Item    Value        Reference Range Interpretation Comments

 

             RBC (test code = RBC) 5.15         4.70-6.10                 



[facility] HospitalNerhvefATGRORDQRT1473-53-08 05:42:00





             Test Item    Value        Reference Range Interpretation Comments

 

             MPV (test code = MPV) 8.4          7.4-10.4                  



[facility] DallasRzypxkzRCBCDPGEGV4301-02-06 05:42:00





             Test Item    Value        Reference Range Interpretation Comments

 

             MCHC (test code = MCHC) 32.3         32.0-36.0                 



[facility] DallasDzvabtbBHQYULIZGW7422-47-77 05:42:00





             Test Item    Value        Reference Range Interpretation Comments

 

             RDW (test code = RDW) 17.3         11.5-14.5                 



[facility] HospitalFqzusoiOBCOPRALNG8166-95-16 05:42:00





             Test Item    Value        Reference Range Interpretation Comments

 

             Platelet (test code = Platelet) 317          632-450               

    



[facility] HFEQV0446-69-35 05:42:00





             Test Item    Value        Reference Range Interpretation Comments

 

             B/C Ratio (test code = B/C Ratio) 17 1         6-25                

      



[facility]2018-05-08 05:42:00





             Test Item    Value        Reference Range Interpretation Comments

 

             Globulin (test code = Globulin) 4.3          2.7-4.2               

    



[facility]2018-05-08 05:42:00





             Test Item    Value        Reference Range Interpretation Comments

 

             A/G Ratio (test code = A/G Ratio) 0.7 1        0.7-1.6             

      



[facility]2018-05-08 05:42:00





             Test Item    Value        Reference Range Interpretation Comments

 

             AGAP (test code = AGAP) 14.4         10.0-20.0                 



[place] 691878-05-08 05:42:00





             Test Item    Value        Reference Range Interpretation Comments

 

             eGFR (test code = eGFR) 113                                    



[facility]2018-05-08 05:42:00





             Test Item    Value        Reference Range Interpretation Comments

 

             Alk Phos (test code = Alk Phos) 76                           

    



[facility]2018-05-08 05:42:00





             Test Item    Value        Reference Range Interpretation Comments

 

             ALT (test code = ALT) 35           See_Comment                [Auto

mated message] The



                                                                 system which ge

nerated this



                                                                 result transmit

huma



                                                                 reference range

: <=65. The



                                                                 reference range

 was not



                                                                 used to interpr

et this



                                                                 result as zayda

l/abnormal.



[facility]2018-05-08 05:42:00





             Test Item    Value        Reference Range Interpretation Comments

 

             Albumin Lvl (test code = Albumin Lvl) 2.8          3.5-5.0         

          



[facility]2018-05-08 05:42:00





             Test Item    Value        Reference Range Interpretation Comments

 

             Total Protein (test code = Total 7.1          6.4-8.4              

     



             Protein)                                            



[facility]2018-05-08 05:42:00





             Test Item    Value        Reference Range Interpretation Comments

 

             Calcium Lvl (test code = Calcium Lvl) 8.7          8.5-10.5        

          



[facility]2018-05-08 05:42:00





             Test Item    Value        Reference Range Interpretation Comments

 

             AST (test code = AST) 18           See_Comment                [Auto

mated message] The



                                                                 system which ge

nerated this



                                                                 result transmit

huma



                                                                 reference range

: <=37. The



                                                                 reference range

 was not



                                                                 used to interpr

et this



                                                                 result as zayda

l/abnormal.



[facility]2018-05-08 05:42:00





             Test Item    Value        Reference Range Interpretation Comments

 

             Bili Total (test code = Bili Total) 0.3          0.2-1.3           

        



[facility]2018-05-08 05:42:00





             Test Item    Value        Reference Range Interpretation Comments

 

             Potassium Lvl (test code = Potassium 4.4          3.5-5.1          

         



             Lvl)                                                



[facility]2018-05-08 05:42:00





             Test Item    Value        Reference Range Interpretation Comments

 

             Chloride Lvl (test code = Chloride Lvl) 109                  

            



[facility]2018-05-08 05:42:00





             Test Item    Value        Reference Range Interpretation Comments

 

             CO2 (test code = CO2) 23           24-32                     



[place] 691878-05-08 05:42:00





             Test Item    Value        Reference Range Interpretation Comments

 

             Glucose Lvl (test code = Glucose Lvl) 114          70-99           

          



[facility]2018-05-08 05:42:00





             Test Item    Value        Reference Range Interpretation Comments

 

             Creatinine Lvl (test code = Creatinine 1.01         0.50-1.40      

           



             Lvl)                                                



[facility]2018-05-08 05:42:00





             Test Item    Value        Reference Range Interpretation Comments

 

             BUN (test code = BUN) 17           7-22                      



[facility]2018-05-08 05:42:00





             Test Item    Value        Reference Range Interpretation Comments

 

             Sodium Lvl (test code = Sodium Lvl) 142          135-145           

        



[facility] DallasXkczkrvKACOPMVNXS1085-56-30 05:42:00





             Test Item    Value        Reference Range Interpretation Comments

 

             Basophils (test code = 0.6          See_Comment                [Aut

omated message] The



             Basophils)                                          system which ge

nerated



                                                                 this result tra

nsmitted



                                                                 reference range

: <=1.0.



                                                                 The reference r

zhen was



                                                                 not used to int

erpret



                                                                 this result as



                                                                 normal/abnormal

.



[facility] DallasHyjgehgUGFOCQBJIF8888-38-58 05:42:00





             Test Item    Value        Reference Range Interpretation Comments

 

             Segs-Bands # (test code = Segs-Bands #) 4.8          1.5-8.1       

            



[facility] DallasFfwfyvaFIKVMVDXWN7250-88-06 05:42:00





             Test Item    Value        Reference Range Interpretation Comments

 

             Monocytes # (test code 0.7          See_Comment                [Aut

omated message] The



             = Monocytes #)                                        system which 

generated



                                                                 this result tra

nsmitted



                                                                 reference range

: <=0.8.



                                                                 The reference r

zhen was



                                                                 not used to int

erpret



                                                                 this result as



                                                                 normal/abnormal

.



[place] [id number] 05:42:00





             Test Item    Value        Reference Range Interpretation Comments

 

             Lymphocytes # (test code = Lymphocytes 1.9          1.0-5.5        

           



             #)                                                  



[facility]AV[facility]4120[facility]71-95 05[facility]42:00





             Test Item    Value        Reference Range Interpretation Comments

 

             Monocytes (test code = Monocytes) 9.4          2.0-12.0            

      



[facility] DallasDndudjtWXKHWQZEUN2448-86-39 05:42:00





             Test Item    Value        Reference Range Interpretation Comments

 

             Eosinophils # (test code 0.2          See_Comment                [A

utomated message] The



             = Eosinophils #)                                        system whic

h generated



                                                                 this result tra

nsmitted



                                                                 reference range

: <=0.5.



                                                                 The reference r

zhen was



                                                                 not used to int

erpret



                                                                 this result as



                                                                 normal/abnormal

.



[facility] DallasPzotdufSSBEPENHYT0188-49-45 05:42:00





             Test Item    Value        Reference Range Interpretation Comments

 

             Eosinophils (test code = 2.9          See_Comment                [A

utomated message] The



             Eosinophils)                                        system which ge

nerated



                                                                 this result tra

nsmitted



                                                                 reference range

: <=4.0.



                                                                 The reference r

zhen was



                                                                 not used to int

erpret



                                                                 this result as



                                                                 normal/abnormal

.



[facility] DallasEawruhoZSRMWRGCIP6696-[facility]-30 05:42:00





             Test Item    Value        Reference Range Interpretation Comments

 

             Segs (test code = Segs) 62.2         45.0-75.0                 



[facility] DallasVnlnaysBSJQFDSBZM0895-40-08 05:42:00





             Test Item    Value        Reference Range Interpretation Comments

 

             Lymphocytes (test code = Lymphocytes) 24.9         20.0-40.0       

          



[facility] DallasMhkrntuSBRFBVKYQS5770-57-72 05:42:00





             Test Item    Value        Reference Range Interpretation Comments

 

             MCH (test code = MCH) 27.5 pg      27.0-31.0                 



[facility] DallasUbimtfhUYUVBSOEOF5766-87-73 05:42:00





             Test Item    Value        Reference Range Interpretation Comments

 

             MCV (test code = MCV) 85.3         80.0-94.0                 



[facility] DallasIjvkyhrBNRXQGINBT0612-50-74 05:42:00





             Test Item    Value        Reference Range Interpretation Comments

 

             Hct (test code = Hct) 43.9         42.0-54.0                 



[facility] DallasZxdqwupCJMGZBNVGT1859-18-09 05:42:00





             Test Item    Value        Reference Range Interpretation Comments

 

             Hgb (test code = Hgb) 14.2         14.0-18.0                 



[facility] DallasSstgwrmIBKWSIXKLM3297-72-74 05:42:00





             Test Item    Value        Reference Range Interpretation Comments

 

             WBC (test code = WBC) 7.7          3.7-10.4                  



[facility] DallasOmvrwehWTMGBWABPN2808-63-15 05:42:00





             Test Item    Value        Reference Range Interpretation Comments

 

             RBC (test code = RBC) 5.15         4.70-6.10                 



[facility] DallasFzgnyfuKHQTMMSNPB0730-62-10 05:42:00





             Test Item    Value        Reference Range Interpretation Comments

 

             MPV (test code = MPV) 8.4          7.4-10.4                  



[facility] DallasPtygbmlFCSLRBUKFF0354-30-83 05:42:00





             Test Item    Value        Reference Range Interpretation Comments

 

             MCHC (test code = MCHC) 32.3         32.0-36.0                 



[facility] DallasUoukqapLHAVVVKCTQ8570-02-95 05:42:00





             Test Item    Value        Reference Range Interpretation Comments

 

             RDW (test code = RDW) 17.3         11.5-14.5                 



[facility] DallasDfqiqmdSNMIWOXALP2117-44-10 05:42:00





             Test Item    Value        Reference Range Interpretation Comments

 

             Platelet (test code = Platelet) 317          133-450               

    



[facility]2018-05-08 05:42:00





             Test Item    Value        Reference Range Interpretation Comments

 

             B/C Ratio (test code = B/C Ratio) 17 1         6-25                

      



[facility]2018-05-08 05:42:00





             Test Item    Value        Reference Range Interpretation Comments

 

             Globulin (test code = Globulin) 4.3          2.7-4.2               

    



[facility]2018-05-08 05:42:00





             Test Item    Value        Reference Range Interpretation Comments

 

             A/G Ratio (test code = A/G Ratio) 0.7 1        0.7-1.6             

      



[facility]2018-05-08 05:42:00





             Test Item    Value        Reference Range Interpretation Comments

 

             AGAP (test code = AGAP) 14.4         10.0-20.0                 



[facility]2018-05-08 05:42:00





             Test Item    Value        Reference Range Interpretation Comments

 

             eGFR (test code = eGFR) 113                                    



[facility]2018-05-08 05:42:00





             Test Item    Value        Reference Range Interpretation Comments

 

             Alk Phos (test code = Alk Phos) 76                           

    



[facility]2018-05-08 05:42:00





             Test Item    Value        Reference Range Interpretation Comments

 

             ALT (test code = ALT) 35           See_Comment                [Auto

mated message] The



                                                                 system which ge

nerated this



                                                                 result transmit

huma



                                                                 reference range

: <=65. The



                                                                 reference range

 was not



                                                                 used to interpr

et this



                                                                 result as zayda

l/abnormal.



[facility]2018-05-08 05:42:00





             Test Item    Value        Reference Range Interpretation Comments

 

             Albumin Lvl (test code = Albumin Lvl) 2.8          3.5-5.0         

          



[facility]2018-05-08 05:42:00





             Test Item    Value        Reference Range Interpretation Comments

 

             Total Protein (test code = Total 7.1          6.4-8.4              

     



             Protein)                                            



[facility]2018-05-08 05:42:00





             Test Item    Value        Reference Range Interpretation Comments

 

             Calcium Lvl (test code = Calcium Lvl) 8.7          8.5-10.5        

          



[facility]2018-05-08 05:42:00





             Test Item    Value        Reference Range Interpretation Comments

 

             AST (test code = AST) 18           See_Comment                [Auto

mated message] The



                                                                 system which ge

nerated this



                                                                 result transmit

huma



                                                                 reference range

: <=37. The



                                                                 reference range

 was not



                                                                 used to interpr

et this



                                                                 result as zayda

l/abnormal.



[facility]2018-05-08 05:42:00





             Test Item    Value        Reference Range Interpretation Comments

 

             Bili Total (test code = Bili Total) 0.3          0.2-1.3           

        



[facility]2018-05-08 05:42:00





             Test Item    Value        Reference Range Interpretation Comments

 

             Potassium Lvl (test code = Potassium 4.4          3.5-5.1          

         



             Lvl)                                                



[facility]2018-05-08 05:42:00





             Test Item    Value        Reference Range Interpretation Comments

 

             Chloride Lvl (test code = Chloride Lvl) 109                  

            



[facility]2018-05-08 05:42:00





             Test Item    Value        Reference Range Interpretation Comments

 

             CO2 (test code = CO2) 23           24-32                     



[facility]2018-05-08 05:42:00





             Test Item    Value        Reference Range Interpretation Comments

 

             Glucose Lvl (test code = Glucose Lvl) 114          70-99           

          



[facility]2018-05-08 05:42:00





             Test Item    Value        Reference Range Interpretation Comments

 

             Creatinine Lvl (test code = Creatinine 1.01         0.50-1.40      

           



             Lvl)                                                



[facility]2018-05-08 05:42:00





             Test Item    Value        Reference Range Interpretation Comments

 

             BUN (test code = BUN) 17           7-22                      



[facility] IGVNW3477-37-42 05:42:00





             Test Item    Value        Reference Range Interpretation Comments

 

             Sodium Lvl (test code = Sodium Lvl) 142          135-145           

        



[facility] DallasCmgfsuqFINVQHYEXL9846-77-13 05:42:00





             Test Item    Value        Reference Range Interpretation Comments

 

             Basophils (test code = 0.6          See_Comment                [Aut

omated message] The



             Basophils)                                          system which ge

nerated



                                                                 this result tra

nsmitted



                                                                 reference range

: <=1.0.



                                                                 The reference r

zhen was



                                                                 not used to int

erpret



                                                                 this result as



                                                                 normal/abnormal

.



[facility] DallasTdkvckmKMHYAYPBMW9401-28-60 05:42:00





             Test Item    Value        Reference Range Interpretation Comments

 

             Segs-Bands # (test code = Segs-Bands #) 4.8          1.5-8.1       

            



[facility] DallasYtrbqfaRAADEBLHLJ0375-03-22 05:42:00





             Test Item    Value        Reference Range Interpretation Comments

 

             Monocytes # (test code 0.7          See_Comment                [Aut

omated message] The



             = Monocytes #)                                        system which 

generated



                                                                 this result tra

nsmitted



                                                                 reference range

: <=0.8.



                                                                 The reference r

zhen was



                                                                 not used to int

erpret



                                                                 this result as



                                                                 normal/abnormal

.



[facility] DallasCnrlftfCWUSRXQRSG5526-95-46 05:42:00





             Test Item    Value        Reference Range Interpretation Comments

 

             Lymphocytes # (test code = Lymphocytes 1.9          1.0-5.5        

           



             #)                                                  



[facility] DallasVvjdhoaWBQCDFIHUQ8436-26-62 05:42:00





             Test Item    Value        Reference Range Interpretation Comments

 

             Monocytes (test code = Monocytes) 9.4          2.0-12.0            

      



[facility] DallasRizjvbxKFTFOAKMYG8684-13-51 05:42:00





             Test Item    Value        Reference Range Interpretation Comments

 

             Eosinophils # (test code 0.2          See_Comment                [A

utomated message] The



             = Eosinophils #)                                        system whic

h generated



                                                                 this result tra

nsmitted



                                                                 reference range

: <=0.5.



                                                                 The reference r

zhen was



                                                                 not used to int

erpret



                                                                 this result as



                                                                 normal/abnormal

.



[facility] DallasWeciutaSWJQCAAVRH1316-54-69 05:42:00





             Test Item    Value        Reference Range Interpretation Comments

 

             Eosinophils (test code = 2.9          See_Comment                [A

utomated message] The



             Eosinophils)                                        system which ge

nerated



                                                                 this result tra

nsmitted



                                                                 reference range

: <=4.0.



                                                                 The reference r

zhen was



                                                                 not used to int

erpret



                                                                 this result as



                                                                 normal/abnormal

.



[facility] DallasYtbryllYYADLPQERJ1479-56-21 05:42:00





             Test Item    Value        Reference Range Interpretation Comments

 

             Segs (test code = Segs) 62.2         45.0-75.0                 



[facility] DallasAgzhwekGGOIRTOIWO6076-32-23 05:42:00





             Test Item    Value        Reference Range Interpretation Comments

 

             Lymphocytes (test code = Lymphocytes) 24.9         20.0-40.0       

          



[facility] DallasAyahoqgXOQNCAERTT9861-73-79 05:42:00





             Test Item    Value        Reference Range Interpretation Comments

 

             MCH (test code = MCH) 27.5 pg      27.0-31.0                 



[facility] DallasHyxlpwwYFLUVDNYTV3862-63-14 05:42:00





             Test Item    Value        Reference Range Interpretation Comments

 

             MCV (test code = MCV) 85.3         80.0-94.0                 



[facility] DallasFahtolsCKRLVHTQVU1148-09-76 05:42:00





             Test Item    Value        Reference Range Interpretation Comments

 

             Hct (test code = Hct) 43.9         42.0-54.0                 



[facility] DallasEupqkpuZBXOSZWBYC4137-82-79 05:42:00





             Test Item    Value        Reference Range Interpretation Comments

 

             Hgb (test code = Hgb) 14.2         14.0-18.0                 



[facility] DallasVumllbcPOUOBZFKMA0427-85-26 05:42:00





             Test Item    Value        Reference Range Interpretation Comments

 

             WBC (test code = WBC) 7.7          3.7-10.4                  



[facility] DallasVmhjmdqZYTQMXUSHK7517-76-25 05:42:00





             Test Item    Value        Reference Range Interpretation Comments

 

             RBC (test code = RBC) 5.15         4.70-6.10                 



[facility] DallasYairbwkCCDAMLZFTR0821-54-98 05:42:00





             Test Item    Value        Reference Range Interpretation Comments

 

             MPV (test code = MPV) 8.4          7.4-10.4                  



[facility] DallasOegewgtNSHOZRLBJZ7157-73-36 05:42:00





             Test Item    Value        Reference Range Interpretation Comments

 

             MCHC (test code = MCHC) 32.3         32.0-36.0                 



[facility] DallasMqrnmtcYFJLDLEUBW6847-47-71 05:42:00





             Test Item    Value        Reference Range Interpretation Comments

 

             RDW (test code = RDW) 17.3         11.5-14.5                 



[facility] DallasDblkjvhMSGGHZBDYF8517-38-28 05:42:00





             Test Item    Value        Reference Range Interpretation Comments

 

             Platelet (test code = Platelet) 317          133-450               

    



[facility]2018-05-08 05:42:00





             Test Item    Value        Reference Range Interpretation Comments

 

             B/C Ratio (test code = B/C Ratio) 17 1         6-25                

      



[facility]2018-05-08 05:42:00





             Test Item    Value        Reference Range Interpretation Comments

 

             Globulin (test code = Globulin) 4.3          2.7-4.2               

    



[facility]2018-05-08 05:42:00





             Test Item    Value        Reference Range Interpretation Comments

 

             A/G Ratio (test code = A/G Ratio) 0.7 1        0.7-1.6             

      



[facility]2018-05-08 05:42:00





             Test Item    Value        Reference Range Interpretation Comments

 

             AGAP (test code = AGAP) 14.4         10.0-20.0                 



[facility]2018-05-08 05:42:00





             Test Item    Value        Reference Range Interpretation Comments

 

             eGFR (test code = eGFR) 113                                    



[facility]2018-05-08 05:42:00





             Test Item    Value        Reference Range Interpretation Comments

 

             Alk Phos (test code = Alk Phos) 76                           

    



[facility]2018-05-08 05:42:00





             Test Item    Value        Reference Range Interpretation Comments

 

             ALT (test code = ALT) 35           See_Comment                [Auto

mated message] The



                                                                 system which ge

nerated this



                                                                 result transmit

huma



                                                                 reference range

: <=65. The



                                                                 reference range

 was not



                                                                 used to interpr

et this



                                                                 result as zayda

l/abnormal.



[facility]2018-05-08 05:42:00





             Test Item    Value        Reference Range Interpretation Comments

 

             Albumin Lvl (test code = Albumin Lvl) 2.8          3.5-5.0         

          



[facility]2018-05-08 05:42:00





             Test Item    Value        Reference Range Interpretation Comments

 

             Total Protein (test code = Total 7.1          6.4-8.4              

     



             Protein)                                            



[facility]2018-05-08 05:42:00





             Test Item    Value        Reference Range Interpretation Comments

 

             Calcium Lvl (test code = Calcium Lvl) 8.7          8.5-10.5        

          



[facility]2018-05-08 05:42:00





             Test Item    Value        Reference Range Interpretation Comments

 

             AST (test code = AST) 18           See_Comment                [Auto

mated message] The



                                                                 system which ge

nerated this



                                                                 result transmit

huma



                                                                 reference range

: <=37. The



                                                                 reference range

 was not



                                                                 used to interpr

et this



                                                                 result as zayda

l/abnormal.



[facility]2018-05-08 05:42:00





             Test Item    Value        Reference Range Interpretation Comments

 

             Bili Total (test code = Bili Total) 0.3          0.2-1.3           

        



[place] 691878-05-08 05:42:00





             Test Item    Value        Reference Range Interpretation Comments

 

             Potassium Lvl (test code = Potassium 4.4          3.5-5.1          

         



             Lvl)                                                



[facility]2018-05-08 05:42:00





             Test Item    Value        Reference Range Interpretation Comments

 

             Chloride Lvl (test code = Chloride Lvl) 109                  

            



[facility]2018-05-08 05:42:00





             Test Item    Value        Reference Range Interpretation Comments

 

             CO2 (test code = CO2) 23           24-32                     



[place] [id number] 05:42:00





             Test Item    Value        Reference Range Interpretation Comments

 

             Glucose Lvl (test code = Glucose Lvl) 114          70-99           

          



[facility]2018-05-08 05:42:00





             Test Item    Value        Reference Range Interpretation Comments

 

             Creatinine Lvl (test code = Creatinine 1.01         0.50-1.40      

           



             Lvl)                                                



[facility]2018-05-08 05:42:00





             Test Item    Value        Reference Range Interpretation Comments

 

             BUN (test code = BUN) 17           7-22                      



[facility]2018-05-08 05:42:00





             Test Item    Value        Reference Range Interpretation Comments

 

             Sodium Lvl (test code = Sodium Lvl) 142          135-145           

        



[facility] [facility]10[facility]00





             Test Item    Value        Reference Range Interpretation Comments

 

             Basophils (test code = 0.6          See_Comment                [Aut

omated message] The



             Basophils)                                          system which ge

nerated



                                                                 this result tra

nsmitted



                                                                 reference range

: <=1.0.



                                                                 The reference r

zhen was



                                                                 not used to int

erpret



                                                                 this result as



                                                                 normal/abnormal

.



[facility]ovfhbjJPAWKZQEEH2022[facility] 05[facility]42:00





             Test Item    Value        Reference Range Interpretation Comments

 

             Segs-Bands # (test code = Segs-Bands #) 4.8          1.5-8.1       

            



[place] [id number] 05:42:00





             Test Item    Value        Reference Range Interpretation Comments

 

             Monocytes # (test code 0.7          See_Comment                [Aut

omated message] The



             = Monocytes #)                                        system which 

generated



                                                                 this result tra

nsmitted



                                                                 reference range

: <=0.8.



                                                                 The reference r

zhen was



                                                                 not used to int

erpret



                                                                 this result as



                                                                 normal/abnormal

.



[facility] DallasGeekrjdURUMWKDWFZ1974-[facility] 05:42:00





             Test Item    Value        Reference Range Interpretation Comments

 

             Lymphocytes # (test code = Lymphocytes 1.9          1.0-5.5        

           



             #)                                                  



[facility] DallasWoconreJFPQCHISWP4093-39-59 05:42:00





             Test Item    Value        Reference Range Interpretation Comments

 

             Monocytes (test code = Monocytes) 9.4          2.0-12.0            

      



[facility] DallasVuiinuyXKMJPPLHVN0168-38-22 05:42:00





             Test Item    Value        Reference Range Interpretation Comments

 

             Eosinophils # (test code 0.2          See_Comment                [A

utomated message] The



             = Eosinophils #)                                        system whic

h generated



                                                                 this result tra

nsmitted



                                                                 reference range

: <=0.5.



                                                                 The reference r

zhen was



                                                                 not used to int

erpret



                                                                 this result as



                                                                 normal/abnormal

.



[facility] DallasKhyaepjJBKBENYHNR1177-88-45 05:42:00





             Test Item    Value        Reference Range Interpretation Comments

 

             Eosinophils (test code = 2.9          See_Comment                [A

utomated message] The



             Eosinophils)                                        system which ge

nerated



                                                                 this result tra

nsmitted



                                                                 reference range

: <=4.0.



                                                                 The reference r

zhen was



                                                                 not used to int

erpret



                                                                 this result as



                                                                 normal/abnormal

.



[facility] DallasAqsypclEDTYMBMAWS4596-06-87 05:42:00





             Test Item    Value        Reference Range Interpretation Comments

 

             Segs (test code = Segs) 62.2         45.0-75.0                 



[facility] DallasZydsrynSDWRTZKMIM7067-45-06 05:42:00





             Test Item    Value        Reference Range Interpretation Comments

 

             Lymphocytes (test code = Lymphocytes) 24.9         20.0-40.0       

          



[facility] DallasUnfawmeCPOQIRWBEI9451-90-79 05:42:00





             Test Item    Value        Reference Range Interpretation Comments

 

             MCH (test code = MCH) 27.5 pg      27.0-31.0                 



[facility] DallasAweioxwAYXOALFOEU5166-36-10 05:42:00





             Test Item    Value        Reference Range Interpretation Comments

 

             MCV (test code = MCV) 85.3         80.0-94.0                 



[facility] DallasXeuzmsaZJQIMJEHYE8261-04-62 05:42:00





             Test Item    Value        Reference Range Interpretation Comments

 

             Hct (test code = Hct) 43.9         42.0-54.0                 



[facility] DallasRrssbvsVJWSPJOEBT1991-12-40 05:42:00





             Test Item    Value        Reference Range Interpretation Comments

 

             Hgb (test code = Hgb) 14.2         14.0-18.0                 



[facility] DallasGazigrwSBSIQUYOPN2087-53-61 05:42:00





             Test Item    Value        Reference Range Interpretation Comments

 

             WBC (test code = WBC) 7.7          3.7-10.4                  



[facility] DallasEgydarqGEQCSCHBPG6189-43-17 05:42:00





             Test Item    Value        Reference Range Interpretation Comments

 

             RBC (test code = RBC) 5.15         4.70-6.10                 



[facility] DallasWeqmsufPTXYMUSOBW9061-28-57 05:42:00





             Test Item    Value        Reference Range Interpretation Comments

 

             MPV (test code = MPV) 8.4          7.4-10.4                  



[facility] DallasSyvaixyFYBGTFAXNH3758-73-50 05:42:00





             Test Item    Value        Reference Range Interpretation Comments

 

             MCHC (test code = MCHC) 32.3         32.0-36.0                 



[facility] DallasNomgufwVJRHCLUNLD7307-88-02 05:42:00





             Test Item    Value        Reference Range Interpretation Comments

 

             RDW (test code = RDW) 17.3         11.5-14.5                 



[facility] DallasIctmvufIZFKVTOBLJ5491-43-69 05:42:00





             Test Item    Value        Reference Range Interpretation Comments

 

             Platelet (test code = Platelet) 317          133-450               

    



[facility]2018-05-07 09:36:00





             Test Item    Value        Reference Range Interpretation Comments

 

             Globulin (test code = Globulin) 4.4          2.7-4.2               

    



[facility]2018-05-07 09:36:00





             Test Item    Value        Reference Range Interpretation Comments

 

             A/G Ratio (test code = A/G Ratio) 0.6 1        0.7-1.6             

      



[facility]2018-05-07 09:36:00





             Test Item    Value        Reference Range Interpretation Comments

 

             B/C Ratio (test code = B/C Ratio) 17 1         6-25                

      



[facility]2018-05-07 09:36:00





             Test Item    Value        Reference Range Interpretation Comments

 

             AGAP (test code = AGAP) 11.3         10.0-20.0                 



[facility]2018-05-07 09:36:00





             Test Item    Value        Reference Range Interpretation Comments

 

             eGFR (test code = eGFR) 134                                    



[facility]2018-05-07 09:36:00





             Test Item    Value        Reference Range Interpretation Comments

 

             Creatinine Lvl (test code = Creatinine 0.84         0.50-1.40      

           



             Lvl)                                                



[facility]2018-05-07 09:36:00





             Test Item    Value        Reference Range Interpretation Comments

 

             Sodium Lvl (test code = Sodium Lvl) 142          135-145           

        



[place] 691878-05-07 09:36:00





             Test Item    Value        Reference Range Interpretation Comments

 

             Glucose Lvl (test code = Glucose Lvl) 99           70-99           

          



[facility]2018-05-07 09:36:00





             Test Item    Value        Reference Range Interpretation Comments

 

             BUN (test code = BUN) 14           7-22                      



[place] 691878-05-07 09:36:00





             Test Item    Value        Reference Range Interpretation Comments

 

             Alk Phos (test code = Alk Phos) 79                           

    



[facility]2018-05-07 09:36:00





             Test Item    Value        Reference Range Interpretation Comments

 

             Bili Total (test code = Bili Total) 0.3          0.2-1.3           

        



[place] 691878-05-07 09:36:00





             Test Item    Value        Reference Range Interpretation Comments

 

             AST (test code = AST) 14           See_Comment                [Auto

mated message] The



                                                                 system which ge

nerated this



                                                                 result transmit

huma



                                                                 reference range

: <=37. The



                                                                 reference range

 was not



                                                                 used to interpr

et this



                                                                 result as zayda

l/abnormal.



[place] 691878-05-07 09:36:00





             Test Item    Value        Reference Range Interpretation Comments

 

             ALT (test code = ALT) 43           See_Comment                [Auto

mated message] The



                                                                 system which ge

nerated this



                                                                 result transmit

huma



                                                                 reference range

: <=65. The



                                                                 reference range

 was not



                                                                 used to interpr

et this



                                                                 result as zayda

l/abnormal.



[facility]2018-05-07 09:36:00





             Test Item    Value        Reference Range Interpretation Comments

 

             Total Protein (test code = Total 7.1          6.4-8.4              

     



             Protein)                                            



[place] 691878-05-07 09:36:00





             Test Item    Value        Reference Range Interpretation Comments

 

             Albumin Lvl (test code = Albumin Lvl) 2.7          3.5-5.0         

          



[place] 691878-05-07 09:36:00





             Test Item    Value        Reference Range Interpretation Comments

 

             Calcium Lvl (test code = Calcium Lvl) 9.2          8.5-10.5        

          



[place] 691878-05-07 09:36:00





             Test Item    Value        Reference Range Interpretation Comments

 

             CO2 (test code = CO2) 21           24-32                     



[facility]2018-05-07 09:36:00





             Test Item    Value        Reference Range Interpretation Comments

 

             Potassium Lvl (test code = Potassium 4.3          3.5-5.1          

         



             Lvl)                                                



[facility]2018-05-07 09:36:00





             Test Item    Value        Reference Range Interpretation Comments

 

             Chloride Lvl (test code = Chloride Lvl) 114                  

            



[facility] DallasKomrzyiYCXCNPSGPE3131-74-50 09:36:00





             Test Item    Value        Reference Range Interpretation Comments

 

             MCHC (test code = MCHC) 32.5         32.0-36.0                 



[facility] DallasVsdixwgDINPNTICGU5961-79-83 09:36:00





             Test Item    Value        Reference Range Interpretation Comments

 

             RDW (test code = RDW) 17.5         11.5-14.5                 



[facility] DallasJwpaqnkGQEFHMXMSE5252-54-35 09:36:00





             Test Item    Value        Reference Range Interpretation Comments

 

             Platelet (test code = Platelet) 400          133-450               

    



[facility] DallasSjdipkcPXRPTIVQKZ6961-80-25 09:36:00





             Test Item    Value        Reference Range Interpretation Comments

 

             MPV (test code = MPV) 8.5          7.4-10.4                  



[facility] DallasIjawgjzMYCJLQYODE6272-07-05 09:36:00





             Test Item    Value        Reference Range Interpretation Comments

 

             WBC (test code = WBC) 6.6          3.7-10.4                  



[facility] DallasBmqczrcRWYFULKPBP1541-24-55 09:36:00





             Test Item    Value        Reference Range Interpretation Comments

 

             RBC (test code = RBC) 5.17         4.70-6.10                 



[facility] DallasNbgemayGJOEDPETXO2631-83-18 09:36:00





             Test Item    Value        Reference Range Interpretation Comments

 

             MCV (test code = MCV) 86.1         80.0-94.0                 



[place] [address]-05-07 09:36:00





             Test Item    Value        Reference Range Interpretation Comments

 

             Hct (test code = Hct) 44.5         42.0-54.0                 



[facility] DallasGkjibjcHRISAVCEJT6378-97-00 09:36:00





             Test Item    Value        Reference Range Interpretation Comments

 

             MCH (test code = MCH) 28.0 pg      27.0-31.0                 



[facility] DallasDzyopymBCZZYYABXB2755-98-98 09:36:00





             Test Item    Value        Reference Range Interpretation Comments

 

             Hgb (test code = Hgb) 14.5         14.0-18.0                 



[facility] DallasRjvnbrmBRXLBGHIHN7941-16-02 09:36:00





             Test Item    Value        Reference Range Interpretation Comments

 

             Lymphocytes # (test code = Lymphocytes 1.7          1.0-5.5        

           



             #)                                                  



[facility] DallasNaxyqjeKTIMKZOZBP5833-47-36 09:36:00





             Test Item    Value        Reference Range Interpretation Comments

 

             Monocytes # (test code 0.7          See_Comment                [Aut

omated message] The



             = Monocytes #)                                        system which 

generated



                                                                 this result tra

nsmitted



                                                                 reference range

: <=0.8.



                                                                 The reference r

zhen was



                                                                 not used to int

erpret



                                                                 this result as



                                                                 normal/abnormal

.



[facility] DallasTblszxxZQFFKWSRWI1397-23-41 09:36:00





             Test Item    Value        Reference Range Interpretation Comments

 

             Eosinophils # (test code 0.2          See_Comment                [A

utomated message] The



             = Eosinophils #)                                        system whic

h generated



                                                                 this result tra

nsmitted



                                                                 reference range

: <=0.5.



                                                                 The reference r

zhen was



                                                                 not used to int

erpret



                                                                 this result as



                                                                 normal/abnormal

.



[facility] DallasMzlrknuFQKZIPYDBE4045-99-18 09:36:00





             Test Item    Value        Reference Range Interpretation Comments

 

             Lymphocytes (test code = Lymphocytes) 26.1         20.0-40.0       

          



[facility] DallasQyrcbecIUDWTBZLHJ0665-74-62 09:36:00





             Test Item    Value        Reference Range Interpretation Comments

 

             Segs (test code = Segs) 59.3         45.0-75.0                 



[facility] DallasHwiiphvNVUQMOHHEF8336-44-93 09:36:00





             Test Item    Value        Reference Range Interpretation Comments

 

             Basophils (test code = 0.8          See_Comment                [Aut

omated message] The



             Basophils)                                          system which ge

nerated



                                                                 this result tra

nsmitted



                                                                 reference range

: <=1.0.



                                                                 The reference r

zhen was



                                                                 not used to int

erpret



                                                                 this result as



                                                                 normal/abnormal

.



[facility] DallasYcgmnibISEKMSUGEV7251-41-98 09:36:00





             Test Item    Value        Reference Range Interpretation Comments

 

             Monocytes (test code = Monocytes) 10.5         2.0-12.0            

      



[facility] DallasUqohirrRSOUFNJZRD7816-93-20 09:36:00





             Test Item    Value        Reference Range Interpretation Comments

 

             Eosinophils (test code = 3.3          See_Comment                [A

utomated message] The



             Eosinophils)                                        system which ge

nerated



                                                                 this result tra

nsmitted



                                                                 reference range

: <=4.0.



                                                                 The reference r

zhen was



                                                                 not used to int

erpret



                                                                 this result as



                                                                 normal/abnormal

.



[facility] DallasGorcywmVQZMMARZVL2484-71-65 09:36:00





             Test Item    Value        Reference Range Interpretation Comments

 

             Segs-Bands # (test code = Segs-Bands #) 3.9          1.5-8.1       

            



[facility]2018-05-07 09:36:00





             Test Item    Value        Reference Range Interpretation Comments

 

             Globulin (test code = Globulin) 4.4          2.7-4.2               

    



[facility]2018-05-07 09:36:00





             Test Item    Value        Reference Range Interpretation Comments

 

             A/G Ratio (test code = A/G Ratio) 0.6 1        0.7-1.6             

      



[place] 691878-05-07 09:36:00





             Test Item    Value        Reference Range Interpretation Comments

 

             B/C Ratio (test code = B/C Ratio) 17 1         6-25                

      



[place] 691878-05-07 09:36:00





             Test Item    Value        Reference Range Interpretation Comments

 

             AGAP (test code = AGAP) 11.3         10.0-20.0                 



[facility]2018-05-07 09:36:00





             Test Item    Value        Reference Range Interpretation Comments

 

             eGFR (test code = eGFR) 134                                    



[facility]2018-05-07 09:36:00





             Test Item    Value        Reference Range Interpretation Comments

 

             Creatinine Lvl (test code = Creatinine 0.84         0.50-1.40      

           



             Lvl)                                                



[facility]2018-05-07 09:36:00





             Test Item    Value        Reference Range Interpretation Comments

 

             Sodium Lvl (test code = Sodium Lvl) 142          135-145           

        



[facility]2018-05-07 09:36:00





             Test Item    Value        Reference Range Interpretation Comments

 

             Glucose Lvl (test code = Glucose Lvl) 99           70-99           

          



[facility]2018-05-07 09:36:00





             Test Item    Value        Reference Range Interpretation Comments

 

             BUN (test code = BUN) 14           7-22                      



[place] 691878-05-07 09:36:00





             Test Item    Value        Reference Range Interpretation Comments

 

             Alk Phos (test code = Alk Phos) 79                           

    



[place] 691878-05-07 09:36:00





             Test Item    Value        Reference Range Interpretation Comments

 

             Bili Total (test code = Bili Total) 0.3          0.2-1.3           

        



[place] [address]-05-07 09:36:00





             Test Item    Value        Reference Range Interpretation Comments

 

             AST (test code = AST) 14           See_Comment                [Auto

mated message] The



                                                                 system which ge

nerated this



                                                                 result transmit

huma



                                                                 reference range

: <=37. The



                                                                 reference range

 was not



                                                                 used to interpr

et this



                                                                 result as zayda

l/abnormal.



[place] 691878-05-07 09:36:00





             Test Item    Value        Reference Range Interpretation Comments

 

             ALT (test code = ALT) 43           See_Comment                [Auto

mated message] The



                                                                 system which ge

nerated this



                                                                 result transmit

huma



                                                                 reference range

: <=65. The



                                                                 reference range

 was not



                                                                 used to interpr

et this



                                                                 result as zayda

l/abnormal.



[place] [address]-05-07 09:36:00





             Test Item    Value        Reference Range Interpretation Comments

 

             Total Protein (test code = Total 7.1          6.4-8.4              

     



             Protein)                                            



[place]1878-05-07 09:36:00





             Test Item    Value        Reference Range Interpretation Comments

 

             Albumin Lvl (test code = Albumin Lvl) 2.7          3.5-5.0         

          



[place] 691878-05-07 09:36:00





             Test Item    Value        Reference Range Interpretation Comments

 

             Calcium Lvl (test code = Calcium Lvl) 9.2          8.5-10.5        

          



[place] 691878-05-07 09:36:00





             Test Item    Value        Reference Range Interpretation Comments

 

             CO2 (test code = CO2) 21           24-32                     



[place] 691878-05-07 09:36:00





             Test Item    Value        Reference Range Interpretation Comments

 

             Potassium Lvl (test code = Potassium 4.3          3.5-5.1          

         



             Lvl)                                                



[place] 691878-05-07 09:36:00





             Test Item    Value        Reference Range Interpretation Comments

 

             Chloride Lvl (test code = Chloride Lvl) 114                  

            



[facility]UB[facility]15[facility]





             Test Item    Value        Reference Range Interpretation Comments

 

             MCHC (test code = MCHC) 32.5         32.0-36.0                 



[facility] DallasRuqzlnmCZBZNDTSFR3872-90-20 09:36:00





             Test Item    Value        Reference Range Interpretation Comments

 

             RDW (test code = RDW) 17.5         11.5-14.5                 



[place] 489578-05-07 09:36:00





             Test Item    Value        Reference Range Interpretation Comments

 

             Platelet (test code = Platelet) 400          133-450               

    



[facility] DallasHhdaqwpDICROLQORW6910-95-90 09:36:00





             Test Item    Value        Reference Range Interpretation Comments

 

             MPV (test code = MPV) 8.5          7.4-10.4                  



[facility] DallasOiqhdbwCMNGIFWWMO5496-26-87 09:36:00





             Test Item    Value        Reference Range Interpretation Comments

 

             WBC (test code = WBC) 6.6          3.7-10.4                  



[facility] DallasIochklpLKCSRPAEFH3564-36-43 09:36:00





             Test Item    Value        Reference Range Interpretation Comments

 

             RBC (test code = RBC) 5.17         4.70-6.10                 



[facility] DallasRjvyyweDYSNRFLXWS4113-36-97 09:36:00





             Test Item    Value        Reference Range Interpretation Comments

 

             MCV (test code = MCV) 86.1         80.0-94.0                 



[facility] DallasKslydqbBCRNVFJQOT2942-15-33 09:36:00





             Test Item    Value        Reference Range Interpretation Comments

 

             Hct (test code = Hct) 44.5         42.0-54.0                 



[facility] DallasWjtgqxuHHASHKEKJE2421-20-23 09:36:00





             Test Item    Value        Reference Range Interpretation Comments

 

             MCH (test code = MCH) 28.0 pg      27.0-31.0                 



[facility] DallasNotufdaZUWMXVZEJJ4653-61-68 09:36:00





             Test Item    Value        Reference Range Interpretation Comments

 

             Hgb (test code = Hgb) 14.5         14.0-18.0                 



[facility] DallasTkpnovmXNMAGTPOQD7115-69-40 09:36:00





             Test Item    Value        Reference Range Interpretation Comments

 

             Lymphocytes # (test code = Lymphocytes 1.7          1.0-5.5        

           



             #)                                                  



[facility] DallasPtxjzckTFNQNNTGMW9596-48-01 09:36:00





             Test Item    Value        Reference Range Interpretation Comments

 

             Monocytes # (test code 0.7          See_Comment                [Aut

omated message] The



             = Monocytes #)                                        system which 

generated



                                                                 this result tra

nsmitted



                                                                 reference range

: <=0.8.



                                                                 The reference r

zhen was



                                                                 not used to int

erpret



                                                                 this result as



                                                                 normal/abnormal

.



[facility] DallasAhylivcRCJQVSLXXK5109-47-42 09:36:00





             Test Item    Value        Reference Range Interpretation Comments

 

             Eosinophils # (test code 0.2          See_Comment                [A

utomated message] The



             = Eosinophils #)                                        system whic

h generated



                                                                 this result tra

nsmitted



                                                                 reference range

: <=0.5.



                                                                 The reference r

zhen was



                                                                 not used to int

erpret



                                                                 this result as



                                                                 normal/abnormal

.



[facility] DallasYzlzurlHONGNVGHXZ2440-56-02 09:36:00





             Test Item    Value        Reference Range Interpretation Comments

 

             Lymphocytes (test code = Lymphocytes) 26.1         20.0-40.0       

          



[facility] DallasQvokgkoMQMVTTLCHY3601-52-35 09:36:00





             Test Item    Value        Reference Range Interpretation Comments

 

             Segs (test code = Segs) 59.3         45.0-75.0                 



[facility] DallasEaohmguVZIYYOONCE8696-75-45 09:36:00





             Test Item    Value        Reference Range Interpretation Comments

 

             Basophils (test code = 0.8          See_Comment                [Aut

omated message] The



             Basophils)                                          system which ge

nerated



                                                                 this result tra

nsmitted



                                                                 reference range

: <=1.0.



                                                                 The reference r

zhen was



                                                                 not used to int

erpret



                                                                 this result as



                                                                 normal/abnormal

.



[facility] DallasNsxktudPRISLJBCDE1146-55-35 09:36:00





             Test Item    Value        Reference Range Interpretation Comments

 

             Monocytes (test code = Monocytes) 10.5         2.0-12.0            

      



[facility] DallasGmyaqpzWWIHOBDSFO4029-95-98 09:36:00





             Test Item    Value        Reference Range Interpretation Comments

 

             Eosinophils (test code = 3.3          See_Comment                [A

utomated message] The



             Eosinophils)                                        system which ge

nerated



                                                                 this result tra

nsmitted



                                                                 reference range

: <=4.0.



                                                                 The reference r

zhen was



                                                                 not used to int

erpret



                                                                 this result as



                                                                 normal/abnormal

.



[facility] DallasHdbrrdqJOQHPYDHII9476-85-65 09:36:00





             Test Item    Value        Reference Range Interpretation Comments

 

             Segs-Bands # (test code = Segs-Bands #) 3.9          1.5-8.1       

            



[facility]2018-05-07 09:36:00





             Test Item    Value        Reference Range Interpretation Comments

 

             Globulin (test code = Globulin) 4.4          2.7-4.2               

    



[facility]2018-05-07 09:36:00





             Test Item    Value        Reference Range Interpretation Comments

 

             A/G Ratio (test code = A/G Ratio) 0.6 1        0.7-1.6             

      



[facility]2018-05-07 09:36:00





             Test Item    Value        Reference Range Interpretation Comments

 

             B/C Ratio (test code = B/C Ratio) 17 1         6-25                

      



[facility]2018-05-07 09:36:00





             Test Item    Value        Reference Range Interpretation Comments

 

             AGAP (test code = AGAP) 11.3         10.0-20.0                 



[place]1878-05-07 09:36:00





             Test Item    Value        Reference Range Interpretation Comments

 

             eGFR (test code = eGFR) 134                                    



[place]1878-05-07 09:36:00





             Test Item    Value        Reference Range Interpretation Comments

 

             Creatinine Lvl (test code = Creatinine 0.84         0.50-1.40      

           



             Lvl)                                                



[facility]2018-05-07 09:36:00





             Test Item    Value        Reference Range Interpretation Comments

 

             Sodium Lvl (test code = Sodium Lvl) 142          135-145           

        



[place]1878-05-07 09:36:00





             Test Item    Value        Reference Range Interpretation Comments

 

             Glucose Lvl (test code = Glucose Lvl) 99           70-99           

          



[place] 691878-05-07 09:36:00





             Test Item    Value        Reference Range Interpretation Comments

 

             BUN (test code = BUN) 14           7-22                      



[place] 691878-05-07 09:36:00





             Test Item    Value        Reference Range Interpretation Comments

 

             Alk Phos (test code = Alk Phos) 79                           

    



[place] 691878-05-07 09:36:00





             Test Item    Value        Reference Range Interpretation Comments

 

             Bili Total (test code = Bili Total) 0.3          0.2-1.3           

        



[place] 691878-05-07 09:36:00





             Test Item    Value        Reference Range Interpretation Comments

 

             AST (test code = AST) 14           See_Comment                [Auto

mated message] The



                                                                 system which ge

nerated this



                                                                 result transmit

huma



                                                                 reference range

: <=37. The



                                                                 reference range

 was not



                                                                 used to interpr

et this



                                                                 result as zayda

l/abnormal.



[place]1878-05-07 09:36:00





             Test Item    Value        Reference Range Interpretation Comments

 

             ALT (test code = ALT) 43           See_Comment                [Auto

mated message] The



                                                                 system which ge

nerated this



                                                                 result transmit

huma



                                                                 reference range

: <=65. The



                                                                 reference range

 was not



                                                                 used to interpr

et this



                                                                 result as zayda

l/abnormal.



[place]1878-05-07 09:36:00





             Test Item    Value        Reference Range Interpretation Comments

 

             Total Protein (test code = Total 7.1          6.4-8.4              

     



             Protein)                                            



[place]1878-05-07 09:36:00





             Test Item    Value        Reference Range Interpretation Comments

 

             Albumin Lvl (test code = Albumin Lvl) 2.7          3.5-5.0         

          



[facility]2018-05-07 09:36:00





             Test Item    Value        Reference Range Interpretation Comments

 

             Calcium Lvl (test code = Calcium Lvl) 9.2          8.5-10.5        

          



[facility]2018-05-07 09:36:00





             Test Item    Value        Reference Range Interpretation Comments

 

             CO2 (test code = CO2) 21           24-32                     



[facility]2018-05-07 09:36:00





             Test Item    Value        Reference Range Interpretation Comments

 

             Potassium Lvl (test code = Potassium 4.3          3.5-5.1          

         



             Lvl)                                                



[facility]2018-05-07 09:36:00





             Test Item    Value        Reference Range Interpretation Comments

 

             Chloride Lvl (test code = Chloride Lvl) 114                  

            



[facility] DallasTqrddsrJYMILHAEBA3974-62-99 09:36:00





             Test Item    Value        Reference Range Interpretation Comments

 

             MCHC (test code = MCHC) 32.5         32.0-36.0                 



[facility] DallasPxhhcxpXXNVYWPXHE0210-89-56 09:36:00





             Test Item    Value        Reference Range Interpretation Comments

 

             RDW (test code = RDW) 17.5         11.5-14.5                 



[facility] DallasGqdzznsLSCOVDGTIG1949-48-76 09:36:00





             Test Item    Value        Reference Range Interpretation Comments

 

             Platelet (test code = Platelet) 400          133-450               

    



[facility] DallasEyhkdmoSKXAFWJTCE4178-00-15 09:36:00





             Test Item    Value        Reference Range Interpretation Comments

 

             MPV (test code = MPV) 8.5          7.4-10.4                  



[facility] DallasXxntggtCTOPVEQQYA8787-22-46 09:36:00





             Test Item    Value        Reference Range Interpretation Comments

 

             WBC (test code = WBC) 6.6          3.7-10.4                  



[facility] DallasWxklsodQSFPJDAFAH0127-81-76 09:36:00





             Test Item    Value        Reference Range Interpretation Comments

 

             RBC (test code = RBC) 5.17         4.70-6.10                 



[facility] DallasBqtuwrfFFFONVPPVI8905-37-66 09:36:00





             Test Item    Value        Reference Range Interpretation Comments

 

             MCV (test code = MCV) 86.1         80.0-94.0                 



[place]9578-05-07 09:36:00





             Test Item    Value        Reference Range Interpretation Comments

 

             Hct (test code = Hct) 44.5         42.0-54.0                 



[facility] DallasZjkjhlrBUKTTDCNYL8278-10-92 09:36:00





             Test Item    Value        Reference Range Interpretation Comments

 

             MCH (test code = MCH) 28.0 pg      27.0-31.0                 



[facility] DallasJyepzbnARKKXXFMZK7017-51-48 09:36:00





             Test Item    Value        Reference Range Interpretation Comments

 

             Hgb (test code = Hgb) 14.5         14.0-18.0                 



[facility] DallasGpvmyctEZSHRNMCZN4268-27-05 09:36:00





             Test Item    Value        Reference Range Interpretation Comments

 

             Lymphocytes # (test code = Lymphocytes 1.7          1.0-5.5        

           



             #)                                                  



[facility] DallasMgkgeuiWSYHRNGTJC5207-53-08 09:36:00





             Test Item    Value        Reference Range Interpretation Comments

 

             Monocytes # (test code 0.7          See_Comment                [Aut

omated message] The



             = Monocytes #)                                        system which 

generated



                                                                 this result tra

nsmitted



                                                                 reference range

: <=0.8.



                                                                 The reference r

zhen was



                                                                 not used to int

erpret



                                                                 this result as



                                                                 normal/abnormal

.



[facility] DallasVefzscqVZNHFHGQBS8861-17-65 09:36:00





             Test Item    Value        Reference Range Interpretation Comments

 

             Eosinophils # (test code 0.2          See_Comment                [A

utomated message] The



             = Eosinophils #)                                        system whic

h generated



                                                                 this result tra

nsmitted



                                                                 reference range

: <=0.5.



                                                                 The reference r

zhen was



                                                                 not used to int

erpret



                                                                 this result as



                                                                 normal/abnormal

.



[facility] DallasQnbtulyJVAOOTLRZC0308-43-38 09:36:00





             Test Item    Value        Reference Range Interpretation Comments

 

             Lymphocytes (test code = Lymphocytes) 26.1         20.0-40.0       

          



[facility] DallasBbmxjecVHLWXUKUMZ8048-45-10 09:36:00





             Test Item    Value        Reference Range Interpretation Comments

 

             Segs (test code = Segs) 59.3         45.0-75.0                 



[facility] DallasVdtpxbxGPZMNNZCSA6361-03-23 09:36:00





             Test Item    Value        Reference Range Interpretation Comments

 

             Basophils (test code = 0.8          See_Comment                [Aut

omated message] The



             Basophils)                                          system which ge

nerated



                                                                 this result tra

nsmitted



                                                                 reference range

: <=1.0.



                                                                 The reference r

zhen was



                                                                 not used to int

erpret



                                                                 this result as



                                                                 normal/abnormal

.



[facility] DallasOlidofgIQKHKVUYSW2786-48-95 09:36:00





             Test Item    Value        Reference Range Interpretation Comments

 

             Monocytes (test code = Monocytes) 10.5         2.0-12.0            

      



[facility] DallasYpezzlxRKLMCZITXB4398-52-94 09:36:00





             Test Item    Value        Reference Range Interpretation Comments

 

             Eosinophils (test code = 3.3          See_Comment                [A

utomated message] The



             Eosinophils)                                        system which ge

nerated



                                                                 this result tra

nsmitted



                                                                 reference range

: <=4.0.



                                                                 The reference r

zhen was



                                                                 not used to int

erpret



                                                                 this result as



                                                                 normal/abnormal

.



[facility] DallasWjotmkrZHKEUGGMKK0204-86-42 09:36:00





             Test Item    Value        Reference Range Interpretation Comments

 

             Segs-Bands # (test code = Segs-Bands #) 3.9          1.5-8.1       

            



[facility]2018-05-07 09:36:00





             Test Item    Value        Reference Range Interpretation Comments

 

             Globulin (test code = Globulin) 4.4          2.7-4.2               

    



[facility]2018-05-07 09:36:00





             Test Item    Value        Reference Range Interpretation Comments

 

             A/G Ratio (test code = A/G Ratio) 0.6 1        0.7-1.6             

      



[facility]2018-05-07 09:36:00





             Test Item    Value        Reference Range Interpretation Comments

 

             B/C Ratio (test code = B/C Ratio) 17 1         6-25                

      



[facility]2018-05-07 09:36:00





             Test Item    Value        Reference Range Interpretation Comments

 

             AGAP (test code = AGAP) 11.3         10.0-20.0                 



[facility]2018-05-07 09:36:00





             Test Item    Value        Reference Range Interpretation Comments

 

             eGFR (test code = eGFR) 134                                    



[facility]2018-05-07 09:36:00





             Test Item    Value        Reference Range Interpretation Comments

 

             Creatinine Lvl (test code = Creatinine 0.84         0.50-1.40      

           



             Lvl)                                                



[facility]2018-05-07 09:36:00





             Test Item    Value        Reference Range Interpretation Comments

 

             Sodium Lvl (test code = Sodium Lvl) 142          135-145           

        



[facility]2018-05-07 09:36:00





             Test Item    Value        Reference Range Interpretation Comments

 

             Glucose Lvl (test code = Glucose Lvl) 99           70-99           

          



[facility]2018-05-07 09:36:00





             Test Item    Value        Reference Range Interpretation Comments

 

             BUN (test code = BUN) 14           7-22                      



[place]1878-05-07 09:36:00





             Test Item    Value        Reference Range Interpretation Comments

 

             Alk Phos (test code = Alk Phos) 79                           

    



[facility]2018-05-07 09:36:00





             Test Item    Value        Reference Range Interpretation Comments

 

             Bili Total (test code = Bili Total) 0.3          0.2-1.3           

        



[place]1878-05-07 09:36:00





             Test Item    Value        Reference Range Interpretation Comments

 

             AST (test code = AST) 14           See_Comment                [Auto

mated message] The



                                                                 system which ge

nerated this



                                                                 result transmit

huma



                                                                 reference range

: <=37. The



                                                                 reference range

 was not



                                                                 used to interpr

et this



                                                                 result as zayda

l/abnormal.



[place]1878-05-07 09:36:00





             Test Item    Value        Reference Range Interpretation Comments

 

             ALT (test code = ALT) 43           See_Comment                [Auto

mated message] The



                                                                 system which ge

nerated this



                                                                 result transmit

huma



                                                                 reference range

: <=65. The



                                                                 reference range

 was not



                                                                 used to interpr

et this



                                                                 result as zayda

l/abnormal.



[facility]2018-05-07 09:36:00





             Test Item    Value        Reference Range Interpretation Comments

 

             Total Protein (test code = Total 7.1          6.4-8.4              

     



             Protein)                                            



[facility]2018-05-07 09:36:00





             Test Item    Value        Reference Range Interpretation Comments

 

             Albumin Lvl (test code = Albumin Lvl) 2.7          3.5-5.0         

          



[place] 691878-05-07 09:36:00





             Test Item    Value        Reference Range Interpretation Comments

 

             Calcium Lvl (test code = Calcium Lvl) 9.2          8.5-10.5        

          



[place] 691878-05-07 09:36:00





             Test Item    Value        Reference Range Interpretation Comments

 

             CO2 (test code = CO2) 21           24-32                     



[facility]2018-05-07 09:36:00





             Test Item    Value        Reference Range Interpretation Comments

 

             Potassium Lvl (test code = Potassium 4.3          3.5-5.1          

         



             Lvl)                                                



[facility]2018-05-07 09:36:00





             Test Item    Value        Reference Range Interpretation Comments

 

             Chloride Lvl (test code = Chloride Lvl) 114                  

            



[facility]NR[facility]302[facility]





             Test Item    Value        Reference Range Interpretation Comments

 

             MCHC (test code = MCHC) 32.5         32.0-36.0                 



[facility] DallasKzxfgreZOWPJDUPZU2500-19-42 09:36:00





             Test Item    Value        Reference Range Interpretation Comments

 

             RDW (test code = RDW) 17.5         11.5-14.5                 



[facility] DallasElkmcfiVPXHJXOYLV9327-35-27 09:36:00





             Test Item    Value        Reference Range Interpretation Comments

 

             Platelet (test code = Platelet) 400          133-450               

    



[facility] DallasKbpcfrjMJUNYAIGZZ1746-73-35 09:36:00





             Test Item    Value        Reference Range Interpretation Comments

 

             MPV (test code = MPV) 8.5          7.4-10.4                  



[facility] DallasPlasclmOOHJXAYNSC4712-52-57 09:36:00





             Test Item    Value        Reference Range Interpretation Comments

 

             WBC (test code = WBC) 6.6          3.7-10.4                  



[facility] DallasStcymmjCCIKWOOYBG2689-61-45 09:36:00





             Test Item    Value        Reference Range Interpretation Comments

 

             RBC (test code = RBC) 5.17         4.70-6.10                 



[facility] DallasHkzkjhmJMMYLVMIOG1221-59-14 09:36:00





             Test Item    Value        Reference Range Interpretation Comments

 

             MCV (test code = MCV) 86.1         80.0-94.0                 



[facility] DallasMvechazMWFWPMJQNN6385-06-32 09:36:00





             Test Item    Value        Reference Range Interpretation Comments

 

             Hct (test code = Hct) 44.5         42.0-54.0                 



[facility] DallasZgnxydsDSBNTBQUMP0004-25-25 09:36:00





             Test Item    Value        Reference Range Interpretation Comments

 

             MCH (test code = MCH) 28.0 pg      27.0-31.0                 



[facility] DallasMpzkwyzZWQINWSEMA5606-63-52 09:36:00





             Test Item    Value        Reference Range Interpretation Comments

 

             Hgb (test code = Hgb) 14.5         14.0-18.0                 



[facility] DallasOwtxpydLXECQPVHVU8344-42-95 09:36:00





             Test Item    Value        Reference Range Interpretation Comments

 

             Lymphocytes # (test code = Lymphocytes 1.7          1.0-5.5        

           



             #)                                                  



[facility] DallasTqpxrpyFBQNLUZUXY4486-22-36 09:36:00





             Test Item    Value        Reference Range Interpretation Comments

 

             Monocytes # (test code 0.7          See_Comment                [Aut

omated message] The



             = Monocytes #)                                        system which 

generated



                                                                 this result tra

nsmitted



                                                                 reference range

: <=0.8.



                                                                 The reference r

zhen was



                                                                 not used to int

erpret



                                                                 this result as



                                                                 normal/abnormal

.



[facility] DallasYfxoqhdLNROSXDBTW3457-63-71 09:36:00





             Test Item    Value        Reference Range Interpretation Comments

 

             Eosinophils # (test code 0.2          See_Comment                [A

utomated message] The



             = Eosinophils #)                                        system whic

h generated



                                                                 this result tra

nsmitted



                                                                 reference range

: <=0.5.



                                                                 The reference r

zhen was



                                                                 not used to int

erpret



                                                                 this result as



                                                                 normal/abnormal

.



[facility] DallasCulohbxDKETAPMLCM0226-38-84 09:36:00





             Test Item    Value        Reference Range Interpretation Comments

 

             Lymphocytes (test code = Lymphocytes) 26.1         20.0-40.0       

          



[facility] DallasBliknxqLVXQUFYCEA1345-21-47 09:36:00





             Test Item    Value        Reference Range Interpretation Comments

 

             Segs (test code = Segs) 59.3         45.0-75.0                 



[facility] DallasFvaeycsDBRZKPJXJP2640-90-17 09:36:00





             Test Item    Value        Reference Range Interpretation Comments

 

             Basophils (test code = 0.8          See_Comment                [Aut

omated message] The



             Basophils)                                          system which ge

nerated



                                                                 this result tra

nsmitted



                                                                 reference range

: <=1.0.



                                                                 The reference r

zhen was



                                                                 not used to int

erpret



                                                                 this result as



                                                                 normal/abnormal

.



[facility] DallasZmuficzKJEOPJYAAP2748-63-01 09:36:00





             Test Item    Value        Reference Range Interpretation Comments

 

             Monocytes (test code = Monocytes) 10.5         2.0-12.0            

      



[facility] DallasCsevqnhTRCLJWLVRU7961-60-74 09:36:00





             Test Item    Value        Reference Range Interpretation Comments

 

             Eosinophils (test code = 3.3          See_Comment                [A

utomated message] The



             Eosinophils)                                        system which ge

nerated



                                                                 this result tra

nsmitted



                                                                 reference range

: <=4.0.



                                                                 The reference r

zhen was



                                                                 not used to int

erpret



                                                                 this result as



                                                                 normal/abnormal

.



[facility] DallasTznxaepGRNHFGEEHX1382-54-40 09:36:00





             Test Item    Value        Reference Range Interpretation Comments

 

             Segs-Bands # (test code = Segs-Bands #) 3.9          1.5-8.1       

            



[id number]00





             Test Item    Value        Reference Range Interpretation Comments

 

             Globulin (test code = Globulin) 4.4          2.7-4.2               

    



[facility]2018-05-07 09:36:00





             Test Item    Value        Reference Range Interpretation Comments

 

             A/G Ratio (test code = A/G Ratio) 0.6 1        0.7-1.6             

      



[facility]2018-05-07 09:36:00





             Test Item    Value        Reference Range Interpretation Comments

 

             B/C Ratio (test code = B/C Ratio) 17 1         6-25                

      



[facility]2018-05-07 09:36:00





             Test Item    Value        Reference Range Interpretation Comments

 

             AGAP (test code = AGAP) 11.3         10.0-20.0                 



[facility]2018-05-07 09:36:00





             Test Item    Value        Reference Range Interpretation Comments

 

             eGFR (test code = eGFR) 134                                    



[place]1878-05-07 09:36:00





             Test Item    Value        Reference Range Interpretation Comments

 

             Creatinine Lvl (test code = Creatinine 0.84         0.50-1.40      

           



             Lvl)                                                



[place]1878-05-07 09:36:00





             Test Item    Value        Reference Range Interpretation Comments

 

             Sodium Lvl (test code = Sodium Lvl) 142          135-145           

        



[place] 691878-05-07 09:36:00





             Test Item    Value        Reference Range Interpretation Comments

 

             Glucose Lvl (test code = Glucose Lvl) 99           70-99           

          



[place]1878-05-07 09:36:00





             Test Item    Value        Reference Range Interpretation Comments

 

             BUN (test code = BUN) 14           7-22                      



[place]1878-05-07 09:36:00





             Test Item    Value        Reference Range Interpretation Comments

 

             Alk Phos (test code = Alk Phos) 79                           

    



[place] 691878-05-07 09:36:00





             Test Item    Value        Reference Range Interpretation Comments

 

             Bili Total (test code = Bili Total) 0.3          0.2-1.3           

        



[place]1878-05-07 09:36:00





             Test Item    Value        Reference Range Interpretation Comments

 

             AST (test code = AST) 14           See_Comment                [Auto

mated message] The



                                                                 system which ge

nerated this



                                                                 result transmit

huma



                                                                 reference range

: <=37. The



                                                                 reference range

 was not



                                                                 used to interpr

et this



                                                                 result as zayda

l/abnormal.



[place]1878-05-07 09:36:00





             Test Item    Value        Reference Range Interpretation Comments

 

             ALT (test code = ALT) 43           See_Comment                [Auto

mated message] The



                                                                 system which ge

nerated this



                                                                 result transmit

huma



                                                                 reference range

: <=65. The



                                                                 reference range

 was not



                                                                 used to interpr

et this



                                                                 result as zayda

l/abnormal.



[place]1878-05-07 09:36:00





             Test Item    Value        Reference Range Interpretation Comments

 

             Total Protein (test code = Total 7.1          6.4-8.4              

     



             Protein)                                            



[facility]2018-05-07 09:36:00





             Test Item    Value        Reference Range Interpretation Comments

 

             Albumin Lvl (test code = Albumin Lvl) 2.7          3.5-5.0         

          



[facility]2018-05-07 09:36:00





             Test Item    Value        Reference Range Interpretation Comments

 

             Calcium Lvl (test code = Calcium Lvl) 9.2          8.5-10.5        

          



[facility]2018-05-07 09:36:00





             Test Item    Value        Reference Range Interpretation Comments

 

             CO2 (test code = CO2) 21           24-32                     



[place]1878-05-07 09:36:00





             Test Item    Value        Reference Range Interpretation Comments

 

             Potassium Lvl (test code = Potassium 4.3          3.5-5.1          

         



             Lvl)                                                



[place]1878-05-07 09:36:00





             Test Item    Value        Reference Range Interpretation Comments

 

             Chloride Lvl (test code = Chloride Lvl) 114                  

            



[facility] DallasBlfezglKDCNIJBXIS9567-28-49 09:36:00





             Test Item    Value        Reference Range Interpretation Comments

 

             MCHC (test code = MCHC) 32.5         32.0-36.0                 



[facility] DallasEhwmkaxIGZZMPAYZX4159-86-92 09:36:00





             Test Item    Value        Reference Range Interpretation Comments

 

             RDW (test code = RDW) 17.5         11.5-14.5                 



[facility] DallasZusvbhyUFQLKQRAOO5941-70-91 09:36:00





             Test Item    Value        Reference Range Interpretation Comments

 

             Platelet (test code = Platelet) 400          133-450               

    



[facility] DallasNpcwgwaMKYRWDXVPQ4545-66-45 09:36:00





             Test Item    Value        Reference Range Interpretation Comments

 

             MPV (test code = MPV) 8.5          7.4-10.4                  



[facility] DallasLicabiyPLKMGYEGDM3140-84-15 09:36:00





             Test Item    Value        Reference Range Interpretation Comments

 

             WBC (test code = WBC) 6.6          3.7-10.4                  



[facility] DallasHdaqgtvCKOTIUZKQB8444-53-82 09:36:00





             Test Item    Value        Reference Range Interpretation Comments

 

             RBC (test code = RBC) 5.17         4.70-6.10                 



[facility] DallasEjnbupkDMJAFGRQLL4185-00-41 09:36:00





             Test Item    Value        Reference Range Interpretation Comments

 

             MCV (test code = MCV) 86.1         80.0-94.0                 



[place]9578-05-07 09:36:00





             Test Item    Value        Reference Range Interpretation Comments

 

             Hct (test code = Hct) 44.5         42.0-54.0                 



[facility] DallasJkvwuiqZYMKKOVTLI5767-59-67 09:36:00





             Test Item    Value        Reference Range Interpretation Comments

 

             MCH (test code = MCH) 28.0 pg      27.0-31.0                 



[facility] DallasAtnitvnYJWLJHSEJR1566-44-49 09:36:00





             Test Item    Value        Reference Range Interpretation Comments

 

             Hgb (test code = Hgb) 14.5         14.0-18.0                 



[facility] DallasXxxxxilGOYTLRWWIH9782-40-14 09:36:00





             Test Item    Value        Reference Range Interpretation Comments

 

             Lymphocytes # (test code = Lymphocytes 1.7          1.0-5.5        

           



             #)                                                  



[facility] DallasOznncfwHFSOXFGUSF0908-13-11 09:36:00





             Test Item    Value        Reference Range Interpretation Comments

 

             Monocytes # (test code 0.7          See_Comment                [Aut

omated message] The



             = Monocytes #)                                        system which 

generated



                                                                 this result tra

nsmitted



                                                                 reference range

: <=0.8.



                                                                 The reference r

zhen was



                                                                 not used to int

erpret



                                                                 this result as



                                                                 normal/abnormal

.



[facility] DallasPorajnpUNEGREHELP0337-93-94 09:36:00





             Test Item    Value        Reference Range Interpretation Comments

 

             Eosinophils # (test code 0.2          See_Comment                [A

utomated message] The



             = Eosinophils #)                                        system whic

h generated



                                                                 this result tra

nsmitted



                                                                 reference range

: <=0.5.



                                                                 The reference r

zhen was



                                                                 not used to int

erpret



                                                                 this result as



                                                                 normal/abnormal

.



[facility] DallasInmeeycHIIKQYHKBK2976-24-14 09:36:00





             Test Item    Value        Reference Range Interpretation Comments

 

             Lymphocytes (test code = Lymphocytes) 26.1         20.0-40.0       

          



[facility] DallasIpjuoymQUZQZAIVRL4302-98-48 09:36:00





             Test Item    Value        Reference Range Interpretation Comments

 

             Segs (test code = Segs) 59.3         45.0-75.0                 



[facility] DallasNuwvrmxSXTZFYQNAL4211-33-50 09:36:00





             Test Item    Value        Reference Range Interpretation Comments

 

             Basophils (test code = 0.8          See_Comment                [Aut

omated message] The



             Basophils)                                          system which ge

nerated



                                                                 this result tra

nsmitted



                                                                 reference range

: <=1.0.



                                                                 The reference r

zhen was



                                                                 not used to int

erpret



                                                                 this result as



                                                                 normal/abnormal

.



[facility] DallasTszbweyIABPQNQMUX1645-82-98 09:36:00





             Test Item    Value        Reference Range Interpretation Comments

 

             Monocytes (test code = Monocytes) 10.5         2.0-12.0            

      



[facility] DallasWguhjfrCCJCNMTFDR6440-39-39 09:36:00





             Test Item    Value        Reference Range Interpretation Comments

 

             Eosinophils (test code = 3.3          See_Comment                [A

utomated message] The



             Eosinophils)                                        system which ge

nerated



                                                                 this result tra

nsmitted



                                                                 reference range

: <=4.0.



                                                                 The reference r

zhen was



                                                                 not used to int

erpret



                                                                 this result as



                                                                 normal/abnormal

.



[place] [address]93-05-07 09:36:00





             Test Item    Value        Reference Range Interpretation Comments

 

             Segs-Bands # (test code = Segs-Bands #) 3.9          1.5-8.1       

            



[place]1878-05-07 09:36:00





             Test Item    Value        Reference Range Interpretation Comments

 

             Globulin (test code = Globulin) 4.4          2.7-4.2               

    



[facility]2018-05-07 09:36:00





             Test Item    Value        Reference Range Interpretation Comments

 

             A/G Ratio (test code = A/G Ratio) 0.6 1        0.7-1.6             

      



[place]1878-05-07 09:36:00





             Test Item    Value        Reference Range Interpretation Comments

 

             B/C Ratio (test code = B/C Ratio) 17 1         6-25                

      



[facility]2018-05-07 09:36:00





             Test Item    Value        Reference Range Interpretation Comments

 

             AGAP (test code = AGAP) 11.3         10.0-20.0                 



[facility]2018-05-07 09:36:00





             Test Item    Value        Reference Range Interpretation Comments

 

             eGFR (test code = eGFR) 134                                    



[facility]2018-05-07 09:36:00





             Test Item    Value        Reference Range Interpretation Comments

 

             Creatinine Lvl (test code = Creatinine 0.84         0.50-1.40      

           



             Lvl)                                                



[facility]2018-05-07 09:36:00





             Test Item    Value        Reference Range Interpretation Comments

 

             Sodium Lvl (test code = Sodium Lvl) 142          135-145           

        



[place]1878-05-07 09:36:00





             Test Item    Value        Reference Range Interpretation Comments

 

             Glucose Lvl (test code = Glucose Lvl) 99           70-99           

          



[facility]2018-05-07 09:36:00





             Test Item    Value        Reference Range Interpretation Comments

 

             BUN (test code = BUN) 14           7-22                      



[facility]2018-05-07 09:36:00





             Test Item    Value        Reference Range Interpretation Comments

 

             Alk Phos (test code = Alk Phos) 79                           

    



[facility]2018-05-07 09:36:00





             Test Item    Value        Reference Range Interpretation Comments

 

             Bili Total (test code = Bili Total) 0.3          0.2-1.3           

        



[facility]2018-05-07 09:36:00





             Test Item    Value        Reference Range Interpretation Comments

 

             AST (test code = AST) 14           See_Comment                [Auto

mated message] The



                                                                 system which ge

nerated this



                                                                 result transmit

huma



                                                                 reference range

: <=37. The



                                                                 reference range

 was not



                                                                 used to interpr

et this



                                                                 result as zayda

l/abnormal.



[facility]2018-05-07 09:36:00





             Test Item    Value        Reference Range Interpretation Comments

 

             ALT (test code = ALT) 43           See_Comment                [Auto

mated message] The



                                                                 system which ge

nerated this



                                                                 result transmit

huma



                                                                 reference range

: <=65. The



                                                                 reference range

 was not



                                                                 used to interpr

et this



                                                                 result as zayda

l/abnormal.



[facility]2018-05-07 09:36:00





             Test Item    Value        Reference Range Interpretation Comments

 

             Total Protein (test code = Total 7.1          6.4-8.4              

     



             Protein)                                            



[facility]2018-05-07 09:36:00





             Test Item    Value        Reference Range Interpretation Comments

 

             Albumin Lvl (test code = Albumin Lvl) 2.7          3.5-5.0         

          



[facility]2018-05-07 09:36:00





             Test Item    Value        Reference Range Interpretation Comments

 

             Calcium Lvl (test code = Calcium Lvl) 9.2          8.5-10.5        

          



[facility]2018-05-07 09:36:00





             Test Item    Value        Reference Range Interpretation Comments

 

             CO2 (test code = CO2) 21           24-32                     



[facility]2018-05-07 09:36:00





             Test Item    Value        Reference Range Interpretation Comments

 

             Potassium Lvl (test code = Potassium 4.3          3.5-5.1          

         



             Lvl)                                                



[facility]2018-05-07 09:36:00





             Test Item    Value        Reference Range Interpretation Comments

 

             Chloride Lvl (test code = Chloride Lvl) 114                  

            



[facility]ptxowXJOFRYYYJI5166[facility]





             Test Item    Value        Reference Range Interpretation Comments

 

             MCHC (test code = MCHC) 32.5         32.0-36.0                 



[facility] DallasQektoyzUTNSFWAZUO7015-16-74 09:36:00





             Test Item    Value        Reference Range Interpretation Comments

 

             RDW (test code = RDW) 17.5         11.5-14.5                 



[facility] DallasEtwwzjuZRACYYMQMR5320-07-65 09:36:00





             Test Item    Value        Reference Range Interpretation Comments

 

             Platelet (test code = Platelet) 400          133-450               

    



[facility] DallasIrlegxuNDLYHZOWXC7860-89-38 09:36:00





             Test Item    Value        Reference Range Interpretation Comments

 

             MPV (test code = MPV) 8.5          7.4-10.4                  



[facility] DallasUcaulgiOUFZNXVXXR7043-53-06 09:36:00





             Test Item    Value        Reference Range Interpretation Comments

 

             WBC (test code = WBC) 6.6          3.7-10.4                  



[facility] DallasOztornsSGDOFBIGUT8431-24-21 09:36:00





             Test Item    Value        Reference Range Interpretation Comments

 

             RBC (test code = RBC) 5.17         4.70-6.10                 



[facility] DallasWnbvwynDDAZAFXVPL0331-77-78 09:36:00





             Test Item    Value        Reference Range Interpretation Comments

 

             MCV (test code = MCV) 86.1         80.0-94.0                 



[facility] DallasAdplsttRCRSXFQKOB8683-39-44 09:36:00





             Test Item    Value        Reference Range Interpretation Comments

 

             Hct (test code = Hct) 44.5         42.0-54.0                 



[facility] DallasMhirromOSNYATWXDL4438-62-42 09:36:00





             Test Item    Value        Reference Range Interpretation Comments

 

             MCH (test code = MCH) 28.0 pg      27.0-31.0                 



[facility] DallasSidxpwbWNHLBOSAMK6407-08-37 09:36:00





             Test Item    Value        Reference Range Interpretation Comments

 

             Hgb (test code = Hgb) 14.5         14.0-18.0                 



[facility] DallasEerwhfvMPLMNCOXRB7184-17-44 09:36:00





             Test Item    Value        Reference Range Interpretation Comments

 

             Lymphocytes # (test code = Lymphocytes 1.7          1.0-5.5        

           



             #)                                                  



[facility] DallasAtoonxzPWNJYCLCBG2537-64-26 09:36:00





             Test Item    Value        Reference Range Interpretation Comments

 

             Monocytes # (test code 0.7          See_Comment                [Aut

omated message] The



             = Monocytes #)                                        system which 

generated



                                                                 this result tra

nsmitted



                                                                 reference range

: <=0.8.



                                                                 The reference r

zhen was



                                                                 not used to int

erpret



                                                                 this result as



                                                                 normal/abnormal

.



[facility] DallasFipcffdTYRGFUTFAO4955-80-58 09:36:00





             Test Item    Value        Reference Range Interpretation Comments

 

             Eosinophils # (test code 0.2          See_Comment                [A

utomated message] The



             = Eosinophils #)                                        system whic

h generated



                                                                 this result tra

nsmitted



                                                                 reference range

: <=0.5.



                                                                 The reference r

zhen was



                                                                 not used to int

erpret



                                                                 this result as



                                                                 normal/abnormal

.



[facility] DallasEtgzgjdERDXDOEZGB1722-36-90 09:36:00





             Test Item    Value        Reference Range Interpretation Comments

 

             Lymphocytes (test code = Lymphocytes) 26.1         20.0-40.0       

          



[facility] DallasPyzniqjABNIGPPRSG9914-07-84 09:36:00





             Test Item    Value        Reference Range Interpretation Comments

 

             Segs (test code = Segs) 59.3         45.0-75.0                 



[facility] DallasBuhzggkGKVBXPOBBP1791-03-93 09:36:00





             Test Item    Value        Reference Range Interpretation Comments

 

             Basophils (test code = 0.8          See_Comment                [Aut

omated message] The



             Basophils)                                          system which ge

nerated



                                                                 this result tra

nsmitted



                                                                 reference range

: <=1.0.



                                                                 The reference r

zhen was



                                                                 not used to int

erpret



                                                                 this result as



                                                                 normal/abnormal

.



[facility] DallasXwellzrZUAZOAXINZ8765-26-01 09:36:00





             Test Item    Value        Reference Range Interpretation Comments

 

             Monocytes (test code = Monocytes) 10.5         2.0-12.0            

      



[facility] DallasLhnwsotFVKJVSRNBH7446-79-00 09:36:00





             Test Item    Value        Reference Range Interpretation Comments

 

             Eosinophils (test code = 3.3          See_Comment                [A

utomated message] The



             Eosinophils)                                        system which ge

nerated



                                                                 this result tra

nsmitted



                                                                 reference range

: <=4.0.



                                                                 The reference r

zhen was



                                                                 not used to int

erpret



                                                                 this result as



                                                                 normal/abnormal

.



[facility]





             Test Item    Value        Reference Range Interpretation Comments

 

             Segs-Bands # (test code = Segs-Bands #) 3.9          1.5-8.1       

            



[facility] HospitalMirametrix [id number] 09:36:00





             Test Item    Value        Reference Range Interpretation Comments

 

             Globulin (test code = Globulin) 4.4          2.7-4.2               

    



[facility] IRHSK6947[facility]





             Test Item    Value        Reference Range Interpretation Comments

 

             A/G Ratio (test code = A/G Ratio) 0.6 1        0.7-1.6             

      



[place]1878-05-07 09:36:00





             Test Item    Value        Reference Range Interpretation Comments

 

             B/C Ratio (test code = B/C Ratio) 17 1         6-25                

      



[place] [address]-05-07 09:36:00





             Test Item    Value        Reference Range Interpretation Comments

 

             AGAP (test code = AGAP) 11.3         10.0-20.0                 



[place]1878-05-07 09:36:00





             Test Item    Value        Reference Range Interpretation Comments

 

             eGFR (test code = eGFR) 134                                    



[place]1878-05-07 09:36:00





             Test Item    Value        Reference Range Interpretation Comments

 

             Creatinine Lvl (test code = Creatinine 0.84         0.50-1.40      

           



             Lvl)                                                



[place]1878-05-07 09:36:00





             Test Item    Value        Reference Range Interpretation Comments

 

             Sodium Lvl (test code = Sodium Lvl) 142          135-145           

        



[place]1878-05-07 09:36:00





             Test Item    Value        Reference Range Interpretation Comments

 

             Glucose Lvl (test code = Glucose Lvl) 99           70-99           

          



[place]1878-05-07 09:36:00





             Test Item    Value        Reference Range Interpretation Comments

 

             BUN (test code = BUN) 14           7-22                      



[place]1878-05-07 09:36:00





             Test Item    Value        Reference Range Interpretation Comments

 

             Alk Phos (test code = Alk Phos) 79                           

    



[place]1878-05-07 09:36:00





             Test Item    Value        Reference Range Interpretation Comments

 

             Bili Total (test code = Bili Total) 0.3          0.2-1.3           

        



[place]1878-05-07 09:36:00





             Test Item    Value        Reference Range Interpretation Comments

 

             AST (test code = AST) 14           See_Comment                [Auto

mated message] The



                                                                 system which ge

nerated this



                                                                 result transmit

huma



                                                                 reference range

: <=37. The



                                                                 reference range

 was not



                                                                 used to interpr

et this



                                                                 result as zayda

l/abnormal.



[place] 691878-05-07 09:36:00





             Test Item    Value        Reference Range Interpretation Comments

 

             ALT (test code = ALT) 43           See_Comment                [Auto

mated message] The



                                                                 system which ge

nerated this



                                                                 result transmit

huma



                                                                 reference range

: <=65. The



                                                                 reference range

 was not



                                                                 used to interpr

et this



                                                                 result as zayda

l/abnormal.



[facility]2018-05-07 09:36:00





             Test Item    Value        Reference Range Interpretation Comments

 

             Total Protein (test code = Total 7.1          6.4-8.4              

     



             Protein)                                            



[place] 691878-05-07 09:36:00





             Test Item    Value        Reference Range Interpretation Comments

 

             Albumin Lvl (test code = Albumin Lvl) 2.7          3.5-5.0         

          



[facility]2018-05-07 09:36:00





             Test Item    Value        Reference Range Interpretation Comments

 

             Calcium Lvl (test code = Calcium Lvl) 9.2          8.5-10.5        

          



[facility]2018-05-07 09:36:00





             Test Item    Value        Reference Range Interpretation Comments

 

             CO2 (test code = CO2) 21           24-32                     



[facility]2018-05-07 09:36:00





             Test Item    Value        Reference Range Interpretation Comments

 

             Potassium Lvl (test code = Potassium 4.3          3.5-5.1          

         



             Lvl)                                                



[facility]2018-05-07 09:36:00





             Test Item    Value        Reference Range Interpretation Comments

 

             Chloride Lvl (test code = Chloride Lvl) 114                  

            



[facility] DallasJxvxdhfZVVDMSAGFK9089-03-53 09:36:00





             Test Item    Value        Reference Range Interpretation Comments

 

             MCHC (test code = MCHC) 32.5         32.0-36.0                 



[facility] DallasPvjgbsgSMZDSKMUUE2685-98-80 09:36:00





             Test Item    Value        Reference Range Interpretation Comments

 

             RDW (test code = RDW) 17.5         11.5-14.5                 



[facility] DallasVdsiydiHGAAQOKYSL9929-98-26 09:36:00





             Test Item    Value        Reference Range Interpretation Comments

 

             Platelet (test code = Platelet) 400          133-450               

    



[facility] DallasNbpyrdjFRHQRMCSCI1473-33-85 09:36:00





             Test Item    Value        Reference Range Interpretation Comments

 

             MPV (test code = MPV) 8.5          7.4-10.4                  



[facility] DallasGvywughUYBAAHWKIH7992-44-40 09:36:00





             Test Item    Value        Reference Range Interpretation Comments

 

             WBC (test code = WBC) 6.6          3.7-10.4                  



[facility] DallasHgehljcWJYMDFNPNG6729-24-10 09:36:00





             Test Item    Value        Reference Range Interpretation Comments

 

             RBC (test code = RBC) 5.17         4.70-6.10                 



[facility] DallasTxaoevhJCWABPUTAZ2674-80-65 09:36:00





             Test Item    Value        Reference Range Interpretation Comments

 

             MCV (test code = MCV) 86.1         80.0-94.0                 



[facility] DallasBidwfowWBBTXQPAME4471-57-97 09:36:00





             Test Item    Value        Reference Range Interpretation Comments

 

             Hct (test code = Hct) 44.5         42.0-54.0                 



[facility] DallasXafmkdhHFENKBVXCK1669-69-88 09:36:00





             Test Item    Value        Reference Range Interpretation Comments

 

             MCH (test code = MCH) 28.0 pg      27.0-31.0                 



[facility] DallasKzfjkexQMNSQNBJXX3733-88-98 09:36:00





             Test Item    Value        Reference Range Interpretation Comments

 

             Hgb (test code = Hgb) 14.5         14.0-18.0                 



[facility] DallasKzetweaSUZFEAZGME7654-59-71 09:36:00





             Test Item    Value        Reference Range Interpretation Comments

 

             Lymphocytes # (test code = Lymphocytes 1.7          1.0-5.5        

           



             #)                                                  



[facility] DallasDeqwjwwPMSRWTHFAC4389-82-11 09:36:00





             Test Item    Value        Reference Range Interpretation Comments

 

             Monocytes # (test code 0.7          See_Comment                [Aut

omated message] The



             = Monocytes #)                                        system which 

generated



                                                                 this result tra

nsmitted



                                                                 reference range

: <=0.8.



                                                                 The reference r

zhen was



                                                                 not used to int

erpret



                                                                 this result as



                                                                 normal/abnormal

.



[facility] DallasPijbjgjIPLHWSMRJZ8793-11-51 09:36:00





             Test Item    Value        Reference Range Interpretation Comments

 

             Eosinophils # (test code 0.2          See_Comment                [A

utomated message] The



             = Eosinophils #)                                        system whic

h generated



                                                                 this result tra

nsmitted



                                                                 reference range

: <=0.5.



                                                                 The reference r

zhen was



                                                                 not used to int

erpret



                                                                 this result as



                                                                 normal/abnormal

.



[facility] DallasOvkskitJLZBYUWMDD4257-79-20 09:36:00





             Test Item    Value        Reference Range Interpretation Comments

 

             Lymphocytes (test code = Lymphocytes) 26.1         20.0-40.0       

          



[facility] DallasLeclgamOYYPRBCRJX5232-30-87 09:36:00





             Test Item    Value        Reference Range Interpretation Comments

 

             Segs (test code = Segs) 59.3         45.0-75.0                 



[facility] DallasVjnsdlnXOGDQHNVNV4298-30-49 09:36:00





             Test Item    Value        Reference Range Interpretation Comments

 

             Basophils (test code = 0.8          See_Comment                [Aut

omated message] The



             Basophils)                                          system which ge

nerated



                                                                 this result tra

nsmitted



                                                                 reference range

: <=1.0.



                                                                 The reference r

zhen was



                                                                 not used to int

erpret



                                                                 this result as



                                                                 normal/abnormal

.



[facility] DallasDlbrdbzLAHPHDRQAZ1474-45-33 09:36:00





             Test Item    Value        Reference Range Interpretation Comments

 

             Monocytes (test code = Monocytes) 10.5         2.0-12.0            

      



[facility] DallasNuynozcGUELGBLWFA2222-31-12 09:36:00





             Test Item    Value        Reference Range Interpretation Comments

 

             Eosinophils (test code = 3.3          See_Comment                [A

utomated message] The



             Eosinophils)                                        system which ge

nerated



                                                                 this result tra

nsmitted



                                                                 reference range

: <=4.0.



                                                                 The reference r

zhen was



                                                                 not used to int

erpret



                                                                 this result as



                                                                 normal/abnormal

.



[facility] DallasPkxnliwANAADDHFSU1297-47-26 09:36:00





             Test Item    Value        Reference Range Interpretation Comments

 

             Segs-Bands # (test code = Segs-Bands #) 3.9          1.5-8.1       

            



[place] [address]2018-05-06 21:02:00





             Test Item    Value        Reference Range Interpretation Comments

 

             Vanco Tr TND (test code = Vanco Tr 15:30pm                         

       



             TND)                                                



[facility] HospitalRhbgrsiZPMJSJTQWM5069-17-56 21:02:00





             Test Item    Value        Reference Range Interpretation Comments

 

             Vanco Tr (test code = Vanco Tr) 9.1                                

    



[facility]bp[facility]ouzFLFCLFITAV5450[facility]25-12 21:02:00





             Test Item    Value        Reference Range Interpretation Comments

 

             Vanco Tr TND (test code = Vanco Tr 15:30pm                         

       



             TND)                                                



[facility]SbfgcgyYFBMFXQQRB9384-54-30 21:02:00





             Test Item    Value        Reference Range Interpretation Comments

 

             Vanco Tr (test code = Vanco Tr) 9.1                                

    



[facility]bidisTSXEUVVSQY8076[facility]23-50 21:02:00





             Test Item    Value        Reference Range Interpretation Comments

 

             Vanco Tr TND (test code = Vanco Tr 15:30pm                         

       



             TND)                                                



[facility] HospitalPfieydsDIJCOIADQZ5616-30-61 21:02:00





             Test Item    Value        Reference Range Interpretation Comments

 

             Vanco Tr (test code = Vanco Tr) 9.1                                

    



[facility] HospitalYaaqezvFRBXSLLRDB2126-21-47 21:02:00





             Test Item    Value        Reference Range Interpretation Comments

 

             Vanco Tr TND (test code = Vanco Tr 15:30pm                         

       



             TND)                                                



[facility] HospitalLxfmbzzUPGTKQFXRG9017-46-80 21:02:00





             Test Item    Value        Reference Range Interpretation Comments

 

             Vanco Tr (test code = Vanco Tr) 9.1                                

    



[facility] HospitalYegciwqWQZKSRDUIF8853-75-92 21:02:00





             Test Item    Value        Reference Range Interpretation Comments

 

             Vanco Tr TND (test code = Vanco Tr 15:30pm                         

       



             TND)                                                



[facility] HospitalUoqwtyuUCKSCIPVPA1833-00-04 21:02:00





             Test Item    Value        Reference Range Interpretation Comments

 

             Vanco Tr (test code = Vanco Tr) 9.1                                

    



[facility] HospitalNoawxkxTWAHBUPXCH7231-64-55 21:02:00





             Test Item    Value        Reference Range Interpretation Comments

 

             Vanco Tr TND (test code = Vanco Tr 15:30pm                         

       



             TND)                                                



[facility] HospitalZqgjellYYMQISFRRV6192-31-69 21:02:00





             Test Item    Value        Reference Range Interpretation Comments

 

             Vanco Tr (test code = Vanco Tr) 9.1                                

    



[facility] HospitalPrlzrvaOQJPKVTWFD1508-50-50 21:02:00





             Test Item    Value        Reference Range Interpretation Comments

 

             Vanco Tr TND (test code = Vanco Tr 15:30pm                         

       



             TND)                                                



[facility] HospitalDwrowkqIJCYMUJQYX2518-77-36 21:02:00





             Test Item    Value        Reference Range Interpretation Comments

 

             Vanco Tr (test code = Vanco Tr) 9.1                                

    



[facility] [employer] [id number] 07:07:00





             Test Item    Value        Reference Range Interpretation Comments

 

             Glucose Lvl (test code = Glucose Lvl) 74           70-99           

          



[facility] [id number] 07:07:00





             Test Item    Value        Reference Range Interpretation Comments

 

             BUN (test code = BUN) 12           7-22                      



[facility] [id number] 07:07:00





             Test Item    Value        Reference Range Interpretation Comments

 

             Creatinine Lvl (test code = Creatinine 0.75         0.50-1.40      

           



             Lvl)                                                



[facility] FBINF6631-02-11 07:07:00





             Test Item    Value        Reference Range Interpretation Comments

 

             eGFR (test code = eGFR) 140                                    



[facility] OZMQH5421-22-16 07:07:00





             Test Item    Value        Reference Range Interpretation Comments

 

             Albumin Lvl (test code = Albumin Lvl) 2.9          3.5-5.0         

          



[place]1878-05-06 07:07:00





             Test Item    Value        Reference Range Interpretation Comments

 

             Globulin (test code = Globulin) 4.4          2.7-4.2               

    



[place] 691878-05-06 07:07:00





             Test Item    Value        Reference Range Interpretation Comments

 

             A/G Ratio (test code = A/G Ratio) 0.7 1        0.7-1.6             

      



[place] 691878-05-06 07:07:00





             Test Item    Value        Reference Range Interpretation Comments

 

             Bili Total (test code = Bili Total) 0.4          0.2-1.3           

        



[place] [address]-05-06 07:07:00





             Test Item    Value        Reference Range Interpretation Comments

 

             Alk Phos (test code = Alk Phos) 71                           

    



[place] 691878-05-06 07:07:00





             Test Item    Value        Reference Range Interpretation Comments

 

             AST (test code = AST) 15           See_Comment                [Auto

mated message] The



                                                                 system which ge

nerated this



                                                                 result transmit

huma



                                                                 reference range

: <=37. The



                                                                 reference range

 was not



                                                                 used to interpr

et this



                                                                 result as zayda

l/abnormal.



[place]1878-05-06 07:07:00





             Test Item    Value        Reference Range Interpretation Comments

 

             ALT (test code = ALT) 28           See_Comment                [Auto

mated message] The



                                                                 system which ge

nerated this



                                                                 result transmit

huma



                                                                 reference range

: <=65. The



                                                                 reference range

 was not



                                                                 used to interpr

et this



                                                                 result as zadya

l/abnormal.



[facility]2018-05-06 07:07:00





             Test Item    Value        Reference Range Interpretation Comments

 

             Potassium Lvl (test code = Potassium 4.4          3.5-5.1          

         



             Lvl)                                                



[facility]2018-05-06 07:07:00





             Test Item    Value        Reference Range Interpretation Comments

 

             Sodium Lvl (test code = Sodium Lvl) 147          135-145           

        



[place] 691878-05-06 07:07:00





             Test Item    Value        Reference Range Interpretation Comments

 

             CO2 (test code = CO2) 25           24-32                     



[place] 691878-05-06 07:07:00





             Test Item    Value        Reference Range Interpretation Comments

 

             Chloride Lvl (test code = Chloride Lvl) 114                  

            



[place] 691878-05-06 07:07:00





             Test Item    Value        Reference Range Interpretation Comments

 

             B/C Ratio (test code = B/C Ratio) 16 1         6-25                

      



[facility]2018-05-06 07:07:00





             Test Item    Value        Reference Range Interpretation Comments

 

             Calcium Lvl (test code = Calcium Lvl) 8.7          8.5-10.5        

          



[facility]2018-05-06 07:07:00





             Test Item    Value        Reference Range Interpretation Comments

 

             AGAP (test code = AGAP) 12.4         10.0-20.0                 



[facility]2018-05-06 07:07:00





             Test Item    Value        Reference Range Interpretation Comments

 

             Total Protein (test code = Total 7.3          6.4-8.4              

     



             Protein)                                            



[facility] DallasHsewnbnYRKQHRSFSZ8079-90-49 07:07:00





             Test Item    Value        Reference Range Interpretation Comments

 

             Platelet (test code = Platelet) 345          133-450               

    



[facility] DallasJtmcxwePMIPINHPPM6007-87-48 07:07:00





             Test Item    Value        Reference Range Interpretation Comments

 

             MPV (test code = MPV) 8.7          7.4-10.4                  



[facility] DallasZxmjfpjORJZKXPDLC1439-65-24 07:07:00





             Test Item    Value        Reference Range Interpretation Comments

 

             WBC (test code = WBC) 6.9          3.7-10.4                  



[facility] DallasEpprasaAZCFQBFLGC9369-98-86 07:07:00





             Test Item    Value        Reference Range Interpretation Comments

 

             RBC (test code = RBC) 5.30         4.70-6.10                 



[facility] DallasRkfffpgKSIBTYPCEX3904-48-47 07:07:00





             Test Item    Value        Reference Range Interpretation Comments

 

             MCHC (test code = MCHC) 32.8         32.0-36.0                 



[facility] DallasDbdfkckCFRQCBTQGB7227-71-36 07:07:00





             Test Item    Value        Reference Range Interpretation Comments

 

             MCH (test code = MCH) 27.9 pg      27.0-31.0                 



[facility] DallasYwquabiCAIDESHQMH8216-11-25 07:07:00





             Test Item    Value        Reference Range Interpretation Comments

 

             Hgb (test code = Hgb) 14.8         14.0-18.0                 



[facility] DallasMqbdzlgDIPPSIBMVA5250-35-82 07:07:00





             Test Item    Value        Reference Range Interpretation Comments

 

             MCV (test code = MCV) 85.0         80.0-94.0                 



[place] 489578-05-06 07:07:00





             Test Item    Value        Reference Range Interpretation Comments

 

             Hct (test code = Hct) 45.1         42.0-54.0                 



[facility] DallasXsmalwxPXEDLBIAIN5992-66-84 07:07:00





             Test Item    Value        Reference Range Interpretation Comments

 

             RDW (test code = RDW) 17.5         11.5-14.5                 



[facility] DallasDjuvdmgKPZZLCSUDM4509-30-62 07:07:00





             Test Item    Value        Reference Range Interpretation Comments

 

             Eosinophils # (test code 0.2          See_Comment                [A

utomated message] The



             = Eosinophils #)                                        system [facility] generated



                                                                 this result tra

nsmitted



                                                                 reference range

: <=0.5.



                                                                 The reference r

zhen was



                                                                 not used to int

erpret



                                                                 this result as



                                                                 normal/abnormal

.



[facility] DallasByrwhioEENFIIAXEB2768-01-11 07:07:00





             Test Item    Value        Reference Range Interpretation Comments

 

             Lymphocytes # (test code = Lymphocytes 2.0          1.0-5.5        

           



             #)                                                  



[facility] DallasJdhuqhxHBOHFGZXJS0678-18-97 07:07:00





             Test Item    Value        Reference Range Interpretation Comments

 

             Monocytes # (test code 0.7          See_Comment                [Aut

omated message] The



             = Monocytes #)                                        system which 

generated



                                                                 this result tra

nsmitted



                                                                 reference range

: <=0.8.



                                                                 The reference r

zhen was



                                                                 not used to int

erpret



                                                                 this result as



                                                                 normal/abnormal

.



[facility] DallasRmjcpdmNHIFNKSGYS8652-40-10 07:07:00





             Test Item    Value        Reference Range Interpretation Comments

 

             Eosinophils (test code = 2.4          See_Comment                [A

utomated message] The



             Eosinophils)                                        system which ge

nerated



                                                                 this result tra

nsmitted



                                                                 reference range

: <=4.0.



                                                                 The reference r

zhen was



                                                                 not used to int

erpret



                                                                 this result as



                                                                 normal/abnormal

.



[facility] DallasPzzumeqBFJWFDQIBW8518-29-65 07:07:00





             Test Item    Value        Reference Range Interpretation Comments

 

             Basophils (test code = 0.6          See_Comment                [Aut

omated message] The



             Basophils)                                          system which ge

nerated



                                                                 this result tra

nsmitted



                                                                 reference range

: <=1.0.



                                                                 The reference r

zhen was



                                                                 not used to int

erpret



                                                                 this result as



                                                                 normal/abnormal

.



[facility] DallasKmbzfqiGECXTCRULR2643-32-98 07:07:00





             Test Item    Value        Reference Range Interpretation Comments

 

             Segs-Bands # (test code = Segs-Bands #) 4.0          1.5-8.1       

            



[facility] DallasYbwhcjaXPRLVUCXJC5358-61-84 07:07:00





             Test Item    Value        Reference Range Interpretation Comments

 

             Monocytes (test code = Monocytes) 10.4         2.0-12.0            

      



[facility] DallasYkmbjcmEYBYAYMYDV7147-94-02 07:07:00





             Test Item    Value        Reference Range Interpretation Comments

 

             RBC Morph (test code = Normal (18 2:07 AM)                     

      



             RBC Morph)                                          



[facility] DallasGuesepcJSFRWLPOQW7696-08-92 07:07:00





             Test Item    Value        Reference Range Interpretation Comments

 

             Segs (test code = Segs) 58.1         45.0-75.0                 



[facility] DallasXwzpsyqCFXNQAOEEQ1631-43-17 07:07:00





             Test Item    Value        Reference Range Interpretation Comments

 

             Plt Morph (test code = Normal (18 2:07 AM)                     

      



             Plt Morph)                                          



[facility] DallasPmgwploXHXRCCPPTA4501-14-24 07:07:00





             Test Item    Value        Reference Range Interpretation Comments

 

             Lymphocytes (test code = Lymphocytes) 28.5         20.0-40.0       

          



[facility]2018-05-06 07:07:00





             Test Item    Value        Reference Range Interpretation Comments

 

             Glucose Lvl (test code = Glucose Lvl) 74           70-99           

          



[facility]2018-05-06 07:07:00





             Test Item    Value        Reference Range Interpretation Comments

 

             BUN (test code = BUN) 12           7-22                      



[facility]2018-05-06 07:07:00





             Test Item    Value        Reference Range Interpretation Comments

 

             Creatinine Lvl (test code = Creatinine 0.75         0.50-1.40      

           



             Lvl)                                                



[facility]2018-05-06 07:07:00





             Test Item    Value        Reference Range Interpretation Comments

 

             eGFR (test code = eGFR) 140                                    



[facility]2018-05-06 07:07:00





             Test Item    Value        Reference Range Interpretation Comments

 

             Albumin Lvl (test code = Albumin Lvl) 2.9          3.5-5.0         

          



[facility]2018-05-06 07:07:00





             Test Item    Value        Reference Range Interpretation Comments

 

             Globulin (test code = Globulin) 4.4          2.7-4.2               

    



[place]1878-05-06 07:07:00





             Test Item    Value        Reference Range Interpretation Comments

 

             A/G Ratio (test code = A/G Ratio) 0.7 1        0.7-1.6             

      



[facility]2018-05-06 07:07:00





             Test Item    Value        Reference Range Interpretation Comments

 

             Bili Total (test code = Bili Total) 0.4          0.2-1.3           

        



[facility]2018-05-06 07:07:00





             Test Item    Value        Reference Range Interpretation Comments

 

             Alk Phos (test code = Alk Phos) 71                           

    



[facility]2018-05-06 07:07:00





             Test Item    Value        Reference Range Interpretation Comments

 

             AST (test code = AST) 15           See_Comment                [Auto

mated message] The



                                                                 system which ge

nerated this



                                                                 result transmit

huma



                                                                 reference range

: <=37. The



                                                                 reference range

 was not



                                                                 used to interpr

et this



                                                                 result as zayda

l/abnormal.



[place]1878-05-06 07:07:00





             Test Item    Value        Reference Range Interpretation Comments

 

             ALT (test code = ALT) 28           See_Comment                [Auto

mated message] The



                                                                 system which ge

nerated this



                                                                 result transmit

huma



                                                                 reference range

: <=65. The



                                                                 reference range

 was not



                                                                 used to interpr

et this



                                                                 result as zayda

l/abnormal.



[place]1878-05-06 07:07:00





             Test Item    Value        Reference Range Interpretation Comments

 

             Potassium Lvl (test code = Potassium 4.4          3.5-5.1          

         



             Lvl)                                                



[place]1878-05-06 07:07:00





             Test Item    Value        Reference Range Interpretation Comments

 

             Sodium Lvl (test code = Sodium Lvl) 147          135-145           

        



[place]1878-05-06 07:07:00





             Test Item    Value        Reference Range Interpretation Comments

 

             CO2 (test code = CO2) 25           24-32                     



[facility]2018-05-06 07:07:00





             Test Item    Value        Reference Range Interpretation Comments

 

             Chloride Lvl (test code = Chloride Lvl) 114                  

            



[facility]2018-05-06 07:07:00





             Test Item    Value        Reference Range Interpretation Comments

 

             B/C Ratio (test code = B/C Ratio) 16 1         6-25                

      



[place]1878-05-06 07:07:00





             Test Item    Value        Reference Range Interpretation Comments

 

             Calcium Lvl (test code = Calcium Lvl) 8.7          8.5-10.5        

          



[place]1878-05-06 07:07:00





             Test Item    Value        Reference Range Interpretation Comments

 

             AGAP (test code = AGAP) 12.4         10.0-20.0                 



[place]1878-05-06 07:07:00





             Test Item    Value        Reference Range Interpretation Comments

 

             Total Protein (test code = Total 7.3          6.4-8.4              

     



             Protein)                                            



[facility] DallasWycndqaAJTSUXNHJT0291-04-81 07:07:00





             Test Item    Value        Reference Range Interpretation Comments

 

             Platelet (test code = Platelet) 345          133-450               

    



[facility] DallasPsldlwgTVNYOCSWZZ0975-39-34 07:07:00





             Test Item    Value        Reference Range Interpretation Comments

 

             MPV (test code = MPV) 8.7          7.4-10.4                  



[facility] DallasMpbduetPRIRQABPOC4299-78-91 07:07:00





             Test Item    Value        Reference Range Interpretation Comments

 

             WBC (test code = WBC) 6.9          3.7-10.4                  



[place] [address]-05-06 07:07:00





             Test Item    Value        Reference Range Interpretation Comments

 

             RBC (test code = RBC) 5.30         4.70-6.10                 



[facility] DallasMurvxvwYTOVXJNHSX7787-08-49 07:07:00





             Test Item    Value        Reference Range Interpretation Comments

 

             MCHC (test code = MCHC) 32.8         32.0-36.0                 



[facility] DallasUdgzfztSGMEDFJCTZ7267-91-31 07:07:00





             Test Item    Value        Reference Range Interpretation Comments

 

             MCH (test code = MCH) 27.9 pg      27.0-31.0                 



[facility] DallasOjoxwegCROXETGDYO4953-24-24 07:07:00





             Test Item    Value        Reference Range Interpretation Comments

 

             Hgb (test code = Hgb) 14.8         14.0-18.0                 



[facility] [place]zdBSPAEGWARI9928-68-74 07:07:00





             Test Item    Value        Reference Range Interpretation Comments

 

             MCV (test code = MCV) 85.0         80.0-94.0                 



[facility]cg[facility]ATRENT[facility]EM6744[facility]50[facility]





             Test Item    Value        Reference Range Interpretation Comments

 

             Hct (test code = Hct) 45.1         42.0-54.0                 



[facility] DallasRmrjiayFFEVYWYDRU7380-15-16 07:07:00





             Test Item    Value        Reference Range Interpretation Comments

 

             RDW (test code = RDW) 17.5         11.5-14.5                 



[facility] DallasOnqdosbCIDGHQFQXG9931-54-91 07:07:00





             Test Item    Value        Reference Range Interpretation Comments

 

             Eosinophils # (test code 0.2          See_Comment                [A

utomated message] The



             = Eosinophils #)                                        system whic

h generated



                                                                 this result tra

nsmitted



                                                                 reference range

: <=0.5.



                                                                 The reference r

zhen was



                                                                 not used to int

erpret



                                                                 this result as



                                                                 normal/abnormal

.



[facility] DallasLspvygjQGJWDAGYUT6424-48-01 07:07:00





             Test Item    Value        Reference Range Interpretation Comments

 

             Lymphocytes # (test code = Lymphocytes 2.0          1.0-5.5        

           



             #)                                                  



[facility] DallasNvwthqtONBXFHMZTN1361-34-56 07:07:00





             Test Item    Value        Reference Range Interpretation Comments

 

             Monocytes # (test code 0.7          See_Comment                [Aut

omated message] The



             = Monocytes #)                                        system which 

generated



                                                                 this result tra

nsmitted



                                                                 reference range

: <=0.8.



                                                                 The reference r

zhen was



                                                                 not used to int

erpret



                                                                 this result as



                                                                 normal/abnormal

.



[facility] DallasWfiqdjrMANTWWKFFB3852-93-84 07:07:00





             Test Item    Value        Reference Range Interpretation Comments

 

             Eosinophils (test code = 2.4          See_Comment                [A

utomated message] The



             Eosinophils)                                        system which ge

nerated



                                                                 this result tra

nsmitted



                                                                 reference range

: <=4.0.



                                                                 The reference r

zhen was



                                                                 not used to int

erpret



                                                                 this result as



                                                                 normal/abnormal

.



[facility] DallasUkxbqvvGSLTEAPVBU0426-58-86 07:07:00





             Test Item    Value        Reference Range Interpretation Comments

 

             Basophils (test code = 0.6          See_Comment                [Aut

omated message] The



             Basophils)                                          system which ge

nerated



                                                                 this result tra

nsmitted



                                                                 reference range

: <=1.0.



                                                                 The reference r

zhen was



                                                                 not used to int

erpret



                                                                 this result as



                                                                 normal/abnormal

.



[facility] DallasMhbmximFSGJMIJURN1702-14-35 07:07:00





             Test Item    Value        Reference Range Interpretation Comments

 

             Segs-Bands # (test code = Segs-Bands #) 4.0          1.5-8.1       

            



[facility] DallasJbfmfpdGFGLUHFMMP1511-20-45 07:07:00





             Test Item    Value        Reference Range Interpretation Comments

 

             Monocytes (test code = Monocytes) 10.4         2.0-12.0            

      



[facility] DallasCrxvrbdMJSKDULCDL2902-43-77 07:07:00





             Test Item    Value        Reference Range Interpretation Comments

 

             RBC Morph (test code = Normal (18 2:07 AM)                     

      



             RBC Morph)                                          



[facility] DallasNbxdlxhEOBQJWPJGZ9409-77-21 07:07:00





             Test Item    Value        Reference Range Interpretation Comments

 

             Segs (test code = Segs) 58.1         45.0-75.0                 



[facility] DallasWvnjtpwWIIHLBBLCK4267-41-86 07:07:00





             Test Item    Value        Reference Range Interpretation Comments

 

             Plt Morph (test code = Normal (18 2:07 AM)                     

      



             Plt Morph)                                          



[facility] DallasBkzxgtpTEXEHXAWKN2849-68-44 07:07:00





             Test Item    Value        Reference Range Interpretation Comments

 

             Lymphocytes (test code = Lymphocytes) 28.5         20.0-40.0       

          



[facility]2018-05-06 07:07:00





             Test Item    Value        Reference Range Interpretation Comments

 

             Glucose Lvl (test code = Glucose Lvl) 74           70-99           

          



[facility]2018-05-06 07:07:00





             Test Item    Value        Reference Range Interpretation Comments

 

             BUN (test code = BUN) 12           7-22                      



[place]1878-05-06 07:07:00





             Test Item    Value        Reference Range Interpretation Comments

 

             Creatinine Lvl (test code = Creatinine 0.75         0.50-1.40      

           



             Lvl)                                                



[facility]2018-05-06 07:07:00





             Test Item    Value        Reference Range Interpretation Comments

 

             eGFR (test code = eGFR) 140                                    



[facility]2018-05-06 07:07:00





             Test Item    Value        Reference Range Interpretation Comments

 

             Albumin Lvl (test code = Albumin Lvl) 2.9          3.5-5.0         

          



[facility]2018-05-06 07:07:00





             Test Item    Value        Reference Range Interpretation Comments

 

             Globulin (test code = Globulin) 4.4          2.7-4.2               

    



[place]1878-05-06 07:07:00





             Test Item    Value        Reference Range Interpretation Comments

 

             A/G Ratio (test code = A/G Ratio) 0.7 1        0.7-1.6             

      



[place]1878-05-06 07:07:00





             Test Item    Value        Reference Range Interpretation Comments

 

             Bili Total (test code = Bili Total) 0.4          0.2-1.3           

        



[facility]2018-05-06 07:07:00





             Test Item    Value        Reference Range Interpretation Comments

 

             Alk Phos (test code = Alk Phos) 71                           

    



[facility]2018-05-06 07:07:00





             Test Item    Value        Reference Range Interpretation Comments

 

             AST (test code = AST) 15           See_Comment                [Auto

mated message] The



                                                                 system which ge

nerated this



                                                                 result transmit

huma



                                                                 reference range

: <=37. The



                                                                 reference range

 was not



                                                                 used to interpr

et this



                                                                 result as zayda

l/abnormal.



[facility]2018-05-06 07:07:00





             Test Item    Value        Reference Range Interpretation Comments

 

             ALT (test code = ALT) 28           See_Comment                [Auto

mated message] The



                                                                 system which ge

nerated this



                                                                 result transmit

huma



                                                                 reference range

: <=65. The



                                                                 reference range

 was not



                                                                 used to interpr

et this



                                                                 result as zayda

l/abnormal.



[facility]2018-05-06 07:07:00





             Test Item    Value        Reference Range Interpretation Comments

 

             Potassium Lvl (test code = Potassium 4.4          3.5-5.1          

         



             Lvl)                                                



[place] 691878-05-06 07:07:00





             Test Item    Value        Reference Range Interpretation Comments

 

             Sodium Lvl (test code = Sodium Lvl) 147          135-145           

        



[place] 691878-05-06 07:07:00





             Test Item    Value        Reference Range Interpretation Comments

 

             CO2 (test code = CO2) 25           24-32                     



[place] 691878-05-06 07:07:00





             Test Item    Value        Reference Range Interpretation Comments

 

             Chloride Lvl (test code = Chloride Lvl) 114                  

            



[facility]2018-05-06 07:07:00





             Test Item    Value        Reference Range Interpretation Comments

 

             B/C Ratio (test code = B/C Ratio) 16 1         6-25                

      



[place]1878-05-06 07:07:00





             Test Item    Value        Reference Range Interpretation Comments

 

             Calcium Lvl (test code = Calcium Lvl) 8.7          8.5-10.5        

          



[facility]2018-05-06 07:07:00





             Test Item    Value        Reference Range Interpretation Comments

 

             AGAP (test code = AGAP) 12.4         10.0-20.0                 



[facility]2018-05-06 07:07:00





             Test Item    Value        Reference Range Interpretation Comments

 

             Total Protein (test code = Total 7.3          6.4-8.4              

     



             Protein)                                            



[facility] DallasGmaaovoDFFDRBWHZP6102-43-22 07:07:00





             Test Item    Value        Reference Range Interpretation Comments

 

             Platelet (test code = Platelet) 345          133-450               

    



[facility] DallasPfbnaizHFEMBGITMV8160-91-51 07:07:00





             Test Item    Value        Reference Range Interpretation Comments

 

             MPV (test code = MPV) 8.7          7.4-10.4                  



[place] 489578-05-06 07:07:00





             Test Item    Value        Reference Range Interpretation Comments

 

             WBC (test code = WBC) 6.9          3.7-10.4                  



[place] 489578-05-06 07:07:00





             Test Item    Value        Reference Range Interpretation Comments

 

             RBC (test code = RBC) 5.30         4.70-6.10                 



[facility] DallasBxgefapHSWANXSTWR9654-57-91 07:07:00





             Test Item    Value        Reference Range Interpretation Comments

 

             MCHC (test code = MCHC) 32.8         32.0-36.0                 



[facility] DallasZovkygdAUQGUIWGLO1365-24-00 07:07:00





             Test Item    Value        Reference Range Interpretation Comments

 

             MCH (test code = MCH) 27.9 pg      27.0-31.0                 



[facility] DallasLpbyyovLFPKZYWBPT0540-66-34 07:07:00





             Test Item    Value        Reference Range Interpretation Comments

 

             Hgb (test code = Hgb) 14.8         14.0-18.0                 



[facility] DallasKatnyzyUTRFYMPXRZ0246-51-06 07:07:00





             Test Item    Value        Reference Range Interpretation Comments

 

             MCV (test code = MCV) 85.0         80.0-94.0                 



[facility] DallasJwiqenjAXYBCWMXFZ0712-26-91 07:07:00





             Test Item    Value        Reference Range Interpretation Comments

 

             Hct (test code = Hct) 45.1         42.0-54.0                 



[facility] DallasGpsiudsSFIWRFYRFV5810-91-47 07:07:00





             Test Item    Value        Reference Range Interpretation Comments

 

             RDW (test code = RDW) 17.5         11.5-14.5                 



[facility] DallasZslglklMMXCSLTTIH0138-98-90 07:07:00





             Test Item    Value        Reference Range Interpretation Comments

 

             Eosinophils # (test code 0.2          See_Comment                [A

utomated message] The



             = Eosinophils #)                                        system whic

h generated



                                                                 this result tra

nsmitted



                                                                 reference range

: <=0.5.



                                                                 The reference r

zhen was



                                                                 not used to int

erpret



                                                                 this result as



                                                                 normal/abnormal

.



[facility] DallasPjoymjoWFSOLXIJGM3746-73-25 07:07:00





             Test Item    Value        Reference Range Interpretation Comments

 

             Lymphocytes # (test code = Lymphocytes 2.0          1.0-5.5        

           



             #)                                                  



[facility] DallasQxnyyyfSRUFHKMVDU1225-81-86 07:07:00





             Test Item    Value        Reference Range Interpretation Comments

 

             Monocytes # (test code 0.7          See_Comment                [Aut

omated message] The



             = Monocytes #)                                        system which 

generated



                                                                 this result tra

nsmitted



                                                                 reference range

: <=0.8.



                                                                 The reference r

zhen was



                                                                 not used to int

erpret



                                                                 this result as



                                                                 normal/abnormal

.



[facility] DallasPmrrfkeTDREEASNWN3883-85-47 07:07:00





             Test Item    Value        Reference Range Interpretation Comments

 

             Eosinophils (test code = 2.4          See_Comment                [A

utomated message] The



             Eosinophils)                                        system which ge

nerated



                                                                 this result tra

nsmitted



                                                                 reference range

: <=4.0.



                                                                 The reference r

zhen was



                                                                 not used to int

erpret



                                                                 this result as



                                                                 normal/abnormal

.



[facility] DallasSvigvmkJNJOZUIHJN0480-86-70 07:07:00





             Test Item    Value        Reference Range Interpretation Comments

 

             Basophils (test code = 0.6          See_Comment                [Aut

omated message] The



             Basophils)                                          system which ge

nerated



                                                                 this result tra

nsmitted



                                                                 reference range

: <=1.0.



                                                                 The reference r

zhen was



                                                                 not used to int

erpret



                                                                 this result as



                                                                 normal/abnormal

.



[facility] DallasPcrrgwrAXRIRAWCKC4078-45-65 07:07:00





             Test Item    Value        Reference Range Interpretation Comments

 

             Segs-Bands # (test code = Segs-Bands #) 4.0          1.5-8.1       

            



[facility] DallasRositauDFRLBYQXHP0668-15-71 07:07:00





             Test Item    Value        Reference Range Interpretation Comments

 

             Monocytes (test code = Monocytes) 10.4         2.0-12.0            

      



[facility] DallasZjgwccmXBKZOAZFMN7571-32-71 07:07:00





             Test Item    Value        Reference Range Interpretation Comments

 

             RBC Morph (test code = Normal (18 2:07 AM)                     

      



             RBC Morph)                                          



[facility] DallasCggycgbVYENTCNDQN4893-90-82 07:07:00





             Test Item    Value        Reference Range Interpretation Comments

 

             Segs (test code = Segs) 58.1         45.0-75.0                 



[facility] DallasXyqvesxTCICAGLHVS4856-04-95 07:07:00





             Test Item    Value        Reference Range Interpretation Comments

 

             Plt Morph (test code = Normal (18 2:07 AM)                     

      



             Plt Morph)                                          



[facility] DallasMpyzwbbYRCCFEDDWG5250-13-07 07:07:00





             Test Item    Value        Reference Range Interpretation Comments

 

             Lymphocytes (test code = Lymphocytes) 28.5         20.0-40.0       

          



[facility]2018-05-06 07:07:00





             Test Item    Value        Reference Range Interpretation Comments

 

             Glucose Lvl (test code = Glucose Lvl) 74           70-99           

          



[facility]2018-05-06 07:07:00





             Test Item    Value        Reference Range Interpretation Comments

 

             BUN (test code = BUN) 12           7-22                      



[facility]2018-05-06 07:07:00





             Test Item    Value        Reference Range Interpretation Comments

 

             Creatinine Lvl (test code = Creatinine 0.75         0.50-1.40      

           



             Lvl)                                                



[facility]2018-05-06 07:07:00





             Test Item    Value        Reference Range Interpretation Comments

 

             eGFR (test code = eGFR) 140                                    



[facility]2018-05-06 07:07:00





             Test Item    Value        Reference Range Interpretation Comments

 

             Albumin Lvl (test code = Albumin Lvl) 2.9          3.5-5.0         

          



[place]1878-05-06 07:07:00





             Test Item    Value        Reference Range Interpretation Comments

 

             Globulin (test code = Globulin) 4.4          2.7-4.2               

    



[place]1878-05-06 07:07:00





             Test Item    Value        Reference Range Interpretation Comments

 

             A/G Ratio (test code = A/G Ratio) 0.7 1        0.7-1.6             

      



[place]1878-05-06 07:07:00





             Test Item    Value        Reference Range Interpretation Comments

 

             Bili Total (test code = Bili Total) 0.4          0.2-1.3           

        



[place]1878-05-06 07:07:00





             Test Item    Value        Reference Range Interpretation Comments

 

             Alk Phos (test code = Alk Phos) 71                           

    



[place]1878-05-06 07:07:00





             Test Item    Value        Reference Range Interpretation Comments

 

             AST (test code = AST) 15           See_Comment                [Auto

mated message] The



                                                                 system which ge

nerated this



                                                                 result transmit

huma



                                                                 reference range

: <=37. The



                                                                 reference range

 was not



                                                                 used to interpr

et this



                                                                 result as zayda

l/abnormal.



[facility]2018-05-06 07:07:00





             Test Item    Value        Reference Range Interpretation Comments

 

             ALT (test code = ALT) 28           See_Comment                [Auto

mated message] The



                                                                 system which ge

nerated this



                                                                 result transmit

huma



                                                                 reference range

: <=65. The



                                                                 reference range

 was not



                                                                 used to interpr

et this



                                                                 result as zayda

l/abnormal.



[place]1878-05-06 07:07:00





             Test Item    Value        Reference Range Interpretation Comments

 

             Potassium Lvl (test code = Potassium 4.4          3.5-5.1          

         



             Lvl)                                                



[facility]2018-05-06 07:07:00





             Test Item    Value        Reference Range Interpretation Comments

 

             Sodium Lvl (test code = Sodium Lvl) 147          135-145           

        



[place]1878-05-06 07:07:00





             Test Item    Value        Reference Range Interpretation Comments

 

             CO2 (test code = CO2) 25           24-32                     



[facility]2018-05-06 07:07:00





             Test Item    Value        Reference Range Interpretation Comments

 

             Chloride Lvl (test code = Chloride Lvl) 114                  

            



[facility] [facility]2018-[facility]-06 07:07:00





             Test Item    Value        Reference Range Interpretation Comments

 

             B/C Ratio (test code = B/C Ratio) 16 1         6-25                

      



[place] [id number] 07:07:00





             Test Item    Value        Reference Range Interpretation Comments

 

             Calcium Lvl (test code = Calcium Lvl) 8.7          8.5-10.5        

          



[facility]2018-05-06 07:07:00





             Test Item    Value        Reference Range Interpretation Comments

 

             AGAP (test code = AGAP) 12.4         10.0-20.0                 



[facility]2018-05-06 07:07:00





             Test Item    Value        Reference Range Interpretation Comments

 

             Total Protein (test code = Total 7.3          6.4-8.4              

     



             Protein)                                            



[facility]ousfmzHSENALMXSJ2050[facility]85[facility]





             Test Item    Value        Reference Range Interpretation Comments

 

             Platelet (test code = Platelet) 345          133-450               

    



[facility] DallasCvdqbgoZGTMAFHZWE2578-38-09 07:07:00





             Test Item    Value        Reference Range Interpretation Comments

 

             MPV (test code = MPV) 8.7          7.4-10.4                  



[place] 489578-05-06 07:07:00





             Test Item    Value        Reference Range Interpretation Comments

 

             WBC (test code = WBC) 6.9          3.7-10.4                  



[place] 489578-05-06 07:07:00





             Test Item    Value        Reference Range Interpretation Comments

 

             RBC (test code = RBC) 5.30         4.70-6.10                 



[facility] DallasUggkbjwBIGXOHADMU2420-13-01 07:07:00





             Test Item    Value        Reference Range Interpretation Comments

 

             MCHC (test code = MCHC) 32.8         32.0-36.0                 



[facility] DallasBiifubrRPGYAPUKVG2599-24-67 07:07:00





             Test Item    Value        Reference Range Interpretation Comments

 

             MCH (test code = MCH) 27.9 pg      27.0-31.0                 



[facility] DallasDchdzivRFIMKAAADL7452-43-36 07:07:00





             Test Item    Value        Reference Range Interpretation Comments

 

             Hgb (test code = Hgb) 14.8         14.0-18.0                 



[facility] DallasZxrlvbsZTIXPAWVHN2830-61-96 07:07:00





             Test Item    Value        Reference Range Interpretation Comments

 

             MCV (test code = MCV) 85.0         80.0-94.0                 



[facility] DallasUzyjixnGTMZPXHIKT0724-47-13 07:07:00





             Test Item    Value        Reference Range Interpretation Comments

 

             Hct (test code = Hct) 45.1         42.0-54.0                 



[facility] DallasEyahxkrFMFNSUOVUB8749-25-90 07:07:00





             Test Item    Value        Reference Range Interpretation Comments

 

             RDW (test code = RDW) 17.5         11.5-14.5                 



[facility] DallasCeyngxuSPDUYRVHDZ6130-89-62 07:07:00





             Test Item    Value        Reference Range Interpretation Comments

 

             Eosinophils # (test code 0.2          See_Comment                [A

utomated message] The



             = Eosinophils #)                                        system whic

h generated



                                                                 this result tra

nsmitted



                                                                 reference range

: <=0.5.



                                                                 The reference r

zhen was



                                                                 not used to int

erpret



                                                                 this result as



                                                                 normal/abnormal

.



[facility] DallasJscwwcdTTLZCXWHOG5650-92-54 07:07:00





             Test Item    Value        Reference Range Interpretation Comments

 

             Lymphocytes # (test code = Lymphocytes 2.0          1.0-5.5        

           



             #)                                                  



[facility] DallasCkmibioOWQALTQRTX2226-39-71 07:07:00





             Test Item    Value        Reference Range Interpretation Comments

 

             Monocytes # (test code 0.7          See_Comment                [Aut

omated message] The



             = Monocytes #)                                        system which 

generated



                                                                 this result tra

nsmitted



                                                                 reference range

: <=0.8.



                                                                 The reference r

zhen was



                                                                 not used to int

erpret



                                                                 this result as



                                                                 normal/abnormal

.



[facility] DallasXnnvfsuXEMEVTPOFT1513-87-68 07:07:00





             Test Item    Value        Reference Range Interpretation Comments

 

             Eosinophils (test code = 2.4          See_Comment                [A

utomated message] The



             Eosinophils)                                        system which ge

nerated



                                                                 this result tra

nsmitted



                                                                 reference range

: <=4.0.



                                                                 The reference r

zhen was



                                                                 not used to int

erpret



                                                                 this result as



                                                                 normal/abnormal

.



[facility] DallasNvfvdkkFZIPRJYAJX2667-66-63 07:07:00





             Test Item    Value        Reference Range Interpretation Comments

 

             Basophils (test code = 0.6          See_Comment                [Aut

omated message] The



             Basophils)                                          system which ge

nerated



                                                                 this result tra

nsmitted



                                                                 reference range

: <=1.0.



                                                                 The reference r

zhen was



                                                                 not used to int

erpret



                                                                 this result as



                                                                 normal/abnormal

.



[facility] DallasYdhrwwjBBYXRWTRAZ1798-39-76 07:07:00





             Test Item    Value        Reference Range Interpretation Comments

 

             Segs-Bands # (test code = Segs-Bands #) 4.0          1.5-8.1       

            



[facility] DallasLplfsylRNROJUPRQR9847-09-86 07:07:00





             Test Item    Value        Reference Range Interpretation Comments

 

             Monocytes (test code = Monocytes) 10.4         2.0-12.0            

      



[facility] DallasFummzxeGMWYJEZAZH6861-83-20 07:07:00





             Test Item    Value        Reference Range Interpretation Comments

 

             RBC Morph (test code = Normal (18 2:07 AM)                     

      



             RBC Morph)                                          



[facility] DallasXqixjzzHPKKMMNQMS5971-04-61 07:07:00





             Test Item    Value        Reference Range Interpretation Comments

 

             Segs (test code = Segs) 58.1         45.0-75.0                 



[facility] DallasTyybgjcDJZHJGBCPE2210-72-79 07:07:00





             Test Item    Value        Reference Range Interpretation Comments

 

             Plt Morph (test code = Normal (18 2:07 AM)                     

      



             Plt Morph)                                          



[facility] DallasRkqzzphTOLGMPSDOO0463-21-42 07:07:00





             Test Item    Value        Reference Range Interpretation Comments

 

             Lymphocytes (test code = Lymphocytes) 28.5         20.0-40.0       

          



[facility]2018-05-06 07:07:00





             Test Item    Value        Reference Range Interpretation Comments

 

             Glucose Lvl (test code = Glucose Lvl) 74           70-99           

          



[facility]2018-05-06 07:07:00





             Test Item    Value        Reference Range Interpretation Comments

 

             BUN (test code = BUN) 12           7-22                      



[facility]2018-05-06 07:07:00





             Test Item    Value        Reference Range Interpretation Comments

 

             Creatinine Lvl (test code = Creatinine 0.75         0.50-1.40      

           



             Lvl)                                                



[facility]2018-05-06 07:07:00





             Test Item    Value        Reference Range Interpretation Comments

 

             eGFR (test code = eGFR) 140                                    



[facility]2018-05-06 07:07:00





             Test Item    Value        Reference Range Interpretation Comments

 

             Albumin Lvl (test code = Albumin Lvl) 2.9          3.5-5.0         

          



[facility]2018-05-06 07:07:00





             Test Item    Value        Reference Range Interpretation Comments

 

             Globulin (test code = Globulin) 4.4          2.7-4.2               

    



[place] 691878-05-06 07:07:00





             Test Item    Value        Reference Range Interpretation Comments

 

             A/G Ratio (test code = A/G Ratio) 0.7 1        0.7-1.6             

      



[place] 691878-05-06 07:07:00





             Test Item    Value        Reference Range Interpretation Comments

 

             Bili Total (test code = Bili Total) 0.4          0.2-1.3           

        



[place] [address]-05-06 07:07:00





             Test Item    Value        Reference Range Interpretation Comments

 

             Alk Phos (test code = Alk Phos) 71                           

    



[place] 691878-05-06 07:07:00





             Test Item    Value        Reference Range Interpretation Comments

 

             AST (test code = AST) 15           See_Comment                [Auto

mated message] The



                                                                 system which ge

nerated this



                                                                 result transmit

huma



                                                                 reference range

: <=37. The



                                                                 reference range

 was not



                                                                 used to interpr

et this



                                                                 result as zayda

l/abnormal.



[place] [address]-05-06 07:07:00





             Test Item    Value        Reference Range Interpretation Comments

 

             ALT (test code = ALT) 28           See_Comment                [Auto

mated message] The



                                                                 system which ge

nerated this



                                                                 result transmit

huma



                                                                 reference range

: <=65. The



                                                                 reference range

 was not



                                                                 used to interpr

et this



                                                                 result as zayda

l/abnormal.



[place]1878-05-06 07:07:00





             Test Item    Value        Reference Range Interpretation Comments

 

             Potassium Lvl (test code = Potassium 4.4          3.5-5.1          

         



             Lvl)                                                



[facility]2018-05-06 07:07:00





             Test Item    Value        Reference Range Interpretation Comments

 

             Sodium Lvl (test code = Sodium Lvl) 147          135-145           

        



[place] 691878-05-06 07:07:00





             Test Item    Value        Reference Range Interpretation Comments

 

             CO2 (test code = CO2) 25           24-32                     



[place] 691878-05-06 07:07:00





             Test Item    Value        Reference Range Interpretation Comments

 

             Chloride Lvl (test code = Chloride Lvl) 114                  

            



[place] 691878-05-06 07:07:00





             Test Item    Value        Reference Range Interpretation Comments

 

             B/C Ratio (test code = B/C Ratio) 16 1         6-25                

      



[place] 691878-05-06 07:07:00





             Test Item    Value        Reference Range Interpretation Comments

 

             Calcium Lvl (test code = Calcium Lvl) 8.7          8.5-10.5        

          



[facility] TBWRR2620-52-72 07:07:00





             Test Item    Value        Reference Range Interpretation Comments

 

             AGAP (test code = AGAP) 12.4         10.0-20.0                 



[facility]2018-05-06 07:07:00





             Test Item    Value        Reference Range Interpretation Comments

 

             Total Protein (test code = Total 7.3          6.4-8.4              

     



             Protein)                                            



[facility] DallasZqccbmeGOMGKQSFHL7898-72-22 07:07:00





             Test Item    Value        Reference Range Interpretation Comments

 

             Platelet (test code = Platelet) 345          133-450               

    



[facility] DallasYqwiihgEAXOPSGXAY7826-99-07 07:07:00





             Test Item    Value        Reference Range Interpretation Comments

 

             MPV (test code = MPV) 8.7          7.4-10.4                  



[facility] DallasXogynsaCPRWIWAOHA5036-97-04 07:07:00





             Test Item    Value        Reference Range Interpretation Comments

 

             WBC (test code = WBC) 6.9          3.7-10.4                  



[facility] DallasTyujnucYWFQTEDKUJ4442-09-21 07:07:00





             Test Item    Value        Reference Range Interpretation Comments

 

             RBC (test code = RBC) 5.30         4.70-6.10                 



[facility] DallasVvtkdqbHFHVZQUAKF7471-09-51 07:07:00





             Test Item    Value        Reference Range Interpretation Comments

 

             MCHC (test code = MCHC) 32.8         32.0-36.0                 



[facility] DallasLoieijcISIPNOZODK4506-66-40 07:07:00





             Test Item    Value        Reference Range Interpretation Comments

 

             MCH (test code = MCH) 27.9 pg      27.0-31.0                 



[facility] DallasYwmrxesZIEEWBXGKG6872-23-03 07:07:00





             Test Item    Value        Reference Range Interpretation Comments

 

             Hgb (test code = Hgb) 14.8         14.0-18.0                 



[facility] DallasDkevrliMZAOXEDNXP0393-07-02 07:07:00





             Test Item    Value        Reference Range Interpretation Comments

 

             MCV (test code = MCV) 85.0         80.0-94.0                 



[facility] DallasToypmbpAOCATWGZSM5855-20-09 07:07:00





             Test Item    Value        Reference Range Interpretation Comments

 

             Hct (test code = Hct) 45.1         42.0-54.0                 



[place] 489578-05-06 07:07:00





             Test Item    Value        Reference Range Interpretation Comments

 

             RDW (test code = RDW) 17.5         11.5-14.5                 



[facility] DallasSrpbpobVFDJJXWFUS1817-45-37 07:07:00





             Test Item    Value        Reference Range Interpretation Comments

 

             Eosinophils # (test code 0.2          See_Comment                [A

utomated message] The



             = Eosinophils #)                                        system whic

h generated



                                                                 this result tra

nsmitted



                                                                 reference range

: <=0.5.



                                                                 The reference r

zhen was



                                                                 not used to int

erpret



                                                                 this result as



                                                                 normal/abnormal

.



[facility] DallasGqxlamsLCOMAJNNRV7667-34-86 07:07:00





             Test Item    Value        Reference Range Interpretation Comments

 

             Lymphocytes # (test code = Lymphocytes 2.0          1.0-5.5        

           



             #)                                                  



[facility] DallasVmaeayjLDQRDRHICP6160-74-18 07:07:00





             Test Item    Value        Reference Range Interpretation Comments

 

             Monocytes # (test code 0.7          See_Comment                [Aut

omated message] The



             = Monocytes #)                                        system which 

generated



                                                                 this result tra

nsmitted



                                                                 reference range

: <=0.8.



                                                                 The reference r

zhen was



                                                                 not used to int

erpret



                                                                 this result as



                                                                 normal/abnormal

.



[facility] DallasVeyxzvkLGMTZTMQAK3665-54-84 07:07:00





             Test Item    Value        Reference Range Interpretation Comments

 

             Eosinophils (test code = 2.4          See_Comment                [A

utomated message] The



             Eosinophils)                                        system which ge

nerated



                                                                 this result tra

nsmitted



                                                                 reference range

: <=4.0.



                                                                 The reference r

zhen was



                                                                 not used to int

erpret



                                                                 this result as



                                                                 normal/abnormal

.



[facility] DallasJpkkrusUOZOUAHQNE8018-06-53 07:07:00





             Test Item    Value        Reference Range Interpretation Comments

 

             Basophils (test code = 0.6          See_Comment                [Aut

omated message] The



             Basophils)                                          system which ge

nerated



                                                                 this result tra

nsmitted



                                                                 reference range

: <=1.0.



                                                                 The reference r

zhen was



                                                                 not used to int

erpret



                                                                 this result as



                                                                 normal/abnormal

.



[facility] DallasRauaigzLUVISCXVME0712-14-20 07:07:00





             Test Item    Value        Reference Range Interpretation Comments

 

             Segs-Bands # (test code = Segs-Bands #) 4.0          1.5-8.1       

            



[facility] DallasZpzwyorLRWDKEIDKS8289-55-52 07:07:00





             Test Item    Value        Reference Range Interpretation Comments

 

             Monocytes (test code = Monocytes) 10.4         2.0-12.0            

      



[facility] DallasQpmlptqXUGPDEYXPB9395-32-68 07:07:00





             Test Item    Value        Reference Range Interpretation Comments

 

             RBC Morph (test code = Normal (18 2:07 AM)                     

      



             RBC Morph)                                          



[facility] DallasEucineiGYXVUFGXEB7849-82-30 07:07:00





             Test Item    Value        Reference Range Interpretation Comments

 

             Segs (test code = Segs) 58.1         45.0-75.0                 



[facility] DallasAyyimqiYYKPFGUCIZ7564-33-56 07:07:00





             Test Item    Value        Reference Range Interpretation Comments

 

             Plt Morph (test code = Normal (18 2:07 AM)                     

      



             Plt Morph)                                          



[facility] DallasMrblxomXIDLQKSFBC7041-50-89 07:07:00





             Test Item    Value        Reference Range Interpretation Comments

 

             Lymphocytes (test code = Lymphocytes) 28.5         20.0-40.0       

          



[facility]2018-05-06 07:07:00





             Test Item    Value        Reference Range Interpretation Comments

 

             Glucose Lvl (test code = Glucose Lvl) 74           70-99           

          



[facility]2018-05-06 07:07:00





             Test Item    Value        Reference Range Interpretation Comments

 

             BUN (test code = BUN) 12           7-22                      



[facility]2018-05-06 07:07:00





             Test Item    Value        Reference Range Interpretation Comments

 

             Creatinine Lvl (test code = Creatinine 0.75         0.50-1.40      

           



             Lvl)                                                



[facility]2018-05-06 07:07:00





             Test Item    Value        Reference Range Interpretation Comments

 

             eGFR (test code = eGFR) 140                                    



[facility]2018-05-06 07:07:00





             Test Item    Value        Reference Range Interpretation Comments

 

             Albumin Lvl (test code = Albumin Lvl) 2.9          3.5-5.0         

          



[facility]2018-05-06 07:07:00





             Test Item    Value        Reference Range Interpretation Comments

 

             Globulin (test code = Globulin) 4.4          2.7-4.2               

    



[facility]2018-05-06 07:07:00





             Test Item    Value        Reference Range Interpretation Comments

 

             A/G Ratio (test code = A/G Ratio) 0.7 1        0.7-1.6             

      



[facility]2018-05-06 07:07:00





             Test Item    Value        Reference Range Interpretation Comments

 

             Bili Total (test code = Bili Total) 0.4          0.2-1.3           

        



[facility]2018-05-06 07:07:00





             Test Item    Value        Reference Range Interpretation Comments

 

             Alk Phos (test code = Alk Phos) 71                           

    



[facility] [facility] 07:07:00





             Test Item    Value        Reference Range Interpretation Comments

 

             AST (test code = AST) 15           See_Comment                [Auto

mated message] The



                                                                 system which ge

nerated this



                                                                 result transmit

huma



                                                                 reference range

: <=37. The



                                                                 reference range

 was not



                                                                 used to interpr

et this



                                                                 result as zayda

l/abnormal.



[facility]2018-05-06 07:07:00





             Test Item    Value        Reference Range Interpretation Comments

 

             ALT (test code = ALT) 28           See_Comment                [Auto

mated message] The



                                                                 system which ge

nerated this



                                                                 result transmit

huma



                                                                 reference range

: <=65. The



                                                                 reference range

 was not



                                                                 used to interpr

et this



                                                                 result as zayda

l/abnormal.



[place]1878-05-06 07:07:00





             Test Item    Value        Reference Range Interpretation Comments

 

             Potassium Lvl (test code = Potassium 4.4          3.5-5.1          

         



             Lvl)                                                



[facility]2018-05-06 07:07:00





             Test Item    Value        Reference Range Interpretation Comments

 

             Sodium Lvl (test code = Sodium Lvl) 147          135-145           

        



[place] 691878-05-06 07:07:00





             Test Item    Value        Reference Range Interpretation Comments

 

             CO2 (test code = CO2) 25           24-32                     



[place]1878-05-06 07:07:00





             Test Item    Value        Reference Range Interpretation Comments

 

             Chloride Lvl (test code = Chloride Lvl) 114                  

            



[facility]2018-05-06 07:07:00





             Test Item    Value        Reference Range Interpretation Comments

 

             B/C Ratio (test code = B/C Ratio) 16 1         6-25                

      



[place] 691878-05-06 07:07:00





             Test Item    Value        Reference Range Interpretation Comments

 

             Calcium Lvl (test code = Calcium Lvl) 8.7          8.5-10.5        

          



[place] 691878-05-06 07:07:00





             Test Item    Value        Reference Range Interpretation Comments

 

             AGAP (test code = AGAP) 12.4         10.0-20.0                 



[place] 691878-05-06 07:07:00





             Test Item    Value        Reference Range Interpretation Comments

 

             Total Protein (test code = Total 7.3          6.4-8.4              

     



             Protein)                                            



[facility]XgzzmxvNJOPINROYY7013[facility]79 [facility]





             Test Item    Value        Reference Range Interpretation Comments

 

             Platelet (test code = Platelet) 345          133-450               

    



[place] 489578-05-06 07:07:00





             Test Item    Value        Reference Range Interpretation Comments

 

             MPV (test code = MPV) 8.7          7.4-10.4                  



[facility] DallasUlteumtIDOSZTBVZQ0152[facility]05-47 07:07:00





             Test Item    Value        Reference Range Interpretation Comments

 

             WBC (test code = WBC) 6.9          3.7-10.4                  



[facility] DallasYksahgtKBWYLVDRHC9552-24-82 07:07:00





             Test Item    Value        Reference Range Interpretation Comments

 

             RBC (test code = RBC) 5.30         4.70-6.10                 



[facility] DallasJszrkwuMLSDHWTPKV8461-60-17 07:07:00





             Test Item    Value        Reference Range Interpretation Comments

 

             MCHC (test code = MCHC) 32.8         32.0-36.0                 



[facility] DallasOorignxDYCAZESLPC3259-89-69 07:07:00





             Test Item    Value        Reference Range Interpretation Comments

 

             MCH (test code = MCH) 27.9 pg      27.0-31.0                 



[facility] DallasHrpurfeIZYUCNCOMH0562-99-54 07:07:00





             Test Item    Value        Reference Range Interpretation Comments

 

             Hgb (test code = Hgb) 14.8         14.0-18.0                 



[facility] DallasOznilsiCSKEKXLTYT3726-93-19 07:07:00





             Test Item    Value        Reference Range Interpretation Comments

 

             MCV (test code = MCV) 85.0         80.0-94.0                 



[facility] DallasXhjltqxQCRWTIDIJS1240-95-15 07:07:00





             Test Item    Value        Reference Range Interpretation Comments

 

             Hct (test code = Hct) 45.1         42.0-54.0                 



[facility] DallasUkssbszHEPOGRBDMN4112-65-42 07:07:00





             Test Item    Value        Reference Range Interpretation Comments

 

             RDW (test code = RDW) 17.5         11.5-14.5                 



[facility] DallasPkdqozvJASAJUFLFN5651-26-35 07:07:00





             Test Item    Value        Reference Range Interpretation Comments

 

             Eosinophils # (test code 0.2          See_Comment                [A

utomated message] The



             = Eosinophils #)                                        system whic

h generated



                                                                 this result tra

nsmitted



                                                                 reference range

: <=0.5.



                                                                 The reference r

zhen was



                                                                 not used to int

erpret



                                                                 this result as



                                                                 normal/abnormal

.



[facility] DallasAmqegovWWHGVSXCKN8005-22-79 07:07:00





             Test Item    Value        Reference Range Interpretation Comments

 

             Lymphocytes # (test code = Lymphocytes 2.0          1.0-5.5        

           



             #)                                                  



[facility] DallasXiggdaeCZDUIOHDUM1591-47-53 07:07:00





             Test Item    Value        Reference Range Interpretation Comments

 

             Monocytes # (test code 0.7          See_Comment                [Aut

omated message] The



             = Monocytes #)                                        system which 

generated



                                                                 this result tra

nsmitted



                                                                 reference range

: <=0.8.



                                                                 The reference r

zhen was



                                                                 not used to int

erpret



                                                                 this result as



                                                                 normal/abnormal

.



[facility] DallasZxpbwdmEHMISTCPIO0815-54-73 07:07:00





             Test Item    Value        Reference Range Interpretation Comments

 

             Eosinophils (test code = 2.4          See_Comment                [A

utomated message] The



             Eosinophils)                                        system which ge

nerated



                                                                 this result tra

nsmitted



                                                                 reference range

: <=4.0.



                                                                 The reference r

zhen was



                                                                 not used to int

erpret



                                                                 this result as



                                                                 normal/abnormal

.



[facility] DallasKpoleikKSPHSWKVOC0374-49-63 07:07:00





             Test Item    Value        Reference Range Interpretation Comments

 

             Basophils (test code = 0.6          See_Comment                [Aut

omated message] The



             Basophils)                                          system which ge

nerated



                                                                 this result tra

nsmitted



                                                                 reference range

: <=1.0.



                                                                 The reference r

zhen was



                                                                 not used to int

erpret



                                                                 this result as



                                                                 normal/abnormal

.



[facility] DallasRcsznbfJKLEQCQGIF1640-86-62 07:07:00





             Test Item    Value        Reference Range Interpretation Comments

 

             Segs-Bands # (test code = Segs-Bands #) 4.0          1.5-8.1       

            



[facility] DallasQobuwntANRVMVJSLI6426-49-83 07:07:00





             Test Item    Value        Reference Range Interpretation Comments

 

             Monocytes (test code = Monocytes) 10.4         2.0-12.0            

      



[facility] DallasZspboauPKQPGNVLUG2053-78-03 07:07:00





             Test Item    Value        Reference Range Interpretation Comments

 

             RBC Morph (test code = Normal (18 2:07 AM)                     

      



             RBC Morph)                                          



[facility] DallasSjwcmzxPHGZTQVDQA6058-76-27 07:07:00





             Test Item    Value        Reference Range Interpretation Comments

 

             Segs (test code = Segs) 58.1         45.0-75.0                 



[facility] DallasXuchyzaFTTYBZOMAJ5838-01-07 07:07:00





             Test Item    Value        Reference Range Interpretation Comments

 

             Plt Morph (test code = Normal (18 2:07 AM)                     

      



             Plt Morph)                                          



[facility] DallasRdtzmanJUIPIHAIYT5475-59-17 07:07:00





             Test Item    Value        Reference Range Interpretation Comments

 

             Lymphocytes (test code = Lymphocytes) 28.5         20.0-40.0       

          



[facility]2018-05-06 07:07:00





             Test Item    Value        Reference Range Interpretation Comments

 

             Glucose Lvl (test code = Glucose Lvl) 74           70-99           

          



[place]1878-05-06 07:07:00





             Test Item    Value        Reference Range Interpretation Comments

 

             BUN (test code = BUN) 12           7-22                      



[place] [address]-05-06 07:07:00





             Test Item    Value        Reference Range Interpretation Comments

 

             Creatinine Lvl (test code = Creatinine 0.75         0.50-1.40      

           



             Lvl)                                                



[place] [address]-05-06 07:07:00





             Test Item    Value        Reference Range Interpretation Comments

 

             eGFR (test code = eGFR) 140                                    



[place]1878-05-06 07:07:00





             Test Item    Value        Reference Range Interpretation Comments

 

             Albumin Lvl (test code = Albumin Lvl) 2.9          3.5-5.0         

          



[place] [address]-05-06 07:07:00





             Test Item    Value        Reference Range Interpretation Comments

 

             Globulin (test code = Globulin) 4.4          2.7-4.2               

    



[place] [address]-05-06 07:07:00





             Test Item    Value        Reference Range Interpretation Comments

 

             A/G Ratio (test code = A/G Ratio) 0.7 1        0.7-1.6             

      



[place] [address]-05-06 07:07:00





             Test Item    Value        Reference Range Interpretation Comments

 

             Bili Total (test code = Bili Total) 0.4          0.2-1.3           

        



[place] [address]-05-06 07:07:00





             Test Item    Value        Reference Range Interpretation Comments

 

             Alk Phos (test code = Alk Phos) 71                           

    



[place]1878-05-06 07:07:00





             Test Item    Value        Reference Range Interpretation Comments

 

             AST (test code = AST) 15           See_Comment                [Auto

mated message] The



                                                                 system which ge

nerated this



                                                                 result transmit

huma



                                                                 reference range

: <=37. The



                                                                 reference range

 was not



                                                                 used to interpr

et this



                                                                 result as zayda

l/abnormal.



[place]1878-05-06 07:07:00





             Test Item    Value        Reference Range Interpretation Comments

 

             ALT (test code = ALT) 28           See_Comment                [Auto

mated message] The



                                                                 system which ge

nerated this



                                                                 result transmit

huma



                                                                 reference range

: <=65. The



                                                                 reference range

 was not



                                                                 used to interpr

et this



                                                                 result as zayda

l/abnormal.



[facility]2018-05-06 07:07:00





             Test Item    Value        Reference Range Interpretation Comments

 

             Potassium Lvl (test code = Potassium 4.4          3.5-5.1          

         



             Lvl)                                                



[facility]2018-05-06 07:07:00





             Test Item    Value        Reference Range Interpretation Comments

 

             Sodium Lvl (test code = Sodium Lvl) 147          135-145           

        



[place]1878-05-06 07:07:00





             Test Item    Value        Reference Range Interpretation Comments

 

             CO2 (test code = CO2) 25           24-32                     



[facility]2018-05-06 07:07:00





             Test Item    Value        Reference Range Interpretation Comments

 

             Chloride Lvl (test code = Chloride Lvl) 114                  

            



[place]1878-05-06 07:07:00





             Test Item    Value        Reference Range Interpretation Comments

 

             B/C Ratio (test code = B/C Ratio) 16 1         6-25                

      



[place]1878-05-06 07:07:00





             Test Item    Value        Reference Range Interpretation Comments

 

             Calcium Lvl (test code = Calcium Lvl) 8.7          8.5-10.5        

          



[place]1878-05-06 07:07:00





             Test Item    Value        Reference Range Interpretation Comments

 

             AGAP (test code = AGAP) 12.4         10.0-20.0                 



[facility]2018-05-06 07:07:00





             Test Item    Value        Reference Range Interpretation Comments

 

             Total Protein (test code = Total 7.3          6.4-8.4              

     



             Protein)                                            



[facility] DallasHgktgowEKCTCMCQKG3558-41-41 07:07:00





             Test Item    Value        Reference Range Interpretation Comments

 

             Platelet (test code = Platelet) 345          133-450               

    



[facility] DallasKhmrwjlWFYNCNVDCJ0392-63-64 07:07:00





             Test Item    Value        Reference Range Interpretation Comments

 

             MPV (test code = MPV) 8.7          7.4-10.4                  



[place]9578-05-06 07:07:00





             Test Item    Value        Reference Range Interpretation Comments

 

             WBC (test code = WBC) 6.9          3.7-10.4                  



[place]9578-05-06 07:07:00





             Test Item    Value        Reference Range Interpretation Comments

 

             RBC (test code = RBC) 5.30         4.70-6.10                 



[place]9578-05-06 07:07:00





             Test Item    Value        Reference Range Interpretation Comments

 

             MCHC (test code = MCHC) 32.8         32.0-36.0                 



[facility] DallasVoyzbxhFUYYASSXHK2002-57-07 07:07:00





             Test Item    Value        Reference Range Interpretation Comments

 

             MCH (test code = MCH) 27.9 pg      27.0-31.0                 



[facility] DallasQqanuevHPVEVVAQJN3460-39-87 07:07:00





             Test Item    Value        Reference Range Interpretation Comments

 

             Hgb (test code = Hgb) 14.8         14.0-18.0                 



[facility] DallasKqfgljqBIMNYLTEMR1882-49-06 07:07:00





             Test Item    Value        Reference Range Interpretation Comments

 

             MCV (test code = MCV) 85.0         80.0-94.0                 



[facility] DallasUvkpxyiOVPCSXAOKL9115-84-32 07:07:00





             Test Item    Value        Reference Range Interpretation Comments

 

             Hct (test code = Hct) 45.1         42.0-54.0                 



[facility] DallasWwnibctUFIVCRXBYR4598-67-10 07:07:00





             Test Item    Value        Reference Range Interpretation Comments

 

             RDW (test code = RDW) 17.5         11.5-14.5                 



[facility] DallasZsybljbFCQTJILIZG3549-56-12 07:07:00





             Test Item    Value        Reference Range Interpretation Comments

 

             Eosinophils # (test code 0.2          See_Comment                [A

utomated message] The



             = Eosinophils #)                                        system whic

h generated



                                                                 this result tra

nsmitted



                                                                 reference range

: <=0.5.



                                                                 The reference r

zhen was



                                                                 not used to int

erpret



                                                                 this result as



                                                                 normal/abnormal

.



[facility] DallasWmnqexaRWZKAKKIEZ1004-85-19 07:07:00





             Test Item    Value        Reference Range Interpretation Comments

 

             Lymphocytes # (test code = Lymphocytes 2.0          1.0-5.5        

           



             #)                                                  



[facility] DallasQxezpscTMURPHXSMC7468-35-15 07:07:00





             Test Item    Value        Reference Range Interpretation Comments

 

             Monocytes # (test code 0.7          See_Comment                [Aut

omated message] The



             = Monocytes #)                                        system which 

generated



                                                                 this result tra

nsmitted



                                                                 reference range

: <=0.8.



                                                                 The reference r

zhen was



                                                                 not used to int

erpret



                                                                 this result as



                                                                 normal/abnormal

.



[facility] DallasMrtpymgGDYXESVMEE3409-08-96 07:07:00





             Test Item    Value        Reference Range Interpretation Comments

 

             Eosinophils (test code = 2.4          See_Comment                [A

utomated message] The



             Eosinophils)                                        system which ge

nerated



                                                                 this result tra

nsmitted



                                                                 reference range

: <=4.0.



                                                                 The reference r

zhen was



                                                                 not used to int

erpret



                                                                 this result as



                                                                 normal/abnormal

.



[facility] DallasGhzrhocCBVEUTFFIG5843-22-78 07:07:00





             Test Item    Value        Reference Range Interpretation Comments

 

             Basophils (test code = 0.6          See_Comment                [Aut

omated message] The



             Basophils)                                          system which ge

nerated



                                                                 this result tra

nsmitted



                                                                 reference range

: <=1.0.



                                                                 The reference r

zhen was



                                                                 not used to int

erpret



                                                                 this result as



                                                                 normal/abnormal

.



[facility] DallasSqrmrgvXIHJBVTQMO7372-11-78 07:07:00





             Test Item    Value        Reference Range Interpretation Comments

 

             Segs-Bands # (test code = Segs-Bands #) 4.0          1.5-8.1       

            



[facility] DallasTfowcuhHNVFULSCTE0415-11-29 07:07:00





             Test Item    Value        Reference Range Interpretation Comments

 

             Monocytes (test code = Monocytes) 10.4         2.0-12.0            

      



[facility] DallasAnjdndtUSOBUCQTGC1155-38-58 07:07:00





             Test Item    Value        Reference Range Interpretation Comments

 

             RBC Morph (test code = Normal (18 2:07 AM)                     

      



             RBC Morph)                                          



[facility] DallasUzqntugTJYDDIGYXX0020-25-98 07:07:00





             Test Item    Value        Reference Range Interpretation Comments

 

             Segs (test code = Segs) 58.1         45.0-75.0                 



[facility] DallasLewupyzWNRCYOGXLE9713-76-07 07:07:00





             Test Item    Value        Reference Range Interpretation Comments

 

             Plt Morph (test code = Normal (18 2:07 AM)                     

      



             Plt Morph)                                          



[facility] DallasTbvqjbsEARNUFSWFK6762-85-50 07:07:00





             Test Item    Value        Reference Range Interpretation Comments

 

             Lymphocytes (test code = Lymphocytes) 28.5         20.0-40.0       

          



[facility] DallasEkxrdouLKUQSICULJ2119-80-72 16:07:00





             Test Item    Value        Reference Range Interpretation Comments

 

             Basophils # (test code 0.1          See_Comment                [Aut

omated message] The



             = Basophils #)                                        system which 

generated



                                                                 this result tra

nsmitted



                                                                 reference range

: <=0.2.



                                                                 The reference r

zhen was



                                                                 not used to int

erpret



                                                                 this result as



                                                                 normal/abnormal

.



[facility] DallasWaitvipXWKWHXONBB3234-76-86 16:07:00





             Test Item    Value        Reference Range Interpretation Comments

 

             Polychrom (test code = Moderate *ABN*(18                       

    



             Polychrom)   11:07 AM)                              



[facility] DallasHlpoclbYIAAJHSBIW4483-05-39 16:07:00





             Test Item    Value        Reference Range Interpretation Comments

 

             Basophils # (test code 0.1          See_Comment                [Aut

omated message] The



             = Basophils #)                                        system which 

generated



                                                                 this result tra

nsmitted



                                                                 reference range

: <=0.2.



                                                                 The reference r

zhen was



                                                                 not used to int

erpret



                                                                 this result as



                                                                 normal/abnormal

.



[facility] DallasWpcwypjBURPRJKRTL8997-96-47 16:07:00





             Test Item    Value        Reference Range Interpretation Comments

 

             Polychrom (test code = Moderate *ABN*(18                       

    



             Polychrom)   11:07 AM)                              



[facility] DallasTxprwvuMKMAUWIIJV2923-74-81 16:07:00





             Test Item    Value        Reference Range Interpretation Comments

 

             Basophils # (test code 0.1          See_Comment                [Aut

omated message] The



             = Basophils #)                                        system which 

generated



                                                                 this result tra

nsmitted



                                                                 reference range

: <=0.2.



                                                                 The reference r

zhen was



                                                                 not used to int

erpret



                                                                 this result as



                                                                 normal/abnormal

.



[facility] DallasPphgjvyVXSXCJGVPC3162-63-72 16:07:00





             Test Item    Value        Reference Range Interpretation Comments

 

             Polychrom (test code = Moderate *ABN*(18                       

    



             Polychrom)   11:07 AM)                              



[facility] DallasXnbmonxRFBUADQUSV7993-97-22 16:07:00





             Test Item    Value        Reference Range Interpretation Comments

 

             Basophils # (test code 0.1          See_Comment                [Aut

omated message] The



             = Basophils #)                                        system which 

generated



                                                                 this result tra

nsmitted



                                                                 reference range

: <=0.2.



                                                                 The reference r

zhen was



                                                                 not used to int

erpret



                                                                 this result as



                                                                 normal/abnormal

.



[facility] DallasIlippuxCZGLOASFPG8190-60-33 16:07:00





             Test Item    Value        Reference Range Interpretation Comments

 

             Polychrom (test code = Moderate *ABN*(18                       

    



             Polychrom)   11:07 AM)                              



[facility] DallasIxwjlzfHHJQOLWYPD6950-96-89 16:07:00





             Test Item    Value        Reference Range Interpretation Comments

 

             Basophils # (test code 0.1          See_Comment                [Aut

omated message] The



             = Basophils #)                                        system which 

generated



                                                                 this result tra

nsmitted



                                                                 reference range

: <=0.2.



                                                                 The reference r

zhen was



                                                                 not used to int

erpret



                                                                 this result as



                                                                 normal/abnormal

.



[facility] DallasRikpuweBLOKPTZOVH1178-04-09 16:07:00





             Test Item    Value        Reference Range Interpretation Comments

 

             Polychrom (test code = Moderate *ABN*(18                       

    



             Polychrom)   11:07 AM)                              



[facility] DallasGunvtzdWSABXLEKHQ5624-06-71 16:07:00





             Test Item    Value        Reference Range Interpretation Comments

 

             Basophils # (test code 0.1          See_Comment                [Aut

omated message] The



             = Basophils #)                                        system which 

generated



                                                                 this result tra

nsmitted



                                                                 reference range

: <=0.2.



                                                                 The reference r

zhen was



                                                                 not used to int

erpret



                                                                 this result as



                                                                 normal/abnormal

.



[facility] DallasGdeuudcTJXUHGUKVL5911-87-28 16:07:00





             Test Item    Value        Reference Range Interpretation Comments

 

             Polychrom (test code = Moderate *ABN*(18                       

    



             Polychrom)   11:07 AM)                              



[facility] DallasQbglntaVJGEGVJAVB3635-23-20 16:07:00





             Test Item    Value        Reference Range Interpretation Comments

 

             Basophils # (test code 0.1          See_Comment                [Aut

omated message] The



             = Basophils #)                                        system which 

generated



                                                                 this result tra

nsmitted



                                                                 reference range

: <=0.2.



                                                                 The reference r

zhen was



                                                                 not used to int

erpret



                                                                 this result as



                                                                 normal/abnormal

.



[facility] DallasFhhphtyBFZCSCMBNS1301-04-07 16:07:00





             Test Item    Value        Reference Range Interpretation Comments

 

             Polychrom (test code = Moderate *ABN*(18                       

    



             Polychrom)   11:07 AM)                              



[facility] QchfshcIKMPCWVENS4630-01-34 06:43:00





             Test Item    Value        Reference Range Interpretation Comments

 

             Vanco Tr TND (test code = Vanco Tr TND) *                          

            



[facility] IutflfvFTIWFKNBTU1856-88-37 06:43:00





             Test Item    Value        Reference Range Interpretation Comments

 

             Vanco Tr (test code = Vanco Tr) 22.3                               

    



[facility] NxzdyrjNHAZSZPOEQ1563-77-70 06:43:00





             Test Item    Value        Reference Range Interpretation Comments

 

             Vanco Tr TND (test code = Vanco Tr TND) *                          

            



[facility] BurybtcGVACWJYMNQ6342-34-05 06:43:00





             Test Item    Value        Reference Range Interpretation Comments

 

             Vanco Tr (test code = Vanco Tr) 22.3                               

    



[facility] YcjyzeeWDJLRRKZPP6548-55-62 06:43:00





             Test Item    Value        Reference Range Interpretation Comments

 

             Vanco Tr TND (test code = Vanco Tr TND) *                          

            



[facility] CigjdvkLBYOLYPLHQ9934-50-49 06:43:00





             Test Item    Value        Reference Range Interpretation Comments

 

             Vanco Tr (test code = Vanco Tr) 22.3                               

    



[facility] HnkzidiJSFDJDLIDC1254-14-34 06:43:00





             Test Item    Value        Reference Range Interpretation Comments

 

             Vanco Tr TND (test code = Vanco Tr TND) *                          

            



[facility] VvjxqdgOZNGYLHJRI9059-95-97 06:43:00





             Test Item    Value        Reference Range Interpretation Comments

 

             Vanco Tr (test code = Vanco Tr) 22.3                               

    



[facility] PffmmozJROYOPANTX2947-83-48 06:43:00





             Test Item    Value        Reference Range Interpretation Comments

 

             Vanco Tr TND (test code = Vanco Tr TND) *                          

            



[facility] JmyjeseWIUQBOJZVH9606-82-77 06:43:00





             Test Item    Value        Reference Range Interpretation Comments

 

             Vanco Tr (test code = Vanco Tr) 22.3                               

    



[facility] VvqevukJVSPBKNEWJ2169-16-28 06:43:00





             Test Item    Value        Reference Range Interpretation Comments

 

             Vanco Tr TND (test code = Vanco Tr TND) *                          

            



[facility] LdzgokaGJOWLYDBGN8329-23-93 06:43:00





             Test Item    Value        Reference Range Interpretation Comments

 

             Vanco Tr (test code = Vanco Tr) 22.3                               

    



[facility] LtdklroYOZLDFANCK9582-26-17 06:43:00





             Test Item    Value        Reference Range Interpretation Comments

 

             Vanco Tr TND (test code = Vanco Tr TND) *                          

            



[facility] XoqwqhpCKKUWYOQGS8461-93-42 06:43:00





             Test Item    Value        Reference Range Interpretation Comments

 

             Vanco Tr (test code = Vanco Tr) 22.3                               

    



[facility] OUQXN7747[facility]32-98 11:45:00





             Test Item    Value        Reference Range Interpretation Comments

 

             Magnesium Lvl (test code = Magnesium 2.3          1.8-2.4          

         



             Lvl)                                                



[facility] HospitalannMiram[facility]rix USCTQ2154-26-89 11:45:00





             Test Item    Value        Reference Range Interpretation Comments

 

             Phosphorus (test code = Phosphorus) 3.5          2.5-4.5           

        



[facility] XqoqronZNJJZRPDJJ8188-04-35 11:45:00





             Test Item    Value        Reference Range Interpretation Comments

 

             PT (test code = PT) 14.0 s       12.0-14.7                 



[facility] HospitalWfikmwfMLDIGHQVWI5395-59-40 11:45:00





             Test Item    Value        Reference Range Interpretation Comments

 

             PTT (test code = PTT) 37.6 s       22.9-35.8                 



[facility] HospitalVxbplahERUFFDROKQ9029-06-62 11:45:00





             Test Item    Value        Reference Range Interpretation Comments

 

             INR (test code = INR) 1.08 1       0.85-1.17                 



[facility]EkmbazkVIGGUHJUMO2403-35-05 11:45:00





             Test Item    Value        Reference Range Interpretation Comments

 

             C-REACTIVE PROTEIN (test code = 13.1                               

    



             C-REACTIVE PROTEIN)                                        



[facility] CenterLdcskxvJPNHMVOLFU4994-04-72 11:45:00





             Test Item    Value        Reference Range Interpretation Comments

 

             Prealbumin (test code = Prealbumin) 25.2         18.0-45.0         

        



[facility]2018-05-04 11:45:00





             Test Item    Value        Reference Range Interpretation Comments

 

             Magnesium Lvl (test code = Magnesium 2.3          1.8-2.4          

         



             Lvl)                                                



[facility]2018-05-04 11:45:00





             Test Item    Value        Reference Range Interpretation Comments

 

             Phosphorus (test code = Phosphorus) 3.5          2.5-4.5           

        



[facility] DallasLfqrajcOOWULFBLYU2892-78-33 11:45:00





             Test Item    Value        Reference Range Interpretation Comments

 

             PT (test code = PT) 14.0 s       12.0-14.7                 



[facility] DallasMwbgtlvDBNQMCAAWG3785-61-67 11:45:00





             Test Item    Value        Reference Range Interpretation Comments

 

             PTT (test code = PTT) 37.6 s       22.9-35.8                 



[facility] DallasSazqptwBZHXTMYRDO9292-73-16 11:45:00





             Test Item    Value        Reference Range Interpretation Comments

 

             INR (test code = INR) 1.08 1       0.85-1.17                 



[facility] CenterPplegzvOLUFXCNRTP5469-57-46 11:45:00





             Test Item    Value        Reference Range Interpretation Comments

 

             C-REACTIVE PROTEIN (test code = 13.1                               

    



             C-REACTIVE PROTEIN)                                        



[facility] CenterVapazedRQEHBIONED0444-32-25 11:45:00





             Test Item    Value        Reference Range Interpretation Comments

 

             Prealbumin (test code = Prealbumin) 25.2         18.0-45.0         

        



[facility]2018-05-04 11:45:00





             Test Item    Value        Reference Range Interpretation Comments

 

             Magnesium Lvl (test code = Magnesium 2.3          1.8-2.4          

         



             Lvl)                                                



[facility]2018-05-04 11:45:00





             Test Item    Value        Reference Range Interpretation Comments

 

             Phosphorus (test code = Phosphorus) 3.5          2.5-4.5           

        



[facility] DallasGrmwnpcSRGMKJABLM6035-88-13 11:45:00





             Test Item    Value        Reference Range Interpretation Comments

 

             PT (test code = PT) 14.0 s       12.0-14.7                 



[facility] DallasFdyijytKLAEWCDXTC6705-09-50 11:45:00





             Test Item    Value        Reference Range Interpretation Comments

 

             PTT (test code = PTT) 37.6 s       22.9-35.8                 



[facility] DallasMigqurlNIUKUYIIIP0185-39-33 11:45:00





             Test Item    Value        Reference Range Interpretation Comments

 

             INR (test code = INR) 1.08 1       0.85-1.17                 



[facility] CenterTecsgbmFXUDXZDRUB2695-18-81 11:45:00





             Test Item    Value        Reference Range Interpretation Comments

 

             C-REACTIVE PROTEIN (test code = 13.1                               

    



             C-REACTIVE PROTEIN)                                        



[facility] CenterDjwpsabKUKDUXGYYM4389-23-68 11:45:00





             Test Item    Value        Reference Range Interpretation Comments

 

             Prealbumin (test code = Prealbumin) 25.2         18.0-45.0         

        



[facility]2018-05-04 11:45:00





             Test Item    Value        Reference Range Interpretation Comments

 

             Magnesium Lvl (test code = Magnesium 2.3          1.8-2.4          

         



             Lvl)                                                



[facility]2018-05-04 11:45:00





             Test Item    Value        Reference Range Interpretation Comments

 

             Phosphorus (test code = Phosphorus) 3.5          2.5-4.5           

        



[facility] DallasOtobjkjNPCVKHWDUK7262-08-16 11:45:00





             Test Item    Value        Reference Range Interpretation Comments

 

             PT (test code = PT) 14.0 s       12.0-14.7                 



[facility] DallasCvdfmxaOHGLJCWRBH8080-35-46 11:45:00





             Test Item    Value        Reference Range Interpretation Comments

 

             PTT (test code = PTT) 37.6 s       22.9-35.8                 



[facility] DallasRkcfjzoULOCZAJFTK0404-34-42 11:45:00





             Test Item    Value        Reference Range Interpretation Comments

 

             INR (test code = INR) 1.08 1       0.85-1.17                 



[facility] CenterTvqxjwnFNDPYUXHBB3314-24-35 11:45:00





             Test Item    Value        Reference Range Interpretation Comments

 

             C-REACTIVE PROTEIN (test code = 13.1                               

    



             C-REACTIVE PROTEIN)                                        



[facility] CenterUfqsqxqQWJANKMYHE7060-21-25 11:45:00





             Test Item    Value        Reference Range Interpretation Comments

 

             Prealbumin (test code = Prealbumin) 25.2         18.0-45.0         

        



[facility]2018-05-04 11:45:00





             Test Item    Value        Reference Range Interpretation Comments

 

             Magnesium Lvl (test code = Magnesium 2.3          1.8-2.4          

         



             Lvl)                                                



[facility]2018-05-04 11:45:00





             Test Item    Value        Reference Range Interpretation Comments

 

             Phosphorus (test code = Phosphorus) 3.5          2.5-4.5           

        



[facility] DallasIxpcainWQLEUPPLJV9566-26-86 11:45:00





             Test Item    Value        Reference Range Interpretation Comments

 

             PT (test code = PT) 14.0 s       12.0-14.7                 



[facility] DallasKiztfixUOVMJGEVJJ1125-05-78 11:45:00





             Test Item    Value        Reference Range Interpretation Comments

 

             PTT (test code = PTT) 37.6 s       22.9-35.8                 



[facility] DallasRbpplnrPIYZAHZQWV4704-19-18 11:45:00





             Test Item    Value        Reference Range Interpretation Comments

 

             INR (test code = INR) 1.08 1       0.85-1.17                 



[facility] CenterTegntxgPHFVUXBOMR4276-73-37 11:45:00





             Test Item    Value        Reference Range Interpretation Comments

 

             C-REACTIVE PROTEIN (test code = 13.1                               

    



             C-REACTIVE PROTEIN)                                        



[facility] CenterByqsfunZGFYEMRHIF9045-74-62 11:45:00





             Test Item    Value        Reference Range Interpretation Comments

 

             Prealbumin (test code = Prealbumin) 25.2         18.0-45.0         

        



[facility]2018-05-04 11:45:00





             Test Item    Value        Reference Range Interpretation Comments

 

             Magnesium Lvl (test code = Magnesium 2.3          1.8-2.4          

         



             Lvl)                                                



[facility]2018-05-04 11:45:00





             Test Item    Value        Reference Range Interpretation Comments

 

             Phosphorus (test code = Phosphorus) 3.5          2.5-4.5           

        



[facility] DallasTwzvlrlBAUQCHWPTK6794-67-93 11:45:00





             Test Item    Value        Reference Range Interpretation Comments

 

             PT (test code = PT) 14.0 s       12.0-14.7                 



[facility] DallasKfuvzuvIJTKPMJYDR0361-24-30 11:45:00





             Test Item    Value        Reference Range Interpretation Comments

 

             PTT (test code = PTT) 37.6 s       22.9-35.8                 



[facility] DallasWzxhfrrAKQTLOMGIO3396-28-68 11:45:00





             Test Item    Value        Reference Range Interpretation Comments

 

             INR (test code = INR) 1.08 1       0.85-1.17                 



[facility] CenterUhxxqvxUDCCJKVKUC3745-97-33 11:45:00





             Test Item    Value        Reference Range Interpretation Comments

 

             C-REACTIVE PROTEIN (test code = 13.1                               

    



             C-REACTIVE PROTEIN)                                        



[facility] CenterDdckxigDHWTBAIQSZ6222-24-17 11:45:00





             Test Item    Value        Reference Range Interpretation Comments

 

             Prealbumin (test code = Prealbumin) 25.2         18.0-45.0         

        



[facility]2018-05-04 11:45:00





             Test Item    Value        Reference Range Interpretation Comments

 

             Magnesium Lvl (test code = Magnesium 2.3          1.8-2.4          

         



             Lvl)                                                



[facility]2018-05-04 11:45:00





             Test Item    Value        Reference Range Interpretation Comments

 

             Phosphorus (test code = Phosphorus) 3.5          2.5-4.5           

        



[facility] DallasMessiwgGGHIXKKEHE4247-82-46 11:45:00





             Test Item    Value        Reference Range Interpretation Comments

 

             PT (test code = PT) 14.0 s       12.0-14.7                 



[facility] DallasSgxhiswYWYBILDLDW8690-89-85 11:45:00





             Test Item    Value        Reference Range Interpretation Comments

 

             PTT (test code = PTT) 37.6 s       22.9-35.8                 



[facility] DallasThisjgcTXYAVEJEOD9187-31-75 11:45:00





             Test Item    Value        Reference Range Interpretation Comments

 

             INR (test code = INR) 1.08 1       0.85-1.17                 



[facility] CenterFbhilqkGYENOWUUGC6490-61-75 11:45:00





             Test Item    Value        Reference Range Interpretation Comments

 

             C-REACTIVE PROTEIN (test code = 13.1                               

    



             C-REACTIVE PROTEIN)                                        



[facility] CenterMedpmpyUDPMQMPTDU3377-35-34 11:45:00





             Test Item    Value        Reference Range Interpretation Comments

 

             Prealbumin (test code = Prealbumin) 25.2         18.0-45.0         

        



[facility]2018-05-04 03:06:00





             Test Item    Value        Reference Range Interpretation Comments

 

             Lactic Acid Lvl (test code = Lactic 0.9          0.5-2.2           

        



             Acid Lvl)                                           



[facility]15[facility]





             Test Item    Value        Reference Range Interpretation Comments

 

             Sed Rate (test code = 5            See_Comment                [Auto

mated message] The



             Sed Rate)                                           system which ge

nerated this



                                                                 result transmit

huma



                                                                 reference range

: <=15. The



                                                                 reference range

 was not



                                                                 used to interpr

et this



                                                                 result as zayda

l/abnormal.



[id number]





             Test Item    Value        Reference Range Interpretation Comments

 

             C-REACTIVE PROTEIN (test code = 15.8                               

    



             C-REACTIVE PROTEIN)                                        



[facility]2018-05-04 03:06:00





             Test Item    Value        Reference Range Interpretation Comments

 

             Lactic Acid Lvl (test code = Lactic 0.9          0.5-2.2           

        



             Acid Lvl)                                           



[facility] DallasKabfhgcYIBTAMXLJV9726-36-62 03:06:00





             Test Item    Value        Reference Range Interpretation Comments

 

             Sed Rate (test code = 5            See_Comment                [Auto

mated message] The



             Sed Rate)                                           system which ge

nerated this



                                                                 result transmit

huma



                                                                 reference range

: <=15. The



                                                                 reference range

 was not



                                                                 used to interpr

et this



                                                                 result as zayda

l/abnormal.



[facility] CenterSeyvnmmCMLHOHIYKZ2987-22-97 03:06:00





             Test Item    Value        Reference Range Interpretation Comments

 

             C-REACTIVE PROTEIN (test code = 15.8                               

    



             C-REACTIVE PROTEIN)                                        



[facility]2018-05-04 03:06:00





             Test Item    Value        Reference Range Interpretation Comments

 

             Lactic Acid Lvl (test code = Lactic 0.9          0.5-2.2           

        



             Acid Lvl)                                           



[facility] DallasZzpkfmlMBUMGXRIOD6568-16-02 03:06:00





             Test Item    Value        Reference Range Interpretation Comments

 

             Sed Rate (test code = 5            See_Comment                [Auto

mated message] The



             Sed Rate)                                           system which ge

nerated this



                                                                 result transmit

huma



                                                                 reference range

: <=15. The



                                                                 reference range

 was not



                                                                 used to interpr

et this



                                                                 result as zayda

l/abnormal.



[facility] CenterWkxvtvtGOKVDOIXQD1169-34-51 03:06:00





             Test Item    Value        Reference Range Interpretation Comments

 

             C-REACTIVE PROTEIN (test code = 15.8                               

    



             C-REACTIVE PROTEIN)                                        



[facility]2018-05-04 03:06:00





             Test Item    Value        Reference Range Interpretation Comments

 

             Lactic Acid Lvl (test code = Lactic 0.9          0.5-2.2           

        



             Acid Lvl)                                           



[facility] DallasJvmeroiJGSHQPHMIY7905-63-25 03:06:00





             Test Item    Value        Reference Range Interpretation Comments

 

             Sed Rate (test code = 5            See_Comment                [Auto

mated message] The



             Sed Rate)                                           system which ge

nerated this



                                                                 result transmit

huma



                                                                 reference range

: <=15. The



                                                                 reference range

 was not



                                                                 used to interpr

et this



                                                                 result as zayda

l/abnormal.



[facility] CenterQjsqlkyNBRASBIQMC5741-83-04 03:06:00





             Test Item    Value        Reference Range Interpretation Comments

 

             C-REACTIVE PROTEIN (test code = 15.8                               

    



             C-REACTIVE PROTEIN)                                        



[facility]2018-05-04 03:06:00





             Test Item    Value        Reference Range Interpretation Comments

 

             Lactic Acid Lvl (test code = Lactic 0.9          0.5-2.2           

        



             Acid Lvl)                                           



[facility] DallasQzsotqmLQVEEUFRGU2470-92-49 03:06:00





             Test Item    Value        Reference Range Interpretation Comments

 

             Sed Rate (test code = 5            See_Comment                [Auto

mated message] The



             Sed Rate)                                           system which ge

nerated this



                                                                 result transmit

huma



                                                                 reference range

: <=15. The



                                                                 reference range

 was not



                                                                 used to interpr

et this



                                                                 result as zayda

l/abnormal.



[facility] CenterBkwknjmPIZKHSICBH7407-97-29 03:06:00





             Test Item    Value        Reference Range Interpretation Comments

 

             C-REACTIVE PROTEIN (test code = 15.8                               

    



             C-REACTIVE PROTEIN)                                        



[facility]2018-05-04 03:06:00





             Test Item    Value        Reference Range Interpretation Comments

 

             Lactic Acid Lvl (test code = Lactic 0.9          0.5-2.2           

        



             Acid Lvl)                                           



[facility] DallasAxvgndaABNWCXYFKF5414-93-54 03:06:00





             Test Item    Value        Reference Range Interpretation Comments

 

             Sed Rate (test code = 5            See_Comment                [Auto

mated message] The



             Sed Rate)                                           system which ge

nerated this



                                                                 result transmit

huma



                                                                 reference range

: <=15. The



                                                                 reference range

 was not



                                                                 used to interpr

et this



                                                                 result as zayda

l/abnormal.



[facility] CenterXhpiaihWHTMXQNNTT2820-98-86 03:06:00





             Test Item    Value        Reference Range Interpretation Comments

 

             C-REACTIVE PROTEIN (test code = 15.8                               

    



             C-REACTIVE PROTEIN)                                        



[facility]2018-05-04 03:06:00





             Test Item    Value        Reference Range Interpretation Comments

 

             Lactic Acid Lvl (test code = Lactic 0.9          0.5-2.2           

        



             Acid Lvl)                                           



[facility] DallasNhxpnquLXGQCFSJSA4693-55-86 03:06:00





             Test Item    Value        Reference Range Interpretation Comments

 

             Sed Rate (test code = 5            See_Comment                [Auto

mated message] The



             Sed Rate)                                           system which ge

nerated this



                                                                 result transmit

huma



                                                                 reference range

: <=15. The



                                                                 reference range

 was not



                                                                 used to interpr

et this



                                                                 result as zayda

l/abnormal.



[facility] CenterEqeathaQTJPYJISPD2883-89-35 03:06:00





             Test Item    Value        Reference Range Interpretation Comments

 

             C-REACTIVE PROTEIN (test code = 15.8                               

    



             C-REACTIVE PROTEIN)                                        



[facility] DallasSozaktsFOAZKJIVMQ5277-00-88 00:44:00





             Test Item    Value        Reference Range Interpretation Comments

 

             PT (test code = PT) 13.0 s       12.0-14.7                 



[facility] DallasGymvmnaEXBGJABJNG5578-20-43 00:44:00





             Test Item    Value        Reference Range Interpretation Comments

 

             INR (test code = INR) 0.98 1       0.85-1.17                 



[facility] DallasQdhlexqXGUVJIZPZD0874-35-72 00:44:00





             Test Item    Value        Reference Range Interpretation Comments

 

             PTT (test code = PTT) 33.2 s       22.9-35.8                 



[facility] DallasWbvtjthDXIUBLDJEH8761-25-96 00:44:00





             Test Item    Value        Reference Range Interpretation Comments

 

             PT (test code = PT) 13.0 s       12.0-14.7                 



[facility] DallasPgcukpfBDDDBCHOYF6056-40-82 00:44:00





             Test Item    Value        Reference Range Interpretation Comments

 

             INR (test code = INR) 0.98 1       0.85-1.17                 



[facility] DallasAxzywozJWPZKYAKVE3898-85-58 00:44:00





             Test Item    Value        Reference Range Interpretation Comments

 

             PTT (test code = PTT) 33.2 s       22.9-35.8                 



[facility] DallasIqsgnrtVJQAQPGQFN0808-51-80 00:44:00





             Test Item    Value        Reference Range Interpretation Comments

 

             PT (test code = PT) 13.0 s       12.0-14.7                 



[facility] DallasJfebocgLRJBDRHLNE8142-29-10 00:44:00





             Test Item    Value        Reference Range Interpretation Comments

 

             INR (test code = INR) 0.98 1       0.85-1.17                 



[facility] DallasUehwbfaJXZFQWSFUE3317-66-88 00:44:00





             Test Item    Value        Reference Range Interpretation Comments

 

             PTT (test code = PTT) 33.2 s       22.9-35.8                 



[facility] DallasDogplssLNQJEOPIIS7481-51-85 00:44:00





             Test Item    Value        Reference Range Interpretation Comments

 

             PT (test code = PT) 13.0 s       12.0-14.7                 



[facility] DallasIxlknzaDAHNUTYZRM5096-58-33 00:44:00





             Test Item    Value        Reference Range Interpretation Comments

 

             INR (test code = INR) 0.98 1       0.85-1.17                 



[facility] DallasIwfxhnvDATPRLEXRK6526-22-39 00:44:00





             Test Item    Value        Reference Range Interpretation Comments

 

             PTT (test code = PTT) 33.2 s       22.9-35.8                 



[facility] DallasRhrqujfMCPSNWMKWQ1036-74-18 00:44:00





             Test Item    Value        Reference Range Interpretation Comments

 

             PT (test code = PT) 13.0 s       12.0-14.7                 



[facility] DallasRloimpdTEROBQEAAE7283-01-70 00:44:00





             Test Item    Value        Reference Range Interpretation Comments

 

             INR (test code = INR) 0.98 1       0.85-1.17                 



[facility] DallasOzmwkmtOLMJKZCYZM1514-18-07 00:44:00





             Test Item    Value        Reference Range Interpretation Comments

 

             PTT (test code = PTT) 33.2 s       22.9-35.8                 



[facility] DallasSqnwkkbOFPQBBUEDL1079-93-88 00:44:00





             Test Item    Value        Reference Range Interpretation Comments

 

             PT (test code = PT) 13.0 s       12.0-14.7                 



[facility] DallasMadkpkhBUTQLRPGVV7400-28-75 00:44:00





             Test Item    Value        Reference Range Interpretation Comments

 

             INR (test code = INR) 0.98 1       0.85-1.17                 



[facility] DallasQrnyqnlFKVPYPZDVE9244-07-90 00:44:00





             Test Item    Value        Reference Range Interpretation Comments

 

             PTT (test code = PTT) 33.2 s       22.9-35.8                 



[facility] DallasQwmievuVAHOVXYJMW2396-49-21 00:44:00





             Test Item    Value        Reference Range Interpretation Comments

 

             PT (test code = PT) 13.0 s       12.0-14.7                 



[facility] DallasQlyvwraCALYYYUFFM6158-05-79 00:44:00





             Test Item    Value        Reference Range Interpretation Comments

 

             INR (test code = INR) 0.98 1       0.85-1.17                 



[facility] DallasMcqpgzwLWDMLIZBDL9189-55-77 00:44:00





             Test Item    Value        Reference Range Interpretation Comments

 

             PTT (test code = PTT) 33.2 s       22.9-35.8                 



[facility] [employer] [id number] 23:51:00





             Test Item    Value        Reference Range Interpretation Comments

 

             Antibody Scrn (test Negative (5/3/18 6:51                          

 



             code = Antibody Scrn) PM)                                    



[facility] [id number] 23:51:00





             Test Item    Value        Reference Range Interpretation Comments

 

             ABO/Rh (test code = ABO/Rh) AB POS                                 



[facility] [employer] [id number] 23:51:00





             Test Item    Value        Reference Range Interpretation Comments

 

             Antibody Scrn (test Negative (5/3/18 6:51                          

 



             code = Antibody Scrn) PM)                                    



[facility] [id number] 23:51:00





             Test Item    Value        Reference Range Interpretation Comments

 

             ABO/Rh (test code = ABO/Rh) AB POS                                 



[facility] [id number] 23:51:00





             Test Item    Value        Reference Range Interpretation Comments

 

             Antibody Scrn (test Negative (5/3/18 6:51                          

 



             code = Antibody Scrn) PM)                                    



[facility] [id number] 23:51:00





             Test Item    Value        Reference Range Interpretation Comments

 

             ABO/Rh (test code = ABO/Rh) AB POS                                 



[facility] [id number] 23:51:00





             Test Item    Value        Reference Range Interpretation Comments

 

             Antibody Scrn (test Negative (5/3/18 6:51                          

 



             code = Antibody Scrn) PM)                                    



[facility] [id number] 23:51:00





             Test Item    Value        Reference Range Interpretation Comments

 

             ABO/Rh (test code = ABO/Rh) AB POS                                 



[facility] [id number] 23:51:00





             Test Item    Value        Reference Range Interpretation Comments

 

             Antibody Scrn (test Negative (5/3/18 6:51                          

 



             code = Antibody Scrn) PM)                                    



[facility] [id number] 23:51:00





             Test Item    Value        Reference Range Interpretation Comments

 

             ABO/Rh (test code = ABO/Rh) AB POS                                 



[facility] [id number] 23:51:00





             Test Item    Value        Reference Range Interpretation Comments

 

             Antibody Scrn (test Negative (5/3/18 6:51                          

 



             code = Antibody Scrn) PM)                                    



[facility] [id number] 23:51:00





             Test Item    Value        Reference Range Interpretation Comments

 

             ABO/Rh (test code = ABO/Rh) AB POS                                 



[facility] [id number] 23:51:00





             Test Item    Value        Reference Range Interpretation Comments

 

             Antibody Scrn (test Negative (5/3/18 6:51                          

 



             code = Antibody Scrn) PM)                                    



[facility] [id number] 23:51:00





             Test Item    Value        Reference Range Interpretation Comments

 

             ABO/Rh (test code = ABO/Rh) AB POS                                 



[facility] AND [id number] 23:35:00





             Test Item    Value        Reference Range Interpretation Comments

 

             UA Urobilinogen (test code = UA 0.2          0.1-1.0               

    



             Urobilinogen)                                        



[facility] AND [id number] 23:35:00





             Test Item    Value        Reference Range Interpretation Comments

 

             UA Nitrite (test code Negative (5/3/18 6:35                        

   



             = UA Nitrite) PM)                                    



[facility] AND [id number] 23:35:00





             Test Item    Value        Reference Range Interpretation Comments

 

             UA Glucose (test code Negative (5/3/18 6:35                        

   



             = UA Glucose) PM)                                    



[facility] AND [id number] 23:35:00





             Test Item    Value        Reference Range Interpretation Comments

 

             UA Ketones (test code Negative *NA*(5/3/18                         

  



             = UA Ketones) 6:35 PM)                               



[facility] AND [id number] 23:35:00





             Test Item    Value        Reference Range Interpretation Comments

 

             UA Bili (test code = Negative *NA*(5/3/18                          

 



             UA Bili)     6:35 PM)                               



[facility] AND [id number] 23:35:00





             Test Item    Value        Reference Range Interpretation Comments

 

             UA Blood (test code = Trace *ABN*(5/3/18                           



             UA Blood)    6:35 PM)                               



[facility] AND [id number] 23:35:00





             Test Item    Value        Reference Range Interpretation Comments

 

             UA Leuk Est (test code Small *ABN*(5/3/18 6:35                     

      



             = UA Leuk Est) PM)                                    



[facility] AND [id number] 23:35:00





             Test Item    Value        Reference Range Interpretation Comments

 

             UA Spec Grav (test code = UA Spec 1.020 1                          

      



             Grav)                                               



[facility] AND [id number] 23:35:00





             Test Item    Value        Reference Range Interpretation Comments

 

             UA pH (test code = UA pH) 6.0 1        5.0-8.0                   



[facility] AND [id number] 23:35:00





             Test Item    Value        Reference Range Interpretation Comments

 

             UA Color (test code = Yellow *NA*(5/3/18 6:35                      

     



             UA Color)    PM)                                    



[facility] AND [id number] 23:35:00





             Test Item    Value        Reference Range Interpretation Comments

 

             UA Protein (test code = Trace *ABN*(5/3/18                         

  



             UA Protein)  6:35 PM)                               



[facility] AND [id number] 23:35:00





             Test Item    Value        Reference Range Interpretation Comments

 

             UA Turbidity (test code Slight Cloudy (5/3/18                      

     



             = UA Turbidity) 6:35 PM)                               



[facility] AND [id number] 23:35:00





             Test Item    Value        Reference Range Interpretation Comments

 

             UA Hyal Cast 0-2 (5/3/18 6:35 See_Comment                [Automated

 message]



             (test code = UA PM)                                    The system w

[facility]



             Hyal Cast)                                          generated this 

result



                                                                 transmitted ref

erence



                                                                 range: <=2. The



                                                                 reference range

 was



                                                                 not used to int

erpret



                                                                 this result as



                                                                 normal/abnormal

.



[facility] AND [id number] 23:35:00





             Test Item    Value        Reference Range Interpretation Comments

 

             UA Bacteria (test code = UA Occasional /HPF                        

   



             Bacteria)                                           



[facility] AND [id number] 23:35:00





             Test Item    Value        Reference Range Interpretation Comments

 

             UA RBC (test code 11-20 /HPF   See_Comment                [Automate

d message] The



             = UA RBC)                                           system which ge

nerated



                                                                 this result tra

nsmitted



                                                                 reference range

: <=2. The



                                                                 reference range

 was not



                                                                 used to interpr

et this



                                                                 result as



                                                                 normal/abnormal

.



[facility] AND [id number] 23:35:00





             Test Item    Value        Reference Range Interpretation Comments

 

             UA Sq Epi (test code = UA Sq Occasional /LPF                       

    



             Epi)                                                



[facility] AND [id number] 23:35:00





             Test Item    Value        Reference Range Interpretation Comments

 

             UA WBC (test code = UA WBC)  /HPF                            



[facility] AND [id number] 23:35:00





             Test Item    Value        Reference Range Interpretation Comments

 

             UA Urobilinogen (test code = UA 0.2          0.1-1.0               

    



             Urobilinogen)                                        



[facility] AND [id number] 23:35:00





             Test Item    Value        Reference Range Interpretation Comments

 

             UA Nitrite (test code Negative (5/3/18 6:35                        

   



             = UA Nitrite) PM)                                    



[facility] AND [id number] 23:35:00





             Test Item    Value        Reference Range Interpretation Comments

 

             UA Glucose (test code Negative (5/3/18 6:35                        

   



             = UA Glucose) PM)                                    



[facility] AND [id number] 23:35:00





             Test Item    Value        Reference Range Interpretation Comments

 

             UA Ketones (test code Negative *NA*(5/3/18                         

  



             = UA Ketones) 6:35 PM)                               



[facility] AND [id number] 23:35:00





             Test Item    Value        Reference Range Interpretation Comments

 

             UA Bili (test code = Negative *NA*(5/3/18                          

 



             UA Bili)     6:35 PM)                               



[facility] AND [id number] 23:35:00





             Test Item    Value        Reference Range Interpretation Comments

 

             UA Blood (test code = Trace *ABN*(5/3/18                           



             UA Blood)    6:35 PM)                               



[facility] AND [id number] 23:35:00





             Test Item    Value        Reference Range Interpretation Comments

 

             UA Leuk Est (test code Small *ABN*(5/3/18 6:35                     

      



             = UA Leuk Est) PM)                                    



[facility] AND [id number] 23:35:00





             Test Item    Value        Reference Range Interpretation Comments

 

             UA Spec Grav (test code = UA Spec 1.020 1                          

      



             Grav)                                               



[facility] AND [id number] 23:35:00





             Test Item    Value        Reference Range Interpretation Comments

 

             UA pH (test code = UA pH) 6.0 1        5.0-8.0                   



[facility] AND [id number] 23:35:00





             Test Item    Value        Reference Range Interpretation Comments

 

             UA Color (test code = Yellow *NA*(5/3/18 6:35                      

     



             UA Color)    PM)                                    



[facility] AND [id number] 23:35:00





             Test Item    Value        Reference Range Interpretation Comments

 

             UA Protein (test code = Trace *ABN*(5/3/18                         

  



             UA Protein)  6:35 PM)                               



[facility] AND [id number] 23:35:00





             Test Item    Value        Reference Range Interpretation Comments

 

             UA Turbidity (test code Slight Cloudy (5/3/18                      

     



             = UA Turbidity) 6:35 PM)                               



[facility] AND [id number] 23:35:00





             Test Item    Value        Reference Range Interpretation Comments

 

             UA Hyal Cast 0-2 (5/3/18 6:35 See_Comment                [Automated

 message]



             (test code = UA PM)                                    The system w

[facility]



             Hyal Cast)                                          generated this 

result



                                                                 transmitted ref

erence



                                                                 range: <=2. The



                                                                 reference range

 was



                                                                 not used to int

erpret



                                                                 this result as



                                                                 normal/abnormal

.



[facility] AND [id number] 23:35:00





             Test Item    Value        Reference Range Interpretation Comments

 

             UA Bacteria (test code = UA Occasional /HPF                        

   



             Bacteria)                                           



[facility] AND [id number] 23:35:00





             Test Item    Value        Reference Range Interpretation Comments

 

             UA RBC (test code 11-20 /HPF   See_Comment                [Automate

d message] The



             = UA RBC)                                           system which ge

nerated



                                                                 this result tra

nsmitted



                                                                 reference range

: <=2. The



                                                                 reference range

 was not



                                                                 used to interpr

et this



                                                                 result as



                                                                 normal/abnormal

.



[facility] AND [id number] 23:35:00





             Test Item    Value        Reference Range Interpretation Comments

 

             UA Sq Epi (test code = UA Sq Occasional /LPF                       

    



             Epi)                                                



[facility] AND [id number] 23:35:00





             Test Item    Value        Reference Range Interpretation Comments

 

             UA WBC (test code = UA WBC)  /HPF                            



[facility] AND [id number] 23:35:00





             Test Item    Value        Reference Range Interpretation Comments

 

             UA Urobilinogen (test code = UA 0.2          0.1-1.0               

    



             Urobilinogen)                                        



[facility] AND [id number] 23:35:00





             Test Item    Value        Reference Range Interpretation Comments

 

             UA Nitrite (test code Negative (5/3/18 6:35                        

   



             = UA Nitrite) PM)                                    



[facility] AND [id number] 23:35:00





             Test Item    Value        Reference Range Interpretation Comments

 

             UA Glucose (test code Negative (5/3/18 6:35                        

   



             = UA Glucose) PM)                                    



[facility] AND [id number] 23:35:00





             Test Item    Value        Reference Range Interpretation Comments

 

             UA Ketones (test code Negative *NA*(5/3/18                         

  



             = UA Ketones) 6:35 PM)                               



[facility] AND [id number] 23:35:00





             Test Item    Value        Reference Range Interpretation Comments

 

             UA Bili (test code = Negative *NA*(5/3/18                          

 



             UA Bili)     6:35 PM)                               



[facility] AND [id number] 23:35:00





             Test Item    Value        Reference Range Interpretation Comments

 

             UA Blood (test code = Trace *ABN*(5/3/18                           



             UA Blood)    6:35 PM)                               



[facility] AND [id number] 23:35:00





             Test Item    Value        Reference Range Interpretation Comments

 

             UA Leuk Est (test code Small *ABN*(5/3/18 6:35                     

      



             = UA Leuk Est) PM)                                    



[facility] AND [id number] 23:35:00





             Test Item    Value        Reference Range Interpretation Comments

 

             UA Spec Grav (test code = UA Spec 1.020 1                          

      



             Grav)                                               



[facility] AND [id number] 23:35:00





             Test Item    Value        Reference Range Interpretation Comments

 

             UA pH (test code = UA pH) 6.0 1        5.0-8.0                   



[facility] [id number] 23:35:00





             Test Item    Value        Reference Range Interpretation Comments

 

             UA Color (test code = Yellow *NA*(5/3/18 6:35                      

     



             UA Color)    PM)                                    



[facility] AND [id number] 23:35:00





             Test Item    Value        Reference Range Interpretation Comments

 

             UA Protein (test code = Trace *ABN*(5/3/18                         

  



             UA Protein)  6:35 PM)                               



[facility] AND [id number] 23:35:00





             Test Item    Value        Reference Range Interpretation Comments

 

             UA Turbidity (test code Slight Cloudy (5/3/18                      

     



             = UA Turbidity) 6:35 PM)                               



[facility] AND [id number] 23:35:00





             Test Item    Value        Reference Range Interpretation Comments

 

             UA Hyal Cast 0-2 (5/3/18 6:35 See_Comment                [Automated

 message]



             (test code = UA PM)                                    The system w

[facility]



             Hyal Cast)                                          generated this 

result



                                                                 transmitted ref

erence



                                                                 range: <=2. The



                                                                 reference range

 was



                                                                 not used to int

erpret



                                                                 this result as



                                                                 normal/abnormal

.



[facility] [id number] 23:35:00





             Test Item    Value        Reference Range Interpretation Comments

 

             UA Bacteria (test code = UA Occasional /HPF                        

   



             Bacteria)                                           



[facility] AND [id number] 23:35:00





             Test Item    Value        Reference Range Interpretation Comments

 

             UA RBC (test code 11-20 /HPF   See_Comment                [Automate

d message] The



             = UA RBC)                                           system which ge

nerated



                                                                 this result tra

nsmitted



                                                                 reference range

: <=2. The



                                                                 reference range

 was not



                                                                 used to interpr

et this



                                                                 result as



                                                                 normal/abnormal

.



[facility] [id number] 23:35:00





             Test Item    Value        Reference Range Interpretation Comments

 

             UA Sq Epi (test code = UA Sq Occasional /LPF                       

    



             Epi)                                                



[facility] AND [id number] 23:35:00





             Test Item    Value        Reference Range Interpretation Comments

 

             UA WBC (test code = UA WBC)  /HPF                            



[facility] AND  23:35:00





             Test Item    Value        Reference Range Interpretation Comments

 

             UA Urobilinogen (test code = UA 0.2          0.1-1.0               

    



             Urobilinogen)                                        



[facility] AND  23:35:00





             Test Item    Value        Reference Range Interpretation Comments

 

             UA Nitrite (test code Negative (5/3/18 6:35                        

   



             = UA Nitrite) PM)                                    



[facility] AND [id number] 23:35:00





             Test Item    Value        Reference Range Interpretation Comments

 

             UA Glucose (test code Negative (5/3/18 6:35                        

   



             = UA Glucose) PM)                                    



[facility] AND [id number] 23:35:00





             Test Item    Value        Reference Range Interpretation Comments

 

             UA Ketones (test code Negative *NA*(5/3/18                         

  



             = UA Ketones) 6:35 PM)                               



[facility] AND [id number] 23:35:00





             Test Item    Value        Reference Range Interpretation Comments

 

             UA Bili (test code = Negative *NA*(5/3/18                          

 



             UA Bili)     6:35 PM)                               



[facility] AND [id number] 23:35:00





             Test Item    Value        Reference Range Interpretation Comments

 

             UA Blood (test code = Trace *ABN*(5/3/18                           



             UA Blood)    6:35 PM)                               



[facility] AND [id number] 23:35:00





             Test Item    Value        Reference Range Interpretation Comments

 

             UA Leuk Est (test code Small *ABN*(5/3/18 6:35                     

      



             = UA Leuk Est) PM)                                    



[facility] AND [id number] 23:35:00





             Test Item    Value        Reference Range Interpretation Comments

 

             UA Spec Grav (test code = UA Spec 1.020 1                          

      



             Grav)                                               



[facility] AND [id number] 23:35:00





             Test Item    Value        Reference Range Interpretation Comments

 

             UA pH (test code = UA pH) 6.0 1        5.0-8.0                   



[facility] AND [id number] 23:35:00





             Test Item    Value        Reference Range Interpretation Comments

 

             UA Color (test code = Yellow *NA*(5/3/18 6:35                      

     



             UA Color)    PM)                                    



[facility] AND [id number] 23:35:00





             Test Item    Value        Reference Range Interpretation Comments

 

             UA Protein (test code = Trace *ABN*(5/3/18                         

  



             UA Protein)  6:35 PM)                               



[facility] AND [id number] 23:35:00





             Test Item    Value        Reference Range Interpretation Comments

 

             UA Turbidity (test code Slight Cloudy (5/3/18                      

     



             = UA Turbidity) 6:35 PM)                               



[facility] AND [id number] 23:35:00





             Test Item    Value        Reference Range Interpretation Comments

 

             UA Hyal Cast 0-2 (5/3/18 6:35 See_Comment                [Automated

 message]



             (test code = UA PM)                                    The system w

[facility]



             Hyal Cast)                                          generated this 

result



                                                                 transmitted ref

erence



                                                                 range: <=2. The



                                                                 reference range

 was



                                                                 not used to int

erpret



                                                                 this result as



                                                                 normal/abnormal

.



[facility] AND [id number] 23:35:00





             Test Item    Value        Reference Range Interpretation Comments

 

             UA Bacteria (test code = UA Occasional /HPF                        

   



             Bacteria)                                           



[facility] AND [id number] 23:35:00





             Test Item    Value        Reference Range Interpretation Comments

 

             UA RBC (test code 11-20 /HPF   See_Comment                [Automate

d message] The



             = UA RBC)                                           system which ge

nerated



                                                                 this result tra

nsmitted



                                                                 reference range

: <=2. The



                                                                 reference range

 was not



                                                                 used to interpr

et this



                                                                 result as



                                                                 normal/abnormal

.



[facility] AND [id number] 23:35:00





             Test Item    Value        Reference Range Interpretation Comments

 

             UA Sq Epi (test code = UA Sq Occasional /LPF                       

    



             Epi)                                                



[facility] AND [id number] 23:35:00





             Test Item    Value        Reference Range Interpretation Comments

 

             UA WBC (test code = UA WBC)  /HPF                            



[facility] AND [id number] 23:35:00





             Test Item    Value        Reference Range Interpretation Comments

 

             UA Urobilinogen (test code = UA 0.2          0.1-1.0               

    



             Urobilinogen)                                        



[facility] AND [id number] 23:35:00





             Test Item    Value        Reference Range Interpretation Comments

 

             UA Nitrite (test code Negative (5/3/18 6:35                        

   



             = UA Nitrite) PM)                                    



[facility] AND [id number] 23:35:00





             Test Item    Value        Reference Range Interpretation Comments

 

             UA Glucose (test code Negative (5/3/18 6:35                        

   



             = UA Glucose) PM)                                    



[facility] AND [id number] 23:35:00





             Test Item    Value        Reference Range Interpretation Comments

 

             UA Ketones (test code Negative *NA*(5/3/18                         

  



             = UA Ketones) 6:35 PM)                               



[facility] AND [id number] 23:35:00





             Test Item    Value        Reference Range Interpretation Comments

 

             UA Bili (test code = Negative *NA*(5/3/18                          

 



             UA Bili)     6:35 PM)                               



[facility] AND  23:35:00





             Test Item    Value        Reference Range Interpretation Comments

 

             UA Blood (test code = Trace *ABN*(5/3/18                           



             UA Blood)    6:35 PM)                               



[facility] AND [id number] 23:35:00





             Test Item    Value        Reference Range Interpretation Comments

 

             UA Leuk Est (test code Small *ABN*(5/3/18 6:35                     

      



             = UA Leuk Est) PM)                                    



[facility] AND  23:35:00





             Test Item    Value        Reference Range Interpretation Comments

 

             UA Spec Grav (test code = UA Spec 1.020 1                          

      



             Grav)                                               



[facility] AND  23:35:00





             Test Item    Value        Reference Range Interpretation Comments

 

             UA pH (test code = UA pH) 6.0 1        5.0-8.0                   



[facility] AND  23:35:00





             Test Item    Value        Reference Range Interpretation Comments

 

             UA Color (test code = Yellow *NA*(5/3/18 6:35                      

     



             UA Color)    PM)                                    



[facility] AND  23:35:00





             Test Item    Value        Reference Range Interpretation Comments

 

             UA Protein (test code = Trace *ABN*(5/3/18                         

  



             UA Protein)  6:35 PM)                               



[facility] AND [id number] 23:35:00





             Test Item    Value        Reference Range Interpretation Comments

 

             UA Turbidity (test code Slight Cloudy (5/3/18                      

     



             = UA Turbidity) 6:35 PM)                               



[facility] AND [id number] 23:35:00





             Test Item    Value        Reference Range Interpretation Comments

 

             UA Hyal Cast 0-2 (5/3/18 6:35 See_Comment                [Automated

 message]



             (test code = UA PM)                                    The system w

[facility]



             Hyal Cast)                                          generated this 

result



                                                                 transmitted ref

erence



                                                                 range: <=2. The



                                                                 reference range

 was



                                                                 not used to int

erpret



                                                                 this result as



                                                                 normal/abnormal

.



[facility] [id number] 23:35:00





             Test Item    Value        Reference Range Interpretation Comments

 

             UA Bacteria (test code = UA Occasional /HPF                        

   



             Bacteria)                                           



[facility] [id number] 23:35:00





             Test Item    Value        Reference Range Interpretation Comments

 

             UA RBC (test code 11-20 /HPF   See_Comment                [Automate

d message] The



             = UA RBC)                                           system which ge

nerated



                                                                 this result tra

nsmitted



                                                                 reference range

: <=2. The



                                                                 reference range

 was not



                                                                 used to interpr

et this



                                                                 result as



                                                                 normal/abnormal

.



[facility] [id number] 23:35:00





             Test Item    Value        Reference Range Interpretation Comments

 

             UA Sq Epi (test code = UA Sq Occasional /LPF                       

    



             Epi)                                                



[facility] [id number] 23:35:00





             Test Item    Value        Reference Range Interpretation Comments

 

             UA WBC (test code = UA WBC)  /HPF                            



[facility] [id number] 23:35:00





             Test Item    Value        Reference Range Interpretation Comments

 

             UA Urobilinogen (test code = UA 0.2          0.1-1.0               

    



             Urobilinogen)                                        



[facility] [id number] 23:35:00





             Test Item    Value        Reference Range Interpretation Comments

 

             UA Nitrite (test code Negative (5/3/18 6:35                        

   



             = UA Nitrite) PM)                                    



[facility] [id number] 23:35:00





             Test Item    Value        Reference Range Interpretation Comments

 

             UA Glucose (test code Negative (5/3/18 6:35                        

   



             = UA Glucose) PM)                                    



[facility] [id number] 23:35:00





             Test Item    Value        Reference Range Interpretation Comments

 

             UA Ketones (test code Negative *NA*(5/3/18                         

  



             = UA Ketones) 6:35 PM)                               



[facility] [id number] 23:35:00





             Test Item    Value        Reference Range Interpretation Comments

 

             UA Bili (test code = Negative *NA*(5/3/18                          

 



             UA Bili)     6:35 PM)                               



[facility] [id number] 23:35:00





             Test Item    Value        Reference Range Interpretation Comments

 

             UA Blood (test code = Trace *ABN*(5/3/18                           



             UA Blood)    6:35 PM)                               



[facility] [id number] 23:35:00





             Test Item    Value        Reference Range Interpretation Comments

 

             UA Leuk Est (test code Small *ABN*(5/3/18 6:35                     

      



             = UA Leuk Est) PM)                                    



[facility] [id number] 23:35:00





             Test Item    Value        Reference Range Interpretation Comments

 

             UA Spec Grav (test code = UA Spec 1.020 1                          

      



             Grav)                                               



[facility] [id number] 23:35:00





             Test Item    Value        Reference Range Interpretation Comments

 

             UA pH (test code = UA pH) 6.0 1        5.0-8.0                   



[facility] [id number] 23:35:00





             Test Item    Value        Reference Range Interpretation Comments

 

             UA Color (test code = Yellow *NA*(5/3/18 6:35                      

     



             UA Color)    PM)                                    



[facility] [id number] 23:35:00





             Test Item    Value        Reference Range Interpretation Comments

 

             UA Protein (test code = Trace *ABN*(5/3/18                         

  



             UA Protein)  6:35 PM)                               



[facility] [id number] 23:35:00





             Test Item    Value        Reference Range Interpretation Comments

 

             UA Turbidity (test code Slight Cloudy (5/3/18                      

     



             = UA Turbidity) 6:35 PM)                               



[facility] [id number] 23:35:00





             Test Item    Value        Reference Range Interpretation Comments

 

             UA Hyal Cast 0-2 (5/3/18 6:35 See_Comment                [Automated

 message]



             (test code = UA PM)                                    The system w

[facility]



             Hyal Cast)                                          generated this 

result



                                                                 transmitted ref

erence



                                                                 range: <=2. The



                                                                 reference range

 was



                                                                 not used to int

erpret



                                                                 this result as



                                                                 normal/abnormal

.



[facility] [id number] 23:35:00





             Test Item    Value        Reference Range Interpretation Comments

 

             UA Bacteria (test code = UA Occasional /HPF                        

   



             Bacteria)                                           



[facility] [id number] 23:35:00





             Test Item    Value        Reference Range Interpretation Comments

 

             UA RBC (test code 11-20 /HPF   See_Comment                [Automate

d message] The



             = UA RBC)                                           system which ge

nerated



                                                                 this result tra

nsmitted



                                                                 reference range

: <=2. The



                                                                 reference range

 was not



                                                                 used to interpr

et this



                                                                 result as



                                                                 normal/abnormal

.



[facility] [id number] 23:35:00





             Test Item    Value        Reference Range Interpretation Comments

 

             UA Sq Epi (test code = UA Sq Occasional /LPF                       

    



             Epi)                                                



[facility] [id number] 23:35:00





             Test Item    Value        Reference Range Interpretation Comments

 

             UA WBC (test code = UA WBC)  /HPF                            



[facility] [id number] 23:35:00





             Test Item    Value        Reference Range Interpretation Comments

 

             UA Urobilinogen (test code = UA 0.2          0.1-1.0               

    



             Urobilinogen)                                        



[facility] [id number] 23:35:00





             Test Item    Value        Reference Range Interpretation Comments

 

             UA Nitrite (test code Negative (5/3/18 6:35                        

   



             = UA Nitrite) PM)                                    



[facility] [id number] 23:35:00





             Test Item    Value        Reference Range Interpretation Comments

 

             UA Glucose (test code Negative (5/3/18 6:35                        

   



             = UA Glucose) PM)                                    



[facility] [id number] 23:35:00





             Test Item    Value        Reference Range Interpretation Comments

 

             UA Ketones (test code Negative *NA*(5/3/18                         

  



             = UA Ketones) 6:35 PM)                               



[facility] [id number] 23:35:00





             Test Item    Value        Reference Range Interpretation Comments

 

             UA Bili (test code = Negative *NA*(5/3/18                          

 



             UA Bili)     6:35 PM)                               



[facility] [id number] 23:35:00





             Test Item    Value        Reference Range Interpretation Comments

 

             UA Blood (test code = Trace *ABN*(5/3/18                           



             UA Blood)    6:35 PM)                               



[facility] [id number] 23:35:00





             Test Item    Value        Reference Range Interpretation Comments

 

             UA Leuk Est (test code Small *ABN*(5/3/18 6:35                     

      



             = UA Leuk Est) PM)                                    



[facility] [id number] 23:35:00





             Test Item    Value        Reference Range Interpretation Comments

 

             UA Spec Grav (test code = UA Spec 1.020 1                          

      



             Grav)                                               



[facility] [id number] 23:35:00





             Test Item    Value        Reference Range Interpretation Comments

 

             UA pH (test code = UA pH) 6.0 1        5.0-8.0                   



[facility] [id number] 23:35:00





             Test Item    Value        Reference Range Interpretation Comments

 

             UA Color (test code = Yellow *NA*(5/3/18 6:35                      

     



             UA Color)    PM)                                    



[facility] [id number] 23:35:00





             Test Item    Value        Reference Range Interpretation Comments

 

             UA Protein (test code = Trace *ABN*(5/3/18                         

  



             UA Protein)  6:35 PM)                               



[facility] [id number] 23:35:00





             Test Item    Value        Reference Range Interpretation Comments

 

             UA Turbidity (test code Slight Cloudy (5/3/18                      

     



             = UA Turbidity) 6:35 PM)                               



[facility] [id number] 23:35:00





             Test Item    Value        Reference Range Interpretation Comments

 

             UA Hyal Cast 0-2 (5/3/18 6:35 See_Comment                [Automated

 message]



             (test code = UA PM)                                    The system w

[facility]



             Hyal Cast)                                          generated this 

result



                                                                 transmitted ref

erence



                                                                 range: <=2. The



                                                                 reference range

 was



                                                                 not used to int

erpret



                                                                 this result as



                                                                 normal/abnormal

.



[facility] [id number] 23:35:00





             Test Item    Value        Reference Range Interpretation Comments

 

             UA Bacteria (test code = UA Occasional /HPF                        

   



             Bacteria)                                           



[facility] [id number] 23:35:00





             Test Item    Value        Reference Range Interpretation Comments

 

             UA RBC (test code 11-20 /HPF   See_Comment                [Automate

d message] The



             = UA RBC)                                           system which ge

nerated



                                                                 this result tra

nsmitted



                                                                 reference range

: <=2. The



                                                                 reference range

 was not



                                                                 used to interpr

et this



                                                                 result as



                                                                 normal/abnormal

.



[facility] [id number] 23:35:00





             Test Item    Value        Reference Range Interpretation Comments

 

             UA Sq Epi (test code = UA Sq Occasional /LPF                       

    



             Epi)                                                



[facility] [id number] 23:35:00





             Test Item    Value        Reference Range Interpretation Comments

 

             UA WBC (test code = UA WBC)  /HPF                            



[id number]





             Test Item    Value        Reference Range Interpretation Comments

 

             Basophils # (test code 0.1          See_Comment                [Aut

omated message] The



             = Basophils #)                                        system which 

generated



                                                                 this result tra

nsmitted



                                                                 reference range

: <=0.2.



                                                                 The reference r

zhen was



                                                                 not used to int

erpret



                                                                 this result as



                                                                 normal/abnormal

.



[id number] 23:20:00





             Test Item    Value        Reference Range Interpretation Comments

 

             Polychrom (test code = Polychrom) Slight                           

      



[facility] [id number] 23:20:00





             Test Item    Value        Reference Range Interpretation Comments

 

             Plt Morph (test code = Normal (5/3/18 6:20 PM)                     

      



             Plt Morph)                                          



[facility] [id number] 23:20:00





             Test Item    Value        Reference Range Interpretation Comments

 

             Basophils # (test code 0.1          See_Comment                [Aut

omated message] The



             = Basophils #)                                        system which 

generated



                                                                 this result tra

nsmitted



                                                                 reference range

: <=0.2.



                                                                 The reference r

zhen was



                                                                 not used to int

erpret



                                                                 this result as



                                                                 normal/abnormal

.



[facility] [id number] 23:20:00





             Test Item    Value        Reference Range Interpretation Comments

 

             Polychrom (test code = Polychrom) Slight                           

      



[facility] [id number] 23:20:00





             Test Item    Value        Reference Range Interpretation Comments

 

             Plt Morph (test code = Normal (5/3/18 6:20 PM)                     

      



             Plt Morph)                                          



[facility] [id number] 23:20:00





             Test Item    Value        Reference Range Interpretation Comments

 

             Basophils # (test code 0.1          See_Comment                [Aut

omated message] The



             = Basophils #)                                        system which 

generated



                                                                 this result tra

nsmitted



                                                                 reference range

: <=0.2.



                                                                 The reference r

zhen was



                                                                 not used to int

erpret



                                                                 this result as



                                                                 normal/abnormal

.



[facility] [id number] 23:20:00





             Test Item    Value        Reference Range Interpretation Comments

 

             Polychrom (test code = Polychrom) Slight                           

      



[facility] [id number] 23:20:00





             Test Item    Value        Reference Range Interpretation Comments

 

             Plt Morph (test code = Normal (5/3/18 6:20 PM)                     

      



             Plt Morph)                                          



[facility] [id number] 23:20:00





             Test Item    Value        Reference Range Interpretation Comments

 

             Basophils # (test code 0.1          See_Comment                [Aut

omated message] The



             = Basophils #)                                        system which 

generated



                                                                 this result tra

nsmitted



                                                                 reference range

: <=0.2.



                                                                 The reference r

zhen was



                                                                 not used to int

erpret



                                                                 this result as



                                                                 normal/abnormal

.



[facility] D[id number] 23:20:00





             Test Item    Value        Reference Range Interpretation Comments

 

             Polychrom (test code = Polychrom) Slight                           

      



[facility] [id number] 23:20:00





             Test Item    Value        Reference Range Interpretation Comments

 

             Plt Morph (test code = Normal (5/3/18 6:20 PM)                     

      



             Plt Morph)                                          



[facility] [id number] 23:20:00





             Test Item    Value        Reference Range Interpretation Comments

 

             Basophils # (test code 0.1          See_Comment                [Aut

omated message] The



             = Basophils #)                                        system which 

generated



                                                                 this result tra

nsmitted



                                                                 reference range

: <=0.2.



                                                                 The reference r

zhen was



                                                                 not used to int

erpret



                                                                 this result as



                                                                 normal/abnormal

.



[facility] [id number] 23:20:00





             Test Item    Value        Reference Range Interpretation Comments

 

             Polychrom (test code = Polychrom) Slight                           

      



[facility] [id number] 23:20:00





             Test Item    Value        Reference Range Interpretation Comments

 

             Plt Morph (test code = Normal (5/3/18 6:20 PM)                     

      



             Plt Morph)                                          



[facility] [id number] 23:20:00





             Test Item    Value        Reference Range Interpretation Comments

 

             Basophils # (test code 0.1          See_Comment                [Aut

omated message] The



             = Basophils #)                                        system which 

generated



                                                                 this result tra

nsmitted



                                                                 reference range

: <=0.2.



                                                                 The reference r

zhen was



                                                                 not used to int

erpret



                                                                 this result as



                                                                 normal/abnormal

.



[facility] [id number] 23:20:00





             Test Item    Value        Reference Range Interpretation Comments

 

             Polychrom (test code = Polychrom) Slight                           

      



[facility] D[id number] 23:20:00





             Test Item    Value        Reference Range Interpretation Comments

 

             Plt Morph (test code = Normal (5/3/18 6:20 PM)                     

      



             Plt Morph)                                          



[facility] [id number] 23:20:00





             Test Item    Value        Reference Range Interpretation Comments

 

             Basophils # (test code 0.1          See_Comment                [Aut

omated message] The



             = Basophils #)                                        system which 

generated



                                                                 this result tra

nsmitted



                                                                 reference range

: <=0.2.



                                                                 The reference r

zhen was



                                                                 not used to int

erpret



                                                                 this result as



                                                                 normal/abnormal

.



[facility] D[id number] 23:20:00





             Test Item    Value        Reference Range Interpretation Comments

 

             Polychrom (test code = Polychrom) Slight                           

      



[id number]00





             Test Item    Value        Reference Range Interpretation Comments

 

             Plt Morph (test code = Normal (5/3/18 6:20 PM)                     

      



             Plt Morph)                                          



[facility]2017-06-16 16:22:00





             Test Item    Value        Reference Range Interpretation Comments

 

             CULTURE (BEAKER) (test No growth in 5 days                         

  



             code = 1095)                                        



BLOOD [id number] 16:22:00





             Test Item    Value        Reference Range Interpretation Comments

 

             CULTURE (BEAKER) (test No growth in 5 days                         

  



             code = 1095)                                        



(MANUAL DIFFERENTIAL)2017 22:29:00





             Test Item    Value        Reference Range Interpretation Comments

 

             TOTAL COUNTED (BEAKER) (test code =                                

        



             1351)                                               

 

             WBC MORPHOLOGY (BEAKER) (test code = Normal                        

         



             487)                                                

 

             PLT MORPHOLOGY (BEAKER) (test code = Normal                        

         



             486)                                                

 

             RBC MORPHOLOGY (BEAKER) (test code = Normal                        

         



             762)                                                



CBC W/PLT COUNT &amp; AUTO [id number] 22:28:00





             Test Item    Value        Reference Range Interpretation Comments

 

             WHITE BLOOD CELL COUNT (BEAKER) 7.3 K/ L     4.0-10.0              

    



             (test code = 775)                                        

 

             RED BLOOD CELL COUNT (BEAKER) 5.09 M/ L    4.20-5.80               

  



             (test code = 761)                                        

 

             HEMOGLOBIN (BEAKER) (test code = 13.0 GM/DL   13.0-16.8            

     



             410)                                                

 

             HEMATOCRIT (BEAKER) (test code = 42.3 %       40.0-50.0            

     



             411)                                                

 

             MEAN CORPUSCULAR VOLUME (BEAKER) 83.0 fL      82.0-98.0            

     



             (test code = 753)                                        

 

             MEAN CORPUSCULAR HEMOGLOBIN 25.6 pg      27.0-33.0    L            



             (BEAKER) (test code = 751)                                        

 

             MEAN CORPUSCULAR HEMOGLOBIN CONC 30.8 GM/DL   32.0-36.0    L       

     



             (BEAKER) (test code = 752)                                        

 

             RED CELL DISTRIBUTION WIDTH 18.0 %       10.3-14.2    H            



             (BEAKER) (test code = 412)                                        

 

             PLATELET COUNT (BEAKER) (test 331 K/CU MM  150-430                 

  



             code = 756)                                         

 

             MEAN PLATELET VOLUME (BEAKER) 8.5 fL       6.5-10.5                

  



             (test code = 754)                                        

 

             NUCLEATED RED BLOOD CELLS 0 /100 WBC   0-0                       



             (BEAKER) (test code = 413)                                        

 

             NEUTROPHILS RELATIVE PERCENT 65 %                                  

 



             (BEAKER) (test code = 429)                                        

 

             LYMPHOCYTES RELATIVE PERCENT 23 %                                  

 



             (BEAKER) (test code = 430)                                        

 

             MONOCYTES RELATIVE PERCENT 8 %                                    



             (BEAKER) (test code = 431)                                        

 

             EOSINOPHILS RELATIVE PERCENT 3 %                                   

 



             (BEAKER) (test code = 432)                                        

 

             BASOPHILS RELATIVE PERCENT 1 %                                    



             (BEAKER) (test code = 437)                                        

 

             NEUTROPHILS ABSOLUTE COUNT 4.75 K/ L    1.80-8.00                 



             (BEAKER) (test code = 670)                                        

 

             LYMPHOCYTES ABSOLUTE COUNT 1.68 K/ L    1.48-4.50                 



             (BEAKER) (test code = 414)                                        

 

             MONOCYTES ABSOLUTE COUNT (BEAKER) 0.55 K/ L    0.00-1.30           

      



             (test code = 415)                                        

 

             EOSINOPHILS ABSOLUTE COUNT 0.24 K/ L    0.00-0.50                 



             (BEAKER) (test code = 416)                                        

 

             BASOPHILS ABSOLUTE COUNT (BEAKER) 0.05 K/ L    0.00-0.20           

      



             (test code = 417)                                        



[id number] METABOLIC [id number] 11:11:00





             Test Item    Value        Reference Range Interpretation Comments

 

             SODIUM (BEAKER) 138 meq/L    136-145                   



             (test code = 381)                                        

 

             POTASSIUM (BEAKER) 4.0 meq/L    3.5-5.1                   



             (test code = 379)                                        

 

             CHLORIDE (BEAKER) 108 meq/L           H            



             (test code = 382)                                        

 

             CO2 (BEAKER) (test 23 meq/L     22-29                     



             code = 355)                                         

 

             BLOOD UREA NITROGEN 11 mg/dL     7-21                      



             (BEAKER) (test code                                        



             = 354)                                              

 

             CREATININE (BEAKER) 0.74 mg/dL   0.57-1.25                 



             (test code = 358)                                        

 

             GLUCOSE RANDOM 124 mg/dL           H            



             (BEAKER) (test code                                        



             = 652)                                              

 

             CALCIUM (BEAKER) 8.9 mg/dL    8.4-10.2                  



             (test code = 697)                                        

 

             EGFR (BEAKER) (test 150 mL/min/1.73                           ESTIM

ATED GFR IS



             code = 1092) sq m                                   NOT AS ACCURATE

 AS



                                                                 CREATININE



                                                                 CLEARANCE IN



                                                                 PREDICTING



                                                                 GLOMERULAR



                                                                 FILTRATION RATE

.



                                                                 ESTIMATED GFR I

S



                                                                 NOT APPLICABLE 

FOR



                                                                 DIALYSIS PATIEN

TS.



URINE [id number] 09:56:00





             Test Item    Value        Reference Range Interpretation Comments

 

             CULTURE (BEAKER) (test <10,000 col/mL skin                         

  



             code = 1095) jaime                                  



COMPREHENSIVE METABOLIC [id number] 08:49:00





             Test Item    Value        Reference Range Interpretation Comments

 

             TOTAL PROTEIN 7.3 gm/dL    6.0-8.3                   



             (BEAKER) (test code =                                        



             770)                                                

 

             ALBUMIN (BEAKER) 3.3 g/dL     3.5-5.0      L            



             (test code = 1145)                                        

 

             ALKALINE PHOSPHATASE 89 U/L                           



             (BEAKER) (test code =                                        



             346)                                                

 

             BILIRUBIN TOTAL 1.4 mg/dL    0.2-1.2      H            



             (BEAKER) (test code =                                        



             377)                                                

 

             SODIUM (BEAKER) (test 137 meq/L    136-145                   



             code = 381)                                         

 

             POTASSIUM (BEAKER) 3.7 meq/L    3.5-5.1                   



             (test code = 379)                                        

 

             CHLORIDE (BEAKER) 108 meq/L           H            



             (test code = 382)                                        

 

             CO2 (BEAKER) (test 17 meq/L     22-29        L            



             code = 355)                                         

 

             BLOOD UREA NITROGEN 12 mg/dL     7-21                      



             (BEAKER) (test code =                                        



             354)                                                

 

             CREATININE (BEAKER) 0.78 mg/dL   0.57-1.25                 



             (test code = 358)                                        

 

             GLUCOSE RANDOM 119 mg/dL           H            



             (BEAKER) (test code =                                        



             652)                                                

 

             CALCIUM (BEAKER) 8.6 mg/dL    8.4-10.2                  



             (test code = 697)                                        

 

             AST (SGOT) (BEAKER) 13 U/L       5-34                      



             (test code = 353)                                        

 

             ALT (SGPT) (BEAKER) 28 U/L       6-55                      



             (test code = 347)                                        

 

             EGFR (BEAKER) (test 141                                    ESTIMATE

D GFR IS



             code = 1092) mL/min/1.73 sq                           NOT AS ACCURA

TE AS



                          m                                      CREATININE



                                                                 CLEARANCE IN



                                                                 PREDICTING



                                                                 GLOMERULAR



                                                                 FILTRATION RATE

.



                                                                 ESTIMATED GFR I

S



                                                                 NOT APPLICABLE 

FOR



                                                                 DIALYSIS PATIEN

TS.



CBC W/PLT COUNT &amp; AUTO [id number] 08:46:00





             Test Item    Value        Reference Range Interpretation Comments

 

             WHITE BLOOD CELL COUNT (BEAKER) 9.4 K/ L     4.0-10.0              

    



             (test code = 775)                                        

 

             RED BLOOD CELL COUNT (BEAKER) 4.79 M/ L    4.20-5.80               

  



             (test code = 761)                                        

 

             HEMOGLOBIN (BEAKER) (test code = 12.8 GM/DL   13.0-16.8    L       

     



             410)                                                

 

             HEMATOCRIT (BEAKER) (test code = 40.0 %       40.0-50.0            

     



             411)                                                

 

             MEAN CORPUSCULAR VOLUME (BEAKER) 83.4 fL      82.0-98.0            

     



             (test code = 753)                                        

 

             MEAN CORPUSCULAR HEMOGLOBIN 26.7 pg      27.0-33.0    L            



             (BEAKER) (test code = 751)                                        

 

             MEAN CORPUSCULAR HEMOGLOBIN CONC 32.0 GM/DL   32.0-36.0            

     



             (BEAKER) (test code = 752)                                        

 

             RED CELL DISTRIBUTION WIDTH 18.2 %       10.3-14.2    H            



             (BEAKER) (test code = 412)                                        

 

             PLATELET COUNT (BEAKER) (test 313 K/CU MM  150-430                 

  



             code = 756)                                         

 

             MEAN PLATELET VOLUME (BEAKER) 8.6 fL       6.5-10.5                

  



             (test code = 754)                                        

 

             NUCLEATED RED BLOOD CELLS 0 /100 WBC   0-0                       



             (BEAKER) (test code = 413)                                        

 

             NEUTROPHILS RELATIVE PERCENT 70 %                                  

 



             (BEAKER) (test code = 429)                                        

 

             LYMPHOCYTES RELATIVE PERCENT 16 %                                  

 



             (BEAKER) (test code = 430)                                        

 

             MONOCYTES RELATIVE PERCENT 12 %                                   



             (BEAKER) (test code = 431)                                        

 

             EOSINOPHILS RELATIVE PERCENT 1 %                                   

 



             (BEAKER) (test code = 432)                                        

 

             BASOPHILS RELATIVE PERCENT 1 %                                    



             (BEAKER) (test code = 437)                                        

 

             NEUTROPHILS ABSOLUTE COUNT 6.54 K/ L    1.80-8.00                 



             (BEAKER) (test code = 670)                                        

 

             LYMPHOCYTES ABSOLUTE COUNT 1.50 K/ L    1.48-4.50                 



             (BEAKER) (test code = 414)                                        

 

             MONOCYTES ABSOLUTE COUNT (BEAKER) 1.13 K/ L    0.00-1.30           

      



             (test code = 415)                                        

 

             EOSINOPHILS ABSOLUTE COUNT 0.13 K/ L    0.00-0.50                 



             (BEAKER) (test code = 416)                                        

 

             BASOPHILS ABSOLUTE COUNT (BEAKER) 0.06 K/ L    0.00-0.20           

      



             (test code = 417)                                        



0.00URINALYSIS W/ [id number] 20:36:00





             Test Item    Value        Reference Range Interpretation Comments

 

             COLOR (BEAKER) (test code = 470) Yellow                            

     

 

             CLARITY (BEAKER) (test code = 469) Hazy                            

       

 

             SPECIFIC GRAVITY UA (BEAKER) (test 1.012        1.001-1.035        

       



             code = 468)                                         

 

             PH UA (BEAKER) (test code = 467) 5.5          5.0-8.0              

     

 

             PROTEIN UA (BEAKER) (test code = 100 mg/dL    Negative     A       

     



             464)                                                

 

             GLUCOSE UA (BEAKER) (test code = Negative     Negative             

     



             365)                                                

 

             KETONES UA (BEAKER) (test code = Negative     Negative             

     



             371)                                                

 

             BILIRUBIN UA (BEAKER) (test code = Negative     Negative           

       



             462)                                                

 

             BLOOD UA (BEAKER) (test code = 461) Moderate     Negative     A    

        

 

             NITRITE UA (BEAKER) (test code = Negative     Negative             

     



             465)                                                

 

             LEUKOCYTE ESTERASE UA (BEAKER) Large        Negative     A         

   



             (test code = 466)                                        

 

             UROBILINOGEN UA (BEAKER) (test code 3.0 mg/dL    0.2-1.0      H    

        



             = 463)                                              

 

             RBC UA (BEAKER) (test code = 519) 19 /HPF                          

      

 

             WBC UA (BEAKER) (test code = 520) 182 /HPF                         

      

 

             SOURCE(BEAKER) (test code = 2795)                                  

      



BASIC METABOLIC [id number] 17:00:00





             Test Item    Value        Reference Range Interpretation Comments

 

             SODIUM (BEAKER) 140 meq/L    136-145                   



             (test code = 381)                                        

 

             POTASSIUM (BEAKER) 3.7 meq/L    3.5-5.1                   



             (test code = 379)                                        

 

             CHLORIDE (BEAKER) 112 meq/L           H            



             (test code = 382)                                        

 

             CO2 (BEAKER) (test 17 meq/L     22-29        L            



             code = 355)                                         

 

             BLOOD UREA NITROGEN 23 mg/dL     7-21         H            



             (BEAKER) (test code                                        



             = 354)                                              

 

             CREATININE (BEAKER) 1.00 mg/dL   0.57-1.25                 



             (test code = 358)                                        

 

             GLUCOSE RANDOM 102 mg/dL                        



             (BEAKER) (test code                                        



             = 652)                                              

 

             CALCIUM (BEAKER) 8.7 mg/dL    8.4-10.2                  



             (test code = 697)                                        

 

             EGFR (BEAKER) (test 106 mL/min/1.73                           ESTIM

ATED GFR IS



             code = 1092) sq m                                   NOT AS ACCURATE

 AS



                                                                 CREATININE



                                                                 CLEARANCE IN



                                                                 PREDICTING



                                                                 GLOMERULAR



                                                                 FILTRATION RATE

.



                                                                 ESTIMATED GFR I

S



                                                                 NOT APPLICABLE 

FOR



                                                                 DIALYSIS PATIEN

TS.



Specimen slightly ictericCBC W/PLT COUNT &amp; AUTO [id number] 
12:18:00





             Test Item    Value        Reference Range Interpretation Comments

 

             WHITE BLOOD CELL COUNT (BEAKER) 16.4 K/ L    4.0-10.0     H        

    



             (test code = 775)                                        

 

             RED BLOOD CELL COUNT (BEAKER) 5.22 M/ L    4.20-5.80               

  



             (test code = 761)                                        

 

             HEMOGLOBIN (BEAKER) (test code = 14.4 GM/DL   13.0-16.8            

     



             410)                                                

 

             HEMATOCRIT (BEAKER) (test code = 42.9 %       40.0-50.0            

     



             411)                                                

 

             MEAN CORPUSCULAR VOLUME (BEAKER) 82.2 fL      82.0-98.0            

     



             (test code = 753)                                        

 

             MEAN CORPUSCULAR HEMOGLOBIN 27.5 pg      27.0-33.0                 



             (BEAKER) (test code = 751)                                        

 

             MEAN CORPUSCULAR HEMOGLOBIN CONC 33.5 GM/DL   32.0-36.0            

     



             (BEAKER) (test code = 752)                                        

 

             RED CELL DISTRIBUTION WIDTH 16.2 %       10.3-14.2    H            



             (BEAKER) (test code = 412)                                        

 

             PLATELET COUNT (BEAKER) (test 329 K/CU MM  150-430                 

  



             code = 756)                                         

 

             MEAN PLATELET VOLUME (BEAKER) 8.2 fL       6.5-10.5                

  



             (test code = 754)                                        

 

             NUCLEATED RED BLOOD CELLS 0 /100 WBC   0-0                       



             (BEAKER) (test code = 413)                                        

 

             NEUTROPHILS RELATIVE PERCENT 83 %                                  

 



             (BEAKER) (test code = 429)                                        

 

             LYMPHOCYTES RELATIVE PERCENT 7 %                                   

 



             (BEAKER) (test code = 430)                                        

 

             MONOCYTES RELATIVE PERCENT 9 %                                    



             (BEAKER) (test code = 431)                                        

 

             EOSINOPHILS RELATIVE PERCENT 0 %                                   

 



             (BEAKER) (test code = 432)                                        

 

             BASOPHILS RELATIVE PERCENT 0 %                                    



             (BEAKER) (test code = 437)                                        

 

             NEUTROPHILS ABSOLUTE COUNT 1.62 K/ L    1.80-8.00    L            



             (BEAKER) (test code = 670)                                        

 

             LYMPHOCYTES ABSOLUTE COUNT 1.15 K/ L    1.48-4.50    L            



             (BEAKER) (test code = 414)                                        

 

             MONOCYTES ABSOLUTE COUNT (BEAKER) 1.56 K/ L    0.00-1.30    H      

      



             (test code = 415)                                        

 

             EOSINOPHILS ABSOLUTE COUNT 0.01 K/ L    0.00-0.50                 



             (BEAKER) (test code = 416)                                        

 

             BASOPHILS ABSOLUTE COUNT (BEAKER) 0.02 K/ L    0.00-0.20           

      



             (test code = 417)                                        



(MANUAL DIFFERENTIAL)2017 12:18:00





             Test Item    Value        Reference Range Interpretation Comments

 

             TOTAL COUNTED (BEAKER) (test code =                                

        



             1351)                                               

 

             WBC MORPHOLOGY (BEAKER) (test code = Normal                        

         



             487)                                                

 

             PLT MORPHOLOGY (BEAKER) (test code = Normal                        

         



             486)                                                

 

             RBC MORPHOLOGY (BEAKER) (test code = Normal                        

         



             762)

## 2023-05-28 NOTE — EKG
Test Date:    2023-05-25               Test Time:    12:13:06

Technician:   ELVIRA                                    

                                                     

MEASUREMENT RESULTS:                                       

Intervals:                                           

Rate:         121                                    

SD:           134                                    

QRSD:         84                                     

QT:           324                                    

QTc:          460                                    

Axis:                                                

P:            47                                     

SD:           134                                    

QRS:          40                                     

T:            37                                     

                                                     

INTERPRETIVE STATEMENTS:                                       

                                                     

Sinus tachycardia

Otherwise normal ECG

Compared to ECG 05/16/2023 16:07:51

ST (T wave) deviation no longer present



Electronically Signed On 05-28-23 07:26:11 CDT by Cheng Anglin

## 2023-06-12 ENCOUNTER — HOSPITAL ENCOUNTER (EMERGENCY)
Dept: HOSPITAL 97 - ER | Age: 38
Discharge: HOME | End: 2023-06-12
Payer: COMMERCIAL

## 2023-06-12 VITALS — TEMPERATURE: 97.9 F

## 2023-06-12 VITALS — DIASTOLIC BLOOD PRESSURE: 81 MMHG | OXYGEN SATURATION: 98 % | SYSTOLIC BLOOD PRESSURE: 129 MMHG

## 2023-06-12 DIAGNOSIS — Z88.0: ICD-10-CM

## 2023-06-12 DIAGNOSIS — Z88.5: ICD-10-CM

## 2023-06-12 DIAGNOSIS — Z88.3: ICD-10-CM

## 2023-06-12 DIAGNOSIS — M79.605: Primary | ICD-10-CM

## 2023-06-12 DIAGNOSIS — Z88.8: ICD-10-CM

## 2023-06-12 DIAGNOSIS — Z88.1: ICD-10-CM

## 2023-06-12 LAB
ALBUMIN SERPL BCP-MCNC: 2.2 G/DL (ref 3.4–5)
ALP SERPL-CCNC: 693 U/L (ref 45–117)
ALT SERPL W P-5'-P-CCNC: 65 U/L (ref 16–61)
AST SERPL W P-5'-P-CCNC: 44 U/L (ref 15–37)
BUN BLD-MCNC: 10 MG/DL (ref 7–18)
GLUCOSE SERPLBLD-MCNC: 98 MG/DL (ref 74–106)
HCT VFR BLD CALC: 27.8 % (ref 39.6–49)
INR BLD: 1.19
LYMPHOCYTES # SPEC AUTO: 1.7 K/UL (ref 0.7–4.9)
MCV RBC: 82.8 FL (ref 80–100)
PMV BLD: 6.6 FL (ref 7.6–11.3)
POTASSIUM SERPL-SCNC: 4.1 MEQ/L (ref 3.5–5.1)
RBC # BLD: 3.36 M/UL (ref 4.33–5.43)

## 2023-06-12 PROCEDURE — 93005 ELECTROCARDIOGRAM TRACING: CPT

## 2023-06-12 PROCEDURE — 73630 X-RAY EXAM OF FOOT: CPT

## 2023-06-12 PROCEDURE — 85025 COMPLETE CBC W/AUTO DIFF WBC: CPT

## 2023-06-12 PROCEDURE — 36415 COLL VENOUS BLD VENIPUNCTURE: CPT

## 2023-06-12 PROCEDURE — 99284 EMERGENCY DEPT VISIT MOD MDM: CPT

## 2023-06-12 PROCEDURE — 80053 COMPREHEN METABOLIC PANEL: CPT

## 2023-06-12 PROCEDURE — 87040 BLOOD CULTURE FOR BACTERIA: CPT

## 2023-06-12 PROCEDURE — 83605 ASSAY OF LACTIC ACID: CPT

## 2023-06-12 PROCEDURE — 73552 X-RAY EXAM OF FEMUR 2/>: CPT

## 2023-06-12 PROCEDURE — 93971 EXTREMITY STUDY: CPT

## 2023-06-12 PROCEDURE — 73590 X-RAY EXAM OF LOWER LEG: CPT

## 2023-06-12 PROCEDURE — 85730 THROMBOPLASTIN TIME PARTIAL: CPT

## 2023-06-12 PROCEDURE — 85610 PROTHROMBIN TIME: CPT

## 2023-06-12 NOTE — RAD REPORT
EXAM DESCRIPTION:  J Carlos Lenz Left6/12/2023 5:50 pm

 

CLINICAL HISTORY:   Left leg pain

 

FINDINGS:  No fracture is seen. No bony destructive lesion.noted

 

Nonspecific perianal deposition involves the tibia soft tissue swelling.

## 2023-06-12 NOTE — RAD REPORT
EXAM DESCRIPTION:  RAD - Femur Left - 6/12/2023 5:50 pm

 

CLINICAL HISTORY:  Left leg pain

 

FINDINGS:  No fracture is seen. No bony destructive lesion.noted

## 2023-06-12 NOTE — ER
Nurse's Notes                                                                                     

 [facility]                                                                 

Name: Redd Dale Jr                                                                            

Age: 37 yrs                                                                                       

Sex: Male                                                                                         

: 1985                                                                                   

MRN: [id number]                                                                                   

Arrival Date: 2023                                                                          

Time: 14:33                                                                                       

Account#: [id number]                                                                            

Bed 16                                                                                            

Private MD:                                                                                       

Diagnosis: Pain in left leg                                                                       

                                                                                                  

Presentation:                                                                                     

                                                                                             

14:56 Chief complaint: Patient states: Sent by Dr. Mc for left leg wound. Coronavirus      jl7 

      screen: At this time, the client does not indicate any symptoms associated with             

      coronavirus-19. Ebola Screen: No symptoms or risks identified at this time. Initial         

      Sepsis Screen: Does the patient meet any 2 criteria? No. Patient's initial sepsis           

      screen is negative. Does the patient have a suspected source of infection? No.              

      Patient's initial sepsis screen is negative. Risk Assessment: Do you want to hurt           

      yourself or someone else? Patient reports no desire to harm self or others. Onset of        

      symptoms is unknown.                                                                        

14:56 Method Of Arrival: Wheelchair                                                           [facility] 

14:56 Acuity: AYESHA 3                                                                           jl7 

                                                                                                  

Triage Assessment:                                                                                

14:57 General: Appears in no apparent distress. uncomfortable, Behavior is calm, cooperative. jl7 

      Pain: Complains of pain in left leg Pain currently is 10 out of 10 on a pain scale.         

                                                                                                  

Historical:                                                                                       

- Allergies:                                                                                      

14:57 Amoxicillin;                                                                            jl7 

14:57 Bactrim;                                                                                jl7 

14:57 Ciprofloxacin;                                                                          jl7 

14:57 CLAVULANIC ACID;                                                                        jl7 

14:57 Demerol;                                                                                jl7 

14:57 Doxycycline;                                                                            jl7 

14:57 Levofloxacin;                                                                           jl7 

14:57 Morphine;                                                                               jl7 

14:57 PENICILLINS;                                                                            jl7 

14:57 Toradol;                                                                                jl7 

14:57 TRIMETHOPRIM;                                                                           jl7 

14:57 Vancomycin;                                                                             jl7 

14:57 Zofran;                                                                                 jl7 

- PMHx:                                                                                           

14:57 Asthma; Cerebral Palsy; cluster headaches; decubitus ulcers on feet; diabetes mellitus; jl7 

      GERD; Hydrocephalus; Hypertension; spina bifida;                                            

- PSHx:                                                                                           

14:57 Cholecystectomy; Shunt Revision;                                                        jl7 

                                                                                                  

- Immunization history:: Adult Immunizations up to date.                                          

- Social history:: Smoking status: unknown.                                                       

                                                                                                  

                                                                                                  

Screenin:25 [facility] ED Fall Risk Assessment (Adult) History of falling in the last 3 months,       db  

      including since admission No falls in past 3 months (0 pts) Confusion or Disorientation     

      No (0 pts) Intoxicated or Sedated No (0 pts) Impaired Gait No (0 pts) Mobility Assist       

      Device Used No (0 pt) Altered Elimination Yes (1 pt) Score/Fall Risk Level 0 - 2 = Low      

      Risk Oriented to surroundings, Maintained a safe environment. Abuse screen: Denies          

      threats or abuse. Denies injuries from another. Nutritional screening: No deficits          

      noted. Tuberculosis screening: No symptoms or risk factors identified.                      

                                                                                                  

Assessment:                                                                                       

15:10 Reassessment: Patient appears in no apparent distress at this time. Patient and/or      db  

      family updated on plan of care and expected duration. Pain level reassessed. Patient is     

      alert, oriented x 3, equal unlabored respirations, skin warm/dry/pink. left leg and         

      foot wound with infection. General: Appears in no apparent distress. uncomfortable,         

      Behavior is calm, cooperative. Pain: Complains of pain in left foot. Neuro: Level of        

      Consciousness is awake, alert, obeys commands, Oriented to person, place, time,             

      situation.                                                                                  

15:16 Reassessment: patient to US.                                                            db  

16:00 Reassessment: Patient appears in no apparent distress at this time. Patient and/or      db  

      family updated on plan of care and expected duration. Pain level reassessed. Patient is     

      alert, oriented x 3, equal unlabored respirations, skin warm/dry/pink.                      

16:15 Reassessment: Difficulty obtaining IV access. Notified charge for ultrasound IV access. db  

17:24 Reassessment: Patient appears in no apparent distress at this time. Patient and/or      db  

      family updated on plan of care and expected duration. Pain level reassessed. Patient is     

      alert, oriented x 3, equal unlabored respirations, skin warm/dry/pink. patient assisted     

      to bed from wheelchair.                                                                     

                                                                                                  

Vital Signs:                                                                                      

14:56  / 89; Pulse 82; Resp 17; Temp 97.9; Pulse Ox 95% ; Pain 10/10;                   jl7 

15:45  / 83; Pulse 108; Resp 16; Pulse Ox 94% on R/A;                                   db  

16:30  / 94; Pulse 114; Resp 18; Pulse Ox 96% ;                                         db  

17:15  / 117; Pulse 106; Resp 18; Pulse Ox 96% on R/A;                                  db  

20:22  / 81; Pulse 107; Resp 18; Pulse Ox 98% on R/A;                                   ll3 

14:56 Pain Scale: Adult                                                                       jl7 

                                                                                                  

ED Course:                                                                                        

14:42 Patient arrived in ED.                                                                  rg4 

14:57 Triage completed.                                                                       jl7 

14:57 Arm band placed on right wrist.                                                         jl7 

15:00 Lenka Amaro [facility]-C [facility]                                                        kb  

15:00 Siddharth Gonsalez DO is Attending Physician.                                                kb  

15:16 Muna Bhat, RN is Primary Nurse.                                                  db  

15:41 US Extremity Venous Unilateral Ltd In Process Unspecified.                              EDMS

16:05 Missed attempt(s): 20 gauge in left antecubital area. Bleeding controlled, band aid     db  

      applied, catheter tip intact.                                                               

16:40 EKG done, by ED staff, reviewed by Lenka GUZMÁN.                               ss  

16:57 Patient has correct armband on for positive identification. Bed in low position. Call   db  

      light in reach. Side rails up X 1. Client placed on continuous cardiac and pulse            

      oximetry monitoring. NIBP monitoring applied.                                               

17:15 Inserted saline lock: 22 gauge in left forearm, using aseptic technique. ,using aseptic db  

      technique. collected by YADIEL Rojas with sono site line Blood collected.                     

17:51 Tib Fib Left XRAY In Process Unspecified.                                               EDMS

17:51 Femur Left XRAY In Process Unspecified.                                                 EDMS

17:51 Foot Left 3 View XRAY In Process Unspecified.                                           EDMS

20:21 No provider procedures requiring assistance completed. IV discontinued, intact,         ll3 

      bleeding controlled, No redness/swelling at site. Pressure dressing applied.                

                                                                                                  

Administered Medications:                                                                         

17:05 Drug: HYDROmorphone IVP 0.5 mg Route: IVP; Site: left antecubital;                      db  

18:23 Follow up: Response: No adverse reaction                                                db  

18:27 Drug: NS 0.9% IV 1000 ml Route: IV; Rate: 1000 ml; Site: left antecubital;              db  

20:21 Follow up: Response: No adverse reaction; IV Status: Completed infusion; IV Intake:     ll3 

      1000ml                                                                                      

18:49 Drug: HYDROmorphone IVP 0.5 mg Route: IVP; Site: left forearm;                          db  

20:22 Follow up: Response: No adverse reaction                                                ll3 

                                                                                                  

                                                                                                  

Medication:                                                                                       

20:21 VIS not applicable for this client.                                                     ll3 

                                                                                                  

Intake:                                                                                           

20:21 IV: 1000ml; Total: 1000ml.                                                              ll3 

                                                                                                  

Output:                                                                                           

18:46 Urine: 325ml (Ortega); Total: 325ml.                                                     db  

                                                                                                  

Outcome:                                                                                          

18:31 Discharge ordered by MD. felipe  

20:21 Discharged to home via wheelchair.                                                      ll3 

20:21 Condition: stable                                                                           

20:21 Discharge instructions given to patient, Instructed on discharge instructions, follow       

      up and referral plans. Demonstrated understanding of instructions, follow-up care.          

20:23 Patient left the ED.                                                                    ll3 

                                                                                                  

Signatures:                                                                                       

Dispatcher [employer]                           EDMS                                                 

Lenka Amaro, FNP-C                 FNP-CkCrystal Daly, RN                   RN   ss                                                   

Lauryn Long                                 rg4                                                  

Jama Bonilla RN                        RN   jl7                                                  

Preet Hoffman RN                      RN   ll3                                                  

Muna Bhat RN                    RN   db                                                   

                                                                                                  

Corrections: (The following items were deleted from the chart)                                    

18:06 15:16 Reassessment: patient to CT db                                                    db  

                                                                                                  

**************************************************************************************************

## 2023-06-12 NOTE — RAD REPORT
EXAM DESCRIPTION:  RAD - Foot Left 3 View - 6/12/2023 5:50 pm

 

CLINICAL HISTORY:  Left Foot pain

 

FINDINGS:  No fracture is seen. No bony destructive lesion.noted

 

Osteoporosis

## 2023-06-12 NOTE — XMS REPORT
Continuity of Care Document

                            Created on:2023



Patient:JORDAN VERDIN JR.AJITH

Sex:Male

:1985

External Reference #:[id number]





Demographics







                          Address                   [address]



                                                    [place] [address]

 

                          Home Phone                [phone]

 

                          Work Phone                [phone]

 

                          Mobile Phone              [phone]

 

                          Email Address             [email or web address]

 

                          Preferred Language        English

 

                          Marital Status            Unknown

 

                          Faith Affiliation     Unknown

 

                          Race                      Unknown

 

                          Additional Race(s)        Black or 



                                                    Unavailable

 

                          Ethnic Group              Unknown









Author







                          Organization              [facility]

t

 

                          Address                   [address]



                                                    [place] [address]

 

                          Phone                     [phone]









Support







                Name            Relationship    Address         Phone

 

                SABRINA VERDIN  Mother          [address] [phone]



                                                [place] [id number] 

 

                one else per patient, No Unavailable     Unavailable     Unavail

able

 

                CHITOJESSICASABRINA  MO              [address]           [phone]



                                                [address] [place] [id number] 

 

                Jordan Verdin  Unavailable     [address] [phone]



                                                [place] [address] 

 

                Cecelia Verdin Unavailable     Unavailable     [phone]

 

                Chito PressleyJordan Unavailable     [address]

 [phone]



                                                [place] [address] 

 

                Sabrina Verdin  Mother          [address] [phone]



                                                [place] [id number] 

 

                PATIENT, NO ONE ELSE PER Unavailable     Unavailable     Unavail

able

 

                PHYILLIS        Grandparent     Unavailable     Unavailable

 

                SABRINA VERDIN M               [address]    Unavailabl

e



                                                [place] [address] 

 

                Sabrina Bustillo Mother          [address]    [phone]



                                                [place] [id number] 

 

                YAZ, SMITA  Grandparent     Unavailable     [phone]

 

                JORDAN VERDIN  F               [address]    Unavailable



                                                [place] [address] 

 

                O'GREG, SMITA LARUA Grandparent     PO       Unavailable



                                                [place] [address] 

 

                Laura O'Greg, Smita Grandparent     PO       [phone]



                                                [place] [address] 

 

                Chito Sr., Jordan Father          [address]    [phone]

[phone]



                                                [place] [address] 









Care Team Providers







                    Name                Role                Phone

 

                    Sharpless           Primary Care Physician [phone]

 

                    Domingo Mc       Attending Clinician Unavailable

 

                    Marcela Allan     Attending Clinician [phone]

 

                    SUREKHA HUYNH      Attending Clinician Unavailable

 

                    Alan [facility], Anette MCMAHON Attending Clinician Unavailable

 

                    SCHOENSTEIN, LYNDA  Attending Clinician Unavailable

 

                    Schoenstein MD, Lynda Attending Clinician [phone]

 

                    Mary Anne Ramirez MD          Attending Clinician [phone]

 

                    Doctor Unassigned, No Name Attending Clinician Unavailable

 

                    MARY ANNE RAMIREZ             Attending Clinician Unavailable

 

                    RADHA FOFANA     Attending Clinician Unavailable

 

                    Radha Fofana MD  Attending Clinician [phone]

 

                    Surekha Huynh MD   Attending Clinician [phone]

 

                    NEETA MARIA    Attending Clinician Unavailable

 

                    Neeta Maria NP Attending Clinician [phone]

 

                    Nurse, Adc Surgery Gu Attending Clinician Unavailable

 

                    BOSSMAN VILLA Attending Clinician Unavailable

 

                    Bossman Contreras Attending Clinician [phone]

 

                    Dulce Guzman LVN Attending Clinician [phone]

 

                    DONALD FOX  Attending Clinician Unavailable

 

                    DONALD FOX  Attending Clinician Unavailable

 

                    Yoni Bangura Attending Clinician [phone]

 

                    Noe Phillips MD     Attending Clinician [phone]

 

                    Vikash Anglin MD  Attending Clinician [phone]

 

                    Alondra Dumont Attending Clinician Unavailable

 

                    Shanique MEADOWS, Adria Attending Clinician [phone]

[phone]

 

                    VARINDER SOL     Attending Clinician Unavailable

 

                    Varinder Sol DO  Attending Clinician [phone]

 

                    Deepak COTTO, Roya ERAZO    Attending Clinician [phone]

 

                    INOCENICA GOLDEN  Attending Clinician Unavailable

 

                    Inocencia Golden DO Attending Clinician [phone]

 

                    RAND DONG      Attending Clinician Unavailable

 

                    Rand Dong MD   Attending Clinician [phone]

 

                    Lab, Ang - Db       Attending Clinician Unavailable

 

                    EDWARDO LOPEZ       Attending Clinician Unavailable

 

                    Edwardo Lopez DO    Attending Clinician [phone]

 

                    AMLA VILLASEÑOR    Attending Clinician Unavailable

 

                    Erum FNP, Alma PAN Attending Clinician [phone]

 

                    SILVINO GARAY    Attending Clinician Unavailable

 

                    Silvino Garay MD Attending Clinician [phone]

 

                    RACHEL BAILEY      Attending Clinician Unavailable

 

                    Rachel Bailey MD   Attending Clinician [phone]

 

                    OGUNLANA, SAPPHIRE A Attending Clinician Unavailable

 

                    Scooter COTTO, Yessica NELSON Attending Clinician Unavailable

 

                    Yoselyn MEADOWS, Jessi  Attending Clinician [phone]

 

                    Ronel COTTO, Stephany ARAUJO Attending Clinician [phone]

 

                    PEDRO SHARPE   Attending Clinician Unavailable

 

                    Thanh JACOBSON, Yo MANRIQUEZ Attending Clinician [phone]

 

                    Jojo MEADOWS, Keenan Olmedo Attending Clinician [phone]

[phone]

 

                    Trish MEADOWS, León [place]   Attending Clinician [phone]

 

                    Alan MEADOWS, Liz G  Attending Clinician [phone]

 

                    Pedro Sharpe MD Attending Clinician [phone]

 

                    Sheridan Delarosa MD Attending Clinician [phone]

 

                    Shahid MEADOWS, Mac ARAUJO   Attending Clinician [phone]

 

                    Vamshi ALEGRE, Sapphire A Attending Clinician [phone]

 

                    SUNIL LU      Attending Clinician Unavailable

 

                    Ashly Greene S  Attending Clinician [phone]

 

                    Sunil Lu MD   Attending Clinician [phone]

 

                    RADHA MEADOWS Attending Clinician Unavailable

 

                    Alondra Santos MD Attending Clinician [phone]

 

                    KEM MEADOWS    Attending Clinician Unavailable

 

                    Abdiel MEADOWS, eKm Attending Clinician [phone]

 

                    Josse Alonso Attending Clinician [phone]

 

                    Octavio JACOBSON, Shelton Attending Clinician [phone]

 

                    DEAN SEGOVIA    Attending Clinician Unavailable

 

                    Tobias Hernandez MD  Attending Clinician [phone]

 

                    Efrain Myles MD Attending Clinician [phone]

 

                    Martha Powell MD  Attending Clinician [phone]

 

                    Dean Segovia MD Attending Clinician Unavailable

 

                    ROMERO FAIRCHILD Attending Clinician Unavailable

 

                    Romero Fairchild Attending Clinician [phone]

 

                    Ap Bernardo Casandra Attending Clinician [phone]

 

                    ALLISON ROMEO     Attending Clinician Unavailable

 

                    ALLISON Carver Attending Clinician [phone]

 

                    Only, Ang Db Test   Attending Clinician Unavailable

 

                    Stefano Llanes MD     Attending Clinician [phone]

 

                    STEFANO LLANES        Attending Clinician Unavailable

 

                    MARTY RIVERS    Attending Clinician Unavailable

 

                    Deisy Linares   Attending Clinician [phone]

 

                    Deedee Reinoso   Attending Clinician [phone]

 

                    RAMU TORRES Attending Clinician Unavailable

 

                    BOSSAMN VILLA Admitting Clinician Unavailable

 

                    DONALD FOX  Admitting Clinician Unavailable

 

                    RAND DONG      Admitting Clinician Unavailable

 

                    Rand Dong MD   Admitting Clinician [phone]

 

                    EDWARDO LOPEZ       Admitting Clinician Unavailable

 

                    Edwardo Lopez DO    Admitting Clinician [phone]

 

                    SILVINO GARAY    Admitting Clinician Unavailable

 

                    RACHEL BAILEY      Admitting Clinician Unavailable

 

                    Rachel Bailey MD   Admitting Clinician [phone]

 

                    LEÓN CHAMBERS      Admitting Clinician Unavailable

 

                    León Chambers MD   Admitting Clinician [phone]

 

                    SUNIL LU      Admitting Clinician Unavailable

 

                    Sunil Lu MD   Admitting Clinician [phone]

 

                    VARINDER SOL     Admitting Clinician Unavailable

 

                    KEM MEADOWS    Admitting Clinician Unavailable

 

                    Kem Meadows MD Admitting Clinician [phone]

 

                    TOBIAS HERNANDEZ     Admitting Clinician Unavailable

 

                    Tobias Hernandez MD  Admitting Clinician [phone]

 

                    DEISY SUTTON Admitting Clinician Unavailable

 

                    Deisy Sutton Admitting Clinician [phone]

 

                    Bernardo De SouzaDonna Admitting Clinician [phone]

 

                    ALLISON ROMEO     Admitting Clinician Unavailable

 

                    ALMA VILLASEÑOR    Admitting Clinician Unavailable

 

                    NEETA MARIA    Admitting Clinician Unavailable

 

                    NURIA PEREIRA        Admitting Clinician Unavailable

 

                    Peter De Leon Admitting Clinician [phone]

 

                    Deedee Reinoso   Admitting Clinician [phone]

 

                    RAMU TORRES Admitting Clinician Unavailable









Payers







           Payer Name Policy Type Policy Number Effective Date Expiration Date S

cameron

 

           [employer]            [id number]  2021            



           PLUS                             00:00:00              

 

           [facility]         [id number]                        [facility]



           [facility]         [id number]                        Common



           (Medicaid)                                             [facility]         [id number]                        Common



           (Medicaid)                                             [facility]         [id number]                        Common



           (Medicaid)                                             [facility]         [id number]                        Common



           (Medicaid)                                             [facility]







Problems







       Condition Condition Condition Status Onset  Resolution Last   Treating Co

mments 

Source



       Name   Details Category        Date   Date   Treatment Clinician        



                                                 Date                 

 

       Hyperglyce Hyperglyce Disease Active                              U

nivers



       jarvis due to jarvis due to               2-21                               it

y of



       diabetes diabetes               00:00:                             [place]



       mellitus mellitus               00                                 Medica

l



                                                                      Branch

 

       Weakness Weakness Disease Active                              Unive

rs



                                   2-01                               ity of



                                   00:00:                             [place]



                                   00                                 Medical



                                                                      Branch

 

       Lactic Lactic Disease Active 2022                             [facility]



       acidosis acidosis               1-06                               ity of



                                   00:00:                             [place]



                                   00                                 Medical



                                                                      Branch

 

       Nausea and Nausea and Disease Active 2022                             U

nivers



       vomiting, vomiting,               0-21                               ity 

of



       unspecifie unspecifie               00:00:                             [place]

[place]



       d vomiting d vomiting               00                                 Me

dical



       type   type                                                    Branch

 

       Chest pain Chest pain Disease Active                              U

nivers



                                   8-04                               ity of



                                   00:00:                             [place]



                                   00                                 Medical



                                                                      Branch

 

       Foot ulcer Foot ulcer Disease Active                       Overview

: Univers



       with fat with fat               803                        Formattin ity

 of



       layer  layer                00:00:                      g of this [place]



       exposed, exposed,               00                          note   Medica

l



       left   left                                             might be Branch



                                                               different 



                                                               from the 



                                                               original. 



                                                               Added  



                                                               automatic 



                                                               ally from 



                                                               request 



                                                               for    



                                                               surgery 



                                                               [id number] 

 

       Right foot Right foot Disease Active                       Overview

: Univers



       ulcer, ulcer,                                       Formattin ity of



       with fat with fat               00:00:                      g of this Eric

as



       layer  layer                00                          note   Medical



       exposed exposed                                           might be Branch



                                                               different 



                                                               from the 



                                                               original. 



                                                               Added  



                                                               automatic 



                                                               ally from 



                                                               request 



                                                               for    



                                                               surgery 



                                                               [id number] 

 

       Diabetic Diabetic Disease Active                              Unive

rs



       ketoacidos ketoacidos               -                               it

y of



       is without is without               00:00:                             Te

[place]



       coma   coma                 00                                 Medical



       associated associated                                                  Br

anch



       with type with type                                                  



       1 diabetes 1 diabetes                                                  



       mellitus mellitus                                                  

 

       Abdominal Abdominal Disease Active                              Uni

vers



       pain,  pain,                6-24                               ity of



       unspecifie unspecifie               00:00:                             Te

[place]



       d      d                    00                                 Medical



       abdominal abdominal                                                  Bran

ch



       location location                                                  

 

       Hyperglyce Hyperglyce Disease Active                              U

jeferson



       jarvis    jarvis                  6                               ity of



                                   00:00:                             [place]



                                   00                                 Medical



                                                                      Branch

 

       Decubitus Decubitus Disease Active                              Uni

vers



       ulcer of ulcer of               6-05                               ity of



       heel,  heel,                00:00:                             [place]



       left,  left,                00                                 Medical



       unstageabl unstageabl                                                  Br

anch



       e      e                                                       

 

       Decubitus Decubitus Disease Active                              Uni

vers



       ulcer of ulcer of               6-05                               ity of



       heel,  heel,                00:00:                             [place]



       left,  left,                00                                 Medical



       unstageabl unstageabl                                                  Br

anch



       e      e                                                       

 

       Diabetic Diabetic Disease Active                              Unive

rs



       ketoacidos ketoacidos               6-05                               it

y of



       is without is without               00:00:                             Te

[place]



       coma   coma                 00                                 Medical



       associated associated                                                  Br

anch



       with type with type                                                  



       2 diabetes 2 diabetes                                                  



       mellitus mellitus                                                  

 

       Decubitus Decubitus Disease Active                              Uni

vers



       ulcer of ulcer of               6-05                               ity of



       heel,  heel,                00:00:                             [place]



       left,  left,                00                                 Medical



       unstageabl unstageabl                                                  Br

anch



       e      e                                                       

 

       Pyuria Pyuria Disease Active                              Univers



                                   6-05                               ity of



                                   00:00:                             [place]



                                   00                                 Medical



                                                                      Branch

 

       Chronic Chronic Disease Active                              Univers



       suprapubic suprapubic               6                               it

y of



       catheter catheter               00:00:                             [place]



                                                                    Medical



                                                                      Branch

 

       Uncontroll Uncontroll Disease Active                              U

jeferson



       ed     ed                   6-03                               ity of



       diabetes diabetes               00:00:                             [place]



       mellitus mellitus               00                                 Medica

l



       with   with                                                    Branch



       complicati complicati                                                  



       ons    ons                                                     

 

       Spina  Spina  Disease Recurre                              Univers



       bifida bifida        nce    6-03                               ity of



                                   00:00:                             [address]

 

       Wound  Wound  Disease Active                              Univers



       infection infection               5-11                               ity 

of



                                   00:00:                             [address]



                                                                      Branch

 

       Chills Chills Disease Active                              Univers



                                   5-11                               ity of



                                   00:00:                             [address]

 

       POSSIBLE  POSSIBLE Diagnosis Active 2022             

  [facility]



        SHUNT  SHUNT                         21:48:00               l



       MALFUNCTIO MALFUNCTIO               00:00:                             

goyoann



       N      N Active               00                                 



              2022                                                  



               [facility]                                                  

 

        SHUNT    SHUNT Diagnosis Active 2022            

   [facility]



       MALFUNCTIO MALFUNCTIO               3-25          14:12:00               

l



       N      N Active               00:00:                             Jose



              2022               00                                 



               [facility]                                                  

 

        SHUNT   SHUNT Diagnosis Active 2022             

  [facility]



       MALFUCTION MALFUCTION               3-24          23:59:00               

l



               Active               00:00:                             Jose



              2022               00                                 



              [facility]                                                  

 

       Complicate Complicate Disease Active                              U

nivers



       d urinary d urinary               1-20                               ity 

of



       tract  tract                00:00:                             [place]



       infection infection               00                                 [facility]

 

       Hyperglyce Hyperglyce Disease Active                              C

[facility]



       jarvis    jarvis                  [address]                               [facility]



       without without               00:00:                             Medical



       ketosis ketosis               00                                 [place]

 

       HEADACHE  HEADACHE Diagnosis Active 2018             

  [facility]



              Active                         22:05:00               l



              2018               00:00:                             Miguel malloy



              [address]                                                  

 

       SHUNT   SHUNT Diagnosis Active 2018               Mem

oria



       MALFUNCTIO MALFUNCTIO               -          20:00:00               

l



       N      N Active               00:00:                             Jose



              2018               00                                 



              [facility]                                                  

 

       ACUTE   ACUTE Diagnosis Active 2018               Mem

oria



       HEADACHE HEADACHE               -          09:20:00               l



              Active               00:00:                             Jose



              2018               00                                 



               [facility]                                                  

 

       Spina  Spina  Disease Recurre                              [facility]



       bifida bifida        nce    6-12                               [facility]



                                   00:00:                             Medical



                                   [address] [place] [address]

 

       Pyelonephr Pyelonephr Disease Active                              C

[facility]



       itis   itis                 6                               [facility]



                                   00:00:                             Medical



                                   00                                 Center

 

       Morbid Morbid Disease Active                              [facility]



       obesity obesity               1-04                               ity of



       with body with body               00:00:                             [place]

[place]



       mass index mass index               00                                 Me

dical



       of     of                                                      Branch



       40.0-49.9 40.0-49.9                                                  

 

       Lumbar        Problem Active                                    Common



       spina                                                          [facility]



       bifida                                                         - [facility]



       with                                                           [facility]

 

       Dependence        Problem Active                                    Commo

n



       on                                                             [facility]



       wheelchair                                                         [facility]

 

       Paraplegia        Problem Active                                    Commo

n



                                                                      [facility]

 

       Constipati        Problem Active                                    Commo

n



       on                                                             [facility]

 

       Incontinen        Problem Active                                    Commo

n



       ce of                                                          [facility]



       urine                                                          [facility]

 

       Nausea        Problem Active                                    [facility]

 

       Mixed         Problem Active                                    Common



       anxiety                                                         [facility]



       and                                                            - [facility]



       depressive                                                         [facility]

 

       [id number]        Problem Active                                    Common



                                                                      [facility]

 

       Bowel         Problem Active                                    Common



       incontinen                                                         [facility]

 

       [id number]        Problem Active                                    Common



                                                                      [facility]

 

       [id number]        Problem Active                                    [facility]

 

       [id number]        Problem Active                                    Common



                                                                      [facility]

 

       [id number]        Problem Active                                    Commo

n



       [id number]                                                         [facility]

 

       Foot ulcer        Problem Active                                    Commo

n



                                                                      [facility]

 

       Myelocele        Problem Active                                    Common



       with                                                           [facility]

 

       [id number]        Problem Active                                    [facility]

 

       [id number]        Problem Active                                    Common



                                                                      [facility]

 

       Nicotine        Problem Active                                    Common



       dependence                                                         [facility]

 

       [id number]        Problem Active                                    Common



                                                                      [facility]

 

       [id number]        Problem Active                                    Common



                                                                      [facility]

 

       [id number]        Problem Active                                    Common



                                                                      [facility]

 

       [id number]        Problem Active                                    [facility]

 

       Spina   Spina Problem                      2019               Memor

ia



       bifida, bifida,                             14:14:02               l



       unspecifie unspecifie                                                  He

rmann



       d      d                                                       



              2019                                                  



              [facility]                                                  

 

       Nausea  Nausea Problem                      2019               [facility]

rajeev



       with   with                               14:14:02               l



       vomiting, vomiting,                                                  [facility]

nguyen



       unspecifie unspecifie                                                  



       d      d                                                       



              2019                                                  



              [facility]                                                  

 

       Diplopia  Diplopia Problem                      2019               

[facility]



              2019                             14:14:02               l



              [facility]                                                  

 

       Cerebral  Cerebral Problem                      2019               

[facility]



       palsy, palsy,                             14:14:02               l



       unspecifie unspecifie                                                  He

rmann



       d      d                                                       



              2019                                                  



              [facility]                                                  

 

       Acquired  Acquired Problem                      2019               

[facility]



       absence of absence of                             14:14:02               

l



       other  other                                                   [place]



       specified specified                                                  



       parts of parts of                                                  



       digestive digestive                                                  



       tract  tract                                                   



              2019                                                  



              [facility]                                                  

 

       Nicotine  Nicotine Problem                      2019               

[facility]



       dependence dependence                             14:14:02               

l



       ,      ,                                                       [place]



       cigarettes cigarettes                                                  



       ,      ,                                                       



       uncomplica uncomplica                                                  



       huma    huma                                                     



              2019                                                  



              [facility]                                                  

 

       Presence   Presence Problem                      2019              

 [facility]



       of     of                                 14:14:02               l



       cerebrospi cerebrospi                                                  He

rmann



       nal fluid nal fluid                                                  



       drainage drainage                                                  



       device device                                                  



              2019                                                  



               [facility]                                                  

 

       Allergy  Allergy Problem                      2019               Me

moria



       status to status to                             14:14:02               l



       other  other                                                   Jose



       antibiotic antibiotic                                                  



       agents agents                                                  



       status status                                                  



              2019                                                  



              [facility]                                                  

 

       Allergy  Allergy Problem                      2019               Me

moria



       status to status to                             14:14:02               l



       other  other                                                   Jose



       drugs, drugs,                                                  



       medicament medicament                                                  



       s and  s and                                                   



       biological biological                                                  



       substances substances                                                  



       status status                                                  



              2019                                                  



               [facility]                                                  

 

       Allergy  Allergy Problem                      2019               Me

moria



       status to status to                             14:14:02               l



       narcotic narcotic                                                  Miguel

n



       agent  agent                                                   



       status status                                                  



              2019                                                  



              [facility]                                                  

 

       Asthma  Asthma Problem Resolve               2022-04-15               Mem

oria



       (disorder) (disorder)        d                    23:48:47               

l



              Resolved                                                  Jose



              Problem                                                  



              04/15/2022                                                  



              [facility]                                                  

 

       Bronchitis  Bronchiti Problem Resolve               2022-04-15           

    [facility]



       (disorder) s             d                    23:48:47               l



              (disorder)                                                  Miguel

n



              Resolved                                                  



              Problem                                                  



              04/15/2022                                                  



              [facility]                                                  

 

       Cerebral  Cerebral Problem Resolve               2022-04-15              

 [facility]



       palsy  palsy         d                    23:48:47               l



       (disorder) (disorder)                                                  He

rmann



              Resolved                                                  



              Problem                                                  



              04/15/2022                                                  



               [facility]                                                  

 

       Hydrocepha  Hydroceph Problem Resolve               2022-04-15           

    [facility]



       ozzy    alus          d                    23:48:47               l



       (disorder) (disorder)                                                  He

rmann



              Resolved                                                  



              Problem                                                  



              04/15/2022                                                  



              [facility]                                                  

 

       Osteomyeli  Osteomyel Problem Resolve               2022-04-15           

    [facility]



       tis    itis          d                    23:48:47               l



       (disorder) (disorder)                                                  He

rmann



               Resolved                                                  



              Problem                                                  



              04/15/2022                                                  



              [facility]                                                  

 

       Acute pain  Acute Problem Active               2022-04-15               M

emoria



       (finding) pain                               23:48:47               l



              (finding)                                                  [place]



              Active                                                  



              Problem                                                  



              04/15/2022                                                  



              [facility]                                                  

 

       Morbid  Morbid Problem Active               2022-04-15               [facility]

rajeev



       obesity obesity                             23:48:47               l



       (disorder) (disorder)                                                  He

rmann



              Active                                                  



              Problem                                                  



              04/15/2022                                                  



              [facility]                                                  

 

       Providenci        Problem Active               2022-04-15               M

emoria



       a      Providenci                             23:48:47               l



       (organism) aminata malloy



              (organism)                                                  



              Active                                                  



              Problem                                                  



              04/15/2022                                                  



              Problem                                                  



              added by                                                  



              Discern                                                  



              Expert. [facility]                                                  







History of Past Illness







       Condition Condition Condition Status Onset  Resolution Last   Treating Co

mments 

Source



       Name   Details Category        Date   Date   Treatment Clinician        



                                                 Date                 

 

       Headache   Headache Problem        2019          

     [facility]



              2018-   14:14:02 14:14:02               l



              2019               06:00:                             Miguel malloy



              [address]                                                  







Allergies, Adverse Reactions, Alerts







       Allergy Allergy Status Severity Reaction(s) Onset  Inactive Treating Comm

ents 

Source



       Name   Type                        Date   Date   Clinician        

 

       Amoxicil Propensi Active        Hives                        Univer

s



       carine    ty to                       7-27                        ity of



              adverse                      00:00:                      [place]



              reaction                      00                          Medical



              [facility]

 

       AMOXICIL DRUG   Active        Hives                        [facility]



       CARINE    INGREDI                      7-27                        ity of



                                          00:00:                      [place]



                                          00                          Medical



                                                                      [facility]

 

       Metoclop Propensi Active        Nausea 2017                      Univer

s



       ramide ty to                and/or 2-20                        ity of



       Hcl    adverse               Vomiting 00:00:                      [place]



              reaction                      00                          Medical



              s                                                       Branch

 

       METOCLOP DRUG   Active        N/V    2017                      [facility]



       RAMIDE INGREDI                      2-20                        ity of



       HCL                                00:00:                      [place]



                                          00                          Medical



                                                                      [facility]

 

       SULFAMET Allergy Active High   Hives                        [facility]



       HOXAZOLE                             6-11                        [facility]



       -TRIMETH                             00:00:                      Medical



       OPRIM                              [address] [place] [address]

 

       LEVOFLOX Allergy Active High   Hives                        [facility]



       ACIN                               6-11                        [facility]



                                          00:00:                      [facility]



                                          00                          Center

 

       MORPHINE Allergy Active High   Hives  2017-0                      [facility]



                                          6-11                        [facility]



                                          00:[facility]                      [facility]



                                          00                          Center

 

       KETOROLA Allergy Active High   Rash   2017-0                      [facility]



       C                                  6-11                        [facility]



                                          00:00:                      Medical



                                          00                          Center

 

       VANCOMYC Allergy Active High   Rash   2017-0                      [facility]



       IN                                 6-11                        [facility]



       ANALOGUE                             00:00:                      Medical



       S                                  00                          Center

 

       SESAME Allergy Active        Hives  2017-                      [facility]



       SEED                               6-11                        [facility]



                                          00:00:                      Medical



                                          00                          Center

 

       ONDANSET Allergy Active        N\T\V  2017-                      [facility]



       EVIN HCL                             [address]                        [facility]



       (PF)                               00:00:                      Medical



                                          00                          Center

 

       SESAME DRUG   Active        Hives  2017-                      [facility]



       SEED   INGREDI                      6-                        ity of



                                          00:00:                      [place]



                                          00                          Medical



                                                                      Branch

 

       Sulfamet Propensi Active        Hives  2017-0                      [facility]



       hoxazole ty to                                               [facility]



       -[facility] adverse                      00:00:                      Medical



       oprim  reaction                      00                          Center



              s                                                       

 

       Levoflox Propensi Active        Hives  2017-0                      [facility]



       acin   ty to                                               [facility]



              adverse                      00:00:                      Medical



              reaction                      00                          Center



              s                                                       

 

       Morphine Propensi Active        Hives  2017-0                      [facility]



              ty to                                               [facility]



              adverse                      00:00:                      Medical



              reaction                      00                          Center



              s                                                       

 

       Sesame Propensi Active        Hives  2017-0                      [facility]



       Seed   ty to                                               [facility]



              adverse                      00:00:                      Medical



              reaction                      00                          Center



              s                                                       

 

       Ketorola Propensi Active        Rash   2017-0                      [facility]



       c      ty to                       6                        [facility]



              adverse                      00[facility]00:                      Medical



              reaction                      00                          Center



              s                                                       

 

       Vancomyc Propensi Active        Rash   2017-0                      [facility]



       in     ty to                                               [facility]



       Analogue adverse                      00:00:                      Medical



       s      reaction                      00                          Center



              s                                                       

 

       Ondanset Propensi Active        Nausea And 2017-0                      [facility]



       evin Hcl ty to                Vomiting                         [facility]



       (Pf)   adverse                      00:00:                      Medical



              reaction                      00                          Center



              s                                                       

 

       Sulfa  Propensi Active        Other - See 2017-                      Uni

vers



       (Sulfona ty to                comments                         ity of



       mide   adverse                      00:00:                      [place]



       Antibiot reaction                      00                          Medica

l



       ics)   s                                                       Branch

 

       Sulfamet Propensi Active        Other - See 2017-0                      U

nivers



       hoprim ty to                comments                         ity of



       Ds     adverse                      00:00:                      [place]



              reaction                      00                          Medical



              s                                                       Branch

 

       Vancomyc Propensi Active        Other - See 2017-0                      U

nivers



       in     ty to                comments                         ity of



              adverse                      00:00:                      [place]



              reaction                      00                          Medical



              s                                                       Branch

 

       Sulfa  Propensi Active        Other - See                       Uni

vers



       (Sulfona ty to                comments                         ity of



       mide   adverse                      00:00:                      [place]



       Antibiot reaction                      00                          Medica

l



       ics)   s                                                       Branch

 

       SULFA  Drug   Active        Other-Cmnt                       Univer

s



       (SULFONA Class                       1-                        ity of



       MIDE                               00:00:                      [place]



       ANTIBIOT                             00                          Medical



       ICS)                                                           Branch

 

       SULFAMET DRUG   Active        Other-Cmnt                       Univ

ers



       HOPRIM                                                     ity of



       DS                                 00:00:                      [place]



                                          00                          Medical



                                                                      Branch

 

       VANCOMYC DRUG   Active        Other-Cmnt                       Univ

ers



       IN     INGREDI                                              ity of



                                          00:00:                      [place]



                                                                    Medical



                                                                      Branch

 

       Sulfamet Propensi Active        Rash                         Univer

s



       hoxazole ty to                       7-19                        ity of



              adverse                      00:00:                      [place]



              reaction                                                Medical



              s                                                       Branch

 

       Ondanset Propensi Active        Nausea                       Univer

s



       evin Hcl ty to                and/or 719                        ity of



       (Pf)   adverse               Vomiting 00:00:                      [place]



              reaction                                                Medical



              s                                                       Branch

 

       SULFAMET DRUG   Active        Rash                         Univers



       HOXAZOLE INGREDI                      7-19                        ity of



                                          00:00:                      [place]



                                                                    Medical



                                                                      Branch

 

       ONDANSET DRUG   Active        N/V                          Univers



       EVIN HCL                             7-19                        ity of



       (PF)                               00:00:                      [place]



                                          00                          Medical



                                                                      Branch

 

       Levoflox Propensi Active        Hives                        Univer

s



       acin   ty to                       5-23                        ity of



              adverse                      00:00:                      [place]



              reaction                      00                          Medical



              s                                                       Branch

 

       Morphine Propensi Active        Hives  0                      Univer

s



              ty to                       5-23                        ity of



              adverse                      00:00:                      [place]



              reaction                      00                          Medical



              s                                                       Branch

 

       Ketorola Propensi Active        Nausea                       Univer

s



       c      ty to                and/or 5-23                        ity of



       Trometha adverse               Vomiting 00:00:                      [place]



       mine   reaction                      00                          Medical



              s                                                       Branch

 

       LEVOFLOX DRUG   Active        Hives                        Univers



       ACIN   INGREDI                      5-23                        ity of



                                          00:00:                      [place]



                                          00                          Medical



                                                                      Branch

 

       MORPHINE DRUG   Active        Hives                        Univers



              INGREDI                      5-23                        ity of



                                          00:00:                      [place]



                                          00                          Medical



                                                                      Branch

 

       KETOROLA DRUG   Active        N/V                          Univers



       C      INGREDI                      5-23                        ity of



       TROMETHA                             00:00:                      [place]



       MINE                               00                          Medical



                                                                      Branch

 

       morphine Drug   Active                                           [facility]



              allergy                                                  [facility]



                                                                      [facility]



                                                                      [facility]



                                                                      [facility]



                                                                      [facility]

 

       ondanset Drug   Active                                           Common



       evin    allergy                                                  [facility]

 

       [address]  Drug   Active                                           Common



              allergy                                                  [facility]

 

       amoxicil amoxicil Active                                           Mem[facility]

a



       carine    carine                                                     l



                                                                      [facility]

 

       morphine morphine Active                                           [facility]

a



                                                                      l



                                                                      [facility]

 

       Toradol Toradol Active                                           [facility]



                                                                      l



                                                                      [place]

 

       Minocin Minocin Active                                           [facility]



                                                                      l



                                                                      [place]

 

       Zofran Zofran Active                                           [facility]



                                                                      l



                                                                      [place]

 

       Levaquin Levaquin Active                                           [facility]

a



                                                                      l



                                                                      [facility]

 

       Bactrim Bactrim Active                                           [facility]



                                                                      l



                                                                      [place]

 

       Reglan Reglan Active                                           [facility]



                                                                      l



                                                                      [facility]







Family History







           Family Member Diagnosis  Comments   Start Date Stop Date  Source

 

           Natural father Diabetes                                    [facility] [place]



                                                                  [facility] [place]

 

           Natural mother No Significant                                  Univer

sit of [place]



                      Medical Problems                                  Medical 

Branch







Social History







           Social Habit Start Date Stop Date  Quantity   Comments   Source

 

           History of tobacco                       Cigarette Smoker            

[facility]



                                                                  Branch

 

           History SDOH Social                                             Unive

rsity of



           Connections Get                                             [place] [facility]

ical



           Together                                               Branch

 

           History SDOH Social                                             Unive

rsity of



           [facility]ctions [facility] 

Medical



                                                                  Branch

 

           History SDOH Social                                             Unive

rsity of



           Connections                                             [place] [facility]



           Membership                                             Branch

 

           History SDOH Social                                             Unive

rsity of



           Connections                                             [place] [facility]



           Meetings                                               Branch

 

           Exposure to 2023 Not sure              University of



           SARS-CoV-2 (event) 00:00:00   14:24:00                         [place] 

[facility]



                                                                  Branch

 

           Alcohol intake 2023 Current drinker            Unive

rsity of



                      00:00:00   00:00:00   of alcohol            [place] [facility]



                                            (finding)             Branch

 

           History SDOH 2023 1                     University o

f



           Alcohol Frequency 00:00:00   00:00:00                         [place] [facility]

edical



                                                                  Branch

 

           History SDOH 2023 0                     University o

f



           Alcohol Std Drinks 00:00:00   00:00:00                         [place] 

[facility]



                                                                  Branch

 

           History SDOH 2023 1                     University o

f



           Alcohol Binge 00:00:00   00:00:00                         [place] [facility]

al



                                                                  Branch

 

           History SDOH Social 2023 4                     Unive

rsity of



           Connections Phone 00:00:00   00:00:00                         [place] [facility]

edical



                                                                  Branch

 

           History SDOH Social 2023 7                     Unive

rsity of



           Connections Living 00:00:00   00:00:00                         [place] 

[facility]



                                                                  Branch

 

           History SDOH 2023 0                     University o

f



           Physical Activity 00:00:00   00:00:00                         [place] [facility]

edical



           DPW                                                    Branch

 

           History SDOH 2023 0                     University o

f



           Physical Activity 00:00:00   00:00:00                         [place] [facility]

edical



           MPS                                                    Branch

 

           History SDOH 2023 5                     University o

f



           Financial  00:00:00   00:00:00                         [place] [facility]



                                                                  Branch

 

           History SDOH Food 2023 1                     Univers

ity of



           Scarcity   00:00:00   00:00:00                         [place] [facility]



                                                                  Branch

 

           History SDOH 2023 2                     University o

f



           Transport Med 00:00:00   00:00:00                         [place] [facility]

al



                                                                  Branch

 

           History SDOH 2023 2                     University o

f



           Transport Non-Med 00:00:00   00:00:00                         [place] [facility]

edical



                                                                  Branch

 

           History SDOH Food 2023 1                     Univers

ity of



           Worry      00:00:00   00:00:00                         [facility]

 

           Education  2022 21                    University of



                      00:00:00   00:00:00                         [facility]

 

           Tobacco use and 2022-10-21 2022-10-21 Smokeless             Universit

y of



           exposure   00:00:00   00:00:00   tobacco non-user            [place] [facility]

dical



                                                                  Branch

 

           Alcohol Comment 2022 once a year            Universi

ty of



                      00:00:00   00:00:00                         [facility]

 

           Social History 2022                       [facility]

apolinar



                      05:57:00   05:57:00                         

 

           Cigarettes smoked 2017                       [facility]



           current (pack per 00:00:00   00:00:00                         Medical

 Center



           day) - Reported                                             

 

           Sex Assigned At 1985                       FRANCINE [place]rr[place]ia

kes



           [facility]      00[facility]00[facility]   [facility]:00                         [facility]









                Smoking Status  Start Date      Stop Date       Source

 

                Social History  2018 11:26:10                 [facility]

[facility]







Medications







       Ordered Filled Start  Stop   Current Ordering Indication Dosage Frequency

 Signature

                    Comments            Components          Source



     Medication Medication Date Date Medication? Clinician                (SIG) 

          



     Name Name                                                   

 

     Blood-Gluco      -0      Yes                      Use as           Univ

ers



     se Meter      5-04                               directed           ity of



     (TRUE      00:00:                               to check           [place]



     METRIX      00                                 blood           Medical



     GLUCOSE                                         sugars           Branch



     METER) Misc                                         twice           



                                                  daily for           



                                                  DX E11.65           

 

     blood sugar      -0      Yes                      Use as           Univ

ers



     diagnostic      -04                               directed           ity o

f



     (TRUE      00:00:                               to check           [place]



     METRIX      00                                 blood           Medical



     GLUCOSE                                         sugars           Branch



     TEST STRIP)                                         twice           



     strip                                         daily DX           



                                                  E11.65           

 

     lancets      0      Yes                      Use as           Univers



     (TRUEPLUS      -04                               directed           ity of



     LANCETS) 30      00:00:                               to check           [place]

[place]



     gauge Misc      00                                 blood           Medical



                                                  sugars DX           Branch



                                                  E11.65           

 

     Blood-Gluco      -0      Yes                      Use as           Univ

ers



     se Meter      -04                               directed           ity of



     (TRUE      00:00:                               to check           [place]



     METRIX      00                                 blood           Medical



     GLUCOSE                                         sugars           Branch



     METER) Misc                                         twice           



                                                  daily for           



                                                  DX E11.65           

 

     blood sugar      -0      Yes                      Use as           Univ

ers



     diagnostic      -04                               directed           ity o

f



     (TRUE      00:00:                               to check           [place]



     METRIX      00                                 blood           Medical



     GLUCOSE                                         sugars           Branch



     TEST STRIP)                                         twice           



     strip                                         daily DX           



                                                  E11.65           

 

     lancets      0      Yes                      Use as           Univers



     (TRUEPLUS      -04                               directed           ity of



     LANCETS) 30      00:00:                               to check           Te

[place]



     gauge Misc      00                                 blood           Medical



                                                  sugars DX           Branch



                                                  E11.65           

 

     Blood-Gluco      -0      Yes                      Use as           Univ

ers



     se Meter      -04                               directed           ity of



     (TRUE      00:00:                               to check           [place]



     METRIX      00                                 blood           Medical



     GLUCOSE                                         sugars           Branch



     METER) Misc                                         twice           



                                                  daily for           



                                                  DX E11.65           

 

     blood sugar      -0      Yes                      Use as           Univ

ers



     diagnostic      -04                               directed           ity o

f



     (TRUE      00:00:                               to check           [place]



     METRIX      00                                 blood           Medical



     GLUCOSE                                         sugars           Branch



     TEST STRIP)                                         twice           



     strip                                         daily DX           



                                                  E11.65           

 

     lancets      -0      Yes                      Use as           Univers



     (TRUEPLUS      5-04                               directed           ity of



     LANCETS) 30      00:00:                               to check           Te

[place]



     gauge Misc      00                                 blood           Medical



                                                  sugars DX           [facility]



                                                  E11.65           

 

     Blood-Gluco      -0      Yes                      Use as           [facility]

ers



     se Meter      5-04                               directed           ity of



     (TRUE      00:00:                               to check           [place]



     METRIX      00                                 blood           Medical



     GLUCOSE                                         sugars           [facility]



     METER) Misc                                         twice           



                                                  daily for           



                                                  DX E11.65           

 

     blood sugar      -0      Yes                      Use as           [facility]

ers



     diagnostic      5-04                               directed           ity o

f



     (TRUE      00:00:                               to check           [place]



     METRIX      00                                 blood           Medical



     GLUCOSE                                         sugars           [facility]



     TEST STRIP)                                         twice           



     strip                                         daily DX           



                                                  E11.65           

 

     lancets      3-0      Yes                      Use as           [facility]



     (TRUEPLUS      5-04                               directed           [facility]



     LANCETS) 30      00:00:                               to check           [place]

[place]



     gauge Misc      00                                 blood           [facility]



                                                  sugars DX           [facility]



                                                  E11.65           

 

     Blood-Gluco      3-0      Yes                      Use as           [facility]

ers



     se Meter      5-04                               directed           ity of



     (TRUE      00:00:                               to check           [place]



     METRIX      00                                 blood           Medical



     GLUCOSE                                         sugars           [facility]



     METER) Misc                                         twice           



                                                  daily for           



                                                  DX E11.65           

 

     blood sugar      -0      Yes                      Use as           Un[facility]

ers



     diagnostic      5-04                               directed           ity o

f



     (TRUE      00:00:                               to check           [place]



     METRIX      00                                 blood           Medical



     GLUCOSE                                         sugars           [facility]



     TEST STRIP)                                         twice           



     strip                                         daily DX           



                                                  E11.65           

 

     lancets      -0      Yes                      Use as           [facility]



     (TRUEPLUS      5-04                               directed           ity of



     LANCETS) 30      00:00:                               to check           [facility]

[place]



     gauge Misc      00                                 blood           [facility]



                                                  sugars DX           [facility]



                                                  E11.65           

 

     Blood-Gluco      -0      Yes                      Use as           [facility]

ers



     se Meter      5-04                               directed           ity of



     (TRUE      00:00:                               to check           [place]



     METRIX      00                                 blood           Medical



     GLUCOSE                                         sugars           [facility]



     METER) Misc                                         twice           



                                                  daily for           



                                                  DX E11.65           

 

     blood sugar      -0      Yes                      Use as           Un[facility]

ers



     diagnostic      5-04                               directed           ity o

f



     (TRUE      00:00:                               to check           [place]



     METRIX      00                                 blood           Medical



     GLUCOSE                                         sugars           [facility]



     TEST STRIP)                                         twice           



     strip                                         daily DX           



                                                  E11.65           

 

     lancets      -0      Yes                      Use as           [facility]



     (TRUEPLUS      5-04                               directed           ity of



     LANCETS) 30      00:00:                               to check           [facility]

[place]



     gauge Misc      00                                 blood           [facility]



                                                  sugars DX           [facility]



                                                  E11.65           

 

     Blood-Gluco      3-0      Yes                      Use as           [facility]

ers



     se Meter      5-04                               directed           ity of



     (TRUE      00:00:                               to check           [place]



     METRIX      00                                 blood           Medical



     GLUCOSE                                         sugars           [facility]



     METER) Misc                                         twice           



                                                  daily for           



                                                  DX E11.65           

 

     blood sugar      3-0      Yes                      Use as           [facility]

ers



     diagnostic      5-04                               directed           ity o

f



     (TRUE      00:00:                               to check           [place]



     METRIX      00                                 blood           Medical



     GLUCOSE                                         sugars           [facility]



     TEST STRIP)                                         twice           



     strip                                         daily DX           



                                                  E11.65           

 

     lancets      3-0      Yes                      Use as           [facility]



     (TRUEPLUS      5-04                               directed           [facility] of



     LANCETS) 30      00:00:                               to check           [facility]

[place]



     gauge Misc      00                                 blood           Medical



                                                  sugars DX           [facility]



                                                  E11.65           

 

     Blood-Gluco            Yes                      Use as           [facility]

ers



     se Meter      -                               directed           ity of



     (TRUE      00:00:                               to check           [place]



     METRIX      00                                 blood           Medical



     GLUCOSE                                         sugars           [facility]



     METER) Misc                                         twice           



                                                  daily for           



                                                  DX E11.65           

 

     blood sugar            Yes                      Use as           [facility]

ers



     diagnostic                                     directed           ity o

f



     (TRUE      00:00:                               to check           [place]



     METRIX      00                                 blood           Medical



     GLUCOSE                                         sugars           [facility]



     TEST STRIP)                                         twice           



     strip                                         daily DX           



                                                  E11.65           

 

     lancets            Yes                      Use as           [facility]



     (TRUEPLUS                                     directed           ity of



     LANCETS) 30      00:00:                               to check           [facility]

[place]



     gauge Misc      00                                 blood           Medical



                                                  sugars DX           [facility]



                                                  E11.65           

 

     FENTanyl PF      2023- No             12.5ug      12.5 mcg,         

  [facility]



     (SUBLIMAZE                                Slow IV           ity o

f



     (PF))      23:15: 23:38                          Push,           [place]



     injection      00   :00                           ONCE, 1           Medical



     12.5 mcg                                         dose, On           [facility]



                                                  23 at           



                                                  1815, STAT           

 

     NaCl 0.9%      2023- No             1000mL      at 999           [facility]

[facility]



     (NS) bolus                                mL/hr,           ity of



     infusion      21:00: 23:29                          1,000 mL,           [facility]

as



     1,000 mL      00   :00                           IV             Medical



                                                  Infusion,           [facility]



                                                  ONCE, 1           



                                                  dose, On           



                                                  23 at           



                                                  1600, ASAP           

 

     FENTanyl PF      2023- No             25ug      25 mcg,           Un

[facility]



     (SUBLIMAZE                                Slow IV           ity o

f



     (PF))      20:15: 20:34                          Push,           [place]



     injection      00   :00                           ONCE, 1           Medical



     25 mcg                                         dose, On           [facility]



                                                  23 at           



                                                  1515, STAT           

 

     blood sugar            Yes                      Use as           [facility]

ers



     diagnostic                                     directed           ity o

f



     (ONETOUCH      00:00:                                              [place]



     VERIO TEST      00                                                Medical



     STRIPS)                                                        [facility]



     strip                                                        

 

     insulin            Yes            60U       inject 60           Unive

rs



     lispro,      4-25                               Units           ity of



     human,      00:00:                               under the           [place]



     (HUMALOG      00                                 skin in           Medical



     U-100                                         the            [facility]



     INSULIN)                                         morning           



     100 unit/mL                                         and 60           



     injection                                         Units in           



                                                  the            



                                                  evening.           



                                                  inject           



                                                  with           



                                                  meals.           

 

     blood sugar            Yes                      Use as           [facility]

ers



     diagnostic      4-25                               directed           ity o

f



     (ONETOUCH      00:00:                                              [place]



     VERIO TEST      00                                                Medical



     STRIPS)                                                        [facility]



     strip                                                        

 

     insulin            Yes            60U       inject 60           Unive

rs



     lispro,      4-25                               Units           ity of



     human,      00:00:                               under the           [place]



     (HUMALOG      00                                 skin in           Medical



     U-100                                         the            [facility]



     INSULIN)                                         morning           



     100 unit/mL                                         and 60           



     injection                                         Units in           



                                                  the            



                                                  evening.           



                                                  inject           



                                                  with           



                                                  meals.           

 

     blood sugar            Yes                      Use as           Univ

ers



     diagnostic      4-25                               directed           ity o

f



     (ONETOUCH      00:00:                                              [place]



     VERIO TEST      00                                                Medical



     STRIPS)                                                        [facility]



     strip                                                        

 

     insulin            Yes            60U       inject 60           Unive

rs



     lispro,      4-25                               Units           ity of



     human,      00:00:                               under the           [place]



     (HUMALOG      00                                 skin in           Medical



     U-100                                         the            [facility]



     INSULIN)                                         morning           



     100 unit/mL                                         and 60           



     injection                                         Units in           



                                                  the            



                                                  evening.           



                                                  inject           



                                                  with           



                                                  meals.           

 

     blood sugar            Yes                      Use as           Univ

ers



     diagnostic      4-25                               directed           ity o

f



     (ONETOUCH      00:00:                                              [place]



     VERIO TEST      00                                                Medical



     STRIPS)                                                        [facility]



     strip                                                        

 

     insulin            Yes            60U       inject 60           Unive

rs



     lispro,      4-25                               Units           ity of



     human,      00:00:                               under the           [place]



     (HUMALOG      00                                 skin in           Medical



     U-100                                         the            [facility]



     INSULIN)                                         morning           



     100 unit/mL                                         and 60           



     injection                                         Units in           



                                                  the            



                                                  evening.           



                                                  inject           



                                                  with           



                                                  meals.           

 

     blood sugar            Yes                      Use as           Un[facility]

ers



     diagnostic      4-25                               directed           ity o

f



     (ONETOUCH      00:00:                                              [place]



     VERIO TEST      00                                                Medical



     STRIPS)                                                        [facility]



     strip                                                        

 

     insulin            Yes            60U       inject 60           Unive

rs



     lispro,      4-25                               Units           ity of



     human,      00:00:                               under the           [place]



     (HUMALOG      00                                 skin in           Medical



     U-100                                         the            [facility]



     INSULIN)                                         morning           



     100 unit/mL                                         and 60           



     injection                                         Units in           



                                                  the            



                                                  evening.           



                                                  inject           



                                                  with           



                                                  meals.           

 

     insulin            Yes            60U       inject 60           Unive

rs



     lispro,      4-25                               Units           ity of



     human,      00:00:                               under the           [place]



     (HUMALOG      00                                 skin in           Medical



     U-100                                         the            [facility]



     INSULIN)                                         morning           



     100 unit/mL                                         and 60           



     injection                                         Units in           



                                                  the            



                                                  evening.           



                                                  inject           



                                                  with           



                                                  meals.           

 

     insulin            Yes            60U       inject 60           Unive

rs



     lispro,      4-25                               Units           ity of



     human,      00:00:                               under the           [place]



     (HUMALOG      00                                 skin in           Medical



     U-100                                         the            [facility]



     INSULIN)                                         morning           



     100 unit/mL                                         and 60           



     injection                                         Units in           



                                                  the            



                                                  evening.           



                                                  inject           



                                                  with           



                                                  meals.           

 

     insulin            Yes            60U       inject 60           Unive

rs



     lispro,      4-25                               Units           ity of



     human,      00:00:                               under the           [place]



     (HUMALOG      00                                 skin in           Medical



     U-100                                         the            [facility]



     INSULIN)                                         morning           



     100 unit/mL                                         and 60           



     injection                                         Units in           



                                                  the            



                                                  evening.           



                                                  inject           



                                                  with           



                                                  meals.           

 

     insulin            Yes            60U       inject 60           Unive

rs



     lispro,      4-25                               Units           ity of



     human,      00:00:                               under the           [place]



     (HUMALOG      00                                 skin in           Medical



     U-100                                         the            [facility]anch



     INSULIN)                                         morning           



     100 unit/mL                                         and 60           



     injection                                         Units in           



                                                  the            



                                                  evening.           



                                                  inject           



                                                  with           



                                                  meals.           

 

     insulin            Yes            60U       inject 60           Unive

rs



     lispro,      4-25                               Units           ity of



     human,      00:00:                               under the           [place]



     (HUMALOG      00                                 skin in           Medical



     U-100                                         the            [facility]



     INSULIN)                                         morning           



     100 unit/mL                                         and 60           



     injection                                         Units in           



                                                  the            



                                                  evening.           



                                                  inject           



                                                  with           



                                                  meals.           

 

     insulin            Yes            60U       inject 60           Unive

rs



     lispro,      4-25                               Units           ity of



     human,      00:00:                               under the           [place]



     (HUMALOG      00                                 skin in           Medical



     U-100                                         the            [facility]



     INSULIN)                                         morning           



     100 unit/mL                                         and 60           



     injection                                         Units in           



                                                  the            



                                                  evening.           



                                                  inject           



                                                  with           



                                                  meals.           

 

     insulin            Yes            60U       inject 60           Unive

rs



     lispro,      4-25                               Units           ity of



     human,      00:00:                               under the           [place]



     (HUMALOG      00                                 skin in           Medical



     U-100                                         the            [facility]



     INSULIN)                                         morning           



     100 unit/mL                                         and 60           



     injection                                         Units in           



                                                  the            



                                                  evening.           



                                                  inject           



                                                  with           



                                                  meals.           

 

     insulin            Yes            60U       inject 60           Un[facility]

rs



     lispro,      4-25                               Units           ity of



     human,      00:00:                               under the           [place]



     (HUMALOG      00                                 skin in           Medical



     U-100                                         the            [facility]



     INSULIN)                                         morning           



     100 unit/mL                                         and 60           



     injection                                         Units in           



                                                  the            



                                                  evening.           



                                                  inject           



                                                  with           



                                                  meals.           

 

     semaglutide      2023- Yes                      inject           Uni

vers



     (OZEMPIC)      -19                          0.25 mg           ity of



     0.25 mg or      00:00: 04:59                          under the           [facility]

[facility]



     0.5 mg (2      00   :00                           skin           Medical



     mg/3 mL)                                         weekly for           [facility]

h



     PnIj                                         28 days,           



                                                  THEN 0.5           



                                                  mg weekly           



                                                  for 56           



                                                  days.           

 

     semaglutide      2023- Yes                      inject           Uni

vers



     (OZEMPIC)      -19                          0.25 mg           ity of



     0.25 mg or      00:00: 04:59                          under the           [facility]

[facility]



     0.5 mg (2      00   :00                           skin           Medical



     mg/3 mL)                                         weekly for           [facility]

h



     PnIj                                         28 days,           



                                                  THEN 0.5           



                                                  mg weekly           



                                                  for 56           



                                                  days.           

 

     semaglutide      2023- Yes                      inject           Uni

vers



     (OZEMPIC)      -19                          0.25 mg           ity of



     0.25 mg or      00:00: 04:59                          under the           [facility]

[facility]



     0.5 mg (2      00   :00                           skin           Medical



     mg/3 mL)                                         weekly for           [facility]

h



     PnIj                                         28 days,           



                                                  THEN 0.5           



                                                  mg weekly           



                                                  for 56           



                                                  days.           

 

     semaglutide      2023- Yes                      inject           Uni

vers



     (OZEMPIC)      -                          0.25 mg           ity of



     0.25 mg or      00:00: 04:59                          under the           [facility]

[facility]



     0.5 mg (2      00   :00                           skin           Medical



     mg/3 mL)                                         weekly for           [facility]

h



     PnIj                                         28 days,           



                                                  THEN 0.5           



                                                  mg weekly           



                                                  for 56           



                                                  days.           

 

     semaglutide      2023- Yes                      inject           Uni

vers



     (OZEMPIC)      -                          0.25 mg           ity of



     0.25 mg or      00:00: 04:59                          under the           T

[facility]



     0.5 mg (2      00   :00                           skin           Medical



     mg/3 mL)                                         weekly for           [facility]

h



     PnIj                                         28 days,           



                                                  THEN 0.5           



                                                  mg weekly           



                                                  for 56           



                                                  days.           

 

     semaglutide      2023- Yes                      inject           Uni

vers



     (OZEMPIC)      -19                          0.25 mg           ity of



     0.25 mg or      00:00: 04:59                          under the           T

[facility]



     0.5 mg (2      00   :00                           skin           Medical



     mg/3 mL)                                         weekly for           [facility]

h



     PnIj                                         28 days,           



                                                  THEN 0.5           



                                                  mg weekly           



                                                  for 56           



                                                  days.           

 

     semaglutide      2023- Yes                      inject           Uni

vers



     (OZEMPIC)      4--19                          0.25 mg           ity of



     0.25 mg or      00:00: 04:59                          under the           [facility]

[facility]



     0.5 mg (2      00   :00                           skin           Medical



     mg/3 mL)                                         weekly for           [facility]

h



     PnIj                                         28 days,           



                                                  THEN 0.5           



                                                  mg weekly           



                                                  for 56           



                                                  days.           

 

     semaglutide      2023- Yes                      inject           Uni

vers



     (OZEMPIC)      -19                          0.25 mg           ity of



     0.25 mg or      00:00: 04:59                          under the           [facility]

[facility]



     0.5 mg (2      00   :00                           skin           Medical



     mg/3 mL)                                         weekly for           [facility]

h



     PnIj                                         28 days,           



                                                  THEN 0.5           



                                                  mg weekly           



                                                  for 56           



                                                  days.           

 

     semaglutide      2023- Yes                      inject           Uni

vers



     (OZEMPIC)      --19                          0.25 mg           ity of



     0.25 mg or      00:00: 04:59                          under the           [facility]

[facility]



     0.5 mg (2      00   :00                           skin           Medical



     mg/3 mL)                                         weekly for           [facility]

h



     PnIj                                         28 days,           



                                                  THEN 0.5           



                                                  mg weekly           



                                                  for 56           



                                                  days.           

 

     semaglutide      2023- Yes                      inject           Uni

vers



     (OZEMPIC)      -                          0.25 mg           ity of



     0.25 mg or      00:00: 04:59                          under the           [facility]

[facility]



     0.5 mg (2      00   :00                           skin           Medical



     mg/3 mL)                                         weekly for           [facility]

h



     PnIj                                         28 days,           



                                                  THEN 0.5           



                                                  mg weekly           



                                                  for 56           



                                                  days.           

 

     semaglutide      2023- Yes                      inject           Uni

vers



     (OZEMPIC)      -                          0.25 mg           ity of



     0.25 mg or      00:00: 04:59                          under the           [facility]

[facility]



     0.5 mg (2      00   :00                           skin           Medical



     mg/3 mL)                                         weekly for           [facility]

h



     PnIj                                         28 days,           



                                                  THEN 0.5           



                                                  mg weekly           



                                                  for 56           



                                                  days.           

 

     semaglutide      2023- Yes                      inject           Uni

vers



     (OZEMPIC)      -19                          0.25 mg           ity of



     0.25 mg or      00:00: 04:59                          under the           [facility]

[facility]



     0.5 mg (2      00   :00                           skin           Medical



     mg/3 mL)                                         weekly for           [facility]

h



     PnIj                                         28 days,           



                                                  THEN 0.5           



                                                  mg weekly           



                                                  for 56           



                                                  days.           

 

     semaglutide      2023- Yes                      inject           Uni

vers



     (OZEMPIC)      -19                          0.25 mg           ity of



     0.25 mg or      00:00: 04:59                          under the           [facility]

[facility]



     0.5 mg (2      00   :00                           skin           Medical



     mg/3 mL)                                         weekly for           [facility]

h



     PnIj                                         28 days,           



                                                  THEN 0.5           



                                                  mg weekly           



                                                  for 56           



                                                  days.           

 

     blood sugar      2023- No                       Use as           Uni

vers



     diagnostic      25 05-04                          directed           ity 

of



     (ONETOUCH      00:00: 00:00                                         [place]



     VERIO TEST      00   :00                                          Medical



     STRIPS)                                                        [facility]



     strip                                                        

 

     insulin NPH      2023- No             45U       inject 45           

[facility]



     human      4-20 04-20                          Units           ity of



     isophane      20:12: 00:00                          under the           [facility]

as



     (NOVOLIN N      01   :00                           skin 2           Medical



     SC)                                          (two)           [facility]



                                                  times           



                                                  daily.           

 

     insulin NPH            Yes       [id number] 60U       inject 60        

   [facility]



     100 unit/mL      4-20                               Units           ity of



     injection      00:00:                               under the           [facility]

as



               00                                 skin every           Medical



                                                  morning           [facility]



                                                  and            



                                                  evening.           

 

     insulin NPH      0      Yes       [id number] 60U       inject 60        

   [facility]



     100 unit/mL      4-20                               Units           ity of



     injection      00:00:                               under the           [facility]

as



               00                                 skin every           Medical



                                                  morning           [facility]



                                                  and            



                                                  evening.           

 

     insulin NPH      0      Yes       [id number] 60U       inject 60        

   [facility]



     100 unit/mL      4-20                               Units           ity of



     injection      00:00:                               under the           [facility]

as



               00                                 skin every           Medical



                                                  morning           [facility]



                                                  and            



                                                  evening.           

 

     insulin NPH      0      Yes       [id number] 60U       inject 60        

   [facility]



     100 unit/mL      4-20                               Units           ity of



     injection      00:00:                               under the           [facility]

as



               00                                 skin every           Medical



                                                  morning           [facility]



                                                  and            



                                                  evening.           

 

     insulin NPH      0      Yes       [id number] 60U       inject 60        

   [facility]



     100 unit/mL      4-20                               Units           ity of



     injection      00:00:                               under the           [facility]

[facility]



               00                                 skin every           Medical



                                                  morning           [facility]



                                                  and            



                                                  evening.           

 

     insulin NPH      -0      Yes       [id number] 60U       inject 60        

   [facility]



     100 unit/mL      4-20                               Units           ity of



     injection      00:00:                               under the           [facility]

[facility]



               00                                 skin every           Medical



                                                  morning           [facility]



                                                  and            



                                                  evening.           

 

     insulin NPH      0      Yes       [id number] 60U       inject 60        

   [facility]



     100 unit/mL      4-20                               Units           ity of



     injection      00:00:                               under the           [facility]

[facility]



               00                                 skin every           Medical



                                                  morning           [facility]



                                                  and            



                                                  evening.           

 

     insulin NPH      0      Yes       [id number] 60U       inject 60        

   [facility]



     100 unit/mL      4-20                               Units           ity of



     injection      00:00:                               under the           [facility]

[facility]



               00                                 skin every           Medical



                                                  morning           [facility]



                                                  and            



                                                  evening.           

 

     insulin NPH      -0      Yes       [id number] 60U       inject 60        

   [facility]



     100 unit/mL      4-20                               Units           ity of



     injection      00:00:                               under the           [facility]

as



               00                                 skin every           Medical



                                                  morning           [facility]



                                                  and            



                                                  evening.           

 

     insulin NPH      -0      Yes       [id number] 60U       inject 60        

   [facility]



     100 unit/mL      4-20                               Units           ity of



     injection      00:00:                               under the           [facility]

as



               00                                 skin every           Medical



                                                  morning           [facility]



                                                  and            



                                                  evening.           

 

     insulin NPH      -0      Yes       [id number] 60U       inject 60        

   [facility]



     100 unit/mL      4-20                               Units           ity of



     injection      00:00:                               under the           [facility]

[facility]



               00                                 skin every           Medical



                                                  morning           [facility]



                                                  and            



                                                  evening.           

 

     insulin NPH      -0      Yes       [id number] 60U       inject 60        

   [facility]



     100 unit/mL      4-20                               Units           ity of



     injection      00:00:                               under the           [facility]

as



               00                                 skin every           Medical



                                                  morning           [facility]



                                                  and            



                                                  evening.           

 

     insulin NPH      -0      Yes       [id number] 60U       inject 60        

   [facility]



     100 unit/mL      4-20                               Units           ity of



     injection      00:00:                               under the           [facility]

as



               00                                 skin every           Medical



                                                  morning           [facility]



                                                  and            



                                                  evening.           

 

     insulin NPH      -0      Yes       [id number] 60U       inject 60        

   [facility]



     100 unit/mL      4-20                               Units           ity of



     injection      00:00:                               under the           [facility]

as



               00                                 skin every           Medical



                                                  morning           [facility]



                                                  and            



                                                  evening.           

 

     insulin NPH      0      Yes       [id number] 60U       inject 60        

   [facility]



     100 unit/mL      4-20                               Units           ity of



     injection      00:00:                               under the           [facility]

as



               00                                 skin every           Medical



                                                  morning           [facility]



                                                  and            



                                                  evening.           

 

     cephALEXin      2023- No             500mg      500 mg,           Un

[facility]



     (KEFLEX)                                Oral,           ity of



     capsule 500      23:45: 23:53                          ONCE, 1           [facility]

[facility]



     mg        00   :00                           dose, On           Medical



                                                  Wed            [facility]



                                                  23 at           



                                                  1845,           



                                                  ASAP<br>Re           



                                                  ason for           



                                                  Anti-Infec           



                                                  tive:           



                                                  Documented           



                                                  Infection<           



                                                  br>Documen           



                                                  huma            



                                                  Infection           



                                                  Site:           



                                                  Urine<br>D           



                                                  uration of           



                                                  Therapy:           



                                                  Other (see           



                                                  Comments)           

 

     insulin      2023- No             6U        6 Units,           Unive

rs



     regular                                Slow IV           ity of



     human      23:15: 22:41                          Push,           [place]



     (HUMULIN R)      00   :00                           ONCE, 1           Medic

al



     injection 6                                         dose, On           [facility]

ch



     Units                                         Wed            



                                                  23 at           



                                                  1815,           



                                                  STAT<br>In           



                                                  dication           



                                                  for            



                                                  insulin:           



                                                  Hyperglyce           



                                                  jarvis            

 

     NaCl 0.9%      2023- No             1000mL      at 999           Uni

vers



     (NS) bolus                                mL/hr,           ity of



     infusion      22:30: 23:56                          1,000 mL,           [facility]

as



     1,000 mL      00   :00                           IV             Medical



                                                  Infusion,           [facility]



                                                  ONCE, 1           



                                                  dose, On           



                                                  23 at           



                                                  1730, STAT           

 

     acetaminoph      2023- No             1000mg      1,000 mg,         

  [facility]



     en                                  Oral,           [facility]



     (TYLENOL)      21:45: 21:38                          ONCE, 1           [place]

[facility]



     tablet      00   :00                           dose, On           [facility]



     1,000 mg                                         Wed            [facility]



                                                  23 at           



                                                  1645, ASAP           

 

     NaCl 0.9%      2023- No             1000mL      at 999           [facility]

vers



     (NS) bolus                                mL/hr,           [facility]



     infusion      21:45: 23:56                          1,000 mL,           [facility]

as



     1,000 mL      00   :00                           IV             Medical



                                                  Infusion,           [facility]



                                                  ONCE, 1           



                                                  dose, On           



                                                  23 at           



                                                  1645, STAT           

 

     cephALEXin      3-0      Yes       [id number] 500mg      Take 1           

[facility]



     (KEFLEX)      4-19                               capsule by           ity o

f



     500 mg      00:00:                               mouth 4           [place]



     capsule      00                                 (four)           [facility]



                                                  times           [facility]



                                                  daily.           

 

     cephALEXin      2023-0      Yes       [id number] 500mg      Take 1           

[facility]



     (KEFLEX)      4-19                               capsule by           ity o

f



     500 mg      00:00:                               mouth 4           [place]



     capsule      00                                 (four)           Medical



                                                  times           [facility]



                                                  daily.           

 

     cephALEXin      2023-0      Yes       [id number] 500mg      Take 1           

[facility]



     (KEFLEX)      4-19                               capsule by           ity o

f



     500 mg      00:00:                               mouth 4           [place]



     capsule      00                                 (four)           Medical



                                                  times           [facility]



                                                  daily.           

 

     cephALEXin      2023-0      Yes       [id number] 500mg      Take 1           

[facility]



     (KEFLEX)      4-19                               capsule by           ity o

f



     500 mg      00:00:                               mouth 4           [place]



     capsule      00                                 (four)           Medical



                                                  times           [facility]



                                                  daily.           

 

     cephALEXin      2023-0      Yes       [id number] 500mg      Take 1           

[facility]



     (KEFLEX)      4-19                               capsule by           ity o

f



     500 mg      00:00:                               mouth 4           [place]



     capsule      00                                 (four)           Medical



                                                  times           [facility]



                                                  daily.           

 

     cephALEXin      2023-0      Yes       [id number] 500mg      Take 1           

[facility]



     (KEFLEX)      4-19                               capsule by           ity o

f



     500 mg      00:00:                               mouth 4           [place]



     capsule      00                                 (four)           Medical



                                                  times           [facility]



                                                  daily.           

 

     cephALEXin      2023-0      Yes       [id number] 500mg      Take 1           

[facility]



     (KEFLEX)      4-19                               capsule by           ity o

f



     500 mg      00:00:                               mouth 4           [place]



     capsule      00                                 (four)           Medical



                                                  times           [facility]



                                                  daily.           

 

     cephALEXin      2023-0      Yes       [id number] 500mg      Take 1           

[facility]



     (KEFLEX)      4-19                               capsule by           ity o

f



     500 mg      00:00:                               mouth 4           [place]



     capsule      00                                 (four)           Medical



                                                  times           [facility]



                                                  daily.           

 

     cephALEXin      2023-0      Yes       [id number] 500mg      Take 1           

[facility]



     (KEFLEX)      4-19                               capsule by           ity o

f



     500 mg      00:00:                               mouth 4           [place]



     capsule      00                                 (four)           Medical



                                                  times           [facility]



                                                  daily.           

 

     cephALEXin      2023-0      Yes       [id number] 500mg      Take 1           

[facility]



     (KEFLEX)      4-19                               capsule by           ity o

f



     500 mg      00:00:                               mouth 4           [place]



     capsule      00                                 (four)           Medical



                                                  times           [facility]



                                                  daily.           

 

     cephALEXin      2023-0      Yes       [id number] 500mg      Take 1           

[facility]



     (KEFLEX)      4-19                               capsule by           ity o

f



     500 mg      00:00:                               mouth 4           [place]



     capsule      00                                 (four)           Medical



                                                  times           [facility]



                                                  daily.           

 

     cephALEXin      2023-0      Yes       [id number] 500mg      Take 1           

[facility]



     (KEFLEX)      4-19                               capsule by           ity o

f



     500 mg      00:00:                               mouth 4           [place]



     capsule      00                                 (four)           Medical



                                                  times           [facility]



                                                  daily.           

 

     cephALEXin      2023-0      Yes       [id number] 500mg      Take 1           

[facility]



     (KEFLEX)      4-19                               capsule by           ity o

f



     500 mg      00:00:                               mouth 4           [place]



     capsule      00                                 (four)           Medical



                                                  times           [facility]



                                                  daily.           

 

     cephALEXin      2023-0      Yes       [id number] 500mg      Take 1           

[facility]



     (KEFLEX)      4-19                               capsule by           ity o

f



     500 mg      00:00:                               mouth 4           [place]



     capsule      00                                 (four)           Medical



                                                  times           [facility]



                                                  daily.           

 

     cephALEXin      2023-0      Yes       [id number] 500mg      Take 1           

[facility]



     (KEFLEX)      4-19                               capsule by           ity o

f



     500 mg      00:00:                               mouth 4           [place]



     capsule      00                                 (four)           Medical



                                                  times           [facility]



                                                  daily.           

 

     cephALEXin      2023-0      Yes       [id number] 500mg      Take 1           

[facility]



     (KEFLEX)      4-19                               capsule by           ity o

f



     500 mg      00:00:                               mouth 4           [place]



     capsule      00                                 (four)           Medical



                                                  times           [facility]



                                                  daily.           

 

     cefpodoxime      2023-0      Yes            200mg      Take 1           [facility]

vers



     200 mg      4-16                               tablet by           ity of



     tablet      00:00:                               mouth in           [place]



               00                                 the            Medical



                                                  morning           [place]



                                                  and 1           



                                                  tablet in           



                                                  the            



                                                  evening.           

 

     cefpodoxime      2023-0      Yes            200mg      Take 1           [facility]

vers



     200 mg      4-16                               tablet by           ity of



     tablet      00:00:                               mouth in           [place]



               00                                 the            Medical



                                                  morning           [place]



                                                  and 1           



                                                  tablet in           



                                                  the            



                                                  evening.           

 

     cefpodoxime      2023-0      Yes            200mg      Take 1           [facility]

vers



     200 mg      4-16                               tablet by           ity of



     tablet      00:00:                               mouth in           [place]



               00                                 the            [facility]



                                                  and 1           



                                                  tablet in           



                                                  the            



                                                  evening.           

 

     cefpodoxime      2023-0      Yes            200mg      Take 1           [facility]

vers



     200 mg      4-16                               tablet by           ity of



     tablet      00:00:                               mouth in           [place]



               00                                 the            [facility]



                                                  and 1           



                                                  tablet in           



                                                  the            



                                                  evening.           

 

     cefpodoxime      2023-0      Yes            200mg      Take 1           [facility]

vers



     200 mg      4-16                               tablet by           ity of



     tablet      00:00:                               mouth in           [place]



               00                                 the            [facility]



                                                  and 1           



                                                  tablet in           



                                                  the            



                                                  evening.           

 

     cefpodoxime      2023-0      Yes            200mg      Take 1           [facility]

vers



     200 mg      4-16                               tablet by           ity of



     tablet      00:00:                               mouth in           [place]



               00                                 the            [facility]



                                                  and 1           



                                                  tablet in           



                                                  the            



                                                  evening.           

 

     cefpodoxime      2023-0      Yes            200mg      Take 1           [facility]

vers



     200 mg      4-16                               tablet by           ity of



     tablet      00:00:                               mouth in           [address]



                                                  and 1           



                                                  tablet in           



                                                  the            



                                                  evening.           

 

     FENTanyl PF      2023- No             50ug      50 mcg,           [facility]

[facility]



     (SUBLIMAZE      4-10 04-10                          Slow IV           ity o

f



     (PF))      02:15: 02:39                          Push,           [place]



     injection      00   :00                           ONCE, 1           Medical



     50 mcg                                         dose, On           [facility]



                                                  Sun 23           



                                                  at 2115,           



                                                  Routine           

 

     insulin      2023- No             14U       14 Units,           Un[facility]

ers



     regular      4-10 04-10                          Subcutaneo           ity o

f



     human      01:30: 00:59                          , ONCE,           [place]



     (HUMULIN R)      00   :00                           1 dose, On           Me

dical



     injection                                         Sun 23           [facility]

ch



     14 Units                                         at 2030,           



                                                  Routine<br           



                                                  >Indicatio           



                                                  n for           



                                                  insulin:           



                                                  Hyperglyce           



                                                  jarvis            

 

     cefTRIAXone      -0 - No             1000mg      1,000 mg,         

  [facility]



     (ROCEPHIN)      4-10 04-10                          IV             ity of



     1,000 mg in      01:00: 01:31                          P[place]gyback,          

 [place]



     NaCl 0.9%      00   :00                           ONCE, 1           Medical



     (NS) 100 mL                                         dose, On           [facility]

ch



     MINI-BAG                                         Sun 23           



                                                  at 2000,           



                                                  Administer           



                                                  over 30           



                                                  Minutes,           



                                                  100            



                                                  mL<br>Reas           



                                                  on for           



                                                  Anti-Infec           



                                                  tive:           



                                                  Documented           



                                                  Infection<           



                                                  br>Documen           



                                                  huma            



                                                  Infection           



                                                  Site:           



                                                  Urine<br&g           



                                                  t;Duration           



                                                  of             



                                                  Therapy: 7           



                                                  days           

 

     NaCl 0.9%      -0 - No             500mL      at 999           [facility]

ers



     (NS) bolus      4-10 04-10                          mL/hr, 500           it

y of



     infusion      01:00: 02:45                          mL, IV           [place]



     500 mL      00   :00                           Infusion,           Medical



                                                  ONCE, 1           [facility]



                                                  dose, On           



                                                  Sun 23           



                                                  at 2000,           



                                                  STAT           

 

     NaCl 0.9%      -0 - No             1000mL      at 999           [facility]

vers



     (NS) bolus      4-09 04-10                          mL/hr,           ity of



     infusion      23:15: 03:46                          1,000 mL,           [facility]

as



     1,000 mL      00   :00                           IV             Medical



                                                  Infusion,           [facility]



                                                  ONCE, 1           



                                                  dose, On           



                                                  Sun 23           



                                                  at 1815,           



                                                  ASAP           

 

     FENTanyl PF      2023- No             50ug      50 mcg,           Un

[facility]



     (SUBLIMAZE       04-09                          Slow IV           ity o

f



     (PF))      23:15: 23:11                          Push,           [place]



     injection      00   :00                           ONCE, 1           Medical



     50 mcg                                         dose, On           [facility]



                                                  Sun 23           



                                                  at 1815,           



                                                  Routine           

 

     insulin NPH      -0      Yes            45U       inject 45           U

nivers



     human      4-09                               Units           ity of



     isophane      21:52:                               under the           [place]

[facility]



     (NOVOLIN N      29                                 skin 2           Medical



     SC)                                          (two)           [facility]



                                                  times           



                                                  daily.           

 

     insulin NPH      3-0      Yes            45U       inject 45           U

nivers



     human      4-09                               Units           ity of



     isophane      21:52:                               under the           [place]

[facility]



     (NOVOLIN N      29                                 skin 2           Medical



     SC)                                          (two)           [facility]



                                                  times           



                                                  daily.           

 

     insulin NPH      2023-0      Yes            45U       inject 45           U

nivers



     human      4-09                               Units           ity of



     isophane      21:52:                               under the           [place]

[facility]



     (NOVOLIN N      29                                 skin 2           Medical



     SC)                                          (two)           [facility]



                                                  times           



                                                  daily.           

 

     insulin NPH      2023-0      Yes            45U       inject 45           U

nivers



     human      4-09                               Units           ity of



     isophane      21:52:                               under the           [place]

[facility]



     (NOVOLIN N      29                                 skin 2           Medical



     SC)                                          (two)           [facility]



                                                  times           



                                                  daily.           

 

     insulin NPH      2023-0      Yes            45U       inject 45           U

nivers



     human      4-09                               Units           ity of



     isophane      21:52:                               under the           [place]

[facility]



     (NOVOLIN N      29                                 skin 2           Medical



     SC)                                          (two)           [facility]



                                                  times           



                                                  daily.           

 

     insulin NPH      2023-0      Yes            45U       inject 45           U

nivers



     human      4-09                               Units           ity of



     isophane      21:52:                               under the           [place]

[facility]



     (NOVOLIN N      29                                 skin 2           Medical



     SC)                                          (two)           [facility]



                                                  times           



                                                  daily.           

 

     insulin NPH      3-0      Yes            45U       inject 45           U

nivers



     human      4-09                               Units           ity of



     isophane      21:52:                               under the           [place]

[facility]



     (NOVOLIN N      29                                 skin 2           Medical



     SC)                                          (two)           [facility]



                                                  times           



                                                  daily.           

 

     insulin NPH      3-0      Yes            45U       inject 45           U

nivers



     human      4-09                               Units           ity of



     isophane      21:52:                               under the           [place]

[facility]



     (NOVOLIN N      29                                 skin 2           Medical



     SC)                                          (two)           [facility]



                                                  times           



                                                  daily.           

 

     insulin NPH      3-0      Yes            45U       inject 45           U

nivers



     human      4-09                               Units           ity of



     isophane      21:52:                               under the           [place]

[facility]



     (NOVOLIN N      29                                 skin 2           Medical



     SC)                                          (two)           [facility]



                                                  times           



                                                  daily.           

 

     insulin NPH      3-0      Yes       [id number] 45U       inject 45        

   [facility]



     100 unit/mL      4-09                               Units           ity of



     injection      00:00:                               under the           [facility]

as



               00                                 skin every           Medical



                                                  morning           [facility]



                                                  and            



                                                  evening.           

 

     insulin NPH      3-0      Yes       [id number] 45U       inject 45        

   [facility]



     100 unit/mL      4-09                               Units           ity of



     injection      00:00:                               under the           [facility]

as



               00                                 skin every           Medical



                                                  morning           [facility]



                                                  and            



                                                  evening.           

 

     insulin NPH      3-0      Yes       [id number] 45U       inject 45        

   [facility]



     100 unit/mL      4-09                               Units           ity of



     injection      00:00:                               under the           [facility]

as



               00                                 skin every           Medical



                                                  morning           [facility]



                                                  and            



                                                  evening.           

 

     insulin NPH      3-0      Yes       [id number] 45U       inject 45        

   [facility]



     100 unit/mL      4-09                               Units           ity of



     injection      00:00:                               under the           [facility]

as



               00                                 skin every           Medical



                                                  morning           [facility]



                                                  and            



                                                  evening.           

 

     insulin NPH      3-0      Yes       [id number] 45U       inject 45        

   [facility]



     100 unit/mL      4-09                               Units           ity of



     injection      00:00:                               under the           [facility]

as



               00                                 skin every           Medical



                                                  morning           [facility]



                                                  and            



                                                  evening.           

 

     insulin NPH      2023-0      Yes       [id number] 45U       inject 45        

   [facility]



     100 unit/mL      4-09                               Units           ity of



     injection      00:00:                               under the           [facility]

as



               00                                 skin every           Medical



                                                  morning           [facility]



                                                  and            



                                                  evening.           

 

     insulin NPH      -0      Yes       [id number] 45U       inject 45        

   [facility]



     100 unit/mL      4-09                               Units           ity of



     injection      00:00:                               under the           [facility]

as



               00                                 skin every           Medical



                                                  morning           [facility]



                                                  and            



                                                  evening.           

 

     insulin NPH      -0      Yes       [id number] 45U       inject 45        

   [facility]



     100 unit/mL      4-09                               Units           ity of



     injection      00:00:                               under the           [facility]

as



               00                                 skin every           Medical



                                                  morning           [facility]



                                                  and            



                                                  evening.           

 

     insulin NPH      -0      Yes       [id number] 45U       inject 45        

   [facility]



     100 unit/mL      4-09                               Units           ity of



     injection      00:00:                               under the           [facility]

as



               00                                 skin every           Medical



                                                  morning           [facility]



                                                  and            



                                                  evening.           

 

     insulin NPH      2023- No        [id number] 45U       inject 45       

    [facility]



     100 unit/mL      4-09 04-20                          Units           ity of



     injection      00:00: 00:00                          under the           [facility]

[place]



               00   :00                           skin every           [facility]



                                                  morning           [facility]



                                                  and            



                                                  evening.           

 

     cefpodoxime      2023- Yes       [id number] 200mg      Take 1        

   [facility]



     200 mg      -17                          tablet by           ity of



     tablet      00:00: 04:59                          mouth in           [place]



               00   :00                           the            Medical



                                                  morning           [facility]



                                                  and 1           



                                                  tablet in           



                                                  the            



                                                  evening.           



                                                  Do all           



                                                  this for 7           



                                                  days.           

 

     cefpodoxime      2023- Yes       [id number] 200mg      Take 1        

   [facility]



     200 mg      -17                          tablet by           ity of



     tablet      00:00: 04:59                          mouth in           [place]



               00   :00                           the            Medical



                                                  morning           [facility]



                                                  and 1           



                                                  tablet in           



                                                  the            



                                                  evening.           



                                                  Do all           



                                                  this for 7           



                                                  days.           

 

     cefTRIAXone      0 - No             1000mg      1,000 mg,         

  [facility]



     (ROCEPHIN)      3-25 03-25                          IV             ity of



     1,000 mg in      00:00: 00:31                          [place], [place]



     NaCl 0.9%      00   :00                           ONCE, 1           Medical



     (NS) 100 mL                                         dose, On           [facility]

ch



     MINI-BAG                                         Fri            



                                                  3/24/23 at           



                                                  1900,           



                                                  Administer           



                                                  over 30           



                                                  Minutes,           



                                                  100            



                                                  mL<br>Reas           



                                                  on for           



                                                  Anti-Infec           



                                                  tive:           



                                                  Documented           



                                                  Infection<           



                                                  br>Documen           



                                                  huma            



                                                  Infection           



                                                  Site:           



                                                  Urine<br&g           



                                                  t;Duration           



                                                  of             



                                                  Therapy: 7           



                                                  days           

 

     NaCl 0.9%      2023- No             1000mL      at 999           Uni

vers



     (NS) bolus      3-24 03-25                          mL/hr,           ity of



     infusion      23:45: 01:32                          1,000 mL,           [facility]

as



     1,000 mL      00   :00                           IV             Medical



                                                  Piggyback,           [facility]



                                                  ONCE, 1           



                                                  dose, On           



                                                  Fri            



                                                  3/24/23 at           



                                                  1845, STAT           

 

     insulin      2023- No             10U       10 Units,           Univ

ers



     regular      3-24 03-24                          Subcutaneo           ity o

f



     human      23:45: 23:08                          , ONCE,           [place]



     (HUMULIN R)      00   :00                           1 dose, On           Me

dical



     injection                                         Fri            [facility]



     10 Units                                         3/24/23 at           



                                                  1845,           



                                                  Routine<br           



                                                  >Indicatio           



                                                  n for           



                                                  insulin:           



                                                  Hyperglyce           



                                                  jarvis            

 

     iopamidol      2023- No        [id number] 100mL      100 mL,          

 [facility]



     (ISOVUE      3-24 03-24                          Intravenou           ity o

f



     370-500 mL)      23:15: 22:22                          s, ONCE, 1          

 [place]



     injection      00   :00                           dose, On           Medica

l



     100 mL                                         Fri            [facility]



                                                  3/24/23 at           



                                                  1815,           



                                                  Routine           

 

     FENTanyl PF      2023- No             50ug      50 mcg,           Un

[facility]



     (SUBLIMAZE      3-24 03-24                          Slow IV           ity o

f



     (PF))      22:15: 21:42                          Push,           [place]



     injection      00   :00                           ONCE, 1           Medical



     50 mcg                                         dose, On           [facility]



                                                  Fri            



                                                  3/24/23 at           



                                                  1715,           



                                                  Routine           

 

     NaCl 0.9%      2023- No             1000mL      at 999           Uni

vers



     (NS) bolus      3-24 03-25                          mL/hr,           ity of



     infusion      22:00: 01:32                          1,000 mL,           [facility]

as



     1,000 mL      00   :00                           IV             Medical



                                                  Infusion,           [facility]



                                                  ONCE, 1           



                                                  dose, On           



                                                  Fri            



                                                  3/24/23 at           



                                                  1700, ASAP           

 

     proMETHazin      2023- No             25mg      25 mg, IV           

[facility]



     e         3-24 03-24                          Piggyback,           ity of



     (PHENERGAN)      21:15: 21:46                          ONCE, 1           [facility]

[facility]



     25 mg in      00   :00                           dose, On           Medical



     NaCl 0.9%                                         Fri            [facility]



     (NS) 50 mL                                         3/24/23 at           



     IV                                           1615, ASAP           



     piggyback                                                        

 

     cefdinir      2023- Yes       [id number] 300mg      Take 1           

[facility]



     300 mg      3-24 04-08                          capsule by           ity of



     capsule      00:00: 04:59                          mouth           [place]



               00   :00                           every 12           Medical



                                                  (twelve)           [facility]



                                                  hours for           



                                                  14 days.           

 

     cefdinir      2023-0 2023- Yes       [id number] 300mg      Take 1           

[facility]



     300 mg      3-24 04-08                          capsule by           ity of



     capsule      00:00: 04:59                          mouth           [place]



               00   :00                           every 12           Medical



                                                  (twelve)           [facility]



                                                  hours for           



                                                  14 days.           

 

     cefdinir      2023-0 2023- Yes       [id number] 300mg      Take 1           

[facility]



     300 mg      3-24 04-08                          capsule by           ity of



     capsule      00:00: 04:59                          mouth           [place]



               00   :00                           every 12           Medical



                                                  (twelve)           [facility]



                                                  hours for           



                                                  14 days.           

 

     sodium      2023-0      Yes                      Topical,           [facility]



     hypochlorit      2-23                               TID, First           it

y of



     e 0.25%      20:00:                               dose on           [place]



     (DAKIN'S      00                                 Thu            Medical



     SOLUTION)                                         23 at           [facility]

ch



     solution                                         1400,           



                                                  Until           



                                                  Discontinu           



                                                  ed,            



                                                  Routine           

 

     sodium      2023-0      Yes                      Topical,           [facility]



     hypochlorit      2-23                               TID, First           it

y of



     e 0.25%      20:00:                               dose on           [place]



     (DAKIN'S      00                                 Thu            Medical



     SOLUTION)                                         23 at           [facility]

ch



     solution                                         1400,           



                                                  Until           



                                                  Discontinu           



                                                  ed,            



                                                  Routine           

 

     HYDROmorpho      2023-0      Yes            4mg       Take 4 mg           U

nivers



     ne 4 mg      2-23                               by mouth           ity of



     tablet      14:27:                               in the           [place]



               27                                 morning           Medical



                                                  and 4 mg           [facility]



                                                  at noon           



                                                  and 4 mg           



                                                  in the           



                                                  evening.           

 

     insulin NPH      2023-0      Yes            45U       inject 45           U

nivers



     human      2-23                               Units           ity of



     isophane      14:27:                               under the           [place]

[facility]



     (NOVOLIN N      27                                 skin 2           Medical



     SC)                                          (two)           [facility]



                                                  times           



                                                  daily.           

 

     HYDROmorpho      2023-0      Yes            4mg       Take 4 mg           U

nivers



     ne 4 mg      2-23                               by mouth           ity of



     tablet      14:27:                               in the           [place]



               27                                 morning           Medical



                                                  and 4 mg           [facility]



                                                  at noon           



                                                  and 4 mg           



                                                  in the           



                                                  evening.           

 

     insulin NPH      2023-0      Yes            45U       inject 45           U

nivers



     human      2-23                               Units           ity of



     isophane      14:27:                               under the           [place]

[facility]



     (NOVOLIN N      27                                 skin 2           Medical



     SC)                                          (two)           [facility]



                                                  times           



                                                  daily.           

 

     HYDROmorpho      2023-0      Yes            4mg       Take 4 mg           U

nivers



     ne 4 mg      2-23                               by mouth           ity of



     tablet      14:27:                               in the           [place]



               27                                 morning           Medical



                                                  and 4 mg           [facility]



                                                  at noon           



                                                  and 4 mg           



                                                  in the           



                                                  evening.           

 

     insulin NPH      2023-0      Yes            45U       inject 45           U

nivers



     human      2-23                               Units           ity of



     isophane      14:27:                               under the           [place]

[facility]



     (NOVOLIN N      27                                 skin 2           Medical



     SC)                                          (two)           [facility]



                                                  times           



                                                  daily.           

 

     HYDROmorpho      2023-0      Yes            4mg       Take 4 mg           U

nivers



     ne 4 mg      2-23                               by mouth           ity of



     tablet      14:27:                               in the           [place]



               27                                 morning           Medical



                                                  and 4 mg           [facility]



                                                  at noon           



                                                  and 4 mg           



                                                  in the           



                                                  evening.           

 

     insulin NPH      2023-0      Yes            45U       inject 45           U

nivers



     human      2-23                               Units           ity of



     isophane      14:27:                               under the           [place]

[facility]



     (NOVOLIN N      27                                 skin 2           Medical



     SC)                                          (two)           [facility]



                                                  times           



                                                  daily.           

 

     HYDROmorpho      2023-0      Yes            4mg       Take 4 mg           U

nivers



     ne 4 mg      2-23                               by mouth           ity of



     tablet      14:27:                               in the           [place]



               27                                 morning           Medical



                                                  and 4 mg           [facility]



                                                  at noon           



                                                  and 4 mg           



                                                  in the           



                                                  evening.           

 

     insulin NPH      2023-0      Yes            45U       inject 45           U

nivers



     human      2-23                               Units           ity of



     isophane      14:27:                               under the           [place]

[facility]



     (NOVOLIN N      27                                 skin 2           Medical



     SC)                                          (two)           [facility]



                                                  times           



                                                  daily.           

 

     HYDROmorpho      2023-0      Yes            4mg       Take 4 mg           U

nivers



     ne 4 mg      2-23                               by mouth           ity of



     tablet      14:27:                               in the           [place]



               27                                 morning           Medical



                                                  and 4 mg           [facility]



                                                  at noon           



                                                  and 4 mg           



                                                  in the           



                                                  evening.           

 

     HYDROmorpho      2023-0      Yes            4mg       Take 4 mg           U

nivers



     ne 4 mg      2-23                               by mouth           ity of



     tablet      14:27:                               in the           [place]



               27                                 morning           Medical



                                                  and 4 mg           [facility]



                                                  at noon           



                                                  and 4 mg           



                                                  in the           



                                                  evening.           

 

     HYDROmorpho      2023-0      Yes            4mg       Take 4 mg           U

nivers



     ne 4 mg      2-23                               by mouth           ity of



     tablet      14:27:                               in the           [place]



               27                                 morning           Medical



                                                  and 4 mg           [facility]



                                                  at noon           



                                                  and 4 mg           



                                                  in the           



                                                  evening.           

 

     HYDROmorpho      2023-0      Yes            4mg       Take 4 mg           U

nivers



     ne 4 mg      2-23                               by mouth           ity of



     tablet      14:27:                               in the           [place]



               27                                 morning           Medical



                                                  and 4 mg           [facility]



                                                  at noon           



                                                  and 4 mg           



                                                  in the           



                                                  evening.           

 

     HYDROmorpho      2023-0      Yes            4mg       Take 4 mg           U

nivers



     ne 4 mg      2-23                               by mouth           ity of



     tablet      14:27:                               in the           [place]



               27                                 morning           Medical



                                                  and 4 mg           [facility]



                                                  at noon           



                                                  and 4 mg           



                                                  in the           



                                                  evening.           

 

     HYDROmorpho      2023-0      Yes            4mg       Take 4 mg           U

nivers



     ne 4 mg      2-23                               by mouth           ity of



     tablet      14:27:                               in the           [place]



               27                                 morning           Medical



                                                  and 4 mg           [facility]



                                                  at noon           



                                                  and 4 mg           



                                                  in the           



                                                  evening.           

 

     HYDROmorpho      2023-0      Yes            4mg       Take 4 mg           U

nivers



     ne 4 mg      2-23                               by mouth           ity of



     tablet      14:27:                               in the           [place]



               27                                 morning           Medical



                                                  and 4 mg           [facility]



                                                  at noon           



                                                  and 4 mg           



                                                  in the           



                                                  evening.           

 

     HYDROmorpho      2023-0      Yes            4mg       Take 4 mg           U

nivers



     ne 4 mg      2-23                               by mouth           ity of



     tablet      14:27:                               in the           [place]



               27                                 morning           Medical



                                                  and 4 mg           [facility]



                                                  at noon           



                                                  and 4 mg           



                                                  in the           



                                                  evening.           

 

     HYDROmorpho      2023-0      Yes            4mg       Take 4 mg           U

nivers



     ne 4 mg      2-23                               by mouth           ity of



     tablet      14:27:                               in the           [place]



               27                                 morning           Medical



                                                  and 4 mg           [facility]



                                                  at noon           



                                                  and 4 mg           



                                                  in the           



                                                  evening.           

 

     HYDROmorpho      2023-0      Yes            4mg       Take 4 mg           U

nivers



     ne 4 mg      2-23                               by mouth           ity of



     tablet      14:27:                               in the           [place]



               27                                 morning           Medical



                                                  and 4 mg           [facility]



                                                  at noon           



                                                  and 4 mg           



                                                  in the           



                                                  evening.           

 

     HYDROmorpho      2023-0      Yes            4mg       Take 4 mg           U

nivers



     ne 4 mg      2-23                               by mouth           ity of



     tablet      14:27:                               in the           [place]



               27                                 morning           Medical



                                                  and 4 mg           [facility]



                                                  at noon           



                                                  and 4 mg           



                                                  in the           



                                                  evening.           

 

     HYDROmorpho      2023-0      Yes            4mg       Take 4 mg           U

nivers



     ne 4 mg      2-23                               by mouth           ity of



     tablet      14:27:                               in the           [place]



               27                                 morning           Medical



                                                  and 4 mg           [facility]



                                                  at noon           



                                                  and 4 mg           



                                                  in the           



                                                  evening.           

 

     HYDROmorpho      2023-0      Yes            4mg       Take 4 mg           U

nivers



     ne 4 mg      2-23                               by mouth           ity of



     tablet      14:27:                               in the           [place]



               27                                 morning           Medical



                                                  and 4 mg           [facility]



                                                  at noon           



                                                  and 4 mg           



                                                  in the           



                                                  evening.           

 

     HYDROmorpho      2023-0      Yes            4mg       Take 4 mg           U

nivers



     ne 4 mg      2-23                               by mouth           ity of



     tablet      14:27:                               in the           [place]



               27                                 morning           Medical



                                                  and 4 mg           [facility]



                                                  at noon           



                                                  and 4 mg           



                                                  in the           



                                                  evening.           

 

     HYDROmorpho      2023-0      Yes            4mg       Take 4 mg           U

nivers



     ne 4 mg      2-23                               by mouth           ity of



     tablet      14:27:                               in the           [place]



               27                                 morning           Medical



                                                  and 4 mg           [facility]



                                                  at noon           



                                                  and 4 mg           



                                                  in the           



                                                  evening.           

 

     HYDROmorpho      2023-0      Yes            4mg       Take 4 mg           U

nivers



     ne 4 mg      2-23                               by mouth           ity of



     tablet      14:27:                               in the           [place]



               27                                 morning           Medical



                                                  and 4 mg           [facility]



                                                  at noon           



                                                  and 4 mg           



                                                  in the           



                                                  evening.           

 

     HYDROmorpho      2023-0      Yes            4mg       Take 4 mg           U

nivers



     ne 4 mg      2-23                               by mouth           ity of



     tablet      14:27:                               in the           [place]



               27                                 morning           Medical



                                                  and 4 mg           [facility]



                                                  at noon           



                                                  and 4 mg           



                                                  in the           



                                                  evening.           

 

     HYDROmorpho      2023-0      Yes            4mg       Take 4 mg           U

nivers



     ne 4 mg      2-23                               by mouth           ity of



     tablet      14:27:                               in the           [place]



               27                                 morning           Medical



                                                  and 4 mg           [facility]



                                                  at noon           



                                                  and 4 mg           



                                                  in the           



                                                  evening.           

 

     HYDROmorpho      2023-0      Yes            4mg       Take 4 mg           U

nivers



     ne 4 mg      2-23                               by mouth           ity of



     tablet      14:27:                               in the           [place]



               27                                 morning           Medical



                                                  and 4 mg           [facility]



                                                  at noon           



                                                  and 4 mg           



                                                  in the           



                                                  evening.           

 

     HYDROmorpho      2023-0      Yes            4mg       Take 4 mg           U

nivers



     ne 4 mg      2-23                               by mouth           ity of



     tablet      14:27:                               in the           [place]



               27                                 morning           Medical



                                                  and 4 mg           [facility]



                                                  at noon           



                                                  and 4 mg           



                                                  in the           



                                                  evening.           

 

     HYDROmorpho      2023-0      Yes            4mg       Take 4 mg           U

nivers



     ne 4 mg      2-23                               by mouth           ity of



     tablet      14:27:                               in the           [place]



               27                                 morning           Medical



                                                  and 4 mg           [facility]



                                                  at noon           



                                                  and 4 mg           



                                                  in the           



                                                  evening.           

 

     HYDROmorpho      2023-0      Yes            4mg       Take 4 mg           U

nivers



     ne 4 mg      2-23                               by mouth           ity of



     tablet      14:27:                               in the           [place]



               27                                 morning           Medical



                                                  and 4 mg           [facility]



                                                  at noon           



                                                  and 4 mg           



                                                  in the           



                                                  evening.           

 

     HYDROmorpho      2023-0      Yes            4mg       Take 4 mg           U

nivers



     ne 4 mg      2-23                               by mouth           ity of



     tablet      14:27:                               in the           [place]



               27                                 morning           Medical



                                                  and 4 mg           [facility]



                                                  at noon           



                                                  and 4 mg           



                                                  in the           



                                                  evening.           

 

     HYDROmorpho      2023-0      Yes            4mg       Take 4 mg           U

nivers



     ne 4 mg      2-23                               by mouth           ity of



     tablet      14:27:                               in the           [place]



               27                                 morning           Medical



                                                  and 4 mg           [facility]



                                                  at noon           



                                                  and 4 mg           



                                                  in the           



                                                  evening.           

 

     HYDROmorpho      2023-0 2023- No             1mg       1 mg, Slow          

 [facility]



     ne                                  IV Push,           ity of



     (DILAUDID)      11:09: 11:08                          Q4HPRN,           Eric

as



     injection 1      37   :37                           Starting           Medi

[facility]



     mg                                           on Thu           [facility]



                                                  23 at           



                                                  0509,           



                                                  Until Sat           



                                                  23 at           



                                                  0508,           



                                                  Routine,           



                                                  Pain           



                                                  (scale           



                                                  7-10)<br>U           



                                                  se             



                                                  approved           



                                                  by             



                                                  (Faculty):           



                                                  [facility]            



                                                  PROVIDER           

 

     HYDROmorpho      2023-0 2023- No             1mg       1 mg, Slow          

 [facility]



     ne                                  IV Push,           ity of



     (DILAUDID)      11:09: 11:08                          Q4HPRN,           [facility]

as



     injection 1      37   :37                           Starting           Medi

[facility]



     mg                                           on Thu           [facility]



                                                  23 at           



                                                  0509,           



                                                  Until Sat           



                                                  23 at           



                                                  0508,           



                                                  Routine,           



                                                  Pain           



                                                  (scale           



                                                  7-10)<br>U           



                                                  se             



                                                  approved           



                                                  by             



                                                  (Faculty):           



                                                  [facility]            



                                                  PROVIDER           

 

     HYDROmorpho      2023-0      Yes            4mg       Take 4 mg           U

nivers



     ne 4 mg      2-23                               by mouth           ity of



     tablet      10:57:                               in the           [place]



               10                                 morning           Medical



                                                  and 4 mg           [facility]



                                                  at noon           



                                                  and 4 mg           



                                                  in the           



                                                  evening.           

 

     insulin NPH      2023-0      Yes            45U       inject 45           U

nivers



     human      2-23                               Units           ity of



     isophane      10:57:                               under the           [place]

s



     (NOVOLIN N      10                                 skin 2           Medical



     SC)                                          (two)           [facility]



                                                  times           



                                                  daily.           

 

     HYDROmorpho      2023-0      Yes            4mg       Take 4 mg           U

nivers



     ne 4 mg      2-23                               by mouth           ity of



     tablet      10:57:                               in the           [place]



               10                                 morning           Medical



                                                  and 4 mg           [facility]



                                                  at noon           



                                                  and 4 mg           



                                                  in the           



                                                  evening.           

 

     insulin NPH      2023-0      Yes            45U       inject 45           U

nivers



     human      2-23                               Units           ity of



     isophane      10:57:                               under the           [place]

s



     (NOVOLIN N      10                                 skin 2           Medical



     SC)                                          (two)           [facility]



                                                  times           



                                                  daily.           

 

     sodium      2023-0 2023- No             16[oz_a      16 oz,           Un[facility]

rs



     hypochlorit                      v]        Topical,           ity

 of



     e 0.5%      04:00: 18:17                          Q8H, First           [place]

s



     (DAKINS)      00   :09                           dose on           Medical



     solution 16                                         Wed            [facility]



     oz                                           23 at           



                                                  2200,           



                                                  Until           



                                                  Discontinu           



                                                  ed,            



                                                  Routine           

 

     amitriptyli      0      Yes            25mg      25 mg,           Univ

ers



     ne (ELAVIL)                                     Oral, QHS,           it

y of



     tablet 25      03:45:                               First dose           [facility]

[facility]



     mg        00                                 on Wed           Medical



                                                  23 at           [facility]



                                                  2145,           



                                                  Until           



                                                  Discontinu           



                                                  ed,            



                                                  Routine           

 

     amitriptyli      0      Yes            25mg      25 mg,           Univ

ers



     ne (ELAVIL)                                     Oral, QHS,           it

y of



     tablet 25      03:45:                               First dose           [facility]

[place]



     mg        00                                 on Wed           Medical



                                                  23 at           [facility]



                                                  2145,           



                                                  Until           



                                                  Discontinu           



                                                  ed,            



                                                  Routine           

 

     HYDROmorpho      2023- No             1mg       1 mg, Slow          

 [facility]



     ne                                  IV Push,           ity of



     (DILAUDID)      03:40: 09:56                          Q4HPRN,           [facility]

as



     injection 1      47   :20                           Starting           Medi

[facility]



     mg                                           on Wed           [facility]



                                                  23 at           



                                                  2140,           



                                                  Until Thu           



                                                  23 at           



                                                  0356,           



                                                  Routine,           



                                                  Pain           



                                                  (scale           



                                                  7-10)<br>U           



                                                  se             



                                                  approved           



                                                  by             



                                                  (Faculty):           



                                                  [facility]            



                                                  PROVIDER           

 

     FENTanyl PF      2023- No             25ug      25 mcg,           Un

yoselyn



     (SUBLIMAZE                                Slow IV           ity o

f



     (PF))      20:55: 21:26                          Push,           [place]



     injection      32   :00                           Q5MIN PRN,           Medi

sarah



     25 mcg                                         4 doses,           [facility]



                                                  Starting           



                                                  on Wed           



                                                  23 at           



                                                  1455,           



                                                  Until Wed           



                                                  23 at           



                                                  1526,           



                                                  Routine,           



                                                  Pain           



                                                  (scale           



                                                  7-10),           



                                                  PACU           

 

     bupivacaine      2023- No                       PRN,           Unive

rs



     (preserv                                Starting           ity of



     free)      20:36: 21:03                          on Wed           [place]



     (SENSORCAIN      00   :01                           23 at           Me

dical



     E MPF) 0.25                                         1436,           [facility]anch



     % (2.5                                         Until Wed           



     mg/mL)                                         23 at           



     injection                                         1503,           



                                                  Routine,           



                                                  Intra-op           

 

     insulin      3-0      Yes            4U        4 Units,           Univer

s



     lispro                                     Subcutaneo           ity of



     (human)      17:30:                               us, Mariana ORTIZ

s



     (HumaLOG      00                                 First dose           Medic

al



     U-100)                                         on Wed           [facility]



     injection 4                                         23 at           



     Units                                         1130,           



                                                  Until           



                                                  Discontinu           



                                                  ed,            



                                                  Routine           

 

     insulin      -0      Yes            4U        4 Units,           Univer

s



     lispro                                     Subcutaneo           ity of



     (human)      17:30:                               us, TIDACErica

s



     (HumaLOG      00                                 First dose           Medic

al



     U-100)                                         on Wed           [facility]



     injection 4                                         23 at           



     Units                                         1130,           



                                                  Until           



                                                  Discontinu           



                                                  ed,            



                                                  Routine           

 

     proMETHazin      0      Yes            12.5mg      12.5 mg,           

[facility]



     e                                        IV             ity of



     (PHENERGAN)      15:11:                               Piggyback,           

[place]



     12.5 mg in      52                                 at 200           Medical



     NaCl 0.9%                                         mL/hr           [facility]



     (NS) 50 mL                                         Administer           



     IV                                           over 15           



     piggyback                                         Minutes,           



                                                  Q4HPRN,           



                                                  Starting           



                                                  on 23 at           



                                                  0911,           



                                                  Until           



                                                  Discontinu           



                                                  ed,            



                                                  Routine,           



                                                  N/V            



                                                  unresponsi           



                                                  ve to           



                                                  Ondansetro           



                                                  n              

 

     proMETHazin      -0      Yes            12.5mg      12.5 mg,           

[facility]



     e                                        IV             ity of



     (PHENERGAN)      15:11:                               [place]gyback,           

[place]



     12.5 mg in      52                                 at 200           Medical



     NaCl 0.9%                                         mL/hr           [facility]



     (NS) 50 mL                                         Administer           



     IV                                           over 15           



     piggyback                                         Minutes,           



                                                  Q4HPRN,           



                                                  Starting           



                                                  on 23 at           



                                                  0911,           



                                                  Until           



                                                  Discontinu           



                                                  ed,            



                                                  Routine,           



                                                  N/V            



                                                  unresponsi           



                                                  ve to           



                                                  Ondansetro           



                                                  n              

 

     cefTRIAXone      -0 - No             1000mg      1,000 mg,         

  [facility]



     (ROCEPHIN)       0301                          IV             ity of



     1,000 mg in      04:00: 03:59                          Piggyback,          

 [place]



     NaCl 0.9%      00   :00                           Q24H, 7           Medical



     (NS) 50 mL                                         doses,           [facility]



     MINI-BAG                                         First dose           



                                                  on 23 at           



                                                  2200, Last           



                                                  dose on           



                                                  23 at           



                                                  2200,           



                                                  Administer           



                                                  over 30           



                                                  Minutes,           



                                                  50             



                                                  mL<br>Reas           



                                                  on for           



                                                  Anti-Infec           



                                                  tive:           



                                                  Documented           



                                                  Infection<           



                                                  br>Documen           



                                                  huma            



                                                  Infection           



                                                  Site:           



                                                  Urine<br>D           



                                                  uration of           



                                                  Therapy: 7           



                                                  days           

 

     cefTRIAXone      2023- No             1000mg      1,000 mg,         

  [facility]



     (ROCEPHIN)      01                          IV             ity of



     1,000 mg in      04:00: 03:59                          Piggyback,          

 [place]



     NaCl 0.9%      00   :00                           Q24H, 7           Medical



     (NS) 50 mL                                         doses,           [facility]



     MINI-BAG                                         First dose           



                                                  on 23 at           



                                                  2200, Last           



                                                  dose on           



                                                  23 at           



                                                  2200,           



                                                  Administer           



                                                  over 30           



                                                  Minutes,           



                                                  50             



                                                  mL<br>Reas           



                                                  on for           



                                                  Anti-Infec           



                                                  tive:           



                                                  Documented           



                                                  Infection<           



                                                  br>Documen           



                                                  huma            



                                                  Infection           



                                                  Site:           



                                                  Urine<br>D           



                                                  uration of           



                                                  Therapy: 7           



                                                  days           

 

     insulin NPH      0      Yes            30U       30 Units,           U

nivers



     (HUMULIN N)                                     Subcutaneo           it

y of



     injection      03:00:                               ,            [place]



     30 Units      00                                 QAM+HS,           Medical



                                                  First dose           [facility]



                                                  (after           



                                                  last           



                                                  modificati           



                                                  on) on 23 at           



                                                  2100,           



                                                  Until           



                                                  Discontinu           



                                                  ed,            



                                                  Routine           

 

     insulin NPH            Yes            30U       30 Units,           U

nivers



     (HUMULIN N)                                     Subcutaneo           it

y of



     injection      03:00:                               [place], [place]



     30 Units      00                                 QAM+HS,           Medical



                                                  First dose           [facility]



                                                  (after           



                                                  last           



                                                  modificati           



                                                  on) on 23 at           



                                                  2100,           



                                                  Until           



                                                  Discontinu           



                                                  ed,            



                                                  Routine           

 

     enoxaparin            Yes            40mg      40 mg,           Unive

rs



     (LOVENOX)                                     Subcutaneo           ity 

of



     injection      23:00:                               [place], DAILY,           [place]

[place]



     40 mg      00                                 First dose           Medical



                                                  on Tue           [facility]



                                                  23 at           



                                                  1700,           



                                                  Until           



                                                  Discontinu           



                                                  ed,            



                                                  Routine           

 

     enoxaparin            Yes            40mg      40 mg,           Unive

rs



     (LOVENOX)      -                               Subcutaneo           ity 

of



     injection      23:00:                               [place], DAILY,           [place]

[place]



     40 mg      00                                 First dose           Medical



                                                  on Tue           [facility]



                                                  23 at           



                                                  1700,           



                                                  Until           



                                                  Discontinu           



                                                  ed,            



                                                  Routine           

 

     proCHLORper            Yes            10mg      10 mg, IV           U

nivers



     azine                                     Piggyback,           ity of



     (COMPAZINE)      20:07:                               at 100           [place]

[place]



     10 mg in      44                                 mL/hr           Medical



     NaCl 0.9%                                         Administer           [facility]ran

ch



     (NS)                                         over 30           



     piggyback                                         Minutes,           



                                                  Q6HPRN,           



                                                  Starting           



                                                  on 23 at           



                                                  1407,           



                                                  Until           



                                                  Discontinu           



                                                  ed,            



                                                  Routine,           



                                                  Nausea and           



                                                  Vomiting           



                                                  (N/V)           

 

     proCHLORper      0      Yes            10mg      10 mg, IV           U

nivers



     azine                                     Piggyback,           ity of



     (COMPAZINE)      20:07:                               at 100           [place]

[place]



     10 mg in      44                                 mL/hr           Medical



     NaCl 0.9%                                         Administer           Bran

ch



     (NS)                                         over 30           



     piggyback                                         Minutes,           



                                                  Q6HPRN,           



                                                  Starting           



                                                  on 23 at           



                                                  1407,           



                                                  Until           



                                                  Discontinu           



                                                  ed,            



                                                  Routine,           



                                                  Nausea and           



                                                  Vomiting           



                                                  (N/V)           

 

     insulin      0 - No             10U       10 Units,           Univ

ers



     regular                                Subcutaneo           ity o

f



     human      19:00: 19:28                          , ONCE,           [place]



     (HUMULIN R)      00   :00                           1 dose, On           Me

dical



     injection                                         Tue            [facility]



     10 Units                                         23 at           



                                                  1300,           



                                                  Routine<br           



                                                  >Indicatio           



                                                  n for           



                                                  insulin:           



                                                  Hyperglyce           



                                                  jarvis            

 

     insulin      0 - No             10U       10 Units,           Univ

ers



     regular                                IV Push,           ity of



     human      16:00: 17:00                          ONCE, 1           [place]



     (HUMULIN R)      00   :00                           dose, On           Medi

sarah



     injection                                         Tue            [facility]



     10 Units                                         23 at           



                                                  1000,           



                                                  Routine<br           



                                                  >Indicatio           



                                                  n for           



                                                  insulin:           



                                                  Hyperglyce           



                                                  jarvis            

 

     pantoprazol            Yes            40mg      40 mg,           Univ

ers



     e                                        Slow IV           ity of



     (PROTONIX)      15:00:                               Push,           [place]



     injection      00                                 DAILY,           Medical



     40 mg                                         First dose           [facility]



                                                  on 23 at           



                                                  0900,           



                                                  Until           



                                                  Discontinu           



                                                  ed             

 

     sennosides-      0      Yes            1{tbl}      1 tablet,          

 [facility]



     docusate                                     Oral,           ity of



     sodium      15:00:                               DAILY,           [place]



     (SENOKOT-S)      00                                 First dose           Me

dical



     8.6-50 mg                                         on Tu           [facility]



     per tablet                                         23 at           



     1 tablet                                         0900,           



                                                  Until           



                                                  Discontinu           



                                                  ed,            



                                                  Routine           

 

     pantoprazol            Yes            40mg      40 mg,           Univ

ers



     e                                        Slow IV           ity of



     (PROTONIX)      15:00:                               Push,           [place]



     injection      00                                 DAILY,           Medical



     40 mg                                         First dose           [facility]



                                                  on 23 at           



                                                  0900,           



                                                  Until           



                                                  Discontinu           



                                                  ed             

 

     sennosides-            Yes            1{tbl}      1 tablet,          

 [facility]



     docusate                                     Oral,           ity of



     sodium      15:00:                               DAILY,           [place]



     (SENOKOT-S)      00                                 First dose           Me

dical



     8.6-50 mg                                         on Tue           [facility]



     per tablet                                         23 at           



     1 tablet                                         0900,           



                                                  Until           



                                                  Discontinu           



                                                  ed,            



                                                  Routine           

 

     insulin NPH      2023- No             20U       20 Units,           

[facility]



     (HUMULIN N)                                Subcutaneo           i

ty of



     injection      15:00: 18:00                          us,            [place]



     20 Units      00   :37                           QAM+HS,           Medical



                                                  First dose           Branch



                                                  on 23 at           



                                                  0900,           



                                                  Until           



                                                  Discontinu           



                                                  ed,            



                                                  Routine           

 

     Sliding            Yes                      Subcutaneo           Univ

ers



     Scale      2-21                               us, TID           ity of



     Insulin -      14:00:                               MEALS+HS,           Eric

as



     Lispro      00                                 First dose           Medical



     (HumaLOG) +                                         on Tue           Branch



     Fsbg                                         23 at           



     Testing                                         0800,           



                                                  Until           



                                                  Discontinu           



                                                  ed,            



                                                  Routine           

 

     Sliding            Yes                      Subcutaneo           [facility]

ers



     Scale      2-21                               us, TID           ity of



     Insulin -      14:00:                               MEALS+HS,           Eric

as



     Lispro      00                                 First dose           Medical



     (HumaLOG) +                                         on Tue           Branch



     Fsbg                                         23 at           



     Testing                                         0800,           



                                                  Until           



                                                  Discontinu           



                                                  ed,            



                                                  Routine           

 

     HYDROmorpho      2023- No             .5mg      0.5 mg,           Un

[place]



     ne                                  Slow IV           ity of



     (DILAUDID)      09:57: 03:41                          Push,           [place]



     injection      20   :06                           Q4HPRN,           Medical



     0.5 mg                                         Starting           Branch



                                                  on 23 at           



                                                  0357,           



                                                  Until 23 at           



                                                  2141,           



                                                  Routine,           



                                                  Pain           



                                                  (scale           



                                                  7-10)<br>U           



                                                  se             



                                                  approved           



                                                  by             



                                                  (Faculty):           



                                                  [facility]            



                                                  PROVIDER           

 

     HYDROmorpho            Yes            4mg       4 mg,           Unive

rs



     ne                                       Oral,           ity of



     (DILAUDID)      09:57:                               Q4HPRN,           [place]

[place]



     tablet 4 mg      04                                 Starting           Medi

sarah



                                                  on /21/23 at           



                                                  0357,           



                                                  Until           



                                                  Discontinu           



                                                  ed,            



                                                  Routine,           



                                                  Pain           



                                                  (scale           



                                                  4-6)           

 

     HYDROmorpho      -0      Yes            4mg       4 mg,           Unive

rs



     ne                                       Oral,           ity of



     (DILAUDID)      09:57:                               Q4HPRN,           [place]

[place]



     tablet 4 mg      04                                 Starting           Medi

sarah



                                                  on Tue           Branch



                                                  23 at           



                                                  0357,           



                                                  Until           



                                                  Discontinu           



                                                  ed,            



                                                  Routine,           



                                                  Pain           



                                                  (scale           



                                                  4-6)           

 

     FENTanyl PF      -0 - No             50ug      50 mcg,           Un

y[place]



     (SUBLIMAZE                                Slow IV           ity o

f



     (PF))      09:17: 09:58                          Push,           [place]



     injection      04   :01                           Q4HPRN,           Medical



     50 mcg                                         Starting           Branch



                                                  on 23 at           



                                                  0317,           



                                                  Until 23 at           



                                                  0358,           



                                                  Routine,           



                                                  Pain           



                                                  (scale           



                                                  7-10)           

 

     NaCl 0.9%      -0      Yes            1000mL      at 125           Univ

ers



     (NS) IV      2-21                               mL/hr, IV           ity of



     infusion      09:00:                               Infusion,           [place]

[place]



     1,000 mL      00                                 CONTINUOUS           Medic

al



                                                  , Starting           Branch



                                                  on 23 at           



                                                  0300,           



                                                  Until           



                                                  Discontinu           



                                                  ed,            



                                                  Routine           

 

     NaCl 0.9%      -0      Yes            1000mL      at 125           Univ

ers



     (NS) IV      2-21                               mL/hr, IV           ity of



     infusion      09:00:                               Infusion,           [place]

[place]



     1,000 mL      00                                 CONTINUOUS           Medic

al



                                                  , Starting           Branch



                                                  on 23 at           



                                                  0300,           



                                                  Until           



                                                  Discontinu           



                                                  ed,            



                                                  Routine           

 

     glucagon      -0      Yes            1mg       1 mg,           [facility]



     (GLUCAGEN                                     Intramuscu           ity 

of



     DIAGNOSTIC      08:53:                               lar, PRN,           [place]

[place]



     KIT)      52                                 Starting           Medical



     injection 1                                         on            Branch



     mg                                           23 at           



                                                  0253,           



                                                  Until           



                                                  Discontinu           



                                                  ed, ASAP,           



                                                  Blood           



                                                  Glucose           



                                                  &amp;lt;           



                                                  or = 70           



                                                  mg/dL and           



                                                  patient is           



                                                  NPO,           



                                                  unable to           



                                                  swallow or           



                                                  has mental           



                                                  changes.           

 

     dextrose 50      -0      Yes            25mL      25 mL,           Univ

ers



     % in water                                     Slow IV           ity of



     (D50W)      08:53:                               Push, PRN,           [place]



     injection      52                                 Starting           Medica

l



     25 mL                                         on            Branch



                                                  23 at           



                                                  0253,           



                                                  Until           



                                                  Discontinu           



                                                  ed, ASAP,           



                                                  Blood           



                                                  Glucose           



                                                  &amp;lt;           



                                                  or = 70           



                                                  mg/dL and           



                                                  patient is           



                                                  NPO,           



                                                  unable to           



                                                  swallow or           



                                                  has mental           



                                                  status           



                                                  changes.           

 

     glucagon      2023-0      Yes            1mg       1 mg,           [facility]



     (GLUCAGEN      2-                               Intramuscu           ity 

of



     DIAGNOSTIC      08:53:                               lar, PRN,           [place]

[place]



     KIT)      52                                 Starting           Medical



     injection 1                                         on Tue           Branch



     mg                                           23 at           



                                                  0253,           



                                                  Until           



                                                  Discontinu           



                                                  ed, ASAP,           



                                                  Blood           



                                                  Glucose           



                                                  &amp;lt;           



                                                  or = 70           



                                                  mg/dL and           



                                                  patient is           



                                                  NPO,           



                                                  unable to           



                                                  swallow or           



                                                  has mental           



                                                  changes.           

 

     dextrose 50      2023-0      Yes            25mL      25 mL,           Univ

ers



     % in water      2                               Slow IV           ity of



     (D50W)      08:53:                               Push, PRN,           [place]



     injection      52                                 Starting           Medica

l



     25 mL                                         on Tue           Branch



                                                  23 at           



                                                  0253,           



                                                  Until           



                                                  Discontinu           



                                                  ed, ASAP,           



                                                  Blood           



                                                  Glucose           



                                                  &amp;lt;           



                                                  or = 70           



                                                  mg/dL and           



                                                  patient is           



                                                  NPO,           



                                                  unable to           



                                                  swallow or           



                                                  has mental           



                                                  status           



                                                  changes.           

 

     ondansetron      3-0      Yes            4mg       4 mg, Slow           

[facility]



     (ZOFRAN      2-21                               IV Push,           ity of



     (PF))      08:53:                               Q6HPRN,           [place]



     injection 4      45                                 Starting           Medi

sarah



     mg                                           on            Branch



                                                  23 at           



                                                  0253,           



                                                  Until           



                                                  Discontinu           



                                                  ed,            



                                                  Routine,           



                                                  Nausea and           



                                                  Vomiting           



                                                  (N/V)           

 

     ondansetron      2023-0      Yes            4mg       4 mg, Slow           

[facility]



     (ZOFRAN      2-21                               IV Push,           ity of



     (PF))      08:53:                               Q6HPRN,           [place]



     injection 4      45                                 Starting           Medi

sarah



     mg                                           on            Branch



                                                  23 at           



                                                  0253,           



                                                  Until           



                                                  Discontinu           



                                                  ed,            



                                                  Routine,           



                                                  Nausea and           



                                                  Vomiting           



                                                  (N/V)           

 

     acetaminoph      2023-0      Yes            650mg      650 mg,           Un

[place]



     en        2-21                               Oral,           ity of



     (TYLENOL)      08:53:                               Q6HPRN,           [place]



     tablet 650      15                                 Starting           Medic

al



     mg                                           on            Branch



                                                  23 at           



                                                  0253,           



                                                  Until           



                                                  Discontinu           



                                                  ed,            



                                                  Routine,           



                                                  Pain           



                                                  (scale           



                                                  1-3)           

 

     acetaminoph      2023-0      Yes            650mg      650 mg,           Un

[place]



     en        2-21                               Oral,           ity of



     (TYLENOL)      08:53:                               Q6HPRN,           [place]



     tablet 650      15                                 Starting           Medic

al



     mg                                           on            Branch



                                                  23 at           



                                                  0253,           



                                                  Until           



                                                  Discontinu           



                                                  ed,            



                                                  Routine,           



                                                  Pain           



                                                  (scale           



                                                  1-3)           

 

     insulin      0 - No             10U       10 Units,           Univ

ers



     regular                                IV Push,           ity of



     human      06:30: 05:53                          ONCE, 1           [place]



     (HUMULIN R)      00   :00                           dose, On           Medi

sarah



     injection                                         Tue            Branch



     10 Units                                         23 at           



                                                  0030,           



                                                  Routine<br           



                                                  >Indicatio           



                                                  n for           



                                                  insulin:           



                                                  Hyperglyce           



                                                  jarvis            

 

     NaCl 0.9%      2023- No             1000mL      at 999           Uni

vers



     (NS) bolus                                mL/hr,           ity of



     infusion      06:00: 06:53                          1,000 mL,           Eric

as



     1,000 mL      00   :00                           IV             Medical



                                                  Infusion,           Branch



                                                  ONCE, 1           



                                                  dose, On           



                                                  23 at           



                                                  0000, ASA           

 

     insulin      2023- No             10U       10 Units,           Univ

ers



     regular                                Slow IV           ity of



     human      05:00: 04:15                          Push,           [place]



     (HUMULIN R)      00   :00                           ONCE, 1           Medic

al



     injection                                         dose, On           Branch



     10 Units                                         Mon            



                                                  23 at           



                                                  2300,           



                                                  Routine<br           



                                                  >Indicatio           



                                                  n for           



                                                  insulin:           



                                                  Hyperglyce           



                                                  jarvis            

 

     NaCl 0.9%      2023- No             1000mL      at 999           Uni

vers



     (NS) bolus                                mL/hr,           ity of



     infusion      04:45: 05:33                          1,000 mL,           Eric

as



     1,000 mL      00   :00                           IV             Medical



                                                  Infusion,           Branch



                                                  ONCE, 1           



                                                  dose, On           



                                                  23 at           



                                                  2245, ASAP           

 

     ibuprofen      2023- No             600mg      600 mg,           Uni

vers



     (IBU)                                Oral,           ity of



     tablet 600      04:09: 04:21                          ONCE, 1           Eric

as



     mg        00   :00                           dose, On           Medical



                                                  Mon            Branch



                                                  23 at           



                                                  2215, ASAP           

 

     cefTRIAXone      2023- No             1000mg      1,000 mg,         

  [facility]



     (ROCEPHIN)                                IV             ity of



     1,000 mg in      03:45: 04:34                          Piggyback,          

 [place]



     NaCl 0.9%      00   :00                           ONCE, 1           Medical



     (NS) 100 mL                                         dose, On           Bran

ch



     MINI-BAG                                         23 at           



                                                  2145,           



                                                  Administer           



                                                  over 30           



                                                  Minutes,           



                                                  100            



                                                  mL<br>Reas           



                                                  on for           



                                                  Anti-Infec           



                                                  tive:           



                                                  Documented           



                                                  Infection<           



                                                  br>Documen           



                                                  huma            



                                                  Infection           



                                                  Site:           



                                                  Urine<br&g           



                                                  t;Duration           



                                                  of             



                                                  Therapy:           



                                                  Other (see           



                                                  Comments)           

 

     HYDROmorpho      0      Yes            4mg       Take 4 mg           U

nivers



     ne 4 mg      2-21                               by mouth           ity of



     tablet      01:46:                               in the           [place]



               16                                 morning           Medical



                                                  and 4 mg           [facility]



                                                  at noon           



                                                  and 4 mg           



                                                  in the           



                                                  evening.           

 

     insulin NPH            Yes            45U       inject 45           U

nivers



     human      2-21                               Units           ity of



     isophane      01:46:                               under the           [place]

s



     (NOVOLIN N      16                                 skin 2           Medical



     SC)                                          (two)           [facility]



                                                  times           



                                                  daily.           

 

     flash      -0      Yes       [id number] 1{kit}      1 Kit           Unive

rs



     glucose      2-14                               daily.           ity of



     scanning      00:00:                                              [place]



     reader      00                                                Medical



     (FREESTYLE                                                        [facility]



     DENISE 2                                                        



     READER)                                                        



     Misc                                                        

 

     flash      3-0      Yes       [id number] 1{kit}      1 Kit           Unive

rs



     glucose      2-14                               every 14           ity of



     sensor      00:00:                               (fourteen)           [place]



     (FREESTYLE      00                                 days.           Medical



     DENISE 2                                                        [facility]



     SENSOR) Kit                                                        

 

     flash      3-0      Yes       [id number] 1{kit}      1 Kit           Unive

rs



     glucose      2-14                               daily.           ity of



     scanning      00:00:                                              [place]



     reader      00                                                Medical



     (FREESTYLE                                                        [facility]



     DENISE 2                                                        



     READER)                                                        



     Misc                                                        

 

     flash      3-0      Yes       [id number] 1{kit}      1 Kit           Unive

rs



     glucose      2-14                               every 14           ity of



     sensor      00:00:                               (fourteen)           [place]



     (FREESTYLE      00                                 days.           Medical



     DENISE 2                                                        [facility]



     SENSOR) Kit                                                        

 

     flash      2023-0      Yes       [id number] 1{kit}      1 Kit           Unive

rs



     glucose      2-14                               daily.           ity of



     scanning      00:00:                                              [place]



     reader      00                                                Medical



     (FREESTYLE                                                        [facility]



     DENISE 2                                                        



     READER)                                                        



     Misc                                                        

 

     flash      2023-0      Yes       [id number] 1{kit}      1 Kit           Unive

rs



     glucose      2-14                               every 14           ity of



     sensor      00:00:                               (fourteen)           [place]



     (FREESTYLE      00                                 days.           Medical



     DENISE 2                                                        [facility]



     SENSOR) Kit                                                        

 

     flash      2023-0      Yes       [id number] 1{kit}      1 Kit           Unive

rs



     glucose      2-14                               daily.           ity of



     scanning      00:00:                                              [place]



     reader      00                                                Medical



     (FREESTYLE                                                        [facility]



     DENISE 2                                                        



     READER)                                                        



     Misc                                                        

 

     flash      2023-0      Yes       [id number] 1{kit}      1 Kit           Unive

rs



     glucose      2-14                               every 14           ity of



     sensor      00:00:                               (fourteen)           [place]



     (FREESTYLE      00                                 days.           Medical



     DENISE 2                                                        [facility]



     SENSOR) Kit                                                        

 

     flash      2023-0      Yes       [id number] 1{kit}      1 Kit           Unive

rs



     glucose      2-14                               daily.           ity of



     scanning      00:00:                                              [place]



     reader      00                                                Medical



     (FREESTYLE                                                        [facility]



     DENISE 2                                                        



     READER)                                                        



     Misc                                                        

 

     flash      2023-0      Yes       [id number] 1{kit}      1 Kit           Unive

rs



     glucose      2-14                               every 14           ity of



     sensor      00:00:                               (fourteen)           [place]



     (FREESTYLE      00                                 days.           Medical



     DENISE 2                                                        [facility]



     SENSOR) Kit                                                        

 

     flash      2023-0      Yes       [id number] 1{kit}      1 Kit           Unive

rs



     glucose      2-14                               daily.           ity of



     scanning      00:00:                                              [place]



     reader      00                                                Medical



     (FREESTYLE                                                        [facility]



     DENISE 2                                                        



     READER)                                                        



     Misc                                                        

 

     flash      2023-0      Yes       [id number] 1{kit}      1 Kit           Unive

rs



     glucose      2-14                               every 14           ity of



     sensor      00:00:                               (fourteen)           [place]



     (FREESTYLE      00                                 days.           Medical



     DENISE 2                                                        [facility]



     SENSOR) Kit                                                        

 

     flash      2023-0      Yes       [id number] 1{kit}      1 Kit           Unive

rs



     glucose      2-14                               daily.           ity of



     scanning      00:00:                                              [place]



     reader      00                                                Medical



     (FREESTYLE                                                        [facility]



     DENISE 2                                                        



     READER)                                                        



     Misc                                                        

 

     flash      2023-0      Yes       [id number] 1{kit}      1 Kit           Unive

rs



     glucose      2-14                               every 14           ity of



     sensor      00:00:                               (fourteen)           [place]



     (FREESTYLE      00                                 days.           Medical



     DENISE 2                                                        [facility]



     SENSOR) Kit                                                        

 

     flash      2023-0      Yes       [id number] 1{kit}      1 Kit           Unive

rs



     glucose      2-14                               daily.           ity of



     scanning      00:00:                                              [place]



     reader      00                                                Medical



     (FREESTYLE                                                        [facility]



     DENISE 2                                                        



     READER)                                                        



     Misc                                                        

 

     flash      2023-0      Yes       [id number] 1{kit}      1 Kit           Unive

rs



     glucose      2-14                               every 14           ity of



     sensor      00:00:                               (fourteen)           [place]



     (FREESTYLE      00                                 days.           Medical



     DENISE 2                                                        [facility]



     SENSOR) Kit                                                        

 

     flash      2023-0      Yes       [id number] 1{kit}      1 Kit           Unive

rs



     glucose      2-14                               daily.           ity of



     scanning      00:00:                                              [place]



     reader      00                                                Medical



     (FREESTYLE                                                        [facility]



     DENISE 2                                                        



     READER)                                                        



     Misc                                                        

 

     flash      2023-0      Yes       [id number] 1{kit}      1 Kit           Unive

rs



     glucose      2-14                               every 14           ity of



     sensor      00:00:                               (fourteen)           [place]



     (FREESTYLE      00                                 days.           Medical



     DENISE 2                                                        [facility]



     SENSOR) Kit                                                        

 

     flash      2023-0      Yes       [id number] 1{kit}      1 Kit           Unive

rs



     glucose      2-14                               daily.           ity of



     scanning      00:00:                                              [place]



     reader      00                                                Medical



     (FREESTYLE                                                        [facility]



     DENISE 2                                                        



     READER)                                                        



     Misc                                                        

 

     flash      2023-0      Yes       [id number] 1{kit}      1 Kit           Unive

rs



     glucose      2-14                               every 14           ity of



     sensor      00:00:                               (fourteen)           [place]



     (FREESTYLE      00                                 days.           Medical



     DENISE 2                                                        [facility]



     SENSOR) Kit                                                        

 

     flash      2023-0      Yes       [id number] 1{kit}      1 Kit           Unive

rs



     glucose      2-14                               daily.           ity of



     scanning      00:00:                                              [place]



     reader      00                                                Medical



     (FREESTYLE                                                        [facility]



     DENISE 2                                                        



     READER)                                                        



     Misc                                                        

 

     flash      2023-0      Yes       [id number] 1{kit}      1 Kit           Unive

rs



     glucose      2-14                               every 14           ity of



     sensor      00:00:                               (fourteen)           [place]



     (FREESTYLE      00                                 days.           Medical



     DENISE 2                                                        [facility]



     SENSOR) Kit                                                        

 

     flash      2023-0      Yes       [id number] 1{kit}      1 Kit           Unive

rs



     glucose      2-14                               daily.           ity of



     scanning      00:00:                                              [place]



     reader      00                                                Medical



     (FREESTYLE                                                        [facility]



     DENISE 2                                                        



     READER)                                                        



     Misc                                                        

 

     flash      2023-0      Yes       [id number] 1{kit}      1 Kit           Unive

rs



     glucose      2-14                               every 14           ity of



     sensor      00:00:                               (fourteen)           [place]



     (FREESTYLE      00                                 days.           Medical



     DENISE 2                                                        [facility]



     SENSOR) Kit                                                        

 

     flash      2023-0      Yes       [id number] 1{kit}      1 Kit           Unive

rs



     glucose      2-14                               daily.           ity of



     scanning      00:00:                                              [place]



     reader      00                                                Medical



     (FREESTYLE                                                        [facility]



     DENISE 2                                                        



     READER)                                                        



     Misc                                                        

 

     flash      2023-0      Yes       [id number] 1{kit}      1 Kit           Unive

rs



     glucose      2-14                               every 14           ity of



     sensor      00:00:                               (fourteen)           [place]



     (FREESTYLE      00                                 days.           Medical



     DENISE 2                                                        [facility]



     SENSOR) Kit                                                        

 

     flash      2023-0      Yes       [id number] 1{kit}      1 Kit           Unive

rs



     glucose      2-14                               daily.           ity of



     scanning      00:00:                                              [place]



     reader      00                                                Medical



     (FREESTYLE                                                        [facility]



     DENISE 2                                                        



     READER)                                                        



     Misc                                                        

 

     flash      2023-0      Yes       [id number] 1{kit}      1 Kit           Unive

rs



     glucose      2-14                               every 14           ity of



     sensor      00:00:                               (fourteen)           [place]



     (FREESTYLE      00                                 days.           Medical



     DENISE 2                                                        [facility]



     SENSOR) Kit                                                        

 

     flash      2023-0      Yes       [id number] 1{kit}      1 Kit           Unive

rs



     glucose      2-14                               daily.           ity of



     scanning      00:00:                                              [place]



     reader      00                                                Medical



     (FREESTYLE                                                        [facility]



     DENISE 2                                                        



     READER)                                                        



     Misc                                                        

 

     flash      2023-0      Yes       [id number] 1{kit}      1 Kit           Unive

rs



     glucose      2-14                               every 14           ity of



     sensor      00:00:                               (fourteen)           [place]



     (FREESTYLE      00                                 days.           Medical



     DENISE 2                                                        [facility]



     SENSOR) Kit                                                        

 

     flash      2023-0      Yes       [id number] 1{kit}      1 Kit           Unive

rs



     glucose      2-14                               daily.           ity of



     scanning      00:00:                                              [place]



     reader      00                                                Medical



     (FREESTYLE                                                        [facility]



     DENISE 2                                                        



     READER)                                                        



     Misc                                                        

 

     flash      2023-0      Yes       [id number] 1{kit}      1 Kit           Unive

rs



     glucose      2-14                               every 14           ity of



     sensor      00:00:                               (fourteen)           [place]



     (FREESTYLE      00                                 days.           Medical



     DENISE 2                                                        [facility]



     SENSOR) Kit                                                        

 

     flash      2023-0      Yes       [id number] 1{kit}      1 Kit           Unive

rs



     glucose      2-14                               daily.           ity of



     scanning      00:00:                                              [place]



     reader      00                                                Medical



     (FREESTYLE                                                        Branch



     DENISE 2                                                        



     READER)                                                        



     Misc                                                        

 

     flash      2023-0      Yes       [id number] 1{kit}      1 Kit           Unive

rs



     glucose      2-14                               every 14           ity of



     sensor      00:00:                               (fourteen)           [place]



     (FREESTYLE      00                                 days.           Medical



     DENISE 2                                                        [facility]



     SENSOR) Kit                                                        

 

     flash      2023-0      Yes       [id number] 1{kit}      1 Kit           Unive

rs



     glucose      2-14                               daily.           ity of



     scanning      00:00:                                              [place]



     reader      00                                                Medical



     (FREESTYLE                                                        Branch



     DENISE 2                                                        



     READER)                                                        



     Misc                                                        

 

     flash      2023-0      Yes       [id number] 1{kit}      1 Kit           Unive

rs



     glucose      2-14                               every 14           ity of



     sensor      00:00:                               (fourteen)           [place]



     (FREESTYLE      00                                 days.           Medical



     DENISE 2                                                        [facility]



     SENSOR) Kit                                                        

 

     flash      2023-0      Yes       [id number] 1{kit}      1 Kit           Unive

rs



     glucose      2-14                               daily.           ity of



     scanning      00:00:                                              [place]



     reader      00                                                Medical



     (FREESTYLE                                                        Branch



     DENISE 2                                                        



     READER)                                                        



     Misc                                                        

 

     flash      2023-0      Yes       [id number] 1{kit}      1 Kit           Unive

rs



     glucose      2-14                               every 14           ity of



     sensor      00:00:                               (fourteen)           [place]



     (FREESTYLE      00                                 days.           Medical



     DENISE 2                                                        [facility]



     SENSOR) Kit                                                        

 

     flash      2023-0      Yes       [id number] 1{kit}      1 Kit           Unive

rs



     glucose      2-14                               daily.           ity of



     scanning      00:00:                                              [place]



     reader      00                                                Medical



     (FREESTYLE                                                        Branch



     DENISE 2                                                        



     READER)                                                        



     Misc                                                        

 

     flash      2023-0      Yes       [id number] 1{kit}      1 Kit           Unive

rs



     glucose      2-14                               every 14           ity of



     sensor      00:00:                               (fourteen)           [place]



     (FREESTYLE      00                                 days.           Medical



     DENISE 2                                                        [facility]



     SENSOR) Kit                                                        

 

     flash      2023-0      Yes       [id number] 1{kit}      1 Kit           Unive

rs



     glucose      2-14                               daily.           ity of



     scanning      00:00:                                              [place]



     reader      00                                                Medical



     (FREESTYLE                                                        Branch



     DENISE 2                                                        



     READER)                                                        



     Misc                                                        

 

     flash      2023-0      Yes       [id number] 1{kit}      1 Kit           Unive

rs



     glucose      2-14                               every 14           ity of



     sensor      00:00:                               (fourteen)           [place]



     (FREESTYLE      00                                 days.           Medical



     DENISE 2                                                        [facility]



     SENSOR) Kit                                                        

 

     flash      2023-0      Yes       [id number] 1{kit}      1 Kit           Unive

rs



     glucose      2-14                               daily.           ity of



     scanning      00:00:                                              [place]



     reader      00                                                Medical



     (FREESTYLE                                                        Branch



     DENISE 2                                                        



     READER)                                                        



     Misc                                                        

 

     flash      2023-0      Yes       [id number] 1{kit}      1 Kit           [facility]

rs



     glucose      2-14                               every 14           ity of



     sensor      00:00:                               (fourteen)           [place]



     (FREESTYLE      00                                 days.           Medical



     [facility] 2                                                        Branch



     SENSOR) Kit                                                        

 

     flash      2023-0      Yes       [id number] 1{kit}      1 Kit           Un[facility]

rs



     glucose      2-14                               daily.           ity of



     scanning      00:00:                                              [place]



     reader      00                                                Medical



     (FREESTYLE                                                        Branch



     DENISE 2                                                        



     READER)                                                        



     Misc                                                        

 

     flash      2023-0      Yes       [id number] 1{kit}      1 Kit           [facility]

rs



     glucose      2-14                               every 14           ity of



     sensor      00:00:                               (fourteen)           [place]



     (FREESTYLE      00                                 days.           Medical



     DEN[facility] 2                                                        Branch



     SENSOR) Kit                                                        

 

     flash      2023-0      Yes       [id number] 1{kit}      1 Kit           [facility]

rs



     glucose      2-14                               daily.           ity of



     scanning      00:00:                                              [place]



     reader      00                                                Medical



     (FREESTYLE                                                        Branch



     DENISE 2                                                        



     READER)                                                        



     Misc                                                        

 

     flash      2023-0      Yes       [id number] 1{kit}      1 Kit           [facility]

rs



     glucose      2-14                               every 14           ity of



     sensor      00:00:                               (fourteen)           [place]



     (FREESTYLE      00                                 days.           Medical



     DEN[facility] 2                                                        Branch



     SENSOR) Kit                                                        

 

     flash      2023-0      Yes       [id number] 1{kit}      1 Kit           [facility]

rs



     glucose      2-14                               daily.           ity of



     scanning      00:00:                                              [place]



     reader      00                                                Medical



     (FREESTYLE                                                        Branch



     DENISE 2                                                        



     READER)                                                        



     Misc                                                        

 

     flash      2023-0      Yes       [id number] 1{kit}      1 Kit           Un[facility]

rs



     glucose      2-14                               every 14           ity of



     sensor      00:00:                               (fourteen)           [place]



     (FREESTYLE      00                                 days.           Medical



     DEN[facility] 2                                                        Branch



     SENSOR) Kit                                                        

 

     flash      2023-0      Yes       [id number] 1{kit}      1 Kit           Un[facility]

rs



     glucose      2-14                               daily.           ity of



     scanning      00:00:                                              [place]



     reader      00                                                Medical



     (FREESTYLE                                                        Branch



     DENISE 2                                                        



     READER)                                                        



     Misc                                                        

 

     flash      2023-0      Yes       [id number] 1{kit}      1 Kit           Un[facility]

rs



     glucose      2-14                               every 14           ity of



     sensor      00:00:                               (fourteen)           [place]



     (FREESTYLE      00                                 days.           Medical



     [facility] 2                                                        Branch



     SENSOR) Kit                                                        

 

     flash      2023-0      Yes       [id number] 1{kit}      1 Kit           Unive

rs



     glucose      2-14                               daily.           ity of



     scanning      00:00:                                              [place]



     reader      00                                                Medical



     (FREESTYLE                                                        Branch



     DENISE 2                                                        



     READER)                                                        



     Misc                                                        

 

     flash      2023-0      Yes       [id number] 1{kit}      1 Kit           Un[facility]

rs



     glucose      2-14                               every 14           ity of



     sensor      00:00:                               (fourteen)           [place]



     (FREESTYLE      00                                 days.           Medical



     [facility] 2                                                        Branch



     SENSOR) Kit                                                        

 

     flash      2023-0      Yes       [id number] 1{kit}      1 Kit           [facility]

rs



     glucose      2-14                               daily.           ity of



     scanning      00:00:                                              [place]



     reader      00                                                Medical



     (FREESTYLE                                                        Branch



     DENISE 2                                                        



     READER)                                                        



     Misc                                                        

 

     flash      2023-0      Yes       [id number] 1{kit}      1 Kit           [facility]

rs



     glucose      2-14                               every 14           ity of



     sensor      00:00:                               (fourteen)           [place]



     (FREESTYLE      00                                 days.           Medical



     [facility] 2                                                        Branch



     SENSOR) Kit                                                        

 

     flash      2023-0      Yes       [id number] 1{kit}      1 Kit           [facility]

rs



     glucose      2-14                               daily.           ity of



     scanning      00:00:                                              [place]



     reader      00                                                Medical



     (FREESTYLE                                                        Branch



     DENISE 2                                                        



     READER)                                                        



     Misc                                                        

 

     flash      2023-0      Yes       [id number] 1{kit}      1 Kit           [facility]

rs



     glucose      2-14                               every 14           ity of



     sensor      00:00:                               (fourteen)           [place]



     (FREESTYLE      00                                 days.           Medical



     [facility] 2                                                        Branch



     SENSOR) Kit                                                        

 

     flash      2023-0      Yes       [id number] 1{kit}      1 Kit           [facility]

rs



     glucose      2-14                               daily.           ity of



     scanning      00:00:                                              [place]



     reader      00                                                Medical



     (FREESTYLE                                                        Branch



     DENISE 2                                                        



     READER)                                                        



     Misc                                                        

 

     flash      2023-0      Yes       [id number] 1{kit}      1 Kit           [facility]

rs



     glucose      2-14                               every 14           ity of



     sensor      00:00:                               (fourteen)           [place]



     (FREESTYLE      00                                 days.           Medical



     [facility] 2                                                        Branch



     SENSOR) Kit                                                        

 

     flash      2023-0      Yes       [id number] 1{kit}      1 Kit           [facility]

rs



     glucose      2-14                               daily.           ity of



     scanning      00:00:                                              [place]



     reader      00                                                Medical



     (FREESTYLE                                                        Branch



     DENISE 2                                                        



     READER)                                                        



     Misc                                                        

 

     flash      2023-0      Yes       [id number] 1{kit}      1 Kit           [facility]

rs



     glucose      2-14                               every 14           ity of



     sensor      00:00:                               (fourteen)           [place]



     (FREESTYLE      00                                 days.           Medical



     [facility] 2                                                        Branch



     SENSOR) Kit                                                        

 

     flash      2023-0      Yes       [id number] 1{kit}      1 Kit           [facility]

rs



     glucose      2-14                               daily.           ity of



     scanning      00:00:                                              [place]



     reader      00                                                Medical



     (FREESTYLE                                                        Branch



     DENISE 2                                                        



     READER)                                                        



     Misc                                                        

 

     flash      2023-0      Yes       [id number] 1{kit}      1 Kit           [facility]

rs



     glucose      2-14                               every 14           ity of



     sensor      00:00:                               (fourteen)           [place]



     (FREESTYLE      00                                 days.           Medical



     [facility] 2                                                        Branch



     SENSOR) Kit                                                        

 

     flash      2023-0      Yes       [id number] 1{kit}      1 Kit           [facility]

rs



     glucose      2-14                               daily.           ity of



     scanning      00:00:                                              [place]



     reader      00                                                Medical



     (FREESTYLE                                                        Branch



     DENISE 2                                                        



     READER)                                                        



     Misc                                                        

 

     flash      2023-0      Yes       [id number] 1{kit}      1 Kit           [facility]

rs



     glucose      2-14                               every 14           ity of



     sensor      00:00:                               (fourteen)           [place]



     (FREESTYLE      00                                 days.           Medical



     DEN[facility] 2                                                        Branch



     SENSOR) Kit                                                        

 

     flash      2023-0      Yes       [id number] 1{kit}      1 Kit           Un[facility]

rs



     glucose      2-14                               daily.           ity of



     scanning      00:00:                                              [place]



     reader      00                                                Medical



     (FREESTYLE                                                        Branch



     DENISE 2                                                        



     READER)                                                        



     Misc                                                        

 

     flash      2023-0      Yes       [id number] 1{kit}      1 Kit           Un[facility]

rs



     glucose      2-14                               every 14           ity of



     sensor      00:00:                               (fourteen)           [place]



     (FREESTYLE      00                                 days.           Medical



     [facility] 2                                                        Branch



     SENSOR) Kit                                                        

 

     flash      2023-0      Yes       [id number] 1{kit}      1 Kit           Un[facility]

rs



     glucose      2-14                               daily.           ity of



     scanning      00:00:                                              [place]



     reader      00                                                Medical



     (FREESTYLE                                                        Branch



     DENISE 2                                                        



     READER)                                                        



     Misc                                                        

 

     flash      2023-0      Yes       [id number] 1{kit}      1 Kit           Un[facility]

rs



     glucose      2-14                               every 14           ity of



     sensor      00:00:                               (fourteen)           [place]



     (FREESTYLE      00                                 days.           Medical



     [facility] 2                                                        Branch



     SENSOR) Kit                                                        

 

     flash      2023-0      Yes       [id number] 1{kit}      1 Kit           [facility]

rs



     glucose      2-14                               daily.           ity of



     scanning      00:00:                                              [place]



     reader      00                                                Medical



     (FREESTYLE                                                        Branch



     DENISE 2                                                        



     READER)                                                        



     Misc                                                        

 

     flash      2023-0      Yes       [id number] 1{kit}      1 Kit           Un[facility]

rs



     glucose      2-14                               every 14           ity of



     sensor      00:00:                               (fourteen)           [place]



     (FREESTYLE      00                                 days.           Medical



     [facility] 2                                                        Branch



     SENSOR) Kit                                                        

 

     flash      2023-0      Yes       [id number] 1{kit}      1 Kit           Un[facility]

rs



     glucose      2-14                               daily.           ity of



     scanning      00:00:                                              [place]



     reader      00                                                Medical



     (FREESTYLE                                                        Branch



     DENISE 2                                                        



     READER)                                                        



     Misc                                                        

 

     flash      2023-0      Yes       [id number] 1{kit}      1 Kit           [facility]

rs



     glucose      2-14                               every 14           ity of



     sensor      00:00:                               (fourteen)           [place]



     (FREESTYLE      00                                 days.           Medical



     DEN[facility] 2                                                        Branch



     SENSOR) Kit                                                        

 

     HYDROcodone      3-0 2023- No             1{tbl}      1 tablet,         

  [facility]



     -acetaminop      2-13 02-                          Oral,           ity of



     hen (NORCO)      21:15: 20:14                          ONCE, 1           [facility]

[place]



      mg      00   :00                           dose, On           [facility]

l



     tablet 1                                         Mon            Branch



     tablet                                         23 at           



                                                  1515,           



                                                  Routine           

 

     flash      2023-0      Yes       [id number] 1{kit}      1 Kit           [facility]

rs



     glucose      2-12                               every 14           ity of



     sensor      00:00:                               (fourteen)           [place]



     (FREESTYLE      00                                 days.           Medical



     DENISE 2                                                        Branch



     SENSOR) Kit                                                        

 

     flash      2023-0      Yes       [id number] 1{kit}      1 Kit           Un[facility]

rs



     glucose      2-12                               daily.           ity of



     scanning      00:00:                                              [place]



     reader      00                                                Medical



     (FREESTYLE                                                        Branch



     DENISE 2                                                        



     READER)                                                        



     Misc                                                        

 

     flash      2023-0      Yes       [id number] 1{kit}      1 Kit           Un[facility]

rs



     glucose      2-12                               every 14           ity of



     sensor      00:00:                               (fourteen)           [place]



     (FREESTYLE      00                                 days.           Medical



     DENISE 2                                                        Branch



     SENSOR) Kit                                                        

 

     flash            Yes       [id number] 1{kit}      1 Kit           [facility]

rs



     glucose      2-12                               daily.           ity of



     scanning      00:00:                                              [place]



     reader      00                                                Medical



     (FREESTYLE                                                        Branch



     DENISE 2                                                        



     READER)                                                        



     Misc                                                        

 

     flash      0 - No        [id number] 1{kit}      1 Kit           Univ

ers



     glucose      2-12 02-14                          every 14           ity of



     sensor      00:00: 00:00                          (fourteen)           [place]

[place]



     (FREESTYLE      00   :00                           days.           Medical



     DEN[facility] 2                                                        Branch



     SENSOR) Kit                                                        

 

     flash      2023- No        [id number] 1{kit}      1 Kit           Univ

ers



     glucose      2-12 02-14                          daily.           ity of



     scanning      00:00: 00:00                                         [place]



     reader      00   :00                                          Medical



     (FREESTYLE                                                        Branch



     DENISE 2                                                        



     READER)                                                        



     Misc                                                        

 

     insulin       No             12U       12 Units,           Univ

ers



     regular                                Slow IV           ity of



     human      02:00: 01:32                          Push,           [place]



     (HUMULIN R)      00   :00                           ONCE, 1           Medic

al



     injection                                         dose, On           Branch



     12 Units                                         Fri            



                                                  2/10/23 at           



                                                  2000,           



                                                  Routine<br           



                                                  >Indicatio           



                                                  n for           



                                                  insulin:           



                                                  Hyperglyce           



                                                  jarvis            

 

     cefTRIAXone      2023- No             1000mg      1,000 mg,         

  [facility]



     (ROCEPHIN)                                IV             ity of



     1,000 mg in      01:30: 02:39                          [place]



     NaCl 0.9%      00   :00                           ONCE, 1           Medical



     (NS) 50 mL                                         dose, On           Br[facility]

h



     MINI-BAG                                         Fri            



                                                  2/10/23 at           



                                                  1930,           



                                                  Administer           



                                                  over 30           



                                                  Minutes,           



                                                  50             



                                                  mL<br>Reas           



                                                  on for           



                                                  Anti-Infec           



                                                  tive:           



                                                  Documented           



                                                  Infection<           



                                                  br>Documen           



                                                  huma            



                                                  Infection           



                                                  Site:           



                                                  Urine<br&g           



                                                  t;Duration           



                                                  of             



                                                  Therapy: 7           



                                                  days           

 

     NaCl 0.9%      2023- No             1000mL      at 999           Uni

vers



     (NS) bolus      11                          mL/hr,           ity of



     infusion      00:00: 02:51                          1,000 mL,           [facility]

as



     1,000 mL      00   :00                           IV             Medical



                                                  Infusion,           [facility]



                                                  ONCE, 1           



                                                  dose, On           



                                                  Fri            



                                                  2/10/23 at           



                                                  1800, ASAP           

 

     proMETHazin      2023- No             25mg      25 mg, IV           

[facility]



     e         2-10 02-10                          Piggyback,           ity of



     (PHENERGAN)      23:15: 23:59                          ONCE, 1           [facility]

[facility]



     25 mg in      00   :00                           dose, On           Medical



     NaCl 0.9%                                         Fri            [facility]



     (NS) 50 mL                                         2/10/23 at           



     IV                                           1715, ASAP           



     piggyback                                                        

 

     FENTanyl PF      2023- No             50ug      50 mcg,           Un

[facility]



     (SUBLIMAZE      2-10 02-10                          Slow IV           ity o

f



     (PF))      01:15: 00:23                          Push,           [place]



     injection      00   :00                           ONCE, 1           Medical



     50 mcg                                         dose, On           [facility]



                                                  Thu 23           



                                                  at 1915,           



                                                  Routine           

 

     insulin       No             10U       10 Units,           Univ

ers



     regular                                Slow IV           ity of



     human      22:45: 22:13                          Push,           [place]



     (HUMULIN R)      00   :00                           ONCE, 1           Medic

al



     injection                                         dose, On           [facility]



     10 Units                                         u 23           



                                                  at 1645,           



                                                  Routine<br           



                                                  >Indicatio           



                                                  n for           



                                                  insulin:           



                                                  Hyperglyce           



                                                  jarvis            

 

     NaCl 0.9%      2023- No             1000mL      at 999           Uni

vers



     (NS) bolus      -10                          mL/hr,           ity of



     infusion      22:00: 00:01                          1,000 mL,           [facility]

as



     1,000 mL      00   :00                           IV             Medical



                                                  Infusion,           [facility]



                                                  ONCE, 1           



                                                  dose, On           



                                                  u 23           



                                                  at 1600,           



                                                  ASAP           

 

     proMETHazin       No             25mg      25 mg, IV           

[facility]



     e                                   Piggyback,           ity of



     (PHENERGAN)      21:30: 22:13                          ONCE, 1           [facility]

[facility]



     25 mg in      00   :00                           dose, On           Medical



     NaCl 0.9%                                         u 23           [facility]

ch



     (NS) 50 mL                                         at 1530,           



     IV                                           ASAP           



     piggyback                                                        

 

     enoxaparin            Yes            40mg      40 mg,           Unive

rs



     (LOVENOX)                                     Subcutaneo           ity 

of



     injection      23:00:                               us, DAILY,           Te

[facility]



     40 mg      00                                 First dose           Medical



                                                  on Thu           [facility]



                                                  23 at           



                                                  1700,           



                                                  Until           



                                                  Discontinu           



                                                  ed,            



                                                  Routine           

 

     HYDROmorpho            Yes            4mg       Take 4 mg           U

nivers



     ne 4 mg      2-02                               by mouth           ity of



     tablet      19:25:                               in the           [place]



               48                                 morning           Medical



                                                  and 4 mg           [facility]



                                                  at noon           



                                                  and 4 mg           



                                                  in the           



                                                  evening.           

 

     insulin NPH      2023-0      Yes            45U       inject 45           U

nivers



     human      2-02                               Units           ity of



     isophane      19:25:                               under the           [place]

[facility]



     (NOVOLIN N      48                                 skin 2           Medical



     SC)                                          (two)           [facility]



                                                  times           



                                                  daily.           

 

     HYDROmorpho      2023-0      Yes            4mg       Take 4 mg           U

nivers



     ne 4 mg      2-02                               by mouth           ity of



     tablet      19:25:                               in the           [place]



               48                                 morning           Medical



                                                  and 4 mg           [facility]



                                                  at noon           



                                                  and 4 mg           



                                                  in the           



                                                  evening.           

 

     insulin NPH      2023-0      Yes            45U       inject 45           U

nivers



     human      2-02                               Units           ity of



     isophane      19:25:                               under the           [place]

[facility]



     (NOVOLIN N      48                                 skin 2           Medical



     SC)                                          (two)           [facility]



                                                  times           



                                                  daily.           

 

     HYDROmorpho      2023-0      Yes            4mg       Take 4 mg           U

nivers



     ne 4 mg      2-02                               by mouth           ity of



     tablet      19:25:                               in the           [place]



               48                                 morning           Medical



                                                  and 4 mg           [facility]



                                                  at noon           



                                                  and 4 mg           



                                                  in the           



                                                  evening.           

 

     insulin NPH      2023-0      Yes            45U       inject 45           U

nivers



     human      2-02                               Units           ity of



     isophane      19:25:                               under the           [place]

[facility]



     (NOVOLIN N      48                                 skin 2           Medical



     SC)                                          (two)           [facility]



                                                  times           



                                                  daily.           

 

     HYDROmorpho      2023-0      Yes            4mg       Take 4 mg           U

nivers



     ne 4 mg      2-02                               by mouth           ity of



     tablet      19:25:                               in the           [place]



               48                                 morning           Medical



                                                  and 4 mg           [facility]



                                                  at noon           



                                                  and 4 mg           



                                                  in the           



                                                  evening.           

 

     insulin NPH      2023-0      Yes            45U       inject 45           U

nivers



     human      2-02                               Units           ity of



     isophane      19:25:                               under the           [place]

[facility]



     (NOVOLIN N      48                                 skin 2           Medical



     SC)                                          (two)           [facility]



                                                  times           



                                                  daily.           

 

     HYDROmorpho      2023-0      Yes            4mg       Take 4 mg           U

nivers



     ne 4 mg      2-02                               by mouth           ity of



     tablet      19:25:                               in the           [place]



               48                                 morning           Medical



                                                  and 4 mg           [facility]



                                                  at noon           



                                                  and 4 mg           



                                                  in the           



                                                  evening.           

 

     insulin NPH      2023-0      Yes            45U       inject 45           U

nivers



     human      2-02                               Units           ity of



     isophane      19:25:                               under the           [place]

[facility]



     (NOVOLIN N      48                                 skin 2           Medical



     SC)                                          (two)           [facility]



                                                  times           



                                                  daily.           

 

     HYDROmorpho      2023-0      Yes            4mg       Take 4 mg           U

nivers



     ne 4 mg      2-02                               by mouth           ity of



     tablet      19:25:                               in the           [place]



               48                                 morning           Medical



                                                  and 4 mg           [facility]



                                                  at noon           



                                                  and 4 mg           



                                                  in the           



                                                  evening.           

 

     insulin NPH      2023-0      Yes            45U       inject 45           U

nivers



     human      2-02                               Units           ity of



     isophane      19:25:                               under the           [place]

[facility]



     (NOVOLIN N      48                                 skin 2           Medical



     SC)                                          (two)           [facility]



                                                  times           



                                                  daily.           

 

     HYDROmorpho      2023-0      Yes            4mg       Take 4 mg           U

nivers



     ne 4 mg      2-02                               by mouth           ity of



     tablet      19:25:                               in the           [place]



               48                                 morning           Medical



                                                  and 4 mg           [facility]



                                                  at noon           



                                                  and 4 mg           



                                                  in the           



                                                  evening.           

 

     insulin NPH      2023-0      Yes            45U       inject 45           U

nivers



     human      2-02                               Units           ity of



     isophane      19:25:                               under the           [place]

[facility]



     (NOVOLIN N      48                                 skin 2           Medical



     SC)                                          (two)           [facility]



                                                  times           



                                                  daily.           

 

     HYDROmorpho      2023-0      Yes            4mg       Take 4 mg           U

nivers



     ne 4 mg      2-02                               by mouth           ity of



     tablet      19:25:                               in the           [place]



               48                                 morning           Medical



                                                  and 4 mg           [facility]



                                                  at noon           



                                                  and 4 mg           



                                                  in the           



                                                  evening.           

 

     insulin NPH      2023-0      Yes            45U       inject 45           U

nivers



     human      2-02                               Units           ity of



     isophane      19:25:                               under the           [place]

[facility]



     (NOVOLIN N      48                                 skin 2           Medical



     SC)                                          (two)           [facility]



                                                  times           



                                                  daily.           

 

     HYDROmorpho      2023-0      Yes            4mg       Take 4 mg           U

nivers



     ne 4 mg      2-02                               by mouth           ity of



     tablet      19:25:                               in the           [place]



               48                                 morning           Medical



                                                  and 4 mg           [facility]



                                                  at noon           



                                                  and 4 mg           



                                                  in the           



                                                  evening.           

 

     insulin NPH      2023-0      Yes            45U       inject 45           U

nivers



     human      2-02                               Units           ity of



     isophane      19:25:                               under the           [place]

[facility]



     (NOVOLIN N      48                                 skin 2           Medical



     SC)                                          (two)           [facility]



                                                  times           



                                                  daily.           

 

     HYDROmorpho      2023-0      Yes            4mg       Take 4 mg           U

nivers



     ne 4 mg      2-02                               by mouth           ity of



     tablet      19:25:                               in the           [place]



               48                                 morning           Medical



                                                  and 4 mg           [facility]



                                                  at noon           



                                                  and 4 mg           



                                                  in the           



                                                  evening.           

 

     insulin NPH      2023-0      Yes            45U       inject 45           U

nivers



     human      2-02                               Units           ity of



     isophane      19:25:                               under the           [place]

[facility]



     (NOVOLIN N      48                                 skin 2           Medical



     SC)                                          (two)           [facility]



                                                  times           



                                                  daily.           

 

     HYDROmorpho      2023-0      Yes            4mg       Take 4 mg           U

nivers



     ne 4 mg      2-02                               by mouth           ity of



     tablet      19:25:                               in the           [place]



               48                                 morning           Medical



                                                  and 4 mg           [facility]



                                                  at noon           



                                                  and 4 mg           



                                                  in the           



                                                  evening.           

 

     insulin NPH      -0      Yes            45U       inject 45           U

nivers



     human      2-02                               Units           ity of



     isophane      19:25:                               under the           [place]

[facility]



     (NOVOLIN N      48                                 skin 2           Medical



     SC)                                          (two)           [facility]



                                                  times           



                                                  daily.           

 

     HYDROmorpho      2023-0      Yes            4mg       Take 4 mg           U

nivers



     ne 4 mg      2-02                               by mouth           ity of



     tablet      19:25:                               in the           [place]



               48                                 morning           Medical



                                                  and 4 mg           [facility]



                                                  at noon           



                                                  and 4 mg           



                                                  in the           



                                                  evening.           

 

     insulin NPH      -0      Yes            45U       inject 45           U

nivers



     human      2-02                               Units           ity of



     isophane      19:25:                               under the           [place]

[facility]



     (NOVOLIN N      48                                 skin 2           Medical



     SC)                                          (two)           [facility]



                                                  times           



                                                  daily.           

 

     magnesium      0      Yes            400mg      400 mg,           Univ

ers



     oxide                                     Oral, BID,           ity of



     (MAG-OX      14:00:                               First dose           [place]

[facility]



     400) tablet      00                                 on u           [facility]

l



     400 mg                                         23 at           [facility]



                                                  0800,           



                                                  Until           



                                                  Discontinu           



                                                  ed,            



                                                  Routine           

 

     Sliding      -0      Yes                      Subcutaneo           Univ

ers



     Scale                                     , TID           ity of



     Insulin -      14:00:                               MEALS,           [place]



     Lispro      00                                 First dose           Medical



     (HumaLOG) +                                         on u           [facility]



     Fsbg                                         23 at           



     Testing                                         0800,           



                                                  Until           



                                                  Discontinu           



                                                  ed,            



                                                  Routine           

 

     magnesium      -0 2023- No             2g        2 g, IV           Univ

ers



     sulfate in                                Piggyback,           it

y of



     water 2      14:00: 15:41                          Administer           [facility]

as



     gram/50 mL      00   :00                           over 60           Medica

l



     (4 %)                                         Minutes,           [facility]



     infusion 2                                         ONCE, 1           



     g                                            dose, On           



                                                  Thu 23           



                                                  at 0800,           



                                                  Routine           

 

     HYDROcodone      -0      Yes            1{tbl}      1 tablet,          

 [facility]



     -acetaminop                                     Oral,           ity of



     hen (NORCO)      13:47:                               [id number],           [facility]

as



      mg      52                                 Starting           [facility]

l



     tablet 1                                         on u           [facility]



     tablet                                         23 at           



                                                  0747,           



                                                  Until           



                                                  Discontinu           



                                                  ed,            



                                                  Routine,           



                                                  Pain           



                                                  (scale           



                                                  4-6)           

 

     glipiZIDE      -0      Yes            5mg       5 mg,           [facility]



     (GLUCOTROL)                                     Oral,           ity of



     tablet 5 mg      13:30:                               BIDAC,           [place]

[facility]



               00                                 First dose           [facility]



                                                  on u           [facility]



                                                  23 at           



                                                  0730,           



                                                  Until           



                                                  Discontinu           



                                                  ed,            



                                                  Routine           

 

     NaCl 0.9%      -0      Yes            1000mL      at 150           Univ

ers



     (NS) IV      2-02                               mL/hr, IV           ity of



     infusion      09:15:                               Infusion,           [place]

s



     1,000 mL      00                                 CONTINUOUS           Medic

al



                                                  , Starting           [facility]



                                                  on u           



                                                  23 at           



                                                  0315,           



                                                  Until           



                                                  Discontinu           



                                                  ed,            



                                                  Routine           

 

     NaCl 0.9%      -0 - No             1000mL      at 999           Uni

vers



     (NS) bolus       02-02                          mL/hr,           ity of



     infusion      09:00: 10:08                          1,000 mL,           [facility]

as



     1,000 mL      00   :51                           IV             Medical



                                                  Infusion,           [place]



                                                  ONCE, 1           



                                                  dose, On           



                                                  u 23           



                                                  at 0300,           



                                                  STAT           

 

     sennosides      -0      Yes            8.6mg      8.6 mg,           Uni

vers



     (SENOKOT)                                     Oral, BID,           ity 

of



     tablet 8.6      08:30:                               First dose           [facility]

[facility]



     mg        00                                 on Thu           Medical



                                                  23 at           [facility]



                                                  0230,           



                                                  Until           



                                                  Discontinu           



                                                  ed,            



                                                  Routine           

 

     docusate      -0      Yes            100mg      100 mg,           Un[facility]

rs



     (COLACE)                                     Oral, BID,           ity o

f



     capsule 100      08:30:                               First dose           

[place]



     mg        00                                 on Thu           Medical



                                                  23 at           [facility]



                                                  0230,           



                                                  Until           



                                                  Discontinu           



                                                  ed,            



                                                  Routine           

 

     insulin NPH      -0      Yes            20U       20 Units,           U

nivers



     and regular      02                               Subcutaneo           it

y of



     human 70-30      08:30:                               us, BIDAC,           

[place]



     (70-30      00                                 First dose           Medical



     U-100                                         (after           [facility]



     INSULIN)                                         last           



     100 unit/mL                                         modificati           



     (70-30)                                         on) on u           



     injection                                         23 at           



     20 Units                                         0230,           



                                                  Until           



                                                  Discontinu           



                                                  ed,            



                                                  Routine           

 

     proCHLORper      -0      Yes            10mg      10 mg,           Univ

ers



     azine      2-02                               Slow IV           ity of



     (COMPAZINE)      08:19:                               Push,           [place]



     injection      04                                 Q6HPRN,           Medical



     10 mg                                         Starting           [facility]



                                                  on Thu           



                                                  23 at           



                                                  0219,           



                                                  Until           



                                                  Discontinu           



                                                  ed,            



                                                  Routine,           



                                                  Nausea and           



                                                  Vomiting           



                                                  (N/V)           

 

     FENTanyl PF            Yes            50ug      50 mcg,           Uni

vers



     (SUBLIMAZE                                     Slow IV           ity of



     (PF))      05:33:                               Push,           [place]



     injection      35                                 Q4HPRN,           Medical



     50 mcg                                         Starting           [facility]



                                                  on 23 at           



                                                  2333,           



                                                  Until           



                                                  Discontinu           



                                                  ed,            



                                                  Routine,           



                                                  Pain           



                                                  (scale           



                                                  7-10)           

 

     sodium      2023- No             30g       30 g,           [facility]



     polystyrene                                Oral,           ity of



     sulfonate      03:15: 05:11                          ONCE, 1           [place]

s



     (KAYEXALATE      00   :00                           dose, On           Medi

sarah



     ) 15                                         23           [facility]



     gram/60 mL                                         at 2115,           



     suspension                                         Routine           



     30 g                                                        

 

     insulin      2023- No             5U        5 Units,           [facility]

rs



     regular                                Slow IV           ity of



     human      02:15: 01:10                          Push,           [place]



     (HUMULIN R)      00   :00                           ONCE, 1           Medic

al



     injection 5                                         dose, On           [facility]

ch



     Units                                         23           



                                                  at 2015,           



                                                  STAT<br>In           



                                                  dication           



                                                  for            



                                                  insulin:           



                                                  Hyperglyce           



                                                  jarvis            

 

     cefTRIAXone      2023- No             1000mg      1,000 mg,         

  [facility]



     (ROCEPHIN)                                IV             ity of



     1,000 mg in      01:45: 02:27                          [place], [place]



     NaCl 0.9%      00   :00                           ONCE, 1           Medical



     (NS) 50 mL                                         dose, On           [facility]

h



     MINI-BAG                                         23           



                                                  at 1945,           



                                                  Administer           



                                                  over 30           



                                                  Minutes,           



                                                  50             



                                                  mL<br&gt;R           



                                                  [place] for           



                                                  Anti-Infec           



                                                  tive:           



                                                  Documented           



                                                  Infection<           



                                                  br>Documen           



                                                  huma            



                                                  Infection           



                                                  Site:           



                                                  Urine<br&g           



                                                  t;Duration           



                                                  of             



                                                  Therapy:           



                                                  Other (see           



                                                  Comments)           

 

     acetaminoph      2023- No             1000mg      1,000 mg,         

  [facility]



     en                                  Oral,           ity of



     (TYLENOL)      01:30: 01:28                          ONCE, 1           [place]

[facility]



     tablet      00   :00                           dose, On           Medical



     1,000 mg                                         Wed 2/1/23           [facility]

h



                                                  at 1930,           



                                                  ASAP           

 

     iopamidol      2023- No        [id number] 80mL      80 mL,           U

nivers



     (ISOVUE                                Intravenou           ity o

f



     370-500 mL)      00:45: 00:45                          s, ONCE, 1          

 [place]



     injection      00   :00                           dose, On           Medica

l



     80 mL                                         Wed 23           [facility]



                                                  at 1845,           



                                                  Routine           

 

     FENTanyl PF      2023- No             75ug      75 mcg,           Un

yoselyn



     (SUBLIMAZE                                Slow IV           ity o

f



     (PF))      22:45: 22:19                          Push,           [place]



     injection      00   :00                           ONCE, 1           Medical



     75 mcg                                         dose, On           [facility]



                                                  23           



                                                  at 1645,           



                                                  STAT           

 

     insulin      2023- No             10U       10 Units,           Univ

ers



     regular                                Slow IV           ity of



     human      22:30: 21:28                          Push,           [place]



     (HUMULIN R)      00   :00                           ONCE, 1           Medic

al



     injection                                         dose, On           [facility]



     10 Units                                         23           



                                                  at 1630,           



                                                  STAT<br>In           



                                                  dication           



                                                  for            



                                                  insulin:           



                                                  Hyperglyce           



                                                  jarvis            

 

     NaCl 0.9%      2023- No             1000mL      at 999           Uni

vers



     (NS) bolus                                mL/hr,           ity of



     infusion      22:30: 23:40                          1,000 mL,           [facility]

as



     1,000 mL      00   :00                           IV             Medical



                                                  Infusion,           [facility]



                                                  ONCE, 1           



                                                  dose, On           



                                                  23           



                                                  at 1630,           



                                                  STAT           

 

     lisinopriL      -0      Yes       [id number] 2.5mg      Take 1           

[facility]



     2.5 mg      1-01                               tablet by           ity of



     tablet      00:00:                               mouth in           [place]



               00                                 the            Medical



                                                  morning.           [facility]

 

     lisinopriL      -0      Yes       [id number] 2.5mg      Take 1           

[facility]



     2.5 mg      1-01                               tablet by           ity of



     tablet      00:00:                               mouth in           [place]



               00                                 the            Medical



                                                  morning.           [facility]

 

     lisinopriL      -0      Yes       [id number] 2.5mg      Take 1           

[facility]



     2.5 mg      1-01                               tablet by           ity of



     tablet      00:00:                               mouth in           [place]



               00                                 the            Medical



                                                  morning.           [place]

 

     lisinopriL      -0      Yes       [id number] 2.5mg      Take 1           

[facility]



     2.5 mg      1-01                               tablet by           ity of



     tablet      00:00:                               mouth in           [place]



               00                                 the            Medical



                                                  morning.           [facility]

 

     lisinopriL      2023-0      Yes       [id number] 2.5mg      Take 1           

[facility]



     2.5 mg      1-01                               tablet by           ity of



     tablet      00:00:                               mouth in           [place]



                                                the            Medical



                                                  morning.           [facility]

 

     lisinopriL      2023-0      Yes       [id number] 2.5mg      Take 1           

[facility]



     2.5 mg      1-01                               tablet by           ity of



     tablet      00:00:                               mouth in           [place]



                                                the            Medical



                                                  morning.           [facility]

 

     lisinopriL      2023-0      Yes       [id number] 2.5mg      Take 1           

[facility]



     2.5 mg      1-01                               tablet by           ity of



     tablet      00:00:                               mouth in           [place]



                                                the            Medical



                                                  morning.           [facility]

 

     lisinopriL      2023-0      Yes       [id number] 2.5mg      Take 1           

[facility]



     2.5 mg      1-01                               tablet by           ity of



     tablet      00:00:                               mouth in           [place]



                                                the            Medical



                                                  morning.           [facility]

 

     lisinopriL      2023-0      Yes       [id number] 2.5mg      Take 1           

[facility]



     2.5 mg      1-01                               tablet by           ity of



     tablet      00:00:                               mouth in           [place]



                                                the            Medical



                                                  morning.           [facility]

 

     lisinopriL      2023-0      Yes       [id number] 2.5mg      Take 1           

[facility]



     2.5 mg      1-01                               tablet by           ity of



     tablet      00:00:                               mouth in           [place]



                                                the            Medical



                                                  morning.           [facility]

 

     lisinopriL      2023-0      Yes       [id number] 2.5mg      Take 1           

[facility]



     2.5 mg      1-01                               tablet by           ity of



     tablet      00:00:                               mouth in           [place]



                                                the            Medical



                                                  morning.           [facility]

 

     lisinopriL      2023-0      Yes       [id number] 2.5mg      Take 1           

[facility]



     2.5 mg      1-01                               tablet by           ity of



     tablet      00:00:                               mouth in           [place]



                                                the            Medical



                                                  morning.           [facility]

 

     lisinopriL      2023-0      Yes       [id number] 2.5mg      Take 1           

[facility]



     2.5 mg      1-01                               tablet by           ity of



     tablet      00:00:                               mouth in           [place]



                                                the            Medical



                                                  morning.           [facility]

 

     lisinopriL      2023-0      Yes       [id number] 2.5mg      Take 1           

[facility]



     2.5 mg      1-01                               tablet by           ity of



     tablet      00:00:                               mouth in           [place]



                                                the            Medical



                                                  morning.           [facility]

 

     lisinopriL      2023-0      Yes       [id number] 2.5mg      Take 1           

[facility]



     2.5 mg      1-01                               tablet by           ity of



     tablet      00:00:                               mouth in           [place]



                                                the            Medical



                                                  morning.           [facility]

 

     lisinopriL      2023-0      Yes       [id number] 2.5mg      Take 1           

[facility]



     2.5 mg      1-01                               tablet by           ity of



     tablet      00:00:                               mouth in           [place]



               00                                 the            Medical



                                                  morning.           [facility]

 

     lisinopriL      2023-0      Yes       [id number] 2.5mg      Take 1           

[facility]



     2.5 mg      1-01                               tablet by           ity of



     tablet      00:00:                               mouth in           [place]



                                                the            Medical



                                                  morning.           [facility]

 

     lisinopriL      2023-0      Yes       [id number] 2.5mg      Take 1           

[facility]



     2.5 mg      1-01                               tablet by           ity of



     tablet      00:00:                               mouth in           [place]



                                                the            Medical



                                                  morning.           [facility]

 

     lisinopriL      2023-0      Yes       [id number] 2.5mg      Take 1           

[facility]



     2.5 mg      1-01                               tablet by           ity of



     tablet      00:00:                               mouth in           [place]



                                                the            Medical



                                                  morning.           [facility]

 

     lisinopriL      2023-0      Yes       [id number] 2.5mg      Take 1           

[facility]



     2.5 mg      1-01                               tablet by           ity of



     tablet      00:00:                               mouth in           [place]



                                                the            Medical



                                                  morning.           [facility]

 

     lisinopriL      2023-0      Yes       [id number] 2.5mg      Take 1           

[facility]



     2.5 mg      1-01                               tablet by           ity of



     tablet      00:00:                               mouth in           [place]



                                                the            Medical



                                                  morning.           [facility]

 

     lisinopriL      2023-0      Yes       [id number] 2.5mg      Take 1           

[facility]



     2.5 mg      1-01                               tablet by           ity of



     tablet      00:00:                               mouth in           [place]



                                                the            Medical



                                                  morning.           [facility]

 

     lisinopriL      2023-0      Yes       [id number] 2.5mg      Take 1           

[facility]



     2.5 mg      1-01                               tablet by           ity of



     tablet      00:00:                               mouth in           [place]



                                                the            Medical



                                                  morning.           [facility]

 

     lisinopriL      2023-0      Yes       [id number] 2.5mg      Take 1           

[facility]



     2.5 mg      1-01                               tablet by           ity of



     tablet      00:00:                               mouth in           [place]



                                                the            Medical



                                                  morning.           [facility]

 

     lisinopriL      2023-0      Yes       [id number] 2.5mg      Take 1           

[facility]



     2.5 mg      1-01                               tablet by           ity of



     tablet      00:00:                               mouth in           [place]



                                                the            Medical



                                                  morning.           [facility]

 

     lisinopriL      2023-0      Yes       [id number] 2.5mg      Take 1           

[facility]



     2.5 mg      1-01                               tablet by           ity of



     tablet      00:00:                               mouth in           [place]



                                                the            Medical



                                                  morning.           [facility]

 

     lisinopriL      2023-0      Yes       [id number] 2.5mg      Take 1           

[facility]



     2.5 mg      1-01                               tablet by           [facility]



     tablet      00:00:                               mouth in           [place]



                                                the            Medical



                                                  morning.           [facility]

 

     lisinopriL      2023-0      Yes       [id number] 2.5mg      Take 1           

[facility]



     2.5 mg      1-01                               tablet by           [facility]



     tablet      00:00:                               mouth in           [place]



                                                the            [facility]



                                                  morning.           [facility]

 

     lisinopriL      2023-0      Yes       [id number] 2.5mg      Take 1           

[facility]



     2.5 mg      1-01                               tablet by           [facility]



     tablet      00:00:                               mouth in           [place]



                                                the            [facility]



                                                  morning.           [facility]

 

     lisinopriL      2023-0      Yes       [id number] 2.5mg      Take 1           

[facility]



     2.5 mg      1-01                               tablet by           [facility]



     tablet      00:00:                               mouth in           [place]



                                                the            [facility]



                                                  morning.           [facility]

 

     lisinopriL      2023-0      Yes       [id number] 2.5mg      Take 1           

[facility]



     2.5 mg      1-01                               tablet by           [facility]



     tablet      00:00:                               mouth in           [place]



                                                the            [facility]



                                                  morning.           [facility]

 

     lisinopriL      2023-0      Yes       [id number] 2.5mg      Take 1           

[facility]



     2.5 mg      1-01                               tablet by           [facility]



     tablet      00:00:                               mouth in           [place]



                                                the            [facility]



                                                  morning.           [facility]

 

     lisinopriL      2023-0      Yes       [id number] 2.5mg      Take 1           

[facility]



     2.5 mg      1-01                               tablet by           [facility]



     tablet      00:00:                               mouth in           [place]



                                                the            [facility]



                                                  morning.           [facility]

 

     lisinopriL      2023-0      Yes       [id number] 2.5mg      Take 1           

[facility]



     2.5 mg      1-01                               tablet by           [facility]



     tablet      00:00:                               mouth in           [place]



                                                the            [facility]



                                                  morning.           [facility]

 

     lisinopriL      2023-0      Yes       [id number] 2.5mg      Take 1           

[facility]



     2.5 mg      1-01                               tablet by           [facility]



     tablet      00:00:                               mouth in           [place]



                                                the            [facility]



                                                  morning.           [facility]

 

     lisinopriL      2023-0      Yes       [id number] 2.5mg      Take 1           

[facility]



     2.5 mg      1-01                               tablet by           [facility]



     tablet      00:00:                               mouth in           [place]



                                                the            [facility]



                                                  morning.           [facility]

 

     lisinopriL      2023-0      Yes       [id number] 2.5mg      Take 1           

[facility]



     2.5 mg      1-01                               tablet by           [facility]



     tablet      00:00:                               mouth in           [place]



                                                the            [facility]



                                                  morning.           [facility]

 

     lisinopriL      2023-0      Yes       [id number] 2.5mg      Take 1           

[facility]



     2.5 mg      1-01                               tablet by           [facility]



     tablet      00:00:                               mouth in           [place]



               00                                 the            Medical



                                                  morning.           [facility]

 

     lisinopriL      3-0      Yes       [id number] 2.5mg      Take 1           

[facility]



     2.5 mg      1-01                               tablet by           [facility]



     tablet      00:00:                               mouth in           [place]



                                                the            [facility]



                                                  morning.           [facility]

 

     lisinopriL      3-0      Yes       [id number] 2.5mg      Take 1           

[facility]



     2.5 mg      1-01                               tablet by           [facility]



     tablet      00:00:                               mouth in           [place]



                                                the            [facility]



                                                  morning.           [facility]

 

     lisinopriL      3-0      Yes       [id number] 2.5mg      Take 1           

[facility]



     2.5 mg      1-01                               tablet by           [facility]



     tablet      00:00:                               mouth in           [place]



                                                the            [facility]



                                                  morning.           [facility]

 

     lisinopriL      3-0      Yes       [id number] 2.5mg      Take 1           

[facility]



     2.5 mg      1-01                               tablet by           [facility]



     tablet      00:00:                               mouth in           [place]



                                                the            [facility]



                                                  morning.           [facility]

 

     lisinopriL      3-0      Yes       [id number] 2.5mg      Take 1           

[facility]



     2.5 mg      1-01                               tablet by           [facility]



     tablet      00:00:                               mouth in           [place]



                                                the            [facility]



                                                  morning.           [facility]

 

     lisinopriL      3-0      Yes       [id number] 2.5mg      Take 1           

[facility]



     2.5 mg      1-01                               tablet by           [facility]



     tablet      00:00:                               mouth in           [place]



                                                the            [facility]



                                                  morning.           [facility]

 

     lisinopriL      3-0      Yes       [id number] 2.5mg      Take 1           

[facility]



     2.5 mg      1-01                               tablet by           [facility]



     tablet      00:00:                               mouth in           [place]



                                                the            [facility]



                                                  morning.           [facility]

 

     lisinopriL      3-0      Yes       [id number] 2.5mg      Take 1           

[facility]



     2.5 mg      1-01                               tablet by           [facility]



     tablet      00:00:                               mouth in           [place]



               00                                 the            Medical



                                                  morning.           [facility]

 

     lisinopriL      3-0      Yes       [id number] 2.5mg      Take 1           

[facility]



     2.5 mg      1-01                               tablet by           [facility]



     tablet      00:00:                               mouth in           [place]



               00                                 the            Medical



                                                  morning.           [facility]

 

     lisinopriL      3-0      Yes       [id number] 2.5mg      Take 1           

[facility]



     2.5 mg      1-01                               tablet by           [facility]



     tablet      00:00:                               mouth in           [place]



               00                                 the            Medical



                                                  morning.           [place]

 

     Nitrofurant      2022- No             100mg      100 mg,           [facility]

[facility]



     oin&Nit.      13                          Oral, BID,           ity 

of



     Macrocryst      02:00: 01:59                          10 doses,           [facility]

[place]



     (MACROBID)      00   :00                           First dose           Med

ical



     100 mg                                         on Mon           [facility]



     capsule 100                                         22 at           



     mg                                           2000, Last           



                                                  dose on           



                                                  Sat            



                                                  22           



                                                  at 0800,           



                                                  Routine<br           



                                                  >Reason           



                                                  for            



                                                  Anti-Infec           



                                                  tive:           



                                                  Empiric           



                                                  Therapy           



                                                  for            



                                                  Suspected           



                                                  Infection<           



                                                  br>Empiric           



                                                  Therapy           



                                                  Site:           



                                                  Urine<br>D           



                                                  uration of           



                                                  therapy:           



                                                  72 hours           

 

     ceFEPIme      2022 202- No             1000mg      1,000 mg,           [facility]

[facility]



     (MAXIPIME)                                IV             [facility]



     1,000 mg in      21:15: 21:34                          [place], [place]



     NaCl 0.9%      00   :48                           ONCE, 1           Medical



     (NS) 50 mL                                         dose, On           [facility]

h



     MINI-BAG                                         22 at           



                                                  1515,           



                                                  Administer           



                                                  over 30           



                                                  Minutes,           



                                                  50             



                                                  mL<br>Reas           



                                                  on for           



                                                  Anti-Infec           



                                                  tive:           



                                                  Documented           



                                                  Infection<           



                                                  br>Documen           



                                                  huma            



                                                  Infection           



                                                  Site:           



                                                  Urine<br&g           



                                                  t;Duration           



                                                  of             



                                                  Therapy: 7           



                                                  days           

 

     HYDROmorpho      2022      Yes            4mg       Take 4 mg           U

n[facility]



     ne 4 mg      1-07                               by mouth 3           [facility] of



     tablet      17:55:                               (three)           [place]



               40                                 times           Medical



                                                  daily as           [facility]



                                                  needed.           

 

     HYDROmorpho      2022      Yes            4mg       Take 4 mg           U

n[facility]



     ne 4 mg      1-07                               by mouth 3           [facility] of



     tablet      17:55:                               (three)           [place]



               40                                 times           Medical



                                                  daily as           [facility]



                                                  needed.           

 

     HYDROmorpho      2022      Yes            4mg       Take 4 mg           [facility]

n[facility]



     ne 4 mg      1-07                               by mouth 3           [facility]



     tablet      17:55:                               (three)           [place]



               40                                 times           Medical



                                                  daily as           [facility]



                                                  needed.           

 

     HYDROmorpho      2022      Yes            4mg       Take 4 mg           [facility]

n[facility]



     ne 4 mg      1-07                               by mouth 3           [facility]



     tablet      17:55:                               (three)           [place]



               40                                 times           Medical



                                                  daily as           [facility]



                                                  needed.           

 

     HYDROmorpho      2022      Yes            4mg       Take 4 mg           [facility]

n[facility]



     ne 4 mg      1-07                               by mouth 3           [facility] of



     tablet      17:55:                               (three)           [place]



               40                                 times           Medical



                                                  daily as           [facility]



                                                  needed.           

 

     HYDROmorpho      2022      Yes            4mg       Take 4 mg           [facility]

n[facility]



     ne 4 mg      1-07                               by mouth 3           [facility]



     tablet      17:55:                               (three)           [place]



               40                                 times           Medical



                                                  daily as           [facility]



                                                  needed.           

 

     HYDROmorpho      2022      Yes            4mg       Take 4 mg           [facility]

n[facility]



     ne 4 mg      1-07                               by mouth 3           [facility]



     tablet      17:55:                               (three)           [place]



               40                                 times           Medical



                                                  daily as           [facility]



                                                  needed.           

 

     HYDROmorpho      2022      Yes            4mg       Take 4 mg           U

n[facility]



     ne 4 mg      1-07                               by mouth 3           [facility] of



     tablet      17:55:                               (three)           [place]



               40                                 times           Medical



                                                  daily as           [facility]



                                                  needed.           

 

     HYDROmorpho      2022      Yes            4mg       Take 4 mg           [facility]

n[facility]



     ne 4 mg      1-07                               by mouth 3           [facility] of



     tablet      17:55:                               (three)           [place]



               40                                 times           Medical



                                                  daily as           [facility]



                                                  needed.           

 

     HYDROmorpho      2022      Yes            4mg       Take 4 mg           U

n[facility]



     ne 4 mg      1-07                               by mouth 3           ity of



     tablet      17:55:                               (three)           [place]



               40                                 times           Medical



                                                  daily as           [facility]



                                                  needed.           

 

     HYDROmorpho      2022      Yes            4mg       Take 4 mg           U

nivers



     ne 4 mg      1-07                               by mouth 3           ity of



     tablet      17:55:                               (three)           [place]



               40                                 times           Medical



                                                  daily as           [facility]



                                                  needed.           

 

     HYDROmorpho      2022      Yes            4mg       Take 4 mg           U

n[facility]



     ne 4 mg      1-07                               by mouth 3           ity of



     tablet      17:55:                               (three)           [place]



               40                                 times           Medical



                                                  daily as           [facility]



                                                  needed.           

 

     enoxaparin      2022      Yes            40mg      40 mg,           [facility]

rs



     (LOVENOX)                                     Subcutaneo           ity 

of



     injection      15:00:                               us, DAILY,           [facility]

[place]



     40 mg      00                                 First dose           Medical



                                                  on 22 at           



                                                  0900,           



                                                  Until           



                                                  Discontinu           



                                                  ed,            



                                                  Routine           

 

     Sliding      2022      Yes                      Subcutaneo           Un[facility]

ers



     Scale      -07                               us, TID           [facility]



     Insulin -      03:00:                               MEALS+HS,           [facility]

as



     Lispro      00                                 First dose           Medical



     (HumaLOG) +                                         on Sun           [facility]



     Fsbg                                         22 at           



     Testing                                         2100,           



                                                  Until           



                                                  Discontinu           



                                                  ed,            



                                                  Routine           

 

     FENTanyl PF      2022- No             75ug      75 mcg,           [facility]

[facility]



     (SUBLIMAZE                                Slow IV           ity o

f



     (PF))      01:15: 00:23                          Push,           [place]



     injection      00   :00                           ONCE, 1           Medical



     75 mcg                                         dose, On           [facility]



                                                  Sun            



                                                  22 at           



                                                  1915,           



                                                  Routine           

 

     dextrose      2022      Yes            250mL      250 mL, IV           [facility]

[facility]



     10% (D10W)      1-07                               Infusion,           ity 

of



     bolus      01:12:                               PRN - SEE           [place]



     infusion      23                                 INSTRUCTIO           Medic

al



     250 mL                                         NS,            [facility]



                                                  Administer           



                                                  over 60           



                                                  Minutes,           



                                                  Other, If           



                                                  blood           



                                                  glucose is           



                                                  &amp;lt;           



                                                  or = 70           



                                                  mg/dL and           



                                                  patient is           



                                                  unable to           



                                                  swallow or           



                                                  has mental           



                                                  status           



                                                  changes,           



                                                  Starting           



                                                  on Sun           



                                                  22 at           



                                                  1912<br>If           



                                                  blood           



                                                  glucose is           



                                                  < or = 70           



                                                  mg/dL and           



                                                  patient is           



                                                  unable to           



                                                  swallow or           



                                                  has mental           



                                                  status           



                                                  changes           



                                                  (Give           



                                                  glucagon           



                                                  order if           



                                                  patient           



                                                  needs           



                                                  fluid           



                                                  restrictio           



                                                  n):            



                                                  &nbsp;IF           



                                                  IV access           



                                                  available:           



                                                  Dextrose           



                                                  10%.           



                                                  &nbsp;1.           



                                                  125 mL (?           



                                                  bag) of           



                                                  D10W IV           



                                                  infusion -           



                                                  equivalent           



                                                  to 12.5 g           



                                                  dextrose           



                                                  &nbsp;2.           



                                                  Blood           



                                                  glucose -           



                                                  draw blood           



                                                  glucose 15           



                                                  minutes           



                                                  after D10W           



                                                  Administra           



                                                  tion.           



                                                  &amp;nbsp;           



                                                  3. If           



                                                  blood           



                                                  glucose is           



                                                  < 80           



                                                  mg/dL,           



                                                  repeat.<br           



                                                  >              

 

     glucagon      2022      Yes            1mg       1 mg,           [facility]



     (GLUCAGEN                                     Intramuscu           ity 

of



     DIAGNOSTIC      01:12:                               lar, PRN,           [facility]

[place]



     KIT)      20                                 Starting           Medical



     injection 1                                         on [facility]                                           22 at           



                                                  1912,           



                                                  Until           



                                                  Discontinu           



                                                  ed, ASAP,           



                                                  Blood           



                                                  Glucose           



                                                  &amp;lt;           



                                                  or = 70           



                                                  mg/dL and           



                                                  patient is           



                                                  unable to           



                                                  swallow or           



                                                  has mental           



                                                  changes.           

 

     HYDROmorpho      2022      Yes            4mg       4 mg,           Unive

rs



     ne                                       Oral,           ity of



     (DILAUDID)      01:08:                               Q6HPRNErica

s



     tablet 4 mg      42                                 Starting           Medi

sar[place]



                                                  on [facility]



                                                  22 at           



                                                  1908,           



                                                  Until           



                                                  Discontinu           



                                                  ed,            



                                                  Routine,           



                                                  Pain           



                                                  (scale           



                                                  7-10)           

 

     proMETHazin      2022      Yes            12.5mg      12.5 mg,           

[facility]



     e                                        Oral,           ity of



     (PHENERGAN)      01:06:                               Q4HPRN           Eric

as



     tablet 12.5      57                                 Starting           Medi

[facility]



     mg                                           on [facility]



                                                  22 at           



                                                  1906,           



                                                  Until           



                                                  Discontinu           



                                                  ed,            



                                                  Routine,           



                                                  Nausea and           



                                                  Vomiting           



                                                  (N/V)           

 

     acetaminoph      2022      Yes            650mg      650 mg,           Un

[facility]



     en                                       Oral,           ity of



     (TYLENOL)      01:05:                               Q6HPRN,           [place]



     tablet 650      57                                 Starting           Medic

al



     mg                                           on Sun           [facility]



                                                  22 at           



                                                  1905,           



                                                  Until           



                                                  Discontinu           



                                                  ed,            



                                                  Routine,           



                                                  Pain           



                                                  (scale           



                                                  1-3)           

 

     NaCl 0.9%      2022 No             1000mL      at 999           Uni

vers



     (NS) bolus      07                          mL/hr,           ity of



     infusion      00:30: 00:35                          1,000 mL,           Eric

as



     1,000 mL      00   :00                           IV             Medical



                                                  Infusion,           [facility]



                                                  ONCE, 1           



                                                  dose, On           



                                                  Sun            



                                                  22 at           



                                                  1830, STAT           

 

     Nitrofurant      2022- No        [id number] 100mg      Take 1         

  [facility]



     oin&Nit.      15                          capsule by           ity 

of



     Macrocryst      00:00: 05:59                          mouth in           Te

[facility]



     100 mg      00   :00                           the            Medical



     capsule                                         morning           [facility]anch



                                                  and 1           



                                                  capsule in           



                                                  the            



                                                  evening.           



                                                  Do all           



                                                  this for 7           



                                                  days.           

 

     NaCl 0.9%      2022 No             1000mL      at 999           Uni

vers



     (NS) bolus                                mL/hr,           ity of



     infusion      23:45: 00:36                          1,000 mL,           Eric

as



     1,000 mL      00   :00                           IV             Medical



                                                  Infusion,           [facility]



                                                  ONCE, 1           



                                                  dose, On           



                                                  Sun            



                                                  22 at           



                                                  1745, ASAP           

 

     FENTanyl PF      2022 No             75ug      75 mcg,           Un

[facility]



     (SUBLIMAZE      06                          Slow IV           ity o

f



     (PF))      23:45: 23:19                          Push,           [place]



     injection      00   :00                           ONCE, 1           Medical



     75 mcg                                         dose, On           [facility]



                                                  Sun            



                                                  22 at           



                                                  1745,           



                                                  Routine           

 

     iopamidol      2022- No        [id number] 85mL      85 mL,           

[facility]



     (ISOVUE      0-30 10-30                          Intravenou           ity o

f



     370-500 mL)      03:00: 02:09                          s, ONCE, 1          

 [place]



     injection      00   :00                           dose, On           Medica

l



     85 mL                                         Sat            Branch



                                                  10/29/22           



                                                  at 2200,           



                                                  Routine           

 

     FENTanyl PF      2022 No             50ug      50 mcg,           Un

[facility]



     (SUBLIMAZE      0-30 10-30                          Slow IV           ity o

f



     (PF))      02:45: 01:47                          Push,           [place]



     injection      00   :00                           ONCE, 1           Medical



     50 mcg                                         dose, On           [facility]anch



                                                  Sat            



                                                  10/29/22           



                                                  at 2145,           



                                                  Routine           

 

     cefdinir      2022 No             300mg      300 mg,           Univ

ers



     (OMNICEF)      0-30 10-30                          Oral,           ity of



     capsule 300      02:15: 03:14                          ONCE, 1           [facility]

[place]



     mg        00   :00                           dose, On           Medical



                                                  Sat            Branch



                                                  10/29/22           



                                                  at 2115,           



                                                  ASAP<br>Re           



                                                  ason for           



                                                  Anti-Infec           



                                                  tive:           



                                                  Documented           



                                                  Infection<           



                                                  br>Documen           



                                                  huma            



                                                  Infection           



                                                  Site:           



                                                  Urine<br>D           



                                                  uration of           



                                                  Therapy:           



                                                  Other (see           



                                                  Comments)           

 

     proMETHazin      2022- No             25mg      25 mg, IV           

[facility]



     e         0-30 10-30                          Piggyback,           ity of



     (PHENERGAN)      01:45: 01:47                          ONCE, 1           Te

[place]



     25 mg in      00   :00                           dose, On           Medical



     NaCl 0.9%                                         Sat            Branch



     (NS) 50 mL                                         10/29/22           



     IV                                           at 2045,           



     piggyback                                         ASAP           

 

     cefdinir      2022      Yes       [id number] 300mg      Take 1           Un

[facility]



     300 mg      0-29                               capsule by           ity of



     capsule      00:00:                               mouth           [place]



               00                                 every 12           Medical



                                                  (twelve)           [facility]



                                                  hours.           

 

     cefdinir      2022      Yes       [id number] 300mg      Take 1           Un

[facility]



     300 mg      0-29                               capsule by           ity of



     capsule      00:00:                               mouth           [place]



               00                                 every 12           Medical



                                                  (twelve)           [facility]



                                                  hours.           

 

     cefdinir      2022- No        [id number] 300mg      Take 1           U

nivers



     300 mg      0-29 11-07                          capsule by           ity of



     capsule      00:00: 00:00                          mouth           [place]



               00   :00                           every 12           Medical



                                                  (twelve)           [facility]anch



                                                  hours.           

 

     HYDROmorpho      2022      Yes            4mg       Take 4 mg           U

nivers



     ne 4 mg      0-23                               by mouth 3           ity of



     tablet      12:52:                               (three)           [place]



               28                                 times           Medical



                                                  daily as           Branch



                                                  needed.           

 

     HYDROmorpho      2022      Yes            4mg       Take 4 mg           U

nivers



     ne 4 mg      0-23                               by mouth 3           ity of



     tablet      12:52:                               (three)           [place]



               28                                 times           Medical



                                                  daily as           [facility]



                                                  needed.           

 

     HYDROmorpho      2022      Yes            4mg       Take 4 mg           U

nivers



     ne 4 mg      0-23                               by mouth 3           ity of



     tablet      12:52:                               (three)           [place]



               28                                 times           Medical



                                                  daily as           [facility]anch



                                                  needed.           

 

     HYDROmorpho      2022      Yes            4mg       Take 4 mg           U

nivers



     ne 4 mg      0-23                               by mouth 3           ity of



     tablet      12:52:                               (three)           [place]



               28                                 times           Medical



                                                  daily as           [facility]anch



                                                  needed.           

 

     HYDROmorpho      2022- No             .5mg      0.5 mg,           Un

[facility]



     ne        0-23 10-23                          Slow IV           ity of



     (DILAUDID)      03:15: 03:23                          Push,           [place]



     injection      00   :00                           ONCE, 1           Medical



     0.5 mg                                         dose, On           [facility]



                                                  Sat            



                                                  10/22/22           



                                                  at 2215,           



                                                  Routine<br           



                                                  >Use           



                                                  approved           



                                                  by             



                                                  (Faculty):           



                                                  [facility]            



                                                  PROVIDER           

 

     HYDROmorpho      2022- No             .5mg      0.5 mg,           Un

[place]



     ne        0-22 10-22                          Slow IV           ity of



     (DILAUDID)      02:00: 02:00                          Push,           [place]



     injection      00   :00                           ONCE, 1           Medical



     0.5 mg                                         dose, On           [facility]



                                                  Fri            



                                                  10/21/22           



                                                  at 2100,           



                                                  Routine<br           



                                                  >Use           



                                                  approved           



                                                  by             



                                                  (Faculty):           



                                                  [facility]            



                                                  PROVIDER           

 

     doxycycline      2022 No             100mg      100 mg, IV         

  [facility]



     (VIBRAMYCIN      0-21 10-22                          Piggyback,           i

ty of



     ) 100 mg in      23:30: 20:05                          Q12H ABX,           

[place]



     NaCl 0.9%      00   :47                           10 doses,           Medic

al



     (NS) 100 mL                                         First dose           Br

anch



     MINI-BAG                                         on Fri           



                                                  10/21/22           



                                                  at 1830,           



                                                  Last dose           



                                                  on Wed           



                                                  10/26/22           



                                                  at 0630,           



                                                  Administer           



                                                  over 60           



                                                  Minutes,           



                                                  100            



                                                  mL<br>Reas           



                                                  on for           



                                                  Anti-Infec           



                                                  tive:           



                                                  [facility]           



                                                  Therapy           



                                                  for            



                                                  Suspected           



                                                  Infection<           



                                                  br>[facility]           



                                                  Therapy           



                                                  Site: Skin           



                                                  / Soft           



                                                  tissue<br>           



                                                  Duration           



                                                  of             



                                                  therapy:           



                                                  72 hours           

 

     enoxaparin      2022      Yes            40mg      40 mg,           Unive

rs



     (LOVENOX)      0-                               Subcutaneo           ity 

of



     injection      22:00:                               us, DAILY,           Te

[place]



     40 mg      00                                 First dose           Medical



                                                  on Fri           Branch



                                                  10/21/22           



                                                  at 1700,           



                                                  Until           



                                                  Discontinu           



                                                  ed,            



                                                  Routine           

 

     ceFEPIme      2022- No             2000mg      2,000 mg,           U

nivers



     (MAXIPIME)      0-21 10-26                          IV             ity of



     2,000 mg in      21:00: 20:59                          Piggyback,          

 [place]



     NaCl 0.9%      00   :00                           Q8H ABX,           Medica

l



     (NS) 50 mL                                         15 doses,           [facility]





     MINI-BAG                                         First dose           



                                                  on Fri           



                                                  10/21/22           



                                                  at 1600,           



                                                  Last dose           



                                                  on Wed           



                                                  10/26/22           



                                                  at 0800,           



                                                  Administer           



                                                  over 4           



                                                  Hours, 50           



                                                  mL<br>Reas           



                                                  on for           



                                                  Anti-Infec           



                                                  tive:           



                                                  [facility]           



                                                  Therapy           



                                                  for            



                                                  Suspected           



                                                  Infection<           



                                                  br>[facility]           



                                                  Therapy           



                                                  Site:           



                                                  Abdominal<           



                                                  br>Duratio           



                                                  n of           



                                                  therapy: 5           



                                                  days           

 

     HYDROmorpho      2022      Yes            4mg       4 mg,           Unive

rs



     ne        0-21                               Oral,           ity of



     (DILAUDID)      19:38:                               Q4HPRN,           [place]

[place]



     tablet 4 mg      15                                 Starting           Medi

sarah



                                                  on Fri           Branch



                                                  10/21/22           



                                                  at 1438,           



                                                  Until           



                                                  Discontinu           



                                                  ed,            



                                                  Routine,           



                                                  Pain           



                                                  (scale           



                                                  7-10)           

 

     sodium      2022      Yes                      Topical,           [facility]



     hypochlorit      0-21                               BID, First           it

y of



     e 0.125 %      18:30:                               dose on           [place]



     (DAKIN'S                  Medical



     SOLUTION)                                         10/21/22           [place]



     solution                                         at 1330,           



                                                  Until           



                                                  Discontinu           



                                                  ed,            



                                                  Routine           

 

     ceFEPIme      2022- No             2000mg      2,000 mg,           U

nivers



     (MAXIPIME)      0-21 10-21                          IV             ity of



     2,000 mg in      13:15: 14:55                          [facility],          

 [place]



     NaCl 0.9%      00   :00                           ONCE, 1           Medical



     (NS) 50 mL                                         dose, On           [facility]

h



     MINI-BAG                                         Fri            



                                                  10/21/22           



                                                  at 0815,           



                                                  Administer           



                                                  over 30           



                                                  Minutes,           



                                                  50             



                                                  mL<br>Reas           



                                                  on for           



                                                  Anti-Infec           



                                                  tive:           



                                                  Empiric           



                                                  Therapy           



                                                  for            



                                                  Suspected           



                                                  Infection<           



                                                  br>Empiric           



                                                  Therapy           



                                                  Site:           



                                                  Urine<br>D           



                                                  uration of           



                                                  therapy: 5           



                                                  days           

 

     Sliding      2022      Yes                      Subcutaneo           Univ

ers



     Scale      0-21                               us, AC+HS,           ity of



     Insulin-Reg      12:30:                               First dose           

[place]



     ular + Fsbg      00                                 on Fri           Medica

l



     Testing                                         10/21/22           Branch



                                                  at 0730,           



                                                  Until           



                                                  Discontinu           



                                                  ed,            



                                                  Routine           

 

     acetaminoph      2022      Yes            650mg      650 mg,           Un

[facility]



     en        0-21                               Oral,           ity of



     (TYLENOL)      12:08:                               Q6HPRN,           [place]



     tablet 650      12                                 Starting           Medic

al



     mg                                           on Fri           Branch



                                                  10/21/22           



                                                  at 0708,           



                                                  Until           



                                                  Discontinu           



                                                  ed,            



                                                  Routine,           



                                                  Pain           



                                                  (scale           



                                                  1-3)           

 

     HYDROmorpho      2022- No             .5mg      0.5 mg,           Un

[facility]



     ne        0-21 10-                          Slow IV           ity of



     (DILAUDID)      12:07: 16:23                          Push,           [place]



     injection      21   :00                           Q4HPRN, 2           Medic

al



     0.5 mg                                         doses,           Branch



                                                  Starting           



                                                  on Fri           



                                                  10/21/22           



                                                  at 0707,           



                                                  Until           



                                                  Discontinu           



                                                  ed,            



                                                  Routine,           



                                                  Pain           



                                                  (scale           



                                                  7-10)<br>U           



                                                  se             



                                                  approved           



                                                  by             



                                                  (Faculty):           



                                                  ADC            



                                                  PROVIDER           

 

     proMETHazin      2022      Yes            12.5mg      12.5 mg,           

Univers



     e         0-                               IV             ity of



     (PHENERGAN)      12:07:                               Piggyback,           

[place]



     12.5 mg in      04                                 Q4HPRN,           Medica

l



     NaCl 0.9%                                         Starting           Branch



     (NS) 50 mL                                         on Fri           



     IV                                           10/21/22           



     piggyback                                         at 0707,           



                                                  Until           



                                                  Discontinu           



                                                  ed,            



                                                  Routine,           



                                                  Nausea and           



                                                  Vomiting           



                                                  (N/V)           

 

     dextrose      2022      Yes            250mL      250 mL, IV           Un

[facility]



     10% (D10W)      0-                               Infusion,           ity 

of



     bolus      11:02:                               PRN - SEE           [place]



     infusion      05                                 INSTRUCTIO           Medic

al



     250 mL                                         NS,            Branch



                                                  Administer           



                                                  over 60           



                                                  Minutes,           



                                                  Other, If           



                                                  blood           



                                                  glucose is           



                                                  &amp;lt;           



                                                  or = 70           



                                                  mg/dL and           



                                                  patient is           



                                                  unable to           



                                                  swallow or           



                                                  has mental           



                                                  status           



                                                  changes,           



                                                  Starting           



                                                  on Fri           



                                                  10/21/22           



                                                  at             



                                                  0602<br>If           



                                                  blood           



                                                  glucose is           



                                                  < or = 70           



                                                  mg/dL and           



                                                  patient is           



                                                  unable to           



                                                  swallow or           



                                                  has mental           



                                                  status           



                                                  changes           



                                                  (Give           



                                                  glucagon           



                                                  order if           



                                                  patient           



                                                  needs           



                                                  fluid           



                                                  restrictio           



                                                  n):            



                                                  &nbsp;IF           



                                                  IV access           



                                                  available:           



                                                  Dextrose           



                                                  10%.           



                                                  &nbsp;1.           



                                                  125 mL (?           



                                                  bag) of           



                                                  D10W IV           



                                                  infusion -           



                                                  equivalent           



                                                  to 12.5 g           



                                                  dextrose           



                                                  &nbsp;2.           



                                                  Blood           



                                                  glucose -           



                                                  draw blood           



                                                  glucose 15           



                                                  minutes           



                                                  after D10W           



                                                  Administra           



                                                  tion.           



                                                  &amp;nbsp;           



                                                  3. If           



                                                  blood           



                                                  glucose is           



                                                  < 80           



                                                  mg/dL,           



                                                  repeat.<br           



                                                  >              

 

     acetaminoph      2022      Yes            650mg      650 mg,           Un

[place]



     en        0-21                               Oral,           ity of



     (TYLENOL)      11:01:                               Q6HPRN,           [place]



     tablet 650      26                                 Starting           Medic

al



     mg                                           on Fri           Branch



                                                  10/21/22           



                                                  at 0601,           



                                                  Until           



                                                  Discontinu           



                                                  ed,            



                                                  Routine,           



                                                  Pain           



                                                  (scale           



                                                  1-3)           

 

     iopamidol      2022- No        [id number] 100mL      100 mL,          

 [facility]



     (ISOVUE      0-21 10-21                          Intravenou           ity o

f



     370-500 mL)      08:00: 08:00                          s, ONCE, 1          

 [place]



     injection      00   :00                           dose, On           Medica

l



     100 mL                                         Fri            Branch



                                                  10/21/22           



                                                  at 0300,           



                                                  Routine           

 

     ceFEPIme      2022- No             1000mg      1,000 mg,           U

nivers



     (MAXIPIME)      0-21 10-21                          IV             ity of



     injection      07:15: 07:36                          Piggyback,           [place]

[place]



     1,000 mg      00   :00                           ONCE, 1           Medical



                                                  dose, On           Branch



                                                  Fri            



                                                  10/21/22           



                                                  at 0215,           



                                                  STAT<br>Re           



                                                  ason for           



                                                  Anti-Infec           



                                                  tive:           



                                                  Documented           



                                                  Infection<           



                                                  br>Documen           



                                                  huma            



                                                  Infection           



                                                  Site: Skin           



                                                  / Soft           



                                                  Tissue<br>           



                                                  Duration           



                                                  of             



                                                  Therapy:           



                                                  Other (see           



                                                  Comments)           

 

     FENTanyl PF      2022- No             50ug      50 mcg,           Un

[place]



     (SUBLIMAZE      0-21 10-21                          Slow IV           ity o

f



     (PF))      06:30: 06:12                          Push,           [place]



     injection      00   :00                           ONCE, 1           Medical



     50 mcg                                         dose, On           Branch



                                                  Fri            



                                                  10/21/22           



                                                  at 0130,           



                                                  ASAP           

 

     proMETHazin      2022- No             12.5mg      12.5 mg,          

 [facility]



     e         0-21 10-21                          IV             ity of



     (PHENERGAN)      06:15: 06:12                          Piggyback,          

 [place]



     12.5 mg in      00   :00                           ONCE, 1           Medica

l



     NaCl 0.9%                                         dose, On           Branch



     (NS) 50 mL                                         Fri            



     IV                                           10/21/22           



     piggyback                                         at 0115,           



                                                  ASAP           

 

     insulin            Yes            26U       26 Units,           Unive

rs



     glargine      8-14                               Subcutaneo           ity o

f



     (LANTUS      02:00:                               us, [facility],           [place]



     U-100)      00                                 First dose           Medical



     injection                                         (after           Branch



     26 Units                                         last           



                                                  modificati           



                                                  on) on Sat           



                                                  22 at           



                                                  2100,           



                                                  Until           



                                                  Discontinu           



                                                  ed,            



                                                  Routine           

 

     insulin      2022- No             24U       24 Units,           Univ

ers



     glargine       08-12                          Subcutaneo           ity 

of



     (LANTUS      20:38: 20:43                          us, ONCE,           [facility]

s



     U-100)      00   :00                           1 dose, On           Medical



     injection                                         Fri            Branch



     24 Units                                         22 at           



                                                  1545, ASAP           

 

     sennosides-            Yes            1{tbl}      1 tablet,          

 [facility]



     docusate                                     Oral,           ity of



     sodium      14:00:                               DAILY,           [place]



     (SENOKOT-S)      00                                 First dose           Me

dical



     8.6-50 mg                                         on Fri           Branch



     per tablet                                         22 at           



     1 tablet                                         0900,           



                                                  Until           



                                                  Discontinu           



                                                  ed,            



                                                  Routine           

 

     polyethylen            Yes            17g       17 g,           Unive

rs



     e glycol                                     Oral,           ity of



     3350 powder      14:00:                               DAILY,           [place]

s



     17 g      00                                 First dose           Medical



                                                  on Fri           Branch



                                                  22 at           



                                                  0900,           



                                                  Until           



                                                  Discontinu           



                                                  ed,            



                                                  Routine           

 

     insulin NPH      2022- No             16U       16 Units,           

[facility]



     (HUMULIN N)                                Subcutaneo           i

ty of



     injection      14:00: 17:23                          us,            [place]



     16 Units      00   :36                           QAM+HS,           Medical



                                                  First dose           Branch



                                                  (after           



                                                  last           



                                                  modificati           



                                                  on) on 22 at           



                                                  0900,           



                                                  Until           



                                                  Discontinu           



                                                  ed,            



                                                  Routine           

 

     Sliding            Yes                      Subcutaneo           Univ

ers



     Scale                                     us, TID           ity of



     Insulin -      13:00:                               MEALS+HS,           Eric

as



     Lispro      00                                 First dose           Medical



     (HumaLOG) +                                         (after           Branch



     Fsbg                                         last           



     Testing                                         modificati           



                                                  on) on 22 at           



                                                  0800,           



                                                  Until           



                                                  Discontinu           



                                                  ed,            



                                                  Routine           

 

     insulin NPH      2022- No             8U        8 Units,           U

nivers



     (HUMULIN N)                                Subcutaneo           i

ty of



     injection 8      02:00: 12:34                          us, QHS,           T

[facility]



     Units      00   :51                           First dose           Medical



                                                  (after           [facility]



                                                  last           



                                                  modificati           



                                                  on) on u           



                                                  22 at           



                                                  2100,           



                                                  Until           



                                                  Discontinu           



                                                  ed,            



                                                  Routine           

 

     repaglinide            Yes       [id number] .5mg      Take 1          

 [facility]



     0.5 mg                                     tablet by           ity of



     tablet      00:00:                               mouth in           [place]



               00                                 the            Medical



                                                  morning           [facility]



                                                  and 1           



                                                  tablet at           



                                                  noon and 1           



                                                  tablet in           



                                                  the            



                                                  evening.           



                                                  Take           



                                                  before           



                                                  meals.           

 

     sodium            Yes       [id number]           Apply to           

[facility]



     hypochlorit                                     area(s)           ity o

f



     e 0.25%      00:00:                               daily.           [place]



     solution      00                                                Medical



                                                                 [facility]

 

     repaglinide      2022-0      Yes       [id number] .5mg      Take 1          

 [facility]



     0.5 mg      8-12                               tablet by           ity of



     tablet      00:00:                               mouth in           [place]



               00                                 the            Medical



                                                  morning           [place]



                                                  and 1           



                                                  tablet at           



                                                  noon and 1           



                                                  tablet in           



                                                  the            



                                                  evening.           



                                                  Take           



                                                  before           



                                                  meals.           

 

     sodium      2022-0      Yes       [id number]           Apply to           Un

[facility]



     hypochlorit      8-12                               area(s)           ity o

f



     e 0.25%      00:00:                               daily.           [place]



     solution      00                                                Medical



                                                                 [facility]

 

     repaglinide      2022-0      Yes       [id number] .5mg      Take 1          

 [facility]



     0.5 mg      8-12                               tablet by           ity of



     tablet      00:00:                               mouth in           [place]



               00                                 the            Medical



                                                  morning           [place]



                                                  and 1           



                                                  tablet at           



                                                  noon and 1           



                                                  tablet in           



                                                  the            



                                                  evening.           



                                                  Take           



                                                  before           



                                                  meals.           

 

     sodium      2022-0      Yes       [id number]           Apply to           Un

[facility]



     hypochlorit      8-12                               area(s)           ity o

f



     e 0.25%      00:00:                               daily.           [address]



                                                                 [facility]

 

     repaglinide      2022-0      Yes       [id number] .5mg      Take 1          

 [facility]



     0.5 mg      8-12                               tablet by           ity of



     tablet      00:00:                               mouth in           [place]



               00                                 the            Medical



                                                  morning           [place]



                                                  and 1           



                                                  tablet at           



                                                  noon and 1           



                                                  tablet in           



                                                  the            



                                                  evening.           



                                                  Take           



                                                  before           



                                                  meals.           

 

     sodium      2022-0      Yes       [id number]           Apply to           Un

[facility]



     hypochlorit      8-12                               area(s)           ity o

f



     e 0.25%      00:00:                               daily.           [address]



                                                                 [facility]

 

     repaglinide      2022-0      Yes       [id number] .5mg      Take 1          

 [facility]



     0.5 mg      8-12                               tablet by           ity of



     tablet      00:00:                               mouth in           [place]



               00                                 the            Medical



                                                  morning           [place]



                                                  and 1           



                                                  tablet at           



                                                  noon and 1           



                                                  tablet in           



                                                  the            



                                                  evening.           



                                                  Take           



                                                  before           



                                                  meals.           

 

     sodium      2022-0      Yes       [id number]           Apply to           Un

[facility]



     hypochlorit      8-12                               area(s)           ity o

f



     e 0.25%      00:00:                               daily.           [place]



     solution      00                                                [facility]

 

     repaglinide      2022-0      Yes       [id number] .5mg      Take 1          

 [facility]



     0.5 mg      8-12                               tablet by           ity of



     tablet      00:00:                               mouth in           [address]



                                                  morning           [place]



                                                  and 1           



                                                  tablet at           



                                                  noon and 1           



                                                  tablet in           



                                                  the            



                                                  evening.           



                                                  Take           



                                                  before           



                                                  meals.           

 

     sodium      2022-0      Yes       [id number]           Apply to           Un

[facility]



     hypochlorit      8-12                               area(s)           ity o

f



     e 0.25%      00:00:                               daily.           [place]



     solution      00                                                Medical



                                                                 [facility]

 

     repaglinide      2022-0      Yes       [id number] .5mg      Take 1          

 [facility]



     0.5 mg      8-12                               tablet by           ity of



     tablet      00:00:                               mouth in           [place]



               00                                 the            Medical



                                                  morning           [facility]



                                                  and 1           



                                                  tablet at           



                                                  noon and 1           



                                                  tablet in           



                                                  the            



                                                  evening.           



                                                  Take           



                                                  before           



                                                  meals.           

 

     sodium      2022-0      Yes       [id number]           Apply to           Un

[facility]



     hypochlorit      8-12                               area(s)           ity o

f



     e 0.25%      00:00:                               daily.           [place]



     solution      00                                                Medical



                                                                 [facility]

 

     repaglinide      2022-0      Yes       [id number] .5mg      Take 1          

 [facility]



     0.5 mg      8-12                               tablet by           ity of



     tablet      00:00:                               mouth in           [place]



               00                                 the            Medical



                                                  morning           [place]



                                                  and 1           



                                                  tablet at           



                                                  noon and 1           



                                                  tablet in           



                                                  the            



                                                  evening.           



                                                  Take           



                                                  before           



                                                  meals.           

 

     sodium      2-0      Yes       [id number]           Apply to           Un

[facility]



     hypochlorit      8-12                               area(s)           ity o

f



     e 0.25%      00:00:                               daily.           [place]



     solution      00                                                Medical



                                                                 [facility]

 

     repaglinide      2-0      Yes       [id number] .5mg      Take 1          

 [facility]



     0.5 mg      8-12                               tablet by           ity of



     tablet      00:00:                               mouth in           [place]



               00                                 the            Medical



                                                  morning           [place]



                                                  and 1           



                                                  tablet at           



                                                  noon and 1           



                                                  tablet in           



                                                  the            



                                                  evening.           



                                                  Take           



                                                  before           



                                                  meals.           

 

     sodium      2-0      Yes       [id number]           Apply to           Un

[facility]



     hypochlorit      8-12                               area(s)           ity o

f



     e 0.25%      00:00:                               daily.           [place]



     solution      00                                                Medical



                                                                 [facility]

 

     repaglinide      2022-0      Yes       [id number] .5mg      Take 1          

 [facility]



     0.5 mg      8-12                               tablet by           ity of



     tablet      00:00:                               mouth in           [place]



               00                                 the            Medical



                                                  morning           [place]



                                                  and 1           



                                                  tablet at           



                                                  noon and 1           



                                                  tablet in           



                                                  the            



                                                  evening.           



                                                  Take           



                                                  before           



                                                  meals.           

 

     sodium      2022-0      Yes       [id number]           Apply to           Un

[facility]



     hypochlorit      8-12                               area(s)           ity o

f



     e 0.25%      00:00:                               daily.           [place]



     solution      00                                                Medical



                                                                 [facility]

 

     repaglinide      2022-0      Yes       [id number] .5mg      Take 1          

 [facility]



     0.5 mg      8-12                               tablet by           ity of



     tablet      00:00:                               mouth in           [address]



                                                  morning           [place]



                                                  and 1           



                                                  tablet at           



                                                  noon and 1           



                                                  tablet in           



                                                  the            



                                                  evening.           



                                                  Take           



                                                  before           



                                                  meals.           

 

     sodium      2022-0      Yes       [id number]           Apply to           Un

[facility]



     hypochlorit      8-12                               area(s)           ity o

f



     e 0.25%      00:00:                               daily.           [place]



     solution                                                      [facility]

 

     repaglinide      2-0      Yes       [id number] .5mg      Take 1          

 [facility]



     0.5 mg      8-12                               tablet by           ity of



     tablet      00:00:                               mouth in           [address]



                                                  and 1           



                                                  tablet at           



                                                  noon and 1           



                                                  tablet in           



                                                  the            



                                                  evening.           



                                                  Take           



                                                  before           



                                                  meals.           

 

     sodium      2-0      Yes       [id number]           Apply to           Un

[facility]



     hypochlorit      8-12                               area(s)           ity o

f



     e 0.25%      00:00:                               daily.           [place]



     solution      00                                                [facility]

 

     sodium      2022-0      Yes       [id number]           Apply to           Un

[facility]



     hypochlorit      8-12                               area(s)           ity o

f



     e 0.25%      00:00:                               daily.           [place]



     solution                                                      Medical



                                                                 [facility]

 

     sodium      2-0      Yes       [id number]           Apply to           Un

[facility]



     hypochlorit      8-12                               area(s)           ity o

f



     e 0.25%      00:00:                               daily.           [place]



     solution      00                                                Medical



                                                                 [facility]

 

     sodium      2022-0      Yes       [id number]           Apply to           Un

[facility]



     hypochlorit      8-12                               area(s)           ity o

f



     e 0.25%      00:00:                               daily.           [place]



     solution      00                                                [facility]

 

     sodium      2022-0      Yes       [id number]           Apply to           Un

[facility]



     hypochlorit      8-12                               area(s)           ity o

f



     e 0.25%      00:00:                               daily.           [place]



     solution      00                                                Medical



                                                                 [facility]

 

     sodium      2022-0      Yes       [id number]           Apply to           Un

[facility]



     hypochlorit      8-12                               area(s)           ity o

f



     e 0.25%      00:00:                               daily.           [place]



     solution      00                                                Medical



                                                                 [facility]

 

     sodium      2022-0      Yes       [id number]           Apply to           Un

[facility]



     hypochlorit      8-12                               area(s)           ity o

f



     e 0.25%      00:00:                               daily.           [place]



     solution      00                                                Medical



                                                                 [place]

 

     sodium      2022-0      Yes       [id number]           Apply to           Un

[facility]



     hypochlorit      8-12                               area(s)           ity o

f



     e 0.25%      00:00:                               daily.           [place]



     solution      00                                                Medical



                                                                 [facility]

 

     sodium      2022-0      Yes       [id number]           Apply to           Un

[facility]



     hypochlorit      8-12                               area(s)           ity o

f



     e 0.25%      00:00:                               daily.           [place]



     solution      00                                                Medical



                                                                 [facility]

 

     sodium      2022-0      Yes       [id number]           Apply to           Un

[facility]



     hypochlorit      8-12                               area(s)           ity o

f



     e 0.25%      00:00:                               daily.           [place]



     solution      00                                                Medical



                                                                 [facility]

 

     sodium      2022-0      Yes       [id number]           Apply to           Un

[facility]



     hypochlorit      8-12                               area(s)           ity o

f



     e 0.25%      00:00:                               daily.           [place]



     solution      00                                                Medical



                                                                 [facility]

 

     sodium      2022-0      Yes       [id number]           Apply to           Un

[facility]



     hypochlorit      8-12                               area(s)           ity o

f



     e 0.25%      00:00:                               daily.           [place]



     solution      00                                                Medical



                                                                 [facility]

 

     sodium      2022-0      Yes       [id number]           Apply to           Un

[facility]



     hypochlorit      8-12                               area(s)           ity o

f



     e 0.25%      00:00:                               daily.           [place]



     solution      00                                                Medical



                                                                 [facility]

 

     sodium      2022-0      Yes       [id number]           Apply to           Un

[facility]



     hypochlorit      8-12                               area(s)           ity o

f



     e 0.25%      00:00:                               daily.           [place]



     solution      00                                                Medical



                                                                 [facility]

 

     sodium      2022-0      Yes       [id number]           Apply to           Un

[facility]



     hypochlorit      8-12                               area(s)           ity o

f



     e 0.25%      00:00:                               daily.           [place]



     solution      00                                                Medical



                                                                 [facility]

 

     sodium      2022-0      Yes       [id number]           Apply to           Un

[facility]



     hypochlorit      8-12                               area(s)           ity o

f



     e 0.25%      00:00:                               daily.           [place]



     solution      00                                                Medical



                                                                 [facility]

 

     sodium      2022-0      Yes       [id number]           Apply to           [facility]

[facility]



     hypochlorit      8-12                               area(s)           ity o

f



     e 0.25%      00:00:                               daily.           [place]



     solution      00                                                Medical



                                                                 Br[facility]

 

     sodium      2022-0      Yes       [id number]           Apply to           Un

[facility]



     hypochlorit      8-12                               area(s)           ity o

f



     e 0.25%      00:00:                               daily.           [place]



     solution      00                                                Medical



                                                                 Branch

 

     sodium      2022-0      Yes       [id number]           Apply to           Un

[facility]



     hypochlorit      8-12                               area(s)           ity o

f



     e 0.25%      00:00:                               daily.           [place]



     solution      00                                                Medical



                                                                 Br[facility]

 

     sodium      2022-0      Yes       [id number]           Apply to           Un

[facility]



     hypochlorit      8-12                               area(s)           ity o

f



     e 0.25%      00:00:                               daily.           [place]



     solution      00                                                Medical



                                                                 Branch

 

     sodium      2022-0      Yes       [id number]           Apply to           Un

[facility]



     hypochlorit      8-12                               area(s)           ity o

f



     e 0.25%      00:00:                               daily.           [place]



     solution      00                                                Medical



                                                                 Branch

 

     sodium      2022-0      Yes       [id number]           Apply to           Un

[facility]



     hypochlorit      8-12                               area(s)           ity o

f



     e 0.25%      00:00:                               daily.           [place]



     solution      00                                                Medical



                                                                 Br[facility]

 

     sodium      2022-0      Yes       [id number]           Apply to           Un

[facility]



     hypochlorit      8-12                               area(s)           ity o

f



     e 0.25%      00:00:                               daily.           [place]



     solution      00                                                Medical



                                                                 Br[facility]

 

     sodium      2022-0      Yes       [id number]           Apply to           Un

[facility]



     hypochlorit      8-12                               area(s)           ity o

f



     e 0.25%      00:00:                               daily.           [place]



     solution      00                                                Medical



                                                                 Br[facility]

 

     sodium      2022-0      Yes       [id number]           Apply to           Un

[facility]



     hypochlorit      8-12                               area(s)           ity o

f



     e 0.25%      00:00:                               daily.           [place]



     solution      00                                                Medical



                                                                 Br[facility]

 

     sodium      2022-0      Yes       [id number]           Apply to           Un

[facility]



     hypochlorit      8-12                               area(s)           ity o

f



     e 0.25%      00:00:                               daily.           [place]



     solution      00                                                Medical



                                                                 [facility]

 

     sodium      2022-0      Yes       [id number]           Apply to           

[facility]



     hypochlorit      8-12                105            area(s)           ity o

f



     e 0.25%      00:00:                               daily.           [place]



     solution      00                                                Medical



                                                                 [facility]

 

     sodium      2022-0      Yes       [id number]           Apply to           

[facility]



     hypochlorit      8-12                105            area(s)           ity o

f



     e 0.25%      00:00:                               daily.           [place]



     solution      00                                                Medical



                                                                 [facility]

 

     sodium      2022-0      Yes       [id number]           Apply to           

[facility]



     hypochlorit      8-12                105            area(s)           ity o

f



     e 0.25%      00:00:                               daily.           [place]



     solution      00                                                Medical



                                                                 [facility]

 

     sodium      2022-0      Yes       [id number]           Apply to           

[facility]



     hypochlorit      8-12                105            area(s)           ity o

f



     e 0.25%      00:00:                               daily.           [place]



     solution      00                                                Medical



                                                                 [facility]

 

     sodium      2022-0      Yes       [id number]           Apply to           

[facility]



     hypochlorit      8-12                105            area(s)           ity o

f



     e 0.25%      00:00:                               daily.           [place]



     solution      00                                                Medical



                                                                 [facility]

 

     sodium      2022-0      Yes       [id number]           Apply to           

[facility]



     hypochlorit      8-12                105            area(s)           ity o

f



     e 0.25%      00:00:                               daily.           [place]



     solution      00                                                Medical



                                                                 [facility]

 

     sodium      2022-0      Yes       [id number]           Apply to           

[facility]



     hypochlorit      8-12                105            area(s)           ity o

f



     e 0.25%      00:00:                               daily.           [place]



     solution      00                                                Medical



                                                                 [facility]

 

     sodium      2022-0      Yes       [id number]           Apply to           

[facility]



     hypochlorit      8-12                105            area(s)           ity o

f



     e 0.25%      00:00:                               daily.           [place]



     solution      00                                                Medical



                                                                 [facility]

 

     sodium      2022-0      Yes       [id number]           Apply to           

[facility]



     hypochlorit      8-12                105            area(s)           ity o

f



     e 0.25%      00:00:                               daily.           [place]



     solution      00                                                Medical



                                                                 [facility]

 

     sodium      2022-0      Yes       [id number]           Apply to           

[facility]



     hypochlorit      8-12                105            area(s)           ity o

f



     e 0.25%      00:00:                               daily.           [place]



     solution      00                                                Medical



                                                                 [facility]

 

     sodium      2022-0      Yes       [id number]           Apply to           

[facility]



     hypochlorit      8-12                105            area(s)           ity o

f



     e 0.25%      00:00:                               daily.           [place]



     solution      00                                                Medical



                                                                 [facility]

 

     sodium      2022-0      Yes       [id number]           Apply to           

[facility]



     hypochlorit      8-12                105            area(s)           ity o

f



     e 0.25%      00:00:                               daily.           [place]



     solution      00                                                Medical



                                                                 [facility]

 

     sodium      2022-0      Yes       [id number]           Apply to           

[facility]



     hypochlorit      8-12                105            area(s)           ity o

f



     e 0.25%      00:00:                               daily.           [place]



     solution      00                                                Medical



                                                                 [facility]

 

     sodium      2022-0      Yes       [id number]           Apply to           

[facility]



     hypochlorit      8-12                105            area(s)           ity o

f



     e 0.25%      00:00:                               daily.           [place]



     solution      00                                                Medical



                                                                 [facility]

 

     sodium      2022-0      Yes       [id number]           Apply to           

[facility]



     hypochlorit      8-12                105            area(s)           ity o

f



     e 0.25%      00:00:                               daily.           [place]



     solution      00                                                Medical



                                                                 [facility]

 

     sodium      2022-0      Yes       [id number]           Apply to           

[facility]



     hypochlorit      8-12                105            area(s)           ity o

f



     e 0.25%      00:00:                               daily.           [place]



     solution      00                                                Medical



                                                                 [facility]

 

     sodium      2022-0      Yes       [id number]           Apply to           

[facility]



     hypochlorit      8-12                105            area(s)           ity o

f



     e 0.25%      00:00:                               daily.           [place]



     solution      00                                                Medical



                                                                 [facility]

 

     sodium      2022-0      Yes       [id number]           Apply to           

[facility]



     hypochlorit      8-12                105            area(s)           ity o

f



     e 0.25%      00:00:                               daily.           [place]



     solution      00                                                Medical



                                                                 [facility]

 

     sodium      2022-0      Yes       [id number]           Apply to           

[facility]



     hypochlorit      8-12                105            area(s)           ity o

f



     e 0.25%      00:00:                               daily.           [place]



     solution      00                                                Medical



                                                                 [facility]

 

     sodium      2022-0      Yes       [id number]           Apply to           

[facility]



     hypochlorit      8-12                105            area(s)           ity o

f



     e 0.25%      00:00:                               daily.           [place]



     solution      00                                                Medical



                                                                 [facility]

 

     sodium      2022-0      Yes       [id number]           Apply to           

[facility]



     hypochlorit      8-12                105            area(s)           ity o

f



     e 0.25%      00:00:                               daily.           [place]



     solution      00                                                Medical



                                                                 [facility]

 

     sodium      2022-0      Yes       [id number]           Apply to           

[facility]



     hypochlorit      8-12                105            area(s)           ity o

f



     e 0.25%      00:00:                               daily.           [place]



     solution      00                                                Medical



                                                                 [facility]

 

     sodium      2022-0      Yes       [id number]           Apply to           

[facility]



     hypochlorit      8-12                105            area(s)           ity o

f



     e 0.25%      00:00:                               daily.           [place]



     solution      00                                                Medical



                                                                 [facility]

 

     sodium      2022-0      Yes       [id number]           Apply to           

[facility]



     hypochlorit      8-12                105            area(s)           ity o

f



     e 0.25%      00:00:                               daily.           [place]



     solution      00                                                Medical



                                                                 [facility]

 

     sodium      2022-0      Yes       [id number]           Apply to           

[facility]



     hypochlorit      8-12                105            area(s)           ity o

f



     e 0.25%      00:00:                               daily.           [place]



     solution      00                                                Medical



                                                                 [facility]

 

     sodium      2022-0      Yes       [id number]           Apply  to          

 [facility]



     hypochlorit      8-12                105            area(s)           ity o

f



     e 0.25%      00:00:                               daily.           [place]



     solution      00                                                Medical



                                                                 [facility]

 

     sodium      2022-0      Yes       [id number]           Apply to           

[facility]



     hypochlorit                      105            area(s)           ity o

f



     e 0.25%      00:00:                               daily.           [address]

 

     repaglinide      2022- No        [id number] .5mg      Take 1         

  [facility]



     0.5 mg                                tablet by           ity of



     tablet      00:00: 00:00                          mouth in           [place]



               00   :00                           [facility]



                                                  and 1           



                                                  tablet at           



                                                  noon and 1           



                                                  tablet in           



                                                  the            



                                                  evening.           



                                                  Take           



                                                  before           



                                                  meals.           

 

     repaglinide      2022- No        [id number] .5mg      Take 1         

  [facility]



     0.5 mg                                tablet by           ity of



     tablet      00:00: 00:00                          mouth in           [place]



               00   :00                           [facility]



                                                  and 1           



                                                  tablet at           



                                                  noon and 1           



                                                  tablet in           



                                                  the            



                                                  evening.           



                                                  Take           



                                                  before           



                                                  meals.           

 

     repaglinide      2022- No        [id number] .5mg      Take 1         

  [facility]



     0.5 mg                                tablet by           ity of



     tablet      00:00: 00:00                          mouth in           [place]



               00   :00                           [facility]



                                                  and 1           



                                                  tablet at           



                                                  noon and 1           



                                                  tablet in           



                                                  the            



                                                  evening.           



                                                  Take           



                                                  before           



                                                  meals.           

 

     apixaban 5      2022- No             5mg       Take 1           [facility]

ers



     mg tablet                                tablet by           ity 

of



               00:00: 04:59                          mouth in           [place]



               00   :00                           [facility]



                                                  and 1           



                                                  tablet in           



                                                  the            



                                                  evening.           



                                                  Do all           



                                                  this for           



                                                  30 days.           



                                                  Indication           



                                                  s:             



                                                  Symtomatic           



                                                  Superficia           



                                                  l Vein           



                                                  thrombosis           

 

     insulin      2022- No        [id number] 24U       inject 24          

 [facility]



     glargine                                Units           ity of



     100 unit/mL      00:00: 04:59                          under the           

[place]



     injection      00   :00                           skin at           [facility]



                                                  bedtime           [place]



                                                  for 30           



                                                  days.           

 

     metFORMIN      2022- No        [id number] 500mg      Take 1          

 [facility]



     500 mg                                tablet by           ity of



     tablet      00:00: 04:59                          mouth in           [place]



               00   :00                           [facility]



                                                  and 1           



                                                  tablet in           



                                                  the            



                                                  evening.           



                                                  Take with           



                                                  meals. Do           



                                                  all this           



                                                  for 30           



                                                  days.           

 

     dulaglutide      2- No        [id number] .75mg      inject 1      

     [facility]



     (TRULICITY)      8-12 09-12                          Pen under           it

y of



     0.75 mg/0.5      00:00: 04:59                          the skin           [facility]

[facility]



     mL PnIj      00   :00                           weekly for           [facility]

l



                                                  30 days.           [facility]

 

     apixaban 5      2022- No             5mg       Take 1           [facility]

ers



     mg tablet                                tablet by           ity 

of



               00:00: 04:59                          mouth in           [place]



               00   :00                           the            Medical



                                                  morning           [facility]



                                                  and 1           



                                                  tablet in           



                                                  the            



                                                  evening.           



                                                  Do all           



                                                  this for           



                                                  30 days.           



                                                  Indication           



                                                  s:             



                                                  Symtomatic           



                                                  Superficia           



                                                  l Vein           



                                                  thrombosis           

 

     insulin      2022- No        [id number] 24U       inject 24          

 [facility]



     glargine                                Units           ity of



     100 unit/mL      00:00: 04:59                          under the           

[place]



     injection      00   :00                           skin [facility]



                                                  for 30           



                                                  days.           

 

     metFORMIN      -2022- No        [id number] 500mg      Take 1          

 [facility]



     500 mg                                tablet by           ity of



     tablet      00:00: 04:59                          mouth in           [place]



               00   :00                           the            [facility]



                                                  and 1           



                                                  tablet in           



                                                  the            



                                                  evening.           



                                                  Take with           



                                                  meals. Do           



                                                  all this           



                                                  for 30           



                                                  days.           

 

     dulaglutide      2022- No        [id number] .75mg      inject 1      

     [facility]



     (TRULICITY)                                Pen under           it

y of



     0.75 mg/0.5      00:00: 04:59                          the skin           [facility]

[facility]



     mL PnIj      00   :00                           weekly for           [facility]

l



                                                  30 days.           [facility]

 

     apixaban 5      2022- No             5mg       Take 1           Univ

ers



     mg tablet                                tablet by           ity 

of



               00:00: 04:59                          mouth in           [place]



               00   :00                           the            [facility]



                                                  and 1           



                                                  tablet in           



                                                  the            



                                                  evening.           



                                                  Do all           



                                                  this for           



                                                  30 days.           



                                                  Indication           



                                                  s:             



                                                  Symtomatic           



                                                  Superficia           



                                                  l Vein           



                                                  thrombosis           

 

     insulin      2022- No        [id number] 24U       inject 24          

 [facility]



     glargine                                Units           ity of



     100 unit/mL      00:00: 04:59                          under the           

[place]



     injection      00   :00                           skin [facility]



                                                  for 30           



                                                  days.           

 

     metFORMIN      -2022- No        [id number] 500mg      Take 1          

 [facility]



     500 mg                                tablet by           ity of



     tablet      00:00: 04:59                          mouth in           [place]



               00   :00                           the            [facility]



                                                  and 1           



                                                  tablet in           



                                                  the            



                                                  evening.           



                                                  Take with           



                                                  meals. Do           



                                                  all this           



                                                  for 30           



                                                  days.           

 

     dulaglutide      2022- No        [id number] .75mg      inject 1      

     [facility]



     (TRULICITY)                                Pen under           it

y of



     0.75 mg/0.5      00:00: 04:59                          the skin           [facility]

[place]



     mL PnIj      00   :00                           weekly for           [facility]

l



                                                  30 days.           [facility]

 

     apixaban 5      2022- No             5mg       Take 1           Univ

ers



     mg tablet                                tablet by           ity 

of



               00:00: 04:59                          mouth in           [place]



               00   :00                           the            Medical



                                                  morning           [facility]



                                                  and 1           



                                                  tablet in           



                                                  the            



                                                  evening.           



                                                  Do all           



                                                  this for           



                                                  30 days.           



                                                  Indication           



                                                  s:             



                                                  Symtomatic           



                                                  Superficia           



                                                  l Vein           



                                                  thrombosis           

 

     insulin      2022- No        [id number] 24U       inject 24          

 [facility]



     glargine                                Units           ity of



     100 unit/mL      00:00: 04:59                          under the           

[place]



     injection      00   :00                           skin at           [facility]



                                                  bedtime           [facility]



                                                  for 30           



                                                  days.           

 

     metFORMIN      2022- No        [id number] 500mg      Take 1          

 [facility]



     500 mg                                tablet by           ity of



     tablet      00:00: 04:59                          mouth in           [place]



               00   :00                           the            Medical



                                                  morning           [facility]



                                                  and 1           



                                                  tablet in           



                                                  the            



                                                  evening.           



                                                  Take with           



                                                  meals. Do           



                                                  all this           



                                                  for 30           



                                                  days.           

 

     dulaglutide      2022- No        [id number] .75mg      inject 1      

     [facility]



     (TRULICITY)                                Pen under           it

y of



     0.75 mg/0.5      00:00: 04:59                          the skin           [facility]

[place]



     mL PnIj      00   :00                           weekly for           [facility]

l



                                                  30 days.           [facility]

 

     linezolid      2022- No        [id number] 600mg      Take 1          

 [facility]



     600 mg                                tablet by           ity of



     tablet      00:00: 04:59                          mouth           [place]



               00   :00                           every 12           Medical



                                                  (twelve)           [facility]



                                                  hours for           



                                                  14 days.           

 

     fluconazole      2022- No        [id number] 400mg      Take 2        

   [facility]



     200 mg                                tablets by           ity of



     tablet      00:00: 04:59                          mouth in           [place]



               00   :00                           the            Medical



                                                  morning           [facility]



                                                  for 14           



                                                  days.           

 

     ciprofloxac      2022- No        [id number] 500mg      Take 2        

   [facility]



     in HCl 250                                tablets by           it

y of



     mg tablet      00:00: 04:59                          mouth in           [place]

as



               00   :00                           the            Medical



                                                  morning           [facility]



                                                  and 2           



                                                  tablets in           



                                                  the            



                                                  evening.           



                                                  Do all           



                                                  this for           



                                                  14 days.           

 

     linezolid      -0 - No        [id number] 600mg      Take 1          

 [facility]



     600 mg      8-12 08-27                          tablet by           ity of



     tablet      00:00: 04:59                          mouth           [place]



               00   :00                           every 12           Medical



                                                  (twelve)           [facility]



                                                  hours for           



                                                  14 days.           

 

     fluconazole      -0 - No        [id number] 400mg      Take 2        

   [facility]



     200 mg      8-12 08-27                          tablets by           ity of



     tablet      00:00: 04:59                          mouth in           [place]



               00   :00                           the            Medical



                                                  morning           [facility]



                                                  for 14           



                                                  days.           

 

     ciprofloxac      -0 - No        [id number] 500mg      Take 2        

   [facility]



     in HCl 250      8- 08-27                          tablets by           it

y of



     mg tablet      00:00: 04:59                          mouth in           [place]

as



               00   :00                           the            Medical



                                                  morning           [facility]



                                                  and 2           



                                                  tablets in           



                                                  the            



                                                  evening.           



                                                  Do all           



                                                  this for           



                                                  14 days.           

 

     linezolid      -0 - No        [id number] 600mg      Take 1          

 [facility]



     600 mg      8-12 08-27                          tablet by           ity of



     tablet      00:00: 04:59                          mouth           [place]



               00   :00                           every 12           [facility]



                                                  (twelve)           [facility]anch



                                                  hours for           



                                                  14 days.           

 

     fluconazole      -0 - No        [id number] 400mg      Take 2        

   [facility]



     200 mg      8-12 08-27                          tablets by           ity of



     tablet      00:00: 04:59                          mouth in           [place]



               00   :00                           the            Medical



                                                  morning           [facility]



                                                  for 14           



                                                  days.           

 

     ciprofloxac      -0 - No        [id number] 500mg      Take 2        

   [facility]



     in HCl 250      8- 08-27                          tablets by           it

y of



     mg tablet      00:00: 04:59                          mouth in           [place]

as



               00   :00                           the            Medical



                                                  morning           [facility]



                                                  and 2           



                                                  tablets in           



                                                  the            



                                                  evening.           



                                                  Do all           



                                                  this for           



                                                  14 days.           

 

     linezolid      -0 - No        [id number] 600mg      Take 1          

 [facility]



     600 mg      8-12 08-27                          tablet by           ity of



     tablet      00:00: 04:59                          mouth           [place]



               00   :00                           every 12           Medical



                                                  (twelve)           [facility]



                                                  hours for           



                                                  14 days.           

 

     fluconazole      2-0 - No        [id number] 400mg      Take 2        

   [facility]



     200 mg      8-12 08-27                          tablets by           ity of



     tablet      00:00: 04:59                          mouth in           [place]



               00   :00                           the            Medical



                                                  morning           [facility]



                                                  for 14           



                                                  days.           

 

     ciprofloxac       No        [id number] 500mg      Take 2        

   [facility]



     in HCl 250                                tablets by           it

y of



     mg tablet      00:00: 04:59                          mouth in           [facility]

as



               00   :00                           the            Medical



                                                  morning           [facility]



                                                  and 2           



                                                  tablets in           



                                                  the            



                                                  evening.           



                                                  Do all           



                                                  this for           



                                                  14 days.           

 

     gabapentin       No        [id number] 300mg      Take 1         

  [facility]



     300 mg      20                          capsule by           ity of



     capsule      00:00: 04:59                          mouth in           [place]



               00   :00                           the            [facility]



                                                  and 1           



                                                  capsule at           



                                                  noon and 1           



                                                  capsule in           



                                                  the            



                                                  evening.           



                                                  Do all           



                                                  this for 7           



                                                  days.           

 

     gabapentin       No        [id number] 300mg      Take 1         

  [facility]



     300 mg      20                          capsule by           ity of



     capsule      00:00: 04:59                          mouth in           [place]



               00   :00                           the            [facility]



                                                  and 1           



                                                  capsule at           



                                                  noon and 1           



                                                  capsule in           



                                                  the            



                                                  evening.           



                                                  Do all           



                                                  this for 7           



                                                  days.           

 

     gabapentin       No        [id number] 300mg      Take 1         

  [facility]



     300 mg      20                          capsule by           ity of



     capsule      00:00: 04:59                          mouth in           [place]



               00   :00                           the            [facility]



                                                  and 1           



                                                  capsule at           



                                                  noon and 1           



                                                  capsule in           



                                                  the            



                                                  evening.           



                                                  Do all           



                                                  this for 7           



                                                  days.           

 

     gabapentin       No        [id number] 300mg      Take 1         

  [facility]



     300 mg                                capsule by           ity of



     capsule      00:00: 04:59                          mouth in           [place]



               00   :00                           the            [facility]



                                                  and 1           



                                                  capsule at           



                                                  noon and 1           



                                                  capsule in           



                                                  the            



                                                  evening.           



                                                  Do all           



                                                  this for 7           



                                                  days.           

 

     sodium       No        [id number]           Apply to           U

[facility]



     hypochlorit                                area(s)           ity 

of



     e 0.25%      00:00: 00:00                          daily.           [place]



     solution      00   :00                                          [facility]

 

     repaglinide       No        [id number] .5mg      Take 1         

  [facility]



     0.5 mg                                tablet by           ity of



     tablet      00:00: 00:00                          mouth in           [place]



               00   :00                           the            [facility]



                                                  and 1           



                                                  tablet at           



                                                  noon and 1           



                                                  tablet in           



                                                  the            



                                                  evening.           



                                                  Take           



                                                  before           



                                                  meals. Do           



                                                  all this           



                                                  for 30           



                                                  days.           

 

     Sliding      2022-0 2022- No                       Subcutaneo           Uni

vers



     Scale                                us, TID           ity of



     Insulin -      22:00: 12:34                          MEALS+HS,           [place]

[place]



     Lispro      00   :51                           First dose           Medical



     (HumaLOG) +                                         (after           [facility]



     Fsbg                                         last           



     Testing                                         modificati           



                                                  on) on 22 at           



                                                  1700,           



                                                  Until           



                                                  Discontinu           



                                                  ed,            



                                                  Routine           

 

     acetaminoph            Yes            1000mg      1,000 mg,          

 [facility]



     en                                       Oral, Q8H,           ity of



     (TYLENOL)      19:00:                               First dose           [facility]

[place]



     tablet      00                                 on Thu           Medical



     1,000 mg                                         22 at           [facility]

h



                                                  1400,           



                                                  Until           



                                                  Discontinu           



                                                  ed,            



                                                  Routine           

 

     methocarbam            Yes            500mg      500 mg,           Un

[place]



     oL                                       Oral, Q6H,           ity of



     (ROBAXIN)      17:00:                               First dose           Te

[place]



     tablet 500      00                                 on Thu           Medical



     mg                                           22 at           [facility]



                                                  1200,           



                                                  Until           



                                                  Discontinu           



                                                  ed,            



                                                  Routine           

 

     Sliding      2022- No                       Subcutaneo           Uni

vers



     Scale                                , TID           ity of



     Insulin -      17:00: 19:31                          MEALS+HS,           Te

[place]



     Lispro      00   :30                           First dose           Medical



     (HumaLOG) +                                         (after           Branch



     Fsbg                                         last           



     Testing                                         modificati           



                                                  on) on 22 at           



                                                  1200,           



                                                  Until           



                                                  Discontinu           



                                                  ed,            



                                                  Routine           

 

     insulin NPH            Yes            24U       24 Units,           U

nivers



     (HUMULIN N)                                     Subcutaneo           it

y of



     injection      14:00:                               us, QAM,           [place]

s



     24 Units      00                                 First dose           Medic

al



                                                  (after           [facility]



                                                  last           



                                                  modificati           



                                                  on) on 22 at           



                                                  0900,           



                                                  Until           



                                                  Discontinu           



                                                  ed,            



                                                  Routine           

 

     insulin NPH      2022- No             20U       20 Units,           

[facility]



     (HUMULIN N)                                Subcutaneo           i

ty of



     injection      14:00: 19:31                          us, QAM,           [facility]

as



     20 Units      00   :02                           First dose           Medic

al



                                                  (after           [facility]



                                                  last           



                                                  modificati           



                                                  on) on 22 at           



                                                  0900,           



                                                  Until           



                                                  Discontinu           



                                                  ed,            



                                                  Routine           

 

     insulin            Yes            5U        5 Units,           Univer

s



     lispro                                     Subcutaneo           ity of



     (human)      13:45:                               us, TID           [place]



     (HumaLOG      00                                 MEALS,           Medical



     U-100)                                         First dose           [facility]



     injection 5                                         (after           



     Units                                         last           



                                                  modificati           



                                                  on) on Thu           



                                                  22 at           



                                                  0845,           



                                                  Until           



                                                  Discontinu           



                                                  ed,            



                                                  Routine           

 

     apixaban            Yes            5mg       5 mg,           [facility]



     (ELIQUIS)                                     Oral, BID,           ity 

of



     tablet 5 mg      13:00:                               First dose           

[place]



               00                                 on Thu           Medical



                                                  22 at           [facility]



                                                  0800,           



                                                  Until           



                                                  Discontinu           



                                                  ed,            



                                                  Routine<br           



                                                  >Indicatio           



                                                  ns: DVT/PE           

 

     celecoxib            Yes            100mg      100 mg,           Univ

ers



     (CELEBREX)                                     Oral, BID           ity 

of



     capsule 100      13:00:                               MEALS,           [place]

[facility]



     mg        00                                 First dose           Medical



                                                  on Thu           [facility]



                                                  22 at           



                                                  0800,           



                                                  Until           



                                                  Discontinu           



                                                  ed,            



                                                  Routine           

 

     gabapentin            Yes            300mg      300 mg,           Uni

vers



     (NEURONTIN)                                     Oral, TID,           it

y of



     capsule 300      13:00:                               First dose           

[place]



     mg        00                                 on Thu           Medical



                                                  22 at           [facility]



                                                  0800,           



                                                  Until           



                                                  Discontinu           



                                                  ed,            



                                                  Routine           

 

     HYDROmorpho            Yes            4mg       4 mg,           Unive

rs



     ne                                       Oral, TID,           ity of



     (DILAUDID)      13:00:                               First dose           [facility]

[facility]



     tablet 4 mg      00                                 (after           Medica

l



                                                  last           [facility]



                                                  modificati           



                                                  on) on Thu           



                                                  22 at           



                                                  0800,           



                                                  Until           



                                                  Discontinu           



                                                  ed,            



                                                  Routine           

 

     linezolid      2022- No             600mg      600 mg,           Uni

vers



     (ZYVOX)                                Oral,           ity of



     tablet 600      03:15: 03:49                          Q12H, 1           [facility]

as



     mg        00   :00                           dose,           Medical



                                                  First dose           [facility]



                                                  on Wed           



                                                  8/10/22 at           



                                                  2215,           



                                                  ASAP<br>Re           



                                                  ason for           



                                                  Anti-Infec           



                                                  tive:           



                                                  Documented           



                                                  Infection<           



                                                  br>Documen           



                                                  huma            



                                                  Infection           



                                                  Site: Skin           



                                                  / Soft           



                                                  Tissue<br>           



                                                  Duration           



                                                  of             



                                                  Therapy: 7           



                                                  days<br>Re           



                                                  stricted           



                                                  use            



                                                  approved           



                                                  by: AUSTIN SPANGLER,           



                                                  ADULT ID           

 

     heparin      2022- No             5000U      5,000           [facility]



     (porcine)                                Units,           ity of



     injection      01:00: 12:21                          Subcutaneo           T

[place]



     5,000 Units      00   :15                           us, BID,           Medi

[facility]



                                                  First dose           [facility]



                                                  on Wed           



                                                  8/10/22 at           



                                                  2000,           



                                                  Until           



                                                  Discontinu           



                                                  ed,            



                                                  Routine           

 

     insulin            Yes            10U       10 Units,           Unive

rs



     lispro      8-10                               Subcutaneo           ity of



     (human)      22:00:                               us, TID           [place]



     (HumaLOG      00                                 MEALS,           Medical



     U-100)                                         First dose           [facility]



     injection                                         (after           



     10 Units                                         last           



                                                  modificati           



                                                  on) on Wed           



                                                  8/10/22 at           



                                                  1700,           



                                                  Until           



                                                  Discontinu           



                                                  ed,            



                                                  Routine           

 

     insulin NPH            Yes            20U       20 Units,           U

nivers



     (HUMULIN N)      8-10                               Subcutaneo           it

y of



     injection      22:00:                               us, QPM,           [place]

[facility]



     20 Units      00                                 First dose           Medic

al



                                                  (after           [facility]



                                                  last           



                                                  modificati           



                                                  on) on Wed           



                                                  8/10/22 at           



                                                  1700,           



                                                  Until           



                                                  Discontinu           



                                                  ed,            



                                                  Routine           

 

     insulin NPH      2022- No             20U       20 Units,           

[facility]



     (HUMULIN N)      8-10 08-11                          Subcutaneo           i

ty of



     injection      22:00: 13:38                          us, QPM,           [facility]

as



     20 Units      00   :17                           First dose           Medic

al



                                                  (after           [facility]



                                                  last           



                                                  modificati           



                                                  on) on Wed           



                                                  8/10/22 at           



                                                  1700,           



                                                  Until           



                                                  Discontinu           



                                                  ed,            



                                                  Routine           

 

     HYDROmorpho            Yes            4mg       4 mg,           Unive

rs



     ne        8-10                               Oral,           ity of



     (DILAUDID)      15:00:                               TIDPRN,           [place]

[facility]



     tablet 4 mg      00                                 Starting           Medi

[facility]



                                                  on Wed           [facility]



                                                  8/10/22 at           



                                                  1000,           



                                                  Until           



                                                  Discontinu           



                                                  ed,            



                                                  Routine,           



                                                  Pain           



                                                  (scale           



                                                  7-10)           

 

     HYDROmorpho      2022- No             4mg       4 mg,           Univ

ers



     ne        8-10 08-11                          Oral,           ity of



     (DILAUDID)      15:00: 11:04                          TIDPRN,           [facility]

as



     tablet 4 mg      00   :00                           Starting           Medi

[facility]



                                                  on Wed           [facility]



                                                  8/10/22 at           



                                                  1000,           



                                                  Until Thu           



                                                  22 at           



                                                  0604,           



                                                  Routine,           



                                                  Pain           



                                                  (scale           



                                                  7-10)           

 

     insulin NPH      -2022- No             20U       20 Units,           

[facility]



     (HUMULIN N)      8-10 08-10                          Subcutaneo           i

ty of



     injection      14:00: 18:34                          us, QAM,           [facility]

as



     20 Units      00   :03                           First dose           Medic

al



                                                  (after           [facility]



                                                  last           



                                                  modificati           



                                                  on) on Wed           



                                                  8/10/22 at           



                                                  0900,           



                                                  Until           



                                                  Discontinu           



                                                  ed,            



                                                  Routine           

 

     Sliding            Yes                      Subcutaneo           Univ

ers



     Scale      8-10                               us, TID           ity of



     Insulin -      13:00:                               MEALS+HS,           [facility]

as



     Lispro      00                                 First dose           Medical



     (HumaLOG) +                                         (after           [facility]



     Fsbg                                         last           



     Testing                                         modificati           



                                                  on) on Wed           



                                                  8/10/22 at           



                                                  0800,           



                                                  Until           



                                                  Discontinu           



                                                  ed,            



                                                  Routine           

 

     Sliding      2022- No                       Subcutaneo           Uni

vers



     Scale      8-10 08-11                          us, TID           ity of



     Insulin -      13:00: 13:39                          MEALS+HS,           [facility]

[facility]



     Lispro      00   :23                           First dose           Medical



     (HumaLOG) +                                         (after           [facility]



     Fsbg                                         last           



     Testing                                         modificati           



                                                  on) on Wed           



                                                  8/10/22 at           



                                                  0800,           



                                                  Until           



                                                  Discontinu           



                                                  ed,            



                                                  Routine           

 

     FENTanyl PF      2022- No             25ug      25 mcg,           Un

[facility]



     (SUBLIMAZE      8-10 08-10                          Slow IV           ity o

f



     (PF))      03:00: 03:26                          Push,           [place]



     injection      00   :00                           ONCE, 1           Medical



     25 mcg                                         dose, On           [facility]



                                                  22           



                                                  at 2200,           



                                                  Routine           

 

     Sliding      2022- No                       Subcutaneo           Uni

vers



     Scale      8-09 08-10                          us, TID           ity of



     Insulin -      22:00: 12:44                          MEALS+HS,           [facility]

[facility]



     Lispro      00   :50                           First dose           Medical



     (HumaLOG) +                                         (after           [facility]



     Fsbg                                         last           



     Testing                                         modificati           



                                                  on) on 22 at           



                                                  1700,           



                                                  Until           



                                                  Discontinu           



                                                  ed,            



                                                  Routine           

 

     insulin NPH      2022- No             30U       30 Units,           

[facility]



     (HUMULIN N)      8-09 08-10                          Subcutaneo           i

ty of



     injection      22:00: 12:44                          us, QPM,           [facility]

as



     30 Units      00   :50                           First dose           Medic

al



                                                  (after           [facility]



                                                  last           



                                                  modificati           



                                                  on) on 22 at           



                                                  1700,           



                                                  Until           



                                                  Discontinu           



                                                  ed,            



                                                  Routine           

 

     FENTanyl PF      2022- No             25ug      25 mcg,           Un

[facility]



     (SUBLIMAZE                                Slow IV           ity o

f



     (PF))      17:54: 18:30                          Push,           [place]



     injection      44   :23                           Q5MIN PRN,           Medi

[facility]



     25 mcg                                         4 doses,           [facility]



                                                  Starting           



                                                  on 22 at           



                                                  1254,           



                                                  Until 22 at           



                                                  1330,           



                                                  Routine,           



                                                  Pain           



                                                  (scale           



                                                  7-10),           



                                                  PACU           

 

     ketamine      2022- No                       Intravenou           Un

[place]



     (KETALAR)                                s, ONCE           ity of



     injection      17:26: 17:56                          INTRA           [place]



               00   :00                           PROCEDURE,           Medical



                                                  Starting           [facility]



                                                  on 22 at           



                                                  1226,           



                                                  Until 22 at           



                                                  1256,           



                                                  Routine,           



                                                  Intra-op           

 

     clindamycin      2022- No                       IV             Unive

rs



     in 5 %                                Piggyback,           ity of



     dextrose      17:18: 17:56                          ONCE INTRA           [facility]

[facility]



     (CLEOCIN)      00   :00                           PROCEDURE,           Medi

[facility]



     900 mg/50                                         Starting           [facility]



     mL IV                                         on Tue           



     piggyback                                         22 at           



     RTU                                          1218,           



                                                  Until 22 at           



                                                  1256,           



                                                  Administer           



                                                  over 30           



                                                  Minutes,           



                                                  Intra-op           

 

     bupivacaine      2022- No                       PRN,           Unive

rs



     (preserv                                Starting           ity of



     free) 0.5%      17:00: 17:54                          on Tue           [place]

[facility]



     (SENSORCAIN      00   :05                           22 at           Med

ical



     E MPF) 0.5                                         1200,           [facility]



     % (5 mg/mL)                                         Intra-op           



     10 mL,                                                        



     lidocaine                                                        



     1% (PF)                                                        



     (XYLOCAINE)                                                        



     10 mL                                                        

 

     phenylephri      2022- No                       Intravenou          

 [facility]



     ne                                  s, ONCE           ity of



     (VAZCULEP)      16:41: 17:56                          INTRA           [place]



     injection      00   :00                           PROCEDURE,           Medi

[facility]



                                                  Starting           [facility]



                                                  on 22 at           



                                                  1141,           



                                                  Until 22 at           



                                                  1256,           



                                                  Routine,           



                                                  Intra-op           

 

     dexmedeTOMI      2022- No                       Intravenou          

 [facility]



     Dine                                s, ONCE           ity of



     (PRECEDEX)      16:31: 17:56                          INTRA           [place]



     injection      00   :00                           PROCEDURE,           Medi

sarah



                                                  Starting           [facility]



                                                  on 22 at           



                                                  1131,           



                                                  Until 22 at           



                                                  1256,           



                                                  Routine,           



                                                  Intra-op           

 

     propofoL IV      2022- No                       IV             Unive

rs



     infusion                                Infusion,           ity o

f



               16:31: 17:56                          CONTINUOUS           [place]



               00   :00                           PRN,           Medical



                                                  Starting           [facility]



                                                  on 22 at           



                                                  1131,           



                                                  Until 22 at           



                                                  1256,           



                                                  Routine,           



                                                  Intra-op           

 

     FENTanyl PF      2022-                        Epidural,           

[facility]



     (SUBLIMAZE                                ONCE INTRA           it

y of



     (PF))      16:31: 17:56                          PROCEDURE,           [place]



     injection      00   :00                           Starting           Medica

l



                                                  on 22 at           



                                                  1131,           



                                                  Until 22 at           



                                                  1256,           



                                                  Routine,           



                                                  Intra-op           

 

     midazolam      2022-                        IV Push,           Uni

vers



     (VERSED)                                ONCE INTRA           ity 

of



     injection      16:31: 17:56                          PROCEDURE,           [place]

[place]



               00   :00                           Starting           Medical



                                                  on Tue           [facility]



                                                  22 at           



                                                  1131,           



                                                  Until 22 at           



                                                  1256,           



                                                  Routine,           



                                                  Intra-op           

 

     lactated      2022- No                       IV             [facility]



     ringers IV                                Infusion,           ity

 of



     infusion      16:25: 17:56                          CONTINUOUS           [place]

[place]



               00   :00                           PRN,           Medical



                                                  Starting           [facility]



                                                  on 22 at           



                                                  1125,           



                                                  Until 22 at           



                                                  1256,           



                                                  Routine,           



                                                  Intra-op           

 

     insulin NPH      2022- No             34U       34 Units,           

[facility]



     (HUMULIN N)      8-09 08-10                          Subcutaneo           i

ty of



     injection      14:00: 12:44                          us, QAM,           [facility]

as



     34 Units      00   :50                           First dose           Medic

al



                                                  (after           [facility]



                                                  last           



                                                  modificati           



                                                  on) on 22 at           



                                                  0900,           



                                                  Until           



                                                  Discontinu           



                                                  ed,            



                                                  Routine           

 

     insulin       No             14U       14 Units,           Univ

ers



     lispro      8-09 08-10                          Subcutaneo           ity of



     (human)      13:00: 12:44                          us, TID           [place]



     (HumaLOG      00   :50                           MEALS,           Medical



     U-100)                                         First dose           [facility]



     injection                                         (after           



     14 Units                                         last           



                                                  modificati           



                                                  on) on 22 at           



                                                  0800,           



                                                  Until           



                                                  Discontinu           



                                                  ed,            



                                                  Routine           

 

     insulin NPH      2022- No             32U       32 Units,           

[facility]



     (HUMULIN N)                                Subcutaneo           i

ty of



     injection      22:00: 12:53                          us, QPM,           [facility]

as



     32 Units      00   :44                           First dose           Medic

al



                                                  (after           [facility]



                                                  last           



                                                  modificati           



                                                  on) on 22 at           



                                                  1700,           



                                                  Until           



                                                  Discontinu           



                                                  ed,            



                                                  Routine           

 

     HYDROmorpho       No             4mg       4 mg,           Univ

ers



     ne        8-08 08-10                          Oral,           ity of



     (DILAUDID)      16:30: 14:59                          BIDPRN,           [facility]

as



     tablet 4 mg      00   :38                           Starting           Medi

[facility]



                                                  on 22 at           



                                                  1130,           



                                                  Until Wed           



                                                  8/10/22 at           



                                                  0959,           



                                                  Routine,           



                                                  Pain           



                                                  (scale           



                                                  7-10)           

 

     insulin NPH       No             36U       36 Units,           

[facility]



     (HUMULIN N)                                Subcutaneo           i

ty of



     injection      14:00: 12:53                          us, QAM,           [facility]

as



     36 Units      00   :44                           First dose           Medic

al



                                                  (after           [facility]



                                                  last           



                                                  modificati           



                                                  on) on 22 at           



                                                  0900,           



                                                  Until           



                                                  Discontinu           



                                                  ed,            



                                                  Routine           

 

     insulin       No             15U       15 Units,           Univ

ers



     lispro                                Subcutaneo           ity of



     (human)      13:30: 12:53                          us, TID           [place]



     (HumaLOG      00   :44                           MEALS,           Medical



     U-100)                                         First dose           [facility]



     injection                                         (after           



     15 Units                                         last           



                                                  modificati           



                                                  on) on 22 at           



                                                  0830,           



                                                  Until           



                                                  Discontinu           



                                                  ed,            



                                                  Routine           

 

     cholestyram            Yes                      Topical           Uni

vers



     ine-nystati                                     (Apply To           ity

 of



     n-zinc      13:00:                               Affected           [place]



     oxide      00                                 Areas),           Medical



     ointment                                         BID, First           Br[facility]

h



     1:1:1                                         dose           



     (COMPOUNDED                                         (after           



     )                                            last           



                                                  modificati           



                                                  on) on 22 at           



                                                  0800,           



                                                  Until           



                                                  Discontinu           



                                                  ed,            



                                                  Routine           

 

     cholestyram      0      Yes                      Topical           Uni

vers



     ine-nystati                                     (Apply To           ity

 of



     n-zinc      13:00:                               Affected           [place]



     oxide      00                                 Areas),           Medical



     ointment                                         BID, First           [facility]

h



     1:1:1                                         dose           



     (COMPOUNDED                                         (after           



     )                                            last           



                                                  modificati           



                                                  on) on 22 at           



                                                  0800,           



                                                  Until           



                                                  Discontinu           



                                                  ed,            



                                                  Routine           

 

     magnesium       No             4g        4 g, IV           Univ

ers



     sulfate in                                Piggyback,           it

y of



     water 4      12:00: 15:57                          ONCE, 1           [place]



     gram/50 mL      00   :00                           dose, On           Medic

al



     (8 %) IV                                         Mon 22           [facility]

h



     Piggyback 4                                         at 0700,           



     g                                            Routine           

 

     eravacyclin      2022- No             1mg/kg      123 mg (1         

  [facility]



     e (XERAVA)                                mg/kg ?123           it

y of



     123 mg in      03:15: 00:10                          kg), IV           [place]

s



     NaCl 0.9%      00   :03                           Infusion,           Medic

al



     (NS) 250 mL                                         Q12H ABX,           Bra

nch



     IV infusion                                         Administer           



                                                  over 60           



                                                  Minutes,           



                                                  First dose           



                                                  on Sun           



                                                  22 at           



                                                  2215, For           



                                                  7 days           

 

     insulin NPH      2022- No             38U       38 Units,           

[facility]



     (HUMULIN N)                                Subcutaneo           i

ty of



     injection      22:00: 13:14                          us, QPM,           [facility]

as



     38 Units      00   :25                           First dose           Medic

al



                                                  (after           [facility]



                                                  last           



                                                  modificati           



                                                  on) on Sun           



                                                  22 at           



                                                  1700,           



                                                  Until           



                                                  Discontinu           



                                                  ed,            



                                                  Routine           

 

     insulin       No             18U       18 Units,           Univ

ers



     lispro                                Subcutaneo           ity of



     (human)      17:00: 13:15                          us, TID           [place]



     (HumaLOG      00   :02                           MEALS,           Medical



     U-100)                                         First dose           [facility]



     injection                                         (after           



     18 Units                                         last           



                                                  modificati           



                                                  on) on Sun           



                                                  22 at           



                                                  1200,           



                                                  Until           



                                                  Discontinu           



                                                  ed,            



                                                  Routine           

 

     insulin NPH       No             37U       37 Units,           

[facility]



     (HUMULIN N)                                Subcutaneo           i

ty of



     injection      14:00: 14:15                          us, QAM,           [facility]

as



     37 Units      00   :14                           First dose           Medic

al



                                                  (after           [facility]



                                                  last           



                                                  modificati           



                                                  on) on Sun           



                                                  22 at           



                                                  0900,           



                                                  Until           



                                                  Discontinu           



                                                  ed,            



                                                  Routine           

 

     Sliding       No                       Subcutaneo           Uni

vers



     Scale                                us, TID           ity of



     Insulin -      02:00: 12:53                          MEALS+HS,           [facility]

[place]



     Lispro      00   :44                           First dose           Medical



     (HumaLOG) +                                         (after           [facility]



     Fsbg                                         last           



     Testing                                         modificati           



                                                  on) on Sat           



                                                  22 at           



                                                  2100,           



                                                  Until           



                                                  Discontinu           



                                                  ed,            



                                                  Routine           

 

     HYDROmorpho      2022- No             2mg       2 mg,           Univ

ers



     ne         08-08                          Oral,           ity of



     (DILAUDID)      00:17: 16:23                          Q8HPRN,           [facility]

as



     tablet 2 mg      00   :26                           Starting           Medi

[facility]



                                                  on Sat           [facility]



                                                  22 at           



                                                  1917,           



                                                  Until Mon           



                                                  22 at           



                                                  1123,           



                                                  Routine,           



                                                  Pain           



                                                  (scale           



                                                  7-10)           

 

     insulin      2022- No             17U       17 Units,           Univ

ers



     lispro                                Subcutaneo           ity of



     (human)      22:00: 14:15                          us, TID           [place]



     (HumaLOG      00   :14                           MEALS,           Medical



     U-100)                                         First dose           [facility]



     injection                                         (after           



     17 Units                                         last           



                                                  modificati           



                                                  on) on Sat           



                                                  22 at           



                                                  1700,           



                                                  Until           



                                                  Discontinu           



                                                  ed,            



                                                  Routine           

 

     insulin NPH       No             35U       35 Units,           

[facility]



     (HUMULIN N)                                Subcutaneo           i

ty of



     injection      22:00: 13:38                          us, QPM,           [facility]

as



     35 Units      00   :53                           First dose           Medic

al



                                                  (after           [facility]



                                                  last           



                                                  modificati           



                                                  on) on Sat           



                                                  22 at           



                                                  1700,           



                                                  Until           



                                                  Discontinu           



                                                  ed,            



                                                  Routine           

 

     Sliding                             Subcutaneo           Uni

vers



     Scale       08-06                          us, Q4H,           ity of



     Insulin -      21:00: 22:06                          First dose           [facility]

[place]



     Lispro      00   :35                           (after           Medical



     (HumaLOG) +                                         last           [facility]



     Fsbg                                         modificati           



     Testing                                         on) on Sat           



                                                  22 at           



                                                  1600,           



                                                  Until           



                                                  Discontinu           



                                                  ed,            



                                                  Routine           

 

     HYDROmorpho      2022- No             4mg       4 mg,           Univ

ers



     ne                                  Oral,           ity of



     (DILAUDID)      17:24: 00:17                          Q8HPRN,           [facility]

as



     tablet 4 mg      27   :12                           Starting           Medi

[facility]



                                                  on Sat           [facility]



                                                  22 at           



                                                  1224,           



                                                  Until Sat           



                                                  22 at           



                                                  1917,           



                                                  Routine,           



                                                  Pain           



                                                  (scale           



                                                  7-10)           

 

     sodium      -0      Yes                      Topical,           [facility]



     hypochlorit      8                               DAILY,           ity of



     e 0.25%      14:00:                               First dose           [place]

[place]



     (DAKIN'S      00                                 on Sat           Medical



     SOLUTION)                                         22 at           [facility]

h



     solution                                         0900,           



                                                  Until           



                                                  Discontinu           



                                                  ed, ASAP           

 

     sodium      2022-0      Yes                      Topical,           [facility]



     hypochlorit                                     DAILY,           ity of



     e 0.25%      14:00:                               First dose           [place]

s



     (DAKIN'S      00                                 on Sat           Medical



     SOLUTION)                                         22 at           [facility]

h



     solution                                         0900,           



                                                  Until           



                                                  Discontinu           



                                                  ed, ASAP           

 

     insulin      2022- No             12U       12 Units,           Univ

ers



     lispro                                Subcutaneo           ity of



     (human)      14:00: 19:55                          us, TIDPC,           Eric

as



     (HumaLOG      00   :31                           First dose           Medic

al



     U-100)                                         (after           [facility]



     injection                                         last           



     12 Units                                         modificati           



                                                  on) on Sat           



                                                  22 at           



                                                  0900,           



                                                  Until           



                                                  Discontinu           



                                                  ed,            



                                                  Routine           

 

     insulin NPH                   26U       26 Units,           

[facility]



     (HUMULIN N)                                Subcutaneo           i

ty of



     injection      14:00: 19:55                          us, QAM,           Eric

as



     26 Units      00   :31                           First dose           Medic

al



                                                  (after           [facility]



                                                  last           



                                                  modificati           



                                                  on) on Sat           



                                                  22 at           



                                                  0900,           



                                                  Until           



                                                  Discontinu           



                                                  ed,            



                                                  Routine           

 

     Sliding                             Subcutaneo           Uni

vers



     Scale                                us, TID           ity of



     Insulin -      13:00: 19:55                          MEALS+HS,           [facility]

[place]



     Lispro      00   :31                           First dose           Medical



     (HumaLOG) +                                         on Sat           [facility]



     Fsbg                                         22 at           



     Testing                                         0800,           



                                                  Until           



                                                  Discontinu           



                                                  ed,            



                                                  Routine           

 

     glucagon            Yes            1mg       1 mg,           [facility]



     (GLUCAGEN                                     Intramuscu           ity 

of



     DIAGNOSTIC      12:22:                               lar, PRN,           [facility]

[place]



     KIT)      35                                 Starting           Medical



     injection 1                                         on Sat           [facility]



     mg                                           22 at           



                                                  0722,           



                                                  Until           



                                                  Discontinu           



                                                  ed, ASAP,           



                                                  Blood           



                                                  Glucose           



                                                  &amp;lt;           



                                                  or = 70           



                                                  mg/dL and           



                                                  patient is           



                                                  unable to           



                                                  swallow or           



                                                  has mental           



                                                  changes.           

 

     dextrose 50            Yes            25mL      25 mL,           Univ

ers



     % in water                                     Slow IV           ity of



     (D50W)      12:22:                               Push, PRN,           [place]



     injection      35                                 Starting           Medica

l



     25 mL                                         on Sat           [facility]



                                                  22 at           



                                                  0722,           



                                                  Until           



                                                  Discontinu           



                                                  ed, ASAP,           



                                                  Blood           



                                                  Glucose           



                                                  &amp;lt;           



                                                  or = 70           



                                                  mg/dL and           



                                                  patient is           



                                                  unable to           



                                                  swallow or           



                                                  has mental           



                                                  status           



                                                  changes.           

 

     glucagon            Yes            1mg       1 mg,           [facility]



     (GLUCAGEN                                     Intramuscu           ity 

of



     DIAGNOSTIC      12:22:                               lar, PRN,           [facility]

[place]



     KIT)      35                                 Starting           Medical



     injection 1                                         on Sat           [facility]



     mg                                           22 at           



                                                  0722,           



                                                  Until           



                                                  Discontinu           



                                                  ed, ASAP,           



                                                  Blood           



                                                  Glucose           



                                                  &amp;lt;           



                                                  or = 70           



                                                  mg/dL and           



                                                  patient is           



                                                  unable to           



                                                  swallow or           



                                                  has mental           



                                                  changes.           

 

     dextrose 50            Yes            25mL      25 mL,           Univ

ers



     % in water                                     Slow IV           ity of



     (D50W)      12:22:                               Push, PRN,           [place]



     injection      35                                 Starting           Medica

l



     25 mL                                         on Sat           [facility]



                                                  22 at           



                                                  0722,           



                                                  Until           



                                                  Discontinu           



                                                  ed, ASAP,           



                                                  Blood           



                                                  Glucose           



                                                  &amp;lt;           



                                                  or = 70           



                                                  mg/dL and           



                                                  patient is           



                                                  unable to           



                                                  swallow or           



                                                  has mental           



                                                  status           



                                                  changes.           

 

     HYDROmorpho      2022- No             .2mg      0.2 mg,           Un

[facility]



     ne         08-                          Slow IV           ity of



     (DILAUDID)      01:49: 02:17                          Push, PRN,           

[place]



     injection      34   :00                           1 dose,           Medical



     0.2 mg                                         Starting           [facility]



                                                  on Fri           



                                                  22 at           



                                                  2049,           



                                                  Until Fri           



                                                  22 at           



                                                  ,           



                                                  Routine,           



                                                  Pain           



                                                  (scale           



                                                  7-10)<br>U           



                                                  se             



                                                  approved           



                                                  by             



                                                  (Faculty):           



                                                  [facility]           



                                                  CARE UNIT           

 

     KCL 20      2022- No             40meq      40 mEq,           Univer

s



     mEq/15 mL                                Oral,           ity of



     solution 40      23:30: 22:58                          ONCE, 1           [facility]

[place]



     mEq       00   :00                           dose, On           Medical



                                                  Fri 22           [facility]



                                                  at 1830,           



                                                  Routine           

 

     Sliding      2022- No                       Subcutaneo           Uni

vers



     Scale      06                          us, Q4H,           ity of



     Insulin -      22:15: 12:23                          First dose           [facility]

[place]



     Lispro      00   :48                           on Fri           Medical



     (HumaLOG) +                                         22 at           [facility]



     Fsbg                                         1715,           



     Testing                                         Until           



                                                  Discontinu           



                                                  ed,            



                                                  Routine           

 

     insulin NPH      2022- No             22U       22 Units,           

[facility]



     (HUMULIN N)                                Subcutaneo           i

ty of



     injection      22:00: 19:55                          us, QPM,           Eric

as



     22 Units      00   :31                           First dose           Medic

al



                                                  (after           [facility]



                                                  last           



                                                  modificati           



                                                  on) on 22 at           



                                                  1700,           



                                                  Until           



                                                  Discontinu           



                                                  ed,            



                                                  Routine           

 

     magnesium      2022- No             4g        4 g, IV           Univ

ers



     sulfate in                                Piggyback,           it

y of



     water 4      19:15: 20:22                          ONCE, 1           [place]



     gram/50 mL      00   :00                           dose, On           Medic

al



     (8 %) IV                                         22           [facility]

h



     Piggyback 4                                         at 1415,           



     g                                            Routine           

 

     HYDROmorpho       No             .2mg      0.2 mg,           Un

[place]



     ne                                  Slow IV           ity of



     (DILAUDID)      18:00: 18:07                          Push,           [place]



     injection      00   :00                           ONCE, 1           Medical



     0.2 mg                                         dose, On           [facility]



                                                  22           



                                                  at 1300,           



                                                  Routine<br           



                                                  >Use           



                                                  approved           



                                                  by             



                                                  (Faculty):           



                                                  [facility]           



                                                  CARE UNIT           

 

     insulin      2022- No             6U        6 Units,           Unive

rs



     lispro                                Subcutaneo           ity of



     (human)      14:00: 00:41                          us, TIDPC,           [facility]

as



     (HumaLOG      00   :23                           First dose           Medic

al



     U-100)                                         (after           [facility]



     injection 6                                         last           



     Units                                         modificati           



                                                  on) on 22 at           



                                                  0900,           



                                                  Until           



                                                  Discontinu           



                                                  ed,            



                                                  Routine           

 

     insulin NPH      2022- No             22U       22 Units,           

[facility]



     (HUMULIN N)                                Subcutaneo           i

ty of



     injection      14:00: 21:37                          us, QAM,           [facility]

as



     22 Units      00   :09                           First dose           Medic

al



                                                  on 22 at           



                                                  0900,           



                                                  Until           



                                                  Discontinu           



                                                  ed,            



                                                  Routine           

 

     HYDROmorpho      2022- No             .2mg      0.2 mg,           Un

[place]



     ne                                  Slow IV           ity of



     (DILAUDID)      08:15: 07:57                          Push,           [place]



     injection      00   :00                           ONCE, 1           Medical



     0.2 mg                                         dose, On           [facility]



                                                  22           



                                                  at 0315,           



                                                  Routine<br           



                                                  >Use           



                                                  approved           



                                                  by             



                                                  (Faculty):           



                                                  [facility]           



                                                  CARE UNIT           

 

     HYDROcodone      2022- No             1{tbl}      1 tablet,         

  [facility]



     -acetaminop                                Oral,           ity of



     hen (NORCO)      01:32: 17:25                          Q6HPRN,           [facility]

[place]



      mg      23   :31                           Starting           Medica

l



     tablet 1                                         on Thu           [facility]



     tablet                                         22 at           



                                                  2032,           



                                                  Until Sat           



                                                  22 at           



                                                  1225,           



                                                  Routine,           



                                                  Pain           



                                                  (scale           



                                                  7-10)           

 

     Sliding                             Subcutaneo           Uni

vers



     Scale                                us, Q4H,           ity of



     Insulin -      01:00: 21:37                          First dose           [facility]

[place]



     lispro      00   :09                           on Thu           Medical



     (humaLOG) +                                         22 at           [facility]



     Fsbg                                         ,           



     Testing                                         Until           



                                                  Discontinu           



                                                  ed,            



                                                  Routine           

 

     meropenem      2022- No             1000mg      1,000 mg,           

[facility]



     (MERREM)                                IV             ity of



     1,000 mg in      00:15: 02:14                          [facility]          

 [place]



     NaCl 0.9%      00   :44                           Q8H ABX,           Medica

l



     (NS) 50 mL                                         21 doses,           [facility]

ch



     MINI-BAG                                         First dose           



                                                  on u           



                                                  22 at           



                                                  1915, Last           



                                                  dose on           



                                                  u            



                                                  22 at           



                                                  1115,           



                                                  Administer           



                                                  over 3           



                                                  Hours, 50           



                                                  mL<br>Rest           



                                                  ricted use           



                                                  approved           



                                                  by: POP           



                                                  8TH            



                                                  FLOOR<br>R           



                                                  [place] for           



                                                  Anti-Infec           



                                                  tive:           



                                                  Documented           



                                                  Infection<           



                                                  br>Documen           



                                                  huma            



                                                  Infection           



                                                  Site:           



                                                  Urine<br>D           



                                                  uration of           



                                                  Therapy: 7           



                                                  days           

 

     HYDROmorpho      2022- No             .2mg      0.2 mg,           Un

[place]



     ne                                  Slow IV           ity of



     (DILAUDID)      00:00: 23:13                          Push,           [place]



     injection      00   :00                           ONCE, 1           Medical



     0.2 mg                                         dose, On           [facility]



                                                  Thu 22           



                                                  at 1900,           



                                                  Routine<br           



                                                  >Use           



                                                  approved           



                                                  by             



                                                  (Faculty):           



                                                  [facility]ENS[facility]           



                                                  CARE UNIT           

 

     HYDROmorpho      2022- No             .2mg      0.2 mg,           Un

[place]



     ne                                  Slow IV           ity of



     (DILAUDID)      19:30: 18:59                          Push,           [place]



     injection      00   :00                           ONCE, 1           Medical



     0.2 mg                                         dose, On           [facility]



                                                  Thu 22           



                                                  at 1430,           



                                                  Routine<br           



                                                  >Use           



                                                  approved           



                                                  by             



                                                  (Faculty):           



                                                  INTENSIVE           



                                                  CARE UNIT           

 

     cholestyram      2022- No                       Topical           Un

[facility]elyn



     ine-nystati                                (Apply To           it

y of



     n-zinc      18:29: 12:46                          Affected           [place]



     oxide      12   :35                           Areas),           Medical



     ointment                                         PRN,           Branch



     1:1:1                                         Starting           



     (COMPOUNDED                                         on u           



     )                                            22 at           



                                                  1329,           



                                                  Until Mon           



                                                  22 at           



                                                  0746,           



                                                  Routine,           



                                                  Wound           



                                                  care,           



                                                  Cuts,           



                                                  abrasions,           



                                                  and skin           



                                                  ulcers           

 

     iopamidol      2022- No        [id number] 60mL      60 mL,           

[facility]



     (ISOVUE                                Intravenou           ity o

f



     370-500 mL)      17:25: 05:25                          s, ONCE, 1          

 [place]



     injection      00   :00                           dose, On           Medica

l



     60 mL                                         Thu 22           Branch



                                                  at 1230,           



                                                  Routine           

 

     meropenem      2022- No             1000mg      1,000 mg,           

[facility]



     (MERREM)                                IV             ity of



     1,000 mg in      16:45: 20:14                          Piggyback,          

 [place]



     NaCl 0.9%      00   :00                           ONCE, 1           Medical



     (NS) 50 mL                                         dose, On           Branc

h



     MINI-BAG                                         Thu 22           



                                                  at 1145,           



                                                  Administer           



                                                  over 30           



                                                  Minutes,           



                                                  50             



                                                  mL<br&gt;R           



                                                  estricted           



                                                  use            



                                                  approved           



                                                  by: POP           



                                                  8TH            



                                                  FLOOR<br>R           



                                                  elmria for           



                                                  Anti-Infec           



                                                  tive:           



                                                  Documented           



                                                  Infection<           



                                                  br>Documen           



                                                  huma            



                                                  Infection           



                                                  Site:           



                                                  Urine<br>D           



                                                  uration of           



                                                  Therapy: 7           



                                                  days           

 

     pantoprazol            Yes            40mg      40 mg,           Univ

ers



     e         8                               Oral,           ity of



     (PROTONIX)      14:00:                               DAILY,           [place]



     EC tablet      00                                 First dose           Medi

sarah



     40 mg                                         on Thu           Branch



                                                  22 at           



                                                  0900,           



                                                  Until           



                                                  Discontinu           



                                                  ed,            



                                                  Routine<br           



                                                  >Indicatio           



                                                  n for use:           



                                                  None of           



                                                  the above           

 

     pantoprazol      0      Yes            40mg      40 mg,           Univ

ers



     e         8                               Oral,           ity of



     (PROTONIX)      14:00:                               DAILY,           [place]



     EC tablet      00                                 First dose           Medi

sarah



     40 mg                                         on u           Branch



                                                  22 at           



                                                  0900,           



                                                  Until           



                                                  Discontinu           



                                                  ed,            



                                                  Routine<br           



                                                  >Indicatio           



                                                  n for use:           



                                                  None of           



                                                  the above           

 

     heparin            Yes            5000U      5,000           [facility]



     (porcine)                                     Units,           ity of



     injection      13:00:                               Subcutaneo           [place]

[place]



     5,000 Units      00                                 us, Q12H,           Med

ical



                                                  First dose           Branch



                                                  on u           



                                                  22 at           



                                                  0800,           



                                                  Until           



                                                  Discontinu           



                                                  ed,            



                                                  Routine           

 

     heparin       No             5000U      5,000           [facility]



     (porcine)       08-10                          Units,           ity of



     injection      13:00: 21:57                          Subcutaneo           T

[place]



     5,000 Units      00   :33                           us, Q12H,           Med

ical



                                                  First dose           [facility]



                                                  on u           



                                                  22 at           



                                                  0800,           



                                                  Until           



                                                  Discontinu           



                                                  ed,            



                                                  Routine           

 

     NaCl 0.9%      0      Yes            10mL      10 mL,           Univer

s



     (NS)                                     Slow IV           ity of



     injection      11:28:                               Push, PRN,           Te

[place]



     10 mL      11                                 Starting           Medical



                                                  on u           Branch



                                                  22 at           



                                                  0628,           



                                                  Until           



                                                  Discontinu           



                                                  ed,            



                                                  Routine,           



                                                  line           



                                                  maintenanc           



                                                  e              

 

     lidocaine            Yes            5mL       5 mL,           [facility]



     1% (PF)                                     Subcutaneo           ity of



     (XYLOCAINE)      11:28:                               us, PRN,           Te

[place]



     injection 5      11                                 Starting           Medi

sarah



     mL                                           on Thu           [facility]anch



                                                  22 at           



                                                  0628,           



                                                  Until           



                                                  Discontinu           



                                                  ed,            



                                                  Routine,           



                                                  Local           



                                                  anesthesia           

 

     NaCl 0.9%      0      Yes            10mL      10 mL,           Univer

s



     (NS)                                     Slow IV           ity of



     injection      11:28:                               Push, PRN,           Te

[place]



     10 mL      11                                 Starting           Medical



                                                  on u           Branch



                                                  22 at           



                                                  0628,           



                                                  Until           



                                                  Discontinu           



                                                  ed,            



                                                  Routine,           



                                                  line           



                                                  maintenanc           



                                                  e              

 

     lidocaine      0      Yes            5mL       5 mL,           [facility]



     1% (PF)                                     Subcutaneo           ity of



     (XYLOCAINE)      11:28:                               us, PRN,           Te

[place]



     injection 5      11                                 Starting           Medi

sarah



     mL                                           on Thu           Branch



                                                  22 at           



                                                  0628,           



                                                  Until           



                                                  Discontinu           



                                                  ed,            



                                                  Routine,           



                                                  Local           



                                                  anesthesia           

 

     NaCl 0.9%      2022- No             1000mL      at 999           Uni

vers



     (NS) bolus       08-04                          mL/hr,           ity of



     infusion      07:30: 06:55                          1,000 mL,           Eric

as



     1,000 mL      00   :00                           IV             Medical



                                                  Infusion,           [facility]anch



                                                  ONCE, 1           



                                                  dose, On           



                                                  Thu 22           



                                                  at 0230,           



                                                  ASAP           

 

     FENTanyl PF      2022- No             50ug      50 mcg,           Un

[facility]



     (SUBLIMAZE                                Slow IV           ity o

f



     (PF))      06:45: 06:01                          Push,           [place]



     injection      00   :00                           ONCE, 1           Medical



     50 mcg                                         dose, On           Branch



                                                  u 22           



                                                  at 0145,           



                                                  Routine           

 

     cefTRIAXone      2022- No             1000mg      1,000 mg,         

  [facility]



     (ROCEPHIN)                                IV             ity of



     1,000 mg in      05:30: 06:00                          Piggyback,          

 [place]



     NaCl 0.9%      00   :00                           ONCE, 1           Medical



     (NS) 50 mL                                         dose, On           [facility]

h



     MINI-BAG                                         Thu 22           



                                                  at 0030,           



                                                  Administer           



                                                  over 30           



                                                  Minutes,           



                                                  50             



                                                  mL<br&gt;R           



                                                  elmira for           



                                                  Anti-Infec           



                                                  tive:           



                                                  Empiric           



                                                  Therapy           



                                                  for            



                                                  Suspected           



                                                  Infection<           



                                                  br>Empiric           



                                                  Therapy           



                                                  Site:           



                                                  Urine<br>D           



                                                  uration of           



                                                  therapy:           



                                                  72 hours           

 

     NaCl 0.9%      2022- No             1000mL      at 999           Uni

vers



     (NS) bolus                                mL/hr,           ity of



     infusion      05:30: 07:58                          1,000 mL,           E[facility]

as



     1,000 mL      00   :00                           IV             Medical



                                                  Infusion,           Branch



                                                  ONCE, 1           



                                                  dose, On           



                                                  u 22           



                                                  at 0030,           



                                                  ASAP           

 

     NaCl 0.9%      2022- No             1000mL      at 999           Uni

vers



     (NS) bolus                                mL/hr,           ity of



     infusion      05:15: 07:58                          1,000 mL,           Eric

as



     1,000 mL      00   :00                           IV             Medical



                                                  Infusion,           Branch



                                                  ONCE, 1           



                                                  dose, On           



                                                  u 22           



                                                  at 0015,           



                                                  ASAP           

 

     D5W 0.45%      2022- No                       IV             [facility]



     NaCl                                Infusion,           ity of



     (1/2NS) 1 L      04:53: 22:22                          at 200           Eric

as



     + KCL 20      49   :19                           mL/hr, PRN           Medic

al



     mEq                                          - SEE           Branch



                                                  INSTRUCTIO           



                                                  NS,            



                                                  Starting           



                                                  on Wed           



                                                  8/3/22 at           



                                                  2353,           



                                                  Until 22 at           



                                                  1722,           



                                                  ASAP,           



                                                  Blood           



                                                  glucose           



                                                  control           

 

     proMETHazin      2022- No             25mg      25 mg, IV           

Univers



     e                                   Piggyback,           ity of



     (PHENERGAN)      04:15: 04:22                          ONCE, 1           Te

[facility]



     25 mg in      00   :00                           dose, On           Medical



     NaCl 0.9%                                         Wed 8/3/22           Bran

ch



     (NS) 50 mL                                         at 2315,           



     IV                                           ASAP           



     piggyback                                                        

 

     proMETHazin      2022- No             12.5mg      12.5 mg,          

 [facility]



     e         7-30 07-30                          IV             ity of



     (PHENERGAN)      21:30: 22:14                          Piggyback,          

 [place]



     12.5 mg in      00   :00                           ONCE, 1           Medica

l



     NaCl 0.9%                                         dose, On           Branch



     (NS) 50 mL                                         Sat            



     IV                                           22 at           



     piggyback                                         1630, 50           



                                                  mL             

 

     lactated            Yes            1000mL      at 50           Univer

s



     ringers IV      7-30                               mL/hr,           ity of



     infusion      19:15:                               1,000 mL,           [place]

[facility]



     1,000 mL      00                                 IV             Medical



                                                  Infusion,           Branch



                                                  CONTINUOUS           



                                                  , Starting           



                                                  on Sat           



                                                  22 at           



                                                  1415,           



                                                  Until           



                                                  Discontinu           



                                                  ed,            



                                                  Routine           

 

     insulin            Yes            7U        7 Units,           Univer

s



     lispro      7-30                               Subcutaneo           ity of



     (human)      17:00:                               us, TID           [place]



     (HumaLOG      00                                 MEALS,           Medical



     U-100)                                         First dose           Branch



     injection 7                                         (after           



     Units                                         last           



                                                  modificati           



                                                  on) on Sat           



                                                  22 at           



                                                  1200,           



                                                  Until           



                                                  Discontinu           



                                                  ed,            



                                                  Routine           

 

     insulin            Yes            40U       40 Units,           Unive

rs



     glargine      7-30                               Subcutaneo           ity o

f



     (LANTUS      14:00:                               us, DAILY,           [place]

[place]



     U-100)      00                                 First dose           Medical



     injection                                         (after           Branch



     40 Units                                         last           



                                                  modificati           



                                                  on) on Sat           



                                                  22 at           



                                                  0900,           



                                                  Until           



                                                  Discontinu           



                                                  ed,            



                                                  Routine           

 

     insulin NPH      2022- No        [id number] 50U       inject 50      

     [facility]



     and regular      7-30 08-30                          Units           ity of



     human 70-30      00:00: 04:59                          under the           

[place]



     100 unit/mL      00   :00                           skin every           Me

dical



     (70-30)                                         morning           Branch



     injection                                         and            



                                                  evening           



                                                  for 30           



                                                  days.           

 

     proMETHazin      2022- No        [id number] 12.5mg      Insert 1     

      [facility]



     e 12.5 mg       08-30                          Suppositor           ity

 of



     suppository      00:00: 04:59                          y into           [facility]

as



               00   :00                           rectum           Medical



                                                  every 6           [facility]



                                                  (six)           



                                                  hours as           



                                                  needed for           



                                                  Nausea and           



                                                  Vomiting           



                                                  (N/V) for           



                                                  up to 30           



                                                  days.           

 

     proMETHazin      2022- No        [id number] 12.5mg      Take 0.5     

      [facility]



     e 25 mg      7-30 08-30                          tablets by           ity o

f



     tablet      00:00: 04:59                          mouth           [place]



               00   :00                           every 4           Medical



                                                  (four)           Branch



                                                  hours as           



                                                  needed for           



                                                  Nausea and           



                                                  Vomiting           



                                                  (N/V) for           



                                                  up to 30           



                                                  days.           

 

     insulin NPH      2022- No        [id number] 50U       inject 50      

     [facility]



     and regular      7-30 08-30                          Units           ity of



     human 70-30      00:00: 04:59                          under the           

[place]



     100 unit/mL      00   :00                           skin every           Me

dical



     (70-30)                                         morning           Branch



     injection                                         and            



                                                  evening           



                                                  for 30           



                                                  days.           

 

     proMETHazin      2022- No        [id number] 12.5mg      Insert 1     

      [facility]



     e 12.5 mg      7-30 08-30                          Suppositor           ity

 of



     suppository      00:00: 04:59                          y into           [facility]

[facility]



               00   :00                           rectum           Medical



                                                  every 6           [facility]anch



                                                  (six)           



                                                  hours as           



                                                  needed for           



                                                  Nausea and           



                                                  Vomiting           



                                                  (N/V) for           



                                                  up to 30           



                                                  days.           

 

     proMETHazin      2022- No        [id number] 12.5mg      Take 0.5     

      [facility]



     e 25 mg      7-30 08-30                          tablets by           ity o

f



     tablet      00:00: 04:59                          mouth           [place]



               00   :00                           every 4           Medical



                                                  (four)           Branch



                                                  hours as           



                                                  needed for           



                                                  Nausea and           



                                                  Vomiting           



                                                  (N/V) for           



                                                  up to 30           



                                                  days.           

 

     insulin NPH      2022- No        [id number] 50U       inject 50      

     [facility]



     and regular      7-30 08-30                          Units           ity of



     human 70-30      00:00: 04:59                          under the           

[place]



     100 unit/mL      00   :00                           skin every           Me

dical



     (70-30)                                         morning           Branch



     injection                                         and            



                                                  evening           



                                                  for 30           



                                                  days.           

 

     proMETHazin      2022- No        [id number] 12.5mg      Insert 1     

      [facility]



     e 12.5 mg      7-30 08-30                          Suppositor           ity

 of



     suppository      00:00: 04:59                          y into           [facility]

[facility]



               00   :00                           rectum           Medical



                                                  every 6           [facility]



                                                  (six)           



                                                  hours as           



                                                  needed for           



                                                  Nausea and           



                                                  Vomiting           



                                                  (N/V) for           



                                                  up to 30           



                                                  days.           

 

     proMETHazin      2022- No        [id number] 12.5mg      Take 0.5     

      [facility]



     e 25 mg      7-30 08-30                          tablets by           ity o

f



     tablet      00:00: 04:59                          mouth           [place]



               00   :00                           every 4           Medical



                                                  (four)           Branch



                                                  hours as           



                                                  needed for           



                                                  Nausea and           



                                                  Vomiting           



                                                  (N/V) for           



                                                  up to 30           



                                                  days.           

 

     insulin NPH      2022- No        [id number] 50U       inject 50      

     [facility]



     and regular      7-30 08-30                          Units           ity of



     human 70-30      00:00: 04:59                          under the           

[place]



     100 unit/mL      00   :00                           skin every           Me

dical



     (70-30)                                         morning           Branch



     injection                                         and            



                                                  evening           



                                                  for 30           



                                                  days.           

 

     proMETHazin      2022- No        [id number] 12.5mg      Insert 1     

      [facility]



     e 12.5 mg      7-30 08-30                          Suppositor           ity

 of



     suppository      00:00: 04:59                          y into           [facility]

[facility]



               00   :00                           rectum           Medical



                                                  every 6           [facility]



                                                  (six)           



                                                  hours as           



                                                  needed for           



                                                  Nausea and           



                                                  Vomiting           



                                                  (N/V) for           



                                                  up to 30           



                                                  days.           

 

     proMETHazin      2022- No        [id number] 12.5mg      Take 0.5     

      [facility]



     e 25 mg      7-30 08-30                          tablets by           ity o

f



     tablet      00:00: 04:59                          mouth           [place]



               00   :00                           every 4           Medical



                                                  (four)           Branch



                                                  hours as           



                                                  needed for           



                                                  Nausea and           



                                                  Vomiting           



                                                  (N/V) for           



                                                  up to 30           



                                                  days.           

 

     insulin NPH      2022- No        [id number] 50U       inject 50      

     [facility]



     and regular      7-30 08-30                          Units           ity of



     human 70-30      00:00: 04:59                          under the           

[place]



     100 unit/mL      00   :00                           skin every           Me

dical



     (70-30)                                         morning           Branch



     injection                                         and            



                                                  evening           



                                                  for 30           



                                                  days.           

 

     proMETHazin      2022- No        [id number] 12.5mg      Insert 1     

      [facility]



     e 12.5 mg      7-30 08-30                          Suppositor           ity

 of



     suppository      00:00: 04:59                          y into           [facility]

[facility]



               00   :00                           rectum           Medical



                                                  every 6           [facility]



                                                  (six)           



                                                  hours as           



                                                  needed for           



                                                  Nausea and           



                                                  Vomiting           



                                                  (N/V) for           



                                                  up to 30           



                                                  days.           

 

     proMETHazin      2022- No        [id number] 12.5mg      Take 0.5     

      [facility]



     e 25 mg      7-30 08-30                          tablets by           ity o

f



     tablet      00:00: 04:59                          mouth           [place]



               00   :00                           every 4           Medical



                                                  (four)           Branch



                                                  hours as           



                                                  needed for           



                                                  Nausea and           



                                                  Vomiting           



                                                  (N/V) for           



                                                  up to 30           



                                                  days.           

 

     proMETHazin      0 - No        [id number] 12.5mg      Insert 1     

      [facility]



     e 12.5 mg      7-30 08-30                          Suppositor           ity

 of



     suppository      00:00: 04:59                          y into           [facility]

[facility]



               00   :00                           rectum           Medical



                                                  every 6           [facility]



                                                  (six)           



                                                  hours as           



                                                  needed for           



                                                  Nausea and           



                                                  Vomiting           



                                                  (N/V) for           



                                                  up to 30           



                                                  days.           

 

     proMETHazin      -2022- No        [id number] 12.5mg      Take 0.5     

      [facility]



     e 25 mg      7-30 08-30                          tablets by           ity o

f



     tablet      00:00: 04:59                          mouth           [place]



               00   :00                           every 4           Medical



                                                  (four)           Branch



                                                  hours as           



                                                  needed for           



                                                  Nausea and           



                                                  Vomiting           



                                                  (N/V) for           



                                                  up to 30           



                                                  days.           

 

     proMETHazin      2022- No        [id number] 12.5mg      Insert 1     

      [facility]



     e 12.5 mg      7-30 08-30                          Suppositor           ity

 of



     suppository      00:00: 04:59                          y into           [facility]

[facility]



               00   :00                           rectum           Medical



                                                  every 6           [facility]



                                                  (six)           



                                                  hours as           



                                                  needed for           



                                                  Nausea and           



                                                  Vomiting           



                                                  (N/V) for           



                                                  up to 30           



                                                  days.           

 

     proMETHazin      -2022- No        [id number] 12.5mg      Take 0.5     

      [facility]



     e 25 mg      7-30 08-30                          tablets by           ity o

f



     tablet      00:00: 04:59                          mouth           [place]



               00   :00                           every 4           Medical



                                                  (four)           Branch



                                                  hours as           



                                                  needed for           



                                                  Nausea and           



                                                  Vomiting           



                                                  (N/V) for           



                                                  up to 30           



                                                  days.           

 

     proMETHazin      0 - No        [id number] 12.5mg      Insert 1     

      [facility]



     e 12.5 mg      7-30 08-30                          Suppositor           ity

 of



     suppository      00:00: 04:59                          y into           [facility]

[facility]



               00   :00                           rectum           Medical



                                                  every 6           [facility]



                                                  (six)           



                                                  hours as           



                                                  needed for           



                                                  Nausea and           



                                                  Vomiting           



                                                  (N/V) for           



                                                  up to 30           



                                                  days.           

 

     proMETHazin      -0 2- No        [id number] 12.5mg      Take 0.5     

      [facility]



     e 25 mg      7-30 08-30                          tablets by           ity o

f



     tablet      00:00: 04:59                          mouth           [place]



               00   :00                           every 4           Medical



                                                  (four)           Branch



                                                  hours as           



                                                  needed for           



                                                  Nausea and           



                                                  Vomiting           



                                                  (N/V) for           



                                                  up to 30           



                                                  days.           

 

     proMETHazin      2022-0 2022- No        [id number] 12.5mg      Insert 1     

      [facility]



     e 12.5 mg                                Suppositor           [facility]

 of



     suppository      00:00: 04:59                          y into           [facility]

[facility]



               00   :00                           rectum           Medical



                                                  every 6           Branch



                                                  (six)           



                                                  hours as           



                                                  needed for           



                                                  Nausea and           



                                                  Vomiting           



                                                  (N/V) for           



                                                  up to 30           



                                                  days.           

 

     proMETHazin      2022- No        [id number] 12.5mg      Take 0.5     

      [facility]



     e 25 mg                                tablets by           ity o

f



     tablet      00:00: 04:59                          mouth           [place]



               00   :00                           every 4           Medical



                                                  (four)           Branch



                                                  hours as           



                                                  needed for           



                                                  Nausea and           



                                                  Vomiting           



                                                  (N/V) for           



                                                  up to 30           



                                                  days.           

 

     insulin NPH      2022- No        [id number] 50U       inject 50      

     [facility]



     and regular       08-12                          Units           ity of



     human 70-30      00:00: 00:00                          under the           

[place]



     100 unit/mL      00   :00                           skin every           Me

dical



     (70-30)                                         morning           Branch



     injection                                         and            



                                                  evening           



                                                  for 30           



                                                  days.           

 

     insulin      2022- No             5U        5 Units,           Unive

rs



     lispro                                Subcutaneo           ity of



     (human)      17:00: 14:45                          us, TID           [place]



     (HumaLOG      00   :04                           MEALS,           Medical



     U-100)                                         First dose           Branch



     injection 5                                         (after           



     Units                                         last           



                                                  modificati           



                                                  on) on 22 at           



                                                  1200,           



                                                  Until           



                                                  Discontinu           



                                                  ed,            



                                                  Routine           

 

     HYDROmorphO      2022- No             1mg       1 mg,           Univ

ers



     ne                                  Intravenou           ity of



     (DILAUDID)      16:54: 16:53                          s, Q6HPRN,           

[place]



     injection 1      28   :28                           Starting           Medi

sarah



     mg                                           on Fri           Branch



                                                  22 at           



                                                  1154,           



                                                  Until Sun           



                                                  22 at           



                                                  1153,           



                                                  Routine,           



                                                  [place]           



                                                  gh pain           



                                                  only<br>Us           



                                                  e approved           



                                                  by             



                                                  (Faculty):           



                                                  [facility]            



                                                  PROVIDER           

 

     insulin      2022- No             35U       35 Units,           Univ

ers



     glargine                                Subcutaneo           ity 

of



     (LANTUS      14:00: 14:02                          us, DAILY,           [facility]

as



     U-100)      00   :21                           First dose           Medical



     injection                                         (after           [facility]anch



     35 Units                                         last           



                                                  modificati           



                                                  on) on 22 at           



                                                  0900,           



                                                  Until           



                                                  Discontinu           



                                                  ed,            



                                                  Routine           

 

     NaCl 0.9%            Yes            10mL      10 mL,           Univer

s



     (NS)                                     Slow IV           ity of



     injection      01:04:                               Push, PRN,           [facility]

[place]



     10 mL      34                                 Starting           Medical



                                                  on u           Branch



                                                  22 at           



                                                  2004,           



                                                  Until           



                                                  Discontinu           



                                                  ed,            



                                                  Routine,           



                                                  line           



                                                  maintenanc           



                                                  e              

 

     lidocaine            Yes            5mL       5 mL,           [facility]



     1% (PF)                                     Subcutaneo           ity of



     (XYLOCAINE)      01:04:                               us, PRN,           [facility]

[place]



     injection 5      34                                 Starting           Medi

[facility]



     mL                                           on u           Branch



                                                  22 at           



                                                  2004,           



                                                  Until           



                                                  Discontinu           



                                                  ed,            



                                                  Routine,           



                                                  Local           



                                                  anesthesia           

 

     nystatin            Yes                      Topical,           Unive

rs



     (NYSTOP)                                     BID, First           ity o

f



     powder      01:00:                               dose on           [place]



               00                                 Thu            Medical



                                                  22 at           Branch



                                                  2000,           



                                                  Until           



                                                  Discontinu           



                                                  ed,            



                                                  Routine           

 

     Sliding            Yes                      Subcutaneo           Univ

ers



     Scale                                     us, TID           ity of



     Insulin -      22:00:                               MEALS+HS,           Eric

as



     Lispro      00                                 First dose           Medical



     (HumaLOG) +                                         (after           Branch



     Fsbg                                         last           



     Testing                                         modificati           



                                                  on) on 22 at           



                                                  1700,           



                                                  Until           



                                                  Discontinu           



                                                  ed,            



                                                  Routine           

 

     insulin      2022- No             3U        3 Units,           Unive

rs



     lispro                                Subcutaneo           ity of



     (human)      22:00: 14:02                          , TID           [place]



     (HumaLOG      00   :21                           MEALS,           Medical



     U-100)                                         First dose           Branch



     injection 3                                         on Thu           



     Units                                         22 at           



                                                  1700,           



                                                  Until           



                                                  Discontinu           



                                                  ed,            



                                                  Routine           

 

     mupirocin            Yes                                     [facility]



     (BACTROBAN                                                    ity of



     OINT) 2 %      21:45:                                              [place]



     skin      00                                                Medical



     ointment                                                        Branch

 

     collagenase            Yes                      Topical           Uni

vers



     (SANTYL)                                     (Apply To           ity of



     ointment      21:45:                               Affected           [place]



               00                                 Areas),           Medical



                                                  DAILY,           Branch



                                                  First dose           



                                                  (after           



                                                  last           



                                                  modificati           



                                                  on) on 22 at           



                                                  1645,           



                                                  Until           



                                                  Discontinu           



                                                  ed,            



                                                  Routine           

 

     HYDROcodone      -0      Yes            1{tbl}      1 tablet,          

 [facility]



     -acetaminop                                     Oral,           ity of



     hen (NORCO      19:54:                               Q6HPRN,           [place]

[place]



     5) 5-325 mg      07                                 Starting           Medi

[facility]



     tablet 1                                         on Thu           Branch



     tablet                                         22 at           



                                                  1454,           



                                                  Until           



                                                  Discontinu           



                                                  ed,            



                                                  Routine,           



                                                  Pain           



                                                  (scale           



                                                  4-6)           

 

     HYDROcodone            Yes            1{tbl}      1 tablet,          

 [facility]



     -acetaminop                                     Oral,           ity of



     hen (NORCO)      19:47:                               Q6HPRN,           [facility]

as



      mg      33                                 Starting           Medica

l



     tablet 1                                         on Thu           Branch



     tablet                                         22 at           



                                                  1447,           



                                                  Until           



                                                  Discontinu           



                                                  ed,            



                                                  Routine,           



                                                  Pain           



                                                  (scale           



                                                  7-10)           

 

     lactated      2022- No             1000mL      at 100           Univ

ers



     ringers IV                                mL/hr,           ity of



     infusion      18:30: 19:10                          1,000 mL,           [facility]

as



     1,000 mL      00   :52                           IV             Medical



                                                  Infusion,           Branch



                                                  CONTINUOUS           



                                                  , Starting           



                                                  on 22 at           



                                                  1330,           



                                                  Until Sat           



                                                  22 at           



                                                  1410,           



                                                  Routine           

 

     fluconazole      2022- No             200mg      200 mg,           U

nivers



     (DIFLUCAN)                                Oral,           ity of



     tablet 200      14:00: 19:47                          DAILY,           [place]

[place]



     mg        00   :17                           First dose           Medical



                                                  on Thu           Branch



                                                  22 at           



                                                  0900,           



                                                  Until           



                                                  Discontinu           



                                                  ed,            



                                                  ASAP<br>Re           



                                                  ason for           



                                                  Anti-Infec           



                                                  tive:           



                                                  Empiric           



                                                  Therapy           



                                                  for            



                                                  Suspected           



                                                  Infection<           



                                                  br>Empiric           



                                                  Therapy           



                                                  Site:           



                                                  Urine<br&g           



                                                  t;Duration           



                                                  of             



                                                  therapy:           



                                                  72 hours           

 

     NaCl 0.45%      2022- No             1000mL      at 150           Un

[facility]



     (1/2NS) IV                                mL/hr,           ity of



     infusion      02:15: 13:47                          1,000 mL,           Eric

as



     1,000 mL      00   :19                           IV             Medical



                                                  Infusion,           Branch



                                                  CONTINUOUS           



                                                  , Starting           



                                                  on 22 at           



                                                  2115,           



                                                  Until 22 at           



                                                  0847,           



                                                  Routine           

 

     proMETHazin      2022- No             12.5mg      12.5 mg,          

 [facility]



     e                                   IV             ity of



     (PHENERGAN)      02:15: 01:35                          Piggyback,          

 [place]



     12.5 mg in      00   :00                           ONCE, 1           Medica

l



     NaCl 0.9%                                         dose, On           Branch



     (NS) 50 mL                                         Wed            



     IV                                           22 at           



     piggyback                                         ,           



                                                  Routine           

 

     insulin      2022- No             10U       10 Units,           Univ

ers



     lispro                                Subcutaneo           ity of



     (human)      01:30: 00:44                          us, ONCE,           [place]

[facility]



     (HumaLOG      00   :00                           1 dose, On           Medic

al



     U-100)                                         Wed            Branch



     injection                                         22 at           



     10 Units                                         , ASAP           

 

     ertapenem      2022- No             1000mg      1,000 mg,           

[facility]



     (INVANZ)                                Intramuscu           ity 

of



     injection      01:30: 18:10                          lar, Q24H           [facility]

[facility]



     1,000 mg      00   :43                           ABX, First           Medic

al



                                                  dose on           Branch



                                                  Wed            



                                                  22 at           



                                                  2030,           



                                                  Until           



                                                  Discontinu           



                                                  ed,            



                                                  ASAP<br&gt           



                                                  ;Reason           



                                                  for            



                                                  Anti-Infec           



                                                  tive:           



                                                  Empiric           



                                                  Therapy           



                                                  for            



                                                  Suspected           



                                                  Infection<           



                                                  br>Empiric           



                                                  Therapy           



                                                  Site:           



                                                  Urine<br>D           



                                                  uration of           



                                                  therapy:           



                                                  72             



                                                  hours<br>R           



                                                  estricted           



                                                  use            



                                                  approved           



                                                  by:            



                                                  History of           



                                                  ESBL           



                                                  infection           



                                                  in past 3           



                                                  months           

 

     HYDROmorphO      2022- No             1mg       1 mg,           Univ

ers



     ne                                  Intravenou           ity of



     (DILAUDID)      01:01: 19:45                          s, Q4HPRN,           

[place]



     injection 1      21   :38                           Starting           Medi

[facility]



     mg                                           on Wed           Branch



                                                  22 at           



                                                  2001,           



                                                  Until Thu           



                                                  22 at           



                                                  1445,           



                                                  Routine,           



                                                  Pain           



                                                  (scale           



                                                  7-10)<br>U           



                                                  se             



                                                  approved           



                                                  by             



                                                  (Faculty):           



                                                  ADC            



                                                  PROVIDER           

 

     Sliding      2022- No                       Subcutaneo           Uni

vers



     Scale                                us, Q3H,           ity of



     Insulin -      01:00: 18:35                          First dose           [facility]

[place]



     Lispro      00   :13                           (after           Medical



     (HumaLOG) +                                         last           Branch



     Fsbg                                         modificati           



     Testing                                         on) on 22 at           



                                                  2000,           



                                                  Until           



                                                  Discontinu           



                                                  ed,            



                                                  Routine           

 

     pantoprazol            Yes            40mg      40 mg,           Univ

ers



     e                                        Oral,           ity of



     (PROTONIX)      00:30:                               DAILY,           [place]



     EC tablet      00                                 First dose           Medi

sar[facility]



     40 mg                                         on Wed           Branch



                                                  22 at           



                                                  1930,           



                                                  Until           



                                                  Discontinu           



                                                  ed,            



                                                  Routine           

 

     FENTanyl PF      2022- No             50ug      50 mcg,           Un

[facility]



     (SUBLIMAZE                                Intramuscu           it

y of



     (PF))      22:51: 01:01                          lar,           [place]



     injection      14   :59                           Q4HPRN,           Medical



     50 mcg                                         Starting           Branch



                                                  on 22 at           



                                                  1751,           



                                                  Until 22 at           



                                                  2001,           



                                                  Routine,           



                                                  Pain           



                                                  (scale           



                                                  7-10)           

 

     Sliding      2022- No                       Subcutaneo           Uni

vers



     Scale                                us, Q3H,           ity of



     Insulin -      22:00: 00:29                          First dose           [facility]

[place]



     Lispro      00   :25                           on Wed           Medical



     (HumaLOG) +                                         22 at           [facility]



     Fsbg                                         1700,           



     Testing                                         Until           



                                                  Discontinu           



                                                  ed,            



                                                  Routine           

 

     acetaminoph            Yes            1000mg      1,000 mg,          

 [facility]



     en                                       Oral, Q8H,           ity of



     (TYLENOL)      21:15:                               First dose           [facility]

[place]



     tablet      00                                 on Wed           Medical



     1,000 mg                                         22 at           [facility]

h



                                                  1615,           



                                                  Until           



                                                  Discontinu           



                                                  ed,            



                                                  Routine           

 

     FENTanyl PF      2022- No             50ug      50 mcg,           Un

[facility]



     (SUBLIMAZE                                Slow IV           ity o

f



     (PF))      21:07: 22:51                          Push,           [place]



     injection      07   :27                           Q4HPRN,           Medical



     50 mcg                                         Starting           Branch



                                                  on 22 at           



                                                  1607,           



                                                  Until 22 at           



                                                  1751,           



                                                  Routine,           



                                                  Pain           



                                                  (scale           



                                                  7-10)           

 

     insulin      2022- No             30U       30 Units,           Univ

ers



     glargine                                Subcutaneo           ity 

of



     (LANTUS      19:30: 18:47                          us, DAILY,           Eric

as



     U-100)      00   :44                           First dose           Medical



     injection                                         (after           Branch



     30 Units                                         last           



                                                  modificati           



                                                  on) on 22 at           



                                                  1430,           



                                                  Until           



                                                  Discontinu           



                                                  ed,            



                                                  Routine           

 

     glucagon            Yes            1mg       1 mg,           [facility]



     (GLUCAGEN                                     Intramuscu           ity 

of



     DIAGNOSTIC      19:19:                               lar, PRN,           [facility]

[place]



     KIT)      57                                 Starting           Medical



     injection 1                                         on Wed           Branch



     mg                                           22 at           



                                                  1419,           



                                                  Until           



                                                  Discontinu           



                                                  ed, ASAP,           



                                                  Blood           



                                                  Glucose           



                                                  &amp;lt;           



                                                  or = 70           



                                                  mg/dL and           



                                                  patient is           



                                                  unable to           



                                                  swallow or           



                                                  has mental           



                                                  changes.           

 

     dextrose 50            Yes            25mL      25 mL,           Univ

ers



     % in water                                     Slow IV           ity of



     (D50W)      19:19:                               Push, PRN,           [place]



     injection      57                                 Starting           Medica

l



     25 mL                                         on Wed           Branch



                                                  22 at           



                                                  1419,           



                                                  Until           



                                                  Discontinu           



                                                  ed, ASAP,           



                                                  Blood           



                                                  Glucose           



                                                  &amp;lt;           



                                                  or = 70           



                                                  mg/dL and           



                                                  patient is           



                                                  unable to           



                                                  swallow or           



                                                  has mental           



                                                  status           



                                                  changes.           

 

     FENTanyl PF      2022- No             25ug      25 mcg,           Un

[facility]



     (SUBLIMAZE                                Slow IV           ity o

f



     (PF))      18:57: 21:09                          Push,           [place]



     injection      03   :29                           Q4HPRN,           Medical



     25 mcg                                         Starting           Branch



                                                  on 22 at           



                                                  1357,           



                                                  Until 22 at           



                                                  1609,           



                                                  Routine,           



                                                  Pain           



                                                  (scale           



                                                  7-10)           

 

     D5W IV      2022- No             1000mL      at 500           Univer

s



     infusion                                mL/hr, IV           ity o

f



     1,000 mL      18:30: 20:00                          Infusion,           Eric

as



               00   :00                           ONCE, 1           Medical



                                                  dose, On           Branch



                                                  Wed            



                                                  22 at           



                                                  1330,           



                                                  Routine           

 

     NaCl 0.9%      2022- No             1000mL      at 999           Uni

vers



     (NS) bolus                                mL/hr,           ity of



     infusion      18:15: 20:19                          1,000 mL,           Eric

as



     1,000 mL      00   :00                           IV             Medical



                                                  Infusion,           Branch



                                                  ONCE, 1           



                                                  dose, On           



                                                  22 at           



                                                  1315, STAT           

 

     potassium      2022- No             20meq      20 mEq, IV           

[facility]



     chloride 20                                Piggyback,           i

ty of



     mEq/100 mL      17:45: 21:20                          Q1H, 4           [place]

[facility]



     (KCL) 20      00   :00                           doses,           Medical



     mEq/100 mL                                         First dose           [facility]

[facility]



     RTU IVPB 20                                         on Wed           



     mEq                                          22 at           



                                                  1245, Last           



                                                  dose on           



                                                  22 at           



                                                  1500, 100           



                                                  mL             

 

     KCL       2022- No                       IV             [facility]



     (POTASSIUM                                Infusion,           ity

 of



     CHLORIDE)      17:30: 00:27                          CONTINUOUS           T

[facility]



     40 mEq in      00   :47                           , Starting           Medi

sar[facility]



     NaCl 0.45%                                         on Wed           Branch



     (1/2NS) IV                                         22 at           



     Solution                                         1230,           



                                                  Until 22 at           



                                                  1927,           



                                                  1,000 mL,           



                                                  at 200           



                                                  mL/hr           

 

     KCL       2022- No                       IV             [facility]



     (POTASSIUM                                Infusion,           ity

 of



     CHLORIDE)      15:45: 17:15                          CONTINUOUS           T

[facility]



     40 mEq in      00   :42                           , Starting           Medi

sarah



     NaCl 0.45%                                         on Wed           Branch



     (1/2NS) IV                                         22 at           



     Solution                                         1045,           



                                                  Until 22 at           



                                                  1215,           



                                                  1,000 mL,           



                                                  at 150           



                                                  mL/hr           

 

     ondansetron            Yes            4mg       4 mg, Slow           

[facility]



     (ZOFRAN                                     IV Push,           ity of



     (PF))      15:38:                               Q6HPRN,           [place]



     injection 4      50                                 Nausea and           Me

dical



     mg                                           Vomiting           Branch



                                                  (N/V),           



                                                  Starting           



                                                  on 22 at           



                                                  1038<br>Do           



                                                  ses of           



                                                  ondansetro           



                                                  n 16 mg           



                                                  and above           



                                                  need to be           



                                                  administer           



                                                  ed via IV           



                                                  piggyback.           



                                                  For Dose           



                                                  >=24mg ECG           



                                                  monitoring           



                                                  is             



                                                  advisable.           



                                                  <br>           

 

     enoxaparin            Yes            40mg      40 mg,           Unive

rs



     (LOVENOX)                                     Subcutaneo           ity 

of



     injection      14:00:                               us, DAILY,           [facility]

[facility]



     40 mg      00                                 First dose           Medical



                                                  on Wed           Branch



                                                  22 at           



                                                  0900,           



                                                  Until           



                                                  Discontinu           



                                                  ed,            



                                                  Routine           

 

     fluconazole      2022- No             200mg      at 100           Un

[facility]



     (DIFLUCAN)       07-28                          mL/hr, IV           ity

 of



     Piggyback      13:45: 00:25                          Piggyback,           T

[facility]



     200 mg      00   :36                           Q24H ABX,           Medical



                                                  First dose           Branch



                                                  on 22 at           



                                                  0845,           



                                                  Until           



                                                  Discontinu           



                                                  ed,            



                                                  ASAP<br>Do           



                                                  Not            



                                                  Refrigerat           



                                                  e.<br>           

 

     NORepinephr      2022- No             .05ug/k      0.05-1.5         

  [facility]



     ine 4 mg in                      g/min      mcg/kg/min           

ity of



     0.9% NaCl      12:21: 22:51                          ?127 kg           [place]

s



     250 mL      22   :36                           (23.8125-7           Medical



     infusion                                         14.375           Branch



     RTU                                          mL/hr,           



                                                  rounded to           



                                                  23..           



                                                  38 mL/hr),           



                                                  IV             



                                                  Infusion,           



                                                  TITRATE,           



                                                  MAP Goal >           



                                                  or = 65           



                                                  mmHg,           



                                                  Starting           



                                                  on 22 at           



                                                  0721<br>In           



                                                  itiate           



                                                  titration           



                                                  at 0.05           



                                                  mcg/kg/min           



                                                  .&nbsp;&nb           



                                                  sp;Increas           



                                                  e by 0.01           



                                                  mcg/kg/min           



                                                  every 30           



                                                  seconds to           



                                                  5 minutes           



                                                  as needed           



                                                  to reach           



                                                  and            



                                                  maintain           



                                                  goal blood           



                                                  pressure.&           



                                                  nbsp;&nbsp           



                                                  ;Maximum           



                                                  dose = 1.5           



                                                  mcg/kg/min           



                                                  .&nbsp;&nb           



                                                  sp;If goal           



                                                  not            



                                                  maintained           



                                                  at maximum           



                                                  allowed           



                                                  dose,           



                                                  contact           



                                                  prescriber           



                                                  .<br>           

 

     potassium       No             10meq      10 mEq, IV           

[facility]



     chloride in                                Piggyback,           i

ty of



     water 10      12:00: 14:44                          Q1H, 3           [place]



     mEq/100 mL      00   :00                           doses,           Medical



     RTU 10 mEq                                         First dose           Bra

nch



                                                  on 22 at           



                                                  0700, Last           



                                                  dose on           



                                                  22 at           



                                                  0900,           



                                                  Administer           



                                                  over 60           



                                                  Minutes,           



                                                  100 mL           

 

     aztreonam       No             1000mg      1,000 mg,           

[facility]



     (AZACTAM)                                Slow IV           ity of



     injection      12:00: 12:41                          Push, Q8H           [facility]

[facility]



     1,000 mg      00   :55                           ABX, First           Medic

al



                                                  dose on           Branch



                                                  22 at           



                                                  0700,           



                                                  Until           



                                                  Discontinu           



                                                  ed<br>Reas           



                                                  on for           



                                                  Anti-Infec           



                                                  tive:           



                                                  Empiric           



                                                  Therapy           



                                                  for            



                                                  Suspected           



                                                  Infection<           



                                                  br>Empiric           



                                                  Therapy           



                                                  Site:           



                                                  Urine&lt;b           



                                                  r>Duration           



                                                  of             



                                                  therapy: 7           



                                                  days           

 

     NaCl 0.9%       No             1000mL      at 999           Uni

vers



     (NS) IV                                mL/hr, IV           ity of



     infusion      11:45: 11:49                          Infusion,           Eric

as



     1,000 mL      00   :30                           ONCE, 1           Medical



                                                  dose, On           22 at           



                                                  0645,           



                                                  Routine           

 

     NaCl 0.45%      2022- No             1000mL      at 250           Un

[facility]



     (1/2NS) IV                                mL/hr,           ity of



     infusion      11:30: 14:41                          1,000 mL,           Eric

as



     1,000 mL      00   :42                           IV             Medical



                                                  Infusion,           Branch



                                                  CONTINUOUS           



                                                  , Starting           



                                                  on 22 at           



                                                  0630,           



                                                  Until 22 at           



                                                  0941, ASAP           

 

     insulin      2022- No             0U/kg/h      0-0.3           Unive

rs



     regular in      -27                          Units/kg/h           it

y of



     0.9 % NaCl      11:19: 19:18                          r ?127 kg           T

[facility]



     (MYXREDLIN)      08   :18                           (0-38.1           Medic

al



     100                                          mL/hr), IV           Branch



     unit/100 mL                                         Infusion,           



     (1 unit/mL)                                         TITRATE,           



     RTU IV                                         Parameters           



     infusion                                         in Admin.           



                                                  Instr.,           



                                                  Follow [facility]           



                                                  Insulin           



                                                  Rate           



                                                  Adjustment           



                                                  Protocol,           



                                                  Starting           



                                                  on 22 at           



                                                  0619<br>-           



                                                  Follow           



                                                  '[facility]           



                                                  Insulin           



                                                  Rate           



                                                  Adjustment           



                                                  Protocol'           



                                                  &nbsp;-           



                                                  Start           



                                                  insulin           



                                                  infusion           



                                                  at 0.1           



                                                  units/kg           



                                                  /hr. When           



                                                  adjusting           



                                                  rate, do           



                                                  not            



                                                  increase           



                                                  rate above           



                                                  0.3            



                                                  units/kg/h           



                                                  our.&nbsp;           



                                                  - Hold           



                                                  insulin           



                                                  and NHO           



                                                  STAT if           



                                                  potassium           



                                                  is less           



                                                  than 3.3           



                                                  mEq/L.&nbs           



                                                  p;- NHO           



                                                  STAT if           



                                                  blood           



                                                  glucose           



                                                  less than           



                                                  100 mg/dL           



                                                  or greater           



                                                  than 600           



                                                  mg/dL or           



                                                  if blood           



                                                  glucose           



                                                  not            



                                                  decreased           



                                                  by 50           



                                                  mg/dL in           



                                                  the first           



                                                  hour of           



                                                  insulin           



                                                  infusion.&           



                                                  nbsp;-           



                                                  Once blood           



                                                  glucose is           



                                                  less than           



                                                  or equal           



                                                  to 200           



                                                  mg/dL in           



                                                  patient           



                                                  with DKA           



                                                  or blood           



                                                  glucose is           



                                                  less than           



                                                  or equal           



                                                  to 300           



                                                  mg/dL in           



                                                  patient           



                                                  with [facility],           



                                                  change to           



                                                  fluids           



                                                  with           



                                                  dextrose.&           



                                                  nbsp;&amp;           



                                                  nbsp;&nbsp           



                                                  ;- If           



                                                  during           



                                                  insulin           



                                                  infusion           



                                                  and on           



                                                  dextrose           



                                                  fluids           



                                                  blood           



                                                  glucose is           



                                                  less than           



                                                  150 mg/dL           



                                                  in DKA or           



                                                  less than           



                                                  200 mg/dL           



                                                  in [facility],           



                                                  [facility] for           



                                                  increase           



                                                  in             



                                                  dextrose           



                                                  provided           



                                                  in             



                                                  continuous           



                                                  fluids.&nb           



                                                  sp;- Once           



                                                  AGAP less           



                                                  than 12,           



                                                  blood           



                                                  glucose           



                                                  less than           



                                                  200 mg/dL,           



                                                  TCO2           



                                                  greater           



                                                  than 15 x           



                                                  2 and           



                                                  patient           



                                                  ready to           



                                                  eat, [facility]           



                                                  for SubQ           



                                                  glargine           



                                                  order two           



                                                  hours           



                                                  before           



                                                  stopping           



                                                  insulin           



                                                  infusion.<           



                                                  br>            

 

     NaCl 0.9%      2022- No             1000mL      at 999           Uni

vers



     (NS) IV                                mL/hr, IV           ity of



     infusion      08:30: 11:00                          Infusion,           Eric

as



     1,000 mL      00   :00                           ONCE, 1           Medical



                                                  dose, On           Branch



                                                  22 at           



                                                  0330,           



                                                  Routine           

 

     NaCl 0.45%      2022- No             1000mL      at 250           Un

[facility]



     (1/2NS) IV                                mL/hr,           ity of



     infusion      06:00: 11:20                          1,000 mL,           Eric

as



     1,000 mL      00   :23                           IV             Medical



                                                  Infusion,           [facility]



                                                  CONTINUOUS           



                                                  , Starting           



                                                  on 22 at           



                                                  0100,           



                                                  Until 22 at           



                                                  0620, ASAP           

 

     FENTanyl PF      2022- No             50ug      50 mcg,           Un

[facility]



     (SUBLIMAZE                                Slow IV           ity o

f



     (PF))      04:15: 03:07                          Push,           [place]



     injection      00   :00                           ONCE, 1           Medical



     50 mcg                                         dose, On           Branch



                                                  22 at           



                                                  2315,           



                                                  Routine           

 

     cefTRIAXone      2022- No             1000mg      1,000 mg,         

  [facility]



     (ROCEPHIN)                                Intraven[facility]           it

y of



     1,000 mg in      04:00: 06:06                          s, ONCE, 1          

 [place]



     NaCl 0.9%      00   :00                           dose, On           Medica

l



     (NS) 50 mL                                         Tue            [facility]



     MINI-BAG                                         22 at           



                                                  2300,           



                                                  Administer           



                                                  over 30           



                                                  Minutes,           



                                                  50             



                                                  mL<br&gt;R           



                                                  elmira for           



                                                  Anti-Infec           



                                                  tive:           



                                                  Documented           



                                                  Infection<           



                                                  br>Documen           



                                                  huma            



                                                  Infection           



                                                  Site:           



                                                  Urine<br&g           



                                                  t;Duration           



                                                  of             



                                                  Therapy:           



                                                  Other (see           



                                                  Comments)           

 

     NaCl 0.9%      2022- No             1000mL      at 999           Uni

vers



     (NS) bolus                                mL/hr,           ity of



     infusion      03:00: 04:14                          1,000 mL,           Eric

as



     1,000 mL      00   :00                           IV             Medical



                                                  Infusion,           [facility]



                                                  ONCE, 1           



                                                  dose, On           



                                                  22 at           



                                                  2200, STAT           

 

     insulin      2022- No             10U       10 Units,           Univ

ers



     regular                                Subcutaneo           ity o

f



     human      01:30: 00:37                          , ONCE,           [place]



     (HUMULIN R)      00   :00                           1 dose, On           Me

dical



     injection                                         Tue            [facility]



     10 Units                                         22 at           



                                                  2030,           



                                                  Routine           

 

     FENTanyl PF      2022- No             50ug      50 mcg,           Un

[facility]



     (SUBLIMAZE                                Slow IV           ity o

f



     (PF))      01:30: 00:28                          Push,           [place]



     injection      00   :00                           ONCE, 1           Medical



     50 mcg                                         dose, On           [facility]



                                                  Tu22 at           



                                                  2030,           



                                                  Routine           

 

     NaCl 0.9%       No             1000mL      at 999           Uni

vers



     (NS) bolus                                mL/hr,           ity of



     infusion      00:30: 02:09                          1,000 mL,           Eric

as



     1,000 mL      00   :00                           IV             Medical



                                                  Infusion,           [facility]



                                                  ONCE, 1           



                                                  dose, On           



                                                  Tu22 at           



                                                  1930, STAT           

 

     iopamidol      2022- No        [id number] 65mL      65 mL,         

  [facility]



     (ISOVUE                 [id number]           Intravenou           ity

 of



     370-500 mL)      02:29: 02:45                          s, ONCE, 1          

 [place]



     injection      00   :00                           dose, On           Medica

l



     65 mL                                         Fri            [facility]



                                                  7/15/22 at           



                                                  2145,           



                                                  Routine           

 

     FENTanyl PF      2022- No             150ug      150 mcg,           

[facility]



     (SUBLIMAZE                                Slow IV           ity o

f



     (PF))      01:32: 01:44                          Push,           [place]



     injection      00   :00                           ONCE, 1           Medical



     150 mcg                                         dose, On           [facility]



                                                  Fri            



                                                  7/15/22 at           



                                                  2045,           



                                                  Routine           

 

     FENTanyl PF      2022- No             50ug      50 mcg,           Un

[facility]



     (SUBLIMAZE      7-16 07-15                          Slow IV           ity o

f



     (PF))      00:30: 23:32                          Push,           [place]



     injection      00   :00                           ONCE, 1           Medical



     50 mcg                                         dose, On           Branch



                                                  Fri            



                                                  7/15/22 at           



                                                  1930,           



                                                  Routine           

 

     insulin      2022- No             10U       10 Units,           Univ

ers



     regular      7-16 07-15                          Subcutaneo           ity o

f



     human      00:30: 23:28                          , ONCE,           [place]



     (HUMULIN R)      00   :00                           1 dose, On           Me

dical



     injection                                         Fri            [facility]



     10 Units                                         7/15/22 at           



                                                  1930,           



                                                  Routine           

 

     clindamycin      2022- No        [id number] 600mg      Take 2      

     [facility]



     300 mg      7-15 07-23           [id number]           capsules           ity of



     capsule      00:00: 04:59                          by mouth 4           [facility]

as



               00   :00                           (four)           Medical



                                                  times           [facility]



                                                  daily for           



                                                  7 days.           

 

     clindamycin      2022- No        [id number] 600mg      Take 2      

     [facility]



     300 mg      7-15 07-23           [id number]           capsules           ity of



     capsule      00:00: 04:59                          by mouth 4           [facility]

as



               00   :00                           (four)           Medical



                                                  times           [place]



                                                  daily for           



                                                  7 days.           

 

     insulin NPH      2022- No             8U        8 Units,           U

nivers



     and regular                                Subcutaneo           i

ty of



     human 70-30      12:30: 11:30                          us, ONCE,           

[place]



     (70-30      00   :00                           1 dose, On           Medical



     U-100                                         Wed 22           [facility]



     INSULIN)                                         at 0730,           



     100 unit/mL                                         ASAP           



     (70-30)                                                        



     injection 8                                                        



     Units                                                        

 

     insulin            Yes       [id number] 20U       inject 20           U

nivers



     glargine      7-02                               Units           ity of



     100 unit/mL      00:00:                               under the           [facility]

[facility]



     injection      00                                 skin           Medical



                                                  daily.           [facility]anch

 

     insulin            Yes       [id number] 20U       inject 20           U

nivers



     glargine      7-02                               Units           ity of



     100 unit/mL      00:00:                               under the           T

[facility]



     injection      00                                 skin           Medical



                                                  daily.           [facility]anch

 

     insulin            Yes       [id number] 20U       inject 20           U

nivers



     glargine      7-02                               Units           ity of



     100 unit/mL      00:00:                               under the           T

[facility]



     injection      00                                 skin           Medical



                                                  daily.           [facility]

 

     insulin            Yes       [id number] 20U       inject 20           U

nivers



     glargine      7-02                               Units           ity of



     100 unit/mL      00:00:                               under the           T

[facility]



     injection      00                                 skin           Medical



                                                  daily.           [facility]

 

     insulin      2022- No        [id number] 20U       inject 20           

[facility]



     glargine       07-30                          Units           ity of



     100 unit/mL      00:00: 00:00                          under the           

[place]



     injection      00   :00                           skin           Medical



                                                  daily.           [facility]

 

     insulin      2022- No        [id number] 20U       inject 20           

[facility]



     glargine      7- 07-30                          Units           ity of



     100 unit/mL      00:00: 00:00                          under the           

[place]



     injection      00   :00                           skin           Medical



                                                  daily.           [facility]

 

     insulin            Yes            20U       20 Units,           Unive

rs



     glargine                                     Subcutaneo           ity o

f



     (LANTUS      14:00:                               us, DAILY,           [place]

[facility]



     U-100)      00                                 First dose           Medical



     injection                                         (after           [facility]



     20 Units                                         last           



                                                  modificati           



                                                  on) on 22 at           



                                                  0900,           



                                                  Until           



                                                  Discontinu           



                                                  ed,            



                                                  Routine           

 

     insulin      2022- No        [id number] 26U       inject 26           

[facility]



     lispro,       08-01                          Units           ity of



     human, 100      00:00: 04:59                          under the           T

[facility]



     unit/mL      00   :00                           skin 3           Medical



     injection                                         (three)           [facility]



                                                  times           



                                                  daily           



                                                  before           



                                                  meals for           



                                                  30 days.           

 

     insulin      2022- No        [id number] 26U       inject 26           

[facility]



     lispro,       08-                          Units           ity of



     human, 100      00:00: 04:59                          under the           T

[facility]



     unit/mL      00   :00                           skin 3           Medical



     injection                                         (three)           [facility]



                                                  times           



                                                  daily           



                                                  before           



                                                  meals for           



                                                  30 days.           

 

     insulin      2022- No        [id number] 26U       inject 26           

[facility]



     lispro,       08-                          Units           ity of



     human, 100      00:00: 04:59                          under the           T

[facility]



     unit/mL      00   :00                           skin 3           Medical



     injection                                         (three)           [facility]



                                                  times           



                                                  daily           



                                                  before           



                                                  meals for           



                                                  30 days.           

 

     insulin      2022- No        [id number] 26U       inject 26           

[facility]



     lispro,       08-                          Units           ity of



     human, 100      00:00: 04:59                          under the           T

[facility]



     unit/mL      00   :00                           skin 3           Medical



     injection                                         (three)           [facility]



                                                  times           



                                                  daily           



                                                  before           



                                                  meals for           



                                                  30 days.           

 

     insulin      2022- No        [id number] 26U       inject 26           

[facility]



     lispro,       07-30                          Units           ity of



     human, 100      00:00: 00:00                          under the           T

[facility]



     unit/mL      00   :00                           skin 3           Medical



     injection                                         (three)           [facility]



                                                  times           



                                                  daily           



                                                  before           



                                                  meals for           



                                                  30 days.           

 

     insulin      2022- No        [id number] 26U       inject 26           

[facility]



     lispro,       07-30                          Units           ity of



     human, 100      00:00: 00:00                          under the           T

[facility]



     unit/mL      00   :00                           skin 3           Medical



     injection                                         (three)           [facility]



                                                  times           



                                                  daily           



                                                  before           



                                                  meals for           



                                                  30 days.           

 

     insulin            Yes            26U       26 Units,           Unive

rs



     lispro      6-30                               Subcutaneo           ity of



     (human)      22:15:                               us, TIDAC,           [place]

[facility]



     (HumaLOG      00                                 First dose           Medic

al



     U-100)                                         (after           [facility]



     injection                                         last           



     26 Units                                         modificati           



                                                  on) on 22 at           



                                                  1715,           



                                                  Until           



                                                  Discontinu           



                                                  ed,            



                                                  Routine           

 

     insulin      2022- No             22U       22 Units,           Univ

ers



     lispro                                Subcutaneo           ity of



     (human)      16:30: 21:36                          us, TIDAC,           Eric

as



     (HumaLOG      00   :36                           First dose           Medic

al



     U-100)                                         (after           [facility]



     injection                                         last           



     22 Units                                         modificati           



                                                  on) on 22 at           



                                                  1130,           



                                                  Until           



                                                  Discontinu           



                                                  ed,            



                                                  Routine           

 

     insulin      2022- No             10U       10 Units,           Univ

ers



     glargine                                Subcutaneo           ity 

of



     (LANTUS      14:00: 17:40                          us, DAILY,           Eric

as



     U-100)      00   :01                           First dose           Medical



     injection                                         on Thu           [facility]anch



     10 Units                                         22 at           



                                                  0900,           



                                                  Until           



                                                  Discontinu           



                                                  ed,            



                                                  Routine           

 

     insulin            Yes            52U       52 Units,           Unive

rs



     glargine                                     Subcutaneo           ity o

f



     (LANTUS      02:00:                               us, [facility],           [place]



     U-100)      00                                 First dose           Medical



     injection                                         on Wed           Branch



     52 Units                                         22 at           



                                                  2100,           



                                                  Until           



                                                  Discontinu           



                                                  ed,            



                                                  Routine           

 

     enoxaparin            Yes            40mg      40 mg,           Unive

rs



     (LOVENOX)                                     Subcutaneo           ity 

of



     injection      22:00:                               [place], [place],           [place]

[place]



     40 mg      00                                 First dose           Medical



                                                  on Wed           [facility]anch



                                                  22 at           



                                                  1700,           



                                                  Until           



                                                  Discontinu           



                                                  ed,            



                                                  Routine           

 

     Sliding            Yes                      Subcutaneo           Univ

ers



     Scale                                     us, TID           ity of



     Insulin -      17:00:                               MEALS+HS,           Eric

as



     Lispro      00                                 First dose           Medical



     (HumaLOG) +                                         (after           Branch



     Fsbg                                         last           



     Testing                                         modificati           



                                                  on) on 22 at           



                                                  1200,           



                                                  Until           



                                                  Discontinu           



                                                  ed,            



                                                  Routine           

 

     insulin      2022- No             18U       18 Units,           Univ

ers



     lispro                                Subcutaneo           ity of



     (human)      16:30: 14:46                          us, TIDAC,           Eric

as



     (HumaLOG      00   :32                           First dose           Medic

al



     U-100)                                         (after           Branch



     injection                                         last           



     18 Units                                         modificati           



                                                  on) on 22 at           



                                                  1130,           



                                                  Until           



                                                  Discontinu           



                                                  ed,            



                                                  Routine           

 

     amLODIPine            Yes            10mg      10 mg,           Unive

rs



     (NORVASC)                                     Oral,           ity of



     tablet 10      14:00:                               DAILY,           [place]



     mg        00                                 First dose           Medical



                                                  on 22 at           



                                                  0900,           



                                                  Until           



                                                  Discontinu           



                                                  ed,            



                                                  Routine           

 

     cefTRIAXone            Yes            1000mg      1,000 mg,          

 [facility]



     (ROCEPHIN)                                     IV             ity of



     1,000 mg in      13:00:                               Piggyback,           

[place]



     NaCl 0.9%      00                                 Q24H ABX,           Medic

al



     (NS) 50 mL                                         First dose           Bra

[facility]



     MINI-BAG                                         on 22 at           



                                                  0800,           



                                                  Until           



                                                  Discontinu           



                                                  ed,            



                                                  Administer           



                                                  over 30           



                                                  Minutes,           



                                                  50             



                                                  mL<br>Reas           



                                                  on for           



                                                  Anti-Infec           



                                                  tive:           



                                                  Documented           



                                                  Infection<           



                                                  br>Documen           



                                                  huma            



                                                  Infection           



                                                  Site:           



                                                  Urine<br>D           



                                                  uration of           



                                                  Therapy: 7           



                                                  days           

 

     Sliding                             Subcutaneo           Uni

vers



     Scale                                us, TID           ity of



     Insulin -      13:00: 15:19                          MEALS+HS,           [place]

[place]



     Lispro      00   :51                           First dose           Medical



     (HumaLOG) +                                         on 



     Fsbg                                         22 at           



     Testing                                         0800,           



                                                  Until           



                                                  Discontinu           



                                                  ed,            



                                                  Routine           

 

     insulin      2022- No             15U       15 Units,           Univ

ers



     lispro                                Subcutaneo           ity of



     (human)      12:30: 15:19                          us, TIDAC,           Eric

as



     (HumaLOG      00   :51                           First dose           Medic

al



     U-100)                                         on 



     injection                                         22 at           



     15 Units                                         0730,           



                                                  Until           



                                                  Discontinu           



                                                  ed,            



                                                  Routine           

 

     HYDROmorpho       No             1mg       1 mg, Slow          

 [facility]



     ne         0701                          IV Push,           ity of



     (DILAUDID)      11:32: 11:31                          Q6HPRN,           Eric

as



     injection 1      00   :00                           Starting           Medi

sarah



     mg                                           on Wed           Branch



                                                  22 at           



                                                  0632,           



                                                  Until 22 at           



                                                  0631,           



                                                  Routine,           



                                                  Pain           



                                                  (scale           



                                                  7-10)<br>U           



                                                  se             



                                                  approved           



                                                  by             



                                                  (Faculty):           



                                                  [facility]            



                                                  PROVIDER           

 

     NaCl 0.9%      2022- No             1000mL      at 999           Uni

vers



     (NS) bolus                                mL/hr,           ity of



     infusion      11:00: 11:04                          1,000 mL,           Eric

as



     1,000 mL      00   :00                           IV             Medical



                                                  Infusion,           Branch



                                                  ONCE, 1           



                                                  dose, On           



                                                  22 at           



                                                  0600, STAT           

 

     HYDROcodone      -0      Yes            1{tbl}      1 tablet,          

 [facility]



     -acetaminop                                     Oral,           ity of



     hen (NORCO      10:54:                               Q6HPRN,           [place]

[place]



     5) 5-325 mg      27                                 Starting           Medi

sarah



     tablet 1                                         on Wed           Branch



     tablet                                         22 at           



                                                  0554,           



                                                  Until           



                                                  Discontinu           



                                                  ed,            



                                                  Routine,           



                                                  Pain           



                                                  (scale           



                                                  4-6)           

 

     dextrose      0      Yes            250mL      250 mL, IV           Un

[facility]



     10% (D10W)                                     Infusion,           ity 

of



     bolus      10:53:                               PRN - SEE           [place]



     infusion      43                                 INSTRUCTIO           Medic

al



     250 mL                                         NS,            Branch



                                                  Administer           



                                                  over 60           



                                                  Minutes,           



                                                  hypoglycem           



                                                  ia,            



                                                  Starting           



                                                  on 22 at           



                                                  0553<br>De           



                                                  xtrose 10%           



                                                  250 mL bag           



                                                  contains:&           



                                                  nbsp;10 gm           



                                                  = 100           



                                                  mL&nbsp;20           



                                                  gm = 200           



                                                  mL&nbsp;25           



                                                  gm = 250           



                                                  mL (whole           



                                                  bag)&nbsp;           



                                                  &nbsp;The           



                                                  maximum           



                                                  rate at           



                                                  which           



                                                  dextrose           



                                                  can be           



                                                  infused           



                                                  without           



                                                  producing           



                                                  glycosuria           



                                                  is 0.5           



                                                  g/kg/hour.           



                                                  &nbsp;&nbs           



                                                  p;BUD: If           



                                                  wrapper is           



                                                  open bag           



                                                  is good           



                                                  for 30           



                                                  days at           



                                                  room           



                                                  temperatur           



                                                  e.&nbsp;<b           



                                                  r>             

 

     glucagon            Yes            1mg       1 mg,           [facility]



     (GLUCAGEN                                     Intramuscu           ity 

of



     DIAGNOSTIC      10:53:                               lar, PRN,           Te

[place]



     KIT)      40                                 Starting           Medical



     injection 1                                         on 



     mg                                           22 at           



                                                  0553,           



                                                  Until           



                                                  Discontinu           



                                                  ed, ASAP,           



                                                  Blood           



                                                  Glucose           



                                                  &amp;lt;           



                                                  or = 70           



                                                  mg/dL and           



                                                  patient is           



                                                  unable to           



                                                  swallow or           



                                                  has mental           



                                                  changes.           

 

     acetaminoph      0      Yes            650mg      650 mg,           Un

[place]



     en                                       Oral,           ity of



     (TYLENOL)      10:52:                               Q6HPRN,           [place]



     tablet 650      33                                 Starting           Medic

al



     mg                                           on Wed           Branch



                                                  22 at           



                                                  0552,           



                                                  Until           



                                                  Discontinu           



                                                  ed,            



                                                  Routine,           



                                                  Pain           



                                                  (scale           



                                                  1-3)           

 

     NaCl 0.9%      0 202- No             1000mL      at 999           Uni

vers



     (NS) bolus       06-29                          mL/hr,           ity of



     infusion      09:00: 08:10                          1,000 mL,           Eric

as



     1,000 mL      00   :00                           IV             Medical



                                                  Infusion,           Branch



                                                  ONCE, 1           



                                                  dose, On           



                                                  22 at           



                                                  0400, STAT           

 

     FENTanyl PF      2022- No             50ug      50 mcg,           Un

[place]



     (SUBLIMAZE                                Slow IV           ity o

f



     (PF))      08:22: 08:27                          Push,           [place]



     injection      00   :00                           ONCE, 1           Medical



     50 mcg                                         dose, On           Branch



                                                  22 at           



                                                  0330,           



                                                  Routine           

 

     insulin      2022- No             10U       10 Units,           Univ

ers



     regular                                Slow IV           ity of



     human      07:00: 05:53                          Push,           [place]



     (HUMULIN R)      00   :00                           ONCE, 1           Medic

al



     injection                                         dose, On           Branch



     10 Units                                         22 at           



                                                  0200, STAT           

 

     FENTanyl PF      2022- No             50ug      50 mcg,           Un

[place]



     (SUBLIMAZE                                Slow IV           ity o

f



     (PF))      04:15: 04:04                          Push,           [place]



     injection      00   :00                           ONCE, 1           Medical



     50 mcg                                         dose, On           Branch



                                                  22 at           



                                                  2315, STAT           

 

     NaCl 0.9%      2022- No             1000mL      at 999           Uni

vers



     (NS) bolus                                mL/hr,           ity of



     infusion      04:00: 05:53                          1,000 mL,           Eric

as



     1,000 mL      00   :00                           IV             Medical



                                                  Infusion,           Branch



                                                  ONCE, 1           



                                                  dose, On           



                                                  22 at           



                                                  2300, STAT           

 

     insulin      -0 - No             10U       10 Units,           Univ

ers



     regular                                Slow IV           ity of



     human      04:00: 03:48                          Push,           [place]



     (HUMULIN R)      00   :00                           ONCE, 1           Medic

al



     injection                                         dose, On           Branch



     10 Units                                         22 at           



                                                  2300, STAT           

 

     amLODIPine      -0 - No        [id number] 10mg      Take 1           

[facility]



     10 mg      6--30                          tablet by           ity of



     tablet      00:00: 00:00                          mouth           [place]



               00   :00                           daily for           Medical



                                                  30 days.           Branch

 

     amLODIPine      -0 - No        [id number] 10mg      Take 1           

[facility]



     10 mg      6--30                          tablet by           ity of



     tablet      00:00: 00:00                          mouth           [place]



               00   :00                           daily for           Medical



                                                  30 days.           Branch

 

     amLODIPine      2022- No        [id number] 10mg      Take 1           

[facility]



     10 mg      6-26 07-27                          tablet by           ity of



     tablet      00:00: 04:59                          mouth           [place]



               00   :00                           daily for           Medical



                                                  30 days.           [place]

 

     amLODIPine      2022- No        [id number] 10mg      Take 1           

[facility]



     10 mg      6-26 07-27                          tablet by           ity of



     tablet      00:00: 04:59                          mouth           [place]



               00   :00                           daily for           Medical



                                                  30 days.           [facility]

 

     amLODIPine      2022- No        [id number] 10mg      Take 1           

[facility]



     10 mg      6-26 07-27                          tablet by           ity of



     tablet      00:00: 04:59                          mouth           [place]



               00   :00                           daily for           Medical



                                                  30 days.           [place]

 

     amLODIPine      2022- No        [id number] 10mg      Take 1           

[facility]



     10 mg      6-26 07-27                          tablet by           ity of



     tablet      00:00: 04:59                          mouth           [place]



               00   :00                           daily for           Medical



                                                  30 days.           [place]

 

     amLODIPine      2022- No        [id number] 10mg      Take 1           

[facility]



     10 mg      6-26 07-27                          tablet by           ity of



     tablet      00:00: 04:59                          mouth           [place]



               00   :00                           daily for           Medical



                                                  30 days.           [place]

 

     amLODIPine      2022- No        [id number] 10mg      Take 1           

[facility]



     10 mg      6- 07-27                          tablet by           ity of



     tablet      00:00: 04:59                          mouth           [place]



               00   :00                           daily for           Medical



                                                  30 days.           [place]

 

     amLODIPine      2022- No        [id number] 10mg      Take 1           

[facility]



     10 mg      6-26 07-27                          tablet by           ity of



     tablet      00:00: 04:59                          mouth           [place]



               00   :00                           daily for           Medical



                                                  30 days.           [place]

 

     HYDROmorpho      2022- No             .5mg      0.5 mg,           [facility]

[facility]



     ne         06-25                          Slow IV           ity of



     (DILAUDID)      18:00: 17:11                          Push,           [place]



     injection      00   :00                           ONCE, 1           Medical



     0.5 mg                                         dose, On           [facility]



                                                  Sat            



                                                  22 at           



                                                  1300,           



                                                  Routine<br           



                                                  >Use           



                                                  approved           



                                                  by             



                                                  (Faculty):           



                                                  ADC            



                                                  PROVIDER           

 

     cefTRIAXone            Yes            1000mg      1,000 mg,          

 [facility]



     (ROCEPHIN)      6-25                               IV             ity of



     1,000 mg in      15:00:                               Piggyback,           

[place]



     NaCl 0.9%      00                                 Q24H ABX,           Medic

al



     (NS) 50 mL                                         First dose           [facility]

n[facility]



     MINI-BAG                                         on Sat           



                                                  22 at           



                                                  1000,           



                                                  Until           



                                                  Discontinu           



                                                  ed,            



                                                  Administer           



                                                  over 30           



                                                  Minutes,           



                                                  50             



                                                  mL<br>Reas           



                                                  on for           



                                                  Anti-Infec           



                                                  tive:           



                                                  Documented           



                                                  Infection<           



                                                  br>Documen           



                                                  huma            



                                                  Infection           



                                                  Site:           



                                                  Urine<br>D           



                                                  uration of           



                                                  Therapy: 7           



                                                  days           

 

     fluconazole            Yes            200mg      200 mg,           Un

[facility]



     (DIFLUCAN)      625                               Oral,           ity of



     tablet 200      14:00:                               DAILY,           [place]



     mg        00                                 First dose           Medical



                                                  on Sat           [facility]



                                                  22 at           



                                                  0900,           



                                                  Until           



                                                  Discontinu           



                                                  ed,            



                                                  ASAP<br>Re           



                                                  ason for           



                                                  Anti-Infec           



                                                  tive:           



                                                  Documented           



                                                  Infection<           



                                                  br>Documen           



                                                  huma            



                                                  Infection           



                                                  Site:           



                                                  Urine<br>D           



                                                  uration of           



                                                  Therapy: 7           



                                                  days           

 

     Sliding            Yes                      Subcutaneo           Univ

ers



     Scale      6-25                               us, TID           ity of



     Insulin -      13:00:                               MEALS+HS,           [facility]

as



     Lispro      00                                 First dose           Medical



     (HumaLOG) +                                         on [facility]



     Fsbg                                         22 at           



     Testing                                         0800,           



                                                  Until           



                                                  Discontinu           



                                                  ed,            



                                                  Routine           

 

     hydromorpho      2022- No             4ug       Take 4 mcg          

 [facility]



     ne HCl       06-25                          by mouth           ity of



     (HYDROMORPH      11:55: 00:00                          every 12           [facility]

[place]



     ONE ORAL)      19   :00                           (twelve)           Medica

l



                                                  hours.           [place]

 

     insulin      2022- No             10U       10 Units,           Univ

ers



     lispro       06-25                          Subcutaneo           ity of



     (human)      07:00: 06:53                          us, ONCE,           [place]

[place]



     (HumaLOG      00   :00                           1 dose, On           Medic

al



     U-100)                                         Sat            [facility]



     injection                                         22 at           



     10 Units                                         0200,           



                                                  Routine           

 

     glucagon            Yes            1mg       1 mg,           [facility]



     (GLUCAGEN                                     Intramuscu           ity 

of



     DIAGNOSTIC      05:50:                               lar, PRN,           [facility]

[place]



     KIT)      51                                 Starting           Medical



     injection 1                                         on [facility]



     mg                                           22 at           



                                                  0050,           



                                                  Until           



                                                  Discontinu           



                                                  ed, ASAP,           



                                                  Blood           



                                                  Glucose           



                                                  &amp;lt;           



                                                  or = 70           



                                                  mg/dL and           



                                                  patient is           



                                                  unable to           



                                                  swallow or           



                                                  has mental           



                                                  changes.           

 

     dextrose            Yes            250mL      250 mL, IV           Un

[facility]



     10% (D10W)                                     Infusion,           ity 

of



     bolus      05:50:                               PRN - SEE           [place]



     infusion      51                                 INSTRUCTIO           Medic

al



     250 mL                                         NS,            [facility]



                                                  Administer           



                                                  over 60           



                                                  Minutes,           



                                                  BS < 70,           



                                                  Starting           



                                                  on Sat           



                                                  22 at           



                                                  0050<br>De           



                                                  xtrose 10%           



                                                  250 mL bag           



                                                  contains:&           



                                                  nbsp;10 gm           



                                                  = 100           



                                                  mL&nbsp;20           



                                                  gm = 200           



                                                  mL&nbsp;25           



                                                  gm = 250           



                                                  mL (whole           



                                                  bag)&nbsp;           



                                                  &nbsp;The           



                                                  maximum           



                                                  rate at           



                                                  which           



                                                  dextrose           



                                                  can be           



                                                  infused           



                                                  without           



                                                  producing           



                                                  glycosuria           



                                                  is 0.5           



                                                  g/kg/hour.           



                                                  &nbsp;&nbs           



                                                  p;BUD: If           



                                                  wrapper is           



                                                  open bag           



                                                  is good           



                                                  for 30           



                                                  days at           



                                                  room           



                                                  temperatur           



                                                  e.&nbsp;<b           



                                                  r>             

 

     insulin            Yes            52U       52 Units,           Unive

rs



     glargine                                     Subcutaneo           ity o

f



     (LANTUS      02:00:                               us, [facility],           [place]



     U-100)      00                                 First dose           Medical



     injection                                         on Fri           [facility]



     52 Units                                         22 at           



                                                  2100,           



                                                  Until           



                                                  Discontinu           



                                                  ed,            



                                                  Routine           

 

     Sliding      2022- No                       Subcutaneo           Uni

vers



     Scale                                us, TID           ity of



     Insulin -      02:00: 05:50                          MEALS+HS,           [facility]

[place]



     Lispro      00   :40                           First dose           Medical



     (HumaLOG) +                                         on Fri           [facility]



     Fsbg                                         22 at           



     Testing                                         2100,           



                                                  Until           



                                                  Discontinu           



                                                  ed,            



                                                  Routine           

 

     ciprofloxac      2022- No        [id number] 500mg      Take 1         

  [facility]



     in HCl 500                                tablet by           ity

 of



     mg tablet      00:00: 04:59                          mouth           [place]



               00   :00                           every 12           Medical



                                                  (twelve)           [facility]



                                                  hours for           



                                                  10 days.           

 

     ciprofloxac      2022- No        [id number] 500mg      Take 1         

  [facility]



     in HCl 500      -06                          tablet by           ity

 of



     mg tablet      00:00: 04:59                          mouth           [place]



               00   :00                           every 12           Medical



                                                  (twelve)           [facility]



                                                  hours for           



                                                  10 days.           

 

     ciprofloxac      2022- No        [id number] 500mg      Take 1         

  [facility]



     in HCl 500      -06                          tablet by           ity

 of



     mg tablet      00:00: 04:59                          mouth           [place]



               00   :00                           every 12           Medical



                                                  (twelve)           [facility]



                                                  hours for           



                                                  10 days.           

 

     HYDROmorpho      2022- No        4647 4mg       Take 1           Uni

vers



     ne 4 mg                                tablet by           ity of



     tablet      00:00: 04:59                          mouth           [place]



               00   :00                           every 12           Medical



                                                  (twelve)           [facility]



                                                  hours for           



                                                  7 days.           



                                                  Indication           



                                                  s: acute           



                                                  pain           

 

     HYDROmorpho      2022- No        4647 4mg       Take 1           Uni

vers



     ne 4 mg      -                          tablet by           ity of



     tablet      00:00: 04:59                          mouth           [place]



               00   :00                           every 12           Medical



                                                  (twelve)           [facility]



                                                  hours for           



                                                  7 days.           



                                                  Indication           



                                                  s: acute           



                                                  pain           

 

     HYDROmorpho      2022- No        4647 4mg       Take 1           Uni

vers



     ne 4 mg                                tablet by           ity of



     tablet      00:00: 04:59                          mouth           [place]



               00   :00                           every 12           Medical



                                                  (twelve)           [facility]



                                                  hours for           



                                                  7 days.           



                                                  Indication           



                                                  s: acute           



                                                  pain           

 

     HYDROmorpho      2022- No        4647 4mg       Take 1           Uni

vers



     ne 4 mg                                tablet by           ity of



     tablet      00:00: 04:59                          mouth           [place]



               00   :00                           every 12           Medical



                                                  (twelve)           [facility]



                                                  hours for           



                                                  7 days.           



                                                  Indication           



                                                  s: acute           



                                                  pain           

 

     ciprofloxac      2022- No        [id number] 500mg      Take 1         

  [facility]



     in HCl 500                                tablet by           ity

 of



     mg tablet      00:00: 00:00                          mouth           [place]



               00   :00                           every 12           Medical



                                                  (twelve)           [facility]



                                                  hours for           



                                                  10 days.           

 

     NaCl 0.9%      2022- No             1000mL      at 150           Uni

vers



     (NS) bolus                                mL/hr,           ity of



     infusion      23:15: 23:38                          1,000 mL,           [facility]

as



     1,000 mL      00   :00                           IV             Medical



                                                  Piggyback,           [facility]



                                                  ONCE, 1           



                                                  dose, On           



                                                  22 at           



                                                  1815, STAT           

 

     HYDROmorpho      2022- No             1mg       1 mg, Slow          

 [facility]



     ne                                  IV Push,           ity of



     (DILAUDID)      22:15: 21:46                          ONCE, 1           [facility]

as



     injection 1      00   :00                           dose, On           Medi

[place]



     mg                                           Fri            [facility]



                                                  22 at           



                                                  1715,           



                                                  Routine<br           



                                                  >Use           



                                                  approved           



                                                  by             



                                                  (Faculty):           



                                                  ADC            



                                                  PROVIDER           

 

     enoxaparin            Yes            40mg      40 mg,           Unive

rs



     (LOVENOX)                                     Subcutaneo           ity 

of



     injection      22:00:                               us, DAILY,           [facility]

[place]



     40 mg      00                                 First dose           Medical



                                                  on Fri           [facility]



                                                  22 at           



                                                  1700,           



                                                  Until           



                                                  Discontinu           



                                                  ed,            



                                                  Routine           

 

     glucagon      -0      Yes            1mg       1 mg,           [facility]



     (GLUCAGEN      24                               Intravenou           ity 

of



     DIAGNOSTIC      21:57:                               s, PRN -           [facility]

as



     KIT)      53                                 SEE            Medical



     injection 1                                         INSTRUCTIO           Br

anch



     mg                                           NS,            



                                                  Starting           



                                                  on 22 at           



                                                  1657,           



                                                  Until           



                                                  Discontinu           



                                                  ed,            



                                                  Routine,           



                                                  hypoglycem           



                                                  ia             

 

     glucagon      -0      Yes            1mg       1 mg,           [facility]



     (GLUCAGEN                                     Intramuscu           ity 

of



     DIAGNOSTIC      21:57:                               lar, PRN,           [facility]

[place]



     KIT)      44                                 Starting           Medical



     injection 1                                         on Fri           [facility]



     mg                                           22 at           



                                                  1657,           



                                                  Until           



                                                  Discontinu           



                                                  ed, ASAP,           



                                                  Blood           



                                                  Glucose           



                                                  &amp;lt;           



                                                  or = 70           



                                                  mg/dL and           



                                                  patient is           



                                                  unable to           



                                                  swallow or           



                                                  has mental           



                                                  changes.           

 

     HYDROmorpho      -0      Yes            4mg       4 mg,           Un[facility]

rs



     ne                                       Oral,           ity of



     (DILAUDID)      13:00:                               Q12H,           [place]



     tablet 4 mg      00                                 First dose           Me

dical



                                                  on Fri           [facility]



                                                  22 at           



                                                  0800,           



                                                  Until           



                                                  Discontinu           



                                                  ed             

 

     insulin      -0      Yes            15U       15 Units,           Unive

rs



     lispro                                     Subcutaneo           ity of



     (human)      12:30:                               us, TIDAC,           [place]

[place]



     (HumaLOG      00                                 First dose           Medic

al



     U-100)                                         on Fri           [facility]



     injection                                         22 at           



     15 Units                                         0730,           



                                                  Until           



                                                  Discontinu           



                                                  ed,            



                                                  Routine           

 

     amLODIPine      -0      Yes            10mg      10 mg,           Unive

rs



     (NORVASC)                                     Oral,           ity of



     tablet 10      08:45:                               DAILY,           [place]



     mg        00                                 First dose           Medical



                                                  on Fri           [facility]



                                                  22 at           



                                                  0345,           



                                                  Until           



                                                  Discontinu           



                                                  ed,            



                                                  Routine           

 

     ertapenem      -0 202- No             1000mg      1,000 mg,           

[facility]



     (INVANZ)       06-25                          IV             ity of



     1,000 mg in      08:45: 13:51                          Piggyback,          

 [place]



     NaCl 0.9%      00   :26                           Q24H ABX,           Medic

al



     (NS) 50 mL                                         First dose           Bra

nch



     MINI-BAG                                         on 22 at           



                                                  0345,           



                                                  Until           



                                                  Discontinu           



                                                  ed,            



                                                  Administer           



                                                  over 30           



                                                  Minutes,           



                                                  50             



                                                  mL<br>Reas           



                                                  on for           



                                                  Anti-Infec           



                                                  tive:           



                                                  Empiric           



                                                  Therapy           



                                                  for            



                                                  Suspected           



                                                  Infection<           



                                                  br>Empiric           



                                                  Therapy           



                                                  Site:           



                                                  Urine<br>D           



                                                  uration of           



                                                  therapy: 7           



                                                  days<br>Re           



                                                  stricted           



                                                  use            



                                                  approved           



                                                  by:            



                                                  History of           



                                                  ESBL           



                                                  infection           



                                                  in past 3           



                                                  months           

 

     hydralAZINE            Yes            10mg      10 mg,           Univ

ers



     (APRESOLINE                                     Slow IV           ity o

f



     ) injection      08:33:                               Push,           [place]



     10 mg      28                                 Q6HPRN,           Medical



                                                  Starting           [facility]



                                                  on 22 at           



                                                  0333,           



                                                  Until           



                                                  Discontinu           



                                                  ed,            



                                                  Routine,           



                                                  DBP=&gt;10           



                                                  0;             



                                                  SBP=>160,           



                                                  For SBP >           



                                                  160            

 

     acetaminoph            Yes            650mg      650 mg,           Un

[facility]



     en                                       Oral,           ity of



     (TYLENOL)      07:37:                               Q6HPRN,           [place]



     tablet 650      37                                 Starting           Medic

al



     mg                                           on Fri           [facility]



                                                  22 at           



                                                  0237,           



                                                  Until           



                                                  Discontinu           



                                                  ed,            



                                                  Routine,           



                                                  Pain           



                                                  (scale           



                                                  1-3)           

 

     iopamidol      2022- No        [id number] 100mL      100 mL,          

 [facility]



     (ISOVUE                                Intravenou           ity o

f



     370-500 mL)      06:15: 05:05                          s, ONCE, 1          

 [place]



     injection      00   :00                           dose, On           Medica

l



     100 mL                                         Fri            [facility]



                                                  22 at           



                                                  0115,           



                                                  Routine           

 

     insulin      2022- No             10U       10 Units,           Univ

ers



     regular                                IV Push,           ity of



     human      06:00: 05:20                          ONCE, 1           [place]



     (HUMULIN R)      00   :00                           dose, On           Medi

[facility]



     injection                                         Fri            [facility]



     10 Units                                         22 at           



                                                  0100, ASAP           

 

     FENTanyl PF      2022- No             50ug      50 mcg,           Un

[facility]



     (SUBLIMAZE                                Slow IV           ity o

f



     (PF))      05:45: 04:47                          Push,           [place]



     injection      00   :00                           ONCE, 1           Medical



     50 mcg                                         dose, On           [facility]



                                                  Fri            



                                                  22 at           



                                                  0045, STAT           

 

     FENTanyl PF      2022- No             50ug      50 mcg,           Un

[facility]



     (SUBLIMAZE                                Slow IV           ity o

f



     (PF))      04:15: 04:00                          Push,           [place]



     injection      00   :00                           ONCE, 1           Medical



     50 mcg                                         dose, On           [facility]



                                                  u            



                                                  22 at           



                                                  2315, STAT           

 

     NaCl 0.9%      2022- No             1000mL      at 999           Uni

vers



     (NS) bolus      624 06-24                          mL/hr,           ity of



     infusion      04:15: 06:41                          1,000 mL,           [facility]

as



     1,000 mL      00   :00                           IV             Medical



                                                  Infusion,           [facility]



                                                  ONCE, 1           



                                                  dose, On           



                                                  u            



                                                  22 at           



                                                  2315, STAT           

 

     hydromorpho            Yes            4ug       Take 4 mcg           

[facility]



     ne HCl      6-10                               by mouth           ity of



     (HYDROMORPH      19:50:                               every 12           [facility]

[facility]



     ONE ORAL)      08                                 (twelve)           Medica

l



                                                  hours.           [place]

 

     magnesium            Yes            400mg      400 mg,           Un[facility]

ers



     oxide      6-10                               Oral,           ity of



     (MAG-OX      14:00:                               DAILY,           [place]



     400) tablet      00                                 First dose           Me

dical



     400 mg                                         on Fri           [place]



                                                  6/10/22 at           



                                                  0900,           



                                                  Until           



                                                  Discontinu           



                                                  ed,            



                                                  Routine           

 

     HYDROmorpho      2022- No             1mg       1 mg, Slow          

 [facility]



     ne        6-10 06-10                          IV Push,           ity of



     (DILAUDID)      05:15: 04:42                          ONCE, 1           [facility]

as



     injection 1      00   :00                           dose, On           Medi

sar[facility]



     mg                                           Fri            [place]



                                                  6/10/22 at           



                                                  0015,           



                                                  Routine<br           



                                                  >Use           



                                                  approved           



                                                  by             



                                                  (Faculty):           



                                                  ADC            



                                                  PROVIDER           

 

     acetaminoph            Yes            1000mg      1,000 mg,          

 [facility]



     en        6-10                               Oral, Q8H,           ity of



     (TYLENOL)      03:00:                               First dose           [facility]

[facility]



     tablet      00                                 on u           Medical



     1,000 mg                                         22 at           [facility]



                                                  2200,           



                                                  Until           



                                                  Discontinu           



                                                  ed,            



                                                  Routine           

 

     insulin            Yes       [id number] 52U       inject 52           U

nivers



     glargine      6-10                               Units           ity of



     100 unit/mL      00:00:                               under the           [facility]

[facility]



     injection      00                                 skin at           Medical



                                                  bedtime.           [facility]

 

     insulin      0      Yes       [id number] 52U       inject 52           U

nivers



     glargine      6-10                               Units           ity of



     100 unit/mL      00:00:                               under the           T

[facility]



     injection      00                                 skin at           Medical



                                                  bedtime.           [facility]

 

     insulin      0      Yes       [id number] 52U       inject 52           U

nivers



     glargine      6-10                               Units           ity of



     100 unit/mL      00:00:                               under the           T

[facility]



     injection      00                                 skin at           Medical



                                                  bedtime.           [facility]

 

     insulin      -0      Yes       [id number] 52U       inject 52           U

nivers



     glargine      6-10                               Units           ity of



     100 unit/mL      00:00:                               under the           T

[facility]



     injection      00                                 skin at           Medical



                                                  bedtime.           [facility]

 

     insulin      -0      Yes       [id number] 52U       inject 52           U

nivers



     glargine      6-10                               Units           ity of



     100 unit/mL      00:00:                               under the           T

[facility]



     injection      00                                 skin at           Medical



                                                  bedtime.           [facility]

 

     insulin      -0      Yes       [id number] 52U       inject 52           U

nivers



     glargine      6-10                               Units           ity of



     100 unit/mL      00:00:                               under the           T

[facility]



     injection      00                                 skin at           Medical



                                                  bedtime.           [facility]

 

     insulin      -0      Yes       [id number] 52U       inject 52           U

nivers



     glargine      6-10                               Units           ity of



     100 unit/mL      00:00:                               under the           T

[facility]



     injection      00                                 skin at           Medical



                                                  bedtime.           [facility]

 

     insulin      -0      Yes       [id number] 52U       inject 52           U

nivers



     glargine      6-10                               Units           ity of



     100 unit/mL      00:00:                               under the           T

[facility]



     injection      00                                 skin at           Medical



                                                  bedtime.           [facility]

 

     insulin      -0 - No        [id number] 52U       inject 52           

[facility]



     glargine      6-10 07-30                          Units           ity of



     100 unit/mL      00:00: 00:00                          under the           

[place]



     injection      00   :00                           skin at           Medical



                                                  bedtime.           [facility]

 

     insulin      -0 - No        [id number] 52U       inject 52           

[facility]



     glargine      6-10 07-30                          Units           ity of



     100 unit/mL      00:00: 00:00                          under the           

[place]



     injection      00   :00                           skin at           Medical



                                                  bedtime.           [facility]

 

     Insulin      -0 - No        [id number]           Use as           Uni

vers



     Safety      6-10 07-09                          directed           ity of



     [place],      00:00: 04:59                                         [place]



     Disp, 29      00   :00                                          Medical



     gauge x                                                        Branch



     3/16" Ndle                                                        

 

     Insulin      -2022- No        [id number]           Use as           Uni

vers



     Safety      6-10 07-09                          directed           ity of



     [place],      00:00: 04:59                                         [place]



     Disp, 29      00   :00                                          Medical



     gauge x                                                        Br[facility]



     3/16" Ndle                                                        

 

     Insulin      -2022- No        [id number]           Use as           Uni

vers



     Safety      6-10 07-09                          directed           ity of



     [place],      00:00: 04:59                                         [place]



     Disp, 29      00   :00                                          Medical



     gauge x                                                        Branch



     3/16" Ndle                                                        

 

     Insulin      2022- No        [id number]           Use as           Uni

vers



     Safety      6-10 07-09                          directed           ity of



     [place],      00:00: 04:59                                         [place]



     Disp, 29      00   :00                                          Medical



     gauge x                                                        Branch



     3/16" Ndle                                                        

 

     Insulin      2022- No        [id number]           Use as           Uni

vers



     Safety      6-10 07-09                          directed           ity of



     [place],      00:00: 04:59                                         [place]



     Disp, 29      00   :00                                          Medical



     gauge x                                                        Branch



     3/16" Ndle                                                        

 

     Insulin      2022- No        [id number]           Use as           Uni

vers



     Safety      6-10 07-09                          directed           ity of



     [place],      00:00: 04:59                                         [place]



     Disp, 29      00   :00                                          Medical



     gauge x                                                        Branch



     3/16" Ndle                                                        

 

     Insulin      2022- No        [id number]           Use as           Uni

vers



     Safety      6-10 07-09                          directed           ity of



     [place],      00:00: 04:59                                         [place]



     Disp, 29      00   :00                                          Medical



     gauge x                                                        Branch



     3/16" Ndle                                                        

 

     fluconazole      2022- No        [id number] 200mg      Take 1         

  [facility]



     200 mg      6-10 06-23                          tablet by           ity of



     tablet      00:00: 04:59                          mouth           [place]



               00   :00                           daily for           Medical



                                                  12 days.           [facility]

 

     magnesium      2022- No             4g        4 g, IV           Univ

ers



     sulfate in                                Piggyback,           it

y of



     water 4      20:45: 21:58                          ONCE, 1           [place]



     gram/50 mL      00   :00                           dose, On           Medic

al



     (8 %) IV                                         u 22           Br[facility]

h



     Piggyback 4                                         at 1545,           



     g                                            Routine           

 

     HYDROmorpho            Yes            4mg       4 mg,           Unive

rs



     ne                                       Oral,           ity of



     (DILAUDID)      19:32:                               Q6HPRN,           [place]

s



     tablet 4 mg      47                                 Starting           Medi

sarah



                                                  on Thu           Branch



                                                  22 at           



                                                  1432,           



                                                  Until           



                                                  Discontinu           



                                                  ed,            



                                                  Routine,           



                                                  Pain           



                                                  (scale           



                                                  7-10)           

 

     insulin            Yes            30U       30 Units,           Unive

rs



     glargine                                     Subcutaneo           ity o

f



     (LANTUS      02:00:                               , QHS,           [place]



     U-100)      00                                 First dose           Medical



     injection                                         on Wed           Branch



     30 Units                                         22 at           



                                                  2100,           



                                                  Until           



                                                  Discontinu           



                                                  ed,            



                                                  Routine           

 

     HYDROmorpho      2022- No             .5mg      0.5 mg,           Un

[facility]



     ne        -                          Slow IV           ity of



     (DILAUDID)      16:28: 19:33                          Push,           [place]



     injection      03   :06                           Q4HPRN,           Medical



     0.5 mg                                         Starting           Branch



                                                  on 22 at           



                                                  1128,           



                                                  Until Thu           



                                                  22 at           



                                                  1433,           



                                                  Routine,           



                                                  Pain           



                                                  (scale           



                                                  7-10)<br>U           



                                                  se             



                                                  approved           



                                                  by             



                                                  (Faculty):           



                                                  ADC            



                                                  PROVIDER           

 

     fluconazole            Yes            200mg      200 mg,           Un

[facility]



     (DIFLUCAN)                                     Oral,           ity of



     tablet 200      14:45:                               DAILY,           [place]



     mg        00                                 First dose           Medical



                                                  on Wed           Branch



                                                  22 at           



                                                  0945,           



                                                  Until           



                                                  Discontinu           



                                                  ed,            



                                                  ASAP<br>Re           



                                                  ason for           



                                                  Anti-Infec           



                                                  tive:           



                                                  Empiric           



                                                  Therapy           



                                                  for            



                                                  Suspected           



                                                  Infection<           



                                                  br>Empiric           



                                                  Therapy           



                                                  Site:           



                                                  Urine<br>D           



                                                  uration of           



                                                  therapy:           



                                                  72 hours           

 

     enoxaparin            Yes            40mg      40 mg,           Unive

rs



     (LOVENOX)                                     Subcutaneo           ity 

of



     injection      14:00:                               us, DAILY,           [facility]

[place]



     40 mg      00                                 First dose           Medical



                                                  on Wed           Branch



                                                  22 at           



                                                  0900,           



                                                  Until           



                                                  Discontinu           



                                                  ed,            



                                                  Routine           

 

     tamsulosin            Yes            .4mg      0.4 mg,           Univ

ers



     (FLOMAX)                                     Oral,           ity of



     capsule 0.4      14:00:                               DAILY,           [place]

[place]



     mg        00                                 First dose           Medical



                                                  on Wed           [facility]



                                                  22 at           



                                                  0900,           



                                                  Until           



                                                  Discontinu           



                                                  ed,            



                                                  Routine           

 

     insulin      2022- No             10U       10 Units,           Univ

ers



     glargine                                Subcutaneo           ity 

of



     (LANTUS      12:00: 12:51                          us, ONCE,           [place]

[facility]



     U-100)      00   :00                           1 dose, On           Medical



     injection                                         22           [facility]

ch



     10 Units                                         at 0700,           



                                                  Routine           

 

     HYDROmorpho      2022- No             1mg       1 mg, Slow          

 [facility]



     ne        -                          IV Push,           ity of



     (DILAUDID)      10:00: 09:23                          ONCE, 1           [facility]

as



     injection 1      00   :00                           dose, On           Medi

[facility]



     mg                                           22           [facility]



                                                  at 0500,           



                                                  Routine<br           



                                                  >Use           



                                                  approved           



                                                  by             



                                                  (Faculty):           



                                                  ADC            



                                                  PROVIDER           

 

     HYDROmorpho      2022- No             4mg       4 mg,           Univ

ers



     ne                                  Oral,           ity of



     (DILAUDID)      08:40: 16:28                          [id number],           [facility]

[place]



     tablet 4 mg      04   :14                           Starting           Medi

sarah



                                                  on Wed           [facility]



                                                  22 at           



                                                  0340,           



                                                  Until 22 at           



                                                  1128,           



                                                  Routine,           



                                                  Pain           



                                                  (scale           



                                                  7-10)           

 

     HYDROmorpho      2022- No             1mg       1 mg, Slow          

 [facility]



     ne                                  IV Push,           ity of



     (DILAUDID)      06:15: 05:41                          ONCE, 1           [facility]

as



     injection 1      00   :00                           dose, On           Medi

sarah



     mg                                           22           Branch



                                                  at 0115,           



                                                  Routine<br           



                                                  >Use           



                                                  approved           



                                                  by             



                                                  (Faculty):           



                                                  ADC            



                                                  PROVIDER           

 

     aztreonam      2022- No             1000mg      1,000 mg,           

[facility]



     (AZACTAM)       06-10                          IV             ity of



     1,000 mg in      05:30: 16:58                          Piggyback,          

 [place]



     NaCl 0.9%      00   :42                           Q8H ABX,           Medica

l



     (NS) 100 mL                                         First dose           Br

anch



     MINI-BAG                                         on 22 at           



                                                  0030,           



                                                  Until           



                                                  Discontinu           



                                                  ed,            



                                                  Administer           



                                                  over 30           



                                                  Minutes,           



                                                  100            



                                                  mL<br>Reas           



                                                  on for           



                                                  Anti-Infec           



                                                  tive:           



                                                  Empiric           



                                                  Therapy           



                                                  for            



                                                  Suspected           



                                                  Infection<           



                                                  br>Empiric           



                                                  Therapy           



                                                  Site:           



                                                  Urine<br>D           



                                                  uration of           



                                                  therapy: 7           



                                                  days           

 

     Sliding            Yes                      Subcutaneo           [facility]

ers



     Scale      6-08                               us, Q4H,           ity of



     Insulin-Reg      05:00:                               First dose           

[place]



     ular + Fsbg      00                                 on Wed           Medica

l



     Testing                                         22 at           Branch



                                                  0000,           



                                                  Until           



                                                  Discontinu           



                                                  ed,            



                                                  Routine           

 

     NaCl 0.9%      2022- No             1000mL      at 125           Uni

vers



     (NS) IV       06-08                          mL/hr, IV           ity of



     infusion      04:30: 16:28                          Infusion,           Eric

as



     1,000 mL      00   :27                           CONTINUOUS           Medic

al



                                                  , Starting           Branch



                                                  on 22 at           



                                                  2330,           



                                                  Until 22 at           



                                                  1128,           



                                                  Routine           

 

     glucagon            Yes            1mg       1 mg,           [facility]



     (GLUCAGEN                                     Intravenou           ity 

of



     DIAGNOSTIC      04:10:                               s, PRN -           Eric

as



     KIT)      53                                 SEE            Medical



     injection 1                                         INSTRUCTIO           Br

anch



     mg                                           NS,            



                                                  Starting           



                                                  on 22 at           



                                                  2310,           



                                                  Until           



                                                  Discontinu           



                                                  ed,            



                                                  Routine,           



                                                  For Blood           



                                                  glucose <           



                                                  70             

 

     proCHLORper            Yes            10mg      10 mg,           Univ

ers



     azine                                     Slow IV           ity of



     (COMPAZINE)      04:10:                               Push,           [place]



     injection      40                                 Q6HPRN,           Medical



     10 mg                                         Starting           Branch



                                                  on 22 at           



                                                  2310,           



                                                  Until           



                                                  Discontinu           



                                                  ed,            



                                                  Routine,           



                                                  Nausea and           



                                                  Vomiting           



                                                  (N/V)           

 

     FENTanyl PF      2022- No             50ug      50 mcg,           Un

[facility]



     (SUBLIMAZE                                Slow IV           ity o

f



     (PF))      03:04: 03:37                          Push,           [place]



     injection      00   :00                           ONCE, 1           Medical



     50 mcg                                         dose, On           Branch



                                                  22           



                                                  at 2215,           



                                                  STAT           

 

     NaCl 0.9%      2022- No             1000mL      at 999           Uni

vers



     (NS) IV      08                          mL/hr,           ity of



     infusion      01:16: 01:54                          Intravenou           Te

[place]



     1,000 mL      00   :00                           s, ONCE, 1           Medic

al



                                                  dose, On           Branch



                                                  22           



                                                  at 2030,           



                                                  Routine           

 

     insulin      2022- No             .1U/kg      13.7 Units           U

nivers



     regular      08                          (rounded           ity of



     human      01:15: 01:51                          from 13.74           [place]



     (HUMULIN R)      00   :00                           Units =           Medic

al



     injection                                         0.1            Branch



     13.7 Units                                         Units/kg           



                                                  ?137.4           



                                                  kg), Slow           



                                                  IV Push,           



                                                  ONCE, 1           



                                                  dose, On           



                                                  22           



                                                  at 2030,           



                                                  STAT           

 

     FENTanyl PF      2022- No             50ug      50 mcg,           Un

[facility]



     (SUBLIMAZE                                Slow IV           ity o

f



     (PF))      01:00: 01:52                          Push,           [place]



     injection      00   :00                           ONCE, 1           Medical



     50 mcg                                         dose, On           Branch



                                                  22           



                                                  at 2015,           



                                                  ASAP           

 

     NaCl 0.9%      2022- No             1000mL      at 999           Uni

vers



     (NS) IV      6-07 06-08                          mL/hr,           ity of



     infusion      23:47: 00:53                          Intravenou           Te

[place]



     1,000 mL      00   :00                           s, ONCE, 1           Medic

al



                                                  dose, On           Branch



                                                  22           



                                                  at 1900,           



                                                  ASAP           

 

     hydromorpho      0      Yes            4ug       Take 4 mcg           

[facility]



     ne HCl      6-07                               by mouth           ity of



     (HYDROMORPH      23:17:                               every 12           Te

[facility]



     ONE ORAL)      17                                 (twelve)           Medica

l



                                                  hours.           Branch

 

     hydromorpho      0      Yes            4ug       Take 4 mcg           

[facility]



     ne HCl      6-06                               by mouth           ity of



     (HYDROMORPH      18:29:                               every 12           Te

[facility]



     ONE ORAL)      31                                 (twelve)           Medica

l



                                                  hours.           Branch

 

     insulin      0      Yes        15U       inject 15           U

nivers



     lispro,      6-06                               Units           ity of



     human, 100      00:00:                               under the           Te

[facility]



     unit/mL      00                                 skin 3           Medical



     injection                                         (three)           Branch



                                                  times           



                                                  daily           



                                                  before           



                                                  meals.           

 

     insulin      0      Yes        15U       inject 15           U

nivers



     lispro,      6-06                               Units           ity of



     human, 100      00:00:                               under the           Te

[place]



     unit/mL      00                                 skin 3           Medical



     injection                                         (three)           Branch



                                                  times           



                                                  daily           



                                                  before           



                                                  meals.           

 

     insulin      -0      Yes        15U       inject 15           U

nivers



     lispro,      6-06                               Units           ity of



     human, 100      00:00:                               under the           Te

[place]



     unit/mL      00                                 skin 3           Medical



     injection                                         (three)           Branch



                                                  times           



                                                  daily           



                                                  before           



                                                  meals.           

 

     insulin      0      Yes        15U       inject 15           U

nivers



     lispro,      6-06                               Units           ity of



     human, 100      00:00:                               under the           Te

[place]



     unit/mL      00                                 skin 3           Medical



     injection                                         (three)           Branch



                                                  times           



                                                  daily           



                                                  before           



                                                  meals.           

 

     insulin      -0      Yes        15U       inject 15           U

nivers



     lispro,      6-06                               Units           ity of



     human, 100      00:00:                               under the           Te

[facility]as



     unit/mL      00                                 skin 3           Medical



     injection                                         (three)           Branch



                                                  times           



                                                  daily           



                                                  before           



                                                  meals.           

 

     insulin      -0      Yes        15U       inject 15           U

nivers



     lispro,      6-06                               Units           ity of



     human, 100      00:00:                               under the           [facility]

[place]



     unit/mL      00                                 skin 3           Medical



     injection                                         (three)           [facility]



                                                  times           



                                                  daily           



                                                  before           



                                                  meals.           

 

     insulin      2022-0      Yes       [id number] 52U       inject 52           U

nivers



     glargine      6-06                               Units           ity of



     100 unit/mL      00:00:                               under the           [facility]

[facility]



     injection      00                                 skin at           Medical



                                                  bedtime.           Branch

 

     insulin      0      Yes       [id number] 15U       inject 15           U

nivers



     lispro,      6-06                               Units           ity of



     human, 100      00:00:                               under the           [facility]

[place]



     unit/mL      00                                 skin 3           Medical



     injection                                         (three)           [facility]anch



                                                  times           



                                                  daily           



                                                  before           



                                                  meals.           

 

     insulin      0      Yes       [id number] 52U       inject 52           U

nivers



     glargine      6-06                               Units           ity of



     100 unit/mL      00:00:                               under the           T

[facility]



     injection      00                                 skin at           Medical



                                                  bedtime.           Branch

 

     insulin            Yes       [id number] 15U       inject 15           U

nivers



     lispro,      6-06                               Units           ity of



     human, 100      00:00:                               under the           Te

[facility]



     unit/mL      00                                 skin 3           Medical



     injection                                         (three)           [facility]anch



                                                  times           



                                                  daily           



                                                  before           



                                                  meals.           

 

     insulin            Yes       [id number] 15U       inject 15           U

nivers



     lispro,      6-06                               Units           ity of



     human, 100      00:00:                               under the           Te

[facility]



     unit/mL      00                                 skin 3           Medical



     injection                                         (three)           Branch



                                                  times           



                                                  daily           



                                                  before           



                                                  meals.           

 

     insulin            Yes       [id number] 15U       inject 15           U

nivers



     lispro,      6-06                               Units           ity of



     human, 100      00:00:                               under the           [facility]

[facility]



     unit/mL      00                                 skin 3           Medical



     injection                                         (three)           Branch



                                                  times           



                                                  daily           



                                                  before           



                                                  meals.           

 

     insulin      2022- No        [id number] 15U       inject 15           

[facility]



     lispro,      6-06 07-30                          Units           ity of



     human, 100      00:00: 00:00                          under the           T

[facility]



     unit/mL      00   :00                           skin 3           Medical



     injection                                         (three)           [facility]



                                                  times           



                                                  daily           



                                                  before           



                                                  meals.           

 

     insulin      2022- No        [id number] 15U       inject 15           

[facility]



     lispro,      6-06 07-30                          Units           ity of



     human, 100      00:00: 00:00                          under the           T

[facility]



     unit/mL      00   :00                           skin 3           Medical



     injection                                         (three)           Branch



                                                  times           



                                                  daily           



                                                  before           



                                                  meals.           

 

     insulin      2022- No        [id number] 52U       inject 52           

[facility]



     glargine      6-06 06-10                          Units           ity of



     100 unit/mL      00:00: 00:00                          under the           

[place]



     injection      00   :00                           skin at           Medical



                                                  bedtime.           Branch

 

     HYDROmorpho      2022- No             .5mg      0.5 mg,           Un

[facility]



     ne         06-06                          Slow IV           ity of



     (DILAUDID)      18:15: 18:14                          Push,           [place]



     injection      00   :00                           Q8HPRN,           Medical



     0.5 mg                                         Starting           Branch



                                                  on Sun           



                                                  22 at           



                                                  1315,           



                                                  Until Mon           



                                                  22 at           



                                                  1314,           



                                                  Routine,           



                                                  Pain           



                                                  (scale           



                                                  7-10)<br>U           



                                                  se             



                                                  approved           



                                                  by             



                                                  (Faculty):           



                                                  [facility]            



                                                  PROVIDER           

 

     insulin            Yes            .4U/kg/      52 Units           Uni

vers



     glargine      605                     d         (rounded           ity of



     (LANTUS      14:43:                               from 51.6           [place]



     U-100)      43                                 Units =           Medical



     injection                                         0.4            Branch



     52 Units                                         Units/kg/d           



                                                  ay ?129           



                                                  kg),           



                                                  Subcutaneo           



                                                  us, Q24H,           



                                                  First dose           



                                                  on Sun           



                                                  22 at           



                                                  0944,           



                                                  Until           



                                                  Discontinu           



                                                  ed,            



                                                  Routine           

 

     HYDROmorpho            Yes            4mg       4 mg,           Unive

rs



     ne        05                               Oral,           ity of



     (DILAUDID)      02:21:                               Q6HPRN,           [place]

[facility]



     tablet 4 mg      55                                 Starting           Medi

[facility]



                                                  on Sat           Branch



                                                  22 at           



                                                  2121,           



                                                  Until           



                                                  Discontinu           



                                                  ed,            



                                                  Routine,           



                                                  Pain           



                                                  (scale           



                                                  4-6)           

 

     HYDROmorpho      2022- No             1mg       1 mg, Slow          

 [facility]



     ne        6 06-05                          IV Push,           ity of



     (DILAUDID)      02:21: 18:05                          Q4HPRN,           [facility]

as



     injection 1      39   :59                           Starting           Medi

[facility]



     mg                                           on Sat           Branch



                                                  22 at           



                                                  2121,           



                                                  Until Sun           



                                                  22 at           



                                                  1305,           



                                                  Routine,           



                                                  Pain           



                                                  (scale           



                                                  7-10)<br>U           



                                                  se             



                                                  approved           



                                                  by             



                                                  (Faculty):           



                                                  [facility]            



                                                  PROVIDER           

 

     Sliding            Yes                      Subcutaneo           Univ

ers



     Scale      6-04                               us, Q4H,           ity of



     Insulin -      17:00:                               First dose           Te

[facility]



     lispro      00                                 on Sat           Medical



     (humaLOG) +                                         22 at           [facility]



     Fsbg                                         1200,           



     Testing                                         Until           



                                                  Discontinu           



                                                  ed,            



                                                  Routine           

 

     insulin      -      Yes            .35U/kg      15 Units           Uni

vers



     lispro      6-04                     /d        (rounded           ity of



     (human)      17:00:                               from 15.05           [place]

[facility]



     (HumaLOG      00                                 Units =           Medical



     U-100)                                         0.35           Branch



     injection                                         Units/kg/d           



     15 Units                                         ay ?129           



                                                  kg),           



                                                  Subcutaneo           



                                                  us, TID           



                                                  MEALS,           



                                                  First dose           



                                                  on Sat           



                                                  22 at           



                                                  1200,           



                                                  Until           



                                                  Discontinu           



                                                  ed,            



                                                  Routine           

 

     proMETHazin            Yes            25mg      25 mg,           Univ

ers



     e                                        Oral,           ity of



     (PHENERGAN)      15:09:                               Q6HPRN,           Eric

as



     tablet 25      59                                 Starting           Medica

l



     mg                                           on Sat           Branch



                                                  22 at           



                                                  1009,           



                                                  Until           



                                                  Discontinu           



                                                  ed,            



                                                  Routine,           



                                                  Nausea and           



                                                  Vomiting           



                                                  (N/V)           

 

     HYDROmorpho       202- No             4mg       4 mg,           Univ

ers



     ne        05                          Oral,           ity of



     (DILAUDID)      14:47: 02:22                          Q6HPRN,           [facility]

as



     tablet 4 mg      19   :08                           Starting           Medi

[facility]



                                                  on Sat           Branch



                                                  22 at           



                                                  0947,           



                                                  Until Sat           



                                                  22 at           



                                                  2122,           



                                                  Routine,           



                                                  Pain           



                                                  (scale           



                                                  7-10)           

 

     potassium      2022- No             20meq      20 mEq, IV           

[facility]



     chloride 20      04                          Piggyback,           i

ty of



     mEq/100 mL      23:45: 03:11                          Q2H, 2           [place]

[facility]



     (KCL) 20      00   :00                           doses,           Medical



     mEq/100 mL                                         First dose           Bra

[facility]



     RTU IVPB 20                                         on Fri           



     mEq                                          6/3/22 at           



                                                  1845, Last           



                                                  dose on           



                                                  Fri 6/3/22           



                                                  at 2000,           



                                                  100 mL           

 

     HYDROmorpho      2022- No             1mg       1 mg, Slow          

 [facility]



     ne                                  IV Push,           ity of



     (DILAUDID)      18:08: 14:45                          Q4HPRN,           Eric

as



     injection 1      31   :34                           Starting           Medi

[facility]



     mg                                           on Fri           Branch



                                                  6/3/22 at           



                                                  1308,           



                                                  Until Sat           



                                                  22 at           



                                                  0945,           



                                                  Routine,           



                                                  Pain           



                                                  (scale           



                                                  7-10)<br>U           



                                                  se             



                                                  approved           



                                                  by             



                                                  (Faculty):           



                                                  [facility]            



                                                  PROVIDER           

 

     enoxaparin            Yes            40mg      40 mg,           Unive

rs



     (LOVENOX)                                     Subcutaneo           ity 

of



     injection      14:00:                               us, DAILY,           Te

[place]



     40 mg      00                                 First dose           Medical



                                                  on Fri           Branch



                                                  6/3/22 at           



                                                  0900,           



                                                  Until           



                                                  Discontinu           



                                                  ed,            



                                                  Routine           

 

     lactobacill            Yes            .5mg      0.5 mg,           Uni

vers



     us                                       Oral, BID,           ity of



     acidophilus      13:00:                               First dose           

[place]



     tablet 0.5      00                                 on Fri           Medical



     mg                                           6/3/22 at           Branch



                                                  0800,           



                                                  Until           



                                                  Discontinu           



                                                  ed,            



                                                  Routine           

 

     docusate            Yes            100mg      100 mg,           Unive

rs



     (COLACE)                                     Oral, BID,           ity o

f



     capsule 100      13:00:                               First dose           

[place]



     mg        00                                 on Fri           Medical



                                                  6/3/22 at           Branch



                                                  0800,           



                                                  Until           



                                                  Discontinu           



                                                  ed,            



                                                  Routine           

 

     cefTRIAXone      2022- No             1000mg      1,000 mg,         

  [facility]



     (ROCEPHIN)                                IV             ity of



     1,000 mg in      11:30: 16:04                          Piggyback,          

 [place]



     NaCl 0.9%      00   :40                           Q24H ABX,           Medic

al



     (NS) 50 mL                                         First dose           Bra

[facility]



     MINI-BAG                                         on Fri           



                                                  6/3/22 at           



                                                  0630,           



                                                  Until           



                                                  Discontinu           



                                                  ed,            



                                                  Administer           



                                                  over 30           



                                                  Minutes,           



                                                  50             



                                                  mL<br>Reas           



                                                  on for           



                                                  Anti-Infec           



                                                  tive:           



                                                  Documented           



                                                  Infection<           



                                                  br>Documen           



                                                  huma            



                                                  Infection           



                                                  Site:           



                                                  Urine<br>D           



                                                  uration of           



                                                  Therapy: 7           



                                                  days           

 

     proMETHazin       No             25mg      25 mg, IV           

[facility]



     e                                   Piggyback,           ity of



     (PHENERGAN)      11:22: 15:10                          Q6HPRN,           Te

[place]



     25 mg in      38   :15                           Starting           Medical



     NaCl 0.9%                                         on Fri           Branch



     (NS) 50 mL                                         6/3/22 at           



     IV                                           0622,           



     piggyback                                         Until Sat           



                                                  22 at           



                                                  1010,           



                                                  Routine,           



                                                  Nausea and           



                                                  Vomiting           



                                                  (N/V)           

 

     acetaminoph            Yes            650mg      650 mg,           Un

[place]



     en                                       Oral,           ity of



     (TYLENOL)      11:04:                               Q6HPRN,           [place]



     tablet 650      03                                 Starting           Medic

al



     mg                                           on Fri           Branch



                                                  6/3/22 at           



                                                  0604,           



                                                  Until           



                                                  Discontinu           



                                                  ed,            



                                                  Routine,           



                                                  Pain           



                                                  (scale           



                                                  1-3), Temp           



                                                  > 38.5 C           

 

     HYDROmorpho      2022- No             2mg       2 mg, Slow          

 [facility]



     ne                                  IV Push,           ity of



     (DILAUDID)      11:03: 18:08                          Q4HPRN,           Eric

as



     injection 2      22   :44                           Starting           Medi

sarah



     mg                                           on Fri           [facility]



                                                  6/3/22 at           



                                                  0603,           



                                                  Until Fri           



                                                  6/3/22 at           



                                                  1308,           



                                                  Routine,           



                                                  Pain           



                                                  (scale           



                                                  7-10)<br>U           



                                                  se             



                                                  approved           



                                                  by             



                                                  (Faculty):           



                                                  CLC            



                                                  PROVIDER           

 

     NaCl 0.9%            Yes            10mL      10 mL,           Univer

s



     (NS)                                     Slow IV           ity of



     injection      10:58:                               Push, PRN,           [facility]

[place]



     10 mL      34                                 Starting           Medical



                                                  on Fri           Branch



                                                  6/3/22 at           



                                                  0558,           



                                                  Until           



                                                  Discontinu           



                                                  ed,            



                                                  Routine,           



                                                  line           



                                                  maintenanc           



                                                  e              

 

     NaCl 0.45%      2022- No             1000mL                     Univ

ers



     (1/2NS)                                               ity of



     1000 mL +      10:30: 14:45                                         [place]



     KCL 20 mEq      00   :34                                          Medical



                                                                 Branch

 

     D5W 0.45%       No                       IV             [facility]



     NaCl                                Infusion,           ity of



     (1/2NS) 1 L      10:21: 14:45                          at 200           Eric

as



     + KCL 20      33   :34                           mL/hr, PRN           Medic

al



     mEq                                          - SEE           Branch



                                                  INSTRUCTIO           



                                                  NS,            



                                                  Starting           



                                                  on Fri           



                                                  6/3/22 at           



                                                  0521,           



                                                  Until Sat           



                                                  22 at           



                                                  0945,           



                                                  ASAP,           



                                                  Blood           



                                                  glucose           



                                                  control           

 

     acetaminoph       No             1000mg      1,000 mg,         

  [facility]



     en                                  Oral,           ity of



     (TYLENOL)      08:45: 07:36                          ONCE, 1           [place]

[facility]



     tablet      00   :00                           dose, On           Medical



     1,000 mg                                         Fri 6/3/22           [facility]

h



                                                  at 0345,           



                                                  ASAP           

 

     insulin      2022- No             8U        8 Units,           Unive

rs



     regular                                Slow IV           ity of



     human      08:15: 07:13                          Push,           [place]



     (HUMULIN R)      00   :00                           ONCE, 1           Medic

al



     injection 8                                         dose, On           [facility]

ch



     Units                                         Fri 6/3/22           



                                                  at 0315,           



                                                  Routine           

 

     NaCl 0.9%      2022- No             1000mL      at 999           Uni

vers



     (NS) bolus                                mL/hr,           ity of



     infusion      07:15: 06:12                          1,000 mL,           Eric

as



     1,000 mL      00   :00                           IV             Medical



                                                  Infusion,           [facility]



                                                  ONCE, 1           



                                                  dose, On           



                                                  Fri 6/3/22           



                                                  at 0215,           



                                                  STAT           

 

     insulin      2022- No             8U        8 Units,           Unive

rs



     regular                                Slow IV           ity of



     human      07:15: 06:21                          Push,           [place]



     (HUMULIN R)      00   :00                           ONCE, 1           Medic

al



     injection 8                                         dose, On           [facility]

ch



     Units                                         Fri 6/3/22           



                                                  at 0215,           



                                                  STAT           

 

     iopamidol      2022- No        [id number] 100mL      100 mL,          

 [facility]



     (ISOVUE                                Intravenou           ity o

f



     370-500 mL)      05:45: 04:31                          s, ONCE, 1          

 [place]



     injection      00   :00                           dose, On           Medica

l



     100 mL                                         Fri 6/3/22           [facility]



                                                  at 0045,           



                                                  Routine           

 

     meropenem      2022- No             500mg      500 mg, IV           

[facility]



     (MERREM)                                Piggyback,           ity 

of



     500 mg in      05:00: 05:32                          ONCE, 1           [place]

s



     NaCl 0.9%      00   :00                           dose, On           Medica

l



     (NS) 50 mL                                         Fri 6/3/22           [facility]



     MINI-BAG                                         at 0000,           



                                                  Administer           



                                                  over 30           



                                                  Minutes,           



                                                  50             



                                                  mL<br&gt;R           



                                                  estricted           



                                                  use            



                                                  approved           



                                                  by:            



                                                  EMERGENCY           



                                                  DEPARTMENT           



                                                  PRESCRIBER           



                                                  <br>Reason           



                                                  for            



                                                  Anti-Infec           



                                                  tive:           



                                                  Empiric           



                                                  Therapy           



                                                  for            



                                                  Suspected           



                                                  Infection<           



                                                  br>Empiric           



                                                  Therapy           



                                                  Site:           



                                                  Abdominal<           



                                                  br>Duratio           



                                                  n of           



                                                  therapy:           



                                                  72 hours           

 

     proMETHazin       No             25mg      25 mg, IV           

Univers



     e                                   Piggyback,           ity of



     (PHENERGAN)      04:45: 03:49                          ONCE, 1           Te

[facility]



     25 mg in      00   :00                           dose, On           Medical



     NaCl 0.9%                                         u 22           Bran

ch



     (NS) 50 mL                                         at 2345,           



     IV                                           ASAP           



     piggyback                                                        

 

     NaCl 0.9%      2022- No             1000mL      at 999           Uni

vers



     (NS) bolus                                mL/hr,           ity of



     infusion      04:30: 06:04                          1,000 mL,           Eric

as



     1,000 mL      00   :00                           IV             Medical



                                                  Infusion,           Branch



                                                  ONCE, 1           



                                                  dose, On           



                                                  Thu 22           



                                                  at 2330,           



                                                  STAT           

 

     FENTanyl PF      2022- No             100ug      100 mcg,           

Univers



     (SUBLIMAZE      6-03 06-03                          Slow IV           ity o

f



     (PF))      03:30: 03:24                          Push,           [place]



     injection      00   :00                           ONCE, 1           Medical



     100 mcg                                         dose, On           Branch



                                                  Thu 22           



                                                  at 2230,           



                                                  STAT           

 

     cefTRIAXone            Yes            2000mg      2,000 mg,          

 [facility]



     (ROCEPHIN)      5-17                               Intramuscu           ity

 of



     injection      21:00:                               lar, Q24H,           Te

[facility]



     2,000 mg      00                                 First dose           Medic

al



                                                  on Tue           Branch



                                                  22 at           



                                                  1600,           



                                                  Until           



                                                  Discontinu           



                                                  ed,            



                                                  ASAP<br>Re           



                                                  ason for           



                                                  Anti-Infec           



                                                  tive:           



                                                  Documented           



                                                  Infection<           



                                                  br>Documen           



                                                  huma            



                                                  Infection           



                                                  Site: Skin           



                                                  / Soft           



                                                  Tissue<br>           



                                                  Duration           



                                                  of             



                                                  Therapy:           



                                                  Other (see           



                                                  Comments)           

 

     hydromorpho            Yes            4ug       Take 4 mcg           

Univers



     ne HCl      5-17                               by mouth           ity of



     (HYDROMORPH      17:43:                               every 12           Te

x[facility]



     ONE ORAL)      55                                 (twelve)           Medica

l



                                                  hours.           Branch

 

     hydromorpho            Yes            4ug       Take 4 mcg           

[facility]



     ne HCl      5-17                               by mouth           ity of



     (HYDROMORPH      17:43:                               every 12           Te

x[facility]



     ONE ORAL)      55                                 (twelve)           Medica

l



                                                  hours.           Branch

 

     collagenase            Yes       [id number]           Apply to         

  Univers



     250       5-17                               affected           ity of



     unit/gram      00:00:                               area(s)           [place]



     ointment      00                                 daily.           Medical



                                                                 Branch

 

     collagenase            Yes       [id number]           Apply to         

  [facility]



     250       5-17                               affected           ity of



     unit/gram      00:00:                               area(s)           [place]



     ointment      00                                 daily.           Medical



                                                                 Branch

 

     collagenase            Yes       [id number]           Apply to         

  [facility]



     250       5-17                               affected           ity of



     unit/gram      00:00:                               area(s)           [place]



     ointment      00                                 daily.           Medical



                                                                 Branch

 

     collagenase      0      Yes       [id number]           Apply to         

  Univers



     250       5-17                               affected           ity of



     unit/gram      00:00:                               area(s)           [place]



     ointment      00                                 daily.           Medical



                                                                 Branch

 

     collagenase      0      Yes       [id number]           Apply to         

  [facility]



     250       5-17                               affected           ity of



     unit/gram      00:00:                               area(s)           [place]



     ointment      00                                 daily.           Medical



                                                                 Branch

 

     collagenase            Yes       [id number]           Apply to         

  Univers



     250       5-17                               affected           ity of



     unit/gram      00:00:                               area(s)           [place]



     ointment      00                                 daily.           Medical



                                                                 Branch

 

     collagenase      -0      Yes       [id number]           Apply to         

  Univers



     250       5-17                               affected           ity of



     unit/gram      00:00:                               area(s)           [place]



     ointment      00                                 daily.           Medical



                                                                 Branch

 

     collagenase      -0      Yes       [id number]           Apply to         

  Univers



     250       5-17                               affected           ity of



     unit/gram      00:00:                               area(s)           [place]



     ointment      00                                 daily.           Medical



                                                                 Branch

 

     collagenase      -0      Yes       [id number]           Apply to         

  Univers



     250       5-17                               affected           ity of



     unit/gram      00:00:                               area(s)           [place]



     ointment      00                                 daily.           Medical



                                                                 Branch

 

     collagenase      -0      Yes       [id number]           Apply to         

  Univers



     250       5-17                               affected           ity of



     unit/gram      00:00:                               area(s)           [place]



     ointment      00                                 daily.           Medical



                                                                 Branch

 

     collagenase      -0      Yes       [id number]           Apply to         

  Univers



     250       5-17                               affected           ity of



     unit/gram      00:00:                               area(s)           [place]



     ointment      00                                 daily.           Medical



                                                                 Branch

 

     collagenase      -0      Yes       [id number]           Apply to         

  Univers



     250       5-17                               affected           ity of



     unit/gram      00:00:                               area(s)           [place]



     ointment      00                                 daily.           Medical



                                                                 Branch

 

     collagenase      -0      Yes       [id number]           Apply to         

  Univers



     250       5-17                               affected           ity of



     unit/gram      00:00:                               area(s)           [place]



     ointment      00                                 daily.           Medical



                                                                 Branch

 

     collagenase      -0      Yes       [id number]           Apply to         

  Univers



     250       5-17                               affected           ity of



     unit/gram      00:00:                               area(s)           [place]



     ointment      00                                 daily.           Medical



                                                                 Branch

 

     collagenase      -0      Yes       [id number]           Apply to         

  Univers



     250       5-17                               affected           ity of



     unit/gram      00:00:                               area(s)           [place]



     ointment      00                                 daily.           Medical



                                                                 Branch

 

     collagenase      -0      Yes       [id number]           Apply to         

  [facility]



     250       5-17                               affected           ity of



     unit/gram      00:00:                               area(s)           [place]



     ointment      00                                 daily.           Medical



                                                                 Branch

 

     collagenase      -0      Yes       [id number]           Apply to         

  Univers



     250       5-17                               affected           ity of



     unit/gram      00:00:                               area(s)           [place]



     ointment      00                                 daily.           Medical



                                                                 Branch

 

     collagenase      -0      Yes       [id number]           Apply to         

  [facility]



     250       5-17                               affected           ity of



     unit/gram      00:00:                               area(s)           [place]



     ointment      00                                 daily.           Medical



                                                                 Branch

 

     collagenase      -0      Yes       [id number]           Apply to         

  Univers



     250       5-17                               affected           ity of



     unit/gram      00:00:                               area(s)           [place]



     ointment      00                                 daily.           Medical



                                                                 Branch

 

     collagenase      -0      Yes       [id number]           Apply  to        

   Univers



     250       5-17                               affected           ity of



     unit/gram      00:00:                               area(s)           [place]



     ointment      00                                 daily.           Medical



                                                                 Branch

 

     collagenase      -0      Yes       [id number]           Apply to         

  [facility]



     250       5-17                               affected           ity of



     unit/gram      00:00:                               area(s)           [place]



     ointment      00                                 daily.           Medical



                                                                 Branch

 

     collagenase      -0      Yes       [id number]           Apply to         

  Univers



     250       5-17                               affected           ity of



     unit/gram      00:00:                               area(s)           [place]



     ointment      00                                 daily.           Medical



                                                                 [facility]

 

     collagenase      -0      Yes       [id number]           [facility] to         

  [facility]



     250       5-17                               affected           ity of



     unit/gram      00:00:                               area(s)           [place]



     ointment      00                                 daily.           Medical



                                                                 [facility]

 

     collagenase      -0      Yes       [id number]           Apply to         

  [facility]



     250       5-17                               affected           ity of



     unit/gram      00:00:                               area(s)           [place]



     ointment      00                                 daily.           Medical



                                                                 [facility]

 

     collagenase      -0      Yes       [id number]           Apply to         

  [facility]



     250       5-17                               affected           ity of



     unit/gram      00:00:                               area(s)           [place]



     ointment      00                                 daily.           Medical



                                                                 [facility]

 

     collagenase      -0      Yes       [id number]           Apply to         

  [facility]



     250       5-17                               affected           ity of



     unit/gram      00:00:                               area(s)           [place]



     ointment      00                                 daily.           Medical



                                                                 [facility]

 

     collagenase      -0      Yes       [id number]           Apply to         

  [facility]



     250       5-17                               affected           ity of



     unit/gram      00:00:                               area(s)           [place]



     ointment      00                                 daily.           Medical



                                                                 [facility]

 

     collagenase      -0      Yes       [id number]           Apply to         

  [facility]



     250       5-17                               affected           ity of



     unit/gram      00:00:                               area(s)           [place]



     ointment      00                                 daily.           Medical



                                                                 [facility]

 

     collagenase      -0      Yes       [id number]           Apply to         

  [facility]



     250       5-17                               affected           ity of



     unit/gram      00:00:                               area(s)           [place]



     ointment      00                                 daily.           Medical



                                                                 [facility]

 

     collagenase      -0      Yes       [id number]           Apply to         

  [facility]



     250       5-17                               affected           ity of



     unit/gram      00:00:                               area(s)           [place]



     ointment      00                                 daily.           Medical



                                                                 [facility]

 

     collagenase      -0      Yes       [id number]           Apply to         

  [facility]



     250       5-17                               affected           ity of



     unit/gram      00:00:                               area(s)           [place]



     ointment      00                                 daily.           Medical



                                                                 [facility]

 

     collagenase      -0      Yes       [id number]           Apply to         

  [facility]



     250       5-17                               affected           ity of



     unit/gram      00:00:                               area(s)           [place]



     ointment      00                                 daily.           Medical



                                                                 [facility]

 

     collagenase      -0      Yes       [id number]           Apply to         

  [facility]



     250       5-17                               affected           ity of



     unit/gram      00:00:                               area(s)           [place]



     ointment      00                                 daily.           Medical



                                                                 [facility]

 

     collagenase      -0      Yes       [id number]           Apply to         

  [facility]



     250       5-17                               affected           ity of



     unit/gram      00:00:                               area(s)           [place]



     ointment      00                                 daily.           Medical



                                                                 [facility]

 

     collagenase      -0      Yes       [id number]           Apply to         

  [facility]



     250       5-17                               affected           ity of



     unit/gram      00:00:                               area(s)           [place]



     ointment      00                                 daily.           Medical



                                                                 [facility]

 

     collagenase      -0      Yes       [id number]           Apply to         

  [facility]



     250       5-17                               affected           ity of



     unit/gram      00:00:                               area(s)           [place]



     ointment      00                                 daily.           Medical



                                                                 [facility]

 

     collagenase      -0      Yes       [id number]           Apply to         

  [facility]



     250       5-17                               affected           ity of



     unit/gram      00:00:                               area(s)           [place]



     ointment      00                                 daily.           Medical



                                                                 [facility]

 

     collagenase      -0      Yes       [id number]           Apply to         

  [facility]



     250       5-17                               affected           ity of



     unit/gram      00:00:                               area(s)           [place]



     ointment      00                                 daily.           Medical



                                                                 [facility]

 

     collagenase      -0      Yes       [id number]           Apply to         

  [facility]



     250       5-17                               affected           ity of



     unit/gram      00:00:                               area(s)           [place]



     ointment      00                                 daily.           Medical



                                                                 [facility]

 

     collagenase      -0      Yes       [id number]           Apply to         

  [facility]



     250       5-17                               affected           ity of



     unit/gram      00:00:                               area(s)           [place]



     ointment      00                                 daily.           Medical



                                                                 [facility]

 

     collagenase      -0      Yes       [id number]           Apply to         

  [facility]



     250       5-17                               affected           ity of



     unit/gram      00:00:                               area(s)           [place]



     ointment      00                                 daily.           Medical



                                                                 [facility]

 

     collagenase      -0      Yes       [id number]           Apply to         

  [facility]



     250       5-17                               affected           ity of



     unit/gram      00:00:                               area(s)           [place]



     ointment      00                                 daily.           [facility]



                                                                 [facility]

 

     collagenase      -0      Yes       [id number]           Apply to         

  [facility]



     250       5-17                               affected           ity of



     unit/gram      00:00:                               area(s)           [place]



     ointment      00                                 daily.           Medical



                                                                 [facility]

 

     collagenase      -0      Yes       [id number]           Apply to         

  [facility]



     250       5-17                               affected           ity of



     unit/gram      00:00:                               area(s)           [place]



     ointment      00                                 daily.           [facility]



                                                                 [facility]

 

     collagenase      -0      Yes       [id number]           Apply to         

  [facility]



     250       5-17                               affected           ity of



     unit/gram      00:00:                               area(s)           [place]



     ointment      00                                 daily.           [facility]



                                                                 [facility]

 

     collagenase      -0      Yes       [id number]           Apply to         

  [facility]



     250       5-17                               affected           ity of



     unit/gram      00:00:                               area(s)           [place]



     ointment      00                                 daily.           Medical



                                                                 [facility]

 

     collagenase      -0      Yes       [id number]           Apply to         

  [facility]



     250       5-17                               affected           ity of



     unit/gram      00:00:                               area(s)           [place]



     ointment      00                                 daily.           [facility]



                                                                 [facility]

 

     collagenase      -0      Yes       [id number]           Apply to         

  [facility]



     250       5-17                               affected           ity of



     unit/gram      00:00:                               area(s)           [place]



     ointment      00                                 daily.           Medical



                                                                 [facility]

 

     collagenase      -0      Yes       [id number]           Apply to         

  [facility]



     250       5-17                               affected           ity of



     unit/gram      00:00:                               area(s)           [place]



     ointment      00                                 daily.           Medical



                                                                 [facility]

 

     collagenase      -0      Yes       [id number]           Apply to         

  [facility]



     250       5-17                               affected           ity of



     unit/gram      00:00:                               area(s)           [place]



     ointment      00                                 daily.           [facility]



                                                                 [facility]

 

     collagenase      -0      Yes       [id number]           Apply to         

  [facility]



     250       5-17                               affected           ity of



     unit/gram      00:00:                               area(s)           [place]



     ointment      00                                 daily.           [facility]



                                                                 [facility]

 

     collagenase      -0      Yes       [id number]           Apply to         

  [facility]



     250       5-17                               affected           ity of



     unit/gram      00:00:                               area(s)           [place]



     ointment      00                                 daily.           [facility]



                                                                 [facility]

 

     collagenase      -0      Yes       [id number]           Apply to         

  [facility]



     250       5-17                               affected           ity of



     unit/gram      00:00:                               area(s)           [place]



     ointment      00                                 daily.           [facility]



                                                                 [facility]

 

     collagenase      -0      Yes       [id number]           Apply to         

  [facility]



     250       5-17                               affected           ity of



     unit/gram      00:00:                               area(s)           [place]



     ointment      00                                 daily.           [facility]



                                                                 [facility]

 

     collagenase      -0      Yes       [id number]           Apply to         

  [facility]



     250       5-17                               affected           ity of



     unit/gram      00:00:                               area(s)           [place]



     ointment      00                                 daily.           [facility]



                                                                 [facility]

 

     collagenase      -0      Yes       [id number]           Apply to         

  [facility]



     250       5-17                               affected           ity of



     unit/gram      00:00:                               area(s)           [place]



     ointment      00                                 daily.           [facility]



                                                                 [facility]

 

     collagenase      -0      Yes       [id number]           Apply to         

  [facility]



     250       5-17                               affected           ity of



     unit/gram      00:00:                               area(s)           [place]



     ointment      00                                 daily.           [facility]



                                                                 [facility]

 

     collagenase      -0      Yes       [id number]           Apply to         

  [facility]



     250       5-17                               affected           ity of



     unit/gram      00:00:                               area(s)           [place]



     ointment      00                                 daily.           [facility]



                                                                 [facility]

 

     collagenase      -0      Yes       [id number]           Apply to         

  [facility]



     250       5-17                               affected           ity of



     unit/gram      00:00:                               area(s)           [place]



     ointment      00                                 daily.           [facility]



                                                                 [facility]

 

     collagenase      -0      Yes       [id number]           Apply to         

  [facility]



     250       5-17                               affected           ity of



     unit/gram      00:00:                               area(s)           [place]



     ointment      00                                 daily.           [facility]



                                                                 [facility]

 

     collagenase      -0      Yes       [id number]           Apply to         

  [facility]



     250       5-17                               affected           ity of



     unit/gram      00:00:                               area(s)           [place]



     ointment      00                                 daily.           [facility]



                                                                 [facility]

 

     collagenase      -0      Yes       [id number]           Apply to         

  [facility]



     250       5-17                               affected           ity of



     unit/gram      00:00:                               area(s)           [place]



     ointment      00                                 daily.           [facility]



                                                                 [facility]

 

     collagenase      -0      Yes       [id number]           Apply to         

  [facility]



     250       5-17                               affected           ity of



     unit/gram      00:00:                               area(s)           [place]



     ointment      00                                 daily.           [facility]



                                                                 [facility]

 

     collagenase      -0      Yes       [id number]           Apply to         

  [facility]



     250       5-17                               affected           ity of



     unit/gram      00:00:                               area(s)           [place]



     ointment      00                                 daily.           [facility]



                                                                 [facility]

 

     collagenase      -0      Yes       [id number]           Apply to         

  [facility]



     250       5-17                               affected           ity of



     unit/gram      00:00:                               area(s)           [place]



     ointment      00                                 daily.           [facility]



                                                                 [facility]

 

     collagenase      -0      Yes       [id number]           Apply to         

  [facility]



     250       5-17                               affected           ity of



     unit/gram      00:00:                               area(s)           [place]



     ointment      00                                 daily.           [facility]



                                                                 [facility]

 

     collagenase      -0      Yes       [id number]           Apply to         

  [facility]



     250       5-17                               affected           ity of



     unit/gram      00:00:                               area(s)           [place]



     ointment      00                                 daily.           [facility]



                                                                 [facility]

 

     collagenase      -0      Yes       [id number]           Apply to         

  [facility]



     250       5-17                               affected           ity of



     unit/gram      00:00:                               area(s)           [place]



     ointment      00                                 daily.           [facility]



                                                                 [facility]

 

     collagenase      -0      Yes       [id number]           Apply to         

  [facility]



     250       5-17                               affected           ity of



     unit/gram      00:00:                               area(s)           [place]



     ointment      00                                 daily.           [facility]



                                                                 [facility]

 

     collagenase      -0      Yes       [id number]           Apply to         

  [facility]



     250       5-17                               affected           ity of



     unit/gram      00:00:                               area(s)           [place]



     ointment      00                                 daily.           [facility]



                                                                 [facility]

 

     collagenase      -0      Yes       [id number]           Apply to         

  [facility]



     250       5-17                               affected           ity of



     unit/gram      00:00:                               area(s)           [place]



     ointment      00                                 daily.           [facility]



                                                                 [facility]

 

     collagenase      -0      Yes       [id number]           Apply to         

  [facility]



     250       5-17                               affected           ity of



     unit/gram      00:00:                               area(s)           [place]



     ointment      00                                 daily.           [facility]



                                                                 [facility]

 

     collagenase      -0      Yes       [id number]           Apply to         

  [facility]



     250       5-17                               affected           ity of



     unit/gram      00:00:                               area(s)           [place]



     ointment      00                                 daily.           [facility]



                                                                 [facility]

 

     collagenase      -0      Yes       [id number]           Apply to         

  [facility]



     250       5-17                               affected           ity of



     unit/gram      00:00:                               area(s)           [place]



     ointment      00                                 daily.           [facility]



                                                                 [facility]

 

     collagenase      -0      Yes       [id number]           Apply to         

  [facility]



     250       5-17                               affected           ity of



     unit/gram      00:00:                               area(s)           [place]



     ointment      00                                 daily.           [facility]



                                                                 [facility]

 

     collagenase      -0      Yes       [id number]           Apply to         

  [facility]



     250       5-17                               affected           ity of



     unit/gram      00:00:                               area(s)           [place]



     ointment      00                                 daily.           [facility]



                                                                 [facility]

 

     collagenase      -0      Yes       [id number]           Apply to         

  [facility]



     250       5-17                               affected           ity of



     unit/gram      00:00:                               area(s)           [place]



     ointment      00                                 daily.           [facility]



                                                                 [facility]

 

     collagenase      -0      Yes       [id number]           Apply to         

  [facility]



     250       5-17                               affected           ity of



     unit/gram      00:00:                               area(s)           [place]



     ointment      00                                 daily.           [facility]



                                                                 [facility]

 

     collagenase      -0      Yes       [id number]           Apply to         

  [facility]



     250       5-17                               affected           ity of



     unit/gram      00:00:                               area(s)           [place]



     ointment      00                                 daily.           Medical



                                                                 [facility]

 

     collagenase      2022-0      Yes       [id number]           Apply to         

  [facility]



     250       5-17                               affected           ity of



     unit/gram      00:00:                               area(s)           [place]



     ointment      00                                 daily.           [facility]



                                                                 [facility]

 

     collagenase            Yes       [id number]           Apply to         

  [facility]



     250       5-17                               affected           ity of



     unit/gram      00:00:                               area(s)           [place]



     ointment      00                                 daily.           Medical



                                                                 [facility]

 

     docusate      2022- No        [id number] 100mg      Take 1           [facility]

[facility]



     100 mg      5-17 06-17                          capsule by           ity of



     capsule      00:00: 04:59                          mouth 2           [place]



               00   :00                           (two)           Medical



                                                  times           [place]



                                                  daily for           



                                                  30 days.           

 

     lactobacill      2022- No        [id number] .5mg      Take 1          

 [facility]



     us        5-17 06-17                          tablet by           [facility] of



     acidophilus      00:00: 04:59                          mouth 2           [place]

[place]



               00   :00                           (two)           [facility]



                                                  times           [place]



                                                  daily for           



                                                  30 days.           

 

     sennosides      2022- No        [id number] 8.6mg      Take 1          

 [facility]



     8.6 mg      5-17 06-17                          tablet by           ity of



     tablet      00:00: 04:59                          mouth 2           [place]



               00   :00                           (two)           Medical



                                                  times           [place]



                                                  daily for           



                                                  30 days.           

 

     tamsulosin      2022- No        [id number] .4mg      Take 1           

[facility]



     0.4 mg 24      5-17 06-17                          capsule by           ity

 of



     hr capsule      00:00: 04:59                          mouth           [place]



               00   :00                           daily for           Medical



                                                  30 days.           [facility]

 

     docusate      2022- No        [id number] 100mg      Take 1           [facility]

[facility]



     100 mg      5-17 06-17                          capsule by           ity of



     capsule      00:00: 04:59                          mouth 2           [place]



               00   :00                           (two)           [facility]



                                                  times           [facility]



                                                  daily for           



                                                  30 days.           

 

     lactobacill      2022- No        [id number] .5mg      Take 1          

 [facility]



     us        5-17 06-17                          tablet by           ity of



     acidophilus      00:00: 04:59                          mouth 2           [facility]

[place]



               00   :00                           (two)           [facility]



                                                  times           [facility]



                                                  daily for           



                                                  30 days.           

 

     sennosides      2022- No        [id number] 8.6mg      Take 1          

 [facility]



     8.6 mg      5-17 06-17                          tablet by           ity of



     tablet      00:00: 04:59                          mouth 2           [place]



               00   :00                           (two)           Medical



                                                  times           [place]



                                                  daily for           



                                                  30 days.           

 

     tamsulosin      2022- No        [id number] .4mg      Take 1           

[facility]



     0.4 mg 24      5-17 06-17                          capsule by           ity

 of



     hr capsule      00:00: 04:59                          mouth           [place]



               00   :00                           daily for           Medical



                                                  30 days.           [facility]

 

     docusate      2022- No        [id number] 100mg      Take 1           [facility]

[facility]



     100 mg      5-17 06-17                          capsule by           ity of



     capsule      00:00: 04:59                          mouth 2           [place]



               00   :00                           (two)           Medical



                                                  times           [facility]



                                                  daily for           



                                                  30 days.           

 

     lactobacill      2022- No        [id number] .5mg      Take 1          

 [facility]



     us        5-17 06-17                          tablet by           ity of



     acidophilus      00:00: 04:59                          mouth 2           [facility]

[place]



               00   :00                           (two)           [facility]



                                                  times           [facility]



                                                  daily for           



                                                  30 days.           

 

     sennosides      2022- No        [id number] 8.6mg      Take 1          

 [facility]



     8.6 mg      5-17 06-17                          tablet by           ity of



     tablet      00:00: 04:59                          mouth 2           [place]



               00   :00                           (two)           Medical



                                                  times           [place]



                                                  daily for           



                                                  30 days.           

 

     tamsulosin      2022- No        [id number] .4mg      Take 1           

[facility]



     0.4 mg 24      5-17 06-17                          capsule by           ity

 of



     hr capsule      00:00: 04:59                          mouth           [place]



               00   :00                           daily for           Medical



                                                  30 days.           [facility]

 

     docusate      2022- No        [id number] 100mg      Take 1           [facility]

[facility]



     100 mg      5-17 06-17                          capsule by           ity of



     capsule      00:00: 04:59                          mouth 2           [place]



               00   :00                           (two)           [facility]



                                                  times           [facility]



                                                  daily for           



                                                  30 days.           

 

     lactobacill      2022- No        [id number] .5mg      Take 1          

 [facility]



     us        5-17 06-17                          tablet by           ity of



     acidophilus      00:00: 04:59                          mouth 2           [facility]

[place]



               00   :00                           (two)           [facility]



                                                  times           [facility]



                                                  daily for           



                                                  30 days.           

 

     sennosides      2022- No        [id number] 8.6mg      Take 1          

 [facility]



     8.6 mg      5-17 06-17                          tablet by           ity of



     tablet      00:00: 04:59                          mouth 2           [place]



               00   :00                           (two)           Medical



                                                  times           [facility]



                                                  daily for           



                                                  30 days.           

 

     tamsulosin      2022- No        [id number] .4mg      Take 1           

[facility]



     0.4 mg 24      5-17 06-17                          capsule by           ity

 of



     hr capsule      00:00: 04:59                          mouth           [place]



               00   :00                           daily for           Medical



                                                  30 days.           [facility]

 

     docusate      2022- No        [id number] 100mg      Take 1           [facility]

[facility]



     100 mg      5-17 06-17                          capsule by           ity of



     capsule      00:00: 04:59                          mouth 2           [place]



               00   :00                           (two)           Medical



                                                  times           [facility]



                                                  daily for           



                                                  30 days.           

 

     lactobacill      2022- No        [id number] .5mg      Take 1          

 [facility]



     us        5-17 -17                          tablet by           ity of



     acidophilus      00:00: 04:59                          mouth 2           [facility]

[place]



               00   :00                           (two)           [facility]



                                                  times           [facility]



                                                  daily for           



                                                  30 days.           

 

     sennosides      2022- No        [id number] 8.6mg      Take 1          

 [facility]



     8.6 mg      5-17 -17                          tablet by           ity of



     tablet      00:00: 04:59                          mouth 2           [place]



               00   :00                           (two)           Medical



                                                  times           [facility]



                                                  daily for           



                                                  30 days.           

 

     tamsulosin      2022- No        [id number] .4mg      Take 1           

[facility]



     0.4 mg 24      5-17 -17                          capsule by           ity

 of



     hr capsule      00:00: 04:59                          mouth           [place]



               00   :00                           daily for           Medical



                                                  30 days.           [facility]

 

     metroNIDAZO      2022- No        [id number] 500mg      Take 1         

  [facility]



      mg      5-17 05-21                          tablet by           ity 

of



     tablet      00:00: 04:59                          mouth           [place]



               00   :00                           every 12           [facility]



                                                  (twelve)           [facility]



                                                  hours for           



                                                  3 days.           

 

     metroNIDAZO      2022- No        [id number] 500mg      Take 1         

  [facility]



      mg      5-17 05-21                          tablet by           ity 

of



     tablet      00:00: 04:59                          mouth           [place]



               00   :00                           every 12           [facility]



                                                  (twelve)           [facility]



                                                  hours for           



                                                  3 days.           

 

     metroNIDAZO      2022- No             500mg      500 mg,           [facility]

[facility]



     LE (FLAGYL)      -15 -20                          Oral, Q12H           i

ty of



     tablet 500      22:00: 21:59                          ABX, 10           [facility]

[facility]



     mg        00   :00                           doses,           Medical



                                                  First dose           [facility]



                                                  on Sun           



                                                  5/15/22 at           



                                                  1700, Last           



                                                  dose on           



                                                  22 at           



                                                  0500,           



                                                  Routine<br           



                                                  >Reason           



                                                  for            



                                                  Anti-Infec           



                                                  tive:           



                                                  Documented           



                                                  Infection<           



                                                  br>Documen           



                                                  huma            



                                                  Infection           



                                                  Site: Skin           



                                                  / Soft           



                                                  Tissue<br>           



                                                  Duration           



                                                  of             



                                                  Therapy:           



                                                  Other (see           



                                                  Comments)           

 

     cefTRIAXone      2022- No             2g        2 g, IV           [facility]

[facility]



     (ROCEPHIN)      5-15 05-17                          Piggyback,           it

y of



     2 g in NaCl      20:00: 20:03                          Q24H ABX,           

[place]



     0.9% (NS)      00   :21                           3 doses,           [facility]

l



     100 mL                                         First dose           [facility]



     MINI-BAG                                         on Sun           



                                                  5/15/22 at           



                                                  1500, Last           



                                                  dose on           



                                                  22 at           



                                                  1500,           



                                                  Administer           



                                                  over 30           



                                                  Minutes,           



                                                  100            



                                                  mL<br>Reas           



                                                  on for           



                                                  Anti-Infec           



                                                  tive:           



                                                  Documented           



                                                  Infection<           



                                                  br>Documen           



                                                  huma            



                                                  Infection           



                                                  Site: Skin           



                                                  / Soft           



                                                  Tissue<br>           



                                                  Duration           



                                                  of             



                                                  Therapy:           



                                                  Other (see           



                                                  Comments)           

 

     enoxaparin            Yes            30mg      30 mg,           Unive

rs



     (LOVENOX)                                     Subcutaneo           ity 

of



     injection      01:00:                               us, Q12H,           [facility]

[facility]



     30 mg      00                                 First dose           Medical



                                                  on Wed           [facility]



                                                  22 at           



                                                  2000,           



                                                  Until           



                                                  Discontinu           



                                                  ed,            



                                                  Routine           

 

     proMETHazin            Yes            25mg      25 mg, IV           U

n[facility]



     e                                        Piggyback,           ity of



     (PHENERGAN)      22:58:                               Q4HPRN,           [facility]

[facility]



     25 mg in      13                                 Starting           Medical



     NaCl 0.9%                                         on Wed           [facility]



     (NS) 50 mL                                         22 at           



     IV                                           1758,           



     piggyback                                         Until           



                                                  Discontinu           



                                                  ed,            



                                                  Routine,           



                                                  Nausea and           



                                                  Vomiting           



                                                  (N/V)           

 

     collagenase            Yes                      Topical           Uni

vers



     (SANTYL)                                     (Apply To           ity of



     ointment      22:15:                               Affected           [place]



               00                                 Areas),           Medical



                                                  DAILY,           [facility]



                                                  First dose           



                                                  on 22 at           



                                                  1715,           



                                                  Until           



                                                  Discontinu           



                                                  ed,            



                                                  Routine           

 

     HYDROmorpho      2022- No             1mg       1 mg, Slow          

 [facility]



     ne                                  IV Push,           ity of



     (DILAUDID)      21:45: 21:25                          ONCE, 1           [facility]

as



     injection 1      00   :00                           dose, On           Medi

[facility]



     mg                                           Wed            [facility]



                                                  22 at           



                                                  1645,           



                                                  Routine<br           



                                                  >Use           



                                                  approved           



                                                  by             



                                                  (Faculty):           



                                                  ADC            



                                                  PROVIDER           

 

     magnesium      2022- No             2g        2 g, IV           Univ

ers



     sulfate in                                Piggyback,           it

y of



     water 2      15:15: 16:11                          Administer           [facility]

as



     gram/50 mL      00   :00                           over 60           Med[facility]

l



     (4 %)                                         Minutes,           [facility]



     infusion 2                                         ONCE, 1           



     g                                            dose, On           



                                                  22 at           



                                                  1015,           



                                                  Routine           

 

     lactulose      0      Yes            15mL      15 mL,           Un[facility]

s



     (CEPHULAC)                                     Oral,           ity of



     solution 15      14:00:                               DAILY,           [place]

[place]



     mL        00                                 First dose           Medical



                                                  on Wed           [facility]



                                                  22 at           



                                                  0900,           



                                                  Until           



                                                  Discontinu           



                                                  ed,            



                                                  Routine           

 

     vancomycin      -0 202- No             125mg      125 mg,           Un

[facility]



     (FIRVANQ)                                Oral,           ity of



     50 mg/mL      14:00: 16:27                          Q24H,           [place]



     oral      00   :02                           First dose           Medical



     solution                                         on 



     125 mg                                         22 at           



                                                  0900,           



                                                  Until           



                                                  Discontinu           



                                                  ed,            



                                                  Routine<br           



                                                  >Reason           



                                                  for            



                                                  Anti-Infec           



                                                  tive:           



                                                  Empiric           



                                                  Non-Surgic           



                                                  al             



                                                  Prophylaxi           



                                                  s<br>Durat           



                                                  ion of           



                                                  therapy: 7           



                                                  days           

 

     sennosides      0      Yes            8.6mg      8.6 mg,           Uni

vers



     (SENOKOT)                                     Oral, BID,           ity 

of



     tablet 8.6      13:00:                               First dose           [facility]

[place]



     mg        00                                 on Wed           [facility]



                                                  22 at           [facility]



                                                  0800,           



                                                  Until           



                                                  Discontinu           



                                                  ed,            



                                                  Routine           

 

     docusate      0      Yes            100mg      100 mg,           Unive

rs



     (COLACE)                                     Oral, BID,           ity o

f



     capsule 100      13:00:                               First dose           

[place]



     mg        00                                 on Wed           Medical



                                                  5/11/22 at           [facility]



                                                  0800,           



                                                  Until           



                                                  Discontinu           



                                                  ed,            



                                                  Routine           

 

     HYDROmorpho      0      Yes            2mg       2 mg,           Unive

rs



     ne                                       Oral, TID,           ity of



     (DILAUDID)      13:00:                               First dose           [facility]

[facility]



     tablet 2 mg      00                                 on Wed           [facility]

l



                                                  22 at           [facility]



                                                  0800,           



                                                  Until           



                                                  Discontinu           



                                                  ed             

 

     lactobacill      -0      Yes            .5mg      0.5 mg,           Uni

vers



     us        5-11                               Oral, BID,           ity of



     acidophilus      13:00:                               First dose           

[place]



     tablet 0.5      00                                 on Wed           Medical



     mg                                           22 at           [facility]



                                                  0800,           



                                                  Until           



                                                  Discontinu           



                                                  ed,            



                                                  Routine           

 

     ceFEPIme       No             2g        2 g, IV           Unive

rs



     (MAXIPIME)      15                          Piggyback,           it

y of



     2 g in NaCl      11:00: 19:23                          Q8H ABX,           T

[facility]



     0.9% (NS)      00   :40                           First dose           Medi

sarah



     100 mL                                         on Wed           [facility]



     MINI-BAG                                         22 at           



                                                  0600,           



                                                  Until           



                                                  Discontinu           



                                                  ed,            



                                                  Administer           



                                                  over 30           



                                                  Minutes,           



                                                  100            



                                                  mL<br>Reas           



                                                  on for           



                                                  Anti-Infec           



                                                  tive:           



                                                  [facility]           



                                                  Therapy           



                                                  for            



                                                  Suspected           



                                                  Infection<           



                                                  br&gt;Empi           



                                                  alda            



                                                  Therapy           



                                                  Site:           



                                                  Bone<br>Du           



                                                  ration of           



                                                  therapy:           



                                                  72 hours           

 

     NaCl 0.9%      2022- No             1000mL      at 50           Univ

ers



     (NS) IV       05-16                          mL/hr, IV           ity of



     infusion      10:00: 16:41                          Infusion,           Eric

as



     1,000 mL      00   :28                           CONTINUOUS           Medic

al



                                                  , Starting           [facility]



                                                  on 22 at           



                                                  0500,           



                                                  Until Mon           



                                                  22 at           



                                                  1141,           



                                                  Routine           

 

     doxycycline      2022- No             100mg      100 mg, IV         

  [facility]



     (VIBRAMYCIN      14                          Piggyback,           i

ty of



     ) 100 mg in      09:15: 23:11                          Q12H ABX,           

[place]



     NaCl 0.9%      00   :48                           First dose           Medi

sar[facility]



     (NS) 100 mL                                         on 



     MINI-BAG                                         22 at           



                                                  0415,           



                                                  Until           



                                                  Discontinu           



                                                  ed,            



                                                  Administer           



                                                  over 60           



                                                  Minutes,           



                                                  100            



                                                  mL<br>Reas           



                                                  on for           



                                                  Anti-Infec           



                                                  tive:           



                                                  [facility]           



                                                  Therapy           



                                                  for            



                                                  Suspected           



                                                  Infection<           



                                                  br>[facility]           



                                                  Therapy           



                                                  Site:           



                                                  Wound<br>D           



                                                  uration of           



                                                  therapy: 7           



                                                  days           

 

     tamsulosin            Yes            .4mg      0.4 mg,           Univ

ers



     (FLOMAX)                                     Oral,           ity of



     capsule 0.4      08:30:                               DAILY,           [place]

[facility]



     mg        00                                 First dose           [facility]



                                                  on Wed           [facility]



                                                  22 at           



                                                  0330,           



                                                  Until           



                                                  Discontinu           



                                                  ed,            



                                                  Routine           

 

     HYDROmorpho            Yes            1mg       1 mg, Slow           

[facility]



     ne                                       IV Push,           ity of



     (DILAUDID)      06:04:                               Q4HPRN,           [place]

[facility]



     injection 1      07                                 Starting           Medi

[facility]



     mg                                           on Wed           [facility]



                                                  22 at           



                                                  0104,           



                                                  Until           



                                                  Discontinu           



                                                  ed,            



                                                  Routine,           



                                                  Pain           



                                                  (scale           



                                                  7-10)<br>U           



                                                  se             



                                                  approved           



                                                  by             



                                                  (Faculty):           



                                                  ADC            



                                                  PROVIDER           

 

     FENTanyl PF       No             50ug      50 mcg,           Un

[facility]



     (SUBLIMAZE                                Slow IV           ity o

f



     (PF))      05:30: 05:01                          Push,           [place]



     injection      00   :00                           ONCE, 1           Medical



     50 mcg                                         dose, On           [facility]



                                                  22 at           



                                                  0030, STAT           

 

     iopamidol      2022- No        [id number] 150mL      150 mL,         

  [facility]



     (ISOVUE                                Intravenou           ity o

f



     370-500 mL)      04:45: 03:34                          s, ONCE, 1          

 [place]



     injection      00   :00                           dose, On           [facility]

l



     150 mL                                         Tue            [facility]



                                                  5/10/22 at           



                                                  2345,           



                                                  Routine           

 

     ceFEPIme      2022- No             2000mg      2,000 mg,           U

nivers



     (MAXIPIME)                                IV             ity of



     injection      04:15: 04:15                          Piggyback,           T

[facility]



     2,000 mg      00   :00                           ONCE, 1           Medical



                                                  dose, On           [facility]



                                                  Tue            



                                                  5/10/22 at           



                                                  2315,           



                                                  STAT<br>Re           



                                                  ason for           



                                                  Anti-Infec           



                                                  tive:           



                                                  [facility]           



                                                  Therapy           



                                                  for            



                                                  Suspected           



                                                  Infection<           



                                                  br>[facility]           



                                                  Therapy           



                                                  Site:           



                                                  Bone<br>Du           



                                                  ration of           



                                                  therapy:           



                                                  72 hours           

 

     FENTanyl PF      2022- No             75ug      75 mcg,           Un

[facility]



     (SUBLIMAZE                                Slow IV           ity o

f



     (PF))      03:45: 02:47                          Push,           [place]



     injection      00   :00                           ONCE, 1           Medical



     75 mcg                                         dose, On           [facility]



                                                  Tue            



                                                  5/10/22 at           



                                                  2245, STAT           

 

     OXYCODONE      2022- No                       Take by           [facility]

ers



     HCL/ACETAMI                                mouth.           ity o

f



     NOPHEN      03:22: 00:00                                         [place]



     (PERCOCET      50   :00                                          Medical



     ORAL)                                                        [facility]

 

     FENTanyl PF      2022- No             100ug      100 mcg,           

[facility]



     (SUBLIMAZE                                Slow IV           ity o

f



     (PF))      01:30: 01:12                          Push,           [place]



     injection      00   :00                           ONCE, 1           Medical



     100 mcg                                         dose, On           [facility]



                                                  Tue            



                                                  5/10/22 at           



                                                  2030, STAT           

 

     Lovenox            No                       Notes:           [facility]



               4-13                               (Same as:           l



               17:00:                               Lovenox)           [facility]



                                                               

 

     Lovenox      0      No                       Notes:           [facility]



               4-13                               (Same as:           l



               17:00:                               Lovenox)           [facility]



                                                               

 

     Lovenox      0      No                       Notes:           [facility]



               4-13                               (Same as:           l



               17:00:                               Lovenox)           [place]



                                                               

 

     Lovenox      0      No                       Notes:           [facility]



               4-13                               (Same as:           l



               17:00:                               Lovenox)           [facility]



                                                               

 

     Lovenox      0      No                       Notes:           [facility]



               4-13                               (Same as:           l



               17:00:                               Lovenox)           [place]



                                                               

 

     Lovenox      0      No                       Notes:           [facility]



               4-13                               (Same as:           l



               17:00:                               Lovenox)           [facility]



                                                               

 

     Lovenox            No                       Notes:           [facility]



               4-13                               (Same as:           l



               17:00:                               Lovenox)           [place]                                                

 

     Lovenox            No                       Notes:           [facility]



               4-13                               (Same as:           l



               17:00:                               Lovenox)           [facility]                                                

 

     promethazin            No                       Notes:           [facility]

rajeev



     e         4-13                               (Same as:           l



               16:47:                               Phenergan)           [facility]                                                

 

     promethazin      0      No                       Notes:           [facility]

rajeev



     e         4-13                               (Same as:           l



               16:47:                               Phenergan)           [facility]                                                

 

     promethazin      0      No                       Notes:           [facility]

rajeev



     e         4-13                               (Same as:           l



               16:47:                               Phenergan)           [place]                                                

 

     promethazin      0      No                       Notes:           [facility]

rajeev



     e         4-13                               (Same as:           l



               16:47:                               Phenergan)           [place]



                                                               

 

     promethazin      0      No                       Notes:           [facility]

rajeev



     e         4-13                               (Same as:           l



               16:47:                               Phenergan)           [place]



                                                               

 

     promethazin      0      No                       Notes:           [facility]

rajeev



     e         4-13                               (Same as:           l



               16:47:                               Phenergan)           [place]                                                

 

     promethazin      0      No                       Notes:           [facility]

rajeev



     e         4-13                               (Same as:           l



               16:47:                               Phenergan)           [place]



                                                               

 

     promethazin      0      No                       Notes:           [facility]

rajeev



     e         4-13                               (Same as:           l



               16:47:                               Phenergan)           [place]                                                

 

     albuterol            No                       Notes: SEE           Me

moria



     0.083%      4-13                               RT             l



     inhalation      16:46:                               DOCUMENTAT           H

ermann



     solution      00                                 ION (Same           



                                                  as:            



                                                  Proventil)           

 

     albuterol            No                       Notes: SEE           Me

moria



     0.083%      4-13                               RT             l



     inhalation      16:46:                               DOCUMENTAT           H

ermann



     solution      00                                 ION (Same           



                                                  as:            



                                                  Proventil)           

 

     albuterol            No                       Notes: SEE           Me

moria



     0.083%      4-13                               RT             l



     inhalation      16:46:                               DOCUMENTAT           H

ermann



     solution      00                                 ION (Same           



                                                  as:            



                                                  Proventil)           

 

     albuterol            No                       Notes: SEE           Me

moria



     0.083%      4-13                               RT             l



     inhalation      16:46:                               DOCUMENTAT           H

ermann



     solution      00                                 ION (Same           



                                                  as:            



                                                  Proventil)           

 

     albuterol            No                       Notes: SEE           Me

moria



     0.083%      4-13                               RT             l



     inhalation      16:46:                               DOCUMENTAT           H

ermann



     solution      00                                 ION (Same           



                                                  as:            



                                                  Proventil)           

 

     albuterol            No                       Notes: SEE           Me

moria



     0.083%      4-13                               RT             l



     inhalation      16:46:                               DOCUMENTAT           H

ermann



     solution      00                                 ION (Same           



                                                  as:            



                                                  Proventil)           

 

     albuterol            No                       Notes: SEE           Me

moria



     0.083%      4-13                               RT             l



     inhalation      16:46:                               DOCUMENTAT           H

ermann



     solution      00                                 ION (Same           



                                                  as:            



                                                  Proventil)           

 

     albuterol            No                       Notes: SEE           Me

moria



     0.083%      4-13                               RT             l



     inhalation      16:46:                               DOCUMENTAT           H

ermann



     solution      00                                 ION (Same           



                                                  as:            



                                                  Proventil)           

 

     hydromorpho            Yes                      4 mg = 1           Me

moria



     ne 4 mg      4-13                               tab, PO,           l



     oral tablet      16:43:                               Q12H, 0           Her

child



               00                                 Refill(s)           

 

     hydromorpho      -0      Yes                      4 mg = 1           Me

moria



     ne 4 mg      4-13                               tab, PO,           l



     oral tablet      16:43:                               Q12H, 0           Her

child



               00                                 Refill(s)           

 

     hydromorpho      -0      Yes                      4 mg = 1           Me

moria



     ne 4 mg      4-13                               tab, PO,           l



     oral tablet      16:43:                               Q12H, 0           Her

child



               00                                 Refill(s)           

 

     hydromorpho      0      Yes                      4 mg = 1           Me

moria



     ne 4 mg      4-13                               tab, PO,           l



     oral tablet      16:43:                               Q12H, 0           Her

child



               00                                 Refill(s)           

 

     hydromorpho      2-0      Yes                      4 mg = 1           Me

moria



     ne 4 mg      4-13                               tab, PO,           l



     oral tablet      16:43:                               Q12H, 0           Her

child



               00                                 Refill(s)           

 

     hydromorpho      2-0      Yes                      4 mg = 1           Me

moria



     ne 4 mg      4-13                               tab, PO,           l



     oral tablet      16:43:                               Q12H, 0           Her

child



               00                                 Refill(s)           

 

     hydromorpho      2022-0      Yes                      4 mg = 1           Me

moria



     ne 4 mg      4-13                               tab, PO,           l



     oral tablet      16:43:                               Q12H, 0           Her

child



               00                                 Refill(s)           

 

     hydromorpho      2-0      Yes                      4 mg = 1           Me

moria



     ne 4 mg      4-13                               tab, PO,           l



     oral tablet      16:43:                               Q12H, 0           Her

child



               00                                 Refill(s)           

 

     Lantus 100      -0      No                       10 unit,           [facility]

[facility]



     units/mL      4-13                               SUB-Q,           l



               16:40:                               Daily, 0           [facility]



               00                                 Refill(s)           

 

     Lantus 100      -0      No                       10 unit,           [facility]

[facility]



     units/mL      4-13                               SUB-Q,           l



               16:40:                               Daily, 0           [place]



               00                                 Refill(s)           

 

     Lantus 100      -0      No                       10 unit,           [facility]

[facility]



     units/mL      4-13                               SUB-Q,           l



               16:40:                               Daily, 0           [place]



               00                                 Refill(s)           

 

     Lantus 100      -0      No                       10 unit,           [facility]

[facility]



     units/mL      4-13                               SUB-Q,           l



               16:40:                               Daily, 0           [facility]



               00                                 Refill(s)           

 

     Lantus 100      -0      No                       10 unit,           [facility]

[facility]



     units/mL      4-13                               SUB-Q,           l



               16:40:                               Daily, 0           [facility]



               00                                 Refill(s)           

 

     Lantus 100      -0      No                       10 unit,           [facility]

[facility]



     units/mL      4-13                               SUB-Q,           l



               16:40:                               Daily, 0           [facility]



               00                                 Refill(s)           

 

     Lantus 100      -0      No                       10 unit,           [facility]

[facility]



     units/mL      4-13                               SUB-Q,           l



               16:40:                               Daily, 0           [facility]



               00                                 Refill(s)           

 

     Lantus 100      2-0      No                       10 unit,           [facility]

[facility]



     units/mL      4-13                               SUB-Q,           l



               16:40:                               Daily, 0           [place]                                 Refill(s)           

 

     Lantus 100      2022-0      No                       10 unit,           [facility]

[facility]



     units/mL      4-12                               0.1 mL,           l



               14:00:                               Route:           [facility]



                                                SUB-Q,           



                                                  Drug form:           



                                                  SOLN,           



                                                  Daily,           



                                                  Dosing           



                                                  Weight           



                                                  127.727,           



                                                  kg, Start           



                                                  date:           



                                                  22           



                                                  9:00:00           



                                                  CDT,           



                                                  Duration:           



                                                  30 day,           



                                                  Stop date:           



                                                  22           



                                                  9:00:00           



                                                  CDT,           



                                                  Infuse           



                                                  over: 0           



                                                  hr, 0           

 

     Lantus 100      2022-0      No                       10 unit,           [facility]

[facility]



     units/mL      4-12                               0.1 mL,           l



               14:00:                               Route:           [place]



                                                SUB-Q,           



                                                  Drug form:           



                                                  SOLN,           



                                                  Daily,           



                                                  Dosing           



                                                  Weight           



                                                  127.727,           



                                                  kg, Start           



                                                  date:           



                                                  22           



                                                  9:00:00           



                                                  CDT,           



                                                  Duration:           



                                                  30 day,           



                                                  Stop date:           



                                                  22           



                                                  9:00:00           



                                                  CDT,           



                                                  Infuse           



                                                  over: 0           



                                                  hr, 0           

 

     Lantus 100      2022-0      No                       10 unit,           [facility]

[facility]



     units/mL      4-12                               0.1 mL,           l



               14:00:                               Route:                                            SUB-Q,           



                                                  Drug form:           



                                                  SOLN,           



                                                  Daily,           



                                                  Dosing           



                                                  Weight           



                                                  127.727,           



                                                  kg, Start           



                                                  date:           



                                                  22           



                                                  9:00:00           



                                                  CDT,           



                                                  Duration:           



                                                  30 day,           



                                                  Stop date:           



                                                  22           



                                                  9:00:00           



                                                  CDT,           



                                                  Infuse           



                                                  over: 0           



                                                  hr, 0           

 

     Lantus 100      2022-0      No                       10 unit,           [facility]

[facility]



     units/mL      4-12                               0.1 mL,           l



               14:00:                               Route:           [facility]



                                                SUB-Q,           



                                                  Drug form:           



                                                  SOLN,           



                                                  Daily,           



                                                  Dosing           



                                                  Weight           



                                                  127.727,           



                                                  kg, Start           



                                                  date:           



                                                  22           



                                                  9:00:00           



                                                  CDT,           



                                                  Duration:           



                                                  30 day,           



                                                  Stop date:           



                                                  22           



                                                  9:00:00           



                                                  CDT,           



                                                  Infuse           



                                                  over: 0           



                                                  hr, 0           

 

     Lantus 100      2022-0      No                       10 unit,           [facility]

[facility]



     units/mL      4-12                               0.1 mL,           l



               14:00:                               Route:           [place]



                                                SUB-Q,           



                                                  Drug form:           



                                                  SOLN,           



                                                  Daily,           



                                                  Dosing           



                                                  Weight           



                                                  127.727,           



                                                  kg, Start           



                                                  date:           



                                                  22           



                                                  9:00:00           



                                                  CDT,           



                                                  Duration:           



                                                  30 day,           



                                                  Stop date:           



                                                  22           



                                                  9:00:00           



                                                  CDT,           



                                                  Infuse           



                                                  over: 0           



                                                  hr, 0           

 

     Lantus 100      2022-0      No                       10 unit,           [facility]

[facility]



     units/mL      4-12                               0.1 mL,           l



               14:00:                               Route:           [place]



                                                SUB-Q,           



                                                  Drug form:           



                                                  SOLN,           



                                                  Daily,           



                                                  Dosing           



                                                  Weight           



                                                  127.727,           



                                                  kg, Start           



                                                  date:           



                                                  22           



                                                  9:00:00           



                                                  CDT,           



                                                  Duration:           



                                                  30 day,           



                                                  Stop date:           



                                                  22           



                                                  9:00:00           



                                                  CDT,           



                                                  Infuse           



                                                  over: 0           



                                                  hr, 0           

 

     Lantus 100      2022-0      No                       10 unit,           [facility]

[facility]



     units/mL      4-12                               0.1 mL,           l



               14:00:                               Route:           [place]



               00                                 SUB-Q,           



                                                  Drug form:           



                                                  SOLN,           



                                                  Daily,           



                                                  Dosing           



                                                  Weight           



                                                  127.727,           



                                                  kg, Start           



                                                  date:           



                                                  22           



                                                  9:00:00           



                                                  CDT,           



                                                  Duration:           



                                                  30 day,           



                                                  Stop date:           



                                                  22           



                                                  9:00:00           



                                                  CDT,           



                                                  Infuse           



                                                  over: 0           



                                                  hr, 0           

 

     Lantus 100      2022-0      No                       10 unit,           [facility]

[facility]



     units/mL      4-12                               0.1 mL,           l



               14:00:                               Route:           [facility]



               00                                 SUB-Q,           



                                                  Drug form:           



                                                  SOLN,           



                                                  Daily,           



                                                  Dosing           



                                                  Weight           



                                                  127.727,           



                                                  kg, Start           



                                                  date:           



                                                  22           



                                                  9:00:00           



                                                  CDT,           



                                                  Duration:           



                                                  30 day,           



                                                  Stop date:           



                                                  22           



                                                  9:00:00           



                                                  CDT,           



                                                  Infuse           



                                                  over: 0           



                                                  hr, 0           

 

     cefepime +      -0      No                       Notes:           [facility]

[facility]



     sterile      4-12                               (Same As:           l



     water 10 mL      06:00:                               Maxipime)           H

                                 ***            



                                                  MEDICATION           



                                                  WASTE ***           



                                                  Product           



                                                  Size: 1000           



                                                  mg Product           



                                                  Wasted:           



                                                  _0__ mg           

 

     cefepime +      -0      No                       Notes:           [facility]

[facility]



     sterile      4-12                               (Same As:           l



     water 10 mL      06:00:                               Maxipime)                                            ***            



                                                  MEDICATION           



                                                  WASTE ***           



                                                  Product           



                                                  Size: 1000           



                                                  mg Product           



                                                  Wasted:           



                                                  _0__ mg           

 

     cefepime +      -0      No                       Notes:           [facility]

[facility]



     sterile      4-12                               (Same As:           l



     water 10 mL      06:00:                               Maxipime)           [facility]

[facility]



               00                                 ***            



                                                  MEDICATION           



                                                  WASTE ***           



                                                  Product           



                                                  Size: 1000           



                                                  mg Product           



                                                  Wasted:           



                                                  _0__ mg           

 

     cefepime +      2-0      No                       Notes:           [facility]

[facility]



     sterile      4-12                               (Same As:           l



     water 10 mL      06:00:                               Maxipime)           [facility]

[facility]



               00                                 ***            



                                                  MEDICATION           



                                                  WASTE ***           



                                                  Product           



                                                  Size: 1000           



                                                  mg Product           



                                                  Wasted:           



                                                  _0__ mg           

 

     cefepime +      -0      No                       Notes:           [facility]

[facility]



     sterile      4-12                               (Same As:           l



     water 10 mL      06:00:                               Maxipime)           [facility]

[facility]



               00                                 ***            



                                                  MEDICATION           



                                                  WASTE ***           



                                                  Product           



                                                  Size: 1000           



                                                  mg Product           



                                                  Wasted:           



                                                  _0__ mg           

 

     cefepime +      -0      No                       Notes:           [facility]

[facility]



     sterile      4-12                               (Same As:           l



     water 10 mL      06:00:                               Maxipime)           [facility]

[facility]



               00                                 ***            



                                                  MEDICATION           



                                                  WASTE ***           



                                                  Product           



                                                  Size: 1000           



                                                  mg Product           



                                                  Wasted:           



                                                  _0__ mg           

 

     cefepime +      -0      No                       Notes:           [facility]

[facility]



     sterile      4-12                               (Same As:           l



     water 10 mL      06:00:                               Maxipime)           [facility]

[facility]



               00                                 ***            



                                                  MEDICATION           



                                                  WASTE ***           



                                                  Product           



                                                  Size: 1000           



                                                  mg Product           



                                                  Wasted:           



                                                  _0__ mg           

 

     cefepime +      -0      No                       Notes:           [facility]

[facility]



     sterile      4-12                               (Same As:           l



     water 10 mL      06:00:                               Maxipime)           [facility]

[facility]



               00                                 ***            



                                                  MEDICATION           



                                                  WASTE ***           



                                                  Product           



                                                  Size: 1000           



                                                  mg Product           



                                                  Wasted:           



                                                  _0__ mg           

 

     cefepime +      -0      No                       Notes:           [facility]

[facility]



     sterile      4-12                               (Same As:           l



     water 10 mL      05:00:                               Maxipime)           [facility]

[facility]



               00                                 ***            



                                                  MEDICATION           



                                                  WASTE ***           



                                                  Product           



                                                  Size: 1000           



                                                  mg Product           



                                                  Wasted:           



                                                  _0__ mg           

 

     insulin      2-0      No                       Notes:           [facility]



     lispro      4-12                               (Same as:           l



               05:00:                               Humalog)           [place]                                 Roll in           



                                                  palms of           



                                                  hands           



                                                  gently; Do           



                                                  not shake           



                                                  vigorously           



                                                  . WASTE:           



                                                  F/P -           



                                                  Black; E -           



                                                  Municipal           



                                                  Trash Bin           



                                                  Stable for           



                                                  28 days at           



                                                  room           



                                                  temperatur           



                                                  e. Expires           



                                                  in _____           



                                                  days from           



                                                  __________           



                                                  ____Date           

 

     cefepime +      2022-0      No                       Notes:           [facility]

[facility]



     sterile      4-12                               (Same As:           l



     water 10 mL      05:00:                               Maxipime)           [facility]

[facility]



               00                                 ***            



                                                  MEDICATION           



                                                  WASTE ***           



                                                  Product           



                                                  Size: 1000           



                                                  mg Product           



                                                  Wasted:           



                                                  _0__ mg           

 

     insulin            No                       Notes:           [facility]



     lispro      4-12                               (Same as:           l



               05:00:                               Humalog)           [place]



               00                                 Roll in           



                                                  palms of           



                                                  hands           



                                                  gently; Do           



                                                  not shake           



                                                  vigorously           



                                                  . WASTE:           



                                                  F/P -           



                                                  Black; E -           



                                                  Municipal           



                                                  Trash Bin           



                                                  Stable for           



                                                  28 days at           



                                                  room           



                                                  temperatur           



                                                  e. Expires           



                                                  in _____           



                                                  days from           



                                                  __________           



                                                  ____Date           

 

     cefepime +            No                       Notes:           [facility]

[facility]



     sterile      4-12                               (Same As:           l



     water 10 mL      05:00:                               Maxipime)           [facility]

[facility]



               00                                 ***            



                                                  MEDICATION           



                                                  WASTE ***           



                                                  Product           



                                                  Size: 1000           



                                                  mg Product           



                                                  Wasted:           



                                                  _0__ mg           

 

     insulin            No                       Notes:           [facility]



     lispro      4-12                               (Same as:           l



               05:00:                               Humalog)           [place]



               00                                 Roll in           



                                                  palms of           



                                                  hands           



                                                  gently; Do           



                                                  not shake           



                                                  vigorously           



                                                  . WASTE:           



                                                  F/P -           



                                                  Black; E -           



                                                  Municipal           



                                                  Trash Bin           



                                                  Stable for           



                                                  28 days at           



                                                  room           



                                                  temperatur           



                                                  e. Expires           



                                                  in _____           



                                                  days from           



                                                  __________           



                                                  ____Date           

 

     cefepime +            No                       Notes:           [facility]

[facility]



     sterile      4-12                               (Same As:           l



     water 10 mL      05:00:                               Maxipime)           [facility]

[facility]



               00                                 ***            



                                                  MEDICATION           



                                                  WASTE ***           



                                                  Product           



                                                  Size: 1000           



                                                  mg Product           



                                                  Wasted:           



                                                  _0__ mg           

 

     insulin            No                       Notes:           [facility]



     lispro      4-12                               (Same as:           l



               05:00:                               Humalog)           [place]



               00                                 Roll in           



                                                  palms of           



                                                  hands           



                                                  gently; Do           



                                                  not shake           



                                                  vigorously           



                                                  . WASTE:           



                                                  F/P -           



                                                  Black; E -           



                                                  Municipal           



                                                  Trash Bin           



                                                  Stable for           



                                                  28 days at           



                                                  room           



                                                  temperatur           



                                                  e. Expires           



                                                  in _____           



                                                  days from           



                                                  __________           



                                                  ____Date           

 

     cefepime +            No                       Notes:           [facility]

[facility]



     sterile      4-12                               (Same As:           l



     water 10 mL      05:00:                               Maxipime)           [facility]

[facility]



               00                                 ***            



                                                  MEDICATION           



                                                  WASTE ***           



                                                  Product           



                                                  Size: 1000           



                                                  mg Product           



                                                  Wasted:           



                                                  _0__ mg           

 

     insulin            No                       Notes:           [facility]



     lispro      4-12                               (Same as:           l



               05:00:                               Humalog)           [place]



               00                                 Roll in           



                                                  palms of           



                                                  hands           



                                                  gently; Do           



                                                  not shake           



                                                  vigorously           



                                                  . WASTE:           



                                                  F/P -           



                                                  Black; E -           



                                                  Municipal           



                                                  Trash Bin           



                                                  Stable for           



                                                  28 days at           



                                                  room           



                                                  temperatur           



                                                  e. Expires           



                                                  in _____           



                                                  days from           



                                                  __________           



                                                  ____Date           

 

     cefepime +            No                       Notes:           [facility]

[facility]



     sterile      4-12                               (Same As:           l



     water 10 mL      05:00:                               Maxipime)           [facility]

[facility]



               00                                 ***            



                                                  MEDICATION           



                                                  WASTE ***           



                                                  Product           



                                                  Size: 1000           



                                                  mg Product           



                                                  Wasted:           



                                                  _0__ mg           

 

     insulin            No                       Notes:           [facility]



     lispro      4-12                               (Same as:           l



               05:00:                               Humalog)           [place]



               00                                 Roll in           



                                                  palms of           



                                                  hands           



                                                  gently; Do           



                                                  not shake           



                                                  vigorously           



                                                  . WASTE:           



                                                  F/P -           



                                                  Black; E -           



                                                  Municipal           



                                                  Trash Bin           



                                                  Stable for           



                                                  28 days at           



                                                  room           



                                                  temperatur           



                                                  e. Expires           



                                                  in _____           



                                                  days from           



                                                  __________           



                                                  ____Date           

 

     cefepime +            No                       Notes:           [facility]

[facility]



     sterile      4-12                               (Same As:           l



     water 10 mL      05:00:                               Maxipime)           [facility]

[facility]



               00                                 ***            



                                                  MEDICATION           



                                                  WASTE ***           



                                                  Product           



                                                  Size: 1000           



                                                  mg Product           



                                                  Wasted:           



                                                  _0__ mg           

 

     insulin            No                       Notes:           [facility]



     lispro      4-12                               (Same as:           l



               05:00:                               Humalog)           [place]



               00                                 Roll in           



                                                  palms of           



                                                  hands           



                                                  gently; Do           



                                                  not shake           



                                                  vigorously           



                                                  . WASTE:           



                                                  F/P -           



                                                  Black; E -           



                                                  Municipal           



                                                  Trash Bin           



                                                  Stable for           



                                                  28 days at           



                                                  room           



                                                  temperatur           



                                                  e. Expires           



                                                  in _____           



                                                  days from           



                                                  __________           



                                                  ____Date           

 

     cefepime +            No                       Notes:           [facility]

[facility]



     sterile      4-12                               (Same As:           l



     water 10 mL      05:00:                               Maxipime)           [facility]

[facility]



               00                                 ***            



                                                  MEDICATION           



                                                  WASTE ***           



                                                  Product           



                                                  Size: 1000           



                                                  mg Product           



                                                  Wasted:           



                                                  _0__ mg           

 

     insulin            No                       Notes:           [facility]



     lispro      4-12                               (Same as:           l



               05:00:                               Humalog)                                            Roll in           



                                                  palms of           



                                                  hands           



                                                  gently; Do           



                                                  not shake           



                                                  vigorously           



                                                  . WASTE:           



                                                  F/P -           



                                                  Black; E -           



                                                  Municipal           



                                                  Trash Bin           



                                                  Stable for           



                                                  28 days at           



                                                  room           



                                                  temperatur           



                                                  e. Expires           



                                                  in _____           



                                                  days from           



                                                  __________           



                                                  ____Date           

 

     Dilaudid            No                       Notes:           [facility]



               4-11                               (Same as:           l



               16:53:                               Dilaudid)                                                           

 

     Dilaudid            No                       Notes:           [facility]



               4-11                               (Same as:           l



               16:53:                               Dilaudid)                                                           

 

     Dilaudid            No                       Notes:           [facility]



               4-11                               (Same as:           l



               16:53:                               Dilaudid)                                                           

 

     Dilaudid            No                       Notes:           [facility]



               4-11                               (Same as:           l



               16:53:                               Dilaudid)                                                           

 

     Dilaudid            No                       Notes:           [facility]



               4-11                               (Same as:           l



               16:53:                               Dilaudid)                                                           

 

     Dilaudid            No                       Notes:           [facility]



               4-11                               (Same as:           l



               16:53:                               Dilaudid)                                                           

 

     Dilaudid            No                       Notes:           [facility]



               4-11                               (Same as:           l



               16:53:                               Dilaudid)                                                           

 

     Dilaudid            No                       Notes:           [facility]



               4-11                               (Same as:           l



               16:53:                               Dilaudid)                                                           

 

     Lantus 100            No                       10 unit,           [facility]

[facility]



     units/mL      4-11                               0.1 mL,           l



               16:24:                               Route:           [place]



               00                                 SUB-Q,           



                                                  Drug form:           



                                                  SOLN,           



                                                  ONCE,           



                                                  Dosing           



                                                  Weight           



                                                  127.727,           



                                                  kg, Start           



                                                  date:           



                                                  22           



                                                  11:24:00           



                                                  CDT, Stop           



                                                  date:           



                                                  22           



                                                  11:24:00           



                                                  CDT,           



                                                  Infuse           



                                                  over: 0           



                                                  hr, 0           

 

     Lantus 100            No                       10 unit,           [facility]

[facility]



     units/mL      4-11                               0.1 mL,           l



               16:24:                               Route:           [facility]



               00                                 SUB-Q,           



                                                  Drug form:           



                                                  SOLN,           



                                                  ONCE,           



                                                  Dosing           



                                                  Weight           



                                                  127.727,           



                                                  kg, Start           



                                                  date:           



                                                  22           



                                                  11:24:00           



                                                  CDT, Stop           



                                                  date:           



                                                  22           



                                                  11:24:00           



                                                  CDT,           



                                                  Infuse           



                                                  over: 0           



                                                  hr, 0           

 

     Lantus 100      202-0      No                       10 unit,           [facility]

[facility]



     units/mL      4-11                               0.1 mL,           l



               16:24:                               Route:           [facility]                                 SUB-Q,           



                                                  Drug form:           



                                                  SOLN,           



                                                  ONCE,           



                                                  Dosing           



                                                  Weight           



                                                  127.727,           



                                                  kg, Start           



                                                  date:           



                                                  22           



                                                  11:24:00           



                                                  CDT, Stop           



                                                  date:           



                                                  22           



                                                  11:24:00           



                                                  CDT,           



                                                  Infuse           



                                                  over: 0           



                                                  hr, 0           

 

     Lantus 100      2022-0      No                       10 unit,           [facility]

[facility]



     units/mL      4-11                               0.1 mL,           l



               16:24:                               Route:           [facility]                                 SUB-Q,           



                                                  Drug form:           



                                                  SOLN,           



                                                  ONCE,           



                                                  Dosing           



                                                  Weight           



                                                  127.727,           



                                                  kg, Start           



                                                  date:           



                                                  22           



                                                  11:24:00           



                                                  CDT, Stop           



                                                  date:           



                                                  22           



                                                  11:24:00           



                                                  CDT,           



                                                  Infuse           



                                                  over: 0           



                                                  hr, 0           

 

     Lantus 100      -0      No                       10 unit,           [facility]

[facility]



     units/mL      4-11                               0.1 mL,           l



               16:24:                               Route:           [facility]                                 SUB-Q,           



                                                  Drug form:           



                                                  SOLN,           



                                                  ONCE,           



                                                  Dosing           



                                                  Weight           



                                                  127.727,           



                                                  kg, Start           



                                                  date:           



                                                  22           



                                                  11:24:00           



                                                  CDT, Stop           



                                                  date:           



                                                  22           



                                                  11:24:00           



                                                  CDT,           



                                                  Infuse           



                                                  over: 0           



                                                  hr, 0           

 

     Lantus 100      -0      No                       10 unit,           [facility]

[facility]



     units/mL      4-11                               0.1 mL,           l



               16:24:                               Route:           [facility]                                 SUB-Q,           



                                                  Drug form:           



                                                  SOLN,           



                                                  ONCE,           



                                                  Dosing           



                                                  Weight           



                                                  127.727,           



                                                  kg, Start           



                                                  date:           



                                                  22           



                                                  11:24:00           



                                                  CDT, Stop           



                                                  date:           



                                                  22           



                                                  11:24:00           



                                                  CDT,           



                                                  Infuse           



                                                  over: 0           



                                                  hr, 0           

 

     Lantus 100      -0      No                       10 unit,           [facility]

[facility]



     units/mL      4-11                               0.1 mL,           l



               16:24:                               Route:           [facility]                                 SUB-Q,           



                                                  Drug form:           



                                                  SOLN,           



                                                  ONCE,           



                                                  Dosing           



                                                  Weight           



                                                  127.727,           



                                                  kg, Start           



                                                  date:           



                                                  22           



                                                  11:24:00           



                                                  CDT, Stop           



                                                  date:           



                                                  22           



                                                  11:24:00           



                                                  CDT,           



                                                  Infuse           



                                                  over: 0           



                                                  hr, 0           

 

     Lantus 100      -0      No                       10 unit,           [facility]

[facility]



     units/mL      4-11                               0.1 mL,           l



               16:24:                               Route:           [place]



               00                                 SUB-Q,           



                                                  Drug form:           



                                                  SOLN,           



                                                  ONCE,           



                                                  Dosing           



                                                  Weight           



                                                  127.727,           



                                                  kg, Start           



                                                  date:           



                                                  22           



                                                  11:24:00           



                                                  CDT, Stop           



                                                  date:           



                                                  22           



                                                  11:24:00           



                                                  CDT,           



                                                  Infuse           



                                                  over: 0           



                                                  hr, 0           

 

     NS (Bolus)      -0      No                       500 mL,           [facility]

[facility]



     IV        4-11                               500 ml/hr,           l



               15:15:                               Infuse           [facility]



               00                                 Over: 1           



                                                  hr, Route:           



                                                  IV, 500,           



                                                  Drug form:           



                                                  INJ, ONCE,           



                                                  Priority:           



                                                  STAT,           



                                                  Dosing           



                                                  Weight           



                                                  127.727           



                                                  kg, Start           



                                                  date:           



                                                  22           



                                                  10:15:00           



                                                  CDT, Stop           



                                                  date:           



                                                  22           



                                                  10:15:00           



                                                  CDT, 0           

 

     NS (Bolus)      -0      No                       500 mL,           [facility]

[facility]



     IV        4-11                               500 ml/hr,           l



               15:15:                               Infuse           [place]



               00                                 Over: 1           



                                                  hr, Route:           



                                                  IV, 500,           



                                                  Drug form:           



                                                  INJ, ONCE,           



                                                  Priority:           



                                                  STAT,           



                                                  Dosing           



                                                  Weight           



                                                  127.727           



                                                  kg, Start           



                                                  date:           



                                                  22           



                                                  10:15:00           



                                                  CDT, Stop           



                                                  date:           



                                                  22           



                                                  10:15:00           



                                                  CDT, 0           

 

     NS (Bolus)      -0      No                       500 mL,           [facility]

[facility]



     IV        4-11                               500 ml/hr,           l



               15:15:                               Infuse           [place]



               00                                 Over: 1           



                                                  hr, Route:           



                                                  IV, 500,           



                                                  Drug form:           



                                                  INJ, ONCE,           



                                                  Priority:           



                                                  STAT,           



                                                  Dosing           



                                                  Weight           



                                                  127.727           



                                                  kg, Start           



                                                  date:           



                                                  22           



                                                  10:15:00           



                                                  CDT, Stop           



                                                  date:           



                                                  22           



                                                  10:15:00           



                                                  CDT, 0           

 

     NS (Bolus)      -0      No                       500 mL,           [facility]

[facility]



     IV        4-11                               500 ml/hr,           l



               15:15:                               Infuse           [facility]



               00                                 Over: 1           



                                                  hr, Route:           



                                                  IV, 500,           



                                                  Drug form:           



                                                  INJ, ONCE,           



                                                  Priority:           



                                                  STAT,           



                                                  Dosing           



                                                  Weight           



                                                  127.727           



                                                  kg, Start           



                                                  date:           



                                                  22           



                                                  10:15:00           



                                                  CDT, Stop           



                                                  date:           



                                                  22           



                                                  10:15:00           



                                                  CDT, 0           

 

     NS (Bolus)      -0      No                       500 mL,           [facility]

[facility]



     IV        4-11                               500 ml/hr,           l



               15:15:                               Infuse           [facility]



               00                                 Over: 1           



                                                  hr, Route:           



                                                  IV, 500,           



                                                  Drug form:           



                                                  INJ, ONCE,           



                                                  Priority:           



                                                  STAT,           



                                                  Dosing           



                                                  Weight           



                                                  127.727           



                                                  kg, Start           



                                                  date:           



                                                  22           



                                                  10:15:00           



                                                  CDT, Stop           



                                                  date:           



                                                  22           



                                                  10:15:00           



                                                  CDT, 0           

 

     NS (Bolus)      -0      No                       500 mL,           [facility]

[facility]



     IV        4-11                               500 ml/hr,           l



               15:15:                               Infuse           [facility]



               00                                 Over: 1           



                                                  hr, Route:           



                                                  IV, 500,           



                                                  Drug form:           



                                                  INJ, ONCE,           



                                                  Priority:           



                                                  STAT,           



                                                  Dosing           



                                                  Weight           



                                                  127.727           



                                                  kg, Start           



                                                  date:           



                                                  22           



                                                  10:15:00           



                                                  CDT, Stop           



                                                  date:           



                                                  22           



                                                  10:15:00           



                                                  CDT, 0           

 

     NS (Bolus)      -0      No                       500 mL,           [facility]

[facility]



     IV        4-11                               500 ml/hr,           l



               15:15:                               Infuse           [place]



               00                                 Over: 1           



                                                  hr, Route:           



                                                  IV, 500,           



                                                  Drug form:           



                                                  INJ, ONCE,           



                                                  Priority:           



                                                  STAT,           



                                                  Dosing           



                                                  Weight           



                                                  127.727           



                                                  kg, Start           



                                                  date:           



                                                  22           



                                                  10:15:00           



                                                  CDT, Stop           



                                                  date:           



                                                  22           



                                                  10:15:00           



                                                  CDT, 0           

 

     NS (Bolus)      -0      No                       500 mL,           [facility]

[facility]



     IV        4-11                               500 ml/hr,           l



               15:15:                               Infuse           [place]



               00                                 Over: 1           



                                                  hr, Route:           



                                                  IV, 500,           



                                                  Drug form:           



                                                  INJ, ONCE,           



                                                  Priority:           



                                                  STAT,           



                                                  Dosing           



                                                  Weight           



                                                  127.727           



                                                  kg, Start           



                                                  date:           



                                                  22           



                                                  10:15:00           



                                                  CDT, Stop           



                                                  date:           



                                                  22           



                                                  10:15:00           



                                                  CDT, 0           

 

     Dextrose      -0      No                       12.5 gm,           [facility]

[facility]



     50% Syringe      11                               25 mL,           l



     (D50W)      13:59:                               Route:           [facility]



               00                                 IVP, Drug           



                                                  Form: INJ,           



                                                  Dosing           



                                                  Weight           



                                                  127.727,           



                                                  kg, PRN,           



                                                  PRN Blood           



                                                  Glucose           



                                                  Results,           



                                                  Start           



                                                  date:           



                                                  22           



                                                  8:59:00           



                                                  CDT,           



                                                  Duration:           



                                                  30 day,           



                                                  Stop date:           



                                                  22           



                                                  8:58:00           



                                                  CDT, 0           

 

     glucagon      -0      No                       1 mg,           [facility]



               4-11                               Route: IM,           l



               13:59:                               Drug form:           [place]



               00                                 PDR/INJ,           



                                                  PRN,           



                                                  Dosing           



                                                  Weight           



                                                  127.727,           



                                                  kg, PRN           



                                                  Blood           



                                                  Glucose           



                                                  Results,           



                                                  Start           



                                                  date:           



                                                  22           



                                                  8:59:00           



                                                  CDT,           



                                                  Duration:           



                                                  30 day,           



                                                  Stop date:           



                                                  22           



                                                  8:58:00           



                                                  CDT, 0           

 

     insulin      2022-0      No                       Notes:           [facility]



     lispro      4-11                               (Same as:           l



               13:59:                               Humalog)           [place]



               00                                 Roll in           



                                                  palms of           



                                                  hands           



                                                  gently; Do           



                                                  not shake           



                                                  vigorously           



                                                  . WASTE:           



                                                  F/P -           



                                                  Black; E -           



                                                  Municipal           



                                                  Trash Bin           



                                                  Stable for           



                                                  28 days at           



                                                  room           



                                                  temperatur           



                                                  e. Expires           



                                                  in _____           



                                                  days from           



                                                  __________           



                                                  ____Date           

 

     Dextrose      -0      No                       12.5 gm,           [facility]

[facility]



     50% Syringe      4-11                               25 mL,           l



     (D50W)      13:59:                               Route:           [facility]



               00                                 IVP, Drug           



                                                  Form: INJ,           



                                                  Dosing           



                                                  Weight           



                                                  127.727,           



                                                  kg, PRN,           



                                                  PRN Blood           



                                                  Glucose           



                                                  Results,           



                                                  Start           



                                                  date:           



                                                  22           



                                                  8:59:00           



                                                  CDT,           



                                                  Duration:           



                                                  30 day,           



                                                  Stop date:           



                                                  22           



                                                  8:58:00           



                                                  CDT, 0           

 

     glucagon      202-0      No                       1 mg,           [facility]



               4-11                               Route: IM,           l



               13:59:                               Drug form:           [place]



               00                                 PDR/INJ,           



                                                  PRN,           



                                                  Dosing           



                                                  Weight           



                                                  127.727,           



                                                  kg, PRN           



                                                  Blood           



                                                  Glucose           



                                                  Results,           



                                                  Start           



                                                  date:           



                                                  22           



                                                  8:59:00           



                                                  CDT,           



                                                  Duration:           



                                                  30 day,           



                                                  Stop date:           



                                                  22           



                                                  8:58:00           



                                                  CDT, 0           

 

     insulin      202-0      No                       Notes:           [facility]



     lispro      4-11                               (Same as:           l



               13:59:                               Humalog)           [facility]



               00                                 Roll in           



                                                  palms of           



                                                  hands           



                                                  gently; Do           



                                                  not shake           



                                                  vigorously           



                                                  . WASTE:           



                                                  F/P -           



                                                  Black; E -           



                                                  Municipal           



                                                  Trash Bin           



                                                  Stable for           



                                                  28 days at           



                                                  room           



                                                  temperatur           



                                                  e. Expires           



                                                  in _____           



                                                  days from           



                                                  __________           



                                                  ____Date           

 

     Dextrose      -0      No                       12.5 gm,           [facility]

[facility]



     50% Syringe      4-11                               25 mL,           l



     (D50W)      13:59:                               Route:           [place]



               00                                 IVP, Drug           



                                                  Form: INJ,           



                                                  Dosing           



                                                  Weight           



                                                  127.727,           



                                                  kg, PRN,           



                                                  PRN Blood           



                                                  Glucose           



                                                  Results,           



                                                  Start           



                                                  date:           



                                                  22           



                                                  8:59:00           



                                                  CDT,           



                                                  Duration:           



                                                  30 day,           



                                                  Stop date:           



                                                  22           



                                                  8:58:00           



                                                  CDT, 0           

 

     glucagon      202-0      No                       1 mg,           [facility]



               4-11                               Route: IM,           l



               13:59:                               Drug form:           [place]



               00                                 PDR/INJ,           



                                                  PRN,           



                                                  Dosing           



                                                  Weight           



                                                  127.727,           



                                                  kg, PRN           



                                                  Blood           



                                                  Glucose           



                                                  Results,           



                                                  Start           



                                                  date:           



                                                  22           



                                                  8:59:00           



                                                  CDT,           



                                                  Duration:           



                                                  30 day,           



                                                  Stop date:           



                                                  22           



                                                  8:58:00           



                                                  CDT, 0           

 

     insulin      202-0      No                       Notes:           [facility]



     lispro      4-11                               (Same as:           l



               13:59:                               Humalog)                                            Roll in           



                                                  palms of           



                                                  hands           



                                                  gently; Do           



                                                  not shake           



                                                  vigorously           



                                                  . WASTE:           



                                                  F/P -           



                                                  Black; E -           



                                                  Municipal           



                                                  Trash Bin           



                                                  Stable for           



                                                  28 days at           



                                                  room           



                                                  temperatur           



                                                  e. Expires           



                                                  in _____           



                                                  days from           



                                                  __________           



                                                  ____Date           

 

     Dextrose      -0      No                       12.5 gm,           [facility]

[facility]



     50% Syringe      -11                               25 mL,           l



     (D50W)      13:59:                               Route:           [place]                                 IVP, Drug           



                                                  Form: INJ,           



                                                  Dosing           



                                                  Weight           



                                                  127.727,           



                                                  kg, PRN,           



                                                  PRN Blood           



                                                  Glucose           



                                                  Results,           



                                                  Start           



                                                  date:           



                                                  22           



                                                  8:59:00           



                                                  CDT,           



                                                  Duration:           



                                                  30 day,           



                                                  Stop date:           



                                                  22           



                                                  8:58:00           



                                                  CDT, 0           

 

     glucagon      -0      No                       1 mg,           [facility]



               4-11                               Route: IM,           l



               13:59:                               Drug form:           [facility]



               00                                 PDR/INJ,           



                                                  PRN,           



                                                  Dosing           



                                                  Weight           



                                                  127.727,           



                                                  kg, PRN           



                                                  Blood           



                                                  Glucose           



                                                  Results,           



                                                  Start           



                                                  date:           



                                                  22           



                                                  8:59:00           



                                                  CDT,           



                                                  Duration:           



                                                  30 day,           



                                                  Stop date:           



                                                  22           



                                                  8:58:00           



                                                  CDT, 0           

 

     insulin      -0      No                       Notes:           [facility]



     lispro      4-11                               (Same as:           l



               13:59:                               Humalog)           [place]                                 Roll in           



                                                  palms of           



                                                  hands           



                                                  gently; Do           



                                                  not shake           



                                                  vigorously           



                                                  . WASTE:           



                                                  F/P -           



                                                  Black; E -           



                                                  Municipal           



                                                  Trash Bin           



                                                  Stable for           



                                                  28 days at           



                                                  room           



                                                  temperatur           



                                                  e. Expires           



                                                  in _____           



                                                  days from           



                                                  __________           



                                                  ____Date           

 

     Dextrose      2-0      No                       12.5 gm,           [facility]

[facility]



     50% Syringe      4-11                               25 mL,           l



     (D50W)      13:59:                               Route:           [place]



               00                                 IVP, Drug           



                                                  Form: INJ,           



                                                  Dosing           



                                                  Weight           



                                                  127.727,           



                                                  kg, PRN,           



                                                  PRN Blood           



                                                  Glucose           



                                                  Results,           



                                                  Start           



                                                  date:           



                                                  22           



                                                  8:59:00           



                                                  CDT,           



                                                  Duration:           



                                                  30 day,           



                                                  Stop date:           



                                                  22           



                                                  8:58:00           



                                                  CDT, 0           

 

     glucagon      -0      No                       1 mg,           [facility]



               4-11                               Route: IM,           l



               13:59:                               Drug form:           [place]                                 PDR/INJ,           



                                                  PRN,           



                                                  Dosing           



                                                  Weight           



                                                  127.727,           



                                                  kg, PRN           



                                                  Blood           



                                                  Glucose           



                                                  Results,           



                                                  Start           



                                                  date:           



                                                  22           



                                                  8:59:00           



                                                  CDT,           



                                                  Duration:           



                                                  30 day,           



                                                  Stop date:           



                                                  22           



                                                  8:58:00           



                                                  CDT, 0           

 

     insulin      -0      No                       Notes:           [facility]



     lispro      -11                               (Same as:           l



               13:59:                               Humalog)                                            Roll in           



                                                  palms of           



                                                  hands           



                                                  gently; Do           



                                                  not shake           



                                                  vigorously           



                                                  . WASTE:           



                                                  F/P -           



                                                  Black; E -           



                                                  Municipal           



                                                  Trash Bin           



                                                  Stable for           



                                                  28 days at           



                                                  room           



                                                  temperatur           



                                                  e. Expires           



                                                  in _____           



                                                  days from           



                                                  __________           



                                                  ____Date           

 

     Dextrose      -0      No                       12.5 gm,           [facility]

[facility]



     50% Syringe      4-11                               25 mL,           l



     (D50W)      13:59:                               Route:                                            IVP, Drug           



                                                  Form: INJ,           



                                                  Dosing           



                                                  Weight           



                                                  127.727,           



                                                  kg, PRN,           



                                                  PRN Blood           



                                                  Glucose           



                                                  Results,           



                                                  Start           



                                                  date:           



                                                  22           



                                                  8:59:00           



                                                  CDT,           



                                                  Duration:           



                                                  30 day,           



                                                  Stop date:           



                                                  22           



                                                  8:58:00           



                                                  CDT, 0           

 

     glucagon      -0      No                       1 mg,           [facility]



               4-11                               Route: IM,           l



               13:59:                               Drug form:           [place]



               00                                 PDR/INJ,           



                                                  PRN,           



                                                  Dosing           



                                                  Weight           



                                                  127.727,           



                                                  kg, PRN           



                                                  Blood           



                                                  Glucose           



                                                  Results,           



                                                  Start           



                                                  date:           



                                                  22           



                                                  8:59:00           



                                                  CDT,           



                                                  Duration:           



                                                  30 day,           



                                                  Stop date:           



                                                  22           



                                                  8:58:00           



                                                  CDT, 0           

 

     insulin      2022-0      No                       Notes:           [facility]



     lispro      4-11                               (Same as:           l



               13:59:                               Humalog)           [place]                                 Roll in           



                                                  palms of           



                                                  hands           



                                                  gently; Do           



                                                  not shake           



                                                  vigorously           



                                                  . WASTE:           



                                                  F/P -           



                                                  Black; E -           



                                                  Municipal           



                                                  Trash Bin           



                                                  Stable for           



                                                  28 days at           



                                                  room           



                                                  temperatur           



                                                  e. Expires           



                                                  in _____           



                                                  days from           



                                                  __________           



                                                  ____Date           

 

     Dextrose      -0      No                       12.5 gm,           [facility]

[facility]



     50% Syringe      4-11                               25 mL,           l



     (D50W)      13:59:                               Route:           [place]



               00                                 IVP, Drug           



                                                  Form: INJ,           



                                                  Dosing           



                                                  Weight           



                                                  127.727,           



                                                  kg, PRN,           



                                                  PRN Blood           



                                                  Glucose           



                                                  Results,           



                                                  Start           



                                                  date:           



                                                  22           



                                                  8:59:00           



                                                  CDT,           



                                                  Duration:           



                                                  30 day,           



                                                  Stop date:           



                                                  22           



                                                  8:58:00           



                                                  CDT, 0           

 

     glucagon      2-0      No                       1 mg,           [facility]



               4-11                               Route: IM,           l



               13:59:                               Drug form:           [place]



               00                                 PDR/INJ,           



                                                  PRN,           



                                                  Dosing           



                                                  Weight           



                                                  127.727,           



                                                  kg, PRN           



                                                  Blood           



                                                  Glucose           



                                                  Results,           



                                                  Start           



                                                  date:           



                                                  22           



                                                  8:59:00           



                                                  CDT,           



                                                  Duration:           



                                                  30 day,           



                                                  Stop date:           



                                                  22           



                                                  8:58:00           



                                                  CDT, 0           

 

     insulin      -0      No                       Notes:           [facility]



     lispro      4-11                               (Same as:           l



               13:59:                               Humalog)           [place]                                 Roll in           



                                                  palms of           



                                                  hands           



                                                  gently; Do           



                                                  not shake           



                                                  vigorously           



                                                  . WASTE:           



                                                  F/P -           



                                                  Black; E -           



                                                  Municipal           



                                                  Trash Bin           



                                                  Stable for           



                                                  28 days at           



                                                  room           



                                                  temperatur           



                                                  e. Expires           



                                                  in _____           



                                                  days from           



                                                  __________           



                                                  ____Date           

 

     Dextrose      -0      No                       12.5 gm,           [facility]

[facility]



     50% Syringe      4-11                               25 mL,           l



     (D50W)      13:59:                               Route:           [facility]



               00                                 IVP, Drug           



                                                  Form: INJ,           



                                                  Dosing           



                                                  Weight           



                                                  127.727,           



                                                  kg, PRN,           



                                                  PRN Blood           



                                                  Glucose           



                                                  Results,           



                                                  Start           



                                                  date:           



                                                  22           



                                                  8:59:00           



                                                  CDT,           



                                                  Duration:           



                                                  30 day,           



                                                  Stop date:           



                                                  22           



                                                  8:58:00           



                                                  CDT, 0           

 

     glucagon      2022-0      No                       1 mg,           [facility]



               4-11                               Route: IM,           l



               13:59:                               Drug form:           [facility]



                                                PDR/INJ,           



                                                  PRN,           



                                                  Dosing           



                                                  Weight           



                                                  127.727,           



                                                  kg, PRN           



                                                  Blood           



                                                  Glucose           



                                                  Results,           



                                                  Start           



                                                  date:           



                                                  22           



                                                  8:59:00           



                                                  CDT,           



                                                  Duration:           



                                                  30 day,           



                                                  Stop date:           



                                                  22           



                                                  8:58:00           



                                                  CDT, 0           

 

     insulin            No                       Notes:           [facility]



     lispro      4-11                               (Same as:           l



               13:59:                               Humalog)                                            Roll in           



                                                  palms of           



                                                  hands           



                                                  gently; Do           



                                                  not shake           



                                                  vigorously           



                                                  .  WASTE:           



                                                  F/P -           



                                                  Black; E -           



                                                  Municipal           



                                                  Trash Bin           



                                                  Stable for           



                                                  28 days at           



                                                  room           



                                                  temperatur           



                                                  e. Expires           



                                                  in _____           



                                                  days from           



                                                  __________           



                                                  ____Date           

 

     Lyrica            No                       Notes:           [facility]



               4-11                               (Same as:           l



               02:00:                               [facility])                                                           

 

     Lyrica            No                       Notes:           [facility]



               4-11                               (Same as:           l



               02:00:                               [facility])                                                           

 

     Lyrica            No                       Notes:           [facility]



               4-11                               (Same as:           l



               02:00:                               [facility])                                                           

 

     [facility]rica            No                       Notes:           [facility]



               4-11                               (Same as:           l



               02:00:                               [facility])                                                           

 

     Lyrica            No                       Notes:           [facility]



               4-11                               (Same as:           l



               02:00:                               [facility])                                                           

 

     Lyrica            No                       Notes:           [facility]



               4-11                               (Same as:           l



               02:00:                               [facility])                                                           

 

     Lyrica            No                       Notes:           [facility]



               4-11                               (Same as:           l



               02:00:                               [facility])                                                           

 

     Lyrica            No                       Notes:           [facility]



               4-11                               (Same as:           l



               02:00:                               [facility])                                                           

 

     valproic            No                       Notes:           [facility]



     acid 100      4-10                               Dilute in           l



     mg/mL      17:00:                               at least           [facility]



     intravenous                                       50ml D5W           



     solution +                                         or NS.           



     Sodium                                         Infusion           



     Chloride                                         rate = 20           



     0.9% IV 50                                         mg/min           



     mL                                           (Same As:           



                                                  [facility])           



                                                  Hazardous           



                                                  Drug Group           



                                                  3:Reproduc           



                                                  tive risk           



                                                  Hazardous           



                                                  Drug --           



                                                  Refer to           



                                                  safe           



                                                  handling           



                                                  procedure           



                                                  [facility]           

 

     valproic            No                       Notes:           [facility]



     acid 100      4-10                               Dilute in           l



     mg/mL      17:00:                               at least           [place]



     intravenous      00                                 50ml D5W           



     solution +                                         or NS.           



     Sodium                                         Infusion           



     Chloride                                         rate = 20           



     0.9% IV 50                                         mg/min           



     mL                                           (Same As:           



                                                  [facility])           



                                                  Hazardous           



                                                  Drug Group           



                                                  3:Reproduc           



                                                  tive risk           



                                                  Hazardous           



                                                  Drug --           



                                                  Refer to           



                                                  safe           



                                                  handling           



                                                  procedure           



                                                  [facility]           

 

     valproic            No                       Notes:           [facility]



     acid 100      4-10                               Dilute in           l



     mg/mL      17:00:                               at least           [facility]



     intravenous      00                                 50ml D5W           



     solution +                                         or NS.           



     Sodium                                         Infusion           



     Chloride                                         rate = 20           



     0.9% IV 50                                         mg/min           



     mL                                           (Same As:           



                                                  [facility])           



                                                  Hazardous           



                                                  Drug Group           



                                                  3:Reproduc           



                                                  tive risk           



                                                  Hazardous           



                                                  Drug --           



                                                  Refer to           



                                                  safe           



                                                  handling           



                                                  procedure           



                                                  [facility]           

 

     valproic            No                       Notes:           [facility]



     acid 100      4-10                               Dilute in           l



     mg/mL      17:00:                               at least           [facility]



     intravenous      00                                 50ml D5W           



     solution +                                         or NS.           



     Sodium                                         Infusion           



     Chloride                                         rate = 20           



     0.9% IV 50                                         mg/min           



     mL                                           (Same As:           



                                                  [facility])           



                                                  Hazardous           



                                                  Drug Group           



                                                  3:Reproduc           



                                                  tive risk           



                                                  Hazardous           



                                                  Drug --           



                                                  Refer to           



                                                  safe           



                                                  handling           



                                                  procedure           



                                                  [facility]           

 

     valproic            No                       Notes:           [facility]



     acid 100      4-10                               Dilute in           l



     mg/mL      17:00:                               at least           [facility]



     intravenous      00                                 50ml D5W           



     solution +                                         or NS.           



     Sodium                                         Infusion           



     Chloride                                         rate = 20           



     0.9% IV 50                                         mg/min           



     mL                                           (Same As:           



                                                  [facility])           



                                                  Hazardous           



                                                  Drug Group           



                                                  3:Reproduc           



                                                  tive risk           



                                                  Hazardous           



                                                  Drug --           



                                                  Refer to           



                                                  safe           



                                                  handling           



                                                  procedure           



                                                  [facility]           

 

     valproic            No                       Notes:           [facility]



     acid 100      4-10                               Dilute in           l



     mg/mL      17:00:                               at least           [facility]



     intravenous      00                                 50ml D5W           



     solution +                                         or NS.           



     Sodium                                         Infusion           



     Chloride                                         rate = 20           



     0.9% IV 50                                         mg/min           



     mL                                           (Same As:           



                                                  [facility])           



                                                  Hazardous           



                                                  Drug Group           



                                                  3:Reproduc           



                                                  tive risk           



                                                  Hazardous           



                                                  Drug --           



                                                  Refer to           



                                                  safe           



                                                  handling           



                                                  procedure           



                                                  [facility]           

 

     valproic            No                       Notes:           [facility]



     acid 100      4-10                               Dilute in           l



     mg/mL      17:00:                               at least           [facility]



     intravenous      00                                 50ml D5W           



     solution +                                         or NS.           



     Sodium                                         Infusion           



     Chloride                                         rate = 20           



     0.9% IV 50                                         mg/min           



     mL                                           (Same As:           



                                                  [facility])           



                                                  Hazardous           



                                                  Drug Group           



                                                  3:Reproduc           



                                                  tive risk           



                                                  Hazardous           



                                                  Drug --           



                                                  Refer to           



                                                  safe           



                                                  handling           



                                                  procedure           



                                                  [facility]           

 

     valproic            No                       Notes:           [facility]



     acid 100      4-10                               Dilute in           l



     mg/mL      17:00:                               at least           [facility]



     intravenous      00                                 50ml D5W           



     solution +                                         or NS.           



     Sodium                                         Infusion           



     Chloride                                         rate = 20           



     0.9% IV 50                                         mg/min           



     mL                                           (Same As:           



                                                  [facility])           



                                                  Hazardous           



                                                  Drug Group           



                                                  3:Reproduc           



                                                  tive risk           



                                                  Hazardous           



                                                  Drug --           



                                                  Refer to           



                                                  safe           



                                                  handling           



                                                  procedure           



                                                  [facility]           

 

     Solu-MEDROL            No                       Notes:           [facility]

rajeev



               4-10                               (Same           l



               16:43:                               as:Solu-ME           [place]



               00                                 DROL,           



                                                  A-[facility]eth[facility]re           



                                                  d) ***           



                                                  MEDICATION           



                                                  WASTE ***           



                                                  Product           



                                                  Size: 1000           



                                                  mg Product           



                                                  Wasted:           



                                                  _0__ mg           

 

     magnesium            No                       Notes:           [facility]

a



     sulfate      4-10                               WASTE: F/P           l



               16:43:                               - Sink; E           [place]



                                                -              



                                                  [facility]unicipal           



                                                  Trash Bin           

 

     Solu-MEDROL            No                       Notes:           [facility]

rajeev



               4-10                               (Same           l



               16:43:                               as:Solu-ME           [facility]



               00                                 DROL,           



                                                  A-Methapre           



                                                  d) ***           



                                                  MEDICATION           



                                                  WASTE ***           



                                                  Product           



                                                  Size: 1000           



                                                  mg Product           



                                                  Wasted:           



                                                  _0__ mg           

 

     magnesium            No                       Notes:           [facility]

a



     sulfate      4-10                               WASTE: F/P           l



               16:43:                               - Sink; E           [place]



                                                -              



                                                  Municipal           



                                                  Trash Bin           

 

     Solu-MEDROL            No                       Notes:           [facility]

rajeev



               4-10                               (Same           l



               16:43:                               as:Solu-ME           [facility]



               00                                 DROL,           



                                                  A-[place]ethapre           



                                                  d) ***           



                                                  MEDICATION           



                                                  WASTE ***           



                                                  Product           



                                                  Size: 1000           



                                                  mg Product           



                                                  Wasted:           



                                                  _0__ mg           

 

     magnesium            No                       Notes:           [facility]

a



     sulfate      4-10                               WASTE: F/P           l



               16:43:                               - Sink; E           [facility]



                                                -              



                                                  Municipal           



                                                  Trash Bin           

 

     Solu-MEDROL            No                       Notes:           [facility]

rajeev



               4-10                               (Same           l



               16:43:                               as:Solu-ME           [place]



               00                                 DROL,           



                                                  A-[place]ethapre           



                                                  d) ***           



                                                  MEDICATION           



                                                  WASTE ***           



                                                  Product           



                                                  Size: 1000           



                                                  mg Product           



                                                  Wasted:           



                                                  _0__ mg           

 

     magnesium            No                       Notes:           [facility]

a



     sulfate      4-10                               WASTE: F/P           l



               16:43:                               - Sink; E           [place]



                                                -              



                                                  Municipal           



                                                  Trash Bin           

 

     Solu-MEDROL            No                       Notes:           [facility]

rajeev



               4-10                               (Same           l



               16:43:                               as:Solu-ME           [place]



               00                                 DROL,           



                                                  A-[place]ethapre           



                                                  d) ***           



                                                  MEDICATION           



                                                  WASTE ***           



                                                  Product           



                                                  Size: 1000           



                                                  mg Product           



                                                  Wasted:           



                                                  _0__ mg           

 

     magnesium            No                       Notes:           [facility]

a



     sulfate      4-10                               WASTE: F/P           l



               16:43:                               - Sink;                                  -              



                                                  Municipal           



                                                  Trash Bin           

 

     Solu-MEDROL            No                       Notes:           [facility]

rajeev



               4-10                               (Same           l



               16:43:                               as:Solu-ME           [place]



               00                                 DROL,           



                                                  A-[place]ethapre           



                                                  d) ***           



                                                  MEDICATION           



                                                  WASTE ***           



                                                  Product           



                                                  Size: 1000           



                                                  mg Product           



                                                  Wasted:           



                                                  _0__ mg           

 

     magnesium            No                       Notes:           [facility]

a



     sulfate      4-10                               WASTE: F/P           l



               16:43:                               - Sink;                                  -              



                                                  Municipal           



                                                  Trash Bin           

 

     Solu-MEDROL            No                       Notes:           [facility]

rajeev



               4-10                               (Same           l



               16:43:                               as:Solu-ME           [place]



               00                                 DROL,           



                                                  A-[place]ethapre           



                                                  d)  ***           



                                                  MEDICATION           



                                                  WASTE ***           



                                                  Product           



                                                  Size: 1000           



                                                  mg Product           



                                                  Wasted:           



                                                  _0__ mg           

 

     magnesium            No                       Notes:           [facility]

a



     sulfate      4-10                               WASTE: F/P           l



               16:43:                               - Sink;                                  -              



                                                  Municipal           



                                                  Trash Bin           

 

     Solu-MEDROL            No                       Notes:           [facility]

rajeev



               4-10                               (Same           l



               16:43:                               as:Solu-ME           [facility]



               00                                 DROL,           



                                                  A-[place]ethapre           



                                                  d) ***           



                                                  MEDICATION           



                                                  WASTE ***           



                                                  Product           



                                                  Size: 1000           



                                                  mg Product           



                                                  Wasted:           



                                                  _0__ mg           

 

     magnesium            No                       Notes:           [facility]

a



     sulfate      4-10                               WASTE: F/P           l



               16:43:                               - Sink;                                  -              



                                                  Municipal           



                                                  Trash Bin           

 

     amitriptyli            No                       Notes:           [facility]

[facility]



     ne        4-10                               (Same as:           l



               02:00:                               Elavil)                                                           

 

     amitriptyli            No                       Notes:           [facility]

[facility]



     ne        4-10                               (Same as:           l



               02:00:                               Elavil)                                                           

 

     amitriptyli            No                       Notes:           [facility]

[facility]



     ne        4-10                               (Same as:           l



               02:00:                               Elavil)                                                           

 

     amitriptyli            No                       Notes:           [facility]

[facility]



     ne        4-10                               (Same as:           l



               02:00:                               Elavil)                                                           

 

     amitriptyli            No                       Notes:           [facility]

[facility]



     ne        4-10                               (Same as:           l



               02:00:                               Elavil)                                                           

 

     amitriptyli            No                       Notes:           [facility]

[facility]



     ne        4-10                               (Same as:           l



               02:00:                               Elavil)                                                           

 

     amitriptyli            No                       Notes:           [facility]

[facility]



     ne        4-10                               (Same as:           l



               02:00:                               Elavil)                                                           

 

     amitriptyli            No                       Notes:           [facility]

[facility]



     ne        4-10                               (Same as:           l



               02:00:                               Elavil)                                                           

 

     SUMAtriptan            No                       Notes:           [facility]

[facility]



               4-09                               (Same As:           l



               18:45:                               Imitrex)                                                           

 

     SUMAtriptan            No                       Notes:           [facility]

ra[facility]



               4-09                               (Same As:           l



               18:45:                               Imitrex)                                                           

 

     SUMAtriptan            No                       Notes:           [facility]

[facility]



               4-09                               (Same As:           l



               18:45:                               Imitrex)                                                           

 

     SUMAtriptan            No                       Notes:           [facility]

[facility]



               4-09                               (Same As:           l



               18:45:                               Imitrex)                                                           

 

     SUMAtriptan            No                       Notes:           [facility]

[facility]



               4-09                               (Same As:           l



               18:45:                               Imitrex)                                                           

 

     SUMAtriptan            No                       Notes:           [facility]

[facility]



               4-09                               (Same As:           l



               18:45:                               Imitrex)                                                           

 

     SUMAtriptan            No                       Notes:           [facility]

ra[facility]ev



               4-09                               (Same As:           l



               18:45:                               Imitrex)                                                           

 

     SUMAtriptan            No                       Notes:           [facility]

[facility]



               4-09                               (Same As:           l



               18:45:                               Imitrex)                                                           

 

     promethazin            No                       6.25 mg,           [facility]

[facility]



     e         4-09                               Route:           l



               18:44:                               IVPB,           [place]



               00                                 Q12H,           



                                                  Dosing           



                                                  Weight           



                                                  127.727,           



                                                  kg, PRN           



                                                  Nausea &           



                                                  Vomiting,           



                                                  Start           



                                                  date:           



                                                  22           



                                                  13:44:00           



                                                  CDT,           



                                                  Duration:           



                                                  30 day,           



                                                  Stop date:           



                                                  22           



                                                  13:43:00           



                                                  CDT            

 

     promethazin      2022-0      No                       6.25 mg,           [facility]

[facility]



     e         -                               Route:           l



               18:44:                               IVPB,           [facility]



               00                                 Q12H,           



                                                  Dosing           



                                                  Weight           



                                                  127.727,           



                                                  kg, PRN           



                                                  Nausea &           



                                                  Vomiting,           



                                                  Start           



                                                  date:           



                                                  22           



                                                  13:44:00           



                                                  CDT,           



                                                  Duration:           



                                                  30 day,           



                                                  Stop date:           



                                                  22           



                                                  13:43:00           



                                                  CDT            

 

     promethazin      2022-0      No                       6.25 mg,           [facility]

[facility]



     e         -                               Route:           l



               18:44:                               IVPB,           [place]



               00                                 Q12H,           



                                                  Dosing           



                                                  Weight           



                                                  127.727,           



                                                  kg, PRN           



                                                  Nausea &           



                                                  Vomiting,           



                                                  Start           



                                                  date:           



                                                  22           



                                                  13:44:00           



                                                  CDT,           



                                                  Duration:           



                                                  30 day,           



                                                  Stop date:           



                                                  22           



                                                  13:43:00           



                                                  CDT            

 

     promethazin      2022-0      No                       6.25 mg,           [facility]

[facility]



     e         -                               Route:           l



               18:44:                               IVPB,           [place]



               00                                 Q12H,           



                                                  Dosing           



                                                  Weight           



                                                  127.727,           



                                                  kg, PRN           



                                                  Nausea &           



                                                  Vomiting,           



                                                  Start           



                                                  date:           



                                                  22           



                                                  13:44:00           



                                                  CDT,           



                                                  Duration:           



                                                  30 day,           



                                                  Stop date:           



                                                  22           



                                                  13:43:00           



                                                  CDT            

 

     promethazin      2022-0      No                       6.25 mg,           Me

[facility]



     e         -                               Route:           l



               18:44:                               IVPB,           [place]



               00                                 Q12H,           



                                                  Dosing           



                                                  Weight           



                                                  127.727,           



                                                  kg, PRN           



                                                  Nausea &           



                                                  Vomiting,           



                                                  Start           



                                                  date:           



                                                  22           



                                                  13:44:00           



                                                  CDT,           



                                                  Duration:           



                                                  30 day,           



                                                  Stop date:           



                                                  22           



                                                  13:43:00           



                                                  CDT            

 

     promethazin      2022-0      No                       6.25 mg,           Me

[facility]



     e         -                               Route:           l



               18:44:                               IVPB,           [place]



               00                                 Q12H,           



                                                  Dosing           



                                                  Weight           



                                                  127.727,           



                                                  kg, PRN           



                                                  Nausea &           



                                                  Vomiting,           



                                                  Start           



                                                  date:           



                                                  22           



                                                  13:44:00           



                                                  CDT,           



                                                  Duration:           



                                                  30 day,           



                                                  Stop date:           



                                                  22           



                                                  13:43:00           



                                                  CDT            

 

     promethazin      2022-0      No                       6.25 mg,           Me

[facility]



     e         4-09                               Route:           l



               18:44:                               IVPB,           [place]



               00                                 Q12H,           



                                                  Dosing           



                                                  Weight           



                                                  127.727,           



                                                  kg, PRN           



                                                  Nausea &           



                                                  Vomiting,           



                                                  Start           



                                                  date:           



                                                  22           



                                                  13:44:00           



                                                  CDT,           



                                                  Duration:           



                                                  30 day,           



                                                  Stop date:           



                                                  22           



                                                  13:43:00           



                                                  CDT            

 

     promethazin            No                       6.25 mg,           Me

[facility]



     e         4-09                               Route:           l



               18:44:                               IVPB,           [place]



               00                                 Q12H,           



                                                  Dosing           



                                                  Weight           



                                                  127.727,           



                                                  kg, PRN           



                                                  Nausea &           



                                                  Vomiting,           



                                                  Start           



                                                  date:           



                                                  22           



                                                  13:44:00           



                                                  CDT,           



                                                  Duration:           



                                                  30 day,           



                                                  Stop date:           



                                                  22           



                                                  13:43:00           



                                                  CDT            

 

     Benadryl            No                       Notes:           [facility]



               4-09                               (Same as:           l



               18:37:                               Benadryl)                                                           

 

     Benadryl            No                       Notes:           [facility]



               4-09                               (Same as:           l



               18:37:                               Benadryl)                                                           

 

     Benadryl            No                       Notes:           [facility]



               4-09                               (Same as:           l



               18:37:                               Benadryl)           [place]                                                

 

     Benadryl            No                       Notes:           [facility]



               4-09                               (Same as:           l



               18:37:                               Benadryl)                                                           

 

     Benadryl            No                       Notes:           [facility]



               4-09                               (Same as:           l



               18:37:                               Benadryl)                                                           

 

     Benadryl            No                       Notes:           [facility]



               4-09                               (Same as:           l



               18:37:                               Benadryl)                                                           

 

     Benadryl            No                       Notes:           [facility]



               4-09                               (Same as:           l



               18:37:                               Benadryl)                                                           

 

     Benadryl            No                       Notes:           [facility]



               4-09                               (Same as:           l



               18:37:                               Benadryl)                                                           

 

     Dilaudid            No                       Notes:           [facility]



               4-09                               (Same as:           l



               18:36:                               Dilaudid)                                                           

 

     Dilaudid            No                       Notes:           [facility]



               4-09                               (Same as:           l



               18:36:                               Dilaudid)                                                           

 

     Dilaudid            No                       Notes:           [facility]



               4-09                               (Same as:           l



               18:36:                               Dilaudid)           [place]



                                                               

 

     Dilaudid      0      No                       Notes:           [facility]



               4-09                               (Same as:           l



               18:36:                               Dilaudid)           [place]



                                                               

 

     Dilaudid      0      No                       Notes:           [facility]



               4-09                               (Same as:           l



               18:36:                               Dilaudid)           [place]



                                                               

 

     Dilaudid            No                       Notes:           [facility]



               4-09                               (Same as:           l



               18:36:                               Dilaudid)           [place]



                                                               

 

     Dilaudid      0      No                       Notes:           [facility]



               4-09                               (Same as:           l



               18:36:                               Dilaudid)           [place]



                                                               

 

     Dilaudid      0      No                       Notes:           [facility]



               4-09                               (Same as:           l



               18:36:                               Dilaudid)           [place]



                                                               

 

     Phenergan      0      No                       Notes:           [facility]

a



               4-09                               (Same as:           l



               18:33:                               Phenergan)           [place]



               00                                 6.25 mg =           



                                                  1/2 x 12.5           



                                                  mg TAB           

 

     Phenergan      -0      No                       Notes:           [facility]

a



               4-09                               (Same as:           l



               18:33:                               Phenergan)           [place]



                                                6.25 mg =           



                                                  1/2 x 12.5           



                                                  mg TAB           

 

     Phenergan      -0      No                       Notes:           [facility]

a



               4-09                               (Same as:           l



               18:33:                               Phenergan)           [place]



               00                                 6.25 mg =           



                                                  1/2 x 12.5           



                                                  mg TAB           

 

     Phenergan      -0      No                       Notes:           [facility]

a



               4-09                               (Same as:           l



               18:33:                               Phenergan)           [place]



               00                                 6.25 mg =           



                                                  1/2 x 12.5           



                                                  mg TAB           

 

     Phenergan      -0      No                       Notes:           [facility]

a



               4-09                               (Same as:           l



               18:33:                               Phenergan)           [place]



               00                                 6.25 mg =           



                                                  1/2 x 12.5           



                                                  mg TAB           

 

     Phenergan      -0      No                       Notes:           [facility]

a



               4-09                               (Same as:           l



               18:33:                               Phenergan)           [place]



               00                                 6.25 mg =           



                                                  1/2 x 12.5           



                                                  mg TAB           

 

     Phenergan      -0      No                       Notes:           [facility]

a



               4-09                               (Same as:           l



               18:33:                               Phenergan)           [place]



               00                                 6.25 mg =           



                                                  1/2 x 12.5           



                                                  mg TAB           

 

     Phenergan      -0      No                       Notes:           [facility]

a



               4-09                               (Same as:           l



               18:33:                               Phenergan)           [place]



                                                6.25 mg =           



                                                  1/2 x 12.5           



                                                  mg TAB           

 

     ascorbic            No                       Notes:           [facility]



     acid      4-09                               (Same as:           l



               14:00:                               Vitamin C)           [place]



                                                               

 

     celecoxib            No                       Notes:           [facility]

a



               4-09                               NSAID.           l



               14:00:                               Please           [place]



               00                                 check           



                                                  indication           



                                                  . Not for           



                                                  seizure.           



                                                  (Same As:           



                                                  CeleBREX)           

 

     Lidoderm 5%            No                       Notes:           [facility]

[facility]



     topical      -                               Apply only           l



     film      14:00:                               once for           [place]



     (patch)      00                                 up to 12           



                                                  hours in a           



                                                  24-hour           



                                                  period (12           



                                                  hours on           



                                                  and 12           



                                                  hours           



                                                  off).           



                                                  (Same as:           



                                                  Aspercreme           



                                                  Lidocaine           



                                                  Patch)           



                                                  "Remove           



                                                  old patch           



                                                  before           



                                                  applicatio           



                                                  n of new           



                                                  patch"           

 

     zinc            No                       Notes:           [facility]



     sulfate      -                               (Zinc           l



               14:00:                               sulfate           [place]



               00                                 capsule) -           



                                                  220 mg           



                                                  Zinc           



                                                  sulfate =           



                                                  50 mg           



                                                  elemental           



                                                  zinc Same           



                                                  as Zinc           



                                                  Sulfate           

 

     ascorbic            No                       Notes:           [facility]



     acid      4-09                               (Same as:           l



               14:00:                               Vitamin C)           [place]                                                

 

     celecoxib            No                       Notes:           [facility]

a



               4-09                               NSAID.           l



               14:00:                               Please           [place]



               00                                 check           



                                                  indication           



                                                  . Not for           



                                                  seizure.           



                                                  (Same As:           



                                                  CeleBREX)           

 

     Lidoderm 5%            No                       Notes:           [facility]

[facility]



     topical                                     Apply only           l



     film      14:00:                               once for           [place]



     (patch)      00                                 up to 12           



                                                  hours in a           



                                                  24-hour           



                                                  period (12           



                                                  hours on           



                                                  and 12           



                                                  hours           



                                                  off).           



                                                  (Same as:           



                                                  Aspercreme           



                                                  Lidocaine           



                                                  Patch)           



                                                  "Remove           



                                                  old patch           



                                                  before           



                                                  applicatio           



                                                  n of new           



                                                  patch"           

 

     zinc            No                       Notes:           [facility]



     sulfate      4-                               (Zinc           l



               14:00:                               sulfate           [place]



               00                                 capsule) -           



                                                  220 mg           



                                                  Zinc           



                                                  sulfate =           



                                                  50 mg           



                                                  elemental           



                                                  zinc Same           



                                                  as Zinc           



                                                  Sulfate           

 

     ascorbic            No                       Notes:           [facility]



     acid      4-09                               (Same as:           l



               14:00:                               Vitamin C)           [place]



                                                               

 

     celecoxib            No                       Notes:           [facility]

a



               4-09                               NSAID.           l



               14:00:                               Please           [place]



               00                                 check           



                                                  indication           



                                                  . Not for           



                                                  seizure.           



                                                  (Same As:           



                                                  CeleBREX)           

 

     Lidoderm 5%            No                       Notes:           [facility]

[facility]



     topical      4-09                               Apply only           l



     film      14:00:                               once for           [place]



     (patch)      00                                 up to 12           



                                                  hours in a           



                                                  24-hour           



                                                  period (12           



                                                  hours on           



                                                  and 12           



                                                  hours           



                                                  off).           



                                                  (Same as:           



                                                  Aspercreme           



                                                  Lidocaine           



                                                  Patch)           



                                                  "Remove           



                                                  old patch           



                                                  before           



                                                  applicatio           



                                                  n of new           



                                                  patch"           

 

     zinc            No                       Notes:           [facility]



     sulfate      4-09                               (Zinc           l



               14:00:                               sulfate           [facility]



               00                                 capsule) -           



                                                  220 mg           



                                                  Zinc           



                                                  sulfate =           



                                                  50 mg           



                                                  elemental           



                                                  zinc Same           



                                                  as Zinc           



                                                  Sulfate           

 

     ascorbic            No                       Notes:           [facility]



     acid      4-09                               (Same as:           l



               14:00:                               Vitamin C)           [facility]



               00                                                

 

     celecoxib            No                       Notes:           [facility]

a



               4-09                               NSAID.           l



               14:00:                               Please           [place]



               00                                 check           



                                                  indication           



                                                  . Not for           



                                                  seizure.           



                                                  (Same As:           



                                                  CeleBREX)           

 

     Lidoderm 5%            No                       Notes:           [facility]

[facility]



     topical      -                               Apply only           l



     film      14:00:                               once for           [place]



     (patch)      00                                 up to 12           



                                                  hours in a           



                                                  24-hour           



                                                  period (12           



                                                  hours on           



                                                  and 12           



                                                  hours           



                                                  off).           



                                                  (Same as:           



                                                  [facility]           



                                                  Lidocaine           



                                                  Patch)           



                                                  "Remove           



                                                  old patch           



                                                  before           



                                                  applicatio           



                                                  n of new           



                                                  patch"           

 

     zinc            No                       Notes:           [facility]



     sulfate      4-09                               (Zinc           l



               14:00:                               sulfate           [place]



               00                                 capsule) -           



                                                  220 mg           



                                                  Zinc           



                                                  sulfate =           



                                                  50 mg           



                                                  elemental           



                                                  zinc  Same           



                                                  as Zinc           



                                                  Sulfate           

 

     ascorbic            No                       Notes:           [facility]



     acid      4-09                               (Same as:           l



               14:00:                               Vitamin C)           [place]



               00                                                

 

     celecoxib            No                       Notes:           [facility]

a



               4-09                               NSAID.           l



               14:00:                               Please           [place]



               00                                 check           



                                                  indication           



                                                  . Not for           



                                                  seizure.           



                                                  (Same As:           



                                                  CeleBREX)           

 

     Lidoderm 5%            No                       Notes:           [facility]

[facility]



     topical      -09                               Apply only           l



     film      14:00:                               once for           [place]



     (patch)      00                                 up to 12           



                                                  hours in a           



                                                  24-hour           



                                                  period (12           



                                                  hours on           



                                                  and 12           



                                                  hours           



                                                  off).           



                                                  (Same as:           



                                                  [facility]           



                                                  Lidocaine           



                                                  Patch)           



                                                  "Remove           



                                                  old patch           



                                                  before           



                                                  applicatio           



                                                  n of new           



                                                  patch"           

 

     zinc            No                       Notes:           [facility]



     sulfate      4-09                               (Zinc           l



               14:00:                               sulfate           [place]



               00                                 capsule) -           



                                                  220 mg           



                                                  Zinc           



                                                  sulfate =           



                                                  50 mg           



                                                  elemental           



                                                  zinc Same           



                                                  as Zinc           



                                                  Sulfate           

 

     ascorbic            No                       Notes:           [facility]



     acid      4-09                               (Same as:           l



               14:00:                               Vitamin C)           [place]



               00                                                

 

     celecoxib            No                       Notes:           [facility]

a



               4-09                               NSAID.           l



               14:00:                               Please           [place]



               00                                 check           



                                                  indication           



                                                  . Not for           



                                                  seizure.           



                                                  (Same As:           



                                                  CeleBREX)           

 

     Lidoderm 5%            No                       Notes:           [facility]

[facility]



     topical      4-09                               Apply only           l



     film      14:00:                               once for           [facility]



     (patch)      00                                 up to 12           



                                                  hours in a           



                                                  24-hour           



                                                  period (12           



                                                  hours on           



                                                  and 12           



                                                  hours           



                                                  off).           



                                                  (Same as:           



                                                  [facility]           



                                                  Lidocaine           



                                                  Patch)           



                                                  "Remove           



                                                  old patch           



                                                  before           



                                                  applicatio           



                                                  n of new           



                                                  patch"           

 

     zinc            No                       Notes:           [facility]



     sulfate      4-09                               (Zinc           l



               14:00:                               sulfate           [place]



               00                                 capsule) -           



                                                  220 mg           



                                                  Zinc           



                                                  sulfate =           



                                                  50 mg           



                                                  elemental           



                                                  zinc Same           



                                                  as Zinc           



                                                  Sulfate           

 

     ascorbic            No                       Notes:           [facility]



     acid      4-09                               (Same as:           l



               14:00:                               Vitamin C)           [facility]



                                                               

 

     celecoxib            No                       Notes:           [facility]

a



               4-09                               NSAID.           l



               14:00:                               Please           [place]



               00                                 check           



                                                  indication           



                                                  . Not for           



                                                  seizure.           



                                                  (Same As:           



                                                  CeleBREX)           

 

     Lidoderm 5%            No                       Notes:           [facility]

[facility]



     topical      4-09                               Apply only           l



     film      14:00:                               once for           [place]



     (patch)      00                                 up to 12           



                                                  hours in a           



                                                  24-hour           



                                                  period (12           



                                                  hours on           



                                                  and 12           



                                                  hours           



                                                  off).           



                                                  (Same as:           



                                                  [facility]           



                                                  Lidocaine           



                                                  Patch)           



                                                  "Remove           



                                                  old patch           



                                                  before           



                                                  applicatio           



                                                  n of new           



                                                  patch"           

 

     zinc            No                       Notes:           [facility]



     sulfate      4-09                               (Zinc           l



               14:00:                               sulfate           [place]



               00                                 capsule) -           



                                                  220 mg           



                                                  Zinc           



                                                  sulfate =           



                                                  50 mg           



                                                  elemental           



                                                  zinc Same           



                                                  as Zinc           



                                                  Sulfate           

 

     ascorbic            No                       Notes:           [facility]



     acid      4-09                               (Same as:           l



               14:00:                               Vitamin C)           [facility]



                                                               

 

     celecoxib            No                       Notes:           [facility]

a



               4-09                               NSAID.           l



               14:00:                               Please           [facility]



               00                                 check           



                                                  indication           



                                                  . Not for           



                                                  seizure.           



                                                  (Same As:           



                                                  CeleBREX)           

 

     Lidoderm 5%            No                       Notes:           [facility]

[facility]



     topical      4-09                               Apply only           l



     film      14:00:                               once for           [place]



     (patch)      00                                 up to 12           



                                                  hours in a           



                                                  24-hour           



                                                  period (12           



                                                  hours on           



                                                  and 12           



                                                  hours           



                                                  off).           



                                                  (Same as:           



                                                  [facility]           



                                                  Lidocaine           



                                                  Patch)           



                                                  "Remove           



                                                  old patch           



                                                  before           



                                                  applicatio           



                                                  n of new           



                                                  patch"           

 

     zinc      2022-0      No                       Notes:           [facility]



     sulfate      4-09                               (Zinc           l



               14:00:                               sulfate           [facility]



               00                                 capsule) -           



                                                  220 mg           



                                                  Zinc           



                                                  sulfate =           



                                                  50 mg           



                                                  elemental           



                                                  zinc Same           



                                                  as Zinc           



                                                  Sulfate           

 

     cefepime +      -0      No                       Notes:           [facility]

ia



     sterile                                     (Same As:           l



     water 10 mL      12:00:                               Maxipime)           [facility]

[facility]



               00                                 ***            



                                                  MEDICATION           



                                                  WASTE ***           



                                                  Product           



                                                  Size: 1000           



                                                  mg Product           



                                                  Wasted:           



                                                  _0__ mg           

 

     cefepime +      -0      No                       Notes:           [facility]

[facility]



     sterile      -                               (Same As:           l



     water 10 mL      12:00:                               Maxipime)           [facility]

[facility]



               00                                 ***            



                                                  MEDICATION           



                                                  WASTE ***           



                                                  Product           



                                                  Size: 1000           



                                                  mg Product           



                                                  Wasted:           



                                                  _0__ mg           

 

     cefepime +      -0      No                       Notes:           [facility]

[facility]



     sterile                                     (Same As:           l



     water 10 mL      12:00:                               Maxipime)           [facility]

[facility]



               00                                 ***            



                                                  MEDICATION           



                                                  WASTE ***           



                                                  Product           



                                                  Size: 1000           



                                                  mg Product           



                                                  Wasted:           



                                                  _0__ mg           

 

     cefepime +      -0      No                       Notes:           [facility]

[facility]



     sterile                                     (Same As:           l



     water 10 mL      12:00:                               Maxipime)           [facility]

[facility]



               00                                 ***            



                                                  MEDICATION           



                                                  WASTE ***           



                                                  Product           



                                                  Size: 1000           



                                                  mg Product           



                                                  Wasted:           



                                                  _0__ mg           

 

     cefepime +      -0      No                       Notes:           [facility]

[facility]



     sterile                                     (Same As:           l



     water 10 mL      12:00:                               Maxipime)           [facility]

[facility]



               00                                 ***            



                                                  MEDICATION           



                                                  WASTE ***           



                                                  Product           



                                                  Size: 1000           



                                                  mg Product           



                                                  Wasted:           



                                                  _0__ mg           

 

     cefepime +      -0      No                       Notes:           [facility]

[facility]



     sterile      -                               (Same As:           l



     water 10 mL      12:00:                               Maxipime)           [facility]

[facility]



               00                                 ***            



                                                  MEDICATION           



                                                  WASTE ***           



                                                  Product           



                                                  Size: 1000           



                                                  mg Product           



                                                  Wasted:           



                                                  _0__ mg           

 

     cefepime +      2022-0      No                       Notes:           [facility]

[facility]



     sterile      -                               (Same As:           l



     water 10 mL      12:00:                               Maxipime)           [facility]

[facility]



               00                                 ***            



                                                  MEDICATION           



                                                  WASTE ***           



                                                  Product           



                                                  Size: 1000           



                                                  mg Product           



                                                  Wasted:           



                                                  _0__ mg           

 

     cefepime +      -0      No                       Notes:           [facility]

[facility]



     sterile      -                               (Same As:           l



     water 10 mL      12:00:                               Maxipime)           [facility]

[facility]



               00                                 ***            



                                                  MEDICATION           



                                                  WASTE ***           



                                                  Product           



                                                  Size: 1000           



                                                  mg Product           



                                                  Wasted:           



                                                  _0__ mg           

 

     hydromorpho      -      No                       Notes:           [facility]

[facility]



     ne        4-09                               (Same as:           l



               09:39:                               Dilaudid)           [facility]



                                                               

 

     hydromorpho      -0      No                       Notes:           [facility]

[facility]



     ne        4-09                               (Same as:           l



               09:39:                               Dilaudid)           [facility]



                                                               

 

     hydromorpho      -0      No                       Notes:           [facility]

[facility]



     ne        4-09                               (Same as:           l



               09:39:                               Dilaudid)           [facility]



                                                               

 

     hydromorpho      -0      No                       Notes:           [facility]

[facility]



     ne        4-09                               (Same as:           l



               09:39:                               Dilaudid)           [facility]



                                                               

 

     hydromorpho      -0      No                       Notes:           [facility]

[facility]



     ne        4-09                               (Same as:           l



               09:39:                               Dilaudid)           [facility]



                                                               

 

     hydromorpho      -0      No                       Notes:           [facility]

[facility]



     ne        4-09                               (Same as:           l



               09:39:                               Dilaudid)           [facility]



                                                               

 

     hydromorpho      -0      No                       Notes:           [facility]

[facility]



     ne        4-09                               (Same as:           l



               09:39:                               Dilaudid)           [facility]



                                                               

 

     hydromorpho      -0      No                       Notes:           [facility]

[facility]



     ne        4-09                               (Same as:           l



               09:39:                               Dilaudid)           [facility]



                                                               

 

     methocarbam      -0      No                       Notes:           [facility]

[facility]



     ol        4-09                               (Same           l



               05:00:                               as:Robaxin           [place]



               00                                 )              

 

     methocarbam      -0      No                       Notes:           [facility]

[facility]



     ol        4-09                               (Same           l



               05:00:                               as:Robaxin           [place]



               00                                 )              

 

     methocarbam      -0      No                       Notes:           [facility]

[facility]



     ol        4-09                               (Same           l



               05:00:                               as:Robaxin           [place]



               00                                 )              

 

     methocarbam      -0      No                       Notes:           [facility]

[facility]



     ol        4-09                               (Same           l



               05:00:                               as:Robaxin           [place]



               00                                 )              

 

     methocarbam      -0      No                       Notes:           [facility]

[facility]



     ol        4-09                               (Same           l



               05:00:                               as:Robaxin           [place]



               00                                 )              

 

     methocarbam      -0      No                       Notes:           [facility]

[facility]



     ol        4-09                               (Same           l



               05:00:                               as:Robaxin           [place]



               00                                 )              

 

     methocarbam      -0      No                       Notes:           [facility]

[facility]



     ol        4-09                               (Same           l



               05:00:                               as:Robaxin           [place]



               00                                 )              

 

     methocarbam            No                       Notes:           [facility]

[facility]



     ol        4-09                               (Same           l



               05:00:                               as:Robaxin           [place]



               00                                 )              

 

     docusate            No                       Notes:           [facility]



               4-09                               (Same as:           l



               02:00:                               Colace)           [place]



               00                                 (Do Not           



                                                  Crush)           

 

     senna            No                       Notes:           [facility]



               4-09                               (Same as:           l



               02:00:                               Senokot)           [place]



               00                                                

 

     Saline            No                       Notes:           [facility]



     Flush 0.9%      4-09                               (Same as:           l



               02:00:                               BD             [place]



               00                                 Posiflush)           

 

     topiramate            No                       Notes:           [facility]

[facility]



               4-09                               (Same As:           l



               02:00:                               Topamax)           [place]



               00                                 "Do Not           



                                                  Crush"           



                                                  Hazardous           



                                                  Drug Group           



                                                  3:Reproduc           



                                                  tive risk           



                                                  Hazardous           



                                                  Drug --           



                                                  Refer to           



                                                  safe           



                                                  handling           



                                                  procedure           



                                                  [facility]PE Matrix           

 

     remove            No                       Notes:           [facility]



     patch      4-09                               Remove           l



               02:00:                               patch 12           [place]



               00                                 hours           



                                                  after           



                                                  applicatio           



                                                  n each           



                                                  day.           

 

     docusate            No                       Notes:           [facility]



               4-09                               (Same as:           l



               02:00:                               Colace)           [place]



               00                                 (Do Not           



                                                  Crush)           

 

     senna            No                       Notes:           [facility]



               4-09                               (Same as:           l



               02:00:                               Senokot)           [place]



               00                                                

 

     Saline            No                       Notes:           [facility]



     Flush 0.9%      4-09                               (Same as:           l



               02:00:                               BD             [place]



               00                                 Posiflush)           

 

     topiramate            No                       Notes:           [facility]

[facility]



               4-09                               (Same As:           l



               02:00:                               Topamax)           [place]



               00                                 "Do Not           



                                                  Crush"           



                                                  Hazardous           



                                                  Drug Group           



                                                  3:Reproduc           



                                                  tive risk           



                                                  Hazardous           



                                                  Drug --           



                                                  Refer to           



                                                  safe           



                                                  handling           



                                                  procedure           



                                                  PPE Matrix           

 

     remove            No                       Notes:           [facility]



     patch      4-09                               Remove           l



               02:00:                               patch 12           [place]



               00                                 hours           



                                                  after           



                                                  applicatio           



                                                  n each           



                                                  day.           

 

     docusate            No                       Notes:           [facility]



               4-09                               (Same as:           l



               02:00:                               Colace)           [place]



               00                                 (Do Not           



                                                  Crush)           

 

     senna            No                       Notes:           [facility]



               4-09                               (Same as:           l



               02:00:                               Senokot)           [place]



               00                                                

 

     Saline            No                       Notes:           [facility]



     Flush 0.9%      4-09                               (Same as:           l



               02:00:                               BD             [place]



               00                                 Posiflush)           

 

     topiramate            No                       Notes:           [facility]

[facility]



               4-09                               (Same As:           l



               02:00:                               Topamax)           [place]



               00                                 "Do Not           



                                                  Crush"           



                                                  Hazardous           



                                                  Drug Group           



                                                  3:Reproduc           



                                                  tive risk           



                                                  Hazardous           



                                                  Drug --           



                                                  Refer to           



                                                  safe           



                                                  handling           



                                                  procedure           



                                                  PPE Matrix           

 

     remove            No                       Notes:           [facility]



     patch      4-09                               Remove           l



               02:00:                               patch 12           [place]



               00                                 hours           



                                                  after           



                                                  applicatio           



                                                  n each           



                                                  day.           

 

     docusate            No                       Notes:           [facility]



               4-09                               (Same as:           l



               02:00:                               Colace)           [place]



               00                                 (Do Not           



                                                  Crush)           

 

     senna            No                       Notes:           [facility]



               4-09                               (Same as:           l



               02:00:                               Senokot)           [place]



               00                                                

 

     Saline            No                       Notes:           [facility]



     Flush 0.9%      4-09                               (Same as:           l



               02:00:                               BD             [place]



               00                                 Posiflush)           

 

     topiramate            No                       Notes:           [facility]

[facility]



               4-09                               (Same As:           l



               02:00:                               Topamax)           [place]



               00                                 "Do Not           



                                                  Crush"           



                                                  Hazardous           



                                                  Drug Group           



                                                  3:Reproduc           



                                                  tive risk           



                                                  Hazardous           



                                                  Drug --           



                                                  Refer to           



                                                  safe           



                                                  handling           



                                                  procedure           



                                                  [facility]PE Matrix           

 

     remove            No                       Notes:           [facility]



     patch      4-09                               Remove           l



               02:00:                               patch 12           [place]



               00                                 hours           



                                                  after           



                                                  applicatio           



                                                  n each           



                                                  day.           

 

     docusate            No                       Notes:           [facility]



               4-09                               (Same as:           l



               02:00:                               Colace)           [place]



               00                                 (Do Not           



                                                  Crush)           

 

     senna            No                       Notes:           [facility]



               4-09                               (Same as:           l



               02:00:                               Senokot)           [place]



               00                                                

 

     Saline            No                       Notes:           [facility]



     Flush 0.9%      4-09                               (Same as:           l



               02:00:                               BD             [place]



               00                                 Posiflush)           

 

     topiramate            No                       Notes:           [facility]

[facility]



               4-09                               (Same As:           l



               02:00:                               Topamax)           [place]



               00                                 "Do Not           



                                                  Crush"           



                                                  Hazardous           



                                                  Drug Group           



                                                  3:Reproduc           



                                                  tive risk           



                                                  Hazardous           



                                                  Drug --           



                                                  Refer to           



                                                  safe           



                                                  handling           



                                                  procedure           



                                                  [facility]PE Matrix           

 

     remove            No                       Notes:           [facility]



     patch      4-09                               Remove           l



               02:00:                               patch 12           [place]



               00                                 hours           



                                                  after           



                                                  applicatio           



                                                  n each           



                                                  day.           

 

     docusate            No                       Notes:           [facility]



               4-09                               (Same as:           l



               02:00:                               Colace)           [place]



               00                                 (Do Not           



                                                  Crush)           

 

     senna            No                       Notes:           [facility]



               4-09                               (Same as:           l



               02:00:                               Senokot)           [place]



               00                                                

 

     Saline            No                       Notes:           [facility]



     Flush 0.9%      4-09                               (Same as:           l



               02:00:                               BD             [place]



               00                                 Posiflush)           

 

     topiramate            No                       Notes:           [facility]

[facility]



               4-09                               (Same As:           l



               02:00:                               Topamax)           [place]



               00                                 "Do Not           



                                                  Crush"           



                                                  Hazardous           



                                                  Drug Group           



                                                  3:Reproduc           



                                                  tive risk           



                                                  Hazardous           



                                                  Drug --           



                                                  Refer to           



                                                  safe           



                                                  handling           



                                                  procedure           



                                                  [facility]PE Matrix           

 

     remove            No                       Notes:           [facility]



     patch      4-09                               Remove           l



               02:00:                               patch 12           [place]



               00                                 hours           



                                                  after           



                                                  applicatio           



                                                  n each           



                                                  day.           

 

     docusate            No                       Notes:           [facility]



               4-09                               (Same as:           l



               02:00:                               Colace)           [place]



               00                                 (Do Not           



                                                  Crush)           

 

     senna            No                       Notes:           [facility]



               4-09                               (Same as:           l



               02:00:                               Senokot)           [place]



               00                                                

 

     Saline            No                       Notes:           [facility]



     Flush 0.9%      4-09                               (Same as:           l



               02:00:                               BD             [place]



               00                                 Posiflush)           

 

     topiramate            No                       Notes:           [facility]

[facility]



               4-09                               (Same As:           l



               02:00:                               Topamax)           [place]



               00                                 "Do Not           



                                                  Crush"           



                                                  Hazardous           



                                                  Drug Group           



                                                  3:Reproduc           



                                                  tive risk           



                                                  Hazardous           



                                                  Drug --           



                                                  Refer to           



                                                  safe           



                                                  handling           



                                                  procedure           



                                                  PPE Matrix           

 

     remove            No                       Notes:           [facility]



     patch      4-09                               Remove           l



               02:00:                               patch 12           [place]



               00                                 hours           



                                                  after           



                                                  applicatio           



                                                  n each           



                                                  day.           

 

     docusate            No                       Notes:           [facility]



               4-09                               (Same as:           l



               02:00:                               Colace)           [facility]



               00                                 (Do Not           



                                                  Crush)           

 

     senna            No                       Notes:           [facility]



               4-09                               (Same as:           l



               02:00:                               Senokot)           [facility]



               00                                                

 

     Saline            No                       Notes:           [facility]



     Flush 0.9%      4-09                               (Same as:           l



               02:00:                               BD             [facility]



               00                                 Posiflush)           

 

     topiramate            No                       Notes:           [facility]

ia



                                              (Same As:           l



               02:00:                               Topamax)           [place]



               00                                 "Do Not           



                                                  Crush"           



                                                  Hazardous           



                                                  Drug Group           



                                                  3:Reproduc           



                                                  tive risk           



                                                  Hazardous           



                                                  Drug --           



                                                  Refer to           



                                                  safe           



                                                  handling           



                                                  procedure           



                                                  PPE Matrix           

 

     remove            No                       Notes:           [facility]



     patch                                     Remove           l



               02:00:                               patch 12           [place]



               00                                 hours           



                                                  after           



                                                  applicatio           



                                                  n each           



                                                  day.           

 

     acetaminoph            No                       Notes: Max           

[facility]



     en                                       acetaminop           l



               01:00:                               hen 4000           [place]



               00                                 mg/day (4           



                                                  gm/day).           



                                                  (Same as:           



                                                  Tylenol           



                                                  Extra           



                                                  Strength)           

 

     acetaminoph            No                       Notes: Max           

[facility]



     en                                       acetaminop           l



               01:00:                               hen 4000           [facility]



               00                                 mg/day (4           



                                                  gm/day).           



                                                  (Same as:           



                                                  Tylenol           



                                                  Extra           



                                                  Strength)           

 

     acetaminoph            No                       Notes: Max           

[facility]



     en                                       acetaminop           l



               01:00:                               hen 4000           [place]



               00                                 mg/day (4           



                                                  gm/day).           



                                                  (Same as:           



                                                  Tylenol           



                                                  Extra           



                                                  Strength)           

 

     acetaminoph            No                       Notes: Max           

[facility]



     en                                       acetaminop           l



               01:00:                               hen 4000           [facility]



               00                                 mg/day (4           



                                                  gm/day).           



                                                  (Same as:           



                                                  Tylenol           



                                                  Extra           



                                                  Strength)           

 

     acetaminoph            No                       Notes: Max           

[facility]



     en                                       acetaminop           l



               01:00:                               hen 4000           [facility]



               00                                 mg/day (4           



                                                  gm/day).           



                                                  (Same as:           



                                                  Tylenol           



                                                  Extra           



                                                  Strength)           

 

     acetaminoph            No                       Notes: Max           

[facility]



     en                                       acetaminop           l



               01:00:                               hen 4000           [place]



               00                                 mg/day (4           



                                                  gm/day).           



                                                  (Same as:           



                                                  Tylenol           



                                                  Extra           



                                                  Strength)           

 

     acetaminoph            No                       Notes: Max           

[facility]



     en                                       acetaminop           l



               01:00:                               hen 4000           [place]



               00                                 mg/day (4           



                                                  gm/day).           



                                                  (Same as:           



                                                  Tylenol           



                                                  Extra           



                                                  Strength)           

 

     acetaminoph            No                       Notes: Max           

[facility]



     en                                       acetaminop           l



               01:00:                               hen 4000           [place]



               00                                 mg/day (4           



                                                  gm/day).           



                                                  (Same as:           



                                                  Tylenol           



                                                  Extra           



                                                  Strength)           

 

     oxyCODONE            No                       Notes:           [facility]

a



     immediate      4-09                               (Same as:           l



     release      00:09:                               Roxicodone           [facility]

[place]



               00                                 )              

 

     acetaminoph            No                       Notes: Do           M

[facility]



     en-hydrocod      4-09                               not exceed           l



     one 325      00:09:                               4gm/day of           [facility]

[place]



     mg-10 mg      00                                 acetaminop           



     oral tablet                                         hen. (Same           



                                                  as: Norco           



                                                  325/10)           

 

     oxyCODONE      0      No                       Notes:           [facility]

a



     immediate      4-09                               (Same as:           l



     release      00:09:                               Roxicodone           [facility]

[place]



               00                                 )              

 

     acetaminoph            No                       Notes: Do           M

[facility]



     en-hydrocod      4-09                               not exceed           l



     one 325      00:09:                               4gm/day of           [facility]

[place]



     mg-10 mg      00                                 acetaminop           



     oral tablet                                         hen. (Same           



                                                  as: Norco           



                                                  325/10)           

 

     oxyCODONE            No                       Notes:           [facility]

a



     immediate      4-09                               (Same as:           l



     release      00:09:                               Roxicodone           [facility]

[place]



               00                                 )              

 

     acetaminoph            No                       Notes: Do           M

[facility]



     en-hydrocod      4-09                               not exceed           l



     one 325      00:09:                               4gm/day of           [facility]

[place]



     mg-10 mg      00                                 acetaminop           



     oral tablet                                         hen. (Same           



                                                  as: Norco           



                                                  325/10)           

 

     oxyCODONE      0      No                       Notes:           [facility]

a



     immediate      4-09                               (Same as:           l



     release      00:09:                               Roxicodone           [facility]

[place]



               00                                 )              

 

     acetaminoph            No                       Notes: Do           M

[facility]



     en-hydrocod      4-09                               not exceed           l



     one 325      00:09:                               4gm/day of           [facility]

[place]



     mg-10 mg      00                                 acetaminop           



     oral tablet                                         hen. (Same           



                                                  as: Norco           



                                                  325/10)           

 

     oxyCODONE      0      No                       Notes:           [facility]

a



     immediate      4-09                               (Same as:           l



     release      00:09:                               Roxicodone           [facility]

[place]



               00                                 )              

 

     acetaminoph            No                       Notes: Do           M

[facility]



     en-hydrocod      4-09                               not exceed           l



     one 325      00:09:                               4gm/day of           [facility]

[place]



     mg-10 mg      00                                 acetaminop           



     oral tablet                                         hen. (Same           



                                                  as: Norco           



                                                  325/10)           

 

     oxyCODONE      0      No                       Notes:           [facility]

a



     immediate      4-09                               (Same as:           l



     release      00:09:                               Roxicodone           [facility]

[place]



               00                                 )              

 

     acetaminoph            No                       Notes: Do           M

[facility]



     en-hydrocod      4-09                               not exceed           l



     one 325      00:09:                               4gm/day of           [facility]

[place]



     mg-10 mg      00                                 acetaminop           



     oral tablet                                         hen. (Same           



                                                  as: Norco           



                                                  325/10)           

 

     oxyCODONE            No                       Notes:           [facility]

a



     immediate      4-09                               (Same as:           l



     release      00:09:                               Roxicodone           [facility]

[place]



               00                                 )              

 

     acetaminoph            No                       Notes: Do           M

[facility]



     en-hydrocod      4-09                               not exceed           l



     one 325      00:09:                               4gm/day of           [facility]

[place]



     mg-10 mg      00                                 acetaminop           



     oral tablet                                         hen. (Same           



                                                  as: Norco           



                                                  325/10)           

 

     oxyCODONE            No                       Notes:           [facility]

a



     immediate      4-09                               (Same as:           l



     release      00:09:                               Roxicodone           [facility]

[place]



               00                                 )              

 

     acetaminoph            No                       Notes: Do           M

[facility]



     en-hydrocod      4-09                               not exceed           l



     one 325      00:09:                               4gm/day of           [facility]

[place]



     mg-10 mg      00                                 acetaminop           



     oral tablet                                         hen. (Same           



                                                  as: Norco           



                                                  325/10)           

 

     LORazepam            No                       Notes:           [facility]

a



               4-09                               (Same as:           l



               00:06:                               Ativan)           [place]



                                                               

 

     oxyCODONE            No                       Notes:           [facility]

a



     immediate      4-09                               (Same as:           l



     release      00:06:                               Roxicodone           [facility]

[place]



               00                                 )              

 

     LORazepam            No                       Notes:           [facility]

a



               4-09                               (Same as:           l



               00:06:                               Ativan)           [facility]



                                                               

 

     oxyCODONE            No                       Notes:           [facility]

a



     immediate      4-09                               (Same as:           l



     release      00:06:                               Roxicodone           [facility]

[place]



               00                                 )              

 

     LORazepam            No                       Notes:           [facility]

a



               4-09                               (Same as:           l



               00:06:                               Ativan)           [place]



                                                               

 

     oxyCODONE            No                       Notes:           [facility]

a



     immediate      4-09                               (Same as:           l



     release      00:06:                               Roxicodone           [facility]

[place]



               00                                 )              

 

     LORazepam            No                       Notes:           [facility]

a



               4-09                               (Same as:           l



               00:06:                               Ativan)           [place]



                                                               

 

     oxyCODONE            No                       Notes:           [facility]

a



     immediate      4-09                               (Same as:           l



     release      00:06:                               Roxicodone           [facility]

[place]



               00                                 )              

 

     LORazepam            No                       Notes:           [facility]

a



               4-09                               (Same as:           l



               00:06:                               Ativan)           [facility]



                                                               

 

     oxyCODONE            No                       Notes:           [facility]

a



     immediate      4-09                               (Same as:           l



     release      00:06:                               Roxicodone           [facility]

[place]



               00                                 )              

 

     LORazepam            No                       Notes:           [facility]

a



               4-09                               (Same as:           l



               00:06:                               Ativan)           [place]



                                                               

 

     oxyCODONE            No                       Notes:           [facility]

a



     immediate      4-09                               (Same as:           l



     release      00:06:                               Roxicodone           [facility]

[place]



               00                                 )              

 

     LORazepam            No                       Notes:           [facility]

a



               4-09                               (Same as:           l



               00:06:                               Ativan)           [place]



                                                               

 

     oxyCODONE            No                       Notes:           [facility]

a



     immediate      4-09                               (Same as:           l



     release      00:06:                               Roxicodone           [facility]

[place]



               00                                 )              

 

     LORazepam            No                       Notes:           [facility]

a



               4-09                               (Same as:           l



               00:06:                               Ativan)           [place]



                                                               

 

     oxyCODONE            No                       Notes:           [facility]

a



     immediate      4-09                               (Same as:           l



     release      00:06:                               Roxicodone           [facility]

[place]



                                                )              

 

     Sodium            No                       1,000 mL,           [facility]

a



     Chloride                                     Rate: 75           l



     0.9% IV      23:43:                               ml/hr,           [place]



     1,000 mL      00                                 Infuse           



                                                  over: 13.3           



                                                  hr, Route:           



                                                  IV, Dosing           



                                                  Weight           



                                                  127.727           



                                                  kg, Total           



                                                  Volume:           



                                                  1,000,           



                                                  Start           



                                                  date:           



                                                  22           



                                                  18:43:00           



                                                  CDT,           



                                                  Duration:           



                                                  30 day,           



                                                  Stop date:           



                                                  22           



                                                  18:42:00           



                                                  CDT, BSA:           



                                                  2.37 m2, 0           

 

     acetaminoph            No                       Notes: Do           M

[facility]



     en                                       not exceed           l



               23:43:                               4 gm/day.           [place]



               00                                 (Same as:           



                                                  Tylenol)           

 

     acetaminoph            No                       Notes:           [facility]

[facility]



     en-hydrocod                                     (Same as:           l



     one 325      23:43:                               Norco           [facility]



     mg-5 mg      00                                 325/5) Do           



     oral tablet                                         not exceed           



                                                  4gm/day of           



                                                  acetaminop           



                                                  hen.           

 

     acetaminoph            No                       Notes: Do           M

[facility]



     en-hydrocod      4-08                               not exceed           l



     one 325      23:43:                               4gm/day of           [facility]

[place]



     mg-10 mg      00                                 acetaminop           



     oral tablet                                         hen. (Same           



                                                  as: Norco           



                                                  325/10)           

 

     bisacodyl            No                       Notes:           [facility]

a



               4-08                               (Same As:           l



               23:43:                               Dulcolax,           [place]



                                                [employer])           

 

     hydrALAZINE            No                       Notes:           [facility]

[facility]



               4-08                               (Same as:           l



               23:43:                               Apresoline                                            ) Push           



                                                  over 5           



                                                  minutes           

 

     labetalol            No                       10 mg, 2           [facility]

[facility]



               4-08                               mL, Route:           l



               23:43:                               IVP, Drug                                            form: INJ,           



                                                  Q15Min,           



                                                  Dosing           



                                                  Weight           



                                                  127.727,           



                                                  kg, Start           



                                                  date:           



                                                  22           



                                                  18:43:00           



                                                  CDT,           



                                                  Duration:           



                                                  3 doses or           



                                                  times,           



                                                  Stop date:           



                                                  22           



                                                  19:13:00           



                                                  CDT, 0           

 

     Saline            No                       Notes:           [facility]



     Flush 0.9%      4-08                               (Same as:           l



               23:43:                               BD             [place]                                 Posiflush)           

 

     Sodium            No                       1,000 mL,           [facility]

a



     Chloride      -08                               Rate: 75           l



     0.9% IV      23:43:                               ml/hr,           [facility]



     1,000 mL      00                                 Infuse           



                                                  over: 13.3           



                                                  hr, Route:           



                                                  IV, Dosing           



                                                  Weight           



                                                  127.727           



                                                  kg, Total           



                                                  Volume:           



                                                  1,000,           



                                                  Start           



                                                  date:           



                                                  22           



                                                  18:43:00           



                                                  CDT,           



                                                  Duration:           



                                                  30 day,           



                                                  Stop date:           



                                                  22           



                                                  18:42:00           



                                                  CDT, BSA:           



                                                  2.37 m2, 0           

 

     acetaminoph            No                       Notes: Do           [facility]

[facility]



     en        4-08                               not exceed           l



               23:43:                               4 gm/day.           [place]



               00                                 (Same as:           



                                                  Tylenol)           

 

     acetaminoph            No                       Notes:           [facility]

[facility]



     en-hydrocod      4-08                               (Same as:           l



     one 325      23:43:                               Norco           [place]



     mg-5 mg      00                                 325/5) Do           



     oral tablet                                         not exceed           



                                                  4gm/day of           



                                                  acetaminop           



                                                  hen.           

 

     acetaminoph            No                       Notes: Do           [facility]

[facility]



     en-hydrocod      4-08                               not exceed           l



     one 325      23:43:                               4gm/day of           [facility]

[place]



     mg-10 mg      00                                 acetaminop           



     oral tablet                                         hen. (Same           



                                                  as: Norco           



                                                  325/10)           

 

     bisacodyl            No                       Notes:           [facility]

a



               4-08                               (Same As:           l



               23:43:                               Dulcolax,           [place]                                 Bisco-Lax)           

 

     hydrALAZINE            No                       Notes:           [facility]

[facility]



               4-08                               (Same as:           l



               23:43:                               Apresoline                                            ) Push           



                                                  over 5           



                                                  minutes           

 

     labetalol            No                       10 mg, 2           [facility]

[facility]



               4-08                               mL, Route:           l



               23:43:                               IVP, Drug                                            form: INJ,           



                                                  Q15Min,           



                                                  Dosing           



                                                  Weight           



                                                  127.727,           



                                                  kg, Start           



                                                  date:           



                                                  22           



                                                  18:43:00           



                                                  CDT,           



                                                  Duration:           



                                                  3 doses or           



                                                  times,           



                                                  Stop date:           



                                                  22           



                                                  19:13:00           



                                                  CDT, 0           

 

     Saline            No                       Notes:           [facility]



     Flush 0.9%      4-08                               (Same as:           l



               23:43:                               BD                                              Posiflush)           

 

     Sodium            No                       1,000 mL,           [facility]

a



     Chloride      -08                               Rate: 75           l



     0.9% IV      23:43:                               ml/hr,           [facility]



     1,000 mL      00                                 Infuse           



                                                  over: 13.3           



                                                  hr, Route:           



                                                  IV, Dosing           



                                                  Weight           



                                                  127.727           



                                                  kg, Total           



                                                  Volume:           



                                                  1,000,           



                                                  Start           



                                                  date:           



                                                  22           



                                                  18:43:00           



                                                  CDT,           



                                                  Duration:           



                                                  30 day,           



                                                  Stop date:           



                                                  22           



                                                  18:42:00           



                                                  CDT, BSA:           



                                                  2.37 m2, 0           

 

     acetaminoph            No                       Notes: Do           [facility]

[facility]



     en        4-08                               not exceed           l



               23:43:                               4 gm/day.           [facility]



                                                (Same as:           



                                                  Tylenol)           

 

     acetaminoph            No                       Notes:           [facility]

[facility]



     en-hydrocod      4-08                               (Same as:           l



     one 325      23:43:                               Norco           [place]



     mg-5 mg      00                                 325/5) Do           



     oral tablet                                         not exceed           



                                                  4gm/day of           



                                                  acetaminop           



                                                  hen.           

 

     acetaminoph            No                       Notes: Do           [facility]

[facility]



     en-hydrocod      4-08                               not exceed           l



     one 325      23:43:                               4gm/day of           [facility]

[place]



     mg-10 mg      00                                 acetaminop           



     oral tablet                                         hen. (Same           



                                                  as: Norco           



                                                  325/10)           

 

     bisacodyl            No                       Notes:           [facility]

a



               4-08                               (Same As:           l



               23:43:                               Dulcolax,           [facility]



                                                Bisco-Lax)           

 

     hydrALAZINE            No                       Notes:           [facility]

[facility]



               4-08                               (Same as:           l



               23:43:                               Apresoline           [place]                                 ) Push           



                                                  over 5           



                                                  minutes           

 

     labetalol            No                       10 mg, 2           [facility]

[facility]



               4-08                               mL, Route:           l



               23:43:                               IVP, Drug                                            form: INJ,           



                                                  Q15Min,           



                                                  Dosing           



                                                  Weight           



                                                  127.727,           



                                                  kg, Start           



                                                  date:           



                                                  22           



                                                  18:43:00           



                                                  CDT,           



                                                  Duration:           



                                                  3 doses or           



                                                  times,           



                                                  Stop date:           



                                                  22           



                                                  19:13:00           



                                                  CDT, 0           

 

     Saline            No                       Notes:           [facility]



     Flush 0.9%      -08                               (Same as:           l



               23:43:                               BD             [place]                                 Posiflush)           

 

     Sodium            No                       1,000 mL,           [facility]

a



     Chloride                                     Rate: 75           l



     0.9% IV      23:43:                               ml/hr,           [facility]



     1,000 mL      00                                 Infuse           



                                                  over: 13.3           



                                                  hr, Route:           



                                                  IV, Dosing           



                                                  Weight           



                                                  127.727           



                                                  kg, Total           



                                                  Volume:           



                                                  1,000,           



                                                  Start           



                                                  date:           



                                                  22           



                                                  18:43:00           



                                                  CDT,           



                                                  Duration:           



                                                  30 day,           



                                                  Stop date:           



                                                  22           



                                                  18:42:00           



                                                  CDT, BSA:           



                                                  2.37 m2, 0           

 

     acetaminoph            No                       Notes: Do           [facility]

[facility]



     en        4-08                               not exceed           l



               23:43:                               4 gm/day.           [place]



               00                                 (Same as:           



                                                  Tylenol)           

 

     acetaminoph            No                       Notes:           [facility]

[facility]



     en-hydrocod      4-08                               (Same as:           l



     one 325      23:43:                               Norco           [facility]



     mg-5 mg      00                                 325/5) Do           



     oral tablet                                         not exceed           



                                                  4gm/day of           



                                                  acetaminop           



                                                  hen.           

 

     acetaminoph            No                       Notes: Do           [facility]

[facility]



     en-hydrocod      4-08                               not exceed           l



     one 325      23:43:                               4gm/day of           [facility]

[place]



     mg-10 mg      00                                 acetaminop           



     oral tablet                                         hen. (Same           



                                                  as: Norco           



                                                  325/10)           

 

     bisacodyl            No                       Notes:           [facility]

a



               4-08                               (Same As:           l



               23:43:                               Dulcolax,           [facility]                                 Bisco-Lax)           

 

     hydrALAZINE            No                       Notes:           [facility]

[facility]



               4-08                               (Same as:           l



               23:43:                               Apresoline                                            ) Push           



                                                  over 5           



                                                  minutes           

 

     labetalol            No                       10 mg, 2           [facility]

[facility]



               4-08                               mL, Route:           l



               23:43:                               IVP, Drug                                            form: INJ,           



                                                  Q15Min,           



                                                  Dosing           



                                                  Weight           



                                                  127.727,           



                                                  kg, Start           



                                                  date:           



                                                  22           



                                                  18:43:00           



                                                  CDT,           



                                                  Duration:           



                                                  3 doses or           



                                                  times,           



                                                  Stop date:           



                                                  22           



                                                  19:13:00           



                                                  CDT, 0           

 

     Saline            No                       Notes:           [facility]



     Flush 0.9%                                     (Same as:           l



               23:43:                               BD                                              Posiflush)           

 

     Sodium            No                       1,000 mL,           [facility]

a



     Chloride                                     Rate: 75           l



     0.9% IV      23:43:                               ml/hr,           [facility]



     1,000 mL      00                                 Infuse           



                                                  over: 13.3           



                                                  hr, Route:           



                                                  IV, Dosing           



                                                  Weight           



                                                  127.727           



                                                  kg, Total           



                                                  Volume:           



                                                  1,000,           



                                                  Start           



                                                  date:           



                                                  22           



                                                  18:43:00           



                                                  CDT,           



                                                  Duration:           



                                                  30 day,           



                                                  Stop date:           



                                                  22           



                                                  18:42:00           



                                                  CDT, BSA:           



                                                  2.37 m2, 0           

 

     acetaminoph            No                       Notes: Do           [facility]

[facility]



     en        4-08                               not exceed           l



               23:43:                               4 gm/day.           [place]



                                                (Same as:           



                                                  Tylenol)           

 

     acetaminoph            No                       Notes:           [facility]

[facility]



     en-hydrocod      4-08                               (Same as:           l



     one 325      23:43:                               Norco           [facility]



     mg-5 mg      00                                 325/5) Do           



     oral tablet                                         not exceed           



                                                  4gm/day of           



                                                  acetaminop           



                                                  hen.           

 

     acetaminoph            No                       Notes: Do           [facility]

[facility]



     en-hydrocod      4-08                               not exceed           l



     one 325      23:43:                               4gm/day of           [facility]

[place]



     mg-10 mg      00                                 acetaminop           



     oral tablet                                         hen. (Same           



                                                  as: Norco           



                                                  325/10)           

 

     bisacodyl            No                       Notes:           [facility]

a



               4-08                               (Same As:           l



               23:43:                               Dulcolax,           [facility]



                                                Bisco-Lax)           

 

     hydrALAZINE            No                       Notes:           [facility]

[facility]



               4-08                               (Same as:           l



               23:43:                               Apresoline                                            ) Push           



                                                  over 5           



                                                  minutes           

 

     labetalol            No                       10 mg, 2           [facility]

[facility]



               4-08                               mL, Route:           l



               23:43:                               IVP, Drug                                            form: INJ,           



                                                  Q15Min,           



                                                  Dosing           



                                                  Weight           



                                                  127.727,           



                                                  kg, Start           



                                                  date:           



                                                  22           



                                                  18:43:00           



                                                  CDT,           



                                                  Duration:           



                                                  3 doses or           



                                                  times,           



                                                  Stop date:           



                                                  22           



                                                  19:13:00           



                                                  CDT, 0           

 

     Saline            No                       Notes:           [facility]



     Flush 0.9%      4-08                               (Same as:           l



               23:43:                               BD                                              Posiflush)           

 

     Sodium            No                       1,000 mL,           [facility]

a



     Chloride      -08                               Rate: 75           l



     0.9% IV      23:43:                               ml/hr,           Jose



     1,000 mL      00                                 Infuse           



                                                  over: 13.3           



                                                  hr, Route:           



                                                  IV, Dosing           



                                                  Weight           



                                                  127.727           



                                                  kg, Total           



                                                  Volume:           



                                                  1,000,           



                                                  Start           



                                                  date:           



                                                  22           



                                                  18:43:00           



                                                  CDT,           



                                                  Duration:           



                                                  30 day,           



                                                  Stop date:           



                                                  22           



                                                  18:42:00           



                                                  CDT, BSA:           



                                                  2.37 m2, 0           

 

     acetaminoph            No                       Notes: Do           M

[facility]



     en        4-08                               not exceed           l



               23:43:                               4 gm/day.           [place]



               00                                 (Same as:           



                                                  Tylenol)           

 

     acetaminoph            No                       Notes:           [facility]

rajeev



     en-hydrocod      4-08                               (Same as:           l



     one 325      23:43:                               Norco           Jose



     mg-5 mg      00                                 325/5) Do           



     oral tablet                                         not exceed           



                                                  4gm/day of           



                                                  acetaminop           



                                                  hen.           

 

     acetaminoph            No                       Notes: Do           M

[facility]



     en-hydrocod      4-08                               not exceed           l



     one 325      23:43:                               4gm/day of           [facility]

[place]



     mg-10 mg      00                                 acetaminop           



     oral tablet                                         hen. (Same           



                                                  as: Norco           



                                                  325/10)           

 

     bisacodyl            No                       Notes:           [facility]

a



               4-08                               (Same As:           l



               23:43:                               Dulcolax,           Jose



               00                                 Bisco-Lax)           

 

     hydrALAZINE            No                       Notes:           [facility]

rajeev



               4-08                               (Same as:           l



               23:43:                               Apresoline                                            ) Push           



                                                  over 5           



                                                  minutes           

 

     labetalol            No                       10 mg, 2           [facility]

rajeev



               4-08                               mL, Route:           l



               23:43:                               IVP, Drug                                            form: INJ,           



                                                  Q15Min,           



                                                  Dosing           



                                                  Weight           



                                                  127.727,           



                                                  kg, Start           



                                                  date:           



                                                  22           



                                                  18:43:00           



                                                  CDT,           



                                                  Duration:           



                                                  3 doses or           



                                                  times,           



                                                  Stop date:           



                                                  22           



                                                  19:13:00           



                                                  CDT, 0           

 

     Saline            No                       Notes:           [facility]



     Flush 0.9%      4-08                               (Same as:           l



               23:43:                               BD                                              Posiflush)           

 

     Sodium            No                       1,000 mL,           [facility]

a



     Chloride      -08                               Rate: 75           l



     0.9% IV      23:43:                               ml/hr,           [place]



     1,000 mL      00                                 Infuse           



                                                  over: 13.3           



                                                  hr, Route:           



                                                  IV, Dosing           



                                                  Weight           



                                                  127.727           



                                                  kg, Total           



                                                  Volume:           



                                                  1,000,           



                                                  Start           



                                                  date:           



                                                  22           



                                                  18:43:00           



                                                  CDT,           



                                                  Duration:           



                                                  30 day,           



                                                  Stop date:           



                                                  22           



                                                  18:42:00           



                                                  CDT, BSA:           



                                                  2.37 m2, 0           

 

     acetaminoph            No                       Notes: Do           M

[facility]



     en        4-08                               not exceed           l



               23:43:                               4 gm/day.           Jose



               00                                 (Same as:           



                                                  Tylenol)           

 

     acetaminoph            No                       Notes:           [facility]

rajeev



     en-hydrocod      4-08                               (Same as:           l



     one 325      23:43:                               Norco           Jose



     mg-5 mg      00                                 325/5) Do           



     oral tablet                                         not exceed           



                                                  4gm/day of           



                                                  acetaminop           



                                                  hen.           

 

     acetaminoph            No                       Notes: Do           M

[facility]



     en-hydrocod      4-08                               not exceed           l



     one 325      23:43:                               4gm/day of           [facility]

[place]



     mg-10 mg      00                                 acetaminop           



     oral tablet                                         hen. (Same           



                                                  as: Norco           



                                                  325/10)           

 

     bisacodyl            No                       Notes:           [facility]

a



               4-08                               (Same As:           l



               23:43:                               Dulcolax,           [place]



               00                                 Bisco-Lax)           

 

     hydrALAZINE            No                       Notes:           [facility]

rajeev



               4-08                               (Same as:           l



               23:43:                               Apresoline                                            ) Push           



                                                  over 5           



                                                  minutes           

 

     labetalol            No                       10 mg, 2           [facility]

rajeev



               4-08                               mL, Route:           l



               23:43:                               IVP, Drug                                            form: INJ,           



                                                  Q15Min,           



                                                  Dosing           



                                                  Weight           



                                                  127.727,           



                                                  kg, Start           



                                                  date:           



                                                  22           



                                                  18:43:00           



                                                  CDT,           



                                                  Duration:           



                                                  3 doses or           



                                                  times,           



                                                  Stop date:           



                                                  22           



                                                  19:13:00           



                                                  CDT, 0           

 

     Saline            No                       Notes:           [facility]



     Flush 0.9%      4-08                               (Same as:           l



               23:43:                               BD                                              Posiflush)           

 

     Sodium            No                       1,000 mL,           [facility]

a



     Chloride      4-08                               Rate: 75           l



     0.9% IV      23:43:                               ml/hr,           [place]



     1,000 mL      00                                 Infuse           



                                                  over: 13.3           



                                                  hr, Route:           



                                                  IV, Dosing           



                                                  Weight           



                                                  127.727           



                                                  kg, Total           



                                                  Volume:           



                                                  1,000,           



                                                  Start           



                                                  date:           



                                                  22           



                                                  18:43:00           



                                                  CDT,           



                                                  Duration:           



                                                  30 day,           



                                                  Stop date:           



                                                  22           



                                                  18:42:00           



                                                  CDT, BSA:           



                                                  2.37 m2, 0           

 

     acetaminoph            No                       Notes: Do           M

[facility]



     en        4-08                               not exceed           l



               23:43:                               4 gm/day.           Jose



               00                                 (Same as:           



                                                  Tylenol)           

 

     acetaminoph            No                       Notes:           [facility]

rajeev



     en-hydrocod      4-08                               (Same as:           l



     one 325      23:43:                               Norco           [place]



     mg-5 mg      00                                 325/5) Do           



     oral tablet                                         not exceed           



                                                  4gm/day of           



                                                  acetaminop           



                                                  hen.           

 

     acetaminoph            No                       Notes: Do           M

[facility]



     en-hydrocod      4-08                               not exceed           l



     one 325      23:43:                               4gm/day of           [facility]

[place]



     mg-10 mg      00                                 acetaminop           



     oral tablet                                         hen. (Same           



                                                  as: Norco           



                                                  325/10)           

 

     bisacodyl            No                       Notes:           [facility]

a



               4-08                               (Same As:           l



               23:43:                               Dulcolax,           [place]



                                                Bisco-Lax)           

 

     hydrALAZINE            No                       Notes:           [facility]

rjaeev



               4-08                               (Same as:           l



               23:43:                               Apresoline                                            ) Push           



                                                  over 5           



                                                  minutes           

 

     labetalol            No                       10 mg, 2           [facility]

rajeev



               4-08                               mL, Route:           l



               23:43:                               IVP, Drug                                            form: INJ,           



                                                  Q15Min,           



                                                  Dosing           



                                                  Weight           



                                                  127.727,           



                                                  kg, Start           



                                                  date:           



                                                  22           



                                                  18:43:00           



                                                  CDT,           



                                                  Duration:           



                                                  3 doses or           



                                                  times,           



                                                  Stop date:           



                                                  22           



                                                  19:13:00           



                                                  CDT, 0           

 

     Saline      -0      No                       Notes:           [facility]



     Flush 0.9%      4-08                               (Same as:           l



               23:43:                               BD                                              Posiflush)           

 

     NaCl 0.9%      2022- No             1000mL      at 999           [facility]

vers



     (NS) bolus                                mL/hr,           ity of



     infusion      05:00: 05:59                          1,000 mL,           Eric

as



     1,000 mL      00   :00                           IV             Medical



                                                  Piggyback,           Branch



                                                  ONCE, 1           



                                                  dose, On           



                                                  22           



                                                  at 0000,           



                                                  STAT           

 

     diphenhydrA      0 - No             25mg      25 mg,           Uni

vers



     MINE                                Slow IV           ity of



     (BENADRYL)      05:00: 04:47                          Push,           [place]



     injection      00   :00                           ONCE, 1           Medical



     25 mg                                         dose, On           Branch



                                                  22           



                                                  at 0000,           



                                                  STAT           

 

     haloperidol      -0 - No             2.5mg      2.5 mg,           U

nivers



     lactate                                Intravenou           ity o

f



     (HALDOL)      05:00: 04:47                          s, ONCE, 1           [facility]

[facility]



     injection      00   :00                           dose, On           [facility]

l



     2.5 mg                                         22           Branch



                                                  at 0000,           



                                                  STAT           

 

     topiramate            Yes                      25 mg = 1           Me

moria



     25 mg oral      3-29                               cap, PO,           l



     capsule      13:44:                               Q12H, # 60           [facility]

[place]



               00                                 cap, 0           



                                                  Refill(s),           



                                                  Pharmacy:           



                                                  [facility]           



                                                  cy #[id number],           



                                                  149.86,           



                                                  cm,            



                                                  22           



                                                  1:18:00           



                                                  CDT,           



                                                  Height,           



                                                  127.727,           



                                                  kg,            



                                                  22           



                                                  1:18:00           



                                                  CDT,           



                                                  Weight           

 

     topiramate            Yes                      25 mg = 1           Me

moria



     25 mg oral      3-29                               cap, PO,           l



     capsule      13:44:                               Q12H, # 60           [facility]erm

[place]



               00                                 cap, 0           



                                                  Refill(s),           



                                                  Pharmacy:           



                                                  [facility]           



                                                  cy #[id number],           



                                                  149.86,           



                                                  cm,            



                                                  22           



                                                  1:18:00           



                                                  CDT,           



                                                  Height,           



                                                  127.727,           



                                                  kg,            



                                                  22           



                                                  1:18:00           



                                                  CDT,           



                                                  Weight           

 

     topiramate      2022-0      Yes                      25 mg = 1           Me

moria



     25 mg oral      3-29                               cap, PO,           l



     capsule      13:44:                               Q12H, # 60           Herm

[place]



               00                                 cap, 0           



                                                  Refill(s),           



                                                  Pharmacy:           



                                                  [facility]           



                                                  cy #[id number],           



                                                  149.86,           



                                                  cm,            



                                                  22           



                                                  1:18:00           



                                                  CDT,           



                                                  Height,           



                                                  127.727,           



                                                  kg,            



                                                  22           



                                                  1:18:00           



                                                  CDT,           



                                                  Weight           

 

     topiramate      2-0      Yes                      25 mg = 1           Me

moria



     25 mg oral      3-29                               cap, PO,           l



     capsule      13:44:                               Q12H, # 60           Herm

[place]



               00                                 cap, 0           



                                                  Refill(s),           



                                                  Pharmacy:           



                                                  [facility]           



                                                  cy #[id number],           



                                                  149.86,           



                                                  cm,            



                                                  22           



                                                  1:18:00           



                                                  CDT,           



                                                  Height,           



                                                  127.727,           



                                                  kg,            



                                                  22           



                                                  1:18:00           



                                                  CDT,           



                                                  Weight           

 

     topiramate      2-0      Yes                      25 mg = 1           Me

moria



     25 mg oral      3-29                               cap, PO,           l



     capsule      13:44:                               Q12H, # 60           [facility]

[place]



               00                                 cap, 0           



                                                  Refill(s),           



                                                  Pharmacy:           



                                                  [facility]           



                                                  cy #[id number],           



                                                  149.86,           



                                                  cm,            



                                                  22           



                                                  1:18:00           



                                                  CDT,           



                                                  Height,           



                                                  127.727,           



                                                  kg,            



                                                  22           



                                                  1:18:00           



                                                  CDT,           



                                                  Weight           

 

     topiramate      2-0      Yes                      25 mg = 1           Me

moria



     25 mg oral      3-29                               cap, PO,           l



     capsule      13:44:                               Q12H, # 60           [facility]

[place]



               00                                 cap, 0           



                                                  Refill(s),           



                                                  Pharmacy:           



                                                  [facility]           



                                                  cy #[id number],           



                                                  149.86,           



                                                  cm,            



                                                  22           



                                                  1:18:00           



                                                  CDT,           



                                                  Height,           



                                                  127.727,           



                                                  kg,            



                                                  22           



                                                  1:18:00           



                                                  CDT,           



                                                  Weight           

 

     topiramate      2022-0      Yes                      25 mg = 1           Me

moria



     25 mg oral      3-29                               cap, PO,           l



     capsule      13:44:                               Q12H, # 60           [facility]

[place]



               00                                 cap, 0           



                                                  Refill(s),           



                                                  Pharmacy:           



                                                  [facility]           



                                                  cy #[id number],           



                                                  149.86,           



                                                  cm,            



                                                  22           



                                                  1:18:00           



                                                  CDT,           



                                                  Height,           



                                                  127.727,           



                                                  kg,            



                                                  22           



                                                  1:18:00           



                                                  CDT,           



                                                  Weight           

 

     topiramate            Yes                      25 mg = 1           Me

moria



     25 mg oral      3-29                               cap, PO,           l



     capsule      13:44:                               Q12H, # 60           [facility]

[place]



               00                                 cap, 0           



                                                  Refill(s),           



                                                  Pharmacy:           



                                                  [facility]           



                                                  cy #[id number],           



                                                  149.86,           



                                                  cm,            



                                                  22           



                                                  1:18:00           



                                                  CDT,           



                                                  Height,           



                                                  127.727,           



                                                  kg,            



                                                  22           



                                                  1:18:00           



                                                  CDT,           



                                                  Weight           

 

     topiramate            No                       Notes:           [facility]

ia



               3-28                               Hazardous           l



               22:05:                               [facility]rug Group           [place]                                 3:Reproduc           



                                                  tive risk           



                                                  Hazardous           



                                                  Drug --           



                                                  Refer to           



                                                  safe           



                                                  handling           



                                                  procedure           



                                                  PPE Matrix           



                                                  Sprinkle           



                                                  formulatio           



                                                  n. (Same           



                                                  As:            



                                                  Topamax)           

 

     topiramate            No                       Notes:           [facility]

ia



               3-28                               Hazardous           l



               22:05:                               Drug Group           Jose                                 3:Reproduc           



                                                  tive risk           



                                                  Hazardous           



                                                  Drug --           



                                                  Refer to           



                                                  safe           



                                                  handling           



                                                  procedure           



                                                  PPE Matrix           



                                                  Sprinkle           



                                                  formulatio           



                                                  n. (Same           



                                                  As:            



                                                  Topamax)           

 

     topiramate            No                       Notes:           [facility]

ia



               3-28                               Hazardous           l



               22:05:                               Drug Group           [place]                                 3:Reproduc           



                                                  tive risk           



                                                  Hazardous           



                                                  Drug --           



                                                  Refer to           



                                                  safe           



                                                  handling           



                                                  procedure           



                                                  PPE Matrix           



                                                  Sprinkle           



                                                  formulatio           



                                                  n. (Same           



                                                  As:            



                                                  Topamax)           

 

     topiramate            No                       Notes:           [facility]

ia



               3-28                               Hazardous           l



               22:05:                               Drug Group           Jose                                 3:Reproduc           



                                                  tive risk           



                                                  Hazardous           



                                                  Drug --           



                                                  Refer to           



                                                  safe           



                                                  handling           



                                                  procedure           



                                                  PPE Matrix           



                                                  Sprinkle           



                                                  formulatio           



                                                  n. (Same           



                                                  As:            



                                                  Topamax)           

 

     topiramate            No                       Notes:           [facility]

ia



               3-28                               Hazardous           l



               22:05:                               Drug Group           Jose                                 3:Reproduc           



                                                  tive risk           



                                                  Hazardous           



                                                  Drug --           



                                                  Refer to           



                                                  safe           



                                                  handling           



                                                  procedure           



                                                  PPE Matrix           



                                                  Sprinkle           



                                                  formulatio           



                                                  n. (Same           



                                                  As:            



                                                  Topamax)           

 

     topiramate            No                       Notes:           [facility]

ia



               3-28                               Hazardous           l



               22:05:                               Drug Group           [place]                                 3:Reproduc           



                                                  tive risk           



                                                  Hazardous           



                                                  Drug --           



                                                  Refer to           



                                                  safe           



                                                  handling           



                                                  procedure           



                                                  PPE Matrix           



                                                  Sprinkle           



                                                  formulatio           



                                                  n. (Same           



                                                  As:            



                                                  Topamax)           

 

     topiramate            No                       Notes:           [facility]

ia



               3-28                               Hazardous           l



               22:05:                               Drug Group           [place]



               00                                 3:Reproduc           



                                                  tive risk           



                                                  Hazardous           



                                                  Drug --           



                                                  Refer to           



                                                  safe           



                                                  handling           



                                                  procedure           



                                                  PPE Matrix           



                                                  Sprinkle           



                                                  formulatio           



                                                  n. (Same           



                                                  As:            



                                                  Topamax)           

 

     topiramate            No                       Notes:           [facility]

ia



               3-28                               Hazardous           l



               22:05:                               Drug Group           [place]



               00                                 3:Reproduc           



                                                  tive risk           



                                                  Hazardous           



                                                  Drug --           



                                                  Refer to           



                                                  safe           



                                                  handling           



                                                  procedure           



                                                  PPE Matrix           



                                                  Sprinkle           



                                                  formulatio           



                                                  n. (Same           



                                                  As:            



                                                  Topamax)           

 

     Yordan            No                       Notes:           [facility]



     packet      3-28                               (Same as:           l



               21:30:                               Yordan           [place]



               00                                 Unflavored           



                                                  )              



                                                  Administer           



                                                  ing YORDAN           



                                                  orally:           



                                                  Mix into           



                                                  8-10 fl oz           



                                                  of room           



                                                  temperatur           



                                                  e juice,           



                                                  soda, or           



                                                  water.           



                                                  Also mixes           



                                                  well with           



                                                  warm           



                                                  beverages,           



                                                  like           



                                                  coffee or           



                                                  tea. Can           



                                                  be mixed           



                                                  with           



                                                  applesauce           



                                                  .              



                                                  Thoroughly           



                                                  stir for           



                                                  30-60           



                                                  seconds or           



                                                  until           



                                                  completely           



                                                  dissolved           

 

     Yordan            No                       Notes:           [facility]



     packet      3-28                               (Same as:           l



               21:30:                               Yordan           [place]



               00                                 Unflavored           



                                                  )              



                                                  Administer           



                                                  ing YORDAN           



                                                  orally:           



                                                  Mix into           



                                                  8-10 fl oz           



                                                  of room           



                                                  temperatur           



                                                  e juice,           



                                                  soda, or           



                                                  water.           



                                                  Also mixes           



                                                  well with           



                                                  warm           



                                                  beverages,           



                                                  like           



                                                  coffee or           



                                                  tea. Can           



                                                  be mixed           



                                                  with           



                                                  applesauce           



                                                  .              



                                                  Thoroughly           



                                                  stir for           



                                                  30-60           



                                                  seconds or           



                                                  until           



                                                  completely           



                                                  dissolved           

 

     Yordan            No                       Notes:           [facility]



     packet      3-28                               (Same as:           l



               21:30:                               Yordan           [place]



               00                                 Unflavored           



                                                  )              



                                                  Administer           



                                                  ing YORDAN           



                                                  orally:           



                                                  Mix into           



                                                  8-10 fl oz           



                                                  of room           



                                                  temperatur           



                                                  e juice,           



                                                  soda, or           



                                                  water.           



                                                  Also mixes           



                                                  well with           



                                                  warm           



                                                  beverages,           



                                                  like           



                                                  coffee or           



                                                  tea. Can           



                                                  be mixed           



                                                  with           



                                                  applesauce           



                                                  .              



                                                  Thoroughly           



                                                  stir for           



                                                  30-60           



                                                  seconds or           



                                                  until           



                                                  completely           



                                                  dissolved           

 

     Yordan            No                       Notes:           [facility]



     packet      3-28                               (Same as:           l



               21:30:                               Yordan           [place]



               00                                 Unflavored           



                                                  )              



                                                  Administer           



                                                  ing YORDAN           



                                                  orally:           



                                                  Mix into           



                                                  8-10 fl oz           



                                                  of room           



                                                  temperatur           



                                                  e juice,           



                                                  soda, or           



                                                  water.           



                                                  Also mixes           



                                                  well with           



                                                  warm           



                                                  beverages,           



                                                  like           



                                                  coffee or           



                                                  tea. Can           



                                                  be mixed           



                                                  with           



                                                  applesauce           



                                                  .              



                                                  Thoroughly           



                                                  stir for           



                                                  30-60           



                                                  seconds or           



                                                  until           



                                                  completely           



                                                  dissolved           

 

     Yordan            No                       Notes:           [facility]



     packet      3-28                               (Same as:           l



               21:30:                               Yordan           [place]



               00                                 Unflavored           



                                                  )              



                                                  Administer           



                                                  ing YORDAN           



                                                  orally:           



                                                  Mix into           



                                                  8-10 fl oz           



                                                  of room           



                                                  temperatur           



                                                  e juice,           



                                                  soda, or           



                                                  water.           



                                                  Also mixes           



                                                  well with           



                                                  warm           



                                                  beverages,           



                                                  like           



                                                  coffee or           



                                                  tea. Can           



                                                  be mixed           



                                                  with           



                                                  applesauce           



                                                  .              



                                                  Thoroughly           



                                                  stir for           



                                                  30-60           



                                                  seconds or           



                                                  until           



                                                  completely           



                                                  dissolved           

 

     Yordan      -0      No                       Notes:           [facility]



     packet      3-28                               (Same as:           l



               21:30:                               Yordan           [place]



               00                                 Unflavored           



                                                  )              



                                                  Administer           



                                                  ing YORDAN           



                                                  orally:           



                                                  Mix into           



                                                  8-10 fl oz           



                                                  of room           



                                                  temperatur           



                                                  e juice,           



                                                  soda, or           



                                                  water.           



                                                  Also mixes           



                                                  well with           



                                                  warm           



                                                  beverages,           



                                                  like           



                                                  coffee or           



                                                  tea. Can           



                                                  be mixed           



                                                  with           



                                                  applesauce           



                                                  .              



                                                  Thoroughly           



                                                  stir for           



                                                  30-60           



                                                  seconds or           



                                                  until           



                                                  completely           



                                                  dissolved           

 

     Yordan      -0      No                       Notes:           [facility]



     packet      3-28                               (Same as:           l



               21:30:                               Yordan           [place]



               00                                 Unflavored           



                                                  )              



                                                  Administer           



                                                  ing YORDAN           



                                                  orally:           



                                                  Mix into           



                                                  8-10 fl oz           



                                                  of room           



                                                  temperatur           



                                                  e juice,           



                                                  soda, or           



                                                  water.           



                                                  Also mixes           



                                                  well with           



                                                  warm           



                                                  beverages,           



                                                  like           



                                                  coffee or           



                                                  tea. Can           



                                                  be mixed           



                                                  with           



                                                  applesauce           



                                                  .              



                                                  Thoroughly           



                                                  stir for           



                                                  30-60           



                                                  seconds or           



                                                  until           



                                                  completely           



                                                  dissolved           

 

     Yordan      -0      No                       Notes:           [facility]



     packet      3-28                               (Same as:           l



               21:30:                               Yordan           [place]



               00                                 Unflavored           



                                                  )              



                                                  Administer           



                                                  ing YORDAN           



                                                  orally:           



                                                  Mix into           



                                                  8-10 fl oz           



                                                  of room           



                                                  temperatur           



                                                  e juice,           



                                                  soda, or           



                                                  water.           



                                                  Also mixes           



                                                  well with           



                                                  warm           



                                                  beverages,           



                                                  like           



                                                  coffee or           



                                                  tea. Can           



                                                  be mixed           



                                                  with           



                                                  applesauce           



                                                  .              



                                                  Thoroughly           



                                                  stir for           



                                                  30-60           



                                                  seconds or           



                                                  until           



                                                  completely           



                                                  dissolved           

 

     Lovenox      -0      No                       Notes:           [facility]



               3-27                               (Same as:           l



               16:00:                               Lovenox)           [place]



               00                                                

 

     Lovenox      -0      No                       Notes:           [facility]



               3-27                               (Same as:           l



               16:00:                               Lovenox)           [place]



               00                                                

 

     Lovenox      -0      No                       Notes:           [facility]



               3-27                               (Same as:           l



               16:00:                               Lovenox)           [place]



               00                                                

 

     Lovenox      -0      No                       Notes:           [facility]



               3-27                               (Same as:           l



               16:00:                               Lovenox)           [place]



               00                                                

 

     Lovenox      -0      No                       Notes:           [facility]



               3-27                               (Same as:           l



               16:00:                               Lovenox)           [place]



               00                                                

 

     Lovenox            No                       Notes:           [facility]



               3-27                               (Same as:           l



               16:00:                               Lovenox)                                                           

 

     Lovenox            No                       Notes:           [facility]



               3-27                               (Same as:           l



               16:00:                               Lovenox)                                                           

 

     Lovenox            No                       Notes:           [facility]



               3-27                               (Same as:           l



               16:00:                               Lovenox)                                                           

 

     Magnesium            No                       Notes:           [facility]

a



     Sulfate      3-27                               WASTE: F/P           l



               15:54:                               - Sink; E                                            -              



                                                  [facility]           



                                                  Trash Bin           

 

     methylPREDN            No                       Notes:           [facility]

rajeev



     ISolone      3-27                               (Same           l



     SODium      15:54:                               as:Solu-ME           [facility]

nn



     SUCCinate      00                                 DROL,           



                                                  A-Methapre           



                                                  d) ***           



                                                  MEDICATION           



                                                  WASTE ***           



                                                  Product           



                                                  Size: 1000           



                                                  mg Product           



                                                  Wasted:           



                                                  ___ mg           

 

     Magnesium            No                       Notes:           [facility]

a



     Sulfate      3-27                               WASTE: F/P           l



               15:54:                               - Sink; E                                            -              



                                                  [facility]           



                                                  Trash Bin           

 

     methylPREDN            No                       Notes:           [facility]

rajeev



     ISolone      3-27                               (Same           l



     SODium      15:54:                               as:Solu-ME           [facility]

nn



     SUCCinate      00                                 DROL,           



                                                  A-Methapre           



                                                  d) ***           



                                                  MEDICATION           



                                                  WASTE ***           



                                                  Product           



                                                  Size: 1000           



                                                  mg Product           



                                                  Wasted:           



                                                  ___ mg           

 

     Magnesium            No                       Notes:           [facility]

a



     Sulfate      3-27                               WASTE: F/P           l



               15:54:                               - Sink; E                                            -              



                                                  [facility]           



                                                  Trash Bin           

 

     methylPREDN            No                       Notes:           [facility]

rajeev



     ISolone      3-27                               (Same           l



     SODium      15:54:                               as:Solu-ME           [facility]

nn



     SUCCinate      00                                 DROL,           



                                                  A-Methapre           



                                                  d) ***           



                                                  MEDICATION           



                                                  WASTE ***           



                                                  Product           



                                                  Size: 1000           



                                                  mg Product           



                                                  Wasted:           



                                                  ___ mg           

 

     Magnesium            No                       Notes:           [facility]

a



     Sulfate      3-27                               WASTE: F/P           l



               15:54:                               - Sink;                                  -              



                                                  [facility]           



                                                  Trash Bin           

 

     methylPREDN            No                       Notes:           [facility]

rajeev



     ISolone      3-27                               (Same           l



     SODium      15:54:                               as:Solu-ME           [facility]

nn



     SUCCinate      00                                 DROL,           



                                                  A-Methapre           



                                                  d) ***           



                                                  MEDICATION           



                                                  WASTE ***           



                                                  Product           



                                                  Size: 1000           



                                                  mg Product           



                                                  Wasted:           



                                                  ___ mg           

 

     Magnesium            No                       Notes:           [facility]

a



     Sulfate      3-27                               WASTE: F/P           l



               15:54:                               - Sink;                                  -              



                                                  [facility]           



                                                  Trash Bin           

 

     methylPREDN            No                       Notes:           [facility]

rajeev



     ISolone      3-27                               (Same           l



     SODium      15:54:                               as:Solu-ME           [facility]

nn



     SUCCinate      00                                 DROL,           



                                                  A-Methapre           



                                                  d) ***           



                                                  MEDICATION           



                                                  WASTE ***           



                                                  Product           



                                                  Size: 1000           



                                                  mg Product           



                                                  Wasted:           



                                                  ___ mg           

 

     Magnesium            No                       Notes:           [facility]

a



     Sulfate      3-27                               WASTE: F/P           l



               15:54:                               - Sink;                                  -              



                                                  [facility]           



                                                  Trash Bin           

 

     methylPREDN            No                       Notes:           [facility]

rajeev



     ISolone      3-27                               (Same           l



     SODium      15:54:                               as:Solu-ME           [facility]

nn



     SUCCinate      00                                 DROL,           



                                                  A-Methapre           



                                                  d) ***           



                                                  MEDICATION           



                                                  WASTE ***           



                                                  Product           



                                                  Size: 1000           



                                                  mg Product           



                                                  Wasted:           



                                                  ___ mg           

 

     Magnesium            No                       Notes:           [facility]

a



     Sulfate      3-27                               WASTE: F/P           l



               15:54:                               - Sink;                                  -              



                                                  [facility]           



                                                  Trash Bin           

 

     methylPREDN            No                       Notes:           [facility]

ra[facility]ev



     ISolone      3-27                               (Same           l



     SODium      15:54:                               as:Solu-ME           [facility]

nn



     SUCCinate      00                                 DROL,           



                                                  A-Methapre           



                                                  d) ***           



                                                  MEDICATION           



                                                  WASTE ***           



                                                  Product           



                                                  Size: 1000           



                                                  mg Product           



                                                  Wasted:           



                                                  ___ mg           

 

     Magnesium            No                       Notes:           [facility]

a



     Sulfate      3-27                               WASTE: F/P           l



               15:54:                               - Sink;                                  -              



                                                  [facility]           



                                                  Trash Bin           

 

     methylPREDN            No                       Notes:           [facility]

rajeev



     ISolone      3-27                               (Same           l



     SODium      15:54:                               as:Solu-ME           [facility]

nn



     SUCCinate      00                                 DROL,           



                                                  A-Methapre           



                                                  d) ***           



                                                  MEDICATION           



                                                  WASTE ***           



                                                  Product           



                                                  Size: 1000           



                                                  mg Product           



                                                  Wasted:           



                                                  ___ mg           

 

     Dilaudid            No                       Notes:           [facility]



               3-27                               (Same as:           l



               13:28:                               Dilaudid)                                                           

 

     Dilaudid            No                       Notes:           [facility]



               3-27                               (Same as:           l



               13:28:                               Dilaudid)                                                           

 

     Dilaudid            No                       Notes:           [facility]



               3-27                               (Same as:           l



               13:28:                               Dilaudid)           [place]



               00                                                

 

     Dilaudid            No                       Notes:           [facility]



               3-27                               (Same as:           l



               13:28:                               Dilaudid)           [place]



               00                                                

 

     Dilaudid            No                       Notes:           [facility]



               3-27                               (Same as:           l



               13:28:                               Dilaudid)           [place]



               00                                                

 

     Dilaudid            No                       Notes:           [facility]



               3-27                               (Same as:           l



               13:28:                               Dilaudid)           [place]



               00                                                

 

     Dilaudid            No                       Notes:           [facility]



               3-27                               (Same as:           l



               13:28:                               Dilaudid)           [place]



               00                                                

 

     Dilaudid            No                       Notes:           [facility]



               3-27                               (Same as:           l



               13:28:                               Dilaudid)           [place]



               00                                                

 

     remove            No                       Notes:           [facility]



     patch      3-27                               Remove           l



               02:00:                               patch 12           [place]



               00                                 hours           



                                                  after           



                                                  applicatio           



                                                  n each           



                                                  day.           

 

     remove            No                       Notes:           [facility]



     patch      3-27                               Remove           l



               02:00:                               patch 12           [place]



               00                                 hours           



                                                  after           



                                                  applicatio           



                                                  n each           



                                                  day.           

 

     remove            No                       Notes:           [facility]



     patch      3-27                               Remove           l



               02:00:                               patch 12           [place]



               00                                 hours           



                                                  after           



                                                  applicatio           



                                                  n each           



                                                  day.           

 

     remove            No                       Notes:           [facility]



     patch      3-27                               Remove           l



               02:00:                               patch 12           [place]



               00                                 hours           



                                                  after           



                                                  applicatio           



                                                  n each           



                                                  day.           

 

     remove            No                       Notes:           [facility]



     patch      3-27                               Remove           l



               02:00:                               patch 12           [place]



               00                                 hours           



                                                  after           



                                                  applicatio           



                                                  n each           



                                                  day.           

 

     remove            No                       Notes:           [facility]



     patch      3-27                               Remove           l



               02:00:                               patch 12           [place]



               00                                 hours           



                                                  after           



                                                  applicatio           



                                                  n each           



                                                  day.           

 

     remove            No                       Notes:           [facility]



     patch      3-27                               Remove           l



               02:00:                               patch 12           [place]



               00                                 hours           



                                                  after           



                                                  applicatio           



                                                  n each           



                                                  day.           

 

     remove            No                       Notes:           [facility]



     patch      3-27                               Remove           l



               02:00:                               patch 12           [place]



               00                                 hours           



                                                  after           



                                                  applicatio           



                                                  n each           



                                                  day.           

 

     Lyrica            No                       Notes:           [facility]



               3-26                               (Same as:           l



               21:58:                               Lyrica)           [place]



               00                                                

 

     Naproxen            No                       Notes:           [facility]



               3-26                               (Same as:           l



               21:58:                               Naprosyn)           [place]



               00                                 Take with           



                                                  food.           

 

     Lyrica      0      No                       Notes:           [facility]



               3-26                               (Same as:           l



               21:58:                               Lyrica)           [place]



               00                                                

 

     Naproxen      0      No                       Notes:           [facility]



               3-26                               (Same as:           l



               21:58:                               Naprosyn)           [place]



               00                                 Take with           



                                                  food.           

 

     Lyrica      0      No                       Notes:           [facility]



               3-26                               (Same as:           l



               21:58:                               Lyrica)           [place]



               00                                                

 

     Naproxen      0      No                       Notes:           [facility]



               3-26                               (Same as:           l



               21:58:                               Naprosyn)           [place]



               00                                 Take with           



                                                  food.           

 

     Lyrica      0      No                       Notes:           [facility]



               3-26                               (Same as:           l



               21:58:                               Lyrica)           [place]



               00                                                

 

     Naproxen            No                       Notes:           [facility]



               3-26                               (Same as:           l



               21:58:                               Naprosyn)           [place]



               00                                 Take with           



                                                  food.           

 

     Lyrica            No                       Notes:           [facility]



               3-26                               (Same as:           l



               21:58:                               Lyrica)           [place]



               00                                                

 

     Naproxen            No                       Notes:           [facility]



               3-26                               (Same as:           l



               21:58:                               Naprosyn)           [place]



               00                                 Take with           



                                                  food.           

 

     Lyrica            No                       Notes:           [facility]



               3-26                               (Same as:           l



               21:58:                               Lyrica)           [place]



               00                                                

 

     Naproxen            No                       Notes:           [facility]



               3-26                               (Same as:           l



               21:58:                               Naprosyn)           [place]



               00                                 Take with           



                                                  food.           

 

     Lyrica            No                       Notes:           [facility]



               3-26                               (Same as:           l



               21:58:                               Lyrica)           [place]



               00                                                

 

     Naproxen      0      No                       Notes:           [facility]



               3-26                               (Same as:           l



               21:58:                               Naprosyn)           [place]



               00                                 Take with           



                                                  food.           

 

     Lyrica      0      No                       Notes:           [facility]



               3-26                               (Same as:           l



               21:58:                               Lyrica)           [place]



               00                                                

 

     Naproxen      0      No                       Notes:           [facility]



               3-26                               (Same as:           l



               21:58:                               Naprosyn)           [place]



               00                                 Take with           



                                                  food.           

 

     Ascorbic            No                       Notes:           [facility]



     Acid      3-26                               (Same as:           l



               14:00:                               Vitamin C)           [place]                                                

 

     Zinc            No                       Notes:           [facility]



     Sulfate      3-26                               (Zinc           l



               14:00:                               sulfate           [place]



               00                                 capsule) -           



                                                  220 mg           



                                                  Zinc           



                                                  sulfate =           



                                                  50 mg           



                                                  elemental           



                                                  zinc Same           



                                                  as Zinc           



                                                  Sulfate           

 

     multivitami            No                       Notes:           [facility]

[facility]



     n         3-26                               (Same           l



               14:00:                               as:[facility]ra)                                            WASTE: F/P           



                                                  - Black; E           



                                                  -              



                                                  Municipal           



                                                  Trash Bin           



                                                  Take with           



                                                  food.           

 

     Lidocaine            No                       Notes:           [facility]

a



     0.05 MG/MG      3-26                               Apply only           l



     Transdermal      14:00:                               once for           [facility]

[facility]



     Patch      00                                 up to 12           



                                                  hours in a           



                                                  24-hour           



                                                  period (12           



                                                  hours on           



                                                  and 12           



                                                  hours           



                                                  off).           



                                                  (Same as:           



                                                  Aspercreme           



                                                  Lidocaine           



                                                  Patch)           



                                                  "Remove           



                                                  old patch           



                                                  before           



                                                  applicatio           



                                                  n of new           



                                                  patch"           

 

     Ascorbic            No                       Notes:           [facility]



     Acid      3-26                               (Same as:           l



               14:00:                               Vitamin C)                                                           

 

     Zinc            No                       Notes:           [facility]



     Sulfate      3-26                               (Zinc           l



               14:00:                               sulfate           [place]



               00                                 capsule) -           



                                                  220 mg           



                                                  Zinc           



                                                  sulfate =           



                                                  50 mg           



                                                  elemental           



                                                  zinc Same           



                                                  as Zinc           



                                                  Sulfate           

 

     multivitami            No                       Notes:           [facility]

[facility]



     n         3-26                               (Same           l



               14:00:                               as:Thera)                                            WASTE: F/P           



                                                  - Black; E           



                                                  -              



                                                  Municipal           



                                                  Trash Bin           



                                                  Take with           



                                                  food.           

 

     Lidocaine            No                       Notes:           [facility]

a



     0.05 MG/MG      3-26                               Apply only           l



     Transdermal      14:00:                               once for           [facility]

[facility]



     Patch      00                                 up to 12           



                                                  hours in a           



                                                  24-hour           



                                                  period (12           



                                                  hours on           



                                                  and 12           



                                                  hours           



                                                  off).           



                                                  (Same as:           



                                                  Aspercreme           



                                                  Lidocaine           



                                                  Patch)           



                                                  "Remove           



                                                  old patch           



                                                  before           



                                                  applicatio           



                                                  n of new           



                                                  patch"           

 

     Ascorbic            No                       Notes:           [facility]



     Acid      3-26                               (Same as:           l



               14:00:                               Vitamin C)                                                           

 

     Zinc            No                       Notes:           [facility]



     Sulfate      3-26                               (Zinc           l



               14:00:                               sulfate           [place]



               00                                 capsule) -           



                                                  220 mg           



                                                  Zinc           



                                                  sulfate =           



                                                  50 mg           



                                                  elemental           



                                                  zinc Same           



                                                  as Zinc           



                                                  Sulfate           

 

     multivitami            No                       Notes:           [facility]

[facility]



     n         3-26                               (Same           l



               14:00:                               as:Thera)                                            WASTE: F/P           



                                                  - Black; E           



                                                  -              



                                                  Municipal           



                                                  Trash Bin           



                                                  Take with           



                                                  food.           

 

     Lidocaine            No                       Notes:           [facility]

a



     0.05 MG/MG      3-26                               Apply only           l



     Transdermal      14:00:                               once for           [facility]

[facility]



     Patch      00                                 up to 12           



                                                  hours in a           



                                                  24-hour           



                                                  period (12           



                                                  hours on           



                                                  and 12           



                                                  hours           



                                                  off).           



                                                  (Same as:           



                                                  Aspercreme           



                                                  Lidocaine           



                                                  Patch)           



                                                  "Remove           



                                                  old patch           



                                                  before           



                                                  applicatio           



                                                  n of new           



                                                  patch"           

 

     Ascorbic            No                       Notes:           [facility]



     Acid      3-26                               (Same as:           l



               14:00:                               Vitamin C)           [place]



                                                               

 

     Zinc            No                       Notes:           [facility]



     Sulfate      3-26                               (Zinc           l



               14:00:                               sulfate           [place]



               00                                 capsule) -           



                                                  220 mg           



                                                  Zinc           



                                                  sulfate =           



                                                  50 mg           



                                                  elemental           



                                                  zinc Same           



                                                  as Zinc           



                                                  Sulfate           

 

     multivitami            No                       Notes:           [facility]

rajeev



     n         3-26                               (Same           l



               14:00:                               as:Thera)                                            WASTE: F/P           



                                                  - Black; E           



                                                  -              



                                                  Municipal           



                                                  Trash Bin           



                                                  Take with           



                                                  food.           

 

     Lidocaine            No                       Notes:           [facility]

a



     0.05 MG/MG      3-26                               Apply only           l



     Transdermal      14:00:                               once for           He

rmann



     Patch      00                                 up to 12           



                                                  hours in a           



                                                  24-hour           



                                                  period (12           



                                                  hours on           



                                                  and 12           



                                                  hours           



                                                  off).           



                                                  (Same as:           



                                                  Aspercreme           



                                                  Lidocaine           



                                                  Patch)           



                                                  "Remove           



                                                  old patch           



                                                  before           



                                                  applicatio           



                                                  n of new           



                                                  patch"           

 

     Ascorbic            No                       Notes:           [facility]



     Acid      3-26                               (Same as:           l



               14:00:                               Vitamin C)                                                           

 

     Zinc            No                       Notes:           [facility]



     Sulfate      3-26                               (Zinc           l



               14:00:                               sulfate           [facility]



               00                                 capsule) -           



                                                  220 mg           



                                                  Zinc           



                                                  sulfate =           



                                                  50 mg           



                                                  elemental           



                                                  zinc Same           



                                                  as Zinc           



                                                  Sulfate           

 

     multivitami            No                       Notes:           [facility]

rajeev



     n         3-26                               (Same           l



               14:00:                               as:Thera)                                            WASTE: F/P           



                                                  - Black; E           



                                                  -              



                                                  Municipal           



                                                  Trash Bin           



                                                  Take with           



                                                  food.           

 

     Lidocaine            No                       Notes:           [facility]

a



     0.05 MG/MG      3-26                               Apply only           l



     Transdermal      14:00:                               once for           He

rmann



     Patch      00                                 up to 12           



                                                  hours in a           



                                                  24-hour           



                                                  period (12           



                                                  hours on           



                                                  and 12           



                                                  hours           



                                                  off).           



                                                  (Same as:           



                                                  Aspercreme           



                                                  Lidocaine           



                                                  Patch)           



                                                  "Remove           



                                                  old patch           



                                                  before           



                                                  applicatio           



                                                  n of new           



                                                  patch"           

 

     Ascorbic            No                       Notes:           [facility]



     Acid      3-26                               (Same as:           l



               14:00:                               Vitamin C)           [facility]



                                                               

 

     Zinc            No                       Notes:           [facility]



     Sulfate      3-26                               (Zinc           l



               14:00:                               sulfate           [place]



               00                                 capsule) -           



                                                  220 mg           



                                                  Zinc           



                                                  sulfate =           



                                                  50 mg           



                                                  elemental           



                                                  zinc Same           



                                                  as Zinc           



                                                  Sulfate           

 

     multivitami            No                       Notes:           [facility]

rajeev



     n         3-26                               (Same           l



               14:00:                               as:Thera)                                            WASTE: F/P           



                                                  - Black; E           



                                                  -              



                                                  Municipal           



                                                  Trash Bin           



                                                  Take with           



                                                  food.           

 

     Lidocaine            No                       Notes:           [facility]

a



     0.05 MG/MG      3-26                               Apply only           l



     Transdermal      14:00:                               once for           He

rmann



     Patch      00                                 up to 12           



                                                  hours in a           



                                                  24-hour           



                                                  period (12           



                                                  hours on           



                                                  and 12           



                                                  hours           



                                                  off).           



                                                  (Same as:           



                                                  Aspercreme           



                                                  Lidocaine           



                                                  Patch)           



                                                  "Remove           



                                                  old patch           



                                                  before           



                                                  applicatio           



                                                  n of new           



                                                  patch"           

 

     Ascorbic            No                       Notes:           [facility]



     Acid      3-26                               (Same as:           l



               14:00:                               Vitamin C)                                                           

 

     Zinc            No                       Notes:           [facility]



     Sulfate      3-26                               (Zinc           l



               14:00:                               sulfate           [facility]



               00                                 capsule) -           



                                                  220 mg           



                                                  Zinc           



                                                  sulfate =           



                                                  50 mg           



                                                  elemental           



                                                  zinc Same           



                                                  as Zinc           



                                                  Sulfate           

 

     multivitami            No                       Notes:           [facility]

rajeev



     n         3-26                               (Same           l



               14:00:                               as:Thera)                                            WASTE: F/P           



                                                  - Black; E           



                                                  -              



                                                  Municipal           



                                                  Trash Bin           



                                                  Take with           



                                                  food.           

 

     Lidocaine            No                       Notes:           [facility]

a



     0.05 MG/MG      3-26                               Apply only           l



     Transdermal      14:00:                               once for           He

rmann



     Patch      00                                 up to 12           



                                                  hours in a           



                                                  24-hour           



                                                  period (12           



                                                  hours on           



                                                  and 12           



                                                  hours           



                                                  off).           



                                                  (Same as:           



                                                  Aspercreme           



                                                  Lidocaine           



                                                  Patch)           



                                                  "Remove           



                                                  old patch           



                                                  before           



                                                  applicatio           



                                                  n of new           



                                                  patch"           

 

     Ascorbic            No                       Notes:           [facility]



     Acid      3-26                               (Same as:           l



               14:00:                               Vitamin C)                                                           

 

     Zinc            No                       Notes:           [facility]



     Sulfate      3-26                               (Zinc           l



               14:00:                               sulfate           [place]



               00                                 capsule) -           



                                                  220 mg           



                                                  Zinc           



                                                  sulfate =           



                                                  50 mg           



                                                  elemental           



                                                  zinc Same           



                                                  as Zinc           



                                                  Sulfate           

 

     multivitami            No                       Notes:           [facility]

rajeev



     n         3-26                               (Same           l



               14:00:                               as:Thera)                                            WASTE: F/P           



                                                  - Black; E           



                                                  -              



                                                  Municipal           



                                                  Trash Bin           



                                                  Take with           



                                                  food.           

 

     Lidocaine            No                       Notes:           [facility]

a



     0.05 MG/MG      3-                               Apply only           l



     Transdermal      14:00:                               once for           He

rmann



     Patch      00                                 up to 12           



                                                  hours in a           



                                                  24-hour           



                                                  period (12           



                                                  hours on           



                                                  and 12           



                                                  hours           



                                                  off).           



                                                  (Same as:           



                                                  Aspercreme           



                                                  Lidocaine           



                                                  Patch)           



                                                  "Remove           



                                                  old patch           



                                                  before           



                                                  applicatio           



                                                  n of new           



                                                  patch"           

 

     Celebrex            No                       Notes:           [facility]



               3-26                               NSAID.           l



               03:15:                               Please           Jose



               00                                 check           



                                                  indication           



                                                  . Not for           



                                                  seizure.           



                                                  (Same As:           



                                                  CeleBREX)           

 

     Robaxin            No                       Notes:           [facility]



               3-26                               (Same           l



               03:15:                               as:Robaxin           [place]



               00                                 )              

 

     gabapentin            No                       Notes:           [facility]

ia



               3-26                               (Same as:           l



               03:15:                               Neurontin)           [place]



               00                                                

 

     Tramadol            No                       Notes: Not           [facility]

[facility]



               3-26                               to exceed           l



               03:15:                               400mg/day.           [place]



               00                                 (Same As:           



                                                  Ultram)           

 

     Celebrex            No                       Notes:           [facility]



               3-26                               NSAID.           l



               03:15:                               Please           [place]



               00                                 check           



                                                  indication           



                                                  . Not for           



                                                  seizure.           



                                                  (Same As:           



                                                  CeleBREX)           

 

     Robaxin            No                       Notes:           [facility]



               3-26                               (Same           l



               03:15:                               as:Robaxin           [place]



               00                                 )              

 

     gabapentin            No                       Notes:           [facility]

ia



               3-26                               (Same as:           l



               03:15:                               Neurontin)           [facility]



               00                                                

 

     Tramadol      0      No                       Notes: Not           [facility]

[facility]



               3-26                               to exceed           l



               03:15:                               400mg/day.           [facility]



               00                                 (Same As:           



                                                  Ultram)           

 

     Celebrex            No                       Notes:           [facility]



               3-26                               NSAID.           l



               03:15:                               Please           [facility]



               00                                 check           



                                                  indication           



                                                  . Not for           



                                                  seizure.           



                                                  (Same As:           



                                                  CeleBREX)           

 

     Robaxin      0      No                       Notes:           [facility]



               3-26                               (Same           l



               03:15:                               as:Robaxin           [facility]



               00                                 )              

 

     gabapentin      0      No                       Notes:           [facility]

ia



               3-26                               (Same as:           l



               03:15:                               Neurontin)           [facility]



               00                                                

 

     Tramadol      0      No                       Notes: Not           Mem

[facility]



               3-26                               to exceed           l



               03:15:                               400mg/day.           [facility]



               00                                 (Same As:           



                                                  Ultram)           

 

     Celebrex      0      No                       Notes:           [facility]



               3-26                               NSAID.           l



               03:15:                               Please           [place]



               00                                 check           



                                                  indication           



                                                  . Not for           



                                                  seizure.           



                                                  (Same As:           



                                                  CeleBREX)           

 

     Robaxin      0      No                       Notes:           [facility]



               3-26                               (Same           l



               03:15:                               as:Robaxin           [facility]



               00                                 )              

 

     gabapentin      0      No                       Notes:           [facility]

ia



               3-26                               (Same as:           l



               03:15:                               Neurontin)           [place]



               00                                                

 

     Tramadol      0      No                       Notes: Not           Mem

[facility]



               3-26                               to exceed           l



               03:15:                               400mg/day.           [place]



               00                                 (Same As:           



                                                  Ultram)           

 

     Celebrex      0      No                       Notes:           [facility]



               3-26                               NSAID.           l



               03:15:                               Please           Jose



               00                                 check           



                                                  indication           



                                                  . Not for           



                                                  seizure.           



                                                  (Same As:           



                                                  CeleBREX)           

 

     Robaxin      0      No                       Notes:           [facility]



               3-26                               (Same           l



               03:15:                               as:Robaxin           [facility]



               00                                 )              

 

     gabapentin      0      No                       Notes:           [facility]

ia



               3-26                               (Same as:           l



               03:15:                               Neurontin)           [place]



               00                                                

 

     Tramadol      0      No                       Notes: Not           Mem

[facility]



               3-26                               to exceed           l



               03:15:                               400mg/day.           [facility]



               00                                 (Same As:           



                                                  Ultram)           

 

     Celebrex      0      No                       Notes:           [facility]



               3-26                               NSAID.           l



               03:15:                               Please           [place]



               00                                 check           



                                                  indication           



                                                  . Not for           



                                                  seizure.           



                                                  (Same As:           



                                                  CeleBREX)           

 

     Robaxin      0      No                       Notes:           [facility]



               3-26                               (Same           l



               03:15:                               as:Robaxin           [place]



               00                                 )              

 

     gabapentin      0      No                       Notes:           [facility]

[facility]



               3-26                               (Same as:           l



               03:15:                               Neurontin)           [place]



               00                                                

 

     Tramadol      0      No                       Notes: Not           [facility]

[facility]



               3-26                               to exceed           l



               03:15:                               400mg/day.           [place]



               00                                 (Same As:           



                                                  Ultram)           

 

     Celebrex      0      No                       Notes:           [facility]



               3-26                               NSAID.           l



               03:15:                               Please           [place]



               00                                 check           



                                                  indication           



                                                  . Not for           



                                                  seizure.           



                                                  (Same As:           



                                                  CeleBREX)           

 

     Robaxin      0      No                       Notes:           [facility]



               3-26                               (Same           l



               03:15:                               as:Robaxin           [place]



               00                                 )              

 

     gabapentin      0      No                       Notes:           [facility]

[facility]



               3-26                               (Same as:           l



               03:15:                               Neurontin)           [place]



               00                                                

 

     Tramadol      -0      No                       Notes: Not           [facility]

[facility]



               3-26                               to exceed           l



               03:15:                               400mg/day.           [facility]



               00                                 (Same As:           



                                                  Ultram)           

 

     Celebrex            No                       Notes:           [facility]



               3-                               NSAID.           l



               03:15:                               Please                                            check           



                                                  indication           



                                                  . Not for           



                                                  seizure.           



                                                  (Same As:           



                                                  CeleBREX)           

 

     Robaxin            No                       Notes:           [facility]



               3-                               (Same           l



               03:15:                               as:Robaxin                                            )              

 

     gabapentin            No                       Notes:           [facility]

[facility]



               3-26                               (Same as:           l



               03:15:                               Neurontin)           [place]                                                

 

     Tramadol            No                       Notes: Not           [facility]

[facility]



               3-26                               to exceed           l



               03:15:                               400mg/day.           [facility]                                 (Same As:           



                                                  Ultram)           

 

     Dexamethaso            No                       Notes: ***           

[facility]



     ne        3-26                               MEDICATION           l



               03:00:                               WASTE ***           [facility]



                                                Product           



                                                  Size: 10           



                                                  mg Product           



                                                  Wasted:           



                                                  ___ mg           

 

     Dexamethaso      -0      No                       Notes: ***           

[facility]



     ne        3-26                               MEDICATION           l



               03:00:                               WASTE ***           [facility]



                                                Product           



                                                  Size: 10           



                                                  mg Product           



                                                  Wasted:           



                                                  ___ mg           

 

     Dexamethaso      -0      No                       Notes: ***           

[facility]



     ne        3-26                               MEDICATION           l



               03:00:                               WASTE ***           [facility]



                                                Product           



                                                  Size: 10           



                                                  mg Product           



                                                  Wasted:           



                                                  ___ mg           

 

     Dexamethaso      -0      No                       Notes: ***           

[facility]



     ne        3-26                               MEDICATION           l



               03:00:                               WASTE ***           [facility]



                                                Product           



                                                  Size: 10           



                                                  mg Product           



                                                  Wasted:           



                                                  ___ mg           

 

     Dexamethaso      -0      No                       Notes: ***           

[facility]



     ne        3-26                               MEDICATION           l



               03:00:                               WASTE ***           [facility]



                                                Product           



                                                  Size: 10           



                                                  mg Product           



                                                  Wasted:           



                                                  ___ mg           

 

     Dexamethaso      -0      No                       Notes: ***           

[facility]



     ne        3-26                               MEDICATION           l



               03:00:                               WASTE ***           [facility]



                                                Product           



                                                  Size: 10           



                                                  mg Product           



                                                  Wasted:           



                                                  ___ mg           

 

     Dexamethaso      -0      No                       Notes: ***           

[facility]



     ne        3-26                               MEDICATION           l



               03:00:                               WASTE ***           [facility]



                                                Product           



                                                  Size: 10           



                                                  mg Product           



                                                  Wasted:           



                                                  ___ mg           

 

     Dexamethaso      -0      No                       Notes: ***           

[facility]



     ne        3-26                               MEDICATION           l



               03:00:                               WASTE ***           [facility]



               00                                 Product           



                                                  Size: 10           



                                                  mg Product           



                                                  Wasted:           



                                                  ___ mg           

 

     Dilaudid            No                       Notes:           [facility]



               3                               Same as           l



               02:58:                               Dilaudid           [place]



               00                                                

 

     Dilaudid            No                       Notes:           [facility]



               3                               Same as           l



               02:58:                               Dilaudid           [place]



               00                                                

 

     Dilaudid            No                       Notes:           [facility]



               3                               Same as           l



               02:58:                               Dilaudid           [place]



               00                                                

 

     Dilaudid            No                       Notes:           [facility]



               3                               Same as           l



               02:58:                               Dilaudid           [place]



               00                                                

 

     Dilaudid            No                       Notes:           [facility]



               3                               Same as           l



               02:58:                               Dilaudid           [place]



               00                                                

 

     Dilaudid            No                       Notes:           [facility]



               3-26                               Same as           l



               02:58:                               Dilaudid           [place]



               00                                                

 

     Dilaudid            No                       Notes:           [facility]



               3                               Same as           l



               02:58:                               Dilaudid           [place]



               00                                                

 

     Dilaudid            No                       Notes:           [facility]



               3                               Same as           l



               02:58:                               Dilaudid           [place]



               00                                                

 

     Acetaminoph            No                       Notes: Max           

[facility]



     en        3-25                               acetaminop           l



               22:38:                               hen =           [place]



               00                                 4000mg/day           



                                                  (4             



                                                  gm/day).           



                                                  (Same as:           



                                                  Tylenol)           

 

     Acetaminoph            No                       Notes: Max           

[facility]



     en        3-25                               acetaminop           l



               22:38:                               hen =           [place]



               00                                 4000mg/day           



                                                  (4             



                                                  gm/day).           



                                                  (Same as:           



                                                  Tylenol)           

 

     Acetaminoph            No                       Notes: Max           

[facility]



     en        3-25                               acetaminop           l



               22:38:                               hen =           [place]



               00                                 4000mg/day           



                                                  (4             



                                                  gm/day).           



                                                  (Same as:           



                                                  Tylenol)           

 

     Acetaminoph      0      No                       Notes: Max           

[facility]



     en        3-25                               acetaminop           l



               22:38:                               hen =           [place]



               00                                 4000mg/day           



                                                  (4             



                                                  gm/day).           



                                                  (Same as:           



                                                  Tylenol)           

 

     Acetaminoph            No                       Notes: Max           

[facility]



     en        3-25                               acetaminop           l



               22:38:                               hen =           [place]



               00                                 4000mg/day           



                                                  (4             



                                                  gm/day).           



                                                  (Same as:           



                                                  Tylenol)           

 

     Acetaminoph      2022-0      No                       Notes: Max           

[facility]



     en        3-25                               acetaminop           l



               22:38:                               hen =           [place]



               00                                 4000mg/day           



                                                  (4             



                                                  gm/day).           



                                                  (Same as:           



                                                  Tylenol)           

 

     Acetaminoph            No                       Notes: Max           

[facility]



     en        3-25                               acetaminop           l



               22:38:                               hen =           [place]



               00                                 4000mg/day           



                                                  (4             



                                                  gm/day).           



                                                  (Same as:           



                                                  Tylenol)           

 

     Acetaminoph            No                       Notes: Max           

[facility]



     en        3-25                               acetaminop           l



               22:38:                               hen =           [place]



               00                                 4000mg/day           



                                                  (4             



                                                  gm/day).           



                                                  (Same as:           



                                                  Tylenol)           

 

     Isolyte S            No                       Notes:           [facility]

a



     PH-7.4      3-25                               (Same as:           l



     (Bolus) IV      22:37:                               Isolyte S           [facility]

[facility]



               00                                 PH7.4,           



                                                  Normosol-R           



                                                  PH 7.4,           



                                                  Plasma-Lyt           



                                                  e A )           

 

     Magnesium            No                       Notes:           [facility]

a



     Sulfate      3-25                               WASTE: F/P           l



               22:37:                               - Sink; E           [facility]



                                                -              



                                                  [facility]unicipal           



                                                  Trash Bin           

 

     Isolyte S            No                       Notes:           [facility]

a



     PH-7.4      3-25                               (Same as:           l



     (Bolus) IV      22:37:                               Isolyte S           [facility]

[facility]



               00                                 PH7.4,           



                                                  Normosol-R           



                                                  PH 7.4,           



                                                  Plasma-Lyt           



                                                  e A )           

 

     Magnesium            No                       Notes:           [facility]

a



     Sulfate      3-25                               WASTE: F/P           l



               22:37:                               - Sink; E           [facility]



                                                -              



                                                  Municipal           



                                                  Trash Bin           

 

     Isolyte S            No                       Notes:           [facility]

a



     PH-7.4      3-25                               (Same as:           l



     (Bolus) IV      22:37:                               Isolyte S           [facility]

[facility]



               00                                 PH7.4,           



                                                  Normosol-R           



                                                  PH 7.4,           



                                                  Plasma-Lyt           



                                                  e A )           

 

     Magnesium            No                       Notes:           [facility]

a



     Sulfate      3-25                               WASTE: F/P           l



               22:37:                               - Sink; E           [place]



                                                -              



                                                  Municipal           



                                                  Trash Bin           

 

     Isolyte S            No                       Notes:           [facility]

a



     PH-7.4      3-25                               (Same as:           l



     (Bolus) IV      22:37:                               Isolyte S           [facility]

[facility]



               00                                 PH7.4,           



                                                  Normosol-R           



                                                  PH 7.4,           



                                                  Plasma-Lyt           



                                                  e A )           

 

     Magnesium            No                       Notes:           [facility]

a



     Sulfate      3-25                               WASTE: F/P           l



               22:37:                               - Sink; E           [place]



                                                -              



                                                  Municipal           



                                                  Trash Bin           

 

     Isolyte S            No                       Notes:           [facility]

a



     PH-7.4      3-25                               (Same as:           l



     (Bolus) IV      22:37:                               Isolyte S           [facility]

[facility]



               00                                 PH7.4,           



                                                  Normosol-R           



                                                  PH 7.4,           



                                                  Plasma-Lyt           



                                                  e A )           

 

     Magnesium            No                       Notes:           [facility]

a



     Sulfate      3-25                               WASTE: F/P           l



               22:37:                               - Sink; E           [facility]



                                                -              



                                                  Municipal           



                                                  Trash Bin           

 

     Isolyte S            No                       Notes:           [facility]

a



     PH-7.4      3-25                               (Same as:           l



     (Bolus) IV      22:37:                               Isolyte S           [facility]

[facility]



               00                                 PH7.4,           



                                                  Normosol-R           



                                                  PH 7.4,           



                                                  Plasma-Lyt           



                                                  e A )           

 

     Magnesium            No                       Notes:           [facility]

a



     Sulfate      3-25                               WASTE: F/P           l



               22:37:                               - Sink; E           [place]



                                                -              



                                                  Municipal           



                                                  Trash Bin           

 

     Isolyte S            No                       Notes:           [facility]

a



     PH-7.4      3-25                               (Same as:           l



     (Bolus) IV      22:37:                               Isolyte S           [facility]

[facility]



               00                                 PH7.4,           



                                                  Normosol-R           



                                                  PH 7.4,           



                                                  Plasma-Lyt           



                                                  e A )           

 

     Magnesium            No                       Notes:           [facility]

a



     Sulfate      3-25                               WASTE: F/P           l



               22:37:                               - Sink; E           [place]                                 -              



                                                  [facility]           



                                                  Trash Bin           

 

     Isolyte S            No                       Notes:           [facility]

a



     PH-7.4      3-25                               (Same as:           l



     (Bolus) IV      22:37:                               Isolyte S           He

rmann



               00                                 PH7.4,           



                                                  Normosol-R           



                                                  PH 7.4,           



                                                  Plasma-Lyt           



                                                  e A )           

 

     Magnesium            No                       Notes:           [facility]

a



     Sulfate      3-25                               WASTE: F/P           l



               22:37:                               - Sink;            [place]                                 -              



                                                  [facility]           



                                                  Trash Bin           

 

     Iohexol            No                       100 mL,           [facility]



               3-25                               Route:           l



               20:04:                               IVP, Drug                                            Form:           



                                                  SOLN,           



                                                  Dosing           



                                                  Weight           



                                                  127.727,           



                                                  kg,            



                                                  ONCALL,           



                                                  STAT,           



                                                  Start           



                                                  date:           



                                                  22           



                                                  15:04:00           



                                                  CDT,           



                                                  Duration:           



                                                  1 doses or           



                                                  times,           



                                                  Dose =           



                                                  2.2ml/kg,           



                                                  Max dose =           



                                                  100ml --           



                                                  "To be           



                                                  infused by           



                                                  Radiology           



                                                  Staff           



                                                  ONLY"           

 

     Iohexol      2-0      No                       100 mL,           [facility]



               3-25                               Route:           l



               20:04:                               IVP, Drug           [facility]



               00                                 Form:           



                                                  SOLN,           



                                                  Dosing           



                                                  Weight           



                                                  127.727,           



                                                  kg,            



                                                  ONCALL,           



                                                  STAT,           



                                                  Start           



                                                  date:           



                                                  22           



                                                  15:04:00           



                                                  CDT,           



                                                  Duration:           



                                                  1 doses or           



                                                  times,           



                                                  Dose =           



                                                  2.2ml/kg,           



                                                  Max dose =           



                                                  100ml --           



                                                  "To be           



                                                  infused by           



                                                  Radiology           



                                                  Staff           



                                                  ONLY"           

 

     Iohexol      2-0      No                       100 mL,           [facility]



               3-25                               Route:           l



               20:04:                               IVP, Drug           [facility]



               00                                 Form:           



                                                  SOLN,           



                                                  Dosing           



                                                  Weight           



                                                  127.727,           



                                                  kg,            



                                                  ONCALL,           



                                                  STAT,           



                                                  Start           



                                                  date:           



                                                  22           



                                                  15:04:00           



                                                  CDT,           



                                                  Duration:           



                                                  1 doses or           



                                                  times,           



                                                  Dose =           



                                                  2.2ml/kg,           



                                                  Max dose =           



                                                  100ml --           



                                                  "To be           



                                                  infused by           



                                                  Radiology           



                                                  Staff           



                                                  ONLY"           

 

     Iohexol      2-0      No                       100 mL,           [facility]



               3-25                               Route:           l



               20:04:                               IVP, Drug           [facility]



               00                                 Form:           



                                                  SOLN,           



                                                  Dosing           



                                                  Weight           



                                                  127.727,           



                                                  kg,            



                                                  ONCALL,           



                                                  STAT,           



                                                  Start           



                                                  date:           



                                                  22           



                                                  15:04:00           



                                                  CDT,           



                                                  Duration:           



                                                  1 doses or           



                                                  times,           



                                                  Dose =           



                                                  2.2ml/kg,           



                                                  Max dose =           



                                                  100ml --           



                                                  "To be           



                                                  infused by           



                                                  Radiology           



                                                  Staff           



                                                  ONLY"           

 

     Iohexol      2-0      No                       100 mL,           [facility]



               3-25                               Route:           l



               20:04:                               IVP, Drug           [place]



               00                                 Form:           



                                                  SOLN,           



                                                  Dosing           



                                                  Weight           



                                                  127.727,           



                                                  kg,            



                                                  ONCALL,           



                                                  STAT,           



                                                  Start           



                                                  date:           



                                                  22           



                                                  15:04:00           



                                                  CDT,           



                                                  Duration:           



                                                  1 doses or           



                                                  times,           



                                                  Dose =           



                                                  2.2ml/kg,           



                                                  Max dose =           



                                                  100ml --           



                                                  "To be           



                                                  infused by           



                                                  Radiology           



                                                  Staff           



                                                  ONLY"           

 

     Iohexol      2022-0      No                       100 mL,           [facility]



               3-25                               Route:           l



               20:04:                               IVP, Drug           [place]



               00                                 Form:           



                                                  SOLN,           



                                                  Dosing           



                                                  Weight           



                                                  127.727,           



                                                  kg,            



                                                  ONCALL,           



                                                  STAT,           



                                                  Start           



                                                  date:           



                                                  22           



                                                  15:04:00           



                                                  CDT,           



                                                  Duration:           



                                                  1 doses or           



                                                  times,           



                                                  Dose =           



                                                  2.2ml/kg,           



                                                  Max dose =           



                                                  100ml --           



                                                  "To be           



                                                  infused by           



                                                  Radiology           



                                                  Staff           



                                                  ONLY"           

 

     Iohexol            No                       100 mL,           [facility]



               3-25                               Route:           l



               20:04:                               IVP, Drug           [place]



               00                                 Form:           



                                                  SOLN,           



                                                  Dosing           



                                                  Weight           



                                                  127.727,           



                                                  kg,            



                                                  ONCALL,           



                                                  STAT,           



                                                  Start           



                                                  date:           



                                                  22           



                                                  15:04:00           



                                                  CDT,           



                                                  Duration:           



                                                  1 doses or           



                                                  times,           



                                                  Dose =           



                                                  2.2ml/kg,           



                                                  Max dose =           



                                                  100ml --           



                                                  "To be           



                                                  infused by           



                                                  Radiology           



                                                  Staff           



                                                  ONLY"           

 

     Iohexol            No                       100 mL,           [facility]



               3-25                               Route:           l



               20:04:                               IVP, Drug           [place]



                                                Form:           



                                                  SOLN,           



                                                  Dosing           



                                                  Weight           



                                                  127.727,           



                                                  kg,            



                                                  ONCALL,           



                                                  STAT,           



                                                  Start           



                                                  date:           



                                                  22           



                                                  15:04:00           



                                                  CDT,           



                                                  Duration:           



                                                  1 doses or           



                                                  times,           



                                                  Dose =           



                                                  2.2ml/kg,           



                                                  Max dose =           



                                                  100ml --           



                                                  "To be           



                                                  infused by           



                                                  Radiology           



                                                  Staff           



                                                  ONLY"           

 

     Docusate            No                       Notes:           [facility]



               3-25                               (Same as:           l



               14:00:                               Colace)           [facility]



                                                (Do Not           



                                                  Crush)           

 

     sennosides,            No                       Notes:           [facility]

[facility]



     USP       3-25                               (Same as:           l



               14:00:                               Senokot)           [facility]



               00                                                

 

     Saline            No                       Notes:           [facility]



     Flush 0.9%      3-25                               preservati           l



               14:00:                               ve free.           [place]



                                                               

 

     Docusate            No                       Notes:           [facility]



               3-25                               (Same as:           l



               14:00:                               Colace)           [facility]



               00                                 (Do Not           



                                                  Crush)           

 

     sennosides,            No                       Notes:           [facility]

ra[facility]ev



     USP       3-25                               (Same as:           l



               14:00:                               Senokot)           [facility]



               00                                                

 

     Saline            No                       Notes:           [facility]



     Flush 0.9%      3-25                               preservati           l



               14:00:                               ve free.           [place]



                                                               

 

     Docusate            No                       Notes:           [facility]



               3-25                               (Same as:           l



               14:00:                               Colace)           [place]



               00                                 (Do Not           



                                                  Crush)           

 

     sennosides,            No                       Notes:           [facility]

[facility]ev



     USP       3-25                               (Same as:           l



               14:00:                               Senokot)           [place]



               00                                                

 

     Saline            No                       Notes:           [facility]



     Flush 0.9%      3-25                               preservati           l



               14:00:                               ve free.           [facility]                                                

 

     Docusate            No                       Notes:           [facility]



               3-25                               (Same as:           l



               14:00:                               Colace)           [facility]



               00                                 (Do Not           



                                                  Crush)           

 

     sennosides,            No                       Notes:           [facility]

[facility]



     USP       3-25                               (Same as:           l



               14:00:                               Senokot)           [place]



                                                               

 

     Saline            No                       Notes:           [facility]



     Flush 0.9%      3-25                               preservati           l



               14:00:                               ve free.           [facility]                                                

 

     Docusate            No                       Notes:           [facility]



               3-25                               (Same as:           l



               14:00:                               Colace)           [facility]



                                                (Do Not           



                                                  Crush)           

 

     sennosides,            No                       Notes:           [facility]

[facility]



     USP       3-25                               (Same as:           l



               14:00:                               Senokot)           [place]                                                

 

     Saline            No                       Notes:           [facility]



     Flush 0.9%      3-25                               preservati           l



               14:00:                               ve free.           [facility]                                                

 

     Docusate            No                       Notes:           [facility]



               3-25                               (Same as:           l



               14:00:                               Colace)           [facility]



                                                (Do Not           



                                                  Crush)           

 

     sennosides,            No                       Notes:           [facility]

[facility]



     USP       3-25                               (Same as:           l



               14:00:                               Senokot)           [facility]                                                

 

     Saline            No                       Notes:           [facility]



     Flush 0.9%      3-25                               preservati           l



               14:00:                               ve free.           [facility]                                                

 

     Docusate            No                       Notes:           [facility]



               3-25                               (Same as:           l



               14:00:                               Colace)           [place]



               00                                 (Do Not           



                                                  Crush)           

 

     sennosides,            No                       Notes:           [facility]

[facility]



     USP       3-25                               (Same as:           l



               14:00:                               Senokot)           [place]



                                                               

 

     Saline            No                       Notes:           [facility]



     Flush 0.9%      3-25                               preservati           l



               14:00:                               ve free.           [facility]                                                

 

     Docusate            No                       Notes:           [facility]



               3-25                               (Same as:           l



               14:00:                               Colace)           [facility]



                                                (Do Not           



                                                  Crush)           

 

     sennosides,            No                       Notes:           [facility]

[facility]



     USP       3-25                               (Same as:           l



               14:00:                               Senokot)                                                           

 

     Saline            No                       Notes:           [facility]



     Flush 0.9%      3-25                               preservati           l



               14:00:                               ve free.           [facility]



                                                               

 

     influenza            Yes                      Notes:           [facility]

a



     virus      3-25                               (Same as:           l



     vaccine,      06:10:                               Fluzone           [facility]

n



     inactivated      26                                 [place]           



                                                  nt,            



                                                  Fluarix           



                                                  [place]           



                                                  nt) For           



                                                  patients 6           



                                                  - 35           



                                                  months of           



                                                  age (0.5           



                                                  mL IM) For           



                                                  3 years of           



                                                  age and           



                                                  older (0.5           



                                                  mL IM)           



                                                  Shake well           



                                                  before use           

 

     influenza            Yes                      Notes:           [facility]

a



     virus      3-25                               (Same as:           l



     vaccine,      06:10:                               Fluzone           [facility]

n



     inactivated      26                                 [place]           



                                                  nt,            



                                                  Fluarix           



                                                  [place]           



                                                  nt) For           



                                                  patients 6           



                                                  - 35           



                                                  months of           



                                                  age (0.5           



                                                  mL IM) For           



                                                  3 years of           



                                                  age and           



                                                  older (0.5           



                                                  mL IM)           



                                                  Shake well           



                                                  before use           

 

     influenza            Yes                      Notes:           [facility]

a



     virus      3-25                               (Same as:           l



     vaccine,      06:10:                               Fluzone           [facility]

n



     inactivated      26                                 [place]           



                                                  nt,            



                                                  Fluarix           



                                                  [place]           



                                                  nt) For           



                                                  patients 6           



                                                  - 35           



                                                  months of           



                                                  age (0.5           



                                                  mL IM) For           



                                                  3 years of           



                                                  age and           



                                                  older (0.5           



                                                  mL IM)           



                                                  Shake well           



                                                  before use           

 

     influenza            Yes                      Notes:           [facility]

a



     virus      3-25                               (Same as:           l



     vaccine,      06:10:                               Fluzone           [facility]

n



     inactivated      26                                 [place]           



                                                  nt,            



                                                  Fluarix           



                                                  [place]           



                                                  nt) For           



                                                  patients 6           



                                                  - 35           



                                                  months of           



                                                  age (0.5           



                                                  mL IM) For           



                                                  3 years of           



                                                  age and           



                                                  older (0.5           



                                                  mL IM)           



                                                  Shake well           



                                                  before use           

 

     influenza            Yes                      Notes:           [facility]

a



     virus      3-25                               (Same as:           l



     vaccine,      06:10:                               Fluzone           [facility]

n



     inactivated      26                                 [place]           



                                                  nt,            



                                                  Fluarix           



                                                  [place]           



                                                  nt) For           



                                                  patients 6           



                                                  - 35           



                                                  months of           



                                                  age (0.5           



                                                  mL IM) For           



                                                  3 years of           



                                                  age and           



                                                  older (0.5           



                                                  mL IM)           



                                                  Shake well           



                                                  before use           

 

     influenza            Yes                      Notes:           [facility]

a



     virus      3-25                               (Same as:           l



     vaccine,      06:10:                               Fluzone           [facility]

n



     inactivated      26                                 [place]           



                                                  nt,            



                                                  Fluarix           



                                                  [place]           



                                                  nt) For           



                                                  patients 6           



                                                  - 35           



                                                  months of           



                                                  age (0.5           



                                                  mL IM) For           



                                                  3 years of           



                                                  age and           



                                                  older (0.5           



                                                  mL IM)           



                                                  Shake well           



                                                  before use           

 

     influenza            Yes                      Notes:           [facility]

a



     virus      3-25                               (Same as:           l



     vaccine,      06:10:                               Fluzone           [facility]

n



     inactivated      26                                 [place]           



                                                  nt,            



                                                  Fluarix           



                                                  [place]           



                                                  nt) For           



                                                  patients 6           



                                                  - 35           



                                                  months of           



                                                  age (0.5           



                                                  mL IM) For           



                                                  3 years of           



                                                  age and           



                                                  older (0.5           



                                                  mL IM)           



                                                  Shake well           



                                                  before use           

 

     influenza            Yes                      Notes:           [facility]

a



     virus      3-25                               (Same as:           l



     vaccine,      06:10:                               Fluzone           [facility]

n



     inactivated      26                                 [place]           



                                                  nt,            



                                                  Fluarix           



                                                  [place]           



                                                  nt) For           



                                                  patients 6           



                                                  - 35           



                                                  months of           



                                                  age (0.5           



                                                  mL IM) For           



                                                  3 years of           



                                                  age and           



                                                  older (0.5           



                                                  mL IM)           



                                                  Shake well           



                                                  before use           

 

     Lorazepam            No                       Notes:           [facility]

a



               3-25                               (Same as:           l



               06:05:                               Ativan)           [facility]



                                                               

 

     Methocarbam            No                       Notes:           [facility]

[facility]



     ol        3-25                               (Same           l



               06:05:                               as:Robaxin           [facility]



                                                )              

 

     Lorazepam            No                       Notes:           [facility]

a



               3-25                               (Same as:           l



               06:05:                               Ativan)           [facility]



                                                               

 

     Methocarbam            No                       Notes:           [facility]

[facility]



     ol        3-25                               (Same           l



               06:05:                               as:Robaxin           [facility]



                                                )              

 

     Lorazepam            No                       Notes:           [facility]

a



               3-25                               (Same as:           l



               06:05:                               Ativan)           [facility]



                                                               

 

     Methocarbam            No                       Notes:           [facility]

[facility]



     ol        3-25                               (Same           l



               06:05:                               as:Robaxin           [place]



                                                )              

 

     Lorazepam            No                       Notes:           [facility]

a



               3-25                               (Same as:           l



               06:05:                               Ativan)           [facility]



                                                               

 

     Methocarbam            No                       Notes:           [facility]

[facility]



     ol        3-25                               (Same           l



               06:05:                               as:Robaxin           [facility]



                                                )              

 

     Lorazepam            No                       Notes:           [facility]

a



               3-25                               (Same as:           l



               06:05:                               Ativan)           [facility]



                                                               

 

     Methocarbam            No                       Notes:           [facility]

[facility]



     ol        3-25                               (Same           l



               06:05:                               as:Robaxin           [place]



                                                )              

 

     Lorazepam            No                       Notes:           [facility]

a



               3-25                               (Same as:           l



               06:05:                               Ativan)           [place]



                                                               

 

     Methocarbam            No                       Notes:           [facility]

[facility]



     ol        3-25                               (Same           l



               06:05:                               as:Robaxin                                            )              

 

     Lorazepam            No                       Notes:           [facility]

a



               3-25                               (Same as:           l



               06:05:                               Ativan)           [place]



                                                               

 

     Methocarbam            No                       Notes:           [facility]

[facility]



     ol        3-25                               (Same           l



               06:05:                               as:Robaxin                                            )              

 

     Lorazepam            No                       Notes:           [facility]

a



               3-25                               (Same as:           l



               06:05:                               Ativan)           [facility]



                                                               

 

     Methocarbam            No                       Notes:           [facility]

[facility]



     ol        3-25                               (Same           l



               06:05:                               as:Robaxin                                            )              

 

     Sodium            No                       1,000 mL,           [facility]

a



     Chloride      3-25                               Rate: 50           l



     0.9% IV      06:03:                               ml/hr,           [facility]



     1,000 mL      00                                 Infuse           



                                                  over: 20           



                                                  hr, Route:           



                                                  IV, Dosing           



                                                  Weight           



                                                  131.818           



                                                  kg, Total           



                                                  Volume:           



                                                  1,000,           



                                                  Start           



                                                  date:           



                                                  22           



                                                  1:03:00           



                                                  CDT,           



                                                  Duration:           



                                                  30 day,           



                                                  Stop date:           



                                                  22           



                                                  1:02:00           



                                                  CDT, BSA:           



                                                  2.4 m2, 0           

 

     Labetalol            No                       10 mg, 2           [facility]

[facility]



               3-25                               mL, Route:           l



               06:03:                               IVP, Drug                                            form: INJ,           



                                                  Q15Min,           



                                                  Dosing           



                                                  Weight           



                                                  131.818,           



                                                  kg, Start           



                                                  date:           



                                                  22           



                                                  1:03:00           



                                                  CDT,           



                                                  Duration:           



                                                  3 doses or           



                                                  times,           



                                                  Stop date:           



                                                  22           



                                                  1:33:00           



                                                  CDT, 0           

 

     Hydralazine            No                       Notes:           [facility]

[facility]



               3-25                               (Same as:           l



               06:03:                               Apresoline                                            ) Push           



                                                  over 5           



                                                  minutes           

 

     Ondansetron            No                       /= 4           [facility]

a



               3-25                               years,           l



               06:03:                               Pediatric           [facility]



               00                                 Dosing           

 

     Acetaminoph            No                       Notes: Do           [facility]

[facility]



     en 325 MG /      3-25                               not exceed           l



     Hydrocodone      06:03:                               4gm/day of           

[facility]



     Bitartrate      00                                 acetaminop           



     10 MG Oral                                         hen. (Same           



     Tablet                                         as: Norco           



     [Norco                                         325/10)           



     10/325]                                                        

 

     Dilaudid            No                       Notes:           [facility]



               3-25                               Same as           l



               06:03:                               Dilaudid           [facility]



               00                                                

 

     Benadryl            No                       Notes:           [facility]



               3-25                               (Same as:           l



               06:03:                               Benadryl)           [facility]



               00                                                

 

     phenol            No                       Notes:           [facility]



               3-25                               Chlorasept           l



               06:03:                               ic Spray           [facility]



               00                                 (Same as:           



                                                  Chlorasept           



                                                  ic, Sore           



                                                  Throat           



                                                  Spray)           



                                                  WASTE: F/P           



                                                  - Black; E           



                                                  -              



                                                  Municipal           



                                                  Trash Bin           

 

     Bisacodyl            No                       Notes:           [facility]

a



               3-25                               (Same As:           l



               06:03:                               Dulcolax,           [place]



               00                                 Bisco-Lax)           

 

     Robaxin            No                       Notes:           [facility]



               3-25                               (Same           l



               06:03:                               as:Robaxin                                            )              

 

     Melatonin 3            No                       Notes:           [facility]

[facility]



     MG Extended      3-25                               (Same as:           l



     Release      06:03:                               Melatonin)           [facility]

[facility]



     Tablet      00                                                

 

     Tylenol            No                       Notes: Do           [facility]

ia



               3-25                               not exceed           l



               06:03:                               4 gm/day.           [facility]



               00                                 (Same as:           



                                                  Tylenol)           

 

     Reglan            No                       Notes:           [facility]



               3-25                               (Same as:           l



               06:03:                               [facility])           [facility]



               00                                                

 

     Phenergan            No                       Notes: Do           [facility]

[facility]



               3-25                               not give           l



               06:03:                               IV push.           [facility]



               00                                 (Same as:           



                                                  Phenergan)           

 

     Saline            No                       Notes:           [facility]



     Flush 0.9%      3-25                               preservati           l



               06:03:                               ve free.           [place]



               00                                                

 

     Sodium            No                       1,000 mL,           [facility]

a



     Chloride      3-25                               Rate: 50           l



     0.9% IV      06:03:                               ml/hr,           [place]



     1,000 mL      00                                 Infuse           



                                                  over: 20           



                                                  hr, Route:           



                                                  IV, Dosing           



                                                  Weight           



                                                  131.818           



                                                  kg, Total           



                                                  Volume:           



                                                  1,000,           



                                                  Start           



                                                  date:           



                                                  22           



                                                  1:03:00           



                                                  CDT,           



                                                  Duration:           



                                                  30 day,           



                                                  Stop date:           



                                                  22           



                                                  1:02:00           



                                                  CDT, BSA:           



                                                  2.4 m2, 0           

 

     Labetalol            No                       10 mg, 2           [facility]

[facility]



               3-25                               mL, Route:           l



               06:03:                               IVP, Drug                                            form: INJ,           



                                                  Q15Min,           



                                                  Dosing           



                                                  Weight           



                                                  131.818,           



                                                  kg, Start           



                                                  date:           



                                                  22           



                                                  1:03:00           



                                                  CDT,           



                                                  Duration:           



                                                  3 doses or           



                                                  times,           



                                                  Stop date:           



                                                  22           



                                                  1:33:00           



                                                  CDT, 0           

 

     Hydralazine            No                       Notes:           [facility]

[facility]



               3-25                               (Same as:           l



               06:03:                               Apresoline                                            ) Push           



                                                  over 5           



                                                  minutes           

 

     Ondansetron            No                       /= 4           [facility]

a



               3-25                               years,           l



               06:03:                               Pediatric                                            Dosing           

 

     Acetaminoph            No                       Notes: Do           [facility]

[facility]



     en 325 MG /      3-25                               not exceed           l



     Hydrocodone      06:03:                               4gm/day of           





     Bitartrate      00                                 acetaminop           



     10 MG Oral                                         hen. (Same           



     Tablet                                         as: Norco           



     [Norco                                         325/10)           



     10/325]                                                        

 

     Dilaudid            No                       Notes:           [facility]



               3-25                               Same as           l



               06:03:                               Dilaudid                                                           

 

     Benadryl            No                       Notes:           [facility]



               3-25                               (Same as:           l



               06:03:                               Benadryl)                                                           

 

     phenol            No                       Notes:           [facility]



               3-25                               Chlorasept           l



               06:03:                               ic Spray                                            (Same as:           



                                                  Chlorasept           



                                                  ic, Sore           



                                                  Throat           



                                                  Spray)           



                                                  WASTE: F/P           



                                                  - Black; E           



                                                  -              



                                                  Municipal           



                                                  Trash Bin           

 

     Bisacodyl            No                       Notes:           [facility]

a



               3-25                               (Same As:           l



               06:03:                               Dulcolax,                                            Bisco-Lax)           

 

     Robaxin            No                       Notes:           [facility]



               3-25                               (Same           l



               06:03:                               as:Robaxin                                            )              

 

     Melatonin 3            No                       Notes:           [facility]

[facility]



     MG Extended      3-25                               (Same as:           l



     Release      06:03:                               Melatonin)           [facility]

[place]



     Tablet      00                                                

 

     Tylenol            No                       Notes: Do           [facility]

[facility]



               3-25                               not exceed           l



               06:03:                               4 gm/day.           [place]



                                                (Same as:           



                                                  Tylenol)           

 

     Reglan            No                       Notes:           [facility]



               3-25                               (Same as:           l



               06:03:                               Reglan)                                                           

 

     Phenergan            No                       Notes: Do           [facility]

[facility]



               3-25                               not give           l



               06:03:                               IV push.                                            (Same as:           



                                                  Phenergan)           

 

     Saline            No                       Notes:           [facility]



     Flush 0.9%      3-25                               preservati           l



               06:03:                               ve free.                                                           

 

     Sodium            No                       1,000 mL,           [facility]

a



     Chloride      3-25                               Rate: 50           l



     0.9% IV      06:03:                               ml/hr,           [facility]



     1,000 mL      00                                 Infuse           



                                                  over: 20           



                                                  hr, Route:           



                                                  IV, Dosing           



                                                  Weight           



                                                  131.818           



                                                  kg, Total           



                                                  Volume:           



                                                  1,000,           



                                                  Start           



                                                  date:           



                                                  22           



                                                  1:03:00           



                                                  CDT,           



                                                  Duration:           



                                                  30 day,           



                                                  Stop date:           



                                                  22           



                                                  1:02:00           



                                                  CDT, BSA:           



                                                  2.4 m2, 0           

 

     Labetalol            No                       10 mg, 2           [facility]

[facility]



               3-25                               mL, Route:           l



               06:03:                               IVP, Drug                                            form: INJ,           



                                                  Q15Min,           



                                                  Dosing           



                                                  Weight           



                                                  131.818,           



                                                  kg, Start           



                                                  date:           



                                                  22           



                                                  1:03:00           



                                                  CDT,           



                                                  Duration:           



                                                  3 doses or           



                                                  times,           



                                                  Stop date:           



                                                  22           



                                                  1:33:00           



                                                  CDT, 0           

 

     Hydralazine            No                       Notes:           [facility]

[facility]



               3-25                               (Same as:           l



               06:03:                               Apresoline                                            ) Push           



                                                  over 5           



                                                  minutes           

 

     Ondansetron            No                       /= 4           [facility]

a



               3-25                               years,           l



               06:03:                               Pediatric                                            Dosing           

 

     Acetaminoph            No                       Notes: Do           [facility]

[facility]



     en 325 MG /      3-25                               not exceed           l



     Hydrocodone      06:03:                               4gm/day of           

[place]



     Bitartrate      00                                 acetaminop           



     10 MG Oral                                         hen. (Same           



     Tablet                                         as: Norco           



     [Norco                                         325/10)           



     10/325]                                                        

 

     Dilaudid            No                       Notes:           [facility]



               3-25                               Same as           l



               06:03:                               Dilaudid                                                           

 

     Benadryl            No                       Notes:           [facility]



               3-25                               (Same as:           l



               06:03:                               Benadryl)                                                           

 

     phenol            No                       Notes:           [facility]



               3-25                               Chlorasept           l



               06:03:                               ic Spray                                            (Same as:           



                                                  Chlorasept           



                                                  ic, Sore           



                                                  Throat           



                                                  Spray)           



                                                  WASTE: F/P           



                                                  - Black; E           



                                                  -              



                                                  Municipal           



                                                  Trash Bin           

 

     Bisacodyl            No                       Notes:           [facility]

a



               3-25                               (Same As:           l



               06:03:                               Dulcolax,           [place]



                                                Bisco-Lax)           

 

     Robaxin            No                       Notes:           [facility]



               3-25                               (Same           l



               06:03:                               as:Robaxin                                            )              

 

     Melatonin 3            No                       Notes:           [facility]

[facility]



     MG Extended      3-25                               (Same as:           l



     Release      06:03:                               Melatonin)           [facility]

[place]



     Tablet      00                                                

 

     Tylenol            No                       Notes: Do           [facility]

[facility]



               3-25                               not exceed           l



               06:03:                               4 gm/day.           [facility]



                                                (Same as:           



                                                  Tylenol)           

 

     Reglan            No                       Notes:           [facility]



               3-25                               (Same as:           l



               06:03:                               Reglan)           [facility]



                                                               

 

     Phenergan            No                       Notes: Do           [facility]

[facility]



               3-25                               not give           l



               06:03:                               IV push.           [facility]



                                                (Same as:           



                                                  Phenergan)           

 

     Saline            No                       Notes:           [facility]



     Flush 0.9%      3-25                               preservati           l



               06:03:                               ve free.                                                           

 

     Sodium            No                       1,000 mL,           [facility]

a



     Chloride      3-25                               Rate: 50           l



     0.9% IV      06:03:                               ml/hr,           [facility]



     1,000 mL      00                                 Infuse           



                                                  over: 20           



                                                  hr, Route:           



                                                  IV, Dosing           



                                                  Weight           



                                                  131.818           



                                                  kg, Total           



                                                  Volume:           



                                                  1,000,           



                                                  Start           



                                                  date:           



                                                  22           



                                                  1:03:00           



                                                  CDT,           



                                                  Duration:           



                                                  30 day,           



                                                  Stop date:           



                                                  22           



                                                  1:02:00           



                                                  CDT, BSA:           



                                                  2.4 m2, 0           

 

     Labetalol            No                       10 mg, 2           [facility]

[facility]



               3-25                               mL, Route:           l



               06:03:                               IVP, Drug                                            form: INJ,           



                                                  Q15Min,           



                                                  Dosing           



                                                  Weight           



                                                  131.818,           



                                                  kg, Start           



                                                  date:           



                                                  22           



                                                  1:03:00           



                                                  CDT,           



                                                  Duration:           



                                                  3 doses or           



                                                  times,           



                                                  Stop date:           



                                                  22           



                                                  1:33:00           



                                                  CDT, 0           

 

     Hydralazine            No                       Notes:           [facility]

[facility]



               3-25                               (Same as:           l



               06:03:                               Apresoline           [facility]



               00                                 ) Push           



                                                  over 5           



                                                  minutes           

 

     Ondansetron            No                       /= 4           [facility]

a



               3-25                               years,           l



               06:03:                               Pediatric           [place]



               00                                 Dosing           

 

     Acetaminoph            No                       Notes: Do           [facility]

[facility]



     en 325 MG /      3-25                               not exceed           l



     Hydrocodone      06:03:                               4gm/day of           

[place]



     Bitartrate      00                                 acetaminop           



     10 MG Oral                                         hen. (Same           



     Tablet                                         as: Norco           



     [Norco                                         325/10)           



     10/325]                                                        

 

     Dilaudid            No                       Notes:           [facility]



               3-25                               Same as           l



               06:03:                               Dilaudid           [place]



               00                                                

 

     Benadryl            No                       Notes:           [facility]



               3-25                               (Same as:           l



               06:03:                               Benadryl)           [facility]



                                                               

 

     phenol            No                       Notes:           [facility]



               3-25                               Chlorasept           l



               06:03:                               ic Spray                                            (Same as:           



                                                  Chlorasept           



                                                  ic, Sore           



                                                  Throat           



                                                  Spray)           



                                                  WASTE: F/P           



                                                  - Black; E           



                                                  -              



                                                  Municipal           



                                                  Trash Bin           

 

     Bisacodyl            No                       Notes:           [facility]

a



               3-25                               (Same As:           l



               06:03:                               Dulcolax,           [place]



                                                Bisco-Lax)           

 

     Robaxin            No                       Notes:           [facility]



               3-25                               (Same           l



               06:03:                               as:Robaxin                                            )              

 

     Melatonin 3            No                       Notes:           [facility]

[facility]



     MG Extended      3-25                               (Same as:           l



     Release      06:03:                               Melatonin)           [facility]

[place]



     Tablet      00                                                

 

     Tylenol            No                       Notes: Do           [facility]

[facility]



               3-25                               not exceed           l



               06:03:                               4 gm/day.           [facility]



                                                (Same as:           



                                                  Tylenol)           

 

     Reglan            No                       Notes:           [facility]



               3-25                               (Same as:           l



               06:03:                               Reglan)           [place]



                                                               

 

     Phenergan            No                       Notes: Do           [facility]

[facility]



               3-25                               not give           l



               06:03:                               IV push.                                            (Same as:           



                                                  Phenergan)           

 

     Saline            No                       Notes:           [facility]



     Flush 0.9%      3-25                               preservati           l



               06:03:                               ve free.                                                           

 

     Sodium            No                       1,000 mL,           [facility]

a



     Chloride      3-25                               Rate: 50           l



     0.9% IV      06:03:                               ml/hr,           [facility]



     1,000 mL      00                                 Infuse           



                                                  over: 20           



                                                  hr, Route:           



                                                  IV, Dosing           



                                                  Weight           



                                                  131.818           



                                                  kg, Total           



                                                  Volume:           



                                                  1,000,           



                                                  Start           



                                                  date:           



                                                  22           



                                                  1:03:00           



                                                  CDT,           



                                                  Duration:           



                                                  30 day,           



                                                  Stop date:           



                                                  22           



                                                  1:02:00           



                                                  CDT, BSA:           



                                                  2.4 m2, 0           

 

     Labetalol            No                       10 mg, 2           [facility]

[facility]



               3-25                               mL, Route:           l



               06:03:                               IVP, Drug                                            form: INJ,           



                                                  Q15Min,           



                                                  Dosing           



                                                  Weight           



                                                  131.818,           



                                                  kg, Start           



                                                  date:           



                                                  22           



                                                  1:03:00           



                                                  CDT,           



                                                  Duration:           



                                                  3 doses or           



                                                  times,           



                                                  Stop date:           



                                                  22           



                                                  1:33:00           



                                                  CDT, 0           

 

     Hydralazine            No                       Notes:           [facility]

[facility]



               3-25                               (Same as:           l



               06:03:                               Apresoline                                            ) Push           



                                                  over 5           



                                                  minutes           

 

     Ondansetron            No                       /= 4           [facility]

a



               3-25                               years,           l



               06:03:                               Pediatric                                            Dosing           

 

     Acetaminoph            No                       Notes: Do           [facility]

[facility]



     en 325 MG /      3-25                               not exceed           l



     Hydrocodone      06:03:                               4gm/day of           

[place]



     Bitartrate      00                                 acetaminop           



     10 MG Oral                                         hen. (Same           



     Tablet                                         as: Norco           



     [Norco                                         325/10)           



     10/325]                                                        

 

     Dilaudid            No                       Notes:           [facility]



               3-25                               Same as           l



               06:03:                               Dilaudid                                                           

 

     Benadryl            No                       Notes:           [facility]



               3-25                               (Same as:           l



               06:03:                               Benadryl)                                                           

 

     phenol            No                       Notes:           [facility]



               3-25                               Chlorasept           l



               06:03:                               ic Spray                                            (Same as:           



                                                  Chlorasept           



                                                  ic, Sore           



                                                  Throat           



                                                  Spray)           



                                                  WASTE: F/P           



                                                  - Black; E           



                                                  -              



                                                  Municipal           



                                                  Trash Bin           

 

     Bisacodyl            No                       Notes:           [facility]

a



               3-25                               (Same As:           l



               06:03:                               Dulcolax,           [place]                                 Bisco-Lax)           

 

     Robaxin            No                       Notes:           [facility]



               3-25                               (Same           l



               06:03:                               as:Robaxin                                            )              

 

     Melatonin 3            No                       Notes:           [facility]

[facility]



     MG Extended      3-25                               (Same as:           l



     Release      06:03:                               Melatonin)           [facility]

[place]



     Tablet      00                                                

 

     Tylenol            No                       Notes: Do           [facility]

[facility]



               3-25                               not exceed           l



               06:03:                               4 gm/day.           [facility]



               00                                 (Same as:           



                                                  Tylenol)           

 

     Reglan            No                       Notes:           [facility]



               3-25                               (Same as:           l



               06:03:                               Reglan)           [facility]



                                                               

 

     Phenergan            No                       Notes: Do           [facility]

[facility]



               3-25                               not give           l



               06:03:                               IV push.           [place]



                                                (Same as:           



                                                  Phenergan)           

 

     Saline            No                       Notes:           [facility]



     Flush 0.9%      3-25                               preservati           l



               06:03:                               ve free.           [place]



                                                               

 

     Sodium            No                       1,000 mL,           [facility]

a



     Chloride      3-25                               Rate: 50           l



     0.9% IV      06:03:                               ml/hr,           [place]



     1,000 mL      00                                 Infuse           



                                                  over: 20           



                                                  hr, Route:           



                                                  IV, Dosing           



                                                  Weight           



                                                  131.818           



                                                  kg, Total           



                                                  Volume:           



                                                  1,000,           



                                                  Start           



                                                  date:           



                                                  22           



                                                  1:03:00           



                                                  CDT,           



                                                  Duration:           



                                                  30 day,           



                                                  Stop date:           



                                                  22           



                                                  1:02:00           



                                                  CDT, BSA:           



                                                  2.4 m2, 0           

 

     Labetalol            No                       10 mg, 2           [facility]

[facility]



               3-25                               mL, Route:           l



               06:03:                               IVP, Drug                                            form: INJ,           



                                                  Q15Min,           



                                                  Dosing           



                                                  Weight           



                                                  131.818,           



                                                  kg, Start           



                                                  date:           



                                                  22           



                                                  1:03:00           



                                                  CDT,           



                                                  Duration:           



                                                  3 doses or           



                                                  times,           



                                                  Stop date:           



                                                  22           



                                                  1:33:00           



                                                  CDT, 0           

 

     Hydralazine            No                       Notes:           [facility]

[facility]



               3-25                               (Same as:           l



               06:03:                               Apresoline                                            ) Push           



                                                  over 5           



                                                  minutes           

 

     Ondansetron            No                       /= 4           [facility]

a



               3-25                               years,           l



               06:03:                               Pediatric           [facility]



               00                                 Dosing           

 

     Acetaminoph            No                       Notes: Do           [facility]

[facility]



     en 325 MG /      3-25                               not exceed           l



     Hydrocodone      06:03:                               4gm/day of           

[place]



     Bitartrate      00                                 acetaminop           



     10 MG Oral                                         hen. (Same           



     Tablet                                         as: Norco           



     [Norco                                         325/10)           



     10/325]                                                        

 

     Dilaudid            No                       Notes:           [facility]



               3-25                               Same as           l



               06:03:                               Dilaudid           [facility]                                                

 

     Benadryl            No                       Notes:           [facility]



               3-25                               (Same as:           l



               06:03:                               Benadryl)           [place]



                                                               

 

     phenol            No                       Notes:           [facility]



               3-25                               Chlorasept           l



               06:03:                               ic Spray                                            (Same as:           



                                                  Chlorasept           



                                                  ic, Sore           



                                                  Throat           



                                                  Spray)           



                                                  WASTE: F/P           



                                                  - Black; E           



                                                  -              



                                                  Municipal           



                                                  Trash Bin           

 

     Bisacodyl            No                       Notes:           [facility]

a



               3-25                               (Same As:           l



               06:03:                               Dulcolax,           [place]                                 Bisco-Lax)           

 

     Robaxin            No                       Notes:           [facility]



               3-25                               (Same           l



               06:03:                               as:Robaxin                                            )              

 

     Melatonin 3            No                       Notes:           [facility]

[facility]



     MG Extended      3-25                               (Same as:           l



     Release      06:03:                               Melatonin)           [facility]

[place]



     Tablet                                                      

 

     Tylenol            No                       Notes: Do           [facility]

[facility]



               3-25                               not exceed           l



               06:03:                               4 gm/day.           [place]



               00                                 (Same as:           



                                                  Tylenol)           

 

     Reglan            No                       Notes:           [facility]



               3-25                               (Same as:           l



               06:03:                               Reglan)           [place]                                                

 

     Phenergan            No                       Notes: Do           [facility]

[facility]



               3-25                               not give           l



               06:03:                               IV push.           [place]



                                                (Same as:           



                                                  Phenergan)           

 

     Saline            No                       Notes:           [facility]



     Flush 0.9%      3-25                               preservati           l



               06:03:                               ve free.           [place]



                                                               

 

     Sodium            No                       1,000 mL,           [facility]

[facility]



     Chloride      3-25                               Rate: 50           l



     0.9% IV      06:03:                               ml/hr,           [facility]



     1,000 mL      00                                 Infuse           



                                                  over: 20           



                                                  hr, Route:           



                                                  IV, Dosing           



                                                  Weight           



                                                  131.818           



                                                  kg, Total           



                                                  Volume:           



                                                  1,000,           



                                                  Start           



                                                  date:           



                                                  22           



                                                  1:03:00           



                                                  CDT,           



                                                  Duration:           



                                                  30 day,           



                                                  Stop date:           



                                                  22           



                                                  1:02:00           



                                                  CDT, BSA:           



                                                  2.4 m2, 0           

 

     Labetalol            No                       10 mg, 2           [facility]

[facility]



               3-25                               mL, Route:           l



               06:03:                               IVP, Drug                                            form: INJ,           



                                                  Q15Min,           



                                                  Dosing           



                                                  Weight           



                                                  131.818,           



                                                  kg, Start           



                                                  date:           



                                                  22           



                                                  1:03:00           



                                                  CDT,           



                                                  Duration:           



                                                  3 doses or           



                                                  times,           



                                                  Stop date:           



                                                  22           



                                                  1:33:00           



                                                  CDT, 0           

 

     Hydralazine            No                       Notes:           [facility]

[facility]



               3-25                               (Same as:           l



               06:03:                               Apresoline                                            ) Push           



                                                  over 5           



                                                  minutes           

 

     Ondansetron            No                       /= 4           [facility]

a



               3-25                               years,           l



               06:03:                               Pediatric                                            Dosing           

 

     Acetaminoph            No                       Notes: Do           [facility]

[facility]



     en 325 MG /      3-25                               not exceed           l



     Hydrocodone      06:03:                               4gm/day of           





     Bitartrate      00                                 acetaminop           



     10 MG Oral                                         hen. (Same           



     Tablet                                         as: Norco           



     [Norco                                         325/10)           



     [address]]                                                        

 

     Dilaudid            No                       Notes:           [facility]



               3-25                               Same as           l



               06:03:                               Dilaudid                                                           

 

     Benadryl            No                       Notes:           [facility]



               3-25                               (Same as:           l



               06:03:                               Benadryl)           [facility]



                                                               

 

     phenol            No                       Notes:           [facility]



               3-25                               Chlorasept           l



               06:03:                               ic Spray                                            (Same as:           



                                                  Chlorasept           



                                                  ic, Sore           



                                                  Throat           



                                                  Spray)           



                                                  WASTE: F/P           



                                                  - Black; E           



                                                  -              



                                                  Municipal           



                                                  Trash Bin           

 

     Bisacodyl            No                       Notes:           [facility]

a



               3-25                               (Same As:           l



               06:03:                               Dulcolax,                                            Bisco-Lax)           

 

     Robaxin            No                       Notes:           [facility]



               3-25                               (Same           l



               06:03:                               as:Robaxin                                            )              

 

     Melatonin 3            No                       Notes:           [facility]

rajeev



     MG Extended      3-25                               (Same as:           l



     Release      06:03:                               Melatonin)           [facility]

[place]



     Tablet      00                                                

 

     Tylenol            No                       Notes: Do           [facility]

ia



               3-25                               not exceed           l



               06:03:                               4 gm/day.           [place]



                                                (Same as:           



                                                  Tylenol)           

 

     Reglan            No                       Notes:           [facility]



               3-25                               (Same as:           l



               06:03:                               Reglan)                                                           

 

     Phenergan            No                       Notes: Do           Mem

oria



               3-25                               not give           l



               06:03:                               IV push.           [facility]                                 (Same as:           



                                                  Phenergan)           

 

     Saline            No                       Notes:           [facility]



     Flush 0.9%      3-25                               preservati           l



               06:03:                               ve free.           [facility]                                                

 

     Sodium            No                       1,000 mL,           [facility]

a



     Chloride      3-25                               Rate: 50           l



     0.9% IV      06:03:                               ml/hr,           [place]



     1,000 mL      00                                 Infuse           



                                                  over: 20           



                                                  hr, Route:           



                                                  IV, Dosing           



                                                  Weight           



                                                  131.818           



                                                  kg, Total           



                                                  Volume:           



                                                  1,000,           



                                                  Start           



                                                  date:           



                                                  22           



                                                  1:03:00           



                                                  CDT,           



                                                  Duration:           



                                                  30 day,           



                                                  Stop date:           



                                                  22           



                                                  1:02:00           



                                                  CDT, BSA:           



                                                  2.4 m2, 0           

 

     Labetalol            No                       10 mg, 2           [facility]

[facility]



               3-25                               mL, Route:           l



               06:03:                               IVP, Drug                                            form: INJ,           



                                                  Q15Min,           



                                                  Dosing           



                                                  Weight           



                                                  131.818,           



                                                  kg, Start           



                                                  date:           



                                                  22           



                                                  1:03:00           



                                                  CDT,           



                                                  Duration:           



                                                  3 doses or           



                                                  times,           



                                                  Stop date:           



                                                  22           



                                                  1:33:00           



                                                  CDT, 0           

 

     Hydralazine            No                       Notes:           [facility]

ra[facility]



               3-25                               (Same as:           l



               06:03:                               Apresoline                                            ) Push           



                                                  over 5           



                                                  minutes           

 

     Ondansetron            No                       /= 4           [facility]

a



               3-25                               years,           l



               06:03:                               Pediatric                                            Dosing           

 

     Acetaminoph            No                       Notes: Do           M

[facility]



     en 325 MG /      3-25                               not exceed           l



     Hydrocodone      06:03:                               4gm/day of           

[place]



     Bitartrate      00                                 acetaminop           



     10 MG Oral                                         hen. (Same           



     Tablet                                         as: Norco           



     [Norco                                         325/10)           



     10/325]                                                        

 

     Dilaudid            No                       Notes:           [facility]



               3-25                               Same as           l



               06:03:                               Dilaudid                                                           

 

     Benadryl            No                       Notes:           [facility]



               3-25                               (Same as:           l



               06:03:                               Benadryl)                                                           

 

     phenol            No                       Notes:           [facility]



               3-25                               Chlorasept           l



               06:03:                               ic Spray                                            (Same as:           



                                                  Chlorasept           



                                                  ic, Sore           



                                                  Throat           



                                                  Spray)           



                                                  WASTE: F/P           



                                                  - Black; E           



                                                  -              



                                                  Municipal           



                                                  Trash Bin           

 

     Bisacodyl            No                       Notes:           [facility]

a



               3-25                               (Same As:           l



               06:03:                               Dulcolax,                                            [employer]-Lax)           

 

     Robaxin            No                       Notes:           [facility]



               3-25                               (Same           l



               06:03:                               as:Robaxin                                            )              

 

     Melatonin 3            No                       Notes:           [facility]

ra[facility]



     MG Extended      3-25                               (Same as:           l



     Release      06:03:                               Melatonin)           

[place]



     Tablet      00                                                

 

     Tylenol            No                       Notes: Do           [facility]

ia



               3-25                               not exceed           l



               06:03:                               4 gm/day.           [facility]



                                                (Same as:           



                                                  Tylenol)           

 

     Reglan            No                       Notes:           [facility]



               3-25                               (Same as:           l



               06:03:                               Reglan)                                                           

 

     Phenergan            No                       Notes: Do           Mem

oria



               3-25                               not give           l



               06:03:                               IV push.                                            (Same as:           



                                                  Phenergan)           

 

     Saline            No                       Notes:           [facility]



     Flush 0.9%      3-25                               preservati           l



               06:03:                               ve free.                                                           

 

     butalbital-      2022- No             2{tbl}      2 tablet,         

  [facility]



     acetaminoph      3-18 03-18                          Oral,           [facility]



     en-caff      03:45: 03:10                          ONCE, 1           [place]



     (ESGIC)      00   :00                           dose, On           Medical



     -40                                         Thu            [facility]



     mg tablet 2                                         3/17/22 at           



     tablet                                         2245, ASAP           

 

     NaCl 0.9%      2022- No             500mL      at 999           [facility]

ers



     (NS) bolus      3-18 03-18                          mL/hr, 500           it

y of



     infusion      02:30: 03:17                          mL, IV           [place]



     500 mL      00   :00                           Infusion,           Medical



                                                  ONCE, 1           [facility]



                                                  dose, On           



                                                  Thu            



                                                  3/17/22 at           



                                                  2130, STAT           

 

     diphenhydrA       No             25mg      25 mg,           Un[facility]

vers



     MINE      3-18 03-18                          Slow IV           [facility] of



     (BENADRYL)      02:30: 01:46                          Push,           [place]



     injection      00   :00                           ONCE, 1           Medical



     25 mg                                         dose, On           [facility]



                                                  Thu            



                                                  3/17/22 at           



                                                  2130, STAT           

 

     magnesium      2022- No             2g        2 g, IV           [facility]

ers



     sulfate in      3-18 03-18                          Piggyback,           it

y of



     water 2      02:15: 03:17                          Administer           [facility]

as



     gram/50 mL      00   :00                           over 30           [facility]

l



     (4 %)                                         Minutes,           [facility]



     infusion 2                                         ONCE, 1           



     g                                            dose, On           



                                                  u            



                                                  3/17/22 at           



                                                  2115,           



                                                  Routine           

 

     HYDROmorpho       No             .5mg      0.5 mg,           Un

[facility]



     ne                                  Slow IV           ity of



     (DILAUDID)      01:30: 01:25                          Push,           [place]



     injection      00   :00                           ONCE, 1           Medical



     0.5 mg                                         dose, On           [facility]



                                                  22 at           



                                                  1930,           



                                                  Routine<br           



                                                  >Use           



                                                  approved           



                                                  by             



                                                  (Faculty):           



                                                  ADC            



                                                  PROVIDER           

 

     levoFLOXaci      2022- No        [id number] 750mg      Take 1        

   [facility]



     n 750 mg                                tablet by           ity o

f



     tablet      00:00: 05:59                          mouth           [place]



               00   :00                           daily for           Medical



                                                  4 days.           [facility]

 

     levoFLOXaci      2022- No        [id number] 750mg      Take 1        

   [facility]



     n 750 mg                                tablet by           ity o

f



     tablet      00:00: 05:59                          mouth           [place]



               00   :00                           daily for           Medical



                                                  4 days.           [facility]

 

     OXYCODONE            Yes                      Take by           Unive

rs



     HCL/ACETAMI      -25                               mouth.           ity of



     NOPHEN      19:49:                                              [place]



     (PERCOCET      27                                                Medical



     ORAL)                                                        [place]

 

     OXYCODONE            Yes                      Take by           Unive

rs



     HCL/ACETAMI      1-25                               mouth.           ity of



     NOPHEN      19:49:                                              [place]



     (PERCOCET      27                                                Medical



     ORAL)                                                        [place]

 

     OXYCODONE            Yes                      Take by           Unive

rs



     HCL/ACETAMI      -25                               mouth.           ity of



     NOPHEN      19:49:                                              [place]



     (PERCOCET      27                                                Medical



     ORAL)                                                        [place]

 

     OXYCODONE            Yes                      Take by           Unive

rs



     HCL/ACETAMI      1-25                               mouth.           ity of



     NOPHEN      19:49:                                              [place]



     (PERCOCET      27                                                Medical



     ORAL)                                                        [place]

 

     vancomycin      2022- No             125mg      125 mg,           Un

[facility]



     (VANCOCIN)                                Oral, QID,           it

y of



     capsule 125      18:00: 21:59                          25 doses,           

[place]



     mg        00   :00                           First dose           Medical



                                                  (after           [facility]



                                                  last           



                                                  modificati           



                                                  on) on 22 at           



                                                  1200, Last           



                                                  dose on           



                                                  22 at           



                                                  1200,           



                                                  ASAP<br>Fa           



                                                  culty           



                                                  member           



                                                  approving           



                                                  Non-formul           



                                                  maryanne            



                                                  medication           



                                                  :              



                                                  MEGADC<br&           



                                                  gt;Reason           



                                                  for            



                                                  non-formul           



                                                  maryanne use:           



                                                  SPECIFIC           



                                                  INDICATION           



                                                  FOR            



                                                  NONFORMULA           



                                                  RY             



                                                  PRODUCT<br           



                                                  >Reason           



                                                  for            



                                                  Anti-Infec           



                                                  tive:           



                                                  Documented           



                                                  Infection<           



                                                  br>Documen           



                                                  huma            



                                                  Infection           



                                                  Site:           



                                                  Abdominal<           



                                                  br>Duratio           



                                                  n of           



                                                  Therapy:           



                                                  14 days           

 

     levoFLOXaci            Yes            750mg      750 mg,           Un

[facility]



     n         1-25                               Oral,           ity of



     (LEVAQUIN)      15:00:                               DAILY,           [place]



     tablet 750      00                                 First dose           Med

ical



     mg                                           (after           [facility]



                                                  last           



                                                  modificati           



                                                  on) on 22 at           



                                                  0900,           



                                                  Until           



                                                  Discontinu           



                                                  ed,            



                                                  ASAP<br>Re           



                                                  ason for           



                                                  Anti-Infec           



                                                  tive:           



                                                  Empiric           



                                                  Therapy           



                                                  for            



                                                  Suspected           



                                                  Infection<           



                                                  br>Empiric           



                                                  Therapy           



                                                  Site:           



                                                  Urine<br>D           



                                                  uration of           



                                                  therapy:           



                                                  72 hours           

 

     lactobacill      2022- No        [id number] .5mg      Take 1         

  [facility]



     us        --25                          tablet by           ity of



     acidophilus      00:00: 05:59                          mouth 2           [facility]

[place]



               00   :00                           (two)           [facility]



                                                  times           [facility]



                                                  daily for           



                                                  30 days.           

 

     lactobacill      2022- No        [id number] .5mg      Take 1         

  [facility]



     us        --25                          tablet by           ity of



     acidophilus      00:00: 05:59                          mouth 2           [facility]

[place]



               00   :00                           (two)           [facility]



                                                  times           [facility]



                                                  daily for           



                                                  30 days.           

 

     vancomycin      2022- No        [id number] 125mg      Take 1         

  [facility]



     125 mg                                capsule by           [facility]



     capsule      00:00: 05:59                          mouth 4           [place]



               00   :00                           (four)           Medical



                                                  times           [facility]



                                                  daily for           



                                                  5 days.           

 

     vancomycin      2022- No        [id number] 125mg      Take 1         

  [facility]



     125 mg      -                          capsule by           ity of



     capsule      00:00: 05:59                          mouth 4           [place]



               00   :00                           (four)           Medical



                                                  times           [facility]



                                                  daily for           



                                                  5 days.           

 

     traMADoL            Yes            50mg      50 mg,           [facility]



     (ULTRAM)      1-24                               Oral,           [facility]



     tablet 50      21:26:                               Q6HPRN,           [place]



     mg        10                                 Starting           Medical



                                                  on Mon           [facility]



                                                  22 at           



                                                  1526,           



                                                  Until           



                                                  Discontinu           



                                                  ed,            



                                                  Routine,           



                                                  Pain           



                                                  (scale           



                                                  4-6)           

 

     levoFLOXaci      2022- No             250mg      250 mg,           U

nivers



     n                                   Oral, Q24H           ity of



     (LEVAQUIN)      19:30: 23:18                          ABX, First           

[place]



     tablet 250      00   :17                           dose           Medical



     mg                                           (after           Branch



                                                  last           



                                                  modificati           



                                                  on) on Mon           



                                                  22 at           



                                                  1330,           



                                                  Until           



                                                  Discontinu           



                                                  ed,            



                                                  ASAP<br>Re           



                                                  ason for           



                                                  Anti-Infec           



                                                  tive:           



                                                  Empiric           



                                                  Therapy           



                                                  for            



                                                  Suspected           



                                                  Infection<           



                                                  br>Empiric           



                                                  Therapy           



                                                  Site:           



                                                  Urine<br>D           



                                                  uration of           



                                                  therapy:           



                                                  72 hours           

 

     HYDROmorpho      2022- No             .5mg      0.5 mg,           Un

[place]



     ne                                  Slow IV           ity of



     (DILAUDID)      20:45: 18:23                          Push,           [place]



     injection      00   :44                           Q6HPRN,           Medical



     0.5 mg                                         Starting           Branch



                                                  on Sun           



                                                  22 at           



                                                  1445,           



                                                  Until Mon           



                                                  22 at           



                                                  1223,           



                                                  Routine,           



                                                  Pain           



                                                  (scale           



                                                  7-10),           



                                                  breakthrou           



                                                  gh             



                                                  pain<br>Us           



                                                  e approved           



                                                  by             



                                                  (Faculty):           



                                                  ADC            



                                                  PROVIDER           

 

     oxyCODONE-a            Yes            2{tbl}      2 tablet,          

 [facility]



     cetaminophe                                     Oral,           ity of



     n         20:39:                               Q6HPRN,           [place]



     (PERCOCET)      03                                 Starting           Medic

al



     5-325 mg                                         on Sun           Branch



     per tablet                                         22 at           



     2 tablet                                         1439,           



                                                  Until           



                                                  Discontinu           



                                                  ed,            



                                                  Routine,           



                                                  Pain           



                                                  (scale           



                                                  7-10)           

 

     HYDROmorpho      2022- No             .5mg      0.5 mg,           Un

[place]



     ne                                  Slow IV           ity of



     (DILAUDID)      00:00: 23:41                          Push,           [place]



     injection      00   :00                           ONCE, 1           Medical



     0.5 mg                                         dose, On           Branch



                                                  Sat            



                                                  22 at           



                                                  1800,           



                                                  Routine<br           



                                                  >Use           



                                                  approved           



                                                  by             



                                                  (Faculty):           



                                                  ADC            



                                                  PROVIDER           

 

     ertapenem      2022- No             1000mg      1,000 mg,           

[facility]



     (INVANZ)                                IV             ity of



     1,000 mg in      15:45: 18:30                          Piggyback,          

 [place]



     NaCl 0.9%      00   :00                           Q24H ABX,           Medic

al



     (NS) 50 mL                                         First dose           Bra

[facility]



     MINI-BAG                                         on Sat           



                                                  22 at           



                                                  0945,           



                                                  Until           



                                                  Discontinu           



                                                  ed,            



                                                  Administer           



                                                  over 30           



                                                  Minutes,           



                                                  50             



                                                  mL<br>Reas           



                                                  on for           



                                                  Anti-Infec           



                                                  tive:           



                                                  Empiric           



                                                  Therapy           



                                                  for            



                                                  Suspected           



                                                  Infection<           



                                                  br>Empiric           



                                                  Therapy           



                                                  Site:           



                                                  Urine<br>D           



                                                  uration of           



                                                  therapy:           



                                                  72             



                                                  hours<br>R           



                                                  estricted           



                                                  use            



                                                  approved           



                                                  by: ADC           



                                                  PROVIDER           

 

     lactobacill            Yes            .5mg      0.5 mg,           Uni

vers



     us                                       Oral, BID,           ity of



     acidophilus      02:00:                               First dose           

[place]



     tablet 0.5      00                                 on Fri           Medical



     mg                                           22 at           Branch



                                                  ,           



                                                  Until           



                                                  Discontinu           



                                                  ed,            



                                                  Routine           

 

     vancomycin      2022- No             250mg      250 mg,           Un

[place]



     (VANCOCIN)                                Oral, QID,           it

y of



     capsule 250      02:00: 16:20                          First dose          

 [place]



     mg        00   :40                           (after           Medical



                                                  last           Branch



                                                  reorder)           



                                                  on Fri           



                                                  22 at           



                                                  2000,           



                                                  Until           



                                                  Discontinu           



                                                  ed,            



                                                  ASAP&lt;br           



                                                  >Faculty           



                                                  member           



                                                  approving           



                                                  Non-formul           



                                                  maryanne            



                                                  medication           



                                                  :              



                                                  MEGADC<br>           



                                                  Reason for           



                                                  non-formul           



                                                  maryanne use:           



                                                  SPECIFIC           



                                                  INDICATION           



                                                  FOR            



                                                  NONFORMULA           



                                                  RY             



                                                  PRODUCT<br           



                                                  >Reason           



                                                  for            



                                                  Anti-Infec           



                                                  tive:           



                                                  Documented           



                                                  Infection<           



                                                  br>Documen           



                                                  huma            



                                                  Infection           



                                                  Site:           



                                                  Abdominal<           



                                                  br>Duratio           



                                                  n of           



                                                  Therapy:           



                                                  14 days           

 

     vancomycin      2022- No             250mg      250 mg,           Un

[place]



     (VANCOCIN)                                Oral,           ity of



     capsule 250      00:45: 00:34                          ONCE, 1           [place]

[place]



     mg        00   :00                           dose, On           Medical



                                                  Fri            Branch



                                                  22 at           



                                                  1845,           



                                                  ASAP<br>Fa           



                                                  culty           



                                                  member           



                                                  approving           



                                                  Non-formul           



                                                  maryanne            



                                                  medication           



                                                  :              



                                                  MEGADC<br>           



                                                  Reason for           



                                                  non-formul           



                                                  maryanne use:           



                                                  SPECIFIC           



                                                  INDICATION           



                                                  FOR            



                                                  NONFORMULA           



                                                  RY             



                                                  PRODUCT<br           



                                                  >Reason           



                                                  for            



                                                  Anti-Infec           



                                                  tive:           



                                                  Documented           



                                                  Infection<           



                                                  br>Documen           



                                                  huma            



                                                  Infection           



                                                  Site:           



                                                  Abdominal<           



                                                  br>Duratio           



                                                  n of           



                                                  Therapy:           



                                                  14 days           

 

     lidocaine      2022- No             5mL       5 mL,           Univer

s



     1% (PF)                                Subcutaneo           ity o

f



     (XYLOCAINE)      23:45: 02:25                          us, ONCE,           

[place]



     injection 5      00   :00                           1 dose, On           Me

dical



     mL                                           Fri            Branch



                                                  22 at           



                                                  1745,           



                                                  Routine           

 

     NaCl 0.9%            Yes            10mL      10 mL,           Univer

s



     (NS)                                     Slow IV           ity of



     injection      23:38:                               Push, PRN,           [place]

[place]



     10 mL      41                                 Starting           Medical



                                                  on Fri           Branch



                                                  22 at           



                                                  1738,           



                                                  Until           



                                                  Discontinu           



                                                  ed,            



                                                  Routine,           



                                                  line           



                                                  maintenanc           



                                                  e              

 

     meropenem      2022- No             1g        1 g, IV           Univ

ers



     (MERREM)                           Piggyback,           it

y of



     g in NaCl      23:15: 14:33                          Q8H ABX,           Eric

as



     0.9% (NS)      00   :35                           First dose           Medi

[place]



     100 mL                                         (after           Branch



     MINI-BAG                                         last           



                                                  modificati           



                                                  on) on u           



                                                  22 at           



                                                  1715,           



                                                  Until           



                                                  Discontinu           



                                                  ed,            



                                                  Administer           



                                                  over 60           



                                                  Minutes,           



                                                  100            



                                                  mL<br>Rest           



                                                  ricted use           



                                                  approved           



                                                  by: ADC           



                                                  PROVIDER<b           



                                                  r&gt;Reaso           



                                                  n for           



                                                  Anti-Infec           



                                                  tive:           



                                                  Documented           



                                                  Infection<           



                                                  br>Documen           



                                                  huma            



                                                  Infection           



                                                  Site:           



                                                  Urine<br>D           



                                                  uration of           



                                                  Therapy: 7           



                                                  days           

 

     enoxaparin            Yes            40mg      40 mg,           Unive

rs



     (LOVENOX)                                     Subcutaneo           ity 

of



     injection      23:00:                               us, DAILY,           Te

[place]



     40 mg      00                                 First dose           Medical



                                                  on            Branch



                                                  22 at           



                                                  1700,           



                                                  Until           



                                                  Discontinu           



                                                  ed,            



                                                  Routine           

 

     meropenem      2022- No             1g        1 g, IV           Univ

ers



     (MERREM)                           Piggyback,           it

y of



     g in NaCl      16:00: 20:33                          Q8H ABX,           Eric

as



     0.9% (NS)      00   :04                           First dose           Medi

[place]



     100 mL                                         (after           Branch



     MINI-BAG                                         last           



                                                  reorder)           



                                                  on u           



                                                  22 at           



                                                  1000,           



                                                  Until           



                                                  Discontinu           



                                                  ed,            



                                                  Administer           



                                                  over 60           



                                                  Minutes,           



                                                  100            



                                                  mL<br>Rest           



                                                  ricted use           



                                                  approved           



                                                  by: ADC           



                                                  PROVIDER<b           



                                                  r>Reason           



                                                  for            



                                                  Anti-Infec           



                                                  tive:           



                                                  Documented           



                                                  Infection<           



                                                  br>Documen           



                                                  huma            



                                                  Infection           



                                                  Site:           



                                                  Urine<br>D           



                                                  uration of           



                                                  Therapy:           



                                                  Other (see           



                                                  Comments)           

 

     HYDROmorpho      2022- No             .5mg      0.5 mg,           Un

[place]



     ne                                  Slow IV           ity of



     (DILAUDID)      14:29: 20:41                          Push,           [place]



     injection      58   :17                           Q4HPRN,           Medical



     0.5 mg                                         Starting           Branch



                                                  on u           



                                                  22 at           



                                                  0829,           



                                                  Until Sun           



                                                  22 at           



                                                  1441,           



                                                  Routine,           



                                                  Pain           



                                                  (scale           



                                                  7-10)<br>U           



                                                  se             



                                                  approved           



                                                  by             



                                                  (Faculty):           



                                                  ADC            



                                                  PROVIDER           

 

     proMETHazin            Yes            25mg      25 mg,           Univ

ers



     e                                        Oral,           ity of



     (PHENERGAN)      09:42:                               Q4HPRN,           Eric

as



     tablet 25      07                                 Starting           [facility]

l



     mg                                           on Thu           [facility]



                                                  22 at           



                                                  0342,           



                                                  Until           



                                                  Discontinu           



                                                  ed,            



                                                  Routine,           



                                                  Nausea and           



                                                  Vomiting           



                                                  (N/V)           

 

     HYDROmorpho      2022- No             .5mg      0.5 mg,           Un

[facility]



     ne                                  Slow IV           ity of



     (DILAUDID)      09:41: 12:04                          Push,           [place]



     injection      36   :38                           Q4HPRN,           Medical



     0.5 mg                                         Starting           [facility]



                                                  on u           



                                                  22 at           



                                                  0341,           



                                                  Until u           



                                                  22 at           



                                                  0604,           



                                                  Routine,           



                                                  Pain           



                                                  (scale           



                                                  7-10)<br>U           



                                                  se             



                                                  approved           



                                                  by             



                                                  (Faculty):           



                                                  ADC            



                                                  PROVIDER           

 

     proCHLORper            Yes            10mg      10 mg,           Univ

ers



     azine                                     Slow IV           ity of



     (COMPAZINE)      09:07:                               Push,           [place]



     injection      27                                 Q6HPRN,           Medical



     10 mg                                         Starting           [facility]



                                                  on u           



                                                  22 at           



                                                  0307,           



                                                  Until           



                                                  Discontinu           



                                                  ed,            



                                                  Routine,           



                                                  Nausea and           



                                                  Vomiting           



                                                  (N/V)           

 

     acetaminoph            Yes            650mg      650 mg,           Un

[facility]



     en                                       Oral,           ity of



     (TYLENOL)      09:07:                               Q6HPRN,           [place]



     tablet 650      10                                 Starting           Medic

al



     mg                                           on Thu           [facility]



                                                  22 at           



                                                  0307,           



                                                  Until           



                                                  Discontinu           



                                                  ed,            



                                                  Routine,           



                                                  Pain           



                                                  (scale           



                                                  1-3)           

 

     meropenem      2022- No             1g        1 g, IV           Univ

ers



     (MERREM)                           Piggyback,           it

y of



     g in NaCl      07:15: 08:49                          ONCE, 1           [place]

[facility]



     0.9% (NS)      00   :00                           dose, On           [facility]

l



     100 mL                                         Thu            [facility]



     MINI-BAG                                         22 at           



                                                  0115,           



                                                  Administer           



                                                  over 60           



                                                  Minutes,           



                                                  100            



                                                  mL<br>Rest           



                                                  ricted use           



                                                  approved           



                                                  by: [facility]           



                                                  PROVIDER<b           



                                                  r>Reason           



                                                  for            



                                                  Anti-Infec           



                                                  tive:           



                                                  Documented           



                                                  Infection<           



                                                  br>Documen           



                                                  huma            



                                                  Infection           



                                                  Site:           



                                                  Urine<br>D           



                                                  uration of           



                                                  Therapy:           



                                                  Other (see           



                                                  Comments)           

 

     cefdinir      -2022- No             300mg      300 mg,           Univ

ers



     (OMNICEF)                                Oral,           ity of



     capsule 300      03:30: 02:55                          ONCE, 1           [facility]

[place]



     mg        00   :00                           dose, On           Medical



                                                  Mon            [facility]



                                                  22 at           



                                                  2130,           



                                                  ASAP<br>Re           



                                                  ason for           



                                                  Anti-Infec           



                                                  tive:           



                                                  Documented           



                                                  Infection<           



                                                  br>Documen           



                                                  huma            



                                                  Infection           



                                                  Site:           



                                                  Urine<br>D           



                                                  uration of           



                                                  Therapy: 7           



                                                  days           

 

     FENTanyl PF      -2022- No             50ug      50 mcg,           Un

[facility]



     (SUBLIMAZE                                Slow IV           ity o

f



     (PF))      01:15: 03:26                          Push,           [place]



     injection      00   :00                           ONCE, 1           Medical



     50 mcg                                         dose, On           [facility]



                                                  Mon            



                                                  22 at           



                                                  1915, STAT           

 

     proMETHazin      2022- No             25mg      25 mg, IV           

[facility]



     e                                   Piggyback,           ity of



     (PHENERGAN)      01:15: 03:26                          ONCE, 1           [facility]

[facility]



     25 mg in      00   :00                           dose, On           Medical



     NaCl 0.9%                                         Mon            [facility]



     (NS) 50 mL                                         22 at           



     piggyback                                         1915, 50           



                                                  mL             

 

     cefdinir      -0 - No        [id number] 300mg      Take 1           U

n[facility]



     300 mg                                capsule by           ity of



     capsule      00:00: 05:59                          mouth           [place]



               00   :00                           every 12           Medical



                                                  (twelve)           [facility]



                                                  hours for           



                                                  10 days.           

 

     cefdinir      -0 2022- No        [id number] 300mg      Take 1           U

nivers



     300 mg                                capsule by           ity of



     capsule      00:00: 00:00                          mouth           [place]



               00   :00                           every 12           Medical



                                                  (twelve)           [facility]



                                                  hours for           



                                                  10 days.           

 

     FENTanyl PF      -2022- No             50ug      50 mcg,           Un

[facility]



     (SUBLIMAZE                                Slow IV           ity o

f



     (PF))      02:00: 01:19                          Push,           [place]



     injection      00   :00                           ONCE, 1           Medical



     50 mcg                                         dose, On           [facility]



                                                  Sat            



                                                  1/15/22 at           



                                                  2000,           



                                                  Routine           

 

     methocarbam      2022- No             1000mg      1,000 mg,         

  [facility]



     oL                                  Oral,           ity of



     (ROBAXIN)      02:00: 01:19                          ONCE, 1           [place]

[facility]



     tablet      00   :00                           dose, On           Medical



     1,000 mg                                         Sat            [facility]



                                                  1/15/22 at           



                                                  2000, ASAP           

 

     proMETHazin      2022- No             12.5mg      12.5 mg,          

 [facility]



     e         1-15 01-15                          IV             ity of



     (PHENERGAN)      22:46: 23:00                          Piggyback,          

 [place]



     12.5 mg in      00   :00                           ONCE, 1           [facility]

l



     NaCl 0.9%                                         dose, On           [facility]



     (NS) 50 mL                                         Sat            



     piggyback                                         1/15/22 at           



                                                  1700, 50           



                                                  mL             

 

     FENTanyl PF      2022- No             50ug      50 mcg,           Un

[facility]



     (SUBLIMAZE      1-15 01-15                          Slow IV           ity o

f



     (PF))      22:45: 23:00                          Push,           [place]



     injection      00   :00                           ONCE, 1           Medical



     50 mcg                                         dose, On           [facility]



                                                  Sat            



                                                  1/15/22 at           



                                                  1645,           



                                                  Routine           

 

     methocarbam      0      Yes       [id number] 1000mg      Take 2         

  [facility]



     oL 500 mg      1-15                               tablets by           ity 

of



     tablet      00:00:                               mouth 4           [place]



               00                                 (four)           Medical



                                                  times           [facility]



                                                  daily as           



                                                  needed for           



                                                  Pain           



                                                  (scale           



                                                  1-3).           

 

     methocarbam      -0      Yes       [id number] 1000mg      Take 2         

  [facility]



     oL 500 mg      1-15                               tablets by           ity 

of



     tablet      00:00:                               mouth 4           [place]



               00                                 (four)           Medical



                                                  times           [facility]



                                                  daily as           



                                                  needed for           



                                                  Pain           



                                                  (scale           



                                                  1-3).           

 

     methocarbam      -0      Yes       [id number] 1000mg      Take 2         

  [facility]



     oL 500 mg      1-15                               tablets by           ity 

of



     tablet      00:00:                               mouth 4           [place]



               00                                 (four)           Medical



                                                  times           [facility]



                                                  daily as           



                                                  needed for           



                                                  Pain           



                                                  (scale           



                                                  1-3).           

 

     methocarbam      2022-0      Yes       [id number] 1000mg      Take 2         

  [facility]



     oL 500 mg      1-15                               tablets by           ity 

of



     tablet      00:00:                               mouth 4           [place]



               00                                 (four)           Medical



                                                  times           [facility]



                                                  daily as           



                                                  needed for           



                                                  Pain           



                                                  (scale           



                                                  1-3).           

 

     methocarbam      -0      Yes       [id number] 1000mg      Take 2         

  [facility]



     oL 500 mg      1-15                               tablets by           ity 

of



     tablet      00:00:                               mouth 4           [place]



               00                                 (four)           Medical



                                                  times           [facility]



                                                  daily as           



                                                  needed for           



                                                  Pain           



                                                  (scale           



                                                  1-3).           

 

     methocarbam      0      Yes       [id number] 1000mg      Take 2         

  [facility]



     oL 500 mg      1-15                               tablets by           ity 

of



     tablet      00:00:                               mouth 4           [place]



               00                                 (four)           Medical



                                                  times           [facility]



                                                  daily as           



                                                  needed for           



                                                  Pain           



                                                  (scale           



                                                  1-3).           

 

     methocarbam      0 - No        [id number] 1000mg      Take 2        

   [facility]



     oL 500 mg      1-15 05-11                          tablets by           ity

 of



     tablet      00:00: 00:00                          mouth 4           [place]



               00   :00                           (four)           Medical



                                                  times           [facility]



                                                  daily as           



                                                  needed for           



                                                  Pain           



                                                  (scale           



                                                  1-3).           

 

     proMETHazin      2021 No             25mg      25 mg, IV           

[facility]



     e         -24                           Piggyback,           ity of



     (PHENERGAN)      23:15: 22:20                          ONCE, 1           [facility]

[facility]



     25 mg in      00   :00                           dose, On           Medical



     NaCl 0.9%                                         Wed            [facility]



     (NS) 50 mL                                         21           



     piggyback                                         at 1715,           



                                                  50 mL           

 

     FENTanyl PF      2021 No             75ug      75 mcg,           Un

[facility]



     (SUBLIMAZE                                Slow IV           ity o

f



     (PF))      22:15: 22:20                          Push,           [place]



     injection      00   :00                           ONCE, 1           Medical



     75 mcg                                         dose, On           [facility]



                                                  Wed            



                                                  21           



                                                  at 1615,           



                                                  Routine           

 

     fidaxomicin      2021      Yes       [id number] 200mg      Take 1          

 [facility]



     200 mg      1-24                               tablet by           ity of



     tablet      00:00:                               mouth 2           [place]



               00                                 (two)           Medical



                                                  times           [facility]



                                                  daily.           

 

     proMETHazin      2021      Yes       [id number] 25mg      Take 1           

[facility]



     e 25 mg      1-24                               tablet by           ity of



     tablet      00:00:                               mouth           [place]



               00                                 every 6           Medical



                                                  (six)           [facility]



                                                  hours as           



                                                  needed for           



                                                  Nausea and           



                                                  Vomiting           



                                                  (N/V).           

 

     fidaxomicin      2021      Yes       [id number] 200mg      Take 1          

 [facility]



     200 mg      1-24                               tablet by           ity of



     tablet      00:00:                               mouth 2           [place]



               00                                 (two)           Medical



                                                  times           [facility]



                                                  daily.           

 

     proMETHazin      2021      Yes       [id number] 25mg      Take 1           

[facility]



     e 25 mg      1-24                               tablet by           ity of



     tablet      00:00:                               mouth           [place]



               00                                 every 6           Medical



                                                  (six)           [facility]



                                                  hours as           



                                                  needed for           



                                                  Nausea and           



                                                  Vomiting           



                                                  (N/V).           

 

     cholestyram      2021      Yes                                     [facility]

s



     ine 4 gram      1-24                                              ity of



     packet      00:00:                                              [place]



               00                                                Medical



                                                                 [facility]

 

     fidaxomicin      2021      Yes       [id number] 200mg      Take 1          

 [facility]



     200 mg      1-24                               tablet by           ity of



     tablet      00:00:                               mouth 2           [place]



               00                                 (two)           Medical



                                                  times           [facility]



                                                  daily.           

 

     proMETHazin      2021      Yes       [id number] 25mg      Take 1           

[facility]



     e 25 mg      1-24                               tablet by           [facility] of



     tablet      00:00:                               mouth           [place]



               00                                 every 6           Medical



                                                  (six)           [facility]



                                                  hours as           



                                                  needed for           



                                                  Nausea and           



                                                  Vomiting           



                                                  (N/V).           

 

     cholestyram      2021      Yes                                     [facility]

s



     ine 4 gram      1-24                                              ity of



     packet      00:00:                                              [place]



               00                                                Medical



                                                                 [facility]

 

     cholestyram      2021      Yes                                     [facility]

s



     ine 4 gram      1-24                                              ity of



     packet      00:00:                                              [place]



               00                                                Medical



                                                                 [facility]

 

     cholestyram      2021      Yes                                     [facility]

s



     ine 4 gram      1-24                                              ity of



     packet      00:00:                                              [place]



               00                                                Medical



                                                                 [facility]

 

     cholestyram      2021      Yes                                     [facility]

s



     ine 4 gram      1-24                                              ity of



     packet      00:00:                                              [place]



               00                                                Medical



                                                                 [facility]

 

     cholestyram      2021      Yes                                     Un[facility]

s



     ine 4 gram      1-24                                              ity of



     packet      00:00:                                              [place]



               00                                                Medical



                                                                 [facility]

 

     fidaxomicin      2021      Yes       [id number] 200mg      Take 1          

 [facility]



     200 mg      1-24                               tablet by           [facility]



     tablet      00:00:                               mouth 2           [place]



               00                                 (two)           Medical



                                                  times           [facility]



                                                  daily.           

 

     proMETHazin      2021      Yes       [id number] 25mg      Take 1           

[facility]



     e 25 mg      1-24                               tablet by           [facility]



     tablet      00:00:                               mouth           [place]



               00                                 every 6           Medical



                                                  (six)           [facility]



                                                  hours as           



                                                  needed for           



                                                  Nausea and           



                                                  Vomiting           



                                                  (N/V).           

 

     cholestyram      2021- No                                      [facility]

rs



     ine 4 gram      1-24 05-11                                         ity of



     packet      00:00: 00:00                                         [place]



               00   :00                                          Medical



                                                                 [facility]anch

 

     fidaxomicin      2021- No        [id number] 200mg      Take 1         

  [facility]



     200 mg      1-24 01-25                          tablet by           ity of



     tablet      00:00: 00:00                          mouth 2           [place]



               00   :00                           (two)           Medical



                                                  times           [facility]



                                                  daily.           

 

     proMETHazin      2021- No        [id number] 25mg      Take 1          

 [facility]



     e 25 mg                                tablet by           ity [facility]



     tablet      00:00: 00:00                          mouth           [place]



               00   :00                           every 6           Medical



                                                  (six)           [facility]



                                                  hours as           



                                                  needed for           



                                                  Nausea and           



                                                  Vomiting           



                                                  (N/V).           

 

     FENTanyl PF       No             50ug      50 mcg,           Un

[facility]



     (SUBLIMAZE      5-10 05-10                          Intravenou           it

y of



     (PF))      02:45: 01:34                          s, ONCE, 1           [place]



     injection      00   :00                           dose, Sun           Medic

al



     50 mcg                                         21 at           Branch



                                                  2145,           



                                                  Routine           

 

     cefTRIAXone       No             1000mg      1,000 mg,         

  [facility]



     (ROCEPHIN)      5-10 05-10                          IV             ity of



     1,000 mg in      02:30: 01:55                          Piggyback,          

 [place]



     NaCl 0.9%      00   :00                           ONCE, 1           Medical



     (NS) 50 mL                                         dose, [facility]



     MINI-BAG                                         21 at           



                                                  2130, 50           



                                                  mL<br>Reas           



                                                  on for           



                                                  Anti-Infec           



                                                  tive:           



                                                  Documented           



                                                  Infection<           



                                                  br>Documen           



                                                  huma            



                                                  Infection           



                                                  Site:           



                                                  Urine<br>D           



                                                  uration of           



                                                  Therapy: 7           



                                                  days           

 

     famotidine       No             20mg      20 mg,           Univ

ers



     (PEPCID      5-10 05-10                          Slow IV           ity of



     (PF))      01:00: 00:12                          Push,           [place]



     injection      00   :00                           ONCE, 1           Medical



     20 mg                                         dose, [facility]



                                                  21 at           



                                                  2000, ASAP           

 

     proMETHazin      2021- No             25mg      25 mg, IV           

[facility]



     e         5-10 05-10                          Piggyback,           ity of



     (PHENERGAN)      00:45: 00:11                          ONCE, 1           [facility]

[place]



     25 mg in      00   :00                           dose, [place]           Medica

l



     NaCl 0.9%                                         21 at           [facility]

h



     (NS) 50 mL                                         1945, 50           



     piggyback                                         mL             

 

     FENTanyl PF      2021- No             50ug      50 mcg,           Un

[facility]



     (SUBLIMAZE      5-10 05-10                          Intravenou           it

y of



     (PF))      00:45: 00:12                          s, ONCE, 1           [place]



     injection      00   :00                           dose, Sun           Medic

al



     50 mcg                                         21 at           Branch



                                                  1945,           



                                                  Routine           

 

     NaCl 0.9%      2021- No             1000mL      at 999           Uni

vers



     (NS) bolus      5-10 05-10                          mL/hr,           ity of



     infusion      00:00: 01:47                          1,000 mL,           Eric

as



     1,000 mL      00   :00                           IV             Medical



                                                  Infusion,           [facility]



                                                  ONCE, 1           



                                                  dose, Sun           



                                                  21 at           



                                                  1900, ASAP           

 

     cefdinir      2021- No        [id number] 300mg      Take 1           U

nivers



     300 mg       05-20                          capsule by           ity of



     capsule      00:00: 04:59                          mouth 2           [place]



               00   :00                           (two)           Medical



                                                  times           [facility]



                                                  daily for           



                                                  10 days.           

 

     buPROPion       No             150mg QD   Take 1           [facility]



     (WELLBUTRIN      1-17 01-16                          tablet           [facility]



     XL) 150 MG      00:00: 23:59                          (150 mg           Med

ical



     24 hr      00   :00                           total) by           Center



     tablet                                         mouth           



                                                  daily.           

 

     citalopram       No             40mg QD   Take 1           [facility]



     (CELEXA) 40      1-17 -16                          tablet (40           [facility]

[facility]



     MG tablet      00:00: 23:59                          mg total)           Me

dical



               00   :00                           by mouth           Center



                                                  daily.           

 

     oxyCODONE-a            Yes            1{tbl}      Take 1           [facility]



     cetaminophe      1-16                               tablet by           Ni

es



     n         14:44:                               mouth           Medical



     (PERCOCET)      50                                 every 4           Center



      mg                                         (four)           



     per tablet                                         hours as           



                                                  needed for           



                                                  Pain.           

 

     oxyCODONE-a            Yes            1{tbl}      Take 1           [facility]



     cetaminophe      1-16                               tablet by           Ni

es



     n         14:44:                               mouth           Medical



     (PERCOCET)      50                                 every 4           Center



      mg                                         (four)           



     per tablet                                         hours as           



                                                  needed for           



                                                  Pain.           

 

     oxyCODONE-a            Yes            1{tbl}      Take 1           [facility]



     cetaminophe      1-16                               tablet by           Ni

es



     n         14:44:                               mouth           Medical



     (PERCOCET)      50                                 every 4           Center



      mg                                         (four)           



     per tablet                                         hours as           



                                                  needed for           



                                                  Pain.           

 

     oxyCODONE-a            Yes            1{tbl}      Take 1           [facility]



     cetaminophe      1-16                               tablet by           Ni

es



     n         14:44:                               mouth           Medical



     (PERCOCET)      50                                 every 4           Center



      mg                                         (four)           



     per tablet                                         hours as           



                                                  needed for           



                                                  Pain.           

 

     oxyCODONE-a      2020-0      Yes            1{tbl}      Take 1           [facility]



     cetaminophe      1-16                               tablet by           Ni

es



     n         14:44:                               mouth           Medical



     (PERCOCET)      50                                 every 4           Center



      mg                                         (four)           



     per tablet                                         hours as           



                                                  needed for           



                                                  Pain.           

 

     oxyCODONE-a      2020-0      Yes            1{tbl}      Take 1           [facility]



     cetaminophe      1-16                               tablet by           Ni

es



     n         14:44:                               mouth           Medical



     (PERCOCET)      50                                 every 4           Center



      mg                                         (four)           



     per tablet                                         hours as           



                                                  needed for           



                                                  Pain.           

 

     oxyCODONE-a      2020-0      Yes            1{tbl}      Take 1           [facility]



     cetaminophe      1-16                               tablet by           Ni

es



     n         14:44:                               mouth           Medical



     (PERCOCET)      50                                 every 4           Center



      mg                                         (four)           



     per tablet                                         hours as           



                                                  needed for           



                                                  Pain.           

 

     oxyCODONE-a      2020-0      Yes            1{tbl}      Take 1           [facility]



     cetaminophe      1-16                               tablet by           Ni

es



     n         14:44:                               mouth           Medical



     (PERCOCET)      50                                 every 4           Center



      mg                                         (four)           



     per tablet                                         hours as           



                                                  needed for           



                                                  Pain.           

 

     zinc      2020-0      Yes            5g   Q.5D Apply 5 g           [facility]



     oxide-yuan      1-16                               topically           Ni

es



     latum      00:00:                               2 (two)           Medical



     (CRITIC-AID      00                                 times           Center



     ) 20-51 %                                         daily.           



     Pste                                                        



     topical                                                        



     paste                                                        

 

     meropenem      2020-0      Yes            1g        Inject 1 g           [facility]



     (MERREM)      1-16                               intravenou           [facility]



     MBP 1 gm in      00:00:                               sly every           M

edical



     100 mL NS      00                                 8 (eight)           [facility]

r



                                                  hours.           

 

     insulin      2020-0      Yes            58U  Q.5D Inject 58           [facility]

[facility]



     glargine      1-16                               Units           [facility]



     (LANTUS)      00:00:                               subcutaneo           Med

ical



     100 unit/mL      00                                 usly 2           Center



     injection                                         (two)           



                                                  times           



                                                  daily Use           



                                                  as             



                                                  directed.           

 

     zinc      2020-0      Yes            5g   Q.5D Apply 5 g           [facility]



     oxide-yuan      1-16                               topically           Ni

es



     latum      00:00:                               2 (two)           Medical



     (CRITIC-AID      00                                 times           Center



     ) 20-51 %                                         daily.           



     Pste                                                        



     topical                                                        



     paste                                                        

 

     meropenem      2020-0      Yes            1g        Inject 1 g           [facility]



     (MERREM)      1-16                               intravenou           [facility]



     MBP 1 gm in      00:00:                               sly every           M

edical



     100 mL NS      00                                 8 (eight)           [facility]

r



                                                  hours.           

 

     insulin      2020-0      Yes            58U  Q.5D Inject 58           [facility]

t



     glargine      1-16                               Units           [facility]



     (LANTUS)      00:00:                               subcutaneo           Med

ical



     100 unit/mL      00                                 usly 2           Center



     injection                                         (two)           



                                                  times           



                                                  daily Use           



                                                  as             



                                                  directed.           

 

     zinc      2020-0      Yes            5g   Q.5D Apply 5 g           [facility]



     oxide-yuan      1-16                               topically           Ni

es



     latum      00:00:                               2 (two)           Medical



     (CRITIC-AID      00                                 times           Center



     ) 20-51 %                                         daily.           



     Pste                                                        



     topical                                                        



     paste                                                        

 

     meropenem      2020-0      Yes            1g        Inject 1 g           [facility]



     (MERREM)      1-16                               intravenou           [facility]



     MBP 1 gm in      00:00:                               sly every           M

edical



     100 mL NS      00                                 8 (eight)           [facility]

r



                                                  hours.           

 

     insulin      2020-0      Yes            58U  Q.5D Inject 58           [facility]

t



     glargine      1-16                               Units           [facility]



     (LANTUS)      00:00:                               subcutaneo           Med

ical



     100 unit/mL      00                                 usly 2           Center



     injection                                         (two)           



                                                  times           



                                                  daily Use           



                                                  as             



                                                  directed.           

 

     zinc      2020-0      Yes            5g   Q.5D Apply 5 g           [facility]



     oxide-yuan      1-16                               topically           Ni

es



     latum      00:00:                               2 (two)           Medical



     (CRITIC-AID      00                                 times           Center



     ) 20-51 %                                         daily.           



     Pste                                                        



     topical                                                        



     paste                                                        

 

     meropenem      2020-0      Yes            1g        Inject 1 g           [facility]



     (MERREM)      1-16                               intravenou           [facility]



     MBP 1 gm in      00:00:                               sly every           M

edical



     100 mL NS      00                                 8 (eight)           [facility]

r



                                                  hours.           

 

     insulin      2020-0      Yes            58U  Q.5D Inject 58           [facility]

t



     glargine      1-16                               Units           [facility]



     (LANTUS)      00:00:                               subcutaneo           Med

ical



     100 unit/mL      00                                 usly 2           Center



     injection                                         (two)           



                                                  times           



                                                  daily Use           



                                                  as             



                                                  directed.           

 

     zinc      2020-0      Yes            5g   Q.5D Apply 5 g           [facility]



     oxide-yuan      1-16                               topically           Ni

es



     latum      00:00:                               2 (two)           Medical



     (CRITIC-AID      00                                 times           Center



     ) 20-51 %                                         daily.           



     Pste                                                        



     topical                                                        



     paste                                                        

 

     meropenem      2020-0      Yes            1g        Inject 1 g           [facility]



     ([facility]REM)      1-16                               intravenou           [facility]



     MBP 1 gm in      00:00:                               sly every           [facility]

[facility]



     100 mL NS      00                                 8 (eight)           [facility]

r



                                                  hours.           

 

     insulin      2020-0      Yes            58U  Q.5D Inject 58           [facility]

t



     glargine      1-16                               Units           [facility]



     (LANTUS)      00:00:                               subcutaneo           [facility]

ical



     100 unit/mL      00                                 usly 2           [facility]



     injection                                         (two)           



                                                  times           



                                                  daily Use           



                                                  as             



                                                  directed.           

 

     zinc      2020-0      Yes            5g   Q.5D Apply 5 g           [facility]



     oxide-yuan      1-16                               topically           [facility]

[facility]



     latum      00:00:                               2 (two)           [facility]



     (CRITIC-AID      00                                 times           [facility]



     ) 20-51 %                                         daily.           



     Pste                                                        



     topical                                                        



     paste                                                        

 

     meropenem      2020-0      Yes            1g        Inject 1 g           [facility]



     ([facility]REM)      1-16                               intravenou           [facility]



     MBP 1 gm in      00:00:                               sly every           [facility]

[facility]



     100 mL NS      00                                 8 (eight)           [facility]

r



                                                  hours.           

 

     insulin      2020-0      Yes            58U  Q.5D Inject 58           [facility]

t



     glargine      1-16                               Units           [facility]



     (LANTUS)      00:00:                               subcutaneo           [facility]

ical



     100 unit/mL      00                                 usly 2           [facility]



     injection                                         (two)           



                                                  times           



                                                  daily Use           



                                                  as             



                                                  directed.           

 

     zinc      2020-0      Yes            5g   Q.5D Apply 5 g           [facility]



     oxide-yuan      1-16                               topically           [facility]

[facility]



     latum      00:00:                               2 (two)           [facility]



     (CRITIC-AID      00                                 times           [facility]



     ) 20-51 %                                         daily.           



     Pste                                                        



     topical                                                        



     paste                                                        

 

     meropenem      2020-0      Yes            1g        Inject 1 g           [facility]



     ([facility]REM)      1-16                               intravenou           [facility]



     MBP 1 gm in      00:00:                               sly every           [facility]

[facility]



     100 mL NS      00                                 8 (eight)           [facility]

r



                                                  hours.           

 

     insulin      2020-0      Yes            58U  Q.5D Inject 58           [facility]

t



     glargine      1-16                               Units           [facility]



     (LANTUS)      00:00:                               subcutaneo           Med

ical



     100 unit/mL      00                                 usly 2           [facility]



     injection                                         (two)           



                                                  times           



                                                  daily Use           



                                                  as             



                                                  directed.           

 

     zinc      2020-0      Yes            5g   Q.5D Apply 5 g           [facility]



     oxide-yuan      1-16                               topically           [facility]

[facility]



     latum      00:00:                               2 (two)           [facility]



     (CRITIC-AID      00                                 times           [facility]



     ) 20-51 %                                         daily.           



     Pste                                                        



     topical                                                        



     paste                                                        

 

     meropenem      2020-0      Yes            1g        Inject 1 g           [facility]



     ([facility]REM)      1-16                               intravenou           [facility]



     MBP 1 gm in      00:00:                               sly every           [facility]

[facility]



     100 mL NS      00                                 8 (eight)           [facility]

r



                                                  hours.           

 

     insulin            Yes            58U  Q.5D Inject 58           [facility]

t



     glargine      1-16                               Units           [facility]



     (LANTUS)      00:00:                               subcutaneo           Med

ical



     100 unit/mL      00                                 usly 2           [facility]



     injection                                         (two)           



                                                  times           



                                                  daily Use           



                                                  as             



                                                  directed.           

 

     insulin      2021- No             0U        Inject           [facility]



     lispro      1-16 01-15                          0-12 Units           [facility]



     (HUMALOG)      00:00: 23:59                          subcutaneo           [facility]

[facility]



     100 unit/mL      00   :00                           usly 3           [facility]



     injection                                         (three)           



                                                  times           



                                                  daily           



                                                  before           



                                                  meals.           

 

     insulin      2021- No             25U       Inject 25           [facility]



     lispro      -16 01-15                          Units           [facility]



     (HUMALOG)      00:00: 23:59                          subcutaneo           [facility]

ed[facility]



     100 unit/mL      00   :00                           usly 3           [facility]



     injection                                         (three)           



                                                  times           



                                                  daily           



                                                  before           



                                                  meals.           

 

     normal      2018      No                       1,000 mL,           [facility]

a



     saline 0.9%      1-08                               Rate: 100           l



     IV 1,000 mL      11:04:                               ml/hr,           [facility]

[facility]



               00                                 Infuse           



                                                  over: 10           



                                                  hr, Route:           



                                                  IV, Dosing           



                                                  Weight           



                                                  131.818           



                                                  kg, Total           



                                                  Volume:           



                                                  1,000,           



                                                  Start           



                                                  date:           



                                                  18           



                                                  5:04:00           



                                                  CST,           



                                                  Duration:           



                                                  30 day,           



                                                  Stop date:           



                                                  18           



                                                  5:03:00           



                                                  CST, 2.4,           



                                                  m2             

 

     normal      2018      No                       1,000 mL,           [facility]

a



     saline 0.9%      1-08                               Rate: 100           l



     IV 1,000 mL      11:04:                               ml/hr,           [facility]

[facility]



               00                                 Infuse           



                                                  over: 10           



                                                  hr, Route:           



                                                  IV, Dosing           



                                                  Weight           



                                                  131.818           



                                                  kg, Total           



                                                  Volume:           



                                                  1,000,           



                                                  Start           



                                                  date:           



                                                  18           



                                                  5:04:00           



                                                  CST,           



                                                  Duration:           



                                                  30 day,           



                                                  Stop date:           



                                                  18           



                                                  5:03:00           



                                                  CST, 2.4,           



                                                  m2             

 

     normal      2018      No                       1,000 mL,           [facility]

a



     saline 0.9%      1-08                               Rate: 100           l



     IV 1,000 mL      11:04:                               ml/hr,           [facility]

[facility]



               00                                 Infuse           



                                                  over: 10           



                                                  hr, Route:           



                                                  IV, Dosing           



                                                  Weight           



                                                  131.818           



                                                  kg, Total           



                                                  Volume:           



                                                  1,000,           



                                                  Start           



                                                  date:           



                                                  18           



                                                  5:04:00           



                                                  CST,           



                                                  Duration:           



                                                  30 day,           



                                                  Stop date:           



                                                  18           



                                                  5:03:00           



                                                  CST, 2.4,           



                                                  m2             

 

     normal      2018-      No                       1,000 mL,           [facility]

[facility]



     saline 0.9%      1-08                               Rate: 100           l



     IV 1,000 mL      11:04:                               ml/hr,           [facility]

[facility]



               00                                 Infuse           



                                                  over: 10           



                                                  hr, Route:           



                                                  IV, Dosing           



                                                  Weight           



                                                  131.818           



                                                  kg, Total           



                                                  Volume:           



                                                  1,000,           



                                                  Start           



                                                  date:           



                                                  18           



                                                  5:04:00           



                                                  CST,           



                                                  Duration:           



                                                  30 day,           



                                                  Stop date:           



                                                  18           



                                                  5:03:00           



                                                  CST, 2.4,           



                                                  m2             

 

     normal      2018-      No                       1,000 mL,           [facility]

[facility]



     saline 0.9%      1-08                               Rate: 100           l



     IV 1,000 mL      11:04:                               ml/hr,           [facility]

[facility]



               00                                 Infuse           



                                                  over: 10           



                                                  hr, Route:           



                                                  IV, Dosing           



                                                  Weight           



                                                  131.818           



                                                  kg, Total           



                                                  Volume:           



                                                  1,000,           



                                                  Start           



                                                  date:           



                                                  18           



                                                  5:04:00           



                                                  CST,           



                                                  Duration:           



                                                  30 day,           



                                                  Stop date:           



                                                  18           



                                                  5:03:00           



                                                  CST, 2.4,           



                                                  m2             

 

     normal      2018-      No                       1,000 mL,           [facility]

[facility]



     saline 0.9%      1-08                               Rate: 100           l



     IV 1,000 mL      11:04:                               ml/hr,           [facility]

[facility]



               00                                 Infuse           



                                                  over: 10           



                                                  hr, Route:           



                                                  IV, Dosing           



                                                  Weight           



                                                  131.818           



                                                  kg, Total           



                                                  Volume:           



                                                  1,000,           



                                                  Start           



                                                  date:           



                                                  18           



                                                  5:04:00           



                                                  CST,           



                                                  Duration:           



                                                  30 day,           



                                                  Stop date:           



                                                  18           



                                                  5:03:00           



                                                  CST, 2.4,           



                                                  m2             

 

     normal      -      No                       1,000 mL,           [facility]

[facility]



     saline 0.9%      1-08                               Rate: 100           l



     IV 1,000 mL      11:04:                               ml/hr,           [facility]

[facility]



               00                                 Infuse           



                                                  over: 10           



                                                  hr, Route:           



                                                  IV, Dosing           



                                                  Weight           



                                                  131.818           



                                                  kg, Total           



                                                  Volume:           



                                                  1,000,           



                                                  Start           



                                                  date:           



                                                  18           



                                                  5:04:00           



                                                  CST,           



                                                  Duration:           



                                                  30 day,           



                                                  Stop date:           



                                                  18           



                                                  5:03:00           



                                                  CST, 2.4,           



                                                  m2             

 

     normal      2018-1      No                       1,000 mL,           [facility]

[facility]



     saline 0.9%      1-08                               Rate: 100           l



     IV 1,000 mL      11:04:                               ml/hr,           [facility]

[facility]



               00                                 Infuse           



                                                  over: 10           



                                                  hr, Route:           



                                                  IV, Dosing           



                                                  Weight           



                                                  131.818           



                                                  kg, Total           



                                                  Volume:           



                                                  1,000,           



                                                  Start           



                                                  date:           



                                                  18           



                                                  5:04:00           



                                                  CST,           



                                                  Duration:           



                                                  30 day,           



                                                  Stop date:           



                                                  18           



                                                  5:03:00           



                                                  CST, 2.4,           



                                                  m2             

 

     Magnesium      2018      No                       Notes:           [facility]

a



     Sulfate      1-08                               WASTE: F/P           l



               10:36:                               - Sink; E           [place]



                                                -              



                                                  [employer]           



                                                  Trash [employer]           

 

     Isolyte S      2018      No                       Notes:           [facility]

a



     PH-7.4      1-08                               (Same as:           l



     (Bolus) IV      10:36:                               Isolyte S           [facility]

[facility]



               00                                 PH 7.4)           

 

     Magnesium      2018      No                       Notes:           [facility]

a



     Sulfate      1-08                               WASTE: F/P           l



               10:36:                               - Sink; E           [facility]



                                                -              



                                                  Municipal           



                                                  Trash Bin           

 

     Isolyte S      2018      No                       Notes:           [facility]

a



     PH-7.4      1-08                               (Same as:           l



     (Bolus) IV      10:36:                               [facility]te S           [facility]

[facility]



               00                                 PH 7.4)           

 

     Magnesium      2018      No                       Notes:           [facility]

a



     Sulfate      1-08                               WASTE: F/P           l



               10:36:                               - Sink; E           [facility]



                                                -              



                                                  Municipal           



                                                  Trash Bin           

 

     Isolyte S      2018      No                       Notes:           [facility]

a



     PH-7.4      1-08                               (Same as:           l



     (Bolus) IV      10:36:                               [facility] S           [facility]

[facility]



               00                                 PH 7.4)           

 

     Magnesium      2018      No                       Notes:           [facility]

a



     Sulfate      1-08                               WASTE: F/P           l



               10:36:                               - Sink; E           [facility]



                                                -              



                                                  [facility]           



                                                  [facility]           

 

     Isolyte S      2018      No                       Notes:           [facility]

a



     PH-7.4      1-08                               (Same as:           l



     (Bolus) IV      10:36:                               Isolyte S           He

rmann



               00                                 PH 7.4)           

 

     Magnesium      2018      No                       Notes:           [facility]

a



     Sulfate      1-08                               WASTE: F/P           l



               10:36:                               - Sink; E           [facility]



                                                -              



                                                  [facility]unicip[facility]           



                                                  Trash [facility]           

 

     Isolyte S      2018      No                       Notes:           [facility]

a



     PH-7.4      1-08                               (Same as:           l



     (Bolus) IV      10:36:                               Isolyte S           He

rmann



               00                                 PH 7.4)           

 

     Magnesium      2018      No                       Notes:           [facility]

a



     Sulfate      1-08                               WASTE: F/P           l



               10:36:                               - Sink; E           [facility]



                                                -              



                                                  [facility]unicip[facility]           



                                                  Trash [facility]           

 

     Isolyte S      2018      No                       Notes:           [facility]

a



     PH-7.4      1-08                               (Same as:           l



     (Bolus) IV      10:36:                               Isolyte S           He

rmann



               00                                 PH 7.4)           

 

     Magnesium      2018      No                       Notes:           [facility]

a



     Sulfate      1-08                               WASTE: F/P           l



               10:36:                               - Sink; E           [facility]



                                                -              



                                                  [facility]unicipal           



                                                  Trash Bin           

 

     Isolyte S      2018      No                       Notes:           [facility]

a



     PH-7.4      1-08                               (Same as:           l



     (Bolus) IV      10:36:                               Isolyte S           He

rmann



               00                                 PH 7.4)           

 

     Magnesium      2018      No                       Notes:           [facility]

a



     Sulfate      1-08                               WASTE: F/P           l



               10:36:                               - Sink; E           [facility]



                                                -              



                                                  Municipal           



                                                  Trash Bin           

 

     Isolyte S      2018      No                       Notes:           [facility]

a



     PH-7.4      1-08                               (Same as:           l



     (Bolus) IV      10:36:                               Isolyte S           He

rmann



               00                                 PH 7.4)           

 

     Phenergan      2018      No                       12.5 mg,           [facility]

rajeev



               1-08                               0.5 mL,           l



               10:35:                               Route:           [place]



               00                                 IVPB, Drug           



                                                  form: INJ,           



                                                  ONCE,           



                                                  Dosing           



                                                  Weight           



                                                  131.818,           



                                                  kg,            



                                                  Priority:           



                                                  STAT,           



                                                  Start           



                                                  date:           



                                                  18           



                                                  4:35:00           



                                                  CST, Stop           



                                                  date:           



                                                  18           



                                                  4:35:00           



                                                  CST            

 

     Phenergan      -      No                       12.5 mg,           [facility]

rajeev



               1-08                               0.5 mL,           l



               10:35:                               Route:           [facility]



               00                                 IVPB, Drug           



                                                  form: INJ,           



                                                  ONCE,           



                                                  Dosing           



                                                  Weight           



                                                  131.818,           



                                                  kg,            



                                                  Priority:           



                                                  STAT,           



                                                  Start           



                                                  date:           



                                                  18           



                                                  4:35:00           



                                                  CST, Stop           



                                                  date:           



                                                  18           



                                                  4:35:00           



                                                  CST            

 

     Phenergan      2018      No                       12.5 mg,           [facility]

rajeev



               1-08                               0.5 mL,           l



               10:35:                               Route:           [place]



               00                                 IVPB, Drug           



                                                  form: INJ,           



                                                  ONCE,           



                                                  Dosing           



                                                  Weight           



                                                  131.818,           



                                                  kg,            



                                                  Priority:           



                                                  STAT,           



                                                  Start           



                                                  date:           



                                                  18           



                                                  4:35:00           



                                                  CST, Stop           



                                                  date:           



                                                  18           



                                                  4:35:00           



                                                  CST            

 

     Phenergan      -      No                       12.5 mg,           [facility]

rajeev



               1-08                               0.5 mL,           l



               10:35:                               Route:           [facility]



               00                                 IVPB, Drug           



                                                  form: INJ,           



                                                  ONCE,           



                                                  Dosing           



                                                  Weight           



                                                  131.818,           



                                                  kg,            



                                                  Priority:           



                                                  STAT,           



                                                  Start           



                                                  date:           



                                                  18           



                                                  4:35:00           



                                                  CST, Stop           



                                                  date:           



                                                  18           



                                                  4:35:00           



                                                  CST            

 

     Phenergan      2018-1      No                       12.5 mg,           [facility]

rajeev



               1-08                               0.5 mL,           l



               10:35:                               Route:           [facility]



                                                IVPB, Drug           



                                                  form: INJ,           



                                                  ONCE,           



                                                  Dosing           



                                                  Weight           



                                                  131.818,           



                                                  kg,            



                                                  Priority:           



                                                  STAT,           



                                                  Start           



                                                  date:           



                                                  18           



                                                  4:35:00           



                                                  CST, Stop           



                                                  date:           



                                                  18           



                                                  4:35:00           



                                                  CST            

 

     Phenergan      2018-1      No                       12.5 mg,           [facility]

rajeev



               1-08                               0.5 mL,           l



               10:35:                               Route:           [place]



                                                IVPB, Drug           



                                                  form: INJ,           



                                                  ONCE,           



                                                  Dosing           



                                                  Weight           



                                                  131.818,           



                                                  kg,            



                                                  Priority:           



                                                  STAT,           



                                                  Start           



                                                  date:           



                                                  18           



                                                  4:35:00           



                                                  CST, Stop           



                                                  date:           



                                                  18           



                                                  4:35:00           



                                                  CST            

 

     Phenergan      2018-1      No                       12.5 mg,           [facility]

rajeev



               1-08                               0.5 mL,           l



               10:35:                               Route:           [place]                                 IVPB, Drug           



                                                  form: INJ,           



                                                  ONCE,           



                                                  Dosing           



                                                  Weight           



                                                  131.818,           



                                                  kg,            



                                                  Priority:           



                                                  STAT,           



                                                  Start           



                                                  date:           



                                                  18           



                                                  4:35:00           



                                                  CST, Stop           



                                                  date:           



                                                  18           



                                                  4:35:00           



                                                  CST            

 

     Phenergan      2018-1      No                       12.5 mg,           [facility]

rajeev



               1-08                               0.5 mL,           l



               10:35:                               Route:           [place]                                 IVPB, Drug           



                                                  form: INJ,           



                                                  ONCE,           



                                                  Dosing           



                                                  Weight           



                                                  131.818,           



                                                  kg,            



                                                  Priority:           



                                                  STAT,           



                                                  Start           



                                                  date:           



                                                  18           



                                                  4:35:00           



                                                  CST, Stop           



                                                  date:           



                                                  18           



                                                  4:35:00           



                                                  CST            

 

     Wellbutrin Wellbutrin 2018-1      No             1{table BID  Wellbutrin   

        



      MG  MG 0-04                     t_in_th       MG        

   



               00:00:                     e_morni                     



               00                       ng}                      

 

     Wellbutrin Wellbutrin 2018-1      No             1{table BID  Wellbutrin   

        



      MG  MG 0-04                     t_in_th       MG        

   



               00:00:                     e_morni                     



               00                       ng}                      

 

     Wellbutrin Wellbutrin 2018-1      No             1{table BID               

  



      MG  MG 0-04                     t_in_th                     



               00:00:                     e_morni                     



               00                       ng}                      

 

     Wellbutrin Wellbutrin 2018      No             1{table BID  Wellbutrin   

        



      MG  MG 0-04                     t_in_th       MG        

   



               00:00:                     e_morni                     



               00                       ng}                      

 

     Wellbutrin Wellbutrin 2018      No             1{table BID  Wellbutrin   

        



      MG  MG 0-04                     t_in_th       MG        

   



               00:00:                     e_morni                     



               00                       ng}                      

 

     Wellbutrin Wellbutrin 2018      No             1{table BID  Wellbutrin   

        



      MG  MG 0-04                     t_in_th       MG        

   



               00:00:                     e_morni                     



               00                       ng}                      

 

     Wellbutrin Wellbutrin 2018      No             1{table BID  Wellbutrin   

        



      MG  MG 0-04                     t_in_th       MG        

   



               00:00:                     e_morni                     



               00                       ng}                      

 

     Citalopram Citalopram       Yes  [facility] [place]                1 tablet     

      Common



     Hydrobromid Hydrobromid 7-16                                              S

pirit



     e    e    00:00:                                              - [facility]



                                                               [facility]

 

     Seroquel Seroquel 0      Yes  [facility] [place]                1 tablet         

  Common



               7-16                                              [facility]



               00:00:                                               [facility]



                                                               [facility]

 

     Docusate            Yes                      100 mg = 1           [facility]

[facility]



     Sodium 100      5-08                               cap, PO,           l



     MG Oral      14:56:                               BID, 0           [facility]



     Capsule      00                                 Refill(s)           

 

     Zosyn      0      Yes                      0              [facility]



               5-08                               Refill(s)           l



               14:56:                                              [place]



               00                                                

 

     celecoxib      0      Yes                      200 mg = 1           Me

moria



     200 mg oral      5-08                               cap, PO,           l



     capsule      14:56:                               BID, 0           [facility]



               00                                 Refill(s)           

 

     ascorbic            Yes                      500 mg = 1           [facility]

[facility]



     acid      5-08                               tab, PO,           l



               14:56:                               BID, 0           [place]



               00                                 Refill(s)           

 

     acetaminoph      0      Yes                      1,000 mg =           

[facility]



     en 500 mg      5-08                               2 tab, PO,           l



     oral tablet      14:56:                               Q6Hnow, 0           H

ermann



               00                                 Refill(s)           

 

     Oxycodone      0      Yes                      5 mg = 1           [facility]

rajeev



     Hydrochlori      5-08                               tab, PO,           l



     de 5 MG      14:56:                               Q4H, PRN           [facility]

n



     Oral Tablet      00                                 Pain Score           



                                                  4-6, 0           



                                                  Refill(s)           

 

     zinc            Yes                      220 mg = 1           [facility]



     sulfate 220      5-08                               cap, PO,           l



     mg oral      14:56:                               Daily, 0           [facility]

n



     capsule      00                                 Refill(s)           

 

     multivitami            Yes                      1 tab, PO,           

[facility]



     n         5-08                               Daily, 0           l



               14:56:                               Refill(s)           [facility]



               00                                                

 

     methocarbam            Yes                      1,000 mg =           

[facility]



     ol 500 mg      5-08                               2 tab, PO,           l



     oral tablet      14:56:                               Q8H, 0           [facility]

nguyen



               00                                 Refill(s)           

 

     LORazepam            Yes                      0.5 mg = 1           Me

moria



     0.5 mg oral      5-08                               tab, PO,           l



     tablet      14:56:                               Q8H, PRN           [facility]



               00                                 Anxiety, 0           



                                                  Refill(s)           

 

     Lidocaine            Yes                      3 patch,           [facility]

rajeev



     Hydrochlori      5-08                               TOP,           l



     de 0.05      14:56:                               Daily,           [facility]



     MG/MG      00                                 Remove           



     Transdermal                                         after 12           



     Patch                                         hours, 0           



     [Lidoderm]                                         Refill(s)           

 

     Docusate            Yes                      100 mg = 1           [facility]

[facility]



     Sodium 100      5-08                               cap, PO,           l



     MG Oral      14:56:                               BID, 0           [place]



     Capsule      00                                 Refill(s)           

 

     Zosyn            Yes                      0              [facility]



               5-08                               Refill(s)           l



               14:56:                                              [place]



               00                                                

 

     celecoxib            Yes                      200 mg = 1           Me

moria



     200 mg oral      5-08                               cap, PO,           l



     capsule      14:56:                               BID, 0           [place]



               00                                 Refill(s)           

 

     ascorbic            Yes                      500 mg = 1           [facility]

[facility]



     acid      5-08                               tab, PO,           l



               14:56:                               BID, 0           [facility]



               00                                 Refill(s)           

 

     acetaminoph            Yes                      1,000 mg =           

[facility]



     en 500 mg      5-08                               2 tab, PO,           l



     oral tablet      14:56:                               Q6Hnow, 0           H

ermann



               00                                 Refill(s)           

 

     Oxycodone            Yes                      5 mg = 1           [facility]

rajeev



     Hydrochlori      5-08                               tab, PO,           l



     de 5 MG      14:56:                               Q4H, PRN           [facility]

n



     Oral Tablet      00                                 Pain Score           



                                                  4-6, 0           



                                                  Refill(s)           

 

     zinc            Yes                      220 mg = 1           [facility]



     sulfate 220      5-08                               cap, PO,           l



     mg oral      14:56:                               Daily, 0           [facility]

n



     capsule      00                                 Refill(s)           

 

     multivitami            Yes                      1 tab, PO,           

[facility]



     n         5-08                               Daily, 0           l



               14:56:                               Refill(s)           [facility]



               00                                                

 

     methocarbam            Yes                      1,000 mg =           

[facility]



     ol 500 mg      5-08                               2 tab, PO,           l



     oral tablet      14:56:                               Q8H, 0           [facility]

[place]



               00                                 Refill(s)           

 

     LORazepam            Yes                      0.5 mg = 1           [facility]

[facility]



     0.5 mg oral      5-08                               tab, PO,           l



     tablet      14:56:                               Q8H, PRN           [facility]



               00                                 Anxiety, 0           



                                                  Refill(s)           

 

     Lidocaine            Yes                      3 patch,           [facility]

[facility]



     Hydrochlori      5-08                               TOP,           l



     de 0.05      14:56:                               Daily,           [facility]



     MG/MG      00                                 Remove           



     Transdermal                                         after 12           



     Patch                                         hours, 0           



     [Lidoderm]                                         Refill(s)           

 

     Docusate            Yes                      100 mg = 1           [facility]

[facility]



     Sodium 100      5-08                               cap, PO,           l



     MG Oral      14:56:                               BID, 0           [facility]



     Capsule      00                                 Refill(s)           

 

     Zosyn            Yes                      0              [facility]



               5-08                               Refill(s)           l



               14:56:                                              [facility]



               00                                                

 

     celecoxib            Yes                      200 mg = 1           [facility]

[facility]



     200 mg oral      5-08                               cap, PO,           l



     capsule      14:56:                               BID, 0           [facility]



               00                                 Refill(s)           

 

     ascorbic            Yes                      500 mg = 1           [facility]

[facility]



     acid      5-08                               tab, PO,           l



               14:56:                               BID, 0           [facility]



               00                                 Refill(s)           

 

     acetaminoph            Yes                      1,000 mg =           

[facility]



     en 500 mg      5-08                               2 tab, PO,           l



     oral tablet      14:56:                               Q6Hnow, 0           [facility]

[facility]



               00                                 Refill(s)           

 

     Oxycodone            Yes                      5 mg = 1           [facility]

[facility]



     Hydrochlori      5-08                               tab, PO,           l



     de 5 MG      14:56:                               Q4H, PRN           [facility]

n



     Oral Tablet      00                                 Pain Score           



                                                  4-6, 0           



                                                  Refill(s)           

 

     zinc            Yes                      220 mg = 1           [facility]



     sulfate 220      5-08                               cap, PO,           l



     mg oral      14:56:                               Daily, 0           [facility]

n



     capsule      00                                 Refill(s)           

 

     multivitami            Yes                      1 tab, PO,           

[facility]



     n         5-08                               Daily, 0           l



               14:56:                               Refill(s)           [facility]



                                                               

 

     methocarbam            Yes                      1,000 mg =           

[facility]



     ol 500 mg      5-08                               2 tab, PO,           l



     oral tablet      14:56:                               Q8H, 0           [facility]

[place]



               00                                 Refill(s)           

 

     LORazepam            Yes                      0.5 mg = 1           [facility]

[facility]



     0.5 mg oral      5-08                               tab, PO,           l



     tablet      14:56:                               Q8H, PRN           [facility]



               00                                 Anxiety, 0           



                                                  Refill(s)           

 

     Lidocaine            Yes                      3 patch,           [facility]

[facility]



     Hydrochlori      5-08                               TOP,           l



     de 0.05      14:56:                               Daily,           [facility]



     MG/MG      00                                 Remove           



     Transdermal                                         after 12           



     Patch                                         hours, 0           



     [Lidoderm]                                         Refill(s)           

 

     Docusate            Yes                      100 mg = 1           [facility]

[facility]



     Sodium 100      5-08                               cap, PO,           l



     MG Oral      14:56:                               BID, 0           [facility]



     Capsule      00                                 Refill(s)           

 

     Zosyn            Yes                      0              [facility]



               5-08                               Refill(s)           l



               14:56:                                              [facility]



               00                                                

 

     celecoxib            Yes                      200 mg = 1           [facility]

[facility]



     200 mg oral      5-08                               cap, PO,           l



     capsule      14:56:                               BID, 0           [facility]



               00                                 Refill(s)           

 

     ascorbic            Yes                      500 mg = 1           [facility]

[facility]



     acid      5-08                               tab, PO,           l



               14:56:                               BID, 0           [facility]



               00                                 Refill(s)           

 

     acetaminoph            Yes                      1,000 mg =           

[facility]



     en 500 mg      5-08                               2 tab, PO,           l



     oral tablet      14:56:                               Q6Hnow, 0           [facility]

[facility]



               00                                 Refill(s)           

 

     Oxycodone            Yes                      5 mg = 1           [facility]

[facility]



     Hydrochlori      5-08                               tab, PO,           l



     de 5 MG      14:56:                               Q4H, PRN           [facility]

n



     Oral Tablet      00                                 Pain Score           



                                                  4-6, 0           



                                                  Refill(s)           

 

     zinc            Yes                      220 mg = 1           [facility]



     sulfate 220      5-08                               cap, PO,           l



     mg oral      14:56:                               Daily, 0           [facility]

n



     capsule      00                                 Refill(s)           

 

     multivitami            Yes                      1 tab, PO,           

[facility]



     n         5-08                               Daily, 0           l



               14:56:                               Refill(s)           [facility]



                                                               

 

     methocarbam            Yes                      1,000 mg =           

[facility]



     ol 500 mg      5-08                               2 tab, PO,           l



     oral tablet      14:56:                               Q8H, 0           [facility]

[facility]



                                                Refill(s)           

 

     LORazepam            Yes                      0.5 mg = 1           [facility]

[facility]



     0.5 mg oral      5-08                               tab, PO,           l



     tablet      14:56:                               Q8H, PRN           [facility]



               00                                 Anxiety, 0           



                                                  Refill(s)           

 

     Lidocaine            Yes                      3 patch,           [facility]

[facility]



     Hydrochlori      5-08                               TOP,           l



     de 0.05      14:56:                               Daily,           [facility]



     MG/MG      00                                 Remove           



     Transdermal                                         after 12           



     Patch                                         hours, 0           



     [Lidoderm]                                         Refill(s)           

 

     Docusate            Yes                      100 mg = 1           [facility]

[facility]



     Sodium 100      5-08                               cap, PO,           l



     MG Oral      14:56:                               BID, 0           [facility]



     Capsule      00                                 Refill(s)           

 

     Zosyn            Yes                      0              [facility]



               5-08                               Refill(s)           l



               14:56:                                              [facility]



               00                                                

 

     celecoxib            Yes                      200 mg = 1           [facility]

[facility]



     200 mg oral      5-08                               cap, PO,           l



     capsule      14:56:                               BID, 0           [facility]



                                                Refill(s)           

 

     ascorbic            Yes                      500 mg = 1           [facility]

[facility]



     acid      5-08                               tab, PO,           l



               14:56:                               BID, 0           [facility]



               00                                 Refill(s)           

 

     acetaminoph            Yes                      1,000 mg =           

[facility]



     en 500 mg      5-08                               2 tab, PO,           l



     oral tablet      14:56:                               Q6Hnow, 0           [facility]

[facility]



                                                Refill(s)           

 

     Oxycodone            Yes                      5 mg = 1           [facility]

[facility]



     Hydrochlori      5-08                               tab, PO,           l



     de 5 MG      14:56:                               Q4H, PRN           [facility]

n



     Oral Tablet      00                                 Pain Score           



                                                  4-6, 0           



                                                  Refill(s)           

 

     zinc            Yes                      220 mg = 1           [facility]



     sulfate 220      5-08                               cap, PO,           l



     mg oral      14:56:                               Daily, 0           [facility]

n



     capsule      00                                 Refill(s)           

 

     multivitami            Yes                      1 tab, PO,           

[facility]



     n         5-08                               Daily, 0           l



               14:56:                               Refill(s)           [facility]



                                                               

 

     methocarbam            Yes                      1,000 mg =           

[facility]



     ol 500 mg      5-08                               2 tab, PO,           l



     oral tablet      14:56:                               Q8H, 0           [facility]

[facility]



               00                                 Refill(s)           

 

     LORazepam            Yes                      0.5 mg = 1           [facility]

[facility]



     0.5 mg oral      5-08                               tab, PO,           l



     tablet      14:56:                               Q8H, PRN           [facility]



               00                                 Anxiety, 0           



                                                  Refill(s)           

 

     Lidocaine            Yes                      3 patch,           [facility]

[facility]



     Hydrochlori      5-08                               TOP,           l



     de 0.05      14:56:                               Daily,           [facility]



     MG/MG      00                                 Remove           



     Transdermal                                         after 12           



     Patch                                         hours, 0           



     [Lidoderm]                                         Refill(s)           

 

     Docusate            Yes                      100 mg = 1           [facility]

[facility]



     Sodium 100      5-08                               cap, PO,           l



     MG Oral      14:56:                               BID, 0           [facility]



     Capsule      00                                 Refill(s)           

 

     Zosyn            Yes                      0              [facility]



               5-08                               Refill(s)           l



               14:56:                                              [facility]



               00                                                

 

     celecoxib            Yes                      200 mg = 1           [facility]

[facility]



     200 mg oral      5-08                               cap, PO,           l



     capsule      14:56:                               BID, 0           [facility]



               00                                 Refill(s)           

 

     ascorbic            Yes                      500 mg = 1           [facility]

[facility]



     acid      5-08                               tab, PO,           l



               14:56:                               BID, 0           [facility]



               00                                 Refill(s)           

 

     acetaminoph            Yes                      1,000 mg =           

[facility]



     en 500 mg      5-08                               2 tab, PO,           l



     oral tablet      14:56:                               Q6Hnow, 0           [facility]

[facility]



               00                                 Refill(s)           

 

     Oxycodone            Yes                      5 mg = 1           [facility]

[facility]



     Hydrochlori      5-08                               tab, PO,           l



     de 5 MG      14:56:                               Q4H, PRN           [facility]

n



     Oral Tablet      00                                 Pain Score           



                                                  4-6, 0           



                                                  Refill(s)           

 

     zinc            Yes                      220 mg = 1           [facility]



     sulfate 220      5-08                               cap, PO,           l



     mg oral      14:56:                               Daily, 0           [facility]

n



     capsule      00                                 Refill(s)           

 

     multivitami      0      Yes                      1 tab, PO,           

[facility]



     n         5-08                               Daily, 0           l



               14:56:                               Refill(s)           [facility]



               00                                                

 

     methocarbam            Yes                      1,000 mg =           

[facility]



     ol 500 mg      5-08                               2 tab, PO,           l



     oral tablet      14:56:                               Q8H, 0           [facility]

[facility]



               00                                 Refill(s)           

 

     LORazepam            Yes                      0.5 mg = 1           [facility]

[facility]



     0.5 mg oral      5-08                               tab, PO,           l



     tablet      14:56:                               Q8H, PRN           [facility]



               00                                 Anxiety, 0           



                                                  Refill(s)           

 

     Lidocaine            Yes                      3 patch,           [facility]

[facility]



     Hydrochlori      5-08                               TOP,           l



     de 0.05      14:56:                               Daily,           [facility]



     MG/MG      00                                 Remove           



     Transdermal                                         after 12           



     Patch                                         hours, 0           



     [Lidoderm]                                         Refill(s)           

 

     Docusate            Yes                      100 mg = 1           [facility]

[facility]



     Sodium 100      5-08                               cap, PO,           l



     MG Oral      14:56:                               BID, 0           [facility]



     Capsule      00                                 Refill(s)           

 

     Zosyn            Yes                      0              [facility]



               5-08                               Refill(s)           l



               14:56:                                              [facility]



               00                                                

 

     celecoxib            Yes                      200 mg = 1           [facility]

[facility]



     200 mg oral      5-08                               cap, PO,           l



     capsule      14:56:                               BID, 0           [facility]



               00                                 Refill(s)           

 

     ascorbic            Yes                      500 mg = 1           [facility]

[facility]



     acid      5-08                               tab, PO,           l



               14:56:                               BID, 0           [facility]



               00                                 Refill(s)           

 

     acetaminoph            Yes                      1,000 mg =           

[facility]



     en 500 mg      5-08                               2 tab, PO,           l



     oral tablet      14:56:                               Q6Hnow, 0           [facility]

[facility]



               00                                 Refill(s)           

 

     Oxycodone            Yes                      5 mg = 1           [facility]

[facility]



     Hydrochlori      5-08                               tab, PO,           l



     de 5 MG      14:56:                               Q4H, PRN           [facility]

n



     Oral Tablet      00                                 Pain Score           



                                                  4-6, 0           



                                                  Refill(s)           

 

     zinc            Yes                      220 mg = 1           [facility]



     sulfate 220      5-08                               cap, PO,           l



     mg oral      14:56:                               Daily, 0           [facility]

n



     capsule      00                                 Refill(s)           

 

     multivitami            Yes                      1 tab, PO,           

[facility]



     n         5-08                               Daily, 0           l



               14:56:                               Refill(s)           [facility]



               00                                                

 

     methocarbam            Yes                      1,000 mg =           

[facility]



     ol 500 mg      5-08                               2 tab, PO,           l



     oral tablet      14:56:                               Q8H, 0           [facility]

[facility]



               00                                 Refill(s)           

 

     LORazepam            Yes                      0.5 mg = 1           [facility]

[facility]



     0.5 mg oral      5-08                               tab, PO,           l



     tablet      14:56:                               Q8H, PRN           [facility]



               00                                 Anxiety, 0           



                                                  Refill(s)           

 

     Lidocaine            Yes                      3 patch,           [facility]

[facility]



     Hydrochlori      5-08                               TOP,           l



     de 0.05      14:56:                               Daily,           [facility]



     MG/MG      00                                 Remove           



     Transdermal                                         after 12           



     Patch                                         hours, 0           



     [Lidoderm]                                         Refill(s)           

 

     Docusate            Yes                      100 mg = 1           [facility]

[facility]



     Sodium 100      5-08                               cap, PO,           l



     MG Oral      14:56:                               BID, 0           [facility]



     Capsule      00                                 Refill(s)           

 

     Zosyn            Yes                      0              [facility]



               5-08                               Refill(s)           l



               14:56:                                              [facility]



               00                                                

 

     celecoxib            Yes                      200 mg = 1           [facility]

[facility]



     200 mg oral      5-08                               cap, PO,           l



     capsule      14:56:                               BID, 0           [facility]



               00                                 Refill(s)           

 

     ascorbic            Yes                      500 mg = 1           [facility]

[facility]



     acid      5-08                               tab, PO,           l



               14:56:                               BID, 0           [facility]



               00                                 Refill(s)           

 

     acetaminoph            Yes                      1,000 mg =           

[facility]



     en 500 mg      5-08                               2 tab, PO,           l



     oral tablet      14:56:                               Q6Hnow, 0           [facility]

[facility]



               00                                 Refill(s)           

 

     Oxycodone            Yes                      5 mg = 1           [facility]

[facility]



     Hydrochlori      5-08                               tab, PO,           l



     de 5 MG      14:56:                               Q4H, PRN           [facility]

n



     Oral Tablet      00                                 Pain Score           



                                                  4-6, 0           



                                                  Refill(s)           

 

     zinc            Yes                      220 mg = 1           [facility]



     sulfate 220      5-08                               cap, PO,           l



     mg oral      14:56:                               Daily, 0           [facility]

n



     capsule      00                                 Refill(s)           

 

     multivitami            Yes                      1 tab, PO,           

[facility]



     n         5-08                               Daily, 0           l



               14:56:                               Refill(s)           [facility]



               00                                                

 

     methocarbam            Yes                      1,000 mg =           

[facility]



     ol 500 mg      5-08                               2 tab, PO,           l



     oral tablet      14:56:                               Q8H, 0           [facility]

[place]



               00                                 Refill(s)           

 

     LORazepam            Yes                      0.5 mg = 1           [facility]

[facility]



     0.5 mg oral      5-08                               tab, PO,           l



     tablet      14:56:                               Q8H, PRN           [facility]



               00                                 Anxiety, 0           



                                                  Refill(s)           

 

     Lidocaine            Yes                      3 patch,           [facility]

ra[facility]



     Hydrochlori      5-08                               TOP,           l



     de 0.05      14:56:                               Daily,           [facility]



     MG/MG      00                                 Remove           



     Transdermal                                         after 12           



     Patch                                         hours, 0           



     [Lidoderm]                                         Refill(s)           

 

     Robaxin            No                       Notes:           [facility]



               5-06                               (Same           l



               21:00:                               as:Robaxin           [facility]



               00                                 )              

 

     Robaxin            No                       Notes:           [facility]



               5-06                               (Same           l



               21:00:                               as:Robaxin           [facility]



               00                                 )              

 

     Robaxin            No                       Notes:           [facility]



               5-06                               (Same           l



               21:00:                               as:Robaxin           [place]



                                                )              

 

     Robaxin            No                       Notes:           [facility]



               5-06                               (Same           l



               21:00:                               as:Robaxin           [place]



               00                                 )              

 

     Robaxin            No                       Notes:           [facility]



               5-06                               (Same           l



               21:00:                               as:Robaxin           [facility]



                                                )              

 

     Robaxin            No                       Notes:           [facility]



               5-06                               (Same           l



               21:00:                               as:Robaxin           [place]



                                                )              

 

     Robaxin            No                       Notes:           [facility]



               5-06                               (Same           l



               21:00:                               as:Robaxin           [place]



                                                )              

 

     Robaxin            No                       Notes:           [facility]



               5-06                               (Same           l



               21:00:                               as:Robaxin           [facility]



                                                )              

 

     Oxycodone            No                       Notes:           [facility]

a



     Hydrochlori      5-06                               (Same as:           l



     de 5 MG      18:06:                               Roxicodone           [facility]

[facility]



     Oral Tablet      00                                 )              

 

     Oxycodone            No                       Notes:           [facility]

a



     Hydrochlori      5-06                               (Same as:           l



     de 5 MG      18:06:                               Roxicodone           [facility]

[facility]



     Oral Tablet      00                                 )              

 

     Oxycodone            No                       Notes:           [facility]

a



     Hydrochlori      5-06                               (Same as:           l



     de 5 MG      18:06:                               Roxicodone           [facility]

[facility]



     Oral Tablet      00                                 )              

 

     Oxycodone            No                       Notes:           [facility]

a



     Hydrochlori      5-06                               (Same as:           l



     de 5 MG      18:06:                               Roxicodone           [facility]

[facility]



     Oral Tablet      00                                 )              

 

     Oxycodone            No                       Notes:           [facility]

a



     Hydrochlori      5-06                               (Same as:           l



     de 5 MG      18:06:                               Roxicodone           [facility]

[facility]



     Oral Tablet      00                                 )              

 

     Oxycodone            No                       Notes:           [facility]

a



     Hydrochlori      5-06                               (Same as:           l



     de 5 MG      18:06:                               Roxicodone           [facility]

[facility]



     Oral Tablet      00                                 )              

 

     Oxycodone            No                       Notes:           [facility]

a



     Hydrochlori      5-06                               (Same as:           l



     de 5 MG      18:06:                               Roxicodone           [facility]

[facility]



     Oral Tablet      00                                 )              

 

     Oxycodone            No                       Notes:           [facility]

a



     Hydrochlori      5-06                               (Same as:           l



     de 5 MG      18:06:                               Roxicodone           [facility]

[facility]



     Oral Tablet      00                                 )              

 

     Ativan            No                       Notes:           [facility]



               5-06                               (Same as:           l



               15:18:                               Ativan)           [facility]



                                                               

 

     Ativan            No                       Notes:           [facility]



               5-06                               (Same as:           l



               15:18:                               Ativan)           [facility]



                                                               

 

     Ativan            No                       Notes:           [facility]



               5-06                               (Same as:           l



               15:18:                               Ativan)           [place]



                                                               

 

     Ativan            No                       Notes:           [facility]



               5-06                               (Same as:           l



               15:18:                               Ativan)           [facility]



                                                               

 

     Ativan            No                       Notes:           [facility]



               5-06                               (Same as:           l



               15:18:                               Ativan)           [place]



                                                               

 

     Ativan            No                       Notes:           [facility]



               5-06                               (Same as:           l



               15:18:                               Ativan)           [facility]



                                                               

 

     Ativan            No                       Notes:           [facility]



               5-06                               (Same as:           l



               15:18:                               Ativan)           [facility]



                                                               

 

     Ativan            No                       Notes:           [facility]



               5-06                               (Same as:           l



               15:18:                               Ativan)           [facility]



               00                                                

 

     Trazodone            No                       Notes:           [facility]

a



     Hydrochlori      5-06                               (Same As:           l



     de 50 MG      02:00:                               Desyrel)           [facility]

nn



     Oral Tablet      00                                                

 

     remove            No                       Notes:           [facility]



     patch      5-06                               Remove           l



               02:00:                               patch 12           [facility]



               00                                 hours           



                                                  after           



                                                  applicatio           



                                                  n each           



                                                  day.           

 

     Trazodone            No                       Notes:           [facility]

a



     Hydrochlori      5-06                               (Same As:           l



     de 50 MG      02:00:                               Desyrel)           [facility]

nn



     Oral Tablet      00                                                

 

     remove            No                       Notes:           [facility]



     patch      5-06                               Remove           l



               02:00:                               patch 12           [place]



               00                                 hours           



                                                  after           



                                                  applicatio           



                                                  n each           



                                                  day.           

 

     Trazodone            No                       Notes:           [facility]

a



     Hydrochlori      5-06                               (Same As:           l



     de 50 MG      02:00:                               Desyrel)           [facility]

nn



     Oral Tablet      00                                                

 

     remove            No                       Notes:           [facility]



     patch      5-06                               Remove           l



               02:00:                               patch 12           [place]



               00                                 hours           



                                                  after           



                                                  applicatio           



                                                  n each           



                                                  day.           

 

     Trazodone            No                       Notes:           [facility]

a



     Hydrochlori      5-06                               (Same As:           l



     de 50 MG      02:00:                               Desyrel)           [facility]

nn



     Oral Tablet      00                                                

 

     remove            No                       Notes:           [facility]



     patch      5-06                               Remove           l



               02:00:                               patch 12           [place]



               00                                 hours           



                                                  after           



                                                  applicatio           



                                                  n each           



                                                  day.           

 

     Trazodone            No                       Notes:           [facility]

a



     Hydrochlori      5-06                               (Same As:           l



     de 50 MG      02:00:                               Desyrel)           [facility]

nn



     Oral Tablet      00                                                

 

     remove            No                       Notes:           [facility]



     patch      5-06                               Remove           l



               02:00:                               patch 12           [place]



               00                                 hours           



                                                  after           



                                                  applicatio           



                                                  n each           



                                                  day.           

 

     Trazodone            No                       Notes:           [facility]

a



     Hydrochlori      5-06                               (Same As:           l



     de 50 MG      02:00:                               Desyrel)           [facility]

nn



     Oral Tablet      00                                                

 

     remove            No                       Notes:           [facility]



     patch      5-06                               Remove           l



               02:00:                               patch 12           [place]



               00                                 hours           



                                                  after           



                                                  applicatio           



                                                  n each           



                                                  day.           

 

     Trazodone            No                       Notes:           [facility]

a



     Hydrochlori      5-06                               (Same As:           l



     de 50 MG      02:00:                               Desyrel)           [facility]

nn



     Oral Tablet      00                                                

 

     remove            No                       Notes:           [facility]



     patch      5-06                               Remove           l



               02:00:                               patch 12           [facility]



               00                                 hours           



                                                  after           



                                                  applicatio           



                                                  n each           



                                                  day.           

 

     Trazodone            No                       Notes:           [facility]

a



     Hydrochlori      5-06                               (Same As:           l



     de 50 MG      02:00:                               Desyrel)           [facility]

nn



     Oral Tablet      00                                                

 

     remove            No                       Notes:           [facility]



     patch      5-06                               Remove           l



               02:00:                               patch 12           [facility]



               00                                 hours           



                                                  after           



                                                  applicatio           



                                                  n each           



                                                  day.           

 

     Oxycodone            No                       Notes:           [facility]

a



     Hydrochlori      5-05                               (Same as:           l



     de 5 MG      22:01:                               Roxicodone           [facility]

[facility]



     Oral Tablet      00                                 )              

 

     Oxycodone            No                       Notes:           [facility]

a



     Hydrochlori      5-05                               (Same as:           l



     de 5 MG      22:01:                               Roxicodone           [facility]

[facility]



     Oral Tablet      00                                 )              

 

     Oxycodone            No                       Notes:           [facility]

a



     Hydrochlori      5-05                               (Same as:           l



     de 5 MG      22:01:                               Roxicodone           [facility]

[facility]



     Oral Tablet      00                                 )              

 

     Oxycodone            No                       Notes:           [facility]

a



     Hydrochlori      5-05                               (Same as:           l



     de 5 MG      22:01:                               Roxicodone           [facility]

[facility]



     Oral Tablet      00                                 )              

 

     Oxycodone            No                       Notes:           [facility]

a



     Hydrochlori      5-05                               (Same as:           l



     de 5 MG      22:01:                               Roxicodone           [facility]

[facility]



     Oral Tablet      00                                 )              

 

     Oxycodone            No                       Notes:           [facility]

a



     Hydrochlori      5-05                               (Same as:           l



     de 5 MG      22:01:                               Roxicodone           [facility]

[facility]



     Oral Tablet      00                                 )              

 

     Oxycodone            No                       Notes:           [facility]

a



     Hydrochlori      5-05                               (Same as:           l



     de 5 MG      22:01:                               Roxicodone           [facility]

[facility]



     Oral Tablet      00                                 )              

 

     Oxycodone            No                       Notes:           [facility]

a



     Hydrochlori      5-05                               (Same as:           l



     de 5 MG      22:01:                               Roxicodone           [facility]

[facility]



     Oral Tablet      00                                 )              

 

     Celebrex            No                       Notes:           [facility]



               5-05                               NSAID.           l



               22:00:                               Please           [facility]



               00                                 check           



                                                  indication           



                                                  . Not for           



                                                  seizure.           



                                                  (Same As:           



                                                  CeleBREX)           

 

     Celebrex            No                       Notes:           [facility]



               5-05                               NSAID.           l



               22:00:                               Please           [place]



               00                                 check           



                                                  indication           



                                                  . Not for           



                                                  seizure.           



                                                  (Same As:           



                                                  CeleBREX)           

 

     Celebrex      0      No                       Notes:           [facility]



               5-05                               NSAID.           l



               22:00:                               Please           [facility]



               00                                 check           



                                                  indication           



                                                  . Not for           



                                                  seizure.           



                                                  (Same As:           



                                                  CeleBREX)           

 

     Celebrex      0      No                       Notes:           [facility]



               5-05                               NSAID.           l



               22:00:                               Please           [place]



               00                                 check           



                                                  indication           



                                                  . Not for           



                                                  seizure.           



                                                  (Same As:           



                                                  CeleBREX)           

 

     Celebrex      0      No                       Notes:           [facility]



               5-05                               NSAID.           l



               22:00:                               Please           [place]



               00                                 check           



                                                  indication           



                                                  . Not for           



                                                  seizure.           



                                                  (Same As:           



                                                  CeleBREX)           

 

     Celebrex      0      No                       Notes:           [facility]



               5-05                               NSAID.           l



               22:00:                               Please           [place]



               00                                 check           



                                                  indication           



                                                  . Not for           



                                                  seizure.           



                                                  (Same As:           



                                                  CeleBREX)           

 

     Celebrex      0      No                       Notes:           [facility]



               5-05                               NSAID.           l



               22:00:                               Please           [place]



               00                                 check           



                                                  indication           



                                                  . Not for           



                                                  seizure.           



                                                  (Same As:           



                                                  CeleBREX)           

 

     Celebrex      0      No                       Notes:           [facility]



               5-05                               NSAID.           l



               22:00:                               Please           [place]



               00                                 check           



                                                  indication           



                                                  . Not for           



                                                  seizure.           



                                                  (Same As:           



                                                  CeleBREX)           

 

     Vancomycin      2018-0      No                        2001 mg:           [facility]

[facility]



               5-05                               infuse           l



               21:00:                               over 2.5           [place]



               00                                 hours           

 

     Vancomycin      2018-0      No                        2001 mg:           Me

[facility]



               5-05                               infuse           l



               21:00:                               over 2.5           [facility]



               00                                 hours           

 

     Vancomycin      2018-0      No                        2001 mg:           Me

[facility]



               5-05                               infuse           l



               21:00:                               over 2.5           [place]



               00                                 hours           

 

     Vancomycin      2018-0      No                        2001 mg:           Me

[facility]



               5-05                               infuse           l



               21:00:                               over 2.5           [place]



               00                                 hours           

 

     Vancomycin      2018-0      No                        2001 mg:           Me

[facility]



               5-05                               infuse           l



               21:00:                               over 2.5           [place]



               00                                 hours           

 

     Vancomycin      2018-0      No                        2001 mg:           Me

[facility]



               5-05                               infuse           l



               21:00:                               over 2.5           [place]



               00                                 hours           

 

     Vancomycin      2018-0      No                        2001 mg:           Me

[facility]



               5-05                               infuse           l



               21:00:                               over 2.5           [place]



               00                                 hours           

 

     Vancomycin      2018-0      No                        2001 mg:           Me

[facility]



               5-05                               infuse           l



               21:00:                               over 2.5           [place]



               00                                 hours           

 

     Lidocaine      2018-0      No                       Notes:           [facility]

a



     Hydrochlori      5-05                               Apply only           l



     de 0.05      14:00:                               once for           [facility]

n



     MG/MG      00                                 up to 12           



     Transdermal                                         hours in a           



     Patch                                         24-hour           



     [Lidoderm]                                         period (12           



                                                  hours on           



                                                  and 12           



                                                  hours           



                                                  off).           



                                                  (Same as:           



                                                  Lidoderm)           



                                                  "Remove           



                                                  old patch           



                                                  before           



                                                  applicatio           



                                                  n of new           



                                                  patch"           

 

     Lidocaine      -0      No                       Notes:           [facility]

a



     Hydrochlori      5-05                               Apply only           l



     de 0.05      14:00:                               once for           [facility]

n



     MG/MG      00                                 up to 12           



     Transdermal                                         hours in a           



     Patch                                         24-hour           



     [Lidoderm]                                         period (12           



                                                  hours on           



                                                  and 12           



                                                  hours           



                                                  off).           



                                                  (Same as:           



                                                  Lidoderm)           



                                                  "Remove           



                                                  old patch           



                                                  before           



                                                  applicatio           



                                                  n of new           



                                                  patch"           

 

     Lidocaine      2018-0      No                       Notes:           [facility]

a



     Hydrochlori      5-05                               Apply only           l



     de 0.05      14:00:                               once for           [facility]

n



     MG/MG      00                                 up to 12           



     Transdermal                                         hours in a           



     Patch                                         24-hour           



     [Lidoderm]                                         period (12           



                                                  hours on           



                                                  and 12           



                                                  hours           



                                                  off).           



                                                  (Same as:           



                                                  Lidoderm)           



                                                  "Remove           



                                                  old patch           



                                                  before           



                                                  applicatio           



                                                  n of new           



                                                  patch"           

 

     Lidocaine      2018-0      No                       Notes:           [facility]

a



     Hydrochlori      5-05                               Apply only           l



     de 0.05      14:00:                               once for           [facility]

n



     MG/MG      00                                 up to 12           



     Transdermal                                         hours in a           



     Patch                                         24-hour           



     [Lidoderm]                                         period (12           



                                                  hours on           



                                                  and 12           



                                                  hours           



                                                  off).           



                                                  (Same as:           



                                                  Lidoderm)           



                                                  "Remove           



                                                  old patch           



                                                  before           



                                                  applicatio           



                                                  n of new           



                                                  patch"           

 

     Lidocaine            No                       Notes:           [facility]

a



     Hydrochlori      5-05                               Apply only           l



     de 0.05      14:00:                               once for           [facility]

n



     MG/MG      00                                 up to 12           



     Transdermal                                         hours in a           



     Patch                                         24-hour           



     [Lidoderm]                                         period (12           



                                                  hours on           



                                                  and 12           



                                                  hours           



                                                  off).           



                                                  (Same as:           



                                                  Lidoderm)           



                                                  "Remove           



                                                  old patch           



                                                  before           



                                                  applicatio           



                                                  n of new           



                                                  patch"           

 

     Lidocaine            No                       Notes:           [facility]

a



     Hydrochlori      5-05                               Apply only           l



     de 0.05      14:00:                               once for           [facility]

n



     MG/MG      00                                 up to 12           



     Transdermal                                         hours in a           



     Patch                                         24-hour           



     [Lidoderm]                                         period (12           



                                                  hours on           



                                                  and 12           



                                                  hours           



                                                  off).           



                                                  (Same as:           



                                                  Lidoderm)           



                                                  "Remove           



                                                  old patch           



                                                  before           



                                                  applicatio           



                                                  n of new           



                                                  patch"           

 

     Lidocaine            No                       Notes:           [facility]

a



     Hydrochlori      5-05                               Apply only           l



     de 0.05      14:00:                               once for           [facility]

n



     MG/MG      00                                 up to 12           



     Transdermal                                         hours in a           



     Patch                                         24-hour           



     [Lidoderm]                                         period (12           



                                                  hours on           



                                                  and 12           



                                                  hours           



                                                  off).           



                                                  (Same as:           



                                                  Lidoderm)           



                                                  "Remove           



                                                  old patch           



                                                  before           



                                                  applicatio           



                                                  n of new           



                                                  patch"           

 

     Lidocaine            No                       Notes:           [facility]

a



     Hydrochlori      5-05                               Apply only           l



     de 0.05      14:00:                               once for           [facility]

n



     MG/MG      00                                 up to 12           



     Transdermal                                         hours in a           



     Patch                                         24-hour           



     [Lidoderm]                                         period (12           



                                                  hours on           



                                                  and 12           



                                                  hours           



                                                  off).           



                                                  (Same as:           



                                                  Lidoderm)           



                                                  "Remove           



                                                  old patch           



                                                  before           



                                                  applicatio           



                                                  n of new           



                                                  patch"           

 

     Phenergan            No                       Notes: Do           [facility]

[facility]



               5-04                               not give           l



               22:40:                               IV push.           [facility]



               00                                 (Same as:           



                                                  Phenergan)           

 

     Dilaudid            No                       Notes:           [facility]



               5-04                               Same as           l



               22:40:                               Dilaudid           [place]



               00                                                

 

     Phenergan            No                       Notes: Do           [facility]

[facility]



               5-04                               not give           l



               22:40:                               IV push.           [facility]



               00                                 (Same as:           



                                                  Phenergan)           

 

     Dilaudid            No                       Notes:           [facility]



               5-04                               Same as           l



               22:40:                               Dilaudid           [place]



               00                                                

 

     Phenergan            No                       Notes: Do           [facility]

[facility]



               5-04                               not give           l



               22:40:                               IV push.           [place]



               00                                 (Same as:           



                                                  Phenergan)           

 

     Dilaudid            No                       Notes:           [facility]



               5-04                               Same as           l



               22:40:                               Dilaudid           [place]



               00                                                

 

     Phenergan            No                       Notes: Do           [facility]

[facility]



               5-04                               not give           l



               22:40:                               IV push.           [place]



               00                                 (Same as:           



                                                  Phenergan)           

 

     Dilaudid            No                       Notes:           [facility]



               5-04                               Same as           l



               22:40:                               Dilaudid           [place]



               00                                                

 

     Phenergan            No                       Notes: Do           [facility]

[facility]



               5-04                               not give           l



               22:40:                               IV push.           [facility]



               00                                 (Same as:           



                                                  Phenergan)           

 

     Dilaudid            No                       Notes:           [facility]



               5-04                               Same as           l



               22:40:                               Dilaudid           [place]



               00                                                

 

     Phenergan            No                       Notes: Do           [facility]

[facility]



               5-04                               not give           l



               22:40:                               IV push.           [place]



               00                                 (Same as:           



                                                  Phenergan)           

 

     Dilaudid            No                       Notes:           [facility]



               5-04                               Same as           l



               22:40:                               Dilaudid           [place]



               00                                                

 

     Phenergan            No                       Notes: Do           [facility]

[facility]



               5-04                               not give           l



               22:40:                               IV push.           [place]



               00                                 (Same as:           



                                                  Phenergan)           

 

     Dilaudid            No                       Notes:           [facility]



               5-04                               Same as           l



               22:40:                               Dilaudid           [place]



               00                                                

 

     Phenergan            No                       Notes: Do           [facility]

[facility]



               5-04                               not give           l



               22:40:                               IV push.           [facility]



               00                                 (Same as:           



                                                  Phenergan)           

 

     Dilaudid            No                       Notes:           [facility]



               5-04                               Same as           l



               22:40:                               Dilaudid           [place]



               00                                                

 

     Tramadol            No                       Notes: Not           [facility]

[facility]



               5-04                               to exceed           l



               22:00:                               400mg/day.           [place]



               00                                 (Same As:           



                                                  Ultram)           

 

     gabapentin            No                       Notes:           [facility]

[facility]



               5-04                               (Same as:           l



               22:00:                               Neurontin)           [place]



                                                               

 

     Acetaminoph            No                       Notes: Max           

[facility]



     en        5-04                               acetaminop           l



               22:00:                               hen 4000           [place]



               00                                 mg/day (4           



                                                  gm/day).           



                                                  (Same as:           



                                                  Tylenol           



                                                  Extra           



                                                  Strength)           

 

     Robaxin            No                       Notes:           [facility]



               5-04                               (Same           l



               22:00:                               as:Robaxin           [place]



                                                )              

 

     Tramadol      0      No                       Notes: Not           [facility]

[facility]



               5-04                               to exceed           l



               22:00:                               400mg/day.           [facility]



               00                                 (Same As:           



                                                  Ultram)           

 

     gabapentin            No                       Notes:           [facility]

[facility]



               5-04                               (Same as:           l



               22:00:                               Neurontin)           [place]



                                                               

 

     Acetaminoph            No                       Notes: Max           

[facility]



     en        5-04                               acetaminop           l



               22:00:                               hen 4000           [place]



               00                                 mg/day (4           



                                                  gm/day).           



                                                  (Same as:           



                                                  Tylenol           



                                                  Extra           



                                                  Strength)           

 

     Robaxin            No                       Notes:           [facility]



               5-04                               (Same           l



               22:00:                               as:Robaxin           [place]



                                                )              

 

     Tramadol            No                       Notes: Not           [facility]

[facility]



               5-04                               to exceed           l



               22:00:                               400mg/day.           [place]



               00                                 (Same As:           



                                                  Ultram)           

 

     gabapentin            No                       Notes:           [facility]

[facility]



               5-04                               (Same as:           l



               22:00:                               Neurontin)           [place]



                                                               

 

     Acetaminoph            No                       Notes: Max           

[facility]



     en        5-04                               acetaminop           l



               22:00:                               hen 4000           [place]



               00                                 mg/day (4           



                                                  gm/day).           



                                                  (Same as:           



                                                  Tylenol           



                                                  Extra           



                                                  Strength)           

 

     Robaxin            No                       Notes:           [facility]



               5-04                               (Same           l



               22:00:                               as:Robaxin           [place]



                                                )              

 

     Tramadol            No                       Notes: Not           [facility]

[facility]



               5-04                               to exceed           l



               22:00:                               400mg/day.           [place]



               00                                 (Same As:           



                                                  [facility])           

 

     gabapentin            No                       Notes:           [facility]

[facility]



               5-04                               (Same as:           l



               22:00:                               Neurontin)           [place]



               00                                                

 

     Acetaminoph            No                       Notes: Max           

[facility]



     en        5-04                               acetaminop           l



               22:00:                               hen 4000           [place]



               00                                 mg/day (4           



                                                  gm/day).           



                                                  (Same as:           



                                                  Tylenol           



                                                  Extra           



                                                  Strength)           

 

     Robaxin            No                       Notes:           [facility]



               5-04                               (Same           l



               22:00:                               as:Robaxin           [place]



               00                                 )              

 

     Tramadol      0      No                       Notes: Not           [facility]

[facility]



               5-04                               to exceed           l



               22:00:                               400mg/day.           [place]



               00                                 (Same As:           



                                                  Ultram)           

 

     gabapentin            No                       Notes:           [facility]

[facility]



               5-04                               (Same as:           l



               22:00:                               Neurontin)           [place]



                                                               

 

     Acetaminoph            No                       Notes: Max           

[facility]



     en        5-04                               acetaminop           l



               22:00:                               hen 4000           [place]



               00                                 mg/day (4           



                                                  gm/day).           



                                                  (Same as:           



                                                  Tylenol           



                                                  Extra           



                                                  Strength)           

 

     Robaxin            No                       Notes:           [facility]



               5-04                               (Same           l



               22:00:                               as:Robaxin           [place]



               00                                 )              

 

     Tramadol            No                       Notes: Not           [facility]

[facility]



               5-04                               to exceed           l



               22:00:                               400mg/day.           [place]



               00                                 (Same As:           



                                                  Ultram)           

 

     gabapentin            No                       Notes:           [facility]

[facility]



               5-04                               (Same as:           l



               22:00:                               Neurontin)           [place]



                                                               

 

     Acetaminoph            No                       Notes: Max           

[facility]



     en        5-04                               acetaminop           l



               22:00:                               hen 4000           [place]



               00                                 mg/day (4           



                                                  gm/day).           



                                                  (Same as:           



                                                  Tylenol           



                                                  Extra           



                                                  Strength)           

 

     Robaxin            No                       Notes:           [facility]



               5-04                               (Same           l



               22:00:                               as:Robaxin           [place]



                                                )              

 

     Tramadol            No                       Notes: Not           [facility]

[facility]



               5-04                               to exceed           l



               22:00:                               400mg/day.           [place]



               00                                 (Same As:           



                                                  Ultram)           

 

     gabapentin            No                       Notes:           [facility]

[facility]



               5-04                               (Same as:           l



               22:00:                               Neurontin)           [place]



                                                               

 

     Acetaminoph            No                       Notes: Max           

[facility]



     en        5-04                               acetaminop           l



               22:00:                               hen 4000           [place]



               00                                 mg/day (4           



                                                  gm/day).           



                                                  (Same as:           



                                                  Tylenol           



                                                  Extra           



                                                  Strength)           

 

     Robaxin            No                       Notes:           [facility]



               5-04                               (Same           l



               22:00:                               as:Robaxin           [place]



               00                                 )              

 

     Tramadol            No                       Notes: Not           [facility]

[facility]



               5-04                               to exceed           l



               22:00:                               400mg/day.           [place]



               00                                 (Same As:           



                                                  Ultram)           

 

     gabapentin            No                       Notes:           [facility]

[facility]



               5-04                               (Same as:           l



               22:00:                               Neurontin)           [place]



                                                               

 

     Acetaminoph            No                       Notes: Max           

[facility]



     en        5-04                               acetaminop           l



               22:00:                               hen 4000           [place]



               00                                 mg/day (4           



                                                  gm/day).           



                                                  (Same as:           



                                                  Tylenol           



                                                  Extra           



                                                  Strength)           

 

     Robaxin            No                       Notes:           [facility]



               5-04                               (Same           l



               22:00:                               as:Robaxin           [place]



               00                                 )              

 

     Oxycodone            No                       Notes:           [facility]

a



     Hydrochlori      5-04                               (Same as:           l



     de 5 MG      21:33:                               Roxicodone           [facility]

[facility]



     Oral Tablet      00                                 )              

 

     Oxycodone            No                       Notes:           [facility]

a



     Hydrochlori      5-04                               (Same as:           l



     de 5 MG      21:33:                               Roxicodone           [facility]

[place]



     Oral Tablet      00                                 )              

 

     Oxycodone            No                       Notes:           [facility]

a



     Hydrochlori      5-04                               (Same as:           l



     de 5 MG      21:33:                               Roxicodone           [facility]

[place]



     Oral Tablet      00                                 )              

 

     Oxycodone            No                       Notes:           [facility]

a



     Hydrochlori      5-04                               (Same as:           l



     de 5 MG      21:33:                               Roxicodone           [facility]

[place]



     Oral Tablet      00                                 )              

 

     Oxycodone            No                       Notes:           [facility]

a



     Hydrochlori      5-04                               (Same as:           l



     de 5 MG      21:33:                               Roxicodone           [facility]

[place]



     Oral Tablet      00                                 )              

 

     Oxycodone            No                       Notes:           [facility]

a



     Hydrochlori      5-04                               (Same as:           l



     de 5 MG      21:33:                               Roxicodone           [facility]

[place]



     Oral Tablet      00                                 )              

 

     Oxycodone            No                       Notes:           [facility]

a



     Hydrochlori      5-04                               (Same as:           l



     de 5 MG      21:33:                               Roxicodone           [facility]

[place]



     Oral Tablet      00                                 )              

 

     Oxycodone            No                       Notes:           [facility]

a



     Hydrochlori      5-04                               (Same as:           l



     de 5 MG      21:33:                               Roxicodone           [facility]

[place]



     Oral Tablet      00                                 )              

 

     Beneprotein            No                       Notes:           [facility]

[facility]



     7 gm pkt      5-04                               (Same as:           l



               21:30:                               Beneprotei           [place]



               00                                 n)             

 

     Beneprotein            No                       Notes:           [facility]

rajeev



     7 gm pkt      5-04                               (Same as:           l



               21:30:                               Beneprotei           [place]



               00                                 n)             

 

     Beneprotein            No                       Notes:           [facility]

ra[facility]ev



     7 gm pkt      5-04                               (Same as:           l



               21:30:                               Beneprotei           [place]



               00                                 n)             

 

     Beneprotein            No                       Notes:           [facility]

ra[facility]ev



     7 gm pkt      5-04                               (Same as:           l



               21:30:                               Beneprotei           [place]



               00                                 n)             

 

     Beneprotein            No                       Notes:           [facility]

ra[facility]ev



     7 gm pkt      5-04                               (Same as:           l



               21:30:                               Beneprotei           [place]



               00                                 n)             

 

     Beneprotein            No                       Notes:           [facility]

ra[facility]ev



     7 gm pkt      5-04                               (Same as:           l



               21:30:                               Beneprotei           [place]



               00                                 n)             

 

     Beneprotein            No                       Notes:           [facility]

ra[facility]ev



     7 gm pkt      5-04                               (Same as:           l



               21:30:                               Beneprotei           [place]



               00                                 n)             

 

     Beneprotein            No                       Notes:           [facility]

ra[facility]ev



     7 gm pkt      5-04                               (Same as:           l



               21:30:                               Beneprotei           [place]



               00                                 n)             

 

     Acetaminoph            No                       1 tab, PO,           

[facility]



     en 325 MG /      5-04                               TID, 0           l



     Oxycodone      17:12:                               Refill(s)           [facility]

[facility]



     Hydrochlori      00                                                



     de 10 MG                                                        



     Oral Tablet                                                        



     [Percocet                                                        



     10/325]                                                        

 

     Acetaminoph            No                       1 tab, PO,           

[facility]



     en 325 MG /      5-04                               TID, 0           l



     Oxycodone      17:12:                               Refill(s)           [facility]

[facility]



     Hydrochlori      00                                                



     de 10 MG                                                        



     Oral Tablet                                                        



     [Percocet                                                        



     10/325]                                                        

 

     Acetaminoph            No                       1 tab, PO,           

[facility]



     en 325 MG /      5-04                               TID, 0           l



     Oxycodone      17:12:                               Refill(s)           Her

child



     Hydrochlori      00                                                



     de 10 MG                                                        



     Oral Tablet                                                        



     [Percocet                                                        



     10/325]                                                        

 

     Acetaminoph            No                       1 tab, PO,           

[facility]



     en 325 MG /      5-04                               TID, 0           l



     Oxycodone      17:12:                               Refill(s)           Her

child



     Hydrochlori      00                                                



     de 10 MG                                                        



     Oral Tablet                                                        



     [Percocet                                                        



     10/325]                                                        

 

     Acetaminoph            No                       1 tab, PO,           

[facility]



     en 325 MG /      5-04                               TID, 0           l



     Oxycodone      17:12:                               Refill(s)           Her

child



     Hydrochlori      00                                                



     de 10 MG                                                        



     Oral Tablet                                                        



     [Percocet                                                        



     10/325]                                                        

 

     Acetaminoph            No                       1 tab, PO,           

[facility]



     en 325 MG /      5-04                               TID, 0           l



     Oxycodone      17:12:                               Refill(s)           Her

child



     Hydrochlori      00                                                



     de 10 MG                                                        



     Oral Tablet                                                        



     [Percocet                                                        



     10/325]                                                        

 

     Acetaminoph            No                       1 tab, PO,           

[facility]



     en 325 MG /      5-04                               TID, 0           l



     Oxycodone      17:12:                               Refill(s)           Her

[facility]



     Hydrochlori      00                                                



     de 10 MG                                                        



     Oral Tablet                                                        



     [Percocet                                                        



     10/325]                                                        

 

     Acetaminoph      2018-0      No                       1 tab, PO,           

[facility]



     en 325 MG /      5-04                               TID, 0           l



     Oxycodone      17:12:                               Refill(s)           Her

[facility]



     Hydrochlori      00                                                



     de 10 MG                                                        



     Oral Tablet                                                        



     [Percocet                                                        



     10/325]                                                        

 

     Dilaudid      2018-0      No                       0.5 mg,           [facility]

a



               5-04                               0.25 mL,           l



               16:01:                               Route:           [facility]



                                                IVP, Drug           



                                                  form: INJ,           



                                                  ONCE,           



                                                  Start           



                                                  date:           



                                                  18           



                                                  11:01:00           



                                                  CDT, Stop           



                                                  date:           



                                                  18           



                                                  11:01:00           



                                                  CDT            

 

     Dilaudid      2018-0      No                       0.5 mg,           [facility]

a



               5-04                               0.25 mL,           l



               16:01:                               Route:                                            IVP, Drug           



                                                  form: INJ,           



                                                  ONCE,           



                                                  Start           



                                                  date:           



                                                  18           



                                                  11:01:00           



                                                  CDT, Stop           



                                                  date:           



                                                  18           



                                                  11:01:00           



                                                  CDT            

 

     Dilaudid      2018-0      No                       0.5 mg,           [facility]

a



               5-04                               0.25 mL,           l



               16:01:                               Route:                                            IVP, Drug           



                                                  form: INJ,           



                                                  ONCE,           



                                                  Start           



                                                  date:           



                                                  18           



                                                  11:01:00           



                                                  CDT, Stop           



                                                  date:           



                                                  18           



                                                  11:01:00           



                                                  CDT            

 

     Dilaudid      2018-0      No                       0.5 mg,           [facility]

a



               5-04                               0.25 mL,           l



               16:01:                               Route:                                            IVP, Drug           



                                                  form: INJ,           



                                                  ONCE,           



                                                  Start           



                                                  date:           



                                                  18           



                                                  11:01:00           



                                                  CDT, Stop           



                                                  date:           



                                                  18           



                                                  11:01:00           



                                                  CDT            

 

     Dilaudid      2018-0      No                       0.5 mg,           [facility]

a



               5-04                               0.25 mL,           l



               16:01:                               Route:                                            IVP, Drug           



                                                  form: INJ,           



                                                  ONCE,           



                                                  Start           



                                                  date:           



                                                  18           



                                                  11:01:00           



                                                  CDT, Stop           



                                                  date:           



                                                  18           



                                                  11:01:00           



                                                  CDT            

 

     Dilaudid      2018-0      No                       0.5 mg,           [facility]

a



               5-04                               0.25 mL,           l



               16:01:                               Route:                                            IVP, Drug           



                                                  form: INJ,           



                                                  ONCE,           



                                                  Start           



                                                  date:           



                                                  18           



                                                  11:01:00           



                                                  CDT, Stop           



                                                  date:           



                                                  18           



                                                  11:01:00           



                                                  CDT            

 

     Dilaudid      2018-0      No                       0.5 mg,           [facility]

a



               5-04                               0.25 mL,           l



               16:01:                               Route:           [place]



               00                                 IVP, Drug           



                                                  form: INJ,           



                                                  ONCE,           



                                                  Start           



                                                  date:           



                                                  18           



                                                  11:01:00           



                                                  CDT, Stop           



                                                  date:           



                                                  18           



                                                  11:01:00           



                                                  CDT            

 

     Dilaudid      2018-0      No                       0.5 mg,           [facility]

a



               5-04                               0.25 mL,           l



               16:01:                               Route:           [place]



               00                                 IVP, Drug           



                                                  form: INJ,           



                                                  ONCE,           



                                                  Start           



                                                  date:           



                                                  18           



                                                  11:01:00           



                                                  CDT, Stop           



                                                  date:           



                                                  18           



                                                  11:01:00           



                                                  CDT            

 

     Phenergan      2018-0      No                       Notes:           [facility]

a



               5-04                               (Same as:           l



               15:43:                               Phenergan)                                                           

 

     Dilaudid      2018-0      No                       2 mg,           [facility]



               5-04                               Route:           l



               15:43:                               IVP, ONCE,                                            Dosing           



                                                  Weight           



                                                  127.027,           



                                                  kg,            



                                                  Priority:           



                                                  STAT,           



                                                  Start           



                                                  date:           



                                                  18           



                                                  10:43:00           



                                                  CDT, Stop           



                                                  date:           



                                                  18           



                                                  10:43:00           



                                                  CDT            

 

     Phenergan      2018-0      No                       Notes:           [facility]

a



               5-04                               (Same as:           l



               15:43:                               Phenergan)                                                           

 

     Dilaudid      2018-0      No                       2 mg,           [facility]



               5-04                               Route:           l



               15:43:                               IVP, ONCE,                                            Dosing           



                                                  Weight           



                                                  127.027,           



                                                  kg,            



                                                  Priority:           



                                                  STAT,           



                                                  Start           



                                                  date:           



                                                  18           



                                                  10:43:00           



                                                  CDT, Stop           



                                                  date:           



                                                  18           



                                                  10:43:00           



                                                  CDT            

 

     Phenergan      2018-0      No                       Notes:           [facility]

a



               5-04                               (Same as:           l



               15:43:                               Phenergan)           [facility]                                                

 

     Dilaudid      2018-0      No                       2 mg,           [facility]



               5-04                               Route:           l



               15:43:                               IVP, ONCE,                                            Dosing           



                                                  Weight           



                                                  127.027,           



                                                  kg,            



                                                  Priority:           



                                                  STAT,           



                                                  Start           



                                                  date:           



                                                  18           



                                                  10:43:00           



                                                  CDT, Stop           



                                                  date:           



                                                  18           



                                                  10:43:00           



                                                  CDT            

 

     Phenergan      2018-0      No                       Notes:           [facility]

a



               5-04                               (Same as:           l



               15:43:                               Phenergan)                                                           

 

     Dilaudid      -0      No                       2 mg,           [facility]



               5-04                               Route:           l



               15:43:                               IVP, ONCE,           [place]



               00                                 Dosing           



                                                  Weight           



                                                  127.027,           



                                                  kg,            



                                                  Priority:           



                                                  STAT,           



                                                  Start           



                                                  date:           



                                                  18           



                                                  10:43:00           



                                                  CDT, Stop           



                                                  date:           



                                                  18           



                                                  10:43:00           



                                                  CDT            

 

     Phenergan      2018-0      No                       Notes:           [facility]

a



               5-04                               (Same as:           l



               15:43:                               Phenergan)                                                           

 

     Dilaudid      -0      No                       2 mg,           [facility]



               5-04                               Route:           l



               15:43:                               IVP, ONCE,           [facility]



                                                Dosing           



                                                  Weight           



                                                  127.027,           



                                                  kg,            



                                                  Priority:           



                                                  STAT,           



                                                  Start           



                                                  date:           



                                                  18           



                                                  10:43:00           



                                                  CDT, Stop           



                                                  date:           



                                                  18           



                                                  10:43:00           



                                                  CDT            

 

     Phenergan      2018-0      No                       Notes:           [facility]

a



               5-04                               (Same as:           l



               15:43:                               Phenergan)                                                           

 

     Dilaudid      -0      No                       2 mg,           [facility]



               5-04                               Route:           l



               15:43:                               IVP, ONCE,           [place]                                 Dosing           



                                                  Weight           



                                                  127.027,           



                                                  kg,            



                                                  Priority:           



                                                  STAT,           



                                                  Start           



                                                  date:           



                                                  18           



                                                  10:43:00           



                                                  CDT, Stop           



                                                  date:           



                                                  18           



                                                  10:43:00           



                                                  CDT            

 

     Phenergan      2018-0      No                       Notes:           [facility]

a



               5-04                               (Same as:           l



               15:43:                               Phenergan)                                                           

 

     Dilaudid      -0      No                       2 mg,           [facility]



               5-04                               Route:           l



               15:43:                               IVP, ONCE,           [facility]



               00                                 Dosing           



                                                  Weight           



                                                  127.027,           



                                                  kg,            



                                                  Priority:           



                                                  STAT,           



                                                  Start           



                                                  date:           



                                                  18           



                                                  10:43:00           



                                                  CDT, Stop           



                                                  date:           



                                                  18           



                                                  10:43:00           



                                                  CDT            

 

     Phenergan      2018-0      No                       Notes:           [facility]

a



               5-04                               (Same as:           l



               15:43:                               Phenergan)           [facility]                                                

 

     Dilaudid      -0      No                       2 mg,           [facility]



               5-04                               Route:           l



               15:43:                               IVP, ONCE,           [facility]



               00                                 Dosing           



                                                  Weight           



                                                  127.027,           



                                                  kg,            



                                                  Priority:           



                                                  STAT,           



                                                  Start           



                                                  date:           



                                                  18           



                                                  10:43:00           



                                                  CDT, Stop           



                                                  date:           



                                                  18           



                                                  10:43:00           



                                                  CDT            

 

     Docusate            No                       Notes:           [facility]



               5-04                               (Same as:           l



               14:00:                               Colace)           [place]



                                                (Do Not           



                                                  Crush)           

 

     Zinc            No                       Notes:           [facility]



     Sulfate      5-04                               (Zinc           l



               14:00:                               sulfate           [place]



               00                                 capsule) -           



                                                  220 mg           



                                                  Zinc           



                                                  sulfate =           



                                                  50 mg           



                                                  elemental           



                                                  zinc Same           



                                                  as Zinc           



                                                  Sulfate           

 

     ascorbic            No                       Notes:           [facility]



     acid      5-04                               (Same as:           l



               14:00:                               Vitamin C)           [place]



                                                               

 

     multivitami            No                       Notes:           [facility]

[facility]



     n         5-04                               (Same           l



               14:00:                               as:[facility])           [place]



                                                WASTE: F/P           



                                                  - Black; E           



                                                  -              



                                                  Municipal           



                                                  Trash Bin           



                                                  Take with           



                                                  food.           

 

     Docusate            No                       Notes:           [facility]



               5-04                               (Same as:           l



               14:00:                               Colace)           [facility]



                                                (Do Not           



                                                  Crush)           

 

     Zinc            No                       Notes:           [facility]



     Sulfate      5-04                               (Zinc           l



               14:00:                               sulfate           [place]



               00                                 capsule) -           



                                                  220 mg           



                                                  Zinc           



                                                  sulfate =           



                                                  50 mg           



                                                  elemental           



                                                  zinc Same           



                                                  as Zinc           



                                                  Sulfate           

 

     ascorbic            No                       Notes:           [facility]



     acid      5-04                               (Same as:           l



               14:00:                               Vitamin C)           [place]



                                                               

 

     multivitami            No                       Notes:           [facility]

[facility]



     n         5-04                               (Same           l



               14:00:                               as:[facility]ra)           [facility]



                                                WASTE: F/P           



                                                  - Black; E           



                                                  -              



                                                  Municipal           



                                                  Trash Bin           



                                                  Take with           



                                                  food.           

 

     Docusate            No                       Notes:           [facility]



               5-04                               (Same as:           l



               14:00:                               Colace)           [place]



                                                (Do Not           



                                                  Crush)           

 

     Zinc            No                       Notes:           [facility]



     Sulfate      5-04                               (Zinc           l



               14:00:                               sulfate           [facility]



               00                                 capsule) -           



                                                  220 mg           



                                                  Zinc           



                                                  sulfate =           



                                                  50 mg           



                                                  elemental           



                                                  zinc Same           



                                                  as Zinc           



                                                  Sulfate           

 

     ascorbic            No                       Notes:           [facility]



     acid      5-04                               (Same as:           l



               14:00:                               Vitamin C)           [place]



                                                               

 

     multivitami            No                       Notes:           [facility]

[facility]



     n         5-04                               (Same           l



               14:00:                               as:[facility])           [place]



                                                WASTE: F/P           



                                                  - Black; E           



                                                  -              



                                                  Municipal           



                                                  Trash Bin           



                                                  Take with           



                                                  food.           

 

     Docusate            No                       Notes:           [facility]



               5-04                               (Same as:           l



               14:00:                               Colace)           [facility]



               00                                 (Do Not           



                                                  Crush)           

 

     Zinc            No                       Notes:           [facility]



     Sulfate      5-04                               (Zinc           l



               14:00:                               sulfate           [facility]



               00                                 capsule) -           



                                                  220 mg           



                                                  Zinc           



                                                  sulfate =           



                                                  50 mg           



                                                  elemental           



                                                  zinc Same           



                                                  as Zinc           



                                                  Sulfate           

 

     ascorbic            No                       Notes:           [facility]



     acid      5-04                               (Same as:           l



               14:00:                               Vitamin C)           [place]



                                                               

 

     multivitami            No                       Notes:           [facility]

[facility]



     n         5-04                               (Same           l



               14:00:                               as:[facility])           [facility]



                                                WASTE: F/P           



                                                  - Black; E           



                                                  -              



                                                  Municipal           



                                                  Trash Bin           



                                                  Take with           



                                                  food.           

 

     Docusate            No                       Notes:           [facility]



               5-04                               (Same as:           l



               14:00:                               Colace)           [facility]



                                                (Do Not           



                                                  Crush)           

 

     Zinc            No                       Notes:           [facility]



     Sulfate      5-04                               (Zinc           l



               14:00:                               sulfate           [place]



               00                                 capsule) -           



                                                  220 mg           



                                                  Zinc           



                                                  sulfate =           



                                                  50 mg           



                                                  elemental           



                                                  zinc Same           



                                                  as Zinc           



                                                  Sulfate           

 

     ascorbic            No                       Notes:           [facility]



     acid      5-04                               (Same as:           l



               14:00:                               Vitamin C)           [place]



                                                               

 

     multivitami            No                       Notes:           [facility]

[facility]



     n         5-04                               (Same           l



               14:00:                               as:Thera)           [place]



                                                WASTE: F/P           



                                                  - Black; E           



                                                  -              



                                                  Municipal           



                                                  Trash Bin           



                                                  Take with           



                                                  food.           

 

     Docusate            No                       Notes:           [facility]



               5-04                               (Same as:           l



               14:00:                               Colace)           [place]



                                                (Do Not           



                                                  Crush)           

 

     Zinc            No                       Notes:           [facility]



     Sulfate      5-04                               (Zinc           l



               14:00:                               sulfate           [facility]



               00                                 capsule) -           



                                                  220 mg           



                                                  Zinc           



                                                  sulfate =           



                                                  50 mg           



                                                  elemental           



                                                  zinc Same           



                                                  as Zinc           



                                                  Sulfate           

 

     ascorbic            No                       Notes:           [facility]



     acid      5-04                               (Same as:           l



               14:00:                               Vitamin C)           [place]



               00                                                

 

     multivitami            No                       Notes:           [facility]

ra[facility]ev



     n         5-04                               (Same           l



               14:00:                               as:Thera)           [place]



                                                WASTE: F/P           



                                                  - Black; E           



                                                  -              



                                                  Municipal           



                                                  Trash Bin           



                                                  Take with           



                                                  food.           

 

     Docusate            No                       Notes:           [facility]



               5-04                               (Same as:           l



               14:00:                               Colace)           [facility]



                                                (Do Not           



                                                  Crush)           

 

     Zinc            No                       Notes:           [facility]



     Sulfate      5-04                               (Zinc           l



               14:00:                               sulfate           [facility]



               00                                 capsule) -           



                                                  220 mg           



                                                  Zinc           



                                                  sulfate =           



                                                  50 mg           



                                                  elemental           



                                                  zinc Same           



                                                  as Zinc           



                                                  Sulfate           

 

     ascorbic            No                       Notes:           [facility]



     acid      5-04                               (Same as:           l



               14:00:                               Vitamin C)           [place]                                                

 

     multivitami            No                       Notes:           [facility]

[facility]



     n         5-04                               (Same           l



               14:00:                               as:Thera)           [place]                                 WASTE: F/P           



                                                  - Black; E           



                                                  -              



                                                  Municipal           



                                                  Trash Bin           



                                                  Take with           



                                                  food.           

 

     Docusate            No                       Notes:           [facility]



               5-04                               (Same as:           l



               14:00:                               Colace)           [place]                                 (Do Not           



                                                  Crush)           

 

     Zinc            No                       Notes:           [facility]



     Sulfate      5-04                               (Zinc           l



               14:00:                               sulfate           [facility]



                                                capsule) -           



                                                  220 mg           



                                                  Zinc           



                                                  sulfate =           



                                                  50 mg           



                                                  elemental           



                                                  zinc Same           



                                                  as Zinc           



                                                  Sulfate           

 

     ascorbic            No                       Notes:           [facility]



     acid      5-04                               (Same as:           l



               14:00:                               Vitamin C)           [facility]                                                

 

     multivitami            No                       Notes:           [facility]

[facility]



     n         5-04                               (Same           l



               14:00:                               as:Thera)           [place]                                 WASTE: F/P           



                                                  - Black; E           



                                                  -              



                                                  Municipal           



                                                  Trash Bin           



                                                  Take with           



                                                  food.           

 

     Naproxen            No                       Notes:           [facility]



               5-04                               (Same as:           l



               07:00:                               Naprosyn)           [place]                                 Take with           



                                                  food.           

 

     Zosyn            No                       Notes:           [facility]



               5-04                               (Same as:           l



               07:00:                               Zosyn)           [facility]                                 Dosing           



                                                  based on           



                                                  Piperac[place]           



                                                  in             



                                                  component           



                                                  ***            



                                                  MEDICATION           



                                                  WASTE ***           



                                                  Product           



                                                  Size: 3375           



                                                  mg Product           



                                                  Wasted:           



                                                  ___ mg           

 

     Vancomycin            No                        2001 mg:           [facility]

[facility]



               5-04                               infuse           l



               07:00:                               over 2.5           [facility]



               00                                 hours For           



                                                  adult           



                                                  patients           



                                                  only:           



                                                  Round to           



                                                  nearest           



                                                  250 mg per           



                                                  Medical           



                                                  Staff           



                                                  approval           



                                                  ***            



                                                  MEDICATION           



                                                  WASTE ***           



                                                  Product           



                                                  Size: 1000           



                                                  mg Product           



                                                  Wasted:           



                                                  ___ mg           

 

     Enoxaparin            No                       Notes:           [facility]

[facility]



               5-04                               (Same as:           l



               07:00:                               Lovenox)           [place]



                                                               

 

     Naproxen            No                       Notes:           [facility]



               5-04                               (Same as:           l



               07:00:                               Naprosyn)           [place]



                                                Take with           



                                                  food.           

 

     Zosyn            No                       Notes:           [facility]



               5-04                               (Same as:           l



               07:00:                               Zosyn)           [place]                                 Dosing           



                                                  based on           



                                                  Piperacill           



                                                  in             



                                                  component           



                                                  ***            



                                                  MEDICATION           



                                                  WASTE ***           



                                                  Product           



                                                  Size: 3375           



                                                  mg Product           



                                                  Wasted:           



                                                  ___ mg           

 

     Vancomycin      2018-0      No                        2001 mg:           Me

[facility]



               5-04                               infuse           l



               07:00:                               over 2.5           [facility]



               00                                 hours For           



                                                  adult           



                                                  patients           



                                                  only:           



                                                  Round to           



                                                  nearest           



                                                  250 mg per           



                                                  Medical           



                                                  Staff           



                                                  approval           



                                                  ***            



                                                  MEDICATION           



                                                  WASTE ***           



                                                  Product           



                                                  Size: 1000           



                                                  mg Product           



                                                  Wasted:           



                                                  ___ mg           

 

     Enoxaparin      2018-0      No                       Notes:           [facility]

[facility]



               5-04                               (Same as:           l



               07:00:                               Lovenox)           [place]



               00                                                

 

     Naproxen      2018-0      No                       Notes:           [facility]



               5-04                               (Same as:           l



               07:00:                               Naprosyn)           [place]



               00                                 Take with           



                                                  food.           

 

     Zosyn      2018-0      No                       Notes:           [facility]



               5-04                               (Same as:           l



               07:00:                               Zosyn)           [facility]



               00                                 Dosing           



                                                  based on           



                                                  Piperacill           



                                                  in             



                                                  component           



                                                  ***            



                                                  MEDICATION           



                                                  WASTE ***           



                                                  Product           



                                                  Size: 3375           



                                                  mg Product           



                                                  Wasted:           



                                                  ___ mg           

 

     Vancomycin      2018-0      No                        2001 mg:           Me

[facility]



               5-04                               infuse           l



               07:00:                               over 2.5           [place]



               00                                 hours For           



                                                  adult           



                                                  patients           



                                                  only:           



                                                  Round to           



                                                  nearest           



                                                  250 mg per           



                                                  Medical           



                                                  Staff           



                                                  approval           



                                                  ***            



                                                  MEDICATION           



                                                  WASTE ***           



                                                  Product           



                                                  Size: 1000           



                                                  mg Product           



                                                  Wasted:           



                                                  ___ mg           

 

     Enoxaparin      2018-0      No                       Notes:           [facility]

[facility]



               5-04                               (Same as:           l



               07:00:                               Lovenox)           [place]



               00                                                

 

     Naproxen      2018-0      No                       Notes:           [facility]



               5-04                               (Same as:           l



               07:00:                               Naprosyn)           [place]



               00                                 Take with           



                                                  food.           

 

     Zosyn      2018-0      No                       Notes:           [facility]



               5-04                               (Same as:           l



               07:00:                               Zosyn)           [place]



               00                                 Dosing           



                                                  based on           



                                                  Piperac[place]           



                                                  in             



                                                  component           



                                                  ***            



                                                  MEDICATION           



                                                  WASTE ***           



                                                  Product           



                                                  Size: 3375           



                                                  mg Product           



                                                  Wasted:           



                                                  ___ mg           

 

     Vancomycin      2018-0      No                        2001 mg:           [facility]

[facility]



               5-04                               infuse           l



               07:00:                               over 2.5           [facility]



               00                                 hours For           



                                                  adult           



                                                  patients           



                                                  only:           



                                                  Round to           



                                                  nearest           



                                                  250 mg per           



                                                  Medical           



                                                  Staff           



                                                  approval           



                                                  ***            



                                                  MEDICATION           



                                                  WASTE ***           



                                                  Product           



                                                  Size: 1000           



                                                  mg Product           



                                                  Wasted:           



                                                  ___ mg           

 

     Enoxaparin      2018-0      No                       Notes:           [facility]

[facility]



               5-04                               (Same as:           l



               07:00:                               Lovenox)           [place]



               00                                                

 

     Naproxen      2018-0      No                       Notes:           [facility]



               5-04                               (Same as:           l



               07:00:                               Naprosyn)           [place]



               00                                 Take with           



                                                  food.           

 

     Zosyn      2018-0      No                       Notes:           [facility]



               5-04                               (Same as:           l



               07:00:                               Zosyn)           [place]



               00                                 Dosing           



                                                  based on           



                                                  Piperacill           



                                                  in             



                                                  component           



                                                  ***            



                                                  MEDICATION           



                                                  WASTE ***           



                                                  Product           



                                                  Size: 3375           



                                                  mg Product           



                                                  Wasted:           



                                                  ___ mg           

 

     Vancomycin      2018-0      No                        2001 mg:           [facility]

[facility]



               5-04                               infuse           l



               07:00:                               over 2.5           [facility]



               00                                 hours For           



                                                  adult           



                                                  patients           



                                                  only:           



                                                  Round to           



                                                  nearest           



                                                  250 mg per           



                                                  Medical           



                                                  Staff           



                                                  approval           



                                                  ***            



                                                  MEDICATION           



                                                  WASTE ***           



                                                  Product           



                                                  Size: 1000           



                                                  mg Product           



                                                  Wasted:           



                                                  ___ mg           

 

     Enoxaparin      2018-0      No                       Notes:           [facility]

[facility]



               5-04                               (Same as:           l



               07:00:                               Lovenox)           [place]



               00                                                

 

     Naproxen      -0      No                       Notes:           [facility]



               5-04                               (Same as:           l



               07:00:                               Naprosyn)           [place]



               00                                 Take with           



                                                  food.           

 

     Zosyn      -0      No                       Notes:           [facility]



               5-04                               (Same as:           l



               07:00:                               Zosyn)           [facility]



               00                                 Dosing           



                                                  based on           



                                                  Piperacill           



                                                  in             



                                                  component           



                                                  ***            



                                                  MEDICATION           



                                                  WASTE ***           



                                                  Product           



                                                  Size: 3375           



                                                  mg Product           



                                                  Wasted:           



                                                  ___ mg           

 

     Vancomycin      2018-0      No                        2001 mg:           [facility]

[facility]



               5-04                               infuse           l



               07:00:                               over 2.5           [place]



               00                                 hours For           



                                                  adult           



                                                  patients           



                                                  only:           



                                                  Round to           



                                                  nearest           



                                                  250 mg per           



                                                  Medical           



                                                  Staff           



                                                  approval           



                                                  ***            



                                                  MEDICATION           



                                                  WASTE ***           



                                                  Product           



                                                  Size: 1000           



                                                  mg Product           



                                                  Wasted:           



                                                  ___ mg           

 

     Enoxaparin      2018-0      No                       Notes:           [facility]

[facility]



               5-04                               (Same as:           l



               07:00:                               Lovenox)           [facility]



               00                                                

 

     Naproxen      2018-0      No                       Notes:           [facility]



               5-04                               (Same as:           l



               07:00:                               Naprosyn)           [facility]



               00                                 Take with           



                                                  food.           

 

     Zosyn      2018-0      No                       Notes:           [facility]



               5-04                               (Same as:           l



               07:00:                               Zosyn)           [facility]



               00                                 Dosing           



                                                  based on           



                                                  Piperacill           



                                                  in             



                                                  component           



                                                  ***            



                                                  MEDICATION           



                                                  WASTE ***           



                                                  Product           



                                                  Size: 3375           



                                                  mg Product           



                                                  Wasted:           



                                                  ___ mg           

 

     Vancomycin      2018-0      No                        2001 mg:           [facility]

[facility]



               5-04                               infuse           l



               07:00:                               over 2.5           [place]



               00                                 hours For           



                                                  adult           



                                                  patients           



                                                  only:           



                                                  Round to           



                                                  nearest           



                                                  250 mg per           



                                                  Medical           



                                                  Staff           



                                                  approval           



                                                  ***            



                                                  MEDICATION           



                                                  WASTE ***           



                                                  Product           



                                                  Size: 1000           



                                                  mg Product           



                                                  Wasted:           



                                                  ___ mg           

 

     Enoxaparin            No                       Notes:           [facility]

[facility]



               -                               (Same as:           l



               07:00:                               Lovenox)                                                           

 

     Naproxen            No                       Notes:           [facility]



               -                               (Same as:           l



               07:00:                               Naprosyn)                                            Take with           



                                                  food.           

 

     Zosyn            No                       Notes:           [facility]



               -                               (Same as:           l



               07:00:                               Zosyn)                                            Dosing           



                                                  based on           



                                                  Piperacill           



                                                  in             



                                                  component           



                                                  ***            



                                                  MEDICATION           



                                                  WASTE ***           



                                                  Product           



                                                  Size: 3375           



                                                  mg Product           



                                                  Wasted:           



                                                  ___ mg           

 

     Vancomycin            No                        2001 mg:           [facility]

[facility]



               -                               infuse           l



               07:00:                               over 2.5           [facility]



               00                                 hours For           



                                                  adult           



                                                  patients           



                                                  only:           



                                                  Round to           



                                                  nearest           



                                                  250 mg per           



                                                  Medical           



                                                  Staff           



                                                  approval           



                                                  ***            



                                                  MEDICATION           



                                                  WASTE ***           



                                                  Product           



                                                  Size: 1000           



                                                  mg Product           



                                                  Wasted:           



                                                  ___ mg           

 

     Enoxaparin            No                       Notes:           [facility]

[facility]



               -                               (Same as:           l



               07:00:                               Lovenox)                                                           

 

     Sodium            No                       1,000 mL,           [facility]

a



     Chloride                                     Rate: 125           l



     0.9% IV      06:46:                               ml/hr,           [facility]



     1,000 mL      00                                 Infuse           



                                                  over: 8           



                                                  hr, Route:           



                                                  IV, Dosing           



                                                  Weight           



                                                  127.27 kg,           



                                                  Total           



                                                  Volume:           



                                                  1,000,           



                                                  Start           



                                                  date:           



                                                  18           



                                                  1:46:00           



                                                  CDT,           



                                                  Duration:           



                                                  30 day,           



                                                  Stop date:           



                                                  18           



                                                  1:45:00           



                                                  CDT, 2.44,           



                                                  m2             

 

     Saline            No                       Notes:           [facility]



     Flush 0.9%                                     (Same as:           l



               06:46:                               BD             [place]                                 Posiflush)           

 

     Acetaminoph            No                       Notes: Do           [facility]

[facility]



     en        -                               not exceed           l



               06:46:                               4 gm/day.           [place]                                 (Same as:           



                                                  Tylenol)           

 

     Acetaminoph            No                       Notes:           [facility]

[facility]



     en 325 MG /                                     (Same as:           l



     Hydrocodone      06:46:                               [place]           [place]

nn



     Bitartrate      00                                 325/5) Do           



     5 MG Oral                                         not exceed           



     Tablet                                         4gm/day of           



                                                  acetaminop           



                                                  hen.           

 

     Sodium            No                       1,000 mL,           [facility]

a



     Chloride      5-04                               Rate: 125           l



     0.9% IV      06:46:                               ml/hr,           [place]



     1,000 mL      00                                 Infuse           



                                                  over: 8           



                                                  hr, Route:           



                                                  IV, Dosing           



                                                  Weight           



                                                  127.27 kg,           



                                                  Total           



                                                  Volume:           



                                                  1,000,           



                                                  Start           



                                                  date:           



                                                  18           



                                                  1:46:00           



                                                  CDT,           



                                                  Duration:           



                                                  30 day,           



                                                  Stop date:           



                                                  18           



                                                  1:45:00           



                                                  CDT, 2.44,           



                                                  m2             

 

     Saline            No                       Notes:           [facility]



     Flush 0.9%      5-04                               (Same as:           l



               06:46:                               BD             [facility]



               00                                 Posiflush)           

 

     Acetaminoph            No                       Notes: Do           [facility]

[facility]



     en        5-04                               not exceed           l



               06:46:                               4 gm/day.           [place]



               00                                 (Same as:           



                                                  Tylenol)           

 

     Acetaminoph            No                       Notes:           [facility]

[facility]



     en 325 MG /      5-04                               (Same as:           l



     Hydrocodone      06:46:                               [place]           [place]

nn



     Bitartrate      00                                 325/5) Do           



     5 MG Oral                                         not exceed           



     Tablet                                         4gm/day of           



                                                  acetaminop           



                                                  hen.           

 

     Sodium            No                       1,000 mL,           [facility]

a



     Chloride      5-04                               Rate: 125           l



     0.9% IV      06:46:                               ml/hr,           [place]



     1,000 mL      00                                 Infuse           



                                                  over: 8           



                                                  hr, Route:           



                                                  IV, Dosing           



                                                  Weight           



                                                  127.27 kg,           



                                                  Total           



                                                  Volume:           



                                                  1,000,           



                                                  Start           



                                                  date:           



                                                  18           



                                                  1:46:00           



                                                  CDT,           



                                                  Duration:           



                                                  30 day,           



                                                  Stop date:           



                                                  18           



                                                  1:45:00           



                                                  CDT, 2.44,           



                                                  m2             

 

     Saline            No                       Notes:           [facility]



     Flush 0.9%      5-04                               (Same as:           l



               06:46:                               BD             [place]



               00                                 Posiflush)           

 

     Acetaminoph            No                       Notes: Do           [facility]

[facility]



     en        5-04                               not exceed           l



               06:46:                               4 gm/day.           [place]



               00                                 (Same as:           



                                                  Tylenol)           

 

     Acetaminoph            No                       Notes:           [facility]

[facility]



     en 325 MG /      5-04                               (Same as:           l



     Hydrocodone      06:46:                               [place]           [place]

nn



     Bitartrate      00                                 325/5) Do           



     5 MG Oral                                         not exceed           



     Tablet                                         4gm/day of           



                                                  acetaminop           



                                                  hen.           

 

     Sodium            No                       1,000 mL,           [facility]

a



     Chloride      5-04                               Rate: 125           l



     0.9% IV      06:46:                               ml/hr,           [place]



     1,000 mL      00                                 Infuse           



                                                  over: 8           



                                                  hr, Route:           



                                                  IV, Dosing           



                                                  Weight           



                                                  127.27 kg,           



                                                  Total           



                                                  Volume:           



                                                  1,000,           



                                                  Start           



                                                  date:           



                                                  18           



                                                  1:46:00           



                                                  CDT,           



                                                  Duration:           



                                                  30 day,           



                                                  Stop date:           



                                                  18           



                                                  1:45:00           



                                                  CDT, 2.44,           



                                                  m2             

 

     Saline            No                       Notes:           [facility]



     Flush 0.9%      5-04                               (Same as:           l



               06:46:                               BD             [facility]



               00                                 Posiflush)           

 

     Acetaminoph            No                       Notes: Do           [facility]

[facility]



     en        5-04                               not exceed           l



               06:46:                               4 gm/day.           [place]



               00                                 (Same as:           



                                                  Tylenol)           

 

     Acetaminoph            No                       Notes:           [facility]

[facility]



     en 325 MG /      5-04                               (Same as:           l



     Hydrocodone      06:46:                               [place]co           [place]

nn



     Bitartrate      00                                 325/5) Do           



     5 MG Oral                                         not exceed           



     Tablet                                         4gm/day of           



                                                  acetaminop           



                                                  hen.           

 

     Sodium            No                       1,000 mL,           [facility]

a



     Chloride      5-04                               Rate: 125           l



     0.9% IV      06:46:                               ml/hr,           [place]



     1,000 mL      00                                 Infuse           



                                                  over: 8           



                                                  hr, Route:           



                                                  IV, Dosing           



                                                  Weight           



                                                  127.27 kg,           



                                                  Total           



                                                  Volume:           



                                                  1,000,           



                                                  Start           



                                                  date:           



                                                  18           



                                                  1:46:00           



                                                  CDT,           



                                                  Duration:           



                                                  30 day,           



                                                  Stop date:           



                                                  18           



                                                  1:45:00           



                                                  CDT, 2.44,           



                                                  m2             

 

     Saline            No                       Notes:           [facility]



     Flush 0.9%      5-04                               (Same as:           l



               06:46:                               BD             [place]



               00                                 Posiflush)           

 

     Acetaminoph            No                       Notes: Do           [facility]

[facility]



     en        5-04                               not exceed           l



               06:46:                               4 gm/day.           [facility]



               00                                 (Same as:           



                                                  Tylenol)           

 

     Acetaminoph            No                       Notes:           [facility]

[facility]



     en 325 MG /      5-04                               (Same as:           l



     Hydrocodone      06:46:                               Norco           [place]

nn



     Bitartrate      00                                 325/5) Do           



     5 MG Oral                                         not exceed           



     Tablet                                         4gm/day of           



                                                  acetaminop           



                                                  hen.           

 

     Sodium            No                       1,000 mL,           [facility]

a



     Chloride      5-04                               Rate: 125           l



     0.9% IV      06:46:                               ml/hr,           [facility]



     1,000 mL      00                                 Infuse           



                                                  over: 8           



                                                  hr, Route:           



                                                  IV, Dosing           



                                                  Weight           



                                                  127.27 kg,           



                                                  Total           



                                                  Volume:           



                                                  1,000,           



                                                  Start           



                                                  date:           



                                                  18           



                                                  1:46:00           



                                                  CDT,           



                                                  Duration:           



                                                  30 day,           



                                                  Stop date:           



                                                  18           



                                                  1:45:00           



                                                  CDT, 2.44,           



                                                  m2             

 

     Saline            No                       Notes:           [facility]



     Flush 0.9%      5-04                               (Same as:           l



               06:46:                               BD             [facility]



               00                                 Posiflush)           

 

     Acetaminoph            No                       Notes: Do           [facility]

[facility]



     en        5-04                               not exceed           l



               06:46:                               4 gm/day.           [facility]



               00                                 (Same as:           



                                                  Tylenol)           

 

     Acetaminoph            No                       Notes:           [facility]

[facility]



     en 325 MG /      5-04                               (Same as:           l



     Hydrocodone      06:46:                               Norco           [place]

nn



     Bitartrate      00                                 325/5) Do           



     5 MG Oral                                         not exceed           



     Tablet                                         4gm/day of           



                                                  acetaminop           



                                                  hen.           

 

     Sodium            No                       1,000 mL,           [facility]

a



     Chloride      5-04                               Rate: 125           l



     0.9% IV      06:46:                               ml/hr,           [facility]



     1,000 mL      00                                 Infuse           



                                                  over: 8           



                                                  hr, Route:           



                                                  IV, Dosing           



                                                  Weight           



                                                  127.27 kg,           



                                                  Total           



                                                  Volume:           



                                                  1,000,           



                                                  Start           



                                                  date:           



                                                  18           



                                                  1:46:00           



                                                  CDT,           



                                                  Duration:           



                                                  30 day,           



                                                  Stop date:           



                                                  18           



                                                  1:45:00           



                                                  CDT, 2.44,           



                                                  m2             

 

     Saline            No                       Notes:           [facility]



     Flush 0.9%      5-04                               (Same as:           l



               06:46:                               BD             [place]



               00                                 Posiflush)           

 

     Acetaminoph            No                       Notes: Do           [facility]

[facility]



     en        5-04                               not exceed           l



               06:46:                               4 gm/day.           [place]



               00                                 (Same as:           



                                                  Tylenol)           

 

     Acetaminoph            No                       Notes:           [facility]

[facility]



     en 325 MG /      5-04                               (Same as:           l



     Hydrocodone      06:46:                               Norco           [facility]

nn



     Bitartrate      00                                 325/5) Do           



     5 MG Oral                                         not exceed           



     Tablet                                         4gm/day of           



                                                  acetaminop           



                                                  hen.           

 

     Sodium            No                       1,000 mL,           [facility]

a



     Chloride      5-04                               Rate: 125           l



     0.9% IV      06:46:                               ml/hr,           [facility]



     1,000 mL      00                                 Infuse           



                                                  over: 8           



                                                  hr, Route:           



                                                  IV, Dosing           



                                                  Weight           



                                                  127.27 kg,           



                                                  Total           



                                                  Volume:           



                                                  1,000,           



                                                  Start           



                                                  date:           



                                                  18           



                                                  1:46:00           



                                                  CDT,           



                                                  Duration:           



                                                  30 day,           



                                                  Stop date:           



                                                  18           



                                                  1:45:00           



                                                  CDT, 2.44,           



                                                  m2             

 

     Saline            No                       Notes:           [facility]



     Flush 0.9%      5-04                               (Same as:           l



               06:46:                               BD             [place]



                                                Posiflush)           

 

     Acetaminoph            No                       Notes: Do           [facility]

[facility]



     en        5-04                               not exceed           l



               06:46:                               4 gm/day.           [place]



                                                (Same as:           



                                                  Tylenol)           

 

     Acetaminoph            No                       Notes:           [facility]

[facility]



     en 325 MG /      5-04                               (Same as:           l



     Hydrocodone      06:46:                               Norco           [facility]

nn



     Bitartrate      00                                 325/5) Do           



     5 MG Oral                                         not exceed           



     Tablet                                         4gm/day of           



                                                  acetaminop           



                                                  hen.           

 

     Reglan            No                       Notes:           [facility]



               5-04                               (Same as:           l



               04:27:                               Reglan)                                                           

 

     Benadryl            No                       Notes:           [facility]



               5-04                               (Same as:           l



               04:27:                               Benadryl)           [facility]                                                

 

     Reglan            No                       Notes:           [facility]



               5-04                               (Same as:           l



               04:27:                               Reglan)           [facility]                                                

 

     Benadryl            No                       Notes:           [facility]



               5-04                               (Same as:           l



               04:27:                               Benadryl)           [place]                                                

 

     Reglan            No                       Notes:           [facility]



               5-04                               (Same as:           l



               04:27:                               Reglan)           [facility]



                                                               

 

     Benadryl            No                       Notes:           [facility]



               5-04                               (Same as:           l



               04:27:                               Benadryl)           [facility]



                                                               

 

     Reglan            No                       Notes:           [facility]



               5-04                               (Same as:           l



               04:27:                               Reglan)           [place]



                                                               

 

     Benadryl            No                       Notes:           [facility]



               5-04                               (Same as:           l



               04:27:                               Benadryl)           [facility]                                                

 

     Reglan            No                       Notes:           [facility]



               5-04                               (Same as:           l



               04:27:                               Reglan)           [place]                                                

 

     Benadryl            No                       Notes:           [facility]



               5-04                               (Same as:           l



               04:27:                               Benadryl)           [facility]                                                

 

     Reglan            No                       Notes:           [facility]



               5-04                               (Same as:           l



               04:27:                               Reglan)           [facility]



                                                               

 

     Benadryl            No                       Notes:           [facility]



               5-04                               (Same as:           l



               04:27:                               Benadryl)           [place]



                                                               

 

     Reglan            No                       Notes:           [facility]



               5-04                               (Same as:           l



               04:27:                               Reglan)           [facility]



                                                               

 

     Benadryl            No                       Notes:           [facility]



               5-04                               (Same as:           l



               04:27:                               Benadryl)           [facility]



                                                               

 

     Reglan            No                       Notes:           [facility]



               5-04                               (Same as:           l



               04:27:                               Reglan)           [facility]



                                                               

 

     Benadryl            No                       Notes:           [facility]



               5-04                               (Same as:           l



               04:27:                               Benadryl)           [place]



                                                               

 

     Magnesium            No                       Notes:           [facility]

a



     Sulfate      5-04                               WASTE: F/P           l



               04:26:                               - Sink; E           [place]



                                                -              



                                                  Municipal           



                                                  Trash Bin           

 

     Sodium            No                       1,000 mL,           [facility]

a



     Chloride      5-04                               1000           l



     0.9%      04:26:                               ml/hr,           [place]



     (Bolus) IV      00                                 Infuse           



                                                  Over: 1           



                                                  hr, Route:           



                                                  IV, 1,000,           



                                                  Drug form:           



                                                  INJ, ONCE,           



                                                  Priority:           



                                                  STAT,           



                                                  Dosing           



                                                  Weight           



                                                  127.273           



                                                  kg, Start           



                                                  date:           



                                                  18           



                                                  23:26:00           



                                                  CDT, Stop           



                                                  date:           



                                                  18           



                                                  23:26:00           



                                                  CDT            

 

     Magnesium            No                       Notes:           [facility]

a



     Sulfate      5-04                               WASTE: F/P           l



               04:26:                               - Sink; E           [place]



                                                -              



                                                  Municipal           



                                                  Trash Bin           

 

     Sodium            No                       1,000 mL,           [facility]

a



     Chloride      5-04                               1000           l



     0.9%      04:26:                               ml/hr,           [facility]



     (Bolus) IV      00                                 Infuse           



                                                  Over: 1           



                                                  hr, Route:           



                                                  IV, 1,000,           



                                                  Drug form:           



                                                  INJ, ONCE,           



                                                  Priority:           



                                                  STAT,           



                                                  Dosing           



                                                  Weight           



                                                  127.273           



                                                  kg, Start           



                                                  date:           



                                                  18           



                                                  23:26:00           



                                                  CDT, Stop           



                                                  date:           



                                                  18           



                                                  23:26:00           



                                                  CDT            

 

     Magnesium            No                       Notes:           [facility]

a



     Sulfate      5-04                               WASTE: F/P           l



               04:26:                               - Sink; E           [facility]



                                                -              



                                                  Municipal           



                                                  Trash Bin           

 

     Sodium            No                       1,000 mL,           [facility]

a



     Chloride      5-04                               1000           l



     0.9%      04:26:                               ml/hr,           [place]



     (Bolus) IV      00                                 Infuse           



                                                  Over: 1           



                                                  hr, Route:           



                                                  IV, 1,000,           



                                                  Drug form:           



                                                  INJ, ONCE,           



                                                  Priority:           



                                                  STAT,           



                                                  Dosing           



                                                  Weight           



                                                  127.273           



                                                  kg, Start           



                                                  date:           



                                                  18           



                                                  23:26:00           



                                                  CDT, Stop           



                                                  date:           



                                                  18           



                                                  23:26:00           



                                                  CDT            

 

     Magnesium      2018-0      No                       Notes:           [facility]

a



     Sulfate      5-04                               WASTE: F/P           l



               04:26:                               - Sink; E           [facility]



                                                -              



                                                  Municipal           



                                                  Trash Bin           

 

     Sodium            No                       1,000 mL,           [facility]

a



     Chloride      5-04                               1000           l



     0.9%      04:26:                               ml/hr,           [place]



     (Bolus) IV      00                                 Infuse           



                                                  Over: 1           



                                                  hr, Route:           



                                                  IV, 1,000,           



                                                  Drug form:           



                                                  INJ, ONCE,           



                                                  Priority:           



                                                  STAT,           



                                                  Dosing           



                                                  Weight           



                                                  127.273           



                                                  kg, Start           



                                                  date:           



                                                  18           



                                                  23:26:00           



                                                  CDT, Stop           



                                                  date:           



                                                  18           



                                                  23:26:00           



                                                  CDT            

 

     Magnesium      2018-0      No                       Notes:           [facility]

a



     Sulfate      5-04                               WASTE: F/P           l



               04:26:                               - Sink; E           [facility]



                                                -              



                                                  Municipal           



                                                  Trash Bin           

 

     Sodium      0      No                       1,000 mL,           [facility]

a



     Chloride      5-04                               1000           l



     0.9%      04:26:                               ml/hr,           [place]



     (Bolus) IV      00                                 Infuse           



                                                  Over: 1           



                                                  hr, Route:           



                                                  IV, 1,000,           



                                                  Drug form:           



                                                  INJ, ONCE,           



                                                  Priority:           



                                                  STAT,           



                                                  Dosing           



                                                  Weight           



                                                  127.273           



                                                  kg, Start           



                                                  date:           



                                                  18           



                                                  23:26:00           



                                                  CDT, Stop           



                                                  date:           



                                                  18           



                                                  23:26:00           



                                                  CDT            

 

     Magnesium      2018-0      No                       Notes:           [facility]

a



     Sulfate      5-04                               WASTE: F/P           l



               04:26:                               - Sink; E           [place]



                                                -              



                                                  Municipal           



                                                  Trash Bin           

 

     Sodium      20180      No                       1,000 mL,           [facility]

a



     Chloride      5-04                               1000           l



     0.9%      04:26:                               ml/hr,           [place]



     (Bolus) IV      00                                 Infuse           



                                                  Over: 1           



                                                  hr, Route:           



                                                  IV, 1,000,           



                                                  Drug form:           



                                                  INJ, ONCE,           



                                                  Priority:           



                                                  STAT,           



                                                  Dosing           



                                                  Weight           



                                                  127.273           



                                                  kg, Start           



                                                  date:           



                                                  18           



                                                  23:26:00           



                                                  CDT, Stop           



                                                  date:           



                                                  18           



                                                  23:26:00           



                                                  CDT            

 

     Magnesium      2018-0      No                       Notes:           [facility]

a



     Sulfate      5-04                               WASTE: F/P           l



               04:26:                               - Sink; E           [place]



                                                -              



                                                  Municipal           



                                                  Trash Bin           

 

     Sodium      2018-0      No                       1,000 mL,           [facility]

a



     Chloride      5-04                               1000           l



     0.9%      04:26:                               ml/hr,           [place]



     (Bolus) IV      00                                 Infuse           



                                                  Over: 1           



                                                  hr, Route:           



                                                  IV, 1,000,           



                                                  Drug form:           



                                                  INJ, ONCE,           



                                                  Priority:           



                                                  STAT,           



                                                  Dosing           



                                                  Weight           



                                                  127.273           



                                                  kg, Start           



                                                  date:           



                                                  18           



                                                  23:26:00           



                                                  CDT, Stop           



                                                  date:           



                                                  18           



                                                  23:26:00           



                                                  CDT            

 

     Magnesium      2018-0      No                       Notes:           [facility]

a



     Sulfate      5-04                               WASTE: F/P           l



               04:26:                               - Sink; E           [place]



                                                -              



                                                  Municipal           



                                                  Trash Bin           

 

     Sodium      2018-0      No                       1,000 mL,           [facility]

a



     Chloride      5-04                               1000           l



     0.9%      04:26:                               ml/hr,           [facility]



     (Bolus) IV      00                                 Infuse           



                                                  Over: 1           



                                                  hr, Route:           



                                                  IV, 1,000,           



                                                  Drug form:           



                                                  INJ, ONCE,           



                                                  Priority:           



                                                  STAT,           



                                                  Dosing           



                                                  Weight           



                                                  127.273           



                                                  kg, Start           



                                                  date:           



                                                  18           



                                                  23:26:00           



                                                  CDT, Stop           



                                                  date:           



                                                  18           



                                                  23:26:00           



                                                  CDT            

 

     Zosyn      2018-0      No                       4.5 gm,           [facility]



                                              Route:           l



               04:10:                               IVPB,           [facility]



               00                                 ONCE,           



                                                  Dosing           



                                                  Weight           



                                                  127.273,           



                                                  kg,            



                                                  Priority:           



                                                  STAT,           



                                                  Start           



                                                  date:           



                                                  18           



                                                  23:10:00           



                                                  CDT, Stop           



                                                  date:           



                                                  18           



                                                  23:10:00           



                                                  CDT, ABX           



                                                  Indication           



                                                  :              



                                                  Bacteremia           

 

     Vancomycin      -      No                         mg:           [facility]

[facility]



               -                               infuse           l



               04:10:                               over 2.5           [facility]



               00                                 hours For           



                                                  adult           



                                                  patients           



                                                  only:           



                                                  Round to           



                                                  nearest           



                                                  250 mg per           



                                                  Medical           



                                                  Staff           



                                                  approval           



                                                  ***            



                                                  MEDICATION           



                                                  WASTE ***           



                                                  Product           



                                                  Size: 1000           



                                                  mg Product           



                                                  Wasted:           



                                                  ___ mg           

 

     Zosyn      2018-0      No                       4.5 gm,           [facility]



               5-04                               Route:           l



               04:10:                               IVPB,           [place]



               00                                 ONCE,           



                                                  Dosing           



                                                  Weight           



                                                  127.273,           



                                                  kg,            



                                                  Priority:           



                                                  STAT,           



                                                  Start           



                                                  date:           



                                                  18           



                                                  23:10:00           



                                                  CDT, Stop           



                                                  date:           



                                                  18           



                                                  23:10:00           



                                                  CDT, ABX           



                                                  Indication           



                                                  :              



                                                  Bacteremia           

 

     Vancomycin      2018-0      No                         mg:           [facility]

[facility]



               5-04                               infuse           l



               04:10:                               over 2.5           [facility]



               00                                 hours For           



                                                  adult           



                                                  patients           



                                                  only:           



                                                  Round to           



                                                  nearest           



                                                  250 mg per           



                                                  Medical           



                                                  Staff           



                                                  approval           



                                                  ***            



                                                  MEDICATION           



                                                  WASTE ***           



                                                  Product           



                                                  Size: 1000           



                                                  mg Product           



                                                  Wasted:           



                                                  ___ mg           

 

     Zosyn      2018-0      No                       4.5 gm,           [facility]



               5-04                               Route:           l



               04:10:                               IVPB,           [place]



               00                                 ONCE,           



                                                  Dosing           



                                                  Weight           



                                                  127.273,           



                                                  kg,            



                                                  Priority:           



                                                  STAT,           



                                                  Start           



                                                  date:           



                                                  18           



                                                  23:10:00           



                                                  CDT, Stop           



                                                  date:           



                                                  18           



                                                  23:10:00           



                                                  CDT, ABX           



                                                  Indication           



                                                  :              



                                                  Bacteremia           

 

     Vancomycin      2018-0      No                         mg:           [facility]

[facility]



               5-04                               infuse           l



               04:10:                               over 2.5           [facility]



               00                                 hours For           



                                                  adult           



                                                  patients           



                                                  only:           



                                                  Round to           



                                                  nearest           



                                                  250 mg per           



                                                  Medical           



                                                  Staff           



                                                  approval           



                                                  ***            



                                                  MEDICATION           



                                                  WASTE ***           



                                                  Product           



                                                  Size: 1000           



                                                  mg Product           



                                                  Wasted:           



                                                  ___ mg           

 

     Zosyn      2018-0      No                       4.5 gm,           [facility]



               5-04                               Route:           l



               04:10:                               IVPB,           [facility]



               00                                 ONCE,           



                                                  Dosing           



                                                  Weight           



                                                  127.273,           



                                                  kg,            



                                                  Priority:           



                                                  STAT,           



                                                  Start           



                                                  date:           



                                                  18           



                                                  23:10:00           



                                                  CDT, Stop           



                                                  date:           



                                                  18           



                                                  23:10:00           



                                                  CDT, ABX           



                                                  Indication           



                                                  :              



                                                  Bacteremia           

 

     Vancomycin      2018-0      No                         mg:           [facility]

[facility]



               5-04                               infuse           l



               04:10:                               over 2.5           [facility]



               00                                 hours For           



                                                  adult           



                                                  patients           



                                                  only:           



                                                  Round to           



                                                  nearest           



                                                  250 mg per           



                                                  Medical           



                                                  Staff           



                                                  approval           



                                                  ***            



                                                  MEDICATION           



                                                  WASTE ***           



                                                  Product           



                                                  Size: 1000           



                                                  mg Product           



                                                  Wasted:           



                                                  ___ mg           

 

     Zosyn      2018-0      No                       4.5 gm,           [facility]



               5-04                               Route:           l



               04:10:                               IVPB,           [facility]



               00                                 ONCE,           



                                                  Dosing           



                                                  Weight           



                                                  127.273,           



                                                  kg,            



                                                  Priority:           



                                                  STAT,           



                                                  Start           



                                                  date:           



                                                  18           



                                                  23:10:00           



                                                  CDT, Stop           



                                                  date:           



                                                  18           



                                                  23:10:00           



                                                  CDT, ABX           



                                                  Indication           



                                                  :              



                                                  Bacteremia           

 

     Vancomycin      2018-0      No                         mg:           [facility]

[facility]



               5-04                               infuse           l



               04:10:                               over 2.5           [facility]



               00                                 hours For           



                                                  adult           



                                                  patients           



                                                  only:           



                                                  Round to           



                                                  nearest           



                                                  250 mg per           



                                                  Medical           



                                                  Staff           



                                                  approval           



                                                  ***            



                                                  MEDICATION           



                                                  WASTE ***           



                                                  Product           



                                                  Size: 1000           



                                                  mg Product           



                                                  Wasted:           



                                                  ___ mg           

 

     Zosyn      2018-0      No                       4.5 gm,           [facility]



                                              Route:           l



               04:10:                               IVPB,           [facility]



               00                                 ONCE,           



                                                  Dosing           



                                                  Weight           



                                                  127.273,           



                                                  kg,            



                                                  Priority:           



                                                  STAT,           



                                                  Start           



                                                  date:           



                                                  18           



                                                  23:10:00           



                                                  CDT, Stop           



                                                  date:           



                                                  18           



                                                  23:10:00           



                                                  CDT, ABX           



                                                  Indication           



                                                  :              



                                                  Bacteremia           

 

     Vancomycin      2018-0      No                         mg:           Me

[facility]



               5-                               infuse           l



               04:10:                               over 2.5           [facility]



               00                                 hours For           



                                                  adult           



                                                  patients           



                                                  only:           



                                                  Round to           



                                                  nearest           



                                                  250 mg per           



                                                  Medical           



                                                  Staff           



                                                  approval           



                                                  ***            



                                                  MEDICATION           



                                                  WASTE ***           



                                                  Product           



                                                  Size: 1000           



                                                  mg Product           



                                                  Wasted:           



                                                  ___ mg           

 

     Zosyn      2018-0      No                       4.5 gm,           [facility]



                                              Route:           l



               04:10:                               IVPB,           [facility]



               00                                 ONCE,           



                                                  Dosing           



                                                  Weight           



                                                  127.273,           



                                                  kg,            



                                                  Priority:           



                                                  STAT,           



                                                  Start           



                                                  date:           



                                                  18           



                                                  23:10:00           



                                                  CDT, Stop           



                                                  date:           



                                                  18           



                                                  23:10:00           



                                                  CDT, ABX           



                                                  Indication           



                                                  :              



                                                  Bacteremia           

 

     Vancomycin      -0      No                         mg:           Me

[facility]



               -                               infuse           l



               04:10:                               over 2.5           [facility]



               00                                 hours For           



                                                  adult           



                                                  patients           



                                                  only:           



                                                  Round to           



                                                  nearest           



                                                  250 mg per           



                                                  Medical           



                                                  Staff           



                                                  approval           



                                                  ***            



                                                  MEDICATION           



                                                  WASTE ***           



                                                  Product           



                                                  Size: 1000           



                                                  mg Product           



                                                  Wasted:           



                                                  ___ mg           

 

     Zosyn      2018-0      No                       4.5 gm,           [facility]



                                              Route:           l



               04:10:                               IVPB,           [facility]



               00                                 ONCE,           



                                                  Dosing           



                                                  Weight           



                                                  127.273,           



                                                  kg,            



                                                  Priority:           



                                                  STAT,           



                                                  Start           



                                                  date:           



                                                  18           



                                                  23:10:00           



                                                  CDT, Stop           



                                                  date:           



                                                  18           



                                                  23:10:00           



                                                  CDT, ABX           



                                                  Indication           



                                                  :              



                                                  Bacteremia           

 

     Vancomycin      2018-0      No                         mg:           [facility]

[facility]



               -04                               infuse           l



               04:10:                               over 2.5           [facility]



               00                                 hours For           



                                                  adult           



                                                  patients           



                                                  only:           



                                                  Round to           



                                                  nearest           



                                                  250 mg per           



                                                  Medical           



                                                  Staff           



                                                  approval           



                                                  ***            



                                                  MEDICATION           



                                                  WASTE ***           



                                                  Product           



                                                  Size: 1000           



                                                  mg Product           



                                                  Wasted:           



                                                  ___ mg           

 

     normal      2018-0      No                       1,000 mL,           [facility]

a



     saline 0.9%                                     Rate: 75           l



     IV 1,000 mL      03:39:                               ml/hr,           [facility]

[facility]



               00                                 Infuse           



                                                  over: 13.3           



                                                  hr, Route:           



                                                  IV, Dosing           



                                                  Weight           



                                                  127.273           



                                                  kg, Total           



                                                  Volume:           



                                                  1,000,           



                                                  Start           



                                                  date:           



                                                  18           



                                                  22:39:00           



                                                  CDT,           



                                                  Duration:           



                                                  30 day,           



                                                  Stop date:           



                                                  18           



                                                  22:38:00           



                                                  CDT, 2.36,           



                                                  m2             

 

     normal      2018-0      No                       1,000 mL,           [facility]

[facility]



     saline 0.9%      5-04                               Rate: 75           l



     IV 1,000 mL      03:39:                               ml/hr,           [facility]

[facility]



               00                                 Infuse           



                                                  over: 13.3           



                                                  hr, Route:           



                                                  IV, Dosing           



                                                  Weight           



                                                  127.273           



                                                  kg, Total           



                                                  Volume:           



                                                  1,000,           



                                                  Start           



                                                  date:           



                                                  18           



                                                  22:39:00           



                                                  CDT,           



                                                  Duration:           



                                                  30 day,           



                                                  Stop date:           



                                                  18           



                                                  22:38:00           



                                                  CDT, 2.36,           



                                                  m2             

 

     normal      2018-0      No                       1,000 mL,           [facility]

[facility]



     saline 0.9%      5-04                               Rate: 75           l



     IV 1,000 mL      03:39:                               ml/hr,           [facility]

[facility]



               00                                 Infuse           



                                                  over: 13.3           



                                                  hr, Route:           



                                                  IV, Dosing           



                                                  Weight           



                                                  127.273           



                                                  kg, Total           



                                                  Volume:           



                                                  1,000,           



                                                  Start           



                                                  date:           



                                                  18           



                                                  22:39:00           



                                                  CDT,           



                                                  Duration:           



                                                  30 day,           



                                                  Stop date:           



                                                  18           



                                                  22:38:00           



                                                  CDT, 2.36,           



                                                  m2             

 

     normal      2018-0      No                       1,000 mL,           [facility]

[facility]



     saline 0.9%      5-04                               Rate: 75           l



     IV 1,000 mL      03:39:                               ml/hr,           [facility]

[facility]



               00                                 Infuse           



                                                  over: 13.3           



                                                  hr, Route:           



                                                  IV, Dosing           



                                                  Weight           



                                                  127.273           



                                                  kg, Total           



                                                  Volume:           



                                                  1,000,           



                                                  Start           



                                                  date:           



                                                  18           



                                                  22:39:00           



                                                  CDT,           



                                                  Duration:           



                                                  30 day,           



                                                  Stop date:           



                                                  18           



                                                  22:38:00           



                                                  CDT, 2.36,           



                                                  m2             

 

     normal      2018-0      No                       1,000 mL,           [facility]

[facility]



     saline 0.9%      5-04                               Rate: 75           l



     IV 1,000 mL      03:39:                               ml/hr,           [facility]

[facility]



               00                                 Infuse           



                                                  over: 13.3           



                                                  hr, Route:           



                                                  IV, Dosing           



                                                  Weight           



                                                  127.273           



                                                  kg, Total           



                                                  Volume:           



                                                  1,000,           



                                                  Start           



                                                  date:           



                                                  18           



                                                  22:39:00           



                                                  CDT,           



                                                  Duration:           



                                                  30 day,           



                                                  Stop date:           



                                                  18           



                                                  22:38:00           



                                                  CDT, 2.36,           



                                                  m2             

 

     normal      2018-0      No                       1,000 mL,           [facility]

[facility]



     saline 0.9%      5-04                               Rate: 75           l



     IV 1,000 mL      03:39:                               ml/hr,           [facility]

[facility]



               00                                 Infuse           



                                                  over: 13.3           



                                                  hr, Route:           



                                                  IV, Dosing           



                                                  Weight           



                                                  127.273           



                                                  kg, Total           



                                                  Volume:           



                                                  1,000,           



                                                  Start           



                                                  date:           



                                                  18           



                                                  22:39:00           



                                                  CDT,           



                                                  Duration:           



                                                  30 day,           



                                                  Stop date:           



                                                  18           



                                                  22:38:00           



                                                  CDT, 2.36,           



                                                  m2             

 

     normal      2018-0      No                       1,000 mL,           [facility]

[facility]



     saline 0.9%      5-04                               Rate: 75           l



     IV 1,000 mL      03:39:                               ml/hr,           [facility]

[facility]



               00                                 Infuse           



                                                  over: 13.3           



                                                  hr, Route:           



                                                  IV, Dosing           



                                                  Weight           



                                                  127.273           



                                                  kg, Total           



                                                  Volume:           



                                                  1,000,           



                                                  Start           



                                                  date:           



                                                  18           



                                                  22:39:00           



                                                  CDT,           



                                                  Duration:           



                                                  30 day,           



                                                  Stop date:           



                                                  18           



                                                  22:38:00           



                                                  CDT, 2.36,           



                                                  m2             

 

     normal      -0      No                       1,000 mL,           [facility]

[facility]



     saline 0.9%      5-04                               Rate: 75           l



     IV 1,000 mL      03:39:                               ml/hr,           [facility]

[facility]



               00                                 Infuse           



                                                  over: 13.3           



                                                  hr, Route:           



                                                  IV, Dosing           



                                                  Weight           



                                                  127.273           



                                                  kg, Total           



                                                  Volume:           



                                                  1,000,           



                                                  Start           



                                                  date:           



                                                  18           



                                                  22:39:00           



                                                  CDT,           



                                                  Duration:           



                                                  30 day,           



                                                  Stop date:           



                                                  18           



                                                  22:38:00           



                                                  CDT, 2.36,           



                                                  m2             

 

     Acetaminoph            No                       Notes: Max           

[facility]



     en        5-04                               acetaminop           l



               02:56:                               hen 4000           [facility]



               00                                 mg/day (4           



                                                  gm/day).           



                                                  (Same as:           



                                                  Tylenol           



                                                  Extra           



                                                  Strength)           

 

     Acetaminoph            No                       Notes: Max           

[facility]



     en        5-04                               acetaminop           l



               02:56:                               hen 4000           [facility]



               00                                 mg/day (4           



                                                  gm/day).           



                                                  (Same as:           



                                                  Tylenol           



                                                  Extra           



                                                  Strength)           

 

     Acetaminoph            No                       Notes: Max           

[facility]



     en        5-04                               acetaminop           l



               02:56:                               hen 4000           [place]



               00                                 mg/day (4           



                                                  gm/day).           



                                                  (Same as:           



                                                  Tylenol           



                                                  Extra           



                                                  Strength)           

 

     Acetaminoph            No                       Notes: Max           

[facility]



     en        5-04                               acetaminop           l



               02:56:                               hen 4000           [place]



               00                                 mg/day (4           



                                                  gm/day).           



                                                  (Same as:           



                                                  Tylenol           



                                                  Extra           



                                                  Strength)           

 

     Acetaminoph            No                       Notes: Max           

[facility]



     en        5-04                               acetaminop           l



               02:56:                               hen 4000           [place]



               00                                 mg/day (4           



                                                  gm/day).           



                                                  (Same as:           



                                                  Tylenol           



                                                  Extra           



                                                  Strength)           

 

     Acetaminoph            No                       Notes: Max           

[facility]



     en        5-04                               acetaminop           l



               02:56:                               hen 4000           [place]



               00                                 mg/day (4           



                                                  gm/day).           



                                                  (Same as:           



                                                  Tylenol           



                                                  Extra           



                                                  Strength)           

 

     Acetaminoph            No                       Notes: Max           

[facility]



     en        5-04                               acetaminop           l



               02:56:                               hen 4000           [place]



               00                                 mg/day (4           



                                                  gm/day).           



                                                  (Same as:           



                                                  Tylenol           



                                                  Extra           



                                                  Strength)           

 

     Acetaminoph            No                       Notes: Max           

[facility]



     en        5-04                               acetaminop           l



               02:56:                               hen 4000           [place]



               00                                 mg/day (4           



                                                  gm/day).           



                                                  (Same as:           



                                                  Tylenol           



                                                  Extra           



                                                  Strength)           

 

     Rocephin            No                       Notes:           [facility]



                                              (Same As:           l



               02:21:                               Rocephin).           [place]



               00                                 ***            



                                                  MEDICATION           



                                                  WASTE ***           



                                                  Product           



                                                  Size: 1000           



                                                  mg Product           



                                                  Wasted:           



                                                  _0__ mg           

 

     Rocephin            No                       Notes:           [facility]



                                              (Same As:           l



               02:21:                               Rocephin).           [place]



               00                                 ***            



                                                  MEDICATION           



                                                  WASTE ***           



                                                  Product           



                                                  Size: 1000           



                                                  mg Product           



                                                  Wasted:           



                                                  _0__ mg           

 

     Rocephin            No                       Notes:           [facility]



                                              (Same As:           l



               02:21:                               Rocephin).           [place]



               00                                 ***            



                                                  MEDICATION           



                                                  WASTE ***           



                                                  Product           



                                                  Size: 1000           



                                                  mg Product           



                                                  Wasted:           



                                                  _0__ mg           

 

     Rocephin      0      No                       Notes:           [facility]



               -                               (Same As:           l



               02:21:                               Rocephin).           [place]



               00                                 ***            



                                                  MEDICATION           



                                                  WASTE ***           



                                                  Product           



                                                  Size: 1000           



                                                  mg Product           



                                                  Wasted:           



                                                  _0__ mg           

 

     Rocephin      0      No                       Notes:           [facility]



               -                               (Same As:           l



               02:21:                               Rocephin).           [place]



               00                                 ***            



                                                  MEDICATION           



                                                  WASTE ***           



                                                  Product           



                                                  Size: 1000           



                                                  mg Product           



                                                  Wasted:           



                                                  _0__ mg           

 

     Rocephin      0      No                       Notes:           [facility]



               5-04                               (Same As:           l



               02:21:                               Rocephin).           [place]



               00                                 ***            



                                                  MEDICATION           



                                                  WASTE ***           



                                                  Product           



                                                  Size: 1000           



                                                  mg Product           



                                                  Wasted:           



                                                  _0__ mg           

 

     Rocephin      2018-0      No                       Notes:           [facility]



               5-04                               (Same As:           l



               02:21:                               Rocephin).           [place]



               00                                 ***            



                                                  MEDICATION           



                                                  WASTE ***           



                                                  Product           



                                                  Size: 1000           



                                                  mg Product           



                                                  Wasted:           



                                                  _0__ mg           

 

     Rocephin      2018-0      No                       Notes:           [facility]



               5-04                               (Same As:           l



               02:21:                               Rocephin).           [place]



               00                                 ***            



                                                  MEDICATION           



                                                  WASTE ***           



                                                  Product           



                                                  Size: 1000           



                                                  mg Product           



                                                  Wasted:           



                                                  _0__ mg           

 

     Morphine      2018-0      No                       4 mg,           [facility]



               5-04                               Route:           l



               00:05:                               IVP, ONCE,           [place]



               00                                 Dosing           



                                                  Weight           



                                                  127.273,           



                                                  kg,            



                                                  Priority:           



                                                  STAT,           



                                                  Start           



                                                  date:           



                                                  18           



                                                  19:05:00           



                                                  CDT, Stop           



                                                  date:           



                                                  18           



                                                  19:05:00           



                                                  CDT            

 

     Morphine      2018-0      No                       4 mg,           [facility]



               5-04                               Route:           l



               00:05:                               IVP, ONCE,           [place]



               00                                 Dosing           



                                                  Weight           



                                                  127.273,           



                                                  kg,            



                                                  Priority:           



                                                  STAT,           



                                                  Start           



                                                  date:           



                                                  18           



                                                  19:05:00           



                                                  CDT, Stop           



                                                  date:           



                                                  18           



                                                  19:05:00           



                                                  CDT            

 

     Morphine      2018-0      No                       4 mg,           [facility]



               5-04                               Route:           l



               00:05:                               IVP, ONCE,           [place]



               00                                 Dosing           



                                                  Weight           



                                                  127.273,           



                                                  kg,            



                                                  Priority:           



                                                  STAT,           



                                                  Start           



                                                  date:           



                                                  18           



                                                  19:05:00           



                                                  CDT, Stop           



                                                  date:           



                                                  18           



                                                  19:05:00           



                                                  CDT            

 

     Morphine      2018-0      No                       4 mg,           [facility]



               5-04                               Route:           l



               00:05:                               IVP, ONCE,           [place]



               00                                 Dosing           



                                                  Weight           



                                                  127.273,           



                                                  kg,            



                                                  Priority:           



                                                  STAT,           



                                                  Start           



                                                  date:           



                                                  18           



                                                  19:05:00           



                                                  CDT, Stop           



                                                  date:           



                                                  18           



                                                  19:05:00           



                                                  CDT            

 

     Morphine      2018-0      No                       4 mg,           [facility]



               5-04                               Route:           l



               00:05:                               IVP, ONCE,           [place]



               00                                 Dosing           



                                                  Weight           



                                                  127.273,           



                                                  kg,            



                                                  Priority:           



                                                  STAT,           



                                                  Start           



                                                  date:           



                                                  18           



                                                  19:05:00           



                                                  CDT, Stop           



                                                  date:           



                                                  18           



                                                  19:05:00           



                                                  CDT            

 

     Morphine      2018-0      No                       4 mg,           [facility]



               5-04                               Route:           l



               00:05:                               IVP, ONCE,                                            Dosing           



                                                  Weight           



                                                  127.273,           



                                                  kg,            



                                                  Priority:           



                                                  STAT,           



                                                  Start           



                                                  date:           



                                                  18           



                                                  19:05:00           



                                                  CDT, Stop           



                                                  date:           



                                                  18           



                                                  19:05:00           



                                                  CDT            

 

     Morphine      2018-0      No                       4 mg,           [facility]



               5-04                               Route:           l



               00:05:                               IVP, ONCE,                                            Dosing           



                                                  Weight           



                                                  127.273,           



                                                  kg,            



                                                  Priority:           



                                                  STAT,           



                                                  Start           



                                                  date:           



                                                  18           



                                                  19:05:00           



                                                  CDT, Stop           



                                                  date:           



                                                  18           



                                                  19:05:00           



                                                  CDT            

 

     Morphine      2018-0      No                       4 mg,           [facility]



               5-04                               Route:           l



               00:05:                               IVP, ONCE,                                            Dosing           



                                                  Weight           



                                                  127.273,           



                                                  kg,            



                                                  Priority:           



                                                  STAT,           



                                                  Start           



                                                  date:           



                                                  18           



                                                  19:05:00           



                                                  CDT, Stop           



                                                  date:           



                                                  18           



                                                  19:05:00           



                                                  CDT            

 

     Benadryl            No                       Notes:           [facility]



               5-03                               (Same as:           l



               23:14:                               Benadryl)                                                           

 

     Benadryl            No                       Notes:           [facility]



               5-03                               (Same as:           l



               23:14:                               Benadryl)                                                           

 

     Benadryl            No                       Notes:           [facility]



               5-03                               (Same as:           l



               23:14:                               Benadryl)                                                           

 

     Benadryl            No                       Notes:           [facility]



               5-03                               (Same as:           l



               23:14:                               Benadryl)                                                           

 

     Benadryl            No                       Notes:           [facility]



               5-03                               (Same as:           l



               23:14:                               Benadryl)           [place]



                                                               

 

     Benadryl            No                       Notes:           [facility]



               5-03                               (Same as:           l



               23:14:                               Benadryl)           [place]



                                                               

 

     Benadryl            No                       Notes:           [facility]



               5-03                               (Same as:           l



               23:14:                               Benadryl)           [place]



                                                               

 

     Benadryl            No                       Notes:           [facility]



               5-03                               (Same as:           l



               23:14:                               Benadryl)           [place]



                                                               

 

     Benadryl            No                       Notes:           [facility]



               5-03                               (Same as:           l



               22:56:                               Benadryl)                                                           

 

     Morphine      2018-0      No                       4 mg, 1           [facility]

a



               5-03                               mL, Route:           l



               22:56:                               IVP, Drug                                            form:           



                                                  SOLN,           



                                                  ONCE,           



                                                  Dosing           



                                                  Weight           



                                                  127.273,           



                                                  kg,            



                                                  Priority:           



                                                  STAT,           



                                                  Start           



                                                  date:           



                                                  18           



                                                  17:56:00           



                                                  CDT, Stop           



                                                  date:           



                                                  18           



                                                  17:56:00           



                                                  CDT            

 

     Benadryl      2018-0      No                       Notes:           [facility]



               5-03                               (Same as:           l



               22:56:                               Benadryl)                                                           

 

     Morphine      2018-0      No                       4 mg, 1           [facility]

a



               5-03                               mL, Route:           l



               22:56:                               IVP, Drug                                            form:           



                                                  SOLN,           



                                                  ONCE,           



                                                  Dosing           



                                                  Weight           



                                                  127.273,           



                                                  kg,            



                                                  Priority:           



                                                  STAT,           



                                                  Start           



                                                  date:           



                                                  18           



                                                  17:56:00           



                                                  CDT, Stop           



                                                  date:           



                                                  18           



                                                  17:56:00           



                                                  CDT            

 

     Benadryl      -0      No                       Notes:           [facility]



               5-03                               (Same as:           l



               22:56:                               Benadryl)                                                           

 

     Morphine      -0      No                       4 mg, 1           [facility]

a



               5-03                               mL, Route:           l



               22:56:                               IVP, Drug                                            form:           



                                                  SOLN,           



                                                  ONCE,           



                                                  Dosing           



                                                  Weight           



                                                  127.273,           



                                                  kg,            



                                                  Priority:           



                                                  STAT,           



                                                  Start           



                                                  date:           



                                                  18           



                                                  17:56:00           



                                                  CDT, Stop           



                                                  date:           



                                                  18           



                                                  17:56:00           



                                                  CDT            

 

     Benadryl      -0      No                       Notes:           [facility]



               5-03                               (Same as:           l



               22:56:                               Benadryl)                                                           

 

     Morphine      2018-0      No                       4 mg, 1           [facility]

a



               5-03                               mL, Route:           l



               22:56:                               IVP, Drug                                            form:           



                                                  SOLN,           



                                                  ONCE,           



                                                  Dosing           



                                                  Weight           



                                                  127.273,           



                                                  kg,            



                                                  Priority:           



                                                  STAT,           



                                                  Start           



                                                  date:           



                                                  18           



                                                  17:56:00           



                                                  CDT, Stop           



                                                  date:           



                                                  18           



                                                  17:56:00           



                                                  CDT            

 

     Benadryl      2018-0      No                       Notes:           [facility]



               5-03                               (Same as:           l



               22:56:                               Benadryl)                                                           

 

     Morphine      2018-0      No                       4 mg, 1           [facility]

a



               5-03                               mL, Route:           l



               22:56:                               IVP, Drug           [place]                                 form:           



                                                  SOLN,           



                                                  ONCE,           



                                                  Dosing           



                                                  Weight           



                                                  127.273,           



                                                  kg,            



                                                  Priority:           



                                                  STAT,           



                                                  Start           



                                                  date:           



                                                  18           



                                                  17:56:00           



                                                  CDT, Stop           



                                                  date:           



                                                  18           



                                                  17:56:00           



                                                  CDT            

 

     Benadryl      2018-0      No                       Notes:           [facility]



               5-03                               (Same as:           l



               22:56:                               Benadryl)                                                           

 

     Morphine      2018-0      No                       4 mg, 1           [facility]

a



               5-03                               mL, Route:           l



               22:56:                               IVP, Drug                                            form:           



                                                  SOLN,           



                                                  ONCE,           



                                                  Dosing           



                                                  Weight           



                                                  127.273,           



                                                  kg,            



                                                  Priority:           



                                                  STAT,           



                                                  Start           



                                                  date:           



                                                  18           



                                                  17:56:00           



                                                  CDT, Stop           



                                                  date:           



                                                  18           



                                                  17:56:00           



                                                  CDT            

 

     Benadryl      2018-0      No                       Notes:           [facility]



               5-03                               (Same as:           l



               22:56:                               Benadryl)                                                           

 

     Morphine      2018-0      No                       4 mg, 1           [facility]

a



               5-03                               mL, Route:           l



               22:56:                               IVP, Drug                                            form:           



                                                  SOLN,           



                                                  ONCE,           



                                                  Dosing           



                                                  Weight           



                                                  127.273,           



                                                  kg,            



                                                  Priority:           



                                                  STAT,           



                                                  Start           



                                                  date:           



                                                  18           



                                                  17:56:00           



                                                  CDT, Stop           



                                                  date:           



                                                  18           



                                                  17:56:00           



                                                  CDT            

 

     Benadryl      2018-0      No                       Notes:           [facility]



               5-03                               (Same as:           l



               22:56:                               Benadryl)                                                           

 

     Morphine      2018-0      No                       4 mg, 1           [facility]

a



               5-03                               mL, Route:           l



               22:56:                               IVP, Drug                                            form:           



                                                  SOLN,           



                                                  ONCE,           



                                                  Dosing           



                                                  Weight           



                                                  127.273,           



                                                  kg,            



                                                  Priority:           



                                                  STAT,           



                                                  Start           



                                                  date:           



                                                  18           



                                                  17:56:00           



                                                  CDT, Stop           



                                                  date:           



                                                  18           



                                                  17:56:00           



                                                  CDT            

 

     OXYCODONE      -      Yes                      Take by           [facility]

[facility]



     HCL/ACETAMI      2-20                               mouth.           [facility] of



     NOPHEN      10:03:                                              [place]



     (PERCOCET      17                                                Medical



     [facility])                                                        Branch

 

     OXYCODONE      -      Yes                      Take by           [facility]

rs



     HCL/ACETAMI      2-20                               mouth.           ity of



     NOPHEN      04:03:                                              [place]



     (PERCOCET      17                                                Medical



     [facility])                                                        [place]

 

     OXYCODONE      2017      Yes                      Take by           [facility]

rs



     HCL/ACETAMI      2-20                               mouth.           ity of



     NOPHEN      04:03:                                              [place]



     (PERCOCET      17                                                Medical



     [facility])                                                        [place]

 

     OXYCODONE      2017      Yes                      Take by           [facility]

rs



     HCL/ACETAMI      2-20                               mouth.           ity of



     NOPHEN      04:03:                                              [place]



     (PERCOCET      17                                                Medical



     [facility])                                                        Branch

 

     OXYCODONE      -      Yes                      Take by           [facility]

rs



     HCL/ACETAMI      2-20                               mouth.           ity of



     NOPHEN      04:03:                                              [place]



     (PERCOCET      17                                                Medical



     [facility])                                                        Branch

 

     dicyclomine      -      Yes            20mg      Take 1           [facility]

ers



     (BENTYL) 20      2-20                               tablet by           ity

 of



     mg tablet      00:00:                               mouth 4           [place]



               00                                 (four)           Medical



                                                  times           Branch



                                                  daily.           

 

     proMETHazin      2017      Yes            25mg      Take 1           [facility]

ers



     e 25 mg      2-20                               tablet by           [facility] of



     tablet      00:00:                               mouth           [place]



               00                                 every 6           Medical



                                                  (six)           Branch



                                                  hours as           



                                                  needed for           



                                                  Nausea and           



                                                  Vomiting           



                                                  (N/V).           

 

     proMETHazin      2017      Yes            25mg      Take 1           [facility]

ers



     e 25 mg      2-20                               tablet by           ity of



     tablet      00:00:                               mouth           [place]



               00                                 every 6           Medical



                                                  (six)           Branch



                                                  hours as           



                                                  needed for           



                                                  Nausea and           



                                                  Vomiting           



                                                  (N/V).           

 

     proMETHazin      2017      Yes            25mg      Take 1           [facility]

ers



     e 25 mg      2-20                               tablet by           ity of



     tablet      00:00:                               mouth           [place]



               00                                 every 6           Medical



                                                  (six)           Branch



                                                  hours as           



                                                  needed for           



                                                  Nausea and           



                                                  Vomiting           



                                                  (N/V).           

 

     dicyclomine      -      Yes            20mg      Take 1           [facility]

ers



     (BENTYL) 20      2-20                               tablet by           ity

 of



     mg tablet      00:00:                               mouth 4           [place]



               00                                 (four)           Medical



                                                  times           Branch



                                                  daily.           

 

     proMETHazin      2017      Yes            25mg      Take 1           [facility]

ers



     e 25 mg      2-20                               tablet by           ity of



     tablet      00:00:                               mouth           [place]



               00                                 every 6           Medical



                                                  (six)           Branch



                                                  hours as           



                                                  needed for           



                                                  Nausea and           



                                                  Vomiting           



                                                  (N/V).           

 

     dicyclomine      -      Yes            20mg      Take 1           [facility]

ers



     (BENTYL) 20      2-20                               tablet by           ity

 of



     mg tablet      00:00:                               mouth 4           [place]



               00                                 (four)           Medical



                                                  times           Branch



                                                  daily.           

 

     proMETHazin      -      Yes            25mg      Take 1           [facility]

ers



     e 25 mg      2-20                               tablet by           ity of



     tablet      00:00:                               mouth           [place]



               00                                 every 6           Medical



                                                  (six)           Branch



                                                  hours as           



                                                  needed for           



                                                  Nausea and           



                                                  Vomiting           



                                                  (N/V).           

 

     proMETHazin      2017- No             25mg      Take 1           [facility]

[facility]



     e 25 mg      2-20 01-25                          tablet by           ity of



     tablet      00:00: 00:00                          mouth           [place]



               00   :00                           every 6           Medical



                                                  (six)           Branch



                                                  hours as           



                                                  needed for           



                                                  Nausea and           



                                                  Vomiting           



                                                  (N/V).           

 

     dicyclomine      2017- No             20mg      Take 1           [facility]

[facility]



     (BENTYL) 20      2-20 01-15                          tablet by           it

y of



     mg tablet      00:00: 00:00                          mouth 4           [place]

[facility]



               00   :00                           (four)           Medical



                                                  times           Branch



                                                  daily.           

 

     proMETHazin      2017-0      Yes            25mg      Take 1           [facility]

ers



     e 25 mg      1-04                               tablet by           ity of



     tablet      00:00:                               mouth           [place]



               00                                 every 6           Medical



                                                  (six)           Branch



                                                  hours as           



                                                  needed for           



                                                  Nausea and           



                                                  Vomiting           



                                                  (N/V) for           



                                                  up to 12           



                                                  doses.           

 

     proMETHazin      2017-0      Yes            25mg      Take 1           [facility]

ers



     e 25 mg      1-04                               tablet by           ity of



     tablet      00:00:                               mouth           [place]



               00                                 every 6           Medical



                                                  (six)           Branch



                                                  hours as           



                                                  needed for           



                                                  Nausea and           



                                                  Vomiting           



                                                  (N/V) for           



                                                  up to 12           



                                                  doses.           

 

     proMETHazin      2017-0      Yes            25mg      Take 1           [facility]

ers



     e 25 mg      1-04                               tablet by           ity of



     tablet      00:00:                               mouth           [place]



               00                                 every 6           Medical



                                                  (six)           Branch



                                                  hours as           



                                                  needed for           



                                                  Nausea and           



                                                  Vomiting           



                                                  (N/V) for           



                                                  up to 12           



                                                  doses.           

 

     proMETHazin      2017-0      Yes            25mg      Take 1           [facility]

ers



     e 25 mg      1-04                               tablet by           ity of



     tablet      00:00:                               mouth           [place]



               00                                 every 6           Medical



                                                  (six)           [facility]



                                                  hours as           



                                                  needed for           



                                                  Nausea and           



                                                  Vomiting           



                                                  (N/V) for           



                                                  up to 12           



                                                  doses.           

 

     proMETHazin      2017-0      Yes            25mg      Take 1           [facility]

ers



     e 25 mg      1-04                               tablet by           [facility] of



     tablet      00:00:                               mouth           [place]



               00                                 every 6           [facility]



                                                  (six)           [facility]



                                                  hours as           



                                                  needed for           



                                                  Nausea and           



                                                  Vomiting           



                                                  (N/V) for           



                                                  up to 12           



                                                  doses.           

 

     proMETHazin      2017-0      Yes            25mg      Take 1           [facility]

ers



     e 25 mg      1-04                               tablet by           [facility] of



     tablet      00:00:                               mouth           [place]



               00                                 every 6           Medical



                                                  (six)           [facility]



                                                  hours as           



                                                  needed for           



                                                  Nausea and           



                                                  Vomiting           



                                                  (N/V) for           



                                                  up to 12           



                                                  doses.           

 

     proMETHazin      2017-0      Yes            25mg      Take 1           [facility]

ers



     e 25 mg      1-04                               tablet by           [facility] of



     tablet      00:00:                               mouth           [place]



               00                                 every 6           [facility]



                                                  (six)           [facility]



                                                  hours as           



                                                  needed for           



                                                  Nausea and           



                                                  Vomiting           



                                                  (N/V) for           



                                                  up to 12           



                                                  doses.           

 

     proMETHazin      2017-0      Yes            25mg      Take 1           [facility]

ers



     e 25 mg      1-04                               tablet by           [facility] of



     tablet      00:00:                               mouth           [place]



               00                                 every 6           [facility]



                                                  (six)           [facility]



                                                  hours as           



                                                  needed for           



                                                  Nausea and           



                                                  Vomiting           



                                                  (N/V) for           



                                                  up to 12           



                                                  doses.           

 

     proMETHazin            Yes            25mg      Take 1           [facility]

ers



     e 25 mg      1-04                               tablet by           [facility] of



     tablet      00:00:                               mouth           [place]



               00                                 every 6           [facility]



                                                  (six)           [facility]



                                                  hours as           



                                                  needed for           



                                                  Nausea and           



                                                  Vomiting           



                                                  (N/V) for           



                                                  up to 12           



                                                  doses.           

 

     proMETHazin      2022- No             25mg      Take 1           [facility]

vers



     e 25 mg      1-04 05-11                          tablet by           [facility] of



     tablet      00:00: 00:00                          mouth           [place]



               00   :00                           every 6           Medical



                                                  (six)           [facility]



                                                  hours as           



                                                  needed for           



                                                  Nausea and           



                                                  Vomiting           



                                                  (N/V) for           



                                                  up to 12           



                                                  doses.           

 

     Collagenase Collagenase           No             1{appli QD   Collagenas   

        



     250 UNIT/ UNIT/GM                          cation_      e 250        

   



                                        to_affe      UNIT/GM           



                                        cted_ar                     



                                        ea}                      

 

     Pantoprazol Pantoprazol           No             1{table QD   Pantoprazo   

        



     e Sodium 40 e Sodium 40                          t}        le Sodium       

    



     MG   MG                                      40 MG           

 

     Macrobid Macrobid           No             1{capsu BID  Macrobid           



     100  MG                          le_with      100 MG           



                                        _food}                     

 

     SEROquel 25 SEROquel 25           No             1{table      SEROquel     

      



     MG   MG                            t}        25 MG           

 

     SEROquel 25 SEROquel 25           No             1{table      SEROquel     

      



     MG   MG                            t}        25 MG           

 

     Macrobid Macrobid           No             1{capsu BID  Macrobid           



     100  MG                          le_with      100 MG           



                                        _food}                     

 

     Pantoprazol Pantoprazol           No             1{table QD   Pantoprazo   

        



     e Sodium 40 e Sodium 40                          t}        le Sodium       

    



     MG   MG                                      40 MG           

 

     Tylenol Tylenol           No             1{table QID  Tylenol           



     with with                          t_as_ne      with           



     Codeine #4 Codeine #4                          eded}      Codeine #4       

    



     300-60 -60 MG                                    300-60 MG           

 

     Collagenase Collagenase           No             1{appli QD   Collagenas   

        



     250 UNIT/ UNIT/GM                          cation_      e 250        

   



                                        to_affe      UNIT/GM           



                                        cted_ar                     



                                        ea}                      

 

     SEROquel 25 SEROquel 25           No             1{table      SEROquel     

      



     MG   MG                            t}        25 MG           

 

     Macrobid Macrobid           No             1{capsu BID  Macrobid           



     100  MG                          le_with      100 MG           



                                        _food}                     

 

     Tylenol Tylenol           Yes  [facility] [place]                1 tablet           [facility]

mmon



     with with                                    as needed           [facility]



     Codeine #4 Codeine #4                                                   - C

HI



                                                                 [facility]

 

     Macrobid Macrobid           Yes  [facility] [place]                1 capsule          

 Common



                                                  with food           [facility]



                                                                 [facility]

 

     Pantoprazol Pantoprazol           Yes  [facility] [place]                1 tablet     

      Common



     e Sodium e Sodium                                                   [facility]



                                                                 [facility]

 

     Collagenase Collagenase           Yes  [place]                1            

  Common



                                                  applicatio           [facility]



                                                  n to           - [facility]



                                                  affected           [facility]

 

     Pantoprazol Pantoprazol           No             1{table QD   Pantoprazo   

        



     e Sodium 40 e Sodium 40                          t}        le Sodium       

    



     MG   MG                                      40 MG           

 

     Tylenol Tylenol           No             1{table QID  Tylenol           



     with with                          t_as_ne      with           



     Codeine #4 Codeine #4                          eded}      Codeine #4       

    



     300-60 -60 MG                                    300-60 MG           

 

     Collagenase Collagenase           No             1{appli QD   [facility]as   

        



     250 UNIT/ UNIT/GM                          cation_      e 250        

   



                                        to_affe      UNIT/GM           



                                        cted_ar                     



                                        ea}                      

 

     Tylenol Tylenol           No             1{table QID  Tylenol           



     with with                          t_as_ne      with           



     Codeine #4 Codeine #4                          eded}      Codeine #4       

    



     300-60 -60 MG                                    300-60 MG           

 

     Pantoprazol Pantoprazol           No             1{table QD   Pantoprazo   

        



     e Sodium 40 e Sodium 40                          t}        le Sodium       

    



     MG   MG                                      40 MG           

 

     SEROquel 25 SEROquel 25           No             1{table      SEROquel     

      



     MG   MG                            t}        25 MG           

 

     Collagenase Collagenase           No             1{appli QD   [facility]agenas   

        



     250 UNIT/ UNIT/GM                          cation_      e 250        

   



                                        to_affe      UNIT/GM           



                                        cted_ar                     



                                        ea}                      

 

     Macrobid Macrobid           No             1{capsu BID  Macrobid           



     100  MG                          le_with      100 MG           



                                        _food}                     

 

     Tylenol Tylenol           No             1{table QID                 



     with with                          t_as_ne                     



     Codeine #4 Codeine #4                          eded}                     



     300-60 -60 MG                                                   

 

     Pantoprazol Pantoprazol           No             1{table QD                

  



     e Sodium 40 e Sodium 40                          t}                       



     MG   MG                                                     

 

     SEROquel 25 SEROquel 25           No             1{table                   

  



     MG   MG                            t}                       

 

     Collagenase Collagenase           No             1{appli QD                

  



     250 UNIT/ UNIT/GM                          cation_                   

  



                                        to_affe                     



                                        cted_ar                     



                                        ea}                      

 

     Macrobid Macrobid           No             1{capsu BID                 



     100  MG                          le_with                     



                                        _food}                     

 

     SEROquel 25 SEROquel 25           No             1{table      SEROquel     

      



     MG   MG                            t}        25 MG           

 

     Macrobid Macrobid           No             1{capsu BID  Macrobid           



     100  MG                          le_with      100 MG           



                                        _food}                     

 

     Collagenase Collagenase           No             1{appli QD   [facility]agenas   

        



     250 UNIT/ UNIT/GM                          cation_      e 250        

   



                                        to_affe      UNIT/GM           



                                        cted_ar                     



                                        ea}                      

 

     Pantoprazol Pantoprazol           No             1{table QD   Pantoprazo   

        



     e Sodium 40 e Sodium 40                          t}        le Sodium       

    



     MG   MG                                      40 MG           

 

     Tylenol Tylenol           No             1{table QID  Tylenol           



     with with                          t_as_ne      with           



     Codeine #4 Codeine #4                          eded}      Codeine #4       

    



     300-60 -60 MG                                    300-60 MG           

 

     Tylenol Tylenol           No             1{table QID  Tylenol           



     with with                          t_as_ne      with           



     Codeine #4 Codeine #4                          eded}      Codeine #4       

    



     300-60 -60 MG                                    300-60 MG           

 

     Collagenase Collagenase           No             1{appli QD   [facility]agenas   

        



     250 UNIT/ UNIT/GM                          cation_      e 250        

   



                                        to_affe      UNIT/GM           



                                        cted_ar                     



                                        ea}                      

 

     Pantoprazol Pantoprazol           No             1{table QD   Pantoprazo   

        



     e Sodium 40 e Sodium 40                          t}        le Sodium       

    



     MG   MG                                      40 MG           

 

     Macrobid Macrobid           No             1{capsu BID  Macrobid           



     100  MG                          le_with      100 MG           



                                        _food}                     

 

     SEROquel 25 SEROquel 25           No             1{table      SEROquel     

      



     MG   MG                            t}        25 MG           

 

     Tylenol Tylenol           No             1{table QID  Tylenol           



     with with                          t_as_ne      with           



     Codeine #4 Codeine #4                          eded}      Codeine #4       

    



     300-60 -60 MG                                    300-60 MG           







Immunizations







           Ordered    Filled Immunization Date       Status     Comments   [facility]

e



           Immunization Name Name                                        

 

           SARS-COV-2 COVID-19            2021 Completed             [facility]

rsity of



           MODERNA, 6MO-5YRS,            00:00:00                         [place] 

[facility]



           0.25ML VACCINE                                             [facility]

 

           SARS-COV-2 COVID-19            2021 Completed             [facility]

rsity of



           MODERNA, 6MO-5YRS,            00:00:00                         [place] 

[facility]



           0.25ML VACCINE                                             [facility]

 

           SARS-COV-2 COVID-19            2021 Completed             [facility]

rsity of



           MODERNA, 6MO-5YRS,            00:00:00                         [place] 

[facility]



           0.25ML VACCINE                                             [facility]

 

           SARS-COV-2 COVID-19            2021 Completed             [facility]

rsity of



           MODERNA, 6MO-5YRS,            00:00:00                         [place] 

[facility]



           0.25ML VACCINE                                             [facility]

 

           SARS-COV-2 COVID-19            2021 Completed             [facility]

rsity of



           MODERNA, 6MO-5YRS,            00:00:00                         [place] 

[facility]



           0.25ML VACCINE                                             [facility]

 

           SARS-COV-2 COVID-19            2021 Completed             [facility]

rsity of



           MODERNA, 6MO-5YRS,            00:00:00                         [place] 

[facility]



           0.25ML VACCINE                                             [facility]

 

           SARS-COV-2 COVID-19            2021 Completed             [facility]

rsity of



           MODERNA, 6MO-5YRS,            00:00:00                         [place] 

[facility]



           0.25ML VACCINE                                             [facility]

 

           SARS-COV-2 COVID-19            2021 Completed             [facility]

rsity of



           MODERNA, 6MO-5YRS,            00:00:00                         [place] 

[facility]



           0.25ML VACCINE                                             [facility]

 

           SARS-COV-2 COVID-19            2021 Completed             Un[facility]

rsity of



           MODERNA, 6MO-5YRS,            00:00:00                         [place] 

[facility]



           0.25ML VACCINE                                             [facility]

 

           SARS-COV-2 COVID-19            2021 Completed             [facility]

rsity of



           MODERNA, 6MO-5YRS,            00:00:00                         [place] 

[facility]



           0.25ML VACCINE                                             [facility]

 

           SARS-COV-2 COVID-19            2021 Completed             [facility]

rsity of



           MODERNA, 6MO-5YRS,            00:00:00                         [place] 

[facility]



           0.25ML VACCINE                                             [facility]

 

           SARS-COV-2 COVID-19            2021 Completed             Un[facility]

rsity of



           MODERNA, 6MO-5YRS,            00:00:00                         [place] 

[facility]



           0.25ML VACCINE                                             [facility]

 

           SARS-COV-2 COVID-19            2021 Completed             Un[facility]

rsity of



           MODERNA, 6MO-5YRS,            00:00:00                         [place] 

[facility]



           0.25ML VACCINE                                             [facility]

 

           SARS-COV-2 COVID-19            2021 Completed             Un[facility]

rsity of



           MODERNA, 6MO-5YRS,            00:00:00                         [place] 

[facility]



           0.25ML VACCINE                                             [facility]

 

           SARS-COV-2 COVID-19            2021 Completed             Un[facility]

rsity of



           MODERNA, 6MO-5YRS,            00:00:00                         [place] 

[facility]



           0.25ML VACCINE                                             [facility]

 

           SARS-COV-2 COVID-19            2021 Completed             Un[facility]

rsity of



           MODERNA, 6MO-5YRS,            00:00:00                         [place] 

[facility]



           0.25ML VACCINE                                             [facility]

 

           SARS-COV-2 COVID-19            2021 Completed             Un[facility]

rsity of



           MODERNA, 6MO-5YRS,            00:00:00                         [place] 

[facility]



           0.25ML VACCINE                                             Br[facility]

 

           SARS-COV-2 COVID-19            2021 Completed             Unive

rsity of



           MODERNA, 6MO-5YRS,            00:00:00                         [place] 

[facility]



           0.25ML VACCINE                                             Branch

 

           SARS-COV-2 COVID-19            2021 Completed             Unive

rsity of



           MODERNA, 6MO-5YRS,            00:00:00                         [place] 

[facility]



           0.25ML VACCINE                                             [facility]anch

 

           SARS-COV-2 COVID-19            2021 Completed             Unive

rsity of



           MODERNA, 6MO-5YRS,            00:00:00                         [place] 

[facility]



           0.25ML VACCINE                                             Branch

 

           SARS-COV-2 COVID-19            2021 Completed             Unive

rsity of



           MODERNA, 6MO-5YRS,            00:00:00                         [place] 

[facility]



           0.25ML VACCINE                                             Branch

 

           SARS-COV-2 COVID-19            2021 Completed             Unive

rsity of



           MODERNA, 6MO-5YRS,            00:00:00                         [place] 

[facility]



           0.25ML VACCINE                                             Branch

 

           SARS-COV-2 COVID-19            2021 Completed             Unive

rsity of



           MODERNA, 6MO-5YRS,            00:00:00                         [place] 

[facility]



           0.25ML VACCINE                                             Branch

 

           SARS-COV-2 COVID-19            2021 Completed             Unive

rsity of



           MODERNA, 6MO-5YRS,            00:00:00                         [place] 

[facility]



           0.25ML VACCINE                                             Branch

 

           SARS-COV-2 COVID-19            2021 Completed             Un[facility]

rsity of



           MODERNA, 6MO-5YRS,            00:00:00                         [place] 

[facility]



           0.25ML VACCINE                                             Branch

 

           SARS-COV-2 COVID-19            2021 Completed             Unive

rsity of



           MODERNA, 6MO-5YRS,            00:00:00                         [place] 

[facility]



           0.25ML VACCINE                                             Branch

 

           SARS-COV-2 COVID-19            2021 Completed             Unive

rsity of



           MODERNA, 6MO-5YRS,            00:00:00                         [place] 

[facility]



           0.25ML VACCINE                                             Branch

 

           SARS-COV-2 COVID-19            2021 Completed             Unive

rsity of



           MODERNA, 6MO-5YRS,            00:00:00                         [place] 

[facility]



           0.25ML VACCINE                                             Branch

 

           SARS-COV-2 COVID-19            2021 Completed             Un[facility]

rsity of



           MODERNA, 6MO-5YRS,            00:00:00                         [facility]



           0.25ML VACCINE                                             Br[facility]

 

           SARS-COV-2 COVID-19            2021 Completed             [facility]

[facility] of



           MODERNA, 6MO-5YRS,            00:00:00                         [facility]



           0.25ML VACCINE                                             Branch

 

           SARS-COV-2 COVID-19            2021 Completed             [facility]

[facility] of



           MODERNA, 6MO-5YRS,            00:00:00                         [facility]



           0.25ML VACCINE                                             Branch

 

           SARS-COV-2 COVID-19            2021 Completed             [facility]

[facility] of



           MODERNA, 6MO-5YRS,            00:00:00                         [facility]



           0.25ML VACCINE                                             Branch

 

           SARS-COV-2 COVID-19            2021 Completed             [facility]

rs[facility] of



           MODERNA, 6MO-5YRS,            00:00:00                         [facility]



           0.25ML VACCINE                                             Branch

 

           SARS-COV-2 COVID-19            2021 Completed             [facility]

[facility] of



           MODERNA, 6MO-5YRS,            00:00:00                         [facility]



           0.25ML VACCINE                                             Branch

 

           SARS-COV-2 COVID-19            2021 Completed             [facility]

rs[facility] of



           MODERNA, 6MO-5YRS,            00:00:00                         [facility]



           0.25ML VACCINE                                             Branch

 

           SARS-COV-2 COVID-19            2021 Completed             [facility]

rs[facility] of



           MODERNA, 6MO-5YRS,            00:00:00                         [facility]



           0.25ML VACCINE                                             Branch

 

           SARS-COV-2 COVID-19            2021 Completed             [facility]

rs[facility] of



           MODERNA, 6MO-5YRS,            00:00:00                         [facility]



           0.25ML VACCINE                                             Branch

 

           SARS-COV-2 COVID-19            2021 Completed             [facility]

rs[facility] of



           MODERNA, 6MO-5YRS,            00:00:00                         [facility]



           0.25ML VACCINE                                             Branch

 

           SARS-COV-2 COVID-19            2021 Completed             [facility]

rsity of



           MODERNA, 6MO-5YRS,            00:00:00                         [facility]



           0.25ML VACCINE                                             Branch

 

           SARS-COV-2 COVID-19            2021 Completed             [facility]

rsity of



           MODERNA, 6MO-5YRS,            00:00:00                         [facility]



           0.25ML VACCINE                                             Branch

 

           SARS-COV-2 COVID-19            2021 Completed             [facility]

rs[facility] of



           MODERNA, 6MO-5YRS,            00:00:00                         [facility]



           0.25ML VACCINE                                             Branch

 

           SARS-COV-2 COVID-19            2021 Completed             [facility]

[facility] of



           MODERNA, 6MO-5YRS,            00:00:00                         [facility]



           0.25ML VACCINE                                             Branch

 

           SARS-COV-2 COVID-19            2021 Completed             [facility]

rsity of



           MODERNA, 6MO-5YRS,            00:00:00                         [facility]



           0.25ML VACCINE                                             Branch

 

           SARS-COV-2 COVID-19            2021 Completed             [facility]

rsity of



           MODERNA, 6MO-5YRS,            00:00:00                         [facility]



           0.25ML VACCINE                                             Branch

 

           SARS-COV-2 COVID-19            2021 Completed             [facility]

rsity of



           MODERNA, 6MO-5YRS,            00:00:00                         [facility]



           0.25ML VACCINE                                             Branch

 

           SARS-COV-2 COVID-19            2021 Completed             [facility]

rs[facility] of



           MODERNA, 6MO-5YRS,            00:00:00                         [facility]



           0.25ML VACCINE                                             Branch

 

           SARS-COV-2 COVID-19            2021 Completed             [facility]

rsity of



           MODERNA, 6MO-5YRS,            00:00:00                         [facility]



           0.25ML VACCINE                                             Branch

 

           SARS-COV-2 COVID-19            2021 Completed             [facility]

rsity of



           MODERNA, 6MO-5YRS,            00:00:00                         [facility]



           0.25ML VACCINE                                             Branch

 

           SARS-COV-2 COVID-19            2021 Completed             [facility]

rs[facility] of



           MODERNA, 6MO-5YRS,            00:00:00                         [facility]



           0.25ML VACCINE                                             Branch

 

           SARS-COV-2 COVID-19            2021 Completed             [facility]

rs[facility] of



           MODERNA, 6MO-5YRS,            00:00:00                         [facility]



           0.25ML VACCINE                                             Branch

 

           SARS-COV-2 COVID-19            2021 Completed             [facility]

rsity of



           MODERNA, 6MO-5YRS,            00:00:00                         [facility]



           0.25ML VACCINE                                             Branch

 

           SARS-COV-2 COVID-19            2021 Completed             [facility]

rs[facility] of



           MODERNA, 6MO-5YRS,            00:00:00                         [facility]



           0.25ML VACCINE                                             Branch

 

           SARS-COV-2 COVID-19            2021 Completed             [facility]

rs[facility] of



           MODERNA, 6MO-5YRS,            00:00:00                         [facility]



           0.25ML VACCINE                                             Branch

 

           SARS-COV-2 COVID-19            2021 Completed             [facility]

[facility] of



           MODERNA, 6MO-5YRS,            00:00:00                         [facility]



           0.25ML VACCINE                                             Branch

 

           SARS-COV-2 COVID-19            2021 Completed             [facility]

rs[facility] of



           MODERNA, 6MO-5YRS,            00:00:00                         [facility]



           0.25ML VACCINE                                             Branch

 

           SARS-COV-2 COVID-19            2021 Completed             [facility]

rs[facility] of



           MODERNA, 6MO-5YRS,            00:00:00                         [facility]



           0.25ML VACCINE                                             Branch

 

           SARS-COV-2 COVID-19            2020 Completed             [facility]

rs[facility] of



           MODERNA 12+ YRS            00:00:00                         [facility]

[facility]



           VACCINE                                                Branch

 

           SARS-COV-2 COVID-19            2020 Completed             [facility]

rs[facility] of



           MODERNA 12+ YRS            00:00:00                         [facility]

[facility]



           VACCINE                                                Branch

 

           SARS-COV-2 COVID-19            2020 Completed             [facility]

rs[facility] of



           MODERNA 12+ YRS            00:00:00                         [facility]

[facility]



           VACCINE                                                Branch

 

           SARS-COV-2 COVID-19            2020 Completed             [facility]

rs[facility] of



           MODERNA 12+ YRS            00:00:00                         [facility]

[facility]



           VACCINE                                                Branch

 

           SARS-COV-2 COVID-19            2020 Completed             [facility]

rs[facility] of



           MODERNA 12+ YRS            00:00:00                         [facility]



           VACCINE                                                Branch

 

           SARS-COV-2 COVID-19            2020 Completed             [facility]

[facility] of



           MODERNA 12+ YRS            00:00:00                         [facility]



           VACCINE                                                Branch

 

           SARS-COV-2 COVID-19            2020 Completed             [facility]

rs[facility] of



           MODERNA 12+ YRS            00:00:00                         [facility]



           VACCINE                                                [facility]anch

 

           SARS-COV-2 COVID-19            2020 Completed             [facility]

rs[facility] of



           MODERNA 12+ YRS            00:00:00                         [facility]



           VACCINE                                                [facility]anch

 

           SARS-COV-2 COVID-19            2020 Completed             [facility]

[facility] of



           MODERNA 12+ YRS            00:00:00                         [facility]



           VACCINE                                                Branch

 

           SARS-COV-2 COVID-19            2020 Completed             [facility]

rs[facility] of



           MODERNA 12+ YRS            00:00:00                         [facility]



           VACCINE                                                [facility]anch

 

           SARS-COV-2 COVID-19            2020 Completed             [facility]

rs[facility] of



           MODERNA 12+ YRS            00:00:00                         [facility]



           VACCINE                                                Branch

 

           SARS-COV-2 COVID-19            2020 Completed             [facility]

rs[facility] of



           MODERNA 12+ YRS            00:00:00                         [facility]



           VACCINE                                                Branch

 

           SARS-COV-2 COVID-19            2020 Completed             [facility]

rs[facility] of



           MODERNA 12+ YRS            00:00:00                         [facility]



           VACCINE                                                Branch

 

           SARS-COV-2 COVID-19            2020 Completed             [facility]

rs[facility] of



           MODERNA 12+ YRS            00:00:00                         [facility]



           VACCINE                                                Branch

 

           SARS-COV-2 COVID-19            2020 Completed             [facility]

rs[facility] of



           MODERNA 12+ YRS            00:00:00                         [facility]



           VACCINE                                                Branch

 

           SARS-COV-2 COVID-19            2020 Completed             [facility]

rs[facility] of



           MODERNA 12+ YRS            00:00:00                         [facility]



           VACCINE                                                Branch

 

           SARS-COV-2 COVID-19            2020 Completed             [facility]

rs[facility] of



           MODERNA 12+ YRS            00:00:00                         [facility]



           VACCINE                                                Branch

 

           SARS-COV-2 COVID-19            2020 Completed             [facility]

rs[facility] of



           MODERNA 12+ YRS            00:00:00                         [facility]



           VACCINE                                                Branch

 

           SARS-COV-2 COVID-19            2020 Completed             [facility]

rs[facility] of



           MODERNA 12+ YRS            00:00:00                         [facility]



           VACCINE                                                Branch

 

           SARS-COV-2 COVID-19            2020 Completed             [facility]

rs[facility] of



           MODERNA 12+ YRS            00:00:00                         [facility]



           VACCINE                                                Branch

 

           SARS-COV-2 COVID-19            2020 Completed             [facility]

rs[facility] of



           MODERNA 12+ YRS            00:00:00                         [facility]



           VACCINE                                                Branch

 

           SARS-COV-2 COVID-19            2020 Completed             [facility]

rs[facility] of



           MODERNA 12+ YRS            00:00:00                         [facility]



           VACCINE                                                Branch

 

           SARS-COV-2 COVID-19            2020 Completed             [facility]

rs[facility] of



           MODERNA 12+ YRS            00:00:00                         [facility]



           VACCINE                                                Branch

 

           SARS-COV-2 COVID-19            2020 Completed             Un[facility]

rsity of



           MODERNA 12+ YRS            00:00:00                         [facility]



           VACCINE                                                Branch

 

           SARS-COV-2 COVID-19            2020 Completed             Un[facility]

rsity of



           MODERNA 12+ YRS            00:00:00                         [facility]



           VACCINE                                                Branch

 

           SARS-COV-2 COVID-19            2020 Completed             Un[facility]

rsity of



           MODERNA 12+ YRS            00:00:00                         [facility]

[facility]



           VACCINE                                                Branch

 

           SARS-COV-2 COVID-19            2020 Completed             [facility]

rsity of



           MODERNA 12+ YRS            00:00:00                         [facility]



           VACCINE                                                Branch

 

           SARS-COV-2 COVID-19            2020 Completed             [facility]

rsity of



           MODERNA 12+ YRS            00:00:00                         [facility]



           VACCINE                                                Branch

 

           SARS-COV-2 COVID-19            2020 Completed             [facility]

rsity of



           MODERNA 12+ YRS            00:00:00                         [facility]

[facility]



           VACCINE                                                Branch

 

           SARS-COV-2 COVID-19            2020 Completed             Un[facility]

rsity of



           MODERNA 12+ YRS            00:00:00                         [facility]

[facility]



           VACCINE                                                Branch

 

           SARS-COV-2 COVID-19            2020 Completed             [facility]

rsity of



           MODERNA 12+ YRS            00:00:00                         [facility]

[facility]



           VACCINE                                                Branch

 

           SARS-COV-2 COVID-19            2020 Completed             [facility]

rsity of



           MODERNA 12+ YRS            00:00:00                         [facility]

[facility]



           VACCINE                                                Branch

 

           SARS-COV-2 COVID-19            2020 Completed             [facility]

rsity of



           MODERNA 12+ YRS            00:00:00                         [facility]

[facility]



           VACCINE                                                Branch

 

           SARS-COV-2 COVID-19            2020 Completed             [facility]

rsity of



           MODERNA 12+ YRS            00:00:00                         [facility]

[facility]



           VACCINE                                                Branch

 

           SARS-COV-2 COVID-19            2020 Completed             [facility]

rsity of



           MODERNA 12+ YRS            00:00:00                         [facility]

[facility]



           VACCINE                                                Branch

 

           SARS-COV-2 COVID-19            2020 Completed             [facility]

rsity of



           MODERNA 12+ YRS            00:00:00                         [facility]

[facility]



           VACCINE                                                Branch

 

           SARS-COV-2 COVID-19            2020 Completed             [facility]

rsity of



           MODERNA 12+ YRS            00:00:00                         [facility]

[facility]



           VACCINE                                                Branch

 

           SARS-COV-2 COVID-19            2020 Completed             [facility]

rsity of



           MODERNA 12+ YRS            00:00:00                         [facility]

[facility]



           VACCINE                                                Branch

 

           SARS-COV-2 COVID-19            2020 Completed             [facility]

rs[facility] of



           MODERNA 12+ YRS            00:00:00                         [facility]

[facility]



           VACCINE                                                Branch

 

           SARS-COV-2 COVID-19            2020 Completed             [facility]

rsity of



           MODERNA 12+ YRS            00:00:00                         [facility]

[facility]



           VACCINE                                                Branch

 

           SARS-COV-2 COVID-19            2020 Completed             [facility]

rs[facility] of



           MODERNA 12+ YRS            00:00:00                         [facility]

[facility]



           VACCINE                                                Branch

 

           SARS-COV-2 COVID-19            2020 Completed             [facility]

rs[facility] of



           MODERNA 12+ YRS            00:00:00                         [facility]

[facility]



           VACCINE                                                Branch

 

           SARS-COV-2 COVID-19            2020 Completed             [facility]

rsity of



           MODERNA 12+ YRS            00:00:00                         [facility]

[facility]



           VACCINE                                                Branch

 

           SARS-COV-2 COVID-19            2020 Completed             Un[facility]

rsity of



           MODERNA 12+ YRS            00:00:00                         [facility]

[facility]



           VACCINE                                                Branch

 

           SARS-COV-2 COVID-19            2020 Completed             Un[facility]

rsity of



           MODERNA 12+ YRS            00:00:00                         [facility]

[facility]



           VACCINE                                                Branch

 

           SARS-COV-2 COVID-19            2020 Completed             [facility]

rs[facility] of



           MODERNA 12+ YRS            00:00:00                         [facility]

[facility]



           VACCINE                                                Branch

 

           SARS-COV-2 COVID-19            2020 Completed             [facility]

rsity of



           MODERNA 12+ YRS            00:00:00                         [facility]

[facility]



           VACCINE                                                Branch

 

           SARS-COV-2 COVID-19            2020 Completed             Un[facility]

rsity of



           MODERNA 12+ YRS            00:00:00                         [facility]

[facility]



           VACCINE                                                Branch

 

           SARS-COV-2 COVID-19            2020 Completed             Unive

rsity of



           MODERNA 12+ YRS            00:00:00                         [facility]

[facility]



           VACCINE                                                Branch

 

           SARS-COV-2 COVID-19            2020 Completed             Un[facility]

rsity of



           MODERNA 12+ YRS            00:00:00                         [facility]

[facility]



           VACCINE                                                Branch

 

           SARS-COV-2 COVID-19            2020 Completed             Un[facility]

rsity of



           MODERNA 12+ YRS            00:00:00                         [facility]

[facility]



           VACCINE                                                Branch

 

           SARS-COV-2 COVID-19            2020 Completed             Unive

rsity of



           MODERNA 12+ YRS            00:00:00                         [facility]

[facility]



           VACCINE                                                Branch

 

           SARS-COV-2 COVID-19            2020 Completed             Un[facility]

rsity of



           MODERNA 12+ YRS            00:00:00                         [facility]

[facility]



           VACCINE                                                Branch

 

           SARS-COV-2 COVID-19            2020 Completed             Un[facility]

rsity of



           MODERNA 12+ YRS            00:00:00                         [facility]

[facility]



           VACCINE                                                Branch

 

           SARS-COV-2 COVID-19            2020 Completed             Un[facility]

rsity of



           MODERNA 12+ YRS            00:00:00                         [facility]

[facility]



           VACCINE                                                Branch

 

           SARS-COV-2 COVID-19            2020 Completed             Unive

rsity of



           MODERNA 12+ YRS            00:00:00                         [facility]

[facility]



           VACCINE                                                Branch

 

           SARS-COV-2 COVID-19            2020 Completed             Unive

rsity of



           MODERNA 12+ YRS            00:00:00                         [facility]

[facility]



           VACCINE                                                Branch

 

           SARS-COV-2 COVID-19            2020 Completed             Un[facility]

rsity of



           MODERNA 12+ YRS            00:00:00                         [facility]

[facility]



           VACCINE                                                Branch

 

           SARS-COV-2 COVID-19            2020 Completed             Un[facility]

rsity of



           MODERNA 12+ YRS            00:00:00                         [facility]

[facility]



           VACCINE                                                Branch

 

           SARS-COV-2 COVID-19            2020 Completed             Unive

rsity of



           MODERNA 12+ YRS            00:00:00                         [facility]

[facility]



           VACCINE                                                Branch

 

           SARS-COV-2 COVID-19            2020 Completed             [facility]

rsity of



           MODERNA 12+ YRS            00:00:00                         [facility]

[facility]



           VACCINE                                                Branch

 

           SARS-COV-2 COVID-19            2020 Completed             [facility]

rsity of



           MODERNA 12+ YRS            00:00:00                         [facility]

[facility]



           VACCINE                                                Branch

 

           SARS-COV-2 COVID-19            2020 Completed             Unive

rsity of



           MODERNA 12+ YRS            00:00:00                         [facility]

[facility]



           VACCINE                                                Branch

 

           SARS-COV-2 COVID-19            2020 Completed             Un[facility]

rsity of



           MODERNA 12+ YRS            00:00:00                         [facility]

[facility]



           VACCINE                                                Branch

 

           SARS-COV-2 COVID-19            2020 Completed             Unive

rsity of



           MODERNA 12+ YRS            00:00:00                         [facility]

[facility]



           VACCINE                                                Br[facility]

 

           SARS-COV-2 COVID-19            2020 Completed             Un[facility]

rsity of



           MODERNA 12+ YRS            00:00:00                         [facility]

[facility]



           VACCINE                                                Br[facility]

 

           SARS-COV-2 COVID-19            2020 Completed             Un[facility]

rsity of



           MODERNA 12+ YRS            00:00:00                         [facility]

[facility]



           VACCINE                                                Branch

 

           SARS-COV-2 COVID-19            2020 Completed             [facility]

rsity of



           MODERNA 12+ YRS            00:00:00                         [facility]

[facility]



           VACCINE                                                [facility]

 

           SARS-COV-2 COVID-19            2020 Completed             [facility]

[facility] of



           MODERNA 12+ YRS            00:00:00                         [facility]

[facility]



           VACCINE                                                [facility]

 

           SARS-COV-2 COVID-19            2020 Completed             [facility]

rsity of



           MODERNA 12+ YRS            00:00:00                         [facility]

[facility]



           VACCINE                                                [facility]

 

           SARS-COV-2 COVID-19            2020 Completed             [facility]

rsity of



           MODERNA 12+ YRS            00:00:00                         [facility]

[facility]



           VACCINE                                                [facility]

 

           SARS-COV-2 COVID-19            2020 Completed             Unive

rsity of



           MODERNA 12+ YRS            00:00:00                         [facility]

[facility]



           VACCINE                                                [facility]

 

           SARS-COV-2 COVID-19            2020 Completed             Unive

rsity of



           MODERNA 12+ YRS            00:00:00                         [facility]

[facility]



           VACCINE                                                [facility]

 

           SARS-COV-2 COVID-19            2020 Completed             Unive

rsity of



           MODERNA 12+ YRS            00:00:00                         [facility]

[facility]



           VACCINE                                                [facility]

 

           SARS-COV-2 COVID-19            2020 Completed             Unive

rsity of



           MODERNA 12+ YRS            00:00:00                         [facility]

[facility]



           VACCINE                                                [facility]

 

           SARS-COV-2 COVID-19            2020 Completed             Unive

rsity of



           MODERNA 12+ YRS            00:00:00                         [facility]

[facility]



           VACCINE                                                [facility]

 

           SARS-COV-2 COVID-19            2020 Completed             Unive

rsity of



           MODERNA 12+ YRS            00:00:00                         [facility]

[facility]



           VACCINE                                                [facility]

 

           SARS-COV-2 COVID-19            2020 Completed             Unive

rsity of



           MODERNA 12+ YRS            00:00:00                         [facility]

[facility]



           VACCINE                                                [facility]

 

           SARS-COV-2 COVID-19            2020 Completed             Unive

rsity of



           MODERNA 12+ YRS            00:00:00                         [facility]

[facility]



           VACCINE                                                [facility]

 

           SARS-COV-2 COVID-19            2020 Completed             Unive

rsity of



           MODERNA 12+ YRS            00:00:00                         [facility]

[facility]



           VACCINE                                                [facility]

 

           SARS-COV-2 COVID-19            2020 Completed             Unive

rsity of



           MODERNA 12+ YRS            00:00:00                         [facility]

[facility]



           VACCINE                                                [facility]

 

           SARS-COV-2 COVID-19            2020 Completed             Unive

rsity of



           MODERNA 12+ YRS            00:00:00                         [facility]

[facility]



           VACCINE                                                [facility]

 

           SARS-COV-2 COVID-19            2020 Completed             Unive

rsity of



           MODERNA 12+ YRS            00:00:00                         [facility]

[facility]



           VACCINE                                                [facility]

 

           SARS-COV-2 COVID-19            2020 Completed             Unive

rsity of



           MODERNA 12+ YRS            00:00:00                         [facility]

[facility]



           VACCINE                                                [facility]

 

           SARS-COV-2 COVID-19            2020 Completed             Unive

rsity of



           MODERNA 12+ YRS            00:00:00                         [facility]

[facility]



           VACCINE                                                [facility]

 

           SARS-COV-2 COVID-19            2020 Completed             Unive

rsity of



           MODERNA 12+ YRS            00:00:00                         [facility]

[facility]



           VACCINE                                                [facility]

 

           SARS-COV-2 COVID-19            2020 Completed             Unive

rsity of



           MODERNA 12+ YRS            00:00:00                         [facility]

[facility]



           VACCINE                                                [facility]

 

           SARS-COV-2 COVID-19            2020 Completed             Unive

rsity of



           MODERNA 12+ YRS            00:00:00                         [facility]

[facility]



           VACCINE                                                [facility]

 

           SARS-COV-2 COVID-19            2020 Completed             Unive

rsity of



           MODERNA 12+ YRS            00:00:00                         [facility]

[facility]



           VACCINE                                                [facility]

 

           SARS-COV-2 COVID-19            2020 Completed             Un[facility]

rsity of



           MODERNA 12+ YRS            00:00:00                         [facility]

[facility]



           VACCINE                                                [facility]

 

           SARS-COV-2 COVID-19            2020 Completed             Unive

rsity of



           MODERNA 12+ YRS            00:00:00                         [facility]

[facility]



           VACCINE                                                [facility]

 

           SARS-COV-2 COVID-19            2020 Completed             [facility]

rsity of



           MODERNA 12+ YRS            00:00:00                         [facility]

[facility]



           VACCINE                                                [facility]

 

           SARS-COV-2 COVID-19            2020 Completed             Un[facility]

rsity of



           MODERNA 12+ YRS            00:00:00                         [facility]

[facility]



           VACCINE                                                [facility]

 

           SARS-COV-2 COVID-19            2020 Completed             [facility]

rsity of



           MODERNA 12+ YRS            00:00:00                         [facility]

[facility]



           VACCINE                                                [facility]

 

           SARS-COV-2 COVID-19            2020 Completed             Unive

rsity of



           MODERNA 12+ YRS            00:00:00                         [facility]

[facility]



           VACCINE                                                [facility]

 

           SARS-COV-2 COVID-19            2020 Completed             [facility]

rsity of



           MODERNA 12+ YRS            00:00:00                         [facility]

[facility]



           VACCINE                                                [facility]

 

           SARS-COV-2 COVID-19            2020 Completed             [facility]

rsity of



           MODERNA 12+ YRS            00:00:00                         [facility]

[facility]



           VACCINE                                                [facility]

 

           SARS-COV-2 COVID-19            2020 Completed             [facility]

rsity of



           MODERNA 12+ YRS            00:00:00                         [facility]

[facility]



           VACCINE                                                [facility]

 

           SARS-COV-2 COVID-19            2020 Completed             [facility]

rsity of



           MODERNA 12+ YRS            00:00:00                         [facility]

[facility]



           VACCINE                                                [facility]

 

           SARS-COV-2 COVID-19            2020 Completed             [facility]

rsity of



           MODERNA 12+ YRS            00:00:00                         [facility]

[facility]



           VACCINE                                                [facility]

 

           SARS-COV-2 COVID-19            2020 Completed             [facility]

rsity of



           MODERNA 12+ YRS            00:00:00                         [facility]

[facility]



           VACCINE                                                [facility]

 

           SARS-COV-2 COVID-19            2020 Completed             [facility]

rsity of



           MODERNA 12+ YRS            00:00:00                         [facility]

[facility]



           VACCINE                                                [facility]

 

           SARS-COV-2 COVID-19            2020 Completed             [facility]

rsity of



           MODERNA 12+ YRS            00:00:00                         [facility]

[facility]



           VACCINE                                                [facility]

 

           SARS-COV-2 COVID-19            2020 Completed             [facility]

rsity of



           MODERNA 12+ YRS            00:00:00                         [facility]

[facility]



           VACCINE                                                [facility]

 

           SARS-COV-2 COVID-19            2020 Completed             Un[facility]

rsity of



           MODERNA 12+ YRS            00:00:00                         [facility]

[facility]



           VACCINE                                                [facility]

 

           SARS-COV-2 COVID-19            2020 Completed             Un[facility]

rsity of



           MODERNA 12+ YRS            00:00:00                         [facility]

[facility]



           VACCINE                                                [facility]

 

           SARS-COV-2 COVID-19            2020 Completed             Un[facility]

rsity of



           MODERNA 12+ YRS            00:00:00                         [facility]

[facility]



           VACCINE                                                [facility]

 

           SARS-COV-2 COVID-19            2020 Completed             [facility]

rsity of



           MODERNA 12+ YRS            00:00:00                         [facility]

[facility]



           VACCINE                                                [facility]

 

           SARS-COV-2 COVID-19            2020 Completed             Unive

rsity of



           MODERNA 12+ YRS            00:00:00                         [facility]

[facility]



           VACCINE                                                [facility]

 

           SARS-COV-2 COVID-19            2020 Completed             Unive

rsity of



           MODERNA 12+ YRS            00:00:00                         [facility]

[facility]



           VACCINE                                                [facility]

 

           SARS-COV-2 COVID-19            2020 Completed             Unive

rsity of



           MODERNA 12+ YRS            00:00:00                         [facility]

[facility]



           VACCINE                                                [facility]

 

           SARS-COV-2 COVID-19            2020 Completed             Unive

rsity of



           MODERNA 12+ YRS            00:00:00                         [facility]

[facility]



           VACCINE                                                [facility]







Vital Signs







             Vital Name   Observation Time Observation Value Comments     Source

 

             Systolic blood 2023 23:00:00 108 mm[Hg]                [facility]

[facility] of



             pressure                                            [facility]

 

             Diastolic blood 2023 23:00:00 64 mm[Hg]                 [facility]

rsity of



             pressure                                            [facility]

 

             [facility]   2023 23:00:00 106 /min                  [facility]

[facility]

 

             Respiratory rate 2023 23:00:00 16 /min                   Univ

ersity of



                                                                 [facility]

 

             Oxygen saturation 2023 23:00:00 96 /min                   Uni

versity of



             in Arterial blood                                        [facility]

[facility]



             by Pulse oximetry                                        Branch

 

             Body temperature 2023 19:25:00 36.78 Tiffanie                 Univ

ersity of



                                                                 [facility]

 

             Body height  2023 19:25:00 149.9 cm                  [facility]

[facility]



                                                                 [facility]



                                                                 [facility]

 

             Body weight  2023 19:25:00 127.007 kg                [facility]

[facility]



                                                                 [facility]



                                                                 [facility]anch

 

             BMI          2023 19:25:00 56.55 kg/m2               [facility]

ty of



                                                                 [facility]



                                                                 [facility]

 

             Systolic blood 2023 23:00:00 119 mm[Hg]                [facility]

sity of



             pressure                                            [facility]



                                                                 Branch

 

             Diastolic blood 2023 23:00:00 92 mm[Hg]                 [facility]

rsity of



             [facility]



                                                                 [facility]

 

             Heart rate   2023 23:00:00 94 /min                   [facility]

ty of



                                                                 [facility]



                                                                 Branch

 

             Respiratory rate 2023 23:00:00 12 /min                   Univ

ersity of



                                                                 [facility]



                                                                 Branch

 

             Oxygen saturation 2023 23:00:00 99 /min                   Uni

versity of



             in Arterial blood                                        [facility]

[facility]



             by Pulse oximetry                                        Branch

 

             Body temperature 2023 19:36:00 37 Tiffanie                    Univ

ersity of



                                                                 [facility]



                                                                 Branch

 

             Body height  2023 19:36:00 162.6 cm                  [facility]

ty of



                                                                 [facility]



                                                                 [facility]

 

             Body weight  2023 19:36:00 117.935 kg                [facility]

ty of



                                                                 [facility]



                                                                 [facility]anch

 

             BMI          2023 19:36:00 44.63 kg/m2               [facility]

ty of



                                                                 [facility]



                                                                 [facility]anch

 

             Systolic blood 2023 18:21:00 114 mm[Hg]                [facility]

sity of



             pressure                                            [facility]



                                                                 Branch

 

             Diastolic blood 2023 18:21:00 78 mm[Hg]                 [facility]

rsity of



             pressure                                            [facility]



                                                                 [facility]anch

 

             Heart rate   2023 18:21:00 103 /min                  [facility]

[facility] of



                                                                 [facility]



                                                                 [facility]anch

 

             Body weight  2023 18:21:00 117.935 kg   per patient  [facility]

[facility] of



                                                                 [facility]



                                                                 [facility]anch

 

             BMI          2023 18:21:00 44.63 kg/m2               [facility]

[facility] of



                                                                 [facility]



                                                                 [facility]

 

             Systolic blood 2023-04-10 02:00:00 148 mm[Hg]                [facility]

sity of



             pressure                                            [facility]



                                                                 [facility]

 

             Diastolic blood 2023-04-10 02:00:00 87 mm[Hg]                 [facility]

rsity of



             pressure                                            [facility]



                                                                 [facility]

 

             Heart rate   2023-04-10 02:00:00 92 /min                   [facility]

[facility] of



                                                                 [facility]



                                                                 [facility]

 

             Respiratory rate 2023-04-10 02:00:00 18 /min                   [facility]

ersity of



                                                                 [facility]



                                                                 [facility]

 

             Oxygen saturation 2023-04-10 02:00:00 99 /min                   Uni

versity of



             in Arterial blood                                        [facility]

[facility]



             by Pulse oximetry                                        Branch

 

             Body temperature 2023 22:01:00 36.72 Tiffanie                 Univ

ersity of



                                                                 [facility]



                                                                 [facility]anch

 

             Body weight  2023 22:01:00 118.389 kg                [facility]

[facility] of



                                                                 [facility]



                                                                 [facility]anch

 

             BMI          2023 22:01:00 44.80 kg/m2               [facility]

ty of



                                                                 [facility]



                                                                 [facility]anch

 

             Systolic blood 2023 01:01:00 140 mm[Hg]                [facility]

sity of



             pressure                                            [facility]



                                                                 [facility]anch

 

             Diastolic blood 2023 01:01:00 86 mm[Hg]                 [facility]

rsity of



             pressure                                            [facility]



                                                                 [facility]anch

 

             Heart rate   2023 01:01:00 103 /min                  [facility]

[facility] of



                                                                 [facility]



                                                                 [facility]

 

             Respiratory rate 2023 01:01:00 19 /min                   [facility]

ersity of



                                                                 [facility]



                                                                 [facility]

 

             Oxygen saturation 2023 01:01:00 98 /min                   [facility]i

versity of



             in Arterial blood                                        [facility]

[facility]



             by Pulse oximetry                                        Branch

 

             Body temperature 2023 20:37:00 36.78 Tiffanie                 Univ

ersity of



                                                                 [facility]



                                                                 [facility]

 

             Body height  2023 20:37:00 162.6 cm                  [facility]

[facility]



                                                                 [facility]



                                                                 [facility]

 

             Body weight  2023 20:37:00 118.389 kg                [facility]

[facility] of



                                                                 [facility]



                                                                 [facility]

 

             BMI          2023 20:37:00 44.80 kg/m2               [facility]

ty of



                                                                 [facility]



                                                                 [facility]

 

             Systolic blood 2023 18:18:00 98 mm[Hg]                 [facility]

sity of



             pressure                                            [facility]



                                                                 [facility]

 

             Diastolic blood 2023 18:18:00 52 mm[Hg]                 [facility]

rsity of



             pressure                                            [facility]



                                                                 [facility]

 

             Heart rate   2023 18:18:00 93 /min                   [facility]

[facility] of



                                                                 [facility]



                                                                 [facility]

 

             Body temperature 2023 18:18:00 36.72 Tiffanie                 Univ

ersity of



                                                                 [facility]



                                                                 [facility]anch

 

             Respiratory rate 2023 18:18:00 16 /min                   [facility]

ersity of



                                                                 [facility]



                                                                 [facility]

 

             Oxygen saturation 2023 18:18:00 93 /min                   [facility]i

versity of



             in Arterial blood                                        [facility]

[facility]



             by Pulse oximetry                                        Branch

 

             Body height  2023 09:34:00 149.9 cm                  [facility]

[facility]



                                                                 [facility]



                                                                 [facility]

 

             Body weight  2023 09:34:00 118.389 kg                [facility]

[facility] of



                                                                 [facility]



                                                                 [facility]anch

 

             BMI          2023 09:34:00 52.72 kg/m2               [facility]

ty of



                                                                 [facility]



                                                                 [facility]

 

             Systolic blood 2023 21:30:00 129 mm[Hg]                [facility]

sity of



             pressure                                            [facility]



                                                                 [facility]

 

             Diastolic blood 2023 21:30:00 73 mm[Hg]                 [facility]

rsity of



             pressure                                            [facility]



                                                                 [facility]

 

             Heart rate   2023 21:30:00 96 /min                   [facility]

[facility]



                                                                 [facility]



                                                                 [facility]

 

             Respiratory rate 2023 21:30:00 14 /min                   [facility]iv

ersity of



                                                                 [facility]



                                                                 [facility]

 

             Oxygen saturation 2023 21:30:00 95 /min                   [facility]

[facility]



             in Arterial blood                                        [facility]

[facility]



             by Pulse oximetry                                        [facility]

 

             Body temperature 2023 20:53:00 36.56 Tiffanie                 [facility]

[facility] of



                                                                 [facility]



                                                                 [facility]

 

             Body height  2023 09:34:00 149.9 cm                  [facility]

[facility]



                                                                 [facility]



                                                                 [facility]

 

             Body weight  2023 09:34:00 118.389 kg                [facility]

[facility]



                                                                 [facility]



                                                                 [facility]

 

             BMI          2023 09:34:00 52.72 kg/m2               [facility]

[facility] of



                                                                 [facility]



                                                                 [facility]

 

             Systolic blood 2023 22:09:00 139 mm[Hg]                [facility]

[facility]ity of



             pressure                                            [facility]



                                                                 [facility]

 

             Diastolic blood 2023 22:09:00 104 mm[Hg]                [facility]

rsity of



             pressure                                            [facility]



                                                                 [facility]

 

             Heart rate   2023 22:09:00 93 /min                   [facility]

[facility]



                                                                 [facility]



                                                                 [facility]

 

             Respiratory rate 2023 22:09:00 18 /min                   [facility]

ers[facility] of



                                                                 [facility]



                                                                 [facility]

 

             Oxygen saturation 2023 22:09:00 96 /min                   [facility]

[facility]



             in Arterial blood                                        [facility]

[facility]



             by Pulse oximetry                                        [facility]

 

             Body temperature 2023 19:53:00 36.67 Tiffanie                 [facility]

ersity of



                                                                 [facility]



                                                                 [facility]

 

             Systolic blood 2023 03:00:00 132 mm[Hg]                [facility]

sity of



             pressure                                            [facility]



                                                                 [facility]

 

             Diastolic blood 2023 03:00:00 89 mm[Hg]                 [facility]

rsity of



             pressure                                            [facility]



                                                                 [facility]

 

             Heart rate   2023 03:00:00 91 /min                   [facility]

[facility] of



                                                                 [facility]



                                                                 [facility]

 

             Respiratory rate 2023 03:00:00 18 /min                   [facility]

ers[facility] of



                                                                 [facility]



                                                                 [facility]

 

             Oxygen saturation 2023 03:00:00 96 /min                   [facility]

[facility]



             in Arterial blood                                        [facility]

[facility]



             by Pulse oximetry                                        [facility]

 

             Body temperature 2023-02-10 22:12:00 37.06 Tiffanie                 [facility]

ersity of



                                                                 [facility]



                                                                 [facility]

 

             Body weight  2023-02-10 22:12:00 131.543 kg                [facility]

[facility] of



                                                                 [facility]



                                                                 [place]

 

             BMI          2023-02-10 22:12:00 58.57 kg/m2               [facility]

ty of



                                                                 [facility]



                                                                 [facility]

 

             Systolic blood 2023-02-10 00:30:00 115 mm[Hg]                [facility]

sity of



             pressure                                            [facility]



                                                                 [facility]

 

             Diastolic blood 2023-02-10 00:30:00 78 mm[Hg]                 [facility]

rsity of



             pressure                                            [facility]



                                                                 [facility]

 

             Heart rate   2023-02-10 00:30:00 96 /min                   [facility]

[facility] of



                                                                 [facility]



                                                                 [facility]

 

             Respiratory rate 2023-02-10 00:30:00 18 /min                   [facility]

ersity of



                                                                 [facility]



                                                                 [facility]

 

             Oxygen saturation 2023-02-10 00:30:00 95 /min                   [facility]

[facility]



             in Arterial blood                                        [facility]

[facility]



             by Pulse oximetry                                        [facility]

 

             Body temperature 2023 21:14:00 35.89 Tiffanie                 [facility]

ersity of



                                                                 [facility]



                                                                 [facility]

 

             Body height  2023 21:14:00 149.9 cm                  [facility]

[facility] of



                                                                 [facility]



                                                                 [facility]

 

             Body weight  2023 21:14:00 127.461 kg                [facility]

[facility] of



                                                                 [facility]



                                                                 [facility]

 

             BMI          2023 21:14:00 56.76 kg/m2               [facility]

[facility] of



                                                                 [facility]



                                                                 [facility]

 

             Systolic blood 2023 01:13:00 119 mm[Hg]                [facility]

sity of



             pressure                                            [facility]



                                                                 [facility]

 

             Diastolic blood 2023 01:13:00 85 mm[Hg]                 [facility]

rsity of



             pressure                                            [facility]



                                                                 [facility]

 

             Heart rate   2023 01:13:00 97 /min                   [facility]

[facility] of



                                                                 [facility]



                                                                 [facility]

 

             Body temperature 2023 01:13:00 36.17 Tiffanie                 [facility]

ersity of



                                                                 [facility]



                                                                 [facility]

 

             Respiratory rate 2023 01:13:00 16 /min                   [facility]

ers[facility] of



                                                                 [facility]



                                                                 [facility]

 

             Oxygen saturation 2023 01:13:00 94 /min                   [facility]

[facility]



             in Arterial blood                                        [facility]

[facility]



             by Pulse oximetry                                        [facility]

 

             Body weight  2023 09:31:00 126.962 kg                [facility]

[facility] of



                                                                 [facility]



                                                                 [facility]

 

             BMI          2023 09:31:00 56.53 kg/m2               [facility]

[facility] of



                                                                 [facility]



                                                                 [facility]

 

             Systolic blood 2022 18:59:00 125 mm[Hg]                [facility]

[facility]ity of



             pressure                                            [facility]



                                                                 [facility]

 

             Diastolic blood 2022 18:59:00 84 mm[Hg]                 [facility]

rsity of



             pressure                                            [facility]



                                                                 [facility]

 

             Heart rate   2022 18:59:00 104 /min                  [facility]

[facility] of



                                                                 [facility]



                                                                 [facility]

 

             Respiratory rate 2022 18:59:00 18 /min                   [facility]

ers[facility] of



                                                                 [facility]



                                                                 [facility]

 

             Body weight  2022 18:59:00 127.007 kg                [facility]

[facility]



                                                                 [facility]



                                                                 [facility]

 

             BMI          2022 18:59:00 56.55 kg/m2               [facility]

[facility] of



                                                                 [facility]



                                                                 [facility]

 

             Oxygen saturation 2022 18:59:00 98 /min                   [facility]

[facility]



             in Arterial blood                                        [facility]

[facility]



             by Pulse oximetry                                        [facility]

 

             Systolic blood 2022 19:02:00 142 mm[Hg]                [facility]

[facility]ity of



             pressure                                            [facility]



                                                                 [facility]

 

             Diastolic blood 2022 19:02:00 99 mm[Hg]                 [facility]

rsity of



             pressure                                            [facility]



                                                                 [facility]

 

             Heart rate   2022 19:01:00 91 /min                   [facility]

[facility] of



                                                                 [facility]



                                                                 [facility]

 

             Systolic blood 2022 18:00:00 136 mm[Hg]                [facility]

sity of



             pressure                                            [facility]



                                                                 [facility]

 

             Diastolic blood 2022 18:00:00 92 mm[Hg]                 [facility]

rsity of



             pressure                                            [facility]



                                                                 [facility]

 

             Heart rate   2022 18:00:00 99 /min                   [facility]

[facility] of



                                                                 [facility]



                                                                 [facility]

 

             Body temperature 2022 18:00:00 35.83 Tiffanie                 [facility]

ers[facility] of



                                                                 [facility]



                                                                 [facility]

 

             Respiratory rate 2022 18:00:00 18 /min                   [facility]

ersity of



                                                                 [facility]



                                                                 [facility]

 

             Oxygen saturation 2022 18:00:00 95 /min                   [facility]

[facility] of



             in Arterial blood                                        [facility]

[facility]



             by Pulse oximetry                                        [facility]

 

             Body weight  2022 10:45:00 135.988 kg                [facility]

[facility] of



                                                                 [facility]



                                                                 [facility]

 

             BMI          2022 10:45:00 60.55 kg/m2               [facility]

[facility] of



                                                                 [facility]



                                                                 [facility]

 

             Body height  2022 02:02:00 149.9 cm                  [facility]

[facility] of



                                                                 [facility]



                                                                 [facility]

 

             Systolic blood 2022 01:11:00 166 mm[Hg]                [facility]

sity of



             pressure                                            [facility]



                                                                 [facility]

 

             Diastolic blood 2022 01:11:00 93 mm[Hg]                 [facility]

rsity of



             pressure                                            [facility]



                                                                 [facility]

 

             Heart rate   2022 01:09:00 106 /min                  [facility]

[facility] of



                                                                 [facility]



                                                                 [facility]

 

             Body temperature 2022 01:09:00 36.89 Tiffanie                 [facility]

ers[facility] of



                                                                 [facility]



                                                                 [facility]

 

             Respiratory rate 2022 01:09:00 20 /min                   [facility]

ersity of



                                                                 [facility]



                                                                 [facility]

 

             Body weight  2022 01:09:00 127.007 kg                [facility]

[facility] of



                                                                 [facility]



                                                                 [facility]

 

             BMI          2022 01:09:00 56.55 kg/m2               [facility]

[facility] of



                                                                 [facility]



                                                                 [facility]

 

             Oxygen saturation 2022 01:09:00 96 /min                   [facility]

[facility] of



             in Arterial blood                                        [facility]

[facility]



             by Pulse oximetry                                        [facility]

 

             Systolic blood 2022-10-30 03:00:00 138 mm[Hg]                [facility]

sity of



             pressure                                            [facility]



                                                                 [facility]

 

             Diastolic blood 2022-10-30 03:00:00 82 mm[Hg]                 [facility]

rsity of



             pressure                                            [facility]



                                                                 [facility]

 

             Heart rate   2022-10-30 03:00:00 102 /min                  [facility]

[facility] of



                                                                 [facility]



                                                                 [facility]

 

             Respiratory rate 2022-10-30 03:00:00 20 /min                   [facility]

ersity of



                                                                 [facility]



                                                                 [facility]

 

             Oxygen saturation 2022-10-30 03:00:00 97 /min                   [facility]

[facility] of



             in Arterial blood                                        [facility]

[facility]



             by Pulse oximetry                                        [facility]

 

             Body temperature 2022-10-30 00:13:00 36.5 Tiffanie                  [facility]

ersity of



                                                                 [facility]



                                                                 [facility]

 

             Body weight  2022-10-30 00:13:00 132.904 kg                [facility]

[facility] of



                                                                 [facility]



                                                                 [facility]

 

             BMI          2022-10-30 00:13:00 59.18 kg/m2               [facility]

ty of



                                                                 [facility]



                                                                 [facility]

 

             Systolic blood 2022-10-23 12:42:00 128 mm[Hg]                [facility]

sity of



             pressure                                            [facility]



                                                                 [facility]

 

             Diastolic blood 2022-10-23 12:42:00 83 mm[Hg]                 [facility]

rsity of



             pressure                                            [facility]



                                                                 [facility]

 

             Heart rate   2022-10-23 12:42:00 111 /min                  [facility]

[facility] of



                                                                 [facility]



                                                                 [facility]

 

             Body temperature 2022-10-23 12:42:00 35.94 Tiffanie                 [facility]

ersity of



                                                                 [facility]



                                                                 [facility]

 

             Respiratory rate 2022-10-23 12:42:00 18 /min                   [facility]

ersity of



                                                                 [facility]



                                                                 [facility]

 

             Oxygen saturation 2022-10-23 12:42:00 95 /min                   [facility]

versity of



             in Arterial blood                                        [facility]

[facility]



             by Pulse oximetry                                        [facility]

 

             Body weight  2022-10-23 10:22:00 132.995 kg                [facility]

[facility] of



                                                                 [facility]



                                                                 [facility]

 

             BMI          2022-10-23 10:22:00 59.22 kg/m2               [facility]

ty of



                                                                 [facility]



                                                                 [facility]

 

             Body height  2022-10-21 11:00:00 149.9 cm                  [facility]

[facility] of



                                                                 [facility]



                                                                 [facility]

 

             Systolic blood 2022 17:14:00 130 mm[Hg]                [facility]

sity of



             pressure                                            [facility]



                                                                 [facility]

 

             Diastolic blood 2022 17:14:00 83 mm[Hg]                 [facility]

rsity of



             pressure                                            [facility]



                                                                 [facility]

 

             Heart rate   2022 17:14:00 100 /min                  [facility]

[facility] of



                                                                 [facility]



                                                                 [facility]

 

             Body temperature 2022 17:14:00 36.72 Tiffanie                 [facility]

ersity of



                                                                 [facility]



                                                                 [facility]

 

             Respiratory rate 2022 17:14:00 20 /min                   [facility]

ersity of



                                                                 [facility]



                                                                 [facility]

 

             Oxygen saturation 2022 17:14:00 96 /min                   [facility]

versity of



             in Arterial blood                                        [facility]

[facility]



             by Pulse oximetry                                        [facility]

 

             Body height  2022 10:00:00 149.9 cm                  [facility]

[facility] of



                                                                 [facility]



                                                                 [facility]

 

             Body weight  2022 10:00:00 123 kg                    [facility]

[facility] of



                                                                 [facility]



                                                                 [facility]

 

             BMI          2022 10:00:00 54.77 kg/m2               [facility]

[facility] of



                                                                 [facility]



                                                                 [facility]

 

             Systolic blood 2022 12:40:00 107 mm[Hg]                [facility]

sity of



             pressure                                            [facility]



                                                                 [facility]

 

             Diastolic blood 2022 12:40:00 64 mm[Hg]                 [facility]

rsity of



             pressure                                            [facility]



                                                                 [facility]

 

             Heart rate   2022 12:40:00 99 /min                   [facility]

[facility] of



                                                                 [facility]



                                                                 [facility]

 

             Body temperature 2022 12:40:00 36.83 Tiffanie                 [facility]

ersity of



                                                                 [facility]



                                                                 [facility]

 

             Respiratory rate 2022 12:40:00 18 /min                   [facility]

ersity of



                                                                 [facility]



                                                                 [facility]

 

             Oxygen saturation 2022 12:40:00 95 /min                   [facility]

versity of



             in Arterial blood                                        [facility]

[facility]



             by Pulse oximetry                                        [facility]

 

             Body height  2022 10:00:00 149.9 cm                  [facility]

[facility] of



                                                                 [facility]



                                                                 [facility]

 

             Body weight  2022 10:00:00 123 kg                    [facility]

[facility] of



                                                                 [facility]



                                                                 [facility]

 

             BMI          2022 10:00:00 54.77 kg/m2               [facility]

[facility]



                                                                 [facility] [facility]



                                                                 [facility]

 

             Heart rate   2022 23:00:00 91 /min                   [facility]

[facility]



                                                                 [facility]



                                                                 [facility]

 

             Oxygen saturation 2022 23:00:00 95 /min                   [facility]

[facility]



             in Arterial blood                                        [facility]

[facility]



             by Pulse oximetry                                        Branch

 

             Systolic blood 2022 22:02:00 134 mm[Hg]                Univer

sity of



             pressure                                            [facility]



                                                                 [facility]anch

 

             Diastolic blood 2022 22:02:00 83 mm[Hg]                 Unive

rsity of



             pressure                                            [facility]



                                                                 [facility]anch

 

             Body temperature 2022 21:00:00 36.83 Tiffanie                 [facility]

ersity of



                                                                 [facility]



                                                                 [facility]

 

             Respiratory rate 2022 21:00:00 28 /min                   [facility]

[facility] of



                                                                 [facility]



                                                                 [facility]

 

             Body weight  2022 09:00:00 134.99 kg                 [facility]

[facility]



                                                                 [facility]



                                                                 [facility]

 

             BMI          2022 09:00:00 60.08 kg/m2               [facility]

[facility]



                                                                 [facility]



                                                                 [facility]

 

             Body height  2022 22:22:00 149.9 cm                  [facility]

[facility] of



                                                                 [facility]



                                                                 [facility]

 

             Systolic blood 2022 03:38:00 126 mm[Hg]                [facility]iver

sity of



             pressure                                            [facility]



                                                                 [facility]

 

             Diastolic blood 2022 03:38:00 90 mm[Hg]                 [facility]

rsity of



             pressure                                            [facility]



                                                                 [facility]anch

 

             Heart rate   2022 03:38:00 97 /min                   [facility]

[facility] of



                                                                 [facility]



                                                                 [facility]

 

             Respiratory rate 2022 03:38:00 18 /min                   [facility]

ersity of



                                                                 [facility]



                                                                 [facility]

 

             Oxygen saturation 2022 03:38:00 97 /min                   [facility]

[facility]



             in Arterial blood                                        [facility]

[facility]



             by Pulse oximetry                                        Branch

 

             Body temperature 2022-07-15 22:09:00 36.94 Tiffanie                 [facility]

ersity of



                                                                 [facility]



                                                                 [facility]

 

             Body height  2022-07-15 22:09:00 149.9 cm                  [facility]

[facility]



                                                                 [facility]



                                                                 [facility]

 

             Body weight  2022-07-15 22:09:00 127.007 kg                [facility]

[facility] of



                                                                 [facility]



                                                                 [facility]

 

             BMI          2022-07-15 22:09:00 56.55 kg/m2               [facility]

[facility] of



                                                                 [facility]



                                                                 [facility]

 

             Systolic blood 2022 11:00:00 143 mm[Hg]                [facility]

sity of



             pressure                                            [facility]



                                                                 [facility]

 

             Diastolic blood 2022 11:00:00 91 mm[Hg]                 [facility]

rsity of



             pressure                                            [facility]



                                                                 [facility]

 

             Heart rate   2022 11:00:00 100 /min                  [facility]

[facility]



                                                                 [facility]



                                                                 [facility]

 

             Respiratory rate 2022 11:00:00 16 /min                   [facility]

ers[facility] of



                                                                 [facility]



                                                                 [facility]

 

             Oxygen saturation 2022 11:00:00 96 /min                   [facility]

[facility] of



             in Arterial blood                                        [facility]

[facility]



             by Pulse oximetry                                        Branch

 

             Body temperature 2022 09:55:00 36.89 Tiffanie                 [facility]

ersity of



                                                                 [facility]



                                                                 [facility]

 

             Body height  2022 09:55:00 149.9 cm                  [facility]

[facility] of



                                                                 [facility]



                                                                 [facility]

 

             Body weight  2022 09:55:00 127.007 kg                [facility]

[facility]



                                                                 [facility]



                                                                 [facility]

 

             BMI          2022 09:55:00 56.55 kg/m2               [facility]

[facility] of



                                                                 [facility]



                                                                 [facility]

 

             Systolic blood 2022 00:43:00 132 mm[Hg]                [facility]

sity of



             pressure                                            [facility]



                                                                 [facility]

 

             Diastolic blood 2022 00:43:00 88 mm[Hg]                 [facility]

rsity of



             pressure                                            [facility]



                                                                 [facility]

 

             Heart rate   2022 00:43:00 107 /min                  [facility]

[facility]



                                                                 [facility]



                                                                 [facility]

 

             Body temperature 2022 00:43:00 36.33 Tiffanie                 [facility]

ersity of



                                                                 [facility]



                                                                 [facility]

 

             Respiratory rate 2022 00:43:00 18 /min                   [facility]

ers[facility] of



                                                                 [facility]



                                                                 [facility]

 

             Oxygen saturation 2022 00:43:00 95 /min                   [facility]

[facility]ity [facility]



             in Arterial blood                                        [facility]

[facility]



             by Pulse oximetry                                        [facility]anch

 

             Body height  2022 11:31:00 149.9 cm                  [facility]

[facility]



                                                                 [facility]



                                                                 [facility]

 

             Body weight  2022 11:31:00 127.007 kg                [facility]

[facility] of



                                                                 [facility]



                                                                 [facility]

 

             BMI          2022 11:31:00 56.55 kg/m2               [facility]

[facility] of



                                                                 [facility]



                                                                 [facility]

 

             Body temperature 2022 16:10:00 36.22 Tiffanie                 [facility]

ersity of



                                                                 [facility]



                                                                 [facility]

 

             Respiratory rate 2022 16:10:00 16 /min                   [facility]

ersity of



                                                                 [facility]



                                                                 [facility]

 

             Oxygen saturation 2022 16:10:00 96 /min                   [facility]i

[facility]ity of



             in Arterial blood                                        [facility]

[facility]



             by Pulse oximetry                                        Branch

 

             Systolic blood 2022 16:10:00 127 mm[Hg]                [facility]

sity of



             pressure                                            [facility]



                                                                 [facility]

 

             Diastolic blood 2022 16:10:00 84 mm[Hg]                 [facility]

rsity of



             pressure                                            [facility]



                                                                 [facility]

 

             Heart rate   2022 16:10:00 98 /min                   [facility]

[facility]



                                                                 [facility]



                                                                 [facility]

 

             Body weight  2022 08:54:00 133.494 kg                [facility]

[facility] of



                                                                 [facility]



                                                                 [facility]

 

             BMI          2022 08:54:00 50.52 kg/m2               [facility]

[facility] of



                                                                 [facility]



                                                                 [facility]

 

             Body height  2022 03:01:00 162.6 cm                  [facility]

[facility] of



                                                                 [facility]



                                                                 [facility]

 

             Systolic blood 2022-06-10 17:03:00 132 mm[Hg]                [facility]

sity of



             pressure                                            [facility]



                                                                 [facility]

 

             Diastolic blood 2022-06-10 17:03:00 90 mm[Hg]                 [facility]ive

rsity of



             pressure                                            [facility]



                                                                 [facility]

 

             Heart rate   2022-06-10 17:03:00 78 /min                   [facility]

[facility] of



                                                                 [facility]



                                                                 [facility]

 

             Body temperature 2022-06-10 17:03:00 35.83 Tiffanie                 [facility]

ersity of



                                                                 [facility]



                                                                 [facility]

 

             Respiratory rate 2022-06-10 17:03:00 14 /min                   [facility]iv

ersity of



                                                                 [facility]



                                                                 [facility]

 

             Oxygen saturation 2022-06-10 17:03:00 93 /min                   [facility]i

versity of



             in Arterial blood                                        [facility]

[facility]



             by Pulse oximetry                                        Branch

 

             Body weight  2022-06-10 09:00:00 140.933 kg                [facility]

ty of



                                                                 [facility]



                                                                 Branch

 

             BMI          2022-06-10 09:00:00 62.75 kg/m2               Universi

ty of



                                                                 [facility]

 

             Body height  2022 12:47:00 149.9 cm                  [facility]

ty of



                                                                 [facility]



                                                                 Branch

 

             Systolic blood 2022 20:40:00 125 mm[Hg]                [facility]iver

sity of



             pressure                                            [facility]



                                                                 [facility]

 

             Diastolic blood 2022 20:40:00 75 mm[Hg]                 [facility]ive

rsity of



             pressure                                            [facility]



                                                                 [facility]

 

             Heart rate   2022 20:40:00 99 /min                   [facility]

ty of



                                                                 [facility]

 

             Body temperature 2022 20:40:00 36.67 Tiffanie                 Univ

ersity of



                                                                 [facility]



                                                                 Branch

 

             Respiratory rate 2022 20:40:00 20 /min                   [facility]iv

ersity of



                                                                 [facility]



                                                                 [facility]

 

             Oxygen saturation 2022 20:40:00 93 /min                   [facility]i

versity of



             in Arterial blood                                        [facility]

[facility]



             by Pulse oximetry                                        Branch

 

             Body weight  2022 22:13:00 137.485 kg                [facility]

[facility] of



                                                                 [facility]



                                                                 [facility]

 

             BMI          2022 22:13:00 61.22 kg/m2               [facility]

ty of



                                                                 [facility]



                                                                 [place]

 

             Body height  2022 09:10:00 149.9 cm                  [facility]

ty of



                                                                 [facility]



                                                                 Branch

 

             Systolic blood 2022 15:49:00 111 mm[Hg]                Univer

sity of



             pressure                                            [facility]



                                                                 [facility]

 

             Diastolic blood 2022 15:49:00 76 mm[Hg]                 Unive

rsity of



             pressure                                            [facility]



                                                                 [facility]

 

             Heart rate   2022 15:49:00 109 /min                  [facility]

ty of



                                                                 [facility]



                                                                 [facility]anch

 

             Body temperature 2022 15:49:00 36.06 Tiffanie                 Univ

ersity of



                                                                 [facility]



                                                                 [facility]

 

             Respiratory rate 2022 15:49:00 18 /min                   [facility]iv

ersity of



                                                                 [facility]



                                                                 [facility]

 

             Oxygen saturation 2022 15:49:00 92 /min                   Uni

versity of



             in Arterial blood                                        [facility]

[facility]



             by Pulse oximetry                                        Branch

 

             Body weight  2022 10:47:00 139.481 kg                [facility]

[facility] of



                                                                 [facility]



                                                                 [facility]

 

             BMI          2022 10:47:00 62.11 kg/m2               [facility]

ty of



                                                                 [facility]



                                                                 [facility]

 

             Body height  2022 06:57:00 149.9 cm                  [facility]

ty of



                                                                 [facility]



                                                                 [facility]

 

             Systolic blood 2022 06:00:00 144 mm[Hg]                Univer

sity of



             pressure                                            [facility]



                                                                 [facility]

 

             Diastolic blood 2022 06:00:00 93 mm[Hg]                 Unive

rsity of



             pressure                                            [facility]



                                                                 [facility]

 

             Heart rate   2022 06:00:00 92 /min                   [facility]

[facility] of



                                                                 [facility]



                                                                 [facility]

 

             Respiratory rate 2022 06:00:00 22 /min                   [facility]

ersity of



                                                                 [facility]



                                                                 [facility]

 

             Oxygen saturation 2022 04:00:00 94 /min                   [facility]i

versity of



             in Arterial blood                                        [facility]

[facility]



             by Pulse oximetry                                        Branch

 

             Body temperature 2022 03:45:00 37.06 Tiffanie                 Univ

ersity of



                                                                 [facility]



                                                                 [facility]

 

             Body height  2022 03:45:00 149.9 cm                  [facility]

[facility] of



                                                                 [facility]



                                                                 [facility]

 

             Body weight  2022 03:45:00 127.461 kg                [facility]

[facility] of



                                                                 [facility]



                                                                 [facility]

 

             BMI          2022 03:45:00 56.76 kg/m2               [facility]

ty of



                                                                 [facility]



                                                                 [facility]

 

             Systolic blood 2022 03:18:00 113 mm[Hg]                Univer

sity of



             pressure                                            [facility]



                                                                 [facility]

 

             Diastolic blood 2022 03:18:00 85 mm[Hg]                 Unive

rsity of



             pressure                                            [facility]



                                                                 [facility]

 

             Heart rate   2022 03:18:00 96 /min                   [facility]

[facility] of



                                                                 [facility]



                                                                 [facility]

 

             Respiratory rate 2022 03:18:00 18 /min                   [facility]iv

ersity of



                                                                 [facility]



                                                                 [facility]

 

             Oxygen saturation 2022 03:18:00 93 /min                   [facility]i

versity of



             in Arterial blood                                        [facility]

[facility]



             by Pulse oximetry                                        Branch

 

             Body temperature 2022 01:01:16 36.89 Tiffanie                 Univ

ersity of



                                                                 [facility]



                                                                 [facility]

 

             Body weight  2022 23:13:00 127.461 kg                [facility]

[facility] of



                                                                 [facility]



                                                                 [facility]

 

             BMI          2022 23:13:00 56.76 kg/m2               [facility]

[facility] of



                                                                 [facility]



                                                                 [facility]

 

             Systolic blood 2022 21:53:00 142 mm[Hg]                Univer

sity of



             pressure                                            [facility]



                                                                 [facility]

 

             Diastolic blood 2022 21:53:00 88 mm[Hg]                 Unive

rsity of



             pressure                                            [facility]



                                                                 [facility]

 

             Heart rate   2022 21:53:00 96 /min                   [facility]

ty of



                                                                 [facility]

 

             Body temperature 2022 21:53:00 36.06 Tiffanie                 Univ

ersity of



                                                                 [facility]



                                                                 [facility]

 

             Respiratory rate 2022 21:53:00 18 /min                   [facility]iv

ersity of



                                                                 [facility]



                                                                 [facility]

 

             Oxygen saturation 2022 21:53:00 96 /min                   Uni

versity of



             in Arterial blood                                        [facility]

[facility]



             by Pulse oximetry                                        Branch

 

             Body weight  2022 09:48:00 138.801 kg                [facility]

ty of



                                                                 [facility]

 

             BMI          2022 09:48:00 61.80 kg/m2               Universi

ty of



                                                                 [facility]

 

             Body height  2022 10:27:00 149.9 cm                  [facility]

ty of



                                                                 [facility]



                                                                 [facility]

 

             Systolic blood 2022 03:50:00 142 mm[Hg]                [facility]

sity of



             pressure                                            [facility]



                                                                 [facility]

 

             Diastolic blood 2022 03:50:00 95 mm[Hg]                 Unive

rsity of



             pressure                                            [facility]



                                                                 [facility]

 

             Heart rate   2022 03:50:00 93 /min                   Universi

ty of



                                                                 [facility]



                                                                 [facility]

 

             Respiratory rate 2022 03:50:00 17 /min                   Univ

ersity of



                                                                 [facility]



                                                                 [facility]

 

             Oxygen saturation 2022 03:50:00 98 /min                   Uni

versity of



             in Arterial blood                                        [facility]

sar[facility]



             by Pulse oximetry                                        [facility]

 

             Body temperature 2022 20:39:00 36.5 Tiffanie                  Univ

ersity of



                                                                 [facility]



                                                                 [facility]

 

             Body weight  2022 20:39:00 136.079 kg                Universi

ty of



                                                                 [facility]



                                                                 [facility]

 

             BMI          2022 20:39:00 60.59 kg/m2               Universi

ty of



                                                                 [facility]



                                                                 Branch

 

             Systolic blood 2022 01:46:00 134 mm[Hg]                Univer

sity of



             pressure                                            [facility]



                                                                 Br[facility]

 

             Diastolic blood 2022 01:46:00 100 mm[Hg]                Unive

rsity of



             pressure                                            [facility]



                                                                 Br[facility]

 

             Heart rate   2022 01:46:00 102 /min                  [facility]

 

             Respiratory rate 2022 01:46:00 22 /min                   Univ

ersity [facility]

 

             Oxygen saturation 2022 01:46:00 96 /min                   Uni

versity of



             in Arterial blood                                        [facility]

sar[facility]



             by Pulse oximetry                                        [place]

 

             Body temperature 2022-01-15 20:52:00 36.67 Tiffanie                 [facility]

 

             Body weight  2022-01-15 20:52:00 136.079 kg                [facility]

 

             BMI          2022-01-15 20:52:00 60.59 kg/m2               [facility]

 

             height       2021 11:20:00 59 [in_i]                 [facility]

 

             weight       2021 11:20:00 350 [lb_av]               [facility]

 

             temperature  2021 11:20:00 97.8 [degF]               [facility]

 

             bmi          2021 11:20:00 70.68 kg/m2               [facility]

 

             oximetry     2021 11:20:00 94 %                      [facility]

 

             blood pressure 2021 11:20:00 160 mm[Hg]                [facility]



             systolic                                            [facility]

 

             blood pressure 2021 11:20:00 80 mm[Hg]                 [facility]



             diastolic                                           [facility]

 

             Systolic blood 2021 23:30:00 175 mm[Hg]                Univer

sity of



             [facility]

 

             Diastolic blood 2021 23:30:00 107 mm[Hg]                Unive

rsity of



             [facility]

 

             Heart rate   2021 23:30:00 81 /min                   [facility]

 

             Respiratory rate 2021 23:30:00 21 /min                   [facility]

 

             Oxygen saturation 2021 23:30:00 97 /min                   Uni

versity of



             in Arterial blood                                        [facility]

[facility]



             by Pulse oximetry                                        [facility]

 

             Body weight  2021 21:55:00 136.079 kg                [facility]

 

             BMI          2021 21:55:00 60.59 kg/m2               [facility]

 

             Body temperature 2021 21:55:00 37.44 Tiffanie                 [facility]

[facility]

 

             height       2021 13:00:00 59 [in_i]                 [facility]

[facility]

 

             weight       2021 13:00:00 350 [lb_av]               [facility]

[facility]

 

             temperature  2021 13:00:00 96.8 [degF]               [facility]

[facility]

 

             bmi          2021 13:00:00 70.68 kg/m2               [facility]

[facility]

 

             oximetry     2021 13:00:00 96 %                      [facility]

[facility]

 

             blood pressure 2021 13:00:00 120 mm[Hg]                [facility]



             systolic                                            [facility]

 

             blood pressure 2021 13:00:00 77 mm[Hg]                 [facility]



             diastolic                                           [facility]

 

             height       2021 14:20:00 59 [in_i]                 [facility]

[facility]

 

             weight       2021 14:20:00 350 [lb_av]               [facility]

[facility]

 

             temperature  2021 14:20:00 95 [degF]                 [facility]

[facility]

 

             bmi          2021 14:20:00 70.68 kg/m2               [facility]

[facility]

 

             oximetry     2021 14:20:00 98 %                      [facility]

[facility] -



                                                                 [facility]

 

             blood pressure 2021 14:20:00 128 mm[Hg]                [facility]



             systolic                                            [facility]

 

             blood pressure 2021 14:20:00 82 mm[Hg]                 [facility]



             diastolic                                           [facility]

 

             height       2021 13:40:00 59 [in_i]                 [facility]

[facility]

 

             weight       2021 13:40:00 350 [lb_av]               [facility]

[facility]

 

             temperature  2021 13:40:00 97.4 [degF]               [facility]

[facility] -



                                                                 [facility]

 

             bmi          2021 13:40:00 70.68 kg/m2               [facility]

[facility] -



                                                                 [facility]

 

             oximetry     2021 13:40:00 91 %                      [facility]

[facility] -



                                                                 [facility]

 

             blood pressure 2021 13:40:00 132 mm[Hg]                [facility]



             systolic                                            [facility]

 

             blood pressure 2021 13:40:00 87 mm[Hg]                 [facility]



             diastolic                                           [facility]

 

             Systolic blood 2021-05-10 01:30:00 163 mm[Hg]                Univer

sity of



             [facility]

 

             Diastolic blood 2021-05-10 01:30:00 106 mm[Hg]                Unive

rsity of



             [facility]

 

             Heart rate   2021-05-10 01:30:00 97 /min                   Universi

ty [facility]

 

             Respiratory rate 2021-05-10 01:30:00 21 /min                   Univ

ersity of



                                                                 [facility]

 

             Oxygen saturation 2021-05-10 01:30:00 97 /min                   Uni

versity of



             in Arterial blood                                        [facility]

[facility]



             by Pulse oximetry                                        [facility]

 

             Body temperature 2021 23:27:00 36.83 Tiffanie                 [facility]

 

             Body height  2021 23:27:00 149.9 cm                  [facility]

 

             Body weight  2021 23:27:00 136.079 kg                [facility]

 

             BMI          2021 23:27:00 60.59 kg/m2               [facility]

 

             Systolic blood 2021-05-10 01:30:00 163 mm[Hg]                Univer

sity of



             [facility]

 

             Diastolic blood 2021-05-10 01:30:00 106 mm[Hg]                Unive

rsity of



             [facility]

 

             Heart rate   2021-05-10 01:30:00 97 /min                   Universi

ty [facility]

 

             Respiratory rate 2021-05-10 01:30:00 21 /min                   Univ

ersity of



                                                                 [facility]

 

             Oxygen saturation 2021-05-10 01:30:00 97 /min                   Uni

versity of



             in Arterial blood                                        [facility]

[facility]



             by Pulse oximetry                                        [facility]

 

             Body temperature 2021 23:27:00 36.83 Tiffanie                 [facility]

 

             Body height  2021 23:27:00 149.9 cm                  [facility]

 

             Body weight  2021 23:27:00 136.079 kg                [facility]

 

             BMI          2021 23:27:00 60.59 kg/m2               [facility]

 

             Systolic blood 2022 16:49:00 127 mm[Hg]                Univer

sity of



             pressure                                            [facility]

 

             Diastolic blood 2022 16:49:00 86 mm[Hg]                 Unive

rsity of



             pressure                                            [facility]

 

             Heart rate   2022 16:49:00 101 /min                  [facility]

 

             Body temperature 2022 16:49:00 36.83 Tiffanie                 [facility]

 

             Respiratory rate 2022 16:49:00 20 /min                   [facility]

 

             Oxygen saturation 2022 16:49:00 95 /min                   Uni

versity of



             in Arterial blood                                        [facility]

[facility]



             by Pulse oximetry                                        [facility]

 

             Body height  2022 10:00:00 149.9 cm                  [facility]

 

             Body weight  2022 10:00:00 123 kg                    [facility]

 

             BMI          2022 10:00:00 54.77 kg/m2               [facility]

 

             Temperature Oral 2022 21:12:00 98.0 F                    [facility]

[facility]



             (F)                                                 

 

             Heart Rate   2022 21:12:00                           [facility]

 [place]

 

             Respitory Rate 2022 21:12:00                           [facility]

al [place]

 

             Systolic (mm Hg) 2022 21:12:00                           [facility]

[facility] [place]

 

             Diastolic (mm Hg) 2022 21:12:00                           [facility]

[facility]

 

             Heart Rate   2022 17:09:26                           [facility]

 [facility]

 

             Respitory Rate 2022 17:09:26                           [facility]

al [place]

 

             Temperature Oral 2022 17:09:08 98.2 F                    [facility]

[facility] [place]



             (F)                                                 

 

             Systolic (mm Hg) 2022 17:08:50                           [facility]

[facility]

 

             Diastolic (mm Hg) 2022 17:08:50                           [facility]

[facility] [place]

 

             Heart Rate   2022 17:08:50                           [facility]

 [place]

 

             Respitory Rate 2022 13:07:22                           [facility]

al [facility]

 

             Temperature Oral 2022 13:07:04 97.7 F                    [facility]

[facility]



             (F)                                                 

 

             Systolic (mm Hg) 2022 13:06:57                           [facility]

[facility]

 

             Diastolic (mm Hg) 2022 13:06:57                           [facility]

[facility]

 

             Heart Rate   2022 10:00:21                           [facility]

 [facility]

 

             Respitory Rate 2022 10:00:21                           [facility]

al [place]

 

             Temperature Oral 2022 09:59:28 97.5 F                    [facility]

[facility] [place]



             (F)                                                 

 

             Systolic (mm Hg) 2022 09:58:18                           [facility]

[facility]

 

             Diastolic (mm Hg) 2022 09:58:18                           [facility]

[facility]

 

             Heart Rate   2022 09:58:18                           [facility]

 [place]

 

             Heart Rate   2022 04:58:41                           [facility]

 [place]

 

             Respitory Rate 2022 04:58:41                           [facility]

[facility]

 

             Temperature Oral 2022 04:58:34 97.2 F                    [facility]

[facility] [place]



             (F)                                                 

 

             Systolic (mm Hg) 2022 04:57:48                           [facility]

[facility] [place]

 

             Diastolic (mm Hg) 2022 04:57:48                           Mem

[facility] [place]

 

             Respitory Rate 2022 00:54:28                           [facility]

al [place]

 

             Systolic (mm Hg) 2022 00:54:19                           [facility]

[facility] [place]

 

             Diastolic (mm Hg) 2022 00:54:19                           Mem

[facility] [place]

 

             Temperature Oral 2022 00:53:24 98.1 F                    [facility]

[facility] [place]



             (F)                                                 

 

             Height       2022 23:39:00 149.86 cm                 [facility]

 [facility]

 

             Weight       2022 23:39:00                           [facility]

 [facility]

 

             BMI Calculated 2022 23:39:00                           [facility]

al [place]

 

             Systolic (mm Hg) 2022 14:00:00                           [facility]

[facility]

 

             Diastolic (mm Hg) 2022 14:00:00                           [facility]

[facility]

 

             Respitory Rate 2022 14:00:00                           [facility]

[facility]

 

             Respitory Rate 2022 13:00:00                           [facility]

[facility]

 

             Systolic (mm Hg) 2022 13:00:00                           [facility]

[facility]

 

             Diastolic (mm Hg) 2022 13:00:00                           [facility]

[facility]

 

             Respitory Rate 2022 12:00:00                           [facility]

[facility]

 

             Systolic (mm Hg) 2022 12:00:00                           [facility]

[facility] [place]

 

             Diastolic (mm Hg) 2022 12:00:00                           [facility]

[facility]

 

             Respitory Rate 2022 05:00:00                           [facility]

[facility]

 

             Systolic (mm Hg) 2022 05:00:00                           [facility]

[facility]

 

             Diastolic (mm Hg) 2022 05:00:00                           Mem

[facility] [place]

 

             Respitory Rate 2022 04:00:00                           [facility]

al [facility]

 

             Systolic (mm Hg) 2022 04:00:00                           [facility]

[facility]

 

             Diastolic (mm Hg) 2022 04:00:00                           [facility]

[facility] [place]

 

             Respitory Rate 2022 03:00:00                           [facility]

al [place]

 

             Systolic (mm Hg) 2022 03:00:00                           [facility]

[facility]

 

             Diastolic (mm Hg) 2022 03:00:00                           [facility]

[facility] [place]

 

             Heart Rate   2022 15:55:41                           [facility]

 [facility]

 

             Temperature Oral 2022 15:55:32 98.3 F                    [facility]

[facility]



             (F)                                                 

 

             Heart Rate   2022 15:54:37                           [facility]

 [place]

 

             Heart Rate   2022 12:20:12                           [facility]

 [facility]

 

             Temperature Oral 2022 12:19:51 97.7 F                    [facility]

[facility]



             (F)                                                 

 

             Temperature Oral 2022 09:20:54 97.9 F                    [facility]

[facility]



             (F)                                                 

 

             Height       2022 06:18:00 149.86 cm                 [facility]

 [place]

 

             Weight       2022 06:18:00                           [facility]

 [facility]

 

             BMI Calculated 2022 06:18:00                           [facility]

al [place]

 

             Respitory Rate 2018 17:30:00                           [facility]

al [facility]

 

             Systolic (mm Hg) 2018 17:30:00                           [facility]

[facility]

 

             Diastolic (mm Hg) 2018 17:30:00                           [facility]

[facility]

 

             Temperature Oral 2018 17:30:00 98.4 F                    [facility]

[facility] [place]



             (F)                                                 

 

             Temperature Oral 2018 16:23:00 98.6 F                    [facility]

[facility] [place]



             (F)                                                 

 

             Systolic (mm Hg) 2018 16:23:00                           [facility]

[facility] [place]

 

             Diastolic (mm Hg) 2018 16:23:00                           [facility]

[facility]

 

             Respitory Rate 2018 14:00:00                           [facility]

al [place]

 

             Systolic (mm Hg) 2018 14:00:00                           [facility]

[facility] [place]

 

             Diastolic (mm Hg) 2018 14:00:00                           [facility]

[facility]

 

             Respitory Rate 2018 13:30:00                           [facility]

al [place]

 

             BMI Calculated 2018 10:16:00                           [facility]

[facility]

 

             Height       2018 10:16:00 149.86 cm                 [facility]

 [facility]

 

             Weight       2018 10:16:00                           [facility]

 [place]

 

             Heart Rate   2018 10:16:00                           [facility]

 [place]

 

             Temperature Oral 2018 10:16:00 97.9 F                    [facility]

[facility] [place]



             (F)                                                 

 

             Temperature Oral 2018 13:40:00 97.2 F                    [facility]

[facility] [place]



             (F)                                                 

 

             Systolic (mm Hg) 2018 13:40:00                           [facility]

[facility]

 

             Diastolic (mm Hg) 2018 13:40:00                           Mem

[facility] [place]

 

             Heart Rate   2018 13:40:00                           [facility]

 [facility]

 

             Respitory Rate 2018 13:40:00                           [facility]

al [place]

 

             Heart Rate   2018 05:24:00                           [facility]

 [place]

 

             Systolic (mm Hg) 2018 05:24:00                           [facility]

[facility] [place]

 

             Diastolic (mm Hg) 2018 05:24:00                           Mem

[facility] [place]

 

             Respitory Rate 2018 05:24:00                           [facility]

al [place]

 

             Temperature Oral 2018 05:24:00 98.1 F                    [facility]

[facility] [place]



             (F)                                                 

 

             Respitory Rate 2018 01:15:00                           [facility]

al [facility]

 

             Systolic (mm Hg) 2018 01:15:00                           [facility]

[facility]

 

             Diastolic (mm Hg) 2018 01:15:00                           [facility]

[facility]

 

             Heart Rate   2018 01:15:00                           [facility]

 [place]

 

             Temperature Oral 2018 01:15:00 98.1 F                    [facility]

[facility]



             (F)                                                 

 

             Weight       2018 14:00:00                           [facility]

 [facility]

 

             Weight       2018 14:00:00                           [facility]

 [facility]

 

             Weight       2018 14:00:00                           [facility]

 [place]

 

             BMI Calculated 2018 05:43:00                           [facility]

al [facility]

 

             Height       2018 05:43:00 162.56 cm                 [facility]

 [place]

 

             Height       2018 22:41:00 149.86 cm                 [facility]

 [place]

 

             BMI Calculated 2018 22:41:00                           [facility]

al [facility]







Procedures







                Procedure       Date / Time     Performing Clinician Source



                                Performed                       

 

                POCT GLUCOSE(AGE >30DAYS) 2023 20:33:00 Schoenstein, Lynda

 [facility]

 

                COMP. METABOLIC PANEL 2023 20:32:00 Schoenstein, Lynda [facility]

[facility] [place]



                ([id number]

 

                CBC WITH DIFF   2023 20:32:00 Schoenstein, [facility]

 

                [facility]      [facility]23 [facility]32[facility]

 

                INSURANCE CORRESPONDENCE 2023 05:01:00 Doctor Unassigned, 

No [facility]

 

                POCT GLUCOSE (AUTOMATED) 2023 22:41:00 Radha Fofana [facility]

 

                LIPASE          2023 21:39:00 Radha Fofana [facility]

 

                COMP. METABOLIC PANEL 2023 21:39:00 Radha Fofana [facility]



                ([id number]

 

                AC PANEL 21 + LACTIC ACID 2023 20:49:00 Radha Fofana [facility]

 

                CBC WITH DIFF   2023 20:48:00 Radha Fofana [facility]

 

                URINALYSIS      2023 20:48:00 Radha Fofana [facility]

 

                POCT GLUCOSE (AUTOMATED) 2023 19:37:00 Radha Fofana [facility]

 

                POCT GLUCOSE (AUTOMATED) 2023-04-10 02:38:00 [facility]

 

                URINALYSIS      2023 23:37:00 Neeta Maria [facility]

 

                COMP. METABOLIC PANEL 2023 23:12:00 Neeta Maria [facility]

[facility] [place]



                ([id number]

 

                CBC WITH DIFF   2023 23:12:00 Neeta Maria [facility]

 

                AC PANEL 20 + LACTIC ACID 2023 23:10:00 [facility]

 

                URINE CULTURE   2023 19:14:00 Jimena [facility]

 

                XR SKULL <4 VW  2023 00:48:18 Bossman Villa [facility]

 

                POCT GLUCOSE (AUTOMATED) 2023 00:14:00 Bossman Villa

 [facility]

 

                URINALYSIS      2023 23:13:00 Bossman Villa [facility]

 

                XR CHEST 1 VW   2023 22:46:00 Bossman Villa [facility]

 

                POCT GLUCOSE (AUTOMATED) 2023 22:34:00 Bossman Villa

 [facility]

 

                CT ANGIOGRAM    2023 22:21:20 [facility]man Villa [facility] [place]



                ABDOMEN/PELVIS                                  [facility]

 

                LACTIC ACID WHOLE BLOOD 2023 21:48:00 Bossman Villa 

[facility]

 

                LIPASE          2023 21:47:00 Bossman Villa [facility]

 

                MAGNESIUM       2023 21:47:00 Bossman Villa [facility]

 

                COMP. METABOLIC PANEL 2023 21:47:00 Bossman Villa Un

[facility]



                ([id number])                                         [facility]

 

                CBC WITH DIFF   2023 21:47:00 Bossman Villa [facility]

 

                COVID-19 (ID NOW RAPID 2023 21:47:00 Bossman Villa U

[facility]



                TESTING)                                        [facility]

 

                POCT GLUCOSE (AUTOMATED) 2023 18:22:00 Ruddy [facility]

 

                POCT GLUCOSE (AUTOMATED) 2023 18:22:00 Ruddy [facility]

 

                POCT GLUCOSE (AUTOMATED) 2023 13:41:00 Ruddy [facility]

 

                POCT GLUCOSE (AUTOMATED) 2023 13:41:00 Ruddy [facility]

 

                BASIC METABOLIC PANEL 2023 11:02:00 Donald Fox Uni

[facility] [place]



                (NA, K, CL, CO2, GLUCOSE,                                 Medica

l Branch



                BUN, CREATININE, CA)                                 

 

                CBC WITHOUT DIFF 2023 11:02:00 Donald Fox [facility]

 

                BASIC METABOLIC PANEL 2023 11:02:00 Donald Fox Uni

[facility] [place]



                (NA, K, CL, CO2, GLUCOSE,                                 Medica

l Branch



                BUN, CREATININE, CA)                                 

 

                CBC WITHOUT DIFF 2023 11:02:00 Donald Fox [facility]

 

                POCT GLUCOSE (AUTOMATED) 2023 02:49:00 Ruddy [facility]

 

                POCT GLUCOSE (AUTOMATED) 2023 02:49:00 Ruddy [facility]

 

                POCT GLUCOSE (AUTOMATED) 2023 22:57:00 [facility]

 

                POCT GLUCOSE (AUTOMATED) 2023 22:57:00 [facility]

 

                POCT GLUCOSE (AUTOMATED) 2023 21:25:00 [facility]

 

                POCT GLUCOSE (AUTOMATED) 2023 21:25:00 Ruddy [facility]

 

                TISSUE          2023 20:31:00 Ruddy [facility]



                CULTURE(AEROBIC/ANAEROBIC                                 Medica

l [place]



                )                                               

 

                FUNGUS (ROUTINE) CULTURE 2023 20:31:00 Vikash Anglin [facility]

 

                TISSUE          2023 20:31:00 Ruddy [facility]



                CULTURE(AEROBIC/ANAEROBIC                                 Medica

l [place]



                )                                               

 

                FUNGUS (ROUTINE) CULTURE 2023 20:31:00 Vikash Anglin [facility]

 

                WOUND DEBRIDEMENT 2023 19:57:00 Derrek [facility]

 

                WOUND DEBRIDEMENT 2023 19:57:00 Vikash Anglin [facility]

 

                POCT GLUCOSE (AUTOMATED) 2023 18:17:00 Ruddy [facility]

 

                POCT GLUCOSE (AUTOMATED) 2023 18:17:00 Ruddy [facility]

 

                POCT GLUCOSE (AUTOMATED) 2023 15:20:00 Ruddy [facility]

 

                POCT GLUCOSE (AUTOMATED) 2023 15:20:00 Ruddy [facility]

 

                POCT GLUCOSE (AUTOMATED) 2023 03:10:00 Ruddy [facility]

 

                POCT GLUCOSE (AUTOMATED) 2023 03:10:00 Ruddy [facility]

 

                POCT GLUCOSE (AUTOMATED) 2023 22:42:00 Ruddy [facility]

 

                POCT GLUCOSE (AUTOMATED) 2023 22:42:00 Ruddy [facility]

 

                POCT GLUCOSE (AUTOMATED) 2023 17:34:00 Noe Phillips

[facility]

 

                POCT GLUCOSE (AUTOMATED) 2023 17:34:00 Noe Phillips

[facility]

 

                POCT GLUCOSE (AUTOMATED) 2023 16:41:00 Noe Phillips

[facility]

 

                POCT GLUCOSE (AUTOMATED) 2023 16:41:00 Noe Phillips

[facility]

 

                POCT GLUCOSE (AUTOMATED) 2023 13:15:00 [facility]

 

                POCT GLUCOSE (AUTOMATED) 2023 13:15:00 PhillipsNoe valle

[facility]

 

                POCT GLUCOSE (AUTOMATED) 2023 07:38:00 [facility]

 

                POCT GLUCOSE (AUTOMATED) 2023 07:38:00 [facility]

 

                POCT GLUCOSE (AUTOMATED) 2023 06:43:00 [facility]

 

                POCT GLUCOSE (AUTOMATED) 2023 06:43:00 [facility]

 

                BASIC METABOLIC PANEL 2023 06:03:00 Yoni Mariscal [facility]



                (NA, K, CL, CO2, GLUCOSE,                                 Medica

l Branch



                BUN, CREATININE, CA)                                 

 

                BASIC METABOLIC PANEL 2023 06:03:00 Yoni Mariscal [facility]



                (NA, K, CL, CO2, GLUCOSE,                                 Medica

l Branch



                BUN, CREATININE, CA)                                 

 

                POCT GLUCOSE (AUTOMATED) 2023 05:32:00 [facility]

 

                POCT GLUCOSE (AUTOMATED) 2023 05:32:00 [facility]

 

                URINE CULTURE   2023 04:20:00 Davey [facility]

 

                URINE CULTURE   2023 04:20:00 Davey [facility]

 

                AC PANEL 21 + LACTIC ACID 2023 04:18:00 [place], Toro

r [facility]

 

                AC PANEL 21 + LACTIC ACID 2023 04:18:00 Toro Mariscal [facility]

 

                BASIC METABOLIC PANEL 2023 02:48:00 Yoni Mariscal [facility]



                (NA, K, CL, CO2, GLUCOSE,                                 Medica

l Branch



                BUN, CREATININE, CA)                                 

 

                CBC WITH DIFF   2023 02:48:00 Davey [facility]

 

                URINALYSIS      2023 02:48:00 Mission, [facility]

 

                BASIC METABOLIC PANEL 2023 02:48:00 Yoni Mariscal [facility]



                (NA, K, CL, CO2, GLUCOSE,                                 Medica

l Branch



                BUN, CREATININE, CA)                                 

 

                CBC WITH DIFF   2023 02:48:00 Davey [facility]

 

                URINALYSIS      2023 02:48:00 Davey [facility]

 

                POCT GLUCOSE (AUTOMATED) 2023 02:37:00 Bossman Villa

 [facility]

 

                LIPASE          2023 00:31:00 Bossman Villa [facility]

 

                COMP. METABOLIC PANEL 2023 00:31:00 Bossman Villa [facility]



                ([id number])                                         [facility]

 

                URINALYSIS      2023-02-10 23:49:00 Bossman Villa [facility]

 

                CBC WITH DIFF   2023-02-10 23:43:00 Bossman Villa [facility]

 

                AC PANEL 21 + LACTIC ACID 2023-02-10 00:00:00 Inocencia Golden

 [facility]

 

                POCT GLUCOSE (AUTOMATED) 2023 23:07:00 Inocencia Golden 

[facility]

 

                POCT GLUCOSE (AUTOMATED) 2023 22:13:00 Inocencia Golden 

[facility]

 

                MAGNESIUM       2023 21:40:00 Inocencia Golden [facility]

 

                COMP. METABOLIC PANEL 2023 21:40:00 Inocencia Golden [facility]



                ([id number])                                         [facility]

 

                CBC WITH DIFF   2023 21:40:00 Inocencia Golden [facility]

 

                POCT GLUCOSE (AUTOMATED) 2023 21:13:00 Inocencia Golden 

[facility]

 

                POCT GLUCOSE (AUTOMATED) 2023 23:54:00 Rand Dong  [facility]

 

                POCT GLUCOSE (AUTOMATED) 2023 17:33:00 Rand Dong  [facility]

 

                POCT GLUCOSE (AUTOMATED) 2023 13:57:00 Rand Dong  [facility]

 

                PHOSPHORUS      2023 11:34:00 Lea reymundo  [facility]

 

                MAGNESIUM       2023 11:34:00 Lea [facility]

 

                FREE T4         2023 11:34:00 Lea reymundo  [facility]

 

                THYROID STIMULATING 2023 11:34:00 Rand Dong  [facility]



                HORMONE                                         [facility]

 

                COMP. METABOLIC PANEL 2023 11:34:00 Rand Dong  [facility]



                ([id number])                                         [facility]

 

                LIPID PANEL ([id number])(TOTAL 2023 11:34:00 Rand Dong  [facility]



                CHOLESTEROL,                                    [facility]



                TRIGLYCERIDES, HDL)                                 

 

                CBC WITH DIFF   2023 11:34:00 Rand Dong  [facility]

 

                GLYCOSYLATED HEMOGLOBIN 2023 11:34:00 Lea reymundo  [facility]

[facility] [place]



                (A1C)                                           [facility]

 

                N-TERMINAL PRO-BNP 2023 11:34:00 Rand Dong  [facility]

 

                FREE T3         2023 11:34:00 Lea reymundo  [facility]

 

                POCT GLUCOSE (AUTOMATED) 2023 09:33:00 Lea reymundo  [facility]

 

                POCT GLUCOSE (AUTOMATED) 2023 02:19:00 Radha Fofana [facility]

 

                POCT GLUCOSE (AUTOMATED) 2023 01:09:00 Radha Fofana [facility]

 

                POCT GLUCOSE (AUTOMATED) 2023 23:44:00 Radha Fofana [facility]

 

                CT ABDOMEN PELVIS W 2023 23:40:00 Radha Fofana [facility]

 

                URINALYSIS      2023 22:21:00 Radha Fofana [facility]

 

                POCT GLUCOSE (AUTOMATED) 2023 22:19:00 Radha Fofana [facility]

 

                AC PANEL 20 + LACTIC ACID 2023 20:54:00 [facility] 

[facility]

 

                LIPASE          2023 20:41:00 Radha Fofana [facility]

 

                TROPONIN I      2023 20:41:00 Radha Fofana [facility]

 

                COMP. METABOLIC PANEL 2023 20:41:00 Radha Fofana [facility]



                ([id number])                                         [facility]

 

                CBC WITH DIFF   2023 20:41:00 Radha Fofana [facility]

 

                N-TERMINAL PRO-BNP 2023 20:41:00 Radha Fofana [facility]

 

                HB ECG ROUTINE & RHYTHM 2023 20:37:16 Radha Fofana [facility]

 

                EMERGENCY DEPARTMENT 2023 06:01:00 Doctor Unassigned, No U

[facility]

 

                PATIENT QUESTIONNAIRE 2022 06:01:00 Doctor Unassigned, No 

[facility]

 

                POCT HEMOGLOBIN A1C TEST 2022 19:04:00 Mary Anne Ramirez         [facility]

 

                POCT GLUCOSE (AUTOMATED) 2022 22:58:00 Edwardo Lopez   [facility]

 

                POCT GLUCOSE (AUTOMATED) 2022 18:00:00 Edwardo Lopez   [facility]

 

                POCT GLUCOSE (AUTOMATED) 2022 13:46:00 Edwardo Lopez   [facility]

 

                BASIC METABOLIC PANEL 2022 10:49:00 Sunil Lu  [facility]

[facility] [place]



                (NA, K, CL, CO2, GLUCOSE,                                 Medica

l Branch



                BUN, CREATININE, CA)                                 

 

                CBC WITH DIFF   2022 10:49:00 Sunil Lu  [place] [facility]

f [facility] [place] [facility]

 

                LACTIC ACID WHOLE BLOOD 2022 02:31:00 Sunil Lu  [facility]

 

                MRSA / MSSA SCREEN BY 2022 02:31:00 Edwardo Lopez   [facility]



                PCR, [facility]

 

                POCT GLUCOSE (AUTOMATED) 2022 02:09:00 Edwardo Lopez   [facility]

 

                BLOOD CULTURE SCREEN 2022 00:35:00 Neeta Maria [facility]

 

                URINALYSIS      2022 23:26:00 Neeta Maria [facility]

 

                BLOOD CULTURE SCREEN 2022 23:15:00 Neeta Maria [facility]

 

                COMP. METABOLIC PANEL 2022 23:08:00 Neeta Maria [facility]

[facility] [place]



                ([id number])                                         [facility]

 

                CBC WITH DIFF   2022 23:08:00 Neeta Maria [facility]

 

                LACTIC ACID WHOLE BLOOD 2022 23:07:00 Neeta Maria [facility]

 

                CT ABDOMEN PELVIS W 2022-10-30 02:06:00 Silvino Garay [facility]



                CONTRAST                                        [facility]

 

                LIPASE          2022-10-30 00:59:00 Silvino Garay [facility]

 

                COMP. METABOLIC PANEL 2022-10-30 00:59:00 Silvino Garay [facility]



                ([id number])                                         [facility]

 

                CBC WITH DIFF   2022-10-30 00:59:00 Silvino Garay [facility]

 

                URINALYSIS      2022-10-30 00:49:00 Silvino Garay [facility]

 

                POCT GLUCOSE (AUTOMATED) 2022-10-23 13:14:00 Rachel Bailey  [facility]

 

                POCT GLUCOSE (AUTOMATED) 2022-10-23 01:14:00 Rachel Bailey  [facility]

 

                POCT GLUCOSE (AUTOMATED) 2022-10-22 21:19:00 [facility]  [facility]

 

                BASIC METABOLIC PANEL 2022-10-22 11:23:00 Marcelle [facility]



                (NA, K, CL, CO2, GLUCOSE,                                 Medica

l Branch



                BUN, CREATININE, CA)                                 

 

                CBC WITH DIFF   2022-10-22 11:16:00 Marcelle [facility] o

f [facility] [place] [facility]

 

                MRSA / MSSA SCREEN BY 2022-10-22 01:13:00 Edwardo Lopez   [facility]



                [facility]

 

                POCT GLUCOSE (AUTOMATED) 2022-10-22 01:11:00 Marcelle Mercy  [facility]

 

                POCT GLUCOSE (AUTOMATED) 2022-10-21 21:32:00 [facility]  [facility]

 

                URINE CULTURE   2022-10-21 16:17:00 Marcelle [facility]

[facility]

 

                POCT GLUCOSE (AUTOMATED) 2022-10-21 16:08:00 Marcelle [facility]

 

                POCT GLUCOSE (AUTOMATED) 2022-10-21 12:43:00 Marcelle [facility]

 

                ASPIRATE OR ABSCESS 2022-10-21 07:35:00 Davey Christopher [facility]

[facility] [place]



                CULTURE(AEROBIC/ANAEROBIC                                 Medica

l Branch



                )                                               

 

                URINALYSIS      2022-10-21 07:32:00 Davey [facility]

 

                CT ABDOMEN PELVIS W 2022-10-21 07:25:15 Davey Christopher [facility]

[facility] [place]



                CONTRAST                                        [facility]

 

                BLOOD CULTURE SCREEN 2022-10-21 06:01:00 Yoni Mariscal [facility]

 

                LIPASE          2022-10-21 06:01:00 [facility]

 

                COMP. METABOLIC PANEL 2022-10-21 06:01:00 Yoni Mariscal [facility]



                ([id number])                                         [facility]

 

                CBC WITH DIFF   2022-10-21 06:01:00 Yoni Mariscal [facility]

 

                POCT GLUCOSE (AUTOMATED) 2022 18:34:00 Pedro Sharpe

[facility]

 

                POCT GLUCOSE (AUTOMATED) 2022 14:37:00 Pedro Sharpe

[facility]

 

                BASIC METABOLIC PANEL 2022 13:22:00 [facility]



                (NA, K, CL, CO2, GLUCOSE,                                 Medica

l Branch



                BUN, CREATININE, CA)                                 

 

                CBC WITH DIFF   2022 13:22:00 Finn [facility]

 

                POCT GLUCOSE (AUTOMATED) 2022 10:56:00 Pedro Sharpe

[facility]

 

                POCT GLUCOSE (AUTOMATED) 2022 05:46:00 Pedro Sharpe

[facility]

 

                POCT GLUCOSE (AUTOMATED) 2022 02:17:00 Pedro Sharpe

[facility]

 

                POCT GLUCOSE (AUTOMATED) 2022 23:13:00 Pedro Sharpe

[facility]

 

                POCT GLUCOSE (AUTOMATED) 2022 18:22:00 Pedro Sharpe

[facility]

 

                POCT GLUCOSE (AUTOMATED) 2022 14:56:00 Pedro Sharpe

[facility]

 

                BASIC METABOLIC PANEL 2022 10:03:00 Yvrose Brisenonathan Univ

[facility] [place]



                (NA, K, CL, CO2, GLUCOSE,                                 Medica

l Branch



                BUN, CREATININE, CA)                                 

 

                CBC WITH DIFF   2022 10:03:00 Finn [facility]

 

                POCT GLUCOSE (AUTOMATED) 2022 02:18:00 Pedro Sharpe

[facility]

 

                POCT GLUCOSE (AUTOMATED) 2022-08-10 22:26:00 Pedro Sharpe

[facility]

 

                POCT GLUCOSE (AUTOMATED) 2022-08-10 18:57:00 Pedro Sharpe

[facility]

 

                POCT GLUCOSE (AUTOMATED) 2022-08-10 18:57:00 Pedro Sharpe

[facility]

 

                US DUPLEX VENOUS ARMS 2022-08-10 17:28:45 Finn Matt [facility]

[facility] [place]



                BILATERAL - BY VASCULAR                                 [facility]



                LAB                                             

 

                US DUPLEX VENOUS ARMS 2022-08-10 17:28:45 Finn Matt [facility]

[facility] [place]



                BILATERAL - BY VASCULAR                                 [facility]



                LAB                                             

 

                POCT GLUCOSE (AUTOMATED) 2022-08-10 15:20:00 Pedro [facility]

 

                POCT GLUCOSE (AUTOMATED) 2022-08-10 15:20:00 Pedro Sharpe

[facility]

 

                MAGNESIUM       2022-08-10 10:19:00 Stas [facility]

 

                BASIC METABOLIC PANEL 2022-08-10 10:19:00 Stas Edwardo Unive

[facility] [place]



                (NA, K, CL, CO2, GLUCOSE,                 Robin         Medica

l Branch



                BUN, CREATININE, CA)                                 

 

                CBC WITH DIFF   2022-08-10 10:19:00 Stas [facility]

 

                MAGNESIUM       2022-08-10 10:19:00 Stas [facility]

 

                BASIC METABOLIC PANEL 2022-08-10 10:19:00 Stas Edwardo Unive

[facility] [place]



                (NA, K, CL, CO2, GLUCOSE,                 Robin         Medica

l Branch



                BUN, CREATININE, CA)                                 

 

                CBC WITH DIFF   2022-08-10 10:19:00 Stas [facility]

 

                POCT GLUCOSE (AUTOMATED) 2022-08-10 02:44:00 Pedro Sharpe

[facility]

 

                POCT GLUCOSE (AUTOMATED) 2022-08-10 02:44:00 Pedro Sharpe

[facility]

 

                POCT GLUCOSE (AUTOMATED) 2022 22:02:00 Pedro Sharpe

[facility]

 

                POCT GLUCOSE (AUTOMATED) 2022 22:02:00 Pedro Sharpe

[facility]

 

                FUNGUS (ROUTINE) CULTURE 2022 17:03:00 Merry Bonds [facility]               [facility] [place]

 

                TISSUE          2022 17:03:00 Sapphire Bonds [facility]



                CULTURE(AEROBIC/ANAEROBIC                 A               Medica

l Branch



                )                                               

 

                FUNGUS (ROUTINE) CULTURE 2022 17:03:00 [facility]

 

                TISSUE          2022 17:03:00 Sapphire Bonds [facility]



                CULTURE(AEROBIC/ANAEROBIC                 A               Medica

l Branch



                )                                               

 

                FUNGUS (ROUTINE) CULTURE 2022 17:00:00 Merry [facility]

 

                TISSUE          2022 17:00:00 Sapphire Bonds [facility]



                CULTURE(AEROBIC/ANAEROBIC                 A               Medica

l Branch



                )                                               

 

                FUNGUS (ROUTINE) CULTURE 2022 17:00:00 Merry [facility]

 

                TISSUE          2022 17:00:00 Sapphire Bonds [facility]



                CULTURE(AEROBIC/ANAEROBIC                 A               Medica

l Branch



                )                                               

 

                FUNGUS (ROUTINE) CULTURE 2022 16:59:00 Merry [facility]

 

                TISSUE          2022 16:59:00 Sapphire Bonds [facility]



                CULTURE(AEROBIC/ANAEROBIC                 A               Medica

l Branch



                )                                               

 

                FUNGUS (ROUTINE) CULTURE 2022 16:59:00 Merry [facility]

 

                TISSUE          2022 16:59:00 Sapphire Bonds [facility]



                CULTURE(AEROBIC/ANAEROBIC                 A               Medica

l Branch



                )                                               

 

                FOOT DEBRIDEMENT 2022 16:11:00 Sapphire Bonds [facility]               [facility] [place]

 

                FOOT DEBRIDEMENT 2022 16:11:00 Sapphire Bonds [facility]

 

                COVID-19 (ID NOW RAPID 2022 14:36:00 Matt Briseno Uni

[facility] [place]



                TESTING)                                        [facility]

 

                LAB ONLY COVID  2022 14:36:00 Matt Briseno [facility]



                INTERPRETATION                                  [facility]

 

                COVID-19 (ID NOW RAPID 2022 14:36:00 Matt Briseno Uni

[facility] [place]



                TESTING)                                        [facility]

 

                LAB ONLY COVID  2022 14:36:00 Matt Briseno [facility]

 

                POCT GLUCOSE (AUTOMATED) 2022 14:07:00 León Chambers  Uni

[facility]

 

                POCT GLUCOSE (AUTOMATED) 2022 14:07:00 León Chambers  Uni

[facility]

 

                HB ECG ROUTINE & RHYTHM 2022 13:27:17 Jessi Topete [facility]

 

                CBC WITH DIFF   2022 10:55:00 Stas [facility]

 

                CBC WITH DIFF   2022 10:55:00 Stas [facility]

 

                MAGNESIUM       2022 10:40:00 Stas [facility]

 

                BASIC METABOLIC PANEL 2022 10:40:00 Stas Edwardo [facility]

[facility] [place]



                (NA, K, CL, CO2, GLUCOSE,                 Robin         Medica

l Branch



                BUN, CREATININE, CA)                                 

 

                COVID-19 (ID NOW RAPID 2022 10:40:00 [facility]



                TESTING)                                        Medical Branch

 

                LAB ONLY COVID  2022 10:40:00 [facility]



                INTERPRETATION                                  Medical Branch

 

                MAGNESIUM       2022 10:40:00 Stas [facility]

 

                BASIC METABOLIC PANEL 2022 10:40:00 Edwardo Mcclelland Unive

[facility] [place]



                (NA, K, CL, CO2, GLUCOSE,                 Robin         Medica

l Branch



                BUN, CREATININE, CA)                                 

 

                COVID-19 (ID NOW RAPID 2022 10:40:00 [facility]



                TESTING)                                        Medical Branch

 

                LAB ONLY COVID  2022 10:40:00 [facility]

 

                POCT GLUCOSE (AUTOMATED) 2022 02:29:00 León Chambers  Uni

[facility]

 

                POCT GLUCOSE (AUTOMATED) 2022 02:29:00 León Chambers  Uni

[facility]

 

                POCT GLUCOSE (AUTOMATED) 2022 23:39:00 León Chambers  Uni

[facility]

 

                POCT GLUCOSE (AUTOMATED) 2022 23:39:00 León Chambers  Uni

[facility]

 

                POCT GLUCOSE (AUTOMATED) 2022 17:10:00 León Chambers  Uni

[facility]

 

                POCT GLUCOSE (AUTOMATED) 2022 17:10:00 León Chambers  Uni

[facility]

 

                POCT GLUCOSE (AUTOMATED) 2022 17:10:00 León Chambers  Uni

[facility]

 

                POCT GLUCOSE (AUTOMATED) 2022 15:54:00 León Chambers  Uni

[facility]

 

                POCT GLUCOSE (AUTOMATED) 2022 15:54:00 León Chambers  Uni

[facility]

 

                POCT GLUCOSE (AUTOMATED) 2022 15:54:00 León Chambers  Uni

[facility]

 

                SEDIMENTATION RATE 2022 08:36:00 Isabell [facility]

 

                BASIC METABOLIC PANEL 2022 08:36:00 [facility]



                (NA, K, CL, CO2, GLUCOSE,                                 Medica

l Branch



                BUN, CREATININE, CA)                                 

 

                CBC WITH DIFF   2022 08:36:00 Zaki [facility]

 

                MAGNESIUM       2022 08:36:00 Zaki [facility]

 

                MAGNESIUM       2022 08:36:00 Zaki [facility]

 

                BASIC METABOLIC PANEL 2022 08:36:00 Zaki [facility]



                (NA, K, CL, CO2, GLUCOSE,                                 Medica

l Branch



                BUN, CREATININE, CA)                                 

 

                SEDIMENTATION RATE 2022 08:36:00 Isabell [facility]

 

                CBC WITH DIFF   2022 08:36:00 Zaki [facility]

 

                MAGNESIUM       2022 08:36:00 Zaki [facility]

 

                BASIC METABOLIC PANEL 2022 08:36:00 [facility]



                (NA, K, CL, CO2, GLUCOSE,                                 Medica

l Branch



                BUN, CREATININE, CA)                                 

 

                SEDIMENTATION RATE 2022 08:36:00 Karina Whyte     [facility]

 

                CBC WITH DIFF   2022 08:36:00 Zaki [facility]

 

                POCT GLUCOSE (AUTOMATED) 2022 00:57:00 Yo Law B Un

iversity of 

[facility]

 

                POCT GLUCOSE (AUTOMATED) 2022 00:57:00 Yo Law B Un

iversity of 

[facility]

 

                POCT GLUCOSE (AUTOMATED) 2022 00:57:00 Yo Law B Un

iversity of 

[facility]

 

                POCT GLUCOSE (AUTOMATED) 2022 21:26:00 Yo Law B Un

iversity of 

[facility]

 

                POCT GLUCOSE (AUTOMATED) 2022 21:26:00 Yo Law B Un

iversity of 

[facility]

 

                POCT GLUCOSE (AUTOMATED) 2022 21:26:00 Yo Law B Un

iversity of 

[facility]

 

                POCT GLUCOSE (AUTOMATED) 2022 16:47:00 Yo Law B Un

iversity of 

[facility]

 

                POCT GLUCOSE (AUTOMATED) 2022 16:47:00 Yo Law B Un

iversity of 

[facility]

 

                POCT GLUCOSE (AUTOMATED) 2022 16:47:00 Yo Law B Un

iversity of 

[facility]

 

                BASIC METABOLIC PANEL 2022 14:21:00 [facility]



                (NA, K, CL, CO2, GLUCOSE,                                 Medica

l Branch



                BUN, CREATININE, CA)                                 

 

                C-REACTIVE PROTEIN 2022 14:21:00 Isabell [facility]

 

                C-REACTIVE PROTEIN 2022 14:21:00 Isabell [facility]

 

                BASIC METABOLIC PANEL 2022 14:21:00 Zaki [facility]



                (NA, K, CL, CO2, GLUCOSE,                                 Medica

l Branch



                BUN, CREATININE, CA)                                 

 

                C-REACTIVE PROTEIN 2022 14:21:00 Isabell [facility]

 

                BASIC METABOLIC PANEL 2022 14:21:00 [facility]



                (NA, K, CL, CO2, GLUCOSE,                                 Medica

l Branch



                BUN, CREATININE, CA)                                 

 

                POCT GLUCOSE (AUTOMATED) 2022 12:34:00 Abdirahman Lawy B Un

iversity of 

[facility]

 

                POCT GLUCOSE (AUTOMATED) 2022 12:34:00 SalemAbdirahmany B Un

[facility] 

[place]



                                                                Medical Branch

 

                POCT GLUCOSE (AUTOMATED) 2022 12:34:00 Abdirahman Lawy B Un

iversity of 

[facility]

 

                POCT GLUCOSE (AUTOMATED) 2022 01:28:00 Thanh Yo B Un

[facility] 

[place]



                                                                Medical Branch

 

                POCT GLUCOSE (AUTOMATED) 2022 01:28:00 SalemAbdirahmany B Un

[facility] 

[place]



                                                                Medical Branch

 

                POCT GLUCOSE (AUTOMATED) 2022 01:28:00 Thanh Yo B Un

[facility] 

[place]



                                                                Medical Branch

 

                POCT GLUCOSE (AUTOMATED) 2022 21:13:00 ThanhAbdirahmany B Un

[facility] 

[place]



                                                                Medical Branch

 

                POCT GLUCOSE (AUTOMATED) 2022 21:13:00 Thanh Yo B Un

[facility] 

[place]



                                                                Medical Branch

 

                POCT GLUCOSE (AUTOMATED) 2022 21:13:00 Salem Yo B Un

[facility] 

[place]



                                                                Medical Branch

 

                POCT GLUCOSE (AUTOMATED) 2022 16:39:00 Thanh, Yo B Un

[facility] 

[place]



                                                                Medical Branch

 

                POCT GLUCOSE (AUTOMATED) 2022 16:39:00 Thanh Yo B Un

iversity of 

[facility]

 

                POCT GLUCOSE (AUTOMATED) 2022 16:39:00 [place], Yo B Un

iversity of 

[facility]

 

                POCT GLUCOSE (AUTOMATED) 2022 13:11:00 Yo Law Un

iversity of 

[facility]

 

                POCT GLUCOSE (AUTOMATED) 2022 13:11:00 Yo Law Un

iversity of 

[facility]

 

                POCT GLUCOSE (AUTOMATED) 2022 13:11:00 Yo Law Un

iversity of 

[facility]

 

                MAGNESIUM       2022 10:28:00 [facility]

 

                BASIC METABOLIC PANEL 2022 10:28:00 Butt, [facility]



                (NA, K, CL, CO2, GLUCOSE,                                 Medica

l Branch



                BUN, CREATININE, CA)                                 

 

                MAGNESIUM       2022 10:28:00 [facility], [facility]

 

                BASIC METABOLIC PANEL 2022 10:28:00 [facility]



                (NA, K, CL, CO2, GLUCOSE,                                 Medica

l Branch



                BUN, CREATININE, CA)                                 

 

                MAGNESIUM       2022 10:28:00 [facility], [facility]

 

                BASIC METABOLIC PANEL 2022 10:28:00 [facility]



                (NA, K, CL, CO2, GLUCOSE,                                 Medica

l Branch



                BUN, CREATININE, CA)                                 

 

                POCT GLUCOSE (AUTOMATED) 2022 10:01:00 Yo Law Un

iversity of 

[facility]

 

                POCT GLUCOSE (AUTOMATED) 2022 10:01:00 Yo Law Un

iversity of 

[facility]

 

                POCT GLUCOSE (AUTOMATED) 2022 10:01:00 Yo Law Un

iversity of 

[facility]

 

                POCT GLUCOSE (AUTOMATED) 2022 06:32:00 Yo Law Un

iversity of 

[facility]

 

                POCT GLUCOSE (AUTOMATED) 2022 06:32:00 Yo Law Un

iversity of 

[facility]

 

                POCT GLUCOSE (AUTOMATED) 2022 06:32:00 Yo Law Un

iversity of 

[facility]

 

                BASIC METABOLIC PANEL 2022 02:01:00 Chana [facility]



                (NA, K, CL, CO2, GLUCOSE,                                 Medica

l Branch



                BUN, CREATININE, CA)                                 

 

                BASIC METABOLIC PANEL 2022 02:01:00 Chana [facility]



                (NA, K, CL, CO2, GLUCOSE,                                 Medica

l Branch



                BUN, CREATININE, CA)                                 

 

                BASIC METABOLIC PANEL 2022 02:01:00 Chana [facility]



                (NA, K, CL, CO2, GLUCOSE,                                 Medica

l Branch



                BUN, CREATININE, CA)                                 

 

                POCT GLUCOSE (AUTOMATED) 2022 01:53:00 Yo Law Un

iversity of 

[facility]

 

                POCT GLUCOSE (AUTOMATED) 2022 01:53:00 Yo Law Un

iversity of 

[facility]

 

                POCT GLUCOSE (AUTOMATED) 2022 01:53:00 Yo Law Un

iversity of 

[facility]

 

                POCT GLUCOSE (AUTOMATED) 2022 23:02:00 Yo Law Un

iversity of 

[facility]

 

                POCT GLUCOSE (AUTOMATED) 2022 23:02:00 Yo Law Un

[facility] 

[place]



                                                                Medical Branch

 

                POCT GLUCOSE (AUTOMATED) 2022 23:02:00 Yo Law Un

iversity of 

[facility]

 

                XR FOOT 3+ VW BILATERAL 2022 21:35:00 V[facility]



                                                A               Medical Branch

 

                XR FOOT 3+ VW BILATERAL 2022 21:35:00 V[facility]



                                                A               Medical Branch

 

                XR FOOT 3+ VW BILATERAL 2022 21:35:00 Vam[address] [facility]               Medical [place]

 

                POCT GLUCOSE (AUTOMATED) 2022 20:45:00 Yo Law Un

iversity of 

[facility]

 

                POCT GLUCOSE (AUTOMATED) 2022 20:45:00 Yo Law Un

iversity of 

[facility]

 

                POCT GLUCOSE (AUTOMATED) 2022 20:45:00 Yo Law Un

iversity of 

[facility]

 

                POCT GLUCOSE (AUTOMATED) 2022 16:41:00 Yo Law Un

iversity of 

[facility]

 

                POCT GLUCOSE (AUTOMATED) 2022 16:41:00 Yo Law Un

iversity of 

[facility]

 

                POCT GLUCOSE (AUTOMATED) 2022 16:41:00 Yo Law Un

iversity of 

[facility]

 

                BASIC METABOLIC PANEL 2022 15:35:00 [facility]

[facility]



                (NA, K, CL, CO2, GLUCOSE,                                 Medica

l Branch



                BUN, CREATININE, CA)                                 

 

                BASIC METABOLIC PANEL 2022 15:35:00 Yo Law [facility]

[facility]



                (NA, K, CL, CO2, GLUCOSE,                                 Medica

l Branch



                BUN, CREATININE, CA)                                 

 

                BASIC METABOLIC PANEL 2022 15:35:00 Yo Law [facility]

[facility]



                (NA, K, CL, CO2, GLUCOSE,                                 Medica

l Branch



                BUN, CREATININE, CA)                                 

 

                BETA HYDROXY-BUTYRATE 2022 15:34:00 Donald Collazo     [facility]

 

                BETA HYDROXY-BUTYRATE 2022 15:34:00 [facility]

 

                BETA HYDROXY-BUTYRATE 2022 15:34:00 Donald Collazo     [facility]

 

                POCT GLUCOSE (AUTOMATED) 2022 14:20:00 Yo Law Un

iversity of 

[facility]

 

                POCT GLUCOSE (AUTOMATED) 2022 14:20:00 Yo Law Un

iversity of 

[facility]

 

                POCT GLUCOSE (AUTOMATED) 2022 14:20:00 Yo Law Un

iversity of 

[facility]

 

                POCT GLUCOSE (AUTOMATED) 2022 12:52:00 Yo Law Un

iversity of 

[facility]

 

                POCT GLUCOSE (AUTOMATED) 2022 12:52:00 Yo Law Un

iversity of 

[facility]

 

                POCT GLUCOSE (AUTOMATED) 2022 12:52:00 Yo Law Un

iversity of 

[facility]

 

                POCT GLUCOSE (AUTOMATED) 2022 09:04:00 Yo Law Un

iversity of 

[facility]

 

                POCT GLUCOSE (AUTOMATED) 2022 09:04:00 Yo Law Un

iversity of 

[facility]

 

                POCT GLUCOSE (AUTOMATED) 2022 09:04:00 Yo Law Un

iversity of 

[facility]

 

                BASIC METABOLIC PANEL 2022 06:13:00 Skylar Tinajero  [facility]



                (NA, K, CL, CO2, GLUCOSE,                                 Medica

l Branch



                BUN, CREATININE, CA)                                 

 

                GLUTAMIC ACID   2022 06:13:00 Skylar Tinajero  [facility]



                DECARBOXYLASE AB                                 [facility]

 

                CBC WITHOUT DIFF [facility]22 [facility]:13[facility]00 [facility]

 

                MAGNESIUM       2022 06:13:00 Skylar Tinajero  [facility]

 

                MAGNESIUM       2022 06:13:00 Skylar Tinajero  [facility]

 

                BASIC METABOLIC PANEL 2022 06:13:00 Skylar Tinajero  [facility]



                (NA, K, CL, CO2, GLUCOSE,                                 Medica

l Branch



                BUN, CREATININE, CA)                                 

 

                CBC WITHOUT DIFF 2022 06:13:00 [facility]

 

                GLUTAMIC ACID   2022 06:13:00 [facility]



                DECARBOXYLASE AB                                 [facility]

 

                MAGNESIUM       2022 06:13:00 [facility]

 

                BASIC METABOLIC PANEL 2022 06:13:00 Skylar Tinajero  [facility]



                (NA, K, CL, CO2, GLUCOSE,                                 Medica

l Branch



                BUN, CREATININE, CA)                                 

 

                CBC WITHOUT DIFF 2022 06:13:00 [facility]

 

                GLUTAMIC ACID   2022 06:13:00 Skylar Tinajero  [place] [facility]

[facility]

 

                POCT GLUCOSE (AUTOMATED) 2022 05:45:00 Yo Law Un

iversity of 

[facility]

 

                POCT GLUCOSE (AUTOMATED) 2022 05:45:00 Yo Law Un

iversity of 

[facility]

 

                POCT GLUCOSE (AUTOMATED) 2022 05:45:00 Yo Law Un

iversity of 

[facility]

 

                POCT GLUCOSE (AUTOMATED) 2022 01:26:00 Yo Law Un

iversity of 

[facility]

 

                POCT GLUCOSE (AUTOMATED) 2022 01:26:00 Yo Law Un

iversity of 

[facility]

 

                POCT GLUCOSE (AUTOMATED) 2022 01:26:00 Yo Law Un

iversity of 

[facility]

 

                POCT GLUCOSE (AUTOMATED) 2022 23:42:00 Yo Law Un

iversity of 

[facility]

 

                POCT GLUCOSE (AUTOMATED) 2022 23:42:00 Yo Law Un

iversity of 

[facility]

 

                POCT GLUCOSE (AUTOMATED) 2022 23:42:00 Yo Law Un

iversity of 

[facility]

 

                BASIC METABOLIC PANEL 2022 22:51:00 Yo Law Unive

[facility] [place]



                (NA, K, CL, CO2, GLUCOSE,                                 Medica

l Branch



                BUN, CREATININE, CA)                                 

 

                BASIC METABOLIC PANEL 2022 22:51:00 Yo Law Unive

rsity [facility]



                (NA, K, CL, CO2, GLUCOSE,                                 Medica

l Branch



                BUN, CREATININE, CA)                                 

 

                BASIC METABOLIC PANEL 2022 22:51:00 Yo Law Unive

[facility] [place]



                (NA, K, CL, CO2, GLUCOSE,                                 Medica

l Branch



                BUN, CREATININE, CA)                                 

 

                POCT GLUCOSE (AUTOMATED) 2022 22:37:00 Yo Law Un

iversity of 

[facility]

 

                POCT GLUCOSE (AUTOMATED) 2022 22:37:00 Yo Law Un

iversity of 

[facility]

 

                POCT GLUCOSE (AUTOMATED) 2022 22:37:00 Yo Law Un

[facility] of 

[place]



                                                                Medical Branch

 

                POCT GLUCOSE (AUTOMATED) 2022 21:30:00 Yo Law Un

iversity of 

[facility]

 

                POCT GLUCOSE (AUTOMATED) 2022 21:30:00 Yo Law Un

[facility] of 

[place]



                                                                Medical Branch

 

                POCT GLUCOSE (AUTOMATED) 2022 21:30:00 Yo Law Un

iversity of 

[facility]

 

                POCT GLUCOSE (AUTOMATED) 2022 20:05:00 Yo Law Un

iversity of 

[facility]

 

                POCT GLUCOSE (AUTOMATED) 2022 20:05:00 Yo Law Un

iversity of 

[facility]

 

                POCT GLUCOSE (AUTOMATED) 2022 20:05:00 Yo Law Un

iversity of 

[facility]

 

                HB ECG ROUTINE & RHYTHM 2022 19:59:13 Chana New Uni

versity of [facility]

 

                HB ECG ROUTINE & RHYTHM 2022 19:59:13 Shaq Ahrubén Chon Uni

versity of [facility]

 

                HB ECG ROUTINE & RHYTHM 2022 19:59:13 Chana New Uni

versity of [facility]

 

                POCT GLUCOSE (AUTOMATED) 2022 19:06:00 Yo Law Un

iversity of 

[facility]

 

                POCT GLUCOSE (AUTOMATED) 2022 19:06:00 Yo Law Un

iversity of 

[facility]

 

                POCT GLUCOSE (AUTOMATED) 2022 19:06:00 Yo Law Un

iversity of 

[facility]

 

                BLOOD CULTURE SCREEN 2022 18:49:00 [facility]

[facility]

 

                BLOOD CULTURE SCREEN 2022 18:49:00 Carolann Haines [facility]

ity [facility]

 

                BLOOD CULTURE SCREEN 2022 18:49:00 [facility]ann Haines [facility]

[facility]

 

                BLOOD CULTURE SCREEN 2022 18:48:00 Carolann Haines [facility]

 

                BLOOD CULTURE SCREEN 2022 18:48:00 Carolann Haines [facility]

 

                BLOOD CULTURE SCREEN 2022 18:48:00 Haines, Anni [facility]

 

                XR CHEST 1 VW   2022 18:34:00 Butt, [facility]

 

                XR CHEST 1 VW   2022 18:34:00 Butt, [facility] [facility] [place]

 

                XR CHEST 1 VW   2022 18:34:00 [facility], [facility]

 

                BASIC METABOLIC PANEL 2022 17:49:00 Yo Law [facility]

[facility]



                (NA, K, CL, CO2, GLUCOSE,                                 Medica

l Branch



                BUN, CREATININE, CA)                                 

 

                TROPONIN I      2022 17:49:00 [facility]

 

                TROPONIN I      2022 17:49:00 [facility]

 

                BASIC METABOLIC PANEL 2022 17:49:00 Yo Law [facility]

[facility] [place]



                (NA, K, CL, CO2, GLUCOSE,                                 Medica

l Branch



                BUN, CREATININE, CA)                                 

 

                TROPONIN I      2022 17:49:00 [facility]

 

                BASIC METABOLIC PANEL 2022 17:49:00 [facility]

[facility]



                (NA, K, CL, CO2, GLUCOSE,                                 Medica

l Branch



                BUN, CREATININE, CA)                                 

 

                POCT GLUCOSE (AUTOMATED) 2022 17:27:00 Yo Law B Un

iversity [facility]

 

                POCT GLUCOSE (AUTOMATED) 2022 17:27:00 Yo Law B Un

iversity [facility]

 

                POCT GLUCOSE (AUTOMATED) 2022 17:27:00 Yo Law B Un

iversity [facility]

 

                POCT GLUCOSE (AUTOMATED) 2022 15:53:00 Yo Law B Un

iversity [facility]

 

                POCT GLUCOSE (AUTOMATED) 2022 15:53:00 Yo Law Un

[facility] 

[place]



                                                                Medical Branch

 

                POCT GLUCOSE (AUTOMATED) 2022 15:53:00 Yo Law Un

[facility] 

[place]



                                                                Medical Branch

 

                BASIC METABOLIC PANEL 2022 15:32:00 Yo Law Unive

[facility] [place]



                (NA, K, CL, CO2, GLUCOSE,                                 Medica

l Branch



                BUN, CREATININE, CA)                                 

 

                BASIC METABOLIC PANEL 2022 15:32:00 Yo Law Unive

[facility] [place]



                (NA, K, CL, CO2, GLUCOSE,                                 Medica

l Branch



                BUN, CREATININE, CA)                                 

 

                BASIC METABOLIC PANEL 2022 15:32:00 Yo Law Unive

[facility] [place]



                (NA, K, CL, CO2, GLUCOSE,                                 Medica

l Branch



                BUN, CREATININE, CA)                                 

 

                POCT GLUCOSE (AUTOMATED) 2022 14:56:00 Yo Law Un

[facility] 

[place]



                                                                Medical Branch

 

                POCT GLUCOSE (AUTOMATED) 2022 14:56:00 Yo Law Un

[facility] 

[place]



                                                                Medical Branch

 

                POCT GLUCOSE (AUTOMATED) 2022 14:56:00 Yo Law Un

[facility] 

[place]



                                                                Medical Branch

 

                POCT GLUCOSE (AUTOMATED) 2022 13:48:00 Yo Law Un

[facility] of 

[place]



                                                                Medical Branch

 

                POCT GLUCOSE (AUTOMATED) 2022 13:48:00 Yo Law Un

[facility] of 

[place]



                                                                Medical Branch

 

                POCT GLUCOSE (AUTOMATED) 2022 13:48:00 Yo Law Un

[facility] of 

[place]



                                                                Medical Branch

 

                POCT GLUCOSE (AUTOMATED) 2022 10:42:00 Yo Law Un

[facility] of 

[place]



                                                                Medical Branch

 

                POCT GLUCOSE (AUTOMATED) 2022 10:42:00 Yo Law Un

[facility] 

[place]



                                                                Medical Branch

 

                POCT GLUCOSE (AUTOMATED) 2022 10:42:00 Yo Law Un

iversity [facility]

 

                BASIC METABOLIC PANEL 2022 10:14:00 [facility]



                (NA, K, CL, CO2, GLUCOSE,                                 Medica

l Branch



                BUN, CREATININE, CA)                                 

 

                MAGNESIUM       2022 10:14:00 [facility]

 

                MAGNESIUM       2022 10:14:00 [facility]

 

                BASIC METABOLIC PANEL 2022 10:14:00 [facility]



                (NA, K, CL, CO2, GLUCOSE,                                 Medica

l Branch



                BUN, CREATININE, CA)                                 

 

                MAGNESIUM       2022 10:14:00 [facility]

 

                BASIC METABOLIC PANEL 2022 10:14:00 [facility]



                (NA, K, CL, CO2, GLUCOSE,                                 Medica

l Branch



                BUN, CREATININE, CA)                                 

 

                POCT GLUCOSE (AUTOMATED) 2022 09:30:00 Yo Law Un

[facility]

 

                POCT GLUCOSE (AUTOMATED) 2022 09:30:00 Yo Law Un

iversity [facility]

 

                POCT GLUCOSE (AUTOMATED) 2022 09:30:00 Yo Law Un

iversity [facility]

 

                POCT GLUCOSE (AUTOMATED) 2022 07:11:00 Yo Law Un

iversity [facility]

 

                POCT GLUCOSE (AUTOMATED) 2022 07:11:00 Yo Law Un

iversity [facility]

 

                POCT GLUCOSE (AUTOMATED) 2022 07:11:00 Yo Law Un

iversity [facility]

 

                OSMOLALITY, SERUM OR 2022 07:07:00 Yo Law

[facility]

 

                BETA HYDROXY-BUTYRATE 2022 07:07:00 Yo Law Unive

rsgeronimo [facility]

 

                OSMOLALITY, SERUM OR 2022 07:07:00 [facility]

[facility]

 

                BETA HYDROXY-BUTYRATE 2022 07:07:00 Yo Law Unive

[facility]

 

                OSMOLALITY, SERUM OR 2022 07:07:00 Yo Law Univer

sity [facility]



                PLASMA                                          [facility]

 

                BETA HYDROXY-BUTYRATE 2022 07:07:00 Yo Law Unive

[facility]

 

                BASIC METABOLIC PANEL 2022 07:06:00 Yo Law Unive

[facility]



                (NA, K, CL, CO2, GLUCOSE,                                 Medica

l Branch



                BUN, CREATININE, CA)                                 

 

                BASIC METABOLIC PANEL 2022 07:06:00 Yo Law B Unive

[facility]



                (NA, K, CL, CO2, GLUCOSE,                                 Medica

l Branch



                BUN, CREATININE, CA)                                 

 

                BASIC METABOLIC PANEL 2022 07:06:00 Yo Law Unive

[facility]



                (NA, K, CL, CO2, GLUCOSE,                                 Medica

l Branch



                BUN, CREATININE, CA)                                 

 

                CT ABDOMEN PELVIS W 2022 05:28:46 Yo Law Univers

ity [facility]



                CONTRAST                                        [facility]

 

                CT ABDOMEN PELVIS W 2022 05:28:46 Yo Law Univers

ity [facility]



                CONTRAST                                        [facility]

 

                CT ABDOMEN PELVIS W 2022 05:28:46 Yo Law Univers

ity [facility]

 

                POCT GLUCOSE(AGE >30DAYS) 2022 05:11:00 Yo Law [facility]

[facility]

 

                POCT GLUCOSE(AGE >30DAYS) 2022 05:11:00 Yo Law [facility]

[facility]

 

                POCT GLUCOSE(AGE >30DAYS) 2022 05:11:00 Yo Law [facility]

[facility]

 

                POCT GLUCOSE (AUTOMATED) 2022 05:08:00 Yo Law Un

[facility]

 

                POCT GLUCOSE (AUTOMATED) 2022 05:08:00 Yo Law Un

[facility]

 

                POCT GLUCOSE (AUTOMATED) 2022 05:08:00 Yo Law Un

[facility]

 

                BLOOD CULTURE SCREEN 2022 04:31:00 [place]

[facility]

 

                BLOOD CULTURE WORKUP 2022 04:31:00 SalemYo B Univer

[facility]

 

                GRAM POSITIVE BLOOD 2022 04:31:00 Yo Law [facility]



                PATHOGENS DNA                                   [facility]



                PROBE-AEROBIC                                   

 

                BLOOD CULTURE SCREEN 2022 04:31:00 ThanhYo Univer

[facility]

 

                BLOOD CULTURE WORKUP 2022 04:31:00 ThanhYo Univer

[facility]

 

                GRAM POSITIVE BLOOD 2022 04:31:00 Yo Law [facility]



                PATHOGENS DNA                                   [facility]



                PROBE-AEROBIC                                   

 

                BLOOD CULTURE SCREEN 2022 04:31:00 [facility]

 

                BLOOD CULTURE WORKUP 2022 04:31:00 [facility]

 

                GRAM POSITIVE BLOOD 2022 04:31:00 Yo Law [facility]



                PATHOGENS [facility]



                PROBE-AEROBIC                                   

 

                URINALYSIS      2022 04:21:00 Yo Law [facility]

 

                URINE CULTURE   2022 04:21:00 Yo Law [facility]

 

                URINALYSIS      2022 04:21:00 Yo Law [facility]

 

                URINE CULTURE   2022 04:21:00 Yo Law [facility]

 

                URINALYSIS      2022 04:21:00 Yo Law [facility]

 

                URINE CULTURE   2022 04:21:00 Yo Law [facility]

 

                COVID-19 (ID NOW RAPID 2022 04:20:00 Yo Law Univ

[facility] [place]



                TESTING)                                        Medical Branch

 

                LAB ONLY COVID  2022 04:20:00 Yo Law [facility]

 

                COVID-19 (ID NOW RAPID 2022 04:20:00 Yo Law Univ

[facility] [place]



                TESTING)                                        Medical Branch

 

                LAB ONLY COVID  2022 04:20:00 Yo Law [facility]

 

                COVID-19 (ID NOW RAPID 2022 04:20:00 Yo Law Univ

[facility] [place]



                TESTING)                                        Medical Branch

 

                LAB ONLY COVID  2022 04:20:00 Yo Law [facility]

 

                CBC WITH DIFF   2022 04:18:00 Yo Law [facility]

 

                PROTHROMBIN TIME / INR 2022 04:18:00 Thanh Yo B [facility]

 

                BASIC METABOLIC PANEL 2022 04:18:00 Yo Law [facility]



                (NA, K, CL, CO2, GLUCOSE,                                 Medica

l Branch



                BUN, CREATININE, CA)                                 

 

                HEPATIC FUNCTION PANEL 2022 04:18:00 Yo Law Univ

[facility] [place]



                ([id number]) (ALB,T.PRO,BILI                                 Medical 

Branch



                T,BU/BC,ALT,AST,ALK PHOS)                                 

 

                LIPASE          2022 04:18:00 Yo Law [facility]

 

                ACUTE CARE VENOUS BLOOD 2022 04:18:00 Yo Law Uni

[facility] [place]



                GAS                                             [facility]

 

                LACTIC ACID WHOLE BLOOD 2022 04:18:00 Yo Law

[facility]

 

                MAGNESIUM       2022 04:18:00 Yo Law [facility]

 

                PHOSPHORUS      2022 04:18:00 Yo Law [facility]

 

                GLYCOSYLATED HEMOGLOBIN 2022 04:18:00 Yo Law Uni

[facility] [place]



                (A1C)                                           [facility]

 

                PHOSPHORUS      2022 04:18:00 Yo Law [facility]

 

                LIPASE          2022 04:18:00 Yo Lwa [facility]

 

                MAGNESIUM       2022 04:18:00 Yo Law [facility]

 

                HEPATIC FUNCTION PANEL 2022 04:18:00 Yo Law Univ

[facility] [place]



                ([id number]) (ALB,T.PRO,BILI                                 Medical 

Branch



                T,BU/BC,ALT,AST,ALK PHOS)                                 

 

                BASIC METABOLIC PANEL 2022 04:18:00 Yo Law [facility]



                (NA, K, CL, CO2, GLUCOSE,                                 Medica

l Branch



                BUN, CREATININE, CA)                                 

 

                ACUTE CARE VENOUS BLOOD 2022 04:18:00 Yo Law [facility]

 

                CBC WITH DIFF   2022 04:18:00 Yo Law [facility]

 

                GLYCOSYLATED HEMOGLOBIN 2022 04:18:00 Yo Law Uni

[facility] [place]



                (A1C)                                           [facility]

 

                PROTHROMBIN TIME / INR 2022 04:18:00 Yo Law [facility]

 

                LACTIC ACID WHOLE BLOOD 2022 04:18:00 Yo Law [facility]

 

                PHOSPHORUS      2022 04:18:00 Yo Law [facility]

 

                LIPASE          2022 04:18:00 Yo Law [facility]

 

                MAGNESIUM       2022 04:18:00 Yo Law [facility]

 

                HEPATIC FUNCTION PANEL 2022 04:18:00 Yo Law Univ

[facility] [place]



                ([id number]) (ALB,T.PRO,[facility]



                T,BU/BC,ALT,AST,ALK PHOS)                                 

 

                BASIC METABOLIC PANEL 2022 04:18:00 Yo Law [facility]



                (NA, K, CL, CO2, GLUCOSE,                                 Medica

l Branch



                BUN, CREATININE, CA)                                 

 

                ACUTE CARE VENOUS BLOOD 2022 04:18:00 oY Law [facility]

 

                CBC WITH DIFF   2022 04:18:00 Yo Law [facility]

 

                GLYCOSYLATED HEMOGLOBIN 2022 04:18:00 Yo Law Uni

[facility] [place]



                (A1C)                                           [facility]

 

                PROTHROMBIN TIME / INR 2022 04:18:00 Yo Law Univ

[facility]

 

                LACTIC ACID WHOLE BLOOD 2022 04:18:00 Yo Law Uni

versity of [facility]

 

                EMERGENCY DEPARTMENT 2022 05:01:00 Doctor Unassigned, No U

[facility] 

[place]



                DOCUMENTS                       [facility]

 

                HOSPITAL ADMISSION 2022 05:01:00 Doctor Unassigned, No Uni

versity of [facility]

 

                EMERGENCY DEPARTMENT 2022 05:01:00 Doctor Unassigned, No U

niversity of 

[facility]

 

                HOSPITAL ADMISSION 2022 05:01:00 Doctor Unassigned, No Uni

versity of [facility]

 

                EMERGENCY DEPARTMENT 2022 05:01:00 Doctor Unassigned, No U

niversity of 

[facility]

 

                HOSPITAL ADMISSION 2022 05:01:00 Doctor Unassigned, No Uni

versity of [facility]

 

                POCT GLUCOSE (AUTOMATED) 2022 19:34:00 Rachel Bailey  Uni

versity of [facility]

 

                POCT GLUCOSE (AUTOMATED) 2022 19:34:00 Rachel Bailey  Uni

versity of [facility]

 

                POCT GLUCOSE (AUTOMATED) 2022 16:39:00 Rachel Bailey  Uni

versity of [facility]

 

                POCT GLUCOSE (AUTOMATED) 2022 16:39:00 Rachel Bailey  Uni

versity of [facility]

 

                BASIC METABOLIC PANEL 2022 09:13:00 Tabitha Sunil  Univer

[facility] [place]



                (NA, K, CL, CO2, GLUCOSE,                                 Medica

l Branch



                BUN, CREATININE, CA)                                 

 

                BASIC METABOLIC PANEL 2022 09:13:00 Tabitha Sunil  Univer

[facility] [place]



                (NA, K, CL, CO2, GLUCOSE,                                 Medica

l Branch



                BUN, CREATININE, CA)                                 

 

                POCT GLUCOSE (AUTOMATED) 2022 01:45:00 Rachel Bailey  Uni

versity of [facility] [place] [facility]

 

                POCT GLUCOSE (AUTOMATED) 2022 01:45:00 Marcelle Mercy  Bella

versity of [facility] [place] [facility]

 

                POCT GLUCOSE (AUTOMATED) 2022 21:36:00 Marcelle, Mercy  Uni

versity of [facility]

 

                POCT GLUCOSE (AUTOMATED) 2022 21:36:00 Marcelle Mercy  Uni

versity of [facility]

 

                POCT GLUCOSE (AUTOMATED) 2022 16:45:00 Marcelle Mercy  Uni

versity of [facility] [place] [facility]

 

                POCT GLUCOSE (AUTOMATED) 2022 16:45:00 Marcelle Mercy  Bella

versity [facility]

 

                PHOSPHORUS      2022 13:17:00 Tabitha [facility]

 

                MAGNESIUM       2022 13:17:00 Tabitha [facility]

 

                BASIC METABOLIC PANEL 2022 13:17:00 Tabitha Sunil  Univer

[facility] [place]



                (NA, K, CL, CO2, GLUCOSE,                                 Medica

l Branch



                BUN, CREATININE, CA)                                 

 

                BASIC METABOLIC PANEL 2022 13:17:00 Tabitha Sunil  Univer

[facility] [place]



                (NA, K, CL, CO2, GLUCOSE,                                 Medica

l Branch



                BUN, CREATININE, CA)                                 

 

                MAGNESIUM       2022 13:17:00 Tabitha [facility]

 

                PHOSPHORUS      2022 13:17:00 Tabitha [facility]

 

                POCT GLUCOSE (AUTOMATED) 2022 12:42:00 Marcelle Mercy  Uni

versity [facility]

 

                POCT GLUCOSE (AUTOMATED) 2022 12:42:00 Marcelle Mercy  Bella

versity [facility]

 

                POCT GLUCOSE (AUTOMATED) 2022 01:08:00 Marcelle Mercy  Bella

versity of [facility]

 

                POCT GLUCOSE (AUTOMATED) 2022 01:08:00 Marcelle Mercy  Bella

[facility]

 

                URINALYSIS      2022 22:52:00 Tabitha [facility]

 

                URINE CULTURE   2022 22:52:00 Tabitha [facility]

 

                URINALYSIS      2022 22:52:00 Tabitha [facility]

 

                URINE CULTURE   2022 22:52:00 Tabitha [facility]

 

                POCT GLUCOSE (AUTOMATED) 2022 21:40:00 Edsofia Mercy  Uni

versity [facility]

 

                POCT GLUCOSE (AUTOMATED) 2022 21:40:00 Edionwe Mercy  Uni

versity of [facility]

 

                POCT GLUCOSE (AUTOMATED) 2022 16:02:00 Edionwe, Mercy  Uni

versity of [facility]

 

                POCT GLUCOSE (AUTOMATED) 2022 16:02:00 Edionwe, Mercy  Uni

versity of [facility]

 

                POCT GLUCOSE (AUTOMATED) 2022 13:08:00 Edionwe Mercy  Uni

versity of [facility]

 

                POCT GLUCOSE (AUTOMATED) 2022 13:08:00 Edionwe Mercy  Uni

versity of [facility]

 

                POCT GLUCOSE (AUTOMATED) 2022 10:58:00 Edionwe, Mercy  Uni

versity of [facility]

 

                POCT GLUCOSE (AUTOMATED) 2022 10:58:00 Marcelle, Mercy  Uni

[facility]

 

                LACTIC ACID WHOLE BLOOD 2022 09:47:00 Refugio Moran [facility]

 

                LACTIC ACID WHOLE BLOOD 2022 09:47:00 Refugio Moran [facility]

 

                PHOSPHORUS      2022 09:46:00 Refugio Moran [facility]

 

                MAGNESIUM       2022 09:46:00 Refugio Moran [facility]

 

                COMP. METABOLIC PANEL 2022 09:46:00 Ref[facility]io [facility]



                ([id number]

 

                CBC WITH DIFF   2022 09:46:00 [facility]

 

                CBC WITH DIFF   2022 09:46:00 Refugio Moran [facility]

 

                COMP. METABOLIC PANEL 2022 09:46:00 Refugio [facility]



                ([id number])                                         Medical Branch

 

                MAGNESIUM       2022 09:46:00 Refugio Moran [facility]

 

                PHOSPHORUS      2022 09:46:00 Dean [facility]

 

                POCT GLUCOSE (AUTOMATED) 2022 07:47:00 Edsofia Mercy  Kuznech

[facility]

 

                POCT GLUCOSE (AUTOMATED) 2022 07:47:00 Edionwe Mercy  Uni

[facility]

 

                POCT GLUCOSE (AUTOMATED) 2022 03:40:00 Edsofia Mercy  Kuznech

[facility]

 

                POCT GLUCOSE (AUTOMATED) 2022 03:40:00 Marcelle Mercy  Uni

[facility]

 

                POCT GLUCOSE (AUTOMATED) 2022 00:43:00 Edsofia Mercy  Kuznech

[facility]

 

                POCT GLUCOSE (AUTOMATED) 2022 00:43:00 Marcelle Mercy  Kuznech

[facility]

 

                BASIC METABOLIC PANEL 2022 23:29:00 Refugio [facility]



                (NA, K, CL, CO2, GLUCOSE,                                 Medica

l Branch



                BUN, CREATININE, CA)                                 

 

                BASIC METABOLIC PANEL 2022 23:29:00 Refugio [facility]



                (NA, K, CL, CO2, GLUCOSE,                                 Medica

l Branch



                BUN, CREATININE, CA)                                 

 

                POCT GLUCOSE (AUTOMATED) 2022 22:28:00 Marcelle Mercy  Kuznech

[facility]

 

                POCT GLUCOSE (AUTOMATED) 2022 22:28:00 Marcelle Mercy  Kuznech

[facility]

 

                US RETROPERITONEAL 2022 22:27:00 Refugio Moran [facility]

 

                US RETROPERITONEAL 2022 22:27:00 Refugio Moran [facility]

 

                CT ABDOMEN PELVIS WO 2022 20:05:00 Refugio Moran [facility]



                CONTRAST                                        [facility]

 

                CT ABDOMEN PELVIS WO 2022 20:05:00 Refugio Moran [facility]



                CONTRAST                                        [facility]

 

                POCT GLUCOSE (AUTOMATED) 2022 19:07:00 Marcelle Mercy  Kuznech

[facility]

 

                POCT GLUCOSE (AUTOMATED) 2022 19:07:00 Marcelle Mercy  Kuznech

[facility]

 

                POCT GLUCOSE (AUTOMATED) 2022 18:00:00 Marcelle Mercy  Kuznech

[facility]

 

                POCT GLUCOSE (AUTOMATED) 2022 18:00:00 Marcelle Mercy  Kuznech

[facility]

 

                PROTEIN CREAT RATIO URINE 2022 17:45:00 Corby Peña 

[facility]



                RANDOM                                          [facility]

 

                PROTEIN CREAT RATIO URINE 2022 17:45:00 Corby Peña 

[facility]



                RANDOM                                          [facility]

 

                POCT GLUCOSE (AUTOMATED) 2022 17:25:00 Marcelle Mercy  Kuznech

[facility]

 

                POCT GLUCOSE (AUTOMATED) 2022 17:25:00 Marcelle Mercy  Kuznech

[facility]

 

                LACTIC ACID WHOLE BLOOD 2022 17:23:00 Refugio Moran [facility]

 

                LACTIC ACID WHOLE BLOOD 2022 17:23:00 Refugio Moran [facility]

 

                BASIC METABOLIC PANEL 2022 17:22:00 Refugio [facility]



                (NA, K, CL, CO2, GLUCOSE,                                 Medica

l Branch



                BUN, CREATININE, CA)                                 

 

                BASIC METABOLIC PANEL 2022 17:22:00 Refugio [facility]



                (NA, K, CL, CO2, GLUCOSE,                                 Medica

l Branch



                BUN, CREATININE, CA)                                 

 

                POCT GLUCOSE (AUTOMATED) 2022 16:07:00 Marcelle Mercy  Kuznech

[facility]

 

                POCT GLUCOSE (AUTOMATED) 2022 16:07:00 Marcelle Mercy  Kuznech

[facility]

 

                POCT GL[facility]COSE (AUTOMATED) 2022 [facility]:04:00 [facility]

 

                POCT GLUCOSE (AUTOMATED) 2022 15:04:00 Marcelle Mercy  [facility]

 

                POCT GLUCOSE (AUTOMATED) 2022 13:57:00 Marcelle Mercy  [facility]

 

                POCT GLUCOSE (AUTOMATED) 2022 13:57:00 Marcelle Mercy  [facility]

 

                URINE CULTURE   2022 13:50:00 [facility]

 

                URINE CULTURE   2022 13:50:00 [facility]

 

                LACTIC ACID WHOLE BLOOD 2022 12:57:00 Marcelle [facility]

 

                LACTIC ACID WHOLE BLOOD 2022 12:57:00 Marcelle [facility]

 

                POCT GLUCOSE (AUTOMATED) 2022 12:55:00 Marcelle Mercy  [facility]

 

                POCT GLUCOSE (AUTOMATED) 2022 12:55:00 Marcelle Mercy  [facility]

 

                BASIC METABOLIC PANEL 2022 12:51:00 Marcelle [facility]



                (NA, K, CL, CO2, GLUCOSE,                                 Medica

l Branch



                BUN, CREATININE, CA)                                 

 

                BASIC METABOLIC PANEL 2022 12:51:00 Marcelle [facility]



                (NA, K, CL, CO2, GLUCOSE,                                 Medica

l Branch



                BUN, CREATININE, CA)                                 

 

                MRSA / MSSA SCREEN BY 2022 10:37:00 Edsofia [facility]

 

                MRSA / MSSA SCREEN BY 2022 10:37:00 Edsofia, [facility]



                PCR, [facility]

 

                URINE DRUG (IMMUNOASSAY) 2022 10:36:00 Marcelle Mercy  Kuznech

[facility]



                - COMPREHENSIVE DRUG                                 [facility]



                SCREEN                                          

 

                LACTIC ACID WHOLE BLOOD 2022 10:36:00 Marcelle [facility]

 

                URINE DRUG (LCMSMS) - 2022 10:36:00 Marcelle [facility]



                SYNTHETIC OPIATES PANEL                                 [facility]

 

                LACTIC ACID WHOLE BLOOD 2022 10:36:00 Marcelle [facility]

 

                URINE DRUG (IMMUNOASSAY) 2022 10:36:00 Marcelle [facility]



                SCREEN                                          

 

                URINE DRUG (LCMSMS) - 2022 10:36:00 Marcelle [facility]



                OPIATES PANEL                                   Medical Branch

 

                PHOSPHORUS      2022 08:58:00 Edsofia [facility]

 

                MAGNESIUM       2022 [facility]58[facility]

 

                BETA HYDROXY-BUTYRATE 2022 08:58:00 Ashly [facility]

 

                BASIC METABOLIC PANEL 2022 08:58:00 [facility]



                (NA, K, CL, CO2, GLUCOSE,                                 Medica

l Branch



                BUN, CREATININE, CA)                                 

 

                BETA HYDROXY-BUTYRATE 2022 08:58:00 Ashly Ortega  [facility]

 

                BASIC METABOLIC PANEL 2022 08:58:00 [facility]



                (NA, K, CL, CO2, GLUCOSE,                                 Medica

l Branch



                BUN, CREATININE, CA)                                 

 

                MAGNESIUM       2022 08:58:00 Marcelle [facility]

 

                PHOSPHORUS      2022 08:58:00 Marcelle [facility]

 

                CBC WITH DIFF   2022 06:40:00 Marcelle [facility]

 

                LACTIC ACID WHOLE BLOOD 2022 06:40:00 Marcelle [facility]

 

                LACTIC ACID WHOLE BLOOD 2022 06:40:00 Marcelle [facility]

 

                CBC WITH DIFF   2022 06:40[facility]

 

                POCT GLUCOSE (AUTOMATED) 2022 05:14:00 Edionwe, Mercy  Uni

[facility]

 

                POCT GLUCOSE (AUTOMATED) 2022 05:14:00 Marcelle, Mercy  Uni

[facility]

 

                ED BLADDER      2022 04:35:00 Ashly Ortega  [facility]

 

                ED BLADDER      2022 04:35:00 Ashly Ortega  [facility]

 

                POCT GLUCOSE (AUTOMATED) 2022 03:51:00 Marcelle, Mercy  [facility]

 

                POCT GLUCOSE (AUTOMATED) 2022 03:51:00 MarcelleSwapnay  Bella

[facility]

 

                XR CHEST 1 VW   2022 02:43:07 Ashly Ortega  [facility]

 

                XR ANKLE 3+ VW LEFT 2022 02:43:07 Ashly Ortega  [facility]

 

                XR CHEST 1 VW   2022 02:43:07 Ashly Ortega  [facility]

 

                XR ANKLE 3+ VW LEFT 2022 02:43:07 Ashly Ortega  [facility]

 

                LACTIC ACID WHOLE BLOOD 2022 02:30:00 Ashly Ortega  [facility]

 

                LACTIC ACID WHOLE BLOOD 2022 02:30:00 Ashly Ortega  [facility]

 

                URINALYSIS      2022 01:38:00 Ashly Ortega  [facility]

 

                URINALYSIS      2022 01:38:00 Ashly Ortega  [facility]

 

                POCT GLUCOSE (AUTOMATED) 2022 01:36:00 Ashly Ortega  [facility]

 

                POCT GLUCOSE (AUTOMATED) 2022 01:36:00 Ashly Ortega  [facility]

 

                PHOSPHORUS      2022 01:24:00 Ashly Ortega  [facility]

 

                LIPASE          2022 01:24:00 Ashly Ortega  [facility]

 

                MAGNESIUM       2022 01:24:00 Ashly Ortega  [facility]

 

                TROPONIN I      2022 01:24:00 Ashly Ortega  [facility]

 

                COMP. METABOLIC PANEL 2022 01:24:00 [facility]ly [facility]  [facility]



                ([id number])                                         [facility]

 

                SALICYLATE      2022 01:24:00 Ashly Ortega  [facility]

 

                ETHANOL         2022 01:24:00 Ashly Ortega  [facility]

 

                COMP. METABOLIC PANEL 2022 01:24:00 Ashly Ortega  [facility]



                ([id number])                                         [facility]

 

                LIPASE          2022 01:24:00 Ashly Ortega  [facility]

 

                TROPONIN I      2022 01:24:00 Ashly Ortega  [facility]

 

                ETHANOL         2022 01:24:00 Ashly Ortega  [facility]

 

                ACETAMINOPHEN   2022 01:24:00 Ashly Ortega  [facility]

 

                PHOSPHORUS      2022 01:24:00 Ashly Ortega  [facility]

 

                MAGNESIUM       2022 01:24:00 Aslhy Ortega  [facility]

 

                CT LUMBAR SPINE WO 2022 01:16:52 Ashly Ortega  [facility]



                CONTRAST                                        [facility]

 

                CT THORACIC SPINE WO 2022 01:16:52 Ashly Ortega  [facility]



                CONTRAST                                        [facility]

 

                CT THORACIC SPINE WO 2022 01:16:52 Ashly Ortega  [facility]



                CONTRAST                                        [facility]

 

                CT LUMBAR SPINE WO 2022 01:16:52 Ashly Ortega  [facility]



                CONTRAST                                        [facility]

 

                CT CERVICAL SPINE WO 2022 01:16:24 Ashly Ortega  [facility]



                CONTRAST                                        [facility]

 

                CT HEAD WO CONTRAST 2022 01:16:24 Ashly Ortega  [facility]

 

                CT HEAD WO CONTRAST 2022 01:16:24 Ashly Ortega  [facility]

 

                CT CERVICAL SPINE WO 2022 01:16:24 Ashly Ortega  [facility]



                CONTRAST                                        [facility]

 

                BLOOD CULTURE SCREEN 2022 00:41:00 Ashly Ortega  [facility]

 

                BLOOD CULTURE SCREEN 2022 00:41:00 Ashly Ortega  [facility]

 

                COVID-19 (ID NOW RAPID 2022 00:30:00 Ashly Ortega  [facility]



                TESTING)                                        Medical Branch

 

                LAB ONLY COVID  2022 00:30:00 Ashly Ortega  [facility]

 

                COVID-19 (ID NOW RAPID 2022 00:30:00 Ashly Ortega  [facility]



                TESTING)                                        [facility]

 

                LAB ONLY COVID  2022 00:30:00 Ashly Ortega  [facility]

 

                POCT GLUCOSE (AUTOMATED) 2022 00:05:00 Ashly Ortega  [facility]

 

                POCT GLUCOSE (AUTOMATED) 2022 00:05:00 Ashly Ortega  [facility]

 

                ACUTE CARE VENOUS BLOOD 2022 23:53:00 Ashly Ortega  [facility]

 

                ACUTE CARE VENOUS BLOOD 2022 23:53:00 Ashly Ortega  [facility]

 

                CBC WITH DIFF   2022 23:48:00 Ashly Ortega  [facility]

 

                CBC WITH DIFF   2022 23:48:00 Ashly Ortega  [facility]

 

                LACTIC ACID WHOLE BLOOD 2022 23:47:00 Ashly Ortega  [facility]

 

                LACTIC ACID WHOLE BLOOD 2022 23:47:00 Ashly Ortega  [facility]

 

                HB ECG ROUTINE & RHYTHM 2022 23:31:40 Ashly Ortega  [facility]

 

                HB ECG ROUTINE & RHYTHM 2022 23:31:40 Ashly Ortega  [facility]

 

                EMERGENCY DEPARTMENT 2022 05:01:00 Doctor Unassigned, No U

[facility]



                DOCUMENTS                       [facility]

 

                EMERGENCY DEPARTMENT 2022 05:01:00 Doctor Unassigned, No U

[facility]



                [facility]                       [facility]

 

                HOSPITAL ADMISSION 2022 05:01:00 Doctor [facility]assigned, [facility]

 

                [facility] LEFT W CONTRAST 2022 02:34:07 [facility]inder [facility]

[facility]

 

                [facility] FEM[facility] LEFT W CONTRAST 2022 02:34:07 [facility]inder Sol [facility]

[facility]

 

                POCT GLUCOSE(AGE >30DAYS) 2022 01:30:00 [facility]inder [facility]

[facility]

 

                POCT GLUCOSE(AGE >30DAYS) 2022 01:30:00 [facility]

 

                POCT GLUCOSE (AUTOMATED) 2022 01:28:00 Alondra Santos [facility]

 

                POCT GLUCOSE (AUTOMATED) 2022 01:28:00 Alondra Santos [facility]

 

                POCT GLUCOSE (AUTOMATED) 2022 00:38:00 Alondra Santos [facility]

 

                POCT GLUCOSE (AUTOMATED) 2022 00:38:00 Alondra Santos [facility]

 

                URINALYSIS      2022-07-15 23:58:00 Tom [facility]

 

                URINALYSIS      2022-07-15 23:58:00 Alondra Santos [facility]

 

                POCT GLUCOSE (AUTOMATED) 2022-07-15 23:26:00 Alondra Santos [facility]

 

                POCT GLUCOSE (AUTOMATED) 2022-07-15 23:26:00 Alondra Santos [facility]

 

                BASIC METABOLIC PANEL 2022-07-15 22:24:00 Alondra Santos [facility]



                (NA, K, CL, CO2, GLUCOSE,                                 Medica

l Branch



                BUN, CREATININE, CA)                                 

 

                CBC WITH DIFF   2022-07-15 22:24:00 Alondra Santos [facility]

 

                COVID-19 (ID NOW RAPID 2022-07-15 22:24:00 Alondra Santos [facility]



                TESTING)                                        Medical Branch

 

                CBC WITH DIFF   2022-07-15 22:24:00 Alondra Santos [facility]



                                                                Medical [place]

 

                BASIC METABOLIC PANEL 2022-07-15 22:24:00 Alondra Santos [facility]



                (NA, K, CL, CO2, GLUCOSE,                                 Medica

l Branch



                BUN, CREATININE, CA)                                 

 

                COVID-19 (ID NOW RAPID 2022-07-15 22:24:00 Alondra Santos [facility]



                TESTING)                                        Medical Branch

 

                LAB ONLY COVID  2022-07-15 22:24:00 Tom [facility]



                INTERPRETATION                                  [facility]

 

                EMERGENCY SERVICES 2022-07-15 05:01:00 Doctor Unassigned, No Uni

[facility]



                AGREEMENTS AND                  Name            [facility]



                AUTHORIZATIONS                                  

 

                EMERGENCY DEPARTMENT 2022-07-15 05:01:00 Doctor Unassigned, No U

[facility]



                DOCUMENTS                       Name            Medical Branch

 

                LIPASE          2022 10:37:00 Radha Fofana [facility]

 

                COMP. METABOLIC PANEL 2022 10:37:00 Radha Fofana [facility]



                ([id number])                                         Medical Branch

 

                CBC WITH DIFF   2022 10:37:00 Brigido Radha [facility]

 

                AC PANEL 21 + LACTIC ACID 2022 10:37:00 Radha Fofana

[facility]

 

                CBC WITH DIFF   2022 10:37:00 Radha Fofana [facility]

 

                COMP. METABOLIC PANEL 2022 10:37:00 Radha Fofana [facility]



                ([id number])                                         [facility]

 

                AC PANEL 21 + LACTIC ACID 2022 10:37:00 Radha Fofana

[facility]

 

                LIPASE          2022 10:37:00 Radha Fofana [facility]

 

                URINALYSIS      2022 10:24:00 Brigido Radha [facility]

 

                URINALYSIS      2022 10:24:00 Radha Fofana [facility]

 

                EMERGENCY SERVICES 2022 05:01:00 Doctor Unassigned, No Uni

[facility] [place]



                AGREEMENTS AND                  Name            Medical Branch



                AUTHORIZATIONS                                  

 

                POCT GLUCOSE (AUTOMATED) 2022 22:53:00 Rdaha Fofana Uni

[facility]

 

                POCT GLUCOSE (AUTOMATED) 2022 22:53:00 Radha Fofana Uni

[facility]

 

                POCT GLUCOSE (AUTOMATED) 2022 12:17:00 Radha Fofana Uni

[facility]



                                                                [facility]

 

                POCT GLUCOSE (AUTOMATED) 2022 12:17:00 Radha Fofana Uni

[facility]

 

                POCT GLUCOSE (AUTOMATED) 2022 04:47:00 Radha Fofana Uni

[facility]

 

                POCT GLUCOSE (AUTOMATED) 2022 04:47:00 Radha Fofana Uni

[facility]

 

                POCT GLUCOSE (AUTOMATED) 2022 01:43:00 Radha Fofana Uni

[facility]

 

                POCT GLUCOSE (AUTOMATED) 2022 01:43:00 Radha Fofana Uni

[facility]

 

                POCT GLUCOSE (AUTOMATED) 2022 21:51:00 Radha Fofana Uni

[facility]

 

                POCT GLUCOSE (AUTOMATED) 2022 21:51:00 Radha Fofana Uni

[facility]

 

                POCT GLUCOSE (AUTOMATED) 2022 17:04:00 Radha Fofana Uni

[facility]

 

                POCT GLUCOSE (AUTOMATED) 2022 17:04:00 Radha Fofana Uni

[facility]

 

                POCT GLUCOSE (AUTOMATED) 2022 13:09:00 Radha Fofana Uni

[facility]

 

                POCT GLUCOSE (AUTOMATED) 2022 13:09:00 Radha Fofana Uni

[facility]

 

                POCT GLUCOSE (AUTOMATED) 2022 08:32:00 Radha Fofana Uni

[facility] [place]



                                                                Medical [facility]

 

                POCT GLUCOSE (AUTOMATED) 2022 08:32:00 Radha Fofana Uni

[facility]

 

                POCT GLUCOSE (AUTOMATED) 2022 05:45:00 Radha Fofana Uni

[facility]

 

                POCT GLUCOSE (AUTOMATED) 2022 05:45:00 Radha Fofana

[facility]

 

                POCT GLUCOSE (AUTOMATED) 2022 02:43:00 Radha Fofana

[facility]

 

                POCT GLUCOSE (AUTOMATED) 2022 02:43:00 Radha Fofana

[facility]

 

                POCT GLUCOSE (AUTOMATED) 2022 22:37:00 Radha Fofana

[facility]

 

                POCT GLUCOSE (AUTOMATED) 2022 22:37:00 Radha Fofana

[facility]

 

                POCT GLUCOSE (AUTOMATED) 2022 18:04:00 Radha Fofana

[facility]

 

                POCT GLUCOSE (AUTOMATED) 2022 18:04:00 Radha Fofana

[facility]

 

                BASIC METABOLIC PANEL 2022 14:22:00 Alan Noe    [facility]

[facility] [place]



                (NA, K, CL, CO2, GLUCOSE,                                 Medica

l Branch



                BUN, CREATININE, CA)                                 

 

                BASIC METABOLIC PANEL 2022 14:22:00 [facility]



                (NA, K, CL, CO2, GLUCOSE,                                 Medica

l Branch



                BUN, CREATININE, CA)                                 

 

                POCT GLUCOSE (AUTOMATED) 2022 13:30:00 Radha Fofana

[facility]

 

                POCT GLUCOSE (AUTOMATED) 2022 13:30:00 Radha Fofana

[facility]

 

                CRITICAL CARE   2022 11:11:00 Radha Fofana [facility]

 

                CRITICAL CARE   2022 11:11:00 Radha Fofana [facility]

 

                POCT GLUCOSE (AUTOMATED) 2022 11:01:00 Radha Fofana

[facility]

 

                POCT GLUCOSE (AUTOMATED) 2022 11:01:00 Radha Fofana

[facility]

 

                POCT GLUCOSE (AUTOMATED) 2022 07:19:00 Radha Fofana

[facility]

 

                POCT GLUCOSE (AUTOMATED) 2022 07:19:00 [facility]

 

                BASIC METABOLIC PANEL 2022 05:53:00 Radha [facility]



                (NA, K, CL, CO2, GLUCOSE,                                 Medica

l Branch



                BUN, CREATININE, CA)                                 

 

                BASIC METABOLIC PANEL 2022 05:53:00 Radha [facility]ana [facility]



                (NA, K, CL, CO2, GLUCOSE,                                 Medica

l Branch



                BUN, CREATININE, CA)                                 

 

                POCT GLUCOSE (AUTOMATED) 2022 04:41:00 Radha Fofana [facility]

 

                POCT GLUCOSE (AUTOMATED) 2022 04:41:00 Radha Fofana [facility]

 

                URINALYSIS      2022 03:59:00 Radha Fofana [facility]

 

                URINE CULTURE   2022 03:59:00 Radha Fofana [facility]

 

                URINALYSIS      2022 03:59:00 Radha Fofana [facility]

 

                URINE CULTURE   2022 03:59:00 Radha Fofana [facility]

 

                POCT GLUCOSE (AUTOMATED) 2022 03:48:00 Radha Fofana [facility]

 

                POCT GLUCOSE (AUTOMATED) 2022 03:48:00 Radha Fofana [facility]

 

                AC PANEL 21 + LACTIC ACID 2022 02:23:00 Radha Fofana [facility]

 

                AC PANEL 21 + LACTIC ACID 2022 02:23:00 Radha Fofana [facility]

 

                LIPASE          2022 02:22:00 Radha Fofana [facility]

 

                COMP. METABOLIC PANEL 2022 02:22:00 Radha F[facility]ana [facility]



                ([id number])                                         Medical [facility]

 

                CBC WITH DIFF   2022 02:22:00 Radha [facility] [facility]

 

                COVID-19 (ID NOW RAPID 2022 02:22:00 Radha Fofana [facility]



                TESTING)                                        Medical Branch

 

                LAB ONLY COVID  2022 02:22:00 Radha Fofana [facility]



                INTERPRETATION                                  [facility]

 

                CBC WITH DIFF   2022 02:22:00 Radha Fofana [facility]

 

                COMP. METABOLIC PANEL 2022 02:22:00 Radha [facility]ofana [facility]



                ([id number])                                         Medical Branch

 

                LIPASE          2022 02:22:00 Radha Fofana [facility]

 

                COVID-19 (ID NOW RAPID 2022 02:22:00 Radha Fofana [facility]



                TESTING)                                        Medical Branch

 

                LAB ONLY COVID  2022 02:22:00 Radha Fofana [facility]



                INTERPRETATION                                  [facility]

 

                HOSPITAL ADMISSION 2022 05:01:00 Doctor Unassigned, No Uni

versity of [facility]

 

                EMERGENCY DEPARTMENT 2022 05:01:00 Doctor Unassigned, No U

niversity of 

[facility]

 

                HOSPITAL ADMISSION 2022 05:01:00 Doctor Unassigned, No Uni

versity of [facility]

 

                POCT GLUCOSE (AUTOMATED) 2022 17:03:00 Edwardo Lopez   Uni

versity [facility]

 

                POCT GLUCOSE (AUTOMATED) 2022 16:09:00 Edwardo [facility]   [facility]

[facility]

 

                HEPATIC FUNCTION PANEL 2022 09:01:00 Ashly Lees [facility]



                ([id number]) (ALB,T.PRO,[facility]



                T,BU/BC,ALT,AST,ALK PHOS)                                 

 

                BASIC METABOLIC PANEL 2022 09:01:00 Ashly Lees [facility]



                (NA, K, CL, CO2, GLUCOSE,                                 Medica

l Branch



                BUN, CREATININE, CA)                                 

 

                CBC WITH DIFF   2022 09:01:00 Ashly Lees [facility]

 

                CBC WITH DIFF   2022 09:01:00 Ashly Lees [facility]

 

                BASIC METABOLIC PANEL 2022 09:01:00 Ashly Lees [facility]



                (NA, K, CL, CO2, GLUCOSE,                                 Medica

l Branch



                BUN, CREATININE, CA)                                 

 

                HEPATIC FUNCTION PANEL 2022 09:01:00 Ashly Lees [facility]



                ([id number]) (ALB,T.PRO,[facility]



                T,BU/BC,ALT,AST,ALK PHOS)                                 

 

                POCT GLUCOSE (AUTOMATED) 2022 08:58:00 Edwardo Lopez   [facility]

 

                POCT GLUCOSE (AUTOMATED) 2022 06:53:00 Edwardo Lopez   [facility]

 

                POCT GLUCOSE (AUTOMATED) 2022 05:31:00 Edwardo Lopez   [facility]

 

                POCT GLUCOSE (AUTOMATED) 2022 02:16:00 Edwardo Lopez   [facility]

 

                POCT GLUCOSE (AUTOMATED) 2022 21:50:00 Edwardo Lopez

[facility]

 

                BASIC METABOLIC PANEL 2022 20:45:00 [facility]



                (NA, K, CL, CO2, GLUCOSE,                                 Medica

l Branch



                BUN, CREATININE, CA)                                 

 

                CBC WITH DIFF   2022 20:45:00 [facility]

 

                BASIC METABOLIC PANEL 2022 20:45:00 [facility]



                (NA, K, CL, CO2, GLUCOSE,                                 Medica

l Branch



                BUN, CREATININE, CA)                                 

 

                CBC WITH DIFF   2022 20:45:00 [facility]

 

                POCT GLUCOSE (AUTOMATED) 2022 16:44:00 Edwardo Lopez   [facility]

 

                POCT GLUCOSE (AUTOMATED) 2022 13:41:00 Edwardo Lopez   [facility]

 

                URINE CULTURE   2022 06:41:00 Josse Giles [facility]

 

                URINE CULTURE   2022 06:41:00 Josse Giles [facility]

 

                POCT GLUCOSE (AUTOMATED) 2022 06:10:00 Josse Giles [facility]

 

                CT ABDOMEN PELVIS W 2022 05:08:00 Josse Giles [facility]

 

                CT ABDOMEN PELVIS W 2022 05:08:00 Josse Giles [facility]

 

                URINALYSIS      2022 04:34:00 Josse Giles [facility]

 

                COVID-19 (ID NOW RAPID 2022 04:34:00 Josse Giles [facility]



                TESTING)                                        Medical Branch

 

                LAB ONLY COVID  2022 04:34:00 Josse Giles [facility]

 

                URINALYSIS      2022 04:34:00 Josse Giles [facility]

 

                COVID-19 (ID NOW RAPID 2022 04:34:00 Josse Giles [facility]



                TESTING)                                        Medical Branch

 

                LAB ONLY COVID  2022 04:34:00 Josse Giles [facility]



                INTERPRETATION                                  [facility]

 

                LIPASE          2022 03:57:00 Josse Giles [facility]

 

                COMP. METABOLIC PANEL 2022 03:57:00 Josse Giles [facility]



                ([id number])                                         [facility]

 

                CBC WITH DIFF   2022 03:57:00 Josse Giles [facility]

 

                CBC WITH DIFF   2022 03:57:00 Josse Giles [facility]

 

                COMP. METABOLIC PANEL 2022 03:57:00 Josse Giles [facility]



                ([id number])                                         [facility]

 

                LIPASE          2022 03:57:00 Josse Giles [facility]

 

                EMERGENCY DEPARTMENT 2022 05:01:00 Doctor Unassigned, No U

[facility]



                DOCUMENTS                       [facility]

 

                HOSPITAL ADMISSION 2022 05:01:00 Doctor Unassigned, No Uni

[facility]

 

                POCT GLUCOSE (AUTOMATED) 2022-06-10 21:48:00 Rachel Bailey  Uni

versity of [facility]

 

                POCT GLUCOSE (AUTOMATED) 2022-06-10 17:03:00 Rachel Bailey  Uni

[facility]ity of [facility]

 

                POCT GLUCOSE (AUTOMATED) 2022-06-10 17:03:00 Rachel Bailey  Uni

versity [facility]

 

                POCT GLUCOSE (AUTOMATED) 2022-06-10 12:44:00 Rachel [facility]  [facility]

[facility]

 

                POCT GLUCOSE (AUTOMATED) 2022-06-10 12:44:00 EdRachel black  Uni

[facility] of [facility]

 

                BASIC METABOLIC PANEL 2022-06-10 10:29:00 [facility]o [facility]   [facility]



                (NA, K, CL, CO2, GLUCOSE,                                 Medica

l Branch



                BUN, CREATININE, CA)                                 

 

                CBC WITH DIFF   2022-06-10 10:29:00 [facility]

 

                MAGNESIUM       2022-06-10 10:29:00 [facility]

 

                MAGNESIUM       2022-06-10 10:29:00 [facility]

 

                BASIC METABOLIC PANEL 2022-06-10 10:29:00 Edwardo Lopez   [facility]



                (NA, K, CL, CO2, GLUCOSE,                                 Medica

l Branch



                BUN, CREATININE, CA)                                 

 

                CBC WITH DIFF   2022-06-10 10:29:00 [facility]

 

                POCT GLUCOSE (AUTOMATED) 2022-06-10 10:28:00 Marcelle Mercy  Uni

[facility]

 

                POCT GLUCOSE (AUTOMATED) 2022-06-10 10:28:00 Edionshraddha Mercy  Uni

[facility]

 

                POCT GLUCOSE (AUTOMATED) 2022-06-10 05:33:00 Edionwe Mercy  Uni

[facility] [place]



                                                                Medical [place]

 

                POCT GLUCOSE (AUTOMATED) 2022-06-10 05:33:00 Edpapowe Mercy  Uni

[facility] of [facility]

 

                POCT GLUCOSE (AUTOMATED) 2022-06-10 04:37:00 Edionwe Mercy  Uni

[facility] [place]



                                                                Medical Branch

 

                POCT GLUCOSE (AUTOMATED) 2022-06-10 04:37:00 Edionwe, Mercy  Uni

[facility] [place]



                                                                Medical Branch

 

                POCT GLUCOSE (AUTOMATED) 2022-06-10 02:29:00 Edionwe Mercy  Uni

[facility] [place]



                                                                Medical Branch

 

                POCT GLUCOSE (AUTOMATED) 2022-06-10 02:29:00 Edionwe, Mercy  Uni

[facility] [place]



                                                                Medical Branch

 

                POCT GLUCOSE (AUTOMATED) 2022-06-10 00:52:00 Edionwe Mercy  Uni

[facility]

 

                POCT GLUCOSE (AUTOMATED) 2022-06-10 00:52:00 Edionwe, Mercy  Uni

[facility]

 

                POCT GLUCOSE (AUTOMATED) 2022 21:52:00 [facility]

[facility]

 

                POCT GLUCOSE (AUTOMATED) 2022 21:52:00 Rachel Bailey

[facility]

 

                POCT GLUCOSE (AUTOMATED) 2022 16:42:00 Bishoppaposhraddha, Mercy  Uni

[facility]

 

                POCT GLUCOSE (AUTOMATED) 2022 16:42:00 Edpaposhraddha, Mercy  Uni

[facility]

 

                XR CHEST 1 VW   2022 14:05:00 [facility]

 

                XR SKULL <4 VW  2022 14:05:00 [facility]

 

                XR ABDOMEN 2 VW 2022 14:05:00 [facility]

 

                XR ABDOMEN 2 VW 2022 14:05:00 [facility]

 

                XR CHEST 1 VW   2022 14:05:00 [facility]

 

                XR SKULL <4 VW  2022 14:05:00 [facility]

 

                POCT GLUCOSE (AUTOMATED) 2022 12:47:00 Bishoppaposhraddha, Mercy  Uni

[facility]

 

                POCT GLUCOSE (AUTOMATED) 2022 12:47:00 Bishoppaposhraddha, Mercy  Uni

[facility]

 

                POCT GLUCOSE (AUTOMATED) 2022 08:59:00 Marcelle, Mercy  Uni

[facility]

 

                POCT GLUCOSE (AUTOMATED) 2022 08:59:00 Marcelle, Mercy  [facility]

 

                GLYCOSYLATED HEMOGLOBIN 2022 08:56:00 Refugio Moran [facility]



                (A1C)                                           [facility]

 

                CBC WITH DIFF   2022 08:56:00 Dean [facility]

 

                BASIC METABOLIC PANEL 2022 08:56:00 Refugio [facility]



                (NA, K, CL, CO2, GLUCOSE,                                 Medica

l Branch



                BUN, CREATININE, CA)                                 

 

                MAGNESIUM       2022 08:56:00 Refugio Moran [facility]

 

                PHOSPHORUS      2022 08:56:00 [facility]

 

                PHOSPHORUS      2022 08:56:00 [facility]

 

                MAGNESIUM       2022 08:56:00 Dean [facility]

 

                BASIC METABOLIC PANEL 2022 08:56:00 Refugio [facility]



                (NA, K, CL, CO2, GLUCOSE,                                 Medica

l Branch



                BUN, CREATININE, CA)                                 

 

                CBC WITH DIFF   2022 08:56:00 Dean [facility]

 

                GLYCOSYLATED HEMOGLOBIN 2022 08:56:00 Refugio Moran [facility]



                (A1C)                                           [facility]

 

                POCT GLUCOSE (AUTOMATED) 2022 04:27:00 Rachel Bailey  Uni

[facility]

 

                POCT GLUCOSE (AUTOMATED) 2022 04:27:00 Marcelle Mercy  Uni

[facility]

 

                POCT GLUCOSE (AUTOMATED) 2022 01:11:00 Edsofia Mercy  Uni

[facility]

 

                POCT GLUCOSE (AUTOMATED) 2022 01:11:00 Marcelle Mercy  Uni

[facility]

 

                POCT GLUCOSE (AUTOMATED) 2022 21:02:00 Edsofia Mercy  Uni

[facility]

 

                POCT GLUCOSE (AUTOMATED) 2022 21:02:00 Edsofia Mercy  Uni

[facility]

 

                POCT GLUCOSE (AUTOMATED) 2022 16:08:00 Edsofia Mercy  Uni

[facility]

 

                POCT GLUCOSE (AUTOMATED) 2022 16:08:00 Edsofia Mercy  Uni

[facility]

 

                POCT GLUCOSE (AUTOMATED) 2022 12:39:00 Edsofia Mercy  Uni

[facility]

 

                POCT GLUCOSE (AUTOMATED) 2022 12:39:00 Rachel Bailey  Uni

[facility]

 

                LACTIC ACID WHOLE BLOOD 2022 09:25:00 Shelton Cunningham [facility]

 

                CBC WITH DIFF   2022 09:25:00 Marcelle [facility]

 

                BASIC METABOLIC PANEL 2022 09:25:00 Marcelle [facility]



                (NA, K, CL, CO2, GLUCOSE,                                 Medica

l Branch



                BUN, CREATININE, CA)                                 

 

                BASIC METABOLIC PANEL 2022 09:25:00 Marcelle [facility]



                (NA, K, CL, CO2, GLUCOSE,                                 Medica

l Branch



                BUN, CREATININE, CA)                                 

 

                CBC WITH DIFF   2022 09:25:00 Marcelle [facility]

 

                LACTIC ACID WHOLE BLOOD 2022 09:25:00 Octavio [facility]

 

                POCT GLUCOSE (AUTOMATED) 2022 08:56:00 [facility]

 

                POCT GLUCOSE (AUTOMATED) 2022 08:56:00 Marcelle [facility]

 

                POCT GLUCOSE (AUTOMATED) 2022 04:51:00 Edsofia [facility]

 

                POCT GLUCOSE (AUTOMATED) 2022 04:51:00 Marcelle [facility]

 

                URINALYSIS      2022 02:47:00 Octavio [facility]

 

                URINALYSIS      2022 02:47:00 Octavio [facility]

 

                POCT GLUCOSE (AUTOMATED) 2022 [facility]:29[facility]00 [facility]

 

                POCT GLUCOSE (AUTOMATED) 2022 02:29:00 Shelton Cunningham [facility]

 

                HB ECG ROUTINE & RHYTHM 2022 00:33:38 Octavio [facility]

 

                HB ECG ROUTINE & RHYTHM 2022 00:33:38 Octavio [facility]

 

                URINALYSIS      2022 00:28:00 Octavio [facility]

 

                URINE CULTURE   2022 00:28:00 Octavio [facility]

 

                URINALYSIS      2022 00:28:00 Octavio [facility]

 

                URINE CULTURE   2022 00:28:00 Octavio [facility]

 

                CBC WITH DIFF   2022 00:25:00 Octavio [facility]

 

                COMP. METABOLIC PANEL 2022 00:25:00 [facility]



                ([id number])                                         Medical Branch

 

                LIPASE          2022 00:25:00 Octavio [facility]

 

                LACTIC ACID WHOLE BLOOD 2022 00:25:00 Octavio [facility]

 

                ACUTE CARE VENOUS BLOOD 2022 00:25:00 Octavio [facility]

 

                BLOOD CULTURE SCREEN 2022 00:25:00 Shelton Cunningham [facility]

 

                BLOOD CULTURE SCREEN 2022 00:25:00 Shelton Cunningham [facility]

 

                LIPASE          2022 00:25:00 Octavio [facility]

 

                COMP. METABOLIC PANEL 2022 00:25:00 Shelton [facility]



                ([id number])                                         [facility]

 

                ACUTE CARE VENOUS BLOOD 2022 00:25:00 Octavio [facility]

 

                CBC WITH DIFF   2022 00:25:00 Octavio [facility]

 

                LACTIC ACID WHOLE BLOOD 2022 00:25:00 Octavio [facility]

 

                EMERGENCY DEPARTMENT 2022 05:01:00 Doctor Unassigned, No U

[facility] 

[place]



                DOCUMENTS                       [facility]

 

                HOSPITAL ADMISSION 2022 05:01:00 Doctor Unassigned, No Uni

[facility]ity of [facility]

 

                CBC WITH DIFF   2022 17:57:00 Alan [facility]

 

                BASIC METABOLIC PANEL 2022 17:57:00 Alan [facility]



                (NA, K, CL, CO2, GLUCOSE,                                 Medica

l Branch



                BUN, CREATININE, CA)                                 

 

                MAGNESIUM       2022 17:57:00 [facility]

 

                MAGNESIUM       2022 17:57:00 [facility]

 

                BASIC METABOLIC PANEL 2022 17:57:00 [facility]



                (NA, K, CL, CO2, GLUCOSE,                                 Medica

l Branch



                BUN, CREATININE, CA)                                 

 

                CBC WITH DIFF   2022 17:57:00 [facility]

 

                POCT GLUCOSE (AUTOMATED) 2022 16:49:00 Terminella, Efrain U

niversity [facility]

 

                POCT GLUCOSE (AUTOMATED) 2022 16:49:00 Terminella, Efrain U

niversity of 

[facility]

 

                POCT GLUCOSE (AUTOMATED) 2022 12:56:00 Terminella, Efrain U

niversity of 

[facility]

 

                POCT GLUCOSE (AUTOMATED) 2022 12:56:00 Terminella, Efrain U

niversity of 

[facility]

 

                POCT GLUCOSE (AUTOMATED) 2022 09:41:00 Terminella, Efrain U

niversity of 

[facility]

 

                POCT GLUCOSE (AUTOMATED) 2022 09:41:00 Terminella, Efrain U

niversity of 

[facility]

 

                POCT GLUCOSE (AUTOMATED) 2022 05:08:00 Terminella, Efrain U

niversity of 

[facility]

 

                POCT GLUCOSE (AUTOMATED) 2022 05:08:00 Terminella, Efrain U

niversity of 

[facility]

 

                POCT GLUCOSE (AUTOMATED) 2022 01:26:00 Terminella, Efrain U

niversity of 

[facility]

 

                POCT GLUCOSE (AUTOMATED) 2022 01:26:00 Terminella, Efrain U

niversity of 

[facility]

 

                POCT GLUCOSE (AUTOMATED) 2022 21:41:00 Terminella, Efrain U

niversity of 

[facility]

 

                POCT GLUCOSE (AUTOMATED) 2022 21:41:00 Terminella, Efrain U

niversity of 

[facility]

 

                POCT GLUCOSE (AUTOMATED) 2022 17:07:00 Terminella, Efrain U

niversity of 

[facility]

 

                POCT GLUCOSE (AUTOMATED) 2022 17:07:00 Terminella, Efrain U

niversity of 

[facility]

 

                POCT GLUCOSE (AUTOMATED) 2022 14:01:00 Terminella, Efrain U

niversity of 

[facility]

 

                POCT GLUCOSE (AUTOMATED) 2022 14:01:00 Terminella, Efrain U

niversity of 

[facility]

 

                POCT GLUCOSE (AUTOMATED) 2022 09:18:00 Terminella, Efrain U

niversity of 

[facility]

 

                POCT GLUCOSE (AUTOMATED) 2022 09:18:00 Terminella, Efrain U

niversity of 

[facility]

 

                POCT GLUCOSE (AUTOMATED) 2022 01:43:00 Terminella, Efrain U

niversity of 

[facility]

 

                POCT GLUCOSE (AUTOMATED) 2022 01:43:00 Terminella, Efrain U

niversity of 

[facility]

 

                POCT GLUCOSE (AUTOMATED) 2022 21:24:00 Tobias Hernandez Uni

versity of [facility]

 

                POCT GLUCOSE (AUTOMATED) 2022 21:24:00 Tobias Hernandez Uni

versity of [facility]

 

                POCT GLUCOSE (AUTOMATED) 2022 16:25:00 Tobias Hernandez Uni

versity of [facility]

 

                POCT GLUCOSE (AUTOMATED) 2022 16:25:00 Tobias Hernandez Uni

versity of [facility]

 

                URINALYSIS      2022 15:46:00 Phillips, [facility]

 

                URINE CULTURE   2022 15:46:00 Phillips, [facility]

 

                URINALYSIS      2022 15:46:00 Phillips, [facility]

 

                URINE CULTURE   2022 15:46:00 Phillips, [facility]

 

                POCT GLUCOSE (AUTOMATED) 2022 15:36:00 Tobias Hernandez

versity of [facility]

 

                POCT GLUCOSE (AUTOMATED) 2022 15:36:00 Tobias Hernandez Uni

versity of [facility]

 

                POCT GLUCOSE (AUTOMATED) 2022 14:25:00 Tobias Hernandez Uni

versity of [facility]

 

                POCT GLUCOSE (AUTOMATED) 2022 14:25:00 Tobias Hernandez

versity of [facility]

 

                BASIC METABOLIC PANEL 2022 13:45:00 Tobias Hernandez [facility]

[facility] [place]



                (NA, K, CL, CO2, GLUCOSE,                                 Medica

l Branch



                BUN, CREATININE, CA)                                 

 

                BASIC METABOLIC PANEL 2022 13:45:00 Tobias Hernandez Univer

[facility] [place]



                (NA, K, CL, CO2, GLUCOSE,                                 Medica

l Branch



                BUN, CREATININE, CA)                                 

 

                POCT GLUCOSE (AUTOMATED) 2022 13:34:00 Tobias Hernandez

versity of [facility]

 

                POCT GLUCOSE (AUTOMATED) 2022 13:34:00 [facility]ias Hernandez

[facility]

 

                CRITICAL CARE   2022 13:05:05 [facility]

 

                CRITICAL CARE   2022 13:05:05 [facility]

 

                POCT GLUCOSE (AUTOMATED) 2022 12:23:00 Tobias Hernandez

versity of [facility]

 

                POCT GLUCOSE (AUTOMATED) 2022 12:23:00 Tobias Hernandez Uni

versity of [facility]

 

                POCT GLUCOSE (AUTOMATED) 2022 11:24:00 Tobias Hernandez

versity of [facility]

 

                POCT GLUCOSE (AUTOMATED) 2022 11:24:00 Tobias Hernandez

versity of [facility]

 

                POCT GLUCOSE (AUTOMATED) 2022 10:33:00 Tobias Hernandez Uni

versity of [facility]

 

                POCT GLUCOSE (AUTOMATED) 2022 10:33:00 Tobias Hernandez

versity [facility]

 

                BASIC METABOLIC PANEL 2022 09:32:00 Albustami, Tobias [facility]

[facility] [place]



                (NA, K, CL, CO2, GLUCOSE,                                 Medica

l Branch



                BUN, CREATININE, CA)                                 

 

                BASIC METABOLIC PANEL 2022 09:32:00 Yong Tobias [facility]

[facility] [place]



                (NA, K, CL, CO2, GLUCOSE,                                 Medica

l Branch



                BUN, CREATININE, CA)                                 

 

                POCT GLUCOSE (AUTOMATED) 2022 09:30:00 Tobias Hernandez Bella

[facility]

 

                POCT GLUCOSE (AUTOMATED) 2022 09:30:00 AlbTobias gupta Uni

[facility]

 

                POCT GLUCOSE (AUTOMATED) 2022 08:27:00 AlbTobias gupta Uni

[facility]

 

                POCT GLUCOSE (AUTOMATED) 2022 08:27:00 AlbTobias gupta Uni

[facility]

 

                POCT GLUCOSE (AUTOMATED) 2022 07:23:00 Tobias Hernandez Bella

[facility]

 

                POCT GLUCOSE (AUTOMATED) 2022 07:23:00 AlbTobias gupta Uni

[facility]

 

                POCT GLUCOSE (AUTOMATED) 2022 06:22:00 Tobias Hernandez Uni

[facility]

 

                POCT GLUCOSE (AUTOMATED) 2022 06:22:00 AlbTobias gupta Uni

[facility]

 

                BASIC METABOLIC PANEL 2022 05:30:00 [facility]

[facility] [place]



                (NA, K, CL, CO2, GLUCOSE,                                 Medica

l Branch



                BUN, CREATININE, CA)                                 

 

                BASIC METABOLIC PANEL 2022 05:30:00 Yong Tobias [facility]

[facility] [place]



                (NA, K, CL, CO2, GLUCOSE,                                 Medica

l Branch



                BUN, CREATININE, CA)                                 

 

                POCT GLUCOSE (AUTOMATED) 2022 05:27:00 Tobias Hernandez Uni

versity [facility]

 

                POCT GLUCOSE (AUTOMATED) 2022 05:27:00 AlbTobias gupta Uni

versity [facility]

 

                POCT GLUCOSE (AUTOMATED) 2022 04:23:00 AlbTobias gupta Uni

[facility]

 

                POCT GLUCOSE (AUTOMATED) 2022 04:23:00 Tobias Hernandez Uni

versity [facility]

 

                POCT GLUCOSE (AUTOMATED) 2022 03:19:00 AlbTobias gupta Uni

[facility]

 

                POCT GLUCOSE (AUTOMATED) 2022 03:19:00 AlbTobias gupta Uni

[facility]

 

                POCT GLUCOSE (AUTOMATED) 2022 02:24:00 AlbTobias gupta Uni

versity [facility]

 

                POCT GLUCOSE (AUTOMATED) 2022 02:24:00 Albcandy Tobias [facility]

 

                KETONES URINE   2022 01:49:00 [facility]

 

                KETONES URINE   2022 01:49:00 [facility]

 

                BETA HYDROXY-BUTYRATE 2022 01:44:00 [facility]

 

                BASIC METABOLIC PANEL 2022 01:44:00 [facility]



                (NA, K, CL, CO2, GLUCOSE,                                 Medica

l Branch



                BUN, CREATININE, CA)                                 

 

                BETA HYDROXY-BUTYRATE 2022 01:44:00 [facility]

 

                BASIC METABOLIC PANEL 2022 01:44:00 [facility]



                (NA, K, CL, CO2, GLUCOSE,                                 Medica

l Branch



                BUN, CREATININE, CA)                                 

 

                POCT GLUCOSE (AUTOMATED) 2022 01:24:00 Tobias Hernandez Uni

[facility]

 

                POCT GLUCOSE (AUTOMATED) 2022 01:24:00 Tobias Hernandez Uni

versity [facility]

 

                POCT GLUCOSE (AUTOMATED) 2022 00:18:00 AlbTobias gupta Uni

versity [facility]

 

                POCT GLUCOSE (AUTOMATED) 2022 00:18:00 AlbTobias gupta Uni

versity [facility]

 

                POCT GLUCOSE (AUTOMATED) 2022 22:56:00 AlbTobias gupta Uni

[facility]

 

                POCT GLUCOSE (AUTOMATED) 2022 22:56:00 Tobias Hernandez

[facility]

 

                BASIC METABOLIC PANEL 2022 22:03:00 Tobias Hernandez Univer

[facility] [place]



                (NA, K, CL, CO2, GLUCOSE,                                 Medica

l Branch



                BUN, CREATININE, CA)                                 

 

                BASIC METABOLIC PANEL 2022 22:03:00 Tobias Hernandez Univer

[facility] [place]



                (NA, K, CL, CO2, GLUCOSE,                                 Medica

l Branch



                BUN, CREATININE, CA)                                 

 

                POCT GLUCOSE (AUTOMATED) 2022 21:50:00 Tobias Hernandez Uni

[facility]

 

                POCT GLUCOSE (AUTOMATED) 2022 21:50:00 Tobias Hernandez

[facility]

 

                POCT GLUCOSE (AUTOMATED) 2022 20:39:00 Tobias Hernandez

[facility]

 

                POCT GLUCOSE (AUTOMATED) 2022 20:39:00 Tobias Hernandez

[facility]

 

                POCT GLUCOSE (AUTOMATED) 2022 18:58:00 AlbTobias gupta Uni

[facility]

 

                POCT GLUCOSE (AUTOMATED) 2022 18:58:00 Tobias Hernandez

[facility]

 

                BASIC METABOLIC PANEL 2022 17:46:00 Tobias Hernandez Univer

[facility] [place]



                (NA, K, CL, CO2, GLUCOSE,                                 Medica

l Branch



                BUN, CREATININE, CA)                                 

 

                BASIC METABOLIC PANEL 2022 17:46:00 Tobias Hernandez Univer

[facility] [place]



                (NA, K, CL, CO2, GLUCOSE,                                 Medica

l Branch



                BUN, CREATININE, CA)                                 

 

                POCT GLUCOSE (AUTOMATED) 2022 17:39:00 Tobias Hernandez

[facility]

 

                POCT GLUCOSE (AUTOMATED) 2022 17:39:00 AlbTobias gupta Uni

[facility]

 

                POCT GLUCOSE (AUTOMATED) 2022 16:22:00 Tobias Hernandez Uni

[facility]

 

                POCT GLUCOSE (AUTOMATED) 2022 16:22:00 YongCarinar [facility]

 

                POCT GLUCOSE (AUTOMATED) 2022 15:27:00 Albcandy Tobias [facility]

 

                POCT GLUCOSE (AUTOMATED) 2022 15:27:00 Albcandy Tobias [facility]

 

                POCT GLUCOSE (AUTOMATED) 2022 14:17:00 Albcandy Tobias [facility]

 

                POCT GLUCOSE (AUTOMATED) 2022 14:17:00 Albusttrupti [facility]

 

                CBC WITHOUT DIFF 2022 13:33:00 Albustami [facility]

 

                CBC WITHOUT DIFF 2022 13:33:00 Yong [facility]

 

                POCT GLUCOSE (AUTOMATED) 2022 13:20:00 Yong Tobias [facility]

 

                POCT GLUCOSE (AUTOMATED) 2022 13:20:00 Albcandy [facility]

 

                POCT GLUCOSE (AUTOMATED) 2022 12:13:00 Albcandy [facility]

 

                POCT GLUCOSE (AUTOMATED) 2022 12:13:00 Yong [facility]

 

                XR CHEST 1 VW   2022 11:46:06 [facility]

 

                XR CHEST 1 VW   2022 11:46:06 Yong [facility]

 

                MAGNESIUM       2022 [facility]:54:[facility]

 

                MRSA / MSSA SCREEN BY 2022 10:54:00 [facility]

[facility] [place]



                PCR, [facility]

 

                BASIC METABOLIC PANEL 2022 10:54:00 Yong Tobias [facility]

[facility] [place]



                (NA, K, CL, CO2, GLUCOSE,                                 Medica

l Branch



                BUN, CREATININE, CA)                                 

 

                OSMOLALITY, SERUM OR 2022 10:54:00 Yong Tobias [facility]



                PLASMA                                          Medical Branch

 

                PHOSPHORUS      2022 10:54:00 Yong [facility]

 

                BETA HYDROXY-BUTYRATE 2022 10:54:00 Yong [facility]

 

                PHOSPHORUS      2022 10:54:00 [facility]

 

                MAGNESIUM       2022 10:54:00 Yong [facility]

 

                OSMOLALITY, SERUM OR 2022 10:54:00 Yong Levine, Susan. \[facility].\""



                PLASMA                                          [facility]

 

                BETA HYDROXY-BUTYRATE 2022 10:54:00 [facility]

 

                BASIC METABOLIC PANEL 2022 10:54:00 [facility]



                (NA, K, CL, CO2, GLUCOSE,                                 Medica

l Branch



                BUN, CREATININE, CA)                                 

 

                MRSA / MSSA SCREEN BY 2022 10:54:00 [facility] Tobias Univer

[facility] [place]



                PCR, [facility]

 

                POCT GLUCOSE (AUTOMATED) 2022 10:53:00 Tobias Hernandez

[facility]

 

                POCT GLUCOSE (AUTOMATED) 2022 10:53:00 Tobias Hernandez

[facility]

 

                POCT GLUCOSE (AUTOMATED) 2022 08:46:00 Radha Fofana

[facility]

 

                POCT GLUCOSE (AUTOMATED) 2022 08:46:00 Radha Fofana

[facility]

 

                POCT GLUCOSE (AUTOMATED) 2022 07:39:00 Radha Fofana

versity of [facility]

 

                POCT GLUCOSE (AUTOMATED) 2022 07:39:00 Radha Fofana

versity of [facility]

 

                POCT GLUCOSE (AUTOMATED) 2022 07:02:00 Radha Fofana

versity of [facility]

 

                POCT GLUCOSE (AUTOMATED) 2022 07:02:00 Radha Fofana

versity of [facility]

 

                POCT GLUCOSE (AUTOMATED) 2022 06:17:00 Radha Fofana

versity of [facility]

 

                POCT GLUCOSE (AUTOMATED) 2022 06:17:00 Radha Fofana [facility]

 

                AC PANEL 21 + LACTIC ACID 2022 05:36:00 Rad[facility]ana [facility]

[facility]

 

                AC PANEL 21 + LACTIC ACID 2022 05:36:00 Radha Fofana Un

[facility]

 

                POCT GLUCOSE (AUTOMATED) 2022 04:58:00 Radha Fofana [facility]

 

                POCT GLUCOSE (AUTOMATED) 2022 04:58:00 Radha Fofana [facility]

 

                CT ABDOMEN PELVIS W 2022 04:33:00 Radha Fofana [facility]



                CONTRAST                                        [facility]

 

                CT ABDOMEN PELVIS W 2022 04:33:00 Radha Fofana [facility]

 

                COMP. METABOLIC PANEL 2022 04:20:00 Radha [facility]ana [facility]



                ([id number])                                         [facility]

 

                COMP. METABOLIC PANEL 2022 04:20:00 Radha F[facility]ana [facility]



                ([id number])                                         [facility]

 

                CBC WITH DIFF   2022 03:14:00 Radha Fofana [facility]

 

                BLOOD CULTURE SCREEN 2022 03:14:00 Radha Fofana [facility]

 

                BLOOD CULTURE SCREEN 2022 03:14:00 Radha Fofana [facility]

 

                CBC WITH DIFF   2022 03:14:00 Radha Fofana [facility]

 

                ACTIVATED PARTIAL 2022 03:13:00 Radha Fofana [facility]



                [facility]

 

                PROTHROMBIN TIME / INR 2022 03:13:00 Radha Fofana [facility]

 

                LIPASE          2022 03:13:00 Radha Fofana [facility]

 

                TROPONIN I      2022 03:13:00 Radha Fofana [facility]

 

                N-TERMINAL PRO-BNP 2022 03:13:00 Radha Fofana [facility]

 

                LIPASE          2022 03:13:00 Radha Fofana [facility]

 

                TROPONIN I      2022 03:13:00 Radha Fofana [place]

f [facility]

 

                PROTHROMBIN TIME / INR 2022 03:13:00 Radha Fofana [facility]

 

                ACTIVATED PARTIAL 2022 03:13:00 Radha Fofana [facility]



                THRMPLAS [facility]

 

                N-TERMINAL PRO-BNP 2022 03:13:00 Radha Fofana [facility]

 

                LACTIC ACID WHOLE BLOOD 2022 03:12:00 Radha Fofana [facility]

 

                LACTIC ACID WHOLE BLOOD 2022 03:12:00 Radha Fofana [facility]

 

                EKG-12 LEAD     2022 01:55:16 Doctor Unassigned, No Univer

sity of [facility]

 

                EKG-12 LEAD     2022 01:55:16 Doctor Unassigned, No Univer

sity of [facility]

 

                EMERGENCY DEPARTMENT 2022 05:01:00 Doctor Unassigned, No [facility]

[facility]

 

                HOSPITAL ADMISSION 2022 05:01:00 Doctor Unassigned, No Uni

versity of [facility]

 

                BASIC METABOLIC PANEL 2022 09:31:00 [facility]



                (NA, K, CL, CO2, GLUCOSE,                                 Medica

l Branch



                BUN, CREATININE, CA)                                 

 

                CBC WITH DIFF   2022 [facility]:31:00 [facility]

 

                BASIC METABOLIC PANEL 2022 09:31:00 [facility]



                (NA, K, CL, CO2, GLUCOSE,                                 Medica

l Branch



                BUN, CREATININE, CA)                                 

 

                CBC WITH DIFF   2022 09:31:00 [facility]

 

                BASIC METABOLIC PANEL 2022 08:57:00 [facility]



                (NA, K, CL, CO2, GLUCOSE,                                 Medica

l Branch



                BUN, CREATININE, CA)                                 

 

                CBC WITH DIFF   2022 08:57:00 [facility]

 

                BASIC METABOLIC PANEL 2022 08:57:00 Moose Mosquera [facility]



                (NA, K, CL, CO2, GLUCOSE,                                 Medica

l Branch



                BUN, CREATININE, CA)                                 

 

                CBC WITH DIFF   2022 08:57:00 Eveline [facility]

 

                CBC WITH DIFF   2022 10:55:00 Lea [facility]

 

                COMP. METABOLIC PANEL 2022 10:55:00 Lea [facility]



                ([id number])                                         [facility]

 

                N-TERMINAL PRO-BNP 2022 10:55:00 Lea [facility]

 

                COMP. METABOLIC PANEL 2022 10:55:00 Lea [facility]



                ([id number])                                         [facility]

 

                CBC WITH DIFF   2022 10:55:00 Lea [facility]

 

                N-TERMINAL PRO-BNP 2022 10:55:00 Lea [facility]

 

                CBC WITH DIFF   2022 11:33:00 Ashly [facility]

 

                COMP. METABOLIC PANEL 2022 11:33:00 Lea [facility]



                ([id number])                                         [facility]

 

                N-TERMINAL PRO-BNP 2022 11:33:00 Lea [facility]

 

                MAGNESIUM       2022 11:33:00 Lea [facility]

 

                MAGNESIUM       2022 11:33:00 Lea [facility]

 

                COMP. METABOLIC PANEL 2022 11:33:00 Lea [facility]



                ([id number])                                         [facility]

 

                CBC WITH DIFF   2022 11:33:00 Ashly [facility]

 

                N-TERMINAL PRO-BNP 2022 11:33:00 Lea [facility]

 

                ASPIRATE OR ABSCESS 2022 21:31:00 Coleen Cerna   [facility] [place]



                CULTURE(AEROBIC/ANAEROBIC                                 Medica

l Branch



                )                                               

 

                ASPIRATE OR ABSCESS 2022 21:31:00 Coleen Cerna   [facility]

[facility] [place]



                CULTURE(AEROBIC/ANAEROBIC                                 Medica

l Branch



                )                                               

 

                PROTEIN CREAT RATIO URINE 2022 11:11:00 Rand Dong  [facility]

 

                SODIUM, URINE RANDOM 2022 11:11:00 Rand Dong  [facility]

 

                SODIUM, URINE RANDOM 2022 11:11:00 Rand Dong  [facility]

 

                PROTEIN CREAT RATIO URINE 2022 11:11:00 Rand Dong  [facility]

[facility]

 

                XR CHEST 1 VW   2022 11:07:43 Rand Dong  [facility]

 

                XR FOOT 3+ VW LEFT 2022 11:07:43 Rand Dong  [facility]

 

                XR CHEST 1 VW   2022 11:07:43 aRnd Dong  [facility]

 

                XR FOOT 3+ VW LEFT 2022 11:07:43 Rand Dong  [facility]

 

                URINE DRUG (IMMUNOASSAY) 2022 11:07:00 Rand Dong

[facility]



                SCREEN                                          

 

                URINE DRUG (LCMSMS) - 2022 11:07:00 Rand Dong  [facility]



                OPIATES [facility]

 

                URINE DRUG (IMMUNOASSAY) 2022 11:07:00 Rand Dong

[facility] [place]



                - COMPREHENSIVE DRUG                                 [facility]



                SCREEN                                          

 

                URINE DRUG (LCMSMS) - 2022 11:07:00 Rand Dong  [facility]



                SYNTHETIC OPIATES PANEL                                 Medical 

Branch

 

                SEDIMENTATION RATE 2022 11:03:00 Rand Dong  [facility]

 

                PROCALCITONIN   2022 11:03:00 Lea reymundo  [facility]

 

                CBC WITH DIFF   2022 11:03:00 Lea reymundo  [facility]

 

                COMP. METABOLIC PANEL 2022 11:03:00 Lea reymundo  [facility]



                ([id number])                                         [facility]

 

                N-TERMINAL PRO-BNP 2022 11:03:00 Lea reymundo  [facility]

 

                MAGNESIUM       2022 11:03:00 Lea [facility]

 

                PHOSPHORUS      2022 11:03:00 Lea [facility]

 

                CREATINE KINASE 2022 11:03:00 Lea [facility]

 

                VITAMIN D, 25-OH 2022 11:03:00 Lea [facility]

 

                VITAMIN B12, LEVEL 2022 11:03:00 Lea [facility]

 

                GLYCOSYLATED HEMOGLOBIN 2022 11:03:00 Lea Adnan  Univ

[facility] [place]



                ([address]

 

                PHOSPHORUS      2022 11:03:00 Lea [facility]

 

                CREATINE KINASE 2022 11:03:00 Lea [facility]

 

                MAGNESIUM       2022 11:03:00 Lea [facility]

 

                VITAMIN B12, LEVEL 2022 11:03:00 Lea [facility]

 

                COMP. METABOLIC PANEL 2022 11:03:00 Lea reymundo  [facility]



                ([id number])                                         [facility]

 

                SEDIMENTATION RATE 2022 11:03:00 Lea [facility]

 

                CBC WITH DIFF   2022 11:03:00 Lea [facility]

 

                GLYCOSYLATED HEMOGLOBIN 2022 11:03:00 Lea Adnan  Univ

[facility] [place]



                ([address]

 

                N-TERMINAL PRO-BNP 2022 11:03:00 Lea [facility]

 

                VITAMIN D, 25-OH 2022 11:03:00 Lea [facility]

 

                PROCALCITONIN   2022 11:03:00 Rand Dong  [facility]

 

                CT ANGIOGRAM LOWER 2022 03:39:00 Radha Fofana [facility]



                EXTREMITY LEFT W CONTRAST                                 Medica

l Branch

 

                CT ANGIOGRAM LOWER 2022 03:39:00 Radha Fofana [facility]



                EXTREMITY LEFT W CONTRAST                                 Medica

l Branch

 

                CT HEAD WO CONTRAST 2022 03:25:00 Radha Fofana [facility]

 

                CT HEAD WO CONTRAST 2022 03:25:00 Radha Fofana [facility]

 

                URINALYSIS      2022 01:22:00 Radha Fofana [facility]

 

                URINE CULTURE   2022 01:22:00 Radha Fofana [facility]

 

                URINALYSIS      2022 01:22:00 Radha Fofana [facility]

 

                URINE CULTURE   2022 01:22:00 Radha Fofana [facility]

 

                CBC WITH DIFF   2022 00:58:00 Radha Fofana [facility]

 

                ACTIVATED PARTIAL 2022 00:58:00 Radha Fofana [facility]



                [facility]

 

                PROTHROMBIN TIME / INR 2022 00:58:00 Radha Fofana [facility]

 

                COMP. METABOLIC PANEL 2022 00:58:00 Radha F[facility]ana [facility]



                ([id number])                                         [facility]

 

                BLOOD CULTURE SCREEN 2022 00:58:00 Radha Fofana [facility]

 

                LACTIC ACID WHOLE BLOOD 2022 00:58:00 Radha Fofana [facility]

 

                N-TERMINAL PRO-BNP 2022 00:58:00 Rand Dong  [facility]

 

                MAGNESIUM       2022 00:58:00 Lea reymundo  [facility]

 

                PHOSPHORUS      2022 00:58:00 Lea [facility]

 

                FERRITIN SERUM  2022 00:58:00 Lea [facility]

 

                IRON PANEL      2022 00:58:00 Lea reymundo  [facility]

 

                THYROID STIMULATING 2022 00:58:00 Lea [facility]

 

                LIPID PANEL ([id number])(TOTAL 2022 00:58:00 Rand Dong  [facility]

[facility]



                CHOLESTEROL,                                    Medical Branch



                TRIGLYCERIDES, HDL)                                 

 

                BLOOD CULTURE SCREEN 2022 00:58:00 Radha Fofana [facility]

 

                PHOSPHORUS      2022 00:58:00 Lea reymundo  [facility]

 

                MAGNESIUM       2022 00:58:00 Lea [facility]

 

                FERRITIN SERUM  2022 00:58:00 Lea [facility]

 

                THYROID STIMULATING 2022 00:58:00 Lea reymundo  [facility]

 

                COMP. METABOLIC PANEL 2022 00:58:00 Radha [facility]



                ([id number])                                         Medical Branch

 

                LIPID PANEL ([id number])(TOTAL 2022 00:58:00 Rand Dong

[facility]



                CHOLESTEROL,                                    Medical Branch



                TRIGLYCERIDES, HDL)                                 

 

                IRON PANEL      2022 00:58:00 Lea [facility]

 

                CBC WITH DIFF   2022 00:58:00 Radha Fofana [facility]

 

                PROTHROMBIN TIME / INR 2022 00:58:00 Radha Fofana [facility]

 

                ACTIVATED PARTIAL 2022 00:58:00 Radha Fofana [facility]



                [facility]

 

                N-TERMINAL PRO-BNP 2022 00:58:00 Rand Dong  [facility]

 

                LACTIC ACID WHOLE BLOOD 2022 00:58:00 Radha Fofana [facility]

 

                EMERGENCY DEPARTMENT 2022-05-10 05:01:00 Doctor Unassigned, No [facility]

[facility]

 

                HOSPITAL ADM - MISC 2022-05-10 05:01:00 [facility]

 

                HOSPITAL ADMISSION 2022-05-10 05:01:00 Doctor Unassigned, No Uni

[facility]

 

                EMERGENCY DEPARTMENT 2022-05-10 05:01:00 Doctor Unassigned, No U

[facility] of 

[facility]

 

                HOSPITAL ADM - MISC 2022-05-10 05:01:00 Doctor Unassigned, No Un

iversity of 

[facility]

 

                Spinal puncture, lumbar, 2022 20:30:00                 Patience Garcia



                diagnostic; with                                 



                fluoroscopic or CT                                 



                guidance                                        

 

                MAGNESIUM       2022 04:12:00 Singer [facility]

 

                COMP. METABOLIC PANEL 2022 04:12:00 [facility]



                ([id number])                                         [facility]

 

                CBC WITH DIFF   2022 04:12:00 [facility]

 

                CT HEAD WO CONTRAST 2022 00:15:29 ALLISON Romeo [facility]

 

                URINALYSIS      2022 23:53:00 ALLISON Romeo [facility]

 

                COMP. METABOLIC PANEL 2022 23:52:00 ALLISON Romeo Elena [facility]

[facility] [place]



                ([id number])                                         [facility]

 

                CBC WITH DIFF   2022 23:52:00 ALLISON Romeo Elena [facility]

 

                XR CHEST 1 VW   2022 03:03:32 Rand Dong  [facility]

 

                COMP. METABOLIC PANEL 2022 11:57:00 [facility]



                ([id number])                                         [facility]

 

                CBC WITH DIFF   2022 11:57:00 [facility]

 

                BASIC METABOLIC PANEL 2022 12:10:00 [facility]



                (NA, K, CL, CO2, GLUCOSE,                                 Medica

l Branch



                BUN, CREATININE, CA)                                 

 

                CBC WITH DIFF   2022 12:09:00 Marcelle [facility]

 

                URINALYSIS      2022 00:40:00 Suly Luna [facility]

 

                URINE CULTURE   2022 00:40:00 Suly Luna [facility]

 

                FECES CULTURE   2022 14:58:00 Marcelle [facility]

 

                OCCULT (GUAIAC) BLOOD 2022 14:58:00 Marcelle [facility]

 

                CLOSTRIDIUM DIFFICILE 2022 14:58:00 Marcelle [facility]

 

                FECAL PATHOGENS BY PCR 2022 14:58:00 [facility]

 

                URINE DRUG (IMMUNOASSAY) 2022 10:11:00 Marcelle [facility]



                SCREEN                                          

 

                COVID-19 (ID NOW RAPID 2022 07:33:00 Silvino Garay [facility]



                TESTING)                                        Medical [place]

 

                LAB ONLY COVID  2022 07:33:00 Silvino Garay [facility]



                INTERPRETATION                                  [facility]

 

                COMP. METABOLIC PANEL 2022 06:18:00 Silvino Garay [facility]



                ([id number])                                         [facility]

 

                CBC WITH DIFF   2022 05:40:00 Silvino Garay [facility]

 

                URINALYSIS      2022 01:43:00 Alma Villaseñor [facility]

 

                LIPASE          2022 01:40:00 Alma Villaseñor [facility]

 

                COMP. METABOLIC PANEL 2022 01:40:00 Alma Villaseñor [facility]



                ([id number])                                         [facility]

 

                CBC WITH DIFF   2022 01:38:00 Alma Villaseñor [facility]

 

                XR CHEST 1 VW   2022 23:40:19 Alma Villaseñor [facility]

 

                ASSIGNMENT OF BENEFITS 2022 22:05:40 Doctor Unassigned, No

 [facility]

 

                NOTICE OF PRIVACY 2022 22:04:58 Doctor Unassigned, No [facility]

 

                CONSENT/REFUSAL FOR 2022 22:04:33 Doctor Unassigned, No Un

[facility] 

[place]



                DIAGNOSIS AND TREATMENT                 Name            Medical 

Branch

 

                URINALYSIS      2022-01-15 23:09:00 Neeta Maria [facility]

 

                BLOOD CULTURE SCREEN 2022-01-15 23:04:00 Neeta Maria [facility]

 

                D-DIMER         2022-01-15 22:49:00 Neeta Maria [facility]

 

                COVID-19 (ID NOW RAPID 2022-01-15 22:48:00 Neeta Maria [facility]

[facility] [place]



                TESTING)                                        Medical Branch

 

                COMP. METABOLIC PANEL 2022-01-15 22:17:00 Neeta Maria [facility]

[facility] [place]



                ([id number])                                         [facility]

 

                CBC WITH DIFF   2022-01-15 22:17:00 Neeta Maria [facility]

 

                XR CHEST 1 VW   2022-01-15 21:47:19 Neeta Maria [facility]

 

                COMP. METABOLIC PANEL 2021 22:20:00 Varinder Sol [facility]

[facility] [place]



                ([id number])                                         [facility]

 

                CBC WITH DIFF   2021 22:20:00 Varinder Sol [place]

[facility]

 

                CT ABDOMEN PELVIS WO 2021-05-10 00:39:40 Bossman Villa Uni

[facility] [place]



                CONTRAST                                        [facility] [place]

 

                LIPASE          2021-05-10 00:06:00 Soni Villao F [facility]

 

                COMP. METABOLIC PANEL 2021-05-10 00:06:00 Bossman Villa Un

[facility]



                ([id number])                                         [facility]

 

                CBC WITH DIFF   2021-05-10 00:06:00 IbSoni cantuo F [facility]

 

                URINALYSIS      2021-05-10 00:00:00 IbKodi cantuusho F [facility]

 

                COVID-19 (ID NOW RAPID 2021-05-10 00:00:00 IbSoni cantuo F [facility]

[facility]



                TESTING)                                        Medical Branch

 

                Cholecystectomy                                 [facility]

 

                Shunt of cerebral                                 [facility]

nn



                ventricle to extracranial                                 



                site                                            







Plan of Care







             Planned Activity Planned Date Details      Comments     Source

 

             Future Scheduled 2023-01-10   Lipid panel               [facility]

s



             Test         00:00:00     (procedure) [code =              [facility]



                                       31440887]                 

 

             Future Scheduled 2023-01-10   Lipid panel               [facility]

s



             Test         00:00:00     (procedure) [code =              [facility]



                                       46685861]                 

 

             Future Scheduled 2023-01-10   Lipid panel               [facility]

s



             Test         00:00:00     (procedure) [code =              [facility]



                                       79173644]                 

 

             Future Scheduled 2023-01-10   Lipid panel               [facility]

s



             Test         00:00:00     (procedure) [code =              [facility]



                                       70893502]                 

 

             Future Scheduled 2022   INFLUENZA VACCINE (#1)              C

[facility]



             Test         00:00:00     [code = INFLUENZA              Medical Ce

nter



                                       VACCINE (#1)]              

 

             Future Scheduled 2022   INFLUENZA VACCINE (#1)              C

[facility]



             Test         00:00:00     [code = INFLUENZA              Medical Ce

nter



                                       VACCINE (#1)]              

 

             Future Scheduled 2022   INFLUENZA VACCINE (#1)              C

[facility]



             Test         00:00:00     [code = INFLUENZA              Medical Ce

nter



                                       VACCINE (#1)]              

 

             Future Scheduled 2022   DEPRESSION SCREENING              [facility]



             Test         00:00:00     (12+) [code =              Medical Center



                                       DEPRESSION SCREENING              



                                       (12+)]                    

 

             Future Scheduled 2022   DEPRESSION SCREENING              [facility]



             Test         00:00:00     (12+) [code =              Medical Center



                                       DEPRESSION SCREENING              



                                       (12+)]                    

 

             Future Scheduled 2022   DEPRESSION SCREENING              [facility]



             Test         00:00:00     (12+) [code =              Medical Center



                                       DEPRESSION SCREENING              



                                       (12+)]                    

 

             Future Scheduled 2021   INFLUENZA VACCINE              [facility]



             Test         00:00:00     (Season Ended) [code =              [facility]



                                       INFLUENZA VACCINE              



                                       (Season Ended)]              

 

             Future Scheduled 2021   DEPRESSION SCREENING              [facility]



             Test         00:00:00     (12+) [code =              Medical Center



                                       DEPRESSION SCREENING              



                                       (12+)]                    

 

             Future Scheduled 2020-04-10   Hemoglobin A1c              [facility]

kes



             Test         00:00:00     measurement               Medical Center



                                       (procedure) [code =              



                                       71288859]                 

 

             Future Scheduled 2020-04-10   Hemoglobin A1c              [facility]

kes



             Test         00:00:00     measurement               Medical Center



                                       (procedure) [code =              



                                       17863685]                 

 

             Future Scheduled 2020-04-10   Hemoglobin A1c              [facility]

kes



             Test         00:00:00     measurement               Medical Center



                                       (procedure) [code =              



                                       04518514]                 

 

             Future Scheduled 2020-04-10   Hemoglobin A1c              [facility]

kes



             Test         00:00:00     [facility]



                                       (procedure) [code =              



                                       81880674]                 

 

             Future Scheduled 2004   DTAP/TDAP/TD VACCINES              [facility]



             Test         00:00:00     (1 - Tdap) [code =              Medical C

enter



                                       DTAP/TDAP/TD VACCINES              



                                       (1 - Tdap)]               

 

             Future Scheduled 2004   DTAP/TDAP/TD VACCINES              [facility]



             Test         00:00:00     (1 - Tdap) [code =              Medical C

enter



                                       DTAP/TDAP/TD VACCINES              



                                       (1 - Tdap)]               

 

             Future Scheduled 2004   DTAP/TDAP/TD VACCINES              [facility]



             Test         00:00:00     (1 - Tdap) [code =              Medical C

enter



                                       DTAP/TDAP/TD VACCINES              



                                       (1 - Tdap)]               

 

             Future Scheduled 2004   DTAP/TDAP/TD VACCINES              [facility]



             Test         00:00:00     (1 - Tdap) [code =              Medical C

enter



                                       DTAP/TDAP/TD VACCINES              



                                       (1 - Tdap)]               

 

             Future Scheduled 2003   HEPATITIS C SCREENING              [facility]



             Test         00:00:00     [code = HEPATITIS C              Medical 

Center



                                       SCREENING]                

 

             Future Scheduled 2003   HEPATITIS C SCREENING              [facility]



             Test         00:00:00     [code = HEPATITIS C              Medical 

Center



                                       SCREENING]                

 

             Future Scheduled 2003   HEPATITIS C SCREENING              [facility]

[facility]



             Test         00:00:00     [code = HEPATITIS C              Medical 

Center



                                       SCREENING]                

 

             Future Scheduled 2003   HEPATITIS C SCREENING              [facility]



             Test         00:00:00     [code = HEPATITIS C              Medical 

Center



                                       SCREENING]                

 

             Future Scheduled 1997   COVID-19 VACCINE (1)              [facility]



             Test         00:00:00     [code = COVID-19              Medical Abilio

ter



                                       VACCINE (1)]              

 

             Future Scheduled 1997   Tobacco Cessation              [facility]



             Test         00:00:00     Counseling and              Medical Cente

r



                                       Screening (12+) [code              



                                       = Tobacco Cessation              



                                       Counseling and              



                                       Screening (12+)]              

 

             Future Scheduled 1997   Tobacco Cessation              [facility]



             Test         00:00:00     Counseling and              Medical Cente

r



                                       Screening (12+) [code              



                                       = Tobacco Cessation              



                                       Counseling and              



                                       Screening (12+)]              

 

             Future Scheduled 1995   DIABETIC EYE EXAM              [facility]



             Test         00:00:00     [code = DIABETIC EYE              Medical

 Center



                                       EXAM]                     

 

             Future Scheduled 1995   Urine screening for              [facility]



             Test         00:00:00     protein (procedure)              Medical 

Center



                                       [code = 891697281]              

 

             Future Scheduled 1995   DIABETIC EYE EXAM              [facility]



             Test         00:00:00     [code = DIABETIC EYE              Medical

 Center



                                       EXAM]                     

 

             Future Scheduled 1995   Urine screening for              [facility]



             Test         00:00:00     protein (procedure)              Medical 

Center



                                       [code = 890455057]              

 

             Future Scheduled 1995   DIABETIC EYE EXAM              [facility]



             Test         00:00:00     [code = DIABETIC EYE              Medical

 Center



                                       EXAM]                     

 

             Future Scheduled 1995   Urine screening for              [facility]



             Test         00:00:00     protein (procedure)              Medical 

Center



                                       [code = 184134453]              

 

             Future Scheduled 1995   DIABETIC EYE EXAM              [facility]



             Test         00:00:00     [code = DIABETIC EYE              Medical

 Center



                                       EXAM]                     

 

             Future Scheduled 1995   Urine screening for              [facility]



             Test         00:00:00     protein (procedure)              Medical 

Center



                                       [code = 582895154]              

 

             Future Scheduled 1991   PNEUMOCOCCAL VACCINE              [facility]



             Test         00:00:00     0-64 YRS (1 of 1 -              Medical C

enter



                                       PPSV23) [code =              



                                       PNEUMOCOCCAL VACCINE              



                                       0-64 YRS (1 of 1 -              



                                       PPSV23)]                  

 

             Future Scheduled 1991   PNEUMOCOCCAL VACCINE              [facility]



             Test         00:00:00     0-64 YRS (1 - PCV)              Medical C

enter



                                       [code = PNEUMOCOCCAL              



                                       VACCINE 0-64 YRS (1 -              



                                       PCV)]                     

 

             Future Scheduled 1991   PNEUMOCOCCAL VACCINE              [facility]



             Test         00:00:00     0-64 YRS (1 - PCV)              Medical C

enter



                                       [code = PNEUMOCOCCAL              



                                       VACCINE 0-64 YRS (1 -              



                                       PCV)]                     

 

             Future Scheduled 1991   PNEUMOCOCCAL VACCINE              [facility]



             Test         00:00:00     0-64 YRS (1 - PCV)              Medical C

enter



                                       [code = PNEUMOCOCCAL              



                                       VACCINE 0-64 YRS (1 -              



                                       PCV)]                     

 

             Future Scheduled 1986   COVID-19 VACCINE (#1)              [facility]



             Test         00:00:00     [code = COVID-19              Medical Abilio

ter



                                       VACCINE (#1)]              

 

             Future Scheduled 1986   COVID-19 VACCINE (#1)              [facility]



             Test         00:00:00     [code = COVID-19              Medical Abilio

ter



                                       VACCINE (#1)]              

 

             Future Scheduled 1986   COVID-19 VACCINE (#1)              [facility]



             Test         00:00:00     [code = COVID-19              Medical Abilio

ter



                                       VACCINE (#1)]              







Encounters







        Start   End     Encounter Admission Attending Care    Care    Encounter 

Source



        Date/Time Date/Time Type    Type    Clinicians Facility Department ID   

   

 

        2023         [facility]         [facility]  [id number]

 [facility]



        10:47:00                         Domingo                  [id number]   [facility]

 

        2023         Outpatient                 [facility]     [id number]

 [place]



        15:02:41                                                 [phone]    [facility]

 

        2023         Outpatient         Jesusita,  STLMLC  [facility]  [id number]

 Common



        15:24:00                         Domingo                  [id number]   [facility]

 

        2022         Outpatient                 [facility]     [id number]

 [place]



        10:51:49                                                 [phone]    [facility]

 

        2022         Outpatient                 [facility]     -[id number]

 [place]



        08:22:27                                                 [id number]    [facility]

 

        2022         Outpatient                 [facility]     [id number]

 [place]



        11:10:01                                                 [phone]    [facility]

 

        2022         Outpatient         [facility]  [id number]

 Common



        13:45:16                         Domingo                  [id number]   [facility]

 

        2022         Outpatient         [facility]  [id number]

 Common



        13:17:39                         Domingo                  [id number]   [facility]

 

        2022         Outpatient         Jesus[facility]  [id number]

 Common



        13:16:34                         Domingo                  [address]   [facility]

 

        2023 Patient         MAHENDRA AllanCANDIE  1.2.840.114 [id number]

7425 Univers



        00:00:00 00:00:00 Outreach         Marcela CASTRO DUNN   350.1.13.10         i

ty of



                                                [place]   4.2.7.2.686         [place]



                                                        [phone]         Medi

sarah



                                                        [address]             [address]

 

        2023 [facility] R       [facility]    [id number]

5019 Univers



        14:00:00 14:00:00                 SUREKHA                           ity [facility]

 

        2023 Outpatient R               [facility]    [id number]

959 Univers



        14:00:00 14:00:00                                                 ity [facility]

 

        2023 Telephone         Alan  [facility]    1.2.244.641 [id number]

[id number] Univers



        00:00:00 00:00:00                 Anette A HEALTH  350.1.13.10         

ity of



                                                [place] 4.2.7.2.686         Eric

as



                                                HÉCTOR?BLEA [phone]         [address]



                                                MEDICAL                 



                                                OFFICE                  



                                                [facility]                 

 

        2023-05-10 2023-05-10 [facility] R       [facility]    [id number]

9004 Univers



        13:30:00 13:30:00                 USREKHA                           ity [facility]

 

        2023 Emergency X       [facility]    ERT     [id number]

[id number] Univers



        14:23:00 19:32:00                 , KATIA                         ity [facility]

 

        2023 Emergency         [facility]    1.2.840.114 

[id number] Univers



        14:23:00 19:32:00                 , [facility]ia [facility] 350.1.13.10         i

ty of



                                                [place] 4.2.7.2.686         [place]

[place]



                                                [place]  [phone]         [address]

 

        2023 Outpatient R               [facility]    [id number]

072 Univers



        13:00:00 13:00:00                                                 ity of



                                                                        [facility]

 

        2023 Telephone         Ashley Mary Anne [facility]    1.2.468.370 [id number]

[id number] Univers



        00:00:00 00:00:00                         HEALTH  350.1.13.10         it

y of



                                                [facility] 4.2.7.2.686         Eric

as



                                                HÉCTOR?BLEA [phone]         [address]



                                                MEDICAL                 



                                                OFFICE                  



                                                [facility]                 

 

        2023 [facility] R       [facility]    [id number]

3200 Univers



        14:00:00 14:00:00                 SUREKHA                           ity of



                                                                        [facility]

 

        2023 Telephone         Ashley [facility]    1.2.595.984 [id number]

[id number] Univers



        00:00:00 00:00:00                         HEALTH  350.1.13.10         it

y of



                                                [place] 4.2.7.2.686         Eric

as



                                                HÉCTOR?BLEA [phone]         Me

stella



                                                [facility]    [address]             [place]



                                                MEDICAL                 



                                                OFFICE                  



                                                [facility]                 

 

        2023 Orders          Doctor  EDEN    1.2.840.114 [id number]

431 Univers



        00:00:00 00:00:00 Only            Unassigned, STEPHANIE   350.1.13.10       

  ity of



                                        [place] [facility] 4.2.7.2.686         Eric

as



                                                        [phone]         [address]

 

        2023 [facility]         [facility]    1.2.876.164 [id number]

[id number] Univers



        00:00:00 00:00:00                 Anette A HEALTH  350.1.13.10         

ity of



                                                [place] 4.2.7.2.686         Eric

as



                                                HÉCTOR?BLEA [phone]         [address]                 



                                                OFFICE                  



                                                [facility]                 

 

        2023 Outpatient R       MARY ANNE RAMIREZ [facility]    [id number]

160 Univers



        16:30:00 16:30:00                 [facility]Z                         [facility]

 

        2023 Telephone         Ashley [facility]    1.2.805.990 [id number]

[id number] Univers



        00:00:00 00:00:00                         HEALTH  350.1.13.10         it

y of



                                                [place] 4.2.7.2.686         Eric

as



                                                HÉCTOR?BLEA [phone]         Me

stella HERNANDEZ    044             [facility]                 



                                                OFFICE                  



                                                [facility]                 

 

        2023 Telephone         Ashley [facility]    1.2.887.379 [id number]

[id number] Univers



        00:00:00 00:00:00                         HEALTH  350.1.13.10         it

y of



                                                [place] 4.2.7.2.686         Eric

as



                                                HÉCTOR?BLEA [phone]         Me

stella RUIZ    [address]             [place]



                                                MEDICAL                 



                                                OFFICE                  



                                                [facility]                 

 

        2023 Emergency X       BRIGIDO [facility]    ERT     [id number]

37 Univers



        14:34:00 20:52:00                 RADHA                         [facility]

 

        2023 Emergency         [facility]    1.2.020.497 [id number]

[id number] Univers



        14:34:00 20:52:00                 Radha MCCARTHY 350.1.13.10         i

ty of



                                                [facility] 4.2.7.2.686         [place]



                                                CAMPUS  [phone]         Medi

sarah



                                                        [address]             [place]

 

        2023 Outpatient R       MARY ANNE RAMIREZ [facility]    [id number]

308 Univers



        13:00:00 14:32:40                 MARY ANNE RAMIREZ                         [facility]

 

        2023 Office          Ashley [facility]    1.2.840.114 [id number]

46 Univers



        13:00:00 14:32:40 Visit                   HEALTH  350.1.13.10         it

y of



                                                [facility] 4.2.7.2.686         Eric

as



                                                HÉCTOR?BLEA [phone]         Me

dical



                                                [facility]    220             [facility]                 



                                                OFFICE                  



                                                [facility]                 

 

        2023 Telephone         Ashley [facility]    1.2.727.498 [id number]

[id number] Univers



        00:00:00 00:00:00                         HEALTH  350.1.13.10         it

y of



                                                [facility] 4.2.7.2.686         Eric

as



                                                HÉCTOR?BLEA [phone]         [facility]



                                                [facility]    220             [facility]                 

 

        2023 Telephone         [facility]    1.2.840.114 102

[id number] Univers



        00:00:00 00:00:00                 Surekha   [facility] 350.1.13.10         i

ty of



                                                [facility] 4.2.7.2.686         [place]

[place]



                                                PROFESSIO [phone]         Me

dical



                                                [facility]     204             [facility]                 

 

        2023 Outpatient R       JIMENA [facility]    [id number]

2965 Univers



        14:00:00 14:45:52                 [facility]

 

        2023 Telephone         [facility]    1.2.840.114 102

[id number] Univers



        00:00:00 00:00:00                 Surekha   [place] 350.1.13.10         i

ty of



                                                [place] 4.2.7.2.686         [place]

[place]



                                                PROFESSIO [phone]         Me

dical



                                                NAL     [address]             [address]



                                                [facility]                 

 

        2023 Emergency X       [facility]    ERT     [id number]

33 Univers



        16:59:00 23:22:00                 NEETA                          ity of



                                                                        [facility]

 

        2023 Emergency         [facility]    1.2.881.159 1350

[id number] Univers



        16:59:00 23:22:00                 Neeta G SHARI 350.1.13.10         

ity of



                                                [place] 4.2.7.2.686         [place]

[place]



                                                CAMPUS  [phone]         Medi

sarah



                                                        [address]             [place]

 

        2023 Outpatient R       JIMENA [facility]    [id number]

2020 Univers



        13:00:00 13:49:32                 SUREKHA                           ity [facility]

 

        2023 Nurse           Nurse, [facility]    1.2.

840.114 [id number] 

Univers



        13:00:00 13:49:32 Visit           Surekha Huynh 350.1.13.10   

      ity of



                                                [place] 4.2.7.2.686         [place]

[place]



                                                PROFESSIO [phone]         Me

stella AKERS     [address]             [facility]                 

 

        2023 Telephone         Mary Anne Ramirez [facility]    1.2.733.711 [id number]

65339 Univers



        00:00:00 00:00:00                         HEALTH  350.1.13.10         it

y of



                                                [facility] 4.2.7.2.686         Eric

as



                                                HÉCTOR?BLEA [phone]         Me

shaneandrew HERNANDEZ    [address]             [place]



                                                MEDICAL                 



                                                OFFICE                  



                                                BUILDING                 

 

        2023 Emergency X       \[facility] [place] [facility]""    ERT     [id number]

4458 Univers



        15:37:00 21:02:00                 BOSSMAN                         ity [facility]

 

        2023 Emergency         [facility]    1.2.840.114 [id number]

[id number] Univers



        15:37:00 21:02:00                 Bossman MCCARTHY 350.1.13.10        

 ity of



                                                [place] 4.2.7.2.686         [place]

[place]



                                                [place]  [phone]         Medi

sarah



                                                        [address]             [address]

 

        2023 Outpatient R       MARY ANNE RAMIREZ [facility]    [id number]

992 Univers



        14:30:00 14:30:00                 [facility]NE RAMIREZ                         [facility]

 

        [facility]23 Outpatient R       JIMENA [facility]    [id number]

5800 Univers



        13:30:00 13:30:00                 SUREKHA                           ity [facility]

 

        2023 Transition         IMTIAZ Guzman  1.2.840.114 100

[id number] Univers



        00:00:00 00:00:00 of Care         Dulce DUNN   350.1.13.10        

 ity of



                                                [place]   4.2.7.2.686         [place]

[place]



                                                        [phone]         Medi

sarah



                                                        [address]             [place]

 

        2023 Inpatient X       DONALD FOX [facility]  

   [id number] Univers



        18:47:00 14:22:00                 DONALD FOX                    

     [facility]

 

        [facility]         [facility]    1.2.8

40.114 [id number] 

Univers



        18:47:00 14:22:00 Encounter         [facility]  350.1.13.10    

     ity of



                                        Donald Fox  4.2.7.2.686  

       [place]



                                                CITY    [phone]         [address]                 



                                                ([facility]                   

 

        2023 Patient         IMTIAZ Allan  1.2.840.114 [id number]

9966 Univers



        00:00:00 00:00:00 Outreach         Marcela DUNN   350.1.13.10         i

ty of



                                                [place]   4.2.7.2.686         [place]

[place]



                                                        [phone]         Medi

sarah



                                                        [address]             [place]

 

        2023 Surgery         Derrek [facility]    1.2.840.114 [id number]

198 Univers



        13:29:00 15:37:00                 Vikash CANDY 350.1.13.10         

ity of



                                                CARE    4.2.7.2.686         [place]

[place]



                                                CENTER .[phone]         Me

stella VILLARREAL 020             [facility]                   

 

        2023 Telephone         Mary Anne Ramirez [facility]    1.2.023.650 [id number]

14938 Univers



        00:00:00 00:00:00                         HEALTH  350.1.13.10         it

y of



                                                [place] 4.2.7.2.686         Eric

as



                                                HÉCTOR?BLEA [phone]         Me

stella HERNANDEZ    220             [facility]                 



                                                OFFICE                  



                                                [facility]                 

 

        2023 Patient         IMTIAZ Salcedo  1.2.840.114 [id number]

278 Univers



        00:00:00 00:00:00 Outreach         Alondra LYY   350.1.13.10         

ity of



                                                [place]   4.2.7.2.686         [place]

[place]



                                                        [phone]         [address]

 

        2023-02-15 2023-02-15 Telephone         Mary Anne Ramirez [facility]    1.2.211.940 [id number]

[id number] Univers



        00:00:00 00:00:00                         HEALTH  350.1.13.10         it

y of



                                                [place] 4.2.7.2.686         Eric

as



                                                HÉCTOR?BLEA [phone]         [facility]    [address] [place] [address]                 



                                                OFFICE                  



                                                [facility]                 

 

        2023 Patient         IMTIAZ Salcedo  1.2.840.114 [id number]

931 Univers



        00:00:00 00:00:00 Outreach         Alondra DUNN   350.1.13.10         

ity of



                                                [place]   4.2.7.2.686         [place]

[place]



                                                        [phone]         [address]

 

        2023 Alina Hugo [facility]    1.2.840.114 10

[id number] Univers



        00:00:00 00:00:00                 [facility], Adria HEALTH  350.1.13.10       

  ity of



                                                [place] 4.2.7.2.686         Eric

as



                                                HÉCTOR?BLEA [phone]         [facility]    [address]             [facility]                 

 

        2023 Emergency X       SINGER, [facility]    ERT     [id number]

48 Univers



        13:47:00 18:16:00                 VARINDER melton [facility]

 

        2023 Emergency         Singer, [facility]    1.2.161.756 [id number]

66321 Univers



        13:47:00 18:16:00                 Varinder MCCARTHY 350.1.13.10         i

ty of



                                                [place] 4.2.7.2.686         [place]

[place]



                                                CAMPUS  [phone]         [address]

 

        2023 Telephone         Mary Anne Ramirez [facility]    1.2.558.005 [id number]

81946 Univers



        00:00:00 00:00:00                         HEALTH  350.1.13.10         it

y of



                                                [place] 4.2.7.2.686         Eric

as



                                                HÉCTOR?BLEA [phone]         [address]



                                                MEDICAL                 



                                                OFFICE                  



                                                [facility]                 

 

        2023-02-10 2023 Emergency X       [facility]    ERT     [id number]

1672 Univers



        16:13:00 00:01:00                 BOSSMAN melton [facility]

 

        2023-02-10 2023 Emergency         [facility]    1.2.840.114 [id number]

[id number] Univers



        16:13:00 00:01:00                 Folcristophero F SHARI 350.1.13.10        

 ity of



                                                [place] 4.2.7.2.686         [place]

[place]



                                                [place]  [phone]         [address]

 

        2023-02-10 2023-02-10 Patient         Roya Sotelo  1.2.840.114 10

[id number] Univers



        00:00:00 00:00:00 Outreach         E       DUNN   350.1.13.10         i

ty of



                                                [place]   4.2.7.2.686         [place]

[place]



                                                        [phone]         [address]

 

        2023-02-10 2023-02-10 Telephone         Mary Anne Ramirez [facility]    1.2.525.376 [id number]

[id number] Univers



        00:00:00 00:00:00                         HEALTH  350.1.13.10         it

y of



                                                [place] 4.2.7.2.686         Eric

as



                                                HÉCTOR?BLEA [phone]         [address]



                                                MEDICAL                 



                                                OFFICE                  



                                                [facility]                 

 

        2023 Emergency X       CHICO [facility]    ERT     [id number]

[id number] Univers



        15:06:00 19:05:00                 INOCENCIA melton [facility]

 

        2023 Emergency         [place], [facility]    1.2.840.114 10

[id number] Univers



        15:06:00 19:05:00                 Inocencia MCCARTHY 350.1.13.10         

ity of



                                                [place] 4.2.7.2.686         [place]

[place]



                                                [place]  [phone]         Medi

sarah



                                                        [address]             [place]

 

        2023 Outpatient X       LEA [facility]     [id number]

9408 Univers



        14:05:00 19:22:00                 ADNAN                           ity of



                                                                        [facility]

 

        2023 Emergency         Radha oFfana [facility]    1.2.840.

114 [id number] 

Univers



        14:05:00 19:22:00                 Rand Dong 350.1.13.10   

      ity of



                                                [place] 4.2.7.2.686         [place]

[place]



                                                [place]  [phone]         Medi

sarah



                                                        [address]             [place]

 

        2023 Patient         Roya Sotelo  1.2.840.114 10

[id number] Univers



        00:00:00 00:00:00 Outreach         E       DUNN   350.1.13.10         i

ty of



                                                [place]   4.2.7.2.686         [place]

[place]



                                                        [phone]         Medi

sarah



                                                        [address]             [place]

 

        2023 Patient         Mary Anne Ramirez [facility]    1.2.840.114 [id number]

70 Univers



        00:00:00 00:00:00 Secure [facility]  350.1.13.10        

 ity of



                                                [place] 4.2.7.2.686         Eric

as



                                                HÉCTOR?BLEA [phone]         Me

stella HERNANDEZ    [address]             [place]



                                                MEDICAL                 



                                                OFFICE                  



                                                BUILDING                 

 

        2022 Outpatient R       JIMENA [facility]    [id number]

7713 Univers



        13:00:00 15:17:50                 SUREKHA                           geronimo [facility]

 

        2022 Office          [facility]    1.2.840.114 [id number]

428 Univers



        13:00:00 15:17:50 Visit           Surekha MCCARTHY 350.1.13.10         i

ty of



                                                [place] 4.2.7.2.686         [place]

[place]



                                                PROFESSIO [phone]         Me

stella AKERS     204             [facility]                 

 

        2022 Orders          Doctor  EDEN    1.2.840.114 [id number]

85 Univers



        00:00:00 00:00:00 Only            Unassigned, STEPHANIE   350.1.13.10       

  ity of



                                        [facility] 4.2.7.2.686         Eric

as



                                                        [phone]         Medi

sarah



                                                        009             [place]

 

        2022 Outpatient R       MARY ANNE RAMIREZ [facility]    [id number]

401 Univers



        13:00:00 13:39:07                 MARY ANNE RAMIREZ                         [facility]

 

        2022 Office          Mary Anne Ramirez [facility]    1.2.840.114 [id number]

19 Univers



        13:00:00 13:39:07 Visit                   HEALTH  350.1.13.10         it

y of



                                                [place] 4.2.7.2.686         Eric

as



                                                HÉCTOR?BLEA [phone]         Me

stella HERNANDEZ    220             [facility]                 

 

        2022 Technician         Lab, Ang - [facility]    1.2.840.1

14 [id number] 

Univers



        12:45:00 13:00:00 Visit           Mary Anne Ramirez [facility]  350.1.13.10         it

y of



                                                [place] 4.2.7.2.686         Eric

as



                                                HÉCTOR?BLEA [phone]         Me

stella HERNANDEZ    353             [facility]                 

 

        2022 Outpatient X       JOHN [facility]     [id number]

754 Univers



        16:26:00 17:36:00                 EDWARDO melton [facility]

 

        2022 Emergency         Neeta Maria [facility]    1.2.840

.114 [id number] 

Univers



        16:26:00 17:36:00                 Edwardo Lopez 350.1.13.10    

     ity of



                                                [place] 4.2.7.2.686         [place]

[place]



                                                [place]  [phone]         Medi

sarah



                                                        081             [place]

 

        2022-10-31 2022-10-31 Emergency X       ERUM, [facility]    ERT     [id number]

47 Univers



        20:12:00 22:15:00                 ALMA melton [facility]

 

        2022-10-31 2022-10-31 Emergency         Cacace, [facility]    1.2.864.332 [id number]

4196 Univers



        20:12:00 22:15:00                 Alma MCCARTHY 350.1.13.10         

ity of



                                                [place] 4.2.7.2.686         [facility]  [phone]         Medi

sarah



                                                        084             [place]

 

        2022-10-29 2022-10-29 Emergency X       [facility]    ERT     [id number]

67 Univers



        19:14:00 22:40:00                 WAKILI                          ity [facility]

 

        2022-10-29 2022-10-29 [facility]         [facility]    1.2.008.676 [id number]

9139 Univers



        19:14:00 22:40:00                 Silvino GRANADOS SHARI 350.1.13.10         

ity of



                                                [place] 4.2.7.2.686         [facility]  [phone]         Medi

sarah



                                                        084             [place]

 

        2022-10-25 2022-10-25 Transition         IMTIAZ Guzman  1.2.840.114 [id number]

27607 Univers



        00:00:00 00:00:00 of Care         Dulce DUNN   350.1.13.10        

 ity of



                                                [place]   4.2.7.2.686         [place]

[place]



                                                        [phone]         Medi

sarah



                                                        403             [place]

 

        2022-10-21 2022-10-23 [facility] X       [facility]     [id number]

387 Univers



        00:19:00 12:45:00                 MERCY                           ity [facility]

 

        2022-10-21 2022-10-23 [facility]    1.2.8

40.114 [id number] 

Univers



        00:19:00 12:45:00 Encounter         Rachel Bailey SHARI 350.1.13.10 

        ity [facility]



                                                [facility] 4.2.7.2.686         [facility]  [phone]         Medi

sarah



                                                        081             Branch

 

        2022-10-18 2022-10-18 Outpatient JOANIE BONDS [facility]    [id number]

[id number] Univers



        08:00:00 08:00:00                 SAPPHIRE                         ity

 [facility]

 

        2022-10-10 2022-10-10 Outpatient R       VAMSHI [facility]    [id number]

11312 Univers



        08:00:00 08:00:00                 SAPPHIRE                         ity

 [facility]

 

        2022 Outpatient R       ASHLEY, NORTH [facility]    [id number]

271 Univers



        14:00:00 14:00:00                 MARY ANNE RAMIREZ                         ity [facility]

 

        2022 Outpatient R       VAMSHI [facility]    [id number]

77108 Univers



        08:30:00 08:30:00                 SAPPHIRE                         ity

 [facility]

 

        2022 Outpatient R       VAMSHI [facility]    [id number]

08781 Univers



        08:00:00 08:00:00                 SAPPHIRE                         ity

 [facility]

 

        2022 [facility]    42941

20534 Univers



        11:30:00 11:30:00                 SAPPHIRE                         ity

 [facility]

 

        2022 Telephone         EDEN Helms    1.2.840.114 [id number]

[id number] Univers



        00:00:00 00:00:00                 Yessica BROWN   350.1.13.10         i

ty of



                                                [facility] 4.2.7.2.686         Eric

as



                                                        [phone]         Medi

sarah



                                                        025             [place]

 

        2022 Telephone         Yoselyn   [facility]    1.2.826.406 [id number]

1965 Univers



        00:00:00 00:00:00                 Jessi DENISE 350.1.13.10       

  ity of



                                                [place]   4.2.7.2.686         [place]

[place]



                                                [place]  [phone]         Medi

sarah



                                                [place] [address]             Branch



                                                DIABETES                 



                                                CLINIC                  

 

        2022-08-15 2022-08-15 Transition         IMTIAZ Rodney  1.2.840.114 [id number]

85603 Univers



        00:00:00 00:00:00 of Care         Stephany DUNN   350.1.13.10         i

ty of



                                                [place]   4.2.7.2.686         [place]

[place]



                                                        [phone]         Medi

sarah



                                                        403             [place]

 

        2022 Inpatient PEDRO HUTTON [facility]    [place]     [id number]

94106 Univers



        22:57:00 15:56:00                                                 ity of



                                                                        [facility]

 

        2022 [facility]         Yo Law  1.2.840.

114 [id number] 

Univers



        22:57:00 15:56:00 Encounter         Keenan Uribe STEPHANIE   350.1

.13.10         ity of



                                        León Chambers [facility] 4.2.7.2.686     

    [place]



                                        Liz Phillips G         [phone]     

    [facility]         095           

  [facility]

 

        2022 Anesthesia         Sheridan Delarosa  1.2.84

0.114 [id number] 

Univers



        11:25:00 12:55:00 Event           [place] STEPHANIE   350.1.13.10    

     ity of



                                                [facility] 4.2.7.2.686         Eric

as



                                                        [phone]         Medi

sarah



                                                        103             [place]anch

 

        2022 Surgery         DAVID Bonds  1.2.050.269 [id number]

3251 Univers



        10:12:00 11:55:00                 Sapphire SEALY   350.1.13.10        

 ity of



                                        [facility] 4.2.7.2.686         Eric

as



                                                        [phone]         Medi

sarah



                                                        103             [place]

 

        2022 Travel                  1.2.840.1 1.2.494.634 9362

9911 Univers



        00:00:00 00:00:00                         27611.1.1 350.1.13.10         

ity of



                                                3.104.2.7 4.2.7.3.698         [place]

[place]



                                                .3.694148 084.8           Medica

l



                                                .8                      Branch

 

        2022 Transition         Thomas,  1.2.840.8 [id number]

[id number] Univers



        00:00:00 00:00:00 of Care         Dulce 60425.1.1                 i

ty of



                                                3.104.2.7                 [place]



                                                .3.498050                 Medica

l



                                                .8                      Branch

 

        2022 Inpatient X       TABITHA [facility]    KR[place]     [id number]

04 Univers



        18:31:00 18:27:00                 SUNIL                          ity of



                                                                        [facility]

 

        2022 [facility]         Ashly Ortega 1.2.840.1 [id number]

[id number] 

Univers



        18:31:00 18:27:00 Encounter         Rachel Bailey 49163.1.1            

     ity of



                                        Sunil Lu 3.104.2.7                

 [place]



                                                .3.240261                 Medica

l



                                                .8                      [place]

 

        [facility]    1041

[id number] Univers



        13:00:00 13:00:00                 RADHANEWTON melton o

[facility]

 

        2022 Travel                  1.2.840.1 1.2.752.894 0350

5846 Univers



        00:00:00 00:00:00                         12079.1.1 350.1.13.10         

ity of



                                                3.104.2.7 4.2.7.3.698         [place]

[place]



                                                .3.261661 084.8           Medica

l



                                                .8                      [place]

 

        2022-07-15 2022-07-15 [facility]       [facility]     [id number]

93 Univers



        17:06:00 23:29:00                 VARINDER                         itlissa [facility]

 

        2022-07-15 2022-07-15 Emergency         Alondra Santos 1.2.840.1 [id number]

[id number]

                                        Univers



        17:06:00 23:29:00                 Mian Solip 54286.1.1             

    ity of



                                                3.104.2.7                 [place]



                                                .3.366254                 Medica

l



                                                .8                      [place]

 

        2022-07-15 2022-07-15 Travel                  1.2.840.1 1.2.672.103 2156

2220 Univers



        00:00:00 00:00:00                         96001.1.1 350.1.13.10         

ity of



                                                3.104.2.7 4.2.7.3.698         [place]

[place]



                                                .3.766753 084.8           Medica

l



                                                .8                      [place]

 

        [facility]    1040

[id number] Univers



        14:00:00 14:00:00                 RADHA                         geronimo ajith spangler



                                                                        [facility]

 

        2022 Emergency X       BRIGIDO [facility]    ERT     [id number]

86 Univers



        04:52:00 08:20:00                 RADHA                         ity of



                                                                        [facility]

 

        2022 Emergency         Fofana, 1.2.840.3 [id number]

74813 Univers



        04:52:00 08:20:00                 Radha 06381.1.1                 ity 

of



                                                3.104.2.7                 [place]



                                                .3.686925                 Medica

l



                                                .8                      Br[place]

 

        2022 Travel                  1.2.840.1 1.2.029.619 3417

5013 Univers



        00:00:00 00:00:00                         72912.1.1 350.1.13.10         

ity of



                                                3.104.2.7 4.2.7.3.698         [place]

[place]



                                                .3.790388 084.8           Medica

l



                                                .8                      [place]

 

        2022 Transition         Guzman,  1.2.840.9 [id number]

[id number] Univers



        00:00:00 00:00:00 of Care         Dulce 39588.1.1                 i

ty of



                                                3.104.2.7                 [place]



                                                .3.759749                 Medica

l



                                                .8                      [place]

 

        2022 Inpatient X       MEADOWS [facility]     [id number]

63073 Univers



        20:31:00 21:18:00                 KEM                           ity of



                                                                        [facility]

 

        2022 Hospital         Radha Fofana 1.2.840.1 [id number]

[id number] 

Univers



        20:31:00 21:18:00 Encounter         Noe Phillips 07749.1.1              

   ity of



                                        Aidan Meadowsin 3.104.2.7              

   [place]



                                                .3.460970                 Medica

l



                                                .8                      [place]

 

        2022 Transition         Guzman,  1.2.840.4 [id number]

[id number] Univers



        00:00:00 00:00:00 of Care         Dulce 21740.1.1                 i

ty of



                                                3.104.2.7                 [place]



                                                .3.284050                 Medica

l



                                                .8                      Branch

 

        2022 Travel                  1.2.840.1 1.2.233.665 8208

6235 Univers



        00:00:00 00:00:00                         36249.1.1 350.1.13.10         

ity of



                                                3.104.2.7 4.2.7.3.698         [place]

[place]



                                                .3.567939 084.8           Medica

l



                                                .8                      Branch

 

        2022 Transition         Guzman,  1.2.840.7 [id number] 94

[id number] Univers



        00:00:00 00:00:00 of Care         Dulce 25778.1.1                 i

ty of



                                                3.104.2.7                 [place]



                                                .3.571649                 Medica

l



                                                .8                      [place]

 

        2022 Inpatient X       JOHN [facility]     [id number]

48 Univers



        22:21:00 16:16:00                 EDWARDO                           itlissa [facility]

 

        2022 Hospital         Josse Giles 1.2.840.1 [id number]

[id number] 

Univers



        22:21:00 16:16:00 Encounter         Edwardo Lopez 75755.1.1             

    ity of



                                                3.104.2.7                 [place]



                                                .3.434304                 Medica

l



                                                .8                      [place]

 

        2022 Travel                  1.2.840.1 1.2.415.747 9166

3062 Univers



        00:00:00 00:00:00                         57063.1.1 350.1.13.10         

ity of



                                                3.104.2.7 4.2.7.3.698         Te

[place]



                                                .3.192668 084.8           Medica

l



                                                .8                      [place]

 

        2022 2022-06-10 Outpatient X       [facility]     [id number]

8802 Univers



        18:25:00 19:30:00                 MERCLISSA                           ity [facility]

 

        2022 2022-06-10 Emergency         Shelton Cunningham 1.2.840.1 [id number]

[id number] 

Univers



        18:25:00 19:30:00                 Rachel Bailey 27631.1.1              

   ity of



                                                3.104.2.7                 [place]



                                                .3.420811                 Medica

l



                                                .8                      Branch

 

        2022 Travel                  1.2.840.1 1.2.979.694 7857

2645 Univers



        00:00:00 00:00:00                         66664.1.1 350.1.13.10         

ity of



                                                3.104.2.7 4.2.7.3.698         [place]

[place]



                                                .3.248722 084.8           Medica

l



                                                .8                      [place]

 

        2022 Travel                  1.2.840.1 1.2.235.946 7277

5045 Univers



        00:00:00 00:00:00                         28886.1.1 350.1.13.10         

ity of



                                                3.104.2.7 4.2.7.3.698         [place]

[place]



                                                .3.178682 084.8           Medica

l



                                                .8                      Branch

 

        2022 Transition         Rodney, 1.2.840.6 [id number] 94

[id number] Univers



        00:00:00 00:00:00 of Milka ARAUJO 76768.1.1                 ity

 of



                                                3.104.2.7                 [place]



                                                .3.879955                 Medica

l



                                                .8                      Branch

 

        2022 Inpatient X       SHAIKH [facility]     [id number]

83 Univers



        20:53:00 18:25:00                 DEAN spangler



                                                                        [facility]

 

        2022 [facility]         Radha Fofana 1.2.840.1 [id number]

[id number] 

Univers



        20:53:00 18:25:00 Encounter         Tobias Hernandez 80486.1.1           

      ity of



                                        Terminella, Efrain 3.104.2.7             

    [place]



                                        Martha Powell H .3.452273               

  Medical



                                        Dean Segovia .8                     

 Branch

 

        2022 Travel                  1.2.840.1 1.2.434.222 0957

3172 Univers



        00:00:00 00:00:00                         04935.1.1 350.1.13.10         

ity of



                                                3.104.2.7 4.2.7.3.698         Te

[place]



                                                .3.094825 084.8           Medica

l



                                                .8                      Branch

 

        2022 Transition         Guzman,  1.2.840.9 [id number]

[id number] Univers



        00:00:00 00:00:00 of Care         Dulce 70838.1.1                 i

ty of



                                                3.104.2.7                 [place]



                                                .3.562228                 Medica

l



                                                .8                      [place]

 

        2022-05-10 2022 Inpatient X       LEA [facility]     [id number]

201 Univers



        19:10:00 17:32:00                 ADNAN                           ity of



                                                                        [facility]

 

        2022-05-10 2022 [facility]         Brigido Radha 1.2.840.1 [id number]

[id number] 

Univers



        19:10:00 17:32:00 Encounter         Rand Dong 55191.1.1            

     ity of



                                                3.104.2.7                 [place]



                                                .3.613004                 Medica

l



                                                .8                      [place]

 

        2022-05-10 2022-05-10 Travel                  1.2.840.1 1.2.596.980 3917

6685 Univers



        00:00:00 00:00:00                         90647.1.1 350.1.13.10         

ity of



                                                3.104.2.7 4.2.7.3.698         Te

[place]



                                                .3.562677 084.8           Medica

l



                                                .8                      [place]

 

        2022 Observatio                 [facility][note 3]o [facility] 5510

[id number] Memoria



        12:00:00 22:50:00 [address]

 

        2022 Observatio                 [facility] 5510

[id number] Memoria



        12:00:00 22:50:00 [address]

 

        2022 Outpatient U       [place], [facility]    

    [facility]



        07:00:00 17:50:00                 [facility]                          

 

        2022 Outpatient         Donny, [facility]   5510

[id number] 



        07:00:00 17:50:00                 Romero Blas      

 

        2022 Outpatient         Donny, [facility]   5510

[id number] 



        18:14:00 18:14:00                 Romero Blas      

 

        2022 [facility]       [facility]     [id number]

29 Univers



        22:45:00 01:52:00                 VARINDER melton [facility]

 

        2022 Emergency         [facility]    1.2.205.749 [id number]

2685 Univers



        22:45:00 01:52:00                 Varinder MCCARTHY 350.1.13.10         i

[facility] 4.2.7.2.686         [facility]  [phone]         [address]

 

        2022 Inpatient                 [facility] [address]

[id number] Memoria



        05:12:00 15:30:00                         r       [address]

 

        2022 Inpatient                 [facility] [address]18

60235 Memoria



        05:12:00 15:30:00                         r       [address]

 

        2022 Outpatient         [facility]  [facility]   [id number]

[id number] 



        00:12:00 10:30:00                 Bernardo                  Krysta [facility]North                         

 

        2022 Emergency                 [facility] 19453

73794 Memoria



        02:51:00 02:51:00                         r       [address]

 

        2022 Emergency                 [facility] [address]

03026 Memoria



        02:51:00 02:51:00                         r       [address]

 

        2022 Outpatient         [facility],  [facility]   [id number]

[id number] 



        00:12:00 00:12:00                 Bernardo                  Krysta      



                                        [place]                         

 

        2022 Outpatient         [facility],  [facility]   [id number]

[id number] 



        21:51:00 21:51:00                 Bernardo Guajardo                         

 

        2022 Emergency X       ALLISON ROMEO [facility]    ERT     [id number]

[id number] Univers



        18:08:00 22:51:00                                                 itlissa [facility]

 

        2022 Emergency         ALLISON Romeo [facility]    1.2.840.114 [id number]

[id number] Univers



        18:08:00 22:51:00                 Elena MCCARTHY 350.1.13.10         i

ty of



                                                [place] 4.2.7.2.686         [facility]  [phone]         Medi

sarah



                                                        084             Branch

 

        2022 Transition         IMTIAZ Guzman  1.2.840.114 [id number]

[id number] Univers



        00:00:00 00:00:00 of Care         Dulce DUNN   350.1.13.10        

 ity of



                                                DARIEL[place]   4.2.7.2.686         [place]

s



                                                        [phone]         Medi

sarah



                                                        403             Branch

 

        2022 Inpatient X       [facility]    KRISH     [id number]

27 Univers



        19:07:00 19:39:00                 SILVINO                          ity [facility]

 

        2022 [facility]    1.2.840.

114 [id number] 

Univers



        19:07:00 19:39:00 Encounter         Rachel Bailey 350.1.13.10 

        ity of



                                                [place] [address].2.7.[address] [place] [address]  [phone]         Medi

sarah



                                                        081             Branch

 

        2022 Emergency X       CACACE, [facility]    ERT     [id number]

54 Univers



        14:41:00 22:07:00                 ALMA melton [facility]

 

        2022 Emergency         [place], [facility]    1.2.012.914 [id number]

7030 Univers



        14:41:00 22:07:00                 Alma MCCARTHY 350.1.13.10         

ity of



                                                [place] [address] [place] [address]  [phone]         Medi

sarah



                                                        084             Branch

 

        2022-01-15 2022-01-15 Emergency X       [facility]    ERT     [id number]

25 Univers



        14:49:00 21:33:00                 NEETA melton [facility]

 

        2022-01-15 2022-01-15 Emergency         Drever, [facility]    [id number]

0725 Univers



        14:49:00 21:33:00                 Neeta MCCARTHY 350.1.13.10         

ity of



                                                [place] [address] [place] [address]  [phone]         [address]

 

        2021 Laboratory         Only, Ang Db Test [facility]    1.2.8

40.114 [id number] 

Univers



        18:00:00 18:15:00 Only            Stefano Llanes [facility]  350.1.13.10      

   itNegra 4.2.7.2.686         Eric

as



                                                HÉCTOR?BLEA [phone]         [address]



                                                MEDICAL                 



                                                OFFICE                  



                                                BUILDING                 

 

        2021 Outpatient R       HI   [facility]    [id number]

[id number] Univers



        18:00:00 18:00:00                 STEFANO                          [facility]

 

        2021 (TEL)                   STLMLC  STLMLC  [id number] Co

mmon



        00:00:00 00:00:00                                                 [facility]

 

        2021 (ESTPT)                 STLMLC  STLMLC  [id number] Co

mmon



        00:00:00 00:00:00 Davise                                         Hannah

rit



                        d Patient                                         - [facility]

 

        2021 Emergency X       SINGER, [facility]    ERT     [id number]

74 Univers



        15:54:00 18:34:00                 VARINDER                         [facility]

 

        2021 Emergency         [facility]    1.2.819.350 [id number]

8236 Univers



        15:54:00 18:34:00                 Varinder SHARI 350.1.13.10         vishnu Cohn 4.2.7.2.686         [facility]  [phone]         [address]

 

        2021-10-12 2021-10-12 (TEL)                   STLMLC  STLMLC  [id number] Co

mmon



        00:00:00 00:00:00                                                 [facility]

 

        2021 (ESTPT)                 STLMLC  STLMLC  [id number] Co

mmon



        00:00:00 00:00:00 Establishe                                         Spi

rit



                        d Patient                                         - [facility]



                                                                        [facility]

 

        2021-08-10 2021-08-10 (TEL)                   STLMLC  STLMLC  [id number] Co

mmon



        00:00:00 00:00:00                                                 Spirit



                                                                        - [facility]



                                                                        [facility]

 

        2021 (ESTPT)                 STLMLC  STLC  [id number] Co

mmon



        00:00:00 00:00:00 Abhi gaona Patient                                         - [facility]



                                                                        [facility]

 

        2021 [facility]                  [facility]  [id number] Co

mmon



        00:00:00 00:00:00 VISIT NEW                                         Spir

it



                        PT LEVEL 3                                         - [facility]



                                                                        [facility]

 

        2021 Emergency         [facility]    1.2.840.114 [id number]

[id number] [facility]



        18:22:00 22:21:00                 Bossman Mccarthy 350.1.13.10        

 [facility] 4.2.7.2.686         [facility]  [phone]         [address]

 

        2021 Emergency         [facility]    1.2.840.114 [id number]

[id number] 



        18:22:00 22:21:00                 Bossman [place] 350.1.13.10        

 



                                                [place] 4.2.7.2.686         



                                                [place]  [phone]         



                                                        [employer]             

 

        2021 Emergency X       \[facility] [place] [facility]\""    ERT     [id number]

[id number] Univers



        18:22:00 18:22:00                 BOSSMAN                         [facility]

 

        2019 Phone                   nullFlavo MNA     [id number]

55 Memoria



        16:11:26 04:59:59 Message                 r       Neurosurger 08      l



                                                        y [facility]

 

        2019 Phone                   nullFlavo MNA     [id number]

55 Memoria



        16:11:26 04:59:59 Message                 r       Neurosurger 08      l



                                                        y [facility]

 

        2019 Outpatient                 MHMISCHER MISCHER 551

[id number] 



        11:11:26 23:59:59                                         2019 Phone                   nullFlavo MNA     [id number]

55 Memoria



        16:16:47 04:59:59 Message                 r       Neurosurger 07      l



                                                        y [facility]

 

        2019 Phone                   nullFlavo MNA     [id number]

55 Memoria



        16:16:47 04:59:59 Message                 r       Neurosurger 07      l



                                                        y [facility]

 

        2019 Outpatient                 MHMISCHER MHMISCHER 551

[id number] 



        11:16:47 23:59:59                                         07      

 

        2019-07-15 2019 Phone                   nullFlavo MNA     [id number]

55 Memoria



        15:52:03 04:59:59 Message                 r       Neurosurger 06      l



                                                        y [facility]

 

        2019-07-15 2019 Phone                   nullFlavo MNA     [id number]

55 Memoria



        15:52:03 04:59:59 Message                 r       Neurosurger 06      l



                                                        y [facility]

 

        2019-07-15 2019 Outpatient                 MHMISCHER MHMISCHER 551

[id number] 



        10:52:03 23:59:59                                         2019 Phone                   nullFlavo MNA     [id number]

55 Memoria



        18:55:03 04:59:59 Message                 r       Neurosurger 05      l



                                                        y [facility]

 

        2019 Phone                   nullFlavo MNA     [id number]

55 Memoria



        18:55:03 04:59:59 Message                 r       Neurosurger 05      l



                                                        y [facility]

 

        2019 Outpatient                 MHMISCHER MHMISCHER 551

[id number] 



        13:55:03 23:59:59                                         2018 Emergency                 [facility]Fl[facility] [address]

77[address] Memoria



        10:12:00 18:24:00                         r       [address]

 

        2018 Emergency                 nullFlavo [facility] [address]

78877 Memoria



        10:12:00 18:24:00                         joanie       [address]

 

        2018 Outpatient         Vijay [facility]   [id number]

[id number] 



        04:12:00 12:24:00                 Deisy Dial      

 

        2018 Outpatient                 [facility] [address]

[id number] Common



        11:15:00 11:15:00                         Compass-EOS         [facility]

[employer]



                                                [facility]

 

        2018 Outpatient                 [facility] [address]

[id number] Common



        11:30:00 11:30:00                         [address]   [address]         Spir

it



                                                Drive   [facility]

 

        2018 Outpatient                 [facility] [address]

[id number] Common



        15:41:00 15:41:00                         [address] [place]   [address]         Spir

it



                                                Drive   [facility]

 

        2018 Outpatient                 [address] [place] [address]

[id number] Common



        15:42:00 15:42:00                         t [place]   [address]         Spir

it



                                                Drive   [facility]

 

        2018 Outpatient                 [address] [place] [address]

[id number] Common



        11:00:00 11:00:00                         t [address]   [address]         Spir

it



                                                Drive   [facility]

 

        2018 Inpatient                 [facility] [address]

[id number] Memoria



        22:40:00 17:28:00                         [address]

 

        2018 Inpatient                 [facility] [address]

[id number] Memoria



        22:40:00 17:28:00                         [address]

 

        2018 Outpatient         Deedee Reinoso [facility]   551

[id number] 



        17:40:00 12:28:00                 Jamie                   23      







Results







           Test Description Test Time  Test Comments Results    Result Comments 

Source









                    COMP. METABOLIC PANEL (72354) 2023 21:13:07 









                      Test Item  Value      Reference Range Interpretation Comme

nts









             NA (test code = 0308382801) 131 mmol/L   135-145      L            

 

             K (test code = 8385815227) 4.8 mmol/L   3.5-5.0                   

 

             CL (test code = 3556192120) 100 mmol/L                       

 

             CO2 TOTAL (test code = 8780105232) 22 mmol/L    23-31        L     

       

 

             AGAP (test code = 4094245792) 9            2-16                    

  

 

             BUN (test code = 4720410020) 14 mg/dL     7-23                     

 

 

             GLUCOSE (test code = 2349895255) 396 mg/dL           H       

     

 

             CREATININE (test code = 0.63 mg/dL   0.60-1.25                 



             6317362980)                                         

 

             TOTAL BILI (test code = 0.7 mg/dL    0.1-1.1                   



             5631333222)                                         

 

             CALCIUM (test code = 2895204741) 9.7 mg/dL    8.6-10.6             

     

 

             T PROTEIN (test code = 4435243087) 7.8 g/dL     6.3-8.2            

       

 

             ALBUMIN (test code = 1253290932) 4.3 g/dL     3.5-5.0              

     

 

             ALK PHOS (test code = 1162222557) 146 U/L             H      

      

 

             ALTv (test code = 1742-6) 21 U/L       5-50                      

 

             AST(SGOT) (test code = 8638029605) 15 U/L       13-40              

       

 

             eGFR (test code = 0191910449) 143.3        mL/min/1.73m2           

   

 

             MAL (test code = MAL) Association of Glomerular                    

       



                          Filtration Rate (GFR) and Staging                     

      



                          of Kidney Disease*                           



                          +-----------------------+--------                     

      



                          -------------+-------------------                     

      



                          ------+| GFR (mL/min/1.73 m2) ?|                      

     



                          With Kidney Damage ?| ?Without                        

   



                          Kidney                                 



                          Damage+-----------------------+--                     

      



                          -------------------+-------------                     

      



                          ------------+| ?>90 ? ? ? ? ? ? ?                     

      



                          ? ?| ?Stage one ? ? ? ? ?| ?                          

 



                          Normal ? ? ? ? ? ? ?                           



                          ?+-----------------------+-------                     

      



                          --------------+------------------                     

      



                          -------+| ?60-89 ? ? ? ? ? ? ? ?|                     

      



                          ?Stage two ? ? ? ? ?| ? Decreased                     

      



                          GFR ? ? ? ?                            



                          +-----------------------+--------                     

      



                          -------------+-------------------                     

      



                          ------+| ?30-59 ? ? ? ? ? ? ? ?|                      

     



                          ?Stage three ? ? ? ?| ? Stage                         

  



                          three ? ? ? ? ?                           



                          +-----------------------+--------                     

      



                          -------------+-------------------                     

      



                          ------+| ?15-29 ? ? ? ? ? ? ? ?|                      

     



                          ?Stage four ? ? ? ? | ? Stage                         

  



                          four ? ? ? ? ?                           



                          ?+-----------------------+-------                     

      



                          --------------+------------------                     

      



                          -------+| ?<15 (or dialysis) ? ?|                     

      



                          ?Stage five ? ? ? ? | ? Stage                         

  



                          five ? ? ? ? ?                           



                          ?+-----------------------+-------                     

      



                          --------------+------------------                     

      



                          -------+ *Each stage assumes the                      

     



                          associated GFR level has been in                      

     



                          effect for at least three months.                     

      



                          ?Stages 1 to 5, with or without                       

    



                          kidney disease, indicate chronic                      

     



                          kidney disease. Notes:                           



                          Determination of stages one and                       

    



                          two (with eGFR >59mL/min/1.73 m2)                     

      



                          requires estimation of kidney                         

  



                          damage for at least three months                      

     



                          as defined by structural or                           



                          functional abnormalities of the                       

    



                          kidney, manifested by                           



                          either:Pathological abnormalities                     

      



                          or Markers of kidney damage                           



                          (including abnormalities in the                       

    



                          composition of the blood or urine                     

      



                          or abnormalities in imaging                           



                          tests).                                

 

             Lab Interpretation (test code = Abnormal                           

    



             34336-8)                                            



[facility] WITH [id number] 21:09:28





             Test Item    Value        Reference Range Interpretation Comments

 

             WBC (test code = 12.68        See_Comment  H             [Automated



             6690-2)                                             message] The sy

stem



                                                                 which generated



                                                                 this result



                                                                 transmitted



                                                                 reference range

:



                                                                 4.20 - 10.70



                                                                 10*3/?L. The



                                                                 reference range

 was



                                                                 not used to



                                                                 interpret this



                                                                 result as



                                                                 normal/abnormal

.

 

             RBC (test code = 5.46         See_Comment                [Automated



             789-8)                                              message] The sy

stem



                                                                 which generated



                                                                 this result



                                                                 transmitted



                                                                 reference range

:



                                                                 4.26 - 5.52



                                                                 10*6/?L. The



                                                                 reference range

 was



                                                                 not used to



                                                                 interpret this



                                                                 result as



                                                                 normal/abnormal

.

 

             HGB (test code = 16.2 g/dL    12.2-16.4                 



             718-7)                                              

 

             HCT (test code = 46.5 %       38.4-49.3                 



             4544-3)                                             

 

             MCV (test code = 85.2 fL      81.7-95.6                 



             787-2)                                              

 

             MCH (test code = 29.7 pg      26.1-32.7                 



             785-6)                                              

 

             MCHC (test code = 34.8 g/dL    31.2-35.0                 



             786-4)                                              

 

             RDW-SD (test code = 39.9 fL      38.5-51.6                 



             34858-4)                                            

 

             RDW-CV (test code = 13.1 %       12.1-15.4                 



             788-0)                                              

 

             PLT (test code = 444          See_Comment  H             [Automated



             777-3)                                              message] The sy

stem



                                                                 which generated



                                                                 this result



                                                                 transmitted



                                                                 reference range

:



                                                                 150 - 328 10*3/

?L.



                                                                 The reference r

zhen



                                                                 was not used to



                                                                 interpret this



                                                                 result as



                                                                 normal/abnormal

.

 

             MPV (test code = 10.0 fL      9.8-13.0                  



             38791-5)                                            

 

             NRBC/100 WBC (test 0.0          See_Comment                [Automat

ed



             code = 3877451203)                                        message] 

The system



                                                                 which generated



                                                                 this result



                                                                 transmitted



                                                                 reference range

:



                                                                 0.0 - 10.0 /100



                                                                 WBCs. The refer

ence



                                                                 range was not u

sed



                                                                 to interpret th

is



                                                                 result as



                                                                 normal/abnormal

.

 

             NRBC x10^3 (test code              See_Comment                [Auto

mated



             = [id number])                                        message] The s

ystem



                                                                 which generated



                                                                 this result



                                                                 transmitted



                                                                 reference range

:



                                                                 10*3/?L. The



                                                                 reference range

 was



                                                                 not used to



                                                                 interpret this



                                                                 result as



                                                                 normal/abnormal

.

 

             GRAN MAT (NEUT) % 76.0 %                                 



             (test code = 770-8)                                        

 

             IMM GRAN % (test code 0.30 %                                 



             = 1922336209)                                        

 

             LYMPH % (test code = 14.0 %                                 



             736-9)                                              

 

             MONO % (test code = 7.6 %                                  



             5905-5)                                             

 

             EOS % (test code = 1.8 %                                  



             713-8)                                              

 

             BASO % (test code = 0.3 %                                  



             706-2)                                              

 

             GRAN MAT x10^3(ANC) 9.63 10*3/uL 1.99-6.95    H            



             (test code =                                        



             1324273158)                                         

 

             IMM GRAN x10^3 (test 0.04 10*3/uL 0.00-0.06                 



             code = 0880575396)                                        

 

             LYMPH x10^3 (test code 1.77 10*3/uL 1.09-3.23                 



             = 731-0)                                            

 

             MONO x10^3 (test code 0.97 10*3/uL 0.36-1.02                 



             = 742-7)                                            

 

             EOS x10^3 (test code = 0.23 10*3/uL 0.06-0.53                 



             711-2)                                              

 

             BASO x10^3 (test code 0.04 10*3/uL 0.01-0.09                 



             = 704-7)                                            

 

             Lab Interpretation Abnormal                               



             (test code = 24545-7)                                        



[facility] GLUCOSE(AGE &gt;30DAYS)2023 
20:33:00





             Test Item    Value        Reference Range Interpretation Comments

 

             POCT Glu (age>30days) (test code = 429 mg/dL           A     

       



             3342)                                               

 

             Lab Interpretation (test code = Abnormal                           

    



             72787-8)                                            



[facility] GLUCOSE (AUTOMATED)2023 22:47:23





             Test Item    Value        Reference Range Interpretation Comments

 

             POCT GLU (test code = 3901367407) 352 mg/dL           H      

      

 

             Lab Interpretation (test code = Abnormal                           

    



             95389-4)                                            



[facility] GLUCOSE (AUTOMATED)2023 19:38:18





             Test Item    Value        Reference Range Interpretation Comments

 

             POCT GLU (test code = 0416537154) 452 mg/dL           HH     

      

 

             Lab Interpretation (test code = Abnormal                           

    



             37667-9)                                            



[facility] GLUCOSE (AUTOMATED)2023-04-10 02:41:30





             Test Item    Value        Reference Range Interpretation Comments

 

             POCT GLU (test code = 9784794260) 379 mg/dL           H      

      

 

             Lab Interpretation (test code = Abnormal                           

    



             10057-9)                                            



[facility]. METABOLIC PANEL (21707)2023-04-10 
00:29:35





             Test Item    Value        Reference Range Interpretation Comments

 

             NA (test code = 133 mmol/L   135-145      L            



             7464675063)                                         

 

             K (test code = 3.5 mmol/L   3.5-5.0                   



             5880649854)                                         

 

             CL (test code = 97 mmol/L           L            



             4897825049)                                         

 

             CO2 TOTAL (test code = 26 mmol/L    23-31                     



             0471938879)                                         

 

             AGAP (test code = 10           2-16                      



             3391028247)                                         

 

             BUN (test code = 7 mg/dL      7-23                      



             5057288197)                                         

 

             GLUCOSE (test code = 468 mg/dL           HH           



             6028887121)                                         

 

             CREATININE (test code = 0.58 mg/dL   0.60-1.25    L            



             8894726209)                                         

 

             TOTAL BILI (test code = 0.6 mg/dL    0.1-1.1                   



             2868550029)                                         

 

             CALCIUM (test code = 9.5 mg/dL    8.6-10.6                  



             8454016110)                                         

 

             T PROTEIN (test code = 7.6 g/dL     6.3-8.2                   



             1082433813)                                         

 

             ALBUMIN (test code = 4.2 g/dL     3.5-5.0                   



             8621402136)                                         

 

             ALK PHOS (test code = 197 U/L             H            



             2250750198)                                         

 

             ALTv (test code = 56 U/L       5-50         H            



             1742-6)                                             

 

             AST(SGOT) (test code = 16 U/L       13-40                     



             5679466230)                                         

 

             eGFR (test code = 157.7        mL/min/1.73m2              



             3521595580)                                         

 

             MAL (test code = MAL) Association of                           



                          Glomerular Filtration                           



                          Rate (GFR) and Staging                           



                          of Kidney Disease*                           



                          +---------------------                           



                          --+-------------------                           



                          --+-------------------                           



                          ------+| GFR                           



                          (mL/min/1.73 m2) ?|                           



                          With Kidney Damage ?|                           



                          ?Without Kidney                           



                          Damage+---------------                           



                          --------+-------------                           



                          --------+-------------                           



                          ------------+| ?>90 ?                           



                          ? ? ? ? ? ? ? ?|                           



                          ?Stage one ? ? ? ? ?|                           



                          ? Normal ? ? ? ? ? ? ?                           



                          ?+--------------------                           



                          ---+------------------                           



                          ---+------------------                           



                          -------+| ?60-89 ? ? ?                           



                          ? ? ? ? ?| ?Stage two                           



                          ? ? ? ? ?| ? Decreased                           



                          GFR ? ? ? ?                            



                          +---------------------                           



                          --+-------------------                           



                          --+-------------------                           



                          ------+| ?30-59 ? ? ?                           



                          ? ? ? ? ?| ?Stage                           



                          three ? ? ? ?| ? Stage                           



                          three ? ? ? ? ?                           



                          +---------------------                           



                          --+-------------------                           



                          --+-------------------                           



                          ------+| ?15-29 ? ? ?                           



                          ? ? ? ? ?| ?Stage four                           



                          ? ? ? ? | ? Stage four                           



                          ? ? ? ? ?                              



                          ?+--------------------                           



                          ---+------------------                           



                          ---+------------------                           



                          -------+| ?<15 (or                           



                          dialysis) ? ?| ?Stage                           



                          five ? ? ? ? | ? Stage                           



                          five ? ? ? ? ?                           



                          ?+--------------------                           



                          ---+------------------                           



                          ---+------------------                           



                          -------+ *Each stage                           



                          assumes the associated                           



                          GFR level has been in                           



                          effect for at least                           



                          three months. ?Stages                           



                          1 to 5, with or                           



                          without kidney                           



                          disease, indicate                           



                          chronic kidney                           



                          disease. Notes:                           



                          Determination of                           



                          stages one and two                           



                          (with eGFR                             



                          >59mL/min/1.73 m2)                           



                          requires estimation of                           



                          kidney damage for at                           



                          least three months as                           



                          defined by structural                           



                          or functional                           



                          abnormalities of the                           



                          kidney, manifested by                           



                          either:Pathological                           



                          abnormalities or                           



                          Markers of kidney                           



                          damage (including                           



                          abnormalities in the                           



                          composition of the                           



                          blood or urine or                           



                          abnormalities in                           



                          imaging tests).                           

 

             Lab Interpretation Abnormal                               



             (test code = 95486-9)                                        



[id number]





             Test Item    Value        Reference Range Interpretation Comments

 

             PH (test code = 2) 7.37         7.35-7.45                 

 

             PCO2 (test code = 47           See_Comment  H             [Automate

d



             9981902920)                                         message] The sy

stem



                                                                 which generated



                                                                 this result



                                                                 transmitted



                                                                 reference range

: 35



                                                                 - 45 mmHg. The



                                                                 reference range

 was



                                                                 not used to



                                                                 interpret this



                                                                 result as



                                                                 normal/abnormal

.

 

             PO2 (test code = 25           See_Comment  LL            [Automated



             6866790246)                                         message] The sy

stem



                                                                 which generated



                                                                 this result



                                                                 transmitted



                                                                 reference range

: 80



                                                                 - 100 mmHg. The



                                                                 reference range

 was



                                                                 not used to



                                                                 interpret this



                                                                 result as



                                                                 normal/abnormal

.

 

             HCO3 (test code = 26           See_Comment                [Automate

d



             2505431446)                                         message] The sy

stem



                                                                 which generated



                                                                 this result



                                                                 transmitted



                                                                 reference range

: 22



                                                                 - 26 mEq/L. The



                                                                 reference range

 was



                                                                 not used to



                                                                 interpret this



                                                                 result as



                                                                 normal/abnormal

.

 

             BE (test code = 0.3          See_Comment                [Automated



             4757871117)                                         message] The sy

stem



                                                                 which generated



                                                                 this result



                                                                 transmitted



                                                                 reference range

:



                                                                 -3.0 - 3.0 mEq/

L.



                                                                 The reference r

zhen



                                                                 was not used to



                                                                 interpret this



                                                                 result as



                                                                 normal/abnormal

.

 

             THB (test code = 18.4 g/dL    13.5-18.0    H            



             0232507771)                                         

 

             %O2HB (test code = 51.3 %       94.0-99.0    L            



             6667759747)                                         

 

             %COHB ART (test code = 4.3 %        0.0-1.5      H            



             9662959006)                                         

 

             %METHB ART (test code = 0.0 %        0.4-1.5      L            



             1300743522)                                         

 

             VOL%O2 ART (test code = 13.2 %       15.0-23.0    L            



             1346250310)                                         

 

             NA (test code = 136 mmol/L   135-145                   



             1226028292)                                         

 

             K+ (test code = 3.7 mmol/L   3.5-5.0                   



             0350782214)                                         

 

             AC CA IONZ (test code = 4.70 mg/dL   4.50-5.30                 



             3853222186)                                         

 

             GLUCOSE (test code = 471 mg/dL           HH           



             7014666547)                                         

 

             LACTIC ACID (test code 2.17 mmol/L  0.50-2.20                 



             = 7463873067)                                        

 

             Lab Interpretation Abnormal                               



             (test code = 57978-2)                                        



[facility] WITH [id number] 23:52:19





             Test Item    Value        Reference Range Interpretation Comments

 

             WBC (test code = 9.50         See_Comment                [Automated



             2498-2)                                             message] The sy

stem



                                                                 which generated



                                                                 this result



                                                                 transmitted



                                                                 reference range

:



                                                                 4.20 - 10.70



                                                                 10*3/?L. The



                                                                 reference range

 was



                                                                 not used to



                                                                 interpret this



                                                                 result as



                                                                 normal/abnormal

.

 

             RBC (test code = 5.27         See_Comment                [Automated



             426-8)                                              message] The sy

stem



                                                                 which generated



                                                                 this result



                                                                 transmitted



                                                                 reference range

:



                                                                 4.26 - 5.52



                                                                 10*6/?L. The



                                                                 reference range

 was



                                                                 not used to



                                                                 interpret this



                                                                 result as



                                                                 normal/abnormal

.

 

             HGB (test code = 15.9 g/dL    12.2-16.4                 



             718-7)                                              

 

             HCT (test code = 46.1 %       38.4-49.3                 



             4544-3)                                             

 

             MCV (test code = 87.5 fL      81.7-95.6                 



             787-2)                                              

 

             MCH (test code = 30.2 pg      26.1-32.7                 



             785-6)                                              

 

             MCHC (test code = 34.5 g/dL    31.2-35.0                 



             786-4)                                              

 

             RDW-SD (test code = 41.8 fL      38.5-51.6                 



             00532-4)                                            

 

             RDW-CV (test code = 13.2 %       12.1-15.4                 



             788-0)                                              

 

             PLT (test code = 369          See_Comment  H             [Automated



             777-3)                                              message] The sy

stem



                                                                 which generated



                                                                 this result



                                                                 transmitted



                                                                 reference range

:



                                                                 150 - 328 10*3/

?L.



                                                                 The reference r

zhen



                                                                 was not used to



                                                                 interpret this



                                                                 result as



                                                                 normal/abnormal

.

 

             MPV (test code = 10.3 fL      9.8-13.0                  



             19894-5)                                            

 

             NRBC/100 WBC (test 0.0          See_Comment                [Automat

ed



             code = 2322844866)                                        message] 

The system



                                                                 which generated



                                                                 this result



                                                                 transmitted



                                                                 reference range

:



                                                                 0.0 - 10.0 /100



                                                                 WBCs. The refer

ence



                                                                 range was not u

sed



                                                                 to interpret th

is



                                                                 result as



                                                                 normal/abnormal

.

 

             NRBC x10^3 (test code              See_Comment                [Auto

mated



             = [id number])                                        message] The s

ystem



                                                                 which generated



                                                                 this result



                                                                 transmitted



                                                                 reference range

:



                                                                 10*3/?L. The



                                                                 reference range

 was



                                                                 not used to



                                                                 interpret this



                                                                 result as



                                                                 normal/abnormal

.

 

             GRAN MAT (NEUT) % 65.5 %                                 



             (test code = 770-8)                                        

 

             IMM GRAN % (test code 0.70 %                                 



             = 3985964016)                                        

 

             LYMPH % (test code = 24.9 %                                 



             736-9)                                              

 

             MONO % (test code = 7.8 %                                  



             5905-5)                                             

 

             EOS % (test code = 0.8 %                                  



             713-8)                                              

 

             BASO % (test code = 0.3 %                                  



             706-2)                                              

 

             GRAN MAT x10^3(ANC) 6.21 10*3/uL 1.99-6.95                 



             (test code =                                        



             5540495443)                                         

 

             IMM GRAN x10^3 (test 0.07 10*3/uL 0.00-0.06    H            



             code = 3268166136)                                        

 

             LYMPH x10^3 (test code 2.37 10*3/uL 1.09-3.23                 



             = 731-0)                                            

 

             MONO x10^3 (test code 0.74 10*3/uL 0.36-1.02                 



             = 742-7)                                            

 

             EOS x10^3 (test code = 0.08 10*3/uL 0.06-0.53                 



             711-2)                                              

 

             BASO x10^3 (test code 0.03 10*3/uL 0.01-0.09                 



             = 704-7)                                            

 

             Lab Interpretation Abnormal                               



             (test code = 31365-3)                                        



[facility]





             Test Item    Value        Reference Range Interpretation Comments

 

             POCT GLU (test code = 0934632900) 353 mg/dL           H      

      

 

             Lab Interpretation (test code = Abnormal                           

    



             67667-2)                                            



[facility]. METABOLIC PANEL ([id number])2023 
22:59:53





             Test Item    Value        Reference Range Interpretation Comments

 

             NA (test code = 137 mmol/L   135-145                   



             0449497258)                                         

 

             K (test code = 5.1 mmol/L   3.5-5.0      H            



             1584738598)                                         

 

             CL (test code = 101 mmol/L                       



             1063363724)                                         

 

             CO2 TOTAL (test code = 23 mmol/L    23-31                     



             4179828310)                                         

 

             AGAP (test code = 13           2-16                      



             8321729168)                                         

 

             BUN (test code = 15 mg/dL     7-23                      



             3600063771)                                         

 

             GLUCOSE (test code = 455 mg/dL           HH           



             7837079312)                                         

 

             CREATININE (test code = 0.51 mg/dL   0.60-1.25    L            



             3961594547)                                         

 

             TOTAL BILI (test code = 0.7 mg/dL    0.1-1.1                   



             2126740778)                                         

 

             CALCIUM (test code = 9.8 mg/dL    8.6-10.6                  



             5348183721)                                         

 

             T PROTEIN (test code = 8.1 g/dL     6.3-8.2                   



             4917934266)                                         

 

             ALBUMIN (test code = 4.3 g/dL     3.5-5.0                   



             9747112076)                                         

 

             ALK PHOS (test code = 161 U/L             H            



             4180503128)                                         

 

             ALTv (test code = 34 U/L       5-50                      



             1742-6)                                             

 

             AST(SGOT) (test code = 26 U/L       13-40                     



             2213059208)                                         

 

             eGFR (test code = 182.9        mL/min/1.73m2              



             4017698889)                                         

 

             MAL (test code = MAL) Association of                           



                          Glomerular Filtration                           



                          Rate (GFR) and Staging                           



                          of Kidney Disease*                           



                          +---------------------                           



                          --+-------------------                           



                          --+-------------------                           



                          ------+| GFR                           



                          (mL/min/1.73 m2) ?|                           



                          With Kidney Damage ?|                           



                          ?Without Kidney                           



                          Damage+---------------                           



                          --------+-------------                           



                          --------+-------------                           



                          ------------+| ?>90 ?                           



                          ? ? ? ? ? ? ? ?|                           



                          ?Stage one ? ? ? ? ?|                           



                          ? Normal ? ? ? ? ? ? ?                           



                          ?+--------------------                           



                          ---+------------------                           



                          ---+------------------                           



                          -------+| ?60-89 ? ? ?                           



                          ? ? ? ? ?| ?Stage two                           



                          ? ? ? ? ?| ? Decreased                           



                          GFR ? ? ? ?                            



                          +---------------------                           



                          --+-------------------                           



                          --+-------------------                           



                          ------+| ?30-59 ? ? ?                           



                          ? ? ? ? ?| ?Stage                           



                          three ? ? ? ?| ? Stage                           



                          three ? ? ? ? ?                           



                          +---------------------                           



                          --+-------------------                           



                          --+-------------------                           



                          ------+| ?15-29 ? ? ?                           



                          ? ? ? ? ?| ?Stage four                           



                          ? ? ? ? | ? Stage four                           



                          ? ? ? ? ?                              



                          ?+--------------------                           



                          ---+------------------                           



                          ---+------------------                           



                          -------+| ?<15 (or                           



                          dialysis) ? ?| ?Stage                           



                          five ? ? ? ? | ? Stage                           



                          five ? ? ? ? ?                           



                          ?+--------------------                           



                          ---+------------------                           



                          ---+------------------                           



                          -------+ *Each stage                           



                          assumes the associated                           



                          GFR level has been in                           



                          effect for at least                           



                          three months. ?Stages                           



                          1 to 5, with or                           



                          without kidney                           



                          disease, indicate                           



                          chronic kidney                           



                          disease. Notes:                           



                          Determination of                           



                          stages one and two                           



                          (with eGFR                             



                          >59mL/min/1.73 m2)                           



                          requires estimation of                           



                          kidney damage for at                           



                          least three months as                           



                          defined by structural                           



                          or functional                           



                          abnormalities of the                           



                          kidney, manifested by                           



                          either:Pathological                           



                          abnormalities or                           



                          Markers of kidney                           



                          damage (including                           



                          abnormalities in the                           



                          composition of the                           



                          blood or urine or                           



                          abnormalities in                           



                          imaging tests).                           

 

             Lab Interpretation Abnormal                               



             (test code = 64488-6)                                        



[facility]





             Test Item    Value        Reference Range Interpretation Comments

 

             MAGNESIUM (test code = 1785307157) 1.7 mg/dL    1.7-2.4            

       

 

             Lab Interpretation (test code = Normal                             

    



             40531-4)                                            



[facility]





             Test Item    Value        Reference Range Interpretation Comments

 

             LIPASE (test code = 7825829575) 106 U/L      0-220                 

    

 

             Lab Interpretation (test code = Normal                             

    



             71619-4)                                            



[facility]17





             Test Item    Value        Reference Range Interpretation Comments

 

             POCT GLU (test code = 9274660841) 425 mg/dL           H      

      

 

             Lab Interpretation (test code = Abnormal                           

    



             62370-9)                                            



[id number] 22:26:01





             Test Item    Value        Reference Range Interpretation Comments

 

             LACTIC ACID (test code = 2.67 mmol/L  0.50-2.20    H            



             7159079974)                                         

 

             Lab Interpretation (test code = Abnormal                           

    



             86781-9)                                            



[facility] WITH VARR0427-04-96 22:24:50





             Test Item    Value        Reference Range Interpretation Comments

 

             WBC (test code = 12.05        See_Comment  H             [Automated



             6690-2)                                             message] The sy

stem



                                                                 which generated



                                                                 this result



                                                                 transmitted



                                                                 reference range

:



                                                                 4.20 - 10.70



                                                                 10*3/?L. The



                                                                 reference range

 was



                                                                 not used to



                                                                 interpret this



                                                                 result as



                                                                 normal/abnormal

.

 

             RBC (test code = 5.14         See_Comment                [Automated



             789-8)                                              message] The sy

stem



                                                                 which generated



                                                                 this result



                                                                 transmitted



                                                                 reference range

:



                                                                 4.26 - 5.52



                                                                 10*6/?L. The



                                                                 reference range

 was



                                                                 not used to



                                                                 interpret this



                                                                 result as



                                                                 normal/abnormal

.

 

             HGB (test code = 15.4 g/dL    12.2-16.4                 



             718-7)                                              

 

             HCT (test code = 45.6 %       38.4-49.3                 



             4544-3)                                             

 

             MCV (test code = 88.7 fL      81.7-95.6                 



             787-2)                                              

 

             MCH (test code = 30.0 pg      26.1-32.7                 



             785-6)                                              

 

             MCHC (test code = 33.8 g/dL    31.2-35.0                 



             786-4)                                              

 

             RDW-SD (test code = 45.1 fL      38.5-51.6                 



             23865-2)                                            

 

             RDW-CV (test code = 14.0 %       12.1-15.4                 



             788-0)                                              

 

             PLT (test code = 518          See_Comment  H             [Automated



             777-3)                                              message] The sy

stem



                                                                 which generated



                                                                 this result



                                                                 transmitted



                                                                 reference range

:



                                                                 150 - 328 10*3/

?L.



                                                                 The reference r

zhen



                                                                 was not used to



                                                                 interpret this



                                                                 result as



                                                                 normal/abnormal

.

 

             MPV (test code = 10.2 fL      9.8-13.0                  



             58003-9)                                            

 

             NRBC/100 WBC (test 0.0          See_Comment                [Automat

ed



             code = 4092245208)                                        message] 

The system



                                                                 which generated



                                                                 this result



                                                                 transmitted



                                                                 reference range

:



                                                                 0.0 - 10.0 /100



                                                                 WBCs. The refer

ence



                                                                 range was not u

sed



                                                                 to interpret th

is



                                                                 result as



                                                                 normal/abnormal

.

 

             NRBC x10^3 (test code              See_Comment                [Auto

mated



             = 8882016938)                                        message] The s

ystem



                                                                 which generated



                                                                 this result



                                                                 transmitted



                                                                 reference range

:



                                                                 10*3/?L. The



                                                                 reference range

 was



                                                                 not used to



                                                                 interpret this



                                                                 result as



                                                                 normal/abnormal

.

 

             GRAN MAT (NEUT) % 76.2 %                                 



             (test code = 770-8)                                        

 

             IMM GRAN % (test code 0.50 %                                 



             = 0735201006)                                        

 

             LYMPH % (test code = 16.8 %                                 



             736-9)                                              

 

             MONO % (test code = 5.2 %                                  



             5905-5)                                             

 

             EOS % (test code = 1.0 %                                  



             713-8)                                              

 

             BASO % (test code = 0.3 %                                  



             706-2)                                              

 

             GRAN MAT x10^3(ANC) 9.18 10*3/uL 1.99-6.95    H            



             (test code =                                        



             0180859669)                                         

 

             IMM GRAN x10^3 (test 0.06 10*3/uL 0.00-0.06                 



             code = 2512830525)                                        

 

             LYMPH x10^3 (test code 2.02 10*3/uL 1.09-3.23                 



             = 731-0)                                            

 

             MONO x10^3 (test code 0.63 10*3/uL 0.36-1.02                 



             = 742-7)                                            

 

             EOS x10^3 (test code = 0.12 10*3/uL 0.06-0.53                 



             711-2)                                              

 

             BASO x10^3 (test code 0.04 10*3/uL 0.01-0.09                 



             = 704-7)                                            

 

             Lab Interpretation Abnormal                               



             (test code = 50436-0)                                        



[facility] GLUCOSE (AUTOMATED)2023 18:23:19





             Test Item    Value        Reference Range Interpretation Comments

 

             POCT GLU (test code = 2652120705) 352 mg/dL           H      

      

 

             Lab Interpretation (test code = Abnormal                           

    



             03775-1)                                            



[facility] GLUCOSE (AUTOMATED)2023 18:23:19





             Test Item    Value        Reference Range Interpretation Comments

 

             POCT GLU (test code = 2848807882) 352 mg/dL           H      

      

 

             Lab Interpretation (test code = Abnormal                           

    



             67684-4)                                            



[facility] GLUCOSE (AUTOMATED)2023 13:43:13





             Test Item    Value        Reference Range Interpretation Comments

 

             POCT GLU (test code = 5536656204) 293 mg/dL           H      

      

 

             Lab Interpretation (test code = Abnormal                           

    



             59710-1)                                            



[facility] GLUCOSE (AUTOMATED)2023 13:43:13





             Test Item    Value        Reference Range Interpretation Comments

 

             POCT GLU (test code = 4099023812) 293 mg/dL           H      

      

 

             Lab Interpretation (test code = Abnormal                           

    



             06238-1)                                            



[facility]





             Test Item    Value        Reference Range Interpretation Comments

 

             POCT GLU (test code = 0393960755) 259 mg/dL           H      

      

 

             Lab Interpretation (test code = Abnormal                           

    



             00295-2)                                            



[facility] GLUCOSE (AUTOMATED)2023 02:50:04





             Test Item    Value        Reference Range Interpretation Comments

 

             POCT GLU (test code = 3641246186) 259 mg/dL           H      

      

 

             Lab Interpretation (test code = Abnormal                           

    



             52033-4)                                            



[facility] GLUCOSE (AUTOMATED)2023 22:58:25





             Test Item    Value        Reference Range Interpretation Comments

 

             POCT GLU (test code = 4210580848) 170 mg/dL           H      

      

 

             Lab Interpretation (test code = Abnormal                           

    



             00975-5)                                            



[facility] GLUCOSE (AUTOMATED)2023 22:58:25





             Test Item    Value        Reference Range Interpretation Comments

 

             POCT GLU (test code = 0164007155) 170 mg/dL           H      

      

 

             Lab Interpretation (test code = Abnormal                           

    



             85869-8)                                            



[facility] GLUCOSE (AUTOMATED)2023 21:27:00





             Test Item    Value        Reference Range Interpretation Comments

 

             POCT GLU (test code = 9453764087) 179 mg/dL           H      

      

 

             Lab Interpretation (test code = Abnormal                           

    



             11104-4)                                            



[facility] GLUCOSE (AUTOMATED)2023 21:27:00





             Test Item    Value        Reference Range Interpretation Comments

 

             POCT GLU (test code = 5881151586) 179 mg/dL           H      

      

 

             Lab Interpretation (test code = Abnormal                           

    



             98323-5)                                            



[facility] GLUCOSE (AUTOMATED)2023 18:18:50





             Test Item    Value        Reference Range Interpretation Comments

 

             POCT GLU (test code = 4196553731) 247 mg/dL           H      

      

 

             Lab Interpretation (test code = Abnormal                           

    



             57834-9)                                            



[facility] GLUCOSE (AUTOMATED)2023 18:18:50





             Test Item    Value        Reference Range Interpretation Comments

 

             POCT GLU (test code = 3426838821) 247 mg/dL           H      

      

 

             Lab Interpretation (test code = Abnormal                           

    



             16957-9)                                            



[facility]





             Test Item    Value        Reference Range Interpretation Comments

 

             POCT GLU (test code = 3290618739) 235 mg/dL           H      

      

 

             Lab Interpretation (test code = Abnormal                           

    



             59166-4)                                            



[facility] GLUCOSE (AUTOMATED)2023 15:21:08





             Test Item    Value        Reference Range Interpretation Comments

 

             POCT GLU (test code = 1419562910) 235 mg/dL           H      

      

 

             Lab Interpretation (test code = Abnormal                           

    



             12857-9)                                            



[facility] GLUCOSE (AUTOMATED)2023 03:11:07





             Test Item    Value        Reference Range Interpretation Comments

 

             POCT GLU (test code = 6892031724) 263 mg/dL           H      

      

 

             Lab Interpretation (test code = Abnormal                           

    



             87833-9)                                            



[facility] GLUCOSE (AUTOMATED)2023 03:11:07





             Test Item    Value        Reference Range Interpretation Comments

 

             POCT GLU (test code = 2834490019) 263 mg/dL           H      

      

 

             Lab Interpretation (test code = Abnormal                           

    



             41939-4)                                            



[facility] GLUCOSE (AUTOMATED)2023 22:43:48





             Test Item    Value        Reference Range Interpretation Comments

 

             POCT GLU (test code = 4389553471) 262 mg/dL           H      

      

 

             Lab Interpretation (test code = Abnormal                           

    



             14637-0)                                            



[facility] GLUCOSE (AUTOMATED)2023 22:43:48





             Test Item    Value        Reference Range Interpretation Comments

 

             POCT GLU (test code = 5765588749) 262 mg/dL           H      

      

 

             Lab Interpretation (test code = Abnormal                           

    



             73021-8)                                            



[facility] GLUCOSE (AUTOMATED)2023 17:36:05





             Test Item    Value        Reference Range Interpretation Comments

 

             POCT GLU (test code = 7937864586) 365 mg/dL           H      

      

 

             Lab Interpretation (test code = Abnormal                           

    



             27487-1)                                            



[facility]





             Test Item    Value        Reference Range Interpretation Comments

 

             POCT GLU (test code = 9472046138) 365 mg/dL           H      

      

 

             Lab Interpretation (test code = Abnormal                           

    



             00468-9)                                            



[facility] GLUCOSE (AUTOMATED)2023 16:43:12





             Test Item    Value        Reference Range Interpretation Comments

 

             POCT GLU (test code = 5442546019) 408 mg/dL           H      

      

 

             Lab Interpretation (test code = Abnormal                           

    



             06101-6)                                            



[facility] GLUCOSE (AUTOMATED)2023 16:43:12





             Test Item    Value        Reference Range Interpretation Comments

 

             POCT GLU (test code = 0927341371) 408 mg/dL           H      

      

 

             Lab Interpretation (test code = Abnormal                           

    



             05220-9)                                            



[facility] GLUCOSE (AUTOMATED)2023 13:20:30





             Test Item    Value        Reference Range Interpretation Comments

 

             POCT GLU (test code = 1573121907) 359 mg/dL           H      

      

 

             Lab Interpretation (test code = Abnormal                           

    



             93641-3)                                            



[facility] GLUCOSE (AUTOMATED)2023 13:20:30





             Test Item    Value        Reference Range Interpretation Comments

 

             POCT GLU (test code = 6082497542) 359 mg/dL           H      

      

 

             Lab Interpretation (test code = Abnormal                           

    



             54073-7)                                            



[facility] GLUCOSE (AUTOMATED)2023 08:27:11





             Test Item    Value        Reference Range Interpretation Comments

 

             POCT GLU (test code = 3787636336) 312 mg/dL           H      

      

 

             Lab Interpretation (test code = Abnormal                           

    



             15667-5)                                            



[facility] GLUCOSE (AUTOMATED)2023 08:27:11





             Test Item    Value        Reference Range Interpretation Comments

 

             POCT GLU (test code = 3508422342) 312 mg/dL           H      

      

 

             Lab Interpretation (test code = Abnormal                           

    



             09012-8)                                            



[facility] GLUCOSE (AUTOMATED)2023 06:47:03





             Test Item    Value        Reference Range Interpretation Comments

 

             POCT GLU (test code = 0255468882) 326 mg/dL           H      

      

 

             Lab Interpretation (test code = Abnormal                           

    



             73468-9)                                            



[facility] GLUCOSE (AUTOMATED)2023 06:47:03





             Test Item    Value        Reference Range Interpretation Comments

 

             POCT GLU (test code = 2440263042) 326 mg/dL           H      

      

 

             Lab Interpretation (test code = Abnormal                           

    



             89011-3)                                            



[facility] METABOLIC PANEL (NA, K, CL, CO2, 
GLUCOSE, BUN, CREATININE, CA)2023 06:26:05





             Test Item    Value        Reference Range Interpretation Comments

 

             NA (test code = 132 mmol/L   135-145      L            



             1941460875)                                         

 

             K (test code = 4.4 mmol/L   3.5-5.0                   



             3399590173)                                         

 

             CL (test code = 101 mmol/L                       



             1772931229)                                         

 

             CO2 TOTAL (test code = 17 mmol/L    23-31        L            



             3160921712)                                         

 

             AGAP (test code = 14           2-16                      



             4802971546)                                         

 

             BUN (test code = 11 mg/dL     7-23                      



             5842059244)                                         

 

             GLUCOSE (test code = 346 mg/dL           H            



             9074080744)                                         

 

             CREATININE (test code = 0.54 mg/dL   0.60-1.25    L            



             9985940853)                                         

 

             CALCIUM (test code = 8.5 mg/dL    8.6-10.6     L            



             9299232441)                                         

 

             eGFR (test code = 171.2        mL/min/1.73m2              



             5780621712)                                         

 

             MAL (test code = MAL) Association of                           



                          Glomerular Filtration                           



                          Rate (GFR) and Staging                           



                          of Kidney Disease*                           



                          +---------------------                           



                          --+-------------------                           



                          --+-------------------                           



                          ------+| GFR                           



                          (mL/min/1.73 m2) ?|                           



                          With Kidney Damage ?|                           



                          ?Without Kidney                           



                          Damage+---------------                           



                          --------+-------------                           



                          --------+-------------                           



                          ------------+| ?>90 ?                           



                          ? ? ? ? ? ? ? ?|                           



                          ?Stage one ? ? ? ? ?|                           



                          ? Normal ? ? ? ? ? ? ?                           



                          ?+--------------------                           



                          ---+------------------                           



                          ---+------------------                           



                          -------+| ?60-89 ? ? ?                           



                          ? ? ? ? ?| ?Stage two                           



                          ? ? ? ? ?| ? Decreased                           



                          GFR ? ? ? ?                            



                          +---------------------                           



                          --+-------------------                           



                          --+-------------------                           



                          ------+| ?30-59 ? ? ?                           



                          ? ? ? ? ?| ?Stage                           



                          three ? ? ? ?| ? Stage                           



                          three ? ? ? ? ?                           



                          +---------------------                           



                          --+-------------------                           



                          --+-------------------                           



                          ------+| ?15-29 ? ? ?                           



                          ? ? ? ? ?| ?Stage four                           



                          ? ? ? ? | ? Stage four                           



                          ? ? ? ? ?                              



                          ?+--------------------                           



                          ---+------------------                           



                          ---+------------------                           



                          -------+| ?<15 (or                           



                          dialysis) ? ?| ?Stage                           



                          five ? ? ? ? | ? Stage                           



                          five ? ? ? ? ?                           



                          ?+--------------------                           



                          ---+------------------                           



                          ---+------------------                           



                          -------+ *Each stage                           



                          assumes the associated                           



                          GFR level has been in                           



                          effect for at least                           



                          three months. ?Stages                           



                          1 to 5, with or                           



                          without kidney                           



                          disease, indicate                           



                          chronic kidney                           



                          disease. Notes:                           



                          Determination of                           



                          stages one and two                           



                          (with eGFR                             



                          >59mL/min/1.73 m2)                           



                          requires estimation of                           



                          kidney damage for at                           



                          least three months as                           



                          defined by structural                           



                          or functional                           



                          abnormalities of the                           



                          kidney, manifested by                           



                          either:Pathological                           



                          abnormalities or                           



                          Markers of kidney                           



                          damage (including                           



                          abnormalities in the                           



                          composition of the                           



                          blood or urine or                           



                          abnormalities in                           



                          imaging tests).                           

 

             Lab Interpretation Abnormal                               



             (test code = 83396-1)                                        



[facility] METABOLIC PANEL (NA, K, CL, CO2, 
GLUCOSE, BUN, CREATININE, CA)2023 06:26:05





             Test Item    Value        Reference Range Interpretation Comments

 

             NA (test code = 132 mmol/L   135-145      L            



             0592041443)                                         

 

             K (test code = 4.4 mmol/L   3.5-5.0                   



             5725188922)                                         

 

             CL (test code = 101 mmol/L                       



             1331127377)                                         

 

             CO2 TOTAL (test code = 17 mmol/L    23-31        L            



             2275736510)                                         

 

             AGAP (test code = 14           2-16                      



             5173756196)                                         

 

             BUN (test code = 11 mg/dL     7-23                      



             1368493962)                                         

 

             GLUCOSE (test code = 346 mg/dL           H            



             6573719907)                                         

 

             CREATININE (test code = 0.54 mg/dL   0.60-1.25    L            



             6691837094)                                         

 

             CALCIUM (test code = 8.5 mg/dL    8.6-10.6     L            



             1038820407)                                         

 

             eGFR (test code = 171.2        mL/min/1.73m2              



             7988797076)                                         

 

             MAL (test code = MAL) Association of                           



                          Glomerular Filtration                           



                          Rate (GFR) and Staging                           



                          of Kidney Disease*                           



                          +---------------------                           



                          --+-------------------                           



                          --+-------------------                           



                          ------+| GFR                           



                          (mL/min/1.73 m2) ?|                           



                          With Kidney Damage ?|                           



                          ?Without Kidney                           



                          Damage+---------------                           



                          --------+-------------                           



                          --------+-------------                           



                          ------------+| ?>90 ?                           



                          ? ? ? ? ? ? ? ?|                           



                          ?Stage one ? ? ? ? ?|                           



                          ? Normal ? ? ? ? ? ? ?                           



                          ?+--------------------                           



                          ---+------------------                           



                          ---+------------------                           



                          -------+| ?60-89 ? ? ?                           



                          ? ? ? ? ?| ?Stage two                           



                          ? ? ? ? ?| ? Decreased                           



                          GFR ? ? ? ?                            



                          +---------------------                           



                          --+-------------------                           



                          --+-------------------                           



                          ------+| ?30-59 ? ? ?                           



                          ? ? ? ? ?| ?Stage                           



                          three ? ? ? ?| ? Stage                           



                          three ? ? ? ? ?                           



                          +---------------------                           



                          --+-------------------                           



                          --+-------------------                           



                          ------+| ?15-29 ? ? ?                           



                          ? ? ? ? ?| ?Stage four                           



                          ? ? ? ? | ? Stage four                           



                          ? ? ? ? ?                              



                          ?+--------------------                           



                          ---+------------------                           



                          ---+------------------                           



                          -------+| ?<15 (or                           



                          dialysis) ? ?| ?Stage                           



                          five ? ? ? ? | ? Stage                           



                          five ? ? ? ? ?                           



                          ?+--------------------                           



                          ---+------------------                           



                          ---+------------------                           



                          -------+ *Each stage                           



                          assumes the associated                           



                          GFR level has been in                           



                          effect for at least                           



                          three months. ?Stages                           



                          1 to 5, with or                           



                          without kidney                           



                          disease, indicate                           



                          chronic kidney                           



                          disease. Notes:                           



                          Determination of                           



                          stages one and two                           



                          (with eGFR                             



                          >59mL/min/1.73 m2)                           



                          requires estimation of                           



                          kidney damage for at                           



                          least three months as                           



                          defined by structural                           



                          or functional                           



                          abnormalities of the                           



                          kidney, manifested by                           



                          either:Pathological                           



                          abnormalities or                           



                          Markers of kidney                           



                          damage (including                           



                          abnormalities in the                           



                          composition of the                           



                          blood or urine or                           



                          abnormalities in                           



                          imaging tests).                           

 

             Lab Interpretation Abnormal                               



             (test code = 84481-0)                                        



[facility] GLUCOSE (AUTOMATED)2023 05:38:22





             Test Item    Value        Reference Range Interpretation Comments

 

             POCT GLU (test code = 4312717203) 414 mg/dL           H      

      

 

             Lab Interpretation (test code = Abnormal                           

    



             37349-0)                                            



[facility] GLUCOSE (AUTOMATED)2023 05:38:22





             Test Item    Value        Reference Range Interpretation Comments

 

             POCT GLU (test code = 0402946737) 414 mg/dL           H      

      

 

             Lab Interpretation (test code = Abnormal                           

    



             32150-4)                                            



[facility] METABOLIC PANEL (NA, K, CL, CO2, 
GLUCOSE, BUN, CREATININE, CA)2023 03:58:07





             Test Item    Value        Reference Range Interpretation Comments

 

             NA (test code = 133 mmol/L   135-145      L            



             [id number])                                         

 

             K (test code = 4.7 mmol/L   3.5-5.0                   



             0712706805)                                         

 

             CL (test code = 100 mmol/L                       



             4613012235)                                         

 

             CO2 TOTAL (test code = 16 mmol/L    23-31        L            



             6910114019)                                         

 

             AGAP (test code = 17           2-16         H            



             0341693368)                                         

 

             BUN (test code = 12 mg/dL     7-23                      



             7217621931)                                         

 

             GLUCOSE (test code = 407 mg/dL           H            



             8720312092)                                         

 

             CREATININE (test code = 0.55 mg/dL   0.60-1.25    L            



             7720602149)                                         

 

             CALCIUM (test code = 9.2 mg/dL    8.6-10.6                  



             7996271731)                                         

 

             eGFR (test code = 167.6        mL/min/1.73m2              



             2364549196)                                         

 

             MAL (test code = MAL) Association of                           



                          Glomerular Filtration                           



                          Rate (GFR) and Staging                           



                          of Kidney Disease*                           



                          +---------------------                           



                          --+-------------------                           



                          --+-------------------                           



                          ------+| GFR                           



                          (mL/min/1.73 m2) ?|                           



                          With Kidney Damage ?|                           



                          ?Without Kidney                           



                          Damage+---------------                           



                          --------+-------------                           



                          --------+-------------                           



                          ------------+| ?>90 ?                           



                          ? ? ? ? ? ? ? ?|                           



                          ?Stage one ? ? ? ? ?|                           



                          ? Normal ? ? ? ? ? ? ?                           



                          ?+--------------------                           



                          ---+------------------                           



                          ---+------------------                           



                          -------+| ?60-89 ? ? ?                           



                          ? ? ? ? ?| ?Stage two                           



                          ? ? ? ? ?| ? Decreased                           



                          GFR ? ? ? ?                            



                          +---------------------                           



                          --+-------------------                           



                          --+-------------------                           



                          ------+| ?30-59 ? ? ?                           



                          ? ? ? ? ?| ?Stage                           



                          three ? ? ? ?| ? Stage                           



                          three ? ? ? ? ?                           



                          +---------------------                           



                          --+-------------------                           



                          --+-------------------                           



                          ------+| ?15-29 ? ? ?                           



                          ? ? ? ? ?| ?Stage four                           



                          ? ? ? ? | ? Stage four                           



                          ? ? ? ? ?                              



                          ?+--------------------                           



                          ---+------------------                           



                          ---+------------------                           



                          -------+| ?<15 (or                           



                          dialysis) ? ?| ?Stage                           



                          five ? ? ? ? | ? Stage                           



                          five ? ? ? ? ?                           



                          ?+--------------------                           



                          ---+------------------                           



                          ---+------------------                           



                          -------+ *Each stage                           



                          assumes the associated                           



                          GFR level has been in                           



                          effect for at least                           



                          three months. ?Stages                           



                          1 to 5, with or                           



                          without kidney                           



                          disease, indicate                           



                          chronic kidney                           



                          disease. Notes:                           



                          Determination of                           



                          stages one and two                           



                          (with eGFR                             



                          >59mL/min/1.73 m2)                           



                          requires estimation of                           



                          kidney damage for at                           



                          least three months as                           



                          defined by structural                           



                          or functional                           



                          abnormalities of the                           



                          kidney, manifested by                           



                          either:Pathological                           



                          abnormalities or                           



                          Markers of kidney                           



                          damage (including                           



                          abnormalities in the                           



                          composition of the                           



                          blood or urine or                           



                          abnormalities in                           



                          imaging tests).                           

 

             Lab Interpretation Abnormal                               



             (test code = 35715-2)                                        



[facility]NA, K, CL, CO2, 
GLUCOSE, BUN, CREATININE, CA)2023 03:58:07





             Test Item    Value        Reference Range Interpretation Comments

 

             NA (test code = 133 mmol/L   135-145      L            



             4920907658)                                         

 

             K (test code = 4.7 mmol/L   3.5-5.0                   



             1982915657)                                         

 

             CL (test code = 100 mmol/L                       



             3078608717)                                         

 

             CO2 TOTAL (test code = 16 mmol/L    23-31        L            



             3850183163)                                         

 

             AGAP (test code = 17           2-16         H            



             0085760055)                                         

 

             BUN (test code = 12 mg/dL     7-23                      



             4625937173)                                         

 

             GLUCOSE (test code = 407 mg/dL           H            



             3805329150)                                         

 

             CREATININE (test code = 0.55 mg/dL   0.60-1.25    L            



             5072728835)                                         

 

             CALCIUM (test code = 9.2 mg/dL    8.6-10.6                  



             7095378540)                                         

 

             eGFR (test code = 167.6        mL/min/1.73m2              



             8854784976)                                         

 

             MAL (test code = MAL) Association of                           



                          Glomerular Filtration                           



                          Rate (GFR) and Staging                           



                          of Kidney Disease*                           



                          +---------------------                           



                          --+-------------------                           



                          --+-------------------                           



                          ------+| GFR                           



                          (mL/min/1.73 m2) ?|                           



                          With Kidney Damage ?|                           



                          ?Without Kidney                           



                          Damage+---------------                           



                          --------+-------------                           



                          --------+-------------                           



                          ------------+| ?>90 ?                           



                          ? ? ? ? ? ? ? ?|                           



                          ?Stage one ? ? ? ? ?|                           



                          ? Normal ? ? ? ? ? ? ?                           



                          ?+--------------------                           



                          ---+------------------                           



                          ---+------------------                           



                          -------+| ?60-89 ? ? ?                           



                          ? ? ? ? ?| ?Stage two                           



                          ? ? ? ? ?| ? Decreased                           



                          GFR ? ? ? ?                            



                          +---------------------                           



                          --+-------------------                           



                          --+-------------------                           



                          ------+| ?30-59 ? ? ?                           



                          ? ? ? ? ?| ?Stage                           



                          three ? ? ? ?| ? Stage                           



                          three ? ? ? ? ?                           



                          +---------------------                           



                          --+-------------------                           



                          --+-------------------                           



                          ------+| ?15-29 ? ? ?                           



                          ? ? ? ? ?| ?Stage four                           



                          ? ? ? ? | ? Stage four                           



                          ? ? ? ? ?                              



                          ?+--------------------                           



                          ---+------------------                           



                          ---+------------------                           



                          -------+| ?<15 (or                           



                          dialysis) ? ?| ?Stage                           



                          five ? ? ? ? | ? Stage                           



                          five ? ? ? ? ?                           



                          ?+--------------------                           



                          ---+------------------                           



                          ---+------------------                           



                          -------+ *Each stage                           



                          assumes the associated                           



                          GFR level has been in                           



                          effect for at least                           



                          three months. ?Stages                           



                          1 to 5, with or                           



                          without kidney                           



                          disease, indicate                           



                          chronic kidney                           



                          disease. Notes:                           



                          Determination of                           



                          stages one and two                           



                          (with eGFR                             



                          >59mL/min/1.73 m2)                           



                          requires estimation of                           



                          kidney damage for at                           



                          least three months as                           



                          defined by structural                           



                          or functional                           



                          abnormalities of the                           



                          kidney, manifested by                           



                          either:Pathological                           



                          abnormalities or                           



                          Markers of kidney                           



                          damage (including                           



                          abnormalities in the                           



                          composition of the                           



                          blood or urine or                           



                          abnormalities in                           



                          imaging tests).                           

 

             Lab Interpretation Abnormal                               



             (test code = 75024-9)                                        



[facility] WITH [id number] 03:21:04





             Test Item    Value        Reference Range Interpretation Comments

 

             WBC (test code = 12.21        See_Comment  H             [Automated



             3860-2)                                             message] The sy

stem



                                                                 which generated



                                                                 this result



                                                                 transmitted



                                                                 reference range

:



                                                                 4.20 - 10.70



                                                                 10*3/?L. The



                                                                 reference range

 was



                                                                 not used to



                                                                 interpret this



                                                                 result as



                                                                 normal/abnormal

.

 

             RBC (test code = 5.59         See_Comment  H             [Automated



             379-8)                                              message] The sy

stem



                                                                 which generated



                                                                 this result



                                                                 transmitted



                                                                 reference range

:



                                                                 4.26 - 5.52



                                                                 10*6/?L. The



                                                                 reference range

 was



                                                                 not used to



                                                                 interpret this



                                                                 result as



                                                                 normal/abnormal

.

 

             HGB (test code = 16.3 g/dL    12.2-16.4                 



             718-7)                                              

 

             HCT (test code = 47.2 %       38.4-49.3                 



             4544-3)                                             

 

             MCV (test code = 84.4 fL      81.7-95.6                 



             787-2)                                              

 

             MCH (test code = 29.2 pg      26.1-32.7                 



             785-6)                                              

 

             MCHC (test code = 34.5 g/dL    31.2-35.0                 



             786-4)                                              

 

             RDW-SD (test code = 45.5 fL      38.5-51.6                 



             04777-2)                                            

 

             RDW-CV (test code = 15.1 %       12.1-15.4                 



             788-0)                                              

 

             PLT (test code = 344          See_Comment  H             [Automated



             777-3)                                              message] The sy

stem



                                                                 which generated



                                                                 this result



                                                                 transmitted



                                                                 reference range

:



                                                                 150 - 328 10*3/

?L.



                                                                 The reference r

zhen



                                                                 was not used to



                                                                 interpret this



                                                                 result as



                                                                 normal/abnormal

.

 

             MPV (test code = 10.5 fL      9.8-13.0                  



             16884-0)                                            

 

             NRBC/100 WBC (test 0.0          See_Comment                [Automat

ed



             code = 7813396489)                                        message] 

The system



                                                                 which generated



                                                                 this result



                                                                 transmitted



                                                                 reference range

:



                                                                 0.0 - 10.0 /100



                                                                 WBCs. The refer

ence



                                                                 range was not u

sed



                                                                 to interpret th

is



                                                                 result as



                                                                 normal/abnormal

.

 

             NRBC x10^3 (test code              See_Comment                [Auto

mated



             = [id number])                                        message] The s

ystem



                                                                 which generated



                                                                 this result



                                                                 transmitted



                                                                 reference range

:



                                                                 10*3/?L. The



                                                                 reference range

 was



                                                                 not used to



                                                                 interpret this



                                                                 result as



                                                                 normal/abnormal

.

 

             GRAN MAT (NEUT) % 73.0 %                                 



             (test code = 770-8)                                        

 

             IMM GRAN % (test code 0.70 %                                 



             = 3772094436)                                        

 

             LYMPH % (test code = 19.6 %                                 



             736-9)                                              

 

             MONO % (test code = 6.1 %                                  



             5905-5)                                             

 

             EOS % (test code = 0.3 %                                  



             713-8)                                              

 

             BASO % (test code = 0.3 %                                  



             706-2)                                              

 

             GRAN MAT x10^3(ANC) 8.91 10*3/uL 1.99-6.95    H            



             (test code =                                        



             3683857750)                                         

 

             IMM GRAN x10^3 (test 0.09 10*3/uL 0.00-0.06    H            



             code = [id number])                                        

 

             LYMPH x10^3 (test code 2.39 10*3/uL 1.09-3.23                 



             = 731-0)                                            

 

             MONO x10^3 (test code 0.74 10*3/uL 0.36-1.02                 



             = 742-7)                                            

 

             EOS x10^3 (test code = 0.04 10*3/uL 0.06-0.53    L            



             711-2)                                              

 

             BASO x10^3 (test code 0.04 10*3/uL 0.01-0.09                 



             = 704-7)                                            

 

             Lab Interpretation Abnormal                               



             (test code = 18890-4)                                        



[facility] WITH [id number] 03:21:04





             Test Item    Value        Reference Range Interpretation Comments

 

             WBC (test code = 12.21        See_Comment  H             [Automated



             2590-2)                                             message] The sy

stem



                                                                 which generated



                                                                 this result



                                                                 transmitted



                                                                 reference range

:



                                                                 4.20 - 10.70



                                                                 10*3/?L. The



                                                                 reference range

 was



                                                                 not used to



                                                                 interpret this



                                                                 result as



                                                                 normal/abnormal

.

 

             RBC (test code = 5.59         See_Comment  H             [Automated



             939-8)                                              message] The sy

stem



                                                                 which generated



                                                                 this result



                                                                 transmitted



                                                                 reference range

:



                                                                 4.26 - 5.52



                                                                 10*6/?L. The



                                                                 reference range

 was



                                                                 not used to



                                                                 interpret this



                                                                 result as



                                                                 normal/abnormal

.

 

             HGB (test code = 16.3 g/dL    12.2-16.4                 



             718-7)                                              

 

             HCT (test code = 47.2 %       38.4-49.3                 



             4544-3)                                             

 

             MCV (test code = 84.4 fL      81.7-95.6                 



             787-2)                                              

 

             MCH (test code = 29.2 pg      26.1-32.7                 



             785-6)                                              

 

             MCHC (test code = 34.5 g/dL    31.2-35.0                 



             786-4)                                              

 

             RDW-SD (test code = 45.5 fL      38.5-51.6                 



             52343-7)                                            

 

             RDW-CV (test code = 15.1 %       12.1-15.4                 



             788-0)                                              

 

             PLT (test code = 344          See_Comment  H             [Automated



             777-3)                                              message] The sy

stem



                                                                 which generated



                                                                 this result



                                                                 transmitted



                                                                 reference range

:



                                                                 150 - 328 10*3/

?L.



                                                                 The reference r

zhen



                                                                 was not used to



                                                                 interpret this



                                                                 result as



                                                                 normal/abnormal

.

 

             MPV (test code = 10.5 fL      9.8-13.0                  



             27310-3)                                            

 

             NRBC/100 WBC (test 0.0          See_Comment                [Automat

ed



             code = [id number])                                        message] 

The system



                                                                 which generated



                                                                 this result



                                                                 transmitted



                                                                 reference range

:



                                                                 0.0 - 10.0 /100



                                                                 WBCs. The refer

ence



                                                                 range was not u

sed



                                                                 to interpret th

is



                                                                 result as



                                                                 normal/abnormal

.

 

             NRBC x10^3 (test code              See_Comment                [Auto

mated



             = [id number])                                        message] The s

ystem



                                                                 which generated



                                                                 this result



                                                                 transmitted



                                                                 reference range

:



                                                                 10*3/?L. The



                                                                 reference range

 was



                                                                 not used to



                                                                 interpret this



                                                                 result as



                                                                 normal/abnormal

.

 

             GRAN MAT (NEUT) % 73.0 %                                 



             (test code = 770-8)                                        

 

             IMM GRAN % (test code 0.70 %                                 



             = 7196598858)                                        

 

             LYMPH % (test code = 19.6 %                                 



             736-9)                                              

 

             MONO % (test code = 6.1 %                                  



             5905-5)                                             

 

             EOS % (test code = 0.3 %                                  



             713-8)                                              

 

             BASO % (test code = 0.3 %                                  



             706-2)                                              

 

             GRAN MAT x10^3(ANC) 8.91 10*3/uL 1.99-6.95    H            



             (test code =                                        



             0891290824)                                         

 

             IMM GRAN x10^3 (test 0.09 10*3/uL 0.00-0.06    H            



             code = 3499330043)                                        

 

             LYMPH x10^3 (test code 2.39 10*3/uL 1.09-3.23                 



             = 731-0)                                            

 

             MONO x10^3 (test code 0.74 10*3/uL 0.36-1.02                 



             = 742-7)                                            

 

             EOS x10^3 (test code = 0.04 10*3/uL 0.06-0.53    L            



             711-2)                                              

 

             BASO x10^3 (test code 0.04 10*3/uL 0.01-0.09                 



             = 704-7)                                            

 

             Lab Interpretation Abnormal                               



             (test code = 93030-2)                                        



[facility] GLUCOSE (AUTOMATED)2023 02:39:28





             Test Item    Value        Reference Range Interpretation Comments

 

             POCT GLU (test code = 1690201692) 438 mg/dL           H      

      

 

             Lab Interpretation (test code = Abnormal                           

    



             37182-2)                                            



[id number]





             Test Item    Value        Reference Range Interpretation Comments

 

             NA (test code = 136 mmol/L   135-145                   



             8150998869)                                         

 

             K (test code = 3.8 mmol/L   3.5-5.0                   



             3103712855)                                         

 

             CL (test code = 104 mmol/L                       



             1504401545)                                         

 

             CO2 TOTAL (test code = 23 mmol/L    23-31                     



             1652092348)                                         

 

             AGAP (test code = 9            2-16                      



             0157082023)                                         

 

             BUN (test code = 6 mg/dL      7-23         L            



             2146732530)                                         

 

             GLUCOSE (test code = 645 mg/dL           HH           



             2191298011)                                         

 

             CREATININE (test code = 0.68 mg/dL   0.60-1.25                 



             7011685334)                                         

 

             TOTAL BILI (test code = 1.0 mg/dL    0.1-1.1                   



             4230266644)                                         

 

             CALCIUM (test code = 8.5 mg/dL    8.6-10.6     L            



             7662470994)                                         

 

             T PROTEIN (test code = 6.8 g/dL     6.3-8.2                   



             6635286523)                                         

 

             ALBUMIN (test code = 3.5 g/dL     3.5-5.0                   



             6289333775)                                         

 

             ALK PHOS (test code = 201 U/L             H            



             2262593247)                                         

 

             ALTv (test code = 60 U/L       5-50         H            



             1742-6)                                             

 

             AST(SGOT) (test code = 23 U/L       13-40                     



             2026737842)                                         

 

             eGFR (test code = 131.2        mL/min/1.73m2              



             3103575535)                                         

 

             MAL (test code = MAL) Association of                           



                          Glomerular Filtration                           



                          Rate (GFR) and Staging                           



                          of Kidney Disease*                           



                          +---------------------                           



                          --+-------------------                           



                          --+-------------------                           



                          ------+| GFR                           



                          (mL/min/1.73 m2) ?|                           



                          With Kidney Damage ?|                           



                          ?Without Kidney                           



                          Damage+---------------                           



                          --------+-------------                           



                          --------+-------------                           



                          ------------+| ?>90 ?                           



                          ? ? ? ? ? ? ? ?|                           



                          ?Stage one ? ? ? ? ?|                           



                          ? Normal ? ? ? ? ? ? ?                           



                          ?+--------------------                           



                          ---+------------------                           



                          ---+------------------                           



                          -------+| ?60-89 ? ? ?                           



                          ? ? ? ? ?| ?Stage two                           



                          ? ? ? ? ?| ? Decreased                           



                          GFR ? ? ? ?                            



                          +---------------------                           



                          --+-------------------                           



                          --+-------------------                           



                          ------+| ?30-59 ? ? ?                           



                          ? ? ? ? ?| ?Stage                           



                          three ? ? ? ?| ? Stage                           



                          three ? ? ? ? ?                           



                          +---------------------                           



                          --+-------------------                           



                          --+-------------------                           



                          ------+| ?15-29 ? ? ?                           



                          ? ? ? ? ?| ?Stage four                           



                          ? ? ? ? | ? Stage four                           



                          ? ? ? ? ?                              



                          ?+--------------------                           



                          ---+------------------                           



                          ---+------------------                           



                          -------+| ?<15 (or                           



                          dialysis) ? ?| ?Stage                           



                          five ? ? ? ? | ? Stage                           



                          five ? ? ? ? ?                           



                          ?+--------------------                           



                          ---+------------------                           



                          ---+------------------                           



                          -------+ *Each stage                           



                          assumes the associated                           



                          GFR level has been in                           



                          effect for at least                           



                          three months. ?Stages                           



                          1 to 5, with or                           



                          without kidney                           



                          disease, indicate                           



                          chronic kidney                           



                          disease. Notes:                           



                          Determination of                           



                          stages one and two                           



                          (with eGFR                             



                          >59mL/min/1.73 m2)                           



                          requires estimation of                           



                          kidney damage for at                           



                          least three months as                           



                          defined by structural                           



                          or functional                           



                          abnormalities of the                           



                          kidney, manifested by                           



                          either:Pathological                           



                          abnormalities or                           



                          Markers of kidney                           



                          damage (including                           



                          abnormalities in the                           



                          composition of the                           



                          blood or urine or                           



                          abnormalities in                           



                          imaging tests).                           

 

             Lab Interpretation Abnormal                               



             (test code = 10534-2)                                        



[facility]





             Test Item    Value        Reference Range Interpretation Comments

 

             LIPASE (test code = 4800727434) 141 U/L      0-220                 

    

 

             Lab Interpretation (test code = Normal                             

    



             79999-0)                                            



[facility] WITH [id number] 00:01:30





             Test Item    Value        Reference Range Interpretation Comments

 

             WBC (test code = 8.06         See_Comment                [Automated



             0063-2)                                             message] The sy

stem



                                                                 which generated



                                                                 this result



                                                                 transmitted



                                                                 reference range

:



                                                                 4.20 - 10.70



                                                                 10*3/?L. The



                                                                 reference range

 was



                                                                 not used to



                                                                 interpret this



                                                                 result as



                                                                 normal/abnormal

.

 

             RBC (test code = 5.04         See_Comment                [Automated



             359-8)                                              message] The sy

stem



                                                                 which generated



                                                                 this result



                                                                 transmitted



                                                                 reference range

:



                                                                 4.26 - 5.52



                                                                 10*6/?L. The



                                                                 reference range

 was



                                                                 not used to



                                                                 interpret this



                                                                 result as



                                                                 normal/abnormal

.

 

             HGB (test code = 14.4 g/dL    12.2-16.4                 



             718-7)                                              

 

             HCT (test code = 43.1 %       38.4-49.3                 



             4544-3)                                             

 

             MCV (test code = 85.5 fL      81.7-95.6                 



             787-2)                                              

 

             MCH (test code = 28.6 pg      26.1-32.7                 



             785-6)                                              

 

             MCHC (test code = 33.4 g/dL    31.2-35.0                 



             786-4)                                              

 

             RDW-SD (test code = 46.7 fL      38.5-51.6                 



             63734-0)                                            

 

             RDW-CV (test code = 15.5 %       12.1-15.4    H            



             788-0)                                              

 

             PLT (test code = 386          See_Comment  H             [Automated



             777-3)                                              message] The sy

stem



                                                                 which generated



                                                                 this result



                                                                 transmitted



                                                                 reference range

:



                                                                 150 - 328 10*3/

?L.



                                                                 The reference r

zhen



                                                                 was not used to



                                                                 interpret this



                                                                 result as



                                                                 normal/abnormal

.

 

             MPV (test code = 10.7 fL      9.8-13.0                  



             56364-8)                                            

 

             NRBC/100 WBC (test 0.0          See_Comment                [Automat

ed



             code = 4697470806)                                        message] 

The system



                                                                 which generated



                                                                 this result



                                                                 transmitted



                                                                 reference range

:



                                                                 0.0 - 10.0 /100



                                                                 WBCs. The refer

ence



                                                                 range was not u

sed



                                                                 to interpret th

is



                                                                 result as



                                                                 normal/abnormal

.

 

             NRBC x10^3 (test code              See_Comment                [Auto

mated



             = 7124751127)                                        message] The s

ystem



                                                                 which generated



                                                                 this result



                                                                 transmitted



                                                                 reference range

:



                                                                 10*3/?L. The



                                                                 reference range

 was



                                                                 not used to



                                                                 interpret this



                                                                 result as



                                                                 normal/abnormal

.

 

             GRAN MAT (NEUT) % 72.5 %                                 



             (test code = 770-8)                                        

 

             IMM GRAN % (test code 0.40 %                                 



             = [id number])                                        

 

             LYMPH % (test code = 15.0 %                                 



             736-9)                                              

 

             MONO % (test code = 9.1 %                                  



             5905-5)                                             

 

             EOS % (test code = 2.5 %                                  



             713-8)                                              

 

             BASO % (test code = 0.5 %                                  



             706-2)                                              

 

             GRAN MAT x10^3(ANC) 5.85 10*3/uL 1.99-6.95                 



             (test code =                                        



             6699173226)                                         

 

             IMM GRAN x10^3 (test 0.03 10*3/uL 0.00-0.06                 



             code = 9116823223)                                        

 

             LYMPH x10^3 (test code 1.21 10*3/uL 1.09-3.23                 



             = 731-0)                                            

 

             MONO x10^3 (test code 0.73 10*3/uL 0.36-1.02                 



             = 742-7)                                            

 

             EOS x10^3 (test code = 0.20 10*3/uL 0.06-0.53                 



             711-2)                                              

 

             BASO x10^3 (test code 0.04 10*3/uL 0.01-0.09                 



             = 704-7)                                            

 

             Lab Interpretation Abnormal                               



             (test code = 86336-5)                                        



[facility]





             Test Item    Value        Reference Range Interpretation Comments

 

             POCT GLU (test code = 0967732704) 367 mg/dL           H      

      

 

             Lab Interpretation (test code = Abnormal                           

    



             46655-9)                                            



[facility]. METABOLIC PANEL ([id number])2023 
22:58:25





             Test Item    Value        Reference Range Interpretation Comments

 

             NA (test code = 143 mmol/L   135-145                   



             0118566040)                                         

 

             K (test code = 4.2 mmol/L   3.5-5.0                   



             6527502927)                                         

 

             CL (test code = 106 mmol/L                       



             2341606453)                                         

 

             CO2 TOTAL (test code = 18 mmol/L    23-31        L            



             0100375323)                                         

 

             AGAP (test code = 19           2-16         H            



             3870079254)                                         

 

             BUN (test code = 7 mg/dL      7-23                      



             2834050665)                                         

 

             GLUCOSE (test code = 542 mg/dL           HH           



             9599372870)                                         

 

             CREATININE (test code = 0.67 mg/dL   0.60-1.25                 



             5094382189)                                         

 

             TOTAL BILI (test code = 1.5 mg/dL    0.1-1.1      H            



             8265911498)                                         

 

             CALCIUM (test code = 9.5 mg/dL    8.6-10.6                  



             4447012767)                                         

 

             T PROTEIN (test code = 7.9 g/dL     6.3-8.2                   



             3451429570)                                         

 

             ALBUMIN (test code = 4.2 g/dL     3.5-5.0                   



             8412563880)                                         

 

             ALK PHOS (test code = 233 U/L             H            



             5783083765)                                         

 

             ALTv (test code = 86 U/L       5-50         H            



             1742-6)                                             

 

             AST(SGOT) (test code = 25 U/L       13-40                     



             3742150323)                                         

 

             eGFR (test code = 133.5        mL/min/1.73m2              



             8045568226)                                         

 

             MAL (test code = MAL) Association of                           



                          Glomerular Filtration                           



                          Rate (GFR) and Staging                           



                          of Kidney Disease*                           



                          +---------------------                           



                          --+-------------------                           



                          --+-------------------                           



                          ------+| GFR                           



                          (mL/min/1.73 m2) ?|                           



                          With Kidney Damage ?|                           



                          ?Without Kidney                           



                          Damage+---------------                           



                          --------+-------------                           



                          --------+-------------                           



                          ------------+| ?>90 ?                           



                          ? ? ? ? ? ? ? ?|                           



                          ?Stage one ? ? ? ? ?|                           



                          ? Normal ? ? ? ? ? ? ?                           



                          ?+--------------------                           



                          ---+------------------                           



                          ---+------------------                           



                          -------+| ?60-89 ? ? ?                           



                          ? ? ? ? ?| ?Stage two                           



                          ? ? ? ? ?| ? Decreased                           



                          GFR ? ? ? ?                            



                          +---------------------                           



                          --+-------------------                           



                          --+-------------------                           



                          ------+| ?30-59 ? ? ?                           



                          ? ? ? ? ?| ?Stage                           



                          three ? ? ? ?| ? Stage                           



                          three ? ? ? ? ?                           



                          +---------------------                           



                          --+-------------------                           



                          --+-------------------                           



                          ------+| ?15-29 ? ? ?                           



                          ? ? ? ? ?| ?Stage four                           



                          ? ? ? ? | ? Stage four                           



                          ? ? ? ? ?                              



                          ?+--------------------                           



                          ---+------------------                           



                          ---+------------------                           



                          -------+| ?<15 (or                           



                          dialysis) ? ?| ?Stage                           



                          five ? ? ? ? | ? Stage                           



                          five ? ? ? ? ?                           



                          ?+--------------------                           



                          ---+------------------                           



                          ---+------------------                           



                          -------+ *Each stage                           



                          assumes the associated                           



                          GFR level has been in                           



                          effect for at least                           



                          three months. ?Stages                           



                          1 to 5, with or                           



                          without kidney                           



                          disease, indicate                           



                          chronic kidney                           



                          disease. Notes:                           



                          Determination of                           



                          stages one and two                           



                          (with eGFR                             



                          >59mL/min/1.73 m2)                           



                          requires estimation of                           



                          kidney damage for at                           



                          least three months as                           



                          defined by structural                           



                          or functional                           



                          abnormalities of the                           



                          kidney, manifested by                           



                          either:Pathological                           



                          abnormalities or                           



                          Markers of kidney                           



                          damage (including                           



                          abnormalities in the                           



                          composition of the                           



                          blood or urine or                           



                          abnormalities in                           



                          imaging tests).                           

 

             Lab Interpretation Abnormal                               



             (test code = 39531-7)                                        



[facility]





             Test Item    Value        Reference Range Interpretation Comments

 

             MAGNESIUM (test code = 9596562207) 1.7 mg/dL    1.7-2.4            

       

 

             Lab Interpretation (test code = Normal                             

    



             94095-4)                                            



[facility] WITH [id number] 22:28:16





             Test Item    Value        Reference Range Interpretation Comments

 

             WBC (test code = 8.36         See_Comment                [Automated



             1209-2)                                             message] The sy

stem



                                                                 which generated



                                                                 this result



                                                                 transmitted



                                                                 reference range

:



                                                                 4.20 - 10.70



                                                                 10*3/?L. The



                                                                 reference range

 was



                                                                 not used to



                                                                 interpret this



                                                                 result as



                                                                 normal/abnormal

.

 

             RBC (test code = 5.74         See_Comment  H             [Automated



             932-4)                                              message] The sy

stem



                                                                 which generated



                                                                 this result



                                                                 transmitted



                                                                 reference range

:



                                                                 4.26 - 5.52



                                                                 10*6/?L. The



                                                                 reference range

 was



                                                                 not used to



                                                                 interpret this



                                                                 result as



                                                                 normal/abnormal

.

 

             HGB (test code = 16.5 g/dL    12.2-16.4    H            



             718-7)                                              

 

             HCT (test code = 48.5 %       38.4-49.3                 



             4544-3)                                             

 

             MCV (test code = 84.5 fL      81.7-95.6                 



             787-2)                                              

 

             MCH (test code = 28.7 pg      26.1-32.7                 



             785-6)                                              

 

             MCHC (test code = 34.0 g/dL    31.2-35.0                 



             786-4)                                              

 

             RDW-SD (test code = 45.1 fL      38.5-51.6                 



             68749-0)                                            

 

             RDW-CV (test code = 15.8 %       12.1-15.4    H            



             788-0)                                              

 

             PLT (test code = 475          See_Comment  H             [Automated



             777-3)                                              message] The sy

stem



                                                                 which generated



                                                                 this result



                                                                 transmitted



                                                                 reference range

:



                                                                 150 - 328 10*3/

?L.



                                                                 The reference r

zhen



                                                                 was not used to



                                                                 interpret this



                                                                 result as



                                                                 normal/abnormal

.

 

             MPV (test code = 10.7 fL      9.8-13.0                  



             86911-3)                                            

 

             NRBC/100 WBC (test 0.0          See_Comment                [Automat

ed



             code = 0880114873)                                        message] 

The system



                                                                 which generated



                                                                 this result



                                                                 transmitted



                                                                 reference range

:



                                                                 0.0 - 10.0 /100



                                                                 WBCs. The refer

ence



                                                                 range was not u

sed



                                                                 to interpret th

is



                                                                 result as



                                                                 normal/abnormal

.

 

             NRBC x10^3 (test code              See_Comment                [Auto

mated



             = 3221471541)                                        message] The s

ystem



                                                                 which generated



                                                                 this result



                                                                 transmitted



                                                                 reference range

:



                                                                 10*3/?L. The



                                                                 reference range

 was



                                                                 not used to



                                                                 interpret this



                                                                 result as



                                                                 normal/abnormal

.

 

             GRAN MAT (NEUT) % 70.8 %                                 



             (test code = 770-8)                                        

 

             IMM GRAN % (test code 1.00 %                                 



             = 0938452654)                                        

 

             LYMPH % (test code = 18.3 %                                 



             736-9)                                              

 

             MONO % (test code = 7.7 %                                  



             5905-5)                                             

 

             EOS % (test code = 1.8 %                                  



             713-8)                                              

 

             BASO % (test code = 0.4 %                                  



             706-2)                                              

 

             GRAN MAT x10^3(ANC) 5.93 10*3/uL 1.99-6.95                 



             (test code =                                        



             2970717253)                                         

 

             IMM GRAN x10^3 (test 0.08 10*3/uL 0.00-0.06    H            



             code = 5130071110)                                        

 

             LYMPH x10^3 (test code 1.53 10*3/uL 1.09-3.23                 



             = 731-0)                                            

 

             MONO x10^3 (test code 0.64 10*3/uL 0.36-1.02                 



             = 742-7)                                            

 

             EOS x10^3 (test code = 0.15 10*3/uL 0.06-0.53                 



             711-2)                                              

 

             BASO x10^3 (test code 0.03 10*3/uL 0.01-0.09                 



             = 704-7)                                            

 

             Lab Interpretation Abnormal                               



             (test code = 80776-1)                                        



[facility] GLUCOSE (AUTOMATED)2023 22:22:13





             Test Item    Value        Reference Range Interpretation Comments

 

             POCT GLU (test code = 4096580012) 515 mg/dL           HH     

      

 

             Lab Interpretation (test code = Abnormal                           

    



             38899-3)                                            



[facility] GLUCOSE (AUTOMATED)2023 21:18:09





             Test Item    Value        Reference Range Interpretation Comments

 

             POCT GLU (test code = 6114160041) 557 mg/dL           HH     

      

 

             Lab Interpretation (test code = Abnormal                           

    



             18234-4)                                            



[facility] GLUCOSE (AUTOMATED)2023 23:58:14





             Test Item    Value        Reference Range Interpretation Comments

 

             POCT GLU (test code = 6627216367) 235 mg/dL           H      

      

 

             Lab Interpretation (test code = Abnormal                           

    



             77391-6)                                            



[facility] GLUCOSE (AUTOMATED)2023 17:36:06





             Test Item    Value        Reference Range Interpretation Comments

 

             POCT GLU (test code = 1705949988) 276 mg/dL           H      

      

 

             Lab Interpretation (test code = Abnormal                           

    



             92589-2)                                            



[facility] GLUCOSE (AUTOMATED)2023 14:01:35





             Test Item    Value        Reference Range Interpretation Comments

 

             POCT GLU (test code = 5709713583) 306 mg/dL           H      

      

 

             Lab Interpretation (test code = Abnormal                           

    



             38614-9)                                            



[facility] GLUCOSE (AUTOMATED)2023 14:01:35





             Test Item    Value        Reference Range Interpretation Comments

 

             POCT GLU (test code = 2439015282) 321 mg/dL           H      

      

 

             Lab Interpretation (test code = Abnormal                           

    



             73932-8)                                            



[facility] GLUCOSE (AUTOMATED)2023 02:22:30





             Test Item    Value        Reference Range Interpretation Comments

 

             POCT GLU (test code = 5573582109) 323 mg/dL           H      

      

 

             Lab Interpretation (test code = Abnormal                           

    



             26409-7)                                            



[facility] GLUCOSE (AUTOMATED)2023 01:14:46





             Test Item    Value        Reference Range Interpretation Comments

 

             POCT GLU (test code = 4453882081) 311 mg/dL           H      

      

 

             Lab Interpretation (test code = Abnormal                           

    



             17215-5)                                            



[facility] GLUCOSE (AUTOMATED)2023 23:47:24





             Test Item    Value        Reference Range Interpretation Comments

 

             POCT GLU (test code = 4582859439) 354 mg/dL           H      

      

 

             Lab Interpretation (test code = Abnormal                           

    



             35688-8)                                            



[facility] GLUCOSE (AUTOMATED)2023 22:23:59





             Test Item    Value        Reference Range Interpretation Comments

 

             POCT GLU (test code = 9437991423) 398 mg/dL           H      

      

 

             Lab Interpretation (test code = Abnormal                           

    



             24318-5)                                            



[facility] HEMOGLOBIN A1C [id number] 19:04:00





             Test Item    Value        Reference Range Interpretation Comments

 

             POCT HBA1C (test code = 4548-4) 6.8 %        4-6          A        

    

 

             Lab Interpretation (test code = Abnormal                           

    



             85904-6)                                            



[facility] HEMOGLOBIN A1C [id number] 19:04:00





             Test Item    Value        Reference Range Interpretation Comments

 

             POCT HBA1C (test code = 4548-4) 6.8 %        4-6          A        

    

 

             Lab Interpretation (test code = Abnormal                           

    



             07776-8)                                            



[facility] HEMOGLOBIN A1C [id number] 19:04:00





             Test Item    Value        Reference Range Interpretation Comments

 

             POCT HBA1C (test code = 4548-4) 6.8 %        4-6          A        

    

 

             Lab Interpretation (test code = Abnormal                           

    



             40953-7)                                            



[facility] GLUCOSE (AUTOMATED)2022 23:04:10





             Test Item    Value        Reference Range Interpretation Comments

 

             POCT GLU (test code = 8838836660) 142 mg/dL           H      

      

 

             Lab Interpretation (test code = Abnormal                           

    



             58602-5)                                            



[facility] GLUCOSE (AUTOMATED)2022 18:07:54





             Test Item    Value        Reference Range Interpretation Comments

 

             POCT GLU (test code = 9538216777) 198 mg/dL           H      

      

 

             Lab Interpretation (test code = Abnormal                           

    



             99458-2)                                            



[facility] GLUCOSE (AUTOMATED)2022 13:54:10





             Test Item    Value        Reference Range Interpretation Comments

 

             POCT GLU (test code = 7321400348) 141 mg/dL           H      

      

 

             Lab Interpretation (test code = Abnormal                           

    



             43166-9)                                            



[facility] GLUCOSE (AUTOMATED)2022 02:47:37





             Test Item    Value        Reference Range Interpretation Comments

 

             POCT GLU (test code = 3237630534) 150 mg/dL           H      

      

 

             Lab Interpretation (test code = Abnormal                           

    



             08890-3)                                            



[facility]. METABOLIC PANEL (36621)2022 
23:49:17





             Test Item    Value        Reference Range Interpretation Comments

 

             NA (test code = 139 mmol/L   135-145                   



             3716393334)                                         

 

             K (test code = 4.5 mmol/L   3.5-5.0                   



             3942530413)                                         

 

             CL (test code = 104 mmol/L                       



             5284052435)                                         

 

             CO2 TOTAL (test code = 25 mmol/L    23-31                     



             6690219085)                                         

 

             AGAP (test code =              2-16                      



             9363810051)                                         

 

             BUN (test code = 13 mg/dL     7-23                      



             2381142922)                                         

 

             GLUCOSE (test code = 228 mg/dL           H            



             5231974495)                                         

 

             CREATININE (test code = 0.56 mg/dL   0.60-1.25    L            



             2551472990)                                         

 

             TOTAL BILI (test code = 0.6 mg/dL    0.1-1.1                   



             4215609209)                                         

 

             CALCIUM (test code = 9.7 mg/dL    8.6-10.6                  



             9151851248)                                         

 

             T PROTEIN (test code = 7.8 g/dL     6.3-8.2                   



             6141037237)                                         

 

             ALBUMIN (test code = 4.4 g/dL     3.5-5.0                   



             4228984360)                                         

 

             ALK PHOS (test code = 132 U/L             H            



             1779503518)                                         

 

             ALTv (test code = 31 U/L       5-50                      



             1742-6)                                             

 

             AST(SGOT) (test code = 20 U/L       13-40                     



             7915805096)                                         

 

             eGFR (test code =              mL/min/1.73m2              



             2780859108)                                         

 

             MAL (test code = MAL) Association of                           



                          Glomerular Filtration                           



                          Rate (GFR) and Staging                           



                          of Kidney Disease*                           



                          +---------------------                           



                          --+-------------------                           



                          --+-------------------                           



                          ------+| GFR                           



                          (mL/min/1.73 m2) ?|                           



                          With Kidney Damage ?|                           



                          ?Without Kidney                           



                          Damage+---------------                           



                          --------+-------------                           



                          --------+-------------                           



                          ------------+| ?>90 ?                           



                          ? ? ? ? ? ? ? ?|                           



                          ?Stage one ? ? ? ? ?|                           



                          ? Normal ? ? ? ? ? ? ?                           



                          ?+--------------------                           



                          ---+------------------                           



                          ---+------------------                           



                          -------+| ?60-89 ? ? ?                           



                          ? ? ? ? ?| ?Stage two                           



                          ? ? ? ? ?| ? Decreased                           



                          GFR ? ? ? ?                            



                          +---------------------                           



                          --+-------------------                           



                          --+-------------------                           



                          ------+| ?30-59 ? ? ?                           



                          ? ? ? ? ?| ?Stage                           



                          three ? ? ? ?| ? Stage                           



                          three ? ? ? ? ?                           



                          +---------------------                           



                          --+-------------------                           



                          --+-------------------                           



                          ------+| ?15-29 ? ? ?                           



                          ? ? ? ? ?| ?Stage four                           



                          ? ? ? ? | ? Stage four                           



                          ? ? ? ? ?                              



                          ?+--------------------                           



                          ---+------------------                           



                          ---+------------------                           



                          -------+| ?<15 (or                           



                          dialysis) ? ?| ?Stage                           



                          five ? ? ? ? | ? Stage                           



                          five ? ? ? ? ?                           



                          ?+--------------------                           



                          ---+------------------                           



                          ---+------------------                           



                          -------+ *Each stage                           



                          assumes the associated                           



                          GFR level has been in                           



                          effect for at least                           



                          three months. ?Stages                           



                          1 to 5, with or                           



                          without kidney                           



                          disease, indicate                           



                          chronic kidney                           



                          disease. Notes:                           



                          Determination of                           



                          stages one and two                           



                          (with eGFR                             



                          >59mL/min/1.73 m2)                           



                          requires estimation of                           



                          kidney damage for at                           



                          least three months as                           



                          defined by structural                           



                          or functional                           



                          abnormalities of the                           



                          kidney, manifested by                           



                          either:Pathological                           



                          abnormalities or                           



                          Markers of kidney                           



                          damage (including                           



                          abnormalities in the                           



                          composition of the                           



                          blood or urine or                           



                          abnormalities in                           



                          imaging tests).                           

 

             Lab Interpretation Abnormal                               



             (test code = 33054-5)                                        



[facility] WITH [id number] 23:45:35





             Test Item    Value        Reference Range Interpretation Comments

 

             WBC (test code =              See_Comment                [Automated



             3929-2)                                             message] The sy

stem



                                                                 which generated



                                                                 this result



                                                                 transmitted



                                                                 reference range

:



                                                                 4.20 - 10.70



                                                                 10*3/?L. The



                                                                 reference range

 was



                                                                 not used to



                                                                 interpret this



                                                                 result as



                                                                 normal/abnormal

.

 

             RBC (test code =              See_Comment  H             [Automated



             256-2)                                              message] The sy

stem



                                                                 which generated



                                                                 this result



                                                                 transmitted



                                                                 reference range

:



                                                                 4.26 - 5.52



                                                                 10*6/?L. The



                                                                 reference range

 was



                                                                 not used to



                                                                 interpret this



                                                                 result as



                                                                 normal/abnormal

.

 

             HGB (test code = 16.4 g/dL    12.2-16.4                 



             718-7)                                              

 

             HCT (test code = 49.3 %       38.4-49.3                 



             4544-3)                                             

 

             MCV (test code = 83.6 fL      81.7-95.6                 



             787-2)                                              

 

             MCH (test code = 27.8 pg      26.1-32.7                 



             785-6)                                              

 

             MCHC (test code = 33.3 g/dL    31.2-35.0                 



             786-4)                                              

 

             RDW-SD (test code = 45.1 fL      38.5-51.6                 



             38317-2)                                            

 

             RDW-CV (test code = 14.9 %       12.1-15.4                 



             788-0)                                              

 

             PLT (test code =              See_Comment                [Automated



             777-3)                                              message] The sy

stem



                                                                 which generated



                                                                 this result



                                                                 transmitted



                                                                 reference range

:



                                                                 150 - 328 10*3/

?L.



                                                                 The reference r

zhen



                                                                 was not used to



                                                                 interpret this



                                                                 result as



                                                                 normal/abnormal

.

 

             MPV (test code = 10.9 fL      9.8-13.0                  



             33969-5)                                            

 

             IPF % (test code = 9.9 %        1.2-10.7                  Platelet 

count



             2587377076)                                         measured by



                                                                 fluorescence



                                                                 method.

 

             NRBC/100 WBC (test              See_Comment                [Automat

ed



             code = 5586728791)                                        message] 

The system



                                                                 which generated



                                                                 this result



                                                                 transmitted



                                                                 reference range

:



                                                                 0.0 - 10.0 /100



                                                                 WBCs. The refer

ence



                                                                 range was not u

sed



                                                                 to interpret th

is



                                                                 result as



                                                                 normal/abnormal

.

 

             NRBC x10^3 (test code              See_Comment                [Auto

mated



             = 5684642326)                                        message] The s

ystem



                                                                 which generated



                                                                 this result



                                                                 transmitted



                                                                 reference range

:



                                                                 10*3/?L. The



                                                                 reference range

 was



                                                                 not used to



                                                                 interpret this



                                                                 result as



                                                                 normal/abnormal

.

 

             GRAN MAT (NEUT) % 65.4 %                                 



             (test code = 770-8)                                        

 

             IMM GRAN % (test code 0.60 %                                 



             = 1884653675)                                        

 

             LYMPH % (test code = 23.1 %                                 



             736-9)                                              

 

             MONO % (test code = 7.9 %                                  



             5905-5)                                             

 

             EOS % (test code = 2.6 %                                  



             713-8)                                              

 

             BASO % (test code = 0.4 %                                  



             706-2)                                              

 

             GRAN MAT x10^3(ANC) 6.34 10*3/uL 1.99-6.95                 



             (test code =                                        



             8001995688)                                         

 

             IMM GRAN x10^3 (test 0.06 10*3/uL 0.00-0.06                 



             code = 5798756596)                                        

 

             LYMPH x10^3 (test code 2.24 10*3/uL 1.09-3.23                 



             = 731-0)                                            

 

             MONO x10^3 (test code 0.77 10*3/uL 0.36-1.02                 



             = 742-7)                                            

 

             EOS x10^3 (test code = 0.25 10*3/uL 0.06-0.53                 



             711-2)                                              

 

             BASO x10^3 (test code 0.04 10*3/uL 0.01-0.09                 



             = 704-7)                                            

 

             Lab Interpretation Abnormal                               



             (test code = 14806-1)                                        



[id number]





             Test Item    Value        Reference Range Interpretation Comments

 

             LACTIC ACID (test code = 3.00 mmol/L  0.50-2.20    H            



             6766551656)                                         

 

             Lab Interpretation (test code = Abnormal                           

    



             89998-4)                                            



[facility] WITH [id number] 01:28:46





             Test Item    Value        Reference Range Interpretation Comments

 

             WBC (test code =              See_Comment                [Automated



             6690-2)                                             message] The sy

stem



                                                                 which generated



                                                                 this result



                                                                 transmitted



                                                                 reference range

:



                                                                 4.20 - 10.70



                                                                 10*3/?L. The



                                                                 reference range

 was



                                                                 not used to



                                                                 interpret this



                                                                 result as



                                                                 normal/abnormal

.

 

             RBC (test code =              See_Comment  H             [Automated



             529-8)                                              message] The sy

stem



                                                                 which generated



                                                                 this result



                                                                 transmitted



                                                                 reference range

:



                                                                 4.26 - 5.52



                                                                 10*6/?L. The



                                                                 reference range

 was



                                                                 not used to



                                                                 interpret this



                                                                 result as



                                                                 normal/abnormal

.

 

             HGB (test code = 17.4 g/dL    12.2-16.4    H            



             718-7)                                              

 

             HCT (test code = 52.0 %       38.4-49.3    H            



             4544-3)                                             

 

             MCV (test code = 85.5 fL      81.7-95.6                 



             787-2)                                              

 

             MCH (test code = 28.6 pg      26.1-32.7                 



             785-6)                                              

 

             MCHC (test code = 33.5 g/dL    31.2-35.0                 



             786-4)                                              

 

             RDW-SD (test code = 47.6 fL      38.5-51.6                 



             09871-8)                                            

 

             RDW-CV (test code = 15.4 %       12.1-15.4                 



             788-0)                                              

 

             PLT (test code =              See_Comment  L             [Automated



             777-3)                                              message] The sy

stem



                                                                 which generated



                                                                 this result



                                                                 transmitted



                                                                 reference range

:



                                                                 150 - 328 10*3/

?L.



                                                                 The reference r

zhen



                                                                 was not used to



                                                                 interpret this



                                                                 result as



                                                                 normal/abnormal

.

 

             MPV (test code = 10.6 fL      9.8-13.0                  



             08786-6)                                            

 

             NRBC/100 WBC (test              See_Comment                [Automat

ed



             code = 9961715524)                                        message] 

The system



                                                                 which generated



                                                                 this result



                                                                 transmitted



                                                                 reference range

:



                                                                 0.0 - 10.0 /100



                                                                 WBCs. The refer

ence



                                                                 range was not u

sed



                                                                 to interpret th

is



                                                                 result as



                                                                 normal/abnormal

.

 

             NRBC x10^3 (test code              See_Comment                [Auto

mated



             = [id number])                                        message] The s

ystem



                                                                 which generated



                                                                 this result



                                                                 transmitted



                                                                 reference range

:



                                                                 10*3/?L. The



                                                                 reference range

 was



                                                                 not used to



                                                                 interpret this



                                                                 result as



                                                                 normal/abnormal

.

 

             GRAN MAT (NEUT) % 70.8 %                                 



             (test code = 770-8)                                        

 

             IMM GRAN % (test code 0.50 %                                 



             = 8764768407)                                        

 

             LYMPH % (test code = 20.4 %                                 



             736-9)                                              

 

             MONO % (test code = 5.9 %                                  



             5905-5)                                             

 

             EOS % (test code = 2.0 %                                  



             713-8)                                              

 

             BASO % (test code = 0.4 %                                  



             706-2)                                              

 

             GRAN MAT x10^3(ANC) 7.23 10*3/uL 1.99-6.95    H            



             (test code =                                        



             8976736997)                                         

 

             IMM GRAN x10^3 (test 0.05 10*3/uL 0.00-0.06                 



             code = 3525615446)                                        

 

             LYMPH x10^3 (test code 2.08 10*3/uL 1.09-3.23                 



             = 731-0)                                            

 

             MONO x10^3 (test code 0.60 10*3/uL 0.36-1.02                 



             = 742-7)                                            

 

             EOS x10^3 (test code = 0.20 10*3/uL 0.06-0.53                 



             711-2)                                              

 

             BASO x10^3 (test code 0.04 10*3/uL 0.01-0.09                 



             = 704-7)                                            

 

             Lab Interpretation Abnormal                               



             (test code = 63779-7)                                        



[facility]. METABOLIC PANEL ([id number])2022-10-30 
01:23:44





             Test Item    Value        Reference Range Interpretation Comments

 

             NA (test code = 141 mmol/L   135-145                   



             1527932807)                                         

 

             K (test code = 4.7 mmol/L   3.5-5.0                   



             2648131308)                                         

 

             CL (test code = 107 mmol/L                       



             8858347362)                                         

 

             CO2 TOTAL (test code = 23 mmol/L    23-31                     



             5561927445)                                         

 

             AGAP (test code =              2-16                      



             9987557248)                                         

 

             BUN (test code = 16 mg/dL     7-23                      



             8675980594)                                         

 

             GLUCOSE (test code = 144 mg/dL           H            



             1604295871)                                         

 

             CREATININE (test code = 0.54 mg/dL   0.60-1.25    L            



             5035122227)                                         

 

             TOTAL BILI (test code = 0.6 mg/dL    0.1-1.1                   



             1893529888)                                         

 

             CALCIUM (test code = 9.5 mg/dL    8.6-10.6                  



             2421631662)                                         

 

             T PROTEIN (test code = 7.7 g/dL     6.3-8.2                   



             6736280294)                                         

 

             ALBUMIN (test code = 4.4 g/dL     3.5-5.0                   



             9011762196)                                         

 

             ALK PHOS (test code = 101 U/L                          



             7510957659)                                         

 

             ALTv (test code = 28 U/L       5-50                      



             1742-6)                                             

 

             AST(SGOT) (test code = 20 U/L       13-40                     



             7105934238)                                         

 

             eGFR (test code =              mL/min/1.73m2              



             4439096963)                                         

 

             MAL (test code = MAL) Association of                           



                          Glomerular Filtration                           



                          Rate (GFR) and Staging                           



                          of Kidney Disease*                           



                          +---------------------                           



                          --+-------------------                           



                          --+-------------------                           



                          ------+| GFR                           



                          (mL/min/1.73 m2) ?|                           



                          With Kidney Damage ?|                           



                          ?Without Kidney                           



                          Damage+---------------                           



                          --------+-------------                           



                          --------+-------------                           



                          ------------+| ?>90 ?                           



                          ? ? ? ? ? ? ? ?|                           



                          ?Stage one ? ? ? ? ?|                           



                          ? Normal ? ? ? ? ? ? ?                           



                          ?+--------------------                           



                          ---+------------------                           



                          ---+------------------                           



                          -------+| ?60-89 ? ? ?                           



                          ? ? ? ? ?| ?Stage two                           



                          ? ? ? ? ?| ? Decreased                           



                          GFR ? ? ? ?                            



                          +---------------------                           



                          --+-------------------                           



                          --+-------------------                           



                          ------+| ?30-59 ? ? ?                           



                          ? ? ? ? ?| ?Stage                           



                          three ? ? ? ?| ? Stage                           



                          three ? ? ? ? ?                           



                          +---------------------                           



                          --+-------------------                           



                          --+-------------------                           



                          ------+| ?15-29 ? ? ?                           



                          ? ? ? ? ?| ?Stage four                           



                          ? ? ? ? | ? Stage four                           



                          ? ? ? ? ?                              



                          ?+--------------------                           



                          ---+------------------                           



                          ---+------------------                           



                          -------+| ?<15 (or                           



                          dialysis) ? ?| ?Stage                           



                          five ? ? ? ? | ? Stage                           



                          five ? ? ? ? ?                           



                          ?+--------------------                           



                          ---+------------------                           



                          ---+------------------                           



                          -------+ *Each stage                           



                          assumes the associated                           



                          GFR level has been in                           



                          effect for at least                           



                          three months. ?Stages                           



                          1 to 5, with or                           



                          without kidney                           



                          disease, indicate                           



                          chronic kidney                           



                          disease. Notes:                           



                          Determination of                           



                          stages one and two                           



                          (with eGFR                             



                          >59mL/min/1.73 m2)                           



                          requires estimation of                           



                          kidney damage for at                           



                          least three months as                           



                          defined by structural                           



                          or functional                           



                          abnormalities of the                           



                          kidney, manifested by                           



                          either:Pathological                           



                          abnormalities or                           



                          Markers of kidney                           



                          damage (including                           



                          abnormalities in the                           



                          composition of the                           



                          blood or urine or                           



                          abnormalities in                           



                          imaging tests).                           

 

             Lab Interpretation Abnormal                               



             (test code = 24564-0)                                        



[facility]





             Test Item    Value        Reference Range Interpretation Comments

 

             LIPASE (test code = 0717944800) 99 U/L       0-220                 

    

 

             Lab Interpretation (test code = Normal                             

    



             53930-6)                                            



[facility] GLUCOSE (AUTOMATED)2022-10-23 13:16:26





             Test Item    Value        Reference Range Interpretation Comments

 

             POCT GLU (test code = 8965805125) 162 mg/dL           H      

      

 

             Lab Interpretation (test code = Abnormal                           

    



             49637-9)                                            



[facility] GLUCOSE (AUTOMATED)2022-10-23 01:23:12





             Test Item    Value        Reference Range Interpretation Comments

 

             POCT GLU (test code = 3986191156) 248 mg/dL           H      

      

 

             Lab Interpretation (test code = Abnormal                           

    



             84789-9)                                            



[facility] GLUCOSE (AUTOMATED)2022-10-22 21:32:18





             Test Item    Value        Reference Range Interpretation Comments

 

             POCT GLU (test code = 2150678871) 239 mg/dL           H      

      

 

             Lab Interpretation (test code = Abnormal                           

    



             96239-7)                                            



[facility] GLUCOSE (AUTOMATED)2022-10-22 01:49:48





             Test Item    Value        Reference Range Interpretation Comments

 

             POCT GLU (test code = 2477990453) 317 mg/dL           H      

      

 

             Lab Interpretation (test code = Abnormal                           

    



             44609-3)                                            



[facility] GLUCOSE (AUTOMATED)2022-10-21 21:38:03





             Test Item    Value        Reference Range Interpretation Comments

 

             POCT GLU (test code = 3332236702) 139 mg/dL           H      

      

 

             Lab Interpretation (test code = Abnormal                           

    



             51088-3)                                            



[facility] GLUCOSE (AUTOMATED)2022-10-21 16:14:31





             Test Item    Value        Reference Range Interpretation Comments

 

             POCT GLU (test code = 0287689854) 164 mg/dL           H      

      

 

             Lab Interpretation (test code = Abnormal                           

    



             47777-7)                                            



[facility] GLUCOSE (AUTOMATED)2022-10-21 12:46:40





             Test Item    Value        Reference Range Interpretation Comments

 

             POCT GLU (test code = 6525002009) 154 mg/dL           H      

      

 

             Lab Interpretation (test code = Abnormal                           

    



             22535-0)                                            



[facility]





             Test Item    Value        Reference Range Interpretation Comments

 

             LIPASE (test code = 1237806798) 216 U/L      0-220                 

    

 

             Lab Interpretation (test code = Normal                             

    



             72947-8)                                            



[facility]. METABOLIC PANEL ([id number])2022-10-21 
06:39:52





             Test Item    Value        Reference Range Interpretation Comments

 

             NA (test code = 139 mmol/L   135-145                   



             1457251097)                                         

 

             K (test code = 4.7 mmol/L   3.5-5                     



             9179186890)                                         

 

             CL (test code = 106 mmol/L                       



             8502325106)                                         

 

             CO2 TOTAL (test code = 20 mmol/L    23-31        L            



             8068918441)                                         

 

             AGAP (test code =              2-16                      



             1510339518)                                         

 

             BUN (test code = 16 mg/dL     7-23                      



             7779964437)                                         

 

             GLUCOSE (test code = 224 mg/dL           H            



             6251708072)                                         

 

             CREATININE (test code = 0.72 mg/dL   0.6-1.25                  



             7812973927)                                         

 

             TOTAL BILI (test code = 0.4 mg/dL    0.1-1.1                   



             2155275076)                                         

 

             CALCIUM (test code = 9.3 mg/dL    8.6-10.6                  



             7927653988)                                         

 

             T PROTEIN (test code = 7.2 g/dL     6.3-8.2                   



             9952964423)                                         

 

             ALBUMIN (test code = 4.1 g/dL     3.5-5                     



             7189127124)                                         

 

             ALK PHOS (test code = 118 U/L                          



             4195010573)                                         

 

             ALTv (test code = 46 U/L       5-50                      



             1742-6)                                             

 

             AST(SGOT) (test code = 18 U/L       13-40                     



             3714439387)                                         

 

             eGFR (test code =              mL/min/1.73m2              



             5435478331)                                         

 

             MAL (test code = MAL) Association of                           



                          Glomerular Filtration                           



                          Rate (GFR) and Staging                           



                          of Kidney Disease*                           



                          +---------------------                           



                          --+-------------------                           



                          --+-------------------                           



                          ------+| GFR                           



                          (mL/min/1.73 m2) ?|                           



                          With Kidney Damage ?|                           



                          ?Without Kidney                           



                          Damage+---------------                           



                          --------+-------------                           



                          --------+-------------                           



                          ------------+| ?>90 ?                           



                          ? ? ? ? ? ? ? ?|                           



                          ?Stage one ? ? ? ? ?|                           



                          ? Normal ? ? ? ? ? ? ?                           



                          ?+--------------------                           



                          ---+------------------                           



                          ---+------------------                           



                          -------+| ?60-89 ? ? ?                           



                          ? ? ? ? ?| ?Stage two                           



                          ? ? ? ? ?| ? Decreased                           



                          GFR ? ? ? ?                            



                          +---------------------                           



                          --+-------------------                           



                          --+-------------------                           



                          ------+| ?30-59 ? ? ?                           



                          ? ? ? ? ?| ?Stage                           



                          three ? ? ? ?| ? Stage                           



                          three ? ? ? ? ?                           



                          +---------------------                           



                          --+-------------------                           



                          --+-------------------                           



                          ------+| ?15-29 ? ? ?                           



                          ? ? ? ? ?| ?Stage four                           



                          ? ? ? ? | ? Stage four                           



                          ? ? ? ? ?                              



                          ?+--------------------                           



                          ---+------------------                           



                          ---+------------------                           



                          -------+| ?<15 (or                           



                          dialysis) ? ?| ?Stage                           



                          five ? ? ? ? | ? Stage                           



                          five ? ? ? ? ?                           



                          ?+--------------------                           



                          ---+------------------                           



                          ---+------------------                           



                          -------+ *Each stage                           



                          assumes the associated                           



                          GFR level has been in                           



                          effect for at least                           



                          three months. ?Stages                           



                          1 to 5, with or                           



                          without kidney                           



                          disease, indicate                           



                          chronic kidney                           



                          disease. Notes:                           



                          Determination of                           



                          stages one and two                           



                          (with eGFR                             



                          >59mL/min/1.73 m2)                           



                          requires estimation of                           



                          kidney damage for at                           



                          least three months as                           



                          defined by structural                           



                          or functional                           



                          abnormalities of the                           



                          kidney, manifested by                           



                          either:Pathological                           



                          abnormalities or                           



                          Markers of kidney                           



                          damage (including                           



                          abnormalities in the                           



                          composition of the                           



                          blood or urine or                           



                          abnormalities in                           



                          imaging tests).                           

 

             Lab Interpretation Abnormal                               



             (test code = 13361-5)                                        



[facility] WITH DIFF2022-10-21 06:28:46





             Test Item    Value        Reference Range Interpretation Comments

 

             WBC (test code =              See_Comment                [Automated



             8966-2)                                             message] The sy

stem



                                                                 which generated



                                                                 this result



                                                                 transmitted



                                                                 reference range

:



                                                                 4.20 - 10.70



                                                                 10*3/?L. The



                                                                 reference range

 was



                                                                 not used to



                                                                 interpret this



                                                                 result as



                                                                 normal/abnormal

.

 

             RBC (test code =              See_Comment                [Automated



             666-8)                                              message] The sy

stem



                                                                 which generated



                                                                 this result



                                                                 transmitted



                                                                 reference range

:



                                                                 4.26 - 5.52



                                                                 10*6/?L. The



                                                                 reference range

 was



                                                                 not used to



                                                                 interpret this



                                                                 result as



                                                                 normal/abnormal

.

 

             HGB (test code = 15.6 g/dL    12.2-16.4                 



             718-7)                                              

 

             HCT (test code = 46.3 %       38.4-49.3                 



             4544-3)                                             

 

             MCV (test code = 84.6 fL      81.7-95.6                 



             787-2)                                              

 

             MCH (test code = 28.5 pg      26.1-32.7                 



             785-6)                                              

 

             MCHC (test code = 33.7 g/dL    31.2-35                   



             786-4)                                              

 

             RDW-SD (test code = 45.7 fL      38.5-51.6                 



             81308-7)                                            

 

             RDW-CV (test code = 14.8 %       12.1-15.4                 



             788-0)                                              

 

             PLT (test code =              See_Comment  H             [Automated



             777-3)                                              message] The sy

stem



                                                                 which generated



                                                                 this result



                                                                 transmitted



                                                                 reference range

:



                                                                 150 - 328 10*3/

?L.



                                                                 The reference r

zhen



                                                                 was not used to



                                                                 interpret this



                                                                 result as



                                                                 normal/abnormal

.

 

             MPV (test code = 11.0 fL      9.8-13                    



             98250-0)                                            

 

             NRBC/100 WBC (test              See_Comment                [Automat

ed



             code = 3620064890)                                        message] 

The system



                                                                 which generated



                                                                 this result



                                                                 transmitted



                                                                 reference range

:



                                                                 0.0 - 10.0 /100



                                                                 WBCs. The refer

ence



                                                                 range was not u

sed



                                                                 to interpret th

is



                                                                 result as



                                                                 normal/abnormal

.

 

             NRBC x10^3 (test code              See_Comment                [Auto

mated



             = 4979576461)                                        message] The s

ystem



                                                                 which generated



                                                                 this result



                                                                 transmitted



                                                                 reference range

:



                                                                 10*3/?L. The



                                                                 reference range

 was



                                                                 not used to



                                                                 interpret this



                                                                 result as



                                                                 normal/abnormal

.

 

             GRAN MAT (NEUT) % 56.7 %                                 



             (test code = 770-8)                                        

 

             IMM GRAN % (test code 0.70 %                                 



             = 1658004372)                                        

 

             LYMPH % (test code = 31.5 %                                 



             736-9)                                              

 

             MONO % (test code = 8.6 %                                  



             5905-5)                                             

 

             EOS % (test code = 2.0 %                                  



             713-8)                                              

 

             BASO % (test code = 0.5 %                                  



             706-2)                                              

 

             GRAN MAT x10^3(ANC) 4.84 10*3/uL 1.99-6.95                 



             (test code =                                        



             9795266030)                                         

 

             IMM GRAN x10^3 (test 0.06 10*3/uL 0-0.06                    



             code = 3043652899)                                        

 

             LYMPH x10^3 (test code 2.69 10*3/uL 1.09-3.23                 



             = 731-0)                                            

 

             MONO x10^3 (test code 0.73 10*3/uL 0.36-1.02                 



             = 742-7)                                            

 

             EOS x10^3 (test code = 0.17 10*3/uL 0.06-0.53                 



             711-2)                                              

 

             BASO x10^3 (test code 0.04 10*3/uL 0.01-0.09                 



             = 704-7)                                            

 

             Lab Interpretation Abnormal                               



             (test code = 50671-4)                                        



[facility] CULTURE(AEROBIC/ANAEROBIC)2022 
20:34:19





             Test Item    Value        Reference Range Interpretation Comments

 

             TISSUE CULTURE (test No aerobic/anaerobic                          

 



             code = 20474-3) organisms isolated                           

 

             Gram stain (test code No PMNs or Mononuclear                       

    



             = 664-3)     cells observed                           



[facility] GLUCOSE (AUTOMATED)2022 18:36:41





             Test Item    Value        Reference Range Interpretation Comments

 

             POCT GLU (test code = 8522287576) 162 mg/dL           H      

      

 

             Lab Interpretation (test code = Abnormal                           

    



             23769-7)                                            



[facility] GLUCOSE (AUTOMATED)2022 14:48:29





             Test Item    Value        Reference Range Interpretation Comments

 

             POCT GLU (test code = 7654215293) 191 mg/dL           H      

      

 

             Lab Interpretation (test code = Abnormal                           

    



             00235-3)                                            



[facility] GLUCOSE (AUTOMATED)2022 10:56:47





             Test Item    Value        Reference Range Interpretation Comments

 

             POCT GLU (test code = 5751605251) 242 mg/dL           H      

      

 

             Lab Interpretation (test code = Abnormal                           

    



             73396-3)                                            



[facility] GLUCOSE (AUTOMATED)2022 05:47:30





             Test Item    Value        Reference Range Interpretation Comments

 

             POCT GLU (test code = 9884097413) 327 mg/dL           H      

      

 

             Lab Interpretation (test code = Abnormal                           

    



             14000-8)                                            



[facility] GLUCOSE (AUTOMATED)2022 02:18:34





             Test Item    Value        Reference Range Interpretation Comments

 

             POCT GLU (test code = 7241152022) 309 mg/dL           H      

      

 

             Lab Interpretation (test code = Abnormal                           

    



             73520-4)                                            



[facility] GLUCOSE (AUTOMATED)2022 23:17:38





             Test Item    Value        Reference Range Interpretation Comments

 

             POCT GLU (test code = 2551836281) 260 mg/dL           H      

      

 

             Lab Interpretation (test code = Abnormal                           

    



             72169-7)                                            



[facility] GLUCOSE (AUTOMATED)2022 18:33:41





             Test Item    Value        Reference Range Interpretation Comments

 

             POCT GLU (test code = 7177476106) 264 mg/dL           H      

      

 

             Lab Interpretation (test code = Abnormal                           

    



             33153-9)                                            



[facility] GLUCOSE (AUTOMATED)2022 15:02:50





             Test Item    Value        Reference Range Interpretation Comments

 

             POCT GLU (test code = 5402673697) 219 mg/dL           H      

      

 

             Lab Interpretation (test code = Abnormal                           

    



             25168-3)                                            



[facility]NA, K, CL, CO2, 
GLUCOSE, BUN, CREATININE, CA)2022 10:44:27





             Test Item    Value        Reference Range Interpretation Comments

 

             NA (test code = 132 mmol/L   135-145      L            



             4844324449)                                         

 

             K (test code = 4.4 mmol/L   3.5-5                     



             6033130808)                                         

 

             CL (test code = 103 mmol/L                       



             2117196155)                                         

 

             CO2 TOTAL (test code = 25 mmol/L    23-31                     



             8935290080)                                         

 

             AGAP (test code =              2-16                      



             6682927414)                                         

 

             BUN (test code = 15 mg/dL     7-23                      



             3985829535)                                         

 

             GLUCOSE (test code = 262 mg/dL           H            



             9831866862)                                         

 

             CREATININE (test code = 0.52 mg/dL   0.6-1.25     L            



             9727401438)                                         

 

             CALCIUM (test code = 8.3 mg/dL    8.6-10.6     L            



             2157003730)                                         

 

             eGFR (test code =              mL/min/1.73m2              



             2124878385)                                         

 

             MAL (test code = MAL) Association of                           



                          Glomerular Filtration                           



                          Rate (GFR) and Staging                           



                          of Kidney Disease*                           



                          +---------------------                           



                          --+-------------------                           



                          --+-------------------                           



                          ------+| GFR                           



                          (mL/min/1.73 m2) ?|                           



                          With Kidney Damage ?|                           



                          ?Without Kidney                           



                          Damage+---------------                           



                          --------+-------------                           



                          --------+-------------                           



                          ------------+| ?>90 ?                           



                          ? ? ? ? ? ? ? ?|                           



                          ?Stage one ? ? ? ? ?|                           



                          ? Normal ? ? ? ? ? ? ?                           



                          ?+--------------------                           



                          ---+------------------                           



                          ---+------------------                           



                          -------+| ?60-89 ? ? ?                           



                          ? ? ? ? ?| ?Stage two                           



                          ? ? ? ? ?| ? Decreased                           



                          GFR ? ? ? ?                            



                          +---------------------                           



                          --+-------------------                           



                          --+-------------------                           



                          ------+| ?30-59 ? ? ?                           



                          ? ? ? ? ?| ?Stage                           



                          three ? ? ? ?| ? Stage                           



                          three ? ? ? ? ?                           



                          +---------------------                           



                          --+-------------------                           



                          --+-------------------                           



                          ------+| ?15-29 ? ? ?                           



                          ? ? ? ? ?| ?Stage four                           



                          ? ? ? ? | ? Stage four                           



                          ? ? ? ? ?                              



                          ?+--------------------                           



                          ---+------------------                           



                          ---+------------------                           



                          -------+| ?<15 (or                           



                          dialysis) ? ?| ?Stage                           



                          five ? ? ? ? | ? Stage                           



                          five ? ? ? ? ?                           



                          ?+--------------------                           



                          ---+------------------                           



                          ---+------------------                           



                          -------+ *Each stage                           



                          assumes the associated                           



                          GFR level has been in                           



                          effect for at least                           



                          three months. ?Stages                           



                          1 to 5, with or                           



                          without kidney                           



                          disease, indicate                           



                          chronic kidney                           



                          disease. Notes:                           



                          Determination of                           



                          stages one and two                           



                          (with eGFR                             



                          >59mL/min/1.73 m2)                           



                          requires estimation of                           



                          kidney damage for at                           



                          least three months as                           



                          defined by structural                           



                          or functional                           



                          abnormalities of the                           



                          kidney, manifested by                           



                          either:Pathological                           



                          abnormalities or                           



                          Markers of kidney                           



                          damage (including                           



                          abnormalities in the                           



                          composition of the                           



                          blood or urine or                           



                          abnormalities in                           



                          imaging tests).                           

 

             Lab Interpretation Abnormal                               



             (test code = 97619-7)                                        



[facility] WITH [id number] 10:22:05





             Test Item    Value        Reference Range Interpretation Comments

 

             WBC (test code =              See_Comment                [Automated



             5490-2)                                             message] The sy

stem



                                                                 which generated



                                                                 this result



                                                                 transmitted



                                                                 reference range

:



                                                                 4.20 - 10.70



                                                                 10*3/?L. The



                                                                 reference range

 was



                                                                 not used to



                                                                 interpret this



                                                                 result as



                                                                 normal/abnormal

.

 

             RBC (test code =              See_Comment  L             [Automated



             839-8)                                              message] The sy

stem



                                                                 which generated



                                                                 this result



                                                                 transmitted



                                                                 reference range

:



                                                                 4.26 - 5.52



                                                                 10*6/?L. The



                                                                 reference range

 was



                                                                 not used to



                                                                 interpret this



                                                                 result as



                                                                 normal/abnormal

.

 

             HGB (test code = 10.5 g/dL    12.2-16.4    L            



             718-7)                                              

 

             HCT (test code = 31.2 %       38.4-49.3    L            



             4544-3)                                             

 

             MCV (test code = 88.9 fL      81.7-95.6                 



             787-2)                                              

 

             MCH (test code = 29.9 pg      26.1-32.7                 



             785-6)                                              

 

             MCHC (test code = 33.7 g/dL    31.2-35                   



             786-4)                                              

 

             RDW-SD (test code = 49.8 fL      38.5-51.6                 



             84579-9)                                            

 

             RDW-CV (test code = 15.9 %       12.1-15.4    H            



             788-0)                                              

 

             PLT (test code =              See_Comment  H             [Automated



             777-3)                                              message] The sy

stem



                                                                 which generated



                                                                 this result



                                                                 transmitted



                                                                 reference range

:



                                                                 150 - 328 10*3/

?L.



                                                                 The reference r

zhen



                                                                 was not used to



                                                                 interpret this



                                                                 result as



                                                                 normal/abnormal

.

 

             MPV (test code = 10.4 fL      9.8-13                    



             07343-2)                                            

 

             NRBC/100 WBC (test              See_Comment                [Automat

ed



             code = 7387795327)                                        message] 

The system



                                                                 which generated



                                                                 this result



                                                                 transmitted



                                                                 reference range

:



                                                                 0.0 - 10.0 /100



                                                                 WBCs. The refer

ence



                                                                 range was not u

sed



                                                                 to interpret th

is



                                                                 result as



                                                                 normal/abnormal

.

 

             NRBC x10^3 (test code              See_Comment                [Auto

mated



             = 9667689251)                                        message] The s

ystem



                                                                 which generated



                                                                 this result



                                                                 transmitted



                                                                 reference range

:



                                                                 10*3/?L. The



                                                                 reference range

 was



                                                                 not used to



                                                                 interpret this



                                                                 result as



                                                                 normal/abnormal

.

 

             GRAN MAT (NEUT) % 59.8 %                                 



             (test code = 770-8)                                        

 

             IMM GRAN % (test code 1.20 %                                 



             = 2333967694)                                        

 

             LYMPH % (test code = 28.2 %                                 



             736-9)                                              

 

             MONO % (test code = 8.4 %                                  



             5905-5)                                             

 

             EOS % (test code = 2.2 %                                  



             713-8)                                              

 

             BASO % (test code = 0.2 %                                  



             706-2)                                              

 

             GRAN MAT x10^3(ANC) 5.34 10*3/uL 1.99-6.95                 



             (test code =                                        



             2897406776)                                         

 

             IMM GRAN x10^3 (test 0.11 10*3/uL 0-0.06       H            



             code = 9625546683)                                        

 

             LYMPH x10^3 (test code 2.52 10*3/uL 1.09-3.23                 



             = 731-0)                                            

 

             MONO x10^3 (test code 0.75 10*3/uL 0.36-1.02                 



             = 742-7)                                            

 

             EOS x10^3 (test code = 0.20 10*3/uL 0.06-0.53                 



             711-2)                                              

 

             BASO x10^3 (test code              0.01-0.09                 



             = 704-7)                                            

 

             Lab Interpretation Abnormal                               



             (test code = 09373-0)                                        



[facility] GLUCOSE (AUTOMATED)2022 02:20:10





             Test Item    Value        Reference Range Interpretation Comments

 

             POCT GLU (test code = 8904008854) 281 mg/dL           H      

      

 

             Lab Interpretation (test code = Abnormal                           

    



             30848-9)                                            



[facility] GLUCOSE (AUTOMATED)2022-08-10 22:27:37





             Test Item    Value        Reference Range Interpretation Comments

 

             POCT GLU (test code = 4921751477) 187 mg/dL           H      

      

 

             Lab Interpretation (test code = Abnormal                           

    



             26792-7)                                            



[facility] GLUCOSE (AUTOMATED)2022-08-10 19:00:16





             Test Item    Value        Reference Range Interpretation Comments

 

             POCT GLU (test code = 0922830953) 167 mg/dL           H      

      

 

             Lab Interpretation (test code = Abnormal                           

    



             96837-9)                                            



[facility] GLUCOSE (AUTOMATED)2022-08-10 19:00:16





             Test Item    Value        Reference Range Interpretation Comments

 

             POCT GLU (test code = 0431279530) 167 mg/dL           H      

      

 

             Lab Interpretation (test code = Abnormal                           

    



             96317-7)                                            



[facility] GLUCOSE (AUTOMATED)2022-08-10 15:22:04





             Test Item    Value        Reference Range Interpretation Comments

 

             POCT GLU (test code = 5501464968) 138 mg/dL           H      

      

 

             Lab Interpretation (test code = Abnormal                           

    



             69835-1)                                            



[facility] GLUCOSE (AUTOMATED)2022-08-10 15:22:04





             Test Item    Value        Reference Range Interpretation Comments

 

             POCT GLU (test code = 2569080591) 138 mg/dL           H      

      

 

             Lab Interpretation (test code = Abnormal                           

    



             10116-5)                                            



[facility] GLUCOSE (AUTOMATED)2022-08-10 02:45:31





             Test Item    Value        Reference Range Interpretation Comments

 

             POCT GLU (test code = 5570753116) 142 mg/dL           H      

      

 

             Lab Interpretation (test code = Abnormal                           

    



             92382-3)                                            



[facility] GLUCOSE (AUTOMATED)2022-08-10 02:45:31





             Test Item    Value        Reference Range Interpretation Comments

 

             POCT GLU (test code = 7306846745) 142 mg/dL           H      

      

 

             Lab Interpretation (test code = Abnormal                           

    



             59840-4)                                            



[facility] GLUCOSE (AUTOMATED)2022 22:04:41





             Test Item    Value        Reference Range Interpretation Comments

 

             POCT GLU (test code = 2155203840) 260 mg/dL           H      

      

 

             Lab Interpretation (test code = Abnormal                           

    



             44922-8)                                            



[facility] GLUCOSE (AUTOMATED)2022 22:04:41





             Test Item    Value        Reference Range Interpretation Comments

 

             POCT GLU (test code = 7440863658) 260 mg/dL           H      

      

 

             Lab Interpretation (test code = Abnormal                           

    



             32951-6)                                            



[facility] Culture - Peripheral Vein #  
21:01:35





             Test Item    Value        Reference Range Interpretation Comments

 

             Blood Culture-Aerobic No organisms No growth                 Previo

us



             (test code = 17928-3) isolated                               prelim

inary



                                                                 verified result



                                                                 was Culture In



                                                                 Progress on



                                                                 2022 at 190

1



                                                                 CDTPrevious



                                                                 preliminary



                                                                 verified result



                                                                 was No growth a

t



                                                                 24 hours on



                                                                 2022 at 160

1



                                                                 CDTPrevious



                                                                 preliminary



                                                                 verified result



                                                                 was No growth a

t



                                                                 48 hours on



                                                                 2022 at 160

1



                                                                 CDTPrevious



                                                                 preliminary



                                                                 verified result



                                                                 was No growth a

t



                                                                 72 hours on



                                                                 2022 at 160

1



                                                                 CDT

 

             Blood        No organisms No growth                 Previous



             Culture-Anaerobic isolated                               preliminar

y



             (test code = 73970-4)                                        verifi

ed result



                                                                 was Culture In



                                                                 Progress on



                                                                 2022 at 190

1



                                                                 CDTPrevious



                                                                 preliminary



                                                                 verified result



                                                                 was No growth a

t



                                                                 24 hours on



                                                                 2022 at 160

1



                                                                 CDTPrevious



                                                                 preliminary



                                                                 verified result



                                                                 was No growth a

t



                                                                 48 hours on



                                                                 2022 at 160

1



                                                                 CDTPrevious



                                                                 preliminary



                                                                 verified result



                                                                 was No growth a

t



                                                                 72 hours on



                                                                 2022 at 160

1



                                                                 CDT

 

             Lab Interpretation Normal                                 



             (test code = 56026-0)                                        



[facility] Culture - Peripheral [id number] 
21:01:35





             Test Item    Value        Reference Range Interpretation Comments

 

             Blood Culture-Aerobic No organisms No growth                 Previo

us



             (test code = 17928-3) isolated                               prelim

inary



                                                                 verified result



                                                                 was Culture In



                                                                 Progress on



                                                                 2022 at 190

1



                                                                 CDTPrevious



                                                                 preliminary



                                                                 verified result



                                                                 was No growth a

t



                                                                 24 hours on



                                                                 2022 at 160

1



                                                                 CDTPrevious



                                                                 preliminary



                                                                 verified result



                                                                 was No growth a

t



                                                                 48 hours on



                                                                 2022 at 160

1



                                                                 CDTPrevious



                                                                 preliminary



                                                                 verified result



                                                                 was No growth a

t



                                                                 72 hours on



                                                                 2022 at 160

1



                                                                 CDT

 

             Blood        No organisms No growth                 Previous



             Culture-Anaerobic isolated                               preliminar

y



             (test code = 24239-6)                                        verifi

ed result



                                                                 was Culture In



                                                                 Progress on



                                                                 2022 at 190

1



                                                                 CDTPrevious



                                                                 preliminary



                                                                 verified result



                                                                 was No growth a

t



                                                                 24 hours on



                                                                 2022 at 160

1



                                                                 CDTPrevious



                                                                 preliminary



                                                                 verified result



                                                                 was No growth a

t



                                                                 48 hours on



                                                                 2022 at 160

1



                                                                 CDTPrevious



                                                                 preliminary



                                                                 verified result



                                                                 was No growth a

t



                                                                 72 hours on



                                                                 2022 at 160

1



                                                                 CDT

 

             Lab Interpretation Normal                                 



             (test code = 99185-6)                                        



[facility] Culture - Peripheral Vein #  
21:01:35





             Test Item    Value        Reference Range Interpretation Comments

 

             Blood Culture-Aerobic No organisms No growth                 Previo

us



             (test code = 17928-3) isolated                               prelim

inary



                                                                 verified result



                                                                 was Culture In



                                                                 Progress on



                                                                 2022 at 190

1



                                                                 CDTPrevious



                                                                 preliminary



                                                                 verified result



                                                                 was No growth a

t



                                                                 24 hours on



                                                                 2022 at 160

1



                                                                 CDTPrevious



                                                                 preliminary



                                                                 verified result



                                                                 was No growth a

t



                                                                 48 hours on



                                                                 2022 at 160

1



                                                                 CDTPrevious



                                                                 preliminary



                                                                 verified result



                                                                 was No growth a

t



                                                                 72 hours on



                                                                 2022 at 160

1



                                                                 CDT

 

             Blood        No organisms No growth                 Previous



             Culture-Anaerobic isolated                               preliminar

y



             (test code = 96135-0)                                        verifi

ed result



                                                                 was Culture In



                                                                 Progress on



                                                                 2022 at 190

1



                                                                 CDTPrevious



                                                                 preliminary



                                                                 verified result



                                                                 was No growth a

t



                                                                 24 hours on



                                                                 2022 at 160

1



                                                                 CDTPrevious



                                                                 preliminary



                                                                 verified result



                                                                 was No growth a

t



                                                                 48 hours on



                                                                 2022 at 160

1



                                                                 CDTPrevious



                                                                 preliminary



                                                                 verified result



                                                                 was No growth a

t



                                                                 72 hours on



                                                                 2022 at 160

1



                                                                 CDT

 

             Lab Interpretation Normal                                 



             (test code = 21582-2)                                        



[facility] Culture - Peripheral [id number] 
21:01:35





             Test Item    Value        Reference Range Interpretation Comments

 

             Blood Culture-Aerobic No organisms No growth                 Previo

us



             (test code = 17928-3) isolated                               prelim

inary



                                                                 verified result



                                                                 was Culture In



                                                                 Progress on



                                                                 2022 at 190

1



                                                                 CDTPrevious



                                                                 preliminary



                                                                 verified result



                                                                 was No growth a

t



                                                                 24 hours on



                                                                 2022 at 160

1



                                                                 CDTPrevious



                                                                 preliminary



                                                                 verified result



                                                                 was No growth a

t



                                                                 48 hours on



                                                                 2022 at 160

1



                                                                 CDTPrevious



                                                                 preliminary



                                                                 verified result



                                                                 was No growth a

t



                                                                 72 hours on



                                                                 2022 at 160

1



                                                                 CDT

 

             Blood        No organisms No growth                 Previous



             Culture-Anaerobic isolated                               preliminar

y



             (test code = 40447-5)                                        verifi

ed result



                                                                 was Culture In



                                                                 Progress on



                                                                 2022 at 190

1



                                                                 CDTPrevious



                                                                 preliminary



                                                                 verified result



                                                                 was No growth a

t



                                                                 24 hours on



                                                                 2022 at 160

1



                                                                 CDTPrevious



                                                                 preliminary



                                                                 verified result



                                                                 was No growth a

t



                                                                 48 hours on



                                                                 2022 at 160

1



                                                                 CDTPrevious



                                                                 preliminary



                                                                 verified result



                                                                 was No growth a

t



                                                                 72 hours on



                                                                 2022 at 160

1



                                                                 CDT

 

             Lab Interpretation Normal                                 



             (test code = 46552-4)                                        



[facility] GLUCOSE (AUTOMATED)2022 14:13:49





             Test Item    Value        Reference Range Interpretation Comments

 

             POCT GLU (test code = 3919609519) 176 mg/dL           H      

      

 

             Lab Interpretation (test code = Abnormal                           

    



             65694-4)                                            



[facility] GLUCOSE (AUTOMATED)2022 14:13:49





             Test Item    Value        Reference Range Interpretation Comments

 

             POCT GLU (test code = 6868265148) 176 mg/dL           H      

      

 

             Lab Interpretation (test code = Abnormal                           

    



             37668-2)                                            



[facility] GLUCOSE (AUTOMATED)2022 02:30:54





             Test Item    Value        Reference Range Interpretation Comments

 

             POCT GLU (test code = 8655749767) 113 mg/dL           H      

      

 

             Lab Interpretation (test code = Abnormal                           

    



             15106-7)                                            



[facility] GLUCOSE (AUTOMATED)2022 02:30:54





             Test Item    Value        Reference Range Interpretation Comments

 

             POCT GLU (test code = 3098337739) 113 mg/dL           H      

      

 

             Lab Interpretation (test code = Abnormal                           

    



             35701-6)                                            



[facility] GLUCOSE (AUTOMATED)2022 23:41:12





             Test Item    Value        Reference Range Interpretation Comments

 

             POCT GLU (test code = 7363240066) 135 mg/dL           H      

      

 

             Lab Interpretation (test code = Abnormal                           

    



             16453-0)                                            



[facility] GLUCOSE (AUTOMATED)2022 23:41:12





             Test Item    Value        Reference Range Interpretation Comments

 

             POCT GLU (test code = 8451668244) 135 mg/dL           H      

      

 

             Lab Interpretation (test code = Abnormal                           

    



             70270-4)                                            



[facility] GLUCOSE (AUTOMATED)2022 17:13:18





             Test Item    Value        Reference Range Interpretation Comments

 

             POCT GLU (test code = 3191733429) 238 mg/dL           H      

      

 

             Lab Interpretation (test code = Abnormal                           

    



             21139-7)                                            



[facility] GLUCOSE (AUTOMATED)2022 17:13:18





             Test Item    Value        Reference Range Interpretation Comments

 

             POCT GLU (test code = 2685123532) 238 mg/dL           H      

      

 

             Lab Interpretation (test code = Abnormal                           

    



             66938-2)                                            



[facility] GLUCOSE (AUTOMATED)2022 17:13:18





             Test Item    Value        Reference Range Interpretation Comments

 

             POCT GLU (test code = 2770191306) 238 mg/dL           H      

      

 

             Lab Interpretation (test code = Abnormal                           

    



             46484-2)                                            



[facility]-REACTIVE [id number] 16:50:53





             Test Item    Value        Reference Range Interpretation Comments

 

             CRP (test code = 0.9 mg/dL    See_Comment  H             [Automated

 message]



             5225905169)                                         The system [employer]



                                                                 generated this



                                                                 result transmit

huma



                                                                 reference range

:



                                                                 <=0.8. The refe

rence



                                                                 range was not u

sed



                                                                 to interpret th

is



                                                                 result as



                                                                 normal/abnormal

.

 

             Lab Interpretation (test Abnormal                               



             code = 08126-7)                                        



[facility]-REACTIVE [id number] 16:50:53





             Test Item    Value        Reference Range Interpretation Comments

 

             CRP (test code = 0.9 mg/dL    See_Comment  H             [Automated

 message]



             0793146013)                                         The system [employer]



                                                                 generated this



                                                                 result transmit

huma



                                                                 reference range

:



                                                                 <=0.8. The refe

rence



                                                                 range was not u

sed



                                                                 to interpret th

is



                                                                 result as



                                                                 normal/abnormal

.

 

             Lab Interpretation (test Abnormal                               



             code = 20521-9)                                        



[facility]-REACTIVE [id number] 16:50:53





             Test Item    Value        Reference Range Interpretation Comments

 

             CRP (test code = 0.9 mg/dL    See_Comment  H             [Automated

 message]



             8606713045)                                         The system [employer]



                                                                 generated this



                                                                 result transmit

huma



                                                                 reference range

:



                                                                 <=0.8. The refe

rence



                                                                 range was not u

sed



                                                                 to interpret th

is



                                                                 result as



                                                                 normal/abnormal

.

 

             Lab Interpretation (test Abnormal                               



             code = 57589-5)                                        



[facility] GLUCOSE (AUTOMATED)2022 15:55:49





             Test Item    Value        Reference Range Interpretation Comments

 

             POCT GLU (test code = 9700940102) 209 mg/dL           H      

      

 

             Lab Interpretation (test code = Abnormal                           

    



             47949-4)                                            



[facility] GLUCOSE (AUTOMATED)2022 15:55:49





             Test Item    Value        Reference Range Interpretation Comments

 

             POCT GLU (test code = 2828712846) 209 mg/dL           H      

      

 

             Lab Interpretation (test code = Abnormal                           

    



             21879-7)                                            



[facility] GLUCOSE (AUTOMATED)2022 00:58:44





             Test Item    Value        Reference Range Interpretation Comments

 

             POCT GLU (test code = 3744062404) 300 mg/dL           H      

      

 

             Lab Interpretation (test code = Abnormal                           

    



             10472-5)                                            



[facility] GLUCOSE (AUTOMATED)2022 00:58:44





             Test Item    Value        Reference Range Interpretation Comments

 

             POCT GLU (test code = 4260565606) 300 mg/dL           H      

      

 

             Lab Interpretation (test code = Abnormal                           

    



             75035-1)                                            



[facility] GLUCOSE (AUTOMATED)2022 21:28:03





             Test Item    Value        Reference Range Interpretation Comments

 

             POCT GLU (test code = 2086299741) 119 mg/dL           H      

      

 

             Lab Interpretation (test code = Abnormal                           

    



             88127-1)                                            



[facility] GLUCOSE (AUTOMATED)2022 21:28:03





             Test Item    Value        Reference Range Interpretation Comments

 

             POCT GLU (test code = 2288725058) 119 mg/dL           H      

      

 

             Lab Interpretation (test code = Abnormal                           

    



             53209-5)                                            



[facility] GLUCOSE (AUTOMATED)2022 16:51:05





             Test Item    Value        Reference Range Interpretation Comments

 

             POCT GLU (test code = 8066523431) 275 mg/dL           H      

      

 

             Lab Interpretation (test code = Abnormal                           

    



             84816-7)                                            



[facility] GLUCOSE (AUTOMATED)2022 16:51:05





             Test Item    Value        Reference Range Interpretation Comments

 

             POCT GLU (test code = 5025986492) 275 mg/dL           H      

      

 

             Lab Interpretation (test code = Abnormal                           

    



             65035-4)                                            



[facility] METABOLIC PANEL (NA, K, CL, CO2, 
GLUCOSE, BUN, CREATININE, CA)2022 15:51:39





             Test Item    Value        Reference Range Interpretation Comments

 

             NA (test code = 136 mmol/L   135-145                   



             8502570917)                                         

 

             K (test code = 3.7 mmol/L   3.5-5                     



             9558475286)                                         

 

             CL (test code = 111 mmol/L          H            



             0422000784)                                         

 

             CO2 TOTAL (test code = 21 mmol/L    23-31        L            



             2218901865)                                         

 

             AGAP (test code =              2-16                      



             5324136953)                                         

 

             BUN (test code = 9 mg/dL      7-23                      



             2230396159)                                         

 

             GLUCOSE (test code = 278 mg/dL           H            



             7899131369)                                         

 

             CREATININE (test code = 0.46 mg/dL   0.6-1.25     L            



             8817586533)                                         

 

             CALCIUM (test code = 8.2 mg/dL    8.6-10.6     L            



             9523178420)                                         

 

             eGFR (test code =              mL/min/1.73m2              



             4041158933)                                         

 

             MAL (test code = MAL) Association of                           



                          Glomerular Filtration                           



                          Rate (GFR) and Staging                           



                          of Kidney Disease*                           



                          +---------------------                           



                          --+-------------------                           



                          --+-------------------                           



                          ------+| GFR                           



                          (mL/min/1.73 m2) ?|                           



                          With Kidney Damage ?|                           



                          ?Without Kidney                           



                          Damage+---------------                           



                          --------+-------------                           



                          --------+-------------                           



                          ------------+| ?>90 ?                           



                          ? ? ? ? ? ? ? ?|                           



                          ?Stage one ? ? ? ? ?|                           



                          ? Normal ? ? ? ? ? ? ?                           



                          ?+--------------------                           



                          ---+------------------                           



                          ---+------------------                           



                          -------+| ?60-89 ? ? ?                           



                          ? ? ? ? ?| ?Stage two                           



                          ? ? ? ? ?| ? Decreased                           



                          GFR ? ? ? ?                            



                          +---------------------                           



                          --+-------------------                           



                          --+-------------------                           



                          ------+| ?30-59 ? ? ?                           



                          ? ? ? ? ?| ?Stage                           



                          three ? ? ? ?| ? Stage                           



                          three ? ? ? ? ?                           



                          +---------------------                           



                          --+-------------------                           



                          --+-------------------                           



                          ------+| ?15-29 ? ? ?                           



                          ? ? ? ? ?| ?Stage four                           



                          ? ? ? ? | ? Stage four                           



                          ? ? ? ? ?                              



                          ?+--------------------                           



                          ---+------------------                           



                          ---+------------------                           



                          -------+| ?<15 (or                           



                          dialysis) ? ?| ?Stage                           



                          five ? ? ? ? | ? Stage                           



                          five ? ? ? ? ?                           



                          ?+--------------------                           



                          ---+------------------                           



                          ---+------------------                           



                          -------+ *Each stage                           



                          assumes the associated                           



                          GFR level has been in                           



                          effect for at least                           



                          three months. ?Stages                           



                          1 to 5, with or                           



                          without kidney                           



                          disease, indicate                           



                          chronic kidney                           



                          disease. Notes:                           



                          Determination of                           



                          stages one and two                           



                          (with eGFR                             



                          >59mL/min/1.73 m2)                           



                          requires estimation of                           



                          kidney damage for at                           



                          least three months as                           



                          defined by structural                           



                          or functional                           



                          abnormalities of the                           



                          kidney, manifested by                           



                          either:Pathological                           



                          abnormalities or                           



                          Markers of kidney                           



                          damage (including                           



                          abnormalities in the                           



                          composition of the                           



                          blood or urine or                           



                          abnormalities in                           



                          imaging tests).                           

 

             Lab Interpretation Abnormal                               



             (test code = 37751-9)                                        



[facility] METABOLIC PANEL (NA, K, CL, CO2, 
GLUCOSE, BUN, CREATININE, CA)2022 15:51:39





             Test Item    Value        Reference Range Interpretation Comments

 

             NA (test code = 136 mmol/L   135-145                   



             8141327674)                                         

 

             K (test code = 3.7 mmol/L   3.5-5                     



             0902764325)                                         

 

             CL (test code = 111 mmol/L          H            



             2144846696)                                         

 

             CO2 TOTAL (test code = 21 mmol/L    23-31        L            



             1286568133)                                         

 

             AGAP (test code =              2-16                      



             6101506023)                                         

 

             BUN (test code = 9 mg/dL      7-23                      



             6686598398)                                         

 

             GLUCOSE (test code = 278 mg/dL           H            



             3042397738)                                         

 

             CREATININE (test code = 0.46 mg/dL   0.6-1.25     L            



             5215862781)                                         

 

             CALCIUM (test code = 8.2 mg/dL    8.6-10.6     L            



             3786353233)                                         

 

             eGFR (test code =              mL/min/1.73m2              



             4160325501)                                         

 

             MAL (test code = MAL) Association of                           



                          Glomerular Filtration                           



                          Rate (GFR) and Staging                           



                          of Kidney Disease*                           



                          +---------------------                           



                          --+-------------------                           



                          --+-------------------                           



                          ------+| GFR                           



                          (mL/min/1.73 m2) ?|                           



                          With Kidney Damage ?|                           



                          ?Without Kidney                           



                          Damage+---------------                           



                          --------+-------------                           



                          --------+-------------                           



                          ------------+| ?>90 ?                           



                          ? ? ? ? ? ? ? ?|                           



                          ?Stage one ? ? ? ? ?|                           



                          ? Normal ? ? ? ? ? ? ?                           



                          ?+--------------------                           



                          ---+------------------                           



                          ---+------------------                           



                          -------+| ?60-89 ? ? ?                           



                          ? ? ? ? ?| ?Stage two                           



                          ? ? ? ? ?| ? Decreased                           



                          GFR ? ? ? ?                            



                          +---------------------                           



                          --+-------------------                           



                          --+-------------------                           



                          ------+| ?30-59 ? ? ?                           



                          ? ? ? ? ?| ?Stage                           



                          three ? ? ? ?| ? Stage                           



                          three ? ? ? ? ?                           



                          +---------------------                           



                          --+-------------------                           



                          --+-------------------                           



                          ------+| ?15-29 ? ? ?                           



                          ? ? ? ? ?| ?Stage four                           



                          ? ? ? ? | ? Stage four                           



                          ? ? ? ? ?                              



                          ?+--------------------                           



                          ---+------------------                           



                          ---+------------------                           



                          -------+| ?<15 (or                           



                          dialysis) ? ?| ?Stage                           



                          five ? ? ? ? | ? Stage                           



                          five ? ? ? ? ?                           



                          ?+--------------------                           



                          ---+------------------                           



                          ---+------------------                           



                          -------+ *Each stage                           



                          assumes the associated                           



                          GFR level has been in                           



                          effect for at least                           



                          three months. ?Stages                           



                          1 to 5, with or                           



                          without kidney                           



                          disease, indicate                           



                          chronic kidney                           



                          disease. Notes:                           



                          Determination of                           



                          stages one and two                           



                          (with eGFR                             



                          >59mL/min/1.73 m2)                           



                          requires estimation of                           



                          kidney damage for at                           



                          least three months as                           



                          defined by structural                           



                          or functional                           



                          abnormalities of the                           



                          kidney, manifested by                           



                          either:Pathological                           



                          abnormalities or                           



                          Markers of kidney                           



                          damage (including                           



                          abnormalities in the                           



                          composition of the                           



                          blood or urine or                           



                          abnormalities in                           



                          imaging tests).                           

 

             Lab Interpretation Abnormal                               



             (test code = 20618-3)                                        



[facility] GLUCOSE (AUTOMATED)2022 12:36:29





             Test Item    Value        Reference Range Interpretation Comments

 

             POCT GLU (test code = 8121466031) 276 mg/dL           H      

      

 

             Lab Interpretation (test code = Abnormal                           

    



             91966-2)                                            



[facility] GLUCOSE (AUTOMATED)2022 12:36:29





             Test Item    Value        Reference Range Interpretation Comments

 

             POCT GLU (test code = 0330313722) 276 mg/dL           H      

      

 

             Lab Interpretation (test code = Abnormal                           

    



             74540-2)                                            



[facility] GLUCOSE (AUTOMATED)2022 01:29:51





             Test Item    Value        Reference Range Interpretation Comments

 

             POCT GLU (test code = 0813012520) 289 mg/dL           H      

      

 

             Lab Interpretation (test code = Abnormal                           

    



             95874-6)                                            



[facility] GLUCOSE (AUTOMATED)2022 01:29:51





             Test Item    Value        Reference Range Interpretation Comments

 

             POCT GLU (test code = 6429539428) 289 mg/dL           H      

      

 

             Lab Interpretation (test code = Abnormal                           

    



             95876-8)                                            



[facility] GLUCOSE (AUTOMATED)2022 21:24:38





             Test Item    Value        Reference Range Interpretation Comments

 

             POCT GLU (test code = 3965839701) 173 mg/dL           H      

      

 

             Lab Interpretation (test code = Abnormal                           

    



             06628-4)                                            



[facility] GLUCOSE (AUTOMATED)2022 21:24:38





             Test Item    Value        Reference Range Interpretation Comments

 

             POCT GLU (test code = 5908416734) 173 mg/dL           H      

      

 

             Lab Interpretation (test code = Abnormal                           

    



             82897-7)                                            



[facility]





             Test Item    Value        Reference Range Interpretation Comments

 

             POCT GLU (test code = 4180883867) 279 mg/dL           H      

      

 

             Lab Interpretation (test code = Abnormal                           

    



             00145-5)                                            



[facility]49





             Test Item    Value        Reference Range Interpretation Comments

 

             POCT GLU (test code = 2807409115) 279 mg/dL           H      

      

 

             Lab Interpretation (test code = Abnormal                           

    



             38391-7)                                            



[facility] GLUCOSE (AUTOMATED)2022 13:31:23





             Test Item    Value        Reference Range Interpretation Comments

 

             POCT GLU (test code = 3177425382) 281 mg/dL           H      

      

 

             Lab Interpretation (test code = Abnormal                           

    



             27648-0)                                            



[facility] GLUCOSE (AUTOMATED)2022 13:31:23





             Test Item    Value        Reference Range Interpretation Comments

 

             POCT GLU (test code = 4703199376) 281 mg/dL           H      

      

 

             Lab Interpretation (test code = Abnormal                           

    



             31973-4)                                            



[facility]35





             Test Item    Value        Reference Range Interpretation Comments

 

             POCT GLU (test code = 4791591359) 339 mg/dL           H      

      

 

             Lab Interpretation (test code = Abnormal                           

    



             80192-6)                                            



[facility] GLUCOSE (AUTOMATED)2022 10:02:35





             Test Item    Value        Reference Range Interpretation Comments

 

             POCT GLU (test code = 6933354136) 339 mg/dL           H      

      

 

             Lab Interpretation (test code = Abnormal                           

    



             22538-1)                                            



[facility]:22





             Test Item    Value        Reference Range Interpretation Comments

 

             POCT GLU (test code = 5034182684) 295 mg/dL           H      

      

 

             Lab Interpretation (test code = Abnormal                           

    



             93807-8)                                            



[facility]:22





             Test Item    Value        Reference Range Interpretation Comments

 

             POCT GLU (test code = 4922140406) 295 mg/dL           H      

      

 

             Lab Interpretation (test code = Abnormal                           

    



             45876-3)                                            



[facility] GLUCOSE (AUTOMATED)2022 01:54:18





             Test Item    Value        Reference Range Interpretation Comments

 

             POCT GLU (test code = 7694163731) 258 mg/dL           H      

      

 

             Lab Interpretation (test code = Abnormal                           

    



             67359-3)                                            



[facility] GLUCOSE (AUTOMATED)2022 01:54:18





             Test Item    Value        Reference Range Interpretation Comments

 

             POCT GLU (test code = 1513981751) 258 mg/dL           H      

      

 

             Lab Interpretation (test code = Abnormal                           

    



             59587-7)                                            



[facility] GLUCOSE (AUTOMATED)2022 23:03:09





             Test Item    Value        Reference Range Interpretation Comments

 

             POCT GLU (test code = 2850725028) 286 mg/dL           H      

      

 

             Lab Interpretation (test code = Abnormal                           

    



             13507-9)                                            



[facility] GLUCOSE (AUTOMATED)2022 23:03:09





             Test Item    Value        Reference Range Interpretation Comments

 

             POCT GLU (test code = 1710333362) 286 mg/dL           H      

      

 

             Lab Interpretation (test code = Abnormal                           

    



             40414-8)                                            



[facility] GLUCOSE (AUTOMATED)2022 20:48:08





             Test Item    Value        Reference Range Interpretation Comments

 

             POCT GLU (test code = 9017700562) 249 mg/dL           H      

      

 

             Lab Interpretation (test code = Abnormal                           

    



             81794-3)                                            



[facility] GLUCOSE (AUTOMATED)2022 20:48:08





             Test Item    Value        Reference Range Interpretation Comments

 

             POCT GLU (test code = 8418110343) 249 mg/dL           H      

      

 

             Lab Interpretation (test code = Abnormal                           

    



             93990-2)                                            



[id number] 17:01:47





             Test Item    Value        Reference Range Interpretation Comments

 

             BOH (test code = 1.0 mmol/L                             



             6775018441)                                         

 

             MAL (test code = Normal Ranges: ? ?                           



             MAL)         Nonfasting ? ? ? ? ? Less                           



                          than 0.1 mmol/L ? ?                           



                          Overnight Fast ? ? ? Less                           



                          than 0.4 mmol/L ? ? Fasting                           



                          (1-2 weeks) ?6-8 mmol/L Test                          

 



                          developed and                           



                          characteristics determined                           



                          by [facility].                          

 



[id number] 17:01:47





             Test Item    Value        Reference Range Interpretation Comments

 

             BOH (test code = 1.0 mmol/L                             



             9752288210)                                         

 

             MAL (test code = Normal Ranges: ? ?                           



             MAL)         Nonfasting ? ? ? ? ? Less                           



                          than 0.1 mmol/L ? ?                           



                          Overnight Fast ? ? ? Less                           



                          than 0.4 mmol/L ? ? Fasting                           



                          (1-2 weeks) ?6-8 mmol/L Test                          

 



                          developed and                           



                          characteristics determined                           



                          by [facility].                          

 



[id number] 17:01:47





             Test Item    Value        Reference Range Interpretation Comments

 

             BOH (test code = 1.0 mmol/L                             



             4378230670)                                         

 

             MAL (test code = Normal Ranges: ? ?                           



             MAL)         Nonfasting ? ? ? ? ? Less                           



                          than 0.1 mmol/L ? ?                           



                          Overnight Fast ? ? ? Less                           



                          than 0.4 mmol/L ? ? Fasting                           



                          (1-2 weeks) ?6-8 mmol/L Test                          

 



                          developed and                           



                          characteristics determined                           



                          by [facility].                          

 



[facility] GLUCOSE (AUTOMATED)2022 16:42:59





             Test Item    Value        Reference Range Interpretation Comments

 

             POCT GLU (test code = 2069109115) 264 mg/dL           H      

      

 

             Lab Interpretation (test code = Abnormal                           

    



             08977-9)                                            



[facility] GLUCOSE (AUTOMATED)2022 16:42:59





             Test Item    Value        Reference Range Interpretation Comments

 

             POCT GLU (test code = 7676500244) 264 mg/dL           H      

      

 

             Lab Interpretation (test code = Abnormal                           

    



             39313-6)                                            



[facility]Na, K, Cl, CO2, 
Glucose, BUN, Creatinine, Ca)2022 16:16:20





             Test Item    Value        Reference Range Interpretation Comments

 

             NA (test code = 137 mmol/L   135-145                   



             6355446822)                                         

 

             K (test code = 3.5 mmol/L   3.5-5                     



             3465455715)                                         

 

             CL (test code = 115 mmol/L          H            



             3895944197)                                         

 

             CO2 TOTAL (test code = 17 mmol/L    23-31        L            



             5006686309)                                         

 

             AGAP (test code =              2-16                      



             4967632706)                                         

 

             BUN (test code = 5 mg/dL      7-23         L            



             9581661277)                                         

 

             GLUCOSE (test code = 271 mg/dL           H            



             4443497796)                                         

 

             CREATININE (test code = 0.45 mg/dL   0.6-1.25     L            



             5394031576)                                         

 

             CALCIUM (test code = 8.5 mg/dL    8.6-10.6     L            



             5780985918)                                         

 

             eGFR (test code =              mL/min/1.73m2              



             2774733795)                                         

 

             MAL (test code = MAL) Association of                           



                          Glomerular Filtration                           



                          Rate (GFR) and Staging                           



                          of Kidney Disease*                           



                          +---------------------                           



                          --+-------------------                           



                          --+-------------------                           



                          ------+| GFR                           



                          (mL/min/1.73 m2) ?|                           



                          With Kidney Damage ?|                           



                          ?Without Kidney                           



                          Damage+---------------                           



                          --------+-------------                           



                          --------+-------------                           



                          ------------+| ?>90 ?                           



                          ? ? ? ? ? ? ? ?|                           



                          ?Stage one ? ? ? ? ?|                           



                          ? Normal ? ? ? ? ? ? ?                           



                          ?+--------------------                           



                          ---+------------------                           



                          ---+------------------                           



                          -------+| ?60-89 ? ? ?                           



                          ? ? ? ? ?| ?Stage two                           



                          ? ? ? ? ?| ? Decreased                           



                          GFR ? ? ? ?                            



                          +---------------------                           



                          --+-------------------                           



                          --+-------------------                           



                          ------+| ?30-59 ? ? ?                           



                          ? ? ? ? ?| ?Stage                           



                          three ? ? ? ?| ? Stage                           



                          three ? ? ? ? ?                           



                          +---------------------                           



                          --+-------------------                           



                          --+-------------------                           



                          ------+| ?15-29 ? ? ?                           



                          ? ? ? ? ?| ?Stage four                           



                          ? ? ? ? | ? Stage four                           



                          ? ? ? ? ?                              



                          ?+--------------------                           



                          ---+------------------                           



                          ---+------------------                           



                          -------+| ?<15 (or                           



                          dialysis) ? ?| ?Stage                           



                          five ? ? ? ? | ? Stage                           



                          five ? ? ? ? ?                           



                          ?+--------------------                           



                          ---+------------------                           



                          ---+------------------                           



                          -------+ *Each stage                           



                          assumes the associated                           



                          GFR level has been in                           



                          effect for at least                           



                          three months. ?Stages                           



                          1 to 5, with or                           



                          without kidney                           



                          disease, indicate                           



                          chronic kidney                           



                          disease. Notes:                           



                          Determination of                           



                          stages one and two                           



                          (with eGFR                             



                          >59mL/min/1.73 m2)                           



                          requires estimation of                           



                          kidney damage for at                           



                          least three months as                           



                          defined by structural                           



                          or functional                           



                          abnormalities of the                           



                          kidney, manifested by                           



                          either:Pathological                           



                          abnormalities or                           



                          Markers of kidney                           



                          damage (including                           



                          abnormalities in the                           



                          composition of the                           



                          blood or urine or                           



                          abnormalities in                           



                          imaging tests).                           

 

             Lab Interpretation Abnormal                               



             (test code = 05248-3)                                        



[facility] (Na, K, Cl, CO2, 
Glucose, BUN, Creatinine, Ca)2022 16:16:20





             Test Item    Value        Reference Range Interpretation Comments

 

             NA (test code = 137 mmol/L   135-145                   



             5578844110)                                         

 

             K (test code = 3.5 mmol/L   3.5-5                     



             3131731828)                                         

 

             CL (test code = 115 mmol/L          H            



             3839719580)                                         

 

             CO2 TOTAL (test code = 17 mmol/L    23-31        L            



             7307304555)                                         

 

             AGAP (test code =              2-16                      



             [id number])                                         

 

             BUN (test code = 5 mg/dL      7-23         L            



             1742575783)                                         

 

             GLUCOSE (test code = 271 mg/dL           H            



             [id number])                                         

 

             CREATININE (test code = 0.45 mg/dL   0.6-1.25     L            



             8469177803)                                         

 

             CALCIUM (test code = 8.5 mg/dL    8.6-10.6     L            



             2098829060)                                         

 

             eGFR (test code =              mL/min/1.73m2              



             4540783676)                                         

 

             MAL (test code = MAL) Association of                           



                          Glomerular Filtration                           



                          Rate (GFR) and Staging                           



                          of Kidney Disease*                           



                          +---------------------                           



                          --+-------------------                           



                          --+-------------------                           



                          ------+| GFR                           



                          (mL/min/1.73 m2) ?|                           



                          With Kidney Damage ?|                           



                          ?Without Kidney                           



                          Damage+---------------                           



                          --------+-------------                           



                          --------+-------------                           



                          ------------+| ?>90 ?                           



                          ? ? ? ? ? ? ? ?|                           



                          ?Stage one ? ? ? ? ?|                           



                          ? Normal ? ? ? ? ? ? ?                           



                          ?+--------------------                           



                          ---+------------------                           



                          ---+------------------                           



                          -------+| ?60-89 ? ? ?                           



                          ? ? ? ? ?| ?Stage two                           



                          ? ? ? ? ?| ? Decreased                           



                          GFR ? ? ? ?                            



                          +---------------------                           



                          --+-------------------                           



                          --+-------------------                           



                          ------+| ?30-59 ? ? ?                           



                          ? ? ? ? ?| ?Stage                           



                          three ? ? ? ?| ? Stage                           



                          three ? ? ? ? ?                           



                          +---------------------                           



                          --+-------------------                           



                          --+-------------------                           



                          ------+| ?15-29 ? ? ?                           



                          ? ? ? ? ?| ?Stage four                           



                          ? ? ? ? | ? Stage four                           



                          ? ? ? ? ?                              



                          ?+--------------------                           



                          ---+------------------                           



                          ---+------------------                           



                          -------+| ?<15 (or                           



                          dialysis) ? ?| ?Stage                           



                          five ? ? ? ? | ? Stage                           



                          five ? ? ? ? ?                           



                          ?+--------------------                           



                          ---+------------------                           



                          ---+------------------                           



                          -------+ *Each stage                           



                          assumes the associated                           



                          GFR level has been in                           



                          effect for at least                           



                          three months. ?Stages                           



                          1 to 5, with or                           



                          without kidney                           



                          disease, indicate                           



                          chronic kidney                           



                          disease. Notes:                           



                          Determination of                           



                          stages one and two                           



                          (with eGFR                             



                          >59mL/min/1.73 m2)                           



                          requires estimation of                           



                          kidney damage for at                           



                          least three months as                           



                          defined by structural                           



                          or functional                           



                          abnormalities of the                           



                          kidney, manifested by                           



                          either:Pathological                           



                          abnormalities or                           



                          Markers of kidney                           



                          damage (including                           



                          abnormalities in the                           



                          composition of the                           



                          blood or urine or                           



                          abnormalities in                           



                          imaging tests).                           

 

             Lab Interpretation Abnormal                               



             (test code = 21010-7)                                        



[facility] GLUCOSE (AUTOMATED)2022 14:21:27





             Test Item    Value        Reference Range Interpretation Comments

 

             POCT GLU (test code = 8656074287) 286 mg/dL           H      

      

 

             Lab Interpretation (test code = Abnormal                           

    



             87404-0)                                            



[facility] GLUCOSE (AUTOMATED)2022 14:21:27





             Test Item    Value        Reference Range Interpretation Comments

 

             POCT GLU (test code = 9550647906) 286 mg/dL           H      

      

 

             Lab Interpretation (test code = Abnormal                           

    



             30594-6)                                            



[facility] POSITIVE BLOOD PATHOGENS DNA 
[id number] 13:29:25





             Test Item    Value        Reference Range Interpretation Comments

 

             Coagulase Negative Positive     Negative, See A            



             Staphylococcus (test              Comment/Narrative              



             code = 95397-4)                                        

 

             MAL (test code = MAL)  Coagulase negative                          

 



                          Staphylococcus (CoNS)                           



                          detected by DNA probe.                           



                          ?CoNS often                            



                          contaminate blood                           



                          cultures from skin                           



                          colonization during                           



                          phlebotomy. ?Preferred                           



                          management is to                           



                          repeat blood cultures,                           



                          and monitor off                           



                          antibiotics.                           



                          ?Contamination is                           



                          suggested by culture                           



                          growth after 48 hours,                           



                          or growth in single                           



                          culture (i.e., one of                           



                          two sets). ?True                           



                          bacteremia is                           



                          suggested by the                           



                          fever, hypotension,                           



                          and leukocytosis that                           



                          are not explained by                           



                          an alternative                           



                          infection, or                           



                          indwelling foreign                           



                          devices that appear                           



                          infected (catheters,                           



                          lines, or prostheses).                           



                          Consider Infectious                           



                          Diseases consultation                           



                          if differentiation of                           



                          CoNS bacteremia from                           



                          contamination is                           



                          uncertain. If clinical                           



                          context suggests true                           



                          bacteremia, preferred                           



                          therapy is vancomycin.                           



                          Please contact the                           



                          Antimicrobial                           



                          Stewardship Program                           



                          with questions.Pager:                           



                          [phone] Testing                           



                          included eleven                           



                          identification and                           



                          three resistance                           



                          marker                                 



                          targets.______________                           



                          ______________________                           



                          ______________________                           



                          _____________                           

 

             Lab Interpretation Abnormal                               



             (test code = 70503-2)                                        



[facility] POSITIVE BLOOD PATHOGENS DNA 
[id number] 13:29:25





             Test Item    Value        Reference Range Interpretation Comments

 

             Coagulase Negative Positive     Negative, See A            



             Staphylococcus (test              Comment/Narrative              



             code = 56769-9)                                        

 

             MAL (test code = MAL)  Coagulase negative                          

 



                          Staphylococcus (CoNS)                           



                          detected by DNA probe.                           



                          ?CoNS often                            



                          contaminate blood                           



                          cultures from skin                           



                          colonization during                           



                          phlebotomy. ?Preferred                           



                          management is to                           



                          repeat blood cultures,                           



                          and monitor off                           



                          antibiotics.                           



                          ?Contamination is                           



                          suggested by culture                           



                          growth after 48 hours,                           



                          or growth in single                           



                          culture (i.e., one of                           



                          two sets). ?True                           



                          bacteremia is                           



                          suggested by the                           



                          fever, hypotension,                           



                          and leukocytosis that                           



                          are not explained by                           



                          an alternative                           



                          infection, or                           



                          indwelling foreign                           



                          devices that appear                           



                          infected (catheters,                           



                          lines, or prostheses).                           



                          Consider Infectious                           



                          Diseases consultation                           



                          if differentiation of                           



                          CoNS bacteremia from                           



                          contamination is                           



                          uncertain. If clinical                           



                          context suggests true                           



                          bacteremia, preferred                           



                          therapy is vancomycin.                           



                          Please contact the                           



                          Antimicrobial                           



                          Stewardship Program                           



                          with questions.Pager:                           



                          [phone] Testing                           



                          included eleven                           



                          identification and                           



                          three resistance                           



                          marker                                 



                          targets.______________                           



                          ______________________                           



                          ______________________                           



                          _____________                           

 

             Lab Interpretation Abnormal                               



             (test code = 45908-0)                                        



[facility] 
[id number] 13:29:25





             Test Item    Value        Reference Range Interpretation Comments

 

             Coagulase Negative Positive     Negative, See A            



             Staphylococcus (test              Comment/Narrative              



             code = 98424-4)                                        

 

             MAL (test code = MAL)  Coagulase negative                          

 



                          Staphylococcus (CoNS)                           



                          detected by DNA probe.                           



                          ?CoNS often                            



                          contaminate blood                           



                          cultures from skin                           



                          colonization during                           



                          phlebotomy. ?Preferred                           



                          management is to                           



                          repeat blood cultures,                           



                          and monitor off                           



                          antibiotics.                           



                          ?Contamination is                           



                          suggested by culture                           



                          growth after 48 hours,                           



                          or growth in single                           



                          culture (i.e., one of                           



                          two sets). ?True                           



                          bacteremia is                           



                          suggested by the                           



                          fever, hypotension,                           



                          and leukocytosis that                           



                          are not explained by                           



                          an alternative                           



                          infection, or                           



                          indwelling foreign                           



                          devices that appear                           



                          infected (catheters,                           



                          lines, or prostheses).                           



                          Consider Infectious                           



                          Diseases consultation                           



                          if differentiation of                           



                          CoNS bacteremia from                           



                          contamination is                           



                          uncertain. If clinical                           



                          context suggests true                           



                          bacteremia, preferred                           



                          therapy is vancomycin.                           



                          Please contact the                           



                          Antimicrobial                           



                          Stewardship Program                           



                          with questions.Pager:                           



                          [phone] Testing                           



                          included eleven                           



                          identification and                           



                          three resistance                           



                          marker                                 



                          targets.______________                           



                          ______________________                           



                          ______________________                           



                          _____________                           

 

             Lab Interpretation Abnormal                               



             (test code = 87614-2)                                        



[facility]





             Test Item    Value        Reference Range Interpretation Comments

 

             POCT GLU (test code = 1975092322) 307 mg/dL           H      

      

 

             Lab Interpretation (test code = Abnormal                           

    



             07661-3)                                            



[facility] GLUCOSE (AUTOMATED)2022 13:03:22





             Test Item    Value        Reference Range Interpretation Comments

 

             POCT GLU (test code = 1568843454) 307 mg/dL           H      

      

 

             Lab Interpretation (test code = Abnormal                           

    



             51144-7)                                            



[facility] GLUCOSE (AUTOMATED)2022 09:05:19





             Test Item    Value        Reference Range Interpretation Comments

 

             POCT GLU (test code = 5698206866) 326 mg/dL           H      

      

 

             Lab Interpretation (test code = Abnormal                           

    



             92132-4)                                            



[facility] GLUCOSE (AUTOMATED)2022 09:05:19





             Test Item    Value        Reference Range Interpretation Comments

 

             POCT GLU (test code = 4341032968) 326 mg/dL           H      

      

 

             Lab Interpretation (test code = Abnormal                           

    



             48419-1)                                            



[facility] GLUCOSE (AUTOMATED)2022 05:46:58





             Test Item    Value        Reference Range Interpretation Comments

 

             POCT GLU (test code = 1128318887) 341 mg/dL           H      

      

 

             Lab Interpretation (test code = Abnormal                           

    



             12529-0)                                            



[facility] GLUCOSE (AUTOMATED)2022 05:46:58





             Test Item    Value        Reference Range Interpretation Comments

 

             POCT GLU (test code = 2952009790) 341 mg/dL           H      

      

 

             Lab Interpretation (test code = Abnormal                           

    



             29872-1)                                            



[facility] GLUCOSE (AUTOMATED)2022 01:37:20





             Test Item    Value        Reference Range Interpretation Comments

 

             POCT GLU (test code = 7859204822) 196 mg/dL           H      

      

 

             Lab Interpretation (test code = Abnormal                           

    



             04077-0)                                            



[facility] GLUCOSE (AUTOMATED)2022 01:37:20





             Test Item    Value        Reference Range Interpretation Comments

 

             POCT GLU (test code = 3295858664) 196 mg/dL           H      

      

 

             Lab Interpretation (test code = Abnormal                           

    



             30698-0)                                            



[facility] GLUCOSE (AUTOMATED)2022 23:44:41





             Test Item    Value        Reference Range Interpretation Comments

 

             POCT GLU (test code = 0439438487) 138 mg/dL           H      

      

 

             Lab Interpretation (test code = Abnormal                           

    



             57125-5)                                            



[facility] (AUTOMATED)2022 23:44:41





             Test Item    Value        Reference Range Interpretation Comments

 

             POCT GLU (test code = 8474646388) 138 mg/dL           H      

      

 

             Lab Interpretation (test code = Abnormal                           

    



             60231-8)                                            



[facility] Metabolic Panel (Na, K, Cl, CO2, 
Glucose, BUN, Creatinine, Ca)2022 23:12:45





             Test Item    Value        Reference Range Interpretation Comments

 

             NA (test code = 138 mmol/L   135-145                   



             [id number])                                         

 

             K (test code = 3.7 mmol/L   3.5-5                     



             0959331069)                                         

 

             CL (test code = 115 mmol/L          H            



             1389406194)                                         

 

             CO2 TOTAL (test code = 17 mmol/L    23-31        L            



             0915260718)                                         

 

             AGAP (test code =              2-16                      



             5161669621)                                         

 

             BUN (test code = 5 mg/dL      7-23         L            



             8792723536)                                         

 

             GLUCOSE (test code = 86 mg/dL                         



             4595496063)                                         

 

             CREATININE (test code = 0.46 mg/dL   0.6-1.25     L            



             4555136295)                                         

 

             CALCIUM (test code = 8.4 mg/dL    8.6-10.6     L            



             8593855751)                                         

 

             eGFR (test code =              mL/min/1.73m2              



             2057881022)                                         

 

             MAL (test code = MAL) Association of                           



                          Glomerular Filtration                           



                          Rate (GFR) and Staging                           



                          of Kidney Disease*                           



                          +---------------------                           



                          --+-------------------                           



                          --+-------------------                           



                          ------+| GFR                           



                          (mL/min/1.73 m2) ?|                           



                          With Kidney Damage ?|                           



                          ?Without Kidney                           



                          Damage+---------------                           



                          --------+-------------                           



                          --------+-------------                           



                          ------------+| ?>90 ?                           



                          ? ? ? ? ? ? ? ?|                           



                          ?Stage one ? ? ? ? ?|                           



                          ? Normal ? ? ? ? ? ? ?                           



                          ?+--------------------                           



                          ---+------------------                           



                          ---+------------------                           



                          -------+| ?60-89 ? ? ?                           



                          ? ? ? ? ?| ?Stage two                           



                          ? ? ? ? ?| ? Decreased                           



                          GFR ? ? ? ?                            



                          +---------------------                           



                          --+-------------------                           



                          --+-------------------                           



                          ------+| ?30-59 ? ? ?                           



                          ? ? ? ? ?| ?Stage                           



                          three ? ? ? ?| ? Stage                           



                          three ? ? ? ? ?                           



                          +---------------------                           



                          --+-------------------                           



                          --+-------------------                           



                          ------+| ?15-29 ? ? ?                           



                          ? ? ? ? ?| ?Stage four                           



                          ? ? ? ? | ? Stage four                           



                          ? ? ? ? ?                              



                          ?+--------------------                           



                          ---+------------------                           



                          ---+------------------                           



                          -------+| ?<15 (or                           



                          dialysis) ? ?| ?Stage                           



                          five ? ? ? ? | ? Stage                           



                          five ? ? ? ? ?                           



                          ?+--------------------                           



                          ---+------------------                           



                          ---+------------------                           



                          -------+ *Each stage                           



                          assumes the associated                           



                          GFR level has been in                           



                          effect for at least                           



                          three months. ?Stages                           



                          1 to 5, with or                           



                          without kidney                           



                          disease, indicate                           



                          chronic kidney                           



                          disease. Notes:                           



                          Determination of                           



                          stages one and two                           



                          (with eGFR                             



                          >59mL/min/1.73 m2)                           



                          requires estimation of                           



                          kidney damage for at                           



                          least three months as                           



                          defined by structural                           



                          or functional                           



                          abnormalities of the                           



                          kidney, manifested by                           



                          either:Pathological                           



                          abnormalities or                           



                          Markers of kidney                           



                          damage (including                           



                          abnormalities in the                           



                          composition of the                           



                          blood or urine or                           



                          abnormalities in                           



                          imaging tests).                           

 

             Lab Interpretation Abnormal                               



             (test code = 41733-6)                                        



[facility] Metabolic Panel (Na, K, Cl, CO2, 
Glucose, BUN, Creatinine, Ca)2022 23:12:45





             Test Item    Value        Reference Range Interpretation Comments

 

             NA (test code = 138 mmol/L   135-145                   



             6785991499)                                         

 

             K (test code = 3.7 mmol/L   3.5-5                     



             7151915061)                                         

 

             CL (test code = 115 mmol/L          H            



             5518100717)                                         

 

             CO2 TOTAL (test code = 17 mmol/L    23-31        L            



             7640192880)                                         

 

             AGAP (test code =              2-16                      



             4138902186)                                         

 

             BUN (test code = 5 mg/dL      7-23         L            



             1965188950)                                         

 

             GLUCOSE (test code = 86 mg/dL                         



             3485293287)                                         

 

             CREATININE (test code = 0.46 mg/dL   0.6-1.25     L            



             8737933218)                                         

 

             CALCIUM (test code = 8.4 mg/dL    8.6-10.6     L            



             0026683192)                                         

 

             eGFR (test code =              mL/min/1.73m2              



             7052848238)                                         

 

             MAL (test code = MAL) Association of                           



                          Glomerular Filtration                           



                          Rate (GFR) and Staging                           



                          of Kidney Disease*                           



                          +---------------------                           



                          --+-------------------                           



                          --+-------------------                           



                          ------+| GFR                           



                          (mL/min/1.73 m2) ?|                           



                          With Kidney Damage ?|                           



                          ?Without Kidney                           



                          Damage+---------------                           



                          --------+-------------                           



                          --------+-------------                           



                          ------------+| ?>90 ?                           



                          ? ? ? ? ? ? ? ?|                           



                          ?Stage one ? ? ? ? ?|                           



                          ? Normal ? ? ? ? ? ? ?                           



                          ?+--------------------                           



                          ---+------------------                           



                          ---+------------------                           



                          -------+| ?60-89 ? ? ?                           



                          ? ? ? ? ?| ?Stage two                           



                          ? ? ? ? ?| ? Decreased                           



                          GFR ? ? ? ?                            



                          +---------------------                           



                          --+-------------------                           



                          --+-------------------                           



                          ------+| ?30-59 ? ? ?                           



                          ? ? ? ? ?| ?Stage                           



                          three ? ? ? ?| ? Stage                           



                          three ? ? ? ? ?                           



                          +---------------------                           



                          --+-------------------                           



                          --+-------------------                           



                          ------+| ?15-29 ? ? ?                           



                          ? ? ? ? ?| ?Stage four                           



                          ? ? ? ? | ? Stage four                           



                          ? ? ? ? ?                              



                          ?+--------------------                           



                          ---+------------------                           



                          ---+------------------                           



                          -------+| ?<15 (or                           



                          dialysis) ? ?| ?Stage                           



                          five ? ? ? ? | ? Stage                           



                          five ? ? ? ? ?                           



                          ?+--------------------                           



                          ---+------------------                           



                          ---+------------------                           



                          -------+ *Each stage                           



                          assumes the associated                           



                          GFR level has been in                           



                          effect for at least                           



                          three months. ?Stages                           



                          1 to 5, with or                           



                          without kidney                           



                          disease, indicate                           



                          chronic kidney                           



                          disease. Notes:                           



                          Determination of                           



                          stages one and two                           



                          (with eGFR                             



                          >59mL/min/1.73 m2)                           



                          requires estimation of                           



                          kidney damage for at                           



                          least three months as                           



                          defined by structural                           



                          or functional                           



                          abnormalities of the                           



                          kidney, manifested by                           



                          either:Pathological                           



                          abnormalities or                           



                          Markers of kidney                           



                          damage (including                           



                          abnormalities in the                           



                          composition of the                           



                          blood or urine or                           



                          abnormalities in                           



                          imaging tests).                           

 

             Lab Interpretation Abnormal                               



             (test code = 57278-9)                                        



[facility] GLUCOSE (AUTOMATED)2022 22:38:52





             Test Item    Value        Reference Range Interpretation Comments

 

             POCT GLU (test code = 1855390428) 94 mg/dL                   

      

 

             Lab Interpretation (test code = Normal                             

    



             49479-9)                                            



[facility] GLUCOSE (AUTOMATED)2022 22:38:52





             Test Item    Value        Reference Range Interpretation Comments

 

             POCT GLU (test code = 6115792952) 94 mg/dL                   

      

 

             Lab Interpretation (test code = Normal                             

    



             24157-3)                                            



[facility] GLUCOSE (AUTOMATED)2022 21:31:35





             Test Item    Value        Reference Range Interpretation Comments

 

             POCT GLU (test code = 7151036604) 119 mg/dL           H      

      

 

             Lab Interpretation (test code = Abnormal                           

    



             87273-2)                                            



[facility] GLUCOSE (AUTOMATED)2022 21:31:35





             Test Item    Value        Reference Range Interpretation Comments

 

             POCT GLU (test code = 5997590579) 119 mg/dL           H      

      

 

             Lab Interpretation (test code = Abnormal                           

    



             39385-9)                                            



[facility] GLUCOSE (AUTOMATED)2022 20:06:03





             Test Item    Value        Reference Range Interpretation Comments

 

             POCT GLU (test code = 4386849680) 177 mg/dL           H      

      

 

             Lab Interpretation (test code = Abnormal                           

    



             27525-0)                                            



[facility] GLUCOSE (AUTOMATED)2022 20:06:03





             Test Item    Value        Reference Range Interpretation Comments

 

             POCT GLU (test code = 4572561774) 177 mg/dL           H      

      

 

             Lab Interpretation (test code = Abnormal                           

    



             40204-3)                                            



[facility] GLUCOSE (AUTOMATED)2022 19:07:20





             Test Item    Value        Reference Range Interpretation Comments

 

             POCT GLU (test code = 7414793134) 185 mg/dL           H      

      

 

             Lab Interpretation (test code = Abnormal                           

    



             89522-2)                                            



[facility] GLUCOSE (AUTOMATED)2022 19:07:20





             Test Item    Value        Reference Range Interpretation Comments

 

             POCT GLU (test code = 0100929568) 185 mg/dL           H      

      

 

             Lab Interpretation (test code = Abnormal                           

    



             09741-2)                                            



[facility]  18:44:53





             Test Item    Value        Reference    Interpretation Comments



                                       Range                     

 

             TROPONIN I (test 0.000 ng/mL  See_Comment                [Automated



             code = 0476101765)                                        message] 

The



                                                                 system which



                                                                 generated this



                                                                 result



                                                                 transmitted



                                                                 reference range

:



                                                                 <=0.034. The



                                                                 reference range



                                                                 was not used to



                                                                 interpret this



                                                                 result as



                                                                 normal/abnormal

.

 

             MAL (test code = Reference (Normal)                           



             MAL)         Range (defined by                           



                          the 99th percentile                           



                          reference limit): <=                           



                          0.034 ng/mL Note:                           



                          Cardiac troponin                           



                          begins to rise 3-4                           



                          hours after the                           



                          onset of ischemia.                           



                          Repeat in 4-6 hours                           



                          if the sample was                           



                          drawn within 3-4                           



                          hours of the onset                           



                          of the symptom and                           



                          found normal.                           



                          Diagnosis of                           



                          myocardial injury is                           



                          made with acute                           



                          changes in cTn                           



                          concentrations with                           



                          at least one serial                           



                          sample above the                           



                          99th percentile                           



                          upper reference                           



                          limit (URL), taken                           



                          together with the                           



                          patient's clinical                           



                          presentation. Biotin                           



                          has been reported to                           



                          cause a negative                           



                          bias, interpret                           



                          results relative to                           



                          patient's use of                           



                          biotin.                                

 

             Lab Interpretation Normal                                 



             (test code =                                        



             33573-1)                                            



[facility] -75-46 18:44:53





             Test Item    Value        Reference    Interpretation Comments



                                       Range                     

 

             TROPONIN I (test 0.000 ng/mL  See_Comment                [Automated



             code = 1180387279)                                        message] 

The



                                                                 system which



                                                                 generated this



                                                                 result



                                                                 transmitted



                                                                 reference range

:



                                                                 <=0.034. The



                                                                 reference range



                                                                 was not used to



                                                                 interpret this



                                                                 result as



                                                                 normal/abnormal

.

 

             MAL (test code = Reference (Normal)                           



             MAL)         Range (defined by                           



                          the 99th percentile                           



                          reference limit): <=                           



                          0.034 ng/mL Note:                           



                          Cardiac troponin                           



                          begins to rise 3-4                           



                          hours after the                           



                          onset of ischemia.                           



                          Repeat in 4-6 hours                           



                          if the sample was                           



                          drawn within 3-4                           



                          hours of the onset                           



                          of the symptom and                           



                          found normal.                           



                          Diagnosis of                           



                          myocardial injury is                           



                          made with acute                           



                          changes in cTn                           



                          concentrations with                           



                          at least one serial                           



                          sample above the                           



                          99th percentile                           



                          upper reference                           



                          limit (URL), taken                           



                          together with the                           



                          patient's clinical                           



                          presentation. Biotin                           



                          has been reported to                           



                          cause a negative                           



                          bias, interpret                           



                          results relative to                           



                          patient's use of                           



                          biotin.                                

 

             Lab Interpretation Normal                                 



             (test code =                                        



             11507-5)                                            



[facility]





             Test Item    Value        Reference    Interpretation Comments



                                       Range                     

 

             TROPONIN I (test 0.000 ng/mL  See_Comment                [Automated



             code = [id number])                                        message] 

The



                                                                 system which



                                                                 generated this



                                                                 result



                                                                 transmitted



                                                                 reference range

:



                                                                 <=0.034. The



                                                                 reference range



                                                                 was not used to



                                                                 interpret this



                                                                 result as



                                                                 normal/abnormal

.

 

             MAL (test code = Reference (Normal)                           



             MAL)         Range (defined by                           



                          the 99th percentile                           



                          reference limit): <=                           



                          0.034 ng/mL Note:                           



                          Cardiac troponin                           



                          begins to rise 3-4                           



                          hours after the                           



                          onset of ischemia.                           



                          Repeat in 4-6 hours                           



                          if the sample was                           



                          drawn within 3-4                           



                          hours of the onset                           



                          of the symptom and                           



                          found normal.                           



                          Diagnosis of                           



                          myocardial injury is                           



                          made with acute                           



                          changes in cTn                           



                          concentrations with                           



                          at least one serial                           



                          sample above the                           



                          99th percentile                           



                          upper reference                           



                          limit (URL), taken                           



                          together with the                           



                          patient's clinical                           



                          presentation. Biotin                           



                          has been reported to                           



                          cause a negative                           



                          bias, interpret                           



                          results relative to                           



                          patient's use of                           



                          biotin.                                

 

             Lab Interpretation Normal                                 



             (test code =                                        



             81118-7)                                            



[facility]Na, K, Cl, CO2, 
Glucose, BUN, Creatinine, Ca)2022 18:27:49





             Test Item    Value        Reference Range Interpretation Comments

 

             NA (test code = 138 mmol/L   135-145                   



             [id number])                                         

 

             K (test code = 3.7 mmol/L   3.5-5                     



             0711164538)                                         

 

             CL (test code = 115 mmol/L          H            



             4391221907)                                         

 

             CO2 TOTAL (test code = 16 mmol/L    23-31        L            



             2326765924)                                         

 

             AGAP (test code =              2-16                      



             3281272304)                                         

 

             BUN (test code = 7 mg/dL      7-23                      



             4189930468)                                         

 

             GLUCOSE (test code = 195 mg/dL           H            



             [id number])                                         

 

             CREATININE (test code = 0.52 mg/dL   0.6-1.25     L            



             6606077376)                                         

 

             CALCIUM (test code = 8.2 mg/dL    8.6-10.6     L            



             5840705060)                                         

 

             eGFR (test code =              mL/min/1.73m2              



             4672706457)                                         

 

             MAL (test code = MAL) Association of                           



                          Glomerular Filtration                           



                          Rate (GFR) and Staging                           



                          of Kidney Disease*                           



                          +---------------------                           



                          --+-------------------                           



                          --+-------------------                           



                          ------+| GFR                           



                          (mL/min/1.73 m2) ?|                           



                          With Kidney Damage ?|                           



                          ?Without Kidney                           



                          Damage+---------------                           



                          --------+-------------                           



                          --------+-------------                           



                          ------------+| ?>90 ?                           



                          ? ? ? ? ? ? ? ?|                           



                          ?Stage one ? ? ? ? ?|                           



                          ? Normal ? ? ? ? ? ? ?                           



                          ?+--------------------                           



                          ---+------------------                           



                          ---+------------------                           



                          -------+| ?60-89 ? ? ?                           



                          ? ? ? ? ?| ?Stage two                           



                          ? ? ? ? ?| ? Decreased                           



                          GFR ? ? ? ?                            



                          +---------------------                           



                          --+-------------------                           



                          --+-------------------                           



                          ------+| ?30-59 ? ? ?                           



                          ? ? ? ? ?| ?Stage                           



                          three ? ? ? ?| ? Stage                           



                          three ? ? ? ? ?                           



                          +---------------------                           



                          --+-------------------                           



                          --+-------------------                           



                          ------+| ?15-29 ? ? ?                           



                          ? ? ? ? ?| ?Stage four                           



                          ? ? ? ? | ? Stage four                           



                          ? ? ? ? ?                              



                          ?+--------------------                           



                          ---+------------------                           



                          ---+------------------                           



                          -------+| ?<15 (or                           



                          dialysis) ? ?| ?Stage                           



                          five ? ? ? ? | ? Stage                           



                          five ? ? ? ? ?                           



                          ?+--------------------                           



                          ---+------------------                           



                          ---+------------------                           



                          -------+ *Each stage                           



                          assumes the associated                           



                          GFR level has been in                           



                          effect for at least                           



                          three months. ?Stages                           



                          1 to 5, with or                           



                          without kidney                           



                          disease, indicate                           



                          chronic kidney                           



                          disease. Notes:                           



                          Determination of                           



                          stages one and two                           



                          (with eGFR                             



                          >59mL/min/1.73 m2)                           



                          requires estimation of                           



                          kidney damage for at                           



                          least three months as                           



                          defined by structural                           



                          or functional                           



                          abnormalities of the                           



                          kidney, manifested by                           



                          either:Pathological                           



                          abnormalities or                           



                          Markers of kidney                           



                          damage (including                           



                          abnormalities in the                           



                          composition of the                           



                          blood or urine or                           



                          abnormalities in                           



                          imaging tests).                           

 

             Lab Interpretation Abnormal                               



             (test code = 49534-3)                                        



[facility] (Na, K, Cl, CO2, 
Glucose, BUN, Creatinine, Ca)2022 18:27:49





             Test Item    Value        Reference Range Interpretation Comments

 

             NA (test code = 138 mmol/L   135-145                   



             [id number])                                         

 

             K (test code = 3.7 mmol/L   3.5-5                     



             9781634708)                                         

 

             CL (test code = 115 mmol/L          H            



             3600616400)                                         

 

             CO2 TOTAL (test code = 16 mmol/L    23-31        L            



             1538358452)                                         

 

             AGAP (test code =              2-16                      



             4877331168)                                         

 

             BUN (test code = 7 mg/dL      7-23                      



             4151447147)                                         

 

             GLUCOSE (test code = 195 mg/dL           H            



             [id number])                                         

 

             CREATININE (test code = 0.52 mg/dL   0.6-1.25     L            



             0449484384)                                         

 

             CALCIUM (test code = 8.2 mg/dL    8.6-10.6     L            



             4419747707)                                         

 

             eGFR (test code =              mL/min/1.73m2              



             3875851603)                                         

 

             MAL (test code = MAL) Association of                           



                          Glomerular Filtration                           



                          Rate (GFR) and Staging                           



                          of Kidney Disease*                           



                          +---------------------                           



                          --+-------------------                           



                          --+-------------------                           



                          ------+| GFR                           



                          (mL/min/1.73 m2) ?|                           



                          With Kidney Damage ?|                           



                          ?Without Kidney                           



                          Damage+---------------                           



                          --------+-------------                           



                          --------+-------------                           



                          ------------+| ?>90 ?                           



                          ? ? ? ? ? ? ? ?|                           



                          ?Stage one ? ? ? ? ?|                           



                          ? Normal ? ? ? ? ? ? ?                           



                          ?+--------------------                           



                          ---+------------------                           



                          ---+------------------                           



                          -------+| ?60-89 ? ? ?                           



                          ? ? ? ? ?| ?Stage two                           



                          ? ? ? ? ?| ? Decreased                           



                          GFR ? ? ? ?                            



                          +---------------------                           



                          --+-------------------                           



                          --+-------------------                           



                          ------+| ?30-59 ? ? ?                           



                          ? ? ? ? ?| ?Stage                           



                          three ? ? ? ?| ? Stage                           



                          three ? ? ? ? ?                           



                          +---------------------                           



                          --+-------------------                           



                          --+-------------------                           



                          ------+| ?15-29 ? ? ?                           



                          ? ? ? ? ?| ?Stage four                           



                          ? ? ? ? | ? Stage four                           



                          ? ? ? ? ?                              



                          ?+--------------------                           



                          ---+------------------                           



                          ---+------------------                           



                          -------+| ?<15 (or                           



                          dialysis) ? ?| ?Stage                           



                          five ? ? ? ? | ? Stage                           



                          five ? ? ? ? ?                           



                          ?+--------------------                           



                          ---+------------------                           



                          ---+------------------                           



                          -------+ *Each stage                           



                          assumes the associated                           



                          GFR level has been in                           



                          effect for at least                           



                          three months. ?Stages                           



                          1 to 5, with or                           



                          without kidney                           



                          disease, indicate                           



                          chronic kidney                           



                          disease. Notes:                           



                          Determination of                           



                          stages one and two                           



                          (with eGFR                             



                          >59mL/min/1.73 m2)                           



                          requires estimation of                           



                          kidney damage for at                           



                          least three months as                           



                          defined by structural                           



                          or functional                           



                          abnormalities of the                           



                          kidney, manifested by                           



                          either:Pathological                           



                          abnormalities or                           



                          Markers of kidney                           



                          damage (including                           



                          abnormalities in the                           



                          composition of the                           



                          blood or urine or                           



                          abnormalities in                           



                          imaging tests).                           

 

             Lab Interpretation Abnormal                               



             (test code = 57575-0)                                        



[id number]





             Test Item    Value        Reference Range Interpretation Comments

 

             BOH (test code = 3.3 mmol/L                             



             [id number])                                         

 

             MAL (test code = Normal Ranges: ? ?                           



             MAL)         Nonfasting ? ? ? ? ? Less                           



                          than 0.1 mmol/L ? ?                           



                          Overnight Fast ? ? ? Less                           



                          than 0.4 mmol/L ? ? Fasting                           



                          (1-2 weeks) ?6-8 mmol/L Test                          

 



                          developed and                           



                          characteristics determined                           



                          by [facility].                          

 



[facility] 17:43:01





             Test Item    Value        Reference Range Interpretation Comments

 

             BOH (test code = 3.3 mmol/L                             



             [id number])                                         

 

             MAL (test code = Normal Ranges: ? ?                           



             MAL)         Nonfasting ? ? ? ? ? Less                           



                          than 0.1 mmol/L ? ?                           



                          Overnight Fast ? ? ? Less                           



                          than 0.4 mmol/L ? ? Fasting                           



                          (1-2 weeks) ?6-8 mmol/L                           



                          Test developed and                           



                          characteristics determined                           



                          by [facility].                          

 



[facility] GLUCOSE (AUTOMATED)2022 17:29:05





             Test Item    Value        Reference Range Interpretation Comments

 

             POCT GLU (test code = 9470656605) 192 mg/dL           H      

      

 

             Lab Interpretation (test code = Abnormal                           

    



             40062-2)                                            



[facility] GLUCOSE (AUTOMATED)2022 17:29:05





             Test Item    Value        Reference Range Interpretation Comments

 

             POCT GLU (test code = 5740576547) 192 mg/dL           H      

      

 

             Lab Interpretation (test code = Abnormal                           

    



             25635-1)                                            



[id number]





             Test Item    Value        Reference Range Interpretation Comments

 

             OSMOLALITY (test code =              See_Comment  H             [Au

tomated message]



             2692-2)                                             The system [employer]



                                                                 generated this 

result



                                                                 transmitted ref

erence



                                                                 range: 278 - 30

5



                                                                 mOsm/kg. The



                                                                 reference range

 was



                                                                 not used to int

erpret



                                                                 this result as



                                                                 normal/abnormal

.

 

             Lab Interpretation (test Abnormal                               



             code = 84480-3)                                        



[id number]42





             Test Item    Value        Reference Range Interpretation Comments

 

             OSMOLALITY (test code =              See_Comment  H             [Au

tomated message]



             2692-2)                                             The system [employer]



                                                                 generated this 

result



                                                                 transmitted ref

erence



                                                                 range: 278 - 30

5



                                                                 mOsm/kg. The



                                                                 reference range

 was



                                                                 not used to int

erpret



                                                                 this result as



                                                                 normal/abnormal

.

 

             Lab Interpretation (test Abnormal                               



             code = 57916-6)                                        



[id number]42





             Test Item    Value        Reference Range Interpretation Comments

 

             OSMOLALITY (test code =              See_Comment  H             [Au

tomated message]



             2692-2)                                             The system [employer]



                                                                 generated this 

result



                                                                 transmitted ref

erence



                                                                 range: 278 - 30

5



                                                                 mOsm/kg. The



                                                                 reference range

 was



                                                                 not used to int

erpret



                                                                 this result as



                                                                 normal/abnormal

.

 

             Lab Interpretation (test Abnormal                               



             code = 51740-4)                                        



[facility] Metabolic Panel (Na, K, Cl, CO2, 
Glucose, BUN, Creatinine, Ca)2022 16:02:27





             Test Item    Value        Reference Range Interpretation Comments

 

             NA (test code = 138 mmol/L   135-145                   



             3344495961)                                         

 

             K (test code = 3.7 mmol/L   3.5-5                     



             7348691383)                                         

 

             CL (test code = 117 mmol/L          H            



             2721180063)                                         

 

             CO2 TOTAL (test code = 15 mmol/L    23-31        L            



             0571123368)                                         

 

             AGAP (test code =              2-16                      



             4789108759)                                         

 

             BUN (test code = 7 mg/dL      7-23                      



             5181522789)                                         

 

             GLUCOSE (test code = 204 mg/dL           H            



             8082587308)                                         

 

             CREATININE (test code = 0.41 mg/dL   0.6-1.25     L            



             9225318442)                                         

 

             CALCIUM (test code = 8.3 mg/dL    8.6-10.6     L            



             1383772781)                                         

 

             eGFR (test code =              mL/min/1.73m2              



             1945737584)                                         

 

             MAL (test code = MAL) Association of                           



                          Glomerular Filtration                           



                          Rate (GFR) and Staging                           



                          of Kidney Disease*                           



                          +---------------------                           



                          --+-------------------                           



                          --+-------------------                           



                          ------+| GFR                           



                          (mL/min/1.73 m2) ?|                           



                          With Kidney Damage ?|                           



                          ?Without Kidney                           



                          Damage+---------------                           



                          --------+-------------                           



                          --------+-------------                           



                          ------------+| ?>90 ?                           



                          ? ? ? ? ? ? ? ?|                           



                          ?Stage one ? ? ? ? ?|                           



                          ? Normal ? ? ? ? ? ? ?                           



                          ?+--------------------                           



                          ---+------------------                           



                          ---+------------------                           



                          -------+| ?60-89 ? ? ?                           



                          ? ? ? ? ?| ?Stage two                           



                          ? ? ? ? ?| ? Decreased                           



                          GFR ? ? ? ?                            



                          +---------------------                           



                          --+-------------------                           



                          --+-------------------                           



                          ------+| ?30-59 ? ? ?                           



                          ? ? ? ? ?| ?Stage                           



                          three ? ? ? ?| ? Stage                           



                          three ? ? ? ? ?                           



                          +---------------------                           



                          --+-------------------                           



                          --+-------------------                           



                          ------+| ?15-29 ? ? ?                           



                          ? ? ? ? ?| ?Stage four                           



                          ? ? ? ? | ? Stage four                           



                          ? ? ? ? ?                              



                          ?+--------------------                           



                          ---+------------------                           



                          ---+------------------                           



                          -------+| ?<15 (or                           



                          dialysis) ? ?| ?Stage                           



                          five ? ? ? ? | ? Stage                           



                          five ? ? ? ? ?                           



                          ?+--------------------                           



                          ---+------------------                           



                          ---+------------------                           



                          -------+ *Each stage                           



                          assumes the associated                           



                          GFR level has been in                           



                          effect for at least                           



                          three months. ?Stages                           



                          1 to 5, with or                           



                          without kidney                           



                          disease, indicate                           



                          chronic kidney                           



                          disease. Notes:                           



                          Determination of                           



                          stages one and two                           



                          (with eGFR                             



                          >59mL/min/1.73 m2)                           



                          requires estimation of                           



                          kidney damage for at                           



                          least three months as                           



                          defined by structural                           



                          or functional                           



                          abnormalities of the                           



                          kidney, manifested by                           



                          either:Pathological                           



                          abnormalities or                           



                          Markers of kidney                           



                          damage (including                           



                          abnormalities in the                           



                          composition of the                           



                          blood or urine or                           



                          abnormalities in                           



                          imaging tests).                           

 

             Lab Interpretation Abnormal                               



             (test code = 82981-4)                                        



[facility] Metabolic Panel (Na, K, Cl, CO2, 
Glucose, BUN, Creatinine, Ca)2022 16:02:27





             Test Item    Value        Reference Range Interpretation Comments

 

             NA (test code = 138 mmol/L   135-145                   



             6899610209)                                         

 

             K (test code = 3.7 mmol/L   3.5-5                     



             8456040360)                                         

 

             CL (test code = 117 mmol/L          H            



             0352443301)                                         

 

             CO2 TOTAL (test code = 15 mmol/L    23-31        L            



             0775300119)                                         

 

             AGAP (test code =              2-16                      



             6521107006)                                         

 

             BUN (test code = 7 mg/dL      7-23                      



             5732644482)                                         

 

             GLUCOSE (test code = 204 mg/dL           H            



             1312747368)                                         

 

             CREATININE (test code = 0.41 mg/dL   0.6-1.25     L            



             0298208916)                                         

 

             CALCIUM (test code = 8.3 mg/dL    8.6-10.6     L            



             2275246131)                                         

 

             eGFR (test code =              mL/min/1.73m2              



             6142989594)                                         

 

             MAL (test code = MAL) Association of                           



                          Glomerular Filtration                           



                          Rate (GFR) and Staging                           



                          of Kidney Disease*                           



                          +---------------------                           



                          --+-------------------                           



                          --+-------------------                           



                          ------+| GFR                           



                          (mL/min/1.73 m2) ?|                           



                          With Kidney Damage ?|                           



                          ?Without Kidney                           



                          Damage+---------------                           



                          --------+-------------                           



                          --------+-------------                           



                          ------------+| ?>90 ?                           



                          ? ? ? ? ? ? ? ?|                           



                          ?Stage one ? ? ? ? ?|                           



                          ? Normal ? ? ? ? ? ? ?                           



                          ?+--------------------                           



                          ---+------------------                           



                          ---+------------------                           



                          -------+| ?60-89 ? ? ?                           



                          ? ? ? ? ?| ?Stage two                           



                          ? ? ? ? ?| ? Decreased                           



                          GFR ? ? ? ?                            



                          +---------------------                           



                          --+-------------------                           



                          --+-------------------                           



                          ------+| ?30-59 ? ? ?                           



                          ? ? ? ? ?| ?Stage                           



                          three ? ? ? ?| ? Stage                           



                          three ? ? ? ? ?                           



                          +---------------------                           



                          --+-------------------                           



                          --+-------------------                           



                          ------+| ?15-29 ? ? ?                           



                          ? ? ? ? ?| ?Stage four                           



                          ? ? ? ? | ? Stage four                           



                          ? ? ? ? ?                              



                          ?+--------------------                           



                          ---+------------------                           



                          ---+------------------                           



                          -------+| ?<15 (or                           



                          dialysis) ? ?| ?Stage                           



                          five ? ? ? ? | ? Stage                           



                          five ? ? ? ? ?                           



                          ?+--------------------                           



                          ---+------------------                           



                          ---+------------------                           



                          -------+ *Each stage                           



                          assumes the associated                           



                          GFR level has been in                           



                          effect for at least                           



                          three months. ?Stages                           



                          1 to 5, with or                           



                          without kidney                           



                          disease, indicate                           



                          chronic kidney                           



                          disease. Notes:                           



                          Determination of                           



                          stages one and two                           



                          (with eGFR                             



                          >59mL/min/1.73 m2)                           



                          requires estimation of                           



                          kidney damage for at                           



                          least three months as                           



                          defined by structural                           



                          or functional                           



                          abnormalities of the                           



                          kidney, manifested by                           



                          either:Pathological                           



                          abnormalities or                           



                          Markers of kidney                           



                          damage (including                           



                          abnormalities in the                           



                          composition of the                           



                          blood or urine or                           



                          abnormalities in                           



                          imaging tests).                           

 

             Lab Interpretation Abnormal                               



             (test code = 23192-8)                                        



[facility] GLUCOSE (AUTOMATED)2022 15:54:35





             Test Item    Value        Reference Range Interpretation Comments

 

             POCT GLU (test code = 8757295451) 200 mg/dL           H      

      

 

             Lab Interpretation (test code = Abnormal                           

    



             81261-6)                                            



[facility] GLUCOSE (AUTOMATED)2022 15:54:35





             Test Item    Value        Reference Range Interpretation Comments

 

             POCT GLU (test code = 8161991105) 200 mg/dL           H      

      

 

             Lab Interpretation (test code = Abnormal                           

    



             85291-2)                                            



[facility] GLUCOSE (AUTOMATED)2022 14:57:17





             Test Item    Value        Reference Range Interpretation Comments

 

             POCT GLU (test code = 0440155306) 211 mg/dL           H      

      

 

             Lab Interpretation (test code = Abnormal                           

    



             18141-9)                                            



[facility] GLUCOSE (AUTOMATED)2022 14:57:17





             Test Item    Value        Reference Range Interpretation Comments

 

             POCT GLU (test code = 0444825963) 211 mg/dL           H      

      

 

             Lab Interpretation (test code = Abnormal                           

    



             08282-9)                                            



[facility] GLUCOSE (AUTOMATED)2022 13:50:06





             Test Item    Value        Reference Range Interpretation Comments

 

             POCT GLU (test code = 2728044352) 216 mg/dL           H      

      

 

             Lab Interpretation (test code = Abnormal                           

    



             37213-8)                                            



[facility] GLUCOSE (AUTOMATED)2022 13:50:06





             Test Item    Value        Reference Range Interpretation Comments

 

             POCT GLU (test code = 4111363794) 216 mg/dL           H      

      

 

             Lab Interpretation (test code = Abnormal                           

    



             36501-9)                                            



[facility] GLUCOSE (AUTOMATED)2022 10:43:13





             Test Item    Value        Reference Range Interpretation Comments

 

             POCT GLU (test code = 1757957473) 199 mg/dL           H      

      

 

             Lab Interpretation (test code = Abnormal                           

    



             90056-3)                                            



[facility] GLUCOSE (AUTOMATED)2022 10:43:13





             Test Item    Value        Reference Range Interpretation Comments

 

             POCT GLU (test code = 6023959368) 199 mg/dL           H      

      

 

             Lab Interpretation (test code = Abnormal                           

    



             46415-3)                                            



[facility] GLUCOSE (AUTOMATED)2022 09:31:59





             Test Item    Value        Reference Range Interpretation Comments

 

             POCT GLU (test code = 6017692901) 172 mg/dL           H      

      

 

             Lab Interpretation (test code = Abnormal                           

    



             11312-4)                                            



[facility] GLUCOSE (AUTOMATED)2022 09:31:59





             Test Item    Value        Reference Range Interpretation Comments

 

             POCT GLU (test code = 2186306535) 172 mg/dL           H      

      

 

             Lab Interpretation (test code = Abnormal                           

    



             90680-8)                                            



[facility] Metabolic Panel (Na, K, Cl, CO2, 
Glucose, BUN, Creatinine, Ca)2022 08:02:13





             Test Item    Value        Reference Range Interpretation Comments

 

             NA (test code = 140 mmol/L   135-145                   



             3734514076)                                         

 

             K (test code = 3.8 mmol/L   3.5-5                     



             0044840765)                                         

 

             CL (test code = 110 mmol/L          H            



             1778346737)                                         

 

             CO2 TOTAL (test code = 13 mmol/L    23-31        L            



             7746021900)                                         

 

             AGAP (test code =              2-16         H            



             [id number])                                         

 

             BUN (test code = 10 mg/dL     7-23                      



             [id number])                                         

 

             GLUCOSE (test code = 223 mg/dL           H            



             5515261354)                                         

 

             CREATININE (test code = 0.58 mg/dL   0.6-1.25     L            



             7715801741)                                         

 

             CALCIUM (test code = 8.8 mg/dL    8.6-10.6                  



             [id number])                                         

 

             eGFR (test code =              mL/min/1.73m2              



             0241626323)                                         

 

             MAL (test code = MAL) Association of                           



                          Glomerular Filtration                           



                          Rate (GFR) and Staging                           



                          of Kidney Disease*                           



                          +---------------------                           



                          --+-------------------                           



                          --+-------------------                           



                          ------+| GFR                           



                          (mL/min/1.73 m2) ?|                           



                          With Kidney Damage ?|                           



                          ?Without Kidney                           



                          Damage+---------------                           



                          --------+-------------                           



                          --------+-------------                           



                          ------------+| ?>90 ?                           



                          ? ? ? ? ? ? ? ?|                           



                          ?Stage one ? ? ? ? ?|                           



                          ? Normal ? ? ? ? ? ? ?                           



                          ?+--------------------                           



                          ---+------------------                           



                          ---+------------------                           



                          -------+| ?60-89 ? ? ?                           



                          ? ? ? ? ?| ?Stage two                           



                          ? ? ? ? ?| ? Decreased                           



                          GFR ? ? ? ?                            



                          +---------------------                           



                          --+-------------------                           



                          --+-------------------                           



                          ------+| ?30-59 ? ? ?                           



                          ? ? ? ? ?| ?Stage                           



                          three ? ? ? ?| ? Stage                           



                          three ? ? ? ? ?                           



                          +---------------------                           



                          --+-------------------                           



                          --+-------------------                           



                          ------+| ?15-29 ? ? ?                           



                          ? ? ? ? ?| ?Stage four                           



                          ? ? ? ? | ? Stage four                           



                          ? ? ? ? ?                              



                          ?+--------------------                           



                          ---+------------------                           



                          ---+------------------                           



                          -------+| ?<15 (or                           



                          dialysis) ? ?| ?Stage                           



                          five ? ? ? ? | ? Stage                           



                          five ? ? ? ? ?                           



                          ?+--------------------                           



                          ---+------------------                           



                          ---+------------------                           



                          -------+ *Each stage                           



                          assumes the associated                           



                          GFR level has been in                           



                          effect for at least                           



                          three months. ?Stages                           



                          1 to 5, with or                           



                          without kidney                           



                          disease, indicate                           



                          chronic kidney                           



                          disease. Notes:                           



                          Determination of                           



                          stages one and two                           



                          (with eGFR                             



                          >59mL/min/1.73 m2)                           



                          requires estimation of                           



                          kidney damage for at                           



                          least three months as                           



                          defined by structural                           



                          or functional                           



                          abnormalities of the                           



                          kidney, manifested by                           



                          either:Pathological                           



                          abnormalities or                           



                          Markers of kidney                           



                          damage (including                           



                          abnormalities in the                           



                          composition of the                           



                          blood or urine or                           



                          abnormalities in                           



                          imaging tests).                           

 

             Lab Interpretation Abnormal                               



             (test code = 12193-8)                                        



[facility] Metabolic Panel (Na, K, Cl, CO2, 
Glucose, BUN, Creatinine, Ca)2022 08:02:13





             Test Item    Value        Reference Range Interpretation Comments

 

             NA (test code = 140 mmol/L   135-145                   



             [id number])                                         

 

             K (test code = 3.8 mmol/L   3.5-5                     



             6377463685)                                         

 

             CL (test code = 110 mmol/L          H            



             0934322770)                                         

 

             CO2 TOTAL (test code = 13 mmol/L    23-31        L            



             0738350235)                                         

 

             AGAP (test code =              2-16         H            



             3501114042)                                         

 

             BUN (test code = 10 mg/dL     7-23                      



             7599049845)                                         

 

             GLUCOSE (test code = 223 mg/dL           H            



             3295522330)                                         

 

             CREATININE (test code = 0.58 mg/dL   0.6-1.25     L            



             0786369949)                                         

 

             CALCIUM (test code = 8.8 mg/dL    8.6-10.6                  



             5111388201)                                         

 

             eGFR (test code =              mL/min/1.73m2              



             8832386474)                                         

 

             MAL (test code = MAL) Association of                           



                          Glomerular Filtration                           



                          Rate (GFR) and Staging                           



                          of Kidney Disease*                           



                          +---------------------                           



                          --+-------------------                           



                          --+-------------------                           



                          ------+| GFR                           



                          (mL/min/1.73 m2) ?|                           



                          With Kidney Damage ?|                           



                          ?Without Kidney                           



                          Damage+---------------                           



                          --------+-------------                           



                          --------+-------------                           



                          ------------+| ?>90 ?                           



                          ? ? ? ? ? ? ? ?|                           



                          ?Stage one ? ? ? ? ?|                           



                          ? Normal ? ? ? ? ? ? ?                           



                          ?+--------------------                           



                          ---+------------------                           



                          ---+------------------                           



                          -------+| ?60-89 ? ? ?                           



                          ? ? ? ? ?| ?Stage two                           



                          ? ? ? ? ?| ? Decreased                           



                          GFR ? ? ? ?                            



                          +---------------------                           



                          --+-------------------                           



                          --+-------------------                           



                          ------+| ?30-59 ? ? ?                           



                          ? ? ? ? ?| ?Stage                           



                          three ? ? ? ?| ? Stage                           



                          three ? ? ? ? ?                           



                          +---------------------                           



                          --+-------------------                           



                          --+-------------------                           



                          ------+| ?15-29 ? ? ?                           



                          ? ? ? ? ?| ?Stage four                           



                          ? ? ? ? | ? Stage four                           



                          ? ? ? ? ?                              



                          ?+--------------------                           



                          ---+------------------                           



                          ---+------------------                           



                          -------+| ?<15 (or                           



                          dialysis) ? ?| ?Stage                           



                          five ? ? ? ? | ? Stage                           



                          five ? ? ? ? ?                           



                          ?+--------------------                           



                          ---+------------------                           



                          ---+------------------                           



                          -------+ *Each stage                           



                          assumes the associated                           



                          GFR level has been in                           



                          effect for at least                           



                          three months. ?Stages                           



                          1 to 5, with or                           



                          without kidney                           



                          disease, indicate                           



                          chronic kidney                           



                          disease. Notes:                           



                          Determination of                           



                          stages one and two                           



                          (with eGFR                             



                          >59mL/min/1.73 m2)                           



                          requires estimation of                           



                          kidney damage for at                           



                          least three months as                           



                          defined by structural                           



                          or functional                           



                          abnormalities of the                           



                          kidney, manifested by                           



                          either:Pathological                           



                          abnormalities or                           



                          Markers of kidney                           



                          damage (including                           



                          abnormalities in the                           



                          composition of the                           



                          blood or urine or                           



                          abnormalities in                           



                          imaging tests).                           

 

             Lab Interpretation Abnormal                               



             (test code = 24452-8)                                        



[facility]





             Test Item    Value        Reference Range Interpretation Comments

 

             POCT GLU (test code = 0713327271) 245 mg/dL           H      

      

 

             Lab Interpretation (test code = Abnormal                           

    



             38427-4)                                            



[facility] GLUCOSE (AUTOMATED)2022 07:26:01





             Test Item    Value        Reference Range Interpretation Comments

 

             POCT GLU (test code = 9810028455) 245 mg/dL           H      

      

 

             Lab Interpretation (test code = Abnormal                           

    



             77537-9)                                            



[facility] GLUCOSE(AGE &gt;30DAYS)2022 
07:11:00





             Test Item    Value        Reference Range Interpretation Comments

 

             POCT Glu (age>30days) (test code = 245 mg/dL                 

       



             3342)                                               

 

             Lab Interpretation (test code = Normal                             

    



             99438-0)                                            



[facility] GLUCOSE(AGE &gt;30DAYS)2022 
07:11:00





             Test Item    Value        Reference Range Interpretation Comments

 

             POCT Glu (age>30days) (test code = 245 mg/dL                 

       



             3342)                                               

 

             Lab Interpretation (test code = Normal                             

    



             52592-3)                                            



[facility] GLUCOSE(AGE &gt;30DAYS)2022 
07:11:00





             Test Item    Value        Reference Range Interpretation Comments

 

             POCT Glu (age>30days) (test code = 245 mg/dL                 

       



             3342)                                               

 

             Lab Interpretation (test code = Normal                             

    



             75946-0)                                            



[facility] 
[facility]57[facility]25





             Test Item    Value        Reference Range Interpretation Comments

 

             HGB A1C (test code = 12.7 %       4-5.7        H            



             4548-4)                                             

 

             MAL (test code = MAL) Reference                              



                          RangesNormal:                           



                          <5.7%Prediabetes:                           



                          5.7 - 6.4%Diabetes:                           



                          > 6.5%                                 

 

             Lab Interpretation (test Abnormal                               



             code = 03589-3)                                        



[facility] 
[facility]57:25





             Test Item    Value        Reference Range Interpretation Comments

 

             HGB A1C (test code = 12.7 %       4-5.7        H            



             4548-4)                                             

 

             MAL (test code = MAL) Reference                              



                          RangesNormal:                           



                          <5.7%Prediabetes:                           



                          5.7 - 6.4%Diabetes:                           



                          > 6.5%                                 

 

             Lab Interpretation (test Abnormal                               



             code = 59276-4)                                        



[facility] 
[facility]:57:25





             Test Item    Value        Reference Range Interpretation Comments

 

             HGB A1C (test code = 12.7 %       4-5.7        H            



             4548-4)                                             

 

             MAL (test code = MAL) Reference                              



                          RangesNormal:                           



                          <5.7%Prediabetes:                           



                          5.7 - 6.4%Diabetes:                           



                          > 6.5%                                 

 

             Lab Interpretation (test Abnormal                               



             code = 49850-3)                                        



[facility] [id number] 05:46:44





             Test Item    Value        Reference Range Interpretation Comments

 

             MAGNESIUM (test code = 0231988268) 1.7 mg/dL    1.7-2.4            

       

 

             Lab Interpretation (test code = Normal                             

    



             70272-2)                                            



[facility] [id number] 05:46:44





             Test Item    Value        Reference Range Interpretation Comments

 

             MAGNESIUM (test code = 4903324424) 1.7 mg/dL    1.7-2.4            

       

 

             Lab Interpretation (test code = Normal                             

    



             03602-6)                                            



[facility] [id number] 05:46:24





             Test Item    Value        Reference Range Interpretation Comments

 

             PHOSPHORUS (test code = 3926540917) 4.0 mg/dL    2.5-5             

        

 

             Lab Interpretation (test code = Normal                             

    



             77184-5)                                            



[facility] [id number] 05:46:24





             Test Item    Value        Reference Range Interpretation Comments

 

             PHOSPHORUS (test code = 8361553741) 4.0 mg/dL    2.5-5             

        

 

             Lab Interpretation (test code = Normal                             

    



             13385-2)                                            



[facility] [id number] 05:46:24





             Test Item    Value        Reference Range Interpretation Comments

 

             PHOSPHORUS (test code = 7897165502) 4.0 mg/dL    2.5-5             

        

 

             Lab Interpretation (test code = Normal                             

    



             81204-4)                                            



[facility] GLUCOSE (AUTOMATED)2022 05:11:10





             Test Item    Value        Reference Range Interpretation Comments

 

             POCT GLU (test code = 0241983632) 410 mg/dL           H      

      

 

             Lab Interpretation (test code = Abnormal                           

    



             89932-6)                                            



[facility] GLUCOSE (AUTOMATED)2022 05:11:10





             Test Item    Value        Reference Range Interpretation Comments

 

             POCT GLU (test code = 5992262392) 410 mg/dL           H      

      

 

             Lab Interpretation (test code = Abnormal                           

    



             18813-2)                                            



[facility] GLUCOSE(AGE &gt;30DAYS)2022 
05:11:00





             Test Item    Value        Reference Range Interpretation Comments

 

             POCT Glu (age>30days) (test code = 410 mg/dL           A     

       



             3342)                                               

 

             Lab Interpretation (test code = Abnormal                           

    



             11476-6)                                            



[facility] GLUCOSE(AGE &gt;30DAYS)2022 
05:11:00





             Test Item    Value        Reference Range Interpretation Comments

 

             POCT Glu (age>30days) (test code = 410 mg/dL           A     

       



             3342)                                               

 

             Lab Interpretation (test code = Abnormal                           

    



             63888-3)                                            



[facility] WITH [id number] 05:03:57





             Test Item    Value        Reference Range Interpretation Comments

 

             WBC (test code =              See_Comment                [Automated



             2390-2)                                             message] The sy

stem



                                                                 which generated



                                                                 this result



                                                                 transmitted



                                                                 reference range

:



                                                                 4.20 - 10.70



                                                                 10*3/?L. The



                                                                 reference range

 was



                                                                 not used to



                                                                 interpret this



                                                                 result as



                                                                 normal/abnormal

.

 

             RBC (test code =              See_Comment                [Automated



             701-8)                                              message] The sy

stem



                                                                 which generated



                                                                 this result



                                                                 transmitted



                                                                 reference range

:



                                                                 4.26 - 5.52



                                                                 10*6/?L. The



                                                                 reference range

 was



                                                                 not used to



                                                                 interpret this



                                                                 result as



                                                                 normal/abnormal

.

 

             HGB (test code = 13.9 g/dL    12.2-16.4                 



             718-7)                                              

 

             HCT (test code = 42.6 %       38.4-49.3                 



             4544-3)                                             

 

             MCV (test code = 88.2 fL      81.7-95.6                 



             787-2)                                              

 

             MCH (test code = 28.8 pg      26.1-32.7                 



             785-6)                                              

 

             MCHC (test code = 32.6 g/dL    31.2-35                   



             786-4)                                              

 

             RDW-SD (test code = 44.1 fL      38.5-51.6                 



             31058-6)                                            

 

             RDW-CV (test code = 13.8 %       12.1-15.4                 



             788-0)                                              

 

             PLT (test code =              See_Comment  H             [Automated



             887-3)                                              message] The sy

stem



                                                                 which generated



                                                                 this result



                                                                 transmitted



                                                                 reference range

:



                                                                 150 - 328 10*3/

?L.



                                                                 The reference r

zhen



                                                                 was not used to



                                                                 interpret this



                                                                 result as



                                                                 normal/abnormal

.

 

             MPV (test code = 12.2 fL      9.8-13                    



             27522-5)                                            

 

             IPF % (test code = 9.4 %        1.2-10.7                  Platelet 

count



             [id number])                                         measured by



                                                                 fluorescence



                                                                 method.

 

             NRBC/100 WBC (test              See_Comment                [Automat

ed



             code = 5482076756)                                        message] 

The system



                                                                 which generated



                                                                 this result



                                                                 transmitted



                                                                 reference range

:



                                                                 0.0 - 10.0 /100



                                                                 WBCs. The refer

ence



                                                                 range was not u

sed



                                                                 to interpret th

is



                                                                 result as



                                                                 normal/abnormal

.

 

             NRBC x10^3 (test code              See_Comment                [Auto

mated



             = [id number])                                        message] The s

ystem



                                                                 which generated



                                                                 this result



                                                                 transmitted



                                                                 reference range

:



                                                                 10*3/?L. The



                                                                 reference range

 was



                                                                 not used to



                                                                 interpret this



                                                                 result as



                                                                 normal/abnormal

.

 

             GRAN MAT (NEUT) % 65.1 %                                 



             (test code = 770-8)                                        

 

             IMM GRAN % (test code 1.10 %                                 



             = 9251937968)                                        

 

             LYMPH % (test code = 22.4 %                                 



             736-9)                                              

 

             MONO % (test code = 9.8 %                                  



             5905-5)                                             

 

             EOS % (test code = 1.4 %                                  



             713-8)                                              

 

             BASO % (test code = 0.2 %                                  



             706-2)                                              

 

             GRAN MAT x10^3(ANC) 6.15 10*3/uL 1.99-6.95                 



             (test code =                                        



             9230312089)                                         

 

             IMM GRAN x10^3 (test 0.10 10*3/uL 0-0.06       H            



             code = [id number])                                        

 

             LYMPH x10^3 (test code 2.11 10*3/uL 1.09-3.23                 



             = 731-0)                                            

 

             MONO x10^3 (test code 0.92 10*3/uL 0.36-1.02                 



             = 742-7)                                            

 

             EOS x10^3 (test code = 0.13 10*3/uL 0.06-0.53                 



             711-2)                                              

 

             BASO x10^3 (test code              0.01-0.09                 



             = 704-7)                                            

 

             Lab Interpretation Abnormal                               



             (test code = 80409-9)                                        



[facility] WITH [id number] 05:03:57





             Test Item    Value        Reference Range Interpretation Comments

 

             WBC (test code =              See_Comment                [Automated



             6690-2)                                             message] The sy

stem



                                                                 which generated



                                                                 this result



                                                                 transmitted



                                                                 reference range

:



                                                                 4.20 - 10.70



                                                                 10*3/?L. The



                                                                 reference range

 was



                                                                 not used to



                                                                 interpret this



                                                                 result as



                                                                 normal/abnormal

.

 

             RBC (test code =              See_Comment                [Automated



             789-8)                                              message] The sy

stem



                                                                 which generated



                                                                 this result



                                                                 transmitted



                                                                 reference range

:



                                                                 4.26 - 5.52



                                                                 10*6/?L. The



                                                                 reference range

 was



                                                                 not used to



                                                                 interpret this



                                                                 result as



                                                                 normal/abnormal

.

 

             HGB (test code = 13.9 g/dL    12.2-16.4                 



             718-7)                                              

 

             HCT (test code = 42.6 %       38.4-49.3                 



             4544-3)                                             

 

             MCV (test code = 88.2 fL      81.7-95.6                 



             787-2)                                              

 

             MCH (test code = 28.8 pg      26.1-32.7                 



             785-6)                                              

 

             MCHC (test code = 32.6 g/dL    31.2-35                   



             786-4)                                              

 

             RDW-SD (test code = 44.1 fL      38.5-51.6                 



             20312-4)                                            

 

             RDW-CV (test code = 13.8 %       12.1-15.4                 



             788-0)                                              

 

             PLT (test code =              See_Comment  H             [Automated



             777-3)                                              message] The sy

stem



                                                                 which generated



                                                                 this result



                                                                 transmitted



                                                                 reference range

:



                                                                 150 - 328 10*3/

?L.



                                                                 The reference r

zhen



                                                                 was not used to



                                                                 interpret this



                                                                 result as



                                                                 normal/abnormal

.

 

             MPV (test code = 12.2 fL      9.8-13                    



             19981-8)                                            

 

             IPF % (test code = 9.4 %        1.2-10.7                  Platelet 

count



             [id number])                                         measured by



                                                                 fluorescence



                                                                 method.

 

             NRBC/100 WBC (test              See_Comment                [Automat

ed



             code = 3106855394)                                        message] 

The system



                                                                 which generated



                                                                 this result



                                                                 transmitted



                                                                 reference range

:



                                                                 0.0 - 10.0 /100



                                                                 WBCs. The refer

ence



                                                                 range was not u

sed



                                                                 to interpret th

is



                                                                 result as



                                                                 normal/abnormal

.

 

             NRBC x10^3 (test code              See_Comment                [Auto

mated



             = [id number])                                        message] The s

ystem



                                                                 which generated



                                                                 this result



                                                                 transmitted



                                                                 reference range

:



                                                                 10*3/?L. The



                                                                 reference range

 was



                                                                 not used to



                                                                 interpret this



                                                                 result as



                                                                 normal/abnormal

.

 

             GRAN MAT (NEUT) % 65.1 %                                 



             (test code = 770-8)                                        

 

             IMM GRAN % (test code 1.10 %                                 



             = 4910425737)                                        

 

             LYMPH % (test code = 22.4 %                                 



             736-9)                                              

 

             MONO % (test code = 9.8 %                                  



             5905-5)                                             

 

             EOS % (test code = 1.4 %                                  



             713-8)                                              

 

             BASO % (test code = 0.2 %                                  



             706-2)                                              

 

             GRAN MAT x10^3(ANC) 6.15 10*3/uL 1.99-6.95                 



             (test code =                                        



             6652120020)                                         

 

             IMM GRAN x10^3 (test 0.10 10*3/uL 0-0.06       H            



             code = 2266544682)                                        

 

             LYMPH x10^3 (test code 2.11 10*3/uL 1.09-3.23                 



             = 731-0)                                            

 

             MONO x10^3 (test code 0.92 10*3/uL 0.36-1.02                 



             = 742-7)                                            

 

             EOS x10^3 (test code = 0.13 10*3/uL 0.06-0.53                 



             711-2)                                              

 

             BASO x10^3 (test code              0.01-0.09                 



             = 704-7)                                            

 

             Lab Interpretation Abnormal                               



             (test code = 47054-9)                                        



[facility] METABOLIC PANEL (NA, K, CL, CO2, 
GLUCOSE, BUN, CREATININE, CA)2022 04:46:12





             Test Item    Value        Reference Range Interpretation Comments

 

             NA (test code = 136 mmol/L   135-145                   



             2949028967)                                         

 

             K (test code = 5.1 mmol/L   3.5-5        H            



             3072411019)                                         

 

             CL (test code = 106 mmol/L                       



             4035744765)                                         

 

             CO2 TOTAL (test code = 9 mmol/L     23-31        L            



             9521938441)                                         

 

             AGAP (test code =              2-16         H            



             3519234792)                                         

 

             BUN (test code = 11 mg/dL     7-23                      



             6925667563)                                         

 

             GLUCOSE (test code = 405 mg/dL           H            



             9403431415)                                         

 

             CREATININE (test code = 0.62 mg/dL   0.6-1.25                  



             5387301403)                                         

 

             CALCIUM (test code = 9.5 mg/dL    8.6-10.6                  



             8282967343)                                         

 

             eGFR (test code =              mL/min/1.73m2              



             3399588109)                                         

 

             MAL (test code = MAL) Association of                           



                          Glomerular Filtration                           



                          Rate (GFR) and Staging                           



                          of Kidney Disease*                           



                          +---------------------                           



                          --+-------------------                           



                          --+-------------------                           



                          ------+| GFR                           



                          (mL/min/1.73 m2) ?|                           



                          With Kidney Damage ?|                           



                          ?Without Kidney                           



                          Damage+---------------                           



                          --------+-------------                           



                          --------+-------------                           



                          ------------+| ?>90 ?                           



                          ? ? ? ? ? ? ? ?|                           



                          ?Stage one ? ? ? ? ?|                           



                          ? Normal ? ? ? ? ? ? ?                           



                          ?+--------------------                           



                          ---+------------------                           



                          ---+------------------                           



                          -------+| ?60-89 ? ? ?                           



                          ? ? ? ? ?| ?Stage two                           



                          ? ? ? ? ?| ? Decreased                           



                          GFR ? ? ? ?                            



                          +---------------------                           



                          --+-------------------                           



                          --+-------------------                           



                          ------+| ?30-59 ? ? ?                           



                          ? ? ? ? ?| ?Stage                           



                          three ? ? ? ?| ? Stage                           



                          three ? ? ? ? ?                           



                          +---------------------                           



                          --+-------------------                           



                          --+-------------------                           



                          ------+| ?15-29 ? ? ?                           



                          ? ? ? ? ?| ?Stage four                           



                          ? ? ? ? | ? Stage four                           



                          ? ? ? ? ?                              



                          ?+--------------------                           



                          ---+------------------                           



                          ---+------------------                           



                          -------+| ?<15 (or                           



                          dialysis) ? ?| ?Stage                           



                          five ? ? ? ? | ? Stage                           



                          five ? ? ? ? ?                           



                          ?+--------------------                           



                          ---+------------------                           



                          ---+------------------                           



                          -------+ *Each stage                           



                          assumes the associated                           



                          GFR level has been in                           



                          effect for at least                           



                          three months. ?Stages                           



                          1 to 5, with or                           



                          without kidney                           



                          disease, indicate                           



                          chronic kidney                           



                          disease. Notes:                           



                          Determination of                           



                          stages one and two                           



                          (with eGFR                             



                          >59mL/min/1.73 m2)                           



                          requires estimation of                           



                          kidney damage for at                           



                          least three months as                           



                          defined by structural                           



                          or functional                           



                          abnormalities of the                           



                          kidney, manifested by                           



                          either:Pathological                           



                          abnormalities or                           



                          Markers of kidney                           



                          damage (including                           



                          abnormalities in the                           



                          composition of the                           



                          blood or urine or                           



                          abnormalities in                           



                          imaging tests).                           

 

             Lab Interpretation Abnormal                               



             (test code = 74634-0)                                        



[facility] METABOLIC PANEL (NA, K, CL, CO2, 
GLUCOSE, BUN, CREATININE, CA)2022 04:46:12





             Test Item    Value        Reference Range Interpretation Comments

 

             NA (test code = 136 mmol/L   135-145                   



             [id number])                                         

 

             K (test code = 5.1 mmol/L   3.5-5        H            



             1131719216)                                         

 

             CL (test code = 106 mmol/L                       



             1216055184)                                         

 

             CO2 TOTAL (test code = 9 mmol/L     23-31        L            



             7839797254)                                         

 

             AGAP (test code =              2-16         H            



             2426143672)                                         

 

             BUN (test code = 11 mg/dL     7-23                      



             8896219621)                                         

 

             GLUCOSE (test code = 405 mg/dL           H            



             5183451375)                                         

 

             CREATININE (test code = 0.62 mg/dL   0.6-1.25                  



             5688915502)                                         

 

             CALCIUM (test code = 9.5 mg/dL    8.6-10.6                  



             7213190723)                                         

 

             eGFR (test code =              mL/min/1.73m2              



             8620803191)                                         

 

             MAL (test code = MAL) Association of                           



                          Glomerular Filtration                           



                          Rate (GFR) and Staging                           



                          of Kidney Disease*                           



                          +---------------------                           



                          --+-------------------                           



                          --+-------------------                           



                          ------+| GFR                           



                          (mL/min/1.73 m2) ?|                           



                          With Kidney Damage ?|                           



                          ?Without Kidney                           



                          Damage+---------------                           



                          --------+-------------                           



                          --------+-------------                           



                          ------------+| ?>90 ?                           



                          ? ? ? ? ? ? ? ?|                           



                          ?Stage one ? ? ? ? ?|                           



                          ? Normal ? ? ? ? ? ? ?                           



                          ?+--------------------                           



                          ---+------------------                           



                          ---+------------------                           



                          -------+| ?60-89 ? ? ?                           



                          ? ? ? ? ?| ?Stage two                           



                          ? ? ? ? ?| ? Decreased                           



                          GFR ? ? ? ?                            



                          +---------------------                           



                          --+-------------------                           



                          --+-------------------                           



                          ------+| ?30-59 ? ? ?                           



                          ? ? ? ? ?| ?Stage                           



                          three ? ? ? ?| ? Stage                           



                          three ? ? ? ? ?                           



                          +---------------------                           



                          --+-------------------                           



                          --+-------------------                           



                          ------+| ?15-29 ? ? ?                           



                          ? ? ? ? ?| ?Stage four                           



                          ? ? ? ? | ? Stage four                           



                          ? ? ? ? ?                              



                          ?+--------------------                           



                          ---+------------------                           



                          ---+------------------                           



                          -------+| ?<15 (or                           



                          dialysis) ? ?| ?Stage                           



                          five ? ? ? ? | ? Stage                           



                          five ? ? ? ? ?                           



                          ?+--------------------                           



                          ---+------------------                           



                          ---+------------------                           



                          -------+ *Each stage                           



                          assumes the associated                           



                          GFR level has been in                           



                          effect for at least                           



                          three months. ?Stages                           



                          1 to 5, with or                           



                          without kidney                           



                          disease, indicate                           



                          chronic kidney                           



                          disease. Notes:                           



                          Determination of                           



                          stages one and two                           



                          (with eGFR                             



                          >59mL/min/1.73 m2)                           



                          requires estimation of                           



                          kidney damage for at                           



                          least three months as                           



                          defined by structural                           



                          or functional                           



                          abnormalities of the                           



                          kidney, manifested by                           



                          either:Pathological                           



                          abnormalities or                           



                          Markers of kidney                           



                          damage (including                           



                          abnormalities in the                           



                          composition of the                           



                          blood or urine or                           



                          abnormalities in                           



                          imaging tests).                           

 

             Lab Interpretation Abnormal                               



             (test code = 35113-4)                                        



[facility] [id number] [facility]43:36





             Test Item    Value        Reference Range Interpretation Comments

 

             PROTIME PATIENT (test              See_Comment                [Auto

mated message]



             code = 5964-2)                                        The system [employer]



                                                                 generated this 

result



                                                                 transmitted ref

erence



                                                                 range: 12.0 - 1

4.7



                                                                 Seconds. The re

ference



                                                                 range was not u

sed to



                                                                 interpret this 

result



                                                                 as normal/abnor

mal.

 

             INR (test code = 6301-6)                                        Nor

mal INR <1.1;



                                                                 Warfarin Therap

eutic



                                                                 range 2.0 to 3.

0 or



                                                                 2.5 to 3.5, dep

ending



                                                                 upon the indica

tions.

 

             Lab Interpretation (test Normal                                 



             code = 60535-9)                                        



[facility] [id number] 04:43:36





             Test Item    Value        Reference Range Interpretation Comments

 

             PROTIME PATIENT (test              See_Comment                [Auto

mated message]



             code = 5964-2)                                        The system 

[employer]



                                                                 generated this 

result



                                                                 transmitted ref

erence



                                                                 range: 12.0 - 1

4.7



                                                                 Seconds. The re

ference



                                                                 range was not u

sed to



                                                                 interpret this 

result



                                                                 as normal/abnor

mal.

 

             INR (test code = 6301-6)                                        Nor

mal INR <1.1;



                                                                 Warfarin Therap

eutic



                                                                 range 2.0 to 3.

0 or



                                                                 2.5 to 3.5, dep

ending



                                                                 upon the indica

tions.

 

             Lab Interpretation (test Normal                                 



             code = 54983-8)                                        



[facility] [id number] 04:43:36





             Test Item    Value        Reference Range Interpretation Comments

 

             PROTIME PATIENT (test              See_Comment                [Auto

mated message]



             code = 5964-2)                                        The system 

[employer]



                                                                 generated this 

result



                                                                 transmitted ref

erence



                                                                 range: 12.0 - 1

4.7



                                                                 Seconds. The re

ference



                                                                 range was not u

sed to



                                                                 interpret this 

result



                                                                 as normal/abnor

mal.

 

             INR (test code = 6301-6)                                        Nor

mal INR <1.1;



                                                                 Warfarin Therap

eutic



                                                                 range 2.0 to 3.

0 or



                                                                 2.5 to 3.5, dep

ending



                                                                 upon the indica

tions.

 

             Lab Interpretation (test Normal                                 



             code = 56078-0)                                        



[facility] (ALB,T.PRO,BILI
T,BU/BC,ALT,AST,ALK PHOS)2022 04:40:15





             Test Item    Value        Reference Range Interpretation Comments

 

             TOTAL BILI (test code = 3753569579) 0.9 mg/dL    0.1-1.1           

        

 

             BILI UNCON (test code = 0905102715) 0.3 mg/dL    0.1-1.1           

        

 

             BILI CONJ (test code = 4176018083) 0.0 mg/dL    0-0.3              

       

 

             T PROTEIN (test code = 6399092546) 7.5 g/dL     6.3-8.2            

       

 

             ALBUMIN (test code = 6924798519) 4.0 g/dL     3.5-5                

     

 

             ALK PHOS (test code = 0603764360) 166 U/L             H      

      

 

             ALTv (test code = 1742-6) 28 U/L       5-50                      

 

             AST(SGOT) (test code = 3652025776) 22 U/L       13-40              

       

 

             Lab Interpretation (test code = Abnormal                           

    



             00441-0)                                            



[facility]396[facility] (ALB,T.PRO,BILI
T,BU/BC,ALT,AST,ALK PHOS)2022 04:40:15





             Test Item    Value        Reference Range Interpretation Comments

 

             TOTAL BILI (test code = 2497586182) 0.9 mg/dL    0.1-1.1           

        

 

             BILI UNCON (test code = 4058572929) 0.3 mg/dL    0.1-1.1           

        

 

             BILI CONJ (test code = 4881052418) 0.0 mg/dL    0-0.3              

       

 

             T PROTEIN (test code = 6947264309) 7.5 g/dL     6.3-8.2            

       

 

             ALBUMIN (test code = 6042573277) 4.0 g/dL     3.5-5                

     

 

             ALK PHOS (test code = 0215076929) 166 U/L             H      

      

 

             ALTv (test code = 1742-6) 28 U/L       5-50                      

 

             AST(SGOT) (test code = 3092779807) 22 U/L       13-40              

       

 

             Lab Interpretation (test code = Abnormal                           

    



             34013-0)                                            



[facility]230[facility] (ALB,T.PRO,BILI
T,BU/BC,ALT,AST,ALK PHOS)2022 04:40:15





             Test Item    Value        Reference Range Interpretation Comments

 

             TOTAL BILI (test code = 4856596204) 0.9 mg/dL    0.1-1.1           

        

 

             BILI UNCON (test code = 5156049687) 0.3 mg/dL    0.1-1.1           

        

 

             BILI CONJ (test code = 5664769712) 0.0 mg/dL    0-0.3              

       

 

             T PROTEIN (test code = 2331805273) 7.5 g/dL     6.3-8.2            

       

 

             ALBUMIN (test code = 7830028297) 4.0 g/dL     3.5-5                

     

 

             ALK PHOS (test code = 8329223356) 166 U/L             H      

      

 

             ALTv (test code = 1742-6) 28 U/L       5-50                      

 

             AST(SGOT) (test code = 4581405268) 22 U/L       13-40              

       

 

             Lab Interpretation (test code = Abnormal                           

    



             26809-1)                                            



[facility]





             Test Item    Value        Reference Range Interpretation Comments

 

             LIPASE (test code = 1693896818) 239 U/L      0-220        H        

    

 

             Lab Interpretation (test code = Abnormal                           

    



             39200-1)                                            



[facility]





             Test Item    Value        Reference Range Interpretation Comments

 

             LIPASE (test code = 5887854855) 239 U/L      0-220        H        

    

 

             Lab Interpretation (test code = Abnormal                           

    



             76866-2)                                            



[facility]





             Test Item    Value        Reference Range Interpretation Comments

 

             LIPASE (test code = 6108679000) 239 U/L      0-220        H        

    

 

             Lab Interpretation (test code = Abnormal                           

    



             40963-2)                                            



[id number]:01:26





             Test Item    Value        Reference Range Interpretation Comments

 

             Blood Culture-Aerobic No organisms No growth                 Previo

us



             (test code = 17928-3) isolated                               prelim

inary



                                                                 verified result



                                                                 was Culture In



                                                                 Progress on



                                                                 2022 at 00

02



                                                                 CDTPrevious



                                                                 preliminary



                                                                 verified result



                                                                 was No growth a

t



                                                                 24 hours on



                                                                 2022 at 21

01



                                                                 CDTPrevious



                                                                 preliminary



                                                                 verified result



                                                                 was No growth a

t



                                                                 48 hours on



                                                                 2022 at 21

01



                                                                 CDTPrevious



                                                                 preliminary



                                                                 verified result



                                                                 was No growth a

t



                                                                 72 hours on



                                                                 2022 at 21

01



                                                                 CDT

 

             Blood        No organisms No growth                 Previous



             Culture-Anaerobic isolated                               preliminar

y



             (test code = 93954-4)                                        verifi

ed result



                                                                 was Culture In



                                                                 Progress on



                                                                 2022 at 00

02



                                                                 CDTPrevious



                                                                 preliminary



                                                                 verified result



                                                                 was No growth a

t



                                                                 24 hours on



                                                                 2022 at 21

01



                                                                 CDTPrevious



                                                                 preliminary



                                                                 verified result



                                                                 was No growth a

t



                                                                 48 hours on



                                                                 2022 at 21

01



                                                                 CDTPrevious



                                                                 preliminary



                                                                 verified result



                                                                 was No growth a

t



                                                                 72 hours on



                                                                 2022 at 21

01



                                                                 CDT

 

             Lab Interpretation Normal                                 



             (test code = 88410-4)                                        



[facility] GLUCOSE (AUTOMATED)2022 19:39:16





             Test Item    Value        Reference Range Interpretation Comments

 

             POCT GLU (test code = 2281513386) 153 mg/dL           H      

      

 

             Lab Interpretation (test code = Abnormal                           

    



             75232-7)                                            



[facility] GLUCOSE (AUTOMATED)2022 16:42:59





             Test Item    Value        Reference Range Interpretation Comments

 

             POCT GLU (test code = 2923075805) 311 mg/dL           H      

      

 

             Lab Interpretation (test code = Abnormal                           

    



             07217-0)                                            



[facility] GLUCOSE (AUTOMATED)2022 01:48:17





             Test Item    Value        Reference Range Interpretation Comments

 

             POCT GLU (test code = 7290356326) 253 mg/dL           H      

      

 

             Lab Interpretation (test code = Abnormal                           

    



             12640-6)                                            



[facility] GLUCOSE (AUTOMATED)2022 21:48:07





             Test Item    Value        Reference Range Interpretation Comments

 

             POCT GLU (test code = 1341101113) 279 mg/dL           H      

      

 

             Lab Interpretation (test code = Abnormal                           

    



             02592-6)                                            



[facility] GLUCOSE (AUTOMATED)2022 16:48:09





             Test Item    Value        Reference Range Interpretation Comments

 

             POCT GLU (test code = 2971161063) 275 mg/dL           H      

      

 

             Lab Interpretation (test code = Abnormal                           

    



             43482-9)                                            



[facility] METABOLIC PANEL (NA, K, CL, CO2, 
GLUCOSE, BUN, CREATININE, CA)2022 15:41:54





             Test Item    Value        Reference Range Interpretation Comments

 

             NA (test code = 148 mmol/L   135-145      H            



             5290224842)                                         

 

             K (test code = 4.3 mmol/L   3.5-5                     



             7535155730)                                         

 

             CL (test code = 123 mmol/L          H            



             9084870742)                                         

 

             CO2 TOTAL (test code = 19 mmol/L    23-31        L            



             1326127750)                                         

 

             AGAP (test code =              2-16                      



             4535896654)                                         

 

             BUN (test code = 23 mg/dL     7-23                      



             8020156337)                                         

 

             GLUCOSE (test code = 305 mg/dL           H            



             8948999190)                                         

 

             CREATININE (test code = 0.61 mg/dL   0.6-1.25                  



             0251445475)                                         

 

             CALCIUM (test code = 8.5 mg/dL    8.6-10.6     L            



             9422542337)                                         

 

             eGFR (test code =              mL/min/1.73m2              



             4159878027)                                         

 

             MAL (test code = MAL) Association of                           



                          Glomerular Filtration                           



                          Rate (GFR) and Staging                           



                          of Kidney Disease*                           



                          +---------------------                           



                          --+-------------------                           



                          --+-------------------                           



                          ------+| GFR                           



                          (mL/min/1.73 m2) ?|                           



                          With Kidney Damage ?|                           



                          ?Without Kidney                           



                          Damage+---------------                           



                          --------+-------------                           



                          --------+-------------                           



                          ------------+| ?>90 ?                           



                          ? ? ? ? ? ? ? ?|                           



                          ?Stage one ? ? ? ? ?|                           



                          ? Normal ? ? ? ? ? ? ?                           



                          ?+--------------------                           



                          ---+------------------                           



                          ---+------------------                           



                          -------+| ?60-89 ? ? ?                           



                          ? ? ? ? ?| ?Stage two                           



                          ? ? ? ? ?| ? Decreased                           



                          GFR ? ? ? ?                            



                          +---------------------                           



                          --+-------------------                           



                          --+-------------------                           



                          ------+| ?30-59 ? ? ?                           



                          ? ? ? ? ?| ?Stage                           



                          three ? ? ? ?| ? Stage                           



                          three ? ? ? ? ?                           



                          +---------------------                           



                          --+-------------------                           



                          --+-------------------                           



                          ------+| ?15-29 ? ? ?                           



                          ? ? ? ? ?| ?Stage four                           



                          ? ? ? ? | ? Stage four                           



                          ? ? ? ? ?                              



                          ?+--------------------                           



                          ---+------------------                           



                          ---+------------------                           



                          -------+| ?<15 (or                           



                          dialysis) ? ?| ?Stage                           



                          five ? ? ? ? | ? Stage                           



                          five ? ? ? ? ?                           



                          ?+--------------------                           



                          ---+------------------                           



                          ---+------------------                           



                          -------+ *Each stage                           



                          assumes the associated                           



                          GFR level has been in                           



                          effect for at least                           



                          three months. ?Stages                           



                          1 to 5, with or                           



                          without kidney                           



                          disease, indicate                           



                          chronic kidney                           



                          disease. Notes:                           



                          Determination of                           



                          stages one and two                           



                          (with eGFR                             



                          >59mL/min/1.73 m2)                           



                          requires estimation of                           



                          kidney damage for at                           



                          least three months as                           



                          defined by structural                           



                          or functional                           



                          abnormalities of the                           



                          kidney, manifested by                           



                          either:Pathological                           



                          abnormalities or                           



                          Markers of kidney                           



                          damage (including                           



                          abnormalities in the                           



                          composition of the                           



                          blood or urine or                           



                          abnormalities in                           



                          imaging tests).                           

 

             Lab Interpretation Abnormal                               



             (test code = 29553-3)                                        



[facility]





             Test Item    Value        Reference Range Interpretation Comments

 

             MAGNESIUM (test code = 3045337144) 1.8 mg/dL    1.7-2.4            

       

 

             Lab Interpretation (test code = Normal                             

    



             08353-0)                                            



[facility]





             Test Item    Value        Reference Range Interpretation Comments

 

             PHOSPHORUS (test code = 2000909025) 2.2 mg/dL    2.5-5        L    

        

 

             Lab Interpretation (test code = Abnormal                           

    



             09401-9)                                            



[facility]51





             Test Item    Value        Reference Range Interpretation Comments

 

             POCT GLU (test code = 5834428594) 274 mg/dL           H      

      

 

             Lab Interpretation (test code = Abnormal                           

    



             71282-6)                                            



[facility] GLUCOSE (AUTOMATED)2022 01:10:30





             Test Item    Value        Reference Range Interpretation Comments

 

             POCT GLU (test code = 0152949941) 248 mg/dL           H      

      

 

             Lab Interpretation (test code = Abnormal                           

    



             73408-1)                                            



[facility] GLUCOSE (AUTOMATED)2022 01:10:30





             Test Item    Value        Reference Range Interpretation Comments

 

             POCT GLU (test code = 4335368824) 300 mg/dL           H      

      

 

             Lab Interpretation (test code = Abnormal                           

    



             38615-6)                                            



[facility] GLUCOSE (AUTOMATED)2022 16:52:44





             Test Item    Value        Reference Range Interpretation Comments

 

             POCT GLU (test code = 6903851648) 296 mg/dL           H      

      

 

             Lab Interpretation (test code = Abnormal                           

    



             44620-3)                                            



[facility] GLUCOSE (AUTOMATED)2022 16:52:43





             Test Item    Value        Reference Range Interpretation Comments

 

             POCT GLU (test code = 8778443521) 345 mg/dL           H      

      

 

             Lab Interpretation (test code = Abnormal                           

    



             44644-9)                                            



[facility] GLUCOSE (AUTOMATED)2022 16:52:38





             Test Item    Value        Reference Range Interpretation Comments

 

             POCT GLU (test code = 2385307654) 363 mg/dL           H      

      

 

             Lab Interpretation (test code = Abnormal                           

    



             29994-6)                                            



[facility] GLUCOSE (AUTOMATED)2022 16:52:38





             Test Item    Value        Reference Range Interpretation Comments

 

             POCT GLU (test code = 3807314476) 444 mg/dL           H      

      

 

             Lab Interpretation (test code = Abnormal                           

    



             01245-7)                                            



[facility] GLUCOSE (AUTOMATED)2022 16:52:38





             Test Item    Value        Reference Range Interpretation Comments

 

             POCT GLU (test code = 0622136513) 324 mg/dL           H      

      

 

             Lab Interpretation (test code = Abnormal                           

    



             30277-4)                                            



[facility] GLUCOSE (AUTOMATED)2022 16:52:38





             Test Item    Value        Reference Range Interpretation Comments

 

             POCT GLU (test code = 8728371170) 303 mg/dL           H      

      

 

             Lab Interpretation (test code = Abnormal                           

    



             65990-4)                                            



[facility] GLUCOSE (AUTOMATED)2022 16:52:38





             Test Item    Value        Reference Range Interpretation Comments

 

             POCT GLU (test code = 4148294892) 288 mg/dL           H      

      

 

             Lab Interpretation (test code = Abnormal                           

    



             28357-9)                                            



[facility] GLUCOSE (AUTOMATED)2022 16:37:12





             Test Item    Value        Reference Range Interpretation Comments

 

             POCT GLU (test code = 7588942684) 241 mg/dL           H      

      

 

             Lab Interpretation (test code = Abnormal                           

    



             56454-1)                                            



[facility] GLUCOSE (AUTOMATED)2022 13:14:11





             Test Item    Value        Reference Range Interpretation Comments

 

             POCT GLU (test code = 8719670991) 264 mg/dL           H      

      

 

             Lab Interpretation (test code = Abnormal                           

    



             11349-0)                                            



[facility] GLUCOSE (AUTOMATED)2022 11:04:52





             Test Item    Value        Reference Range Interpretation Comments

 

             POCT GLU (test code = 5739408048) 257 mg/dL           H      

      

 

             Lab Interpretation (test code = Abnormal                           

    



             55763-2)                                            



[facility] GLUCOSE (AUTOMATED)2022 07:51:27





             Test Item    Value        Reference Range Interpretation Comments

 

             POCT GLU (test code = 2934602394) 228 mg/dL           H      

      

 

             Lab Interpretation (test code = Abnormal                           

    



             66717-2)                                            



[facility] GLUCOSE (AUTOMATED)2022 03:43:14





             Test Item    Value        Reference Range Interpretation Comments

 

             POCT GLU (test code = 1750190065) 269 mg/dL           H      

      

 

             Lab Interpretation (test code = Abnormal                           

    



             15492-6)                                            



[facility] GLUCOSE (AUTOMATED)2022 00:53:27





             Test Item    Value        Reference Range Interpretation Comments

 

             POCT GLU (test code = 2030705011) 342 mg/dL           H      

      

 

             Lab Interpretation (test code = Abnormal                           

    



             85216-7)                                            



[facility] Metabolic Panel (Na, K, Cl, CO2, 
Glucose, BUN, Creatinine, Ca)2022 00:26:35





             Test Item    Value        Reference Range Interpretation Comments

 

             NA (test code = 161 mmol/L   135-145      HH           



             3213628313)                                         

 

             K (test code = 5.3 mmol/L   3.5-5        H            



             3257345999)                                         

 

             CL (test code = 131 mmol/L          H            



             9243874003)                                         

 

             CO2 TOTAL (test code = 14 mmol/L    23-31        L            



             2427753652)                                         

 

             AGAP (test code =              2-16                      



             3481991004)                                         

 

             BUN (test code = 40 mg/dL     7-23         H            



             3015626780)                                         

 

             GLUCOSE (test code = 363 mg/dL           H            



             1987644724)                                         

 

             CREATININE (test code = 1.31 mg/dL   0.6-1.25     H            



             5929766300)                                         

 

             CALCIUM (test code = 9.0 mg/dL    8.6-10.6                  



             6105503721)                                         

 

             eGFR (test code =              mL/min/1.73m2              



             5762720685)                                         

 

             MAL (test code = MAL) Association of                           



                          Glomerular Filtration                           



                          Rate (GFR) and Staging                           



                          of Kidney Disease*                           



                          +---------------------                           



                          --+-------------------                           



                          --+-------------------                           



                          ------+| GFR                           



                          (mL/min/1.73 m2) ?|                           



                          With Kidney Damage ?|                           



                          ?Without Kidney                           



                          Damage+---------------                           



                          --------+-------------                           



                          --------+-------------                           



                          ------------+| ?>90 ?                           



                          ? ? ? ? ? ? ? ?|                           



                          ?Stage one ? ? ? ? ?|                           



                          ? Normal ? ? ? ? ? ? ?                           



                          ?+--------------------                           



                          ---+------------------                           



                          ---+------------------                           



                          -------+| ?60-89 ? ? ?                           



                          ? ? ? ? ?| ?Stage two                           



                          ? ? ? ? ?| ? Decreased                           



                          GFR ? ? ? ?                            



                          +---------------------                           



                          --+-------------------                           



                          --+-------------------                           



                          ------+| ?30-59 ? ? ?                           



                          ? ? ? ? ?| ?Stage                           



                          three ? ? ? ?| ? Stage                           



                          three ? ? ? ? ?                           



                          +---------------------                           



                          --+-------------------                           



                          --+-------------------                           



                          ------+| ?15-29 ? ? ?                           



                          ? ? ? ? ?| ?Stage four                           



                          ? ? ? ? | ? Stage four                           



                          ? ? ? ? ?                              



                          ?+--------------------                           



                          ---+------------------                           



                          ---+------------------                           



                          -------+| ?<15 (or                           



                          dialysis) ? ?| ?Stage                           



                          five ? ? ? ? | ? Stage                           



                          five ? ? ? ? ?                           



                          ?+--------------------                           



                          ---+------------------                           



                          ---+------------------                           



                          -------+ *Each stage                           



                          assumes the associated                           



                          GFR level has been in                           



                          effect for at least                           



                          three months. ?Stages                           



                          1 to 5, with or                           



                          without kidney                           



                          disease, indicate                           



                          chronic kidney                           



                          disease. Notes:                           



                          Determination of                           



                          stages one and two                           



                          (with eGFR                             



                          >59mL/min/1.73 m2)                           



                          requires estimation of                           



                          kidney damage for at                           



                          least three months as                           



                          defined by structural                           



                          or functional                           



                          abnormalities of the                           



                          kidney, manifested by                           



                          either:Pathological                           



                          abnormalities or                           



                          Markers of kidney                           



                          damage (including                           



                          abnormalities in the                           



                          composition of the                           



                          blood or urine or                           



                          abnormalities in                           



                          imaging tests).                           

 

             Lab Interpretation Abnormal                               



             (test code = 04922-7)                                        



[facility] GLUCOSE (AUTOMATED)2022 22:31:31





             Test Item    Value        Reference Range Interpretation Comments

 

             POCT GLU (test code = 8237911656) 349 mg/dL           H      

      

 

             Lab Interpretation (test code = Abnormal                           

    



             62098-3)                                            



[facility] GLUCOSE (AUTOMATED)2022 20:57:05





             Test Item    Value        Reference Range Interpretation Comments

 

             POCT GLU (test code = 6306655567)                     HH     

      

 

             Lab Interpretation (test code = Abnormal                           

    



             98591-3)                                            



[facility] GLUCOSE (AUTOMATED)2022 20:57:00





             Test Item    Value        Reference Range Interpretation Comments

 

             POCT GLU (test code = 4488837559)                     HH     

      

 

             Lab Interpretation (test code = Abnormal                           

    



             27805-1)                                            



[facility] GLUCOSE (AUTOMATED)2022 20:57:00





             Test Item    Value        Reference Range Interpretation Comments

 

             POCT GLU (test code = 4953579810)                     HH     

      

 

             Lab Interpretation (test code = Abnormal                           

    



             18359-9)                                            



[facility] GLUCOSE (AUTOMATED)2022 20:57:00





             Test Item    Value        Reference Range Interpretation Comments

 

             POCT GLU (test code = 4989544068)                     HH     

      

 

             Lab Interpretation (test code = Abnormal                           

    



             47812-9)                                            



[id number] 12:58:42





             Test Item    Value        Reference Range Interpretation Comments

 

             LACTIC ACID (test code = 4.32 mmol/L  0.5-2.2      H            



             6738586986)                                         

 

             Lab Interpretation (test code = Abnormal                           

    



             96055-2)                                            



[facility]94[facility] 03:51:48





             Test Item    Value        Reference Range Interpretation Comments

 

             PHOSPHORUS (test code = 6.7 mg/dL    2.5-5        H            Slig

ht hemolysis



             7320594522)                                         

 

             Lab Interpretation (test Abnormal                               



             code = 08062-7)                                        



[facility] Y[facility]pl5645[facility]88[facility] 03:51:48





             Test Item    Value        Reference Range Interpretation Comments

 

             MAGNESIUM (test code = 0380292083) 2.8 mg/dL    1.7-2.4      H     

       

 

             Lab Interpretation (test code = Abnormal                           

    



             52934-3)                                            



[id number][facility] 
02:16:52





             Test Item    Value        Reference Range Interpretation Comments

 

             NA (test code = 166 mmol/L   135-145      HH           



             [id number])                                         

 

             K (test code = 5.2 mmol/L   3.5-5        H            



             7138819600)                                         

 

             CL (test code = 123 mmol/L          H            



             4450733658)                                         

 

             CO2 TOTAL (test code = 12 mmol/L    23-31        L            



             5179832406)                                         

 

             AGAP (test code =              2-16         H            



             0028113540)                                         

 

             BUN (test code = 35 mg/dL     7-23         H            



             4387384090)                                         

 

             GLUCOSE (test code = 941 mg/dL           HH           



             8589733683)                                         

 

             CREATININE (test code = 1.51 mg/dL   0.6-1.25     H            



             7174223401)                                         

 

             TOTAL BILI (test code = 1.1 mg/dL    0.1-1.1                   



             4386414193)                                         

 

             CALCIUM (test code = 10.7 mg/dL   8.6-10.6     H            



             0642155168)                                         

 

             T PROTEIN (test code = 8.2 g/dL     6.3-8.2                   



             4199519224)                                         

 

             ALBUMIN (test code = 4.5 g/dL     3.5-5                     



             9331421072)                                         

 

             ALK PHOS (test code = 201 U/L             H            



             3413997844)                                         

 

             ALTv (test code = 43 U/L       5-50                      



             1742-6)                                             

 

             AST(SGOT) (test code = 27 U/L       13-40                     



             1056647169)                                         

 

             eGFR (test code =              mL/min/1.73m2              



             6830946721)                                         

 

             MAL (test code = MAL) Association of                           



                          Glomerular Filtration                           



                          Rate (GFR) and Staging                           



                          of Kidney Disease*                           



                          +---------------------                           



                          --+-------------------                           



                          --+-------------------                           



                          ------+| GFR                           



                          (mL/min/1.73 m2) ?|                           



                          With Kidney Damage ?|                           



                          ?Without Kidney                           



                          Damage+---------------                           



                          --------+-------------                           



                          --------+-------------                           



                          ------------+| ?>90 ?                           



                          ? ? ? ? ? ? ? ?|                           



                          ?Stage one ? ? ? ? ?|                           



                          ? Normal ? ? ? ? ? ? ?                           



                          ?+--------------------                           



                          ---+------------------                           



                          ---+------------------                           



                          -------+| ?60-89 ? ? ?                           



                          ? ? ? ? ?| ?Stage two                           



                          ? ? ? ? ?| ? Decreased                           



                          GFR ? ? ? ?                            



                          +---------------------                           



                          --+-------------------                           



                          --+-------------------                           



                          ------+| ?30-59 ? ? ?                           



                          ? ? ? ? ?| ?Stage                           



                          three ? ? ? ?| ? Stage                           



                          three ? ? ? ? ?                           



                          +---------------------                           



                          --+-------------------                           



                          --+-------------------                           



                          ------+| ?15-29 ? ? ?                           



                          ? ? ? ? ?| ?Stage four                           



                          ? ? ? ? | ? Stage four                           



                          ? ? ? ? ?                              



                          ?+--------------------                           



                          ---+------------------                           



                          ---+------------------                           



                          -------+| ?<15 (or                           



                          dialysis) ? ?| ?Stage                           



                          five ? ? ? ? | ? Stage                           



                          five ? ? ? ? ?                           



                          ?+--------------------                           



                          ---+------------------                           



                          ---+------------------                           



                          -------+ *Each stage                           



                          assumes the associated                           



                          GFR level has been in                           



                          effect for at least                           



                          three months. ?Stages                           



                          1 to 5, with or                           



                          without kidney                           



                          disease, indicate                           



                          chronic kidney                           



                          disease. Notes:                           



                          Determination of                           



                          stages one and two                           



                          (with eGFR                             



                          >59mL/min/1.73 m2)                           



                          requires estimation of                           



                          kidney damage for at                           



                          least three months as                           



                          defined by structural                           



                          or functional                           



                          abnormalities of the                           



                          kidney, manifested by                           



                          either:Pathological                           



                          abnormalities or                           



                          Markers of kidney                           



                          damage (including                           



                          abnormalities in the                           



                          composition of the                           



                          blood or urine or                           



                          abnormalities in                           



                          imaging tests).                           

 

             Lab Interpretation Abnormal                               



             (test code = 37575-8)                                        



[facility]





             Test Item    Value        Reference    Interpretation Comments



                                       Range                     

 

             TROPONIN I (test 0.005 ng/mL  See_Comment                [Automated



             code = 6867285949)                                        message] 

The



                                                                 system which



                                                                 generated this



                                                                 result



                                                                 transmitted



                                                                 reference range

:



                                                                 <=0.034. The



                                                                 reference range



                                                                 was not used to



                                                                 interpret this



                                                                 result as



                                                                 normal/abnormal

.

 

             MAL (test code = Reference (Normal)                           



             MAL)         Range (defined by                           



                          the 99th percentile                           



                          reference limit): <=                           



                          0.034 ng/mL Note:                           



                          Cardiac troponin                           



                          begins to rise 3-4                           



                          hours after the                           



                          onset of ischemia.                           



                          Repeat in 4-6 hours                           



                          if the sample was                           



                          drawn within 3-4                           



                          hours of the onset                           



                          of the symptom and                           



                          found normal.                           



                          Diagnosis of                           



                          myocardial injury is                           



                          made with acute                           



                          changes in cTn                           



                          concentrations with                           



                          at least one serial                           



                          sample above the                           



                          99th percentile                           



                          upper reference                           



                          limit (URL), taken                           



                          together with the                           



                          patient's clinical                           



                          presentation. Biotin                           



                          has been reported to                           



                          cause a negative                           



                          bias, interpret                           



                          results relative to                           



                          patient's use of                           



                          biotin.                                

 

             Lab Interpretation Normal                                 



             (test code =                                        



             11291-8)                                            



[facility]AL[facility]
SALICYLATE&lt;10mg/ 9:06 PM [facility] 
LABORATORYTherapeutic Range: ? ?? ? ? Analgesic and Antipyretic Use ? ? ? ? 20-
100 mg/L ? ? Anti-Inflammatory Use ? ? ? ? ? ? ? ? 100-250 mg/L Toxic Range: ? ?
? ? ? ? ? ? ? ? ? ? ? ? ? Greater than 300 mg/LUnHouston [facility] HospitalSALICYLATE20[facility] 02:06:11SALICYLATE&lt;10mg/ 9:06 PM 
[facility] LABORATORYTherapeutic Range: ? ?? ? ? Analgesic and
Antipyretic Use ? ? ? ?  mg/L ? ? Anti-Inflammatory Use ? ? ? ? ? ? ? ? 
100-250 mg/L Toxic Range: ? ? ? ? ? ? ? ? ? ? ? ? ? ? ? Greater than 300 mg/L
[facility] 02:05[facility]51
ALCOHOL&lt;10mg/dL2022 9:05 PM [facility] 
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
[facility]Un[facility] [place]-[facility] 02:05:51
ALCOHOL&lt;10mg/dL2022 9:05 PM [facility] 
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
ReportedUnHouston [facility] HospitalACETAMINOPHEN2022-07-27 02:03:04





             Test Item    Value        Reference Range Interpretation Comments

 

             ACETAMINOP (test code =              10-30        L            



             8836876133)                                         

 

             MAL (test code = MAL) Toxic: Greater than                          

 



                          200 ug/mL @ 4 hour                           



                          post ingestion or                           



                          greater than 50                           



                          ug/mL @ 12 hour post                           



                          ingestion                              

 

             Lab Interpretation (test Abnormal                               



             code = 74230-4)                                        



[facility] CenterACETAMINOPHEN20[facility] 02:03:04





             Test Item    Value        Reference Range Interpretation Comments

 

             ACETAMINOP (test code =              10-30        L            



             6814751369)                                         

 

             MAL (test code = MLA) Toxic: Greater than                          

 



                          200 ug/mL @ 4 hour                           



                          post ingestion or                           



                          greater than 50                           



                          ug/mL @ 12 hour post                           



                          ingestion                              

 

             Lab Interpretation (test Abnormal                               



             code = 62211-7)                                        



[facility]





             Test Item    Value        Reference Range Interpretation Comments

 

             LIPASE (test code = 8145225244) 246 U/L      0-220        H        

    

 

             Lab Interpretation (test code = Abnormal                           

    



             54035-8)                                            



[facility] CenterCB WITH [id number] 01:12:41





             Test Item    Value        Reference Range Interpretation Comments

 

             WBC (test code =              See_Comment  H             [Automated



             6690-2)                                             message] The



                                                                 system which



                                                                 generated this



                                                                 result transmit

huma



                                                                 reference range

:



                                                                 4.20 - 10.70



                                                                 10*3/?L. The



                                                                 reference range



                                                                 was not used to



                                                                 interpret this



                                                                 result as



                                                                 normal/abnormal

.

 

             RBC (test code =              See_Comment  H             [Automated



             789-8)                                              message] The



                                                                 system which



                                                                 generated this



                                                                 result transmit

huma



                                                                 reference range

:



                                                                 4.26 - 5.52



                                                                 10*6/?L. The



                                                                 reference range



                                                                 was not used to



                                                                 interpret this



                                                                 result as



                                                                 normal/abnormal

.

 

             HGB (test code = 18.3 g/dL    12.2-16.4    H            



             718-7)                                              

 

             HCT (test code = 57.6 %       38.4-49.3    H            



             4544-3)                                             

 

             MCV (test code = 90.7 fL      81.7-95.6                 



             787-2)                                              

 

             MCH (test code = 28.8 pg      26.1-32.7                 



             785-6)                                              

 

             MCHC (test code = 31.8 g/dL    31.2-35                   



             786-4)                                              

 

             RDW-SD (test code = 50.8 fL      38.5-51.6                 



             95642-2)                                            

 

             RDW-CV (test code = 16.4 %       12.1-15.4    H            



             788-0)                                              

 

             PLT (test code =              See_Comment  H             [Automated



             777-3)                                              message] The



                                                                 system which



                                                                 generated this



                                                                 result transmit

huma



                                                                 reference range

:



                                                                 150 - 328 10*3/

?L.



                                                                 The reference



                                                                 range was not u

sed



                                                                 to interpret th

is



                                                                 result as



                                                                 normal/abnormal

.

 

             MPV (test code = 11.6 fL      9.8-13                    



             51819-1)                                            

 

             NRBC/100 WBC (test              See_Comment                [Automat

ed



             code = 3296616100)                                        message] 

The



                                                                 system which



                                                                 generated this



                                                                 result transmit

huma



                                                                 reference range

:



                                                                 0.0 - 10.0 /100



                                                                 WBCs. The



                                                                 reference range



                                                                 was not used to



                                                                 interpret this



                                                                 result as



                                                                 normal/abnormal

.

 

             NRBC x10^3 (test code              See_Comment                [Auto

mated



             = 0269830500)                                        message] The



                                                                 system which



                                                                 generated this



                                                                 result transmit

huma



                                                                 reference range

:



                                                                 10*3/?L. The



                                                                 reference range



                                                                 was not used to



                                                                 interpret this



                                                                 result as



                                                                 normal/abnormal

.

 

             SEG % (test code = 77 %         33-76        H            



             96823-3)                                            

 

             BAND % (test code = 9 %          0-1          H            



             69834-1)                                            

 

             LYMPH % (test code = 7 %          14-54        L            



             72324-8)                                            

 

             MONO % (test code = 7 %          0-4          H            



             26485-3)                                            

 

             ANC (test code = 15.98 10*3/uL 1.99-6.95    H            



             753-4)                                              

 

             OLENA CELLS (test code 2+           See_Comment  A             [Auto

mated



             = 3190-9)                                           message] The



                                                                 system which



                                                                 generated this



                                                                 result transmit

huma



                                                                 reference range

:



                                                                 (none). The



                                                                 reference range



                                                                 was not used to



                                                                 interpret this



                                                                 result as



                                                                 normal/abnormal

.

 

             Lab Interpretation Abnormal                               



             (test code = 49200-4)                                        



[facility] GLUCOSE (AUTOMATED)2022 01:30:22





             Test Item    Value        Reference Range Interpretation Comments

 

             POCT GLU (test code = 9526579240) 392 mg/dL           H      

      

 

             Lab Interpretation (test code = Abnormal                           

    



             34032-3)                                            



[facility] GLUCOSE(AGE &gt;30DAYS)2022 
01:30:00





             Test Item    Value        Reference Range Interpretation Comments

 

             POCT Glu (age>30days) 392 mg/dL,                      



             (test code = 3342) Singer informed                           

 

             Lab Interpretation (test Normal                                 



             code = 50980-6)                                        



[facility] GLUCOSE (AUTOMATED)2022 00:43:22





             Test Item    Value        Reference Range Interpretation Comments

 

             POCT GLU (test code = 3844589621) 430 mg/dL           H      

      

 

             Lab Interpretation (test code = Abnormal                           

    



             32605-4)                                            



[facility] GLUCOSE (AUTOMATED)2022-07-15 23:29:00





             Test Item    Value        Reference Range Interpretation Comments

 

             POCT GLU (test code = 0604545916) 493 mg/dL           HH     

      

 

             Lab Interpretation (test code = Abnormal                           

    



             45660-3)                                            



[facility] METABOLIC PANEL (NA, K, CL, CO2, 
GLUCOSE, BUN, CREATININE, CA)2022-07-15 22:56:10





             Test Item    Value        Reference Range Interpretation Comments

 

             NA (test code = 132 mmol/L   135-145      L            



             4398228465)                                         

 

             K (test code = 4.4 mmol/L   3.5-5                     



             9343735082)                                         

 

             CL (test code = 98 mmol/L                        



             2807116359)                                         

 

             CO2 TOTAL (test code = 19 mmol/L    23-31        L            



             5176645158)                                         

 

             AGAP (test code =              2-16                      



             1551570321)                                         

 

             BUN (test code = 11 mg/dL     7-479)                                         

 

             GLUCOSE (test code = 519 mg/dL           HH           



             4661269490)                                         

 

             CREATININE (test code = 0.55 mg/dL   0.6-1.25     L            



             1926174334)                                         

 

             CALCIUM (test code = 9.5 mg/dL    8.6-10.6                  



             5121919406)                                         

 

             eGFR (test code =              mL/min/1.73m2              



             7509013932)                                         

 

             MAL (test code = MAL) Association of                           



                          Glomerular Filtration                           



                          Rate (GFR) and Staging                           



                          of Kidney Disease*                           



                          +---------------------                           



                          --+-------------------                           



                          --+-------------------                           



                          ------+| GFR                           



                          (mL/min/1.73 m2) ?|                           



                          With Kidney Damage ?|                           



                          ?Without Kidney                           



                          Damage+---------------                           



                          --------+-------------                           



                          --------+-------------                           



                          ------------+| ?>90 ?                           



                          ? ? ? ? ? ? ? ?|                           



                          ?Stage one ? ? ? ? ?|                           



                          ? Normal ? ? ? ? ? ? ?                           



                          ?+--------------------                           



                          ---+------------------                           



                          ---+------------------                           



                          -------+| ?60-89 ? ? ?                           



                          ? ? ? ? ?| ?Stage two                           



                          ? ? ? ? ?| ? Decreased                           



                          GFR ? ? ? ?                            



                          +---------------------                           



                          --+-------------------                           



                          --+-------------------                           



                          ------+| ?30-59 ? ? ?                           



                          ? ? ? ? ?| ?Stage                           



                          three ? ? ? ?| ? Stage                           



                          three ? ? ? ? ?                           



                          +---------------------                           



                          --+-------------------                           



                          --+-------------------                           



                          ------+| ?15-29 ? ? ?                           



                          ? ? ? ? ?| ?Stage four                           



                          ? ? ? ? | ? Stage four                           



                          ? ? ? ? ?                              



                          ?+--------------------                           



                          ---+------------------                           



                          ---+------------------                           



                          -------+| ?<15 (or                           



                          dialysis) ? ?| ?Stage                           



                          five ? ? ? ? | ? Stage                           



                          five ? ? ? ? ?                           



                          ?+--------------------                           



                          ---+------------------                           



                          ---+------------------                           



                          -------+ *Each stage                           



                          assumes the associated                           



                          GFR level has been in                           



                          effect for at least                           



                          three months. ?Stages                           



                          1 to 5, with or                           



                          without kidney                           



                          disease, indicate                           



                          chronic kidney                           



                          disease. Notes:                           



                          Determination of                           



                          stages one and two                           



                          (with eGFR                             



                          >59mL/min/1.73 m2)                           



                          requires estimation of                           



                          kidney damage for at                           



                          least three months as                           



                          defined by structural                           



                          or functional                           



                          abnormalities of the                           



                          kidney, manifested by                           



                          either:Pathological                           



                          abnormalities or                           



                          Markers of kidney                           



                          damage (including                           



                          abnormalities in the                           



                          composition of the                           



                          blood or urine or                           



                          abnormalities in                           



                          imaging tests).                           

 

             Lab Interpretation Abnormal                               



             (test code = 40443-9)                                        



[facility] WITH DIFF2022-07-15 22:45:19





             Test Item    Value        Reference Range Interpretation Comments

 

             WBC (test code =              See_Comment                [Automated



             5090-2)                                             message] The sy

stem



                                                                 which generated



                                                                 this result



                                                                 transmitted



                                                                 reference range

:



                                                                 4.20 - 10.70



                                                                 10*3/?L. The



                                                                 reference range

 was



                                                                 not used to



                                                                 interpret this



                                                                 result as



                                                                 normal/abnormal

.

 

             RBC (test code =              See_Comment                [Automated



             789-8)                                              message] The sy

stem



                                                                 which generated



                                                                 this result



                                                                 transmitted



                                                                 reference range

:



                                                                 4.26 - 5.52



                                                                 10*6/?L. The



                                                                 reference range

 was



                                                                 not used to



                                                                 interpret this



                                                                 result as



                                                                 normal/abnormal

.

 

             HGB (test code = 15.7 g/dL    12.2-16.4                 



             718-7)                                              

 

             HCT (test code = 46.0 %       38.4-49.3                 



             4544-3)                                             

 

             MCV (test code = 85.0 fL      81.7-95.6                 



             787-2)                                              

 

             MCH (test code = 29.0 pg      26.1-32.7                 



             785-6)                                              

 

             MCHC (test code = 34.1 g/dL    31.2-35                   



             786-4)                                              

 

             RDW-SD (test code = 41.7 fL      38.5-51.6                 



             95012-6)                                            

 

             RDW-CV (test code = 13.6 %       12.1-15.4                 



             788-0)                                              

 

             PLT (test code =              See_Comment  H             [Automated



             777-3)                                              message] The sy

stem



                                                                 which generated



                                                                 this result



                                                                 transmitted



                                                                 reference range

:



                                                                 150 - 328 10*3/

?L.



                                                                 The reference r

zhen



                                                                 was not used to



                                                                 interpret this



                                                                 result as



                                                                 normal/abnormal

.

 

             MPV (test code = 10.6 fL      9.8-13                    



             26742-3)                                            

 

             NRBC/100 WBC (test              See_Comment                [Automat

ed



             code = 4452979192)                                        message] 

The system



                                                                 which generated



                                                                 this result



                                                                 transmitted



                                                                 reference range

:



                                                                 0.0 - 10.0 /100



                                                                 WBCs. The refer

ence



                                                                 range was not u

sed



                                                                 to interpret th

is



                                                                 result as



                                                                 normal/abnormal

.

 

             NRBC x10^3 (test code              See_Comment                [Auto

mated



             = 8091900911)                                        message] The s

ystem



                                                                 which generated



                                                                 this result



                                                                 transmitted



                                                                 reference range

:



                                                                 10*3/?L. The



                                                                 reference range

 was



                                                                 not used to



                                                                 interpret this



                                                                 result as



                                                                 normal/abnormal

.

 

             GRAN MAT (NEUT) % 66.6 %                                 



             (test code = 770-8)                                        

 

             IMM GRAN % (test code 0.40 %                                 



             = 6144265400)                                        

 

             LYMPH % (test code = 20.5 %                                 



             736-9)                                              

 

             MONO % (test code = 10.9 %                                 



             5905-5)                                             

 

             EOS % (test code = 1.5 %                                  



             713-8)                                              

 

             BASO % (test code = 0.1 %                                  



             706-2)                                              

 

             GRAN MAT x10^3(ANC) 4.88 10*3/uL 1.99-6.95                 



             (test code =                                        



             0881417571)                                         

 

             IMM GRAN x10^3 (test 0.03 10*3/uL 0-0.06                    



             code = 1276646108)                                        

 

             LYMPH x10^3 (test code 1.50 10*3/uL 1.09-3.23                 



             = 731-0)                                            

 

             MONO x10^3 (test code 0.80 10*3/uL 0.36-1.02                 



             = 742-7)                                            

 

             EOS x10^3 (test code = 0.11 10*3/uL 0.06-0.53                 



             711-2)                                              

 

             BASO x10^3 (test code              0.01-0.09                 



             = 704-7)                                            

 

             Lab Interpretation Abnormal                               



             (test code = 46863-2)                                        



[facility] WITH UALT6534-08-01 11:05:43





             Test Item    Value        Reference Range Interpretation Comments

 

             WBC (test code =              See_Comment  H             [Automated



             8190-2)                                             message] The sy

stem



                                                                 which generated



                                                                 this result



                                                                 transmitted



                                                                 reference range

:



                                                                 4.20 - 10.70



                                                                 10*3/?L. The



                                                                 reference range

 was



                                                                 not used to



                                                                 interpret this



                                                                 result as



                                                                 normal/abnormal

.

 

             RBC (test code =              See_Comment                [Automated



             609-8)                                              message] The sy

stem



                                                                 which generated



                                                                 this result



                                                                 transmitted



                                                                 reference range

:



                                                                 4.26 - 5.52



                                                                 10*6/?L. The



                                                                 reference range

 was



                                                                 not used to



                                                                 interpret this



                                                                 result as



                                                                 normal/abnormal

.

 

             HGB (test code = 15.6 g/dL    12.2-16.4                 



             718-7)                                              

 

             HCT (test code = 46.3 %       38.4-49.3                 



             4544-3)                                             

 

             MCV (test code = 85.4 fL      81.7-95.6                 



             787-2)                                              

 

             MCH (test code = 28.8 pg      26.1-32.7                 



             785-6)                                              

 

             MCHC (test code = 33.7 g/dL    31.2-35.0                 



             786-4)                                              

 

             RDW-SD (test code = 41.7 fL      38.5-51.6                 



             13229-6)                                            

 

             RDW-CV (test code = 13.4 %       12.1-15.4                 



             788-0)                                              

 

             PLT (test code =              See_Comment  H             [Automated



             777-3)                                              message] The sy

stem



                                                                 which generated



                                                                 this result



                                                                 transmitted



                                                                 reference range

:



                                                                 150 - 328 10*3/

?L.



                                                                 The reference r

zhen



                                                                 was not used to



                                                                 interpret this



                                                                 result as



                                                                 normal/abnormal

.

 

             MPV (test code = 10.3 fL      9.8-13.0                  



             99361-1)                                            

 

             NRBC/100 WBC (test              See_Comment                [Automat

ed



             code = 6738704939)                                        message] 

The system



                                                                 which generated



                                                                 this result



                                                                 transmitted



                                                                 reference range

:



                                                                 0.0 - 10.0 /100



                                                                 WBCs. The refer

ence



                                                                 range was not u

sed



                                                                 to interpret th

is



                                                                 result as



                                                                 normal/abnormal

.

 

             NRBC x10^3 (test code <0.01        See_Comment                [Auto

mated



             = [id number])                                        message] The s

ystem



                                                                 which generated



                                                                 this result



                                                                 transmitted



                                                                 reference range

:



                                                                 10*3/?L. The



                                                                 reference range

 was



                                                                 not used to



                                                                 interpret this



                                                                 result as



                                                                 normal/abnormal

.

 

             GRAN MAT (NEUT) % 71.2 %                                 



             (test code = 770-8)                                        

 

             IMM GRAN % (test code 1.30 %                                 



             = 7514387655)                                        

 

             LYMPH % (test code = 18.2 %                                 



             736-9)                                              

 

             MONO % (test code = 7.1 %                                  



             5905-5)                                             

 

             EOS % (test code = 1.9 %                                  



             713-8)                                              

 

             BASO % (test code = 0.3 %                                  



             706-2)                                              

 

             GRAN MAT x10^3(ANC) 8.37 10*3/uL 1.99-6.95    H            



             (test code =                                        



             7794159406)                                         

 

             IMM GRAN x10^3 (test 0.15 10*3/uL 0.00-0.06    H            



             code = 5524232806)                                        

 

             LYMPH x10^3 (test code 2.14 10*3/uL 1.09-3.23                 



             = 731-0)                                            

 

             MONO x10^3 (test code 0.84 10*3/uL 0.36-1.02                 



             = 742-7)                                            

 

             EOS x10^3 (test code = 0.22 10*3/uL 0.06-0.53                 



             711-2)                                              

 

             BASO x10^3 (test code 0.04 10*3/uL 0.01-0.09                 



             = 704-7)                                            

 

             Lab Interpretation Abnormal                               



             (test code = 95198-3)                                        



[facility] [place]. METABOLIC PANEL ([id number])2022 
11:03:21





             Test Item    Value        Reference Range Interpretation Comments

 

             NA (test code = 133 mmol/L   135-145      L            



             5689592352)                                         

 

             K (test code = 4.3 mmol/L   3.5-5.0                   



             1265610353)                                         

 

             CL (test code = 99 mmol/L                        



             2420874861)                                         

 

             CO2 TOTAL (test code = 20 mmol/L    23-31        L            



             3128478083)                                         

 

             AGAP (test code =              2-16                      



             1859362464)                                         

 

             BUN (test code = 11 mg/dL     7-23                      



             0733881160)                                         

 

             GLUCOSE (test code = 357 mg/dL           H            



             6747324022)                                         

 

             CREATININE (test code = 0.52 mg/dL   0.60-1.25    L            



             4606735188)                                         

 

             TOTAL BILI (test code = 0.7 mg/dL    0.1-1.1                   



             3727488146)                                         

 

             CALCIUM (test code = 9.6 mg/dL    8.6-10.6                  



             7645056273)                                         

 

             T PROTEIN (test code = 7.5 g/dL     6.3-8.2                   



             6874678878)                                         

 

             ALBUMIN (test code = 4.2 g/dL     3.5-5.0                   



             1197020410)                                         

 

             ALK PHOS (test code = 185 U/L             H            



             0960186648)                                         

 

             ALTv (test code = 78 U/L       5-50         H            



             1742-6)                                             

 

             AST(SGOT) (test code = 18 U/L       13-40                     



             0347548670)                                         

 

             eGFR (test code =              mL/min/1.73m2              



             3545860439)                                         

 

             MAL (test code = MAL) Association of                           



                          Glomerular Filtration                           



                          Rate (GFR) and Staging                           



                          of Kidney Disease*                           



                          +---------------------                           



                          --+-------------------                           



                          --+-------------------                           



                          ------+| GFR                           



                          (mL/min/1.73 m2) ?|                           



                          With Kidney Damage ?|                           



                          ?Without Kidney                           



                          Damage+---------------                           



                          --------+-------------                           



                          --------+-------------                           



                          ------------+| ?>90 ?                           



                          ? ? ? ? ? ? ? ?|                           



                          ?Stage one ? ? ? ? ?|                           



                          ? Normal ? ? ? ? ? ? ?                           



                          ?+--------------------                           



                          ---+------------------                           



                          ---+------------------                           



                          -------+| ?60-89 ? ? ?                           



                          ? ? ? ? ?| ?Stage two                           



                          ? ? ? ? ?| ? Decreased                           



                          GFR ? ? ? ?                            



                          +---------------------                           



                          --+-------------------                           



                          --+-------------------                           



                          ------+| ?30-59 ? ? ?                           



                          ? ? ? ? ?| ?Stage                           



                          three ? ? ? ?| ? Stage                           



                          three ? ? ? ? ?                           



                          +---------------------                           



                          --+-------------------                           



                          --+-------------------                           



                          ------+| ?15-29 ? ? ?                           



                          ? ? ? ? ?| ?Stage four                           



                          ? ? ? ? | ? Stage four                           



                          ? ? ? ? ?                              



                          ?+--------------------                           



                          ---+------------------                           



                          ---+------------------                           



                          -------+| ?<15 (or                           



                          dialysis) ? ?| ?Stage                           



                          five ? ? ? ? | ? Stage                           



                          five ? ? ? ? ?                           



                          ?+--------------------                           



                          ---+------------------                           



                          ---+------------------                           



                          -------+ *Each stage                           



                          assumes the associated                           



                          GFR level has been in                           



                          effect for at least                           



                          three months. ?Stages                           



                          1 to 5, with or                           



                          without kidney                           



                          disease, indicate                           



                          chronic kidney                           



                          disease. Notes:                           



                          Determination of                           



                          stages one and two                           



                          (with eGFR                             



                          >59mL/min/1.73 m2)                           



                          requires estimation of                           



                          kidney damage for at                           



                          least three months as                           



                          defined by structural                           



                          or functional                           



                          abnormalities of the                           



                          kidney, manifested by                           



                          either:Pathological                           



                          abnormalities or                           



                          Markers of kidney                           



                          damage (including                           



                          abnormalities in the                           



                          composition of the                           



                          blood or urine or                           



                          abnormalities in                           



                          imaging tests).                           

 

             Lab Interpretation Abnormal                               



             (test code = 65971-8)                                        



[facility]





             Test Item    Value        Reference Range Interpretation Comments

 

             LIPASE (test code = 5295872323) 63 U/L       0-220                 

    

 

             Lab Interpretation (test code = Normal                             

    



             80004-8)                                            



[facility] GLUCOSE (AUTOMATED)2022 22:54:04





             Test Item    Value        Reference Range Interpretation Comments

 

             POCT GLU (test code = 8266737769) 361 mg/dL           H      

      

 

             Lab Interpretation (test code = Abnormal                           

    



             23008-1)                                            



[facility] GLUCOSE (AUTOMATED)2022 12:19:07





             Test Item    Value        Reference Range Interpretation Comments

 

             POCT GLU (test code = 2620627511) 390 mg/dL           H      

      

 

             Lab Interpretation (test code = Abnormal                           

    



             99814-8)                                            



[facility] GLUCOSE (AUTOMATED)2022 04:48:35





             Test Item    Value        Reference Range Interpretation Comments

 

             POCT GLU (test code = 2654074063) 412 mg/dL           H      

      

 

             Lab Interpretation (test code = Abnormal                           

    



             11367-8)                                            



[facility] GLUCOSE (AUTOMATED)2022 01:44:33





             Test Item    Value        Reference Range Interpretation Comments

 

             POCT GLU (test code = 5120076185) 376 mg/dL           H      

      

 

             Lab Interpretation (test code = Abnormal                           

    



             40012-1)                                            



[facility] GLUCOSE (AUTOMATED)2022 21:51:37





             Test Item    Value        Reference Range Interpretation Comments

 

             POCT GLU (test code = 4401265769) 267 mg/dL           H      

      

 

             Lab Interpretation (test code = Abnormal                           

    



             44657-5)                                            



[facility] GLUCOSE (AUTOMATED)2022 17:05:21





             Test Item    Value        Reference Range Interpretation Comments

 

             POCT GLU (test code = 5579998501) 377 mg/dL           H      

      

 

             Lab Interpretation (test code = Abnormal                           

    



             47277-8)                                            



[facility] GLUCOSE (AUTOMATED)2022 13:10:54





             Test Item    Value        Reference Range Interpretation Comments

 

             POCT GLU (test code = 9339233978) 495 mg/dL           HH     

      

 

             Lab Interpretation (test code = Abnormal                           

    



             08623-9)                                            



[facility] GLUCOSE (AUTOMATED)2022 08:34:04





             Test Item    Value        Reference Range Interpretation Comments

 

             POCT GLU (test code = 2975525585) 427 mg/dL           H      

      

 

             Lab Interpretation (test code = Abnormal                           

    



             41267-7)                                            



[facility] GLUCOSE (AUTOMATED)2022 05:46:02





             Test Item    Value        Reference Range Interpretation Comments

 

             POCT GLU (test code = 9620007188) 451 mg/dL           HH     

      

 

             Lab Interpretation (test code = Abnormal                           

    



             11648-1)                                            



[facility] GLUCOSE (AUTOMATED)2022 02:44:38





             Test Item    Value        Reference Range Interpretation Comments

 

             POCT GLU (test code = 9094278597) 429 mg/dL           H      

      

 

             Lab Interpretation (test code = Abnormal                           

    



             49332-3)                                            



[facility] GLUCOSE (AUTOMATED)2022 22:38:24





             Test Item    Value        Reference Range Interpretation Comments

 

             POCT GLU (test code = 7686771595) 404 mg/dL           H      

      

 

             Lab Interpretation (test code = Abnormal                           

    



             91356-6)                                            



[facility] GLUCOSE (AUTOMATED)2022 18:05:20





             Test Item    Value        Reference Range Interpretation Comments

 

             POCT GLU (test code = 1105824391) 423 mg/dL           H      

      

 

             Lab Interpretation (test code = Abnormal                           

    



             88809-0)                                            



[facility] METABOLIC PANEL (NA, K, CL, CO2, 
GLUCOSE, BUN, CREATININE, CA)2022 14:56:25





             Test Item    Value        Reference Range Interpretation Comments

 

             NA (test code = 135 mmol/L   135-145                   



             0662544219)                                         

 

             K (test code = 4.0 mmol/L   3.5-5.0                   



             1572911481)                                         

 

             CL (test code = 102 mmol/L                       



             0774479073)                                         

 

             CO2 TOTAL (test code = 22 mmol/L    23-31        L            



             0027624244)                                         

 

             AGAP (test code =              2-16                      



             2340858830)                                         

 

             BUN (test code = 13 mg/dL     7-23                      



             4702675134)                                         

 

             GLUCOSE (test code = 547 mg/dL           HH           



             6357040418)                                         

 

             CREATININE (test code = 0.65 mg/dL   0.60-1.25                 



             7735526643)                                         

 

             CALCIUM (test code = 8.6 mg/dL    8.6-10.6                  



             7593687670)                                         

 

             eGFR (test code =              mL/min/1.73m2              



             6914140213)                                         

 

             MAL (test code = MAL) Association of                           



                          Glomerular Filtration                           



                          Rate (GFR) and Staging                           



                          of Kidney Disease*                           



                          +---------------------                           



                          --+-------------------                           



                          --+-------------------                           



                          ------+| GFR                           



                          (mL/min/1.73 m2) ?|                           



                          With Kidney Damage ?|                           



                          ?Without Kidney                           



                          Damage+---------------                           



                          --------+-------------                           



                          --------+-------------                           



                          ------------+| ?>90 ?                           



                          ? ? ? ? ? ? ? ?|                           



                          ?Stage one ? ? ? ? ?|                           



                          ? Normal ? ? ? ? ? ? ?                           



                          ?+--------------------                           



                          ---+------------------                           



                          ---+------------------                           



                          -------+| ?60-89 ? ? ?                           



                          ? ? ? ? ?| ?Stage two                           



                          ? ? ? ? ?| ? Decreased                           



                          GFR ? ? ? ?                            



                          +---------------------                           



                          --+-------------------                           



                          --+-------------------                           



                          ------+| ?30-59 ? ? ?                           



                          ? ? ? ? ?| ?Stage                           



                          three ? ? ? ?| ? Stage                           



                          three ? ? ? ? ?                           



                          +---------------------                           



                          --+-------------------                           



                          --+-------------------                           



                          ------+| ?15-29 ? ? ?                           



                          ? ? ? ? ?| ?Stage four                           



                          ? ? ? ? | ? Stage four                           



                          ? ? ? ? ?                              



                          ?+--------------------                           



                          ---+------------------                           



                          ---+------------------                           



                          -------+| ?<15 (or                           



                          dialysis) ? ?| ?Stage                           



                          five ? ? ? ? | ? Stage                           



                          five ? ? ? ? ?                           



                          ?+--------------------                           



                          ---+------------------                           



                          ---+------------------                           



                          -------+ *Each stage                           



                          assumes the associated                           



                          GFR level has been in                           



                          effect for at least                           



                          three months. ?Stages                           



                          1 to 5, with or                           



                          without kidney                           



                          disease, indicate                           



                          chronic kidney                           



                          disease. Notes:                           



                          Determination of                           



                          stages one and two                           



                          (with eGFR                             



                          >59mL/min/1.73 m2)                           



                          requires estimation of                           



                          kidney damage for at                           



                          least three months as                           



                          defined by structural                           



                          or functional                           



                          abnormalities of the                           



                          kidney, manifested by                           



                          either:Pathological                           



                          abnormalities or                           



                          Markers of kidney                           



                          damage (including                           



                          abnormalities in the                           



                          composition of the                           



                          blood or urine or                           



                          abnormalities in                           



                          imaging tests).                           

 

             Lab Interpretation Abnormal                               



             (test code = 74765-8)                                        



[facility] GLUCOSE (AUTOMATED)2022 13:36:27





             Test Item    Value        Reference Range Interpretation Comments

 

             POCT GLU (test code = 0669540688) 526 mg/dL           HH     

      

 

             Lab Interpretation (test code = Abnormal                           

    



             67545-1)                                            



[facility] GLUCOSE (AUTOMATED)2022 12:52:44





             Test Item    Value        Reference Range Interpretation Comments

 

             POCT GLU (test code = 8032312124) >600                HH     

      

 

             Lab Interpretation (test code = Abnormal                           

    



             38091-6)                                            



[facility] GLUCOSE (AUTOMATED)2022 12:52:44





             Test Item    Value        Reference Range Interpretation Comments

 

             POCT GLU (test code = 4838227591) >600                HH     

      

 

             Lab Interpretation (test code = Abnormal                           

    



             28301-4)                                            



[facility] GLUCOSE (AUTOMATED)2022 11:02:02





             Test Item    Value        Reference Range Interpretation Comments

 

             POCT GLU (test code = 8802301626) 521 mg/dL           HH     

      

 

             Lab Interpretation (test code = Abnormal                           

    



             32641-9)                                            



[facility] GLUCOSE (AUTOMATED)2022 07:22:18





             Test Item    Value        Reference Range Interpretation Comments

 

             POCT GLU (test code = 7478072877) 534 mg/dL           HH     

      

 

             Lab Interpretation (test code = Abnormal                           

    



             15731-0)                                            



[facility] METABOLIC PANEL (NA, K, CL, CO2, 
GLUCOSE, BUN, CREATININE, CA)2022 06:34:51





             Test Item    Value        Reference Range Interpretation Comments

 

             NA (test code = 133 mmol/L   135-145      L            



             4389473443)                                         

 

             K (test code = 4.9 mmol/L   3.5-5.0                   Slight



             1563911072)                                         hemolysis

 

             CL (test code = 96 mmol/L           L            



             4384925141)                                         

 

             CO2 TOTAL (test code 21 mmol/L    23-31        L            



             = 6626702037)                                        

 

             AGAP (test code =              2-16                      



             8785318928)                                         

 

             BUN (test code = 12 mg/dL     7-23                      Slight



             1623031294)                                         hemolysis

 

             GLUCOSE (test code = 639 mg/dL           HH           



             1643993794)                                         

 

             CREATININE (test code 0.46 mg/dL   0.60-1.25    L            



             = 1770956766)                                        

 

             CALCIUM (test code = 9.7 mg/dL    8.6-10.6                  



             4241738842)                                         

 

             eGFR (test code =              mL/min/1.73m2              



             3292788170)                                         

 

             MAL (test code = MAL) Association of                           



                          Glomerular                             



                          Filtration Rate                           



                          (GFR) and Staging                           



                          of Kidney Disease*                           



                          +------------------                           



                          -----+-------------                           



                          --------+----------                           



                          ---------------+|                           



                          GFR (mL/min/1.73                           



                          m2) ?| With Kidney                           



                          Damage ?| ?Without                           



                          Kidney                                 



                          Damage+------------                           



                          -----------+-------                           



                          --------------+----                           



                          -------------------                           



                          --+| ?>90 ? ? ? ? ?                           



                          ? ? ? ?| ?Stage one                           



                          ? ? ? ? ?| ? Normal                           



                          ? ? ? ? ? ? ?                           



                          ?+-----------------                           



                          ------+------------                           



                          ---------+---------                           



                          ----------------+|                           



                          ?60-89 ? ? ? ? ? ?                           



                          ? ?| ?Stage two ? ?                           



                          ? ? ?| ? Decreased                           



                          GFR ? ? ? ?                            



                          +------------------                           



                          -----+-------------                           



                          --------+----------                           



                          ---------------+|                           



                          ?30-59 ? ? ? ? ? ?                           



                          ? ?| ?Stage three ?                           



                          ? ? ?| ? Stage                           



                          three ? ? ? ? ?                           



                          +------------------                           



                          -----+-------------                           



                          --------+----------                           



                          ---------------+|                           



                          ?15-29 ? ? ? ? ? ?                           



                          ? ?| ?Stage four ?                           



                          ? ? ? | ? Stage                           



                          four ? ? ? ? ?                           



                          ?+-----------------                           



                          ------+------------                           



                          ---------+---------                           



                          ----------------+|                           



                          ?<15 (or dialysis)                           



                          ? ?| ?Stage five ?                           



                          ? ? ? | ? Stage                           



                          five ? ? ? ? ?                           



                          ?+-----------------                           



                          ------+------------                           



                          ---------+---------                           



                          ----------------+                           



                          *Each stage assumes                           



                          the associated GFR                           



                          level has been in                           



                          effect for at least                           



                          three months.                           



                          ?Stages 1 to 5,                           



                          with or without                           



                          kidney disease,                           



                          indicate chronic                           



                          kidney disease.                           



                          Notes:                                 



                          Determination of                           



                          stages one and two                           



                          (with eGFR                             



                          >59mL/min/1.73 m2)                           



                          requires estimation                           



                          of kidney damage                           



                          for at least three                           



                          months as defined                           



                          by structural or                           



                          functional                             



                          abnormalities of                           



                          the kidney,                            



                          manifested by                           



                          either:Pathological                           



                          abnormalities or                           



                          Markers of kidney                           



                          damage (including                           



                          abnormalities in                           



                          the composition of                           



                          the blood or urine                           



                          or abnormalities in                           



                          imaging tests).                           

 

             Lab Interpretation Abnormal                               



             (test code = 74595-0)                                        



[facility]. METABOLIC PANEL ([id number])2022 
02:59:57





             Test Item    Value        Reference Range Interpretation Comments

 

             NA (test code = 126 mmol/L   135-145      L            



             9473677767)                                         

 

             K (test code = 5.2 mmol/L   3.5-5.0      H            



             7436985151)                                         

 

             CL (test code = 89 mmol/L           L            



             8683552711)                                         

 

             CO2 TOTAL (test code = 20 mmol/L    23-31        L            



             4920590159)                                         

 

             AGAP (test code =              2-16         H            



             0780248242)                                         

 

             BUN (test code = 12 mg/dL     7-23                      



             2495240815)                                         

 

             GLUCOSE (test code = 856 mg/dL           HH           



             9438452350)                                         

 

             CREATININE (test code = 0.49 mg/dL   0.60-1.25    L            



             5611150942)                                         

 

             TOTAL BILI (test code = 0.7 mg/dL    0.1-1.1                   



             4473049717)                                         

 

             CALCIUM (test code = 10.2 mg/dL   8.6-10.6                  



             1656360735)                                         

 

             T PROTEIN (test code = 8.2 g/dL     6.3-8.2                   



             5459595387)                                         

 

             ALBUMIN (test code = 4.5 g/dL     3.5-5.0                   



             8451529235)                                         

 

             ALK PHOS (test code = 186 U/L             H            



             0304882542)                                         

 

             ALTv (test code = 27 U/L       5-50                      



             2-6)                                             

 

             AST(SGOT) (test code = 18 U/L       13-40                     



             5641787353)                                         

 

             eGFR (test code =              mL/min/1.73m2              



             0061237289)                                         

 

             MAL (test code = MAL) Association of                           



                          Glomerular Filtration                           



                          Rate (GFR) and Staging                           



                          of Kidney Disease*                           



                          +---------------------                           



                          --+-------------------                           



                          --+-------------------                           



                          ------+| GFR                           



                          (mL/min/1.73 m2) ?|                           



                          With Kidney Damage ?|                           



                          ?Without Kidney                           



                          Damage+---------------                           



                          --------+-------------                           



                          --------+-------------                           



                          ------------+| ?>90 ?                           



                          ? ? ? ? ? ? ? ?|                           



                          ?Stage one ? ? ? ? ?|                           



                          ? Normal ? ? ? ? ? ? ?                           



                          ?+--------------------                           



                          ---+------------------                           



                          ---+------------------                           



                          -------+| ?60-89 ? ? ?                           



                          ? ? ? ? ?| ?Stage two                           



                          ? ? ? ? ?| ? Decreased                           



                          GFR ? ? ? ?                            



                          +---------------------                           



                          --+-------------------                           



                          --+-------------------                           



                          ------+| ?30-59 ? ? ?                           



                          ? ? ? ? ?| ?Stage                           



                          three ? ? ? ?| ? Stage                           



                          three ? ? ? ? ?                           



                          +---------------------                           



                          --+-------------------                           



                          --+-------------------                           



                          ------+| ?15-29 ? ? ?                           



                          ? ? ? ? ?| ?Stage four                           



                          ? ? ? ? | ? Stage four                           



                          ? ? ? ? ?                              



                          ?+--------------------                           



                          ---+------------------                           



                          ---+------------------                           



                          -------+| ?<15 (or                           



                          dialysis) ? ?| ?Stage                           



                          five ? ? ? ? | ? Stage                           



                          five ? ? ? ? ?                           



                          ?+--------------------                           



                          ---+------------------                           



                          ---+------------------                           



                          -------+ *Each stage                           



                          assumes the associated                           



                          GFR level has been in                           



                          effect for at least                           



                          three months. ?Stages                           



                          1 to 5, with or                           



                          without kidney                           



                          disease, indicate                           



                          chronic kidney                           



                          disease. Notes:                           



                          Determination of                           



                          stages one and two                           



                          (with eGFR                             



                          >59mL/min/1.73 m2)                           



                          requires estimation of                           



                          kidney damage for at                           



                          least three months as                           



                          defined by structural                           



                          or functional                           



                          abnormalities of the                           



                          kidney, manifested by                           



                          either:Pathological                           



                          abnormalities or                           



                          Markers of kidney                           



                          damage (including                           



                          abnormalities in the                           



                          composition of the                           



                          blood or urine or                           



                          abnormalities in                           



                          imaging tests).                           

 

             Lab Interpretation Abnormal                               



             (test code = 16568-9)                                        



[facility]





             Test Item    Value        Reference Range Interpretation Comments

 

             LIPASE (test code = 7529308895) 146 U/L      0-220                 

    

 

             Lab Interpretation (test code = Normal                             

    



             61086-1)                                            



[id number]





             Test Item    Value        Reference Range Interpretation Comments

 

             WBC (test code =              See_Comment                [Automated



             6690-2)                                             message] The sy

stem



                                                                 which generated



                                                                 this result



                                                                 transmitted



                                                                 reference range

:



                                                                 4.20 - 10.70



                                                                 10*3/?L. The



                                                                 reference range

 was



                                                                 not used to



                                                                 interpret this



                                                                 result as



                                                                 normal/abnormal

.

 

             RBC (test code =              See_Comment                [Automated



             789-8)                                              message] The sy

stem



                                                                 which generated



                                                                 this result



                                                                 transmitted



                                                                 reference range

:



                                                                 4.26 - 5.52



                                                                 10*6/?L. The



                                                                 reference range

 was



                                                                 not used to



                                                                 interpret this



                                                                 result as



                                                                 normal/abnormal

.

 

             HGB (test code = 16.1 g/dL    12.2-16.4                 



             718-7)                                              

 

             HCT (test code = 47.5 %       38.4-49.3                 



             4544-3)                                             

 

             MCV (test code = 86.2 fL      81.7-95.6                 



             787-2)                                              

 

             MCH (test code = 29.2 pg      26.1-32.7                 



             785-6)                                              

 

             MCHC (test code = 33.9 g/dL    31.2-35.0                 



             786-4)                                              

 

             RDW-SD (test code = 42.0 fL      38.5-51.6                 



             88162-4)                                            

 

             RDW-CV (test code = 13.5 %       12.1-15.4                 



             788-0)                                              

 

             PLT (test code =              See_Comment  H             [Automated



             777-3)                                              message] The sy

stem



                                                                 which generated



                                                                 this result



                                                                 transmitted



                                                                 reference range

:



                                                                 150 - 328 10*3/

?L.



                                                                 The reference r

zhen



                                                                 was not used to



                                                                 interpret this



                                                                 result as



                                                                 normal/abnormal

.

 

             MPV (test code = 10.5 fL      9.8-13.0                  



             68692-8)                                            

 

             NRBC/100 WBC (test              See_Comment                [Automat

ed



             code = 5510213121)                                        message] 

The system



                                                                 which generated



                                                                 this result



                                                                 transmitted



                                                                 reference range

:



                                                                 0.0 - 10.0 /100



                                                                 WBCs. The refer

ence



                                                                 range was not u

sed



                                                                 to interpret th

is



                                                                 result as



                                                                 normal/abnormal

.

 

             NRBC x10^3 (test code <0.01        See_Comment                [Auto

mated



             = [id number])                                        message] The s

ystem



                                                                 which generated



                                                                 this result



                                                                 transmitted



                                                                 reference range

:



                                                                 10*3/?L. The



                                                                 reference range

 was



                                                                 not used to



                                                                 interpret this



                                                                 result as



                                                                 normal/abnormal

.

 

             GRAN MAT (NEUT) % 67.5 %                                 



             (test code = 770-8)                                        

 

             IMM GRAN % (test code 0.70 %                                 



             = 1288223020)                                        

 

             LYMPH % (test code = 21.7 %                                 



             736-9)                                              

 

             MONO % (test code = 8.4 %                                  



             5905-5)                                             

 

             EOS % (test code = 1.3 %                                  



             713-8)                                              

 

             BASO % (test code = 0.4 %                                  



             706-2)                                              

 

             GRAN MAT x10^3(ANC) 6.61 10*3/uL 1.99-6.95                 



             (test code =                                        



             2161635987)                                         

 

             IMM GRAN x10^3 (test 0.07 10*3/uL 0.00-0.06    H            



             code = 7612003860)                                        

 

             LYMPH x10^3 (test code 2.12 10*3/uL 1.09-3.23                 



             = 731-0)                                            

 

             MONO x10^3 (test code 0.82 10*3/uL 0.36-1.02                 



             = 742-7)                                            

 

             EOS x10^3 (test code = 0.13 10*3/uL 0.06-0.53                 



             711-2)                                              

 

             BASO x10^3 (test code 0.04 10*3/uL 0.01-0.09                 



             = 704-7)                                            

 

             Lab Interpretation Abnormal                               



             (test code = 80807-7)                                        



[facility]B,T.PRO,BILI
T,BU/BC,ALT,AST,ALK PHOS)2022 13:57:09





             Test Item    Value        Reference Range Interpretation Comments

 

             TOTAL BILI (test code = 2059347673) 0.7 mg/dL    0.1-1.1           

        

 

             BILI UNCON (test code = 6587333150) 0.3 mg/dL    0.1-1.1           

        

 

             BILI CONJ (test code = 1276709061) 0.0 mg/dL    0.0-0.3            

       

 

             T PROTEIN (test code = 8159283580) 7.5 g/dL     6.3-8.2            

       

 

             ALBUMIN (test code = 0013223474) 3.8 g/dL     3.5-5.0              

     

 

             ALK PHOS (test code = 6044175037) 161 U/L             H      

      

 

             ALTv (test code = 1742-6) 44 U/L       5-50                      

 

             AST(SGOT) (test code = 0357794730) 47 U/L       13-40        H     

       

 

             Lab Interpretation (test code = Abnormal                           

    



             59714-3)                                            



[facility] METABOLIC PANEL (NA, K, CL, CO2, 
GLUCOSE, BUN, CREATININE, CA)2022 10:48:59





             Test Item    Value        Reference Range Interpretation Comments

 

             NA (test code = 136 mmol/L   135-145                   



             7755457841)                                         

 

             K (test code = 3.8 mmol/L   3.5-5.0                   



             6743850906)                                         

 

             CL (test code = 101 mmol/L                       



             4767673048)                                         

 

             CO2 TOTAL (test code = 22 mmol/L    23-31        L            



             1523043741)                                         

 

             AGAP (test code =              2-16                      



             0116792542)                                         

 

             BUN (test code = 16 mg/dL     7-23                      



             7693982897)                                         

 

             GLUCOSE (test code = 358 mg/dL           H            



             3386494805)                                         

 

             CREATININE (test code = 0.63 mg/dL   0.60-1.25                 



             0957440672)                                         

 

             CALCIUM (test code = 9.2 mg/dL    8.6-10.6                  



             8928603076)                                         

 

             eGFR (test code =              mL/min/1.73m2              



             5406803450)                                         

 

             MAL (test code = MAL) Association of                           



                          Glomerular Filtration                           



                          Rate (GFR) and Staging                           



                          of Kidney Disease*                           



                          +---------------------                           



                          --+-------------------                           



                          --+-------------------                           



                          ------+| GFR                           



                          (mL/min/1.73 m2) ?|                           



                          With Kidney Damage ?|                           



                          ?Without Kidney                           



                          Damage+---------------                           



                          --------+-------------                           



                          --------+-------------                           



                          ------------+| ?>90 ?                           



                          ? ? ? ? ? ? ? ?|                           



                          ?Stage one ? ? ? ? ?|                           



                          ? Normal ? ? ? ? ? ? ?                           



                          ?+--------------------                           



                          ---+------------------                           



                          ---+------------------                           



                          -------+| ?60-89 ? ? ?                           



                          ? ? ? ? ?| ?Stage two                           



                          ? ? ? ? ?| ? Decreased                           



                          GFR ? ? ? ?                            



                          +---------------------                           



                          --+-------------------                           



                          --+-------------------                           



                          ------+| ?30-59 ? ? ?                           



                          ? ? ? ? ?| ?Stage                           



                          three ? ? ? ?| ? Stage                           



                          three ? ? ? ? ?                           



                          +---------------------                           



                          --+-------------------                           



                          --+-------------------                           



                          ------+| ?15-29 ? ? ?                           



                          ? ? ? ? ?| ?Stage four                           



                          ? ? ? ? | ? Stage four                           



                          ? ? ? ? ?                              



                          ?+--------------------                           



                          ---+------------------                           



                          ---+------------------                           



                          -------+| ?<15 (or                           



                          dialysis) ? ?| ?Stage                           



                          five ? ? ? ? | ? Stage                           



                          five ? ? ? ? ?                           



                          ?+--------------------                           



                          ---+------------------                           



                          ---+------------------                           



                          -------+ *Each stage                           



                          assumes the associated                           



                          GFR level has been in                           



                          effect for at least                           



                          three months. ?Stages                           



                          1 to 5, with or                           



                          without kidney                           



                          disease, indicate                           



                          chronic kidney                           



                          disease. Notes:                           



                          Determination of                           



                          stages one and two                           



                          (with eGFR                             



                          >59mL/min/1.73 m2)                           



                          requires estimation of                           



                          kidney damage for at                           



                          least three months as                           



                          defined by structural                           



                          or functional                           



                          abnormalities of the                           



                          kidney, manifested by                           



                          either:Pathological                           



                          abnormalities or                           



                          Markers of kidney                           



                          damage (including                           



                          abnormalities in the                           



                          composition of the                           



                          blood or urine or                           



                          abnormalities in                           



                          imaging tests).                           

 

             Lab Interpretation Abnormal                               



             (test code = 68497-7)                                        



[facility] WITH [id number] 10:01:35





             Test Item    Value        Reference Range Interpretation Comments

 

             WBC (test code =              See_Comment                [Automated



             4235-2)                                             message] The sy

stem



                                                                 which generated



                                                                 this result



                                                                 transmitted



                                                                 reference range

:



                                                                 4.20 - 10.70



                                                                 10*3/?L. The



                                                                 reference range

 was



                                                                 not used to



                                                                 interpret this



                                                                 result as



                                                                 normal/abnormal

.

 

             RBC (test code =              See_Comment                [Automated



             568-8)                                              message] The sy

stem



                                                                 which generated



                                                                 this result



                                                                 transmitted



                                                                 reference range

:



                                                                 4.26 - 5.52



                                                                 10*6/?L. The



                                                                 reference range

 was



                                                                 not used to



                                                                 interpret this



                                                                 result as



                                                                 normal/abnormal

.

 

             HGB (test code = 14.9 g/dL    12.2-16.4                 



             718-7)                                              

 

             HCT (test code = 43.2 %       38.4-49.3                 



             4544-3)                                             

 

             MCV (test code = 86.1 fL      81.7-95.6                 



             787-2)                                              

 

             MCH (test code = 29.7 pg      26.1-32.7                 



             785-6)                                              

 

             MCHC (test code = 34.5 g/dL    31.2-35.0                 



             786-4)                                              

 

             RDW-SD (test code = 41.6 fL      38.5-51.6                 



             88167-4)                                            

 

             RDW-CV (test code = 13.4 %       12.1-15.4                 



             788-0)                                              

 

             PLT (test code =              See_Comment  H             [Automated



             869-3)                                              message] The sy

stem



                                                                 which generated



                                                                 this result



                                                                 transmitted



                                                                 reference range

:



                                                                 150 - 328 10*3/

?L.



                                                                 The reference r

zhen



                                                                 was not used to



                                                                 interpret this



                                                                 result as



                                                                 normal/abnormal

.

 

             MPV (test code = 11.0 fL      9.8-13.0                  



             16838-9)                                            

 

             NRBC/100 WBC (test              See_Comment                [Automat

ed



             code = 3611818241)                                        message] 

The system



                                                                 which generated



                                                                 this result



                                                                 transmitted



                                                                 reference range

:



                                                                 0.0 - 10.0 /100



                                                                 WBCs. The refer

ence



                                                                 range was not u

sed



                                                                 to interpret th

is



                                                                 result as



                                                                 normal/abnormal

.

 

             NRBC x10^3 (test code <0.01        See_Comment                [Auto

mated



             = 3042428253)                                        message] The s

ystem



                                                                 which generated



                                                                 this result



                                                                 transmitted



                                                                 reference range

:



                                                                 10*3/?L. The



                                                                 reference range

 was



                                                                 not used to



                                                                 interpret this



                                                                 result as



                                                                 normal/abnormal

.

 

             GRAN MAT (NEUT) % 62.6 %                                 



             (test code = 770-8)                                        

 

             IMM GRAN % (test code 0.40 %                                 



             = 6285273081)                                        

 

             LYMPH % (test code = 23.2 %                                 



             736-9)                                              

 

             MONO % (test code = 9.6 %                                  



             5905-5)                                             

 

             EOS % (test code = 3.8 %                                  



             713-8)                                              

 

             BASO % (test code = 0.4 %                                  



             706-2)                                              

 

             GRAN MAT x10^3(ANC) 4.76 10*3/uL 1.99-6.95                 



             (test code =                                        



             9359284071)                                         

 

             IMM GRAN x10^3 (test 0.03 10*3/uL 0.00-0.06                 



             code = 4947132291)                                        

 

             LYMPH x10^3 (test code 1.76 10*3/uL 1.09-3.23                 



             = 731-0)                                            

 

             MONO x10^3 (test code 0.73 10*3/uL 0.36-1.02                 



             = 742-7)                                            

 

             EOS x10^3 (test code = 0.29 10*3/uL 0.06-0.53                 



             711-2)                                              

 

             BASO x10^3 (test code 0.03 10*3/uL 0.01-0.09                 



             = 704-7)                                            

 

             Lab Interpretation Abnormal                               



             (test code = 97754-6)                                        



[facility] 
04:36:59





             Test Item    Value        Reference Range Interpretation Comments

 

             NA (test code = 138 mmol/L   135-145                   



             6638733471)                                         

 

             K (test code = 5.4 mmol/L   3.5-5.0      H            



             6817929552)                                         

 

             CL (test code = 98 mmol/L                        



             6724559303)                                         

 

             CO2 TOTAL (test code = 17 mmol/L    23-31        L            



             2801499430)                                         

 

             AGAP (test code =              2-16         H            



             0832064122)                                         

 

             BUN (test code = 19 mg/dL     7-23                      



             4346594946)                                         

 

             GLUCOSE (test code = 469 mg/dL           HH           



             6857319116)                                         

 

             CREATININE (test code = 0.74 mg/dL   0.60-1.25                 



             4455807388)                                         

 

             TOTAL BILI (test code = 1.0 mg/dL    0.1-1.1                   



             3425769400)                                         

 

             CALCIUM (test code = 10.1 mg/dL   8.6-10.6                  



             4260799064)                                         

 

             T PROTEIN (test code = 8.2 g/dL     6.3-8.2                   



             4760385414)                                         

 

             ALBUMIN (test code = 4.6 g/dL     3.5-5.0                   



             7496820461)                                         

 

             ALK PHOS (test code = 208 U/L             H            



             9023660570)                                         

 

             ALTv (test code = 51 U/L       5-50         H            



             1742-6)                                             

 

             AST(SGOT) (test code = 18 U/L       13-40                     



             3006333688)                                         

 

             eGFR (test code =              mL/min/1.73m2              



             5298405252)                                         

 

             MAL (test code = MAL) Association of                           



                          Glomerular Filtration                           



                          Rate (GFR) and Staging                           



                          of Kidney Disease*                           



                          +---------------------                           



                          --+-------------------                           



                          --+-------------------                           



                          ------+| GFR                           



                          (mL/min/1.73 m2) ?|                           



                          With Kidney Damage ?|                           



                          ?Without Kidney                           



                          Damage+---------------                           



                          --------+-------------                           



                          --------+-------------                           



                          ------------+| ?>90 ?                           



                          ? ? ? ? ? ? ? ?|                           



                          ?Stage one ? ? ? ? ?|                           



                          ? Normal ? ? ? ? ? ? ?                           



                          ?+--------------------                           



                          ---+------------------                           



                          ---+------------------                           



                          -------+| ?60-89 ? ? ?                           



                          ? ? ? ? ?| ?Stage two                           



                          ? ? ? ? ?| ? Decreased                           



                          GFR ? ? ? ?                            



                          +---------------------                           



                          --+-------------------                           



                          --+-------------------                           



                          ------+| ?30-59 ? ? ?                           



                          ? ? ? ? ?| ?Stage                           



                          three ? ? ? ?| ? Stage                           



                          three ? ? ? ? ?                           



                          +---------------------                           



                          --+-------------------                           



                          --+-------------------                           



                          ------+| ?15-29 ? ? ?                           



                          ? ? ? ? ?| ?Stage four                           



                          ? ? ? ? | ? Stage four                           



                          ? ? ? ? ?                              



                          ?+--------------------                           



                          ---+------------------                           



                          ---+------------------                           



                          -------+| ?<15 (or                           



                          dialysis) ? ?| ?Stage                           



                          five ? ? ? ? | ? Stage                           



                          five ? ? ? ? ?                           



                          ?+--------------------                           



                          ---+------------------                           



                          ---+------------------                           



                          -------+ *Each stage                           



                          assumes the associated                           



                          GFR level has been in                           



                          effect for at least                           



                          three months. ?Stages                           



                          1 to 5, with or                           



                          without kidney                           



                          disease, indicate                           



                          chronic kidney                           



                          disease. Notes:                           



                          Determination of                           



                          stages one and two                           



                          (with eGFR                             



                          >59mL/min/1.73 m2)                           



                          requires estimation of                           



                          kidney damage for at                           



                          least three months as                           



                          defined by structural                           



                          or functional                           



                          abnormalities of the                           



                          kidney, manifested by                           



                          either:Pathological                           



                          abnormalities or                           



                          Markers of kidney                           



                          damage (including                           



                          abnormalities in the                           



                          composition of the                           



                          blood or urine or                           



                          abnormalities in                           



                          imaging tests).                           

 

             Lab Interpretation Abnormal                               



             (test code = 33418-8)                                        



[facility]





             Test Item    Value        Reference Range Interpretation Comments

 

             LIPASE (test code = 7673257800) 137 U/L      0-220                 

    

 

             Lab Interpretation (test code = Normal                             

    



             09491-4)                                            



[facility] WITH [id number] 04:07:59





             Test Item    Value        Reference Range Interpretation Comments

 

             WBC (test code =              See_Comment  H             [Automated



             6690-2)                                             message] The sy

stem



                                                                 which generated



                                                                 this result



                                                                 transmitted



                                                                 reference range

:



                                                                 4.20 - 10.70



                                                                 10*3/?L. The



                                                                 reference range

 was



                                                                 not used to



                                                                 interpret this



                                                                 result as



                                                                 normal/abnormal

.

 

             RBC (test code =              See_Comment  H             [Automated



             019-8)                                              message] The sy

stem



                                                                 which generated



                                                                 this result



                                                                 transmitted



                                                                 reference range

:



                                                                 4.26 - 5.52



                                                                 10*6/?L. The



                                                                 reference range

 was



                                                                 not used to



                                                                 interpret this



                                                                 result as



                                                                 normal/abnormal

.

 

             HGB (test code = 16.8 g/dL    12.2-16.4    H            



             718-7)                                              

 

             HCT (test code = 49.2 %       38.4-49.3                 



             4544-3)                                             

 

             MCV (test code = 86.2 fL      81.7-95.6                 



             787-2)                                              

 

             MCH (test code = 29.4 pg      26.1-32.7                 



             785-6)                                              

 

             MCHC (test code = 34.1 g/dL    31.2-35.0                 



             786-4)                                              

 

             RDW-SD (test code = 42.6 fL      38.5-51.6                 



             24253-7)                                            

 

             RDW-CV (test code = 13.6 %       12.1-15.4                 



             788-0)                                              

 

             PLT (test code =              See_Comment  H             [Automated



             777-3)                                              message] The sy

stem



                                                                 which generated



                                                                 this result



                                                                 transmitted



                                                                 reference range

:



                                                                 150 - 328 10*3/

?L.



                                                                 The reference r

zhen



                                                                 was not used to



                                                                 interpret this



                                                                 result as



                                                                 normal/abnormal

.

 

             MPV (test code = 10.7 fL      9.8-13.0                  



             38676-9)                                            

 

             NRBC/100 WBC (test              See_Comment                [Automat

ed



             code = 6856209516)                                        message] 

The system



                                                                 which generated



                                                                 this result



                                                                 transmitted



                                                                 reference range

:



                                                                 0.0 - 10.0 /100



                                                                 WBCs. The refer

ence



                                                                 range was not u

sed



                                                                 to interpret th

is



                                                                 result as



                                                                 normal/abnormal

.

 

             NRBC x10^3 (test code <0.01        See_Comment                [Auto

mated



             = [id number])                                        message] The s

ystem



                                                                 which generated



                                                                 this result



                                                                 transmitted



                                                                 reference range

:



                                                                 10*3/?L. The



                                                                 reference range

 was



                                                                 not used to



                                                                 interpret this



                                                                 result as



                                                                 normal/abnormal

.

 

             GRAN MAT (NEUT) % 74.2 %                                 



             (test code = 770-8)                                        

 

             IMM GRAN % (test code 0.50 %                                 



             = 7504930726)                                        

 

             LYMPH % (test code = 14.8 %                                 



             736-9)                                              

 

             MONO % (test code = 8.3 %                                  



             5905-5)                                             

 

             EOS % (test code = 1.9 %                                  



             713-8)                                              

 

             BASO % (test code = 0.3 %                                  



             706-2)                                              

 

             GRAN MAT x10^3(ANC) 8.01 10*3/uL 1.99-6.95    H            



             (test code =                                        



             9360301879)                                         

 

             IMM GRAN x10^3 (test 0.05 10*3/uL 0.00-0.06                 



             code = 8346982850)                                        

 

             LYMPH x10^3 (test code 1.59 10*3/uL 1.09-3.23                 



             = 731-0)                                            

 

             MONO x10^3 (test code 0.89 10*3/uL 0.36-1.02                 



             = 742-7)                                            

 

             EOS x10^3 (test code = 0.20 10*3/uL 0.06-0.53                 



             711-2)                                              

 

             BASO x10^3 (test code 0.03 10*3/uL 0.01-0.09                 



             = 704-7)                                            

 

             Lab Interpretation Abnormal                               



             (test code = 36265-8)                                        



[facility] GLUCOSE (AUTOMATED)2022-06-10 17:25:00





             Test Item    Value        Reference Range Interpretation Comments

 

             POCT GLU (test code = 8664442288) 249 mg/dL           H      

      

 

             Lab Interpretation (test code = Abnormal                           

    



             35838-6)                                            



[facility] GLUCOSE (AUTOMATED)2022-06-10 12:54:01





             Test Item    Value        Reference Range Interpretation Comments

 

             POCT GLU (test code = 2295743075) 188 mg/dL           H      

      

 

             Lab Interpretation (test code = Abnormal                           

    



             05602-7)                                            



[facility] METABOLIC PANEL (NA, K, CL, CO2, 
GLUCOSE, BUN, CREATININE, CA)2022-06-10 11:48:23





             Test Item    Value        Reference Range Interpretation Comments

 

             NA (test code = 137 mmol/L   135-145                   



             5971747151)                                         

 

             K (test code = 4.3 mmol/L   3.5-5.0                   



             6115772743)                                         

 

             CL (test code = 105 mmol/L                       



             7125798869)                                         

 

             CO2 TOTAL (test code = 23 mmol/L    23-31                     



             6873124196)                                         

 

             AGAP (test code =              2-16                      



             2548127766)                                         

 

             BUN (test code = 19 mg/dL     7-23                      



             6685148286)                                         

 

             GLUCOSE (test code = 243 mg/dL           H            



             8425324427)                                         

 

             CREATININE (test code = 0.50 mg/dL   0.60-1.25    L            



             4038283463)                                         

 

             CALCIUM (test code = 8.9 mg/dL    8.6-10.6                  



             7196356370)                                         

 

             eGFR (test code =              mL/min/1.73m2              



             6665780703)                                         

 

             MAL (test code = MAL) Association of                           



                          Glomerular Filtration                           



                          Rate (GFR) and Staging                           



                          of Kidney Disease*                           



                          +---------------------                           



                          --+-------------------                           



                          --+-------------------                           



                          ------+| GFR                           



                          (mL/min/1.73 m2) ?|                           



                          With Kidney Damage ?|                           



                          ?Without Kidney                           



                          Damage+---------------                           



                          --------+-------------                           



                          --------+-------------                           



                          ------------+| ?>90 ?                           



                          ? ? ? ? ? ? ? ?|                           



                          ?Stage one ? ? ? ? ?|                           



                          ? Normal ? ? ? ? ? ? ?                           



                          ?+--------------------                           



                          ---+------------------                           



                          ---+------------------                           



                          -------+| ?60-89 ? ? ?                           



                          ? ? ? ? ?| ?Stage two                           



                          ? ? ? ? ?| ? Decreased                           



                          GFR ? ? ? ?                            



                          +---------------------                           



                          --+-------------------                           



                          --+-------------------                           



                          ------+| ?30-59 ? ? ?                           



                          ? ? ? ? ?| ?Stage                           



                          three ? ? ? ?| ? Stage                           



                          three ? ? ? ? ?                           



                          +---------------------                           



                          --+-------------------                           



                          --+-------------------                           



                          ------+| ?15-29 ? ? ?                           



                          ? ? ? ? ?| ?Stage four                           



                          ? ? ? ? | ? Stage four                           



                          ? ? ? ? ?                              



                          ?+--------------------                           



                          ---+------------------                           



                          ---+------------------                           



                          -------+| ?<15 (or                           



                          dialysis) ? ?| ?Stage                           



                          five ? ? ? ? | ? Stage                           



                          five ? ? ? ? ?                           



                          ?+--------------------                           



                          ---+------------------                           



                          ---+------------------                           



                          -------+ *Each stage                           



                          assumes the associated                           



                          GFR level has been in                           



                          effect for at least                           



                          three months. ?Stages                           



                          1 to 5, with or                           



                          without kidney                           



                          disease, indicate                           



                          chronic kidney                           



                          disease. Notes:                           



                          Determination of                           



                          stages one and two                           



                          (with eGFR                             



                          >59mL/min/1.73 m2)                           



                          requires estimation of                           



                          kidney damage for at                           



                          least three months as                           



                          defined by structural                           



                          or functional                           



                          abnormalities of the                           



                          kidney, manifested by                           



                          either:Pathological                           



                          abnormalities or                           



                          Markers of kidney                           



                          damage (including                           



                          abnormalities in the                           



                          composition of the                           



                          blood or urine or                           



                          abnormalities in                           



                          imaging tests).                           

 

             Lab Interpretation Abnormal                               



             (test code = 89927-8)                                        



[facility]





             Test Item    Value        Reference Range Interpretation Comments

 

             MAGNESIUM (test code = 0308815347) 2.1 mg/dL    1.7-2.4            

       

 

             Lab Interpretation (test code = Normal                             

    



             35982-1)                                            



[facility] WITH DIFF2022-06-10 11:09:44





             Test Item    Value        Reference Range Interpretation Comments

 

             WBC (test code =              See_Comment                [Automated

 message]



             1690-2)                                             The system [employer]



                                                                 generated this 

result



                                                                 transmitted ref

erence



                                                                 range: 4.20 - 1

0.70



                                                                 10*3/?L. The re

ference



                                                                 range was not u

sed to



                                                                 interpret this 

result



                                                                 as normal/abnor

mal.

 

             RBC (test code =              See_Comment                [Automated

 message]



             789-8)                                              The system [employer]



                                                                 generated this 

result



                                                                 transmitted ref

erence



                                                                 range: 4.26 - 5

.52



                                                                 10*6/?L. The re

ference



                                                                 range was not u

sed to



                                                                 interpret this 

result



                                                                 as normal/abnor

mal.

 

             HGB (test code = 15.9 g/dL    12.2-16.4                 



             718-7)                                              

 

             HCT (test code = 46.8 %       38.4-49.3                 



             4544-3)                                             

 

             MCV (test code = 87.5 fL      81.7-95.6                 



             787-2)                                              

 

             MCH (test code = 29.7 pg      26.1-32.7                 



             785-6)                                              

 

             MCHC (test code = 34.0 g/dL    31.2-35.0                 



             786-4)                                              

 

             RDW-SD (test code 43.6 fL      38.5-51.6                 



             = 51821-7)                                          

 

             RDW-CV (test code 13.7 %       12.1-15.4                 



             = 788-0)                                            

 

             PLT (test code =              See_Comment                [Automated

 message]



             777-3)                                              The system whic

h



                                                                 generated this 

result



                                                                 transmitted ref

erence



                                                                 range: 150 - 32

8



                                                                 10*3/?L. The re

ference



                                                                 range was not u

sed to



                                                                 interpret this 

result



                                                                 as normal/abnor

mal.

 

             MPV (test code = 11.0 fL      9.8-13.0                  



             80915-9)                                            

 

             NRBC/100 WBC (test              See_Comment                [Automat

ed message]



             code = 6240413959)                                        The syste

m which



                                                                 generated this 

result



                                                                 transmitted ref

erence



                                                                 range: 0.0 - 10

.0 /100



                                                                 WBCs. The refer

ence



                                                                 range was not u

sed to



                                                                 interpret this 

result



                                                                 as normal/abnor

mal.

 

             NRBC x10^3 (test <0.01        See_Comment                [Automated

 message]



             code = 0245948699)                                        The syste

m which



                                                                 generated this 

result



                                                                 transmitted ref

erence



                                                                 range: 10*3/?L.

 The



                                                                 reference range

 was not



                                                                 used to interpr

et this



                                                                 result as



                                                                 normal/abnormal

.

 

             GRAN MAT (NEUT) % 57.9 %                                 



             (test code =                                        



             770-8)                                              

 

             IMM GRAN % (test 0.60 %                                 



             code = 0363503476)                                        

 

             LYMPH % (test code 30.7 %                                 



             = 736-9)                                            

 

             MONO % (test code 8.5 %                                  



             = 5905-5)                                           

 

             EOS % (test code = 2.0 %                                  



             713-8)                                              

 

             BASO % (test code 0.3 %                                  



             = 706-2)                                            

 

             GRAN MAT     5.14 10*3/uL 1.99-6.95                 



             x10^3(ANC) (test                                        



             code = 7629259204)                                        

 

             IMM GRAN x10^3 0.05 10*3/uL 0.00-0.06                 



             (test code =                                        



             3381407168)                                         

 

             LYMPH x10^3 (test 2.73 10*3/uL 1.09-3.23                 



             code = 731-0)                                        

 

             MONO x10^3 (test 0.76 10*3/uL 0.36-1.02                 



             code = 742-7)                                        

 

             EOS x10^3 (test 0.18 10*3/uL 0.06-0.53                 



             code = 711-2)                                        

 

             BASO x10^3 (test 0.03 10*3/uL 0.01-0.09                 



             code = 704-7)                                        



[facility] GLUCOSE (AUTOMATED)2022-06-10 10:34:14





             Test Item    Value        Reference Range Interpretation Comments

 

             POCT GLU (test code = 6338938267) 230 mg/dL           H      

      

 

             Lab Interpretation (test code = Abnormal                           

    



             74128-6)                                            



[facility] GLUCOSE (AUTOMATED)2022-06-10 05:56:50





             Test Item    Value        Reference Range Interpretation Comments

 

             POCT GLU (test code = 7339476596) 314 mg/dL           H      

      

 

             Lab Interpretation (test code = Abnormal                           

    



             48267-2)                                            



[facility] GLUCOSE (AUTOMATED)2022-06-10 04:40:22





             Test Item    Value        Reference Range Interpretation Comments

 

             POCT GLU (test code = 5961967834) 324 mg/dL           H      

      

 

             Lab Interpretation (test code = Abnormal                           

    



             49736-0)                                            



[facility] GLUCOSE (AUTOMATED)2022-06-10 02:37:33





             Test Item    Value        Reference Range Interpretation Comments

 

             POCT GLU (test code = 8785668404) 296 mg/dL           H      

      

 

             Lab Interpretation (test code = Abnormal                           

    



             76528-8)                                            



[facility] GLUCOSE (AUTOMATED)2022-06-10 01:05:02





             Test Item    Value        Reference Range Interpretation Comments

 

             POCT GLU (test code = 6174080534) 319 mg/dL           H      

      

 

             Lab Interpretation (test code = Abnormal                           

    



             06795-4)                                            



[facility] GLUCOSE (AUTOMATED)2022 21:59:09





             Test Item    Value        Reference Range Interpretation Comments

 

             POCT GLU (test code = 3911584330) 257 mg/dL           H      

      

 

             Lab Interpretation (test code = Abnormal                           

    



             60264-2)                                            



[facility] GLUCOSE (AUTOMATED)2022 17:21:41





             Test Item    Value        Reference Range Interpretation Comments

 

             POCT GLU (test code = 6796123887) 214 mg/dL           H      

      

 

             Lab Interpretation (test code = Abnormal                           

    



             28979-3)                                            



[facility] GLUCOSE (AUTOMATED)2022 13:04:04





             Test Item    Value        Reference Range Interpretation Comments

 

             POCT GLU (test code = 5910630787) 234 mg/dL           H      

      

 

             Lab Interpretation (test code = Abnormal                           

    



             15990-6)                                            



[facility] METABOLIC PANEL (NA, K, CL, CO2, 
GLUCOSE, BUN, CREATININE, CA)2022 12:23:33





             Test Item    Value        Reference Range Interpretation Comments

 

             NA (test code = 136 mmol/L   135-145                   



             0852060325)                                         

 

             K (test code = 4.1 mmol/L   3.5-5.0                   



             4375858193)                                         

 

             CL (test code = 109 mmol/L          H            



             5116183336)                                         

 

             CO2 TOTAL (test code = 20 mmol/L    23-31        L            



             5254452680)                                         

 

             AGAP (test code =              2-16                      



             5276243258)                                         

 

             BUN (test code = 16 mg/dL     7-23                      



             3208283164)                                         

 

             GLUCOSE (test code = 281 mg/dL           H            



             5893804121)                                         

 

             CREATININE (test code = 0.50 mg/dL   0.60-1.25    L            



             7971531055)                                         

 

             CALCIUM (test code = 8.3 mg/dL    8.6-10.6     L            



             7938071277)                                         

 

             eGFR (test code =              mL/min/1.73m2              



             0683953154)                                         

 

             MAL (test code = MAL) Association of                           



                          Glomerular Filtration                           



                          Rate (GFR) and Staging                           



                          of Kidney Disease*                           



                          +---------------------                           



                          --+-------------------                           



                          --+-------------------                           



                          ------+| GFR                           



                          (mL/min/1.73 m2) ?|                           



                          With Kidney Damage ?|                           



                          ?Without Kidney                           



                          Damage+---------------                           



                          --------+-------------                           



                          --------+-------------                           



                          ------------+| ?>90 ?                           



                          ? ? ? ? ? ? ? ?|                           



                          ?Stage one ? ? ? ? ?|                           



                          ? Normal ? ? ? ? ? ? ?                           



                          ?+--------------------                           



                          ---+------------------                           



                          ---+------------------                           



                          -------+| ?60-89 ? ? ?                           



                          ? ? ? ? ?| ?Stage two                           



                          ? ? ? ? ?| ? Decreased                           



                          GFR ? ? ? ?                            



                          +---------------------                           



                          --+-------------------                           



                          --+-------------------                           



                          ------+| ?30-59 ? ? ?                           



                          ? ? ? ? ?| ?Stage                           



                          three ? ? ? ?| ? Stage                           



                          three ? ? ? ? ?                           



                          +---------------------                           



                          --+-------------------                           



                          --+-------------------                           



                          ------+| ?15-29 ? ? ?                           



                          ? ? ? ? ?| ?Stage four                           



                          ? ? ? ? | ? Stage four                           



                          ? ? ? ? ?                              



                          ?+--------------------                           



                          ---+------------------                           



                          ---+------------------                           



                          -------+| ?<15 (or                           



                          dialysis) ? ?| ?Stage                           



                          five ? ? ? ? | ? Stage                           



                          five ? ? ? ? ?                           



                          ?+--------------------                           



                          ---+------------------                           



                          ---+------------------                           



                          -------+ *Each stage                           



                          assumes the associated                           



                          GFR level has been in                           



                          effect for at least                           



                          three months. ?Stages                           



                          1 to 5, with or                           



                          without kidney                           



                          disease, indicate                           



                          chronic kidney                           



                          disease. Notes:                           



                          Determination of                           



                          stages one and two                           



                          (with eGFR                             



                          >59mL/min/1.73 m2)                           



                          requires estimation of                           



                          kidney damage for at                           



                          least three months as                           



                          defined by structural                           



                          or functional                           



                          abnormalities of the                           



                          kidney, manifested by                           



                          either:Pathological                           



                          abnormalities or                           



                          Markers of kidney                           



                          damage (including                           



                          abnormalities in the                           



                          composition of the                           



                          blood or urine or                           



                          abnormalities in                           



                          imaging tests).                           

 

             Lab Interpretation Abnormal                               



             (test code = 14718-7)                                        



[facility]





             Test Item    Value        Reference Range Interpretation Comments

 

             MAGNESIUM (test code = 2205833652) 1.6 mg/dL    1.7-2.4      L     

       

 

             Lab Interpretation (test code = Abnormal                           

    



             04471-2)                                            



[facility]





             Test Item    Value        Reference Range Interpretation Comments

 

             PHOSPHORUS (test code = 0344588006) 3.2 mg/dL    2.5-5.0           

        

 

             Lab Interpretation (test code = Normal                             

    



             70993-3)                                            



[facility] 
10:04:37





             Test Item    Value        Reference Range Interpretation Comments

 

             HGB A1C (test code = 9.6 %        4.0-5.7      H            



             4548-4)                                             

 

             MAL (test code = MAL) Reference                              



                          RangesNormal:                           



                          <5.7%Prediabetes:                           



                          5.7 - 6.4%Diabetes:                           



                          > 6.5%                                 

 

             Lab Interpretation (test Abnormal                               



             code = 87661-3)                                        



[facility]41





             Test Item    Value        Reference Range Interpretation Comments

 

             WBC (test code =              See_Comment                [Automated

 message]



             6690-2)                                             The system [employer]



                                                                 generated this 

result



                                                                 transmitted ref

erence



                                                                 range: 4.20 - 1

0.70



                                                                 10*3/?L. The re

ference



                                                                 range was not u

sed to



                                                                 interpret this 

result



                                                                 as normal/abnor

mal.

 

             RBC (test code =              See_Comment                [Automated

 message]



             789-8)                                              The system [employer]



                                                                 generated this 

result



                                                                 transmitted ref

erence



                                                                 range: 4.26 - 5

.52



                                                                 10*6/?L. The re

ference



                                                                 range was not u

sed to



                                                                 interpret this 

result



                                                                 as normal/abnor

mal.

 

             HGB (test code = 14.7 g/dL    12.2-16.4                 



             718-7)                                              

 

             HCT (test code = 43.3 %       38.4-49.3                 



             4544-3)                                             

 

             MCV (test code = 86.9 fL      81.7-95.6                 



             787-2)                                              

 

             MCH (test code = 29.5 pg      26.1-32.7                 



             785-6)                                              

 

             MCHC (test code = 33.9 g/dL    31.2-35.0                 



             786-4)                                              

 

             RDW-SD (test code 42.5 fL      38.5-51.6                 



             = 84929-9)                                          

 

             RDW-CV (test code 13.5 %       12.1-15.4                 



             = 788-0)                                            

 

             PLT (test code =              See_Comment                [Automated

 message]



             777-3)                                              The system whic

h



                                                                 generated this 

result



                                                                 transmitted ref

erence



                                                                 range: 150 - 32

8



                                                                 10*3/?L. The re

ference



                                                                 range was not u

sed to



                                                                 interpret this 

result



                                                                 as normal/abnor

mal.

 

             MPV (test code = 10.8 fL      9.8-13.0                  



             68512-3)                                            

 

             NRBC/100 WBC (test              See_Comment                [Automat

ed message]



             code = 8363808682)                                        The syste

m which



                                                                 generated this 

result



                                                                 transmitted ref

erence



                                                                 range: 0.0 - 10

.0 /100



                                                                 WBCs. The refer

ence



                                                                 range was not u

sed to



                                                                 interpret this 

result



                                                                 as normal/abnor

mal.

 

             NRBC x10^3 (test <0.01        See_Comment                [Automated

 message]



             code = 1126847267)                                        The syste

m which



                                                                 generated this 

result



                                                                 transmitted ref

erence



                                                                 range: 10*3/?L.

 The



                                                                 reference range

 was not



                                                                 used to interpr

et this



                                                                 result as



                                                                 normal/abnormal

.

 

             GRAN MAT (NEUT) % 58.6 %                                 



             (test code =                                        



             770-8)                                              

 

             IMM GRAN % (test 0.50 %                                 



             code = 0312875048)                                        

 

             LYMPH % (test code 31.3 %                                 



             = 736-9)                                            

 

             MONO % (test code 6.9 %                                  



             = 5905-5)                                           

 

             EOS % (test code = 2.4 %                                  



             713-8)                                              

 

             BASO % (test code 0.3 %                                  



             = 706-2)                                            

 

             GRAN MAT     5.39 10*3/uL 1.99-6.95                 



             x10^3(ANC) (test                                        



             code = 2472496594)                                        

 

             IMM GRAN x10^3 0.05 10*3/uL 0.00-0.06                 



             (test code =                                        



             4175272905)                                         

 

             LYMPH x10^3 (test 2.89 10*3/uL 1.09-3.23                 



             code = 731-0)                                        

 

             MONO x10^3 (test 0.64 10*3/uL 0.36-1.02                 



             code = 742-7)                                        

 

             EOS x10^3 (test 0.22 10*3/uL 0.06-0.53                 



             code = 711-2)                                        

 

             BASO x10^3 (test 0.03 10*3/uL 0.01-0.09                 



             code = 704-7)                                        



[facility] GLUCOSE (AUTOMATED)2022 09:06:33





             Test Item    Value        Reference Range Interpretation Comments

 

             POCT GLU (test code = 8047091359) 206 mg/dL           H      

      

 

             Lab Interpretation (test code = Abnormal                           

    



             63516-5)                                            



[facility] GLUCOSE (AUTOMATED)2022 04:38:41





             Test Item    Value        Reference Range Interpretation Comments

 

             POCT GLU (test code = 5889606105) 272 mg/dL           H      

      

 

             Lab Interpretation (test code = Abnormal                           

    



             76225-9)                                            



[facility] GLUCOSE (AUTOMATED)2022 01:14:47





             Test Item    Value        Reference Range Interpretation Comments

 

             POCT GLU (test code = 9068345336) 256 mg/dL           H      

      

 

             Lab Interpretation (test code = Abnormal                           

    



             16215-7)                                            



[facility] GLUCOSE (AUTOMATED)2022 21:11:19





             Test Item    Value        Reference Range Interpretation Comments

 

             POCT GLU (test code = 5294339047) 254 mg/dL           H      

      

 

             Lab Interpretation (test code = Abnormal                           

    



             92504-9)                                            



[facility] GLUCOSE (AUTOMATED)2022 17:10:33





             Test Item    Value        Reference Range Interpretation Comments

 

             POCT GLU (test code = 3345718616) 350 mg/dL           H      

      

 

             Lab Interpretation (test code = Abnormal                           

    



             15803-1)                                            



[facility] GLUCOSE (AUTOMATED)2022 12:50:18





             Test Item    Value        Reference Range Interpretation Comments

 

             POCT GLU (test code = 2676766733) 269 mg/dL           H      

      

 

             Lab Interpretation (test code = Abnormal                           

    



             30320-8)                                            



[facility] Metabolic Panel (NA, K, CL, CO2, 
GLUCOSE, BUN, CREATININE, CA)2022 10:18:27





             Test Item    Value        Reference Range Interpretation Comments

 

             NA (test code = 137 mmol/L   135-145                   



             4975534636)                                         

 

             K (test code = 4.0 mmol/L   3.5-5.0                   



             7024652143)                                         

 

             CL (test code = 108 mmol/L                       



             3378804586)                                         

 

             CO2 TOTAL (test code = 23 mmol/L    23-31                     



             9413660276)                                         

 

             AGAP (test code =              2-16                      



             7388536985)                                         

 

             BUN (test code = 16 mg/dL     7-23                      



             7700559879)                                         

 

             GLUCOSE (test code = 386 mg/dL           H            



             5753255923)                                         

 

             CREATININE (test code = 0.70 mg/dL   0.60-1.25                 



             8477109921)                                         

 

             CALCIUM (test code = 8.6 mg/dL    8.6-10.6                  



             5986097400)                                         

 

             eGFR (test code =              mL/min/1.73m2              



             1300593351)                                         

 

             MAL (test code = MAL) Association of                           



                          Glomerular Filtration                           



                          Rate (GFR) and Staging                           



                          of Kidney Disease*                           



                          +---------------------                           



                          --+-------------------                           



                          --+-------------------                           



                          ------+| GFR                           



                          (mL/min/1.73 m2) ?|                           



                          With Kidney Damage ?|                           



                          ?Without Kidney                           



                          Damage+---------------                           



                          --------+-------------                           



                          --------+-------------                           



                          ------------+| ?>90 ?                           



                          ? ? ? ? ? ? ? ?|                           



                          ?Stage one ? ? ? ? ?|                           



                          ? Normal ? ? ? ? ? ? ?                           



                          ?+--------------------                           



                          ---+------------------                           



                          ---+------------------                           



                          -------+| ?60-89 ? ? ?                           



                          ? ? ? ? ?| ?Stage two                           



                          ? ? ? ? ?| ? Decreased                           



                          GFR ? ? ? ?                            



                          +---------------------                           



                          --+-------------------                           



                          --+-------------------                           



                          ------+| ?30-59 ? ? ?                           



                          ? ? ? ? ?| ?Stage                           



                          three ? ? ? ?| ? Stage                           



                          three ? ? ? ? ?                           



                          +---------------------                           



                          --+-------------------                           



                          --+-------------------                           



                          ------+| ?15-29 ? ? ?                           



                          ? ? ? ? ?| ?Stage four                           



                          ? ? ? ? | ? Stage four                           



                          ? ? ? ? ?                              



                          ?+--------------------                           



                          ---+------------------                           



                          ---+------------------                           



                          -------+| ?<15 (or                           



                          dialysis) ? ?| ?Stage                           



                          five ? ? ? ? | ? Stage                           



                          five ? ? ? ? ?                           



                          ?+--------------------                           



                          ---+------------------                           



                          ---+------------------                           



                          -------+ *Each stage                           



                          assumes the associated                           



                          GFR level has been in                           



                          effect for at least                           



                          three months. ?Stages                           



                          1 to 5, with or                           



                          without kidney                           



                          disease, indicate                           



                          chronic kidney                           



                          disease. Notes:                           



                          Determination of                           



                          stages one and two                           



                          (with eGFR                             



                          >59mL/min/1.73 m2)                           



                          requires estimation of                           



                          kidney damage for at                           



                          least three months as                           



                          defined by structural                           



                          or functional                           



                          abnormalities of the                           



                          kidney, manifested by                           



                          either:Pathological                           



                          abnormalities or                           



                          Markers of kidney                           



                          damage (including                           



                          abnormalities in the                           



                          composition of the                           



                          blood or urine or                           



                          abnormalities in                           



                          imaging tests).                           

 

             Lab Interpretation Abnormal                               



             (test code = 94800-5)                                        



[facility] with [id number] 10:03:31





             Test Item    Value        Reference Range Interpretation Comments

 

             WBC (test code =              See_Comment                [Automated

 message]



             6690-2)                                             The system [employer]



                                                                 generated this 

result



                                                                 transmitted ref

erence



                                                                 range: 4.20 - 1

0.70



                                                                 10*3/?L. The re

ference



                                                                 range was not u

sed to



                                                                 interpret this 

result



                                                                 as normal/abnor

mal.

 

             RBC (test code =              See_Comment                [Automated

 message]



             949-8)                                              The system [employer]



                                                                 generated this 

result



                                                                 transmitted ref

erence



                                                                 range: 4.26 - 5

.52



                                                                 10*6/?L. The re

ference



                                                                 range was not u

sed to



                                                                 interpret this 

result



                                                                 as normal/abnor

mal.

 

             HGB (test code = 15.5 g/dL    12.2-16.4                 



             718-7)                                              

 

             HCT (test code = 45.0 %       38.4-49.3                 



             4544-3)                                             

 

             MCV (test code = 85.9 fL      81.7-95.6                 



             787-2)                                              

 

             MCH (test code = 29.6 pg      26.1-32.7                 



             785-6)                                              

 

             MCHC (test code = 34.4 g/dL    31.2-35.0                 



             786-4)                                              

 

             RDW-SD (test code 42.5 fL      38.5-51.6                 



             = 23685-7)                                          

 

             RDW-CV (test code 13.5 %       12.1-15.4                 



             = 788-0)                                            

 

             PLT (test code =              See_Comment                [Automated

 message]



             777-3)                                              The system [employer]



                                                                 generated this 

result



                                                                 transmitted ref

erence



                                                                 range: 150 - 32

8



                                                                 10*3/?L. The re

ference



                                                                 range was not u

sed to



                                                                 interpret this 

result



                                                                 as normal/abnor

mal.

 

             MPV (test code = 11.2 fL      9.8-13.0                  



             33365-1)                                            

 

             NRBC/100 WBC (test              See_Comment                [Automat

ed message]



             code = 3913333340)                                        The syste

[employer] which



                                                                 generated this 

result



                                                                 transmitted ref

erence



                                                                 range: 0.0 - 10

.0 /100



                                                                 WBCs. The refer

ence



                                                                 range was not u

sed to



                                                                 interpret this 

result



                                                                 as normal/abnor

mal.

 

             NRBC x10^3 (test <0.01        See_Comment                [Automated

 message]



             code = 4609811540)                                        The syste

m which



                                                                 generated this 

result



                                                                 transmitted ref

erence



                                                                 range: 10*3/?L.

 The



                                                                 reference range

 was not



                                                                 used to interpr

et this



                                                                 result as



                                                                 normal/abnormal

.

 

             GRAN MAT (NEUT) % 63.5 %                                 



             (test code =                                        



             770-8)                                              

 

             IMM GRAN % (test 0.30 %                                 



             code = 0286215037)                                        

 

             LYMPH % (test code 24.8 %                                 



             = 736-9)                                            

 

             MONO % (test code 8.7 %                                  



             = 5905-5)                                           

 

             EOS % (test code = 2.5 %                                  



             713-8)                                              

 

             BASO % (test code 0.2 %                                  



             = 706-2)                                            

 

             GRAN MAT     6.05 10*3/uL 1.99-6.95                 



             x10^3(ANC) (test                                        



             code = 6261660315)                                        

 

             IMM GRAN x10^3 0.03 10*3/uL 0.00-0.06                 



             (test code =                                        



             9105423711)                                         

 

             LYMPH x10^3 (test 2.37 10*3/uL 1.09-3.23                 



             code = 731-0)                                        

 

             MONO x10^3 (test 0.83 10*3/uL 0.36-1.02                 



             code = 742-7)                                        

 

             EOS x10^3 (test 0.24 10*3/uL 0.06-0.53                 



             code = 711-2)                                        

 

             BASO x10^3 (test <0.03        0.01-0.09                 



             code = 704-7)                                        



[facility] GLUCOSE (AUTOMATED)2022 09:00:15





             Test Item    Value        Reference Range Interpretation Comments

 

             POCT GLU (test code = 5584065099) 402 mg/dL           H      

      

 

             Lab Interpretation (test code = Abnormal                           

    



             73811-7)                                            



[facility] GLUCOSE (AUTOMATED)2022 04:54:36





             Test Item    Value        Reference Range Interpretation Comments

 

             POCT GLU (test code = 6152920344) 368 mg/dL           H      

      

 

             Lab Interpretation (test code = Abnormal                           

    



             46226-6)                                            



[facility] GLUCOSE (AUTOMATED)2022 03:13:40





             Test Item    Value        Reference Range Interpretation Comments

 

             POCT GLU (test code = 3063520005) 438 mg/dL           H      

      

 

             Lab Interpretation (test code = Abnormal                           

    



             88861-4)                                            



[facility]. METABOLIC PANEL (20655)2022 
01:05:39





             Test Item    Value        Reference Range Interpretation Comments

 

             NA (test code = 133 mmol/L   135-145      L            



             8788609873)                                         

 

             K (test code = 4.7 mmol/L   3.5-5.0                   



             2750660205)                                         

 

             CL (test code = 99 mmol/L                        



             7152282325)                                         

 

             CO2 TOTAL (test code = 21 mmol/L    23-31        L            



             2092519844)                                         

 

             AGAP (test code =              2-16                      



             6023189042)                                         

 

             BUN (test code = 18 mg/dL     7-23                      



             0651342570)                                         

 

             GLUCOSE (test code = 660 mg/dL           HH           



             3181470657)                                         

 

             CREATININE (test code = 0.64 mg/dL   0.60-1.25                 



             1121042881)                                         

 

             TOTAL BILI (test code = 1.0 mg/dL    0.1-1.1                   



             8690659349)                                         

 

             CALCIUM (test code = 9.7 mg/dL    8.6-10.6                  



             9739889373)                                         

 

             T PROTEIN (test code = 7.9 g/dL     6.3-8.2                   



             5588977932)                                         

 

             ALBUMIN (test code = 4.4 g/dL     3.5-5.0                   



             2396970233)                                         

 

             ALK PHOS (test code = 143 U/L             H            



             5684280958)                                         

 

             ALTv (test code = 47 U/L       5-50                      



             1742-6)                                             

 

             AST(SGOT) (test code = 30 U/L       13-40                     



             9041430050)                                         

 

             eGFR (test code =              mL/min/1.73m2              



             7296892339)                                         

 

             MAL (test code = MAL) Association of                           



                          Glomerular Filtration                           



                          Rate (GFR) and Staging                           



                          of Kidney Disease*                           



                          +---------------------                           



                          --+-------------------                           



                          --+-------------------                           



                          ------+| GFR                           



                          (mL/min/1.73 m2) ?|                           



                          With Kidney Damage ?|                           



                          ?Without Kidney                           



                          Damage+---------------                           



                          --------+-------------                           



                          --------+-------------                           



                          ------------+| ?>90 ?                           



                          ? ? ? ? ? ? ? ?|                           



                          ?Stage one ? ? ? ? ?|                           



                          ? Normal ? ? ? ? ? ? ?                           



                          ?+--------------------                           



                          ---+------------------                           



                          ---+------------------                           



                          -------+| ?60-89 ? ? ?                           



                          ? ? ? ? ?| ?Stage two                           



                          ? ? ? ? ?| ? Decreased                           



                          GFR ? ? ? ?                            



                          +---------------------                           



                          --+-------------------                           



                          --+-------------------                           



                          ------+| ?30-59 ? ? ?                           



                          ? ? ? ? ?| ?Stage                           



                          three ? ? ? ?| ? Stage                           



                          three ? ? ? ? ?                           



                          +---------------------                           



                          --+-------------------                           



                          --+-------------------                           



                          ------+| ?15-29 ? ? ?                           



                          ? ? ? ? ?| ?Stage four                           



                          ? ? ? ? | ? Stage four                           



                          ? ? ? ? ?                              



                          ?+--------------------                           



                          ---+------------------                           



                          ---+------------------                           



                          -------+| ?<15 (or                           



                          dialysis) ? ?| ?Stage                           



                          five ? ? ? ? | ? Stage                           



                          five ? ? ? ? ?                           



                          ?+--------------------                           



                          ---+------------------                           



                          ---+------------------                           



                          -------+ *Each stage                           



                          assumes the associated                           



                          GFR level has been in                           



                          effect for at least                           



                          three months. ?Stages                           



                          1 to 5, with or                           



                          without kidney                           



                          disease, indicate                           



                          chronic kidney                           



                          disease. Notes:                           



                          Determination of                           



                          stages one and two                           



                          (with eGFR                             



                          >59mL/min/1.73 m2)                           



                          requires estimation of                           



                          kidney damage for at                           



                          least three months as                           



                          defined by structural                           



                          or functional                           



                          abnormalities of the                           



                          kidney, manifested by                           



                          either:Pathological                           



                          abnormalities or                           



                          Markers of kidney                           



                          damage (including                           



                          abnormalities in the                           



                          composition of the                           



                          blood or urine or                           



                          abnormalities in                           



                          imaging tests).                           

 

             Lab Interpretation Abnormal                               



             (test code = 05245-1)                                        



[facility]





             Test Item    Value        Reference Range Interpretation Comments

 

             LIPASE (test code = 7492684980) 255 U/L      0-220        H        

    

 

             Lab Interpretation (test code = Abnormal                           

    



             88928-2)                                            



[facility] WITH [id number] 00:38:14





             Test Item    Value        Reference Range Interpretation Comments

 

             WBC (test code =              See_Comment  H             [Automated



             5990-2)                                             message] The sy

stem



                                                                 which generated



                                                                 this result



                                                                 transmitted



                                                                 reference range

:



                                                                 4.20 - 10.70



                                                                 10*3/?L. The



                                                                 reference range

 was



                                                                 not used to



                                                                 interpret this



                                                                 result as



                                                                 normal/abnormal

.

 

             RBC (test code =              See_Comment  H             [Automated



             489-8)                                              message] The sy

stem



                                                                 which generated



                                                                 this result



                                                                 transmitted



                                                                 reference range

:



                                                                 4.26 - 5.52



                                                                 10*6/?L. The



                                                                 reference range

 was



                                                                 not used to



                                                                 interpret this



                                                                 result as



                                                                 normal/abnormal

.

 

             HGB (test code = 16.5 g/dL    12.2-16.4    H            



             718-7)                                              

 

             HCT (test code = 48.9 %       38.4-49.3                 



             4544-3)                                             

 

             MCV (test code = 87.2 fL      81.7-95.6                 



             787-2)                                              

 

             MCH (test code = 29.4 pg      26.1-32.7                 



             785-6)                                              

 

             MCHC (test code = 33.7 g/dL    31.2-35.0                 



             786-4)                                              

 

             RDW-SD (test code = 43.8 fL      38.5-51.6                 



             49427-9)                                            

 

             RDW-CV (test code = 13.6 %       12.1-15.4                 



             788-0)                                              

 

             PLT (test code =              See_Comment                [Automated



             777-3)                                              message] The sy

stem



                                                                 which generated



                                                                 this result



                                                                 transmitted



                                                                 reference range

:



                                                                 150 - 328 10*3/

?L.



                                                                 The reference r

zhen



                                                                 was not used to



                                                                 interpret this



                                                                 result as



                                                                 normal/abnormal

.

 

             MPV (test code = 11.4 fL      9.8-13.0                  



             72050-2)                                            

 

             NRBC/100 WBC (test              See_Comment                [Automat

ed



             code = 8409928744)                                        message] 

The system



                                                                 which generated



                                                                 this result



                                                                 transmitted



                                                                 reference range

:



                                                                 0.0 - 10.0 /100



                                                                 WBCs. The refer

ence



                                                                 range was not u

sed



                                                                 to interpret th

is



                                                                 result as



                                                                 normal/abnormal

.

 

             NRBC x10^3 (test code <0.01        See_Comment                [Auto

mated



             = [id number])                                        message] The s

ystem



                                                                 which generated



                                                                 this result



                                                                 transmitted



                                                                 reference range

:



                                                                 10*3/?L. The



                                                                 reference range

 was



                                                                 not used to



                                                                 interpret this



                                                                 result as



                                                                 normal/abnormal

.

 

             GRAN MAT (NEUT) % 76.0 %                                 



             (test code = 770-8)                                        

 

             IMM GRAN % (test code 0.50 %                                 



             = 9867732590)                                        

 

             LYMPH % (test code = 15.2 %                                 



             736-9)                                              

 

             MONO % (test code = 6.5 %                                  



             5905-5)                                             

 

             EOS % (test code = 1.6 %                                  



             713-8)                                              

 

             BASO % (test code = 0.2 %                                  



             706-2)                                              

 

             GRAN MAT x10^3(ANC) 8.55 10*3/uL 1.99-6.95    H            



             (test code =                                        



             8627371256)                                         

 

             IMM GRAN x10^3 (test 0.06 10*3/uL 0.00-0.06                 



             code = 7913036353)                                        

 

             LYMPH x10^3 (test code 1.71 10*3/uL 1.09-3.23                 



             = 731-0)                                            

 

             MONO x10^3 (test code 0.73 10*3/uL 0.36-1.02                 



             = 742-7)                                            

 

             EOS x10^3 (test code = 0.18 10*3/uL 0.06-0.53                 



             711-2)                                              

 

             BASO x10^3 (test code <0.03        0.01-0.09                 



             = 704-7)                                            

 

             Lab Interpretation Abnormal                               



             (test code = 19656-9)                                        



[facility]





             Test Item    Value        Reference Range Interpretation Comments

 

             MAGNESIUM (test code = 1272368234) 1.7 mg/dL    1.7-2.4            

       

 

             Lab Interpretation (test code = Normal                             

    



             71688-1)                                            



[facility] METABOLIC PANEL (NA, K, CL, CO2, 
GLUCOSE, BUN, CREATININE, CA)2022 18:32:38





             Test Item    Value        Reference Range Interpretation Comments

 

             NA (test code = 137 mmol/L   135-145                   



             8488147119)                                         

 

             K (test code = 4.3 mmol/L   3.5-5.0                   



             4336379001)                                         

 

             CL (test code = 105 mmol/L                       



             9082303389)                                         

 

             CO2 TOTAL (test code = 23 mmol/L    23-31                     



             8557376864)                                         

 

             AGAP (test code =              2-16                      



             6909967170)                                         

 

             BUN (test code = 17 mg/dL     7-23                      



             8714023467)                                         

 

             GLUCOSE (test code = 317 mg/dL           H            



             6794435364)                                         

 

             CREATININE (test code = 0.57 mg/dL   0.60-1.25    L            



             9976077577)                                         

 

             CALCIUM (test code = 9.3 mg/dL    8.6-10.6                  



             9020378216)                                         

 

             eGFR (test code =              mL/min/1.73m2              



             1808779520)                                         

 

             MAL (test code = MAL) Association of                           



                          Glomerular Filtration                           



                          Rate (GFR) and Staging                           



                          of Kidney Disease*                           



                          +---------------------                           



                          --+-------------------                           



                          --+-------------------                           



                          ------+| GFR                           



                          (mL/min/1.73 m2) ?|                           



                          With Kidney Damage ?|                           



                          ?Without Kidney                           



                          Damage+---------------                           



                          --------+-------------                           



                          --------+-------------                           



                          ------------+| ?>90 ?                           



                          ? ? ? ? ? ? ? ?|                           



                          ?Stage one ? ? ? ? ?|                           



                          ? Normal ? ? ? ? ? ? ?                           



                          ?+--------------------                           



                          ---+------------------                           



                          ---+------------------                           



                          -------+| ?60-89 ? ? ?                           



                          ? ? ? ? ?| ?Stage two                           



                          ? ? ? ? ?| ? Decreased                           



                          GFR ? ? ? ?                            



                          +---------------------                           



                          --+-------------------                           



                          --+-------------------                           



                          ------+| ?30-59 ? ? ?                           



                          ? ? ? ? ?| ?Stage                           



                          three ? ? ? ?| ? Stage                           



                          three ? ? ? ? ?                           



                          +---------------------                           



                          --+-------------------                           



                          --+-------------------                           



                          ------+| ?15-29 ? ? ?                           



                          ? ? ? ? ?| ?Stage four                           



                          ? ? ? ? | ? Stage four                           



                          ? ? ? ? ?                              



                          ?+--------------------                           



                          ---+------------------                           



                          ---+------------------                           



                          -------+| ?<15 (or                           



                          dialysis) ? ?| ?Stage                           



                          five ? ? ? ? | ? Stage                           



                          five ? ? ? ? ?                           



                          ?+--------------------                           



                          ---+------------------                           



                          ---+------------------                           



                          -------+ *Each stage                           



                          assumes the associated                           



                          GFR level has been in                           



                          effect for at least                           



                          three months. ?Stages                           



                          1 to 5, with or                           



                          without kidney                           



                          disease, indicate                           



                          chronic kidney                           



                          disease. Notes:                           



                          Determination of                           



                          stages one and two                           



                          (with eGFR                             



                          >59mL/min/1.73 m2)                           



                          requires estimation of                           



                          kidney damage for at                           



                          least three months as                           



                          defined by structural                           



                          or functional                           



                          abnormalities of the                           



                          kidney, manifested by                           



                          either:Pathological                           



                          abnormalities or                           



                          Markers of kidney                           



                          damage (including                           



                          abnormalities in the                           



                          composition of the                           



                          blood or urine or                           



                          abnormalities in                           



                          imaging tests).                           

 

             Lab Interpretation Abnormal                               



             (test code = 73657-7)                                        



[facility] WITH [id number] 18:10:18





             Test Item    Value        Reference Range Interpretation Comments

 

             WBC (test code =              See_Comment                [Automated

 message]



             9190-2)                                             The system [employer]



                                                                 generated this 

result



                                                                 transmitted ref

erence



                                                                 range: 4.20 - 1

0.70



                                                                 10*3/?L. The re

ference



                                                                 range was not u

sed to



                                                                 interpret this 

result



                                                                 as normal/abnor

mal.

 

             RBC (test code =              See_Comment                [Automated

 message]



             949-8)                                              The system [employer]



                                                                 generated this 

result



                                                                 transmitted ref

erence



                                                                 range: 4.26 - 5

.52



                                                                 10*6/?L. The re

ference



                                                                 range was not u

sed to



                                                                 interpret this 

result



                                                                 as normal/abnor

mal.

 

             HGB (test code = 16.0 g/dL    12.2-16.4                 



             718-7)                                              

 

             HCT (test code = 47.3 %       38.4-49.3                 



             4544-3)                                             

 

             MCV (test code = 87.1 fL      81.7-95.6                 



             787-2)                                              

 

             MCH (test code = 29.5 pg      26.1-32.7                 



             785-6)                                              

 

             MCHC (test code = 33.8 g/dL    31.2-35.0                 



             786-4)                                              

 

             RDW-SD (test code 44.4 fL      38.5-51.6                 



             = 62767-7)                                          

 

             RDW-CV (test code 13.8 %       12.1-15.4                 



             = 788-0)                                            

 

             PLT (test code =              See_Comment                [Automated

 message]



             147-3)                                              The system whic

h



                                                                 generated this 

result



                                                                 transmitted ref

erence



                                                                 range: 150 - 32

8



                                                                 10*3/?L. The re

ference



                                                                 range was not u

sed to



                                                                 interpret this 

result



                                                                 as normal/abnor

mal.

 

             MPV (test code = 10.9 fL      9.8-13.0                  



             67001-7)                                            

 

             NRBC/100 WBC (test              See_Comment                [Automat

ed message]



             code = 2104622594)                                        The syste

m which



                                                                 generated this 

result



                                                                 transmitted ref

erence



                                                                 range: 0.0 - 10

.0 /100



                                                                 WBCs. The refer

ence



                                                                 range was not u

sed to



                                                                 interpret this 

result



                                                                 as normal/abnor

mal.

 

             NRBC x10^3 (test <0.01        See_Comment                [Automated

 message]



             code = 3469911671)                                        The syste

m which



                                                                 generated this 

result



                                                                 transmitted ref

erence



                                                                 range: 10*3/?L.

 The



                                                                 reference range

 was not



                                                                 used to interpr

et this



                                                                 result as



                                                                 normal/abnormal

.

 

             GRAN MAT (NEUT) % 53.4 %                                 



             (test code =                                        



             770-8)                                              

 

             IMM GRAN % (test 0.30 %                                 



             code = 7700527271)                                        

 

             LYMPH % (test code 35.3 %                                 



             = 736-9)                                            

 

             MONO % (test code 7.4 %                                  



             = 5905-5)                                           

 

             EOS % (test code = 3.3 %                                  



             713-8)                                              

 

             BASO % (test code 0.3 %                                  



             = 706-2)                                            

 

             GRAN MAT     3.59 10*3/uL 1.99-6.95                 



             x10^3(ANC) (test                                        



             code = 2814942373)                                        

 

             IMM GRAN x10^3 <0.03        0.00-0.06                 



             (test code =                                        



             3284182703)                                         

 

             LYMPH x10^3 (test 2.37 10*3/uL 1.09-3.23                 



             code = 731-0)                                        

 

             MONO x10^3 (test 0.50 10*3/uL 0.36-1.02                 



             code = 742-7)                                        

 

             EOS x10^3 (test 0.22 10*3/uL 0.06-0.53                 



             code = 711-2)                                        

 

             BASO x10^3 (test <0.03        0.01-0.09                 



             code = 704-7)                                        



[facility] GLUCOSE (AUTOMATED)2022 16:50:23





             Test Item    Value        Reference Range Interpretation Comments

 

             POCT GLU (test code = 5481917300) 304 mg/dL           H      

      

 

             Lab Interpretation (test code = Abnormal                           

    



             24690-0)                                            



[facility] GLUCOSE (AUTOMATED)2022 12:58:13





             Test Item    Value        Reference Range Interpretation Comments

 

             POCT GLU (test code = 3341546008) 364 mg/dL           H      

      

 

             Lab Interpretation (test code = Abnormal                           

    



             05941-8)                                            



[facility] GLUCOSE (AUTOMATED)2022 09:42:05





             Test Item    Value        Reference Range Interpretation Comments

 

             POCT GLU (test code = 9508264500) 369 mg/dL           H      

      

 

             Lab Interpretation (test code = Abnormal                           

    



             52879-3)                                            



[facility] GLUCOSE (AUTOMATED)2022 05:09:33





             Test Item    Value        Reference Range Interpretation Comments

 

             POCT GLU (test code = 6608668497) 332 mg/dL           H      

      

 

             Lab Interpretation (test code = Abnormal                           

    



             48960-3)                                            



[facility] GLUCOSE (AUTOMATED)2022 01:27:31





             Test Item    Value        Reference Range Interpretation Comments

 

             POCT GLU (test code = 3918783750) 349 mg/dL           H      

      

 

             Lab Interpretation (test code = Abnormal                           

    



             58029-8)                                            



[facility] GLUCOSE (AUTOMATED)2022 21:42:26





             Test Item    Value        Reference Range Interpretation Comments

 

             POCT GLU (test code = 4497483044) 372 mg/dL           H      

      

 

             Lab Interpretation (test code = Abnormal                           

    



             09203-1)                                            



[facility] GLUCOSE (AUTOMATED)2022 17:17:16





             Test Item    Value        Reference Range Interpretation Comments

 

             POCT GLU (test code = 8277664431) 329 mg/dL           H      

      

 

             Lab Interpretation (test code = Abnormal                           

    



             74309-0)                                            



[facility] GLUCOSE (AUTOMATED)2022 14:09:34





             Test Item    Value        Reference Range Interpretation Comments

 

             POCT GLU (test code = 4774913111) 350 mg/dL           H      

      

 

             Lab Interpretation (test code = Abnormal                           

    



             59056-8)                                            



[facility] GLUCOSE (AUTOMATED)2022 09:59:20





             Test Item    Value        Reference Range Interpretation Comments

 

             POCT GLU (test code = 9697371023) 342 mg/dL           H      

      

 

             Lab Interpretation (test code = Abnormal                           

    



             63932-6)                                            



[facility] GLUCOSE (AUTOMATED)2022 01:46:05





             Test Item    Value        Reference Range Interpretation Comments

 

             POCT GLU (test code = 8396105763) 318 mg/dL           H      

      

 

             Lab Interpretation (test code = Abnormal                           

    



             73654-7)                                            



[facility] GLUCOSE (AUTOMATED)2022 21:25:33





             Test Item    Value        Reference Range Interpretation Comments

 

             POCT GLU (test code = 6834598139) 212 mg/dL           H      

      

 

             Lab Interpretation (test code = Abnormal                           

    



             76415-3)                                            



[facility] GLUCOSE (AUTOMATED)2022 16:27:52





             Test Item    Value        Reference Range Interpretation Comments

 

             POCT GLU (test code = 4184699500) 226 mg/dL           H      

      

 

             Lab Interpretation (test code = Abnormal                           

    



             92022-9)                                            



[facility] GLUCOSE (AUTOMATED)2022 15:37:46





             Test Item    Value        Reference Range Interpretation Comments

 

             POCT GLU (test code = 9570702351) 204 mg/dL           H      

      

 

             Lab Interpretation (test code = Abnormal                           

    



             34449-6)                                            



[facility] GLUCOSE (AUTOMATED)2022 14:39:25





             Test Item    Value        Reference Range Interpretation Comments

 

             POCT GLU (test code = 4631301090) 185 mg/dL           H      

      

 

             Lab Interpretation (test code = Abnormal                           

    



             36614-0)                                            



[facility] Metabolic Panel (Na, K, Cl, CO2, 
Glucose, BUN, Creatinine, Ca)2022 14:07:03





             Test Item    Value        Reference Range Interpretation Comments

 

             NA (test code = 137 mmol/L   135-145                   



             9300121035)                                         

 

             K (test code = 4.6 mmol/L   3.5-5.0                   



             4534541536)                                         

 

             CL (test code = 111 mmol/L          H            



             9723101760)                                         

 

             CO2 TOTAL (test code = 22 mmol/L    23-31        L            



             7009337417)                                         

 

             AGAP (test code =              2-16                      



             6320057770)                                         

 

             BUN (test code = 12 mg/dL     7-23                      



             8617393201)                                         

 

             GLUCOSE (test code = 181 mg/dL           H            



             5283222929)                                         

 

             CREATININE (test code = 0.70 mg/dL   0.60-1.25                 



             0738537317)                                         

 

             CALCIUM (test code = 8.3 mg/dL    8.6-10.6     L            



             6771516222)                                         

 

             eGFR (test code =              mL/min/1.73m2              



             6116372223)                                         

 

             MAL (test code = MAL) Association of                           



                          Glomerular Filtration                           



                          Rate (GFR) and Staging                           



                          of Kidney Disease*                           



                          +---------------------                           



                          --+-------------------                           



                          --+-------------------                           



                          ------+| GFR                           



                          (mL/min/1.73 m2) ?|                           



                          With Kidney Damage ?|                           



                          ?Without Kidney                           



                          Damage+---------------                           



                          --------+-------------                           



                          --------+-------------                           



                          ------------+| ?>90 ?                           



                          ? ? ? ? ? ? ? ?|                           



                          ?Stage one ? ? ? ? ?|                           



                          ? Normal ? ? ? ? ? ? ?                           



                          ?+--------------------                           



                          ---+------------------                           



                          ---+------------------                           



                          -------+| ?60-89 ? ? ?                           



                          ? ? ? ? ?| ?Stage two                           



                          ? ? ? ? ?| ? Decreased                           



                          GFR ? ? ? ?                            



                          +---------------------                           



                          --+-------------------                           



                          --+-------------------                           



                          ------+| ?30-59 ? ? ?                           



                          ? ? ? ? ?| ?Stage                           



                          three ? ? ? ?| ? Stage                           



                          three ? ? ? ? ?                           



                          +---------------------                           



                          --+-------------------                           



                          --+-------------------                           



                          ------+| ?15-29 ? ? ?                           



                          ? ? ? ? ?| ?Stage four                           



                          ? ? ? ? | ? Stage four                           



                          ? ? ? ? ?                              



                          ?+--------------------                           



                          ---+------------------                           



                          ---+------------------                           



                          -------+| ?<15 (or                           



                          dialysis) ? ?| ?Stage                           



                          five ? ? ? ? | ? Stage                           



                          five ? ? ? ? ?                           



                          ?+--------------------                           



                          ---+------------------                           



                          ---+------------------                           



                          -------+ *Each stage                           



                          assumes the associated                           



                          GFR level has been in                           



                          effect for at least                           



                          three months. ?Stages                           



                          1 to 5, with or                           



                          without kidney                           



                          disease, indicate                           



                          chronic kidney                           



                          disease. Notes:                           



                          Determination of                           



                          stages one and two                           



                          (with eGFR                             



                          >59mL/min/1.73 m2)                           



                          requires estimation of                           



                          kidney damage for at                           



                          least three months as                           



                          defined by structural                           



                          or functional                           



                          abnormalities of the                           



                          kidney, manifested by                           



                          either:Pathological                           



                          abnormalities or                           



                          Markers of kidney                           



                          damage (including                           



                          abnormalities in the                           



                          composition of the                           



                          blood or urine or                           



                          abnormalities in                           



                          imaging tests).                           

 

             Lab Interpretation Abnormal                               



             (test code = 26835-0)                                        



[facility] GLUCOSE (AUTOMATED)2022 13:45:48





             Test Item    Value        Reference Range Interpretation Comments

 

             POCT GLU (test code = 2448626121) 170 mg/dL           H      

      

 

             Lab Interpretation (test code = Abnormal                           

    



             86456-9)                                            



[facility] GLUCOSE (AUTOMATED)2022 12:28:12





             Test Item    Value        Reference Range Interpretation Comments

 

             POCT GLU (test code = 8054096578) 109 mg/dL                  

      

 

             Lab Interpretation (test code = Normal                             

    



             35950-4)                                            



[facility] GLUCOSE (AUTOMATED)2022 11:25:37





             Test Item    Value        Reference Range Interpretation Comments

 

             POCT GLU (test code = 5477951778) 134 mg/dL           H      

      

 

             Lab Interpretation (test code = Abnormal                           

    



             16340-8)                                            



[facility] Metabolic Panel (Na, K, Cl, CO2, 
Glucose, BUN, Creatinine, Ca)2022 10:50:17





             Test Item    Value        Reference Range Interpretation Comments

 

             NA (test code = 138 mmol/L   135-145                   



             8204055692)                                         

 

             K (test code = 5.1 mmol/L   3.5-5.0      H            Slight



             5845654437)                                         hemolysis

 

             CL (test code = 110 mmol/L          H            



             8399212432)                                         

 

             CO2 TOTAL (test code 23 mmol/L    23-31                     



             = 7870498953)                                        

 

             AGAP (test code =              2-16                      



             8712562667)                                         

 

             BUN (test code = 13 mg/dL     7-23                      Slight



             5108939535)                                         hemolysis

 

             GLUCOSE (test code = 131 mg/dL           H            



             3412280272)                                         

 

             CREATININE (test code 0.73 mg/dL   0.60-1.25                 



             = 8895368282)                                        

 

             CALCIUM (test code = 8.9 mg/dL    8.6-10.6                  



             6921881250)                                         

 

             eGFR (test code =              mL/min/1.73m2              



             1888442317)                                         

 

             MAL (test code = MAL) Association of                           



                          Glomerular                             



                          Filtration Rate                           



                          (GFR) and Staging                           



                          of Kidney Disease*                           



                          +------------------                           



                          -----+-------------                           



                          --------+----------                           



                          ---------------+|                           



                          GFR (mL/min/1.73                           



                          m2) ?| With Kidney                           



                          Damage ?| ?Without                           



                          Kidney                                 



                          Damage+------------                           



                          -----------+-------                           



                          --------------+----                           



                          -------------------                           



                          --+| ?>90 ? ? ? ? ?                           



                          ? ? ? ?| ?Stage one                           



                          ? ? ? ? ?| ? Normal                           



                          ? ? ? ? ? ? ?                           



                          ?+-----------------                           



                          ------+------------                           



                          ---------+---------                           



                          ----------------+|                           



                          ?60-89 ? ? ? ? ? ?                           



                          ? ?| ?Stage two ? ?                           



                          ? ? ?| ? Decreased                           



                          GFR ? ? ? ?                            



                          +------------------                           



                          -----+-------------                           



                          --------+----------                           



                          ---------------+|                           



                          ?30-59 ? ? ? ? ? ?                           



                          ? ?| ?Stage three ?                           



                          ? ? ?| ? Stage                           



                          three ? ? ? ? ?                           



                          +------------------                           



                          -----+-------------                           



                          --------+----------                           



                          ---------------+|                           



                          ?15-29 ? ? ? ? ? ?                           



                          ? ?| ?Stage four ?                           



                          ? ? ? | ? Stage                           



                          four ? ? ? ? ?                           



                          ?+-----------------                           



                          ------+------------                           



                          ---------+---------                           



                          ----------------+|                           



                          ?<15 (or dialysis)                           



                          ? ?| ?Stage five ?                           



                          ? ? ? | ? Stage                           



                          five ? ? ? ? ?                           



                          ?+-----------------                           



                          ------+------------                           



                          ---------+---------                           



                          ----------------+                           



                          *Each stage assumes                           



                          the associated GFR                           



                          level has been in                           



                          effect for at least                           



                          three months.                           



                          ?Stages 1 to 5,                           



                          with or without                           



                          kidney disease,                           



                          indicate chronic                           



                          kidney disease.                           



                          Notes:                                 



                          Determination of                           



                          stages one and two                           



                          (with eGFR                             



                          >59mL/min/1.73 m2)                           



                          requires estimation                           



                          of kidney damage                           



                          for at least three                           



                          months as defined                           



                          by structural or                           



                          functional                             



                          abnormalities of                           



                          the kidney,                            



                          manifested by                           



                          either:Pathological                           



                          abnormalities or                           



                          Markers of kidney                           



                          damage (including                           



                          abnormalities in                           



                          the composition of                           



                          the blood or urine                           



                          or abnormalities in                           



                          imaging tests).                           

 

             Lab Interpretation Abnormal                               



             (test code = 32789-1)                                        



[facility] GLUCOSE (AUTOMATED)2022 10:35:11





             Test Item    Value        Reference Range Interpretation Comments

 

             POCT GLU (test code = 7460705829) 125 mg/dL           H      

      

 

             Lab Interpretation (test code = Abnormal                           

    



             59938-7)                                            



[facility] GLUCOSE (AUTOMATED)2022 09:31:05





             Test Item    Value        Reference Range Interpretation Comments

 

             POCT GLU (test code = 5494504020) 137 mg/dL           H      

      

 

             Lab Interpretation (test code = Abnormal                           

    



             92863-2)                                            



[facility] GLUCOSE (AUTOMATED)2022 08:28:48





             Test Item    Value        Reference Range Interpretation Comments

 

             POCT GLU (test code = 4647933959) 168 mg/dL           H      

      

 

             Lab Interpretation (test code = Abnormal                           

    



             54126-3)                                            



[facility] GLUCOSE (AUTOMATED)2022 07:26:17





             Test Item    Value        Reference Range Interpretation Comments

 

             POCT GLU (test code = 8908554114) 165 mg/dL           H      

      

 

             Lab Interpretation (test code = Abnormal                           

    



             93801-9)                                            



[facility] Metabolic Panel (Na, K, Cl, CO2, 
Glucose, BUN, Creatinine, Ca)2022 07:18:59





             Test Item    Value        Reference Range Interpretation Comments

 

             NA (test code = 136 mmol/L   135-145                   



             3253047930)                                         

 

             K (test code = 5.1 mmol/L   3.5-5.0      H            



             9693437975)                                         

 

             CL (test code = 108 mmol/L                       



             9465434343)                                         

 

             CO2 TOTAL (test code = 24 mmol/L    23-31                     



             2316967205)                                         

 

             AGAP (test code =              2-16                      



             3176581399)                                         

 

             BUN (test code = 14 mg/dL     7-23                      



             4742377922)                                         

 

             GLUCOSE (test code = 202 mg/dL           H            



             5929496763)                                         

 

             CREATININE (test code = 0.79 mg/dL   0.60-1.25                 



             6685171175)                                         

 

             CALCIUM (test code = 8.9 mg/dL    8.6-10.6                  



             1456491956)                                         

 

             eGFR (test code =              mL/min/1.73m2              



             0745016689)                                         

 

             MAL (test code = MAL) Association of                           



                          Glomerular Filtration                           



                          Rate (GFR) and Staging                           



                          of Kidney Disease*                           



                          +---------------------                           



                          --+-------------------                           



                          --+-------------------                           



                          ------+| GFR                           



                          (mL/min/1.73 m2) ?|                           



                          With Kidney Damage ?|                           



                          ?Without Kidney                           



                          Damage+---------------                           



                          --------+-------------                           



                          --------+-------------                           



                          ------------+| ?>90 ?                           



                          ? ? ? ? ? ? ? ?|                           



                          ?Stage one ? ? ? ? ?|                           



                          ? Normal ? ? ? ? ? ? ?                           



                          ?+--------------------                           



                          ---+------------------                           



                          ---+------------------                           



                          -------+| ?60-89 ? ? ?                           



                          ? ? ? ? ?| ?Stage two                           



                          ? ? ? ? ?| ? Decreased                           



                          GFR ? ? ? ?                            



                          +---------------------                           



                          --+-------------------                           



                          --+-------------------                           



                          ------+| ?30-59 ? ? ?                           



                          ? ? ? ? ?| ?Stage                           



                          three ? ? ? ?| ? Stage                           



                          three ? ? ? ? ?                           



                          +---------------------                           



                          --+-------------------                           



                          --+-------------------                           



                          ------+| ?15-29 ? ? ?                           



                          ? ? ? ? ?| ?Stage four                           



                          ? ? ? ? | ? Stage four                           



                          ? ? ? ? ?                              



                          ?+--------------------                           



                          ---+------------------                           



                          ---+------------------                           



                          -------+| ?<15 (or                           



                          dialysis) ? ?| ?Stage                           



                          five ? ? ? ? | ? Stage                           



                          five ? ? ? ? ?                           



                          ?+--------------------                           



                          ---+------------------                           



                          ---+------------------                           



                          -------+ *Each stage                           



                          assumes the associated                           



                          GFR level has been in                           



                          effect for at least                           



                          three months. ?Stages                           



                          1 to 5, with or                           



                          without kidney                           



                          disease, indicate                           



                          chronic kidney                           



                          disease. Notes:                           



                          Determination of                           



                          stages one and two                           



                          (with eGFR                             



                          >59mL/min/1.73 m2)                           



                          requires estimation of                           



                          kidney damage for at                           



                          least three months as                           



                          defined by structural                           



                          or functional                           



                          abnormalities of the                           



                          kidney, manifested by                           



                          either:Pathological                           



                          abnormalities or                           



                          Markers of kidney                           



                          damage (including                           



                          abnormalities in the                           



                          composition of the                           



                          blood or urine or                           



                          abnormalities in                           



                          imaging tests).                           

 

             Lab Interpretation Abnormal                               



             (test code = 69650-3)                                        



[facility] [place]-[id number] 06:57:08





             Test Item    Value        Reference Range Interpretation Comments

 

             BOH (test code = <0.1         mmol/L                    



             1590625615)                                         

 

             MAL (test code = Normal Ranges: ? ?                           



             MAL)         Nonfasting ? ? ? ? ? Less                           



                          than 0.1 mmol/L ? ?                           



                          Overnight Fast ? ? ? Less                           



                          than 0.4 mmol/L ? ? Fasting                           



                          (1-2 weeks) ?6-8 mmol/L Test                          

 



                          developed and                           



                          characteristics determined                           



                          by [facility].                          

 



[facility] GLUCOSE (AUTOMATED)2022 06:24:07





             Test Item    Value        Reference Range Interpretation Comments

 

             POCT GLU (test code = 0922327039) 212 mg/dL           H      

      

 

             Lab Interpretation (test code = Abnormal                           

    



             95276-1)                                            



[facility] GLUCOSE (AUTOMATED)2022 05:28:19





             Test Item    Value        Reference Range Interpretation Comments

 

             POCT GLU (test code = 9475614984) 239 mg/dL           H      

      

 

             Lab Interpretation (test code = Abnormal                           

    



             02639-2)                                            



[facility] GLUCOSE (AUTOMATED)2022 04:29:31





             Test Item    Value        Reference Range Interpretation Comments

 

             POCT GLU (test code = 0237858127) 251 mg/dL           H      

      

 

             Lab Interpretation (test code = Abnormal                           

    



             78853-2)                                            



[facility] (Na, K, Cl, CO2, 
Glucose, BUN, Creatinine, Ca)2022 03:49:55





             Test Item    Value        Reference Range Interpretation Comments

 

             NA (test code = 141 mmol/L   135-145                   



             1515414158)                                         

 

             K (test code = 4.2 mmol/L   3.5-5.0                   



             4532583977)                                         

 

             CL (test code = 108 mmol/L                       



             0789676492)                                         

 

             CO2 TOTAL (test code = 26 mmol/L    23-31                     



             1867871605)                                         

 

             AGAP (test code =              2-16                      



             3787418189)                                         

 

             BUN (test code = 14 mg/dL     7-23                      



             7583993239)                                         

 

             GLUCOSE (test code = 164 mg/dL           H            



             1858866876)                                         

 

             CREATININE (test code = 0.84 mg/dL   0.60-1.25                 



             9925546116)                                         

 

             CALCIUM (test code = 9.3 mg/dL    8.6-10.6                  



             0001494284)                                         

 

             eGFR (test code =              mL/min/1.73m2              



             6915608805)                                         

 

             MAL (test code = MAL) Association of                           



                          Glomerular Filtration                           



                          Rate (GFR) and Staging                           



                          of Kidney Disease*                           



                          +---------------------                           



                          --+-------------------                           



                          --+-------------------                           



                          ------+| GFR                           



                          (mL/min/1.73 m2) ?|                           



                          With Kidney Damage ?|                           



                          ?Without Kidney                           



                          Damage+---------------                           



                          --------+-------------                           



                          --------+-------------                           



                          ------------+| ?>90 ?                           



                          ? ? ? ? ? ? ? ?|                           



                          ?Stage one ? ? ? ? ?|                           



                          ? Normal ? ? ? ? ? ? ?                           



                          ?+--------------------                           



                          ---+------------------                           



                          ---+------------------                           



                          -------+| ?60-89 ? ? ?                           



                          ? ? ? ? ?| ?Stage two                           



                          ? ? ? ? ?| ? Decreased                           



                          GFR ? ? ? ?                            



                          +---------------------                           



                          --+-------------------                           



                          --+-------------------                           



                          ------+| ?30-59 ? ? ?                           



                          ? ? ? ? ?| ?Stage                           



                          three ? ? ? ?| ? Stage                           



                          three ? ? ? ? ?                           



                          +---------------------                           



                          --+-------------------                           



                          --+-------------------                           



                          ------+| ?15-29 ? ? ?                           



                          ? ? ? ? ?| ?Stage four                           



                          ? ? ? ? | ? Stage four                           



                          ? ? ? ? ?                              



                          ?+--------------------                           



                          ---+------------------                           



                          ---+------------------                           



                          -------+| ?<15 (or                           



                          dialysis) ? ?| ?Stage                           



                          five ? ? ? ? | ? Stage                           



                          five ? ? ? ? ?                           



                          ?+--------------------                           



                          ---+------------------                           



                          ---+------------------                           



                          -------+ *Each stage                           



                          assumes the associated                           



                          GFR level has been in                           



                          effect for at least                           



                          three months. ?Stages                           



                          1 to 5, with or                           



                          without kidney                           



                          disease, indicate                           



                          chronic kidney                           



                          disease. Notes:                           



                          Determination of                           



                          stages one and two                           



                          (with eGFR                             



                          >59mL/min/1.73 m2)                           



                          requires estimation of                           



                          kidney damage for at                           



                          least three months as                           



                          defined by structural                           



                          or functional                           



                          abnormalities of the                           



                          kidney, manifested by                           



                          either:Pathological                           



                          abnormalities or                           



                          Markers of kidney                           



                          damage (including                           



                          abnormalities in the                           



                          composition of the                           



                          blood or urine or                           



                          abnormalities in                           



                          imaging tests).                           

 

             Lab Interpretation Abnormal                               



             (test code = 27687-2)                                        



[facility] GLUCOSE (AUTOMATED)2022 03:20:15





             Test Item    Value        Reference Range Interpretation Comments

 

             POCT GLU (test code = 7076990284) 174 mg/dL           H      

      

 

             Lab Interpretation (test code = Abnormal                           

    



             49981-0)                                            



[facility] GLUCOSE (AUTOMATED)2022 02:25:54





             Test Item    Value        Reference Range Interpretation Comments

 

             POCT GLU (test code = 6926787164) 158 mg/dL           H      

      

 

             Lab Interpretation (test code = Abnormal                           

    



             15652-9)                                            



[facility] GLUCOSE (AUTOMATED)2022 01:37:09





             Test Item    Value        Reference Range Interpretation Comments

 

             POCT GLU (test code = 0776615176) 219 mg/dL           H      

      

 

             Lab Interpretation (test code = Abnormal                           

    



             36412-6)                                            



[facility] GLUCOSE (AUTOMATED)2022 00:19:48





             Test Item    Value        Reference Range Interpretation Comments

 

             POCT GLU (test code = 3732435479) 345 mg/dL           H      

      

 

             Lab Interpretation (test code = Abnormal                           

    



             95120-3)                                            



[facility] GLUCOSE (AUTOMATED)2022 22:59:57





             Test Item    Value        Reference Range Interpretation Comments

 

             POCT GLU (test code = 5204456768) 318 mg/dL           H      

      

 

             Lab Interpretation (test code = Abnormal                           

    



             18197-8)                                            



[facility] (Na, K, Cl, CO2, 
Glucose, BUN, Creatinine, Ca)2022 22:41:00





             Test Item    Value        Reference Range Interpretation Comments

 

             NA (test code = 140 mmol/L   135-145                   



             9810732997)                                         

 

             K (test code = 3.1 mmol/L   3.5-5.0      L            



             5959357479)                                         

 

             CL (test code = 116 mmol/L          H            



             1039692227)                                         

 

             CO2 TOTAL (test code = 21 mmol/L    23-31        L            



             4825385113)                                         

 

             AGAP (test code =              2-16                      



             7498996056)                                         

 

             BUN (test code = 12 mg/dL     7-23                      



             1506995390)                                         

 

             GLUCOSE (test code = 281 mg/dL           H            



             6724541113)                                         

 

             CREATININE (test code = 0.62 mg/dL   0.60-1.25                 



             0918259760)                                         

 

             CALCIUM (test code = 7.2 mg/dL    8.6-10.6     L            



             2691128152)                                         

 

             eGFR (test code =              mL/min/1.73m2              



             5824802064)                                         

 

             MAL (test code = MAL) Association of                           



                          Glomerular Filtration                           



                          Rate (GFR) and Staging                           



                          of Kidney Disease*                           



                          +---------------------                           



                          --+-------------------                           



                          --+-------------------                           



                          ------+| GFR                           



                          (mL/min/1.73 m2) ?|                           



                          With Kidney Damage ?|                           



                          ?Without Kidney                           



                          Damage+---------------                           



                          --------+-------------                           



                          --------+-------------                           



                          ------------+| ?>90 ?                           



                          ? ? ? ? ? ? ? ?|                           



                          ?Stage one ? ? ? ? ?|                           



                          ? Normal ? ? ? ? ? ? ?                           



                          ?+--------------------                           



                          ---+------------------                           



                          ---+------------------                           



                          -------+| ?60-89 ? ? ?                           



                          ? ? ? ? ?| ?Stage two                           



                          ? ? ? ? ?| ? Decreased                           



                          GFR ? ? ? ?                            



                          +---------------------                           



                          --+-------------------                           



                          --+-------------------                           



                          ------+| ?30-59 ? ? ?                           



                          ? ? ? ? ?| ?Stage                           



                          three ? ? ? ?| ? Stage                           



                          three ? ? ? ? ?                           



                          +---------------------                           



                          --+-------------------                           



                          --+-------------------                           



                          ------+| ?15-29 ? ? ?                           



                          ? ? ? ? ?| ?Stage four                           



                          ? ? ? ? | ? Stage four                           



                          ? ? ? ? ?                              



                          ?+--------------------                           



                          ---+------------------                           



                          ---+------------------                           



                          -------+| ?<15 (or                           



                          dialysis) ? ?| ?Stage                           



                          five ? ? ? ? | ? Stage                           



                          five ? ? ? ? ?                           



                          ?+--------------------                           



                          ---+------------------                           



                          ---+------------------                           



                          -------+ *Each stage                           



                          assumes the associated                           



                          GFR level has been in                           



                          effect for at least                           



                          three months. ?Stages                           



                          1 to 5, with or                           



                          without kidney                           



                          disease, indicate                           



                          chronic kidney                           



                          disease. Notes:                           



                          Determination of                           



                          stages one and two                           



                          (with eGFR                             



                          >59mL/min/1.73 m2)                           



                          requires estimation of                           



                          kidney damage for at                           



                          least three months as                           



                          defined by structural                           



                          or functional                           



                          abnormalities of the                           



                          kidney, manifested by                           



                          either:Pathological                           



                          abnormalities or                           



                          Markers of kidney                           



                          damage (including                           



                          abnormalities in the                           



                          composition of the                           



                          blood or urine or                           



                          abnormalities in                           



                          imaging tests).                           

 

             Lab Interpretation Abnormal                               



             (test code = 37875-4)                                        



[facility] GLUCOSE (AUTOMATED)2022 21:53:29





             Test Item    Value        Reference Range Interpretation Comments

 

             POCT GLU (test code = 4554474145) 368 mg/dL           H      

      

 

             Lab Interpretation (test code = Abnormal                           

    



             79020-5)                                            



[facility] GLUCOSE (AUTOMATED)2022 20:41:51





             Test Item    Value        Reference Range Interpretation Comments

 

             POCT GLU (test code = 7593384829) 458 mg/dL           HH     

      

 

             Lab Interpretation (test code = Abnormal                           

    



             85259-2)                                            



[facility] GLUCOSE (AUTOMATED)2022 19:00:13





             Test Item    Value        Reference Range Interpretation Comments

 

             POCT GLU (test code = 2622726347) 369 mg/dL           H      

      

 

             Lab Interpretation (test code = Abnormal                           

    



             82700-5)                                            



[facility] Metabolic Panel (Na, K, Cl, CO2, 
Glucose, BUN, Creatinine, Ca)2022 18:32:29





             Test Item    Value        Reference Range Interpretation Comments

 

             NA (test code = 145 mmol/L   135-145                   



             9119414561)                                         

 

             K (test code = 4.8 mmol/L   3.5-5.0                   Slight



             6855988342)                                         hemolysis

 

             CL (test code = 112 mmol/L          H            



             8166152909)                                         

 

             CO2 TOTAL (test code 26 mmol/L    23-31                     



             = 1913526426)                                        

 

             AGAP (test code =              2-16                      



             9160428976)                                         

 

             BUN (test code = 16 mg/dL     7-23                      Slight



             1859357833)                                         hemolysis

 

             GLUCOSE (test code = 282 mg/dL           H            



             6150913158)                                         

 

             CREATININE (test code 0.83 mg/dL   0.60-1.25                 



             = 0159611989)                                        

 

             CALCIUM (test code = 10.0 mg/dL   8.6-10.6                  



             0577926891)                                         

 

             eGFR (test code =              mL/min/1.73m2              



             5315658492)                                         

 

             MAL (test code = MAL) Association of                           



                          Glomerular                             



                          Filtration Rate                           



                          (GFR) and Staging                           



                          of Kidney Disease*                           



                          +------------------                           



                          -----+-------------                           



                          --------+----------                           



                          ---------------+|                           



                          GFR (mL/min/1.73                           



                          m2) ?| With Kidney                           



                          Damage ?| ?Without                           



                          Kidney                                 



                          Damage+------------                           



                          -----------+-------                           



                          --------------+----                           



                          -------------------                           



                          --+| ?>90 ? ? ? ? ?                           



                          ? ? ? ?| ?Stage one                           



                          ? ? ? ? ?| ? Normal                           



                          ? ? ? ? ? ? ?                           



                          ?+-----------------                           



                          ------+------------                           



                          ---------+---------                           



                          ----------------+|                           



                          ?60-89 ? ? ? ? ? ?                           



                          ? ?| ?Stage two ? ?                           



                          ? ? ?| ? Decreased                           



                          GFR ? ? ? ?                            



                          +------------------                           



                          -----+-------------                           



                          --------+----------                           



                          ---------------+|                           



                          ?30-59 ? ? ? ? ? ?                           



                          ? ?| ?Stage three ?                           



                          ? ? ?| ? Stage                           



                          three ? ? ? ? ?                           



                          +------------------                           



                          -----+-------------                           



                          --------+----------                           



                          ---------------+|                           



                          ?15-29 ? ? ? ? ? ?                           



                          ? ?| ?Stage four ?                           



                          ? ? ? | ? Stage                           



                          four ? ? ? ? ?                           



                          ?+-----------------                           



                          ------+------------                           



                          ---------+---------                           



                          ----------------+|                           



                          ?<15 (or dialysis)                           



                          ? ?| ?Stage five ?                           



                          ? ? ? | ? Stage                           



                          five ? ? ? ? ?                           



                          ?+-----------------                           



                          ------+------------                           



                          ---------+---------                           



                          ----------------+                           



                          *Each stage assumes                           



                          the associated GFR                           



                          level has been in                           



                          effect for at least                           



                          three months.                           



                          ?Stages 1 to 5,                           



                          with or without                           



                          kidney disease,                           



                          indicate chronic                           



                          kidney disease.                           



                          Notes:                                 



                          Determination of                           



                          stages one and two                           



                          (with eGFR                             



                          >59mL/min/1.73 m2)                           



                          requires estimation                           



                          of kidney damage                           



                          for at least three                           



                          months as defined                           



                          by structural or                           



                          functional                             



                          abnormalities of                           



                          the kidney,                            



                          manifested by                           



                          either:Pathological                           



                          abnormalities or                           



                          Markers of kidney                           



                          damage (including                           



                          abnormalities in                           



                          the composition of                           



                          the blood or urine                           



                          or abnormalities in                           



                          imaging tests).                           

 

             Lab Interpretation Abnormal                               



             (test code = 46977-4)                                        



[facility]





             Test Item    Value        Reference Range Interpretation Comments

 

             POCT GLU (test code = 0174732998) 284 mg/dL           H      

      

 

             Lab Interpretation (test code = Abnormal                           

    



             60286-6)                                            



[facility]13





             Test Item    Value        Reference Range Interpretation Comments

 

             BOH (test code = 0.9 mmol/L                             



             0263823014)                                         

 

             MAL (test code = Normal Ranges: ? ?                           



             MAL)         Nonfasting ? ? ? ? ? Less                           



                          than 0.1 mmol/L ? ?                           



                          Overnight Fast ? ? ? Less                           



                          than 0.4 mmol/L ? ? Fasting                           



                          (1-2 weeks) ?6-8 mmol/L Test                          

 



                          developed and                           



                          characteristics determined                           



                          by [facility].                          

 



[facility] GLUCOSE (AUTOMATED)2022 16:33:41





             Test Item    Value        Reference Range Interpretation Comments

 

             POCT GLU (test code = 5868513246) 266 mg/dL           H      

      

 

             Lab Interpretation (test code = Abnormal                           

    



             60544-0)                                            



[facility] GLUCOSE (AUTOMATED)2022 15:30:41





             Test Item    Value        Reference Range Interpretation Comments

 

             POCT GLU (test code = 9318797564) 294 mg/dL           H      

      

 

             Lab Interpretation (test code = Abnormal                           

    



             86793-2)                                            



[facility] GLUCOSE (AUTOMATED)2022 14:23:40





             Test Item    Value        Reference Range Interpretation Comments

 

             POCT GLU (test code = 7648323815) 511 mg/dL           HH     

      

 

             Lab Interpretation (test code = Abnormal                           

    



             52581-7)                                            



[facility] WITHOUT [id number] 13:55:53





             Test Item    Value        Reference Range Interpretation Comments

 

             WBC (test code = 6690-2)              See_Comment  H             [A

utomated message]



                                                                 The system [employer]



                                                                 generated this



                                                                 result transmit

huma



                                                                 reference range

:



                                                                 4.20 - 10.70



                                                                 10*3/?L. The



                                                                 reference range

 was



                                                                 not used to



                                                                 interpret this



                                                                 result as



                                                                 normal/abnormal

.

 

             RBC (test code = 789-8)              See_Comment  H             [Au

tomated message]



                                                                 The system [employer]



                                                                 generated this



                                                                 result transmit

huma



                                                                 reference range

:



                                                                 4.26 - 5.52 10*

6/?L.



                                                                 The reference r

zhen



                                                                 was not used to



                                                                 interpret this



                                                                 result as



                                                                 normal/abnormal

.

 

             HGB (test code = 718-7) 16.2 g/dL    12.2-16.4                 

 

             HCT (test code = 4544-3) 48.4 %       38.4-49.3                 

 

             MCH (test code = 785-6) 29.1 pg      26.1-32.7                 

 

             MCV (test code = 787-2) 86.9 fL      81.7-95.6                 

 

             MCHC (test code = 786-4) 33.5 g/dL    31.2-35.0                 

 

             PLT (test code = 777-3)              See_Comment  H             [Au

tomated message]



                                                                 The system [employer]



                                                                 generated this



                                                                 result transmit

huma



                                                                 reference range

: 150



                                                                 - 328 10*3/?L. 

The



                                                                 reference range

 was



                                                                 not used to



                                                                 interpret this



                                                                 result as



                                                                 normal/abnormal

.

 

             MPV (test code = 11.0 fL      9.8-13.0                  



             37076-6)                                            

 

             RDW-CV (test code = 14.2 %       12.1-15.4                 



             788-0)                                              

 

             RDW-SD (test code = 44.8 fL      38.5-51.6                 



             85056-0)                                            

 

             NRBC x10^3 (test code = <0.01        See_Comment                [Au

tomated message]



             3435802085)                                         The system [employer]

h



                                                                 generated this



                                                                 result transmit

huma



                                                                 reference range

:



                                                                 10*3/?L. The



                                                                 reference range

 was



                                                                 not used to



                                                                 interpret this



                                                                 result as



                                                                 normal/abnormal

.

 

             NRBC/100 WBC (test code              See_Comment                [Au

tomated message]



             = [id number])                                        The system [employer]





                                                                 generated this



                                                                 result transmit

huma



                                                                 reference range

: 0.0



                                                                 - 10.0 /100 WBC

s.



                                                                 The reference r

zhen



                                                                 was not used to



                                                                 interpret this



                                                                 result as



                                                                 normal/abnormal

.

 

             IPF % (test code =                                        



             [id number])                                         

 

             Lab Interpretation (test Abnormal                               



             code = 12494-2)                                        



[facility] GLUCOSE (AUTOMATED)2022 13:34:21





             Test Item    Value        Reference Range Interpretation Comments

 

             POCT GLU (test code = 5439967608) 538 mg/dL           HH     

      

 

             Lab Interpretation (test code = Abnormal                           

    



             19441-4)                                            



[facility] GLUCOSE (AUTOMATED)2022 13:30:43





             Test Item    Value        Reference Range Interpretation Comments

 

             POCT GLU (test code = 4648948534) >600                HH     

      

 

             Lab Interpretation (test code = Abnormal                           

    



             65216-2)                                            



[facility] GLUCOSE (AUTOMATED)2022 13:30:43





             Test Item    Value        Reference Range Interpretation Comments

 

             POCT GLU (test code = 6352047368) >600                HH     

      

 

             Lab Interpretation (test code = Abnormal                           

    



             27131-3)                                            



[facility] GLUCOSE (AUTOMATED)2022 13:30:43





             Test Item    Value        Reference Range Interpretation Comments

 

             POCT GLU (test code = >600                HH           Notifi

ed Provider



             1611701314)                                         

 

             Lab Interpretation (test Abnormal                               



             code = 61434-1)                                        



[facility] GLUCOSE (AUTOMATED)2022 13:30:43





             Test Item    Value        Reference Range Interpretation Comments

 

             POCT GLU (test code = 1413697637) >600                HH     

      

 

             Lab Interpretation (test code = Abnormal                           

    



             91134-8)                                            



[facility] GLUCOSE (AUTOMATED)2022 13:30:38





             Test Item    Value        Reference Range Interpretation Comments

 

             POCT GLU (test code = 5768708042) >600                HH     

      

 

             Lab Interpretation (test code = Abnormal                           

    



             84654-4)                                            



[facility] GLUCOSE (AUTOMATED)2022 13:30:38





             Test Item    Value        Reference Range Interpretation Comments

 

             POCT GLU (test code = 1314401181) >600                HH     

      

 

             Lab Interpretation (test code = Abnormal                           

    



             43340-8)                                            



[id number]





             Test Item    Value        Reference Range Interpretation Comments

 

             OSMOLALITY (test code =              See_Comment  HH            [Au

tomated message]



             2692-2)                                             The system [employer]



                                                                 generated this 

result



                                                                 transmitted ref

erence



                                                                 range: 278 - 30

5



                                                                 mOsm/kg. The



                                                                 reference range

 was



                                                                 not used to int

erpret



                                                                 this result as



                                                                 normal/abnormal

.

 

             Lab Interpretation (test Abnormal                               



             code = 96911-5)                                        



[facility] Metabolic Panel (Na, K, Cl, CO2, 
Glucose, BUN, Creatinine, Ca)2022 12:23:35





             Test Item    Value        Reference Range Interpretation Comments

 

             NA (test code = 145 mmol/L   135-145                   



             6618233006)                                         

 

             K (test code = 4.4 mmol/L   3.5-5.0                   



             6477285183)                                         

 

             CL (test code = 109 mmol/L          H            



             5899280526)                                         

 

             CO2 TOTAL (test code = 21 mmol/L    23-31        L            



             7352130794)                                         

 

             AGAP (test code =              2-16                      



             2010231303)                                         

 

             BUN (test code = 15 mg/dL     7-23                      



             7046528813)                                         

 

             GLUCOSE (test code = 753 mg/dL           HH           



             8237094597)                                         

 

             CREATININE (test code = 0.79 mg/dL   0.60-1.25                 



             4832806893)                                         

 

             CALCIUM (test code = 10.9 mg/dL   8.6-10.6     H            



             0188536140)                                         

 

             eGFR (test code =              mL/min/1.73m2              



             8946854516)                                         

 

             MAL (test code = MAL) Association of                           



                          Glomerular Filtration                           



                          Rate (GFR) and Staging                           



                          of Kidney Disease*                           



                          +---------------------                           



                          --+-------------------                           



                          --+-------------------                           



                          ------+| GFR                           



                          (mL/min/1.73 m2) ?|                           



                          With Kidney Damage ?|                           



                          ?Without Kidney                           



                          Damage+---------------                           



                          --------+-------------                           



                          --------+-------------                           



                          ------------+| ?>90 ?                           



                          ? ? ? ? ? ? ? ?|                           



                          ?Stage one ? ? ? ? ?|                           



                          ? Normal ? ? ? ? ? ? ?                           



                          ?+--------------------                           



                          ---+------------------                           



                          ---+------------------                           



                          -------+| ?60-89 ? ? ?                           



                          ? ? ? ? ?| ?Stage two                           



                          ? ? ? ? ?| ? Decreased                           



                          GFR ? ? ? ?                            



                          +---------------------                           



                          --+-------------------                           



                          --+-------------------                           



                          ------+| ?30-59 ? ? ?                           



                          ? ? ? ? ?| ?Stage                           



                          three ? ? ? ?| ? Stage                           



                          three ? ? ? ? ?                           



                          +---------------------                           



                          --+-------------------                           



                          --+-------------------                           



                          ------+| ?15-29 ? ? ?                           



                          ? ? ? ? ?| ?Stage four                           



                          ? ? ? ? | ? Stage four                           



                          ? ? ? ? ?                              



                          ?+--------------------                           



                          ---+------------------                           



                          ---+------------------                           



                          -------+| ?<15 (or                           



                          dialysis) ? ?| ?Stage                           



                          five ? ? ? ? | ? Stage                           



                          five ? ? ? ? ?                           



                          ?+--------------------                           



                          ---+------------------                           



                          ---+------------------                           



                          -------+ *Each stage                           



                          assumes the associated                           



                          GFR level has been in                           



                          effect for at least                           



                          three months. ?Stages                           



                          1 to 5, with or                           



                          without kidney                           



                          disease, indicate                           



                          chronic kidney                           



                          disease. Notes:                           



                          Determination of                           



                          stages one and two                           



                          (with eGFR                             



                          >59mL/min/1.73 m2)                           



                          requires estimation of                           



                          kidney damage for at                           



                          least three months as                           



                          defined by structural                           



                          or functional                           



                          abnormalities of the                           



                          kidney, manifested by                           



                          either:Pathological                           



                          abnormalities or                           



                          Markers of kidney                           



                          damage (including                           



                          abnormalities in the                           



                          composition of the                           



                          blood or urine or                           



                          abnormalities in                           



                          imaging tests).                           

 

             Lab Interpretation Abnormal                               



             (test code = 01325-3)                                        



[facility]56





             Test Item    Value        Reference Range Interpretation Comments

 

             POCT GLU (test code = 0359885490) 564 mg/dL           HH     

      

 

             Lab Interpretation (test code = Abnormal                           

    



             07371-3)                                            



[id number] 12:05:40





             Test Item    Value        Reference Range Interpretation Comments

 

             MAGNESIUM (test code = 5295612969) 2.5 mg/dL    1.7-2.4      H     

       

 

             Lab Interpretation (test code = Abnormal                           

    



             57267-3)                                            



[facility]





             Test Item    Value        Reference Range Interpretation Comments

 

             MAGNESIUM (test code = 1688582878) 2.5 mg/dL    1.7-2.4      H     

       

 

             Lab Interpretation (test code = Abnormal                           

    



             17653-4)                                            



[facility] [id number] 12:05:20





             Test Item    Value        Reference Range Interpretation Comments

 

             PHOSPHORUS (test code = 5535291500) 6.2 mg/dL    2.5-5.0      H    

        

 

             Lab Interpretation (test code = Abnormal                           

    



             07729-0)                                            



[facility] LACT[facility] [id number] 05:53:48





             Test Item    Value        Reference Range Interpretation Comments

 

             PH (test code =              7.32-7.42                 



             0330837150)                                         

 

             PCO2 MARCELINA (test code              See_Comment                [Automa

huma



             = 4283579538)                                        message] The



                                                                 system which



                                                                 generated this



                                                                 result



                                                                 transmitted



                                                                 reference range

:



                                                                 41 - 51 mmHg. T

he



                                                                 reference range



                                                                 was not used to



                                                                 interpret this



                                                                 result as



                                                                 normal/abnormal

.

 

             PO2 MARCELINA (test code =              See_Comment  HH            [Autom

ated



             3185321109)                                         message] The



                                                                 system which



                                                                 generated this



                                                                 result



                                                                 transmitted



                                                                 reference range

:



                                                                 25 - 40 mmHg. T

he



                                                                 reference range



                                                                 was not used to



                                                                 interpret this



                                                                 result as



                                                                 normal/abnormal

.

 

             HCO3 MARCELINA (test code              See_Comment  L             [Automa

huma



             = 1531910911)                                        message] The



                                                                 system which



                                                                 generated this



                                                                 result



                                                                 transmitted



                                                                 reference range

:



                                                                 24 - 28 mEq/L.



                                                                 The reference



                                                                 range was not



                                                                 used to interpr

et



                                                                 this result as



                                                                 normal/abnormal

.

 

             AC VBE(BEAKER) (test              mEq/L                     



             code = 8791781931)                                        

 

             THB MARCELINA (test code = 17.7 g/dL    13.5-18.0                 



             5723285245)                                         

 

             %O2HB MARCELINA (test code 93.8 %       52.0-63.0    H            



             = 4676173006)                                        

 

             %COHB MARCELINA (test code 2.1 %        0.0-1.5      H            



             = 3051943667)                                        

 

             %METHB MARCELINA (test 0.1 %        0.4-1.5      L            



             code = 7846172596)                                        

 

             VOL%O2 MARCELINA (test 23.3 %       6.0-12.0     H            



             code = 3794998485)                                        

 

             NA (test code = 142 mmol/L   135-145                   



             6592242253)                                         

 

             K+ (test code = 4.4 mmol/L   3.5-5.0                   



             0664882186)                                         

 

             AC CA IONZ (test 5.40 mg/dL   4.50-5.30    H            



             code = 0496468784)                                        

 

             GLUCOSE (test code = <20                 LL           



             5151657304)                                         

 

             LACTIC ACID (test 3.37 mmol/L  0.50-2.20    H            



             code = 4252206939)                                        

 

             MAL (test code = *ac gluc                               



             MAL)         unmeasurable                           

 

             Lab Interpretation Abnormal                               



             (test code =                                        



             27729-8)                                            



[facility]. METABOLIC PANEL (64067)2022 
04:59:53





             Test Item    Value        Reference Range Interpretation Comments

 

             NA (test code = 140 mmol/L   135-145                   



             7739248902)                                         

 

             K (test code = 4.8 mmol/L   3.5-5.0                   



             8312730722)                                         

 

             CL (test code = 100 mmol/L                       



             6288347139)                                         

 

             CO2 TOTAL (test code = 20 mmol/L    23-31        L            



             5077651529)                                         

 

             AGAP (test code =              2-16         H            



             7568637926)                                         

 

             BUN (test code = 14 mg/dL     7-23                      



             4997282866)                                         

 

             GLUCOSE (test code = 1083 mg/dL          HH           



             7985932926)                                         

 

             CREATININE (test code = 0.80 mg/dL   0.60-1.25                 



             8141426140)                                         

 

             TOTAL BILI (test code = 1.0 mg/dL    0.1-1.1                   



             9894947041)                                         

 

             CALCIUM (test code = 12.0 mg/dL   8.6-10.6     H            



             1330696472)                                         

 

             T PROTEIN (test code = 8.1 g/dL     6.3-8.2                   



             8473705615)                                         

 

             ALBUMIN (test code = 4.7 g/dL     3.5-5.0                   



             6569329455)                                         

 

             ALK PHOS (test code = 173 U/L             H            



             4354618907)                                         

 

             ALTv (test code = 36 U/L       5-50                      



             1742-6)                                             

 

             AST(SGOT) (test code = 18 U/L       13-40                     



             2090246541)                                         

 

             eGFR (test code =              mL/min/1.73m2              



             2023119312)                                         

 

             MAL (test code = MAL) Association of                           



                          Glomerular Filtration                           



                          Rate (GFR) and Staging                           



                          of Kidney Disease*                           



                          +---------------------                           



                          --+-------------------                           



                          --+-------------------                           



                          ------+| GFR                           



                          (mL/min/1.73 m2) ?|                           



                          With Kidney Damage ?|                           



                          ?Without Kidney                           



                          Damage+---------------                           



                          --------+-------------                           



                          --------+-------------                           



                          ------------+| ?>90 ?                           



                          ? ? ? ? ? ? ? ?|                           



                          ?Stage one ? ? ? ? ?|                           



                          ? Normal ? ? ? ? ? ? ?                           



                          ?+--------------------                           



                          ---+------------------                           



                          ---+------------------                           



                          -------+| ?60-89 ? ? ?                           



                          ? ? ? ? ?| ?Stage two                           



                          ? ? ? ? ?| ? Decreased                           



                          GFR ? ? ? ?                            



                          +---------------------                           



                          --+-------------------                           



                          --+-------------------                           



                          ------+| ?30-59 ? ? ?                           



                          ? ? ? ? ?| ?Stage                           



                          three ? ? ? ?| ? Stage                           



                          three ? ? ? ? ?                           



                          +---------------------                           



                          --+-------------------                           



                          --+-------------------                           



                          ------+| ?15-29 ? ? ?                           



                          ? ? ? ? ?| ?Stage four                           



                          ? ? ? ? | ? Stage four                           



                          ? ? ? ? ?                              



                          ?+--------------------                           



                          ---+------------------                           



                          ---+------------------                           



                          -------+| ?<15 (or                           



                          dialysis) ? ?| ?Stage                           



                          five ? ? ? ? | ? Stage                           



                          five ? ? ? ? ?                           



                          ?+--------------------                           



                          ---+------------------                           



                          ---+------------------                           



                          -------+ *Each stage                           



                          assumes the associated                           



                          GFR level has been in                           



                          effect for at least                           



                          three months. ?Stages                           



                          1 to 5, with or                           



                          without kidney                           



                          disease, indicate                           



                          chronic kidney                           



                          disease. Notes:                           



                          Determination of                           



                          stages one and two                           



                          (with eGFR                             



                          >59mL/min/1.73 m2)                           



                          requires estimation of                           



                          kidney damage for at                           



                          least three months as                           



                          defined by structural                           



                          or functional                           



                          abnormalities of the                           



                          kidney, manifested by                           



                          either:Pathological                           



                          abnormalities or                           



                          Markers of kidney                           



                          damage (including                           



                          abnormalities in the                           



                          composition of the                           



                          blood or urine or                           



                          abnormalities in                           



                          imaging tests).                           

 

             Lab Interpretation Abnormal                               



             (test code = 50594-3)                                        



[id number]





             Test Item    Value        Reference    Interpretation Comments



                                       Range                     

 

             TROPONIN I (test <0.012       See_Comment                [Automated



             code = 9708937256)                                        message] 

The



                                                                 system which



                                                                 generated this



                                                                 result



                                                                 transmitted



                                                                 reference range

:



                                                                 <=0.034 ng/mL.



                                                                 The reference



                                                                 range was not



                                                                 used to



                                                                 interpret this



                                                                 result as



                                                                 normal/abnormal

.

 

             MAL (test code = Reference (Normal)                           



             MAL)         Range (defined by                           



                          the 99th percentile                           



                          reference limit): <=                           



                          0.034 ng/mL Note:                           



                          Cardiac troponin                           



                          begins to rise 3-4                           



                          hours after the                           



                          onset of ischemia.                           



                          Repeat in 4-6 hours                           



                          if the sample was                           



                          drawn within 3-4                           



                          hours of the onset                           



                          of the symptom and                           



                          found normal.                           



                          Diagnosis of                           



                          myocardial injury is                           



                          made with acute                           



                          changes in cTn                           



                          concentrations with                           



                          at least one serial                           



                          sample above the                           



                          99th percentile                           



                          upper reference                           



                          limit (URL), taken                           



                          together with the                           



                          patient's clinical                           



                          presentation. Biotin                           



                          has been reported to                           



                          cause a negative                           



                          bias, interpret                           



                          results relative to                           



                          patient's use of                           



                          biotin.                                

 

             Lab Interpretation Normal                                 



             (test code =                                        



             05052-2)                                            



[id number]





             Test Item    Value        Reference Range Interpretation Comments

 

             NT-proBNP (test code 71 pg/mL     See_Comment                [Autom

ated



             = [id number])                                        message] The



                                                                 system which



                                                                 generated this



                                                                 result



                                                                 transmitted



                                                                 reference range

:



                                                                 <=125. The



                                                                 reference range



                                                                 was not used to



                                                                 interpret this



                                                                 result as



                                                                 normal/abnormal

.

 

             MAL (test code = MAL) Biotin has been                           



                          reported to                            



                          cause a negative                           



                          bias, interpret                           



                          results relative                           



                          to patient's use                           



                          of biotin.                             

 

             Lab Interpretation Normal                                 



             (test code = 01703-1)                                        



[id number] 03:48:23





             Test Item    Value        Reference Range Interpretation Comments

 

             NT-proBNP (test code 71 pg/mL     See_Comment                [Autom

ated



             = [id number])                                        message] The



                                                                 system which



                                                                 generated this



                                                                 result



                                                                 transmitted



                                                                 reference range

:



                                                                 <=125. The



                                                                 reference range



                                                                 was not used to



                                                                 interpret this



                                                                 result as



                                                                 normal/abnormal

.

 

             MAL (test code = MAL) Biotin has been                           



                          reported to                            



                          cause a negative                           



                          bias, interpret                           



                          results relative                           



                          to patient's use                           



                          of biotin.                             

 

             Lab Interpretation Normal                                 



             (test code = 51617-0)                                        



[facility]





             Test Item    Value        Reference Range Interpretation Comments

 

             LIPASE (test code = 1123277431) 120 U/L      0-220                 

    

 

             Lab Interpretation (test code = Normal                             

    



             54358-4)                                            



[id number] 
03:38:23





             Test Item    Value        Reference Range Interpretation Comments

 

             APTT Patient (test              See_Comment                [Automat

ed



             code = 3173-2)                                        message] The



                                                                 system which



                                                                 generated this



                                                                 result



                                                                 transmitted



                                                                 reference range

:



                                                                 23 - 38 Seconds

.



                                                                 The reference



                                                                 range was not



                                                                 used to interpr

et



                                                                 this result as



                                                                 normal/abnormal

.

 

             MAL (test code = MAL) The [facility] patient                           



                          population mean                           



                          normal value for                           



                          aPTT is 30                             



                          seconds.                               

 

             Lab Interpretation Normal                                 



             (test code = 68178-8)                                        



[facility] [id number] 
03:38:23





             Test Item    Value        Reference Range Interpretation Comments

 

             APTT Patient (test              See_Comment                [Automat

ed



             code = 3173-2)                                        message] The



                                                                 system which



                                                                 generated this



                                                                 result



                                                                 transmitted



                                                                 reference range

:



                                                                 23 - 38 Seconds

.



                                                                 The reference



                                                                 range was not



                                                                 used to interpr

et



                                                                 this result as



                                                                 normal/abnormal

.

 

             MAL (test code = MAL) The [facility] patient                           



                          population mean                           



                          normal value for                           



                          aPTT is 30                             



                          seconds.                               

 

             Lab Interpretation Normal                                 



             (test code = 65490-5)                                        



[facility] [id number] 03:36:22





             Test Item    Value        Reference Range Interpretation Comments

 

             PROTIME PATIENT (test              See_Comment                [Auto

mated message]



             code = 5964-2)                                        The system wh

ich



                                                                 generated this 

result



                                                                 transmitted ref

erence



                                                                 range: 12.0 - 1

4.7



                                                                 Seconds. The re

ference



                                                                 range was not u

sed to



                                                                 interpret this 

result



                                                                 as normal/abnor

mal.

 

             INR (test code = 6301-6)                                        Nor

mal INR <1.1;



                                                                 Warfarin Therap

eutic



                                                                 range 2.0 to 3.

0 or



                                                                 2.5 to 3.5, dep

ending



                                                                 upon the indica

tions.

 

             Lab Interpretation (test Normal                                 



             code = 70450-5)                                        



[facility] WITH [id number] 03:35:42





             Test Item    Value        Reference Range Interpretation Comments

 

             WBC (test code =              See_Comment  H             [Automated



             0690-2)                                             message] The



                                                                 system which



                                                                 generated this



                                                                 result transmit

huma



                                                                 reference range

:



                                                                 4.20 - 10.70



                                                                 10*3/?L. The



                                                                 reference range



                                                                 was not used to



                                                                 interpret this



                                                                 result as



                                                                 normal/abnormal

.

 

             RBC (test code =              See_Comment  H             [Automated



             789-8)                                              message] The



                                                                 system which



                                                                 generated this



                                                                 result transmit

huma



                                                                 reference range

:



                                                                 4.26 - 5.52



                                                                 10*6/?L. The



                                                                 reference range



                                                                 was not used to



                                                                 interpret this



                                                                 result as



                                                                 normal/abnormal

.

 

             HGB (test code = 17.8 g/dL    12.2-16.4    H            



             718-7)                                              

 

             HCT (test code = 51.7 %       38.4-49.3    H            



             4544-3)                                             

 

             MCV (test code = 86.9 fL      81.7-95.6                 



             787-2)                                              

 

             MCH (test code = 29.9 pg      26.1-32.7                 



             785-6)                                              

 

             MCHC (test code = 34.4 g/dL    31.2-35.0                 



             786-4)                                              

 

             RDW-SD (test code = 44.7 fL      38.5-51.6                 



             27324-3)                                            

 

             RDW-CV (test code = 14.2 %       12.1-15.4                 



             788-0)                                              

 

             PLT (test code =              See_Comment  H             [Automated



             777-3)                                              message] The



                                                                 system which



                                                                 generated this



                                                                 result transmit

huma



                                                                 reference range

:



                                                                 150 - 328 10*3/

?L.



                                                                 The reference



                                                                 range was not u

sed



                                                                 to interpret th

is



                                                                 result as



                                                                 normal/abnormal

.

 

             MPV (test code = 11.5 fL      9.8-13.0                  



             30343-5)                                            

 

             NRBC/100 WBC (test              See_Comment                [Automat

ed



             code = 6173455256)                                        message] 

The



                                                                 system which



                                                                 generated this



                                                                 result transmit

huma



                                                                 reference range

:



                                                                 0.0 - 10.0 /100



                                                                 WBCs. The



                                                                 reference range



                                                                 was not used to



                                                                 interpret this



                                                                 result as



                                                                 normal/abnormal

.

 

             NRBC x10^3 (test code <0.01        See_Comment                [Auto

mated



             = [id number])                                        message] The



                                                                 system which



                                                                 generated this



                                                                 result transmit

huma



                                                                 reference range

:



                                                                 10*3/?L. The



                                                                 reference range



                                                                 was not used to



                                                                 interpret this



                                                                 result as



                                                                 normal/abnormal

.

 

             GRAN MAT (NEUT) % 85.2 %                                 



             (test code = 770-8)                                        

 

             IMM GRAN % (test code 0.90 %                                 



             = [id number])                                        

 

             LYMPH % (test code = 5.9 %                                  



             736-9)                                              

 

             MONO % (test code = 7.8 %                                  



             5905-5)                                             

 

             EOS % (test code = 0.0 %                                  



             713-8)                                              

 

             BASO % (test code = 0.2 %                                  



             706-2)                                              

 

             GRAN MAT x10^3(ANC) 12.85 10*3/uL 1.99-6.95    H            



             (test code =                                        



             [id number])                                         

 

             IMM GRAN x10^3 (test 0.13 10*3/uL 0.00-0.06    H            



             code = 0685956694)                                        

 

             LYMPH x10^3 (test code 0.89 10*3/uL 1.09-3.23    L            



             = 731-0)                                            

 

             MONO x10^3 (test code 1.17 10*3/uL 0.36-1.02    H            



             = 742-7)                                            

 

             EOS x10^3 (test code = <0.03        0.06-0.53    L            



             711-2)                                              

 

             BASO x10^3 (test code 0.03 10*3/uL 0.01-0.09                 



             = 704-7)                                            

 

             Lab Interpretation Abnormal                               



             (test code = 29465-3)                                        



[id number]





             Test Item    Value        Reference Range Interpretation Comments

 

             LACTIC ACID (test code = 4.52 mmol/L  0.50-2.20    H            



             5557095058)                                         

 

             Lab Interpretation (test code = Abnormal                           

    



             80945-8)                                            



[facility] METABOLIC PANEL (NA, K, CL, CO2, 
GLUCOSE, BUN, CREATININE, CA)2022 11:13:09





             Test Item    Value        Reference Range Interpretation Comments

 

             NA (test code = 137 mmol/L   135-145                   



             3864684328)                                         

 

             K (test code = 4.8 mmol/L   3.5-5.0                   



             4744254468)                                         

 

             CL (test code = 101 mmol/L                       



             9821277497)                                         

 

             CO2 TOTAL (test code = 24 mmol/L    23-31                     



             4726527727)                                         

 

             AGAP (test code =              2-16                      



             3464155046)                                         

 

             BUN (test code = 17 mg/dL     7-23                      



             4676985297)                                         

 

             GLUCOSE (test code = 323 mg/dL           H            



             8454428860)                                         

 

             CREATININE (test code = 0.56 mg/dL   0.60-1.25    L            



             9815344484)                                         

 

             CALCIUM (test code = 9.6 mg/dL    8.6-10.6                  



             6667690266)                                         

 

             eGFR (test code =              mL/min/1.73m2              



             8535379838)                                         

 

             MAL (test code = MAL) Association of                           



                          Glomerular Filtration                           



                          Rate (GFR) and Staging                           



                          of Kidney Disease*                           



                          +---------------------                           



                          --+-------------------                           



                          --+-------------------                           



                          ------+| GFR                           



                          (mL/min/1.73 m2) ?|                           



                          With Kidney Damage ?|                           



                          ?Without Kidney                           



                          Damage+---------------                           



                          --------+-------------                           



                          --------+-------------                           



                          ------------+| ?>90 ?                           



                          ? ? ? ? ? ? ? ?|                           



                          ?Stage one ? ? ? ? ?|                           



                          ? Normal ? ? ? ? ? ? ?                           



                          ?+--------------------                           



                          ---+------------------                           



                          ---+------------------                           



                          -------+| ?60-89 ? ? ?                           



                          ? ? ? ? ?| ?Stage two                           



                          ? ? ? ? ?| ? Decreased                           



                          GFR ? ? ? ?                            



                          +---------------------                           



                          --+-------------------                           



                          --+-------------------                           



                          ------+| ?30-59 ? ? ?                           



                          ? ? ? ? ?| ?Stage                           



                          three ? ? ? ?| ? Stage                           



                          three ? ? ? ? ?                           



                          +---------------------                           



                          --+-------------------                           



                          --+-------------------                           



                          ------+| ?15-29 ? ? ?                           



                          ? ? ? ? ?| ?Stage four                           



                          ? ? ? ? | ? Stage four                           



                          ? ? ? ? ?                              



                          ?+--------------------                           



                          ---+------------------                           



                          ---+------------------                           



                          -------+| ?<15 (or                           



                          dialysis) ? ?| ?Stage                           



                          five ? ? ? ? | ? Stage                           



                          five ? ? ? ? ?                           



                          ?+--------------------                           



                          ---+------------------                           



                          ---+------------------                           



                          -------+ *Each stage                           



                          assumes the associated                           



                          GFR level has been in                           



                          effect for at least                           



                          three months. ?Stages                           



                          1 to 5, with or                           



                          without kidney                           



                          disease, indicate                           



                          chronic kidney                           



                          disease. Notes:                           



                          Determination of                           



                          stages one and two                           



                          (with eGFR                             



                          >59mL/min/1.73 m2)                           



                          requires estimation of                           



                          kidney damage for at                           



                          least three months as                           



                          defined by structural                           



                          or functional                           



                          abnormalities of the                           



                          kidney, manifested by                           



                          either:Pathological                           



                          abnormalities or                           



                          Markers of kidney                           



                          damage (including                           



                          abnormalities in the                           



                          composition of the                           



                          blood or urine or                           



                          abnormalities in                           



                          imaging tests).                           

 

             Lab Interpretation Abnormal                               



             (test code = 48599-0)                                        



[facility] WITH [id number] 10:49:07





             Test Item    Value        Reference Range Interpretation Comments

 

             WBC (test code =              See_Comment  H             [Automated



             6690-2)                                             message] The sy

stem



                                                                 which generated



                                                                 this result



                                                                 transmitted



                                                                 reference range

:



                                                                 4.20 - 10.70



                                                                 10*3/?L. The



                                                                 reference range

 was



                                                                 not used to



                                                                 interpret this



                                                                 result as



                                                                 normal/abnormal

.

 

             RBC (test code =              See_Comment                [Automated



             789-8)                                              message] The sy

stem



                                                                 which generated



                                                                 this result



                                                                 transmitted



                                                                 reference range

:



                                                                 4.26 - 5.52



                                                                 10*6/?L. The



                                                                 reference range

 was



                                                                 not used to



                                                                 interpret this



                                                                 result as



                                                                 normal/abnormal

.

 

             HGB (test code = 15.6 g/dL    12.2-16.4                 



             718-7)                                              

 

             HCT (test code = 46.6 %       38.4-49.3                 



             4544-3)                                             

 

             MCV (test code = 88.3 fL      81.7-95.6                 



             787-2)                                              

 

             MCH (test code = 29.5 pg      26.1-32.7                 



             785-6)                                              

 

             MCHC (test code = 33.5 g/dL    31.2-35.0                 



             786-4)                                              

 

             RDW-SD (test code = 46.7 fL      38.5-51.6                 



             60251-8)                                            

 

             RDW-CV (test code = 14.5 %       12.1-15.4                 



             788-0)                                              

 

             PLT (test code =              See_Comment  H             [Automated



             777-3)                                              message] The sy

stem



                                                                 which generated



                                                                 this result



                                                                 transmitted



                                                                 reference range

:



                                                                 150 - 328 10*3/

?L.



                                                                 The reference r

zhen



                                                                 was not used to



                                                                 interpret this



                                                                 result as



                                                                 normal/abnormal

.

 

             MPV (test code = 10.2 fL      9.8-13.0                  



             58436-8)                                            

 

             NRBC/100 WBC (test              See_Comment                [Automat

ed



             code = 9107679405)                                        message] 

The system



                                                                 which generated



                                                                 this result



                                                                 transmitted



                                                                 reference range

:



                                                                 0.0 - 10.0 /100



                                                                 WBCs. The refer

ence



                                                                 range was not u

sed



                                                                 to interpret th

is



                                                                 result as



                                                                 normal/abnormal

.

 

             NRBC x10^3 (test code <0.01        See_Comment                [Auto

mated



             = 6950135225)                                        message] The s

ystem



                                                                 which generated



                                                                 this result



                                                                 transmitted



                                                                 reference range

:



                                                                 10*3/?L. The



                                                                 reference range

 was



                                                                 not used to



                                                                 interpret this



                                                                 result as



                                                                 normal/abnormal

.

 

             GRAN MAT (NEUT) % 70.2 %                                 



             (test code = 770-8)                                        

 

             IMM GRAN % (test code 0.80 %                                 



             = 0536014297)                                        

 

             LYMPH % (test code = 18.0 %                                 



             736-9)                                              

 

             MONO % (test code = 8.5 %                                  



             5905-5)                                             

 

             EOS % (test code = 2.1 %                                  



             713-8)                                              

 

             BASO % (test code = 0.4 %                                  



             706-2)                                              

 

             GRAN MAT x10^3(ANC) 7.63 10*3/uL 1.99-6.95    H            



             (test code =                                        



             8096997044)                                         

 

             IMM GRAN x10^3 (test 0.09 10*3/uL 0.00-0.06    H            



             code = 7610797708)                                        

 

             LYMPH x10^3 (test code 1.96 10*3/uL 1.09-3.23                 



             = 731-0)                                            

 

             MONO x10^3 (test code 0.92 10*3/uL 0.36-1.02                 



             = 742-7)                                            

 

             EOS x10^3 (test code = 0.23 10*3/uL 0.06-0.53                 



             711-2)                                              

 

             BASO x10^3 (test code 0.04 10*3/uL 0.01-0.09                 



             = 704-7)                                            

 

             Lab Interpretation Abnormal                               



             (test code = 63506-6)                                        



[facility] METABOLIC PANEL (NA, K, CL, CO2, 
GLUCOSE, BUN, CREATININE, CA)2022 09:48:58





             Test Item    Value        Reference Range Interpretation Comments

 

             NA (test code = 135 mmol/L   135-145                   



             0833677370)                                         

 

             K (test code = 4.7 mmol/L   3.5-5.0                   



             0018508111)                                         

 

             CL (test code = 101 mmol/L                       



             0595198262)                                         

 

             CO2 TOTAL (test code = 23 mmol/L    23-31                     



             4308814181)                                         

 

             AGAP (test code =              2-16                      



             4292964839)                                         

 

             BUN (test code = 18 mg/dL     7-23                      



             0971494172)                                         

 

             GLUCOSE (test code = 346 mg/dL           H            



             0979749895)                                         

 

             CREATININE (test code = 0.64 mg/dL   0.60-1.25                 



             2024440402)                                         

 

             CALCIUM (test code = 9.1 mg/dL    8.6-10.6                  



             4462600685)                                         

 

             eGFR (test code =              mL/min/1.73m2              



             9299480634)                                         

 

             MAL (test code = MAL) Association of                           



                          Glomerular Filtration                           



                          Rate (GFR) and Staging                           



                          of Kidney Disease*                           



                          +---------------------                           



                          --+-------------------                           



                          --+-------------------                           



                          ------+| GFR                           



                          (mL/min/1.73 m2) ?|                           



                          With Kidney Damage ?|                           



                          ?Without Kidney                           



                          Damage+---------------                           



                          --------+-------------                           



                          --------+-------------                           



                          ------------+| ?>90 ?                           



                          ? ? ? ? ? ? ? ?|                           



                          ?Stage one ? ? ? ? ?|                           



                          ? Normal ? ? ? ? ? ? ?                           



                          ?+--------------------                           



                          ---+------------------                           



                          ---+------------------                           



                          -------+| ?60-89 ? ? ?                           



                          ? ? ? ? ?| ?Stage two                           



                          ? ? ? ? ?| ? Decreased                           



                          GFR ? ? ? ?                            



                          +---------------------                           



                          --+-------------------                           



                          --+-------------------                           



                          ------+| ?30-59 ? ? ?                           



                          ? ? ? ? ?| ?Stage                           



                          three ? ? ? ?| ? Stage                           



                          three ? ? ? ? ?                           



                          +---------------------                           



                          --+-------------------                           



                          --+-------------------                           



                          ------+| ?15-29 ? ? ?                           



                          ? ? ? ? ?| ?Stage four                           



                          ? ? ? ? | ? Stage four                           



                          ? ? ? ? ?                              



                          ?+--------------------                           



                          ---+------------------                           



                          ---+------------------                           



                          -------+| ?<15 (or                           



                          dialysis) ? ?| ?Stage                           



                          five ? ? ? ? | ? Stage                           



                          five ? ? ? ? ?                           



                          ?+--------------------                           



                          ---+------------------                           



                          ---+------------------                           



                          -------+ *Each stage                           



                          assumes the associated                           



                          GFR level has been in                           



                          effect for at least                           



                          three months. ?Stages                           



                          1 to 5, with or                           



                          without kidney                           



                          disease, indicate                           



                          chronic kidney                           



                          disease. Notes:                           



                          Determination of                           



                          stages one and two                           



                          (with eGFR                             



                          >59mL/min/1.73 m2)                           



                          requires estimation of                           



                          kidney damage for at                           



                          least three months as                           



                          defined by structural                           



                          or functional                           



                          abnormalities of the                           



                          kidney, manifested by                           



                          either:Pathological                           



                          abnormalities or                           



                          Markers of kidney                           



                          damage (including                           



                          abnormalities in the                           



                          composition of the                           



                          blood or urine or                           



                          abnormalities in                           



                          imaging tests).                           

 

             Lab Interpretation Abnormal                               



             (test code = 64633-4)                                        



[facility] WITH [id number] 09:06:55





             Test Item    Value        Reference Range Interpretation Comments

 

             WBC (test code =              See_Comment                [Automated



             4699-2)                                             message] The sy

stem



                                                                 which generated



                                                                 this result



                                                                 transmitted



                                                                 reference range

:



                                                                 4.20 - 10.70



                                                                 10*3/?L. The



                                                                 reference range

 was



                                                                 not used to



                                                                 interpret this



                                                                 result as



                                                                 normal/abnormal

.

 

             RBC (test code =              See_Comment                [Automated



             789-8)                                              message] The sy

stem



                                                                 which generated



                                                                 this result



                                                                 transmitted



                                                                 reference range

:



                                                                 4.26 - 5.52



                                                                 10*6/?L. The



                                                                 reference range

 was



                                                                 not used to



                                                                 interpret this



                                                                 result as



                                                                 normal/abnormal

.

 

             HGB (test code = 15.0 g/dL    12.2-16.4                 



             718-7)                                              

 

             HCT (test code = 44.8 %       38.4-49.3                 



             4544-3)                                             

 

             MCV (test code = 88.2 fL      81.7-95.6                 



             787-2)                                              

 

             MCH (test code = 29.5 pg      26.1-32.7                 



             785-6)                                              

 

             MCHC (test code = 33.5 g/dL    31.2-35.0                 



             786-4)                                              

 

             RDW-SD (test code = 47.7 fL      38.5-51.6                 



             41898-6)                                            

 

             RDW-CV (test code = 14.6 %       12.1-15.4                 



             788-0)                                              

 

             PLT (test code =              See_Comment                [Automated



             777-3)                                              message] The sy

stem



                                                                 which generated



                                                                 this result



                                                                 transmitted



                                                                 reference range

:



                                                                 150 - 328 10*3/

?L.



                                                                 The reference r

zhen



                                                                 was not used to



                                                                 interpret this



                                                                 result as



                                                                 normal/abnormal

.

 

             MPV (test code = 9.9 fL       9.8-13.0                  



             58456-3)                                            

 

             NRBC/100 WBC (test              See_Comment                [Automat

ed



             code = 0472826123)                                        message] 

The system



                                                                 which generated



                                                                 this result



                                                                 transmitted



                                                                 reference range

:



                                                                 0.0 - 10.0 /100



                                                                 WBCs. The refer

ence



                                                                 range was not u

sed



                                                                 to interpret th

is



                                                                 result as



                                                                 normal/abnormal

.

 

             NRBC x10^3 (test code <0.01        See_Comment                [Auto

mated



             = 8449078425)                                        message] The s

ystem



                                                                 which generated



                                                                 this result



                                                                 transmitted



                                                                 reference range

:



                                                                 10*3/?L. The



                                                                 reference range

 was



                                                                 not used to



                                                                 interpret this



                                                                 result as



                                                                 normal/abnormal

.

 

             GRAN MAT (NEUT) % 62.6 %                                 



             (test code = 770-8)                                        

 

             IMM GRAN % (test code 1.30 %                                 



             = 3243820501)                                        

 

             LYMPH % (test code = 23.2 %                                 



             736-9)                                              

 

             MONO % (test code = 9.6 %                                  



             5905-5)                                             

 

             EOS % (test code = 2.9 %                                  



             713-8)                                              

 

             BASO % (test code = 0.4 %                                  



             706-2)                                              

 

             GRAN MAT x10^3(ANC) 5.85 10*3/uL 1.99-6.95                 



             (test code =                                        



             5345170887)                                         

 

             IMM GRAN x10^3 (test 0.12 10*3/uL 0.00-0.06    H            



             code = 9783843844)                                        

 

             LYMPH x10^3 (test code 2.17 10*3/uL 1.09-3.23                 



             = 731-0)                                            

 

             MONO x10^3 (test code 0.90 10*3/uL 0.36-1.02                 



             = 742-7)                                            

 

             EOS x10^3 (test code = 0.27 10*3/uL 0.06-0.53                 



             711-2)                                              

 

             BASO x10^3 (test code 0.04 10*3/uL 0.01-0.09                 



             = 704-7)                                            

 

             Lab Interpretation Abnormal                               



             (test code = 55493-8)                                        



[facility] CULTURE [id number] 02:01:46





             Test Item    Value        Reference Range Interpretation Comments

 

             Blood Culture-Aerobic No organisms No growth                 Previo

us



             (test code = 17928-3) isolated                               prelim

inary



                                                                 verified result



                                                                 was Culture In



                                                                 Progress on



                                                                 2022 at 00

01



                                                                 CDTPrevious



                                                                 preliminary



                                                                 verified result



                                                                 was No growth a

t



                                                                 24 hours on



                                                                 2022 at 21

01



                                                                 CDTPrevious



                                                                 preliminary



                                                                 verified result



                                                                 was No growth a

t



                                                                 48 hours on



                                                                 2022 at 21

01



                                                                 CDTPrevious



                                                                 preliminary



                                                                 verified result



                                                                 was No growth a

t



                                                                 72 hours on



                                                                 2022 at 21

01



                                                                 CDT

 

             Blood        No organisms No growth                 Previous



             Culture-Anaerobic isolated                               preliminar

y



             (test code = 18532-4)                                        verifi

ed result



                                                                 was Culture In



                                                                 Progress on



                                                                 2022 at 00

01



                                                                 CDTPrevious



                                                                 preliminary



                                                                 verified result



                                                                 was No growth a

t



                                                                 24 hours on



                                                                 2022 at 21

01



                                                                 CDTPrevious



                                                                 preliminary



                                                                 verified result



                                                                 was No growth a

t



                                                                 48 hours on



                                                                 2022 at 21

01



                                                                 CDTPrevious



                                                                 preliminary



                                                                 verified result



                                                                 was No growth a

t



                                                                 72 hours on



                                                                 2022 at 21

01



                                                                 CDT

 

             Lab Interpretation Normal                                 



             (test code = 78912-7)                                        



[facility] CULTURE [id number] 02:01:46





             Test Item    Value        Reference Range Interpretation Comments

 

             Blood Culture-Aerobic No organisms No growth                 Previo

us



             (test code = 17928-3) isolated                               prelim

inary



                                                                 verified result



                                                                 was Culture In



                                                                 Progress on



                                                                 2022 at 00

01



                                                                 CDTPrevious



                                                                 preliminary



                                                                 verified result



                                                                 was No growth a

t



                                                                 24 hours on



                                                                 2022 at 21

01



                                                                 CDTPrevious



                                                                 preliminary



                                                                 verified result



                                                                 was No growth a

t



                                                                 48 hours on



                                                                 2022 at 21

01



                                                                 CDTPrevious



                                                                 preliminary



                                                                 verified result



                                                                 was No growth a

t



                                                                 72 hours on



                                                                 2022 at 21

01



                                                                 CDT

 

             Blood        No organisms No growth                 Previous



             Culture-Anaerobic isolated                               preliminar

y



             (test code = 61393-9)                                        verifi

ed result



                                                                 was Culture In



                                                                 Progress on



                                                                 2022 at 00

01



                                                                 CDTPrevious



                                                                 preliminary



                                                                 verified result



                                                                 was No growth a

t



                                                                 24 hours on



                                                                 2022 at 21

01



                                                                 CDTPrevious



                                                                 preliminary



                                                                 verified result



                                                                 was No growth a

t



                                                                 48 hours on



                                                                 2022 at 21

01



                                                                 CDTPrevious



                                                                 preliminary



                                                                 verified result



                                                                 was No growth a

t



                                                                 72 hours on



                                                                 2022 at 21

01



                                                                 CDT

 

             Lab Interpretation Normal                                 



             (test code = 94934-2)                                        



[id number]





             Test Item    Value        Reference Range Interpretation Comments

 

             NT-proBNP (test code 117 pg/mL    See_Comment                [Autom

ated



             = 5224994410)                                        message] The



                                                                 system which



                                                                 generated this



                                                                 result



                                                                 transmitted



                                                                 reference range

:



                                                                 <=125. The



                                                                 reference range



                                                                 was not used to



                                                                 interpret this



                                                                 result as



                                                                 normal/abnormal

.

 

             MAL (test code = MAL) Biotin has been                           



                          reported to                            



                          cause a negative                           



                          bias, interpret                           



                          results relative                           



                          to patient's use                           



                          of biotin.                             

 

             Lab Interpretation Normal                                 



             (test code = 56104-4)                                        



[id number] 
[facility]10:25





             Test Item    Value        Reference Range Interpretation Comments

 

             NA (test code = 137 mmol/L   135-145                   



             1399520203)                                         

 

             K (test code = 4.6 mmol/L   3.5-5.0                   



             8039509806)                                         

 

             CL (test code = 108 mmol/L                       



             6088942560)                                         

 

             CO2 TOTAL (test code = 20 mmol/L    23-31        L            



             3720433355)                                         

 

             AGAP (test code =              2-16                      



             7606162034)                                         

 

             BUN (test code = 12 mg/dL     7-23                      



             0115602605)                                         

 

             GLUCOSE (test code = 186 mg/dL           H            



             5583772979)                                         

 

             CREATININE (test code = 0.49 mg/dL   0.60-1.25    L            



             6986410556)                                         

 

             TOTAL BILI (test code = 0.7 mg/dL    0.1-1.1                   



             2165152737)                                         

 

             CALCIUM (test code = 8.7 mg/dL    8.6-10.6                  



             3423873806)                                         

 

             T PROTEIN (test code = 6.9 g/dL     6.3-8.2                   



             0159248492)                                         

 

             ALBUMIN (test code = 3.7 g/dL     3.5-5.0                   



             8577960006)                                         

 

             ALK PHOS (test code = 114 U/L                          



             0283295125)                                         

 

             ALTv (test code = 75 U/L       5-50         H            



             1742-6)                                             

 

             AST(SGOT) (test code = 27 U/L       13-40                     



             0227223821)                                         

 

             eGFR (test code =              mL/min/1.73m2              



             4602335995)                                         

 

             MAL (test code = MAL) Association of                           



                          Glomerular Filtration                           



                          Rate (GFR) and Staging                           



                          of Kidney Disease*                           



                          +---------------------                           



                          --+-------------------                           



                          --+-------------------                           



                          ------+| GFR                           



                          (mL/min/1.73 m2) ?|                           



                          With Kidney Damage ?|                           



                          ?Without Kidney                           



                          Damage+---------------                           



                          --------+-------------                           



                          --------+-------------                           



                          ------------+| ?>90 ?                           



                          ? ? ? ? ? ? ? ?|                           



                          ?Stage one ? ? ? ? ?|                           



                          ? Normal ? ? ? ? ? ? ?                           



                          ?+--------------------                           



                          ---+------------------                           



                          ---+------------------                           



                          -------+| ?60-89 ? ? ?                           



                          ? ? ? ? ?| ?Stage two                           



                          ? ? ? ? ?| ? Decreased                           



                          GFR ? ? ? ?                            



                          +---------------------                           



                          --+-------------------                           



                          --+-------------------                           



                          ------+| ?30-59 ? ? ?                           



                          ? ? ? ? ?| ?Stage                           



                          three ? ? ? ?| ? Stage                           



                          three ? ? ? ? ?                           



                          +---------------------                           



                          --+-------------------                           



                          --+-------------------                           



                          ------+| ?15-29 ? ? ?                           



                          ? ? ? ? ?| ?Stage four                           



                          ? ? ? ? | ? Stage four                           



                          ? ? ? ? ?                              



                          ?+--------------------                           



                          ---+------------------                           



                          ---+------------------                           



                          -------+| ?<15 (or                           



                          dialysis) ? ?| ?Stage                           



                          five ? ? ? ? | ? Stage                           



                          five ? ? ? ? ?                           



                          ?+--------------------                           



                          ---+------------------                           



                          ---+------------------                           



                          -------+ *Each stage                           



                          assumes the associated                           



                          GFR level has been in                           



                          effect for at least                           



                          three months. ?Stages                           



                          1 to 5, with or                           



                          without kidney                           



                          disease, indicate                           



                          chronic kidney                           



                          disease. Notes:                           



                          Determination of                           



                          stages one and two                           



                          (with eGFR                             



                          >59mL/min/1.73 m2)                           



                          requires estimation of                           



                          kidney damage for at                           



                          least three months as                           



                          defined by structural                           



                          or functional                           



                          abnormalities of the                           



                          kidney, manifested by                           



                          either:Pathological                           



                          abnormalities or                           



                          Markers of kidney                           



                          damage (including                           



                          abnormalities in the                           



                          composition of the                           



                          blood or urine or                           



                          abnormalities in                           



                          imaging tests).                           

 

             Lab Interpretation Abnormal                               



             (test code = 03341-6)                                        



[facility] WITH [id number] 11:14:25





             Test Item    Value        Reference Range Interpretation Comments

 

             WBC (test code =              See_Comment                [Automated



             6690-2)                                             message] The sy

stem



                                                                 which generated



                                                                 this result



                                                                 transmitted



                                                                 reference range

:



                                                                 4.20 - 10.70



                                                                 10*3/?L. The



                                                                 reference range

 was



                                                                 not used to



                                                                 interpret this



                                                                 result as



                                                                 normal/abnormal

.

 

             RBC (test code =              See_Comment                [Automated



             789-8)                                              message] The sy

stem



                                                                 which generated



                                                                 this result



                                                                 transmitted



                                                                 reference range

:



                                                                 4.26 - 5.52



                                                                 10*6/?L. The



                                                                 reference range

 was



                                                                 not used to



                                                                 interpret this



                                                                 result as



                                                                 normal/abnormal

.

 

             HGB (test code = 14.8 g/dL    12.2-16.4                 



             718-7)                                              

 

             HCT (test code = 44.8 %       38.4-49.3                 



             4544-3)                                             

 

             MCV (test code = 89.1 fL      81.7-95.6                 



             787-2)                                              

 

             MCH (test code = 29.4 pg      26.1-32.7                 



             785-6)                                              

 

             MCHC (test code = 33.0 g/dL    31.2-35.0                 



             786-4)                                              

 

             RDW-SD (test code = 48.7 fL      38.5-51.6                 



             97702-5)                                            

 

             RDW-CV (test code = 15.0 %       12.1-15.4                 



             788-0)                                              

 

             PLT (test code =              See_Comment                [Automated



             777-3)                                              message] The sy

stem



                                                                 which generated



                                                                 this result



                                                                 transmitted



                                                                 reference range

:



                                                                 150 - 328 10*3/

?L.



                                                                 The reference r

zhen



                                                                 was not used to



                                                                 interpret this



                                                                 result as



                                                                 normal/abnormal

.

 

             MPV (test code = 10.4 fL      9.8-13.0                  



             88446-6)                                            

 

             NRBC/100 WBC (test              See_Comment                [Automat

ed



             code = 8791994245)                                        message] 

The system



                                                                 which generated



                                                                 this result



                                                                 transmitted



                                                                 reference range

:



                                                                 0.0 - 10.0 /100



                                                                 WBCs. The refer

ence



                                                                 range was not u

sed



                                                                 to interpret th

is



                                                                 result as



                                                                 normal/abnormal

.

 

             NRBC x10^3 (test code <0.01        See_Comment                [Auto

mated



             = [id number])                                        message] The s

ystem



                                                                 which generated



                                                                 this result



                                                                 transmitted



                                                                 reference range

:



                                                                 10*3/?L. The



                                                                 reference range

 was



                                                                 not used to



                                                                 interpret this



                                                                 result as



                                                                 normal/abnormal

.

 

             GRAN MAT (NEUT) % 65.5 %                                 



             (test code = 770-8)                                        

 

             IMM GRAN % (test code 0.80 %                                 



             = 3207052519)                                        

 

             LYMPH % (test code = 20.9 %                                 



             736-9)                                              

 

             MONO % (test code = 9.7 %                                  



             5905-5)                                             

 

             EOS % (test code = 2.7 %                                  



             713-8)                                              

 

             BASO % (test code = 0.4 %                                  



             706-2)                                              

 

             GRAN MAT x10^3(ANC) 5.41 10*3/uL 1.99-6.95                 



             (test code =                                        



             5834712473)                                         

 

             IMM GRAN x10^3 (test 0.07 10*3/uL 0.00-0.06    H            



             code = 4008600755)                                        

 

             LYMPH x10^3 (test code 1.73 10*3/uL 1.09-3.23                 



             = 731-0)                                            

 

             MONO x10^3 (test code 0.80 10*3/uL 0.36-1.02                 



             = 742-7)                                            

 

             EOS x10^3 (test code = 0.22 10*3/uL 0.06-0.53                 



             711-2)                                              

 

             BASO x10^3 (test code 0.03 10*3/uL 0.01-0.09                 



             = 704-7)                                            

 

             Lab Interpretation Abnormal                               



             (test code = 63716-0)                                        



[id number]





             Test Item    Value        Reference Range Interpretation Comments

 

             NT-proBNP (test code 157 pg/mL    See_Comment  H             [Autom

ated



             = 1728031398)                                        message] The



                                                                 system which



                                                                 generated this



                                                                 result



                                                                 transmitted



                                                                 reference range

:



                                                                 <=125. The



                                                                 reference range



                                                                 was not used to



                                                                 interpret this



                                                                 result as



                                                                 normal/abnormal

.

 

             MAL (test code = MAL) Biotin has been                           



                          reported to                            



                          cause a negative                           



                          bias, interpret                           



                          results relative                           



                          to patient's use                           



                          of biotin.                             

 

             Lab Interpretation Abnormal                               



             (test code = 18366-1)                                        



[id number] 
[facility]:08:45





             Test Item    Value        Reference Range Interpretation Comments

 

             NA (test code = 141 mmol/L   135-145                   



             6031930314)                                         

 

             K (test code = 4.2 mmol/L   3.5-5.0                   



             0297229815)                                         

 

             CL (test code = 110 mmol/L          H            



             6911330215)                                         

 

             CO2 TOTAL (test code = 24 mmol/L    23-31                     



             6191204398)                                         

 

             AGAP (test code =              2-16                      



             5177022021)                                         

 

             BUN (test code = 11 mg/dL     7-23                      



             4571472885)                                         

 

             GLUCOSE (test code = 142 mg/dL           H            



             7892331504)                                         

 

             CREATININE (test code = 0.61 mg/dL   0.60-1.25                 



             8721610883)                                         

 

             TOTAL BILI (test code = 0.7 mg/dL    0.1-1.1                   



             4375883835)                                         

 

             CALCIUM (test code = 8.5 mg/dL    8.6-10.6     L            



             1984023549)                                         

 

             T PROTEIN (test code = 6.7 g/dL     6.3-8.2                   



             0049478413)                                         

 

             ALBUMIN (test code = 3.6 g/dL     3.5-5.0                   



             9234784973)                                         

 

             ALK PHOS (test code = 120 U/L                          



             8787102860)                                         

 

             ALTv (test code = 88 U/L       5-50         H            



             1742-6)                                             

 

             AST(SGOT) (test code = 27 U/L       13-40                     



             1888672091)                                         

 

             eGFR (test code =              mL/min/1.73m2              



             7277965524)                                         

 

             MAL (test code = MAL) Association of                           



                          Glomerular Filtration                           



                          Rate (GFR) and Staging                           



                          of Kidney Disease*                           



                          +---------------------                           



                          --+-------------------                           



                          --+-------------------                           



                          ------+| GFR                           



                          (mL/min/1.73 m2) ?|                           



                          With Kidney Damage ?|                           



                          ?Without Kidney                           



                          Damage+---------------                           



                          --------+-------------                           



                          --------+-------------                           



                          ------------+| ?>90 ?                           



                          ? ? ? ? ? ? ? ?|                           



                          ?Stage one ? ? ? ? ?|                           



                          ? Normal ? ? ? ? ? ? ?                           



                          ?+--------------------                           



                          ---+------------------                           



                          ---+------------------                           



                          -------+| ?60-89 ? ? ?                           



                          ? ? ? ? ?| ?Stage two                           



                          ? ? ? ? ?| ? Decreased                           



                          GFR ? ? ? ?                            



                          +---------------------                           



                          --+-------------------                           



                          --+-------------------                           



                          ------+| ?30-59 ? ? ?                           



                          ? ? ? ? ?| ?Stage                           



                          three ? ? ? ?| ? Stage                           



                          three ? ? ? ? ?                           



                          +---------------------                           



                          --+-------------------                           



                          --+-------------------                           



                          ------+| ?15-29 ? ? ?                           



                          ? ? ? ? ?| ?Stage four                           



                          ? ? ? ? | ? Stage four                           



                          ? ? ? ? ?                              



                          ?+--------------------                           



                          ---+------------------                           



                          ---+------------------                           



                          -------+| ?<15 (or                           



                          dialysis) ? ?| ?Stage                           



                          five ? ? ? ? | ? Stage                           



                          five ? ? ? ? ?                           



                          ?+--------------------                           



                          ---+------------------                           



                          ---+------------------                           



                          -------+ *Each stage                           



                          assumes the associated                           



                          GFR level has been in                           



                          effect for at least                           



                          three months. ?Stages                           



                          1 to 5, with or                           



                          without kidney                           



                          disease, indicate                           



                          chronic kidney                           



                          disease. Notes:                           



                          Determination of                           



                          stages one and two                           



                          (with eGFR                             



                          >59mL/min/1.73 m2)                           



                          requires estimation of                           



                          kidney damage for at                           



                          least three months as                           



                          defined by structural                           



                          or functional                           



                          abnormalities of the                           



                          kidney, manifested by                           



                          either:Pathological                           



                          abnormalities or                           



                          Markers of kidney                           



                          damage (including                           



                          abnormalities in the                           



                          composition of the                           



                          blood or urine or                           



                          abnormalities in                           



                          imaging tests).                           

 

             Lab Interpretation Abnormal                               



             (test code = 47536-5)                                        



[id number]





             Test Item    Value        Reference Range Interpretation Comments

 

             MAGNESIUM (test code = 3250473499) 1.7 mg/dL    1.7-2.4            

       

 

             Lab Interpretation (test code = Normal                             

    



             46509-0)                                            



[facility] WITH [id number] 11:57:56





             Test Item    Value        Reference Range Interpretation Comments

 

             WBC (test code =              See_Comment                [Automated



             1090-2)                                             message] The sy

stem



                                                                 which generated



                                                                 this result



                                                                 transmitted



                                                                 reference range

:



                                                                 4.20 - 10.70



                                                                 10*3/?L. The



                                                                 reference range

 was



                                                                 not used to



                                                                 interpret this



                                                                 result as



                                                                 normal/abnormal

.

 

             RBC (test code =              See_Comment                [Automated



             829-8)                                              message] The sy

stem



                                                                 which generated



                                                                 this result



                                                                 transmitted



                                                                 reference range

:



                                                                 4.26 - 5.52



                                                                 10*6/?L. The



                                                                 reference range

 was



                                                                 not used to



                                                                 interpret this



                                                                 result as



                                                                 normal/abnormal

.

 

             HGB (test code = 14.6 g/dL    12.2-16.4                 



             718-7)                                              

 

             HCT (test code = 43.5 %       38.4-49.3                 



             4544-3)                                             

 

             MCV (test code = 88.4 fL      81.7-95.6                 



             787-2)                                              

 

             MCH (test code = 29.7 pg      26.1-32.7                 



             785-6)                                              

 

             MCHC (test code = 33.6 g/dL    31.2-35.0                 



             786-4)                                              

 

             RDW-SD (test code = 48.9 fL      38.5-51.6                 



             53465-5)                                            

 

             RDW-CV (test code = 14.9 %       12.1-15.4                 



             788-0)                                              

 

             PLT (test code =              See_Comment                [Automated



             237-3)                                              message] The sy

stem



                                                                 which generated



                                                                 this result



                                                                 transmitted



                                                                 reference range

:



                                                                 150 - 328 10*3/

?L.



                                                                 The reference r

zhen



                                                                 was not used to



                                                                 interpret this



                                                                 result as



                                                                 normal/abnormal

.

 

             MPV (test code = 9.4 fL       9.8-13.0     L            



             97647-9)                                            

 

             NRBC/100 WBC (test              See_Comment                [Automat

ed



             code = 0236379800)                                        message] 

The system



                                                                 which generated



                                                                 this result



                                                                 transmitted



                                                                 reference range

:



                                                                 0.0 - 10.0 /100



                                                                 WBCs. The refer

ence



                                                                 range was not u

sed



                                                                 to interpret th

is



                                                                 result as



                                                                 normal/abnormal

.

 

             NRBC x10^3 (test code <0.01        See_Comment                [Auto

mated



             = [id number])                                        message] The s

ystem



                                                                 which generated



                                                                 this result



                                                                 transmitted



                                                                 reference range

:



                                                                 10*3/?L. The



                                                                 reference range

 was



                                                                 not used to



                                                                 interpret this



                                                                 result as



                                                                 normal/abnormal

.

 

             GRAN MAT (NEUT) % 63.9 %                                 



             (test code = 770-8)                                        

 

             IMM GRAN % (test code 0.70 %                                 



             = [id number])                                        

 

             LYMPH % (test code = 22.7 %                                 



             736-9)                                              

 

             MONO % (test code = 9.6 %                                  



             5905-5)                                             

 

             EOS % (test code = 2.7 %                                  



             713-8)                                              

 

             BASO % (test code = 0.4 %                                  



             706-2)                                              

 

             GRAN MAT x10^3(ANC) 4.75 10*3/uL 1.99-6.95                 



             (test code =                                        



             4210423779)                                         

 

             IMM GRAN x10^3 (test 0.05 10*3/uL 0.00-0.06                 



             code = 2724195886)                                        

 

             LYMPH x10^3 (test code 1.69 10*3/uL 1.09-3.23                 



             = 731-0)                                            

 

             MONO x10^3 (test code 0.71 10*3/uL 0.36-1.02                 



             = 742-7)                                            

 

             EOS x10^3 (test code = 0.20 10*3/uL 0.06-0.53                 



             711-2)                                              

 

             BASO x10^3 (test code 0.03 10*3/uL 0.01-0.09                 



             = 704-7)                                            

 

             Lab Interpretation Abnormal                               



             (test code = 10586-6)                                        



[id number]





             Test Item    Value        Reference Range Interpretation Comments

 

             VIT B12 (test code = 249 pg/mL    240-930                   



             [id number])                                         

 

             MAL (test code = MAL) Biotin has been                           



                          reported to cause a                           



                          positive bias,                           



                          interpret results                           



                          relative to                            



                          patient's use of                           



                          biotin.                                

 

             Lab Interpretation (test Normal                                 



             code = 21580-4)                                        



[facility] [id number]18:39





             Test Item    Value        Reference Range Interpretation Comments

 

             VIT B12 (test code = 249 pg/mL    240-930                   



             [id number])                                         

 

             MAL (test code = MAL) Biotin has been                           



                          reported to cause a                           



                          positive bias,                           



                          interpret results                           



                          relative to                            



                          patient's use of                           



                          biotin.                                

 

             Lab Interpretation (test Normal                                 



             code = 20328-9)                                        



[facility] [id number] 17:30:28





             Test Item    Value        Reference Range Interpretation Comments

 

             VIT D 25OH (test code = 16 ng/mL     25-80        L            



             04544-7)                                            

 

             MAL (test code = MAL) Deficiency: <20                           



                          ng/mLInsufficiency:                           



                          20-24 ng/mLOptimal:                           



                          25-80 ng/mL                            

 

             Lab Interpretation (test Abnormal                               



             code = 74247-3)                                        



[id number] 17:30:28





             Test Item    Value        Reference Range Interpretation Comments

 

             VIT D 25OH (test code = 16 ng/mL     25-80        L            



             35271-8)                                            

 

             MAL (test code = MAL) Deficiency: <20                           



                          ng/mLInsufficiency:                           



                          20-24 ng/mLOptimal:                           



                          25-80 ng/mL                            

 

             Lab Interpretation (test Abnormal                               



             code = 40957-0)                                        



[facility]





             Test Item    Value        Reference    Interpretation Comments



                                       Range                     

 

             Procalcitonin (test 0.04 ng/mL   See_Comment                [Automa

huma



             code = 4682504681)                                        message] 

The



                                                                 system which



                                                                 generated this



                                                                 result



                                                                 transmitted



                                                                 reference



                                                                 range: <=0.07.



                                                                 The reference



                                                                 range was not



                                                                 used to



                                                                 interpret this



                                                                 result as



                                                                 normal/abnormal



                                                                 .

 

             MAL (test code = INTERPRETATION OF                           



             MAL)         PROCALCITONIN RESULTS                           



                          IN ADULTS >= 18 YEARS                           



                          OF AGE Initiation and                           



                          discontinuation of                           



                          antibiotics on                           



                          patients with                           



                          suspected or confirmed                           



                          Lower Respiratory                           



                          Tract Infection in                           



                          Adults >= 18 years of                           



                          age.                                   



                          +--------------+------                           



                          ----------+-----------                           



                          ----+-----------------                           



                          -----------+|Procalcit                           



                          onin |Interpretation                           



                          ?|Antibiotic ? ?                           



                          |Considerations ? ? ?                           



                          ? ? ? ? |ng/mL ? ? ? ?                           



                          | ? ? ? ? ? ? ?                           



                          ?|recommendation | ? ?                           



                          ? ? ? ? ? ? ? ? ? ? ?                           



                          ?                                      



                          +--------------+------                           



                          ----------+-----------                           



                          ----+-----------------                           



                          -----------+| <0.1 ? ?                           



                          ? ? | Bacterial ? ? ?|                           



                          Strongly ? ? ?| ? ? ?                           



                          ? ? ? ? ? ? ? ? ? ? ?                           



                          | ? ? ? ? ? ? ?|                           



                          infection very |                           



                          discouraged ? |                           



                          Overruling: ? ? ? ? ?                           



                          ? ? ? | ? ? ? ? ? ? ?|                           



                          unlikely ? ? ? | ? ? ?                           



                          ? ? ? ? | ? Clinically                           



                          unstable ? ? ?                           



                          +--------------+------                           



                          ----------+-----------                           



                          ----+ ? High risk for                           



                          adverse ? ? | <0.25 ?                           



                          ? ? ?| Bacterial ? ?                           



                          ?| Discouraged ? | ?                           



                          outcome ? ? ? ? ? ? ?                           



                          ? ? | ? ? ? ? ? ? ?|                           



                          infection ? ? ?| ? ? ?                           



                          ? ? ? ? | ? SEE                           



                          IMPORTANT NOTE ? ? ?                           



                          ?| ? ? ? ? ? ? ?|                           



                          unlikely ? ? ? | ? ? ?                           



                          ? ? ? ? | ? ? ? ? ? ?                           



                          ? ? ? ? ? ? ? ?                           



                          +--------------+------                           



                          ----------+-----------                           



                          ----+-----------------                           



                          -----------+| >=0.25 ?                           



                          ? ? | Bacterial ? ? ?|                           



                          Encouraged ? ?| ? ? ?                           



                          ? ? ? ? ? ? ? ? ? ? ?                           



                          | ? ? ? ? ? ? ?|                           



                          infection ? ? ?| ? ? ?                           



                          ? ? ? ? | ? ? ? ? ? ?                           



                          ? ? ? ? ? ? ? ? | ? ?                           



                          ? ? ? ? ?| likely ? ?                           



                          ? ? | ? ? ? ? ? ? ? |                           



                          Consider treatment                           



                          failure                                



                          ?+--------------+-----                           



                          -----------+----------                           



                          -----+ if levels does                           



                          not decrease | >0.5 ?                           



                          ? ? ? | Bacterial ? ?                           



                          ?| Strongly ? ? ?|                           



                          appropriately ? ? ? ?                           



                          ? ? ? | ? ? ? ? ? ? ?|                           



                          infection very |                           



                          encouraged ? ?| ? ? ?                           



                          ? ? ? ? ? ? ? ? ? ? ?                           



                          | ? ? ? ? ? ? ?|                           



                          likely ? ? ? ? | ? ? ?                           



                          ? ? ? ? | ? ? ? ? ? ?                           



                          ? ? ? ? ? ? ? ?                           



                          +--------------+------                           



                          ----------+-----------                           



                          ----+-----------------                           



                          -----------+                           



                          Discontinuation of                           



                          antibiotics in                           



                          high-acuity patients                           



                          with suspected or                           



                          confirmed sepsis in                           



                          Adults >= 18 years of                           



                          age.                                   



                          +--------------+------                           



                          ----------+-----------                           



                          ----+-----------------                           



                          -----------+|Procalcit                           



                          onin |Interpretation                           



                          ?|Antibiotic ? ?                           



                          |Considerations ? ? ?                           



                          ? ? ? ? |ng/mL ? ? ? ?                           



                          | ? ? ? ? ? ? ?                           



                          ?|recommendation | ? ?                           



                          ? ? ? ? ? ? ? ? ? ? ?                           



                          ?                                      



                          +--------------+------                           



                          ----------+-----------                           



                          ----+-----------------                           



                          -----------+| <0.25 ?                           



                          ? ? ?| Bacterial ? ?                           



                          ?| Strongly ? ? ?| ? ?                           



                          ? ? ? ? ? ? ? ? ? ? ?                           



                          ? | ? ? ? ? ? ? ?|                           



                          infection very |                           



                          discouraged ? |                           



                          Overruling: ? ? ? ? ?                           



                          ? ? ? | ? ? ? ? ? ? ?|                           



                          unlikely ? ? ? | ? ? ?                           



                          ? ? ? ? | ? Clinically                           



                          unstable ? ? ?                           



                          +--------------+------                           



                          ----------+-----------                           



                          ----+ ? High risk for                           



                          adverse ? ? | <0.5 or                           



                          drop | Bacterial ? ?                           



                          ?| Discouraged ? | ?                           



                          outcome ? ? ? ? ? ? ?                           



                          ? ? | >80% from ? ?|                           



                          infection ? ? ?| ? ? ?                           



                          ? ? ? ? | ? SEE                           



                          IMPORTANT NOTE ? ? ?                           



                          ?| highest PCT ?|                           



                          unlikely ? ? ? | ? ? ?                           



                          ? ? ? ? | ? ? ? ? ? ?                           



                          ? ? ? ? ? ? ? ? |                           



                          level ? ? ? ?| ? ? ? ?                           



                          ? ? ? ?| ? ? ? ? ? ? ?                           



                          | ? ? ? ? ? ? ? ? ? ?                           



                          ? ? ? ?                                



                          +--------------+------                           



                          ----------+-----------                           



                          ----+-----------------                           



                          -----------+| >=0.5 ?                           



                          ? ? ?| Bacterial ? ?                           



                          ?| Encouraged ? ?| ? ?                           



                          ? ? ? ? ? ? ? ? ? ? ?                           



                          ? | ? ? ? ? ? ? ?|                           



                          infection ? ? ?| ? ? ?                           



                          ? ? ? ? | ? ? ? ? ? ?                           



                          ? ? ? ? ? ? ? ? | ? ?                           



                          ? ? ? ? ?| likely ? ?                           



                          ? ? | ? ? ? ? ? ? ? |                           



                          Consider treatment                           



                          failure                                



                          ?+--------------+-----                           



                          -----------+----------                           



                          -----+ if levels does                           



                          not decrease | >1.0 ?                           



                          ? ? ? | Bacterial ? ?                           



                          ?| Strongly ? ? ?|                           



                          appropriately ? ? ? ?                           



                          ? ? ? | ? ? ? ? ? ? ?|                           



                          infection very |                           



                          encouraged ? ?| ? ? ?                           



                          ? ? ? ? ? ? ? ? ? ? ?                           



                          | ? ? ? ? ? ? ?|                           



                          likely ? ? ? ? | ? ? ?                           



                          ? ? ? ? | ? ? ? ? ? ?                           



                          ? ? ? ? ? ? ? ?                           



                          +--------------+------                           



                          ----------+-----------                           



                          ----+-----------------                           



                          -----------+                           



                          Percentage of drop of                           



                          Procalcitonin                           



                          calculation for                           



                          Discontinuation of                           



                          antibiotics in                           



                          high-acuity patients                           



                          with suspected or                           



                          confirmed sepsis in                           



                          Adults >= 18 years of                           



                          age. ? ? ? ? ? ? ? ? ?                           



                          ? ? Procalcitonin                           



                          highest{}-Procalcitoni                           



                          n current{}Delta                           



                          Procalcitonin =                           



                          ______________________                           



                          ______________________                           



                          ___ x100% ? ? ? ? ? ?                           



                          ? ? ? ? ? ? ? ? ? ?                           



                          Procalcitonin current                           



                          {} IMPORTANT NOTE:                           



                          Procalcitonin may be                           



                          elevated without                           



                          bacterial infection by                           



                          physiologic stress                           



                          related to trauma,                           



                          burns, chronic                           



                          dialysis, metastatic                           



                          cancer, surgery in the                           



                          past seven days,                           



                          malaria, some fungal                           



                          infections, and some                           



                          forms of vasculitis.                           



                          The interpretation                           



                          algorithm may not                           



                          apply to patients with                           



                          immunosuppression                           



                          (equivalent of >10 mg                           



                          of prednisone daily),                           



                          HIV with CD4 cell                           



                          count < 350 cells/mm3,                           



                          active malignancy on                           



                          systemic chemotherapy,                           



                          solid organ transplant                           



                          or hematopoietic stem                           



                          cell transplantation,                           



                          or hospital acquired                           



                          pneumonia.                             



                          Additionally, some                           



                          clinical trials of                           



                          procalcitonin have                           



                          excluded patients with                           



                          shock requiring                           



                          vasopressor use, acute                           



                          respiratory failure                           



                          requiring mechanical                           



                          ventilation, or those                           



                          with known lung                           



                          abscess/empyema. For                           



                          further information                           



                          please refer                           



                          to:[email or web address]                           



                          [email or web address]                           



                          p                                      

 

             Lab Interpretation Normal                                 



             (test code =                                        



             14247-0)                                            



[facility]





             Test Item    Value        Reference Range Interpretation Comments

 

             Procalcitonin (test 0.04 ng/mL   <0.07                     



             code = [id number])                                        

 

             MAL (test code = MAL) INTERPRETATION OF                           



                          PROCALCITONIN RESULTS IN                           



                          ADULTS >= 18 YEARS OF                           



                          AGE Initiation and                           



                          discontinuation of                           



                          antibiotics on patients                           



                          with suspected or                           



                          confirmed Lower                           



                          Respiratory Tract                           



                          Infection in Adults >=                           



                          18 years of age.                           



                          +--------------+--------                           



                          --------+---------------                           



                          +-----------------------                           



                          -----+|Procalcitonin                           



                          |Interpretation                           



                          ?|Antibiotic ? ?                           



                          |Considerations ? ? ? ?                           



                          ? ? ? |ng/mL ? ? ? ? | ?                           



                          ? ? ? ? ? ?                            



                          ?|recommendation | ? ? ?                           



                          ? ? ? ? ? ? ? ? ? ? ?                           



                          +--------------+--------                           



                          --------+---------------                           



                          +-----------------------                           



                          -----+| <0.1 ? ? ? ? |                           



                          Bacterial ? ? ?|                           



                          Strongly ? ? ?| ? ? ? ?                           



                          ? ? ? ? ? ? ? ? ? ? | ?                           



                          ? ? ? ? ? ?| infection                           



                          very | discouraged ? |                           



                          Overruling: ? ? ? ? ? ?                           



                          ? ? | ? ? ? ? ? ? ?|                           



                          unlikely ? ? ? | ? ? ? ?                           



                          ? ? ? | ? Clinically                           



                          unstable ? ? ?                           



                          +--------------+--------                           



                          --------+---------------                           



                          + ? High risk for                           



                          adverse ? ? | <0.25 ? ?                           



                          ? ?| Bacterial ? ? ?|                           



                          Discouraged ? | ?                           



                          outcome ? ? ? ? ? ? ? ?                           



                          ? | ? ? ? ? ? ? ?|                           



                          infection ? ? ?| ? ? ? ?                           



                          ? ? ? | ? SEE IMPORTANT                           



                          NOTE ? ? ? ?| ? ? ? ? ?                           



                          ? ?| unlikely ? ? ? | ?                           



                          ? ? ? ? ? ? | ? ? ? ? ?                           



                          ? ? ? ? ? ? ? ? ?                           



                          +--------------+--------                           



                          --------+---------------                           



                          +-----------------------                           



                          -----+| >=0.25 ? ? ? |                           



                          Bacterial ? ? ?|                           



                          Encouraged ? ?| ? ? ? ?                           



                          ? ? ? ? ? ? ? ? ? ? | ?                           



                          ? ? ? ? ? ?| infection ?                           



                          ? ?| ? ? ? ? ? ? ? | ? ?                           



                          ? ? ? ? ? ? ? ? ? ? ? ?                           



                          | ? ? ? ? ? ? ?| likely                           



                          ? ? ? ? | ? ? ? ? ? ? ?                           



                          | Consider treatment                           



                          failure                                



                          ?+--------------+-------                           



                          ---------+--------------                           



                          -+ if levels does not                           



                          decrease | >0.5 ? ? ? ?                           



                          | Bacterial ? ? ?|                           



                          Strongly ? ? ?|                           



                          appropriately ? ? ? ? ?                           



                          ? ? | ? ? ? ? ? ? ?|                           



                          infection very |                           



                          encouraged ? ?| ? ? ? ?                           



                          ? ? ? ? ? ? ? ? ? ? | ?                           



                          ? ? ? ? ? ?| likely ? ?                           



                          ? ? | ? ? ? ? ? ? ? | ?                           



                          ? ? ? ? ? ? ? ? ? ? ? ?                           



                          ?                                      



                          +--------------+--------                           



                          --------+---------------                           



                          +-----------------------                           



                          -----+ Discontinuation                           



                          of antibiotics in                           



                          high-acuity patients                           



                          with suspected or                           



                          confirmed sepsis in                           



                          Adults >= 18 years of                           



                          age.                                   



                          +--------------+--------                           



                          --------+---------------                           



                          +-----------------------                           



                          -----+|Procalcitonin                           



                          |Interpretation                           



                          ?|Antibiotic ? ?                           



                          |Considerations ? ? ? ?                           



                          ? ? ? |ng/mL ? ? ? ? | ?                           



                          ? ? ? ? ? ?                            



                          ?|recommendation | ? ? ?                           



                          ? ? ? ? ? ? ? ? ? ? ?                           



                          +--------------+--------                           



                          --------+---------------                           



                          +-----------------------                           



                          -----+| <0.25 ? ? ? ?|                           



                          Bacterial ? ? ?|                           



                          Strongly ? ? ?| ? ? ? ?                           



                          ? ? ? ? ? ? ? ? ? ? | ?                           



                          ? ? ? ? ? ?| infection                           



                          very | discouraged ? |                           



                          Overruling: ? ? ? ? ? ?                           



                          ? ? | ? ? ? ? ? ? ?|                           



                          unlikely ? ? ? | ? ? ? ?                           



                          ? ? ? | ? Clinically                           



                          unstable ? ? ?                           



                          +--------------+--------                           



                          --------+---------------                           



                          + ? High risk for                           



                          adverse ? ? | <0.5 or                           



                          drop | Bacterial ? ? ?|                           



                          Discouraged ? | ?                           



                          outcome ? ? ? ? ? ? ? ?                           



                          ? | >80% from ? ?|                           



                          infection ? ? ?| ? ? ? ?                           



                          ? ? ? | ? SEE IMPORTANT                           



                          NOTE ? ? ? ?| highest                           



                          PCT ?| unlikely ? ? ? |                           



                          ? ? ? ? ? ? ? | ? ? ? ?                           



                          ? ? ? ? ? ? ? ? ? ? |                           



                          level ? ? ? ?| ? ? ? ? ?                           



                          ? ? ?| ? ? ? ? ? ? ? | ?                           



                          ? ? ? ? ? ? ? ? ? ? ? ?                           



                          ?                                      



                          +--------------+--------                           



                          --------+---------------                           



                          +-----------------------                           



                          -----+| >=0.5 ? ? ? ?|                           



                          Bacterial ? ? ?|                           



                          Encouraged ? ?| ? ? ? ?                           



                          ? ? ? ? ? ? ? ? ? ? | ?                           



                          ? ? ? ? ? ?| infection ?                           



                          ? ?| ? ? ? ? ? ? ? | ? ?                           



                          ? ? ? ? ? ? ? ? ? ? ? ?                           



                          | ? ? ? ? ? ? ?| likely                           



                          ? ? ? ? | ? ? ? ? ? ? ?                           



                          | Consider treatment                           



                          failure                                



                          ?+--------------+-------                           



                          ---------+--------------                           



                          -+ if levels does not                           



                          decrease | >1.0 ? ? ? ?                           



                          | Bacterial ? ? ?|                           



                          Strongly ? ? ?|                           



                          appropriately ? ? ? ? ?                           



                          ? ? | ? ? ? ? ? ? ?|                           



                          infection very |                           



                          encouraged ? ?| ? ? ? ?                           



                          ? ? ? ? ? ? ? ? ? ? | ?                           



                          ? ? ? ? ? ?| likely ? ?                           



                          ? ? | ? ? ? ? ? ? ? | ?                           



                          ? ? ? ? ? ? ? ? ? ? ? ?                           



                          ?                                      



                          +--------------+--------                           



                          --------+---------------                           



                          +-----------------------                           



                          -----+ Percentage of                           



                          drop of Procalcitonin                           



                          calculation for                           



                          Discontinuation of                           



                          antibiotics in                           



                          high-acuity patients                           



                          with suspected or                           



                          confirmed sepsis in                           



                          Adults >= 18 years of                           



                          age. ? ? ? ? ? ? ? ? ? ?                           



                          ? Procalcitonin                           



                          highest{}-Procalcitonin                           



                          current{}Delta                           



                          Procalcitonin =                           



                          ________________________                           



                          _______________________                           



                          x100% ? ? ? ? ? ? ? ? ?                           



                          ? ? ? ? ? ? ?                           



                          Procalcitonin current {}                           



                          IMPORTANT NOTE:                           



                          Procalcitonin may be                           



                          elevated without                           



                          bacterial infection by                           



                          physiologic stress                           



                          related to trauma,                           



                          burns, chronic dialysis,                           



                          metastatic cancer,                           



                          surgery in the past                           



                          seven days, malaria,                           



                          some fungal infections,                           



                          and some forms of                           



                          vasculitis. The                           



                          interpretation algorithm                           



                          may not apply to                           



                          patients with                           



                          immunosuppression                           



                          (equivalent of >10 mg of                           



                          prednisone daily), HIV                           



                          with CD4 cell count <                           



                          350 cells/mm3, active                           



                          malignancy on systemic                           



                          chemotherapy, solid                           



                          organ transplant or                           



                          hematopoietic stem cell                           



                          transplantation, or                           



                          hospital acquired                           



                          pneumonia. Additionally,                           



                          some clinical trials of                           



                          procalcitonin have                           



                          excluded patients with                           



                          shock requiring                           



                          vasopressor use, acute                           



                          respiratory failure                           



                          requiring mechanical                           



                          ventilation, or those                           



                          with known lung                           



                          abscess/empyema. For                           



                          further information                           



                          please refer                           



                          to:[email or web address]                           



                          [email or web address]                           

 

             Lab Interpretation Normal                                 



             (test code = 76866-0)                                        



[facility] CenterSED[facility]ATION [id number] 13:21:10





             Test Item    Value        Reference Range Interpretation Comments

 

             ESR (test code =              See_Comment  H             [Automated

 message]



             8497898071)                                         The system [employer]



                                                                 generated this 

result



                                                                 transmitted ref

erence



                                                                 range: 0 - 10 m

m/HR.



                                                                 The reference r

zhen



                                                                 was not used to



                                                                 interpret this 

result



                                                                 as normal/abnor

mal.

 

             Lab Interpretation (test Abnormal                               



             code = 23873-7)                                        



[facility] 
[facility]:12:27





             Test Item    Value        Reference Range Interpretation Comments

 

             HGB A1C (test code = 6.3 %        4.0-5.7      H            



             4548-4)                                             

 

             MAL (test code = MAL) Reference                              



                          RangesNormal:                           



                          <5.7%Prediabetes:                           



                          5.7 - 6.4%Diabetes:                           



                          > 6.5%                                 

 

             Lab Interpretation (test Abnormal                               



             code = 85339-5)                                        



[id number]14





             Test Item    Value        Reference Range Interpretation Comments

 

             NT-proBNP (test code 16 pg/mL     See_Comment                [Autom

ated



             = [id number])                                        message] The



                                                                 system which



                                                                 generated this



                                                                 result



                                                                 transmitted



                                                                 reference range

:



                                                                 <=125. The



                                                                 reference range



                                                                 was not used to



                                                                 interpret this



                                                                 result as



                                                                 normal/abnormal

.

 

             MAL (test code = MAL) Biotin has been                           



                          reported to                            



                          cause a negative                           



                          bias, interpret                           



                          results relative                           



                          to patient's use                           



                          of biotin.                             

 

             Lab Interpretation Normal                                 



             (test code = 84910-9)                                        



[facility]. METABOLIC PANEL (83045)2022 
11:44:55





             Test Item    Value        Reference Range Interpretation Comments

 

             NA (test code = 142 mmol/L   135-145                   



             7368518244)                                         

 

             K (test code = 3.9 mmol/L   3.5-5.0                   



             2420036741)                                         

 

             CL (test code = 108 mmol/L                       



             2376220132)                                         

 

             CO2 TOTAL (test code = 24 mmol/L    23-31                     



             2487461050)                                         

 

             AGAP (test code =              2-16                      



             4985566655)                                         

 

             BUN (test code = 11 mg/dL     7-23                      



             8525541202)                                         

 

             GLUCOSE (test code = 205 mg/dL           H            



             4075045520)                                         

 

             CREATININE (test code = 0.71 mg/dL   0.60-1.25                 



             2982347517)                                         

 

             TOTAL BILI (test code = 0.7 mg/dL    0.1-1.1                   



             2534483445)                                         

 

             CALCIUM (test code = 8.9 mg/dL    8.6-10.6                  



             4067061366)                                         

 

             T PROTEIN (test code = 7.3 g/dL     6.3-8.2                   



             4482536229)                                         

 

             ALBUMIN (test code = 3.8 g/dL     3.5-5.0                   



             6099741564)                                         

 

             ALK PHOS (test code = 135 U/L             H            



             6844532220)                                         

 

             ALTv (test code = 120 U/L      5-50         H            



             1742-6)                                             

 

             AST(SGOT) (test code = 33 U/L       13-40                     



             8606926599)                                         

 

             eGFR (test code =              mL/min/1.73m2              



             0339386391)                                         

 

             MAL (test code = MAL) Association of                           



                          Glomerular Filtration                           



                          Rate (GFR) and Staging                           



                          of Kidney Disease*                           



                          +---------------------                           



                          --+-------------------                           



                          --+-------------------                           



                          ------+| GFR                           



                          (mL/min/1.73 m2) ?|                           



                          With Kidney Damage ?|                           



                          ?Without Kidney                           



                          Damage+---------------                           



                          --------+-------------                           



                          --------+-------------                           



                          ------------+| ?>90 ?                           



                          ? ? ? ? ? ? ? ?|                           



                          ?Stage one ? ? ? ? ?|                           



                          ? Normal ? ? ? ? ? ? ?                           



                          ?+--------------------                           



                          ---+------------------                           



                          ---+------------------                           



                          -------+| ?60-89 ? ? ?                           



                          ? ? ? ? ?| ?Stage two                           



                          ? ? ? ? ?| ? Decreased                           



                          GFR ? ? ? ?                            



                          +---------------------                           



                          --+-------------------                           



                          --+-------------------                           



                          ------+| ?30-59 ? ? ?                           



                          ? ? ? ? ?| ?Stage                           



                          three ? ? ? ?| ? Stage                           



                          three ? ? ? ? ?                           



                          +---------------------                           



                          --+-------------------                           



                          --+-------------------                           



                          ------+| ?15-29 ? ? ?                           



                          ? ? ? ? ?| ?Stage four                           



                          ? ? ? ? | ? Stage four                           



                          ? ? ? ? ?                              



                          ?+--------------------                           



                          ---+------------------                           



                          ---+------------------                           



                          -------+| ?<15 (or                           



                          dialysis) ? ?| ?Stage                           



                          five ? ? ? ? | ? Stage                           



                          five ? ? ? ? ?                           



                          ?+--------------------                           



                          ---+------------------                           



                          ---+------------------                           



                          -------+ *Each stage                           



                          assumes the associated                           



                          GFR level has been in                           



                          effect for at least                           



                          three months. ?Stages                           



                          1 to 5, with or                           



                          without kidney                           



                          disease, indicate                           



                          chronic kidney                           



                          disease. Notes:                           



                          Determination of                           



                          stages one and two                           



                          (with eGFR                             



                          >59mL/min/1.73 m2)                           



                          requires estimation of                           



                          kidney damage for at                           



                          least three months as                           



                          defined by structural                           



                          or functional                           



                          abnormalities of the                           



                          kidney, manifested by                           



                          either:Pathological                           



                          abnormalities or                           



                          Markers of kidney                           



                          damage (including                           



                          abnormalities in the                           



                          composition of the                           



                          blood or urine or                           



                          abnormalities in                           



                          imaging tests).                           

 

             Lab Interpretation Abnormal                               



             (test code = 74097-2)                                        



[facility]





             Test Item    Value        Reference Range Interpretation Comments

 

             MAGNESIUM (test code = 0185435357) 1.6 mg/dL    1.7-2.4      L     

       

 

             Lab Interpretation (test code = Abnormal                           

    



             86089-8)                                            



[facility]35





             Test Item    Value        Reference Range Interpretation Comments

 

             PHOSPHORUS (test code = 7350722329) 4.1 mg/dL    2.5-5.0           

        

 

             Lab Interpretation (test code = Normal                             

    



             23582-6)                                            



[id number] 11[facility]44:15





             Test Item    Value        Reference Range Interpretation Comments

 

             CK (test code = 6345940182) 50 U/L                           

 

             Lab Interpretation (test code = Normal                             

    



             59894-8)                                            



[facility] [id number] 11:44:15





             Test Item    Value        Reference Range Interpretation Comments

 

             CK (test code = 3509425549) 50 U/L                           

 

             Lab Interpretation (test code = Normal                             

    



             01019-6)                                            



[facility] [id number] 11:28:14





             Test Item    Value        Reference Range Interpretation Comments

 

             WBC (test code =              See_Comment                [Automated



             6090-2)                                             message] The sy

stem



                                                                 which generated



                                                                 this result



                                                                 transmitted



                                                                 reference range

:



                                                                 4.20 - 10.70



                                                                 10*3/?L. The



                                                                 reference range

 was



                                                                 not used to



                                                                 interpret this



                                                                 result as



                                                                 normal/abnormal

.

 

             RBC (test code =              See_Comment                [Automated



             789-8)                                              message] The sy

stem



                                                                 which generated



                                                                 this result



                                                                 transmitted



                                                                 reference range

:



                                                                 4.26 - 5.52



                                                                 10*6/?L. The



                                                                 reference range

 was



                                                                 not used to



                                                                 interpret this



                                                                 result as



                                                                 normal/abnormal

.

 

             HGB (test code = 15.0 g/dL    12.2-16.4                 



             718-7)                                              

 

             HCT (test code = 44.6 %       38.4-49.3                 



             4544-3)                                             

 

             MCV (test code = 87.6 fL      81.7-95.6                 



             787-2)                                              

 

             MCH (test code = 29.5 pg      26.1-32.7                 



             785-6)                                              

 

             MCHC (test code = 33.6 g/dL    31.2-35.0                 



             786-4)                                              

 

             RDW-SD (test code = 48.4 fL      38.5-51.6                 



             72383-5)                                            

 

             RDW-CV (test code = 15.1 %       12.1-15.4                 



             788-0)                                              

 

             PLT (test code =              See_Comment                [Automated



             777-3)                                              message] The sy

stem



                                                                 which generated



                                                                 this result



                                                                 transmitted



                                                                 reference range

:



                                                                 150 - 328 10*3/

?L.



                                                                 The reference r

zhen



                                                                 was not used to



                                                                 interpret this



                                                                 result as



                                                                 normal/abnormal

.

 

             MPV (test code = 10.2 fL      9.8-13.0                  



             04675-2)                                            

 

             NRBC/100 WBC (test              See_Comment                [Automat

ed



             code = 1027443084)                                        message] 

The system



                                                                 which generated



                                                                 this result



                                                                 transmitted



                                                                 reference range

:



                                                                 0.0 - 10.0 /100



                                                                 WBCs. The refer

ence



                                                                 range was not u

sed



                                                                 to interpret th

is



                                                                 result as



                                                                 normal/abnormal

.

 

             NRBC x10^3 (test code <0.01        See_Comment                [Auto

mated



             = 1296242853)                                        message] The s

ystem



                                                                 which generated



                                                                 this result



                                                                 transmitted



                                                                 reference range

:



                                                                 10*3/?L. The



                                                                 reference range

 was



                                                                 not used to



                                                                 interpret this



                                                                 result as



                                                                 normal/abnormal

.

 

             GRAN MAT (NEUT) % 67.8 %                                 



             (test code = 770-8)                                        

 

             IMM GRAN % (test code 0.80 %                                 



             = 7205785692)                                        

 

             LYMPH % (test code = 19.8 %                                 



             736-9)                                              

 

             MONO % (test code = 8.7 %                                  



             5905-5)                                             

 

             EOS % (test code = 2.7 %                                  



             713-8)                                              

 

             BASO % (test code = 0.2 %                                  



             706-2)                                              

 

             GRAN MAT x10^3(ANC) 6.56 10*3/uL 1.99-6.95                 



             (test code =                                        



             1519799471)                                         

 

             IMM GRAN x10^3 (test 0.08 10*3/uL 0.00-0.06    H            



             code = 7664028057)                                        

 

             LYMPH x10^3 (test code 1.91 10*3/uL 1.09-3.23                 



             = 731-0)                                            

 

             MONO x10^3 (test code 0.84 10*3/uL 0.36-1.02                 



             = 742-7)                                            

 

             EOS x10^3 (test code = 0.26 10*3/uL 0.06-0.53                 



             711-2)                                              

 

             BASO x10^3 (test code <0.03        0.01-0.09                 



             = 704-7)                                            

 

             Lab Interpretation Abnormal                               



             (test code = 69998-1)                                        



[facility] [id number] 10:56:10





             Test Item    Value        Reference Range Interpretation Comments

 

             IRON (test code = 9424373336) 46 ug/dL            L          

  

 

             TIBC (test code = 3366679998) 299 ug/dL    250-410                 

  

 

             % FE SAT (test code = 6748504600) 15 %         20-50        L      

      

 

             Lab Interpretation (test code = Abnormal                           

    



             82414-7)                                            



[facility] [id number] 10:56:10





             Test Item    Value        Reference Range Interpretation Comments

 

             IRON (test code = 5345910333) 46 ug/dL            L          

  

 

             TIBC (test code = 4846465148) 299 ug/dL    250-410                 

  

 

             % FE SAT (test code = 1118235339) 15 %         20-50        L      

      

 

             Lab Interpretation (test code = Abnormal                           

    



             39618-2)                                            



[id number]03





             Test Item    Value        Reference Range Interpretation Comments

 

             FERRITIN (test code = 89.2 ng/mL                       



             7786246384)                                         

 

             MAL (test code = MAL) Biotin has been                           



                          reported to cause a                           



                          negative bias,                           



                          interpret results                           



                          relative to                            



                          patient's use of                           



                          biotin.                                

 

             Lab Interpretation (test Normal                                 



             code = 85561-6)                                        



[facility] [id number] 10:13:03





             Test Item    Value        Reference Range Interpretation Comments

 

             FERRITIN (test code = 89.2 ng/mL   18.0-464.0                



             [id number])                                         

 

             MAL (test code = MAL) Biotin has been                           



                          reported to cause a                           



                          negative bias,                           



                          interpret results                           



                          relative to                            



                          patient's use of                           



                          biotin.                                

 

             Lab Interpretation (test Normal                                 



             code = 23355-6)                                        



[facility] [id number] 10:09:06





             Test Item    Value        Reference Range Interpretation Comments

 

             TSH (test code =              See_Comment                [Automated

 message]



             [id number])                                         The system [employer]



                                                                 generated this 

result



                                                                 transmitted ref

erence



                                                                 range: 0.45 - 4

.70



                                                                 mIU/L. The refe

rence



                                                                 range was not u

sed to



                                                                 interpret this 

result



                                                                 as normal/abnor

mal.

 

             Lab Interpretation (test Normal                                 



             code = 55671-1)                                        



[facility] [id number] 10:09:06





             Test Item    Value        Reference Range Interpretation Comments

 

             TSH (test code =              See_Comment                [Automated

 message]



             9890295027)                                         The system [employer]



                                                                 generated this 

result



                                                                 transmitted ref

erence



                                                                 range: 0.45 - 4

.70



                                                                 mIU/L. The refe

rence



                                                                 range was not u

sed to



                                                                 interpret this 

result



                                                                 as normal/abnor

mal.

 

             Lab Interpretation (test Normal                                 



             code = 04316-0)                                        



[id number] 09:47:44





             Test Item    Value        Reference Range Interpretation Comments

 

             NT-proBNP (test code 12 pg/mL     See_Comment                [Autom

ated



             = [id number])                                        message] The



                                                                 system which



                                                                 generated this



                                                                 result



                                                                 transmitted



                                                                 reference range

:



                                                                 <=125. The



                                                                 reference range



                                                                 was not used to



                                                                 interpret this



                                                                 result as



                                                                 normal/abnormal

.

 

             MAL (test code = MAL) Biotin has been                           



                          reported to                            



                          cause a negative                           



                          bias, interpret                           



                          results relative                           



                          to patient's use                           



                          of biotin.                             

 

             Lab Interpretation Normal                                 



             (test code = 16949-3)                                        



[facility][id number][facility]TOTAL CHOLESTEROL, 
TRIGLYCERIDES, HDL)2022 09:35:58





             Test Item    Value        Reference Range Interpretation Comments

 

             CHOL (test code = 250 mg/dL    120-200      H            



             5842120686)                                         

 

             HDL (test code = 34 mg/dL     See_Comment  L             [Automated

 message]



             5245958522)                                         The system [employer]



                                                                 generated this



                                                                 result transmit

huma



                                                                 reference range

:



                                                                 >=40. The refer

ence



                                                                 range was not u

sed



                                                                 to interpret th

is



                                                                 result as



                                                                 normal/abnormal

.

 

             HDLC RATIO (test code =              See_Comment  H             [Au

tomated message]



             0513462789)                                         The system [employer]



                                                                 generated this



                                                                 result transmit

huma



                                                                 reference range

:



                                                                 <=5.0. The refe

rence



                                                                 range was not u

sed



                                                                 to interpret th

is



                                                                 result as



                                                                 normal/abnormal

.

 

             TRIG (test code = 394 mg/dL           H            



             8483358367)                                         

 

             LDL CHOL (test code = 137 mg/dL    See_Comment                [Auto

mated message]



             79670-1)                                            The system [employer]



                                                                 generated this



                                                                 result transmit

huma



                                                                 reference range

:



                                                                 <=160. The refe

rence



                                                                 range was not u

sed



                                                                 to interpret th

is



                                                                 result as



                                                                 normal/abnormal

.

 

             VLDL (test code = 79 mg/dL     5-60         H            



             4676088451)                                         

 

             Lab Interpretation (test Abnormal                               



             code = 44527-4)                                        



[facility][id number]TOTAL CHOLESTEROL, 
TRIGLYCERIDES, HDL)2022 09:35:58





             Test Item    Value        Reference Range Interpretation Comments

 

             CHOL (test code = 250 mg/dL    120-200      H            



             9210159605)                                         

 

             HDL (test code = 34 mg/dL     >40          L            



             2230141861)                                         

 

             HDLC RATIO (test code =              See_Comment  H             [Au

tomated message]



             7758059717)                                         The system [employer]



                                                                 generated this



                                                                 result transmit

huma



                                                                 reference range

:



                                                                 <=5.0. The refe

rence



                                                                 range was not u

sed



                                                                 to interpret th

is



                                                                 result as



                                                                 normal/abnormal

.

 

             TRIG (test code = 394 mg/dL           H            



             6718788107)                                         

 

             LDL CHOL (test code = 137 mg/dL    See_Comment                [Auto

mated message]



             52030-7)                                            The system [employer]



                                                                 generated this



                                                                 result transmit

huma



                                                                 reference range

:



                                                                 <=160. The refe

rence



                                                                 range was not u

sed



                                                                 to interpret th

is



                                                                 result as



                                                                 normal/abnormal

.

 

             VLDL (test code = 79 mg/dL     5-60         H            



             4245717441)                                         

 

             Lab Interpretation (test Abnormal                               



             code = 51499-7)                                        



[facility]42





             Test Item    Value        Reference Range Interpretation Comments

 

             MAGNESIUM (test code = 2652201581) 1.5 mg/dL    1.7-2.4      L     

       

 

             Lab Interpretation (test code = Abnormal                           

    



             23746-4)                                            



[facility]42





             Test Item    Value        Reference Range Interpretation Comments

 

             PHOSPHORUS (test code = 1945442393) 3.3 mg/dL    2.5-5.0           

        

 

             Lab Interpretation (test code = Normal                             

    



             13636-9)                                            



[facility][id number][facility]2022 
01:29:09





             Test Item    Value        Reference Range Interpretation Comments

 

             NA (test code = 142 mmol/L   135-145                   



             5920315475)                                         

 

             K (test code = 3.7 mmol/L   3.5-5.0                   



             2245470666)                                         

 

             CL (test code = 103 mmol/L                       



             6058768965)                                         

 

             CO2 TOTAL (test code = 27 mmol/L    23-31                     



             1224796960)                                         

 

             AGAP (test code =              2-16                      



             6810522293)                                         

 

             BUN (test code = 11 mg/dL     7-23                      



             0127429103)                                         

 

             GLUCOSE (test code = 166 mg/dL           H            



             3075043510)                                         

 

             CREATININE (test code = 0.61 mg/dL   0.60-1.25                 



             4760766949)                                         

 

             TOTAL BILI (test code = 0.8 mg/dL    0.1-1.1                   



             8936262009)                                         

 

             CALCIUM (test code = 9.3 mg/dL    8.6-10.6                  



             8176378937)                                         

 

             T PROTEIN (test code = 7.4 g/dL     6.3-8.2                   



             7966173751)                                         

 

             ALBUMIN (test code = 4.0 g/dL     3.5-5.0                   



             3006584020)                                         

 

             ALK PHOS (test code = 164 U/L             H            



             6537508146)                                         

 

             ALTv (test code = 149 U/L      5-50         H            



             1742-6)                                             

 

             AST(SGOT) (test code = 29 U/L       13-40                     



             4766269323)                                         

 

             eGFR (test code =              mL/min/1.73m2              



             0939962374)                                         

 

             MAL (test code = MAL) Association of                           



                          Glomerular Filtration                           



                          Rate (GFR) and Staging                           



                          of Kidney Disease*                           



                          +---------------------                           



                          --+-------------------                           



                          --+-------------------                           



                          ------+| GFR                           



                          (mL/min/1.73 m2) ?|                           



                          With Kidney Damage ?|                           



                          ?Without Kidney                           



                          Damage+---------------                           



                          --------+-------------                           



                          --------+-------------                           



                          ------------+| ?>90 ?                           



                          ? ? ? ? ? ? ? ?|                           



                          ?Stage one ? ? ? ? ?|                           



                          ? Normal ? ? ? ? ? ? ?                           



                          ?+--------------------                           



                          ---+------------------                           



                          ---+------------------                           



                          -------+| ?60-89 ? ? ?                           



                          ? ? ? ? ?| ?Stage two                           



                          ? ? ? ? ?| ? Decreased                           



                          GFR ? ? ? ?                            



                          +---------------------                           



                          --+-------------------                           



                          --+-------------------                           



                          ------+| ?30-59 ? ? ?                           



                          ? ? ? ? ?| ?Stage                           



                          three ? ? ? ?| ? Stage                           



                          three ? ? ? ? ?                           



                          +---------------------                           



                          --+-------------------                           



                          --+-------------------                           



                          ------+| ?15-29 ? ? ?                           



                          ? ? ? ? ?| ?Stage four                           



                          ? ? ? ? | ? Stage four                           



                          ? ? ? ? ?                              



                          ?+--------------------                           



                          ---+------------------                           



                          ---+------------------                           



                          -------+| ?<15 (or                           



                          dialysis) ? ?| ?Stage                           



                          five ? ? ? ? | ? Stage                           



                          five ? ? ? ? ?                           



                          ?+--------------------                           



                          ---+------------------                           



                          ---+------------------                           



                          -------+ *Each stage                           



                          assumes the associated                           



                          GFR level has been in                           



                          effect for at least                           



                          three months. ?Stages                           



                          1 to 5, with or                           



                          without kidney                           



                          disease, indicate                           



                          chronic kidney                           



                          disease. Notes:                           



                          Determination of                           



                          stages one and two                           



                          (with eGFR                             



                          >59mL/min/1.73 m2)                           



                          requires estimation of                           



                          kidney damage for at                           



                          least three months as                           



                          defined by structural                           



                          or functional                           



                          abnormalities of the                           



                          kidney, manifested by                           



                          either:Pathological                           



                          abnormalities or                           



                          Markers of kidney                           



                          damage (including                           



                          abnormalities in the                           



                          composition of the                           



                          blood or urine or                           



                          abnormalities in                           



                          imaging tests).                           

 

             Lab Interpretation Abnormal                               



             (test code = 80885-6)                                        



[facility] [id number] 
01:23:46





             Test Item    Value        Reference Range Interpretation Comments

 

             APTT Patient (test              See_Comment                [Automat

ed



             code = 3173-2)                                        message] The



                                                                 system which



                                                                 generated this



                                                                 result



                                                                 transmitted



                                                                 reference range

:



                                                                 23 - 38 Seconds

.



                                                                 The reference



                                                                 range was not



                                                                 used to interpr

et



                                                                 this result as



                                                                 normal/abnormal

.

 

             MAL (test code = MAL) The [facility] patient                           



                          population mean                           



                          normal value for                           



                          aPTT is 30                             



                          seconds.                               

 

             Lab Interpretation Normal                                 



             (test code = 17182-3)                                        



[facility]PROT[facility] / [id number] 01:21:51





             Test Item    Value        Reference Range Interpretation Comments

 

             PROTIME PATIENT (test              See_Comment                [Auto

mated message]



             code = 5964-2)                                        The system wh

ich



                                                                 generated this 

result



                                                                 transmitted ref

erence



                                                                 range: 12.0 - 1

4.7



                                                                 Seconds. The re

ference



                                                                 range was not u

sed to



                                                                 interpret this 

result



                                                                 as normal/abnor

mal.

 

             INR (test code = 6301-6)                                        Nor

mal INR <1.1;



                                                                 Warfarin Therap

eutic



                                                                 range 2.0 to 3.

0 or



                                                                 2.5 to 3.5, dep

ending



                                                                 upon the indica

tions.

 

             Lab Interpretation (test Normal                                 



             code = 42183-0)                                        



[facility] WITH [id number] 01:15:28





             Test Item    Value        Reference Range Interpretation Comments

 

             WBC (test code =              See_Comment  H             [Automated



             6690-2)                                             message] The sy

stem



                                                                 which generated



                                                                 this result



                                                                 transmitted



                                                                 reference range

:



                                                                 4.20 - 10.70



                                                                 10*3/?L. The



                                                                 reference range

 was



                                                                 not used to



                                                                 interpret this



                                                                 result as



                                                                 normal/abnormal

.

 

             RBC (test code =              See_Comment  H             [Automated



             789-8)                                              message] The sy

stem



                                                                 which generated



                                                                 this result



                                                                 transmitted



                                                                 reference range

:



                                                                 4.26 - 5.52



                                                                 10*6/?L. The



                                                                 reference range

 was



                                                                 not used to



                                                                 interpret this



                                                                 result as



                                                                 normal/abnormal

.

 

             HGB (test code = 16.5 g/dL    12.2-16.4    H            



             718-7)                                              

 

             HCT (test code = 49.1 %       38.4-49.3                 



             4544-3)                                             

 

             MCV (test code = 87.7 fL      81.7-95.6                 



             787-2)                                              

 

             MCH (test code = 29.5 pg      26.1-32.7                 



             785-6)                                              

 

             MCHC (test code = 33.6 g/dL    31.2-35.0                 



             786-4)                                              

 

             RDW-SD (test code = 48.4 fL      38.5-51.6                 



             66241-5)                                            

 

             RDW-CV (test code = 15.1 %       12.1-15.4                 



             788-0)                                              

 

             PLT (test code =              See_Comment                [Automated



             777-3)                                              message] The sy

stem



                                                                 which generated



                                                                 this result



                                                                 transmitted



                                                                 reference range

:



                                                                 150 - 328 10*3/

?L.



                                                                 The reference r

zhen



                                                                 was not used to



                                                                 interpret this



                                                                 result as



                                                                 normal/abnormal

.

 

             MPV (test code = 10.1 fL      9.8-13.0                  



             43744-9)                                            

 

             NRBC/100 WBC (test              See_Comment                [Automat

ed



             code = [id number])                                        message] 

The system



                                                                 which generated



                                                                 this result



                                                                 transmitted



                                                                 reference range

:



                                                                 0.0 - 10.0 /100



                                                                 WBCs. The refer

ence



                                                                 range was not u

sed



                                                                 to interpret th

is



                                                                 result as



                                                                 normal/abnormal

.

 

             NRBC x10^3 (test code <0.01        See_Comment                [Auto

mated



             = [id number])                                        message] The s

ystem



                                                                 which generated



                                                                 this result



                                                                 transmitted



                                                                 reference range

:



                                                                 10*3/?L. The



                                                                 reference range

 was



                                                                 not used to



                                                                 interpret this



                                                                 result as



                                                                 normal/abnormal

.

 

             GRAN MAT (NEUT) % 72.7 %                                 



             (test code = 770-8)                                        

 

             IMM GRAN % (test code 0.60 %                                 



             = [id number])                                        

 

             LYMPH % (test code = 15.5 %                                 



             736-9)                                              

 

             MONO % (test code = 8.2 %                                  



             5905-5)                                             

 

             EOS % (test code = 2.6 %                                  



             713-8)                                              

 

             BASO % (test code = 0.4 %                                  



             706-2)                                              

 

             GRAN MAT x10^3(ANC) 7.83 10*3/uL 1.99-6.95    H            



             (test code =                                        



             [id number])                                         

 

             IMM GRAN x10^3 (test 0.07 10*3/uL 0.00-0.06    H            



             code = [id number])                                        

 

             LYMPH x10^3 (test code 1.67 10*3/uL 1.09-3.23                 



             = 731-0)                                            

 

             MONO x10^3 (test code 0.88 10*3/uL 0.36-1.02                 



             = 742-7)                                            

 

             EOS x10^3 (test code = 0.28 10*3/uL 0.06-0.53                 



             711-2)                                              

 

             BASO x10^3 (test code 0.04 10*3/uL 0.01-0.09                 



             = 704-7)                                            

 

             Lab Interpretation Abnormal                               



             (test code = 52966-9)                                        



[id number]





             Test Item    Value        Reference Range Interpretation Comments

 

             LACTIC ACID (test code = 1.86 mmol/L  0.50-2.20                 



             [id number])                                         

 

             Lab Interpretation (test code = Normal                             

    



             03842-7)                                            



[id number]58[facility]00





             Test Item    Value        Reference Range Interpretation Comments

 

             Glucose Lvl (test code = Glucose Lvl) 228          70-99           

          



[facility]2022-04-12 12:58:00





             Test Item    Value        Reference Range Interpretation Comments

 

             BUN (test code = BUN) 23           7-22                      



[facility]2022-04-12 12:58:00





             Test Item    Value        Reference Range Interpretation Comments

 

             Creatinine Lvl (test code = Creatinine 0.78         0.50-1.40      

           



             Lvl)                                                



[facility]2022-04-12 12:58:00





             Test Item    Value        Reference Range Interpretation Comments

 

             Sodium Lvl (test code = Sodium Lvl) 138          135-145           

        



[place] 924252-04-12 12:58:00





             Test Item    Value        Reference Range Interpretation Comments

 

             Potassium Lvl (test code = Potassium 4.6          3.5-5.1          

         



             Lvl)                                                



[place] 924252-04-12 12:58:00





             Test Item    Value        Reference Range Interpretation Comments

 

             Chloride Lvl (test code = Chloride Lvl) 108                  

            



[place] 924252-04-12 12:58:00





             Test Item    Value        Reference Range Interpretation Comments

 

             CO2 (test code = CO2) 23           24-32                     



[place] 924252-04-12 12:58:00





             Test Item    Value        Reference Range Interpretation Comments

 

             Calcium Lvl (test code = Calcium Lvl) 9.0          8.5-10.5        

          



[place] 924252-04-12 12:58:00





             Test Item    Value        Reference Range Interpretation Comments

 

             AGAP (test code = AGAP) 11.6         10.0-20.0                 



[place] 924252-04-12 12:58:00





             Test Item    Value        Reference Range Interpretation Comments

 

             eGFR (test code = eGFR) 116                                    



[place] 732852-04-12 12:58:00





             Test Item    Value        Reference Range Interpretation Comments

 

             WBC (test code = WBC) 10.5         3.7-10.4                  



[place] 732852-04-12 12:58:00





             Test Item    Value        Reference Range Interpretation Comments

 

             RBC (test code = RBC) 5.48         4.70-6.10                 



[place] 732852-04-12 12:58:00





             Test Item    Value        Reference Range Interpretation Comments

 

             Hgb (test code = Hgb) 16.0         14.0-18.0                 



[place] [address]-04-12 12:58:00





             Test Item    Value        Reference Range Interpretation Comments

 

             Hct (test code = Hct) 49.8         42.0-54.0                 



[place] [address]-04-12 12:58:00





             Test Item    Value        Reference Range Interpretation Comments

 

             MCV (test code = MCV) 90.8         80.0-94.0                 



[place] [address]-04-12 12:58:00





             Test Item    Value        Reference Range Interpretation Comments

 

             MCH (test code = MCH) 29.1 pg      27.0-31.0                 



[place] 732852-04-12 12:58:00





             Test Item    Value        Reference Range Interpretation Comments

 

             MCHC (test code = MCHC) 32.1         32.0-36.0                 



[place] [address]-04-12 12:58:00





             Test Item    Value        Reference Range Interpretation Comments

 

             RDW (test code = RDW) 15.5         11.5-14.5                 



[place] [address]-04-12 12:58:00





             Test Item    Value        Reference Range Interpretation Comments

 

             Platelet (test code = Platelet) 312          133-450               

    



[place] 732852-04-12 12:58:00





             Test Item    Value        Reference Range Interpretation Comments

 

             MPV (test code = MPV) 8.3          7.4-10.4                  



[place] [address]-04-12 12:58:00





             Test Item    Value        Reference Range Interpretation Comments

 

             PT (test code = PT) 12.9 s       12.0-14.7                 



[place] [address]-04-12 12:58:00





             Test Item    Value        Reference Range Interpretation Comments

 

             INR (test code = INR) 0.98 1       0.85-1.17                 



[place] [address]-04-12 12:58:00





             Test Item    Value        Reference Range Interpretation Comments

 

             PTT (test code = PTT) 27.3 s       22.9-35.8                 



[place] [address]-04-12 12:58:00





             Test Item    Value        Reference Range Interpretation Comments

 

             Segs (test code = Segs) 71.2         45.0-75.0                 



[place] [address]-04-12 12:58:00





             Test Item    Value        Reference Range Interpretation Comments

 

             Lymphocytes (test code = Lymphocytes) 21.1         20.0-40.0       

          



[place] [address]-04-12 12:58:00





             Test Item    Value        Reference Range Interpretation Comments

 

             Monocytes (test code = Monocytes) 6.8          2.0-12.0            

      



[place] [address]-04-12 12:58:00





             Test Item    Value        Reference Range Interpretation Comments

 

             Eosinophils (test code = 0.1          See_Comment                [A

utomated message] The



             Eosinophils)                                        system which ge

nerated



                                                                 this result tra

nsmitted



                                                                 reference range

: <=4.0.



                                                                 The reference r

zhen was



                                                                 not used to int

erpret



                                                                 this result as



                                                                 normal/abnormal

.



[place] 732852-04-12 12:58:00





             Test Item    Value        Reference Range Interpretation Comments

 

             Basophils (test code = 0.8          See_Comment                [Aut

omated message] The



             Basophils)                                          system which ge

nerated



                                                                 this result tra

nsmitted



                                                                 reference range

: <=1.0.



                                                                 The reference r

zhen was



                                                                 not used to int

erpret



                                                                 this result as



                                                                 normal/abnormal

.



[place] 732852-04-12 12:58:00





             Test Item    Value        Reference Range Interpretation Comments

 

             Neutrophils # (test code = Neutrophils 7.5          1.5-8.1        

           



             #)                                                  



[place] 732852-04-12 12:58:00





             Test Item    Value        Reference Range Interpretation Comments

 

             Lymphocytes # (test code = Lymphocytes 2.2          1.0-5.5        

           



             #)                                                  



[place] 732852-04-12 12:58:00





             Test Item    Value        Reference Range Interpretation Comments

 

             Monocytes # (test code 0.7          See_Comment                [Aut

omated message] The



             = Monocytes #)                                        system which 

generated



                                                                 this result tra

nsmitted



                                                                 reference range

: <=0.8.



                                                                 The reference r

zhen was



                                                                 not used to int

erpret



                                                                 this result as



                                                                 normal/abnormal

.



[place] 732852-04-12 12:58:00





             Test Item    Value        Reference Range Interpretation Comments

 

             Basophils # (test code 0.1          See_Comment                [Aut

omated message] The



             = Basophils #)                                        system which 

generated



                                                                 this result tra

nsmitted



                                                                 reference range

: <=0.2.



                                                                 The reference r

zhen was



                                                                 not used to int

erpret



                                                                 this result as



                                                                 normal/abnormal

.



[place] 924252-04-12 12:58:00





             Test Item    Value        Reference Range Interpretation Comments

 

             Glucose Lvl (test code = Glucose Lvl) 228          70-99           

          



[place] 924252-04-12 12:58:00





             Test Item    Value        Reference Range Interpretation Comments

 

             BUN (test code = BUN) 23           7-22                      



[place] [address]-04-12 12:58:00





             Test Item    Value        Reference Range Interpretation Comments

 

             Creatinine Lvl (test code = Creatinine 0.78         0.50-1.40      

           



             Lvl)                                                



[place] 924252-04-12 12:58:00





             Test Item    Value        Reference Range Interpretation Comments

 

             Sodium Lvl (test code = Sodium Lvl) 138          135-145           

        



[place] 924252-04-12 12:58:00





             Test Item    Value        Reference Range Interpretation Comments

 

             Potassium Lvl (test code = Potassium 4.6          3.5-5.1          

         



             Lvl)                                                



[place] 924252-04-12 12:58:00





             Test Item    Value        Reference Range Interpretation Comments

 

             Chloride Lvl (test code = Chloride Lvl) 108                  

            



[place] 924252-04-12 12:58:00





             Test Item    Value        Reference Range Interpretation Comments

 

             CO2 (test code = CO2) 23           24-32                     



[place] 924252-04-12 12:58:00





             Test Item    Value        Reference Range Interpretation Comments

 

             Calcium Lvl (test code = Calcium Lvl) 9.0          8.5-10.5        

          



[place] 924252-04-12 12:58:00





             Test Item    Value        Reference Range Interpretation Comments

 

             AGAP (test code = AGAP) 11.6         10.0-20.0                 



[place] 924252-04-12 12:58:00





             Test Item    Value        Reference Range Interpretation Comments

 

             eGFR (test code = eGFR) 116                                    



[place] 732852-04-12 12:58:00





             Test Item    Value        Reference Range Interpretation Comments

 

             WBC (test code = WBC) 10.5         3.7-10.4                  



[place] 732852-04-12 12:58:00





             Test Item    Value        Reference Range Interpretation Comments

 

             RBC (test code = RBC) 5.48         4.70-6.10                 



[place] [address]-04-12 12:58:00





             Test Item    Value        Reference Range Interpretation Comments

 

             Hgb (test code = Hgb) 16.0         14.0-18.0                 



[place] [address]-04-12 12:58:00





             Test Item    Value        Reference Range Interpretation Comments

 

             Hct (test code = Hct) 49.8         42.0-54.0                 



[place] [address]-04-12 12:58:00





             Test Item    Value        Reference Range Interpretation Comments

 

             MCV (test code = MCV) 90.8         80.0-94.0                 



[place] [address]-04-12 12:58:00





             Test Item    Value        Reference Range Interpretation Comments

 

             MCH (test code = MCH) 29.1 pg      27.0-31.0                 



[place] 732852-04-12 12:58:00





             Test Item    Value        Reference Range Interpretation Comments

 

             MCHC (test code = MCHC) 32.1         32.0-36.0                 



[place] [address]-04-12 12:58:00





             Test Item    Value        Reference Range Interpretation Comments

 

             RDW (test code = RDW) 15.5         11.5-14.5                 



[place] [address]-04-12 12:58:00





             Test Item    Value        Reference Range Interpretation Comments

 

             Platelet (test code = Platelet) 312          133-450               

    



[place] 732852-04-12 12:58:00





             Test Item    Value        Reference Range Interpretation Comments

 

             MPV (test code = MPV) 8.3          7.4-10.4                  



[place] [address]-04-12 12:58:00





             Test Item    Value        Reference Range Interpretation Comments

 

             PT (test code = PT) 12.9 s       12.0-14.7                 



[place] [address]-04-12 12:58:00





             Test Item    Value        Reference Range Interpretation Comments

 

             INR (test code = INR) 0.98 1       0.85-1.17                 



[place] [address]-04-12 12:58:00





             Test Item    Value        Reference Range Interpretation Comments

 

             PTT (test code = PTT) 27.3 s       22.9-35.8                 



[place] [address]-04-12 12:58:00





             Test Item    Value        Reference Range Interpretation Comments

 

             Segs (test code = Segs) 71.2         45.0-75.0                 



[place] [address]-04-12 12:58:00





             Test Item    Value        Reference Range Interpretation Comments

 

             Lymphocytes (test code = Lymphocytes) 21.1         20.0-40.0       

          



[place] [address]-04-12 12:58:00





             Test Item    Value        Reference Range Interpretation Comments

 

             Monocytes (test code = Monocytes) 6.8          2.0-12.0            

      



[place] [address]-04-12 12:58:00





             Test Item    Value        Reference Range Interpretation Comments

 

             Eosinophils (test code = 0.1          See_Comment                [A

utomated message] The



             Eosinophils)                                        system which ge

nerated



                                                                 this result tra

nsmitted



                                                                 reference range

: <=4.0.



                                                                 The reference r

zhen was



                                                                 not used to int

erpret



                                                                 this result as



                                                                 normal/abnormal

.



[place] [address]-04-12 12:58:00





             Test Item    Value        Reference Range Interpretation Comments

 

             Basophils (test code = 0.8          See_Comment                [Aut

omated message] The



             Basophils)                                          system which ge

nerated



                                                                 this result tra

nsmitted



                                                                 reference range

: <=1.0.



                                                                 The reference r

zhen was



                                                                 not used to int

erpret



                                                                 this result as



                                                                 normal/abnormal

.



[place] [address]-04-12 12:58:00





             Test Item    Value        Reference Range Interpretation Comments

 

             Neutrophils # (test code = Neutrophils 7.5          1.5-8.1        

           



             #)                                                  



[place] 732852-04-12 12:58:00





             Test Item    Value        Reference Range Interpretation Comments

 

             Lymphocytes # (test code = Lymphocytes 2.2          1.0-5.5        

           



             #)                                                  



[place] 732852-04-12 12:58:00





             Test Item    Value        Reference Range Interpretation Comments

 

             Monocytes # (test code 0.7          See_Comment                [Aut

omated message] The



             = Monocytes #)                                        system which 

generated



                                                                 this result tra

nsmitted



                                                                 reference range

: <=0.8.



                                                                 The reference r

zhen was



                                                                 not used to int

erpret



                                                                 this result as



                                                                 normal/abnormal

.



[place] [address]-04-12 12:58:00





             Test Item    Value        Reference Range Interpretation Comments

 

             Basophils # (test code 0.1          See_Comment                [Aut

omated message] The



             = Basophils #)                                        system which 

generated



                                                                 this result tra

nsmitted



                                                                 reference range

: <=0.2.



                                                                 The reference r

zhen was



                                                                 not used to int

erpret



                                                                 this result as



                                                                 normal/abnormal

.



[place] 924252-04-12 12:58:00





             Test Item    Value        Reference Range Interpretation Comments

 

             Glucose Lvl (test code = Glucose Lvl) 228          70-99           

          



[place] 924252-04-12 12:58:00





             Test Item    Value        Reference Range Interpretation Comments

 

             BUN (test code = BUN) 23           7-22                      



[place] [address]-04-12 12:58:00





             Test Item    Value        Reference Range Interpretation Comments

 

             Creatinine Lvl (test code = Creatinine 0.78         0.50-1.40      

           



             Lvl)                                                



[place] 924252-04-12 12:58:00





             Test Item    Value        Reference Range Interpretation Comments

 

             Sodium Lvl (test code = Sodium Lvl) 138          135-145           

        



[place] 924252-04-12 12:58:00





             Test Item    Value        Reference Range Interpretation Comments

 

             Potassium Lvl (test code = Potassium 4.6          3.5-5.1          

         



             Lvl)                                                



[place] 924252-04-12 12:58:00





             Test Item    Value        Reference Range Interpretation Comments

 

             Chloride Lvl (test code = Chloride Lvl) 108                  

            



[place] 924252-04-12 12:58:00





             Test Item    Value        Reference Range Interpretation Comments

 

             CO2 (test code = CO2) 23           24-32                     



[facility]2022-04-12 12:58:00





             Test Item    Value        Reference Range Interpretation Comments

 

             Calcium Lvl (test code = Calcium Lvl) 9.0          8.5-10.5        

          



[facility]2022-04-12 12:58:00





             Test Item    Value        Reference Range Interpretation Comments

 

             AGAP (test code = AGAP) 11.6         10.0-20.0                 



[facility]2022-04-12 12:58:00





             Test Item    Value        Reference Range Interpretation Comments

 

             eGFR (test code = eGFR) 116                                    



[facility]





             Test Item    Value        Reference Range Interpretation Comments

 

             WBC (test code = WBC) 10.5         3.7-10.4                  



[facility] Center[facility]hzknhmWMKAPUUGVJ6994-26-48 12:58:00





             Test Item    Value        Reference Range Interpretation Comments

 

             RBC (test code = RBC) 5.48         4.70-6.10                 



[facility]radcvbMUGZJJFHMO1003[facility]39-29 12:58:00





             Test Item    Value        Reference Range Interpretation Comments

 

             Hgb (test code = Hgb) 16.0         14.0-18.0                 



[facility] CenterLfihsuqMZVYJFLLQM5594-17-71 12:58:00





             Test Item    Value        Reference Range Interpretation Comments

 

             Hct (test code = Hct) 49.8         42.0-54.0                 



[facility] Center[facility]bayuevUCTQYIPEQH1170-71-29 12:58:00





             Test Item    Value        Reference Range Interpretation Comments

 

             MCV (test code = MCV) 90.8         80.0-94.0                 



[facility] CenterWkjldadNAKLRDHEKK6543-88-48 12:58:00





             Test Item    Value        Reference Range Interpretation Comments

 

             MCH (test code = MCH) 29.1 pg      27.0-31.0                 



[facility] CenterQvsenfkMDKXAVJZEO2698-75-78 12:58:00





             Test Item    Value        Reference Range Interpretation Comments

 

             MCHC (test code = MCHC) 32.1         32.0-36.0                 



[place] 732852-04-12 12:58:00





             Test Item    Value        Reference Range Interpretation Comments

 

             RDW (test code = RDW) 15.5         11.5-14.5                 



[facility] CenterCybqdlwLIXMCAYGAL6562-46-33 12:58:00





             Test Item    Value        Reference Range Interpretation Comments

 

             Platelet (test code = Platelet) 312          133-450               

    



[place] [address]-04-12 12:58:00





             Test Item    Value        Reference Range Interpretation Comments

 

             MPV (test code = MPV) 8.3          7.4-10.4                  



[place] [address]-04-12 12:58:00





             Test Item    Value        Reference Range Interpretation Comments

 

             PT (test code = PT) 12.9 s       12.0-14.7                 



[place] [address]-04-12 12:58:00





             Test Item    Value        Reference Range Interpretation Comments

 

             INR (test code = INR) 0.98 1       0.85-1.17                 



[place] [address]-04-12 12:58:00





             Test Item    Value        Reference Range Interpretation Comments

 

             PTT (test code = PTT) 27.3 s       22.9-35.8                 



[place] [address]-04-12 12:58:00





             Test Item    Value        Reference Range Interpretation Comments

 

             Segs (test code = Segs) 71.2         45.0-75.0                 



[place] [address]-04-12 12:58:00





             Test Item    Value        Reference Range Interpretation Comments

 

             Lymphocytes (test code = Lymphocytes) 21.1         20.0-40.0       

          



[place] [address]-04-12 12:58:00





             Test Item    Value        Reference Range Interpretation Comments

 

             Monocytes (test code = Monocytes) 6.8          2.0-12.0            

      



[place] [address]-04-12 12:58:00





             Test Item    Value        Reference Range Interpretation Comments

 

             Eosinophils (test code = 0.1          See_Comment                [A

utomated message] The



             Eosinophils)                                        system which ge

nerated



                                                                 this result tra

nsmitted



                                                                 reference range

: <=4.0.



                                                                 The reference r

zhen was



                                                                 not used to int

erpret



                                                                 this result as



                                                                 normal/abnormal

.



[place] 732852-04-12 12:58:00





             Test Item    Value        Reference Range Interpretation Comments

 

             Basophils (test code = 0.8          See_Comment                [Aut

omated message] The



             Basophils)                                          system which ge

nerated



                                                                 this result tra

nsmitted



                                                                 reference range

: <=1.0.



                                                                 The reference r

zhen was



                                                                 not used to int

erpret



                                                                 this result as



                                                                 normal/abnormal

.



[place] [address]-04-12 12:58:00





             Test Item    Value        Reference Range Interpretation Comments

 

             Neutrophils # (test code = Neutrophils 7.5          1.5-8.1        

           



             #)                                                  



[place] 732852-04-12 12:58:00





             Test Item    Value        Reference Range Interpretation Comments

 

             Lymphocytes # (test code = Lymphocytes 2.2          1.0-5.5        

           



             #)                                                  



[facility] CenterMtrsryqVGOVPMMSMF4559-92-10 12:58:00





             Test Item    Value        Reference Range Interpretation Comments

 

             Monocytes # (test code 0.7          See_Comment                [Aut

omated message] The



             = Monocytes #)                                        system which 

generated



                                                                 this result tra

nsmitted



                                                                 reference range

: <=0.8.



                                                                 The reference r

zhen was



                                                                 not used to int

erpret



                                                                 this result as



                                                                 normal/abnormal

.



[place] 732852-04-12 12:58:00





             Test Item    Value        Reference Range Interpretation Comments

 

             Basophils # (test code 0.1          See_Comment                [Aut

omated message] The



             = Basophils #)                                        system which 

generated



                                                                 this result tra

nsmitted



                                                                 reference range

: <=0.2.



                                                                 The reference r

zhen was



                                                                 not used to int

erpret



                                                                 this result as



                                                                 normal/abnormal

.



[facility]2022-04-12 12:58:00





             Test Item    Value        Reference Range Interpretation Comments

 

             Glucose Lvl (test code = Glucose Lvl) 228          70-99           

          



[place] 924252-04-12 12:58:00





             Test Item    Value        Reference Range Interpretation Comments

 

             BUN (test code = BUN) 23           7-22                      



[place] 924252-04-12 12:58:00





             Test Item    Value        Reference Range Interpretation Comments

 

             Creatinine Lvl (test code = Creatinine 0.78         0.50-1.40      

           



             Lvl)                                                



[place] 924252-04-12 12:58:00





             Test Item    Value        Reference Range Interpretation Comments

 

             Sodium Lvl (test code = Sodium Lvl) 138          135-145           

        



[place] 924252-04-12 12:58:00





             Test Item    Value        Reference Range Interpretation Comments

 

             Potassium Lvl (test code = Potassium 4.6          3.5-5.1          

         



             Lvl)                                                



[place] 924252-04-12 12:58:00





             Test Item    Value        Reference Range Interpretation Comments

 

             Chloride Lvl (test code = Chloride Lvl) 108                  

            



[place] 924252-04-12 12:58:00





             Test Item    Value        Reference Range Interpretation Comments

 

             CO2 (test code = CO2) 23           24-32                     



[place] 924252-04-12 12:58:00





             Test Item    Value        Reference Range Interpretation Comments

 

             Calcium Lvl (test code = Calcium Lvl) 9.0          8.5-10.5        

          



[facility]2022-04-12 12:58:00





             Test Item    Value        Reference Range Interpretation Comments

 

             AGAP (test code = AGAP) 11.6         10.0-20.0                 



[facility]2022-04-12 12:58:00





             Test Item    Value        Reference Range Interpretation Comments

 

             eGFR (test code = eGFR) 116                                    



[facility] CenterVabfzgsVQMLHBBBTH1083-77-33 12:58:00





             Test Item    Value        Reference Range Interpretation Comments

 

             WBC (test code = WBC) 10.5         3.7-10.4                  



[facility] CenterMryuoudCSJKARTFOY6343-25-45 12:58:00





             Test Item    Value        Reference Range Interpretation Comments

 

             RBC (test code = RBC) 5.48         4.70-6.10                 



[facility] CenterOecoqpfMZBBTICZGJ0984-95-96 12:58:00





             Test Item    Value        Reference Range Interpretation Comments

 

             Hgb (test code = Hgb) 16.0         14.0-18.0                 



[place] 732852-04-12 12:58:00





             Test Item    Value        Reference Range Interpretation Comments

 

             Hct (test code = Hct) 49.8         42.0-54.0                 



[facility] CenterGmehxhcMWSDDDJQNQ2124-71-57 12:58:00





             Test Item    Value        Reference Range Interpretation Comments

 

             MCV (test code = MCV) 90.8         80.0-94.0                 



[place] 732852-04-12 12:58:00





             Test Item    Value        Reference Range Interpretation Comments

 

             MCH (test code = MCH) 29.1 pg      27.0-31.0                 



[place] 732852-04-12 12:58:00





             Test Item    Value        Reference Range Interpretation Comments

 

             MCHC (test code = MCHC) 32.1         32.0-36.0                 



[place] 732852-04-12 12:58:00





             Test Item    Value        Reference Range Interpretation Comments

 

             RDW (test code = RDW) 15.5         11.5-14.5                 



[place] 732852-04-12 12:58:00





             Test Item    Value        Reference Range Interpretation Comments

 

             Platelet (test code = Platelet) 312          323-450               

    



[facility] CenterYrbfwjoCRRMDOQWLP9890-82-43 12:58:00





             Test Item    Value        Reference Range Interpretation Comments

 

             MPV (test code = MPV) 8.3          7.4-10.4                  



[place] 732852-04-12 12:58:00





             Test Item    Value        Reference Range Interpretation Comments

 

             PT (test code = PT) 12.9 s       12.0-14.7                 



[place] 732852-04-12 12:58:00





             Test Item    Value        Reference Range Interpretation Comments

 

             INR (test code = INR) 0.98 1       0.85-1.17                 



[place] [address]-04-12 12:58:00





             Test Item    Value        Reference Range Interpretation Comments

 

             PTT (test code = PTT) 27.3 s       22.9-35.8                 



[place] [address]-04-12 12:58:00





             Test Item    Value        Reference Range Interpretation Comments

 

             Segs (test code = Segs) 71.2         45.0-75.0                 



[place] [address]-04-12 12:58:00





             Test Item    Value        Reference Range Interpretation Comments

 

             Lymphocytes (test code = Lymphocytes) 21.1         20.0-40.0       

          



[place] [address]-04-12 12:58:00





             Test Item    Value        Reference Range Interpretation Comments

 

             Monocytes (test code = Monocytes) 6.8          2.0-12.0            

      



[place] [address]-04-12 12:58:00





             Test Item    Value        Reference Range Interpretation Comments

 

             Eosinophils (test code = 0.1          See_Comment                [A

utomated message] The



             Eosinophils)                                        system which ge

nerated



                                                                 this result tra

nsmitted



                                                                 reference range

: <=4.0.



                                                                 The reference r

zhen was



                                                                 not used to int

erpret



                                                                 this result as



                                                                 normal/abnormal

.



[place] 732852-04-12 12:58:00





             Test Item    Value        Reference Range Interpretation Comments

 

             Basophils (test code = 0.8          See_Comment                [Aut

omated message] The



             Basophils)                                          system which ge

nerated



                                                                 this result tra

nsmitted



                                                                 reference range

: <=1.0.



                                                                 The reference r

zhen was



                                                                 not used to int

erpret



                                                                 this result as



                                                                 normal/abnormal

.



[place] 732852-04-12 12:58:00





             Test Item    Value        Reference Range Interpretation Comments

 

             Neutrophils # (test code = Neutrophils 7.5          1.5-8.1        

           



             #)                                                  



[place] 732852-04-12 12:58:00





             Test Item    Value        Reference Range Interpretation Comments

 

             Lymphocytes # (test code = Lymphocytes 2.2          1.0-5.5        

           



             #)                                                  



[place] 732852-04-12 12:58:00





             Test Item    Value        Reference Range Interpretation Comments

 

             Monocytes # (test code 0.7          See_Comment                [Aut

omated message] The



             = Monocytes #)                                        system which 

generated



                                                                 this result tra

nsmitted



                                                                 reference range

: <=0.8.



                                                                 The reference r

zhen was



                                                                 not used to int

erpret



                                                                 this result as



                                                                 normal/abnormal

.



[facility] CenterMnpdqyzKDXLDJTVPU8492-36-86 12:58:00





             Test Item    Value        Reference Range Interpretation Comments

 

             Basophils # (test code 0.1          See_Comment                [Aut

omated message] The



             = Basophils #)                                        system which 

generated



                                                                 this result tra

nsmitted



                                                                 reference range

: <=0.2.



                                                                 The reference r

zhen was



                                                                 not used to int

erpret



                                                                 this result as



                                                                 normal/abnormal

.



[facility]2022-04-12 12:58:00





             Test Item    Value        Reference Range Interpretation Comments

 

             Glucose Lvl (test code = Glucose Lvl) 228          70-99           

          



[facility]2022-04-12 12:58:00





             Test Item    Value        Reference Range Interpretation Comments

 

             BUN (test code = BUN) 23           7-22                      



[place] 924252-04-12 12:58:00





             Test Item    Value        Reference Range Interpretation Comments

 

             Creatinine Lvl (test code = Creatinine 0.78         0.50-1.40      

           



             Lvl)                                                



[facility]2022-04-12 12:58:00





             Test Item    Value        Reference Range Interpretation Comments

 

             Sodium Lvl (test code = Sodium Lvl) 138          135-145           

        



[place] 924252-04-12 12:58:00





             Test Item    Value        Reference Range Interpretation Comments

 

             Potassium Lvl (test code = Potassium 4.6          3.5-5.1          

         



             Lvl)                                                



[place] 924252-04-12 12:58:00





             Test Item    Value        Reference Range Interpretation Comments

 

             Chloride Lvl (test code = Chloride Lvl) 108                  

            



[place] 924252-04-12 12:58:00





             Test Item    Value        Reference Range Interpretation Comments

 

             CO2 (test code = CO2) 23           24-32                     



[facility] [place]2022-04-12 12:58:00





             Test Item    Value        Reference Range Interpretation Comments

 

             Calcium Lvl (test code = Calcium Lvl) 9.0          8.5-10.5        

          



[place] 924252-04-12 12:58:00





             Test Item    Value        Reference Range Interpretation Comments

 

             AGAP (test code = AGAP) 11.6         10.0-20.0                 



[place] 924252-04-12 12:58:00





             Test Item    Value        Reference Range Interpretation Comments

 

             eGFR (test code = eGFR) 116                                    



[facility]OV9617[facility]-52 12[facility]58:00





             Test Item    Value        Reference Range Interpretation Comments

 

             WBC (test code = WBC) 10.5         3.7-10.4                  



[place] 732852-04-12 12:58:00





             Test Item    Value        Reference Range Interpretation Comments

 

             RBC (test code = RBC) 5.48         4.70-6.10                 



[place] 732852-04-12 12:58:00





             Test Item    Value        Reference Range Interpretation Comments

 

             Hgb (test code = Hgb) 16.0         14.0-18.0                 



[place] [address]-04-12 12:58:00





             Test Item    Value        Reference Range Interpretation Comments

 

             Hct (test code = Hct) 49.8         42.0-54.0                 



[place] 732852-04-12 12:58:00





             Test Item    Value        Reference Range Interpretation Comments

 

             MCV (test code = MCV) 90.8         80.0-94.0                 



[place] [address]-04-12 12:58:00





             Test Item    Value        Reference Range Interpretation Comments

 

             MCH (test code = MCH) 29.1 pg      27.0-31.0                 



[place] 732852-04-12 12:58:00





             Test Item    Value        Reference Range Interpretation Comments

 

             MCHC (test code = MCHC) 32.1         32.0-36.0                 



[place] 732852-04-12 12:58:00





             Test Item    Value        Reference Range Interpretation Comments

 

             RDW (test code = RDW) 15.5         11.5-14.5                 



[place] 732852-04-12 12:58:00





             Test Item    Value        Reference Range Interpretation Comments

 

             Platelet (test code = Platelet) 312          133-450               

    



[facility]yvhiioQSXLQSHZKQ5787[facility]14-33 12:58:00





             Test Item    Value        Reference Range Interpretation Comments

 

             MPV (test code = MPV) 8.3          7.4-10.4                  



[place] 732852-04-12 12:58:00





             Test Item    Value        Reference Range Interpretation Comments

 

             PT (test code = PT) 12.9 s       12.0-14.7                 



[place] 732852-04-12 12:58:00





             Test Item    Value        Reference Range Interpretation Comments

 

             INR (test code = INR) 0.98 1       0.85-1.17                 



[place] 732852-04-12 12:58:00





             Test Item    Value        Reference Range Interpretation Comments

 

             PTT (test code = PTT) 27.3 s       22.9-35.8                 



[facility] CenterScacvyfHINGPFJKQE2449-20-70 12:58:00





             Test Item    Value        Reference Range Interpretation Comments

 

             Segs (test code = Segs) 71.2         45.0-75.0                 



[place] 732852-04-12 12:58:00





             Test Item    Value        Reference Range Interpretation Comments

 

             Lymphocytes (test code = Lymphocytes) 21.1         20.0-40.0       

          



[place] [address]-04-12 12:58:00





             Test Item    Value        Reference Range Interpretation Comments

 

             Monocytes (test code = Monocytes) 6.8          2.0-12.0            

      



[place] 732852-04-12 12:58:00





             Test Item    Value        Reference Range Interpretation Comments

 

             Eosinophils (test code = 0.1          See_Comment                [A

utomated message] The



             Eosinophils)                                        system which ge

nerated



                                                                 this result tra

nsmitted



                                                                 reference range

: <=4.0.



                                                                 The reference r

zhen was



                                                                 not used to int

erpret



                                                                 this result as



                                                                 normal/abnormal

.



[facility] CenterQntjcccDLKANEICNV4560-95-23 12:58:00





             Test Item    Value        Reference Range Interpretation Comments

 

             Basophils (test code = 0.8          See_Comment                [Aut

omated message] The



             Basophils)                                          system which ge

nerated



                                                                 this result tra

nsmitted



                                                                 reference range

: <=1.0.



                                                                 The reference r

zhen was



                                                                 not used to int

erpret



                                                                 this result as



                                                                 normal/abnormal

.



[facility] CenterLeumtuzRDPHKTJZSL0607-38-26 12:58:00





             Test Item    Value        Reference Range Interpretation Comments

 

             Neutrophils # (test code = Neutrophils 7.5          1.5-8.1        

           



             #)                                                  



[place] 732852-04-12 12:58:00





             Test Item    Value        Reference Range Interpretation Comments

 

             Lymphocytes # (test code = Lymphocytes 2.2          1.0-5.5        

           



             #)                                                  



[place] 732852-04-12 12:58:00





             Test Item    Value        Reference Range Interpretation Comments

 

             Monocytes # (test code 0.7          See_Comment                [Aut

omated message] The



             = Monocytes #)                                        system which 

generated



                                                                 this result tra

nsmitted



                                                                 reference range

: <=0.8.



                                                                 The reference r

zhen was



                                                                 not used to int

erpret



                                                                 this result as



                                                                 normal/abnormal

.



[place] 732852-04-12 12:58:00





             Test Item    Value        Reference Range Interpretation Comments

 

             Basophils # (test code 0.1          See_Comment                [Aut

omated message] The



             = Basophils #)                                        system which 

generated



                                                                 this result tra

nsmitted



                                                                 reference range

: <=0.2.



                                                                 The reference r

zhen was



                                                                 not used to int

erpret



                                                                 this result as



                                                                 normal/abnormal

.



[place] 924252-04-12 12:58:00





             Test Item    Value        Reference Range Interpretation Comments

 

             Glucose Lvl (test code = Glucose Lvl) 228          70-99           

          



[place] 924252-04-12 12:58:00





             Test Item    Value        Reference Range Interpretation Comments

 

             BUN (test code = BUN) 23           7-22                      



[place] 924252-04-12 12:58:00





             Test Item    Value        Reference Range Interpretation Comments

 

             Creatinine Lvl (test code = Creatinine 0.78         0.50-1.40      

           



             Lvl)                                                



[facility]2022-04-12 12:58:00





             Test Item    Value        Reference Range Interpretation Comments

 

             Sodium Lvl (test code = Sodium Lvl) 138          135-145           

        



[place] 924252-04-12 12:58:00





             Test Item    Value        Reference Range Interpretation Comments

 

             Potassium Lvl (test code = Potassium 4.6          3.5-5.1          

         



             Lvl)                                                



[place] 924252-04-12 12:58:00





             Test Item    Value        Reference Range Interpretation Comments

 

             Chloride Lvl (test code = Chloride Lvl) 108                  

            



[place] 924252-04-12 12:58:00





             Test Item    Value        Reference Range Interpretation Comments

 

             CO2 (test code = CO2) 23           24-32                     



[place] 924252-04-12 12:58:00





             Test Item    Value        Reference Range Interpretation Comments

 

             Calcium Lvl (test code = Calcium Lvl) 9.0          8.5-10.5        

          



[place] 924252-04-12 12:58:00





             Test Item    Value        Reference Range Interpretation Comments

 

             AGAP (test code = AGAP) 11.6         10.0-20.0                 



[place] 924252-04-12 12:58:00





             Test Item    Value        Reference Range Interpretation Comments

 

             eGFR (test code = eGFR) 116                                    



[place] 732852-04-12 12:58:00





             Test Item    Value        Reference Range Interpretation Comments

 

             WBC (test code = WBC) 10.5         3.7-10.4                  



[facility] CenterFosefxxUDKVNXBFMF6312-22-36 12:58:00





             Test Item    Value        Reference Range Interpretation Comments

 

             RBC (test code = RBC) 5.48         4.70-6.10                 



[facility] CenterNchrvokOEAXVUFFDJ6165-11-50 12:58:00





             Test Item    Value        Reference Range Interpretation Comments

 

             Hgb (test code = Hgb) 16.0         14.0-18.0                 



[place] 732852-04-12 12:58:00





             Test Item    Value        Reference Range Interpretation Comments

 

             Hct (test code = Hct) 49.8         42.0-54.0                 



[facility] CenterCmgfgkjIZBJNLVQXG1457-14-93 12:58:00





             Test Item    Value        Reference Range Interpretation Comments

 

             MCV (test code = MCV) 90.8         80.0-94.0                 



[place] 732852-04-12 12:58:00





             Test Item    Value        Reference Range Interpretation Comments

 

             MCH (test code = MCH) 29.1 pg      27.0-31.0                 



[facility] CenterAwzxuuuKFMJEKWUWR3972-86-27 12:58:00





             Test Item    Value        Reference Range Interpretation Comments

 

             MCHC (test code = MCHC) 32.1         32.0-36.0                 



[facility] CenterEmorogcJUTBSJACSX0614-22-85 12:58:00





             Test Item    Value        Reference Range Interpretation Comments

 

             RDW (test code = RDW) 15.5         11.5-14.5                 



[facility] CenterCogvfsoGHTOTAQHWV6029-48-88 12:58:00





             Test Item    Value        Reference Range Interpretation Comments

 

             Platelet (test code = Platelet) 312          133-450               

    



[facility] CenterNmqzirrTIDRWFCREW1314-68-00 12:58:00





             Test Item    Value        Reference Range Interpretation Comments

 

             MPV (test code = MPV) 8.3          7.4-10.4                  



[facility] CenterQtggnfpWPHXDDHTNX8564-54-69 12:58:00





             Test Item    Value        Reference Range Interpretation Comments

 

             PT (test code = PT) 12.9 s       12.0-14.7                 



[facility] CenterRuumbieXWWHRQYJRW6130-88-05 12:58:00





             Test Item    Value        Reference Range Interpretation Comments

 

             INR (test code = INR) 0.98 1       0.85-1.17                 



[facility] CenterFawwbblNNCVAZWYJC8756-69-54 12:58:00





             Test Item    Value        Reference Range Interpretation Comments

 

             PTT (test code = PTT) 27.3 s       22.9-35.8                 



[place] [address]-04-12 12:58:00





             Test Item    Value        Reference Range Interpretation Comments

 

             Segs (test code = Segs) 71.2         45.0-75.0                 



[place] [address]-04-12 12:58:00





             Test Item    Value        Reference Range Interpretation Comments

 

             Lymphocytes (test code = Lymphocytes) 21.1         20.0-40.0       

          



[place] [address]-04-12 12:58:00





             Test Item    Value        Reference Range Interpretation Comments

 

             Monocytes (test code = Monocytes) 6.8          2.0-12.0            

      



[place] [address]-04-12 12:58:00





             Test Item    Value        Reference Range Interpretation Comments

 

             Eosinophils (test code = 0.1          See_Comment                [A

utomated message] The



             Eosinophils)                                        system which ge

nerated



                                                                 this result tra

nsmitted



                                                                 reference range

: <=4.0.



                                                                 The reference r

zhen was



                                                                 not used to int

erpret



                                                                 this result as



                                                                 normal/abnormal

.



[address]04-12 12:58:00





             Test Item    Value        Reference Range Interpretation Comments

 

             Basophils (test code = 0.8          See_Comment                [Aut

omated message] The



             Basophils)                                          system which ge

nerated



                                                                 this result tra

nsmitted



                                                                 reference range

: <=1.0.



                                                                 The reference r

zhen was



                                                                 not used to int

erpret



                                                                 this result as



                                                                 normal/abnormal

.



[place] [address]-04-12 12:58:00





             Test Item    Value        Reference Range Interpretation Comments

 

             Neutrophils # (test code = Neutrophils 7.5          1.5-8.1        

           



             #)                                                  



[place] [address]-04-12 12:58:00





             Test Item    Value        Reference Range Interpretation Comments

 

             Lymphocytes # (test code = Lymphocytes 2.2          1.0-5.5        

           



             #)                                                  



[place] [address]-04-12 12:58:00





             Test Item    Value        Reference Range Interpretation Comments

 

             Monocytes # (test code 0.7          See_Comment                [Aut

omated message] The



             = Monocytes #)                                        system which 

generated



                                                                 this result tra

nsmitted



                                                                 reference range

: <=0.8.



                                                                 The reference r

zhen was



                                                                 not used to int

erpret



                                                                 this result as



                                                                 normal/abnormal

.



[place] [address]-04-12 12:58:00





             Test Item    Value        Reference Range Interpretation Comments

 

             Basophils # (test code 0.1          See_Comment                [Aut

omated message] The



             = Basophils #)                                        system which 

generated



                                                                 this result tra

nsmitted



                                                                 reference range

: <=0.2.



                                                                 The reference r

zhen was



                                                                 not used to int

erpret



                                                                 this result as



                                                                 normal/abnormal

.



[place]252-04-12 12:58:00





             Test Item    Value        Reference Range Interpretation Comments

 

             Glucose Lvl (test code = Glucose Lvl) 228          70-99           

          



[place] 924252-04-12 12:58:00





             Test Item    Value        Reference Range Interpretation Comments

 

             BUN (test code = BUN) 23           7-22                      



[place] 924252-04-12 12:58:00





             Test Item    Value        Reference Range Interpretation Comments

 

             Creatinine Lvl (test code = Creatinine 0.78         0.50-1.40      

           



             Lvl)                                                



[place] 924252-04-12 12:58:00





             Test Item    Value        Reference Range Interpretation Comments

 

             Sodium Lvl (test code = Sodium Lvl) 138          135-145           

        



[place] 924252-04-12 12:58:00





             Test Item    Value        Reference Range Interpretation Comments

 

             Potassium Lvl (test code = Potassium 4.6          3.5-5.1          

         



             Lvl)                                                



[place] 924252-04-12 12:58:00





             Test Item    Value        Reference Range Interpretation Comments

 

             Chloride Lvl (test code = Chloride Lvl) 108                  

            



[place]252-04-12 12:58:00





             Test Item    Value        Reference Range Interpretation Comments

 

             CO2 (test code = CO2) 23           24-32                     



[facility] [place]2022-04-12 12:58:00





             Test Item    Value        Reference Range Interpretation Comments

 

             Calcium Lvl (test code = Calcium Lvl) 9.0          8.5-10.5        

          



[place]252-04-12 12:58:00





             Test Item    Value        Reference Range Interpretation Comments

 

             AGAP (test code = AGAP) 11.6         10.0-20.0                 



[place] 924252-04-12 12:58:00





             Test Item    Value        Reference Range Interpretation Comments

 

             eGFR (test code = eGFR) 116                                    



[place] 732852-04-12 12:58:00





             Test Item    Value        Reference Range Interpretation Comments

 

             WBC (test code = WBC) 10.5         3.7-10.4                  



[place] 732852-04-12 12:58:00





             Test Item    Value        Reference Range Interpretation Comments

 

             RBC (test code = RBC) 5.48         4.70-6.10                 



[place] [address]-04-12 12:58:00





             Test Item    Value        Reference Range Interpretation Comments

 

             Hgb (test code = Hgb) 16.0         14.0-18.0                 



[facility] CenterQfkxgyeCDBOBYWQPU3765-55-04 12:58:00





             Test Item    Value        Reference Range Interpretation Comments

 

             Hct (test code = Hct) 49.8         42.0-54.0                 



[facility] CenterLpjpyhbZGLKZNHVYR3105-72-76 12:58:00





             Test Item    Value        Reference Range Interpretation Comments

 

             MCV (test code = MCV) 90.8         80.0-94.0                 



[place] 732852-04-12 12:58:00





             Test Item    Value        Reference Range Interpretation Comments

 

             MCH (test code = MCH) 29.1 pg      27.0-31.0                 



[facility] CenterRindpdyURKDFUZMJS9793-58-92 12:58:00





             Test Item    Value        Reference Range Interpretation Comments

 

             MCHC (test code = MCHC) 32.1         32.0-36.0                 



[facility] CenterMkvrgdgTXYUGNWAOL8523-26-53 12:58:00





             Test Item    Value        Reference Range Interpretation Comments

 

             RDW (test code = RDW) 15.5         11.5-14.5                 



[place]32852-04-12 12:58:00





             Test Item    Value        Reference Range Interpretation Comments

 

             Platelet (test code = Platelet) 312          133-450               

    



[facility] CenterZedzdkmQYFKIEPMRK1428-18-58 12:58:00





             Test Item    Value        Reference Range Interpretation Comments

 

             MPV (test code = MPV) 8.3          7.4-10.4                  



[place]32852-04-12 12:58:00





             Test Item    Value        Reference Range Interpretation Comments

 

             PT (test code = PT) 12.9 s       12.0-14.7                 



[place] [address]-04-12 12:58:00





             Test Item    Value        Reference Range Interpretation Comments

 

             INR (test code = INR) 0.98 1       0.85-1.17                 



[place] [address]-04-12 12:58:00





             Test Item    Value        Reference Range Interpretation Comments

 

             PTT (test code = PTT) 27.3 s       22.9-35.8                 



[place] [address]-04-12 12:58:00





             Test Item    Value        Reference Range Interpretation Comments

 

             Segs (test code = Segs) 71.2         45.0-75.0                 



[place] 732852-04-12 12:58:00





             Test Item    Value        Reference Range Interpretation Comments

 

             Lymphocytes (test code = Lymphocytes) 21.1         20.0-40.0       

          



[place]32852-04-12 12:58:00





             Test Item    Value        Reference Range Interpretation Comments

 

             Monocytes (test code = Monocytes) 6.8          2.0-12.0            

      



[place]32852-04-12 12:58:00





             Test Item    Value        Reference Range Interpretation Comments

 

             Eosinophils (test code = 0.1          See_Comment                [A

utomated message] The



             Eosinophils)                                        system which ge

nerated



                                                                 this result tra

nsmitted



                                                                 reference range

: <=4.0.



                                                                 The reference r

zhen was



                                                                 not used to int

erpret



                                                                 this result as



                                                                 normal/abnormal

.



[place] 732852-04-12 12:58:00





             Test Item    Value        Reference Range Interpretation Comments

 

             Basophils (test code = 0.8          See_Comment                [Aut

omated message] The



             Basophils)                                          system which ge

nerated



                                                                 this result tra

nsmitted



                                                                 reference range

: <=1.0.



                                                                 The reference r

zhen was



                                                                 not used to int

erpret



                                                                 this result as



                                                                 normal/abnormal

.



[place]32852-04-12 12:58:00





             Test Item    Value        Reference Range Interpretation Comments

 

             Neutrophils # (test code = Neutrophils 7.5          1.5-8.1        

           



             #)                                                  



[place] 732852-04-12 12:58:00





             Test Item    Value        Reference Range Interpretation Comments

 

             Lymphocytes # (test code = Lymphocytes 2.2          1.0-5.5        

           



             #)                                                  



[place] 732852-04-12 12:58:00





             Test Item    Value        Reference Range Interpretation Comments

 

             Monocytes # (test code 0.7          See_Comment                [Aut

omated message] The



             = Monocytes #)                                        system which 

generated



                                                                 this result tra

nsmitted



                                                                 reference range

: <=0.8.



                                                                 The reference r

zhen was



                                                                 not used to int

erpret



                                                                 this result as



                                                                 normal/abnormal

.



[place] 732852-04-12 12:58:00





             Test Item    Value        Reference Range Interpretation Comments

 

             Basophils # (test code 0.1          See_Comment                [Aut

omated message] The



             = Basophils #)                                        system which 

generated



                                                                 this result tra

nsmitted



                                                                 reference range

: <=0.2.



                                                                 The reference r

zhen was



                                                                 not used to int

erpret



                                                                 this result as



                                                                 normal/abnormal

.



[facility]2022-04-12 12:58:00





             Test Item    Value        Reference Range Interpretation Comments

 

             Glucose Lvl (test code = Glucose Lvl) 228          70-99           

          



[place] 924252-04-12 12:58:00





             Test Item    Value        Reference Range Interpretation Comments

 

             BUN (test code = BUN) 23           7-22                      



[place] 924252-04-12 12:58:00





             Test Item    Value        Reference Range Interpretation Comments

 

             Creatinine Lvl (test code = Creatinine 0.78         0.50-1.40      

           



             Lvl)                                                



[place] 924252-04-12 12:58:00





             Test Item    Value        Reference Range Interpretation Comments

 

             Sodium Lvl (test code = Sodium Lvl) 138          135-145           

        



[place] 924252-04-12 12:58:00





             Test Item    Value        Reference Range Interpretation Comments

 

             Potassium Lvl (test code = Potassium 4.6          3.5-5.1          

         



             Lvl)                                                



[place] 924252-04-12 12:58:00





             Test Item    Value        Reference Range Interpretation Comments

 

             Chloride Lvl (test code = Chloride Lvl) 108                  

            



[place] 924252-04-12 12:58:00





             Test Item    Value        Reference Range Interpretation Comments

 

             CO2 (test code = CO2) 23           24-32                     



[place] 924252-04-12 12:58:00





             Test Item    Value        Reference Range Interpretation Comments

 

             Calcium Lvl (test code = Calcium Lvl) 9.0          8.5-10.5        

          



[place] 924252-04-12 12:58:00





             Test Item    Value        Reference Range Interpretation Comments

 

             AGAP (test code = AGAP) 11.6         10.0-20.0                 



[place] 924252-04-12 12:58:00





             Test Item    Value        Reference Range Interpretation Comments

 

             eGFR (test code = eGFR) 116                                    



[place] 732852-04-12 12:58:00





             Test Item    Value        Reference Range Interpretation Comments

 

             WBC (test code = WBC) 10.5         3.7-10.4                  



[place] [address]-04-12 12:58:00





             Test Item    Value        Reference Range Interpretation Comments

 

             RBC (test code = RBC) 5.48         4.70-6.10                 



[place] [address]-04-12 12:58:00





             Test Item    Value        Reference Range Interpretation Comments

 

             Hgb (test code = Hgb) 16.0         14.0-18.0                 



[place] [address]-04-12 12:58:00





             Test Item    Value        Reference Range Interpretation Comments

 

             Hct (test code = Hct) 49.8         42.0-54.0                 



[place] [address]-04-12 12:58:00





             Test Item    Value        Reference Range Interpretation Comments

 

             MCV (test code = MCV) 90.8         80.0-94.0                 



[place] [address]-04-12 12:58:00





             Test Item    Value        Reference Range Interpretation Comments

 

             MCH (test code = MCH) 29.1 pg      27.0-31.0                 



[place] 732852-04-12 12:58:00





             Test Item    Value        Reference Range Interpretation Comments

 

             MCHC (test code = MCHC) 32.1         32.0-36.0                 



[place] [address]-04-12 12:58:00





             Test Item    Value        Reference Range Interpretation Comments

 

             RDW (test code = RDW) 15.5         11.5-14.5                 



[place] [address]-04-12 12:58:00





             Test Item    Value        Reference Range Interpretation Comments

 

             Platelet (test code = Platelet) 312          133-450               

    



[place] 732852-04-12 12:58:00





             Test Item    Value        Reference Range Interpretation Comments

 

             MPV (test code = MPV) 8.3          7.4-10.4                  



[place] [address]-04-12 12:58:00





             Test Item    Value        Reference Range Interpretation Comments

 

             PT (test code = PT) 12.9 s       12.0-14.7                 



[place] [address]-04-12 12:58:00





             Test Item    Value        Reference Range Interpretation Comments

 

             INR (test code = INR) 0.98 1       0.85-1.17                 



[place] [address]-04-12 12:58:00





             Test Item    Value        Reference Range Interpretation Comments

 

             PTT (test code = PTT) 27.3 s       22.9-35.8                 



[place] [address]-04-12 12:58:00





             Test Item    Value        Reference Range Interpretation Comments

 

             Segs (test code = Segs) 71.2         45.0-75.0                 



[place] [address]-04-12 12:58:00





             Test Item    Value        Reference Range Interpretation Comments

 

             Lymphocytes (test code = Lymphocytes) 21.1         20.0-40.0       

          



[place] [address]-04-12 12:58:00





             Test Item    Value        Reference Range Interpretation Comments

 

             Monocytes (test code = Monocytes) 6.8          2.0-12.0            

      



[place] 732852-04-12 12:58:00





             Test Item    Value        Reference Range Interpretation Comments

 

             Eosinophils (test code = 0.1          See_Comment                [A

utomated message] The



             Eosinophils)                                        system which ge

nerated



                                                                 this result tra

nsmitted



                                                                 reference range

: <=4.0.



                                                                 The reference r

zhen was



                                                                 not used to int

erpret



                                                                 this result as



                                                                 normal/abnormal

.



[id number]





             Test Item    Value        Reference Range Interpretation Comments

 

             Basophils (test code = 0.8          See_Comment                [Aut

omated message] The



             Basophils)                                          system which ge

nerated



                                                                 this result tra

nsmitted



                                                                 reference range

: <=1.0.



                                                                 The reference r

zhen was



                                                                 not used to int

erpret



                                                                 this result as



                                                                 normal/abnormal

.



[id number] 12:58:00





             Test Item    Value        Reference Range Interpretation Comments

 

             Neutrophils # (test code = Neutrophils 7.5          1.5-8.1        

           



             #)                                                  



[facility][id number] 12:58:00





             Test Item    Value        Reference Range Interpretation Comments

 

             Lymphocytes # (test code = Lymphocytes 2.2          1.0-5.5        

           



             #)                                                  



[facility] CenterRqhcstfEKYKHOAGDI3697-80-39 12:58:00





             Test Item    Value        Reference Range Interpretation Comments

 

             Monocytes # (test code 0.7          See_Comment                [Aut

omated message] The



             = Monocytes #)                                        system which 

generated



                                                                 this result tra

nsmitted



                                                                 reference range

: <=0.8.



                                                                 The reference r

zhen was



                                                                 not used to int

erpret



                                                                 this result as



                                                                 normal/abnormal

.



[facility] C[id number] 12:58:00





             Test Item    Value        Reference Range Interpretation Comments

 

             Basophils # (test code 0.1          See_Comment                [Aut

omated message] The



             = Basophils #)                                        system which 

generated



                                                                 this result tra

nsmitted



                                                                 reference range

: <=0.2.



                                                                 The reference r

zhen was



                                                                 not used to int

erpret



                                                                 this result as



                                                                 normal/abnormal

.



[id number]





             Test Item    Value        Reference Range Interpretation Comments

 

             Tube Num CSF (test code = Tube Num CSF) 3 1                        

            



[facility] [id number] 11:59:00





             Test Item    Value        Reference Range Interpretation Comments

 

             Color CSF (test code Colorless (22 6:59                       

    



             = Color CSF) AM)                                    



[facility] [id number] 11:59:00





             Test Item    Value        Reference Range Interpretation Comments

 

             Clarity CSF (test code = Clear (22 6:59                       

    



             Clarity CSF) AM)                                    



[facility]2022-04-12 11:59:00





             Test Item    Value        Reference Range Interpretation Comments

 

             Supernat CSF (test Colorless (22 6:59                         

  



             code = Supernat CSF) AM)                                    



[facility]2022-04-12 11:59:00





             Test Item    Value        Reference Range Interpretation Comments

 

             Nucleated Cells CSF 3            See_Comment                [Automa

huma message] The



             (test code = Nucleated                                        syste

m which generated



             Cells CSF)                                          this result tra

nsmitted



                                                                 reference range

: <=53.



                                                                 The reference r

zhen was



                                                                 not used to int

erpret



                                                                 this result as



                                                                 normal/abnormal

.



[facility]2022-04-12 11:59:00





             Test Item    Value        Reference Range Interpretation Comments

 

             RBC CSF (test code = 64           See_Comment                [Autom

ated message] The



             RBC CSF)                                            system which ge

nerated this



                                                                 result transmit

huma



                                                                 reference range

: <=03. The



                                                                 reference range

 was not



                                                                 used to interpr

et this



                                                                 result as zayda

l/abnormal.



[facility]2022-04-12 11:59:00





             Test Item    Value        Reference Range Interpretation Comments

 

             Comment CSF (test Differential not performed                       

    



             code = Comment CSF) on WBC count of less than                      

     



                          5. Cell counts performed                           



                          on CSF greater than two                           



                          hours after collection may                           



                          not be representative due                           



                          to cellular degradation.                           



[facility]2022-04-12 11:59:00





             Test Item    Value        Reference Range Interpretation Comments

 

             Glucose CSF (test code = Glucose CSF) 129          45-80           

          



[facility]2022-04-12 11:59:00





             Test Item    Value        Reference Range Interpretation Comments

 

             Protein CSF (test code = Protein CSF) 110          15-45           

          



[facility] [id number] 11:59:00





             Test Item    Value        Reference Range Interpretation Comments

 

             Gram Stain Report Gram Stain Performed By:                         

  



             (test code = Gram [facility]                           



             Stain Report) [facility] w/Gram [id number] 11:59:00





             Test Item    Value        Reference Range Interpretation Comments

 

             Culture: CSF w/Gram 48 Hour Report - No                           



             Stain (test code = Growth, Holding                           



             Culture: CSF w/Gram                                        



             Stain)                                              



[facility]2022-04-12 11:59:00





             Test Item    Value        Reference Range Interpretation Comments

 

             Tube Num CSF (test code = Tube Num CSF) 3 1                        

            



[facility] [id number] 11:59:00





             Test Item    Value        Reference Range Interpretation Comments

 

             Color CSF (test code Colorless (22 6:59                       

    



             = Color CSF) AM)                                    



[facility]2022-04-12 11:59:00





             Test Item    Value        Reference Range Interpretation Comments

 

             Clarity CSF (test code = Clear (22 6:59                       

    



             Clarity CSF) AM)                                    



[facility]2022-04-12 11:59:00





             Test Item    Value        Reference Range Interpretation Comments

 

             Supernat CSF (test Colorless (22 6:59                         

  



             code = Supernat CSF) AM)                                    



[facility]2022-04-12 11:59:00





             Test Item    Value        Reference Range Interpretation Comments

 

             Nucleated Cells CSF 3            See_Comment                [Automa

huma message] The



             (test code = Nucleated                                        syste

m which generated



             Cells CSF)                                          this result tra

nsmitted



                                                                 reference range

: <=53.



                                                                 The reference r

zhen was



                                                                 not used to int

erpret



                                                                 this result as



                                                                 normal/abnormal

.



[facility] [id number] 11:59:00





             Test Item    Value        Reference Range Interpretation Comments

 

             RBC CSF (test code = 64           See_Comment                [Autom

ated message] The



             RBC CSF)                                            system which ge

nerated this



                                                                 result transmit

huma



                                                                 reference range

: <=03. The



                                                                 reference range

 was not



                                                                 used to interpr

et this



                                                                 result as zayda

l/abnormal.



[facility] [id number] 11:59:00





             Test Item    Value        Reference Range Interpretation Comments

 

             Comment CSF (test Differential not performed                       

    



             code = Comment CSF) on WBC count of less than                      

     



                          5. Cell counts performed                           



                          on CSF greater than two                           



                          hours after collection may                           



                          not be representative due                           



                          to cellular degradation.                           



[facility] DallasAsc[facility] Software [id number] 11:59:00





             Test Item    Value        Reference Range Interpretation Comments

 

             Glucose CSF (test code = Glucose CSF) 129          45-80           

          



[facility] [id number] 11:59:00





             Test Item    Value        Reference Range Interpretation Comments

 

             Protein CSF (test code = Protein CSF) 110          15-45           

          



[facility] DallasG[facility] [id number] 11:59:00





             Test Item    Value        Reference Range Interpretation Comments

 

             Gram Stain Report Gram Stain Performed By:                         

  



             (test code = Gram [facility]                           



             Stain Report) [facility] CSF w/Gram [id number] 11:59:00





             Test Item    Value        Reference Range Interpretation Comments

 

             Culture: CSF w/Gram 48 Hour Report - No                           



             Stain (test code = Growth, Holding                           



             Culture: CSF w/Gram                                        



             Stain)                                              



[facility]2022-04-12 11:59:00





             Test Item    Value        Reference Range Interpretation Comments

 

             Tube Num CSF (test code = Tube Num CSF) 3 1                        

            



[facility]2022-04-12 11:59:00





             Test Item    Value        Reference Range Interpretation Comments

 

             Color CSF (test code Colorless (22 6:59                       

    



             = Color CSF) AM)                                    



[facility]2022-04-12 11:59:00





             Test Item    Value        Reference Range Interpretation Comments

 

             Clarity CSF (test code = Clear (22 6:59                       

    



             Clarity CSF) AM)                                    



[facility]2022-04-12 11:59:00





             Test Item    Value        Reference Range Interpretation Comments

 

             Supernat CSF (test Colorless (22 6:59                         

  



             code = Supernat CSF) AM)                                    



[facility]2022-04-12 11:59:00





             Test Item    Value        Reference Range Interpretation Comments

 

             Nucleated Cells CSF 3            See_Comment                [Automa

huma message] The



             (test code = Nucleated                                        syste

m which generated



             Cells CSF)                                          this result tra

nsmitted



                                                                 reference range

: <=53.



                                                                 The reference r

zhen was



                                                                 not used to int

erpret



                                                                 this result as



                                                                 normal/abnormal

.



[facility]2022-04-12 11:59:00





             Test Item    Value        Reference Range Interpretation Comments

 

             RBC CSF (test code = 64           See_Comment                [Autom

ated message] The



             RBC CSF)                                            system which ge

nerated this



                                                                 result transmit

huma



                                                                 reference range

: <=03. The



                                                                 reference range

 was not



                                                                 used to interpr

et this



                                                                 result as zayda

l/abnormal.



[facility]2022-04-12 11:59:00





             Test Item    Value        Reference Range Interpretation Comments

 

             Comment CSF (test Differential not performed                       

    



             code = Comment CSF) on WBC count of less than                      

     



                          5. Cell counts performed                           



                          on CSF greater than two                           



                          hours after collection may                           



                          not be representative due                           



                          to cellular degradation.                           



[facility]2022-04-12 11:59:00





             Test Item    Value        Reference Range Interpretation Comments

 

             Glucose CSF (test code = Glucose CSF) 129          45-80           

          



[facility]2022-04-12 11:59:00





             Test Item    Value        Reference Range Interpretation Comments

 

             Protein CSF (test code = Protein CSF) 110          15-45           

          



[id number]





             Test Item    Value        Reference Range Interpretation Comments

 

             Gram Stain Report Gram Stain Performed By:                         

  



             (test code = Gram [facility]                           



             Stain Report) [facility]ram [id number] 11:59:00





             Test Item    Value        Reference Range Interpretation Comments

 

             Culture: CSF w/Gram 48 Hour Report - No                           



             Stain (test code = Growth, Holding                           



             Culture: CSF w/Gram                                        



             Stain)                                              



[facility] [id number] 11:59:00





             Test Item    Value        Reference Range Interpretation Comments

 

             Tube Num CSF (test code = Tube Num CSF) 3 1                        

            



[facility]2022-04-12 11:59:00





             Test Item    Value        Reference Range Interpretation Comments

 

             Color CSF (test code Colorless (22 6:59                       

    



             = Color CSF) AM)                                    



[facility]2022-04-12 11:59:00





             Test Item    Value        Reference Range Interpretation Comments

 

             Clarity CSF (test code = Clear (22 6:59                       

    



             Clarity CSF) AM)                                    



[facility]2022-04-12 11:59:00





             Test Item    Value        Reference Range Interpretation Comments

 

             Supernat CSF (test Colorless (22 6:59                         

  



             code = Supernat CSF) AM)                                    



[facility]2022-04-12 11:59:00





             Test Item    Value        Reference Range Interpretation Comments

 

             Nucleated Cells CSF 3            See_Comment                [Automa

huma message] The



             (test code = Nucleated                                        syste

m which generated



             Cells CSF)                                          this result tra

nsmitted



                                                                 reference range

: <=53.



                                                                 The reference r

zhen was



                                                                 not used to int

erpret



                                                                 this result as



                                                                 normal/abnormal

.



[facility]2022-04-12 11:59:00





             Test Item    Value        Reference Range Interpretation Comments

 

             RBC CSF (test code = 64           See_Comment                [Autom

ated message] The



             RBC CSF)                                            system which ge

nerated this



                                                                 result transmit

huma



                                                                 reference range

: <=03. The



                                                                 reference range

 was not



                                                                 used to interpr

et this



                                                                 result as zayda

l/abnormal.



[facility]2022-04-12 11:59:00





             Test Item    Value        Reference Range Interpretation Comments

 

             Comment CSF (test Differential not performed                       

    



             code = Comment CSF) on WBC count of less than                      

     



                          5. Cell counts performed                           



                          on CSF greater than two                           



                          hours after collection may                           



                          not be representative due                           



                          to cellular degradation.                           



[facility] [id number] 11:59:00





             Test Item    Value        Reference Range Interpretation Comments

 

             Glucose CSF (test code = Glucose CSF) 129          45-80           

          



[facility] [id number] 11:59:00





             Test Item    Value        Reference Range Interpretation Comments

 

             Protein CSF (test code = Protein CSF) 110          15-45           

          



[facility] [id number] 11:59:00





             Test Item    Value        Reference Range Interpretation Comments

 

             Gram Stain Report Gram Stain Performed By:                         

  



             (test code = Gram [facility]                           



             Stain Report) [facility] w/Gram [id number] 11:59:00





             Test Item    Value        Reference Range Interpretation Comments

 

             Culture: CSF w/Gram 48 Hour Report - No                           



             Stain (test code = Growth, Holding                           



             Culture: CSF w/Gram                                        



             Stain)                                              



[facility]2022-04-12 11:59:00





             Test Item    Value        Reference Range Interpretation Comments

 

             Tube Num CSF (test code = Tube Num CSF) 3 1                        

            



[facility]2022-04-12 11:59:00





             Test Item    Value        Reference Range Interpretation Comments

 

             Color CSF (test code Colorless (22 6:59                       

    



             = Color CSF) AM)                                    



[facility]2022-04-12 11:59:00





             Test Item    Value        Reference Range Interpretation Comments

 

             Clarity CSF (test code = Clear (22 6:59                       

    



             Clarity CSF) AM)                                    



[facility]2022-04-12 11:59:00





             Test Item    Value        Reference Range Interpretation Comments

 

             Supernat CSF (test Colorless (22 6:59                         

  



             code = Supernat CSF) AM)                                    



[facility]2022-04-12 11:59:00





             Test Item    Value        Reference Range Interpretation Comments

 

             Nucleated Cells CSF 3            See_Comment                [Automa

huma message] The



             (test code = Nucleated                                        syste

m which generated



             Cells CSF)                                          this result tra

nsmitted



                                                                 reference range

: <=53.



                                                                 The reference r

zhen was



                                                                 not used to int

erpret



                                                                 this result as



                                                                 normal/abnormal

.



[facility]2022-04-12 11:59:00





             Test Item    Value        Reference Range Interpretation Comments

 

             RBC CSF (test code = 64           See_Comment                [Autom

ated message] The



             RBC CSF)                                            system which ge

nerated this



                                                                 result transmit

huma



                                                                 reference range

: <=03. The



                                                                 reference range

 was not



                                                                 used to interpr

et this



                                                                 result as zayda

l/abnormal.



[facility]2022-04-12 11:59:00





             Test Item    Value        Reference Range Interpretation Comments

 

             Comment CSF (test Differential not performed                       

    



             code = Comment CSF) on WBC count of less than                      

     



                          5. Cell counts performed                           



                          on CSF greater than two                           



                          hours after collection may                           



                          not be representative due                           



                          to cellular degradation.                           



[facility] [id number] 11:59:00





             Test Item    Value        Reference Range Interpretation Comments

 

             Glucose CSF (test code = Glucose CSF) 129          45-80           

          



[facility] [id number] 11:59:00





             Test Item    Value        Reference Range Interpretation Comments

 

             Protein CSF (test code = Protein CSF) 110          15-45           

          



[facility] [id number] 11:59:00





             Test Item    Value        Reference Range Interpretation Comments

 

             Gram Stain Report Gram Stain Performed By:                         

  



             (test code = Gram [facility]                           



             Stain Report) [facility] w/Gram [id number] 11:59:00





             Test Item    Value        Reference Range Interpretation Comments

 

             Culture: CSF w/Gram 48 Hour Report - No                           



             Stain (test code = Growth, Holding                           



             Culture: CSF w/Gram                                        



             Stain)                                              



[facility]2022-04-12 11:59:00





             Test Item    Value        Reference Range Interpretation Comments

 

             Tube Num CSF (test code = Tube Num CSF) 3 1                        

            



[facility] [id number] 11:59:00





             Test Item    Value        Reference Range Interpretation Comments

 

             Color CSF (test code Colorless (22 6:59                       

    



             = Color CSF) AM)                                    



[facility]2022-04-12 11:59:00





             Test Item    Value        Reference Range Interpretation Comments

 

             Clarity CSF (test code = Clear (22 6:59                       

    



             Clarity CSF) AM)                                    



[facility]2022-04-12 11:59:00





             Test Item    Value        Reference Range Interpretation Comments

 

             Supernat CSF (test Colorless (22 6:59                         

  



             code = Supernat CSF) AM)                                    



[facility]2022-04-12 11:59:00





             Test Item    Value        Reference Range Interpretation Comments

 

             Nucleated Cells CSF 3            See_Comment                [Automa

huma message] The



             (test code = Nucleated                                        syste

m which generated



             Cells CSF)                                          this result tra

nsmitted



                                                                 reference range

: <=53.



                                                                 The reference r

zhen was



                                                                 not used to int

erpret



                                                                 this result as



                                                                 normal/abnormal

.



[facility]2022-04-12 11:59:00





             Test Item    Value        Reference Range Interpretation Comments

 

             RBC CSF (test code = 64           See_Comment                [Autom

ated message] The



             RBC CSF)                                            system which ge

nerated this



                                                                 result transmit

huma



                                                                 reference range

: <=03. The



                                                                 reference range

 was not



                                                                 used to interpr

et this



                                                                 result as zayda

l/abnormal.



[facility]2022-04-12 11:59:00





             Test Item    Value        Reference Range Interpretation Comments

 

             Comment CSF (test Differential not performed                       

    



             code = Comment CSF) on WBC count of less than                      

     



                          5. Cell counts performed                           



                          on CSF greater than two                           



                          hours after collection may                           



                          not be representative due                           



                          to cellular degradation.                           



[facility] [id number] 11:59:00





             Test Item    Value        Reference Range Interpretation Comments

 

             Glucose CSF (test code = Glucose CSF) 129          45-80           

          



[facility]2022-04-12 11:59:00





             Test Item    Value        Reference Range Interpretation Comments

 

             Protein CSF (test code = Protein CSF) 110          15-45           

          



[facility] [id number] 11:59:00





             Test Item    Value        Reference Range Interpretation Comments

 

             Gram Stain Report Gram Stain Performed By:                         

  



             (test code = Gram [facility]                           



             Stain Report) [facility] w/Gram [id number] 11:59:00





             Test Item    Value        Reference Range Interpretation Comments

 

             Culture: CSF w/Gram 48 Hour Report - No                           



             Stain (test code = Growth, Holding                           



             Culture: CSF w/Gram                                        



             Stain)                                              



[facility]2022-04-12 11:59:00





             Test Item    Value        Reference Range Interpretation Comments

 

             Tube Num CSF (test code = Tube Num CSF) 3 1                        

            



[facility]2022-04-12 11:59:00





             Test Item    Value        Reference Range Interpretation Comments

 

             Color CSF (test code Colorless (22 6:59                       

    



             = Color CSF) AM)                                    



[facility]2022-04-12 11:59:00





             Test Item    Value        Reference Range Interpretation Comments

 

             Clarity CSF (test code = Clear (22 6:59                       

    



             Clarity CSF) AM)                                    



[facility]2022-04-12 11:59:00





             Test Item    Value        Reference Range Interpretation Comments

 

             Supernat CSF (test Colorless (22 6:59                         

  



             code = Supernat CSF) AM)                                    



[facility]2022-04-12 11:59:00





             Test Item    Value        Reference Range Interpretation Comments

 

             Nucleated Cells CSF 3            See_Comment                [Automa

huma message] The



             (test code = Nucleated                                        syste

m which generated



             Cells CSF)                                          this result tra

nsmitted



                                                                 reference range

: <=53.



                                                                 The reference r

zhen was



                                                                 not used to int

erpret



                                                                 this result as



                                                                 normal/abnormal

.



[facility]2022-04-12 11:59:00





             Test Item    Value        Reference Range Interpretation Comments

 

             RBC CSF (test code = 64           See_Comment                [Autom

ated message] The



             RBC CSF)                                            system which ge

nerated this



                                                                 result transmit

huma



                                                                 reference range

: <=03. The



                                                                 reference range

 was not



                                                                 used to interpr

et this



                                                                 result as zayda

l/abnormal.



[facility]2022-04-12 11:59:00





             Test Item    Value        Reference Range Interpretation Comments

 

             Comment CSF (test Differential not performed                       

    



             code = Comment CSF) on WBC count of less than                      

     



                          5. Cell counts performed                           



                          on CSF greater than two                           



                          hours after collection may                           



                          not be representative due                           



                          to cellular degradation.                           



[facility]2022-04-12 11:59:00





             Test Item    Value        Reference Range Interpretation Comments

 

             Glucose CSF (test code = Glucose CSF) 129          45-80           

          



[facility]2022-04-12 11:59:00





             Test Item    Value        Reference Range Interpretation Comments

 

             Protein CSF (test code = Protein CSF) 110          15-45           

          



[facility] [id number] 11:59:00





             Test Item    Value        Reference Range Interpretation Comments

 

             Gram Stain Report Gram Stain Performed By:                         

  



             (test code = Gram [facility]                           



             Stain Report) [facility] w/Gram [id number] 11:59:00





             Test Item    Value        Reference Range Interpretation Comments

 

             Culture: CSF w/Gram 48 Hour Report - No                           



             Stain (test code = Growth, Holding                           



             Culture: CSF w/Gram                                        



             Stain)                                              



[facility]2022-04-12 11:59:00





             Test Item    Value        Reference Range Interpretation Comments

 

             Tube Num CSF (test code = Tube Num CSF) 3 1                        

            



[facility]2022-04-12 11:59:00





             Test Item    Value        Reference Range Interpretation Comments

 

             Color CSF (test code Colorless (22 6:59                       

    



             = Color CSF) AM)                                    



[facility]2022-04-12 11:59:00





             Test Item    Value        Reference Range Interpretation Comments

 

             Clarity CSF (test code = Clear (22 6:59                       

    



             Clarity CSF) AM)                                    



[facility]2022-04-12 11:59:00





             Test Item    Value        Reference Range Interpretation Comments

 

             Supernat CSF (test Colorless (22 6:59                         

  



             code = Supernat CSF) AM)                                    



[facility]2022-04-12 11:59:00





             Test Item    Value        Reference Range Interpretation Comments

 

             Nucleated Cells CSF 3            See_Comment                [Automa

huma message] The



             (test code = Nucleated                                        syste

m which generated



             Cells CSF)                                          this result tra

nsmitted



                                                                 reference range

: <=53.



                                                                 The reference r

zhen was



                                                                 not used to int

erpret



                                                                 this result as



                                                                 normal/abnormal

.



[facility]2022-04-12 11:59:00





             Test Item    Value        Reference Range Interpretation Comments

 

             RBC CSF (test code = 64           See_Comment                [Autom

ated message] The



             RBC CSF)                                            system which ge

nerated this



                                                                 result transmit

huma



                                                                 reference range

: <=03. The



                                                                 reference range

 was not



                                                                 used to interpr

et this



                                                                 result as zayda

l/abnormal.



[facility]2022-04-12 11:59:00





             Test Item    Value        Reference Range Interpretation Comments

 

             Comment CSF (test Differential not performed                       

    



             code = Comment CSF) on WBC count of less than                      

     



                          5. Cell counts performed                           



                          on CSF greater than two                           



                          hours after collection may                           



                          not be representative due                           



                          to cellular degradation.                           



[facility] [id number] 11:59:00





             Test Item    Value        Reference Range Interpretation Comments

 

             Glucose CSF (test code = Glucose CSF) 129          45-80           

          



[facility] [id number] 11:59:00





             Test Item    Value        Reference Range Interpretation Comments

 

             Protein CSF (test code = Protein CSF) 110          15-45           

          



[id number] 11:59:00





             Test Item    Value        Reference Range Interpretation Comments

 

             Gram Stain Report Gram Stain Performed By:                         

  



             (test code = Gram [facility]                           



             Stain Report) [facility] [id number] 11:59:00





             Test Item    Value        Reference Range Interpretation Comments

 

             Culture: CSF w/Gram 48 Hour Report - No                           



             Stain (test code = Growth, Holding                           



             Culture: CSF w/Gram                                        



             Stain)                                              



[facility] [id number] 14:46:00





             Test Item    Value        Reference Range Interpretation Comments

 

             Hgb A1C (test code = Hgb A1C) 5.6                                  

  



[facility] [id number] 14:46:00





             Test Item    Value        Reference Range Interpretation Comments

 

             Hgb A1C (test code = Hgb A1C) 5.6                                  

  



[facility] [id number] 14:46:00





             Test Item    Value        Reference Range Interpretation Comments

 

             Hgb A1C (test code = Hgb A1C) 5.6                                  

  



[facility]2022-04-11 14:46:00





             Test Item    Value        Reference Range Interpretation Comments

 

             Hgb A1C (test code = Hgb A1C) 5.6                                  

  



[facility]2022-04-11 14:46:00





             Test Item    Value        Reference Range Interpretation Comments

 

             Hgb A1C (test code = Hgb A1C) 5.6                                  

  



[place] [id number] 14:46:00





             Test Item    Value        Reference Range Interpretation Comments

 

             Hgb A1C (test code = Hgb A1C) 5.6                                  

  



[facility]2022-04-11 14:46:00





             Test Item    Value        Reference Range Interpretation Comments

 

             Hgb A1C (test code = Hgb A1C) 5.6                                  

  



[facility]2022-04-11 14:46:00





             Test Item    Value        Reference Range Interpretation Comments

 

             Hgb A1C (test code = Hgb A1C) 5.6                                  

  



[facility]2022-04-11 11:43:00





             Test Item    Value        Reference Range Interpretation Comments

 

             Glucose Lvl (test code = Glucose Lvl) 418          70-99           

          



[facility]2022-04-11 11:43:00





             Test Item    Value        Reference Range Interpretation Comments

 

             BUN (test code = BUN) 22           7-22                      



[facility]2022-04-11 11:43:00





             Test Item    Value        Reference Range Interpretation Comments

 

             Creatinine Lvl (test code = Creatinine 1.10         0.50-1.40      

           



             Lvl)                                                



[facility]2022-04-11 11:43:00





             Test Item    Value        Reference Range Interpretation Comments

 

             Sodium Lvl (test code = Sodium Lvl) 138          135-145           

        



[facility]2022-04-11 11:43:00





             Test Item    Value        Reference Range Interpretation Comments

 

             Potassium Lvl (test code = Potassium 4.9          3.5-5.1          

         



             Lvl)                                                



[facility]2022-04-11 11:43:00





             Test Item    Value        Reference Range Interpretation Comments

 

             Chloride Lvl (test code = Chloride Lvl) 113                  

            



[facility]2022-04-11 11:43:00





             Test Item    Value        Reference Range Interpretation Comments

 

             CO2 (test code = CO2) 16           24-32                     



[facility]2022-04-11 11:43:00





             Test Item    Value        Reference Range Interpretation Comments

 

             AGAP (test code = AGAP) 13.9         10.0-20.0                 



[facility]2022-04-11 11:43:00





             Test Item    Value        Reference Range Interpretation Comments

 

             Calcium Lvl (test code = Calcium Lvl) 9.0          8.5-10.5        

          



[facility]2022-04-11 11:43:00





             Test Item    Value        Reference Range Interpretation Comments

 

             eGFR (test code = eGFR) 86                                     



[facility]owq[facility]flXWNHAUPRHA5584[facility] 11[facility]43:00





             Test Item    Value        Reference Range Interpretation Comments

 

             WBC (test code = WBC) 10.2         3.7-10.4                  



[facility] CenterGuqepmoDWLGPGZWOE1300-97-90 11:43:00





             Test Item    Value        Reference Range Interpretation Comments

 

             RBC (test code = RBC) 5.46         4.70-6.10                 



[facility] CenterFjpzouaZNRMWWIIRN3370-25-43 11:43:00





             Test Item    Value        Reference Range Interpretation Comments

 

             Hgb (test code = Hgb) 16.3         14.0-18.0                 



[facility] CenterYmlumddXHYLPTMLOW2671-01-77 11:43:00





             Test Item    Value        Reference Range Interpretation Comments

 

             Hct (test code = Hct) 50.0         42.0-54.0                 



[facility] CenterWpzlzljNFSSVVEUSS4368-42-92 11:43:00





             Test Item    Value        Reference Range Interpretation Comments

 

             MCV (test code = MCV) 91.4         80.0-94.0                 



[facility] C[id number] 11:43:00





             Test Item    Value        Reference Range Interpretation Comments

 

             MCH (test code = MCH) 29.9 pg      27.0-31.0                 



[facility] CenterPhzfjtyESQQVFLSEJ9571-31-96 11:43:00





             Test Item    Value        Reference Range Interpretation Comments

 

             MCHC (test code = MCHC) 32.7         32.0-36.0                 



[facility] CenterYhcazvzBEAGTHWVRV8776-48-22 11:43:00





             Test Item    Value        Reference Range Interpretation Comments

 

             RDW (test code = RDW) 15.7         11.5-14.5                 



[facility] CenterJrluzlxXDPXQGWBTF4315-54-35 11:43:00





             Test Item    Value        Reference Range Interpretation Comments

 

             Platelet (test code = Platelet) 321          133-450               

    



[facility] CenterSzedzwxTZWYRVHTQN5614-96-67 11:43:00





             Test Item    Value        Reference Range Interpretation Comments

 

             MPV (test code = MPV) 8.6          7.4-10.4                  



[facility] CenterWoaqsvcSAJCUDWEVK2218-46-32 11:43:00





             Test Item    Value        Reference Range Interpretation Comments

 

             Segs (test code = Segs) 92.0         45.0-75.0                 



[facility] CenterHceqtfoBAQPGSKBSV0666-36-76 11:43:00





             Test Item    Value        Reference Range Interpretation Comments

 

             Lymphocytes (test code = Lymphocytes) 5.2          20.0-40.0       

          



[place] 732852-04-11 11:43:00





             Test Item    Value        Reference Range Interpretation Comments

 

             Monocytes (test code = Monocytes) 1.6          2.0-12.0            

      



[place] 732852-04-11 11:43:00





             Test Item    Value        Reference Range Interpretation Comments

 

             Basophils (test code = 1.2          See_Comment                [Aut

omated message] The



             Basophils)                                          system which ge

nerated



                                                                 this result tra

nsmitted



                                                                 reference range

: <=1.0.



                                                                 The reference r

zhen was



                                                                 not used to int

erpret



                                                                 this result as



                                                                 normal/abnormal

.



[facility] [id number] 11:43:00





             Test Item    Value        Reference Range Interpretation Comments

 

             Neutrophils # (test code = Neutrophils 9.4          1.5-8.1        

           



             #)                                                  



[facility] CenterVlrxefhVKMVZQPLTX4533-43-01 11:43:00





             Test Item    Value        Reference Range Interpretation Comments

 

             Lymphocytes # (test code = Lymphocytes 0.5          1.0-5.5        

           



             #)                                                  



[facility] CenterCedksohVDHETEOTQO3703-62-52 11:43:00





             Test Item    Value        Reference Range Interpretation Comments

 

             Monocytes # (test code 0.2          See_Comment                [Aut

omated message] The



             = Monocytes #)                                        system which 

generated



                                                                 this result tra

nsmitted



                                                                 reference range

: <=0.8.



                                                                 The reference r

zhen was



                                                                 not used to int

erpret



                                                                 this result as



                                                                 normal/abnormal

.



[facility] CenterRgxajviYBDSFFVBHG6599-79-89 11:43:00





             Test Item    Value        Reference Range Interpretation Comments

 

             Basophils # (test code 0.1          See_Comment                [Aut

omated message] The



             = Basophils #)                                        system which 

generated



                                                                 this result tra

nsmitted



                                                                 reference range

: <=0.2.



                                                                 The reference r

zhen was



                                                                 not used to int

erpret



                                                                 this result as



                                                                 normal/abnormal

.



[facility]2022-04-11 11:43:00





             Test Item    Value        Reference Range Interpretation Comments

 

             Glucose Lvl (test code = Glucose Lvl) 418          70-99           

          



[facility]2022-04-11 11:43:00





             Test Item    Value        Reference Range Interpretation Comments

 

             BUN (test code = BUN) 22           7-22                      



[place] 924252-04-11 11:43:00





             Test Item    Value        Reference Range Interpretation Comments

 

             Creatinine Lvl (test code = Creatinine 1.10         0.50-1.40      

           



             Lvl)                                                



[place] 924252-04-11 11:43:00





             Test Item    Value        Reference Range Interpretation Comments

 

             Sodium Lvl (test code = Sodium Lvl) 138          135-145           

        



[place] 924252-04-11 11:43:00





             Test Item    Value        Reference Range Interpretation Comments

 

             Potassium Lvl (test code = Potassium 4.9          3.5-5.1          

         



             Lvl)                                                



[facility]2022-04-11 11:43:00





             Test Item    Value        Reference Range Interpretation Comments

 

             Chloride Lvl (test code = Chloride Lvl) 113                  

            



[facility]2022-04-11 11:43:00





             Test Item    Value        Reference Range Interpretation Comments

 

             CO2 (test code = CO2) 16           24-32                     



[place] 924252-04-11 11:43:00





             Test Item    Value        Reference Range Interpretation Comments

 

             AGAP (test code = AGAP) 13.9         10.0-20.0                 



[facility]2022-04-11 11:43:00





             Test Item    Value        Reference Range Interpretation Comments

 

             Calcium Lvl (test code = Calcium Lvl) 9.0          8.5-10.5        

          



[place] 924252-04-11 11:43:00





             Test Item    Value        Reference Range Interpretation Comments

 

             eGFR (test code = eGFR) 86                                     



[facility] CenterUczeiznYTLOUZVVYS8165-62-44 11:43:00





             Test Item    Value        Reference Range Interpretation Comments

 

             WBC (test code = WBC) 10.2         3.7-10.4                  



[place] 732852-04-11 11:43:00





             Test Item    Value        Reference Range Interpretation Comments

 

             RBC (test code = RBC) 5.46         4.70-6.10                 



[place] 732852-04-11 11:43:00





             Test Item    Value        Reference Range Interpretation Comments

 

             Hgb (test code = Hgb) 16.3         14.0-18.0                 



[place] 732852-04-11 11:43:00





             Test Item    Value        Reference Range Interpretation Comments

 

             Hct (test code = Hct) 50.0         42.0-54.0                 



[facility] CenterBaxsnsvNCDNVFGMXO1846-47-12 11:43:00





             Test Item    Value        Reference Range Interpretation Comments

 

             MCV (test code = MCV) 91.4         80.0-94.0                 



[facility] CenterPegahfdTEJBGASQBQ3319-84-51 11:43:00





             Test Item    Value        Reference Range Interpretation Comments

 

             MCH (test code = MCH) 29.9 pg      27.0-31.0                 



[facility] CenterWmhfezrNYQMLYKUSC7955-52-49 11:43:00





             Test Item    Value        Reference Range Interpretation Comments

 

             MCHC (test code = MCHC) 32.7         32.0-36.0                 



[facility] CenterRqcebqhAAIJRIZIMW3420-35-86 11:43:00





             Test Item    Value        Reference Range Interpretation Comments

 

             RDW (test code = RDW) 15.7         11.5-14.5                 



[facility] CenterCcymffwUCCCZGORQN7690-37-64 11:43:00





             Test Item    Value        Reference Range Interpretation Comments

 

             Platelet (test code = Platelet) 321          133-450               

    



[facility] CenterXbtlasgIIFGFFCAWO7532-54-37 11:43:00





             Test Item    Value        Reference Range Interpretation Comments

 

             MPV (test code = MPV) 8.6          7.4-10.4                  



[facility] CenterIidzfxoFNXWGSHNFY3291-05-23 11:43:00





             Test Item    Value        Reference Range Interpretation Comments

 

             Segs (test code = Segs) 92.0         45.0-75.0                 



[facility] CenterNceiyqhHZXHDTUTAV0321-39-99 11:43:00





             Test Item    Value        Reference Range Interpretation Comments

 

             Lymphocytes (test code = Lymphocytes) 5.2          20.0-40.0       

          



[facility] CenterGttovrfURVYQAKAMC4383-13-05 11:43:00





             Test Item    Value        Reference Range Interpretation Comments

 

             Monocytes (test code = Monocytes) 1.6          2.0-12.0            

      



[facility] CenterDmqpfsyALTZDEBDDJ4641-18-61 11:43:00





             Test Item    Value        Reference Range Interpretation Comments

 

             Basophils (test code = 1.2          See_Comment                [Aut

omated message] The



             Basophils)                                          system which ge

nerated



                                                                 this result tra

nsmitted



                                                                 reference range

: <=1.0.



                                                                 The reference r

zhen was



                                                                 not used to int

erpret



                                                                 this result as



                                                                 normal/abnormal

.



[facility] CenterOeztuamBIUDEKDNTF2334-66-44 11:43:00





             Test Item    Value        Reference Range Interpretation Comments

 

             Neutrophils # (test code = Neutrophils 9.4          1.5-8.1        

           



             #)                                                  



[place] 732852-04-11 11:43:00





             Test Item    Value        Reference Range Interpretation Comments

 

             Lymphocytes # (test code = Lymphocytes 0.5          1.0-5.5        

           



             #)                                                  



[facility] CenterTquqhioNDWDGVONEO2292-68-08 11:43:00





             Test Item    Value        Reference Range Interpretation Comments

 

             Monocytes # (test code 0.2          See_Comment                [Aut

omated message] The



             = Monocytes #)                                        system which 

generated



                                                                 this result tra

nsmitted



                                                                 reference range

: <=0.8.



                                                                 The reference r

zhen was



                                                                 not used to int

erpret



                                                                 this result as



                                                                 normal/abnormal

.



[place] 732852-04-11 11:43:00





             Test Item    Value        Reference Range Interpretation Comments

 

             Basophils # (test code 0.1          See_Comment                [Aut

omated message] The



             = Basophils #)                                        system which 

generated



                                                                 this result tra

nsmitted



                                                                 reference range

: <=0.2.



                                                                 The reference r

zhen was



                                                                 not used to int

erpret



                                                                 this result as



                                                                 normal/abnormal

.



[facility]2022-04-11 11:43:00





             Test Item    Value        Reference Range Interpretation Comments

 

             Glucose Lvl (test code = Glucose Lvl) 418          70-99           

          



[facility]2022-04-11 11:43:00





             Test Item    Value        Reference Range Interpretation Comments

 

             BUN (test code = BUN) 22           7-22                      



[place] 924252-04-11 11:43:00





             Test Item    Value        Reference Range Interpretation Comments

 

             Creatinine Lvl (test code = Creatinine 1.10         0.50-1.40      

           



             Lvl)                                                



[place] 924252-04-11 11:43:00





             Test Item    Value        Reference Range Interpretation Comments

 

             Sodium Lvl (test code = Sodium Lvl) 138          135-145           

        



[place] 924252-04-11 11:43:00





             Test Item    Value        Reference Range Interpretation Comments

 

             Potassium Lvl (test code = Potassium 4.9          3.5-5.1          

         



             Lvl)                                                



[facility]2022-04-11 11:43:00





             Test Item    Value        Reference Range Interpretation Comments

 

             Chloride Lvl (test code = Chloride Lvl) 113                  

            



[place] 924252-04-11 11:43:00





             Test Item    Value        Reference Range Interpretation Comments

 

             CO2 (test code = CO2) 16           24-32                     



[place] 924252-04-11 11:43:00





             Test Item    Value        Reference Range Interpretation Comments

 

             AGAP (test code = AGAP) 13.9         10.0-20.0                 



[facility]2022-04-11 11:43:00





             Test Item    Value        Reference Range Interpretation Comments

 

             Calcium Lvl (test code = Calcium Lvl) 9.0          8.5-10.5        

          



[facility]2022-04-11 11:43:00





             Test Item    Value        Reference Range Interpretation Comments

 

             eGFR (test code = eGFR) 86                                     



[facility]OAMHKDF2267[facility]-30 11[facility]43:00





             Test Item    Value        Reference Range Interpretation Comments

 

             WBC (test code = WBC) 10.2         3.7-10.4                  



[facility] CenterEmnykpyUFIYPWQDFT4923-84-88 11:43:00





             Test Item    Value        Reference Range Interpretation Comments

 

             RBC (test code = RBC) 5.46         4.70-6.10                 



[facility] CenterXztkkpxEASJXOTFFG3210-02-61 11:43:00





             Test Item    Value        Reference Range Interpretation Comments

 

             Hgb (test code = Hgb) 16.3         14.0-18.0                 



[facility] CenterDiizmwdFFCUHPFRRW2376-67-69 11:43:00





             Test Item    Value        Reference Range Interpretation Comments

 

             Hct (test code = Hct) 50.0         42.0-54.0                 



[facility] CenterThoaksmCKSRKFQPWX2283-49-87 11:43:00





             Test Item    Value        Reference Range Interpretation Comments

 

             MCV (test code = MCV) 91.4         80.0-94.0                 



[facility] CenterVhntnfgTZAALOPCAH6936-25-76 11:43:00





             Test Item    Value        Reference Range Interpretation Comments

 

             MCH (test code = MCH) 29.9 pg      27.0-31.0                 



[facility] CenterOejxviiLPJREGIJTS7964-73-59 11:43:00





             Test Item    Value        Reference Range Interpretation Comments

 

             MCHC (test code = MCHC) 32.7         32.0-36.0                 



[facility] CenterWggbguiCXIEBPDYYN6892-42-29 11:43:00





             Test Item    Value        Reference Range Interpretation Comments

 

             RDW (test code = RDW) 15.7         11.5-14.5                 



[facility] CenterKynonfmJXUYCUUOEJ9872-43-28 11:43:00





             Test Item    Value        Reference Range Interpretation Comments

 

             Platelet (test code = Platelet) 321          133-450               

    



[facility] CenterXjkvjhqDBCUNZBVNI9873-03-44 11:43:00





             Test Item    Value        Reference Range Interpretation Comments

 

             MPV (test code = MPV) 8.6          7.4-10.4                  



[place] 732852-04-11 11:43:00





             Test Item    Value        Reference Range Interpretation Comments

 

             Segs (test code = Segs) 92.0         45.0-75.0                 



[place] 732852-04-11 11:43:00





             Test Item    Value        Reference Range Interpretation Comments

 

             Lymphocytes (test code = Lymphocytes) 5.2          20.0-40.0       

          



[place] 732852-04-11 11:43:00





             Test Item    Value        Reference Range Interpretation Comments

 

             Monocytes (test code = Monocytes) 1.6          2.0-12.0            

      



[place] [address]-04-11 11:43:00





             Test Item    Value        Reference Range Interpretation Comments

 

             Basophils (test code = 1.2          See_Comment                [Aut

omated message] The



             Basophils)                                          system which ge

nerated



                                                                 this result tra

nsmitted



                                                                 reference range

: <=1.0.



                                                                 The reference r

zhen was



                                                                 not used to int

erpret



                                                                 this result as



                                                                 normal/abnormal

.



[place] 732852-04-11 11:43:00





             Test Item    Value        Reference Range Interpretation Comments

 

             Neutrophils # (test code = Neutrophils 9.4          1.5-8.1        

           



             #)                                                  



[place] 732852-04-11 11:43:00





             Test Item    Value        Reference Range Interpretation Comments

 

             Lymphocytes # (test code = Lymphocytes 0.5          1.0-5.5        

           



             #)                                                  



[place] 732852-04-11 11:43:00





             Test Item    Value        Reference Range Interpretation Comments

 

             Monocytes # (test code 0.2          See_Comment                [Aut

omated message] The



             = Monocytes #)                                        system which 

generated



                                                                 this result tra

nsmitted



                                                                 reference range

: <=0.8.



                                                                 The reference r

zhen was



                                                                 not used to int

erpret



                                                                 this result as



                                                                 normal/abnormal

.



[place] [address]-04-11 11:43:00





             Test Item    Value        Reference Range Interpretation Comments

 

             Basophils # (test code 0.1          See_Comment                [Aut

omated message] The



             = Basophils #)                                        system which 

generated



                                                                 this result tra

nsmitted



                                                                 reference range

: <=0.2.



                                                                 The reference r

zhen was



                                                                 not used to int

erpret



                                                                 this result as



                                                                 normal/abnormal

.



[facility] [id number] 11[facility]43:00





             Test Item    Value        Reference Range Interpretation Comments

 

             Glucose Lvl (test code = Glucose Lvl) 418          70-99           

          



[facility] [id number] 11:43:00





             Test Item    Value        Reference Range Interpretation Comments

 

             BUN (test code = BUN) 22           7-22                      



[place] 924252-04-11 11:43:00





             Test Item    Value        Reference Range Interpretation Comments

 

             Creatinine Lvl (test code = Creatinine 1.10         0.50-1.40      

           



             Lvl)                                                



[facility]2022-04-11 11:43:00





             Test Item    Value        Reference Range Interpretation Comments

 

             Sodium Lvl (test code = Sodium Lvl) 138          135-145           

        



[place] 924252-04-11 11:43:00





             Test Item    Value        Reference Range Interpretation Comments

 

             Potassium Lvl (test code = Potassium 4.9          3.5-5.1          

         



             Lvl)                                                



[place] 924252-04-11 11:43:00





             Test Item    Value        Reference Range Interpretation Comments

 

             Chloride Lvl (test code = Chloride Lvl) 113                  

            



[facility]2022-04-11 11:43:00





             Test Item    Value        Reference Range Interpretation Comments

 

             CO2 (test code = CO2) 16           24-32                     



[place] 924252-04-11 11:43:00





             Test Item    Value        Reference Range Interpretation Comments

 

             AGAP (test code = AGAP) 13.9         10.0-20.0                 



[facility]2022-04-11 11:43:00





             Test Item    Value        Reference Range Interpretation Comments

 

             Calcium Lvl (test code = Calcium Lvl) 9.0          8.5-10.5        

          



[facility]2022-04-11 11:43:00





             Test Item    Value        Reference Range Interpretation Comments

 

             eGFR (test code = eGFR) 86                                     



[place] 732852-04-11 11:43:00





             Test Item    Value        Reference Range Interpretation Comments

 

             WBC (test code = WBC) 10.2         3.7-10.4                  



[place] 732852-04-11 11:43:00





             Test Item    Value        Reference Range Interpretation Comments

 

             RBC (test code = RBC) 5.46         4.70-6.10                 



[place] 732852-04-11 11:43:00





             Test Item    Value        Reference Range Interpretation Comments

 

             Hgb (test code = Hgb) 16.3         14.0-18.0                 



[place] 732852-04-11 11:43:00





             Test Item    Value        Reference Range Interpretation Comments

 

             Hct (test code = Hct) 50.0         42.0-54.0                 



[place] 732852-04-11 11:43:00





             Test Item    Value        Reference Range Interpretation Comments

 

             MCV (test code = MCV) 91.4         80.0-94.0                 



[place] 732852-04-11 11:43:00





             Test Item    Value        Reference Range Interpretation Comments

 

             MCH (test code = MCH) 29.9 pg      27.0-31.0                 



[place] 732852-04-11 11:43:00





             Test Item    Value        Reference Range Interpretation Comments

 

             MCHC (test code = MCHC) 32.7         32.0-36.0                 



[place] 732852-04-11 11:43:00





             Test Item    Value        Reference Range Interpretation Comments

 

             RDW (test code = RDW) 15.7         11.5-14.5                 



[place] 732852-04-11 11:43:00





             Test Item    Value        Reference Range Interpretation Comments

 

             Platelet (test code = Platelet) 321          133-450               

    



[facility] CenterWynflchRRAWRPCZQM9438-09-89 11:43:00





             Test Item    Value        Reference Range Interpretation Comments

 

             MPV (test code = MPV) 8.6          7.4-10.4                  



[facility] CenterCwuwzxhBVBUEQCXSH3560-41-53 11:43:00





             Test Item    Value        Reference Range Interpretation Comments

 

             Segs (test code = Segs) 92.0         45.0-75.0                 



[facility] CenterKaqfwgmGTGOIPUUIV1612-92-30 11:43:00





             Test Item    Value        Reference Range Interpretation Comments

 

             Lymphocytes (test code = Lymphocytes) 5.2          20.0-40.0       

          



[facility] CenterYruhmmdVQYHFPIEFC8966-49-17 11:43:00





             Test Item    Value        Reference Range Interpretation Comments

 

             Monocytes (test code = Monocytes) 1.6          2.0-12.0            

      



[place] 732852-04-11 11:43:00





             Test Item    Value        Reference Range Interpretation Comments

 

             Basophils (test code = 1.2          See_Comment                [Aut

omated message] The



             Basophils)                                          system which ge

nerated



                                                                 this result tra

nsmitted



                                                                 reference range

: <=1.0.



                                                                 The reference r

zhen was



                                                                 not used to int

erpret



                                                                 this result as



                                                                 normal/abnormal

.



[facility] CenterAbnzgpoTYJBYVDAFG3752-64-80 11:43:00





             Test Item    Value        Reference Range Interpretation Comments

 

             Neutrophils # (test code = Neutrophils 9.4          1.5-8.1        

           



             #)                                                  



[facility] CenterUqvydsqZNARJXBVIK5575-23-32 11:43:00





             Test Item    Value        Reference Range Interpretation Comments

 

             Lymphocytes # (test code = Lymphocytes 0.5          1.0-5.5        

           



             #)                                                  



[facility] CenterIpcftxvIJUUHYHUPR3854-64-56 11:43:00





             Test Item    Value        Reference Range Interpretation Comments

 

             Monocytes # (test code 0.2          See_Comment                [Aut

omated message] The



             = Monocytes #)                                        system which 

generated



                                                                 this result tra

nsmitted



                                                                 reference range

: <=0.8.



                                                                 The reference r

zhen was



                                                                 not used to int

erpret



                                                                 this result as



                                                                 normal/abnormal

.



[place] 732852-04-11 11:43:00





             Test Item    Value        Reference Range Interpretation Comments

 

             Basophils # (test code 0.1          See_Comment                [Aut

omated message] The



             = Basophils #)                                        system which 

generated



                                                                 this result tra

nsmitted



                                                                 reference range

: <=0.2.



                                                                 The reference r

zhen was



                                                                 not used to int

erpret



                                                                 this result as



                                                                 normal/abnormal

.



[facility]2022-04-11 11:43:00





             Test Item    Value        Reference Range Interpretation Comments

 

             Glucose Lvl (test code = Glucose Lvl) 418          70-99           

          



[facility]2022-04-11 11:43:00





             Test Item    Value        Reference Range Interpretation Comments

 

             BUN (test code = BUN) 22           7-22                      



[place] 924252-04-11 11:43:00





             Test Item    Value        Reference Range Interpretation Comments

 

             Creatinine Lvl (test code = Creatinine 1.10         0.50-1.40      

           



             Lvl)                                                



[place] 924252-04-11 11:43:00





             Test Item    Value        Reference Range Interpretation Comments

 

             Sodium Lvl (test code = Sodium Lvl) 138          135-145           

        



[place] 924252-04-11 11:43:00





             Test Item    Value        Reference Range Interpretation Comments

 

             Potassium Lvl (test code = Potassium 4.9          3.5-5.1          

         



             Lvl)                                                



[place] 924252-04-11 11:43:00





             Test Item    Value        Reference Range Interpretation Comments

 

             Chloride Lvl (test code = Chloride Lvl) 113                  

            



[facility]2022-04-11 11:43:00





             Test Item    Value        Reference Range Interpretation Comments

 

             CO2 (test code = CO2) 16           24-32                     



[place] 924252-04-11 11:43:00





             Test Item    Value        Reference Range Interpretation Comments

 

             AGAP (test code = AGAP) 13.9         10.0-20.0                 



[facility]2022-04-11 11:43:00





             Test Item    Value        Reference Range Interpretation Comments

 

             Calcium Lvl (test code = Calcium Lvl) 9.0          8.5-10.5        

          



[facility]2022-04-11 11:43:00





             Test Item    Value        Reference Range Interpretation Comments

 

             eGFR (test code = eGFR) 86                                     



[facility] CenterCtqwslxNEXHUGNVZU7647-18-00 11:43:00





             Test Item    Value        Reference Range Interpretation Comments

 

             WBC (test code = WBC) 10.2         3.7-10.4                  



[facility] CenterKbzhqwdZGZQEREDBW0910-38-68 11:43:00





             Test Item    Value        Reference Range Interpretation Comments

 

             RBC (test code = RBC) 5.46         4.70-6.10                 



[facility] CenterAuvvnoeGARTCWMEWM9654-19-52 11:43:00





             Test Item    Value        Reference Range Interpretation Comments

 

             Hgb (test code = Hgb) 16.3         14.0-18.0                 



[facility] CenterLbwsmjxXHZWRGRDQF3353-99-65 11:43:00





             Test Item    Value        Reference Range Interpretation Comments

 

             Hct (test code = Hct) 50.0         42.0-54.0                 



[facility] CenterFqzwwahOMBSDSOAFG0693-83-30 11:43:00





             Test Item    Value        Reference Range Interpretation Comments

 

             MCV (test code = MCV) 91.4         80.0-94.0                 



[facility] CenterSmxsvaoDVPFUZOWKC8704-67-86 11:43:00





             Test Item    Value        Reference Range Interpretation Comments

 

             MCH (test code = MCH) 29.9 pg      27.0-31.0                 



[facility] CenterSmwrzraNEJMZTMQGG1354-63-10 11:43:00





             Test Item    Value        Reference Range Interpretation Comments

 

             MCHC (test code = MCHC) 32.7         32.0-36.0                 



[facility] CenterKxvjlvdPJZJKXEZYO2700-76-11 11:43:00





             Test Item    Value        Reference Range Interpretation Comments

 

             RDW (test code = RDW) 15.7         11.5-14.5                 



[place] 732852-04-11 11:43:00





             Test Item    Value        Reference Range Interpretation Comments

 

             Platelet (test code = Platelet) 321          382-450               

    



[facility] CenterNklnhxeMVMGXFCWCS1865-62-04 11:43:00





             Test Item    Value        Reference Range Interpretation Comments

 

             MPV (test code = MPV) 8.6          7.4-10.4                  



[facility] CenterBvnsdocCLOWBDGCOP9862-01-14 11:43:00





             Test Item    Value        Reference Range Interpretation Comments

 

             Segs (test code = Segs) 92.0         45.0-75.0                 



[place] [address]-04-11 11:43:00





             Test Item    Value        Reference Range Interpretation Comments

 

             Lymphocytes (test code = Lymphocytes) 5.2          20.0-40.0       

          



[place] [address]-04-11 11:43:00





             Test Item    Value        Reference Range Interpretation Comments

 

             Monocytes (test code = Monocytes) 1.6          2.0-12.0            

      



[place] [address]-04-11 11:43:00





             Test Item    Value        Reference Range Interpretation Comments

 

             Basophils (test code = 1.2          See_Comment                [Aut

omated message] The



             Basophils)                                          system which ge

nerated



                                                                 this result tra

nsmitted



                                                                 reference range

: <=1.0.



                                                                 The reference r

zhen was



                                                                 not used to int

erpret



                                                                 this result as



                                                                 normal/abnormal

.



[place] [address]-04-11 11:43:00





             Test Item    Value        Reference Range Interpretation Comments

 

             Neutrophils # (test code = Neutrophils 9.4          1.5-8.1        

           



             #)                                                  



[place] 732852-04-11 11:43:00





             Test Item    Value        Reference Range Interpretation Comments

 

             Lymphocytes # (test code = Lymphocytes 0.5          1.0-5.5        

           



             #)                                                  



[place] [address]-04-11 11:43:00





             Test Item    Value        Reference Range Interpretation Comments

 

             Monocytes # (test code 0.2          See_Comment                [Aut

omated message] The



             = Monocytes #)                                        system which 

generated



                                                                 this result tra

nsmitted



                                                                 reference range

: <=0.8.



                                                                 The reference r

zhen was



                                                                 not used to int

erpret



                                                                 this result as



                                                                 normal/abnormal

.



[place] 732852-04-11 11:43:00





             Test Item    Value        Reference Range Interpretation Comments

 

             Basophils # (test code 0.1          See_Comment                [Aut

omated message] The



             = Basophils #)                                        system which 

generated



                                                                 this result tra

nsmitted



                                                                 reference range

: <=0.2.



                                                                 The reference r

zhen was



                                                                 not used to int

erpret



                                                                 this result as



                                                                 normal/abnormal

.



[place] 924252-04-11 11:43:00





             Test Item    Value        Reference Range Interpretation Comments

 

             Glucose Lvl (test code = Glucose Lvl) 418          70-99           

          



[place] 924252-04-11 11:43:00





             Test Item    Value        Reference Range Interpretation Comments

 

             BUN (test code = BUN) 22           7-22                      



[facility]2022-04-11 11:43:00





             Test Item    Value        Reference Range Interpretation Comments

 

             Creatinine Lvl (test code = Creatinine 1.10         0.50-1.40      

           



             Lvl)                                                



[facility]2022-04-11 11:43:00





             Test Item    Value        Reference Range Interpretation Comments

 

             Sodium Lvl (test code = Sodium Lvl) 138          135-145           

        



[place] 924252-04-11 11:43:00





             Test Item    Value        Reference Range Interpretation Comments

 

             Potassium Lvl (test code = Potassium 4.9          3.5-5.1          

         



             Lvl)                                                



[facility]2022-04-11 11:43:00





             Test Item    Value        Reference Range Interpretation Comments

 

             Chloride Lvl (test code = Chloride Lvl) 113                  

            



[facility]2022-04-11 11:43:00





             Test Item    Value        Reference Range Interpretation Comments

 

             CO2 (test code = CO2) 16           24-32                     



[place] 924252-04-11 11:43:00





             Test Item    Value        Reference Range Interpretation Comments

 

             AGAP (test code = AGAP) 13.9         10.0-20.0                 



[facility]2022-04-11 11:43:00





             Test Item    Value        Reference Range Interpretation Comments

 

             Calcium Lvl (test code = Calcium Lvl) 9.0          8.5-10.5        

          



[facility]2022-04-11 11:43:00





             Test Item    Value        Reference Range Interpretation Comments

 

             eGFR (test code = eGFR) 86                                     



[facility]





             Test Item    Value        Reference Range Interpretation Comments

 

             WBC (test code = WBC) 10.2         3.7-10.4                  



[place] 732852-04-11 11:43:00





             Test Item    Value        Reference Range Interpretation Comments

 

             RBC (test code = RBC) 5.46         4.70-6.10                 



[place] 732852-04-11 11:43:00





             Test Item    Value        Reference Range Interpretation Comments

 

             Hgb (test code = Hgb) 16.3         14.0-18.0                 



[place] 732852-04-11 11:43:00





             Test Item    Value        Reference Range Interpretation Comments

 

             Hct (test code = Hct) 50.0         42.0-54.0                 



[place] 732852-04-11 11:43:00





             Test Item    Value        Reference Range Interpretation Comments

 

             MCV (test code = MCV) 91.4         80.0-94.0                 



[place] 732852-04-11 11:43:00





             Test Item    Value        Reference Range Interpretation Comments

 

             MCH (test code = MCH) 29.9 pg      27.0-31.0                 



[place] 732852-04-11 11:43:00





             Test Item    Value        Reference Range Interpretation Comments

 

             MCHC (test code = MCHC) 32.7         32.0-36.0                 



[place] 732852-04-11 11:43:00





             Test Item    Value        Reference Range Interpretation Comments

 

             RDW (test code = RDW) 15.7         11.5-14.5                 



[place] 732852-04-11 11:43:00





             Test Item    Value        Reference Range Interpretation Comments

 

             Platelet (test code = Platelet) 321          133-450               

    



[facility] CenterOizjluqHBMJYCPSER1921[facility] 11:43:00





             Test Item    Value        Reference Range Interpretation Comments

 

             MPV (test code = MPV) 8.6          7.4-10.4                  



[facility] CenterUxjgnvjIXMDRJIKGQ1588-81-64 11:43:00





             Test Item    Value        Reference Range Interpretation Comments

 

             Segs (test code = Segs) 92.0         45.0-75.0                 



[facility] CenterPxwbvckSZJNLABNEN2803-84-14 11:43:00





             Test Item    Value        Reference Range Interpretation Comments

 

             Lymphocytes (test code = Lymphocytes) 5.2          20.0-40.0       

          



[place] 732852-04-11 11:43:00





             Test Item    Value        Reference Range Interpretation Comments

 

             Monocytes (test code = Monocytes) 1.6          2.0-12.0            

      



[place] 732852-04-11 11:43:00





             Test Item    Value        Reference Range Interpretation Comments

 

             Basophils (test code = 1.2          See_Comment                [Aut

omated message] The



             Basophils)                                          system which ge

nerated



                                                                 this result tra

nsmitted



                                                                 reference range

: <=1.0.



                                                                 The reference r

zhen was



                                                                 not used to int

erpret



                                                                 this result as



                                                                 normal/abnormal

.



[facility] CenterWrlmbvjKMNBFYZGIO5938-45-41 11:43:00





             Test Item    Value        Reference Range Interpretation Comments

 

             Neutrophils # (test code = Neutrophils 9.4          1.5-8.1        

           



             #)                                                  



[place] 732852-04-11 11:43:00





             Test Item    Value        Reference Range Interpretation Comments

 

             Lymphocytes # (test code = Lymphocytes 0.5          1.0-5.5        

           



             #)                                                  



[facility] CenterFiitycsBXQYIYTWME0217-45-71 11:43:00





             Test Item    Value        Reference Range Interpretation Comments

 

             Monocytes # (test code 0.2          See_Comment                [Aut

omated message] The



             = Monocytes #)                                        system which 

generated



                                                                 this result tra

nsmitted



                                                                 reference range

: <=0.8.



                                                                 The reference r

zhen was



                                                                 not used to int

erpret



                                                                 this result as



                                                                 normal/abnormal

.



[place] 732852-04-11 11:43:00





             Test Item    Value        Reference Range Interpretation Comments

 

             Basophils # (test code 0.1          See_Comment                [Aut

omated message] The



             = Basophils #)                                        system which 

generated



                                                                 this result tra

nsmitted



                                                                 reference range

: <=0.2.



                                                                 The reference r

zhen was



                                                                 not used to int

erpret



                                                                 this result as



                                                                 normal/abnormal

.



[facility]2022-04-11 11:43:00





             Test Item    Value        Reference Range Interpretation Comments

 

             Glucose Lvl (test code = Glucose Lvl) 418          70-99           

          



[facility] [id number] 11[facility]43:00





             Test Item    Value        Reference Range Interpretation Comments

 

             BUN (test code = BUN) 22           7-22                      



[place] 924252-04-11 11:43:00





             Test Item    Value        Reference Range Interpretation Comments

 

             Creatinine Lvl (test code = Creatinine 1.10         0.50-1.40      

           



             Lvl)                                                



[facility]2022-04-11 11:43:00





             Test Item    Value        Reference Range Interpretation Comments

 

             Sodium Lvl (test code = Sodium Lvl) 138          135-145           

        



[facility]2022-04-11 11:43:00





             Test Item    Value        Reference Range Interpretation Comments

 

             Potassium Lvl (test code = Potassium 4.9          3.5-5.1          

         



             Lvl)                                                



[facility]2022-04-11 11:43:00





             Test Item    Value        Reference Range Interpretation Comments

 

             Chloride Lvl (test code = Chloride Lvl) 113                  

            



[facility] [id number] 11:43:00





             Test Item    Value        Reference Range Interpretation Comments

 

             CO2 (test code = CO2) 16           24-32                     



[place] 924252-04-11 11:43:00





             Test Item    Value        Reference Range Interpretation Comments

 

             AGAP (test code = AGAP) 13.9         10.0-20.0                 



[place] 924252-04-11 11:43:00





             Test Item    Value        Reference Range Interpretation Comments

 

             Calcium Lvl (test code = Calcium Lvl) 9.0          8.5-10.5        

          



[facility]2022-04-11 11:43:00





             Test Item    Value        Reference Range Interpretation Comments

 

             eGFR (test code = eGFR) 86                                     



[facility] CenterDbuuonlIZBBLXKYPO6042-49-42 11:43:00





             Test Item    Value        Reference Range Interpretation Comments

 

             WBC (test code = WBC) 10.2         3.7-10.4                  



[facility] CenterHlwlyrqHCKQVQSYWP9781-43-25 11:43:00





             Test Item    Value        Reference Range Interpretation Comments

 

             RBC (test code = RBC) 5.46         4.70-6.10                 



[facility] CenterEyrfkxaPAJUSYMPNK9455-34-31 11:43:00





             Test Item    Value        Reference Range Interpretation Comments

 

             Hgb (test code = Hgb) 16.3         14.0-18.0                 



[facility] CenterIoopxriEVVTJWGBVP0430-78-86 11:43:00





             Test Item    Value        Reference Range Interpretation Comments

 

             Hct (test code = Hct) 50.0         42.0-54.0                 



[facility] CenterXjpwppsEDHDIDRORV9006-27-41 11:43:00





             Test Item    Value        Reference Range Interpretation Comments

 

             MCV (test code = MCV) 91.4         80.0-94.0                 



[facility] CenterIvqysvxDBBHAVVMDL3587-20-83 11:43:00





             Test Item    Value        Reference Range Interpretation Comments

 

             MCH (test code = MCH) 29.9 pg      27.0-31.0                 



[facility] CenterIwsfjovTLWVBJWEJZ4614-83-35 11:43:00





             Test Item    Value        Reference Range Interpretation Comments

 

             MCHC (test code = MCHC) 32.7         32.0-36.0                 



[facility] CenterOhyhiphGKTKLPEKBA9506-22-60 11:43:00





             Test Item    Value        Reference Range Interpretation Comments

 

             RDW (test code = RDW) 15.7         11.5-14.5                 



[facility] CenterZsjwtdkBQRTDRDBST1639-44-66 11:43:00





             Test Item    Value        Reference Range Interpretation Comments

 

             Platelet (test code = Platelet) 321          133-450               

    



[facility] CenterAymnxmbKOTRWVUOGV8011-07-51 11:43:00





             Test Item    Value        Reference Range Interpretation Comments

 

             MPV (test code = MPV) 8.6          7.4-10.4                  



[facility] CenterCddxgotQHUTSFTNBI9239-28-15 11:43:00





             Test Item    Value        Reference Range Interpretation Comments

 

             Segs (test code = Segs) 92.0         45.0-75.0                 



[place] [address]-04-11 11:43:00





             Test Item    Value        Reference Range Interpretation Comments

 

             Lymphocytes (test code = Lymphocytes) 5.2          20.0-40.0       

          



[place] [address]-04-11 11:43:00





             Test Item    Value        Reference Range Interpretation Comments

 

             Monocytes (test code = Monocytes) 1.6          2.0-12.0            

      



[place] 732852-04-11 11:43:00





             Test Item    Value        Reference Range Interpretation Comments

 

             Basophils (test code = 1.2          See_Comment                [Aut

omated message] The



             Basophils)                                          system which ge

nerated



                                                                 this result tra

nsmitted



                                                                 reference range

: <=1.0.



                                                                 The reference r

zhen was



                                                                 not used to int

erpret



                                                                 this result as



                                                                 normal/abnormal

.



[place] 732852-04-11 11:43:00





             Test Item    Value        Reference Range Interpretation Comments

 

             Neutrophils # (test code = Neutrophils 9.4          1.5-8.1        

           



             #)                                                  



[place] 732852-04-11 11:43:00





             Test Item    Value        Reference Range Interpretation Comments

 

             Lymphocytes # (test code = Lymphocytes 0.5          1.0-5.5        

           



             #)                                                  



[place] 732852-04-11 11:43:00





             Test Item    Value        Reference Range Interpretation Comments

 

             Monocytes # (test code 0.2          See_Comment                [Aut

omated message] The



             = Monocytes #)                                        system which 

generated



                                                                 this result tra

nsmitted



                                                                 reference range

: <=0.8.



                                                                 The reference r

zhen was



                                                                 not used to int

erpret



                                                                 this result as



                                                                 normal/abnormal

.



[place] 732852-04-11 11:43:00





             Test Item    Value        Reference Range Interpretation Comments

 

             Basophils # (test code 0.1          See_Comment                [Aut

omated message] The



             = Basophils #)                                        system which 

generated



                                                                 this result tra

nsmitted



                                                                 reference range

: <=0.2.



                                                                 The reference r

zhen was



                                                                 not used to int

erpret



                                                                 this result as



                                                                 normal/abnormal

.



[facility] [id number] 11:43:00





             Test Item    Value        Reference Range Interpretation Comments

 

             Glucose Lvl (test code = Glucose Lvl) 418          70-99           

          



[facility]2022-04-11 11:43:00





             Test Item    Value        Reference Range Interpretation Comments

 

             BUN (test code = BUN) 22           7-22                      



[facility] [id number] 11:43:00





             Test Item    Value        Reference Range Interpretation Comments

 

             Creatinine Lvl (test code = Creatinine 1.10         0.50-1.40      

           



             Lvl)                                                



[facility]2022-04-11 11:43:00





             Test Item    Value        Reference Range Interpretation Comments

 

             Sodium Lvl (test code = Sodium Lvl) 138          135-145           

        



[place] 924252-04-11 11:43:00





             Test Item    Value        Reference Range Interpretation Comments

 

             Potassium Lvl (test code = Potassium 4.9          3.5-5.1          

         



             Lvl)                                                



[facility]2022-04-11 11:43:00





             Test Item    Value        Reference Range Interpretation Comments

 

             Chloride Lvl (test code = Chloride Lvl) 113                  

            



[place] 924252-04-11 11:43:00





             Test Item    Value        Reference Range Interpretation Comments

 

             CO2 (test code = CO2) 16           24-32                     



[place] 924252-04-11 11:43:00





             Test Item    Value        Reference Range Interpretation Comments

 

             AGAP (test code = AGAP) 13.9         10.0-20.0                 



[facility]2022-04-11 11:43:00





             Test Item    Value        Reference Range Interpretation Comments

 

             Calcium Lvl (test code = Calcium Lvl) 9.0          8.5-10.5        

          



[facility]2022-04-11 11:43:00





             Test Item    Value        Reference Range Interpretation Comments

 

             eGFR (test code = eGFR) 86                                     



[facility] CenterVucdnxeRWTCRMNANM6778-62-42 11:43:00





             Test Item    Value        Reference Range Interpretation Comments

 

             WBC (test code = WBC) 10.2         3.7-10.4                  



[place] 732852-04-11 11:43:00





             Test Item    Value        Reference Range Interpretation Comments

 

             RBC (test code = RBC) 5.46         4.70-6.10                 



[place] 732852-04-11 11:43:00





             Test Item    Value        Reference Range Interpretation Comments

 

             Hgb (test code = Hgb) 16.3         14.0-18.0                 



[place] 732852-04-11 11:43:00





             Test Item    Value        Reference Range Interpretation Comments

 

             Hct (test code = Hct) 50.0         42.0-54.0                 



[place] 732852-04-11 11:43:00





             Test Item    Value        Reference Range Interpretation Comments

 

             MCV (test code = MCV) 91.4         80.0-94.0                 



[facility] CenterNgadfqgJZIBRYEATC3763-64-39 11:43:00





             Test Item    Value        Reference Range Interpretation Comments

 

             MCH (test code = MCH) 29.9 pg      27.0-31.0                 



[facility] CenterNhbihidZKXLXXQYPE5224-03-65 11:43:00





             Test Item    Value        Reference Range Interpretation Comments

 

             MCHC (test code = MCHC) 32.7         32.0-36.0                 



[facility] CenterBxyaqnfQHBGYPFKSI4924-79-62 11:43:00





             Test Item    Value        Reference Range Interpretation Comments

 

             RDW (test code = RDW) 15.7         11.5-14.5                 



[place] 732852-04-11 11:43:00





             Test Item    Value        Reference Range Interpretation Comments

 

             Platelet (test code = Platelet) 321          133-450               

    



[facility] CenterGgjcresRUCPUROAXJ3707-69-85 11:43:00





             Test Item    Value        Reference Range Interpretation Comments

 

             MPV (test code = MPV) 8.6          7.4-10.4                  



[facility] CenterCvrvehaFAMCDRSEXH7621-17-48 11:43:00





             Test Item    Value        Reference Range Interpretation Comments

 

             Segs (test code = Segs) 92.0         45.0-75.0                 



[facility] CenterAoeugzeZWKNXSYVMN5059-25-92 11:43:00





             Test Item    Value        Reference Range Interpretation Comments

 

             Lymphocytes (test code = Lymphocytes) 5.2          20.0-40.0       

          



[facility] CenterOjziqhxTWUWMAPIAY2782-24-41 11:43:00





             Test Item    Value        Reference Range Interpretation Comments

 

             Monocytes (test code = Monocytes) 1.6          2.0-12.0            

      



[place] 732852-04-11 11:43:00





             Test Item    Value        Reference Range Interpretation Comments

 

             Basophils (test code = 1.2          See_Comment                [Aut

omated message] The



             Basophils)                                          system which ge

nerated



                                                                 this result tra

nsmitted



                                                                 reference range

: <=1.0.



                                                                 The reference r

zhen was



                                                                 not used to int

erpret



                                                                 this result as



                                                                 normal/abnormal

.



[facility] CenterBoqyhldXRYQHDRFAV7012-20-66 11:43:00





             Test Item    Value        Reference Range Interpretation Comments

 

             Neutrophils # (test code = Neutrophils 9.4          1.5-8.1        

           



             #)                                                  



[facility] CenterCietzaiEOEUDHVCSP9785-42-51 11:43:00





             Test Item    Value        Reference Range Interpretation Comments

 

             Lymphocytes # (test code = Lymphocytes 0.5          1.0-5.5        

           



             #)                                                  



[facility] CenterYfxxgpiIPJPVVZYQC5302-17-44 11:43:00





             Test Item    Value        Reference Range Interpretation Comments

 

             Monocytes # (test code 0.2          See_Comment                [Aut

omated message] The



             = Monocytes #)                                        system which 

generated



                                                                 this result tra

nsmitted



                                                                 reference range

: <=0.8.



                                                                 The reference r

zhen was



                                                                 not used to int

erpret



                                                                 this result as



                                                                 normal/abnormal

.



[facility] CenterGbbatkfYTFZPYPLKP5525-98-49 11:43:00





             Test Item    Value        Reference Range Interpretation Comments

 

             Basophils # (test code 0.1          See_Comment                [Aut

omated message] The



             = Basophils #)                                        system which 

generated



                                                                 this result tra

nsmitted



                                                                 reference range

: <=0.2.



                                                                 The reference r

zhen was



                                                                 not used to int

erpret



                                                                 this result as



                                                                 normal/abnormal

.



[id number]





             Test Item    Value        Reference Range Interpretation Comments

 

             Coronavirus (COVID-19) Not Detected (22                        

   



             ZEYAD (test code = 4:18 AM)                               



             Coronavirus (COVID-19)                                        



             ZEYAD)                                                



[facility] [id number] 09:18:00





             Test Item    Value        Reference Range Interpretation Comments

 

             Coronavirus (COVID-19) Not Detected (22                        

   



             ZEYAD (test code = 4:18 AM)                               



             Coronavirus (COVID-19)                                        



             ZEYAD)                                                



[facility] C[id number] 09:18:00





             Test Item    Value        Reference Range Interpretation Comments

 

             Coronavirus (COVID-19) Not Detected (22                        

   



             ZEYAD (test code = 4:18 AM)                               



             Coronavirus (COVID-19)                                        



             ZEYAD)                                                



[facility] CenterTyxewnlQPRJUBCAFR4115-15-04 09:18:00





             Test Item    Value        Reference Range Interpretation Comments

 

             Coronavirus (COVID-19) Not Detected (22                        

   



             ZEYAD (test code = 4:18 AM)                               



             Coronavirus (COVID-19)                                        



             ZEYAD)                                                



[facility] [id number] 09:18:00





             Test Item    Value        Reference Range Interpretation Comments

 

             Coronavirus (COVID-19) Not Detected (22                        

   



             ZEYAD (test code = 4:18 AM)                               



             Coronavirus (COVID-19)                                        



             ZEYAD)                                                



[facility] [id number] 09:18:00





             Test Item    Value        Reference Range Interpretation Comments

 

             Coronavirus (COVID-19) Not Detected (22                        

   



             ZEYAD (test code = 4:18 AM)                               



             Coronavirus (COVID-19)                                        



             ZEYAD)                                                



[facility] [id number] 09:18:00





             Test Item    Value        Reference Range Interpretation Comments

 

             Coronavirus (COVID-19) Not Detected (22                        

   



             ZEYAD (test code = 4:18 AM)                               



             Coronavirus (COVID-19)                                        



             ZEYAD)                                                



[facility] [id number] [facility]:18:00





             Test Item    Value        Reference Range Interpretation Comments

 

             Coronavirus (COVID-19) Not Detected (22                        

   



             ZEYAD (test code = 4:18 AM)                               



             Coronavirus (COVID-19)                                        



             ZEYAD)                                                



[facility] [id number] 08:19:00





             Test Item    Value        Reference Range Interpretation Comments

 

             UA Sq Epi (test code = UA Sq Epi) None Seen                        

      



[facility] [id number] 08:19:00





             Test Item    Value        Reference Range Interpretation Comments

 

             Culture: Urine (test Holding For Better                           



             code = Culture: Urine) Growth                                 



[facility]ATE:ORDQN:[id number] 08:19:00





             Test Item    Value        Reference Range Interpretation Comments

 

             Culture: Urine (test >100,000 CFU/mL                           



             code = Culture: Providencia stuartii                           



             Urine)                                              



[facility] HospitalannCEFTRIAXONE:SUSC:PT:ISOLATE:ORDQN:[id number] 08:19:00





             Test Item    Value        Reference Range Interpretation Comments

 

             Providencia stuartii Providencia stuartii                          

 



             (test code = Providencia                                        



             stuartii)                                           



[facility] [id number] 08:19:00





             Test Item    Value        Reference Range Interpretation Comments

 

             UA Color (test code = Yellow *NA*(22 3:19                      

     



             UA Color)    AM)                                    



[facility] [id number] 08:19:00





             Test Item    Value        Reference Range Interpretation Comments

 

             UA Turbidity (test code Marked *ABN*(22                        

   



             = UA Turbidity) 3:19 AM)                               



[facility] [id number] 08:19:00





             Test Item    Value        Reference Range Interpretation Comments

 

             UA Spec Grav (test code = UA Spec 1.013 1                          

      



             Grav)                                               



[facility] [id number] 08:19:00





             Test Item    Value        Reference Range Interpretation Comments

 

             UA pH (test code = UA pH) 5.0 1        5.0-8.0                   



[facility] AND [id number] 08:19:00





             Test Item    Value        Reference Range Interpretation Comments

 

             UA Protein (test code = UA Negative mg/dL                          

 



             Protein)                                            



[facility] AND [id number] 08:19:00





             Test Item    Value        Reference Range Interpretation Comments

 

             UA Glucose (test code = UA Negative mg/dL                          

 



             Glucose)                                            



[facility] AND [id number] 08:19:00





             Test Item    Value        Reference Range Interpretation Comments

 

             UA Ketones (test code = UA Negative mg/dL                          

 



             Ketones)                                            



[facility] AND [id number] 08:19:00





             Test Item    Value        Reference Range Interpretation Comments

 

             UA Bili (test code = Negative *NA*(22                          

 



             UA Bili)     3:19 AM)                               



[facility] AND [id number] 08:19:00





             Test Item    Value        Reference Range Interpretation Comments

 

             UA Blood (test code = Small *ABN*(22                           



             UA Blood)    3:19 AM)                               



[facility] AND [id number] 08:19:00





             Test Item    Value        Reference Range Interpretation Comments

 

             UA Urobilinogen (test code = UA no gt        0.1-1.0               

    



             Urobilinogen)                                        



[facility] AND [id number] 08:19:00





             Test Item    Value        Reference Range Interpretation Comments

 

             UA Nitrite (test code Positive *ABN*(22                        

   



             = UA Nitrite) 3:19 AM)                               



[facility] AND [id number] 08:19:00





             Test Item    Value        Reference Range Interpretation Comments

 

             UA Leuk Est (test code Large *ABN*(22 3:19                     

      



             = UA Leuk Est) AM)                                    



[facility] AND [id number] 08:19:00





             Test Item    Value        Reference Range Interpretation Comments

 

             UA WBC (test code = no gt        See_Comment                [Automa

huma message] The



             UA WBC)                                             system which ge

nerated this



                                                                 result transmit

huma



                                                                 reference range

: <=5. The



                                                                 reference range

 was not



                                                                 used to interpr

et this



                                                                 result as zayda

l/abnormal.



[facility] AND [id number] 08:19:00





             Test Item    Value        Reference Range Interpretation Comments

 

             UA RBC (test code = 8            See_Comment                [Automa

huma message] The



             UA RBC)                                             system which ge

nerated this



                                                                 result transmit

huma



                                                                 reference range

: <=2. The



                                                                 reference range

 was not



                                                                 used to interpr

et this



                                                                 result as zayda

l/abnormal.



[facility] [id number] 08:19:00





             Test Item    Value        Reference Range Interpretation Comments

 

             UA Bacteria (test code = UA Occasional /HPF                        

   



             Bacteria)                                           



[facility] [id number] 08:19:00





             Test Item    Value        Reference Range Interpretation Comments

 

             UA Mucus (test code = UA Mucus) Few /LPF                           

    



[facility] [id number] 08:19:00





             Test Item    Value        Reference Range Interpretation Comments

 

             UA Amorph Ivonne (test code = Occasional /HPF                        

   



             UA Amorph Ivonne)                                        



[facility] [id number] 08:19:00





             Test Item    Value        Reference Range Interpretation Comments

 

             UA Sq Epi (test code = UA Sq Epi) None Seen                        

      



[facility]: [id number] 08:19:00





             Test Item    Value        Reference Range Interpretation Comments

 

             Culture: Urine (test Holding For Better                           



             code = Culture: Urine) Growth                                 



[facility]ParisRIAXONE:SUSC:PT:ISOLATE:ORDQN:[id number] 08:19:00





             Test Item    Value        Reference Range Interpretation Comments

 

             Culture: Urine (test >100,000 CFU/mL                           



             code = Culture: Providencia stuartii                           



             Urine)                                              



[facility] HospitalJohanaFTRIAXONE:SUSC:PT:ISOLATE:ORDQN:[id number] 08:19:00





             Test Item    Value        Reference Range Interpretation Comments

 

             Providencia stuartii Providencia stuartii                          

 



             (test code = Providencia                                        



             stuartii)                                           



[facility] AND [id number] 08:19:00





             Test Item    Value        Reference Range Interpretation Comments

 

             UA Color (test code = Yellow *NA*(22 3:19                      

     



             UA Color)    AM)                                    



[facility] AND [id number] 08:19:00





             Test Item    Value        Reference Range Interpretation Comments

 

             UA Turbidity (test code Marked *ABN*(22                        

   



             = UA Turbidity) 3:19 AM)                               



[facility] [id number] 08:19:00





             Test Item    Value        Reference Range Interpretation Comments

 

             UA Spec Grav (test code = UA Spec 1.013 1                          

      



             Grav)                                               



[facility] [id number] 08:19:00





             Test Item    Value        Reference Range Interpretation Comments

 

             UA pH (test code = UA pH) 5.0 1        5.0-8.0                   



[facility] AND [id number] 08:19:00





             Test Item    Value        Reference Range Interpretation Comments

 

             UA Protein (test code = UA Negative mg/dL                          

 



             Protein)                                            



[facility] AND [id number] 08:19:00





             Test Item    Value        Reference Range Interpretation Comments

 

             UA Glucose (test code = UA Negative mg/dL                          

 



             Glucose)                                            



[facility] AND [id number] 08:19:00





             Test Item    Value        Reference Range Interpretation Comments

 

             UA Ketones (test code = UA Negative mg/dL                          

 



             Ketones)                                            



[facility] AND [id number] 08:19:00





             Test Item    Value        Reference Range Interpretation Comments

 

             UA Bili (test code = Negative *NA*(22                          

 



             UA Bili)     3:19 AM)                               



[facility] AND [id number] 08:19:00





             Test Item    Value        Reference Range Interpretation Comments

 

             UA Blood (test code = Small *ABN*(22                           



             UA Blood)    3:19 AM)                               



[facility] AND [id number] 08:19:00





             Test Item    Value        Reference Range Interpretation Comments

 

             UA Urobilinogen (test code = UA no gt        0.1-1.0               

    



             Urobilinogen)                                        



[facility] AND [id number] 08:19:00





             Test Item    Value        Reference Range Interpretation Comments

 

             UA Nitrite (test code Positive *ABN*(22                        

   



             = UA Nitrite) 3:19 AM)                               



[facility] AND [id number] 08:19:00





             Test Item    Value        Reference Range Interpretation Comments

 

             UA Leuk Est (test code Large *ABN*(22 3:19                     

      



             = UA Leuk Est) AM)                                    



[facility] AND [id number] 08:19:00





             Test Item    Value        Reference Range Interpretation Comments

 

             UA WBC (test code = no gt        See_Comment                [Automa

huma message] The



             UA WBC)                                             system which ge

nerated this



                                                                 result transmit

huma



                                                                 reference range

: <=5. The



                                                                 reference range

 was not



                                                                 used to interpr

et this



                                                                 result as zayda

l/abnormal.



[facility] AND [id number] 08:19:00





             Test Item    Value        Reference Range Interpretation Comments

 

             UA RBC (test code = 8            See_Comment                [Automa

huma message] The



             UA RBC)                                             system which ge

nerated this



                                                                 result transmit

huma



                                                                 reference range

: <=2. The



                                                                 reference range

 was not



                                                                 used to interpr

et this



                                                                 result as zayda

l/abnormal.



[facility] [id number] 08:19:00





             Test Item    Value        Reference Range Interpretation Comments

 

             UA Bacteria (test code = UA Occasional /HPF                        

   



             Bacteria)                                           



[facility] [id number] 08:19:00





             Test Item    Value        Reference Range Interpretation Comments

 

             UA Mucus (test code = UA Mucus) Few /LPF                           

    



[facility] [id number] 08:19:00





             Test Item    Value        Reference Range Interpretation Comments

 

             UA Amorph Ivonne (test code = Occasional /HPF                        

   



             UA Amorph Ivonne)                                        



[facility] [id number] 08:19:00





             Test Item    Value        Reference Range Interpretation Comments

 

             UA Sq Epi (test code = UA Sq Epi) None Seen                        

      



[facility] [id number] 08:19:00





             Test Item    Value        Reference Range Interpretation Comments

 

             Culture: Urine (test Holding For Better                           



             code = Culture: Urine) Growth                                 



[facility]riqueFTRIAXONE:SUSC:PT:ISOLATE:ORDQN:[id number] 08:19:00





             Test Item    Value        Reference Range Interpretation Comments

 

             Culture: Urine (test >100,000 CFU/mL                           



             code = Culture: Providencia stuartii                           



             Urine)                                              



[facility] JoseCEFTRIAXONE:SUSC:PT:ISOLATE:ORDQN:[id number] 08:19:00





             Test Item    Value        Reference Range Interpretation Comments

 

             Providencia stuartii Providencia stuartii                          

 



             (test code = Providencia                                        



             stuartii)                                           



[facility] [id number] 08:19:00





             Test Item    Value        Reference Range Interpretation Comments

 

             UA Color (test code = Yellow *NA*(22 3:19                      

     



             UA Color)    AM)                                    



[facility] [id number] 08:19:00





             Test Item    Value        Reference Range Interpretation Comments

 

             UA Turbidity (test code Marked *ABN*(22                        

   



             = UA Turbidity) 3:19 AM)                               



[facility] [id number] 08:19:00





             Test Item    Value        Reference Range Interpretation Comments

 

             UA Spec Grav (test code = UA Spec 1.013 1                          

      



             Grav)                                               



[facility] [id number] 08:19:00





             Test Item    Value        Reference Range Interpretation Comments

 

             UA pH (test code = UA pH) 5.0 1        5.0-8.0                   



[facility] AND [id number] 08:19:00





             Test Item    Value        Reference Range Interpretation Comments

 

             UA Protein (test code = UA Negative mg/dL                          

 



             Protein)                                            



[facility] AND [id number] 08:19:00





             Test Item    Value        Reference Range Interpretation Comments

 

             UA Glucose (test code = UA Negative mg/dL                          

 



             Glucose)                                            



[facility] AND [id number] 08:19:00





             Test Item    Value        Reference Range Interpretation Comments

 

             UA Ketones (test code = UA Negative mg/dL                          

 



             Ketones)                                            



[facility] AND [id number] 08:19:00





             Test Item    Value        Reference Range Interpretation Comments

 

             UA Bili (test code = Negative *NA*(22                          

 



             UA Bili)     3:19 AM)                               



[facility] AND [id number] 08:19:00





             Test Item    Value        Reference Range Interpretation Comments

 

             UA Blood (test code = Small *ABN*(22                           



             UA Blood)    3:19 AM)                               



[facility] AND [id number] 08:19:00





             Test Item    Value        Reference Range Interpretation Comments

 

             UA Urobilinogen (test code = UA no gt        0.1-1.0               

    



             Urobilinogen)                                        



[facility] AND [id number] 08:19:00





             Test Item    Value        Reference Range Interpretation Comments

 

             UA Nitrite (test code Positive *ABN*(22                        

   



             = UA Nitrite) 3:19 AM)                               



[facility] AND [id number] 08:19:00





             Test Item    Value        Reference Range Interpretation Comments

 

             UA Leuk Est (test code Large *ABN*(22 3:19                     

      



             = UA Leuk Est) AM)                                    



[facility] AND [id number] 08:19:00





             Test Item    Value        Reference Range Interpretation Comments

 

             UA WBC (test code = no gt        See_Comment                [Automa

huma message] The



             UA WBC)                                             system which ge

nerated this



                                                                 result transmit

huma



                                                                 reference range

: <=5. The



                                                                 reference range

 was not



                                                                 used to interpr

et this



                                                                 result as zayda

l/abnormal.



[facility] AND [id number] 08:19:00





             Test Item    Value        Reference Range Interpretation Comments

 

             UA RBC (test code = 8            See_Comment                [Automa

huma message] The



             UA RBC)                                             system which ge

nerated this



                                                                 result transmit

huma



                                                                 reference range

: <=2. The



                                                                 reference range

 was not



                                                                 used to interpr

et this



                                                                 result as zayda

l/abnormal.



[facility] [id number] 08:19:00





             Test Item    Value        Reference Range Interpretation Comments

 

             UA Bacteria (test code = UA Occasional /HPF                        

   



             Bacteria)                                           



[facility] AND [id number] 08:19:00





             Test Item    Value        Reference Range Interpretation Comments

 

             UA Mucus (test code = UA Mucus) Few /LPF                           

    



[facility] AND [id number] 08:19:00





             Test Item    Value        Reference Range Interpretation Comments

 

             UA Amorph Ivonne (test code = Occasional /HPF                        

   



             UA Amorph Ivonne)                                        



[facility] [id number] 08:19:00





             Test Item    Value        Reference Range Interpretation Comments

 

             UA Sq Epi (test code = UA Sq Epi) None Seen                        

      



[facility] [id number] 08:19:00





             Test Item    Value        Reference Range Interpretation Comments

 

             Culture: Urine (test Holding For Better                           



             code = Culture: Urine) Growth                                 



[facility]:ORDQN:[id number] 08:19:00





             Test Item    Value        Reference Range Interpretation Comments

 

             Culture: Urine (test >100,000 CFU/mL                           



             code = Culture: Providencia stuartii                           



             Urine)                                              



[facility]:ISOLATE:ORDQN:[id number] 08:19:00





             Test Item    Value        Reference Range Interpretation Comments

 

             Providencia stuartii Providencia stuartii                          

 



             (test code = Providencia                                        



             stuartii)                                           



[facility] [id number] 08:19:00





             Test Item    Value        Reference Range Interpretation Comments

 

             UA Color (test code = Yellow *NA*(22 3:19                      

     



             UA Color)    AM)                                    



[facility] [id number] 08:19:00





             Test Item    Value        Reference Range Interpretation Comments

 

             UA Turbidity (test code Marked *ABN*(22                        

   



             = UA Turbidity) 3:19 AM)                               



[facility] [id number] 08:19:00





             Test Item    Value        Reference Range Interpretation Comments

 

             UA Spec Grav (test code = UA Spec 1.013 1                          

      



             Grav)                                               



[facility] AND [id number] 08:19:00





             Test Item    Value        Reference Range Interpretation Comments

 

             UA pH (test code = UA pH) 5.0 1        5.0-8.0                   



[facility] AND [id number] 08:19:00





             Test Item    Value        Reference Range Interpretation Comments

 

             UA Protein (test code = UA Negative mg/dL                          

 



             Protein)                                            



[facility] AND [id number] 08:19:00





             Test Item    Value        Reference Range Interpretation Comments

 

             UA Glucose (test code = UA Negative mg/dL                          

 



             Glucose)                                            



[facility] AND [id number] 08:19:00





             Test Item    Value        Reference Range Interpretation Comments

 

             UA Ketones (test code = UA Negative mg/dL                          

 



             Ketones)                                            



[facility] AND [id number] 08:19:00





             Test Item    Value        Reference Range Interpretation Comments

 

             UA Bili (test code = Negative *NA*(22                          

 



             UA Bili)     3:19 AM)                               



[facility] AND [id number] 08:19:00





             Test Item    Value        Reference Range Interpretation Comments

 

             UA Blood (test code = Small *ABN*(22                           



             UA Blood)    3:19 AM)                               



[facility] AND [id number] 08:19:00





             Test Item    Value        Reference Range Interpretation Comments

 

             UA Urobilinogen (test code = UA no gt        0.1-1.0               

    



             Urobilinogen)                                        



[facility] AND [id number] 08:19:00





             Test Item    Value        Reference Range Interpretation Comments

 

             UA Nitrite (test code Positive *ABN*(22                        

   



             = UA Nitrite) 3:19 AM)                               



[facility] AND [id number] 08:19:00





             Test Item    Value        Reference Range Interpretation Comments

 

             UA Leuk Est (test code Large *ABN*(22 3:19                     

      



             = UA Leuk Est) AM)                                    



[facility] AND [id number] 08:19:00





             Test Item    Value        Reference Range Interpretation Comments

 

             UA WBC (test code = no gt        See_Comment                [Automa

huma message] The



             UA WBC)                                             system which ge

nerated this



                                                                 result transmit

huma



                                                                 reference range

: <=5. The



                                                                 reference range

 was not



                                                                 used to interpr

et this



                                                                 result as zayda

l/abnormal.



[facility] AND [id number] 08:19:00





             Test Item    Value        Reference Range Interpretation Comments

 

             UA RBC (test code = 8            See_Comment                [Automa

huma message] The



             UA RBC)                                             system which ge

nerated this



                                                                 result transmit

huma



                                                                 reference range

: <=2. The



                                                                 reference range

 was not



                                                                 used to interpr

et this



                                                                 result as zayda

l/abnormal.



[facility] [id number] 08:19:00





             Test Item    Value        Reference Range Interpretation Comments

 

             UA Bacteria (test code = UA Occasional /HPF                        

   



             Bacteria)                                           



[facility] AND [id number] 08:19:00





             Test Item    Value        Reference Range Interpretation Comments

 

             UA Mucus (test code = UA Mucus) Few /LPF                           

    



[facility] AND [id number] 08:19:00





             Test Item    Value        Reference Range Interpretation Comments

 

             UA Amorph Ivonne (test code = Occasional /HPF                        

   



             UA Amorph Ivonne)                                        



[facility] AND [id number] 08:19:00





             Test Item    Value        Reference Range Interpretation Comments

 

             UA Sq Epi (test code = UA Sq Epi) None Seen                        

      



[facility] [id number] 08:19:00





             Test Item    Value        Reference Range Interpretation Comments

 

             Culture: Urine (test Holding For Better                           



             code = Culture: Urine) Growth                                 



[facility]OLATE:ORDQN:[id number] 08:19:00





             Test Item    Value        Reference Range Interpretation Comments

 

             Culture: Urine (test >100,000 CFU/mL                           



             code = Culture: Providencia stuartii                           



             Urine)                                              



[facility] HospitalnguyenCEFTRIAXONE:SUSC:PT:ISOLATE:ORDQN:[id number] 08:19:00





             Test Item    Value        Reference Range Interpretation Comments

 

             Providencia stuartii Providencia stuartii                          

 



             (test code = Providencia                                        



             stuartii)                                           



[facility] AND [id number] 08:19:00





             Test Item    Value        Reference Range Interpretation Comments

 

             UA Color (test code = Yellow *NA*(22 3:19                      

     



             UA Color)    AM)                                    



[facility] [id number] 08:19:00





             Test Item    Value        Reference Range Interpretation Comments

 

             UA Turbidity (test code Marked *ABN*(22                        

   



             = UA Turbidity) 3:19 AM)                               



[facility] [id number] 08:19:00





             Test Item    Value        Reference Range Interpretation Comments

 

             UA Spec Grav (test code = UA Spec 1.013 1                          

      



             Grav)                                               



[facility] AND [id number] 08:19:00





             Test Item    Value        Reference Range Interpretation Comments

 

             UA pH (test code = UA pH) 5.0 1        5.0-8.0                   



[facility] AND [id number] 08:19:00





             Test Item    Value        Reference Range Interpretation Comments

 

             UA Protein (test code = UA Negative mg/dL                          

 



             Protein)                                            



[facility] AND [id number] 08:19:00





             Test Item    Value        Reference Range Interpretation Comments

 

             UA Glucose (test code = UA Negative mg/dL                          

 



             Glucose)                                            



[facility] AND [id number] 08:19:00





             Test Item    Value        Reference Range Interpretation Comments

 

             UA Ketones (test code = UA Negative mg/dL                          

 



             Ketones)                                            



[facility] AND [id number] 08:19:00





             Test Item    Value        Reference Range Interpretation Comments

 

             UA Bili (test code = Negative *NA*(22                          

 



             UA Bili)     3:19 AM)                               



[facility] AND [id number] 08:19:00





             Test Item    Value        Reference Range Interpretation Comments

 

             UA Blood (test code = Small *ABN*(22                           



             UA Blood)    3:19 AM)                               



[facility] AND [id number] 08:19:00





             Test Item    Value        Reference Range Interpretation Comments

 

             UA Urobilinogen (test code = UA no gt        0.1-1.0               

    



             Urobilinogen)                                        



[facility] AND [id number] 08:19:00





             Test Item    Value        Reference Range Interpretation Comments

 

             UA Nitrite (test code Positive *ABN*(22                        

   



             = UA Nitrite) 3:19 AM)                               



[facility] AND [id number] 08:19:00





             Test Item    Value        Reference Range Interpretation Comments

 

             UA Leuk Est (test code Large *ABN*(22 3:19                     

      



             = UA Leuk Est) AM)                                    



[facility] AND [id number] 08:19:00





             Test Item    Value        Reference Range Interpretation Comments

 

             UA WBC (test code = no gt        See_Comment                [Automa

huma message] The



             UA WBC)                                             system which ge

nerated this



                                                                 result transmit

huma



                                                                 reference range

: <=5. The



                                                                 reference range

 was not



                                                                 used to interpr

et this



                                                                 result as zayda

l/abnormal.



[facility] AND [id number] 08:19:00





             Test Item    Value        Reference Range Interpretation Comments

 

             UA RBC (test code = 8            See_Comment                [Automa

huma message] The



             UA RBC)                                             system which ge

nerated this



                                                                 result transmit

huma



                                                                 reference range

: <=2. The



                                                                 reference range

 was not



                                                                 used to interpr

et this



                                                                 result as zayda

l/abnormal.



[facility] AND [id number] 08:19:00





             Test Item    Value        Reference Range Interpretation Comments

 

             UA Bacteria (test code = UA Occasional /HPF                        

   



             Bacteria)                                           



[facility] [id number] 08:19:00





             Test Item    Value        Reference Range Interpretation Comments

 

             UA Mucus (test code = UA Mucus) Few /LPF                           

    



[facility] AND [id number] 08:19:00





             Test Item    Value        Reference Range Interpretation Comments

 

             UA Amorph Ivonne (test code = Occasional /HPF                        

   



             UA Amorph Ivonne)                                        



[facility] [id number] 08:19:00





             Test Item    Value        Reference Range Interpretation Comments

 

             UA Sq Epi (test code = UA Sq Epi) None Seen                        

      



[facility] [id number] 08:19:00





             Test Item    Value        Reference Range Interpretation Comments

 

             Culture: Urine (test Holding For Better                           



             code = Culture: Urine) Growth                                 



[facility] HospitalJohanaFTRIAXONE:SUSC:PT:ISOLATE:ORDQN:[id number] 08:19:00





             Test Item    Value        Reference Range Interpretation Comments

 

             Culture: Urine (test >100,000 CFU/mL                           



             code = Culture: Providencia stuartii                           



             Urine)                                              



[facility]CEF[facility]XONE:[facility]USC:PT:ISOLATE:ORDQN:[id number] 08:19:00





             Test Item    Value        Reference Range Interpretation Comments

 

             Culture: Urine (test >100,000 CFU/mL                           



             code = Culture: Providencia stuartii                           



             Urine)                                              



[facility]C:PT:ISOLATE:ORDQN:[id number] 08:19:00





             Test Item    Value        Reference Range Interpretation Comments

 

             Providencia stuartii Providencia stuartii                          

 



             (test code = Providencia                                        



             stuartii)                                           



[facility] AND [id number] 08:19:00





             Test Item    Value        Reference Range Interpretation Comments

 

             UA Color (test code = Yellow *NA*(4/9/22 3:19                      

     



             UA Color)    AM)                                    



[facility] AND [id number] 08:19:00





             Test Item    Value        Reference Range Interpretation Comments

 

             UA Turbidity (test code Marked *ABN*(22                        

   



             = UA Turbidity) 3:19 AM)                               



[facility] AND [id number] 08:19:00





             Test Item    Value        Reference Range Interpretation Comments

 

             UA Spec Grav (test code = UA Spec 1.013 1                          

      



             Grav)                                               



[facility]PT:ISOLATE:ORDQN:[id number] 08:19:00





             Test Item    Value        Reference Range Interpretation Comments

 

             Providencia stuartii Providencia stuartii                          

 



             (test code = Providencia                                        



             stuartii)                                           



[facility] AND [id number] 08:19:00





             Test Item    Value        Reference Range Interpretation Comments

 

             UA pH (test code = UA pH) 5.0 1        5.0-8.0                   



[facility] AND [id number] 08:19:00





             Test Item    Value        Reference Range Interpretation Comments

 

             UA Protein (test code = UA Negative mg/dL                          

 



             Protein)                                            



[facility] AND [id number] 08:19:00





             Test Item    Value        Reference Range Interpretation Comments

 

             UA Glucose (test code = UA Negative mg/dL                          

 



             Glucose)                                            



[facility] AND [id number] 08:19:00





             Test Item    Value        Reference Range Interpretation Comments

 

             UA Ketones (test code = UA Negative mg/dL                          

 



             Ketones)                                            



[facility] AND [id number] 08:19:00





             Test Item    Value        Reference Range Interpretation Comments

 

             UA Bili (test code = Negative *NA*(22                          

 



             UA Bili)     3:19 AM)                               



[facility] AND [id number] 08:19:00





             Test Item    Value        Reference Range Interpretation Comments

 

             UA Blood (test code = Small *ABN*(22                           



             UA Blood)    3:19 AM)                               



[facility] AND [id number] 08:19:00





             Test Item    Value        Reference Range Interpretation Comments

 

             UA Urobilinogen (test code = UA no gt        0.1-1.0               

    



             Urobilinogen)                                        



[facility] AND [id number] 08:19:00





             Test Item    Value        Reference Range Interpretation Comments

 

             UA Nitrite (test code Positive *ABN*(22                        

   



             = UA Nitrite) 3:19 AM)                               



[facility] [id number] 08:19:00





             Test Item    Value        Reference Range Interpretation Comments

 

             UA Leuk Est (test code Large *ABN*(22 3:19                     

      



             = UA Leuk Est) AM)                                    



[facility] [id number] 08:19:00





             Test Item    Value        Reference Range Interpretation Comments

 

             UA WBC (test code = no gt        See_Comment                [Automa

hmua message] The



             UA WBC)                                             system which ge

nerated this



                                                                 result transmit

huma



                                                                 reference range

: <=5. The



                                                                 reference range

 was not



                                                                 used to interpr

et this



                                                                 result as zayda

l/abnormal.



[facility] [id number] 08:19:00





             Test Item    Value        Reference Range Interpretation Comments

 

             UA Color (test code = Yellow *NA*(22 3:19                      

     



             UA Color)    AM)                                    



[facility] [id number] 08:19:00





             Test Item    Value        Reference Range Interpretation Comments

 

             UA RBC (test code = 8            See_Comment                [Automa

huma message] The



             UA RBC)                                             system which ge

nerated this



                                                                 result transmit

huma



                                                                 reference range

: <=2. The



                                                                 reference range

 was not



                                                                 used to interpr

et this



                                                                 result as zayda

l/abnormal.



[facility] [id number] [facility]00





             Test Item    Value        Reference Range Interpretation Comments

 

             UA Bacteria (test code = UA Occasional /HPF                        

   



             Bacteria)                                           



[facility] [id number] 08:19:00





             Test Item    Value        Reference Range Interpretation Comments

 

             UA Mucus (test code = UA Mucus) Few /LPF                           

    



[facility] [id number] 08:19:00





             Test Item    Value        Reference Range Interpretation Comments

 

             UA Amorph Ivonne (test code = Occasional /HPF                        

   



             UA Amorph Ivonne)                                        



[facility] [id number] 08:19:00





             Test Item    Value        Reference Range Interpretation Comments

 

             UA Sq Epi (test code = UA Sq Epi) None Seen                        

      



[facility] [id number] [facility]





             Test Item    Value        Reference Range Interpretation Comments

 

             Culture: Urine (test Holding For Better                           



             code = Culture: Urine) Growth                                 



[facility] [id number] 08:19:00





             Test Item    Value        Reference Range Interpretation Comments

 

             UA Turbidity (test code Marked *ABN*(22                        

   



             = UA Turbidity) 3:19 AM)                               



[facility] AND [id number] 08:19:00





             Test Item    Value        Reference Range Interpretation Comments

 

             UA Spec Grav (test code = UA Spec 1.013 1                          

      



             Grav)                                               



[facility] AND [id number] 08:19:00





             Test Item    Value        Reference Range Interpretation Comments

 

             UA pH (test code = UA pH) 5.0 1        5.0-8.0                   



[facility] AND [id number] 08:19:00





             Test Item    Value        Reference Range Interpretation Comments

 

             UA Protein (test code = UA Negative mg/dL                          

 



             Protein)                                            



[facility] AND [id number] 08:19:00





             Test Item    Value        Reference Range Interpretation Comments

 

             UA Glucose (test code = UA Negative mg/dL                          

 



             Glucose)                                            



[facility] AND [id number] 08:19:00





             Test Item    Value        Reference Range Interpretation Comments

 

             UA Ketones (test code = UA Negative mg/dL                          

 



             Ketones)                                            



[facility] AND [id number] 08:19:00





             Test Item    Value        Reference Range Interpretation Comments

 

             UA Bili (test code = Negative *NA*(22                          

 



             UA Bili)     3:19 AM)                               



[facility] AND [id number] 08:19:00





             Test Item    Value        Reference Range Interpretation Comments

 

             UA Blood (test code = Small *ABN*(22                           



             UA Blood)    3:19 AM)                               



[facility] AND [id number] 08:19:00





             Test Item    Value        Reference Range Interpretation Comments

 

             UA Urobilinogen (test code = UA no gt        0.1-1.0               

    



             Urobilinogen)                                        



[facility] AND [id number] 08:19:00





             Test Item    Value        Reference Range Interpretation Comments

 

             UA Nitrite (test code Positive *ABN*(22                        

   



             = UA Nitrite) 3:19 AM)                               



[facility] AND [id number] 08:19:00





             Test Item    Value        Reference Range Interpretation Comments

 

             UA Leuk Est (test code Large *ABN*(22 3:19                     

      



             = UA Leuk Est) AM)                                    



[facility] AND [id number] 08:19:00





             Test Item    Value        Reference Range Interpretation Comments

 

             UA WBC (test code = no gt        See_Comment                [Automa

huma message] The



             UA WBC)                                             system which ge

nerated this



                                                                 result transmit

huma



                                                                 reference range

: <=5. The



                                                                 reference range

 was not



                                                                 used to interpr

et this



                                                                 result as zayda

l/abnormal.



[facility] [id number] 08:19:00





             Test Item    Value        Reference Range Interpretation Comments

 

             UA RBC (test code = 8            See_Comment                [Automa

huma message] The



             UA RBC)                                             system which ge

nerated this



                                                                 result transmit

huma



                                                                 reference range

: <=2. The



                                                                 reference range

 was not



                                                                 used to interpr

et this



                                                                 result as zayda

l/abnormal.



[facility] [id number] 08:19:00





             Test Item    Value        Reference Range Interpretation Comments

 

             UA Bacteria (test code = UA Occasional /HPF                        

   



             Bacteria)                                           



[facility] [id number] 08:19:00





             Test Item    Value        Reference Range Interpretation Comments

 

             UA Mucus (test code = UA Mucus) Few /LPF                           

    



[facility] [id number] 08:19:00





             Test Item    Value        Reference Range Interpretation Comments

 

             UA Amorph Ivonne (test code = Occasional /HPF                        

   



             UA Amorph Ivonne)                                        



[facility] [id number] 08:19:00





             Test Item    Value        Reference Range Interpretation Comments

 

             UA Sq Epi (test code = UA Sq Epi) None Seen                        

      



[facility] [id number] 08:19:00





             Test Item    Value        Reference Range Interpretation Comments

 

             Culture: Urine (test Holding For Better                           



             code = Culture: Urine) Growth                                 



[facility]C:PT:ISOLATE:ORDQN:[id number] 08:19:00





             Test Item    Value        Reference Range Interpretation Comments

 

             Culture: Urine (test >100,000 CFU/mL                           



             code = Culture: Providencia stuartii                           



             Urine)                                              



[facility] SharmaineannCEFTRIAXONE:SUSC:PT:ISOLATE:ORDQN:[id number] 08:19:00





             Test Item    Value        Reference Range Interpretation Comments

 

             Providencia stuartii Providencia stuartii                          

 



             (test code = Providencia                                        



             stuartii)                                           



[facility] [id number] 08:19:00





             Test Item    Value        Reference Range Interpretation Comments

 

             UA Color (test code = Yellow *NA*(22 3:19                      

     



             UA Color)    AM)                                    



[facility] [id number] 08:19:00





             Test Item    Value        Reference Range Interpretation Comments

 

             UA Turbidity (test code Marked *ABN*(22                        

   



             = UA Turbidity) 3:19 AM)                               



[facility] AND [id number] 08:19:00





             Test Item    Value        Reference Range Interpretation Comments

 

             UA Spec Grav (test code = UA Spec 1.013 1                          

      



             Grav)                                               



[facility] AND [id number] 08:19:00





             Test Item    Value        Reference Range Interpretation Comments

 

             UA pH (test code = UA pH) 5.0 1        5.0-8.0                   



[facility] AND [id number] 08:19:00





             Test Item    Value        Reference Range Interpretation Comments

 

             UA Protein (test code = UA Negative mg/dL                          

 



             Protein)                                            



[facility] AND [id number] 08:19:00





             Test Item    Value        Reference Range Interpretation Comments

 

             UA Glucose (test code = UA Negative mg/dL                          

 



             Glucose)                                            



[facility] AND [id number] 08:19:00





             Test Item    Value        Reference Range Interpretation Comments

 

             UA Ketones (test code = UA Negative mg/dL                          

 



             Ketones)                                            



[facility] AND [id number] 08:19:00





             Test Item    Value        Reference Range Interpretation Comments

 

             UA Bili (test code = Negative *NA*(22                          

 



             UA Bili)     3:19 AM)                               



[facility] AND [id number] 08:19:00





             Test Item    Value        Reference Range Interpretation Comments

 

             UA Blood (test code = Small *ABN*(22                           



             UA Blood)    3:19 AM)                               



[facility] AND [id number] 08:19:00





             Test Item    Value        Reference Range Interpretation Comments

 

             UA Urobilinogen (test code = UA no gt        0.1-1.0               

    



             Urobilinogen)                                        



[facility] AND [id number] 08:19:00





             Test Item    Value        Reference Range Interpretation Comments

 

             UA Nitrite (test code Positive *ABN*(22                        

   



             = UA Nitrite) 3:19 AM)                               



[facility] AND [id number] 08:19:00





             Test Item    Value        Reference Range Interpretation Comments

 

             UA Leuk Est (test code Large *ABN*(22 3:19                     

      



             = UA Leuk Est) AM)                                    



[facility] AND [id number] 08:19:00





             Test Item    Value        Reference Range Interpretation Comments

 

             UA WBC (test code = no gt        See_Comment                [Automa

huma message] The



             UA WBC)                                             system which ge

nerated this



                                                                 result transmit

huma



                                                                 reference range

: <=5. The



                                                                 reference range

 was not



                                                                 used to interpr

et this



                                                                 result as zayda

l/abnormal.



[facility] AND [id number] 08:19:00





             Test Item    Value        Reference Range Interpretation Comments

 

             UA RBC (test code = 8            See_Comment                [Automa

huma message] The



             UA RBC)                                             system which ge

nerated this



                                                                 result transmit

huma



                                                                 reference range

: <=2. The



                                                                 reference range

 was not



                                                                 used to interpr

et this



                                                                 result as zayda

l/abnormal.



[facility] AND [id number] 08:19:00





             Test Item    Value        Reference Range Interpretation Comments

 

             UA Bacteria (test code = UA Occasional /HPF                        

   



             Bacteria)                                           



[facility] AND [id number] 08:19:00





             Test Item    Value        Reference Range Interpretation Comments

 

             UA Mucus (test code = UA Mucus) Few /LPF                           

    



[facility] AND [id number] 08:19:00





             Test Item    Value        Reference Range Interpretation Comments

 

             UA Amorph Ivonne (test code = Occasional /HPF                        

   



             UA Amorph Ivonne)                                        



[employer] [id number] 00:20:00





             Test Item    Value        Reference Range Interpretation Comments

 

             ABO/Rh (test code = ABO/Rh) AB POS                                 



[facility] [employer] [id number] 00:20:00





             Test Item    Value        Reference Range Interpretation Comments

 

             Antibody Scrn (test Negative (22 7:20                          

 



             code = Antibody Scrn) PM)                                    



[facility] [employer] [id number] 00:20:00





             Test Item    Value        Reference Range Interpretation Comments

 

             Glucose Lvl (test code = Glucose Lvl) 74           70-99           

          



[facility] [employer] [id number] 00:20:00





             Test Item    Value        Reference Range Interpretation Comments

 

             BUN (test code = BUN) 15           7-22                      



[employer] [id number] 00:20:00





             Test Item    Value        Reference Range Interpretation Comments

 

             Creatinine Lvl (test code = Creatinine 0.61         0.50-1.40      

           



             Lvl)                                                



[facility] [employer] [id number] 00:20:00





             Test Item    Value        Reference Range Interpretation Comments

 

             Sodium Lvl (test code = Sodium Lvl) 139          135-145           

        



[facility] [employer] [id number] 00:20:00





             Test Item    Value        Reference Range Interpretation Comments

 

             Potassium Lvl (test code = Potassium 4.9          3.5-5.1          

         



             Lvl)                                                



[place] [id number] 00:20:00





             Test Item    Value        Reference Range Interpretation Comments

 

             Chloride Lvl (test code = Chloride Lvl) 105                  

            



[place] 924252-04-09 00:20:00





             Test Item    Value        Reference Range Interpretation Comments

 

             CO2 (test code = CO2) 30           24-32                     



[place] 924252-04-09 00:20:00





             Test Item    Value        Reference Range Interpretation Comments

 

             Calcium Lvl (test code = Calcium Lvl) 9.5          8.5-10.5        

          



[place] 924252-04-09 00:20:00





             Test Item    Value        Reference Range Interpretation Comments

 

             AGAP (test code = AGAP) 8.9          10.0-20.0                 



[place] 924252-04-09 00:20:00





             Test Item    Value        Reference Range Interpretation Comments

 

             eGFR (test code = eGFR) 129                                    



[place] 924252-04-09 00:20:00





             Test Item    Value        Reference Range Interpretation Comments

 

             Lactic Acid Lvl (test code = Lactic 1.2          0.5-2.2           

        



             Acid Lvl)                                           



[facility]2022-04-09 00:20:00





             Test Item    Value        Reference Range Interpretation Comments

 

             Procalcitonin Lvl (test 0.09         See_Comment                [Au

tomated message]



             code = Procalcitonin Lvl)                                        

e system which



                                                                 generated this 

result



                                                                 transmitted ref

erence



                                                                 range: <=0.10. 

The



                                                                 reference range

 was not



                                                                 used to interpr

et this



                                                                 result as



                                                                 normal/abnormal

.



[place] 732852-04-09 00:20:00





             Test Item    Value        Reference Range Interpretation Comments

 

             WBC (test code = WBC) 10.1         3.7-10.4                  



[place] 94684-04-09 00:20:00





             Test Item    Value        Reference Range Interpretation Comments

 

             RBC (test code = RBC) 5.14         4.70-6.10                 



[place] 732852-04-09 00:20:00





             Test Item    Value        Reference Range Interpretation Comments

 

             Hgb (test code = Hgb) 15.5         14.0-18.0                 



[place] [address]-04-09 00:20:00





             Test Item    Value        Reference Range Interpretation Comments

 

             Hct (test code = Hct) 46.5         42.0-54.0                 



[id number]





             Test Item    Value        Reference Range Interpretation Comments

 

             MCV (test code = MCV) 90.5         80.0-94.0                 



[facility] [id number] 00:20:00





             Test Item    Value        Reference Range Interpretation Comments

 

             MCH (test code = MCH) 30.1 pg      27.0-31.0                 



[facility] [id number] 00:20:00





             Test Item    Value        Reference Range Interpretation Comments

 

             MCHC (test code = MCHC) 33.3         32.0-36.0                 



[facility] CenterVdkafvuUQHDTTCSRJ7676-97-11 00:20:00





             Test Item    Value        Reference Range Interpretation Comments

 

             RDW (test code = RDW) 15.7         11.5-14.5                 



[facility] [id number] 00:20:00





             Test Item    Value        Reference Range Interpretation Comments

 

             Platelet (test code = Platelet) 302          133-450               

    



[facility] C[id number] 00:20:00





             Test Item    Value        Reference Range Interpretation Comments

 

             MPV (test code = MPV) 8.9          7.4-10.4                  



[facility] [id number] 00:20:00





             Test Item    Value        Reference Range Interpretation Comments

 

             Sed Rate (test code = 17           See_Comment                [Auto

mated message] The



             Sed Rate)                                           system which ge

nerated this



                                                                 result transmit

huma



                                                                 reference range

: <=15. The



                                                                 reference range

 was not



                                                                 used to interpr

et this



                                                                 result as zayda

l/abnormal.



[facility] C[id number] 00:20:00





             Test Item    Value        Reference Range Interpretation Comments

 

             PT (test code = PT) 13.1 s       12.0-14.7                 



[place] 732852-04-09 00:20:00





             Test Item    Value        Reference Range Interpretation Comments

 

             INR (test code = INR) 1.00 1       0.85-1.17                 



[facility] [id number] 00:20:00





             Test Item    Value        Reference Range Interpretation Comments

 

             PTT (test code = PTT) 31.5 s       22.9-35.8                 



[place] 732852-04-09 00:20:00





             Test Item    Value        Reference Range Interpretation Comments

 

             Segs (test code = Segs) 68.7         45.0-75.0                 



[place] 732852-04-09 00:20:00





             Test Item    Value        Reference Range Interpretation Comments

 

             Lymphocytes (test code = Lymphocytes) 19.6         20.0-40.0       

          



[facility] [id number] 00:20:00





             Test Item    Value        Reference Range Interpretation Comments

 

             Monocytes (test code = Monocytes) 9.4          2.0-12.0            

      



[facility] CenterBafhmwaTFGQKVEABX8895-86-83 00:20:00





             Test Item    Value        Reference Range Interpretation Comments

 

             Eosinophils (test code = 1.4          See_Comment                [A

utomated message] The



             Eosinophils)                                        system which ge

nerated



                                                                 this result tra

nsmitted



                                                                 reference range

: <=4.0.



                                                                 The reference r

zhen was



                                                                 not used to int

erpret



                                                                 this result as



                                                                 normal/abnormal

.



[facility] CenterDwjrghuHUZMRMMQDZ4539-99-19 00:20:00





             Test Item    Value        Reference Range Interpretation Comments

 

             Basophils (test code = 0.9          See_Comment                [Aut

omated message] The



             Basophils)                                          system which ge

nerated



                                                                 this result tra

nsmitted



                                                                 reference range

: <=1.0.



                                                                 The reference r

zhen was



                                                                 not used to int

erpret



                                                                 this result as



                                                                 normal/abnormal

.



[facility]048[facility]





             Test Item    Value        Reference Range Interpretation Comments

 

             Neutrophils # (test code = Neutrophils 6.9          1.5-8.1        

           



             #)                                                  



[facility] CenterIrkmsnnXQGQXMYDEH7735-74-75 00:20:00





             Test Item    Value        Reference Range Interpretation Comments

 

             Lymphocytes # (test code = Lymphocytes 2.0          1.0-5.5        

           



             #)                                                  



[facility] CenterImfjtbrUNMTRRPNYE1895-25-92 00:20:00





             Test Item    Value        Reference Range Interpretation Comments

 

             Monocytes # (test code 1.0          See_Comment                [Aut

omated message] The



             = Monocytes #)                                        system which 

generated



                                                                 this result tra

nsmitted



                                                                 reference range

: <=0.8.



                                                                 The reference r

zhen was



                                                                 not used to int

erpret



                                                                 this result as



                                                                 normal/abnormal

.



[facility] CenterDpidhffGUFHLTGJLQ5145-31-07 00:20:00





             Test Item    Value        Reference Range Interpretation Comments

 

             Eosinophils # (test code 0.1          See_Comment                [A

utomated message] The



             = Eosinophils #)                                        system whic

h generated



                                                                 this result tra

nsmitted



                                                                 reference range

: <=0.5.



                                                                 The reference r

zhen was



                                                                 not used to int

erpret



                                                                 this result as



                                                                 normal/abnormal

.



[facility] CenterIzwrqcfDDLNXYGWZF5625-89-90 00:20:00





             Test Item    Value        Reference Range Interpretation Comments

 

             Basophils # (test code 0.1          See_Comment                [Aut

omated message] The



             = Basophils #)                                        system which 

generated



                                                                 this result tra

nsmitted



                                                                 reference range

: <=0.2.



                                                                 The reference r

zhen was



                                                                 not used to int

erpret



                                                                 this result as



                                                                 normal/abnormal

.



[facility] DrdobttNXNAYWXUME6631-21-27 00:20:00





             Test Item    Value        Reference Range Interpretation Comments

 

             C-REACTIVE PROTEIN (test code = 13.2                               

    



             C-REACTIVE PROTEIN)                                        



[facility]oo[facility]om [id number] 00:20:00





             Test Item    Value        Reference Range Interpretation Comments

 

             ABO/Rh (test code = ABO/Rh) AB POS                                 



[facility] MPCJFJS3271-67-41 00:20:00





             Test Item    Value        Reference Range Interpretation Comments

 

             Antibody Scrn (test Negative (22 7:20                          

 



             code = Antibody Scrn) PM)                                    



[facility] H-art ([facility])2022-04-09 00:20:00





             Test Item    Value        Reference Range Interpretation Comments

 

             Glucose Lvl (test code = Glucose Lvl) 74           70-99           

          



[facility]2022-04-09 00:20:00





             Test Item    Value        Reference Range Interpretation Comments

 

             BUN (test code = BUN) 15           7-22                      



[facility]2022-04-09 00:20:00





             Test Item    Value        Reference Range Interpretation Comments

 

             Creatinine Lvl (test code = Creatinine 0.61         0.50-1.40      

           



             Lvl)                                                



[facility]2022-04-09 00:20:00





             Test Item    Value        Reference Range Interpretation Comments

 

             Sodium Lvl (test code = Sodium Lvl) 139          135-145           

        



[facility]2022-04-09 00:20:00





             Test Item    Value        Reference Range Interpretation Comments

 

             Potassium Lvl (test code = Potassium 4.9          3.5-5.1          

         



             Lvl)                                                



[facility]2022-04-09 00:20:00





             Test Item    Value        Reference Range Interpretation Comments

 

             Chloride Lvl (test code = Chloride Lvl) 105                  

            



[facility]2022-04-09 00:20:00





             Test Item    Value        Reference Range Interpretation Comments

 

             CO2 (test code = CO2) 30           24-32                     



[facility]2022-04-09 00:20:00





             Test Item    Value        Reference Range Interpretation Comments

 

             Calcium Lvl (test code = Calcium Lvl) 9.5          8.5-10.5        

          



[facility]2022-04-09 00:20:00





             Test Item    Value        Reference Range Interpretation Comments

 

             AGAP (test code = AGAP) 8.9          10.0-20.0                 



[facility]2022-04-09 00:20:00





             Test Item    Value        Reference Range Interpretation Comments

 

             eGFR (test code = eGFR) 129                                    



[facility]2022-04-09 00:20:00





             Test Item    Value        Reference Range Interpretation Comments

 

             Lactic Acid Lvl (test code = Lactic 1.2          0.5-2.2           

        



             Acid Lvl)                                           



[place] 924252-04-09 00:20:00





             Test Item    Value        Reference Range Interpretation Comments

 

             Procalcitonin Lvl (test 0.09         See_Comment                [Au

tomated message]



             code = Procalcitonin Lvl)                                        

e system which



                                                                 generated this 

result



                                                                 transmitted ref

erence



                                                                 range: <=0.10. 

The



                                                                 reference range

 was not



                                                                 used to interpr

et this



                                                                 result as



                                                                 normal/abnormal

.



[facility]





             Test Item    Value        Reference Range Interpretation Comments

 

             WBC (test code = WBC) 10.1         3.7-10.4                  



[facility] CenterXxlkppfSEFVVQUYYR1267-80-11 00:20:00





             Test Item    Value        Reference Range Interpretation Comments

 

             RBC (test code = RBC) 5.14         4.70-6.10                 



[facility] CenterNmofxujBGFJRFVOJR0245-79-43 00:20:00





             Test Item    Value        Reference Range Interpretation Comments

 

             Hgb (test code = Hgb) 15.5         14.0-18.0                 



[place] 732852-04-09 00:20:00





             Test Item    Value        Reference Range Interpretation Comments

 

             Hct (test code = Hct) 46.5         42.0-54.0                 



[place] 732852-04-09 00:20:00





             Test Item    Value        Reference Range Interpretation Comments

 

             MCV (test code = MCV) 90.5         80.0-94.0                 



[place] 732852-04-09 00:20:00





             Test Item    Value        Reference Range Interpretation Comments

 

             MCH (test code = MCH) 30.1 pg      27.0-31.0                 



[place] 732852-04-09 00:20:00





             Test Item    Value        Reference Range Interpretation Comments

 

             MCHC (test code = MCHC) 33.3         32.0-36.0                 



[place] [address]-04-09 00:20:00





             Test Item    Value        Reference Range Interpretation Comments

 

             RDW (test code = RDW) 15.7         11.5-14.5                 



[place] [address]-04-09 00:20:00





             Test Item    Value        Reference Range Interpretation Comments

 

             Platelet (test code = Platelet) 302          133-450               

    



[place] 732852-04-09 00:20:00





             Test Item    Value        Reference Range Interpretation Comments

 

             MPV (test code = MPV) 8.9          7.4-10.4                  



[place] 732852-04-09 00:20:00





             Test Item    Value        Reference Range Interpretation Comments

 

             Sed Rate (test code = 17           See_Comment                [Auto

mated message] The



             Sed Rate)                                           system which ge

nerated this



                                                                 result transmit

huma



                                                                 reference range

: <=15. The



                                                                 reference range

 was not



                                                                 used to interpr

et this



                                                                 result as zayda

l/abnormal.



[place] 732852-04-09 00:20:00





             Test Item    Value        Reference Range Interpretation Comments

 

             PT (test code = PT) 13.1 s       12.0-14.7                 



[facility] CenterIvccqkaKBVILMDXSF5052-11-16 [facility]20:00





             Test Item    Value        Reference Range Interpretation Comments

 

             INR (test code = INR) 1.00 1       0.85-1.17                 



[place] [address]-04-09 00:20:00





             Test Item    Value        Reference Range Interpretation Comments

 

             PTT (test code = PTT) 31.5 s       22.9-35.8                 



[place] [address]-04-09 00:20:00





             Test Item    Value        Reference Range Interpretation Comments

 

             Segs (test code = Segs) 68.7         45.0-75.0                 



[place] 732852-04-09 00:20:00





             Test Item    Value        Reference Range Interpretation Comments

 

             Lymphocytes (test code = Lymphocytes) 19.6         20.0-40.0       

          



[place] [address]-04-09 00:20:00





             Test Item    Value        Reference Range Interpretation Comments

 

             Monocytes (test code = Monocytes) 9.4          2.0-12.0            

      



[place] [address]-04-09 00:20:00





             Test Item    Value        Reference Range Interpretation Comments

 

             Eosinophils (test code = 1.4          See_Comment                [A

utomated message] The



             Eosinophils)                                        system which ge

nerated



                                                                 this result tra

nsmitted



                                                                 reference range

: <=4.0.



                                                                 The reference r

zhen was



                                                                 not used to int

erpret



                                                                 this result as



                                                                 normal/abnormal

.



[facility] CenterPiigmysVZWHBCNCQS5041-17-15 00:20:00





             Test Item    Value        Reference Range Interpretation Comments

 

             Basophils (test code = 0.9          See_Comment                [Aut

omated message] The



             Basophils)                                          system which ge

nerated



                                                                 this result tra

nsmitted



                                                                 reference range

: <=1.0.



                                                                 The reference r

zhen was



                                                                 not used to int

erpret



                                                                 this result as



                                                                 normal/abnormal

.



[facility] CenterVdpfcvwDUNNIAIAMG5914-74-91 00:20:00





             Test Item    Value        Reference Range Interpretation Comments

 

             Neutrophils # (test code = Neutrophils 6.9          1.5-8.1        

           



             #)                                                  



[facility] CenterPuzbwndRUENZHKFLU9716-91-74 00:20:00





             Test Item    Value        Reference Range Interpretation Comments

 

             Lymphocytes # (test code = Lymphocytes 2.0          1.0-5.5        

           



             #)                                                  



[facility] CenterVdgbxvpMXZUXBXUOF8403-09-15 00:20:00





             Test Item    Value        Reference Range Interpretation Comments

 

             Monocytes # (test code 1.0          See_Comment                [Aut

omated message] The



             = Monocytes #)                                        system which 

generated



                                                                 this result tra

nsmitted



                                                                 reference range

: <=0.8.



                                                                 The reference r

zhen was



                                                                 not used to int

erpret



                                                                 this result as



                                                                 normal/abnormal

.



[facility] CenterZlakdwkQTZORXWIRA4625-89-70 00:20:00





             Test Item    Value        Reference Range Interpretation Comments

 

             Eosinophils # (test code 0.1          See_Comment                [A

utomated message] The



             = Eosinophils #)                                        system whic

h generated



                                                                 this result tra

nsmitted



                                                                 reference range

: <=0.5.



                                                                 The reference r

zhen was



                                                                 not used to int

erpret



                                                                 this result as



                                                                 normal/abnormal

.



[facility] CenterYmqgbobPNSWVXKLRN4490-74-55 00:20:00





             Test Item    Value        Reference Range Interpretation Comments

 

             Basophils # (test code 0.1          See_Comment                [Aut

omated message] The



             = Basophils #)                                        system which 

generated



                                                                 this result tra

nsmitted



                                                                 reference range

: <=0.2.



                                                                 The reference r

zhen was



                                                                 not used to int

erpret



                                                                 this result as



                                                                 normal/abnormal

.



[facility]NjtwiwbPVSLOQRGPD4182-23-71 [facility]:00





             Test Item    Value        Reference Range Interpretation Comments

 

             C-REACTIVE PROTEIN (test code = 13.2                               

    



             C-REACTIVE PROTEIN)                                        



[facility] MJLRFKT7282-04-12 00:20:00





             Test Item    Value        Reference Range Interpretation Comments

 

             ABO/Rh (test code = ABO/Rh) AB POS                                 



[facility] OJOSOCV3492-01-32 00:20:00





             Test Item    Value        Reference Range Interpretation Comments

 

             Antibody Scrn (test Negative (22 7:20                          

 



             code = Antibody Scrn) PM)                                    



[facility]2022-04-09 00:20:00





             Test Item    Value        Reference Range Interpretation Comments

 

             Glucose Lvl (test code = Glucose Lvl) 74           70-99           

          



[facility]2022-04-09 00:20:00





             Test Item    Value        Reference Range Interpretation Comments

 

             BUN (test code = BUN) 15           7-22                      



[facility]2022-04-09 00:20:00





             Test Item    Value        Reference Range Interpretation Comments

 

             Creatinine Lvl (test code = Creatinine 0.61         0.50-1.40      

           



             Lvl)                                                



[facility]2022-04-09 00:20:00





             Test Item    Value        Reference Range Interpretation Comments

 

             Sodium Lvl (test code = Sodium Lvl) 139          135-145           

        



[facility]2022-04-09 00:20:00





             Test Item    Value        Reference Range Interpretation Comments

 

             Potassium Lvl (test code = Potassium 4.9          3.5-5.1          

         



             Lvl)                                                



[facility]2022-04-09 00:20:00





             Test Item    Value        Reference Range Interpretation Comments

 

             Chloride Lvl (test code = Chloride Lvl) 105                  

            



[facility]2022-04-09 00:20:00





             Test Item    Value        Reference Range Interpretation Comments

 

             CO2 (test code = CO2) 30           24-32                     



[facility]2022-04-09 00:20:00





             Test Item    Value        Reference Range Interpretation Comments

 

             Calcium Lvl (test code = Calcium Lvl) 9.5          8.5-10.5        

          



[facility]2022-04-09 00:20:00





             Test Item    Value        Reference Range Interpretation Comments

 

             AGAP (test code = AGAP) 8.9          10.0-20.0                 



[facility]2022-04-09 00:20:00





             Test Item    Value        Reference Range Interpretation Comments

 

             eGFR (test code = eGFR) 129                                    



[facility]2022-04-09 00:20:00





             Test Item    Value        Reference Range Interpretation Comments

 

             Lactic Acid Lvl (test code = Lactic 1.2          0.5-2.2           

        



             Acid Lvl)                                           



[facility]2022-04-09 00:20:00





             Test Item    Value        Reference Range Interpretation Comments

 

             Procalcitonin Lvl (test 0.09         See_Comment                [Au

tomated message]



             code = Procalcitonin Lvl)                                        Th

e system which



                                                                 generated this 

result



                                                                 transmitted ref

erence



                                                                 range: <=0.10. 

The



                                                                 reference range

 was not



                                                                 used to interpr

et this



                                                                 result as



                                                                 normal/abnormal

.



[facility] CenterPxvlshbGIXQCEPYNM4059-03-47 00:20:00





             Test Item    Value        Reference Range Interpretation Comments

 

             WBC (test code = WBC) 10.1         3.7-10.4                  



[place] 732852-04-09 00:20:00





             Test Item    Value        Reference Range Interpretation Comments

 

             RBC (test code = RBC) 5.14         4.70-6.10                 



[place] 732852-04-09 00:20:00





             Test Item    Value        Reference Range Interpretation Comments

 

             Hgb (test code = Hgb) 15.5         14.0-18.0                 



[place] 732852-04-09 00:20:00





             Test Item    Value        Reference Range Interpretation Comments

 

             Hct (test code = Hct) 46.5         42.0-54.0                 



[place] 732852-04-09 00:20:00





             Test Item    Value        Reference Range Interpretation Comments

 

             MCV (test code = MCV) 90.5         80.0-94.0                 



[place] 732852-04-09 00:20:00





             Test Item    Value        Reference Range Interpretation Comments

 

             MCH (test code = MCH) 30.1 pg      27.0-31.0                 



[place] 732852-04-09 00:20:00





             Test Item    Value        Reference Range Interpretation Comments

 

             MCHC (test code = MCHC) 33.3         32.0-36.0                 



[place] 732852-04-09 00:20:00





             Test Item    Value        Reference Range Interpretation Comments

 

             RDW (test code = RDW) 15.7         11.5-14.5                 



[place] 732852-04-09 00:20:00





             Test Item    Value        Reference Range Interpretation Comments

 

             Platelet (test code = Platelet) 302          133-450               

    



[place] 732852-04-09 00:20:00





             Test Item    Value        Reference Range Interpretation Comments

 

             MPV (test code = MPV) 8.9          7.4-10.4                  



[place] 732852-04-09 00:20:00





             Test Item    Value        Reference Range Interpretation Comments

 

             Sed Rate (test code = 17           See_Comment                [Auto

mated message] The



             Sed Rate)                                           system which ge

nerated this



                                                                 result transmit

huma



                                                                 reference range

: <=15. The



                                                                 reference range

 was not



                                                                 used to interpr

et this



                                                                 result as zayda

l/abnormal.



[facility]sudugHRKDFIWULY2556[facility]78-61 [facility]





             Test Item    Value        Reference Range Interpretation Comments

 

             PT (test code = PT) 13.1 s       12.0-14.7                 



[facility] CenterEyoawgfOQGHSYAPOT6433-24-62 00:20:00





             Test Item    Value        Reference Range Interpretation Comments

 

             INR (test code = INR) 1.00 1       0.85-1.17                 



[place] 732852-04-09 00:20:00





             Test Item    Value        Reference Range Interpretation Comments

 

             PTT (test code = PTT) 31.5 s       22.9-35.8                 



[place] 732852-04-09 00:20:00





             Test Item    Value        Reference Range Interpretation Comments

 

             Segs (test code = Segs) 68.7         45.0-75.0                 



[facility] CenterOdbdhfxVYJVBISFQH0408-51-06 00:20:00





             Test Item    Value        Reference Range Interpretation Comments

 

             Lymphocytes (test code = Lymphocytes) 19.6         20.0-40.0       

          



[place] 732852-04-09 00:20:00





             Test Item    Value        Reference Range Interpretation Comments

 

             Monocytes (test code = Monocytes) 9.4          2.0-12.0            

      



[facility] CenterHdrudtjFJJSNUAATG2697-60-07 00:20:00





             Test Item    Value        Reference Range Interpretation Comments

 

             Eosinophils (test code = 1.4          See_Comment                [A

utomated message] The



             Eosinophils)                                        system which ge

nerated



                                                                 this result tra

nsmitted



                                                                 reference range

: <=4.0.



                                                                 The reference r

zhen was



                                                                 not used to int

erpret



                                                                 this result as



                                                                 normal/abnormal

.



[facility] CenterAvoxwigLITRKSHYQM7940-27-09 00:20:00





             Test Item    Value        Reference Range Interpretation Comments

 

             Basophils (test code = 0.9          See_Comment                [Aut

omated message] The



             Basophils)                                          system which ge

nerated



                                                                 this result tra

nsmitted



                                                                 reference range

: <=1.0.



                                                                 The reference r

zhen was



                                                                 not used to int

erpret



                                                                 this result as



                                                                 normal/abnormal

.



[place] 732852-04-09 00:20:00





             Test Item    Value        Reference Range Interpretation Comments

 

             Neutrophils # (test code = Neutrophils 6.9          1.5-8.1        

           



             #)                                                  



[facility]





             Test Item    Value        Reference Range Interpretation Comments

 

             Lymphocytes # (test code = Lymphocytes 2.0          1.0-5.5        

           



             #)                                                  



[facility] CenterBeftiiwSRWWVSFSVV9574-16-49 00:20:00





             Test Item    Value        Reference Range Interpretation Comments

 

             Monocytes # (test code 1.0          See_Comment                [Aut

omated message] The



             = Monocytes #)                                        system which 

generated



                                                                 this result tra

nsmitted



                                                                 reference range

: <=0.8.



                                                                 The reference r

zhen was



                                                                 not used to int

erpret



                                                                 this result as



                                                                 normal/abnormal

.



[facility] DallasRhsbznsNWXDRPLEMV3771-41-69 00:20:00





             Test Item    Value        Reference Range Interpretation Comments

 

             Eosinophils # (test code 0.1          See_Comment                [A

utomated message] The



             = Eosinophils #)                                        system whic

h generated



                                                                 this result tra

nsmitted



                                                                 reference range

: <=0.5.



                                                                 The reference r

zhen was



                                                                 not used to int

erpret



                                                                 this result as



                                                                 normal/abnormal

.



[facility] DallasMyznnseDYFFBRHSOP8966-68-81 00:20:00





             Test Item    Value        Reference Range Interpretation Comments

 

             Basophils # (test code 0.1          See_Comment                [Aut

omated message] The



             = Basophils #)                                        system which 

generated



                                                                 this result tra

nsmitted



                                                                 reference range

: <=0.2.



                                                                 The reference r

zhen was



                                                                 not used to int

erpret



                                                                 this result as



                                                                 normal/abnormal

.



[facility] DallasUpvqaknNYTYYQUHTU4186-98-63 00:20:00





             Test Item    Value        Reference Range Interpretation Comments

 

             C-REACTIVE PROTEIN (test code = 13.2                               

    



             C-REACTIVE PROTEIN)                                        



[facility] [employer] [id number] 00:20:00





             Test Item    Value        Reference Range Interpretation Comments

 

             ABO/Rh (test code = ABO/Rh) AB POS                                 



[facility] [employer] [id number] 00:20:00





             Test Item    Value        Reference Range Interpretation Comments

 

             Antibody Scrn (test Negative (22 7:20                          

 



             code = Antibody Scrn) PM)                                    



[facility] [employer] [id number] 00:20:00





             Test Item    Value        Reference Range Interpretation Comments

 

             Glucose Lvl (test code = Glucose Lvl) 74           70-99           

          



[facility] [employer] [id number] 00:20:00





             Test Item    Value        Reference Range Interpretation Comments

 

             BUN (test code = BUN) 15           7-22                      



[place] 924252-04-09 00:20:00





             Test Item    Value        Reference Range Interpretation Comments

 

             Creatinine Lvl (test code = Creatinine 0.61         0.50-1.40      

           



             Lvl)                                                



[place] 924252-04-09 00:20:00





             Test Item    Value        Reference Range Interpretation Comments

 

             Sodium Lvl (test code = Sodium Lvl) 139          135-145           

        



[place] 924252-04-09 00:20:00





             Test Item    Value        Reference Range Interpretation Comments

 

             Potassium Lvl (test code = Potassium 4.9          3.5-5.1          

         



             Lvl)                                                



[place] 924252-04-09 00:20:00





             Test Item    Value        Reference Range Interpretation Comments

 

             Chloride Lvl (test code = Chloride Lvl) 105                  

            



[place] 924252-04-09 00:20:00





             Test Item    Value        Reference Range Interpretation Comments

 

             CO2 (test code = CO2) 30           24-32                     



[place] 924252-04-09 00:20:00





             Test Item    Value        Reference Range Interpretation Comments

 

             Calcium Lvl (test code = Calcium Lvl) 9.5          8.5-10.5        

          



[place] 924252-04-09 00:20:00





             Test Item    Value        Reference Range Interpretation Comments

 

             AGAP (test code = AGAP) 8.9          10.0-20.0                 



[place] 924252-04-09 00:20:00





             Test Item    Value        Reference Range Interpretation Comments

 

             eGFR (test code = eGFR) 129                                    



[place] 924252-04-09 00:20:00





             Test Item    Value        Reference Range Interpretation Comments

 

             Lactic Acid Lvl (test code = Lactic 1.2          0.5-2.2           

        



             Acid Lvl)                                           



[place] 924252-04-09 00:20:00





             Test Item    Value        Reference Range Interpretation Comments

 

             Procalcitonin Lvl (test 0.09         See_Comment                [Au

tomated message]



             code = Procalcitonin Lvl)                                        

e system which



                                                                 generated this 

result



                                                                 transmitted ref

erence



                                                                 range: <=0.10. 

The



                                                                 reference range

 was not



                                                                 used to interpr

et this



                                                                 result as



                                                                 normal/abnormal

.



[place] 732852-04-09 00:20:00





             Test Item    Value        Reference Range Interpretation Comments

 

             WBC (test code = WBC) 10.1         3.7-10.4                  



[place] 732852-04-09 00:20:00





             Test Item    Value        Reference Range Interpretation Comments

 

             RBC (test code = RBC) 5.14         4.70-6.10                 



[facility] CenterWskozwjGLXPDNPHHS4441-42-05 00:20:00





             Test Item    Value        Reference Range Interpretation Comments

 

             Hgb (test code = Hgb) 15.5         14.0-18.0                 



[place] [address]-04-09 00:20:00





             Test Item    Value        Reference Range Interpretation Comments

 

             Hct (test code = Hct) 46.5         42.0-54.0                 



[facility] CenterLrdbojqRWGTYCLHYG0454-83-36 00:20:00





             Test Item    Value        Reference Range Interpretation Comments

 

             MCV (test code = MCV) 90.5         80.0-94.0                 



[facility] CenterLzttnrjSQNWMNMWPB4656-48-22 00:20:00





             Test Item    Value        Reference Range Interpretation Comments

 

             MCH (test code = MCH) 30.1 pg      27.0-31.0                 



[place] 732852-04-09 00:20:00





             Test Item    Value        Reference Range Interpretation Comments

 

             MCHC (test code = MCHC) 33.3         32.0-36.0                 



[place] 732852-04-09 00:20:00





             Test Item    Value        Reference Range Interpretation Comments

 

             RDW (test code = RDW) 15.7         11.5-14.5                 



[facility] CenterIlamauaPMPSMLIVME5744-48-09 00:20:00





             Test Item    Value        Reference Range Interpretation Comments

 

             Platelet (test code = Platelet) 302          133-450               

    



[facility] CenterPkmpelmQGMSYRCTKF7990-13-89 00:20:00





             Test Item    Value        Reference Range Interpretation Comments

 

             MPV (test code = MPV) 8.9          7.4-10.4                  



[place] 732852-04-09 00:20:00





             Test Item    Value        Reference Range Interpretation Comments

 

             Sed Rate (test code = 17           See_Comment                [Auto

mated message] The



             Sed Rate)                                           system which ge

nerated this



                                                                 result transmit

huma



                                                                 reference range

: <=15. The



                                                                 reference range

 was not



                                                                 used to interpr

et this



                                                                 result as zayda

l/abnormal.



[facility] CenterPjicuqzKQAUATRVOI7877-27-24 00:20:00





             Test Item    Value        Reference Range Interpretation Comments

 

             PT (test code = PT) 13.1 s       12.0-14.7                 



[place] 732852-04-09 00:20:00





             Test Item    Value        Reference Range Interpretation Comments

 

             INR (test code = INR) 1.00 1       0.85-1.17                 



[facility] CenterEedqvceICMEVAIFFA0568-96-07 00:20:00





             Test Item    Value        Reference Range Interpretation Comments

 

             PTT (test code = PTT) 31.5 s       22.9-35.8                 



[facility] CenterAelgtepVKNPDEQVOA4828-61-55 00:20:00





             Test Item    Value        Reference Range Interpretation Comments

 

             Segs (test code = Segs) 68.7         45.0-75.0                 



[facility] CenterLxweuybMCCVPUNCVC7681-99-24 00:20:00





             Test Item    Value        Reference Range Interpretation Comments

 

             Lymphocytes (test code = Lymphocytes) 19.6         20.0-40.0       

          



[facility] CenterEihvpfrDGBEZPUYDT7761-70-32 00:20:00





             Test Item    Value        Reference Range Interpretation Comments

 

             Monocytes (test code = Monocytes) 9.4          2.0-12.0            

      



[facility] CenterUejejabNPPZXOQTFF5575-55-84 00:20:00





             Test Item    Value        Reference Range Interpretation Comments

 

             Eosinophils (test code = 1.4          See_Comment                [A

utomated message] The



             Eosinophils)                                        system which ge

nerated



                                                                 this result tra

nsmitted



                                                                 reference range

: <=4.0.



                                                                 The reference r

zhen was



                                                                 not used to int

erpret



                                                                 this result as



                                                                 normal/abnormal

.



[facility] CenterGmzktmzNPJSYMENJL2495-00-58 00:20:00





             Test Item    Value        Reference Range Interpretation Comments

 

             Basophils (test code = 0.9          See_Comment                [Aut

omated message] The



             Basophils)                                          system which ge

nerated



                                                                 this result tra

nsmitted



                                                                 reference range

: <=1.0.



                                                                 The reference r

zhen was



                                                                 not used to int

erpret



                                                                 this result as



                                                                 normal/abnormal

.



[facility] CenterHvmsqhsZDUBCKBGIB9653-65-35 00:20:00





             Test Item    Value        Reference Range Interpretation Comments

 

             Neutrophils # (test code = Neutrophils 6.9          1.5-8.1        

           



             #)                                                  



[facility] CenterUtvrankAVAWKIKMZS8190-36-21 00:20:00





             Test Item    Value        Reference Range Interpretation Comments

 

             Lymphocytes # (test code = Lymphocytes 2.0          1.0-5.5        

           



             #)                                                  



[facility] CenterPkvmkojHRUJTLYGCJ3083-11-35 00:20:00





             Test Item    Value        Reference Range Interpretation Comments

 

             Monocytes # (test code 1.0          See_Comment                [Aut

omated message] The



             = Monocytes #)                                        system which 

generated



                                                                 this result tra

nsmitted



                                                                 reference range

: <=0.8.



                                                                 The reference r

zhen was



                                                                 not used to int

erpret



                                                                 this result as



                                                                 normal/abnormal

.



[place] 94[address]-04-09 00:20:00





             Test Item    Value        Reference Range Interpretation Comments

 

             Eosinophils # (test code 0.1          See_Comment                [A

utomated message] The



             = Eosinophils #)                                        system whic

h generated



                                                                 this result tra

nsmitted



                                                                 reference range

: <=0.5.



                                                                 The reference r

zhen was



                                                                 not used to int

erpret



                                                                 this result as



                                                                 normal/abnormal

.



[facility] KdjeutfGICNCHVWQR0762-71-87 00:20:00





             Test Item    Value        Reference Range Interpretation Comments

 

             Basophils # (test code 0.1          See_Comment                [Aut

omated message] The



             = Basophils #)                                        system which 

generated



                                                                 this result tra

nsmitted



                                                                 reference range

: <=0.2.



                                                                 The reference r

zhen was



                                                                 not used to int

erpret



                                                                 this result as



                                                                 normal/abnormal

.



[facility] TaqacxbQTSDJNVBXO4403-32-83 00:20:00





             Test Item    Value        Reference Range Interpretation Comments

 

             C-REACTIVE PROTEIN (test code = 13.2                               

    



             C-REACTIVE PROTEIN)                                        



[employer] UAMYFLF7521-13-03 00:20:00





             Test Item    Value        Reference Range Interpretation Comments

 

             ABO/Rh (test code = ABO/Rh) AB POS                                 



[facility] [employer] GPOIXGZ2847-85-51 00:20:00





             Test Item    Value        Reference Range Interpretation Comments

 

             Antibody Scrn (test Negative (22 7:20                          

 



             code = Antibody Scrn) PM)                                    



[facility] [employer])2022-04-09 00:20:00





             Test Item    Value        Reference Range Interpretation Comments

 

             Glucose Lvl (test code = Glucose Lvl) 74           70-99           

          



[facility] [employer] [facility])2022-04-09 00:20:00





             Test Item    Value        Reference Range Interpretation Comments

 

             BUN (test code = BUN) 15           7-22                      



[employer]2022-04-09 00:20:00





             Test Item    Value        Reference Range Interpretation Comments

 

             Creatinine Lvl (test code = Creatinine 0.61         0.50-1.40      

           



             Lvl)                                                



[employer]2022-04-09 00:20:00





             Test Item    Value        Reference Range Interpretation Comments

 

             Sodium Lvl (test code = Sodium Lvl) 139          135-145           

        



[facility] [employer] ([facility])2022-04-09 00:20:00





             Test Item    Value        Reference Range Interpretation Comments

 

             Potassium Lvl (test code = Potassium 4.9          3.5-5.1          

         



             Lvl)                                                



[employer]2022-04-09 00:20:00





             Test Item    Value        Reference Range Interpretation Comments

 

             Chloride Lvl (test code = Chloride Lvl) 105                  

            



[place] 924252-04-09 00:20:00





             Test Item    Value        Reference Range Interpretation Comments

 

             CO2 (test code = CO2) 30           24-32                     



[place] 924252-04-09 00:20:00





             Test Item    Value        Reference Range Interpretation Comments

 

             Calcium Lvl (test code = Calcium Lvl) 9.5          8.5-10.5        

          



[place] 924252-04-09 00:20:00





             Test Item    Value        Reference Range Interpretation Comments

 

             AGAP (test code = AGAP) 8.9          10.0-20.0                 



[place] 924252-04-09 00:20:00





             Test Item    Value        Reference Range Interpretation Comments

 

             eGFR (test code = eGFR) 129                                    



[place] 924252-04-09 00:20:00





             Test Item    Value        Reference Range Interpretation Comments

 

             Lactic Acid Lvl (test code = Lactic 1.2          0.5-2.2           

        



             Acid Lvl)                                           



[place] 924252-04-09 00:20:00





             Test Item    Value        Reference Range Interpretation Comments

 

             Procalcitonin Lvl (test 0.09         See_Comment                [Au

tomated message]



             code = Procalcitonin Lvl)                                        Th

e system which



                                                                 generated this 

result



                                                                 transmitted ref

erence



                                                                 range: <=0.10. 

The



                                                                 reference range

 was not



                                                                 used to interpr

et this



                                                                 result as



                                                                 normal/abnormal

.



[place] 732852-04-09 00:20:00





             Test Item    Value        Reference Range Interpretation Comments

 

             WBC (test code = WBC) 10.1         3.7-10.4                  



[place] 732852-04-09 00:20:00





             Test Item    Value        Reference Range Interpretation Comments

 

             RBC (test code = RBC) 5.14         4.70-6.10                 



[place] [address]-04-09 00:20:00





             Test Item    Value        Reference Range Interpretation Comments

 

             Hgb (test code = Hgb) 15.5         14.0-18.0                 



[place] [address]-04-09 00:20:00





             Test Item    Value        Reference Range Interpretation Comments

 

             Hct (test code = Hct) 46.5         42.0-54.0                 



[place] [address]-04-09 00:20:00





             Test Item    Value        Reference Range Interpretation Comments

 

             MCV (test code = MCV) 90.5         80.0-94.0                 



[facility]XCEZYVKTM8925[facility]17[facility]63 [facility]





             Test Item    Value        Reference Range Interpretation Comments

 

             MCH (test code = MCH) 30.1 pg      27.0-31.0                 



[facility] CenterPlornrfTDOGJHIYOT9679-96-75 00:20:00





             Test Item    Value        Reference Range Interpretation Comments

 

             MCHC (test code = MCHC) 33.3         32.0-36.0                 



[facility] CenterTycfmqnLTUWVZTMXD2839-44-39 00:20:00





             Test Item    Value        Reference Range Interpretation Comments

 

             RDW (test code = RDW) 15.7         11.5-14.5                 



[facility] CenterZdoyhluLLYSDBSRQM8829-51-37 00:20:00





             Test Item    Value        Reference Range Interpretation Comments

 

             Platelet (test code = Platelet) 302          133-450               

    



[facility] CenterBlzatskEBDZHAXAKJ7069-30-14 00:20:00





             Test Item    Value        Reference Range Interpretation Comments

 

             MPV (test code = MPV) 8.9          7.4-10.4                  



[facility] CenterFjbhagjJBAPVZAGYA5493-29-23 00:20:00





             Test Item    Value        Reference Range Interpretation Comments

 

             Sed Rate (test code = 17           See_Comment                [Auto

mated message] The



             Sed Rate)                                           system which ge

nerated this



                                                                 result transmit

huma



                                                                 reference range

: <=15. The



                                                                 reference range

 was not



                                                                 used to interpr

et this



                                                                 result as zayda

l/abnormal.



[facility] CenterSlhfderQQJKVMGUDH0701-42-87 00:20:00





             Test Item    Value        Reference Range Interpretation Comments

 

             PT (test code = PT) 13.1 s       12.0-14.7                 



[facility] CenterWttgiroALBZLVCLVQ1943-24-91 00:20:00





             Test Item    Value        Reference Range Interpretation Comments

 

             INR (test code = INR) 1.00 1       0.85-1.17                 



[place] 732852-04-09 00:20:00





             Test Item    Value        Reference Range Interpretation Comments

 

             PTT (test code = PTT) 31.5 s       22.9-35.8                 



[facility] CenterCjmzxasSAEDIYAMWA0046-23-78 00:20:00





             Test Item    Value        Reference Range Interpretation Comments

 

             Segs (test code = Segs) 68.7         45.0-75.0                 



[facility] CenterNihxqikJKDDUIWMGM0530-94-40 00:20:00





             Test Item    Value        Reference Range Interpretation Comments

 

             Lymphocytes (test code = Lymphocytes) 19.6         20.0-40.0       

          



[place] [address]-04-09 00:20:00





             Test Item    Value        Reference Range Interpretation Comments

 

             Monocytes (test code = Monocytes) 9.4          2.0-12.0            

      



[place] 732852-04-09 00:20:00





             Test Item    Value        Reference Range Interpretation Comments

 

             Eosinophils (test code = 1.4          See_Comment                [A

utomated message] The



             Eosinophils)                                        system which ge

nerated



                                                                 this result tra

nsmitted



                                                                 reference range

: <=4.0.



                                                                 The reference r

zhen was



                                                                 not used to int

erpret



                                                                 this result as



                                                                 normal/abnormal

.



[place] 732852-04-09 00:20:00





             Test Item    Value        Reference Range Interpretation Comments

 

             Basophils (test code = 0.9          See_Comment                [Aut

omated message] The



             Basophils)                                          system which ge

nerated



                                                                 this result tra

nsmitted



                                                                 reference range

: <=1.0.



                                                                 The reference r

zhen was



                                                                 not used to int

erpret



                                                                 this result as



                                                                 normal/abnormal

.



[place] 732852-04-09 00:20:00





             Test Item    Value        Reference Range Interpretation Comments

 

             Neutrophils # (test code = Neutrophils 6.9          1.5-8.1        

           



             #)                                                  



[place] 732852-04-09 00:20:00





             Test Item    Value        Reference Range Interpretation Comments

 

             Lymphocytes # (test code = Lymphocytes 2.0          1.0-5.5        

           



             #)                                                  



[place] 732852-04-09 00:20:00





             Test Item    Value        Reference Range Interpretation Comments

 

             Monocytes # (test code 1.0          See_Comment                [Aut

omated message] The



             = Monocytes #)                                        system which 

generated



                                                                 this result tra

nsmitted



                                                                 reference range

: <=0.8.



                                                                 The reference r

zhen was



                                                                 not used to int

erpret



                                                                 this result as



                                                                 normal/abnormal

.



[place] [address]-04-09 00:20:00





             Test Item    Value        Reference Range Interpretation Comments

 

             Eosinophils # (test code 0.1          See_Comment                [A

utomated message] The



             = Eosinophils #)                                        system whic

h generated



                                                                 this result tra

nsmitted



                                                                 reference range

: <=0.5.



                                                                 The reference r

zhen was



                                                                 not used to int

erpret



                                                                 this result as



                                                                 normal/abnormal

.



[place] [address]-04-09 00:20:00





             Test Item    Value        Reference Range Interpretation Comments

 

             Basophils # (test code 0.1          See_Comment                [Aut

omated message] The



             = Basophils #)                                        system which 

generated



                                                                 this result tra

nsmitted



                                                                 reference range

: <=0.2.



                                                                 The reference r

zhen was



                                                                 not used to int

erpret



                                                                 this result as



                                                                 normal/abnormal

.



[facility] LwcicdeCXRJNIFXOR1685-36-99 00:20:00





             Test Item    Value        Reference Range Interpretation Comments

 

             C-REACTIVE PROTEIN (test code = 13.2                               

    



             C-REACTIVE PROTEIN)                                        



[facility] [employer] [id number] 00:20:00





             Test Item    Value        Reference Range Interpretation Comments

 

             ABO/Rh (test code = ABO/Rh) AB POS                                 



[facility] [employer] [id number] 00:20:00





             Test Item    Value        Reference Range Interpretation Comments

 

             Antibody Scrn (test Negative (22 7:20                          

 



             code = Antibody Scrn) PM)                                    



[facility] [employer] [id number] 00:20:00





             Test Item    Value        Reference Range Interpretation Comments

 

             Glucose Lvl (test code = Glucose Lvl) 74           70-99           

          



[facility] [employer] [id number] 00:20:00





             Test Item    Value        Reference Range Interpretation Comments

 

             BUN (test code = BUN) 15           7-22                      



[facility] [employer] [id number] 00:20:00





             Test Item    Value        Reference Range Interpretation Comments

 

             Creatinine Lvl (test code = Creatinine 0.61         0.50-1.40      

           



             Lvl)                                                



[facility] [employer] [id number] 00:20:00





             Test Item    Value        Reference Range Interpretation Comments

 

             Sodium Lvl (test code = Sodium Lvl) 139          135-145           

        



[facility] [employer] [id number] 00:20:00





             Test Item    Value        Reference Range Interpretation Comments

 

             Potassium Lvl (test code = Potassium 4.9          3.5-5.1          

         



             Lvl)                                                



[facility] [employer] [id number] 00:20:00





             Test Item    Value        Reference Range Interpretation Comments

 

             Chloride Lvl (test code = Chloride Lvl) 105                  

            



[facility] [employer] [id number] 00:20:00





             Test Item    Value        Reference Range Interpretation Comments

 

             CO2 (test code = CO2) 30           24-32                     



[facility] [employer] [id number] 00:20:00





             Test Item    Value        Reference Range Interpretation Comments

 

             Calcium Lvl (test code = Calcium Lvl) 9.5          8.5-10.5        

          



[facility] [employer] [id number] 00:20:00





             Test Item    Value        Reference Range Interpretation Comments

 

             AGAP (test code = AGAP) 8.9          10.0-20.0                 



[facility] [id number] [facility]20:00





             Test Item    Value        Reference Range Interpretation Comments

 

             eGFR (test code = eGFR) 129                                    



[facility] [id number] 00:20:00





             Test Item    Value        Reference Range Interpretation Comments

 

             Lactic Acid Lvl (test code = Lactic 1.2          0.5-2.2           

        



             Acid Lvl)                                           



[facility] [id number] 00:20:00





             Test Item    Value        Reference Range Interpretation Comments

 

             Procalcitonin Lvl (test 0.09         See_Comment                [Au

tomated message]



             code = Procalcitonin Lvl)                                        Th

e system which



                                                                 generated this 

result



                                                                 transmitted ref

erence



                                                                 range: <=0.10. 

The



                                                                 reference range

 was not



                                                                 used to interpr

et this



                                                                 result as



                                                                 normal/abnormal

.



[facility] [id number] [facility]:20:00





             Test Item    Value        Reference Range Interpretation Comments

 

             WBC (test code = WBC) 10.1         3.7-10.4                  



[place] 732852-04-09 00:20:00





             Test Item    Value        Reference Range Interpretation Comments

 

             RBC (test code = RBC) 5.14         4.70-6.10                 



[facility] [id number] 00:20:00





             Test Item    Value        Reference Range Interpretation Comments

 

             Hgb (test code = Hgb) 15.5         14.0-18.0                 



[facility] CenterNaadahzVLQGFHMRKM2171-00-60 00:20:00





             Test Item    Value        Reference Range Interpretation Comments

 

             Hct (test code = Hct) 46.5         42.0-54.0                 



[place] 732852-04-09 00:20:00





             Test Item    Value        Reference Range Interpretation Comments

 

             MCV (test code = MCV) 90.5         80.0-94.0                 



[place] 732852-04-09 00:20:00





             Test Item    Value        Reference Range Interpretation Comments

 

             MCH (test code = MCH) 30.1 pg      27.0-31.0                 



[place] 732852-04-09 00:20:00





             Test Item    Value        Reference Range Interpretation Comments

 

             MCHC (test code = MCHC) 33.3         32.0-36.0                 



[place] 732852-04-09 00:20:00





             Test Item    Value        Reference Range Interpretation Comments

 

             RDW (test code = RDW) 15.7         11.5-14.5                 



[place] 732852-04-09 00:20:00





             Test Item    Value        Reference Range Interpretation Comments

 

             Platelet (test code = Platelet) 302          133-450               

    



[facility] C[id number] 00:20:00





             Test Item    Value        Reference Range Interpretation Comments

 

             MPV (test code = MPV) 8.9          7.4-10.4                  



[facility] C[id number] 00:20:00





             Test Item    Value        Reference Range Interpretation Comments

 

             Sed Rate (test code = 17           See_Comment                [Auto

mated message] The



             Sed Rate)                                           system which ge

nerated this



                                                                 result transmit

huma



                                                                 reference range

: <=15. The



                                                                 reference range

 was not



                                                                 used to interpr

et this



                                                                 result as zayda

l/abnormal.



[facility] C[id number] 00:20:00





             Test Item    Value        Reference Range Interpretation Comments

 

             PT (test code = PT) 13.1 s       12.0-14.7                 



[facility] CenterZnaptwrGUDEBWFKJC7481-73-02 00:20:00





             Test Item    Value        Reference Range Interpretation Comments

 

             INR (test code = INR) 1.00 1       0.85-1.17                 



[facility] C[id number] 00:20:00





             Test Item    Value        Reference Range Interpretation Comments

 

             PTT (test code = PTT) 31.5 s       22.9-35.8                 



[facility] CenterXpdgykkFKBESWKCSL6914-35-93 00:20:00





             Test Item    Value        Reference Range Interpretation Comments

 

             Segs (test code = Segs) 68.7         45.0-75.0                 



[facility] CenterKgpyowaULNAHUVSAU7506-69-27 00:20:00





             Test Item    Value        Reference Range Interpretation Comments

 

             Lymphocytes (test code = Lymphocytes) 19.6         20.0-40.0       

          



[facility] CenterGitndgxKOSJCTFDTR3956-11-45 00:20:00





             Test Item    Value        Reference Range Interpretation Comments

 

             Monocytes (test code = Monocytes) 9.4          2.0-12.0            

      



[place] 732852-04-09 00:20:00





             Test Item    Value        Reference Range Interpretation Comments

 

             Eosinophils (test code = 1.4          See_Comment                [A

utomated message] The



             Eosinophils)                                        system which ge

nerated



                                                                 this result tra

nsmitted



                                                                 reference range

: <=4.0.



                                                                 The reference r

zhen was



                                                                 not used to int

erpret



                                                                 this result as



                                                                 normal/abnormal

.



[place] 732852-04-09 00:20:00





             Test Item    Value        Reference Range Interpretation Comments

 

             Basophils (test code = 0.9          See_Comment                [Aut

omated message] The



             Basophils)                                          system which ge

nerated



                                                                 this result tra

nsmitted



                                                                 reference range

: <=1.0.



                                                                 The reference r

zhen was



                                                                 not used to int

erpret



                                                                 this result as



                                                                 normal/abnormal

.



[facility] [id number] 00:20:00





             Test Item    Value        Reference Range Interpretation Comments

 

             Neutrophils # (test code = Neutrophils 6.9          1.5-8.1        

           



             #)                                                  



[facility] CenterNpucaqvSOAFSJLBIL6353-08-02 00:20:00





             Test Item    Value        Reference Range Interpretation Comments

 

             Lymphocytes # (test code = Lymphocytes 2.0          1.0-5.5        

           



             #)                                                  



[facility] [id number] 00:20:00





             Test Item    Value        Reference Range Interpretation Comments

 

             Monocytes # (test code 1.0          See_Comment                [Aut

omated message] The



             = Monocytes #)                                        system which 

generated



                                                                 this result tra

nsmitted



                                                                 reference range

: <=0.8.



                                                                 The reference r

zhen was



                                                                 not used to int

erpret



                                                                 this result as



                                                                 normal/abnormal

.



[facility] Dall[id number] [facility]:20:00





             Test Item    Value        Reference Range Interpretation Comments

 

             Eosinophils # (test code 0.1          See_Comment                [A

utomated message] The



             = Eosinophils #)                                        system whic

h generated



                                                                 this result tra

nsmitted



                                                                 reference range

: <=0.5.



                                                                 The reference r

zhen was



                                                                 not used to int

erpret



                                                                 this result as



                                                                 normal/abnormal

.



[facility] [id number] 00:20:00





             Test Item    Value        Reference Range Interpretation Comments

 

             Basophils # (test code 0.1          See_Comment                [Aut

omated message] The



             = Basophils #)                                        system which 

generated



                                                                 this result tra

nsmitted



                                                                 reference range

: <=0.2.



                                                                 The reference r

zhen was



                                                                 not used to int

erpret



                                                                 this result as



                                                                 normal/abnormal

.



[facility] DallasQjqtzvcUCZDUMDDJL6776-79-33 00:20:00





             Test Item    Value        Reference Range Interpretation Comments

 

             C-REACTIVE PROTEIN (test code = 13.2                               

    



             C-REACTIVE PROTEIN)                                        



[id number]





             Test Item    Value        Reference Range Interpretation Comments

 

             ABO/Rh (test code = ABO/Rh) AB POS                                 



[facility] [id number] 00:20:00





             Test Item    Value        Reference Range Interpretation Comments

 

             Antibody Scrn (test Negative (48/22 7:20                          

 



             code = Antibody Scrn) PM)                                    



[facility]2022-04-09 00:20:00





             Test Item    Value        Reference Range Interpretation Comments

 

             Glucose Lvl (test code = Glucose Lvl) 74           70-99           

          



[facility]2022-04-09 00:20:00





             Test Item    Value        Reference Range Interpretation Comments

 

             BUN (test code = BUN) 15           7-22                      



[place] 924252-04-09 00:20:00





             Test Item    Value        Reference Range Interpretation Comments

 

             Creatinine Lvl (test code = Creatinine 0.61         0.50-1.40      

           



             Lvl)                                                



[facility]2022-04-09 00:20:00





             Test Item    Value        Reference Range Interpretation Comments

 

             Sodium Lvl (test code = Sodium Lvl) 139          135-145           

        



[place] 924252-04-09 00:20:00





             Test Item    Value        Reference Range Interpretation Comments

 

             Potassium Lvl (test code = Potassium 4.9          3.5-5.1          

         



             Lvl)                                                



[place] 924252-04-09 00:20:00





             Test Item    Value        Reference Range Interpretation Comments

 

             Chloride Lvl (test code = Chloride Lvl) 105                  

            



[facility]2022-04-09 00:20:00





             Test Item    Value        Reference Range Interpretation Comments

 

             CO2 (test code = CO2) 30           24-32                     



[facility]2022-04-09 00:20:00





             Test Item    Value        Reference Range Interpretation Comments

 

             Calcium Lvl (test code = Calcium Lvl) 9.5          8.5-10.5        

          



[place] 924252-04-09 00:20:00





             Test Item    Value        Reference Range Interpretation Comments

 

             AGAP (test code = AGAP) 8.9          10.0-20.0                 



[place] 924252-04-09 00:20:00





             Test Item    Value        Reference Range Interpretation Comments

 

             eGFR (test code = eGFR) 129                                    



[facility]2022-04-09 00:20:00





             Test Item    Value        Reference Range Interpretation Comments

 

             Lactic Acid Lvl (test code = Lactic 1.2          0.5-2.2           

        



             Acid Lvl)                                           



[place] 924252-04-09 00:20:00





             Test Item    Value        Reference Range Interpretation Comments

 

             Procalcitonin Lvl (test 0.09         See_Comment                [Au

tomated message]



             code = Procalcitonin Lvl)                                        Th

e system which



                                                                 generated this 

result



                                                                 transmitted ref

erence



                                                                 range: <=0.10. 

The



                                                                 reference range

 was not



                                                                 used to interpr

et this



                                                                 result as



                                                                 normal/abnormal

.



[facility]awKOXUTX[facility]CU3740[facility]





             Test Item    Value        Reference Range Interpretation Comments

 

             WBC (test code = WBC) 10.1         3.7-10.4                  



[place] 732852-04-09 00:20:00





             Test Item    Value        Reference Range Interpretation Comments

 

             RBC (test code = RBC) 5.14         4.70-6.10                 



[facility] CenterIrnhbtkPZBOSODVYK6641-45-84 [facility]:20:00





             Test Item    Value        Reference Range Interpretation Comments

 

             Hgb (test code = Hgb) 15.5         14.0-18.0                 



[place] [address]-04-09 00:20:00





             Test Item    Value        Reference Range Interpretation Comments

 

             Hct (test code = Hct) 46.5         42.0-54.0                 



[place] 732852-04-09 00:20:00





             Test Item    Value        Reference Range Interpretation Comments

 

             MCV (test code = MCV) 90.5         80.0-94.0                 



[place] 732852-04-09 00:20:00





             Test Item    Value        Reference Range Interpretation Comments

 

             MCH (test code = MCH) 30.1 pg      27.0-31.0                 



[place] 732852-04-09 00:20:00





             Test Item    Value        Reference Range Interpretation Comments

 

             MCHC (test code = MCHC) 33.3         32.0-36.0                 



[place] 732852-04-09 00:20:00





             Test Item    Value        Reference Range Interpretation Comments

 

             RDW (test code = RDW) 15.7         11.5-14.5                 



[place] 732852-04-09 00:20:00





             Test Item    Value        Reference Range Interpretation Comments

 

             Platelet (test code = Platelet) 302          133-450               

    



[place] 732852-04-09 00:20:00





             Test Item    Value        Reference Range Interpretation Comments

 

             MPV (test code = MPV) 8.9          7.4-10.4                  



[place] [address]-04-09 00:20:00





             Test Item    Value        Reference Range Interpretation Comments

 

             Sed Rate (test code = 17           See_Comment                [Auto

mated message] The



             Sed Rate)                                           system which ge

nerated this



                                                                 result transmit

huma



                                                                 reference range

: <=15. The



                                                                 reference range

 was not



                                                                 used to interpr

et this



                                                                 result as zayda

l/abnormal.



[facility] CenterTlhhntkRVNFHUTHAC7313-01-97 [facility]





             Test Item    Value        Reference Range Interpretation Comments

 

             PT (test code = PT) 13.1 s       12.0-14.7                 



[place] 732852-04-09 00:20:00





             Test Item    Value        Reference Range Interpretation Comments

 

             INR (test code = INR) 1.00 1       0.85-1.17                 



[place] 732852-04-09 00:20:00





             Test Item    Value        Reference Range Interpretation Comments

 

             PTT (test code = PTT) 31.5 s       22.9-35.8                 



[place] 732852-04-09 00:20:00





             Test Item    Value        Reference Range Interpretation Comments

 

             Segs (test code = Segs) 68.7         45.0-75.0                 



[place] [address]-04-09 00:20:00





             Test Item    Value        Reference Range Interpretation Comments

 

             Lymphocytes (test code = Lymphocytes) 19.6         20.0-40.0       

          



[place] 732852-04-09 00:20:00





             Test Item    Value        Reference Range Interpretation Comments

 

             Monocytes (test code = Monocytes) 9.4          2.0-12.0            

      



[place] 732852-04-09 00:20:00





             Test Item    Value        Reference Range Interpretation Comments

 

             Eosinophils (test code = 1.4          See_Comment                [A

utomated message] The



             Eosinophils)                                        system which ge

nerated



                                                                 this result tra

nsmitted



                                                                 reference range

: <=4.0.



                                                                 The reference r

zhen was



                                                                 not used to int

erpret



                                                                 this result as



                                                                 normal/abnormal

.



[facility] CenterMgehnehUAWWCCCZWY5768-32-05 00:20:00





             Test Item    Value        Reference Range Interpretation Comments

 

             Basophils (test code = 0.9          See_Comment                [Aut

omated message] The



             Basophils)                                          system which ge

nerated



                                                                 this result tra

nsmitted



                                                                 reference range

: <=1.0.



                                                                 The reference r

zhen was



                                                                 not used to int

erpret



                                                                 this result as



                                                                 normal/abnormal

.



[facility] CenterHhvtckhHRVNKQHNOD6465-54-07 00:20:00





             Test Item    Value        Reference Range Interpretation Comments

 

             Neutrophils # (test code = Neutrophils 6.9          1.5-8.1        

           



             #)                                                  



[facility] CenterVesnrryCAFLSQVNVK0384-64-03 00:20:00





             Test Item    Value        Reference Range Interpretation Comments

 

             Lymphocytes # (test code = Lymphocytes 2.0          1.0-5.5        

           



             #)                                                  



[facility] HospitalOpxtaitCNUCQHAJCP5747-52-10 [facility]





             Test Item    Value        Reference Range Interpretation Comments

 

             Monocytes # (test code 1.0          See_Comment                [Aut

omated message] The



             = Monocytes #)                                        system which 

generated



                                                                 this result tra

nsmitted



                                                                 reference range

: <=0.8.



                                                                 The reference r

zhen was



                                                                 not used to int

erpret



                                                                 this result as



                                                                 normal/abnormal

.



[facility] HospitalJwaydvzKJHVPXGXPQ5187-05-24 00:20:00





             Test Item    Value        Reference Range Interpretation Comments

 

             Eosinophils # (test code 0.1          See_Comment                [A

utomated message] The



             = Eosinophils #)                                        system whic

h generated



                                                                 this result tra

nsmitted



                                                                 reference range

: <=0.5.



                                                                 The reference r

zhen was



                                                                 not used to int

erpret



                                                                 this result as



                                                                 normal/abnormal

.



[facility] HospitalRwepnelJNVBMSDWLY8414-72-18 00:20:00





             Test Item    Value        Reference Range Interpretation Comments

 

             Basophils # (test code 0.1          See_Comment                [Aut

omated message] The



             = Basophils #)                                        system which 

generated



                                                                 this result tra

nsmitted



                                                                 reference range

: <=0.2.



                                                                 The reference r

zhen was



                                                                 not used to int

erpret



                                                                 this result as



                                                                 normal/abnormal

.



[facility] IilvebmRDFRQZUNLW0880-34-97 00:20:00





             Test Item    Value        Reference Range Interpretation Comments

 

             C-REACTIVE PROTEIN (test code = 13.2                               

    



             C-REACTIVE PROTEIN)                                        



[facility] [place] PWKVKLF6007-67-45 00:20:00





             Test Item    Value        Reference Range Interpretation Comments

 

             ABO/Rh (test code = ABO/Rh) AB POS                                 



[facility] [place] [id number] 00:20:00





             Test Item    Value        Reference Range Interpretation Comments

 

             Antibody Scrn (test Negative (22 7:20                          

 



             code = Antibody Scrn) PM)                                    



[employer]2022-04-09 00:20:00





             Test Item    Value        Reference Range Interpretation Comments

 

             Glucose Lvl (test code = Glucose Lvl) 74           70-99           

          



[facility] H-art ([facility])2022-04-09 00:20:00





             Test Item    Value        Reference Range Interpretation Comments

 

             BUN (test code = BUN) 15           7-22                      



[facility] H-art ([facility])2022-04-09 00:20:00





             Test Item    Value        Reference Range Interpretation Comments

 

             Creatinine Lvl (test code = Creatinine 0.61         0.50-1.40      

           



             Lvl)                                                



[place] 924252-04-09 00:20:00





             Test Item    Value        Reference Range Interpretation Comments

 

             Sodium Lvl (test code = Sodium Lvl) 139          135-145           

        



[place] 924252-04-09 00:20:00





             Test Item    Value        Reference Range Interpretation Comments

 

             Potassium Lvl (test code = Potassium 4.9          3.5-5.1          

         



             Lvl)                                                



[place] 924252-04-09 00:20:00





             Test Item    Value        Reference Range Interpretation Comments

 

             Chloride Lvl (test code = Chloride Lvl) 105                  

            



[place] 924252-04-09 00:20:00





             Test Item    Value        Reference Range Interpretation Comments

 

             CO2 (test code = CO2) 30           24-32                     



[place] 924252-04-09 00:20:00





             Test Item    Value        Reference Range Interpretation Comments

 

             Calcium Lvl (test code = Calcium Lvl) 9.5          8.5-10.5        

          



[place] 924252-04-09 00:20:00





             Test Item    Value        Reference Range Interpretation Comments

 

             AGAP (test code = AGAP) 8.9          10.0-20.0                 



[place] 924252-04-09 00:20:00





             Test Item    Value        Reference Range Interpretation Comments

 

             eGFR (test code = eGFR) 129                                    



[place] 924252-04-09 00:20:00





             Test Item    Value        Reference Range Interpretation Comments

 

             Lactic Acid Lvl (test code = Lactic 1.2          0.5-2.2           

        



             Acid Lvl)                                           



[place] 924252-04-09 00:20:00





             Test Item    Value        Reference Range Interpretation Comments

 

             Procalcitonin Lvl (test 0.09         See_Comment                [Au

tomated message]



             code = Procalcitonin Lvl)                                        

e system which



                                                                 generated this 

result



                                                                 transmitted ref

erence



                                                                 range: <=0.10. 

The



                                                                 reference range

 was not



                                                                 used to interpr

et this



                                                                 result as



                                                                 normal/abnormal

.



[place] 732852-04-09 00:20:00





             Test Item    Value        Reference Range Interpretation Comments

 

             WBC (test code = WBC) 10.1         3.7-10.4                  



[place] 732852-04-09 00:20:00





             Test Item    Value        Reference Range Interpretation Comments

 

             RBC (test code = RBC) 5.14         4.70-6.10                 



[place] 732852-04-09 00:20:00





             Test Item    Value        Reference Range Interpretation Comments

 

             Hgb (test code = Hgb) 15.5         14.0-18.0                 



[facility] CenterQcoqieqIADAWZBGKE4499-98-19 00:20:00





             Test Item    Value        Reference Range Interpretation Comments

 

             Hct (test code = Hct) 46.5         42.0-54.0                 



[facility] CenterGnctyepREEAWHPBSK1731-73-78 00:20:00





             Test Item    Value        Reference Range Interpretation Comments

 

             MCV (test code = MCV) 90.5         80.0-94.0                 



[facility] CenterPhpbewiHNDKFSVISR4076-86-26 00:20:00





             Test Item    Value        Reference Range Interpretation Comments

 

             MCH (test code = MCH) 30.1 pg      27.0-31.0                 



[facility] CenterJcjqlptMAZGOTHLLQ8080-03-54 00:20:00





             Test Item    Value        Reference Range Interpretation Comments

 

             MCHC (test code = MCHC) 33.3         32.0-36.0                 



[facility] CenterOhdprnyLPKTIGWPAW5320-89-33 00:20:00





             Test Item    Value        Reference Range Interpretation Comments

 

             RDW (test code = RDW) 15.7         11.5-14.5                 



[facility] CenterQyjxxmoVRDWZATGGG6834-07-00 00:20:00





             Test Item    Value        Reference Range Interpretation Comments

 

             Platelet (test code = Platelet) 302          133-450               

    



[facility] CenterGciyqzsGIJGWQJMXN4435-84-79 00:20:00





             Test Item    Value        Reference Range Interpretation Comments

 

             MPV (test code = MPV) 8.9          7.4-10.4                  



[facility] CenterYopcfcoUXFUSTLGTU0642-28-56 00:20:00





             Test Item    Value        Reference Range Interpretation Comments

 

             Sed Rate (test code = 17           See_Comment                [Auto

mated message] The



             Sed Rate)                                           system which ge

nerated this



                                                                 result transmit

huma



                                                                 reference range

: <=15. The



                                                                 reference range

 was not



                                                                 used to interpr

et this



                                                                 result as zayda

l/abnormal.



[facility] CenterGsrtqusBOEZCUZUEV7963-33-77 00:20:00





             Test Item    Value        Reference Range Interpretation Comments

 

             PT (test code = PT) 13.1 s       12.0-14.7                 



[facility] CenterDkyeuaqEWYGOZJTPB6075-24-85 00:20:00





             Test Item    Value        Reference Range Interpretation Comments

 

             INR (test code = INR) 1.00 1       0.85-1.17                 



[facility] CenterZmozqahQMMZNREJLH7833-73-35 00:20:00





             Test Item    Value        Reference Range Interpretation Comments

 

             PTT (test code = PTT) 31.5 s       22.9-35.8                 



[facility] CenterJejveamOIYHHVDSLM3423-70-87 00:20:00





             Test Item    Value        Reference Range Interpretation Comments

 

             Segs (test code = Segs) 68.7         45.0-75.0                 



[facility] CenterMxjvpavUTRDPVEBNK4120-78-65 00:20:00





             Test Item    Value        Reference Range Interpretation Comments

 

             Lymphocytes (test code = Lymphocytes) 19.6         20.0-40.0       

          



[place] 732852-04-09 00:20:00





             Test Item    Value        Reference Range Interpretation Comments

 

             Monocytes (test code = Monocytes) 9.4          2.0-12.0            

      



[place] 732852-04-09 00:20:00





             Test Item    Value        Reference Range Interpretation Comments

 

             Eosinophils (test code = 1.4          See_Comment                [A

utomated message] The



             Eosinophils)                                        system which ge

nerated



                                                                 this result tra

nsmitted



                                                                 reference range

: <=4.0.



                                                                 The reference r

zhen was



                                                                 not used to int

erpret



                                                                 this result as



                                                                 normal/abnormal

.



[facility] CenterQlijlcnHFCVLTHYRP8404-23-14 00:20:00





             Test Item    Value        Reference Range Interpretation Comments

 

             Basophils (test code = 0.9          See_Comment                [Aut

omated message] The



             Basophils)                                          system which ge

nerated



                                                                 this result tra

nsmitted



                                                                 reference range

: <=1.0.



                                                                 The reference r

zhen was



                                                                 not used to int

erpret



                                                                 this result as



                                                                 normal/abnormal

.



[facility] CenterQvladagNOOXLULHHF4946-35-33 00:20:00





             Test Item    Value        Reference Range Interpretation Comments

 

             Neutrophils # (test code = Neutrophils 6.9          1.5-8.1        

           



             #)                                                  



[place] [id number] 00:20:00





             Test Item    Value        Reference Range Interpretation Comments

 

             Lymphocytes # (test code = Lymphocytes 2.0          1.0-5.5        

           



             #)                                                  



[place] 732852-04-09 00:20:00





             Test Item    Value        Reference Range Interpretation Comments

 

             Monocytes # (test code 1.0          See_Comment                [Aut

omated message] The



             = Monocytes #)                                        system which 

generated



                                                                 this result tra

nsmitted



                                                                 reference range

: <=0.8.



                                                                 The reference r

zhen was



                                                                 not used to int

erpret



                                                                 this result as



                                                                 normal/abnormal

.



[place] 732852-04-09 00:20:00





             Test Item    Value        Reference Range Interpretation Comments

 

             Eosinophils # (test code 0.1          See_Comment                [A

utomated message] The



             = Eosinophils #)                                        system whic

h generated



                                                                 this result tra

nsmitted



                                                                 reference range

: <=0.5.



                                                                 The reference r

zhen was



                                                                 not used to int

erpret



                                                                 this result as



                                                                 normal/abnormal

.



[facility]





             Test Item    Value        Reference Range Interpretation Comments

 

             Basophils # (test code 0.1          See_Comment                [Aut

omated message] The



             = Basophils #)                                        system which 

generated



                                                                 this result tra

nsmitted



                                                                 reference range

: <=0.2.



                                                                 The reference r

zhen was



                                                                 not used to int

erpret



                                                                 this result as



                                                                 normal/abnormal

.



[facility]





             Test Item    Value        Reference Range Interpretation Comments

 

             C-REACTIVE PROTEIN (test code = 13.2                               

    



             C-REACTIVE PROTEIN)                                        



[facility] WITH [id number] 04:47:32





             Test Item    Value        Reference Range Interpretation Comments

 

             WBC (test code =              See_Comment  H             [Automated



             0790-2)                                             message] The sy

stem



                                                                 which generated



                                                                 this result



                                                                 transmitted



                                                                 reference range

:



                                                                 4.20 - 10.70



                                                                 10*3/?L. The



                                                                 reference range

 was



                                                                 not used to



                                                                 interpret this



                                                                 result as



                                                                 normal/abnormal

.

 

             RBC (test code =              See_Comment                [Automated



             789-8)                                              message] The sy

stem



                                                                 which generated



                                                                 this result



                                                                 transmitted



                                                                 reference range

:



                                                                 4.26 - 5.52



                                                                 10*6/?L. The



                                                                 reference range

 was



                                                                 not used to



                                                                 interpret this



                                                                 result as



                                                                 normal/abnormal

.

 

             HGB (test code = 16.1 g/dL    12.2-16.4                 



             718-7)                                              

 

             HCT (test code = 47.2 %       38.4-49.3                 



             4544-3)                                             

 

             MCV (test code = 86.1 fL      81.7-95.6                 



             787-2)                                              

 

             MCH (test code = 29.4 pg      26.1-32.7                 



             785-6)                                              

 

             MCHC (test code = 34.1 g/dL    31.2-35.0                 



             786-4)                                              

 

             RDW-SD (test code = 45.7 fL      38.5-51.6                 



             59413-5)                                            

 

             RDW-CV (test code = 14.6 %       12.1-15.4                 



             788-0)                                              

 

             PLT (test code =              See_Comment                [Automated



             777-3)                                              message] The sy

stem



                                                                 which generated



                                                                 this result



                                                                 transmitted



                                                                 reference range

:



                                                                 150 - 328 10*3/

?L.



                                                                 The reference r

zhen



                                                                 was not used to



                                                                 interpret this



                                                                 result as



                                                                 normal/abnormal

.

 

             MPV (test code = 11.0 fL      9.8-13.0                  



             69201-9)                                            

 

             NRBC/100 WBC (test              See_Comment                [Automat

ed



             code = 2456932230)                                        message] 

The system



                                                                 which generated



                                                                 this result



                                                                 transmitted



                                                                 reference range

:



                                                                 0.0 - 10.0 /100



                                                                 WBCs. The refer

ence



                                                                 range was not u

sed



                                                                 to interpret th

is



                                                                 result as



                                                                 normal/abnormal

.

 

             NRBC x10^3 (test code <0.01        See_Comment                [Auto

mated



             = [id number])                                        message] The s

ystem



                                                                 which generated



                                                                 this result



                                                                 transmitted



                                                                 reference range

:



                                                                 10*3/?L. The



                                                                 reference range

 was



                                                                 not used to



                                                                 interpret this



                                                                 result as



                                                                 normal/abnormal

.

 

             GRAN MAT (NEUT) % 72.2 %                                 



             (test code = 770-8)                                        

 

             IMM GRAN % (test code 0.60 %                                 



             = [id number])                                        

 

             LYMPH % (test code = 17.7 %                                 



             736-9)                                              

 

             MONO % (test code = 7.4 %                                  



             5905-5)                                             

 

             EOS % (test code = 1.8 %                                  



             713-8)                                              

 

             BASO % (test code = 0.3 %                                  



             706-2)                                              

 

             GRAN MAT x10^3(ANC) 9.09 10*3/uL 1.99-6.95    H            



             (test code =                                        



             9383246185)                                         

 

             IMM GRAN x10^3 (test 0.07 10*3/uL 0.00-0.06    H            



             code = 7475520417)                                        

 

             LYMPH x10^3 (test code 2.22 10*3/uL 1.09-3.23                 



             = 731-0)                                            

 

             MONO x10^3 (test code 0.93 10*3/uL 0.36-1.02                 



             = 742-7)                                            

 

             EOS x10^3 (test code = 0.22 10*3/uL 0.06-0.53                 



             711-2)                                              

 

             BASO x10^3 (test code 0.04 10*3/uL 0.01-0.09                 



             = 704-7)                                            

 

             Lab Interpretation Abnormal                               



             (test code = 72134-8)                                        



[facility]. METABOLIC PANEL ([id number])2022 
04:36:41





             Test Item    Value        Reference Range Interpretation Comments

 

             NA (test code = 138 mmol/L   135-145                   



             0970871054)                                         

 

             K (test code = 4.6 mmol/L   3.5-5.0                   



             8474780167)                                         

 

             CL (test code = 105 mmol/L                       



             8211869459)                                         

 

             CO2 TOTAL (test code = 21 mmol/L    23-31        L            



             7662489603)                                         

 

             AGAP (test code =              2-16                      



             3398831021)                                         

 

             BUN (test code = 13 mg/dL     7-23                      



             8966133301)                                         

 

             GLUCOSE (test code = 167 mg/dL           H            



             7573108797)                                         

 

             CREATININE (test code = 0.48 mg/dL   0.60-1.25    L            



             1738680718)                                         

 

             TOTAL BILI (test code = 0.8 mg/dL    0.1-1.1                   



             2587138804)                                         

 

             CALCIUM (test code = 9.3 mg/dL    8.6-10.6                  



             0687037268)                                         

 

             T PROTEIN (test code = 7.6 g/dL     6.3-8.2                   



             1300536519)                                         

 

             ALBUMIN (test code = 4.2 g/dL     3.5-5.0                   



             6655012759)                                         

 

             ALK PHOS (test code = 98 U/L                           



             4546180159)                                         

 

             ALTv (test code = 71 U/L       5-50         H            



             1742-6)                                             

 

             AST(SGOT) (test code = 36 U/L       13-40                     



             9999487082)                                         

 

             eGFR (test code =              mL/min/1.73m2              



             7221510805)                                         

 

             MAL (test code = MAL) Association of                           



                          Glomerular Filtration                           



                          Rate (GFR) and Staging                           



                          of Kidney Disease*                           



                          +---------------------                           



                          --+-------------------                           



                          --+-------------------                           



                          ------+| GFR                           



                          (mL/min/1.73 m2) ?|                           



                          With Kidney Damage ?|                           



                          ?Without Kidney                           



                          Damage+---------------                           



                          --------+-------------                           



                          --------+-------------                           



                          ------------+| ?>90 ?                           



                          ? ? ? ? ? ? ? ?|                           



                          ?Stage one ? ? ? ? ?|                           



                          ? Normal ? ? ? ? ? ? ?                           



                          ?+--------------------                           



                          ---+------------------                           



                          ---+------------------                           



                          -------+| ?60-89 ? ? ?                           



                          ? ? ? ? ?| ?Stage two                           



                          ? ? ? ? ?| ? Decreased                           



                          GFR ? ? ? ?                            



                          +---------------------                           



                          --+-------------------                           



                          --+-------------------                           



                          ------+| ?30-59 ? ? ?                           



                          ? ? ? ? ?| ?Stage                           



                          three ? ? ? ?| ? Stage                           



                          three ? ? ? ? ?                           



                          +---------------------                           



                          --+-------------------                           



                          --+-------------------                           



                          ------+| ?15-29 ? ? ?                           



                          ? ? ? ? ?| ?Stage four                           



                          ? ? ? ? | ? Stage four                           



                          ? ? ? ? ?                              



                          ?+--------------------                           



                          ---+------------------                           



                          ---+------------------                           



                          -------+| ?<15 (or                           



                          dialysis) ? ?| ?Stage                           



                          five ? ? ? ? | ? Stage                           



                          five ? ? ? ? ?                           



                          ?+--------------------                           



                          ---+------------------                           



                          ---+------------------                           



                          -------+ *Each stage                           



                          assumes the associated                           



                          GFR level has been in                           



                          effect for at least                           



                          three months. ?Stages                           



                          1 to 5, with or                           



                          without kidney                           



                          disease, indicate                           



                          chronic kidney                           



                          disease. Notes:                           



                          Determination of                           



                          stages one and two                           



                          (with eGFR                             



                          >59mL/min/1.73 m2)                           



                          requires estimation of                           



                          kidney damage for at                           



                          least three months as                           



                          defined by structural                           



                          or functional                           



                          abnormalities of the                           



                          kidney, manifested by                           



                          either:Pathological                           



                          abnormalities or                           



                          Markers of kidney                           



                          damage (including                           



                          abnormalities in the                           



                          composition of the                           



                          blood or urine or                           



                          abnormalities in                           



                          imaging tests).                           

 

             Lab Interpretation Abnormal                               



             (test code = 21226-3)                                        



[facility]





             Test Item    Value        Reference Range Interpretation Comments

 

             MAGNESIUM (test code = 1197058239) 1.6 mg/dL    1.7-2.4      L     

       

 

             Lab Interpretation (test code = Abnormal                           

    



             95081-8)                                            



[id number]





             Test Item    Value        Reference Range Interpretation Comments

 

             ABO/Rh (test code = ABO/Rh) AB POS                                 



[facility] [id number] 07:52:00





             Test Item    Value        Reference Range Interpretation Comments

 

             Antibody Scrn (test Negative (3/28/22 2:52                         

  



             code = Antibody Scrn) AM)                                    



[id number]52[facility]





             Test Item    Value        Reference Range Interpretation Comments

 

             Glucose Lvl (test code = Glucose Lvl) 291          70-99           

          



[facility] [id number] 07:52:00





             Test Item    Value        Reference Range Interpretation Comments

 

             BUN (test code = BUN) 16           7-22                      



[facility] [id number] 07:52:00





             Test Item    Value        Reference Range Interpretation Comments

 

             Creatinine Lvl (test code = Creatinine 0.83         0.50-1.40      

           



             Lvl)                                                



[facility] [id number] 07:52:00





             Test Item    Value        Reference Range Interpretation Comments

 

             Sodium Lvl (test code = Sodium Lvl) 138          135-145           

        



[facility] [id number] 07:52:00





             Test Item    Value        Reference Range Interpretation Comments

 

             Potassium Lvl (test code = Potassium 4.7          3.5-5.1          

         



             Lvl)                                                



[facility] [id number] 07:52:00





             Test Item    Value        Reference Range Interpretation Comments

 

             Chloride Lvl (test code = Chloride Lvl) 113                  

            



[facility]2022-03-28 07:52:00





             Test Item    Value        Reference Range Interpretation Comments

 

             CO2 (test code = CO2) 18           24-32                     



[place] 924252-03-28 07:52:00





             Test Item    Value        Reference Range Interpretation Comments

 

             AGAP (test code = AGAP) 11.7         10.0-20.0                 



[place] 924252-03-28 07:52:00





             Test Item    Value        Reference Range Interpretation Comments

 

             Calcium Lvl (test code = Calcium Lvl) 9.4          8.5-10.5        

          



[place] 924252-03-28 07:52:00





             Test Item    Value        Reference Range Interpretation Comments

 

             eGFR (test code = eGFR) 113                                    



[place] 732852-03-28 07:52:00





             Test Item    Value        Reference Range Interpretation Comments

 

             WBC (test code = WBC) 5.5          3.7-10.4                  



[place] 732852-03-28 07:52:00





             Test Item    Value        Reference Range Interpretation Comments

 

             RBC (test code = RBC) 5.53         4.70-6.10                 



[place] 732852-03-28 07:52:00





             Test Item    Value        Reference Range Interpretation Comments

 

             Hgb (test code = Hgb) 16.3         14.0-18.0                 



[place] 732852-03-28 07:52:00





             Test Item    Value        Reference Range Interpretation Comments

 

             Hct (test code = Hct) 50.5         42.0-54.0                 



[place] 732852-03-28 07:52:00





             Test Item    Value        Reference Range Interpretation Comments

 

             MCV (test code = MCV) 91.3         80.0-94.0                 



[place] 732852-03-28 07:52:00





             Test Item    Value        Reference Range Interpretation Comments

 

             MCH (test code = MCH) 29.5 pg      27.0-31.0                 



[place] 732852-03-28 07:52:00





             Test Item    Value        Reference Range Interpretation Comments

 

             MCHC (test code = MCHC) 32.3         32.0-36.0                 



[place] 732852-03-28 07:52:00





             Test Item    Value        Reference Range Interpretation Comments

 

             RDW (test code = RDW) 15.4         11.5-14.5                 



[place] 732852-03-28 07:52:00





             Test Item    Value        Reference Range Interpretation Comments

 

             Platelet (test code = Platelet) 210          133-450               

    



[id number] 07:52:00





             Test Item    Value        Reference Range Interpretation Comments

 

             MPV (test code = MPV) 9.3          7.4-10.4                  



[facility] [id number] 07:52:00





             Test Item    Value        Reference Range Interpretation Comments

 

             ABO/Rh (test code = ABO/Rh) AB POS                                 



[facility] [id number] 07:52:00





             Test Item    Value        Reference Range Interpretation Comments

 

             Antibody Scrn (test Negative (3/28/22 2:52                         

  



             code = Antibody Scrn) AM)                                    



[facility] [employer] [id number] 07:52:00





             Test Item    Value        Reference Range Interpretation Comments

 

             Glucose Lvl (test code = Glucose Lvl) 291          70-99           

          



[facility] [employer] [id number] 07:52:00





             Test Item    Value        Reference Range Interpretation Comments

 

             BUN (test code = BUN) 16           -                      



[facility] [employer] [id number] 07:52:00





             Test Item    Value        Reference Range Interpretation Comments

 

             Creatinine Lvl (test code = Creatinine 0.83         0.50-1.40      

           



             Lvl)                                                



[facility] [employer] [id number] 07:52:00





             Test Item    Value        Reference Range Interpretation Comments

 

             Sodium Lvl (test code = Sodium Lvl) 138          135-145           

        



[facility] [employer] [id number] 07:52:00





             Test Item    Value        Reference Range Interpretation Comments

 

             Potassium Lvl (test code = Potassium 4.7          3.5-5.1          

         



             Lvl)                                                



[facility] [employer] [id number] 07:52:00





             Test Item    Value        Reference Range Interpretation Comments

 

             Chloride Lvl (test code = Chloride Lvl) 113                  

            



[facility] [employer] [id number] 07:52:00





             Test Item    Value        Reference Range Interpretation Comments

 

             CO2 (test code = CO2) 18           -32                     



[facility] [employer] [id number] 07:52:00





             Test Item    Value        Reference Range Interpretation Comments

 

             AGAP (test code = AGAP) 11.7         10.0-20.0                 



[facility] [employer] [id number] 07:52:00





             Test Item    Value        Reference Range Interpretation Comments

 

             Calcium Lvl (test code = Calcium Lvl) 9.4          8.5-10.5        

          



[facility] [id number] 07:52:00





             Test Item    Value        Reference Range Interpretation Comments

 

             eGFR (test code = eGFR) 113                                    



[facility]TF6772[facility]63-87 07[facility]52:00





             Test Item    Value        Reference Range Interpretation Comments

 

             WBC (test code = WBC) 5.5          3.7-10.4                  



[facility] CenterKrjbeseSDNKAAPFRS3064-97-44 07:52:00





             Test Item    Value        Reference Range Interpretation Comments

 

             RBC (test code = RBC) 5.53         4.70-6.10                 



[facility] CenterFjwwypyTEDKXBEJOC7908-71-59 07:52:00





             Test Item    Value        Reference Range Interpretation Comments

 

             Hgb (test code = Hgb) 16.3         14.0-18.0                 



[facility] CenterUqwaxtpZTMXUXCESE9063-31-46 07:52:00





             Test Item    Value        Reference Range Interpretation Comments

 

             Hct (test code = Hct) 50.5         42.0-54.0                 



[facility] CenterVvqfvucZGIZEUEZMC9720-82-53 07:52:00





             Test Item    Value        Reference Range Interpretation Comments

 

             MCV (test code = MCV) 91.3         80.0-94.0                 



[facility] CenterKcndwoyTYURIELGTB7411-20-35 07:52:00





             Test Item    Value        Reference Range Interpretation Comments

 

             MCH (test code = MCH) 29.5 pg      27.0-31.0                 



[facility] CenterWjxwafyNKFIQCQAHV4491-74-56 07:52:00





             Test Item    Value        Reference Range Interpretation Comments

 

             MCHC (test code = MCHC) 32.3         32.0-36.0                 



[facility] CenterJuwcujqAIKTFUIWPI3403-67-41 07:52:00





             Test Item    Value        Reference Range Interpretation Comments

 

             RDW (test code = RDW) 15.4         11.5-14.5                 



[facility] CenterSqlqvacQWIIUZBDMJ3588-92-96 07:52:00





             Test Item    Value        Reference Range Interpretation Comments

 

             Platelet (test code = Platelet) 210          133-450               

    



[facility] CenterQlsahtrUJGFVPIGHC1992-00-94 07:52:00





             Test Item    Value        Reference Range Interpretation Comments

 

             MPV (test code = MPV) 9.3          7.4-10.4                  



[facility] [id number] 07:52:00





             Test Item    Value        Reference Range Interpretation Comments

 

             ABO/Rh (test code = ABO/Rh) AB POS                                 



[facility] [employer] [id number] 07:52:00





             Test Item    Value        Reference Range Interpretation Comments

 

             Antibody Scrn (test Negative (3/28/22 2:52                         

  



             code = Antibody Scrn) AM)                                    



[place] 924252-03-28 07:52:00





             Test Item    Value        Reference Range Interpretation Comments

 

             Glucose Lvl (test code = Glucose Lvl) 291          70-99           

          



[place] 924252-03-28 07:52:00





             Test Item    Value        Reference Range Interpretation Comments

 

             BUN (test code = BUN) 16           7-22                      



[place] 924252-03-28 07:52:00





             Test Item    Value        Reference Range Interpretation Comments

 

             Creatinine Lvl (test code = Creatinine 0.83         0.50-1.40      

           



             Lvl)                                                



[place] 924252-03-28 07:52:00





             Test Item    Value        Reference Range Interpretation Comments

 

             Sodium Lvl (test code = Sodium Lvl) 138          135-145           

        



[place] 924252-03-28 07:52:00





             Test Item    Value        Reference Range Interpretation Comments

 

             Potassium Lvl (test code = Potassium 4.7          3.5-5.1          

         



             Lvl)                                                



[place] 924252-03-28 07:52:00





             Test Item    Value        Reference Range Interpretation Comments

 

             Chloride Lvl (test code = Chloride Lvl) 113                  

            



[place] 924252-03-28 07:52:00





             Test Item    Value        Reference Range Interpretation Comments

 

             CO2 (test code = CO2) 18           24-32                     



[place] 924252-03-28 07:52:00





             Test Item    Value        Reference Range Interpretation Comments

 

             AGAP (test code = AGAP) 11.7         10.0-20.0                 



[place] 924252-03-28 07:52:00





             Test Item    Value        Reference Range Interpretation Comments

 

             Calcium Lvl (test code = Calcium Lvl) 9.4          8.5-10.5        

          



[place] 924252-03-28 07:52:00





             Test Item    Value        Reference Range Interpretation Comments

 

             eGFR (test code = eGFR) 113                                    



[place] 732852-03-28 07:52:00





             Test Item    Value        Reference Range Interpretation Comments

 

             WBC (test code = WBC) 5.5          3.7-10.4                  



[place] 732852-03-28 07:52:00





             Test Item    Value        Reference Range Interpretation Comments

 

             RBC (test code = RBC) 5.53         4.70-6.10                 



[place] 732852-03-28 07:52:00





             Test Item    Value        Reference Range Interpretation Comments

 

             Hgb (test code = Hgb) 16.3         14.0-18.0                 



[facility] DallasJhgfiepRKSBZNQRIX1006-91-72 07:52:00





             Test Item    Value        Reference Range Interpretation Comments

 

             Hct (test code = Hct) 50.5         42.0-54.0                 



[facility] DallasSxjhnsjCUZIQKBZLM9796-40-37 07:52:00





             Test Item    Value        Reference Range Interpretation Comments

 

             MCV (test code = MCV) 91.3         80.0-94.0                 



[facility] HospitalIxwfxddZRGBHDFEFK5639-21-50 07:52:00





             Test Item    Value        Reference Range Interpretation Comments

 

             MCH (test code = MCH) 29.5 pg      27.0-31.0                 



[facility] HospitalOvezdoxCAYTNMMKHP5193-90-25 07:52:00





             Test Item    Value        Reference Range Interpretation Comments

 

             MCHC (test code = MCHC) 32.3         32.0-36.0                 



[facility] HospitalWiprvkgQAUNJNFYAT7122-20-59 07:52:00





             Test Item    Value        Reference Range Interpretation Comments

 

             RDW (test code = RDW) 15.4         11.5-14.5                 



[facility] DallasTungpmpOILQRGTRWG4549-48-47 07:52:00





             Test Item    Value        Reference Range Interpretation Comments

 

             Platelet (test code = Platelet) 210          133-450               

    



[facility] HospitalUtndorqDBRQYUCSCG3845-82-09 07:52:00





             Test Item    Value        Reference Range Interpretation Comments

 

             MPV (test code = MPV) 9.3          7.4-10.4                  



[facility] [employer] [id number] 07:52:00





             Test Item    Value        Reference Range Interpretation Comments

 

             ABO/Rh (test code = ABO/Rh) AB POS                                 



[facility] [employer] [id number] 07:52:00





             Test Item    Value        Reference Range Interpretation Comments

 

             Antibody Scrn (test Negative (3/28/22 2:52                         

  



             code = Antibody Scrn) AM)                                    



[facility] [employer] [id number] 07:52:00





             Test Item    Value        Reference Range Interpretation Comments

 

             Glucose Lvl (test code = Glucose Lvl) 291          70-99           

          



[facility] [id number] 07:52:00





             Test Item    Value        Reference Range Interpretation Comments

 

             BUN (test code = BUN) 16           7-                      



[facility] [employer] [id number] 07:52:00





             Test Item    Value        Reference Range Interpretation Comments

 

             Creatinine Lvl (test code = Creatinine 0.83         0.50-1.40      

           



             Lvl)                                                



[place] 924252-03-28 07:52:00





             Test Item    Value        Reference Range Interpretation Comments

 

             Sodium Lvl (test code = Sodium Lvl) 138          135-145           

        



[place] 924252-03-28 07:52:00





             Test Item    Value        Reference Range Interpretation Comments

 

             Potassium Lvl (test code = Potassium 4.7          3.5-5.1          

         



             Lvl)                                                



[place] 924252-03-28 07:52:00





             Test Item    Value        Reference Range Interpretation Comments

 

             Chloride Lvl (test code = Chloride Lvl) 113                  

            



[place] 924252-03-28 07:52:00





             Test Item    Value        Reference Range Interpretation Comments

 

             CO2 (test code = CO2) 18           24-32                     



[place] 924252-03-28 07:52:00





             Test Item    Value        Reference Range Interpretation Comments

 

             AGAP (test code = AGAP) 11.7         10.0-20.0                 



[place] 924252-03-28 07:52:00





             Test Item    Value        Reference Range Interpretation Comments

 

             Calcium Lvl (test code = Calcium Lvl) 9.4          8.5-10.5        

          



[place] 924252-03-28 07:52:00





             Test Item    Value        Reference Range Interpretation Comments

 

             eGFR (test code = eGFR) 113                                    



[place] 732852-03-28 07:52:00





             Test Item    Value        Reference Range Interpretation Comments

 

             WBC (test code = WBC) 5.5          3.7-10.4                  



[place] 732852-03-28 07:52:00





             Test Item    Value        Reference Range Interpretation Comments

 

             RBC (test code = RBC) 5.53         4.70-6.10                 



[place] 94684-03-28 07:52:00





             Test Item    Value        Reference Range Interpretation Comments

 

             Hgb (test code = Hgb) 16.3         14.0-18.0                 



[place] 94684-03-28 07:52:00





             Test Item    Value        Reference Range Interpretation Comments

 

             Hct (test code = Hct) 50.5         42.0-54.0                 



[place] 94684-03-28 07:52:00





             Test Item    Value        Reference Range Interpretation Comments

 

             MCV (test code = MCV) 91.3         80.0-94.0                 



[place] 94684-03-28 07:52:00





             Test Item    Value        Reference Range Interpretation Comments

 

             MCH (test code = MCH) 29.5 pg      27.0-31.0                 



[facility] DallasTbzqfpbBZWFXVTCKI1068-27-17 07:52:00





             Test Item    Value        Reference Range Interpretation Comments

 

             MCHC (test code = MCHC) 32.3         32.0-36.0                 



[facility] DallasNrtmsgiVGHJLANMHB5112-73-30 07:52:00





             Test Item    Value        Reference Range Interpretation Comments

 

             RDW (test code = RDW) 15.4         11.5-14.5                 



[facility] HospitalUagccviYQAVMZTEWK3248-68-86 07:52:00





             Test Item    Value        Reference Range Interpretation Comments

 

             Platelet (test code = Platelet) 210          133-450               

    



[facility] HospitalTleaoqaKNUYVXTGOW6732-08-45 07:52:00





             Test Item    Value        Reference Range Interpretation Comments

 

             MPV (test code = MPV) 9.3          7.4-10.4                  



[facility] [employer] [id number] 07:52:00





             Test Item    Value        Reference Range Interpretation Comments

 

             ABO/Rh (test code = ABO/Rh) AB POS                                 



[facility] [employer] [id number] 07:52:00





             Test Item    Value        Reference Range Interpretation Comments

 

             Antibody Scrn (test Negative (3/28/22 2:52                         

  



             code = Antibody Scrn) AM)                                    



[facility] [employer] [id number] 07:52:00





             Test Item    Value        Reference Range Interpretation Comments

 

             Glucose Lvl (test code = Glucose Lvl) 291          70-99           

          



[facility] [employer] [id number] 07:52:00





             Test Item    Value        Reference Range Interpretation Comments

 

             BUN (test code = BUN) 16           7-22                      



[facility] [employer] [id number] 07:52:00





             Test Item    Value        Reference Range Interpretation Comments

 

             Creatinine Lvl (test code = Creatinine 0.83         0.50-1.40      

           



             Lvl)                                                



[facility] H-art (WPP)2022-03-28 07:52:00





             Test Item    Value        Reference Range Interpretation Comments

 

             Sodium Lvl (test code = Sodium Lvl) 138          135-145           

        



[facility] [employer] [id number] 07:52:00





             Test Item    Value        Reference Range Interpretation Comments

 

             Potassium Lvl (test code = Potassium 4.7          3.5-5.1          

         



             Lvl)                                                



[facility] [employer] [id number] 07:52:00





             Test Item    Value        Reference Range Interpretation Comments

 

             Chloride Lvl (test code = Chloride Lvl) 113                  

            



[facility]2022-03-28 07:52:00





             Test Item    Value        Reference Range Interpretation Comments

 

             CO2 (test code = CO2) 18           24-32                     



[place] 924252-03-28 07:52:00





             Test Item    Value        Reference Range Interpretation Comments

 

             AGAP (test code = AGAP) 11.7         10.0-20.0                 



[place] 924252-03-28 07:52:00





             Test Item    Value        Reference Range Interpretation Comments

 

             Calcium Lvl (test code = Calcium Lvl) 9.4          8.5-10.5        

          



[facility]2022-03-28 07:52:00





             Test Item    Value        Reference Range Interpretation Comments

 

             eGFR (test code = eGFR) 113                                    



[place] 732852-03-28 07:52:00





             Test Item    Value        Reference Range Interpretation Comments

 

             WBC (test code = WBC) 5.5          3.7-10.4                  



[place] 732852-03-28 07:52:00





             Test Item    Value        Reference Range Interpretation Comments

 

             RBC (test code = RBC) 5.53         4.70-6.10                 



[place] 732852-03-28 07:52:00





             Test Item    Value        Reference Range Interpretation Comments

 

             Hgb (test code = Hgb) 16.3         14.0-18.0                 



[place] 732852-03-28 07:52:00





             Test Item    Value        Reference Range Interpretation Comments

 

             Hct (test code = Hct) 50.5         42.0-54.0                 



[place] 732852-03-28 07:52:00





             Test Item    Value        Reference Range Interpretation Comments

 

             MCV (test code = MCV) 91.3         80.0-94.0                 



[place] 732852-03-28 07:52:00





             Test Item    Value        Reference Range Interpretation Comments

 

             MCH (test code = MCH) 29.5 pg      27.0-31.0                 



[place] 732852-03-28 07:52:00





             Test Item    Value        Reference Range Interpretation Comments

 

             MCHC (test code = MCHC) 32.3         32.0-36.0                 



[place] 732852-03-28 07:52:00





             Test Item    Value        Reference Range Interpretation Comments

 

             RDW (test code = RDW) 15.4         11.5-14.5                 



[place] 732852-03-28 07:52:00





             Test Item    Value        Reference Range Interpretation Comments

 

             Platelet (test code = Platelet) 210          133-450               

    



[facility]ufvtykUXSGAPMURM8509-66-80 07:52:00





             Test Item    Value        Reference Range Interpretation Comments

 

             MPV (test code = MPV) 9.3          7.4-10.4                  



[facility] [employer] [id number] 07:52:00





             Test Item    Value        Reference Range Interpretation Comments

 

             ABO/Rh (test code = ABO/Rh) AB POS                                 



[facility] [employer] [id number] 07:52:00





             Test Item    Value        Reference Range Interpretation Comments

 

             Antibody Scrn (test Negative (3/28/22 2:52                         

  



             code = Antibody Scrn) AM)                                    



[facility] [employer] [id number] 07:52:00





             Test Item    Value        Reference Range Interpretation Comments

 

             Glucose Lvl (test code = Glucose Lvl) 291          70-99           

          



[facility] [employer] [id number] 07:52:00





             Test Item    Value        Reference Range Interpretation Comments

 

             BUN (test code = BUN) 16           -                      



[facility] [employer] [id number] 07:52:00





             Test Item    Value        Reference Range Interpretation Comments

 

             Creatinine Lvl (test code = Creatinine 0.83         0.50-1.40      

           



             Lvl)                                                



[facility] [employer] [id number] 07:52:00





             Test Item    Value        Reference Range Interpretation Comments

 

             Sodium Lvl (test code = Sodium Lvl) 138          135-145           

        



[facility] [employer] [id number] 07:52:00





             Test Item    Value        Reference Range Interpretation Comments

 

             Potassium Lvl (test code = Potassium 4.7          3.5-5.1          

         



             Lvl)                                                



[facility] [employer] [id number] 07:52:00





             Test Item    Value        Reference Range Interpretation Comments

 

             Chloride Lvl (test code = Chloride Lvl) 113                  

            



[facility] [employer] [id number] 07:52:00





             Test Item    Value        Reference Range Interpretation Comments

 

             CO2 (test code = CO2) 18           -32                     



[facility] [employer] [id number] 07:52:00





             Test Item    Value        Reference Range Interpretation Comments

 

             AGAP (test code = AGAP) 11.7         10.0-20.0                 



[facility] [employer] [id number] 07:52:00





             Test Item    Value        Reference Range Interpretation Comments

 

             Calcium Lvl (test code = Calcium Lvl) 9.4          8.5-10.5        

          



[facility] [id number] 07:52:00





             Test Item    Value        Reference Range Interpretation Comments

 

             eGFR (test code = eGFR) 113                                    



[facility] 07[facility]52:00





             Test Item    Value        Reference Range Interpretation Comments

 

             WBC (test code = WBC) 5.5          3.7-10.4                  



[facility] CenterXxyluhiGRPQEPVMFT6384-79-55 07:52:00





             Test Item    Value        Reference Range Interpretation Comments

 

             RBC (test code = RBC) 5.53         4.70-6.10                 



[facility]KwwgpqwXMUXTHCWRH6262-[facility]-00 07:52:00





             Test Item    Value        Reference Range Interpretation Comments

 

             Hgb (test code = Hgb) 16.3         14.0-18.0                 



[facility] CenterWwpzdzfKQOKUGIXUF4031-43-82 07:52:00





             Test Item    Value        Reference Range Interpretation Comments

 

             Hct (test code = Hct) 50.5         42.0-54.0                 



[facility] CenterIuebmrcCVGMXNOENJ3116-05-01 07:52:00





             Test Item    Value        Reference Range Interpretation Comments

 

             MCV (test code = MCV) 91.3         80.0-94.0                 



[facility] 07:52:00





             Test Item    Value        Reference Range Interpretation Comments

 

             MCH (test code = MCH) 29.5 pg      27.0-31.0                 



[facility] CenterTxbrippFUDIQPRGCX7501-63-44 07:52:00





             Test Item    Value        Reference Range Interpretation Comments

 

             MCHC (test code = MCHC) 32.3         32.0-36.0                 



[facility] CenterEgrjojbPMLGQSCCFK2244-15-94 07:52:00





             Test Item    Value        Reference Range Interpretation Comments

 

             RDW (test code = RDW) 15.4         11.5-14.5                 



[facility] CenterVchunydEDREJPNXZL4798-44-10 07:52:00





             Test Item    Value        Reference Range Interpretation Comments

 

             Platelet (test code = Platelet) 210          133-450               

    



[facility]GhtwxjwCUEHXFATPJ0949-64-58 07:52:00





             Test Item    Value        Reference Range Interpretation Comments

 

             MPV (test code = MPV) 9.3          7.4-10.4                  



[facility] [employer] [id number] 07:52:00





             Test Item    Value        Reference Range Interpretation Comments

 

             ABO/Rh (test code = ABO/Rh) AB POS                                 



[facility] [employer] [id number] 07:52:00





             Test Item    Value        Reference Range Interpretation Comments

 

             Antibody Scrn (test Negative (3/28/22 2:52                         

  



             code = Antibody Scrn) AM)                                    



[place]252-03-28 07:52:00





             Test Item    Value        Reference Range Interpretation Comments

 

             Glucose Lvl (test code = Glucose Lvl) 291          70-99           

          



[place] 924252-03-28 07:52:00





             Test Item    Value        Reference Range Interpretation Comments

 

             BUN (test code = BUN) 16           7-                      



[place] 924252-03-28 07:52:00





             Test Item    Value        Reference Range Interpretation Comments

 

             Creatinine Lvl (test code = Creatinine 0.83         0.50-1.40      

           



             Lvl)                                                



[place] 924252-03-28 07:52:00





             Test Item    Value        Reference Range Interpretation Comments

 

             Sodium Lvl (test code = Sodium Lvl) 138          135-145           

        



[place] 924252-03-28 07:52:00





             Test Item    Value        Reference Range Interpretation Comments

 

             Potassium Lvl (test code = Potassium 4.7          3.5-5.1          

         



             Lvl)                                                



[place] 924252-03-28 07:52:00





             Test Item    Value        Reference Range Interpretation Comments

 

             Chloride Lvl (test code = Chloride Lvl) 113                  

            



[place] 924252-03-28 07:52:00





             Test Item    Value        Reference Range Interpretation Comments

 

             CO2 (test code = CO2) 18           24-32                     



[facility]2022-03-28 07:52:00





             Test Item    Value        Reference Range Interpretation Comments

 

             AGAP (test code = AGAP) 11.7         10.0-20.0                 



[place] 924252-03-28 07:52:00





             Test Item    Value        Reference Range Interpretation Comments

 

             Calcium Lvl (test code = Calcium Lvl) 9.4          8.5-10.5        

          



[facility]2022-03-28 07:52:00





             Test Item    Value        Reference Range Interpretation Comments

 

             eGFR (test code = eGFR) 113                                    



[place] 732852-03-28 07:52:00





             Test Item    Value        Reference Range Interpretation Comments

 

             WBC (test code = WBC) 5.5          3.7-10.4                  



[place] 732852-03-28 07:52:00





             Test Item    Value        Reference Range Interpretation Comments

 

             RBC (test code = RBC) 5.53         4.70-6.10                 



[place] [address]-03-28 07:52:00





             Test Item    Value        Reference Range Interpretation Comments

 

             Hgb (test code = Hgb) 16.3         14.0-18.0                 



[facility]cinjTQXUNKHDHJ2456[facility]43-42 07:52:00





             Test Item    Value        Reference Range Interpretation Comments

 

             Hct (test code = Hct) 50.5         42.0-54.0                 



[facility] HospitalKhmjdtfFYZLVOYFKB6293-12-85 07:52:00





             Test Item    Value        Reference Range Interpretation Comments

 

             MCV (test code = MCV) 91.3         80.0-94.0                 



[facility] HospitalEvpnacsCUSMWRWMMT5430-43-86 07:52:00





             Test Item    Value        Reference Range Interpretation Comments

 

             MCH (test code = MCH) 29.5 pg      27.0-31.0                 



[facility] HospitalCxazsewEVPVGKJKIP5035-72-10 07:52:00





             Test Item    Value        Reference Range Interpretation Comments

 

             MCHC (test code = MCHC) 32.3         32.0-36.0                 



[facility] HospitalTcdjwskABCLSLWWFV2171-04-81 07:52:00





             Test Item    Value        Reference Range Interpretation Comments

 

             RDW (test code = RDW) 15.4         11.5-14.5                 



[facility] HospitalGnmemcrRQSVXCBAXY2383-51-62 07:52:00





             Test Item    Value        Reference Range Interpretation Comments

 

             Platelet (test code = Platelet) 210          133-450               

    



[facility] HospitalWnkcxhzRWSTXTETVO9286-94-28 07:52:00





             Test Item    Value        Reference Range Interpretation Comments

 

             MPV (test code = MPV) 9.3          7.4-10.4                  



[facility] [employer] [id number] 07:52:00





             Test Item    Value        Reference Range Interpretation Comments

 

             ABO/Rh (test code = ABO/Rh) AB POS                                 



[facility] [employer] [id number] 07:52:00





             Test Item    Value        Reference Range Interpretation Comments

 

             Antibody Scrn (test Negative (3/28/22 2:52                         

  



             code = Antibody Scrn) AM)                                    



[facility] [employer] [id number] 07:52:00





             Test Item    Value        Reference Range Interpretation Comments

 

             Glucose Lvl (test code = Glucose Lvl) 291          70-99           

          



[facility] [employer] [id number] 07:52:00





             Test Item    Value        Reference Range Interpretation Comments

 

             BUN (test code = BUN) 16           7-                      



[facility] [employer] [id number] 07:52:00





             Test Item    Value        Reference Range Interpretation Comments

 

             Creatinine Lvl (test code = Creatinine 0.83         0.50-1.40      

           



             Lvl)                                                



[place] 924252-03-28 07:52:00





             Test Item    Value        Reference Range Interpretation Comments

 

             Sodium Lvl (test code = Sodium Lvl) 138          135-145           

        



[place] 924252-03-28 07:52:00





             Test Item    Value        Reference Range Interpretation Comments

 

             Potassium Lvl (test code = Potassium 4.7          3.5-5.1          

         



             Lvl)                                                



[place] 924252-03-28 07:52:00





             Test Item    Value        Reference Range Interpretation Comments

 

             Chloride Lvl (test code = Chloride Lvl) 113                  

            



[place] 924252-03-28 07:52:00





             Test Item    Value        Reference Range Interpretation Comments

 

             CO2 (test code = CO2) 18           24-32                     



[place] 924252-03-28 07:52:00





             Test Item    Value        Reference Range Interpretation Comments

 

             AGAP (test code = AGAP) 11.7         10.0-20.0                 



[place] 924252-03-28 07:52:00





             Test Item    Value        Reference Range Interpretation Comments

 

             Calcium Lvl (test code = Calcium Lvl) 9.4          8.5-10.5        

          



[place] 924252-03-28 07:52:00





             Test Item    Value        Reference Range Interpretation Comments

 

             eGFR (test code = eGFR) 113                                    



[place] 732852-03-28 07:52:00





             Test Item    Value        Reference Range Interpretation Comments

 

             WBC (test code = WBC) 5.5          3.7-10.4                  



[place] 732852-03-28 07:52:00





             Test Item    Value        Reference Range Interpretation Comments

 

             RBC (test code = RBC) 5.53         4.70-6.10                 



[place] [address]-03-28 07:52:00





             Test Item    Value        Reference Range Interpretation Comments

 

             Hgb (test code = Hgb) 16.3         14.0-18.0                 



[place] [address]-03-28 07:52:00





             Test Item    Value        Reference Range Interpretation Comments

 

             Hct (test code = Hct) 50.5         42.0-54.0                 



[place] [address]-03-28 07:52:00





             Test Item    Value        Reference Range Interpretation Comments

 

             MCV (test code = MCV) 91.3         80.0-94.0                 



[address]03-28 07:52:00





             Test Item    Value        Reference Range Interpretation Comments

 

             MCH (test code = MCH) 29.5 pg      27.0-31.0                 



[place] 732852-03-28 07:52:00





             Test Item    Value        Reference Range Interpretation Comments

 

             MCHC (test code = MCHC) 32.3         32.0-36.0                 



[place] 732852-03-28 07:52:00





             Test Item    Value        Reference Range Interpretation Comments

 

             RDW (test code = RDW) 15.4         11.5-14.5                 



[place] [address]-03-28 07:52:00





             Test Item    Value        Reference Range Interpretation Comments

 

             Platelet (test code = Platelet) 210          133-450               

    



[place] 732852-03-28 07:52:00





             Test Item    Value        Reference Range Interpretation Comments

 

             MPV (test code = MPV) 9.3          7.4-10.4                  



[facility]2022-03-27 10:12:00





             Test Item    Value        Reference Range Interpretation Comments

 

             Glucose Lvl (test code = Glucose Lvl) 115          70-99           

          



[facility]2022-03-27 10:12:00





             Test Item    Value        Reference Range Interpretation Comments

 

             BUN (test code = BUN) 18           7-22                      



[place] 924252-03-27 10:12:00





             Test Item    Value        Reference Range Interpretation Comments

 

             Creatinine Lvl (test code = Creatinine 0.78         0.50-1.40      

           



             Lvl)                                                



[place] 924252-03-27 10:12:00





             Test Item    Value        Reference Range Interpretation Comments

 

             Sodium Lvl (test code = Sodium Lvl) 138          135-145           

        



[place] 924252-03-27 10:12:00





             Test Item    Value        Reference Range Interpretation Comments

 

             Potassium Lvl (test code = Potassium 4.6          3.5-5.1          

         



             Lvl)                                                



[place] 924252-03-27 10:12:00





             Test Item    Value        Reference Range Interpretation Comments

 

             Chloride Lvl (test code = Chloride Lvl) 111                  

            



[place] 924252-03-27 10:12:00





             Test Item    Value        Reference Range Interpretation Comments

 

             CO2 (test code = CO2) 21           24-32                     



[place] 924252-03-27 10:12:00





             Test Item    Value        Reference Range Interpretation Comments

 

             AGAP (test code = AGAP) 10.6         10.0-20.0                 



[facility]2022-03-27 10:12:00





             Test Item    Value        Reference Range Interpretation Comments

 

             Calcium Lvl (test code = Calcium Lvl) 8.6          8.5-10.5        

          



[facility]2022-03-27 10:12:00





             Test Item    Value        Reference Range Interpretation Comments

 

             eGFR (test code = eGFR) 116                                    



[facility]znxzirBPECGVYIZO9383-14-12 10:12:00





             Test Item    Value        Reference Range Interpretation Comments

 

             WBC (test code = WBC) 8.2          3.7-10.4                  



[place] 732852-03-27 10:12:00





             Test Item    Value        Reference Range Interpretation Comments

 

             RBC (test code = RBC) 5.14         4.70-6.10                 



[place] 732852-03-27 10:12:00





             Test Item    Value        Reference Range Interpretation Comments

 

             Hgb (test code = Hgb) 15.5         14.0-18.0                 



[place] 732852-03-27 10:12:00





             Test Item    Value        Reference Range Interpretation Comments

 

             Hct (test code = Hct) 46.0         42.0-54.0                 



[place] 732852-03-27 10:12:00





             Test Item    Value        Reference Range Interpretation Comments

 

             MCV (test code = MCV) 89.5         80.0-94.0                 



[place] [address]-03-27 10:12:00





             Test Item    Value        Reference Range Interpretation Comments

 

             MCH (test code = MCH) 30.2 pg      27.0-31.0                 



[place] 732852-03-27 10:12:00





             Test Item    Value        Reference Range Interpretation Comments

 

             MCHC (test code = MCHC) 33.8         32.0-36.0                 



[place] 732852-03-27 10:12:00





             Test Item    Value        Reference Range Interpretation Comments

 

             RDW (test code = RDW) 15.3         11.5-14.5                 



[place] 732852-03-27 10:12:00





             Test Item    Value        Reference Range Interpretation Comments

 

             Platelet (test code = Platelet) 358          133-450               

    



[place] 732852-03-27 10:12:00





             Test Item    Value        Reference Range Interpretation Comments

 

             MPV (test code = MPV) 8.3          7.4-10.4                  



[facility]2022-03-27 10:12:00





             Test Item    Value        Reference Range Interpretation Comments

 

             Glucose Lvl (test code = Glucose Lvl) 115          70-99           

          



[place] 924252-03-27 10:12:00





             Test Item    Value        Reference Range Interpretation Comments

 

             BUN (test code = BUN) 18           7-22                      



[place] 924252-03-27 10:12:00





             Test Item    Value        Reference Range Interpretation Comments

 

             Creatinine Lvl (test code = Creatinine 0.78         0.50-1.40      

           



             Lvl)                                                



[place] 924252-03-27 10:12:00





             Test Item    Value        Reference Range Interpretation Comments

 

             Sodium Lvl (test code = Sodium Lvl) 138          135-145           

        



[place] 924252-03-27 10:12:00





             Test Item    Value        Reference Range Interpretation Comments

 

             Potassium Lvl (test code = Potassium 4.6          3.5-5.1          

         



             Lvl)                                                



[place] 924252-03-27 10:12:00





             Test Item    Value        Reference Range Interpretation Comments

 

             Chloride Lvl (test code = Chloride Lvl) 111                  

            



[place] 924252-03-27 10:12:00





             Test Item    Value        Reference Range Interpretation Comments

 

             CO2 (test code = CO2) 21           24-32                     



[place] 924252-03-27 10:12:00





             Test Item    Value        Reference Range Interpretation Comments

 

             AGAP (test code = AGAP) 10.6         10.0-20.0                 



[place] 924252-03-27 10:12:00





             Test Item    Value        Reference Range Interpretation Comments

 

             Calcium Lvl (test code = Calcium Lvl) 8.6          8.5-10.5        

          



[place] 924252-03-27 10:12:00





             Test Item    Value        Reference Range Interpretation Comments

 

             eGFR (test code = eGFR) 116                                    



[place] [address]-03-27 10:12:00





             Test Item    Value        Reference Range Interpretation Comments

 

             WBC (test code = WBC) 8.2          3.7-10.4                  



[place] [address]-03-27 10:12:00





             Test Item    Value        Reference Range Interpretation Comments

 

             RBC (test code = RBC) 5.14         4.70-6.10                 



[place] 732852-03-27 10:12:00





             Test Item    Value        Reference Range Interpretation Comments

 

             Hgb (test code = Hgb) 15.5         14.0-18.0                 



[address]03-27 10:12:00





             Test Item    Value        Reference Range Interpretation Comments

 

             Hct (test code = Hct) 46.0         42.0-54.0                 



[place] [address]-03-27 10:12:00





             Test Item    Value        Reference Range Interpretation Comments

 

             MCV (test code = MCV) 89.5         80.0-94.0                 



[place] [address]-03-27 10:12:00





             Test Item    Value        Reference Range Interpretation Comments

 

             MCH (test code = MCH) 30.2 pg      27.0-31.0                 



[place] [address]-03-27 10:12:00





             Test Item    Value        Reference Range Interpretation Comments

 

             MCHC (test code = MCHC) 33.8         32.0-36.0                 



[place] [address]-03-27 10:12:00





             Test Item    Value        Reference Range Interpretation Comments

 

             RDW (test code = RDW) 15.3         11.5-14.5                 



[address]03-27 10:12:00





             Test Item    Value        Reference Range Interpretation Comments

 

             Platelet (test code = Platelet) 358          133-450               

    



[place] [address]-03-27 10:12:00





             Test Item    Value        Reference Range Interpretation Comments

 

             MPV (test code = MPV) 8.3          7.4-10.4                  



[place] 924252-03-27 10:12:00





             Test Item    Value        Reference Range Interpretation Comments

 

             Glucose Lvl (test code = Glucose Lvl) 115          70-99           

          



[place] 924252-03-27 10:12:00





             Test Item    Value        Reference Range Interpretation Comments

 

             BUN (test code = BUN) 18           7-22                      



[place] 924252-03-27 10:12:00





             Test Item    Value        Reference Range Interpretation Comments

 

             Creatinine Lvl (test code = Creatinine 0.78         0.50-1.40      

           



             Lvl)                                                



[place] 924252-03-27 10:12:00





             Test Item    Value        Reference Range Interpretation Comments

 

             Sodium Lvl (test code = Sodium Lvl) 138          135-145           

        



[place] 924252-03-27 10:12:00





             Test Item    Value        Reference Range Interpretation Comments

 

             Potassium Lvl (test code = Potassium 4.6          3.5-5.1          

         



             Lvl)                                                



[place] 924252-03-27 10:12:00





             Test Item    Value        Reference Range Interpretation Comments

 

             Chloride Lvl (test code = Chloride Lvl) 111                  

            



[facility]2022-03-27 10:12:00





             Test Item    Value        Reference Range Interpretation Comments

 

             CO2 (test code = CO2) 21           24-32                     



[place] 924252-03-27 10:12:00





             Test Item    Value        Reference Range Interpretation Comments

 

             AGAP (test code = AGAP) 10.6         10.0-20.0                 



[place] 924252-03-27 10:12:00





             Test Item    Value        Reference Range Interpretation Comments

 

             Calcium Lvl (test code = Calcium Lvl) 8.6          8.5-10.5        

          



[facility]2022-03-27 10:12:00





             Test Item    Value        Reference Range Interpretation Comments

 

             eGFR (test code = eGFR) 116                                    



[facility] CenterLzjequqCQGJNKRUGD5446-58-43 10:12:00





             Test Item    Value        Reference Range Interpretation Comments

 

             WBC (test code = WBC) 8.2          3.7-10.4                  



[place] 732852-03-27 10:12:00





             Test Item    Value        Reference Range Interpretation Comments

 

             RBC (test code = RBC) 5.14         4.70-6.10                 



[place] 732852-03-27 10:12:00





             Test Item    Value        Reference Range Interpretation Comments

 

             Hgb (test code = Hgb) 15.5         14.0-18.0                 



[place] 732852-03-27 10:12:00





             Test Item    Value        Reference Range Interpretation Comments

 

             Hct (test code = Hct) 46.0         42.0-54.0                 



[place] 732852-03-27 10:12:00





             Test Item    Value        Reference Range Interpretation Comments

 

             MCV (test code = MCV) 89.5         80.0-94.0                 



[place] 732852-03-27 10:12:00





             Test Item    Value        Reference Range Interpretation Comments

 

             MCH (test code = MCH) 30.2 pg      27.0-31.0                 



[place] [address]-03-27 10:12:00





             Test Item    Value        Reference Range Interpretation Comments

 

             MCHC (test code = MCHC) 33.8         32.0-36.0                 



[place] [address]-03-27 10:12:00





             Test Item    Value        Reference Range Interpretation Comments

 

             RDW (test code = RDW) 15.3         11.5-14.5                 



[place] 732852-03-27 10:12:00





             Test Item    Value        Reference Range Interpretation Comments

 

             Platelet (test code = Platelet) 358          133-450               

    



[place] 732852-03-27 10:12:00





             Test Item    Value        Reference Range Interpretation Comments

 

             MPV (test code = MPV) 8.3          7.4-10.4                  



[place] 924252-03-27 10:12:00





             Test Item    Value        Reference Range Interpretation Comments

 

             Glucose Lvl (test code = Glucose Lvl) 115          70-99           

          



[place] 924252-03-27 10:12:00





             Test Item    Value        Reference Range Interpretation Comments

 

             BUN (test code = BUN) 18           7-22                      



[place] 924252-03-27 10:12:00





             Test Item    Value        Reference Range Interpretation Comments

 

             Creatinine Lvl (test code = Creatinine 0.78         0.50-1.40      

           



             Lvl)                                                



[place] 924252-03-27 10:12:00





             Test Item    Value        Reference Range Interpretation Comments

 

             Sodium Lvl (test code = Sodium Lvl) 138          135-145           

        



[place] 924252-03-27 10:12:00





             Test Item    Value        Reference Range Interpretation Comments

 

             Potassium Lvl (test code = Potassium 4.6          3.5-5.1          

         



             Lvl)                                                



[place] 924252-03-27 10:12:00





             Test Item    Value        Reference Range Interpretation Comments

 

             Chloride Lvl (test code = Chloride Lvl) 111                  

            



[place] 924252-03-27 10:12:00





             Test Item    Value        Reference Range Interpretation Comments

 

             CO2 (test code = CO2) 21           24-32                     



[place] 924252-03-27 10:12:00





             Test Item    Value        Reference Range Interpretation Comments

 

             AGAP (test code = AGAP) 10.6         10.0-20.0                 



[place] 924252-03-27 10:12:00





             Test Item    Value        Reference Range Interpretation Comments

 

             Calcium Lvl (test code = Calcium Lvl) 8.6          8.5-10.5        

          



[place] 924252-03-27 10:12:00





             Test Item    Value        Reference Range Interpretation Comments

 

             eGFR (test code = eGFR) 116                                    



[place] 732852-03-27 10:12:00





             Test Item    Value        Reference Range Interpretation Comments

 

             WBC (test code = WBC) 8.2          3.7-10.4                  



[place] 732852-03-27 10:12:00





             Test Item    Value        Reference Range Interpretation Comments

 

             RBC (test code = RBC) 5.14         4.70-6.10                 



[place] [address]-03-27 10:12:00





             Test Item    Value        Reference Range Interpretation Comments

 

             Hgb (test code = Hgb) 15.5         14.0-18.0                 



[place] [address]-03-27 10:12:00





             Test Item    Value        Reference Range Interpretation Comments

 

             Hct (test code = Hct) 46.0         42.0-54.0                 



[place] 732852-03-27 10:12:00





             Test Item    Value        Reference Range Interpretation Comments

 

             MCV (test code = MCV) 89.5         80.0-94.0                 



[place] [address]-03-27 10:12:00





             Test Item    Value        Reference Range Interpretation Comments

 

             MCH (test code = MCH) 30.2 pg      27.0-31.0                 



[place] [address]-03-27 10:12:00





             Test Item    Value        Reference Range Interpretation Comments

 

             MCHC (test code = MCHC) 33.8         32.0-36.0                 



[place] [address]-03-27 10:12:00





             Test Item    Value        Reference Range Interpretation Comments

 

             RDW (test code = RDW) 15.3         11.5-14.5                 



[place] [address]-03-27 10:12:00





             Test Item    Value        Reference Range Interpretation Comments

 

             Platelet (test code = Platelet) 358          133-450               

    



[place] 732852-03-27 10:12:00





             Test Item    Value        Reference Range Interpretation Comments

 

             MPV (test code = MPV) 8.3          7.4-10.4                  



[facility]2022-03-27 10:12:00





             Test Item    Value        Reference Range Interpretation Comments

 

             Glucose Lvl (test code = Glucose Lvl) 115          70-99           

          



[place] 924252-03-27 10:12:00





             Test Item    Value        Reference Range Interpretation Comments

 

             BUN (test code = BUN) 18           7-22                      



[place] 924252-03-27 10:12:00





             Test Item    Value        Reference Range Interpretation Comments

 

             Creatinine Lvl (test code = Creatinine 0.78         0.50-1.40      

           



             Lvl)                                                



[facility]2022-03-27 10:12:00





             Test Item    Value        Reference Range Interpretation Comments

 

             Sodium Lvl (test code = Sodium Lvl) 138          135-145           

        



[place] 924252-03-27 10:12:00





             Test Item    Value        Reference Range Interpretation Comments

 

             Potassium Lvl (test code = Potassium 4.6          3.5-5.1          

         



             Lvl)                                                



[place] 924252-03-27 10:12:00





             Test Item    Value        Reference Range Interpretation Comments

 

             Chloride Lvl (test code = Chloride Lvl) 111                  

            



[place] 924252-03-27 10:12:00





             Test Item    Value        Reference Range Interpretation Comments

 

             CO2 (test code = CO2) 21           24-32                     



[place] 924252-03-27 10:12:00





             Test Item    Value        Reference Range Interpretation Comments

 

             AGAP (test code = AGAP) 10.6         10.0-20.0                 



[place] [address]-03-27 10:12:00





             Test Item    Value        Reference Range Interpretation Comments

 

             Calcium Lvl (test code = Calcium Lvl) 8.6          8.5-10.5        

          



[place] 924252-03-27 10:12:00





             Test Item    Value        Reference Range Interpretation Comments

 

             eGFR (test code = eGFR) 116                                    



[place] 732852-03-27 10:12:00





             Test Item    Value        Reference Range Interpretation Comments

 

             WBC (test code = WBC) 8.2          3.7-10.4                  



[place] [address]-03-27 10:12:00





             Test Item    Value        Reference Range Interpretation Comments

 

             RBC (test code = RBC) 5.14         4.70-6.10                 



[place] [address]-03-27 10:12:00





             Test Item    Value        Reference Range Interpretation Comments

 

             Hgb (test code = Hgb) 15.5         14.0-18.0                 



[place] [address]-03-27 10:12:00





             Test Item    Value        Reference Range Interpretation Comments

 

             Hct (test code = Hct) 46.0         42.0-54.0                 



[place] [address]-03-27 10:12:00





             Test Item    Value        Reference Range Interpretation Comments

 

             MCV (test code = MCV) 89.5         80.0-94.0                 



[place] [address]-03-27 10:12:00





             Test Item    Value        Reference Range Interpretation Comments

 

             MCH (test code = MCH) 30.2 pg      27.0-31.0                 



[place] [address]-03-27 10:12:00





             Test Item    Value        Reference Range Interpretation Comments

 

             MCHC (test code = MCHC) 33.8         32.0-36.0                 



[place] [address]-03-27 10:12:00





             Test Item    Value        Reference Range Interpretation Comments

 

             RDW (test code = RDW) 15.3         11.5-14.5                 



[place] [address]-03-27 10:12:00





             Test Item    Value        Reference Range Interpretation Comments

 

             Platelet (test code = Platelet) 358          133-450               

    



[place] 732852-03-27 10:12:00





             Test Item    Value        Reference Range Interpretation Comments

 

             MPV (test code = MPV) 8.3          7.4-10.4                  



[place] 924252-03-27 10:12:00





             Test Item    Value        Reference Range Interpretation Comments

 

             Glucose Lvl (test code = Glucose Lvl) 115          70-99           

          



[place] 924252-03-27 10:12:00





             Test Item    Value        Reference Range Interpretation Comments

 

             BUN (test code = BUN) 18           7-22                      



[place] 924252-03-27 10:12:00





             Test Item    Value        Reference Range Interpretation Comments

 

             Creatinine Lvl (test code = Creatinine 0.78         0.50-1.40      

           



             Lvl)                                                



[place] 924252-03-27 10:12:00





             Test Item    Value        Reference Range Interpretation Comments

 

             Sodium Lvl (test code = Sodium Lvl) 138          135-145           

        



[place] 924252-03-27 10:12:00





             Test Item    Value        Reference Range Interpretation Comments

 

             Potassium Lvl (test code = Potassium 4.6          3.5-5.1          

         



             Lvl)                                                



[place] 924252-03-27 10:12:00





             Test Item    Value        Reference Range Interpretation Comments

 

             Chloride Lvl (test code = Chloride Lvl) 111                  

            



[place] 924252-03-27 10:12:00





             Test Item    Value        Reference Range Interpretation Comments

 

             CO2 (test code = CO2) 21           24-32                     



[place] 924252-03-27 10:12:00





             Test Item    Value        Reference Range Interpretation Comments

 

             AGAP (test code = AGAP) 10.6         10.0-20.0                 



[place] 924252-03-27 10:12:00





             Test Item    Value        Reference Range Interpretation Comments

 

             Calcium Lvl (test code = Calcium Lvl) 8.6          8.5-10.5        

          



[facility]2022-03-27 10:12:00





             Test Item    Value        Reference Range Interpretation Comments

 

             eGFR (test code = eGFR) 116                                    



[place] 732852-03-27 10:12:00





             Test Item    Value        Reference Range Interpretation Comments

 

             WBC (test code = WBC) 8.2          3.7-10.4                  



[facility]





             Test Item    Value        Reference Range Interpretation Comments

 

             RBC (test code = RBC) 5.14         4.70-6.10                 



[facility] Center[facility]ubzhepGLTDVWMYYE0295[facility]69[facility]55 10[facility]12:00





             Test Item    Value        Reference Range Interpretation Comments

 

             Hgb (test code = Hgb) 15.5         14.0-18.0                 



[place] 732852-03-27 10:12:00





             Test Item    Value        Reference Range Interpretation Comments

 

             Hct (test code = Hct) 46.0         42.0-54.0                 



[place] 732852-03-27 10:12:00





             Test Item    Value        Reference Range Interpretation Comments

 

             MCV (test code = MCV) 89.5         80.0-94.0                 



[facility] Center[facility]jbcfiaLHXYOLMBHX7179[facility]10-64 10:12:00





             Test Item    Value        Reference Range Interpretation Comments

 

             MCH (test code = MCH) 30.2 pg      27.0-31.0                 



[place] 732852-03-27 10:12:00





             Test Item    Value        Reference Range Interpretation Comments

 

             MCHC (test code = MCHC) 33.8         32.0-36.0                 



[place] 732852-03-27 10:12:00





             Test Item    Value        Reference Range Interpretation Comments

 

             RDW (test code = RDW) 15.3         11.5-14.5                 



[facility] CenterArzpwbuGIXKAAYJUZ2177-13-09 10:12:00





             Test Item    Value        Reference Range Interpretation Comments

 

             Platelet (test code = Platelet) 358          133-450               

    



[facility] CenterXxrlfkmKZQVYJEOHD5828-42-62 10:12:00





             Test Item    Value        Reference Range Interpretation Comments

 

             MPV (test code = MPV) 8.3          7.4-10.4                  



[facility]2022-03-27 10:12:00





             Test Item    Value        Reference Range Interpretation Comments

 

             Glucose Lvl (test code = Glucose Lvl) 115          70-99           

          



[facility]2022-03-27 10:12:00





             Test Item    Value        Reference Range Interpretation Comments

 

             BUN (test code = BUN) 18           7-22                      



[place] 924252-03-27 10:12:00





             Test Item    Value        Reference Range Interpretation Comments

 

             Creatinine Lvl (test code = Creatinine 0.78         0.50-1.40      

           



             Lvl)                                                



[place] 924252-03-27 10:12:00





             Test Item    Value        Reference Range Interpretation Comments

 

             Sodium Lvl (test code = Sodium Lvl) 138          135-145           

        



[place] 924252-03-27 10:12:00





             Test Item    Value        Reference Range Interpretation Comments

 

             Potassium Lvl (test code = Potassium 4.6          3.5-5.1          

         



             Lvl)                                                



[place] 924252-03-27 10:12:00





             Test Item    Value        Reference Range Interpretation Comments

 

             Chloride Lvl (test code = Chloride Lvl) 111                  

            



[place] 924252-03-27 10:12:00





             Test Item    Value        Reference Range Interpretation Comments

 

             CO2 (test code = CO2) 21           24-32                     



[place] 924252-03-27 10:12:00





             Test Item    Value        Reference Range Interpretation Comments

 

             AGAP (test code = AGAP) 10.6         10.0-20.0                 



[place] 924252-03-27 10:12:00





             Test Item    Value        Reference Range Interpretation Comments

 

             Calcium Lvl (test code = Calcium Lvl) 8.6          8.5-10.5        

          



[place] 924252-03-27 10:12:00





             Test Item    Value        Reference Range Interpretation Comments

 

             eGFR (test code = eGFR) 116                                    



[place] 732852-03-27 10:12:00





             Test Item    Value        Reference Range Interpretation Comments

 

             WBC (test code = WBC) 8.2          3.7-10.4                  



[place] [address]-03-27 10:12:00





             Test Item    Value        Reference Range Interpretation Comments

 

             RBC (test code = RBC) 5.14         4.70-6.10                 



[place] [address]-03-27 10:12:00





             Test Item    Value        Reference Range Interpretation Comments

 

             Hgb (test code = Hgb) 15.5         14.0-18.0                 



[place] [address]-03-27 10:12:00





             Test Item    Value        Reference Range Interpretation Comments

 

             Hct (test code = Hct) 46.0         42.0-54.0                 



[place] 732852-03-27 10:12:00





             Test Item    Value        Reference Range Interpretation Comments

 

             MCV (test code = MCV) 89.5         80.0-94.0                 



[place] [address]-03-27 10:12:00





             Test Item    Value        Reference Range Interpretation Comments

 

             MCH (test code = MCH) 30.2 pg      27.0-31.0                 



[place] [address]-03-27 10:12:00





             Test Item    Value        Reference Range Interpretation Comments

 

             MCHC (test code = MCHC) 33.8         32.0-36.0                 



[place] 732852-03-27 10:12:00





             Test Item    Value        Reference Range Interpretation Comments

 

             RDW (test code = RDW) 15.3         11.5-14.5                 



[place] [address]-03-27 10:12:00





             Test Item    Value        Reference Range Interpretation Comments

 

             Platelet (test code = Platelet) 358          243-450               

    



[place] 732852-03-27 10:12:00





             Test Item    Value        Reference Range Interpretation Comments

 

             MPV (test code = MPV) 8.3          7.4-10.4                  



[facility]2022-03-27 10:12:00





             Test Item    Value        Reference Range Interpretation Comments

 

             Glucose Lvl (test code = Glucose Lvl) 115          70-99           

          



[place]252-03-27 10:12:00





             Test Item    Value        Reference Range Interpretation Comments

 

             BUN (test code = BUN) 18           7-22                      



[facility]2022-03-27 10:12:00





             Test Item    Value        Reference Range Interpretation Comments

 

             Creatinine Lvl (test code = Creatinine 0.78         0.50-1.40      

           



             Lvl)                                                



[place]252-03-27 10:12:00





             Test Item    Value        Reference Range Interpretation Comments

 

             Sodium Lvl (test code = Sodium Lvl) 138          135-145           

        



[place] 924252-03-27 10:12:00





             Test Item    Value        Reference Range Interpretation Comments

 

             Potassium Lvl (test code = Potassium 4.6          3.5-5.1          

         



             Lvl)                                                



[place] 924252-03-27 10:12:00





             Test Item    Value        Reference Range Interpretation Comments

 

             Chloride Lvl (test code = Chloride Lvl) 111                  

            



[place] 924252-03-27 10:12:00





             Test Item    Value        Reference Range Interpretation Comments

 

             CO2 (test code = CO2) 21           24-32                     



[place] 924252-03-27 10:12:00





             Test Item    Value        Reference Range Interpretation Comments

 

             AGAP (test code = AGAP) 10.6         10.0-20.0                 



[facility]2022-03-27 10:12:00





             Test Item    Value        Reference Range Interpretation Comments

 

             Calcium Lvl (test code = Calcium Lvl) 8.6          8.5-10.5        

          



[facility]2022-03-27 10:12:00





             Test Item    Value        Reference Range Interpretation Comments

 

             eGFR (test code = eGFR) 116                                    



[facility] CenterPmuspusCBAEJJKROM8728-07-51 10:12:00





             Test Item    Value        Reference Range Interpretation Comments

 

             WBC (test code = WBC) 8.2          3.7-10.4                  



[facility] CenterGkxxaasDJQTXBRGLQ7720-60-26 10:12:00





             Test Item    Value        Reference Range Interpretation Comments

 

             RBC (test code = RBC) 5.14         4.70-6.10                 



[facility] CenterPgpdkycWLCSSXPJCA0628-50-43 10:12:00





             Test Item    Value        Reference Range Interpretation Comments

 

             Hgb (test code = Hgb) 15.5         14.0-18.0                 



[facility] CenterBhegigwVDIJJHMLCR0213-51-33 10:12:00





             Test Item    Value        Reference Range Interpretation Comments

 

             Hct (test code = Hct) 46.0         42.0-54.0                 



[facility] CenterNxjwfljHPDACHXXWQ6057-88-60 10:12:00





             Test Item    Value        Reference Range Interpretation Comments

 

             MCV (test code = MCV) 89.5         80.0-94.0                 



[place] 732852-03-27 10:12:00





             Test Item    Value        Reference Range Interpretation Comments

 

             MCH (test code = MCH) 30.2 pg      27.0-31.0                 



[place] 732852-03-27 10:12:00





             Test Item    Value        Reference Range Interpretation Comments

 

             MCHC (test code = MCHC) 33.8         32.0-36.0                 



[place] 732852-03-27 10:12:00





             Test Item    Value        Reference Range Interpretation Comments

 

             RDW (test code = RDW) 15.3         11.5-14.5                 



[facility] CenterWwpflleHUPAUBGQMJ4437-94-80 10:12:00





             Test Item    Value        Reference Range Interpretation Comments

 

             Platelet (test code = Platelet) 358          133-450               

    



[facility] CenterSjhanpdQAYUTMHOMS2310-06-80 10:12:00





             Test Item    Value        Reference Range Interpretation Comments

 

             MPV (test code = MPV) 8.3          7.4-10.4                  



[place]252-03-27 06:53:00





             Test Item    Value        Reference Range Interpretation Comments

 

             Glucose Lvl (test code = Glucose Lvl) 167          70-99           

          



[place] 924252-03-27 06:53:00





             Test Item    Value        Reference Range Interpretation Comments

 

             BUN (test code = BUN) 16                                 



[place] 924252-03-27 06:53:00





             Test Item    Value        Reference Range Interpretation Comments

 

             Creatinine Lvl (test code = Creatinine 0.99         0.50-1.40      

           



             Lvl)                                                



[place] 924252-03-27 06:53:00





             Test Item    Value        Reference Range Interpretation Comments

 

             Sodium Lvl (test code = Sodium Lvl) 141          135-145           

        



[place] 924252-03-27 06:53:00





             Test Item    Value        Reference Range Interpretation Comments

 

             Potassium Lvl (test code = Potassium 4.5          3.5-5.1          

         



             Lvl)                                                



[place] 924252-03-27 06:53:00





             Test Item    Value        Reference Range Interpretation Comments

 

             Chloride Lvl (test code = Chloride Lvl) 111                  

            



[facility]2022-03-27 06:53:00





             Test Item    Value        Reference Range Interpretation Comments

 

             CO2 (test code = CO2) 22           24-32                     



[place] 924252-03-27 06:53:00





             Test Item    Value        Reference Range Interpretation Comments

 

             AGAP (test code = AGAP) 12.5         10.0-20.0                 



[place] 924252-03-27 06:53:00





             Test Item    Value        Reference Range Interpretation Comments

 

             Calcium Lvl (test code = Calcium Lvl) 8.6          8.5-10.5        

          



[place] 924252-03-27 06:53:00





             Test Item    Value        Reference Range Interpretation Comments

 

             eGFR (test code = eGFR) 98                                     



[place] 924252-03-27 06:53:00





             Test Item    Value        Reference Range Interpretation Comments

 

             Glucose Lvl (test code = Glucose Lvl) 167          70-99           

          



[place] 924252-03-27 06:53:00





             Test Item    Value        Reference Range Interpretation Comments

 

             BUN (test code = BUN) 16           7-                      



[place] 924252-03-27 06:53:00





             Test Item    Value        Reference Range Interpretation Comments

 

             Creatinine Lvl (test code = Creatinine 0.99         0.50-1.40      

           



             Lvl)                                                



[place] 924252-03-27 06:53:00





             Test Item    Value        Reference Range Interpretation Comments

 

             Sodium Lvl (test code = Sodium Lvl) 141          135-145           

        



[place] 924252-03-27 06:53:00





             Test Item    Value        Reference Range Interpretation Comments

 

             Potassium Lvl (test code = Potassium 4.5          3.5-5.1          

         



             Lvl)                                                



[place] 924252-03-27 06:53:00





             Test Item    Value        Reference Range Interpretation Comments

 

             Chloride Lvl (test code = Chloride Lvl) 111                  

            



[place] 924252-03-27 06:53:00





             Test Item    Value        Reference Range Interpretation Comments

 

             CO2 (test code = CO2) 22           24-32                     



[place] 924252-03-27 06:53:00





             Test Item    Value        Reference Range Interpretation Comments

 

             AGAP (test code = AGAP) 12.5         10.0-20.0                 



[place] 924252-03-27 06:53:00





             Test Item    Value        Reference Range Interpretation Comments

 

             Calcium Lvl (test code = Calcium Lvl) 8.6          8.5-10.5        

          



[place] 924252-03-27 06:53:00





             Test Item    Value        Reference Range Interpretation Comments

 

             eGFR (test code = eGFR) 98                                     



[place] 924252-03-27 06:53:00





             Test Item    Value        Reference Range Interpretation Comments

 

             Glucose Lvl (test code = Glucose Lvl) 167          70-99           

          



[place] 924252-03-27 06:53:00





             Test Item    Value        Reference Range Interpretation Comments

 

             BUN (test code = BUN) 16           7-22                      



[place] 924252-03-27 06:53:00





             Test Item    Value        Reference Range Interpretation Comments

 

             Creatinine Lvl (test code = Creatinine 0.99         0.50-1.40      

           



             Lvl)                                                



[place] 924252-03-27 06:53:00





             Test Item    Value        Reference Range Interpretation Comments

 

             Sodium Lvl (test code = Sodium Lvl) 141          135-145           

        



[place] 924252-03-27 06:53:00





             Test Item    Value        Reference Range Interpretation Comments

 

             Potassium Lvl (test code = Potassium 4.5          3.5-5.1          

         



             Lvl)                                                



[place] 924252-03-27 06:53:00





             Test Item    Value        Reference Range Interpretation Comments

 

             Chloride Lvl (test code = Chloride Lvl) 111                  

            



[place] 924252-03-27 06:53:00





             Test Item    Value        Reference Range Interpretation Comments

 

             CO2 (test code = CO2)                      



[place] 924252-03-27 06:53:00





             Test Item    Value        Reference Range Interpretation Comments

 

             AGAP (test code = AGAP) 12.5         10.0-20.0                 



[place] 924252-03-27 06:53:00





             Test Item    Value        Reference Range Interpretation Comments

 

             Calcium Lvl (test code = Calcium Lvl) 8.6          8.5-10.5        

          



[place] 924252-03-27 06:53:00





             Test Item    Value        Reference Range Interpretation Comments

 

             eGFR (test code = eGFR) 98                                     



[place] 924252-03-27 06:53:00





             Test Item    Value        Reference Range Interpretation Comments

 

             Glucose Lvl (test code = Glucose Lvl) 167          70-99           

          



[place] 924252-03-27 06:53:00





             Test Item    Value        Reference Range Interpretation Comments

 

             BUN (test code = BUN) 16                                 



[place] 924252-03-27 06:53:00





             Test Item    Value        Reference Range Interpretation Comments

 

             Creatinine Lvl (test code = Creatinine 0.99         0.50-1.40      

           



             Lvl)                                                



[facility]2022-03-27 06:53:00





             Test Item    Value        Reference Range Interpretation Comments

 

             Sodium Lvl (test code = Sodium Lvl) 141          135-145           

        



[place] 924252-03-27 06:53:00





             Test Item    Value        Reference Range Interpretation Comments

 

             Potassium Lvl (test code = Potassium 4.5          3.5-5.1          

         



             Lvl)                                                



[place] 924252-03-27 06:53:00





             Test Item    Value        Reference Range Interpretation Comments

 

             Chloride Lvl (test code = Chloride Lvl) 111                  

            



[place] 924252-03-27 06:53:00





             Test Item    Value        Reference Range Interpretation Comments

 

             CO2 (test code = CO2)                      



[place] 924252-03-27 06:53:00





             Test Item    Value        Reference Range Interpretation Comments

 

             AGAP (test code = AGAP) 12.5         10.0-20.0                 



[place] 924252-03-27 06:53:00





             Test Item    Value        Reference Range Interpretation Comments

 

             Calcium Lvl (test code = Calcium Lvl) 8.6          8.5-10.5        

          



[place] 924252-03-27 06:53:00





             Test Item    Value        Reference Range Interpretation Comments

 

             eGFR (test code = eGFR) 98                                     



[place] 924252-03-27 06:53:00





             Test Item    Value        Reference Range Interpretation Comments

 

             Glucose Lvl (test code = Glucose Lvl) 167          70-99           

          



[place] 924252-03-27 06:53:00





             Test Item    Value        Reference Range Interpretation Comments

 

             BUN (test code = BUN) 16           7-22                      



[place] 924252-03-27 06:53:00





             Test Item    Value        Reference Range Interpretation Comments

 

             Creatinine Lvl (test code = Creatinine 0.99         0.50-1.40      

           



             Lvl)                                                



[place] 924252-03-27 06:53:00





             Test Item    Value        Reference Range Interpretation Comments

 

             Sodium Lvl (test code = Sodium Lvl) 141          135-145           

        



[place] 924252-03-27 06:53:00





             Test Item    Value        Reference Range Interpretation Comments

 

             Potassium Lvl (test code = Potassium 4.5          3.5-5.1          

         



             Lvl)                                                



[place] 924252-03-27 06:53:00





             Test Item    Value        Reference Range Interpretation Comments

 

             Chloride Lvl (test code = Chloride Lvl) 111                  

            



[place] 924252-03-27 06:53:00





             Test Item    Value        Reference Range Interpretation Comments

 

             CO2 (test code = CO2) 22           24-32                     



[place] 924252-03-27 06:53:00





             Test Item    Value        Reference Range Interpretation Comments

 

             AGAP (test code = AGAP) 12.5         10.0-20.0                 



[place] 924252-03-27 06:53:00





             Test Item    Value        Reference Range Interpretation Comments

 

             Calcium Lvl (test code = Calcium Lvl) 8.6          8.5-10.5        

          



[place] 924252-03-27 06:53:00





             Test Item    Value        Reference Range Interpretation Comments

 

             eGFR (test code = eGFR) 98                                     



[place] 924252-03-27 06:53:00





             Test Item    Value        Reference Range Interpretation Comments

 

             Glucose Lvl (test code = Glucose Lvl) 167          70-99           

          



[place] 924252-03-27 06:53:00





             Test Item    Value        Reference Range Interpretation Comments

 

             BUN (test code = BUN) 16           -                      



[place] 924252-03-27 06:53:00





             Test Item    Value        Reference Range Interpretation Comments

 

             Creatinine Lvl (test code = Creatinine 0.99         0.50-1.40      

           



             Lvl)                                                



[place] 924252-03-27 06:53:00





             Test Item    Value        Reference Range Interpretation Comments

 

             Sodium Lvl (test code = Sodium Lvl) 141          135-145           

        



[place] 924252-03-27 06:53:00





             Test Item    Value        Reference Range Interpretation Comments

 

             Potassium Lvl (test code = Potassium 4.5          3.5-5.1          

         



             Lvl)                                                



[place] 924252-03-27 06:53:00





             Test Item    Value        Reference Range Interpretation Comments

 

             Chloride Lvl (test code = Chloride Lvl) 111                  

            



[place] 924252-03-27 06:53:00





             Test Item    Value        Reference Range Interpretation Comments

 

             CO2 (test code = CO2) -32                     



[place] 924252-03-27 06:53:00





             Test Item    Value        Reference Range Interpretation Comments

 

             AGAP (test code = AGAP) 12.5         10.0-20.0                 



[facility]2022-03-27 06:53:00





             Test Item    Value        Reference Range Interpretation Comments

 

             Calcium Lvl (test code = Calcium Lvl) 8.6          8.5-10.5        

          



[facility]2022-03-27 06:53:00





             Test Item    Value        Reference Range Interpretation Comments

 

             eGFR (test code = eGFR) 98                                     



[place] 924252-03-27 06:53:00





             Test Item    Value        Reference Range Interpretation Comments

 

             Glucose Lvl (test code = Glucose Lvl) 167          70-99           

          



[place] 924252-03-27 06:53:00





             Test Item    Value        Reference Range Interpretation Comments

 

             BUN (test code = BUN) 16           -                      



[facility]2022-03-27 06:53:00





             Test Item    Value        Reference Range Interpretation Comments

 

             Creatinine Lvl (test code = Creatinine 0.99         0.50-1.40      

           



             Lvl)                                                



[facility]2022-03-27 06:53:00





             Test Item    Value        Reference Range Interpretation Comments

 

             Sodium Lvl (test code = Sodium Lvl) 141          135-145           

        



[place] 924252-03-27 06:53:00





             Test Item    Value        Reference Range Interpretation Comments

 

             Potassium Lvl (test code = Potassium 4.5          3.5-5.1          

         



             Lvl)                                                



[place] 924252-03-27 06:53:00





             Test Item    Value        Reference Range Interpretation Comments

 

             Chloride Lvl (test code = Chloride Lvl) 111                  

            



[place] 924252-03-27 06:53:00





             Test Item    Value        Reference Range Interpretation Comments

 

             CO2 (test code = CO2)                      



[place] 924252-03-27 06:53:00





             Test Item    Value        Reference Range Interpretation Comments

 

             AGAP (test code = AGAP) 12.5         10.0-20.0                 



[place] 924252-03-27 06:53:00





             Test Item    Value        Reference Range Interpretation Comments

 

             Calcium Lvl (test code = Calcium Lvl) 8.6          8.5-10.5        

          



[facility]2022-03-27 06:53:00





             Test Item    Value        Reference Range Interpretation Comments

 

             eGFR (test code = eGFR) 98                                     



[place] 924252-03-27 06:53:00





             Test Item    Value        Reference Range Interpretation Comments

 

             Glucose Lvl (test code = Glucose Lvl) 167          70-99           

          



[place] 924252-03-27 06:53:00





             Test Item    Value        Reference Range Interpretation Comments

 

             BUN (test code = BUN) 16                                 



[facility]2022-03-27 06:53:00





             Test Item    Value        Reference Range Interpretation Comments

 

             Creatinine Lvl (test code = Creatinine 0.99         0.50-1.40      

           



             Lvl)                                                



[facility]2022-03-27 06:53:00





             Test Item    Value        Reference Range Interpretation Comments

 

             Sodium Lvl (test code = Sodium Lvl) 141          135-145           

        



[place] 924252-03-27 06:53:00





             Test Item    Value        Reference Range Interpretation Comments

 

             Potassium Lvl (test code = Potassium 4.5          3.5-5.1          

         



             Lvl)                                                



[place] 924252-03-27 06:53:00





             Test Item    Value        Reference Range Interpretation Comments

 

             Chloride Lvl (test code = Chloride Lvl) 111                  

            



[place] 924252-03-27 06:53:00





             Test Item    Value        Reference Range Interpretation Comments

 

             CO2 (test code = CO2)                      



[place] 924252-03-27 06:53:00





             Test Item    Value        Reference Range Interpretation Comments

 

             AGAP (test code = AGAP) 12.5         10.0-20.0                 



[id number]





             Test Item    Value        Reference Range Interpretation Comments

 

             Calcium Lvl (test code = Calcium Lvl) 8.6          8.5-10.5        

          



[facility] [id number]





             Test Item    Value        Reference Range Interpretation Comments

 

             eGFR (test code = eGFR) 98                                     



[facility]PT:ISOLATE:ORDQN:[id number] 22:59:00





             Test Item    Value        Reference Range Interpretation Comments

 

             Culture: Urine (test >100,000 CFU/mL Proteus                       

    



             code = Culture: mirabilis >100,000 CFU/mL                          

 



             Urine)       Providencia stuartii                           



[facility]:PT:ISOLATE:ORDQN:[id number] 22:59:00





             Test Item    Value        Reference Range Interpretation Comments

 

             Providencia stuartii Providencia stuartii                          

 



             (test code = Providencia                                        



             stuartii)                                           



[facility]PT:ISOLATE:ORDQN:[id number] 22:59:00





             Test Item    Value        Reference Range Interpretation Comments

 

             Proteus mirabilis (test Proteus mirabilis                          

 



             code = Proteus mirabilis)                                        



[facility] [id number] 22:59:00





             Test Item    Value        Reference Range Interpretation Comments

 

             Culture: Urine (test Holding For Better                           



             code = Culture: Urine) Growth                                 



[facility]PT:ISOLATE:ORDQN:[id number] 22:59:00





             Test Item    Value        Reference Range Interpretation Comments

 

             Culture: Urine (test >100,000 CFU/mL Proteus                       

    



             code = Culture: mirabilis >100,000 CFU/mL                          

 



             Urine)       Providencia stuartii                           



[facility]USC:PT:ISOLATE:ORDQN:[id number] 22:59:00





             Test Item    Value        Reference Range Interpretation Comments

 

             Providencia stuartii Providencia stuartii                          

 



             (test code = Providencia                                        



             stuartii)                                           



[facility]:ISOLATE:ORDQN:[id number] 22:59:00





             Test Item    Value        Reference Range Interpretation Comments

 

             Proteus mirabilis (test Proteus mirabilis                          

 



             code = Proteus mirabilis)                                        



[facility] [id number] 22:59:00





             Test Item    Value        Reference Range Interpretation Comments

 

             Culture: Urine (test Holding For Better                           



             code = Culture: Urine) Growth                                 



[facility]USC:PT:ISOLATE:ORDQN:[id number] 22:59:00





             Test Item    Value        Reference Range Interpretation Comments

 

             Culture: Urine (test >100,000 CFU/mL Proteus                       

    



             code = Culture: mirabilis >100,000 CFU/mL                          

 



             Urine)       Providencia stuartii                           



[facility]USC:PT:ISOLATE:ORDQN:[id number] 22:59:00





             Test Item    Value        Reference Range Interpretation Comments

 

             Providencia stuartii Providencia stuartii                          

 



             (test code = Providencia                                        



             stuartii)                                           



[facility]PT:ISOLATE:ORDQN:[id number] 22:59:00





             Test Item    Value        Reference Range Interpretation Comments

 

             Proteus mirabilis (test Proteus mirabilis                          

 



             code = Proteus mirabilis)                                        



[facility] [id number] 22:59:00





             Test Item    Value        Reference Range Interpretation Comments

 

             Culture: Urine (test Holding For Better                           



             code = Culture: Urine) Growth                                 



[facility]USC:PT:ISOLATE:ORDQN:[id number] 22:59:00





             Test Item    Value        Reference Range Interpretation Comments

 

             Culture: Urine (test >100,000 CFU/mL Proteus                       

    



             code = Culture: mirabilis >100,000 CFU/mL                          

 



             Urine)       Providencia stuartii                           



[facility]USC:PT:ISOLATE:ORDQN:[id number] 22:59:00





             Test Item    Value        Reference Range Interpretation Comments

 

             Providencia stuartii Providencia stuartii                          

 



             (test code = Providencia                                        



             stuartii)                                           



[facility]PT:ISOLATE:ORDQN:[id number] 22:59:00





             Test Item    Value        Reference Range Interpretation Comments

 

             Proteus mirabilis (test Proteus mirabilis                          

 



             code = Proteus mirabilis)                                        



[facility] [id number] 22:59:00





             Test Item    Value        Reference Range Interpretation Comments

 

             Culture: Urine (test Holding For Better                           



             code = Culture: Urine) Growth                                 



[facility]:PT:ISOLATE:ORDQN:[id number] 22:59:00





             Test Item    Value        Reference Range Interpretation Comments

 

             Culture: Urine (test >100,000 CFU/mL Proteus                       

    



             code = Culture: mirabilis >100,000 CFU/mL                          

 



             Urine)       Providencia stuartii                           



[facility]PT:ISOLATE:ORDQN:[id number] 22:59:00





             Test Item    Value        Reference Range Interpretation Comments

 

             Providencia stuartii Providencia stuartii                          

 



             (test code = Providencia                                        



             stuartii)                                           



[facility]PT:ISOLATE:ORDQN:[id number] 22:59:00





             Test Item    Value        Reference Range Interpretation Comments

 

             Proteus mirabilis (test Proteus mirabilis                          

 



             code = Proteus mirabilis)                                        



[facility] [id number] 22:59:00





             Test Item    Value        Reference Range Interpretation Comments

 

             Culture: Urine (test Holding For Better                           



             code = Culture: Urine) Growth                                 



[facility]annNITROFURANTOIN:SUSC:PT:ISOLATE:ORDQN:[id number] 22:59:00





             Test Item    Value        Reference Range Interpretation Comments

 

             Culture: Urine (test >100,000 CFU/mL Proteus                       

    



             code = Culture: mirabilis >100,000 CFU/mL                          

 



             Urine)       Providencia stuartii                           



[facility]SUSC:PT:ISOLATE:ORDQN:[id number] 22:59:00





             Test Item    Value        Reference Range Interpretation Comments

 

             Providencia stuartii Providencia stuartii                          

 



             (test code = Providencia                                        



             stuartii)                                           



[facility]annNITROFURANTOIN:SUSC:PT:ISOLATE:ORDQN:[id number] 22:59:00





             Test Item    Value        Reference Range Interpretation Comments

 

             Proteus mirabilis (test Proteus mirabilis                          

 



             code = Proteus mirabilis)                                        



[facility]: [id number] 22:59:00





             Test Item    Value        Reference Range Interpretation Comments

 

             Culture: Urine (test Holding For Better                           



             code = Culture: Urine) Growth                                 



[facility]NITROFURANT[facility]IN:SUSC:PT:ISOLATE:ORDQN:[id number] 22:59:00





             Test Item    Value        Reference Range Interpretation Comments

 

             Culture: Urine (test >100,000 CFU/mL Proteus                       

    



             code = Culture: mirabilis >100,000 CFU/mL                          

 



             Urine)       Providencia stuartii                           



[facility]USC:PT:ISOLATE:ORDQN:[id number] 22:59:00





             Test Item    Value        Reference Range Interpretation Comments

 

             Providencia stuartii Providencia stuartii                          

 



             (test code = Providencia                                        



             stuartii)                                           



[facility]NITROFURANT[facility]IN:SUSC:PT:ISOLATE:ORDQN:[id number] 22:59:00





             Test Item    Value        Reference Range Interpretation Comments

 

             Proteus mirabilis (test Proteus mirabilis                          

 



             code = Proteus mirabilis)                                        



[facility]: [id number] 22:59:00





             Test Item    Value        Reference Range Interpretation Comments

 

             Culture: Urine (test Holding For Better                           



             code = Culture: Urine) Growth                                 



[facility]:SUSC:PT:ISOLATE:ORDQN:[id number] 22:59:00





             Test Item    Value        Reference Range Interpretation Comments

 

             Culture: Urine (test >100,000 CFU/mL Proteus                       

    



             code = Culture: mirabilis >100,000 CFU/mL                          

 



             Urine)       Providencia stuartii                           



[facility]NITROFURANT[facility]IN:SUSC:PT:ISOLATE:ORDQN:[id number] 22:59:00





             Test Item    Value        Reference Range Interpretation Comments

 

             Providencia stuartii Providencia stuartii                          

 



             (test code = Providencia                                        



             stuartii)                                           



[facility]annNITROFURANT[facility]IN:SUSC:PT:ISOLATE:ORDQN:[id number] 22:59:00





             Test Item    Value        Reference Range Interpretation Comments

 

             Proteus mirabilis (test Proteus mirabilis                          

 



             code = Proteus mirabilis)                                        



[facility] [id number] 22:59:00





             Test Item    Value        Reference Range Interpretation Comments

 

             Culture: Urine (test Holding For Better                           



             code = Culture: Urine) Growth                                 



[facility]2022-03-26 21:03:00





             Test Item    Value        Reference Range Interpretation Comments

 

             Glucose Lvl (test code = Glucose Lvl) 126          70-99           

          



[facility]2022-03-26 21:03:00





             Test Item    Value        Reference Range Interpretation Comments

 

             BUN (test code = BUN) 16           7-22                      



[facility]2022-03-26 21:03:00





             Test Item    Value        Reference Range Interpretation Comments

 

             Creatinine Lvl (test code = Creatinine 0.76         0.50-1.40      

           



             Lvl)                                                



[facility]2022-03-26 21:03:00





             Test Item    Value        Reference Range Interpretation Comments

 

             Sodium Lvl (test code = Sodium Lvl) 140          135-145           

        



[facility]2022-03-26 21:03:00





             Test Item    Value        Reference Range Interpretation Comments

 

             Potassium Lvl (test code = Potassium 3.3          3.5-5.1          

         



             Lvl)                                                



[facility]2022-03-26 21:03:00





             Test Item    Value        Reference Range Interpretation Comments

 

             Chloride Lvl (test code = Chloride Lvl) 111                  

            



[facility]2022-03-26 21:03:00





             Test Item    Value        Reference Range Interpretation Comments

 

             CO2 (test code = CO2) 22           24-32                     



[facility]2022-03-26 21:03:00





             Test Item    Value        Reference Range Interpretation Comments

 

             Calcium Lvl (test code = Calcium Lvl) 8.5          8.5-10.5        

          



[facility]2022-03-26 21:03:00





             Test Item    Value        Reference Range Interpretation Comments

 

             AGAP (test code = AGAP) 10.3         10.0-20.0                 



[facility]2022-03-26 21:03:00





             Test Item    Value        Reference Range Interpretation Comments

 

             eGFR (test code = eGFR) 117                                    



[facility]2022-03-26 21:03:00





             Test Item    Value        Reference Range Interpretation Comments

 

             Lactic Acid Lvl (test code = Lactic 1.1          0.5-2.2           

        



             Acid Lvl)                                           



[place] [id number] 21:03:00





             Test Item    Value        Reference Range Interpretation Comments

 

             Segs (test code = Segs) 68.9         45.0-75.0                 



[place] [id number] 21:03:00





             Test Item    Value        Reference Range Interpretation Comments

 

             Lymphocytes (test code = Lymphocytes) 20.4         20.0-40.0       

          



[place] [id number] 21:03:00





             Test Item    Value        Reference Range Interpretation Comments

 

             Monocytes (test code = Monocytes) 8.2          2.0-12.0            

      



[place] [id number] 21:03:00





             Test Item    Value        Reference Range Interpretation Comments

 

             Eosinophils (test code = 2.2          See_Comment                [A

utomated message] The



             Eosinophils)                                        system which ge

nerated



                                                                 this result tra

nsmitted



                                                                 reference range

: <=4.0.



                                                                 The reference r

zhen was



                                                                 not used to int

erpret



                                                                 this result as



                                                                 normal/abnormal

.



[place] [id number] 21:03:00





             Test Item    Value        Reference Range Interpretation Comments

 

             Basophils (test code = 0.3          See_Comment                [Aut

omated message] The



             Basophils)                                          system which ge

nerated



                                                                 this result tra

nsmitted



                                                                 reference range

: <=1.0.



                                                                 The reference r

zhen was



                                                                 not used to int

erpret



                                                                 this result as



                                                                 normal/abnormal

.



[place] 732852-03-26 21:03:00





             Test Item    Value        Reference Range Interpretation Comments

 

             Neutrophils # (test code = Neutrophils 5.5          1.5-8.1        

           



             #)                                                  



[place] 732852-03-26 21:03:00





             Test Item    Value        Reference Range Interpretation Comments

 

             Lymphocytes # (test code = Lymphocytes 1.6          1.0-5.5        

           



             #)                                                  



[place] 732852-03-26 21:03:00





             Test Item    Value        Reference Range Interpretation Comments

 

             Monocytes # (test code 0.7          See_Comment                [Aut

omated message] The



             = Monocytes #)                                        system which 

generated



                                                                 this result tra

nsmitted



                                                                 reference range

: <=0.8.



                                                                 The reference r

zhen was



                                                                 not used to int

erpret



                                                                 this result as



                                                                 normal/abnormal

.



[place] 732852-03-26 21:03:00





             Test Item    Value        Reference Range Interpretation Comments

 

             Eosinophils # (test code 0.2          See_Comment                [A

utomated message] The



             = Eosinophils #)                                        system whic

h generated



                                                                 this result tra

nsmitted



                                                                 reference range

: <=0.5.



                                                                 The reference r

zhen was



                                                                 not used to int

erpret



                                                                 this result as



                                                                 normal/abnormal

.



[id number]





             Test Item    Value        Reference Range Interpretation Comments

 

             WBC (test code = WBC) 8.0          3.7-10.4                  



[facility] [id number] 21:03:00





             Test Item    Value        Reference Range Interpretation Comments

 

             RBC (test code = RBC) 5.37         4.70-6.10                 



[facility] [id number] 21:03:00





             Test Item    Value        Reference Range Interpretation Comments

 

             Hgb (test code = Hgb) 15.9         14.0-18.0                 



[facility] [id number] 21:03:00





             Test Item    Value        Reference Range Interpretation Comments

 

             Hct (test code = Hct) 47.3         42.0-54.0                 



[facility] C[id number] 21:03:00





             Test Item    Value        Reference Range Interpretation Comments

 

             MCV (test code = MCV) 88.1         80.0-94.0                 



[facility] Cent[id number] 21:03:00





             Test Item    Value        Reference Range Interpretation Comments

 

             MCH (test code = MCH) 29.6 pg      27.0-31.0                 



[facility] [id number] 21:03:00





             Test Item    Value        Reference Range Interpretation Comments

 

             MCHC (test code = MCHC) 33.6         32.0-36.0                 



[facility] [id number] 21:03:00





             Test Item    Value        Reference Range Interpretation Comments

 

             RDW (test code = RDW) 15.3         11.5-14.5                 



[facility] CenterElbfgiiZZGOXYLGIX7509-51-97 21:03:00





             Test Item    Value        Reference Range Interpretation Comments

 

             Platelet (test code = Platelet) 370          133-450               

    



[facility] CenterYzhcsgwIIKBRHVUJR5866-07-10 21:03:00





             Test Item    Value        Reference Range Interpretation Comments

 

             MPV (test code = MPV) 8.1          7.4-10.4                  



[facility]2022-03-26 21:03:00





             Test Item    Value        Reference Range Interpretation Comments

 

             UA Comment 1 (test Suboptimal specimen                           



             code = UA Comment 1) received. Results may be                      

     



                          inaccurate due to the                           



                          age of the specimen.                           



                          Interpret results with                           



                          caution.                               



[facility]2022-03-26 21:03:00





             Test Item    Value        Reference Range Interpretation Comments

 

             UA Color (test code = Yellow *NA*(3/26/22                          

 



             UA Color)    4:03 PM)                               



[facility] [id number] 21:03:00





             Test Item    Value        Reference Range Interpretation Comments

 

             UA Turbidity (test code Slight *ABN*(3/26/22                       

    



             = UA Turbidity) 4:03 PM)                               



[facility] [id number] 21:03:00





             Test Item    Value        Reference Range Interpretation Comments

 

             UA Spec Grav (test code = UA Spec 1.015 1                          

      



             Grav)                                               



[facility] [id number] 21:03:00





             Test Item    Value        Reference Range Interpretation Comments

 

             UA pH (test code = UA pH) 5.0 1        5.0-8.0                   



[facility] [id number] 21:03:00





             Test Item    Value        Reference Range Interpretation Comments

 

             UA Protein (test code = UA Negative mg/dL                          

 



             Protein)                                            



[facility] [id number] 21:03:00





             Test Item    Value        Reference Range Interpretation Comments

 

             UA Glucose (test code = UA Negative mg/dL                          

 



             Glucose)                                            



[facility] [id number] 21:03:00





             Test Item    Value        Reference Range Interpretation Comments

 

             UA Ketones (test code = UA Negative mg/dL                          

 



             Ketones)                                            



[facility] [id number] 21:03:00





             Test Item    Value        Reference Range Interpretation Comments

 

             UA Bili (test code = Negative *NA*(3/26/22                         

  



             UA Bili)     4:03 PM)                               



[facility] [id number] 21:03:00





             Test Item    Value        Reference Range Interpretation Comments

 

             UA Blood (test code = Negative (3/26/22 4:03                       

    



             UA Blood)    PM)                                    



[facility] [id number] 21:03:00





             Test Item    Value        Reference Range Interpretation Comments

 

             UA Urobilinogen (test code = UA no gt        0.1-1.0               

    



             Urobilinogen)                                        



[facility] [id number] 21:03:00





             Test Item    Value        Reference Range Interpretation Comments

 

             UA Nitrite (test code Negative (3/26/22 4:03                       

    



             = UA Nitrite) PM)                                    



[facility] [id number] 21:03:00





             Test Item    Value        Reference Range Interpretation Comments

 

             UA Leuk Est (test code Large *ABN*(3/26/22                         

  



             = UA Leuk Est) 4:03 PM)                               



[facility] [id number] 21:03:00





             Test Item    Value        Reference Range Interpretation Comments

 

             UA WBC (test code = 64           See_Comment                [Automa

huma message] The



             UA WBC)                                             system which ge

nerated this



                                                                 result transmit

huma



                                                                 reference range

: <=5. The



                                                                 reference range

 was not



                                                                 used to interpr

et this



                                                                 result as zayda

l/abnormal.



[facility] [id number] 21:03:00





             Test Item    Value        Reference Range Interpretation Comments

 

             UA RBC (test code = 2            See_Comment                [Automa

huma message] The



             UA RBC)                                             system which ge

nerated this



                                                                 result transmit

huma



                                                                 reference range

: <=2. The



                                                                 reference range

 was not



                                                                 used to interpr

et this



                                                                 result as zayda

l/abnormal.



[facility] [id number] 21:03:00





             Test Item    Value        Reference Range Interpretation Comments

 

             UA Mucus (test code = UA Mucus) Few /LPF                           

    



[facility] [id number] 21:03:00





             Test Item    Value        Reference Range Interpretation Comments

 

             UA Sq Epi (test code = UA Sq Epi) None Seen                        

      



[facility]2022-03-26 21:03:00





             Test Item    Value        Reference Range Interpretation Comments

 

             Glucose Lvl (test code = Glucose Lvl) 126          70-99           

          



[facility] [id number]03[facility]





             Test Item    Value        Reference Range Interpretation Comments

 

             BUN (test code = BUN) 16           7-22                      



[facility] [id number] 21:03:00





             Test Item    Value        Reference Range Interpretation Comments

 

             Creatinine Lvl (test code = Creatinine 0.76         0.50-1.40      

           



             Lvl)                                                



[place] 924252-03-26 21:03:00





             Test Item    Value        Reference Range Interpretation Comments

 

             Sodium Lvl (test code = Sodium Lvl) 140          135-145           

        



[place] 924252-03-26 21:03:00





             Test Item    Value        Reference Range Interpretation Comments

 

             Potassium Lvl (test code = Potassium 3.3          3.5-5.1          

         



             Lvl)                                                



[place] 924252-03-26 21:03:00





             Test Item    Value        Reference Range Interpretation Comments

 

             Chloride Lvl (test code = Chloride Lvl) 111                  

            



[facility] [id number] 21:03:00





             Test Item    Value        Reference Range Interpretation Comments

 

             CO2 (test code = CO2) 22           24-32                     



[place] 924252-03-26 21:03:00





             Test Item    Value        Reference Range Interpretation Comments

 

             Calcium Lvl (test code = Calcium Lvl) 8.5          8.5-10.5        

          



[place] 924252-03-26 21:03:00





             Test Item    Value        Reference Range Interpretation Comments

 

             AGAP (test code = AGAP) 10.3         10.0-20.0                 



[place] 924252-03-26 21:03:00





             Test Item    Value        Reference Range Interpretation Comments

 

             eGFR (test code = eGFR) 117                                    



[place] 924252-03-26 21:03:00





             Test Item    Value        Reference Range Interpretation Comments

 

             Lactic Acid Lvl (test code = Lactic 1.1          0.5-2.2           

        



             Acid Lvl)                                           



[place] 732852-03-26 21:03:00





             Test Item    Value        Reference Range Interpretation Comments

 

             Segs (test code = Segs) 68.9         45.0-75.0                 



[place] 732852-03-26 21:03:00





             Test Item    Value        Reference Range Interpretation Comments

 

             Lymphocytes (test code = Lymphocytes) 20.4         20.0-40.0       

          



[place] [address]-03-26 21:03:00





             Test Item    Value        Reference Range Interpretation Comments

 

             Monocytes (test code = Monocytes) 8.2          2.0-12.0            

      



[place] 732852-03-26 21:03:00





             Test Item    Value        Reference Range Interpretation Comments

 

             Eosinophils (test code = 2.2          See_Comment                [A

utomated message] The



             Eosinophils)                                        system which ge

nerated



                                                                 this result tra

nsmitted



                                                                 reference range

: <=4.0.



                                                                 The reference r

zhen was



                                                                 not used to int

erpret



                                                                 this result as



                                                                 normal/abnormal

.



[place]32852-03-26 21:03:00





             Test Item    Value        Reference Range Interpretation Comments

 

             Basophils (test code = 0.3          See_Comment                [Aut

omated message] The



             Basophils)                                          system which ge

nerated



                                                                 this result tra

nsmitted



                                                                 reference range

: <=1.0.



                                                                 The reference r

zhen was



                                                                 not used to int

erpret



                                                                 this result as



                                                                 normal/abnormal

.



[place] 732852-03-26 21:03:00





             Test Item    Value        Reference Range Interpretation Comments

 

             Neutrophils # (test code = Neutrophils 5.5          1.5-8.1        

           



             #)                                                  



[place] 732852-03-26 21:03:00





             Test Item    Value        Reference Range Interpretation Comments

 

             Lymphocytes # (test code = Lymphocytes 1.6          1.0-5.5        

           



             #)                                                  



[place] 732852-03-26 21:03:00





             Test Item    Value        Reference Range Interpretation Comments

 

             Monocytes # (test code 0.7          See_Comment                [Aut

omated message] The



             = Monocytes #)                                        system which 

generated



                                                                 this result tra

nsmitted



                                                                 reference range

: <=0.8.



                                                                 The reference r

zhen was



                                                                 not used to int

erpret



                                                                 this result as



                                                                 normal/abnormal

.



[place] 732852-03-26 21:03:00





             Test Item    Value        Reference Range Interpretation Comments

 

             Eosinophils # (test code 0.2          See_Comment                [A

utomated message] The



             = Eosinophils #)                                        system whic

h generated



                                                                 this result tra

nsmitted



                                                                 reference range

: <=0.5.



                                                                 The reference r

zhen was



                                                                 not used to int

erpret



                                                                 this result as



                                                                 normal/abnormal

.



[id number]





             Test Item    Value        Reference Range Interpretation Comments

 

             WBC (test code = WBC) 8.0          3.7-10.4                  



[place] 732852-03-26 21:03:00





             Test Item    Value        Reference Range Interpretation Comments

 

             RBC (test code = RBC) 5.37         4.70-6.10                 



[place] 732852-03-26 21:03:00





             Test Item    Value        Reference Range Interpretation Comments

 

             Hgb (test code = Hgb) 15.9         14.0-18.0                 



[place] 732852-03-26 21:03:00





             Test Item    Value        Reference Range Interpretation Comments

 

             Hct (test code = Hct) 47.3         42.0-54.0                 



[place] [address]-03-26 21:03:00





             Test Item    Value        Reference Range Interpretation Comments

 

             MCV (test code = MCV) 88.1         80.0-94.0                 



[place] [address]-03-26 21:03:00





             Test Item    Value        Reference Range Interpretation Comments

 

             MCH (test code = MCH) 29.6 pg      27.0-31.0                 



[place] 732852-03-26 21:03:00





             Test Item    Value        Reference Range Interpretation Comments

 

             MCHC (test code = MCHC) 33.6         32.0-36.0                 



[facility] [id number] [facility]:03:00





             Test Item    Value        Reference Range Interpretation Comments

 

             RDW (test code = RDW) 15.3         11.5-14.5                 



[facility] CenterVizxjutBSOGFFXVWY7627-15-74 21:03:00





             Test Item    Value        Reference Range Interpretation Comments

 

             Platelet (test code = Platelet) 370          133-450               

    



[facility] C[id number] 21:03:00





             Test Item    Value        Reference Range Interpretation Comments

 

             MPV (test code = MPV) 8.1          7.4-10.4                  



[facility] AND [id number] 21:03:00





             Test Item    Value        Reference Range Interpretation Comments

 

             UA Comment 1 (test Suboptimal specimen                           



             code = UA Comment 1) received. Results may be                      

     



                          inaccurate due to the                           



                          age of the specimen.                           



                          Interpret results with                           



                          caution.                               



[facility] AND [id number] 21:03:00





             Test Item    Value        Reference Range Interpretation Comments

 

             UA Color (test code = Yellow *NA*(3/26/22                          

 



             UA Color)    4:03 PM)                               



[facility] AND [id number] 21:03:00





             Test Item    Value        Reference Range Interpretation Comments

 

             UA Turbidity (test code Slight *ABN*(3/26/22                       

    



             = UA Turbidity) 4:03 PM)                               



[facility] AND [id number] 21:03:00





             Test Item    Value        Reference Range Interpretation Comments

 

             UA Spec Grav (test code = UA Spec 1.015 1                          

      



             Grav)                                               



[facility] AND [id number] 21:03:00





             Test Item    Value        Reference Range Interpretation Comments

 

             UA pH (test code = UA pH) 5.0 1        5.0-8.0                   



[facility] AND [id number] 21:03:00





             Test Item    Value        Reference Range Interpretation Comments

 

             UA Protein (test code = UA Negative mg/dL                          

 



             Protein)                                            



[facility] AND [id number] 21:03:00





             Test Item    Value        Reference Range Interpretation Comments

 

             UA Glucose (test code = UA Negative mg/dL                          

 



             Glucose)                                            



[facility] AND [id number] 21:03:00





             Test Item    Value        Reference Range Interpretation Comments

 

             UA Ketones (test code = UA Negative mg/dL                          

 



             Ketones)                                            



[facility] AND [id number] 21:03:00





             Test Item    Value        Reference Range Interpretation Comments

 

             UA Bili (test code = Negative *NA*(3/26/22                         

  



             UA Bili)     4:03 PM)                               



[facility] [id number] 21:03:00





             Test Item    Value        Reference Range Interpretation Comments

 

             UA Blood (test code = Negative (3/26/22 4:03                       

    



             UA Blood)    PM)                                    



[facility] [id number] 21:03:00





             Test Item    Value        Reference Range Interpretation Comments

 

             UA Urobilinogen (test code = UA no gt        0.1-1.0               

    



             Urobilinogen)                                        



[facility] [id number] 21:03:00





             Test Item    Value        Reference Range Interpretation Comments

 

             UA Nitrite (test code Negative (3/26/22 4:03                       

    



             = UA Nitrite) PM)                                    



[facility] [id number] 21:03:00





             Test Item    Value        Reference Range Interpretation Comments

 

             UA Leuk Est (test code Large *ABN*(3/26/22                         

  



             = UA Leuk Est) 4:03 PM)                               



[facility] AND [id number] 21:03:00





             Test Item    Value        Reference Range Interpretation Comments

 

             UA WBC (test code = 64           See_Comment                [Automa

huma message] The



             UA WBC)                                             system which ge

nerated this



                                                                 result transmit

huma



                                                                 reference range

: <=5. The



                                                                 reference range

 was not



                                                                 used to interpr

et this



                                                                 result as zayda

l/abnormal.



[facility] [id number] 21:03:00





             Test Item    Value        Reference Range Interpretation Comments

 

             UA RBC (test code = 2            See_Comment                [Automa

huma message] The



             UA RBC)                                             system which ge

nerated this



                                                                 result transmit

huma



                                                                 reference range

: <=2. The



                                                                 reference range

 was not



                                                                 used to interpr

et this



                                                                 result as zayda

l/abnormal.



[facility] [id number] 21:03:00





             Test Item    Value        Reference Range Interpretation Comments

 

             UA Mucus (test code = UA Mucus) Few /LPF                           

    



[facility] [id number] 21:03:00





             Test Item    Value        Reference Range Interpretation Comments

 

             UA Sq Epi (test code = UA Sq Epi) None Seen                        

      



[facility] [id number] 21:03:00





             Test Item    Value        Reference Range Interpretation Comments

 

             Glucose Lvl (test code = Glucose Lvl) 126          70-99           

          



[facility] [id number] 21:03:00





             Test Item    Value        Reference Range Interpretation Comments

 

             BUN (test code = BUN) 16           7-22                      



[place] 924252-03-26 21:03:00





             Test Item    Value        Reference Range Interpretation Comments

 

             Creatinine Lvl (test code = Creatinine 0.76         0.50-1.40      

           



             Lvl)                                                



[place] 924252-03-26 21:03:00





             Test Item    Value        Reference Range Interpretation Comments

 

             Sodium Lvl (test code = Sodium Lvl) 140          135-145           

        



[place] 924252-03-26 21:03:00





             Test Item    Value        Reference Range Interpretation Comments

 

             Potassium Lvl (test code = Potassium 3.3          3.5-5.1          

         



             Lvl)                                                



[place] 924252-03-26 21:03:00





             Test Item    Value        Reference Range Interpretation Comments

 

             Chloride Lvl (test code = Chloride Lvl) 111                  

            



[place] 924252-03-26 21:03:00





             Test Item    Value        Reference Range Interpretation Comments

 

             CO2 (test code = CO2) 22           24-32                     



[place] 924252-03-26 21:03:00





             Test Item    Value        Reference Range Interpretation Comments

 

             Calcium Lvl (test code = Calcium Lvl) 8.5          8.5-10.5        

          



[place] 924252-03-26 21:03:00





             Test Item    Value        Reference Range Interpretation Comments

 

             AGAP (test code = AGAP) 10.3         10.0-20.0                 



[facility]2022-03-26 21:03:00





             Test Item    Value        Reference Range Interpretation Comments

 

             eGFR (test code = eGFR) 117                                    



[place] 924252-03-26 21:03:00





             Test Item    Value        Reference Range Interpretation Comments

 

             Lactic Acid Lvl (test code = Lactic 1.1          0.5-2.2           

        



             Acid Lvl)                                           



[place] 732852-03-26 21:03:00





             Test Item    Value        Reference Range Interpretation Comments

 

             Segs (test code = Segs) 68.9         45.0-75.0                 



[place] 732852-03-26 21:03:00





             Test Item    Value        Reference Range Interpretation Comments

 

             Lymphocytes (test code = Lymphocytes) 20.4         20.0-40.0       

          



[place] 732852-03-26 21:03:00





             Test Item    Value        Reference Range Interpretation Comments

 

             Monocytes (test code = Monocytes) 8.2          2.0-12.0            

      



[place] 732852-03-26 21:03:00





             Test Item    Value        Reference Range Interpretation Comments

 

             Eosinophils (test code = 2.2          See_Comment                [A

utomated message] The



             Eosinophils)                                        system which ge

nerated



                                                                 this result tra

nsmitted



                                                                 reference range

: <=4.0.



                                                                 The reference r

zhen was



                                                                 not used to int

erpret



                                                                 this result as



                                                                 normal/abnormal

.



[place] 732852-03-26 21:03:00





             Test Item    Value        Reference Range Interpretation Comments

 

             Basophils (test code = 0.3          See_Comment                [Aut

omated message] The



             Basophils)                                          system which ge

nerated



                                                                 this result tra

nsmitted



                                                                 reference range

: <=1.0.



                                                                 The reference r

zhen was



                                                                 not used to int

erpret



                                                                 this result as



                                                                 normal/abnormal

.



[place] 732852-03-26 21:03:00





             Test Item    Value        Reference Range Interpretation Comments

 

             Neutrophils # (test code = Neutrophils 5.5          1.5-8.1        

           



             #)                                                  



[place] 732852-03-26 21:03:00





             Test Item    Value        Reference Range Interpretation Comments

 

             Lymphocytes # (test code = Lymphocytes 1.6          1.0-5.5        

           



             #)                                                  



[place] 732852-03-26 21:03:00





             Test Item    Value        Reference Range Interpretation Comments

 

             Monocytes # (test code 0.7          See_Comment                [Aut

omated message] The



             = Monocytes #)                                        system which 

generated



                                                                 this result tra

nsmitted



                                                                 reference range

: <=0.8.



                                                                 The reference r

zhen was



                                                                 not used to int

erpret



                                                                 this result as



                                                                 normal/abnormal

.



[place] 732852-03-26 21:03:00





             Test Item    Value        Reference Range Interpretation Comments

 

             Eosinophils # (test code 0.2          See_Comment                [A

utomated message] The



             = Eosinophils #)                                        system whic

h generated



                                                                 this result tra

nsmitted



                                                                 reference range

: <=0.5.



                                                                 The reference r

zhen was



                                                                 not used to int

erpret



                                                                 this result as



                                                                 normal/abnormal

.



[id number]





             Test Item    Value        Reference Range Interpretation Comments

 

             WBC (test code = WBC) 8.0          3.7-10.4                  



[place] 732852-03-26 21:03:00





             Test Item    Value        Reference Range Interpretation Comments

 

             RBC (test code = RBC) 5.37         4.70-6.10                 



[place] 732852-03-26 21:03:00





             Test Item    Value        Reference Range Interpretation Comments

 

             Hgb (test code = Hgb) 15.9         14.0-18.0                 



[facility] [id number] 21:03:00





             Test Item    Value        Reference Range Interpretation Comments

 

             Hct (test code = Hct) 47.3         42.0-54.0                 



[facility] [id number] 21:03:00





             Test Item    Value        Reference Range Interpretation Comments

 

             MCV (test code = MCV) 88.1         80.0-94.0                 



[facility] CenterL[id number] 21:03:00





             Test Item    Value        Reference Range Interpretation Comments

 

             MCH (test code = MCH) 29.6 pg      27.0-31.0                 



[facility] CenterTzrlysuWPTVJVWCRJ1914-93-17 21:03:00





             Test Item    Value        Reference Range Interpretation Comments

 

             MCHC (test code = MCHC) 33.6         32.0-36.0                 



[facility] CenterJuqcbweJESRTMOCBZ9120-03-85 21:03:00





             Test Item    Value        Reference Range Interpretation Comments

 

             RDW (test code = RDW) 15.3         11.5-14.5                 



[facility] CenterVciwepzAHHEQUJTHR3123-88-65 21:03:00





             Test Item    Value        Reference Range Interpretation Comments

 

             Platelet (test code = Platelet) 370          133-450               

    



[facility] CenterDpbqycqZHBMRERUVB6774-89-00 21:03:00





             Test Item    Value        Reference Range Interpretation Comments

 

             MPV (test code = MPV) 8.1          7.4-10.4                  



[facility] AND [id number] 21:03:00





             Test Item    Value        Reference Range Interpretation Comments

 

             UA Comment 1 (test Suboptimal specimen                           



             code = UA Comment 1) received. Results may be                      

     



                          inaccurate due to the                           



                          age of the specimen.                           



                          Interpret results with                           



                          caution.                               



[facility] AND [id number] 21:03:00





             Test Item    Value        Reference Range Interpretation Comments

 

             UA Color (test code = Yellow *NA*(3/26/22                          

 



             UA Color)    4:03 PM)                               



[facility] AND [id number] 21:03:00





             Test Item    Value        Reference Range Interpretation Comments

 

             UA Turbidity (test code Slight *ABN*(3/26/22                       

    



             = UA Turbidity) 4:03 PM)                               



[facility] AND [id number] 21:03:00





             Test Item    Value        Reference Range Interpretation Comments

 

             UA Spec Grav (test code = UA Spec 1.015 1                          

      



             Grav)                                               



[facility] AND [id number] 21:03:00





             Test Item    Value        Reference Range Interpretation Comments

 

             UA pH (test code = UA pH) 5.0 1        5.0-8.0                   



[facility] AND [id number] 21:03:00





             Test Item    Value        Reference Range Interpretation Comments

 

             UA Protein (test code = UA Negative mg/dL                          

 



             Protein)                                            



[facility] AND [id number] 21:03:00





             Test Item    Value        Reference Range Interpretation Comments

 

             UA Glucose (test code = UA Negative mg/dL                          

 



             Glucose)                                            



[facility] AND [id number] 21:03:00





             Test Item    Value        Reference Range Interpretation Comments

 

             UA Ketones (test code = UA Negative mg/dL                          

 



             Ketones)                                            



[facility] AND [id number] 21:03:00





             Test Item    Value        Reference Range Interpretation Comments

 

             UA Bili (test code = Negative *NA*(3/26/22                         

  



             UA Bili)     4:03 PM)                               



[facility] [facility] [id number] 21:03:00





             Test Item    Value        Reference Range Interpretation Comments

 

             UA Blood (test code = Negative (3/26/22 4:03                       

    



             UA Blood)    PM)                                    



[facility] AND [id number] 21:03:00





             Test Item    Value        Reference Range Interpretation Comments

 

             UA Urobilinogen (test code = UA no gt        0.1-1.0               

    



             Urobilinogen)                                        



[facility] AND [id number] 21:03:00





             Test Item    Value        Reference Range Interpretation Comments

 

             UA Nitrite (test code Negative (3/26/22 4:03                       

    



             = UA Nitrite) PM)                                    



[facility] AND [id number] 21:03:00





             Test Item    Value        Reference Range Interpretation Comments

 

             UA Leuk Est (test code Large *ABN*(3/26/22                         

  



             = UA Leuk Est) 4:03 PM)                               



[facility] AND [id number] 21:03:00





             Test Item    Value        Reference Range Interpretation Comments

 

             UA WBC (test code = 64           See_Comment                [Automa

huma message] The



             UA WBC)                                             system which ge

nerated this



                                                                 result transmit

huma



                                                                 reference range

: <=5. The



                                                                 reference range

 was not



                                                                 used to interpr

et this



                                                                 result as zayda

l/abnormal.



[facility] AND [id number] 21:03:00





             Test Item    Value        Reference Range Interpretation Comments

 

             UA RBC (test code = 2            See_Comment                [Automa

huma message] The



             UA RBC)                                             system which ge

nerated this



                                                                 result transmit

huma



                                                                 reference range

: <=2. The



                                                                 reference range

 was not



                                                                 used to interpr

et this



                                                                 result as zayda

l/abnormal.



[facility] AND [id number] 21:03:00





             Test Item    Value        Reference Range Interpretation Comments

 

             UA Mucus (test code = UA Mucus) Few /LPF                           

    



[facility] AND [id number] 21:03:00





             Test Item    Value        Reference Range Interpretation Comments

 

             UA Sq Epi (test code = UA Sq Epi) None Seen                        

      



[facility]2022-03-26 21:03:00





             Test Item    Value        Reference Range Interpretation Comments

 

             Glucose Lvl (test code = Glucose Lvl) 126          70-99           

          



[facility]2022-03-26 21:03:00





             Test Item    Value        Reference Range Interpretation Comments

 

             BUN (test code = BUN) 16           7-22                      



[place]252-03-26 21:03:00





             Test Item    Value        Reference Range Interpretation Comments

 

             Creatinine Lvl (test code = Creatinine 0.76         0.50-1.40      

           



             Lvl)                                                



[facility]2022-03-26 21:03:00





             Test Item    Value        Reference Range Interpretation Comments

 

             Sodium Lvl (test code = Sodium Lvl) 140          135-145           

        



[facility]2022-03-26 21:03:00





             Test Item    Value        Reference Range Interpretation Comments

 

             Potassium Lvl (test code = Potassium 3.3          3.5-5.1          

         



             Lvl)                                                



[facility]2022-03-26 21:03:00





             Test Item    Value        Reference Range Interpretation Comments

 

             Chloride Lvl (test code = Chloride Lvl) 111                  

            



[facility]2022-03-26 21:03:00





             Test Item    Value        Reference Range Interpretation Comments

 

             CO2 (test code = CO2) 22           24-32                     



[facility]2022-03-26 21:03:00





             Test Item    Value        Reference Range Interpretation Comments

 

             Calcium Lvl (test code = Calcium Lvl) 8.5          8.5-10.5        

          



[place] 924252-03-26 21:03:00





             Test Item    Value        Reference Range Interpretation Comments

 

             AGAP (test code = AGAP) 10.3         10.0-20.0                 



[place] 924252-03-26 21:03:00





             Test Item    Value        Reference Range Interpretation Comments

 

             eGFR (test code = eGFR) 117                                    



[facility]2022-03-26 21:03:00





             Test Item    Value        Reference Range Interpretation Comments

 

             Lactic Acid Lvl (test code = Lactic 1.1          0.5-2.2           

        



             Acid Lvl)                                           



[id number]





             Test Item    Value        Reference Range Interpretation Comments

 

             Segs (test code = Segs) 68.9         45.0-75.0                 



[id number] 21:03:00





             Test Item    Value        Reference Range Interpretation Comments

 

             Lymphocytes (test code = Lymphocytes) 20.4         20.0-40.0       

          



[place] 732852-03-26 21:03:00





             Test Item    Value        Reference Range Interpretation Comments

 

             Monocytes (test code = Monocytes) 8.2          2.0-12.0            

      



[place] 732852-03-26 21:03:00





             Test Item    Value        Reference Range Interpretation Comments

 

             Eosinophils (test code = 2.2          See_Comment                [A

utomated message] The



             Eosinophils)                                        system which ge

nerated



                                                                 this result tra

nsmitted



                                                                 reference range

: <=4.0.



                                                                 The reference r

zhen was



                                                                 not used to int

erpret



                                                                 this result as



                                                                 normal/abnormal

.



[place] 732852-03-26 21:03:00





             Test Item    Value        Reference Range Interpretation Comments

 

             Basophils (test code = 0.3          See_Comment                [Aut

omated message] The



             Basophils)                                          system which ge

nerated



                                                                 this result tra

nsmitted



                                                                 reference range

: <=1.0.



                                                                 The reference r

zhen was



                                                                 not used to int

erpret



                                                                 this result as



                                                                 normal/abnormal

.



[id number] [facility]





             Test Item    Value        Reference Range Interpretation Comments

 

             Neutrophils # (test code = Neutrophils 5.5          1.5-8.1        

           



             #)                                                  



[place] [address]-03-26 21:03:00





             Test Item    Value        Reference Range Interpretation Comments

 

             Lymphocytes # (test code = Lymphocytes 1.6          1.0-5.5        

           



             #)                                                  



[place] 732852-03-26 21:03:00





             Test Item    Value        Reference Range Interpretation Comments

 

             Monocytes # (test code 0.7          See_Comment                [Aut

omated message] The



             = Monocytes #)                                        system which 

generated



                                                                 this result tra

nsmitted



                                                                 reference range

: <=0.8.



                                                                 The reference r

zhen was



                                                                 not used to int

erpret



                                                                 this result as



                                                                 normal/abnormal

.



[facility] [id number] 21:03:00





             Test Item    Value        Reference Range Interpretation Comments

 

             Eosinophils # (test code 0.2          See_Comment                [A

utomated message] The



             = Eosinophils #)                                        system whic

h generated



                                                                 this result tra

nsmitted



                                                                 reference range

: <=0.5.



                                                                 The reference r

zhen was



                                                                 not used to int

erpret



                                                                 this result as



                                                                 normal/abnormal

.



[facility] [id number] 21:03:00





             Test Item    Value        Reference Range Interpretation Comments

 

             WBC (test code = WBC) 8.0          3.7-10.4                  



[facility] [id number] 21:03:00





             Test Item    Value        Reference Range Interpretation Comments

 

             RBC (test code = RBC) 5.37         4.70-6.10                 



[facility] [id number] 21:03:00





             Test Item    Value        Reference Range Interpretation Comments

 

             Hgb (test code = Hgb) 15.9         14.0-18.0                 



[facility] [id number] 21:03:00





             Test Item    Value        Reference Range Interpretation Comments

 

             Hct (test code = Hct) 47.3         42.0-54.0                 



[facility] Cent[id number] 21:03:00





             Test Item    Value        Reference Range Interpretation Comments

 

             MCV (test code = MCV) 88.1         80.0-94.0                 



[facility] Cent[id number] 21:03:00





             Test Item    Value        Reference Range Interpretation Comments

 

             MCH (test code = MCH) 29.6 pg      27.0-31.0                 



[facility] [id number] 21:03:00





             Test Item    Value        Reference Range Interpretation Comments

 

             MCHC (test code = MCHC) 33.6         32.0-36.0                 



[facility] [id number] 21:03:00





             Test Item    Value        Reference Range Interpretation Comments

 

             RDW (test code = RDW) 15.3         11.5-14.5                 



[facility] CenterEfjtuuhSTZRLXIGVP2357-29-19 21:03:00





             Test Item    Value        Reference Range Interpretation Comments

 

             Platelet (test code = Platelet) 370          133-450               

    



[facility] Cent[id number] 21:03:00





             Test Item    Value        Reference Range Interpretation Comments

 

             MPV (test code = MPV) 8.1          7.4-10.4                  



[facility] AND [id number] 21:03:00





             Test Item    Value        Reference Range Interpretation Comments

 

             UA Comment 1 (test Suboptimal specimen                           



             code = UA Comment 1) received. Results may be                      

     



                          inaccurate due to the                           



                          age of the specimen.                           



                          Interpret results with                           



                          caution.                               



[facility] AND [id number] 21:03:00





             Test Item    Value        Reference Range Interpretation Comments

 

             UA Color (test code = Yellow *NA*(3/26/22                          

 



             UA Color)    4:03 PM)                               



[facility] AND [id number] 21:03:00





             Test Item    Value        Reference Range Interpretation Comments

 

             UA Turbidity (test code Slight *ABN*(3/26/22                       

    



             = UA Turbidity) 4:03 PM)                               



[facility] AND [id number] 21:03:00





             Test Item    Value        Reference Range Interpretation Comments

 

             UA Spec Grav (test code = UA Spec 1.015 1                          

      



             Grav)                                               



[facility] AND [id number] 21:03:00





             Test Item    Value        Reference Range Interpretation Comments

 

             UA pH (test code = UA pH) 5.0 1        5.0-8.0                   



[facility] AND [id number] 21:03:00





             Test Item    Value        Reference Range Interpretation Comments

 

             UA Protein (test code = UA Negative mg/dL                          

 



             Protein)                                            



[facility] AND [id number] 21:03:00





             Test Item    Value        Reference Range Interpretation Comments

 

             UA Glucose (test code = UA Negative mg/dL                          

 



             Glucose)                                            



[facility] AND [id number] 21:03:00





             Test Item    Value        Reference Range Interpretation Comments

 

             UA Ketones (test code = UA Negative mg/dL                          

 



             Ketones)                                            



[facility] AND [id number] 21:03:00





             Test Item    Value        Reference Range Interpretation Comments

 

             UA Bili (test code = Negative *NA*(3/26/22                         

  



             UA Bili)     4:03 PM)                               



[facility] AND [id number] 21:03:00





             Test Item    Value        Reference Range Interpretation Comments

 

             UA Blood (test code = Negative (3/26/22 4:03                       

    



             UA Blood)    PM)                                    



[facility] AND [id number] 21:03:00





             Test Item    Value        Reference Range Interpretation Comments

 

             UA Urobilinogen (test code = UA no gt        0.1-1.0               

    



             Urobilinogen)                                        



[facility] AND [id number] 21:03:00





             Test Item    Value        Reference Range Interpretation Comments

 

             UA Nitrite (test code Negative (3/26/22 4:03                       

    



             = UA Nitrite) PM)                                    



[facility] AND [id number] 21:03:00





             Test Item    Value        Reference Range Interpretation Comments

 

             UA Leuk Est (test code Large *ABN*(3/26/22                         

  



             = UA Leuk Est) 4:03 PM)                               



[facility] AND [id number] 21:03:00





             Test Item    Value        Reference Range Interpretation Comments

 

             UA WBC (test code = 64           See_Comment                [Automa

huma message] The



             UA WBC)                                             system which ge

nerated this



                                                                 result transmit

huma



                                                                 reference range

: <=5. The



                                                                 reference range

 was not



                                                                 used to interpr

et this



                                                                 result as zayda

l/abnormal.



[facility] AND [id number] 21:03:00





             Test Item    Value        Reference Range Interpretation Comments

 

             UA RBC (test code = 2            See_Comment                [Automa

huma message] The



             UA RBC)                                             system which ge

nerated this



                                                                 result transmit

huma



                                                                 reference range

: <=2. The



                                                                 reference range

 was not



                                                                 used to interpr

et this



                                                                 result as zayda

l/abnormal.



[facility] AND [id number] 21:03:00





             Test Item    Value        Reference Range Interpretation Comments

 

             UA Mucus (test code = UA Mucus) Few /LPF                           

    



[facility] AND [id number] 21:03:00





             Test Item    Value        Reference Range Interpretation Comments

 

             UA Sq Epi (test code = UA Sq Epi) None Seen                        

      



[facility]2022-03-26 21:03:00





             Test Item    Value        Reference Range Interpretation Comments

 

             Glucose Lvl (test code = Glucose Lvl) 126          70-99           

          



[facility]2022-03-26 21:03:00





             Test Item    Value        Reference Range Interpretation Comments

 

             BUN (test code = BUN) 16           7-22                      



[place] 924252-03-26 21:03:00





             Test Item    Value        Reference Range Interpretation Comments

 

             Creatinine Lvl (test code = Creatinine 0.76         0.50-1.40      

           



             Lvl)                                                



[place] 924252-03-26 21:03:00





             Test Item    Value        Reference Range Interpretation Comments

 

             Sodium Lvl (test code = Sodium Lvl) 140          135-145           

        



[facility]2022-03-26 21:03:00





             Test Item    Value        Reference Range Interpretation Comments

 

             Potassium Lvl (test code = Potassium 3.3          3.5-5.1          

         



             Lvl)                                                



[place] 924252-03-26 21:03:00





             Test Item    Value        Reference Range Interpretation Comments

 

             Chloride Lvl (test code = Chloride Lvl) 111                  

            



[place] 924252-03-26 21:03:00





             Test Item    Value        Reference Range Interpretation Comments

 

             CO2 (test code = CO2) 22           24-32                     



[place] 924252-03-26 21:03:00





             Test Item    Value        Reference Range Interpretation Comments

 

             Calcium Lvl (test code = Calcium Lvl) 8.5          8.5-10.5        

          



[place] 924252-03-26 21:03:00





             Test Item    Value        Reference Range Interpretation Comments

 

             AGAP (test code = AGAP) 10.3         10.0-20.0                 



[place] 924252-03-26 21:03:00





             Test Item    Value        Reference Range Interpretation Comments

 

             eGFR (test code = eGFR) 117                                    



[place] 924252-03-26 21:03:00





             Test Item    Value        Reference Range Interpretation Comments

 

             Lactic Acid Lvl (test code = Lactic 1.1          0.5-2.2           

        



             Acid Lvl)                                           



[place] 732852-03-26 21:03:00





             Test Item    Value        Reference Range Interpretation Comments

 

             Segs (test code = Segs) 68.9         45.0-75.0                 



[place] 732852-03-26 21:03:00





             Test Item    Value        Reference Range Interpretation Comments

 

             Lymphocytes (test code = Lymphocytes) 20.4         20.0-40.0       

          



[place] 732852-03-26 21:03:00





             Test Item    Value        Reference Range Interpretation Comments

 

             Monocytes (test code = Monocytes) 8.2          2.0-12.0            

      



[place] [address]-03-26 21:03:00





             Test Item    Value        Reference Range Interpretation Comments

 

             Eosinophils (test code = 2.2          See_Comment                [A

utomated message] The



             Eosinophils)                                        system which ge

nerated



                                                                 this result tra

nsmitted



                                                                 reference range

: <=4.0.



                                                                 The reference r

zhen was



                                                                 not used to int

erpret



                                                                 this result as



                                                                 normal/abnormal

.



[place] 732852-03-26 21:03:00





             Test Item    Value        Reference Range Interpretation Comments

 

             Basophils (test code = 0.3          See_Comment                [Aut

omated message] The



             Basophils)                                          system which ge

nerated



                                                                 this result tra

nsmitted



                                                                 reference range

: <=1.0.



                                                                 The reference r

zhen was



                                                                 not used to int

erpret



                                                                 this result as



                                                                 normal/abnormal

.



[place] 732852-03-26 21:03:00





             Test Item    Value        Reference Range Interpretation Comments

 

             Neutrophils # (test code = Neutrophils 5.5          1.5-8.1        

           



             #)                                                  



[place] 732852-03-26 21:03:00





             Test Item    Value        Reference Range Interpretation Comments

 

             Lymphocytes # (test code = Lymphocytes 1.6          1.0-5.5        

           



             #)                                                  



[place] 732852-03-26 21:03:00





             Test Item    Value        Reference Range Interpretation Comments

 

             Monocytes # (test code 0.7          See_Comment                [Aut

omated message] The



             = Monocytes #)                                        system which 

generated



                                                                 this result tra

nsmitted



                                                                 reference range

: <=0.8.



                                                                 The reference r

zhen was



                                                                 not used to int

erpret



                                                                 this result as



                                                                 normal/abnormal

.



[place] 732852-03-26 21:03:00





             Test Item    Value        Reference Range Interpretation Comments

 

             Eosinophils # (test code 0.2          See_Comment                [A

utomated message] The



             = Eosinophils #)                                        system whic

h generated



                                                                 this result tra

nsmitted



                                                                 reference range

: <=0.5.



                                                                 The reference r

zhen was



                                                                 not used to int

erpret



                                                                 this result as



                                                                 normal/abnormal

.



[facility]





             Test Item    Value        Reference Range Interpretation Comments

 

             WBC (test code = WBC) 8.0          3.7-10.4                  



[place] 732852-03-26 21:03:00





             Test Item    Value        Reference Range Interpretation Comments

 

             RBC (test code = RBC) 5.37         4.70-6.10                 



[place] [address]-03-26 21:03:00





             Test Item    Value        Reference Range Interpretation Comments

 

             Hgb (test code = Hgb) 15.9         14.0-18.0                 



[place] [address]-03-26 21:03:00





             Test Item    Value        Reference Range Interpretation Comments

 

             Hct (test code = Hct) 47.3         42.0-54.0                 



[place] [address]-03-26 21:03:00





             Test Item    Value        Reference Range Interpretation Comments

 

             MCV (test code = MCV) 88.1         80.0-94.0                 



[place] 732852-03-26 21:03:00





             Test Item    Value        Reference Range Interpretation Comments

 

             MCH (test code = MCH) 29.6 pg      27.0-31.0                 



[id number]





             Test Item    Value        Reference Range Interpretation Comments

 

             MCHC (test code = MCHC) 33.6         32.0-36.0                 



[facility]





             Test Item    Value        Reference Range Interpretation Comments

 

             RDW (test code = RDW) 15.3         11.5-14.5                 



[facility]rs[facility]DSQDZBDAOH0274-36-72 21:03:00





             Test Item    Value        Reference Range Interpretation Comments

 

             Platelet (test code = Platelet) 370          133-450               

    



[facility]TdsoepqTAPFAVLWSH2769-50-76 21:03:00





             Test Item    Value        Reference Range Interpretation Comments

 

             MPV (test code = MPV) 8.1          7.4-10.4                  



[facility] AND [id number] 21:03:00





             Test Item    Value        Reference Range Interpretation Comments

 

             UA Comment 1 (test Suboptimal specimen                           



             code = UA Comment 1) received. Results may be                      

     



                          inaccurate due to the                           



                          age of the specimen.                           



                          Interpret results with                           



                          caution.                               



[facility] [id number] 21:03:00





             Test Item    Value        Reference Range Interpretation Comments

 

             UA Color (test code = Yellow *NA*(3/26/22                          

 



             UA Color)    4:03 PM)                               



[facility] AND [id number] 21:03:00





             Test Item    Value        Reference Range Interpretation Comments

 

             UA Turbidity (test code Slight *ABN*(3/26/22                       

    



             = UA Turbidity) 4:03 PM)                               



[facility] AND [id number] 21:03:00





             Test Item    Value        Reference Range Interpretation Comments

 

             UA Spec Grav (test code = UA Spec 1.015 1                          

      



             Grav)                                               



[facility] [id number] 21:03:00





             Test Item    Value        Reference Range Interpretation Comments

 

             UA pH (test code = UA pH) 5.0 1        5.0-8.0                   



[facility] [id number] 21:03:00





             Test Item    Value        Reference Range Interpretation Comments

 

             UA Protein (test code = UA Negative mg/dL                          

 



             Protein)                                            



[facility] AND [id number] 21:03:00





             Test Item    Value        Reference Range Interpretation Comments

 

             UA Glucose (test code = UA Negative mg/dL                          

 



             Glucose)                                            



[facility] [id number]:03:00





             Test Item    Value        Reference Range Interpretation Comments

 

             UA Ketones (test code = UA Negative mg/dL                          

 



             Ketones)                                            



[facility] [id number] 21:03:00





             Test Item    Value        Reference Range Interpretation Comments

 

             UA Bili (test code = Negative *NA*(3/26/22                         

  



             UA Bili)     4:03 PM)                               



[facility] [id number] 21:03:00





             Test Item    Value        Reference Range Interpretation Comments

 

             UA Blood (test code = Negative (3/26/22 4:03                       

    



             UA Blood)    PM)                                    



[facility] AND [id number] 21:03:00





             Test Item    Value        Reference Range Interpretation Comments

 

             UA Urobilinogen (test code = UA no gt        0.1-1.0               

    



             Urobilinogen)                                        



[facility] AND [id number] 21:03:00





             Test Item    Value        Reference Range Interpretation Comments

 

             UA Nitrite (test code Negative (3/26/22 4:03                       

    



             = UA Nitrite) PM)                                    



[facility] AND [id number] 21:03:00





             Test Item    Value        Reference Range Interpretation Comments

 

             UA Leuk Est (test code Large *ABN*(3/26/22                         

  



             = UA Leuk Est) 4:03 PM)                               



[facility] AND [id number] 21:03:00





             Test Item    Value        Reference Range Interpretation Comments

 

             UA WBC (test code = 64           See_Comment                [Automa

huma message] The



             UA WBC)                                             system which ge

nerated this



                                                                 result transmit

huma



                                                                 reference range

: <=5. The



                                                                 reference range

 was not



                                                                 used to interpr

et this



                                                                 result as zayda

l/abnormal.



[facility] [id number] 21:03:00





             Test Item    Value        Reference Range Interpretation Comments

 

             UA RBC (test code = 2            See_Comment                [Automa

huma message] The



             UA RBC)                                             system which ge

nerated this



                                                                 result transmit

huma



                                                                 reference range

: <=2. The



                                                                 reference range

 was not



                                                                 used to interpr

et this



                                                                 result as zayda

l/abnormal.



[facility] [id number] 21:03:00





             Test Item    Value        Reference Range Interpretation Comments

 

             UA Mucus (test code = UA Mucus) Few /LPF                           

    



[facility] AND [id number] 21:03:00





             Test Item    Value        Reference Range Interpretation Comments

 

             UA Sq Epi (test code = UA Sq Epi) None Seen                        

      



[place] 924252-03-26 21:03:00





             Test Item    Value        Reference Range Interpretation Comments

 

             Glucose Lvl (test code = Glucose Lvl) 126          70-99           

          



[place] 924252-03-26 21:03:00





             Test Item    Value        Reference Range Interpretation Comments

 

             BUN (test code = BUN) 16           7-22                      



[place] 924252-03-26 21:03:00





             Test Item    Value        Reference Range Interpretation Comments

 

             Creatinine Lvl (test code = Creatinine 0.76         0.50-1.40      

           



             Lvl)                                                



[place] 924252-03-26 21:03:00





             Test Item    Value        Reference Range Interpretation Comments

 

             Sodium Lvl (test code = Sodium Lvl) 140          135-145           

        



[place] 924252-03-26 21:03:00





             Test Item    Value        Reference Range Interpretation Comments

 

             Potassium Lvl (test code = Potassium 3.3          3.5-5.1          

         



             Lvl)                                                



[place] 924252-03-26 21:03:00





             Test Item    Value        Reference Range Interpretation Comments

 

             Chloride Lvl (test code = Chloride Lvl) 111                  

            



[place] 924252-03-26 21:03:00





             Test Item    Value        Reference Range Interpretation Comments

 

             CO2 (test code = CO2) 22           24-32                     



[place] 924252-03-26 21:03:00





             Test Item    Value        Reference Range Interpretation Comments

 

             Calcium Lvl (test code = Calcium Lvl) 8.5          8.5-10.5        

          



[place] 924252-03-26 21:03:00





             Test Item    Value        Reference Range Interpretation Comments

 

             AGAP (test code = AGAP) 10.3         10.0-20.0                 



[place] 924252-03-26 21:03:00





             Test Item    Value        Reference Range Interpretation Comments

 

             eGFR (test code = eGFR) 117                                    



[place] 924252-03-26 21:03:00





             Test Item    Value        Reference Range Interpretation Comments

 

             Lactic Acid Lvl (test code = Lactic 1.1          0.5-2.2           

        



             Acid Lvl)                                           



[place] 732852-03-26 21:03:00





             Test Item    Value        Reference Range Interpretation Comments

 

             Segs (test code = Segs) 68.9         45.0-75.0                 



[place] 732852-03-26 21:03:00





             Test Item    Value        Reference Range Interpretation Comments

 

             Lymphocytes (test code = Lymphocytes) 20.4         20.0-40.0       

          



[place] 732852-03-26 21:03:00





             Test Item    Value        Reference Range Interpretation Comments

 

             Monocytes (test code = Monocytes) 8.2          2.0-12.0            

      



[place] 732852-03-26 21:03:00





             Test Item    Value        Reference Range Interpretation Comments

 

             Eosinophils (test code = 2.2          See_Comment                [A

utomated message] The



             Eosinophils)                                        system which ge

nerated



                                                                 this result tra

nsmitted



                                                                 reference range

: <=4.0.



                                                                 The reference r

zhen was



                                                                 not used to int

erpret



                                                                 this result as



                                                                 normal/abnormal

.



[facility] [id number] [facility]:03:[facility]





             Test Item    Value        Reference Range Interpretation Comments

 

             Basophils (test code = 0.3          See_Comment                [Aut

omated message] The



             Basophils)                                          system which ge

nerated



                                                                 this result tra

nsmitted



                                                                 reference range

: <=1.0.



                                                                 The reference r

zhen was



                                                                 not used to int

erpret



                                                                 this result as



                                                                 normal/abnormal

.



[place] 732852-03-26 21:03:00





             Test Item    Value        Reference Range Interpretation Comments

 

             Neutrophils # (test code = Neutrophils 5.5          1.5-8.1        

           



             #)                                                  



[facility] CenterMwszzgrSYZTYFCDNR0912-45-34 21:03:00





             Test Item    Value        Reference Range Interpretation Comments

 

             Lymphocytes # (test code = Lymphocytes 1.6          1.0-5.5        

           



             #)                                                  



[place] 732852-03-26 21:03:00





             Test Item    Value        Reference Range Interpretation Comments

 

             Monocytes # (test code 0.7          See_Comment                [Aut

omated message] The



             = Monocytes #)                                        system which 

generated



                                                                 this result tra

nsmitted



                                                                 reference range

: <=0.8.



                                                                 The reference r

zhen was



                                                                 not used to int

erpret



                                                                 this result as



                                                                 normal/abnormal

.



[facility] CenterNpxkxxyWLDEVLDRQO7717-34-37 21:03:00





             Test Item    Value        Reference Range Interpretation Comments

 

             Eosinophils # (test code 0.2          See_Comment                [A

utomated message] The



             = Eosinophils #)                                        system whic

h generated



                                                                 this result tra

nsmitted



                                                                 reference range

: <=0.5.



                                                                 The reference r

zhen was



                                                                 not used to int

erpret



                                                                 this result as



                                                                 normal/abnormal

.



[facility] CenterEqvexsxGASOQKOZOJ7757-42-50 21:03:00





             Test Item    Value        Reference Range Interpretation Comments

 

             WBC (test code = WBC) 8.0          3.7-10.4                  



[facility] CenterMuigqzsAKMTJLYJJK0758-16-63 21:03:00





             Test Item    Value        Reference Range Interpretation Comments

 

             RBC (test code = RBC) 5.37         4.70-6.10                 



[facility] CenterXnqbtgcVWGBSPZIUQ2854-22-57 21:03:00





             Test Item    Value        Reference Range Interpretation Comments

 

             Hgb (test code = Hgb) 15.9         14.0-18.0                 



[facility] CenterPtqzfqdKSZWVIIHJS8558-72-64 21:03:00





             Test Item    Value        Reference Range Interpretation Comments

 

             Hct (test code = Hct) 47.3         42.0-54.0                 



[facility] CenterNpxrxydHKAIFLGKTB9247-55-36 21:03:00





             Test Item    Value        Reference Range Interpretation Comments

 

             MCV (test code = MCV) 88.1         80.0-94.0                 



[facility] CenterZwbxkebVITKFTDLVG2735-57-68 21:03:00





             Test Item    Value        Reference Range Interpretation Comments

 

             MCH (test code = MCH) 29.6 pg      27.0-31.0                 



[facility] CenterVhdaklxFYBJRCKNIR6944-82-83 21:03:00





             Test Item    Value        Reference Range Interpretation Comments

 

             MCHC (test code = MCHC) 33.6         32.0-36.0                 



[facility] CenterTyrowyjCDHDPWZWGE9724-85-57 21:03:00





             Test Item    Value        Reference Range Interpretation Comments

 

             RDW (test code = RDW) 15.3         11.5-14.5                 



[facility] CenterYszaqbaWLLBPZPUXD8986-30-32 21:03:00





             Test Item    Value        Reference Range Interpretation Comments

 

             Platelet (test code = Platelet) 370          133-450               

    



[facility] CenterSoftqjjHLTKECHZEL4030-89-60 21:03:00





             Test Item    Value        Reference Range Interpretation Comments

 

             MPV (test code = MPV) 8.1          7.4-10.4                  



[facility]2022-03-26 21:03:00





             Test Item    Value        Reference Range Interpretation Comments

 

             UA Comment 1 (test Suboptimal specimen                           



             code = UA Comment 1) received. Results may be                      

     



                          inaccurate due to the                           



                          age of the specimen.                           



                          Interpret results with                           



                          caution.                               



[facility]2022-03-26 21:03:00





             Test Item    Value        Reference Range Interpretation Comments

 

             UA Color (test code = Yellow *NA*(3/26/22                          

 



             UA Color)    4:03 PM)                               



[facility]2022-03-26 21:03:00





             Test Item    Value        Reference Range Interpretation Comments

 

             UA Turbidity (test code Slight *ABN*(3/26/22                       

    



             = UA Turbidity) 4:03 PM)                               



[facility] AND [id number] 21:03:00





             Test Item    Value        Reference Range Interpretation Comments

 

             UA Spec Grav (test code = UA Spec 1.015 1                          

      



             Grav)                                               



[facility] AND [id number] 21:03:00





             Test Item    Value        Reference Range Interpretation Comments

 

             UA pH (test code = UA pH) 5.0 1        5.0-8.0                   



[facility] AND [id number] 21:03:00





             Test Item    Value        Reference Range Interpretation Comments

 

             UA Protein (test code = UA Negative mg/dL                          

 



             Protein)                                            



[facility] AND [id number] 21:03:00





             Test Item    Value        Reference Range Interpretation Comments

 

             UA Glucose (test code = UA Negative mg/dL                          

 



             Glucose)                                            



[facility] AND [id number] 21:03:00





             Test Item    Value        Reference Range Interpretation Comments

 

             UA Ketones (test code = UA Negative mg/dL                          

 



             Ketones)                                            



[facility] AND [id number] 21:03:00





             Test Item    Value        Reference Range Interpretation Comments

 

             UA Bili (test code = Negative *NA*(3/26/22                         

  



             UA Bili)     4:03 PM)                               



[facility] AND [id number] 21:03:00





             Test Item    Value        Reference Range Interpretation Comments

 

             UA Blood (test code = Negative (3/26/22 4:03                       

    



             UA Blood)    PM)                                    



[facility] AND [id number] 21:03:00





             Test Item    Value        Reference Range Interpretation Comments

 

             UA Urobilinogen (test code = UA no gt        0.1-1.0               

    



             Urobilinogen)                                        



[facility] AND [id number] 21:03:00





             Test Item    Value        Reference Range Interpretation Comments

 

             UA Nitrite (test code Negative (3/26/22 4:03                       

    



             = UA Nitrite) PM)                                    



[facility] AND [id number] 21:03:00





             Test Item    Value        Reference Range Interpretation Comments

 

             UA Leuk Est (test code Large *ABN*(3/26/22                         

  



             = UA Leuk Est) 4:03 PM)                               



[facility] AND [id number] 21:03:00





             Test Item    Value        Reference Range Interpretation Comments

 

             UA WBC (test code = 64           See_Comment                [Automa

huma message] The



             UA WBC)                                             system which ge

nerated this



                                                                 result transmit

huma



                                                                 reference range

: <=5. The



                                                                 reference range

 was not



                                                                 used to interpr

et this



                                                                 result as zayda

l/abnormal.



[facility] AND [id number] 21:03:00





             Test Item    Value        Reference Range Interpretation Comments

 

             UA RBC (test code = 2            See_Comment                [Automa

huma message] The



             UA RBC)                                             system which ge

nerated this



                                                                 result transmit

huma



                                                                 reference range

: <=2. The



                                                                 reference range

 was not



                                                                 used to interpr

et this



                                                                 result as zayda

l/abnormal.



[facility] AND [id number] 21:03:00





             Test Item    Value        Reference Range Interpretation Comments

 

             UA Mucus (test code = UA Mucus) Few /LPF                           

    



[facility] AND [id number] 21:03:00





             Test Item    Value        Reference Range Interpretation Comments

 

             UA Sq Epi (test code = UA Sq Epi) None Seen                        

      



[facility]2022-03-26 21:03:00





             Test Item    Value        Reference Range Interpretation Comments

 

             Glucose Lvl (test code = Glucose Lvl) 126          70-99           

          



[facility]2022-03-26 21:03:00





             Test Item    Value        Reference Range Interpretation Comments

 

             BUN (test code = BUN) 16           7-22                      



[place] [address]252-03-26 21:03:00





             Test Item    Value        Reference Range Interpretation Comments

 

             Creatinine Lvl (test code = Creatinine 0.76         0.50-1.40      

           



             Lvl)                                                



[place] 924252-03-26 21:03:00





             Test Item    Value        Reference Range Interpretation Comments

 

             Sodium Lvl (test code = Sodium Lvl) 140          135-145           

        



[place] 924252-03-26 21:03:00





             Test Item    Value        Reference Range Interpretation Comments

 

             Potassium Lvl (test code = Potassium 3.3          3.5-5.1          

         



             Lvl)                                                



[facility]2022-03-26 21:03:00





             Test Item    Value        Reference Range Interpretation Comments

 

             Chloride Lvl (test code = Chloride Lvl) 111                  

            



[facility]2022-03-26 21:03:00





             Test Item    Value        Reference Range Interpretation Comments

 

             CO2 (test code = CO2) 22           24-32                     



[facility]2022-03-26 21:03:00





             Test Item    Value        Reference Range Interpretation Comments

 

             Calcium Lvl (test code = Calcium Lvl) 8.5          8.5-10.5        

          



[place] 924252-03-26 21:03:00





             Test Item    Value        Reference Range Interpretation Comments

 

             AGAP (test code = AGAP) 10.3         10.0-20.0                 



[place] 924252-03-26 21:03:00





             Test Item    Value        Reference Range Interpretation Comments

 

             eGFR (test code = eGFR) 117                                    



[place] 924252-03-26 21:03:00





             Test Item    Value        Reference Range Interpretation Comments

 

             Lactic Acid Lvl (test code = Lactic 1.1          0.5-2.2           

        



             Acid Lvl)                                           



[place] [address]-03-26 21:03:00





             Test Item    Value        Reference Range Interpretation Comments

 

             Segs (test code = Segs) 68.9         45.0-75.0                 



[place] [address]-03-26 21:03:00





             Test Item    Value        Reference Range Interpretation Comments

 

             Lymphocytes (test code = Lymphocytes) 20.4         20.0-40.0       

          



[place] 732852-03-26 21:03:00





             Test Item    Value        Reference Range Interpretation Comments

 

             Monocytes (test code = Monocytes) 8.2          2.0-12.0            

      



[place] [address]-03-26 21:03:00





             Test Item    Value        Reference Range Interpretation Comments

 

             Eosinophils (test code = 2.2          See_Comment                [A

utomated message] The



             Eosinophils)                                        system which ge

nerated



                                                                 this result tra

nsmitted



                                                                 reference range

: <=4.0.



                                                                 The reference r

zhen was



                                                                 not used to int

erpret



                                                                 this result as



                                                                 normal/abnormal

.



[place] 732852-03-26 21:03:00





             Test Item    Value        Reference Range Interpretation Comments

 

             Basophils (test code = 0.3          See_Comment                [Aut

omated message] The



             Basophils)                                          system which ge

nerated



                                                                 this result tra

nsmitted



                                                                 reference range

: <=1.0.



                                                                 The reference r

zhen was



                                                                 not used to int

erpret



                                                                 this result as



                                                                 normal/abnormal

.



[place] 732852-03-26 21:03:00





             Test Item    Value        Reference Range Interpretation Comments

 

             Neutrophils # (test code = Neutrophils 5.5          1.5-8.1        

           



             #)                                                  



[place] 732852-03-26 21:03:00





             Test Item    Value        Reference Range Interpretation Comments

 

             Lymphocytes # (test code = Lymphocytes 1.6          1.0-5.5        

           



             #)                                                  



[address]03-26 21:03:00





             Test Item    Value        Reference Range Interpretation Comments

 

             Monocytes # (test code 0.7          See_Comment                [Aut

omated message] The



             = Monocytes #)                                        system which 

generated



                                                                 this result tra

nsmitted



                                                                 reference range

: <=0.8.



                                                                 The reference r

zhen was



                                                                 not used to int

erpret



                                                                 this result as



                                                                 normal/abnormal

.



[id number]





             Test Item    Value        Reference Range Interpretation Comments

 

             Eosinophils # (test code 0.2          See_Comment                [A

utomated message] The



             = Eosinophils #)                                        system whic

h generated



                                                                 this result tra

nsmitted



                                                                 reference range

: <=0.5.



                                                                 The reference r

zhen was



                                                                 not used to int

erpret



                                                                 this result as



                                                                 normal/abnormal

.



[id number]





             Test Item    Value        Reference Range Interpretation Comments

 

             WBC (test code = WBC) 8.0          3.7-10.4                  



[place] 732852-03-26 21:03:00





             Test Item    Value        Reference Range Interpretation Comments

 

             RBC (test code = RBC) 5.37         4.70-6.10                 



[place] 732852-03-26 21:03:00





             Test Item    Value        Reference Range Interpretation Comments

 

             Hgb (test code = Hgb) 15.9         14.0-18.0                 



[place] 732852-03-26 21:03:00





             Test Item    Value        Reference Range Interpretation Comments

 

             Hct (test code = Hct) 47.3         42.0-54.0                 



[place] 732852-03-26 21:03:00





             Test Item    Value        Reference Range Interpretation Comments

 

             MCV (test code = MCV) 88.1         80.0-94.0                 



[id number] [facility]03:00





             Test Item    Value        Reference Range Interpretation Comments

 

             MCH (test code = MCH) 29.6 pg      27.0-31.0                 



[place] 732852-03-26 21:03:00





             Test Item    Value        Reference Range Interpretation Comments

 

             MCHC (test code = MCHC) 33.6         32.0-36.0                 



[place] 732852-03-26 21:03:00





             Test Item    Value        Reference Range Interpretation Comments

 

             RDW (test code = RDW) 15.3         11.5-14.5                 



[facility] C[id number] [facility]:03:00





             Test Item    Value        Reference Range Interpretation Comments

 

             Platelet (test code = Platelet) 370          133-450               

    



[id number]





             Test Item    Value        Reference Range Interpretation Comments

 

             MPV (test code = MPV) 8.1          7.4-10.4                  



[facility] AND [id number] 21:03:00





             Test Item    Value        Reference Range Interpretation Comments

 

             UA Comment 1 (test Suboptimal specimen                           



             code = UA Comment 1) received. Results may be                      

     



                          inaccurate due to the                           



                          age of the specimen.                           



                          Interpret results with                           



                          caution.                               



[facility] AND [id number] 21:03:00





             Test Item    Value        Reference Range Interpretation Comments

 

             UA Color (test code = Yellow *NA*(3/26/22                          

 



             UA Color)    4:03 PM)                               



[facility] AND [id number] 21:03:00





             Test Item    Value        Reference Range Interpretation Comments

 

             UA Turbidity (test code Slight *ABN*(3/26/22                       

    



             = UA Turbidity) 4:03 PM)                               



[facility] AND [id number] 21:03:00





             Test Item    Value        Reference Range Interpretation Comments

 

             UA Spec Grav (test code = UA Spec 1.015 1                          

      



             Grav)                                               



[facility] AND [id number] 21:03:00





             Test Item    Value        Reference Range Interpretation Comments

 

             UA pH (test code = UA pH) 5.0 1        5.0-8.0                   



[facility] AND [id number] 21:03:00





             Test Item    Value        Reference Range Interpretation Comments

 

             UA Protein (test code = UA Negative mg/dL                          

 



             Protein)                                            



[facility] AND [id number] 21:03:00





             Test Item    Value        Reference Range Interpretation Comments

 

             UA Glucose (test code = UA Negative mg/dL                          

 



             Glucose)                                            



[facility] AND [id number] 21:03:00





             Test Item    Value        Reference Range Interpretation Comments

 

             UA Ketones (test code = UA Negative mg/dL                          

 



             Ketones)                                            



[facility] AND [id number] 21:03:00





             Test Item    Value        Reference Range Interpretation Comments

 

             UA Bili (test code = Negative *NA*(3/26/22                         

  



             UA Bili)     4:03 PM)                               



[facility] AND [id number] 21:03:00





             Test Item    Value        Reference Range Interpretation Comments

 

             UA Blood (test code = Negative (3/26/22 4:03                       

    



             UA Blood)    PM)                                    



[facility] AND [id number] 21:03:00





             Test Item    Value        Reference Range Interpretation Comments

 

             UA Urobilinogen (test code = UA no gt        0.1-1.0               

    



             Urobilinogen)                                        



[facility] AND [id number] 21:03:00





             Test Item    Value        Reference Range Interpretation Comments

 

             UA Nitrite (test code Negative (3/26/22 4:03                       

    



             = UA Nitrite) PM)                                    



[facility] AND [id number] 21:03:00





             Test Item    Value        Reference Range Interpretation Comments

 

             UA Leuk Est (test code Large *ABN*(3/26/22                         

  



             = UA Leuk Est) 4:03 PM)                               



[facility] AND [id number] 21:03:00





             Test Item    Value        Reference Range Interpretation Comments

 

             UA WBC (test code = 64           See_Comment                [Automa

huma message] The



             UA WBC)                                             system which ge

nerated this



                                                                 result transmit

huma



                                                                 reference range

: <=5. The



                                                                 reference range

 was not



                                                                 used to interpr

et this



                                                                 result as zayda

l/abnormal.



[facility] AND [id number] 21:03:00





             Test Item    Value        Reference Range Interpretation Comments

 

             UA RBC (test code = 2            See_Comment                [Automa

huma message] The



             UA RBC)                                             system which ge

nerated this



                                                                 result transmit

huma



                                                                 reference range

: <=2. The



                                                                 reference range

 was not



                                                                 used to interpr

et this



                                                                 result as zayda

l/abnormal.



[facility] AND [id number] 21:03:00





             Test Item    Value        Reference Range Interpretation Comments

 

             UA Mucus (test code = UA Mucus) Few /LPF                           

    



[facility] AND [id number] 21:03:00





             Test Item    Value        Reference Range Interpretation Comments

 

             UA Sq Epi (test code = UA Sq Epi) None Seen                        

      



[facility]2022-03-26 21:03:00





             Test Item    Value        Reference Range Interpretation Comments

 

             Glucose Lvl (test code = Glucose Lvl) 126          70-99           

          



[facility] [id number] 21:03:00





             Test Item    Value        Reference Range Interpretation Comments

 

             BUN (test code = BUN) 16           7-22                      



[facility]2022-03-26 21:03:00





             Test Item    Value        Reference Range Interpretation Comments

 

             Creatinine Lvl (test code = Creatinine 0.76         0.50-1.40      

           



             Lvl)                                                



[place] 924252-03-26 21:03:00





             Test Item    Value        Reference Range Interpretation Comments

 

             Sodium Lvl (test code = Sodium Lvl) 140          135-145           

        



[place] 924252-03-26 21:03:00





             Test Item    Value        Reference Range Interpretation Comments

 

             Potassium Lvl (test code = Potassium 3.3          3.5-5.1          

         



             Lvl)                                                



[place] 924252-03-26 21:03:00





             Test Item    Value        Reference Range Interpretation Comments

 

             Chloride Lvl (test code = Chloride Lvl) 111                  

            



[place] 924252-03-26 21:03:00





             Test Item    Value        Reference Range Interpretation Comments

 

             CO2 (test code = CO2) 22           24-32                     



[place] [address]252-03-26 21:03:00





             Test Item    Value        Reference Range Interpretation Comments

 

             Calcium Lvl (test code = Calcium Lvl) 8.5          8.5-10.5        

          



[place] 924252-03-26 21:03:00





             Test Item    Value        Reference Range Interpretation Comments

 

             AGAP (test code = AGAP) 10.3         10.0-20.0                 



[place] 924252-03-26 21:03:00





             Test Item    Value        Reference Range Interpretation Comments

 

             eGFR (test code = eGFR) 117                                    



[place] 924252-03-26 21:03:00





             Test Item    Value        Reference Range Interpretation Comments

 

             Lactic Acid Lvl (test code = Lactic 1.1          0.5-2.2           

        



             Acid Lvl)                                           



[place] 732852-03-26 21:03:00





             Test Item    Value        Reference Range Interpretation Comments

 

             Segs (test code = Segs) 68.9         45.0-75.0                 



[place] 732852-03-26 21:03:00





             Test Item    Value        Reference Range Interpretation Comments

 

             Lymphocytes (test code = Lymphocytes) 20.4         20.0-40.0       

          



[place] [address]-03-26 21:03:00





             Test Item    Value        Reference Range Interpretation Comments

 

             Monocytes (test code = Monocytes) 8.2          2.0-12.0            

      



[place] [address]-03-26 21:03:00





             Test Item    Value        Reference Range Interpretation Comments

 

             Eosinophils (test code = 2.2          See_Comment                [A

utomated message] The



             Eosinophils)                                        system which ge

nerated



                                                                 this result tra

nsmitted



                                                                 reference range

: <=4.0.



                                                                 The reference r

zhen was



                                                                 not used to int

erpret



                                                                 this result as



                                                                 normal/abnormal

.



[id number]





             Test Item    Value        Reference Range Interpretation Comments

 

             Basophils (test code = 0.3          See_Comment                [Aut

omated message] The



             Basophils)                                          system which ge

nerated



                                                                 this result tra

nsmitted



                                                                 reference range

: <=1.0.



                                                                 The reference r

zhen was



                                                                 not used to int

erpret



                                                                 this result as



                                                                 normal/abnormal

.



[place] 732852-03-26 21:03:00





             Test Item    Value        Reference Range Interpretation Comments

 

             Neutrophils # (test code = Neutrophils 5.5          1.5-8.1        

           



             #)                                                  



[facility] [id number] 21:03:00





             Test Item    Value        Reference Range Interpretation Comments

 

             Lymphocytes # (test code = Lymphocytes 1.6          1.0-5.5        

           



             #)                                                  



[place] 732852-03-26 21:03:00





             Test Item    Value        Reference Range Interpretation Comments

 

             Monocytes # (test code 0.7          See_Comment                [Aut

omated message] The



             = Monocytes #)                                        system which 

generated



                                                                 this result tra

nsmitted



                                                                 reference range

: <=0.8.



                                                                 The reference r

zhen was



                                                                 not used to int

erpret



                                                                 this result as



                                                                 normal/abnormal

.



[facility] [id number]:03:00





             Test Item    Value        Reference Range Interpretation Comments

 

             Eosinophils # (test code 0.2          See_Comment                [A

utomated message] The



             = Eosinophils #)                                        system whic

h generated



                                                                 this result tra

nsmitted



                                                                 reference range

: <=0.5.



                                                                 The reference r

zhen was



                                                                 not used to int

erpret



                                                                 this result as



                                                                 normal/abnormal

.



[id number]





             Test Item    Value        Reference Range Interpretation Comments

 

             WBC (test code = WBC) 8.0          3.7-10.4                  



[place] 732852-03-26 21:03:00





             Test Item    Value        Reference Range Interpretation Comments

 

             RBC (test code = RBC) 5.37         4.70-6.10                 



[place] 732852-03-26 21:03:00





             Test Item    Value        Reference Range Interpretation Comments

 

             Hgb (test code = Hgb) 15.9         14.0-18.0                 



[place] 732852-03-26 21:03:00





             Test Item    Value        Reference Range Interpretation Comments

 

             Hct (test code = Hct) 47.3         42.0-54.0                 



[facility]





             Test Item    Value        Reference Range Interpretation Comments

 

             MCV (test code = MCV) 88.1         80.0-94.0                 



[id number] 21:03:00





             Test Item    Value        Reference Range Interpretation Comments

 

             MCH (test code = MCH) 29.6 pg      27.0-31.0                 



[id number] 21:03:00





             Test Item    Value        Reference Range Interpretation Comments

 

             MCHC (test code = MCHC) 33.6         32.0-36.0                 



[id number] 21:03:00





             Test Item    Value        Reference Range Interpretation Comments

 

             RDW (test code = RDW) 15.3         11.5-14.5                 



[facility] Center[facility]tefeqnMWLOHGUKCY7139-24-[facility] 21:03:00





             Test Item    Value        Reference Range Interpretation Comments

 

             Platelet (test code = Platelet) 370          133-450               

    



[facility] [id number] 21:03:00





             Test Item    Value        Reference Range Interpretation Comments

 

             MPV (test code = MPV) 8.1          7.4-10.4                  



[facility] AND [id number] 21:03:00





             Test Item    Value        Reference Range Interpretation Comments

 

             UA Comment 1 (test Suboptimal specimen                           



             code = UA Comment 1) received. Results may be                      

     



                          inaccurate due to the                           



                          age of the specimen.                           



                          Interpret results with                           



                          caution.                               



[facility] [id number] 21:03:00





             Test Item    Value        Reference Range Interpretation Comments

 

             UA Color (test code = Yellow *NA*(3/26/22                          

 



             UA Color)    4:03 PM)                               



[facility] [id number] 21:03:00





             Test Item    Value        Reference Range Interpretation Comments

 

             UA Turbidity (test code Slight *ABN*(3/26/22                       

    



             = UA Turbidity) 4:03 PM)                               



[facility] [id number] 21:03:00





             Test Item    Value        Reference Range Interpretation Comments

 

             UA Spec Grav (test code = UA Spec 1.015 1                          

      



             Grav)                                               



[facility] [id number] 21:03:00





             Test Item    Value        Reference Range Interpretation Comments

 

             UA pH (test code = UA pH) 5.0 1        5.0-8.0                   



[facility] [id number] 21:03:00





             Test Item    Value        Reference Range Interpretation Comments

 

             UA Protein (test code = UA Negative mg/dL                          

 



             Protein)                                            



[facility] AND [id number] 21:03:00





             Test Item    Value        Reference Range Interpretation Comments

 

             UA Glucose (test code = UA Negative mg/dL                          

 



             Glucose)                                            



[facility] AND [id number] 21:03:00





             Test Item    Value        Reference Range Interpretation Comments

 

             UA Ketones (test code = UA Negative mg/dL                          

 



             Ketones)                                            



[facility] AND [id number] 21:03:00





             Test Item    Value        Reference Range Interpretation Comments

 

             UA Bili (test code = Negative *NA*(3/26/22                         

  



             UA Bili)     4:03 PM)                               



[facility] AND [id number] 21:03:00





             Test Item    Value        Reference Range Interpretation Comments

 

             UA Blood (test code = Negative (3/26/22 4:03                       

    



             UA Blood)    PM)                                    



[facility] AND [id number] 21:03:00





             Test Item    Value        Reference Range Interpretation Comments

 

             UA Urobilinogen (test code = UA no gt        0.1-1.0               

    



             Urobilinogen)                                        



[facility] AND [id number] 21:03:00





             Test Item    Value        Reference Range Interpretation Comments

 

             UA Nitrite (test code Negative (3/26/22 4:03                       

    



             = UA Nitrite) PM)                                    



[facility] AND [id number] 21:03:00





             Test Item    Value        Reference Range Interpretation Comments

 

             UA Leuk Est (test code Large *ABN*(3/26/22                         

  



             = UA Leuk Est) 4:03 PM)                               



[facility] AND [id number] 21:03:00





             Test Item    Value        Reference Range Interpretation Comments

 

             UA WBC (test code = 64           See_Comment                [Automa

huma message] The



             UA WBC)                                             system which ge

nerated this



                                                                 result transmit

huma



                                                                 reference range

: <=5. The



                                                                 reference range

 was not



                                                                 used to interpr

et this



                                                                 result as zayda

l/abnormal.



[facility] AND [id number] 21:03:00





             Test Item    Value        Reference Range Interpretation Comments

 

             UA RBC (test code = 2            See_Comment                [Automa

huma message] The



             UA RBC)                                             system which ge

nerated this



                                                                 result transmit

huma



                                                                 reference range

: <=2. The



                                                                 reference range

 was not



                                                                 used to interpr

et this



                                                                 result as zayda

l/abnormal.



[facility] AND [id number] 21:03:00





             Test Item    Value        Reference Range Interpretation Comments

 

             UA Mucus (test code = UA Mucus) Few /LPF                           

    



[facility] AND [id number] 21:03:00





             Test Item    Value        Reference Range Interpretation Comments

 

             UA Sq Epi (test code = UA Sq Epi) None Seen                        

      



[place] 924252-03-26 09:58:00





             Test Item    Value        Reference Range Interpretation Comments

 

             Lactic Acid Lvl (test code = Lactic 2.4          0.5-2.2           

        



             Acid Lvl)                                           



[place] 732852-03-26 09:58:00





             Test Item    Value        Reference Range Interpretation Comments

 

             Sed Rate (test code = 13           See_Comment                [Auto

mated message] The



             Sed Rate)                                           system which ge

nerated this



                                                                 result transmit

huma



                                                                 reference range

: <=15. The



                                                                 reference range

 was not



                                                                 used to interpr

et this



                                                                 result as zayda

l/abnormal.



[place] [address]-03-26 09:58:00





             Test Item    Value        Reference Range Interpretation Comments

 

             C-REACTIVE PROTEIN (test code = 10.6                               

    



             C-REACTIVE PROTEIN)                                        



[place] 924252-03-26 09:58:00





             Test Item    Value        Reference Range Interpretation Comments

 

             Lactic Acid Lvl (test code = Lactic 2.4          0.5-2.2           

        



             Acid Lvl)                                           



[place] [address]-03-26 09:58:00





             Test Item    Value        Reference Range Interpretation Comments

 

             Sed Rate (test code = 13           See_Comment                [Auto

mated message] The



             Sed Rate)                                           system which ge

nerated this



                                                                 result transmit

huma



                                                                 reference range

: <=15. The



                                                                 reference range

 was not



                                                                 used to interpr

et this



                                                                 result as zayda

l/abnormal.



[place] [address]-03-26 09:58:00





             Test Item    Value        Reference Range Interpretation Comments

 

             C-REACTIVE PROTEIN (test code = 10.6                               

    



             C-REACTIVE PROTEIN)                                        



[place] [address]-03-26 09:58:00





             Test Item    Value        Reference Range Interpretation Comments

 

             Lactic Acid Lvl (test code = Lactic 2.4          0.5-2.2           

        



             Acid Lvl)                                           



[place] [address]-03-26 09:58:00





             Test Item    Value        Reference Range Interpretation Comments

 

             Sed Rate (test code = 13           See_Comment                [Auto

mated message] The



             Sed Rate)                                           system which ge

nerated this



                                                                 result transmit

huma



                                                                 reference range

: <=15. The



                                                                 reference range

 was not



                                                                 used to interpr

et this



                                                                 result as zayda

l/abnormal.



[address]03-26 09:58:00





             Test Item    Value        Reference Range Interpretation Comments

 

             C-REACTIVE PROTEIN (test code = 10.6                               

    



             C-REACTIVE PROTEIN)                                        



[address]03-26 09:58:00





             Test Item    Value        Reference Range Interpretation Comments

 

             Lactic Acid Lvl (test code = Lactic 2.4          0.5-2.2           

        



             Acid Lvl)                                           



[address]03-26 09:58:00





             Test Item    Value        Reference Range Interpretation Comments

 

             Sed Rate (test code = 13           See_Comment                [Auto

mated message] The



             Sed Rate)                                           system which ge

nerated this



                                                                 result transmit

huma



                                                                 reference range

: <=15. The



                                                                 reference range

 was not



                                                                 used to interpr

et this



                                                                 result as zayda

l/abnormal.



[address]03-26 09:58:00





             Test Item    Value        Reference Range Interpretation Comments

 

             C-REACTIVE PROTEIN (test code = 10.6                               

    



             C-REACTIVE PROTEIN)                                        



[address]03-26 09:58:00





             Test Item    Value        Reference Range Interpretation Comments

 

             Lactic Acid Lvl (test code = Lactic 2.4          0.5-2.2           

        



             Acid Lvl)                                           



[address]03-26 09:58:00





             Test Item    Value        Reference Range Interpretation Comments

 

             Sed Rate (test code = 13           See_Comment                [Auto

mated message] The



             Sed Rate)                                           system which ge

nerated this



                                                                 result transmit

huma



                                                                 reference range

: <=15. The



                                                                 reference range

 was not



                                                                 used to interpr

et this



                                                                 result as zayda

l/abnormal.



[address]03-26 09:58:00





             Test Item    Value        Reference Range Interpretation Comments

 

             C-REACTIVE PROTEIN (test code = 10.6                               

    



             C-REACTIVE PROTEIN)                                        



[address]03-26 09:58:00





             Test Item    Value        Reference Range Interpretation Comments

 

             Lactic Acid Lvl (test code = Lactic 2.4          0.5-2.2           

        



             Acid Lvl)                                           



[address]03-26 09:58:00





             Test Item    Value        Reference Range Interpretation Comments

 

             Sed Rate (test code = 13           See_Comment                [Auto

mated message] The



             Sed Rate)                                           system which ge

nerated this



                                                                 result transmit

huma



                                                                 reference range

: <=15. The



                                                                 reference range

 was not



                                                                 used to interpr

et this



                                                                 result as zayda

l/abnormal.



[address]03-26 09:58:00





             Test Item    Value        Reference Range Interpretation Comments

 

             C-REACTIVE PROTEIN (test code = 10.6                               

    



             C-REACTIVE PROTEIN)                                        



[facility] [id number] 09:58:00





             Test Item    Value        Reference Range Interpretation Comments

 

             Lactic Acid Lvl (test code = Lactic 2.4          0.5-2.2           

        



             Acid Lvl)                                           



[address]03-26 09:58:00





             Test Item    Value        Reference Range Interpretation Comments

 

             Sed Rate (test code = 13           See_Comment                [Auto

mated message] The



             Sed Rate)                                           system which ge

nerated this



                                                                 result transmit

huma



                                                                 reference range

: <=15. The



                                                                 reference range

 was not



                                                                 used to interpr

et this



                                                                 result as zayda

l/abnormal.



[address]03-26 09:58:00





             Test Item    Value        Reference Range Interpretation Comments

 

             C-REACTIVE PROTEIN (test code = 10.6                               

    



             C-REACTIVE PROTEIN)                                        



[place] 924252-03-26 09:58:00





             Test Item    Value        Reference Range Interpretation Comments

 

             Lactic Acid Lvl (test code = Lactic 2.4          0.5-2.2           

        



             Acid Lvl)                                           



[address]03-26 09:58:00





             Test Item    Value        Reference Range Interpretation Comments

 

             Sed Rate (test code = 13           See_Comment                [Auto

mated message] The



             Sed Rate)                                           system which ge

nerated this



                                                                 result transmit

huma



                                                                 reference range

: <=15. The



                                                                 reference range

 was not



                                                                 used to interpr

et this



                                                                 result as zayda

l/abnormal.



[place] 300472-03-26 09:58:00





             Test Item    Value        Reference Range Interpretation Comments

 

             C-REACTIVE PROTEIN (test code = 10.6                               

    



             C-REACTIVE PROTEIN)                                        



[place] 272462-03-25 18:36:00





             Test Item    Value        Reference Range Interpretation Comments

 

             Protein CSF (test code = Protein CSF) 14           15-45           

          



[facility] [id number] 18:36:00





             Test Item    Value        Reference Range Interpretation Comments

 

             Glucose CSF (test code = Glucose CSF) 67           45-80           

          



[place] 272462-03-25 18:36:00





             Test Item    Value        Reference Range Interpretation Comments

 

             Tube Num CSF (test xxxxxxx (3/25/22 1:36                           



             code = Tube Num CSF) PM)                                    



[place] 272462-03-25 18:36:00





             Test Item    Value        Reference Range Interpretation Comments

 

             Color CSF (test code Colorless (3/25/22 1:36                       

    



             = Color CSF) PM)                                    



[facility]2022-03-25 18:36:00





             Test Item    Value        Reference Range Interpretation Comments

 

             Clarity CSF (test code = Clear (3/25/22 1:36                       

    



             Clarity CSF) PM)                                    



[facility]2022-03-25 18:36:00





             Test Item    Value        Reference Range Interpretation Comments

 

             Supernat CSF (test Colorless (3/25/22 1:36                         

  



             code = Supernat CSF) PM)                                    



[facility]2022-03-25 18:36:00





             Test Item    Value        Reference Range Interpretation Comments

 

             Nucleated Cells CSF 0            See_Comment                [Automa

huma message] The



             (test code = Nucleated                                        syste

m which generated



             Cells CSF)                                          this result tra

nsmitted



                                                                 reference range

: <=53.



                                                                 The reference r

zhen was



                                                                 not used to int

erpret



                                                                 this result as



                                                                 normal/abnormal

.



[facility]2022-03-25 18:36:00





             Test Item    Value        Reference Range Interpretation Comments

 

             RBC CSF (test code = 0            See_Comment                [Autom

ated message] The



             RBC CSF)                                            system which ge

nerated this



                                                                 result transmit

huma



                                                                 reference range

: <=03. The



                                                                 reference range

 was not



                                                                 used to interpr

et this



                                                                 result as zayda

l/abnormal.



[facility]2022-03-25 18:36:00





             Test Item    Value        Reference Range Interpretation Comments

 

             Comment CSF (test Differential not                           



             code = Comment CSF) performed on WBC count of                      

     



                          less than 5.                           



[id number]:36:00





             Test Item    Value        Reference Range Interpretation Comments

 

             Gram Stain Report Gram Stain Performed By:                         

  



             (test code = Gram [facility]                           



             Stain Report) [facility] w/Gram [id number] 18:36:00





             Test Item    Value        Reference Range Interpretation Comments

 

             Culture: CSF w/Gram Stain (test No Growth                          

    



             code = Culture: CSF w/Gram Stain)                                  

      



[facility]2022-03-25 18:36:00





             Test Item    Value        Reference Range Interpretation Comments

 

             Protein CSF (test code = Protein CSF) 14           15-45           

          



[facility]2022-03-25 18:36:00





             Test Item    Value        Reference Range Interpretation Comments

 

             Glucose CSF (test code = Glucose CSF) 67           45-80           

          



[facility]2022-03-25 18:36:00





             Test Item    Value        Reference Range Interpretation Comments

 

             Tube Num CSF (test xxxxxxx (3/25/22 1:36                           



             code = Tube Num CSF) PM)                                    



[facility]2022-03-25 18:36:00





             Test Item    Value        Reference Range Interpretation Comments

 

             Color CSF (test code Colorless (3/25/22 1:36                       

    



             = Color CSF) PM)                                    



[facility]2022-03-25 18:36:00





             Test Item    Value        Reference Range Interpretation Comments

 

             Clarity CSF (test code = Clear (3/25/22 1:36                       

    



             Clarity CSF) PM)                                    



[facility]2022-03-25 18:36:00





             Test Item    Value        Reference Range Interpretation Comments

 

             Supernat CSF (test Colorless (3/25/22 1:36                         

  



             code = Supernat CSF) PM)                                    



[facility]2022-03-25 18:36:00





             Test Item    Value        Reference Range Interpretation Comments

 

             Nucleated Cells CSF 0            See_Comment                [Automa

huma message] The



             (test code = Nucleated                                        syste

m which generated



             Cells CSF)                                          this result tra

nsmitted



                                                                 reference range

: <=53.



                                                                 The reference r

zhen was



                                                                 not used to int

erpret



                                                                 this result as



                                                                 normal/abnormal

.



[facility]2022-03-25 18:36:00





             Test Item    Value        Reference Range Interpretation Comments

 

             RBC CSF (test code = 0            See_Comment                [Autom

ated message] The



             RBC CSF)                                            system which ge

nerated this



                                                                 result transmit

huma



                                                                 reference range

: <=03. The



                                                                 reference range

 was not



                                                                 used to interpr

et this



                                                                 result as zayda

l/abnormal.



[facility]2022-03-25 18:36:00





             Test Item    Value        Reference Range Interpretation Comments

 

             Comment CSF (test Differential not                           



             code = Comment CSF) performed on WBC count of                      

     



                          less than 5.                           



[id number]





             Test Item    Value        Reference Range Interpretation Comments

 

             Gram Stain Report Gram Stain Performed By:                         

  



             (test code = Gram [facility]                           



             Stain Report) [facility]ram [id number] 18:36:00





             Test Item    Value        Reference Range Interpretation Comments

 

             Culture: CSF w/Gram Stain (test No Growth                          

    



             code = Culture: CSF w/Gram Stain)                                  

      



[facility]2022-03-25 18:36:00





             Test Item    Value        Reference Range Interpretation Comments

 

             Protein CSF (test code = Protein CSF) 14           15-45           

          



[facility] [id number] 18:36:00





             Test Item    Value        Reference Range Interpretation Comments

 

             Glucose CSF (test code = Glucose CSF) 67           45-80           

          



[facility]2022-03-25 18:36:00





             Test Item    Value        Reference Range Interpretation Comments

 

             Tube Num CSF (test xxxxxxx (3/25/22 1:36                           



             code = Tube Num CSF) PM)                                    



[facility]2022-03-25 18:36:00





             Test Item    Value        Reference Range Interpretation Comments

 

             Color CSF (test code Colorless (3/25/22 1:36                       

    



             = Color CSF) PM)                                    



[facility] [id number] 18:36:00





             Test Item    Value        Reference Range Interpretation Comments

 

             Clarity CSF (test code = Clear (3/25/22 1:36                       

    



             Clarity CSF) PM)                                    



[facility]2022-03-25 18:36:00





             Test Item    Value        Reference Range Interpretation Comments

 

             Supernat CSF (test Colorless (3/25/22 1:36                         

  



             code = Supernat CSF) PM)                                    



[facility]2022-03-25 18:36:00





             Test Item    Value        Reference Range Interpretation Comments

 

             Nucleated Cells CSF 0            See_Comment                [Automa

huma message] The



             (test code = Nucleated                                        syste

m which generated



             Cells CSF)                                          this result tra

nsmitted



                                                                 reference range

: <=53.



                                                                 The reference r

zhen was



                                                                 not used to int

erpret



                                                                 this result as



                                                                 normal/abnormal

.



[facility]2022-03-25 18:36:00





             Test Item    Value        Reference Range Interpretation Comments

 

             RBC CSF (test code = 0            See_Comment                [Autom

ated message] The



             RBC CSF)                                            system which ge

nerated this



                                                                 result transmit

huma



                                                                 reference range

: <=03. The



                                                                 reference range

 was not



                                                                 used to interpr

et this



                                                                 result as zayda

l/abnormal.



[facility] [id number] 18:36:00





             Test Item    Value        Reference Range Interpretation Comments

 

             Comment CSF (test Differential not                           



             code = Comment CSF) performed on WBC count of                      

     



                          less than 5.                           



[facility] [id number] 18:36:00





             Test Item    Value        Reference Range Interpretation Comments

 

             Gram Stain Report Gram Stain Performed By:                         

  



             (test code = Gram [facility]                           



             Stain Report) [facility] w/Gram [id number] 18:36:00





             Test Item    Value        Reference Range Interpretation Comments

 

             Culture: CSF w/Gram Stain (test No Growth                          

    



             code = Culture: CSF w/Gram Stain)                                  

      



[facility]2022-03-25 18:36:00





             Test Item    Value        Reference Range Interpretation Comments

 

             Protein CSF (test code = Protein CSF) 14           15-45           

          



[facility]2022-03-25 18:36:00





             Test Item    Value        Reference Range Interpretation Comments

 

             Glucose CSF (test code = Glucose CSF) 67           45-80           

          



[facility]2022-03-25 18:36:00





             Test Item    Value        Reference Range Interpretation Comments

 

             Tube Num CSF (test xxxxxxx (3/25/22 1:36                           



             code = Tube Num CSF) PM)                                    



[facility]2022-03-25 18:36:00





             Test Item    Value        Reference Range Interpretation Comments

 

             Color CSF (test code Colorless (3/25/22 1:36                       

    



             = Color CSF) PM)                                    



[facility]2022-03-25 18:36:00





             Test Item    Value        Reference Range Interpretation Comments

 

             Clarity CSF (test code = Clear (3/25/22 1:36                       

    



             Clarity CSF) PM)                                    



[facility]2022-03-25 18:36:00





             Test Item    Value        Reference Range Interpretation Comments

 

             Supernat CSF (test Colorless (3/25/22 1:36                         

  



             code = Supernat CSF) PM)                                    



[facility]2022-03-25 18:36:00





             Test Item    Value        Reference Range Interpretation Comments

 

             Nucleated Cells CSF 0            See_Comment                [Automa

huma message] The



             (test code = Nucleated                                        syste

m which generated



             Cells CSF)                                          this result tra

nsmitted



                                                                 reference range

: <=53.



                                                                 The reference r

zhen was



                                                                 not used to int

erpret



                                                                 this result as



                                                                 normal/abnormal

.



[facility]2022-03-25 18:36:00





             Test Item    Value        Reference Range Interpretation Comments

 

             RBC CSF (test code = 0            See_Comment                [Autom

ated message] The



             RBC CSF)                                            system which ge

nerated this



                                                                 result transmit

huma



                                                                 reference range

: <=03. The



                                                                 reference range

 was not



                                                                 used to interpr

et this



                                                                 result as zayda

l/abnormal.



[facility]2022-03-25 18:36:00





             Test Item    Value        Reference Range Interpretation Comments

 

             Comment CSF (test Differential not                           



             code = Comment CSF) performed on WBC count of                      

     



                          less than 5.                           



[facility] [id number] 18:36:00





             Test Item    Value        Reference Range Interpretation Comments

 

             Gram Stain Report Gram Stain Performed By:                         

  



             (test code = Gram [facility]                           



             Stain Report) [facility] w/Gram [id number] 18:36:00





             Test Item    Value        Reference Range Interpretation Comments

 

             Culture: CSF w/Gram Stain (test No Growth                          

    



             code = Culture: CSF w/Gram Stain)                                  

      



[facility] [id number] 18:36:00





             Test Item    Value        Reference Range Interpretation Comments

 

             Protein CSF (test code = Protein CSF) 14           15-45           

          



[facility]2022-03-25 18:36:00





             Test Item    Value        Reference Range Interpretation Comments

 

             Glucose CSF (test code = Glucose CSF) 67           45-80           

          



[facility]2022-03-25 18:36:00





             Test Item    Value        Reference Range Interpretation Comments

 

             Tube Num CSF (test xxxxxxx (3/25/22 1:36                           



             code = Tube Num CSF) PM)                                    



[facility]2022-03-25 18:36:00





             Test Item    Value        Reference Range Interpretation Comments

 

             Color CSF (test code Colorless (3/25/22 1:36                       

    



             = Color CSF) PM)                                    



[facility]2022-03-25 18:36:00





             Test Item    Value        Reference Range Interpretation Comments

 

             Clarity CSF (test code = Clear (3/25/22 1:36                       

    



             Clarity CSF) PM)                                    



[facility]2022-03-25 18:36:00





             Test Item    Value        Reference Range Interpretation Comments

 

             Supernat CSF (test Colorless (3/25/22 1:36                         

  



             code = Supernat CSF) PM)                                    



[facility]2022-03-25 18:36:00





             Test Item    Value        Reference Range Interpretation Comments

 

             Nucleated Cells CSF 0            See_Comment                [Automa

huma message] The



             (test code = Nucleated                                        syste

m which generated



             Cells CSF)                                          this result tra

nsmitted



                                                                 reference range

: <=53.



                                                                 The reference r

zhen was



                                                                 not used to int

erpret



                                                                 this result as



                                                                 normal/abnormal

.



[facility]2022-03-25 18:36:00





             Test Item    Value        Reference Range Interpretation Comments

 

             RBC CSF (test code = 0            See_Comment                [Autom

ated message] The



             RBC CSF)                                            system which ge

nerated this



                                                                 result transmit

huma



                                                                 reference range

: <=03. The



                                                                 reference range

 was not



                                                                 used to interpr

et this



                                                                 result as zayda

l/abnormal.



[facility]2022-03-25 18:36:00





             Test Item    Value        Reference Range Interpretation Comments

 

             Comment CSF (test Differential not                           



             code = Comment CSF) performed on WBC count of                      

     



                          less than 5.                           



[facility] [id number] 18:36:00





             Test Item    Value        Reference Range Interpretation Comments

 

             Gram Stain Report Gram Stain Performed By:                         

  



             (test code = Gram [facility]                           



             Stain Report) [facility] CSF w/Gram [id number] 18:36:00





             Test Item    Value        Reference Range Interpretation Comments

 

             Culture: CSF w/Gram Stain (test No Growth                          

    



             code = Culture: CSF w/Gram Stain)                                  

      



[facility] [id number] 18:36:00





             Test Item    Value        Reference Range Interpretation Comments

 

             Protein CSF (test code = Protein CSF) 14           15-45           

          



[facility]2022-03-25 18:36:00





             Test Item    Value        Reference Range Interpretation Comments

 

             Glucose CSF (test code = Glucose CSF) 67           45-80           

          



[facility]2022-03-25 18:36:00





             Test Item    Value        Reference Range Interpretation Comments

 

             Tube Num CSF (test xxxxxxx (3/25/22 1:36                           



             code = Tube Num CSF) PM)                                    



[facility]2022-03-25 18:36:00





             Test Item    Value        Reference Range Interpretation Comments

 

             Color CSF (test code Colorless (3/25/22 1:36                       

    



             = Color CSF) PM)                                    



[facility]2022-03-25 18:36:00





             Test Item    Value        Reference Range Interpretation Comments

 

             Clarity CSF (test code = Clear (3/25/22 1:36                       

    



             Clarity CSF) PM)                                    



[facility]2022-03-25 18:36:00





             Test Item    Value        Reference Range Interpretation Comments

 

             Supernat CSF (test Colorless (3/25/22 1:36                         

  



             code = Supernat CSF) PM)                                    



[facility]2022-03-25 18:36:00





             Test Item    Value        Reference Range Interpretation Comments

 

             Nucleated Cells CSF 0            See_Comment                [Automa

huma message] The



             (test code = Nucleated                                        syste

m which generated



             Cells CSF)                                          this result tra

nsmitted



                                                                 reference range

: <=53.



                                                                 The reference r

zhen was



                                                                 not used to int

erpret



                                                                 this result as



                                                                 normal/abnormal

.



[facility]2022-03-25 18:36:00





             Test Item    Value        Reference Range Interpretation Comments

 

             RBC CSF (test code = 0            See_Comment                [Autom

ated message] The



             RBC CSF)                                            system which ge

nerated this



                                                                 result transmit

huma



                                                                 reference range

: <=03. The



                                                                 reference range

 was not



                                                                 used to interpr

et this



                                                                 result as zayda

l/abnormal.



[facility] [id number] 18:36:00





             Test Item    Value        Reference Range Interpretation Comments

 

             Comment CSF (test Differential not                           



             code = Comment CSF) performed on WBC count of                      

     



                          less than 5.                           



[facility] [id number] 18:36:00





             Test Item    Value        Reference Range Interpretation Comments

 

             Gram Stain Report Gram Stain Performed By:                         

  



             (test code = Gram [facility]                           



             Stain Report) [facility] CSF w/Gram [id number] 18:36:00





             Test Item    Value        Reference Range Interpretation Comments

 

             Culture: CSF w/Gram Stain (test No Growth                          

    



             code = Culture: CSF w/Gram Stain)                                  

      



[facility]2022-03-25 18:36:00





             Test Item    Value        Reference Range Interpretation Comments

 

             Protein CSF (test code = Protein CSF) 14           15-45           

          



[facility]2022-03-25 18:36:00





             Test Item    Value        Reference Range Interpretation Comments

 

             Glucose CSF (test code = Glucose CSF) 67           45-80           

          



[facility]2022-03-25 18:36:00





             Test Item    Value        Reference Range Interpretation Comments

 

             Tube Num CSF (test xxxxxxx (3/25/22 1:36                           



             code = Tube Num CSF) PM)                                    



[facility]2022-03-25 18:36:00





             Test Item    Value        Reference Range Interpretation Comments

 

             Color CSF (test code Colorless (3/25/22 1:36                       

    



             = Color CSF) PM)                                    



[facility]2022-03-25 18:36:00





             Test Item    Value        Reference Range Interpretation Comments

 

             Clarity CSF (test code = Clear (3/25/22 1:36                       

    



             Clarity CSF) PM)                                    



[facility]2022-03-25 18:36:00





             Test Item    Value        Reference Range Interpretation Comments

 

             Supernat CSF (test Colorless (3/25/22 1:36                         

  



             code = Supernat CSF) PM)                                    



[facility]2022-03-25 18:36:00





             Test Item    Value        Reference Range Interpretation Comments

 

             Nucleated Cells CSF 0            See_Comment                [Automa

huma message] The



             (test code = Nucleated                                        syste

m which generated



             Cells CSF)                                          this result tra

nsmitted



                                                                 reference range

: <=53.



                                                                 The reference r

zhen was



                                                                 not used to int

erpret



                                                                 this result as



                                                                 normal/abnormal

.



[facility] [id number] 18:36:00





             Test Item    Value        Reference Range Interpretation Comments

 

             RBC CSF (test code = 0            See_Comment                [Autom

ated message] The



             RBC CSF)                                            system which ge

nerated this



                                                                 result transmit

huma



                                                                 reference range

: <=03. The



                                                                 reference range

 was not



                                                                 used to interpr

et this



                                                                 result as zayda

l/abnormal.



[facility]2022-03-25 18:36:00





             Test Item    Value        Reference Range Interpretation Comments

 

             Comment CSF (test Differential not                           



             code = Comment CSF) performed on WBC count of                      

     



                          less than 5.                           



[facility] [id number] 18:36:00





             Test Item    Value        Reference Range Interpretation Comments

 

             Gram Stain Report Gram Stain Performed By:                         

  



             (test code = Gram [facility]                           



             Stain Report) [facility]/Gram [id number] 18:36:00





             Test Item    Value        Reference Range Interpretation Comments

 

             Culture: CSF w/Gram Stain (test No Growth                          

    



             code = Culture: CSF w/Gram Stain)                                  

      



[facility]2022-03-25 18:36:00





             Test Item    Value        Reference Range Interpretation Comments

 

             Protein CSF (test code = Protein CSF) 14           15-45           

          



[facility]2022-03-25 18:36:00





             Test Item    Value        Reference Range Interpretation Comments

 

             Glucose CSF (test code = Glucose CSF) 67           45-80           

          



[facility]2022-03-25 18:36:00





             Test Item    Value        Reference Range Interpretation Comments

 

             Tube Num CSF (test xxxxxxx (3/25/22 1:36                           



             code = Tube Num CSF) PM)                                    



[facility]2022-03-25 18:36:00





             Test Item    Value        Reference Range Interpretation Comments

 

             Color CSF (test code Colorless (3/25/22 1:36                       

    



             = Color CSF) PM)                                    



[facility]2022-03-25 18:36:00





             Test Item    Value        Reference Range Interpretation Comments

 

             Clarity CSF (test code = Clear (3/25/22 1:36                       

    



             Clarity CSF) PM)                                    



[facility]2022-03-25 18:36:00





             Test Item    Value        Reference Range Interpretation Comments

 

             Supernat CSF (test Colorless (3/25/22 1:36                         

  



             code = Supernat CSF) PM)                                    



[facility]2022-03-25 18:36:00





             Test Item    Value        Reference Range Interpretation Comments

 

             Nucleated Cells CSF 0            See_Comment                [Automa

huma message] The



             (test code = Nucleated                                        syste

m which generated



             Cells CSF)                                          this result tra

nsmitted



                                                                 reference range

: <=53.



                                                                 The reference r

zhen was



                                                                 not used to int

erpret



                                                                 this result as



                                                                 normal/abnormal

.



[facility] [id number] 18:36:00





             Test Item    Value        Reference Range Interpretation Comments

 

             RBC CSF (test code = 0            See_Comment                [Autom

ated message] The



             RBC CSF)                                            system which ge

nerated this



                                                                 result transmit

huma



                                                                 reference range

: <=03. The



                                                                 reference range

 was not



                                                                 used to interpr

et this



                                                                 result as zayda

l/abnormal.



[facility] [id number] 18:36:00





             Test Item    Value        Reference Range Interpretation Comments

 

             Comment CSF (test Differential not                           



             code = Comment CSF) performed on WBC count of                      

     



                          less than 5.                           



[facility] [id number] 18:36:00





             Test Item    Value        Reference Range Interpretation Comments

 

             Gram Stain Report Gram Stain Performed By:                         

  



             (test code = Gram [facility]                           



             Stain Report) [facility] CSF w/Gram [id number] 18:36:00





             Test Item    Value        Reference Range Interpretation Comments

 

             Culture: CSF w/Gram Stain (test No Growth                          

    



             code = Culture: CSF w/Gram Stain)                                  

      



[id number]:28:[facility]





             Test Item    Value        Reference Range Interpretation Comments

 

             Coronavirus (COVID-19) Not Detected (3/25/22                       

    



             ZEYAD (test code = 3:28 AM)                               



             Coronavirus (COVID-19)                                        



             ZEYAD)                                                



[facility] CenterGgylrygZBNSFJOQWW0233-74-81 08:28:00





             Test Item    Value        Reference Range Interpretation Comments

 

             Coronavirus (COVID-19) Not Detected (3/25/22                       

    



             ZEYAD (test code = 3:28 AM)                               



             Coronavirus (COVID-19)                                        



             ZEYAD)                                                



[place] [address]-03-25 08:28:00





             Test Item    Value        Reference Range Interpretation Comments

 

             Coronavirus (COVID-19) Not Detected (3/25/22                       

    



             ZEYAD (test code = 3:28 AM)                               



             Coronavirus (COVID-19)                                        



             ZEYAD)                                                



[place] [address]-03-25 08:28:00





             Test Item    Value        Reference Range Interpretation Comments

 

             Coronavirus (COVID-19) Not Detected (3/25/22                       

    



             ZEYAD (test code = 3:28 AM)                               



             Coronavirus (COVID-19)                                        



             ZEYAD)                                                



[facility] [id number] 08:28:00





             Test Item    Value        Reference Range Interpretation Comments

 

             Coronavirus (COVID-19) Not Detected (3/25/22                       

    



             ZEYAD (test code = 3:28 AM)                               



             Coronavirus (COVID-19)                                        



             ZEYAD)                                                



[facility] CenterIhynvfxKCGFKJEKEQ2341-96-70 08:28:00





             Test Item    Value        Reference Range Interpretation Comments

 

             Coronavirus (COVID-19) Not Detected (3/25/22                       

    



             ZEYAD (test code = 3:28 AM)                               



             Coronavirus (COVID-19)                                        



             ZEYAD)                                                



[facility] CenterItdnrmqDTFLBDPWSG8130-36-05 08:28:00





             Test Item    Value        Reference Range Interpretation Comments

 

             Coronavirus (COVID-19) Not Detected (3/25/22                       

    



             ZEYAD (test code = 3:28 AM)                               



             Coronavirus (COVID-19)                                        



             ZEYAD)                                                



[facility] C[id number] 08:28:00





             Test Item    Value        Reference Range Interpretation Comments

 

             Coronavirus (COVID-19) Not Detected (3/25/22                       

    



             ZEYAD (test code = 3:28 AM)                               



             Coronavirus (COVID-19)                                        



             ZEYAD)                                                



[facility] [id number] 06:48:00





             Test Item    Value        Reference Range Interpretation Comments

 

             Antibody Scrn (test Negative (3/25/22 1:48                         

  



             code = Antibody Scrn) AM)                                    



[facility] [id number] 06:48:00





             Test Item    Value        Reference Range Interpretation Comments

 

             ABO/Rh (test code = ABO/Rh) AB POS                                 



[facility]D[id number] 06:48:00





             Test Item    Value        Reference Range Interpretation Comments

 

             Segs (test code = Segs) 70.8         45.0-75.0                 



[facility] DallasEivwbydTLCXLNYKXO2385-23-32 06:48:00





             Test Item    Value        Reference Range Interpretation Comments

 

             Lymphocytes (test code = Lymphocytes) 20.8         20.0-40.0       

          



[facility] CenterHdedpyuDCGMYTBMGJ6532-09-92 06:48:00





             Test Item    Value        Reference Range Interpretation Comments

 

             Monocytes (test code = Monocytes) 5.8          2.0-12.0            

      



[facility] CenterMuofhfvORWSESIAXY8005-29-29 06:48:00





             Test Item    Value        Reference Range Interpretation Comments

 

             Eosinophils (test code = 2.3          See_Comment                [A

utomated message] The



             Eosinophils)                                        system which ge

nerated



                                                                 this result tra

nsmitted



                                                                 reference range

: <=4.0.



                                                                 The reference r

zhen was



                                                                 not used to int

erpret



                                                                 this result as



                                                                 normal/abnormal

.



[place] 732852-03-25 06:48:00





             Test Item    Value        Reference Range Interpretation Comments

 

             Basophils (test code = 0.3          See_Comment                [Aut

omated message] The



             Basophils)                                          system which ge

nerated



                                                                 this result tra

nsmitted



                                                                 reference range

: <=1.0.



                                                                 The reference r

zhen was



                                                                 not used to int

erpret



                                                                 this result as



                                                                 normal/abnormal

.



[facility] CenterKeqecdfWRETXTTEAM9841-37-30 06:48:00





             Test Item    Value        Reference Range Interpretation Comments

 

             Neutrophils # (test code = Neutrophils 8.4          1.5-8.1        

           



             #)                                                  



[place] 732852-03-25 06:48:00





             Test Item    Value        Reference Range Interpretation Comments

 

             Lymphocytes # (test code = Lymphocytes 2.5          1.0-5.5        

           



             #)                                                  



[place] 732852-03-25 06:48:00





             Test Item    Value        Reference Range Interpretation Comments

 

             Monocytes # (test code 0.7          See_Comment                [Aut

omated message] The



             = Monocytes #)                                        system which 

generated



                                                                 this result tra

nsmitted



                                                                 reference range

: <=0.8.



                                                                 The reference r

zhen was



                                                                 not used to int

erpret



                                                                 this result as



                                                                 normal/abnormal

.



[place] 732852-03-25 06:48:00





             Test Item    Value        Reference Range Interpretation Comments

 

             Eosinophils # (test code 0.3          See_Comment                [A

utomated message] The



             = Eosinophils #)                                        system whic

h generated



                                                                 this result tra

nsmitted



                                                                 reference range

: <=0.5.



                                                                 The reference r

zhen was



                                                                 not used to int

erpret



                                                                 this result as



                                                                 normal/abnormal

.



[facility] C[id number] 06:48:00





             Test Item    Value        Reference Range Interpretation Comments

 

             WBC X 10x3 (test code = WBC X 10x3) 11.9         3.7-10.4          

        



[place] 732852-03-25 06:48:00





             Test Item    Value        Reference Range Interpretation Comments

 

             RBC X 10x6 (test code = RBC X 10x6) 5.95         4.70-6.10         

        



[place] 732852-03-25 06:48:00





             Test Item    Value        Reference Range Interpretation Comments

 

             Hgb (test code = Hgb) 17.9         14.0-18.0                 



[facility] HospitalKtdlshcXAFDHUZFUO8970-22-55 06:48:00





             Test Item    Value        Reference Range Interpretation Comments

 

             Hct (test code = Hct) 52.0         42.0-54.0                 



[facility] DallasSwkozrtUAIFQUPIPB6377-71-38 06:48:00





             Test Item    Value        Reference Range Interpretation Comments

 

             MCV (test code = MCV) 87.5         80.0-94.0                 



[facility] HospitalUhznurnKQTCLJPUIX9780-77-45 06:48:00





             Test Item    Value        Reference Range Interpretation Comments

 

             MCH (test code = MCH) 30.2 pg      27.0-31.0                 



[facility] HospitalAdsldetDIPCLTACSO9004-59-28 06:48:00





             Test Item    Value        Reference Range Interpretation Comments

 

             MCHC (test code = MCHC) 34.5         32.0-36.0                 



[facility] HospitalIfdplodALEVGEAHGK1748-82-10 06:48:00





             Test Item    Value        Reference Range Interpretation Comments

 

             RDW (test code = RDW) 15.4         11.5-14.5                 



[facility] HospitalPyvcrtwURDAVAYDAX3849-05-09 06:48:00





             Test Item    Value        Reference Range Interpretation Comments

 

             Platelet (test code = Platelet) 414          133-450               

    



[facility] HospitalMgpbzsyVILKYSWYOH7716-25-73 06:48:00





             Test Item    Value        Reference Range Interpretation Comments

 

             MPV (test code = MPV) 8.5          7.4-10.4                  



[facility] HospitalJdbogetRGSFUESRZI1081-75-36 06:48:00





             Test Item    Value        Reference Range Interpretation Comments

 

             PT (test code = PT) 12.9 s       12.0-14.7                 



[facility] HospitalRmwphxtITJLKQKCGH5152-06-48 06:48:00





             Test Item    Value        Reference Range Interpretation Comments

 

             INR (test code = INR) 0.98 1       0.85-1.17                 



[facility] HospitalJwsnebhPIGYRONFTS7625-35-58 06:48:00





             Test Item    Value        Reference Range Interpretation Comments

 

             PTT (test code = PTT) 31.4 s       22.9-35.8                 



[facility] Roojoom [id number] 06:48:00





             Test Item    Value        Reference Range Interpretation Comments

 

             Antibody Scrn (test Negative (3/25/22 1:48                         

  



             code = Antibody Scrn) AM)                                    



[facility] [place] [id number] 06:48:00





             Test Item    Value        Reference Range Interpretation Comments

 

             ABO/Rh (test code = ABO/Rh) AB POS                                 



[place] 732852-03-25 06:48:00





             Test Item    Value        Reference Range Interpretation Comments

 

             Segs (test code = Segs) 70.8         45.0-75.0                 



[place] 732852-03-25 06:48:00





             Test Item    Value        Reference Range Interpretation Comments

 

             Lymphocytes (test code = Lymphocytes) 20.8         20.0-40.0       

          



[place] 732852-03-25 06:48:00





             Test Item    Value        Reference Range Interpretation Comments

 

             Monocytes (test code = Monocytes) 5.8          2.0-12.0            

      



[place] [address]-03-25 06:48:00





             Test Item    Value        Reference Range Interpretation Comments

 

             Eosinophils (test code = 2.3          See_Comment                [A

utomated message] The



             Eosinophils)                                        system which ge

nerated



                                                                 this result tra

nsmitted



                                                                 reference range

: <=4.0.



                                                                 The reference r

zhen was



                                                                 not used to int

erpret



                                                                 this result as



                                                                 normal/abnormal

.



[place] [address]-03-25 06:48:00





             Test Item    Value        Reference Range Interpretation Comments

 

             Basophils (test code = 0.3          See_Comment                [Aut

omated message] The



             Basophils)                                          system which ge

nerated



                                                                 this result tra

nsmitted



                                                                 reference range

: <=1.0.



                                                                 The reference r

zhen was



                                                                 not used to int

erpret



                                                                 this result as



                                                                 normal/abnormal

.



[place] 732852-03-25 06:48:00





             Test Item    Value        Reference Range Interpretation Comments

 

             Neutrophils # (test code = Neutrophils 8.4          1.5-8.1        

           



             #)                                                  



[place] 732852-03-25 06:48:00





             Test Item    Value        Reference Range Interpretation Comments

 

             Lymphocytes # (test code = Lymphocytes 2.5          1.0-5.5        

           



             #)                                                  



[place] 732852-03-25 06:48:00





             Test Item    Value        Reference Range Interpretation Comments

 

             Monocytes # (test code 0.7          See_Comment                [Aut

omated message] The



             = Monocytes #)                                        system which 

generated



                                                                 this result tra

nsmitted



                                                                 reference range

: <=0.8.



                                                                 The reference r

zhen was



                                                                 not used to int

erpret



                                                                 this result as



                                                                 normal/abnormal

.



[place] 732852-03-25 06:48:00





             Test Item    Value        Reference Range Interpretation Comments

 

             Eosinophils # (test code 0.3          See_Comment                [A

utomated message] The



             = Eosinophils #)                                        system whic

h generated



                                                                 this result tra

nsmitted



                                                                 reference range

: <=0.5.



                                                                 The reference r

zhen was



                                                                 not used to int

erpret



                                                                 this result as



                                                                 normal/abnormal

.



[facility]unfrrIDXSEWSVZP3418[facility]94-27 06:48:00





             Test Item    Value        Reference Range Interpretation Comments

 

             WBC X 10x3 (test code = WBC X 10x3) 11.9         3.7-10.4          

        



[place]32852-03-25 06:48:00





             Test Item    Value        Reference Range Interpretation Comments

 

             RBC X 10x6 (test code = RBC X 10x6) 5.95         4.70-6.10         

        



[place] 732852-03-25 06:48:00





             Test Item    Value        Reference Range Interpretation Comments

 

             Hgb (test code = Hgb) 17.9         14.0-18.0                 



[place] 732852-03-25 06:48:00





             Test Item    Value        Reference Range Interpretation Comments

 

             Hct (test code = Hct) 52.0         42.0-54.0                 



[place] [address]-03-25 06:48:00





             Test Item    Value        Reference Range Interpretation Comments

 

             MCV (test code = MCV) 87.5         80.0-94.0                 



[place] [address]-03-25 06:48:00





             Test Item    Value        Reference Range Interpretation Comments

 

             MCH (test code = MCH) 30.2 pg      27.0-31.0                 



[place] 732852-03-25 06:48:00





             Test Item    Value        Reference Range Interpretation Comments

 

             MCHC (test code = MCHC) 34.5         32.0-36.0                 



[place] [address]-03-25 06:48:00





             Test Item    Value        Reference Range Interpretation Comments

 

             RDW (test code = RDW) 15.4         11.5-14.5                 



[place] 732852-03-25 06:48:00





             Test Item    Value        Reference Range Interpretation Comments

 

             Platelet (test code = Platelet) 414          133-450               

    



[place] 732852-03-25 06:48:00





             Test Item    Value        Reference Range Interpretation Comments

 

             MPV (test code = MPV) 8.5          7.4-10.4                  



[place] [address]-03-25 06:48:00





             Test Item    Value        Reference Range Interpretation Comments

 

             PT (test code = PT) 12.9 s       12.0-14.7                 



[place] 732852-03-25 06:48:00





             Test Item    Value        Reference Range Interpretation Comments

 

             INR (test code = INR) 0.98 1       0.85-1.17                 



[facility] CenterRaardwoMNUTLOJLAX4367-21-68 06:48:00





             Test Item    Value        Reference Range Interpretation Comments

 

             PTT (test code = PTT) 31.4 s       22.9-35.8                 



[facility] DNIEKVW5539-27-18 06:48:00





             Test Item    Value        Reference Range Interpretation Comments

 

             Antibody Scrn (test Negative (3/25/22 1:48                         

  



             code = Antibody Scrn) AM)                                    



[facility] [id number] 06:48:00





             Test Item    Value        Reference Range Interpretation Comments

 

             ABO/Rh (test code = ABO/Rh) AB POS                                 



[facility] CenterRjvcoluPZMAVGGJYT1871-56-37 06:48:00





             Test Item    Value        Reference Range Interpretation Comments

 

             Segs (test code = Segs) 70.8         45.0-75.0                 



[facility] CenterIsjjtkwLJKQCBZWBZ1418-68-53 06:48:00





             Test Item    Value        Reference Range Interpretation Comments

 

             Lymphocytes (test code = Lymphocytes) 20.8         20.0-40.0       

          



[facility] CenterZtnxosvXIVRRWJKDM7688-11-00 06:48:00





             Test Item    Value        Reference Range Interpretation Comments

 

             Monocytes (test code = Monocytes) 5.8          2.0-12.0            

      



[facility] CenterPozfstuIERFQYISYE8942-08-59 06:48:00





             Test Item    Value        Reference Range Interpretation Comments

 

             Eosinophils (test code = 2.3          See_Comment                [A

utomated message] The



             Eosinophils)                                        system which ge

nerated



                                                                 this result tra

nsmitted



                                                                 reference range

: <=4.0.



                                                                 The reference r

zhen was



                                                                 not used to int

erpret



                                                                 this result as



                                                                 normal/abnormal

.



[facility] CenterVkfjnuyKXDQJFGOBI8479-94-10 06:48:00





             Test Item    Value        Reference Range Interpretation Comments

 

             Basophils (test code = 0.3          See_Comment                [Aut

omated message] The



             Basophils)                                          system which ge

nerated



                                                                 this result tra

nsmitted



                                                                 reference range

: <=1.0.



                                                                 The reference r

zhen was



                                                                 not used to int

erpret



                                                                 this result as



                                                                 normal/abnormal

.



[facility] CenterPdwudssOJMDLUPBTC0027-70-01 06:48:00





             Test Item    Value        Reference Range Interpretation Comments

 

             Neutrophils # (test code = Neutrophils 8.4          1.5-8.1        

           



             #)                                                  



[facility] CenterDqotlnoXLBUGVKEAO9352-35-06 06:48:00





             Test Item    Value        Reference Range Interpretation Comments

 

             Lymphocytes # (test code = Lymphocytes 2.5          1.0-5.5        

           



             #)                                                  



[facility] CenterFokcmkcKUXQSPOXDN2618-27-80 06:48:00





             Test Item    Value        Reference Range Interpretation Comments

 

             Monocytes # (test code 0.7          See_Comment                [Aut

omated message] The



             = Monocytes #)                                        system which 

generated



                                                                 this result tra

nsmitted



                                                                 reference range

: <=0.8.



                                                                 The reference r

zhen was



                                                                 not used to int

erpret



                                                                 this result as



                                                                 normal/abnormal

.



[facility] CenterDtnnpacJROUPMVZMP0774-96-48 06:48:00





             Test Item    Value        Reference Range Interpretation Comments

 

             Eosinophils # (test code 0.3          See_Comment                [A

utomated message] The



             = Eosinophils #)                                        system whic

h generated



                                                                 this result tra

nsmitted



                                                                 reference range

: <=0.5.



                                                                 The reference r

zhen was



                                                                 not used to int

erpret



                                                                 this result as



                                                                 normal/abnormal

.



[facility] CenterGluhkgzRAPONOUAAZ3903-41-13 06:48:00





             Test Item    Value        Reference Range Interpretation Comments

 

             WBC X 10x3 (test code = WBC X 10x3) 11.9         3.7-10.4          

        



[place] 732852-03-25 06:48:00





             Test Item    Value        Reference Range Interpretation Comments

 

             RBC X 10x6 (test code = RBC X 10x6) 5.95         4.70-6.10         

        



[place] 732852-03-25 06:48:00





             Test Item    Value        Reference Range Interpretation Comments

 

             Hgb (test code = Hgb) 17.9         14.0-18.0                 



[place] 732852-03-25 06:48:00





             Test Item    Value        Reference Range Interpretation Comments

 

             Hct (test code = Hct) 52.0         42.0-54.0                 



[place] 732852-03-25 06:48:00





             Test Item    Value        Reference Range Interpretation Comments

 

             MCV (test code = MCV) 87.5         80.0-94.0                 



[place] 732852-03-25 06:48:00





             Test Item    Value        Reference Range Interpretation Comments

 

             MCH (test code = MCH) 30.2 pg      27.0-31.0                 



[place] 732852-03-25 06:48:00





             Test Item    Value        Reference Range Interpretation Comments

 

             MCHC (test code = MCHC) 34.5         32.0-36.0                 



[place] 732852-03-25 06:48:00





             Test Item    Value        Reference Range Interpretation Comments

 

             RDW (test code = RDW) 15.4         11.5-14.5                 



[facility] HospitalMdojbtjHHVKOFMSDK7530-19-63 06:48:00





             Test Item    Value        Reference Range Interpretation Comments

 

             Platelet (test code = Platelet) 414          133-450               

    



[facility] CenterBooqewuJPFEDSRISI3979-99-56 06:48:00





             Test Item    Value        Reference Range Interpretation Comments

 

             MPV (test code = MPV) 8.5          7.4-10.4                  



[facility] CenterPwfbvwzARUUZGQENC4309-68-21 06:48:00





             Test Item    Value        Reference Range Interpretation Comments

 

             PT (test code = PT) 12.9 s       12.0-14.7                 



[facility] HospitalDrdrvkkNNODHSMGEI4256-56-14 06:48:00





             Test Item    Value        Reference Range Interpretation Comments

 

             INR (test code = INR) 0.98 1       0.85-1.17                 



[facility] CenterSzqytdeBBBTTZNHKE2122-30-88 06:48:00





             Test Item    Value        Reference Range Interpretation Comments

 

             PTT (test code = PTT) 31.4 s       22.9-35.8                 



[facility] YOCQCQA2770-60-93 06:48:00





             Test Item    Value        Reference Range Interpretation Comments

 

             Antibody Scrn (test Negative (3/25/22 1:48                         

  



             code = Antibody Scrn) AM)                                    



[facility] Roojoom PRPAIWN5912-76-22 06:48:00





             Test Item    Value        Reference Range Interpretation Comments

 

             ABO/Rh (test code = ABO/Rh) AB POS                                 



[facility]QteeeycWUGGOYBTKC2658[facility]91[facility]96 [facility]





             Test Item    Value        Reference Range Interpretation Comments

 

             Segs (test code = Segs) 70.8         45.0-75.0                 



[facility] DallasEnratyyYEGZDLDJGI4793-48-41 06:48:00





             Test Item    Value        Reference Range Interpretation Comments

 

             Lymphocytes (test code = Lymphocytes) 20.8         20.0-40.0       

          



[facility] HospitalWsokzxkXQZLMSEJXP3046-93-88 06:48:00





             Test Item    Value        Reference Range Interpretation Comments

 

             Monocytes (test code = Monocytes) 5.8          2.0-12.0            

      



[facility] DallasGcrmjucKKYCBCYVEB8473-71-97 06:48:00





             Test Item    Value        Reference Range Interpretation Comments

 

             Eosinophils (test code = 2.3          See_Comment                [A

utomated message] The



             Eosinophils)                                        system which ge

nerated



                                                                 this result tra

nsmitted



                                                                 reference range

: <=4.0.



                                                                 The reference r

zhen was



                                                                 not used to int

erpret



                                                                 this result as



                                                                 normal/abnormal

.



[place] 732852-03-25 06:48:00





             Test Item    Value        Reference Range Interpretation Comments

 

             Basophils (test code = 0.3          See_Comment                [Aut

omated message] The



             Basophils)                                          system which ge

nerated



                                                                 this result tra

nsmitted



                                                                 reference range

: <=1.0.



                                                                 The reference r

zhen was



                                                                 not used to int

erpret



                                                                 this result as



                                                                 normal/abnormal

.



[place] 732852-03-25 06:48:00





             Test Item    Value        Reference Range Interpretation Comments

 

             Neutrophils # (test code = Neutrophils 8.4          1.5-8.1        

           



             #)                                                  



[place] 732852-03-25 06:48:00





             Test Item    Value        Reference Range Interpretation Comments

 

             Lymphocytes # (test code = Lymphocytes 2.5          1.0-5.5        

           



             #)                                                  



[place] 732852-03-25 06:48:00





             Test Item    Value        Reference Range Interpretation Comments

 

             Monocytes # (test code 0.7          See_Comment                [Aut

omated message] The



             = Monocytes #)                                        system which 

generated



                                                                 this result tra

nsmitted



                                                                 reference range

: <=0.8.



                                                                 The reference r

zhen was



                                                                 not used to int

erpret



                                                                 this result as



                                                                 normal/abnormal

.



[place] 732852-03-25 06:48:00





             Test Item    Value        Reference Range Interpretation Comments

 

             Eosinophils # (test code 0.3          See_Comment                [A

utomated message] The



             = Eosinophils #)                                        system whic

h generated



                                                                 this result tra

nsmitted



                                                                 reference range

: <=0.5.



                                                                 The reference r

zhen was



                                                                 not used to int

erpret



                                                                 this result as



                                                                 normal/abnormal

.



[facility] CenterMjlnhpgBYFSVMCNUL0711-84-48 06:48:00





             Test Item    Value        Reference Range Interpretation Comments

 

             WBC X 10x3 (test code = WBC X 10x3) 11.9         3.7-10.4          

        



[place] 732852-03-25 06:48:00





             Test Item    Value        Reference Range Interpretation Comments

 

             RBC X 10x6 (test code = RBC X 10x6) 5.95         4.70-6.10         

        



[place] 732852-03-25 06:48:00





             Test Item    Value        Reference Range Interpretation Comments

 

             Hgb (test code = Hgb) 17.9         14.0-18.0                 



[place] 732852-03-25 06:48:00





             Test Item    Value        Reference Range Interpretation Comments

 

             Hct (test code = Hct) 52.0         42.0-54.0                 



[facility] DallasTirjndrDPNWEKQIOF0049-56-26 06:48:00





             Test Item    Value        Reference Range Interpretation Comments

 

             MCV (test code = MCV) 87.5         80.0-94.0                 



[facility] CenterFffoebjSUMTTBKVLW9390-48-90 06:48:00





             Test Item    Value        Reference Range Interpretation Comments

 

             MCH (test code = MCH) 30.2 pg      27.0-31.0                 



[facility] DallasGvzefjcJLMLVVILND6076-91-25 06:48:00





             Test Item    Value        Reference Range Interpretation Comments

 

             MCHC (test code = MCHC) 34.5         32.0-36.0                 



[facility] HospitalUzinpqbRORJCEXVKQ4900-62-36 06:48:00





             Test Item    Value        Reference Range Interpretation Comments

 

             RDW (test code = RDW) 15.4         11.5-14.5                 



[facility] DallasQbydmgcAMZKRCKUDN0397-25-23 06:48:00





             Test Item    Value        Reference Range Interpretation Comments

 

             Platelet (test code = Platelet) 414          133-450               

    



[facility] DallasMxstclfCMSFVEBTBB8901-34-87 06:48:00





             Test Item    Value        Reference Range Interpretation Comments

 

             MPV (test code = MPV) 8.5          7.4-10.4                  



[facility] HospitalQxdffnuYDIZSZIROS7820-73-80 06:48:00





             Test Item    Value        Reference Range Interpretation Comments

 

             PT (test code = PT) 12.9 s       12.0-14.7                 



[facility] DallasWcoqhqgVPEFEYVCUK7896-26-26 06:48:00





             Test Item    Value        Reference Range Interpretation Comments

 

             INR (test code = INR) 0.98 1       0.85-1.17                 



[facility] DallasSnziarpZGNFAGJRGJ7108-08-01 06:48:00





             Test Item    Value        Reference Range Interpretation Comments

 

             PTT (test code = PTT) 31.4 s       22.9-35.8                 



[facility] [place] ALBKZMX5462-08-30 06:48:00





             Test Item    Value        Reference Range Interpretation Comments

 

             Antibody Scrn (test Negative (3/25/22 1:48                         

  



             code = Antibody Scrn) AM)                                    



[facility] BIRKKAO8734-20-76 06:48:00





             Test Item    Value        Reference Range Interpretation Comments

 

             ABO/Rh (test code = ABO/Rh) AB POS                                 



[facility] HospitalFupkdwqCENQOOGLCX7670-74-05 06:48:00





             Test Item    Value        Reference Range Interpretation Comments

 

             Segs (test code = Segs) 70.8         45.0-75.0                 



[place] 732852-03-25 06:48:00





             Test Item    Value        Reference Range Interpretation Comments

 

             Lymphocytes (test code = Lymphocytes) 20.8         20.0-40.0       

          



[place] 732852-03-25 06:48:00





             Test Item    Value        Reference Range Interpretation Comments

 

             Monocytes (test code = Monocytes) 5.8          2.0-12.0            

      



[place] 732852-03-25 06:48:00





             Test Item    Value        Reference Range Interpretation Comments

 

             Eosinophils (test code = 2.3          See_Comment                [A

utomated message] The



             Eosinophils)                                        system which ge

nerated



                                                                 this result tra

nsmitted



                                                                 reference range

: <=4.0.



                                                                 The reference r

hzen was



                                                                 not used to int

erpret



                                                                 this result as



                                                                 normal/abnormal

.



[place] 732852-03-25 06:48:00





             Test Item    Value        Reference Range Interpretation Comments

 

             Basophils (test code = 0.3          See_Comment                [Aut

omated message] The



             Basophils)                                          system which ge

nerated



                                                                 this result tra

nsmitted



                                                                 reference range

: <=1.0.



                                                                 The reference r

zhen was



                                                                 not used to int

erpret



                                                                 this result as



                                                                 normal/abnormal

.



[facility] CenterWklrsymFIHLEJCMLF5842-62-44 06:48:00





             Test Item    Value        Reference Range Interpretation Comments

 

             Neutrophils # (test code = Neutrophils 8.4          1.5-8.1        

           



             #)                                                  



[place] 732852-03-25 06:48:00





             Test Item    Value        Reference Range Interpretation Comments

 

             Lymphocytes # (test code = Lymphocytes 2.5          1.0-5.5        

           



             #)                                                  



[place] 732852-03-25 06:48:00





             Test Item    Value        Reference Range Interpretation Comments

 

             Monocytes # (test code 0.7          See_Comment                [Aut

omated message] The



             = Monocytes #)                                        system which 

generated



                                                                 this result tra

nsmitted



                                                                 reference range

: <=0.8.



                                                                 The reference r

zhen was



                                                                 not used to int

erpret



                                                                 this result as



                                                                 normal/abnormal

.



[place] 732852-03-25 06:48:00





             Test Item    Value        Reference Range Interpretation Comments

 

             Eosinophils # (test code 0.3          See_Comment                [A

utomated message] The



             = Eosinophils #)                                        system ic

h generated



                                                                 this result tra

nsmitted



                                                                 reference range

: <=0.5.



                                                                 The reference r

zhen was



                                                                 not used to int

erpret



                                                                 this result as



                                                                 normal/abnormal

.



[place]32852-03-25 06:48:00





             Test Item    Value        Reference Range Interpretation Comments

 

             WBC X 10x3 (test code = WBC X 10x3) 11.9         3.7-10.4          

        



[place] 732852-03-25 06:48:00





             Test Item    Value        Reference Range Interpretation Comments

 

             RBC X 10x6 (test code = RBC X 10x6) 5.95         4.70-6.10         

        



[place] [address]-03-25 06:48:00





             Test Item    Value        Reference Range Interpretation Comments

 

             Hgb (test code = Hgb) 17.9         14.0-18.0                 



[place] [address]-03-25 06:48:00





             Test Item    Value        Reference Range Interpretation Comments

 

             Hct (test code = Hct) 52.0         42.0-54.0                 



[place] [address]-03-25 06:48:00





             Test Item    Value        Reference Range Interpretation Comments

 

             MCV (test code = MCV) 87.5         80.0-94.0                 



[place] [address]-03-25 06:48:00





             Test Item    Value        Reference Range Interpretation Comments

 

             MCH (test code = MCH) 30.2 pg      27.0-31.0                 



[place] [address]-03-25 06:48:00





             Test Item    Value        Reference Range Interpretation Comments

 

             MCHC (test code = MCHC) 34.5         32.0-36.0                 



[place] [address]-03-25 06:48:00





             Test Item    Value        Reference Range Interpretation Comments

 

             RDW (test code = RDW) 15.4         11.5-14.5                 



[place] [address]-03-25 06:48:00





             Test Item    Value        Reference Range Interpretation Comments

 

             Platelet (test code = Platelet) 414          133-450               

    



[place] 732852-03-25 06:48:00





             Test Item    Value        Reference Range Interpretation Comments

 

             MPV (test code = MPV) 8.5          7.4-10.4                  



[place] [address]-03-25 06:48:00





             Test Item    Value        Reference Range Interpretation Comments

 

             PT (test code = PT) 12.9 s       12.0-14.7                 



[place] [address]-03-25 06:48:00





             Test Item    Value        Reference Range Interpretation Comments

 

             INR (test code = INR) 0.98 1       0.85-1.17                 



[facility]FutznbrBJHGDFZGSB9183[facility]58-50 [facility]48:00





             Test Item    Value        Reference Range Interpretation Comments

 

             PTT (test code = PTT) 31.4 s       22.9-35.8                 



[facility] BUKXVGO5843-43-16 06:48:00





             Test Item    Value        Reference Range Interpretation Comments

 

             Antibody Scrn (test Negative (3/25/22 1:48                         

  



             code = Antibody Scrn) AM)                                    



[facility] [id number] 06:48:00





             Test Item    Value        Reference Range Interpretation Comments

 

             ABO/Rh (test code = ABO/Rh) AB POS                                 



[facility] CenterApehlmmRZPDWUTWVQ0110-75-89 06:48:00





             Test Item    Value        Reference Range Interpretation Comments

 

             Segs (test code = Segs) 70.8         45.0-75.0                 



[facility] CenterAevtppdNUFRVFERKL2842-44-73 06:48:00





             Test Item    Value        Reference Range Interpretation Comments

 

             Lymphocytes (test code = Lymphocytes) 20.8         20.0-40.0       

          



[facility] CenterVfgofwxKHSIVCTPZD4793-87-78 06:48:00





             Test Item    Value        Reference Range Interpretation Comments

 

             Monocytes (test code = Monocytes) 5.8          2.0-12.0            

      



[facility] CenterDmauxtpISCWMUWSZL3625-18-22 06:48:00





             Test Item    Value        Reference Range Interpretation Comments

 

             Eosinophils (test code = 2.3          See_Comment                [A

utomated message] The



             Eosinophils)                                        system which ge

nerated



                                                                 this result tra

nsmitted



                                                                 reference range

: <=4.0.



                                                                 The reference r

zhen was



                                                                 not used to int

erpret



                                                                 this result as



                                                                 normal/abnormal

.



[facility] CenterRxfqsagNYIQWVTJHL1979-90-16 06:48:00





             Test Item    Value        Reference Range Interpretation Comments

 

             Basophils (test code = 0.3          See_Comment                [Aut

omated message] The



             Basophils)                                          system which ge

nerated



                                                                 this result tra

nsmitted



                                                                 reference range

: <=1.0.



                                                                 The reference r

zhen was



                                                                 not used to int

erpret



                                                                 this result as



                                                                 normal/abnormal

.



[facility] CenterVmmgmhxXCJGXTQQKL0915-33-72 06:48:00





             Test Item    Value        Reference Range Interpretation Comments

 

             Neutrophils # (test code = Neutrophils 8.4          1.5-8.1        

           



             #)                                                  



[facility] CenterXubnbysYABVKFRTZR7594-02-75 06:48:00





             Test Item    Value        Reference Range Interpretation Comments

 

             Lymphocytes # (test code = Lymphocytes 2.5          1.0-5.5        

           



             #)                                                  



[place] 732852-03-25 06:48:00





             Test Item    Value        Reference Range Interpretation Comments

 

             Monocytes # (test code 0.7          See_Comment                [Aut

omated message] The



             = Monocytes #)                                        system which 

generated



                                                                 this result tra

nsmitted



                                                                 reference range

: <=0.8.



                                                                 The reference r

zhen was



                                                                 not used to int

erpret



                                                                 this result as



                                                                 normal/abnormal

.



[facility] CenterOelupbfSMRSMDGAAS5987-34-49 06:48:00





             Test Item    Value        Reference Range Interpretation Comments

 

             Eosinophils # (test code 0.3          See_Comment                [A

utomated message] The



             = Eosinophils #)                                        system whic

h generated



                                                                 this result tra

nsmitted



                                                                 reference range

: <=0.5.



                                                                 The reference r

zhen was



                                                                 not used to int

erpret



                                                                 this result as



                                                                 normal/abnormal

.



[facility] CenterJncujpvNNGVSHFSTU7405-71-18 06:48:00





             Test Item    Value        Reference Range Interpretation Comments

 

             WBC X 10x3 (test code = WBC X 10x3) 11.9         3.7-10.4          

        



[place] 732852-03-25 06:48:00





             Test Item    Value        Reference Range Interpretation Comments

 

             RBC X 10x6 (test code = RBC X 10x6) 5.95         4.70-6.10         

        



[facility] CenterAdfkjllEDMBTXCPMD5417-24-34 06:48:00





             Test Item    Value        Reference Range Interpretation Comments

 

             Hgb (test code = Hgb) 17.9         14.0-18.0                 



[facility] CenterZjhsoawFUIENKMWYY6086-02-54 06:48:00





             Test Item    Value        Reference Range Interpretation Comments

 

             Hct (test code = Hct) 52.0         42.0-54.0                 



[place] 732852-03-25 06:48:00





             Test Item    Value        Reference Range Interpretation Comments

 

             MCV (test code = MCV) 87.5         80.0-94.0                 



[place] 732852-03-25 06:48:00





             Test Item    Value        Reference Range Interpretation Comments

 

             MCH (test code = MCH) 30.2 pg      27.0-31.0                 



[place] 732852-03-25 06:48:00





             Test Item    Value        Reference Range Interpretation Comments

 

             MCHC (test code = MCHC) 34.5         32.0-36.0                 



[place] 732852-03-25 06:48:00





             Test Item    Value        Reference Range Interpretation Comments

 

             RDW (test code = RDW) 15.4         11.5-14.5                 



[facility]ZkcjanjQJOMYJDTWS7198[facility]





             Test Item    Value        Reference Range Interpretation Comments

 

             Platelet (test code = Platelet) 414          133-450               

    



[facility] DallasCpjjbzfEOWABRMLLI7386-31-24 06:48:00





             Test Item    Value        Reference Range Interpretation Comments

 

             MPV (test code = MPV) 8.5          7.4-10.4                  



[facility] DallasTkjntzlWXUWMOHMVN3498-97-15 06:48:00





             Test Item    Value        Reference Range Interpretation Comments

 

             PT (test code = PT) 12.9 s       12.0-14.7                 



[facility] HospitalEqadhzrIXHXOVKUOY3950-09-52 06:48:00





             Test Item    Value        Reference Range Interpretation Comments

 

             INR (test code = INR) 0.98 1       0.85-1.17                 



[facility] HospitalRkuehtaJOAFJPCRFB2095-09-98 06:48:00





             Test Item    Value        Reference Range Interpretation Comments

 

             PTT (test code = PTT) 31.4 s       22.9-35.8                 



[facility] Roojoom HOPUPUP1077-76-03 06:48:00





             Test Item    Value        Reference Range Interpretation Comments

 

             Antibody Scrn (test Negative (3/25/22 1:48                         

  



             code = Antibody Scrn) AM)                                    



[facility] CXQWTUQ4932-75-32 06:48:00





             Test Item    Value        Reference Range Interpretation Comments

 

             ABO/Rh (test code = ABO/Rh) AB POS                                 



[facility] DallasYukmcbzMKBAGNQHKT4780-85-10 06:48:00





             Test Item    Value        Reference Range Interpretation Comments

 

             Segs (test code = Segs) 70.8         45.0-75.0                 



[facility] HospitalLbjikdjLMSUFUBRVU0586-81-54 06:48:00





             Test Item    Value        Reference Range Interpretation Comments

 

             Lymphocytes (test code = Lymphocytes) 20.8         20.0-40.0       

          



[facility] HospitalYgakdwnMDIKMPSLRK1114-81-83 06:48:00





             Test Item    Value        Reference Range Interpretation Comments

 

             Monocytes (test code = Monocytes) 5.8          2.0-12.0            

      



[facility] HospitalDjiyrmfYTWAIUOCVY3274-38-02 06:48:00





             Test Item    Value        Reference Range Interpretation Comments

 

             Eosinophils (test code = 2.3          See_Comment                [A

utomated message] The



             Eosinophils)                                        system which ge

nerated



                                                                 this result tra

nsmitted



                                                                 reference range

: <=4.0.



                                                                 The reference r

zhen was



                                                                 not used to int

erpret



                                                                 this result as



                                                                 normal/abnormal

.



[facility] CenterYyyurafPALBLBWKWN6998-80-65 06:48:00





             Test Item    Value        Reference Range Interpretation Comments

 

             Basophils (test code = 0.3          See_Comment                [Aut

omated message] The



             Basophils)                                          system which ge

nerated



                                                                 this result tra

nsmitted



                                                                 reference range

: <=1.0.



                                                                 The reference r

zhen was



                                                                 not used to int

erpret



                                                                 this result as



                                                                 normal/abnormal

.



[facility] CenterJokzelbDFSWOJJVTG0501-07-25 06:48:00





             Test Item    Value        Reference Range Interpretation Comments

 

             Neutrophils # (test code = Neutrophils 8.4          1.5-8.1        

           



             #)                                                  



[place] 732852-03-25 06:48:00





             Test Item    Value        Reference Range Interpretation Comments

 

             Lymphocytes # (test code = Lymphocytes 2.5          1.0-5.5        

           



             #)                                                  



[facility] CenterAgyonncGDSSVOHTFO8810-69-98 06:48:00





             Test Item    Value        Reference Range Interpretation Comments

 

             Monocytes # (test code 0.7          See_Comment                [Aut

omated message] The



             = Monocytes #)                                        system which 

generated



                                                                 this result tra

nsmitted



                                                                 reference range

: <=0.8.



                                                                 The reference r

zhen was



                                                                 not used to int

erpret



                                                                 this result as



                                                                 normal/abnormal

.



[facility] CenterFharhxwHNCTAJXBRG0502-06-66 06:48:00





             Test Item    Value        Reference Range Interpretation Comments

 

             Eosinophils # (test code 0.3          See_Comment                [A

utomated message] The



             = Eosinophils #)                                        system whic

h generated



                                                                 this result tra

nsmitted



                                                                 reference range

: <=0.5.



                                                                 The reference r

zhen was



                                                                 not used to int

erpret



                                                                 this result as



                                                                 normal/abnormal

.



[facility] CenterPxcodcwZNHQFGONZB4791-41-32 06:48:00





             Test Item    Value        Reference Range Interpretation Comments

 

             WBC X 10x3 (test code = WBC X 10x3) 11.9         3.7-10.4          

        



[place] 732852-03-25 06:48:00





             Test Item    Value        Reference Range Interpretation Comments

 

             RBC X 10x6 (test code = RBC X 10x6) 5.95         4.70-6.10         

        



[place] 732852-03-25 06:48:00





             Test Item    Value        Reference Range Interpretation Comments

 

             Hgb (test code = Hgb) 17.9         14.0-18.0                 



[place] 732852-03-25 06:48:00





             Test Item    Value        Reference Range Interpretation Comments

 

             Hct (test code = Hct) 52.0         42.0-54.0                 



[place] [address]-03-25 06:48:00





             Test Item    Value        Reference Range Interpretation Comments

 

             MCV (test code = MCV) 87.5         80.0-94.0                 



[facility] CenterJzpktipCGDFNQXPWX8530-87-94 06:48:00





             Test Item    Value        Reference Range Interpretation Comments

 

             MCH (test code = MCH) 30.2 pg      27.0-31.0                 



[facility] CenterTczjgyhEOQWAFKHRV3833-15-16 06:48:00





             Test Item    Value        Reference Range Interpretation Comments

 

             MCHC (test code = MCHC) 34.5         32.0-36.0                 



[facility] CenterEzkygivACMRKNPTZT8215-35-66 06:48:00





             Test Item    Value        Reference Range Interpretation Comments

 

             RDW (test code = RDW) 15.4         11.5-14.5                 



[facility] HospitalIknlqrqNGENVODRNB9668-92-50 06:48:00





             Test Item    Value        Reference Range Interpretation Comments

 

             Platelet (test code = Platelet) 414          133-450               

    



[facility] CenterApdskajLTLSNQLEXO1374-98-99 06:48:00





             Test Item    Value        Reference Range Interpretation Comments

 

             MPV (test code = MPV) 8.5          7.4-10.4                  



[facility] CenterPanywsiXHWGZZESPG2773-09-45 06:48:00





             Test Item    Value        Reference Range Interpretation Comments

 

             PT (test code = PT) 12.9 s       12.0-14.7                 



[facility] HospitalXqxmygjLLMMPLJOLG2252-65-16 06:48:00





             Test Item    Value        Reference Range Interpretation Comments

 

             INR (test code = INR) 0.98 1       0.85-1.17                 



[facility] CenterOvmyiksPBYJSQUHCY1701-95-54 06:48:00





             Test Item    Value        Reference Range Interpretation Comments

 

             PTT (test code = PTT) 31.4 s       22.9-35.8                 



[facility] [place] ELIJKEK0204-56-61 06:48:00





             Test Item    Value        Reference Range Interpretation Comments

 

             Antibody Scrn (test Negative (3/25/22 1:48                         

  



             code = Antibody Scrn) AM)                                    



[facility] [place] [id number] 06:48:00





             Test Item    Value        Reference Range Interpretation Comments

 

             ABO/Rh (test code = ABO/Rh) AB POS                                 



[facility] HospitalDzfknbkVDAPGJIFUF6495-17-32 06:48:00





             Test Item    Value        Reference Range Interpretation Comments

 

             Segs (test code = Segs) 70.8         45.0-75.0                 



[facility] DallasXadpyruDUYLQEFZNP8687-87-74 06:48:00





             Test Item    Value        Reference Range Interpretation Comments

 

             Lymphocytes (test code = Lymphocytes) 20.8         20.0-40.0       

          



[place] 732852-03-25 06:48:00





             Test Item    Value        Reference Range Interpretation Comments

 

             Monocytes (test code = Monocytes) 5.8          2.0-12.0            

      



[place] 732852-03-25 06:48:00





             Test Item    Value        Reference Range Interpretation Comments

 

             Eosinophils (test code = 2.3          See_Comment                [A

utomated message] The



             Eosinophils)                                        system which ge

nerated



                                                                 this result tra

nsmitted



                                                                 reference range

: <=4.0.



                                                                 The reference r

zhen was



                                                                 not used to int

erpret



                                                                 this result as



                                                                 normal/abnormal

.



[place] 732852-03-25 06:48:00





             Test Item    Value        Reference Range Interpretation Comments

 

             Basophils (test code = 0.3          See_Comment                [Aut

omated message] The



             Basophils)                                          system which ge

nerated



                                                                 this result tra

nsmitted



                                                                 reference range

: <=1.0.



                                                                 The reference r

zhen was



                                                                 not used to int

erpret



                                                                 this result as



                                                                 normal/abnormal

.



[facility] CenterIvashraZSYGOKAXYU8189-61-75 06:48:00





             Test Item    Value        Reference Range Interpretation Comments

 

             Neutrophils # (test code = Neutrophils 8.4          1.5-8.1        

           



             #)                                                  



[place] 732852-03-25 06:48:00





             Test Item    Value        Reference Range Interpretation Comments

 

             Lymphocytes # (test code = Lymphocytes 2.5          1.0-5.5        

           



             #)                                                  



[place] 732852-03-25 06:48:00





             Test Item    Value        Reference Range Interpretation Comments

 

             Monocytes # (test code 0.7          See_Comment                [Aut

omated message] The



             = Monocytes #)                                        system which 

generated



                                                                 this result tra

nsmitted



                                                                 reference range

: <=0.8.



                                                                 The reference r

zhen was



                                                                 not used to int

erpret



                                                                 this result as



                                                                 normal/abnormal

.



[facility] CenterHntggtaPVSQQYDAHQ3037-15-53 06:48:00





             Test Item    Value        Reference Range Interpretation Comments

 

             Eosinophils # (test code 0.3          See_Comment                [A

utomated message] The



             = Eosinophils #)                                        system whic

h generated



                                                                 this result tra

nsmitted



                                                                 reference range

: <=0.5.



                                                                 The reference r

zhen was



                                                                 not used to int

erpret



                                                                 this result as



                                                                 normal/abnormal

.



[facility]ho[facility]HP[facility]HGYPC5179[facility]





             Test Item    Value        Reference Range Interpretation Comments

 

             WBC X 10x3 (test code = WBC X 10x3) 11.9         3.7-10.4          

        



[facility]EgjoggkYGPQUTMCQM6250-06-97 06:48:00





             Test Item    Value        Reference Range Interpretation Comments

 

             RBC X 10x6 (test code = RBC X 10x6) 5.95         4.70-6.10         

        



[facility] CenterFjqvxvrZVDSRVQTKK4521-10-94 06:48:00





             Test Item    Value        Reference Range Interpretation Comments

 

             Hgb (test code = Hgb) 17.9         14.0-18.0                 



[facility] CenterKigybezAKUVZCPIIB6666-40-93 06:48:00





             Test Item    Value        Reference Range Interpretation Comments

 

             Hct (test code = Hct) 52.0         42.0-54.0                 



[facility] CenterSyxhqikAWAYUGHUAT2105-14-50 06:48:00





             Test Item    Value        Reference Range Interpretation Comments

 

             MCV (test code = MCV) 87.5         80.0-94.0                 



[id number] 06:48:00





             Test Item    Value        Reference Range Interpretation Comments

 

             MCH (test code = MCH) 30.2 pg      27.0-31.0                 



[facility] CenterPcrezxzEKIZNIGPSJ5590-22-00 06:48:00





             Test Item    Value        Reference Range Interpretation Comments

 

             MCHC (test code = MCHC) 34.5         32.0-36.0                 



[facility] CenterHddikcbYDEBJOMDVM8438-45-59 06:48:00





             Test Item    Value        Reference Range Interpretation Comments

 

             RDW (test code = RDW) 15.4         11.5-14.5                 



[facility] CenterCcdzmmxZHMSNJTPRQ4838-16-01 06:48:00





             Test Item    Value        Reference Range Interpretation Comments

 

             Platelet (test code = Platelet) 414          133-450               

    



[facility] CenterQwwvnfxPOLSYSDFYX1421-26-72 06:48:00





             Test Item    Value        Reference Range Interpretation Comments

 

             MPV (test code = MPV) 8.5          7.4-10.4                  



[place] 732852-03-25 06:48:00





             Test Item    Value        Reference Range Interpretation Comments

 

             PT (test code = PT) 12.9 s       12.0-14.7                 



[facility] CenterIbpyqsiDBHXKPKBKA0331-89-50 06:48:00





             Test Item    Value        Reference Range Interpretation Comments

 

             INR (test code = INR) 0.98 1       0.85-1.17                 



[place] 732852-03-25 06:48:00





             Test Item    Value        Reference Range Interpretation Comments

 

             PTT (test code = PTT) 31.4 s       22.9-35.8                 



[facility] WITH YIUU4548-85-61 00:23:28





             Test Item    Value        Reference Range Interpretation Comments

 

             WBC (test code =              See_Comment  H             [Automated



             6690-2)                                             message] The sy

stem



                                                                 which generated



                                                                 this result



                                                                 transmitted



                                                                 reference range

:



                                                                 4.20 - 10.70



                                                                 10*3/?L. The



                                                                 reference range

 was



                                                                 not used to



                                                                 interpret this



                                                                 result as



                                                                 normal/abnormal

.

 

             RBC (test code =              See_Comment  H             [Automated



             789-8)                                              message] The sy

stem



                                                                 which generated



                                                                 this result



                                                                 transmitted



                                                                 reference range

:



                                                                 4.26 - 5.52



                                                                 10*6/?L. The



                                                                 reference range

 was



                                                                 not used to



                                                                 interpret this



                                                                 result as



                                                                 normal/abnormal

.

 

             HGB (test code = 18.3 g/dL    12.2-16.4    H            



             718-7)                                              

 

             HCT (test code = 55.6 %       38.4-49.3    H            



             4544-3)                                             

 

             MCV (test code = 89.0 fL      81.7-95.6                 



             787-2)                                              

 

             MCH (test code = 29.3 pg      26.1-32.7                 



             785-6)                                              

 

             MCHC (test code = 32.9 g/dL    31.2-35.0                 



             786-4)                                              

 

             RDW-SD (test code = 47.6 fL      38.5-51.6                 



             12909-9)                                            

 

             RDW-CV (test code = 15.1 %       12.1-15.4                 



             788-0)                                              

 

             PLT (test code =              See_Comment  H             [Automated



             777-3)                                              message] The sy

stem



                                                                 which generated



                                                                 this result



                                                                 transmitted



                                                                 reference range

:



                                                                 150 - 328 10*3/

?L.



                                                                 The reference r

zhen



                                                                 was not used to



                                                                 interpret this



                                                                 result as



                                                                 normal/abnormal

.

 

             MPV (test code = 10.5 fL      9.8-13.0                  



             54723-3)                                            

 

             NRBC/100 WBC (test              See_Comment                [Automat

ed



             code = 1045963859)                                        message] 

The system



                                                                 which generated



                                                                 this result



                                                                 transmitted



                                                                 reference range

:



                                                                 0.0 - 10.0 /100



                                                                 WBCs. The refer

ence



                                                                 range was not u

sed



                                                                 to interpret th

is



                                                                 result as



                                                                 normal/abnormal

.

 

             NRBC x10^3 (test code <0.01        See_Comment                [Auto

mated



             = 4166999123)                                        message] The s

ystem



                                                                 which generated



                                                                 this result



                                                                 transmitted



                                                                 reference range

:



                                                                 10*3/?L. The



                                                                 reference range

 was



                                                                 not used to



                                                                 interpret this



                                                                 result as



                                                                 normal/abnormal

.

 

             GRAN MAT (NEUT) % 66.7 %                                 



             (test code = 770-8)                                        

 

             IMM GRAN % (test code 0.40 %                                 



             = 021985)                                        

 

             LYMPH % (test code = 24.4 %                                 



             736-9)                                              

 

             MONO % (test code = 6.6 %                                  



             5905-5)                                             

 

             EOS % (test code = 1.5 %                                  



             713-8)                                              

 

             BASO % (test code = 0.4 %                                  



             706-2)                                              

 

             GRAN MAT x10^3(ANC) 7.43 10*3/uL 1.99-6.95    H            



             (test code =                                        



             4444782016)                                         

 

             IMM GRAN x10^3 (test 0.04 10*3/uL 0.00-0.06                 



             code = 3480774550)                                        

 

             LYMPH x10^3 (test code 2.72 10*3/uL 1.09-3.23                 



             = 731-0)                                            

 

             MONO x10^3 (test code 0.74 10*3/uL 0.36-1.02                 



             = 742-7)                                            

 

             EOS x10^3 (test code = 0.17 10*3/uL 0.06-0.53                 



             711-2)                                              

 

             BASO x10^3 (test code 0.04 10*3/uL 0.01-0.09                 



             = 704-7)                                            

 

             Lab Interpretation Abnormal                               



             (test code = 47729-7)                                        



[facility]. METABOLIC PANEL ([id number])2022 
00:18:07





             Test Item    Value        Reference Range Interpretation Comments

 

             NA (test code = 139 mmol/L   135-145                   



             0029211091)                                         

 

             K (test code = 4.8 mmol/L   3.5-5.0                   



             5981001426)                                         

 

             CL (test code = 104 mmol/L                       



             5967038798)                                         

 

             CO2 TOTAL (test code = 22 mmol/L    23-31        L            



             6146181746)                                         

 

             AGAP (test code =              2-16                      



             3976693911)                                         

 

             BUN (test code = 15 mg/dL     7-23                      



             5068692503)                                         

 

             GLUCOSE (test code = 93 mg/dL                         



             7438722216)                                         

 

             CREATININE (test code = 0.67 mg/dL   0.60-1.25                 



             4213358931)                                         

 

             TOTAL BILI (test code = 0.7 mg/dL    0.1-1.1                   



             2422908091)                                         

 

             CALCIUM (test code = 9.8 mg/dL    8.6-10.6                  



             1338747174)                                         

 

             T PROTEIN (test code = 8.9 g/dL     6.3-8.2      H            



             1562306712)                                         

 

             ALBUMIN (test code = 4.9 g/dL     3.5-5.0                   



             8617975485)                                         

 

             ALK PHOS (test code = 126 U/L             H            



             2570831005)                                         

 

             ALTv (test code = 43 U/L       5-50                      



             1742-6)                                             

 

             AST(SGOT) (test code = 28 U/L       13-40                     



             7454234906)                                         

 

             eGFR (test code =              mL/min/1.73m2              



             8202047602)                                         

 

             MAL (test code = MAL) Association of                           



                          Glomerular Filtration                           



                          Rate (GFR) and Staging                           



                          of Kidney Disease*                           



                          +---------------------                           



                          --+-------------------                           



                          --+-------------------                           



                          ------+| GFR                           



                          (mL/min/1.73 m2) ?|                           



                          With Kidney Damage ?|                           



                          ?Without Kidney                           



                          Damage+---------------                           



                          --------+-------------                           



                          --------+-------------                           



                          ------------+| ?>90 ?                           



                          ? ? ? ? ? ? ? ?|                           



                          ?Stage one ? ? ? ? ?|                           



                          ? Normal ? ? ? ? ? ? ?                           



                          ?+--------------------                           



                          ---+------------------                           



                          ---+------------------                           



                          -------+| ?60-89 ? ? ?                           



                          ? ? ? ? ?| ?Stage two                           



                          ? ? ? ? ?| ? Decreased                           



                          GFR ? ? ? ?                            



                          +---------------------                           



                          --+-------------------                           



                          --+-------------------                           



                          ------+| ?30-59 ? ? ?                           



                          ? ? ? ? ?| ?Stage                           



                          three ? ? ? ?| ? Stage                           



                          three ? ? ? ? ?                           



                          +---------------------                           



                          --+-------------------                           



                          --+-------------------                           



                          ------+| ?15-29 ? ? ?                           



                          ? ? ? ? ?| ?Stage four                           



                          ? ? ? ? | ? Stage four                           



                          ? ? ? ? ?                              



                          ?+--------------------                           



                          ---+------------------                           



                          ---+------------------                           



                          -------+| ?<15 (or                           



                          dialysis) ? ?| ?Stage                           



                          five ? ? ? ? | ? Stage                           



                          five ? ? ? ? ?                           



                          ?+--------------------                           



                          ---+------------------                           



                          ---+------------------                           



                          -------+ *Each stage                           



                          assumes the associated                           



                          GFR level has been in                           



                          effect for at least                           



                          three months. ?Stages                           



                          1 to 5, with or                           



                          without kidney                           



                          disease, indicate                           



                          chronic kidney                           



                          disease. Notes:                           



                          Determination of                           



                          stages one and two                           



                          (with eGFR                             



                          >59mL/min/1.73 m2)                           



                          requires estimation of                           



                          kidney damage for at                           



                          least three months as                           



                          defined by structural                           



                          or functional                           



                          abnormalities of the                           



                          kidney, manifested by                           



                          either:Pathological                           



                          abnormalities or                           



                          Markers of kidney                           



                          damage (including                           



                          abnormalities in the                           



                          composition of the                           



                          blood or urine or                           



                          abnormalities in                           



                          imaging tests).                           

 

             Lab Interpretation Abnormal                               



             (test code = 69741-4)                                        



[facility]. METABOLIC PANEL ([id number])2022 
12:48:40





             Test Item    Value        Reference Range Interpretation Comments

 

             NA (test code = 137 mmol/L   135-145                   



             2278344024)                                         

 

             K (test code = 4.5 mmol/L   3.5-5.0                   



             3476431480)                                         

 

             CL (test code = 107 mmol/L                       



             7687290592)                                         

 

             CO2 TOTAL (test code = 24 mmol/L    23-31                     



             7884737452)                                         

 

             AGAP (test code =              2-16                      



             2378981843)                                         

 

             BUN (test code = 16 mg/dL     7-23                      



             1929380719)                                         

 

             GLUCOSE (test code = 116 mg/dL           H            



             6480001147)                                         

 

             CREATININE (test code = 0.62 mg/dL   0.60-1.25                 



             5176386264)                                         

 

             TOTAL BILI (test code = 0.4 mg/dL    0.1-1.1                   



             7135128136)                                         

 

             CALCIUM (test code = 8.7 mg/dL    8.6-10.6                  



             0021355948)                                         

 

             T PROTEIN (test code = 7.5 g/dL     6.3-8.2                   



             8798418984)                                         

 

             ALBUMIN (test code = 3.9 g/dL     3.5-5.0                   



             7953765529)                                         

 

             ALK PHOS (test code = 93 U/L                           



             6589087213)                                         

 

             ALTv (test code = 40 U/L       5-50                      



             1742-6)                                             

 

             AST(SGOT) (test code = 51 U/L       13-40        H            



             6531726589)                                         

 

             eGFR (test code =              mL/min/1.73m2              



             2601203064)                                         

 

             MAL (test code = MAL) Association of                           



                          Glomerular Filtration                           



                          Rate (GFR) and Staging                           



                          of Kidney Disease*                           



                          +---------------------                           



                          --+-------------------                           



                          --+-------------------                           



                          ------+| GFR                           



                          (mL/min/1.73 m2) ?|                           



                          With Kidney Damage ?|                           



                          ?Without Kidney                           



                          Damage+---------------                           



                          --------+-------------                           



                          --------+-------------                           



                          ------------+| ?>90 ?                           



                          ? ? ? ? ? ? ? ?|                           



                          ?Stage one ? ? ? ? ?|                           



                          ? Normal ? ? ? ? ? ? ?                           



                          ?+--------------------                           



                          ---+------------------                           



                          ---+------------------                           



                          -------+| ?60-89 ? ? ?                           



                          ? ? ? ? ?| ?Stage two                           



                          ? ? ? ? ?| ? Decreased                           



                          GFR ? ? ? ?                            



                          +---------------------                           



                          --+-------------------                           



                          --+-------------------                           



                          ------+| ?30-59 ? ? ?                           



                          ? ? ? ? ?| ?Stage                           



                          three ? ? ? ?| ? Stage                           



                          three ? ? ? ? ?                           



                          +---------------------                           



                          --+-------------------                           



                          --+-------------------                           



                          ------+| ?15-29 ? ? ?                           



                          ? ? ? ? ?| ?Stage four                           



                          ? ? ? ? | ? Stage four                           



                          ? ? ? ? ?                              



                          ?+--------------------                           



                          ---+------------------                           



                          ---+------------------                           



                          -------+| ?<15 (or                           



                          dialysis) ? ?| ?Stage                           



                          five ? ? ? ? | ? Stage                           



                          five ? ? ? ? ?                           



                          ?+--------------------                           



                          ---+------------------                           



                          ---+------------------                           



                          -------+ *Each stage                           



                          assumes the associated                           



                          GFR level has been in                           



                          effect for at least                           



                          three months. ?Stages                           



                          1 to 5, with or                           



                          without kidney                           



                          disease, indicate                           



                          chronic kidney                           



                          disease. Notes:                           



                          Determination of                           



                          stages one and two                           



                          (with eGFR                             



                          >59mL/min/1.73 m2)                           



                          requires estimation of                           



                          kidney damage for at                           



                          least three months as                           



                          defined by structural                           



                          or functional                           



                          abnormalities of the                           



                          kidney, manifested by                           



                          either:Pathological                           



                          abnormalities or                           



                          Markers of kidney                           



                          damage (including                           



                          abnormalities in the                           



                          composition of the                           



                          blood or urine or                           



                          abnormalities in                           



                          imaging tests).                           

 

             Lab Interpretation Abnormal                               



             (test code = 76146-2)                                        



[facility] WITH [id number] 12:21:36





             Test Item    Value        Reference Range Interpretation Comments

 

             WBC (test code =              See_Comment                [Automated



             4592-2)                                             message] The sy

stem



                                                                 which generated



                                                                 this result



                                                                 transmitted



                                                                 reference range

:



                                                                 4.20 - 10.70



                                                                 10*3/?L. The



                                                                 reference range

 was



                                                                 not used to



                                                                 interpret this



                                                                 result as



                                                                 normal/abnormal

.

 

             RBC (test code =              See_Comment                [Automated



             899-8)                                              message] The sy

stem



                                                                 which generated



                                                                 this result



                                                                 transmitted



                                                                 reference range

:



                                                                 4.26 - 5.52



                                                                 10*6/?L. The



                                                                 reference range

 was



                                                                 not used to



                                                                 interpret this



                                                                 result as



                                                                 normal/abnormal

.

 

             HGB (test code = 15.7 g/dL    12.2-16.4                 



             718-7)                                              

 

             HCT (test code = 48.0 %       38.4-49.3                 



             4544-3)                                             

 

             MCV (test code = 90.1 fL      81.7-95.6                 



             787-2)                                              

 

             MCH (test code = 29.5 pg      26.1-32.7                 



             785-6)                                              

 

             MCHC (test code = 32.7 g/dL    31.2-35.0                 



             786-4)                                              

 

             RDW-SD (test code = 50.6 fL      38.5-51.6                 



             24063-0)                                            

 

             RDW-CV (test code = 15.3 %       12.1-15.4                 



             788-0)                                              

 

             PLT (test code =              See_Comment  H             [Automated



             177-3)                                              message] The sy

stem



                                                                 which generated



                                                                 this result



                                                                 transmitted



                                                                 reference range

:



                                                                 150 - 328 10*3/

?L.



                                                                 The reference r

zhen



                                                                 was not used to



                                                                 interpret this



                                                                 result as



                                                                 normal/abnormal

.

 

             MPV (test code = 10.3 fL      9.8-13.0                  



             69369-6)                                            

 

             NRBC/100 WBC (test              See_Comment                [Automat

ed



             code = [id number])                                        message] 

The system



                                                                 which generated



                                                                 this result



                                                                 transmitted



                                                                 reference range

:



                                                                 0.0 - 10.0 /100



                                                                 WBCs. The refer

ence



                                                                 range was not u

sed



                                                                 to interpret th

is



                                                                 result as



                                                                 normal/abnormal

.

 

             NRBC x10^3 (test code <0.01        See_Comment                [Auto

mated



             = [id number])                                        message] The s

ystem



                                                                 which generated



                                                                 this result



                                                                 transmitted



                                                                 reference range

:



                                                                 10*3/?L. The



                                                                 reference range

 was



                                                                 not used to



                                                                 interpret this



                                                                 result as



                                                                 normal/abnormal

.

 

             GRAN MAT (NEUT) % 61.5 %                                 



             (test code = 770-8)                                        

 

             IMM GRAN % (test code 0.80 %                                 



             = [id number])                                        

 

             LYMPH % (test code = 27.8 %                                 



             736-9)                                              

 

             MONO % (test code = 6.9 %                                  



             5905-5)                                             

 

             EOS % (test code = 2.4 %                                  



             713-8)                                              

 

             BASO % (test code = 0.6 %                                  



             706-2)                                              

 

             GRAN MAT x10^3(ANC) 6.07 10*3/uL 1.99-6.95                 



             (test code =                                        



             2635884028)                                         

 

             IMM GRAN x10^3 (test 0.08 10*3/uL 0.00-0.06    H            



             code = 0927989325)                                        

 

             LYMPH x10^3 (test code 2.75 10*3/uL 1.09-3.23                 



             = 731-0)                                            

 

             MONO x10^3 (test code 0.68 10*3/uL 0.36-1.02                 



             = 742-7)                                            

 

             EOS x10^3 (test code = 0.24 10*3/uL 0.06-0.53                 



             711-2)                                              

 

             BASO x10^3 (test code 0.06 10*3/uL 0.01-0.09                 



             = 704-7)                                            

 

             Lab Interpretation Abnormal                               



             (test code = 05351-9)                                        



[facility] Metabolic Panel (NA, K, CL, CO2, 
GLUCOSE, BUN, CREATININE, CA)2022 12:40:30





             Test Item    Value        Reference Range Interpretation Comments

 

             NA (test code = 139 mmol/L   135-145                   



             0735079526)                                         

 

             K (test code = 3.9 mmol/L   3.5-5.0                   



             4611450762)                                         

 

             CL (test code = 108 mmol/L                       



             8432447735)                                         

 

             CO2 TOTAL (test code = 25 mmol/L    23-31                     



             4053080803)                                         

 

             AGAP (test code =              2-16                      



             8877141345)                                         

 

             BUN (test code = 14 mg/dL     7-23                      



             3986220800)                                         

 

             GLUCOSE (test code = 107 mg/dL                        



             3674992639)                                         

 

             CREATININE (test code = 0.61 mg/dL   0.60-1.25                 



             6577216354)                                         

 

             CALCIUM (test code = 8.1 mg/dL    8.6-10.6     L            



             1643087233)                                         

 

             eGFR (test code =              mL/min/1.73m2              



             6351786417)                                         

 

             MAL (test code = MAL) Association of                           



                          Glomerular Filtration                           



                          Rate (GFR) and Staging                           



                          of Kidney Disease*                           



                          +---------------------                           



                          --+-------------------                           



                          --+-------------------                           



                          ------+| GFR                           



                          (mL/min/1.73 m2) ?|                           



                          With Kidney Damage ?|                           



                          ?Without Kidney                           



                          Damage+---------------                           



                          --------+-------------                           



                          --------+-------------                           



                          ------------+| ?>90 ?                           



                          ? ? ? ? ? ? ? ?|                           



                          ?Stage one ? ? ? ? ?|                           



                          ? Normal ? ? ? ? ? ? ?                           



                          ?+--------------------                           



                          ---+------------------                           



                          ---+------------------                           



                          -------+| ?60-89 ? ? ?                           



                          ? ? ? ? ?| ?Stage two                           



                          ? ? ? ? ?| ? Decreased                           



                          GFR ? ? ? ?                            



                          +---------------------                           



                          --+-------------------                           



                          --+-------------------                           



                          ------+| ?30-59 ? ? ?                           



                          ? ? ? ? ?| ?Stage                           



                          three ? ? ? ?| ? Stage                           



                          three ? ? ? ? ?                           



                          +---------------------                           



                          --+-------------------                           



                          --+-------------------                           



                          ------+| ?15-29 ? ? ?                           



                          ? ? ? ? ?| ?Stage four                           



                          ? ? ? ? | ? Stage four                           



                          ? ? ? ? ?                              



                          ?+--------------------                           



                          ---+------------------                           



                          ---+------------------                           



                          -------+| ?<15 (or                           



                          dialysis) ? ?| ?Stage                           



                          five ? ? ? ? | ? Stage                           



                          five ? ? ? ? ?                           



                          ?+--------------------                           



                          ---+------------------                           



                          ---+------------------                           



                          -------+ *Each stage                           



                          assumes the associated                           



                          GFR level has been in                           



                          effect for at least                           



                          three months. ?Stages                           



                          1 to 5, with or                           



                          without kidney                           



                          disease, indicate                           



                          chronic kidney                           



                          disease. Notes:                           



                          Determination of                           



                          stages one and two                           



                          (with eGFR                             



                          >59mL/min/1.73 m2)                           



                          requires estimation of                           



                          kidney damage for at                           



                          least three months as                           



                          defined by structural                           



                          or functional                           



                          abnormalities of the                           



                          kidney, manifested by                           



                          either:Pathological                           



                          abnormalities or                           



                          Markers of kidney                           



                          damage (including                           



                          abnormalities in the                           



                          composition of the                           



                          blood or urine or                           



                          abnormalities in                           



                          imaging tests).                           

 

             Lab Interpretation Abnormal                               



             (test code = 42526-1)                                        



[facility] with [id number] 12:23:51





             Test Item    Value        Reference Range Interpretation Comments

 

             WBC (test code =              See_Comment                [Automated



             6690-2)                                             message] The sy

stem



                                                                 which generated



                                                                 this result



                                                                 transmitted



                                                                 reference range

:



                                                                 4.20 - 10.70



                                                                 10*3/?L. The



                                                                 reference range

 was



                                                                 not used to



                                                                 interpret this



                                                                 result as



                                                                 normal/abnormal

.

 

             RBC (test code =              See_Comment                [Automated



             789-8)                                              message] The sy

stem



                                                                 which generated



                                                                 this result



                                                                 transmitted



                                                                 reference range

:



                                                                 4.26 - 5.52



                                                                 10*6/?L. The



                                                                 reference range

 was



                                                                 not used to



                                                                 interpret this



                                                                 result as



                                                                 normal/abnormal

.

 

             HGB (test code = 14.9 g/dL    12.2-16.4                 



             718-7)                                              

 

             HCT (test code = 44.2 %       38.4-49.3                 



             4544-3)                                             

 

             MCV (test code = 88.4 fL      81.7-95.6                 



             787-2)                                              

 

             MCH (test code = 29.8 pg      26.1-32.7                 



             785-6)                                              

 

             MCHC (test code = 33.7 g/dL    31.2-35.0                 



             786-4)                                              

 

             RDW-SD (test code = 49.3 fL      38.5-51.6                 



             39063-3)                                            

 

             RDW-CV (test code = 15.3 %       12.1-15.4                 



             788-0)                                              

 

             PLT (test code =              See_Comment  H             [Automated



             777-3)                                              message] The sy

stem



                                                                 which generated



                                                                 this result



                                                                 transmitted



                                                                 reference range

:



                                                                 150 - 328 10*3/

?L.



                                                                 The reference r

zhen



                                                                 was not used to



                                                                 interpret this



                                                                 result as



                                                                 normal/abnormal

.

 

             MPV (test code = 10.2 fL      9.8-13.0                  



             64174-6)                                            

 

             NRBC/100 WBC (test              See_Comment                [Automat

ed



             code = 2990543276)                                        message] 

The system



                                                                 which generated



                                                                 this result



                                                                 transmitted



                                                                 reference range

:



                                                                 0.0 - 10.0 /100



                                                                 WBCs. The refer

ence



                                                                 range was not u

sed



                                                                 to interpret th

is



                                                                 result as



                                                                 normal/abnormal

.

 

             NRBC x10^3 (test code <0.01        See_Comment                [Auto

mated



             = 8659022045)                                        message] The s

ystem



                                                                 which generated



                                                                 this result



                                                                 transmitted



                                                                 reference range

:



                                                                 10*3/?L. The



                                                                 reference range

 was



                                                                 not used to



                                                                 interpret this



                                                                 result as



                                                                 normal/abnormal

.

 

             GRAN MAT (NEUT) % 56.7 %                                 



             (test code = 770-8)                                        

 

             IMM GRAN % (test code 0.60 %                                 



             = 0574775749)                                        

 

             LYMPH % (test code = 31.1 %                                 



             736-9)                                              

 

             MONO % (test code = 8.7 %                                  



             5905-5)                                             

 

             EOS % (test code = 2.4 %                                  



             713-8)                                              

 

             BASO % (test code = 0.5 %                                  



             706-2)                                              

 

             GRAN MAT x10^3(ANC) 4.83 10*3/uL 1.99-6.95                 



             (test code =                                        



             8810295833)                                         

 

             IMM GRAN x10^3 (test 0.05 10*3/uL 0.00-0.06                 



             code = 4095741887)                                        

 

             LYMPH x10^3 (test code 2.64 10*3/uL 1.09-3.23                 



             = 731-0)                                            

 

             MONO x10^3 (test code 0.74 10*3/uL 0.36-1.02                 



             = 742-7)                                            

 

             EOS x10^3 (test code = 0.20 10*3/uL 0.06-0.53                 



             711-2)                                              

 

             BASO x10^3 (test code 0.04 10*3/uL 0.01-0.09                 



             = 704-7)                                            

 

             Lab Interpretation Abnormal                               



             (test code = 51481-0)                                        



[facility]. METABOLIC PANEL ([id number] 
06:32:14





             Test Item    Value        Reference Range Interpretation Comments

 

             NA (test code = 139 mmol/L   135-145                   



             2098155281)                                         

 

             K (test code = 4.5 mmol/L   3.5-5.0                   



             9480747544)                                         

 

             CL (test code = 102 mmol/L                       



             6496334342)                                         

 

             CO2 TOTAL (test code = 26 mmol/L    23-31                     



             0807949384)                                         

 

             AGAP (test code =              2-16                      



             4211874354)                                         

 

             BUN (test code = 16 mg/dL     7-23                      



             5607368656)                                         

 

             GLUCOSE (test code = 99 mg/dL                         



             3213853880)                                         

 

             CREATININE (test code = 0.83 mg/dL   0.60-1.25                 



             0729127711)                                         

 

             TOTAL BILI (test code = 0.6 mg/dL    0.1-1.1                   



             3234315587)                                         

 

             CALCIUM (test code = 9.5 mg/dL    8.6-10.6                  



             0142161034)                                         

 

             T PROTEIN (test code = 8.6 g/dL     6.3-8.2      H            



             2163431458)                                         

 

             ALBUMIN (test code = 4.6 g/dL     3.5-5.0                   



             6878419178)                                         

 

             ALK PHOS (test code = 121 U/L                          



             8656017030)                                         

 

             ALTv (test code = 58 U/L       5-50         H            



             1742-6)                                             

 

             AST(SGOT) (test code = 32 U/L       13-40                     



             3966913249)                                         

 

             eGFR (test code =              mL/min/1.73m2              



             7827416309)                                         

 

             MAL (test code = MAL) Association of                           



                          Glomerular Filtration                           



                          Rate (GFR) and Staging                           



                          of Kidney Disease*                           



                          +---------------------                           



                          --+-------------------                           



                          --+-------------------                           



                          ------+| GFR                           



                          (mL/min/1.73 m2) ?|                           



                          With Kidney Damage ?|                           



                          ?Without Kidney                           



                          Damage+---------------                           



                          --------+-------------                           



                          --------+-------------                           



                          ------------+| ?>90 ?                           



                          ? ? ? ? ? ? ? ?|                           



                          ?Stage one ? ? ? ? ?|                           



                          ? Normal ? ? ? ? ? ? ?                           



                          ?+--------------------                           



                          ---+------------------                           



                          ---+------------------                           



                          -------+| ?60-89 ? ? ?                           



                          ? ? ? ? ?| ?Stage two                           



                          ? ? ? ? ?| ? Decreased                           



                          GFR ? ? ? ?                            



                          +---------------------                           



                          --+-------------------                           



                          --+-------------------                           



                          ------+| ?30-59 ? ? ?                           



                          ? ? ? ? ?| ?Stage                           



                          three ? ? ? ?| ? Stage                           



                          three ? ? ? ? ?                           



                          +---------------------                           



                          --+-------------------                           



                          --+-------------------                           



                          ------+| ?15-29 ? ? ?                           



                          ? ? ? ? ?| ?Stage four                           



                          ? ? ? ? | ? Stage four                           



                          ? ? ? ? ?                              



                          ?+--------------------                           



                          ---+------------------                           



                          ---+------------------                           



                          -------+| ?<15 (or                           



                          dialysis) ? ?| ?Stage                           



                          five ? ? ? ? | ? Stage                           



                          five ? ? ? ? ?                           



                          ?+--------------------                           



                          ---+------------------                           



                          ---+------------------                           



                          -------+ *Each stage                           



                          assumes the associated                           



                          GFR level has been in                           



                          effect for at least                           



                          three months. ?Stages                           



                          1 to 5, with or                           



                          without kidney                           



                          disease, indicate                           



                          chronic kidney                           



                          disease. Notes:                           



                          Determination of                           



                          stages one and two                           



                          (with eGFR                             



                          >59mL/min/1.73 m2)                           



                          requires estimation of                           



                          kidney damage for at                           



                          least three months as                           



                          defined by structural                           



                          or functional                           



                          abnormalities of the                           



                          kidney, manifested by                           



                          either:Pathological                           



                          abnormalities or                           



                          Markers of kidney                           



                          damage (including                           



                          abnormalities in the                           



                          composition of the                           



                          blood or urine or                           



                          abnormalities in                           



                          imaging tests).                           

 

             Lab Interpretation Abnormal                               



             (test code = 06764-5)                                        



[facility] WITH [id number] 05:48:31





             Test Item    Value        Reference Range Interpretation Comments

 

             WBC (test code =              See_Comment  H             [Automated



             6690-2)                                             message] The sy

stem



                                                                 which generated



                                                                 this result



                                                                 transmitted



                                                                 reference range

:



                                                                 4.20 - 10.70



                                                                 10*3/?L. The



                                                                 reference range

 was



                                                                 not used to



                                                                 interpret this



                                                                 result as



                                                                 normal/abnormal

.

 

             RBC (test code =              See_Comment  H             [Automated



             789-8)                                              message] The sy

stem



                                                                 which generated



                                                                 this result



                                                                 transmitted



                                                                 reference range

:



                                                                 4.26 - 5.52



                                                                 10*6/?L. The



                                                                 reference range

 was



                                                                 not used to



                                                                 interpret this



                                                                 result as



                                                                 normal/abnormal

.

 

             HGB (test code = 17.3 g/dL    12.2-16.4    H            



             718-7)                                              

 

             HCT (test code = 51.4 %       38.4-49.3    H            



             4544-3)                                             

 

             MCV (test code = 87.7 fL      81.7-95.6                 



             787-2)                                              

 

             MCH (test code = 29.5 pg      26.1-32.7                 



             785-6)                                              

 

             MCHC (test code = 33.7 g/dL    31.2-35.0                 



             786-4)                                              

 

             RDW-SD (test code = 49.1 fL      38.5-51.6                 



             40256-3)                                            

 

             RDW-CV (test code = 15.5 %       12.1-15.4    H            



             788-0)                                              

 

             PLT (test code =              See_Comment  H             [Automated



             777-3)                                              message] The sy

stem



                                                                 which generated



                                                                 this result



                                                                 transmitted



                                                                 reference range

:



                                                                 150 - 328 10*3/

?L.



                                                                 The reference r

zhen



                                                                 was not used to



                                                                 interpret this



                                                                 result as



                                                                 normal/abnormal

.

 

             MPV (test code = 10.4 fL      9.8-13.0                  



             70416-5)                                            

 

             NRBC/100 WBC (test              See_Comment                [Automat

ed



             code = 6034887038)                                        message] 

The system



                                                                 which generated



                                                                 this result



                                                                 transmitted



                                                                 reference range

:



                                                                 0.0 - 10.0 /100



                                                                 WBCs. The refer

ence



                                                                 range was not u

sed



                                                                 to interpret th

is



                                                                 result as



                                                                 normal/abnormal

.

 

             NRBC x10^3 (test code <0.01        See_Comment                [Auto

mated



             = 2649522587)                                        message] The s

ystem



                                                                 which generated



                                                                 this result



                                                                 transmitted



                                                                 reference range

:



                                                                 10*3/?L. The



                                                                 reference range

 was



                                                                 not used to



                                                                 interpret this



                                                                 result as



                                                                 normal/abnormal

.

 

             GRAN MAT (NEUT) % 64.8 %                                 



             (test code = 770-8)                                        

 

             IMM GRAN % (test code 0.70 %                                 



             = 7276836222)                                        

 

             LYMPH % (test code = 25.2 %                                 



             736-9)                                              

 

             MONO % (test code = 6.9 %                                  



             5905-5)                                             

 

             EOS % (test code = 1.9 %                                  



             713-8)                                              

 

             BASO % (test code = 0.5 %                                  



             706-2)                                              

 

             GRAN MAT x10^3(ANC) 7.80 10*3/uL 1.99-6.95    H            



             (test code =                                        



             4441565053)                                         

 

             IMM GRAN x10^3 (test 0.08 10*3/uL 0.00-0.06    H            



             code = 3215160744)                                        

 

             LYMPH x10^3 (test code 3.04 10*3/uL 1.09-3.23                 



             = 731-0)                                            

 

             MONO x10^3 (test code 0.83 10*3/uL 0.36-1.02                 



             = 742-7)                                            

 

             EOS x10^3 (test code = 0.23 10*3/uL 0.06-0.53                 



             711-2)                                              

 

             BASO x10^3 (test code 0.06 10*3/uL 0.01-0.09                 



             = 704-7)                                            

 

             Lab Interpretation Abnormal                               



             (test code = 16702-5)                                        



[id number] 
[facility]08





             Test Item    Value        Reference Range Interpretation Comments

 

             NA (test code = 136 mmol/L   135-145                   



             4620873625)                                         

 

             K (test code = 4.4 mmol/L   3.5-5.0                   



             6884532877)                                         

 

             CL (test code = 99 mmol/L                        



             9112346902)                                         

 

             CO2 TOTAL (test code = 25 mmol/L    23-31                     



             9289280199)                                         

 

             AGAP (test code =              2-16                      



             5692806756)                                         

 

             BUN (test code = 19 mg/dL     7-23                      



             4550691165)                                         

 

             GLUCOSE (test code = 103 mg/dL                        



             8650891944)                                         

 

             CREATININE (test code = 0.70 mg/dL   0.60-1.25                 



             7171739973)                                         

 

             TOTAL BILI (test code = 0.7 mg/dL    0.1-1.1                   



             2327731100)                                         

 

             CALCIUM (test code = 9.2 mg/dL    8.6-10.6                  



             8056399419)                                         

 

             T PROTEIN (test code = 9.4 g/dL     6.3-8.2      H            



             3657006335)                                         

 

             ALBUMIN (test code = 4.8 g/dL     3.5-5.0                   



             4789091946)                                         

 

             ALK PHOS (test code = 146 U/L             H            



             8454453374)                                         

 

             ALTv (test code = 75 U/L       5-50         H            



             2-6)                                             

 

             AST(SGOT) (test code = 37 U/L       13-40                     



             9518785744)                                         

 

             eGFR (test code =              mL/min/1.73m2              



             5663800601)                                         

 

             MAL (test code = MAL) Association of                           



                          Glomerular Filtration                           



                          Rate (GFR) and Staging                           



                          of Kidney Disease*                           



                          +---------------------                           



                          --+-------------------                           



                          --+-------------------                           



                          ------+| GFR                           



                          (mL/min/1.73 m2) ?|                           



                          With Kidney Damage ?|                           



                          ?Without Kidney                           



                          Damage+---------------                           



                          --------+-------------                           



                          --------+-------------                           



                          ------------+| ?>90 ?                           



                          ? ? ? ? ? ? ? ?|                           



                          ?Stage one ? ? ? ? ?|                           



                          ? Normal ? ? ? ? ? ? ?                           



                          ?+--------------------                           



                          ---+------------------                           



                          ---+------------------                           



                          -------+| ?60-89 ? ? ?                           



                          ? ? ? ? ?| ?Stage two                           



                          ? ? ? ? ?| ? Decreased                           



                          GFR ? ? ? ?                            



                          +---------------------                           



                          --+-------------------                           



                          --+-------------------                           



                          ------+| ?30-59 ? ? ?                           



                          ? ? ? ? ?| ?Stage                           



                          three ? ? ? ?| ? Stage                           



                          three ? ? ? ? ?                           



                          +---------------------                           



                          --+-------------------                           



                          --+-------------------                           



                          ------+| ?15-29 ? ? ?                           



                          ? ? ? ? ?| ?Stage four                           



                          ? ? ? ? | ? Stage four                           



                          ? ? ? ? ?                              



                          ?+--------------------                           



                          ---+------------------                           



                          ---+------------------                           



                          -------+| ?<15 (or                           



                          dialysis) ? ?| ?Stage                           



                          five ? ? ? ? | ? Stage                           



                          five ? ? ? ? ?                           



                          ?+--------------------                           



                          ---+------------------                           



                          ---+------------------                           



                          -------+ *Each stage                           



                          assumes the associated                           



                          GFR level has been in                           



                          effect for at least                           



                          three months. ?Stages                           



                          1 to 5, with or                           



                          without kidney                           



                          disease, indicate                           



                          chronic kidney                           



                          disease. Notes:                           



                          Determination of                           



                          stages one and two                           



                          (with eGFR                             



                          >59mL/min/1.73 m2)                           



                          requires estimation of                           



                          kidney damage for at                           



                          least three months as                           



                          defined by structural                           



                          or functional                           



                          abnormalities of the                           



                          kidney, manifested by                           



                          either:Pathological                           



                          abnormalities or                           



                          Markers of kidney                           



                          damage (including                           



                          abnormalities in the                           



                          composition of the                           



                          blood or urine or                           



                          abnormalities in                           



                          imaging tests).                           

 

             Lab Interpretation Abnormal                               



             (test code = 80874-7)                                        



[facility]





             Test Item    Value        Reference Range Interpretation Comments

 

             LIPASE (test code = 6153666062) 86 U/L       0-220                 

    

 

             Lab Interpretation (test code = Normal                             

    



             11754-2)                                            



[id number]55[facility]49





             Test Item    Value        Reference Range Interpretation Comments

 

             WBC (test code =              See_Comment  H             [Automated



             6690-2)                                             message] The sy

stem



                                                                 which generated



                                                                 this result



                                                                 transmitted



                                                                 reference range

:



                                                                 4.20 - 10.70



                                                                 10*3/?L. The



                                                                 reference range

 was



                                                                 not used to



                                                                 interpret this



                                                                 result as



                                                                 normal/abnormal

.

 

             RBC (test code =              See_Comment  H             [Automated



             789-8)                                              message] The sy

stem



                                                                 which generated



                                                                 this result



                                                                 transmitted



                                                                 reference range

:



                                                                 4.26 - 5.52



                                                                 10*6/?L. The



                                                                 reference range

 was



                                                                 not used to



                                                                 interpret this



                                                                 result as



                                                                 normal/abnormal

.

 

             HGB (test code = 18.0 g/dL    12.2-16.4    H            



             718-7)                                              

 

             HCT (test code = 53.9 %       38.4-49.3    H            



             4544-3)                                             

 

             MCV (test code = 87.2 fL      81.7-95.6                 



             787-2)                                              

 

             MCH (test code = 29.1 pg      26.1-32.7                 



             785-6)                                              

 

             MCHC (test code = 33.4 g/dL    31.2-35.0                 



             786-4)                                              

 

             RDW-SD (test code = 48.7 fL      38.5-51.6                 



             12739-8)                                            

 

             RDW-CV (test code = 15.4 %       12.1-15.4                 



             788-0)                                              

 

             PLT (test code =              See_Comment  H             [Automated



             777-3)                                              message] The sy

stem



                                                                 which generated



                                                                 this result



                                                                 transmitted



                                                                 reference range

:



                                                                 150 - 328 10*3/

?L.



                                                                 The reference r

zhen



                                                                 was not used to



                                                                 interpret this



                                                                 result as



                                                                 normal/abnormal

.

 

             MPV (test code = 9.9 fL       9.8-13.0                  



             19912-0)                                            

 

             NRBC/100 WBC (test              See_Comment                [Automat

ed



             code = 6751619860)                                        message] 

The system



                                                                 which generated



                                                                 this result



                                                                 transmitted



                                                                 reference range

:



                                                                 0.0 - 10.0 /100



                                                                 WBCs. The refer

ence



                                                                 range was not u

sed



                                                                 to interpret th

is



                                                                 result as



                                                                 normal/abnormal

.

 

             NRBC x10^3 (test code <0.01        See_Comment                [Auto

mated



             = 1420737117)                                        message] The s

ystem



                                                                 which generated



                                                                 this result



                                                                 transmitted



                                                                 reference range

:



                                                                 10*3/?L. The



                                                                 reference range

 was



                                                                 not used to



                                                                 interpret this



                                                                 result as



                                                                 normal/abnormal

.

 

             GRAN MAT (NEUT) % 69.8 %                                 



             (test code = 770-8)                                        

 

             IMM GRAN % (test code 0.50 %                                 



             = 9668752046)                                        

 

             LYMPH % (test code = 20.5 %                                 



             736-9)                                              

 

             MONO % (test code = 6.8 %                                  



             5905-5)                                             

 

             EOS % (test code = 1.9 %                                  



             713-8)                                              

 

             BASO % (test code = 0.5 %                                  



             706-2)                                              

 

             GRAN MAT x10^3(ANC) 7.72 10*3/uL 1.99-6.95    H            



             (test code =                                        



             8200669843)                                         

 

             IMM GRAN x10^3 (test 0.05 10*3/uL 0.00-0.06                 



             code = 0333736161)                                        

 

             LYMPH x10^3 (test code 2.26 10*3/uL 1.09-3.23                 



             = 731-0)                                            

 

             MONO x10^3 (test code 0.75 10*3/uL 0.36-1.02                 



             = 742-7)                                            

 

             EOS x10^3 (test code = 0.21 10*3/uL 0.06-0.53                 



             711-2)                                              

 

             BASO x10^3 (test code 0.06 10*3/uL 0.01-0.09                 



             = 704-7)                                            

 

             Lab Interpretation Abnormal                               



             (test code = 47466-4)                                        



[id number]52





             Test Item    Value        Reference    Interpretation Comments



                                       Range                     

 

             D-DIMER (test code =              See_Comment                [Autom

ated



             8309470013)                                         message] The



                                                                 system which



                                                                 generated this



                                                                 result



                                                                 transmitted



                                                                 reference range

:



                                                                 <0.41 ?g/mL



                                                                 (FEU). The



                                                                 reference range



                                                                 was not used to



                                                                 interpret this



                                                                 result as



                                                                 normal/abnormal

.

 

             MAL (test code = This test may be                           



             MAL)         used in conjunction                           



                          with a clinical                           



                          pretest probability                           



                          (PTP) assessment                           



                          model to exclude                           



                          venous                                 



                          thromboembolism                           



                          (VTE) in patients                           



                          suspected of deep                           



                          venous thrombosis                           



                          (DVT) and pulmonary                           



                          embolism (PE) A                           



                          D-Dimer value less                           



                          than 0.50 ?g/ml                           



                          (FEU) has a negative                           



                          predicative value of                           



                          96 to 100% (95%                           



                          CI)and 97 to 100%                           



                          (95% CI) as an aid                           



                          in the diagnosis of                           



                          deep vein thrombosis                           



                          (DVT) and pulmonary                           



                          embolism when there                           



                          is low or moderate                           



                          pretest probability                           



                          of PE or DVT.                           



                          D-Dimer values are                           



                          expressed in initial                           



                          fibrinogen                             



                          equivalent units                           



                          (FEU)" The assay                           



                          results should be                           



                          used with other                           



                          information,                           



                          including the                           



                          clinical context, in                           



                          forming a diagnosis.                           

 

             Lab Interpretation Normal                                 



             (test code =                                        



             66288-3)                                            



[facility]. METABOLIC PANEL ([id number])2022-01-15 
22:42:48





             Test Item    Value        Reference Range Interpretation Comments

 

             NA (test code = 135 mmol/L   135-145                   



             2138401689)                                         

 

             K (test code = 4.6 mmol/L   3.5-5.0                   



             2580244688)                                         

 

             CL (test code = 102 mmol/L                       



             3929086493)                                         

 

             CO2 TOTAL (test code = 24 mmol/L    23-31                     



             4886760937)                                         

 

             AGAP (test code =              2-16                      



             2871922275)                                         

 

             BUN (test code = 17 mg/dL     7-23                      



             3251496689)                                         

 

             GLUCOSE (test code = 107 mg/dL                        



             8955983576)                                         

 

             CREATININE (test code = 0.60 mg/dL   0.60-1.25                 



             3235085119)                                         

 

             TOTAL BILI (test code = 1.0 mg/dL    0.1-1.1                   



             8695490993)                                         

 

             CALCIUM (test code = 9.4 mg/dL    8.6-10.6                  



             8000440496)                                         

 

             T PROTEIN (test code = 8.6 g/dL     6.3-8.2      H            



             2449545580)                                         

 

             ALBUMIN (test code = 4.6 g/dL     3.5-5.0                   



             8900435530)                                         

 

             ALK PHOS (test code = 159 U/L             H            



             5972135287)                                         

 

             ALTv (test code = 77 U/L       5-50         H            



             1742-6)                                             

 

             AST(SGOT) (test code = 38 U/L       13-40                     



             1167988285)                                         

 

             eGFR (test code =              mL/min/1.73m2              



             6568210122)                                         

 

             MAL (test code = MAL) Association of                           



                          Glomerular Filtration                           



                          Rate (GFR) and Staging                           



                          of Kidney Disease*                           



                          +---------------------                           



                          --+-------------------                           



                          --+-------------------                           



                          ------+| GFR                           



                          (mL/min/1.73 m2) ?|                           



                          With Kidney Damage ?|                           



                          ?Without Kidney                           



                          Damage+---------------                           



                          --------+-------------                           



                          --------+-------------                           



                          ------------+| ?>90 ?                           



                          ? ? ? ? ? ? ? ?|                           



                          ?Stage one ? ? ? ? ?|                           



                          ? Normal ? ? ? ? ? ? ?                           



                          ?+--------------------                           



                          ---+------------------                           



                          ---+------------------                           



                          -------+| ?60-89 ? ? ?                           



                          ? ? ? ? ?| ?Stage two                           



                          ? ? ? ? ?| ? Decreased                           



                          GFR ? ? ? ?                            



                          +---------------------                           



                          --+-------------------                           



                          --+-------------------                           



                          ------+| ?30-59 ? ? ?                           



                          ? ? ? ? ?| ?Stage                           



                          three ? ? ? ?| ? Stage                           



                          three ? ? ? ? ?                           



                          +---------------------                           



                          --+-------------------                           



                          --+-------------------                           



                          ------+| ?15-29 ? ? ?                           



                          ? ? ? ? ?| ?Stage four                           



                          ? ? ? ? | ? Stage four                           



                          ? ? ? ? ?                              



                          ?+--------------------                           



                          ---+------------------                           



                          ---+------------------                           



                          -------+| ?<15 (or                           



                          dialysis) ? ?| ?Stage                           



                          five ? ? ? ? | ? Stage                           



                          five ? ? ? ? ?                           



                          ?+--------------------                           



                          ---+------------------                           



                          ---+------------------                           



                          -------+ *Each stage                           



                          assumes the associated                           



                          GFR level has been in                           



                          effect for at least                           



                          three months. ?Stages                           



                          1 to 5, with or                           



                          without kidney                           



                          disease, indicate                           



                          chronic kidney                           



                          disease. Notes:                           



                          Determination of                           



                          stages one and two                           



                          (with eGFR                             



                          >59mL/min/1.73 m2)                           



                          requires estimation of                           



                          kidney damage for at                           



                          least three months as                           



                          defined by structural                           



                          or functional                           



                          abnormalities of the                           



                          kidney, manifested by                           



                          either:Pathological                           



                          abnormalities or                           



                          Markers of kidney                           



                          damage (including                           



                          abnormalities in the                           



                          composition of the                           



                          blood or urine or                           



                          abnormalities in                           



                          imaging tests).                           

 

             Lab Interpretation Abnormal                               



             (test code = 13327-1)                                        



[facility] WITH [id number] 22:30:27





             Test Item    Value        Reference Range Interpretation Comments

 

             WBC (test code =              See_Comment  H             [Automated



             6690-2)                                             message] The



                                                                 system which



                                                                 generated this



                                                                 result transmit

huma



                                                                 reference range

:



                                                                 4.20 - 10.70



                                                                 10*3/?L. The



                                                                 reference range



                                                                 was not used to



                                                                 interpret this



                                                                 result as



                                                                 normal/abnormal

.

 

             RBC (test code =              See_Comment  H             [Automated



             789-8)                                              message] The



                                                                 system which



                                                                 generated this



                                                                 result transmit

huma



                                                                 reference range

:



                                                                 4.26 - 5.52



                                                                 10*6/?L. The



                                                                 reference range



                                                                 was not used to



                                                                 interpret this



                                                                 result as



                                                                 normal/abnormal

.

 

             HGB (test code = 17.5 g/dL    12.2-16.4    H            



             718-7)                                              

 

             HCT (test code = 51.0 %       38.4-49.3    H            



             4544-3)                                             

 

             MCV (test code = 86.7 fL      81.7-95.6                 



             787-2)                                              

 

             MCH (test code = 29.8 pg      26.1-32.7                 



             785-6)                                              

 

             MCHC (test code = 34.3 g/dL    31.2-35.0                 



             786-4)                                              

 

             RDW-SD (test code = 48.0 fL      38.5-51.6                 



             69947-8)                                            

 

             RDW-CV (test code = 15.1 %       12.1-15.4                 



             788-0)                                              

 

             PLT (test code =              See_Comment  H             [Automated



             777-3)                                              message] The



                                                                 system which



                                                                 generated this



                                                                 result transmit

huma



                                                                 reference range

:



                                                                 150 - 328 10*3/

?L.



                                                                 The reference



                                                                 range was not u

sed



                                                                 to interpret th

is



                                                                 result as



                                                                 normal/abnormal

.

 

             MPV (test code = 10.3 fL      9.8-13.0                  



             83589-9)                                            

 

             NRBC/100 WBC (test              See_Comment                [Automat

ed



             code = 7010178268)                                        message] 

The



                                                                 system which



                                                                 generated this



                                                                 result transmit

huma



                                                                 reference range

:



                                                                 0.0 - 10.0 /100



                                                                 WBCs. The



                                                                 reference range



                                                                 was not used to



                                                                 interpret this



                                                                 result as



                                                                 normal/abnormal

.

 

             NRBC x10^3 (test code <0.01        See_Comment                [Auto

mated



             = [id number])                                        message] The



                                                                 system which



                                                                 generated this



                                                                 result transmit

huma



                                                                 reference range

:



                                                                 10*3/?L. The



                                                                 reference range



                                                                 was not used to



                                                                 interpret this



                                                                 result as



                                                                 normal/abnormal

.

 

             GRAN MAT (NEUT) % 79.9 %                                 



             (test code = 770-8)                                        

 

             IMM GRAN % (test code 0.50 %                                 



             = 9696324904)                                        

 

             LYMPH % (test code = 11.8 %                                 



             736-9)                                              

 

             MONO % (test code = 6.6 %                                  



             5905-5)                                             

 

             EOS % (test code = 0.9 %                                  



             713-8)                                              

 

             BASO % (test code = 0.3 %                                  



             706-2)                                              

 

             GRAN MAT x10^3(ANC) 10.70 10*3/uL 1.99-6.95    H            



             (test code =                                        



             5977894996)                                         

 

             IMM GRAN x10^3 (test 0.07 10*3/uL 0.00-0.06    H            



             code = 0032868140)                                        

 

             LYMPH x10^3 (test code 1.58 10*3/uL 1.09-3.23                 



             = 731-0)                                            

 

             MONO x10^3 (test code 0.89 10*3/uL 0.36-1.02                 



             = 742-7)                                            

 

             EOS x10^3 (test code = 0.12 10*3/uL 0.06-0.53                 



             711-2)                                              

 

             BASO x10^3 (test code 0.04 10*3/uL 0.01-0.09                 



             = 704-7)                                            

 

             Lab Interpretation Abnormal                               



             (test code = 77557-4)                                        



[id number]





             Test Item    Value        Reference Range Interpretation Comments

 

             NA (test code = 137 mmol/L   135-145                   



             0189759624)                                         

 

             K (test code = 4.5 mmol/L   3.5-5.0                   



             2409041226)                                         

 

             CL (test code = 109 mmol/L          H            



             2788078041)                                         

 

             CO2 TOTAL (test code = 22 mmol/L    23-31        L            



             0331749212)                                         

 

             AGAP (test code =              2-16                      



             2129312185)                                         

 

             BUN (test code = 7 mg/dL      7-23                      



             7185735051)                                         

 

             GLUCOSE (test code = 87 mg/dL                         



             7585475410)                                         

 

             CREATININE (test code = 0.61 mg/dL   0.60-1.25                 



             1212756130)                                         

 

             TOTAL BILI (test code = 0.5 mg/dL    0.1-1.1                   



             0570697978)                                         

 

             CALCIUM (test code = 9.0 mg/dL    8.6-10.6                  



             7371605415)                                         

 

             T PROTEIN (test code = 6.9 g/dL     6.3-8.2                   



             0956027801)                                         

 

             ALBUMIN (test code = 3.5 g/dL     3.5-5.0                   



             7568555057)                                         

 

             ALK PHOS (test code = 112 U/L                          



             7295989049)                                         

 

             ALTv (test code = 35 U/L       5-50                      



             1742-6)                                             

 

             AST(SGOT) (test code = 21 U/L       13-40                     



             3465815200)                                         

 

             eGFR (test code =              mL/min/1.73m2              



             0822159312)                                         

 

             MAL (test code = MAL) Association of                           



                          Glomerular Filtration                           



                          Rate (GFR) and Staging                           



                          of Kidney Disease*                           



                          +---------------------                           



                          --+-------------------                           



                          --+-------------------                           



                          ------+| GFR                           



                          (mL/min/1.73 m2) ?|                           



                          With Kidney Damage ?|                           



                          ?Without Kidney                           



                          Damage+---------------                           



                          --------+-------------                           



                          --------+-------------                           



                          ------------+| ?>90 ?                           



                          ? ? ? ? ? ? ? ?|                           



                          ?Stage one ? ? ? ? ?|                           



                          ? Normal ? ? ? ? ? ? ?                           



                          ?+--------------------                           



                          ---+------------------                           



                          ---+------------------                           



                          -------+| ?60-89 ? ? ?                           



                          ? ? ? ? ?| ?Stage two                           



                          ? ? ? ? ?| ? Decreased                           



                          GFR ? ? ? ?                            



                          +---------------------                           



                          --+-------------------                           



                          --+-------------------                           



                          ------+| ?30-59 ? ? ?                           



                          ? ? ? ? ?| ?Stage                           



                          three ? ? ? ?| ? Stage                           



                          three ? ? ? ? ?                           



                          +---------------------                           



                          --+-------------------                           



                          --+-------------------                           



                          ------+| ?15-29 ? ? ?                           



                          ? ? ? ? ?| ?Stage four                           



                          ? ? ? ? | ? Stage four                           



                          ? ? ? ? ?                              



                          ?+--------------------                           



                          ---+------------------                           



                          ---+------------------                           



                          -------+| ?<15 (or                           



                          dialysis) ? ?| ?Stage                           



                          five ? ? ? ? | ? Stage                           



                          five ? ? ? ? ?                           



                          ?+--------------------                           



                          ---+------------------                           



                          ---+------------------                           



                          -------+ *Each stage                           



                          assumes the associated                           



                          GFR level has been in                           



                          effect for at least                           



                          three months. ?Stages                           



                          1 to 5, with or                           



                          without kidney                           



                          disease, indicate                           



                          chronic kidney                           



                          disease. Notes:                           



                          Determination of                           



                          stages one and two                           



                          (with eGFR                             



                          >59mL/min/1.73 m2)                           



                          requires estimation of                           



                          kidney damage for at                           



                          least three months as                           



                          defined by structural                           



                          or functional                           



                          abnormalities of the                           



                          kidney, manifested by                           



                          either:Pathological                           



                          abnormalities or                           



                          Markers of kidney                           



                          damage (including                           



                          abnormalities in the                           



                          composition of the                           



                          blood or urine or                           



                          abnormalities in                           



                          imaging tests).                           

 

             Lab Interpretation Abnormal                               



             (test code = 22725-8)                                        



[facility] WITH [id number] 22:51:57





             Test Item    Value        Reference Range Interpretation Comments

 

             WBC (test code =              See_Comment  H             [Automated



             5890-2)                                             message] The sy

stem



                                                                 which generated



                                                                 this result



                                                                 transmitted



                                                                 reference range

:



                                                                 4.20 - 10.70



                                                                 10*3/?L. The



                                                                 reference range

 was



                                                                 not used to



                                                                 interpret this



                                                                 result as



                                                                 normal/abnormal

.

 

             RBC (test code =              See_Comment                [Automated



             989-8)                                              message] The sy

stem



                                                                 which generated



                                                                 this result



                                                                 transmitted



                                                                 reference range

:



                                                                 4.26 - 5.52



                                                                 10*6/?L. The



                                                                 reference range

 was



                                                                 not used to



                                                                 interpret this



                                                                 result as



                                                                 normal/abnormal

.

 

             HGB (test code = 14.7 g/dL    12.2-16.4                 



             718-7)                                              

 

             HCT (test code = 43.7 %       38.4-49.3                 



             4544-3)                                             

 

             MCV (test code = 85.9 fL      81.7-95.6                 



             787-2)                                              

 

             MCH (test code = 28.9 pg      26.1-32.7                 



             785-6)                                              

 

             MCHC (test code = 33.6 g/dL    31.2-35.0                 



             786-4)                                              

 

             RDW-SD (test code = 42.4 fL      38.5-51.6                 



             73486-7)                                            

 

             RDW-CV (test code = 13.6 %       12.1-15.4                 



             788-0)                                              

 

             PLT (test code =              See_Comment  H             [Automated



             777-3)                                              message] The sy

stem



                                                                 which generated



                                                                 this result



                                                                 transmitted



                                                                 reference range

:



                                                                 150 - 328 10*3/

?L.



                                                                 The reference r

zhen



                                                                 was not used to



                                                                 interpret this



                                                                 result as



                                                                 normal/abnormal

.

 

             MPV (test code = 9.4 fL       9.8-13.0     L            



             75941-5)                                            

 

             NRBC/100 WBC (test              See_Comment                [Automat

ed



             code = 3298579383)                                        message] 

The system



                                                                 which generated



                                                                 this result



                                                                 transmitted



                                                                 reference range

:



                                                                 0.0 - 10.0 /100



                                                                 WBCs. The refer

ence



                                                                 range was not u

sed



                                                                 to interpret th

is



                                                                 result as



                                                                 normal/abnormal

.

 

             NRBC x10^3 (test code <0.01        See_Comment                [Auto

mated



             = [id number])                                        message] The s

ystem



                                                                 which generated



                                                                 this result



                                                                 transmitted



                                                                 reference range

:



                                                                 10*3/?L. The



                                                                 reference range

 was



                                                                 not used to



                                                                 interpret this



                                                                 result as



                                                                 normal/abnormal

.

 

             GRAN MAT (NEUT) % 76.4 %                                 



             (test code = 770-8)                                        

 

             IMM GRAN % (test code 0.40 %                                 



             = 7530437580)                                        

 

             LYMPH % (test code = 16.3 %                                 



             736-9)                                              

 

             MONO % (test code = 5.6 %                                  



             5905-5)                                             

 

             EOS % (test code = 1.0 %                                  



             713-8)                                              

 

             BASO % (test code = 0.3 %                                  



             706-2)                                              

 

             GRAN MAT x10^3(ANC) 8.81 10*3/uL 1.99-6.95    H            



             (test code =                                        



             1951466517)                                         

 

             IMM GRAN x10^3 (test 0.05 10*3/uL 0.00-0.06                 



             code = 0750682448)                                        

 

             LYMPH x10^3 (test code 1.88 10*3/uL 1.09-3.23                 



             = 731-0)                                            

 

             MONO x10^3 (test code 0.64 10*3/uL 0.36-1.02                 



             = 742-7)                                            

 

             EOS x10^3 (test code = 0.12 10*3/uL 0.06-0.53                 



             711-2)                                              

 

             BASO x10^3 (test code 0.03 10*3/uL 0.01-0.09                 



             = 704-7)                                            

 

             Lab Interpretation Abnormal                               



             (test code = 71783-1)                                        



[facility] 
[facility]:33





             Test Item    Value        Reference Range Interpretation Comments

 

             SARS-CoV-2 Rapid ID NOW Not Detected Not Detected              



             (test code = 15855-5)                                        

 

             MAL (test code = MAL) ID NOW COVID-19 Assay                        

   



                          is an isothermal                           



                          nucleic acid                           



                          amplification test                           



                          intended for the                           



                          qualitative detection                           



                          of nucleic acid from                           



                          SARS-CoV-2 viral RNA                           



                          in nasopharyngeal (NP)                           



                          specimens. It is used                           



                          under Emergency Use                           



                          Authorization (EUA) by                           



                          FDA. The limit of                           



                          detection (LOD) of the                           



                          assay is 125 Genome                           



                          Equivalents/mL. A                           



                          positive result is                           



                          indicative of the                           



                          presence of SARS-CoV-2                           



                          RNA. ?Clinical                           



                          correlation with                           



                          patient history and                           



                          other diagnostic                           



                          information is                           



                          necessary to determine                           



                          patient infection                           



                          status. A negative                           



                          (Not Detected) result                           



                          does not preclude                           



                          SARS-CoV-2 infection.                           



                          In patients with                           



                          clinical symptoms and                           



                          other tests that are                           



                          consistent with                           



                          SARS-CoV-2 infection,                           



                          negative results                           



                          should be treated as                           



                          presumptive negative                           



                          and a new specimen                           



                          should be tested with                           



                          alternative PCR                           



                          molecular test.                           



                          Invalid: Please                           



                          collect a new specimen                           



                          for repeat patient                           



                          testing if clinically                           



                          indicated.                             

 

             Lab Interpretation Normal                                 



             (test code = 27668-1)                                        



[facility]. METABOLIC PANEL ([id number])2021-05-10 
01:00:33





             Test Item    Value        Reference Range Interpretation Comments

 

             NA (test code = 140 mmol/L   135-145                   



             8361763503)                                         

 

             K (test code = 4.4 mmol/L   3.5-5.0                   



             8680864579)                                         

 

             CL (test code = 103 mmol/L                       



             4929900237)                                         

 

             CO2 TOTAL (test code = 28 mmol/L    23-31                     



             3383941027)                                         

 

             AGAP (test code =              2-16                      



             2291714782)                                         

 

             BUN (test code = 15 mg/dL     7-23                      



             3175380441)                                         

 

             GLUCOSE (test code = 85 mg/dL                         



             3724344627)                                         

 

             CREATININE (test code = 0.60 mg/dL   0.60-1.25                 



             6350082205)                                         

 

             TOTAL BILI (test code = 1.1 mg/dL    0.1-1.1                   



             4163820307)                                         

 

             CALCIUM (test code = 9.0 mg/dL    8.6-10.6                  



             4748909166)                                         

 

             T PROTEIN (test code = 7.8 g/dL     6.3-8.2                   



             3058355923)                                         

 

             ALBUMIN (test code = 4.0 g/dL     3.5-5.0                   



             9370977549)                                         

 

             ALK PHOS (test code = 166 U/L             H            



             4271563637)                                         

 

             ALTv (test code = 42 U/L                             



             1742-6)                                             

 

             AST(SGOT) (test code = 32 U/L       1340                     



             2430683116)                                         

 

             eGFR (test code =              mL/min/1.73m2              



             6115942578)                                         

 

             MAL (test code = MAL) Association of                           



                          Glomerular Filtration                           



                          Rate (GFR) and Staging                           



                          of Kidney Disease*                           



                          +---------------------                           



                          --+-------------------                           



                          --+-------------------                           



                          ------+| GFR                           



                          (mL/min/1.73 m2) ?|                           



                          With Kidney Damage ?|                           



                          ?Without Kidney                           



                          Damage+---------------                           



                          --------+-------------                           



                          --------+-------------                           



                          ------------+| ?>90 ?                           



                          ? ? ? ? ? ? ? ?|                           



                          ?Stage one ? ? ? ? ?|                           



                          ? Normal ? ? ? ? ? ? ?                           



                          ?+--------------------                           



                          ---+------------------                           



                          ---+------------------                           



                          -------+| ?60-89 ? ? ?                           



                          ? ? ? ? ?| ?Stage two                           



                          ? ? ? ? ?| ? Decreased                           



                          GFR ? ? ? ?                            



                          +---------------------                           



                          --+-------------------                           



                          --+-------------------                           



                          ------+| ?30-59 ? ? ?                           



                          ? ? ? ? ?| ?Stage                           



                          three ? ? ? ?| ? Stage                           



                          three ? ? ? ? ?                           



                          +---------------------                           



                          --+-------------------                           



                          --+-------------------                           



                          ------+| ?15-29 ? ? ?                           



                          ? ? ? ? ?| ?Stage four                           



                          ? ? ? ? | ? Stage four                           



                          ? ? ? ? ?                              



                          ?+--------------------                           



                          ---+------------------                           



                          ---+------------------                           



                          -------+| ?<15 (or                           



                          dialysis) ? ?| ?Stage                           



                          five ? ? ? ? | ? Stage                           



                          five ? ? ? ? ?                           



                          ?+--------------------                           



                          ---+------------------                           



                          ---+------------------                           



                          -------+ *Each stage                           



                          assumes the associated                           



                          GFR level has been in                           



                          effect for at least                           



                          three months. ?Stages                           



                          1 to 5, with or                           



                          without kidney                           



                          disease, indicate                           



                          chronic kidney                           



                          disease. Notes:                           



                          Determination of                           



                          stages one and two                           



                          (with eGFR                             



                          >59mL/min/1.73 m2)                           



                          requires estimation of                           



                          kidney damage for at                           



                          least three months as                           



                          defined by structural                           



                          or functional                           



                          abnormalities of the                           



                          kidney, manifested by                           



                          either:Pathological                           



                          abnormalities or                           



                          Markers of kidney                           



                          damage (including                           



                          abnormalities in the                           



                          composition of the                           



                          blood or urine or                           



                          abnormalities in                           



                          imaging tests).                           

 

             Lab Interpretation Abnormal                               



             (test code = 91105-9)                                        



[facility]





             Test Item    Value        Reference Range Interpretation Comments

 

             LIPASE (test code = 8242956395) 57 U/L       0-220                 

    

 

             Lab Interpretation (test code = Normal                             

    



             84590-8)                                            



[facility]





             Test Item    Value        Reference Range Interpretation Comments

 

             APPEARANCE (test code = Turbid       Clear        A            



             2486560680)                                         

 

             COLOR (test code = Yellow       Yellow                    



             9569706440)                                         

 

             PH (test code =              4.8-8.0                   



             3716488544)                                         

 

             SP GRAVITY (test code =              1.003-1.030               



             7558866957)                                         

 

             GLU U QUAL (test code = Normal       Normal                    



             9521603436)                                         

 

             BLOOD (test code = 1+           Negative     A            



             0176793101)                                         

 

             KETONES (test code = 20 mg/dL     Negative     A            



             0964613350)                                         

 

             PROTEIN (test code = 100 mg/dL    Negative     A            



             2887-8)                                             

 

             UROBILIN (test code = 2.0 mg/dL    Normal       A            



             1444275819)                                         

 

             BILIRUBIN (test code = Negative     Negative                  



             0756398463)                                         

 

             NITRITE (test code = Positive     Negative     A            



             3927918485)                                         

 

             LEUK FRANCE (test code = 250/uL       Negative     A            



             2876835101)                                         

 

             RBC/HPF (test code =              See_Comment  H             [Autom

ated message]



             0804184762)                                         The system [employer]



                                                                 generated this



                                                                 result transmit

huma



                                                                 reference range

: 0 -



                                                                 3 HPF. The refe

rence



                                                                 range was not u

sed



                                                                 to interpret th

is



                                                                 result as



                                                                 normal/abnormal

.

 

             WBC/HPF (test code = >182         See_Comment  H             [Autom

ated message]



             1199400694)                                         The system [employer]



                                                                 generated this



                                                                 result transmit

huma



                                                                 reference range

: 0 -



                                                                 5 HPF. The refe

rence



                                                                 range was not u

sed



                                                                 to interpret th

is



                                                                 result as



                                                                 normal/abnormal

.

 

             BACTERIA (test code = Many         Negative     A            



             2254297511)                                         

 

             MUCOUS (test code = Moderate     Negative LPF A            



             7832509855)                                         

 

             WBC CLUMPS (test code =              See_Comment  H             [Au

tomated message]



             7730619110)                                         The system [employer]



                                                                 generated this



                                                                 result transmit

huma



                                                                 reference range

: <=1



                                                                 HPF. The refere

nce



                                                                 range was not u

sed



                                                                 to interpret th

is



                                                                 result as



                                                                 normal/abnormal

.

 

             Lab Interpretation (test Abnormal                               



             code = 51799-1)                                        



[facility] WITH DIFF2021-05-10 00:46:14





             Test Item    Value        Reference Range Interpretation Comments

 

             WBC (test code =              See_Comment                [Automated



             3990-2)                                             message] The sy

stem



                                                                 which generated



                                                                 this result



                                                                 transmitted



                                                                 reference range

:



                                                                 4.20 - 10.70



                                                                 10*3/?L. The



                                                                 reference range

 was



                                                                 not used to



                                                                 interpret this



                                                                 result as



                                                                 normal/abnormal

.

 

             RBC (test code =              See_Comment                [Automated



             789-8)                                              message] The sy

stem



                                                                 which generated



                                                                 this result



                                                                 transmitted



                                                                 reference range

:



                                                                 4.26 - 5.52



                                                                 10*6/?L. The



                                                                 reference range

 was



                                                                 not used to



                                                                 interpret this



                                                                 result as



                                                                 normal/abnormal

.

 

             HGB (test code = 15.9 g/dL    12.2-16.4                 



             718-7)                                              

 

             HCT (test code = 47.1 %       38.4-49.3                 



             4544-3)                                             

 

             MCV (test code = 86.6 fL      81.7-95.6                 



             787-2)                                              

 

             MCH (test code = 29.2 pg      26.1-32.7                 



             785-6)                                              

 

             MCHC (test code = 33.8 g/dL    31.2-35.0                 



             786-4)                                              

 

             RDW-SD (test code = 47.6 fL      38.5-51.6                 



             32554-7)                                            

 

             RDW-CV (test code = 15.2 %       12.1-15.4                 



             788-0)                                              

 

             PLT (test code =              See_Comment  H             [Automated



             777-3)                                              message] The sy

stem



                                                                 which generated



                                                                 this result



                                                                 transmitted



                                                                 reference range

:



                                                                 150 - 328 10*3/

?L.



                                                                 The reference r

zhen



                                                                 was not used to



                                                                 interpret this



                                                                 result as



                                                                 normal/abnormal

.

 

             MPV (test code = 9.4 fL       9.8-13.0     L            



             22788-9)                                            

 

             NRBC/100 WBC (test              See_Comment                [Automat

ed



             code = 2132253203)                                        message] 

The system



                                                                 which generated



                                                                 this result



                                                                 transmitted



                                                                 reference range

:



                                                                 0.0 - 10.0 /100



                                                                 WBCs. The refer

ence



                                                                 range was not u

sed



                                                                 to interpret th

is



                                                                 result as



                                                                 normal/abnormal

.

 

             NRBC x10^3 (test code <0.01        See_Comment                [Auto

mated



             = [id number])                                        message] The s

ystem



                                                                 which generated



                                                                 this result



                                                                 transmitted



                                                                 reference range

:



                                                                 10*3/?L. The



                                                                 reference range

 was



                                                                 not used to



                                                                 interpret this



                                                                 result as



                                                                 normal/abnormal

.

 

             GRAN MAT (NEUT) % 61.3 %                                 



             (test code = 770-8)                                        

 

             IMM GRAN % (test code 0.70 %                                 



             = [id number])                                        

 

             LYMPH % (test code = 26.4 %                                 



             736-9)                                              

 

             MONO % (test code = 9.9 %                                  



             5905-5)                                             

 

             EOS % (test code = 1.3 %                                  



             713-8)                                              

 

             BASO % (test code = 0.4 %                                  



             706-2)                                              

 

             GRAN MAT x10^3(ANC) 6.39 10*3/uL 1.99-6.95                 



             (test code =                                        



             6865517940)                                         

 

             IMM GRAN x10^3 (test 0.07 10*3/uL 0.00-0.06    H            



             code = [id number])                                        

 

             LYMPH x10^3 (test code 2.75 10*3/uL 1.09-3.23                 



             = 731-0)                                            

 

             MONO x10^3 (test code 1.03 10*3/uL 0.36-1.02    H            



             = 742-7)                                            

 

             EOS x10^3 (test code = 0.14 10*3/uL 0.06-0.53                 



             711-2)                                              

 

             BASO x10^3 (test code 0.04 10*3/uL 0.01-0.09                 



             = 704-7)                                            

 

             Lab Interpretation Abnormal                               



             (test code = 58721-4)                                        



[id number]





             Test Item    Value        Reference Range Interpretation Comments

 

             POC-GLUCOSE METER 140 mg/dL           H            : TESTED A

T [facility] [address]



             (BEAKER) (test code =                                        [facility],



             [facility])                                               [address]:



                                                                 /Techni

christina ID



                                                                 = [id number] for AMAYA ACOSTA,



                                                                 ANANT



POCT-GLUCOSE [id number] 09:15:00





             Test Item    Value        Reference Range Interpretation Comments

 

             POC-GLUCOSE METER 142 mg/dL           H            : TESTED A

T [facility] [address]



             (BEAKER) (test code =                                        [facility],



             [facility])                                               [address]:



                                                                 /Techni

christina ID



                                                                 = [id number] for AMAYA ACOSTA,



                                                                 ANANT



POCT-GLUCOSE METER2020-01-15 20:56:00





             Test Item    Value        Reference Range Interpretation Comments

 

             POC-GLUCOSE METER 134 mg/dL           H            : TESTED A

T [facility] [address]



             (BEAKER) (test code =                                        [facility],



             [address])                                               [address]:



                                                                 /Techni

christina ID



                                                                 = [id number] for VANNESSA GARCIA



                                                                 TIA



POCT-GLUCOSE METER2020-01-15 16:46:00





             Test Item    Value        Reference Range Interpretation Comments

 

             POC-GLUCOSE METER 91 mg/dL                         : TESTED A

T [facility] [address]



             (BEAKER) (test code =                                        [facility],



             153)                                               [address]:



                                                                 /Techni

christina ID =



                                                                 [id number] for CATINA LAKHANI,



                                                                 ANANT



POCT-GLUCOSE METER2020-01-15 12:19:00





             Test Item    Value        Reference Range Interpretation Comments

 

             POC-GLUCOSE METER 237 mg/dL           H            : TESTED A

[facility] [address]



             (KUN) (test code =                                        [place],



             [address])                                               [address]:



                                                                 /Techni

christina ID



                                                                 = [id number] for ANANT CATES



MR, EXTREMITY, LOWER, WITHOUT CONTRAST, RIGHT2020-01-15 09:12:00FINAL REPORT 
PATIENT ID: [id number] MRI of the right and left hindfoot without intravenous 
contrast History: Osteomyelitis, diabetic foot Comparison: Radiograph dated 
2020 Technique: Multiplanar multisequence MRI of the right and left 
hindfoot was performed without intravenous contrast using the hindfoot 
osteomyelitis protocol Findings: Right foot: Marked T1 and T2 hypointense soft 
tissue thickening is noted at the medial aspect of the right heel, likely to 
reflect scar tissue. There is also mild subcutaneous edema at the lateral aspect
of the mid foot and forefoot, incompletely imaged. Nodiscrete drainable fluid 
collection is identified. There is no marrow signal abnormality to suggest o
steomyelitis. There is a small nonspecific joint effusion at the ankle and 
subtalar joint. Scattereddegenerative changes are noted. There is marked fatty 
atrophy of the distal leg and foot musculature. Severe flexor hallucis longus 
tendinopathy. No tenosynovitis. Left foot: There is a large area of soft tissue 
defect and granulation tissue at the posteromedial aspect of the heel, reaching 
the calcaneus, but there is no drainable fluid collection. Deformity is noted in
the hindfoot, and the forefootappears adducted. No acute fracture. No marrow 
signal abnormality is identified to indicate acute osteomyelitis. There is no 
significant joint effusion. There is severe atrophy of the foot and distal leg 
musculature. No significant tenosynovitis identified. IMPRESSION: 
Granulation/scar tissue at the medial aspect of the heel bilaterally. No MR 
evidence of osteomyelitis. Severe muscle atrophy. Signed:Nguyen Cornejo 
Verified Date/Time: 01/15/2020 09:12:01 Reading Location: [facility] C013X Ortho 
Consult Reading Room Electronically signed by: NGUYEN CORNEJO M.D. on 01/15/2020 
09:12 AMMR, EXTREMITY, LOWER, WITHOUT CONTRAST, LEFT2020-01-15 09:12:00FINAL 
REPORT PATIENT ID: [id number] MRI of the right and left hindfoot without 
intravenous contrast History: Osteomyelitis, diabetic foot Comparison: 
Radiograph dated 2020 Technique: Multiplanar multisequence MRI of the
right and left hindfoot was performed without intravenous contrast using the 
hindfoot osteomyelitis protocol Findings: Right foot: Marked T1 and T2 
hypointense soft tissue thickening is noted at the medial aspect of the right 
heel, likely to reflect scar tissue. There is also mild subcutaneous edema at 
the lateral aspect of the mid foot and forefoot, incompletely imaged. Nodiscrete
drainable fluid collection is identified. There is no marrow signal abnormality 
to suggest osteomyelitis. There is a small nonspecific joint effusion at the 
ankle and subtalar joint. Scattereddegenerative changes are noted. There is 
marked fatty atrophy of the distal leg and foot musculature. Severe flexor 
hallucis longus tendinopathy. No tenosynovitis. Left foot: There is a large area
of soft tissue defect and granulation tissue at the posteromedial aspect of the 
heel, reaching the calcaneus, but there is no drainable fluid collection. 
Deformity is noted in the hindfoot, and the forefootappears adducted. No acute 
fracture. No marrow signal abnormality is identified to indicate acute ost
eomyelitis. There is no significant joint effusion. There is severe atrophy of 
the foot and distal leg musculature. No significant tenosynovitis identified. 
IMPRESSION: Granulation/scar tissue at the medial aspect of the heel 
bilaterally. No MR evidence of osteomyelitis. Severe muscle atrophy. Signed:Nguyen Cornejo Verified Date/Time: 01/15/2020 09:12:01 Reading Location: [address] Ortho Consult Reading Room Electronically signed by: NGUYEN CORNEJO M.D. on 
01/15/2020 09:12 AMPOCT-GLUCOSE METER2020-01-15 08:47:00





             Test Item    Value        Reference Range Interpretation Comments

 

             POC-GLUCOSE METER 264 mg/dL           H            : TESTED A

[facility] [address]



             (RAJEEVTAL) (test code =                                        [place],



             [address])                                               [address]:



                                                                 /Techni

christina ID



                                                                 = [id number] for ANANT CATES



ISLET CELL AB SCR2020-01-15 07:43:00





             Test Item    Value        Reference Range Interpretation Comments

 

             ISLET CELL AB Refer to individual                           



             AUTOVERIFICATION (test Islet Cell Ab                           



             code = 2556) and/or Islet Cell                           



                          Ab Titer results.                           



POCT-GLUCOSE [id number] 21:27:00





             Test Item    Value        Reference Range Interpretation Comments

 

             POC-GLUCOSE METER 130 mg/dL           H            : TESTED A

T [facility] [address]



             ([facility]) (test code =                                        [facility],



             )                                               [address]:



                                                                 /Techni

christina ID



                                                                 = [id number] for KRANTHI PIERRE



BLOOD [id number] 13:00:00





             Test Item    Value        Reference Range Interpretation Comments

 

             CULTURE (BEAKER) (test No growth in 5 days                         

  



             code = 1095)                                        



BLOOD [id number] 13:00:00





             Test Item    Value        Reference Range Interpretation Comments

 

             CULTURE (BEAKER) (test No growth in 5 days                         

  



             code = 1095)                                        



POCT-GLUCOSE [id number] 12:57:00





             Test Item    Value        Reference Range Interpretation Comments

 

             POC-GLUCOSE METER 138 mg/dL           H            : TESTED A

T [facility] [address]



             ([facility]) (test code =                                        [facility],



             )                                               [address]:



                                                                 /Techni

christina ID



                                                                 = [id number] for [place]

LLIS,



                                                                 BARBARA



POCT-GLUCOSE [id number] 08:43:00





             Test Item    Value        Reference Range Interpretation Comments

 

             POC-GLUCOSE METER 226 mg/dL           H            : TESTED A

T [facility] [address]



             ([facility]) (test code =                                        [facility],



             )                                               [address]:



                                                                 /Techni

christina ID



                                                                 = [id number] for [place]

LLIS,



                                                                 BARBARA



POCT-GLUCOSE [id number] 21:11:00





             Test Item    Value        Reference Range Interpretation Comments

 

             POC-GLUCOSE METER 254 mg/dL           H            : Notified

 RN/MD:



             ([facility]) (test code =                                        TESTED

 AT [facility] [address]



             )                                               [facility],



                                                                 [address]:



                                                                 /Techni

christina ID



                                                                 = [id number] for KRANTHI PIERRE



POCT-GLUCOSE [id number] 21:02:00





             Test Item    Value        Reference Range Interpretation Comments

 

             POC-GLUCOSE METER 177 mg/dL           H            : TESTED A

[facility] [address]



             [facility] ([facility] =                                        [facility],



             [address])                                               [address]:



                                                                 /Techni

christina ID



                                                                 = [id number] for [place]

LLIS,



                                                                 BARBARA



POCT-GLUCOSE [id number] 12:31:00





             Test Item    Value        Reference Range Interpretation Comments

 

             POC-GLUCOSE METER 215 mg/dL           H            : TESTED A

[facility] [address]



             (BEAKER) (test code =                                        [place],



             [address])                                               [address]:



                                                                 /Techni

christina ID



                                                                 = [id number] for [place]

LLIS,



                                                                 BARBARA



WOUND CULTURE + GRAM [id number] 10:10:00





             Test Item    Value        Reference    Interpretation Comments



                                       Range                     

 

             CULTURE (BEAKER) PROTEUS MIRABILIS              A            1+ Pro

teus



             (test code = 1095)                                        mirabilis

 

             Amikacin (test code =                           S            



             1)                                                  

 

             Ampicillin +                           S            



             Sulbactam (test code                                        



             = 6)                                                

 

             Aztreonam (test code                           S            



             = 32)                                               

 

             Cefepime (test code =                           S            



             51)                                                 

 

             Cefoxitin (test code                           R            



             = 68)                                               

 

             Ceftazidime (test                           S            



             code = 27)                                          

 

             Ceftriaxone (test                           S            



             code = 52)                                          

 

             Ertapenem (test code                           S            



             = 38)                                               

 

             Gentamicin (test code                           S            



             = 18)                                               

 

             Levofloxacin (test                           R            



             code = 22)                                          

 

             Meropenem (test code                           S            



             = 34)                                               

 

             Piperacillin +                           S            



             Tazobactam (test code                                        



             = 29)                                               

 

             Tetracycline (test                           R            



             code = 2)                                           

 

             Tobramycin (test code                           S            



             = 25)                                               

 

             Trimethoprim +                           R            



             Sulfamethoxazole                                        



             (test code = 47)                                        

 

             CULTURE (BEAKER) METHICILLIN               A            1+ Methicil

carine



             (test code = 1095) RESISTANT                              resistant



                          STAPHYLOCOCCUS                           Staphylococcu

s



                          AUREUS                                 aureus

 

             Clindamycin (test                           R            



             code = 10)                                          

 

             Erythromycin (test                           R            



             code = 4)                                           

 

             Linezolid (test code                           S            



             = 40)                                               

 

             Nitrofurantoin (test                           S            



             code = 23)                                          

 

             Oxacillin (test code                           R            



             = 14)                                               

 

             Rifampin (test code =                           S            



             43)                                                 

 

             Tetracycline (test                           S            



             code = 2)                                           

 

             Trimethoprim +                           S            



             Sulfamethoxazole                                        



             (test code = 47)                                        

 

             Vancomycin (test code                           S            



             = 13)                                               

 

             CULTURE (BEAKER) ESCHERICHIA COLI              A            <1+ Esc

herichia



             (test code = 1095)                                        coliESBL 

Positive

 

             Amikacin (test code =                           S            



             1)                                                  

 

             Ampicillin +                           R            



             Sulbactam (test code                                        



             = 6)                                                

 

             Aztreonam (test code                           R            



             = 32)                                               

 

             Cefepime (test code =                           R            



             51)                                                 

 

             Cefoxitin (test code                           R            



             = 68)                                               

 

             Ceftazidime (test                           R            



             code = 27)                                          

 

             Ceftriaxone (test                           R            



             code = 52)                                          

 

             Ertapenem (test code                           S            



             = 38)                                               

 

             Gentamicin (test code                           S            



             = 18)                                               

 

             Levofloxacin (test                           R            



             code = 22)                                          

 

             Meropenem (test code                           S            



             = 34)                                               

 

             Nitrofurantoin (test                           S            



             code = 23)                                          

 

             Piperacillin +                           R            



             Tazobactam (test code                                        



             = 29)                                               

 

             Tetracycline (test                           S            



             code = 2)                                           

 

             Tobramycin (test code                           S            



             = 25)                                               

 

             Trimethoprim +                           R            



             Sulfamethoxazole                                        



             (test code = 47)                                        

 

             CULTURE (BEAKER)                           A            Beta-hemoly

tic



             (test code = 1095)                                        streptoco

ccus



                                                                 group B, by



                                                                 serological



                                                                 grouping

 

             GRAM STAIN RESULT 3+ WBCs                                



             (BEAKER) (test code =                                        



             1123)                                               

 

             GRAM STAIN RESULT 1+ gram negative                           



             (BEAKER) (test code = rods                                   



             146424)                                             

 

             GRAM STAIN RESULT 2+ gram positive                           



             (BEAKER) (test code = rods                                   



             439251)                                             

 

             GRAM STAIN RESULT <1+ gram negative                           



             (BEAKER) (test code = coccobacilli                           



             884774)                                             

 

             GRAM STAIN RESULT 2+ gram positive                           



             (BEAKER) (test code = cocci in pairs                           



             476630)                                             



POCT-GLUCOSE [id number] 08:52:00





             Test Item    Value        Reference Range Interpretation Comments

 

             POC-GLUCOSE METER 209 mg/dL           H            : TESTED A

T [facility] [address]



             (BEAKER) (test code =                                        [facility],



             153)                                               [address]:



                                                                 /Techni

christina ID



                                                                 = [id number] for [place]

CRESENCIO,



                                                                 BARBARA



POCT-GLUCOSE [id number] 21:26:00





             Test Item    Value        Reference Range Interpretation Comments

 

             POC-GLUCOSE METER 255 mg/dL           H            : TESTED A

T [facility] [address]



             (BEAKER) (test code =                                        [facility],



             153)                                               [address]:



                                                                 /Techni

christina ID



                                                                 = [id number] for SHERRILL GUARDADO



POCT-GLUCOSE [id number] 17:34:00





             Test Item    Value        Reference Range Interpretation Comments

 

             POC-GLUCOSE METER 227 mg/dL           H            : TESTED A

T [facility] [address]



             (BEAKER) (test code =                                        [facility],



             153)                                               [address]:



                                                                 /Techni

christina ID



                                                                 = [id number] for WASSERMAN

DALTON,



                                                                 NAKITA



POCT-GLUCOSE [id number] 11:28:00





             Test Item    Value        Reference Range Interpretation Comments

 

             POC-GLUCOSE METER 282 mg/dL           H            : TESTED A

T [facility] [address]



             (BEAKER) (test code =                                        [facility],



             153)                                               [address]:



                                                                 /Techni

christina ID



                                                                 = [id number] for NAKITA JENSEN



POCT-GLUCOSE [id number] 08:19:00





             Test Item    Value        Reference Range Interpretation Comments

 

             POC-GLUCOSE METER 329 mg/dL           H            : TESTED A

T [facility] [address]



             ([facility]) (test code =                                        [facility],



             1538)                                               [address]:



                                                                 /Techni

christina ID



                                                                 = [id number] for ANANT CATES



POCT-GLUCOSE [id number] 21:32:00





             Test Item    Value        Reference Range Interpretation Comments

 

             POC-GLUCOSE METER 275 mg/dL           H            : TESTED A

T [facility] [address]



             ([facility]) (test code =                                        [facility],



             153)                                               [address]:



                                                                 /Techni

christina ID



                                                                 = [id number] for SHERRILL GUARDADO



POCT-GLUCOSE [id number] 17:47:00





             Test Item    Value        Reference Range Interpretation Comments

 

             POC-GLUCOSE METER 304 mg/dL           H            : TESTED A

T [facility] [address]



             ([facility]) (test code =                                        [facility],



             153)                                               [address]:



                                                                 /Techni

christina ID



                                                                 = [id number] for [place]

SAVAGE,



                                                                 LUIZA



URINE [id number] 15:21:00





             Test Item    Value        Reference Range Interpretation Comments

 

             CULTURE ([facility])                           A            >100,000 co

l/mL



             (test code = 1095)                                        Beta-hemo

lytic



                                                                 streptococcus g

roup



                                                                 B, by serologic

al



                                                                 grouping

 

             CULTURE ([facility]) PROTEUS                   A            80-89,000 c

ol/mL



             (test code = 1095) MIRABILIS                              Proteus



                                                                 mirabilisESBL



                                                                 Positive

 

             Amikacin (test code =                           S            



             1)                                                  

 

             Ampicillin +                           R            



             Sulbactam (test code                                        



             = 6)                                                

 

             Aztreonam (test code                           R            



             = 32)                                               

 

             Cefepime (test code =                           R            



             51)                                                 

 

             Cefoxitin (test code                           R            



             = 68)                                               

 

             Ceftazidime (test                           R            



             code = 27)                                          

 

             Ceftriaxone (test                           R            



             code = 52)                                          

 

             Ertapenem (test code                           S            



             = 38)                                               

 

             Gentamicin (test code                           R            



             = 18)                                               

 

             Levofloxacin (test                           R            



             code = 22)                                          

 

             Meropenem (test code                           S            



             = 34)                                               

 

             Nitrofurantoin (test                           R            



             code = 23)                                          

 

             Tetracycline (test                           R            



             code = 2)                                           

 

             Tobramycin (test code                           R            



             = 25)                                               



POCT-GLUCOSE [id number] 12:16:00





             Test Item    Value        Reference Range Interpretation Comments

 

             POC-GLUCOSE METER 337 mg/dL           H            : TESTED A

T [facility] [address]



             (BEAKER) (test code =                                        [facility],



             1538)                                               [address]:



                                                                 /Techni

christina ID



                                                                 = [id number] for LUIZA FIGUEROA



BASIC METABOLIC [id number] 10:39:00





             Test Item    Value        Reference Range Interpretation Comments

 

             SODIUM (BEAKER) 134 meq/L    136-145      L            



             (test code = 381)                                        

 

             POTASSIUM (BEAKER) 4.6 meq/L    3.5-5.1                   



             (test code = 379)                                        

 

             CHLORIDE (BEAKER) 105 meq/L                        



             (test code = 382)                                        

 

             CO2 (BEAKER) (test 20 meq/L     22-29        L            



             code = 355)                                         

 

             BLOOD UREA NITROGEN 14 mg/dL     7-21                      



             (BEAKER) (test code                                        



             = 354)                                              

 

             CREATININE (BEAKER) 0.97 mg/dL   0.57-1.25                 



             (test code = 358)                                        

 

             GLUCOSE RANDOM 429 mg/dL           HH           



             (BEAKER) (test code                                        



             = 652)                                              

 

             CALCIUM (BEAKER) 8.9 mg/dL    8.4-10.2                  



             (test code = 697)                                        

 

             EGFR (BEAKER) (test 107 mL/min/1.73                           ESTIM

ATED GFR IS



             code = 1092) sq m                                   NOT AS ACCURATE

 AS



                                                                 CREATININE



                                                                 CLEARANCE IN



                                                                 PREDICTING



                                                                 GLOMERULAR



                                                                 FILTRATION RATE

.



                                                                 ESTIMATED GFR I

S



                                                                 NOT APPLICABLE 

FOR



                                                                 DIALYSIS PATIEN

TS.



 ID - MAGAN FPOCT-GLUCOSE [id number] 08:29:00





             Test Item    Value        Reference Range Interpretation Comments

 

             POC-GLUCOSE METER 395 mg/dL           H            : TESTED A

T [facility] [address]



             (BEAKER) (test code =                                        [facility],



             1538)                                               [address]:



                                                                 /Techni

christina ID



                                                                 = [id number] for LUIZA FIGUEROA



CBC W/PLT COUNT &amp; AUTO [id number] 06:40:00





             Test Item    Value        Reference Range Interpretation Comments

 

             WHITE BLOOD CELL COUNT (BEAKER) 7.2 K/ L     3.5-10.5              

    



             (test code = 775)                                        

 

             RED BLOOD CELL COUNT (BEAKER) 5.48 M/ L    4.63-6.08               

  



             (test code = 761)                                        

 

             HEMOGLOBIN (BEAKER) (test code = 15.1 GM/DL   13.7-17.5            

     



             410)                                                

 

             HEMATOCRIT (BEAKER) (test code = 46.4 %       40.1-51.0            

     



             411)                                                

 

             MEAN CORPUSCULAR VOLUME (BEAKER) 84.7 fL      79.0-92.2            

     



             (test code = 753)                                        

 

             MEAN CORPUSCULAR HEMOGLOBIN 27.6 pg      25.7-32.2                 



             (BEAKER) (test code = 751)                                        

 

             MEAN CORPUSCULAR HEMOGLOBIN CONC 32.5 GM/DL   32.3-36.5            

     



             (BEAKER) (test code = 752)                                        

 

             RED CELL DISTRIBUTION WIDTH 15.5 %       11.6-14.4    H            



             (BEAKER) (test code = 412)                                        

 

             PLATELET COUNT (BEAKER) (test 315 K/CU MM  150-450                 

  



             code = 756)                                         

 

             MEAN PLATELET VOLUME (BEAKER) 10.5 fL      9.4-12.4                

  



             (test code = 754)                                        

 

             NUCLEATED RED BLOOD CELLS 0 /100 WBC   0-0                       



             (BEAKER) (test code = 413)                                        

 

             NEUTROPHILS RELATIVE PERCENT 62 %                                  

 



             (BEAKER) (test code = 429)                                        

 

             LYMPHOCYTES RELATIVE PERCENT 26 %                                  

 



             (BEAKER) (test code = 430)                                        

 

             MONOCYTES RELATIVE PERCENT 9 %                                    



             (BEAKER) (test code = 431)                                        

 

             EOSINOPHILS RELATIVE PERCENT 2 %                                   

 



             (BEAKER) (test code = 432)                                        

 

             BASOPHILS RELATIVE PERCENT 0 %                                    



             (BEAKER) (test code = 437)                                        

 

             NEUTROPHILS ABSOLUTE COUNT 4.48 K/ L    1.78-5.38                 



             (BEAKER) (test code = 670)                                        

 

             LYMPHOCYTES ABSOLUTE COUNT 1.87 K/ L    1.32-3.57                 



             (BEAKER) (test code = 414)                                        

 

             MONOCYTES ABSOLUTE COUNT (BEAKER) 0.62 K/ L    0.30-0.82           

      



             (test code = 415)                                        

 

             EOSINOPHILS ABSOLUTE COUNT 0.17 K/ L    0.04-0.54                 



             (BEAKER) (test code = 416)                                        

 

             BASOPHILS ABSOLUTE COUNT (BEAKER) 0.03 K/ L    0.01-0.08           

      



             (test code = 417)                                        

 

             IMMATURE GRANULOCYTES-RELATIVE 1 %          0-1                    

   



             PERCENT (BEAKER) (test code =                                      

  



             2801)                                               



POCT-GLUCOSE [id number] 19:55:00





             Test Item    Value        Reference Range Interpretation Comments

 

             POC-GLUCOSE METER 369 mg/dL           H            : TESTED A

T [facility] [address]



             (BEAKER) (test code =                                        [facility],



             1538)                                               [address]:



                                                                 /Techni

christina ID



                                                                 = [id number] for SHERRILL GUARDADO



POCT-GLUCOSE [id number] 16:45:00





             Test Item    Value        Reference Range Interpretation Comments

 

             POC-GLUCOSE METER 272 mg/dL           H            : TESTED A

T [facility] [address]



             (BEAKER) (test code =                                        [facility],



             1538)                                               [address]:



                                                                 /Techni

christina ID



                                                                 = [id number] for SARAH VIDES



POCT-GLUCOSE [id number] 11:40:00





             Test Item    Value        Reference Range Interpretation Comments

 

             POC-GLUCOSE METER 277 mg/dL           H            : TESTED A

T [facility] [address]



             (BEAKER) (test code =                                        [facility],



             1538)                                               [address]:



                                                                 /Techni

christina ID



                                                                 = [id number] for SARAH VIDES



BASIC METABOLIC [id number] 10:22:00





             Test Item    Value        Reference Range Interpretation Comments

 

             SODIUM (BEAKER) 132 meq/L    136-145      L            



             (test code = 381)                                        

 

             POTASSIUM (BEAKER) 4.4 meq/L    3.5-5.1                   



             (test code = 379)                                        

 

             CHLORIDE (BEAKER) 103 meq/L                        



             (test code = 382)                                        

 

             CO2 (BEAKER) (test 21 meq/L     22-29        L            



             code = 355)                                         

 

             BLOOD UREA NITROGEN 14 mg/dL     7-21                      



             (BEAKER) (test code                                        



             = 354)                                              

 

             CREATININE (BEAKER) 0.89 mg/dL   0.57-1.25                 



             (test code = 358)                                        

 

             GLUCOSE RANDOM 428 mg/dL           HH           



             (BEAKER) (test code                                        



             = 652)                                              

 

             CALCIUM (BEAKER) 9.2 mg/dL    8.4-10.2                  



             (test code = 697)                                        

 

             EGFR (BEAKER) (test 119 mL/min/1.73                           ESTIM

ATED GFR IS



             code = 1092) sq m                                   NOT AS ACCURATE

 AS



                                                                 CREATININE



                                                                 CLEARANCE IN



                                                                 PREDICTING



                                                                 GLOMERULAR



                                                                 FILTRATION RATE

.



                                                                 ESTIMATED GFR I

S



                                                                 NOT APPLICABLE 

FOR



                                                                 DIALYSIS PATIEN

TS.



 ID - NTPLIPID [id number] 10:17:00





             Test Item    Value        Reference Range Interpretation Comments

 

             TRIGLYCERIDES (BEAKER) (test code = 692 mg/dL                      

        



             540)                                                

 

             CHOLESTEROL (BEAKER) (test code = 196 mg/dL                        

      



             631)                                                

 

             HDL CHOLESTEROL (BEAKER) (test code 24 mg/dL                       

        



             = 976)                                              



Calculated LDL not valid if triglyceride &gt;400 mg/dLTriglyceride Reference 
Range: Low Risk &lt;150Borderline 150-199 High Risk 200-499 Very High Risk 
&gt;=500Cholesterol Reference Range: Low Risk &lt;200 Borderline 200-239 High 
Risk &gt;240HDL Cholesterol Reference Range: Low Risk &gt;=60 High Risk&lt;40LDL
Cholesterol Reference Range: Optimal &lt;100 Near Optimal 100-129 Borderline 
130-159 High 160-189 Very High &gt;=190  ID - [id number] METER
2020-01-10 08:28:00





             Test Item    Value        Reference Range Interpretation Comments

 

             POC-GLUCOSE METER 388 mg/dL           H            : TESTED A

T [facility] [address]



             (BEAKER) (test code =                                        [place],



             1538)                                               [id number]:



                                                                 /Techni

christina ID



                                                                 = [id number] for ANANT CATES



HEMOGLOBIN A1C2020-01-10 07:54:00





             Test Item    Value        Reference Range Interpretation Comments

 

             HEMOGLOBIN A1C (BEAKER) (test code = 10.4 %       4.3-6.1      H   

         



             368)                                                



CBC W/PLT COUNT &amp; AUTO DIFFERENTIAL2020-01-10 05:56:00





             Test Item    Value        Reference Range Interpretation Comments

 

             WHITE BLOOD CELL COUNT (BEAKER) 6.5 K/ L     3.5-10.5              

    



             (test code = 775)                                        

 

             RED BLOOD CELL COUNT (BEAKER) 5.21 M/ L    4.63-6.08               

  



             (test code = 761)                                        

 

             HEMOGLOBIN (BEAKER) (test code = 14.6 GM/DL   13.7-17.5            

     



             410)                                                

 

             HEMATOCRIT (BEAKER) (test code = 44.3 %       40.1-51.0            

     



             411)                                                

 

             MEAN CORPUSCULAR VOLUME (BEAKER) 85.0 fL      79.0-92.2            

     



             (test code = 753)                                        

 

             MEAN CORPUSCULAR HEMOGLOBIN 28.0 pg      25.7-32.2                 



             (BEAKER) (test code = 751)                                        

 

             MEAN CORPUSCULAR HEMOGLOBIN CONC 33.0 GM/DL   32.3-36.5            

     



             (BEAKER) (test code = 752)                                        

 

             RED CELL DISTRIBUTION WIDTH 15.6 %       11.6-14.4    H            



             (BEAKER) (test code = 412)                                        

 

             PLATELET COUNT (BEAKER) (test 294 K/CU MM  150-450                 

  



             code = 756)                                         

 

             MEAN PLATELET VOLUME (BEAKER) 11.2 fL      9.4-12.4                

  



             (test code = 754)                                        

 

             NUCLEATED RED BLOOD CELLS 0 /100 WBC   0-0                       



             (BEAKER) (test code = 413)                                        

 

             NEUTROPHILS RELATIVE PERCENT 56 %                                  

 



             (BEAKER) (test code = 429)                                        

 

             LYMPHOCYTES RELATIVE PERCENT 31 %                                  

 



             (BEAKER) (test code = 430)                                        

 

             MONOCYTES RELATIVE PERCENT 10 %                                   



             (BEAKER) (test code = 431)                                        

 

             EOSINOPHILS RELATIVE PERCENT 2 %                                   

 



             (BEAKER) (test code = 432)                                        

 

             BASOPHILS RELATIVE PERCENT 1 %                                    



             (BEAKER) (test code = 437)                                        

 

             NEUTROPHILS ABSOLUTE COUNT 3.66 K/ L    1.78-5.38                 



             (BEAKER) (test code = 670)                                        

 

             LYMPHOCYTES ABSOLUTE COUNT 2.03 K/ L    1.32-3.57                 



             (BEAKER) (test code = 414)                                        

 

             MONOCYTES ABSOLUTE COUNT (BEAKER) 0.63 K/ L    0.30-0.82           

      



             (test code = 415)                                        

 

             EOSINOPHILS ABSOLUTE COUNT 0.15 K/ L    0.04-0.54                 



             (BEAKER) (test code = 416)                                        

 

             BASOPHILS ABSOLUTE COUNT (BEAKER) 0.03 K/ L    0.01-0.08           

      



             (test code = 417)                                        

 

             IMMATURE GRANULOCYTES-RELATIVE 1 %          0-1                    

   



             PERCENT (BEAKER) (test code =                                      

  



             2801)                                               



POCT-GLUCOSE [id number] 23:47:00





             Test Item    Value        Reference Range Interpretation Comments

 

             POC-GLUCOSE METER 359 mg/dL           H            : Notified

 RN/MD:



             ([facility]) (test code =                                        TESTED

 AT [place] [address]



             153)                                               [facility],



                                                                 [address]:



                                                                 /Techni

christina ID



                                                                 = [id number] for



                                                                 LATHBRIDGE, ALESSIA

ICE



POCT-GLUCOSE [id number] 21:13:00





             Test Item    Value        Reference Range Interpretation Comments

 

             POC-GLUCOSE METER 315 mg/dL           H            : TESTED A

T [place] [address]



             ([facility]) (test code =                                        [facility],



             [address])                                               [id number]:



                                                                 /Techni

christina ID



                                                                 = [id number] for Sm

ith,



                                                                 Nicki



POCT-GLUCOSE [id number] 21:13:00





             Test Item    Value        Reference Range Interpretation Comments

 

             POC-GLUCOSE METER 331 mg/dL           H            : TESTED A

T [facility] [address]



             (BEAKER) (test code =                                        [place],



             [address]38)                                               [id number]:



                                                                 /Techni

christina ID



                                                                 = [id number] for OSCAR HAQUE



POCT-GLUCOSE [id number] 10:30:00





             Test Item    Value        Reference Range Interpretation Comments

 

             POC-GLUCOSE METER 341 mg/dL           H            : TESTED A

T [facility] [address]



             (BEAKER) (test code =                                        [place],



             [address]38)                                               [address]:



                                                                 /Techni

christina ID



                                                                 = [id number] for Nicki Arciniega



CBC W/PLT COUNT &amp; AUTO [id number] 08:48:00





             Test Item    Value        Reference Range Interpretation Comments

 

             WHITE BLOOD CELL COUNT (BEAKER) 6.7 K/ L     3.5-10.5              

    



             (test code = 775)                                        

 

             RED BLOOD CELL COUNT (BEAKER) 5.28 M/ L    4.63-6.08               

  



             (test code = 761)                                        

 

             HEMOGLOBIN (BEAKER) (test code = 14.6 GM/DL   13.7-17.5            

     



             410)                                                

 

             HEMATOCRIT (BEAKER) (test code = 44.9 %       40.1-51.0            

     



             411)                                                

 

             MEAN CORPUSCULAR VOLUME (BEAKER) 85.0 fL      79.0-92.2            

     



             (test code = 753)                                        

 

             MEAN CORPUSCULAR HEMOGLOBIN 27.7 pg      25.7-32.2                 



             (BEAKER) (test code = 751)                                        

 

             MEAN CORPUSCULAR HEMOGLOBIN CONC 32.5 GM/DL   32.3-36.5            

     



             (BEAKER) (test code = 752)                                        

 

             RED CELL DISTRIBUTION WIDTH 15.3 %       11.6-14.4    H            



             (BEAKER) (test code = 412)                                        

 

             PLATELET COUNT (BEAKER) (test 300 K/CU MM  150-450                 

  



             code = 756)                                         

 

             MEAN PLATELET VOLUME (BEAKER) 10.4 fL      9.4-12.4                

  



             (test code = 754)                                        

 

             NUCLEATED RED BLOOD CELLS 0 /100 WBC   0-0                       



             (BEAKER) (test code = 413)                                        



(MANUAL DIFFERENTIAL)2020 08:48:00





             Test Item    Value        Reference Range Interpretation Comments

 

             NEUTROPHILS - REL (DIFF) (BEAKER) 69 %                             

      



             (test code = 1359)                                        

 

             LYMPHOCYTES - REL (DIFF) (BEAKER) 25 %                             

      



             (test code = 1360)                                        

 

             MONOCYTES - REL (DIFF) (BEAKER) 3 %                                

    



             (test code = 1361)                                        

 

             EOSINOPHILS - REL (DIFF) (BEAKER) 3 %                              

      



             (test code = 1362)                                        

 

             BASOPHILS - REL (DIFF) (BEAKER) 0 %                                

    



             (test code = 1363)                                        

 

             NEUTROPHILS - ABS (DIFF) (BEAKER) 4.62 K/ L    1.80-8.00           

      



             (test code = 1365)                                        

 

             LYMPHOCYTES - ABS (DIFF) (BEAKER) 1.68 K/ L    1.48-4.50           

      



             (test code = 1366)                                        

 

             MONOCYTES - ABS (DIFF) (BEAKER) 0.20 K/ L    0.00-1.30             

    



             (test code = 1367)                                        

 

             EOSINOPHILS - ABS (DIFF) (BEAKER) 0.20 K/ L    0.00-0.50           

      



             (test code = 1368)                                        

 

             BASOPHILS - ABS (DIFF) (BEAKER) 0.00 K/ L    0.00-0.20             

    



             (test code = 1369)                                        

 

             TOTAL COUNTED (BEAKER) (test code = 100                            

        



             1351)                                               

 

             PLT MORPHOLOGY (BEAKER) (test code Normal                          

       



             = 486)                                              

 

             RBC MORPHOLOGY (BEAKER) (test code Normal                          

       



             = 762)                                              

 

             SMUDGE CELLS (BEAKER) (test code = Present                         

       



             1371)                                               



HEMOGLOBIN [id number] 06:39:00





             Test Item    Value        Reference Range Interpretation Comments

 

             HEMOGLOBIN A1C (BEAKER) (test code = 10.5 %       4.3-6.1      H   

         



             368)                                                



LIPID [id number] 04:57:00





             Test Item    Value        Reference Range Interpretation Comments

 

             TRIGLYCERIDES (BEAKER) 1251 mg/dL                             Speci

men moderately



             (test code = 540)                                        hemolyzed

 

             CHOLESTEROL (BEAKER) 215 mg/dL                              Specime

n moderately



             (test code = 631)                                        hemolyzed

 

             HDL CHOLESTEROL 22 mg/dL                               



             (BEAKER) (test code =                                        



             976)                                                



Calculated LDL not valid if triglyceride &gt;400 mg/dLTriglyceride Reference 
Range: Low Risk &lt;150Borderline 150-199  High Risk 200-499 Very High Risk 
&gt;=500Cholesterol Reference Range: Low Risk &lt;200 Borderline 200-239 High 
Risk &gt;240HDL Cholesterol Reference Range: Low Risk &gt;=60 High Risk 
&lt;40LDL Cholesterol Reference Range: Optimal &lt;100 Near Optimal 100-129 
Borderline 130-159 Nbkh661-458 Very High &gt;=190 Specimen moderately lipemic
BASIC METABOLIC [id number] 04:56:00





             Test Item    Value        Reference Range Interpretation Comments

 

             SODIUM (BEAKER) 137 meq/L    136-145                   



             (test code = 381)                                        

 

             POTASSIUM (BEAKER) 4.6 meq/L    3.5-5.1                   Specimen 

moderately



             (test code = 379)                                        hemolyzed

 

             CHLORIDE (BEAKER) 106 meq/L                        



             (test code = 382)                                        

 

             CO2 (BEAKER) (test 20 meq/L     22-29        L            



             code = 355)                                         

 

             BLOOD UREA NITROGEN 12 mg/dL     7-21                      



             (BEAKER) (test code                                        



             = 354)                                              

 

             CREATININE (BEAKER) 0.95 mg/dL   0.57-1.25                 Specimen

 moderately



             (test code = 358)                                        hemolyzed

 

             GLUCOSE RANDOM 398 mg/dL           H            



             (BEAKER) (test code                                        



             = 652)                                              

 

             CALCIUM (BEAKER) 8.8 mg/dL    8.4-10.2                  



             (test code = 697)                                        

 

             EGFR (BEAKER) (test 110 mL/min/1.73                           ESTIM

ATED GFR IS



             code = 1092) sq m                                   NOT AS ACCURATE

 AS



                                                                 CREATININE



                                                                 CLEARANCE IN



                                                                 PREDICTING



                                                                 GLOMERULAR



                                                                 FILTRATION RATE

.



                                                                 ESTIMATED GFR I

S



                                                                 NOT APPLICABLE 

FOR



                                                                 DIALYSIS PATIEN

TS.



POCT-GLUCOSE [id number] 21:53:00





             Test Item    Value        Reference Range Interpretation Comments

 

             POC-GLUCOSE METER > mg/dL             HH           : Notified

 RN/MD: TESTED



             (BEAKER) (test code =                                        AT [facility] [address] [facility]



             1538)                                               [facility], [address]

[address]:



                                                                 /Techni

christina ID =



                                                                 [id number] for ZECHARIAH TRACY



U/S, ABDOMINAL, [id number] 19:54:00Reason for exam:-&gt;Evaluation of  
shunt and for possible cyst or pseudocyst Should this be performed at the 
bedside?-&gt;YesFINAL REPORT PATIENT ID: [id number] Limited abdominal ultrasound. 
CLINICAL HISTORY: Evaluation of  shunt for possible cyst or pseudocyst. 
COMPARISON STUDY: None available. FINDINGS: Sonographic assessment of the 
abdomen was performed assessing for fluid in the region of the patient's shunts.
No fluid collections are seen. However, the study is limited by the patient's 
body habitus. CT scan would be more sensitive. Signed: Brandyn Grewaleport 
Verified Date/Time: 2020 19:54:10 Reading Location: [facility] B1 C013W Consult 
Reading Room Electronically signed by: BRANDYN GREWAL M.D. on 2020 07:54 
PMPOCT-GLUCOSE [id number] 19:34:00





             Test Item    Value        Reference Range Interpretation Comments

 

             POC-GLUCOSE METER 474 mg/dL           HH           : Notified

 RN/MD:



             (BEAKER) (test code =                                        TESTED

 AT [facility] [address] [address])                                               [facility],



                                                                 [address]:



                                                                 /Techni

christina ID



                                                                 = [id number] for LONA URIOSTEGUI



URINALYSIS W/ REFLEX URINE [id number] 17:48:00





             Test Item    Value        Reference Range Interpretation Comments

 

             COLOR (BEAKER) (test code = 470) Yellow                            

     

 

             CLARITY (BEAKER) (test code = Hazy                                 

  



             469)                                                

 

             SPECIFIC GRAVITY UA (BEAKER) 1.020        1.001-1.035              

 



             (test code = 468)                                        

 

             PH UA (BEAKER) (test code = 467) 6.0          5.0-8.0              

     

 

             PROTEIN UA (BEAKER) (test code = 20 mg/dL     Negative     A       

     



             464)                                                

 

             GLUCOSE UA (BEAKER) (test code = >1000 mg/dL  Negative     A       

     



             365)                                                

 

             KETONES UA (BEAKER) (test code = 40 mg/dL     Negative     A       

     



             371)                                                

 

             BILIRUBIN UA (BEAKER) (test code Negative     Negative             

     



             = 462)                                              

 

             BLOOD UA (BEAKER) (test code = Moderate     Negative     A         

   



             461)                                                

 

             NITRITE UA (BEAKER) (test code = Positive     Negative     A       

     



             465)                                                

 

             LEUKOCYTE ESTERASE UA (BEAKER) Large        Negative     A         

   



             (test code = 466)                                        

 

             UROBILINOGEN UA (BEAKER) (test 0.2 mg/dL    0.2-1.0                

   



             code = 463)                                         

 

             RBC UA (BEAKER) (test code = 519) 0 /HPF                           

      

 

             WBC UA (BEAKER) (test code = 520) 267 /HPF                         

      

 

             BACTERIA (BEAKER) (test code = Moderate                            

   



             517)                                                

 

             SOURCE(BEAKER) (test code = 2797)                                  

      



CBC W/PLT COUNT &amp; AUTO [id number] 17:38:00





             Test Item    Value        Reference Range Interpretation Comments

 

             WHITE BLOOD CELL COUNT (BEAKER) 7.8 K/ L     3.5-10.5              

    



             (test code = 775)                                        

 

             RED BLOOD CELL COUNT (BEAKER) 5.12 M/ L    4.63-6.08               

  



             (test code = 761)                                        

 

             HEMOGLOBIN (BEAKER) (test code = 14.7 GM/DL   13.7-17.5            

     



             410)                                                

 

             HEMATOCRIT (BEAKER) (test code = 43.3 %       40.1-51.0            

     



             411)                                                

 

             MEAN CORPUSCULAR VOLUME (BEAKER) 84.6 fL      79.0-92.2            

     



             (test code = 753)                                        

 

             MEAN CORPUSCULAR HEMOGLOBIN 28.7 pg      25.7-32.2                 



             (BEAKER) (test code = 751)                                        

 

             MEAN CORPUSCULAR HEMOGLOBIN CONC 33.9 GM/DL   32.3-36.5            

     



             (BEAKER) (test code = 752)                                        

 

             RED CELL DISTRIBUTION WIDTH 15.3 %       11.6-14.4    H            



             (BEAKER) (test code = 412)                                        

 

             PLATELET COUNT (BEAKER) (test 344 K/CU MM  150-450                 

  



             code = 756)                                         

 

             MEAN PLATELET VOLUME (BEAKER) 11.0 fL      9.4-12.4                

  



             (test code = 754)                                        

 

             NUCLEATED RED BLOOD CELLS 0 /100 WBC   0-0                       



             (BEAKER) (test code = 413)                                        



(CELLAVISION MANUAL DIFF)2020 17:38:00





             Test Item    Value        Reference Range Interpretation Comments

 

             NEUTROPHILS - REL 63 %                                   



             (CELLAVISION)(BEAKER) (test code                                   

     



             = 2816)                                             

 

             LYMPHOCYTES - REL 23 %                                   



             (CELLAVISION)(BEAKER) (test code                                   

     



             = 2817)                                             

 

             MONOCYTES - REL 6 %                                    



             (CELLAVISION)(BEAKER) (test code                                   

     



             = 2818)                                             

 

             EOSINOPHILS - REL 5 %                                    



             (CELLAVISION)(BEAKER) (test code                                   

     



             = 2819)                                             

 

             BASOPHILS - REL 1 %                                    



             (CELLAVISION)(BEAKER) (test code                                   

     



             = 2820)                                             

 

             BANDS - REL (CELLAVISION)(BEAKER) 2 %          0-10                

      



             (test code = 2826)                                        

 

             NEUTROPHILS - ABS 4.91 K/ul    1.78-5.38                 



             (CELLAVISION)(BEAKER) (test code                                   

     



             = 2830)                                             

 

             LYMPHOCYTES - ABS 1.79 K/ul    1.32-3.57                 



             (CELLAVISION)(BEAKER) (test code                                   

     



             = 2831)                                             

 

             MONOCYTES - ABS 0.47 K/uL    0.30-0.82                 



             (CELLAVISION)(BEAKER) (test code                                   

     



             = 2832)                                             

 

             EOSINOPHILS - ABS 0.39 K/uL    0.04-0.54                 



             (CELLAVISION)(BEAKER) (test code                                   

     



             = 2834)                                             

 

             BASOPHILS - ABS 0.08 K/uL    0.01-0.08                 



             (CELLAVISION)(BEAKER) (test code                                   

     



             = 2835)                                             

 

             BANDS - ABS (CELLAVISION)(BEAKER) 0.16 K/uL    0.00-0.80           

      



             (test code = 2840)                                        

 

             TOTAL COUNTED (BEAKER) (test code 100                              

      



             = 1351)                                             

 

             SMUDGE CELLS (BEAKER) (test code Present                           

     



             = 1371)                                             

 

             GIANT PLATELETS (BEAKER) (test Present                             

   



             code = 313)                                         

 

             ANISOCYTOSIS (BEAKER) (test code 1+ few                            

     



             = 961)                                              

 

             POIKILOCYTES (BEAKER) (test code 2+ moderate                       

     



             = 966)                                              

 

             SPHEROCYTES (BEAKER) (test code = 1+ few                           

      



             768)                                                

 

             OVALOCYTES (BEAKER) (test code = 1+ few                            

     



             477)                                                

 

             TEAR DROP CELLS (BEAKER) (test 1+ few                              

   



             code = 481)                                         

 

             ARTIFACT (CELLAVISION)(BEAKER) Present                             

   



             (test code = 3432)                                        

 

             PLATELET CONCENTRATION Adequate                               



             (CELLAVISION)(BEAKER) (test code                                   

     



             = 3438)                                             



Received comment: User comments: Slide comments:POCT-GLUCOSE [id number] 
16:27:00





             Test Item    Value        Reference Range Interpretation Comments

 

             POC-GLUCOSE METER 424 mg/dL           HH           : Notified

 RN/MD:



             (BEAKER) (test code =                                        TESTED

 AT [facility] [address]



             1538)                                               [facility],



                                                                 [id number]:



                                                                 /Techni

christina ID



                                                                 = [id number] for LONA URIOSTEGUI



CT, BRAIN, WITHOUT [id number] 15:31:00FINAL REPORT PATIENT ID: [id number] 
CT, BRAIN, WITHOUT CONTRAST CLINICAL INDICATION: Hydrocephalus COMPARISON: None 
TECHNIQUE: Noncontrast axial CT imaging of the brain and skull. DOSE REDUCTION: 
Dose modulation, iterative reconstruction, and/or weight-based adjustment of the
mA/kV was utilized to reduce the radiation dose to as low as reasonably 
achievable. FINDINGS:A total of two ventricular drainagecatheters are present. A
right transverse temporal catheter terminates across the midline just abovethe 
level of the foramen of Monro. A right parietal approach catheter terminates in 
the pineal region. Supratentorial ventricular configuration is slitlike. There 
is no herniation. The basilar cisternsare preserved. There is no identifiable 
recent infarct. 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 
partial callosal agenesis. Calvarial configuration escape of cephalic. There is 
no acute osseous abnormality. IMPRESSION: Chronic, likely congenital parenchymal
changes without recent infarct or hemorrhage. A total of two ventricular 
drainage catheters are present. Supratentorial ventricular configuration is 
slitlike and may represent over shunting. Correlation recommended. Signed:
JR Rae Robert MDReport Verified Date/Time: 2020 15:31:08 
Reading Location: [facility] C013V Neuro Reading Room Electronically signed by: 
ALONDRA RAE on 2020 03:31 PMRAD, SHUNT [id number] 
15:13:00Reason for exam:-&gt;concern for VPS malfunctionFINAL REPORT PATIENT ID:
[id number] RAD, SHUNT SERIES INDICATION: concern for VPS malfunction COMPARISON: 
None TECHNIQUE: AP and lateral radiographs of the skull, abdomen and chest were 
acquired to evaluate shunt catheter FINDINGS:An abandoned shunt catheter is 
present within the right neck. Medial to this a second shunt catheter is present
which descends along the left chest wall, terminating within the mid pelvis. 
Radiopaque portions of the catheter appear contiguous. Signed: Colby Reyes Verified Date/Time: 2020 15:13:54 Reading Location: [facility]
Radiology Reading Room Electronically signed by: COLBY REYES MD on
2020 03:13 PMPOCT-GLUCOSE [id number] 11:38:00





             Test Item    Value        Reference Range Interpretation Comments

 

             POC-GLUCOSE METER 394 mg/dL           H            : TESTED A

T [facility] [address]



             (Playrcart) (test code =                                        [place]

[facility],



             1538)                                               [id number]:



                                                                 /Techni

christina ID



                                                                 = [id number] for LONA URIOSTEGUI



HEMOGLOBIN [id number] 09:15:00





             Test Item    Value        Reference Range Interpretation Comments

 

             HEMOGLOBIN A1C (Playrcart) (test code = 10.4 %       4.3-6.1      H   

         



             368)                                                



TSH/FREE T4 IF [id number] 07:54:00





             Test Item    Value        Reference Range Interpretation Comments

 

             THYROID STIMULATING HORMONE 1.40 uIU/mL  0.35-4.94                 



             (KUN) (test code = 772)                                        



POCT-GLUCOSE [id number] 07:48:00





             Test Item    Value        Reference Range Interpretation Comments

 

             POC-GLUCOSE METER > mg/dL             HH           : Notified

 RN/MD: TESTED



             (BEAKER) (test code =                                        AT [facility]

 [address]



             [address])                                               [place], [address]

[address]:



                                                                 /Techni

christina ID =



                                                                 [id number] for LONA GOLDSMITH



BASIC METABOLIC [id number] 07:44:00





             Test Item    Value        Reference Range Interpretation Comments

 

             SODIUM (BEAKER) 134 meq/L    136-145      L            



             (test code = 381)                                        

 

             POTASSIUM (BEAKER) 4.4 meq/L    3.5-5.1                   



             (test code = 379)                                        

 

             CHLORIDE (BEAKER) 103 meq/L                        



             (test code = 382)                                        

 

             CO2 (BEAKER) (test 20 meq/L     22-29        L            



             code = 355)                                         

 

             BLOOD UREA NITROGEN 17 mg/dL     7-21                      



             (BEAKER) (test code                                        



             = 354)                                              

 

             CREATININE (BEAKER) 1.09 mg/dL   0.57-1.25                 



             (test code = 358)                                        

 

             GLUCOSE RANDOM 464 mg/dL           HH           



             (BEAKER) (test code                                        



             = 652)                                              

 

             CALCIUM (BEAKER) 8.6 mg/dL    8.4-10.2                  



             (test code = 697)                                        

 

             EGFR (BEAKER) (test 94 mL/min/1.73                           ESTIMA

HUMA GFR IS



             code = 1092) sq m                                   NOT AS ACCURATE

 AS



                                                                 CREATININE



                                                                 CLEARANCE IN



                                                                 PREDICTING



                                                                 GLOMERULAR



                                                                 FILTRATION RATE

.



                                                                 ESTIMATED GFR I

S



                                                                 NOT APPLICABLE 

FOR



                                                                 DIALYSIS PATIEN

TS.



LIPID [id number] 07:34:00





             Test Item    Value        Reference Range Interpretation Comments

 

             TRIGLYCERIDES (BEAKER) (test code = 750 mg/dL                      

        



             540)                                                

 

             CHOLESTEROL (BEAKER) (test code = 196 mg/dL                        

      



             631)                                                

 

             HDL CHOLESTEROL (BEAKER) (test code 22 mg/dL                       

        



             = 976)                                              



Calculated LDL not valid if triglyceride &gt;400 mg/dLTriglyceride Reference 
Range: Low Risk &lt;150Borderline 150-199 High Risk 200-499 Very High Risk 
&gt;=500Cholesterol Reference Range: Low Risk &lt;200 Borderline 200-239 High 
Risk &gt;240HDL Cholesterol Reference Range: Low Risk &gt;=60 High Risk&lt;40LDL
Cholesterol Reference Range: Optimal &lt;100 Near Optimal 100-129 Borderline 
130-159 [id number] Very High &gt;=190POCT-GLUCOSE [id number] 00:49:00





             Test Item    Value        Reference Range Interpretation Comments

 

             POC-GLUCOSE METER 399 mg/dL           H            : TESTED A

T [facility] [address]



             (KUN) (test code =                                        [place],



             1538)                                               [id number]:



                                                                 /Techni

christina ID



                                                                 = [id number] for CLAY BATRES



RAD, FOOT, 2 VIEWS, [id number] 22:28:00Reason for exam:-&gt;osteomyletitis
FINAL REPORT PATIENT ID: [id number] TECHNIQUE: Two views each of the bilateral 
feet HISTORY: osteomyletitis. COMPARISON: None. IMPRESSION:No acute displaced 
fracture or dislocation. Joint spaces are within normal limits.Severe soft 
tissue swelling.On the right subcentimeter nonspecific calcifications adjacent 
to the heel plantar aspect. Correlate with physical exam. Severely limited exam 
due to patientbody habitus. No definite cortical irregularity.There is clinical 
concern for osteomyelitis, recommend MRI with contrast. Signed: Patricia Valeraort Verified Date/Time: 2020 22:28:25 Reading Location: [address] 
Consult Reading Room Electronically signed by: PATRICIA VALERA DO on
2020 10:28 PMRAD, FOOT, 2 VIEWS, [id number] 22:28:00Reason for 
exam:-&gt;osteomyletitsFINAL REPORT PATIENT ID: [id number] TECHNIQUE: Two views 
each of the bilateral feet HISTORY: osteomyletitis. COMPARISON: None. 
IMPRESSION:No acute displaced fracture or dislocation. Joint spaces are within 
normal limits.Severe soft tissue swelling.On the right subcentimeter nonspecific
calcifications adjacent to the heel plantar aspect. Correlate with physical 
exam. Severely limited exam due to patientbody habitus. No definite cortical 
irregularity.There is clinical concern for osteomyelitis, recommend MRI with 
contrast. Signed: Patricia Valera MDRkayeort Verified Date/Time: 2020 22:28:25
Reading Location: [facility] C013 Consult Reading Room Electronically signed by: 
PATRICIA VALERA DO  10:28 PMPOCT-GLUCOSE [id number] 
21:04:00





             Test Item    Value        Reference Range Interpretation Comments

 

             POC-GLUCOSE METER 430 mg/dL           HH           : Notified

 RN/MD:



             (KUN) (test code =                                        TESTED

 AT [facility] [address] [id number])                                               [place] [facility],



                                                                 [address]:



                                                                 /Techni

christina ID



                                                                 = [id number] for DEYSI ADRIAN



ERTAPENEM:SUSC:PT:ISOLATE:ORDQN:[id number] 16:48:00





             Test Item    Value        Reference Range Interpretation Comments

 

             Culture: Urine (test >100,000 CFU/mL Proteus                       

    



             code = Culture: mirabilis 10,000 -                           



             Urine)       50,000 CFU/mL Skin Jaime                           



[facility]PT:ISOLATE:ORDQN:[id number] 16:48:00





             Test Item    Value        Reference Range Interpretation Comments

 

             Proteus mirabilis (test Proteus mirabilis                          

 



             code = Proteus mirabilis)                                        



[facility] AND [id number] 16:48:00





             Test Item    Value        Reference Range Interpretation Comments

 

             UA Nitrite (test code Negative (18 10:48                      

     



             = UA Nitrite) AM)                                    



[facility] AND [id number] 16:48:00





             Test Item    Value        Reference Range Interpretation Comments

 

             UA Bili (test code = Negative *NA*(18                         

  



             UA Bili)     10:48 AM)                              



[facility] AND [id number] 16:48:00





             Test Item    Value        Reference Range Interpretation Comments

 

             UA Ketones (test code Negative *NA*(18                        

   



             = UA Ketones) 10:48 AM)                              



[facility] AND [id number] 16:48:00





             Test Item    Value        Reference Range Interpretation Comments

 

             UA Blood (test code = Trace *ABN*(18                          

 



             UA Blood)    10:48 AM)                              



[facility] [id number] 16:48:00





             Test Item    Value        Reference Range Interpretation Comments

 

             UA Urobilinogen (test code = UA 0.2          0.1-1.0               

    



             Urobilinogen)                                        



[facility] [id number] 16:48:00





             Test Item    Value        Reference Range Interpretation Comments

 

             UA Leuk Est (test code Large *ABN*(18                         

  



             = UA Leuk Est) 10:48 AM)                              



[facility] AND [id number] 16:48:00





             Test Item    Value        Reference Range Interpretation Comments

 

             UA Protein (test code Negative (18 10:48                      

     



             = UA Protein) AM)                                    



[facility] AND [id number] 16:48:00





             Test Item    Value        Reference Range Interpretation Comments

 

             UA Glucose (test code Negative (18 10:48                      

     



             = UA Glucose) AM)                                    



[facility] AND [id number] 16:48:00





             Test Item    Value        Reference Range Interpretation Comments

 

             UA pH (test code = UA pH) 7.0 1        5.0-8.0                   



[facility] AND [id number] 16:48:00





             Test Item    Value        Reference Range Interpretation Comments

 

             UA Spec Grav (test code = UA Spec 1.015 1                          

      



             Grav)                                               



[facility] AND [id number] 16:48:00





             Test Item    Value        Reference Range Interpretation Comments

 

             UA Color (test code = Yellow *NA*(18                          

 



             UA Color)    10:48 AM)                              



[facility] AND [id number] 16:48:00





             Test Item    Value        Reference Range Interpretation Comments

 

             UA Turbidity (test code = Clear (18 10:48                     

      



             UA Turbidity) AM)                                    



[facility] AND [id number] 16:48:00





             Test Item    Value        Reference Range Interpretation Comments

 

             UA Mucus (test code = UA Mucus) Few /LPF                           

    



[facility] AND [id number] 16:48:00





             Test Item    Value        Reference Range Interpretation Comments

 

             UA Bacteria (test code = UA Few /HPF                               



             Bacteria)                                           



[facility] AND [id number] 16:48:00





             Test Item    Value        Reference Range Interpretation Comments

 

             UA RBC (test code = 0-2 /HPF     See_Comment                [Automa

huma message] The



             UA RBC)                                             system which ge

nerated



                                                                 this result tra

nsmitted



                                                                 reference range

: <=2. The



                                                                 reference range

 was not



                                                                 used to interpr

et this



                                                                 result as zayda

l/abnormal.



[facility] AND [id number] 16:48:00





             Test Item    Value        Reference Range Interpretation Comments

 

             UA Sq Epi (test code = None Seen (18                          

 



             UA Sq Epi)   10:48 AM)                              



[facility] AND [id number] 16:48:00





             Test Item    Value        Reference Range Interpretation Comments

 

             UA WBC (test code = UA WBC)  /HPF                            



[facility]PT:ISOLATE:ORDQN:[id number] 16:48:00





             Test Item    Value        Reference Range Interpretation Comments

 

             Culture: Urine (test >100,000 CFU/mL Proteus                       

    



             code = Culture: mirabilis 10,000 -                           



             Urine)       50,000 CFU/mL Skin Jaime                           



[facility]PT:ISOLATE:ORDQN:[id number] 16:48:00





             Test Item    Value        Reference Range Interpretation Comments

 

             Proteus mirabilis (test Proteus mirabilis                          

 



             code = Proteus mirabilis)                                        



[facility] AND [id number] 16:48:00





             Test Item    Value        Reference Range Interpretation Comments

 

             UA Nitrite (test code Negative (18 10:48                      

     



             = UA Nitrite) AM)                                    



[facility] AND [id number] 16:48:00





             Test Item    Value        Reference Range Interpretation Comments

 

             UA Bili (test code = Negative *NA*(18                         

  



             UA Bili)     10:48 AM)                              



[facility] AND [id number] 16:48:00





             Test Item    Value        Reference Range Interpretation Comments

 

             UA Ketones (test code Negative *NA*(18                        

   



             = UA Ketones) 10:48 AM)                              



[facility] AND [id number] 16:48:00





             Test Item    Value        Reference Range Interpretation Comments

 

             UA Blood (test code = Trace *ABN*(18                          

 



             UA Blood)    10:48 AM)                              



[facility] AND [id number] 16:48:00





             Test Item    Value        Reference Range Interpretation Comments

 

             UA Urobilinogen (test code = UA 0.2          0.1-1.0               

    



             Urobilinogen)                                        



[facility] AND [id number] 16:48:00





             Test Item    Value        Reference Range Interpretation Comments

 

             UA Leuk Est (test code Large *ABN*(18                         

  



             = UA Leuk Est) 10:48 AM)                              



[facility] AND [id number] 16:48:00





             Test Item    Value        Reference Range Interpretation Comments

 

             UA Protein (test code Negative (18 10:48                      

     



             = UA Protein) AM)                                    



[facility] AND [id number] 16:48:00





             Test Item    Value        Reference Range Interpretation Comments

 

             UA Glucose (test code Negative (18 10:48                      

     



             = UA Glucose) AM)                                    



[facility] AND [id number] 16:48:00





             Test Item    Value        Reference Range Interpretation Comments

 

             UA pH (test code = UA pH) 7.0 1        5.0-8.0                   



[facility] AND [id number] 16:48:00





             Test Item    Value        Reference Range Interpretation Comments

 

             UA Spec Grav (test code = UA Spec 1.015 1                          

      



             Grav)                                               



[facility] AND [id number] 16:48:00





             Test Item    Value        Reference Range Interpretation Comments

 

             UA Color (test code = Yellow *NA*(18                          

 



             UA Color)    10:48 AM)                              



[facility] AND [id number] 16:48:00





             Test Item    Value        Reference Range Interpretation Comments

 

             UA Turbidity (test code = Clear (18 10:48                     

      



             UA Turbidity) AM)                                    



[facility] AND [id number] 16:48:00





             Test Item    Value        Reference Range Interpretation Comments

 

             UA Mucus (test code = UA Mucus) Few /LPF                           

    



[facility] AND [id number] 16:48:00





             Test Item    Value        Reference Range Interpretation Comments

 

             UA Bacteria (test code = UA Few /HPF                               



             Bacteria)                                           



[facility] AND [id number] 16:48:00





             Test Item    Value        Reference Range Interpretation Comments

 

             UA RBC (test code = 0-2 /HPF     See_Comment                [Automa

huma message] The



             UA RBC)                                             system which ge

nerated



                                                                 this result tra

nsmitted



                                                                 reference range

: <=2. The



                                                                 reference range

 was not



                                                                 used to interpr

et this



                                                                 result as zayda

l/abnormal.



[facility] AND [id number] 16:48:00





             Test Item    Value        Reference Range Interpretation Comments

 

             UA Sq Epi (test code = None Seen (18                          

 



             UA Sq Epi)   10:48 AM)                              



[facility] [id number] 16:48:00





             Test Item    Value        Reference Range Interpretation Comments

 

             UA WBC (test code = UA WBC)  /HPF                            



[facility]ORDQN:[id number] 16:48:00





             Test Item    Value        Reference Range Interpretation Comments

 

             Culture: Urine (test >100,000 CFU/mL Proteus                       

    



             code = Culture: mirabilis 10,000 -                           



             Urine)       50,000 CFU/mL Skin Jaime                           



[facility]PT:ISOLATE:ORDQN:[id number] 16:48:00





             Test Item    Value        Reference Range Interpretation Comments

 

             Proteus mirabilis (test Proteus mirabilis                          

 



             code = Proteus mirabilis)                                        



[facility] [id number] 16:48:00





             Test Item    Value        Reference Range Interpretation Comments

 

             UA Nitrite (test code Negative (18 10:48                      

     



             = UA Nitrite) AM)                                    



[facility] [id number] 16:48:00





             Test Item    Value        Reference Range Interpretation Comments

 

             UA Bili (test code = Negative *NA*(18                         

  



             UA Bili)     10:48 AM)                              



[facility] [id number] 16:48:00





             Test Item    Value        Reference Range Interpretation Comments

 

             UA Ketones (test code Negative *NA*(18                        

   



             = UA Ketones) 10:48 AM)                              



[facility] [id number] 16:48:00





             Test Item    Value        Reference Range Interpretation Comments

 

             UA Blood (test code = Trace *ABN*(18                          

 



             UA Blood)    10:48 AM)                              



[facility] [id number] 16:48:00





             Test Item    Value        Reference Range Interpretation Comments

 

             UA Urobilinogen (test code = UA 0.2          0.1-1.0               

    



             Urobilinogen)                                        



[facility] [id number] 16:48:00





             Test Item    Value        Reference Range Interpretation Comments

 

             UA Leuk Est (test code Large *ABN*(18                         

  



             = UA Leuk Est) 10:48 AM)                              



[facility] [id number] 16:48:00





             Test Item    Value        Reference Range Interpretation Comments

 

             UA Protein (test code Negative (18 10:48                      

     



             = UA Protein) AM)                                    



[facility] [id number] 16:48:00





             Test Item    Value        Reference Range Interpretation Comments

 

             UA Glucose (test code Negative (18 10:48                      

     



             = UA Glucose) AM)                                    



[facility] [id number] 16:48:00





             Test Item    Value        Reference Range Interpretation Comments

 

             UA pH (test code = UA pH) 7.0 1        5.0-8.0                   



[facility] [id number] 16:48:00





             Test Item    Value        Reference Range Interpretation Comments

 

             UA Spec Grav (test code = UA Spec 1.015 1                          

      



             Grav)                                               



[facility] [id number] 16:48:00





             Test Item    Value        Reference Range Interpretation Comments

 

             UA Color (test code = Yellow *NA*(18                          

 



             UA Color)    10:48 AM)                              



[facility] [id number] 16:48:00





             Test Item    Value        Reference Range Interpretation Comments

 

             UA Turbidity (test code = Clear (18 10:48                     

      



             UA Turbidity) AM)                                    



[facility] [id number] 16:48:00





             Test Item    Value        Reference Range Interpretation Comments

 

             UA Mucus (test code = UA Mucus) Few /LPF                           

    



[facility] [id number] 16:48:00





             Test Item    Value        Reference Range Interpretation Comments

 

             UA Bacteria (test code = UA Few /HPF                               



             Bacteria)                                           



[facility] [id number] 16:48:00





             Test Item    Value        Reference Range Interpretation Comments

 

             UA RBC (test code = 0-2 /HPF     See_Comment                [Automa

huma message] The



             UA RBC)                                             system which ge

nerated



                                                                 this result tra

nsmitted



                                                                 reference range

: <=2. The



                                                                 reference range

 was not



                                                                 used to interpr

et this



                                                                 result as zayda

l/abnormal.



[facility] AND [id number] 16:48:00





             Test Item    Value        Reference Range Interpretation Comments

 

             UA Sq Epi (test code = None Seen (18                          

 



             UA Sq Epi)   10:48 AM)                              



[facility] [id number] 16:48:00





             Test Item    Value        Reference Range Interpretation Comments

 

             UA WBC (test code = UA WBC)  /HPF                            



[facility]ORDQN:[id number] 16:48:00





             Test Item    Value        Reference Range Interpretation Comments

 

             Culture: Urine (test >100,000 CFU/mL Proteus                       

    



             code = Culture: mirabilis 10,000 -                           



             Urine)       50,000 CFU/mL Skin Jaime                           



[facility]:ORDQN:[id number] 16:48:00





             Test Item    Value        Reference Range Interpretation Comments

 

             Proteus mirabilis (test Proteus mirabilis                          

 



             code = Proteus mirabilis)                                        



[facility] [id number] 16:48:00





             Test Item    Value        Reference Range Interpretation Comments

 

             UA Nitrite (test code Negative (18 10:48                      

     



             = UA Nitrite) AM)                                    



[facility] [id number] 16:48:00





             Test Item    Value        Reference Range Interpretation Comments

 

             UA Bili (test code = Negative *NA*(18                         

  



             UA Bili)     10:48 AM)                              



[facility] AND [id number] 16:48:00





             Test Item    Value        Reference Range Interpretation Comments

 

             UA Ketones (test code Negative *NA*(18                        

   



             = UA Ketones) 10:48 AM)                              



[facility] [id number] 16:48:00





             Test Item    Value        Reference Range Interpretation Comments

 

             UA Blood (test code = Trace *ABN*(18                          

 



             UA Blood)    10:48 AM)                              



[facility] [id number] 16:48:00





             Test Item    Value        Reference Range Interpretation Comments

 

             UA Urobilinogen (test code = UA 0.2          0.1-1.0               

    



             Urobilinogen)                                        



[facility] [id number] 16:48:00





             Test Item    Value        Reference Range Interpretation Comments

 

             UA Leuk Est (test code Large *ABN*(18                         

  



             = UA Leuk Est) 10:48 AM)                              



[facility] [id number] 16:48:00





             Test Item    Value        Reference Range Interpretation Comments

 

             UA Protein (test code Negative (18 10:48                      

     



             = UA Protein) AM)                                    



[facility] [id number] 16:48:00





             Test Item    Value        Reference Range Interpretation Comments

 

             UA Glucose (test code Negative (18 10:48                      

     



             = UA Glucose) AM)                                    



[facility] AND [id number] 16:48:00





             Test Item    Value        Reference Range Interpretation Comments

 

             UA pH (test code = UA pH) 7.0 1        5.0-8.0                   



[facility] [id number] 16:48:00





             Test Item    Value        Reference Range Interpretation Comments

 

             UA Spec Grav (test code = UA Spec 1.015 1                          

      



             Grav)                                               



[facility] AND [id number] 16:48:00





             Test Item    Value        Reference Range Interpretation Comments

 

             UA Color (test code = Yellow *NA*(18                          

 



             UA Color)    10:48 AM)                              



[facility] [id number] 16:48:00





             Test Item    Value        Reference Range Interpretation Comments

 

             UA Turbidity (test code = Clear (18 10:48                     

      



             UA Turbidity) AM)                                    



[facility] [id number] 16:48:00





             Test Item    Value        Reference Range Interpretation Comments

 

             UA Mucus (test code = UA Mucus) Few /LPF                           

    



[facility] AND [id number] 16:48:00





             Test Item    Value        Reference Range Interpretation Comments

 

             UA Bacteria (test code = UA Few /HPF                               



             Bacteria)                                           



[facility] AND [id number] 16:48:00





             Test Item    Value        Reference Range Interpretation Comments

 

             UA RBC (test code = 0-2 /HPF     See_Comment                [Automa

huma message] The



             UA RBC)                                             system which ge

nerated



                                                                 this result tra

nsmitted



                                                                 reference range

: <=2. The



                                                                 reference range

 was not



                                                                 used to interpr

et this



                                                                 result as zayda

l/abnormal.



[facility] AND [id number] 16:48:00





             Test Item    Value        Reference Range Interpretation Comments

 

             UA Sq Epi (test code = None Seen (18                          

 



             UA Sq Epi)   10:48 AM)                              



[facility] [id number] 16:48:00





             Test Item    Value        Reference Range Interpretation Comments

 

             UA WBC (test code = UA WBC)  /HPF                            



[facility]ORD[facility]:[id number] 16:48:00





             Test Item    Value        Reference Range Interpretation Comments

 

             Culture: Urine (test >100,000 CFU/mL Proteus                       

    



             code = Culture: mirabilis 10,000 -                           



             Urine)       50,000 CFU/mL Skin Jaime                           



[facility]:ORDQN:[id number] 16:48:00





             Test Item    Value        Reference Range Interpretation Comments

 

             Proteus mirabilis (test Proteus mirabilis                          

 



             code = Proteus mirabilis)                                        



[facility] AND [id number] 16:48:00





             Test Item    Value        Reference Range Interpretation Comments

 

             UA Nitrite (test code Negative (18 10:48                      

     



             = UA Nitrite) AM)                                    



[facility] AND [id number] 16:48:00





             Test Item    Value        Reference Range Interpretation Comments

 

             UA Bili (test code = Negative *NA*(11/8/18                         

  



             UA Bili)     10:48 AM)                              



[facility] AND [id number] 16:48:00





             Test Item    Value        Reference Range Interpretation Comments

 

             UA Ketones (test code Negative *NA*(18                        

   



             = UA Ketones) 10:48 AM)                              



[facility] AND [id number] 16:48:00





             Test Item    Value        Reference Range Interpretation Comments

 

             UA Blood (test code = Trace *ABN*(18                          

 



             UA Blood)    10:48 AM)                              



[facility] AND [id number] 16:48:00





             Test Item    Value        Reference Range Interpretation Comments

 

             UA Urobilinogen (test code = UA 0.2          0.1-1.0               

    



             Urobilinogen)                                        



[facility] AND [id number] 16:48:00





             Test Item    Value        Reference Range Interpretation Comments

 

             UA Leuk Est (test code Large *ABN*(18                         

  



             = UA Leuk Est) 10:48 AM)                              



[facility] AND [id number] 16:48:00





             Test Item    Value        Reference Range Interpretation Comments

 

             UA Protein (test code Negative (18 10:48                      

     



             = UA Protein) AM)                                    



[facility] AND [id number] 16:48:00





             Test Item    Value        Reference Range Interpretation Comments

 

             UA Glucose (test code Negative (18 10:48                      

     



             = UA Glucose) AM)                                    



[facility] AND [id number] 16:48:00





             Test Item    Value        Reference Range Interpretation Comments

 

             UA pH (test code = UA pH) 7.0 1        5.0-8.0                   



[facility] AND [id number] 16:48:00





             Test Item    Value        Reference Range Interpretation Comments

 

             UA Spec Grav (test code = UA Spec 1.015 1                          

      



             Grav)                                               



[facility] AND [id number] 16:48:00





             Test Item    Value        Reference Range Interpretation Comments

 

             UA Color (test code = Yellow *NA*(18                          

 



             UA Color)    10:48 AM)                              



[facility] AND [id number] 16:48:00





             Test Item    Value        Reference Range Interpretation Comments

 

             UA Turbidity (test code = Clear (18 10:48                     

      



             UA Turbidity) AM)                                    



[facility] AND [id number] 16:48:00





             Test Item    Value        Reference Range Interpretation Comments

 

             UA Mucus (test code = UA Mucus) Few /LPF                           

    



[facility] [id number] 16:48:00





             Test Item    Value        Reference Range Interpretation Comments

 

             UA Bacteria (test code = UA Few /HPF                               



             Bacteria)                                           



[facility] [id number] 16:48:00





             Test Item    Value        Reference Range Interpretation Comments

 

             UA RBC (test code = 0-2 /HPF     See_Comment                [Automa

huma message] The



             UA RBC)                                             system which ge

nerated



                                                                 this result tra

nsmitted



                                                                 reference range

: <=2. The



                                                                 reference range

 was not



                                                                 used to interpr

et this



                                                                 result as zayda

l/abnormal.



[facility] AND [id number] 16:48:00





             Test Item    Value        Reference Range Interpretation Comments

 

             UA Sq Epi (test code = None Seen (18                          

 



             UA Sq Epi)   10:48 AM)                              



[facility] [id number] 16:48:00





             Test Item    Value        Reference Range Interpretation Comments

 

             UA WBC (test code = UA WBC)  /HPF                            



[facility]:ORDQN:[id number] 16:48:00





             Test Item    Value        Reference Range Interpretation Comments

 

             Culture: Urine (test >100,000 CFU/mL Proteus                       

    



             code = Culture: mirabilis 10,000 -                           



             Urine)       50,000 CFU/mL Skin Jaime                           



[facility]:ORDQN:[id number] 16:48:00





             Test Item    Value        Reference Range Interpretation Comments

 

             Proteus mirabilis (test Proteus mirabilis                          

 



             code = Proteus mirabilis)                                        



[facility] AND [id number] 16:48:00





             Test Item    Value        Reference Range Interpretation Comments

 

             UA Nitrite (test code Negative (18 10:48                      

     



             = UA Nitrite) AM)                                    



[facility] AND [id number] 16:48:00





             Test Item    Value        Reference Range Interpretation Comments

 

             UA Bili (test code = Negative *NA*(18                         

  



             UA Bili)     10:48 AM)                              



[facility] AND [id number] 16:48:00





             Test Item    Value        Reference Range Interpretation Comments

 

             UA Ketones (test code Negative *NA*(18                        

   



             = UA Ketones) 10:48 AM)                              



[facility] [id number] 16:48:00





             Test Item    Value        Reference Range Interpretation Comments

 

             UA Blood (test code = Trace *ABN*(18                          

 



             UA Blood)    10:48 AM)                              



[facility] AND [id number] 16:48:00





             Test Item    Value        Reference Range Interpretation Comments

 

             UA Urobilinogen (test code = UA 0.2          0.1-1.0               

    



             Urobilinogen)                                        



[facility] [id number] 16:48:00





             Test Item    Value        Reference Range Interpretation Comments

 

             UA Leuk Est (test code Large *ABN*(18                         

  



             = UA Leuk Est) 10:48 AM)                              



[facility] AND [id number] 16:48:00





             Test Item    Value        Reference Range Interpretation Comments

 

             UA Protein (test code Negative (18 10:48                      

     



             = UA Protein) AM)                                    



[facility] [id number] 16:48:00





             Test Item    Value        Reference Range Interpretation Comments

 

             UA Glucose (test code Negative (18 10:48                      

     



             = UA Glucose) AM)                                    



[facility] [id number] 16:48:00





             Test Item    Value        Reference Range Interpretation Comments

 

             UA pH (test code = UA pH) 7.0 1        5.0-8.0                   



[facility] AND [id number] 16:48:00





             Test Item    Value        Reference Range Interpretation Comments

 

             UA Spec Grav (test code = UA Spec 1.015 1                          

      



             Grav)                                               



[facility] AND [id number] 16:48:00





             Test Item    Value        Reference Range Interpretation Comments

 

             UA Color (test code = Yellow *NA*(18                          

 



             UA Color)    10:48 AM)                              



[facility] AND [id number] 16:48:00





             Test Item    Value        Reference Range Interpretation Comments

 

             UA Turbidity (test code = Clear (18 10:48                     

      



             UA Turbidity) AM)                                    



[facility] AND [id number] 16:48:00





             Test Item    Value        Reference Range Interpretation Comments

 

             UA Mucus (test code = UA Mucus) Few /LPF                           

    



[facility] AND [id number] 16:48:00





             Test Item    Value        Reference Range Interpretation Comments

 

             UA Bacteria (test code = UA Few /HPF                               



             Bacteria)                                           



[facility] [id number] 16:48:00





             Test Item    Value        Reference Range Interpretation Comments

 

             UA RBC (test code = 0-2 /HPF     See_Comment                [Automa

huma message] The



             UA RBC)                                             system which ge

nerated



                                                                 this result tra

nsmitted



                                                                 reference range

: <=2. The



                                                                 reference range

 was not



                                                                 used to interpr

et this



                                                                 result as zayda

l/abnormal.



[facility] [id number] 16:48:00





             Test Item    Value        Reference Range Interpretation Comments

 

             UA Sq Epi (test code = None Seen (18                          

 



             UA Sq Epi)   10:48 AM)                              



[facility] [id number] 16:48:00





             Test Item    Value        Reference Range Interpretation Comments

 

             UA WBC (test code = UA WBC)  /HPF                            



[facility]ORDQN:[id number] 16:48:00





             Test Item    Value        Reference Range Interpretation Comments

 

             Culture: Urine (test >100,000 CFU/mL Proteus                       

    



             code = Culture: mirabilis 10,000 -                           



             Urine)       50,000 CFU/mL Skin Jaime                           



[facility]PT:ISOLATE:ORDQN:[id number] 16:48:00





             Test Item    Value        Reference Range Interpretation Comments

 

             Proteus mirabilis (test Proteus mirabilis                          

 



             code = Proteus mirabilis)                                        



[facility] [id number] 16:48:00





             Test Item    Value        Reference Range Interpretation Comments

 

             UA Nitrite (test code Negative (18 10:48                      

     



             = UA Nitrite) AM)                                    



[facility] [id number] 16:48:00





             Test Item    Value        Reference Range Interpretation Comments

 

             UA Bili (test code = Negative *NA*(18                         

  



             UA Bili)     10:48 AM)                              



[facility] [id number] 16:48:00





             Test Item    Value        Reference Range Interpretation Comments

 

             UA Ketones (test code Negative *NA*(18                        

   



             = UA Ketones) 10:48 AM)                              



[facility] [id number] 16:48:00





             Test Item    Value        Reference Range Interpretation Comments

 

             UA Blood (test code = Trace *ABN*(18                          

 



             UA Blood)    10:48 AM)                              



[facility] [id number] 16:48:00





             Test Item    Value        Reference Range Interpretation Comments

 

             UA Urobilinogen (test code = UA 0.2          0.1-1.0               

    



             Urobilinogen)                                        



[facility] [id number] 16:48:00





             Test Item    Value        Reference Range Interpretation Comments

 

             UA Leuk Est (test code Large *ABN*(18                         

  



             = UA Leuk Est) 10:48 AM)                              



[facility] [id number] 16:48:00





             Test Item    Value        Reference Range Interpretation Comments

 

             UA Protein (test code Negative (18 10:48                      

     



             = UA Protein) AM)                                    



[facility] [id number] 16:48:00





             Test Item    Value        Reference Range Interpretation Comments

 

             UA Glucose (test code Negative (18 10:48                      

     



             = UA Glucose) AM)                                    



[facility] [id number] 16:48:00





             Test Item    Value        Reference Range Interpretation Comments

 

             UA pH (test code = UA pH) 7.0 1        5.0-8.0                   



[facility] [id number] 16:48:00





             Test Item    Value        Reference Range Interpretation Comments

 

             UA Spec Grav (test code = UA Spec 1.015 1                          

      



             Grav)                                               



[facility] [id number] 16:48:00





             Test Item    Value        Reference Range Interpretation Comments

 

             UA Color (test code = Yellow *NA*(18                          

 



             UA Color)    10:48 AM)                              



[facility] [id number] 16:48:00





             Test Item    Value        Reference Range Interpretation Comments

 

             UA Turbidity (test code = Clear (18 10:48                     

      



             UA Turbidity) AM)                                    



[facility] [id number] 16:48:00





             Test Item    Value        Reference Range Interpretation Comments

 

             UA Mucus (test code = UA Mucus) Few /LPF                           

    



[facility] [id number] 16:48:00





             Test Item    Value        Reference Range Interpretation Comments

 

             UA Bacteria (test code = UA Few /HPF                               



             Bacteria)                                           



[facility] [id number] 16:48:00





             Test Item    Value        Reference Range Interpretation Comments

 

             UA RBC (test code = 0-2 /HPF     See_Comment                [Automa

huma message] The



             UA RBC)                                             system which ge

nerated



                                                                 this result tra

nsmitted



                                                                 reference range

: <=2. The



                                                                 reference range

 was not



                                                                 used to interpr

et this



                                                                 result as zayda

l/abnormal.



[facility] [id number] 16:48:00





             Test Item    Value        Reference Range Interpretation Comments

 

             UA Sq Epi (test code = None Seen (18                          

 



             UA Sq Epi)   10:48 AM)                              



[facility] [id number] 16:48:00





             Test Item    Value        Reference Range Interpretation Comments

 

             UA WBC (test code = UA WBC)  /HPF                            



[facility]:ORDQN:[id number] 16:48:00





             Test Item    Value        Reference Range Interpretation Comments

 

             Culture: Urine (test >100,000 CFU/mL Proteus                       

    



             code = Culture: mirabilis 10,000 -                           



             Urine)       50,000 CFU/mL Skin Jaime                           



[facility]OLATE:ORDQN:[id number] 16:48:00





             Test Item    Value        Reference Range Interpretation Comments

 

             Proteus mirabilis (test Proteus mirabilis                          

 



             code = Proteus mirabilis)                                        



[facility] [id number] 16:48:00





             Test Item    Value        Reference Range Interpretation Comments

 

             UA Nitrite (test code Negative (18 10:48                      

     



             = UA Nitrite) AM)                                    



[facility] [id number] 16:48:00





             Test Item    Value        Reference Range Interpretation Comments

 

             UA Bili (test code = Negative *NA*(18                         

  



             UA Bili)     10:48 AM)                              



[facility] [id number] 16:48:00





             Test Item    Value        Reference Range Interpretation Comments

 

             UA Ketones (test code Negative *NA*(18                        

   



             = UA Ketones) 10:48 AM)                              



[facility] [id number] 16:48:00





             Test Item    Value        Reference Range Interpretation Comments

 

             UA Blood (test code = Trace *ABN*(18                          

 



             UA Blood)    10:48 AM)                              



[facility] [id number] 16:48:00





             Test Item    Value        Reference Range Interpretation Comments

 

             UA Urobilinogen (test code = UA 0.2          0.1-1.0               

    



             Urobilinogen)                                        



[facility] [id number] 16:48:00





             Test Item    Value        Reference Range Interpretation Comments

 

             UA Leuk Est (test code Large *ABN*(18                         

  



             = UA Leuk Est) 10:48 AM)                              



[facility] [id number] 16:48:00





             Test Item    Value        Reference Range Interpretation Comments

 

             UA Protein (test code Negative (18 10:48                      

     



             = UA Protein) AM)                                    



[facility] [id number] 16:48:00





             Test Item    Value        Reference Range Interpretation Comments

 

             UA Glucose (test code Negative (18 10:48                      

     



             = UA Glucose) AM)                                    



[facility] [id number] 16:48:00





             Test Item    Value        Reference Range Interpretation Comments

 

             UA pH (test code = UA pH) 7.0 1        5.0-8.0                   



[facility] [id number] 16:48:00





             Test Item    Value        Reference Range Interpretation Comments

 

             UA Spec Grav (test code = UA Spec 1.015 1                          

      



             Grav)                                               



[facility] [id number] 16:48:00





             Test Item    Value        Reference Range Interpretation Comments

 

             UA Color (test code = Yellow *NA*(18                          

 



             UA Color)    10:48 AM)                              



[facility] [id number] 16:48:00





             Test Item    Value        Reference Range Interpretation Comments

 

             UA Turbidity (test code = Clear (18 10:48                     

      



             UA Turbidity) AM)                                    



[facility] [id number] 16:48:00





             Test Item    Value        Reference Range Interpretation Comments

 

             UA Mucus (test code = UA Mucus) Few /LPF                           

    



[facility] [id number] 16:48:00





             Test Item    Value        Reference Range Interpretation Comments

 

             UA Bacteria (test code = UA Few /HPF                               



             Bacteria)                                           



[facility] [id number] 16:48:00





             Test Item    Value        Reference Range Interpretation Comments

 

             UA RBC (test code = 0-2 /HPF     See_Comment                [Automa

huma message] The



             UA RBC)                                             system which ge

nerated



                                                                 this result tra

nsmitted



                                                                 reference range

: <=2. The



                                                                 reference range

 was not



                                                                 used to interpr

et this



                                                                 result as zayda

l/abnormal.



[facility] [id number] 16:48:00





             Test Item    Value        Reference Range Interpretation Comments

 

             UA Sq Epi (test code = None Seen (18                          

 



             UA Sq Epi)   10:48 AM)                              



[facility] [id number] 16:48:00





             Test Item    Value        Reference Range Interpretation Comments

 

             UA WBC (test code = UA WBC)  /HPF                            



[facility] [id number] 11:57:00





             Test Item    Value        Reference Range Interpretation Comments

 

             Antibody Scrn (test Negative (18 5:57                         

  



             code = Antibody Scrn) AM)                                    



[facility] [id number] 11:57:00





             Test Item    Value        Reference Range Interpretation Comments

 

             ABO/Rh (test code = ABO/Rh) AB POS                                 



[facility] [id number] 11:57:00





             Test Item    Value        Reference Range Interpretation Comments

 

             PTT (test code = PTT) 33.4 s       22.9-35.8                 



[facility] [id number] 11:57:00





             Test Item    Value        Reference Range Interpretation Comments

 

             PT (test code = PT) 13.7 s       12.0-14.7                 



[facility] [id number] 11:57:00





             Test Item    Value        Reference Range Interpretation Comments

 

             INR (test code = INR) 1.05 1       0.85-1.17                 



[facility] [id number] 11:57:00





             Test Item    Value        Reference Range Interpretation Comments

 

             Antibody Scrn (test Negative (18 5:57                         

  



             code = Antibody Scrn) AM)                                    



[facility] [id number] 11:57:00





             Test Item    Value        Reference Range Interpretation Comments

 

             ABO/Rh (test code = ABO/Rh) AB POS                                 



[facility] [id number] 11:57:00





             Test Item    Value        Reference Range Interpretation Comments

 

             PTT (test code = PTT) 33.4 s       22.9-35.8                 



[facility] [id number] 11:57:00





             Test Item    Value        Reference Range Interpretation Comments

 

             PT (test code = PT) 13.7 s       12.0-14.7                 



[facility] [id number] 11:57:00





             Test Item    Value        Reference Range Interpretation Comments

 

             INR (test code = INR) 1.05 1       0.85-1.17                 



[facility]  11:57:00





             Test Item    Value        Reference Range Interpretation Comments

 

             Antibody Scrn (test Negative (18 5:57                         

  



             code = Antibody Scrn) AM)                                    



[facility] [id number] 11:57:00





             Test Item    Value        Reference Range Interpretation Comments

 

             ABO/Rh (test code = ABO/Rh) AB POS                                 



[facility] [id number] 11:57:00





             Test Item    Value        Reference Range Interpretation Comments

 

             PTT (test code = PTT) 33.4 s       22.9-35.8                 



[facility] [id number] 11:57:00





             Test Item    Value        Reference Range Interpretation Comments

 

             PT (test code = PT) 13.7 s       12.0-14.7                 



[facility] [id number] 11:57:00





             Test Item    Value        Reference Range Interpretation Comments

 

             INR (test code = INR) 1.05 1       0.85-1.17                 



[facility]  11:57:00





             Test Item    Value        Reference Range Interpretation Comments

 

             Antibody Scrn (test Negative (18 5:57                         

  



             code = Antibody Scrn) AM)                                    



[facility]  11:57:00





             Test Item    Value        Reference Range Interpretation Comments

 

             ABO/Rh (test code = ABO/Rh) AB POS                                 



[facility] [id number] 11:57:00





             Test Item    Value        Reference Range Interpretation Comments

 

             PTT (test code = PTT) 33.4 s       22.9-35.8                 



[facility] CenterVcmrrwrZPALVTKRYA7661-27-08 11:57:00





             Test Item    Value        Reference Range Interpretation Comments

 

             PT (test code = PT) 13.7 s       12.0-14.7                 



[facility] [id number] 11:57:00





             Test Item    Value        Reference Range Interpretation Comments

 

             INR (test code = INR) 1.05 1       0.85-1.17                 



[facility]  11:57:00





             Test Item    Value        Reference Range Interpretation Comments

 

             Antibody Scrn (test Negative (18 5:57                         

  



             code = Antibody Scrn) AM)                                    



[facility] [id number] 11:57:00





             Test Item    Value        Reference Range Interpretation Comments

 

             ABO/Rh (test code = ABO/Rh) AB POS                                 



[facility] [id number] 11:57:00





             Test Item    Value        Reference Range Interpretation Comments

 

             PTT (test code = PTT) 33.4 s       22.9-35.8                 



[facility] [id number] 11:57:00





             Test Item    Value        Reference Range Interpretation Comments

 

             PT (test code = PT) 13.7 s       12.0-14.7                 



[facility] [id number] 11:57:00





             Test Item    Value        Reference Range Interpretation Comments

 

             INR (test code = INR) 1.05 1       0.85-1.17                 



[facility] [id number] 11:57:00





             Test Item    Value        Reference Range Interpretation Comments

 

             Antibody Scrn (test Negative (18 5:57                         

  



             code = Antibody Scrn) AM)                                    



[facility] [id number] 11:57:00





             Test Item    Value        Reference Range Interpretation Comments

 

             ABO/Rh (test code = ABO/Rh) AB POS                                 



[facility] [id number] 11:57:00





             Test Item    Value        Reference Range Interpretation Comments

 

             PTT (test code = PTT) 33.4 s       22.9-35.8                 



[facility] CenterQykascnOQQZFGCXOQ0247-83-89 11:57:00





             Test Item    Value        Reference Range Interpretation Comments

 

             PT (test code = PT) 13.7 s       12.0-14.7                 



[facility] [id number] 11:57:00





             Test Item    Value        Reference Range Interpretation Comments

 

             INR (test code = INR) 1.05 1       0.85-1.17                 



[facility]  11:57:00





             Test Item    Value        Reference Range Interpretation Comments

 

             Antibody Scrn (test Negative (18 5:57                         

  



             code = Antibody Scrn) AM)                                    



[facility] [id number] 11:57:00





             Test Item    Value        Reference Range Interpretation Comments

 

             ABO/Rh (test code = ABO/Rh) AB POS                                 



[facility] [id number] 11:57:00





             Test Item    Value        Reference Range Interpretation Comments

 

             PTT (test code = PTT) 33.4 s       22.9-35.8                 



[facility] CenterPdycmqxPUYLDNDFKG2257-47-03 11:57:00





             Test Item    Value        Reference Range Interpretation Comments

 

             PT (test code = PT) 13.7 s       12.0-14.7                 



[facility] CenterUxearscRILGRLHKPL5506-59-44 11:57:00





             Test Item    Value        Reference Range Interpretation Comments

 

             INR (test code = INR) 1.05 1       0.85-1.17                 



[facility]  11:57:00





             Test Item    Value        Reference Range Interpretation Comments

 

             Antibody Scrn (test Negative (18 5:57                         

  



             code = Antibody Scrn) AM)                                    



[facility] [id number] 11:57:00





             Test Item    Value        Reference Range Interpretation Comments

 

             ABO/Rh (test code = ABO/Rh) AB POS                                 



[facility] C[id number] 11:57:00





             Test Item    Value        Reference Range Interpretation Comments

 

             PTT (test code = PTT) 33.4 s       22.9-35.8                 



[facility] CenterFfktquvHFYOLQWEOP3177-10-79 11:57:00





             Test Item    Value        Reference Range Interpretation Comments

 

             PT (test code = PT) 13.7 s       12.0-14.7                 



[facility] CenterHbjzlapJLNEVTYGEK8971-07-44 11:57:00





             Test Item    Value        Reference Range Interpretation Comments

 

             INR (test code = INR) 1.05 1       0.85-1.17                 



[facility]2018-11-08 10:50:01





             Test Item    Value        Reference Range Interpretation Comments

 

             eGFR (test code = eGFR) 133                                    



[facility]2018-11-08 10:50:01





             Test Item    Value        Reference Range Interpretation Comments

 

             Calcium Lvl (test code = Calcium Lvl) 9.4          8.5-10.5        

          



[facility]2018-11-08 10:50:01





             Test Item    Value        Reference Range Interpretation Comments

 

             CO2 (test code = CO2) 28           24-32                     



[facility]2018-11-08 10:50:01





             Test Item    Value        Reference Range Interpretation Comments

 

             BUN (test code = BUN) 14           7-22                      



[facility]2018-11-08 10:50:01





             Test Item    Value        Reference Range Interpretation Comments

 

             Glucose Lvl (test code = Glucose Lvl) 89           70-99           

          



[facility]2018-11-08 10:50:01





             Test Item    Value        Reference Range Interpretation Comments

 

             Chloride Lvl (test code = Chloride Lvl) 104                  

            



[facility]2018-11-08 10:50:01





             Test Item    Value        Reference Range Interpretation Comments

 

             Potassium Lvl (test code = Potassium 4.2          3.5-5.1          

         



             Lvl)                                                



[facility]2018-11-08 10:50:01





             Test Item    Value        Reference Range Interpretation Comments

 

             Sodium Lvl (test code = Sodium Lvl) 137          135-145           

        



[facility]2018-11-08 10:50:01





             Test Item    Value        Reference Range Interpretation Comments

 

             Creatinine Lvl (test code = Creatinine 0.85         0.50-1.40      

           



             Lvl)                                                



[facility]2018-11-08 10:50:01





             Test Item    Value        Reference Range Interpretation Comments

 

             AGAP (test code = AGAP) 9.2          10.0-20.0                 



[facility] CenterPtyikejZKQBOPBXUZ9269-90-22 10:50:01





             Test Item    Value        Reference Range Interpretation Comments

 

             ACT (TEG) Rapid (test code = ACT (TEG) 136 s                 

           



             Rapid)                                              



[facility] CenterBzifylzFDPWHMPRSE3342-62-07 10:50:01





             Test Item    Value        Reference Range Interpretation Comments

 

             Split Point Rapid (test code = Split 0.6 min                       

         



             Point Rapid)                                        



[facility] CenterJobcsaeWSIVSLEKCX0623-48-23 10:50:01





             Test Item    Value        Reference Range Interpretation Comments

 

             R-time Rapid (test code = R-time 0.9 min      0.4-0.7              

     



             Rapid)                                              



[facility] CenterThnzpgsXDPZXSRKZL2432-39-40 10:50:01





             Test Item    Value        Reference Range Interpretation Comments

 

             K-time Rapid (test code = K-time 1.4 min      0.6-2.3              

     



             Rapid)                                              



[facility] CenterIhxsbvbZEQECXJRRV4729-34-37 10:50:01





             Test Item    Value        Reference Range Interpretation Comments

 

             Angle Rapid (test code = Angle 71 degrees   64-80                  

   



             Rapid)                                              



[facility] CenterGmuojjcPKSOFGKVTR3041-74-00 10:50:01





             Test Item    Value        Reference Range Interpretation Comments

 

             G-value Rapid (test code = G-value 12.7         5.0-11.6           

       



             Rapid)                                              



[facility] CenterGyusrflTJFWIAUEDW0922-54-63 10:50:01





             Test Item    Value        Reference Range Interpretation Comments

 

             Max Amplitude Rapid (test code = Max 72 mm        52-71            

         



             Amplitude Rapid)                                        



[facility] CenterSildllqXFEVMZGYJV5139-04-06 10:50:01





             Test Item    Value        Reference Range Interpretation Comments

 

             Estimated % Lysis Rapid 0.1          See_Comment                [Au

tomated message] The



             (test code = Estimated                                        syste

m which generated



             % Lysis Rapid)                                        this result t

ransmitted



                                                                 reference range

: <=7.5.



                                                                 The reference r

zhen was



                                                                 not used to int

erpret



                                                                 this result as



                                                                 normal/abnormal

.



[facility] CenterUdpjfpwTDODPHZGIQ0878-36-47 10:50:01





             Test Item    Value        Reference Range Interpretation Comments

 

             Platelet (test code = Platelet) 367          133-450               

    



[facility] CenterErnhlszMIYOFQPHXD3778-18-96 10:50:01





             Test Item    Value        Reference Range Interpretation Comments

 

             MPV (test code = MPV) 7.8          7.4-10.4                  



[facility] CenterAfjfhopZMJXFNPODV8542-69-20 10:50:01





             Test Item    Value        Reference Range Interpretation Comments

 

             MCH (test code = MCH) 27.4 pg      27.0-31.0                 



[facility] CenterVslcicqIPLWHIFFVG9761-75-69 10:50:01





             Test Item    Value        Reference Range Interpretation Comments

 

             MCV (test code = MCV) 80.7         80.0-94.0                 



[facility] CenterRqbfaasWPMBWLOYDL9815-58-08 10:50:01





             Test Item    Value        Reference Range Interpretation Comments

 

             MCHC (test code = MCHC) 34.0         32.0-36.0                 



[facility] CenterYdkiguuSTVCWXNVVO1984-46-89 10:50:01





             Test Item    Value        Reference Range Interpretation Comments

 

             RDW (test code = RDW) 18.9         11.5-14.5                 



[facility] CenterMqdoqwlLTZFJKGTXC0715-75-87 10:50:01





             Test Item    Value        Reference Range Interpretation Comments

 

             Hct (test code = Hct) 43.3         42.0-54.0                 



[facility] CenterUiocaysFOBYUTFJWZ8545-89-51 10:50:01





             Test Item    Value        Reference Range Interpretation Comments

 

             WBC (test code = WBC) 9.3          3.7-10.4                  



[facility] CenterHrtljetYCVLWJUQCT1545-38-49 10:50:01





             Test Item    Value        Reference Range Interpretation Comments

 

             Hgb (test code = Hgb) 14.7         14.0-18.0                 



[facility] CenterZnhpclsBZVMITRLBK5805-34-28 10:50:01





             Test Item    Value        Reference Range Interpretation Comments

 

             RBC (test code = RBC) 5.36         4.70-6.10                 



[facility] CenterZlttukhNXYEJJPEAZ9116-34-55 10:50:01





             Test Item    Value        Reference Range Interpretation Comments

 

             Eosinophils # (test code 0.2          See_Comment                [A

utomated message] The



             = Eosinophils #)                                        system whic

h generated



                                                                 this result tra

nsmitted



                                                                 reference range

: <=0.5.



                                                                 The reference r

zhen was



                                                                 not used to int

erpret



                                                                 this result as



                                                                 normal/abnormal

.



[facility] CenterUpqyywlQNWZYXVXKX3239-51-12 10:50:01





             Test Item    Value        Reference Range Interpretation Comments

 

             Basophils # (test code 0.1          See_Comment                [Aut

omated message] The



             = Basophils #)                                        system which 

generated



                                                                 this result tra

nsmitted



                                                                 reference range

: <=0.2.



                                                                 The reference r

zhen was



                                                                 not used to int

erpret



                                                                 this result as



                                                                 normal/abnormal

.



[facility] CenterJrpofxyXHWKCHUFWZ5515-00-25 10:50:01





             Test Item    Value        Reference Range Interpretation Comments

 

             Lymphocytes # (test code = Lymphocytes 1.8          1.0-5.5        

           



             #)                                                  



[facility] CenterFxkmdsdQCBMEGNMIR9988-82-27 10:50:01





             Test Item    Value        Reference Range Interpretation Comments

 

             Monocytes # (test code 0.9          See_Comment                [Aut

omated message] The



             = Monocytes #)                                        system which 

generated



                                                                 this result tra

nsmitted



                                                                 reference range

: <=0.8.



                                                                 The reference r

zhen was



                                                                 not used to int

erpret



                                                                 this result as



                                                                 normal/abnormal

.



[facility] CenterUceyfuuHXLWZAVOVY0461-28-83 10:50:01





             Test Item    Value        Reference Range Interpretation Comments

 

             Neutrophils # (test code = Neutrophils 6.3          1.5-8.1        

           



             #)                                                  



[facility] CenterQrjqxbjQGYZPLJBZE6211-87-90 10:50:01





             Test Item    Value        Reference Range Interpretation Comments

 

             Eosinophils (test code = 2.0          See_Comment                [A

utomated message] The



             Eosinophils)                                        system which ge

nerated



                                                                 this result tra

nsmitted



                                                                 reference range

: <=4.0.



                                                                 The reference r

zhen was



                                                                 not used to int

erpret



                                                                 this result as



                                                                 normal/abnormal

.



[facility] CenterWibthlaOVXAYVKDDL1190-84-11 10:50:01





             Test Item    Value        Reference Range Interpretation Comments

 

             Segs (test code = Segs) 67.4         45.0-75.0                 



[facility] CenterTdrmychSRHYDDKLXK7035-39-40 10:50:01





             Test Item    Value        Reference Range Interpretation Comments

 

             Lymphocytes (test code = Lymphocytes) 19.9         20.0-40.0       

          



[facility] CenterXwlcsnpGKGKFUZQYH8210-02-70 10:50:01





             Test Item    Value        Reference Range Interpretation Comments

 

             Basophils (test code = 1.0          See_Comment                [Aut

omated message] The



             Basophils)                                          system which ge

nerated



                                                                 this result tra

nsmitted



                                                                 reference range

: <=1.0.



                                                                 The reference r

zhen was



                                                                 not used to int

erpret



                                                                 this result as



                                                                 normal/abnormal

.



[facility] CenterClwoznkCDLYHNMGNP1220-84-12 10:50:01





             Test Item    Value        Reference Range Interpretation Comments

 

             Monocytes (test code = Monocytes) 9.7          2.0-12.0            

      



[facility]2018-11-08 10:50:01





             Test Item    Value        Reference Range Interpretation Comments

 

             eGFR (test code = eGFR) 133                                    



[facility]2018-11-08 10:50:01





             Test Item    Value        Reference Range Interpretation Comments

 

             Calcium Lvl (test code = Calcium Lvl) 9.4          8.5-10.5        

          



[facility]2018-11-08 10:50:01





             Test Item    Value        Reference Range Interpretation Comments

 

             CO2 (test code = CO2) 28           24-32                     



[facility]2018-11-08 10:50:01





             Test Item    Value        Reference Range Interpretation Comments

 

             BUN (test code = BUN) 14           7-22                      



[facility]2018-11-08 10:50:01





             Test Item    Value        Reference Range Interpretation Comments

 

             Glucose Lvl (test code = Glucose Lvl) 89           70-99           

          



[facility]2018-11-08 10:50:01





             Test Item    Value        Reference Range Interpretation Comments

 

             Chloride Lvl (test code = Chloride Lvl) 104                  

            



[facility]2018-11-08 10:50:01





             Test Item    Value        Reference Range Interpretation Comments

 

             Potassium Lvl (test code = Potassium 4.2          3.5-5.1          

         



             Lvl)                                                



[facility]2018-11-08 10:50:01





             Test Item    Value        Reference Range Interpretation Comments

 

             Sodium Lvl (test code = Sodium Lvl) 137          135-145           

        



[facility]2018-11-08 10:50:01





             Test Item    Value        Reference Range Interpretation Comments

 

             Creatinine Lvl (test code = Creatinine 0.85         0.50-1.40      

           



             Lvl)                                                



[facility]2018-11-08 10:50:01





             Test Item    Value        Reference Range Interpretation Comments

 

             AGAP (test code = AGAP) 9.2          10.0-20.0                 



[facility] CenterArhciamIJFEAESHTR7205-45-67 10:50:01





             Test Item    Value        Reference Range Interpretation Comments

 

             ACT (TEG) Rapid (test code = ACT (TEG) 136 s                 

           



             Rapid)                                              



[facility] CenterVdmecslIMSDSDYWOK9370-30-93 10:50:01





             Test Item    Value        Reference Range Interpretation Comments

 

             Split Point Rapid (test code = Split 0.6 min                       

         



             Point Rapid)                                        



[facility] CenterBgpmxmnVAXWRXMOSR6226-91-75 10:50:01





             Test Item    Value        Reference Range Interpretation Comments

 

             R-time Rapid (test code = R-time 0.9 min      0.4-0.7              

     



             Rapid)                                              



[facility] CenterUhhdwpmWZXSETOGJF4179-51-82 10:50:01





             Test Item    Value        Reference Range Interpretation Comments

 

             K-time Rapid (test code = K-time 1.4 min      0.6-2.3              

     



             Rapid)                                              



[facility] CenterBvjvicwLTVTLHWTNT6868-34-93 10:50:01





             Test Item    Value        Reference Range Interpretation Comments

 

             Angle Rapid (test code = Angle 71 degrees   64-80                  

   



             Rapid)                                              



[facility] CenterPakfmbeNSBOCHOIZS6173-63-78 10:50:01





             Test Item    Value        Reference Range Interpretation Comments

 

             G-value Rapid (test code = G-value 12.7         5.0-11.6           

       



             Rapid)                                              



[facility] CenterEhunfbcBXCDMRZCPZ4607-19-94 10:50:01





             Test Item    Value        Reference Range Interpretation Comments

 

             Max Amplitude Rapid (test code = Max 72 mm        52-71            

         



             Amplitude Rapid)                                        



[facility] CenterWcaitfqDVIGXMOXMB0643-95-75 10:50:01





             Test Item    Value        Reference Range Interpretation Comments

 

             Estimated % Lysis Rapid 0.1          See_Comment                [Au

tomated message] The



             (test code = Estimated                                        syste

m which generated



             % Lysis Rapid)                                        this result t

ransmitted



                                                                 reference range

: <=7.5.



                                                                 The reference r

zhen was



                                                                 not used to int

erpret



                                                                 this result as



                                                                 normal/abnormal

.



[facility] CenterSgjhawmWJEGABVXZD6272-78-40 10:50:01





             Test Item    Value        Reference Range Interpretation Comments

 

             Platelet (test code = Platelet) 367          133-450               

    



[facility] CenterDriycyxRQOBYCULWV4181-44-91 10:50:01





             Test Item    Value        Reference Range Interpretation Comments

 

             MPV (test code = MPV) 7.8          7.4-10.4                  



[facility] CenterCoeyywxMBCXUZCKQT0187-29-84 10:50:01





             Test Item    Value        Reference Range Interpretation Comments

 

             MCH (test code = MCH) 27.4 pg      27.0-31.0                 



[facility] CenterVrfeylqCWAQOMGTKA0632-57-49 10:50:01





             Test Item    Value        Reference Range Interpretation Comments

 

             MCV (test code = MCV) 80.7         80.0-94.0                 



[facility] CenterPwiazugUSBFQAOIWA1700-42-19 10:50:01





             Test Item    Value        Reference Range Interpretation Comments

 

             MCHC (test code = MCHC) 34.0         32.0-36.0                 



[facility] CenterBdbiwpoRKZKYSXXCO7097-37-56 10:50:01





             Test Item    Value        Reference Range Interpretation Comments

 

             RDW (test code = RDW) 18.9         11.5-14.5                 



[facility] CenterRiaqxdeHKKCDFGTCW6319-87-02 10:50:01





             Test Item    Value        Reference Range Interpretation Comments

 

             Hct (test code = Hct) 43.3         42.0-54.0                 



[facility] CenterOfchcssNIUIUIXJVI8199-53-60 10:50:01





             Test Item    Value        Reference Range Interpretation Comments

 

             WBC (test code = WBC) 9.3          3.7-10.4                  



[facility] CenterMhttvxoKVIYDLXQAS4693-37-35 10:50:01





             Test Item    Value        Reference Range Interpretation Comments

 

             Hgb (test code = Hgb) 14.7         14.0-18.0                 



[facility] CenterDqmnejaMYRFLSVHXQ7250-95-76 10:50:01





             Test Item    Value        Reference Range Interpretation Comments

 

             RBC (test code = RBC) 5.36         4.70-6.10                 



[facility] CenterQnqbuomPTHOWQIOYZ8165-36-90 10:50:01





             Test Item    Value        Reference Range Interpretation Comments

 

             Eosinophils # (test code 0.2          See_Comment                [A

utomated message] The



             = Eosinophils #)                                        system whic

h generated



                                                                 this result tra

nsmitted



                                                                 reference range

: <=0.5.



                                                                 The reference r

zhen was



                                                                 not used to int

erpret



                                                                 this result as



                                                                 normal/abnormal

.



[facility] CenterDrftqsvUOWAHDQKUA5674-09-00 10:50:01





             Test Item    Value        Reference Range Interpretation Comments

 

             Basophils # (test code 0.1          See_Comment                [Aut

omated message] The



             = Basophils #)                                        system which 

generated



                                                                 this result tra

nsmitted



                                                                 reference range

: <=0.2.



                                                                 The reference r

zhen was



                                                                 not used to int

erpret



                                                                 this result as



                                                                 normal/abnormal

.



[facility] CenterNwezdmnCHYITQCWJI7784-80-01 10:50:01





             Test Item    Value        Reference Range Interpretation Comments

 

             Lymphocytes # (test code = Lymphocytes 1.8          1.0-5.5        

           



             #)                                                  



[facility] CenterNkqsojaJPIERFJBDV3133-46-94 10:50:01





             Test Item    Value        Reference Range Interpretation Comments

 

             Monocytes # (test code 0.9          See_Comment                [Aut

omated message] The



             = Monocytes #)                                        system which 

generated



                                                                 this result tra

nsmitted



                                                                 reference range

: <=0.8.



                                                                 The reference r

zhen was



                                                                 not used to int

erpret



                                                                 this result as



                                                                 normal/abnormal

.



[facility] CenterCdxmckhAWYLJVTVRS2014-76-27 10:50:01





             Test Item    Value        Reference Range Interpretation Comments

 

             Neutrophils # (test code = Neutrophils 6.3          1.5-8.1        

           



             #)                                                  



[facility] CenterNgpqocuCVVWHBEBQY8560-52-76 10:50:01





             Test Item    Value        Reference Range Interpretation Comments

 

             Eosinophils (test code = 2.0          See_Comment                [A

utomated message] The



             Eosinophils)                                        system which ge

nerated



                                                                 this result tra

nsmitted



                                                                 reference range

: <=4.0.



                                                                 The reference r

zhen was



                                                                 not used to int

erpret



                                                                 this result as



                                                                 normal/abnormal

.



[facility] CenterTrtgjjfJEWHLPGNIZ0596-61-86 10:50:01





             Test Item    Value        Reference Range Interpretation Comments

 

             Segs (test code = Segs) 67.4         45.0-75.0                 



[facility] CenterYsaqsfqRZIXZXSFBM1133-38-41 10:50:01





             Test Item    Value        Reference Range Interpretation Comments

 

             Lymphocytes (test code = Lymphocytes) 19.9         20.0-40.0       

          



[facility] CenterUzrhgrbOLDUAVDVSE6674-66-07 10:50:01





             Test Item    Value        Reference Range Interpretation Comments

 

             Basophils (test code = 1.0          See_Comment                [Aut

omated message] The



             Basophils)                                          system which ge

nerated



                                                                 this result tra

nsmitted



                                                                 reference range

: <=1.0.



                                                                 The reference r

zhen was



                                                                 not used to int

erpret



                                                                 this result as



                                                                 normal/abnormal

.



[facility]MkbvmwuUUOFHYVEMC7459[facility]01





             Test Item    Value        Reference Range Interpretation Comments

 

             Monocytes (test code = Monocytes) 9.7          2.0-12.0            

      



[facility]2018-11-08 10:50:01





             Test Item    Value        Reference Range Interpretation Comments

 

             eGFR (test code = eGFR) 133                                    



[facility]2018-11-08 10:50:01





             Test Item    Value        Reference Range Interpretation Comments

 

             Calcium Lvl (test code = Calcium Lvl) 9.4          8.5-10.5        

          



[facility]2018-11-08 10:50:01





             Test Item    Value        Reference Range Interpretation Comments

 

             CO2 (test code = CO2) 28           24-32                     



[facility]2018-11-08 10:50:01





             Test Item    Value        Reference Range Interpretation Comments

 

             BUN (test code = BUN) 14           7-22                      



[facility]2018-11-08 10:50:01





             Test Item    Value        Reference Range Interpretation Comments

 

             Glucose Lvl (test code = Glucose Lvl) 89           70-99           

          



[facility]2018-11-08 10:50:01





             Test Item    Value        Reference Range Interpretation Comments

 

             Chloride Lvl (test code = Chloride Lvl) 104                  

            



[facility]2018-11-08 10:50:01





             Test Item    Value        Reference Range Interpretation Comments

 

             Potassium Lvl (test code = Potassium 4.2          3.5-5.1          

         



             Lvl)                                                



[facility]2018-11-08 10:50:01





             Test Item    Value        Reference Range Interpretation Comments

 

             Sodium Lvl (test code = Sodium Lvl) 137          135-145           

        



[facility]2018-11-08 10:50:01





             Test Item    Value        Reference Range Interpretation Comments

 

             Creatinine Lvl (test code = Creatinine 0.85         0.50-1.40      

           



             Lvl)                                                



[facility]2018-11-08 10:50:01





             Test Item    Value        Reference Range Interpretation Comments

 

             AGAP (test code = AGAP) 9.2          10.0-20.0                 



[facility] CenterZtgreyaJXUPDNPLIK2076-06-72 10:50:01





             Test Item    Value        Reference Range Interpretation Comments

 

             ACT (TEG) Rapid (test code = ACT (TEG) 136 s                 

           



             Rapid)                                              



[facility] CenterJuwyeezPLSKHQSRLW4297-77-40 10:50:01





             Test Item    Value        Reference Range Interpretation Comments

 

             Split Point Rapid (test code = Split 0.6 min                       

         



             Point Rapid)                                        



[facility] CenterEctctujSEVXYJBFJQ6439-34-34 10:50:01





             Test Item    Value        Reference Range Interpretation Comments

 

             R-time Rapid (test code = R-time 0.9 min      0.4-0.7              

     



             Rapid)                                              



[facility] CenterKkfwxpjTRLREDGUXT8080-30-21 10:50:01





             Test Item    Value        Reference Range Interpretation Comments

 

             K-time Rapid (test code = K-time 1.4 min      0.6-2.3              

     



             Rapid)                                              



[facility] CenterNsjjmcfOOHTWHYHKJ8458-48-02 10:50:01





             Test Item    Value        Reference Range Interpretation Comments

 

             Angle Rapid (test code = Angle 71 degrees   64-80                  

   



             Rapid)                                              



[facility] CenterIujogywXMADMKEWPN7056-70-01 10:50:01





             Test Item    Value        Reference Range Interpretation Comments

 

             G-value Rapid (test code = G-value 12.7         5.0-11.6           

       



             Rapid)                                              



[facility] CenterKffvvwvNKACBAILEU4491-58-75 10:50:01





             Test Item    Value        Reference Range Interpretation Comments

 

             Max Amplitude Rapid (test code = Max 72 mm        52-71            

         



             Amplitude Rapid)                                        



[facility] CenterCkeqfamNMDRSWOYIW3130-01-22 10:50:01





             Test Item    Value        Reference Range Interpretation Comments

 

             Estimated % Lysis Rapid 0.1          See_Comment                [Au

tomated message] The



             (test code = Estimated                                        syste

m which generated



             % Lysis Rapid)                                        this result t

ransmitted



                                                                 reference range

: <=7.5.



                                                                 The reference r

zhen was



                                                                 not used to int

erpret



                                                                 this result as



                                                                 normal/abnormal

.



[facility] CenterSmsevgpZHYWVVNNYH8152-54-13 10:50:01





             Test Item    Value        Reference Range Interpretation Comments

 

             Platelet (test code = Platelet) 367          133-450               

    



[facility] CenterRfnhtvoFHDDPTZLAL9851-72-84 10:50:01





             Test Item    Value        Reference Range Interpretation Comments

 

             MPV (test code = MPV) 7.8          7.4-10.4                  



[facility] CenterVyditqwONAVPPIVXB1398-14-08 10:50:01





             Test Item    Value        Reference Range Interpretation Comments

 

             MCH (test code = MCH) 27.4 pg      27.0-31.0                 



[facility] CenterDuoxeioBHRCFWJALE9900-10-80 10:50:01





             Test Item    Value        Reference Range Interpretation Comments

 

             MCV (test code = MCV) 80.7         80.0-94.0                 



[facility] CenterIkxhgnmZUJTGGADFS8216-73-04 10:50:01





             Test Item    Value        Reference Range Interpretation Comments

 

             MCHC (test code = MCHC) 34.0         32.0-36.0                 



[facility] CenterAdtgbfkSAVUZIEACF4000-85-21 10:50:01





             Test Item    Value        Reference Range Interpretation Comments

 

             RDW (test code = RDW) 18.9         11.5-14.5                 



[facility] CenterCvwmkdeOYJMIOFCOO3777-36-85 10:50:01





             Test Item    Value        Reference Range Interpretation Comments

 

             Hct (test code = Hct) 43.3         42.0-54.0                 



[facility] CenterKuyhsykBBFLJQQHXV7490-56-15 10:50:01





             Test Item    Value        Reference Range Interpretation Comments

 

             WBC (test code = WBC) 9.3          3.7-10.4                  



[facility] CenterKjtxzxtMIKTZWFGSH4259-25-85 10:50:01





             Test Item    Value        Reference Range Interpretation Comments

 

             Hgb (test code = Hgb) 14.7         14.0-18.0                 



[facility] CenterGgrbpiuAIIUSNZKUZ5128-56-20 10:50:01





             Test Item    Value        Reference Range Interpretation Comments

 

             RBC (test code = RBC) 5.36         4.70-6.10                 



[facility] CenterJwgddbkECPBZYNJWG4398-71-39 10:50:01





             Test Item    Value        Reference Range Interpretation Comments

 

             Eosinophils # (test code 0.2          See_Comment                [A

utomated message] The



             = Eosinophils #)                                        system whic

h generated



                                                                 this result tra

nsmitted



                                                                 reference range

: <=0.5.



                                                                 The reference r

zhen was



                                                                 not used to int

erpret



                                                                 this result as



                                                                 normal/abnormal

.



[facility] CenterNscciqvUSFWNABOWE8507-44-64 10:50:01





             Test Item    Value        Reference Range Interpretation Comments

 

             Basophils # (test code 0.1          See_Comment                [Aut

omated message] The



             = Basophils #)                                        system which 

generated



                                                                 this result tra

nsmitted



                                                                 reference range

: <=0.2.



                                                                 The reference r

zhen was



                                                                 not used to int

erpret



                                                                 this result as



                                                                 normal/abnormal

.



[facility] CenterZqlejakCPFFBAETWG4269-97-80 10:50:01





             Test Item    Value        Reference Range Interpretation Comments

 

             Lymphocytes # (test code = Lymphocytes 1.8          1.0-5.5        

           



             #)                                                  



[facility] CenterZrqkhmfZJNJBFSIHZ2522-33-18 10:50:01





             Test Item    Value        Reference Range Interpretation Comments

 

             Monocytes # (test code 0.9          See_Comment                [Aut

omated message] The



             = Monocytes #)                                        system which 

generated



                                                                 this result tra

nsmitted



                                                                 reference range

: <=0.8.



                                                                 The reference r

zhen was



                                                                 not used to int

erpret



                                                                 this result as



                                                                 normal/abnormal

.



[facility] CenterYohrdboJKXBZFIPXM1030-75-13 10:50:01





             Test Item    Value        Reference Range Interpretation Comments

 

             Neutrophils # (test code = Neutrophils 6.3          1.5-8.1        

           



             #)                                                  



[facility] CenterPmnddraIYDYWZXCKM8617-87-41 10:50:01





             Test Item    Value        Reference Range Interpretation Comments

 

             Eosinophils (test code = 2.0          See_Comment                [A

utomated message] The



             Eosinophils)                                        system which ge

nerated



                                                                 this result tra

nsmitted



                                                                 reference range

: <=4.0.



                                                                 The reference r

zhen was



                                                                 not used to int

erpret



                                                                 this result as



                                                                 normal/abnormal

.



[facility] CenterGtmhtwpPSQPYYQUDI1157-04-03 10:50:01





             Test Item    Value        Reference Range Interpretation Comments

 

             Segs (test code = Segs) 67.4         45.0-75.0                 



[facility] CenterUvxrpqeTYPDJNOPWY2408-23-36 10:50:01





             Test Item    Value        Reference Range Interpretation Comments

 

             Lymphocytes (test code = Lymphocytes) 19.9         20.0-40.0       

          



[facility] CenterAzmvkmrBSOEEDZRFM5534-29-80 10:50:01





             Test Item    Value        Reference Range Interpretation Comments

 

             Basophils (test code = 1.0          See_Comment                [Aut

omated message] The



             Basophils)                                          system which ge

nerated



                                                                 this result tra

nsmitted



                                                                 reference range

: <=1.0.



                                                                 The reference r

zhen was



                                                                 not used to int

erpret



                                                                 this result as



                                                                 normal/abnormal

.



[facility] CenterWgvdjxwDQQTFBMETV5863-35-68 10:50:01





             Test Item    Value        Reference Range Interpretation Comments

 

             Monocytes (test code = Monocytes) 9.7          2.0-12.0            

      



[facility]2018-11-08 10:50:01





             Test Item    Value        Reference Range Interpretation Comments

 

             eGFR (test code = eGFR) 133                                    



[facility]2018-11-08 10:50:01





             Test Item    Value        Reference Range Interpretation Comments

 

             Calcium Lvl (test code = Calcium Lvl) 9.4          8.5-10.5        

          



[facility]2018-11-08 10:50:01





             Test Item    Value        Reference Range Interpretation Comments

 

             CO2 (test code = CO2) 28           24-32                     



[facility]2018-11-08 10:50:01





             Test Item    Value        Reference Range Interpretation Comments

 

             BUN (test code = BUN) 14           7-22                      



[facility]2018-11-08 10:50:01





             Test Item    Value        Reference Range Interpretation Comments

 

             Glucose Lvl (test code = Glucose Lvl) 89           70-99           

          



[facility]2018-11-08 10:50:01





             Test Item    Value        Reference Range Interpretation Comments

 

             Chloride Lvl (test code = Chloride Lvl) 104                  

            



[facility]2018-11-08 10:50:01





             Test Item    Value        Reference Range Interpretation Comments

 

             Potassium Lvl (test code = Potassium 4.2          3.5-5.1          

         



             Lvl)                                                



[facility]2018-11-08 10:50:01





             Test Item    Value        Reference Range Interpretation Comments

 

             Sodium Lvl (test code = Sodium Lvl) 137          135-145           

        



[facility]2018-11-08 10:50:01





             Test Item    Value        Reference Range Interpretation Comments

 

             Creatinine Lvl (test code = Creatinine 0.85         0.50-1.40      

           



             Lvl)                                                



[facility]2018-11-08 10:50:01





             Test Item    Value        Reference Range Interpretation Comments

 

             AGAP (test code = AGAP) 9.2          10.0-20.0                 



[facility] CenterUaosveqWVRADNSOTC2313-65-39 10:50:01





             Test Item    Value        Reference Range Interpretation Comments

 

             ACT (TEG) Rapid (test code = ACT (TEG) 136 s                 

           



             Rapid)                                              



[facility] CenterEhtgsnjKBRULGOFMA0943-13-30 10:50:01





             Test Item    Value        Reference Range Interpretation Comments

 

             Split Point Rapid (test code = Split 0.6 min                       

         



             Point Rapid)                                        



[facility] CenterVbbtyhwPCNRXQINXL5068-79-67 10:50:01





             Test Item    Value        Reference Range Interpretation Comments

 

             R-time Rapid (test code = R-time 0.9 min      0.4-0.7              

     



             Rapid)                                              



[facility] CenterDmgcymfBUFOKDRGIC0578-01-39 10:50:01





             Test Item    Value        Reference Range Interpretation Comments

 

             K-time Rapid (test code = K-time 1.4 min      0.6-2.3              

     



             Rapid)                                              



[facility] CenterKpjlkflJPWSYZPCJW0473-39-65 10:50:01





             Test Item    Value        Reference Range Interpretation Comments

 

             Angle Rapid (test code = Angle 71 degrees   64-80                  

   



             Rapid)                                              



[facility] CenterPtvnzqkOEBDFWSUMP4151-28-99 10:50:01





             Test Item    Value        Reference Range Interpretation Comments

 

             G-value Rapid (test code = G-value 12.7         5.0-11.6           

       



             Rapid)                                              



[facility] CenterPxivvgrNYHWNEBYTF0300-83-77 10:50:01





             Test Item    Value        Reference Range Interpretation Comments

 

             Max Amplitude Rapid (test code = Max 72 mm        52-71            

         



             Amplitude Rapid)                                        



[facility] CenterHtbmeerCGXBETUCGJ1161-28-04 10:50:01





             Test Item    Value        Reference Range Interpretation Comments

 

             Estimated % Lysis Rapid 0.1          See_Comment                [Au

tomated message] The



             (test code = Estimated                                        syste

m which generated



             % Lysis Rapid)                                        this result t

ransmitted



                                                                 reference range

: <=7.5.



                                                                 The reference r

zhen was



                                                                 not used to int

erpret



                                                                 this result as



                                                                 normal/abnormal

.



[facility] CenterAnqhtyePWYVGEXBSL7873-29-24 10:50:01





             Test Item    Value        Reference Range Interpretation Comments

 

             Platelet (test code = Platelet) 367          133-450               

    



[facility] CenterOcywmeqQXKUNIMUNQ9009-96-55 10:50:01





             Test Item    Value        Reference Range Interpretation Comments

 

             MPV (test code = MPV) 7.8          7.4-10.4                  



[facility] CenterAruuccvPVMVFCZLEU4707-18-26 10:50:01





             Test Item    Value        Reference Range Interpretation Comments

 

             MCH (test code = MCH) 27.4 pg      27.0-31.0                 



[facility] CenterDcmtkcbQLTFNXHECZ0664-24-32 10:50:01





             Test Item    Value        Reference Range Interpretation Comments

 

             MCV (test code = MCV) 80.7         80.0-94.0                 



[facility] CenterUgqxantXMUPLZYHDS8683-00-29 10:50:01





             Test Item    Value        Reference Range Interpretation Comments

 

             MCHC (test code = MCHC) 34.0         32.0-36.0                 



[facility] CenterYvkalenTZQWRRGQHK0264-86-81 10:50:01





             Test Item    Value        Reference Range Interpretation Comments

 

             RDW (test code = RDW) 18.9         11.5-14.5                 



[facility] CenterGwphrmlSPXWENVMIM7064-22-02 10:50:01





             Test Item    Value        Reference Range Interpretation Comments

 

             Hct (test code = Hct) 43.3         42.0-54.0                 



[facility] CenterWycqjeeSDUQZFZJMF7662-31-70 10:50:01





             Test Item    Value        Reference Range Interpretation Comments

 

             WBC (test code = WBC) 9.3          3.7-10.4                  



[facility] CenterPjzrfqtIAFYSQAAEO5973-69-80 10:50:01





             Test Item    Value        Reference Range Interpretation Comments

 

             Hgb (test code = Hgb) 14.7         14.0-18.0                 



[facility] CenterXmlhtlrBNNNYGSSUB2858-68-79 10:50:01





             Test Item    Value        Reference Range Interpretation Comments

 

             RBC (test code = RBC) 5.36         4.70-6.10                 



[facility] CenterLfbfyxhAZNINNJQLA8389-52-19 10:50:01





             Test Item    Value        Reference Range Interpretation Comments

 

             Eosinophils # (test code 0.2          See_Comment                [A

utomated message] The



             = Eosinophils #)                                        system whic

h generated



                                                                 this result tra

nsmitted



                                                                 reference range

: <=0.5.



                                                                 The reference r

zhen was



                                                                 not used to int

erpret



                                                                 this result as



                                                                 normal/abnormal

.



[facility] CenterSnkasieBJZVKVAAYH9355-00-16 10:50:01





             Test Item    Value        Reference Range Interpretation Comments

 

             Basophils # (test code 0.1          See_Comment                [Aut

omated message] The



             = Basophils #)                                        system which 

generated



                                                                 this result tra

nsmitted



                                                                 reference range

: <=0.2.



                                                                 The reference r

zhen was



                                                                 not used to int

erpret



                                                                 this result as



                                                                 normal/abnormal

.



[facility] CenterKvvtomfKMKNLJVZUY8666-10-87 10:50:01





             Test Item    Value        Reference Range Interpretation Comments

 

             Lymphocytes # (test code = Lymphocytes 1.8          1.0-5.5        

           



             #)                                                  



[facility] CenterEynbcofGLNQGROSOG8079-74-62 10:50:01





             Test Item    Value        Reference Range Interpretation Comments

 

             Monocytes # (test code 0.9          See_Comment                [Aut

omated message] The



             = Monocytes #)                                        system which 

generated



                                                                 this result tra

nsmitted



                                                                 reference range

: <=0.8.



                                                                 The reference r

zhen was



                                                                 not used to int

erpret



                                                                 this result as



                                                                 normal/abnormal

.



[facility] CenterEhwcjvnUVSSOAXYPV3169-04-07 10:50:01





             Test Item    Value        Reference Range Interpretation Comments

 

             Neutrophils # (test code = Neutrophils 6.3          1.5-8.1        

           



             #)                                                  



[facility] CenterBrxzhyzBIUUYNLRDI3139-47-94 10:50:01





             Test Item    Value        Reference Range Interpretation Comments

 

             Eosinophils (test code = 2.0          See_Comment                [A

utomated message] The



             Eosinophils)                                        system which ge

nerated



                                                                 this result tra

nsmitted



                                                                 reference range

: <=4.0.



                                                                 The reference r

zhen was



                                                                 not used to int

erpret



                                                                 this result as



                                                                 normal/abnormal

.



[facility] CenterXilccyvQYADYTBTCR5953-34-10 10:50:01





             Test Item    Value        Reference Range Interpretation Comments

 

             Segs (test code = Segs) 67.4         45.0-75.0                 



[facility] CenterZsqgeikKCXBPRPUQQ6799-85-88 10:50:01





             Test Item    Value        Reference Range Interpretation Comments

 

             Lymphocytes (test code = Lymphocytes) 19.9         20.0-40.0       

          



[facility] CenterGnsclqeDUEZNGCUEW0685-21-89 10:50:01





             Test Item    Value        Reference Range Interpretation Comments

 

             Basophils (test code = 1.0          See_Comment                [Aut

omated message] The



             Basophils)                                          system which ge

nerated



                                                                 this result tra

nsmitted



                                                                 reference range

: <=1.0.



                                                                 The reference r

zhen was



                                                                 not used to int

erpret



                                                                 this result as



                                                                 normal/abnormal

.



[facility] CenterZokkrqcLLSFLLHBSD8711-55-36 10:50:01





             Test Item    Value        Reference Range Interpretation Comments

 

             Monocytes (test code = Monocytes) 9.7          2.0-12.0            

      



[facility]2018-11-08 10:50:01





             Test Item    Value        Reference Range Interpretation Comments

 

             eGFR (test code = eGFR) 133                                    



[facility]2018-11-08 10:50:01





             Test Item    Value        Reference Range Interpretation Comments

 

             Calcium Lvl (test code = Calcium Lvl) 9.4          8.5-10.5        

          



[facility]2018-11-08 10:50:01





             Test Item    Value        Reference Range Interpretation Comments

 

             CO2 (test code = CO2) 28           24-32                     



[facility]2018-11-08 10:50:01





             Test Item    Value        Reference Range Interpretation Comments

 

             BUN (test code = BUN) 14           7-22                      



[facility]2018-11-08 10:50:01





             Test Item    Value        Reference Range Interpretation Comments

 

             Glucose Lvl (test code = Glucose Lvl) 89           70-99           

          



[facility]2018-11-08 10:50:01





             Test Item    Value        Reference Range Interpretation Comments

 

             Chloride Lvl (test code = Chloride Lvl) 104                  

            



[facility]2018-11-08 10:50:01





             Test Item    Value        Reference Range Interpretation Comments

 

             Potassium Lvl (test code = Potassium 4.2          3.5-5.1          

         



             Lvl)                                                



[facility]2018-11-08 10:50:01





             Test Item    Value        Reference Range Interpretation Comments

 

             Sodium Lvl (test code = Sodium Lvl) 137          135-145           

        



[facility]2018-11-08 10:50:01





             Test Item    Value        Reference Range Interpretation Comments

 

             Creatinine Lvl (test code = Creatinine 0.85         0.50-1.40      

           



             Lvl)                                                



[facility]2018-11-08 10:50:01





             Test Item    Value        Reference Range Interpretation Comments

 

             AGAP (test code = AGAP) 9.2          10.0-20.0                 



[facility] CenterMrpbvkqTNHTXKBWJO9906-76-69 10:50:01





             Test Item    Value        Reference Range Interpretation Comments

 

             ACT (TEG) Rapid (test code = ACT (TEG) 136 s                 

           



             Rapid)                                              



[facility] CenterDbjjbvcFEUMCJTTTA3662-65-42 10:50:01





             Test Item    Value        Reference Range Interpretation Comments

 

             Split Point Rapid (test code = Split 0.6 min                       

         



             Point Rapid)                                        



[facility] CenterEvyfwcaEYZNNAQVNN2977-31-38 10:50:01





             Test Item    Value        Reference Range Interpretation Comments

 

             R-time Rapid (test code = R-time 0.9 min      0.4-0.7              

     



             Rapid)                                              



[facility] CenterTpoelfiPXLNAZQGZL9647-15-23 10:50:01





             Test Item    Value        Reference Range Interpretation Comments

 

             K-time Rapid (test code = K-time 1.4 min      0.6-2.3              

     



             Rapid)                                              



[facility] CenterRjunyxvLSPOBVAAYI0386-91-45 10:50:01





             Test Item    Value        Reference Range Interpretation Comments

 

             Angle Rapid (test code = Angle 71 degrees   64-80                  

   



             Rapid)                                              



[facility] CenterMhhaerxRFIEBIMSAP3335-97-49 10:50:01





             Test Item    Value        Reference Range Interpretation Comments

 

             G-value Rapid (test code = G-value 12.7         5.0-11.6           

       



             Rapid)                                              



[facility] CenterAtqutevPFYDDJPDAN7142-56-16 10:50:01





             Test Item    Value        Reference Range Interpretation Comments

 

             Max Amplitude Rapid (test code = Max 72 mm        52-71            

         



             Amplitude Rapid)                                        



[facility] CenterTjxbtsbVVXTZCGXCP3826-48-90 10:50:01





             Test Item    Value        Reference Range Interpretation Comments

 

             Estimated % Lysis Rapid 0.1          See_Comment                [Au

tomated message] The



             (test code = Estimated                                        syste

m which generated



             % Lysis Rapid)                                        this result t

ransmitted



                                                                 reference range

: <=7.5.



                                                                 The reference r

zhen was



                                                                 not used to int

erpret



                                                                 this result as



                                                                 normal/abnormal

.



[facility] CenterWyucoqnFULPOTZPJA8867-87-72 10:50:01





             Test Item    Value        Reference Range Interpretation Comments

 

             Platelet (test code = Platelet) 367          133-450               

    



[facility] CenterLexemdmSTSIZHLVDQ1144-90-63 10:50:01





             Test Item    Value        Reference Range Interpretation Comments

 

             MPV (test code = MPV) 7.8          7.4-10.4                  



[facility] CenterRyooqezJCJVJQIQXN9004-31-16 10:50:01





             Test Item    Value        Reference Range Interpretation Comments

 

             MCH (test code = MCH) 27.4 pg      27.0-31.0                 



[facility] CenterXxjyonwYDEZNFEUQU8345-32-66 10:50:01





             Test Item    Value        Reference Range Interpretation Comments

 

             MCV (test code = MCV) 80.7         80.0-94.0                 



[facility] CenterUgnssvlQTAVRBUEDJ5246-66-70 10:50:01





             Test Item    Value        Reference Range Interpretation Comments

 

             MCHC (test code = MCHC) 34.0         32.0-36.0                 



[facility] CenterRbewqekQKOXVMSUWE8435-81-76 10:50:01





             Test Item    Value        Reference Range Interpretation Comments

 

             RDW (test code = RDW) 18.9         11.5-14.5                 



[facility] CenterXzlurbhUFAKAJYVAA0264-46-34 10:50:01





             Test Item    Value        Reference Range Interpretation Comments

 

             Hct (test code = Hct) 43.3         42.0-54.0                 



[facility] CenterIypdvtuVVIDTZMDSQ1722-37-64 10:50:01





             Test Item    Value        Reference Range Interpretation Comments

 

             WBC (test code = WBC) 9.3          3.7-10.4                  



[facility] CenterXwbipsgSRKPKXSYZV4972-37-95 10:50:01





             Test Item    Value        Reference Range Interpretation Comments

 

             Hgb (test code = Hgb) 14.7         14.0-18.0                 



[facility] CenterLicvkdrXFIEZLKNIJ5219-63-00 10:50:01





             Test Item    Value        Reference Range Interpretation Comments

 

             RBC (test code = RBC) 5.36         4.70-6.10                 



[facility] CenterZpluiebEKUOGQGCJQ3964-50-17 10:50:01





             Test Item    Value        Reference Range Interpretation Comments

 

             Eosinophils # (test code 0.2          See_Comment                [A

utomated message] The



             = Eosinophils #)                                        system whic

h generated



                                                                 this result tra

nsmitted



                                                                 reference range

: <=0.5.



                                                                 The reference r

zhen was



                                                                 not used to int

erpret



                                                                 this result as



                                                                 normal/abnormal

.



[facility] CenterOataelqIWBQWTWLIB2309-05-32 10:50:01





             Test Item    Value        Reference Range Interpretation Comments

 

             Basophils # (test code 0.1          See_Comment                [Aut

omated message] The



             = Basophils #)                                        system which 

generated



                                                                 this result tra

nsmitted



                                                                 reference range

: <=0.2.



                                                                 The reference r

zhen was



                                                                 not used to int

erpret



                                                                 this result as



                                                                 normal/abnormal

.



[facility] CenterEetpqifILMLALEZWF3671-85-64 10:50:01





             Test Item    Value        Reference Range Interpretation Comments

 

             Lymphocytes # (test code = Lymphocytes 1.8          1.0-5.5        

           



             #)                                                  



[facility] CenterQkexqsvZZNGCBQXBI3982-52-79 10:50:01





             Test Item    Value        Reference Range Interpretation Comments

 

             Monocytes # (test code 0.9          See_Comment                [Aut

omated message] The



             = Monocytes #)                                        system which 

generated



                                                                 this result tra

nsmitted



                                                                 reference range

: <=0.8.



                                                                 The reference r

zhen was



                                                                 not used to int

erpret



                                                                 this result as



                                                                 normal/abnormal

.



[facility] CenterMgohyzoJPQLLQVKDJ1481-27-58 10:50:01





             Test Item    Value        Reference Range Interpretation Comments

 

             Neutrophils # (test code = Neutrophils 6.3          1.5-8.1        

           



             #)                                                  



[facility] CenterGqnesijHEWXYTBRJK3094-47-00 10:50:01





             Test Item    Value        Reference Range Interpretation Comments

 

             Eosinophils (test code = 2.0          See_Comment                [A

utomated message] The



             Eosinophils)                                        system which ge

nerated



                                                                 this result tra

nsmitted



                                                                 reference range

: <=4.0.



                                                                 The reference r

zhen was



                                                                 not used to int

erpret



                                                                 this result as



                                                                 normal/abnormal

.



[facility] CenterBncoiamUZJHUAMHHC4601-90-28 10:50:01





             Test Item    Value        Reference Range Interpretation Comments

 

             Segs (test code = Segs) 67.4         45.0-75.0                 



[facility] CenterYbhzodqPRBZNOEIRC5144-25-23 10:50:01





             Test Item    Value        Reference Range Interpretation Comments

 

             Lymphocytes (test code = Lymphocytes) 19.9         20.0-40.0       

          



[facility] CenterTfybwzkRJIZOIPUGQ3436-57-87 10:50:01





             Test Item    Value        Reference Range Interpretation Comments

 

             Basophils (test code = 1.0          See_Comment                [Aut

omated message] The



             Basophils)                                          system which ge

nerated



                                                                 this result tra

nsmitted



                                                                 reference range

: <=1.0.



                                                                 The reference r

zhen was



                                                                 not used to int

erpret



                                                                 this result as



                                                                 normal/abnormal

.



[facility] CenterLfxlgdfMDLMVFXXLK4600-74-15 10:50:01





             Test Item    Value        Reference Range Interpretation Comments

 

             Monocytes (test code = Monocytes) 9.7          2.0-12.0            

      



[facility]2018-11-08 10:50:01





             Test Item    Value        Reference Range Interpretation Comments

 

             eGFR (test code = eGFR) 133                                    



[facility]2018-11-08 10:50:01





             Test Item    Value        Reference Range Interpretation Comments

 

             Calcium Lvl (test code = Calcium Lvl) 9.4          8.5-10.5        

          



[facility]2018-11-08 10:50:01





             Test Item    Value        Reference Range Interpretation Comments

 

             CO2 (test code = CO2) 28           24-32                     



[facility]2018-11-08 10:50:01





             Test Item    Value        Reference Range Interpretation Comments

 

             BUN (test code = BUN) 14           7-22                      



[facility]2018-11-08 10:50:01





             Test Item    Value        Reference Range Interpretation Comments

 

             Glucose Lvl (test code = Glucose Lvl) 89           70-99           

          



[facility]2018-11-08 10:50:01





             Test Item    Value        Reference Range Interpretation Comments

 

             Chloride Lvl (test code = Chloride Lvl) 104                  

            



[facility]2018-11-08 10:50:01





             Test Item    Value        Reference Range Interpretation Comments

 

             Potassium Lvl (test code = Potassium 4.2          3.5-5.1          

         



             Lvl)                                                



[facility]2018-11-08 10:50:01





             Test Item    Value        Reference Range Interpretation Comments

 

             Sodium Lvl (test code = Sodium Lvl) 137          135-145           

        



[facility]2018-11-08 10:50:01





             Test Item    Value        Reference Range Interpretation Comments

 

             Creatinine Lvl (test code = Creatinine 0.85         0.50-1.40      

           



             Lvl)                                                



[facility]2018-11-08 10:50:01





             Test Item    Value        Reference Range Interpretation Comments

 

             AGAP (test code = AGAP) 9.2          10.0-20.0                 



[facility] CenterLtitexbLAQLJBQMNU4185-93-87 10:50:01





             Test Item    Value        Reference Range Interpretation Comments

 

             ACT (TEG) Rapid (test code = ACT (TEG) 136 s                 

           



             Rapid)                                              



[facility] CenterVrybmepPUMMLNPVXK8501-22-36 10:50:01





             Test Item    Value        Reference Range Interpretation Comments

 

             Split Point Rapid (test code = Split 0.6 min                       

         



             Point Rapid)                                        



[facility] CenterVzyfwqrDRHERTXUUI8439-12-45 10:50:01





             Test Item    Value        Reference Range Interpretation Comments

 

             R-time Rapid (test code = R-time 0.9 min      0.4-0.7              

     



             Rapid)                                              



[facility] CenterJnyuytcBBICJFXJIK7668-78-87 10:50:01





             Test Item    Value        Reference Range Interpretation Comments

 

             K-time Rapid (test code = K-time 1.4 min      0.6-2.3              

     



             Rapid)                                              



[facility] CenterJijhgmnDGTOSTRVAR5461-22-41 10:50:01





             Test Item    Value        Reference Range Interpretation Comments

 

             Angle Rapid (test code = Angle 71 degrees   64-80                  

   



             Rapid)                                              



[facility] CenterMqgsfroJLVHKJRZGE0514-79-46 10:50:01





             Test Item    Value        Reference Range Interpretation Comments

 

             G-value Rapid (test code = G-value 12.7         5.0-11.6           

       



             Rapid)                                              



[facility] CenterIpqcsujMROJJNOGPR3240-25-58 10:50:01





             Test Item    Value        Reference Range Interpretation Comments

 

             Max Amplitude Rapid (test code = Max 72 mm        52-71            

         



             Amplitude Rapid)                                        



[facility] CenterYogyaojEADRALIZXW9805-42-37 10:50:01





             Test Item    Value        Reference Range Interpretation Comments

 

             Estimated % Lysis Rapid 0.1          See_Comment                [Au

tomated message] The



             (test code = Estimated                                        syste

m which generated



             % Lysis Rapid)                                        this result t

ransmitted



                                                                 reference range

: <=7.5.



                                                                 The reference r

zhen was



                                                                 not used to int

erpret



                                                                 this result as



                                                                 normal/abnormal

.



[facility] CenterLyqypafWJDXRTGILM8650-50-20 10:50:01





             Test Item    Value        Reference Range Interpretation Comments

 

             Platelet (test code = Platelet) 367          133-450               

    



[facility] CenterPupuiqoWMBVIFGASL8358-01-54 10:50:01





             Test Item    Value        Reference Range Interpretation Comments

 

             MPV (test code = MPV) 7.8          7.4-10.4                  



[facility] CenterHqhitbrCUCJDGNPBC0519-84-98 10:50:01





             Test Item    Value        Reference Range Interpretation Comments

 

             MCH (test code = MCH) 27.4 pg      27.0-31.0                 



[facility] CenterNdvqhdiKQMFQBGPHN1779-37-50 10:50:01





             Test Item    Value        Reference Range Interpretation Comments

 

             MCV (test code = MCV) 80.7         80.0-94.0                 



[facility] CenterXhmgnyhAGCWVAWOSQ2491-60-48 10:50:01





             Test Item    Value        Reference Range Interpretation Comments

 

             MCHC (test code = MCHC) 34.0         32.0-36.0                 



[facility]BntvgymNJZEOARNQE0403-[facility]-24 10:50:01





             Test Item    Value        Reference Range Interpretation Comments

 

             RDW (test code = RDW) 18.9         11.5-14.5                 



[facility] CenterAtkavdsSAMVVRUSSO8101-00-52 10:50:01





             Test Item    Value        Reference Range Interpretation Comments

 

             Hct (test code = Hct) 43.3         42.0-54.0                 



[facility] CenterIrhryklBEFMCIUVIP9736-77-25 10:50:01





             Test Item    Value        Reference Range Interpretation Comments

 

             WBC (test code = WBC) 9.3          3.7-10.4                  



[facility] CenterRclxydcGUZDOSXKOK7352-00-69 10:50:01





             Test Item    Value        Reference Range Interpretation Comments

 

             Hgb (test code = Hgb) 14.7         14.0-18.0                 



[facility] CenterDbuabhyFQZXNWZDDX8049-34-33 10:50:01





             Test Item    Value        Reference Range Interpretation Comments

 

             RBC (test code = RBC) 5.36         4.70-6.10                 



[facility] CenterKhkdcxiLAONJEAOHX7706-64-31 10:50:01





             Test Item    Value        Reference Range Interpretation Comments

 

             Eosinophils # (test code 0.2          See_Comment                [A

utomated message] The



             = Eosinophils #)                                        system whic

h generated



                                                                 this result tra

nsmitted



                                                                 reference range

: <=0.5.



                                                                 The reference r

zhen was



                                                                 not used to int

erpret



                                                                 this result as



                                                                 normal/abnormal

.



[facility] CenterIrsbzpaEKPMFPCIDX9631-30-23 10:50:01





             Test Item    Value        Reference Range Interpretation Comments

 

             Basophils # (test code 0.1          See_Comment                [Aut

omated message] The



             = Basophils #)                                        system which 

generated



                                                                 this result tra

nsmitted



                                                                 reference range

: <=0.2.



                                                                 The reference r

zhen was



                                                                 not used to int

erpret



                                                                 this result as



                                                                 normal/abnormal

.



[facility] CenterQjvrynoTBWMCZRLBT3660-33-82 10:50:01





             Test Item    Value        Reference Range Interpretation Comments

 

             Lymphocytes # (test code = Lymphocytes 1.8          1.0-5.5        

           



             #)                                                  



[facility] CenterLqxugbeMUJZWQKCQE0211-52-60 10:50:01





             Test Item    Value        Reference Range Interpretation Comments

 

             Monocytes # (test code 0.9          See_Comment                [Aut

omated message] The



             = Monocytes #)                                        system which 

generated



                                                                 this result tra

nsmitted



                                                                 reference range

: <=0.8.



                                                                 The reference r

zhen was



                                                                 not used to int

erpret



                                                                 this result as



                                                                 normal/abnormal

.



[facility] CenterFrttuujITLYPXERAU2376-41-58 10:50:01





             Test Item    Value        Reference Range Interpretation Comments

 

             Neutrophils # (test code = Neutrophils 6.3          1.5-8.1        

           



             #)                                                  



[facility] CenterVrpiwgtHDMXITJPDH5588-00-42 10:50:01





             Test Item    Value        Reference Range Interpretation Comments

 

             Eosinophils (test code = 2.0          See_Comment                [A

utomated message] The



             Eosinophils)                                        system which ge

nerated



                                                                 this result tra

nsmitted



                                                                 reference range

: <=4.0.



                                                                 The reference r

zhen was



                                                                 not used to int

erpret



                                                                 this result as



                                                                 normal/abnormal

.



[facility] CenterNrqbpbwWTYJQTHRYE3187-30-03 10:50:01





             Test Item    Value        Reference Range Interpretation Comments

 

             Segs (test code = Segs) 67.4         45.0-75.0                 



[facility] CenterFctdziyRPPETCSVOZ6973-36-59 10:50:01





             Test Item    Value        Reference Range Interpretation Comments

 

             Lymphocytes (test code = Lymphocytes) 19.9         20.0-40.0       

          



[facility] CenterYibxyswAQORLCHJMF8025-90-56 10:50:01





             Test Item    Value        Reference Range Interpretation Comments

 

             Basophils (test code = 1.0          See_Comment                [Aut

omated message] The



             Basophils)                                          system which ge

nerated



                                                                 this result tra

nsmitted



                                                                 reference range

: <=1.0.



                                                                 The reference r

zhen was



                                                                 not used to int

erpret



                                                                 this result as



                                                                 normal/abnormal

.



[facility] CenterGjjtbxeXFCRMIYWCY2037-62-20 10:50:01





             Test Item    Value        Reference Range Interpretation Comments

 

             Monocytes (test code = Monocytes) 9.7          2.0-12.0            

      



[facility]2018-11-08 10:50:01





             Test Item    Value        Reference Range Interpretation Comments

 

             eGFR (test code = eGFR) 133                                    



[facility]2018-11-08 10:50:01





             Test Item    Value        Reference Range Interpretation Comments

 

             Calcium Lvl (test code = Calcium Lvl) 9.4          8.5-10.5        

          



[facility]2018-11-08 10:50:01





             Test Item    Value        Reference Range Interpretation Comments

 

             CO2 (test code = CO2) 28           24-32                     



[facility]2018-11-08 10:50:01





             Test Item    Value        Reference Range Interpretation Comments

 

             BUN (test code = BUN) 14           7-22                      



[facility]2018-11-08 10:50:01





             Test Item    Value        Reference Range Interpretation Comments

 

             Glucose Lvl (test code = Glucose Lvl) 89           70-99           

          



[facility]2018-11-08 10:50:01





             Test Item    Value        Reference Range Interpretation Comments

 

             Chloride Lvl (test code = Chloride Lvl) 104                  

            



[facility]2018-11-08 10:50:01





             Test Item    Value        Reference Range Interpretation Comments

 

             Potassium Lvl (test code = Potassium 4.2          3.5-5.1          

         



             Lvl)                                                



[facility]2018-11-08 10:50:01





             Test Item    Value        Reference Range Interpretation Comments

 

             Sodium Lvl (test code = Sodium Lvl) 137          135-145           

        



[facility]2018-11-08 10:50:01





             Test Item    Value        Reference Range Interpretation Comments

 

             Creatinine Lvl (test code = Creatinine 0.85         0.50-1.40      

           



             Lvl)                                                



[facility]2018-11-08 10:50:01





             Test Item    Value        Reference Range Interpretation Comments

 

             AGAP (test code = AGAP) 9.2          10.0-20.0                 



[facility] CenterGuisdluJMCFWQPDJC6065-40-78 10:50:01





             Test Item    Value        Reference Range Interpretation Comments

 

             ACT (TEG) Rapid (test code = ACT (TEG) 136 s                 

           



             Rapid)                                              



[facility] CenterAgrxrknRSKNBUHQKR0705-95-25 10:50:01





             Test Item    Value        Reference Range Interpretation Comments

 

             Split Point Rapid (test code = Split 0.6 min                       

         



             Point Rapid)                                        



[facility] CenterGqldfpzGKMPJQUXWO3880-20-86 10:50:01





             Test Item    Value        Reference Range Interpretation Comments

 

             R-time Rapid (test code = R-time 0.9 min      0.4-0.7              

     



             Rapid)                                              



[facility] CenterAyhshxiSILGESJHNV1859-76-08 10:50:01





             Test Item    Value        Reference Range Interpretation Comments

 

             K-time Rapid (test code = K-time 1.4 min      0.6-2.3              

     



             Rapid)                                              



[facility] CenterCgotlqyHWLBCHGAAB5562-72-27 10:50:01





             Test Item    Value        Reference Range Interpretation Comments

 

             Angle Rapid (test code = Angle 71 degrees   64-80                  

   



             Rapid)                                              



[facility] CenterBjeaaomIYWOGBWSBA1405-23-04 10:50:01





             Test Item    Value        Reference Range Interpretation Comments

 

             G-value Rapid (test code = G-value 12.7         5.0-11.6           

       



             Rapid)                                              



[facility] CenterSuncuelYQNJMPITAV7565-84-45 10:50:01





             Test Item    Value        Reference Range Interpretation Comments

 

             Max Amplitude Rapid (test code = Max 72 mm        52-71            

         



             Amplitude Rapid)                                        



[facility] CenterKdjffsyRQSPUVTCEE2875-78-19 10:50:01





             Test Item    Value        Reference Range Interpretation Comments

 

             Estimated % Lysis Rapid 0.1          See_Comment                [Au

tomated message] The



             (test code = Estimated                                        syste

m which generated



             % Lysis Rapid)                                        this result t

ransmitted



                                                                 reference range

: <=7.5.



                                                                 The reference r

zhen was



                                                                 not used to int

erpret



                                                                 this result as



                                                                 normal/abnormal

.



[facility] CenterShdcognSOTGJCVQFD2427-63-93 10:50:01





             Test Item    Value        Reference Range Interpretation Comments

 

             Platelet (test code = Platelet) 367          133-450               

    



[facility] CenterTvirztfBHOGUVRDIS3152-90-42 10:50:01





             Test Item    Value        Reference Range Interpretation Comments

 

             MPV (test code = MPV) 7.8          7.4-10.4                  



[facility] CenterWtbhetlAKKWZRXNOD6438-43-23 10:50:01





             Test Item    Value        Reference Range Interpretation Comments

 

             MCH (test code = MCH) 27.4 pg      27.0-31.0                 



[facility] CenterRfgfajbHVVJAGWNAN0628-43-54 10:50:01





             Test Item    Value        Reference Range Interpretation Comments

 

             MCV (test code = MCV) 80.7         80.0-94.0                 



[facility] CenterJaeekqrRCVBDMLEUI8333-41-10 10:50:01





             Test Item    Value        Reference Range Interpretation Comments

 

             MCHC (test code = MCHC) 34.0         32.0-36.0                 



[facility] CenterEnivcytJWUALYIAYP9583-16-41 10:50:01





             Test Item    Value        Reference Range Interpretation Comments

 

             RDW (test code = RDW) 18.9         11.5-14.5                 



[facility] CenterXhsjjbqQBUOQMKDBY4752-35-19 10:50:01





             Test Item    Value        Reference Range Interpretation Comments

 

             Hct (test code = Hct) 43.3         42.0-54.0                 



[facility] CenterZjxqbqiJSYXRORKWX0400-88-63 10:50:01





             Test Item    Value        Reference Range Interpretation Comments

 

             WBC (test code = WBC) 9.3          3.7-10.4                  



[facility] CenterXlyubzpSPJIQLISWV6216-01-95 10:50:01





             Test Item    Value        Reference Range Interpretation Comments

 

             Hgb (test code = Hgb) 14.7         14.0-18.0                 



[facility] CenterMjpfhctHPUUHJKOKP8901-76-20 10:50:01





             Test Item    Value        Reference Range Interpretation Comments

 

             RBC (test code = RBC) 5.36         4.70-6.10                 



[facility] CenterYbvgtokVRBBCIMZRG4354-47-91 10:50:01





             Test Item    Value        Reference Range Interpretation Comments

 

             Eosinophils # (test code 0.2          See_Comment                [A

utomated message] The



             = Eosinophils #)                                        system whic

h generated



                                                                 this result tra

nsmitted



                                                                 reference range

: <=0.5.



                                                                 The reference r

zhen was



                                                                 not used to int

erpret



                                                                 this result as



                                                                 normal/abnormal

.



[facility] CenterOhlbnewQSGNKMDWET3285-15-63 10:50:01





             Test Item    Value        Reference Range Interpretation Comments

 

             Basophils # (test code 0.1          See_Comment                [Aut

omated message] The



             = Basophils #)                                        system which 

generated



                                                                 this result tra

nsmitted



                                                                 reference range

: <=0.2.



                                                                 The reference r

zhen was



                                                                 not used to int

erpret



                                                                 this result as



                                                                 normal/abnormal

.



[facility] CenterAvxatrxMQAAWTQHRL6202-56-42 10:50:01





             Test Item    Value        Reference Range Interpretation Comments

 

             Lymphocytes # (test code = Lymphocytes 1.8          1.0-5.5        

           



             #)                                                  



[facility] CenterBshanwqUTBCXLJBMI5036-54-42 10:50:01





             Test Item    Value        Reference Range Interpretation Comments

 

             Monocytes # (test code 0.9          See_Comment                [Aut

omated message] The



             = Monocytes #)                                        system which 

generated



                                                                 this result tra

nsmitted



                                                                 reference range

: <=0.8.



                                                                 The reference r

zhen was



                                                                 not used to int

erpret



                                                                 this result as



                                                                 normal/abnormal

.



[facility] CenterDjlicqwAVKODPBXTU7647-56-29 10:50:01





             Test Item    Value        Reference Range Interpretation Comments

 

             Neutrophils # (test code = Neutrophils 6.3          1.5-8.1        

           



             #)                                                  



[facility] CenterAsycmelCXXTOPZEFL6302-88-71 10:50:01





             Test Item    Value        Reference Range Interpretation Comments

 

             Eosinophils (test code = 2.0          See_Comment                [A

utomated message] The



             Eosinophils)                                        system which ge

nerated



                                                                 this result tra

nsmitted



                                                                 reference range

: <=4.0.



                                                                 The reference r

zhen was



                                                                 not used to int

erpret



                                                                 this result as



                                                                 normal/abnormal

.



[facility] CenterUqvqdgdZTKEMVBRCO7466-66-84 10:50:01





             Test Item    Value        Reference Range Interpretation Comments

 

             Segs (test code = Segs) 67.4         45.0-75.0                 



[facility] CenterIozjjxxEYKDOMBUNS2759-33-46 10:50:01





             Test Item    Value        Reference Range Interpretation Comments

 

             Lymphocytes (test code = Lymphocytes) 19.9         20.0-40.0       

          



[facility] CenterWamistjFZNCSVJJEL2565-45-45 10:50:01





             Test Item    Value        Reference Range Interpretation Comments

 

             Basophils (test code = 1.0          See_Comment                [Aut

omated message] The



             Basophils)                                          system which ge

nerated



                                                                 this result tra

nsmitted



                                                                 reference range

: <=1.0.



                                                                 The reference r

zhen was



                                                                 not used to int

erpret



                                                                 this result as



                                                                 normal/abnormal

.



[facility] CenterLdcurzsJPNWBZKJQA4048-10-75 10:50:01





             Test Item    Value        Reference Range Interpretation Comments

 

             Monocytes (test code = Monocytes) 9.7          2.0-12.0            

      



[facility]2018-11-08 10:50:01





             Test Item    Value        Reference Range Interpretation Comments

 

             eGFR (test code = eGFR) 133                                    



[facility]2018-11-08 10:50:01





             Test Item    Value        Reference Range Interpretation Comments

 

             Calcium Lvl (test code = Calcium Lvl) 9.4          8.5-10.5        

          



[facility]2018-11-08 10:50:01





             Test Item    Value        Reference Range Interpretation Comments

 

             CO2 (test code = CO2) 28           24-32                     



[facility]2018-11-08 10:50:01





             Test Item    Value        Reference Range Interpretation Comments

 

             BUN (test code = BUN) 14           7-22                      



[facility]2018-11-08 10:50:01





             Test Item    Value        Reference Range Interpretation Comments

 

             Glucose Lvl (test code = Glucose Lvl) 89           70-99           

          



[facility]2018-11-08 10:50:01





             Test Item    Value        Reference Range Interpretation Comments

 

             Chloride Lvl (test code = Chloride Lvl) 104                  

            



[facility]2018-11-08 10:50:01





             Test Item    Value        Reference Range Interpretation Comments

 

             Potassium Lvl (test code = Potassium 4.2          3.5-5.1          

         



             Lvl)                                                



[facility]2018-11-08 10:50:01





             Test Item    Value        Reference Range Interpretation Comments

 

             Sodium Lvl (test code = Sodium Lvl) 137          135-145           

        



[facility]2018-11-08 10:50:01





             Test Item    Value        Reference Range Interpretation Comments

 

             Creatinine Lvl (test code = Creatinine 0.85         0.50-1.40      

           



             Lvl)                                                



[facility]2018-11-08 10:50:01





             Test Item    Value        Reference Range Interpretation Comments

 

             AGAP (test code = AGAP) 9.2          10.0-20.0                 



[facility] CenterWhoudnuPTNPOJGYMD1002-73-68 10:50:01





             Test Item    Value        Reference Range Interpretation Comments

 

             ACT (TEG) Rapid (test code = ACT (TEG) 136 s                 

           



             Rapid)                                              



[facility] CenterQijuxdvZBCJYPETTT8957-57-72 10:50:01





             Test Item    Value        Reference Range Interpretation Comments

 

             Split Point Rapid (test code = Split 0.6 min                       

         



             Point Rapid)                                        



[facility] CenterFimlvkcPZMZXJSLZU8674-94-81 10:50:01





             Test Item    Value        Reference Range Interpretation Comments

 

             R-time Rapid (test code = R-time 0.9 min      0.4-0.7              

     



             Rapid)                                              



[facility] CenterHygurqxXLQMMREECU8944-66-81 10:50:01





             Test Item    Value        Reference Range Interpretation Comments

 

             K-time Rapid (test code = K-time 1.4 min      0.6-2.3              

     



             Rapid)                                              



[facility] CenterHwewpohXUTFRNFAAB1571-79-85 10:50:01





             Test Item    Value        Reference Range Interpretation Comments

 

             Angle Rapid (test code = Angle 71 degrees   64-80                  

   



             Rapid)                                              



[facility] CenterOtzhuhgOCGGXOOCZS5630-86-58 10:50:01





             Test Item    Value        Reference Range Interpretation Comments

 

             G-value Rapid (test code = G-value 12.7         5.0-11.6           

       



             Rapid)                                              



[facility] CenterMpablexOEQVYTRXQE4189-56-50 10:50:01





             Test Item    Value        Reference Range Interpretation Comments

 

             Max Amplitude Rapid (test code = Max 72 mm        52-71            

         



             Amplitude Rapid)                                        



[facility] CenterSrpctxoUTMGILZMTS8503-54-57 10:50:01





             Test Item    Value        Reference Range Interpretation Comments

 

             Estimated % Lysis Rapid 0.1          See_Comment                [Au

tomated message] The



             (test code = Estimated                                        syste

m which generated



             % Lysis Rapid)                                        this result t

ransmitted



                                                                 reference range

: <=7.5.



                                                                 The reference r

zhen was



                                                                 not used to int

erpret



                                                                 this result as



                                                                 normal/abnormal

.



[facility] CenterFzzyvqdQOCBPISRYS4015-76-21 10:50:01





             Test Item    Value        Reference Range Interpretation Comments

 

             Platelet (test code = Platelet) 367          133-450               

    



[facility] CenterGmlxkuqAONSBFMIGJ2824-79-54 10:50:01





             Test Item    Value        Reference Range Interpretation Comments

 

             MPV (test code = MPV) 7.8          7.4-10.4                  



[facility] CenterPrtywhcLYBKXQMHFN4116-17-96 10:50:01





             Test Item    Value        Reference Range Interpretation Comments

 

             MCH (test code = MCH) 27.4 pg      27.0-31.0                 



[facility] CenterTivbrhfIFJELLDPSI7530-29-25 10:50:01





             Test Item    Value        Reference Range Interpretation Comments

 

             MCV (test code = MCV) 80.7         80.0-94.0                 



[facility] CenterAsowtgnQOKZWRYGKZ9900-06-04 10:50:01





             Test Item    Value        Reference Range Interpretation Comments

 

             MCHC (test code = MCHC) 34.0         32.0-36.0                 



[facility] CenterHnfidvmFAQRPTAFGL3239-06-25 10:50:01





             Test Item    Value        Reference Range Interpretation Comments

 

             RDW (test code = RDW) 18.9         11.5-14.5                 



[facility] CenterIqmgzdaFOHSXJJSGN5978-46-17 10:50:01





             Test Item    Value        Reference Range Interpretation Comments

 

             Hct (test code = Hct) 43.3         42.0-54.0                 



[facility] CenterVgwlnsvAODWVAEVSN6494-29-99 10:50:01





             Test Item    Value        Reference Range Interpretation Comments

 

             WBC (test code = WBC) 9.3          3.7-10.4                  



[facility] CenterVusdplzPEBZHADQUQ4420-48-36 10:50:01





             Test Item    Value        Reference Range Interpretation Comments

 

             Hgb (test code = Hgb) 14.7         14.0-18.0                 



[facility] CenterZaujglgWRMXGVAVCR3352-70-43 10:50:01





             Test Item    Value        Reference Range Interpretation Comments

 

             RBC (test code = RBC) 5.36         4.70-6.10                 



[facility] CenterBrexptsGOQDZFPUAD0024-53-84 10:50:01





             Test Item    Value        Reference Range Interpretation Comments

 

             Eosinophils # (test code 0.2          See_Comment                [A

utomated message] The



             = Eosinophils #)                                        system whic

h generated



                                                                 this result tra

nsmitted



                                                                 reference range

: <=0.5.



                                                                 The reference r

zhen was



                                                                 not used to int

erpret



                                                                 this result as



                                                                 normal/abnormal

.



[facility] CenterIdwuknhIPMEQPXWPV2721-03-52 10:50:01





             Test Item    Value        Reference Range Interpretation Comments

 

             Basophils # (test code 0.1          See_Comment                [Aut

omated message] The



             = Basophils #)                                        system which 

generated



                                                                 this result tra

nsmitted



                                                                 reference range

: <=0.2.



                                                                 The reference r

zhen was



                                                                 not used to int

erpret



                                                                 this result as



                                                                 normal/abnormal

.



[facility] CenterAlyybiuMYUERFWZZC3912-28-14 10:50:01





             Test Item    Value        Reference Range Interpretation Comments

 

             Lymphocytes # (test code = Lymphocytes 1.8          1.0-5.5        

           



             #)                                                  



[facility] CenterRysqypyPKOZENDMND1114-08-91 10:50:01





             Test Item    Value        Reference Range Interpretation Comments

 

             Monocytes # (test code 0.9          See_Comment                [Aut

omated message] The



             = Monocytes #)                                        system which 

generated



                                                                 this result tra

nsmitted



                                                                 reference range

: <=0.8.



                                                                 The reference r

zhen was



                                                                 not used to int

erpret



                                                                 this result as



                                                                 normal/abnormal

.



[facility] CenterZpplscbZLDSJMPTSK7544-23-19 10:50:01





             Test Item    Value        Reference Range Interpretation Comments

 

             Neutrophils # (test code = Neutrophils 6.3          1.5-8.1        

           



             #)                                                  



[facility] CenterXtfujuoYJALDQKRXW2422-20-45 10:50:01





             Test Item    Value        Reference Range Interpretation Comments

 

             Eosinophils (test code = 2.0          See_Comment                [A

utomated message] The



             Eosinophils)                                        system which ge

nerated



                                                                 this result tra

nsmitted



                                                                 reference range

: <=4.0.



                                                                 The reference r

zhen was



                                                                 not used to int

erpret



                                                                 this result as



                                                                 normal/abnormal

.



[facility] CenterJqkccgkIGAAEEACBM6407-00-27 10:50:01





             Test Item    Value        Reference Range Interpretation Comments

 

             Segs (test code = Segs) 67.4         45.0-75.0                 



[facility] CenterBnctgdjMXDILSYGTN3485-41-44 10:50:01





             Test Item    Value        Reference Range Interpretation Comments

 

             Lymphocytes (test code = Lymphocytes) 19.9         20.0-40.0       

          



[facility] CenterFoppiwrHJJOXOHJNZ9552-03-01 10:50:01





             Test Item    Value        Reference Range Interpretation Comments

 

             Basophils (test code = 1.0          See_Comment                [Aut

omated message] The



             Basophils)                                          system which ge

nerated



                                                                 this result tra

nsmitted



                                                                 reference range

: <=1.0.



                                                                 The reference r

zhen was



                                                                 not used to int

erpret



                                                                 this result as



                                                                 normal/abnormal

.



[facility] CenterFokdxxxZMUMRLLOXR6533-64-59 10:50:01





             Test Item    Value        Reference Range Interpretation Comments

 

             Monocytes (test code = Monocytes) 9.7          2.0-12.0            

      



[place] 985498-05-08 05:42:00





             Test Item    Value        Reference Range Interpretation Comments

 

             B/C Ratio (test code = B/C Ratio) 17 1         6-25                

      



[facility]2018-05-08 05:42:00





             Test Item    Value        Reference Range Interpretation Comments

 

             Globulin (test code = Globulin) 4.3          2.7-4.2               

    



[facility]2018-05-08 05:42:00





             Test Item    Value        Reference Range Interpretation Comments

 

             A/G Ratio (test code = A/G Ratio) 0.7 1        0.7-1.6             

      



[facility]2018-05-08 05:42:00





             Test Item    Value        Reference Range Interpretation Comments

 

             AGAP (test code = AGAP) 14.4         10.0-20.0                 



[facility]2018-05-08 05:42:00





             Test Item    Value        Reference Range Interpretation Comments

 

             eGFR (test code = eGFR) 113                                    



[facility]2018-05-08 05:42:00





             Test Item    Value        Reference Range Interpretation Comments

 

             Alk Phos (test code = Alk Phos) 76                           

    



[facility]2018-05-08 05:42:00





             Test Item    Value        Reference Range Interpretation Comments

 

             ALT (test code = ALT) 35           See_Comment                [Auto

mated message] The



                                                                 system which ge

nerated this



                                                                 result transmit

huma



                                                                 reference range

: <=65. The



                                                                 reference range

 was not



                                                                 used to interpr

et this



                                                                 result as zayda

l/abnormal.



[facility]2018-05-08 05:42:00





             Test Item    Value        Reference Range Interpretation Comments

 

             Albumin Lvl (test code = Albumin Lvl) 2.8          3.5-5.0         

          



[facility]2018-05-08 05:42:00





             Test Item    Value        Reference Range Interpretation Comments

 

             Total Protein (test code = Total 7.1          6.4-8.4              

     



             Protein)                                            



[facility]2018-05-08 05:42:00





             Test Item    Value        Reference Range Interpretation Comments

 

             Calcium Lvl (test code = Calcium Lvl) 8.7          8.5-10.5        

          



[facility]2018-05-08 05:42:00





             Test Item    Value        Reference Range Interpretation Comments

 

             AST (test code = AST) 18           See_Comment                [Auto

mated message] The



                                                                 system which ge

nerated this



                                                                 result transmit

huma



                                                                 reference range

: <=37. The



                                                                 reference range

 was not



                                                                 used to interpr

et this



                                                                 result as zayda

l/abnormal.



[facility]2018-05-08 05:42:00





             Test Item    Value        Reference Range Interpretation Comments

 

             Bili Total (test code = Bili Total) 0.3          0.2-1.3           

        



[place] 985498-05-08 05:42:00





             Test Item    Value        Reference Range Interpretation Comments

 

             Potassium Lvl (test code = Potassium 4.4          3.5-5.1          

         



             Lvl)                                                



[facility]2018-05-08 05:42:00





             Test Item    Value        Reference Range Interpretation Comments

 

             Chloride Lvl (test code = Chloride Lvl) 109                  

            



[facility]2018-05-08 05:42:00





             Test Item    Value        Reference Range Interpretation Comments

 

             CO2 (test code = CO2) 23           24-32                     



[place] 985498-05-08 05:42:00





             Test Item    Value        Reference Range Interpretation Comments

 

             Glucose Lvl (test code = Glucose Lvl) 114          70-99           

          



[facility]2018-05-08 05:42:00





             Test Item    Value        Reference Range Interpretation Comments

 

             Creatinine Lvl (test code = Creatinine 1.01         0.50-1.40      

           



             Lvl)                                                



[facility]2018-05-08 05:42:00





             Test Item    Value        Reference Range Interpretation Comments

 

             BUN (test code = BUN) 17           7-22                      



[facility]2018-05-08 05:42:00





             Test Item    Value        Reference Range Interpretation Comments

 

             Sodium Lvl (test code = Sodium Lvl) 142          135-145           

        



[facility] CenterWowpxfoFIGZAWHTHU8598-22-80 05:42:00





             Test Item    Value        Reference Range Interpretation Comments

 

             Basophils (test code = 0.6          See_Comment                [Aut

omated message] The



             Basophils)                                          system which ge

nerated



                                                                 this result tra

nsmitted



                                                                 reference range

: <=1.0.



                                                                 The reference r

zhen was



                                                                 not used to int

erpret



                                                                 this result as



                                                                 normal/abnormal

.



[facility] CenterJuradqeUQRWEGSRGW6113-47-17 05:42:00





             Test Item    Value        Reference Range Interpretation Comments

 

             Segs-Bands # (test code = Segs-Bands #) 4.8          1.5-8.1       

            



[place] 228218-05-08 05:42:00





             Test Item    Value        Reference Range Interpretation Comments

 

             Monocytes # (test code 0.7          See_Comment                [Aut

omated message] The



             = Monocytes #)                                        system which 

generated



                                                                 this result tra

nsmitted



                                                                 reference range

: <=0.8.



                                                                 The reference r

zhen was



                                                                 not used to int

erpret



                                                                 this result as



                                                                 normal/abnormal

.



[facility] CenterJamdezvWPRSZVIXYP9699-18-46 05:42:00





             Test Item    Value        Reference Range Interpretation Comments

 

             Lymphocytes # (test code = Lymphocytes 1.9          1.0-5.5        

           



             #)                                                  



[facility] CenterWmvkjclGQPADUGRVX8686-07-70 05:42:00





             Test Item    Value        Reference Range Interpretation Comments

 

             Monocytes (test code = Monocytes) 9.4          2.0-12.0            

      



[facility] CenterDlhexhhBONUPXPXXN9915-85-98 05:42:00





             Test Item    Value        Reference Range Interpretation Comments

 

             Eosinophils # (test code 0.2          See_Comment                [A

utomated message] The



             = Eosinophils #)                                        system whic

h generated



                                                                 this result tra

nsmitted



                                                                 reference range

: <=0.5.



                                                                 The reference r

zhen was



                                                                 not used to int

erpret



                                                                 this result as



                                                                 normal/abnormal

.



[facility] CenterAiaevpjXFDZTIDPBU6451-46-66 05:42:00





             Test Item    Value        Reference Range Interpretation Comments

 

             Eosinophils (test code = 2.9          See_Comment                [A

utomated message] The



             Eosinophils)                                        system which ge

nerated



                                                                 this result tra

nsmitted



                                                                 reference range

: <=4.0.



                                                                 The reference r

zhen was



                                                                 not used to int

erpret



                                                                 this result as



                                                                 normal/abnormal

.



[facility] CenterOopnaopPDICUJTROQ1820-29-93 05:42:00





             Test Item    Value        Reference Range Interpretation Comments

 

             Segs (test code = Segs) 62.2         45.0-75.0                 



[facility] CenterPnpsmfpLHRSOOPYIY9406-26-96 05:42:00





             Test Item    Value        Reference Range Interpretation Comments

 

             Lymphocytes (test code = Lymphocytes) 24.9         20.0-40.0       

          



[facility] CenterTxudumkKPPTCCFYGL9701-54-89 05:42:00





             Test Item    Value        Reference Range Interpretation Comments

 

             MCH (test code = MCH) 27.5 pg      27.0-31.0                 



[facility] CenterJiqvmuhKIVDBABBRU9711-91-14 05:42:00





             Test Item    Value        Reference Range Interpretation Comments

 

             MCV (test code = MCV) 85.3         80.0-94.0                 



[facility] CenterUmsdsbzHBWTAYYYZR5498-72-78 05:42:00





             Test Item    Value        Reference Range Interpretation Comments

 

             Hct (test code = Hct) 43.9         42.0-54.0                 



[facility] CenterMkihbxfPIGJKGQZWB7032-60-13 05:42:00





             Test Item    Value        Reference Range Interpretation Comments

 

             Hgb (test code = Hgb) 14.2         14.0-18.0                 



[facility] CenterTeqoqspPAATQQZQKI4729-68-75 05:42:00





             Test Item    Value        Reference Range Interpretation Comments

 

             WBC (test code = WBC) 7.7          3.7-10.4                  



[facility] CenterSaibsnyHAPQEDOAFU0584-90-53 05:42:00





             Test Item    Value        Reference Range Interpretation Comments

 

             RBC (test code = RBC) 5.15         4.70-6.10                 



[facility] CenterTbmurvkAVURFAQWIE2843-42-21 05:42:00





             Test Item    Value        Reference Range Interpretation Comments

 

             MPV (test code = MPV) 8.4          7.4-10.4                  



[facility] CenterXhxuntgRQWVLVSYIC2240-50-20 05:42:00





             Test Item    Value        Reference Range Interpretation Comments

 

             MCHC (test code = MCHC) 32.3         32.0-36.0                 



[facility] CenterZoggxioSVGGYSMRQN4332-10-31 05:42:00





             Test Item    Value        Reference Range Interpretation Comments

 

             RDW (test code = RDW) 17.3         11.5-14.5                 



[facility] CenterKxjpsvlCIQJZYZJKH6038-75-27 05:42:00





             Test Item    Value        Reference Range Interpretation Comments

 

             Platelet (test code = Platelet) 317          133-450               

    



[facility]2018-05-08 05:42:00





             Test Item    Value        Reference Range Interpretation Comments

 

             B/C Ratio (test code = B/C Ratio) 17 1         6-25                

      



[facility]2018-05-08 05:42:00





             Test Item    Value        Reference Range Interpretation Comments

 

             Globulin (test code = Globulin) 4.3          2.7-4.2               

    



[facility]2018-05-08 05:42:00





             Test Item    Value        Reference Range Interpretation Comments

 

             A/G Ratio (test code = A/G Ratio) 0.7 1        0.7-1.6             

      



[facility]2018-05-08 05:42:00





             Test Item    Value        Reference Range Interpretation Comments

 

             AGAP (test code = AGAP) 14.4         10.0-20.0                 



[facility]2018-05-08 05:42:00





             Test Item    Value        Reference Range Interpretation Comments

 

             eGFR (test code = eGFR) 113                                    



[facility]2018-05-08 05:42:00





             Test Item    Value        Reference Range Interpretation Comments

 

             Alk Phos (test code = Alk Phos) 76                           

    



[facility]2018-05-08 05:42:00





             Test Item    Value        Reference Range Interpretation Comments

 

             ALT (test code = ALT) 35           See_Comment                [Auto

mated message] The



                                                                 system which ge

nerated this



                                                                 result transmit

huma



                                                                 reference range

: <=65. The



                                                                 reference range

 was not



                                                                 used to interpr

et this



                                                                 result as zayda

l/abnormal.



[facility]2018-05-08 05:42:00





             Test Item    Value        Reference Range Interpretation Comments

 

             Albumin Lvl (test code = Albumin Lvl) 2.8          3.5-5.0         

          



[facility]2018-05-08 05:42:00





             Test Item    Value        Reference Range Interpretation Comments

 

             Total Protein (test code = Total 7.1          6.4-8.4              

     



             Protein)                                            



[facility]2018-05-08 05:42:00





             Test Item    Value        Reference Range Interpretation Comments

 

             Calcium Lvl (test code = Calcium Lvl) 8.7          8.5-10.5        

          



[facility]2018-05-08 05:42:00





             Test Item    Value        Reference Range Interpretation Comments

 

             AST (test code = AST) 18           See_Comment                [Auto

mated message] The



                                                                 system which ge

nerated this



                                                                 result transmit

huma



                                                                 reference range

: <=37. The



                                                                 reference range

 was not



                                                                 used to interpr

et this



                                                                 result as zayda

l/abnormal.



[facility]2018-05-08 05:42:00





             Test Item    Value        Reference Range Interpretation Comments

 

             Bili Total (test code = Bili Total) 0.3          0.2-1.3           

        



[facility]2018-05-08 05:42:00





             Test Item    Value        Reference Range Interpretation Comments

 

             Potassium Lvl (test code = Potassium 4.4          3.5-5.1          

         



             Lvl)                                                



[facility]2018-05-08 05:42:00





             Test Item    Value        Reference Range Interpretation Comments

 

             Chloride Lvl (test code = Chloride Lvl) 109                  

            



[facility]2018-05-08 05:42:00





             Test Item    Value        Reference Range Interpretation Comments

 

             CO2 (test code = CO2) 23           24-32                     



[facility]2018-05-08 05:42:00





             Test Item    Value        Reference Range Interpretation Comments

 

             Glucose Lvl (test code = Glucose Lvl) 114          70-99           

          



[place] [id number] 05:42:00





             Test Item    Value        Reference Range Interpretation Comments

 

             Creatinine Lvl (test code = Creatinine 1.01         0.50-1.40      

           



             Lvl)                                                



[facility]2018-05-08 05:42:00





             Test Item    Value        Reference Range Interpretation Comments

 

             BUN (test code = BUN) 17           7-22                      



[facility]2018-05-08 05:42:00





             Test Item    Value        Reference Range Interpretation Comments

 

             Sodium Lvl (test code = Sodium Lvl) 142          135-145           

        



[facility]IdjsbocCQOEUTLAYM5465-70[facility]59 [facility]





             Test Item    Value        Reference Range Interpretation Comments

 

             Basophils (test code = 0.6          See_Comment                [Aut

omated message] The



             Basophils)                                          system which ge

nerated



                                                                 this result tra

nsmitted



                                                                 reference range

: <=1.0.



                                                                 The reference r

zhen was



                                                                 not used to int

erpret



                                                                 this result as



                                                                 normal/abnormal

.



[facility] CenterMcpmpvbZUHNMYCYJJ4603-15-83 05:42:00





             Test Item    Value        Reference Range Interpretation Comments

 

             Segs-Bands # (test code = Segs-Bands #) 4.8          1.5-8.1       

            



[facility] CenterLifjrtaJGLWLACMJF1740-40-70 05:42:00





             Test Item    Value        Reference Range Interpretation Comments

 

             Monocytes # (test code 0.7          See_Comment                [Aut

omated message] The



             = Monocytes #)                                        system which 

generated



                                                                 this result tra

nsmitted



                                                                 reference range

: <=0.8.



                                                                 The reference r

zhen was



                                                                 not used to int

erpret



                                                                 this result as



                                                                 normal/abnormal

.



[facility] CenterTelmwbrPPMBKVFBYF2010-61-34 05:42:00





             Test Item    Value        Reference Range Interpretation Comments

 

             Lymphocytes # (test code = Lymphocytes 1.9          1.0-5.5        

           



             #)                                                  



[facility] CenterXsiebrmYUBPDVQFQR3624-59-76 05:42:00





             Test Item    Value        Reference Range Interpretation Comments

 

             Monocytes (test code = Monocytes) 9.4          2.0-12.0            

      



[facility] CenterZlyckmiWZLPXADDFF2343-63-19 05:42:00





             Test Item    Value        Reference Range Interpretation Comments

 

             Eosinophils # (test code 0.2          See_Comment                [A

utomated message] The



             = Eosinophils #)                                        system whic

h generated



                                                                 this result tra

nsmitted



                                                                 reference range

: <=0.5.



                                                                 The reference r

zhen was



                                                                 not used to int

erpret



                                                                 this result as



                                                                 normal/abnormal

.



[facility] CenterB[id number] 05:42:00





             Test Item    Value        Reference Range Interpretation Comments

 

             Eosinophils (test code = 2.9          See_Comment                [A

utomated message] The



             Eosinophils)                                        system which ge

nerated



                                                                 this result tra

nsmitted



                                                                 reference range

: <=4.0.



                                                                 The reference r

zhen was



                                                                 not used to int

erpret



                                                                 this result as



                                                                 normal/abnormal

.



[facility] CenterByvanunXDIDNPPFTH5772-49-71 05:42:00





             Test Item    Value        Reference Range Interpretation Comments

 

             Segs (test code = Segs) 62.2         45.0-75.0                 



[facility] CenterGaklxddCPDTGYZAWS7755-86-81 05:42:00





             Test Item    Value        Reference Range Interpretation Comments

 

             Lymphocytes (test code = Lymphocytes) 24.9         20.0-40.0       

          



[facility] CenterTxiamczOFVHNPSICV9903-19-72 05:42:00





             Test Item    Value        Reference Range Interpretation Comments

 

             MCH (test code = MCH) 27.5 pg      27.0-31.0                 



[facility] CenterRjpbanoHNZIMLIWLD7220-15-61 05:42:00





             Test Item    Value        Reference Range Interpretation Comments

 

             MCV (test code = MCV) 85.3         80.0-94.0                 



[facility] CenterIoxcbzxUVYYQZYWZA5695-46-67 05:42:00





             Test Item    Value        Reference Range Interpretation Comments

 

             Hct (test code = Hct) 43.9         42.0-54.0                 



[facility] CenterVgjejtjWUCRAZCMOJ7465-83-61 05:42:00





             Test Item    Value        Reference Range Interpretation Comments

 

             Hgb (test code = Hgb) 14.2         14.0-18.0                 



[facility] CenterDkurxidXOZEYBPCOI3701-94-18 05:42:00





             Test Item    Value        Reference Range Interpretation Comments

 

             WBC (test code = WBC) 7.7          3.7-10.4                  



[facility] CenterBiqmxdrAWJOOENGCG7010-65-68 05:42:00





             Test Item    Value        Reference Range Interpretation Comments

 

             RBC (test code = RBC) 5.15         4.70-6.10                 



[facility] CenterBkyfrehRMMIFMLWYT6583-79-35 05:42:00





             Test Item    Value        Reference Range Interpretation Comments

 

             MPV (test code = MPV) 8.4          7.4-10.4                  



[facility] CenterRbzhzucDAKYAVNDSK8968-61-56 05:42:00





             Test Item    Value        Reference Range Interpretation Comments

 

             MCHC (test code = MCHC) 32.3         32.0-36.0                 



[facility] CenterJosqlokUMVLMKPPTR3226-07-49 05:42:00





             Test Item    Value        Reference Range Interpretation Comments

 

             RDW (test code = RDW) 17.3         11.5-14.5                 



[facility]btGZQKDICJTN4953[facility]87-48 [facility]:42:00





             Test Item    Value        Reference Range Interpretation Comments

 

             Platelet (test code = Platelet) 317          133-450               

    



[facility]2018-05-08 05:42:00





             Test Item    Value        Reference Range Interpretation Comments

 

             B/C Ratio (test code = B/C Ratio) 17 1         6-25                

      



[facility]2018-05-08 05:42:00





             Test Item    Value        Reference Range Interpretation Comments

 

             Globulin (test code = Globulin) 4.3          2.7-4.2               

    



[facility]2018-05-08 05:42:00





             Test Item    Value        Reference Range Interpretation Comments

 

             A/G Ratio (test code = A/G Ratio) 0.7 1        0.7-1.6             

      



[place] [id number] 05:42:00





             Test Item    Value        Reference Range Interpretation Comments

 

             AGAP (test code = AGAP) 14.4         10.0-20.0                 



[facility]2018-05-08 05:42:00





             Test Item    Value        Reference Range Interpretation Comments

 

             eGFR (test code = eGFR) 113                                    



[facility]2018-05-08 05:42:00





             Test Item    Value        Reference Range Interpretation Comments

 

             Alk Phos (test code = Alk Phos) 76                           

    



[facility]2018-05-08 05:42:00





             Test Item    Value        Reference Range Interpretation Comments

 

             ALT (test code = ALT) 35           See_Comment                [Auto

mated message] The



                                                                 system which ge

nerated this



                                                                 result transmit

huma



                                                                 reference range

: <=65. The



                                                                 reference range

 was not



                                                                 used to interpr

et this



                                                                 result as zayda

l/abnormal.



[facility]2018-05-08 05:42:00





             Test Item    Value        Reference Range Interpretation Comments

 

             Albumin Lvl (test code = Albumin Lvl) 2.8          3.5-5.0         

          



[facility]2018-05-08 05:42:00





             Test Item    Value        Reference Range Interpretation Comments

 

             Total Protein (test code = Total 7.1          6.4-8.4              

     



             Protein)                                            



[facility]2018-05-08 05:42:00





             Test Item    Value        Reference Range Interpretation Comments

 

             Calcium Lvl (test code = Calcium Lvl) 8.7          8.5-10.5        

          



[facility]2018-05-08 05:42:00





             Test Item    Value        Reference Range Interpretation Comments

 

             AST (test code = AST) 18           See_Comment                [Auto

mated message] The



                                                                 system which ge

nerated this



                                                                 result transmit

huma



                                                                 reference range

: <=37. The



                                                                 reference range

 was not



                                                                 used to interpr

et this



                                                                 result as zayda

l/abnormal.



[facility]2018-05-08 05:42:00





             Test Item    Value        Reference Range Interpretation Comments

 

             Bili Total (test code = Bili Total) 0.3          0.2-1.3           

        



[facility]2018-05-08 05:42:00





             Test Item    Value        Reference Range Interpretation Comments

 

             Potassium Lvl (test code = Potassium 4.4          3.5-5.1          

         



             Lvl)                                                



[facility]2018-05-08 05:42:00





             Test Item    Value        Reference Range Interpretation Comments

 

             Chloride Lvl (test code = Chloride Lvl) 109                  

            



[facility]2018-05-08 05:42:00





             Test Item    Value        Reference Range Interpretation Comments

 

             CO2 (test code = CO2) 23           24-32                     



[facility]2018-05-08 05:42:00





             Test Item    Value        Reference Range Interpretation Comments

 

             Glucose Lvl (test code = Glucose Lvl) 114          70-99           

          



[facility]2018-05-08 05:42:00





             Test Item    Value        Reference Range Interpretation Comments

 

             Creatinine Lvl (test code = Creatinine 1.01         0.50-1.40      

           



             Lvl)                                                



[facility]2018-05-08 05:42:00





             Test Item    Value        Reference Range Interpretation Comments

 

             BUN (test code = BUN) 17           7-22                      



[facility]2018-05-08 05:42:00





             Test Item    Value        Reference Range Interpretation Comments

 

             Sodium Lvl (test code = Sodium Lvl) 142          135-145           

        



[facility] CenterHxudotoNDGDPLAUXC3532-23-22 05:42:00





             Test Item    Value        Reference Range Interpretation Comments

 

             Basophils (test code = 0.6          See_Comment                [Aut

omated message] The



             Basophils)                                          system which ge

nerated



                                                                 this result tra

nsmitted



                                                                 reference range

: <=1.0.



                                                                 The reference r

zhen was



                                                                 not used to int

erpret



                                                                 this result as



                                                                 normal/abnormal

.



[facility] CenterIpdqghySAARCSHPLC3008-06-37 05:42:00





             Test Item    Value        Reference Range Interpretation Comments

 

             Segs-Bands # (test code = Segs-Bands #) 4.8          1.5-8.1       

            



[facility] CenterAmbzrphMEDPZTQCYQ8045-29-16 05:42:00





             Test Item    Value        Reference Range Interpretation Comments

 

             Monocytes # (test code 0.7          See_Comment                [Aut

omated message] The



             = Monocytes #)                                        system which 

generated



                                                                 this result tra

nsmitted



                                                                 reference range

: <=0.8.



                                                                 The reference r

zhen was



                                                                 not used to int

erpret



                                                                 this result as



                                                                 normal/abnormal

.



[facility] CenterVrlltsiBIVLOJGTXQ3157-24-84 05:42:00





             Test Item    Value        Reference Range Interpretation Comments

 

             Lymphocytes # (test code = Lymphocytes 1.9          1.0-5.5        

           



             #)                                                  



[facility] CenterFcohjlrALQKTLZNLT2109-55-75 05:42:00





             Test Item    Value        Reference Range Interpretation Comments

 

             Monocytes (test code = Monocytes) 9.4          2.0-12.0            

      



[facility] CenterAyybvumNMQOSWYUTE8957-80-74 05:42:00





             Test Item    Value        Reference Range Interpretation Comments

 

             Eosinophils # (test code 0.2          See_Comment                [A

utomated message] The



             = Eosinophils #)                                        system whic

h generated



                                                                 this result tra

nsmitted



                                                                 reference range

: <=0.5.



                                                                 The reference r

zhen was



                                                                 not used to int

erpret



                                                                 this result as



                                                                 normal/abnormal

.



[facility] CenterNwfhxhyTSHYHZYLAR8469-39-80 05:42:00





             Test Item    Value        Reference Range Interpretation Comments

 

             Eosinophils (test code = 2.9          See_Comment                [A

utomated message] The



             Eosinophils)                                        system which ge

nerated



                                                                 this result tra

nsmitted



                                                                 reference range

: <=4.0.



                                                                 The reference r

zhen was



                                                                 not used to int

erpret



                                                                 this result as



                                                                 normal/abnormal

.



[facility] CenterEopqqhrCWKQYHRFQI3814-54-92 05:42:00





             Test Item    Value        Reference Range Interpretation Comments

 

             Segs (test code = Segs) 62.2         45.0-75.0                 



[facility] CenterIcmfsmnUFIPBFWPGW2994-85-27 05:42:00





             Test Item    Value        Reference Range Interpretation Comments

 

             Lymphocytes (test code = Lymphocytes) 24.9         20.0-40.0       

          



[facility] CenterWsxhjbmWWLPDRYYWK9452-59-36 05:42:00





             Test Item    Value        Reference Range Interpretation Comments

 

             MCH (test code = MCH) 27.5 pg      27.0-31.0                 



[facility] CenterIhwjuxyMBFUZKZAMJ4456-43-58 05:42:00





             Test Item    Value        Reference Range Interpretation Comments

 

             MCV (test code = MCV) 85.3         80.0-94.0                 



[facility] CenterSknsnpoMVMQQVFOTN5667-15-88 05:42:00





             Test Item    Value        Reference Range Interpretation Comments

 

             Hct (test code = Hct) 43.9         42.0-54.0                 



[facility] CenterQmuayeyLELWAINWVH6926-39-65 05:42:00





             Test Item    Value        Reference Range Interpretation Comments

 

             Hgb (test code = Hgb) 14.2         14.0-18.0                 



[facility] CenterDirixasCKWVQBIIFA7033-88-54 05:42:00





             Test Item    Value        Reference Range Interpretation Comments

 

             WBC (test code = WBC) 7.7          3.7-10.4                  



[facility] CenterSbxxayfTAMWOCGLSU6502-52-01 05:42:00





             Test Item    Value        Reference Range Interpretation Comments

 

             RBC (test code = RBC) 5.15         4.70-6.10                 



[facility] CenterPpkfyieLNRBNQAXYJ5180-18-88 05:42:00





             Test Item    Value        Reference Range Interpretation Comments

 

             MPV (test code = MPV) 8.4          7.4-10.4                  



[facility] CenterPvkczfzAZSFOUZTHP0411-48-61 05:42:00





             Test Item    Value        Reference Range Interpretation Comments

 

             MCHC (test code = MCHC) 32.3         32.0-36.0                 



[facility] [id number] 05:42:00





             Test Item    Value        Reference Range Interpretation Comments

 

             RDW (test code = RDW) 17.3         11.5-14.5                 



[facility] CenterFztwbrzRBXRSJCUYF4252-17-44 05:42:00





             Test Item    Value        Reference Range Interpretation Comments

 

             Platelet (test code = Platelet) 317          133-450               

    



[facility]2018-05-08 05:42:00





             Test Item    Value        Reference Range Interpretation Comments

 

             B/C Ratio (test code = B/C Ratio) 17 1         6-25                

      



[facility]2018-05-08 05:42:00





             Test Item    Value        Reference Range Interpretation Comments

 

             Globulin (test code = Globulin) 4.3          2.7-4.2               

    



[facility]2018-05-08 05:42:00





             Test Item    Value        Reference Range Interpretation Comments

 

             A/G Ratio (test code = A/G Ratio) 0.7 1        0.7-1.6             

      



[facility]2018-05-08 05:42:00





             Test Item    Value        Reference Range Interpretation Comments

 

             AGAP (test code = AGAP) 14.4         10.0-20.0                 



[facility]2018-05-08 05:42:00





             Test Item    Value        Reference Range Interpretation Comments

 

             eGFR (test code = eGFR) 113                                    



[facility]2018-05-08 05:42:00





             Test Item    Value        Reference Range Interpretation Comments

 

             Alk Phos (test code = Alk Phos) 76                           

    



[facility]2018-05-08 05:42:00





             Test Item    Value        Reference Range Interpretation Comments

 

             ALT (test code = ALT) 35           See_Comment                [Auto

mated message] The



                                                                 system which ge

nerated this



                                                                 result transmit

huma



                                                                 reference range

: <=65. The



                                                                 reference range

 was not



                                                                 used to interpr

et this



                                                                 result as zayda

l/abnormal.



[facility]2018-05-08 05:42:00





             Test Item    Value        Reference Range Interpretation Comments

 

             Albumin Lvl (test code = Albumin Lvl) 2.8          3.5-5.0         

          



[facility]2018-05-08 05:42:00





             Test Item    Value        Reference Range Interpretation Comments

 

             Total Protein (test code = Total 7.1          6.4-8.4              

     



             Protein)                                            



[facility]2018-05-08 05:42:00





             Test Item    Value        Reference Range Interpretation Comments

 

             Calcium Lvl (test code = Calcium Lvl) 8.7          8.5-10.5        

          



[facility]2018-05-08 05:42:00





             Test Item    Value        Reference Range Interpretation Comments

 

             AST (test code = AST) 18           See_Comment                [Auto

mated message] The



                                                                 system which ge

nerated this



                                                                 result transmit

huma



                                                                 reference range

: <=37. The



                                                                 reference range

 was not



                                                                 used to interpr

et this



                                                                 result as zayda

l/abnormal.



[facility]2018-05-08 05:42:00





             Test Item    Value        Reference Range Interpretation Comments

 

             Bili Total (test code = Bili Total) 0.3          0.2-1.3           

        



[facility]2018-05-08 05:42:00





             Test Item    Value        Reference Range Interpretation Comments

 

             Potassium Lvl (test code = Potassium 4.4          3.5-5.1          

         



             Lvl)                                                



[facility]2018-05-08 05:42:00





             Test Item    Value        Reference Range Interpretation Comments

 

             Chloride Lvl (test code = Chloride Lvl) 109                  

            



[facility]2018-05-08 05:42:00





             Test Item    Value        Reference Range Interpretation Comments

 

             CO2 (test code = CO2) 23           24-32                     



[facility]2018-05-08 05:42:00





             Test Item    Value        Reference Range Interpretation Comments

 

             Glucose Lvl (test code = Glucose Lvl) 114          70-99           

          



[facility]2018-05-08 05:42:00





             Test Item    Value        Reference Range Interpretation Comments

 

             Creatinine Lvl (test code = Creatinine 1.01         0.50-1.40      

           



             Lvl)                                                



[facility]2018-05-08 05:42:00





             Test Item    Value        Reference Range Interpretation Comments

 

             BUN (test code = BUN) 17           7-22                      



[facility]2018-05-08 05:42:00





             Test Item    Value        Reference Range Interpretation Comments

 

             Sodium Lvl (test code = Sodium Lvl) 142          135-145           

        



[facility] CenterDogpdsaPSYZPQUICE8929-34-19 05:42:00





             Test Item    Value        Reference Range Interpretation Comments

 

             Basophils (test code = 0.6          See_Comment                [Aut

omated message] The



             Basophils)                                          system which ge

nerated



                                                                 this result tra

nsmitted



                                                                 reference range

: <=1.0.



                                                                 The reference r

zhen was



                                                                 not used to int

erpret



                                                                 this result as



                                                                 normal/abnormal

.



[facility] CenterIhoudctALZNTRPUJF0591-77-54 05:42:00





             Test Item    Value        Reference Range Interpretation Comments

 

             Segs-Bands # (test code = Segs-Bands #) 4.8          1.5-8.1       

            



[facility] Cent[id number] 05:42:00





             Test Item    Value        Reference Range Interpretation Comments

 

             Monocytes # (test code 0.7          See_Comment                [Aut

omated message] The



             = Monocytes #)                                        system which 

generated



                                                                 this result tra

nsmitted



                                                                 reference range

: <=0.8.



                                                                 The reference r

zhen was



                                                                 not used to int

erpret



                                                                 this result as



                                                                 normal/abnormal

.



[facility] CenterVsixpvdZKFYDXAFNH4442-66-47 05:42:00





             Test Item    Value        Reference Range Interpretation Comments

 

             Lymphocytes # (test code = Lymphocytes 1.9          1.0-5.5        

           



             #)                                                  



[facility] CenterDmoxicwIFTEPPNGPU6377-42-86 05:42:00





             Test Item    Value        Reference Range Interpretation Comments

 

             Monocytes (test code = Monocytes) 9.4          2.0-12.0            

      



[facility] CenterMqfnhqqUANEAVKQEC4583-00-74 05:42:00





             Test Item    Value        Reference Range Interpretation Comments

 

             Eosinophils # (test code 0.2          See_Comment                [A

utomated message] The



             = Eosinophils #)                                        system whic

h generated



                                                                 this result tra

nsmitted



                                                                 reference range

: <=0.5.



                                                                 The reference r

zhen was



                                                                 not used to int

erpret



                                                                 this result as



                                                                 normal/abnormal

.



[facility] CenterZnzzvlvKTNPWWKFAH1251-17-36 05:42:00





             Test Item    Value        Reference Range Interpretation Comments

 

             Eosinophils (test code = 2.9          See_Comment                [A

utomated message] The



             Eosinophils)                                        system which ge

nerated



                                                                 this result tra

nsmitted



                                                                 reference range

: <=4.0.



                                                                 The reference r

zhen was



                                                                 not used to int

erpret



                                                                 this result as



                                                                 normal/abnormal

.



[facility] CenterOjsmfbkTJZMXNPUMU7760-30-42 05:42:00





             Test Item    Value        Reference Range Interpretation Comments

 

             Segs (test code = Segs) 62.2         45.0-75.0                 



[facility] CenterZqldgafKPOYNLHALJ3422-98-23 05:42:00





             Test Item    Value        Reference Range Interpretation Comments

 

             Lymphocytes (test code = Lymphocytes) 24.9         20.0-40.0       

          



[facility] CenterDddskvvCMLPPMNRQQ9274-43-46 05:42:00





             Test Item    Value        Reference Range Interpretation Comments

 

             MCH (test code = MCH) 27.5 pg      27.0-31.0                 



[facility] CenterYjcgdmbFWBEJRBGCE3090-48-45 05:42:00





             Test Item    Value        Reference Range Interpretation Comments

 

             MCV (test code = MCV) 85.3         80.0-94.0                 



[facility] CenterCvupdhcFMMKQOVHZM6980-50-81 05:42:00





             Test Item    Value        Reference Range Interpretation Comments

 

             Hct (test code = Hct) 43.9         42.0-54.0                 



[facility] CenterGhryxdbZRAPGBGZZL7722-46-51 05:42:00





             Test Item    Value        Reference Range Interpretation Comments

 

             Hgb (test code = Hgb) 14.2         14.0-18.0                 



[facility] CenterNcpvbkeXGFGUFYRWP9895-60-77 05:42:00





             Test Item    Value        Reference Range Interpretation Comments

 

             WBC (test code = WBC) 7.7          3.7-10.4                  



[facility] CenterSaevkfmAIGGYDFCNS1798-57-23 05:42:00





             Test Item    Value        Reference Range Interpretation Comments

 

             RBC (test code = RBC) 5.15         4.70-6.10                 



[facility] CenterDptzkmjXZVAEUZJFX9062-03-49 05:42:00





             Test Item    Value        Reference Range Interpretation Comments

 

             MPV (test code = MPV) 8.4          7.4-10.4                  



[facility] CenterSfnbwnoZCAMEWDCHO4676-40-02 05:42:00





             Test Item    Value        Reference Range Interpretation Comments

 

             MCHC (test code = MCHC) 32.3         32.0-36.0                 



[facility] CenterApiyipwHIZJJORROE9929-05-12 05:42:00





             Test Item    Value        Reference Range Interpretation Comments

 

             RDW (test code = RDW) 17.3         11.5-14.5                 



[facility] CenterWbnsyhvXTHUZMQZJR3133-82-77 05:42:00





             Test Item    Value        Reference Range Interpretation Comments

 

             Platelet (test code = Platelet) 317          133-450               

    



[facility]2018-05-08 05:42:00





             Test Item    Value        Reference Range Interpretation Comments

 

             B/C Ratio (test code = B/C Ratio) 17 1         6-25                

      



[facility]2018-05-08 05:42:00





             Test Item    Value        Reference Range Interpretation Comments

 

             Globulin (test code = Globulin) 4.3          2.7-4.2               

    



[facility]2018-05-08 05:42:00





             Test Item    Value        Reference Range Interpretation Comments

 

             A/G Ratio (test code = A/G Ratio) 0.7 1        0.7-1.6             

      



[facility]2018-05-08 05:42:00





             Test Item    Value        Reference Range Interpretation Comments

 

             AGAP (test code = AGAP) 14.4         10.0-20.0                 



[facility]2018-05-08 05:42:00





             Test Item    Value        Reference Range Interpretation Comments

 

             eGFR (test code = eGFR) 113                                    



[facility]2018-05-08 05:42:00





             Test Item    Value        Reference Range Interpretation Comments

 

             Alk Phos (test code = Alk Phos) 76                           

    



[facility]2018-05-08 05:42:00





             Test Item    Value        Reference Range Interpretation Comments

 

             ALT (test code = ALT) 35           See_Comment                [Auto

mated message] The



                                                                 system which ge

nerated this



                                                                 result transmit

huma



                                                                 reference range

: <=65. The



                                                                 reference range

 was not



                                                                 used to interpr

et this



                                                                 result as zayda

l/abnormal.



[facility]2018-05-08 05:42:00





             Test Item    Value        Reference Range Interpretation Comments

 

             Albumin Lvl (test code = Albumin Lvl) 2.8          3.5-5.0         

          



[facility]2018-05-08 05:42:00





             Test Item    Value        Reference Range Interpretation Comments

 

             Total Protein (test code = Total 7.1          6.4-8.4              

     



             Protein)                                            



[facility]2018-05-08 05:42:00





             Test Item    Value        Reference Range Interpretation Comments

 

             Calcium Lvl (test code = Calcium Lvl) 8.7          8.5-10.5        

          



[facility]2018-05-08 05:42:00





             Test Item    Value        Reference Range Interpretation Comments

 

             AST (test code = AST) 18           See_Comment                [Auto

mated message] The



                                                                 system which ge

nerated this



                                                                 result transmit

huma



                                                                 reference range

: <=37. The



                                                                 reference range

 was not



                                                                 used to interpr

et this



                                                                 result as zayda

l/abnormal.



[facility]2018-05-08 05:42:00





             Test Item    Value        Reference Range Interpretation Comments

 

             Bili Total (test code = Bili Total) 0.3          0.2-1.3           

        



[facility]2018-05-08 05:42:00





             Test Item    Value        Reference Range Interpretation Comments

 

             Potassium Lvl (test code = Potassium 4.4          3.5-5.1          

         



             Lvl)                                                



[facility]2018-05-08 05:42:00





             Test Item    Value        Reference Range Interpretation Comments

 

             Chloride Lvl (test code = Chloride Lvl) 109                  

            



[facility]2018-05-08 05:42:00





             Test Item    Value        Reference Range Interpretation Comments

 

             CO2 (test code = CO2) 23           24-32                     



[facility]2018-05-08 05:42:00





             Test Item    Value        Reference Range Interpretation Comments

 

             Glucose Lvl (test code = Glucose Lvl) 114          70-99           

          



[facility]2018-05-08 05:42:00





             Test Item    Value        Reference Range Interpretation Comments

 

             Creatinine Lvl (test code = Creatinine 1.01         0.50-1.40      

           



             Lvl)                                                



[facility]2018-05-08 05:42:00





             Test Item    Value        Reference Range Interpretation Comments

 

             BUN (test code = BUN) 17           7-22                      



[facility]2018-05-08 05:42:00





             Test Item    Value        Reference Range Interpretation Comments

 

             Sodium Lvl (test code = Sodium Lvl) 142          135-145           

        



[facility] CenterIgwlrawEZLEWXOVSP6591-64-35 05:42:00





             Test Item    Value        Reference Range Interpretation Comments

 

             Basophils (test code = 0.6          See_Comment                [Aut

omated message] The



             Basophils)                                          system which ge

nerated



                                                                 this result tra

nsmitted



                                                                 reference range

: <=1.0.



                                                                 The reference r

zhen was



                                                                 not used to int

erpret



                                                                 this result as



                                                                 normal/abnormal

.



[facility] CenterVghwjniQNRDTKTDWJ6038-19-31 05:42:00





             Test Item    Value        Reference Range Interpretation Comments

 

             Segs-Bands # (test code = Segs-Bands #) 4.8          1.5-8.1       

            



[facility] CenterYmluatqCXRGNYJMPL0632-98-99 05:42:00





             Test Item    Value        Reference Range Interpretation Comments

 

             Monocytes # (test code 0.7          See_Comment                [Aut

omated message] The



             = Monocytes #)                                        system which 

generated



                                                                 this result tra

nsmitted



                                                                 reference range

: <=0.8.



                                                                 The reference r

zhen was



                                                                 not used to int

erpret



                                                                 this result as



                                                                 normal/abnormal

.



[facility] CenterHqpkuohQYDFQTQHDB1712-46-31 05:42:00





             Test Item    Value        Reference Range Interpretation Comments

 

             Lymphocytes # (test code = Lymphocytes 1.9          1.0-5.5        

           



             #)                                                  



[facility] CenterYdgdjpeFPVMHBJMTI5293-27-74 05:42:00





             Test Item    Value        Reference Range Interpretation Comments

 

             Monocytes (test code = Monocytes) 9.4          2.0-12.0            

      



[facility] CenterPfuqfklYIRDPBIVEB6952-49-35 05:42:00





             Test Item    Value        Reference Range Interpretation Comments

 

             Eosinophils # (test code 0.2          See_Comment                [A

utomated message] The



             = Eosinophils #)                                        system wh

h generated



                                                                 this result tra

nsmitted



                                                                 reference range

: <=0.5.



                                                                 The reference r

zhen was



                                                                 not used to int

erpret



                                                                 this result as



                                                                 normal/abnormal

.



[facility] CenterEuiyuqqSNKEVZIVOV0707-44-00 05:42:00





             Test Item    Value        Reference Range Interpretation Comments

 

             Eosinophils (test code = 2.9          See_Comment                [A

utomated message] The



             Eosinophils)                                        system which ge

nerated



                                                                 this result tra

nsmitted



                                                                 reference range

: <=4.0.



                                                                 The reference r

zhen was



                                                                 not used to int

erpret



                                                                 this result as



                                                                 normal/abnormal

.



[facility] CenterUpnxkzbREFQEPWQMR9754-72-14 05:42:00





             Test Item    Value        Reference Range Interpretation Comments

 

             Segs (test code = Segs) 62.2         45.0-75.0                 



[facility] CenterIjtyfuqZNQOMQUGZR7955-11-14 05:42:00





             Test Item    Value        Reference Range Interpretation Comments

 

             Lymphocytes (test code = Lymphocytes) 24.9         20.0-40.0       

          



[facility] CenterDobqkryOOVXTNRMMT9153-57-48 05:42:00





             Test Item    Value        Reference Range Interpretation Comments

 

             MCH (test code = MCH) 27.5 pg      27.0-31.0                 



[id number] 05:42:00





             Test Item    Value        Reference Range Interpretation Comments

 

             MCV (test code = MCV) 85.3         80.0-94.0                 



[facility] CenterNhkozfyUIPQPQJTCQ0911-56-43 05:42:00





             Test Item    Value        Reference Range Interpretation Comments

 

             Hct (test code = Hct) 43.9         42.0-54.0                 



[facility] CenterSjhxrygMLIYPDFKPP9914-77-34 05:42:00





             Test Item    Value        Reference Range Interpretation Comments

 

             Hgb (test code = Hgb) 14.2         14.0-18.0                 



[facility] CenterMxhmicjRSDJPPPCVO9262-21-06 05:42:00





             Test Item    Value        Reference Range Interpretation Comments

 

             WBC (test code = WBC) 7.7          3.7-10.4                  



[facility] CenterHhfciomHUNPOBVRGC7173-74-25 05:42:00





             Test Item    Value        Reference Range Interpretation Comments

 

             RBC (test code = RBC) 5.15         4.70-6.10                 



[facility] CenterTskqqycXSPXZEFXYZ7942-77-80 05:42:00





             Test Item    Value        Reference Range Interpretation Comments

 

             MPV (test code = MPV) 8.4          7.4-10.4                  



[facility] CenterTuyqjkiLTKIRKWDID8113-83-83 05:42:00





             Test Item    Value        Reference Range Interpretation Comments

 

             MCHC (test code = MCHC) 32.3         32.0-36.0                 



[facility] CenterTocmkpiCTDBZHXYKW4024-69-25 05:42:00





             Test Item    Value        Reference Range Interpretation Comments

 

             RDW (test code = RDW) 17.3         11.5-14.5                 



[facility] CenterMpairznHEGVUSXYDE9894-53-69 05:42:00





             Test Item    Value        Reference Range Interpretation Comments

 

             Platelet (test code = Platelet) 317          133-450               

    



[facility]2018-05-08 05:42:00





             Test Item    Value        Reference Range Interpretation Comments

 

             B/C Ratio (test code = B/C Ratio) 17 1         6-25                

      



[facility] [id number] 05:42:00





             Test Item    Value        Reference Range Interpretation Comments

 

             Globulin (test code = Globulin) 4.3          2.7-4.2               

    



[facility] [id number] 05:42:00





             Test Item    Value        Reference Range Interpretation Comments

 

             A/G Ratio (test code = A/G Ratio) 0.7 1        0.7-1.6             

      



[facility]2018-05-08 05:42:00





             Test Item    Value        Reference Range Interpretation Comments

 

             AGAP (test code = AGAP) 14.4         10.0-20.0                 



[facility]2018-05-08 05:42:00





             Test Item    Value        Reference Range Interpretation Comments

 

             eGFR (test code = eGFR) 113                                    



[facility]2018-05-08 05:42:00





             Test Item    Value        Reference Range Interpretation Comments

 

             Alk Phos (test code = Alk Phos) 76                           

    



[place] [id number] 05:42:00





             Test Item    Value        Reference Range Interpretation Comments

 

             ALT (test code = ALT) 35           See_Comment                [Auto

mated message] The



                                                                 system which ge

nerated this



                                                                 result transmit

huma



                                                                 reference range

: <=65. The



                                                                 reference range

 was not



                                                                 used to interpr

et this



                                                                 result as zayda

l/abnormal.



[facility]2018-05-08 05:42:00





             Test Item    Value        Reference Range Interpretation Comments

 

             Albumin Lvl (test code = Albumin Lvl) 2.8          3.5-5.0         

          



[facility]2018-05-08 05:42:00





             Test Item    Value        Reference Range Interpretation Comments

 

             Total Protein (test code = Total 7.1          6.4-8.4              

     



             Protein)                                            



[facility]2018-05-08 05:42:00





             Test Item    Value        Reference Range Interpretation Comments

 

             Calcium Lvl (test code = Calcium Lvl) 8.7          8.5-10.5        

          



[facility]2018-05-08 05:42:00





             Test Item    Value        Reference Range Interpretation Comments

 

             AST (test code = AST) 18           See_Comment                [Auto

mated message] The



                                                                 system which ge

nerated this



                                                                 result transmit

huma



                                                                 reference range

: <=37. The



                                                                 reference range

 was not



                                                                 used to interpr

et this



                                                                 result as zayda

l/abnormal.



[facility]2018-05-08 05:42:00





             Test Item    Value        Reference Range Interpretation Comments

 

             Bili Total (test code = Bili Total) 0.3          0.2-1.3           

        



[place] [id number] 05:42:00





             Test Item    Value        Reference Range Interpretation Comments

 

             Potassium Lvl (test code = Potassium 4.4          3.5-5.1          

         



             Lvl)                                                



[facility]2018-05-08 05:42:00





             Test Item    Value        Reference Range Interpretation Comments

 

             Chloride Lvl (test code = Chloride Lvl) 109                  

            



[facility]2018-05-08 05:42:00





             Test Item    Value        Reference Range Interpretation Comments

 

             CO2 (test code = CO2) 23           24-32                     



[place] [id number] 05:42:00





             Test Item    Value        Reference Range Interpretation Comments

 

             Glucose Lvl (test code = Glucose Lvl) 114          70-99           

          



[facility]2018-05-08 05:42:00





             Test Item    Value        Reference Range Interpretation Comments

 

             Creatinine Lvl (test code = Creatinine 1.01         0.50-1.40      

           



             Lvl)                                                



[facility]2018-05-08 05:42:00





             Test Item    Value        Reference Range Interpretation Comments

 

             BUN (test code = BUN) 17           7-22                      



[facility]2018-05-08 05:42:00





             Test Item    Value        Reference Range Interpretation Comments

 

             Sodium Lvl (test code = Sodium Lvl) 142          135-145           

        



[facility] Cent[id number] 05:42:00





             Test Item    Value        Reference Range Interpretation Comments

 

             Basophils (test code = 0.6          See_Comment                [Aut

omated message] The



             Basophils)                                          system which ge

nerated



                                                                 this result tra

nsmitted



                                                                 reference range

: <=1.0.



                                                                 The reference r

zhen was



                                                                 not used to int

erpret



                                                                 this result as



                                                                 normal/abnormal

.



[facility] CenterRroqxbbHNWIOHQALP9179-48-48 05:42:00





             Test Item    Value        Reference Range Interpretation Comments

 

             Segs-Bands # (test code = Segs-Bands #) 4.8          1.5-8.1       

            



[facility] CenterQemuxsjAOBWTAUVAJ0585-17-10 05:42:00





             Test Item    Value        Reference Range Interpretation Comments

 

             Monocytes # (test code 0.7          See_Comment                [Aut

omated message] The



             = Monocytes #)                                        system which 

generated



                                                                 this result tra

nsmitted



                                                                 reference range

: <=0.8.



                                                                 The reference r

zhen was



                                                                 not used to int

erpret



                                                                 this result as



                                                                 normal/abnormal

.



[facility] CenterSczcgjjZYTHLQKKUE5868-70-05 05:42:00





             Test Item    Value        Reference Range Interpretation Comments

 

             Lymphocytes # (test code = Lymphocytes 1.9          1.0-5.5        

           



             #)                                                  



[facility] CenterTnrfffbKDHGSBKOBQ9710-64-20 05:42:00





             Test Item    Value        Reference Range Interpretation Comments

 

             Monocytes (test code = Monocytes) 9.4          2.0-12.0            

      



[facility] CenterKjcfqnwVLOOWNIXCL2250-60-92 05:42:00





             Test Item    Value        Reference Range Interpretation Comments

 

             Eosinophils # (test code 0.2          See_Comment                [A

utomated message] The



             = Eosinophils #)                                        system whic

h generated



                                                                 this result tra

nsmitted



                                                                 reference range

: <=0.5.



                                                                 The reference r

zhen was



                                                                 not used to int

erpret



                                                                 this result as



                                                                 normal/abnormal

.



[facility] CenterAvlwoquDOXHCXUGDP3050-43-80 05:42:00





             Test Item    Value        Reference Range Interpretation Comments

 

             Eosinophils (test code = 2.9          See_Comment                [A

utomated message] The



             Eosinophils)                                        system which ge

nerated



                                                                 this result tra

nsmitted



                                                                 reference range

: <=4.0.



                                                                 The reference r

zhen was



                                                                 not used to int

erpret



                                                                 this result as



                                                                 normal/abnormal

.



[facility] CenterGyhxbohBPGMOAIUKO4416-95-71 05:42:00





             Test Item    Value        Reference Range Interpretation Comments

 

             Segs (test code = Segs) 62.2         45.0-75.0                 



[facility] CenterKfxjtgfTMIVXQMBUJ5605-43-93 05:42:00





             Test Item    Value        Reference Range Interpretation Comments

 

             Lymphocytes (test code = Lymphocytes) 24.9         20.0-40.0       

          



[facility] CenterJjxcjqzXAYRCETXEI1987-73-35 05:42:00





             Test Item    Value        Reference Range Interpretation Comments

 

             MCH (test code = MCH) 27.5 pg      27.0-31.0                 



[facility] CenterPlgnnghNISLLQXFHE6232-75-93 05:42:00





             Test Item    Value        Reference Range Interpretation Comments

 

             MCV (test code = MCV) 85.3         80.0-94.0                 



[facility] CenterNczfjaiEDIPFXVYWX8113-30-22 05:42:00





             Test Item    Value        Reference Range Interpretation Comments

 

             Hct (test code = Hct) 43.9         42.0-54.0                 



[facility] CenterPdlrjurEHMZZBZJDV1205-66-83 05:42:00





             Test Item    Value        Reference Range Interpretation Comments

 

             Hgb (test code = Hgb) 14.2         14.0-18.0                 



[facility] CenterAskrjqgKHOFQDHDQQ6721-98-66 05:42:00





             Test Item    Value        Reference Range Interpretation Comments

 

             WBC (test code = WBC) 7.7          3.7-10.4                  



[facility] CenterSkojkjdARNJJJTCRS5735-18-58 05:42:00





             Test Item    Value        Reference Range Interpretation Comments

 

             RBC (test code = RBC) 5.15         4.70-6.10                 



[facility] CenterTrfbhvsIUOQZUBXEZ7522-48-48 05:42:00





             Test Item    Value        Reference Range Interpretation Comments

 

             MPV (test code = MPV) 8.4          7.4-10.4                  



[facility] CenterMxdvkcjRUCBGWOVLJ6452-76-18 05:42:00





             Test Item    Value        Reference Range Interpretation Comments

 

             MCHC (test code = MCHC) 32.3         32.0-36.0                 



[facility] CenterNinqgzoACPDKQAAIJ1314-52-37 05:42:00





             Test Item    Value        Reference Range Interpretation Comments

 

             RDW (test code = RDW) 17.3         11.5-14.5                 



[facility] CenterYuylntzJMKYPTTOWW3680-15-97 05:42:00





             Test Item    Value        Reference Range Interpretation Comments

 

             Platelet (test code = Platelet) 317          133-450               

    



[facility]2018-05-08 05:42:00





             Test Item    Value        Reference Range Interpretation Comments

 

             B/C Ratio (test code = B/C Ratio) 17 1         6-25                

      



[facility]2018-05-08 05:42:00





             Test Item    Value        Reference Range Interpretation Comments

 

             Globulin (test code = Globulin) 4.3          2.7-4.2               

    



[facility]2018-05-08 05:42:00





             Test Item    Value        Reference Range Interpretation Comments

 

             A/G Ratio (test code = A/G Ratio) 0.7 1        0.7-1.6             

      



[facility]2018-05-08 05:42:00





             Test Item    Value        Reference Range Interpretation Comments

 

             AGAP (test code = AGAP) 14.4         10.0-20.0                 



[facility]2018-05-08 05:42:00





             Test Item    Value        Reference Range Interpretation Comments

 

             eGFR (test code = eGFR) 113                                    



[facility]2018-05-08 05:42:00





             Test Item    Value        Reference Range Interpretation Comments

 

             Alk Phos (test code = Alk Phos) 76                           

    



[facility]2018-05-08 05:42:00





             Test Item    Value        Reference Range Interpretation Comments

 

             ALT (test code = ALT) 35           See_Comment                [Auto

mated message] The



                                                                 system which ge

nerated this



                                                                 result transmit

huma



                                                                 reference range

: <=65. The



                                                                 reference range

 was not



                                                                 used to interpr

et this



                                                                 result as zayda

l/abnormal.



[facility]2018-05-08 05:42:00





             Test Item    Value        Reference Range Interpretation Comments

 

             Albumin Lvl (test code = Albumin Lvl) 2.8          3.5-5.0         

          



[facility]2018-05-08 05:42:00





             Test Item    Value        Reference Range Interpretation Comments

 

             Total Protein (test code = Total 7.1          6.4-8.4              

     



             Protein)                                            



[facility]2018-05-08 05:42:00





             Test Item    Value        Reference Range Interpretation Comments

 

             Calcium Lvl (test code = Calcium Lvl) 8.7          8.5-10.5        

          



[facility]2018-05-08 05:42:00





             Test Item    Value        Reference Range Interpretation Comments

 

             AST (test code = AST) 18           See_Comment                [Auto

mated message] The



                                                                 system which ge

nerated this



                                                                 result transmit

huma



                                                                 reference range

: <=37. The



                                                                 reference range

 was not



                                                                 used to interpr

et this



                                                                 result as zayda

l/abnormal.



[facility]2018-05-08 05:42:00





             Test Item    Value        Reference Range Interpretation Comments

 

             Bili Total (test code = Bili Total) 0.3          0.2-1.3           

        



[facility]2018-05-08 05:42:00





             Test Item    Value        Reference Range Interpretation Comments

 

             Potassium Lvl (test code = Potassium 4.4          3.5-5.1          

         



             Lvl)                                                



[facility]2018-05-08 05:42:00





             Test Item    Value        Reference Range Interpretation Comments

 

             Chloride Lvl (test code = Chloride Lvl) 109                  

            



[facility]2018-05-08 05:42:00





             Test Item    Value        Reference Range Interpretation Comments

 

             CO2 (test code = CO2) 23           24-32                     



[facility]2018-05-08 05:42:00





             Test Item    Value        Reference Range Interpretation Comments

 

             Glucose Lvl (test code = Glucose Lvl) 114          70-99           

          



[facility]2018-05-08 05:42:00





             Test Item    Value        Reference Range Interpretation Comments

 

             Creatinine Lvl (test code = Creatinine 1.01         0.50-1.40      

           



             Lvl)                                                



[facility]2018-05-08 05:42:00





             Test Item    Value        Reference Range Interpretation Comments

 

             BUN (test code = BUN) 17           7-22                      



[facility]2018-05-08 05:42:00





             Test Item    Value        Reference Range Interpretation Comments

 

             Sodium Lvl (test code = Sodium Lvl) 142          135-145           

        



[facility] CenterDzxhclhSGILBAIKPW5903-80-06 05:42:00





             Test Item    Value        Reference Range Interpretation Comments

 

             Basophils (test code = 0.6          See_Comment                [Aut

omated message] The



             Basophils)                                          system which ge

nerated



                                                                 this result tra

nsmitted



                                                                 reference range

: <=1.0.



                                                                 The reference r

zhen was



                                                                 not used to int

erpret



                                                                 this result as



                                                                 normal/abnormal

.



[facility] CenterXgwazulGHNEENGPRM3803-03-11 05:42:00





             Test Item    Value        Reference Range Interpretation Comments

 

             Segs-Bands # (test code = Segs-Bands #) 4.8          1.5-8.1       

            



[facility] CenterWzsavndWDRMCBYANN6920-38-72 05:42:00





             Test Item    Value        Reference Range Interpretation Comments

 

             Monocytes # (test code 0.7          See_Comment                [Aut

omated message] The



             = Monocytes #)                                        system which 

generated



                                                                 this result tra

nsmitted



                                                                 reference range

: <=0.8.



                                                                 The reference r

zhen was



                                                                 not used to int

erpret



                                                                 this result as



                                                                 normal/abnormal

.



[facility] CenterYnordlpDXOPDSBPDA5134-34-31 05:42:00





             Test Item    Value        Reference Range Interpretation Comments

 

             Lymphocytes # (test code = Lymphocytes 1.9          1.0-5.5        

           



             #)                                                  



[facility] CenterBjxwnztIYMMSPXSDP9566-73-67 05:42:00





             Test Item    Value        Reference Range Interpretation Comments

 

             Monocytes (test code = Monocytes) 9.4          2.0-12.0            

      



[facility] CenterThomecuTEKFJRXAWN7659-52-42 05:42:00





             Test Item    Value        Reference Range Interpretation Comments

 

             Eosinophils # (test code 0.2          See_Comment                [A

utomated message] The



             = Eosinophils #)                                        system whic

h generated



                                                                 this result tra

nsmitted



                                                                 reference range

: <=0.5.



                                                                 The reference r

zhen was



                                                                 not used to int

erpret



                                                                 this result as



                                                                 normal/abnormal

.



[facility] CenterSbmpfamNNUZGLNCYH0201-27-59 05:42:00





             Test Item    Value        Reference Range Interpretation Comments

 

             Eosinophils (test code = 2.9          See_Comment                [A

utomated message] The



             Eosinophils)                                        system which ge

nerated



                                                                 this result tra

nsmitted



                                                                 reference range

: <=4.0.



                                                                 The reference r

zhen was



                                                                 not used to int

erpret



                                                                 this result as



                                                                 normal/abnormal

.



[facility] CenterRbplekeIXGAURXPOB1643-15-92 05:42:00





             Test Item    Value        Reference Range Interpretation Comments

 

             Segs (test code = Segs) 62.2         45.0-75.0                 



[facility] CenterDcssuraLDKRXFXEDG5785-23-67 05:42:00





             Test Item    Value        Reference Range Interpretation Comments

 

             Lymphocytes (test code = Lymphocytes) 24.9         20.0-40.0       

          



[facility] CenterYdgrkfpEQCDLDUPZG0681-41-77 05:42:00





             Test Item    Value        Reference Range Interpretation Comments

 

             MCH (test code = MCH) 27.5 pg      27.0-31.0                 



[facility] CenterBvmgnudDVEGKTFEYR2871-86-38 05:42:00





             Test Item    Value        Reference Range Interpretation Comments

 

             MCV (test code = MCV) 85.3         80.0-94.0                 



[facility] CenterVlqaroyIBCNUVULIV5195-60-61 05:42:00





             Test Item    Value        Reference Range Interpretation Comments

 

             Hct (test code = Hct) 43.9         42.0-54.0                 



[facility] CenterYowmbwlPJHACEUGAC6650-25-96 05:42:00





             Test Item    Value        Reference Range Interpretation Comments

 

             Hgb (test code = Hgb) 14.2         14.0-18.0                 



[facility] CenterSvrnsfhRYPYHSMMSJ7084-50-67 05:42:00





             Test Item    Value        Reference Range Interpretation Comments

 

             WBC (test code = WBC) 7.7          3.7-10.4                  



[facility] CenterWaoyjpuUXSGHKGOSP3030-87-37 05:42:00





             Test Item    Value        Reference Range Interpretation Comments

 

             RBC (test code = RBC) 5.15         4.70-6.10                 



[facility] CenterCztbzfcECFCPOEGGK0203-32-51 05:42:00





             Test Item    Value        Reference Range Interpretation Comments

 

             MPV (test code = MPV) 8.4          7.4-10.4                  



[facility] CenterWkwpwkhUQGZDCAOMJ4862-80-60 05:42:00





             Test Item    Value        Reference Range Interpretation Comments

 

             MCHC (test code = MCHC) 32.3         32.0-36.0                 



[facility] CenterCnxndlcVPRRAUYMWR7158-26-27 05:42:00





             Test Item    Value        Reference Range Interpretation Comments

 

             RDW (test code = RDW) 17.3         11.5-14.5                 



[facility] CenterRxuwltoNLSYSBWBXN4199-66-29 05:42:00





             Test Item    Value        Reference Range Interpretation Comments

 

             Platelet (test code = Platelet) 317          492-450               

    



[facility] [id number] 05:42:00





             Test Item    Value        Reference Range Interpretation Comments

 

             B/C Ratio (test code = B/C Ratio) 17 1         6-25                

      



[facility] FIDHQ1284-34-84 05:42:00





             Test Item    Value        Reference Range Interpretation Comments

 

             Globulin (test code = Globulin) 4.3          2.7-4.2               

    



[facility]2018-05-08 05:42:00





             Test Item    Value        Reference Range Interpretation Comments

 

             A/G Ratio (test code = A/G Ratio) 0.7 1        0.7-1.6             

      



[facility]2018-05-08 05:42:00





             Test Item    Value        Reference Range Interpretation Comments

 

             AGAP (test code = AGAP) 14.4         10.0-20.0                 



[facility]2018-05-08 05:42:00





             Test Item    Value        Reference Range Interpretation Comments

 

             eGFR (test code = eGFR) 113                                    



[facility]2018-05-08 05:42:00





             Test Item    Value        Reference Range Interpretation Comments

 

             Alk Phos (test code = Alk Phos) 76                           

    



[facility]2018-05-08 05:42:00





             Test Item    Value        Reference Range Interpretation Comments

 

             ALT (test code = ALT) 35           See_Comment                [Auto

mated message] The



                                                                 system which ge

nerated this



                                                                 result transmit

huma



                                                                 reference range

: <=65. The



                                                                 reference range

 was not



                                                                 used to interpr

et this



                                                                 result as zayda

l/abnormal.



[facility]2018-05-08 05:42:00





             Test Item    Value        Reference Range Interpretation Comments

 

             Albumin Lvl (test code = Albumin Lvl) 2.8          3.5-5.0         

          



[facility]2018-05-08 05:42:00





             Test Item    Value        Reference Range Interpretation Comments

 

             Total Protein (test code = Total 7.1          6.4-8.4              

     



             Protein)                                            



[facility]2018-05-08 05:42:00





             Test Item    Value        Reference Range Interpretation Comments

 

             Calcium Lvl (test code = Calcium Lvl) 8.7          8.5-10.5        

          



[facility]2018-05-08 05:42:00





             Test Item    Value        Reference Range Interpretation Comments

 

             AST (test code = AST) 18           See_Comment                [Auto

mated message] The



                                                                 system which ge

nerated this



                                                                 result transmit

huma



                                                                 reference range

: <=37. The



                                                                 reference range

 was not



                                                                 used to interpr

et this



                                                                 result as zayda

l/abnormal.



[facility]2018-05-08 05:42:00





             Test Item    Value        Reference Range Interpretation Comments

 

             Bili Total (test code = Bili Total) 0.3          0.2-1.3           

        



[facility]2018-05-08 05:42:00





             Test Item    Value        Reference Range Interpretation Comments

 

             Potassium Lvl (test code = Potassium 4.4          3.5-5.1          

         



             Lvl)                                                



[facility]2018-05-08 05:42:00





             Test Item    Value        Reference Range Interpretation Comments

 

             Chloride Lvl (test code = Chloride Lvl) 109                  

            



[facility]2018-05-08 05:42:00





             Test Item    Value        Reference Range Interpretation Comments

 

             CO2 (test code = CO2) 23           24-32                     



[facility]2018-05-08 05:42:00





             Test Item    Value        Reference Range Interpretation Comments

 

             Glucose Lvl (test code = Glucose Lvl) 114          70-99           

          



[facility]2018-05-08 05:42:00





             Test Item    Value        Reference Range Interpretation Comments

 

             Creatinine Lvl (test code = Creatinine 1.01         0.50-1.40      

           



             Lvl)                                                



[facility]2018-05-08 05:42:00





             Test Item    Value        Reference Range Interpretation Comments

 

             BUN (test code = BUN) 17           7-22                      



[facility]2018-05-08 05:42:00





             Test Item    Value        Reference Range Interpretation Comments

 

             Sodium Lvl (test code = Sodium Lvl) 142          135-145           

        



[facility] CenterQhozfvsPASSWWYOIC9728-00-67 05:42:00





             Test Item    Value        Reference Range Interpretation Comments

 

             Basophils (test code = 0.6          See_Comment                [Aut

omated message] The



             Basophils)                                          system which ge

nerated



                                                                 this result tra

nsmitted



                                                                 reference range

: <=1.0.



                                                                 The reference r

zhen was



                                                                 not used to int

erpret



                                                                 this result as



                                                                 normal/abnormal

.



[facility] CenterMenvvwqIKKJLIPKEI1528-31-60 05:42:00





             Test Item    Value        Reference Range Interpretation Comments

 

             Segs-Bands # (test code = Segs-Bands #) 4.8          1.5-8.1       

            



[facility] CenterUmbyfmiGNFAXPPSBS9182-52-39 05:42:00





             Test Item    Value        Reference Range Interpretation Comments

 

             Monocytes # (test code 0.7          See_Comment                [Aut

omated message] The



             = Monocytes #)                                        system which 

generated



                                                                 this result tra

nsmitted



                                                                 reference range

: <=0.8.



                                                                 The reference r

zhen was



                                                                 not used to int

erpret



                                                                 this result as



                                                                 normal/abnormal

.



[facility] CenterRybzzvqFLFNWPRUKI0948-35-22 05:42:00





             Test Item    Value        Reference Range Interpretation Comments

 

             Lymphocytes # (test code = Lymphocytes 1.9          1.0-5.5        

           



             #)                                                  



[facility] CenterNisbvvvQTFAZWUEVA8346-68-25 05:42:00





             Test Item    Value        Reference Range Interpretation Comments

 

             Monocytes (test code = Monocytes) 9.4          2.0-12.0            

      



[facility] CenterXuobnerONYGSDOAGH3052-38-31 05:42:00





             Test Item    Value        Reference Range Interpretation Comments

 

             Eosinophils # (test code 0.2          See_Comment                [A

utomated message] The



             = Eosinophils #)                                        system whic

h generated



                                                                 this result tra

nsmitted



                                                                 reference range

: <=0.5.



                                                                 The reference r

zhen was



                                                                 not used to int

erpret



                                                                 this result as



                                                                 normal/abnormal

.



[facility] CenterAbfyczwKODZIBQZWW8619-17-70 05:42:00





             Test Item    Value        Reference Range Interpretation Comments

 

             Eosinophils (test code = 2.9          See_Comment                [A

utomated message] The



             Eosinophils)                                        system which ge

nerated



                                                                 this result tra

nsmitted



                                                                 reference range

: <=4.0.



                                                                 The reference r

zhen was



                                                                 not used to int

erpret



                                                                 this result as



                                                                 normal/abnormal

.



[facility] CenterTunusfmCCIBCYYDMJ1286-82-29 05:42:00





             Test Item    Value        Reference Range Interpretation Comments

 

             Segs (test code = Segs) 62.2         45.0-75.0                 



[facility] CenterXzbqiviFSCDVQPHCB7067-97-10 05:42:00





             Test Item    Value        Reference Range Interpretation Comments

 

             Lymphocytes (test code = Lymphocytes) 24.9         20.0-40.0       

          



[facility] CenterZotfvqpBBPJDLPASM8679-86-75 05:42:00





             Test Item    Value        Reference Range Interpretation Comments

 

             MCH (test code = MCH) 27.5 pg      27.0-31.0                 



[facility] CenterFrsmstvEJOPEPYJDV9374-99-69 05:42:00





             Test Item    Value        Reference Range Interpretation Comments

 

             MCV (test code = MCV) 85.3         80.0-94.0                 



[facility] CenterLmmdypdYKWCGMDQSN6026-65-75 05:42:00





             Test Item    Value        Reference Range Interpretation Comments

 

             Hct (test code = Hct) 43.9         42.0-54.0                 



[facility] CenterUwdhdicUXYBPFAIKU0281-35-76 05:42:00





             Test Item    Value        Reference Range Interpretation Comments

 

             Hgb (test code = Hgb) 14.2         14.0-18.0                 



[facility] CenterPvxhhcjMUMKXPUPJS8014-83-34 05:42:00





             Test Item    Value        Reference Range Interpretation Comments

 

             WBC (test code = WBC) 7.7          3.7-10.4                  



[facility] CenterPwzxdvmGUALNRICVU8361-93-53 05:42:00





             Test Item    Value        Reference Range Interpretation Comments

 

             RBC (test code = RBC) 5.15         4.70-6.10                 



[facility] CenterSkuckndVYOYANORMS5646-29-11 05:42:00





             Test Item    Value        Reference Range Interpretation Comments

 

             MPV (test code = MPV) 8.4          7.4-10.4                  



[facility] CenterQlpyfylFGHAXIRTYH2873-90-19 05:42:00





             Test Item    Value        Reference Range Interpretation Comments

 

             MCHC (test code = MCHC) 32.3         32.0-36.0                 



[facility] CenterOuhxxuaJFKZRJENVB2641-60-11 05:42:00





             Test Item    Value        Reference Range Interpretation Comments

 

             RDW (test code = RDW) 17.3         11.5-14.5                 



[facility] CenterGryiuhzZQUQLNNNLR4189-70-81 05:42:00





             Test Item    Value        Reference Range Interpretation Comments

 

             Platelet (test code = Platelet) 317          133-450               

    



[facility]2018-05-07 09:36:00





             Test Item    Value        Reference Range Interpretation Comments

 

             Globulin (test code = Globulin) 4.4          2.7-4.2               

    



[facility]2018-05-07 09:36:00





             Test Item    Value        Reference Range Interpretation Comments

 

             A/G Ratio (test code = A/G Ratio) 0.6 1        0.7-1.6             

      



[facility]2018-05-07 09:36:00





             Test Item    Value        Reference Range Interpretation Comments

 

             B/C Ratio (test code = B/C Ratio) 17 1         6-25                

      



[facility]2018-05-07 09:36:00





             Test Item    Value        Reference Range Interpretation Comments

 

             AGAP (test code = AGAP) 11.3         10.0-20.0                 



[facility]2018-05-07 09:36:00





             Test Item    Value        Reference Range Interpretation Comments

 

             eGFR (test code = eGFR) 134                                    



[facility]2018-05-07 09:36:00





             Test Item    Value        Reference Range Interpretation Comments

 

             Creatinine Lvl (test code = Creatinine 0.84         0.50-1.40      

           



             Lvl)                                                



[facility]2018-05-07 09:36:00





             Test Item    Value        Reference Range Interpretation Comments

 

             Sodium Lvl (test code = Sodium Lvl) 142          135-145           

        



[place] 985498-05-07 09:36:00





             Test Item    Value        Reference Range Interpretation Comments

 

             Glucose Lvl (test code = Glucose Lvl) 99           70-99           

          



[facility]2018-05-07 09:36:00





             Test Item    Value        Reference Range Interpretation Comments

 

             BUN (test code = BUN) 14           7-22                      



[place] 985498-05-07 09:36:00





             Test Item    Value        Reference Range Interpretation Comments

 

             Alk Phos (test code = Alk Phos) 79                           

    



[place] 985498-05-07 09:36:00





             Test Item    Value        Reference Range Interpretation Comments

 

             Bili Total (test code = Bili Total) 0.3          0.2-1.3           

        



[place] 985498-05-07 09:36:00





             Test Item    Value        Reference Range Interpretation Comments

 

             AST (test code = AST) 14           See_Comment                [Auto

mated message] The



                                                                 system which ge

nerated this



                                                                 result transmit

huma



                                                                 reference range

: <=37. The



                                                                 reference range

 was not



                                                                 used to interpr

et this



                                                                 result as zayda

l/abnormal.



[place] 985498-05-07 09:36:00





             Test Item    Value        Reference Range Interpretation Comments

 

             ALT (test code = ALT) 43           See_Comment                [Auto

mated message] The



                                                                 system which ge

nerated this



                                                                 result transmit

huma



                                                                 reference range

: <=65. The



                                                                 reference range

 was not



                                                                 used to interpr

et this



                                                                 result as zayda

l/abnormal.



[facility]2018-05-07 09:36:00





             Test Item    Value        Reference Range Interpretation Comments

 

             Total Protein (test code = Total 7.1          6.4-8.4              

     



             Protein)                                            



[facility]2018-05-07 09:36:00





             Test Item    Value        Reference Range Interpretation Comments

 

             Albumin Lvl (test code = Albumin Lvl) 2.7          3.5-5.0         

          



[place] 985498-05-07 09:36:00





             Test Item    Value        Reference Range Interpretation Comments

 

             Calcium Lvl (test code = Calcium Lvl) 9.2          8.5-10.5        

          



[facility]2018-05-07 09:36:00





             Test Item    Value        Reference Range Interpretation Comments

 

             CO2 (test code = CO2) 21           24-32                     



[place] 985498-05-07 09:36:00





             Test Item    Value        Reference Range Interpretation Comments

 

             Potassium Lvl (test code = Potassium 4.3          3.5-5.1          

         



             Lvl)                                                



[facility]2018-05-07 09:36:00





             Test Item    Value        Reference Range Interpretation Comments

 

             Chloride Lvl (test code = Chloride Lvl) 114                  

            



[facility] CenterLowebtyDAAWPZEMDB9419-70-93 09:36:00





             Test Item    Value        Reference Range Interpretation Comments

 

             MCHC (test code = MCHC) 32.5         32.0-36.0                 



[facility] CenterWdliyzlITWAIFYLSQ7743-17-63 09:36:00





             Test Item    Value        Reference Range Interpretation Comments

 

             RDW (test code = RDW) 17.5         11.5-14.5                 



[facility] CenterQojajopJHKQGEPLCZ7853-45-72 09:36:00





             Test Item    Value        Reference Range Interpretation Comments

 

             Platelet (test code = Platelet) 400          133-450               

    



[facility] CenterTwclnkuSDGUXYMFLQ5735-95-13 09:36:00





             Test Item    Value        Reference Range Interpretation Comments

 

             MPV (test code = MPV) 8.5          7.4-10.4                  



[facility] CenterSphsesfSZNCERQDJP0664-06-84 09:36:00





             Test Item    Value        Reference Range Interpretation Comments

 

             WBC (test code = WBC) 6.6          3.7-10.4                  



[facility] CenterFhhrkhtFQGBREXSMN7790-14-63 09:36:00





             Test Item    Value        Reference Range Interpretation Comments

 

             RBC (test code = RBC) 5.17         4.70-6.10                 



[facility] CenterWbtfrmyZXIQJZDESF2410-81-44 09:36:00





             Test Item    Value        Reference Range Interpretation Comments

 

             MCV (test code = MCV) 86.1         80.0-94.0                 



[facility] CenterYwigiqfWNCXQOUKAP3304-80-17 09:36:00





             Test Item    Value        Reference Range Interpretation Comments

 

             Hct (test code = Hct) 44.5         42.0-54.0                 



[facility] CenterOhfhvfkKGAUVWSQCC4519-92-42 09:36:00





             Test Item    Value        Reference Range Interpretation Comments

 

             MCH (test code = MCH) 28.0 pg      27.0-31.0                 



[facility] CenterDrsxkomMGNOFJBJFV4774-68-07 09:36:00





             Test Item    Value        Reference Range Interpretation Comments

 

             Hgb (test code = Hgb) 14.5         14.0-18.0                 



[facility] CenterAnelpvrUNWSRXHWUK5675-92-32 09:36:00





             Test Item    Value        Reference Range Interpretation Comments

 

             Lymphocytes # (test code = Lymphocytes 1.7          1.0-5.5        

           



             #)                                                  



[facility] CenterKbblbhzAWHXWXEWZQ2086-50-48 09:36:00





             Test Item    Value        Reference Range Interpretation Comments

 

             Monocytes # (test code 0.7          See_Comment                [Aut

omated message] The



             = Monocytes #)                                        system which 

generated



                                                                 this result tra

nsmitted



                                                                 reference range

: <=0.8.



                                                                 The reference r

zhen was



                                                                 not used to int

erpret



                                                                 this result as



                                                                 normal/abnormal

.



[facility] CenterFffmjskYQAOAHMHZZ5250-37-00 09:36:00





             Test Item    Value        Reference Range Interpretation Comments

 

             Eosinophils # (test code 0.2          See_Comment                [A

utomated message] The



             = Eosinophils #)                                        system whic

h generated



                                                                 this result tra

nsmitted



                                                                 reference range

: <=0.5.



                                                                 The reference r

zhen was



                                                                 not used to int

erpret



                                                                 this result as



                                                                 normal/abnormal

.



[facility] CenterJcuglkhKBKEZNZLVN4660-17-56 09:36:00





             Test Item    Value        Reference Range Interpretation Comments

 

             Lymphocytes (test code = Lymphocytes) 26.1         20.0-40.0       

          



[facility] CenterKgyuehdKAVWZTCLAE1913-36-40 09:36:00





             Test Item    Value        Reference Range Interpretation Comments

 

             Segs (test code = Segs) 59.3         45.0-75.0                 



[facility] CenterJicjvkuMGRVKZRWMQ3793-27-12 09:36:00





             Test Item    Value        Reference Range Interpretation Comments

 

             Basophils (test code = 0.8          See_Comment                [Aut

omated message] The



             Basophils)                                          system which ge

nerated



                                                                 this result tra

nsmitted



                                                                 reference range

: <=1.0.



                                                                 The reference r

zhen was



                                                                 not used to int

erpret



                                                                 this result as



                                                                 normal/abnormal

.



[facility] CenterGlvceyxWWTWJSFRNV2579-77-37 09:36:00





             Test Item    Value        Reference Range Interpretation Comments

 

             Monocytes (test code = Monocytes) 10.5         2.0-12.0            

      



[facility] CenterJqfitycEVEQMCZMHV2756-78-01 09:36:00





             Test Item    Value        Reference Range Interpretation Comments

 

             Eosinophils (test code = 3.3          See_Comment                [A

utomated message] The



             Eosinophils)                                        system which ge

nerated



                                                                 this result tra

nsmitted



                                                                 reference range

: <=4.0.



                                                                 The reference r

zhen was



                                                                 not used to int

erpret



                                                                 this result as



                                                                 normal/abnormal

.



[facility] CenterXfyqhfdSRQWWXDSIF9317-58-53 09:36:00





             Test Item    Value        Reference Range Interpretation Comments

 

             Segs-Bands # (test code = Segs-Bands #) 3.9          1.5-8.1       

            



[facility]2018-05-07 09:36:00





             Test Item    Value        Reference Range Interpretation Comments

 

             Globulin (test code = Globulin) 4.4          2.7-4.2               

    



[facility]2018-05-07 09:36:00





             Test Item    Value        Reference Range Interpretation Comments

 

             A/G Ratio (test code = A/G Ratio) 0.6 1        0.7-1.6             

      



[facility]2018-05-07 09:36:00





             Test Item    Value        Reference Range Interpretation Comments

 

             B/C Ratio (test code = B/C Ratio) 17 1         6-25                

      



[id number] 09:36:00





             Test Item    Value        Reference Range Interpretation Comments

 

             AGAP (test code = AGAP) 11.3         10.0-20.0                 



[facility]2018-05-07 09:36:00





             Test Item    Value        Reference Range Interpretation Comments

 

             eGFR (test code = eGFR) 134                                    



[facility]2018-05-07 09:36:00





             Test Item    Value        Reference Range Interpretation Comments

 

             Creatinine Lvl (test code = Creatinine 0.84         0.50-1.40      

           



             Lvl)                                                



[facility]2018-05-07 09:36:00





             Test Item    Value        Reference Range Interpretation Comments

 

             Sodium Lvl (test code = Sodium Lvl) 142          135-145           

        



[facility]2018-05-07 09:36:00





             Test Item    Value        Reference Range Interpretation Comments

 

             Glucose Lvl (test code = Glucose Lvl) 99           70-99           

          



[facility]2018-05-07 09:36:00





             Test Item    Value        Reference Range Interpretation Comments

 

             BUN (test code = BUN) 14           7-22                      



[facility]2018-05-07 09:36:00





             Test Item    Value        Reference Range Interpretation Comments

 

             Alk Phos (test code = Alk Phos) 79                           

    



[place] [id number] 09:36:00





             Test Item    Value        Reference Range Interpretation Comments

 

             Bili Total (test code = Bili Total) 0.3          0.2-1.3           

        



[place] 985498-05-07 09:36:00





             Test Item    Value        Reference Range Interpretation Comments

 

             AST (test code = AST) 14           See_Comment                [Auto

mated message] The



                                                                 system which ge

nerated this



                                                                 result transmit

huma



                                                                 reference range

: <=37. The



                                                                 reference range

 was not



                                                                 used to interpr

et this



                                                                 result as zayda

l/abnormal.



[facility]2018-05-07 09:36:00





             Test Item    Value        Reference Range Interpretation Comments

 

             ALT (test code = ALT) 43           See_Comment                [Auto

mated message] The



                                                                 system which ge

nerated this



                                                                 result transmit

huma



                                                                 reference range

: <=65. The



                                                                 reference range

 was not



                                                                 used to interpr

et this



                                                                 result as zayda

l/abnormal.



[facility]2018-05-07 09:36:00





             Test Item    Value        Reference Range Interpretation Comments

 

             Total Protein (test code = Total 7.1          6.4-8.4              

     



             Protein)                                            



[facility]2018-05-07 09:36:00





             Test Item    Value        Reference Range Interpretation Comments

 

             Albumin Lvl (test code = Albumin Lvl) 2.7          3.5-5.0         

          



[place] 985498-05-07 09:36:00





             Test Item    Value        Reference Range Interpretation Comments

 

             Calcium Lvl (test code = Calcium Lvl) 9.2          8.5-10.5        

          



[facility]2018-05-07 09:36:00





             Test Item    Value        Reference Range Interpretation Comments

 

             CO2 (test code = CO2) 21           24-32                     



[facility]2018-05-07 09:36:00





             Test Item    Value        Reference Range Interpretation Comments

 

             Potassium Lvl (test code = Potassium 4.3          3.5-5.1          

         



             Lvl)                                                



[facility]2018-05-07 09:36:00





             Test Item    Value        Reference Range Interpretation Comments

 

             Chloride Lvl (test code = Chloride Lvl) 114                  

            



[facility] CenterEpubvctAKIFGWMBBV3822-71-43 09:36:00





             Test Item    Value        Reference Range Interpretation Comments

 

             MCHC (test code = MCHC) 32.5         32.0-36.0                 



[facility] CenterDexabtgHMHYAEOHLZ8087-52-17 09:36:00





             Test Item    Value        Reference Range Interpretation Comments

 

             RDW (test code = RDW) 17.5         11.5-14.5                 



[facility] CenterZgahwpkWOABHRBHVX2008-30-83 09:36:00





             Test Item    Value        Reference Range Interpretation Comments

 

             Platelet (test code = Platelet) 400          133-450               

    



[facility] CenterYxkkqheVNHKEXPWVM6714-23-71 09:36:00





             Test Item    Value        Reference Range Interpretation Comments

 

             MPV (test code = MPV) 8.5          7.4-10.4                  



[facility] CenterZskerepKYOWHQLPWC0287-42-78 09:36:00





             Test Item    Value        Reference Range Interpretation Comments

 

             WBC (test code = WBC) 6.6          3.7-10.4                  



[facility] CenterHjomivaCRJVVJDIIN3088-61-74 09:36:00





             Test Item    Value        Reference Range Interpretation Comments

 

             RBC (test code = RBC) 5.17         4.70-6.10                 



[facility] CenterQnamluoBYQCWWFSCB6458-05-04 09:36:00





             Test Item    Value        Reference Range Interpretation Comments

 

             MCV (test code = MCV) 86.1         80.0-94.0                 



[facility] CenterNtxolypUMQRVVDKEQ2233-04-05 09:36:00





             Test Item    Value        Reference Range Interpretation Comments

 

             Hct (test code = Hct) 44.5         42.0-54.0                 



[facility] CenterLifdxglQYTFIXISTC7577-73-59 09:36:00





             Test Item    Value        Reference Range Interpretation Comments

 

             MCH (test code = MCH) 28.0 pg      27.0-31.0                 



[facility] CenterZhnohxvLLXFMCRGZM0335-47-54 09:36:00





             Test Item    Value        Reference Range Interpretation Comments

 

             Hgb (test code = Hgb) 14.5         14.0-18.0                 



[facility] CenterIokzrupFILKSWFPYS3745-55-46 09:36:00





             Test Item    Value        Reference Range Interpretation Comments

 

             Lymphocytes # (test code = Lymphocytes 1.7          1.0-5.5        

           



             #)                                                  



[facility] CenterLvsknelNDFUHFNMMJ1677-00-75 09:36:00





             Test Item    Value        Reference Range Interpretation Comments

 

             Monocytes # (test code 0.7          See_Comment                [Aut

omated message] The



             = Monocytes #)                                        system which 

generated



                                                                 this result tra

nsmitted



                                                                 reference range

: <=0.8.



                                                                 The reference r

zhen was



                                                                 not used to int

erpret



                                                                 this result as



                                                                 normal/abnormal

.



[facility] CenterVinlkpxPSDHQDWPUN2304-52-07 09:36:00





             Test Item    Value        Reference Range Interpretation Comments

 

             Eosinophils # (test code 0.2          See_Comment                [A

utomated message] The



             = Eosinophils #)                                        system whic

h generated



                                                                 this result tra

nsmitted



                                                                 reference range

: <=0.5.



                                                                 The reference r

zhen was



                                                                 not used to int

erpret



                                                                 this result as



                                                                 normal/abnormal

.



[facility] CenterDquuayfSPBAHHOYWJ9550-10-55 09:36:00





             Test Item    Value        Reference Range Interpretation Comments

 

             Lymphocytes (test code = Lymphocytes) 26.1         20.0-40.0       

          



[facility] CenterAaotvzcJRUVDBTTYR7583-90-22 09:36:00





             Test Item    Value        Reference Range Interpretation Comments

 

             Segs (test code = Segs) 59.3         45.0-75.0                 



[facility] CenterBvzmyprMYYLCOZUFX3161-53-31 09:36:00





             Test Item    Value        Reference Range Interpretation Comments

 

             Basophils (test code = 0.8          See_Comment                [Aut

omated message] The



             Basophils)                                          system which ge

nerated



                                                                 this result tra

nsmitted



                                                                 reference range

: <=1.0.



                                                                 The reference r

zhen was



                                                                 not used to int

erpret



                                                                 this result as



                                                                 normal/abnormal

.



[facility] CenterVzymdzjVJJRCAWMPX5844-48-24 09:36:00





             Test Item    Value        Reference Range Interpretation Comments

 

             Monocytes (test code = Monocytes) 10.5         2.0-12.0            

      



[facility] CenterHvceyoqAQYOZXJRQZ4476-31-06 09:36:00





             Test Item    Value        Reference Range Interpretation Comments

 

             Eosinophils (test code = 3.3          See_Comment                [A

utomated message] The



             Eosinophils)                                        system which ge

nerated



                                                                 this result tra

nsmitted



                                                                 reference range

: <=4.0.



                                                                 The reference r

zhen was



                                                                 not used to int

erpret



                                                                 this result as



                                                                 normal/abnormal

.



[facility] CenterMnffyqbQMNVSUWOBY5862-57-31 09:36:00





             Test Item    Value        Reference Range Interpretation Comments

 

             Segs-Bands # (test code = Segs-Bands #) 3.9          1.5-8.1       

            



[place] 985498-05-07 09:36:00





             Test Item    Value        Reference Range Interpretation Comments

 

             Globulin (test code = Globulin) 4.4          2.7-4.2               

    



[facility]2018-05-07 09:36:00





             Test Item    Value        Reference Range Interpretation Comments

 

             A/G Ratio (test code = A/G Ratio) 0.6 1        0.7-1.6             

      



[facility]2018-05-07 09:36:00





             Test Item    Value        Reference Range Interpretation Comments

 

             B/C Ratio (test code = B/C Ratio) 17 1         6-25                

      



[place] [address]85498-05-07 09:36:00





             Test Item    Value        Reference Range Interpretation Comments

 

             AGAP (test code = AGAP) 11.3         10.0-20.0                 



[facility]2018-05-07 09:36:00





             Test Item    Value        Reference Range Interpretation Comments

 

             eGFR (test code = eGFR) 134                                    



[facility]2018-05-07 09:36:00





             Test Item    Value        Reference Range Interpretation Comments

 

             Creatinine Lvl (test code = Creatinine 0.84         0.50-1.40      

           



             Lvl)                                                



[facility]2018-05-07 09:36:00





             Test Item    Value        Reference Range Interpretation Comments

 

             Sodium Lvl (test code = Sodium Lvl) 142          135-145           

        



[facility]2018-05-07 09:36:00





             Test Item    Value        Reference Range Interpretation Comments

 

             Glucose Lvl (test code = Glucose Lvl) 99           70-99           

          



[facility]2018-05-07 09:36:00





             Test Item    Value        Reference Range Interpretation Comments

 

             BUN (test code = BUN) 14           7-22                      



[place] [address]-05-07 09:36:00





             Test Item    Value        Reference Range Interpretation Comments

 

             Alk Phos (test code = Alk Phos) 79                           

    



[facility]2018-05-07 09:36:00





             Test Item    Value        Reference Range Interpretation Comments

 

             Bili Total (test code = Bili Total) 0.3          0.2-1.3           

        



[place] [address]85498-05-07 09:36:00





             Test Item    Value        Reference Range Interpretation Comments

 

             AST (test code = AST) 14           See_Comment                [Auto

mated message] The



                                                                 system which ge

nerated this



                                                                 result transmit

huma



                                                                 reference range

: <=37. The



                                                                 reference range

 was not



                                                                 used to interpr

et this



                                                                 result as zayda

l/abnormal.



[facility]2018-05-07 09:36:00





             Test Item    Value        Reference Range Interpretation Comments

 

             ALT (test code = ALT) 43           See_Comment                [Auto

mated message] The



                                                                 system which ge

nerated this



                                                                 result transmit

huma



                                                                 reference range

: <=65. The



                                                                 reference range

 was not



                                                                 used to interpr

et this



                                                                 result as zayda

l/abnormal.



[facility]2018-05-07 09:36:00





             Test Item    Value        Reference Range Interpretation Comments

 

             Total Protein (test code = Total 7.1          6.4-8.4              

     



             Protein)                                            



[place] 985498-05-07 09:36:00





             Test Item    Value        Reference Range Interpretation Comments

 

             Albumin Lvl (test code = Albumin Lvl) 2.7          3.5-5.0         

          



[place] 985498-05-07 09:36:00





             Test Item    Value        Reference Range Interpretation Comments

 

             Calcium Lvl (test code = Calcium Lvl) 9.2          8.5-10.5        

          



[place] 985498-05-07 09:36:00





             Test Item    Value        Reference Range Interpretation Comments

 

             CO2 (test code = CO2) 21           24-32                     



[facility]2018-05-07 09:36:00





             Test Item    Value        Reference Range Interpretation Comments

 

             Potassium Lvl (test code = Potassium 4.3          3.5-5.1          

         



             Lvl)                                                



[facility]2018-05-07 09:36:00





             Test Item    Value        Reference Range Interpretation Comments

 

             Chloride Lvl (test code = Chloride Lvl) 114                  

            



[facility] CenterQkknnscRYNYBMWBIS9176-42-69 09:36:00





             Test Item    Value        Reference Range Interpretation Comments

 

             MCHC (test code = MCHC) 32.5         32.0-36.0                 



[facility] CenterNzsgiimFVCAYKAWBV1363-36-33 09:36:00





             Test Item    Value        Reference Range Interpretation Comments

 

             RDW (test code = RDW) 17.5         11.5-14.5                 



[facility] CenterAtzsohrHTZUXBGRGG4826-55-57 09:36:00





             Test Item    Value        Reference Range Interpretation Comments

 

             Platelet (test code = Platelet) 400          133-450               

    



[facility] CenterAlirordBOXGOZGEJO8384-43-84 09:36:00





             Test Item    Value        Reference Range Interpretation Comments

 

             MPV (test code = MPV) 8.5          7.4-10.4                  



[facility] CenterImpfyxbYUZWXAJFRZ9936-25-61 09:36:00





             Test Item    Value        Reference Range Interpretation Comments

 

             WBC (test code = WBC) 6.6          3.7-10.4                  



[facility] CenterDbruduhTAABIVABKJ2869-73-49 09:36:00





             Test Item    Value        Reference Range Interpretation Comments

 

             RBC (test code = RBC) 5.17         4.70-6.10                 



[facility] CenterAaqikmrCLACWKLNBD0820-61-90 09:36:00





             Test Item    Value        Reference Range Interpretation Comments

 

             MCV (test code = MCV) 86.1         80.0-94.0                 



[facility] CenterUvzjnvoUNELPFWGWT0916-06-06 09:36:00





             Test Item    Value        Reference Range Interpretation Comments

 

             Hct (test code = Hct) 44.5         42.0-54.0                 



[facility] CenterEtaoqieDVMXLWTCVM0707-83-22 09:36:00





             Test Item    Value        Reference Range Interpretation Comments

 

             MCH (test code = MCH) 28.0 pg      27.0-31.0                 



[facility] CenterZzewiriLLVOYGUIXN8613-52-01 09:36:00





             Test Item    Value        Reference Range Interpretation Comments

 

             Hgb (test code = Hgb) 14.5         14.0-18.0                 



[facility] CenterJsdouucJFNTJEFKGJ6849-35-88 09:36:00





             Test Item    Value        Reference Range Interpretation Comments

 

             Lymphocytes # (test code = Lymphocytes 1.7          1.0-5.5        

           



             #)                                                  



[facility] CenterItlhaqwJNKCXUSFBX1227-10-79 09:36:00





             Test Item    Value        Reference Range Interpretation Comments

 

             Monocytes # (test code 0.7          See_Comment                [Aut

omated message] The



             = Monocytes #)                                        system which 

generated



                                                                 this result tra

nsmitted



                                                                 reference range

: <=0.8.



                                                                 The reference r

zhen was



                                                                 not used to int

erpret



                                                                 this result as



                                                                 normal/abnormal

.



[facility] CenterVakhbgrFCRKDOJOCD7734-46-10 09:36:00





             Test Item    Value        Reference Range Interpretation Comments

 

             Eosinophils # (test code 0.2          See_Comment                [A

utomated message] The



             = Eosinophils #)                                        system whic

h generated



                                                                 this result tra

nsmitted



                                                                 reference range

: <=0.5.



                                                                 The reference r

zhen was



                                                                 not used to int

erpret



                                                                 this result as



                                                                 normal/abnormal

.



[facility] CenterKzrxxrwZARTIIQQIV5299-99-45 09:36:00





             Test Item    Value        Reference Range Interpretation Comments

 

             Lymphocytes (test code = Lymphocytes) 26.1         20.0-40.0       

          



[facility] CenterKcyvbuqWSMPXZTQUQ5190-03-77 09:36:00





             Test Item    Value        Reference Range Interpretation Comments

 

             Segs (test code = Segs) 59.3         45.0-75.0                 



[facility] CenterLtlrfgwAFIKDUKGTT5561-33-97 09:36:00





             Test Item    Value        Reference Range Interpretation Comments

 

             Basophils (test code = 0.8          See_Comment                [Aut

omated message] The



             Basophils)                                          system which ge

nerated



                                                                 this result tra

nsmitted



                                                                 reference range

: <=1.0.



                                                                 The reference r

zhen was



                                                                 not used to int

erpret



                                                                 this result as



                                                                 normal/abnormal

.



[facility] CenterRaqnpsyLPANXKVVMR5673-98-31 09:36:00





             Test Item    Value        Reference Range Interpretation Comments

 

             Monocytes (test code = Monocytes) 10.5         2.0-12.0            

      



[facility] CenterAxhawmqYEFELEWQNT0465-61-19 09:36:00





             Test Item    Value        Reference Range Interpretation Comments

 

             Eosinophils (test code = 3.3          See_Comment                [A

utomated message] The



             Eosinophils)                                        system which ge

nerated



                                                                 this result tra

nsmitted



                                                                 reference range

: <=4.0.



                                                                 The reference r

zhen was



                                                                 not used to int

erpret



                                                                 this result as



                                                                 normal/abnormal

.



[facility] CenterIydkdarSUPJKQFNZM9760-15-78 09:36:00





             Test Item    Value        Reference Range Interpretation Comments

 

             Segs-Bands # (test code = Segs-Bands #) 3.9          1.5-8.1       

            



[facility]2018-05-07 09:36:00





             Test Item    Value        Reference Range Interpretation Comments

 

             Globulin (test code = Globulin) 4.4          2.7-4.2               

    



[facility]2018-05-07 09:36:00





             Test Item    Value        Reference Range Interpretation Comments

 

             A/G Ratio (test code = A/G Ratio) 0.6 1        0.7-1.6             

      



[id number] 09:36:00





             Test Item    Value        Reference Range Interpretation Comments

 

             B/C Ratio (test code = B/C Ratio) 17 1         6-25                

      



[place] [id number] 09:36:00





             Test Item    Value        Reference Range Interpretation Comments

 

             AGAP (test code = AGAP) 11.3         10.0-20.0                 



[facility]2018-05-07 09:36:00





             Test Item    Value        Reference Range Interpretation Comments

 

             eGFR (test code = eGFR) 134                                    



[facility]2018-05-07 09:36:00





             Test Item    Value        Reference Range Interpretation Comments

 

             Creatinine Lvl (test code = Creatinine 0.84         0.50-1.40      

           



             Lvl)                                                



[facility]2018-05-07 09:36:00





             Test Item    Value        Reference Range Interpretation Comments

 

             Sodium Lvl (test code = Sodium Lvl) 142          135-145           

        



[facility]2018-05-07 09:36:00





             Test Item    Value        Reference Range Interpretation Comments

 

             Glucose Lvl (test code = Glucose Lvl) 99           70-99           

          



[facility]2018-05-07 09:36:00





             Test Item    Value        Reference Range Interpretation Comments

 

             BUN (test code = BUN) 14           7-22                      



[facility]2018-05-07 09:36:00





             Test Item    Value        Reference Range Interpretation Comments

 

             Alk Phos (test code = Alk Phos) 79                           

    



[facility]2018-05-07 09:36:00





             Test Item    Value        Reference Range Interpretation Comments

 

             Bili Total (test code = Bili Total) 0.3          0.2-1.3           

        



[facility]2018-05-07 09:36:00





             Test Item    Value        Reference Range Interpretation Comments

 

             AST (test code = AST) 14           See_Comment                [Auto

mated message] The



                                                                 system which ge

nerated this



                                                                 result transmit

huma



                                                                 reference range

: <=37. The



                                                                 reference range

 was not



                                                                 used to interpr

et this



                                                                 result as zayda

l/abnormal.



[facility]2018-05-07 09:36:00





             Test Item    Value        Reference Range Interpretation Comments

 

             ALT (test code = ALT) 43           See_Comment                [Auto

mated message] The



                                                                 system which ge

nerated this



                                                                 result transmit

huma



                                                                 reference range

: <=65. The



                                                                 reference range

 was not



                                                                 used to interpr

et this



                                                                 result as zayda

l/abnormal.



[facility]2018-05-07 09:36:00





             Test Item    Value        Reference Range Interpretation Comments

 

             Total Protein (test code = Total 7.1          6.4-8.4              

     



             Protein)                                            



[place] [id number] 09:36:00





             Test Item    Value        Reference Range Interpretation Comments

 

             Albumin Lvl (test code = Albumin Lvl) 2.7          3.5-5.0         

          



[facility]2018-05-07 09:36:00





             Test Item    Value        Reference Range Interpretation Comments

 

             Calcium Lvl (test code = Calcium Lvl) 9.2          8.5-10.5        

          



[facility]2018-05-07 09:36:00





             Test Item    Value        Reference Range Interpretation Comments

 

             CO2 (test code = CO2) 21           24-32                     



[facility]2018-05-07 09:36:00





             Test Item    Value        Reference Range Interpretation Comments

 

             Potassium Lvl (test code = Potassium 4.3          3.5-5.1          

         



             Lvl)                                                



[facility]2018-05-07 09:36:00





             Test Item    Value        Reference Range Interpretation Comments

 

             Chloride Lvl (test code = Chloride Lvl) 114                  

            



[facility] CenterPrggumkPPVQJIFKBU8381-04-48 09:36:00





             Test Item    Value        Reference Range Interpretation Comments

 

             MCHC (test code = MCHC) 32.5         32.0-36.0                 



[facility] CenterFaryxzvZHUTJDUIDP2273-74-38 09:36:00





             Test Item    Value        Reference Range Interpretation Comments

 

             RDW (test code = RDW) 17.5         11.5-14.5                 



[facility] CenterEvpoqhdGIRWUULFIC2915-15-47 09:36:00





             Test Item    Value        Reference Range Interpretation Comments

 

             Platelet (test code = Platelet) 400          133-450               

    



[facility] CenterVoqlxmzPNYCVJNQSP0930-33-82 09:36:00





             Test Item    Value        Reference Range Interpretation Comments

 

             MPV (test code = MPV) 8.5          7.4-10.4                  



[facility] CenterLypckqzQSGCVJOFVY4725-37-67 09:36:00





             Test Item    Value        Reference Range Interpretation Comments

 

             WBC (test code = WBC) 6.6          3.7-10.4                  



[facility] CenterTnlmtqzSTQSGELLQF3009-44-20 09:36:00





             Test Item    Value        Reference Range Interpretation Comments

 

             RBC (test code = RBC) 5.17         4.70-6.10                 



[facility] CenterSqwitwoVSVJMCZNJL9024-77-63 09:36:00





             Test Item    Value        Reference Range Interpretation Comments

 

             MCV (test code = MCV) 86.1         80.0-94.0                 



[facility] CenterIffwyenXMIUVZDZTV4622-82-37 09:36:00





             Test Item    Value        Reference Range Interpretation Comments

 

             Hct (test code = Hct) 44.5         42.0-54.0                 



[facility] CenterPorvwmlIRGWKLJWTF7727-15-11 09:36:00





             Test Item    Value        Reference Range Interpretation Comments

 

             MCH (test code = MCH) 28.0 pg      27.0-31.0                 



[facility] CenterBcnjkiiCZTWJGOKWH7147-54-98 09:36:00





             Test Item    Value        Reference Range Interpretation Comments

 

             Hgb (test code = Hgb) 14.5         14.0-18.0                 



[facility] CenterXhllgenMTQVWGJOCS8671-48-98 09:36:00





             Test Item    Value        Reference Range Interpretation Comments

 

             Lymphocytes # (test code = Lymphocytes 1.7          1.0-5.5        

           



             #)                                                  



[facility] CenterYclrvwuUHAXFJNIAX5186-96-40 09:36:00





             Test Item    Value        Reference Range Interpretation Comments

 

             Monocytes # (test code 0.7          See_Comment                [Aut

omated message] The



             = Monocytes #)                                        system which 

generated



                                                                 this result tra

nsmitted



                                                                 reference range

: <=0.8.



                                                                 The reference r

zhen was



                                                                 not used to int

erpret



                                                                 this result as



                                                                 normal/abnormal

.



[facility] CenterAxjlusaLDOMNACEWF0525-80-14 09:36:00





             Test Item    Value        Reference Range Interpretation Comments

 

             Eosinophils # (test code 0.2          See_Comment                [A

utomated message] The



             = Eosinophils #)                                        system whic

h generated



                                                                 this result tra

nsmitted



                                                                 reference range

: <=0.5.



                                                                 The reference r

zhen was



                                                                 not used to int

erpret



                                                                 this result as



                                                                 normal/abnormal

.



[facility] CenterUnvmmocFHENQVZQRV7904-14-56 09:36:00





             Test Item    Value        Reference Range Interpretation Comments

 

             Lymphocytes (test code = Lymphocytes) 26.1         20.0-40.0       

          



[facility] CenterLsutookWIHNFYVUXX2239-12-48 09:36:00





             Test Item    Value        Reference Range Interpretation Comments

 

             Segs (test code = Segs) 59.3         45.0-75.0                 



[facility] CenterUiwaxgfLTMJYSRCYU7630-22-04 09:36:00





             Test Item    Value        Reference Range Interpretation Comments

 

             Basophils (test code = 0.8          See_Comment                [Aut

omated message] The



             Basophils)                                          system which ge

nerated



                                                                 this result tra

nsmitted



                                                                 reference range

: <=1.0.



                                                                 The reference r

zhen was



                                                                 not used to int

erpret



                                                                 this result as



                                                                 normal/abnormal

.



[facility] CenterHfieikmFNCSUQGGWM6963-82-86 09:36:00





             Test Item    Value        Reference Range Interpretation Comments

 

             Monocytes (test code = Monocytes) 10.5         2.0-12.0            

      



[facility] CenterHiylaowMDGRYDLIFB7725-20-03 09:36:00





             Test Item    Value        Reference Range Interpretation Comments

 

             Eosinophils (test code = 3.3          See_Comment                [A

utomated message] The



             Eosinophils)                                        system which ge

nerated



                                                                 this result tra

nsmitted



                                                                 reference range

: <=4.0.



                                                                 The reference r

zhen was



                                                                 not used to int

erpret



                                                                 this result as



                                                                 normal/abnormal

.



[facility] CenterHapkalsYCZHTUXNZV6839-20-54 09:36:00





             Test Item    Value        Reference Range Interpretation Comments

 

             Segs-Bands # (test code = Segs-Bands #) 3.9          1.5-8.1       

            



[facility]2018-05-07 09:36:00





             Test Item    Value        Reference Range Interpretation Comments

 

             Globulin (test code = Globulin) 4.4          2.7-4.2               

    



[facility]2018-05-07 09:36:00





             Test Item    Value        Reference Range Interpretation Comments

 

             A/G Ratio (test code = A/G Ratio) 0.6 1        0.7-1.6             

      



[facility]2018-05-07 09:36:00





             Test Item    Value        Reference Range Interpretation Comments

 

             B/C Ratio (test code = B/C Ratio) 17 1         6-25                

      



[facility]2018-05-07 09:36:00





             Test Item    Value        Reference Range Interpretation Comments

 

             AGAP (test code = AGAP) 11.3         10.0-20.0                 



[facility]2018-05-07 09:36:00





             Test Item    Value        Reference Range Interpretation Comments

 

             eGFR (test code = eGFR) 134                                    



[facility]2018-05-07 09:36:00





             Test Item    Value        Reference Range Interpretation Comments

 

             Creatinine Lvl (test code = Creatinine 0.84         0.50-1.40      

           



             Lvl)                                                



[place] 985498-05-07 09:36:00





             Test Item    Value        Reference Range Interpretation Comments

 

             Sodium Lvl (test code = Sodium Lvl) 142          135-145           

        



[place] 985498-05-07 09:36:00





             Test Item    Value        Reference Range Interpretation Comments

 

             Glucose Lvl (test code = Glucose Lvl) 99           70-99           

          



[facility]2018-05-07 09:36:00





             Test Item    Value        Reference Range Interpretation Comments

 

             BUN (test code = BUN) 14           7-22                      



[facility]2018-05-07 09:36:00





             Test Item    Value        Reference Range Interpretation Comments

 

             Alk Phos (test code = Alk Phos) 79                           

    



[place] 985498-05-07 09:36:00





             Test Item    Value        Reference Range Interpretation Comments

 

             Bili Total (test code = Bili Total) 0.3          0.2-1.3           

        



[facility]2018-05-07 09:36:00





             Test Item    Value        Reference Range Interpretation Comments

 

             AST (test code = AST) 14           See_Comment                [Auto

mated message] The



                                                                 system which ge

nerated this



                                                                 result transmit

huma



                                                                 reference range

: <=37. The



                                                                 reference range

 was not



                                                                 used to interpr

et this



                                                                 result as zayda

l/abnormal.



[facility]2018-05-07 09:36:00





             Test Item    Value        Reference Range Interpretation Comments

 

             ALT (test code = ALT) 43           See_Comment                [Auto

mated message] The



                                                                 system which ge

nerated this



                                                                 result transmit

huma



                                                                 reference range

: <=65. The



                                                                 reference range

 was not



                                                                 used to interpr

et this



                                                                 result as zayda

l/abnormal.



[facility]2018-05-07 09:36:00





             Test Item    Value        Reference Range Interpretation Comments

 

             Total Protein (test code = Total 7.1          6.4-8.4              

     



             Protein)                                            



[place] 985498-05-07 09:36:00





             Test Item    Value        Reference Range Interpretation Comments

 

             Albumin Lvl (test code = Albumin Lvl) 2.7          3.5-5.0         

          



[place] [id number] 09:36:00





             Test Item    Value        Reference Range Interpretation Comments

 

             Calcium Lvl (test code = Calcium Lvl) 9.2          8.5-10.5        

          



[facility]2018-05-07 09:36:00





             Test Item    Value        Reference Range Interpretation Comments

 

             CO2 (test code = CO2) 21           24-32                     



[facility]2018-05-07 09:36:00





             Test Item    Value        Reference Range Interpretation Comments

 

             Potassium Lvl (test code = Potassium 4.3          3.5-5.1          

         



             Lvl)                                                



[facility]2018-05-07 09:36:00





             Test Item    Value        Reference Range Interpretation Comments

 

             Chloride Lvl (test code = Chloride Lvl) 114                  

            



[facility]DxahuemKVZTGYUZUV8306[facility]96-81 [facility]:00





             Test Item    Value        Reference Range Interpretation Comments

 

             MCHC (test code = MCHC) 32.5         32.0-36.0                 



[facility] CenterXvyiyssJQWSOPEFQW4996-20-51 09:36:00





             Test Item    Value        Reference Range Interpretation Comments

 

             RDW (test code = RDW) 17.5         11.5-14.5                 



[facility] CenterWylnescOCVWGOFNAO5471-20-69 09:36:00





             Test Item    Value        Reference Range Interpretation Comments

 

             Platelet (test code = Platelet) 400          133-450               

    



[facility] CenterXuqprfbMFSLHVFNRK4337-49-80 09:36:00





             Test Item    Value        Reference Range Interpretation Comments

 

             MPV (test code = MPV) 8.5          7.4-10.4                  



[facility] CenterWvrbvgqMBLGHAMOJS7937-72-67 09:36:00





             Test Item    Value        Reference Range Interpretation Comments

 

             WBC (test code = WBC) 6.6          3.7-10.4                  



[facility] CenterIpvuyisNHWHKZLQJR8920-20-13 09:36:00





             Test Item    Value        Reference Range Interpretation Comments

 

             RBC (test code = RBC) 5.17         4.70-6.10                 



[facility] CenterKysvqpsBMJGWGNEKM8538-12-18 09:36:00





             Test Item    Value        Reference Range Interpretation Comments

 

             MCV (test code = MCV) 86.1         80.0-94.0                 



[facility] CenterNcomvjtQZMFORQCAQ7549-81-90 09:36:00





             Test Item    Value        Reference Range Interpretation Comments

 

             Hct (test code = Hct) 44.5         42.0-54.0                 



[facility] CenterAmxyhawYYMVLYEZFF9586-33-06 09:36:00





             Test Item    Value        Reference Range Interpretation Comments

 

             MCH (test code = MCH) 28.0 pg      27.0-31.0                 



[facility] CenterXdhhketOQATYQICTE8174-83-12 09:36:00





             Test Item    Value        Reference Range Interpretation Comments

 

             Hgb (test code = Hgb) 14.5         14.0-18.0                 



[facility] CenterLbtobnnZLBKDOXLKY0004-90-98 09:36:00





             Test Item    Value        Reference Range Interpretation Comments

 

             Lymphocytes # (test code = Lymphocytes 1.7          1.0-5.5        

           



             #)                                                  



[facility] CenterRjnrzraQVZQHHQKSO4570-12-97 09:36:00





             Test Item    Value        Reference Range Interpretation Comments

 

             Monocytes # (test code 0.7          See_Comment                [Aut

omated message] The



             = Monocytes #)                                        system which 

generated



                                                                 this result tra

nsmitted



                                                                 reference range

: <=0.8.



                                                                 The reference r

zhen was



                                                                 not used to int

erpret



                                                                 this result as



                                                                 normal/abnormal

.



[facility] CenterThpslhvWCPGRBQAKX3671-65-57 09:36:00





             Test Item    Value        Reference Range Interpretation Comments

 

             Eosinophils # (test code 0.2          See_Comment                [A

utomated message] The



             = Eosinophils #)                                        system whic

h generated



                                                                 this result tra

nsmitted



                                                                 reference range

: <=0.5.



                                                                 The reference r

zhen was



                                                                 not used to int

erpret



                                                                 this result as



                                                                 normal/abnormal

.



[facility] CenterDcbekfgEFHPUXPEOR0142-93-58 09:36:00





             Test Item    Value        Reference Range Interpretation Comments

 

             Lymphocytes (test code = Lymphocytes) 26.1         20.0-40.0       

          



[facility] CenterWyjimpoPGZVXDUUUE4224-30-16 09:36:00





             Test Item    Value        Reference Range Interpretation Comments

 

             Segs (test code = Segs) 59.3         45.0-75.0                 



[facility] CenterPvzjligKUCTBJVQMK1190-42-69 09:36:00





             Test Item    Value        Reference Range Interpretation Comments

 

             Basophils (test code = 0.8          See_Comment                [Aut

omated message] The



             Basophils)                                          system which ge

nerated



                                                                 this result tra

nsmitted



                                                                 reference range

: <=1.0.



                                                                 The reference r

zhen was



                                                                 not used to int

erpret



                                                                 this result as



                                                                 normal/abnormal

.



[facility] CenterIslkhvqMWTIJIBVAO3911-53-80 09:36:00





             Test Item    Value        Reference Range Interpretation Comments

 

             Monocytes (test code = Monocytes) 10.5         2.0-12.0            

      



[facility] CenterVhrwhahYSTFUCKKVF3087-83-81 09:36:00





             Test Item    Value        Reference Range Interpretation Comments

 

             Eosinophils (test code = 3.3          See_Comment                [A

utomated message] The



             Eosinophils)                                        system which ge

nerated



                                                                 this result tra

nsmitted



                                                                 reference range

: <=4.0.



                                                                 The reference r

zhen was



                                                                 not used to int

erpret



                                                                 this result as



                                                                 normal/abnormal

.



[facility] CenterVelgqkqQMNQKMGFWO0881-18-88 09:36:00





             Test Item    Value        Reference Range Interpretation Comments

 

             Segs-Bands # (test code = Segs-Bands #) 3.9          1.5-8.1       

            



[facility]2018-05-07 09:36:00





             Test Item    Value        Reference Range Interpretation Comments

 

             Globulin (test code = Globulin) 4.4          2.7-4.2               

    



[facility]2018-05-07 09:36:00





             Test Item    Value        Reference Range Interpretation Comments

 

             A/G Ratio (test code = A/G Ratio) 0.6 1        0.7-1.6             

      



[place] 985498-05-07 09:36:00





             Test Item    Value        Reference Range Interpretation Comments

 

             B/C Ratio (test code = B/C Ratio) 17 1         6-25                

      



[facility]2018-05-07 09:36:00





             Test Item    Value        Reference Range Interpretation Comments

 

             AGAP (test code = AGAP) 11.3         10.0-20.0                 



[facility]2018-05-07 09:36:00





             Test Item    Value        Reference Range Interpretation Comments

 

             eGFR (test code = eGFR) 134                                    



[facility]2018-05-07 09:36:00





             Test Item    Value        Reference Range Interpretation Comments

 

             Creatinine Lvl (test code = Creatinine 0.84         0.50-1.40      

           



             Lvl)                                                



[facility]2018-05-07 09:36:00





             Test Item    Value        Reference Range Interpretation Comments

 

             Sodium Lvl (test code = Sodium Lvl) 142          135-145           

        



[place] 985498-05-07 09:36:00





             Test Item    Value        Reference Range Interpretation Comments

 

             Glucose Lvl (test code = Glucose Lvl) 99           70-99           

          



[facility]2018-05-07 09:36:00





             Test Item    Value        Reference Range Interpretation Comments

 

             BUN (test code = BUN) 14           7-22                      



[place] 985498-05-07 09:36:00





             Test Item    Value        Reference Range Interpretation Comments

 

             Alk Phos (test code = Alk Phos) 79                           

    



[facility]2018-05-07 09:36:00





             Test Item    Value        Reference Range Interpretation Comments

 

             Bili Total (test code = Bili Total) 0.3          0.2-1.3           

        



[facility]2018-05-07 09:36:00





             Test Item    Value        Reference Range Interpretation Comments

 

             AST (test code = AST) 14           See_Comment                [Auto

mated message] The



                                                                 system which ge

nerated this



                                                                 result transmit

huma



                                                                 reference range

: <=37. The



                                                                 reference range

 was not



                                                                 used to interpr

et this



                                                                 result as zayda

l/abnormal.



[place] 985498-05-07 09:36:00





             Test Item    Value        Reference Range Interpretation Comments

 

             ALT (test code = ALT) 43           See_Comment                [Auto

mated message] The



                                                                 system which ge

nerated this



                                                                 result transmit

huma



                                                                 reference range

: <=65. The



                                                                 reference range

 was not



                                                                 used to interpr

et this



                                                                 result as zayda

l/abnormal.



[facility]2018-05-07 09:36:00





             Test Item    Value        Reference Range Interpretation Comments

 

             Total Protein (test code = Total 7.1          6.4-8.4              

     



             Protein)                                            



[facility]2018-05-07 09:36:00





             Test Item    Value        Reference Range Interpretation Comments

 

             Albumin Lvl (test code = Albumin Lvl) 2.7          3.5-5.0         

          



[place] 985498-05-07 09:36:00





             Test Item    Value        Reference Range Interpretation Comments

 

             Calcium Lvl (test code = Calcium Lvl) 9.2          8.5-10.5        

          



[place] 985498-05-07 09:36:00





             Test Item    Value        Reference Range Interpretation Comments

 

             CO2 (test code = CO2) 21           24-32                     



[facility]2018-05-07 09:36:00





             Test Item    Value        Reference Range Interpretation Comments

 

             Potassium Lvl (test code = Potassium 4.3          3.5-5.1          

         



             Lvl)                                                



[facility]2018-05-07 09:36:00





             Test Item    Value        Reference Range Interpretation Comments

 

             Chloride Lvl (test code = Chloride Lvl) 114                  

            



[facility] CenterUitcnmuQTGQQXQVKU8240-40-98 09:36:00





             Test Item    Value        Reference Range Interpretation Comments

 

             MCHC (test code = MCHC) 32.5         32.0-36.0                 



[place] 228218-05-07 09:36:00





             Test Item    Value        Reference Range Interpretation Comments

 

             RDW (test code = RDW) 17.5         11.5-14.5                 



[facility] CenterYcpfippXKOYZNACXX0889-91-90 09:36:00





             Test Item    Value        Reference Range Interpretation Comments

 

             Platelet (test code = Platelet) 400          133-450               

    



[facility] CenterNgniqfcIKTVRTMETT7086-73-32 09:36:00





             Test Item    Value        Reference Range Interpretation Comments

 

             MPV (test code = MPV) 8.5          7.4-10.4                  



[facility] CenterDixbxfrBMOOMPSXWP5594-61-03 09:36:00





             Test Item    Value        Reference Range Interpretation Comments

 

             WBC (test code = WBC) 6.6          3.7-10.4                  



[facility] CenterUjdxjhoAQPSPMYEKP0116-35-13 09:36:00





             Test Item    Value        Reference Range Interpretation Comments

 

             RBC (test code = RBC) 5.17         4.70-6.10                 



[facility] CenterSydvwsmAEMBVTDVNM2651-93-20 09:36:00





             Test Item    Value        Reference Range Interpretation Comments

 

             MCV (test code = MCV) 86.1         80.0-94.0                 



[facility] CenterCjxbhsmMBCWNXDMRA8690-10-74 09:36:00





             Test Item    Value        Reference Range Interpretation Comments

 

             Hct (test code = Hct) 44.5         42.0-54.0                 



[facility] CenterDrlqhklWAKBRDKEHH7885-18-77 09:36:00





             Test Item    Value        Reference Range Interpretation Comments

 

             MCH (test code = MCH) 28.0 pg      27.0-31.0                 



[facility] CenterSfyvyzwEPKOCPWIOY3680-94-34 09:36:00





             Test Item    Value        Reference Range Interpretation Comments

 

             Hgb (test code = Hgb) 14.5         14.0-18.0                 



[facility] CenterZicvefaWJSQWGSJQK1384-03-11 09:36:00





             Test Item    Value        Reference Range Interpretation Comments

 

             Lymphocytes # (test code = Lymphocytes 1.7          1.0-5.5        

           



             #)                                                  



[facility] CenterGousrldQIISZOBTWX5280-84-53 09:36:00





             Test Item    Value        Reference Range Interpretation Comments

 

             Monocytes # (test code 0.7          See_Comment                [Aut

omated message] The



             = Monocytes #)                                        system which 

generated



                                                                 this result tra

nsmitted



                                                                 reference range

: <=0.8.



                                                                 The reference r

zhen was



                                                                 not used to int

erpret



                                                                 this result as



                                                                 normal/abnormal

.



[facility] CenterYugidluJHGMKABXGM1132-97-80 09:36:00





             Test Item    Value        Reference Range Interpretation Comments

 

             Eosinophils # (test code 0.2          See_Comment                [A

utomated message] The



             = Eosinophils #)                                        system whic

h generated



                                                                 this result tra

nsmitted



                                                                 reference range

: <=0.5.



                                                                 The reference r

zhen was



                                                                 not used to int

erpret



                                                                 this result as



                                                                 normal/abnormal

.



[facility] CenterWsgmubbLJLCXIQDBR8017-10-66 09:36:00





             Test Item    Value        Reference Range Interpretation Comments

 

             Lymphocytes (test code = Lymphocytes) 26.1         20.0-40.0       

          



[facility] CenterRvzoxvtAJYRHEYMEZ8129-88-27 09:36:00





             Test Item    Value        Reference Range Interpretation Comments

 

             Segs (test code = Segs) 59.3         45.0-75.0                 



[facility] CenterHvegxqtTJVUHBCZER5571-16-46 09:36:00





             Test Item    Value        Reference Range Interpretation Comments

 

             Basophils (test code = 0.8          See_Comment                [Aut

omated message] The



             Basophils)                                          system which ge

nerated



                                                                 this result tra

nsmitted



                                                                 reference range

: <=1.0.



                                                                 The reference r

zhen was



                                                                 not used to int

erpret



                                                                 this result as



                                                                 normal/abnormal

.



[facility] CenterLbnaicqPIGAZDGJJJ8162-44-18 09:36:00





             Test Item    Value        Reference Range Interpretation Comments

 

             Monocytes (test code = Monocytes) 10.5         2.0-12.0            

      



[facility] CenterHqcagtgPAJBNOGIIN8067-81-71 09:36:00





             Test Item    Value        Reference Range Interpretation Comments

 

             Eosinophils (test code = 3.3          See_Comment                [A

utomated message] The



             Eosinophils)                                        system which ge

nerated



                                                                 this result tra

nsmitted



                                                                 reference range

: <=4.0.



                                                                 The reference r

zhen was



                                                                 not used to int

erpret



                                                                 this result as



                                                                 normal/abnormal

.



[facility] CenterHodbimuFIJQPUTUVK4733-34-44 09:36:00





             Test Item    Value        Reference Range Interpretation Comments

 

             Segs-Bands # (test code = Segs-Bands #) 3.9          1.5-8.1       

            



[facility]2018-05-07 09:36:00





             Test Item    Value        Reference Range Interpretation Comments

 

             Globulin (test code = Globulin) 4.4          2.7-4.2               

    



[facility]2018-05-07 09:36:00





             Test Item    Value        Reference Range Interpretation Comments

 

             A/G Ratio (test code = A/G Ratio) 0.6 1        0.7-1.6             

      



[facility]2018-05-07 09:36:00





             Test Item    Value        Reference Range Interpretation Comments

 

             B/C Ratio (test code = B/C Ratio) 17 1         6-25                

      



[place] [address]34-05-07 09:36:00





             Test Item    Value        Reference Range Interpretation Comments

 

             AGAP (test code = AGAP) 11.3         10.0-20.0                 



[facility]2018-05-07 09:36:00





             Test Item    Value        Reference Range Interpretation Comments

 

             eGFR (test code = eGFR) 134                                    



[place] 985498-05-07 09:36:00





             Test Item    Value        Reference Range Interpretation Comments

 

             Creatinine Lvl (test code = Creatinine 0.84         0.50-1.40      

           



             Lvl)                                                



[facility]2018-05-07 09:36:00





             Test Item    Value        Reference Range Interpretation Comments

 

             Sodium Lvl (test code = Sodium Lvl) 142          135-145           

        



[facility]2018-05-07 09:36:00





             Test Item    Value        Reference Range Interpretation Comments

 

             Glucose Lvl (test code = Glucose Lvl) 99           70-99           

          



[place] 985498-05-07 09:36:00





             Test Item    Value        Reference Range Interpretation Comments

 

             BUN (test code = BUN) 14           7-22                      



[facility]2018-05-07 09:36:00





             Test Item    Value        Reference Range Interpretation Comments

 

             Alk Phos (test code = Alk Phos) 79                           

    



[facility]2018-05-07 09:36:00





             Test Item    Value        Reference Range Interpretation Comments

 

             Bili Total (test code = Bili Total) 0.3          0.2-1.3           

        



[place] 985498-05-07 09:36:00





             Test Item    Value        Reference Range Interpretation Comments

 

             AST (test code = AST) 14           See_Comment                [Auto

mated message] The



                                                                 system which ge

nerated this



                                                                 result transmit

huma



                                                                 reference range

: <=37. The



                                                                 reference range

 was not



                                                                 used to interpr

et this



                                                                 result as zayda

l/abnormal.



[facility]2018-05-07 09:36:00





             Test Item    Value        Reference Range Interpretation Comments

 

             ALT (test code = ALT) 43           See_Comment                [Auto

mated message] The



                                                                 system which ge

nerated this



                                                                 result transmit

huma



                                                                 reference range

: <=65. The



                                                                 reference range

 was not



                                                                 used to interpr

et this



                                                                 result as zayda

l/abnormal.



[facility]2018-05-07 09:36:00





             Test Item    Value        Reference Range Interpretation Comments

 

             Total Protein (test code = Total 7.1          6.4-8.4              

     



             Protein)                                            



[place] [id number] 09:36:00





             Test Item    Value        Reference Range Interpretation Comments

 

             Albumin Lvl (test code = Albumin Lvl) 2.7          3.5-5.0         

          



[facility]2018-05-07 09:36:00





             Test Item    Value        Reference Range Interpretation Comments

 

             Calcium Lvl (test code = Calcium Lvl) 9.2          8.5-10.5        

          



[facility]2018-05-07 09:36:00





             Test Item    Value        Reference Range Interpretation Comments

 

             CO2 (test code = CO2) 21           24-32                     



[facility]2018-05-07 09:36:00





             Test Item    Value        Reference Range Interpretation Comments

 

             Potassium Lvl (test code = Potassium 4.3          3.5-5.1          

         



             Lvl)                                                



[facility]2018-05-07 09:36:00





             Test Item    Value        Reference Range Interpretation Comments

 

             Chloride Lvl (test code = Chloride Lvl) 114                  

            



[facility]EmpabtpRUHYJWNEUS0398[facility]85-13 09[facility]36:00





             Test Item    Value        Reference Range Interpretation Comments

 

             MCHC (test code = MCHC) 32.5         32.0-36.0                 



[facility] CenterHyozinlTOUVOYLPNJ4611-45-56 09:36:00





             Test Item    Value        Reference Range Interpretation Comments

 

             RDW (test code = RDW) 17.5         11.5-14.5                 



[facility] CenterNprocznHOMCOJMCZP9664-13-14 09:36:00





             Test Item    Value        Reference Range Interpretation Comments

 

             Platelet (test code = Platelet) 400          133-450               

    



[facility] CenterZbqsvqbXRZDDXEFPA6296-67-15 09:36:00





             Test Item    Value        Reference Range Interpretation Comments

 

             MPV (test code = MPV) 8.5          7.4-10.4                  



[facility] CenterWsdqnhkGPSSQNYBWE3889-54-82 09:36:00





             Test Item    Value        Reference Range Interpretation Comments

 

             WBC (test code = WBC) 6.6          3.7-10.4                  



[facility] CenterPynhdllURMCXYOVNP3125-34-07 09:36:00





             Test Item    Value        Reference Range Interpretation Comments

 

             RBC (test code = RBC) 5.17         4.70-6.10                 



[place] 228218-05-07 09:36:00





             Test Item    Value        Reference Range Interpretation Comments

 

             MCV (test code = MCV) 86.1         80.0-94.0                 



[place] 228218-05-07 09:36:00





             Test Item    Value        Reference Range Interpretation Comments

 

             Hct (test code = Hct) 44.5         42.0-54.0                 



[facility] CenterAiezlveOZNQXPZCJS8241-26-18 09:36:00





             Test Item    Value        Reference Range Interpretation Comments

 

             MCH (test code = MCH) 28.0 pg      27.0-31.0                 



[facility] CenterEemaxhgEVNZCXQNJR2431-87-30 09:36:00





             Test Item    Value        Reference Range Interpretation Comments

 

             Hgb (test code = Hgb) 14.5         14.0-18.0                 



[facility] CenterOnqmgbfVTHLACKEWD0236-41-33 09:36:00





             Test Item    Value        Reference Range Interpretation Comments

 

             Lymphocytes # (test code = Lymphocytes 1.7          1.0-5.5        

           



             #)                                                  



[facility] CenterKctsdzpSPJKKGSCKU5974-71-85 09:36:00





             Test Item    Value        Reference Range Interpretation Comments

 

             Monocytes # (test code 0.7          See_Comment                [Aut

omated message] The



             = Monocytes #)                                        system which 

generated



                                                                 this result tra

nsmitted



                                                                 reference range

: <=0.8.



                                                                 The reference r

zhen was



                                                                 not used to int

erpret



                                                                 this result as



                                                                 normal/abnormal

.



[facility] CenterAfemtkhNUKGOSQNCV9488-57-55 09:36:00





             Test Item    Value        Reference Range Interpretation Comments

 

             Eosinophils # (test code 0.2          See_Comment                [A

utomated message] The



             = Eosinophils #)                                        system whic

h generated



                                                                 this result tra

nsmitted



                                                                 reference range

: <=0.5.



                                                                 The reference r

zhen was



                                                                 not used to int

erpret



                                                                 this result as



                                                                 normal/abnormal

.



[facility] CenterPffcfdoEJEPRVJSQO8666-40-49 09:36:00





             Test Item    Value        Reference Range Interpretation Comments

 

             Lymphocytes (test code = Lymphocytes) 26.1         20.0-40.0       

          



[facility] CenterJbpmhhoLQGUWIUGGX5126-53-61 09:36:00





             Test Item    Value        Reference Range Interpretation Comments

 

             Segs (test code = Segs) 59.3         45.0-75.0                 



[facility] CenterCucineeMEDETUKVSG8684-44-61 09:36:00





             Test Item    Value        Reference Range Interpretation Comments

 

             Basophils (test code = 0.8          See_Comment                [Aut

omated message] The



             Basophils)                                          system which ge

nerated



                                                                 this result tra

nsmitted



                                                                 reference range

: <=1.0.



                                                                 The reference r

zhen was



                                                                 not used to int

erpret



                                                                 this result as



                                                                 normal/abnormal

.



[facility] CenterHggbommITAIOVFVOR9048-04-62 09:36:00





             Test Item    Value        Reference Range Interpretation Comments

 

             Monocytes (test code = Monocytes) 10.5         2.0-12.0            

      



[facility] CenterLlyyhfsMQPODVHSDX9958-53-17 09:36:00





             Test Item    Value        Reference Range Interpretation Comments

 

             Eosinophils (test code = 3.3          See_Comment                [A

utomated message] The



             Eosinophils)                                        system which ge

nerated



                                                                 this result tra

nsmitted



                                                                 reference range

: <=4.0.



                                                                 The reference r

zhen was



                                                                 not used to int

erpret



                                                                 this result as



                                                                 normal/abnormal

.



[facility] CenterBkhbwqlACWDMKFUIW5541-58-46 09:36:00





             Test Item    Value        Reference Range Interpretation Comments

 

             Segs-Bands # (test code = Segs-Bands #) 3.9          1.5-8.1       

            



[facility]2018-05-07 09:36:00





             Test Item    Value        Reference Range Interpretation Comments

 

             Globulin (test code = Globulin) 4.4          2.7-4.2               

    



[facility]2018-05-07 09:36:00





             Test Item    Value        Reference Range Interpretation Comments

 

             A/G Ratio (test code = A/G Ratio) 0.6 1        0.7-1.6             

      



[facility]2018-05-07 09:36:00





             Test Item    Value        Reference Range Interpretation Comments

 

             B/C Ratio (test code = B/C Ratio) 17 1         6-25                

      



[facility]2018-05-07 09:36:00





             Test Item    Value        Reference Range Interpretation Comments

 

             AGAP (test code = AGAP) 11.3         10.0-20.0                 



[facility]2018-05-07 09:36:00





             Test Item    Value        Reference Range Interpretation Comments

 

             eGFR (test code = eGFR) 134                                    



[facility]2018-05-07 09:36:00





             Test Item    Value        Reference Range Interpretation Comments

 

             Creatinine Lvl (test code = Creatinine 0.84         0.50-1.40      

           



             Lvl)                                                



[facility]2018-05-07 09:36:00





             Test Item    Value        Reference Range Interpretation Comments

 

             Sodium Lvl (test code = Sodium Lvl) 142          135-145           

        



[facility]2018-05-07 09:36:00





             Test Item    Value        Reference Range Interpretation Comments

 

             Glucose Lvl (test code = Glucose Lvl) 99           70-99           

          



[facility]2018-05-07 09:36:00





             Test Item    Value        Reference Range Interpretation Comments

 

             BUN (test code = BUN) 14           7-22                      



[facility]2018-05-07 09:36:00





             Test Item    Value        Reference Range Interpretation Comments

 

             Alk Phos (test code = Alk Phos) 79                           

    



[facility]2018-05-07 09:36:00





             Test Item    Value        Reference Range Interpretation Comments

 

             Bili Total (test code = Bili Total) 0.3          0.2-1.3           

        



[place] [id number] 09:36:00





             Test Item    Value        Reference Range Interpretation Comments

 

             AST (test code = AST) 14           See_Comment                [Auto

mated message] The



                                                                 system which ge

nerated this



                                                                 result transmit

huma



                                                                 reference range

: <=37. The



                                                                 reference range

 was not



                                                                 used to interpr

et this



                                                                 result as zayda

l/abnormal.



[facility]2018-05-07 09:36:00





             Test Item    Value        Reference Range Interpretation Comments

 

             ALT (test code = ALT) 43           See_Comment                [Auto

mated message] The



                                                                 system which ge

nerated this



                                                                 result transmit

huma



                                                                 reference range

: <=65. The



                                                                 reference range

 was not



                                                                 used to interpr

et this



                                                                 result as zayda

l/abnormal.



[facility]2018-05-07 09:36:00





             Test Item    Value        Reference Range Interpretation Comments

 

             Total Protein (test code = Total 7.1          6.4-8.4              

     



             Protein)                                            



[facility]2018-05-07 09:36:00





             Test Item    Value        Reference Range Interpretation Comments

 

             Albumin Lvl (test code = Albumin Lvl) 2.7          3.5-5.0         

          



[facility]2018-05-07 09:36:00





             Test Item    Value        Reference Range Interpretation Comments

 

             Calcium Lvl (test code = Calcium Lvl) 9.2          8.5-10.5        

          



[facility]2018-05-07 09:36:00





             Test Item    Value        Reference Range Interpretation Comments

 

             CO2 (test code = CO2) 21           24-32                     



[facility]2018-05-07 09:36:00





             Test Item    Value        Reference Range Interpretation Comments

 

             Potassium Lvl (test code = Potassium 4.3          3.5-5.1          

         



             Lvl)                                                



[facility]2018-05-07 09:36:00





             Test Item    Value        Reference Range Interpretation Comments

 

             Chloride Lvl (test code = Chloride Lvl) 114                  

            



[facility]OV[facility]32[facility]





             Test Item    Value        Reference Range Interpretation Comments

 

             MCHC (test code = MCHC) 32.5         32.0-36.0                 



[facility] CenterFbibgfsJMKLASMSJU2020-82-06 09:36:00





             Test Item    Value        Reference Range Interpretation Comments

 

             RDW (test code = RDW) 17.5         11.5-14.5                 



[facility] CenterIskjcisKBOCHKJFKJ9704-53-75 09:36:00





             Test Item    Value        Reference Range Interpretation Comments

 

             Platelet (test code = Platelet) 400          133-450               

    



[facility] CenterEjcrhypFQAMBGZGOI7448-40-39 09:36:00





             Test Item    Value        Reference Range Interpretation Comments

 

             MPV (test code = MPV) 8.5          7.4-10.4                  



[facility] CenterFzsiupiPQYQJZVMUJ3891-15-82 09:36:00





             Test Item    Value        Reference Range Interpretation Comments

 

             WBC (test code = WBC) 6.6          3.7-10.4                  



[facility] CenterAcudixjSDOERELCMO2136-85-19 09:36:00





             Test Item    Value        Reference Range Interpretation Comments

 

             RBC (test code = RBC) 5.17         4.70-6.10                 



[facility] CenterUematotDLGAAYLRTV4343-52-10 09:36:00





             Test Item    Value        Reference Range Interpretation Comments

 

             MCV (test code = MCV) 86.1         80.0-94.0                 



[facility] CenterZwqroxfFAHXHQPZLK2664-54-91 09:36:00





             Test Item    Value        Reference Range Interpretation Comments

 

             Hct (test code = Hct) 44.5         42.0-54.0                 



[facility] CenterXxjzwagRIMFWBTBTB0572-08-40 09:36:00





             Test Item    Value        Reference Range Interpretation Comments

 

             MCH (test code = MCH) 28.0 pg      27.0-31.0                 



[facility] CenterRtbroxjWQWFBKVOVS4697-81-00 09:36:00





             Test Item    Value        Reference Range Interpretation Comments

 

             Hgb (test code = Hgb) 14.5         14.0-18.0                 



[facility] CenterLyitrseMCUMFGPMZH5421-03-17 09:36:00





             Test Item    Value        Reference Range Interpretation Comments

 

             Lymphocytes # (test code = Lymphocytes 1.7          1.0-5.5        

           



             #)                                                  



[facility] CenterPoaateiPGDKKKIOMK7013-30-75 09:36:00





             Test Item    Value        Reference Range Interpretation Comments

 

             Monocytes # (test code 0.7          See_Comment                [Aut

omated message] The



             = Monocytes #)                                        system which 

generated



                                                                 this result tra

nsmitted



                                                                 reference range

: <=0.8.



                                                                 The reference r

zhen was



                                                                 not used to int

erpret



                                                                 this result as



                                                                 normal/abnormal

.



[facility] CenterOdxeqodXJADRVCRQQ9625-18-41 09:36:00





             Test Item    Value        Reference Range Interpretation Comments

 

             Eosinophils # (test code 0.2          See_Comment                [A

utomated message] The



             = Eosinophils #)                                        system whic

h generated



                                                                 this result tra

nsmitted



                                                                 reference range

: <=0.5.



                                                                 The reference r

zhen was



                                                                 not used to int

erpret



                                                                 this result as



                                                                 normal/abnormal

.



[facility] CenterKpxfrpfGCCMFYQCBF6063-50-65 09:36:00





             Test Item    Value        Reference Range Interpretation Comments

 

             Lymphocytes (test code = Lymphocytes) 26.1         20.0-40.0       

          



[facility] CenterZjyhwdmWDSRCYFIEQ9079-66-28 09:36:00





             Test Item    Value        Reference Range Interpretation Comments

 

             Segs (test code = Segs) 59.3         45.0-75.0                 



[facility] CenterLmlbxsdOZPJKJTHMC5558-75-90 09:36:00





             Test Item    Value        Reference Range Interpretation Comments

 

             Basophils (test code = 0.8          See_Comment                [Aut

omated message] The



             Basophils)                                          system which ge

nerated



                                                                 this result tra

nsmitted



                                                                 reference range

: <=1.0.



                                                                 The reference r

zhen was



                                                                 not used to int

erpret



                                                                 this result as



                                                                 normal/abnormal

.



[facility] CenterSyfelpjUBWDCYXFQM8233-69-28 09:36:00





             Test Item    Value        Reference Range Interpretation Comments

 

             Monocytes (test code = Monocytes) 10.5         2.0-12.0            

      



[facility] CenterDwkeufgHGYGALWTTZ7303-49-98 09:36:00





             Test Item    Value        Reference Range Interpretation Comments

 

             Eosinophils (test code = 3.3          See_Comment                [A

utomated message] The



             Eosinophils)                                        system which ge

nerated



                                                                 this result tra

nsmitted



                                                                 reference range

: <=4.0.



                                                                 The reference r

zhen was



                                                                 not used to int

erpret



                                                                 this result as



                                                                 normal/abnormal

.



[facility] CenterRnsokzfKUZZUJBVLI8638-41-90 09:36:00





             Test Item    Value        Reference Range Interpretation Comments

 

             Segs-Bands # (test code = Segs-Bands #) 3.9          1.5-8.1       

            



[place] [id number] 21:02:00





             Test Item    Value        Reference Range Interpretation Comments

 

             Vanco Tr TND (test code = Vanco Tr 15:30pm                         

       



             TND)                                                



[place] [id number] 21:02:00





             Test Item    Value        Reference Range Interpretation Comments

 

             Vanco Tr (test code = Vanco Tr) 9.1                                

    



[facility]FvtqpyvVXACOHTCWO8478-54-26 [facility]:02:00





             Test Item    Value        Reference Range Interpretation Comments

 

             Vanco Tr TND (test code = Vanco Tr 15:30pm                         

       



             TND)                                                



[facility] HospitalFdtwdqlYGIAFGUAAU7671-83-64 21:02:00





             Test Item    Value        Reference Range Interpretation Comments

 

             Vanco Tr (test code = Vanco Tr) 9.1                                

    



[facility] HospitalMfqokqiLYAVPNCOAH1670-86-01 21:02:00





             Test Item    Value        Reference Range Interpretation Comments

 

             Vanco Tr TND (test code = Vanco Tr 15:30pm                         

       



             TND)                                                



[facility] HospitalDqqlthbHAUYKMSYHM6345-98-09 21:02:00





             Test Item    Value        Reference Range Interpretation Comments

 

             Vanco Tr (test code = Vanco Tr) 9.1                                

    



[facility] HospitalRjjlarhWLOPBGEBIV2480-80-90 21:02:00





             Test Item    Value        Reference Range Interpretation Comments

 

             Vanco Tr TND (test code = Vanco Tr 15:30pm                         

       



             TND)                                                



[facility]uebcdLQOEIIAIXO3112[facility]75-78 [facility]02:00





             Test Item    Value        Reference Range Interpretation Comments

 

             Vanco Tr (test code = Vanco Tr) 9.1                                

    



[facility] HospitalTfcsiegIXFIIQQDRX8445-65-46 21:02:00





             Test Item    Value        Reference Range Interpretation Comments

 

             Vanco Tr TND (test code = Vanco Tr 15:30pm                         

       



             TND)                                                



[facility]Xbm[facility]zmNWHHPGIMRQ5628[facility]91-74 21:02:00





             Test Item    Value        Reference Range Interpretation Comments

 

             Vanco Tr (test code = Vanco Tr) 9.1                                

    



[facility] HospitalVipjrzeLQJBIWZYNY0319-86-01 21:02:00





             Test Item    Value        Reference Range Interpretation Comments

 

             Vanco Tr TND (test code = Vanco Tr 15:30pm                         

       



             TND)                                                



[facility] HospitalOogyvpoJOQIDNPAAD0367-18-68 21:02:00





             Test Item    Value        Reference Range Interpretation Comments

 

             Vanco Tr (test code = Vanco Tr) 9.1                                

    



[facility] HospitalCaprsjnWUNSZXVUSZ1228-84-42 21:02:00





             Test Item    Value        Reference Range Interpretation Comments

 

             Vanco Tr TND (test code = Vanco Tr 15:30pm                         

       



             TND)                                                



[facility] HospitalZdqizlfOUEMTIQAIH1223-79-72 21:02:00





             Test Item    Value        Reference Range Interpretation Comments

 

             Vanco Tr (test code = Vanco Tr) 9.1                                

    



[place] [id number] 21:02:00





             Test Item    Value        Reference Range Interpretation Comments

 

             Vanco Tr TND (test code = Vanco Tr 15:30pm                         

       



             TND)                                                



[place] [id number] 21:02:00





             Test Item    Value        Reference Range Interpretation Comments

 

             Vanco Tr (test code = Vanco Tr) 9.1                                

    



[facility]2018-05-06 07:07:00





             Test Item    Value        Reference Range Interpretation Comments

 

             Glucose Lvl (test code = Glucose Lvl) 74           70-99           

          



[facility]2018-05-06 07:07:00





             Test Item    Value        Reference Range Interpretation Comments

 

             BUN (test code = BUN) 12           7-22                      



[facility]2018-05-06 07:07:00





             Test Item    Value        Reference Range Interpretation Comments

 

             Creatinine Lvl (test code = Creatinine 0.75         0.50-1.40      

           



             Lvl)                                                



[facility]2018-05-06 07:07:00





             Test Item    Value        Reference Range Interpretation Comments

 

             eGFR (test code = eGFR) 140                                    



[facility]2018-05-06 07:07:00





             Test Item    Value        Reference Range Interpretation Comments

 

             Albumin Lvl (test code = Albumin Lvl) 2.9          3.5-5.0         

          



[facility]2018-05-06 07:07:00





             Test Item    Value        Reference Range Interpretation Comments

 

             Globulin (test code = Globulin) 4.4          2.7-4.2               

    



[facility]2018-05-06 07:07:00





             Test Item    Value        Reference Range Interpretation Comments

 

             A/G Ratio (test code = A/G Ratio) 0.7 1        0.7-1.6             

      



[facility]2018-05-06 07:07:00





             Test Item    Value        Reference Range Interpretation Comments

 

             Bili Total (test code = Bili Total) 0.4          0.2-1.3           

        



[facility]2018-05-06 07:07:00





             Test Item    Value        Reference Range Interpretation Comments

 

             Alk Phos (test code = Alk Phos) 71                           

    



[facility]2018-05-06 07:07:00





             Test Item    Value        Reference Range Interpretation Comments

 

             AST (test code = AST) 15           See_Comment                [Auto

mated message] The



                                                                 system which ge

nerated this



                                                                 result transmit

huma



                                                                 reference range

: <=37. The



                                                                 reference range

 was not



                                                                 used to interpr

et this



                                                                 result as zayda

l/abnormal.



[facility]2018-05-06 07:07:00





             Test Item    Value        Reference Range Interpretation Comments

 

             ALT (test code = ALT) 28           See_Comment                [Auto

mated message] The



                                                                 system which ge

nerated this



                                                                 result transmit

huma



                                                                 reference range

: <=65. The



                                                                 reference range

 was not



                                                                 used to interpr

et this



                                                                 result as zayda

l/abnormal.



[facility]2018-05-06 07:07:00





             Test Item    Value        Reference Range Interpretation Comments

 

             Potassium Lvl (test code = Potassium 4.4          3.5-5.1          

         



             Lvl)                                                



[facility]2018-05-06 07:07:00





             Test Item    Value        Reference Range Interpretation Comments

 

             Sodium Lvl (test code = Sodium Lvl) 147          135-145           

        



[facility]2018-05-06 07:07:00





             Test Item    Value        Reference Range Interpretation Comments

 

             CO2 (test code = CO2) 25           24-32                     



[facility]2018-05-06 07:07:00





             Test Item    Value        Reference Range Interpretation Comments

 

             Chloride Lvl (test code = Chloride Lvl) 114                  

            



[place] 985498-05-06 07:07:00





             Test Item    Value        Reference Range Interpretation Comments

 

             B/C Ratio (test code = B/C Ratio) 16 1         6-25                

      



[place] 985498-05-06 07:07:00





             Test Item    Value        Reference Range Interpretation Comments

 

             Calcium Lvl (test code = Calcium Lvl) 8.7          8.5-10.5        

          



[place] 985498-05-06 07:07:00





             Test Item    Value        Reference Range Interpretation Comments

 

             AGAP (test code = AGAP) 12.4         10.0-20.0                 



[place] 985498-05-06 07:07:00





             Test Item    Value        Reference Range Interpretation Comments

 

             Total Protein (test code = Total 7.3          6.4-8.4              

     



             Protein)                                            



[facility] CenterZleersfQOVPPLIYPS8767-44-85 [facility]07:[facility]





             Test Item    Value        Reference Range Interpretation Comments

 

             Platelet (test code = Platelet) 345          133-450               

    



[facility] CenterNmkklryLAXYTLBARB4340-11-81 07:07:00





             Test Item    Value        Reference Range Interpretation Comments

 

             MPV (test code = MPV) 8.7          7.4-10.4                  



[facility] CenterOtymuwsMPCHEYSVKY8663-74-44 07:07:00





             Test Item    Value        Reference Range Interpretation Comments

 

             WBC (test code = WBC) 6.9          3.7-10.4                  



[facility] CenterXphlgbpQGYJFIFSRL1262-51-95 07:07:00





             Test Item    Value        Reference Range Interpretation Comments

 

             RBC (test code = RBC) 5.30         4.70-6.10                 



[facility] CenterBbyoganFEHQMRUWVT9317-65-15 07:07:00





             Test Item    Value        Reference Range Interpretation Comments

 

             MCHC (test code = MCHC) 32.8         32.0-36.0                 



[facility] CenterDakmivjWXYCTVOSYT8127-66-65 07:07:00





             Test Item    Value        Reference Range Interpretation Comments

 

             MCH (test code = MCH) 27.9 pg      27.0-31.0                 



[facility] CenterQdlqmrnHHCIGCXYLN7198-10-40 07:07:00





             Test Item    Value        Reference Range Interpretation Comments

 

             Hgb (test code = Hgb) 14.8         14.0-18.0                 



[facility] CenterOvavwbxVFTBSGVTBU9131-92-83 07:07:00





             Test Item    Value        Reference Range Interpretation Comments

 

             MCV (test code = MCV) 85.0         80.0-94.0                 



[facility] CenterLgqccmaKYZPDLNWID7317-01-62 07:07:00





             Test Item    Value        Reference Range Interpretation Comments

 

             Hct (test code = Hct) 45.1         42.0-54.0                 



[facility] CenterRilrfabMQHKOUBGAR0312-88-28 07:07:00





             Test Item    Value        Reference Range Interpretation Comments

 

             RDW (test code = RDW) 17.5         11.5-14.5                 



[facility] CenterCraalsxDELWJPXCGB9392-42-71 07:07:00





             Test Item    Value        Reference Range Interpretation Comments

 

             Eosinophils # (test code 0.2          See_Comment                [A

utomated message] The



             = Eosinophils #)                                        system whic

h generated



                                                                 this result tra

nsmitted



                                                                 reference range

: <=0.5.



                                                                 The reference r

zhen was



                                                                 not used to int

erpret



                                                                 this result as



                                                                 normal/abnormal

.



[facility] CenterKmczwizHGBGIOSTNF7476-52-53 07:07:00





             Test Item    Value        Reference Range Interpretation Comments

 

             Lymphocytes # (test code = Lymphocytes 2.0          1.0-5.5        

           



             #)                                                  



[facility] CenterKxfpwguKEUXRREZVR2587-66-98 07:07:00





             Test Item    Value        Reference Range Interpretation Comments

 

             Monocytes # (test code 0.7          See_Comment                [Aut

omated message] The



             = Monocytes #)                                        system which 

generated



                                                                 this result tra

nsmitted



                                                                 reference range

: <=0.8.



                                                                 The reference r

zhen was



                                                                 not used to int

erpret



                                                                 this result as



                                                                 normal/abnormal

.



[facility] CenterVlxbxjiQVKSFZNHWN9788-59-46 07:07:00





             Test Item    Value        Reference Range Interpretation Comments

 

             Eosinophils (test code = 2.4          See_Comment                [A

utomated message] The



             Eosinophils)                                        system which ge

nerated



                                                                 this result tra

nsmitted



                                                                 reference range

: <=4.0.



                                                                 The reference r

zhen was



                                                                 not used to int

erpret



                                                                 this result as



                                                                 normal/abnormal

.



[facility] CenterFmhkgyrDUOPWWQEHC7454-51-26 07:07:00





             Test Item    Value        Reference Range Interpretation Comments

 

             Basophils (test code = 0.6          See_Comment                [Aut

omated message] The



             Basophils)                                          system which ge

nerated



                                                                 this result tra

nsmitted



                                                                 reference range

: <=1.0.



                                                                 The reference r

zhen was



                                                                 not used to int

erpret



                                                                 this result as



                                                                 normal/abnormal

.



[facility] CenterWurgzxqDAPMLZCMWU0164-66-03 07:07:00





             Test Item    Value        Reference Range Interpretation Comments

 

             Segs-Bands # (test code = Segs-Bands #) 4.0          1.5-8.1       

            



[facility] CenterHijokenFCHCGPXGOR4390-27-83 07:07:00





             Test Item    Value        Reference Range Interpretation Comments

 

             Monocytes (test code = Monocytes) 10.4         2.0-12.0            

      



[facility] CenterCrpfrncMNNMWQIWDF1936-21-12 07:07:00





             Test Item    Value        Reference Range Interpretation Comments

 

             RBC Morph (test code = Normal (18 2:07 AM)                     

      



             RBC Morph)                                          



[facility] CenterWlkrumoEVIDIIGLVL8046-75-32 07:07:00





             Test Item    Value        Reference Range Interpretation Comments

 

             Segs (test code = Segs) 58.1         45.0-75.0                 



[facility] CenterCvuergnGSEKAEOTFF4816-20-50 07:07:00





             Test Item    Value        Reference Range Interpretation Comments

 

             Plt Morph (test code = Normal (18 2:07 AM)                     

      



             Plt Morph)                                          



[facility] CenterYvgbtyzWNZDTYIQTX5575-90-78 07:07:00





             Test Item    Value        Reference Range Interpretation Comments

 

             Lymphocytes (test code = Lymphocytes) 28.5         20.0-40.0       

          



[facility]2018-05-06 07:07:00





             Test Item    Value        Reference Range Interpretation Comments

 

             Glucose Lvl (test code = Glucose Lvl) 74           70-99           

          



[place] 985498-05-06 07:07:00





             Test Item    Value        Reference Range Interpretation Comments

 

             BUN (test code = BUN) 12           7-22                      



[place] 985498-05-06 07:07:00





             Test Item    Value        Reference Range Interpretation Comments

 

             Creatinine Lvl (test code = Creatinine 0.75         0.50-1.40      

           



             Lvl)                                                



[place] [address]-05-06 07:07:00





             Test Item    Value        Reference Range Interpretation Comments

 

             eGFR (test code = eGFR) 140                                    



[place] 985498-05-06 07:07:00





             Test Item    Value        Reference Range Interpretation Comments

 

             Albumin Lvl (test code = Albumin Lvl) 2.9          3.5-5.0         

          



[place] 985498-05-06 07:07:00





             Test Item    Value        Reference Range Interpretation Comments

 

             Globulin (test code = Globulin) 4.4          2.7-4.2               

    



[place] [address]-05-06 07:07:00





             Test Item    Value        Reference Range Interpretation Comments

 

             A/G Ratio (test code = A/G Ratio) 0.7 1        0.7-1.6             

      



[place] [address]-05-06 07:07:00





             Test Item    Value        Reference Range Interpretation Comments

 

             Bili Total (test code = Bili Total) 0.4          0.2-1.3           

        



[place] [address]-05-06 07:07:00





             Test Item    Value        Reference Range Interpretation Comments

 

             Alk Phos (test code = Alk Phos) 71                           

    



[place] 985498-05-06 07:07:00





             Test Item    Value        Reference Range Interpretation Comments

 

             AST (test code = AST) 15           See_Comment                [Auto

mated message] The



                                                                 system which ge

nerated this



                                                                 result transmit

huma



                                                                 reference range

: <=37. The



                                                                 reference range

 was not



                                                                 used to interpr

et this



                                                                 result as zayda

l/abnormal.



[place] [address]-05-06 07:07:00





             Test Item    Value        Reference Range Interpretation Comments

 

             ALT (test code = ALT) 28           See_Comment                [Auto

mated message] The



                                                                 system which ge

nerated this



                                                                 result transmit

huma



                                                                 reference range

: <=65. The



                                                                 reference range

 was not



                                                                 used to interpr

et this



                                                                 result as zayda

l/abnormal.



[place] 985498-05-06 07:07:00





             Test Item    Value        Reference Range Interpretation Comments

 

             Potassium Lvl (test code = Potassium 4.4          3.5-5.1          

         



             Lvl)                                                



[facility]2018-05-06 07:07:00





             Test Item    Value        Reference Range Interpretation Comments

 

             Sodium Lvl (test code = Sodium Lvl) 147          135-145           

        



[facility]2018-05-06 07:07:00





             Test Item    Value        Reference Range Interpretation Comments

 

             CO2 (test code = CO2) 25           24-32                     



[facility]2018-05-06 07:07:00





             Test Item    Value        Reference Range Interpretation Comments

 

             Chloride Lvl (test code = Chloride Lvl) 114                  

            



[place] 985498-05-06 07:07:00





             Test Item    Value        Reference Range Interpretation Comments

 

             B/C Ratio (test code = B/C Ratio) 16 1         6-25                

      



[place] 985498-05-06 07:07:00





             Test Item    Value        Reference Range Interpretation Comments

 

             Calcium Lvl (test code = Calcium Lvl) 8.7          8.5-10.5        

          



[facility]2018-05-06 07:07:00





             Test Item    Value        Reference Range Interpretation Comments

 

             AGAP (test code = AGAP) 12.4         10.0-20.0                 



[facility]2018-05-06 07:07:00





             Test Item    Value        Reference Range Interpretation Comments

 

             Total Protein (test code = Total 7.3          6.4-8.4              

     



             Protein)                                            



[facility] CenterLelplklQODOLQPGAL6973-69-11 07:07:00





             Test Item    Value        Reference Range Interpretation Comments

 

             Platelet (test code = Platelet) 345          133-450               

    



[facility] CenterGcvnhiwLKMTWGTZSZ2400-57-63 07:07:00





             Test Item    Value        Reference Range Interpretation Comments

 

             MPV (test code = MPV) 8.7          7.4-10.4                  



[facility] CenterBuuomxkPAKBOFBCZM4545-01-51 07:07:00





             Test Item    Value        Reference Range Interpretation Comments

 

             WBC (test code = WBC) 6.9          3.7-10.4                  



[facility] CenterOmwalerISMAGZCOZX3893-01-71 07:07:00





             Test Item    Value        Reference Range Interpretation Comments

 

             RBC (test code = RBC) 5.30         4.70-6.10                 



[facility] CenterSxdwvdmXNTFSAAOKW1618-94-90 07:07:00





             Test Item    Value        Reference Range Interpretation Comments

 

             MCHC (test code = MCHC) 32.8         32.0-36.0                 



[place] 228218-05-06 07:07:00





             Test Item    Value        Reference Range Interpretation Comments

 

             MCH (test code = MCH) 27.9 pg      27.0-31.0                 



[facility] CenterFbvbkvvHTDCODKGWV9439-52-70 07:07:00





             Test Item    Value        Reference Range Interpretation Comments

 

             Hgb (test code = Hgb) 14.8         14.0-18.0                 



[facility] CenterKaebmynWOOZIIUYFN6797-24-60 07:07:00





             Test Item    Value        Reference Range Interpretation Comments

 

             MCV (test code = MCV) 85.0         80.0-94.0                 



[facility] CenterUgoopaiWNOIXEGLGM2170-71-19 07:07:00





             Test Item    Value        Reference Range Interpretation Comments

 

             Hct (test code = Hct) 45.1         42.0-54.0                 



[facility] CenterAhhiwpgDMQQQZBESN0132-80-43 07:07:00





             Test Item    Value        Reference Range Interpretation Comments

 

             RDW (test code = RDW) 17.5         11.5-14.5                 



[facility] CenterRzgpnsmCLQCCHXUJH9235-13-73 07:07:00





             Test Item    Value        Reference Range Interpretation Comments

 

             Eosinophils # (test code 0.2          See_Comment                [A

utomated message] The



             = Eosinophils #)                                        system whic

h generated



                                                                 this result tra

nsmitted



                                                                 reference range

: <=0.5.



                                                                 The reference r

zhen was



                                                                 not used to int

erpret



                                                                 this result as



                                                                 normal/abnormal

.



[facility]S[id number] 07:07:00





             Test Item    Value        Reference Range Interpretation Comments

 

             Lymphocytes # (test code = Lymphocytes 2.0          1.0-5.5        

           



             #)                                                  



[facility] CenterMmhauhjBCXFCNKNYF6688-86-74 07:07:00





             Test Item    Value        Reference Range Interpretation Comments

 

             Monocytes # (test code 0.7          See_Comment                [Aut

omated message] The



             = Monocytes #)                                        system which 

generated



                                                                 this result tra

nsmitted



                                                                 reference range

: <=0.8.



                                                                 The reference r

zhen was



                                                                 not used to int

erpret



                                                                 this result as



                                                                 normal/abnormal

.



[facility] CenterQrzeyicZGYVGMPPMP9445-09-45 07:07:00





             Test Item    Value        Reference Range Interpretation Comments

 

             Eosinophils (test code = 2.4          See_Comment                [A

utomated message] The



             Eosinophils)                                        system which ge

nerated



                                                                 this result tra

nsmitted



                                                                 reference range

: <=4.0.



                                                                 The reference r

zhen was



                                                                 not used to int

erpret



                                                                 this result as



                                                                 normal/abnormal

.



[facility] CenterAumhaqsATNYMWYLEI6035-06-35 07:07:00





             Test Item    Value        Reference Range Interpretation Comments

 

             Basophils (test code = 0.6          See_Comment                [Aut

omated message] The



             Basophils)                                          system which ge

nerated



                                                                 this result tra

nsmitted



                                                                 reference range

: <=1.0.



                                                                 The reference r

zhen was



                                                                 not used to int

erpret



                                                                 this result as



                                                                 normal/abnormal

.



[facility] CenterVrjlkkvTXNKOPREHN6745-82-49 07:07:00





             Test Item    Value        Reference Range Interpretation Comments

 

             Segs-Bands # (test code = Segs-Bands #) 4.0          1.5-8.1       

            



[facility] CenterEzgplhxWYJVPBUXPQ2307-85-10 07:07:00





             Test Item    Value        Reference Range Interpretation Comments

 

             Monocytes (test code = Monocytes) 10.4         2.0-12.0            

      



[facility] CenterAsxvkzoYBUGXUPQHX5489-04-71 07:07:00





             Test Item    Value        Reference Range Interpretation Comments

 

             RBC Morph (test code = Normal (18 2:07 AM)                     

      



             RBC Morph)                                          



[facility] CenterPjorigeIHOMERHOCM3146-01-11 07:07:00





             Test Item    Value        Reference Range Interpretation Comments

 

             Segs (test code = Segs) 58.1         45.0-75.0                 



[facility] CenterScsenqlVSECQVYCDS9192-55-65 07:07:00





             Test Item    Value        Reference Range Interpretation Comments

 

             Plt Morph (test code = Normal (18 2:07 AM)                     

      



             Plt Morph)                                          



[facility] CenterPosxcfgOXJLCBKPKY7029-97-23 07:07:00





             Test Item    Value        Reference Range Interpretation Comments

 

             Lymphocytes (test code = Lymphocytes) 28.5         20.0-40.0       

          



[facility]2018-05-06 07:07:00





             Test Item    Value        Reference Range Interpretation Comments

 

             Glucose Lvl (test code = Glucose Lvl) 74           70-99           

          



[facility]2018-05-06 07:07:00





             Test Item    Value        Reference Range Interpretation Comments

 

             BUN (test code = BUN) 12           7-22                      



[facility]2018-05-06 07:07:00





             Test Item    Value        Reference Range Interpretation Comments

 

             Creatinine Lvl (test code = Creatinine 0.75         0.50-1.40      

           



             Lvl)                                                



[place] 985498-05-06 07:07:00





             Test Item    Value        Reference Range Interpretation Comments

 

             eGFR (test code = eGFR) 140                                    



[facility]2018-05-06 07:07:00





             Test Item    Value        Reference Range Interpretation Comments

 

             Albumin Lvl (test code = Albumin Lvl) 2.9          3.5-5.0         

          



[place] 985498-05-06 07:07:00





             Test Item    Value        Reference Range Interpretation Comments

 

             Globulin (test code = Globulin) 4.4          2.7-4.2               

    



[place] 985498-05-06 07:07:00





             Test Item    Value        Reference Range Interpretation Comments

 

             A/G Ratio (test code = A/G Ratio) 0.7 1        0.7-1.6             

      



[place] 985498-05-06 07:07:00





             Test Item    Value        Reference Range Interpretation Comments

 

             Bili Total (test code = Bili Total) 0.4          0.2-1.3           

        



[place] 985498-05-06 07:07:00





             Test Item    Value        Reference Range Interpretation Comments

 

             Alk Phos (test code = Alk Phos) 71                           

    



[place] 985498-05-06 07:07:00





             Test Item    Value        Reference Range Interpretation Comments

 

             AST (test code = AST) 15           See_Comment                [Auto

mated message] The



                                                                 system which ge

nerated this



                                                                 result transmit

huma



                                                                 reference range

: <=37. The



                                                                 reference range

 was not



                                                                 used to interpr

et this



                                                                 result as zayda

l/abnormal.



[place] 985498-05-06 07:07:00





             Test Item    Value        Reference Range Interpretation Comments

 

             ALT (test code = ALT) 28           See_Comment                [Auto

mated message] The



                                                                 system which ge

nerated this



                                                                 result transmit

huma



                                                                 reference range

: <=65. The



                                                                 reference range

 was not



                                                                 used to interpr

et this



                                                                 result as zayda

l/abnormal.



[facility]2018-05-06 07:07:00





             Test Item    Value        Reference Range Interpretation Comments

 

             Potassium Lvl (test code = Potassium 4.4          3.5-5.1          

         



             Lvl)                                                



[facility]2018-05-06 07:07:00





             Test Item    Value        Reference Range Interpretation Comments

 

             Sodium Lvl (test code = Sodium Lvl) 147          135-145           

        



[facility]2018-05-06 07:07:00





             Test Item    Value        Reference Range Interpretation Comments

 

             CO2 (test code = CO2) 25           24-32                     



[facility]2018-05-06 07:07:00





             Test Item    Value        Reference Range Interpretation Comments

 

             Chloride Lvl (test code = Chloride Lvl) 114                  

            



[place] 985498-05-06 07:07:00





             Test Item    Value        Reference Range Interpretation Comments

 

             B/C Ratio (test code = B/C Ratio) 16 1         6-25                

      



[facility]2018-05-06 07:07:00





             Test Item    Value        Reference Range Interpretation Comments

 

             Calcium Lvl (test code = Calcium Lvl) 8.7          8.5-10.5        

          



[facility]2018-05-06 07:07:00





             Test Item    Value        Reference Range Interpretation Comments

 

             AGAP (test code = AGAP) 12.4         10.0-20.0                 



[facility]2018-05-06 07:07:00





             Test Item    Value        Reference Range Interpretation Comments

 

             Total Protein (test code = Total 7.3          6.4-8.4              

     



             Protein)                                            



[facility] CenterTcacmngHYNBZNJRZX6811-35-70 07:07:00





             Test Item    Value        Reference Range Interpretation Comments

 

             Platelet (test code = Platelet) 345          133-450               

    



[facility] CenterItvhpxdZICTIQYSLT9883-10-54 07:07:00





             Test Item    Value        Reference Range Interpretation Comments

 

             MPV (test code = MPV) 8.7          7.4-10.4                  



[place] 228218-05-06 07:07:00





             Test Item    Value        Reference Range Interpretation Comments

 

             WBC (test code = WBC) 6.9          3.7-10.4                  



[facility] CenterPmgtnynOTHDYZLKNU4649-24-94 07:07:00





             Test Item    Value        Reference Range Interpretation Comments

 

             RBC (test code = RBC) 5.30         4.70-6.10                 



[facility] CenterHuytrcaYTPCVYOMKZ8433-91-28 07:07:00





             Test Item    Value        Reference Range Interpretation Comments

 

             MCHC (test code = MCHC) 32.8         32.0-36.0                 



[facility] CenterOmuiakrINBJRKGOFI1748-75-33 07:07:00





             Test Item    Value        Reference Range Interpretation Comments

 

             MCH (test code = MCH) 27.9 pg      27.0-31.0                 



[facility] CenterTxybjnpLEKTZQQHKH0130-48-41 07:07:00





             Test Item    Value        Reference Range Interpretation Comments

 

             Hgb (test code = Hgb) 14.8         14.0-18.0                 



[place] 228218-05-06 07:07:00





             Test Item    Value        Reference Range Interpretation Comments

 

             MCV (test code = MCV) 85.0         80.0-94.0                 



[place] 228218-05-06 07:07:00





             Test Item    Value        Reference Range Interpretation Comments

 

             Hct (test code = Hct) 45.1         42.0-54.0                 



[facility] CenterBgdjtpoCCQZUZVSER3449-37-55 07:07:00





             Test Item    Value        Reference Range Interpretation Comments

 

             RDW (test code = RDW) 17.5         11.5-14.5                 



[facility] CenterPzkbxhzAFPAJKPIMY3891-25-98 07:07:00





             Test Item    Value        Reference Range Interpretation Comments

 

             Eosinophils # (test code 0.2          See_Comment                [A

utomated message] The



             = Eosinophils #)                                        system whic

h generated



                                                                 this result tra

nsmitted



                                                                 reference range

: <=0.5.



                                                                 The reference r

zhen was



                                                                 not used to int

erpret



                                                                 this result as



                                                                 normal/abnormal

.



[facility] CenterFiyngvxSXKPIQMTVE0490-11-46 07:07:00





             Test Item    Value        Reference Range Interpretation Comments

 

             Lymphocytes # (test code = Lymphocytes 2.0          1.0-5.5        

           



             #)                                                  



[facility] CenterKarncsbROQWBVVFEC5816-13-92 07:07:00





             Test Item    Value        Reference Range Interpretation Comments

 

             Monocytes # (test code 0.7          See_Comment                [Aut

omated message] The



             = Monocytes #)                                        system which 

generated



                                                                 this result tra

nsmitted



                                                                 reference range

: <=0.8.



                                                                 The reference r

zhen was



                                                                 not used to int

erpret



                                                                 this result as



                                                                 normal/abnormal

.



[facility] CenterPenpzquFTRUYLLZST3658-91-50 07:07:00





             Test Item    Value        Reference Range Interpretation Comments

 

             Eosinophils (test code = 2.4          See_Comment                [A

utomated message] The



             Eosinophils)                                        system which ge

nerated



                                                                 this result tra

nsmitted



                                                                 reference range

: <=4.0.



                                                                 The reference r

zhen was



                                                                 not used to int

erpret



                                                                 this result as



                                                                 normal/abnormal

.



[facility] CenterGiqmkkuWLWNEEGHAT0840-98-80 07:07:00





             Test Item    Value        Reference Range Interpretation Comments

 

             Basophils (test code = 0.6          See_Comment                [Aut

omated message] The



             Basophils)                                          system which ge

nerated



                                                                 this result tra

nsmitted



                                                                 reference range

: <=1.0.



                                                                 The reference r

zhen was



                                                                 not used to int

erpret



                                                                 this result as



                                                                 normal/abnormal

.



[facility] CenterQmpbdlmZTHXGPQESI8860-72-49 07:07:00





             Test Item    Value        Reference Range Interpretation Comments

 

             Segs-Bands # (test code = Segs-Bands #) 4.0          1.5-8.1       

            



[facility] CenterSgoyqvyYTDNBCGCDS7562-99-89 07:07:00





             Test Item    Value        Reference Range Interpretation Comments

 

             Monocytes (test code = Monocytes) 10.4         2.0-12.0            

      



[facility] CenterAdnplsvKTQJUVXSEE2478-05-98 07:07:00





             Test Item    Value        Reference Range Interpretation Comments

 

             RBC Morph (test code = Normal (18 2:07 AM)                     

      



             RBC Morph)                                          



[facility] CenterSrnrliuVQWJAZDDZU1052-85-76 07:07:00





             Test Item    Value        Reference Range Interpretation Comments

 

             Segs (test code = Segs) 58.1         45.0-75.0                 



[facility] CenterJmnodcpLDNJDQZLHH2525-94-59 07:07:00





             Test Item    Value        Reference Range Interpretation Comments

 

             Plt Morph (test code = Normal (18 2:07 AM)                     

      



             Plt Morph)                                          



[facility] CenterQzhdiegZTGMVFBGQT5027-17-94 07:07:00





             Test Item    Value        Reference Range Interpretation Comments

 

             Lymphocytes (test code = Lymphocytes) 28.5         20.0-40.0       

          



[facility]2018-05-06 07:07:00





             Test Item    Value        Reference Range Interpretation Comments

 

             Glucose Lvl (test code = Glucose Lvl) 74           70-99           

          



[facility]2018-05-06 07:07:00





             Test Item    Value        Reference Range Interpretation Comments

 

             BUN (test code = BUN) 12           7-22                      



[facility]2018-05-06 07:07:00





             Test Item    Value        Reference Range Interpretation Comments

 

             Creatinine Lvl (test code = Creatinine 0.75         0.50-1.40      

           



             Lvl)                                                



[facility]2018-05-06 07:07:00





             Test Item    Value        Reference Range Interpretation Comments

 

             eGFR (test code = eGFR) 140                                    



[facility]2018-05-06 07:07:00





             Test Item    Value        Reference Range Interpretation Comments

 

             Albumin Lvl (test code = Albumin Lvl) 2.9          3.5-5.0         

          



[facility]2018-05-06 07:07:00





             Test Item    Value        Reference Range Interpretation Comments

 

             Globulin (test code = Globulin) 4.4          2.7-4.2               

    



[facility]2018-05-06 07:07:00





             Test Item    Value        Reference Range Interpretation Comments

 

             A/G Ratio (test code = A/G Ratio) 0.7 1        0.7-1.6             

      



[facility]2018-05-06 07:07:00





             Test Item    Value        Reference Range Interpretation Comments

 

             Bili Total (test code = Bili Total) 0.4          0.2-1.3           

        



[facility]2018-05-06 07:07:00





             Test Item    Value        Reference Range Interpretation Comments

 

             Alk Phos (test code = Alk Phos) 71                           

    



[facility]2018-05-06 07:07:00





             Test Item    Value        Reference Range Interpretation Comments

 

             AST (test code = AST) 15           See_Comment                [Auto

mated message] The



                                                                 system which ge

nerated this



                                                                 result transmit

huma



                                                                 reference range

: <=37. The



                                                                 reference range

 was not



                                                                 used to interpr

et this



                                                                 result as zayda

l/abnormal.



[place] 985498-05-06 07:07:00





             Test Item    Value        Reference Range Interpretation Comments

 

             ALT (test code = ALT) 28           See_Comment                [Auto

mated message] The



                                                                 system which ge

nerated this



                                                                 result transmit

huma



                                                                 reference range

: <=65. The



                                                                 reference range

 was not



                                                                 used to interpr

et this



                                                                 result as zayda

l/abnormal.



[place] 985498-05-06 07:07:00





             Test Item    Value        Reference Range Interpretation Comments

 

             Potassium Lvl (test code = Potassium 4.4          3.5-5.1          

         



             Lvl)                                                



[place] 985498-05-06 07:07:00





             Test Item    Value        Reference Range Interpretation Comments

 

             Sodium Lvl (test code = Sodium Lvl) 147          135-145           

        



[facility]2018-05-06 07:07:00





             Test Item    Value        Reference Range Interpretation Comments

 

             CO2 (test code = CO2) 25           24-32                     



[facility]2018-05-06 07:07:00





             Test Item    Value        Reference Range Interpretation Comments

 

             Chloride Lvl (test code = Chloride Lvl) 114                  

            



[place] 985498-05-06 07:07:00





             Test Item    Value        Reference Range Interpretation Comments

 

             B/C Ratio (test code = B/C Ratio) 16 1         6-25                

      



[place] 985498-05-06 07:07:00





             Test Item    Value        Reference Range Interpretation Comments

 

             Calcium Lvl (test code = Calcium Lvl) 8.7          8.5-10.5        

          



[place] 985498-05-06 07:07:00





             Test Item    Value        Reference Range Interpretation Comments

 

             AGAP (test code = AGAP) 12.4         10.0-20.0                 



[place] 985498-05-06 07:07:00





             Test Item    Value        Reference Range Interpretation Comments

 

             Total Protein (test code = Total 7.3          6.4-8.4              

     



             Protein)                                            



[facility] CenterZvtsjkoRFZVGPFMDN8837-67-24 07:07:00





             Test Item    Value        Reference Range Interpretation Comments

 

             Platelet (test code = Platelet) 345          133-450               

    



[place] 228218-05-06 07:07:00





             Test Item    Value        Reference Range Interpretation Comments

 

             MPV (test code = MPV) 8.7          7.4-10.4                  



[facility] CenterOuxqjwcZYKEOWWTSV5287-37-00 [facility]07:00





             Test Item    Value        Reference Range Interpretation Comments

 

             WBC (test code = WBC) 6.9          3.7-10.4                  



[facility] CenterWogcxuoKISBPZQRBL6355-05-81 07:07:00





             Test Item    Value        Reference Range Interpretation Comments

 

             RBC (test code = RBC) 5.30         4.70-6.10                 



[place] [address]-05-06 07:07:00





             Test Item    Value        Reference Range Interpretation Comments

 

             MCHC (test code = MCHC) 32.8         32.0-36.0                 



[facility] CenterObqrxkjDOYJAAKZKY5992-25-61 07:07:00





             Test Item    Value        Reference Range Interpretation Comments

 

             MCH (test code = MCH) 27.9 pg      27.0-31.0                 



[facility] CenterPptxvmrZGHAQHMEON0754-59-67 07:07:00





             Test Item    Value        Reference Range Interpretation Comments

 

             Hgb (test code = Hgb) 14.8         14.0-18.0                 



[place] 228218-05-06 07:07:00





             Test Item    Value        Reference Range Interpretation Comments

 

             MCV (test code = MCV) 85.0         80.0-94.0                 



[facility] CenterBarhlpeBQNCINQZEC2942-84-79 07:07:00





             Test Item    Value        Reference Range Interpretation Comments

 

             Hct (test code = Hct) 45.1         42.0-54.0                 



[facility] CenterAofcyulZBCCGIDLTJ9134-51-11 07:07:00





             Test Item    Value        Reference Range Interpretation Comments

 

             RDW (test code = RDW) 17.5         11.5-14.5                 



[facility] CenterFotrcnxSPPRFQAGFQ1725-83-87 07:07:00





             Test Item    Value        Reference Range Interpretation Comments

 

             Eosinophils # (test code 0.2          See_Comment                [A

utomated message] The



             = Eosinophils #)                                        system [facility]

h generated



                                                                 this result tra

nsmitted



                                                                 reference range

: <=0.5.



                                                                 The reference r

zhen was



                                                                 not used to int

erpret



                                                                 this result as



                                                                 normal/abnormal

.



[facility] CenterVzvvgaoRJTXWFTZYR8144-64-41 07:07:00





             Test Item    Value        Reference Range Interpretation Comments

 

             Lymphocytes # (test code = Lymphocytes 2.0          1.0-5.5        

           



             #)                                                  



[facility] CenterTqdxbefBNJSBRBITT6399-46-55 07:07:00





             Test Item    Value        Reference Range Interpretation Comments

 

             Monocytes # (test code 0.7          See_Comment                [Aut

omated message] The



             = Monocytes #)                                        system which 

generated



                                                                 this result tra

nsmitted



                                                                 reference range

: <=0.8.



                                                                 The reference r

zhen was



                                                                 not used to int

erpret



                                                                 this result as



                                                                 normal/abnormal

.



[facility] CenterCgvhjtkOEWHUBXNKP8418-55-13 07:07:00





             Test Item    Value        Reference Range Interpretation Comments

 

             Eosinophils (test code = 2.4          See_Comment                [A

utomated message] The



             Eosinophils)                                        system which ge

nerated



                                                                 this result tra

nsmitted



                                                                 reference range

: <=4.0.



                                                                 The reference r

zhen was



                                                                 not used to int

erpret



                                                                 this result as



                                                                 normal/abnormal

.



[facility] CenterStauidpVEHLDBFCID7295-97-65 07:07:00





             Test Item    Value        Reference Range Interpretation Comments

 

             Basophils (test code = 0.6          See_Comment                [Aut

omated message] The



             Basophils)                                          system which ge

nerated



                                                                 this result tra

nsmitted



                                                                 reference range

: <=1.0.



                                                                 The reference r

zhen was



                                                                 not used to int

erpret



                                                                 this result as



                                                                 normal/abnormal

.



[facility] CenterNaiwnroRAJUPFMECJ6477-61-57 07:07:00





             Test Item    Value        Reference Range Interpretation Comments

 

             Segs-Bands # (test code = Segs-Bands #) 4.0          1.5-8.1       

            



[facility] CenterTwgsrjyRMYEDGOJKA6593-52-20 07:07:00





             Test Item    Value        Reference Range Interpretation Comments

 

             Monocytes (test code = Monocytes) 10.4         2.0-12.0            

      



[facility] CenterBfwmnraZUJZZWXUWJ0681-19-17 07:07:00





             Test Item    Value        Reference Range Interpretation Comments

 

             RBC Morph (test code = Normal (18 2:07 AM)                     

      



             RBC Morph)                                          



[facility] CenterTclhktkDSFQAMZWNK5616-88-83 07:07:00





             Test Item    Value        Reference Range Interpretation Comments

 

             Segs (test code = Segs) 58.1         45.0-75.0                 



[facility] CenterYyrahciHYIPFPRJMA5152-38-82 07:07:00





             Test Item    Value        Reference Range Interpretation Comments

 

             Plt Morph (test code = Normal (18 2:07 AM)                     

      



             Plt Morph)                                          



[facility]DkqwkwvUHFFFRIXSE0232[facility]39-84 [facility]





             Test Item    Value        Reference Range Interpretation Comments

 

             Lymphocytes (test code = Lymphocytes) 28.5         20.0-40.0       

          



[facility]2018-05-06 07:07:00





             Test Item    Value        Reference Range Interpretation Comments

 

             Glucose Lvl (test code = Glucose Lvl) 74           70-99           

          



[facility]2018-05-06 07:07:00





             Test Item    Value        Reference Range Interpretation Comments

 

             BUN (test code = BUN) 12           7-22                      



[place] 985498-05-06 07:07:00





             Test Item    Value        Reference Range Interpretation Comments

 

             Creatinine Lvl (test code = Creatinine 0.75         0.50-1.40      

           



             Lvl)                                                



[facility]2018-05-06 07:07:00





             Test Item    Value        Reference Range Interpretation Comments

 

             eGFR (test code = eGFR) 140                                    



[facility]2018-05-06 07:07:00





             Test Item    Value        Reference Range Interpretation Comments

 

             Albumin Lvl (test code = Albumin Lvl) 2.9          3.5-5.0         

          



[facility]2018-05-06 07:07:00





             Test Item    Value        Reference Range Interpretation Comments

 

             Globulin (test code = Globulin) 4.4          2.7-4.2               

    



[facility]2018-05-06 07:07:00





             Test Item    Value        Reference Range Interpretation Comments

 

             A/G Ratio (test code = A/G Ratio) 0.7 1        0.7-1.6             

      



[facility]2018-05-06 07:07:00





             Test Item    Value        Reference Range Interpretation Comments

 

             Bili Total (test code = Bili Total) 0.4          0.2-1.3           

        



[facility]2018-05-06 07:07:00





             Test Item    Value        Reference Range Interpretation Comments

 

             Alk Phos (test code = Alk Phos) 71                           

    



[facility]2018-05-06 07:07:00





             Test Item    Value        Reference Range Interpretation Comments

 

             AST (test code = AST) 15           See_Comment                [Auto

mated message] The



                                                                 system which ge

nerated this



                                                                 result transmit

huma



                                                                 reference range

: <=37. The



                                                                 reference range

 was not



                                                                 used to interpr

et this



                                                                 result as zayda

l/abnormal.



[facility]2018-05-06 07:07:00





             Test Item    Value        Reference Range Interpretation Comments

 

             ALT (test code = ALT) 28           See_Comment                [Auto

mated message] The



                                                                 system which ge

nerated this



                                                                 result transmit

huma



                                                                 reference range

: <=65. The



                                                                 reference range

 was not



                                                                 used to interpr

et this



                                                                 result as zayda

l/abnormal.



[place] 985498-05-06 07:07:00





             Test Item    Value        Reference Range Interpretation Comments

 

             Potassium Lvl (test code = Potassium 4.4          3.5-5.1          

         



             Lvl)                                                



[facility]2018-05-06 07:07:00





             Test Item    Value        Reference Range Interpretation Comments

 

             Sodium Lvl (test code = Sodium Lvl) 147          135-145           

        



[place] 985498-05-06 07:07:00





             Test Item    Value        Reference Range Interpretation Comments

 

             CO2 (test code = CO2) 25           24-32                     



[place] 985498-05-06 07:07:00





             Test Item    Value        Reference Range Interpretation Comments

 

             Chloride Lvl (test code = Chloride Lvl) 114                  

            



[place] 985498-05-06 07:07:00





             Test Item    Value        Reference Range Interpretation Comments

 

             B/C Ratio (test code = B/C Ratio) 16 1         6-25                

      



[place] 985498-05-06 07:07:00





             Test Item    Value        Reference Range Interpretation Comments

 

             Calcium Lvl (test code = Calcium Lvl) 8.7          8.5-10.5        

          



[place] 985498-05-06 07:07:00





             Test Item    Value        Reference Range Interpretation Comments

 

             AGAP (test code = AGAP) 12.4         10.0-20.0                 



[place] 985498-05-06 07:07:00





             Test Item    Value        Reference Range Interpretation Comments

 

             Total Protein (test code = Total 7.3          6.4-8.4              

     



             Protein)                                            



[facility] CenterJmtkazsVCTYPJDGMA5453-40-85 07:07:00





             Test Item    Value        Reference Range Interpretation Comments

 

             Platelet (test code = Platelet) 345          133-450               

    



[facility] CenterZiehrxcZESBISDTQN8065-59-33 07:07:00





             Test Item    Value        Reference Range Interpretation Comments

 

             MPV (test code = MPV) 8.7          7.4-10.4                  



[place] 228218-05-06 07:07:00





             Test Item    Value        Reference Range Interpretation Comments

 

             WBC (test code = WBC) 6.9          3.7-10.4                  



[facility] CenterNtexviiASPJEHBTPV9108-47-24 07:07:00





             Test Item    Value        Reference Range Interpretation Comments

 

             RBC (test code = RBC) 5.30         4.70-6.10                 



[facility] CenterLgakdmfNRLMCISXTY7791-94-35 07:07:00





             Test Item    Value        Reference Range Interpretation Comments

 

             MCHC (test code = MCHC) 32.8         32.0-36.0                 



[facility] CenterHkdqezcWMHJIWOUSI2286-85-40 07:07:00





             Test Item    Value        Reference Range Interpretation Comments

 

             MCH (test code = MCH) 27.9 pg      27.0-31.0                 



[facility] CenterWvikrcgBZHENQCFQW8328-52-85 07:07:00





             Test Item    Value        Reference Range Interpretation Comments

 

             Hgb (test code = Hgb) 14.8         14.0-18.0                 



[facility] CenterHrefvjvBVICZBAELZ4988-08-29 07:07:00





             Test Item    Value        Reference Range Interpretation Comments

 

             MCV (test code = MCV) 85.0         80.0-94.0                 



[facility] CenterNyjljatJYSXVQHXGB5164-37-76 07:07:00





             Test Item    Value        Reference Range Interpretation Comments

 

             Hct (test code = Hct) 45.1         42.0-54.0                 



[facility] CenterAsfmunzSRGJNUSHKK6772-84-89 07:07:00





             Test Item    Value        Reference Range Interpretation Comments

 

             RDW (test code = RDW) 17.5         11.5-14.5                 



[facility] CenterYegvvvyVJVCCICNPG6604-05-74 07:07:00





             Test Item    Value        Reference Range Interpretation Comments

 

             Eosinophils # (test code 0.2          See_Comment                [A

utomated message] The



             = Eosinophils #)                                        system whic

h generated



                                                                 this result tra

nsmitted



                                                                 reference range

: <=0.5.



                                                                 The reference r

zhen was



                                                                 not used to int

erpret



                                                                 this result as



                                                                 normal/abnormal

.



[facility] CenterDhkvprxHLWMITPUKW2930-95-98 07:07:00





             Test Item    Value        Reference Range Interpretation Comments

 

             Lymphocytes # (test code = Lymphocytes 2.0          1.0-5.5        

           



             #)                                                  



[facility] CenterDwrtxojOFBFVEHNSV6287-79-24 07:07:00





             Test Item    Value        Reference Range Interpretation Comments

 

             Monocytes # (test code 0.7          See_Comment                [Aut

omated message] The



             = Monocytes #)                                        system which 

generated



                                                                 this result tra

nsmitted



                                                                 reference range

: <=0.8.



                                                                 The reference r

zhen was



                                                                 not used to int

erpret



                                                                 this result as



                                                                 normal/abnormal

.



[facility] CenterXtrdkkbWUMHTAYMJO4655-48-40 07:07:00





             Test Item    Value        Reference Range Interpretation Comments

 

             Eosinophils (test code = 2.4          See_Comment                [A

utomated message] The



             Eosinophils)                                        system which ge

nerated



                                                                 this result tra

nsmitted



                                                                 reference range

: <=4.0.



                                                                 The reference r

zhen was



                                                                 not used to int

erpret



                                                                 this result as



                                                                 normal/abnormal

.



[facility] CenterHthkcypLFTRXCPFZU8356-59-05 07:07:00





             Test Item    Value        Reference Range Interpretation Comments

 

             Basophils (test code = 0.6          See_Comment                [Aut

omated message] The



             Basophils)                                          system which ge

nerated



                                                                 this result tra

nsmitted



                                                                 reference range

: <=1.0.



                                                                 The reference r

zhen was



                                                                 not used to int

erpret



                                                                 this result as



                                                                 normal/abnormal

.



[facility] CenterNxtlgtfWQOHDZSPHE0731-33-82 07:07:00





             Test Item    Value        Reference Range Interpretation Comments

 

             Segs-Bands # (test code = Segs-Bands #) 4.0          1.5-8.1       

            



[facility] CenterBmcmvjpKOZUPAUCNM1308-52-45 07:07:00





             Test Item    Value        Reference Range Interpretation Comments

 

             Monocytes (test code = Monocytes) 10.4         2.0-12.0            

      



[facility] CenterQcaajzlJNCHHLMMFS2345-67-26 07:07:00





             Test Item    Value        Reference Range Interpretation Comments

 

             RBC Morph (test code = Normal (18 2:07 AM)                     

      



             RBC Morph)                                          



[facility] CenterHhbebjbCMJVAUCXSZ0376-40-03 07:07:00





             Test Item    Value        Reference Range Interpretation Comments

 

             Segs (test code = Segs) 58.1         45.0-75.0                 



[facility] CenterUkivgiiEDEPJOKIRQ9822-68-05 07:07:00





             Test Item    Value        Reference Range Interpretation Comments

 

             Plt Morph (test code = Normal (18 2:07 AM)                     

      



             Plt Morph)                                          



[facility] CenterUpsflfzPGUOIVZDHS1406-72-29 07:07:00





             Test Item    Value        Reference Range Interpretation Comments

 

             Lymphocytes (test code = Lymphocytes) 28.5         20.0-40.0       

          



[facility]2018-05-06 07:07:00





             Test Item    Value        Reference Range Interpretation Comments

 

             Glucose Lvl (test code = Glucose Lvl) 74           70-99           

          



[facility]2018-05-06 07:07:00





             Test Item    Value        Reference Range Interpretation Comments

 

             BUN (test code = BUN) 12           7-22                      



[facility]2018-05-06 07:07:00





             Test Item    Value        Reference Range Interpretation Comments

 

             Creatinine Lvl (test code = Creatinine 0.75         0.50-1.40      

           



             Lvl)                                                



[place] 985498-05-06 07:07:00





             Test Item    Value        Reference Range Interpretation Comments

 

             eGFR (test code = eGFR) 140                                    



[place] 985498-05-06 07:07:00





             Test Item    Value        Reference Range Interpretation Comments

 

             Albumin Lvl (test code = Albumin Lvl) 2.9          3.5-5.0         

          



[place] 985498-05-06 07:07:00





             Test Item    Value        Reference Range Interpretation Comments

 

             Globulin (test code = Globulin) 4.4          2.7-4.2               

    



[place] [address]-05-06 07:07:00





             Test Item    Value        Reference Range Interpretation Comments

 

             A/G Ratio (test code = A/G Ratio) 0.7 1        0.7-1.6             

      



[place] [address]-05-06 07:07:00





             Test Item    Value        Reference Range Interpretation Comments

 

             Bili Total (test code = Bili Total) 0.4          0.2-1.3           

        



[place] 985498-05-06 07:07:00





             Test Item    Value        Reference Range Interpretation Comments

 

             Alk Phos (test code = Alk Phos) 71                           

    



[place] 985498-05-06 07:07:00





             Test Item    Value        Reference Range Interpretation Comments

 

             AST (test code = AST) 15           See_Comment                [Auto

mated message] The



                                                                 system which ge

nerated this



                                                                 result transmit

huma



                                                                 reference range

: <=37. The



                                                                 reference range

 was not



                                                                 used to interpr

et this



                                                                 result as zayda

l/abnormal.



[place] 985498-05-06 07:07:00





             Test Item    Value        Reference Range Interpretation Comments

 

             ALT (test code = ALT) 28           See_Comment                [Auto

mated message] The



                                                                 system which ge

nerated this



                                                                 result transmit

huma



                                                                 reference range

: <=65. The



                                                                 reference range

 was not



                                                                 used to interpr

et this



                                                                 result as zayda

l/abnormal.



[place] [id number] 07:07:00





             Test Item    Value        Reference Range Interpretation Comments

 

             Potassium Lvl (test code = Potassium 4.4          3.5-5.1          

         



             Lvl)                                                



[place] [id number] 07:07:00





             Test Item    Value        Reference Range Interpretation Comments

 

             Sodium Lvl (test code = Sodium Lvl) 147          135-145           

        



[facility]2018-05-06 07:07:00





             Test Item    Value        Reference Range Interpretation Comments

 

             CO2 (test code = CO2) 25           24-32                     



[facility]2018-05-06 07:07:00





             Test Item    Value        Reference Range Interpretation Comments

 

             Chloride Lvl (test code = Chloride Lvl) 114                  

            



[facility]2018-05-06 07:07:00





             Test Item    Value        Reference Range Interpretation Comments

 

             B/C Ratio (test code = B/C Ratio) 16 1         6-25                

      



[facility]2018-05-06 07:07:00





             Test Item    Value        Reference Range Interpretation Comments

 

             Calcium Lvl (test code = Calcium Lvl) 8.7          8.5-10.5        

          



[facility]2018-05-06 07:07:00





             Test Item    Value        Reference Range Interpretation Comments

 

             AGAP (test code = AGAP) 12.4         10.0-20.0                 



[facility]2018-05-06 07:07:00





             Test Item    Value        Reference Range Interpretation Comments

 

             Total Protein (test code = Total 7.3          6.4-8.4              

     



             Protein)                                            



[facility]XkzbjfhEZFGKTRDHV8434[facility]55[facility]





             Test Item    Value        Reference Range Interpretation Comments

 

             Platelet (test code = Platelet) 345          133-450               

    



[facility] CenterHamimzmOYCIQQALQJ7165-48-68 07:07:00





             Test Item    Value        Reference Range Interpretation Comments

 

             MPV (test code = MPV) 8.7          7.4-10.4                  



[facility] CenterOjzqcooMNMFJGCYQP4803-95-72 07:07:00





             Test Item    Value        Reference Range Interpretation Comments

 

             WBC (test code = WBC) 6.9          3.7-10.4                  



[facility] CenterSexhetzAIKIOIJAWM1605-17-05 07:07:00





             Test Item    Value        Reference Range Interpretation Comments

 

             RBC (test code = RBC) 5.30         4.70-6.10                 



[facility] CenterVymxaoqRSDFZBBEAM6558-80-94 07:07:00





             Test Item    Value        Reference Range Interpretation Comments

 

             MCHC (test code = MCHC) 32.8         32.0-36.0                 



[facility] CenterDhbuxutTIPIGCEUEG8103-75-44 07:07:00





             Test Item    Value        Reference Range Interpretation Comments

 

             MCH (test code = MCH) 27.9 pg      27.0-31.0                 



[facility] CenterAmnpoehVSWHLFOTLG4176-73-81 07:07:00





             Test Item    Value        Reference Range Interpretation Comments

 

             Hgb (test code = Hgb) 14.8         14.0-18.0                 



[facility] CenterIxyhourATQYFZMLJH6786-60-02 07:07:00





             Test Item    Value        Reference Range Interpretation Comments

 

             MCV (test code = MCV) 85.0         80.0-94.0                 



[facility] CenterCioavalLJOJJFPZFX4319-38-95 07:07:00





             Test Item    Value        Reference Range Interpretation Comments

 

             Hct (test code = Hct) 45.1         42.0-54.0                 



[facility] CenterTzjbkotMCRBBEZBEI6350-91-54 07:07:00





             Test Item    Value        Reference Range Interpretation Comments

 

             RDW (test code = RDW) 17.5         11.5-14.5                 



[facility] CenterHmnrdknJUNKFIRXWZ7329-61-58 07:07:00





             Test Item    Value        Reference Range Interpretation Comments

 

             Eosinophils # (test code 0.2          See_Comment                [A

utomated message] The



             = Eosinophils #)                                        system whic

h generated



                                                                 this result tra

nsmitted



                                                                 reference range

: <=0.5.



                                                                 The reference r

zhen was



                                                                 not used to int

erpret



                                                                 this result as



                                                                 normal/abnormal

.



[facility] CenterBrgcbryOPTTDDHWIB8353-04-68 07:07:00





             Test Item    Value        Reference Range Interpretation Comments

 

             Lymphocytes # (test code = Lymphocytes 2.0          1.0-5.5        

           



             #)                                                  



[facility] CenterGriucnmDPZZUAUQGV3472-21-82 07:07:00





             Test Item    Value        Reference Range Interpretation Comments

 

             Monocytes # (test code 0.7          See_Comment                [Aut

omated message] The



             = Monocytes #)                                        system which 

generated



                                                                 this result tra

nsmitted



                                                                 reference range

: <=0.8.



                                                                 The reference r

zhen was



                                                                 not used to int

erpret



                                                                 this result as



                                                                 normal/abnormal

.



[facility] CenterHvtbrpkAVOBHAKOTZ5525-38-35 07:07:00





             Test Item    Value        Reference Range Interpretation Comments

 

             Eosinophils (test code = 2.4          See_Comment                [A

utomated message] The



             Eosinophils)                                        system which ge

nerated



                                                                 this result tra

nsmitted



                                                                 reference range

: <=4.0.



                                                                 The reference r

zhen was



                                                                 not used to int

erpret



                                                                 this result as



                                                                 normal/abnormal

.



[place] 101[address]-05-06 07:07:00





             Test Item    Value        Reference Range Interpretation Comments

 

             Basophils (test code = 0.6          See_Comment                [Aut

omated message] The



             Basophils)                                          system which ge

nerated



                                                                 this result tra

nsmitted



                                                                 reference range

: <=1.0.



                                                                 The reference r

zhen was



                                                                 not used to int

erpret



                                                                 this result as



                                                                 normal/abnormal

.



[facility] CenterTubbrruEIDTAIBVPX0656-78-79 07:07:00





             Test Item    Value        Reference Range Interpretation Comments

 

             Segs-Bands # (test code = Segs-Bands #) 4.0          1.5-8.1       

            



[facility] CenterPrqwiekYNKRKAEQYM5808-60-02 07:07:00





             Test Item    Value        Reference Range Interpretation Comments

 

             Monocytes (test code = Monocytes) 10.4         2.0-12.0            

      



[facility] CenterPwdglnpWTKJJOUUHL2524-30-86 07:07:00





             Test Item    Value        Reference Range Interpretation Comments

 

             RBC Morph (test code = Normal (18 2:07 AM)                     

      



             RBC Morph)                                          



[facility] CenterSyvgxsoUXSOTJLRLZ3193-80-07 07:07:00





             Test Item    Value        Reference Range Interpretation Comments

 

             Segs (test code = Segs) 58.1         45.0-75.0                 



[facility] CenterVntzcxaLYZSWLVBOT4547-59-91 07:07:00





             Test Item    Value        Reference Range Interpretation Comments

 

             Plt Morph (test code = Normal (18 2:07 AM)                     

      



             Plt Morph)                                          



[facility] CenterCmmmhctDCIYUUVSAP0510-22-43 07:07:00





             Test Item    Value        Reference Range Interpretation Comments

 

             Lymphocytes (test code = Lymphocytes) 28.5         20.0-40.0       

          



[facility]2018-05-06 07:07:00





             Test Item    Value        Reference Range Interpretation Comments

 

             Glucose Lvl (test code = Glucose Lvl) 74           70-99           

          



[facility]2018-05-06 07:07:00





             Test Item    Value        Reference Range Interpretation Comments

 

             BUN (test code = BUN) 12           7-22                      



[facility]2018-05-06 07:07:00





             Test Item    Value        Reference Range Interpretation Comments

 

             Creatinine Lvl (test code = Creatinine 0.75         0.50-1.40      

           



             Lvl)                                                



[facility]2018-05-06 07:07:00





             Test Item    Value        Reference Range Interpretation Comments

 

             eGFR (test code = eGFR) 140                                    



[facility]2018-05-06 07:07:00





             Test Item    Value        Reference Range Interpretation Comments

 

             Albumin Lvl (test code = Albumin Lvl) 2.9          3.5-5.0         

          



[facility]2018-05-06 07:07:00





             Test Item    Value        Reference Range Interpretation Comments

 

             Globulin (test code = Globulin) 4.4          2.7-4.2               

    



[facility]2018-05-06 07:07:00





             Test Item    Value        Reference Range Interpretation Comments

 

             A/G Ratio (test code = A/G Ratio) 0.7 1        0.7-1.6             

      



[place] 985498-05-06 07:07:00





             Test Item    Value        Reference Range Interpretation Comments

 

             Bili Total (test code = Bili Total) 0.4          0.2-1.3           

        



[place] 985498-05-06 07:07:00





             Test Item    Value        Reference Range Interpretation Comments

 

             Alk Phos (test code = Alk Phos) 71                           

    



[place] 985498-05-06 07:07:00





             Test Item    Value        Reference Range Interpretation Comments

 

             AST (test code = AST) 15           See_Comment                [Auto

mated message] The



                                                                 system which ge

nerated this



                                                                 result transmit

huma



                                                                 reference range

: <=37. The



                                                                 reference range

 was not



                                                                 used to interpr

et this



                                                                 result as zayda

l/abnormal.



[facility]2018-05-06 07:07:00





             Test Item    Value        Reference Range Interpretation Comments

 

             ALT (test code = ALT) 28           See_Comment                [Auto

mated message] The



                                                                 system which ge

nerated this



                                                                 result transmit

huma



                                                                 reference range

: <=65. The



                                                                 reference range

 was not



                                                                 used to interpr

et this



                                                                 result as zayda

l/abnormal.



[facility]2018-05-06 07:07:00





             Test Item    Value        Reference Range Interpretation Comments

 

             Potassium Lvl (test code = Potassium 4.4          3.5-5.1          

         



             Lvl)                                                



[facility]2018-05-06 07:07:00





             Test Item    Value        Reference Range Interpretation Comments

 

             Sodium Lvl (test code = Sodium Lvl) 147          135-145           

        



[place] 985498-05-06 07:07:00





             Test Item    Value        Reference Range Interpretation Comments

 

             CO2 (test code = CO2) 25           24-32                     



[facility]2018-05-06 07:07:00





             Test Item    Value        Reference Range Interpretation Comments

 

             Chloride Lvl (test code = Chloride Lvl) 114                  

            



[place]85498-05-06 07:07:00





             Test Item    Value        Reference Range Interpretation Comments

 

             B/C Ratio (test code = B/C Ratio) 16 1         6-25                

      



[place] 985498-05-06 07:07:00





             Test Item    Value        Reference Range Interpretation Comments

 

             Calcium Lvl (test code = Calcium Lvl) 8.7          8.5-10.5        

          



[facility] [id number] 07:07:00





             Test Item    Value        Reference Range Interpretation Comments

 

             AGAP (test code = AGAP) 12.4         10.0-20.0                 



[facility] UEMEG5168-06-05 07:07:00





             Test Item    Value        Reference Range Interpretation Comments

 

             Total Protein (test code = Total 7.3          6.4-8.4              

     



             Protein)                                            



[facility] CenterXjdaybeCXUGFXVDZT1461-93-65 07:07:00





             Test Item    Value        Reference Range Interpretation Comments

 

             Platelet (test code = Platelet) 345          133-450               

    



[facility] CenterOqfqrevOWGUSWVOWZ8656-75-01 07:07:00





             Test Item    Value        Reference Range Interpretation Comments

 

             MPV (test code = MPV) 8.7          7.4-10.4                  



[facility] CenterRaslvhkIKWIIUZTYK5676-47-14 07:07:00





             Test Item    Value        Reference Range Interpretation Comments

 

             WBC (test code = WBC) 6.9          3.7-10.4                  



[facility] CenterAvncdiaVLEDCJOIVI5088-26-47 07:07:00





             Test Item    Value        Reference Range Interpretation Comments

 

             RBC (test code = RBC) 5.30         4.70-6.10                 



[facility] CenterYzjzovdQSTXHQQCQG0070-09-36 07:07:00





             Test Item    Value        Reference Range Interpretation Comments

 

             MCHC (test code = MCHC) 32.8         32.0-36.0                 



[facility] CenterCxwsrugRJXNCALWHQ8692-23-05 07:07:00





             Test Item    Value        Reference Range Interpretation Comments

 

             MCH (test code = MCH) 27.9 pg      27.0-31.0                 



[facility] CenterTbelcuvJCKZWBJIKG4451-01-48 07:07:00





             Test Item    Value        Reference Range Interpretation Comments

 

             Hgb (test code = Hgb) 14.8         14.0-18.0                 



[facility] CenterTlofcohIIVQRBEQIS5802-08-27 07:07:00





             Test Item    Value        Reference Range Interpretation Comments

 

             MCV (test code = MCV) 85.0         80.0-94.0                 



[facility] CenterUvubwotWVZMHAWTBL9649-46-25 07:07:00





             Test Item    Value        Reference Range Interpretation Comments

 

             Hct (test code = Hct) 45.1         42.0-54.0                 



[facility] CenterMdguvucCSJAZZJWMQ7912-51-00 07:07:00





             Test Item    Value        Reference Range Interpretation Comments

 

             RDW (test code = RDW) 17.5         11.5-14.5                 



[facility] CenterFagcpqjVZGZRAXBDD7921-28-94 07:07:00





             Test Item    Value        Reference Range Interpretation Comments

 

             Eosinophils # (test code 0.2          See_Comment                [A

utomated message] The



             = Eosinophils #)                                        system whic

h generated



                                                                 this result tra

nsmitted



                                                                 reference range

: <=0.5.



                                                                 The reference r

zhen was



                                                                 not used to int

erpret



                                                                 this result as



                                                                 normal/abnormal

.



[facility] CenterUbfekckGAHFZUOMKZ9850-91-17 07:07:00





             Test Item    Value        Reference Range Interpretation Comments

 

             Lymphocytes # (test code = Lymphocytes 2.0          1.0-5.5        

           



             #)                                                  



[facility] CenterDzhgkqjJQDVXASCRY4691-45-66 07:07:00





             Test Item    Value        Reference Range Interpretation Comments

 

             Monocytes # (test code 0.7          See_Comment                [Aut

omated message] The



             = Monocytes #)                                        system which 

generated



                                                                 this result tra

nsmitted



                                                                 reference range

: <=0.8.



                                                                 The reference r

zhen was



                                                                 not used to int

erpret



                                                                 this result as



                                                                 normal/abnormal

.



[facility] CenterPxetektZRKTLGIVTY4353-67-53 07:07:00





             Test Item    Value        Reference Range Interpretation Comments

 

             Eosinophils (test code = 2.4          See_Comment                [A

utomated message] The



             Eosinophils)                                        system which ge

nerated



                                                                 this result tra

nsmitted



                                                                 reference range

: <=4.0.



                                                                 The reference r

zhen was



                                                                 not used to int

erpret



                                                                 this result as



                                                                 normal/abnormal

.



[facility] CenterAlzwpvhPJWLREKAYA6698-45-97 07:07:00





             Test Item    Value        Reference Range Interpretation Comments

 

             Basophils (test code = 0.6          See_Comment                [Aut

omated message] The



             Basophils)                                          system which ge

nerated



                                                                 this result tra

nsmitted



                                                                 reference range

: <=1.0.



                                                                 The reference r

zhen was



                                                                 not used to int

erpret



                                                                 this result as



                                                                 normal/abnormal

.



[facility] CenterSywzcfbRIARSAJWHS8215-78-49 07:07:00





             Test Item    Value        Reference Range Interpretation Comments

 

             Segs-Bands # (test code = Segs-Bands #) 4.0          1.5-8.1       

            



[facility] CenterFteysuhOOVYDLHQDN9254-75-26 07:07:00





             Test Item    Value        Reference Range Interpretation Comments

 

             Monocytes (test code = Monocytes) 10.4         2.0-12.0            

      



[facility] CenterTstkutqNGDDVVGDMQ2474-99-96 07:07:00





             Test Item    Value        Reference Range Interpretation Comments

 

             RBC Morph (test code = Normal (18 2:07 AM)                     

      



             RBC Morph)                                          



[facility] CenterHlkpaexLORYHVPHKP4135-04-13 07:07:00





             Test Item    Value        Reference Range Interpretation Comments

 

             Segs (test code = Segs) 58.1         45.0-75.0                 



[facility] CenterUhjvopdNPGIISQQUB0607-14-12 07:07:00





             Test Item    Value        Reference Range Interpretation Comments

 

             Plt Morph (test code = Normal (18 2:07 AM)                     

      



             Plt Morph)                                          



[facility] CenterJegemjjWRZMRYRREY2357-06-73 07:07:00





             Test Item    Value        Reference Range Interpretation Comments

 

             Lymphocytes (test code = Lymphocytes) 28.5         20.0-40.0       

          



[facility]2018-05-06 07:07:00





             Test Item    Value        Reference Range Interpretation Comments

 

             Glucose Lvl (test code = Glucose Lvl) 74           70-99           

          



[facility]2018-05-06 07:07:00





             Test Item    Value        Reference Range Interpretation Comments

 

             BUN (test code = BUN) 12           7-22                      



[facility]2018-05-06 07:07:00





             Test Item    Value        Reference Range Interpretation Comments

 

             Creatinine Lvl (test code = Creatinine 0.75         0.50-1.40      

           



             Lvl)                                                



[facility]2018-05-06 07:07:00





             Test Item    Value        Reference Range Interpretation Comments

 

             eGFR (test code = eGFR) 140                                    



[facility]2018-05-06 07:07:00





             Test Item    Value        Reference Range Interpretation Comments

 

             Albumin Lvl (test code = Albumin Lvl) 2.9          3.5-5.0         

          



[facility]2018-05-06 07:07:00





             Test Item    Value        Reference Range Interpretation Comments

 

             Globulin (test code = Globulin) 4.4          2.7-4.2               

    



[place] [id number] 07:07:00





             Test Item    Value        Reference Range Interpretation Comments

 

             A/G Ratio (test code = A/G Ratio) 0.7 1        0.7-1.6             

      



[facility]2018-05-06 07:07:00





             Test Item    Value        Reference Range Interpretation Comments

 

             Bili Total (test code = Bili Total) 0.4          0.2-1.3           

        



[facility]2018-05-06 07:07:00





             Test Item    Value        Reference Range Interpretation Comments

 

             Alk Phos (test code = Alk Phos) 71                           

    



[facility]2018-05-06 07:07:00





             Test Item    Value        Reference Range Interpretation Comments

 

             AST (test code = AST) 15           See_Comment                [Auto

mated message] The



                                                                 system which ge

nerated this



                                                                 result transmit

huma



                                                                 reference range

: <=37. The



                                                                 reference range

 was not



                                                                 used to interpr

et this



                                                                 result as zayda

l/abnormal.



[place] 985498-05-06 07:07:00





             Test Item    Value        Reference Range Interpretation Comments

 

             ALT (test code = ALT) 28           See_Comment                [Auto

mated message] The



                                                                 system which ge

nerated this



                                                                 result transmit

uhma



                                                                 reference range

: <=65. The



                                                                 reference range

 was not



                                                                 used to interpr

et this



                                                                 result as zayda

l/abnormal.



[facility]2018-05-06 07:07:00





             Test Item    Value        Reference Range Interpretation Comments

 

             Potassium Lvl (test code = Potassium 4.4          3.5-5.1          

         



             Lvl)                                                



[facility]2018-05-06 07:07:00





             Test Item    Value        Reference Range Interpretation Comments

 

             Sodium Lvl (test code = Sodium Lvl) 147          135-145           

        



[place] 985498-05-06 07:07:00





             Test Item    Value        Reference Range Interpretation Comments

 

             CO2 (test code = CO2) 25           24-32                     



[facility]2018-05-06 07:07:00





             Test Item    Value        Reference Range Interpretation Comments

 

             Chloride Lvl (test code = Chloride Lvl) 114                  

            



[facility]2018-05-06 07:07:00





             Test Item    Value        Reference Range Interpretation Comments

 

             B/C Ratio (test code = B/C Ratio) 16 1         6-25                

      



[place] 985498-05-06 07:07:00





             Test Item    Value        Reference Range Interpretation Comments

 

             Calcium Lvl (test code = Calcium Lvl) 8.7          8.5-10.5        

          



[place] 985498-05-06 07:07:00





             Test Item    Value        Reference Range Interpretation Comments

 

             AGAP (test code = AGAP) 12.4         10.0-20.0                 



[place] 985498-05-06 07:07:00





             Test Item    Value        Reference Range Interpretation Comments

 

             Total Protein (test code = Total 7.3          6.4-8.4              

     



             Protein)                                            



[facility]BryzdafHCPQZZXVVF0274[facility]44-28 [facility]





             Test Item    Value        Reference Range Interpretation Comments

 

             Platelet (test code = Platelet) 345          133-450               

    



[facility] CenterHivdufdCEQKXLMEMW8710-28-85 07:07:00





             Test Item    Value        Reference Range Interpretation Comments

 

             MPV (test code = MPV) 8.7          7.4-10.4                  



[facility] CenterHorxtwiWBDPYQUDVR9153-34-60 07:07:00





             Test Item    Value        Reference Range Interpretation Comments

 

             WBC (test code = WBC) 6.9          3.7-10.4                  



[facility] CenterYogzzyiVAOKAUYWWW6264-35-27 07:07:00





             Test Item    Value        Reference Range Interpretation Comments

 

             RBC (test code = RBC) 5.30         4.70-6.10                 



[facility] CenterLaowqlyNPWZFZHFHE3750-03-73 07:07:00





             Test Item    Value        Reference Range Interpretation Comments

 

             MCHC (test code = MCHC) 32.8         32.0-36.0                 



[facility] CenterDuanqgmRLCNLPEQAJ7945-24-21 07:07:00





             Test Item    Value        Reference Range Interpretation Comments

 

             MCH (test code = MCH) 27.9 pg      27.0-31.0                 



[facility] CenterKitjrfkTHWWNMMHSF4757-78-93 07:07:00





             Test Item    Value        Reference Range Interpretation Comments

 

             Hgb (test code = Hgb) 14.8         14.0-18.0                 



[facility] CenterTtfwelgYQEWZIKYMU2138-33-09 07:07:00





             Test Item    Value        Reference Range Interpretation Comments

 

             MCV (test code = MCV) 85.0         80.0-94.0                 



[facility] CenterKhaopvaATPKLLFDWW1837-59-49 07:07:00





             Test Item    Value        Reference Range Interpretation Comments

 

             Hct (test code = Hct) 45.1         42.0-54.0                 



[facility] CenterUqzsldmCRQCZPJMTJ0621-54-00 07:07:00





             Test Item    Value        Reference Range Interpretation Comments

 

             RDW (test code = RDW) 17.5         11.5-14.5                 



[facility] CenterGmibcfiQEXVPGUUTA5705-75-53 07:07:00





             Test Item    Value        Reference Range Interpretation Comments

 

             Eosinophils # (test code 0.2          See_Comment                [A

utomated message] The



             = Eosinophils #)                                        system whic

h generated



                                                                 this result tra

nsmitted



                                                                 reference range

: <=0.5.



                                                                 The reference r

zhen was



                                                                 not used to int

erpret



                                                                 this result as



                                                                 normal/abnormal

.



[facility] CenterKpdgvzaPQRFAOZHJT0097-30-56 07:07:00





             Test Item    Value        Reference Range Interpretation Comments

 

             Lymphocytes # (test code = Lymphocytes 2.0          1.0-5.5        

           



             #)                                                  



[facility] CenterNkudhadMKADRCUMKL5666-60-40 07:07:00





             Test Item    Value        Reference Range Interpretation Comments

 

             Monocytes # (test code 0.7          See_Comment                [Aut

omated message] The



             = Monocytes #)                                        system which 

generated



                                                                 this result tra

nsmitted



                                                                 reference range

: <=0.8.



                                                                 The reference r

zhen was



                                                                 not used to int

erpret



                                                                 this result as



                                                                 normal/abnormal

.



[facility] CenterIdlslrsLXOMYOMSVT4653-43-49 07:07:00





             Test Item    Value        Reference Range Interpretation Comments

 

             Eosinophils (test code = 2.4          See_Comment                [A

utomated message] The



             Eosinophils)                                        system which ge

nerated



                                                                 this result tra

nsmitted



                                                                 reference range

: <=4.0.



                                                                 The reference r

zhen was



                                                                 not used to int

erpret



                                                                 this result as



                                                                 normal/abnormal

.



[facility] CenterGeunbrmGZDZVITLFG2460-64-91 07:07:00





             Test Item    Value        Reference Range Interpretation Comments

 

             Basophils (test code = 0.6          See_Comment                [Aut

omated message] The



             Basophils)                                          system which ge

nerated



                                                                 this result tra

nsmitted



                                                                 reference range

: <=1.0.



                                                                 The reference r

zhen was



                                                                 not used to int

erpret



                                                                 this result as



                                                                 normal/abnormal

.



[facility] CenterNiebyjmCRFKJBPHRG1006-38-61 07:07:00





             Test Item    Value        Reference Range Interpretation Comments

 

             Segs-Bands # (test code = Segs-Bands #) 4.0          1.5-8.1       

            



[facility] CenterNwetkeoLVIVWLZCGS4933-91-95 07:07:00





             Test Item    Value        Reference Range Interpretation Comments

 

             Monocytes (test code = Monocytes) 10.4         2.0-12.0            

      



[facility] CenterWdeqzsfUKFCDMWIDV8894-01-69 07:07:00





             Test Item    Value        Reference Range Interpretation Comments

 

             RBC Morph (test code = Normal (18 2:07 AM)                     

      



             RBC Morph)                                          



[facility] CenterGseasvkYUXQYFPWVQ6193-44-26 07:07:00





             Test Item    Value        Reference Range Interpretation Comments

 

             Segs (test code = Segs) 58.1         45.0-75.0                 



[facility] CenterTjrivtoABCZFBYMMY2268-52-82 07:07:00





             Test Item    Value        Reference Range Interpretation Comments

 

             Plt Morph (test code = Normal (18 2:07 AM)                     

      



             Plt Morph)                                          



[facility] CenterTnbhfeeRNRSCUWVJM7918-54-58 07:07:00





             Test Item    Value        Reference Range Interpretation Comments

 

             Lymphocytes (test code = Lymphocytes) 28.5         20.0-40.0       

          



[facility] CenterDkpjbwfQMBCERBQVR1222-60-51 16:07:00





             Test Item    Value        Reference Range Interpretation Comments

 

             Basophils # (test code 0.1          See_Comment                [Aut

omated message] The



             = Basophils #)                                        system which 

generated



                                                                 this result tra

nsmitted



                                                                 reference range

: <=0.2.



                                                                 The reference r

zhen was



                                                                 not used to int

erpret



                                                                 this result as



                                                                 normal/abnormal

.



[facility] CenterCtwfnlrETZLJSGHOI8603-28-61 16:07:00





             Test Item    Value        Reference Range Interpretation Comments

 

             Polychrom (test code = Moderate *ABN*(18                       

    



             Polychrom)   11:07 AM)                              



[facility] CenterKvicjyvHFRRAKFBVZ8117-52-89 16:07:00





             Test Item    Value        Reference Range Interpretation Comments

 

             Basophils # (test code 0.1          See_Comment                [Aut

omated message] The



             = Basophils #)                                        system which 

generated



                                                                 this result tra

nsmitted



                                                                 reference range

: <=0.2.



                                                                 The reference r

zhen was



                                                                 not used to int

erpret



                                                                 this result as



                                                                 normal/abnormal

.



[facility] CenterNaoimtoDHOVNAWHDA4626-52-46 16:07:00





             Test Item    Value        Reference Range Interpretation Comments

 

             Polychrom (test code = Moderate *ABN*(18                       

    



             Polychrom)   11:07 AM)                              



[facility] CenterKbkipozQSNXFTORWT4479-89-06 16:07:00





             Test Item    Value        Reference Range Interpretation Comments

 

             Basophils # (test code 0.1          See_Comment                [Aut

omated message] The



             = Basophils #)                                        system which 

generated



                                                                 this result tra

nsmitted



                                                                 reference range

: <=0.2.



                                                                 The reference r

zhen was



                                                                 not used to int

erpret



                                                                 this result as



                                                                 normal/abnormal

.



[facility] CenterNgqomzoESCBPDAPMC8314-80-51 16:07:00





             Test Item    Value        Reference Range Interpretation Comments

 

             Polychrom (test code = Moderate *ABN*(18                       

    



             Polychrom)   11:07 AM)                              



[facility] CenterYhieqboGQBXWDSRYX6407-67-80 16:07:00





             Test Item    Value        Reference Range Interpretation Comments

 

             Basophils # (test code 0.1          See_Comment                [Aut

omated message] The



             = Basophils #)                                        system which 

generated



                                                                 this result tra

nsmitted



                                                                 reference range

: <=0.2.



                                                                 The reference r

zhen was



                                                                 not used to int

erpret



                                                                 this result as



                                                                 normal/abnormal

.



[facility] CenterWsarokvQKLDOMXJOF5072-16-29 16:07:00





             Test Item    Value        Reference Range Interpretation Comments

 

             Polychrom (test code = Moderate *ABN*(18                       

    



             Polychrom)   11:07 AM)                              



[facility] CenterBlryjmqBTPVCMGURZ2221-75-47 16:07:00





             Test Item    Value        Reference Range Interpretation Comments

 

             Basophils # (test code 0.1          See_Comment                [Aut

omated message] The



             = Basophils #)                                        system which 

generated



                                                                 this result tra

nsmitted



                                                                 reference range

: <=0.2.



                                                                 The reference r

zhen was



                                                                 not used to int

erpret



                                                                 this result as



                                                                 normal/abnormal

.



[facility] CenterWrsdtwqZNZNHMKHDU8827-23-38 16:07:00





             Test Item    Value        Reference Range Interpretation Comments

 

             Polychrom (test code = Moderate *ABN*(18                       

    



             Polychrom)   11:07 AM)                              



[facility] CenterYfqyowlUYAIDMTFWQ1797-85-12 16:07:00





             Test Item    Value        Reference Range Interpretation Comments

 

             Basophils # (test code 0.1          See_Comment                [Aut

omated message] The



             = Basophils #)                                        system which 

generated



                                                                 this result tra

nsmitted



                                                                 reference range

: <=0.2.



                                                                 The reference r

zhen was



                                                                 not used to int

erpret



                                                                 this result as



                                                                 normal/abnormal

.



[facility] CenterMqzoexpKEOVHPWTOB9697-69-30 16:07:00





             Test Item    Value        Reference Range Interpretation Comments

 

             Polychrom (test code = Moderate *ABN*(18                       

    



             Polychrom)   11:07 AM)                              



[facility] CenterYuynvmgCZSRDIZGBI0973-68-20 16:07:00





             Test Item    Value        Reference Range Interpretation Comments

 

             Basophils # (test code 0.1          See_Comment                [Aut

omated message] The



             = Basophils #)                                        system which 

generated



                                                                 this result tra

nsmitted



                                                                 reference range

: <=0.2.



                                                                 The reference r

zhen was



                                                                 not used to int

erpret



                                                                 this result as



                                                                 normal/abnormal

.



[facility] CenterLdysdjoPWIIYJFGDM5710-78-64 16:07:00





             Test Item    Value        Reference Range Interpretation Comments

 

             Polychrom (test code = Moderate *ABN*(18                       

    



             Polychrom)   11:07 AM)                              



[facility] CenterVwofsiqMGYBUXTIBN1776-97-85 16:07:00





             Test Item    Value        Reference Range Interpretation Comments

 

             Basophils # (test code 0.1          See_Comment                [Aut

omated message] The



             = Basophils #)                                        system which 

generated



                                                                 this result tra

nsmitted



                                                                 reference range

: <=0.2.



                                                                 The reference r

zhen was



                                                                 not used to int

erpret



                                                                 this result as



                                                                 normal/abnormal

.



[facility] CenterSnbhkwrWESNAOIFZV2986-91-64 16:07:00





             Test Item    Value        Reference Range Interpretation Comments

 

             Polychrom (test code = Moderate *ABN*(18                       

    



             Polychrom)   11:07 AM)                              



[facility] NzyyohiMBUEDZAXNG2587-13-49 06:43:00





             Test Item    Value        Reference Range Interpretation Comments

 

             Vanco Tr TND (test code = Vanco Tr TND) *                          

            



[facility] KxpahtkMUSIUBXOLQ3795-75-17 06:43:00





             Test Item    Value        Reference Range Interpretation Comments

 

             Vanco Tr (test code = Vanco Tr) 22.3                               

    



[facility] DeonxawNJPVWVHZFY4070-77-53 06:43:00





             Test Item    Value        Reference Range Interpretation Comments

 

             Vanco Tr TND (test code = Vanco Tr TND) *                          

            



[facility] DbotexsKKVESCFBYZ8741-27-89 06:43:00





             Test Item    Value        Reference Range Interpretation Comments

 

             Vanco Tr (test code = Vanco Tr) 22.3                               

    



[facility] WnzyyuvOFCTJLKQIE3267-39-66 06:43:00





             Test Item    Value        Reference Range Interpretation Comments

 

             Vanco Tr TND (test code = Vanco Tr TND) *                          

            



[facility] ElbvnsqZPOOGFYFHH3322-48-68 06:43:00





             Test Item    Value        Reference Range Interpretation Comments

 

             Vanco Tr (test code = Vanco Tr) 22.3                               

    



[facility] MzlxuwqLDWBLRESTM7918-38-98 06:43:00





             Test Item    Value        Reference Range Interpretation Comments

 

             Vanco Tr TND (test code = Vanco Tr TND) *                          

            



[facility] GrfjsadXRZJXBJTAZ9886-06-36 06:43:00





             Test Item    Value        Reference Range Interpretation Comments

 

             Vanco Tr (test code = Vanco Tr) 22.3                               

    



[facility] ZaductdFCVSVWXDKV3183-72-34 06:43:00





             Test Item    Value        Reference Range Interpretation Comments

 

             Vanco Tr TND (test code = Vanco Tr TND) *                          

            



[facility] TjaovncHVPHKAVKDG3430-43-02 06:43:00





             Test Item    Value        Reference Range Interpretation Comments

 

             Vanco Tr (test code = Vanco Tr) 22.3                               

    



[facility] RhxoandCVCXAJOANQ7124-61-87 06:43:00





             Test Item    Value        Reference Range Interpretation Comments

 

             Vanco Tr TND (test code = Vanco Tr TND) *                          

            



[facility] QatystrGUDYIGMIWB8584-17-36 06:43:00





             Test Item    Value        Reference Range Interpretation Comments

 

             Vanco Tr (test code = Vanco Tr) 22.3                               

    



[facility] YkknisiTFHBGMIMDE6989-32-02 06:43:00





             Test Item    Value        Reference Range Interpretation Comments

 

             Vanco Tr TND (test code = Vanco Tr TND) *                          

            



[facility] OxcjbvjXJOQWRHSNO5431-51-96 06:43:00





             Test Item    Value        Reference Range Interpretation Comments

 

             Vanco Tr (test code = Vanco Tr) 22.3                               

    



[facility] VfrsssmUFSXVKCIAY3679-25-12 06:43:00





             Test Item    Value        Reference Range Interpretation Comments

 

             Vanco Tr TND (test code = Vanco Tr TND) *                          

            



[facility] QgccqxeXTOMJCYMOY2068-74-02 06:43:00





             Test Item    Value        Reference Range Interpretation Comments

 

             Vanco Tr (test code = Vanco Tr) 22.3                               

    



[facility]2018-05-04 11:45:00





             Test Item    Value        Reference Range Interpretation Comments

 

             Magnesium Lvl (test code = Magnesium 2.3          1.8-2.4          

         



             Lvl)                                                



[facility] VQDHZ7251-84-55 11:45:00





             Test Item    Value        Reference Range Interpretation Comments

 

             Phosphorus (test code = Phosphorus) 3.5          2.5-4.5           

        



[facility] DallasVnesnweXXBSDICGMA9916-82-11 11:45:00





             Test Item    Value        Reference Range Interpretation Comments

 

             PT (test code = PT) 14.0 s       12.0-14.7                 



[facility]00





             Test Item    Value        Reference Range Interpretation Comments

 

             PTT (test code = PTT) 37.6 s       22.9-35.8                 



[facility] DallasSjewpkfEVLOIRDMLV8497-43-00 11:45:00





             Test Item    Value        Reference Range Interpretation Comments

 

             INR (test code = INR) 1.08 1       0.85-1.17                 



[facility]





             Test Item    Value        Reference Range Interpretation Comments

 

             C-REACTIVE PROTEIN (test code = 13.1                               

    



             C-REACTIVE PROTEIN)                                        



[facility] DallasUaergepZPEQMHQVQK6451-44-26 11:45:00





             Test Item    Value        Reference Range Interpretation Comments

 

             Prealbumin (test code = Prealbumin) 25.2         18.0-45.0         

        



[facility] QGGDD4617-92-19 11:45:00





             Test Item    Value        Reference Range Interpretation Comments

 

             Magnesium Lvl (test code = Magnesium 2.3          1.8-2.4          

         



             Lvl)                                                



[facility]2018-05-04 11:45:00





             Test Item    Value        Reference Range Interpretation Comments

 

             Phosphorus (test code = Phosphorus) 3.5          2.5-4.5           

        



[facility]WwlxtkfXEBTJUSSQB4547-39-82 11:45:00





             Test Item    Value        Reference Range Interpretation Comments

 

             PT (test code = PT) 14.0 s       12.0-14.7                 



[facility] CenterAlnbqpoDLIIJKDIRN8698-02-80 11:45:00





             Test Item    Value        Reference Range Interpretation Comments

 

             PTT (test code = PTT) 37.6 s       22.9-35.8                 



[facility] CenterZrfgcjmHXQWQCCIDM8690-75-34 11:45:00





             Test Item    Value        Reference Range Interpretation Comments

 

             INR (test code = INR) 1.08 1       0.85-1.17                 



[facility] CenterCwykfypWRBWXKWBLA6951-86-62 11:45:00





             Test Item    Value        Reference Range Interpretation Comments

 

             C-REACTIVE PROTEIN (test code = 13.1                               

    



             C-REACTIVE PROTEIN)                                        



[facility] CenterEgngsixRUYCBUAOZL3965-05-96 11:45:00





             Test Item    Value        Reference Range Interpretation Comments

 

             Prealbumin (test code = Prealbumin) 25.2         18.0-45.0         

        



[facility]2018-05-04 11:45:00





             Test Item    Value        Reference Range Interpretation Comments

 

             Magnesium Lvl (test code = Magnesium 2.3          1.8-2.4          

         



             Lvl)                                                



[facility]2018-05-04 11:45:00





             Test Item    Value        Reference Range Interpretation Comments

 

             Phosphorus (test code = Phosphorus) 3.5          2.5-4.5           

        



[facility] CenterImtnddwLQPAYNWZAZ7339-94-49 11:45:00





             Test Item    Value        Reference Range Interpretation Comments

 

             PT (test code = PT) 14.0 s       12.0-14.7                 



[facility] CenterDhoxgkpHYPJYWBEXT4965-89-39 11:45:00





             Test Item    Value        Reference Range Interpretation Comments

 

             PTT (test code = PTT) 37.6 s       22.9-35.8                 



[facility] CenterUhytnwsHUBFVLSADM1023-08-22 11:45:00





             Test Item    Value        Reference Range Interpretation Comments

 

             INR (test code = INR) 1.08 1       0.85-1.17                 



[facility] CenterPhqiwguWVMWWOZCMW5799-51-07 11:45:00





             Test Item    Value        Reference Range Interpretation Comments

 

             C-REACTIVE PROTEIN (test code = 13.1                               

    



             C-REACTIVE PROTEIN)                                        



[facility] CenterKcdtgneLKRRWJKAQH9569-84-62 11:45:00





             Test Item    Value        Reference Range Interpretation Comments

 

             Prealbumin (test code = Prealbumin) 25.2         18.0-45.0         

        



[facility]2018-05-04 11:45:00





             Test Item    Value        Reference Range Interpretation Comments

 

             Magnesium Lvl (test code = Magnesium 2.3          1.8-2.4          

         



             Lvl)                                                



[facility]2018-05-04 11:45:00





             Test Item    Value        Reference Range Interpretation Comments

 

             Phosphorus (test code = Phosphorus) 3.5          2.5-4.5           

        



[facility] CenterBpgvhtyTVRRGPSKHV3997-62-64 11:45:00





             Test Item    Value        Reference Range Interpretation Comments

 

             PT (test code = PT) 14.0 s       12.0-14.7                 



[facility] CenterCcwwpstTJVWZWLMMX1210-40-00 11:45:00





             Test Item    Value        Reference Range Interpretation Comments

 

             PTT (test code = PTT) 37.6 s       22.9-35.8                 



[facility] CenterHcidyygWWXUGIDDAD5543-38-43 11:45:00





             Test Item    Value        Reference Range Interpretation Comments

 

             INR (test code = INR) 1.08 1       0.85-1.17                 



[facility] CenterKkqpkrbCHUYMWYJHW5480-22-48 11:45:00





             Test Item    Value        Reference Range Interpretation Comments

 

             C-REACTIVE PROTEIN (test code = 13.1                               

    



             C-REACTIVE PROTEIN)                                        



[facility] CenterYyvnzbyMDNHSJQKUY1991-35-25 11:45:00





             Test Item    Value        Reference Range Interpretation Comments

 

             Prealbumin (test code = Prealbumin) 25.2         18.0-45.0         

        



[facility]2018-05-04 11:45:00





             Test Item    Value        Reference Range Interpretation Comments

 

             Magnesium Lvl (test code = Magnesium 2.3          1.8-2.4          

         



             Lvl)                                                



[facility]2018-05-04 11:45:00





             Test Item    Value        Reference Range Interpretation Comments

 

             Phosphorus (test code = Phosphorus) 3.5          2.5-4.5           

        



[facility] CenterIfjfsurDSQVCXILLV8249-05-07 11:45:00





             Test Item    Value        Reference Range Interpretation Comments

 

             PT (test code = PT) 14.0 s       12.0-14.7                 



[facility] CenterUovsgbbRUMRHVZQZM7053-48-72 11:45:00





             Test Item    Value        Reference Range Interpretation Comments

 

             PTT (test code = PTT) 37.6 s       22.9-35.8                 



[facility] CenterLtydpaiOAANRWGRKJ3085-65-16 11:45:00





             Test Item    Value        Reference Range Interpretation Comments

 

             INR (test code = INR) 1.08 1       0.85-1.17                 



[facility] CenterZlwwtsoIQILDMTNQU1391-12-01 11:45:00





             Test Item    Value        Reference Range Interpretation Comments

 

             C-REACTIVE PROTEIN (test code = 13.1                               

    



             C-REACTIVE PROTEIN)                                        



[place] [address]-05-04 11:45:00





             Test Item    Value        Reference Range Interpretation Comments

 

             Prealbumin (test code = Prealbumin) 25.2         18.0-45.0         

        



[facility]2018-05-04 11:45:00





             Test Item    Value        Reference Range Interpretation Comments

 

             Magnesium Lvl (test code = Magnesium 2.3          1.8-2.4          

         



             Lvl)                                                



[facility]2018-05-04 11:45:00





             Test Item    Value        Reference Range Interpretation Comments

 

             Phosphorus (test code = Phosphorus) 3.5          2.5-4.5           

        



[facility] CenterCoraufqVQPXWLAJOJ5649-69-82 11:45:00





             Test Item    Value        Reference Range Interpretation Comments

 

             PT (test code = PT) 14.0 s       12.0-14.7                 



[facility] CenterNpmqzueKZOQXREYTP6676-90-45 11:45:00





             Test Item    Value        Reference Range Interpretation Comments

 

             PTT (test code = PTT) 37.6 s       22.9-35.8                 



[facility] CenterExsfynuMBZJLAJOCF1654-44-50 11:45:00





             Test Item    Value        Reference Range Interpretation Comments

 

             INR (test code = INR) 1.08 1       0.85-1.17                 



[facility] CenterUvdknmxOYIRYZGIRC6190-72-15 11:45:00





             Test Item    Value        Reference Range Interpretation Comments

 

             C-REACTIVE PROTEIN (test code = 13.1                               

    



             C-REACTIVE PROTEIN)                                        



[facility] CenterTqxunnmMUBTFCESHW3553-19-11 11:45:00





             Test Item    Value        Reference Range Interpretation Comments

 

             Prealbumin (test code = Prealbumin) 25.2         18.0-45.0         

        



[facility]2018-05-04 11:45:00





             Test Item    Value        Reference Range Interpretation Comments

 

             Magnesium Lvl (test code = Magnesium 2.3          1.8-2.4          

         



             Lvl)                                                



[facility]2018-05-04 11:45:00





             Test Item    Value        Reference Range Interpretation Comments

 

             Phosphorus (test code = Phosphorus) 3.5          2.5-4.5           

        



[facility] CenterRsknonxAHQSHWASOC4530-86-33 11:45:00





             Test Item    Value        Reference Range Interpretation Comments

 

             PT (test code = PT) 14.0 s       12.0-14.7                 



[facility] CenterTifbbxxATAEEWYLZF9502-72-48 11:45:00





             Test Item    Value        Reference Range Interpretation Comments

 

             PTT (test code = PTT) 37.6 s       22.9-35.8                 



[facility] CenterVkonxazNHCKBZKNQC4129-16-74 11:45:00





             Test Item    Value        Reference Range Interpretation Comments

 

             INR (test code = INR) 1.08 1       0.85-1.17                 



[facility] CenterVhzjulaWBXEYVPIWH5729-35-84 11:45:00





             Test Item    Value        Reference Range Interpretation Comments

 

             C-REACTIVE PROTEIN (test code = 13.1                               

    



             C-REACTIVE PROTEIN)                                        



[facility] CenterSliwtzkPHVQSVWLPD8666-74-09 11:45:00





             Test Item    Value        Reference Range Interpretation Comments

 

             Prealbumin (test code = Prealbumin) 25.2         18.0-45.0         

        



[facility]2018-05-04 11:45:00





             Test Item    Value        Reference Range Interpretation Comments

 

             Magnesium Lvl (test code = Magnesium 2.3          1.8-2.4          

         



             Lvl)                                                



[facility]2018-05-04 11:45:00





             Test Item    Value        Reference Range Interpretation Comments

 

             Phosphorus (test code = Phosphorus) 3.5          2.5-4.5           

        



[facility] CenterHmqoumhKVRIXBUSDY9988-93-14 11:45:00





             Test Item    Value        Reference Range Interpretation Comments

 

             PT (test code = PT) 14.0 s       12.0-14.7                 



[facility] CenterXbjdtstGAQPFBDYOX1417-67-47 11:45:00





             Test Item    Value        Reference Range Interpretation Comments

 

             PTT (test code = PTT) 37.6 s       22.9-35.8                 



[facility] CenterSnthdkdHCGSIEDDFW1280-55-68 11:45:00





             Test Item    Value        Reference Range Interpretation Comments

 

             INR (test code = INR) 1.08 1       0.85-1.17                 



[facility] CenterCkyhsfqHXVGRKZXKD8883-85-78 11:45:00





             Test Item    Value        Reference Range Interpretation Comments

 

             C-REACTIVE PROTEIN (test code = 13.1                               

    



             C-REACTIVE PROTEIN)                                        



[facility] CenterQtfxsbqOFORBKXSGZ9867-13-03 11:45:00





             Test Item    Value        Reference Range Interpretation Comments

 

             Prealbumin (test code = Prealbumin) 25.2         18.0-45.0         

        



[facility]2018-05-04 03:06:00





             Test Item    Value        Reference Range Interpretation Comments

 

             Lactic Acid Lvl (test code = Lactic 0.9          0.5-2.2           

        



             Acid Lvl)                                           



[facility] CenterBvjqzrtZBWYCNKQXY6571-20-60 03:06:00





             Test Item    Value        Reference Range Interpretation Comments

 

             Sed Rate (test code = 5            See_Comment                [Auto

mated message] The



             Sed Rate)                                           system which ge

nerated this



                                                                 result transmit

huma



                                                                 reference range

: <=15. The



                                                                 reference range

 was not



                                                                 used to interpr

et this



                                                                 result as zayda

l/abnormal.



[place] [address]-05-04 03:06:00





             Test Item    Value        Reference Range Interpretation Comments

 

             C-REACTIVE PROTEIN (test code = 15.8                               

    



             C-REACTIVE PROTEIN)                                        



[facility]2018-05-04 03:06:00





             Test Item    Value        Reference Range Interpretation Comments

 

             Lactic Acid Lvl (test code = Lactic 0.9          0.5-2.2           

        



             Acid Lvl)                                           



[facility] CenterSjqydppNXVIVUHTCH3865-28-91 03:06:00





             Test Item    Value        Reference Range Interpretation Comments

 

             Sed Rate (test code = 5            See_Comment                [Auto

mated message] The



             Sed Rate)                                           system which ge

nerated this



                                                                 result transmit

huma



                                                                 reference range

: <=15. The



                                                                 reference range

 was not



                                                                 used to interpr

et this



                                                                 result as zayda

l/abnormal.



[place] [address]-05-04 03:06:00





             Test Item    Value        Reference Range Interpretation Comments

 

             C-REACTIVE PROTEIN (test code = 15.8                               

    



             C-REACTIVE PROTEIN)                                        



[facility]2018-05-04 03:06:00





             Test Item    Value        Reference Range Interpretation Comments

 

             Lactic Acid Lvl (test code = Lactic 0.9          0.5-2.2           

        



             Acid Lvl)                                           



[facility] CenterGuoqzwcJMDOILHZOP3641-79-33 03:06:00





             Test Item    Value        Reference Range Interpretation Comments

 

             Sed Rate (test code = 5            See_Comment                [Auto

mated message] The



             Sed Rate)                                           system which ge

nerated this



                                                                 result transmit

huma



                                                                 reference range

: <=15. The



                                                                 reference range

 was not



                                                                 used to interpr

et this



                                                                 result as zayda

l/abnormal.



[place] [address]-05-04 03:06:00





             Test Item    Value        Reference Range Interpretation Comments

 

             C-REACTIVE PROTEIN (test code = 15.8                               

    



             C-REACTIVE PROTEIN)                                        



[facility]2018-05-04 03:06:00





             Test Item    Value        Reference Range Interpretation Comments

 

             Lactic Acid Lvl (test code = Lactic 0.9          0.5-2.2           

        



             Acid Lvl)                                           



[facility] CenterOhmtyvuJPGGJJWENO7996-95-50 03:06:00





             Test Item    Value        Reference Range Interpretation Comments

 

             Sed Rate (test code = 5            See_Comment                [Auto

mated message] The



             Sed Rate)                                           system which ge

nerated this



                                                                 result transmit

huma



                                                                 reference range

: <=15. The



                                                                 reference range

 was not



                                                                 used to interpr

et this



                                                                 result as zayda

l/abnormal.



[facility] CenterDdntcalBKMGEQUGJB8903-96-12 03:06:00





             Test Item    Value        Reference Range Interpretation Comments

 

             C-REACTIVE PROTEIN (test code = 15.8                               

    



             C-REACTIVE PROTEIN)                                        



[facility]2018-05-04 03:06:00





             Test Item    Value        Reference Range Interpretation Comments

 

             Lactic Acid Lvl (test code = Lactic 0.9          0.5-2.2           

        



             Acid Lvl)                                           



[facility] CenterMurshzqPONWWVWEJF3191-24-91 03:06:00





             Test Item    Value        Reference Range Interpretation Comments

 

             Sed Rate (test code = 5            See_Comment                [Auto

mated message] The



             Sed Rate)                                           system which ge

nerated this



                                                                 result transmit

huma



                                                                 reference range

: <=15. The



                                                                 reference range

 was not



                                                                 used to interpr

et this



                                                                 result as zayda

l/abnormal.



[facility] CenterVwamtinVVRATAAGME9812-75-77 03:06:00





             Test Item    Value        Reference Range Interpretation Comments

 

             C-REACTIVE PROTEIN (test code = 15.8                               

    



             C-REACTIVE PROTEIN)                                        



[facility]2018-05-04 03:06:00





             Test Item    Value        Reference Range Interpretation Comments

 

             Lactic Acid Lvl (test code = Lactic 0.9          0.5-2.2           

        



             Acid Lvl)                                           



[facility] CenterRgpfaigJMDXTBKYPH3785-16-14 03:06:00





             Test Item    Value        Reference Range Interpretation Comments

 

             Sed Rate (test code = 5            See_Comment                [Auto

mated message] The



             Sed Rate)                                           system which ge

nerated this



                                                                 result transmit

huma



                                                                 reference range

: <=15. The



                                                                 reference range

 was not



                                                                 used to interpr

et this



                                                                 result as zayda

l/abnormal.



[facility] [id number] 03:06:00





             Test Item    Value        Reference Range Interpretation Comments

 

             C-REACTIVE PROTEIN (test code = 15.8                               

    



             C-REACTIVE PROTEIN)                                        



[facility]2018-05-04 03:06:00





             Test Item    Value        Reference Range Interpretation Comments

 

             Lactic Acid Lvl (test code = Lactic 0.9          0.5-2.2           

        



             Acid Lvl)                                           



[facility] [id number] 03:06:00





             Test Item    Value        Reference Range Interpretation Comments

 

             Sed Rate (test code = 5            See_Comment                [Auto

mated message] The



             Sed Rate)                                           system which ge

nerated this



                                                                 result transmit

huma



                                                                 reference range

: <=15. The



                                                                 reference range

 was not



                                                                 used to interpr

et this



                                                                 result as zayda

l/abnormal.



[facility] C[id number] 03:06:00





             Test Item    Value        Reference Range Interpretation Comments

 

             C-REACTIVE PROTEIN (test code = 15.8                               

    



             C-REACTIVE PROTEIN)                                        



[facility]2018-05-04 03:06:00





             Test Item    Value        Reference Range Interpretation Comments

 

             Lactic Acid Lvl (test code = Lactic 0.9          0.5-2.2           

        



             Acid Lvl)                                           



[facility] CenterVewyuurQQWUIRXOAP5568-71-39 03:06:00





             Test Item    Value        Reference Range Interpretation Comments

 

             Sed Rate (test code = 5            See_Comment                [Auto

mated message] The



             Sed Rate)                                           system which ge

nerated this



                                                                 result transmit

huma



                                                                 reference range

: <=15. The



                                                                 reference range

 was not



                                                                 used to interpr

et this



                                                                 result as zayda

l/abnormal.



[facility] CenterUfkhfoiJSMLRDKDHO6205-99-58 03:06:00





             Test Item    Value        Reference Range Interpretation Comments

 

             C-REACTIVE PROTEIN (test code = 15.8                               

    



             C-REACTIVE PROTEIN)                                        



[facility] [id number] 00:44:00





             Test Item    Value        Reference Range Interpretation Comments

 

             PT (test code = PT) 13.0 s       12.0-14.7                 



[facility] CenterXloroqrMMIEAOAIQV5405-80-87 00:44:00





             Test Item    Value        Reference Range Interpretation Comments

 

             INR (test code = INR) 0.98 1       0.85-1.17                 



[facility] CenterBxdmvslOTVKZZHWJO6208-22-39 00:44:00





             Test Item    Value        Reference Range Interpretation Comments

 

             PTT (test code = PTT) 33.2 s       22.9-35.8                 



[facility] CenterEktbhkpJQNXCUPGGO3096-87-69 00:44:00





             Test Item    Value        Reference Range Interpretation Comments

 

             PT (test code = PT) 13.0 s       12.0-14.7                 



[facility] CenterYwepjccYHNKSXDNQW3337-00-52 00:44:00





             Test Item    Value        Reference Range Interpretation Comments

 

             INR (test code = INR) 0.98 1       0.85-1.17                 



[place] [id number] 00:44:00





             Test Item    Value        Reference Range Interpretation Comments

 

             PTT (test code = PTT) 33.2 s       22.9-35.8                 



[facility] CenterRevtemdDVOIVAOMNC1516-72-33 00:44:00





             Test Item    Value        Reference Range Interpretation Comments

 

             PT (test code = PT) 13.0 s       12.0-14.7                 



[facility] CenterGmqmghyWBYINFRDEI1286-84-32 00:44:00





             Test Item    Value        Reference Range Interpretation Comments

 

             INR (test code = INR) 0.98 1       0.85-1.17                 



[place] 10189-05-04 00:44:00





             Test Item    Value        Reference Range Interpretation Comments

 

             PTT (test code = PTT) 33.2 s       22.9-35.8                 



[facility] CenterTxvvonyTWVEHBYZQX6299-35-30 00:44:00





             Test Item    Value        Reference Range Interpretation Comments

 

             PT (test code = PT) 13.0 s       12.0-14.7                 



[facility] CenterUirjftkHPPEMPKNTR4486-97-24 00:44:00





             Test Item    Value        Reference Range Interpretation Comments

 

             INR (test code = INR) 0.98 1       0.85-1.17                 



[facility] CenterTyfjlksDUGBYJJDWQ8970-57-66 00:44:00





             Test Item    Value        Reference Range Interpretation Comments

 

             PTT (test code = PTT) 33.2 s       22.9-35.8                 



[facility] CenterRmlvuvzSUGAYASNSO1500-45-08 00:44:00





             Test Item    Value        Reference Range Interpretation Comments

 

             PT (test code = PT) 13.0 s       12.0-14.7                 



[facility] CenterJvyiopbANREDYTUGA2630-27-35 00:44:00





             Test Item    Value        Reference Range Interpretation Comments

 

             INR (test code = INR) 0.98 1       0.85-1.17                 



[facility] CenterTbkfqujASMUCXDZHM4247-04-76 00:44:00





             Test Item    Value        Reference Range Interpretation Comments

 

             PTT (test code = PTT) 33.2 s       22.9-35.8                 



[facility] CenterQxtjnncYFYHVUYVDL0709-47-35 00:44:00





             Test Item    Value        Reference Range Interpretation Comments

 

             PT (test code = PT) 13.0 s       12.0-14.7                 



[facility] CenterRnhleyhHDLVKARZXN8346-70-66 00:44:00





             Test Item    Value        Reference Range Interpretation Comments

 

             INR (test code = INR) 0.98 1       0.85-1.17                 



[place] 10189-05-04 00:44:00





             Test Item    Value        Reference Range Interpretation Comments

 

             PTT (test code = PTT) 33.2 s       22.9-35.8                 



[facility] CenterLlcpembQYDZETUWBA0217-97-19 00:44:00





             Test Item    Value        Reference Range Interpretation Comments

 

             PT (test code = PT) 13.0 s       12.0-14.7                 



[facility] CenterOrchnotCJFRXCKIHB8191-59-71 00:44:00





             Test Item    Value        Reference Range Interpretation Comments

 

             INR (test code = INR) 0.98 1       0.85-1.17                 



[facility] CenterMyydwriNEMOHZDWNW7836-78-31 00:44:00





             Test Item    Value        Reference Range Interpretation Comments

 

             PTT (test code = PTT) 33.2 s       22.9-35.8                 



[facility] CenterQbutbybMUZKTVVRQO2476-32-77 00:44:00





             Test Item    Value        Reference Range Interpretation Comments

 

             PT (test code = PT) 13.0 s       12.0-14.7                 



[facility] CenterPqputgqOZWKIZTRDW9349-85-34 00:44:00





             Test Item    Value        Reference Range Interpretation Comments

 

             INR (test code = INR) 0.98 1       0.85-1.17                 



[facility] CenterIpxnayoIQMBWNCNDT0636-75-25 00:44:00





             Test Item    Value        Reference Range Interpretation Comments

 

             PTT (test code = PTT) 33.2 s       22.9-35.8                 



[facility]  23[facility]





             Test Item    Value        Reference Range Interpretation Comments

 

             Antibody Scrn (test Negative (5/3/18 6:51                          

 



             code = Antibody Scrn) PM)                                    



[facility]  23[facility]





             Test Item    Value        Reference Range Interpretation Comments

 

             ABO/Rh (test code = ABO/Rh) AB POS                                 



[facility] [employer] [facility] [id number] 23:51:00





             Test Item    Value        Reference Range Interpretation Comments

 

             Antibody Scrn (test Negative (5/3/18 6:51                          

 



             code = Antibody Scrn) PM)                                    



[facility] BANK  23:51:00





             Test Item    Value        Reference Range Interpretation Comments

 

             ABO/Rh (test code = ABO/Rh) AB POS                                 



[facility] [employer] [id number] 23:51:00





             Test Item    Value        Reference Range Interpretation Comments

 

             Antibody Scrn (test Negative (5/3/18 6:51                          

 



             code = Antibody Scrn) PM)                                    



[facility] [id number] 23:51:00





             Test Item    Value        Reference Range Interpretation Comments

 

             ABO/Rh (test code = ABO/Rh) AB POS                                 



[facility] [id number] 23:51:00





             Test Item    Value        Reference Range Interpretation Comments

 

             Antibody Scrn (test Negative (5/3/18 6:51                          

 



             code = Antibody Scrn) PM)                                    



[facility] [id number] 23:51:00





             Test Item    Value        Reference Range Interpretation Comments

 

             ABO/Rh (test code = ABO/Rh) AB POS                                 



[facility] [id number] 23:51:00





             Test Item    Value        Reference Range Interpretation Comments

 

             Antibody Scrn (test Negative (5/3/18 6:51                          

 



             code = Antibody Scrn) PM)                                    



[facility] [id number] 23:51:00





             Test Item    Value        Reference Range Interpretation Comments

 

             ABO/Rh (test code = ABO/Rh) AB POS                                 



[facility] [employer] B[facility] [id number] 23:51:00





             Test Item    Value        Reference Range Interpretation Comments

 

             Antibody Scrn (test Negative (5/3/18 6:51                          

 



             code = Antibody Scrn) PM)                                    



[facility] [id number] 23:51:00





             Test Item    Value        Reference Range Interpretation Comments

 

             ABO/Rh (test code = ABO/Rh) AB POS                                 



[facility] [id number] 23:51:00





             Test Item    Value        Reference Range Interpretation Comments

 

             Antibody Scrn (test Negative (5/3/18 6:51                          

 



             code = Antibody Scrn) PM)                                    



[facility] [id number] 23:51:00





             Test Item    Value        Reference Range Interpretation Comments

 

             ABO/Rh (test code = ABO/Rh) AB POS                                 



[facility] [id number] 23:51:00





             Test Item    Value        Reference Range Interpretation Comments

 

             Antibody Scrn (test Negative (5/3/18 6:51                          

 



             code = Antibody Scrn) PM)                                    



[facility] [id number] 23:51:00





             Test Item    Value        Reference Range Interpretation Comments

 

             ABO/Rh (test code = ABO/Rh) AB POS                                 



[facility] AND [id number] 23:35:00





             Test Item    Value        Reference Range Interpretation Comments

 

             UA Urobilinogen (test code = UA 0.2          0.1-1.0               

    



             Urobilinogen)                                        



[facility] AND [id number] 23:35:00





             Test Item    Value        Reference Range Interpretation Comments

 

             UA Nitrite (test code Negative (5/3/18 6:35                        

   



             = UA Nitrite) PM)                                    



[facility] AND [id number] 23:35:00





             Test Item    Value        Reference Range Interpretation Comments

 

             UA Glucose (test code Negative (5/3/18 6:35                        

   



             = UA Glucose) PM)                                    



[facility] AND [id number] 23:35:00





             Test Item    Value        Reference Range Interpretation Comments

 

             UA Ketones (test code Negative *NA*(5/3/18                         

  



             = UA Ketones) 6:35 PM)                               



[facility] AND [id number] 23:35:00





             Test Item    Value        Reference Range Interpretation Comments

 

             UA Bili (test code = Negative *NA*(5/3/18                          

 



             UA Bili)     6:35 PM)                               



[facility] AND [id number] 23:35:00





             Test Item    Value        Reference Range Interpretation Comments

 

             UA Blood (test code = Trace *ABN*(5/3/18                           



             UA Blood)    6:35 PM)                               



[facility] AND [id number] 23:35:00





             Test Item    Value        Reference Range Interpretation Comments

 

             UA Leuk Est (test code Small *ABN*(5/3/18 6:35                     

      



             = UA Leuk Est) PM)                                    



[facility] AND [id number] 23:35:00





             Test Item    Value        Reference Range Interpretation Comments

 

             UA Spec Grav (test code = UA Spec 1.020 1                          

      



             Grav)                                               



[facility] AND [id number] 23:35:00





             Test Item    Value        Reference Range Interpretation Comments

 

             UA pH (test code = UA pH) 6.0 1        5.0-8.0                   



[facility] AND [id number] 23:35:00





             Test Item    Value        Reference Range Interpretation Comments

 

             UA Color (test code = Yellow *NA*(5/3/18 6:35                      

     



             UA Color)    PM)                                    



[facility] AND [id number] 23:35:00





             Test Item    Value        Reference Range Interpretation Comments

 

             UA Protein (test code = Trace *ABN*(5/3/18                         

  



             UA Protein)  6:35 PM)                               



[facility] AND [id number] 23:35:00





             Test Item    Value        Reference Range Interpretation Comments

 

             UA Turbidity (test code Slight Cloudy (5/3/18                      

     



             = UA Turbidity) 6:35 PM)                               



[facility] AND [id number] 23:35:00





             Test Item    Value        Reference Range Interpretation Comments

 

             UA Hyal Cast 0-2 (5/3/18 6:35 See_Comment                [Automated

 message]



             (test code = UA PM)                                    The system w

[facility]



             Hyal Cast)                                          generated this 

result



                                                                 transmitted ref

erence



                                                                 range: <=2. The



                                                                 reference range

 was



                                                                 not used to int

erpret



                                                                 this result as



                                                                 normal/abnormal

.



[facility] AND [id number] 23:35:00





             Test Item    Value        Reference Range Interpretation Comments

 

             UA Bacteria (test code = UA Occasional /HPF                        

   



             Bacteria)                                           



[facility] AND [id number] 23:35:00





             Test Item    Value        Reference Range Interpretation Comments

 

             UA RBC (test code 11-20 /HPF   See_Comment                [Automate

d message] The



             = UA RBC)                                           system which ge

nerated



                                                                 this result tra

nsmitted



                                                                 reference range

: <=2. The



                                                                 reference range

 was not



                                                                 used to interpr

et this



                                                                 result as



                                                                 normal/abnormal

.



[facility] AND [id number] 23:35:00





             Test Item    Value        Reference Range Interpretation Comments

 

             UA Sq Epi (test code = UA Sq Occasional /LPF                       

    



             Epi)                                                



[facility] AND [id number] 23:35:00





             Test Item    Value        Reference Range Interpretation Comments

 

             UA WBC (test code = UA WBC)  /HPF                            



[facility] AND [id number] 23:35:00





             Test Item    Value        Reference Range Interpretation Comments

 

             UA Urobilinogen (test code = UA 0.2          0.1-1.0               

    



             Urobilinogen)                                        



[facility] AND [id number] 23:35:00





             Test Item    Value        Reference Range Interpretation Comments

 

             UA Nitrite (test code Negative (5/3/18 6:35                        

   



             = UA Nitrite) PM)                                    



[facility] AND [id number] 23:35:00





             Test Item    Value        Reference Range Interpretation Comments

 

             UA Glucose (test code Negative (5/3/18 6:35                        

   



             = UA Glucose) PM)                                    



[facility] AND [id number] 23:35:00





             Test Item    Value        Reference Range Interpretation Comments

 

             UA Ketones (test code Negative *NA*(5/3/18                         

  



             = UA Ketones) 6:35 PM)                               



[facility] AND [id number] 23:35:00





             Test Item    Value        Reference Range Interpretation Comments

 

             UA Bili (test code = Negative *NA*(5/3/18                          

 



             UA Bili)     6:35 PM)                               



[facility] AND [id number] 23:35:00





             Test Item    Value        Reference Range Interpretation Comments

 

             UA Blood (test code = Trace *ABN*(5/3/18                           



             UA Blood)    6:35 PM)                               



[facility] AND [id number] 23:35:00





             Test Item    Value        Reference Range Interpretation Comments

 

             UA Leuk Est (test code Small *ABN*(5/3/18 6:35                     

      



             = UA Leuk Est) PM)                                    



[facility] AND [id number] 23:35:00





             Test Item    Value        Reference Range Interpretation Comments

 

             UA Spec Grav (test code = UA Spec 1.020 1                          

      



             Grav)                                               



[facility] AND [id number] 23:35:00





             Test Item    Value        Reference Range Interpretation Comments

 

             UA pH (test code = UA pH) 6.0 1        5.0-8.0                   



[facility] AND [id number] 23:35:00





             Test Item    Value        Reference Range Interpretation Comments

 

             UA Color (test code = Yellow *NA*(5/3/18 6:35                      

     



             UA Color)    PM)                                    



[facility] AND [id number] 23:35:00





             Test Item    Value        Reference Range Interpretation Comments

 

             UA Protein (test code = Trace *ABN*(5/3/18                         

  



             UA Protein)  6:35 PM)                               



[facility] AND [id number] 23:35:00





             Test Item    Value        Reference Range Interpretation Comments

 

             UA Turbidity (test code Slight Cloudy (5/3/18                      

     



             = UA Turbidity) 6:35 PM)                               



[facility] AND [id number] 23:35:00





             Test Item    Value        Reference Range Interpretation Comments

 

             UA Hyal Cast 0-2 (5/3/18 6:35 See_Comment                [Automated

 message]



             (test code = UA PM)                                    The system w

[facility]



             Hyal Cast)                                          generated this 

result



                                                                 transmitted ref

erence



                                                                 range: <=2. The



                                                                 reference range

 was



                                                                 not used to int

erpret



                                                                 this result as



                                                                 normal/abnormal

.



[facility] AND [id number] 23:35:00





             Test Item    Value        Reference Range Interpretation Comments

 

             UA Bacteria (test code = UA Occasional /HPF                        

   



             Bacteria)                                           



[facility] AND [id number] 23:35:00





             Test Item    Value        Reference Range Interpretation Comments

 

             UA RBC (test code 11-20 /HPF   See_Comment                [Automate

d message] The



             = UA RBC)                                           system which ge

nerated



                                                                 this result tra

nsmitted



                                                                 reference range

: <=2. The



                                                                 reference range

 was not



                                                                 used to interpr

et this



                                                                 result as



                                                                 normal/abnormal

.



[facility] AND [id number] 23:35:00





             Test Item    Value        Reference Range Interpretation Comments

 

             UA Sq Epi (test code = UA Sq Occasional /LPF                       

    



             Epi)                                                



[facility] AND [id number] 23:35:00





             Test Item    Value        Reference Range Interpretation Comments

 

             UA WBC (test code = UA WBC)  /HPF                            



[facility] AND [id number] 23:35:00





             Test Item    Value        Reference Range Interpretation Comments

 

             UA Urobilinogen (test code = UA 0.2          0.1-1.0               

    



             Urobilinogen)                                        



[facility] AND [id number] 23:35:00





             Test Item    Value        Reference Range Interpretation Comments

 

             UA Nitrite (test code Negative (5/3/18 6:35                        

   



             = UA Nitrite) PM)                                    



[facility] AND [id number] 23:35:00





             Test Item    Value        Reference Range Interpretation Comments

 

             UA Glucose (test code Negative (5/3/18 6:35                        

   



             = UA Glucose) PM)                                    



[facility] AND [id number] 23:35:00





             Test Item    Value        Reference Range Interpretation Comments

 

             UA Ketones (test code Negative *NA*(5/3/18                         

  



             = UA Ketones) 6:35 PM)                               



[facility] AND [id number] 23:35:00





             Test Item    Value        Reference Range Interpretation Comments

 

             UA Bili (test code = Negative *NA*(5/3/18                          

 



             UA Bili)     6:35 PM)                               



[facility] AND [id number] 23:35:00





             Test Item    Value        Reference Range Interpretation Comments

 

             UA Blood (test code = Trace *ABN*(5/3/18                           



             UA Blood)    6:35 PM)                               



[facility] AND [id number] 23:35:00





             Test Item    Value        Reference Range Interpretation Comments

 

             UA Leuk Est (test code Small *ABN*(5/3/18 6:35                     

      



             = UA Leuk Est) PM)                                    



[facility] [id number] 23:35:00





             Test Item    Value        Reference Range Interpretation Comments

 

             UA Spec Grav (test code = UA Spec 1.020 1                          

      



             Grav)                                               



[facility] AND [id number] 23:35:00





             Test Item    Value        Reference Range Interpretation Comments

 

             UA pH (test code = UA pH) 6.0 1        5.0-8.0                   



[facility] [id number] 23:35:00





             Test Item    Value        Reference Range Interpretation Comments

 

             UA Color (test code = Yellow *NA*(5/3/18 6:35                      

     



             UA Color)    PM)                                    



[facility] AND [id number] 23:35:00





             Test Item    Value        Reference Range Interpretation Comments

 

             UA Protein (test code = Trace *ABN*(5/3/18                         

  



             UA Protein)  6:35 PM)                               



[facility] AND [id number] 23:35:00





             Test Item    Value        Reference Range Interpretation Comments

 

             UA Turbidity (test code Slight Cloudy (5/3/18                      

     



             = UA Turbidity) 6:35 PM)                               



[facility] AND [id number] 23:35:00





             Test Item    Value        Reference Range Interpretation Comments

 

             UA Hyal Cast 0-2 (5/3/18 6:35 See_Comment                [Automated

 message]



             (test code = UA PM)                                    The system w

michelle



             Hyal Cast)                                          generated this 

result



                                                                 transmitted ref

erence



                                                                 range: <=2. The



                                                                 reference range

 was



                                                                 not used to int

erpret



                                                                 this result as



                                                                 normal/abnormal

.



[facility] AND [id number] 23:35:00





             Test Item    Value        Reference Range Interpretation Comments

 

             UA Bacteria (test code = UA Occasional /HPF                        

   



             Bacteria)                                           



[facility] AND [id number] 23:35:00





             Test Item    Value        Reference Range Interpretation Comments

 

             UA RBC (test code 11-20 /HPF   See_Comment                [Automate

d message] The



             = UA RBC)                                           system which ge

nerated



                                                                 this result tra

nsmitted



                                                                 reference range

: <=2. The



                                                                 reference range

 was not



                                                                 used to interpr

et this



                                                                 result as



                                                                 normal/abnormal

.



[facility] [id number] 23:35:00





             Test Item    Value        Reference Range Interpretation Comments

 

             UA Sq Epi (test code = UA Sq Occasional /LPF                       

    



             Epi)                                                



[facility] AND [id number] 23:35:00





             Test Item    Value        Reference Range Interpretation Comments

 

             UA WBC (test code = UA WBC)  /HPF                            



[facility] AND [id number] 23:35:00





             Test Item    Value        Reference Range Interpretation Comments

 

             UA Urobilinogen (test code = UA 0.2          0.1-1.0               

    



             Urobilinogen)                                        



[facility] AND  23:35:00





             Test Item    Value        Reference Range Interpretation Comments

 

             UA Nitrite (test code Negative (5/3/18 6:35                        

   



             = UA Nitrite) PM)                                    



[facility] AND [id number] 23:35:00





             Test Item    Value        Reference Range Interpretation Comments

 

             UA Glucose (test code Negative (5/3/18 6:35                        

   



             = UA Glucose) PM)                                    



[facility] AND [id number] 23:35:00





             Test Item    Value        Reference Range Interpretation Comments

 

             UA Ketones (test code Negative *NA*(5/3/18                         

  



             = UA Ketones) 6:35 PM)                               



[facility] AND [id number] 23:35:00





             Test Item    Value        Reference Range Interpretation Comments

 

             UA Bili (test code = Negative *NA*(5/3/18                          

 



             UA Bili)     6:35 PM)                               



[facility] AND [id number] 23:35:00





             Test Item    Value        Reference Range Interpretation Comments

 

             UA Blood (test code = Trace *ABN*(5/3/18                           



             UA Blood)    6:35 PM)                               



[facility] AND [id number] 23:35:00





             Test Item    Value        Reference Range Interpretation Comments

 

             UA Leuk Est (test code Small *ABN*(5/3/18 6:35                     

      



             = UA Leuk Est) PM)                                    



[facility] AND [id number] 23:35:00





             Test Item    Value        Reference Range Interpretation Comments

 

             UA Spec Grav (test code = UA Spec 1.020 1                          

      



             Grav)                                               



[facility] AND [id number] 23:35:00





             Test Item    Value        Reference Range Interpretation Comments

 

             UA pH (test code = UA pH) 6.0 1        5.0-8.0                   



[facility] AND [id number] 23:35:00





             Test Item    Value        Reference Range Interpretation Comments

 

             UA Color (test code = Yellow *NA*(5/3/18 6:35                      

     



             UA Color)    PM)                                    



[facility] AND [id number] 23:35:00





             Test Item    Value        Reference Range Interpretation Comments

 

             UA Protein (test code = Trace *ABN*(5/3/18                         

  



             UA Protein)  6:35 PM)                               



[facility] AND [id number] 23:35:00





             Test Item    Value        Reference Range Interpretation Comments

 

             UA Turbidity (test code Slight Cloudy (5/3/18                      

     



             = UA Turbidity) 6:35 PM)                               



[facility] AND [id number] 23:35:00





             Test Item    Value        Reference Range Interpretation Comments

 

             UA Hyal Cast 0-2 (5/3/18 6:35 See_Comment                [Automated

 message]



             (test code = UA PM)                                    The system w

[facility]



             Hyal Cast)                                          generated this 

result



                                                                 transmitted ref

erence



                                                                 range: <=2. The



                                                                 reference range

 was



                                                                 not used to int

erpret



                                                                 this result as



                                                                 normal/abnormal

.



[facility] AND [id number] 23:35:00





             Test Item    Value        Reference Range Interpretation Comments

 

             UA Bacteria (test code = UA Occasional /HPF                        

   



             Bacteria)                                           



[facility] AND [id number] 23:35:00





             Test Item    Value        Reference Range Interpretation Comments

 

             UA RBC (test code 11-20 /HPF   See_Comment                [Automate

d message] The



             = UA RBC)                                           system which ge

nerated



                                                                 this result tra

nsmitted



                                                                 reference range

: <=2. The



                                                                 reference range

 was not



                                                                 used to interpr

et this



                                                                 result as



                                                                 normal/abnormal

.



[facility] AND [id number] 23:35:00





             Test Item    Value        Reference Range Interpretation Comments

 

             UA Sq Epi (test code = UA Sq Occasional /LPF                       

    



             Epi)                                                



[facility] AND [id number] 23:35:00





             Test Item    Value        Reference Range Interpretation Comments

 

             UA WBC (test code = UA WBC)  /HPF                            



[facility] AND [id number] 23:35:00





             Test Item    Value        Reference Range Interpretation Comments

 

             UA Urobilinogen (test code = UA 0.2          0.1-1.0               

    



             Urobilinogen)                                        



[facility] AND [id number] 23:35:00





             Test Item    Value        Reference Range Interpretation Comments

 

             UA Nitrite (test code Negative (5/3/18 6:35                        

   



             = UA Nitrite) PM)                                    



[facility] AND [id number] 23:35:00





             Test Item    Value        Reference Range Interpretation Comments

 

             UA Glucose (test code Negative (5/3/18 6:35                        

   



             = UA Glucose) PM)                                    



[facility] AND [id number] 23:35:00





             Test Item    Value        Reference Range Interpretation Comments

 

             UA Ketones (test code Negative *NA*(5/3/18                         

  



             = UA Ketones) 6:35 PM)                               



[facility] AND [id number] 23:35:00





             Test Item    Value        Reference Range Interpretation Comments

 

             UA Bili (test code = Negative *NA*(5/3/18                          

 



             UA Bili)     6:35 PM)                               



[facility] AND [id number] 23:35:00





             Test Item    Value        Reference Range Interpretation Comments

 

             UA Blood (test code = Trace *ABN*(5/3/18                           



             UA Blood)    6:35 PM)                               



[facility] AND [id number] 23:35:00





             Test Item    Value        Reference Range Interpretation Comments

 

             UA Leuk Est (test code Small *ABN*(5/3/18 6:35                     

      



             = UA Leuk Est) PM)                                    



[facility] AND  23:35:00





             Test Item    Value        Reference Range Interpretation Comments

 

             UA Spec Grav (test code = UA Spec 1.020 1                          

      



             Grav)                                               



[facility] AND [id number] 23:35:00





             Test Item    Value        Reference Range Interpretation Comments

 

             UA pH (test code = UA pH) 6.0 1        5.0-8.0                   



[facility] AND [id number] 23:35:00





             Test Item    Value        Reference Range Interpretation Comments

 

             UA Color (test code = Yellow *NA*(5/3/18 6:35                      

     



             UA Color)    PM)                                    



[facility] AND [id number] 23:35:00





             Test Item    Value        Reference Range Interpretation Comments

 

             UA Protein (test code = Trace *ABN*(5/3/18                         

  



             UA Protein)  6:35 PM)                               



[facility] AND [id number] 23:35:00





             Test Item    Value        Reference Range Interpretation Comments

 

             UA Turbidity (test code Slight Cloudy (5/3/18                      

     



             = UA Turbidity) 6:35 PM)                               



[facility] AND [id number] 23:35:00





             Test Item    Value        Reference Range Interpretation Comments

 

             UA Hyal Cast 0-2 (5/3/18 6:35 See_Comment                [Automated

 message]



             (test code = UA PM)                                    The system w

michelle



             Hyal Cast)                                          generated this 

result



                                                                 transmitted ref

erence



                                                                 range: <=2. The



                                                                 reference range

 was



                                                                 not used to int

erpret



                                                                 this result as



                                                                 normal/abnormal

.



[facility] AND [id number] 23:35:00





             Test Item    Value        Reference Range Interpretation Comments

 

             UA Bacteria (test code = UA Occasional /HPF                        

   



             Bacteria)                                           



[facility] AND [id number] 23:35:00





             Test Item    Value        Reference Range Interpretation Comments

 

             UA RBC (test code 11-20 /HPF   See_Comment                [Automate

d message] The



             = UA RBC)                                           system which ge

nerated



                                                                 this result tra

nsmitted



                                                                 reference range

: <=2. The



                                                                 reference range

 was not



                                                                 used to interpr

et this



                                                                 result as



                                                                 normal/abnormal

.



[facility] AND [id number] 23:35:00





             Test Item    Value        Reference Range Interpretation Comments

 

             UA Sq Epi (test code = UA Sq Occasional /LPF                       

    



             Epi)                                                



[facility] AND [id number] 23:35:00





             Test Item    Value        Reference Range Interpretation Comments

 

             UA WBC (test code = UA WBC)  /HPF                            



[facility] AND [id number] 23:35:00





             Test Item    Value        Reference Range Interpretation Comments

 

             UA Urobilinogen (test code = UA 0.2          0.1-1.0               

    



             Urobilinogen)                                        



[facility] AND [id number] 23:35:00





             Test Item    Value        Reference Range Interpretation Comments

 

             UA Nitrite (test code Negative (5/3/18 6:35                        

   



             = UA Nitrite) PM)                                    



[facility] AND [id number] 23:35:00





             Test Item    Value        Reference Range Interpretation Comments

 

             UA Glucose (test code Negative (5/3/18 6:35                        

   



             = UA Glucose) PM)                                    



[facility] AND [id number] 23:35:00





             Test Item    Value        Reference Range Interpretation Comments

 

             UA Ketones (test code Negative *NA*(5/3/18                         

  



             = UA Ketones) 6:35 PM)                               



[facility] AND [id number] 23:35:00





             Test Item    Value        Reference Range Interpretation Comments

 

             UA Bili (test code = Negative *NA*(5/3/18                          

 



             UA Bili)     6:35 PM)                               



[facility] AND [id number] 23:35:00





             Test Item    Value        Reference Range Interpretation Comments

 

             UA Blood (test code = Trace *ABN*(5/3/18                           



             UA Blood)    6:35 PM)                               



[facility] [id number] 23:35:00





             Test Item    Value        Reference Range Interpretation Comments

 

             UA Leuk Est (test code Small *ABN*(5/3/18 6:35                     

      



             = UA Leuk Est) PM)                                    



[facility] [id number] 23:35:00





             Test Item    Value        Reference Range Interpretation Comments

 

             UA Spec Grav (test code = UA Spec 1.020 1                          

      



             Grav)                                               



[facility] AND [id number] 23:35:00





             Test Item    Value        Reference Range Interpretation Comments

 

             UA pH (test code = UA pH) 6.0 1        5.0-8.0                   



[facility] [id number] 23:35:00





             Test Item    Value        Reference Range Interpretation Comments

 

             UA Color (test code = Yellow *NA*(5/3/18 6:35                      

     



             UA Color)    PM)                                    



[facility] [id number] 23:35:00





             Test Item    Value        Reference Range Interpretation Comments

 

             UA Protein (test code = Trace *ABN*(5/3/18                         

  



             UA Protein)  6:35 PM)                               



[facility] AND [id number] 23:35:00





             Test Item    Value        Reference Range Interpretation Comments

 

             UA Turbidity (test code Slight Cloudy (5/3/18                      

     



             = UA Turbidity) 6:35 PM)                               



[facility] [id number] 23:35:00





             Test Item    Value        Reference Range Interpretation Comments

 

             UA Hyal Cast 0-2 (5/3/18 6:35 See_Comment                [Automated

 message]



             (test code = UA PM)                                    The system w

[facility]



             Hyal Cast)                                          generated this 

result



                                                                 transmitted ref

erence



                                                                 range: <=2. The



                                                                 reference range

 was



                                                                 not used to int

erpret



                                                                 this result as



                                                                 normal/abnormal

.



[facility] AND [id number] 23:35:00





             Test Item    Value        Reference Range Interpretation Comments

 

             UA Bacteria (test code = UA Occasional /HPF                        

   



             Bacteria)                                           



[facility] [id number] 23:35:00





             Test Item    Value        Reference Range Interpretation Comments

 

             UA RBC (test code 11-20 /HPF   See_Comment                [Automate

d message] The



             = UA RBC)                                           system which ge

nerated



                                                                 this result tra

nsmitted



                                                                 reference range

: <=2. The



                                                                 reference range

 was not



                                                                 used to interpr

et this



                                                                 result as



                                                                 normal/abnormal

.



[facility] [id number] 23:35:00





             Test Item    Value        Reference Range Interpretation Comments

 

             UA Sq Epi (test code = UA Sq Occasional /LPF                       

    



             Epi)                                                



[facility] AND [id number] 23:35:00





             Test Item    Value        Reference Range Interpretation Comments

 

             UA WBC (test code = UA WBC)  /HPF                            



[facility] AND [id number] 23:35:00





             Test Item    Value        Reference Range Interpretation Comments

 

             UA Urobilinogen (test code = UA 0.2          0.1-1.0               

    



             Urobilinogen)                                        



[facility] AND [id number] 23:35:00





             Test Item    Value        Reference Range Interpretation Comments

 

             UA Nitrite (test code Negative (5/3/18 6:35                        

   



             = UA Nitrite) PM)                                    



[facility] AND [id number] 23:35:00





             Test Item    Value        Reference Range Interpretation Comments

 

             UA Glucose (test code Negative (5/3/18 6:35                        

   



             = UA Glucose) PM)                                    



[facility] AND [id number] 23:35:00





             Test Item    Value        Reference Range Interpretation Comments

 

             UA Ketones (test code Negative *NA*(5/3/18                         

  



             = UA Ketones) 6:35 PM)                               



[facility] AND [id number] 23:35:00





             Test Item    Value        Reference Range Interpretation Comments

 

             UA Bili (test code = Negative *NA*(5/3/18                          

 



             UA Bili)     6:35 PM)                               



[facility] AND [id number] 23:35:00





             Test Item    Value        Reference Range Interpretation Comments

 

             UA Blood (test code = Trace *ABN*(5/3/18                           



             UA Blood)    6:35 PM)                               



[facility] AND [id number] 23:35:00





             Test Item    Value        Reference Range Interpretation Comments

 

             UA Leuk Est (test code Small *ABN*(5/3/18 6:35                     

      



             = UA Leuk Est) PM)                                    



[facility] AND [id number] 23:35:00





             Test Item    Value        Reference Range Interpretation Comments

 

             UA Spec Grav (test code = UA Spec 1.020 1                          

      



             Grav)                                               



[facility] AND [id number] 23:35:00





             Test Item    Value        Reference Range Interpretation Comments

 

             UA pH (test code = UA pH) 6.0 1        5.0-8.0                   



[facility] AND [id number] 23:35:00





             Test Item    Value        Reference Range Interpretation Comments

 

             UA Color (test code = Yellow *NA*(5/3/18 6:35                      

     



             UA Color)    PM)                                    



[facility] AND [id number] 23:35:00





             Test Item    Value        Reference Range Interpretation Comments

 

             UA Protein (test code = Trace *ABN*(5/3/18                         

  



             UA Protein)  6:35 PM)                               



[facility] AND [id number] 23:35:00





             Test Item    Value        Reference Range Interpretation Comments

 

             UA Turbidity (test code Slight Cloudy (5/3/18                      

     



             = UA Turbidity) 6:35 PM)                               



[facility] AND [id number] 23:35:00





             Test Item    Value        Reference Range Interpretation Comments

 

             UA Hyal Cast 0-2 (5/3/18 6:35 See_Comment                [Automated

 message]



             (test code = UA PM)                                    The system w

[facility]



             Hyal Cast)                                          generated this 

result



                                                                 transmitted ref

erence



                                                                 range: <=2. The



                                                                 reference range

 was



                                                                 not used to int

erpret



                                                                 this result as



                                                                 normal/abnormal

.



[facility] AND [id number] 23:35:00





             Test Item    Value        Reference Range Interpretation Comments

 

             UA Bacteria (test code = UA Occasional /HPF                        

   



             Bacteria)                                           



[facility] AND [id number] 23:35:00





             Test Item    Value        Reference Range Interpretation Comments

 

             UA RBC (test code 11-20 /HPF   See_Comment                [Automate

d message] The



             = UA RBC)                                           system which ge

nerated



                                                                 this result tra

nsmitted



                                                                 reference range

: <=2. The



                                                                 reference range

 was not



                                                                 used to interpr

et this



                                                                 result as



                                                                 normal/abnormal

.



[facility] AND [id number] 23:35:00





             Test Item    Value        Reference Range Interpretation Comments

 

             UA Sq Epi (test code = UA Sq Occasional /LPF                       

    



             Epi)                                                



[facility] AND [id number] 23:35:00





             Test Item    Value        Reference Range Interpretation Comments

 

             UA WBC (test code = UA WBC)  /HPF                            



[facility] AND [id number] 23:35:00





             Test Item    Value        Reference Range Interpretation Comments

 

             UA Urobilinogen (test code = UA 0.2          0.1-1.0               

    



             Urobilinogen)                                        



[facility] AND [id number] 23:35:00





             Test Item    Value        Reference Range Interpretation Comments

 

             UA Nitrite (test code Negative (5/3/18 6:35                        

   



             = UA Nitrite) PM)                                    



[facility] AND  23:35:00





             Test Item    Value        Reference Range Interpretation Comments

 

             UA Glucose (test code Negative (5/3/18 6:35                        

   



             = UA Glucose) PM)                                    



[facility] AND  23:35:00





             Test Item    Value        Reference Range Interpretation Comments

 

             UA Ketones (test code Negative *NA*(5/3/18                         

  



             = UA Ketones) 6:35 PM)                               



[facility] AND  23:35:00





             Test Item    Value        Reference Range Interpretation Comments

 

             UA Bili (test code = Negative *NA*(5/3/18                          

 



             UA Bili)     6:35 PM)                               



[facility] AND  23:35:00





             Test Item    Value        Reference Range Interpretation Comments

 

             UA Blood (test code = Trace *ABN*(5/3/18                           



             UA Blood)    6:35 PM)                               



[facility] AND  23:35:00





             Test Item    Value        Reference Range Interpretation Comments

 

             UA Leuk Est (test code Small *ABN*(5/3/18 6:35                     

      



             = UA Leuk Est) PM)                                    



[facility] AND  23:35:00





             Test Item    Value        Reference Range Interpretation Comments

 

             UA Spec Grav (test code = UA Spec 1.020 1                          

      



             Grav)                                               



[facility] AND [id number] 23:35:00





             Test Item    Value        Reference Range Interpretation Comments

 

             UA pH (test code = UA pH) 6.0 1        5.0-8.0                   



[facility] AND  23:35:00





             Test Item    Value        Reference Range Interpretation Comments

 

             UA Color (test code = Yellow *NA*(5/3/18 6:35                      

     



             UA Color)    PM)                                    



[facility] AND  23:35:00





             Test Item    Value        Reference Range Interpretation Comments

 

             UA Protein (test code = Trace *ABN*(5/3/18                         

  



             UA Protein)  6:35 PM)                               



[facility] AND  23:35:00





             Test Item    Value        Reference Range Interpretation Comments

 

             UA Turbidity (test code Slight Cloudy (5/3/18                      

     



             = UA Turbidity) 6:35 PM)                               



[facility] AND  23:35:00





             Test Item    Value        Reference Range Interpretation Comments

 

             UA Hyal Cast 0-2 (5/3/18 6:35 See_Comment                [Automated

 message]



             (test code = UA PM)                                    The system w

michelle



             Malinda Cast)                                          generated this 

result



                                                                 transmitted ref

erence



                                                                 range: <=2. The



                                                                 reference range

 was



                                                                 not used to int

erpret



                                                                 this result as



                                                                 normal/abnormal

.



[facility] AND [id number] 23:35:00





             Test Item    Value        Reference Range Interpretation Comments

 

             UA Bacteria (test code = UA Occasional /HPF                        

   



             Bacteria)                                           



[facility] AND [id number] 23:35:00





             Test Item    Value        Reference Range Interpretation Comments

 

             UA RBC (test code 11-20 /HPF   See_Comment                [Automate

d message] The



             = UA RBC)                                           system which ge

nerated



                                                                 this result tra

nsmitted



                                                                 reference range

: <=2. The



                                                                 reference range

 was not



                                                                 used to interpr

et this



                                                                 result as



                                                                 normal/abnormal

.



[facility] AND [id number] 23:35:00





             Test Item    Value        Reference Range Interpretation Comments

 

             UA Sq Epi (test code = UA Sq Occasional /LPF                       

    



             Epi)                                                



[facility] AND [id number] 23:35:00





             Test Item    Value        Reference Range Interpretation Comments

 

             UA WBC (test code = UA WBC)  /HPF                            



[id number]





             Test Item    Value        Reference Range Interpretation Comments

 

             Basophils # (test code 0.1          See_Comment                [Aut

omated message] The



             = Basophils #)                                        system which 

generated



                                                                 this result tra

nsmitted



                                                                 reference range

: <=0.2.



                                                                 The reference r

zhen was



                                                                 not used to int

erpret



                                                                 this result as



                                                                 normal/abnormal

.



[facility] [id number] 23:20:00





             Test Item    Value        Reference Range Interpretation Comments

 

             Polychrom (test code = Polychrom) Slight                           

      



[facility] [id number] 23:20:00





             Test Item    Value        Reference Range Interpretation Comments

 

             Plt Morph (test code = Normal (5/3/18 6:20 PM)                     

      



             Plt Morph)                                          



[facility] [id number] 23:20:00





             Test Item    Value        Reference Range Interpretation Comments

 

             Basophils # (test code 0.1          See_Comment                [Aut

omated message] The



             = Basophils #)                                        system which 

generated



                                                                 this result tra

nsmitted



                                                                 reference range

: <=0.2.



                                                                 The reference r

zhen was



                                                                 not used to int

erpret



                                                                 this result as



                                                                 normal/abnormal

.



[facility] [id number] 23:20:00





             Test Item    Value        Reference Range Interpretation Comments

 

             Polychrom (test code = Polychrom) Slight                           

      



[facility] [id number] 23:20:00





             Test Item    Value        Reference Range Interpretation Comments

 

             Plt Morph (test code = Normal (5/3/18 6:20 PM)                     

      



             Plt Morph)                                          



[facility] [id number] 23:20:00





             Test Item    Value        Reference Range Interpretation Comments

 

             Basophils # (test code 0.1          See_Comment                [Aut

omated message] The



             = Basophils #)                                        system which 

generated



                                                                 this result tra

nsmitted



                                                                 reference range

: <=0.2.



                                                                 The reference r

zhen was



                                                                 not used to int

erpret



                                                                 this result as



                                                                 normal/abnormal

.



[facility] [id number] 23:20:00





             Test Item    Value        Reference Range Interpretation Comments

 

             Polychrom (test code = Polychrom) Slight                           

      



[facility] [id number] 23:20:00





             Test Item    Value        Reference Range Interpretation Comments

 

             Plt Morph (test code = Normal (5/3/18 6:20 PM)                     

      



             Plt Morph)                                          



[facility] CenterTugoedzMXVIUWJTBD1926-09-21 23:20:00





             Test Item    Value        Reference Range Interpretation Comments

 

             Basophils # (test code 0.1          See_Comment                [Aut

omated message] The



             = Basophils #)                                        system which 

generated



                                                                 this result tra

nsmitted



                                                                 reference range

: <=0.2.



                                                                 The reference r

zhen was



                                                                 not used to int

erpret



                                                                 this result as



                                                                 normal/abnormal

.



[facility] [id number] 23:20:00





             Test Item    Value        Reference Range Interpretation Comments

 

             Polychrom (test code = Polychrom) Slight                           

      



[facility] [id number] 23:20:00





             Test Item    Value        Reference Range Interpretation Comments

 

             Plt Morph (test code = Normal (5/3/18 6:20 PM)                     

      



             Plt Morph)                                          



[facility] [id number] 23:20:00





             Test Item    Value        Reference Range Interpretation Comments

 

             Basophils # (test code 0.1          See_Comment                [Aut

omated message] The



             = Basophils #)                                        system which 

generated



                                                                 this result tra

nsmitted



                                                                 reference range

: <=0.2.



                                                                 The reference r

zhen was



                                                                 not used to int

erpret



                                                                 this result as



                                                                 normal/abnormal

.



[facility] [id number] 23:20:00





             Test Item    Value        Reference Range Interpretation Comments

 

             Polychrom (test code = Polychrom) Slight                           

      



[facility] CenterHleykkyWPZYALHNBY2402-22-12 23:20:00





             Test Item    Value        Reference Range Interpretation Comments

 

             Plt Morph (test code = Normal (5/3/18 6:20 PM)                     

      



             Plt Morph)                                          



[facility] [id number] 23:20:00





             Test Item    Value        Reference Range Interpretation Comments

 

             Basophils # (test code 0.1          See_Comment                [Aut

omated message] The



             = Basophils #)                                        system which 

generated



                                                                 this result tra

nsmitted



                                                                 reference range

: <=0.2.



                                                                 The reference r

zhen was



                                                                 not used to int

erpret



                                                                 this result as



                                                                 normal/abnormal

.



[facility] [id number] 23:20:00





             Test Item    Value        Reference Range Interpretation Comments

 

             Polychrom (test code = Polychrom) Slight                           

      



[facility] [id number] 23:20:00





             Test Item    Value        Reference Range Interpretation Comments

 

             Plt Morph (test code = Normal (5/3/18 6:20 PM)                     

      



             Plt Morph)                                          



[facility] C[id number] 23:20:00





             Test Item    Value        Reference Range Interpretation Comments

 

             Basophils # (test code 0.1          See_Comment                [Aut

omated message] The



             = Basophils #)                                        system which 

generated



                                                                 this result tra

nsmitted



                                                                 reference range

: <=0.2.



                                                                 The reference r

zhen was



                                                                 not used to int

erpret



                                                                 this result as



                                                                 normal/abnormal

.



[facility] C[id number] 23:20:00





             Test Item    Value        Reference Range Interpretation Comments

 

             Polychrom (test code = Polychrom) Slight                           

      



[facility] [id number] 23:20:00





             Test Item    Value        Reference Range Interpretation Comments

 

             Plt Morph (test code = Normal (5/3/18 6:20 PM)                     

      



             Plt Morph)                                          



[facility] Cent[id number] 23:20:00





             Test Item    Value        Reference Range Interpretation Comments

 

             Basophils # (test code 0.1          See_Comment                [Aut

omated message] The



             = Basophils #)                                        system which 

generated



                                                                 this result tra

nsmitted



                                                                 reference range

: <=0.2.



                                                                 The reference r

zhen was



                                                                 not used to int

erpret



                                                                 this result as



                                                                 normal/abnormal

.



[facility] [id number] 23:20:00





             Test Item    Value        Reference Range Interpretation Comments

 

             Polychrom (test code = Polychrom) Slight                           

      



[id number]





             Test Item    Value        Reference Range Interpretation Comments

 

             Plt Morph (test code = Normal (5/3/18 6:20 PM)                     

      



             Plt Morph)                                          



[facility] [id number] 16:22:00





             Test Item    Value        Reference Range Interpretation Comments

 

             CULTURE (BEAKER) (test No growth in 5 days                         

  



             code = 1095)                                        



BLOOD [id number] 16:22:00





             Test Item    Value        Reference Range Interpretation Comments

 

             CULTURE (BEAKER) (test No growth in 5 days                         

  



             code = 1095)                                        



(MANUAL DIFFERENTIAL)2017 22:29:00





             Test Item    Value        Reference Range Interpretation Comments

 

             TOTAL COUNTED (BEAKER) (test code =                                

        



             1351)                                               

 

             WBC MORPHOLOGY (BEAKER) (test code = Normal                        

         



             487)                                                

 

             PLT MORPHOLOGY (BEAKER) (test code = Normal                        

         



             486)                                                

 

             RBC MORPHOLOGY (BEAKER) (test code = Normal                        

         



             762)                                                



CBC W/PLT COUNT &amp; AUTO [id number] 22:28:00





             Test Item    Value        Reference Range Interpretation Comments

 

             WHITE BLOOD CELL COUNT (BEAKER) 7.3 K/ L     4.0-10.0              

    



             (test code = 775)                                        

 

             RED BLOOD CELL COUNT (BEAKER) 5.09 M/ L    4.20-5.80               

  



             (test code = 761)                                        

 

             HEMOGLOBIN (BEAKER) (test code = 13.0 GM/DL   13.0-16.8            

     



             410)                                                

 

             HEMATOCRIT (BEAKER) (test code = 42.3 %       40.0-50.0            

     



             411)                                                

 

             MEAN CORPUSCULAR VOLUME (BEAKER) 83.0 fL      82.0-98.0            

     



             (test code = 753)                                        

 

             MEAN CORPUSCULAR HEMOGLOBIN 25.6 pg      27.0-33.0    L            



             (BEAKER) (test code = 751)                                        

 

             MEAN CORPUSCULAR HEMOGLOBIN CONC 30.8 GM/DL   32.0-36.0    L       

     



             (BEAKER) (test code = 752)                                        

 

             RED CELL DISTRIBUTION WIDTH 18.0 %       10.3-14.2    H            



             (BEAKER) (test code = 412)                                        

 

             PLATELET COUNT (BEAKER) (test 331 K/CU MM  150-430                 

  



             code = 756)                                         

 

             MEAN PLATELET VOLUME (BEAKER) 8.5 fL       6.5-10.5                

  



             (test code = 754)                                        

 

             NUCLEATED RED BLOOD CELLS 0 /100 WBC   0-0                       



             (BEAKER) (test code = 413)                                        

 

             NEUTROPHILS RELATIVE PERCENT 65 %                                  

 



             (BEAKER) (test code = 429)                                        

 

             LYMPHOCYTES RELATIVE PERCENT 23 %                                  

 



             (BEAKER) (test code = 430)                                        

 

             MONOCYTES RELATIVE PERCENT 8 %                                    



             (BEAKER) (test code = 431)                                        

 

             EOSINOPHILS RELATIVE PERCENT 3 %                                   

 



             (BEAKER) (test code = 432)                                        

 

             BASOPHILS RELATIVE PERCENT 1 %                                    



             (BEAKER) (test code = 437)                                        

 

             NEUTROPHILS ABSOLUTE COUNT 4.75 K/ L    1.80-8.00                 



             (BEAKER) (test code = 670)                                        

 

             LYMPHOCYTES ABSOLUTE COUNT 1.68 K/ L    1.48-4.50                 



             (BEAKER) (test code = 414)                                        

 

             MONOCYTES ABSOLUTE COUNT (BEAKER) 0.55 K/ L    0.00-1.30           

      



             (test code = 415)                                        

 

             EOSINOPHILS ABSOLUTE COUNT 0.24 K/ L    0.00-0.50                 



             (BEAKER) (test code = 416)                                        

 

             BASOPHILS ABSOLUTE COUNT (BEAKER) 0.05 K/ L    0.00-0.20           

      



             (test code = 417)                                        



[id number] METABOLIC [id number] 11:11:00





             Test Item    Value        Reference Range Interpretation Comments

 

             SODIUM (BEAKER) 138 meq/L    136-145                   



             (test code = 381)                                        

 

             POTASSIUM (BEAKER) 4.0 meq/L    3.5-5.1                   



             (test code = 379)                                        

 

             CHLORIDE (BEAKER) 108 meq/L           H            



             (test code = 382)                                        

 

             CO2 (BEAKER) (test 23 meq/L     22-29                     



             code = 355)                                         

 

             BLOOD UREA NITROGEN 11 mg/dL     7-21                      



             (BEAKER) (test code                                        



             = 354)                                              

 

             CREATININE (BEAKER) 0.74 mg/dL   0.57-1.25                 



             (test code = 358)                                        

 

             GLUCOSE RANDOM 124 mg/dL           H            



             (BEAKER) (test code                                        



             = 652)                                              

 

             CALCIUM (BEAKER) 8.9 mg/dL    8.4-10.2                  



             (test code = 697)                                        

 

             EGFR (BEAKER) (test 150 mL/min/1.73                           ESTIM

ATED GFR IS



             code = 1092) sq m                                   NOT AS ACCURATE

 AS



                                                                 CREATININE



                                                                 CLEARANCE IN



                                                                 PREDICTING



                                                                 GLOMERULAR



                                                                 FILTRATION RATE

.



                                                                 ESTIMATED GFR I

S



                                                                 NOT APPLICABLE 

FOR



                                                                 DIALYSIS PATIEN

TS.



URINE [id number] 09:56:00





             Test Item    Value        Reference Range Interpretation Comments

 

             CULTURE (BEAKER) (test <10,000 col/mL skin                         

  



             code = 1095) jaime                                  



COMPREHENSIVE METABOLIC [id number] 08:49:00





             Test Item    Value        Reference Range Interpretation Comments

 

             TOTAL PROTEIN 7.3 gm/dL    6.0-8.3                   



             (BEAKER) (test code =                                        



             770)                                                

 

             ALBUMIN (BEAKER) 3.3 g/dL     3.5-5.0      L            



             (test code = 1145)                                        

 

             ALKALINE PHOSPHATASE 89 U/L                           



             (BEAKER) (test code =                                        



             346)                                                

 

             BILIRUBIN TOTAL 1.4 mg/dL    0.2-1.2      H            



             (BEAKER) (test code =                                        



             377)                                                

 

             SODIUM (BEAKER) (test 137 meq/L    136-145                   



             code = 381)                                         

 

             POTASSIUM (BEAKER) 3.7 meq/L    3.5-5.1                   



             (test code = 379)                                        

 

             CHLORIDE (BEAKER) 108 meq/L           H            



             (test code = 382)                                        

 

             CO2 (BEAKER) (test 17 meq/L     22-29        L            



             code = 355)                                         

 

             BLOOD UREA NITROGEN 12 mg/dL     7-21                      



             (BEAKER) (test code =                                        



             354)                                                

 

             CREATININE (BEAKER) 0.78 mg/dL   0.57-1.25                 



             (test code = 358)                                        

 

             GLUCOSE RANDOM 119 mg/dL           H            



             (BEAKER) (test code =                                        



             652)                                                

 

             CALCIUM (BEAKER) 8.6 mg/dL    8.4-10.2                  



             (test code = 697)                                        

 

             AST (SGOT) (BEAKER) 13 U/L       5-34                      



             (test code = 353)                                        

 

             ALT (SGPT) (BEAKER) 28 U/L       6-55                      



             (test code = 347)                                        

 

             EGFR (BEAKER) (test 141                                    ESTIMATE

D GFR IS



             code = 1092) mL/min/1.73 sq                           NOT AS ACCURA

TE AS



                          m                                      CREATININE



                                                                 CLEARANCE IN



                                                                 PREDICTING



                                                                 GLOMERULAR



                                                                 FILTRATION RATE

.



                                                                 ESTIMATED GFR I

S



                                                                 NOT APPLICABLE 

FOR



                                                                 DIALYSIS PATIEN

TS.



CBC W/PLT COUNT &amp; AUTO [id number] 08:46:00





             Test Item    Value        Reference Range Interpretation Comments

 

             WHITE BLOOD CELL COUNT (BEAKER) 9.4 K/ L     4.0-10.0              

    



             (test code = 775)                                        

 

             RED BLOOD CELL COUNT (BEAKER) 4.79 M/ L    4.20-5.80               

  



             (test code = 761)                                        

 

             HEMOGLOBIN (BEAKER) (test code = 12.8 GM/DL   13.0-16.8    L       

     



             410)                                                

 

             HEMATOCRIT (BEAKER) (test code = 40.0 %       40.0-50.0            

     



             411)                                                

 

             MEAN CORPUSCULAR VOLUME (BEAKER) 83.4 fL      82.0-98.0            

     



             (test code = 753)                                        

 

             MEAN CORPUSCULAR HEMOGLOBIN 26.7 pg      27.0-33.0    L            



             (BEAKER) (test code = 751)                                        

 

             MEAN CORPUSCULAR HEMOGLOBIN CONC 32.0 GM/DL   32.0-36.0            

     



             (BEAKER) (test code = 752)                                        

 

             RED CELL DISTRIBUTION WIDTH 18.2 %       10.3-14.2    H            



             (BEAKER) (test code = 412)                                        

 

             PLATELET COUNT (BEAKER) (test 313 K/CU MM  150-430                 

  



             code = 756)                                         

 

             MEAN PLATELET VOLUME (BEAKER) 8.6 fL       6.5-10.5                

  



             (test code = 754)                                        

 

             NUCLEATED RED BLOOD CELLS 0 /100 WBC   0-0                       



             (BEAKER) (test code = 413)                                        

 

             NEUTROPHILS RELATIVE PERCENT 70 %                                  

 



             (BEAKER) (test code = 429)                                        

 

             LYMPHOCYTES RELATIVE PERCENT 16 %                                  

 



             (BEAKER) (test code = 430)                                        

 

             MONOCYTES RELATIVE PERCENT 12 %                                   



             (BEAKER) (test code = 431)                                        

 

             EOSINOPHILS RELATIVE PERCENT 1 %                                   

 



             (BEAKER) (test code = 432)                                        

 

             BASOPHILS RELATIVE PERCENT 1 %                                    



             (BEAKER) (test code = 437)                                        

 

             NEUTROPHILS ABSOLUTE COUNT 6.54 K/ L    1.80-8.00                 



             (BEAKER) (test code = 670)                                        

 

             LYMPHOCYTES ABSOLUTE COUNT 1.50 K/ L    1.48-4.50                 



             (BEAKER) (test code = 414)                                        

 

             MONOCYTES ABSOLUTE COUNT (BEAKER) 1.13 K/ L    0.00-1.30           

      



             (test code = 415)                                        

 

             EOSINOPHILS ABSOLUTE COUNT 0.13 K/ L    0.00-0.50                 



             (BEAKER) (test code = 416)                                        

 

             BASOPHILS ABSOLUTE COUNT (BEAKER) 0.06 K/ L    0.00-0.20           

      



             (test code = 417)                                        



0.00URINALYSIS W/ [id number] 20:36:00





             Test Item    Value        Reference Range Interpretation Comments

 

             COLOR (BEAKER) (test code = 470) Yellow                            

     

 

             CLARITY (BEAKER) (test code = 469) Hazy                            

       

 

             SPECIFIC GRAVITY UA (BEAKER) (test 1.012        1.001-1.035        

       



             code = 468)                                         

 

             PH UA (BEAKER) (test code = 467) 5.5          5.0-8.0              

     

 

             PROTEIN UA (BEAKER) (test code = 100 mg/dL    Negative     A       

     



             464)                                                

 

             GLUCOSE UA (BEAKER) (test code = Negative     Negative             

     



             365)                                                

 

             KETONES UA (BEAKER) (test code = Negative     Negative             

     



             371)                                                

 

             BILIRUBIN UA (BEAKER) (test code = Negative     Negative           

       



             462)                                                

 

             BLOOD UA (BEAKER) (test code = 461) Moderate     Negative     A    

        

 

             NITRITE UA (BEAKER) (test code = Negative     Negative             

     



             465)                                                

 

             LEUKOCYTE ESTERASE UA (BEAKER) Large        Negative     A         

   



             (test code = 466)                                        

 

             UROBILINOGEN UA (BEAKER) (test code 3.0 mg/dL    0.2-1.0      H    

        



             = 463)                                              

 

             RBC UA (BEAKER) (test code = 519) 19 /HPF                          

      

 

             WBC UA (BEAKER) (test code = 520) 182 /HPF                         

      

 

             SOURCE(BEAKER) (test code = 9605)                                  

      



BASIC METABOLIC [id number] 17:00:00





             Test Item    Value        Reference Range Interpretation Comments

 

             SODIUM (BEAKER) 140 meq/L    136-145                   



             (test code = 381)                                        

 

             POTASSIUM (BEAKER) 3.7 meq/L    3.5-5.1                   



             (test code = 379)                                        

 

             CHLORIDE (BEAKER) 112 meq/L           H            



             (test code = 382)                                        

 

             CO2 (BEAKER) (test 17 meq/L     22-29        L            



             code = 355)                                         

 

             BLOOD UREA NITROGEN 23 mg/dL     7-21         H            



             (BEAKER) (test code                                        



             = 354)                                              

 

             CREATININE (BEAKER) 1.00 mg/dL   0.57-1.25                 



             (test code = 358)                                        

 

             GLUCOSE RANDOM 102 mg/dL                        



             (BEAKER) (test code                                        



             = 652)                                              

 

             CALCIUM (BEAKER) 8.7 mg/dL    8.4-10.2                  



             (test code = 697)                                        

 

             EGFR (BEAKER) (test 106 mL/min/1.73                           ESTIM

ATED GFR IS



             code = 1092) sq m                                   NOT AS ACCURATE

 AS



                                                                 CREATININE



                                                                 CLEARANCE IN



                                                                 PREDICTING



                                                                 GLOMERULAR



                                                                 FILTRATION RATE

.



                                                                 ESTIMATED GFR I

S



                                                                 NOT APPLICABLE 

FOR



                                                                 DIALYSIS PATIEN

TS.



Specimen slightly ictericCBC W/PLT COUNT &amp; AUTO [id number] 
12:18:00





             Test Item    Value        Reference Range Interpretation Comments

 

             WHITE BLOOD CELL COUNT (BEAKER) 16.4 K/ L    4.0-10.0     H        

    



             (test code = 775)                                        

 

             RED BLOOD CELL COUNT (BEAKER) 5.22 M/ L    4.20-5.80               

  



             (test code = 761)                                        

 

             HEMOGLOBIN (BEAKER) (test code = 14.4 GM/DL   13.0-16.8            

     



             410)                                                

 

             HEMATOCRIT (BEAKER) (test code = 42.9 %       40.0-50.0            

     



             411)                                                

 

             MEAN CORPUSCULAR VOLUME (BEAKER) 82.2 fL      82.0-98.0            

     



             (test code = 753)                                        

 

             MEAN CORPUSCULAR HEMOGLOBIN 27.5 pg      27.0-33.0                 



             (BEAKER) (test code = 751)                                        

 

             MEAN CORPUSCULAR HEMOGLOBIN CONC 33.5 GM/DL   32.0-36.0            

     



             (BEAKER) (test code = 752)                                        

 

             RED CELL DISTRIBUTION WIDTH 16.2 %       10.3-14.2    H            



             (BEAKER) (test code = 412)                                        

 

             PLATELET COUNT (BEAKER) (test 329 K/CU MM  150-430                 

  



             code = 756)                                         

 

             MEAN PLATELET VOLUME (BEAKER) 8.2 fL       6.5-10.5                

  



             (test code = 754)                                        

 

             NUCLEATED RED BLOOD CELLS 0 /100 WBC   0-0                       



             (BEAKER) (test code = 413)                                        

 

             NEUTROPHILS RELATIVE PERCENT 83 %                                  

 



             (BEAKER) (test code = 429)                                        

 

             LYMPHOCYTES RELATIVE PERCENT 7 %                                   

 



             (BEAKER) (test code = 430)                                        

 

             MONOCYTES RELATIVE PERCENT 9 %                                    



             (BEAKER) (test code = 431)                                        

 

             EOSINOPHILS RELATIVE PERCENT 0 %                                   

 



             (BEAKER) (test code = 432)                                        

 

             BASOPHILS RELATIVE PERCENT 0 %                                    



             (BEAKER) (test code = 437)                                        

 

             NEUTROPHILS ABSOLUTE COUNT 1.62 K/ L    1.80-8.00    L            



             (BEAKER) (test code = 670)                                        

 

             LYMPHOCYTES ABSOLUTE COUNT 1.15 K/ L    1.48-4.50    L            



             (BEAKER) (test code = 414)                                        

 

             MONOCYTES ABSOLUTE COUNT (BEAKER) 1.56 K/ L    0.00-1.30    H      

      



             (test code = 415)                                        

 

             EOSINOPHILS ABSOLUTE COUNT 0.01 K/ L    0.00-0.50                 



             (BEAKER) (test code = 416)                                        

 

             BASOPHILS ABSOLUTE COUNT (BEAKER) 0.02 K/ L    0.00-0.20           

      



             (test code = 417)                                        



(MANUAL DIFFERENTIAL)2017 12:18:00





             Test Item    Value        Reference Range Interpretation Comments

 

             TOTAL COUNTED (BEAKER) (test code =                                

        



             1351)                                               

 

             WBC MORPHOLOGY (BEAKER) (test code = Normal                        

         



             487)                                                

 

             PLT MORPHOLOGY (BEAKER) (test code = Normal                        

         



             486)                                                

 

             RBC MORPHOLOGY (BEAKER) (test code = Normal                        

         



             762)                                                







Notes







                Date/Time       Note            Provider        Source

 

                2022      EXAM: DIAGNOSTIC LUMBAR PUNCTURE WITH FLUOROSCOP

IC GUIDANCE                 [facility]



                14:00:00-00:00  DATE: 2022 17:45                 Center



                                INDICATION: ' - headaches, h/o  shunt, r/o int

racranial hypotension'.                 



                                COMPARISON: None.                 



                                TECHNIQUE AND FINDINGS:                 



                                                    Consent: Informed written an

d verbal consent were obtained from the patient. 

The benefits, risks, and alternatives to the procedure were explained and all 
questions were answered.                            



                                                                



                                                    Procedure setup: The patient

 was then brought to the fluoroscopy room, placed 

on the fluoroscopy table. Prior to beginning the procedure, 'time-out' was 
performed under the universal protocol with 2 pat                           



                                                    ient identifiers establishin

g the correct patient, verifying the correct 

procedure, correct side and site, correct patient position, and correct 
equipment. The appropriate level was localized using fluo                       

    



                                                    roscopic guidance. The lower

 back was prepped and draped in the usual sterile 

fashion.                                            



                                                                



                                                    Procedure: A total of 4 mini

mal 1% lidocaine buffered with bicarbonate was used

for local numbing. Under fluoroscopic guidance, a spinal needle was advanced 
into the thecal sac and CSF was obtained with                           



                                out complication. The needle was removed and a b

andage was applied.                 



                                Procedure details:                 



                                - Level: L2-L3 interlaminar space               

  



                                - Needle gauge: 22                 



                                - Needle length: 5.0 in                 



                                - CSF color: clear                 



                                - CSF volume: 12 mL                 



                                - Opening pressure: 12 cm of water              

   



                                - Closing pressure: Not obtained.               

  



                                - Patient position: Left lateral decubitus      

           



                                Yony Ruiz MD was present for the proc

edure.                 



                                Fluoroscopy time: 0.03 seconds                 



                                Skin dose: 655 mGy                 



                                IMPRESSION:                     



                                1. Successful fluoroscopically guided lumbar pun

cture.                 



                                2. CSF opening pressure of 12 cm of water.      

           



                                3. A total of 12 mL clear CSF was sent to the [facility] for analysis.                 

 

                2022      EXAM: XR CHEST 1 VIEW                 [facility]

[facility]



                10:40:00-00:00  DATE: 2022 10:50                 Center



                                                                



                                INDICATION: - INCREASE O2 REQUIREMENTS          

       



                                                    COMPARISON: CT abdomen and p

mell with contrast 3/25/2022, chest radiograph 

5/3/2018                                            



                                TECHNIQUE: AP chest.                 



                                FINDINGS:                       



                                                    Lines, tubes and hardware: V

P drains are seen overlying the chest with tips out

of the field of view.                               



                                                    Lungs and pleura: Low lung v

olumes causing bibasilar subsegmental atelectasis. 

No pleural effusion. No pneumothorax.                           



                                Heart and mediastinum: Normal cardiomediastinal 

silhouette.                 



                                Bones and soft tissues: No acute abnormality.   

              



                                IMPRESSION:                     



                                1. No acute cardiopulmonary abnormality.        

         



                                2. Low lung volumes with bibasilar subsegmental 

atelectasis.                 

 

                2022      EXAM: CT BRAIN WITHOUT CONTRAST                 

[facility]



                03:05:49-00:00  DATE: 2022 0313 hours                 [facility]

r



                                INDICATION: - stability                 



                                COMPARISON: 2022.                 



                                                    TECHNIQUE: Axial CT images o

f the brain were obtained. Sagittal and coronal 

reformats.                                          



                                IV contrast: None.                 



                                DLP: Please refer to the technologist's records.

                 



                                FINDINGS:                       



                                                    There is no edema, hemorrhag

e, mass lesion or other acute intracranial 

abnormality. There is no extra-axial fluid collection.                          

 



                                                    Unchanged configuration of t

he shunted ventricles decompressed by a right 

frontal approach shunt catheter. Abundant right parietal approach shunt 
catheter.                                           



                                                    Chronic findings include dys

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
with evidence of Chiari II malformation.                           



                                                    The calvarium is intact. The

 included paranasal sinuses and mastoid air cells 

are clear.                                          



                                IMPRESSION:                     



                                No acute finding. No adverse interval change.   

              

 

                2022      EXAM: XR SKULL 2 VIEWS                 [facility]



                03:40:00-00:00  EXAM: XR CHEST 2 VIEWS                 [place]



                                EXAM: XR ABDOMEN 2 VIEWS                 



                                DATE: 2022 4:00                 



                                INDICATION: - VPS.                 



                                COMPARISON: Shunt series radiograph dated 3/25/2

022 and 2013.                 



                                TECHNIQUE: AP and lateral radiographs of the sku

ll, chest and abdomen.                 



                                                    FINDINGS: A intact right fro

ntal ventriculoperitoneal catheter with proximal 

limb within the midline middle cranial fossa. No programmable valve device. 
There is likely a chronic break in the catheter a                           



                                                    ttached to the exit and of t

he valve with minimal inferior retraction and 

asymmetric stretching of the catheter. Distally the catheter catheter courses 
through the right posterior neck soft tissue, cros                           



                                                    ses over to the left anterio

r chest wall with the distal limb within the left 

lower quadrant.                                     



                                                    Fragments of disconnected sh

unt system identified within the right posterior 

parietal cranium, right side of the neck and right side of the abdomen.         

                  



                                Visualized lungs are clear. Nonobstructive bowel

 gas pattern.                 



                                IMPRESSION:                     



                                                    Stable right frontal shunt c

atheter with a programmable valve in the right high

frontal scalp. There is likely a chronic break break in the catheter attached to
the exit end of the valve with inferior r                           



                                                    etraction and asymmetric str

etching of the catheter. The appearance is 

unchanged compared to the prior study done on 2013.                       

    

 

                2022      EXAM: CT BRAIN WITHOUT CONTRAST.                

 [facility]



                22:01:59-00:00  DATE: 2022 18:41                 Center



                                INDICATION: - Stability CT                 



                                COMPARISON: Noncontrast head CT done on 20.                 



                                                    TECHNIQUE: Axial volumetric 

CT images of the brain were obtained. 

Reconstructions are available.                           



                                IV contrast: None.                 



                                FINDINGS:                       



                                                    Evaluation is unchanged comp

ared to the immediate prior noncontrast head CT 

done on 2022. Right frontal shunt catheter with a scalp reservoir is 
noted. Abandoned right parietal shunt catheter is in stable position.           

                



                                                    Stable dysmorphic brain with

 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 

the corpus callosum are seen. Stable dysmorphic posterior cerebral hemispheres 
with evidence of Chiari II malformation are seen.                           



                                                    There is no intracranial ble

ed. There is no vasogenic edema in the brain 

parenchyma. Stable effacement of the ventricles is seen.                        

   



                                Visualized orbits and paranasal sinuses appear u

nremarkable.                 



                                IMPRESSION:                     



                                                     Stable noncontrast head CT 

with stigmata of Chiari II malformation. No change 

in the size or configuration of the ventricles.                           

 

                2022      EXAM: NM Ventriculoperitoneal Shunt Patency     

            [facility]



                13:10:05-00:00  DATE: 3/28/2022 6:21 CDT                 Center



                                INDICATION: Rule out possible ventriculoperitone

al shunt failure.                 



                                COMPARISON: Brain Stealth CT without contrast                  



                                                    TECHNIQUE: After administrat

ion of 1.1 mCi of [id number] in the reservoir of 

the shunt aseptically, dynamic images of the head were obtained for 30 minutes. 
1 hour delayed images of head/neck and the abdomen were obtained.               

            



                                FINDINGS:                       



                                                    No measurable CSF pressure w

ith the patient sitting up and unable to lie flat. 

CSF was aspirated.                                  



                                                    Tracer is seen in right temp

oral reservoir immediately after tracer 

administration. Subsequent drainage through the shunt tubing is seen through the
neck, chest, and finally drains in the peritoneal cav                           



                                                    ity on the delayed images. T

he findings are suggestive of patent 

ventriculoperitoneal shunt with no evidence of leak.                           



                                IMPRESSION:                     



                                Patent ventricle peritoneal shunt with no eviden

ce of leaks.                 

 

                2022      EXAM: CT BRAIN WITHOUT CONTRAST.                

 [facility]



                14:47:46-00:00  DATE: 3/25/2022 1:50 PM CDT                 [facility]

er



                                INDICATION: - shunt revision                 



                                                    COMPARISON: Noncontrast head

 CT done on 2022 at an outside facility and 

2018 at [facility].                           



                                                    TECHNIQUE: Axial volumetric 

CT images of the brain were obtained. 

Reconstructions are available.                           



                                IV contrast: None.                 



                                FINDINGS:                       



                                                    Stigmata of Chiari II malfor

mation are seen. There is a small posterior fossa 

with crowding and upwards transtentorial herniation. Tonsillar herniation is 
seen and crowding is noted at the foramen magnum.                           



                                                    Dysgenesis of the body and s

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

dysgenesis of the adjacent medial cerebral hemispheres.                         

  



                                                    A right frontal shunt cathet

er is noted in the catheter tubing is unchanged. A 

discontinued right parietal shunt catheter is seen. Focal dystrophic dural 
calcification is noted along the left parietal convexity.                       

    



                                                    The collapsed lateral ventri

cles are unchanged in size since 2018. Sulcal 

spaces are unchanged.                               



                                                    Soft tissues of the scalp, c

alvarium, visualized orbits and paranasal sinuses 

appear unremarkable.                                



                                IMPRESSION:                     



                                1. No acute intracranial abnormality.           

      



                                                    2. Stigmata of Chiari II mal

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

discontinued right parietal shunt catheter. The collapsed ventricles are 
unchanged in size and appearance since 2018.                           

 

                2022      EXAM: CT ABDOMEN AND PELVIS WITH CONTRAST       

          [facility]



                14:47:46-00:00  DATE: 3/25/2022 13:35 CDT                 Center



                                                                



                                INDICATION: - eval for pseudocyst around shunt  

               



                                                                



                                COMPARISON: 08                 



                                                    TECHNIQUE: Volumetric CT of 

the abdomen and pelvis acquired following the 

intravenous administration of contrast. Axial, coronal and sagittal images are 
provided.                                           



                                IV contrast: Refer to MAR/CT technologist docume

ntation                 



                                Enteric contrast: None.                 



                                DLP (mGy-cm): Refer to CT protocol form         

        



                                FINDINGS:                       



                                : Noncontributory.                 



                                                    Lines, tubes and hardware: T

here are 3 peritoneal shunt catheters, with 2 

catheter tips terminating in the right upper quadrant and the catheter 
terminating in the left lower quadrant. Suprapubic catheter in place.           

                



                                Lower thorax: Minimal groundglass opacities in t

he lower lungs bilaterally.                 



                                Liver: Normal.                  



                                Biliary tree: No intra- or extrahepatic bile dariusz

t dilation.                 



                                Gallbladder: Surgically absent.                 



                                Pancreas: Normal.                 



                                Spleen: Normal.                 



                                Adrenals: Normal.                 



                                Kidneys and ureters: Normal.                 



                                Bladder: Suprapubic catheter in place. No abnorm

ality identified.                 



                                Reproductive organs: Prostate and seminal vesicl

es are unremarkable.                 



                                Gastrointestinal tract:                 



                                Lower esophagus: Normal.                 



                                Stomach: Normal.                 



                                Small bowel: Normal.                 



                                Colon: Moderate stool burden.                 



                                Appendix: Normal.                 



                                                    Peritoneum, mesentery and re

troperitoneum: No free air, ascites or loculated 

fluid. No pseudocyst identified adjacent to the 3 peritoneal shunts.            

               



                                Lymph nodes: Normal.                 



                                Vasculature:                    



                                Aorta and branches: Normal.                 



                                IVC and veins: Normal.                 



                                Portal and mesenteric vasculature: Normal.      

           



                                                    Bones: No acute abnormality.

 Bilateral hip dysplasia, likely congenital. Small 

bilateral hip joint effusions.                           



                                                    Soft tissues: Small fat-cont

aining umbilical hernia. Injection site granulomas.

                                                    



                                IMPRESSION:                     



                                                    1. No pseudocyst identified 

adjacent to the ventriculoperitoneal shunt 

catheters.                                          



                                                    2. Minimal scattered groundg

lass opacities in the lower lungs bilaterally, 

which may be infectious or inflammatory.                           

 

                2022      EXAM: SKULL 2 VIEWS                 [facility]

[facility]



                05:55:00-00:00  EXAM: CHEST 2 VIEWS                 Center



                                EXAM: ABDOMEN 2 VIEWS                 



                                DATE: 3/25/2022                 



                                INDICATION: - eval shunt continuity             

    



                                Comparison: Shuntogram obtained one day prior   

              



                                TECHNIQUE: AP and lateral views of the skull, ch

est and abdomen.                 



                                                    FINDINGS: An intact ventricu

lar peritoneal shunt is present, with the proximal 

limb in the right temporal region and distal limb in the left lower quadrant of 
the abdomen.                                        



                                                    Fragments of disconnected sh

unt systems are identified in the right occipital 

skull, right side of the neck, and right side of the abdomen.                   

        



                                There is no programmable flow device.           

      



                                                     The lungs are clear. The fan

wel gas pattern is normal. Posterior dysraphism is 

incidentally noted.                                 



                                IMPRESSION: Intact shunt.                 

 

                2018      EXAM: CT BRAIN WITHOUT CONTRAST -- OUTSIDE CONSU

LT                 [facility]



                04:49:00-00:00  DATE: 2018 4:49 AM [facility]

er



                                INDICATION: ' - PAIN'                 



                                COMPARISON: Noncontrast head CTs 2018, , 2013                 



                                                    TECHNIQUE: Noncontrast [facility] CT submitted for 2nd interpretation. 

205 images. Imaging was performed at [facility] on 
2018.                                         



                                IV contrast: None.                 



                                FINDINGS:                       



                                                    Overall unchanged exam. Righ

t transfrontal ventriculostomy catheter is 

unchanged in position. 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 
size and configuration. The abandoned right transparie                          

 



                                hudson catheter is unchanged. Stigmata of Chiari II

 malformation.                 



                                There is no intracranial hemorrhage or extra-axi

al collection.                 



                                                    No new parenchymal density a

bnormality. No mass or mass effect. Unchanged from 

the tonsillar herniation.                           



                                The density of the larger intracranial sinuses i

s normal.                 



                                                                



                                IMPRESSION: Unchanged examination compared to                  



                                        The final report agrees with the prelimi

nary report by the radiology resident. 

                                        

 

                2018      EXAM: SKULL 2 VIEWS                 [facility]

ica



                19:40:00-00:00  EXAM: CHEST 2 VIEWS                 Center



                                EXAM: ABDOMEN 2 VIEWS                 



                                DATE: 5/3/2018 7:20 PM CDT                 



                                INDICATION: Headache. - Please include Codman vi

ew for shunt setting.                 



                                COMPARISON: Brain shuntogram 2013          

       



                                TECHNIQUE: AP and lateral views of the skull, ch

est and abdomen.                 



                                FINDINGS:                       



                                                    There is a single intact red

nt tube with the proximal aspect in the right 

frontal calvarium coursing across midline to the left anterior chest and abdomen
with the tip coiled within the pelvis.                           



                                                                



                                                    A right parietal shunt with 

distal tip in the right lateral abdomen is 

discontinuous. Another shunt present with proximal tip in the right neck base 
and distal tip in the medial mid abdomen                           



                                                    Cholecystectomy clips are no

huma. The lungs are clear but underinflated. 

Appearance of the pelvis is unchanged with bilateral shallow acetabular roofs. 
No obvious posterior dislocation. Spinal dysraphism                           



                                 is seen with absence of the spinous process fro

m L3 to S1.                 



                                                                



                                IMPRESSION:                     



                                                                



                                                    1. No significant change. Th

e right transfrontal shunt catheter is intact along

its course with the distal tip in the pelvis.                           



                                                    2. Discontinuous right parie

to-occipital catheter and 2 discontinuous catheters

in the right chest and abdomen are unchanged.                           



                                3. Spinal dysraphism.                 

 

                2018      EXAM: CT BRAIN WITHOUT CONTRAST                 

[facility]



                18:24:00-00:00  DATE: 5/3/2018 627 PM CDT                 Center



                                INDICATION: 32-year-old male patient with histor

y of  shunt malfunction                 



                                COMPARISON: CT of the brain 2015, 2013

.                 



                                                    TECHNIQUE: Axial CT images o

f the brain were obtained. Sagittal and coronal 

reformats.                                          



                                IV contrast: None.                 



                                FINDINGS:                       



                                                    An orphaned right occipital 

approach  shunt is present. Also present is a 

right frontal approach  shunt with tip in the left lateral ventricle.         

                  



                                Narrowing of the lateral ventricles is present, 

unchanged from 2015.                 



                                There is mild uncal and cerebellar tonsillar her

niation, also unchanged.                 



                                No recent hemorrhage or territorial infarct. No 

mass. No midline shift.                 



                                No scalp fluid collections.                 



                                Sinuses are clear.                 



                                IMPRESSION:                     



                                No acute intracranial abnormality.              

   



                                Adequately decompressed ventricular system.     

            

 

                2018      EXAM: XR CHEST 1 VIEW                 [facility]

[facility]



                18:15:00-00:00  DATE: 5/3/2018 6:12 PM CDT                 [facility]

r



                                                                



                                INDICATION: - picc line use                 



                                UT SECTION: ER                  



                                COMPARISON: None.                 



                                TECHNIQUE: AP chest                 



                                FINDINGS:                       



                                Lines, tubes and hardware:                 



                                Left PICC tip at mid SVC.                 



                                                    Lungs and pleura: No pulmona

ry or pleural based abnormality is identified. 

Pulmonary vascularity is normal.                           



                                                    Heart and mediastinum: The h

eart size is normal for technique. The mediastinal 

contours are normal.                                



                                Bones: No acute bony abnormality is identified. 

                



                                Upper abdomen: Normal.                 



                                                                



                                IMPRESSION:                     



                                Left PICC tip at mid SVC.                 



                                No acute cardiopulmonary abnormality is observed

.

## 2023-06-12 NOTE — RAD REPORT
EXAM DESCRIPTION:  USExtremity Venous Uni Ltd6/12/2023 3:39 pm

 

CLINICAL HISTORY:  left leg pain

 

COMPARISON:  May 2023

 

FINDINGS:  Left common femoral, superficial femoral, greater saphenous, popliteal and  posterior tibi
al veins are compressible and demonstrate augmentation.

 

Doppler demonstrates good flow.

 

Grayscale, color and spectral analysis performed on all vessels

 

IMPRESSION:  No evidence of deep venous thrombosis involving the left lower extremity.

## 2023-06-12 NOTE — EDPHYS
Physician Documentation                                                                           

 [facility]                                                                 

Name: Redd Dale Jr                                                                            

Age: 37 yrs                                                                                       

Sex: Male                                                                                         

: 1985                                                                                   

MRN: [id number]                                                                                   

Arrival Date: 2023                                                                          

Time: 14:33                                                                                       

Account#: [id number]                                                                            

Bed 16                                                                                            

Private MD:                                                                                       

ED Physician Siddharth Gonsalez                                                                         

HPI:                                                                                              

                                                                                             

15:40 This 37 yrs old Black Male presents to ER via Wheelchair with complaints of Leg         kb  

      Infection.                                                                                  

15:40 The patient presents with pain, swelling, tenderness. The complaints affect the left    kb  

      leg. Context: resulted from an unknown cause. Onset: The symptoms/episode                   

      began/occurred yesterday. Modifying factors: The symptoms are alleviated by nothing.        

      the symptoms are aggravated by movement. Associated signs and symptoms: Pertinent           

      positives: swelling, Pertinent negatives calf tenderness, fever, nausea, numbness,          

      rash, tingling, vomiting, warmth, weakness. Treatment prior to arrival includes: no         

      previous treatment. Severity of symptoms: At their worst the symptoms were moderate, in     

      the emergency department the symptoms are unchanged. The patient has not experienced        

      similar symptoms in the past. The patient has been recently seen by a physician: the        

      patient's primary care provider, with similar presenting complaints, and was sent to        

      the [facility] Emergency Department for further evaluation.          

                                                                                                  

Historical:                                                                                       

- Allergies:                                                                                      

14:57 Amoxicillin;                                                                            jl7 

14:57 Bactrim;                                                                                jl7 

14:57 Ciprofloxacin;                                                                          jl7 

14:57 CLAVULANIC ACID;                                                                        jl7 

14:57 Demerol;                                                                                jl7 

14:57 Doxycycline;                                                                            jl7 

14:57 Levofloxacin;                                                                           jl7 

14:57 Morphine;                                                                               jl7 

14:57 PENICILLINS;                                                                            jl7 

14:57 Toradol;                                                                                jl7 

14:57 TRIMETHOPRIM;                                                                           jl7 

14:57 Vancomycin;                                                                             jl7 

14:57 Zofran;                                                                                 jl7 

- PMHx:                                                                                           

14:57 Asthma; Cerebral Palsy; cluster headaches; decubitus ulcers on feet; diabetes mellitus; jl7 

      GERD; Hydrocephalus; Hypertension; spina bifida;                                            

- PSHx:                                                                                           

14:57 Cholecystectomy; Shunt Revision;                                                        jl7 

                                                                                                  

- Immunization history:: Adult Immunizations up to date.                                          

- Social history:: Smoking status: unknown.                                                       

                                                                                                  

                                                                                                  

ROS:                                                                                              

15:39 Constitutional: Negative for fever, chills, and weight loss.                            kb  

15:39 MS/extremity: Positive for pain, swelling, tenderness, of the left leg.                     

15:39 All other systems are negative.                                                             

                                                                                                  

Exam:                                                                                             

15:39 Constitutional:  This is a well developed, well nourished patient who is awake, alert,  kb  

      and in no acute distress. Head/Face:  Normocephalic, atraumatic. ENT:  Moist Mucous         

      membranes Cardiovascular:  Regular rate and rhythm with a normal S1 and S2.  No             

      gallops, murmurs, or rubs.  No pulse deficits. Respiratory:  Respirations even and          

      unlabored. No increased work of breathing. Talking in full sentences Neuro:  Awake and      

      alert, GCS 15, oriented to person, place, time, and situation.                              

16:43 ECG was reviewed by the Attending Physician.                                              

                                                                                                  

Vital Signs:                                                                                      

14:56  / 89; Pulse 82; Resp 17; Temp 97.9; Pulse Ox 95% ; Pain 10/10;                   jl7 

15:45  / 83; Pulse 108; Resp 16; Pulse Ox 94% on R/A;                                   db  

16:30  / 94; Pulse 114; Resp 18; Pulse Ox 96% ;                                         db  

17:15  / 117; Pulse 106; Resp 18; Pulse Ox 96% on R/A;                                  db  

20:22  / 81; Pulse 107; Resp 18; Pulse Ox 98% on R/A;                                   ll3 

14:56 Pain Scale: Adult                                                                       jl7 

                                                                                                  

MDM:                                                                                              

15:00 Patient medically screened.                                                               

15:37 Data reviewed: vital signs, nurses notes. Management of patient was discussed with the  kb  

      following: Primary Care Provider: Discussed with Dr Mc, who came and saw pt in the       

      ED. Wants labs and x-rays to rule out osteomyelitis. Recommends follow up with him on       

      outpatient basis if workup essentially normal for pt.                                       

15:41 Differential diagnosis: dvt, osteomyelitis. Care significantly affected by the          kb  

      following chronic conditions: Diabetes, spina bifida.                                       

18:30 Counseling: I had a detailed discussion with the patient and/or guardian regarding: the kb  

      historical points, exam findings, and any diagnostic results supporting the                 

      discharge/admit diagnosis, lab results, radiology results, the need for outpatient          

      follow up, a family practitioner, to return to the emergency department if symptoms         

      worsen or persist or if there are any questions or concerns that arise at home.             

18:31 Management of patient was discussed with the following: Primary Care Provider:          matteo  

      Discussed results with Dr Mc. Pt stable for discharge. Educated to follow up             

      outpatient.                                                                                 

                                                                                                  

                                                                                             

15:07 Order name: Blood Culture Adult (2)                                                     kb  

                                                                                             

15:07 Order name: CBC with Diff; Complete Time: 17:28                                         kb  

                                                                                             

15:07 Order name: CMP; Complete Time: 17:41                                                   kb  

                                                                                             

15:07 Order name: Lactate w/ 2H reflex if indic.; Complete Time: 17:52                        kb  

                                                                                             

15:07 Order name: Protime (+inr); Complete Time: 17:28                                        kb  

                                                                                             

15:07 Order name: Ptt, Activated; Complete Time: 17:28                                        kb  

                                                                                             

15:07 Order name: Tib Fib Left XRAY; Complete Time: 18:15                                     kb  

                                                                                             

15:07 Order name: Femur Left XRAY; Complete Time: 18:15                                       kb  

                                                                                             

15:07 Order name: Foot Left 3 View XRAY; Complete Time: 18:15                                 kb  

                                                                                             

15:08 Order name: US Extremity Venous Unilateral Ltd; Complete Time: 16:15                    kb  

                                                                                             

15:07 Order name: EKG; Complete Time: 15:08                                                   kb  

                                                                                             

15:07 Order name: Misc. Order: remove bandage from left foot; Complete Time: 18:21            kb  

                                                                                             

15:07 Order name: Accucheck; Complete Time: 18:07                                             kb  

                                                                                             

15:07 Order name: Cardiac monitoring; Complete Time: 16:40                                    kb  

                                                                                             

15:07 Order name: EKG - Nurse/Tech; Complete Time: 16:40                                      kb  

                                                                                             

15:07 Order name: IV Saline Lock - Large Bore; Complete Time: 17:11                           kb  

                                                                                             

15:07 Order name: Labs collected and sent; Complete Time: 17:11                               kb  

                                                                                             

15:07 Order name: O2 Per Protocol; Complete Time: 17:11                                       kb  

                                                                                             

15:07 Order name: O2 Sat Monitoring; Complete Time: 17:11                                     kb  

                                                                                             

15:07 Order name: Vital Signs; Complete Time: 17:11                                           kb  

                                                                                                  

EC:43 Rate is 115 beats/min. Rhythm is regular. QRS Axis is Normal. CT interval is normal at  kb  

      132 msec. QRS interval is normal at 86 msec. QT interval is normal at 445 msec.             

                                                                                                  

Administered Medications:                                                                         

17:05 Drug: HYDROmorphone IVP 0.5 mg Route: IVP; Site: left antecubital;                      db  

18:23 Follow up: Response: No adverse reaction                                                db  

18:27 Drug: NS 0.9% IV 1000 ml Route: IV; Rate: 1000 ml; Site: left antecubital;              db  

20:21 Follow up: Response: No adverse reaction; IV Status: Completed infusion; IV Intake:     ll3 

      1000ml                                                                                      

18:49 Drug: HYDROmorphone IVP 0.5 mg Route: IVP; Site: left forearm;                          db  

20:22 Follow up: Response: No adverse reaction                                                ll3 

                                                                                                  

                                                                                                  

Disposition:                                                                                      

15:31 Co-signature as Attending Physician, Siddharth Gonsalez DO I was immediately available on-site ms3 

      in the Emergency Department for consultation in the care of the patient.                    

                                                                                                  

Disposition Summary:                                                                              

23 18:31                                                                                    

Discharge Ordered                                                                                 

      Location: Home                                                                          kb  

      Condition: Stable                                                                       kb  

      Diagnosis                                                                                   

        - Pain in left leg                                                                    kb  

      Followup:                                                                               kb  

        - With: Emergency Department                                                               

        - When: As needed                                                                          

        - Reason: Worsening of condition                                                           

      Followup:                                                                               kb  

        - With: Private Physician                                                                  

        - When: 2 - 3 days                                                                         

        - Reason: Recheck today's complaints, Continuance of care, Re-evaluation by your           

      physician                                                                                   

      Discharge Instructions:                                                                     

        - Discharge Summary Sheet                                                             kb  

        - Musculoskeletal Pain                                                                kb  

      Forms:                                                                                      

        - Medication Reconciliation Form                                                      kb  

        - Thank You Letter                                                                    kb  

        - Antibiotic Education                                                                kb  

        - Prescription Opioid Use                                                             kb  

Signatures:                                                                                       

Dispatcher [employer]                           Lenka Patiño, KAVON-ILEANA CLARKP-Jama Fiore, RN                        RN   jl7                                                  

Siddharth Gonsalez DO DO   ms3                                                  

Muna Bhat RN                    RN   db                                                   

Preet Hoffman RN   ll3                                                  

                                                                                                  

**************************************************************************************************

## 2023-06-14 NOTE — EKG
Test Date:    2023-06-12               Test Time:    16:34:08

Technician:   ELVIRA                                    

                                                     

MEASUREMENT RESULTS:                                       

Intervals:                                           

Rate:         115                                    

OR:           132                                    

QRSD:         86                                     

QT:           322                                    

QTc:          445                                    

Axis:                                                

P:            45                                     

OR:           132                                    

QRS:          39                                     

T:            27                                     

                                                     

INTERPRETIVE STATEMENTS:                                       

                                                     

Sinus tachycardia

Otherwise normal ECG

Compared to ECG 05/25/2023 12:13:06

No significant changes



Electronically Signed On 06-14-23 08:18:14 CDT by Cheng Anglin

## 2024-10-25 NOTE — RAD REPORT
EXAM DESCRIPTION:  CT - Head Brain Wo Cont - 4/8/2022 2:41 pm

 

CLINICAL HISTORY:  HEADACHE, dizziness, history of  shunt

 

COMPARISON:  Head Brain Wo Cont dated 3/24/2022

 

TECHNIQUE:  Axial 5 mm thick images of the head were obtained without IV contrast.

 

All CT scans are performed using dose optimization technique as appropriate and may include automated
 exposure control or mA/KV adjustment according to patient size.

 

FINDINGS:  No intracranial hemorrhage, mass, edema or shift of mid-line structures. No cortical level
 infarction. No cortical edema or sulcal effacement. Ventricles are decompressed. Right frontal and p
osterior right parietal shunt tubes are in place. Positioning has not change from prior imaging. Unde
rlying brain parenchymal developmental abnormalities are present with no new brain parenchymal findin
gs seen.

 

Mastoid air cells and visualized portions of the paranasal sinuses are clear.

 

No acute bony findings.

 

 

IMPRESSION:  Noncontrast CT head imaging shows no acute finding.

 

Above detailed findings are stable from 03/24/2022 imaging. Patient viewed general [facility] education video as instructed in their preoperative information received from their surgeon.  Patient stated the general [facility] education video was viewed in its entirety and survey completed.  Copies of [facility] general education handouts (Incentive Spirometry, Meds to Beds Program, Patient Belongings, Pre-op skin preparation instructions, Blood Glucose testing, Visitor policy, Surgery FAQ, Code H) distributed to patient if not printed. Education related to the PAT pass and skin preparation for surgery (if applicable) completed in [facility] as a reinforcement to [facility] education video. Patient instructed to return PAT pass provided today as well as completed skin preparation sheet (if applicable) on the day of procedure.     EKG completed in [facility], patient to have cardiologist appointment on 10/29 prior to procedure. Called Dr. Oliveros's office requesting clearance to be faxed to [facility]/placed in [facility] prior to procedure. Known history of Afib, Patient denies any shortness of breath or chest pain.

## 2025-06-17 NOTE — EDPHYS
Jani Haley,    Dr. Figueroa and her NP are both out of the office this week and next week.  However, I did forward the message to Dr. Figueroa as she usually addresses her own messages whenever she is out of to the office.  If she doesn't respond by tomorrow I'll reach out to another provider at another location to see if they are willing to assist with your request.     Thanks,  Vianey   Physician Documentation                                                                           

 [facility]                                                                 

Name: Redd Dale Jr                                                                            

Age: 35 yrs                                                                                       

Sex: Male                                                                                         

: 1985                                                                                   

MRN: [id number]                                                                                   

Arrival Date: 2021                                                                          

Time: 11:27                                                                                       

Account#: [id number]                                                                            

Bed 26                                                                                            

Private MD:                                                                                       

ED Physician Juan Luis Lerner                                                                       

HPI:                                                                                              

                                                                                             

13:04 This 35 yrs old Black Male presents to ER via Wheelchair with complaints of Problem     kdr 

      With Urinary Catheter.                                                                      

13:04 This 35 yrs old Black Male presents to ER via Wheelchair with complaints of Problem     kdr 

      With Urinary Catheter.                                                                      

13:04 The patient presents with a Ortega catheter problem, is leaking urine. Onset: The        kdr 

      symptoms/episode began/occurred gradually, 1 week(s) ago. Modifying factors: The            

      symptoms are alleviated by nothing, the symptoms are aggravated by nothing. Associated      

      signs and symptoms: The patient has no apparent associated signs or symptoms. Severity      

      of symptoms: At their worst the symptoms were mild, in the emergency department the         

      symptoms are unchanged. The patient has experienced similar episodes in the past,           

      multiple times. The patient has not recently seen a physician. last Ortega replacement       

      was about a month ago and is now leaking.                                                   

                                                                                                  

Historical:                                                                                       

- Allergies:                                                                                      

11:54 Amoxicillin;                                                                            ca1 

11:54 Bactrim;                                                                                ca1 

11:54 Ciprofloxacin;                                                                          ca1 

11:54 CLAVULANIC ACID;                                                                        ca1 

11:54 Demerol;                                                                                ca1 

11:54 Doxycycline;                                                                            ca1 

11:54 Levofloxacin;                                                                           ca1 

11:54 Morphine;                                                                               ca1 

11:54 PENICILLINS;                                                                            ca1 

11:54 Toradol;                                                                                ca1 

11:54 TRIMETHOPRIM;                                                                           ca1 

11:54 Vancomycin;                                                                             ca1 

11:54 Zofran;                                                                                 ca1 

- PMHx:                                                                                           

11:54 Asthma; Cerebral Palsy; cluster headaches; decubitus ulcers on feet; GERD;              ca1 

      Hydrocephalus; Hypertension; spina bifida;                                                  

- PSHx:                                                                                           

11:54  shunt; Heel Surgery L; Cholecystectomy;                                              ca1 

                                                                                                  

- Immunization history:: Pneumococcal vaccine is up to date, Flu vaccine is up to date.           

- Social history:: Smoking status: Patient reports the use of cigarette tobacco                   

  products, smokes one-half pack cigarettes per day.                                              

                                                                                                  

                                                                                                  

ROS:                                                                                              

13:04 Constitutional: Negative for fever, chills, and weight loss.                            kdr 

13:04 Abdomen/GI: Positive for suprapubic catheter in place with urine smell and dampness         

      around the site.                                                                            

                                                                                                  

Exam:                                                                                             

13:04 Constitutional:  This is a well developed, well nourished patient who is awake, alert,  kdr 

      and in no acute distress.                                                                   

13:04 Abdomen/GI: Inspection: obese Bowel sounds: Palpation: soft, nontender, There is a          

      suprapubic Ortega in place as described above in the ROS.  No evidence of infection.         

                                                                                                  

Vital Signs:                                                                                      

11:48  / 81; Pulse 93; Resp 16 S; Temp 98.6(O); Pulse Ox 97% on R/A; Weight 172.37 kg   ca1 

      (R); Height 4 ft. 11 in. (149.86 cm) (R); Pain 6/10;                                        

14:11  / 80; Pulse 89; Resp 16 S; Pulse Ox 97% on R/A;                                  jd3 

11:48 Body Mass Index 76.75 (172.37 kg, 149.86 cm)                                            ca1 

                                                                                                  

MDM:                                                                                              

14:01 Patient medically screened.                                                             kdr 

14:25 Data reviewed: vital signs, nurses notes. Counseling: I had a detailed discussion with  kdr 

      the patient and/or guardian regarding: the historical points, exam findings, and any        

      diagnostic results supporting the discharge/admit diagnosis, the need for outpatient        

      follow up. ED course: We were unable to provide resolution of the problem - did not         

      have proper Ortega availabe.                                                                 

                                                                                                  

Administered Medications:                                                                         

No medications were administered                                                                  

                                                                                                  

                                                                                                  

Disposition:                                                                                      

21 14:01 Discharged to Home. Impression: Leaking Ortega.                                     

- Condition is Stable.                                                                            

- Discharge Instructions: Ortega Catheter Care, Adult, Easy-to-Read.                               

                                                                                                  

- Blank Diagnosis Outline, Medication Reconciliation Form, Thank You Letter form.                 

- Follow up: Private Physician; When: 2 - 3 days; Reason: If symptoms return, Further             

  diagnostic work-up, Recheck today's complaints, Continuance of care, Re-evaluation by           

  your physician.                                                                                 

- Problem is new.                                                                                 

- Symptoms are unchanged.                                                                         

                                                                                                  

                                                                                                  

                                                                                                  

Signatures:                                                                                       

Juan Luis Lerner MD MD   kdr                                                  

Cristopher Bob RN                    RN   jd3                                                  

Dai Holt RN                        RN   ca1                                                  

                                                                                                  

Corrections: (The following items were deleted from the chart)                                    

14:12 14:01 2021 14:01 Discharged to Home. Impression: Leaking Ortega. Condition is      jd3 

      Stable. Forms are Medication Reconciliation Form, Thank You Letter, Antibiotic              

      Education, Prescription Opioid Use. Follow up: Private Physician; When: 2 - 3 days;         

      Reason: If symptoms return, Further diagnostic work-up, Recheck today's complaints,         

      Continuance of care, Re-evaluation by your physician. Problem is new. Symptoms are          

      unchanged. kdr                                                                              

                                                                                                  

**************************************************************************************************